# Patient Record
Sex: MALE | Race: ASIAN | NOT HISPANIC OR LATINO | URBAN - METROPOLITAN AREA
[De-identification: names, ages, dates, MRNs, and addresses within clinical notes are randomized per-mention and may not be internally consistent; named-entity substitution may affect disease eponyms.]

---

## 2017-10-23 RX ORDER — VALSARTAN 320 MG/1
320 TABLET ORAL DAILY
Qty: 30 TABLET | Refills: 0 | Status: SHIPPED | OUTPATIENT
Start: 2017-10-23 | End: 2018-10-18

## 2017-11-20 RX ORDER — VALSARTAN 320 MG/1
320 TABLET ORAL DAILY
Qty: 30 TABLET | Refills: 0 | OUTPATIENT
Start: 2017-11-20 | End: 2018-11-15

## 2019-03-22 RX ORDER — OXYCODONE HYDROCHLORIDE AND ACETAMINOPHEN 5; 325 MG/1; MG/1
1 TABLET ORAL EVERY 8 HOURS PRN
Qty: 30 TABLET | Refills: 0 | Status: SHIPPED | OUTPATIENT
Start: 2019-03-22 | End: 2019-03-27

## 2019-03-29 RX ORDER — VALSARTAN 320 MG/1
320 TABLET ORAL DAILY
Qty: 30 TABLET | Refills: 0 | OUTPATIENT
Start: 2019-03-29 | End: 2020-03-23

## 2019-04-11 RX ORDER — ROSUVASTATIN CALCIUM 10 MG/1
10 TABLET, COATED ORAL DAILY
Qty: 90 TABLET | Refills: 2 | Status: SHIPPED | OUTPATIENT
Start: 2019-04-11 | End: 2019-04-12 | Stop reason: ALTCHOICE

## 2019-04-12 DIAGNOSIS — R26.0 ATAXIC GAIT: ICD-10-CM

## 2019-04-12 DIAGNOSIS — R26.2 DIFFICULTY WALKING: ICD-10-CM

## 2019-04-12 DIAGNOSIS — Z86.73 HISTORY OF STROKE: Primary | ICD-10-CM

## 2019-04-12 DIAGNOSIS — R29.6 RECURRENT FALLS: ICD-10-CM

## 2019-04-12 RX ORDER — ATORVASTATIN CALCIUM 20 MG/1
20 TABLET, FILM COATED ORAL DAILY
Qty: 90 TABLET | Refills: 3 | Status: SHIPPED | OUTPATIENT
Start: 2019-04-12 | End: 2020-04-06

## 2019-05-16 ENCOUNTER — OUTPATIENT (OUTPATIENT)
Dept: OUTPATIENT SERVICES | Facility: HOSPITAL | Age: 84
LOS: 1 days | End: 2019-05-16
Payer: MEDICARE

## 2019-05-16 ENCOUNTER — APPOINTMENT (OUTPATIENT)
Dept: ULTRASOUND IMAGING | Facility: IMAGING CENTER | Age: 84
End: 2019-05-16
Payer: MEDICARE

## 2019-05-16 DIAGNOSIS — Z00.8 ENCOUNTER FOR OTHER GENERAL EXAMINATION: ICD-10-CM

## 2019-05-16 DIAGNOSIS — Z98.89 OTHER SPECIFIED POSTPROCEDURAL STATES: Chronic | ICD-10-CM

## 2019-05-16 DIAGNOSIS — Z95.5 PRESENCE OF CORONARY ANGIOPLASTY IMPLANT AND GRAFT: Chronic | ICD-10-CM

## 2019-05-16 PROCEDURE — 76700 US EXAM ABDOM COMPLETE: CPT | Mod: 26

## 2019-05-16 PROCEDURE — 76700 US EXAM ABDOM COMPLETE: CPT

## 2019-05-24 ENCOUNTER — INPATIENT (INPATIENT)
Facility: HOSPITAL | Age: 84
LOS: 4 days | Discharge: HOME HEALTH | End: 2019-05-29
Attending: INTERNAL MEDICINE | Admitting: INTERNAL MEDICINE
Payer: MEDICARE

## 2019-05-24 VITALS
TEMPERATURE: 98 F | SYSTOLIC BLOOD PRESSURE: 123 MMHG | OXYGEN SATURATION: 98 % | RESPIRATION RATE: 16 BRPM | DIASTOLIC BLOOD PRESSURE: 62 MMHG | HEART RATE: 84 BPM

## 2019-05-24 DIAGNOSIS — I21.9 ACUTE MYOCARDIAL INFARCTION, UNSPECIFIED: ICD-10-CM

## 2019-05-24 DIAGNOSIS — E11.9 TYPE 2 DIABETES MELLITUS WITHOUT COMPLICATIONS: ICD-10-CM

## 2019-05-24 DIAGNOSIS — E78.5 HYPERLIPIDEMIA, UNSPECIFIED: ICD-10-CM

## 2019-05-24 DIAGNOSIS — I10 ESSENTIAL (PRIMARY) HYPERTENSION: ICD-10-CM

## 2019-05-24 DIAGNOSIS — Z95.5 PRESENCE OF CORONARY ANGIOPLASTY IMPLANT AND GRAFT: Chronic | ICD-10-CM

## 2019-05-24 DIAGNOSIS — I63.9 CEREBRAL INFARCTION, UNSPECIFIED: ICD-10-CM

## 2019-05-24 DIAGNOSIS — Z98.89 OTHER SPECIFIED POSTPROCEDURAL STATES: Chronic | ICD-10-CM

## 2019-05-24 DIAGNOSIS — Z29.9 ENCOUNTER FOR PROPHYLACTIC MEASURES, UNSPECIFIED: ICD-10-CM

## 2019-05-24 DIAGNOSIS — G62.9 POLYNEUROPATHY, UNSPECIFIED: ICD-10-CM

## 2019-05-24 LAB
ALBUMIN SERPL ELPH-MCNC: 4.1 G/DL — SIGNIFICANT CHANGE UP (ref 3.3–5)
ALP SERPL-CCNC: 70 U/L — SIGNIFICANT CHANGE UP (ref 40–120)
ALT FLD-CCNC: 46 U/L — HIGH (ref 4–41)
ANION GAP SERPL CALC-SCNC: 14 MMO/L — SIGNIFICANT CHANGE UP (ref 7–14)
APTT BLD: 29.2 SEC — SIGNIFICANT CHANGE UP (ref 27.5–36.3)
AST SERPL-CCNC: 54 U/L — HIGH (ref 4–40)
BASE EXCESS BLDA CALC-SCNC: -4.6 MMOL/L — SIGNIFICANT CHANGE UP
BASE EXCESS BLDV CALC-SCNC: -3.8 MMOL/L — SIGNIFICANT CHANGE UP
BILIRUB SERPL-MCNC: 0.5 MG/DL — SIGNIFICANT CHANGE UP (ref 0.2–1.2)
BUN SERPL-MCNC: 32 MG/DL — HIGH (ref 7–23)
CA-I BLDA-SCNC: 1.04 MMOL/L — LOW (ref 1.15–1.29)
CALCIUM SERPL-MCNC: 9.2 MG/DL — SIGNIFICANT CHANGE UP (ref 8.4–10.5)
CHLORIDE SERPL-SCNC: 93 MMOL/L — LOW (ref 98–107)
CK MB BLD-MCNC: 2.9 — HIGH (ref 0–2.5)
CK MB BLD-MCNC: 4.44 NG/ML — SIGNIFICANT CHANGE UP (ref 1–6.6)
CK SERPL-CCNC: 155 U/L — SIGNIFICANT CHANGE UP (ref 30–200)
CO2 SERPL-SCNC: 20 MMOL/L — LOW (ref 22–31)
CREAT SERPL-MCNC: 1.82 MG/DL — HIGH (ref 0.5–1.3)
GLUCOSE BLDA-MCNC: 420 MG/DL — HIGH (ref 70–99)
GLUCOSE BLDC GLUCOMTR-MCNC: 286 MG/DL — HIGH (ref 70–99)
GLUCOSE BLDC GLUCOMTR-MCNC: 352 MG/DL — HIGH (ref 70–99)
GLUCOSE BLDC GLUCOMTR-MCNC: 381 MG/DL — HIGH (ref 70–99)
GLUCOSE BLDC GLUCOMTR-MCNC: 409 MG/DL — HIGH (ref 70–99)
GLUCOSE SERPL-MCNC: 478 MG/DL — CRITICAL HIGH (ref 70–99)
HCO3 BLDA-SCNC: 21 MMOL/L — LOW (ref 22–26)
HCO3 BLDV-SCNC: 20 MMOL/L — SIGNIFICANT CHANGE UP (ref 20–27)
HCT VFR BLD CALC: 35.2 % — LOW (ref 39–50)
HCT VFR BLDA CALC: 34.8 % — LOW (ref 39–51)
HGB BLD-MCNC: 11.6 G/DL — LOW (ref 13–17)
HGB BLDA-MCNC: 11.3 G/DL — LOW (ref 13–17)
INR BLD: 1.14 — SIGNIFICANT CHANGE UP (ref 0.88–1.17)
LACTATE BLDA-SCNC: 2.1 MMOL/L — HIGH (ref 0.5–2)
MCHC RBC-ENTMCNC: 29.5 PG — SIGNIFICANT CHANGE UP (ref 27–34)
MCHC RBC-ENTMCNC: 33 % — SIGNIFICANT CHANGE UP (ref 32–36)
MCV RBC AUTO: 89.6 FL — SIGNIFICANT CHANGE UP (ref 80–100)
NRBC # FLD: 0 K/UL — SIGNIFICANT CHANGE UP (ref 0–0)
NT-PROBNP SERPL-SCNC: 9001 PG/ML — SIGNIFICANT CHANGE UP
PCO2 BLDA: 40 MMHG — SIGNIFICANT CHANGE UP (ref 35–48)
PCO2 BLDV: 43 MMHG — SIGNIFICANT CHANGE UP (ref 41–51)
PH BLDA: 7.33 PH — LOW (ref 7.35–7.45)
PH BLDV: 7.32 PH — SIGNIFICANT CHANGE UP (ref 7.32–7.43)
PLATELET # BLD AUTO: 249 K/UL — SIGNIFICANT CHANGE UP (ref 150–400)
PMV BLD: 10.2 FL — SIGNIFICANT CHANGE UP (ref 7–13)
PO2 BLDA: 133 MMHG — HIGH (ref 83–108)
PO2 BLDV: 29 MMHG — LOW (ref 35–40)
POTASSIUM BLDA-SCNC: 5.8 MMOL/L — HIGH (ref 3.4–4.5)
POTASSIUM SERPL-MCNC: 6.5 MMOL/L — CRITICAL HIGH (ref 3.5–5.3)
POTASSIUM SERPL-SCNC: 6.5 MMOL/L — CRITICAL HIGH (ref 3.5–5.3)
PROT SERPL-MCNC: 7.9 G/DL — SIGNIFICANT CHANGE UP (ref 6–8.3)
PROTHROM AB SERPL-ACNC: 13.1 SEC — SIGNIFICANT CHANGE UP (ref 9.8–13.1)
RBC # BLD: 3.93 M/UL — LOW (ref 4.2–5.8)
RBC # FLD: 13.5 % — SIGNIFICANT CHANGE UP (ref 10.3–14.5)
SAO2 % BLDA: 98.3 % — SIGNIFICANT CHANGE UP (ref 95–99)
SAO2 % BLDV: 41 % — LOW (ref 60–85)
SODIUM BLDA-SCNC: 128 MMOL/L — LOW (ref 136–146)
SODIUM SERPL-SCNC: 127 MMOL/L — LOW (ref 135–145)
TROPONIN T, HIGH SENSITIVITY: 601 NG/L — CRITICAL HIGH (ref ?–14)
WBC # BLD: 10.37 K/UL — SIGNIFICANT CHANGE UP (ref 3.8–10.5)
WBC # FLD AUTO: 10.37 K/UL — SIGNIFICANT CHANGE UP (ref 3.8–10.5)

## 2019-05-24 PROCEDURE — 93459 L HRT ART/GRFT ANGIO: CPT | Mod: 26,59

## 2019-05-24 PROCEDURE — 93308 TTE F-UP OR LMTD: CPT | Mod: 26,GC

## 2019-05-24 PROCEDURE — 93321 DOPPLER ECHO F-UP/LMTD STD: CPT | Mod: 26

## 2019-05-24 PROCEDURE — 93325 DOPPLER ECHO COLOR FLOW MAPG: CPT | Mod: 26,GC

## 2019-05-24 PROCEDURE — 33967 INSERT I-AORT PERCUT DEVICE: CPT

## 2019-05-24 PROCEDURE — 92937 PRQ TRLUML REVSC CAB GRF 1: CPT | Mod: LC

## 2019-05-24 PROCEDURE — 71045 X-RAY EXAM CHEST 1 VIEW: CPT | Mod: 26,76

## 2019-05-24 RX ORDER — CARVEDILOL PHOSPHATE 80 MG/1
6.25 CAPSULE, EXTENDED RELEASE ORAL EVERY 12 HOURS
Refills: 0 | Status: DISCONTINUED | OUTPATIENT
Start: 2019-05-24 | End: 2019-05-29

## 2019-05-24 RX ORDER — DEXTROSE 50 % IN WATER 50 %
15 SYRINGE (ML) INTRAVENOUS ONCE
Refills: 0 | Status: DISCONTINUED | OUTPATIENT
Start: 2019-05-24 | End: 2019-05-25

## 2019-05-24 RX ORDER — BENZOCAINE AND MENTHOL 5; 1 G/100ML; G/100ML
1 LIQUID ORAL ONCE
Refills: 0 | Status: COMPLETED | OUTPATIENT
Start: 2019-05-24 | End: 2019-05-24

## 2019-05-24 RX ORDER — DEXTROSE 50 % IN WATER 50 %
25 SYRINGE (ML) INTRAVENOUS ONCE
Refills: 0 | Status: DISCONTINUED | OUTPATIENT
Start: 2019-05-24 | End: 2019-05-25

## 2019-05-24 RX ORDER — GLUCAGON INJECTION, SOLUTION 0.5 MG/.1ML
1 INJECTION, SOLUTION SUBCUTANEOUS ONCE
Refills: 0 | Status: DISCONTINUED | OUTPATIENT
Start: 2019-05-24 | End: 2019-05-29

## 2019-05-24 RX ORDER — ASPIRIN/CALCIUM CARB/MAGNESIUM 324 MG
324 TABLET ORAL ONCE
Refills: 0 | Status: COMPLETED | OUTPATIENT
Start: 2019-05-24 | End: 2019-05-24

## 2019-05-24 RX ORDER — ASPIRIN/CALCIUM CARB/MAGNESIUM 324 MG
81 TABLET ORAL DAILY
Refills: 0 | Status: DISCONTINUED | OUTPATIENT
Start: 2019-05-25 | End: 2019-05-29

## 2019-05-24 RX ORDER — INSULIN LISPRO 100/ML
VIAL (ML) SUBCUTANEOUS AT BEDTIME
Refills: 0 | Status: DISCONTINUED | OUTPATIENT
Start: 2019-05-24 | End: 2019-05-29

## 2019-05-24 RX ORDER — HEPARIN SODIUM 5000 [USP'U]/ML
1000 INJECTION INTRAVENOUS; SUBCUTANEOUS
Qty: 25000 | Refills: 0 | Status: DISCONTINUED | OUTPATIENT
Start: 2019-05-25 | End: 2019-05-26

## 2019-05-24 RX ORDER — HEPARIN SODIUM 5000 [USP'U]/ML
5000 INJECTION INTRAVENOUS; SUBCUTANEOUS ONCE
Refills: 0 | Status: COMPLETED | OUTPATIENT
Start: 2019-05-24 | End: 2019-05-24

## 2019-05-24 RX ORDER — CLOPIDOGREL BISULFATE 75 MG/1
600 TABLET, FILM COATED ORAL ONCE
Refills: 0 | Status: COMPLETED | OUTPATIENT
Start: 2019-05-24 | End: 2019-05-24

## 2019-05-24 RX ORDER — INSULIN LISPRO 100/ML
VIAL (ML) SUBCUTANEOUS
Refills: 0 | Status: DISCONTINUED | OUTPATIENT
Start: 2019-05-24 | End: 2019-05-29

## 2019-05-24 RX ORDER — HEPARIN SODIUM 5000 [USP'U]/ML
1000 INJECTION INTRAVENOUS; SUBCUTANEOUS
Qty: 25000 | Refills: 0 | Status: DISCONTINUED | OUTPATIENT
Start: 2019-05-24 | End: 2019-05-24

## 2019-05-24 RX ORDER — INSULIN GLARGINE 100 [IU]/ML
20 INJECTION, SOLUTION SUBCUTANEOUS AT BEDTIME
Refills: 0 | Status: DISCONTINUED | OUTPATIENT
Start: 2019-05-24 | End: 2019-05-29

## 2019-05-24 RX ORDER — DEXTROSE 50 % IN WATER 50 %
12.5 SYRINGE (ML) INTRAVENOUS ONCE
Refills: 0 | Status: DISCONTINUED | OUTPATIENT
Start: 2019-05-24 | End: 2019-05-25

## 2019-05-24 RX ORDER — CHLORHEXIDINE GLUCONATE 213 G/1000ML
1 SOLUTION TOPICAL
Refills: 0 | Status: DISCONTINUED | OUTPATIENT
Start: 2019-05-24 | End: 2019-05-29

## 2019-05-24 RX ORDER — ATORVASTATIN CALCIUM 80 MG/1
80 TABLET, FILM COATED ORAL AT BEDTIME
Refills: 0 | Status: DISCONTINUED | OUTPATIENT
Start: 2019-05-24 | End: 2019-05-29

## 2019-05-24 RX ORDER — CLOPIDOGREL BISULFATE 75 MG/1
75 TABLET, FILM COATED ORAL DAILY
Refills: 0 | Status: DISCONTINUED | OUTPATIENT
Start: 2019-05-25 | End: 2019-05-29

## 2019-05-24 RX ORDER — GABAPENTIN 400 MG/1
400 CAPSULE ORAL DAILY
Refills: 0 | Status: DISCONTINUED | OUTPATIENT
Start: 2019-05-24 | End: 2019-05-29

## 2019-05-24 RX ORDER — ASPIRIN/CALCIUM CARB/MAGNESIUM 324 MG
325 TABLET ORAL ONCE
Refills: 0 | Status: DISCONTINUED | OUTPATIENT
Start: 2019-05-24 | End: 2019-05-24

## 2019-05-24 RX ORDER — SODIUM CHLORIDE 9 MG/ML
1000 INJECTION, SOLUTION INTRAVENOUS
Refills: 0 | Status: DISCONTINUED | OUTPATIENT
Start: 2019-05-24 | End: 2019-05-25

## 2019-05-24 RX ADMIN — Medication 324 MILLIGRAM(S): at 13:00

## 2019-05-24 RX ADMIN — CARVEDILOL PHOSPHATE 6.25 MILLIGRAM(S): 80 CAPSULE, EXTENDED RELEASE ORAL at 21:51

## 2019-05-24 RX ADMIN — Medication 200 MILLIGRAM(S): at 23:26

## 2019-05-24 RX ADMIN — Medication 2: at 18:27

## 2019-05-24 RX ADMIN — ATORVASTATIN CALCIUM 80 MILLIGRAM(S): 80 TABLET, FILM COATED ORAL at 21:51

## 2019-05-24 RX ADMIN — INSULIN GLARGINE 20 UNIT(S): 100 INJECTION, SOLUTION SUBCUTANEOUS at 21:51

## 2019-05-24 RX ADMIN — CLOPIDOGREL BISULFATE 600 MILLIGRAM(S): 75 TABLET, FILM COATED ORAL at 13:05

## 2019-05-24 RX ADMIN — BENZOCAINE AND MENTHOL 1 LOZENGE: 5; 1 LIQUID ORAL at 20:30

## 2019-05-24 RX ADMIN — Medication 1: at 21:56

## 2019-05-24 RX ADMIN — HEPARIN SODIUM 5000 UNIT(S): 5000 INJECTION INTRAVENOUS; SUBCUTANEOUS at 13:05

## 2019-05-24 RX ADMIN — Medication 100 MILLIGRAM(S): at 21:51

## 2019-05-24 NOTE — H&P ADULT - NSHPSOCIALHISTORY_GEN_ALL_CORE
T: denies, lifelong  EtOH: denies, lifelong  Recreational drugs: denies  Herbal supplements: says he takes vitamins and supplements from [place] (vit c, b complex, etc)

## 2019-05-24 NOTE — CHART NOTE - NSCHARTNOTEFT_GEN_A_CORE
Left femoral sheath removed; 22 minutes of manual pressure applied; patient tolerated well; no hematoma noted; distal pulses 2+  no medications administered  clean dry dressing applied

## 2019-05-24 NOTE — ED PROVIDER NOTE - CLINICAL SUMMARY MEDICAL DECISION MAKING FREE TEXT BOX
84 y/o male with PMHx of CAD s/p stent, DM, HLD, HTN, and CVA presenting to the ED for syncope today. Abnormal EKG with concern for AMI. Cardiology consulted. Plavix and Heparin ordered.

## 2019-05-24 NOTE — H&P ADULT - HISTORY OF PRESENT ILLNESS
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 Pt reports he passed out yesterday and then again today after getting his ears cleaned out. Per son at bedside, patient never lost consciousness, but rather his "legs gave out beneath him and he crumbled." Pt admits to intermittent chest pressure (6/10, gradual onset, intermittent) and SOB, worsening over the past 3 days. Says he used to be very active and walk a lot, but recently he has only been able to walk around the house and becomes SOB after walking 2-3 blocks. Pt also admits to nausea with an episode of vomiting yesterday. Pt denies any headache, neck pain, back pain, fever, chills, or abd pain.     In the ED, afebrile, HR 80s, BP 120s/60, satting well on room air.  Elevated cardiac enzymes and abnormal EKG concerning for MI. Pt placed on Heparin and taken urgently to cath lab. LIMA to LAD was patent, but saphenous vein graft to OM1 found to have 90% stenosis, s/p 1 GEMMA.

## 2019-05-24 NOTE — ED PROVIDER NOTE - PROGRESS NOTE DETAILS
Dr. Morris: Cardiology was paged and sent EKG. Cardiology repaged for bradycardia, repeat ekg ordered. Will admit to CCU given pt remains symptomatic, concern for possible ACS pt given heparin bolus.

## 2019-05-24 NOTE — H&P ADULT - PROBLEM SELECTOR PLAN 1
Presented with progressive intermittent chest pressure and KING, found to have elevated trops and signs of inferior wall ischemia on EKG in ED, sent to cath lab. Found to have 90% stenosis of saphenous vein graft to OM1, s/p 1 stent. Pt returned to CCU with IABP and TVP.   - trend trops till peak  - c/w ASA 81  - c/w Plavix  - c/w statin  - c/w home antihypertensives  - trend FSs and adjust insulin as necessary  - diet and lifestyle modification education  - f/u echo  - wean IABP as tolerated  - monitor on tele, wean from TVP as tolerated   - monitor b/l groin for hematomas

## 2019-05-24 NOTE — ED ADULT NURSE NOTE - NSIMPLEMENTINTERV_GEN_ALL_ED
Implemented All Fall with Harm Risk Interventions:  [place] to call system. Call bell, personal items and telephone within reach. Instruct patient to call for assistance. Room bathroom lighting operational. Non-slip footwear when patient is off stretcher. Physically safe environment: no spills, clutter or unnecessary equipment. Stretcher in lowest position, wheels locked, appropriate side rails in place. Provide visual cue, wrist band, yellow gown, etc. Monitor gait and stability. Monitor for mental status changes and reorient to person, place, and time. Review medications for side effects contributing to fall risk. Reinforce activity limits and safety measures with patient and family. Provide visual clues: red socks.

## 2019-05-24 NOTE — H&P ADULT - NSICDXPASTMEDICALHX_GEN_ALL_CORE_FT
PAST MEDICAL HISTORY:  Coronary Artery Disease     Diabetes Mellitus Type II     Hyperlipemia     Hypertension     Myocardial infarction     Neuropathy     Stented Coronary Artery     Stroke mild left facial numbness   no other residuals verbalized

## 2019-05-24 NOTE — H&P ADULT - NSHPREVIEWOFSYSTEMS_GEN_ALL_CORE
REVIEW OF SYSTEMS (post-cath):    CONSTITUTIONAL: No weakness, fevers or chills  EYES/ENT: No visual changes;  No vertigo or throat pain   NECK: No pain or stiffness  RESPIRATORY: No wheezing, hemoptysis; No shortness of breath. Endorses cough, says this is residual from URI he's had for past week  CARDIOVASCULAR: No chest pain or palpitations  GASTROINTESTINAL: No abdominal or epigastric pain. No nausea, vomiting, or hematemesis; No diarrhea or constipation. No melena or hematochezia.  GENITOURINARY: No dysuria, frequency or hematuria  NEUROLOGICAL: No numbness or weakness  SKIN: No itching, rashes

## 2019-05-24 NOTE — ED ADULT NURSE NOTE - CHIEF COMPLAINT QUOTE
family states " he is passing out since yesterday  with nausea and elevated blood sugar ". h/o MI and had wax removed from ears to day. FS on the field 464 h/o Afib on ASA only left hand 20 by EMS

## 2019-05-24 NOTE — H&P ADULT - PROBLEM SELECTOR PLAN 2
Essential HTN  - c/w home metoprolol  - hold home losartan for now. trend sCr daily, monitor for signs of BERENICE prior to restarting ARB  - c/w home carvedilol  - monitor vitals

## 2019-05-24 NOTE — ED PROVIDER NOTE - PSH
H/O coronary angiogram    History of Cataract Extraction    Hx of CABG    S/P coronary artery stent placement  1/6/09

## 2019-05-24 NOTE — H&P ADULT - NSHPPHYSICALEXAM_GEN_ALL_CORE
PHYSICAL EXAM:  Vital Signs Last 24 Hrs  T(C): 36.3 (24 May 2019 16:18), Max: 36.7 (24 May 2019 12:20)  T(F): 97.3 (24 May 2019 16:18), Max: 98 (24 May 2019 12:20)  HR: 60 (24 May 2019 18:00) (60 - 87)  BP: 157/65 (24 May 2019 18:00) (123/62 - 157/65)  BP(mean): 89 (24 May 2019 18:00) (89 - 113)  RR: 14 (24 May 2019 18:00) (14 - 18)  SpO2: 100% (24 May 2019 18:00) (98% - 100%)  GENERAL: NAD. Appears comfortable  HEENT: Normocephalic, atraumatic. EOMI. PERRL. Normal oral mucosa.   RESP: Clear to auscultation bilaterally anteriorly and laterally. +cough  CVS: Regular rate and rhythm, no murmurs  ABD: Soft, nondistended, nontender  EXT: VELARDE x 4   SKIN: Warm, dry, no rashes

## 2019-05-24 NOTE — H&P ADULT - ASSESSMENT
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 Found to have elevated cardiac enzymes and EKG w/ ST elevations, urgently cathed. Found to have 90% stenosis of saphenous vein graft to OM1, s/p 1 stent.

## 2019-05-24 NOTE — H&P ADULT - PROBLEM SELECTOR PLAN 4
- on home Lantus 20   - hold home oral antiglycemic agents  - ISS  - adjust insulin as necessary  - trend FSs  - f/u HgbA1C

## 2019-05-24 NOTE — ED PROVIDER NOTE - OBJECTIVE STATEMENT
84 y/o male with PMHx of CAD s/p stent, DM, HLD, HTN, and CVA presenting to the ED for syncope today. Pt reports he passed out yesterday and then again today after getting his ears cleaned out. Pt admits to intermittent chest pressure and SOB over the past 3 days. Pt also admits to nausea with an episode of vomiting yesterday. Pt denies any headache, neck pain, back pain, fever, chills, or abd pain.

## 2019-05-24 NOTE — H&P ADULT - NSHPLABSRESULTS_GEN_ALL_CORE
11.6   10.37 )-----------( 249      ( 24 May 2019 12:59 )             35.2     05-24    127<L>  |  93<L>  |  32<H>  ----------------------------<  478<HH>  6.5<HH>   |  20<L>  |  1.82<H>    Ca    9.2      24 May 2019 12:55    TPro  7.9  /  Alb  4.1  /  TBili  0.5  /  DBili  x   /  AST  54<H>  /  ALT  46<H>  /  AlkPhos  70  05-24    PT/INR - ( 24 May 2019 12:55 )   PT: 13.1 SEC;   INR: 1.14          PTT - ( 24 May 2019 12:55 )  PTT:29.2 SEC      Cath Report:    PROCEDURE:  --  Intra-aortic balloon counterpulsation.  --  Temporary pacemaker placement.  --  Left heart catheterization.  --  Saphenous vein graft angiography.  --  LIMA graft angiography.  --  Intervention on SVG (proximal 1/3) from the aorta to OM1: balloon  angioplasty, stent.      VENTRICLES: No LV gram was performed; however, a recent (in cath lab)  echocardiogram demonstrated an EF of 40 %.  CORONARY VESSELS: The coronary circulation is right dominant.  LM:   --  LM: This vessel was not injected.  RCA:   --  RCA: This vessel was not injected.  GRAFTS:   --  Graft to the mid LAD: The graft was a LIMA. It was normal.  --  Graft to the 1st obtuse marginal: The graft was a saphenous vein graft  from the aorta. There was a discrete 90 % stenosis in the proximal third  of the graft, at the site of a prior stent, within the stented segment.  There was ROQUE grade 3 flow through the graft (brisk flow).  COMPLICATIONS: There were no complications.    SUMMARY:  1ST LESION INTERVENTIONS: A successful balloon angioplasty with stent was  performed on the 90 % lesion in the proximal third of the saphenous vein  graft from the aorta to the 1st obtuse marginal. Following intervention  there was a 1 % residual stenosis.  DIAGNOSTIC RECOMMENDATIONS: PCI of proximal SVG to OM for treatment of  unstable angina. Patient presented to cath lab in junctional rhythm (40's  bpm escape rhythm) and with low blood pressure (100's mmHg systolic). IABP  and temporary wire placed prior to PCI.  INTERVENTIONAL RECOMMENDATIONS: Add clopidogrel (Plavix), 75 mg, PO, daily.  Add aspirin, 325 mg, PO, daily. Patient management should include close  monitoring of BUN and creatinine. Medical management is recommended.  Continue IABP for next 24 hours. At the end of the procedure patient was  in sinus rhythm with rates in the 70-80's bpm. Plan to remove temporary  pacing wire if patient continues to maintain sinus rhythm.

## 2019-05-24 NOTE — ED PROVIDER NOTE - ATTENDING CONTRIBUTION TO CARE
Pt was seen and evaluated by me. Pt is a 86 y/o male with PMHx of CAD s/p stent, DM, HLD, HTN, and CVA presenting to the ED for syncope today. Pt reports he passed out yesterday and then again today after getting his ears cleaned out. Pt admits to intermittent chest pressure and SOB over the past 3 days. Pt also admits to nausea with an episode of vomiting yesterday. Pt denies any headache, neck pain, back pain, fever, chills, or abd pain. Lungs CTA b/l. RRR. Abd soft, non-tender. Concern for AMI with abnormal EKG.

## 2019-05-24 NOTE — ED ADULT TRIAGE NOTE - CHIEF COMPLAINT QUOTE
family states " he is passing out since yesterday  with nausea and elevated blood sugar ". h/o MI and had wax removed from ears to day. FS on the field 464 h/o Afib on ASA only left hand 20 by EMS family states " he is passing out since yesterday  with nausea and elevated blood sugar ". h/o MI and had wax removed from ears to day. FS on the field 464 h/o Afib on ASA only left hand 20 by EMS

## 2019-05-24 NOTE — ED PROVIDER NOTE - PMH
Coronary Artery Disease    Diabetes Mellitus Type II    Hyperlipemia    Hypertension    Myocardial infarction    Neuropathy    Stented Coronary Artery    Stroke  mild left facial numbness   no other residuals verbalized

## 2019-05-24 NOTE — H&P ADULT - NSICDXPASTSURGICALHX_GEN_ALL_CORE_FT
PAST SURGICAL HISTORY:  H/O coronary angiogram     History of Cataract Extraction     Hx of CABG     S/P coronary artery stent placement 1/6/09

## 2019-05-25 DIAGNOSIS — I21.3 ST ELEVATION (STEMI) MYOCARDIAL INFARCTION OF UNSPECIFIED SITE: ICD-10-CM

## 2019-05-25 LAB
ALBUMIN SERPL ELPH-MCNC: 3.3 G/DL — SIGNIFICANT CHANGE UP (ref 3.3–5)
ALP SERPL-CCNC: 62 U/L — SIGNIFICANT CHANGE UP (ref 40–120)
ALT FLD-CCNC: 35 U/L — SIGNIFICANT CHANGE UP (ref 4–41)
ANION GAP SERPL CALC-SCNC: 12 MMO/L — SIGNIFICANT CHANGE UP (ref 7–14)
APTT BLD: 73 SEC — HIGH (ref 27.5–36.3)
APTT BLD: 90.1 SEC — HIGH (ref 27.5–36.3)
AST SERPL-CCNC: 33 U/L — SIGNIFICANT CHANGE UP (ref 4–40)
BILIRUB SERPL-MCNC: 0.4 MG/DL — SIGNIFICANT CHANGE UP (ref 0.2–1.2)
BUN SERPL-MCNC: 38 MG/DL — HIGH (ref 7–23)
CALCIUM SERPL-MCNC: 8.6 MG/DL — SIGNIFICANT CHANGE UP (ref 8.4–10.5)
CHLORIDE SERPL-SCNC: 98 MMOL/L — SIGNIFICANT CHANGE UP (ref 98–107)
CHOLEST SERPL-MCNC: 89 MG/DL — LOW (ref 120–199)
CK MB BLD-MCNC: 2.7 — HIGH (ref 0–2.5)
CK MB BLD-MCNC: 2.9 — HIGH (ref 0–2.5)
CK MB BLD-MCNC: 4.96 NG/ML — SIGNIFICANT CHANGE UP (ref 1–6.6)
CK MB BLD-MCNC: 5.1 NG/ML — SIGNIFICANT CHANGE UP (ref 1–6.6)
CK SERPL-CCNC: 169 U/L — SIGNIFICANT CHANGE UP (ref 30–200)
CK SERPL-CCNC: 192 U/L — SIGNIFICANT CHANGE UP (ref 30–200)
CO2 SERPL-SCNC: 20 MMOL/L — LOW (ref 22–31)
CREAT SERPL-MCNC: 1.88 MG/DL — HIGH (ref 0.5–1.3)
GLUCOSE BLDC GLUCOMTR-MCNC: 108 MG/DL — HIGH (ref 70–99)
GLUCOSE BLDC GLUCOMTR-MCNC: 108 MG/DL — HIGH (ref 70–99)
GLUCOSE BLDC GLUCOMTR-MCNC: 189 MG/DL — HIGH (ref 70–99)
GLUCOSE BLDC GLUCOMTR-MCNC: 209 MG/DL — HIGH (ref 70–99)
GLUCOSE SERPL-MCNC: 183 MG/DL — HIGH (ref 70–99)
HBA1C BLD-MCNC: 9.4 % — HIGH (ref 4–5.6)
HCT VFR BLD CALC: 30.8 % — LOW (ref 39–50)
HDLC SERPL-MCNC: 32 MG/DL — LOW (ref 35–55)
HGB BLD-MCNC: 10.4 G/DL — LOW (ref 13–17)
LIPID PNL WITH DIRECT LDL SERPL: 52 MG/DL — SIGNIFICANT CHANGE UP
MAGNESIUM SERPL-MCNC: 2.2 MG/DL — SIGNIFICANT CHANGE UP (ref 1.6–2.6)
MCHC RBC-ENTMCNC: 29.7 PG — SIGNIFICANT CHANGE UP (ref 27–34)
MCHC RBC-ENTMCNC: 33.8 % — SIGNIFICANT CHANGE UP (ref 32–36)
MCV RBC AUTO: 88 FL — SIGNIFICANT CHANGE UP (ref 80–100)
NRBC # FLD: 0 K/UL — SIGNIFICANT CHANGE UP (ref 0–0)
PHOSPHATE SERPL-MCNC: 3.3 MG/DL — SIGNIFICANT CHANGE UP (ref 2.5–4.5)
PLATELET # BLD AUTO: 211 K/UL — SIGNIFICANT CHANGE UP (ref 150–400)
PMV BLD: 10.4 FL — SIGNIFICANT CHANGE UP (ref 7–13)
POTASSIUM SERPL-MCNC: 4.9 MMOL/L — SIGNIFICANT CHANGE UP (ref 3.5–5.3)
POTASSIUM SERPL-SCNC: 4.9 MMOL/L — SIGNIFICANT CHANGE UP (ref 3.5–5.3)
PROT SERPL-MCNC: 6.6 G/DL — SIGNIFICANT CHANGE UP (ref 6–8.3)
RBC # BLD: 3.5 M/UL — LOW (ref 4.2–5.8)
RBC # FLD: 13.7 % — SIGNIFICANT CHANGE UP (ref 10.3–14.5)
SODIUM SERPL-SCNC: 130 MMOL/L — LOW (ref 135–145)
T3 SERPL-MCNC: 56.2 NG/DL — LOW (ref 80–200)
T4 AB SER-ACNC: 4.63 UG/DL — LOW (ref 5.1–13)
TRIGL SERPL-MCNC: 49 MG/DL — SIGNIFICANT CHANGE UP (ref 10–149)
TROPONIN T, HIGH SENSITIVITY: 1002 NG/L — CRITICAL HIGH (ref ?–14)
TROPONIN T, HIGH SENSITIVITY: 987 NG/L — CRITICAL HIGH (ref ?–14)
TSH SERPL-MCNC: 1.1 UIU/ML — SIGNIFICANT CHANGE UP (ref 0.27–4.2)
WBC # BLD: 10.98 K/UL — HIGH (ref 3.8–10.5)
WBC # FLD AUTO: 10.98 K/UL — HIGH (ref 3.8–10.5)

## 2019-05-25 PROCEDURE — 93306 TTE W/DOPPLER COMPLETE: CPT | Mod: 26

## 2019-05-25 PROCEDURE — 71045 X-RAY EXAM CHEST 1 VIEW: CPT | Mod: 26

## 2019-05-25 PROCEDURE — 99233 SBSQ HOSP IP/OBS HIGH 50: CPT | Mod: GC

## 2019-05-25 RX ORDER — IPRATROPIUM/ALBUTEROL SULFATE 18-103MCG
3 AEROSOL WITH ADAPTER (GRAM) INHALATION ONCE
Refills: 0 | Status: COMPLETED | OUTPATIENT
Start: 2019-05-25 | End: 2019-05-25

## 2019-05-25 RX ORDER — FUROSEMIDE 40 MG
20 TABLET ORAL ONCE
Refills: 0 | Status: COMPLETED | OUTPATIENT
Start: 2019-05-25 | End: 2019-05-25

## 2019-05-25 RX ORDER — LORATADINE 10 MG/1
10 TABLET ORAL DAILY
Refills: 0 | Status: DISCONTINUED | OUTPATIENT
Start: 2019-05-25 | End: 2019-05-29

## 2019-05-25 RX ORDER — IPRATROPIUM/ALBUTEROL SULFATE 18-103MCG
3 AEROSOL WITH ADAPTER (GRAM) INHALATION EVERY 6 HOURS
Refills: 0 | Status: DISCONTINUED | OUTPATIENT
Start: 2019-05-25 | End: 2019-05-27

## 2019-05-25 RX ORDER — FLUTICASONE PROPIONATE 50 MCG
1 SPRAY, SUSPENSION NASAL
Refills: 0 | Status: DISCONTINUED | OUTPATIENT
Start: 2019-05-25 | End: 2019-05-29

## 2019-05-25 RX ORDER — CODEINE SULFATE 60 MG/1
15 TABLET ORAL EVERY 8 HOURS
Refills: 0 | Status: DISCONTINUED | OUTPATIENT
Start: 2019-05-25 | End: 2019-05-25

## 2019-05-25 RX ORDER — BENZOCAINE AND MENTHOL 5; 1 G/100ML; G/100ML
1 LIQUID ORAL
Refills: 0 | Status: DISCONTINUED | OUTPATIENT
Start: 2019-05-25 | End: 2019-05-29

## 2019-05-25 RX ORDER — FUROSEMIDE 40 MG
40 TABLET ORAL ONCE
Refills: 0 | Status: COMPLETED | OUTPATIENT
Start: 2019-05-25 | End: 2019-05-25

## 2019-05-25 RX ADMIN — Medication 81 MILLIGRAM(S): at 12:12

## 2019-05-25 RX ADMIN — CARVEDILOL PHOSPHATE 6.25 MILLIGRAM(S): 80 CAPSULE, EXTENDED RELEASE ORAL at 05:53

## 2019-05-25 RX ADMIN — Medication 100 MILLIGRAM(S): at 22:23

## 2019-05-25 RX ADMIN — Medication 3 MILLILITER(S): at 11:52

## 2019-05-25 RX ADMIN — Medication 200 MILLIGRAM(S): at 07:41

## 2019-05-25 RX ADMIN — HEPARIN SODIUM 10 UNIT(S)/HR: 5000 INJECTION INTRAVENOUS; SUBCUTANEOUS at 03:01

## 2019-05-25 RX ADMIN — Medication 40 MILLIGRAM(S): at 08:47

## 2019-05-25 RX ADMIN — ATORVASTATIN CALCIUM 80 MILLIGRAM(S): 80 TABLET, FILM COATED ORAL at 22:23

## 2019-05-25 RX ADMIN — HEPARIN SODIUM 10 UNIT(S)/HR: 5000 INJECTION INTRAVENOUS; SUBCUTANEOUS at 07:00

## 2019-05-25 RX ADMIN — Medication 1: at 08:08

## 2019-05-25 RX ADMIN — LORATADINE 10 MILLIGRAM(S): 10 TABLET ORAL at 00:46

## 2019-05-25 RX ADMIN — HEPARIN SODIUM 10 UNIT(S)/HR: 5000 INJECTION INTRAVENOUS; SUBCUTANEOUS at 22:27

## 2019-05-25 RX ADMIN — LORATADINE 10 MILLIGRAM(S): 10 TABLET ORAL at 12:12

## 2019-05-25 RX ADMIN — Medication 100 MILLIGRAM(S): at 08:47

## 2019-05-25 RX ADMIN — INSULIN GLARGINE 20 UNIT(S): 100 INJECTION, SOLUTION SUBCUTANEOUS at 22:25

## 2019-05-25 RX ADMIN — Medication 3 MILLILITER(S): at 00:43

## 2019-05-25 RX ADMIN — Medication 200 MILLIGRAM(S): at 22:23

## 2019-05-25 RX ADMIN — BENZOCAINE AND MENTHOL 1 LOZENGE: 5; 1 LIQUID ORAL at 22:23

## 2019-05-25 RX ADMIN — CARVEDILOL PHOSPHATE 6.25 MILLIGRAM(S): 80 CAPSULE, EXTENDED RELEASE ORAL at 18:07

## 2019-05-25 RX ADMIN — Medication 20 MILLIGRAM(S): at 07:56

## 2019-05-25 RX ADMIN — GABAPENTIN 400 MILLIGRAM(S): 400 CAPSULE ORAL at 12:12

## 2019-05-25 RX ADMIN — CLOPIDOGREL BISULFATE 75 MILLIGRAM(S): 75 TABLET, FILM COATED ORAL at 12:12

## 2019-05-25 RX ADMIN — Medication 2: at 12:13

## 2019-05-25 NOTE — PROGRESS NOTE ADULT - SUBJECTIVE AND OBJECTIVE BOX
Date of Admission:  5/24/19  24 hour events:  patient coughing all night  Vital Signs Last 24 Hrs  T(C): 36.4 (25 May 2019 08:00), Max: 36.9 (25 May 2019 00:00)  T(F): 97.6 (25 May 2019 08:00), Max: 98.4 (25 May 2019 00:00)  HR: 69 (25 May 2019 11:00) (60 - 87)  BP: 158/89 (25 May 2019 10:00) (123/62 - 158/89)  BP(mean): 106 (25 May 2019 10:00) (77 - 113)  RR: 15 (25 May 2019 11:00) (12 - 31)  SpO2: 98% (25 May 2019 11:00) (94% - 100%)  I&O's Summary    24 May 2019 07:01  -  25 May 2019 07:00  --------------------------------------------------------  IN: 40 mL / OUT: 400 mL / NET: -360 mL    25 May 2019 07:01  -  25 May 2019 12:05  --------------------------------------------------------  IN: 40 mL / OUT: 775 mL / NET: -735 mL      MEDICATIONS:  aspirin enteric coated 81 milliGRAM(s) Oral daily  carvedilol 6.25 milliGRAM(s) Oral every 12 hours  clopidogrel Tablet 75 milliGRAM(s) Oral daily  heparin  Infusion 1000 Unit(s)/Hr IV Continuous <Continuous>  ALBUTerol/ipratropium for Nebulization 3 milliLiter(s) Nebulizer every 6 hours PRN  benzonatate 100 milliGRAM(s) Oral every 8 hours PRN  guaiFENesin    Syrup 200 milliGRAM(s) Oral every 6 hours PRN  HYDROcodone/homatropine Syrup 5 milliLiter(s) Oral every 6 hours PRN  loratadine 10 milliGRAM(s) Oral daily  gabapentin 400 milliGRAM(s) Oral daily  atorvastatin 80 milliGRAM(s) Oral at bedtime  dextrose 40% Gel 15 Gram(s) Oral once PRN  dextrose 50% Injectable 12.5 Gram(s) IV Push once  dextrose 50% Injectable 25 Gram(s) IV Push once  dextrose 50% Injectable 25 Gram(s) IV Push once  glucagon  Injectable 1 milliGRAM(s) IntraMuscular once PRN  insulin glargine Injectable (LANTUS) 20 Unit(s) SubCutaneous at bedtime  insulin lispro (HumaLOG) corrective regimen sliding scale   SubCutaneous three times a day before meals  insulin lispro (HumaLOG) corrective regimen sliding scale   SubCutaneous at bedtime    benzocaine 15 mG/menthol 3.6 mG Lozenge 1 Lozenge Oral every 3 hours PRN  chlorhexidine 4% Liquid 1 Application(s) Topical <User Schedule>  dextrose 5%. 1000 milliLiter(s) IV Continuous <Continuous>    REVIEW OF SYSTEMS:    CONSTITUTIONAL: complains of weakness  EYES/ENT: No visual changes;  No vertigo or throat pain   NECK: No pain or stiffness  RESPIRATORY: actively coughing, sob with exertion  CARDIOVASCULAR: endorses chest pain to touch  GASTROINTESTINAL: No abdominal or epigastric pain. No nausea, vomiting, or hematemesis; No diarrhea or constipation. No melena or hematochezia.  GENITOURINARY: No dysuria, frequency or hematuria  NEUROLOGICAL: No numbness or weakness  SKIN: No itching, rashes    PHYSICAL EXAM:    GENERAL: NAD, well-developed  HEAD:  Atraumatic, Normocephalic  EYES: EOMI, PERRLA, conjunctiva and sclera clear  NECK: Supple, No JVD  CHEST/LUNG: crackles at the bases  HEART: balloon pump in place Regular rate and rhythm; S1, S2; No murmurs, rubs, or gallops  ABDOMEN: Soft, Nontender, Nondistended; Normoactive bowel sounds  EXTREMITIES:  2+ Peripheral Pulses, No clubbing, cyanosis, or edema  PSYCH: A&Ox3  NEUROLOGY: non-focal  SKIN: No rashes or lesions  LABS:	 	    CBC Full  -  ( 25 May 2019 06:15 )  WBC Count : 10.98 K/uL  Hemoglobin : 10.4 g/dL  Hematocrit : 30.8 %  Platelet Count - Automated : 211 K/uL  Mean Cell Volume : 88.0 fL  Mean Cell Hemoglobin : 29.7 pg  Mean Cell Hemoglobin Concentration : 33.8 %  Auto Neutrophil # : x  Auto Lymphocyte # : x  Auto Monocyte # : x  Auto Eosinophil # : x  Auto Basophil # : x  Auto Neutrophil % : x  Auto Lymphocyte % : x  Auto Monocyte % : x  Auto Eosinophil % : x  Auto Basophil % : x    05-25    130<L>  |  98  |  38<H>  ----------------------------<  183<H>  4.9   |  20<L>  |  1.88<H>  05-24    127<L>  |  93<L>  |  32<H>  ----------------------------<  478<HH>  6.5<HH>   |  20<L>  |  1.82<H>    Ca    8.6      25 May 2019 06:15  Ca    9.2      24 May 2019 12:55  Phos  3.3     05-25  Mg     2.2     05-25    TPro  6.6  /  Alb  3.3  /  TBili  0.4  /  DBili  x   /  AST  33  /  ALT  35  /  AlkPhos  62  05-25  TPro  7.9  /  Alb  4.1  /  TBili  0.5  /  DBili  x   /  AST  54<H>  /  ALT  46<H>  /  AlkPhos  70  05-24      proBNP: Serum Pro-Brain Natriuretic Peptide: 9001 pg/mL (05-24 @ 12:55)          TELEMETRY: 	    ECG:  	  RADIOLOGY:  ECHO:  OTHER: 	    PREVIOUS DIAGNOSTIC TESTING:    [ ] Echocardiogram:  [ ]  Catheterization:  [ ] Stress Test:

## 2019-05-25 NOTE — PROGRESS NOTE ADULT - PROBLEM SELECTOR PLAN 1
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 Pt returned to CCU with IABP and TVP.   - continue with DAPT; aspirin and plavix, and high intensity statin lipitor 80  -balloon pump in place, will continue 1:1 augmentation today, and d/c balloon tomorrow  -continue on heparin gtt while with the balloon, d/c heparin at midnight in preparation for balloon pump removal tomorrow.  -WILL REMOVE TVP today  -a total of lasix 60mg IVP given for SOB

## 2019-05-25 NOTE — PROGRESS NOTE ADULT - ATTENDING COMMENTS
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 He presented with acute STEMI. Found to have 90% stenosis of saphenous vein graft to OM1, and is now s/p 1 stent. LVEDP was 29 mm Hg. He returned to CCU with IABP and TVP. Given recent upper respiratory infection in members of the family, there is concern that his cough is due to viral URI. Standing regimen: EC aspirin 81 mg daily, carvedilol 6.25 mg every 12 hours, clopidogrel  (Plavix) 75 mg daily, heparin 1000 Units/Hr IV, albuterol/ipratropium for nebulization 3 mL Nebulizer every 6 hours as needed, benzonatate 100 mg every 8 hours as needed for cough, guaifenesin syrup 200 mg oral every 6 hours as needed for cough, hydrocodone/homatropine syrup 5 mL orally every 6 hours as needed for cough, loratadine 10 mg daily, gabapentin 400 mg daily, atorvastatin 80 mg daily at bedtime, insulin glargine Injectable (Lantus) 20 Units subcutaneously at bedtime, insulin lispro (Humalog) corrective regimen sliding scale three times a day before meals and at bedtime

## 2019-05-26 DIAGNOSIS — R05 COUGH: ICD-10-CM

## 2019-05-26 LAB
ALBUMIN SERPL ELPH-MCNC: 3.5 G/DL — SIGNIFICANT CHANGE UP (ref 3.3–5)
ALP SERPL-CCNC: 58 U/L — SIGNIFICANT CHANGE UP (ref 40–120)
ALT FLD-CCNC: 32 U/L — SIGNIFICANT CHANGE UP (ref 4–41)
ANION GAP SERPL CALC-SCNC: 12 MMO/L — SIGNIFICANT CHANGE UP (ref 7–14)
APTT BLD: 30.6 SEC — SIGNIFICANT CHANGE UP (ref 27.5–36.3)
AST SERPL-CCNC: 33 U/L — SIGNIFICANT CHANGE UP (ref 4–40)
B PERT DNA SPEC QL NAA+PROBE: NOT DETECTED — SIGNIFICANT CHANGE UP
BILIRUB SERPL-MCNC: 0.5 MG/DL — SIGNIFICANT CHANGE UP (ref 0.2–1.2)
BUN SERPL-MCNC: 40 MG/DL — HIGH (ref 7–23)
C PNEUM DNA SPEC QL NAA+PROBE: NOT DETECTED — SIGNIFICANT CHANGE UP
CALCIUM SERPL-MCNC: 9.1 MG/DL — SIGNIFICANT CHANGE UP (ref 8.4–10.5)
CHLORIDE SERPL-SCNC: 99 MMOL/L — SIGNIFICANT CHANGE UP (ref 98–107)
CK MB BLD-MCNC: 1.9 — SIGNIFICANT CHANGE UP (ref 0–2.5)
CK MB BLD-MCNC: 4.46 NG/ML — SIGNIFICANT CHANGE UP (ref 1–6.6)
CK SERPL-CCNC: 230 U/L — HIGH (ref 30–200)
CO2 SERPL-SCNC: 25 MMOL/L — SIGNIFICANT CHANGE UP (ref 22–31)
CREAT SERPL-MCNC: 1.84 MG/DL — HIGH (ref 0.5–1.3)
FLUAV H1 2009 PAND RNA SPEC QL NAA+PROBE: NOT DETECTED — SIGNIFICANT CHANGE UP
FLUAV H1 RNA SPEC QL NAA+PROBE: NOT DETECTED — SIGNIFICANT CHANGE UP
FLUAV H3 RNA SPEC QL NAA+PROBE: NOT DETECTED — SIGNIFICANT CHANGE UP
FLUAV SUBTYP SPEC NAA+PROBE: NOT DETECTED — SIGNIFICANT CHANGE UP
FLUBV RNA SPEC QL NAA+PROBE: NOT DETECTED — SIGNIFICANT CHANGE UP
GLUCOSE BLDC GLUCOMTR-MCNC: 108 MG/DL — HIGH (ref 70–99)
GLUCOSE BLDC GLUCOMTR-MCNC: 112 MG/DL — HIGH (ref 70–99)
GLUCOSE BLDC GLUCOMTR-MCNC: 130 MG/DL — HIGH (ref 70–99)
GLUCOSE BLDC GLUCOMTR-MCNC: 72 MG/DL — SIGNIFICANT CHANGE UP (ref 70–99)
GLUCOSE SERPL-MCNC: 78 MG/DL — SIGNIFICANT CHANGE UP (ref 70–99)
HADV DNA SPEC QL NAA+PROBE: NOT DETECTED — SIGNIFICANT CHANGE UP
HCOV PNL SPEC NAA+PROBE: SIGNIFICANT CHANGE UP
HCT VFR BLD CALC: 35.1 % — LOW (ref 39–50)
HGB BLD-MCNC: 11.7 G/DL — LOW (ref 13–17)
HMPV RNA SPEC QL NAA+PROBE: NOT DETECTED — SIGNIFICANT CHANGE UP
HPIV1 RNA SPEC QL NAA+PROBE: NOT DETECTED — SIGNIFICANT CHANGE UP
HPIV2 RNA SPEC QL NAA+PROBE: NOT DETECTED — SIGNIFICANT CHANGE UP
HPIV3 RNA SPEC QL NAA+PROBE: NOT DETECTED — SIGNIFICANT CHANGE UP
HPIV4 RNA SPEC QL NAA+PROBE: NOT DETECTED — SIGNIFICANT CHANGE UP
LACTATE SERPL-SCNC: 1.2 MMOL/L — SIGNIFICANT CHANGE UP (ref 0.5–2)
MAGNESIUM SERPL-MCNC: 2.2 MG/DL — SIGNIFICANT CHANGE UP (ref 1.6–2.6)
MCHC RBC-ENTMCNC: 29.8 PG — SIGNIFICANT CHANGE UP (ref 27–34)
MCHC RBC-ENTMCNC: 33.3 % — SIGNIFICANT CHANGE UP (ref 32–36)
MCV RBC AUTO: 89.3 FL — SIGNIFICANT CHANGE UP (ref 80–100)
NRBC # FLD: 0 K/UL — SIGNIFICANT CHANGE UP (ref 0–0)
PHOSPHATE SERPL-MCNC: 3.8 MG/DL — SIGNIFICANT CHANGE UP (ref 2.5–4.5)
PLATELET # BLD AUTO: 236 K/UL — SIGNIFICANT CHANGE UP (ref 150–400)
PMV BLD: 10.1 FL — SIGNIFICANT CHANGE UP (ref 7–13)
POTASSIUM SERPL-MCNC: 4.3 MMOL/L — SIGNIFICANT CHANGE UP (ref 3.5–5.3)
POTASSIUM SERPL-SCNC: 4.3 MMOL/L — SIGNIFICANT CHANGE UP (ref 3.5–5.3)
PROT SERPL-MCNC: 7.1 G/DL — SIGNIFICANT CHANGE UP (ref 6–8.3)
RBC # BLD: 3.93 M/UL — LOW (ref 4.2–5.8)
RBC # FLD: 13.6 % — SIGNIFICANT CHANGE UP (ref 10.3–14.5)
RSV RNA SPEC QL NAA+PROBE: NOT DETECTED — SIGNIFICANT CHANGE UP
RV+EV RNA SPEC QL NAA+PROBE: NOT DETECTED — SIGNIFICANT CHANGE UP
SODIUM SERPL-SCNC: 136 MMOL/L — SIGNIFICANT CHANGE UP (ref 135–145)
TROPONIN T, HIGH SENSITIVITY: 848 NG/L — CRITICAL HIGH (ref ?–14)
WBC # BLD: 13.25 K/UL — HIGH (ref 3.8–10.5)
WBC # FLD AUTO: 13.25 K/UL — HIGH (ref 3.8–10.5)

## 2019-05-26 PROCEDURE — 71045 X-RAY EXAM CHEST 1 VIEW: CPT | Mod: 26

## 2019-05-26 PROCEDURE — 99233 SBSQ HOSP IP/OBS HIGH 50: CPT | Mod: GC

## 2019-05-26 RX ORDER — LANOLIN ALCOHOL/MO/W.PET/CERES
3 CREAM (GRAM) TOPICAL AT BEDTIME
Refills: 0 | Status: DISCONTINUED | OUTPATIENT
Start: 2019-05-26 | End: 2019-05-29

## 2019-05-26 RX ORDER — HYDRALAZINE HCL 50 MG
25 TABLET ORAL EVERY 8 HOURS
Refills: 0 | Status: DISCONTINUED | OUTPATIENT
Start: 2019-05-26 | End: 2019-05-28

## 2019-05-26 RX ORDER — HEPARIN SODIUM 5000 [USP'U]/ML
5000 INJECTION INTRAVENOUS; SUBCUTANEOUS EVERY 12 HOURS
Refills: 0 | Status: DISCONTINUED | OUTPATIENT
Start: 2019-05-26 | End: 2019-05-29

## 2019-05-26 RX ORDER — SENNA PLUS 8.6 MG/1
2 TABLET ORAL AT BEDTIME
Refills: 0 | Status: DISCONTINUED | OUTPATIENT
Start: 2019-05-26 | End: 2019-05-29

## 2019-05-26 RX ORDER — DOCUSATE SODIUM 100 MG
100 CAPSULE ORAL THREE TIMES A DAY
Refills: 0 | Status: DISCONTINUED | OUTPATIENT
Start: 2019-05-26 | End: 2019-05-27

## 2019-05-26 RX ORDER — POLYETHYLENE GLYCOL 3350 17 G/17G
17 POWDER, FOR SOLUTION ORAL DAILY
Refills: 0 | Status: DISCONTINUED | OUTPATIENT
Start: 2019-05-26 | End: 2019-05-28

## 2019-05-26 RX ADMIN — Medication 1 SPRAY(S): at 18:26

## 2019-05-26 RX ADMIN — Medication 100 MILLIGRAM(S): at 06:37

## 2019-05-26 RX ADMIN — CHLORHEXIDINE GLUCONATE 1 APPLICATION(S): 213 SOLUTION TOPICAL at 05:59

## 2019-05-26 RX ADMIN — CARVEDILOL PHOSPHATE 6.25 MILLIGRAM(S): 80 CAPSULE, EXTENDED RELEASE ORAL at 18:25

## 2019-05-26 RX ADMIN — Medication 100 MILLIGRAM(S): at 14:57

## 2019-05-26 RX ADMIN — Medication 100 MILLIGRAM(S): at 21:34

## 2019-05-26 RX ADMIN — Medication 25 MILLIGRAM(S): at 14:56

## 2019-05-26 RX ADMIN — BENZOCAINE AND MENTHOL 1 LOZENGE: 5; 1 LIQUID ORAL at 12:52

## 2019-05-26 RX ADMIN — SENNA PLUS 2 TABLET(S): 8.6 TABLET ORAL at 21:34

## 2019-05-26 RX ADMIN — ATORVASTATIN CALCIUM 80 MILLIGRAM(S): 80 TABLET, FILM COATED ORAL at 21:34

## 2019-05-26 RX ADMIN — CARVEDILOL PHOSPHATE 6.25 MILLIGRAM(S): 80 CAPSULE, EXTENDED RELEASE ORAL at 05:58

## 2019-05-26 RX ADMIN — Medication 100 MILLIGRAM(S): at 05:58

## 2019-05-26 RX ADMIN — Medication 100 MILLIGRAM(S): at 14:56

## 2019-05-26 RX ADMIN — Medication 1 SPRAY(S): at 05:58

## 2019-05-26 RX ADMIN — Medication 200 MILLIGRAM(S): at 06:36

## 2019-05-26 RX ADMIN — LORATADINE 10 MILLIGRAM(S): 10 TABLET ORAL at 12:51

## 2019-05-26 RX ADMIN — Medication 25 MILLIGRAM(S): at 21:34

## 2019-05-26 RX ADMIN — Medication 81 MILLIGRAM(S): at 12:51

## 2019-05-26 RX ADMIN — CLOPIDOGREL BISULFATE 75 MILLIGRAM(S): 75 TABLET, FILM COATED ORAL at 12:51

## 2019-05-26 RX ADMIN — INSULIN GLARGINE 20 UNIT(S): 100 INJECTION, SOLUTION SUBCUTANEOUS at 21:33

## 2019-05-26 RX ADMIN — Medication 25 MILLIGRAM(S): at 06:37

## 2019-05-26 RX ADMIN — BENZOCAINE AND MENTHOL 1 LOZENGE: 5; 1 LIQUID ORAL at 06:31

## 2019-05-26 RX ADMIN — GABAPENTIN 400 MILLIGRAM(S): 400 CAPSULE ORAL at 12:51

## 2019-05-26 NOTE — PROGRESS NOTE ADULT - PROBLEM SELECTOR PLAN 7
Patient reports that he's "had this cough for a while". Family member reports "everyone in the family has had a cough". Pt reports that family member who is a physician prescribed him a z-pack last week. 2/2 URI vs post-nasal drip.  Symptomatic treatment.  - c/w tessalon perle  - c/w cepacol lozenge  - c/w flonase  - c/w robitussin  - c/w hycodan   - c/w duonebs as needed

## 2019-05-26 NOTE — PROGRESS NOTE ADULT - ATTENDING COMMENTS
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 He presented with acute STEMI. Found to have 90% stenosis of saphenous vein graft to OM1, and is now s/p 1 stent. LVEDP was 29 mm Hg. He returned to CCU with IABP and TVP. Given recent upper respiratory infection in members of the family, there is concern that his cough is due to viral URI. Standing regimen: EC aspirin 81 mg daily, carvedilol 6.25 mg every 12 hours, clopidogrel  (Plavix) 75 mg daily, heparin 1000 Units/Hr IV, albuterol/ipratropium for nebulization 3 mL Nebulizer every 6 hours as needed, benzonatate 100 mg every 8 hours as needed for cough, guaifenesin syrup 200 mg oral every 6 hours as needed for cough, hydrocodone/ homatropine syrup 5 mL orally every 6 hours as needed for cough, loratadine 10 mg daily, gabapentin 400 mg daily, atorvastatin 80 mg daily at bedtime, insulin glargine Injectable (Lantus) 20 Units subcutaneously at bedtime, insulin lispro (Humalog) corrective regimen sliding scale three times a day before meals and at bedtime

## 2019-05-26 NOTE — PROGRESS NOTE ADULT - PROBLEM SELECTOR PLAN 1
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 Pt returned to CCU with IABP and TVP.   - continue with DAPT; aspirin and plavix, and high intensity statin lipitor 80  - plan to d/c IABP today (heparin held at midnight in preparation for balloon pump removal)  - TVP d/c'd yesterday   - a total of lasix 60mg IVP given for SOB

## 2019-05-26 NOTE — PROGRESS NOTE ADULT - SUBJECTIVE AND OBJECTIVE BOX
PATIENT:  SHAIK CHARANJIT  [id number]    CHIEF COMPLAINT:  Patient is a 85y old  Male who presents with a chief complaint of chest pain and shortness of breath (25 May 2019 12:05)      INTERVAL HISTORYOVERNIGHT EVENTS:  Persistent coughing overnight. Received Lasix, put out -1L, felt a little better.      MEDICATIONS:  MEDICATIONS  (STANDING):  aspirin enteric coated 81 milliGRAM(s) Oral daily  atorvastatin 80 milliGRAM(s) Oral at bedtime  carvedilol 6.25 milliGRAM(s) Oral every 12 hours  chlorhexidine 4% Liquid 1 Application(s) Topical <User Schedule>  clopidogrel Tablet 75 milliGRAM(s) Oral daily  docusate sodium 100 milliGRAM(s) Oral three times a day  fluticasone propionate 50 MICROgram(s)/spray Nasal Spray 1 Spray(s) Both Nostrils two times a day  gabapentin 400 milliGRAM(s) Oral daily  hydrALAZINE 25 milliGRAM(s) Oral every 8 hours  insulin glargine Injectable (LANTUS) 20 Unit(s) SubCutaneous at bedtime  insulin lispro (HumaLOG) corrective regimen sliding scale   SubCutaneous three times a day before meals  insulin lispro (HumaLOG) corrective regimen sliding scale   SubCutaneous at bedtime  loratadine 10 milliGRAM(s) Oral daily  senna 2 Tablet(s) Oral at bedtime    MEDICATIONS  (PRN):  ALBUTerol/ipratropium for Nebulization 3 milliLiter(s) Nebulizer every 6 hours PRN Shortness of Breath and/or Wheezing  benzocaine 15 mG/menthol 3.6 mG Lozenge 1 Lozenge Oral every 3 hours PRN Sore Throat  benzonatate 100 milliGRAM(s) Oral every 8 hours PRN Cough  glucagon  Injectable 1 milliGRAM(s) IntraMuscular once PRN Glucose LESS THAN 70 milligrams/deciliter  guaiFENesin    Syrup 200 milliGRAM(s) Oral every 6 hours PRN Cough  HYDROcodone/homatropine Syrup 5 milliLiter(s) Oral every 6 hours PRN Cough  polyethylene glycol 3350 17 Gram(s) Oral daily PRN Constipation      ALLERGIES:  Allergies    carbamazepine (Other)  Cipro (Unknown)  fluoroquinolone antibiotics (Other)  Tegretol (Unknown)    Intolerances        OBJECTIVE:  ICU Vital Signs Last 24 Hrs  T(C): 36.6 (26 May 2019 04:00), Max: 37 (25 May 2019 19:59)  T(F): 97.8 (26 May 2019 04:00), Max: 98.6 (25 May 2019 19:59)  HR: 67 (26 May 2019 03:00) (67 - 83)  BP: 148/94 (26 May 2019 00:00) (129/84 - 158/89)  BP(mean): 108 (26 May 2019 00:00) (81 - 109)  ABP: --  ABP(mean): --  RR: 17 (26 May 2019 03:00) (15 - 31)  SpO2: 93% (26 May 2019 03:00) (93% - 100%)        CAPILLARY BLOOD GLUCOSE  POCT Blood Glucose.: 72 mg/dL (26 May 2019 07:50)  POCT Blood Glucose.: 108 mg/dL (25 May 2019 22:08)  POCT Blood Glucose.: 108 mg/dL (25 May 2019 16:40)  POCT Blood Glucose.: 209 mg/dL (25 May 2019 11:43)  POCT Blood Glucose.: 189 mg/dL (25 May 2019 08:00)      I&O's Summary    25 May 2019 07:01  -  26 May 2019 07:00  --------------------------------------------------------  IN: 600 mL / OUT: 3075 mL / NET: -2475 mL      Daily     Daily Weight in k.5 (26 May 2019 07:00)    PHYSICAL EXAMINATION:  GENERAL: NAD, well-developed  HEAD:  Atraumatic, Normocephalic  EYES: EOMI, PERRLA, conjunctiva and sclera clear  NECK: Supple, No JVD  CHEST/LUNG: crackles at the bases  HEART: balloon pump in place Regular rate and rhythm; S1, S2; No murmurs, rubs, or gallops  ABDOMEN: Soft, Nontender, Nondistended; Normoactive bowel sounds  EXTREMITIES:  2+ Peripheral Pulses, No clubbing, cyanosis, or edema  PSYCH: A&Ox3  NEUROLOGY: non-focal  SKIN: No rashes or lesions    LABS:  ABG - ( 24 May 2019 14:48 )  pH, Arterial: 7.33  pH, Blood: x     /  pCO2: 40    /  pO2: 133   / HCO3: 21    / Base Excess: -4.6  /  SaO2: 98.3                            11.7   13.25 )-----------( 236      ( 26 May 2019 05:19 )             35.1         136  |  99  |  40<H>  ----------------------------<  78  4.3   |  25  |  1.84<H>    Ca    9.1      26 May 2019 05:19  Phos  3.8       Mg     2.2         TPro  7.1  /  Alb  3.5  /  TBili  0.5  /  DBili  x   /  AST  33  /  ALT  32  /  AlkPhos  58      LIVER FUNCTIONS - ( 26 May 2019 05:19 )  Alb: 3.5 g/dL / Pro: 7.1 g/dL / ALK PHOS: 58 u/L / ALT: 32 u/L / AST: 33 u/L / GGT: x           PT/INR - ( 24 May 2019 12:55 )   PT: 13.1 SEC;   INR: 1.14          PTT - ( 26 May 2019 05:19 )  PTT:30.6 SEC  Creatine Kinase, Serum: 230 u/L ( @ 05:19)  CKMB: 4.46 ng/mL ( @ 05:19)  CKMB Relative Index: 1.9 ( @ 05:19)    CARDIAC MARKERS ( 26 May 2019 05:19 )  x     / x     / 230 u/L / 4.46 ng/mL / x      CARDIAC MARKERS ( 25 May 2019 06:15 )  x     / x     / 192 u/L / 5.10 ng/mL / x      CARDIAC MARKERS ( 24 May 2019 23:30 )  x     / x     / 169 u/L / 4.96 ng/mL / x      CARDIAC MARKERS ( 24 May 2019 12:55 )  x     / x     / 155 u/L / 4.44 ng/mL / x              TELEMETRY:     EKG:     IMAGING: PATIENT:  SHAIK CHARANJIT  [id number]    CHIEF COMPLAINT:  Patient is a 85y old  Male who presents with a chief complaint of chest pain and shortness of breath (25 May 2019 12:05)      INTERVAL HISTORYOVERNIGHT EVENTS:  Persistent coughing overnight. Received Lasix, put out -1L, felt a little better.    Patient's family members requesting Urology consult and pelvic ultrasound, as they report that patient had scrotal bleeding x weeks from what appeared to be an abrasion. Patient had stopped his ASA and Plavix 1 week prior to admission due to the scrotal bleeding. Patient had seen an outside urologist who ordered a pelvic ultrasound to r/o perineal abscess. Family requesting pelvic u/s.       MEDICATIONS:  MEDICATIONS  (STANDING):  aspirin enteric coated 81 milliGRAM(s) Oral daily  atorvastatin 80 milliGRAM(s) Oral at bedtime  carvedilol 6.25 milliGRAM(s) Oral every 12 hours  chlorhexidine 4% Liquid 1 Application(s) Topical <User Schedule>  clopidogrel Tablet 75 milliGRAM(s) Oral daily  docusate sodium 100 milliGRAM(s) Oral three times a day  fluticasone propionate 50 MICROgram(s)/spray Nasal Spray 1 Spray(s) Both Nostrils two times a day  gabapentin 400 milliGRAM(s) Oral daily  hydrALAZINE 25 milliGRAM(s) Oral every 8 hours  insulin glargine Injectable (LANTUS) 20 Unit(s) SubCutaneous at bedtime  insulin lispro (HumaLOG) corrective regimen sliding scale   SubCutaneous three times a day before meals  insulin lispro (HumaLOG) corrective regimen sliding scale   SubCutaneous at bedtime  loratadine 10 milliGRAM(s) Oral daily  senna 2 Tablet(s) Oral at bedtime    MEDICATIONS  (PRN):  ALBUTerol/ipratropium for Nebulization 3 milliLiter(s) Nebulizer every 6 hours PRN Shortness of Breath and/or Wheezing  benzocaine 15 mG/menthol 3.6 mG Lozenge 1 Lozenge Oral every 3 hours PRN Sore Throat  benzonatate 100 milliGRAM(s) Oral every 8 hours PRN Cough  glucagon  Injectable 1 milliGRAM(s) IntraMuscular once PRN Glucose LESS THAN 70 milligrams/deciliter  guaiFENesin    Syrup 200 milliGRAM(s) Oral every 6 hours PRN Cough  HYDROcodone/homatropine Syrup 5 milliLiter(s) Oral every 6 hours PRN Cough  polyethylene glycol 3350 17 Gram(s) Oral daily PRN Constipation      ALLERGIES:  Allergies    carbamazepine (Other)  Cipro (Unknown)  fluoroquinolone antibiotics (Other)  Tegretol (Unknown)    Intolerances        OBJECTIVE:  ICU Vital Signs Last 24 Hrs  T(C): 36.6 (26 May 2019 04:00), Max: 37 (25 May 2019 19:59)  T(F): 97.8 (26 May 2019 04:00), Max: 98.6 (25 May 2019 19:59)  HR: 67 (26 May 2019 03:00) (67 - 83)  BP: 148/94 (26 May 2019 00:00) (129/84 - 158/89)  BP(mean): 108 (26 May 2019 00:00) (81 - 109)  ABP: --  ABP(mean): --  RR: 17 (26 May 2019 03:00) (15 - 31)  SpO2: 93% (26 May 2019 03:00) (93% - 100%)        CAPILLARY BLOOD GLUCOSE  POCT Blood Glucose.: 72 mg/dL (26 May 2019 07:50)  POCT Blood Glucose.: 108 mg/dL (25 May 2019 22:08)  POCT Blood Glucose.: 108 mg/dL (25 May 2019 16:40)  POCT Blood Glucose.: 209 mg/dL (25 May 2019 11:43)  POCT Blood Glucose.: 189 mg/dL (25 May 2019 08:00)      I&O's Summary    25 May 2019 07:01  -  26 May 2019 07:00  --------------------------------------------------------  IN: 600 mL / OUT: 3075 mL / NET: -2475 mL      Daily     Daily Weight in k.5 (26 May 2019 07:00)    PHYSICAL EXAMINATION:  GENERAL: NAD, well-developed  HEAD:  Atraumatic, Normocephalic  EYES: EOMI, PERRLA, conjunctiva and sclera clear  NECK: Supple, No JVD  CHEST/LUNG: crackles at the bases  HEART: balloon pump in place Regular rate and rhythm; S1, S2; No murmurs, rubs, or gallops  ABDOMEN: Soft, Nontender, Nondistended; Normoactive bowel sounds  EXTREMITIES:  2+ Peripheral Pulses, No clubbing, cyanosis, or edema  PSYCH: A&Ox3  NEUROLOGY: non-focal  SKIN: No rashes or lesions. Patient and family refuse exam of scrotum    LABS:  ABG - ( 24 May 2019 14:48 )  pH, Arterial: 7.33  pH, Blood: x     /  pCO2: 40    /  pO2: 133   / HCO3: 21    / Base Excess: -4.6  /  SaO2: 98.3                            11.7   13.25 )-----------( 236      ( 26 May 2019 05:19 )             35.1         136  |  99  |  40<H>  ----------------------------<  78  4.3   |  25  |  1.84<H>    Ca    9.1      26 May 2019 05:19  Phos  3.8       Mg     2.2         TPro  7.1  /  Alb  3.5  /  TBili  0.5  /  DBili  x   /  AST  33  /  ALT  32  /  AlkPhos  58  26    LIVER FUNCTIONS - ( 26 May 2019 05:19 )  Alb: 3.5 g/dL / Pro: 7.1 g/dL / ALK PHOS: 58 u/L / ALT: 32 u/L / AST: 33 u/L / GGT: x           PT/INR - ( 24 May 2019 12:55 )   PT: 13.1 SEC;   INR: 1.14          PTT - ( 26 May 2019 05:19 )  PTT:30.6 SEC  Creatine Kinase, Serum: 230 u/L ( @ 05:19)  CKMB: 4.46 ng/mL ( @ 05:19)  CKMB Relative Index: 1.9 ( @ 05:19)    CARDIAC MARKERS ( 26 May 2019 05:19 )  x     / x     / 230 u/L / 4.46 ng/mL / x      CARDIAC MARKERS ( 25 May 2019 06:15 )  x     / x     / 192 u/L / 5.10 ng/mL / x      CARDIAC MARKERS ( 24 May 2019 23:30 )  x     / x     / 169 u/L / 4.96 ng/mL / x      CARDIAC MARKERS ( 24 May 2019 12:55 )  x     / x     / 155 u/L / 4.44 ng/mL / x              TELEMETRY: reviewed    EKG: reviewed    IMAGING:     EXAM:  XR CHEST PORTABLE ROUTINE 1V    PROCEDURE DATE:  May 26 2019   INTERPRETATION:  CLINICAL INDICATION: Balloon pump.  Frontal view of the chest is obtained.  Comparison is made with May 25, 2019.    IMPRESSION: The patient is status post sternotomy. The heart size appears   enlarged. There is an IABP within the proximal the descending thoracic   aorta as on the prior study.    The lungs are clear. There is no pneumothorax. PATIENT:  SHAIK CHARANJIT  [id number]    CHIEF COMPLAINT:  Patient is a 85y old  Male who presents with a chief complaint of chest pain and shortness of breath (25 May 2019 12:05)      INTERVAL HISTORYOVERNIGHT EVENTS:  Persistent coughing overnight. Received Lasix, put out -1L, felt a little better.    Patient's family members requesting Urology consult and pelvic ultrasound, as they report that patient had scrotal bleeding x weeks from what appeared to be an abrasion. Patient had stopped his ASA and Plavix 1 week prior to admission due to the scrotal bleeding. Patient had seen an outside urologist who ordered a pelvic ultrasound to r/o perineal abscess. Family requesting pelvic u/s.    Patient's PCP is a relative, who reports that his baseline Cr is around 1.7       MEDICATIONS:  MEDICATIONS  (STANDING):  aspirin enteric coated 81 milliGRAM(s) Oral daily  atorvastatin 80 milliGRAM(s) Oral at bedtime  carvedilol 6.25 milliGRAM(s) Oral every 12 hours  chlorhexidine 4% Liquid 1 Application(s) Topical <User Schedule>  clopidogrel Tablet 75 milliGRAM(s) Oral daily  docusate sodium 100 milliGRAM(s) Oral three times a day  fluticasone propionate 50 MICROgram(s)/spray Nasal Spray 1 Spray(s) Both Nostrils two times a day  gabapentin 400 milliGRAM(s) Oral daily  hydrALAZINE 25 milliGRAM(s) Oral every 8 hours  insulin glargine Injectable (LANTUS) 20 Unit(s) SubCutaneous at bedtime  insulin lispro (HumaLOG) corrective regimen sliding scale   SubCutaneous three times a day before meals  insulin lispro (HumaLOG) corrective regimen sliding scale   SubCutaneous at bedtime  loratadine 10 milliGRAM(s) Oral daily  senna 2 Tablet(s) Oral at bedtime    MEDICATIONS  (PRN):  ALBUTerol/ipratropium for Nebulization 3 milliLiter(s) Nebulizer every 6 hours PRN Shortness of Breath and/or Wheezing  benzocaine 15 mG/menthol 3.6 mG Lozenge 1 Lozenge Oral every 3 hours PRN Sore Throat  benzonatate 100 milliGRAM(s) Oral every 8 hours PRN Cough  glucagon  Injectable 1 milliGRAM(s) IntraMuscular once PRN Glucose LESS THAN 70 milligrams/deciliter  guaiFENesin    Syrup 200 milliGRAM(s) Oral every 6 hours PRN Cough  HYDROcodone/homatropine Syrup 5 milliLiter(s) Oral every 6 hours PRN Cough  polyethylene glycol 3350 17 Gram(s) Oral daily PRN Constipation      ALLERGIES:  Allergies    carbamazepine (Other)  Cipro (Unknown)  fluoroquinolone antibiotics (Other)  Tegretol (Unknown)    Intolerances        OBJECTIVE:  ICU Vital Signs Last 24 Hrs  T(C): 36.6 (26 May 2019 04:00), Max: 37 (25 May 2019 19:59)  T(F): 97.8 (26 May 2019 04:00), Max: 98.6 (25 May 2019 19:59)  HR: 67 (26 May 2019 03:00) (67 - 83)  BP: 148/94 (26 May 2019 00:00) (129/84 - 158/89)  BP(mean): 108 (26 May 2019 00:00) (81 - 109)  ABP: --  ABP(mean): --  RR: 17 (26 May 2019 03:00) (15 - 31)  SpO2: 93% (26 May 2019 03:00) (93% - 100%)        CAPILLARY BLOOD GLUCOSE  POCT Blood Glucose.: 72 mg/dL (26 May 2019 07:50)  POCT Blood Glucose.: 108 mg/dL (25 May 2019 22:08)  POCT Blood Glucose.: 108 mg/dL (25 May 2019 16:40)  POCT Blood Glucose.: 209 mg/dL (25 May 2019 11:43)  POCT Blood Glucose.: 189 mg/dL (25 May 2019 08:00)      I&O's Summary    25 May 2019 07:01  -  26 May 2019 07:00  --------------------------------------------------------  IN: 600 mL / OUT: 3075 mL / NET: -2475 mL      Daily     Daily Weight in k.5 (26 May 2019 07:00)    PHYSICAL EXAMINATION:  GENERAL: NAD, well-developed  HEAD:  Atraumatic, Normocephalic  EYES: EOMI, PERRLA, conjunctiva and sclera clear  NECK: Supple, No JVD  CHEST/LUNG: crackles at the bases  HEART: balloon pump in place Regular rate and rhythm; S1, S2; No murmurs, rubs, or gallops  ABDOMEN: Soft, Nontender, Nondistended; Normoactive bowel sounds  EXTREMITIES:  2+ Peripheral Pulses, No clubbing, cyanosis, or edema  PSYCH: A&Ox3  NEUROLOGY: non-focal  SKIN: No rashes or lesions. Patient and family refuse exam of scrotum    LABS:  ABG - ( 24 May 2019 14:48 )  pH, Arterial: 7.33  pH, Blood: x     /  pCO2: 40    /  pO2: 133   / HCO3: 21    / Base Excess: -4.6  /  SaO2: 98.3                            11.7   13.25 )-----------( 236      ( 26 May 2019 05:19 )             35.1         136  |  99  |  40<H>  ----------------------------<  78  4.3   |  25  |  1.84<H>    Ca    9.1      26 May 2019 05:19  Phos  3.8       Mg     2.2         TPro  7.1  /  Alb  3.5  /  TBili  0.5  /  DBili  x   /  AST  33  /  ALT  32  /  AlkPhos  58  26    LIVER FUNCTIONS - ( 26 May 2019 05:19 )  Alb: 3.5 g/dL / Pro: 7.1 g/dL / ALK PHOS: 58 u/L / ALT: 32 u/L / AST: 33 u/L / GGT: x           PT/INR - ( 24 May 2019 12:55 )   PT: 13.1 SEC;   INR: 1.14          PTT - ( 26 May 2019 05:19 )  PTT:30.6 SEC  Creatine Kinase, Serum: 230 u/L ( @ 05:19)  CKMB: 4.46 ng/mL ( @ 05:19)  CKMB Relative Index: 1.9 ( @ 05:19)    CARDIAC MARKERS ( 26 May 2019 05:19 )  x     / x     / 230 u/L / 4.46 ng/mL / x      CARDIAC MARKERS ( 25 May 2019 06:15 )  x     / x     / 192 u/L / 5.10 ng/mL / x      CARDIAC MARKERS ( 24 May 2019 23:30 )  x     / x     / 169 u/L / 4.96 ng/mL / x      CARDIAC MARKERS ( 24 May 2019 12:55 )  x     / x     / 155 u/L / 4.44 ng/mL / x              TELEMETRY: reviewed    EKG: reviewed    IMAGING:     EXAM:  XR CHEST PORTABLE ROUTINE 1V    PROCEDURE DATE:  May 26 2019   INTERPRETATION:  CLINICAL INDICATION: Balloon pump.  Frontal view of the chest is obtained.  Comparison is made with May 25, 2019.    IMPRESSION: The patient is status post sternotomy. The heart size appears   enlarged. There is an IABP within the proximal the descending thoracic   aorta as on the prior study.    The lungs are clear. There is no pneumothorax.

## 2019-05-26 NOTE — PROGRESS NOTE ADULT - PROBLEM SELECTOR PLAN 2
- continue with coreg for now and hold ACE/ARB, consider restarting tomorrow  - pt on lisinopril at home; consider switching to an ARB in setting of cough

## 2019-05-27 ENCOUNTER — TRANSCRIPTION ENCOUNTER (OUTPATIENT)
Age: 84
End: 2019-05-27

## 2019-05-27 LAB
ALBUMIN SERPL ELPH-MCNC: 3.5 G/DL — SIGNIFICANT CHANGE UP (ref 3.3–5)
ALP SERPL-CCNC: 62 U/L — SIGNIFICANT CHANGE UP (ref 40–120)
ALT FLD-CCNC: 26 U/L — SIGNIFICANT CHANGE UP (ref 4–41)
ANION GAP SERPL CALC-SCNC: 10 MMO/L — SIGNIFICANT CHANGE UP (ref 7–14)
AST SERPL-CCNC: 27 U/L — SIGNIFICANT CHANGE UP (ref 4–40)
BASOPHILS # BLD AUTO: 0.06 K/UL — SIGNIFICANT CHANGE UP (ref 0–0.2)
BASOPHILS NFR BLD AUTO: 0.5 % — SIGNIFICANT CHANGE UP (ref 0–2)
BILIRUB SERPL-MCNC: 0.4 MG/DL — SIGNIFICANT CHANGE UP (ref 0.2–1.2)
BUN SERPL-MCNC: 30 MG/DL — HIGH (ref 7–23)
CALCIUM SERPL-MCNC: 8.8 MG/DL — SIGNIFICANT CHANGE UP (ref 8.4–10.5)
CHLORIDE SERPL-SCNC: 98 MMOL/L — SIGNIFICANT CHANGE UP (ref 98–107)
CK MB BLD-MCNC: 3.68 NG/ML — SIGNIFICANT CHANGE UP (ref 1–6.6)
CK MB BLD-MCNC: SIGNIFICANT CHANGE UP (ref 0–2.5)
CK SERPL-CCNC: 133 U/L — SIGNIFICANT CHANGE UP (ref 30–200)
CO2 SERPL-SCNC: 24 MMOL/L — SIGNIFICANT CHANGE UP (ref 22–31)
CREAT SERPL-MCNC: 1.57 MG/DL — HIGH (ref 0.5–1.3)
EOSINOPHIL # BLD AUTO: 0.28 K/UL — SIGNIFICANT CHANGE UP (ref 0–0.5)
EOSINOPHIL NFR BLD AUTO: 2.6 % — SIGNIFICANT CHANGE UP (ref 0–6)
GLUCOSE BLDC GLUCOMTR-MCNC: 148 MG/DL — HIGH (ref 70–99)
GLUCOSE BLDC GLUCOMTR-MCNC: 215 MG/DL — HIGH (ref 70–99)
GLUCOSE BLDC GLUCOMTR-MCNC: 88 MG/DL — SIGNIFICANT CHANGE UP (ref 70–99)
GLUCOSE BLDC GLUCOMTR-MCNC: 93 MG/DL — SIGNIFICANT CHANGE UP (ref 70–99)
GLUCOSE SERPL-MCNC: 141 MG/DL — HIGH (ref 70–99)
HCT VFR BLD CALC: 37.7 % — LOW (ref 39–50)
HGB BLD-MCNC: 12.3 G/DL — LOW (ref 13–17)
IMM GRANULOCYTES NFR BLD AUTO: 1.2 % — SIGNIFICANT CHANGE UP (ref 0–1.5)
LYMPHOCYTES # BLD AUTO: 1.92 K/UL — SIGNIFICANT CHANGE UP (ref 1–3.3)
LYMPHOCYTES # BLD AUTO: 17.5 % — SIGNIFICANT CHANGE UP (ref 13–44)
MAGNESIUM SERPL-MCNC: 2.3 MG/DL — SIGNIFICANT CHANGE UP (ref 1.6–2.6)
MCHC RBC-ENTMCNC: 29.1 PG — SIGNIFICANT CHANGE UP (ref 27–34)
MCHC RBC-ENTMCNC: 32.6 % — SIGNIFICANT CHANGE UP (ref 32–36)
MCV RBC AUTO: 89.1 FL — SIGNIFICANT CHANGE UP (ref 80–100)
MONOCYTES # BLD AUTO: 0.97 K/UL — HIGH (ref 0–0.9)
MONOCYTES NFR BLD AUTO: 8.8 % — SIGNIFICANT CHANGE UP (ref 2–14)
NEUTROPHILS # BLD AUTO: 7.61 K/UL — HIGH (ref 1.8–7.4)
NEUTROPHILS NFR BLD AUTO: 69.4 % — SIGNIFICANT CHANGE UP (ref 43–77)
NRBC # FLD: 0 K/UL — SIGNIFICANT CHANGE UP (ref 0–0)
PHOSPHATE SERPL-MCNC: 3.7 MG/DL — SIGNIFICANT CHANGE UP (ref 2.5–4.5)
PLATELET # BLD AUTO: 248 K/UL — SIGNIFICANT CHANGE UP (ref 150–400)
PMV BLD: 10.2 FL — SIGNIFICANT CHANGE UP (ref 7–13)
POTASSIUM SERPL-MCNC: 4.3 MMOL/L — SIGNIFICANT CHANGE UP (ref 3.5–5.3)
POTASSIUM SERPL-SCNC: 4.3 MMOL/L — SIGNIFICANT CHANGE UP (ref 3.5–5.3)
PROT SERPL-MCNC: 7.3 G/DL — SIGNIFICANT CHANGE UP (ref 6–8.3)
RBC # BLD: 4.23 M/UL — SIGNIFICANT CHANGE UP (ref 4.2–5.8)
RBC # FLD: 13.7 % — SIGNIFICANT CHANGE UP (ref 10.3–14.5)
SODIUM SERPL-SCNC: 132 MMOL/L — LOW (ref 135–145)
WBC # BLD: 10.97 K/UL — HIGH (ref 3.8–10.5)
WBC # FLD AUTO: 10.97 K/UL — HIGH (ref 3.8–10.5)

## 2019-05-27 PROCEDURE — 93306 TTE W/DOPPLER COMPLETE: CPT | Mod: 26

## 2019-05-27 RX ORDER — DOCUSATE SODIUM 100 MG
100 CAPSULE ORAL
Refills: 0 | Status: DISCONTINUED | OUTPATIENT
Start: 2019-05-27 | End: 2019-05-29

## 2019-05-27 RX ORDER — LOSARTAN POTASSIUM 100 MG/1
25 TABLET, FILM COATED ORAL DAILY
Refills: 0 | Status: DISCONTINUED | OUTPATIENT
Start: 2019-05-27 | End: 2019-05-29

## 2019-05-27 RX ORDER — IPRATROPIUM/ALBUTEROL SULFATE 18-103MCG
3 AEROSOL WITH ADAPTER (GRAM) INHALATION EVERY 4 HOURS
Refills: 0 | Status: DISCONTINUED | OUTPATIENT
Start: 2019-05-27 | End: 2019-05-29

## 2019-05-27 RX ORDER — SODIUM CHLORIDE 9 MG/ML
3 INJECTION INTRAMUSCULAR; INTRAVENOUS; SUBCUTANEOUS
Refills: 0 | Status: DISCONTINUED | OUTPATIENT
Start: 2019-05-27 | End: 2019-05-29

## 2019-05-27 RX ORDER — PANTOPRAZOLE SODIUM 20 MG/1
40 TABLET, DELAYED RELEASE ORAL
Refills: 0 | Status: DISCONTINUED | OUTPATIENT
Start: 2019-05-27 | End: 2019-05-29

## 2019-05-27 RX ADMIN — Medication 1 SPRAY(S): at 18:19

## 2019-05-27 RX ADMIN — Medication 3 MILLILITER(S): at 13:09

## 2019-05-27 RX ADMIN — CHLORHEXIDINE GLUCONATE 1 APPLICATION(S): 213 SOLUTION TOPICAL at 12:02

## 2019-05-27 RX ADMIN — Medication 1 SPRAY(S): at 05:57

## 2019-05-27 RX ADMIN — INSULIN GLARGINE 20 UNIT(S): 100 INJECTION, SOLUTION SUBCUTANEOUS at 21:21

## 2019-05-27 RX ADMIN — SODIUM CHLORIDE 3 MILLILITER(S): 9 INJECTION INTRAMUSCULAR; INTRAVENOUS; SUBCUTANEOUS at 23:20

## 2019-05-27 RX ADMIN — Medication 3 MILLILITER(S): at 16:57

## 2019-05-27 RX ADMIN — Medication 3 MILLIGRAM(S): at 21:22

## 2019-05-27 RX ADMIN — Medication 3 MILLILITER(S): at 09:33

## 2019-05-27 RX ADMIN — HEPARIN SODIUM 5000 UNIT(S): 5000 INJECTION INTRAVENOUS; SUBCUTANEOUS at 05:57

## 2019-05-27 RX ADMIN — SENNA PLUS 2 TABLET(S): 8.6 TABLET ORAL at 21:21

## 2019-05-27 RX ADMIN — GABAPENTIN 400 MILLIGRAM(S): 400 CAPSULE ORAL at 12:02

## 2019-05-27 RX ADMIN — Medication 25 MILLIGRAM(S): at 05:57

## 2019-05-27 RX ADMIN — Medication 25 MILLIGRAM(S): at 15:04

## 2019-05-27 RX ADMIN — CARVEDILOL PHOSPHATE 6.25 MILLIGRAM(S): 80 CAPSULE, EXTENDED RELEASE ORAL at 18:18

## 2019-05-27 RX ADMIN — Medication 100 MILLIGRAM(S): at 18:18

## 2019-05-27 RX ADMIN — Medication 25 MILLIGRAM(S): at 21:22

## 2019-05-27 RX ADMIN — LOSARTAN POTASSIUM 25 MILLIGRAM(S): 100 TABLET, FILM COATED ORAL at 12:14

## 2019-05-27 RX ADMIN — POLYETHYLENE GLYCOL 3350 17 GRAM(S): 17 POWDER, FOR SOLUTION ORAL at 05:57

## 2019-05-27 RX ADMIN — ATORVASTATIN CALCIUM 80 MILLIGRAM(S): 80 TABLET, FILM COATED ORAL at 21:22

## 2019-05-27 RX ADMIN — CLOPIDOGREL BISULFATE 75 MILLIGRAM(S): 75 TABLET, FILM COATED ORAL at 12:02

## 2019-05-27 RX ADMIN — HEPARIN SODIUM 5000 UNIT(S): 5000 INJECTION INTRAVENOUS; SUBCUTANEOUS at 18:18

## 2019-05-27 RX ADMIN — Medication 81 MILLIGRAM(S): at 12:02

## 2019-05-27 RX ADMIN — PANTOPRAZOLE SODIUM 40 MILLIGRAM(S): 20 TABLET, DELAYED RELEASE ORAL at 12:02

## 2019-05-27 RX ADMIN — LORATADINE 10 MILLIGRAM(S): 10 TABLET ORAL at 12:10

## 2019-05-27 RX ADMIN — Medication 3 MILLILITER(S): at 21:59

## 2019-05-27 RX ADMIN — Medication 200 MILLIGRAM(S): at 21:21

## 2019-05-27 RX ADMIN — Medication 3 MILLILITER(S): at 04:37

## 2019-05-27 RX ADMIN — CARVEDILOL PHOSPHATE 6.25 MILLIGRAM(S): 80 CAPSULE, EXTENDED RELEASE ORAL at 05:57

## 2019-05-27 NOTE — PROGRESS NOTE ADULT - PROBLEM SELECTOR PLAN 1
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 Pt returned to CCU with IABP and TVP.   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 Pt returned to CCU with IABP and TVP.   - continue with DAPT; aspirin and plavix  - c/w ARB  - c/w coreg  - c/w high intensity statin lipitor 80  - repeat echo today, now that IABP is out

## 2019-05-27 NOTE — PROGRESS NOTE ADULT - NSHPATTENDINGPLANDISCUSS_GEN_ALL_CORE
Dr. Rangel Singh, Dr. Sai Becerril, Aracely Multani, NP and Patsy Morgan, NP
Dr. Wai Mccarthy, Dr. Nimco Manrique and Aracely Multani, NP
Dr. Aleida Tim, Dr. Nimco Manrique, Dr. Rangel Singh and Aracely Multani NP

## 2019-05-27 NOTE — DISCHARGE NOTE PROVIDER - PROVIDER TOKENS
PROVIDER:[TOKEN:[[id number]],FOLLOWUP:[1 week]] PROVIDER:[TOKEN:[[id number]],FOLLOWUP:[1 week]]

## 2019-05-27 NOTE — DISCHARGE NOTE PROVIDER - HOSPITAL COURSE
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 Pt reports he passed out yesterday and then again today after getting his ears cleaned out. Per son at bedside, patient never lost consciousness, but rather his "legs gave out beneath him and he crumbled." Pt admits to intermittent chest pressure (6/10, gradual onset, intermittent) and SOB, worsening over the past 3 days. Says he used to be very active and walk a lot, but recently he has only been able to walk around the house and becomes SOB after walking 2-3 blocks. Pt also admits to nausea with an episode of vomiting yesterday. Pt denies any headache, neck pain, back pain, fever, chills, or abd pain. Endorses dry cough that he has had for a week or so prior to presentation, says multiple other family members have had similar cough, and that family member who is a physician prescribed him antibiotics, a (?)z-pack, for it.         Patient reports that he stopped taking his Aspirin and Plavix 1 week ago due to scrotal bleeding from an abrasion. Patient had seen a urologist as an outpatient for this.        In the ED, afebrile, HR 80s, BP 120s/60, satting well on room air.            Hospital Course:    Elevated cardiac enzymes and abnormal EKG concerning for MI. Pt placed on Heparin and taken urgently to cath lab. LIMA to LAD was patent, but saphenous vein graft to OM1 found to have 90% stenosis, s/p 1 GEMMA. IABP placed prophylactically for hemodynamic support, and TVP placed due to junctional rhythm noted in cath lab.    Post-cath, patient's chest pain relieved. Echo performed post-cath limited due to IABP. TVP removed 5/25, without complication. IABP removed 5/26, without complication. Patient's slight MABLE resolved back to baseline sCr by 5/27, and patient placed back on home ARB on 5/27. Repeat echo performed 5/27, once IABP removed, for better visualization.     Symptomatic treatment provided for cough, which is likely 2/2 post-nasal drip.         Patient clinically improved and discharged in stable medical condition. 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 Pt reports he passed out yesterday and then again today after getting his ears cleaned out. Per son at bedside, patient never lost consciousness, but rather his "legs gave out beneath him and he crumbled." Pt admits to intermittent chest pressure (6/10, gradual onset, intermittent) and SOB, worsening over the past 3 days. Says he used to be very active and walk a lot, but recently he has only been able to walk around the house and becomes SOB after walking 2-3 blocks. Pt also admits to nausea with an episode of vomiting yesterday. Pt denies any headache, neck pain, back pain, fever, chills, or abd pain. Endorses dry cough that he has had for a week or so prior to presentation, says multiple other family members have had similar cough, and that family member who is a physician prescribed him antibiotics, a (?)z-pack, for it.         Patient reports that he stopped taking his Aspirin and Plavix 1 week ago due to scrotal bleeding from an abrasion. Patient had seen a urologist as an outpatient for this.        In the ED, afebrile, HR 80s, BP 120s/60, satting well on room air.            Hospital Course:    Elevated cardiac enzymes and abnormal EKG concerning for MI. Pt placed on Heparin and taken urgently to cath lab. LIMA to LAD was patent, but saphenous vein graft to OM1 found to have 90% stenosis, s/p 1 GEMMA. IABP placed prophylactically for hemodynamic support, and TVP placed due to junctional rhythm noted in cath lab.    Post-cath, patient's chest pain relieved. Echo performed post-cath limited due to IABP. TVP removed 5/25, without complication. IABP removed 5/26, without complication. Patient's slight MABLE resolved back to baseline sCr by 5/27, and patient placed back on home ARB on 5/27. Repeat echo performed 5/27, once IABP removed, for better visualization. Echo showing hypokinesis of inferolateral wall, mild diastolic dysfunction, EF 45%-50%. Medical therapy optimized.    Symptomatic treatment provided for cough, which is likely 2/2 post-nasal drip. Cough improving slowly with symptom-directed treatment, Chest PT, incentive spirometry, PT.         Patient clinically improved and discharged in stable medical condition.

## 2019-05-27 NOTE — DISCHARGE NOTE PROVIDER - NSDCFUADDAPPT_GEN_ALL_CORE_FT
Please follow up with your cardiologist, Dr. Corona, within 1 week. The [facility] CCU already contacted Dr. Corona to let him know about your hospitalization. Dr. Corona's office will be able to accommodate an appointment for you within the next 1 - 2 weeks.    If you need to contact the [facility] Dept. of Cardiology, at [address] [place] [address], Suite [facility], [place] [address], you can reach us at the following numbers:  Phone: [phone]   Fax: [phone]

## 2019-05-27 NOTE — PROGRESS NOTE ADULT - PROBLEM SELECTOR PLAN 2
- continue with coreg for now and hold ACE/ARB, consider restarting tomorrow  - pt on lisinopril at home; consider switching to an ARB in setting of cough - continue with coreg for now   - restart ARB (Cr at baseline)

## 2019-05-27 NOTE — PROGRESS NOTE ADULT - PROBLEM SELECTOR PLAN 8
Diet: DASH/carb consistent Diet: DASH/carb consistent   OOB chair  Ambulate  Chest PT  Incentive spirometry  PT

## 2019-05-27 NOTE — DISCHARGE NOTE PROVIDER - NSDCCPCAREPLAN_GEN_ALL_CORE_FT
PRINCIPAL DISCHARGE DIAGNOSIS  Diagnosis: Acute myocardial infarction, unspecified MI type, unspecified artery  Assessment and Plan of Treatment: You came to the hospital with chest pain and shortness of breath with exertion, after you stopped taking your aspirin and Plavix for a week due to scrotal bleeding. In the Emergency Room, you were found to be having a heart attack. One of your grafted vessels was blocked. You were taken urgently to the cardiac catheterization lab and a stent was placed to open up the blocked vessel. Your symptoms improved.   Please continue to take your medications as prescribed. Please follow a low sodium, low fat diet. Please follow up with your cardiologist within 1 week of discharge.  If you experience chest pain or pressure, palpitations, worsening shortness of breath or inability to lie flat without feeling short of breath, please contact your doctor immediately and seek immediate medical attention.   If you experience any bleeding that is not easily stopped while you are on blood thinners, please contact your doctor. PRINCIPAL DISCHARGE DIAGNOSIS  Diagnosis: Acute myocardial infarction, unspecified MI type, unspecified artery  Assessment and Plan of Treatment: You came to the hospital with chest pain and shortness of breath with exertion, after you stopped taking your aspirin and Plavix for a week due to scrotal bleeding. In the Emergency Room, you were found to be having a heart attack. One of your grafted vessels was blocked. You were taken urgently to the cardiac catheterization lab and a stent was placed to open up the blocked vessel. Your symptoms improved.   .  Please continue to take your medications as prescribed. Please follow a low sodium, low fat diet. Please follow up with your cardiologist, Dr. Corona, within 1 week of discharge. We already called Dr. Corona to let him know you were here in the CCU at [facility], and to request a follow up appointment for you. He will be able to accomodate an appointment for you within the next 1 - 2 weeks. Please call [phone] to confirm a follow up appointment with Dr. Corona.  .  If you experience chest pain or pressure, palpitations, worsening shortness of breath or inability to lie flat without feeling short of breath, please contact your doctor immediately and seek immediate medical attention.   .  If you experience any bleeding that is not easily stopped while you are on blood thinners, please contact your doctor.

## 2019-05-27 NOTE — PROGRESS NOTE ADULT - ATTENDING COMMENTS
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 He presented with acute STEMI. Found to have 90% stenosis of saphenous vein graft to OM1, and is now s/p 1 stent. LVEDP was 29 mm Hg. He returned to CCU with IABP and TVP. Given recent upper respiratory infection in members of the family, there is concern that his cough is due to viral URI. Standing regimen: EC aspirin 81 mg daily, carvedilol 6.25 mg every 12 hours, clopidogrel  (Plavix) 75 mg daily, heparin 1000 Units/Hr IV, albuterol/ipratropium for nebulization 3 mL Nebulizer every 6 hours as needed, benzonatate 100 mg every 8 hours as needed for cough, guaifenesin syrup 200 mg oral every 6 hours as needed for cough, hydrocodone/ homatropine syrup 5 mL orally every 6 hours as needed for cough, loratadine 10 mg daily, gabapentin 400 mg daily, atorvastatin 80 mg daily at bedtime, insulin glargine Injectable (Lantus) 20 Units subcutaneously at bedtime, insulin lispro (Humalog) corrective regimen sliding scale three times a day before meals and at bedtime

## 2019-05-27 NOTE — PROGRESS NOTE ADULT - SUBJECTIVE AND OBJECTIVE BOX
PATIENT:  SHAIK CHARANJIT  [id number]    CHIEF COMPLAINT:  Patient is a 85y old  Male who presents with a chief complaint of chest pain (26 May 2019 07:58)      INTERVAL HISTORYOVERNIGHT EVENTS:          MEDICATIONS:  MEDICATIONS  (STANDING):  ALBUTerol/ipratropium for Nebulization 3 milliLiter(s) Nebulizer every 4 hours  aspirin enteric coated 81 milliGRAM(s) Oral daily  atorvastatin 80 milliGRAM(s) Oral at bedtime  carvedilol 6.25 milliGRAM(s) Oral every 12 hours  chlorhexidine 4% Liquid 1 Application(s) Topical <User Schedule>  clopidogrel Tablet 75 milliGRAM(s) Oral daily  docusate sodium Liquid 100 milliGRAM(s) Oral two times a day  fluticasone propionate 50 MICROgram(s)/spray Nasal Spray 1 Spray(s) Both Nostrils two times a day  gabapentin 400 milliGRAM(s) Oral daily  heparin  Injectable 5000 Unit(s) SubCutaneous every 12 hours  hydrALAZINE 25 milliGRAM(s) Oral every 8 hours  insulin glargine Injectable (LANTUS) 20 Unit(s) SubCutaneous at bedtime  insulin lispro (HumaLOG) corrective regimen sliding scale   SubCutaneous three times a day before meals  insulin lispro (HumaLOG) corrective regimen sliding scale   SubCutaneous at bedtime  loratadine 10 milliGRAM(s) Oral daily  melatonin 3 milliGRAM(s) Oral at bedtime  pantoprazole    Tablet 40 milliGRAM(s) Oral before breakfast  senna 2 Tablet(s) Oral at bedtime  sodium chloride 3%  Inhalation 3 milliLiter(s) Inhalation two times a day    MEDICATIONS  (PRN):  benzocaine 15 mG/menthol 3.6 mG Lozenge 1 Lozenge Oral every 3 hours PRN Sore Throat  benzonatate 100 milliGRAM(s) Oral every 8 hours PRN Cough  glucagon  Injectable 1 milliGRAM(s) IntraMuscular once PRN Glucose LESS THAN 70 milligrams/deciliter  guaiFENesin    Syrup 200 milliGRAM(s) Oral every 6 hours PRN Cough  HYDROcodone/homatropine Syrup 5 milliLiter(s) Oral every 6 hours PRN Cough  polyethylene glycol 3350 17 Gram(s) Oral daily PRN Constipation      ALLERGIES:  Allergies    carbamazepine (Other)  Cipro (Unknown)  fluoroquinolone antibiotics (Other)  Tegretol (Unknown)    Intolerances        OBJECTIVE:  ICU Vital Signs Last 24 Hrs  T(C): 36.8 (27 May 2019 08:00), Max: 36.9 (26 May 2019 16:36)  T(F): 98.2 (27 May 2019 08:00), Max: 98.4 (26 May 2019 16:36)  HR: 76 (27 May 2019 09:00) (75 - 94)  BP: 116/52 (27 May 2019 09:00) (96/56 - 142/63)  BP(mean): 68 (27 May 2019 09:00) (64 - 84)  ABP: --  ABP(mean): --  RR: 18 (27 May 2019 09:00) (11 - 30)  SpO2: 97% (27 May 2019 09:00) (94% - 100%)      Adult Advanced Hemodynamics Last 24 Hrs  CVP(mm Hg): --  CVP(cm H2O): --  CO: --  CI: --  PA: --  PA(mean): --  PCWP: --  SVR: --  SVRI: --  PVR: --  PVRI: --  CAPILLARY BLOOD GLUCOSE      POCT Blood Glucose.: 88 mg/dL (27 May 2019 07:57)  POCT Blood Glucose.: 112 mg/dL (26 May 2019 21:24)  POCT Blood Glucose.: 108 mg/dL (26 May 2019 17:42)  POCT Blood Glucose.: 130 mg/dL (26 May 2019 11:43)    CAPILLARY BLOOD GLUCOSE      POCT Blood Glucose.: 88 mg/dL (27 May 2019 07:57)    I&O's Summary    26 May 2019 07:01  -  27 May 2019 07:00  --------------------------------------------------------  IN: 840 mL / OUT: 3185 mL / NET: -2345 mL      Daily     Daily Weight in k (27 May 2019 04:56)    PHYSICAL EXAMINATION:  General: WN/WD NAD  HEENT: PERRLA, EOMI, moist mucous membranes  Neurology: A&Ox3, nonfocal, VELADRE x 4  Respiratory: CTA B/L, normal respiratory effort, no wheezes, crackles, rales  CV: RRR, S1S2, no murmurs, rubs or gallops  Abdominal: Soft, NT, ND +BS, Last BM  Extremities: No edema, + peripheral pulses  Incisions:   Tubes:    LABS:                          12.3   10.97 )-----------( 248      ( 27 May 2019 04:25 )             37.7         132<L>  |  98  |  30<H>  ----------------------------<  141<H>  4.3   |  24  |  1.57<H>    Ca    8.8      27 May 2019 04:25  Phos  3.7       Mg     2.3         TPro  7.3  /  Alb  3.5  /  TBili  0.4  /  DBili  x   /  AST    /  ALT  26  /  AlkPhos  62      LIVER FUNCTIONS - ( 27 May 2019 04:25 )  Alb: 3.5 g/dL / Pro: 7.3 g/dL / ALK PHOS: 62 u/L / ALT: 26 u/L / AST: 27 u/L / GGT: x           PTT - ( 26 May 2019 05:19 )  PTT:30.6 SEC  Creatine Kinase, Serum: 133 u/L ( @ 04:25)  CKMB: 3.68 ng/mL ( @ 04:25)  CKMB Relative Index: Test not performed ( @ 04:25)    CARDIAC MARKERS ( 27 May 2019 04:25 )  x     / x     / 133 u/L / 3.68 ng/mL / x      CARDIAC MARKERS ( 26 May 2019 05:19 )  x     / x     / 230 u/L / 4.46 ng/mL / x              TELEMETRY:     EKG:     IMAGING: PATIENT:  SHAIK CHARANJIT  [id number]    CHIEF COMPLAINT:  Patient is a 85y old  Male who presents with a chief complaint of chest pain (26 May 2019 07:58)      INTERVAL HISTORYOVERNIGHT EVENTS:  Yesterday, IABP removed and TVP removed.  No acute events overnight.  This morning, patient feels well. Only complaint is persistent dry cough.        MEDICATIONS:  MEDICATIONS  (STANDING):  ALBUTerol/ipratropium for Nebulization 3 milliLiter(s) Nebulizer every 4 hours  aspirin enteric coated 81 milliGRAM(s) Oral daily  atorvastatin 80 milliGRAM(s) Oral at bedtime  carvedilol 6.25 milliGRAM(s) Oral every 12 hours  chlorhexidine 4% Liquid 1 Application(s) Topical <User Schedule>  clopidogrel Tablet 75 milliGRAM(s) Oral daily  docusate sodium Liquid 100 milliGRAM(s) Oral two times a day  fluticasone propionate 50 MICROgram(s)/spray Nasal Spray 1 Spray(s) Both Nostrils two times a day  gabapentin 400 milliGRAM(s) Oral daily  heparin  Injectable 5000 Unit(s) SubCutaneous every 12 hours  hydrALAZINE 25 milliGRAM(s) Oral every 8 hours  insulin glargine Injectable (LANTUS) 20 Unit(s) SubCutaneous at bedtime  insulin lispro (HumaLOG) corrective regimen sliding scale   SubCutaneous three times a day before meals  insulin lispro (HumaLOG) corrective regimen sliding scale   SubCutaneous at bedtime  loratadine 10 milliGRAM(s) Oral daily  melatonin 3 milliGRAM(s) Oral at bedtime  pantoprazole    Tablet 40 milliGRAM(s) Oral before breakfast  senna 2 Tablet(s) Oral at bedtime  sodium chloride 3%  Inhalation 3 milliLiter(s) Inhalation two times a day    MEDICATIONS  (PRN):  benzocaine 15 mG/menthol 3.6 mG Lozenge 1 Lozenge Oral every 3 hours PRN Sore Throat  benzonatate 100 milliGRAM(s) Oral every 8 hours PRN Cough  glucagon  Injectable 1 milliGRAM(s) IntraMuscular once PRN Glucose LESS THAN 70 milligrams/deciliter  guaiFENesin    Syrup 200 milliGRAM(s) Oral every 6 hours PRN Cough  HYDROcodone/homatropine Syrup 5 milliLiter(s) Oral every 6 hours PRN Cough  polyethylene glycol 3350 17 Gram(s) Oral daily PRN Constipation      ALLERGIES:  Allergies    carbamazepine (Other)  Cipro (Unknown)  fluoroquinolone antibiotics (Other)  Tegretol (Unknown)    Intolerances        OBJECTIVE:  ICU Vital Signs Last 24 Hrs  T(C): 36.8 (27 May 2019 08:00), Max: 36.9 (26 May 2019 16:36)  T(F): 98.2 (27 May 2019 08:00), Max: 98.4 (26 May 2019 16:36)  HR: 76 (27 May 2019 09:00) (75 - 94)  BP: 116/52 (27 May 2019 09:00) (96/56 - 142/63)  BP(mean): 68 (27 May 2019 09:00) (64 - 84)  ABP: --  ABP(mean): --  RR: 18 (27 May 2019 09:00) (11 - 30)  SpO2: 97% (27 May 2019 09:00) (94% - 100%)      CAPILLARY BLOOD GLUCOSE      POCT Blood Glucose.: 88 mg/dL (27 May 2019 07:57)  POCT Blood Glucose.: 112 mg/dL (26 May 2019 21:24)  POCT Blood Glucose.: 108 mg/dL (26 May 2019 17:42)  POCT Blood Glucose.: 130 mg/dL (26 May 2019 11:43)    CAPILLARY BLOOD GLUCOSE      POCT Blood Glucose.: 88 mg/dL (27 May 2019 07:57)    I&O's Summary    26 May 2019 07:01  -  27 May 2019 07:00  --------------------------------------------------------  IN: 840 mL / OUT: 3185 mL / NET: -2345 mL      Daily     Daily Weight in k (27 May 2019 04:56)    PHYSICAL EXAMINATION:  General: NAD. Lying in bed, smiling  HEENT: PERRLA, EOMI, normal oral mucosa  Neurology: A&Ox3, nonfocal, VELARDE x 4  Respiratory: CTA B/L, normal respiratory effort. no wheezes, crackles, rhonchi  CV: RRR, S1S2, no murmurs, rubs or gallops  Abdominal: Soft, NT, ND +BS  Extremities: No edema, + peripheral pulses      LABS:                          12.3   10.97 )-----------( 248      ( 27 May 2019 04:25 )             37.7         132<L>  |  98  |  30<H>  ----------------------------<  141<H>  4.3   |  24  |  1.57<H>    Ca    8.8      27 May 2019 04:25  Phos  3.7       Mg     2.3         TPro  7.3  /  Alb  3.5  /  TBili  0.4  /  DBili  x   /  AST  27  /  ALT  26  /  AlkPhos  62      LIVER FUNCTIONS - ( 27 May 2019 04:25 )  Alb: 3.5 g/dL / Pro: 7.3 g/dL / ALK PHOS: 62 u/L / ALT: 26 u/L / AST: 27 u/L / GGT: x           PTT - ( 26 May 2019 05:19 )  PTT:30.6 SEC  Creatine Kinase, Serum: 133 u/L ( @ 04:25)  CKMB: 3.68 ng/mL ( @ 04:25)  CKMB Relative Index: Test not performed ( @ 04:25)    CARDIAC MARKERS ( 27 May 2019 04:25 )  x     / x     / 133 u/L / 3.68 ng/mL / x      CARDIAC MARKERS ( 26 May 2019 05:19 )  x     / x     / 230 u/L / 4.46 ng/mL / x              TELEMETRY: reviewed    EKG: reviewed    IMAGING:    EXAM:  XR CHEST PORTABLE ROUTINE 1V        PROCEDURE DATE:  May 26 2019         INTERPRETATION:  CLINICAL INDICATION: Balloon pump.    Frontal view of the chest is obtained.    Comparison is made with May 25, 2019.    IMPRESSION: The patient is status post sternotomy. The heart size appears   enlarged. There is an IABP within the proximal the descending thoracic   aorta as on the prior study.    The lungs are clear. There is no pneumothorax.

## 2019-05-27 NOTE — DISCHARGE NOTE PROVIDER - CARE PROVIDER_API CALL
Zenia Rubin)  Cardiovascular Disease  [facility], [address] [place] [address] [id number] [place] [id number]  Phone: [phone]  Fax: [phone]  Follow Up Time: 1 week Jerry Corona)  Cardiology  [address], [place] [address]  Phone: [phone]  Fax: [phone]  Follow Up Time: 1 week Jerry Corona)  Cardiology  [address] [facility], [place] [address]  Phone: [phone]  Fax: [phone]  Follow Up Time: 1 week

## 2019-05-28 LAB
ANION GAP SERPL CALC-SCNC: 14 MMO/L — SIGNIFICANT CHANGE UP (ref 7–14)
BUN SERPL-MCNC: 34 MG/DL — HIGH (ref 7–23)
CALCIUM SERPL-MCNC: 9.1 MG/DL — SIGNIFICANT CHANGE UP (ref 8.4–10.5)
CHLORIDE SERPL-SCNC: 99 MMOL/L — SIGNIFICANT CHANGE UP (ref 98–107)
CO2 SERPL-SCNC: 22 MMOL/L — SIGNIFICANT CHANGE UP (ref 22–31)
CREAT SERPL-MCNC: 1.74 MG/DL — HIGH (ref 0.5–1.3)
GLUCOSE BLDC GLUCOMTR-MCNC: 102 MG/DL — HIGH (ref 70–99)
GLUCOSE BLDC GLUCOMTR-MCNC: 157 MG/DL — HIGH (ref 70–99)
GLUCOSE BLDC GLUCOMTR-MCNC: 191 MG/DL — HIGH (ref 70–99)
GLUCOSE BLDC GLUCOMTR-MCNC: 203 MG/DL — HIGH (ref 70–99)
GLUCOSE SERPL-MCNC: 117 MG/DL — HIGH (ref 70–99)
HCT VFR BLD CALC: 37 % — LOW (ref 39–50)
HGB BLD-MCNC: 12.2 G/DL — LOW (ref 13–17)
MAGNESIUM SERPL-MCNC: 2.2 MG/DL — SIGNIFICANT CHANGE UP (ref 1.6–2.6)
MCHC RBC-ENTMCNC: 29.6 PG — SIGNIFICANT CHANGE UP (ref 27–34)
MCHC RBC-ENTMCNC: 33 % — SIGNIFICANT CHANGE UP (ref 32–36)
MCV RBC AUTO: 89.8 FL — SIGNIFICANT CHANGE UP (ref 80–100)
NRBC # FLD: 0 K/UL — SIGNIFICANT CHANGE UP (ref 0–0)
PHOSPHATE SERPL-MCNC: 3.8 MG/DL — SIGNIFICANT CHANGE UP (ref 2.5–4.5)
PLATELET # BLD AUTO: 270 K/UL — SIGNIFICANT CHANGE UP (ref 150–400)
PMV BLD: 9.9 FL — SIGNIFICANT CHANGE UP (ref 7–13)
POTASSIUM SERPL-MCNC: 4.3 MMOL/L — SIGNIFICANT CHANGE UP (ref 3.5–5.3)
POTASSIUM SERPL-SCNC: 4.3 MMOL/L — SIGNIFICANT CHANGE UP (ref 3.5–5.3)
RBC # BLD: 4.12 M/UL — LOW (ref 4.2–5.8)
RBC # FLD: 13.8 % — SIGNIFICANT CHANGE UP (ref 10.3–14.5)
SODIUM SERPL-SCNC: 135 MMOL/L — SIGNIFICANT CHANGE UP (ref 135–145)
WBC # BLD: 10.4 K/UL — SIGNIFICANT CHANGE UP (ref 3.8–10.5)
WBC # FLD AUTO: 10.4 K/UL — SIGNIFICANT CHANGE UP (ref 3.8–10.5)

## 2019-05-28 PROCEDURE — 76857 US EXAM PELVIC LIMITED: CPT | Mod: 26

## 2019-05-28 PROCEDURE — 99233 SBSQ HOSP IP/OBS HIGH 50: CPT

## 2019-05-28 RX ORDER — FUROSEMIDE 40 MG
20 TABLET ORAL ONCE
Refills: 0 | Status: COMPLETED | OUTPATIENT
Start: 2019-05-28 | End: 2019-05-28

## 2019-05-28 RX ORDER — POLYETHYLENE GLYCOL 3350 17 G/17G
17 POWDER, FOR SOLUTION ORAL
Refills: 0 | Status: DISCONTINUED | OUTPATIENT
Start: 2019-05-28 | End: 2019-05-29

## 2019-05-28 RX ADMIN — Medication 1: at 12:01

## 2019-05-28 RX ADMIN — Medication 3 MILLILITER(S): at 12:23

## 2019-05-28 RX ADMIN — Medication 3 MILLIGRAM(S): at 21:44

## 2019-05-28 RX ADMIN — Medication 3 MILLILITER(S): at 00:38

## 2019-05-28 RX ADMIN — Medication 1 SPRAY(S): at 17:52

## 2019-05-28 RX ADMIN — GABAPENTIN 400 MILLIGRAM(S): 400 CAPSULE ORAL at 11:18

## 2019-05-28 RX ADMIN — SODIUM CHLORIDE 3 MILLILITER(S): 9 INJECTION INTRAMUSCULAR; INTRAVENOUS; SUBCUTANEOUS at 21:16

## 2019-05-28 RX ADMIN — ATORVASTATIN CALCIUM 80 MILLIGRAM(S): 80 TABLET, FILM COATED ORAL at 21:44

## 2019-05-28 RX ADMIN — HEPARIN SODIUM 5000 UNIT(S): 5000 INJECTION INTRAVENOUS; SUBCUTANEOUS at 17:51

## 2019-05-28 RX ADMIN — INSULIN GLARGINE 20 UNIT(S): 100 INJECTION, SOLUTION SUBCUTANEOUS at 21:44

## 2019-05-28 RX ADMIN — Medication 3 MILLILITER(S): at 08:01

## 2019-05-28 RX ADMIN — CLOPIDOGREL BISULFATE 75 MILLIGRAM(S): 75 TABLET, FILM COATED ORAL at 11:18

## 2019-05-28 RX ADMIN — CARVEDILOL PHOSPHATE 6.25 MILLIGRAM(S): 80 CAPSULE, EXTENDED RELEASE ORAL at 17:52

## 2019-05-28 RX ADMIN — Medication 20 MILLIGRAM(S): at 11:18

## 2019-05-28 RX ADMIN — Medication 3 MILLILITER(S): at 05:56

## 2019-05-28 RX ADMIN — POLYETHYLENE GLYCOL 3350 17 GRAM(S): 17 POWDER, FOR SOLUTION ORAL at 17:51

## 2019-05-28 RX ADMIN — CHLORHEXIDINE GLUCONATE 1 APPLICATION(S): 213 SOLUTION TOPICAL at 07:34

## 2019-05-28 RX ADMIN — Medication 1 SPRAY(S): at 06:09

## 2019-05-28 RX ADMIN — PANTOPRAZOLE SODIUM 40 MILLIGRAM(S): 20 TABLET, DELAYED RELEASE ORAL at 07:34

## 2019-05-28 RX ADMIN — Medication 81 MILLIGRAM(S): at 11:18

## 2019-05-28 RX ADMIN — SODIUM CHLORIDE 3 MILLILITER(S): 9 INJECTION INTRAMUSCULAR; INTRAVENOUS; SUBCUTANEOUS at 08:02

## 2019-05-28 RX ADMIN — Medication 3 MILLILITER(S): at 16:04

## 2019-05-28 RX ADMIN — Medication 100 MILLIGRAM(S): at 17:51

## 2019-05-28 RX ADMIN — SENNA PLUS 2 TABLET(S): 8.6 TABLET ORAL at 21:44

## 2019-05-28 RX ADMIN — Medication 2: at 16:46

## 2019-05-28 RX ADMIN — Medication 3 MILLILITER(S): at 21:11

## 2019-05-28 RX ADMIN — LORATADINE 10 MILLIGRAM(S): 10 TABLET ORAL at 11:18

## 2019-05-28 RX ADMIN — CARVEDILOL PHOSPHATE 6.25 MILLIGRAM(S): 80 CAPSULE, EXTENDED RELEASE ORAL at 06:08

## 2019-05-28 RX ADMIN — Medication 100 MILLIGRAM(S): at 06:07

## 2019-05-28 RX ADMIN — HEPARIN SODIUM 5000 UNIT(S): 5000 INJECTION INTRAVENOUS; SUBCUTANEOUS at 06:08

## 2019-05-28 RX ADMIN — LOSARTAN POTASSIUM 25 MILLIGRAM(S): 100 TABLET, FILM COATED ORAL at 06:08

## 2019-05-28 RX ADMIN — Medication 200 MILLIGRAM(S): at 06:08

## 2019-05-28 NOTE — PROGRESS NOTE ADULT - SUBJECTIVE AND OBJECTIVE BOX
PATIENT:  SHAIK CHARANJIT  [id number]    CHIEF COMPLAINT:  Patient is a 85y old  Male who presents with a chief complaint of chest pain (26 May 2019 07:58)      INTERVAL HISTORYOVERNIGHT EVENTS:  No acute events overnight. Patient says his cough is somewhat improved.   Denies chest pain, SOB, abd pain, n/v, bleeding.  Says he hasn't yet had a bowel movement.        MEDICATIONS:  MEDICATIONS  (STANDING):  ALBUTerol/ipratropium for Nebulization 3 milliLiter(s) Nebulizer every 4 hours  aspirin enteric coated 81 milliGRAM(s) Oral daily  atorvastatin 80 milliGRAM(s) Oral at bedtime  carvedilol 6.25 milliGRAM(s) Oral every 12 hours  chlorhexidine 4% Liquid 1 Application(s) Topical <User Schedule>  clopidogrel Tablet 75 milliGRAM(s) Oral daily  docusate sodium Liquid 100 milliGRAM(s) Oral two times a day  fluticasone propionate 50 MICROgram(s)/spray Nasal Spray 1 Spray(s) Both Nostrils two times a day  gabapentin 400 milliGRAM(s) Oral daily  heparin  Injectable 5000 Unit(s) SubCutaneous every 12 hours  insulin glargine Injectable (LANTUS) 20 Unit(s) SubCutaneous at bedtime  insulin lispro (HumaLOG) corrective regimen sliding scale   SubCutaneous three times a day before meals  insulin lispro (HumaLOG) corrective regimen sliding scale   SubCutaneous at bedtime  loratadine 10 milliGRAM(s) Oral daily  losartan 25 milliGRAM(s) Oral daily  melatonin 3 milliGRAM(s) Oral at bedtime  pantoprazole    Tablet 40 milliGRAM(s) Oral before breakfast  senna 2 Tablet(s) Oral at bedtime  sodium chloride 3%  Inhalation 3 milliLiter(s) Inhalation two times a day    MEDICATIONS  (PRN):  benzocaine 15 mG/menthol 3.6 mG Lozenge 1 Lozenge Oral every 3 hours PRN Sore Throat  benzonatate 100 milliGRAM(s) Oral every 8 hours PRN Cough  glucagon  Injectable 1 milliGRAM(s) IntraMuscular once PRN Glucose LESS THAN 70 milligrams/deciliter  guaiFENesin    Syrup 200 milliGRAM(s) Oral every 6 hours PRN Cough  HYDROcodone/homatropine Syrup 5 milliLiter(s) Oral every 6 hours PRN Cough  polyethylene glycol 3350 17 Gram(s) Oral daily PRN Constipation      ALLERGIES:  Allergies    carbamazepine (Other)  Cipro (Unknown)  fluoroquinolone antibiotics (Other)  Tegretol (Unknown)    Intolerances        OBJECTIVE:  ICU Vital Signs Last 24 Hrs  T(C): 36.6 (28 May 2019 04:00), Max: 37 (27 May 2019 12:00)  T(F): 97.9 (28 May 2019 04:00), Max: 98.6 (27 May 2019 12:00)  HR: 70 (28 May 2019 08:01) (66 - 98)  BP: 120/53 (28 May 2019 07:00) (84/60 - 127/59)  BP(mean): 71 (28 May 2019 07:00) (54 - 78)  ABP: --  ABP(mean): --  RR: 12 (28 May 2019 07:00) (11 - 25)  SpO2: 98% (28 May 2019 08:01) (93% - 100%)      Adult Advanced Hemodynamics Last 24 Hrs    CAPILLARY BLOOD GLUCOSE    POCT Blood Glucose.: 102 mg/dL (28 May 2019 07:24)  POCT Blood Glucose.: 215 mg/dL (27 May 2019 21:09)  POCT Blood Glucose.: 148 mg/dL (27 May 2019 16:42)  POCT Blood Glucose.: 93 mg/dL (27 May 2019 12:08)    CAPILLARY BLOOD GLUCOSE    POCT Blood Glucose.: 102 mg/dL (28 May 2019 07:24)    I&O's Summary    27 May 2019 07:01  -  28 May 2019 07:00  --------------------------------------------------------  IN: 840 mL / OUT: 1825 mL / NET: -985 mL      Daily     Daily     PHYSICAL EXAMINATION:  General: NAD. Lying in bed  HEENT: PERRLA, EOMI, normal oral mucosa  Neurology: A&Ox3, nonfocal, VELARDE x 4  Respiratory: Mild wheezes at lung bases bilaterally after coughing  CV: RRR, S1S2, no murmurs, rubs or gallops  Abdominal: Soft, NT, mildly distended, +BS  Extremities: No edema, + peripheral pulses    LABS:                          12.2   10.40 )-----------( 270      ( 28 May 2019 06:30 )             37.0     05-28    135  |  99  |  34<H>  ----------------------------<  117<H>  4.3   |  22  |  1.74<H>    Ca    9.1      28 May 2019 06:30  Phos  3.8     05-28  Mg     2.2     05-28    TPro  7.3  /  Alb  3.5  /  TBili  0.4  /  DBili  x   /  AST  27  /  ALT  26  /  AlkPhos  62  05-27    LIVER FUNCTIONS - ( 27 May 2019 04:25 )  Alb: 3.5 g/dL / Pro: 7.3 g/dL / ALK PHOS: 62 u/L / ALT: 26 u/L / AST: 27 u/L / GGT: x               CARDIAC MARKERS ( 27 May 2019 04:25 )  x     / x     / 133 u/L / 3.68 ng/mL / x              TELEMETRY: reviewed    EKG: reviewed    IMAGING:     PROCEDURE: Transthoracic echocardiogram with 2-D, M-Mode  and complete spectral and color flow Doppler.  INDICATION: Dyspnea, unspecified (R06.00)  ------------------------------------------------------------------------  DIMENSIONS:  Dimensions:     Normal Values:  LA:     4.2 cm    2.0 - 4.0 cm  Ao:     3.5 cm    2.0 - 3.8 cm  SEPTUM: 0.6 cm    0.6 - 1.2 cm  PWT:    1.0 cm    0.6 - 1.1 cm  LVIDd:  4.8 cm    3.0 - 5.6 cm  LVIDs:    ---     1.8 - 4.0 cm  Derived Variables:  LVMI: 68 g/m2  RWT: 0.41  Ejection Fraction (Visual Estimate): 45-50 %  ------------------------------------------------------------------------  OBSERVATIONS:  Mitral Valve: Mitral annular calcification and calcified  mitral leaflets with normal diastolic opening. Mild mitral  regurgitation.  Aortic Root: Normal aortic root.  Aortic Valve: Calcified trileaflet aortic valve with normal  opening.  Left Atrium: Mildly dilated left atrium.  LA volume index =  36 cc/m2.  Left Ventricle: Endocardium not well visualized;  hypokinesis of the inferolateral wall.   Normal left  ventricular internal dimensions and wall thicknesses. Mild  diastolic dysfunction (Stage I).  Right Heart: Normal right atrium. Normal right ventricular  size and function. Normal tricuspid valve. Normal pulmonic  valve.  Pericardium/PleuraNormal pericardium with no pericardial  effusion.  ------------------------------------------------------------------------  CONCLUSIONS:  1. Normal left ventricular internal dimensions and wall  thicknesses.  2. Endocardium not well visualized; hypokinesis of the  inferolateral wall.  3. Mild diastolic dysfunction (Stage I).

## 2019-05-28 NOTE — PHYSICAL THERAPY INITIAL EVALUATION ADULT - PERTINENT HX OF CURRENT PROBLEM, REHAB EVAL
admitted from home with syncope episode.  Pt. with past cardiac hx. of CABG X 4, s/p Stent placement, CVAn

## 2019-05-28 NOTE — PROGRESS NOTE ADULT - PROBLEM SELECTOR PLAN 1
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 Pt returned to CCU with IABP and TVP.   - continue with DAPT; aspirin and plavix  - c/w ARB  - c/w coreg  - c/w high intensity statin lipitor 80  - repeat echo w/ hypokinesis of inferolateral wall & EF 45-50%

## 2019-05-28 NOTE — PROGRESS NOTE ADULT - PROBLEM SELECTOR PLAN 7
Patient reports that he's "had this cough for a while". Family member reports "everyone in the family has had a cough". Pt reports that family member who is a physician prescribed him a abx last week.   CXR clear.  RVP negative.  No fever, no white count.   2/2 post-nasal drip.  Symptomatic treatment.  - c/w tessalon perle  - c/w cepacol lozenge  - c/w flonase  - c/w robitussin  - c/w hycodan   - c/w duonebs q4hr  - c/w hypertonic saline

## 2019-05-28 NOTE — PROGRESS NOTE ADULT - ATTENDING COMMENTS
Patient seen and examined  Agree with above assessment and plan  Cont current meds  Now OOB and encourage ambulation  DC planning

## 2019-05-29 ENCOUNTER — TRANSCRIPTION ENCOUNTER (OUTPATIENT)
Age: 84
End: 2019-05-29

## 2019-05-29 VITALS — OXYGEN SATURATION: 99 %

## 2019-05-29 LAB
ANION GAP SERPL CALC-SCNC: 11 MMO/L — SIGNIFICANT CHANGE UP (ref 7–14)
BUN SERPL-MCNC: 30 MG/DL — HIGH (ref 7–23)
CALCIUM SERPL-MCNC: 8.5 MG/DL — SIGNIFICANT CHANGE UP (ref 8.4–10.5)
CHLORIDE SERPL-SCNC: 101 MMOL/L — SIGNIFICANT CHANGE UP (ref 98–107)
CO2 SERPL-SCNC: 23 MMOL/L — SIGNIFICANT CHANGE UP (ref 22–31)
CREAT SERPL-MCNC: 1.52 MG/DL — HIGH (ref 0.5–1.3)
GLUCOSE BLDC GLUCOMTR-MCNC: 196 MG/DL — HIGH (ref 70–99)
GLUCOSE BLDC GLUCOMTR-MCNC: 216 MG/DL — HIGH (ref 70–99)
GLUCOSE SERPL-MCNC: 145 MG/DL — HIGH (ref 70–99)
MAGNESIUM SERPL-MCNC: 2.2 MG/DL — SIGNIFICANT CHANGE UP (ref 1.6–2.6)
PHOSPHATE SERPL-MCNC: 3.5 MG/DL — SIGNIFICANT CHANGE UP (ref 2.5–4.5)
POTASSIUM SERPL-MCNC: 4.3 MMOL/L — SIGNIFICANT CHANGE UP (ref 3.5–5.3)
POTASSIUM SERPL-SCNC: 4.3 MMOL/L — SIGNIFICANT CHANGE UP (ref 3.5–5.3)
SODIUM SERPL-SCNC: 135 MMOL/L — SIGNIFICANT CHANGE UP (ref 135–145)

## 2019-05-29 PROCEDURE — 99239 HOSP IP/OBS DSCHRG MGMT >30: CPT

## 2019-05-29 RX ORDER — ATORVASTATIN CALCIUM 80 MG/1
1 TABLET, FILM COATED ORAL
Qty: 30 | Refills: 0
Start: 2019-05-29 | End: 2019-06-27

## 2019-05-29 RX ORDER — CLOPIDOGREL BISULFATE 75 MG/1
1 TABLET, FILM COATED ORAL
Qty: 30 | Refills: 0
Start: 2019-05-29 | End: 2019-06-27

## 2019-05-29 RX ORDER — FUROSEMIDE 40 MG
1 TABLET ORAL
Qty: 30 | Refills: 0
Start: 2019-05-29 | End: 2019-06-27

## 2019-05-29 RX ORDER — CARVEDILOL PHOSPHATE 80 MG/1
1 CAPSULE, EXTENDED RELEASE ORAL
Qty: 60 | Refills: 0
Start: 2019-05-29 | End: 2019-06-27

## 2019-05-29 RX ORDER — LOSARTAN POTASSIUM 100 MG/1
1 TABLET, FILM COATED ORAL
Qty: 30 | Refills: 0
Start: 2019-05-29 | End: 2019-06-27

## 2019-05-29 RX ORDER — FUROSEMIDE 40 MG
20 TABLET ORAL DAILY
Refills: 0 | Status: DISCONTINUED | OUTPATIENT
Start: 2019-05-29 | End: 2019-05-29

## 2019-05-29 RX ADMIN — Medication 20 MILLIGRAM(S): at 11:28

## 2019-05-29 RX ADMIN — Medication 81 MILLIGRAM(S): at 11:28

## 2019-05-29 RX ADMIN — GABAPENTIN 400 MILLIGRAM(S): 400 CAPSULE ORAL at 11:28

## 2019-05-29 RX ADMIN — PANTOPRAZOLE SODIUM 40 MILLIGRAM(S): 20 TABLET, DELAYED RELEASE ORAL at 11:28

## 2019-05-29 RX ADMIN — Medication 1 SPRAY(S): at 05:50

## 2019-05-29 RX ADMIN — LORATADINE 10 MILLIGRAM(S): 10 TABLET ORAL at 11:28

## 2019-05-29 RX ADMIN — CLOPIDOGREL BISULFATE 75 MILLIGRAM(S): 75 TABLET, FILM COATED ORAL at 11:28

## 2019-05-29 RX ADMIN — Medication 3 MILLILITER(S): at 10:23

## 2019-05-29 RX ADMIN — Medication 1: at 08:04

## 2019-05-29 RX ADMIN — Medication 3 MILLILITER(S): at 13:01

## 2019-05-29 RX ADMIN — SODIUM CHLORIDE 3 MILLILITER(S): 9 INJECTION INTRAMUSCULAR; INTRAVENOUS; SUBCUTANEOUS at 10:24

## 2019-05-29 RX ADMIN — Medication 2: at 11:44

## 2019-05-29 RX ADMIN — CARVEDILOL PHOSPHATE 6.25 MILLIGRAM(S): 80 CAPSULE, EXTENDED RELEASE ORAL at 05:49

## 2019-05-29 RX ADMIN — Medication 100 MILLIGRAM(S): at 05:49

## 2019-05-29 RX ADMIN — POLYETHYLENE GLYCOL 3350 17 GRAM(S): 17 POWDER, FOR SOLUTION ORAL at 05:49

## 2019-05-29 RX ADMIN — LOSARTAN POTASSIUM 25 MILLIGRAM(S): 100 TABLET, FILM COATED ORAL at 05:49

## 2019-05-29 RX ADMIN — Medication 3 MILLILITER(S): at 05:23

## 2019-05-29 RX ADMIN — HEPARIN SODIUM 5000 UNIT(S): 5000 INJECTION INTRAVENOUS; SUBCUTANEOUS at 05:49

## 2019-05-29 NOTE — PROGRESS NOTE ADULT - PROBLEM SELECTOR PLAN 4
- on home Lantus 20   - hold home oral antiglycemic agents  - ISS  - adjust insulin as necessary  - trend FSs  -hgba1c elevated to 9.4, diabetic teaching and education needs to be reinforced

## 2019-05-29 NOTE — PROGRESS NOTE ADULT - ATTENDING COMMENTS
Patient seen and examined  Agree with above assessment and plan  Patient with CAD sp PCI  Continue present meds  DC planning

## 2019-05-29 NOTE — PROGRESS NOTE ADULT - ASSESSMENT
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 Found to have 90% stenosis of saphenous vein graft to OM1, s/p 1 stent, s/p IABP placement & TVP placement. Clinically improved.
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 Found to have 90% stenosis of saphenous vein graft to OM1, s/p 1 stent, s/p IABP placement & TVP placement. Clinically improved.
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 Found to have 90% stenosis of saphenous vein graft to OM1, s/p 1 stent, s/p IABP placement & TVP placement. Clinically improved.
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 Found to have 90% stenosis of saphenous vein graft to OM1, s/p 1 stent.
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 Found to have 90% stenosis of saphenous vein graft to OM1, s/p 1 stent, s/p IABP placement & TVP placement. Clinically improved.

## 2019-05-29 NOTE — DISCHARGE NOTE NURSING/CASE MANAGEMENT/SOCIAL WORK - NSDCFUADDAPPT_GEN_ALL_CORE_FT
Please follow up with your cardiologist, Dr. Corona, within 1 week. The [facility] CCU already contacted Dr. Corona to let him know about your hospitalization. Dr. Corona's office will be able to accommodate an appointment for you within the next 1 - 2 weeks.    If you need to contact the [facility] Dept. of Cardiology, at [address] [place] [address], Suite [facility], [place] [address], you can reach us at the following numbers:  Phone: [phone]   Fax: [phone]

## 2019-05-29 NOTE — PROGRESS NOTE ADULT - PROBLEM SELECTOR PLAN 5
Prior stroke in 1998, with residual left facial numbness  - Continue to monitor w/ neuro checks

## 2019-05-29 NOTE — PROGRESS NOTE ADULT - PROBLEM SELECTOR PROBLEM 1
STEMI (ST elevation myocardial infarction)

## 2019-05-29 NOTE — DISCHARGE NOTE NURSING/CASE MANAGEMENT/SOCIAL WORK - NSSCTYPOFSERV_GEN_ALL_CORE
Visiting Nurse and Physical therapist andevaluation for aide Visiting Nurse and Physical therapist and evaluation for aide

## 2019-05-29 NOTE — DISCHARGE NOTE NURSING/CASE MANAGEMENT/SOCIAL WORK - NSSCNAMETXT_GEN_ALL_CORE
[facility] Nurse to visit on the day following discharge. Other appropriate services to be arranged thereafter. Please contact the home care agency at the above phone number if you have not heard from them by approximately 12 noon on the day after your hospital discharge.

## 2019-05-29 NOTE — PROGRESS NOTE ADULT - PROBLEM SELECTOR PLAN 3
- c/w high dose statin  -lipid panel normal

## 2019-05-29 NOTE — DISCHARGE NOTE NURSING/CASE MANAGEMENT/SOCIAL WORK - NSDCPEPTSTRK_GEN_ALL_CORE
Stroke warning signs and symptoms/Need for follow up after discharge/Prescribed medications/Risk factors for stroke/Stroke education booklet/Signs and symptoms of stroke/Call 911 for stroke/Stroke support groups for patients, families, and friends

## 2019-05-29 NOTE — PROGRESS NOTE ADULT - SUBJECTIVE AND OBJECTIVE BOX
PATIENT:  SHAIK CHARANJIT  [id number]    CHIEF COMPLAINT:  Patient is a 85y old  Male who presents with a chief complaint of chest pain (26 May 2019 07:58)      INTERVAL HISTORYOVERNIGHT EVENTS:  No acute events overnight. No cp, no SOB.        MEDICATIONS:  MEDICATIONS  (STANDING):  ALBUTerol/ipratropium for Nebulization 3 milliLiter(s) Nebulizer every 4 hours  aspirin enteric coated 81 milliGRAM(s) Oral daily  atorvastatin 80 milliGRAM(s) Oral at bedtime  carvedilol 6.25 milliGRAM(s) Oral every 12 hours  chlorhexidine 4% Liquid 1 Application(s) Topical <User Schedule>  clopidogrel Tablet 75 milliGRAM(s) Oral daily  docusate sodium Liquid 100 milliGRAM(s) Oral two times a day  fluticasone propionate 50 MICROgram(s)/spray Nasal Spray 1 Spray(s) Both Nostrils two times a day  gabapentin 400 milliGRAM(s) Oral daily  heparin  Injectable 5000 Unit(s) SubCutaneous every 12 hours  insulin glargine Injectable (LANTUS) 20 Unit(s) SubCutaneous at bedtime  insulin lispro (HumaLOG) corrective regimen sliding scale   SubCutaneous three times a day before meals  insulin lispro (HumaLOG) corrective regimen sliding scale   SubCutaneous at bedtime  loratadine 10 milliGRAM(s) Oral daily  losartan 25 milliGRAM(s) Oral daily  melatonin 3 milliGRAM(s) Oral at bedtime  pantoprazole    Tablet 40 milliGRAM(s) Oral before breakfast  polyethylene glycol 3350 17 Gram(s) Oral two times a day  senna 2 Tablet(s) Oral at bedtime  sodium chloride 3%  Inhalation 3 milliLiter(s) Inhalation two times a day    MEDICATIONS  (PRN):  benzocaine 15 mG/menthol 3.6 mG Lozenge 1 Lozenge Oral every 3 hours PRN Sore Throat  benzonatate 100 milliGRAM(s) Oral every 8 hours PRN Cough  glucagon  Injectable 1 milliGRAM(s) IntraMuscular once PRN Glucose LESS THAN 70 milligrams/deciliter  guaiFENesin    Syrup 200 milliGRAM(s) Oral every 6 hours PRN Cough  HYDROcodone/homatropine Syrup 5 milliLiter(s) Oral every 6 hours PRN Cough      ALLERGIES:  Allergies    carbamazepine (Other)  Cipro (Unknown)  fluoroquinolone antibiotics (Other)  Tegretol (Unknown)    Intolerances        OBJECTIVE:  ICU Vital Signs Last 24 Hrs  T(C): 36.6 (29 May 2019 04:00), Max: 36.8 (28 May 2019 13:00)  T(F): 97.9 (29 May 2019 04:00), Max: 98.2 (28 May 2019 13:00)  HR: 75 (29 May 2019 06:00) (68 - 89)  BP: 116/50 (29 May 2019 06:00) (94/61 - 128/55)  BP(mean): 57 (29 May 2019 06:00) (53 - 88)  ABP: --  ABP(mean): --  RR: 15 (29 May 2019 06:00) (11 - 26)  SpO2: 97% (29 May 2019 06:00) (92% - 100%)      CAPILLARY BLOOD GLUCOSE    POCT Blood Glucose.: 191 mg/dL (28 May 2019 21:12)  POCT Blood Glucose.: 203 mg/dL (28 May 2019 16:41)  POCT Blood Glucose.: 157 mg/dL (28 May 2019 11:42)    CAPILLARY BLOOD GLUCOSE    POCT Blood Glucose.: 191 mg/dL (28 May 2019 21:12)    I&O's Summary    28 May 2019 07:01  -  29 May 2019 07:00  --------------------------------------------------------  IN: 0 mL / OUT: 2050 mL / NET: -2050 mL      Daily     Daily Weight in k (29 May 2019 06:00)    PHYSICAL EXAMINATION:  General: NAD. Lying in bed  HEENT: PERRLA, EOMI, normal oral mucosa  Neurology: A&Ox3, nonfocal, VELARDE x 4  Respiratory: Mild wheezes at lung bases bilaterally after coughing  CV: RRR, S1S2, no murmurs, rubs or gallops  Abdominal: Soft, NT, mildly distended, +BS  Extremities: No edema, + peripheral pulses    LABS:                          12.2   10.40 )-----------( 270      ( 28 May 2019 06:30 )             37.0     05-29    135  |  101  |  30<H>  ----------------------------<  145<H>  4.3   |  23  |  1.52<H>    Ca    8.5      29 May 2019 05:00  Phos  3.5       Mg     2.2               TELEMETRY: reviewed    EKG: reviewed PATIENT:  SHAIK CHARANJIT  [id number]    CHIEF COMPLAINT:  Patient is a 85y old  Male who presents with a chief complaint of chest pain (26 May 2019 07:58)      INTERVAL HISTORYOVERNIGHT EVENTS:  No acute events overnight. No cp, no SOB.  Had a BM yesterday, normal. Says he feels better.        MEDICATIONS:  MEDICATIONS  (STANDING):  ALBUTerol/ipratropium for Nebulization 3 milliLiter(s) Nebulizer every 4 hours  aspirin enteric coated 81 milliGRAM(s) Oral daily  atorvastatin 80 milliGRAM(s) Oral at bedtime  carvedilol 6.25 milliGRAM(s) Oral every 12 hours  chlorhexidine 4% Liquid 1 Application(s) Topical <User Schedule>  clopidogrel Tablet 75 milliGRAM(s) Oral daily  docusate sodium Liquid 100 milliGRAM(s) Oral two times a day  fluticasone propionate 50 MICROgram(s)/spray Nasal Spray 1 Spray(s) Both Nostrils two times a day  gabapentin 400 milliGRAM(s) Oral daily  heparin  Injectable 5000 Unit(s) SubCutaneous every 12 hours  insulin glargine Injectable (LANTUS) 20 Unit(s) SubCutaneous at bedtime  insulin lispro (HumaLOG) corrective regimen sliding scale   SubCutaneous three times a day before meals  insulin lispro (HumaLOG) corrective regimen sliding scale   SubCutaneous at bedtime  loratadine 10 milliGRAM(s) Oral daily  losartan 25 milliGRAM(s) Oral daily  melatonin 3 milliGRAM(s) Oral at bedtime  pantoprazole    Tablet 40 milliGRAM(s) Oral before breakfast  polyethylene glycol 3350 17 Gram(s) Oral two times a day  senna 2 Tablet(s) Oral at bedtime  sodium chloride 3%  Inhalation 3 milliLiter(s) Inhalation two times a day    MEDICATIONS  (PRN):  benzocaine 15 mG/menthol 3.6 mG Lozenge 1 Lozenge Oral every 3 hours PRN Sore Throat  benzonatate 100 milliGRAM(s) Oral every 8 hours PRN Cough  glucagon  Injectable 1 milliGRAM(s) IntraMuscular once PRN Glucose LESS THAN 70 milligrams/deciliter  guaiFENesin    Syrup 200 milliGRAM(s) Oral every 6 hours PRN Cough  HYDROcodone/homatropine Syrup 5 milliLiter(s) Oral every 6 hours PRN Cough      ALLERGIES:  Allergies    carbamazepine (Other)  Cipro (Unknown)  fluoroquinolone antibiotics (Other)  Tegretol (Unknown)    Intolerances        OBJECTIVE:  ICU Vital Signs Last 24 Hrs  T(C): 36.6 (29 May 2019 04:00), Max: 36.8 (28 May 2019 13:00)  T(F): 97.9 (29 May 2019 04:00), Max: 98.2 (28 May 2019 13:00)  HR: 75 (29 May 2019 06:00) (68 - 89)  BP: 116/50 (29 May 2019 06:00) (94/61 - 128/55)  BP(mean): 57 (29 May 2019 06:00) (53 - 88)  ABP: --  ABP(mean): --  RR: 15 (29 May 2019 06:00) (11 - 26)  SpO2: 97% (29 May 2019 06:00) (92% - 100%)      CAPILLARY BLOOD GLUCOSE    POCT Blood Glucose.: 191 mg/dL (28 May 2019 21:12)  POCT Blood Glucose.: 203 mg/dL (28 May 2019 16:41)  POCT Blood Glucose.: 157 mg/dL (28 May 2019 11:42)    CAPILLARY BLOOD GLUCOSE    POCT Blood Glucose.: 191 mg/dL (28 May 2019 21:12)    I&O's Summary    28 May 2019 07:01  -  29 May 2019 07:00  --------------------------------------------------------  IN: 0 mL / OUT: 2050 mL / NET: -2050 mL      Daily     Daily Weight in k (29 May 2019 06:00)    PHYSICAL EXAMINATION:  General: NAD. Lying in bed  HEENT: PERRLA, EOMI, normal oral mucosa  Neurology: A&Ox3, nonfocal, VELARDE x 4  Respiratory: Mild wheezes at lung bases bilaterally after coughing  CV: RRR, S1S2, no murmurs, rubs or gallops  Abdominal: Soft, NT, mildly distended, +BS  Extremities: No edema, + peripheral pulses    LABS:                          12.2   10.40 )-----------( 270      ( 28 May 2019 06:30 )             37.0     05-29    135  |  101  |  30<H>  ----------------------------<  145<H>  4.3   |  23  |  1.52<H>    Ca    8.5      29 May 2019 05:00  Phos  3.5       Mg     2.2               TELEMETRY: reviewed    EKG: reviewed    IMAGING:     EXAM:  US PELVIC LIMITED OR FOLLOW UP      PROCEDURE DATE:  May 28 2019     INTERPRETATION:  CLINICAL INFORMATION:   scrotal bleeding, r/o perineal   abscess     TECHNIQUE: US PELVIS LIMITED OR FOLLOW UP.    COMPARISON: None available.    FINDINGS:  Targeted ultrasound of the perineal region was performed for evaluation   of potential underlying abscess. No evidence of abscess is identified.    IMPRESSION:     No evidence of abscess is identifiable in the perineal region.

## 2019-05-29 NOTE — PROGRESS NOTE ADULT - PROBLEM SELECTOR PROBLEM 7
Cough
Prophylactic measure
Cough
impaired balance/Left LE/decreased strength/decreased ROM

## 2019-05-29 NOTE — DISCHARGE NOTE NURSING/CASE MANAGEMENT/SOCIAL WORK - NSDCDPATPORTLINK_GEN_ALL_CORE
You can access the [facility] Patient Portal, offered by [facility], by registering with the following website: [email or web address]

## 2019-05-29 NOTE — PROGRESS NOTE ADULT - PROBLEM SELECTOR PLAN 6
likely 2/2 diabetic neuropathy.  - C/w home gabapentin

## 2019-05-29 NOTE — PROGRESS NOTE ADULT - PROBLEM SELECTOR PLAN 8
Diet: DASH/carb consistent   OOB chair  Ambulate  Chest PT  Incentive spirometry  PT recommending rehab  aggressive bowel regimen of colace, senna, miralax, dulcolax

## 2019-06-01 ENCOUNTER — OUTPATIENT (OUTPATIENT)
Dept: OUTPATIENT SERVICES | Facility: HOSPITAL | Age: 84
LOS: 1 days | End: 2019-06-01
Payer: MEDICARE

## 2019-06-01 DIAGNOSIS — Z95.5 PRESENCE OF CORONARY ANGIOPLASTY IMPLANT AND GRAFT: Chronic | ICD-10-CM

## 2019-06-01 DIAGNOSIS — Z98.89 OTHER SPECIFIED POSTPROCEDURAL STATES: Chronic | ICD-10-CM

## 2019-06-12 DIAGNOSIS — Z71.89 OTHER SPECIFIED COUNSELING: ICD-10-CM

## 2019-06-17 NOTE — PROGRESS NOTE ADULT - PROBLEM SELECTOR PLAN 8
normal... Diet: DASH/carb consistent   OOB chair  Ambulate  Chest PT  Incentive spirometry  PT  aggressive bowel regimen of colace, senna, miralax, dulcolax

## 2019-07-06 ENCOUNTER — INPATIENT (INPATIENT)
Facility: HOSPITAL | Age: 84
LOS: 1 days | Discharge: HOME | End: 2019-07-08
Attending: INTERNAL MEDICINE | Admitting: INTERNAL MEDICINE
Payer: MEDICARE

## 2019-07-06 VITALS
HEART RATE: 90 BPM | OXYGEN SATURATION: 98 % | TEMPERATURE: 98 F | RESPIRATION RATE: 16 BRPM | DIASTOLIC BLOOD PRESSURE: 77 MMHG | SYSTOLIC BLOOD PRESSURE: 157 MMHG

## 2019-07-06 DIAGNOSIS — Z98.89 OTHER SPECIFIED POSTPROCEDURAL STATES: Chronic | ICD-10-CM

## 2019-07-06 DIAGNOSIS — Z95.5 PRESENCE OF CORONARY ANGIOPLASTY IMPLANT AND GRAFT: Chronic | ICD-10-CM

## 2019-07-06 LAB
ALBUMIN SERPL ELPH-MCNC: 3.9 G/DL — SIGNIFICANT CHANGE UP (ref 3.3–5)
ALP SERPL-CCNC: 56 U/L — SIGNIFICANT CHANGE UP (ref 40–120)
ALT FLD-CCNC: 13 U/L — SIGNIFICANT CHANGE UP (ref 4–41)
ANION GAP SERPL CALC-SCNC: 13 MMO/L — SIGNIFICANT CHANGE UP (ref 7–14)
APTT BLD: 31.2 SEC — SIGNIFICANT CHANGE UP (ref 27.5–36.3)
AST SERPL-CCNC: 17 U/L — SIGNIFICANT CHANGE UP (ref 4–40)
BASOPHILS # BLD AUTO: 0.02 K/UL — SIGNIFICANT CHANGE UP (ref 0–0.2)
BASOPHILS NFR BLD AUTO: 0.3 % — SIGNIFICANT CHANGE UP (ref 0–2)
BILIRUB SERPL-MCNC: 0.3 MG/DL — SIGNIFICANT CHANGE UP (ref 0.2–1.2)
BUN SERPL-MCNC: 30 MG/DL — HIGH (ref 7–23)
CALCIUM SERPL-MCNC: 9.2 MG/DL — SIGNIFICANT CHANGE UP (ref 8.4–10.5)
CHLORIDE SERPL-SCNC: 102 MMOL/L — SIGNIFICANT CHANGE UP (ref 98–107)
CO2 SERPL-SCNC: 23 MMOL/L — SIGNIFICANT CHANGE UP (ref 22–31)
CREAT SERPL-MCNC: 1.61 MG/DL — HIGH (ref 0.5–1.3)
EOSINOPHIL # BLD AUTO: 0.22 K/UL — SIGNIFICANT CHANGE UP (ref 0–0.5)
EOSINOPHIL NFR BLD AUTO: 3.6 % — SIGNIFICANT CHANGE UP (ref 0–6)
GLUCOSE SERPL-MCNC: 219 MG/DL — HIGH (ref 70–99)
HCT VFR BLD CALC: 36.7 % — LOW (ref 39–50)
HGB BLD-MCNC: 11.9 G/DL — LOW (ref 13–17)
IMM GRANULOCYTES NFR BLD AUTO: 0.5 % — SIGNIFICANT CHANGE UP (ref 0–1.5)
INR BLD: 1.09 — SIGNIFICANT CHANGE UP (ref 0.88–1.17)
LYMPHOCYTES # BLD AUTO: 1.58 K/UL — SIGNIFICANT CHANGE UP (ref 1–3.3)
LYMPHOCYTES # BLD AUTO: 26 % — SIGNIFICANT CHANGE UP (ref 13–44)
MCHC RBC-ENTMCNC: 29.5 PG — SIGNIFICANT CHANGE UP (ref 27–34)
MCHC RBC-ENTMCNC: 32.4 % — SIGNIFICANT CHANGE UP (ref 32–36)
MCV RBC AUTO: 91.1 FL — SIGNIFICANT CHANGE UP (ref 80–100)
MONOCYTES # BLD AUTO: 0.62 K/UL — SIGNIFICANT CHANGE UP (ref 0–0.9)
MONOCYTES NFR BLD AUTO: 10.2 % — SIGNIFICANT CHANGE UP (ref 2–14)
NEUTROPHILS # BLD AUTO: 3.6 K/UL — SIGNIFICANT CHANGE UP (ref 1.8–7.4)
NEUTROPHILS NFR BLD AUTO: 59.4 % — SIGNIFICANT CHANGE UP (ref 43–77)
NRBC # FLD: 0 K/UL — SIGNIFICANT CHANGE UP (ref 0–0)
NT-PROBNP SERPL-SCNC: 1506 PG/ML — SIGNIFICANT CHANGE UP
PLATELET # BLD AUTO: 217 K/UL — SIGNIFICANT CHANGE UP (ref 150–400)
PMV BLD: 9.9 FL — SIGNIFICANT CHANGE UP (ref 7–13)
POTASSIUM SERPL-MCNC: 4.7 MMOL/L — SIGNIFICANT CHANGE UP (ref 3.5–5.3)
POTASSIUM SERPL-SCNC: 4.7 MMOL/L — SIGNIFICANT CHANGE UP (ref 3.5–5.3)
PROT SERPL-MCNC: 7.3 G/DL — SIGNIFICANT CHANGE UP (ref 6–8.3)
PROTHROM AB SERPL-ACNC: 12.5 SEC — SIGNIFICANT CHANGE UP (ref 9.8–13.1)
RBC # BLD: 4.03 M/UL — LOW (ref 4.2–5.8)
RBC # FLD: 13.9 % — SIGNIFICANT CHANGE UP (ref 10.3–14.5)
SODIUM SERPL-SCNC: 138 MMOL/L — SIGNIFICANT CHANGE UP (ref 135–145)
TROPONIN T, HIGH SENSITIVITY: 29 NG/L — SIGNIFICANT CHANGE UP (ref ?–14)
WBC # BLD: 6.07 K/UL — SIGNIFICANT CHANGE UP (ref 3.8–10.5)
WBC # FLD AUTO: 6.07 K/UL — SIGNIFICANT CHANGE UP (ref 3.8–10.5)

## 2019-07-06 PROCEDURE — 70450 CT HEAD/BRAIN W/O DYE: CPT | Mod: 26

## 2019-07-06 PROCEDURE — 71045 X-RAY EXAM CHEST 1 VIEW: CPT | Mod: 26

## 2019-07-06 RX ORDER — SODIUM CHLORIDE 9 MG/ML
500 INJECTION INTRAMUSCULAR; INTRAVENOUS; SUBCUTANEOUS ONCE
Refills: 0 | Status: DISCONTINUED | OUTPATIENT
Start: 2019-07-06 | End: 2019-07-06

## 2019-07-06 NOTE — ED ADULT NURSE NOTE - OBJECTIVE STATEMENT
Patient called.  Patient states that she saw Dr. Stone in January and was prescribed cephalexin for leg sores.  Patient said that the sores went away but are now back and she is wondering if she can get a refill.  Please call patient back to advise.  She can be reached at [phone].  Patients pharmacy is [facility].   Pt received to room 26 hx CVA and recent stent placement this past May presenting with headache and chest discomfort. pt reports feeling "dizzy around 230pm and almost collapsing". Pt a&ox4 and ambulatory with assistance, skin intact, respirations even and unlabored, abd soft and non-distended, non-tender to palpation. Pt endorses numbness to left foot. pt has 1+ pitting edema to b/l lower extremities, as per pt is his baseline. pt denies fever, chills, and n/v. 20G placed in left arm, labs drawn and sent. pt placed on CM-NSR, ECG in progress, will continue to monitor.

## 2019-07-06 NOTE — ED ADULT NURSE NOTE - CHIEF COMPLAINT QUOTE
c/o feeling unbalanced and dizzy and difficulty taking breath since approx 1430 , denies CP/N/V/HA (feels posterior head heaviness), pt worried he is having stroke (20yrs ago)  PMH: DM2, stents, HTN, eval by Dr. HeathLyiysawvea-TWX-GP CODE STROKE

## 2019-07-06 NOTE — ED PROVIDER NOTE - CLINICAL SUMMARY MEDICAL DECISION MAKING FREE TEXT BOX
84 y/o M with PMHx of CAD s/p 4V CABG (1999 at OSH in [place], [place]) s/p 1 stent @ [facility] in 2009 s/p 2 stents May 2018, DM, HLD, HTN, and CVA (1998) who was recently admitted to CCU May 2019 had GEMMA x1 to saphenous vein graft to OM1 on DAPT presenting with exertional lightheadedness/unsteady gait and an episode of chest heaviness. Hypertensive otherwise stable vital signs, LE edema otherwise benign physical exam. Given exertional nature concern for cardiac etiology of symptoms, lower suspicion for acute neurological process however still considering given acute on chronic HA and DAPT. Will obtain CBC, CMP, troponin, CXR, EKG, CT head w/o contrast and reevaluate. 84 y/o M c complex PMHx p/with exertional lightheadedness/unsteady gait and an episode of chest heaviness. Physical exam generally unrevealing with no significant focal infidngs. Given exertional nature concern for cardiac etiology of symptoms, lower suspicion for acute neurological process however still considering given acute on chronic HA and DAPT. Will obtain CBC, CMP, troponin, CXR, EKG, CT head w/o contrast and reevaluate.

## 2019-07-06 NOTE — ED PROVIDER NOTE - PROGRESS NOTE DETAILS
[facility] EM/IM PGY2: Left message with patient's cardiologist Dr. Corona to present patient for admission to tele [facility] EM/IM PGY2: Left 2nd message with patient's cardiologist at ~0130 for admission. No callback as of now. Pt endorsed to tele doc of the day for admission.

## 2019-07-06 NOTE — ED PROVIDER NOTE - OBJECTIVE STATEMENT
86yo M with PMHx of CAD s/p 4V CABG (1999 at OSH in [place], [place]) s/p 1 stent @ [facility] in 2009 s/p 2 stents May 2018, DM, HLD, HTN, and CVA (1998) who was recently admitted to CCU May 2019 had GEMMA x1 to saphenous vein graft to OM1 on DAPT presenting with intermittent lightheadedness and feeling "off balance" since yesterday. Pt reports yesterday evening became lightheaded/off balance while hanging coats in his closet, which resolved after several minutes of rest, similar episode occurred today after walking up two steps to his house that also occurred with rest. A/w dyspnea during this sensation as well. He also reports acute on chronic waxing and waning posterior headache. Family reports pt also c/o chest heaviness 30 minutes prior to arrival associated with dyspnea and facial tingling that resolved on its own. Currently, pt feels asymptomatic laying in stretcher. He denies any vision changes, neck pain, palpitations, cough, abd pain, n/v/d, urinary sxs, joint pain, rash, numbness or weakness. 86 y/o M with PMHx of CAD s/p 4V CABG (1999 at OSH in [place], [place]) s/p 1 stent @ [facility] in 2009 s/p 2 stents May 2018, DM, HLD, HTN, and CVA (1998) who was recently admitted to CCU May 2019 had GEMMA x1 to saphenous vein graft to OM1 on DAPT presenting with intermittent lightheadedness and feeling "off balance" since yesterday. Pt reports yesterday evening became lightheaded/off balance while hanging coats in his closet, which resolved after several minutes of rest, similar episode occurred today after walking up two steps to his house that also occurred with rest. A/w dyspnea during this sensation as well. He also reports acute on chronic waxing and waning posterior headache. Family reports pt also c/o chest heaviness 30 minutes prior to arrival associated with dyspnea and facial tingling that resolved on its own. Currently, pt feels asymptomatic laying in stretcher. He denies any vision changes, neck pain, palpitations, cough, abd pain, n/v/d, urinary sxs, joint pain, rash, numbness or weakness. 86 y/o M with PMHx of CAD s/p 4V CABG (1999 at OSH in [place], [place]) s/p 1 stent @ [facility] in 2009 s/p 2 stents May 2018, DM, HLD, HTN, and CVA (1998) who was recently admitted to CCU May 2019 had GEMMA x1 to saphenous vein graft to OM1 on DAPT presenting with intermittent lightheadedness and feeling "off balance" since yesterday. Pt reports yesterday evening became lightheaded/off balance while hanging coats in his closet, which resolved after several minutes of rest, similar episode occurred today after walking up two steps to his house that also occurred with rest. A/w dyspnea during this sensation as well. He also reports acute on chronic waxing and waning posterior headache. Family reports pt also c/o chest heaviness 30 minutes prior to arrival associated with dyspnea and facial tingling that resolved on its own. Currently, pt feels asymptomatic laying in stretcher. He denies any vision changes, neck pain, palpitations, cough, abd pain, n/v/d, urinary sxs, joint pain, rash, numbness or weakness.  Well appearing.  No recent trauma.

## 2019-07-06 NOTE — ED PROVIDER NOTE - PHYSICAL EXAMINATION
PHYSICAL EXAM:  GENERAL: Sitting comfortable in bed, in no acute distress  HENMT: Atraumatic, moist mucous membranes, no oropharyngeal exudates or vesicles, uvula is midline  EYES: Clear bilaterally, PERRL, EOMs intact b/l  HEART: RRR, S1/S2, soft systolic murmur  RESPIRATORY:   ABDOMEN: +BS, soft, nontender, nondistended  EXTREMITIES: 3+ pedal edema b/l, 2+ pretibial edema, +2 radial pulses b/l  NEURO:  A&Ox4, CN II-XII intact, no focal motor deficits or sensory deficits   Heme/LYMPH: No ecchymosis or bruising, no anterior/posterior cervical or supraclavicular LAD  SKIN: Skin normal color for race, warm, dry and intact. No evidence of rash. PHYSICAL EXAM:  GENERAL: Sitting comfortable in bed, in no acute distress  HENMT: Atraumatic, moist mucous membranes, no oropharyngeal exudates or vesicles, uvula is midline  EYES: Clear bilaterally, PERRL, EOMs intact b/l  HEART: RRR, S1/S2, soft systolic murmur  RESPIRATORY: b/l basilar crackles, no wheezing noted  ABDOMEN: +BS, soft, nontender, nondistended  EXTREMITIES: 3+ pedal edema b/l, 2+ pretibial edema, +2 radial pulses b/l  NEURO:  A&Ox4, CN II-XII intact, no focal motor deficits or sensory deficits   Heme/LYMPH: No ecchymosis or bruising, no anterior/posterior cervical or supraclavicular LAD  SKIN: Skin normal color for race, warm, dry and intact. No evidence of rash.

## 2019-07-06 NOTE — ED PROVIDER NOTE - ATTENDING CONTRIBUTION TO CARE
Attending Attestation: Dr. Willis  I have personally performed a history and physical examination of the patient and discussed management with the resident as well as the patient.  I reviewed the resident's note and agree with the documented findings and plan of care.  I have authored and modified critical sections of the Provider Note, including but not limited to HPI, Physical Exam and MDM. 84 y/o M c complex PMHx p/with exertional lightheadedness/unsteady gait and an episode of chest heaviness. Physical exam generally unrevealing with no significant focal infidngs. Given exertional nature concern for cardiac etiology of symptoms, lower suspicion for acute neurological process however still considering given acute on chronic HA and DAPT. Will obtain CBC, CMP, troponin, CXR, EKG, CT head w/o contrast and reevaluate.

## 2019-07-06 NOTE — ED PROVIDER NOTE - NS ED ROS FT
Constitutional: (+) lightheaded, no fever and no chills  Eyes: no discharge, no irritation, no pain, no visual changes  ENMT: no ear pain or hearing loss, no dysphagia or throat pain  Neck: no pain, no stiffness, no swollen glands  CV: (+) chest pain, no palpitations, (+) edema  Resp: no cough, (+) shortness of breath  Abd: no abdominal pain, no nausea or vomiting, no diarrhea  : no dysuria, no hematuria  MSK: no back pain, no neck pain, no joint pain  Neuro: no LOC, (+) unsteady gait, (+) headache, no sensory deficits, no weakness  Skin: no rashes, no lacerations, no lesions

## 2019-07-06 NOTE — ED ADULT TRIAGE NOTE - CHIEF COMPLAINT QUOTE
c/o feeling unbalanced and dizzy and difficulty taking breath since approx 1430 , denies CP/N/V/HA (feels posterior head heaviness), pt worried he is having stroke (20yrs ago)  PMH: DM2, stents, HTN c/o feeling unbalanced and dizzy and difficulty taking breath since approx 1430 , denies CP/N/V/HA (feels posterior head heaviness), pt worried he is having stroke (20yrs ago)  PMH: DM2, stents, HTN, eval by Dr. HeathYbtnadgqus-YUP-KU CODE STROKE

## 2019-07-07 DIAGNOSIS — R42 DIZZINESS AND GIDDINESS: ICD-10-CM

## 2019-07-07 DIAGNOSIS — Z29.9 ENCOUNTER FOR PROPHYLACTIC MEASURES, UNSPECIFIED: ICD-10-CM

## 2019-07-07 DIAGNOSIS — E11.65 TYPE 2 DIABETES MELLITUS WITH HYPERGLYCEMIA: ICD-10-CM

## 2019-07-07 DIAGNOSIS — I10 ESSENTIAL (PRIMARY) HYPERTENSION: ICD-10-CM

## 2019-07-07 DIAGNOSIS — I63.9 CEREBRAL INFARCTION, UNSPECIFIED: ICD-10-CM

## 2019-07-07 DIAGNOSIS — E78.5 HYPERLIPIDEMIA, UNSPECIFIED: ICD-10-CM

## 2019-07-07 DIAGNOSIS — G62.9 POLYNEUROPATHY, UNSPECIFIED: ICD-10-CM

## 2019-07-07 DIAGNOSIS — I20.9 ANGINA PECTORIS, UNSPECIFIED: ICD-10-CM

## 2019-07-07 LAB
ANION GAP SERPL CALC-SCNC: 12 MMO/L — SIGNIFICANT CHANGE UP (ref 7–14)
APPEARANCE UR: CLEAR — SIGNIFICANT CHANGE UP
BILIRUB UR-MCNC: NEGATIVE — SIGNIFICANT CHANGE UP
BLOOD UR QL VISUAL: NEGATIVE — SIGNIFICANT CHANGE UP
BUN SERPL-MCNC: 22 MG/DL — SIGNIFICANT CHANGE UP (ref 7–23)
CALCIUM SERPL-MCNC: 9.4 MG/DL — SIGNIFICANT CHANGE UP (ref 8.4–10.5)
CHLORIDE SERPL-SCNC: 101 MMOL/L — SIGNIFICANT CHANGE UP (ref 98–107)
CO2 SERPL-SCNC: 26 MMOL/L — SIGNIFICANT CHANGE UP (ref 22–31)
COLOR SPEC: SIGNIFICANT CHANGE UP
CREAT SERPL-MCNC: 1.32 MG/DL — HIGH (ref 0.5–1.3)
GLUCOSE SERPL-MCNC: 243 MG/DL — HIGH (ref 70–99)
GLUCOSE UR-MCNC: 1000 — HIGH
HCT VFR BLD CALC: 40.2 % — SIGNIFICANT CHANGE UP (ref 39–50)
HGB BLD-MCNC: 13 G/DL — SIGNIFICANT CHANGE UP (ref 13–17)
KETONES UR-MCNC: NEGATIVE — SIGNIFICANT CHANGE UP
LEUKOCYTE ESTERASE UR-ACNC: NEGATIVE — SIGNIFICANT CHANGE UP
MCHC RBC-ENTMCNC: 29.6 PG — SIGNIFICANT CHANGE UP (ref 27–34)
MCHC RBC-ENTMCNC: 32.3 % — SIGNIFICANT CHANGE UP (ref 32–36)
MCV RBC AUTO: 91.6 FL — SIGNIFICANT CHANGE UP (ref 80–100)
NITRITE UR-MCNC: NEGATIVE — SIGNIFICANT CHANGE UP
NRBC # FLD: 0 K/UL — SIGNIFICANT CHANGE UP (ref 0–0)
PH UR: 7 — SIGNIFICANT CHANGE UP (ref 5–8)
PLATELET # BLD AUTO: 215 K/UL — SIGNIFICANT CHANGE UP (ref 150–400)
PMV BLD: 10.3 FL — SIGNIFICANT CHANGE UP (ref 7–13)
POTASSIUM SERPL-MCNC: 5 MMOL/L — SIGNIFICANT CHANGE UP (ref 3.5–5.3)
POTASSIUM SERPL-SCNC: 5 MMOL/L — SIGNIFICANT CHANGE UP (ref 3.5–5.3)
PROT UR-MCNC: NEGATIVE — SIGNIFICANT CHANGE UP
RBC # BLD: 4.39 M/UL — SIGNIFICANT CHANGE UP (ref 4.2–5.8)
RBC # FLD: 14 % — SIGNIFICANT CHANGE UP (ref 10.3–14.5)
SODIUM SERPL-SCNC: 139 MMOL/L — SIGNIFICANT CHANGE UP (ref 135–145)
SP GR SPEC: 1.01 — SIGNIFICANT CHANGE UP (ref 1–1.04)
TROPONIN T, HIGH SENSITIVITY: 28 NG/L — SIGNIFICANT CHANGE UP (ref ?–14)
UROBILINOGEN FLD QL: NORMAL — SIGNIFICANT CHANGE UP
WBC # BLD: 7.17 K/UL — SIGNIFICANT CHANGE UP (ref 3.8–10.5)
WBC # FLD AUTO: 7.17 K/UL — SIGNIFICANT CHANGE UP (ref 3.8–10.5)

## 2019-07-07 PROCEDURE — 70551 MRI BRAIN STEM W/O DYE: CPT | Mod: 26

## 2019-07-07 PROCEDURE — 70548 MR ANGIOGRAPHY NECK W/DYE: CPT | Mod: 26

## 2019-07-07 PROCEDURE — 70544 MR ANGIOGRAPHY HEAD W/O DYE: CPT | Mod: 26,59

## 2019-07-07 RX ORDER — INSULIN LISPRO 100/ML
VIAL (ML) SUBCUTANEOUS
Refills: 0 | Status: DISCONTINUED | OUTPATIENT
Start: 2019-07-07 | End: 2019-07-08

## 2019-07-07 RX ORDER — INSULIN LISPRO 100/ML
VIAL (ML) SUBCUTANEOUS AT BEDTIME
Refills: 0 | Status: DISCONTINUED | OUTPATIENT
Start: 2019-07-07 | End: 2019-07-08

## 2019-07-07 RX ORDER — LOSARTAN POTASSIUM 100 MG/1
25 TABLET, FILM COATED ORAL DAILY
Refills: 0 | Status: DISCONTINUED | OUTPATIENT
Start: 2019-07-07 | End: 2019-07-08

## 2019-07-07 RX ORDER — DEXTROSE 50 % IN WATER 50 %
25 SYRINGE (ML) INTRAVENOUS ONCE
Refills: 0 | Status: DISCONTINUED | OUTPATIENT
Start: 2019-07-07 | End: 2019-07-08

## 2019-07-07 RX ORDER — HEPARIN SODIUM 5000 [USP'U]/ML
5000 INJECTION INTRAVENOUS; SUBCUTANEOUS EVERY 8 HOURS
Refills: 0 | Status: DISCONTINUED | OUTPATIENT
Start: 2019-07-07 | End: 2019-07-08

## 2019-07-07 RX ORDER — INSULIN GLARGINE 100 [IU]/ML
20 INJECTION, SOLUTION SUBCUTANEOUS EVERY MORNING
Refills: 0 | Status: DISCONTINUED | OUTPATIENT
Start: 2019-07-07 | End: 2019-07-08

## 2019-07-07 RX ORDER — GABAPENTIN 400 MG/1
400 CAPSULE ORAL DAILY
Refills: 0 | Status: DISCONTINUED | OUTPATIENT
Start: 2019-07-07 | End: 2019-07-08

## 2019-07-07 RX ORDER — CLOPIDOGREL BISULFATE 75 MG/1
75 TABLET, FILM COATED ORAL DAILY
Refills: 0 | Status: DISCONTINUED | OUTPATIENT
Start: 2019-07-07 | End: 2019-07-08

## 2019-07-07 RX ORDER — SENNA PLUS 8.6 MG/1
2 TABLET ORAL AT BEDTIME
Refills: 0 | Status: DISCONTINUED | OUTPATIENT
Start: 2019-07-07 | End: 2019-07-08

## 2019-07-07 RX ORDER — LOSARTAN POTASSIUM 100 MG/1
25 TABLET, FILM COATED ORAL DAILY
Refills: 0 | Status: DISCONTINUED | OUTPATIENT
Start: 2019-07-07 | End: 2019-07-07

## 2019-07-07 RX ORDER — CARVEDILOL PHOSPHATE 80 MG/1
6.25 CAPSULE, EXTENDED RELEASE ORAL EVERY 12 HOURS
Refills: 0 | Status: DISCONTINUED | OUTPATIENT
Start: 2019-07-07 | End: 2019-07-08

## 2019-07-07 RX ORDER — PANTOPRAZOLE SODIUM 20 MG/1
40 TABLET, DELAYED RELEASE ORAL
Refills: 0 | Status: DISCONTINUED | OUTPATIENT
Start: 2019-07-07 | End: 2019-07-08

## 2019-07-07 RX ORDER — DEXTROSE 50 % IN WATER 50 %
15 SYRINGE (ML) INTRAVENOUS ONCE
Refills: 0 | Status: DISCONTINUED | OUTPATIENT
Start: 2019-07-07 | End: 2019-07-08

## 2019-07-07 RX ORDER — ASPIRIN/CALCIUM CARB/MAGNESIUM 324 MG
81 TABLET ORAL DAILY
Refills: 0 | Status: DISCONTINUED | OUTPATIENT
Start: 2019-07-07 | End: 2019-07-08

## 2019-07-07 RX ORDER — ATORVASTATIN CALCIUM 80 MG/1
80 TABLET, FILM COATED ORAL AT BEDTIME
Refills: 0 | Status: DISCONTINUED | OUTPATIENT
Start: 2019-07-07 | End: 2019-07-08

## 2019-07-07 RX ORDER — CARVEDILOL PHOSPHATE 80 MG/1
6.25 CAPSULE, EXTENDED RELEASE ORAL EVERY 12 HOURS
Refills: 0 | Status: DISCONTINUED | OUTPATIENT
Start: 2019-07-07 | End: 2019-07-07

## 2019-07-07 RX ORDER — DOCUSATE SODIUM 100 MG
100 CAPSULE ORAL
Refills: 0 | Status: DISCONTINUED | OUTPATIENT
Start: 2019-07-07 | End: 2019-07-08

## 2019-07-07 RX ORDER — GLUCAGON INJECTION, SOLUTION 0.5 MG/.1ML
1 INJECTION, SOLUTION SUBCUTANEOUS ONCE
Refills: 0 | Status: DISCONTINUED | OUTPATIENT
Start: 2019-07-07 | End: 2019-07-08

## 2019-07-07 RX ORDER — DEXTROSE 50 % IN WATER 50 %
12.5 SYRINGE (ML) INTRAVENOUS ONCE
Refills: 0 | Status: DISCONTINUED | OUTPATIENT
Start: 2019-07-07 | End: 2019-07-08

## 2019-07-07 RX ADMIN — HEPARIN SODIUM 5000 UNIT(S): 5000 INJECTION INTRAVENOUS; SUBCUTANEOUS at 21:24

## 2019-07-07 RX ADMIN — Medication 4: at 13:36

## 2019-07-07 RX ADMIN — ATORVASTATIN CALCIUM 80 MILLIGRAM(S): 80 TABLET, FILM COATED ORAL at 21:24

## 2019-07-07 RX ADMIN — LOSARTAN POTASSIUM 25 MILLIGRAM(S): 100 TABLET, FILM COATED ORAL at 07:58

## 2019-07-07 RX ADMIN — CLOPIDOGREL BISULFATE 75 MILLIGRAM(S): 75 TABLET, FILM COATED ORAL at 13:04

## 2019-07-07 RX ADMIN — Medication 81 MILLIGRAM(S): at 13:04

## 2019-07-07 RX ADMIN — SENNA PLUS 2 TABLET(S): 8.6 TABLET ORAL at 23:12

## 2019-07-07 RX ADMIN — INSULIN GLARGINE 20 UNIT(S): 100 INJECTION, SOLUTION SUBCUTANEOUS at 13:03

## 2019-07-07 RX ADMIN — LOSARTAN POTASSIUM 25 MILLIGRAM(S): 100 TABLET, FILM COATED ORAL at 13:04

## 2019-07-07 RX ADMIN — GABAPENTIN 400 MILLIGRAM(S): 400 CAPSULE ORAL at 13:05

## 2019-07-07 RX ADMIN — CARVEDILOL PHOSPHATE 6.25 MILLIGRAM(S): 80 CAPSULE, EXTENDED RELEASE ORAL at 07:58

## 2019-07-07 RX ADMIN — CARVEDILOL PHOSPHATE 6.25 MILLIGRAM(S): 80 CAPSULE, EXTENDED RELEASE ORAL at 17:12

## 2019-07-07 NOTE — H&P ADULT - NSHPLABSRESULTS_GEN_ALL_CORE
CT Head w/o con: neg  EKG: Sinus Rhythm w/ RBBB @ 80 bpm  Trops: 29-->28  proBNP: 1506  CXR prelim: neg

## 2019-07-07 NOTE — H&P ADULT - NEGATIVE CARDIOVASCULAR SYMPTOMS
no dyspnea on exertion/no orthopnea/no claudication/no peripheral edema/no palpitations/no paroxysmal nocturnal dyspnea

## 2019-07-07 NOTE — CONSULT NOTE ADULT - SUBJECTIVE AND OBJECTIVE BOX
HPI:  85M [facility] w/ PMH of 4V CABG s/p stent 5/2019 (on dual antiplatelet therapy), DM, HTN, HLD, CVA 1998 (w/ residual left sided sensory loss/weakness) presents w/ difficulty ambulating.     Patient states he was opening his door to his home yesterday at 2pm, when he suddenly loss balance, feeling the need to stabilize himself with the wall and later sit down. Symptoms described as a difficulty w/ balance, no room-spinning sensation or light-headedness. Symptoms progressively improved throughout the day, and were completely resolved by 6pm.  Denies any LOC, chest pain, palpitations, N/V, diaphoresis. Denies falling. Denies any difficulty with speech, difficulty swallowing, numbness/tingling/weakness.     Patient has history of CVA in 1998 w/ residual left sided sensorimotor deficits. No history of seizure or other neurological disease. Uses a cane to ambulate, however this loss of balance is different from his baseline. Patient on dual-antiplatelet therapy s/p cardiac stent placement in 5/2019. Denies any history of cardiac arrythmia. Not on anticoagulation.     A 10-system ROS was performed and is negative except for those items noted above and/or in the HPI.    PAST MEDICAL & SURGICAL HISTORY:  Stroke w/ left sensorimotor defecits   Myocardial infarction  Stented Coronary Artery: total 5 stents, last stent 5/2019  Diabetes Mellitus Type II  Coronary Artery Disease  Hypertension  Hyperlipemia  S/P coronary artery stent placement: 1/6/09    FAMILY HISTORY:  -None    SOCIAL HISTORY:   T/E/D: Never smoker, no drug use   Occupation:   Lives with:     MEDICATIONS (HOME):  Home Medications:  aspirin 81 mg oral delayed release tablet: 1 tab(s) orally once a day (07 Jul 2019 10:25)  Dexilant 60 mg oral delayed release capsule: 1 cap(s) orally once a day (07 Jul 2019 10:25)  gabapentin 400 mg oral tablet: 400 milligram(s) orally once a day (07 Jul 2019 10:25)  Lantus 100 units/mL subcutaneous solution: 20 unit(s) subcutaneous once a day in the morning (07 Jul 2019 10:25)  Tradjenta 5 mg oral tablet: 1 tab(s) orally once a day (07 Jul 2019 10:25)    MEDICATIONS  (STANDING):  aspirin enteric coated 81 milliGRAM(s) Oral daily  atorvastatin 80 milliGRAM(s) Oral at bedtime  carvedilol 6.25 milliGRAM(s) Oral every 12 hours  clopidogrel Tablet 75 milliGRAM(s) Oral daily  dextrose 50% Injectable 12.5 Gram(s) IV Push once  dextrose 50% Injectable 25 Gram(s) IV Push once  dextrose 50% Injectable 25 Gram(s) IV Push once  gabapentin 400 milliGRAM(s) Oral daily  heparin  Injectable 5000 Unit(s) SubCutaneous every 8 hours  insulin glargine Injectable (LANTUS) 20 Unit(s) SubCutaneous every morning  insulin lispro (HumaLOG) corrective regimen sliding scale   SubCutaneous three times a day before meals  insulin lispro (HumaLOG) corrective regimen sliding scale   SubCutaneous at bedtime  losartan 25 milliGRAM(s) Oral daily  pantoprazole    Tablet 40 milliGRAM(s) Oral before breakfast    MEDICATIONS  (PRN):  dextrose 40% Gel 15 Gram(s) Oral once PRN Blood Glucose LESS THAN 70 milliGRAM(s)/deciliter  glucagon  Injectable 1 milliGRAM(s) IntraMuscular once PRN Glucose LESS THAN 70 milligrams/deciliter    ALLERGIES/INTOLERANCES:  Allergies  carbamazepine (Other)  Cipro (Unknown)  fluoroquinolone antibiotics (Other)  Tegretol (Unknown)    Intolerances  VITALS & EXAMINATION:  Vital Signs Last 24 Hrs  T(C): 36.4 (07 Jul 2019 14:35), Max: 36.7 (06 Jul 2019 19:50)  T(F): 97.6 (07 Jul 2019 14:35), Max: 98 (06 Jul 2019 19:50)  HR: 87 (07 Jul 2019 14:35) (68 - 90)  BP: 117/62 (07 Jul 2019 14:35) (117/62 - 194/72)  BP(mean): --  RR: 15 (07 Jul 2019 14:35) (15 - 18)  SpO2: 100% (07 Jul 2019 14:35) (98% - 100%)    General:  Constitutional: Well appearing middle-eastern male in no acute distress   Respiratory: CTAB  Cardiovascular (>2): RRR no murmurs. Carotid pulsations symmetric, no bruits.     Neurological (>12):  MS: Alert to name, [facility], July 7th 2019. Seymour Vo. Follows simple commands - points to ceiling thumbs up.     Language: Speech is clear, fluent with good repetition & comprehension (able to name objects watch, pen, badge.     CNs: PERRLA (R = 3mm, L = 3mm). VFF. EOMI no nystagmus, no diplopia. V1-3 intact to LT/pinprick, well developed masseter muscles b/l. No facial asymmetry b/l, full eye closure strength b/l. Hearing grossly normal (rubbing fingers) b/l. Symmetric palate elevation in midline. Gag reflex deferred. Head turning & shoulder shrug intact b/l. Tongue midline, normal movements, no atrophy.    Motor: Normal muscle bulk & tone. No noticeable tremor or seizure. No pronator drift.  LUE: 4-  RUE: 5  LLE: 4-  RLE: 5     Sensation: Intact to PP bilaterally throughout     Cortical: Extinction on DSS (neglect): none    Reflexes:              Biceps(C5)       BR(C6)     Triceps(C7)               Patellar(L4)    Achilles(S1)    Plantar Resp  R	2	          2	             2		        2		    2		Down   L	2	          2	             2		        2		    2		Down     Coordination: intact rapid-alt movements. No dysmetria to FTN/HTS    Gait: Normal Romberg. No postural instability. Normal stance and tandem gait.     LABORATORY:  CBC                       13.0   7.17  )-----------( 215      ( 07 Jul 2019 10:39 )             40.2     Chem 07-07    139  |  101  |  22  ----------------------------<  243<H>  5.0   |  26  |  1.32<H>    Ca    9.4      07 Jul 2019 10:39    TPro  7.3  /  Alb  3.9  /  TBili  0.3  /  DBili  x   /  AST  17  /  ALT  13  /  AlkPhos  56  07-06    LFTs LIVER FUNCTIONS - ( 06 Jul 2019 20:34 )  Alb: 3.9 g/dL / Pro: 7.3 g/dL / ALK PHOS: 56 u/L / ALT: 13 u/L / AST: 17 u/L / GGT: x           Coagulopathy PT/INR - ( 06 Jul 2019 20:44 )   PT: 12.5 SEC;   INR: 1.09       PTT - ( 06 Jul 2019 20:44 )  PTT:31.2 SEC  LDL 52 (5/2019)   A1c 9.4 (5/2019)     Radiology (XR, CT, MR, U/S, TTE/RICHARD):  CTH negative for acute intracranial abnormality

## 2019-07-07 NOTE — CONSULT NOTE ADULT - ATTENDING COMMENTS
85-year-old right-handed [place] citizen gentleman first evaluated at [facility] on 7/8/19 with transient gait difficulty. He has a history of stroke in 1996 with residual mild left-sided sensory and motor deficits. He underwent coronary stenting in 5/19 and has been treated with aspirin and Plavix. On 7/6/19, he developed acute gait difficulty, possibly postural instability although it's uncertain whether there might have been leg weakness, side unknown. ROS otherwise negative. Exam. Alert and attentive; left side: deltoid 4/5; wrist extensors 5/5; iliopsoas initially 5/5 but then with give way was 4/5; anterior tibialis 5/5; gait not tested; remainder of neurologic exam was nonfocal. MRI brain (7/7/19) to my eye was unremarkable. MRA brain (7/7/19) to my eye showed multifocal intracranial stenoses: Severe Right cavernous ICA; right M1; right A1; left supraclinoid ICA; right vertebral artery-V4 segment; bilateral PCA. MRA neck (7/7/19) to my eye showed right ICA stenosis of approximately 50%. Impression. He has a history of stroke in 1996 with mild residual left-sided sensory-motor deficits. He underwent coronary stenting in 5/19 and has been on aspirin and Plavix. On 7/6/19, he developed acute gait difficulty, possibly imbalance, lasting several hours. His presentation is consistent with probable transient cerebral dysfunction, localization indeterminate, possibly involving cerebellum or cerebellar connections. 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 Suggest. Elective TTE can be done as inpatient or outpatient; check serum P2Y12; depending on P2Y12, either no change in Plavix, versus increasing Plavix dose versus changing to ticagrelor; atorvastatin 80 mg daily-dose can be titrated depending on LDL; patient and P2y12 can be followed as an outpatient with our stroke nurse practitioner; from neurovascular standpoint, cleared for discharge.

## 2019-07-07 NOTE — H&P ADULT - ASSESSMENT
86 y/o M with PMHx of CAD s/p 4V CABG, Cardiac Stents (last stent 5/2019,) DM, HLD, HTN, and CVA (w/ L paresthesia in 1998) p/w sudden lower extremity weakness and ataxia, as well as chest pain admitted to tele for Stroke and Angina Pectoris.

## 2019-07-07 NOTE — H&P ADULT - NSICDXPASTMEDICALHX_GEN_ALL_CORE_FT
PAST MEDICAL HISTORY:  Coronary Artery Disease     Diabetes Mellitus Type II     Hyperlipemia     Hypertension     Myocardial infarction     Neuropathy     Stented Coronary Artery total 5 stents, last stent 5/2019    Stroke mild left facial numbness   no other residuals verbalized

## 2019-07-07 NOTE — H&P ADULT - EJECTION FRACTION >40 YES
Telephone Encounter by Tina Ceja at 08/30/17 01:55 PM     Author:  Tina Ceja Service:  (none) Author Type:       Filed:  08/30/17 01:58 PM Encounter Date:  8/30/2017 Status:  Signed     :  Tina Ceja ()              ALMA QUINONES    Patient Age: 78 year old    ACCT STATUS:   MESSAGE:[KD1.1T]   Patients daughter Myrtle (on verbal) calling to speak with the nurse to clarify the amiodarone medication name. Myrtle states medicine bottle states amiodarone HCL but med list shows amiodarone. Please advise with Myrtle or patient.[KD1.1M]    Routed to [facility] nurse pool   Next and Last Visit with Provider and Department  Next visit with ANALISA SANCHEZ is on 09/25/2017 at  8:30 AM in CARDIOLOGY HLD  Next visit with CARDIOLOGY is on 09/25/2017 at  8:30 AM in CARDIOLOGY HLD  Last visit with ANALISA SANCHEZ was on 03/20/2017 at  3:30 PM in CARDIOLOGY HLD  Last visit with CARDIOLOGY was on 04/17/2017 at 12:30 PM in CARDIOLOGY SID     WEIGHT AND HEIGHT: As of 08/29/2017 weight is 175.8 lbs.(79.742 kg). Height is 5' 9\"(1.753 m).   BMI is 25.95 kg/(m^2) calculated from:     Height 5' 9\" (1.753 m) as of 8/29/17     Weight 175 lb 12.8 oz (79.742 kg) as of 8/29/17      Allergies      Allergen   Reactions   • Lisinopril  Cough   • Celebrex [Celecoxib]  Other - See Comments     Renal insufficiency    • Diovan [Valsartan]  Other - See Comments     Hyperkalemia    • Penicillins       RASH, DESQUAMATION    • Byetta [Exenatide]  Diarrhea     Current outpatient prescriptions       Medication  Sig Dispense Refill   • midodrine (PROAMATINE) 5 MG tablet Take 5 mg by mouth 3 (three) times daily.     • doxycycline (VIBRA-TABS) 100 MG tablet Take 100 mg by mouth. Take 1 tablet by mouth twice daily for 10 days     • Linagliptin (TRADJENTA) 5 MG TABS Take 5 mg by mouth daily.     • Probiotic Product (PROBIOTIC DAILY) CAPS Take  by mouth.     • FERROUS SULFATE 325 mg daily.     • atorvastatin (LIPITOR) 40 MG  tablet Take 1 Tab by mouth daily. 90 Tab 0   • amiodarone (PACERONE) 200 MG tablet Take 1 Tab by mouth daily. 90 Tab 1   • furosemide (LASIX) 20 MG tablet TAKE 1 TABLET BY MOUTH 2 TIMES PER WEEK ON MONDAYS AND FRIDAYS 45 Tab 1   • Levothyroxine Sodium 150 MCG CAPS capsule Take 1 Cap by mouth daily. --dose increase 90 Cap 3   • calcitRIOL (ROCALTROL) 0.25 MCG Cap TAKE 1 CAPSULE DAILY 90 Cap 1   • carbidopa-levodopa (SINEMET)  MG per tablet Take 1 Tab by mouth 3 (three) times daily.  0   • insulin glargine (LANTUS) 100 UNIT/ML injection 10 units BID     • venlafaxine (EFFEXOR XR) 150 MG 24 hr Cap Take 1 Cap by mouth 2 (two) times daily. with food in am     • Insulin Pen Needle (BD ULTRA-FINE PEN NEEDLES) 29G X 12.7MM MISC Use with Tresiba nightly. 30 Each 0   • Linagliptin (TRADJENTA) 5 MG TABS Take 5 mg by mouth daily. 90 Each 0   • pantoprazole (PROTONIX) 40 MG tablet Take 1 Tab by mouth daily. 90 Tab 3   • gabapentin (NEURONTIN) 600 MG tablet Take 2 Tabs by mouth 2 (two) times daily. 90 Tab 0   • aspirin 81 MG tablet Take 1 Tab by mouth daily. 30 Tab 12   • Topiramate (TOPAMAX) 25 MG tablet Take 1 Tab by mouth 2 (two) times daily. 60 Tab 0      PHARMACY to use:           Pharmacy preference(s) on file:    [facility] [address] [facility] [address] [facility] [address]    CALL BACK INFO:[KD1.1T] Ok to leave response (including medical information) with family member or on answering machine[KD1.1M]  ROUTING:[KD1.1T] Patient's physician/staff[KD1.1M]        PCP: Santiago Gillette MD         INS: Payor: MEDICARE - ASSIGNED / Plan: *No Plan* / Product Type: *No Product type* / Note: This is the primary coverage, but no account was found for this location or the patient's primary location.   ADDRESS:  [address] [place] [id number][KD1.1T]       Revision History        User Key Date/Time User Provider Type Action    > KD1.1 08/30/17 01:58  Tina Pena  Sign    M - Manual, T - Template             normal LV function/Ejection Fraction...

## 2019-07-07 NOTE — H&P ADULT - RS GEN PE MLT RESP DETAILS PC
breath sounds equal/good air movement/airway patent/no rales/no wheezes/respirations non-labored/clear to auscultation bilaterally/no chest wall tenderness/no rhonchi

## 2019-07-07 NOTE — ED ADULT NURSE REASSESSMENT NOTE - NS ED NURSE REASSESS COMMENT FT1
Resident aware of pt's blood pressure, pt asymptomatic, no new orders at this time, will continue to monitor.

## 2019-07-07 NOTE — H&P ADULT - PROBLEM SELECTOR PLAN 2
tele monitor   cardiac enzymes x 2: neg delta trop  Serial EKGs  DASH 1800 ADA diet  continue ASA, Plavix, Statin, Losartan, and Coreg

## 2019-07-07 NOTE — H&P ADULT - HISTORY OF PRESENT ILLNESS
86 y/o M with PMHx of CAD s/p 4V CABG, Cardiac Stents (last stent 5/2019,) DM, HLD, HTN, and CVA (w/ L paresthesia in 1998) p/w sudden lower extremity weakness and ataxia since yesterday. Pt last known normal yesterday before 2:30pm. At 2:30pm he got off from the car walked 1 flight of stairs and ring the doorbell. Upon walking in the house pt suddenly felt b/l leg weakness and poor balance. Pt's baseline (walks with cane.) He came to [facility] and developed substernal chest heaviness, 5/10, non-pleuritic, non-radiating, lasting for about 30 mins. Pt denies sob, palpitations, dizziness, lightheadedness, changes in vision, nausea, vomiting, abdominal pain, or headache.

## 2019-07-07 NOTE — ED ADULT NURSE REASSESSMENT NOTE - NS ED NURSE REASSESS COMMENT FT1
Report given to [facility] ANNY Aguilera, pt in [facility], being transported over.

## 2019-07-07 NOTE — CONSULT NOTE ADULT - ASSESSMENT
85M [facility] w/ PMH of 4V CABG s/p stent 5/2019 (on dual antiplatelet therapy), DM, HTN, HLD, CVA 1998 (w/ residual left sided sensory loss/weakness) presents w/ difficulty ambulating beginning yesterday 7/6/19 at 2pm, that later resolved at 6pm, back to patient's reported baseline. No other neurological complaints including changes in speech, vision, hearing, swallowing, new numbness/weakness, difficulty walking. Exam findings notable mild weakness 4- w/ LUE and LLE. CTH negative for any acute intracranial abnormality.     Impression: Given the patients extensive medical history (previous CVA and CABG) and vascular RF (DM, HTN, HLD), patient is at high risk for CVA. Patients symptoms have resolved, with no new neurological deficits on exam. Differential includes most likely TIA vs. acute CVA vs. cardiac etiology.     Plan:   -MRI Brain w/o contrast   -MRA Brain w/o contrast   -MRA Neck w/ contrast   -Cardiac telemetry   -TTE w/ bubble study   -A1c, LDL   -Continue dual anti-platelet therapy s/p cardiac stent    -Continue high dose statin   -PT/OT consult   -Dysphagia screen

## 2019-07-07 NOTE — H&P ADULT - ATTENDING COMMENTS
85 year old man with CAD s/p CABG and subsequent PCI (GEMMA to SVG-OM in 5/2019), HTN, HLD and IDDM presents with ataxia and concern for CVA    #Ataxia-  Concern for CVA.  No acute findings on CT scan.  Check MRI.  Continue DAPT with statin.  Neurology consult.     #CAD s/p CABG-  S/P GEMMA to SVG-OM in 5/2019 in the setting of STEMI.  Patient has ruled out for ACS.  Continue DAPT and statin.    #Compensated systolic CHF-  EF 45-50%.   Continue ARB with Coreg. 85 year old man with CAD s/p CABG and subsequent PCI (GEMMA to SVG-OM in 5/2019), HTN, HLD and IDDM presents with ataxia and concern for CVA    #Ataxia-  Concern for CVA.  No acute findings on CT scan.  Check MRI.  Continue DAPT with statin.  Neurology consult.     #CAD s/p CABG-  S/P GEMMA to SVG-OM in 5/2019 in the setting of STEMI.  Patient has ruled out for ACS and EKG is sinus with pre-existing RBBB.   Continue DAPT and statin.    #Compensated systolic CHF-  EF 45-50%.   Continue ARB with Coreg.

## 2019-07-07 NOTE — PATIENT PROFILE ADULT - NSPROHMCARDIOMGMTSTRAT_GEN_A_NUR
routine screening/weight management/diet modification/exercise/medical device/coping strategies/medication therapy

## 2019-07-07 NOTE — H&P ADULT - PROBLEM SELECTOR PLAN 1
tele monitor  house neuro consulted: pending recommendations  Neuro checks Q6h  PT  HgA1c  c/w ASA, Plavix and Statin

## 2019-07-08 ENCOUNTER — TRANSCRIPTION ENCOUNTER (OUTPATIENT)
Age: 84
End: 2019-07-08

## 2019-07-08 VITALS
OXYGEN SATURATION: 98 % | TEMPERATURE: 98 F | RESPIRATION RATE: 18 BRPM | SYSTOLIC BLOOD PRESSURE: 130 MMHG | HEART RATE: 77 BPM | DIASTOLIC BLOOD PRESSURE: 59 MMHG

## 2019-07-08 LAB
ANION GAP SERPL CALC-SCNC: 14 MMO/L — SIGNIFICANT CHANGE UP (ref 7–14)
BUN SERPL-MCNC: 25 MG/DL — HIGH (ref 7–23)
CALCIUM SERPL-MCNC: 9.2 MG/DL — SIGNIFICANT CHANGE UP (ref 8.4–10.5)
CHLORIDE SERPL-SCNC: 101 MMOL/L — SIGNIFICANT CHANGE UP (ref 98–107)
CO2 SERPL-SCNC: 22 MMOL/L — SIGNIFICANT CHANGE UP (ref 22–31)
CREAT SERPL-MCNC: 1.34 MG/DL — HIGH (ref 0.5–1.3)
GLUCOSE SERPL-MCNC: 78 MG/DL — SIGNIFICANT CHANGE UP (ref 70–99)
HCT VFR BLD CALC: 39.9 % — SIGNIFICANT CHANGE UP (ref 39–50)
HGB BLD-MCNC: 13 G/DL — SIGNIFICANT CHANGE UP (ref 13–17)
MCHC RBC-ENTMCNC: 29.6 PG — SIGNIFICANT CHANGE UP (ref 27–34)
MCHC RBC-ENTMCNC: 32.6 % — SIGNIFICANT CHANGE UP (ref 32–36)
MCV RBC AUTO: 90.9 FL — SIGNIFICANT CHANGE UP (ref 80–100)
NRBC # FLD: 0 K/UL — SIGNIFICANT CHANGE UP (ref 0–0)
PLATELET # BLD AUTO: 223 K/UL — SIGNIFICANT CHANGE UP (ref 150–400)
PMV BLD: 10.2 FL — SIGNIFICANT CHANGE UP (ref 7–13)
POTASSIUM SERPL-MCNC: 4.3 MMOL/L — SIGNIFICANT CHANGE UP (ref 3.5–5.3)
POTASSIUM SERPL-SCNC: 4.3 MMOL/L — SIGNIFICANT CHANGE UP (ref 3.5–5.3)
RBC # BLD: 4.39 M/UL — SIGNIFICANT CHANGE UP (ref 4.2–5.8)
RBC # FLD: 13.9 % — SIGNIFICANT CHANGE UP (ref 10.3–14.5)
SODIUM SERPL-SCNC: 137 MMOL/L — SIGNIFICANT CHANGE UP (ref 135–145)
WBC # BLD: 7.99 K/UL — SIGNIFICANT CHANGE UP (ref 3.8–10.5)
WBC # FLD AUTO: 7.99 K/UL — SIGNIFICANT CHANGE UP (ref 3.8–10.5)

## 2019-07-08 PROCEDURE — 99223 1ST HOSP IP/OBS HIGH 75: CPT

## 2019-07-08 RX ADMIN — Medication 81 MILLIGRAM(S): at 12:38

## 2019-07-08 RX ADMIN — INSULIN GLARGINE 20 UNIT(S): 100 INJECTION, SOLUTION SUBCUTANEOUS at 08:22

## 2019-07-08 RX ADMIN — CARVEDILOL PHOSPHATE 6.25 MILLIGRAM(S): 80 CAPSULE, EXTENDED RELEASE ORAL at 05:39

## 2019-07-08 RX ADMIN — Medication 1: at 12:38

## 2019-07-08 RX ADMIN — PANTOPRAZOLE SODIUM 40 MILLIGRAM(S): 20 TABLET, DELAYED RELEASE ORAL at 05:39

## 2019-07-08 RX ADMIN — CLOPIDOGREL BISULFATE 75 MILLIGRAM(S): 75 TABLET, FILM COATED ORAL at 12:38

## 2019-07-08 RX ADMIN — GABAPENTIN 400 MILLIGRAM(S): 400 CAPSULE ORAL at 12:38

## 2019-07-08 RX ADMIN — LOSARTAN POTASSIUM 25 MILLIGRAM(S): 100 TABLET, FILM COATED ORAL at 05:39

## 2019-07-08 NOTE — CHART NOTE - NSCHARTNOTEFT_GEN_A_CORE
Impression: Transient gait dysfunction w/ poor localization possibly 2/2 TIA (ABCD: 7) in the setting of diffuse intracranial atherosclerosis in patient w/ extensive vascular risk factors   Plan:   Imaging  [x]CTH: no acute infarct, hemorrhage, mass   [x]MRI brain w/o: no acute infarct   [x]MRA H/N: multifocal intracranial stenosis (b/l ICAs, M1s, right A1, right vertebral, left P1)   [x]tele   [x]TTE w/ bubble: ordered   Labs: []P2Y12 [9.4]A1c [52]LDL   Meds: [81]ASA [75]plavix [80]statin [x]DVT prophylaxis  Consults: []SS []PT/OT   Recommendations  -please check P2Y12 prior to discharge   -family concerned regarding rehab services, please follow up w/ PT/OT regarding final recommendations   -c/w dual antiplatelet therapy for both cardiac as well as stroke prevention  -c/w secondary stroke prevention w/ risk factor control   -TTE w/ bubble can be done outpatient   -Patient follow up w/ Eve Franklin, [address] [place], [place]  [phone] within 1 week upon discharge for long term management

## 2019-07-08 NOTE — DISCHARGE NOTE NURSING/CASE MANAGEMENT/SOCIAL WORK - NSDCPEPTSTRK_GEN_ALL_CORE
Stroke support groups for patients, families, and friends/Stroke warning signs and symptoms/Signs and symptoms of stroke/Prescribed medications/Risk factors for stroke/Stroke education booklet/Call 911 for stroke/Need for follow up after discharge

## 2019-07-08 NOTE — PHYSICAL THERAPY INITIAL EVALUATION ADULT - PERTINENT HX OF CURRENT PROBLEM, REHAB EVAL
Patient is an 85 year old male admitted to [facility] on 7/7 with sudden lower extremity weakness and. PMH of CAD s/p 4V CABG, Cardiac Stents (last stent 5/2019,) DM, HLD, HTN, and CVA (with Left paresthesia in 1998). Patient is an 85 year old male admitted to [facility] on 7/7 with sudden lower extremity weakness and. PMH of CAD s/p 4V CABG, Cardiac Stents (last stent 5/2019,) DM, HLD, HTN, and CVA (with Left paresthesia in 1998). CT Brain: chronic white matter microvascular ischemic changes, otherwise no acute findings.

## 2019-07-08 NOTE — DISCHARGE NOTE PROVIDER - CARE PROVIDER_API CALL
Dick Wade)  Internal Medicine  [address] [place] [address]  Phone: [phone]  Fax: [phone]  Follow Up Time: Jerry Corona)  Cardiology  [address] [facility], [place] [address]  Phone: [phone]  Fax: [phone]  Follow Up Time:

## 2019-07-08 NOTE — PROGRESS NOTE ADULT - SUBJECTIVE AND OBJECTIVE BOX
Cardiovascular Disease Progress Note    Overnight events: No acute events overnight. Mr. Foss denies chest pain or SOB.   Otherwise review of systems negative    Objective Findings:  T(C): 36.6 (07-08-19 @ 05:38), Max: 36.6 (07-08-19 @ 05:38)  HR: 81 (07-08-19 @ 05:38) (74 - 90)  BP: 129/61 (07-08-19 @ 05:38) (117/62 - 171/72)  RR: 17 (07-08-19 @ 05:38) (15 - 18)  SpO2: 97% (07-08-19 @ 05:38) (97% - 100%)  Wt(kg): --  Daily Height in cm: 172.72 (07 Jul 2019 10:37)    Daily       Physical Exam:  Gen: NAD  HEENT: EOMI  CV: RRR, normal S1 + S2, no m/r/g  Lungs: CTAB  Abd: soft, non-tender  Ext: No edema    Telemetry: Sinus    Laboratory Data:                        13.0   7.17  )-----------( 215      ( 07 Jul 2019 10:39 )             40.2     07-07    139  |  101  |  22  ----------------------------<  243<H>  5.0   |  26  |  1.32<H>    Ca    9.4      07 Jul 2019 10:39    TPro  7.3  /  Alb  3.9  /  TBili  0.3  /  DBili  x   /  AST  17  /  ALT  13  /  AlkPhos  56  07-06    PT/INR - ( 06 Jul 2019 20:44 )   PT: 12.5 SEC;   INR: 1.09          PTT - ( 06 Jul 2019 20:44 )  PTT:31.2 SEC          Inpatient Medications:  MEDICATIONS  (STANDING):  aspirin enteric coated 81 milliGRAM(s) Oral daily  atorvastatin 80 milliGRAM(s) Oral at bedtime  carvedilol 6.25 milliGRAM(s) Oral every 12 hours  clopidogrel Tablet 75 milliGRAM(s) Oral daily  dextrose 50% Injectable 12.5 Gram(s) IV Push once  dextrose 50% Injectable 25 Gram(s) IV Push once  dextrose 50% Injectable 25 Gram(s) IV Push once  docusate sodium 100 milliGRAM(s) Oral two times a day  gabapentin 400 milliGRAM(s) Oral daily  heparin  Injectable 5000 Unit(s) SubCutaneous every 8 hours  insulin glargine Injectable (LANTUS) 20 Unit(s) SubCutaneous every morning  insulin lispro (HumaLOG) corrective regimen sliding scale   SubCutaneous three times a day before meals  insulin lispro (HumaLOG) corrective regimen sliding scale   SubCutaneous at bedtime  losartan 25 milliGRAM(s) Oral daily  pantoprazole    Tablet 40 milliGRAM(s) Oral before breakfast  senna 2 Tablet(s) Oral at bedtime      Assessment: 85 year old man with CAD s/p CABG and subsequent PCI (GEMMA to SVG-OM in 5/2019), HTN, HLD and IDDM presents with ataxia and concern for CVA    Plan:    #Ataxia-  Concern for CVA vs. TIA.  No acute findings on CT scan.  Neurology consult appreciated- awaiting MRI / MRA results.  Continue DAPT with statin.    #CAD s/p CABG-  S/P GEMMA to SVG-OM in 5/2019 in the setting of STEMI.  No chest pain and EKG is sinus with pre-existing RBBB.   Continue DAPT and statin.    #Compensated systolic CHF-  EF 45-50%.   Continue ARB with Coreg.   PT evaluation.  Discharge planning pending MRI results.     Over 25 minutes spent on total encounter; more than 50% of the visit was spent counseling and/or coordinating care by the attending physician.      Dick Wade MD [facility]  Cardiovascular Disease  [phone] Cardiovascular Disease Progress Note    Overnight events: No acute events overnight. Mr. Foss denies chest pain or SOB.   Otherwise review of systems negative    Objective Findings:  T(C): 36.6 (07-08-19 @ 05:38), Max: 36.6 (07-08-19 @ 05:38)  HR: 81 (07-08-19 @ 05:38) (74 - 90)  BP: 129/61 (07-08-19 @ 05:38) (117/62 - 171/72)  RR: 17 (07-08-19 @ 05:38) (15 - 18)  SpO2: 97% (07-08-19 @ 05:38) (97% - 100%)  Wt(kg): --  Daily Height in cm: 172.72 (07 Jul 2019 10:37)    Daily       Physical Exam:  Gen: NAD  HEENT: EOMI  CV: RRR, normal S1 + S2, no m/r/g  Lungs: CTAB  Abd: soft, non-tender  Ext: No edema    Telemetry: Sinus    Laboratory Data:                        13.0   7.17  )-----------( 215      ( 07 Jul 2019 10:39 )             40.2     07-07    139  |  101  |  22  ----------------------------<  243<H>  5.0   |  26  |  1.32<H>    Ca    9.4      07 Jul 2019 10:39    TPro  7.3  /  Alb  3.9  /  TBili  0.3  /  DBili  x   /  AST  17  /  ALT  13  /  AlkPhos  56  07-06    PT/INR - ( 06 Jul 2019 20:44 )   PT: 12.5 SEC;   INR: 1.09          PTT - ( 06 Jul 2019 20:44 )  PTT:31.2 SEC          Inpatient Medications:  MEDICATIONS  (STANDING):  aspirin enteric coated 81 milliGRAM(s) Oral daily  atorvastatin 80 milliGRAM(s) Oral at bedtime  carvedilol 6.25 milliGRAM(s) Oral every 12 hours  clopidogrel Tablet 75 milliGRAM(s) Oral daily  dextrose 50% Injectable 12.5 Gram(s) IV Push once  dextrose 50% Injectable 25 Gram(s) IV Push once  dextrose 50% Injectable 25 Gram(s) IV Push once  docusate sodium 100 milliGRAM(s) Oral two times a day  gabapentin 400 milliGRAM(s) Oral daily  heparin  Injectable 5000 Unit(s) SubCutaneous every 8 hours  insulin glargine Injectable (LANTUS) 20 Unit(s) SubCutaneous every morning  insulin lispro (HumaLOG) corrective regimen sliding scale   SubCutaneous three times a day before meals  insulin lispro (HumaLOG) corrective regimen sliding scale   SubCutaneous at bedtime  losartan 25 milliGRAM(s) Oral daily  pantoprazole    Tablet 40 milliGRAM(s) Oral before breakfast  senna 2 Tablet(s) Oral at bedtime      Assessment: 85 year old man with CAD s/p CABG and subsequent PCI (GEMMA to SVG-OM in 5/2019), HTN, HLD and IDDM presents with ataxia and concern for CVA    Plan:    #Ataxia-  Concern for CVA vs. TIA.  No acute findings on CT scan.  Neurology consult appreciated- awaiting MRI / MRA results.  Continue DAPT with statin.    #CAD s/p CABG-  S/P GEMMA to SVG-OM in 5/2019 in the setting of STEMI.  No chest pain and EKG is sinus with pre-existing RBBB.   Continue DAPT and statin.    #Compensated systolic CHF-  EF 45-50%.   Continue ARB with Coreg.     PT evaluation.  Discharge planning pending MRI results.     Care discussed with patient and grandson at bedside.     Over 25 minutes spent on total encounter; more than 50% of the visit was spent counseling and/or coordinating care by the attending physician.      Dick Wade MD [facility]  Cardiovascular Disease  [phone]

## 2019-07-08 NOTE — PHYSICAL THERAPY INITIAL EVALUATION ADULT - ADDITIONAL COMMENTS
Patient reports he lives with his wife and children in a private house, 3 steps to enter. Patient reports he was previously independent in all ADLs and uses a rolling walker for outside ambulation.    Patient was left semi-supine in bed as found, all lines/tubes intact and call ngo within reach, ANNY mcdonough

## 2019-07-08 NOTE — DISCHARGE NOTE PROVIDER - HOSPITAL COURSE
84 y/o M with PMHx of CAD s/p 4V CABG, Cardiac Stents (last stent 5/2019,) DM, HLD, HTN, and CVA (w/ L paresthesia in 1998) p/w sudden lower extremity weakness and ataxia, as well as chest pain. Pt had CT Head done which was negative for acute hemorrhage or infarct. Awaiting MRI Brain/MRA Brain/Neck. Pt has ruled out for acute coronary syndrome. EKG is Sinus with pre-existing RBBB.  To continue DAPT and statin. Pt with compensated systolic CHF, Continue ARB and Coreg.                     . 84 y/o M with PMHx of CAD s/p 4V CABG, Cardiac Stents (last stent 5/2019,) DM, HLD, HTN, and CVA (w/ L paresthesia in 1998) p/w sudden lower extremity weakness and ataxia, as well as chest pain. Pt had CT Head done which was negative for acute hemorrhage or infarct. MRI Brain/MRA Brain/Neck done which was negative for infarct. Pt has ruled out for acute coronary syndrome. EKG is Sinus with pre-existing RBBB.  To continue DAPT and statin. Pt with compensated systolic CHF, Continue ARB and Coreg.             MRI Brain/MRA Brain/Neck: Unchanged volume loss and microvascular disease, there is no restricted     diffusion, abnormal enhancement, hemorrhage or midline shift.        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 No wall thickening causing fusiform narrowing of the right ICA     approximately 3 cm from its origin, prominent origin the lamina 4.6 mm to     2.3 mm for narrowing of 50%, extending 5 cm with resumption of caliber to     4 mm.        Tortuous extra cranial vessels likely reflecting hypertension without     hemodynamically significant origin stenosis. Patent vertebral arteries.        EKG: Sinus Rhythm w/ RBBB @ 80 bpm    Trops: 29-->28    ProBNP: 1506    CXR: negative        7/8/19 Pt is medically stable for discharge home today as discussed with Dr. Wade with outpatient follow-up. 84 y/o M with PMHx of CAD s/p 4V CABG, Cardiac Stents (last stent 5/2019,) DM, HLD, HTN, and CVA (w/ L paresthesia in 1998) p/w sudden lower extremity weakness and ataxia, as well as chest pain. Pt had CT Head done which was negative for acute hemorrhage or infarct. MRI Brain/MRA Brain/Neck done which was negative for infarct. Pt has ruled out for acute coronary syndrome. EKG is Sinus with pre-existing RBBB.  To continue DAPT and statin. Pt with compensated systolic CHF, Continue ARB and Coreg.             MRI Brain/MRA Brain/Neck: Unchanged volume loss and microvascular disease, there is no restricted     diffusion, abnormal enhancement, hemorrhage or midline shift.        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 No wall thickening causing fusiform narrowing of the right ICA     approximately 3 cm from its origin, prominent origin the lamina 4.6 mm to     2.3 mm for narrowing of 50%, extending 5 cm with resumption of caliber to     4 mm.        Tortuous extra cranial vessels likely reflecting hypertension without     hemodynamically significant origin stenosis. Patent vertebral arteries.        EKG: Sinus Rhythm w/ RBBB @ 80 bpm    Trops: 29-->28    ProBNP: 1506    CXR: negative        No evidence of acute CVA.         7/8/19 Pt is medically stable for discharge home today as discussed with Dr. Wade with outpatient follow-up with Dr. Jerry Corona.

## 2019-07-08 NOTE — PHYSICAL THERAPY INITIAL EVALUATION ADULT - PATIENT PROFILE REVIEW, REHAB EVAL
yes/PT orders received: ambulate with assistance. Consult with ANNY PEREZ, pt may participate in PT evaluation.

## 2019-07-08 NOTE — DISCHARGE NOTE PROVIDER - NSDCACTIVITY_GEN_ALL_CORE
Stairs allowed/Walking - Indoors allowed/No heavy lifting/straining/Walking - Outdoors allowed/Showering allowed

## 2019-07-08 NOTE — DISCHARGE NOTE PROVIDER - NSDCCPCAREPLAN_GEN_ALL_CORE_FT
PRINCIPAL DISCHARGE DIAGNOSIS  Diagnosis: Dizziness  Assessment and Plan of Treatment: You did not have a Stroke or a Heart Attack.   Continue current medications.   Please follow-up with Dr. Corona within 1 week.      SECONDARY DISCHARGE DIAGNOSES  Diagnosis: Type 2 diabetes mellitus with hyperglycemia, with long-term current use of insulin  Assessment and Plan of Treatment: Type 2 diabetes mellitus with hyperglycemia, with long-term current use of insulin.   - Continue Lantus.  - Monitor your fingersticks.    Diagnosis: Hypertension  Assessment and Plan of Treatment: Hypertension  - Continue Coreg, Losartan.   - Monitor your blood pressure.    Diagnosis: HLD (hyperlipidemia)  Assessment and Plan of Treatment: HLD (hyperlipidemia)  - Continue Lipitor.   - Monitor your lipid profile with your doctor.

## 2019-07-31 DIAGNOSIS — Z76.89 PERSONS ENCOUNTERING HEALTH SERVICES IN OTHER SPECIFIED CIRCUMSTANCES: ICD-10-CM

## 2019-10-01 PROCEDURE — G9005: CPT

## 2019-11-01 ENCOUNTER — OUTPATIENT (OUTPATIENT)
Dept: OUTPATIENT SERVICES | Facility: HOSPITAL | Age: 84
LOS: 1 days | End: 2019-11-01
Payer: MEDICARE

## 2019-11-01 DIAGNOSIS — Z95.5 PRESENCE OF CORONARY ANGIOPLASTY IMPLANT AND GRAFT: Chronic | ICD-10-CM

## 2019-11-01 DIAGNOSIS — Z98.89 OTHER SPECIFIED POSTPROCEDURAL STATES: Chronic | ICD-10-CM

## 2019-11-14 DIAGNOSIS — Z76.89 PERSONS ENCOUNTERING HEALTH SERVICES IN OTHER SPECIFIED CIRCUMSTANCES: ICD-10-CM

## 2019-11-14 DIAGNOSIS — Z71.89 OTHER SPECIFIED COUNSELING: ICD-10-CM

## 2020-02-01 PROCEDURE — G9005: CPT

## 2020-09-15 RX ORDER — CARVEDILOL 6.25 MG/1
6.25 TABLET ORAL 2 TIMES DAILY WITH MEALS
Qty: 180 TABLET | Refills: 1 | Status: SHIPPED | OUTPATIENT
Start: 2020-09-15

## 2020-09-15 RX ORDER — ASPIRIN 81 MG/1
81 TABLET ORAL DAILY
Qty: 90 TABLET | Refills: 1 | Status: SHIPPED | OUTPATIENT
Start: 2020-09-15

## 2020-09-15 RX ORDER — CLOPIDOGREL BISULFATE 75 MG/1
75 TABLET ORAL DAILY
Qty: 90 TABLET | Refills: 1 | Status: SHIPPED | OUTPATIENT
Start: 2020-09-15 | End: 2021-01-09 | Stop reason: SDUPTHER

## 2020-09-16 RX ORDER — AMMONIUM LACTATE 12 G/100G
CREAM TOPICAL PRN
Qty: 385 G | Refills: 0 | Status: SHIPPED | OUTPATIENT
Start: 2020-09-16 | End: 2020-12-15 | Stop reason: SDUPTHER

## 2020-09-18 ENCOUNTER — TELEPHONE (OUTPATIENT)
Dept: NEUROLOGY | Age: 85
End: 2020-09-18

## 2020-09-25 DIAGNOSIS — H90.3 BILATERAL SENSORINEURAL HEARING LOSS: ICD-10-CM

## 2020-09-25 DIAGNOSIS — H93.13 TINNITUS OF BOTH EARS: Primary | ICD-10-CM

## 2020-09-30 ENCOUNTER — OFFICE VISIT (OUTPATIENT)
Dept: AUDIOLOGY | Age: 85
End: 2020-09-30

## 2020-09-30 DIAGNOSIS — H90.3 BILATERAL SENSORINEURAL HEARING LOSS: Primary | ICD-10-CM

## 2020-09-30 PROCEDURE — 92557 COMPREHENSIVE HEARING TEST: CPT | Performed by: AUDIOLOGIST

## 2020-09-30 PROCEDURE — 92567 TYMPANOMETRY: CPT | Performed by: AUDIOLOGIST

## 2020-10-02 DIAGNOSIS — H90.3 BILATERAL SENSORINEURAL HEARING LOSS: Primary | ICD-10-CM

## 2020-10-02 RX ORDER — INSULIN GLARGINE 100 [IU]/ML
35 INJECTION, SOLUTION SUBCUTANEOUS 2 TIMES DAILY
Qty: 15 ML | Refills: 12 | Status: SHIPPED | OUTPATIENT
Start: 2020-10-02

## 2020-10-02 RX ORDER — KETOCONAZOLE 20 MG/G
CREAM TOPICAL DAILY
Qty: 60 G | Refills: 3 | Status: SHIPPED | OUTPATIENT
Start: 2020-10-02

## 2020-10-05 ENCOUNTER — TELEPHONE (OUTPATIENT)
Dept: AUDIOLOGY | Age: 85
End: 2020-10-05

## 2020-10-28 ENCOUNTER — IMMUNIZATION (OUTPATIENT)
Dept: FAMILY MEDICINE | Age: 85
End: 2020-10-28

## 2020-10-28 ENCOUNTER — OFFICE VISIT (OUTPATIENT)
Dept: AUDIOLOGY | Age: 85
End: 2020-10-28

## 2020-10-28 DIAGNOSIS — Z23 NEED FOR VACCINATION: Primary | ICD-10-CM

## 2020-10-28 DIAGNOSIS — Z46.1 ENCOUNTER FOR FITTING OR ADJUSTMENT OF HEARING AID: Primary | ICD-10-CM

## 2020-10-28 PROCEDURE — G0008 ADMIN INFLUENZA VIRUS VAC: HCPCS | Performed by: PSYCHIATRY & NEUROLOGY

## 2020-10-28 PROCEDURE — 92591 HEARING AID EXAM, BOTH EARS: CPT | Performed by: AUDIOLOGIST

## 2020-10-28 PROCEDURE — 90662 IIV NO PRSV INCREASED AG IM: CPT | Performed by: PSYCHIATRY & NEUROLOGY

## 2020-10-31 DIAGNOSIS — E08.42 DIABETIC POLYNEUROPATHY ASSOCIATED WITH DIABETES MELLITUS DUE TO UNDERLYING CONDITION (CMD): ICD-10-CM

## 2020-10-31 DIAGNOSIS — H93.13 TINNITUS OF BOTH EARS: ICD-10-CM

## 2020-10-31 DIAGNOSIS — R26.0 ATAXIC GAIT: Primary | ICD-10-CM

## 2020-10-31 DIAGNOSIS — R42 DIZZINESS: ICD-10-CM

## 2020-10-31 RX ORDER — GABAPENTIN 400 MG/1
400 CAPSULE ORAL 2 TIMES DAILY
Qty: 180 CAPSULE | Refills: 3 | Status: SHIPPED | OUTPATIENT
Start: 2020-10-31 | End: 2021-02-08 | Stop reason: SDUPTHER

## 2020-11-03 ENCOUNTER — OFFICE VISIT (OUTPATIENT)
Dept: REHABILITATION | Age: 85
End: 2020-11-03

## 2020-11-03 DIAGNOSIS — R26.0 ATAXIC GAIT: ICD-10-CM

## 2020-11-03 DIAGNOSIS — H93.13 TINNITUS OF BOTH EARS: ICD-10-CM

## 2020-11-03 DIAGNOSIS — R42 DIZZINESS: ICD-10-CM

## 2020-11-03 DIAGNOSIS — E08.42 DIABETIC POLYNEUROPATHY ASSOCIATED WITH DIABETES MELLITUS DUE TO UNDERLYING CONDITION (CMD): ICD-10-CM

## 2020-11-03 PROCEDURE — 97162 PT EVAL MOD COMPLEX 30 MIN: CPT | Performed by: PHYSICAL THERAPIST

## 2020-11-03 PROCEDURE — 97110 THERAPEUTIC EXERCISES: CPT | Performed by: PHYSICAL THERAPIST

## 2020-11-10 ENCOUNTER — OFFICE VISIT (OUTPATIENT)
Dept: REHABILITATION | Age: 85
End: 2020-11-10

## 2020-11-10 DIAGNOSIS — R42 DIZZINESS: ICD-10-CM

## 2020-11-10 DIAGNOSIS — R27.0 ATAXIA: ICD-10-CM

## 2020-11-10 DIAGNOSIS — E13.42 DIABETIC POLYNEUROPATHY ASSOCIATED WITH OTHER SPECIFIED DIABETES MELLITUS (CMD): ICD-10-CM

## 2020-11-10 PROBLEM — E11.42 DIABETIC POLYNEUROPATHY (CMD): Status: ACTIVE | Noted: 2020-11-10

## 2020-11-10 PROCEDURE — 97110 THERAPEUTIC EXERCISES: CPT | Performed by: PHYSICAL THERAPIST

## 2020-11-16 ENCOUNTER — OFFICE VISIT (OUTPATIENT)
Dept: PODIATRY | Age: 85
End: 2020-11-16

## 2020-11-16 ENCOUNTER — APPOINTMENT (OUTPATIENT)
Dept: PODIATRY | Age: 85
End: 2020-11-16

## 2020-11-16 ENCOUNTER — IMAGING SERVICES (OUTPATIENT)
Dept: GENERAL RADIOLOGY | Age: 85
End: 2020-11-16

## 2020-11-16 DIAGNOSIS — M20.5X2 HALLUX LIMITUS OF LEFT FOOT: ICD-10-CM

## 2020-11-16 DIAGNOSIS — M79.671 RIGHT FOOT PAIN: ICD-10-CM

## 2020-11-16 DIAGNOSIS — R09.89 DIMINISHED PULSES IN LOWER EXTREMITY: ICD-10-CM

## 2020-11-16 DIAGNOSIS — E11.51 TYPE 2 DIABETES MELLITUS WITH DIABETIC PERIPHERAL ANGIOPATHY WITHOUT GANGRENE, WITH LONG-TERM CURRENT USE OF INSULIN (CMD): ICD-10-CM

## 2020-11-16 DIAGNOSIS — Z79.4 TYPE 2 DIABETES MELLITUS WITH DIABETIC PERIPHERAL ANGIOPATHY WITHOUT GANGRENE, WITH LONG-TERM CURRENT USE OF INSULIN (CMD): ICD-10-CM

## 2020-11-16 DIAGNOSIS — M20.21 HALLUX RIGIDUS OF RIGHT FOOT: ICD-10-CM

## 2020-11-16 DIAGNOSIS — M20.41 HAMMER TOES OF BOTH FEET: ICD-10-CM

## 2020-11-16 DIAGNOSIS — M20.42 HAMMER TOES OF BOTH FEET: ICD-10-CM

## 2020-11-16 DIAGNOSIS — L03.031 PARONYCHIA OF TOE, RIGHT: Primary | ICD-10-CM

## 2020-11-16 PROCEDURE — 73630 X-RAY EXAM OF FOOT: CPT | Performed by: RADIOLOGY

## 2020-11-16 RX ORDER — RANOLAZINE 500 MG/1
500 TABLET, EXTENDED RELEASE ORAL 2 TIMES DAILY
COMMUNITY
End: 2020-12-15 | Stop reason: SDUPTHER

## 2020-11-17 ENCOUNTER — TELEPHONE (OUTPATIENT)
Dept: PODIATRY | Age: 85
End: 2020-11-17

## 2020-12-15 RX ORDER — RANOLAZINE 500 MG/1
500 TABLET, EXTENDED RELEASE ORAL 2 TIMES DAILY
Qty: 180 TABLET | Refills: 1 | Status: SHIPPED | OUTPATIENT
Start: 2020-12-15 | End: 2021-01-09 | Stop reason: SDUPTHER

## 2020-12-15 RX ORDER — AMMONIUM LACTATE 12 G/100G
CREAM TOPICAL PRN
Qty: 385 G | Refills: 0 | Status: SHIPPED | OUTPATIENT
Start: 2020-12-15

## 2020-12-21 DIAGNOSIS — E11.43 AUTONOMIC DYSFUNCTION WITH TYPE 2 DIABETES MELLITUS (CMD): Primary | ICD-10-CM

## 2020-12-22 ENCOUNTER — LAB SERVICES (OUTPATIENT)
Dept: LAB | Age: 85
End: 2020-12-22

## 2020-12-22 DIAGNOSIS — E11.43 AUTONOMIC DYSFUNCTION WITH TYPE 2 DIABETES MELLITUS (CMD): ICD-10-CM

## 2020-12-22 LAB
ALBUMIN SERPL-MCNC: 3.4 G/DL (ref 3.6–5.1)
ALBUMIN/GLOB SERPL: 1 {RATIO} (ref 1–2.4)
ALP SERPL-CCNC: 62 UNITS/L (ref 45–117)
ALT SERPL-CCNC: 22 UNITS/L
ANION GAP SERPL CALC-SCNC: 11 MMOL/L (ref 10–20)
AST SERPL-CCNC: 21 UNITS/L
BILIRUB SERPL-MCNC: 0.4 MG/DL (ref 0.2–1)
BUN SERPL-MCNC: 24 MG/DL (ref 6–20)
BUN/CREAT SERPL: 15 (ref 7–25)
CALCIUM SERPL-MCNC: 8.6 MG/DL (ref 8.4–10.2)
CHLORIDE SERPL-SCNC: 106 MMOL/L (ref 98–107)
CO2 SERPL-SCNC: 26 MMOL/L (ref 21–32)
CORTIS AM PEAK SERPL-MCNC: 17.5 MCG/DL (ref 5.2–22.5)
CREAT SERPL-MCNC: 1.65 MG/DL (ref 0.67–1.17)
DEPRECATED RDW RBC: 47.1 FL (ref 39–50)
ERYTHROCYTE [DISTWIDTH] IN BLOOD: 13.5 % (ref 11–15)
FASTING DURATION TIME PATIENT: 0 HOURS
GFR SERPLBLD BASED ON 1.73 SQ M-ARVRAT: 37 ML/MIN/1.73M2
GLOBULIN SER-MCNC: 3.3 G/DL (ref 2–4)
GLUCOSE SERPL-MCNC: 141 MG/DL (ref 65–99)
HBA1C MFR BLD: 10.2 % (ref 4.5–5.6)
HCT VFR BLD CALC: 37.2 % (ref 39–51)
HGB BLD-MCNC: 12 G/DL (ref 13–17)
MCH RBC QN AUTO: 30.4 PG (ref 26–34)
MCHC RBC AUTO-ENTMCNC: 32.3 G/DL (ref 32–36.5)
MCV RBC AUTO: 94.2 FL (ref 78–100)
NRBC BLD MANUAL-RTO: 0 /100 WBC
PLATELET # BLD AUTO: 178 K/MCL (ref 140–450)
POTASSIUM SERPL-SCNC: 4.8 MMOL/L (ref 3.4–5.1)
PROT SERPL-MCNC: 6.7 G/DL (ref 6.4–8.2)
RBC # BLD: 3.95 MIL/MCL (ref 4.5–5.9)
SODIUM SERPL-SCNC: 138 MMOL/L (ref 135–145)
WBC # BLD: 6 K/MCL (ref 4.2–11)

## 2020-12-22 PROCEDURE — 85027 COMPLETE CBC AUTOMATED: CPT | Performed by: CLINICAL MEDICAL LABORATORY

## 2020-12-22 PROCEDURE — 36415 COLL VENOUS BLD VENIPUNCTURE: CPT | Performed by: INTERNAL MEDICINE

## 2020-12-22 PROCEDURE — 83036 HEMOGLOBIN GLYCOSYLATED A1C: CPT | Performed by: CLINICAL MEDICAL LABORATORY

## 2020-12-22 PROCEDURE — 80053 COMPREHEN METABOLIC PANEL: CPT | Performed by: CLINICAL MEDICAL LABORATORY

## 2020-12-22 PROCEDURE — 82533 TOTAL CORTISOL: CPT | Performed by: CLINICAL MEDICAL LABORATORY

## 2020-12-23 ENCOUNTER — OFFICE VISIT (OUTPATIENT)
Dept: PODIATRY | Age: 85
End: 2020-12-23

## 2020-12-23 DIAGNOSIS — E11.621 DIABETIC ULCER OF TOE OF RIGHT FOOT ASSOCIATED WITH TYPE 2 DIABETES MELLITUS, LIMITED TO BREAKDOWN OF SKIN (CMD): ICD-10-CM

## 2020-12-23 DIAGNOSIS — L97.511 DIABETIC ULCER OF TOE OF RIGHT FOOT ASSOCIATED WITH TYPE 2 DIABETES MELLITUS, LIMITED TO BREAKDOWN OF SKIN (CMD): ICD-10-CM

## 2020-12-23 DIAGNOSIS — E11.42 TYPE 2 DIABETES MELLITUS WITH DIABETIC POLYNEUROPATHY, WITHOUT LONG-TERM CURRENT USE OF INSULIN (CMD): ICD-10-CM

## 2020-12-23 DIAGNOSIS — I73.9 PAD (PERIPHERAL ARTERY DISEASE) (CMD): Primary | ICD-10-CM

## 2020-12-23 PROCEDURE — 99213 OFFICE O/P EST LOW 20 MIN: CPT | Performed by: PODIATRIST

## 2020-12-24 ENCOUNTER — IMAGING SERVICES (OUTPATIENT)
Dept: ULTRASOUND IMAGING | Age: 85
End: 2020-12-24
Attending: PODIATRIST

## 2020-12-24 DIAGNOSIS — Z79.4 TYPE 2 DIABETES MELLITUS WITH DIABETIC PERIPHERAL ANGIOPATHY WITHOUT GANGRENE, WITH LONG-TERM CURRENT USE OF INSULIN (CMD): ICD-10-CM

## 2020-12-24 DIAGNOSIS — E11.51 TYPE 2 DIABETES MELLITUS WITH DIABETIC PERIPHERAL ANGIOPATHY WITHOUT GANGRENE, WITH LONG-TERM CURRENT USE OF INSULIN (CMD): ICD-10-CM

## 2020-12-24 DIAGNOSIS — R09.89 DIMINISHED PULSES IN LOWER EXTREMITY: ICD-10-CM

## 2020-12-24 PROCEDURE — 93925 LOWER EXTREMITY STUDY: CPT | Performed by: RADIOLOGY

## 2021-01-07 RX ORDER — ROSUVASTATIN CALCIUM 10 MG/1
TABLET, COATED ORAL
Qty: 270 TABLET | OUTPATIENT
Start: 2021-01-07

## 2021-01-08 ENCOUNTER — PREP FOR CASE (OUTPATIENT)
Dept: CARDIOLOGY | Age: 86
End: 2021-01-08

## 2021-01-08 ENCOUNTER — OFFICE VISIT (OUTPATIENT)
Dept: CARDIOLOGY | Age: 86
End: 2021-01-08

## 2021-01-08 VITALS
HEIGHT: 68 IN | WEIGHT: 176 LBS | DIASTOLIC BLOOD PRESSURE: 54 MMHG | HEART RATE: 74 BPM | SYSTOLIC BLOOD PRESSURE: 110 MMHG | BODY MASS INDEX: 26.67 KG/M2

## 2021-01-08 DIAGNOSIS — Z01.812 PRE-PROCEDURE LAB EXAM: Primary | ICD-10-CM

## 2021-01-08 DIAGNOSIS — I73.9 PAD (PERIPHERAL ARTERY DISEASE) (CMD): Primary | ICD-10-CM

## 2021-01-08 DIAGNOSIS — I73.9 PVD (PERIPHERAL VASCULAR DISEASE) (CMD): Primary | ICD-10-CM

## 2021-01-08 PROCEDURE — 99203 OFFICE O/P NEW LOW 30 MIN: CPT | Performed by: INTERNAL MEDICINE

## 2021-01-08 RX ORDER — TELMISARTAN 20 MG/1
20 TABLET ORAL DAILY
COMMUNITY
End: 2021-01-09 | Stop reason: SDUPTHER

## 2021-01-08 RX ORDER — ROSUVASTATIN CALCIUM 40 MG/1
40 TABLET, COATED ORAL DAILY
COMMUNITY
End: 2021-01-09 | Stop reason: SDUPTHER

## 2021-01-08 ASSESSMENT — ENCOUNTER SYMPTOMS
CHEST TIGHTNESS: 0
HEMATOLOGIC/LYMPHATIC NEGATIVE: 1
ENDOCRINE NEGATIVE: 1
DIZZINESS: 0
NEUROLOGICAL NEGATIVE: 1
UNEXPECTED WEIGHT CHANGE: 0
CONSTITUTIONAL NEGATIVE: 1
ACTIVITY CHANGE: 0
RESPIRATORY NEGATIVE: 1
LIGHT-HEADEDNESS: 0
BRUISES/BLEEDS EASILY: 0
FATIGUE: 0
SHORTNESS OF BREATH: 0

## 2021-01-08 ASSESSMENT — ACTIVITIES OF DAILY LIVING (ADL)
MOBILITY_ASSIST_DEVICES: CANE;FOUR WHEEL WALKER
ADL_SCORE: 12
RECENT_DECLINE_ADL: NO
NEEDS_ASSIST: NO
ADL_SHORT_OF_BREATH: NO
DESCRIBE HOW PAIN IMPACTS YOUR LIFE: MOBILITY;PERSONAL CARE/HOUSEHOLD CHORES;PHYSICAL ACTIVITY
HISTORY OF FALLING IN THE LAST YEAR (PRIOR TO ADMISSION): NO
CHRONIC_PAIN_PRESENT: YES, CHRONIC
SENSORY_SUPPORT_DEVICES: EYEGLASSES
ADL_BEFORE_ADMISSION: INDEPENDENT

## 2021-01-08 ASSESSMENT — COGNITIVE AND FUNCTIONAL STATUS - GENERAL
ARE YOU DEAF OR DO YOU HAVE SERIOUS DIFFICULTY  HEARING: NO
ARE YOU BLIND OR DO YOU HAVE SERIOUS DIFFICULTY SEEING, EVEN WHEN WEARING GLASSES: NO

## 2021-01-09 ENCOUNTER — LAB SERVICES (OUTPATIENT)
Dept: LAB | Age: 86
End: 2021-01-09

## 2021-01-09 DIAGNOSIS — Z01.812 PRE-PROCEDURE LAB EXAM: ICD-10-CM

## 2021-01-09 PROCEDURE — U0005 INFEC AGEN DETEC AMPLI PROBE: HCPCS | Performed by: CLINICAL MEDICAL LABORATORY

## 2021-01-09 PROCEDURE — U0003 INFECTIOUS AGENT DETECTION BY NUCLEIC ACID (DNA OR RNA); SEVERE ACUTE RESPIRATORY SYNDROME CORONAVIRUS 2 (SARS-COV-2) (CORONAVIRUS DISEASE [COVID-19]), AMPLIFIED PROBE TECHNIQUE, MAKING USE OF HIGH THROUGHPUT TECHNOLOGIES AS DESCRIBED BY CMS-2020-01-R: HCPCS | Performed by: CLINICAL MEDICAL LABORATORY

## 2021-01-09 RX ORDER — CLOPIDOGREL BISULFATE 75 MG/1
75 TABLET ORAL DAILY
Qty: 90 TABLET | Refills: 1 | Status: SHIPPED | OUTPATIENT
Start: 2021-01-09 | End: 2021-04-15 | Stop reason: SDUPTHER

## 2021-01-09 RX ORDER — ROSUVASTATIN CALCIUM 40 MG/1
40 TABLET, COATED ORAL DAILY
Qty: 90 TABLET | Refills: 1 | Status: SHIPPED | OUTPATIENT
Start: 2021-01-09 | End: 2021-06-25

## 2021-01-09 RX ORDER — RANOLAZINE 500 MG/1
500 TABLET, EXTENDED RELEASE ORAL 2 TIMES DAILY
Qty: 180 TABLET | Refills: 1 | Status: SHIPPED | OUTPATIENT
Start: 2021-01-09 | End: 2021-01-14 | Stop reason: SDUPTHER

## 2021-01-09 RX ORDER — TELMISARTAN 20 MG/1
20 TABLET ORAL DAILY
Qty: 90 TABLET | Refills: 1 | Status: SHIPPED | OUTPATIENT
Start: 2021-01-09 | End: 2021-06-25

## 2021-01-10 LAB
SARS-COV-2 RNA RESP QL NAA+PROBE: NOT DETECTED
SERVICE CMNT-IMP: NORMAL
SERVICE CMNT-IMP: NORMAL

## 2021-01-11 ENCOUNTER — HOSPITAL ENCOUNTER (OUTPATIENT)
Age: 86
Discharge: HOME | End: 2021-01-11
Attending: INTERNAL MEDICINE | Admitting: INTERNAL MEDICINE

## 2021-01-11 VITALS
DIASTOLIC BLOOD PRESSURE: 67 MMHG | HEIGHT: 68 IN | OXYGEN SATURATION: 98 % | WEIGHT: 171.52 LBS | SYSTOLIC BLOOD PRESSURE: 159 MMHG | BODY MASS INDEX: 25.99 KG/M2 | HEART RATE: 61 BPM | RESPIRATION RATE: 18 BRPM

## 2021-01-11 DIAGNOSIS — I73.9 PVD (PERIPHERAL VASCULAR DISEASE) (CMD): ICD-10-CM

## 2021-01-11 LAB
ACTIVATED CLOTTING TIME LOW RANGE - POINT OF CARE: >400 SEC
ACTIVATED CLOTTING TIME LOW RANGE - POINT OF CARE: >400 SEC
ATRIAL RATE (BPM): 64
CHOLEST SERPL-MCNC: 129 MG/DL
CHOLEST/HDLC SERPL: 2.9 {RATIO}
FASTING DURATION TIME PATIENT: ABNORMAL H
HDLC SERPL-MCNC: 45 MG/DL
LDLC SERPL CALC-MCNC: 50 MG/DL
NONHDLC SERPL-MCNC: 84 MG/DL
P AXIS (DEGREES): 36
PR-INTERVAL (MSEC): 240
QRS-INTERVAL (MSEC): 142
QT-INTERVAL (MSEC): 420
QTC: 433
R AXIS (DEGREES): 1
REPORT TEXT: NORMAL
T AXIS (DEGREES): 65
TRIGL SERPL-MCNC: 172 MG/DL
VENTRICULAR RATE EKG/MIN (BPM): 64

## 2021-01-11 PROCEDURE — 10002801 HB RX 250 W/O HCPCS: Performed by: INTERNAL MEDICINE

## 2021-01-11 PROCEDURE — 10002805 HB CONTRAST AGENT: Performed by: INTERNAL MEDICINE

## 2021-01-11 PROCEDURE — 99152 MOD SED SAME PHYS/QHP 5/>YRS: CPT | Performed by: INTERNAL MEDICINE

## 2021-01-11 PROCEDURE — C1894 INTRO/SHEATH, NON-LASER: HCPCS | Performed by: INTERNAL MEDICINE

## 2021-01-11 PROCEDURE — 10004351 HB COUNTER-CATH LAB CASE: Performed by: INTERNAL MEDICINE

## 2021-01-11 PROCEDURE — 10006023 HB SUPPLY 272: Performed by: INTERNAL MEDICINE

## 2021-01-11 PROCEDURE — 10004159 HB COUNTER-POST PROCEDURE-DISCHARGE: Performed by: INTERNAL MEDICINE

## 2021-01-11 PROCEDURE — 10002767 HB EXTENDED RECOVERY PER HOUR: Performed by: INTERNAL MEDICINE

## 2021-01-11 PROCEDURE — 10004402 HB ULTRASOUND S&I: Performed by: INTERNAL MEDICINE

## 2021-01-11 PROCEDURE — 85347 COAGULATION TIME ACTIVATED: CPT

## 2021-01-11 PROCEDURE — 36415 COLL VENOUS BLD VENIPUNCTURE: CPT | Performed by: INTERNAL MEDICINE

## 2021-01-11 PROCEDURE — 10004358 HB PERIPHERAL INTERVENTION LEVEL 1: Performed by: INTERNAL MEDICINE

## 2021-01-11 PROCEDURE — 10004160 HB COUNTER-PRE PROC ENCOUNTER: Performed by: INTERNAL MEDICINE

## 2021-01-11 PROCEDURE — 75710 ARTERY X-RAYS ARM/LEG: CPT | Performed by: INTERNAL MEDICINE

## 2021-01-11 PROCEDURE — 10006027 HB SUPPLY 278: Performed by: INTERNAL MEDICINE

## 2021-01-11 PROCEDURE — 10004651 HB RX, NO CHARGE ITEM: Performed by: NURSE PRACTITIONER

## 2021-01-11 PROCEDURE — 76937 US GUIDE VASCULAR ACCESS: CPT | Performed by: INTERNAL MEDICINE

## 2021-01-11 PROCEDURE — C1769 GUIDE WIRE: HCPCS | Performed by: INTERNAL MEDICINE

## 2021-01-11 PROCEDURE — 10002800 HB RX 250 W HCPCS: Performed by: INTERNAL MEDICINE

## 2021-01-11 PROCEDURE — 37228 REVASCULARIZATION ENDOVASC TIBIAL/PERONEAL UNIL W ANGIOPLASTY: CPT | Performed by: INTERNAL MEDICINE

## 2021-01-11 PROCEDURE — 80061 LIPID PANEL: CPT | Performed by: NURSE PRACTITIONER

## 2021-01-11 PROCEDURE — 93005 ELECTROCARDIOGRAM TRACING: CPT | Performed by: NURSE PRACTITIONER

## 2021-01-11 PROCEDURE — 93010 ELECTROCARDIOGRAM REPORT: CPT | Performed by: INTERNAL MEDICINE

## 2021-01-11 PROCEDURE — C1760 CLOSURE DEV, VASC: HCPCS | Performed by: INTERNAL MEDICINE

## 2021-01-11 PROCEDURE — 10004394 HB RADIOLOGY S&I LEVEL 3: Performed by: INTERNAL MEDICINE

## 2021-01-11 PROCEDURE — 10004185 HB COUNTER-VISIT  CENSUS: Performed by: INTERNAL MEDICINE

## 2021-01-11 PROCEDURE — C1725 CATH, TRANSLUMIN NON-LASER: HCPCS | Performed by: INTERNAL MEDICINE

## 2021-01-11 PROCEDURE — C1887 CATHETER, GUIDING: HCPCS | Performed by: INTERNAL MEDICINE

## 2021-01-11 PROCEDURE — 10002807 HB RX 258: Performed by: NURSE PRACTITIONER

## 2021-01-11 DEVICE — PERCLOSE PROGLIDE™ SUTURE-MEDIATED CLOSURE SYSTEM
Type: IMPLANTABLE DEVICE | Site: FEMORAL ARTERY | Status: FUNCTIONAL
Brand: PERCLOSE PROGLIDE™

## 2021-01-11 RX ORDER — 0.9 % SODIUM CHLORIDE 0.9 %
2 VIAL (ML) INJECTION EVERY 12 HOURS SCHEDULED
Status: DISCONTINUED | OUTPATIENT
Start: 2021-01-11 | End: 2021-01-11 | Stop reason: HOSPADM

## 2021-01-11 RX ORDER — ASPIRIN 81 MG/1
81 TABLET, CHEWABLE ORAL DAILY
Status: DISCONTINUED | OUTPATIENT
Start: 2021-01-12 | End: 2021-01-11 | Stop reason: HOSPADM

## 2021-01-11 RX ORDER — HYDRALAZINE HYDROCHLORIDE 20 MG/ML
10 INJECTION INTRAMUSCULAR; INTRAVENOUS EVERY 4 HOURS PRN
Status: DISCONTINUED | OUTPATIENT
Start: 2021-01-11 | End: 2021-01-11 | Stop reason: HOSPADM

## 2021-01-11 RX ORDER — HEPARIN SODIUM 1000 [USP'U]/ML
INJECTION, SOLUTION INTRAVENOUS; SUBCUTANEOUS PRN
Status: DISCONTINUED | OUTPATIENT
Start: 2021-01-11 | End: 2021-01-11 | Stop reason: HOSPADM

## 2021-01-11 RX ORDER — SODIUM CHLORIDE 9 MG/ML
INJECTION, SOLUTION INTRAVENOUS CONTINUOUS
Status: ACTIVE | OUTPATIENT
Start: 2021-01-11 | End: 2021-01-11

## 2021-01-11 RX ORDER — ASPIRIN 325 MG
325 TABLET ORAL ONCE
Status: COMPLETED | OUTPATIENT
Start: 2021-01-11 | End: 2021-01-11

## 2021-01-11 RX ORDER — CLONIDINE HYDROCHLORIDE 0.1 MG/1
0.1 TABLET ORAL EVERY 4 HOURS PRN
Status: DISCONTINUED | OUTPATIENT
Start: 2021-01-11 | End: 2021-01-11 | Stop reason: HOSPADM

## 2021-01-11 RX ORDER — HEPARIN SODIUM 200 [USP'U]/100ML
INJECTION, SOLUTION INTRAVENOUS PRN
Status: DISCONTINUED | OUTPATIENT
Start: 2021-01-11 | End: 2021-01-11 | Stop reason: HOSPADM

## 2021-01-11 RX ORDER — LIDOCAINE HYDROCHLORIDE AND EPINEPHRINE 10; 10 MG/ML; UG/ML
INJECTION, SOLUTION INFILTRATION; PERINEURAL PRN
Status: DISCONTINUED | OUTPATIENT
Start: 2021-01-11 | End: 2021-01-11 | Stop reason: HOSPADM

## 2021-01-11 RX ORDER — VERAPAMIL HYDROCHLORIDE 2.5 MG/ML
INJECTION, SOLUTION INTRAVENOUS PRN
Status: DISCONTINUED | OUTPATIENT
Start: 2021-01-11 | End: 2021-01-11 | Stop reason: HOSPADM

## 2021-01-11 RX ORDER — SODIUM CHLORIDE 9 MG/ML
INJECTION, SOLUTION INTRAVENOUS CONTINUOUS
Status: DISCONTINUED | OUTPATIENT
Start: 2021-01-11 | End: 2021-01-11 | Stop reason: HOSPADM

## 2021-01-11 RX ORDER — LIDOCAINE HYDROCHLORIDE 20 MG/ML
INJECTION, SOLUTION EPIDURAL; INFILTRATION; INTRACAUDAL; PERINEURAL PRN
Status: DISCONTINUED | OUTPATIENT
Start: 2021-01-11 | End: 2021-01-11 | Stop reason: HOSPADM

## 2021-01-11 RX ORDER — CLOPIDOGREL BISULFATE 75 MG/1
75 TABLET ORAL DAILY
Status: DISCONTINUED | OUTPATIENT
Start: 2021-01-12 | End: 2021-01-11 | Stop reason: HOSPADM

## 2021-01-11 RX ORDER — MIDAZOLAM HYDROCHLORIDE 1 MG/ML
INJECTION, SOLUTION INTRAMUSCULAR; INTRAVENOUS PRN
Status: DISCONTINUED | OUTPATIENT
Start: 2021-01-11 | End: 2021-01-11 | Stop reason: HOSPADM

## 2021-01-11 RX ADMIN — SODIUM CHLORIDE: 9 INJECTION, SOLUTION INTRAVENOUS at 15:53

## 2021-01-11 RX ADMIN — ASPIRIN 325 MG ORAL TABLET 325 MG: 325 PILL ORAL at 14:52

## 2021-01-11 ASSESSMENT — PAIN SCALES - GENERAL
PAINLEVEL_OUTOF10: 0

## 2021-01-11 ASSESSMENT — LIFESTYLE VARIABLES: SMOKING_HISTORY: YES

## 2021-01-11 ASSESSMENT — NEW YORK HEART ASSOCIATION (NYHA) CLASSIFICATION: NYHA FUNCTIONAL CLASS: NOT CLASS IV

## 2021-01-13 ENCOUNTER — LAB SERVICES (OUTPATIENT)
Dept: LAB | Age: 86
End: 2021-01-13

## 2021-01-13 DIAGNOSIS — I73.9 PVD (PERIPHERAL VASCULAR DISEASE) (CMD): ICD-10-CM

## 2021-01-13 LAB
ANION GAP SERPL CALC-SCNC: 11 MMOL/L (ref 10–20)
BUN SERPL-MCNC: 36 MG/DL (ref 6–20)
BUN/CREAT SERPL: 17 (ref 7–25)
CALCIUM SERPL-MCNC: 9.3 MG/DL (ref 8.4–10.2)
CHLORIDE SERPL-SCNC: 102 MMOL/L (ref 98–107)
CO2 SERPL-SCNC: 29 MMOL/L (ref 21–32)
CREAT SERPL-MCNC: 2.08 MG/DL (ref 0.67–1.17)
FASTING DURATION TIME PATIENT: ABNORMAL H
GFR SERPLBLD BASED ON 1.73 SQ M-ARVRAT: 28 ML/MIN/1.73M2
GLUCOSE SERPL-MCNC: 233 MG/DL (ref 65–99)
POTASSIUM SERPL-SCNC: 4.5 MMOL/L (ref 3.4–5.1)
SODIUM SERPL-SCNC: 137 MMOL/L (ref 135–145)

## 2021-01-13 PROCEDURE — 80048 BASIC METABOLIC PNL TOTAL CA: CPT | Performed by: CLINICAL MEDICAL LABORATORY

## 2021-01-13 PROCEDURE — 36415 COLL VENOUS BLD VENIPUNCTURE: CPT | Performed by: INTERNAL MEDICINE

## 2021-01-14 ENCOUNTER — TELEPHONE (OUTPATIENT)
Dept: CARDIOLOGY | Age: 86
End: 2021-01-14

## 2021-01-14 DIAGNOSIS — R79.89 BLOOD CREATININE INCREASED COMPARED WITH PRIOR MEASUREMENT: Primary | ICD-10-CM

## 2021-01-14 RX ORDER — RANOLAZINE 500 MG/1
500 TABLET, EXTENDED RELEASE ORAL 2 TIMES DAILY
Qty: 180 TABLET | Refills: 1 | Status: SHIPPED | OUTPATIENT
Start: 2021-01-14

## 2021-01-18 ENCOUNTER — LAB SERVICES (OUTPATIENT)
Dept: LAB | Age: 86
End: 2021-01-18

## 2021-01-18 DIAGNOSIS — R79.89 BLOOD CREATININE INCREASED COMPARED WITH PRIOR MEASUREMENT: ICD-10-CM

## 2021-01-18 PROCEDURE — 80048 BASIC METABOLIC PNL TOTAL CA: CPT | Performed by: CLINICAL MEDICAL LABORATORY

## 2021-01-18 PROCEDURE — 36415 COLL VENOUS BLD VENIPUNCTURE: CPT | Performed by: INTERNAL MEDICINE

## 2021-01-19 LAB
ANION GAP SERPL CALC-SCNC: 9 MMOL/L (ref 10–20)
BUN SERPL-MCNC: 30 MG/DL (ref 6–20)
BUN/CREAT SERPL: 17 (ref 7–25)
CALCIUM SERPL-MCNC: 9.3 MG/DL (ref 8.4–10.2)
CHLORIDE SERPL-SCNC: 100 MMOL/L (ref 98–107)
CO2 SERPL-SCNC: 31 MMOL/L (ref 21–32)
CREAT SERPL-MCNC: 1.78 MG/DL (ref 0.67–1.17)
FASTING DURATION TIME PATIENT: 0 HOURS
GFR SERPLBLD BASED ON 1.73 SQ M-ARVRAT: 34 ML/MIN/1.73M2
GLUCOSE SERPL-MCNC: 260 MG/DL (ref 65–99)
POTASSIUM SERPL-SCNC: 4.7 MMOL/L (ref 3.4–5.1)
SODIUM SERPL-SCNC: 135 MMOL/L (ref 135–145)

## 2021-01-20 ENCOUNTER — TELEPHONE (OUTPATIENT)
Dept: CARDIOLOGY | Age: 86
End: 2021-01-20

## 2021-01-21 NOTE — H&P ADULT - PROBLEM SELECTOR PROBLEM 3
DATE OF ADMISSION:  01/10/2021     DISCHARGE DIAGNOSES:    1.  Acute subdural hematoma secondary to ground-level fall, holohemispheric on   the left side.  2.  History of longstanding persistent atrial fibrillation.  3.  Not ____ status.  4.  Hyperactive delirium, multi-etiology.  5.  History of essential hypertension.  6.  Acute urinary retention.  7.  History of stroke.  8.  History of cancer, chronic lymphocytic leukemia, and prostate cancer.     OPERATIONS AND PROCEDURES PERFORMED ON THIS ADMISSION:  1.  01/11/2021, craniotomy with evacuation of subdural hematoma.  2.  01/20/2021, gastrostomy tube placement.     DISCHARGE MEDICATIONS:  Include amlodipine 10 mg per day; Lanoxin 125 mcg   Tuesdays, Thursdays, Saturdays, and Sundays; Lanoxin 250 mcg Monday,   Wednesday, and Friday; Colace 100 mg twice daily; lisinopril 5 mg per day;   metoprolol 100 mg twice daily.     The patient is receiving tube feedings with Impact Peptide 1.5 at 45 mL per   hour.     DISCHARGE SUMMARY:  The patient is a 89-year-old male with history of stroke.    He suffered a ground-level fall.  He has a history of atrial fibrillation and   had been maintained on Eliquis.  He did have coagulopathy.  He developed a   large left holohemispheric subdural hematoma.  The Eliquis was reversed at the   time of admission.  The patient was placed in ICU where there was neurologic   deterioration.  He subsequently was taken for emergency craniotomy on the   left.  He subsequently has improved, is easily arousable, follows commands,   but has incomprehensible speech.  The patient has some left-sided weakness.    The patient did have significant hypertension, which was treated.  His atrial   fibrillation was monitored and he received medication.  His Eliquis was not   felt to be indicated and was discontinued.  The patient did have a gastrostomy   tube.  Currently, his wounds are healing.  He has staples in the left eyebrow   and scalp, which can be  removed in 1 week.  The plan is for discharge from   the hospital to hospice in the home.  The patient does have a delirium from   which he is apparently improving.  Overall, long-term prognosis is poor.    Interested readers refer to the medical record for more detailed information   regarding hospital course.        ______________________________  MD MARK PICKETT/JONATAN/MANI    DD:  01/21/2021 12:03  DT:  01/21/2021 12:56    Job#:  [id number]   Hyperlipemia Car

## 2021-01-26 DIAGNOSIS — Z79.4 TYPE 2 DIABETES MELLITUS WITH DIABETIC NEPHROPATHY, WITH LONG-TERM CURRENT USE OF INSULIN (CMD): Primary | ICD-10-CM

## 2021-01-26 DIAGNOSIS — E11.21 TYPE 2 DIABETES MELLITUS WITH DIABETIC NEPHROPATHY, WITH LONG-TERM CURRENT USE OF INSULIN (CMD): Primary | ICD-10-CM

## 2021-02-08 ENCOUNTER — OFFICE VISIT (OUTPATIENT)
Dept: PODIATRY | Age: 86
End: 2021-02-08

## 2021-02-08 DIAGNOSIS — E11.51 TYPE 2 DIABETES MELLITUS WITH DIABETIC PERIPHERAL ANGIOPATHY WITHOUT GANGRENE, WITH LONG-TERM CURRENT USE OF INSULIN (CMD): ICD-10-CM

## 2021-02-08 DIAGNOSIS — Z79.4 TYPE 2 DIABETES MELLITUS WITH DIABETIC PERIPHERAL ANGIOPATHY WITHOUT GANGRENE, WITH LONG-TERM CURRENT USE OF INSULIN (CMD): ICD-10-CM

## 2021-02-08 DIAGNOSIS — B35.1 DERMATOPHYTOSIS OF NAIL: Primary | ICD-10-CM

## 2021-02-08 PROCEDURE — 11721 DEBRIDE NAIL 6 OR MORE: CPT | Performed by: PODIATRIST

## 2021-02-08 RX ORDER — GABAPENTIN 400 MG/1
400 CAPSULE ORAL 2 TIMES DAILY
Qty: 180 CAPSULE | Refills: 3 | Status: SHIPPED | OUTPATIENT
Start: 2021-02-08

## 2021-04-15 RX ORDER — CLOPIDOGREL BISULFATE 75 MG/1
75 TABLET ORAL DAILY
Qty: 90 TABLET | Refills: 1 | Status: SHIPPED | OUTPATIENT
Start: 2021-04-15

## 2021-04-22 ENCOUNTER — IMAGING SERVICES (OUTPATIENT)
Dept: GENERAL RADIOLOGY | Age: 86
End: 2021-04-22

## 2021-04-22 ENCOUNTER — APPOINTMENT (OUTPATIENT)
Dept: PODIATRY | Age: 86
End: 2021-04-22

## 2021-04-22 ENCOUNTER — LAB SERVICES (OUTPATIENT)
Dept: LAB | Age: 86
End: 2021-04-22

## 2021-04-22 DIAGNOSIS — M25.50 POLYARTHRALGIA: ICD-10-CM

## 2021-04-22 DIAGNOSIS — M79.642 PAIN OF LEFT HAND: Primary | ICD-10-CM

## 2021-04-22 DIAGNOSIS — N18.4 STAGE 4 CHRONIC KIDNEY DISEASE (CMD): ICD-10-CM

## 2021-04-22 DIAGNOSIS — M79.89 SWELLING OF LEFT HAND: ICD-10-CM

## 2021-04-22 DIAGNOSIS — M79.642 LEFT HAND PAIN: ICD-10-CM

## 2021-04-22 DIAGNOSIS — M79.642 PAIN OF LEFT HAND: ICD-10-CM

## 2021-04-22 PROCEDURE — 73130 X-RAY EXAM OF HAND: CPT | Performed by: RADIOLOGY

## 2021-04-22 PROCEDURE — 83735 ASSAY OF MAGNESIUM: CPT | Performed by: CLINICAL MEDICAL LABORATORY

## 2021-04-22 PROCEDURE — 82310 ASSAY OF CALCIUM: CPT | Performed by: CLINICAL MEDICAL LABORATORY

## 2021-04-22 PROCEDURE — 86200 CCP ANTIBODY: CPT | Performed by: CLINICAL MEDICAL LABORATORY

## 2021-04-22 PROCEDURE — 84550 ASSAY OF BLOOD/URIC ACID: CPT | Performed by: CLINICAL MEDICAL LABORATORY

## 2021-04-22 PROCEDURE — 86140 C-REACTIVE PROTEIN: CPT | Performed by: CLINICAL MEDICAL LABORATORY

## 2021-04-22 PROCEDURE — 83970 ASSAY OF PARATHORMONE: CPT | Performed by: CLINICAL MEDICAL LABORATORY

## 2021-04-22 PROCEDURE — 85652 RBC SED RATE AUTOMATED: CPT | Performed by: CLINICAL MEDICAL LABORATORY

## 2021-04-22 PROCEDURE — 82565 ASSAY OF CREATININE: CPT | Performed by: CLINICAL MEDICAL LABORATORY

## 2021-04-22 PROCEDURE — 86431 RHEUMATOID FACTOR QUANT: CPT | Performed by: CLINICAL MEDICAL LABORATORY

## 2021-04-22 PROCEDURE — 82306 VITAMIN D 25 HYDROXY: CPT | Performed by: CLINICAL MEDICAL LABORATORY

## 2021-04-22 PROCEDURE — 36415 COLL VENOUS BLD VENIPUNCTURE: CPT | Performed by: INTERNAL MEDICINE

## 2021-04-23 LAB
CALCIUM SERPL-MCNC: 8.9 MG/DL (ref 8.4–10.2)
CCP AB SER IA-ACNC: 6 UNITS
CREAT SERPL-MCNC: 2.19 MG/DL (ref 0.67–1.17)
CRP SERPL-MCNC: <0.3 MG/DL
ERYTHROCYTE [SEDIMENTATION RATE] IN BLOOD BY WESTERGREN METHOD: 10 MM/HR (ref 0–20)
GFR SERPLBLD BASED ON 1.73 SQ M-ARVRAT: 26 ML/MIN/1.73M2
MAGNESIUM SERPL-MCNC: 2.5 MG/DL (ref 1.7–2.4)
PTH-INTACT SERPL-MCNC: 167 PG/ML (ref 19–88)
RHEUMATOID FACT SER NEPH-ACNC: <10 UNITS/ML
URATE SERPL-MCNC: 5.8 MG/DL (ref 3.5–7.2)

## 2021-04-26 ENCOUNTER — OFFICE VISIT (OUTPATIENT)
Dept: PODIATRY | Age: 86
End: 2021-04-26

## 2021-04-26 DIAGNOSIS — Z79.4 TYPE 2 DIABETES MELLITUS WITH DIABETIC PERIPHERAL ANGIOPATHY WITHOUT GANGRENE, WITH LONG-TERM CURRENT USE OF INSULIN (CMD): ICD-10-CM

## 2021-04-26 DIAGNOSIS — B35.1 DERMATOPHYTOSIS OF NAIL: Primary | ICD-10-CM

## 2021-04-26 DIAGNOSIS — E11.51 TYPE 2 DIABETES MELLITUS WITH DIABETIC PERIPHERAL ANGIOPATHY WITHOUT GANGRENE, WITH LONG-TERM CURRENT USE OF INSULIN (CMD): ICD-10-CM

## 2021-04-26 PROCEDURE — 11721 DEBRIDE NAIL 6 OR MORE: CPT | Performed by: PODIATRIST

## 2021-04-27 ENCOUNTER — LAB SERVICES (OUTPATIENT)
Dept: LAB | Age: 86
End: 2021-04-27

## 2021-04-27 DIAGNOSIS — M79.89 SWELLING OF LEFT HAND: ICD-10-CM

## 2021-04-27 DIAGNOSIS — M79.642 PAIN OF LEFT HAND: ICD-10-CM

## 2021-04-27 LAB
APPEARANCE UR: CLEAR
BILIRUB UR QL STRIP: NEGATIVE
COLOR UR: YELLOW
GLUCOSE UR STRIP-MCNC: 150 MG/DL
HGB UR QL STRIP: NEGATIVE
KETONES UR STRIP-MCNC: NEGATIVE MG/DL
LEUKOCYTE ESTERASE UR QL STRIP: NEGATIVE
NITRITE UR QL STRIP: NEGATIVE
PH UR STRIP: 6 [PH] (ref 5–7)
PROT UR STRIP-MCNC: NEGATIVE MG/DL
SP GR UR STRIP: 1.01 (ref 1–1.03)
UROBILINOGEN UR STRIP-MCNC: 0.2 MG/DL

## 2021-04-27 PROCEDURE — 81003 URINALYSIS AUTO W/O SCOPE: CPT | Performed by: CLINICAL MEDICAL LABORATORY

## 2021-04-29 DIAGNOSIS — M25.50 POLYARTHRALGIA: ICD-10-CM

## 2021-04-29 DIAGNOSIS — N18.4 STAGE 4 CHRONIC KIDNEY DISEASE (CMD): Primary | ICD-10-CM

## 2021-04-29 LAB — 25(OH)D3+25(OH)D2 SERPL-MCNC: 23.9 NG/ML (ref 30–100)

## 2021-04-30 RX ORDER — PREDNISONE 5 MG/1
TABLET ORAL
Qty: 14 TABLET | Refills: 0 | Status: SHIPPED | OUTPATIENT
Start: 2021-04-30

## 2021-04-30 RX ORDER — COLCHICINE 0.6 MG/1
0.6 TABLET ORAL DAILY
Qty: 3 TABLET | Refills: 0 | Status: CANCELLED | OUTPATIENT
Start: 2021-04-30

## 2021-05-03 ENCOUNTER — TELEPHONE (OUTPATIENT)
Dept: RHEUMATOLOGY | Age: 86
End: 2021-05-03

## 2021-05-03 DIAGNOSIS — R79.89 LOW VITAMIN D LEVEL: ICD-10-CM

## 2021-05-03 DIAGNOSIS — N18.9 CHRONIC KIDNEY DISEASE, UNSPECIFIED CKD STAGE: Primary | ICD-10-CM

## 2021-05-07 ENCOUNTER — HOSPITAL ENCOUNTER (OUTPATIENT)
Dept: PAIN MANAGEMENT | Age: 86
Discharge: HOME | End: 2021-05-07
Attending: PAIN MEDICINE

## 2021-05-07 VITALS
DIASTOLIC BLOOD PRESSURE: 77 MMHG | RESPIRATION RATE: 16 BRPM | SYSTOLIC BLOOD PRESSURE: 193 MMHG | HEART RATE: 63 BPM | OXYGEN SATURATION: 98 %

## 2021-05-07 PROCEDURE — 10004187 HB COUNTER-VISIT CENSUS, OUTPATIENT: Performed by: PAIN MEDICINE

## 2021-05-07 PROCEDURE — 99212 OFFICE O/P EST SF 10 MIN: CPT | Performed by: PAIN MEDICINE

## 2021-05-07 RX ORDER — HYDROCODONE BITARTRATE AND ACETAMINOPHEN 5; 325 MG/1; MG/1
1 TABLET ORAL 4 TIMES DAILY PRN
Qty: 40 TABLET | Refills: 0 | Status: SHIPPED | OUTPATIENT
Start: 2021-05-07

## 2021-05-07 ASSESSMENT — ENCOUNTER SYMPTOMS
NEUROLOGICAL NEGATIVE: 1
FEVER: 0
RESPIRATORY NEGATIVE: 1
CHILLS: 0
EYES NEGATIVE: 1
ACTIVITY CHANGE: 1
BRUISES/BLEEDS EASILY: 1
COLOR CHANGE: 1
GASTROINTESTINAL NEGATIVE: 1
ENDOCRINE NEGATIVE: 1
FATIGUE: 1
PSYCHIATRIC NEGATIVE: 1

## 2021-06-25 RX ORDER — ROSUVASTATIN CALCIUM 40 MG/1
TABLET, COATED ORAL
Qty: 90 TABLET | Refills: 1 | Status: SHIPPED | OUTPATIENT
Start: 2021-06-25

## 2021-06-25 RX ORDER — TELMISARTAN 20 MG/1
TABLET ORAL
Qty: 90 TABLET | Refills: 1 | Status: SHIPPED | OUTPATIENT
Start: 2021-06-25

## 2022-02-26 ENCOUNTER — INPATIENT (INPATIENT)
Facility: HOSPITAL | Age: 87
LOS: 6 days | Discharge: NURSING, REHAB OR LONG-TERM CARE | End: 2022-03-05
Attending: STUDENT IN AN ORGANIZED HEALTH CARE EDUCATION/TRAINING PROGRAM | Admitting: STUDENT IN AN ORGANIZED HEALTH CARE EDUCATION/TRAINING PROGRAM
Payer: MEDICARE

## 2022-02-26 VITALS
DIASTOLIC BLOOD PRESSURE: 49 MMHG | OXYGEN SATURATION: 100 % | SYSTOLIC BLOOD PRESSURE: 109 MMHG | TEMPERATURE: 98 F | HEIGHT: 68 IN | HEART RATE: 76 BPM | RESPIRATION RATE: 16 BRPM

## 2022-02-26 DIAGNOSIS — R07.9 CHEST PAIN, UNSPECIFIED: ICD-10-CM

## 2022-02-26 DIAGNOSIS — Z95.5 PRESENCE OF CORONARY ANGIOPLASTY IMPLANT AND GRAFT: Chronic | ICD-10-CM

## 2022-02-26 DIAGNOSIS — Z98.89 OTHER SPECIFIED POSTPROCEDURAL STATES: Chronic | ICD-10-CM

## 2022-02-26 LAB
ALBUMIN SERPL ELPH-MCNC: 4.5 G/DL — SIGNIFICANT CHANGE UP (ref 3.3–5)
ALP SERPL-CCNC: 65 U/L — SIGNIFICANT CHANGE UP (ref 40–120)
ALT FLD-CCNC: 21 U/L — SIGNIFICANT CHANGE UP (ref 4–41)
ANION GAP SERPL CALC-SCNC: 10 MMOL/L — SIGNIFICANT CHANGE UP (ref 7–14)
APPEARANCE UR: CLEAR — SIGNIFICANT CHANGE UP
APTT BLD: 30.1 SEC — SIGNIFICANT CHANGE UP (ref 27–36.3)
AST SERPL-CCNC: 23 U/L — SIGNIFICANT CHANGE UP (ref 4–40)
BASOPHILS # BLD AUTO: 0.04 K/UL — SIGNIFICANT CHANGE UP (ref 0–0.2)
BASOPHILS NFR BLD AUTO: 0.5 % — SIGNIFICANT CHANGE UP (ref 0–2)
BILIRUB SERPL-MCNC: 0.4 MG/DL — SIGNIFICANT CHANGE UP (ref 0.2–1.2)
BILIRUB UR-MCNC: NEGATIVE — SIGNIFICANT CHANGE UP
BUN SERPL-MCNC: 38 MG/DL — HIGH (ref 7–23)
CALCIUM SERPL-MCNC: 9.3 MG/DL — SIGNIFICANT CHANGE UP (ref 8.4–10.5)
CHLORIDE SERPL-SCNC: 106 MMOL/L — SIGNIFICANT CHANGE UP (ref 98–107)
CO2 SERPL-SCNC: 23 MMOL/L — SIGNIFICANT CHANGE UP (ref 22–31)
COLOR SPEC: COLORLESS — SIGNIFICANT CHANGE UP
CREAT SERPL-MCNC: 2.07 MG/DL — HIGH (ref 0.5–1.3)
DIFF PNL FLD: ABNORMAL
EOSINOPHIL # BLD AUTO: 0.14 K/UL — SIGNIFICANT CHANGE UP (ref 0–0.5)
EOSINOPHIL NFR BLD AUTO: 1.9 % — SIGNIFICANT CHANGE UP (ref 0–6)
FLUAV AG NPH QL: SIGNIFICANT CHANGE UP
FLUBV AG NPH QL: SIGNIFICANT CHANGE UP
GLUCOSE BLDC GLUCOMTR-MCNC: 199 MG/DL — HIGH (ref 70–99)
GLUCOSE BLDC GLUCOMTR-MCNC: 74 MG/DL — SIGNIFICANT CHANGE UP (ref 70–99)
GLUCOSE SERPL-MCNC: 233 MG/DL — HIGH (ref 70–99)
GLUCOSE UR QL: ABNORMAL
HCT VFR BLD CALC: 38.3 % — LOW (ref 39–50)
HGB BLD-MCNC: 12.3 G/DL — LOW (ref 13–17)
IANC: 5.35 K/UL — SIGNIFICANT CHANGE UP (ref 1.5–8.5)
IMM GRANULOCYTES NFR BLD AUTO: 0.4 % — SIGNIFICANT CHANGE UP (ref 0–1.5)
INR BLD: 1.04 RATIO — SIGNIFICANT CHANGE UP (ref 0.88–1.16)
KETONES UR-MCNC: NEGATIVE — SIGNIFICANT CHANGE UP
LEUKOCYTE ESTERASE UR-ACNC: NEGATIVE — SIGNIFICANT CHANGE UP
LYMPHOCYTES # BLD AUTO: 1.39 K/UL — SIGNIFICANT CHANGE UP (ref 1–3.3)
LYMPHOCYTES # BLD AUTO: 18.5 % — SIGNIFICANT CHANGE UP (ref 13–44)
MCHC RBC-ENTMCNC: 29.4 PG — SIGNIFICANT CHANGE UP (ref 27–34)
MCHC RBC-ENTMCNC: 32.1 GM/DL — SIGNIFICANT CHANGE UP (ref 32–36)
MCV RBC AUTO: 91.6 FL — SIGNIFICANT CHANGE UP (ref 80–100)
MONOCYTES # BLD AUTO: 0.55 K/UL — SIGNIFICANT CHANGE UP (ref 0–0.9)
MONOCYTES NFR BLD AUTO: 7.3 % — SIGNIFICANT CHANGE UP (ref 2–14)
NEUTROPHILS # BLD AUTO: 5.35 K/UL — SIGNIFICANT CHANGE UP (ref 1.8–7.4)
NEUTROPHILS NFR BLD AUTO: 71.4 % — SIGNIFICANT CHANGE UP (ref 43–77)
NITRITE UR-MCNC: NEGATIVE — SIGNIFICANT CHANGE UP
NRBC # BLD: 0 /100 WBCS — SIGNIFICANT CHANGE UP
NRBC # FLD: 0 K/UL — SIGNIFICANT CHANGE UP
PH UR: 6 — SIGNIFICANT CHANGE UP (ref 5–8)
PLATELET # BLD AUTO: 188 K/UL — SIGNIFICANT CHANGE UP (ref 150–400)
POTASSIUM SERPL-MCNC: 5.9 MMOL/L — HIGH (ref 3.5–5.3)
POTASSIUM SERPL-SCNC: 5.9 MMOL/L — HIGH (ref 3.5–5.3)
PROT SERPL-MCNC: 7.8 G/DL — SIGNIFICANT CHANGE UP (ref 6–8.3)
PROT UR-MCNC: NEGATIVE — SIGNIFICANT CHANGE UP
PROTHROM AB SERPL-ACNC: 12.1 SEC — SIGNIFICANT CHANGE UP (ref 10.5–13.4)
RBC # BLD: 4.18 M/UL — LOW (ref 4.2–5.8)
RBC # FLD: 14.3 % — SIGNIFICANT CHANGE UP (ref 10.3–14.5)
RSV RNA NPH QL NAA+NON-PROBE: SIGNIFICANT CHANGE UP
SARS-COV-2 RNA SPEC QL NAA+PROBE: SIGNIFICANT CHANGE UP
SODIUM SERPL-SCNC: 139 MMOL/L — SIGNIFICANT CHANGE UP (ref 135–145)
SP GR SPEC: 1.01 — SIGNIFICANT CHANGE UP (ref 1–1.05)
TROPONIN T, HIGH SENSITIVITY RESULT: 143 NG/L — CRITICAL HIGH
UROBILINOGEN FLD QL: SIGNIFICANT CHANGE UP
WBC # BLD: 7.5 K/UL — SIGNIFICANT CHANGE UP (ref 3.8–10.5)
WBC # FLD AUTO: 7.5 K/UL — SIGNIFICANT CHANGE UP (ref 3.8–10.5)

## 2022-02-26 PROCEDURE — 99291 CRITICAL CARE FIRST HOUR: CPT | Mod: FT,25,GC

## 2022-02-26 PROCEDURE — 93010 ELECTROCARDIOGRAM REPORT: CPT

## 2022-02-26 PROCEDURE — 92941 PRQ TRLML REVSC TOT OCCL AMI: CPT | Mod: LC

## 2022-02-26 PROCEDURE — 93459 L HRT ART/GRFT ANGIO: CPT | Mod: 26,59

## 2022-02-26 PROCEDURE — 71045 X-RAY EXAM CHEST 1 VIEW: CPT | Mod: 26

## 2022-02-26 PROCEDURE — 92978 ENDOLUMINL IVUS OCT C 1ST: CPT | Mod: 26,LC

## 2022-02-26 RX ORDER — CALCIUM GLUCONATE 100 MG/ML
1 VIAL (ML) INTRAVENOUS ONCE
Refills: 0 | Status: COMPLETED | OUTPATIENT
Start: 2022-02-26 | End: 2022-02-26

## 2022-02-26 RX ORDER — ASPIRIN/CALCIUM CARB/MAGNESIUM 324 MG
324 TABLET ORAL ONCE
Refills: 0 | Status: COMPLETED | OUTPATIENT
Start: 2022-02-26 | End: 2022-02-26

## 2022-02-26 RX ORDER — DEXTROSE 50 % IN WATER 50 %
25 SYRINGE (ML) INTRAVENOUS ONCE
Refills: 0 | Status: COMPLETED | OUTPATIENT
Start: 2022-02-26 | End: 2022-02-26

## 2022-02-26 RX ORDER — TICAGRELOR 90 MG/1
180 TABLET ORAL ONCE
Refills: 0 | Status: COMPLETED | OUTPATIENT
Start: 2022-02-26 | End: 2022-02-26

## 2022-02-26 RX ORDER — IPRATROPIUM/ALBUTEROL SULFATE 18-103MCG
3 AEROSOL WITH ADAPTER (GRAM) INHALATION ONCE
Refills: 0 | Status: COMPLETED | OUTPATIENT
Start: 2022-02-26 | End: 2022-02-26

## 2022-02-26 RX ORDER — DEXTROSE 50 % IN WATER 50 %
12.5 SYRINGE (ML) INTRAVENOUS ONCE
Refills: 0 | Status: DISCONTINUED | OUTPATIENT
Start: 2022-02-26 | End: 2022-03-05

## 2022-02-26 RX ORDER — INSULIN LISPRO 100/ML
VIAL (ML) SUBCUTANEOUS
Refills: 0 | Status: DISCONTINUED | OUTPATIENT
Start: 2022-02-26 | End: 2022-03-05

## 2022-02-26 RX ORDER — CHLORHEXIDINE GLUCONATE 213 G/1000ML
1 SOLUTION TOPICAL
Refills: 0 | Status: DISCONTINUED | OUTPATIENT
Start: 2022-02-26 | End: 2022-03-03

## 2022-02-26 RX ORDER — SODIUM CHLORIDE 9 MG/ML
1000 INJECTION, SOLUTION INTRAVENOUS
Refills: 0 | Status: DISCONTINUED | OUTPATIENT
Start: 2022-02-26 | End: 2022-03-05

## 2022-02-26 RX ORDER — INSULIN HUMAN 100 [IU]/ML
10 INJECTION, SOLUTION SUBCUTANEOUS ONCE
Refills: 0 | Status: COMPLETED | OUTPATIENT
Start: 2022-02-26 | End: 2022-02-26

## 2022-02-26 RX ORDER — TICAGRELOR 90 MG/1
90 TABLET ORAL EVERY 12 HOURS
Refills: 0 | Status: DISCONTINUED | OUTPATIENT
Start: 2022-02-27 | End: 2022-03-05

## 2022-02-26 RX ORDER — GLUCAGON INJECTION, SOLUTION 0.5 MG/.1ML
1 INJECTION, SOLUTION SUBCUTANEOUS ONCE
Refills: 0 | Status: DISCONTINUED | OUTPATIENT
Start: 2022-02-26 | End: 2022-03-05

## 2022-02-26 RX ORDER — HEPARIN SODIUM 5000 [USP'U]/ML
4000 INJECTION INTRAVENOUS; SUBCUTANEOUS ONCE
Refills: 0 | Status: COMPLETED | OUTPATIENT
Start: 2022-02-26 | End: 2022-02-26

## 2022-02-26 RX ORDER — HYDRALAZINE HCL 50 MG
10 TABLET ORAL ONCE
Refills: 0 | Status: COMPLETED | OUTPATIENT
Start: 2022-02-26 | End: 2022-02-26

## 2022-02-26 RX ORDER — ASPIRIN/CALCIUM CARB/MAGNESIUM 324 MG
81 TABLET ORAL DAILY
Refills: 0 | Status: DISCONTINUED | OUTPATIENT
Start: 2022-02-27 | End: 2022-03-05

## 2022-02-26 RX ORDER — HEPARIN SODIUM 5000 [USP'U]/ML
INJECTION INTRAVENOUS; SUBCUTANEOUS
Qty: 25000 | Refills: 0 | Status: DISCONTINUED | OUTPATIENT
Start: 2022-02-26 | End: 2022-02-26

## 2022-02-26 RX ORDER — DEXTROSE 50 % IN WATER 50 %
25 SYRINGE (ML) INTRAVENOUS ONCE
Refills: 0 | Status: DISCONTINUED | OUTPATIENT
Start: 2022-02-26 | End: 2022-03-05

## 2022-02-26 RX ORDER — CARVEDILOL PHOSPHATE 80 MG/1
6.25 CAPSULE, EXTENDED RELEASE ORAL EVERY 12 HOURS
Refills: 0 | Status: DISCONTINUED | OUTPATIENT
Start: 2022-02-26 | End: 2022-03-05

## 2022-02-26 RX ORDER — HEPARIN SODIUM 5000 [USP'U]/ML
1400 INJECTION INTRAVENOUS; SUBCUTANEOUS
Qty: 25000 | Refills: 0 | Status: DISCONTINUED | OUTPATIENT
Start: 2022-02-26 | End: 2022-02-28

## 2022-02-26 RX ORDER — HEPARIN SODIUM 5000 [USP'U]/ML
4000 INJECTION INTRAVENOUS; SUBCUTANEOUS EVERY 6 HOURS
Refills: 0 | Status: DISCONTINUED | OUTPATIENT
Start: 2022-02-26 | End: 2022-02-26

## 2022-02-26 RX ORDER — INSULIN LISPRO 100/ML
VIAL (ML) SUBCUTANEOUS AT BEDTIME
Refills: 0 | Status: DISCONTINUED | OUTPATIENT
Start: 2022-02-26 | End: 2022-03-05

## 2022-02-26 RX ORDER — DEXTROSE 50 % IN WATER 50 %
15 SYRINGE (ML) INTRAVENOUS ONCE
Refills: 0 | Status: DISCONTINUED | OUTPATIENT
Start: 2022-02-26 | End: 2022-03-05

## 2022-02-26 RX ORDER — SODIUM ZIRCONIUM CYCLOSILICATE 10 G/10G
10 POWDER, FOR SUSPENSION ORAL ONCE
Refills: 0 | Status: COMPLETED | OUTPATIENT
Start: 2022-02-26 | End: 2022-02-26

## 2022-02-26 RX ADMIN — SODIUM ZIRCONIUM CYCLOSILICATE 10 GRAM(S): 10 POWDER, FOR SUSPENSION ORAL at 16:37

## 2022-02-26 RX ADMIN — INSULIN HUMAN 10 UNIT(S): 100 INJECTION, SOLUTION SUBCUTANEOUS at 16:36

## 2022-02-26 RX ADMIN — Medication 324 MILLIGRAM(S): at 14:39

## 2022-02-26 RX ADMIN — HEPARIN SODIUM 4000 UNIT(S): 5000 INJECTION INTRAVENOUS; SUBCUTANEOUS at 14:53

## 2022-02-26 RX ADMIN — Medication 25 MILLILITER(S): at 16:37

## 2022-02-26 RX ADMIN — CARVEDILOL PHOSPHATE 6.25 MILLIGRAM(S): 80 CAPSULE, EXTENDED RELEASE ORAL at 20:28

## 2022-02-26 RX ADMIN — HEPARIN SODIUM 950 UNIT(S)/HR: 5000 INJECTION INTRAVENOUS; SUBCUTANEOUS at 17:09

## 2022-02-26 RX ADMIN — HEPARIN SODIUM 12 UNIT(S)/HR: 5000 INJECTION INTRAVENOUS; SUBCUTANEOUS at 20:06

## 2022-02-26 RX ADMIN — Medication 3 MILLILITER(S): at 19:51

## 2022-02-26 RX ADMIN — TICAGRELOR 180 MILLIGRAM(S): 90 TABLET ORAL at 14:52

## 2022-02-26 RX ADMIN — Medication 100 GRAM(S): at 16:38

## 2022-02-26 NOTE — PATIENT PROFILE ADULT - FALL HARM RISK - HARM RISK INTERVENTIONS
Assistance with ambulation/Assistance OOB with selected safe patient handling equipment/Communicate Risk of Fall with Harm to all staff/Discuss with provider need for PT consult/Monitor gait and stability/Provide patient with walking aids - walker, cane, crutches/Reinforce activity limits and safety measures with patient and family/Sit up slowly, dangle for a short time, stand at bedside before walking/Tailored Fall Risk Interventions/Use of alarms - bed, chair and/or voice tab/Visual Cue: Yellow wristband and red socks/Bed in lowest position, wheels locked, appropriate side rails in place/Call bell, personal items and telephone in reach/Instruct patient to call for assistance before getting out of bed or chair/Non-slip footwear when patient is out of bed/[place] to call system/Physically safe environment - no spills, clutter or unnecessary equipment/Purposeful Proactive Rounding/Room/bathroom lighting operational, light cord in reach

## 2022-02-26 NOTE — ED PROVIDER NOTE - PROGRESS NOTE DETAILS
MD CHO:  I activated code stemi on receiving this pt to my area.  EKG demonstrates avr, iii mike with diffuse std in seven leads.  Spoke to Dr. Doron Olmstead after faxing ekg to [email or web address], approx 2:20pm.  He will send team to evaluate pt for cath. MD CHO:  Spoke with cards np, accept to ccu. MD CHO:  I activated code stemi on receiving this pt to my area.  EKG demonstrates avr, iii mike with diffuse std in seven leads.  Concern for left main vs triple vessel vs prox lad occlusion.  Spoke to Dr. Doron Olmstead after faxing ekg to [email or web address], approx 2:20pm.  He will send team to evaluate pt for cath. Saulo PGY3: 87yo male with significant cardiac disease history presenting with chest tightness starting today and generalized weakness/ fatigue over past 2 weeks.  ECG with AVR/ inferior elevations and depressions in multiple leads.  Patient with ongoing symptoms.  D/w cardiology and accepted to CCU with likely plan for cath.

## 2022-02-26 NOTE — ED PROVIDER NOTE - OBJECTIVE STATEMENT
87 y/o male h/o cabg stents dm htn hld p/w near syncopal episode today and cp.  Per son, he has been feeling generalized fatigue for past few weeks but this morning nearly collapsed when standing and developed chest pressure.  Called his cardiologist, Dr. Corona, who advised he go to ED for eval.  Pt continues to endorse chest pressure and generalized fatigue.  Was due for dental appt this morning and has not taken asa for few days.

## 2022-02-26 NOTE — PATIENT PROFILE ADULT - FUNCTIONAL ASSESSMENT - BASIC MOBILITY SECTION LABEL
DATE OF SERVICE:  02/02/2021     PREOPERATIVE DIAGNOSIS:  Gross hematuria.     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     PROCEDURE:  Cystoscopy and fulguration of bleeding vessels.     DESCRIPTION OF PROCEDURE:  The patient was brought to the operating room, was   given a general anesthetic, placed in lithotomy position, prepped and draped   in sterile fashion.  Continuous flow resectoscope was placed into the bladder.    He had a normal anterior bulbar urethra.  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  Bladder surveyed.  Trigone is   identified with both patent ureteral orifices.  In the midline of the bladder,   from just above the trigone, towards the mid posterior wall, he has multiple   areas of abnormal vasculature consistent with radiation changes.  Some of   these vessels are bleeding at a regular steady pace.  There is no arterial   bleeding.  There is no tissue that looks to be consistent with recurrent   carcinoma of the bladder.  Using a rollerball, the abnormal vessels are   coagulated.  The bladder is cycled by filling and emptying several times to   make sure there is no bleeding spots.  A survey of the bladder was done with   no areas of tumor or other bleeding areas.  Both orifices remain intact and   effluxing clear urine.  Satisfied all areas of potential bleeding are   coagulated, a 3-way catheter was placed.  He is sent to recovery in stable   condition.     FINAL DIAGNOSIS:  Status post cystoscopy and fulguration of bleeding vessels.        ______________________________  MD CARLA Pinzon/ELIZABETH/GUSTAVO    DD:  02/02/2021 14:35  DT:  02/02/2021 14:49    Job#:  [id number]   .

## 2022-02-26 NOTE — ED ADULT NURSE REASSESSMENT NOTE - NS ED NURSE REASSESS COMMENT FT1
Mobile Critical Care RN:. Pt awaiting CCU bed availability at this time.  Heparin gtt pending from pharmacy.  phone and order msg request x2.  VS as noted, all other medication administered.

## 2022-02-26 NOTE — H&P ADULT - NSHPPOADEEPVENOUSTHROMB_GEN_A_CORE
SUBJECTIVE  Patient ID: Jeremie Mera is a 48 year old male.    Chief Complaint   Patient presents with   • Back Pain     Pt c/o having low back pain.       HPI  Jeremie Mera is a 48 year old male with ongoing lower back pain. Pain 0/10. Pain is worsened with activity, walking, standing, and bending. States every morning upon getting out of bed feels generalized discomfort, and improves throughout it.  Admits stopped exercises and restarted by doing push ups in the morning. States after re-starting work outs felt strain on right side. Admits stopped exercising due to discomfort. Admits symptoms improved with rest. Denies any direct trauma, fall or MVA.       Patient's medications, allergies, past medical, surgical, social and family histories were reviewed and updated as appropriate.    Review of Systems   Constitutional: Negative for activity change, appetite change, chills, diaphoresis, fatigue, fever and unexpected weight change.   HENT: Negative for congestion, ear discharge, ear pain, postnasal drip, trouble swallowing and voice change.    Eyes: Negative for pain, discharge, redness, itching and visual disturbance.   Respiratory: Negative for cough and shortness of breath.    Cardiovascular: Negative for chest pain.   Gastrointestinal: Negative for abdominal distention, abdominal pain, constipation, diarrhea, nausea and vomiting.   Endocrine: Negative for cold intolerance, heat intolerance, polydipsia, polyphagia and polyuria.   Genitourinary: Negative for difficulty urinating, dysuria and flank pain.   Musculoskeletal: Positive for back pain (right lower back). Negative for arthralgias, gait problem, joint swelling, myalgias, neck pain and neck stiffness.   Skin: Negative for color change, pallor and rash.   Allergic/Immunologic: Negative for environmental allergies, food allergies and immunocompromised state.   Neurological: Negative for dizziness, numbness and headaches.   Hematological: Negative for  adenopathy. Does not bruise/bleed easily.   Psychiatric/Behavioral: Negative for agitation and confusion.       OBJECTIVE     Visit Vitals  /66 (BP Location: [facility] [place], Patient Position: Sitting, Cuff Size: Large Adult)   Pulse 80   Resp 16   Ht 6' 1\" (1.854 m)   Wt 117.5 kg (259 lb)   SpO2 97%   BMI 34.17 kg/m²       Physical Exam   Constitutional: He is oriented to person, place, and time. He appears well-developed and well-nourished.   HENT:   Head: Normocephalic and atraumatic.   Eyes: Pupils are equal, round, and reactive to light. EOM are normal.   Cardiovascular: Normal rate, regular rhythm, S1 normal, S2 normal, normal heart sounds, intact distal pulses and normal pulses.   Pulmonary/Chest: Effort normal and breath sounds normal.   Abdominal: Soft. Bowel sounds are normal.   Musculoskeletal: Normal range of motion. Tenderness: right lower pain.   Neurological: He is alert and oriented to person, place, and time.   Skin: Skin is warm and dry.   Nursing note and vitals reviewed.      ASSESSMENT   Problem List Items Addressed This Visit        Musculoskeletal    Acute right-sided low back pain without sciatica - Primary          PLAN  1. Acute Rt Sided LBP w/o Sciatica: Will defer Xray today, as pt declines any pain at this time. Will call clinic if any changes.   Stay active, discussed diet and exercise.   Gentle stretching with advancing to strengthening and aerobic conditioning as tolerated.  Ice pack therapy for up to 15 min/session up to 6-8 times / day.  Heat wrap therapy for up to 8 hours/day.  Acetaminophen for additional pain relief.  Patient understands and agrees with plan.    Schedule follow up: Return in about 1 month (around 6/10/2019) for F/up Lower back pain.      no

## 2022-02-26 NOTE — PATIENT PROFILE ADULT - FALL HARM RISK - FACTORS
Impaired vision/IV and/or equipment tethered to patient/Other medical problems/Post procedure/Syncope/Weakness

## 2022-02-26 NOTE — H&P ADULT - NSHPSOCIALHISTORY_GEN_ALL_CORE
denies smoking  and alcohol use history  retired  denies smoking  and alcohol use history  retired  lives with wife, adult son and daughter in law.  Daughter in law is his physician

## 2022-02-26 NOTE — H&P ADULT - HISTORY OF PRESENT ILLNESS
88 year-old male h/o CABG stents DM, HTN, HLD, CKD, PAD p/w near syncopal episode today and cp.  Per son, he has been feeling generalized fatigue for past few weeks but this morning nearly collapsed when standing and developed chest pressure.  Called his cardiologist, Dr. Corona, who advised he go to ED for eval.  Pt continues to report chest pressure and generalized fatigue.  Was due for dental appt this morning and has not taken asa for few days. 88 year-old male h/o CABG stents x 5, DM, HTN, HLD, CKD, PAD s/p stent to right leg p/w near syncopal episode today and cp.  Per son, he has been feeling generalized fatigue for past few weeks but this morning nearly collapsed when standing and developed chest pressure.  Called his cardiologist, Dr. Corona, who advised he go to ED for eval.  Pt continues to report chest discomfort and generalized fatigue.  Was due for dental appt this morning and has not taken asa for few days. In ED EKG showed  ROCIO in aVR ,STD in I, aVL,V2 to V6 with ist degree /LAFB/ RBBB. Received asa 325mg PO x1 , Brilinta 180mg PO x1 and heparin drip for ACS. Trop 143. Received insulin/dex 50% and mike gluconate IV and lokema  for K+ 5.9. He was taken to cath lab for ongoing chest discomfort. This is an 88 year-old man with past medical history of CAD s/p CABG (1999), PCI stents x 5 (2019), PVDV with stent place to Right Femoral Artery (2021), T2DM, HTN, HLD, CKD, presented with near syncope and chest pain.  Patient's son states he has been feeling generalized fatigue for past few weeks but this morning nearly collapsed when standing and developed chest pressure.  Called his cardiologist, Dr. Corona, who advised he go to ED for evaluation.  Pt continues to report chest discomfort and generalized fatigue.  Was due for dental appt this morning and has not taken asa for few days. 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 Received asa 325mg PO x1 , Brilinta 180mg PO x1 and heparin drip for ACS. Trop 143. Received insulin/dex 50% and mike gluconate IV and lokema  for K+ 5.9. He was taken to cath lab for ongoing chest discomfort.  Patient went urgently to cath lab for sten to SVG to OM1 with IABP placed. This is an 88 year-old man with past medical history of CAD s/p CABG (1999), PCI stents x 5 (2019), PVD with stent place to Right Posterior Tibial Artery? (2021), T2DM, HTN, HLD, CKD, presented with near syncope and chest pain.  Patient's son states he has been feeling generalized fatigue for past few weeks but this morning nearly collapsed when standing and developed chest pressure.  Called his cardiologist, Dr. Corona, who advised he go to ED for evaluation.  Pt continues to report chest discomfort and generalized fatigue.  Was due for dental appt this morning and has not taken asa for few days. 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 Received asa 325mg PO x1 , Brilinta 180mg PO x1 and heparin drip for ACS. Trop 143. Received insulin/dex 50% and mike gluconate IV and lokema  for K+ 5.9. He was taken to cath lab for ongoing chest discomfort.  Patient went urgently to cath lab for sten to SVG to OM1 with IABP placed.

## 2022-02-26 NOTE — ED ADULT NURSE NOTE - OBJECTIVE STATEMENT
89 y/o M with PMHx of CAD s/p 4V CABG, Cardiac Stents (last stent 5/2019,) DM, HLD, HTN, and CVA (w/ L paresthesia in 1998), pw weakness.  States he feels like passing out since almost 2 weeks.  Today while started to have chest tightness.  EKG demonstrates ROCIO in avr, iii with diffuse std in seven leads.  Pt with 2x USIV L arm. Mobile Critical Care RN:. 89 y/o M with PMHx of CAD s/p 4V CABG, Cardiac Stents (last stent 5/2019,) DM, HLD, HTN, and CVA (w/ L paresthesia in 1998), pw weakness.  States he feels like passing out since almost 2 weeks.  Today while started to have chest tightness.  EKG demonstrates ROCIO in avr, iii with diffuse std in seven leads.  Pt with 2x USIV L arm.

## 2022-02-26 NOTE — H&P ADULT - NSHPLABSRESULTS_GEN_ALL_CORE
LABS:                        12.3   7.50  )-----------( 188      ( 26 Feb 2022 14:40 )             38.3     02-26    139  |  106  |  38<H>  ----------------------------<  233<H>  5.9<H>   |  23  |  2.07<H>    Ca    9.3      26 Feb 2022 14:40    TPro  7.8  /  Alb  4.5  /  TBili  0.4  /  DBili  x   /  AST  23  /  ALT  21  /  AlkPhos  65  02-26    PT/INR - ( 26 Feb 2022 14:40 )   PT: 12.1 sec;   INR: 1.04 ratio         PTT - ( 26 Feb 2022 14:40 )  PTT:30.1 sec    ------------------

## 2022-02-26 NOTE — H&P ADULT - NSHPREVIEWOFSYSTEMS_GEN_ALL_CORE
REVIEW OF SYSTEMS: As indicated above; otherwise negative    CONSTITUTIONAL: No weakness, fevers or chills  EYES/ENT: No visual changes; no vertigo or throat pain   NECK: No pain or stiffness  RESPIRATORY: No cough, wheezing, hemoptysis; No shortness of breath  CARDIOVASCULAR: No chest pain or palpitations  GASTROINTESTINAL: No abdominal or epigastric pain. No nausea, vomiting, or hematemesis; no diarrhea or constipation; no melena or hematochezia.  GENITOURINARY: No dysuria, frequency or hematuria  NEUROLOGICAL: No numbness or weakness  SKIN: No itching, rashes REVIEW OF SYSTEMS: As indicated above; otherwise negative  CONSTITUTIONAL: + generalized weakness, no fevers or chills  EYES/ENT: No visual changes; no vertigo or throat pain   NECK: No pain or stiffness  RESPIRATORY: No cough, wheezing, hemoptysis; No shortness of breath  CARDIOVASCULAR: +  chest discomfort, no palpitations  GASTROINTESTINAL: No abdominal or epigastric pain. No nausea, vomiting, or hematemesis; no diarrhea or constipation; no melena or hematochezia.  GENITOURINARY: No dysuria, frequency or hematuria  NEUROLOGICAL: No numbness, + generalized  weakness,+ near syncope  SKIN: No itching, rashes

## 2022-02-26 NOTE — H&P ADULT - NSHPPHYSICALEXAM_GEN_ALL_CORE
PHYSICAL EXAM:  GENERAL: NAD, lying in bed comfortably  HEAD:  Atraumatic, Normocephalic  EYES: EOMI, PERRLA, conjunctiva and sclera clear  ENT: Moist mucous membranes  NECK: Supple, No JVD; no palpable pre-auricular, post-auricular, occipital, mandibular, submental, supra-clavicular, or infra-clavicular lymph nodes   CHEST/LUNG: Clear to auscultation bilaterally; No rales, rhonchi, wheezing, or rubs. Unlabored respirations  HEART: Regular rate and rhythm; No murmurs, rubs, or gallops  ABDOMEN: Bowel sounds present; Soft, Nontender, Nondistended. No hepatomegaly  EXTREMITIES:  2+ Peripheral Pulses, brisk capillary refill. No clubbing, cyanosis, or edema  NERVOUS SYSTEM:  Alert & Oriented X3, speech clear. No deficits   MSK: FROM all 4 extremities, full and equal strength  SKIN: No rashes or lesions PHYSICAL EXAM:  GENERAL: NAD, lying in bed comfortably  HEAD:  Atraumatic, Normocephalic  EYES: EOMI, PERRLA, conjunctiva and sclera clear  ENT: Moist mucous membranes  NECK: Supple, No JVD; no palpable pre-auricular, post-auricular, occipital, mandibular, submental, supra-clavicular, or infra-clavicular lymph nodes   CHEST/LUNG: b/l lungs crackles to  auscultation bilaterally, rhonchi, wheezing, or rubs. Unlabored respirations  HEART: Regular rate and rhythm; No murmurs, rubs, or gallops  ABDOMEN: Bowel sounds present; Soft, Nontender, Nondistended. No hepatomegaly  EXTREMITIES:  2+ Peripheral Pulses, brisk capillary refill. No clubbing, cyanosis, or edema  NERVOUS SYSTEM:  Alert & Oriented X3, speech clear. No deficits   MSK: FROM all 4 extremities, full and equal strength  SKIN: No rashes or lesions

## 2022-02-26 NOTE — ED PROVIDER NOTE - CARE PLAN
1 Principal Discharge DX:	Chest pain   Principal Discharge DX:	ACS (acute coronary syndrome)  Secondary Diagnosis:	Chest pain  Secondary Diagnosis:	Near syncope

## 2022-02-26 NOTE — ED ADULT NURSE REASSESSMENT NOTE - NS ED NURSE REASSESS COMMENT FT1
US guided PIV placement.  Indication - poor access.  Findings: compressible left AC vein.  Using direct US guidance, under clean conditions, a 18gcatheter introduced into vessel.  US performed after procedure, functional catheter in vein, no complications.  Dark blood noted from port.  flushed with no resistance.  patient tolerated procedure well.  Sterile dressing applied.  Impression:  successful US guided 18g PIV placement.

## 2022-02-26 NOTE — PATIENT PROFILE ADULT - VISION (WITH CORRECTIVE LENSES IF THE PATIENT USUALLY WEARS THEM):
glasses  at home/Partially impaired: cannot see medication labels or newsprint, but can see obstacles in path, and the surrounding layout; can count fingers at arm's length

## 2022-02-26 NOTE — ED PROVIDER NOTE - CLINICAL SUMMARY MEDICAL DECISION MAKING FREE TEXT BOX
87 y/o male h/o cabg stents with near syncope chest pressure this am.  EKG concerning for STEMI equivalent.  Activated code stemi.  Obtain cbc cmp trop ekg cxr flu with covid swab, pending cath eval.

## 2022-02-26 NOTE — H&P ADULT - ASSESSMENT
Patient is 88yMale with PMHx of *** who presents with ***.     NEURO:  #syncope   - Currently A&Ox3    CV:  #STEMI (+trops, +EKG changes) | NSTEMI (+trops) | unstable angina (-trops): Typical/atypical symptoms, loaded with 325 mg ASA, 180mg Ticagrelor/ 600mg Clopidogrel, sublingual nitroglycerin 0.4mg, heparin bolus (check monogram)  - Revascularization (within 48hrs of symptoms) with PCI  - JORDYN score: (if >140, revascularize early)  - Nitroglycerin gtt 5mcg/min UNLESS anterior MI for pain relief  - DAPT: ASA 81, Clopidogrel 75mg qD /Ticagrelor 90mg BID for 1 year  - Statin: atorvastatin 80mg qD  - Heparin gtt (for 48 hrs or until revascularization with PCI, check monogram)    s/p stent: stop heparin, nitro gtt  -c/w DAPT  - start B-blocker: metoprolol tartrate  - start ACE-I (wait until AM labs return)   - statin: atorvastatin 80mg qD  - check ECHO in 48hr if anterior wall MI    #CHF: EF __%, on JVD ***  -Bblocker: metoprolol succinate 25mg qD / carvedilol 3.125mg qD  -Diuretic: furosemide (lasix) 20-40-60-80 mg qD | torsemide | bumetanide (bumex) 1mg qD  -Trend I/Os and daily weights, and Cr    #Arrythmia:   - Monitor on tele    #AFib:   - CHADSVASC score:  - HAS-BLED score:  - a/c: DOAC (apixiban 5mg BID (Eliquis)| dabigatran 150mg BID (Pradexa)| Riveroxaban 20mg qD (Xeralto) > Coumadin  - If new: get echo (check for tachycardia, valvular AF, L atrial size which is large if chronic)  - Rate control: BB/Digoxin/Amio if low EF  - Monitor telemetry    #s/p Atrial fibrillation Ablation:  - CHADSVASC/HAS-BLED score:  - a/c: Coumadin, DOAC (eliquis/pradexa/xeralto) resume after 6hrs post procedure  - Bedrest x6hrs, resume head of bed @30 degrees afterwards  - 12-lead EKG   - Continuous telemetry monitoring for arrythmias  - TTE echo (to r/o tamponade and heart fx)  - Protonix 40mg qD (prophylactic prevent acidity b/c heat from ablation since L atrium is close to esophagusx 1mo)   - DASH diet: Soft  - Pro-BNP labs in AM (to determine underlying dilation of heart)    #s/p Atrial flutter Ablation:  - CHADSVASC/HAS-BLED score:  - a/c: Coumadin, DOAC (eliquis/pradexa/xeralto)  - Rate control: CCB or BB  - Continuous telemetry monitoring for arrythmias  - Bedrest x6hrs, resume head of bed @30 degrees afterwards  - 12-lead EKG now    #Hemodynamically stable/unstable:  - On pressors due to ____ shock (cardiogenic due to muscle or valve failure, obstructive due to peff, PE, PTX, hypovolemic, vasopegic)     PULM:  #COPD: *Home meds* on __L O2    RENAL:  #MABLE:   -UO:  -Moss:    GI:  #Transaminitis: Elevated LFTs  #Diet:  #Bowel regiment:    ENDO:  #DM2: HbA1c ***   - Insulin Sliding Scale    METABOLIC:  #Electrolyte abnormalities    HEMATOLOGIC:  #CBC results show  #Coag panel shows  #DVT prophylaxis with     ID:  #Pt without strong objective or clinical evidence of infection. Will observe off antibiotics    SKIN:  #Lines:  #Decubitus ulcers:       88 y male hx HTN, HLD, DMT2, stroke, MI, CAD s/p 5 stents, and CABG presenting w. near syncopal episode and acute onset chest.... EKG findings indicate. Admitted to CCU for further management.     NEURO:  #syncope   - Currently A&Ox3    CV:  #STEMI (+trops, +EKG changes) | NSTEMI (+trops) | unstable angina (-trops): Typical/atypical symptoms, loaded with 325 mg ASA, 180mg Ticagrelor/ 600mg Clopidogrel, sublingual nitroglycerin 0.4mg, heparin bolus (check monogram)  - Revascularization (within 48hrs of symptoms) with PCI  - JORDYN score: (if >140, revascularize early)  - Nitroglycerin gtt 5mcg/min UNLESS anterior MI for pain relief  - DAPT: ASA 81, Clopidogrel 75mg qD /Ticagrelor 90mg BID for 1 year  - Statin: atorvastatin 80mg QD  - Heparin gtt (for 48 hrs or until revascularization with PCI, check monogram)    Hx CAD s/p 5 stents w. last one placed in 5/2019  - c/w DAPT ASA and clopidogrel   - consider starting B-blocker given MI hx  - cont. ACE-I (wait until AM labs return)   - cont w. rosuvastatin 40mg QD  - repeat TTE as last RICHARD indicated EF 45-50% w. hypokinesis of inferolateral wall    PULM:  -No active issues    RENAL:  #MABLE:   -UO:  -Moss:    GI:  #Transaminitis: Elevated LFTs  #Diet:  #Bowel regiment:    ENDO:  #DM2: HbA1c ***   - Insulin Sliding Scale    METABOLIC:  #Electrolyte abnormalities    HEMATOLOGIC:  #CBC results show  #Coag panel shows  #DVT prophylaxis with     ID:  -No active issues    SKIN:  #Lines:     ************INCOMPLETE NOTE******************    88 y male hx HTN, HLD, DMT2, stroke, MI, CAD s/p 5 stents, and CABG presenting w. near syncopal episode and acute onset chest.... EKG findings indicate. Admitted to CCU for further management.     NEURO:  #syncope   - Currently A&Ox3    CV:  #STEMI (+trops, +EKG changes) | NSTEMI (+trops) | unstable angina (-trops): Typical/atypical symptoms, loaded with 325 mg ASA, 180mg Ticagrelor/ 600mg Clopidogrel, sublingual nitroglycerin 0.4mg, heparin bolus (check monogram)  - Revascularization (within 48hrs of symptoms) with PCI  - JORDYN score: (if >140, revascularize early)  - Nitroglycerin gtt 5mcg/min UNLESS anterior MI for pain relief  - DAPT: ASA 81, Clopidogrel 75mg qD /Ticagrelor 90mg BID for 1 year  - Statin: atorvastatin 80mg QD  - Heparin gtt (for 48 hrs or until revascularization with PCI, check monogram)    Hx CAD s/p 5 stents w. last one placed in 5/2019  - c/w DAPT ASA and clopidogrel   - consider starting B-blocker given MI hx  - cont. ACE-I (wait until AM labs return)   - cont w. rosuvastatin 40mg QD  - repeat TTE as last RICHARD indicated EF 45-50% w. hypokinesis of inferolateral wall    PULM:  -No active issues    RENAL:  #MABLE:   -UO:  -Moss:    GI:  #Transaminitis: Elevated LFTs  #Diet:  #Bowel regiment:    ENDO:  #DM2: HbA1c ***   - Insulin Sliding Scale    METABOLIC:  #Electrolyte abnormalities    HEMATOLOGIC:  #CBC results show  #Coag panel shows  #DVT prophylaxis with     ID:  -No active issues    SKIN:  #Lines:     ************INCOMPLETE NOTE******************    88 y male hx HTN, HLD, DMT2, CKD, stroke, MI, CAD s/p 5 stents, and CABG presenting w. near syncopal episode and acute onset chest.... EKG findings indicate. Admitted to CCU for further management.     NEURO:  #Near syncopal episode may have been vasovagal    - Currently A&Ox3    CV:  #STEMI (+trops, +EKG changes) | NSTEMI (+trops) | unstable angina (-trops): Typical/atypical symptoms, loaded with 325 mg ASA, 180mg Ticagrelor/ 600mg Clopidogrel, sublingual nitroglycerin 0.4mg, heparin bolus (check monogram)  - Revascularization (within 48hrs of symptoms) with PCI  - JORDYN score: (if >140, revascularize early)  - Nitroglycerin gtt 5mcg/min UNLESS anterior MI for pain relief  - DAPT: ASA 81, Clopidogrel 75mg qD /Ticagrelor 90mg BID for 1 year  - Statin: atorvastatin 80mg QD  - Heparin gtt (for 48 hrs or until revascularization with PCI, check monogram)    Hx CAD s/p 5 stents w. last stent placed in 5/2019  - c/w DAPT ASA and clopidogrel   - consider starting B-blocker given MI hx  - cont. ACE-I (wait until AM labs return)   - cont w. rosuvastatin 40mg QD  - repeat TTE as last RICHARD indicated EF 45-50% w. hypokinesis of inferolateral wall    PULM:  -No active issues    RENAL:  #MABLE:   -UO:  -Moss:    GI:  #Transaminitis: Elevated LFTs  #Diet:  #Bowel regiment:    ENDO:  #DM2: HbA1c ***   - Insulin Sliding Scale    METABOLIC:  #Electrolyte abnormalities    HEMATOLOGIC:  #CBC results show  #Coag panel shows  #DVT prophylaxis with     ID:  -No active issues    SKIN:  #Lines:     ************INCOMPLETE NOTE******************    88 y male hx HTN, HLD, DMT2, CKD, stroke, MI, CAD s/p 5 stents, and CABG presenting w. near syncopal episode and acute onset chest.... EKG findings indicate. Admitted to CCU for further management.     NEURO:  #Near syncopal episode may have been vasovagal    - Currently A&Ox3    CV:  #STEMI (+trops, +EKG changes) | NSTEMI (+trops) | unstable angina (-trops): Typical/atypical symptoms, loaded with 325 mg ASA, 180mg Ticagrelor/ 600mg Clopidogrel, sublingual nitroglycerin 0.4mg, heparin bolus (check monogram)  - Revascularization (within 48hrs of symptoms) with PCI  - JORDYN score: (if >140, revascularize early)  - Nitroglycerin gtt 5mcg/min UNLESS anterior MI for pain relief  - DAPT: ASA 81, Clopidogrel 75mg qD /Ticagrelor 90mg BID for 1 year  - Statin: atorvastatin 80mg QD  - Heparin gtt (for 48 hrs or until revascularization with PCI, check monogram)    #Hx CAD s/p 5 stents w. last stent placed in 5/2019  - c/w DAPT ASA and clopidogrel   - consider starting B-blocker given MI hx  - cont. ACE-I (wait until AM labs return)   - cont w. rosuvastatin 40mg QD  - repeat TTE as last RICHARD indicated EF 45-50% w. hypokinesis of inferolateral wall    #Hypertension      PULM:  -No active issues    RENAL:  #MABLE:   -UO:  -Moss:    GI:  #Diet: DASH/TLC w. consistent carbs     ENDO:  #DM2: HbA1c ***   - Insulin Sliding Scale    METABOLIC:  #Mild hyperkalemia  -Received 10units insulin w. glucose, 1gm Ca++ gluconate  -Monitor telemetry and for cardiac related     HEMATOLOGIC:  #CBC results show  #Coag panel shows  #DVT prophylaxis with     ID:  -No active issues    SKIN:  #Lines:     ************INCOMPLETE NOTE******************    88 y male hx HTN, HLD, DMT2, CKD, stroke, MI, CAD s/p 5 stents, and CABG presenting w. near syncopal episode and acute onset chest.... EKG findings indicate. Admitted to CCU for further management.     NEURO:  #Near syncopal episode may have been vasovagal    - Currently A&Ox3    CV:  #STEMI (+trops, +EKG changes) | NSTEMI (+trops) | unstable angina (-trops): Typical/atypical symptoms, loaded with 325 mg ASA, 180mg Ticagrelor/ 600mg Clopidogrel, sublingual nitroglycerin 0.4mg, heparin bolus (check monogram)  - Revascularization (within 48hrs of symptoms) with PCI  - JORDYN score: (if >140, revascularize early)  - Nitroglycerin gtt 5mcg/min UNLESS anterior MI for pain relief  - DAPT: ASA 81, Clopidogrel 75mg qD /Ticagrelor 90mg BID for 1 year  - Statin: atorvastatin 80mg QD  - Heparin gtt (for 48 hrs or until revascularization with PCI, check monogram)    #Hx CAD s/p 5 stents w. last stent placed in 5/2019  - c/w DAPT ASA and clopidogrel   - consider starting B-blocker given MI hx  - cont. ACE-I (wait until AM labs return)   - cont w. rosuvastatin 40mg QD  - repeat TTE as last RICHARD indicated EF 45-50% w. hypokinesis of inferolateral wall    #Hypertension      PULM:  -No active issues    RENAL:  #MABLE:   -UO:  -Moss:    GI:  #Diet: DASH/TLC w. consistent carbs     ENDO:  #DM2: HbA1c ***   - Insulin Sliding Scale    METABOLIC:  #Mild hyperkalemia  -Received 10units insulin w. glucose, 1gm Ca++ gluconate  -Monitor telemetry and for cardiac related     HEMATOLOGIC:  #CBC results show    #DVT prophylaxis not needed as patient on DAPT & heparin gtt    ID:  -No active issues    SKIN:  #Lines:    Disposition:  -pending    ************INCOMPLETE NOTE******************    88 y male hx HTN, HLD, DMT2, CKD, stroke, MI, CAD s/p 5 stents, and CABG presenting w. near syncopal episode and acute onset chest.... EKG findings indicate. Admitted to CCU for further management.     NEURO:  #Near syncopal episode may have been vasovagal    - Currently A&Ox3    CV:  #STEMI (+trops, +EKG changes) | Typical/atypical symptoms, loaded with 325 mg ASA, 180mg Ticagrelor/nitroglycerin 0.4mg, heparin bolus w. nomogram  - pending finalized [facility] report  - Cont w.  ASA 81, Clopidogrel 75mg qD  - Continue w. heparin gtt for 24-48hrs   - resume home dose of rosuvastatin 40mg QD  - Heparin gtt (for 48 hrs or until revascularization with PCI, check monogram)    #Hx CAD s/p 5 stents w. last stent placed in 5/2019  - c/w DAPT ASA and clopidogrel   - consider starting B-blocker given MI hx  - cont. ACE-I (wait until AM labs return)   - cont w. rosuvastatin 40mg QD  - repeat TTE as last RICHARD indicated EF 45-50% w. hypokinesis of inferolateral wall    PULM:  -No active issues    RENAL:  #CKD stage 3   -UO:  -Moss:    GI:  #Diet: DASH/TLC w. consistent carbs     ENDO:  #DM2: HbA1c ***   - Insulin Sliding Scale    METABOLIC:  #Mild hyperkalemia  -Received 10units insulin w. glucose, 1gm Ca++ gluconate  -Monitor telemetry and for cardiac related     HEMATOLOGIC:  #CBC results show    #DVT prophylaxis not needed as patient on DAPT & heparin gtt    ID:  -No active issues    SKIN:  #Lines:    Disposition:  -pending    ************INCOMPLETE NOTE******************    88 y male hx HTN, HLD, DMT2, CKD, stroke, MI, CAD s/p 5 stents, and CABG presenting w. near syncopal episode and acute onset chest.... EKG findings indicate. Admitted to CCU for further management.     NEURO:  #Near syncopal episode may have been vasovagal    - Currently A&Ox3  - Continue monitor  - Fall risk protocol     CV:  #STEMI (+trops, +EKG changes) | chest discomfort, loaded with 325 mg ASA, 180mg Ticagrelor/nitroglycerin 0.4mg, heparin bolus w. nomogram  - pending finalized [facility] report  - Cont w.  ASA 81, Clopidogrel 75mg qD  - Continue w. heparin gtt for 24-48hrs   - resume home dose of rosuvastatin 40mg QD  - Heparin gtt (for 48 hrs or until revascularization with PCI, check monogram)    #Hx CAD s/p 5 stents w. last stent placed in 5/2019  - c/w DAPT ASA and clopidogrel   - consider starting B-blocker given MI hx  - cont. ACE-I (wait until AM labs return)   - cont w. rosuvastatin 40mg QD  - repeat TTE as last RICHARD indicated EF 45-50% w. hypokinesis of inferolateral wall    PULM:  -No active issues    RENAL:  #CKD stage 3   -UO:  -Moss:    GI:  #Diet: DASH/TLC w. consistent carbs     ENDO:  #DM2  - Order HbA1c and lipid panel  - Placed on Insulin Sliding Scale    METABOLIC:  #Mild hyperkalemia  -Received 10units insulin w. glucose, 1gm Ca++ gluconate  -Monitor telemetry and for cardiac related     HEMATOLOGIC:  #mild normochromic normocytic anemia  - Monitor for now     #DVT prophylaxis not needed as patient on DAPT & heparin gtt    ID:  -No active issues    SKIN:  #Lines:     Disposition:  -pending clinical improvement   ************INCOMPLETE NOTE******************    88 y male hx HTN, HLD, DMT2, CKD, stroke, MI, PAD s/p right left stent CAD s/p 5 stents, and CABG presenting w. near syncopal episode and acute onset chest found to have NSTEMI . s/p LHC showed          . Admitted to CCU for further management.     NEURO:  #Near syncopal episode may have been vasovagal    - Currently A&Ox3  - Continue monitor  - Fall risk protocol     CV:  #NSTEMI (+trops, +EKG changes) | chest discomfort, loaded with 325 mg ASA, 180mg Ticagrelor/nitroglycerin 0.4mg, heparin bolus w. nomogram  - pending finalized [facility] report  - Cont w.  ASA 81, Clopidogrel 75mg qD  - Continue w. heparin gtt for 24-48hrs   - resume home dose of rosuvastatin 40mg QD  - Heparin gtt (for 48 hrs or until revascularization with PCI, check monogram)    #Hx CAD s/p 5 stents w. last stent placed in 5/2019  - c/w DAPT ASA and clopidogrel   - consider starting B-blocker given MI hx  - cont. ACE-I (wait until AM labs return)   - cont w. rosuvastatin 40mg QD  - repeat TTE as last RICHARD indicated EF 45-50% w. hypokinesis of inferolateral wall    PULM:  -No active issues    RENAL:  #CKD stage 3   -UO:  -    GI:  #Diet: DASH/TLC w. consistent carbs     ENDO:  #DM2  - Order HbA1c and lipid panel  - Placed on Insulin Sliding Scale    METABOLIC:  #Mild hyperkalemia  -Received 10units insulin w. glucose, 1gm Ca++ gluconate  -Monitor telemetry and for cardiac related     HEMATOLOGIC:  #mild normochromic normocytic anemia  - Monitor for now     #DVT prophylaxis not needed as patient on DAPT & heparin gtt    ID:  -No active issues    SKIN:  #Lines:     Disposition:  -pending clinical improvement   88M PMH CAD/MI (CABG + Stents X 5), PAD s/p Right LE Stent, HTN, HLD, DMT2, CKD, CVA (no residual), presented with near syncope and Chest pain, STEMI with ROCIO in III + AVR s/p urgent cath with GEMMA to SVG to OM1.  IABP placed for coronary artery perfusion.      NEURO:  #Near syncopal episode may have been vasovagal    - Currently A&Ox3  - Continue monitor  - Fall risk protocol     CV:  #STEMI (+trops, +EKG changes) | chest discomfort, loaded with 325 mg ASA, 180mg Ticagrelor/nitroglycerin 0.4mg, heparin bolus w. nomogram  - s/p LHC with GEMMA to SVG to OM2  - Cont w.  ASA 81, Ticagrelor 90mg BID  - IABP 1:1 ratio  - Continue heparin gtt full AC for IABP prophylaxis   - resume home dose of rosuvastatin 40mg QD  - Consider ACE-I and Beta Blocker when appropriate  - TTE to evaluate LV Function in am  - DASH/CC Diet    #Hx CAD s/p 5 stents w. last stent placed in 5/2019  - c/w DAPT ASA and clopidogrel   - consider starting B-blocker given MI hx  - cont. ACE-I (wait until AM labs return)   - cont w. rosuvastatin 40mg QD  - repeat TTE as last RICHARD indicated EF 45-50% w. hypokinesis of inferolateral wall    PULM:  -No active issues    RENAL:  #CKD stage 3   -Strict I+O  - Check Electrolytes and replenish to maintain K >4 and Mag >2  - Avoid nephrotoxic drugs    GI:  #Diet: DASH/TLC w. consistent carbs     ENDO:  #DM2  - Order HbA1c and lipid panel  - Placed on Insulin Sliding Scale    METABOLIC:  #Mild hyperkalemia  -Received 10units insulin w. glucose, 1gm Ca++ gluconate  -Monitor telemetry and for cardiac related     HEMATOLOGIC:  #mild normochromic normocytic anemia  - Monitor for now     #DVT prophylaxis not needed as patient on DAPT & heparin gtt    ID:  -No active issues    SKIN:  #Lines:     Disposition:  -pending clinical improvement

## 2022-02-26 NOTE — ED ADULT TRIAGE NOTE - CHIEF COMPLAINT QUOTE
states " I am having weakness and feel like passing out since almost 2 weeks and today while trying to go to the bathroom I started to have chest tightness and I passed out". CAD ,CABG, MI with stents, CVA. patient is hypotensive in triage.

## 2022-02-27 LAB
A1C WITH ESTIMATED AVERAGE GLUCOSE RESULT: 8.8 % — HIGH (ref 4–5.6)
ALBUMIN SERPL ELPH-MCNC: 3.9 G/DL — SIGNIFICANT CHANGE UP (ref 3.3–5)
ALP SERPL-CCNC: 53 U/L — SIGNIFICANT CHANGE UP (ref 40–120)
ALT FLD-CCNC: 18 U/L — SIGNIFICANT CHANGE UP (ref 4–41)
ANION GAP SERPL CALC-SCNC: 11 MMOL/L — SIGNIFICANT CHANGE UP (ref 7–14)
APTT BLD: 130.9 SEC — CRITICAL HIGH (ref 27–36.3)
APTT BLD: 82.5 SEC — HIGH (ref 27–36.3)
APTT BLD: >200 SEC — CRITICAL HIGH (ref 27–36.3)
AST SERPL-CCNC: 29 U/L — SIGNIFICANT CHANGE UP (ref 4–40)
BILIRUB SERPL-MCNC: 0.6 MG/DL — SIGNIFICANT CHANGE UP (ref 0.2–1.2)
BUN SERPL-MCNC: 44 MG/DL — HIGH (ref 7–23)
CALCIUM SERPL-MCNC: 9.3 MG/DL — SIGNIFICANT CHANGE UP (ref 8.4–10.5)
CHLORIDE SERPL-SCNC: 102 MMOL/L — SIGNIFICANT CHANGE UP (ref 98–107)
CHOLEST SERPL-MCNC: 113 MG/DL — SIGNIFICANT CHANGE UP
CK MB BLD-MCNC: 2.7 % — HIGH (ref 0–2.5)
CK MB BLD-MCNC: 3.4 % — HIGH (ref 0–2.5)
CK MB CFR SERPL CALC: 10.9 NG/ML — HIGH
CK MB CFR SERPL CALC: 7.4 NG/ML — HIGH
CK SERPL-CCNC: 279 U/L — HIGH (ref 30–200)
CK SERPL-CCNC: 317 U/L — HIGH (ref 30–200)
CO2 SERPL-SCNC: 22 MMOL/L — SIGNIFICANT CHANGE UP (ref 22–31)
CREAT SERPL-MCNC: 2.15 MG/DL — HIGH (ref 0.5–1.3)
ESTIMATED AVERAGE GLUCOSE: 206 — SIGNIFICANT CHANGE UP
GLUCOSE BLDC GLUCOMTR-MCNC: 127 MG/DL — HIGH (ref 70–99)
GLUCOSE BLDC GLUCOMTR-MCNC: 163 MG/DL — HIGH (ref 70–99)
GLUCOSE BLDC GLUCOMTR-MCNC: 191 MG/DL — HIGH (ref 70–99)
GLUCOSE BLDC GLUCOMTR-MCNC: 193 MG/DL — HIGH (ref 70–99)
GLUCOSE SERPL-MCNC: 214 MG/DL — HIGH (ref 70–99)
HCT VFR BLD CALC: 39.2 % — SIGNIFICANT CHANGE UP (ref 39–50)
HDLC SERPL-MCNC: 51 MG/DL — SIGNIFICANT CHANGE UP
HGB BLD-MCNC: 12.8 G/DL — LOW (ref 13–17)
LIPID PNL WITH DIRECT LDL SERPL: 53 MG/DL — SIGNIFICANT CHANGE UP
MAGNESIUM SERPL-MCNC: 2.4 MG/DL — SIGNIFICANT CHANGE UP (ref 1.6–2.6)
MCHC RBC-ENTMCNC: 29.8 PG — SIGNIFICANT CHANGE UP (ref 27–34)
MCHC RBC-ENTMCNC: 32.7 GM/DL — SIGNIFICANT CHANGE UP (ref 32–36)
MCV RBC AUTO: 91.4 FL — SIGNIFICANT CHANGE UP (ref 80–100)
NON HDL CHOLESTEROL: 62 MG/DL — SIGNIFICANT CHANGE UP
NRBC # BLD: 0 /100 WBCS — SIGNIFICANT CHANGE UP
NRBC # FLD: 0 K/UL — SIGNIFICANT CHANGE UP
PHOSPHATE SERPL-MCNC: 3.9 MG/DL — SIGNIFICANT CHANGE UP (ref 2.5–4.5)
PLATELET # BLD AUTO: 179 K/UL — SIGNIFICANT CHANGE UP (ref 150–400)
POTASSIUM SERPL-MCNC: 5.2 MMOL/L — SIGNIFICANT CHANGE UP (ref 3.5–5.3)
POTASSIUM SERPL-SCNC: 5.2 MMOL/L — SIGNIFICANT CHANGE UP (ref 3.5–5.3)
PROT SERPL-MCNC: 7.1 G/DL — SIGNIFICANT CHANGE UP (ref 6–8.3)
RBC # BLD: 4.29 M/UL — SIGNIFICANT CHANGE UP (ref 4.2–5.8)
RBC # FLD: 14.2 % — SIGNIFICANT CHANGE UP (ref 10.3–14.5)
SODIUM SERPL-SCNC: 135 MMOL/L — SIGNIFICANT CHANGE UP (ref 135–145)
T3 SERPL-MCNC: 66 NG/DL — LOW (ref 80–200)
T4 AB SER-ACNC: 5.41 UG/DL — SIGNIFICANT CHANGE UP (ref 5.1–13)
TRIGL SERPL-MCNC: 46 MG/DL — SIGNIFICANT CHANGE UP
TROPONIN T, HIGH SENSITIVITY RESULT: 444 NG/L — CRITICAL HIGH
TSH SERPL-MCNC: 1.71 UIU/ML — SIGNIFICANT CHANGE UP (ref 0.27–4.2)
WBC # BLD: 11.14 K/UL — HIGH (ref 3.8–10.5)
WBC # FLD AUTO: 11.14 K/UL — HIGH (ref 3.8–10.5)

## 2022-02-27 PROCEDURE — 99222 1ST HOSP IP/OBS MODERATE 55: CPT

## 2022-02-27 PROCEDURE — 93306 TTE W/DOPPLER COMPLETE: CPT | Mod: 26

## 2022-02-27 PROCEDURE — 71045 X-RAY EXAM CHEST 1 VIEW: CPT | Mod: 26

## 2022-02-27 RX ORDER — HYDRALAZINE HCL 50 MG
25 TABLET ORAL THREE TIMES A DAY
Refills: 0 | Status: DISCONTINUED | OUTPATIENT
Start: 2022-02-27 | End: 2022-03-01

## 2022-02-27 RX ORDER — PANTOPRAZOLE SODIUM 20 MG/1
40 TABLET, DELAYED RELEASE ORAL
Refills: 0 | Status: DISCONTINUED | OUTPATIENT
Start: 2022-02-27 | End: 2022-03-05

## 2022-02-27 RX ORDER — GABAPENTIN 400 MG/1
200 CAPSULE ORAL
Refills: 0 | Status: DISCONTINUED | OUTPATIENT
Start: 2022-02-27 | End: 2022-03-05

## 2022-02-27 RX ORDER — ISOSORBIDE DINITRATE 5 MG/1
10 TABLET ORAL THREE TIMES A DAY
Refills: 0 | Status: DISCONTINUED | OUTPATIENT
Start: 2022-02-27 | End: 2022-03-05

## 2022-02-27 RX ORDER — FUROSEMIDE 40 MG
20 TABLET ORAL DAILY
Refills: 0 | Status: DISCONTINUED | OUTPATIENT
Start: 2022-02-27 | End: 2022-03-01

## 2022-02-27 RX ORDER — HYDRALAZINE HCL 50 MG
10 TABLET ORAL THREE TIMES A DAY
Refills: 0 | Status: DISCONTINUED | OUTPATIENT
Start: 2022-02-27 | End: 2022-02-27

## 2022-02-27 RX ORDER — ACETAMINOPHEN 500 MG
650 TABLET ORAL ONCE
Refills: 0 | Status: COMPLETED | OUTPATIENT
Start: 2022-02-27 | End: 2022-02-27

## 2022-02-27 RX ORDER — ATORVASTATIN CALCIUM 80 MG/1
80 TABLET, FILM COATED ORAL AT BEDTIME
Refills: 0 | Status: DISCONTINUED | OUTPATIENT
Start: 2022-02-27 | End: 2022-03-05

## 2022-02-27 RX ADMIN — HEPARIN SODIUM 10 UNIT(S)/HR: 5000 INJECTION INTRAVENOUS; SUBCUTANEOUS at 04:08

## 2022-02-27 RX ADMIN — GABAPENTIN 200 MILLIGRAM(S): 400 CAPSULE ORAL at 17:38

## 2022-02-27 RX ADMIN — Medication 1: at 12:09

## 2022-02-27 RX ADMIN — CARVEDILOL PHOSPHATE 6.25 MILLIGRAM(S): 80 CAPSULE, EXTENDED RELEASE ORAL at 05:28

## 2022-02-27 RX ADMIN — PANTOPRAZOLE SODIUM 40 MILLIGRAM(S): 20 TABLET, DELAYED RELEASE ORAL at 05:28

## 2022-02-27 RX ADMIN — Medication 10 MILLIGRAM(S): at 14:48

## 2022-02-27 RX ADMIN — Medication 1: at 08:15

## 2022-02-27 RX ADMIN — HEPARIN SODIUM 8 UNIT(S)/HR: 5000 INJECTION INTRAVENOUS; SUBCUTANEOUS at 21:19

## 2022-02-27 RX ADMIN — Medication 10 MILLIGRAM(S): at 00:05

## 2022-02-27 RX ADMIN — Medication 20 MILLIGRAM(S): at 05:27

## 2022-02-27 RX ADMIN — ISOSORBIDE DINITRATE 10 MILLIGRAM(S): 5 TABLET ORAL at 11:04

## 2022-02-27 RX ADMIN — TICAGRELOR 90 MILLIGRAM(S): 90 TABLET ORAL at 17:32

## 2022-02-27 RX ADMIN — CARVEDILOL PHOSPHATE 6.25 MILLIGRAM(S): 80 CAPSULE, EXTENDED RELEASE ORAL at 17:31

## 2022-02-27 RX ADMIN — Medication 25 MILLIGRAM(S): at 21:22

## 2022-02-27 RX ADMIN — TICAGRELOR 90 MILLIGRAM(S): 90 TABLET ORAL at 05:28

## 2022-02-27 RX ADMIN — CHLORHEXIDINE GLUCONATE 1 APPLICATION(S): 213 SOLUTION TOPICAL at 08:16

## 2022-02-27 RX ADMIN — Medication 650 MILLIGRAM(S): at 06:30

## 2022-02-27 RX ADMIN — Medication 650 MILLIGRAM(S): at 15:20

## 2022-02-27 RX ADMIN — Medication 650 MILLIGRAM(S): at 17:02

## 2022-02-27 RX ADMIN — Medication 650 MILLIGRAM(S): at 05:28

## 2022-02-27 RX ADMIN — ISOSORBIDE DINITRATE 10 MILLIGRAM(S): 5 TABLET ORAL at 17:31

## 2022-02-27 RX ADMIN — GABAPENTIN 200 MILLIGRAM(S): 400 CAPSULE ORAL at 05:27

## 2022-02-27 RX ADMIN — Medication 81 MILLIGRAM(S): at 14:48

## 2022-02-27 RX ADMIN — ATORVASTATIN CALCIUM 80 MILLIGRAM(S): 80 TABLET, FILM COATED ORAL at 21:22

## 2022-02-27 NOTE — PROGRESS NOTE ADULT - SUBJECTIVE AND OBJECTIVE BOX
Subjective/Objective:   denies Chest discomfort and sob. Compliant  of left lateral below the knee to foot pain. b/l pedal pulses + with doppler. Left femoral intra aortic ballon pump intact, Aug Blood Pressure 130-140    Overnight event: no issues   Tele event:NSR    MEDICATIONS    aspirin enteric coated 81 milliGRAM(s) Oral daily  carvedilol 6.25 milliGRAM(s) Oral every 12 hours  furosemide    Tablet 20 milliGRAM(s) Oral daily  heparin  Infusion 1400 Unit(s)/Hr IV Continuous <Continuous>  ticagrelor 90 milliGRAM(s) Oral every 12 hours  gabapentin 200 milliGRAM(s) Oral two times a day  pantoprazole    Tablet 40 milliGRAM(s) Oral before breakfast  atorvastatin 80 milliGRAM(s) Oral at bedtime  dextrose 40% Gel 15 Gram(s) Oral once  dextrose 50% Injectable 25 Gram(s) IV Push once  dextrose 50% Injectable 12.5 Gram(s) IV Push once  dextrose 50% Injectable 25 Gram(s) IV Push once  glucagon  Injectable 1 milliGRAM(s) IntraMuscular once  insulin lispro (ADMELOG) corrective regimen sliding scale   SubCutaneous three times a day before meals  insulin lispro (ADMELOG) corrective regimen sliding scale   SubCutaneous at bedtime  chlorhexidine 4% Liquid 1 Application(s) Topical <User Schedule>  dextrose 5%. 1000 milliLiter(s) IV Continuous <Continuous>  dextrose 5%. 1000 milliLiter(s) IV Continuous <Continuous>          REVIEW OF SYSTEMS    General: no fatigue/malaise, weight loss/gain.  Skin: no rashes.  Ophthalmologic: no blurred vision, no loss of vision. 	  ENT: no sore throat, rhinorrhea, sinus congestion.  Cardiovascular:no chest pain ,no palpitation,no dizziness,no diaphoresis,no edema  Respiratory: no SOB, cough or wheeze.  Gastrointestinal:  no N/V/D, no melena/hematemesis/hematochezia.  Genitourinary: no dysuria/hesitancy or hematuria.  Musculoskeletal: no myalgias, left lateral side of knee down to feet pain   Neurological: no changes in vision or hearing, no lightheadedness/dizziness, no syncope/near syncope	  Psychiatric: no unusual stress/anxiety      	    ICU Vital Signs Last 24 Hrs  T(C): 36.6 (2022 08:00), Max: 36.7 (2022 06:00)  T(F): 97.9 (2022 08:00), Max: 98 (2022 06:00)  HR: 68 (2022 07:00) (62 - 153)  BP: 141/82 (2022 00:00) (109/49 - 184/99)  BP(mean): 101 (2022 00:00) (73 - 124)  RR: 10 (2022 07:) (10 - 21)  SpO2: 100% (:) (95% - 100%)      PHYSICAL EXAMINATION  Appearance: NAD, no distress  HEENT: Moist Mucous Membranes, Anicteric, PERRL, EOMI  Cardiovascular: Regular rate and rhythm, Normal S1 S2, No JVD, No murmurs  Respiratory: Lungs clear to auscultation. No rales, No rhonchi, No wheezing. No tenderness to palpation  Gastrointestinal:  Soft, Non-tender, + BS  Neurologic: Non-focal, A&Ox3  Skin: Warm and dry, No rashes, No ecchymosis, No cyanosis  Musculoskeletal: No clubbing, No cyanosis  Vascular: Peripheral pulses doppler 2+ bilaterally, left femoral intra aortic balloon pump site intact  Psychiatry: Mood & affect appropriate      	      I&O's Summary    2022 07:01  -  2022 07:00  --------------------------------------------------------  IN: 644 mL / OUT: 1525 mL / NET: -881 mL    	 	  LABORATORY VALUES	 	                          12.8   11.14 )-----------( 179      ( 2022 02:24 )             39.2           135  |  102  |  44<H>  ----------------------------<  214<H>  5.2   |  22  |  2.15<H>      139  |  106  |  38<H>  ----------------------------<  233<H>  5.9<H>   |  23  |  2.07<H>    Ca    9.3      2022 02:24  Ca    9.3      2022 14:40  Phos  3.9       Mg     2.40         TPro  7.1  /  Alb  3.9  /  TBili  0.6  /  DBili  x   /  AST  29  /  ALT  18  /  AlkPhos  53    TPro  7.8  /  Alb  4.5  /  TBili  0.4  /  DBili  x   /  AST  23  /  ALT  21  /  AlkPhos  65      LIVER FUNCTIONS - ( 2022 02:24 )  Alb: 3.9 g/dL / Pro: 7.1 g/dL / ALK PHOS: 53 U/L / ALT: 18 U/L / AST: 29 U/L / GGT: x           Activated Partial Thromboplastin Time: >200.0 sec ( @ 02:24)    CARDIAC MARKERS:  Creatine Kinase, Serum: 317 U/L ( @ 02:24)       @ 02:26  Cholesterol, Serum - 113  Direct LDL- --  HDL Cholesterol, Serum- 51  Triglycerides, Serum- 46    T4, Serum: 5.41 ug/dL ( @ 02:26)      Urinalysis Basic - ( 2022 21:27 )    Color: Colorless / Appearance: Clear / S.010 / pH: x  Gluc: x / Ketone: Negative  / Bili: Negative / Urobili: <2 mg/dL   Blood: x / Protein: Negative / Nitrite: Negative   Leuk Esterase: Negative / RBC: 16 /HPF / WBC 0 /HPF   Sq Epi: x / Non Sq Epi: 0 /HPF / Bacteria: Negative      CAPILLARY BLOOD GLUCOSE      POCT Blood Glucose.: 74 mg/dL (2022 20:24)        EKG:  Diagnostic testing:  cath:  < from: Cardiac Catheterization (22 @ 16:56) >  VENTRICLES: No left ventriculogram was performed. Bedside POCUS in cath lab suggest moderate LV systolic dysfunction (LVEF approximately 35%).  CORONARY VESSELS: The coronary circulation is right dominant.  LM:   --  LM: This vessel was not injected.  RCA:   --  RCA: This vessel was not injected.  GRAFTS:   --  Graft to the mid LAD: The graft was a LIMA. The graft was not injected.  --  Graft to the 1st obtuse marginal: The graft was a saphenous vein graft from the aorta. There was a discrete 70 % stenosis in the proximal third of the graft, at the site of a prior stent, within the stented segment.There was ROQUE grade 3 flow through the graft (brisk flow). There was adiscrete 95 % stenosis in the middle third of the graft. There was TIMIgrade 3 flow through the graft (brisk flow). This is a likely culprit forthe patient's clinical presentation.  SUMMARY:  1ST LESION INTERVENTIONS: A successful balloon angioplasty with stent andintravascular ultrasound was performed on the 95 % lesion in the middle third of the saphenous vein graft from the aorta to the 1st obtuse marginal. Following intervention there was a 1 % residual stenosis.  2ND LESION INTERVENTIONS: A balloon angioplasty with intravascular ultrasound was performed on the 70 % lesion in the proximal third of thesaphenous vein graft from the aorta to the 1st obtuse marginal. Following intervention there was a 20 % residual stenosis.  DIAGNOSTIC RECOMMENDATIONS: PCI of mid and proximal segments of SVG to OM1  for treatment of unstable angina/NSTEMI.  INTERVENTIONAL RECOMMENDATIONS: Continue aspirin, 81 mg, PO, daily. Addticagrelor 90 mg twice daily. Patient management should include aggressivemedical therapy and close monitoring of BUN and creatinine. Continue IABPfor 24-48 hours.    < end of copied text >  echo:< from: Transthoracic Echocardiogram (19 @ 09:04) >  Dimensions:     Normal Values:  LA:     4.2 cm    2.0 - 4.0 cm  Ao:     3.5 cm    2.0 - 3.8 cm  SEPTUM: 0.6 cm    0.6 - 1.2 cm  PWT:    1.0 cm    0.6 - 1.1 cm  LVIDd:  4.8 cm    3.0 - 5.6 cm  LVIDs:    ---     1.8 - 4.0 cm  Derived Variables:  LVMI: 68 g/m2  RWT: 0.41  Ejection Fraction (Visual Estimate): 45-50 %  ------------------------------------------------------------------------  OBSERVATIONS:  Mitral Valve: Mitral annular calcification and calcifiedmitral leaflets with normal diastolic opening. Mild mitral regurgitation.  Aortic Root: Normal aortic root.  Aortic Valve: Calcified trileaflet aortic valve with normalopening.  Left Atrium: Mildly dilated left atrium.  LA volume index =36 cc/m2.  Left Ventricle: Endocardium not well visualized;hypokinesis of the inferolateral wall.   Normal leftventricular internal dimensions and wall thicknesses. Mild diastolic dysfunction (Stage I).  Right Heart: Normal right atrium. Normal right ventricular size and function. Normal tricuspid valve. Normal pulmonic valve.  Pericardium/Pleura Normal pericardium with no pericardial effusion.    < end of copied text >        Assessment and Plan:  88M PMH CAD/MI (CABG + Stents X 5), PAD s/p Right LE Stent, HTN, HLD, DMT2, CKD, CVA (no residual), presented with near syncope and Chest pain, STEMI with ROCIO in III + AVR s/p urgent PCI and  GEMMA to 95%  mSVG to OM1 and POBA to pSVG-OM1.  IABP placed for coronary artery perfusion.          NEURO:  #Near syncopal episode may have been vasovagal    - Currently A&Ox3  - Continue monitor  - Fall risk protocol     CV:  #NSTEMI (+trops, +EKG changes) ,+ trop | chest discomfort  - loaded with 325 mg ASA, 180mg Ticagrelor/nitroglycerin 0.4mg, heparin bolus w. nomogram  - s/p LHC with GEMMA to mSVG to OM1 and POBA to pSVG-OM1  - Cont w.  ASA 81, Ticagrelor 90mg BID, lipitor 80mg ,coreg 6.25mg  - POCUS in cath showed EF 35%m, LVEDP 27,  - develop sob during procedure found to have crackles /wheezing  in cath lab, lasix 40mg IVP and solumedrol and Duoneb with good response  - IABP 1:1 ratio  - Continue heparin gtt full AC for IABP prophylaxis   - Hold  ACE for now  - TTE to evaluate LV Function today  - DASH/CC Diet  -trand CK/Trp    #Hx CAD s/p 5 stents w. last stent placed in 2019  - c/w DAPT ASA and Brilinta, coreg 6.25mg Lipitor  80mg -   - cont. ACE-I (wait until AM labs return)   - RICHARD  in 2019 indicated EF 45-50% w. hypokinesis of inferolateral wall  - Echo today    CHrEF   - POCUS in cath lab showed Ef 35%   -Crackles/wheezing during angioplasty s/p lasix 40mg IV / solumedrol   - c/w home dose  lasix 20mg PO daily  - echo today    PULM:  -No active issues    RENAL:  #CKD stage 3   - SCreat on admission 2, today 2.15  -Strict I+O- UO 1525cc/12 hrs, net neg 881cc  - Check Electrolytes and replenish to maintain K >4 and Mag >2  - Avoid nephrotoxic drugs    GI:  #Diet: DASH/TLC w. consistent  carbs     ENDO:  #DM2  -  HbA1c 8.8  -c/w  Insulin Sliding Scale    #HLD  Lipid panel WNL  c/w lipitor 80mg PO daily for ACS      HEMATOLOGIC:  #mild normochromic normocytic anemia  - Monitor for now     #DVT prophylaxis   not needed as patient on DAPT & heparin gtt    ID:  -No active issues    SKIN:  #Lines:   Left femoral IABP     Subjective/Objective:   denies Chest discomfort and sob. Compliant  of left lateral below the knee to foot pain. b/l pedal pulses + with doppler. Left femoral intra aortic ballon pump intact, Aug Blood Pressure 130-140    Overnight event: no issues   Tele event:NSR    MEDICATIONS    aspirin enteric coated 81 milliGRAM(s) Oral daily  carvedilol 6.25 milliGRAM(s) Oral every 12 hours  furosemide    Tablet 20 milliGRAM(s) Oral daily  heparin  Infusion 1400 Unit(s)/Hr IV Continuous <Continuous>  ticagrelor 90 milliGRAM(s) Oral every 12 hours  gabapentin 200 milliGRAM(s) Oral two times a day  pantoprazole    Tablet 40 milliGRAM(s) Oral before breakfast  atorvastatin 80 milliGRAM(s) Oral at bedtime  dextrose 40% Gel 15 Gram(s) Oral once  dextrose 50% Injectable 25 Gram(s) IV Push once  dextrose 50% Injectable 12.5 Gram(s) IV Push once  dextrose 50% Injectable 25 Gram(s) IV Push once  glucagon  Injectable 1 milliGRAM(s) IntraMuscular once  insulin lispro (ADMELOG) corrective regimen sliding scale   SubCutaneous three times a day before meals  insulin lispro (ADMELOG) corrective regimen sliding scale   SubCutaneous at bedtime  chlorhexidine 4% Liquid 1 Application(s) Topical <User Schedule>  dextrose 5%. 1000 milliLiter(s) IV Continuous <Continuous>  dextrose 5%. 1000 milliLiter(s) IV Continuous <Continuous>          REVIEW OF SYSTEMS    General: no fatigue/malaise, weight loss/gain.  Skin: no rashes.  Ophthalmologic: no blurred vision, no loss of vision. 	  ENT: no sore throat, rhinorrhea, sinus congestion.  Cardiovascular:no chest pain ,no palpitation,no dizziness,no diaphoresis,no edema  Respiratory: no SOB, cough or wheeze.  Gastrointestinal:  no N/V/D, no melena/hematemesis/hematochezia.  Genitourinary: no dysuria/hesitancy or hematuria.  Musculoskeletal: no myalgias, left lateral side of knee down to feet pain   Neurological: no changes in vision or hearing, no lightheadedness/dizziness, no syncope/near syncope	  Psychiatric: no unusual stress/anxiety      	    ICU Vital Signs Last 24 Hrs  T(C): 36.6 (2022 08:00), Max: 36.7 (2022 06:00)  T(F): 97.9 (2022 08:00), Max: 98 (2022 06:00)  HR: 68 (2022 07:00) (62 - 153)  BP: 141/82 (2022 00:00) (109/49 - 184/99)  BP(mean): 101 (2022 00:00) (73 - 124)  RR: 10 (2022 07:) (10 - 21)  SpO2: 100% (:) (95% - 100%)      PHYSICAL EXAMINATION  Appearance: NAD, no distress  HEENT: Moist Mucous Membranes, Anicteric, PERRL, EOMI  Cardiovascular: Regular rate and rhythm, Normal S1 S2, No JVD, No murmurs  Respiratory: Lungs clear to auscultation. No rales, No rhonchi, No wheezing. No tenderness to palpation  Gastrointestinal:  Soft, Non-tender, + BS  Neurologic: Non-focal, A&Ox3  Skin: Warm and dry, No rashes, No ecchymosis, No cyanosis  Musculoskeletal: No clubbing, No cyanosis  Vascular: Peripheral pulses doppler 2+ bilaterally, left femoral intra aortic balloon pump site intact  Psychiatry: Mood & affect appropriate      	      I&O's Summary    2022 07:01  -  2022 07:00  --------------------------------------------------------  IN: 644 mL / OUT: 1525 mL / NET: -881 mL    	 	  LABORATORY VALUES	 	                          12.8   11.14 )-----------( 179      ( 2022 02:24 )             39.2           135  |  102  |  44<H>  ----------------------------<  214<H>  5.2   |  22  |  2.15<H>      139  |  106  |  38<H>  ----------------------------<  233<H>  5.9<H>   |  23  |  2.07<H>    Ca    9.3      2022 02:24  Ca    9.3      2022 14:40  Phos  3.9       Mg     2.40         TPro  7.1  /  Alb  3.9  /  TBili  0.6  /  DBili  x   /  AST  29  /  ALT  18  /  AlkPhos  53    TPro  7.8  /  Alb  4.5  /  TBili  0.4  /  DBili  x   /  AST  23  /  ALT  21  /  AlkPhos  65      LIVER FUNCTIONS - ( 2022 02:24 )  Alb: 3.9 g/dL / Pro: 7.1 g/dL / ALK PHOS: 53 U/L / ALT: 18 U/L / AST: 29 U/L / GGT: x           Activated Partial Thromboplastin Time: >200.0 sec ( @ 02:24)    CARDIAC MARKERS:  Creatine Kinase, Serum: 317 U/L ( @ 02:24)       @ 02:26  Cholesterol, Serum - 113  Direct LDL- --  HDL Cholesterol, Serum- 51  Triglycerides, Serum- 46    T4, Serum: 5.41 ug/dL ( @ 02:26)      Urinalysis Basic - ( 2022 21:27 )    Color: Colorless / Appearance: Clear / S.010 / pH: x  Gluc: x / Ketone: Negative  / Bili: Negative / Urobili: <2 mg/dL   Blood: x / Protein: Negative / Nitrite: Negative   Leuk Esterase: Negative / RBC: 16 /HPF / WBC 0 /HPF   Sq Epi: x / Non Sq Epi: 0 /HPF / Bacteria: Negative      CAPILLARY BLOOD GLUCOSE      POCT Blood Glucose.: 74 mg/dL (2022 20:24)        EKG:  Diagnostic testing:  cath:  < from: Cardiac Catheterization (22 @ 16:56) >  VENTRICLES: No left ventriculogram was performed. Bedside POCUS in cath lab suggest moderate LV systolic dysfunction (LVEF approximately 35%).  CORONARY VESSELS: The coronary circulation is right dominant.  LM:   --  LM: This vessel was not injected.  RCA:   --  RCA: This vessel was not injected.  GRAFTS:   --  Graft to the mid LAD: The graft was a LIMA. The graft was not injected.  --  Graft to the 1st obtuse marginal: The graft was a saphenous vein graft from the aorta. There was a discrete 70 % stenosis in the proximal third of the graft, at the site of a prior stent, within the stented segment.There was ROQUE grade 3 flow through the graft (brisk flow). There was adiscrete 95 % stenosis in the middle third of the graft. There was TIMIgrade 3 flow through the graft (brisk flow). This is a likely culprit forthe patient's clinical presentation.  SUMMARY:  1ST LESION INTERVENTIONS: A successful balloon angioplasty with stent andintravascular ultrasound was performed on the 95 % lesion in the middle third of the saphenous vein graft from the aorta to the 1st obtuse marginal. Following intervention there was a 1 % residual stenosis.  2ND LESION INTERVENTIONS: A balloon angioplasty with intravascular ultrasound was performed on the 70 % lesion in the proximal third of thesaphenous vein graft from the aorta to the 1st obtuse marginal. Following intervention there was a 20 % residual stenosis.  DIAGNOSTIC RECOMMENDATIONS: PCI of mid and proximal segments of SVG to OM1  for treatment of unstable angina/NSTEMI.  INTERVENTIONAL RECOMMENDATIONS: Continue aspirin, 81 mg, PO, daily. Addticagrelor 90 mg twice daily. Patient management should include aggressivemedical therapy and close monitoring of BUN and creatinine. Continue IABPfor 24-48 hours.    < end of copied text >  echo:< from: Transthoracic Echocardiogram (19 @ 09:04) >  Dimensions:     Normal Values:  LA:     4.2 cm    2.0 - 4.0 cm  Ao:     3.5 cm    2.0 - 3.8 cm  SEPTUM: 0.6 cm    0.6 - 1.2 cm  PWT:    1.0 cm    0.6 - 1.1 cm  LVIDd:  4.8 cm    3.0 - 5.6 cm  LVIDs:    ---     1.8 - 4.0 cm  Derived Variables:  LVMI: 68 g/m2  RWT: 0.41  Ejection Fraction (Visual Estimate): 45-50 %  ------------------------------------------------------------------------  OBSERVATIONS:  Mitral Valve: Mitral annular calcification and calcifiedmitral leaflets with normal diastolic opening. Mild mitral regurgitation.  Aortic Root: Normal aortic root.  Aortic Valve: Calcified trileaflet aortic valve with normalopening.  Left Atrium: Mildly dilated left atrium.  LA volume index =36 cc/m2.  Left Ventricle: Endocardium not well visualized;hypokinesis of the inferolateral wall.   Normal leftventricular internal dimensions and wall thicknesses. Mild diastolic dysfunction (Stage I).  Right Heart: Normal right atrium. Normal right ventricular size and function. Normal tricuspid valve. Normal pulmonic valve.  Pericardium/Pleura Normal pericardium with no pericardial effusion.    < end of copied text >        Assessment and Plan:  88M PMH CAD/MI (CABG + Stents X 5), PAD s/p Right LE Stent, HTN, HLD, DMT2, CKD, CVA (no residual), presented with near syncope and Chest pain, STEMI with ROCIO in III + AVR s/p urgent PCI and  GEMMA to 95%  mSVG to OM1 and POBA to pSVG-OM1.  IABP placed for coronary artery perfusion.          NEURO:  #Near syncopal episode may have been vasovagal    - Currently A&Ox3  - Continue monitor  - Fall risk protocol     CV:  #NSTEMI (+trops, +EKG changes) ,+ trop | chest discomfort  - loaded with 325 mg ASA, 180mg Ticagrelor/nitroglycerin 0.4mg, heparin bolus w. nomogram  - s/p LHC with GEMMA to mSVG to OM1 and POBA to pSVG-OM1  - Cont w.  ASA 81, Ticagrelor 90mg BID, lipitor 80mg ,coreg 6.25mg  - POCUS in cath showed EF 35%m, LVEDP 27,  - develop sob during procedure found to have crackles /wheezing  in cath lab, lasix 40mg IVP and solumedrol and Duoneb with good response  - IABP 1:1 ratio, Aug Blood Pressure 1301-40  - Continue heparin gtt full AC for IABP prophylaxis - hold at 0500 am on   - Hold  ACE for now, start on hydralazine /isordil for afterload reduction  - TTE to evaluate LV Function today  - DASH/CC Diet  -trend CK/Trp    #Hx CAD s/p 5 stents w. last stent placed in 2019  - c/w DAPT ASA and Brilinta, coreg 6.25mg Lipitor  80mg -   - cont. ACE-I (wait until AM labs return)   - RICHARD  in  indicated EF 45-50% w. hypokinesis of inferolateral wall  - Echo today    diastolic CHF  - POCUS in cath lab showed EF 35%   -Crackles/wheezing during angioplasty s/p lasix 40mg IV / solumedrol   - c/w home dose  lasix 20mg PO daily  - echo today    PULM:  -No active issues    RENAL:  #CKD stage 3   - SCreat on admission 2, today 2.15  -Strict I+O- UO 1525cc/12 hrs, net neg 881cc  - Check Electrolytes and replenish to maintain K >4 and Mag >2  - Avoid nephrotoxic drugs    GI:  #Diet: DASH/TLC w. consistent  carbs     ENDO:  #DM2  -  HbA1c 8.8  -c/w  Insulin Sliding Scale    #HLD  Lipid panel WNL  c/w lipitor 80mg PO daily for ACS      HEMATOLOGIC:  #mild normochromic normocytic anemia  - Monitor for now     #DVT prophylaxis   not needed as patient on DAPT & heparin gtt    ID:  -No active issues    SKIN:  #Lines:   Left femoral IABP     Subjective/Objective:   denies Chest discomfort and sob. Compliant  of left lateral below the knee to foot pain. b/l pedal pulses + with doppler. Left femoral intra aortic ballon pump intact, Aug Blood Pressure 130-140    Overnight event: no issues   Tele event:NSR    MEDICATIONS    aspirin enteric coated 81 milliGRAM(s) Oral daily  carvedilol 6.25 milliGRAM(s) Oral every 12 hours  furosemide    Tablet 20 milliGRAM(s) Oral daily  heparin  Infusion 1400 Unit(s)/Hr IV Continuous <Continuous>  ticagrelor 90 milliGRAM(s) Oral every 12 hours  gabapentin 200 milliGRAM(s) Oral two times a day  pantoprazole    Tablet 40 milliGRAM(s) Oral before breakfast  atorvastatin 80 milliGRAM(s) Oral at bedtime  dextrose 40% Gel 15 Gram(s) Oral once  dextrose 50% Injectable 25 Gram(s) IV Push once  dextrose 50% Injectable 12.5 Gram(s) IV Push once  dextrose 50% Injectable 25 Gram(s) IV Push once  glucagon  Injectable 1 milliGRAM(s) IntraMuscular once  insulin lispro (ADMELOG) corrective regimen sliding scale   SubCutaneous three times a day before meals  insulin lispro (ADMELOG) corrective regimen sliding scale   SubCutaneous at bedtime  chlorhexidine 4% Liquid 1 Application(s) Topical <User Schedule>  dextrose 5%. 1000 milliLiter(s) IV Continuous <Continuous>  dextrose 5%. 1000 milliLiter(s) IV Continuous <Continuous>          REVIEW OF SYSTEMS    General: no fatigue/malaise, weight loss/gain.  Skin: no rashes.  Ophthalmologic: no blurred vision, no loss of vision. 	  ENT: no sore throat, rhinorrhea, sinus congestion.  Cardiovascular:no chest pain ,no palpitation,no dizziness,no diaphoresis,no edema  Respiratory: no SOB, cough or wheeze.  Gastrointestinal:  no N/V/D, no melena/hematemesis/hematochezia.  Genitourinary: no dysuria/hesitancy or hematuria.  Musculoskeletal: no myalgias, left lateral side of knee down to feet pain   Neurological: no changes in vision or hearing, no lightheadedness/dizziness, no syncope/near syncope	  Psychiatric: no unusual stress/anxiety      	    ICU Vital Signs Last 24 Hrs  T(C): 36.6 (2022 08:00), Max: 36.7 (2022 06:00)  T(F): 97.9 (2022 08:00), Max: 98 (2022 06:00)  HR: 68 (2022 07:00) (62 - 153)  BP: 141/82 (2022 00:00) (109/49 - 184/99)  BP(mean): 101 (2022 00:00) (73 - 124)  RR: 10 (2022 07:) (10 - 21)  SpO2: 100% (:) (95% - 100%)      PHYSICAL EXAMINATION  Appearance: NAD, no distress  HEENT: Moist Mucous Membranes, Anicteric, PERRL, EOMI  Cardiovascular: Regular rate and rhythm, Normal S1 S2, No JVD, No murmurs  Respiratory: Lungs clear to auscultation. No rales, No rhonchi, No wheezing.  Gastrointestinal:  Soft, Non-tender, + BS,  No tenderness to palpation  Neurologic: Non-focal, A&Ox3  Skin: Warm and dry, No rashes, No ecchymosis, No cyanosis  Musculoskeletal: No clubbing, No cyanosis  Vascular: Peripheral pulses doppler 2+ bilaterally, left femoral intra aortic balloon pump site intact  Psychiatry: Mood & affect appropriate      	      I&O's Summary    2022 07:01  -  2022 07:00  --------------------------------------------------------  IN: 644 mL / OUT: 1525 mL / NET: -881 mL    	 	  LABORATORY VALUES	 	                          12.8   11.14 )-----------( 179      ( 2022 02:24 )             39.2           135  |  102  |  44<H>  ----------------------------<  214<H>  5.2   |  22  |  2.15<H>      139  |  106  |  38<H>  ----------------------------<  233<H>  5.9<H>   |  23  |  2.07<H>    Ca    9.3      2022 02:24  Ca    9.3      2022 14:40  Phos  3.9       Mg     2.40         TPro  7.1  /  Alb  3.9  /  TBili  0.6  /  DBili  x   /  AST  29  /  ALT  18  /  AlkPhos  53    TPro  7.8  /  Alb  4.5  /  TBili  0.4  /  DBili  x   /  AST  23  /  ALT  21  /  AlkPhos  65      LIVER FUNCTIONS - ( 2022 02:24 )  Alb: 3.9 g/dL / Pro: 7.1 g/dL / ALK PHOS: 53 U/L / ALT: 18 U/L / AST: 29 U/L / GGT: x           Activated Partial Thromboplastin Time: >200.0 sec ( @ 02:24)    CARDIAC MARKERS:  Creatine Kinase, Serum: 317 U/L ( @ 02:24)       @ 02:26  Cholesterol, Serum - 113  Direct LDL- --  HDL Cholesterol, Serum- 51  Triglycerides, Serum- 46    T4, Serum: 5.41 ug/dL ( @ 02:26)      Urinalysis Basic - ( 2022 21:27 )    Color: Colorless / Appearance: Clear / S.010 / pH: x  Gluc: x / Ketone: Negative  / Bili: Negative / Urobili: <2 mg/dL   Blood: x / Protein: Negative / Nitrite: Negative   Leuk Esterase: Negative / RBC: 16 /HPF / WBC 0 /HPF   Sq Epi: x / Non Sq Epi: 0 /HPF / Bacteria: Negative      CAPILLARY BLOOD GLUCOSE      POCT Blood Glucose.: 74 mg/dL (2022 20:24)        EKG:  Diagnostic testing:  cath:  < from: Cardiac Catheterization (22 @ 16:56) >  VENTRICLES: No left ventriculogram was performed. Bedside POCUS in cath lab suggest moderate LV systolic dysfunction (LVEF approximately 35%).  CORONARY VESSELS: The coronary circulation is right dominant.  LM:   --  LM: This vessel was not injected.  RCA:   --  RCA: This vessel was not injected.  GRAFTS:   --  Graft to the mid LAD: The graft was a LIMA. The graft was not injected.  --  Graft to the 1st obtuse marginal: The graft was a saphenous vein graft from the aorta. There was a discrete 70 % stenosis in the proximal third of the graft, at the site of a prior stent, within the stented segment.There was ROQUE grade 3 flow through the graft (brisk flow). There was adiscrete 95 % stenosis in the middle third of the graft. There was TIMIgrade 3 flow through the graft (brisk flow). This is a likely culprit forthe patient's clinical presentation.  SUMMARY:  1ST LESION INTERVENTIONS: A successful balloon angioplasty with stent andintravascular ultrasound was performed on the 95 % lesion in the middle third of the saphenous vein graft from the aorta to the 1st obtuse marginal. Following intervention there was a 1 % residual stenosis.  2ND LESION INTERVENTIONS: A balloon angioplasty with intravascular ultrasound was performed on the 70 % lesion in the proximal third of thesaphenous vein graft from the aorta to the 1st obtuse marginal. Following intervention there was a 20 % residual stenosis.  DIAGNOSTIC RECOMMENDATIONS: PCI of mid and proximal segments of SVG to OM1  for treatment of unstable angina/NSTEMI.  INTERVENTIONAL RECOMMENDATIONS: Continue aspirin, 81 mg, PO, daily. Addticagrelor 90 mg twice daily. Patient management should include aggressivemedical therapy and close monitoring of BUN and creatinine. Continue IABPfor 24-48 hours.    < end of copied text >  echo:< from: Transthoracic Echocardiogram (19 @ 09:04) >  Dimensions:     Normal Values:  LA:     4.2 cm    2.0 - 4.0 cm  Ao:     3.5 cm    2.0 - 3.8 cm  SEPTUM: 0.6 cm    0.6 - 1.2 cm  PWT:    1.0 cm    0.6 - 1.1 cm  LVIDd:  4.8 cm    3.0 - 5.6 cm  LVIDs:    ---     1.8 - 4.0 cm  Derived Variables:  LVMI: 68 g/m2  RWT: 0.41  Ejection Fraction (Visual Estimate): 45-50 %  ------------------------------------------------------------------------  OBSERVATIONS:  Mitral Valve: Mitral annular calcification and calcifiedmitral leaflets with normal diastolic opening. Mild mitral regurgitation.  Aortic Root: Normal aortic root.  Aortic Valve: Calcified trileaflet aortic valve with normalopening.  Left Atrium: Mildly dilated left atrium.  LA volume index =36 cc/m2.  Left Ventricle: Endocardium not well visualized;hypokinesis of the inferolateral wall.   Normal leftventricular internal dimensions and wall thicknesses. Mild diastolic dysfunction (Stage I).  Right Heart: Normal right atrium. Normal right ventricular size and function. Normal tricuspid valve. Normal pulmonic valve.  Pericardium/Pleura Normal pericardium with no pericardial effusion.    < end of copied text >        Assessment and Plan:  88M PMH CAD/MI (CABG + Stents X 5), PAD s/p Right LE Stent, HTN, HLD, DMT2, CKD, CVA (no residual), presented with near syncope and Chest pain, STEMI with ROCIO in III + AVR s/p urgent PCI and  GEMMA to 95%  mSVG to OM1 and POBA to pSVG-OM1.  IABP placed for coronary artery perfusion.          NEURO:  #Near syncopal episode may have been vasovagal    - Currently A&Ox3  - Continue monitor  - Fall risk protocol     CV:  #NSTEMI (+trops, +EKG changes) ,+ trop | chest discomfort  - loaded with 325 mg ASA, 180mg Ticagrelor/nitroglycerin 0.4mg, heparin bolus w. nomogram  - s/p LHC with GEMMA to mSVG to OM1 and POBA to pSVG-OM1  - Cont w.  ASA 81, Ticagrelor 90mg BID, lipitor 80mg ,coreg 6.25mg  - POCUS in cath showed EF 35%m, LVEDP 27,  - develop sob during procedure found to have crackles /wheezing  in cath lab, lasix 40mg IVP and solumedrol and Duoneb with good response  - IABP 1:1 ratio, Aug Blood Pressure 1301-40  - Continue heparin gtt full AC for IABP prophylaxis - hold at 0500 am on   - Hold  ACE for now, start on hydralazine /isordil for afterload reduction  - TTE to evaluate LV Function today  - DASH/CC Diet  -trend CK/Trp    #Hx CAD s/p 5 stents w. last stent placed in 2019  - c/w DAPT ASA and Brilinta, coreg 6.25mg Lipitor  80mg -   - cont. ACE-I (wait until AM labs return)   - RICHARD  in  indicated EF 45-50% w. hypokinesis of inferolateral wall  - Echo today    diastolic CHF  - POCUS in cath lab showed EF 35%   -Crackles/wheezing during angioplasty s/p lasix 40mg IV / solumedrol   - c/w home dose  lasix 20mg PO daily  - echo today    PULM:  -No active issues    RENAL:  #CKD stage 3   - SCreat on admission 2, today 2.15  -Strict I+O- UO 1525cc/12 hrs, net neg 881cc  - Check Electrolytes and replenish to maintain K >4 and Mag >2  - Avoid nephrotoxic drugs    GI:  #Diet: DASH/TLC w. consistent  carbs     ENDO:  #DM2  -  HbA1c 8.8  -c/w  Insulin Sliding Scale    #HLD  Lipid panel WNL  c/w lipitor 80mg PO daily for ACS      HEMATOLOGIC:  #mild normochromic normocytic anemia  - Monitor for now     #DVT prophylaxis   not needed as patient on DAPT & heparin gtt    ID:  -No active issues    SKIN:  #Lines:   Left femoral IABP     Subjective/Objective:   denies Chest discomfort and sob. Compliant  of left lateral below the knee to foot pain. b/l pedal pulses + with doppler. Left femoral intra aortic ballon pump intact, Aug Blood Pressure 130-140    Overnight event: no issues   Tele event:NSR    MEDICATIONS    aspirin enteric coated 81 milliGRAM(s) Oral daily  carvedilol 6.25 milliGRAM(s) Oral every 12 hours  furosemide    Tablet 20 milliGRAM(s) Oral daily  heparin  Infusion 1400 Unit(s)/Hr IV Continuous <Continuous>  ticagrelor 90 milliGRAM(s) Oral every 12 hours  gabapentin 200 milliGRAM(s) Oral two times a day  pantoprazole    Tablet 40 milliGRAM(s) Oral before breakfast  atorvastatin 80 milliGRAM(s) Oral at bedtime  dextrose 40% Gel 15 Gram(s) Oral once  dextrose 50% Injectable 25 Gram(s) IV Push once  dextrose 50% Injectable 12.5 Gram(s) IV Push once  dextrose 50% Injectable 25 Gram(s) IV Push once  glucagon  Injectable 1 milliGRAM(s) IntraMuscular once  insulin lispro (ADMELOG) corrective regimen sliding scale   SubCutaneous three times a day before meals  insulin lispro (ADMELOG) corrective regimen sliding scale   SubCutaneous at bedtime  chlorhexidine 4% Liquid 1 Application(s) Topical <User Schedule>  dextrose 5%. 1000 milliLiter(s) IV Continuous <Continuous>  dextrose 5%. 1000 milliLiter(s) IV Continuous <Continuous>          REVIEW OF SYSTEMS    General: no fatigue/malaise, weight loss/gain.  Skin: no rashes.  Ophthalmologic: no blurred vision, no loss of vision. 	  ENT: no sore throat, rhinorrhea, sinus congestion.  Cardiovascular:no chest pain ,no palpitation,no dizziness,no diaphoresis,no edema  Respiratory: no SOB, cough or wheeze.  Gastrointestinal:  no N/V/D, no melena/hematemesis/hematochezia.  Genitourinary: no dysuria/hesitancy or hematuria.  Musculoskeletal: no myalgias, left lateral side of knee down to feet pain   Neurological: no changes in vision or hearing, no lightheadedness/dizziness, no syncope/near syncope	  Psychiatric: no unusual stress/anxiety      	    ICU Vital Signs Last 24 Hrs  T(C): 36.6 (2022 08:00), Max: 36.7 (2022 06:00)  T(F): 97.9 (2022 08:00), Max: 98 (2022 06:00)  HR: 68 (2022 07:00) (62 - 153)  BP: 141/82 (2022 00:00) (109/49 - 184/99)  BP(mean): 101 (2022 00:00) (73 - 124)  RR: 10 (2022 07:) (10 - 21)  SpO2: 100% (:) (95% - 100%)      PHYSICAL EXAMINATION  Appearance: NAD, no distress  HEENT: Moist Mucous Membranes, Anicteric, PERRL, EOMI  Cardiovascular: Regular rate and rhythm, Normal S1 S2, No JVD, No murmurs  Respiratory: Lungs clear to auscultation. No rales, No rhonchi, No wheezing.  Gastrointestinal:  Soft, Non-tender, + BS,  No tenderness to palpation  Neurologic: Non-focal, A&Ox3  Skin: Warm and dry, No rashes, No ecchymosis, No cyanosis  Musculoskeletal: No clubbing, No cyanosis  Vascular: Peripheral pulses doppler 2+ bilaterally, left femoral intra aortic balloon pump site intact  Psychiatry: Mood & affect appropriate      	      I&O's Summary    2022 07:01  -  2022 07:00  --------------------------------------------------------  IN: 644 mL / OUT: 1525 mL / NET: -881 mL    	 	  LABORATORY VALUES	 	                          12.8   11.14 )-----------( 179      ( 2022 02:24 )             39.2           135  |  102  |  44<H>  ----------------------------<  214<H>  5.2   |  22  |  2.15<H>      139  |  106  |  38<H>  ----------------------------<  233<H>  5.9<H>   |  23  |  2.07<H>    Ca    9.3      2022 02:24  Ca    9.3      2022 14:40  Phos  3.9       Mg     2.40         TPro  7.1  /  Alb  3.9  /  TBili  0.6  /  DBili  x   /  AST  29  /  ALT  18  /  AlkPhos  53    TPro  7.8  /  Alb  4.5  /  TBili  0.4  /  DBili  x   /  AST  23  /  ALT  21  /  AlkPhos  65      LIVER FUNCTIONS - ( 2022 02:24 )  Alb: 3.9 g/dL / Pro: 7.1 g/dL / ALK PHOS: 53 U/L / ALT: 18 U/L / AST: 29 U/L / GGT: x           Activated Partial Thromboplastin Time: >200.0 sec ( @ 02:24)    CARDIAC MARKERS:  Creatine Kinase, Serum: 317 U/L ( @ 02:24)       @ 02:26  Cholesterol, Serum - 113  Direct LDL- --  HDL Cholesterol, Serum- 51  Triglycerides, Serum- 46    T4, Serum: 5.41 ug/dL ( @ 02:26)      Urinalysis Basic - ( 2022 21:27 )    Color: Colorless / Appearance: Clear / S.010 / pH: x  Gluc: x / Ketone: Negative  / Bili: Negative / Urobili: <2 mg/dL   Blood: x / Protein: Negative / Nitrite: Negative   Leuk Esterase: Negative / RBC: 16 /HPF / WBC 0 /HPF   Sq Epi: x / Non Sq Epi: 0 /HPF / Bacteria: Negative      CAPILLARY BLOOD GLUCOSE      POCT Blood Glucose.: 74 mg/dL (2022 20:24)        EKG:  Diagnostic testing:  cath:  < from: Cardiac Catheterization (22 @ 16:56) >  VENTRICLES: No left ventriculogram was performed. Bedside POCUS in cath lab suggest moderate LV systolic dysfunction (LVEF approximately 35%).  CORONARY VESSELS: The coronary circulation is right dominant.  LM:   --  LM: This vessel was not injected.  RCA:   --  RCA: This vessel was not injected.  GRAFTS:   --  Graft to the mid LAD: The graft was a LIMA. The graft was not injected.  --  Graft to the 1st obtuse marginal: The graft was a saphenous vein graft from the aorta. There was a discrete 70 % stenosis in the proximal third of the graft, at the site of a prior stent, within the stented segment.There was ROQUE grade 3 flow through the graft (brisk flow). There was adiscrete 95 % stenosis in the middle third of the graft. There was TIMIgrade 3 flow through the graft (brisk flow). This is a likely culprit forthe patient's clinical presentation.  SUMMARY:  1ST LESION INTERVENTIONS: A successful balloon angioplasty with stent andintravascular ultrasound was performed on the 95 % lesion in the middle third of the saphenous vein graft from the aorta to the 1st obtuse marginal. Following intervention there was a 1 % residual stenosis.  2ND LESION INTERVENTIONS: A balloon angioplasty with intravascular ultrasound was performed on the 70 % lesion in the proximal third of thesaphenous vein graft from the aorta to the 1st obtuse marginal. Following intervention there was a 20 % residual stenosis.  DIAGNOSTIC RECOMMENDATIONS: PCI of mid and proximal segments of SVG to OM1  for treatment of unstable angina/NSTEMI.  INTERVENTIONAL RECOMMENDATIONS: Continue aspirin, 81 mg, PO, daily. Addticagrelor 90 mg twice daily. Patient management should include aggressivemedical therapy and close monitoring of BUN and creatinine. Continue IABPfor 24-48 hours.    < end of copied text >  echo:< from: Transthoracic Echocardiogram (19 @ 09:04) >  Dimensions:     Normal Values:  LA:     4.2 cm    2.0 - 4.0 cm  Ao:     3.5 cm    2.0 - 3.8 cm  SEPTUM: 0.6 cm    0.6 - 1.2 cm  PWT:    1.0 cm    0.6 - 1.1 cm  LVIDd:  4.8 cm    3.0 - 5.6 cm  LVIDs:    ---     1.8 - 4.0 cm  Derived Variables:  LVMI: 68 g/m2  RWT: 0.41  Ejection Fraction (Visual Estimate): 45-50 %  ------------------------------------------------------------------------  OBSERVATIONS:  Mitral Valve: Mitral annular calcification and calcifiedmitral leaflets with normal diastolic opening. Mild mitral regurgitation.  Aortic Root: Normal aortic root.  Aortic Valve: Calcified trileaflet aortic valve with normalopening.  Left Atrium: Mildly dilated left atrium.  LA volume index =36 cc/m2.  Left Ventricle: Endocardium not well visualized;hypokinesis of the inferolateral wall.   Normal leftventricular internal dimensions and wall thicknesses. Mild diastolic dysfunction (Stage I).  Right Heart: Normal right atrium. Normal right ventricular size and function. Normal tricuspid valve. Normal pulmonic valve.  Pericardium/Pleura Normal pericardium with no pericardial effusion.    < end of copied text >        Assessment and Plan:  88M PMH CAD/MI (CABG + Stents X 5), PAD s/p Right LE Stent, HTN, HLD, DMT2, CKD, CVA (no residual), presented with near syncope and Chest pain, STEMI with ROCIO in III + AVR s/p urgent PCI and  GEMMA to 95%  mSVG to OM1 and POBA to pSVG-OM1.  IABP placed for coronary artery perfusion.          NEURO:  #Near syncopal episode may have been vasovagal    - Currently A&Ox3  - Continue monitor  - Fall risk protocol     CV:  #NSTEMI (+trops, +EKG changes) ,+ trop | chest discomfort  - loaded with 325 mg ASA, 180mg Ticagrelor/nitroglycerin 0.4mg, heparin bolus w. nomogram  - s/p LHC with GEMMA to mSVG to OM1 and POBA to pSVG-OM1  - Cont w.  ASA 81, Ticagrelor 90mg BID, lipitor 80mg ,coreg 6.25mg  - POCUS in cath showed EF 35%m, LVEDP 27,  - develop sob during procedure found to have crackles /wheezing  in cath lab, lasix 40mg IVP and solumedrol and Duoneb with good response  - IABP 1:1 ratio, Aug Blood Pressure 130-140, off aug Blood Pressure 112/47  - Continue heparin gtt full AC for IABP prophylaxis - hold at 0500 am on   - Hold  ACE for now, start on hydralazine /isordil for afterload reduction  - TTE to evaluate LV Function today  - DASH/CC Diet  -trend CK/Trp    #Hx CAD s/p 5 stents w. last stent placed in 2019  - c/w DAPT ASA and Brilinta, coreg 6.25mg Lipitor  80mg -   - cont. ACE-I (wait until AM labs return)   - RICHARD  in 2019 indicated EF 45-50% w. hypokinesis of inferolateral wall  - Echo today    diastolic CHF  - POCUS in cath lab showed EF 35%   -Crackles/wheezing during angioplasty s/p lasix 40mg IV / solumedrol   - c/w home dose  lasix 20mg PO daily  - echo today    PULM:  -No active issues    RENAL:  #CKD    - SCreat on admission 2, today 2.15  -Strict I+O- UO 1525cc/12 hrs, net neg 881cc  - Check Electrolytes and replenish to maintain K >4 and Mag >2  - Avoid nephrotoxic drugs  -nephrology consult in am    GI:  #Diet: DASH/TLC w. consistent  carbs     ENDO:  #DM2  -  HbA1c 8.8  -c/w  Insulin Sliding Scale    #HLD  Lipid panel WNL  c/w lipitor 80mg PO daily for ACS      HEMATOLOGIC:  #mild normochromic normocytic anemia  - Monitor for now     #DVT prophylaxis   not needed as patient on DAPT & heparin gtt    ID:  -No active issues    SKIN:  #Lines:   Left femoral IABP

## 2022-02-27 NOTE — PROGRESS NOTE ADULT - ATTENDING COMMENTS
88 year old man with history of CAD sp CABG 20-25 years ago, multiple PCI thereafter most recently 2019 PCI SVG->OM; PPM, RLE PAD with stents in place. 2/26/22 had syncope and chest pain. 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 IABP placed.    Meds:  Heparin ggt  ASA 81 mg daily  Brilinta 90 mg BID  Coreg 6.25 mg BID  Atorvastatin 80 mg daily  Lasix 20 mg daily    #Neuro- No active issues  #CV- STEMI with PCI to mSVG->OM and POBA proxSVG->OM  IABP in place for 24-48 hours  Continue heparin gtt; can hold tomorrow morning  Continue DAPT with ASA/Brilinta/Statin  Continue Coreg  Start Isordil 10 mg TID and hydralaznie 10 mg TID for afterload reduction  ACE/ARB on hold due to elevated Cr  Check TTE  #Renal- Follow Cr; will call outpatient provider to see baseline Cr  Continue to monitor  #Endo- Ha1c 8.8 now on sliding scale  #DVT ppx- on heparin gtt

## 2022-02-28 DIAGNOSIS — N17.9 ACUTE KIDNEY FAILURE, UNSPECIFIED: ICD-10-CM

## 2022-02-28 LAB
ALBUMIN SERPL ELPH-MCNC: 3.4 G/DL — SIGNIFICANT CHANGE UP (ref 3.3–5)
ALP SERPL-CCNC: 42 U/L — SIGNIFICANT CHANGE UP (ref 40–120)
ALT FLD-CCNC: 12 U/L — SIGNIFICANT CHANGE UP (ref 4–41)
ANION GAP SERPL CALC-SCNC: 12 MMOL/L — SIGNIFICANT CHANGE UP (ref 7–14)
APPEARANCE UR: CLEAR — SIGNIFICANT CHANGE UP
APTT BLD: 74.4 SEC — HIGH (ref 27–36.3)
AST SERPL-CCNC: 24 U/L — SIGNIFICANT CHANGE UP (ref 4–40)
BILIRUB SERPL-MCNC: 0.4 MG/DL — SIGNIFICANT CHANGE UP (ref 0.2–1.2)
BILIRUB UR-MCNC: NEGATIVE — SIGNIFICANT CHANGE UP
BUN SERPL-MCNC: 48 MG/DL — HIGH (ref 7–23)
CALCIUM SERPL-MCNC: 8.4 MG/DL — SIGNIFICANT CHANGE UP (ref 8.4–10.5)
CHLORIDE SERPL-SCNC: 103 MMOL/L — SIGNIFICANT CHANGE UP (ref 98–107)
CK MB BLD-MCNC: 1.4 % — SIGNIFICANT CHANGE UP (ref 0–2.5)
CK MB BLD-MCNC: 1.7 % — SIGNIFICANT CHANGE UP (ref 0–2.5)
CK MB CFR SERPL CALC: 3.2 NG/ML — SIGNIFICANT CHANGE UP
CK MB CFR SERPL CALC: 5 NG/ML — SIGNIFICANT CHANGE UP
CK SERPL-CCNC: 230 U/L — HIGH (ref 30–200)
CK SERPL-CCNC: 300 U/L — HIGH (ref 30–200)
CO2 SERPL-SCNC: 22 MMOL/L — SIGNIFICANT CHANGE UP (ref 22–31)
COLOR SPEC: SIGNIFICANT CHANGE UP
CREAT ?TM UR-MCNC: 69 MG/DL — SIGNIFICANT CHANGE UP
CREAT SERPL-MCNC: 2.32 MG/DL — HIGH (ref 0.5–1.3)
DIFF PNL FLD: NEGATIVE — SIGNIFICANT CHANGE UP
GLUCOSE BLDC GLUCOMTR-MCNC: 115 MG/DL — HIGH (ref 70–99)
GLUCOSE BLDC GLUCOMTR-MCNC: 139 MG/DL — HIGH (ref 70–99)
GLUCOSE BLDC GLUCOMTR-MCNC: 195 MG/DL — HIGH (ref 70–99)
GLUCOSE BLDC GLUCOMTR-MCNC: 269 MG/DL — HIGH (ref 70–99)
GLUCOSE SERPL-MCNC: 151 MG/DL — HIGH (ref 70–99)
GLUCOSE UR QL: ABNORMAL
HCT VFR BLD CALC: 34.5 % — LOW (ref 39–50)
HGB BLD-MCNC: 11.7 G/DL — LOW (ref 13–17)
INR BLD: 1.11 RATIO — SIGNIFICANT CHANGE UP (ref 0.88–1.16)
KETONES UR-MCNC: NEGATIVE — SIGNIFICANT CHANGE UP
LEUKOCYTE ESTERASE UR-ACNC: NEGATIVE — SIGNIFICANT CHANGE UP
MAGNESIUM SERPL-MCNC: 2.1 MG/DL — SIGNIFICANT CHANGE UP (ref 1.6–2.6)
MCHC RBC-ENTMCNC: 30.7 PG — SIGNIFICANT CHANGE UP (ref 27–34)
MCHC RBC-ENTMCNC: 33.9 GM/DL — SIGNIFICANT CHANGE UP (ref 32–36)
MCV RBC AUTO: 90.6 FL — SIGNIFICANT CHANGE UP (ref 80–100)
NITRITE UR-MCNC: NEGATIVE — SIGNIFICANT CHANGE UP
NRBC # BLD: 0 /100 WBCS — SIGNIFICANT CHANGE UP
NRBC # FLD: 0 K/UL — SIGNIFICANT CHANGE UP
PH UR: 6 — SIGNIFICANT CHANGE UP (ref 5–8)
PHOSPHATE SERPL-MCNC: 4.1 MG/DL — SIGNIFICANT CHANGE UP (ref 2.5–4.5)
PLATELET # BLD AUTO: 145 K/UL — LOW (ref 150–400)
POTASSIUM SERPL-MCNC: 4.2 MMOL/L — SIGNIFICANT CHANGE UP (ref 3.5–5.3)
POTASSIUM SERPL-SCNC: 4.2 MMOL/L — SIGNIFICANT CHANGE UP (ref 3.5–5.3)
PROT SERPL-MCNC: 6 G/DL — SIGNIFICANT CHANGE UP (ref 6–8.3)
PROT UR-MCNC: ABNORMAL
PROTHROM AB SERPL-ACNC: 12.9 SEC — SIGNIFICANT CHANGE UP (ref 10.5–13.4)
RBC # BLD: 3.81 M/UL — LOW (ref 4.2–5.8)
RBC # FLD: 14.2 % — SIGNIFICANT CHANGE UP (ref 10.3–14.5)
SODIUM SERPL-SCNC: 137 MMOL/L — SIGNIFICANT CHANGE UP (ref 135–145)
SODIUM UR-SCNC: 52 MMOL/L — SIGNIFICANT CHANGE UP
SP GR SPEC: 1.02 — SIGNIFICANT CHANGE UP (ref 1–1.05)
TROPONIN T, HIGH SENSITIVITY RESULT: 312 NG/L — CRITICAL HIGH
TROPONIN T, HIGH SENSITIVITY RESULT: 325 NG/L — CRITICAL HIGH
UROBILINOGEN FLD QL: SIGNIFICANT CHANGE UP
UUN UR-MCNC: 687 MG/DL — SIGNIFICANT CHANGE UP
WBC # BLD: 9.92 K/UL — SIGNIFICANT CHANGE UP (ref 3.8–10.5)
WBC # FLD AUTO: 9.92 K/UL — SIGNIFICANT CHANGE UP (ref 3.8–10.5)

## 2022-02-28 PROCEDURE — 99233 SBSQ HOSP IP/OBS HIGH 50: CPT

## 2022-02-28 PROCEDURE — 71045 X-RAY EXAM CHEST 1 VIEW: CPT | Mod: 26

## 2022-02-28 PROCEDURE — 99223 1ST HOSP IP/OBS HIGH 75: CPT | Mod: GC

## 2022-02-28 RX ORDER — TRAMADOL HYDROCHLORIDE 50 MG/1
25 TABLET ORAL ONCE
Refills: 0 | Status: DISCONTINUED | OUTPATIENT
Start: 2022-02-28 | End: 2022-02-28

## 2022-02-28 RX ORDER — HEPARIN SODIUM 5000 [USP'U]/ML
5000 INJECTION INTRAVENOUS; SUBCUTANEOUS EVERY 8 HOURS
Refills: 0 | Status: DISCONTINUED | OUTPATIENT
Start: 2022-02-28 | End: 2022-03-05

## 2022-02-28 RX ADMIN — GABAPENTIN 200 MILLIGRAM(S): 400 CAPSULE ORAL at 05:33

## 2022-02-28 RX ADMIN — Medication 81 MILLIGRAM(S): at 11:52

## 2022-02-28 RX ADMIN — Medication 0: at 07:57

## 2022-02-28 RX ADMIN — Medication 0: at 17:20

## 2022-02-28 RX ADMIN — PANTOPRAZOLE SODIUM 40 MILLIGRAM(S): 20 TABLET, DELAYED RELEASE ORAL at 05:32

## 2022-02-28 RX ADMIN — CARVEDILOL PHOSPHATE 6.25 MILLIGRAM(S): 80 CAPSULE, EXTENDED RELEASE ORAL at 05:32

## 2022-02-28 RX ADMIN — CHLORHEXIDINE GLUCONATE 1 APPLICATION(S): 213 SOLUTION TOPICAL at 11:51

## 2022-02-28 RX ADMIN — GABAPENTIN 200 MILLIGRAM(S): 400 CAPSULE ORAL at 17:22

## 2022-02-28 RX ADMIN — ISOSORBIDE DINITRATE 10 MILLIGRAM(S): 5 TABLET ORAL at 17:23

## 2022-02-28 RX ADMIN — TRAMADOL HYDROCHLORIDE 25 MILLIGRAM(S): 50 TABLET ORAL at 20:08

## 2022-02-28 RX ADMIN — Medication 25 MILLIGRAM(S): at 05:32

## 2022-02-28 RX ADMIN — Medication 20 MILLIGRAM(S): at 05:32

## 2022-02-28 RX ADMIN — Medication 1: at 11:51

## 2022-02-28 RX ADMIN — ATORVASTATIN CALCIUM 80 MILLIGRAM(S): 80 TABLET, FILM COATED ORAL at 21:23

## 2022-02-28 RX ADMIN — Medication 25 MILLIGRAM(S): at 21:23

## 2022-02-28 RX ADMIN — Medication 1: at 21:24

## 2022-02-28 RX ADMIN — ISOSORBIDE DINITRATE 10 MILLIGRAM(S): 5 TABLET ORAL at 05:32

## 2022-02-28 RX ADMIN — ISOSORBIDE DINITRATE 10 MILLIGRAM(S): 5 TABLET ORAL at 11:50

## 2022-02-28 RX ADMIN — TICAGRELOR 90 MILLIGRAM(S): 90 TABLET ORAL at 17:22

## 2022-02-28 RX ADMIN — Medication 25 MILLIGRAM(S): at 11:51

## 2022-02-28 RX ADMIN — CARVEDILOL PHOSPHATE 6.25 MILLIGRAM(S): 80 CAPSULE, EXTENDED RELEASE ORAL at 17:22

## 2022-02-28 RX ADMIN — TICAGRELOR 90 MILLIGRAM(S): 90 TABLET ORAL at 05:32

## 2022-02-28 RX ADMIN — HEPARIN SODIUM 5000 UNIT(S): 5000 INJECTION INTRAVENOUS; SUBCUTANEOUS at 21:23

## 2022-02-28 RX ADMIN — TRAMADOL HYDROCHLORIDE 25 MILLIGRAM(S): 50 TABLET ORAL at 19:38

## 2022-02-28 NOTE — CONSULT NOTE ADULT - SUBJECTIVE AND OBJECTIVE BOX
[facility] DIVISION OF KIDNEY DISEASES AND HYPERTENSION -- [phone]  -- INITIAL CONSULT NOTE  --------------------------------------------------------------------------------  HPI:  Pt. is an 88 year-old man with past medical history of CAD s/p CABG (1999), PCI stents x 5 (2019), PVD with stent place to Right Posterior Tibial Artery? (2021), T2DM, HTN, HLD, CKD, presented with near syncope and chest pain.  Patient's son states he has been feeling generalized fatigue for past few weeks but this morning nearly collapsed when standing and developed chest pressure.  Called his cardiologist, Dr. Corona, who advised he go to ED for evaluation.  Pt continues to report chest discomfort and generalized fatigue. Was due for dental appt on day of admission and had not taken asa for few days. 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 Received asa 325mg PO x1 , Brilinta 180mg PO x1 and heparin drip for ACS. Trop 143. Received insulin/dex 50% and mike gluconate IV and lokema  for K+ 5.9. He was taken to cath lab for ongoing chest discomfort.  Patient went urgently to cath lab for sten to SVG to OM1 with IABP placed.    Nephrology consulted for MABLE. Per discussion with family and Pt. at bedside. Pt. had SCr of 1.7 in July 2021 which trended up to 2.0 in January of 2022. Pt. was admitted with Cr. of 2.0 which has trended up to 2.3 today. On day of admission Pt. received 35 cc of Vasopaque durinf stent placment and is maintained on IABP to be removed today. Pt. denies any changes in urinary or bowel habits. Pt. endorses remote (40+ year) history of renal stones and kidney issues then but none currently. Pt. does not follow with outpatient Nephrologist.          PAST HISTORY  --------------------------------------------------------------------------------  PAST MEDICAL & SURGICAL HISTORY:  Hyperlipemia    Hypertension    Coronary Artery Disease    Diabetes Mellitus Type II    Stented Coronary Artery  total 5 stents, last stent 5/2019    Neuropathy    Myocardial infarction    Stroke  mild left facial numbness   no other residuals verbalized    History of Cataract Extraction    Hx of CABG    H/O coronary angiogram    S/P coronary artery stent placement  1/6/09      FAMILY HISTORY:    PAST SOCIAL HISTORY:    ALLERGIES & MEDICATIONS  --------------------------------------------------------------------------------  Allergies    carbamazepine (Other)  Cipro (Unknown)  fluoroquinolone antibiotics (Other)  Tegretol (Unknown)    Intolerances      Standing Inpatient Medications  aspirin enteric coated 81 milliGRAM(s) Oral daily  atorvastatin 80 milliGRAM(s) Oral at bedtime  carvedilol 6.25 milliGRAM(s) Oral every 12 hours  chlorhexidine 4% Liquid 1 Application(s) Topical <User Schedule>  dextrose 40% Gel 15 Gram(s) Oral once  dextrose 5%. 1000 milliLiter(s) IV Continuous <Continuous>  dextrose 5%. 1000 milliLiter(s) IV Continuous <Continuous>  dextrose 50% Injectable 25 Gram(s) IV Push once  dextrose 50% Injectable 12.5 Gram(s) IV Push once  dextrose 50% Injectable 25 Gram(s) IV Push once  furosemide    Tablet 20 milliGRAM(s) Oral daily  gabapentin 200 milliGRAM(s) Oral two times a day  glucagon  Injectable 1 milliGRAM(s) IntraMuscular once  hydrALAZINE 25 milliGRAM(s) Oral three times a day  insulin lispro (ADMELOG) corrective regimen sliding scale   SubCutaneous three times a day before meals  insulin lispro (ADMELOG) corrective regimen sliding scale   SubCutaneous at bedtime  isosorbide   dinitrate Tablet (ISORDIL) 10 milliGRAM(s) Oral three times a day  pantoprazole    Tablet 40 milliGRAM(s) Oral before breakfast  ticagrelor 90 milliGRAM(s) Oral every 12 hours    PRN Inpatient Medications      REVIEW OF SYSTEMS  --------------------------------------------------------------------------------  Gen: No fevers/chills  Skin: No rashes  Head/Eyes/Ears: Normal hearing,   Respiratory: No dyspnea, cough  CV: No chest pain  GI: No abdominal pain, diarrhea  : No dysuria, hematuria  MSK: No  edema  Heme: No easy bruising or bleeding  Psych: No significant depression    All other systems were reviewed and are negative, except as noted.    VITALS/PHYSICAL EXAM  --------------------------------------------------------------------------------  T(C): 36.8 (02-28-22 @ 12:00), Max: 36.9 (02-28-22 @ 00:00)  HR: 76 (02-28-22 @ 13:00) (65 - 84)  BP: 167/48 (02-28-22 @ 05:00) (129/77 - 167/48)  RR: 21 (02-28-22 @ 13:00) (10 - 23)  SpO2: 94% (02-28-22 @ 13:00) (93% - 100%)  Wt(kg): --  Height (cm): 172.7 (02-26-22 @ 19:10)  Weight (kg): 77.2 (02-26-22 @ 19:10)  BMI (kg/m2): 25.9 (02-26-22 @ 19:10)  BSA (m2): 1.91 (02-26-22 @ 19:10)      02-27-22 @ 07:01 - 02-28-22 @ 07:00  --------------------------------------------------------  IN: 1404 mL / OUT: 1710 mL / NET: -306 mL    02-28-22 @ 07:01  -  02-28-22 @ 14:12  --------------------------------------------------------  IN: 0 mL / OUT: 1575 mL / NET: -1575 mL      Physical Exam:  	Gen: NAD  	HEENT: MMM  	Pulm: CTA B/L  	CV: S1S2  	Abd: Soft, +BS   	Ext: No LE edema B/L  	Neuro: Awake  	Skin: Warm and dry  	Vascular access: Peripheral    LABS/STUDIES  --------------------------------------------------------------------------------              11.7   9.92  >-----------<  145      [02-28-22 @ 00:26]              34.5     137  |  103  |  48  ----------------------------<  151      [02-28-22 @ 00:26]  4.2   |  22  |  2.32        Ca     8.4     [02-28-22 @ 00:26]      Mg     2.10     [02-28-22 @ 00:26]      Phos  4.1     [02-28-22 @ 00:26]    TPro  6.0  /  Alb  3.4  /  TBili  0.4  /  DBili  x   /  AST  24  /  ALT  12  /  AlkPhos  42  [02-28-22 @ 00:26]    PT/INR: PT 12.9 , INR 1.11       [02-28-22 @ 00:26]  PTT: 74.4       [02-28-22 @ 00:26]          [02-28-22 @ 00:26]    Creatinine Trend:  SCr 2.32 [02-28 @ 00:26]  SCr 2.15 [02-27 @ 02:24]  SCr 2.07 [02-26 @ 14:40]    Urinalysis - [02-26-22 @ 21:27]      Color Colorless / Appearance Clear / SG 1.010 / pH 6.0      Gluc 500 mg/dL / Ketone Negative  / Bili Negative / Urobili <2 mg/dL       Blood Moderate / Protein Negative / Leuk Est Negative / Nitrite Negative      RBC 16 / WBC 0 / Hyaline 1 / Gran  / Sq Epi  / Non Sq Epi 0 / Bacteria Negative      HbA1c 9.4      [05-24-19 @ 23:30]  TSH 1.71      [02-27-22 @ 02:26]  Lipid: chol 113, TG 46, HDL 51, LDL --      [02-27-22 @ 02:26]

## 2022-02-28 NOTE — CONSULT NOTE ADULT - ASSESSMENT
Pt. is an 88 year-old man with past medical history of CAD s/p CABG (1999), PCI stents x 5 (2019), PVD with stent place to Right Posterior Tibial Artery? (2021), T2DM, HTN, HLD, CKD, presented with near syncope and chest pain. Nephrology consulted for MABLE.

## 2022-02-28 NOTE — CONSULT NOTE ADULT - PROBLEM SELECTOR RECOMMENDATION 9
Pt with MABLE in the setting of NSTEMI now s/p stent placement and IABP. In January 2022 Pt. had SCr of 2.0 trending up from 1.7 in July 2021. Pt. admitted with Cr. of 2.0 now trending to 2.3 (2/28). UA on 26 showing moderate blood but bland otherwise, please repeat with urine electrolytes. Suspect that Scr. will plateau within the next 24 hours and then improve original insult of NSTEMI, contrast, and IABP was the cause of MABLE. Echo reviewed. 1.5L UOP in last 24 hours. No objections to volume optimization with PO Lasix. Monitor labs and urine output. Avoid NSAIDs, ACEI/ARBS, RCA and nephrotoxins. Dose medications as per eGFR.    If you have any questions, please feel free to contact me  Sai Bourgeois  Nephrology Fellow  [phone]; Prefer [employer] TEAMS  (After 5pm or on weekends please page the on-call fellow)

## 2022-02-28 NOTE — PROGRESS NOTE ADULT - ASSESSMENT
88M PMH CAD/MI (CABG + Stents X 5), PAD s/p Right LE Stent, HTN, HLD, DMT2, CKD, CVA (no residual), presented with near syncope and Chest pain, STEMI with ROCIO in III + AVR s/p urgent PCI and  GEMMA to 95%  mSVG to OM1 and POBA to pSVG-OM1.  IABP placed for coronary artery perfusion.        NEURO:  #Near syncopal episode may have been vasovagal    - Currently A&Ox3  - Continue monitor  - Fall risk protocol     CV:  #NSTEMI (+trops, +EKG changes) ,+ trop | chest discomfort  - loaded with 325 mg ASA, 180mg Ticagrelor/nitroglycerin 0.4mg, heparin bolus w. nomogram  - s/p LHC with GEMMA to mSVG to OM1 and POBA to pSVG-OM1  - Cont w.  ASA 81, Ticagrelor 90mg BID, lipitor 80mg ,coreg 6.25mg  - POCUS in cath showed EF 35%m, LVEDP 27,  - develop sob during procedure found to have crackles /wheezing  in cath lab, lasix 40mg IVP and solumedrol and Duoneb with good response  - IABP 1:1 ratio, Aug Blood Pressure 130-140, off aug Blood Pressure 112/47  - Continue heparin gtt full AC for IABP prophylaxis - hold at 0500 am on 2/28  - Hold  ACE for now, start on hydralazine /isordil for afterload reduction  - TTE to evaluate LV Function today  - DASH/CC Diet  -trend CK/Trp    #Hx CAD s/p 5 stents w. last stent placed in 5/2019  - c/w DAPT ASA and Brilinta, coreg 6.25mg Lipitor  80mg -   - cont. ACE-I (wait until AM labs return)   - RICHARD  in 2019 indicated EF 45-50% w. hypokinesis of inferolateral wall  - Echo today    diastolic CHF  - POCUS in cath lab showed EF 35%   -Crackles/wheezing during angioplasty s/p lasix 40mg IV / solumedrol   - c/w home dose  lasix 20mg PO daily  - echo today    PULM:  -No active issues    RENAL:  #CKD    - SCreat on admission 2, today 2.15  -Strict I+O- UO 1525cc/12 hrs, net neg 881cc  - Check Electrolytes and replenish to maintain K >4 and Mag >2  - Avoid nephrotoxic drugs  -nephrology consult in am    GI:  #Diet: DASH/TLC w. consistent  carbs     ENDO:  #DM2  -  HbA1c 8.8  -c/w  Insulin Sliding Scale    #HLD  Lipid panel WNL  c/w lipitor 80mg PO daily for ACS      HEMATOLOGIC:  #mild normochromic normocytic anemia  - Monitor for now     #DVT prophylaxis   not needed as patient on DAPT & heparin gtt    ID:  -No active issues    SKIN:  #Lines:   Left femoral IABP     88M PMH CAD/MI (CABG + Stents X 5), PAD s/p Right LE Stent, HTN, HLD, DMT2, CKD, CVA (no residual), presented with near syncope and Chest pain, STEMI with ROCIO in III + AVR s/p urgent PCI and  GEMMA to 95%  mSVG to OM1 and POBA to pSVG-OM1.  IABP placed for coronary artery perfusion.        NEURO:  #Near syncopal episode may have been vasovagal    - Currently A&Ox3  - Continue monitor  - Fall risk protocol     CV:  #NSTEMI (+trops, +EKG changes) ,+ trop | chest discomfort  - loaded with 325 mg ASA, 180mg Ticagrelor/nitroglycerin 0.4mg  - s/p LHC with GEMMA to mSVG to OM1 and POBA to pSVG-OM1  - Cont w.  ASA 81, Ticagrelor 90mg BID, lipitor 80mg ,coreg 6.25mg  - POCUS in cath showed EF 35%m, LVEDP 27,  - develop sob during procedure found to have crackles /wheezing  in cath lab, lasix 40mg IVP and solumedrol and Duoneb with good response  - heparin gtt d/c at 5 AM. plan to remove IABP today  - on hydralazine /isordil for afterload reduction  - DASH/CC Diet  - trend CK/Trp    #Hx CAD s/p 5 stents w. last stent placed in 5/2019  - c/w DAPT ASA and Brilinta, coreg 6.25mg Lipitor  80mg    - cont. ACE-I (wait until AM labs return)   - RICHARD  in 2019 indicated EF 45-50% w. hypokinesis of inferolateral wall  - Echo today    diastolic CHF  - POCUS in cath lab showed EF 35%   -Crackles/wheezing during angioplasty s/p lasix 40mg IV / solumedrol   - c/w home dose  lasix 20mg PO daily    PULM:  -No active issues    RENAL:  #MABLE superimposed on CKD    -possible BERENICE, s/p left heart cath on 2/26  - SCreat on admission 2, today 2.32  -Strict I+O- UO 1525cc/12 hrs, net neg 881cc  - Check Electrolytes and replenish to maintain K >4 and Mag >2  - Avoid nephrotoxic drugs  -nephrology consulted    GI:  #Diet: DASH/TLC w. consistent  carbs     ENDO:  #DM2  -  HbA1c 8.8  -c/w  Insulin Sliding Scale    #HLD  Lipid panel WNL  c/w lipitor 80mg PO daily for ACS      HEMATOLOGIC:  #mild normochromic normocytic anemia  - Monitor for now     #DVT prophylaxis  -heparin gtt discontinued. Can start heparin sub Q tongith after IABP removed    ID:  -No active issues    SKIN:  #Lines:   Left femoral IABP

## 2022-02-28 NOTE — CHART NOTE - NSCHARTNOTEFT_GEN_A_CORE
RFA IABP removed at bedside. Applied 30 minutes of manual pressure. Hemostasis achieved. Area is soft, no hematoma. Patient tolerated procedure well.     Adolfo Perales MD  Cardiology Fellow - PGY 4  Text or Call: [phone]  For all New Consults and Questions:  [email or web address]   Login: [employer] RFA IABP removed at bedside. Applied 30 minutes of manual pressure. Hemostasis achieved. Area is soft, no hematoma. Patient tolerated procedure well. Bedrest for 6hrs. Follow post-IABP removal protocol.     Adolfo Perales MD  Cardiology Fellow - PGY 4  Text or Call: [phone]  For all New Consults and Questions:  [email or web address]   Login: [employer] LFA IABP removed at bedside. Applied 30 minutes of manual pressure. Hemostasis achieved. Area is soft, no hematoma. Patient tolerated procedure well. Bedrest for 6hrs. Follow post-IABP removal protocol.     Adolfo Perales MD  Cardiology Fellow - PGY 4  Text or Call: [phone]  For all New Consults and Questions:  [email or web address]   Login: [email or web address]

## 2022-02-28 NOTE — PROGRESS NOTE ADULT - SUBJECTIVE AND OBJECTIVE BOX
Patient is a 88y old  Male who presents with a chief complaint of NSTEMI (2022 08:01)    HPI:  This is an 88 year-old man with past medical history of CAD s/p CABG (), PCI stents x 5 (), PVD with stent place to Right Posterior Tibial Artery? (), T2DM, HTN, HLD, CKD, presented with near syncope and chest pain.  Patient's son states he has been feeling generalized fatigue for past few weeks but this morning nearly collapsed when standing and developed chest pressure.  Called his cardiologist, Dr. Corona, who advised he go to ED for evaluation.  Pt continues to report chest discomfort and generalized fatigue.  Was due for dental appt this morning and has not taken asa for few days. 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 Received asa 325mg PO x1 , Brilinta 180mg PO x1 and heparin drip for ACS. Trop 143. Received insulin/dex 50% and mike gluconate IV and lokema  for K+ 5.9. He was taken to cath lab for ongoing chest discomfort.  Patient went urgently to cath lab for sten to SVG to OM1 with IABP placed. (2022 15:39)       INTERVAL HPI/OVERNIGHT EVENTS:   No overnight events   Afebrile, hemodynamically stable     Subjective:    ICU Vital Signs Last 24 Hrs  T(C): 36.8 (2022 05:00), Max: 36.9 (2022 12:00)  T(F): 98.2 (2022 05:00), Max: 98.5 (2022 00:00)  HR: 67 (2022 07:00) (53 - 84)  BP: 167/48 (2022 05:00) (129/77 - 167/48)  BP(mean): --  ABP: 88/25 (2022 12:00) (88/25 - 91/20)  ABP(mean): 61 (2022 12:00) (60 - 61)  RR: 16 (2022 07:00) (12 - 23)  SpO2: 98% (2022 07:00) (93% - 100%)    I&O's Summary    2022 07:01  -  2022 07:00  --------------------------------------------------------  IN: 1404 mL / OUT: 1710 mL / NET: -306 mL          Daily     Daily Weight in k.6 (2022 05:00)    Adult Advanced Hemodynamics Last 24 Hrs  CVP(mm Hg): --  CVP(cm H2O): --  CO: --  CI: --  PA: --  PA(mean): --  PCWP: --  SVR: --  SVRI: --  PVR: --  PVRI: --    EKG/Telemetry Events:    MEDICATIONS  (STANDING):  aspirin enteric coated 81 milliGRAM(s) Oral daily  atorvastatin 80 milliGRAM(s) Oral at bedtime  carvedilol 6.25 milliGRAM(s) Oral every 12 hours  chlorhexidine 4% Liquid 1 Application(s) Topical <User Schedule>  dextrose 40% Gel 15 Gram(s) Oral once  dextrose 5%. 1000 milliLiter(s) (50 mL/Hr) IV Continuous <Continuous>  dextrose 5%. 1000 milliLiter(s) (100 mL/Hr) IV Continuous <Continuous>  dextrose 50% Injectable 25 Gram(s) IV Push once  dextrose 50% Injectable 12.5 Gram(s) IV Push once  dextrose 50% Injectable 25 Gram(s) IV Push once  furosemide    Tablet 20 milliGRAM(s) Oral daily  gabapentin 200 milliGRAM(s) Oral two times a day  glucagon  Injectable 1 milliGRAM(s) IntraMuscular once  hydrALAZINE 25 milliGRAM(s) Oral three times a day  insulin lispro (ADMELOG) corrective regimen sliding scale   SubCutaneous three times a day before meals  insulin lispro (ADMELOG) corrective regimen sliding scale   SubCutaneous at bedtime  isosorbide   dinitrate Tablet (ISORDIL) 10 milliGRAM(s) Oral three times a day  pantoprazole    Tablet 40 milliGRAM(s) Oral before breakfast  ticagrelor 90 milliGRAM(s) Oral every 12 hours    MEDICATIONS  (PRN):      PHYSICAL EXAM:  GENERAL:   HEAD:  Atraumatic, Normocephalic  EYES: EOMI, PERRLA, conjunctiva and sclera clear  NECK: Supple, No JVD, Normal thyroid, no enlarged nodes  NERVOUS SYSTEM:  Alert & Awake.   CHEST/LUNG: B/L good air entry; No rales, rhonchi, or wheezing  HEART: S1S2 normal, no S3, Regular rate and rhythm; No murmurs  ABDOMEN: Soft, Nontender, Nondistended; Bowel sounds present  EXTREMITIES:  2+ Peripheral Pulses, No clubbing, cyanosis, or edema  LYMPH: No lymphadenopathy noted  SKIN: No rashes or lesions    LABS:                        11.7   9.92  )-----------( 145      ( 2022 00:26 )             34.5         137  |  103  |  48<H>  ----------------------------<  151<H>  4.2   |  22  |  2.32<H>    Ca    8.4      2022 00:26  Phos  4.1       Mg     2.10         TPro  6.0  /  Alb  3.4  /  TBili  0.4  /  DBili  x   /  AST  24  /  ALT  12  /  AlkPhos  42      LIVER FUNCTIONS - ( 2022 00:26 )  Alb: 3.4 g/dL / Pro: 6.0 g/dL / ALK PHOS: 42 U/L / ALT: 12 U/L / AST: 24 U/L / GGT: x           PT/INR - ( 2022 00:26 )   PT: 12.9 sec;   INR: 1.11 ratio         PTT - ( 2022 00:26 )  PTT:74.4 sec  CAPILLARY BLOOD GLUCOSE      POCT Blood Glucose.: 193 mg/dL (2022 21:17)  POCT Blood Glucose.: 127 mg/dL (2022 17:10)  POCT Blood Glucose.: 163 mg/dL (2022 11:43)  POCT Blood Glucose.: 191 mg/dL (2022 08:12)      CKMB Units: 5.0 ng/mL ( @ 00:26)  Creatine Kinase, Serum: 300 U/L ( @ 00:26)  Creatine Kinase, Serum: 279 U/L ( @ 10:45)  CKMB Units: 7.4 ng/mL ( @ 10:45)    CARDIAC MARKERS ( 2022 00:26 )  x     / x     / 300 U/L / x     / 5.0 ng/mL  CARDIAC MARKERS ( 2022 10:45 )  x     / x     / 279 U/L / x     / 7.4 ng/mL  CARDIAC MARKERS ( 2022 02:24 )  x     / x     / 317 U/L / x     / 10.9 ng/mL      Urinalysis Basic - ( 2022 21:27 )    Color: Colorless / Appearance: Clear / S.010 / pH: x  Gluc: x / Ketone: Negative  / Bili: Negative / Urobili: <2 mg/dL   Blood: x / Protein: Negative / Nitrite: Negative   Leuk Esterase: Negative / RBC: 16 /HPF / WBC 0 /HPF   Sq Epi: x / Non Sq Epi: 0 /HPF / Bacteria: Negative          RADIOLOGY & ADDITIONAL TESTS:  CXR:        Care Discussed with Consultants/Other Providers [ x] YES  [ ] NO                 Patient is a 88y old  Male who presents with a chief complaint of NSTEMI (2022 08:01)    HPI:  This is an 88 year-old man with past medical history of CAD s/p CABG (), PCI stents x 5 (), PVD with stent place to Right Posterior Tibial Artery? (), T2DM, HTN, HLD, CKD, presented with near syncope and chest pain.  Patient's son states he has been feeling generalized fatigue for past few weeks but this morning nearly collapsed when standing and developed chest pressure.  Called his cardiologist, Dr. Corona, who advised he go to ED for evaluation.  Pt continues to report chest discomfort and generalized fatigue.  Was due for dental appt this morning and has not taken asa for few days. 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 Received asa 325mg PO x1 , Brilinta 180mg PO x1 and heparin drip for ACS. Trop 143. Received insulin/dex 50% and mike gluconate IV and lokema  for K+ 5.9. He was taken to cath lab for ongoing chest discomfort.  Patient went urgently to cath lab for sten to SVG to OM1 with IABP placed. (2022 15:39)       INTERVAL HPI/OVERNIGHT EVENTS:   Overnight: Off heparin gtt since 5 am. Episode of delirium/confusion overnight. Son was called and was able to reorient patient.  Afebrile, hemodynamically stable     Subjective:    ICU Vital Signs Last 24 Hrs  T(C): 36.8 (2022 05:00), Max: 36.9 (2022 12:00)  T(F): 98.2 (2022 05:00), Max: 98.5 (2022 00:00)  HR: 67 (2022 07:00) (53 - 84)  BP: 167/48 (2022 05:00) (129/77 - 167/48)  BP(mean): --  ABP: 88/25 (2022 12:00) (88/25 - 91/20)  ABP(mean): 61 (2022 12:00) (60 - 61)  RR: 16 (2022 07:00) (12 - 23)  SpO2: 98% (2022 07:00) (93% - 100%)    I&O's Summary    2022 07:01  -  2022 07:00  --------------------------------------------------------  IN: 1404 mL / OUT: 1710 mL / NET: -306 mL          Daily     Daily Weight in k.6 (2022 05:00)    Adult Advanced Hemodynamics Last 24 Hrs  CVP(mm Hg): --  CVP(cm H2O): --  CO: --  CI: --  PA: --  PA(mean): --  PCWP: --  SVR: --  SVRI: --  PVR: --  PVRI: --    EKG/Telemetry Events:    MEDICATIONS  (STANDING):  aspirin enteric coated 81 milliGRAM(s) Oral daily  atorvastatin 80 milliGRAM(s) Oral at bedtime  carvedilol 6.25 milliGRAM(s) Oral every 12 hours  chlorhexidine 4% Liquid 1 Application(s) Topical <User Schedule>  dextrose 40% Gel 15 Gram(s) Oral once  dextrose 5%. 1000 milliLiter(s) (50 mL/Hr) IV Continuous <Continuous>  dextrose 5%. 1000 milliLiter(s) (100 mL/Hr) IV Continuous <Continuous>  dextrose 50% Injectable 25 Gram(s) IV Push once  dextrose 50% Injectable 12.5 Gram(s) IV Push once  dextrose 50% Injectable 25 Gram(s) IV Push once  furosemide    Tablet 20 milliGRAM(s) Oral daily  gabapentin 200 milliGRAM(s) Oral two times a day  glucagon  Injectable 1 milliGRAM(s) IntraMuscular once  hydrALAZINE 25 milliGRAM(s) Oral three times a day  insulin lispro (ADMELOG) corrective regimen sliding scale   SubCutaneous three times a day before meals  insulin lispro (ADMELOG) corrective regimen sliding scale   SubCutaneous at bedtime  isosorbide   dinitrate Tablet (ISORDIL) 10 milliGRAM(s) Oral three times a day  pantoprazole    Tablet 40 milliGRAM(s) Oral before breakfast  ticagrelor 90 milliGRAM(s) Oral every 12 hours    MEDICATIONS  (PRN):      PHYSICAL EXAM:  GENERAL:   HEAD:  Atraumatic, Normocephalic  EYES: EOMI, PERRLA, conjunctiva and sclera clear  NECK: Supple, No JVD, Normal thyroid, no enlarged nodes  NERVOUS SYSTEM:  Alert & Awake.   CHEST/LUNG: B/L good air entry; No rales, rhonchi, or wheezing  HEART: S1S2 normal, no S3, Regular rate and rhythm; No murmurs  ABDOMEN: Soft, Nontender, Nondistended; Bowel sounds present  EXTREMITIES:  2+ Peripheral Pulses, No clubbing, cyanosis, or edema  LYMPH: No lymphadenopathy noted  SKIN: No rashes or lesions    LABS:                        11.7   9.92  )-----------( 145      ( 2022 00:26 )             34.5         137  |  103  |  48<H>  ----------------------------<  151<H>  4.2   |  22  |  2.32<H>    Ca    8.4      2022 00:26  Phos  4.1       Mg     2.10         TPro  6.0  /  Alb  3.4  /  TBili  0.4  /  DBili  x   /  AST  24  /  ALT  12  /  AlkPhos  42      LIVER FUNCTIONS - ( 2022 00:26 )  Alb: 3.4 g/dL / Pro: 6.0 g/dL / ALK PHOS: 42 U/L / ALT: 12 U/L / AST: 24 U/L / GGT: x           PT/INR - ( 2022 00:26 )   PT: 12.9 sec;   INR: 1.11 ratio         PTT - ( 2022 00:26 )  PTT:74.4 sec  CAPILLARY BLOOD GLUCOSE      POCT Blood Glucose.: 193 mg/dL (2022 21:17)  POCT Blood Glucose.: 127 mg/dL (2022 17:10)  POCT Blood Glucose.: 163 mg/dL (2022 11:43)  POCT Blood Glucose.: 191 mg/dL (2022 08:12)      CKMB Units: 5.0 ng/mL ( @ 00:26)  Creatine Kinase, Serum: 300 U/L ( @ 00:26)  Creatine Kinase, Serum: 279 U/L ( @ 10:45)  CKMB Units: 7.4 ng/mL ( @ 10:45)    CARDIAC MARKERS ( 2022 00:26 )  x     / x     / 300 U/L / x     / 5.0 ng/mL  CARDIAC MARKERS ( 2022 10:45 )  x     / x     / 279 U/L / x     / 7.4 ng/mL  CARDIAC MARKERS ( 2022 02:24 )  x     / x     / 317 U/L / x     / 10.9 ng/mL      Urinalysis Basic - ( 2022 21:27 )    Color: Colorless / Appearance: Clear / S.010 / pH: x  Gluc: x / Ketone: Negative  / Bili: Negative / Urobili: <2 mg/dL   Blood: x / Protein: Negative / Nitrite: Negative   Leuk Esterase: Negative / RBC: 16 /HPF / WBC 0 /HPF   Sq Epi: x / Non Sq Epi: 0 /HPF / Bacteria: Negative          RADIOLOGY & ADDITIONAL TESTS:  CXR:        Care Discussed with Consultants/Other Providers [ x] YES  [ ] NO                 Patient is a 88y old  Male who presents with a chief complaint of NSTEMI (2022 08:01)    HPI:  This is an 88 year-old man with past medical history of CAD s/p CABG (), PCI stents x 5 (), PVD with stent place to Right Posterior Tibial Artery? (), T2DM, HTN, HLD, CKD, presented with near syncope and chest pain.  Patient's son states he has been feeling generalized fatigue for past few weeks but this morning nearly collapsed when standing and developed chest pressure.  Called his cardiologist, Dr. Corona, who advised he go to ED for evaluation.  Pt continues to report chest discomfort and generalized fatigue.  Was due for dental appt this morning and has not taken asa for few days. 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 Received asa 325mg PO x1 , Brilinta 180mg PO x1 and heparin drip for ACS. Trop 143. Received insulin/dex 50% and mike gluconate IV and lokema  for K+ 5.9. He was taken to cath lab for ongoing chest discomfort.  Patient went urgently to cath lab for sten to SVG to OM1 with IABP placed. (2022 15:39)       INTERVAL HPI/OVERNIGHT EVENTS:   Overnight: Off heparin gtt since 5 am. Episode of delirium/confusion overnight. Son was called and was able to reorient patient  Afebrile, hemodynamically stable     tele: sinus juli to 50s.     Subjective:  Pt complaining of generalized weakness and body aches. Denies chest pain, SOB, chills.    ICU Vital Signs Last 24 Hrs  T(C): 36.8 (2022 05:00), Max: 36.9 (2022 12:00)  T(F): 98.2 (2022 05:00), Max: 98.5 (2022 00:00)  HR: 67 (2022 07:00) (53 - 84)  BP: 167/48 (2022 05:00) (129/77 - 167/48)  BP(mean): --  ABP: 88/25 (2022 12:00) (88/25 - 91/20)  ABP(mean): 61 (2022 12:00) (60 - 61)  RR: 16 (2022 07:00) (12 - 23)  SpO2: 98% (2022 07:00) (93% - 100%)    I&O's Summary    2022 07:01  -  2022 07:00  --------------------------------------------------------  IN: 1404 mL / OUT: 1710 mL / NET: -306 mL      Daily     Daily Weight in k.6 (2022 05:00)    Adult Advanced Hemodynamics Last 24 Hrs  CVP(mm Hg): --  CVP(cm H2O): --  CO: --  CI: --  PA: --  PA(mean): --  PCWP: --  SVR: --  SVRI: --  PVR: --  PVRI: --    EKG/Telemetry Events:    MEDICATIONS  (STANDING):  aspirin enteric coated 81 milliGRAM(s) Oral daily  atorvastatin 80 milliGRAM(s) Oral at bedtime  carvedilol 6.25 milliGRAM(s) Oral every 12 hours  chlorhexidine 4% Liquid 1 Application(s) Topical <User Schedule>  dextrose 40% Gel 15 Gram(s) Oral once  dextrose 5%. 1000 milliLiter(s) (50 mL/Hr) IV Continuous <Continuous>  dextrose 5%. 1000 milliLiter(s) (100 mL/Hr) IV Continuous <Continuous>  dextrose 50% Injectable 25 Gram(s) IV Push once  dextrose 50% Injectable 12.5 Gram(s) IV Push once  dextrose 50% Injectable 25 Gram(s) IV Push once  furosemide    Tablet 20 milliGRAM(s) Oral daily  gabapentin 200 milliGRAM(s) Oral two times a day  glucagon  Injectable 1 milliGRAM(s) IntraMuscular once  hydrALAZINE 25 milliGRAM(s) Oral three times a day  insulin lispro (ADMELOG) corrective regimen sliding scale   SubCutaneous three times a day before meals  insulin lispro (ADMELOG) corrective regimen sliding scale   SubCutaneous at bedtime  isosorbide   dinitrate Tablet (ISORDIL) 10 milliGRAM(s) Oral three times a day  pantoprazole    Tablet 40 milliGRAM(s) Oral before breakfast  ticagrelor 90 milliGRAM(s) Oral every 12 hours    MEDICATIONS  (PRN):      PHYSICAL EXAM:  GENERAL: NAD  EYES: EOMI, PERRL  NECK: Supple, No JVD  NERVOUS SYSTEM:  Alert & Awake.   CHEST/LUNG: B/L good air entry; No rales, rhonchi, or wheezing, on room air  HEART: S1S2 normal, no S3, Regular rate and rhythm; No murmurs  ABDOMEN: Soft, Nontender, Nondistended; Bowel sounds present  EXTREMITIES: L femoral dressing with dried blood, LLE pulses 2+, No clubbing, cyanosis, or edema  LYMPH: No lymphadenopathy noted  SKIN: No rashes or lesions    LABS:                        11.7   9.92  )-----------( 145      ( 2022 00:26 )             34.5         137  |  103  |  48<H>  ----------------------------<  151<H>  4.2   |  22  |  2.32<H>    Ca    8.4      2022 00:26  Phos  4.1       Mg     2.10         TPro  6.0  /  Alb  3.4  /  TBili  0.4  /  DBili  x   /  AST  24  /  ALT  12  /  AlkPhos  42  28    LIVER FUNCTIONS - ( 2022 00:26 )  Alb: 3.4 g/dL / Pro: 6.0 g/dL / ALK PHOS: 42 U/L / ALT: 12 U/L / AST: 24 U/L / GGT: x           PT/INR - ( 2022 00:26 )   PT: 12.9 sec;   INR: 1.11 ratio         PTT - ( 2022 00:26 )  PTT:74.4 sec  CAPILLARY BLOOD GLUCOSE      POCT Blood Glucose.: 193 mg/dL (2022 21:17)  POCT Blood Glucose.: 127 mg/dL (2022 17:10)  POCT Blood Glucose.: 163 mg/dL (2022 11:43)  POCT Blood Glucose.: 191 mg/dL (2022 08:12)      CKMB Units: 5.0 ng/mL ( @ 00:26)  Creatine Kinase, Serum: 300 U/L ( @ 00:26)  Creatine Kinase, Serum: 279 U/L ( @ 10:45)  CKMB Units: 7.4 ng/mL ( @ 10:45)    CARDIAC MARKERS ( 2022 00:26 )  x     / x     / 300 U/L / x     / 5.0 ng/mL  CARDIAC MARKERS ( 2022 10:45 )  x     / x     / 279 U/L / x     / 7.4 ng/mL  CARDIAC MARKERS ( 2022 02:24 )  x     / x     / 317 U/L / x     / 10.9 ng/mL      Urinalysis Basic - ( 2022 21:27 )    Color: Colorless / Appearance: Clear / S.010 / pH: x  Gluc: x / Ketone: Negative  / Bili: Negative / Urobili: <2 mg/dL   Blood: x / Protein: Negative / Nitrite: Negative   Leuk Esterase: Negative / RBC: 16 /HPF / WBC 0 /HPF   Sq Epi: x / Non Sq Epi: 0 /HPF / Bacteria: Negative          RADIOLOGY & ADDITIONAL TESTS:    EXAM:  XR CHEST PORTABLE ROUTINE 1V                          PROCEDURE DATE:  2022          INTERPRETATION:  INDICATION: Chest pain.    COMPARISON: 22.    FINDINGS:  S/P sternotomy. IABP marker is 4.3cm below superior aortic knob.  Heart/Vascular: Heart is stable in appearance.  Pulmonary: Interval improvement in pulmonary vascular congestive changes.   No focal infiltrates. No pleural effusion or pneumothorax.  Bones: No acute bony finding.    Impression:  Interval improvement in pulmonary vascular congestive changes.    TTE:     PROCEDURE: Transthoracic echocardiogram with 2-D, M-Mode  and complete spectral and color flow Doppler.  Intravenous ultrasound enhancing agent was administered for  improved left ventricular endocardial border definition.  Following the intravenous injection of ultrasound enhancing  agent, harmonic imaging was performed.  INDICATION: Cardiac murmur, unspecified (R01.1)  ------------------------------------------------------------------------  OBSERVATIONS:  Mitral Valve: Mitral annular calcification, otherwise  normal mitral valve. Mild-moderate mitral regurgitation.  Aortic Root: Normal aortic root.  Aortic Valve: Aortic valve not well visualized; appears  calcified. Peak transaortic valve gradient equals 22 mm Hg,  mean transaortic valve gradient equals 11 mm Hg, consistent  with mild aortic stenosis.  Left Atrium: Normal left atrium.  Left Ventricle: Endocardial visualization enhanced with  intravenous injection of echo contrast (Definity). Mild  segmental left ventricular systolic dysfunction with  hypokinesis of the inferolateral wall. Visual estimate of  EFabout 45%. Normal left ventricular internaldimensions  and wall thicknesses. (DT:187 ms).  Right Heart: Normal right atrium. The right ventricle is  not well visualized. Normal tricuspid valve. Normal  pulmonic valve.  Pericardium/PleuraNormal pericardium with no pericardial  effusion.  ------------------------------------------------------------------------  CONCLUSIONS:  1. Mitral annular calcification, otherwise normal mitral  valve. Mild-moderate mitral regurgitation.  2. Aortic valve not well visualized; appears calcified.  Peak transaortic valve gradient equals 22 mm Hg, mean  transaortic valve gradient equals 11 mm Hg, consistent with  mild aortic stenosis.  3. Endocardial visualization enhanced with intravenous  injection of echo contrast (Definity). Mild segmental left  ventricular systolic dysfunction with hypokinesis of the  inferolateral wall. Visual estimate of EFabout 45%.  *** Compared with echocardiogram of 2019, no  significant changes noted.      Care Discussed with Consultants/Other Providers [ x] YES  [ ] NO

## 2022-02-28 NOTE — PROGRESS NOTE ADULT - ATTENDING COMMENTS
88 year old man with history of CAD sp CABG 20-25 years ago, multiple PCI thereafter most recently 2019 PCI SVG->OM; PPM, RLE PAD with stents in place. 2/26/22 had syncope and chest pain. 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 IABP placed.    TTE: 2/27/22  1. Mitral annular calcification, otherwise normal mitral  valve. Mild-moderate mitral regurgitation.  2. Aortic valve not well visualized; appears calcified.  Peak transaortic valve gradient equals 22 mm Hg, mean  transaortic valve gradient equals 11 mm Hg, consistent with  mild aortic stenosis.  3. Endocardial visualization enhanced with intravenous  injection of echo contrast (Definity). Mild segmental left  ventricular systolic dysfunction with hypokinesis of the  inferolateral wall. Visual estimate of EFabout 45%.  *** Compared with echocardiogram of 5/27/2019, no  significant changes noted.  ------------------------------------------------------------------------    Meds:  ASA 81 mg daily  Brilinta 90 mg BID  Coreg 6.25 mg BID  Hydralazine 25 mg TID  Isordil 10 mg TID  Atorvastatin 80 mg daily  Lasix 20 mg daily    #Neuro- No active issues  #CV- STEMI with PCI to mSVG->OM and POBA proxSVG->OM  IABP in place for 24-48 hours  Heparin on hold in anticipation of dc this afternoon  Continue DAPT with ASA/Brilinta/Statin  Continue Coreg  ACE/ARB on hold due to elevated Cr  #Renal- Follow Cr; will call outpatient provider to see baseline Cr  Continue to monitor  #Endo- Ha1c 8.8 now on sliding scale  #DVT ppx- will place on sq heparin once IABP pulled

## 2022-03-01 LAB
ALBUMIN SERPL ELPH-MCNC: 3.6 G/DL — SIGNIFICANT CHANGE UP (ref 3.3–5)
ALP SERPL-CCNC: 46 U/L — SIGNIFICANT CHANGE UP (ref 40–120)
ALT FLD-CCNC: 13 U/L — SIGNIFICANT CHANGE UP (ref 4–41)
ANION GAP SERPL CALC-SCNC: 13 MMOL/L — SIGNIFICANT CHANGE UP (ref 7–14)
AST SERPL-CCNC: 19 U/L — SIGNIFICANT CHANGE UP (ref 4–40)
BILIRUB SERPL-MCNC: 0.9 MG/DL — SIGNIFICANT CHANGE UP (ref 0.2–1.2)
BUN SERPL-MCNC: 39 MG/DL — HIGH (ref 7–23)
CALCIUM SERPL-MCNC: 8.7 MG/DL — SIGNIFICANT CHANGE UP (ref 8.4–10.5)
CHLORIDE SERPL-SCNC: 103 MMOL/L — SIGNIFICANT CHANGE UP (ref 98–107)
CK SERPL-CCNC: 171 U/L — SIGNIFICANT CHANGE UP (ref 30–200)
CO2 SERPL-SCNC: 21 MMOL/L — LOW (ref 22–31)
CREAT SERPL-MCNC: 1.97 MG/DL — HIGH (ref 0.5–1.3)
EGFR: 32 ML/MIN/1.73M2 — LOW
GLUCOSE BLDC GLUCOMTR-MCNC: 163 MG/DL — HIGH (ref 70–99)
GLUCOSE BLDC GLUCOMTR-MCNC: 174 MG/DL — HIGH (ref 70–99)
GLUCOSE BLDC GLUCOMTR-MCNC: 188 MG/DL — HIGH (ref 70–99)
GLUCOSE BLDC GLUCOMTR-MCNC: 213 MG/DL — HIGH (ref 70–99)
GLUCOSE BLDC GLUCOMTR-MCNC: 214 MG/DL — HIGH (ref 70–99)
GLUCOSE BLDC GLUCOMTR-MCNC: 285 MG/DL — HIGH (ref 70–99)
GLUCOSE SERPL-MCNC: 150 MG/DL — HIGH (ref 70–99)
HCT VFR BLD CALC: 37.6 % — LOW (ref 39–50)
HGB BLD-MCNC: 12.6 G/DL — LOW (ref 13–17)
MAGNESIUM SERPL-MCNC: 2.3 MG/DL — SIGNIFICANT CHANGE UP (ref 1.6–2.6)
MCHC RBC-ENTMCNC: 30.2 PG — SIGNIFICANT CHANGE UP (ref 27–34)
MCHC RBC-ENTMCNC: 33.5 GM/DL — SIGNIFICANT CHANGE UP (ref 32–36)
MCV RBC AUTO: 90.2 FL — SIGNIFICANT CHANGE UP (ref 80–100)
NRBC # BLD: 0 /100 WBCS — SIGNIFICANT CHANGE UP
NRBC # FLD: 0 K/UL — SIGNIFICANT CHANGE UP
PHOSPHATE SERPL-MCNC: 3.9 MG/DL — SIGNIFICANT CHANGE UP (ref 2.5–4.5)
PLATELET # BLD AUTO: 140 K/UL — LOW (ref 150–400)
POTASSIUM SERPL-MCNC: 4.1 MMOL/L — SIGNIFICANT CHANGE UP (ref 3.5–5.3)
POTASSIUM SERPL-SCNC: 4.1 MMOL/L — SIGNIFICANT CHANGE UP (ref 3.5–5.3)
PROT SERPL-MCNC: 6.4 G/DL — SIGNIFICANT CHANGE UP (ref 6–8.3)
RBC # BLD: 4.17 M/UL — LOW (ref 4.2–5.8)
RBC # FLD: 14.5 % — SIGNIFICANT CHANGE UP (ref 10.3–14.5)
SODIUM SERPL-SCNC: 137 MMOL/L — SIGNIFICANT CHANGE UP (ref 135–145)
WBC # BLD: 9.5 K/UL — SIGNIFICANT CHANGE UP (ref 3.8–10.5)
WBC # FLD AUTO: 9.5 K/UL — SIGNIFICANT CHANGE UP (ref 3.8–10.5)

## 2022-03-01 PROCEDURE — 99232 SBSQ HOSP IP/OBS MODERATE 35: CPT | Mod: GC

## 2022-03-01 PROCEDURE — 99233 SBSQ HOSP IP/OBS HIGH 50: CPT

## 2022-03-01 RX ORDER — SENNA PLUS 8.6 MG/1
2 TABLET ORAL AT BEDTIME
Refills: 0 | Status: DISCONTINUED | OUTPATIENT
Start: 2022-03-01 | End: 2022-03-05

## 2022-03-01 RX ORDER — INSULIN LISPRO 100/ML
3 VIAL (ML) SUBCUTANEOUS
Refills: 0 | Status: DISCONTINUED | OUTPATIENT
Start: 2022-03-01 | End: 2022-03-02

## 2022-03-01 RX ORDER — POLYETHYLENE GLYCOL 3350 17 G/17G
17 POWDER, FOR SOLUTION ORAL DAILY
Refills: 0 | Status: DISCONTINUED | OUTPATIENT
Start: 2022-03-01 | End: 2022-03-05

## 2022-03-01 RX ORDER — INSULIN GLARGINE 100 [IU]/ML
10 INJECTION, SOLUTION SUBCUTANEOUS AT BEDTIME
Refills: 0 | Status: DISCONTINUED | OUTPATIENT
Start: 2022-03-01 | End: 2022-03-02

## 2022-03-01 RX ORDER — TICAGRELOR 90 MG/1
1 TABLET ORAL
Qty: 60 | Refills: 0
Start: 2022-03-01 | End: 2022-03-30

## 2022-03-01 RX ORDER — HYDRALAZINE HCL 50 MG
10 TABLET ORAL THREE TIMES A DAY
Refills: 0 | Status: DISCONTINUED | OUTPATIENT
Start: 2022-03-01 | End: 2022-03-05

## 2022-03-01 RX ADMIN — Medication 10 MILLIGRAM(S): at 23:43

## 2022-03-01 RX ADMIN — HEPARIN SODIUM 5000 UNIT(S): 5000 INJECTION INTRAVENOUS; SUBCUTANEOUS at 06:13

## 2022-03-01 RX ADMIN — SENNA PLUS 2 TABLET(S): 8.6 TABLET ORAL at 23:43

## 2022-03-01 RX ADMIN — ATORVASTATIN CALCIUM 80 MILLIGRAM(S): 80 TABLET, FILM COATED ORAL at 23:40

## 2022-03-01 RX ADMIN — Medication 3: at 12:00

## 2022-03-01 RX ADMIN — GABAPENTIN 200 MILLIGRAM(S): 400 CAPSULE ORAL at 06:11

## 2022-03-01 RX ADMIN — HEPARIN SODIUM 5000 UNIT(S): 5000 INJECTION INTRAVENOUS; SUBCUTANEOUS at 13:48

## 2022-03-01 RX ADMIN — Medication 2: at 16:25

## 2022-03-01 RX ADMIN — CARVEDILOL PHOSPHATE 6.25 MILLIGRAM(S): 80 CAPSULE, EXTENDED RELEASE ORAL at 17:31

## 2022-03-01 RX ADMIN — POLYETHYLENE GLYCOL 3350 17 GRAM(S): 17 POWDER, FOR SOLUTION ORAL at 23:43

## 2022-03-01 RX ADMIN — Medication 81 MILLIGRAM(S): at 11:59

## 2022-03-01 RX ADMIN — TICAGRELOR 90 MILLIGRAM(S): 90 TABLET ORAL at 06:11

## 2022-03-01 RX ADMIN — Medication 3 UNIT(S): at 16:14

## 2022-03-01 RX ADMIN — Medication 20 MILLIGRAM(S): at 06:09

## 2022-03-01 RX ADMIN — PANTOPRAZOLE SODIUM 40 MILLIGRAM(S): 20 TABLET, DELAYED RELEASE ORAL at 06:09

## 2022-03-01 RX ADMIN — CARVEDILOL PHOSPHATE 6.25 MILLIGRAM(S): 80 CAPSULE, EXTENDED RELEASE ORAL at 06:11

## 2022-03-01 RX ADMIN — Medication 10 MILLIGRAM(S): at 15:47

## 2022-03-01 RX ADMIN — Medication 1: at 08:24

## 2022-03-01 RX ADMIN — ISOSORBIDE DINITRATE 10 MILLIGRAM(S): 5 TABLET ORAL at 15:47

## 2022-03-01 RX ADMIN — HEPARIN SODIUM 5000 UNIT(S): 5000 INJECTION INTRAVENOUS; SUBCUTANEOUS at 23:39

## 2022-03-01 RX ADMIN — ISOSORBIDE DINITRATE 10 MILLIGRAM(S): 5 TABLET ORAL at 06:09

## 2022-03-01 RX ADMIN — Medication 25 MILLIGRAM(S): at 06:09

## 2022-03-01 RX ADMIN — GABAPENTIN 200 MILLIGRAM(S): 400 CAPSULE ORAL at 17:32

## 2022-03-01 RX ADMIN — CHLORHEXIDINE GLUCONATE 1 APPLICATION(S): 213 SOLUTION TOPICAL at 12:34

## 2022-03-01 RX ADMIN — ISOSORBIDE DINITRATE 10 MILLIGRAM(S): 5 TABLET ORAL at 23:40

## 2022-03-01 RX ADMIN — TICAGRELOR 90 MILLIGRAM(S): 90 TABLET ORAL at 17:31

## 2022-03-01 NOTE — CHART NOTE - NSCHARTNOTEFT_GEN_A_CORE
CCU Transfer Note    Transfer from: CCU  Transfer to:  (  ) Medicine    ( x ) Telemetry    (  ) RCU    (  ) Palliative    (  ) Stroke Unit    (  ) _______________  Accepting physician:        CCU Course      MEDICATIONS:  STANDING MEDICATIONS  aspirin enteric coated 81 milliGRAM(s) Oral daily  atorvastatin 80 milliGRAM(s) Oral at bedtime  carvedilol 6.25 milliGRAM(s) Oral every 12 hours  chlorhexidine 4% Liquid 1 Application(s) Topical <User Schedule>  dextrose 40% Gel 15 Gram(s) Oral once  dextrose 5%. 1000 milliLiter(s) IV Continuous <Continuous>  dextrose 5%. 1000 milliLiter(s) IV Continuous <Continuous>  dextrose 50% Injectable 25 Gram(s) IV Push once  dextrose 50% Injectable 12.5 Gram(s) IV Push once  dextrose 50% Injectable 25 Gram(s) IV Push once  gabapentin 200 milliGRAM(s) Oral two times a day  glucagon  Injectable 1 milliGRAM(s) IntraMuscular once  heparin   Injectable 5000 Unit(s) SubCutaneous every 8 hours  hydrALAZINE 10 milliGRAM(s) Oral three times a day  insulin lispro (ADMELOG) corrective regimen sliding scale   SubCutaneous three times a day before meals  insulin lispro (ADMELOG) corrective regimen sliding scale   SubCutaneous at bedtime  isosorbide   dinitrate Tablet (ISORDIL) 10 milliGRAM(s) Oral three times a day  pantoprazole    Tablet 40 milliGRAM(s) Oral before breakfast  ticagrelor 90 milliGRAM(s) Oral every 12 hours    PRN MEDICATIONS      VITAL SIGNS: Last 24 Hours  T(C): 36.5 (01 Mar 2022 11:28), Max: 37.2 (01 Mar 2022 07:33)  T(F): 97.7 (01 Mar 2022 11:28), Max: 98.9 (01 Mar 2022 07:33)  HR: 84 (01 Mar 2022 13:00) (65 - 87)  BP: 97/56 (01 Mar 2022 13:00) (85/64 - 145/57)  BP(mean): 67 (01 Mar 2022 13:00) (62 - 88)  RR: 23 (01 Mar 2022 13:00) (8 - 23)  SpO2: 97% (01 Mar 2022 12:00) (92% - 100%)    LABS:                        12.6   9.50  )-----------( 140      ( 01 Mar 2022 07:59 )             37.6     03-    137  |  103  |  39<H>  ----------------------------<  150<H>  4.1   |  21<L>  |  1.97<H>    Ca    8.7      01 Mar 2022 07:59  Phos  3.9     03-  Mg     2.30     03-    TPro  6.4  /  Alb  3.6  /  TBili  0.9  /  DBili  x   /  AST  19  /  ALT  13  /  AlkPhos  46  03-    PT/INR - ( 2022 00:26 )   PT: 12.9 sec;   INR: 1.11 ratio         PTT - ( 2022 00:26 )  PTT:74.4 sec  Urinalysis Basic - ( 2022 18:18 )    Color: Light Yellow / Appearance: Clear / S.021 / pH: x  Gluc: x / Ketone: Negative  / Bili: Negative / Urobili: <2 mg/dL   Blood: x / Protein: Trace / Nitrite: Negative   Leuk Esterase: Negative / RBC: x / WBC x   Sq Epi: x / Non Sq Epi: x / Bacteria: x          CARDIAC MARKERS ( 01 Mar 2022 07:59 )  x     / x     / 171 U/L / x     / x      CARDIAC MARKERS ( 2022 20:14 )  x     / x     / 230 U/L / x     / 3.2 ng/mL  CARDIAC MARKERS ( 2022 00:26 )  x     / x     / 300 U/L / x     / 5.0 ng/mL      RADIOLOGY:    CCU COURSE:          ASSESSMENT & PLAN:         For Follow-Up: CCU Transfer Note    Transfer from: CCU  Transfer to:  (  ) Medicine    ( x ) Telemetry    (  ) RCU    (  ) Palliative    (  ) Stroke Unit    (  ) _______________  Accepting physician:    HPI:  This is an 88 year-old man with past medical history of CAD s/p CABG (), PCI stents x 5 (), PVD with stent place to Right Posterior Tibial Artery? (), T2DM, HTN, HLD, CKD, presented with near syncope and chest pain.  Patient's son states he has been feeling generalized fatigue for past few weeks but this morning nearly collapsed when standing and developed chest pressure.  Called his cardiologist, Dr. Corona, who advised he go to ED for evaluation.  Pt continues to report chest discomfort and generalized fatigue.  Was due for dental appt this morning and has not taken asa for few days. 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 Received asa 325mg PO x1 , Brilinta 180mg PO x1 and heparin drip for ACS. Trop 143. Received insulin/dex 50% and mike gluconate IV and lokema for K 5.9 (-->4.6). He was taken to cath lab for ongoing chest discomfort.  Patient went urgently to cath lab for sten to SVG to OM1 with IABP placed.    CCU Course  Patient admitted to CCU after LHC. LHC showed 70% stenosis of the proximal third of the saphenous vein graft from the aorta to the 1st obtuse marginal and 95% stenosis in the middle third of the saphenous vein graft from the aorta to the 1st obtuse marginal. Balloon angioplasty with stent was performed on the 95 % lesion in the middle third of the SVG and balloon angioplasty performed on the 70% lesion in thr proximal third of the SVG. Pt developed SOB during cath and was found to have crackles /wheezing and was given lasix 40mg IVP and solumedrol and Duoneb with resolution of symptoms. IABP was inserted in cath lab for further hemodynamic support in setting of unstable angina, LV dysfunction, and elevated LVEDP. Patient was started on DAPT (ASA and brilinta) and atorvastatin and home coreg and lasix were continued. Pt was started on hydralazine and isosorbide dinitrate for afterload reduction. IABP was removed on  without any complications. Echo  showed EF of 45% with mild segmental left ventricular systolic dysfunction with hypokinesis of the inferolateral wall. Nephrology consulted for MABLE superimposed on CKD. MABLE likely multifactorial, in setting of NSTEMI and BERENICE. Lasix held per nephrology recommendation given low BPs and appropriate urine output. Pt's chest pain has resolved and he is hemodynamically stable for transfer to floors.     MEDICATIONS:  STANDING MEDICATIONS  aspirin enteric coated 81 milliGRAM(s) Oral daily  atorvastatin 80 milliGRAM(s) Oral at bedtime  carvedilol 6.25 milliGRAM(s) Oral every 12 hours  chlorhexidine 4% Liquid 1 Application(s) Topical <User Schedule>  dextrose 40% Gel 15 Gram(s) Oral once  dextrose 5%. 1000 milliLiter(s) IV Continuous <Continuous>  dextrose 5%. 1000 milliLiter(s) IV Continuous <Continuous>  dextrose 50% Injectable 25 Gram(s) IV Push once  dextrose 50% Injectable 12.5 Gram(s) IV Push once  dextrose 50% Injectable 25 Gram(s) IV Push once  gabapentin 200 milliGRAM(s) Oral two times a day  glucagon  Injectable 1 milliGRAM(s) IntraMuscular once  heparin   Injectable 5000 Unit(s) SubCutaneous every 8 hours  hydrALAZINE 10 milliGRAM(s) Oral three times a day  insulin lispro (ADMELOG) corrective regimen sliding scale   SubCutaneous three times a day before meals  insulin lispro (ADMELOG) corrective regimen sliding scale   SubCutaneous at bedtime  isosorbide   dinitrate Tablet (ISORDIL) 10 milliGRAM(s) Oral three times a day  pantoprazole    Tablet 40 milliGRAM(s) Oral before breakfast  ticagrelor 90 milliGRAM(s) Oral every 12 hours    PRN MEDICATIONS      VITAL SIGNS: Last 24 Hours  T(C): 36.5 (01 Mar 2022 11:28), Max: 37.2 (01 Mar 2022 07:33)  T(F): 97.7 (01 Mar 2022 11:28), Max: 98.9 (01 Mar 2022 07:33)  HR: 84 (01 Mar 2022 13:00) (65 - 87)  BP: 97/56 (01 Mar 2022 13:00) (85/64 - 145/57)  BP(mean): 67 (01 Mar 2022 13:00) (62 - 88)  RR: 23 (01 Mar 2022 13:00) (8 - 23)  SpO2: 97% (01 Mar 2022 12:00) (92% - 100%)    LABS:                        12.6   9.50  )-----------( 140      ( 01 Mar 2022 07:59 )             37.6     03-01    137  |  103  |  39<H>  ----------------------------<  150<H>  4.1   |  21<L>  |  1.97<H>    Ca    8.7      01 Mar 2022 07:59  Phos  3.9     03-  Mg     2.30     03-01    TPro  6.4  /  Alb  3.6  /  TBili  0.9  /  DBili  x   /  AST  19  /  ALT  13  /  AlkPhos  46  03-01    PT/INR - ( 2022 00:26 )   PT: 12.9 sec;   INR: 1.11 ratio         PTT - ( 2022 00:26 )  PTT:74.4 sec  Urinalysis Basic - ( 2022 18:18 )    Color: Light Yellow / Appearance: Clear / S.021 / pH: x  Gluc: x / Ketone: Negative  / Bili: Negative / Urobili: <2 mg/dL   Blood: x / Protein: Trace / Nitrite: Negative   Leuk Esterase: Negative / RBC: x / WBC x   Sq Epi: x / Non Sq Epi: x / Bacteria: x          CARDIAC MARKERS ( 01 Mar 2022 07:59 )  x     / x     / 171 U/L / x     / x      CARDIAC MARKERS ( 2022 20:14 )  x     / x     / 230 U/L / x     / 3.2 ng/mL  CARDIAC MARKERS ( 2022 00:26 )  x     / x     / 300 U/L / x     / 5.0 ng/mL      Cath Lab Report -- Comprehensive Report  INDICATIONS: Initial NSTEMI. Positive Lily.    1ST LESION INTERVENTIONS: A successful balloon angioplasty with stent and  intravascular ultrasound was performed on the 95 % lesion in the middle  third of the saphenous vein graft from the aorta to the 1st obtuse  marginal. Following intervention there was a 1 % residual stenosis.  2ND LESION INTERVENTIONS: A balloon angioplasty with intravascular  ultrasound was performed on the 70 % lesion in the proximal third of the  saphenous vein graft from the aorta to the 1st obtuse marginal. Following  intervention there was a 20 % residual stenosis.  DIAGNOSTIC RECOMMENDATIONS: PCI of mid and proximal segments of SVG to OM1  for treatment of unstable angina/NSTEMI.  INTERVENTIONAL RECOMMENDATIONS: Continue aspirin, 81 mg, PO, daily. Add  ticagrelor 90 mg twice daily. Patient management should include aggressive  medical therapy and close monitoring of BUN and creatinine. Continue IABP  for 24-48 hours.  Prepared and signed by  Elpidio Olmstead M.D.  Signed 2022 19:12:18  HEMODYNAMIC TABLES  Pressures:  Baseline  Pressures:  - HR: 77  Pressures:  - Rhythm:  Pressures:  -- Aortic Pressure (S/D/M): 157/68/105  Pressures:  -- Left Ventricle (s/edp): 162/20/--  Pressures:  Intervention  Pressures:  - HR: 81  Pressures:  - Rhythm:  Pressures:  -- Aortic Pressure (S/D/M): 147/63/100  Outputs:  Baseline  Outputs:  -- CALCULATIONS[age]  Outputs:  -- CALCULATIONS: Body Surface Area: 1.92  Outputs:  -- CALCULATIONS: Height in cm: 172.00  Outputs:  -- CALCULATIONS: Sex: Male  Outputs:  -- CALCULATIONS: Weight in k.00  Outputs:  -- OUTPUTS: O2 consumption: 240.17  Outputs:  -- OUTPUTS: Vo2 Indexed: 125.00  Outputs:  Intervention  Outputs:  -- OUTPUTS: O2 consumption: 240.17  Outputs:  -- OUTPUTS: Vo2 Indexed: 125.00        ASSESSMENT & PLAN:     88M PMH CAD/MI (CABG + Stents X 5), PAD s/p Right LE Stent, HTN, HLD, DMT2, CKD, CVA (no residual), presented with near syncope and Chest pain, NSTEMI with STD in I, II, aVL,V2 to V6 s/p urgent PCI and  GEMMA to 95%  mSVG to OM1 and POBA to pSVG-OM1. Now s/p IABP removal on . Course c/b MABLE     NEURO:  #Near syncopal episode may have been vasovagal    - Currently A&Ox3  - Continue monitor  - Fall risk protocol     CV:  #NSTEMI (+trops, +EKG changes) ,+ trop | chest discomfort  - loaded with 325 mg ASA, 180mg Ticagrelor/nitroglycerin 0.4mg  - s/p LHC with GEMMA to mSVG to OM1 and POBA to pSVG-OM1  - Cont w.  ASA 81, Ticagrelor 90mg BID, lipitor 80mg ,coreg 6.25mg  - POCUS in cath showed EF 35%m, LVEDP 27,  - develop sob during procedure found to have crackles /wheezing  in cath lab, lasix 40mg IVP and solumedrol and Duoneb with good response  - s/p IABP   - isosorbide [id number] milliGRAM(s) Oral three times a day  - decrease hydralazine to 10 mg TID due to soft BPs   - DASH/CC Diet    #Hx CAD s/p 5 stents w. last stent placed in 2019  - c/w DAPT ASA and Brilinta, coreg 6.25mg Lipitor  80mg    - holding ACE-I due to MABLE  - RICHARD  in  indicated EF 45-50% w. hypokinesis of inferolateral wall  - Echo  indicated EF of 45% with Mild segmental left ventricular systolic dysfunction with hypokinesis of the inferolateral wall      Systolic CHF  - euvolemic on exam  - POCUS in cath lab showed EF 35%   - Crackles/wheezing during angioplasty s/p lasix 40mg IV / solumedrol   - hold lasix 20mg PO daily per nephro as BP on soft side and pt with 2.8L UOP in last 24 hours    PULM:  -No active issues    RENAL:  #MABLE superimposed on CKD  stage 3  - in setting of NSTEMI with contributing BERENICE, s/p left heart cath on   - SCr 1.97 today  - Cr 2.0 on admission, 1.7 in 2021  -SCreat on admission 2. SCr 2.0 in 2022  -Strict I+O- UOP 2.8 L in last 24 hours  - hold lasix 20mg PO daily per nephro as BP on soft side and pt with 2.8L UOP in last 24 hours  -Check Electrolytes and replenish to maintain K >4 and Mag >2  -Avoid nephrotoxic drugs  -nephrology consulted    GI:  #Diet: DASH/TLC w. consistent  carbs     ENDO:  #DM2  - on basaglar 35 units in AM, Jardiance 25 mg daily, and Tradjenta 5 mg daily at home  - HbA1c 8.8  - start lantus 10 units at bedtime and admelog 3 units before meals  - c/w low iss    #HLD  Lipid panel WNL  c/w lipitor 80mg PO daily for ACS      HEMATOLOGIC:  #mild normochromic normocytic anemia  - Hgb 12.6 today, 12.3 on admission  - monitor CBC    #DVT prophylaxis  -heparin 5000 units subQ q8h    ID:  -No active issues      Dispo: PT rec [facility]      For Follow-Up:  -f/u nephrology recommendations, monitor BUN and creatinine  -monitor BP. Can decrease Coreg if BP remains low  -resume lasix tomorrow if BP improved CCU Transfer Note    Transfer from: CCU  Transfer to:  (  ) Medicine    ( x ) Telemetry    (  ) RCU    (  ) Palliative    (  ) Stroke Unit    (  ) _______________  Accepting physician:    HPI:  This is an 88 year-old man with past medical history of CAD s/p CABG (), PCI stents x 5 (), PVD with stent place to Right Posterior Tibial Artery? (), T2DM, HTN, HLD, CKD, presented with near syncope and chest pain.  Patient's son states he has been feeling generalized fatigue for past few weeks but this morning nearly collapsed when standing and developed chest pressure.  Called his cardiologist, Dr. Corona, who advised he go to ED for evaluation.  Pt continues to report chest discomfort and generalized fatigue.  Was due for dental appt this morning and has not taken asa for few days. 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 Received asa 325mg PO x1 , Brilinta 180mg PO x1 and heparin drip for ACS. Trop 143. Received insulin/dex 50% and mike gluconate IV and lokema for K 5.9 (-->4.6). He was taken to cath lab for ongoing chest discomfort.  Patient went urgently to cath lab for sten to SVG to OM1 with IABP placed.    CCU Course  Patient admitted to CCU after LHC. LHC showed 70% stenosis of the proximal third of the saphenous vein graft from the aorta to the 1st obtuse marginal and 95% stenosis in the middle third of the saphenous vein graft from the aorta to the 1st obtuse marginal. Balloon angioplasty with stent was performed on the 95 % lesion in the middle third of the SVG and balloon angioplasty performed on the 70% lesion in thr proximal third of the SVG. Pt developed SOB during cath and was found to have crackles /wheezing and was given lasix 40mg IVP and solumedrol and Duoneb with resolution of symptoms. IABP was inserted in cath lab for further hemodynamic support in setting of unstable angina, LV dysfunction, and elevated LVEDP. Patient was started on DAPT (ASA and brilinta) and atorvastatin and home coreg and lasix were continued. Pt was started on hydralazine and isosorbide dinitrate for afterload reduction. IABP was removed on  without any complications. Echo  showed EF of 45% with mild segmental left ventricular systolic dysfunction with hypokinesis of the inferolateral wall. Nephrology consulted for MABLE superimposed on CKD. MABLE likely multifactorial, in setting of NSTEMI and BERENICE. Lasix held per nephrology recommendation given low BPs and appropriate urine output. Pt's chest pain has resolved and he is hemodynamically stable for transfer to floors.     MEDICATIONS:  STANDING MEDICATIONS  aspirin enteric coated 81 milliGRAM(s) Oral daily  atorvastatin 80 milliGRAM(s) Oral at bedtime  carvedilol 6.25 milliGRAM(s) Oral every 12 hours  chlorhexidine 4% Liquid 1 Application(s) Topical <User Schedule>  dextrose 40% Gel 15 Gram(s) Oral once  dextrose 5%. 1000 milliLiter(s) IV Continuous <Continuous>  dextrose 5%. 1000 milliLiter(s) IV Continuous <Continuous>  dextrose 50% Injectable 25 Gram(s) IV Push once  dextrose 50% Injectable 12.5 Gram(s) IV Push once  dextrose 50% Injectable 25 Gram(s) IV Push once  gabapentin 200 milliGRAM(s) Oral two times a day  glucagon  Injectable 1 milliGRAM(s) IntraMuscular once  heparin   Injectable 5000 Unit(s) SubCutaneous every 8 hours  hydrALAZINE 10 milliGRAM(s) Oral three times a day  insulin lispro (ADMELOG) corrective regimen sliding scale   SubCutaneous three times a day before meals  insulin lispro (ADMELOG) corrective regimen sliding scale   SubCutaneous at bedtime  isosorbide   dinitrate Tablet (ISORDIL) 10 milliGRAM(s) Oral three times a day  pantoprazole    Tablet 40 milliGRAM(s) Oral before breakfast  ticagrelor 90 milliGRAM(s) Oral every 12 hours    PRN MEDICATIONS      VITAL SIGNS: Last 24 Hours  T(C): 36.5 (01 Mar 2022 11:28), Max: 37.2 (01 Mar 2022 07:33)  T(F): 97.7 (01 Mar 2022 11:28), Max: 98.9 (01 Mar 2022 07:33)  HR: 84 (01 Mar 2022 13:00) (65 - 87)  BP: 97/56 (01 Mar 2022 13:00) (85/64 - 145/57)  BP(mean): 67 (01 Mar 2022 13:00) (62 - 88)  RR: 23 (01 Mar 2022 13:00) (8 - 23)  SpO2: 97% (01 Mar 2022 12:00) (92% - 100%)    LABS:                        12.6   9.50  )-----------( 140      ( 01 Mar 2022 07:59 )             37.6     03-01    137  |  103  |  39<H>  ----------------------------<  150<H>  4.1   |  21<L>  |  1.97<H>    Ca    8.7      01 Mar 2022 07:59  Phos  3.9     03-  Mg     2.30     03-01    TPro  6.4  /  Alb  3.6  /  TBili  0.9  /  DBili  x   /  AST  19  /  ALT  13  /  AlkPhos  46  03-01    PT/INR - ( 2022 00:26 )   PT: 12.9 sec;   INR: 1.11 ratio         PTT - ( 2022 00:26 )  PTT:74.4 sec  Urinalysis Basic - ( 2022 18:18 )    Color: Light Yellow / Appearance: Clear / S.021 / pH: x  Gluc: x / Ketone: Negative  / Bili: Negative / Urobili: <2 mg/dL   Blood: x / Protein: Trace / Nitrite: Negative   Leuk Esterase: Negative / RBC: x / WBC x   Sq Epi: x / Non Sq Epi: x / Bacteria: x          CARDIAC MARKERS ( 01 Mar 2022 07:59 )  x     / x     / 171 U/L / x     / x      CARDIAC MARKERS ( 2022 20:14 )  x     / x     / 230 U/L / x     / 3.2 ng/mL  CARDIAC MARKERS ( 2022 00:26 )  x     / x     / 300 U/L / x     / 5.0 ng/mL      Cath Lab Report -- Comprehensive Report  INDICATIONS: Initial NSTEMI. Positive Lily.    1ST LESION INTERVENTIONS: A successful balloon angioplasty with stent and  intravascular ultrasound was performed on the 95 % lesion in the middle  third of the saphenous vein graft from the aorta to the 1st obtuse  marginal. Following intervention there was a 1 % residual stenosis.  2ND LESION INTERVENTIONS: A balloon angioplasty with intravascular  ultrasound was performed on the 70 % lesion in the proximal third of the  saphenous vein graft from the aorta to the 1st obtuse marginal. Following  intervention there was a 20 % residual stenosis.  DIAGNOSTIC RECOMMENDATIONS: PCI of mid and proximal segments of SVG to OM1  for treatment of unstable angina/NSTEMI.  INTERVENTIONAL RECOMMENDATIONS: Continue aspirin, 81 mg, PO, daily. Add  ticagrelor 90 mg twice daily. Patient management should include aggressive  medical therapy and close monitoring of BUN and creatinine. Continue IABP  for 24-48 hours.  Prepared and signed by  Elpidio Olmstead M.D.  Signed 2022 19:12:18  HEMODYNAMIC TABLES  Pressures:  Baseline  Pressures:  - HR: 77  Pressures:  - Rhythm:  Pressures:  -- Aortic Pressure (S/D/M): 157/68/105  Pressures:  -- Left Ventricle (s/edp): 162/20/--  Pressures:  Intervention  Pressures:  - HR: 81  Pressures:  - Rhythm:  Pressures:  -- Aortic Pressure (S/D/M): 147/63/100  Outputs:  Baseline  Outputs:  -- CALCULATIONS[age]  Outputs:  -- CALCULATIONS: Body Surface Area: 1.92  Outputs:  -- CALCULATIONS: Height in cm: 172.00  Outputs:  -- CALCULATIONS: Sex: Male  Outputs:  -- CALCULATIONS: Weight in k.00  Outputs:  -- OUTPUTS: O2 consumption: 240.17  Outputs:  -- OUTPUTS: Vo2 Indexed: 125.00  Outputs:  Intervention  Outputs:  -- OUTPUTS: O2 consumption: 240.17  Outputs:  -- OUTPUTS: Vo2 Indexed: 125.00        ASSESSMENT & PLAN:     88M PMH CAD/MI (CABG + Stents X 5), PAD s/p Right LE Stent, HTN, HLD, DMT2, CKD, CVA (no residual), presented with near syncope and Chest pain, NSTEMI with STD in I, II, aVL,V2 to V6 s/p urgent PCI and  GEMMA to 95%  mSVG to OM1 and POBA to pSVG-OM1. Now s/p IABP removal on . Course c/b MABLE     NEURO:  #Near syncopal episode may have been vasovagal    - Currently A&Ox3  - Continue monitor  - Fall risk protocol     CV:  #NSTEMI (+trops, +EKG changes) ,+ trop | chest discomfort  - loaded with 325 mg ASA, 180mg Ticagrelor/nitroglycerin 0.4mg  - s/p LHC with GEMMA to mSVG to OM1 and POBA to pSVG-OM1  - Cont w.  ASA 81, Ticagrelor 90mg BID, lipitor 80mg ,coreg 6.25mg  - POCUS in cath showed EF 35%m, LVEDP 27,  - develop sob during procedure found to have crackles /wheezing  in cath lab, lasix 40mg IVP and solumedrol and Duoneb with good response  - s/p IABP   - isosorbide [id number] milliGRAM(s) Oral three times a day  - decrease hydralazine to 10 mg TID due to soft BPs   - DASH/CC Diet    #Hx CAD s/p 5 stents w. last stent placed in 2019  - c/w DAPT ASA and Brilinta, coreg 6.25mg Lipitor  80mg    - holding ACE-I due to MABLE  - RICHARD  in  indicated EF 45-50% w. hypokinesis of inferolateral wall  - Echo  indicated EF of 45% with Mild segmental left ventricular systolic dysfunction with hypokinesis of the inferolateral wall      Systolic CHF  - euvolemic on exam  - POCUS in cath lab showed EF 35%   - Crackles/wheezing during angioplasty s/p lasix 40mg IV / solumedrol   - hold lasix 20mg PO daily per nephro as BP on soft side and pt with 2.8L UOP in last 24 hours    PULM:  -No active issues    RENAL:  #MABLE superimposed on CKD  stage 3  - in setting of NSTEMI with contributing BERENICE, s/p left heart cath on   - SCr 1.97 today  - Cr 2.0 on admission, 1.7 in 2021  -SCreat on admission 2. SCr 2.0 in 2022  -Strict I+O- UOP 2.8 L in last 24 hours  - hold lasix 20mg PO daily per nephro as BP on soft side and pt with 2.8L UOP in last 24 hours  -Check Electrolytes and replenish to maintain K >4 and Mag >2  -Avoid nephrotoxic drugs  -nephrology consulted    GI:  #Diet: DASH/TLC w. consistent  carbs     ENDO:  #DM2  - on basaglar 35 units in AM, Jardiance 25 mg daily, and Tradjenta 5 mg daily at home  - HbA1c 8.8  - start lantus 10 units at bedtime and admelog 3 units before meals  - c/w low iss    #HLD  Lipid panel WNL  c/w lipitor 80mg PO daily for ACS      HEMATOLOGIC:  #mild normochromic normocytic anemia  - Hgb 12.6 today, 12.3 on admission  - monitor CBC    #DVT prophylaxis  -heparin 5000 units subQ q8h    ID:  -No active issues      Dispo: PT rec [facility]. Family may want to take patient home      For Follow-Up:  -f/u nephrology recommendations, monitor BUN and creatinine  -holding lasix. Resume lasix tomorrow if BP has improved  -f/u with  regarding R[facility]. Family may want to take patient home CCU Transfer Note    Transfer from: CCU  Transfer to:  (  ) Medicine    ( x ) Telemetry    (  ) RCU    (  ) Palliative    (  ) Stroke Unit    (  ) _______________  Accepting physician: Dr. Casiano    HPI:  This is an 88 year-old man with past medical history of CAD s/p CABG (), PCI stents x 5 (), PVD with stent place to Right Posterior Tibial Artery? (), T2DM, HTN, HLD, CKD, presented with near syncope and chest pain.  Patient's son states he has been feeling generalized fatigue for past few weeks but this morning nearly collapsed when standing and developed chest pressure.  Called his cardiologist, Dr. Corona, who advised he go to ED for evaluation.  Pt continues to report chest discomfort and generalized fatigue.  Was due for dental appt this morning and has not taken asa for few days. 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 Received asa 325mg PO x1 , Brilinta 180mg PO x1 and heparin drip for ACS. Trop 143. Received insulin/dex 50% and mike gluconate IV and lokema for K 5.9 (-->4.6). He was taken to cath lab for ongoing chest discomfort.  Patient went urgently to cath lab for sten to SVG to OM1 with IABP placed.    CCU Course  Patient admitted to CCU after LHC. LHC showed 70% stenosis of the proximal third of the saphenous vein graft from the aorta to the 1st obtuse marginal and 95% stenosis in the middle third of the saphenous vein graft from the aorta to the 1st obtuse marginal. Balloon angioplasty with stent was performed on the 95 % lesion in the middle third of the SVG and balloon angioplasty performed on the 70% lesion in thr proximal third of the SVG. Pt developed SOB during cath and was found to have crackles /wheezing and was given lasix 40mg IVP and solumedrol and Duoneb with resolution of symptoms. IABP was inserted in cath lab for further hemodynamic support in setting of unstable angina, LV dysfunction, and elevated LVEDP. Patient was started on DAPT (ASA and brilinta) and atorvastatin and home coreg and lasix were continued. Pt was started on hydralazine and isosorbide dinitrate for afterload reduction. IABP was removed on  without any complications. Echo  showed EF of 45% with mild segmental left ventricular systolic dysfunction with hypokinesis of the inferolateral wall. Nephrology consulted for MABLE superimposed on CKD. MABLE likely multifactorial, in setting of NSTEMI and BERENICE. Lasix held per nephrology recommendation given low BPs and appropriate urine output. Pt's chest pain has resolved and he is hemodynamically stable for transfer to floors.     MEDICATIONS:  STANDING MEDICATIONS  aspirin enteric coated 81 milliGRAM(s) Oral daily  atorvastatin 80 milliGRAM(s) Oral at bedtime  carvedilol 6.25 milliGRAM(s) Oral every 12 hours  chlorhexidine 4% Liquid 1 Application(s) Topical <User Schedule>  dextrose 40% Gel 15 Gram(s) Oral once  dextrose 5%. 1000 milliLiter(s) IV Continuous <Continuous>  dextrose 5%. 1000 milliLiter(s) IV Continuous <Continuous>  dextrose 50% Injectable 25 Gram(s) IV Push once  dextrose 50% Injectable 12.5 Gram(s) IV Push once  dextrose 50% Injectable 25 Gram(s) IV Push once  gabapentin 200 milliGRAM(s) Oral two times a day  glucagon  Injectable 1 milliGRAM(s) IntraMuscular once  heparin   Injectable 5000 Unit(s) SubCutaneous every 8 hours  hydrALAZINE 10 milliGRAM(s) Oral three times a day  insulin lispro (ADMELOG) corrective regimen sliding scale   SubCutaneous three times a day before meals  insulin lispro (ADMELOG) corrective regimen sliding scale   SubCutaneous at bedtime  isosorbide   dinitrate Tablet (ISORDIL) 10 milliGRAM(s) Oral three times a day  pantoprazole    Tablet 40 milliGRAM(s) Oral before breakfast  ticagrelor 90 milliGRAM(s) Oral every 12 hours    PRN MEDICATIONS      VITAL SIGNS: Last 24 Hours  T(C): 36.5 (01 Mar 2022 11:28), Max: 37.2 (01 Mar 2022 07:33)  T(F): 97.7 (01 Mar 2022 11:28), Max: 98.9 (01 Mar 2022 07:33)  HR: 84 (01 Mar 2022 13:00) (65 - 87)  BP: 97/56 (01 Mar 2022 13:00) (85/64 - 145/57)  BP(mean): 67 (01 Mar 2022 13:00) (62 - 88)  RR: 23 (01 Mar 2022 13:00) (8 - 23)  SpO2: 97% (01 Mar 2022 12:00) (92% - 100%)    LABS:                        12.6   9.50  )-----------( 140      ( 01 Mar 2022 07:59 )             37.6     03-01    137  |  103  |  39<H>  ----------------------------<  150<H>  4.1   |  21<L>  |  1.97<H>    Ca    8.7      01 Mar 2022 07:59  Phos  3.9     03-  Mg     2.30     03-01    TPro  6.4  /  Alb  3.6  /  TBili  0.9  /  DBili  x   /  AST  19  /  ALT  13  /  AlkPhos  46  03-01    PT/INR - ( 2022 00:26 )   PT: 12.9 sec;   INR: 1.11 ratio         PTT - ( 2022 00:26 )  PTT:74.4 sec  Urinalysis Basic - ( 2022 18:18 )    Color: Light Yellow / Appearance: Clear / S.021 / pH: x  Gluc: x / Ketone: Negative  / Bili: Negative / Urobili: <2 mg/dL   Blood: x / Protein: Trace / Nitrite: Negative   Leuk Esterase: Negative / RBC: x / WBC x   Sq Epi: x / Non Sq Epi: x / Bacteria: x          CARDIAC MARKERS ( 01 Mar 2022 07:59 )  x     / x     / 171 U/L / x     / x      CARDIAC MARKERS ( 2022 20:14 )  x     / x     / 230 U/L / x     / 3.2 ng/mL  CARDIAC MARKERS ( 2022 00:26 )  x     / x     / 300 U/L / x     / 5.0 ng/mL      Cath Lab Report -- Comprehensive Report  INDICATIONS: Initial NSTEMI. Positive Lily.    1ST LESION INTERVENTIONS: A successful balloon angioplasty with stent and  intravascular ultrasound was performed on the 95 % lesion in the middle  third of the saphenous vein graft from the aorta to the 1st obtuse  marginal. Following intervention there was a 1 % residual stenosis.  2ND LESION INTERVENTIONS: A balloon angioplasty with intravascular  ultrasound was performed on the 70 % lesion in the proximal third of the  saphenous vein graft from the aorta to the 1st obtuse marginal. Following  intervention there was a 20 % residual stenosis.  DIAGNOSTIC RECOMMENDATIONS: PCI of mid and proximal segments of SVG to OM1  for treatment of unstable angina/NSTEMI.  INTERVENTIONAL RECOMMENDATIONS: Continue aspirin, 81 mg, PO, daily. Add  ticagrelor 90 mg twice daily. Patient management should include aggressive  medical therapy and close monitoring of BUN and creatinine. Continue IABP  for 24-48 hours.  Prepared and signed by  Elpidio Olmstead M.D.  Signed 2022 19:12:18  HEMODYNAMIC TABLES  Pressures:  Baseline  Pressures:  - HR: 77  Pressures:  - Rhythm:  Pressures:  -- Aortic Pressure (S/D/M): 157/68/105  Pressures:  -- Left Ventricle (s/edp): 162/20/--  Pressures:  Intervention  Pressures:  - HR: 81  Pressures:  - Rhythm:  Pressures:  -- Aortic Pressure (S/D/M): 147/63/100  Outputs:  Baseline  Outputs:  -- CALCULATIONS[age]  Outputs:  -- CALCULATIONS: Body Surface Area: 1.92  Outputs:  -- CALCULATIONS: Height in cm: 172.00  Outputs:  -- CALCULATIONS: Sex: Male  Outputs:  -- CALCULATIONS: Weight in k.00  Outputs:  -- OUTPUTS: O2 consumption: 240.17  Outputs:  -- OUTPUTS: Vo2 Indexed: 125.00  Outputs:  Intervention  Outputs:  -- OUTPUTS: O2 consumption: 240.17  Outputs:  -- OUTPUTS: Vo2 Indexed: 125.00        ASSESSMENT & PLAN:     88M PMH CAD/MI (CABG + Stents X 5), PAD s/p Right LE Stent, HTN, HLD, DMT2, CKD, CVA (no residual), presented with near syncope and Chest pain, NSTEMI with STD in I, II, aVL,V2 to V6 s/p urgent PCI and  GEMMA to 95%  mSVG to OM1 and POBA to pSVG-OM1. Now s/p IABP removal on . Course c/b MABLE     NEURO:  #Near syncopal episode may have been vasovagal    - Currently A&Ox3  - Continue monitor  - Fall risk protocol     CV:  #NSTEMI (+trops, +EKG changes) ,+ trop | chest discomfort  - loaded with 325 mg ASA, 180mg Ticagrelor/nitroglycerin 0.4mg  - s/p LHC with GEMMA to mSVG to OM1 and POBA to pSVG-OM1  - Cont w.  ASA 81, Ticagrelor 90mg BID, lipitor 80mg ,coreg 6.25mg  - POCUS in cath showed EF 35%m, LVEDP 27,  - develop sob during procedure found to have crackles /wheezing  in cath lab, lasix 40mg IVP and solumedrol and Duoneb with good response  - s/p IABP   - isosorbide [id number] milliGRAM(s) Oral three times a day  - decrease hydralazine to 10 mg TID due to soft BPs   - DASH/CC Diet    #Hx CAD s/p 5 stents w. last stent placed in 2019  - c/w DAPT ASA and Brilinta, coreg 6.25mg Lipitor  80mg    - holding ACE-I due to MABLE  - RICHARD  in  indicated EF 45-50% w. hypokinesis of inferolateral wall  - Echo  indicated EF of 45% with Mild segmental left ventricular systolic dysfunction with hypokinesis of the inferolateral wall      Systolic CHF  - euvolemic on exam  - POCUS in cath lab showed EF 35%   - Crackles/wheezing during angioplasty s/p lasix 40mg IV / solumedrol   - hold lasix 20mg PO daily per nephro as BP on soft side and pt with 2.8L UOP in last 24 hours    PULM:  -No active issues    RENAL:  #MABLE superimposed on CKD  stage 3  - in setting of NSTEMI with contributing BERENICE, s/p left heart cath on   - SCr 1.97 today  - Cr 2.0 on admission, 1.7 in 2021  -SCreat on admission 2. SCr 2.0 in 2022  -Strict I+O- UOP 2.8 L in last 24 hours  - hold lasix 20mg PO daily per nephro as BP on soft side and pt with 2.8L UOP in last 24 hours  -Check Electrolytes and replenish to maintain K >4 and Mag >2  -Avoid nephrotoxic drugs  -nephrology consulted    GI:  #Diet: DASH/TLC w. consistent  carbs     ENDO:  #DM2  - on basaglar 35 units in AM, Jardiance 25 mg daily, and Tradjenta 5 mg daily at home  - HbA1c 8.8  - start lantus 10 units at bedtime and admelog 3 units before meals  - c/w low iss    #HLD  Lipid panel WNL  c/w lipitor 80mg PO daily for ACS      HEMATOLOGIC:  #mild normochromic normocytic anemia  - Hgb 12.6 today, 12.3 on admission  - monitor CBC    #DVT prophylaxis  -heparin 5000 units subQ q8h    ID:  -No active issues      Dispo: PT rec [facility]. Family may want to take patient home      For Follow-Up:  -f/u nephrology recommendations, monitor BUN and creatinine  -holding lasix. Resume lasix tomorrow if BP has improved  -f/u with  regarding [facility]. Family may want to take patient home

## 2022-03-01 NOTE — PROGRESS NOTE ADULT - ASSESSMENT
88M PMH CAD/MI (CABG + Stents X 5), PAD s/p Right LE Stent, HTN, HLD, DMT2, CKD, CVA (no residual), presented with near syncope and Chest pain, STEMI with ROCIO in III + AVR s/p urgent PCI and  GEMMA to 95%  mSVG to OM1 and POBA to pSVG-OM1.  s/p IABP removal        NEURO:  #Near syncopal episode may have been vasovagal    - Currently A&Ox3  - Continue monitor  - Fall risk protocol     CV:  #STEMI (+trops, +EKG changes) ,+ trop | chest discomfort  - loaded with 325 mg ASA, 180mg Ticagrelor/nitroglycerin 0.4mg  - s/p LHC with GEMMA to mSVG to OM1 and POBA to pSVG-OM1  - Cont w.  ASA 81, Ticagrelor 90mg BID, lipitor 80mg ,coreg 6.25mg  - POCUS in cath showed EF 35%m, LVEDP 27,  - develop sob during procedure found to have crackles /wheezing  in cath lab, lasix 40mg IVP and solumedrol and Duoneb with good response  - s/p IABP   - on hydralazine /isordil for afterload reduction  - DASH/CC Diet  - trend CK/Trp    #Hx CAD s/p 5 stents w. last stent placed in 5/2019  - c/w DAPT ASA and Brilinta, coreg 6.25mg Lipitor  80mg    - holding ACE-I due to MABLE  - RICHARD  in 2019 indicated EF 45-50% w. hypokinesis of inferolateral wall  - Echo 2/28 indicated EF of 45% with Mild segmental left ventricular systolic dysfunction with hypokinesis of the inferolateral wall      systolic CHF  - POCUS in cath lab showed EF 35%   -Crackles/wheezing during angioplasty s/p lasix 40mg IV / solumedrol   - c/w home dose  lasix 20mg PO daily    PULM:  -No active issues    RENAL:  #MABLE superimposed on CKD    -possible BERENICE, s/p left heart cath on 2/26  - SCreat on admission 2, today 2.32  -Strict I+O- UOP 2.8 L in last 24 hours  -Check Electrolytes and replenish to maintain K >4 and Mag >2  -Avoid nephrotoxic drugs  -nephrology consulted    GI:  #Diet: DASH/TLC w. consistent  carbs     ENDO:  #DM2  - HbA1c 8.8  - c/w  Insulin Sliding Scale    #HLD  Lipid panel WNL  c/w lipitor 80mg PO daily for ACS      HEMATOLOGIC:  #mild normochromic normocytic anemia  - Monitor for now     #DVT prophylaxis  -heparin 5000 units subQ q8h    ID:  -No active issues    SKIN:  #Lines:   Left femoral IABP     88M PMH CAD/MI (CABG + Stents X 5), PAD s/p Right LE Stent, HTN, HLD, DMT2, CKD, CVA (no residual), presented with near syncope and Chest pain, STEMI with ROCIO in III + AVR s/p urgent PCI and  GEMMA to 95%  mSVG to OM1 and POBA to pSVG-OM1.  s/p IABP removal        NEURO:  #Near syncopal episode may have been vasovagal    - Currently A&Ox3  - Continue monitor  - Fall risk protocol     CV:  #STEMI (+trops, +EKG changes) ,+ trop | chest discomfort  - loaded with 325 mg ASA, 180mg Ticagrelor/nitroglycerin 0.4mg  - s/p LHC with GEMMA to mSVG to OM1 and POBA to pSVG-OM1  - Cont w.  ASA 81, Ticagrelor 90mg BID, lipitor 80mg ,coreg 6.25mg  - POCUS in cath showed EF 35%m, LVEDP 27,  - develop sob during procedure found to have crackles /wheezing  in cath lab, lasix 40mg IVP and solumedrol and Duoneb with good response  - s/p IABP   - on hydralazine /isordil for afterload reduction  - DASH/CC Diet  - trend CK/Trp    #Hx CAD s/p 5 stents w. last stent placed in 5/2019  - c/w DAPT ASA and Brilinta, coreg 6.25mg Lipitor  80mg    - holding ACE-I due to MABLE  - RICHARD  in 2019 indicated EF 45-50% w. hypokinesis of inferolateral wall  - Echo 2/28 indicated EF of 45% with Mild segmental left ventricular systolic dysfunction with hypokinesis of the inferolateral wall      systolic CHF  - POCUS in cath lab showed EF 35%   -Crackles/wheezing during angioplasty s/p lasix 40mg IV / solumedrol   - c/w home dose  lasix 20mg PO daily    PULM:  -No active issues    RENAL:  #MABLE superimposed on CKD    -possible BERENICE, s/p left heart cath on 2/26  - SCreat on admission 2, today 2.32  -Strict I+O- UOP 2.8 L in last 24 hours  -Check Electrolytes and replenish to maintain K >4 and Mag >2  -Avoid nephrotoxic drugs  -nephrology consulted    GI:  #Diet: DASH/TLC w. consistent  carbs     ENDO:  #DM2  - HbA1c 8.8  - c/w  Insulin Sliding Scale    #HLD  Lipid panel WNL  c/w lipitor 80mg PO daily for ACS      HEMATOLOGIC:  #mild normochromic normocytic anemia  - stable  -Hgb    #DVT prophylaxis  -heparin 5000 units subQ q8h    ID:  -No active issues    SKIN:  #Lines:   Left femoral IABP    Dispo: PT rec [facility]   88M PMH CAD/MI (CABG + Stents X 5), PAD s/p Right LE Stent, HTN, HLD, DMT2, CKD, CVA (no residual), presented with near syncope and Chest pain, STEMI with ROCIO in III + AVR s/p urgent PCI and  GEMMA to 95%  mSVG to OM1 and POBA to pSVG-OM1.  s/p IABP removal on 2/28. Course c/b MABLE       NEURO:  #Near syncopal episode may have been vasovagal    - Currently A&Ox3  - Continue monitor  - Fall risk protocol     CV:  #STEMI (+trops, +EKG changes) ,+ trop | chest discomfort  - loaded with 325 mg ASA, 180mg Ticagrelor/nitroglycerin 0.4mg  - s/p LHC with GEMMA to mSVG to OM1 and POBA to pSVG-OM1  - Cont w.  ASA 81, Ticagrelor 90mg BID, lipitor 80mg ,coreg 6.25mg  - POCUS in cath showed EF 35%m, LVEDP 27,  - develop sob during procedure found to have crackles /wheezing  in cath lab, lasix 40mg IVP and solumedrol and Duoneb with good response  - s/p IABP   - on hydralazine /isordil for afterload reduction  - DASH/CC Diet  - trend CK/Trp    #Hx CAD s/p 5 stents w. last stent placed in 5/2019  - c/w DAPT ASA and Brilinta, coreg 6.25mg Lipitor  80mg    - holding ACE-I due to MABLE  - RICHARD  in 2019 indicated EF 45-50% w. hypokinesis of inferolateral wall  - Echo 2/28 indicated EF of 45% with Mild segmental left ventricular systolic dysfunction with hypokinesis of the inferolateral wall      systolic CHF  - POCUS in cath lab showed EF 35%   -Crackles/wheezing during angioplasty s/p lasix 40mg IV / solumedrol   - c/w home dose  lasix 20mg PO daily    PULM:  -No active issues    RENAL:  #MABLE superimposed on CKD    -possible BERENICE, s/p left heart cath on 2/26  - SCreat on admission 2, today 2.32  -Strict I+O- UOP 2.8 L in last 24 hours  -Check Electrolytes and replenish to maintain K >4 and Mag >2  -Avoid nephrotoxic drugs  -nephrology consulted    GI:  #Diet: DASH/TLC w. consistent  carbs     ENDO:  #DM2  - HbA1c 8.8  - c/w  Insulin Sliding Scale    #HLD  Lipid panel WNL  c/w lipitor 80mg PO daily for ACS      HEMATOLOGIC:  #mild normochromic normocytic anemia  - stable  -Hgb    #DVT prophylaxis  -heparin 5000 units subQ q8h    ID:  -No active issues    SKIN:  #Lines:   Left femoral IABP    Dispo: PT rec [facility]   88M PMH CAD/MI (CABG + Stents X 5), PAD s/p Right LE Stent, HTN, HLD, DMT2, CKD, CVA (no residual), presented with near syncope and Chest pain, NSTEMI with STD in I, aVL,V2 to V6 s/p urgent PCI and  GEMMA to 95%  mSVG to OM1 and POBA to pSVG-OM1. Now s/p IABP removal on 2/28. Course c/b MABLE       NEURO:  #Near syncopal episode may have been vasovagal    - Currently A&Ox3  - Continue monitor  - Fall risk protocol     CV:  #NSTEMI (+trops, +EKG changes) ,+ trop | chest discomfort  - loaded with 325 mg ASA, 180mg Ticagrelor/nitroglycerin 0.4mg  - s/p LHC with GEMMA to mSVG to OM1 and POBA to pSVG-OM1  - Cont w.  ASA 81, Ticagrelor 90mg BID, lipitor 80mg ,coreg 6.25mg  - POCUS in cath showed EF 35%m, LVEDP 27,  - develop sob during procedure found to have crackles /wheezing  in cath lab, lasix 40mg IVP and solumedrol and Duoneb with good response  - s/p IABP   - isosorbide [id number] milliGRAM(s) Oral three times a day  - decrease hydralazine to 10 mg TID due to borderline hypotension   - DASH/CC Diet    #Hx CAD s/p 5 stents w. last stent placed in 5/2019  - c/w DAPT ASA and Brilinta, coreg 6.25mg Lipitor  80mg    - holding ACE-I due to MABLE  - RICHARD  in 2019 indicated EF 45-50% w. hypokinesis of inferolateral wall  - Echo 2/28 indicated EF of 45% with Mild segmental left ventricular systolic dysfunction with hypokinesis of the inferolateral wall      systolic CHF  - euvolemic on exam  - POCUS in cath lab showed EF 35%   - Crackles/wheezing during angioplasty s/p lasix 40mg IV / solumedrol   - hold lasix 20mg PO daily per nephro as BP on soft side and pt with 2.8L UOP in last 24 hours    PULM:  -No active issues    RENAL:  #MABLE superimposed on CKD  stage 3  - in setting of NSTEMI with contributing BERENICE, s/p left heart cath on 2/26  - SCr 1.97 today  - Cr 2.0 on admission, 1.7 in July 2021  -SCreat on admission 2. SCr 2.0 in January 2022  -Strict I+O- UOP 2.8 L in last 24 hours  - hold lasix 20mg PO daily per nephro as BP on soft side and pt with 2.8L UOP in last 24 hours  -Check Electrolytes and replenish to maintain K >4 and Mag >2  -Avoid nephrotoxic drugs  -nephrology consulted    GI:  #Diet: DASH/TLC w. consistent  carbs     ENDO:  #DM2  - HbA1c 8.8  - c/w Insulin Sliding Scale  - consider starting basal/bolus regimen if blood glucose remains above goal (100-180) today    #HLD  Lipid panel WNL  c/w lipitor 80mg PO daily for ACS      HEMATOLOGIC:  #mild normochromic normocytic anemia  - stable  - Hgb 12.6 today, 12.3 on admission  - monitor CBC    #DVT prophylaxis  -heparin 5000 units subQ q8h    ID:  -No active issues    SKIN:  #Lines:   Left femoral IABP    Dispo: PT rec [facility]   88M PMH CAD/MI (CABG + Stents X 5), PAD s/p Right LE Stent, HTN, HLD, DMT2, CKD, CVA (no residual), presented with near syncope and Chest pain, NSTEMI with STD in I, aVL,V2 to V6 s/p urgent PCI and  GEMMA to 95%  mSVG to OM1 and POBA to pSVG-OM1. Now s/p IABP removal on 2/28. Course c/b MABLE       NEURO:  #Near syncopal episode may have been vasovagal    - Currently A&Ox3  - Continue monitor  - Fall risk protocol     CV:  #NSTEMI (+trops, +EKG changes) ,+ trop | chest discomfort  - loaded with 325 mg ASA, 180mg Ticagrelor/nitroglycerin 0.4mg  - s/p LHC with GEMMA to mSVG to OM1 and POBA to pSVG-OM1  - Cont w.  ASA 81, Ticagrelor 90mg BID, lipitor 80mg ,coreg 6.25mg  - POCUS in cath showed EF 35%m, LVEDP 27,  - develop sob during procedure found to have crackles /wheezing  in cath lab, lasix 40mg IVP and solumedrol and Duoneb with good response  - s/p IABP   - isosorbide [id number] milliGRAM(s) Oral three times a day  - decrease hydralazine to 10 mg TID due to soft BPs   - DASH/CC Diet    #Hx CAD s/p 5 stents w. last stent placed in 5/2019  - c/w DAPT ASA and Brilinta, coreg 6.25mg Lipitor  80mg    - holding ACE-I due to MABLE  - RICHARD  in 2019 indicated EF 45-50% w. hypokinesis of inferolateral wall  - Echo 2/28 indicated EF of 45% with Mild segmental left ventricular systolic dysfunction with hypokinesis of the inferolateral wall      systolic CHF  - euvolemic on exam  - POCUS in cath lab showed EF 35%   - Crackles/wheezing during angioplasty s/p lasix 40mg IV / solumedrol   - hold lasix 20mg PO daily per nephro as BP on soft side and pt with 2.8L UOP in last 24 hours    PULM:  -No active issues    RENAL:  #MABLE superimposed on CKD  stage 3  - in setting of NSTEMI with contributing BERENICE, s/p left heart cath on 2/26  - SCr 1.97 today  - Cr 2.0 on admission, 1.7 in July 2021  -SCreat on admission 2. SCr 2.0 in January 2022  -Strict I+O- UOP 2.8 L in last 24 hours  - hold lasix 20mg PO daily per nephro as BP on soft side and pt with 2.8L UOP in last 24 hours  -Check Electrolytes and replenish to maintain K >4 and Mag >2  -Avoid nephrotoxic drugs  -nephrology consulted    GI:  #Diet: DASH/TLC w. consistent  carbs     ENDO:  #DM2  - HbA1c 8.8  - c/w Insulin Sliding Scale  - consider starting basal/bolus regimen if blood glucose remains above goal (100-180) today    #HLD  Lipid panel WNL  c/w lipitor 80mg PO daily for ACS      HEMATOLOGIC:  #mild normochromic normocytic anemia  - stable  - Hgb 12.6 today, 12.3 on admission  - monitor CBC    #DVT prophylaxis  -heparin 5000 units subQ q8h    ID:  -No active issues    SKIN:  #Lines:   Left femoral IABP    Dispo: PT rec [facility]   88M PMH CAD/MI (CABG + Stents X 5), PAD s/p Right LE Stent, HTN, HLD, DMT2, CKD, CVA (no residual), presented with near syncope and Chest pain, NSTEMI with STD in I, aVL,V2 to V6 s/p urgent PCI and  GEMMA to 95%  mSVG to OM1 and POBA to pSVG-OM1. Now s/p IABP removal on 2/28. Course c/b MABLE       NEURO:  #Near syncopal episode may have been vasovagal    - Currently A&Ox3  - Continue monitor  - Fall risk protocol     CV:  #NSTEMI (+trops, +EKG changes) ,+ trop | chest discomfort  - loaded with 325 mg ASA, 180mg Ticagrelor/nitroglycerin 0.4mg  - s/p LHC with GEMMA to mSVG to OM1 and POBA to pSVG-OM1  - Cont w.  ASA 81, Ticagrelor 90mg BID, lipitor 80mg ,coreg 6.25mg  - POCUS in cath showed EF 35%m, LVEDP 27,  - develop sob during procedure found to have crackles /wheezing  in cath lab, lasix 40mg IVP and solumedrol and Duoneb with good response  - s/p IABP   - isosorbide [id number] milliGRAM(s) Oral three times a day  - decrease hydralazine to 10 mg TID due to soft BPs   - DASH/CC Diet    #Hx CAD s/p 5 stents w. last stent placed in 5/2019  - c/w DAPT ASA and Brilinta, coreg 6.25mg Lipitor  80mg    - holding ACE-I due to MABLE  - RICHARD  in 2019 indicated EF 45-50% w. hypokinesis of inferolateral wall  - Echo 2/28 indicated EF of 45% with Mild segmental left ventricular systolic dysfunction with hypokinesis of the inferolateral wall      systolic CHF  - euvolemic on exam  - POCUS in cath lab showed EF 35%   - Crackles/wheezing during angioplasty s/p lasix 40mg IV / solumedrol   - hold lasix 20mg PO daily per nephro as BP on soft side and pt with 2.8L UOP in last 24 hours    PULM:  -No active issues    RENAL:  #MABLE superimposed on CKD  stage 3  - in setting of NSTEMI with contributing BERENICE, s/p left heart cath on 2/26  - SCr 1.97 today  - Cr 2.0 on admission, 1.7 in July 2021  -SCreat on admission 2. SCr 2.0 in January 2022  -Strict I+O- UOP 2.8 L in last 24 hours  - hold lasix 20mg PO daily per nephro as BP on soft side and pt with 2.8L UOP in last 24 hours  -Check Electrolytes and replenish to maintain K >4 and Mag >2  -Avoid nephrotoxic drugs  -nephrology consulted    GI:  #Diet: DASH/TLC w. consistent  carbs     ENDO:  #DM2  - on basaglar 35 units in AM, Jardiance 25 mg daily, and Tradjenta 5 mg daily at home  - HbA1c 8.8  - start lantus 10 units at bedtime and admelog 3 units before meals  - c/w low iss    #HLD  Lipid panel WNL  c/w lipitor 80mg PO daily for ACS      HEMATOLOGIC:  #mild normochromic normocytic anemia  - Hgb 12.6 today, 12.3 on admission  - monitor CBC    #DVT prophylaxis  -heparin 5000 units subQ q8h    ID:  -No active issues    SKIN:  #Lines:   Left femoral IABP    Dispo: PT rec [facility]

## 2022-03-01 NOTE — CHART NOTE - NSCHARTNOTEFT_GEN_A_CORE
MAR Accept Note  Transfer to: Telemetry  Accepting Attending Physician: Dr. Casiano  Assigned Room: 4N 42    Patient seen and examined.   Labs and data reviewed.   No findings precluding transfer of service.     HPI/MICU COURSE:   Please refer to MICU transfer note for full details. Briefly, this is a This is an 88 year-old man with past medical history of CAD s/p CABG (1999), PCI stents x 5 (2019), PVD with stent place to Right Posterior Tibial Artery? (2021), T2DM, HTN, HLD, CKD, presented with near syncope and chest pain. In ED, EKG showed  ROCIO in aVR ,STD in I, aVL,V2 to V6 with 1st degree /LAFB/ RBBB. Received asa 325mg PO x1 , Brilinta 180mg PO x1 and heparin drip for ACS. Trop 143. Received insulin/dex 50% and mike gluconate IV and lokema for K 5.9 (-->4.6). He was taken to cath lab for ongoing chest discomfort.  Patient went urgently to cath lab for stent to SVG to OM1 with IABP placed.    Patient admitted to CCU after LHC. IABP was inserted in cath lab for further hemodynamic support in setting of unstable angina, LV dysfunction, and elevated LVEDP. Patient was started on DAPT (ASA and brilinta) and atorvastatin and home coreg and lasix were continued. Pt was started on hydralazine and isosorbide dinitrate for afterload reduction. IABP was removed on 2/28 without any complications. Echo 2/28 showed EF of 45% with mild segmental left ventricular systolic dysfunction with hypokinesis of the inferolateral wall. Nephrology consulted for MABLE superimposed on CKD. MABLE likely multifactorial, in setting of NSTEMI and BERENICE. Lasix held per nephrology recommendation given low BPs and appropriate urine output. Pt's chest pain has resolved and he is hemodynamically stable for transfer to floors.     FOR FOLLOW-UP:  [ ]f/u nephrology recommendations, monitor BUN and creatinine  [ ]Lasix currently being held in setting of soft BPs. Resume lasix tomorrow if BP has improved  [ ]f/u with  regarding RUDOLPH    Chary Webster MD  PGY-3, Internal Medicine  MAR Spectra: [phone]

## 2022-03-01 NOTE — PHYSICAL THERAPY INITIAL EVALUATION ADULT - PERTINENT HX OF CURRENT PROBLEM, REHAB EVAL
pt is an 88 male, PMH CAD/MI (CABG + Stents X 5), PAD s/p Right LE Stent, HTN, HLD, DMT2, CKD, CVA (no residual), presented with near syncope and Chest pain, STEMI with ROCIO in III + AVR s/p urgent cath with GEMMA to SVG to OM1.  IABP placed for coronary artery perfusion. 2/28 IABP removed

## 2022-03-01 NOTE — PHYSICAL THERAPY INITIAL EVALUATION ADULT - MANUAL MUSCLE TESTING RESULTS, REHAB EVAL
cardiac precautions, right UE: 3/5, left UE: 3-/5, right lower extremity: 3-/5, Left Lower Extremity: 3/5/grossly assessed due to

## 2022-03-01 NOTE — PROGRESS NOTE ADULT - ATTENDING COMMENTS
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 improved today   fluid management per cardiology   repeat UA  renal US when able

## 2022-03-01 NOTE — PROGRESS NOTE ADULT - SUBJECTIVE AND OBJECTIVE BOX
[facility] DIVISION OF KIDNEY DISEASES AND HYPERTENSION -- FOLLOW UP NOTE  --------------------------------------------------------------------------------  HPI:  Pt. is an 88 year-old man with past medical history of CAD s/p CABG (1999), PCI stents x 5 (2019), PVD with stent place to Right Posterior Tibial Artery? (2021), T2DM, HTN, HLD, CKD, presented with near syncope and chest pain.  Patient's son states he has been feeling generalized fatigue for past few weeks but this morning nearly collapsed when standing and developed chest pressure.  Called his cardiologist, Dr. Corona, who advised he go to ED for evaluation.  Pt continues to report chest discomfort and generalized fatigue. Was due for dental appt on day of admission and had not taken asa for few days. 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 Received asa 325mg PO x1 , Brilinta 180mg PO x1 and heparin drip for ACS. Trop 143. Received insulin/dex 50% and mike gluconate IV and lokema  for K+ 5.9. He was taken to cath lab for ongoing chest discomfort.  Patient went urgently to cath lab for sten to SVG to OM1 with IABP placed.    24 hour events/subjective:    Pt. had IABP removed overnight. Pt. denies any acute complaints. UOP is robust.     PAST HISTORY  --------------------------------------------------------------------------------  No significant changes to PMH, PSH, FHx, SHx, unless otherwise noted    ALLERGIES & MEDICATIONS  --------------------------------------------------------------------------------  Allergies    carbamazepine (Other)  Cipro (Unknown)  fluoroquinolone antibiotics (Other)  Tegretol (Unknown)    Intolerances      Standing Inpatient Medications  aspirin enteric coated 81 milliGRAM(s) Oral daily  atorvastatin 80 milliGRAM(s) Oral at bedtime  carvedilol 6.25 milliGRAM(s) Oral every 12 hours  chlorhexidine 4% Liquid 1 Application(s) Topical <User Schedule>  dextrose 40% Gel 15 Gram(s) Oral once  dextrose 5%. 1000 milliLiter(s) IV Continuous <Continuous>  dextrose 5%. 1000 milliLiter(s) IV Continuous <Continuous>  dextrose 50% Injectable 25 Gram(s) IV Push once  dextrose 50% Injectable 12.5 Gram(s) IV Push once  dextrose 50% Injectable 25 Gram(s) IV Push once  furosemide    Tablet 20 milliGRAM(s) Oral daily  gabapentin 200 milliGRAM(s) Oral two times a day  glucagon  Injectable 1 milliGRAM(s) IntraMuscular once  heparin   Injectable 5000 Unit(s) SubCutaneous every 8 hours  hydrALAZINE 25 milliGRAM(s) Oral three times a day  insulin lispro (ADMELOG) corrective regimen sliding scale   SubCutaneous three times a day before meals  insulin lispro (ADMELOG) corrective regimen sliding scale   SubCutaneous at bedtime  isosorbide   dinitrate Tablet (ISORDIL) 10 milliGRAM(s) Oral three times a day  pantoprazole    Tablet 40 milliGRAM(s) Oral before breakfast  ticagrelor 90 milliGRAM(s) Oral every 12 hours    PRN Inpatient Medications      REVIEW OF SYSTEMS  --------------------------------------------------------------------------------  Gen: No fevers/chills  Skin: No rashes  Head/Eyes/Ears: Normal hearing,   Respiratory: No dyspnea, cough  CV: No chest pain  GI: No abdominal pain, diarrhea  : No dysuria, hematuria  MSK: No  edema  Heme: No easy bruising or bleeding  Psych: No significant depression      All other systems were reviewed and are negative, except as noted.    VITALS/PHYSICAL EXAM  --------------------------------------------------------------------------------  T(C): 37.2 (03-01-22 @ 07:33), Max: 37.2 (03-01-22 @ 07:33)  HR: 87 (03-01-22 @ 07:00) (65 - 87)  BP: 104/54 (03-01-22 @ 07:00) (101/51 - 145/57)  RR: 14 (03-01-22 @ 07:00) (8 - 21)  SpO2: 98% (03-01-22 @ 07:00) (92% - 100%)  Wt(kg): --        02-28-22 @ 07:01  -  03-01-22 @ 07:00  --------------------------------------------------------  IN: 100 mL / OUT: 2875 mL / NET: -2775 mL        Physical Exam:  	Gen: NAD  	HEENT: MMM  	Pulm: CTA B/L  	CV: S1S2  	Abd: Soft, +BS   	Ext: No LE edema B/L  	Neuro: Awake  	Skin: Warm and dry  	Vascular access: Peripheral      LABS/STUDIES  --------------------------------------------------------------------------------              11.7   9.92  >-----------<  145      [02-28-22 @ 00:26]              34.5     137  |  103  |  48  ----------------------------<  151      [02-28-22 @ 00:26]  4.2   |  22  |  2.32        Ca     8.4     [02-28-22 @ 00:26]      Mg     2.10     [02-28-22 @ 00:26]      Phos  4.1     [02-28-22 @ 00:26]    TPro  6.0  /  Alb  3.4  /  TBili  0.4  /  DBili  x   /  AST  24  /  ALT  12  /  AlkPhos  42  [02-28-22 @ 00:26]    PT/INR: PT 12.9 , INR 1.11       [02-28-22 @ 00:26]  PTT: 74.4       [02-28-22 @ 00:26]          [02-28-22 @ 20:14]    Creatinine Trend:  SCr 2.32 [02-28 @ 00:26]  SCr 2.15 [02-27 @ 02:24]  SCr 2.07 [02-26 @ 14:40]    Urinalysis - [02-28-22 @ 18:18]      Color Light Yellow / Appearance Clear / SG 1.021 / pH 6.0      Gluc >= 1000 mg/dL / Ketone Negative  / Bili Negative / Urobili <2 mg/dL       Blood Negative / Protein Trace / Leuk Est Negative / Nitrite Negative      RBC  / WBC  / Hyaline  / Gran  / Sq Epi  / Non Sq Epi  / Bacteria     Urine Creatinine 69      [02-28-22 @ 18:18]  Urine Sodium 52      [02-28-22 @ 18:18]  Urine Urea Nitrogen 687.0      [02-28-22 @ 18:18]    HbA1c 9.4      [05-24-19 @ 23:30]  TSH 1.71      [02-27-22 @ 02:26]  Lipid: chol 113, TG 46, HDL 51, LDL --      [02-27-22 @ 02:26]

## 2022-03-01 NOTE — PHYSICAL THERAPY INITIAL EVALUATION ADULT - ADDITIONAL COMMENTS
Patient lives with his wife, daughter and grandchildren in a house with 3/4 steps to enter, +Bilateral wide railings. Prior to admission, patient ambulated with a rollator independently. Pt owns a rolling walker and a rollator.     Pt. left comfortable in bedside chair with all tubes/lines intact, call bell in reach and in NAD. BP: 99/52

## 2022-03-01 NOTE — PROGRESS NOTE ADULT - ATTENDING COMMENTS
88 year old man with history of CAD sp CABG 20-25 years ago, multiple PCI thereafter most recently 2019 PCI SVG->OM; PPM, RLE PAD with stents in place. 2/26/22 had syncope and chest pain. 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 IABP removed yesterday; no events    TTE: 2/27/22  1. Mitral annular calcification, otherwise normal mitral  valve. Mild-moderate mitral regurgitation.  2. Aortic valve not well visualized; appears calcified.  Peak transaortic valve gradient equals 22 mm Hg, mean  transaortic valve gradient equals 11 mm Hg, consistent with  mild aortic stenosis.  3. Endocardial visualization enhanced with intravenous  injection of echo contrast (Definity). Mild segmental left  ventricular systolic dysfunction with hypokinesis of the  inferolateral wall. Visual estimate of EFabout 45%.  *** Compared with echocardiogram of 5/27/2019, no  significant changes noted.  ------------------------------------------------------------------------    Meds:  ASA 81 mg daily  Brilinta 90 mg BID  Coreg 6.25 mg BID  Hydralazine 25 mg TID  Isordil 10 mg TID  Atorvastatin 80 mg daily  SQ heparin    #Neuro- No active issues  #CV- STEMI with PCI to mSVG->OM and POBA proxSVG->OM  IABP pulled yesterday  Heparin on hold in anticipation of dc this afternoon  Continue DAPT with ASA/Brilinta/Statin  Continue Coreg  On Hydral/Isordil--can decrease to 10 mg TID of hydralazine  #Renal- Follow Cr; hold lasix  Continue to monitor  #Endo- Ha1c 8.8 now on sliding scale  #DVT ppx- sq heparin

## 2022-03-01 NOTE — PHYSICAL THERAPY INITIAL EVALUATION ADULT - PATIENT PROFILE REVIEW, REHAB EVAL
PT orders received, chart reviewed. ACTIVITY: increase as tolerated RN, Troy, endorsed pt to PT initial evaluation. Pt willing and able to participate in therapy session./yes

## 2022-03-01 NOTE — PROGRESS NOTE ADULT - PROBLEM SELECTOR PLAN 1
Pt with MABLE in the setting of NSTEMI now s/p stent placement and IABP( now removed 2/28). In January 2022 Pt. had SCr of 2.0 trending up from 1.7 in July 2021. Pt. admitted with Cr. of 2.0 now trending to 2.3 (2/28). No labs yet this AM. UA on 26 showing moderate blood but bland otherwise, please repeat with urine electrolytes. Suspect that Scr. will plateau within the next 24 hours and then improve original insult of NSTEMI, contrast, and IABP was the cause of MABLE. Echo reviewed. 1.5L UOP in last 24 hours. Consider holding Lasic today as BP on soft side and Pt with 2.8L UOP in last 24 hours. Monitor labs and urine output. Avoid NSAIDs, ACEI/ARBS, RCA and nephrotoxins. Dose medications as per eGFR.    If you have any questions, please feel free to contact me  Sai Bourgeois  Nephrology Fellow  [phone]; Prefer [employer] TEAMS  (After 5pm or on weekends please page the on-call fellow) Pt with MABLE in the setting of NSTEMI now s/p stent placement and IABP( now removed 2/28). In January 2022 Pt. had SCr of 2.0 trending up from 1.7 in July 2021. Pt. admitted with Cr. of 2.0 now trending to 2.3 (2/28). Scr improved to 1.9 (3/1). UA on 26 showing moderate blood but bland otherwise, please repeat with urine electrolytes. Suspect that Scr. will plateau within the next 24 hours and then improve original insult of NSTEMI, contrast, and IABP was the cause of MABLE. Echo reviewed. 1.5L UOP in last 24 hours. Consider holding Lasix today as BP on soft side and Pt with 2.8L UOP in last 24 hours. Monitor labs and urine output. Avoid NSAIDs, ACEI/ARBS, RCA and nephrotoxins. Dose medications as per eGFR.    If you have any questions, please feel free to contact me  Sai Bourgeois  Nephrology Fellow  [phone]; Prefer [employer] TEAMS  (After 5pm or on weekends please page the on-call fellow)

## 2022-03-01 NOTE — PROGRESS NOTE ADULT - SUBJECTIVE AND OBJECTIVE BOX
Patient is a 88y old  Male who presents with a chief complaint of Angina pectoralis (2022 14:12)    HPI:  This is an 88 year-old man with past medical history of CAD s/p CABG (), PCI stents x 5 (), PVD with stent place to Right Posterior Tibial Artery? (), T2DM, HTN, HLD, CKD, presented with near syncope and chest pain.  Patient's son states he has been feeling generalized fatigue for past few weeks but this morning nearly collapsed when standing and developed chest pressure.  Called his cardiologist, Dr. Corona, who advised he go to ED for evaluation.  Pt continues to report chest discomfort and generalized fatigue.  Was due for dental appt this morning and has not taken asa for few days. 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 Received asa 325mg PO x1 , Brilinta 180mg PO x1 and heparin drip for ACS. Trop 143. Received insulin/dex 50% and mike gluconate IV and lokema  for K+ 5.9. He was taken to cath lab for ongoing chest discomfort.  Patient went urgently to cath lab for sten to SVG to OM1 with IABP placed. (2022 15:39)       INTERVAL HPI/OVERNIGHT EVENTS:   No overnight events   Afebrile, hemodynamically stable     Subjective:    ICU Vital Signs Last 24 Hrs  T(C): 37 (01 Mar 2022 04:00), Max: 37 (01 Mar 2022 04:00)  T(F): 98.6 (01 Mar 2022 04:00), Max: 98.6 (01 Mar 2022 04:00)  HR: 87 (01 Mar 2022 07:00) (65 - 87)  BP: 104/54 (01 Mar 2022 07:00) (101/51 - 145/57)  BP(mean): 69 (01 Mar 2022 07:00) (65 - 88)  ABP: --  ABP(mean): --  RR: 14 (01 Mar 2022 07:00) (8 - 21)  SpO2: 98% (01 Mar 2022 07:00) (92% - 100%)    I&O's Summary    2022 07:01  -  01 Mar 2022 07:00  --------------------------------------------------------  IN: 100 mL / OUT: 2875 mL / NET: -2775 mL          Daily     Daily Weight in k.4 (01 Mar 2022 05:00)    Adult Advanced Hemodynamics Last 24 Hrs  CVP(mm Hg): --  CVP(cm H2O): --  CO: --  CI: --  PA: --  PA(mean): --  PCWP: --  SVR: --  SVRI: --  PVR: --  PVRI: --    EKG/Telemetry Events:    MEDICATIONS  (STANDING):  aspirin enteric coated 81 milliGRAM(s) Oral daily  atorvastatin 80 milliGRAM(s) Oral at bedtime  carvedilol 6.25 milliGRAM(s) Oral every 12 hours  chlorhexidine 4% Liquid 1 Application(s) Topical <User Schedule>  dextrose 40% Gel 15 Gram(s) Oral once  dextrose 5%. 1000 milliLiter(s) (50 mL/Hr) IV Continuous <Continuous>  dextrose 5%. 1000 milliLiter(s) (100 mL/Hr) IV Continuous <Continuous>  dextrose 50% Injectable 25 Gram(s) IV Push once  dextrose 50% Injectable 12.5 Gram(s) IV Push once  dextrose 50% Injectable 25 Gram(s) IV Push once  furosemide    Tablet 20 milliGRAM(s) Oral daily  gabapentin 200 milliGRAM(s) Oral two times a day  glucagon  Injectable 1 milliGRAM(s) IntraMuscular once  heparin   Injectable 5000 Unit(s) SubCutaneous every 8 hours  hydrALAZINE 25 milliGRAM(s) Oral three times a day  insulin lispro (ADMELOG) corrective regimen sliding scale   SubCutaneous three times a day before meals  insulin lispro (ADMELOG) corrective regimen sliding scale   SubCutaneous at bedtime  isosorbide   dinitrate Tablet (ISORDIL) 10 milliGRAM(s) Oral three times a day  pantoprazole    Tablet 40 milliGRAM(s) Oral before breakfast  ticagrelor 90 milliGRAM(s) Oral every 12 hours    MEDICATIONS  (PRN):      PHYSICAL EXAM:  GENERAL:   HEAD:  Atraumatic, Normocephalic  EYES: EOMI, PERRLA, conjunctiva and sclera clear  NECK: Supple, No JVD, Normal thyroid, no enlarged nodes  NERVOUS SYSTEM:  Alert & Awake.   CHEST/LUNG: B/L good air entry; No rales, rhonchi, or wheezing  HEART: S1S2 normal, no S3, Regular rate and rhythm; No murmurs  ABDOMEN: Soft, Nontender, Nondistended; Bowel sounds present  EXTREMITIES:  2+ Peripheral Pulses, No clubbing, cyanosis, or edema  LYMPH: No lymphadenopathy noted  SKIN: No rashes or lesions    LABS:                        11.7   9.92  )-----------( 145      ( 2022 00:26 )             34.5         137  |  103  |  48<H>  ----------------------------<  151<H>  4.2   |  22  |  2.32<H>    Ca    8.4      2022 00:26  Phos  4.1       Mg     2.10         TPro  6.0  /  Alb  3.4  /  TBili  0.4  /  DBili  x   /  AST  24  /  ALT  12  /  AlkPhos  42      LIVER FUNCTIONS - ( 2022 00:26 )  Alb: 3.4 g/dL / Pro: 6.0 g/dL / ALK PHOS: 42 U/L / ALT: 12 U/L / AST: 24 U/L / GGT: x           PT/INR - ( 2022 00:26 )   PT: 12.9 sec;   INR: 1.11 ratio         PTT - ( 2022 00:26 )  PTT:74.4 sec  CAPILLARY BLOOD GLUCOSE      POCT Blood Glucose.: 269 mg/dL (2022 21:22)  POCT Blood Glucose.: 139 mg/dL (2022 16:58)  POCT Blood Glucose.: 195 mg/dL (2022 11:47)  POCT Blood Glucose.: 115 mg/dL (2022 07:53)      Creatine Kinase, Serum: 230 U/L ( @ 20:14)  CKMB Units: 3.2 ng/mL ( @ 20:14)    CARDIAC MARKERS ( 2022 20:14 )  x     / x     / 230 U/L / x     / 3.2 ng/mL  CARDIAC MARKERS ( 2022 00:26 )  x     / x     / 300 U/L / x     / 5.0 ng/mL  CARDIAC MARKERS ( 2022 10:45 )  x     / x     / 279 U/L / x     / 7.4 ng/mL      Urinalysis Basic - ( 2022 18:18 )    Color: Light Yellow / Appearance: Clear / S.021 / pH: x  Gluc: x / Ketone: Negative  / Bili: Negative / Urobili: <2 mg/dL   Blood: x / Protein: Trace / Nitrite: Negative   Leuk Esterase: Negative / RBC: x / WBC x   Sq Epi: x / Non Sq Epi: x / Bacteria: x          RADIOLOGY & ADDITIONAL TESTS:  CXR:        Care Discussed with Consultants/Other Providers [ x] YES  [ ] NO           Patient is a 88y old  Male who presents with a chief complaint of Angina pectoralis (2022 14:12)    HPI:  This is an 88 year-old man with past medical history of CAD s/p CABG (), PCI stents x 5 (), PVD with stent place to Right Posterior Tibial Artery? (), T2DM, HTN, HLD, CKD, presented with near syncope and chest pain.  Patient's son states he has been feeling generalized fatigue for past few weeks but this morning nearly collapsed when standing and developed chest pressure.  Called his cardiologist, Dr. Corona, who advised he go to ED for evaluation.  Pt continues to report chest discomfort and generalized fatigue.  Was due for dental appt this morning and has not taken asa for few days. 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 Received asa 325mg PO x1 , Brilinta 180mg PO x1 and heparin drip for ACS. Trop 143. Received insulin/dex 50% and mike gluconate IV and lokema  for K+ 5.9. He was taken to cath lab for ongoing chest discomfort.  Patient went urgently to cath lab for sten to SVG to OM1 with IABP placed. (2022 15:39)       INTERVAL HPI/OVERNIGHT EVENTS:   Overnight: Received tramadol x1 overnight for shoulder pain.     Tele: No events on tele.     Subjective:  Pt complaining of persistent generalized weakness, states he was feeling weak for several weeks prior to admission. Reports family brought him food (bread and soup) from home. Denies CP, SOB.     ICU Vital Signs Last 24 Hrs  T(C): 37 (01 Mar 2022 04:00), Max: 37 (01 Mar 2022 04:00)  T(F): 98.6 (01 Mar 2022 04:00), Max: 98.6 (01 Mar 2022 04:00)  HR: 87 (01 Mar 2022 07:00) (65 - 87)  BP: 104/54 (01 Mar 2022 07:00) (101/51 - 145/57)  BP(mean): 69 (01 Mar 2022 07:00) (65 - 88)  ABP: --  ABP(mean): --  RR: 14 (01 Mar 2022 07:00) (8 - 21)  SpO2: 98% (01 Mar 2022 07:00) (92% - 100%)    I&O's Summary    2022 07:01  -  01 Mar 2022 07:00  --------------------------------------------------------  IN: 100 mL / OUT: 2875 mL / NET: -2775 mL        Daily     Daily Weight in k.4 (01 Mar 2022 05:00).  Weight  74.6 kg  weight on admission: 77.2 kg    Adult Advanced Hemodynamics Last 24 Hrs  CVP(mm Hg): --  CVP(cm H2O): --  CO: --  CI: --  PA: --  PA(mean): --  PCWP: --  SVR: --  SVRI: --  PVR: --  PVRI: --    EKG/Telemetry Events:    MEDICATIONS  (STANDING):  aspirin enteric coated 81 milliGRAM(s) Oral daily  atorvastatin 80 milliGRAM(s) Oral at bedtime  carvedilol 6.25 milliGRAM(s) Oral every 12 hours  chlorhexidine 4% Liquid 1 Application(s) Topical <User Schedule>  dextrose 40% Gel 15 Gram(s) Oral once  dextrose 5%. 1000 milliLiter(s) (50 mL/Hr) IV Continuous <Continuous>  dextrose 5%. 1000 milliLiter(s) (100 mL/Hr) IV Continuous <Continuous>  dextrose 50% Injectable 25 Gram(s) IV Push once  dextrose 50% Injectable 12.5 Gram(s) IV Push once  dextrose 50% Injectable 25 Gram(s) IV Push once  furosemide    Tablet 20 milliGRAM(s) Oral daily  gabapentin 200 milliGRAM(s) Oral two times a day  glucagon  Injectable 1 milliGRAM(s) IntraMuscular once  heparin   Injectable 5000 Unit(s) SubCutaneous every 8 hours  hydrALAZINE 25 milliGRAM(s) Oral three times a day  insulin lispro (ADMELOG) corrective regimen sliding scale   SubCutaneous three times a day before meals  insulin lispro (ADMELOG) corrective regimen sliding scale   SubCutaneous at bedtime  isosorbide   dinitrate Tablet (ISORDIL) 10 milliGRAM(s) Oral three times a day  pantoprazole    Tablet 40 milliGRAM(s) Oral before breakfast  ticagrelor 90 milliGRAM(s) Oral every 12 hours    MEDICATIONS  (PRN):      PHYSICAL EXAM:  GENERAL: NAD, frail  HEAD:  atraumatic, Normocephalic  EYES: EOMI, PERRLA, conjunctiva and sclera clear  NECK: Supple, No JVD,   CHEST/LUNG: B/L good air entry; No rales, rhonchi, or wheezing  HEART: S1S2 normal, no S3, Regular rate and rhythm; No murmurs  ABDOMEN: Soft, Nontender, Nondistended; Bowel sounds present  EXTREMITIES:  2+ Peripheral Pulses, No clubbing, cyanosis, or edema  NERVOUS SYSTEM:  Alert & Awake.   SKIN: No rashes or lesions    LABS:                        11.7   9.92  )-----------( 145      ( 2022 00:26 )             34.5         137  |  103  |  48<H>  ----------------------------<  151<H>  4.2   |  22  |  2.32<H>    Ca    8.4      2022 00:26  Phos  4.1       Mg     2.10         TPro  6.0  /  Alb  3.4  /  TBili  0.4  /  DBili  x   /  AST  24  /  ALT  12  /  AlkPhos  42  28    LIVER FUNCTIONS - ( 2022 00:26 )  Alb: 3.4 g/dL / Pro: 6.0 g/dL / ALK PHOS: 42 U/L / ALT: 12 U/L / AST: 24 U/L / GGT: x           PT/INR - ( 2022 00:26 )   PT: 12.9 sec;   INR: 1.11 ratio         PTT - ( 2022 00:26 )  PTT:74.4 sec  CAPILLARY BLOOD GLUCOSE      POCT Blood Glucose.: 269 mg/dL (2022 21:22)  POCT Blood Glucose.: 139 mg/dL (2022 16:58)  POCT Blood Glucose.: 195 mg/dL (2022 11:47)  POCT Blood Glucose.: 115 mg/dL (2022 07:53)      Creatine Kinase, Serum: 230 U/L ( @ 20:14)  CKMB Units: 3.2 ng/mL ( @ 20:14)    CARDIAC MARKERS ( 2022 20:14 )  x     / x     / 230 U/L / x     / 3.2 ng/mL  CARDIAC MARKERS ( 2022 00:26 )  x     / x     / 300 U/L / x     / 5.0 ng/mL  CARDIAC MARKERS ( 2022 10:45 )  x     / x     / 279 U/L / x     / 7.4 ng/mL      Urinalysis Basic - ( 2022 18:18 )    Color: Light Yellow / Appearance: Clear / S.021 / pH: x  Gluc: x / Ketone: Negative  / Bili: Negative / Urobili: <2 mg/dL   Blood: x / Protein: Trace / Nitrite: Negative   Leuk Esterase: Negative / RBC: x / WBC x   Sq Epi: x / Non Sq Epi: x / Bacteria: x          RADIOLOGY & ADDITIONAL TESTS:  CXR:        Care Discussed with Consultants/Other Providers [ x] YES  [ ] NO           Patient is a 88y old  Male who presents with a chief complaint of Angina pectoralis (2022 14:12)    HPI:  This is an 88 year-old man with past medical history of CAD s/p CABG (), PCI stents x 5 (), PVD with stent place to Right Posterior Tibial Artery? (), T2DM, HTN, HLD, CKD, presented with near syncope and chest pain.  Patient's son states he has been feeling generalized fatigue for past few weeks but this morning nearly collapsed when standing and developed chest pressure.  Called his cardiologist, Dr. Corona, who advised he go to ED for evaluation.  Pt continues to report chest discomfort and generalized fatigue.  Was due for dental appt this morning and has not taken asa for few days. 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 Received asa 325mg PO x1 , Brilinta 180mg PO x1 and heparin drip for ACS. Trop 143. Received insulin/dex 50% and mike gluconate IV and lokema  for K+ 5.9. He was taken to cath lab for ongoing chest discomfort.  Patient went urgently to cath lab for sten to SVG to OM1 with IABP placed. (2022 15:39)       INTERVAL HPI/OVERNIGHT EVENTS:   Overnight: Received tramadol x1 overnight for shoulder pain.     Tele: No events on tele.     Subjective:  Pt complaining of persistent generalized weakness, states he was feeling weak for several weeks prior to admission. Reports family brought him food (bread and soup) from home. Denies CP, SOB.     ICU Vital Signs Last 24 Hrs  T(C): 37 (01 Mar 2022 04:00), Max: 37 (01 Mar 2022 04:00)  T(F): 98.6 (01 Mar 2022 04:00), Max: 98.6 (01 Mar 2022 04:00)  HR: 87 (01 Mar 2022 07:00) (65 - 87)  BP: 104/54 (01 Mar 2022 07:00) (101/51 - 145/57)  BP(mean): 69 (01 Mar 2022 07:00) (65 - 88)  ABP: --  ABP(mean): --  RR: 14 (01 Mar 2022 07:00) (8 - 21)  SpO2: 98% (01 Mar 2022 07:00) (92% - 100%)    I&O's Summary    2022 07:01  -  01 Mar 2022 07:00  --------------------------------------------------------  IN: 100 mL / OUT: 2875 mL / NET: -2775 mL        Daily     Daily Weight in k.4 (01 Mar 2022 05:00).  Weight  74.6 kg  weight on admission: 77.2 kg    Adult Advanced Hemodynamics Last 24 Hrs  CVP(mm Hg): --  CVP(cm H2O): --  CO: --  CI: --  PA: --  PA(mean): --  PCWP: --  SVR: --  SVRI: --  PVR: --  PVRI: --    EKG/Telemetry Events: No events on tele    MEDICATIONS  (STANDING):  aspirin enteric coated 81 milliGRAM(s) Oral daily  atorvastatin 80 milliGRAM(s) Oral at bedtime  carvedilol 6.25 milliGRAM(s) Oral every 12 hours  chlorhexidine 4% Liquid 1 Application(s) Topical <User Schedule>  dextrose 40% Gel 15 Gram(s) Oral once  dextrose 5%. 1000 milliLiter(s) (50 mL/Hr) IV Continuous <Continuous>  dextrose 5%. 1000 milliLiter(s) (100 mL/Hr) IV Continuous <Continuous>  dextrose 50% Injectable 25 Gram(s) IV Push once  dextrose 50% Injectable 12.5 Gram(s) IV Push once  dextrose 50% Injectable 25 Gram(s) IV Push once  furosemide    Tablet 20 milliGRAM(s) Oral daily  gabapentin 200 milliGRAM(s) Oral two times a day  glucagon  Injectable 1 milliGRAM(s) IntraMuscular once  heparin   Injectable 5000 Unit(s) SubCutaneous every 8 hours  hydrALAZINE 25 milliGRAM(s) Oral three times a day  insulin lispro (ADMELOG) corrective regimen sliding scale   SubCutaneous three times a day before meals  insulin lispro (ADMELOG) corrective regimen sliding scale   SubCutaneous at bedtime  isosorbide   dinitrate Tablet (ISORDIL) 10 milliGRAM(s) Oral three times a day  pantoprazole    Tablet 40 milliGRAM(s) Oral before breakfast  ticagrelor 90 milliGRAM(s) Oral every 12 hours    MEDICATIONS  (PRN):      PHYSICAL EXAM:  GENERAL: NAD, frail  HEAD:  atraumatic, Normocephalic  EYES: EOMI, PERRLA, conjunctiva and sclera clear  NECK: Supple, No JVD,   CHEST/LUNG: B/L good air entry; No rales, rhonchi, or wheezing  HEART: S1S2 normal, no S3, Regular rate and rhythm; No murmurs  ABDOMEN: Soft, Nontender, Nondistended; Bowel sounds present  EXTREMITIES:  2+ Peripheral Pulses, No clubbing, cyanosis, or edema  NERVOUS SYSTEM:  Alert & Awake.   SKIN: No rashes or lesions    LABS:                        11.7   9.92  )-----------( 145      ( 2022 00:26 )             34.5         137  |  103  |  48<H>  ----------------------------<  151<H>  4.2   |  22  |  2.32<H>    Ca    8.4      2022 00:26  Phos  4.1       Mg     2.10         TPro  6.0  /  Alb  3.4  /  TBili  0.4  /  DBili  x   /  AST  24  /  ALT  12  /  AlkPhos  42  28    LIVER FUNCTIONS - ( 2022 00:26 )  Alb: 3.4 g/dL / Pro: 6.0 g/dL / ALK PHOS: 42 U/L / ALT: 12 U/L / AST: 24 U/L / GGT: x           PT/INR - ( 2022 00:26 )   PT: 12.9 sec;   INR: 1.11 ratio         PTT - ( 2022 00:26 )  PTT:74.4 sec  CAPILLARY BLOOD GLUCOSE      POCT Blood Glucose.: 269 mg/dL (2022 21:22)  POCT Blood Glucose.: 139 mg/dL (2022 16:58)  POCT Blood Glucose.: 195 mg/dL (2022 11:47)  POCT Blood Glucose.: 115 mg/dL (2022 07:53)      Creatine Kinase, Serum: 230 U/L ( @ 20:14)  CKMB Units: 3.2 ng/mL ( @ 20:14)    CARDIAC MARKERS ( 2022 20:14 )  x     / x     / 230 U/L / x     / 3.2 ng/mL  CARDIAC MARKERS ( 2022 00:26 )  x     / x     / 300 U/L / x     / 5.0 ng/mL  CARDIAC MARKERS ( 2022 10:45 )  x     / x     / 279 U/L / x     / 7.4 ng/mL      Urinalysis Basic - ( 2022 18:18 )    Color: Light Yellow / Appearance: Clear / S.021 / pH: x  Gluc: x / Ketone: Negative  / Bili: Negative / Urobili: <2 mg/dL   Blood: x / Protein: Trace / Nitrite: Negative   Leuk Esterase: Negative / RBC: x / WBC x   Sq Epi: x / Non Sq Epi: x / Bacteria: x          RADIOLOGY & ADDITIONAL TESTS:  CXR:        Care Discussed with Consultants/Other Providers [ x] YES  [ ] NO           Patient is a 88y old  Male who presents with a chief complaint of Angina pectoralis (2022 14:12)    HPI:  This is an 88 year-old man with past medical history of CAD s/p CABG (), PCI stents x 5 (), PVD with stent place to Right Posterior Tibial Artery? (), T2DM, HTN, HLD, CKD, presented with near syncope and chest pain.  Patient's son states he has been feeling generalized fatigue for past few weeks but this morning nearly collapsed when standing and developed chest pressure.  Called his cardiologist, Dr. Corona, who advised he go to ED for evaluation.  Pt continues to report chest discomfort and generalized fatigue.  Was due for dental appt this morning and has not taken asa for few days. 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 Received asa 325mg PO x1 , Brilinta 180mg PO x1 and heparin drip for ACS. Trop 143. Received insulin/dex 50% and mike gluconate IV and lokema  for K+ 5.9. He was taken to cath lab for ongoing chest discomfort.  Patient went urgently to cath lab for sten to SVG to OM1 with IABP placed. (2022 15:39)       INTERVAL HPI/OVERNIGHT EVENTS:   Overnight: Received tramadol x1 overnight for shoulder pain.     Tele: No events on tele.     Subjective:  Pt complaining of persistent generalized weakness, states he was feeling weak for several weeks prior to admission. Reports family brought him food (bread and soup) from home. Denies CP, SOB.     ICU Vital Signs Last 24 Hrs  T(C): 37 (01 Mar 2022 04:00), Max: 37 (01 Mar 2022 04:00)  T(F): 98.6 (01 Mar 2022 04:00), Max: 98.6 (01 Mar 2022 04:00)  HR: 87 (01 Mar 2022 07:00) (65 - 87)  BP: 104/54 (01 Mar 2022 07:00) (101/51 - 145/57)  BP(mean): 69 (01 Mar 2022 07:00) (65 - 88)  ABP: --  ABP(mean): --  RR: 14 (01 Mar 2022 07:00) (8 - 21)  SpO2: 98% (01 Mar 2022 07:00) (92% - 100%)    I&O's Summary    2022 07:01  -  01 Mar 2022 07:00  --------------------------------------------------------  IN: 100 mL / OUT: 2875 mL / NET: -2775 mL        Daily     Daily Weight in k.4 (01 Mar 2022 05:00).  Weight  74.6 kg  weight on admission: 77.2 kg    Adult Advanced Hemodynamics Last 24 Hrs  CVP(mm Hg): --  CVP(cm H2O): --  CO: --  CI: --  PA: --  PA(mean): --  PCWP: --  SVR: --  SVRI: --  PVR: --  PVRI: --    EKG/Telemetry Events: No events on tele    MEDICATIONS  (STANDING):  aspirin enteric coated 81 milliGRAM(s) Oral daily  atorvastatin 80 milliGRAM(s) Oral at bedtime  carvedilol 6.25 milliGRAM(s) Oral every 12 hours  chlorhexidine 4% Liquid 1 Application(s) Topical <User Schedule>  dextrose 40% Gel 15 Gram(s) Oral once  dextrose 5%. 1000 milliLiter(s) (50 mL/Hr) IV Continuous <Continuous>  dextrose 5%. 1000 milliLiter(s) (100 mL/Hr) IV Continuous <Continuous>  dextrose 50% Injectable 25 Gram(s) IV Push once  dextrose 50% Injectable 12.5 Gram(s) IV Push once  dextrose 50% Injectable 25 Gram(s) IV Push once  furosemide    Tablet 20 milliGRAM(s) Oral daily  gabapentin 200 milliGRAM(s) Oral two times a day  glucagon  Injectable 1 milliGRAM(s) IntraMuscular once  heparin   Injectable 5000 Unit(s) SubCutaneous every 8 hours  hydrALAZINE 25 milliGRAM(s) Oral three times a day  insulin lispro (ADMELOG) corrective regimen sliding scale   SubCutaneous three times a day before meals  insulin lispro (ADMELOG) corrective regimen sliding scale   SubCutaneous at bedtime  isosorbide   dinitrate Tablet (ISORDIL) 10 milliGRAM(s) Oral three times a day  pantoprazole    Tablet 40 milliGRAM(s) Oral before breakfast  ticagrelor 90 milliGRAM(s) Oral every 12 hours    MEDICATIONS  (PRN):      PHYSICAL EXAM:  GENERAL: NAD, frail  HEAD:  atraumatic, Normocephalic  EYES: EOMI, PERRLA, conjunctiva and sclera clear  NECK: Supple, No JVD,   CHEST/LUNG: B/L good air entry; No rales, rhonchi, or wheezing  HEART: S1S2 normal, no S3, Regular rate and rhythm; No murmurs  ABDOMEN: Soft, Nontender, Nondistended; Bowel sounds present  EXTREMITIES:  2+ Peripheral Pulses, No clubbing, cyanosis, or edema  NERVOUS SYSTEM:  Alert & Awake.   SKIN: No rashes or lesions    LABS:                          12.6   9.50  )-----------( 140      ( 01 Mar 2022 07:59 )             37.6       03-  137  |  103  |  39<H>  ----------------------------<  150<H>  4.1   |  21<L>  |  1.97<H>    Ca    8.7      01 Mar 2022 07:59  Phos  3.9     03-  Mg     2.30     03-    TPro  6.4  /  Alb  3.6  /  TBili  0.9  /  DBili  x   /  AST  19  /  ALT  13  /  AlkPhos  46  03-01            PT/INR - ( 2022 00:26 )   PT: 12.9 sec;   INR: 1.11 ratio         PTT - ( 2022 00:26 )  PTT:74.4 sec                            11.7   9.92  )-----------( 145      ( 2022 00:26 )             34.5     02-    137  |  103  |  48<H>  ----------------------------<  151<H>  4.2   |  22  |  2.32<H>    Ca    8.4      2022 00:26  Phos  4.1     -28  Mg     2.10     -    TPro  6.0  /  Alb  3.4  /  TBili  0.4  /  DBili  x   /  AST  24  /  ALT  12  /  AlkPhos  42      LIVER FUNCTIONS - ( 2022 00:26 )  Alb: 3.4 g/dL / Pro: 6.0 g/dL / ALK PHOS: 42 U/L / ALT: 12 U/L / AST: 24 U/L / GGT: x           PT/INR - ( 2022 00:26 )   PT: 12.9 sec;   INR: 1.11 ratio         PTT - ( 2022 00:26 )  PTT:74.4 sec  CAPILLARY BLOOD GLUCOSE      POCT Blood Glucose.: 269 mg/dL (2022 21:22)  POCT Blood Glucose.: 139 mg/dL (2022 16:58)  POCT Blood Glucose.: 195 mg/dL (2022 11:47)  POCT Blood Glucose.: 115 mg/dL (2022 07:53)      Creatine Kinase, Serum: 230 U/L ( @ 20:14)  CKMB Units: 3.2 ng/mL ( @ 20:14)    CARDIAC MARKERS ( 2022 20:14 )  x     / x     / 230 U/L / x     / 3.2 ng/mL  CARDIAC MARKERS ( 2022 00:26 )  x     / x     / 300 U/L / x     / 5.0 ng/mL  CARDIAC MARKERS ( 2022 10:45 )  x     / x     / 279 U/L / x     / 7.4 ng/mL      Urinalysis Basic - ( 2022 18:18 )    Color: Light Yellow / Appearance: Clear / S.021 / pH: x  Gluc: x / Ketone: Negative  / Bili: Negative / Urobili: <2 mg/dL   Blood: x / Protein: Trace / Nitrite: Negative   Leuk Esterase: Negative / RBC: x / WBC x   Sq Epi: x / Non Sq Epi: x / Bacteria: x          RADIOLOGY & ADDITIONAL TESTS:  CXR:        Care Discussed with Consultants/Other Providers [ x] YES  [ ] NO           Patient is a 88y old  Male who presents with a chief complaint of Angina pectoralis (2022 14:12)    HPI:  This is an 88 year-old man with past medical history of CAD s/p CABG (), PCI stents x 5 (), PVD with stent place to Right Posterior Tibial Artery? (), T2DM, HTN, HLD, CKD, presented with near syncope and chest pain.  Patient's son states he has been feeling generalized fatigue for past few weeks but this morning nearly collapsed when standing and developed chest pressure.  Called his cardiologist, Dr. Corona, who advised he go to ED for evaluation.  Pt continues to report chest discomfort and generalized fatigue.  Was due for dental appt this morning and has not taken asa for few days. 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 Received asa 325mg PO x1 , Brilinta 180mg PO x1 and heparin drip for ACS. Trop 143. Received insulin/dex 50% and mike gluconate IV and lokema  for K+ 5.9. He was taken to cath lab for ongoing chest discomfort.  Patient went urgently to cath lab for sten to SVG to OM1 with IABP placed. (2022 15:39)       INTERVAL HPI/OVERNIGHT EVENTS:   Overnight: Received tramadol x1 overnight for shoulder pain.     Tele: No events on tele.     Subjective:  Pt complaining of persistent generalized weakness, states he was feeling weak for several weeks prior to admission. Reports family brought him food (bread and soup) from home. Denies CP, SOB.     ICU Vital Signs Last 24 Hrs  T(C): 37 (01 Mar 2022 04:00), Max: 37 (01 Mar 2022 04:00)  T(F): 98.6 (01 Mar 2022 04:00), Max: 98.6 (01 Mar 2022 04:00)  HR: 87 (01 Mar 2022 07:00) (65 - 87)  BP: 104/54 (01 Mar 2022 07:00) (101/51 - 145/57)  BP(mean): 69 (01 Mar 2022 07:00) (65 - 88)  ABP: --  ABP(mean): --  RR: 14 (01 Mar 2022 07:00) (8 - 21)  SpO2: 98% (01 Mar 2022 07:00) (92% - 100%)    I&O's Summary    2022 07:01  -  01 Mar 2022 07:00  --------------------------------------------------------  IN: 100 mL / OUT: 2875 mL / NET: -2775 mL        Daily     Daily Weight in k.4 (01 Mar 2022 05:00).  Weight  74.6 kg  weight on admission: 77.2 kg    Adult Advanced Hemodynamics Last 24 Hrs  CVP(mm Hg): --  CVP(cm H2O): --  CO: --  CI: --  PA: --  PA(mean): --  PCWP: --  SVR: --  SVRI: --  PVR: --  PVRI: --    EKG/Telemetry Events: No events on tele    MEDICATIONS  (STANDING):  aspirin enteric coated 81 milliGRAM(s) Oral daily  atorvastatin 80 milliGRAM(s) Oral at bedtime  carvedilol 6.25 milliGRAM(s) Oral every 12 hours  chlorhexidine 4% Liquid 1 Application(s) Topical <User Schedule>  dextrose 40% Gel 15 Gram(s) Oral once  dextrose 5%. 1000 milliLiter(s) (50 mL/Hr) IV Continuous <Continuous>  dextrose 5%. 1000 milliLiter(s) (100 mL/Hr) IV Continuous <Continuous>  dextrose 50% Injectable 25 Gram(s) IV Push once  dextrose 50% Injectable 12.5 Gram(s) IV Push once  dextrose 50% Injectable 25 Gram(s) IV Push once  furosemide    Tablet 20 milliGRAM(s) Oral daily  gabapentin 200 milliGRAM(s) Oral two times a day  glucagon  Injectable 1 milliGRAM(s) IntraMuscular once  heparin   Injectable 5000 Unit(s) SubCutaneous every 8 hours  hydrALAZINE 25 milliGRAM(s) Oral three times a day  insulin lispro (ADMELOG) corrective regimen sliding scale   SubCutaneous three times a day before meals  insulin lispro (ADMELOG) corrective regimen sliding scale   SubCutaneous at bedtime  isosorbide   dinitrate Tablet (ISORDIL) 10 milliGRAM(s) Oral three times a day  pantoprazole    Tablet 40 milliGRAM(s) Oral before breakfast  ticagrelor 90 milliGRAM(s) Oral every 12 hours    MEDICATIONS  (PRN):      PHYSICAL EXAM:  GENERAL: NAD, frail  HEAD:  atraumatic, Normocephalic  EYES: EOMI, PERRLA, conjunctiva and sclera clear  NECK: Supple, No JVD  CHEST/LUNG: B/L good air entry; No rales, rhonchi, or wheezing  HEART: S1S2 normal, no S3, Regular rate and rhythm; No murmurs  ABDOMEN: Soft, Nontender, Nondistended; Bowel sounds present  EXTREMITIES: L femoral access site without blood, no hematoma, LLE pulses 2+, No LE edema  NERVOUS SYSTEM:  Alert & Awake.   SKIN: No rashes or lesions    LABS:                          12.6   9.50  )-----------( 140      ( 01 Mar 2022 07:59 )             37.6       03-01  137  |  103  |  39<H>  ----------------------------<  150<H>  4.1   |  21<L>  |  1.97<H>    Ca    8.7      01 Mar 2022 07:59  Phos  3.9     03-01  Mg     2.30     03-    TPro  6.4  /  Alb  3.6  /  TBili  0.9  /  DBili  x   /  AST  19  /  ALT  13  /  AlkPhos  46  03-01            PT/INR - ( 2022 00:26 )   PT: 12.9 sec;   INR: 1.11 ratio         PTT - ( 2022 00:26 )  PTT:74.4 sec                            11.7   9.92  )-----------( 145      ( 2022 00:26 )             34.5     02-    137  |  103  |  48<H>  ----------------------------<  151<H>  4.2   |  22  |  2.32<H>    Ca    8.4      2022 00:26  Phos  4.1     -28  Mg     2.10     -    TPro  6.0  /  Alb  3.4  /  TBili  0.4  /  DBili  x   /  AST  24  /  ALT  12  /  AlkPhos  42      LIVER FUNCTIONS - ( 2022 00:26 )  Alb: 3.4 g/dL / Pro: 6.0 g/dL / ALK PHOS: 42 U/L / ALT: 12 U/L / AST: 24 U/L / GGT: x           PT/INR - ( 2022 00:26 )   PT: 12.9 sec;   INR: 1.11 ratio         PTT - ( 2022 00:26 )  PTT:74.4 sec  CAPILLARY BLOOD GLUCOSE      POCT Blood Glucose.: 269 mg/dL (2022 21:22)  POCT Blood Glucose.: 139 mg/dL (2022 16:58)  POCT Blood Glucose.: 195 mg/dL (2022 11:47)  POCT Blood Glucose.: 115 mg/dL (2022 07:53)      Creatine Kinase, Serum: 230 U/L ( @ 20:14)  CKMB Units: 3.2 ng/mL ( @ 20:14)    CARDIAC MARKERS ( 2022 20:14 )  x     / x     / 230 U/L / x     / 3.2 ng/mL  CARDIAC MARKERS ( 2022 00:26 )  x     / x     / 300 U/L / x     / 5.0 ng/mL  CARDIAC MARKERS ( 2022 10:45 )  x     / x     / 279 U/L / x     / 7.4 ng/mL      Urinalysis Basic - ( 2022 18:18 )    Color: Light Yellow / Appearance: Clear / S.021 / pH: x  Gluc: x / Ketone: Negative  / Bili: Negative / Urobili: <2 mg/dL   Blood: x / Protein: Trace / Nitrite: Negative   Leuk Esterase: Negative / RBC: x / WBC x   Sq Epi: x / Non Sq Epi: x / Bacteria: x          RADIOLOGY & ADDITIONAL TESTS:  CXR:        Care Discussed with Consultants/Other Providers [ x] YES  [ ] NO

## 2022-03-01 NOTE — PHYSICAL THERAPY INITIAL EVALUATION ADULT - DISCHARGE DISPOSITION, PT EVAL
Anticipated discharge to rehab facility to address current functional limitation to optimize safety to allow pt. to reach their optimal level of function./rehabilitation facility

## 2022-03-02 DIAGNOSIS — G93.40 ENCEPHALOPATHY, UNSPECIFIED: ICD-10-CM

## 2022-03-02 DIAGNOSIS — E11.9 TYPE 2 DIABETES MELLITUS WITHOUT COMPLICATIONS: ICD-10-CM

## 2022-03-02 DIAGNOSIS — I10 ESSENTIAL (PRIMARY) HYPERTENSION: ICD-10-CM

## 2022-03-02 DIAGNOSIS — I21.4 NON-ST ELEVATION (NSTEMI) MYOCARDIAL INFARCTION: ICD-10-CM

## 2022-03-02 DIAGNOSIS — N28.9 DISORDER OF KIDNEY AND URETER, UNSPECIFIED: ICD-10-CM

## 2022-03-02 DIAGNOSIS — E78.5 HYPERLIPIDEMIA, UNSPECIFIED: ICD-10-CM

## 2022-03-02 DIAGNOSIS — I50.21 ACUTE SYSTOLIC (CONGESTIVE) HEART FAILURE: ICD-10-CM

## 2022-03-02 LAB
ANION GAP SERPL CALC-SCNC: 15 MMOL/L — HIGH (ref 7–14)
APPEARANCE UR: CLEAR — SIGNIFICANT CHANGE UP
BILIRUB UR-MCNC: NEGATIVE — SIGNIFICANT CHANGE UP
BUN SERPL-MCNC: 46 MG/DL — HIGH (ref 7–23)
CALCIUM SERPL-MCNC: 8.3 MG/DL — LOW (ref 8.4–10.5)
CHLORIDE SERPL-SCNC: 102 MMOL/L — SIGNIFICANT CHANGE UP (ref 98–107)
CO2 SERPL-SCNC: 18 MMOL/L — LOW (ref 22–31)
COLOR SPEC: SIGNIFICANT CHANGE UP
CREAT SERPL-MCNC: 2.22 MG/DL — HIGH (ref 0.5–1.3)
DIFF PNL FLD: NEGATIVE — SIGNIFICANT CHANGE UP
EGFR: 28 ML/MIN/1.73M2 — LOW
GLUCOSE BLDC GLUCOMTR-MCNC: 449 MG/DL — HIGH (ref 70–99)
GLUCOSE SERPL-MCNC: 355 MG/DL — HIGH (ref 70–99)
GLUCOSE UR QL: ABNORMAL
HCT VFR BLD CALC: 33.6 % — LOW (ref 39–50)
HGB BLD-MCNC: 11.4 G/DL — LOW (ref 13–17)
KETONES UR-MCNC: NEGATIVE — SIGNIFICANT CHANGE UP
LEUKOCYTE ESTERASE UR-ACNC: NEGATIVE — SIGNIFICANT CHANGE UP
MAGNESIUM SERPL-MCNC: 2.1 MG/DL — SIGNIFICANT CHANGE UP (ref 1.6–2.6)
MCHC RBC-ENTMCNC: 30.7 PG — SIGNIFICANT CHANGE UP (ref 27–34)
MCHC RBC-ENTMCNC: 33.9 GM/DL — SIGNIFICANT CHANGE UP (ref 32–36)
MCV RBC AUTO: 90.6 FL — SIGNIFICANT CHANGE UP (ref 80–100)
NITRITE UR-MCNC: NEGATIVE — SIGNIFICANT CHANGE UP
NRBC # BLD: 0 /100 WBCS — SIGNIFICANT CHANGE UP
NRBC # FLD: 0 K/UL — SIGNIFICANT CHANGE UP
PH UR: 6 — SIGNIFICANT CHANGE UP (ref 5–8)
PHOSPHATE SERPL-MCNC: 3.7 MG/DL — SIGNIFICANT CHANGE UP (ref 2.5–4.5)
PLATELET # BLD AUTO: 133 K/UL — LOW (ref 150–400)
POTASSIUM SERPL-MCNC: 4.3 MMOL/L — SIGNIFICANT CHANGE UP (ref 3.5–5.3)
POTASSIUM SERPL-SCNC: 4.3 MMOL/L — SIGNIFICANT CHANGE UP (ref 3.5–5.3)
PROT UR-MCNC: NEGATIVE — SIGNIFICANT CHANGE UP
RBC # BLD: 3.71 M/UL — LOW (ref 4.2–5.8)
RBC # FLD: 14.6 % — HIGH (ref 10.3–14.5)
SODIUM SERPL-SCNC: 135 MMOL/L — SIGNIFICANT CHANGE UP (ref 135–145)
SP GR SPEC: 1.02 — SIGNIFICANT CHANGE UP (ref 1–1.05)
UROBILINOGEN FLD QL: SIGNIFICANT CHANGE UP
WBC # BLD: 8.71 K/UL — SIGNIFICANT CHANGE UP (ref 3.8–10.5)
WBC # FLD AUTO: 8.71 K/UL — SIGNIFICANT CHANGE UP (ref 3.8–10.5)

## 2022-03-02 PROCEDURE — 76770 US EXAM ABDO BACK WALL COMP: CPT | Mod: 26

## 2022-03-02 PROCEDURE — 99233 SBSQ HOSP IP/OBS HIGH 50: CPT

## 2022-03-02 PROCEDURE — 99223 1ST HOSP IP/OBS HIGH 75: CPT | Mod: GC

## 2022-03-02 PROCEDURE — 99232 SBSQ HOSP IP/OBS MODERATE 35: CPT

## 2022-03-02 PROCEDURE — 99232 SBSQ HOSP IP/OBS MODERATE 35: CPT | Mod: GC

## 2022-03-02 RX ORDER — INSULIN LISPRO 100/ML
4 VIAL (ML) SUBCUTANEOUS
Refills: 0 | Status: DISCONTINUED | OUTPATIENT
Start: 2022-03-02 | End: 2022-03-03

## 2022-03-02 RX ORDER — INSULIN GLARGINE 100 [IU]/ML
12 INJECTION, SOLUTION SUBCUTANEOUS AT BEDTIME
Refills: 0 | Status: DISCONTINUED | OUTPATIENT
Start: 2022-03-02 | End: 2022-03-03

## 2022-03-02 RX ORDER — FUROSEMIDE 40 MG
20 TABLET ORAL DAILY
Refills: 0 | Status: DISCONTINUED | OUTPATIENT
Start: 2022-03-02 | End: 2022-03-05

## 2022-03-02 RX ORDER — INSULIN LISPRO 100/ML
3 VIAL (ML) SUBCUTANEOUS ONCE
Refills: 0 | Status: COMPLETED | OUTPATIENT
Start: 2022-03-02 | End: 2022-03-02

## 2022-03-02 RX ORDER — ALPRAZOLAM 0.25 MG
0.25 TABLET ORAL ONCE
Refills: 0 | Status: DISCONTINUED | OUTPATIENT
Start: 2022-03-02 | End: 2022-03-02

## 2022-03-02 RX ORDER — LANOLIN ALCOHOL/MO/W.PET/CERES
3 CREAM (GRAM) TOPICAL AT BEDTIME
Refills: 0 | Status: DISCONTINUED | OUTPATIENT
Start: 2022-03-02 | End: 2022-03-05

## 2022-03-02 RX ADMIN — CARVEDILOL PHOSPHATE 6.25 MILLIGRAM(S): 80 CAPSULE, EXTENDED RELEASE ORAL at 17:26

## 2022-03-02 RX ADMIN — HEPARIN SODIUM 5000 UNIT(S): 5000 INJECTION INTRAVENOUS; SUBCUTANEOUS at 22:17

## 2022-03-02 RX ADMIN — HEPARIN SODIUM 5000 UNIT(S): 5000 INJECTION INTRAVENOUS; SUBCUTANEOUS at 13:23

## 2022-03-02 RX ADMIN — Medication 0: at 22:18

## 2022-03-02 RX ADMIN — Medication 3 MILLIGRAM(S): at 22:19

## 2022-03-02 RX ADMIN — HEPARIN SODIUM 5000 UNIT(S): 5000 INJECTION INTRAVENOUS; SUBCUTANEOUS at 05:53

## 2022-03-02 RX ADMIN — GABAPENTIN 200 MILLIGRAM(S): 400 CAPSULE ORAL at 05:52

## 2022-03-02 RX ADMIN — Medication 81 MILLIGRAM(S): at 12:16

## 2022-03-02 RX ADMIN — Medication 10 MILLIGRAM(S): at 05:52

## 2022-03-02 RX ADMIN — Medication 6: at 08:59

## 2022-03-02 RX ADMIN — GABAPENTIN 200 MILLIGRAM(S): 400 CAPSULE ORAL at 17:26

## 2022-03-02 RX ADMIN — TICAGRELOR 90 MILLIGRAM(S): 90 TABLET ORAL at 17:26

## 2022-03-02 RX ADMIN — Medication 10 MILLIGRAM(S): at 22:17

## 2022-03-02 RX ADMIN — SENNA PLUS 2 TABLET(S): 8.6 TABLET ORAL at 22:28

## 2022-03-02 RX ADMIN — ATORVASTATIN CALCIUM 80 MILLIGRAM(S): 80 TABLET, FILM COATED ORAL at 22:28

## 2022-03-02 RX ADMIN — ISOSORBIDE DINITRATE 10 MILLIGRAM(S): 5 TABLET ORAL at 22:19

## 2022-03-02 RX ADMIN — Medication 0.25 MILLIGRAM(S): at 12:16

## 2022-03-02 RX ADMIN — Medication 3: at 12:24

## 2022-03-02 RX ADMIN — Medication 10 MILLIGRAM(S): at 13:23

## 2022-03-02 RX ADMIN — INSULIN GLARGINE 10 UNIT(S): 100 INJECTION, SOLUTION SUBCUTANEOUS at 00:30

## 2022-03-02 RX ADMIN — Medication 3 UNIT(S): at 08:59

## 2022-03-02 RX ADMIN — PANTOPRAZOLE SODIUM 40 MILLIGRAM(S): 20 TABLET, DELAYED RELEASE ORAL at 07:23

## 2022-03-02 RX ADMIN — INSULIN GLARGINE 12 UNIT(S): 100 INJECTION, SOLUTION SUBCUTANEOUS at 22:28

## 2022-03-02 RX ADMIN — Medication 3 UNIT(S): at 10:25

## 2022-03-02 RX ADMIN — TICAGRELOR 90 MILLIGRAM(S): 90 TABLET ORAL at 05:51

## 2022-03-02 RX ADMIN — Medication 20 MILLIGRAM(S): at 13:22

## 2022-03-02 RX ADMIN — CARVEDILOL PHOSPHATE 6.25 MILLIGRAM(S): 80 CAPSULE, EXTENDED RELEASE ORAL at 05:52

## 2022-03-02 RX ADMIN — Medication 2: at 17:50

## 2022-03-02 RX ADMIN — ISOSORBIDE DINITRATE 10 MILLIGRAM(S): 5 TABLET ORAL at 05:53

## 2022-03-02 RX ADMIN — Medication 4 UNIT(S): at 12:25

## 2022-03-02 RX ADMIN — ISOSORBIDE DINITRATE 10 MILLIGRAM(S): 5 TABLET ORAL at 13:23

## 2022-03-02 RX ADMIN — Medication 4 UNIT(S): at 17:49

## 2022-03-02 RX ADMIN — POLYETHYLENE GLYCOL 3350 17 GRAM(S): 17 POWDER, FOR SOLUTION ORAL at 12:18

## 2022-03-02 NOTE — PROGRESS NOTE ADULT - ASSESSMENT
88M PMH CAD/MI (CABG + Stents X 5), PAD s/p Right LE Stent, HTN, HLD, DMT2, CKD, CVA (no residual), presented with near syncope and Chest pain, NSTEMI with STD in I, aVL,V2 to V6 s/p urgent PCI and  GEMMA to 95%  mSVG to OM1 and POBA to pSVG-OM1. Now s/p IABP removal on 2/28. Course c/b MABLE       NSTEMI   - (+trops, +EKG changes) ,+ trop | chest discomfort  - loaded with 325 mg ASA, 180mg Ticagrelor/nitroglycerin 0.4mg  - s/p LHC with GEMMA to mSVG to OM1 and POBA to pSVG-OM1  - Cont w.  ASA 81, Ticagrelor 90mg BID, lipitor 80mg ,coreg 6.25mg  - cath showed EF 35%m, LVEDP 27,  - develop sob during procedure found to have crackles /wheezing  in cath lab, lasix 40mg IVP and solumedrol and Duoneb with good response  - s/p IABP   - continue isordil 10mg TID and hydralazine 10 mg TID  - DASH/CC Diet    #Hx CAD s/p 5 stents w. last stent placed in 5/2019  - c/w DAPT ASA and Brilinta, coreg 6.25mg Lipitor  80mg    - holding ACE-I due to MABEL  - RICHARD  in 2019 indicated EF 45-50% w. hypokinesis of inferolateral wall  - Echo 2/28 indicated EF of 45% with Mild segmental left ventricular systolic dysfunction with hypokinesis of the inferolateral wall      systolic CHF  - euvolemic on exam  - POCUS in cath lab showed EF 35%   - Crackles/wheezing during angioplasty s/p lasix 40mg IV / solumedrol   - hold lasix 20mg PO daily per nephro as BP on soft side and pt with 2.8L UOP in last 24 hours    RENAL:  #MABLE superimposed on CKD  stage 3  - in setting of NSTEMI with contributing BERENICE, s/p left heart cath on 2/26  - SCr 2.2 today  - Cr 2.0 on admission, 1.7 in July 2021  -SCreat on admission 2. SCr 2.0 in January 2022  -Strict I+O- UOP 2.8 L in last 24 hours  - hold lasix 20mg PO daily per nephro as BP on soft side and pt with 2.8L UOP in last 24 hours  -Check Electrolytes and replenish to maintain K >4 and Mag >2  -Avoid nephrotoxic drugs  -nephrology consulted    Dispo:  - PT rehab

## 2022-03-02 NOTE — PROGRESS NOTE ADULT - ASSESSMENT
88M CAD/MI s/p CABG/stents, PAD s/p RLE stent, HTN, DM type 2 A1c 8.8, CKD stage 3, CVA - no residual symptoms, p/w NSTEMI s/p GEMMA w/ IABP in CCU c/b acute chronic systolic CHF, MABLE, hyperglycemia, acute encephalopathy after dose of xanax.

## 2022-03-02 NOTE — PROGRESS NOTE ADULT - SUBJECTIVE AND OBJECTIVE BOX
[facility] Division of Hospital Medicine  Yovani Garduno MD  Pager (M-F, 0F-5P): [phone]  Other Times:  [phone]    Patient is a 88y old  Male who presents with a chief complaint of Angina pectoralis (02 Mar 2022 08:51)    SUBJECTIVE / OVERNIGHT EVENTS:  Patient had extreme anxiety - given xanax but c/b acute encephalopathy.  No F/C, N/V, CP, SOB, Cough, lightheadedness, dizziness, abdominal pain, diarrhea, dysuria.    MEDICATIONS  (STANDING):  aspirin enteric coated 81 milliGRAM(s) Oral daily  atorvastatin 80 milliGRAM(s) Oral at bedtime  carvedilol 6.25 milliGRAM(s) Oral every 12 hours  chlorhexidine 4% Liquid 1 Application(s) Topical <User Schedule>  dextrose 40% Gel 15 Gram(s) Oral once  dextrose 5%. 1000 milliLiter(s) (50 mL/Hr) IV Continuous <Continuous>  dextrose 5%. 1000 milliLiter(s) (100 mL/Hr) IV Continuous <Continuous>  dextrose 50% Injectable 25 Gram(s) IV Push once  dextrose 50% Injectable 12.5 Gram(s) IV Push once  dextrose 50% Injectable 25 Gram(s) IV Push once  furosemide    Tablet 20 milliGRAM(s) Oral daily  gabapentin 200 milliGRAM(s) Oral two times a day  glucagon  Injectable 1 milliGRAM(s) IntraMuscular once  heparin   Injectable 5000 Unit(s) SubCutaneous every 8 hours  hydrALAZINE 10 milliGRAM(s) Oral three times a day  insulin glargine Injectable (LANTUS) 12 Unit(s) SubCutaneous at bedtime  insulin lispro (ADMELOG) corrective regimen sliding scale   SubCutaneous three times a day before meals  insulin lispro (ADMELOG) corrective regimen sliding scale   SubCutaneous at bedtime  insulin lispro Injectable (ADMELOG) 4 Unit(s) SubCutaneous three times a day before meals  isosorbide   dinitrate Tablet (ISORDIL) 10 milliGRAM(s) Oral three times a day  pantoprazole    Tablet 40 milliGRAM(s) Oral before breakfast  polyethylene glycol 3350 17 Gram(s) Oral daily  senna 2 Tablet(s) Oral at bedtime  ticagrelor 90 milliGRAM(s) Oral every 12 hours    MEDICATIONS  (PRN):      Vital Signs Last 24 Hrs  T(C): 36.7 (02 Mar 2022 12:38), Max: 36.7 (01 Mar 2022 16:00)  T(F): 98.1 (02 Mar 2022 12:38), Max: 98.1 (01 Mar 2022 16:00)  HR: 80 (02 Mar 2022 12:38) (70 - 91)  BP: 129/57 (02 Mar 2022 12:38) (102/55 - 136/66)  BP(mean): 71 (01 Mar 2022 17:00) (69 - 76)  RR: 16 (02 Mar 2022 12:38) (14 - 18)  SpO2: 99% (02 Mar 2022 12:38) (96% - 100%)  CAPILLARY BLOOD GLUCOSE      POCT Blood Glucose.: 297 mg/dL (02 Mar 2022 12:21)  POCT Blood Glucose.: 449 mg/dL (02 Mar 2022 08:42)  POCT Blood Glucose.: 188 mg/dL (01 Mar 2022 23:37)  POCT Blood Glucose.: 213 mg/dL (01 Mar 2022 21:24)  POCT Blood Glucose.: 163 mg/dL (01 Mar 2022 17:32)  POCT Blood Glucose.: 214 mg/dL (01 Mar 2022 16:11)    I&O's Summary    01 Mar 2022 07:01  -  02 Mar 2022 07:00  --------------------------------------------------------  IN: 500 mL / OUT: 1500 mL / NET: -1000 mL    02 Mar 2022 07:01  -  02 Mar 2022 14:56  --------------------------------------------------------  IN: 0 mL / OUT: 250 mL / NET: -250 mL        PHYSICAL EXAM:  CONSTITUTIONAL: NAD, overweight  EYES: PERRLA; conjunctiva and sclera clear  ENMT: Moist oral mucosa, no pharyngeal injection or exudates; normal dentition  NECK: Supple, no palpable masses; no thyromegaly  RESPIRATORY: Normal respiratory effort; lungs are clear to auscultation bilaterally  CARDIOVASCULAR: Regular rate and rhythm, normal S1 and S2, no murmur/rub/gallop; No lower extremity edema; Peripheral pulses are 2+ bilaterally  ABDOMEN: Nontender to palpation, normoactive bowel sounds, no rebound/guarding; No hepatosplenomegaly  MUSCULOSKELETAL:  Did not assess gait; no clubbing or cyanosis of digits; no joint swelling or tenderness to palpation  PSYCH: A+O to person, place, and time; affect appropriate  NEUROLOGY: CN 2-12 are intact and symmetric; no gross sensory deficits   SKIN: No rashes; no palpable lesions    LABS:                        11.4   8.71  )-----------( 133      ( 02 Mar 2022 06:57 )             33.6     03-02    135  |  102  |  46<H>  ----------------------------<  355<H>  4.3   |  18<L>  |  2.22<H>    Ca    8.3<L>      02 Mar 2022 06:57  Phos  3.7     03-02  Mg     2.10     03-02    TPro  6.4  /  Alb  3.6  /  TBili  0.9  /  DBili  x   /  AST  19  /  ALT  13  /  AlkPhos  46  03-01      CARDIAC MARKERS ( 01 Mar 2022 07:59 )  x     / x     / 171 U/L / x     / x      CARDIAC MARKERS ( 2022 20:14 )  x     / x     / 230 U/L / x     / 3.2 ng/mL      Urinalysis Basic - ( 02 Mar 2022 11:17 )    Color: Light Yellow / Appearance: Clear / S.021 / pH: x  Gluc: x / Ketone: Negative  / Bili: Negative / Urobili: <2 mg/dL   Blood: x / Protein: Negative / Nitrite: Negative   Leuk Esterase: Negative / RBC: x / WBC x   Sq Epi: x / Non Sq Epi: x / Bacteria: x        RADIOLOGY & ADDITIONAL TESTS:    Imaging Personally Reviewed:    Care Discussed with Consultants/Other Providers:

## 2022-03-02 NOTE — PROGRESS NOTE ADULT - SUBJECTIVE AND OBJECTIVE BOX
Phillip Flower NP  CCU Service  Cardiology   Spect: [id number] ([facility])     PATIENT: SHAIK CHARANJIT, MRN: [id number]    CHIEF COMPLAINT: Patient is a 88y old  Male who presents with a chief complaint of Angina pectoralis (02 Mar 2022 08:38)      INTERVAL HISTORY/OVERNIGHT EVENTS: No overnight events. Denies abdominal pain, chest pain or SOB, cough. Has been ambulating with assistance. Oriented to person, place, and time. Breathing comfortably on room air.    REVIEW OF SYSTEMS:    Constitutional:     [ ] negative [ ] fevers [ ] chills [ ] weight loss [ ] weight gain  HEENT:                  [ ] negative [ ] dry eyes [ ] eye irritation [ ] postnasal drip [ ] nasal congestion  CV:                         [ ] negative  [ ] chest pain [ ] orthopnea [ ] palpitations [ ] murmur  Resp:                     [ ] negative [ ] cough [ ] shortness of breath [ ] dyspnea [ ] wheezing [ ] sputum [ ] hemoptysis  GI:                          [ ] negative [ ] nausea [ ] vomiting [ ] diarrhea [ ] constipation [ ] abd pain [ ] dysphagia   :                        [ ] negative [ ] dysuria [ ] nocturia [ ] hematuria [ ] increased urinary frequency  Musculoskeletal: [ ] negative [ ] back pain [ ] myalgias [ ] arthralgias [ ] fracture  Skin:                       [ ] negative [ ] rash [ ] itch  Neurological:        [ ] negative [ ] headache [ ] dizziness [ ] syncope [ ] weakness [ ] numbness  Psychiatric:           [ ] negative [ ] anxiety [ ] depression  Endocrine:            [ ] negative [x ] diabetes [ ] thyroid problem  Heme/Lymph:      [ ] negative [ ] anemia [ ] bleeding problem  Allergic/Immune: [ ] negative [ ] itchy eyes [ ] nasal discharge [ ] hives [ ] angioedema    [x ] All other systems negative  [ ] Unable to assess ROS because ________.    MEDICATIONS:  MEDICATIONS  (STANDING):  aspirin enteric coated 81 milliGRAM(s) Oral daily  atorvastatin 80 milliGRAM(s) Oral at bedtime  carvedilol 6.25 milliGRAM(s) Oral every 12 hours  gabapentin 200 milliGRAM(s) Oral two times a day  glucagon  Injectable 1 milliGRAM(s) IntraMuscular once  heparin   Injectable 5000 Unit(s) SubCutaneous every 8 hours  hydrALAZINE 10 milliGRAM(s) Oral three times a day  insulin glargine Injectable (LANTUS) 10 Unit(s) SubCutaneous at bedtime  insulin lispro (ADMELOG) corrective regimen sliding scale   SubCutaneous three times a day before meals  insulin lispro (ADMELOG) corrective regimen sliding scale   SubCutaneous at bedtime  insulin lispro Injectable (ADMELOG) 3 Unit(s) SubCutaneous three times a day before meals  isosorbide   dinitrate Tablet (ISORDIL) 10 milliGRAM(s) Oral three times a day  pantoprazole    Tablet 40 milliGRAM(s) Oral before breakfast  polyethylene glycol 3350 17 Gram(s) Oral daily  senna 2 Tablet(s) Oral at bedtime  ticagrelor 90 milliGRAM(s) Oral every 12 hours    MEDICATIONS  (PRN):      ALLERGIES: Allergies    carbamazepine (Other)  Cipro (Unknown)  fluoroquinolone antibiotics (Other)  Tegretol (Unknown)    Intolerances        OBJECTIVE:  ICU Vital Signs Last 24 Hrs  T(C): 36.5 (02 Mar 2022 05:49), Max: 36.7 (01 Mar 2022 16:00)  T(F): 97.7 (02 Mar 2022 05:49), Max: 98.1 (01 Mar 2022 16:00)  HR: 88 (02 Mar 2022 05:49) (70 - 91)  BP: 120/53 (02 Mar 2022 05:49) (87/50 - 136/66)  BP(mean): 71 (01 Mar 2022 17:00) (62 - 76)  RR: 18 (01 Mar 2022 23:35) (10 - 23)  SpO2: 99% (01 Mar 2022 23:35) (96% - 100%)      CAPILLARY BLOOD GLUCOSE      POCT Blood Glucose.: 449 mg/dL (02 Mar 2022 08:42)  POCT Blood Glucose.: 188 mg/dL (01 Mar 2022 23:37)  POCT Blood Glucose.: 213 mg/dL (01 Mar 2022 21:24)  POCT Blood Glucose.: 163 mg/dL (01 Mar 2022 17:32)  POCT Blood Glucose.: 214 mg/dL (01 Mar 2022 16:11)  POCT Blood Glucose.: 285 mg/dL (01 Mar 2022 11:57)    CAPILLARY BLOOD GLUCOSE      POCT Blood Glucose.: 449 mg/dL (02 Mar 2022 08:42)    I&O's Summary    01 Mar 2022 07:01  -  02 Mar 2022 07:00  --------------------------------------------------------  IN: 200 mL / OUT: 1000 mL / NET: -800 mL      Daily     PHYSICAL EXAMINATION:  General: Comfortable, no acute distress, cooperative with exam.  HEENT: Moist mucous membranes.  Respiratory: Crackles right base only   CV: RRR, S1S2, no murmurs, rubs or gallops. No JVD. Distal pulses intact.  Abdominal: Soft, nontender, nondistended, no rebound or guarding, normal bowel sounds.  Neurology: AOx3, no focal neuro defects, VELARDE x 4.  Extremities: No pitting edema, warm.  Incisions:   Tubes:    LABS:                          11.4   8.71  )-----------( 133      ( 02 Mar 2022 06:57 )             33.6     03-02    135  |  102  |  46<H>  ----------------------------<  355<H>  4.3   |  18<L>  |  2.22<H>    Ca    8.3<L>      02 Mar 2022 06:57  Phos  3.7     03-02  Mg     2.10     03-02    TPro  6.4  /  Alb  3.6  /  TBili  0.9  /  DBili  x   /  AST  19  /  ALT  13  /  AlkPhos  46  03-01    LIVER FUNCTIONS - ( 01 Mar 2022 07:59 )  Alb: 3.6 g/dL / Pro: 6.4 g/dL / ALK PHOS: 46 U/L / ALT: 13 U/L / AST: 19 U/L / GGT: x               CARDIAC MARKERS:   Reviewed     Urinalysis Basic - ( 2022 18:18 )    Color: Light Yellow / Appearance: Clear / S.021 / pH: x  Gluc: x / Ketone: Negative  / Bili: Negative / Urobili: <2 mg/dL   Blood: x / Protein: Trace / Nitrite: Negative   Leuk Esterase: Negative / RBC: x / WBC x   Sq Epi: x / Non Sq Epi: x / Bacteria: x        TELEMETRY: NSR    EKG:     IMAGING:     TTE with Doppler (w/Cont) (22 @ 08:50)   CONCLUSIONS:  1. Mitral annular calcification, otherwise normal mitral  valve. Mild-moderate mitral regurgitation.  2. Aortic valve not well visualized; appears calcified.  Peak transaortic valve gradient equals 22 mm Hg, mean  transaortic valve gradient equals 11 mm Hg, consistent with  mild aortic stenosis.  3. Endocardial visualization enhanced with intravenous  injection of echo contrast (Definity). Mild segmental left  ventricular systolic dysfunction with hypokinesis of the  inferolateral wall. Visual estimate of EFabout 45%.  *** Compared with echocardiogram of 2019, no  significant changes noted.

## 2022-03-02 NOTE — PROGRESS NOTE ADULT - ATTENDING COMMENTS
patient with known CKD 1.7-2 stage 3-4  known nephrolithiasis history   MABLE/ ATN sec to hemodynamic insults and contrast   has been hypotensive lately   would stop lasix. decrease Coreg further   renal US when able patient with known CKD 1.7-2 stage 3-4  known nephrolithiasis history   MABLE/ ATN sec to hemodynamic insults and contrast   renal US when able

## 2022-03-02 NOTE — PROGRESS NOTE ADULT - PROBLEM SELECTOR PLAN 1
Pt with MABLE in the setting of NSTEMI now s/p stent placement and IABP( now removed 2/28). In January 2022 Pt. had SCr of 2.0 trending up from 1.7 in July 2021. Pt. admitted with Cr. of 2.0 now trending to 2.3 (2/28). Scr. 2.2 (3/2). UA on 26 showing moderate blood but bland otherwise, please repeat with urine electrolytes. Original insult of NSTEMI, contrast, and IABP was the cause of MABLE. Echo reviewed. 1.5L UOP in last 24 hours. Would resume Lasix 20mg PO. Encourage PO intake. Please obtain Renal US. Monitor labs and urine output. Avoid NSAIDs, ACEI/ARBS, RCA and nephrotoxins. Dose medications as per eGFR.    If you have any questions, please feel free to contact me  Sai Bourgeois  Nephrology Fellow  [phone]; Prefer [employer] TEAMS  (After 5pm or on weekends please page the on-call fellow)

## 2022-03-02 NOTE — CONSULT NOTE ADULT - ASSESSMENT
This is an 88 year-old man with past medical history of CAD s/p CABG (1999), PCI stents x 5 (2019), PVD with stent place to Right Posterior Tibial Artery? (2021), T2DM, HTN, HLD, CKD, presented with near syncope and chest pain, admitted for NSTEMI    Consulted for: Uncontrolled T2DM    #Uncontrolled T2DM, c/b CAD, PVD, CKD  A1c 8.8%. Goal < 8%  Inpatient FS goal 140-180  Home regimen:   Recs:   -Lantus _qHS  -Admelog _ qAC. HOLD if NPO  -low dose correctional scale qAC and qHS  -consistent carb diet  -FS qAC and qHS  For d/c:   -basal/bolus vs. basal + orals  -Can fu with     #HTN  BP Goal < 130/80  Continue management per primary team    #HLD  LDL goal < 70  Statin if no contraindications    Haley Guillen MD  Endocrine Fellow  Can be reached via teams. For follow up questions, discharge recommendations, or new consults, please call answering service at [phone] (weekdays); [phone] (nights/weekends) This is an 88 year-old man with past medical history of CAD s/p CABG (1999), PCI stents x 5 (2019), PVD with stent place to Right Posterior Tibial Artery? (2021), T2DM, HTN, HLD, CKD, presented with near syncope and chest pain, admitted for NSTEMI    Consulted for: Uncontrolled T2DM    #Uncontrolled T2DM, c/b CAD, PVD, CKD  A1c 8.8%. Goal < 8%  Inpatient FS goal 140-180  Home regimen: Basaglar 40 units every morning, Jardiance 25 mg daily, and Tradjenta 5 mg daily   Recs:   -Lantus 12 units qHS  -Admelog 4 units qAC. HOLD if NPO  -low dose correctional scale qAC and qHS  -consistent carb diet (patient is eating home food)  -Check C-peptide. Labs show mild ketosis (Bicarb 18, AG 17 when FS are elevated in the 300-400s, but not when FS are in the 100-200s range). If C-peptide is low, will need to stop Jardiance on d/c, as patient may be at risk fo ketoacidosis  -FS qAC and qHS  For d/c:   -Basal + orals. May need to stop Jardiance 25 mg on d/c if C-peptide is low given risk for ketoacidosis. In that case, can send with basal insulin + Tradjenta 5 mg daily  -Can fu with Dr. Tessa Ulloa for Endocrinology.     #HTN  BP Goal < 130/80  Continue management per primary team    #HLD  LDL goal < 70  Statin if no contraindications    Haley Guillen MD  Endocrine Fellow  Can be reached via teams. For follow up questions, discharge recommendations, or new consults, please call answering service at [phone] (weekdays); [phone] (nights/weekends) This is an 88 year-old man with past medical history of CAD s/p CABG (1999), PCI stents x 5 (2019), PVD with stent place to Right Posterior Tibial Artery? (2021), T2DM, HTN, HLD, CKD, presented with near syncope and chest pain, admitted for NSTEMI    Consulted for: Uncontrolled T2DM    #Uncontrolled T2DM, c/b CAD, PVD, CKD  A1c 8.8%. Goal < 8%  Inpatient FS goal 140-180  Home regimen: Basaglar 40 units every morning, Jardiance 25 mg daily, and Tradjenta 5 mg daily   Recs:   -Lantus 12 units qHS  -Admelog 4 units qAC. HOLD if NPO  -low dose correctional scale qAC and qHS  -consistent carb diet (patient is eating home food)  -Check C-peptide. Labs show mild ketosis (Bicarb 18, AG 17 when FS are elevated in the 300-400s, but not when FS are in the 100-200s range). If C-peptide is low, will need to stop Jardiance on d/c, as patient may be at risk fo ketoacidosis  -FS qAC and qHS  - family at bedside requesting nutrition consultation to help with meal planning at home       For d/c:   -Basal + orals. May need to stop Jardiance 25 mg on d/c if C-peptide is low given risk for ketoacidosis. In that case, can send with basal insulin + Tradjenta 5 mg daily  -Can fu with Dr. Tessa Ulloa for Endocrinology.     #HTN  BP Goal < 130/80  Continue management per primary team    #HLD  LDL goal < 70  Statin if no contraindications    Haley Guillen MD  Endocrine Fellow  Can be reached via teams. For follow up questions, discharge recommendations, or new consults, please call answering service at [phone] (weekdays); [phone] (nights/weekends)

## 2022-03-02 NOTE — PROGRESS NOTE ADULT - ATTENDING COMMENTS
88 year old man with history of CAD sp CABG 20-25 years ago, multiple PCI thereafter most recently 2019 PCI SVG->OM; PPM, RLE PAD with stents in place. 2/26/22 had syncope and chest pain. 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 IABP removed yesterday; no events    TTE: 2/27/22  1. Mitral annular calcification, otherwise normal mitral  valve. Mild-moderate mitral regurgitation.  2. Aortic valve not well visualized; appears calcified.  Peak transaortic valve gradient equals 22 mm Hg, mean  transaortic valve gradient equals 11 mm Hg, consistent with  mild aortic stenosis.  3. Endocardial visualization enhanced with intravenous  injection of echo contrast (Definity). Mild segmental left  ventricular systolic dysfunction with hypokinesis of the  inferolateral wall. Visual estimate of EFabout 45%.  *** Compared with echocardiogram of 5/27/2019, no  significant changes noted.  ------------------------------------------------------------------------    Meds:  ASA 81 mg daily  Brilinta 90 mg BID  Coreg 6.25 mg BID  Hydralazine 10 mg TID  Isordil 10 mg TID  Atorvastatin 80 mg daily  SQ heparin    #Neuro- No active issues  #CV- STEMI with PCI to mSVG->OM and POBA proxSVG->OM  IABP pulled yesterday  Heparin on hold in anticipation of dc this afternoon  Continue DAPT with ASA/Brilinta/Statin  Continue Coreg  On Hydral/Isordil  #Renal- Follow Cr; hold lasix  Continue to monitor  #Endo- Ha1c 8.8 now on LANTUS WITH SLIDING SCALE COVERAGE  #DVT ppx- sq heparin  #dc TO RE[facility]

## 2022-03-02 NOTE — CONSULT NOTE ADULT - SUBJECTIVE AND OBJECTIVE BOX
HPI:  This is an 88 year-old man with past medical history of CAD s/p CABG (1999), PCI stents x 5 (2019), PVD with stent place to Right Posterior Tibial Artery? (2021), T2DM, HTN, HLD, CKD, presented with near syncope and chest pain, admitted for NSTEMI    Consulted for: Uncontrolled T2DM    Patient has had T2DM for many years. Most recent A1c from this admission is 8.8%. DM managed by Prescribed Currently takes In the past, has tried Uses for glucose monitoring. FS at home  No hypoglycemic episodes. Diet is average. Ophtho f/u up to date, no retinopathy. No neuropathy. GFR is 28, has CKD. (+) MI with CABG, (+) PVD CVA    PAST MEDICAL & SURGICAL HISTORY:  Hyperlipemia    Hypertension    Coronary Artery Disease    Diabetes Mellitus Type II    Stented Coronary Artery  total 5 stents, last stent 5/2019    Neuropathy    Myocardial infarction    Stroke  mild left facial numbness   no other residuals verbalized    History of Cataract Extraction    Hx of CABG    H/O coronary angiogram    S/P coronary artery stent placement  1/6/09        FAMILY HISTORY:      Social History:    Home Medications:  ammonium lactate 12% topical cream: Apply topically to affected area 2 times a day (26 Feb 2022 16:05)  aspirin 81 mg oral delayed release tablet: 1 tab(s) orally once a day (26 Feb 2022 16:05)  Basaglar KwikPen 100 units/mL subcutaneous solution: 35 unit(s) subcutaneous once a day (at bedtime) (26 Feb 2022 16:05)  Dexilant 60 mg oral delayed release capsule: 1 cap(s) orally once a day (26 Feb 2022 16:05)  furosemide 20 mg oral tablet: 1 tab(s) orally once a day (26 Feb 2022 16:05)  gabapentin 400 mg oral tablet: 400 milligram(s) orally once a day (26 Feb 2022 16:05)  Jardiance 25 mg oral tablet: 1 tab(s) orally once a day (in the morning) (26 Feb 2022 16:05)  ketoconazole 2% topical cream: Apply topically to affected area once a day (26 Feb 2022 16:05)  Linzess 145 mcg oral capsule: 1 cap(s) orally once a day (26 Feb 2022 16:05)  Metamucil MultiHealth Fiber Sugar-free 3.4 g/5.4 g oral powder for reconstitution: 1 application orally once a day (26 Feb 2022 16:05)  Micardis 20 mg oral tablet: 1 tab(s) orally once a day (26 Feb 2022 16:05)  Ranexa 500 mg oral tablet, extended release: 1 tab(s) orally 2 times a day (26 Feb 2022 16:05)  rosuvastatin 40 mg oral tablet: 1 tab(s) orally once a day (26 Feb 2022 16:05)  Tradjenta 5 mg oral tablet: 1 tab(s) orally once a day (26 Feb 2022 16:05)  Vitamin D3 25 mcg (1000 intl units) oral capsule: 1 cap(s) orally once a day (26 Feb 2022 16:05)      MEDICATIONS  (STANDING):  aspirin enteric coated 81 milliGRAM(s) Oral daily  atorvastatin 80 milliGRAM(s) Oral at bedtime  carvedilol 6.25 milliGRAM(s) Oral every 12 hours  chlorhexidine 4% Liquid 1 Application(s) Topical <User Schedule>  dextrose 40% Gel 15 Gram(s) Oral once  dextrose 5%. 1000 milliLiter(s) (50 mL/Hr) IV Continuous <Continuous>  dextrose 5%. 1000 milliLiter(s) (100 mL/Hr) IV Continuous <Continuous>  dextrose 50% Injectable 25 Gram(s) IV Push once  dextrose 50% Injectable 12.5 Gram(s) IV Push once  dextrose 50% Injectable 25 Gram(s) IV Push once  furosemide    Tablet 20 milliGRAM(s) Oral daily  gabapentin 200 milliGRAM(s) Oral two times a day  glucagon  Injectable 1 milliGRAM(s) IntraMuscular once  heparin   Injectable 5000 Unit(s) SubCutaneous every 8 hours  hydrALAZINE 10 milliGRAM(s) Oral three times a day  insulin glargine Injectable (LANTUS) 12 Unit(s) SubCutaneous at bedtime  insulin lispro (ADMELOG) corrective regimen sliding scale   SubCutaneous three times a day before meals  insulin lispro (ADMELOG) corrective regimen sliding scale   SubCutaneous at bedtime  insulin lispro Injectable (ADMELOG) 4 Unit(s) SubCutaneous three times a day before meals  isosorbide   dinitrate Tablet (ISORDIL) 10 milliGRAM(s) Oral three times a day  pantoprazole    Tablet 40 milliGRAM(s) Oral before breakfast  polyethylene glycol 3350 17 Gram(s) Oral daily  senna 2 Tablet(s) Oral at bedtime  ticagrelor 90 milliGRAM(s) Oral every 12 hours    MEDICATIONS  (PRN):      Allergies    carbamazepine (Other)  Cipro (Unknown)  fluoroquinolone antibiotics (Other)  Tegretol (Unknown)    Intolerances      Review of Systems:  Constitutional: No fever  Eyes: No blurry vision  Neuro: No tremors  HEENT: No pain  Cardiovascular: No chest pain, palpitations  Respiratory: No SOB, no cough  GI: No nausea, vomiting, abdominal pain  : No dysuria  Skin: no rash  Psych: no depression  Endocrine: no polyuria, polydipsia  Hem/lymph: no swelling  Osteoporosis: no fractures    PHYSICAL EXAM:  VITALS: T(C): 36.7 (03-02-22 @ 12:38)  T(F): 98.1 (03-02-22 @ 12:38), Max: 98.1 (03-01-22 @ 16:00)  HR: 80 (03-02-22 @ 12:38) (79 - 91)  BP: 129/57 (03-02-22 @ 12:38) (102/55 - 136/66)  RR:  (14 - 18)  SpO2:  (96% - 100%)  Wt(kg): --  GENERAL: NAD  EYES: No proptosis, no lid lag, anicteric  THYROID: Normal size, no palpable nodules  RESPIRATORY: Clear to auscultation bilaterally  CARDIOVASCULAR: Regular rate and rhythm  GI: Soft, nontender, non distended  EXT: b/l feet without wounds; 2+ pulses  PSYCH: Alert and oriented x 3, reactive mood    POCT Blood Glucose.: 297 mg/dL (03-02-22 @ 12:21)  POCT Blood Glucose.: 449 mg/dL (03-02-22 @ 08:42)  POCT Blood Glucose.: 188 mg/dL (03-01-22 @ 23:37)  POCT Blood Glucose.: 213 mg/dL (03-01-22 @ 21:24)  POCT Blood Glucose.: 163 mg/dL (03-01-22 @ 17:32)  POCT Blood Glucose.: 214 mg/dL (03-01-22 @ 16:11)  POCT Blood Glucose.: 285 mg/dL (03-01-22 @ 11:57)  POCT Blood Glucose.: 174 mg/dL (03-01-22 @ 08:21)  POCT Blood Glucose.: 269 mg/dL (02-28-22 @ 21:22)  POCT Blood Glucose.: 139 mg/dL (02-28-22 @ 16:58)  POCT Blood Glucose.: 195 mg/dL (02-28-22 @ 11:47)  POCT Blood Glucose.: 115 mg/dL (02-28-22 @ 07:53)  POCT Blood Glucose.: 193 mg/dL (02-27-22 @ 21:17)  POCT Blood Glucose.: 127 mg/dL (02-27-22 @ 17:10)                            11.4   8.71  )-----------( 133      ( 02 Mar 2022 06:57 )             33.6       03-02    135  |  102  |  46<H>  ----------------------------<  355<H>  4.3   |  18<L>  |  2.22<H>    EGFR if : x   EGFR if non : x     Ca    8.3<L>      03-02  Mg     2.10     03-02  Phos  3.7     03-02    TPro  6.4  /  Alb  3.6  /  TBili  0.9  /  DBili  x   /  AST  19  /  ALT  13  /  AlkPhos  46  03-01      Thyroid Function Tests:  02-27 @ 02:26 TSH 1.71 FreeT4 -- T3 66 Anti TPO -- Anti Thyroglobulin Ab -- TSI --      A1C with Estimated Average Glucose Result: 8.8 % (02-27-22 @ 02:24)      02-27 Chol 113 Direct LDL -- LDL calculated 53 HDL 51 Trig 46    Radiology:                HPI:  This is an 88 year-old man with past medical history of CAD s/p CABG (1999), PCI stents x 5 (2019), PVD with stent place to Right Posterior Tibial Artery? (2021), T2DM, HTN, HLD, CKD, presented with near syncope and chest pain, admitted for NSTEMI    Consulted for: Uncontrolled T2DM    Patient has had T2DM for many years. Most recent A1c from this admission is 8.8%. DM managed by Dr. Tessa Ulloa. Prescribed Basaglar 40 units BID, Jardiance 25 mg daily, and Tradjenta 5 mg daily. However, patient noted lows with  Basaglar 40 twice a day, so he only takes it once int he AM. Uses glucometer for glucose monitoring. FS at home are 160-200s, rare hypoglycemia. Diet is high in carbs (rice/roti) Ophtho f/u up to date, has cataracts. No neuropathy. GFR is 28, has CKD. (+) MI with CABG, (+) PVD CVA    PAST MEDICAL & SURGICAL HISTORY:  Hyperlipemia    Hypertension    Coronary Artery Disease    Diabetes Mellitus Type II    Stented Coronary Artery  total 5 stents, last stent 5/2019    Neuropathy    Myocardial infarction    Stroke  mild left facial numbness   no other residuals verbalized    History of Cataract Extraction    Hx of CABG    H/O coronary angiogram    S/P coronary artery stent placement  1/6/09        FAMILY HISTORY: History of DM in parents      Social History: No tobacco use    Home Medications:  ammonium lactate 12% topical cream: Apply topically to affected area 2 times a day (26 Feb 2022 16:05)  aspirin 81 mg oral delayed release tablet: 1 tab(s) orally once a day (26 Feb 2022 16:05)  Basaglar KwikPen 100 units/mL subcutaneous solution: 35 unit(s) subcutaneous once a day (at bedtime) (26 Feb 2022 16:05)  Dexilant 60 mg oral delayed release capsule: 1 cap(s) orally once a day (26 Feb 2022 16:05)  furosemide 20 mg oral tablet: 1 tab(s) orally once a day (26 Feb 2022 16:05)  gabapentin 400 mg oral tablet: 400 milligram(s) orally once a day (26 Feb 2022 16:05)  Jardiance 25 mg oral tablet: 1 tab(s) orally once a day (in the morning) (26 Feb 2022 16:05)  ketoconazole 2% topical cream: Apply topically to affected area once a day (26 Feb 2022 16:05)  Linzess 145 mcg oral capsule: 1 cap(s) orally once a day (26 Feb 2022 16:05)  Metamucil MultiHealth Fiber Sugar-free 3.4 g/5.4 g oral powder for reconstitution: 1 application orally once a day (26 Feb 2022 16:05)  Micardis 20 mg oral tablet: 1 tab(s) orally once a day (26 Feb 2022 16:05)  Ranexa 500 mg oral tablet, extended release: 1 tab(s) orally 2 times a day (26 Feb 2022 16:05)  rosuvastatin 40 mg oral tablet: 1 tab(s) orally once a day (26 Feb 2022 16:05)  Tradjenta 5 mg oral tablet: 1 tab(s) orally once a day (26 Feb 2022 16:05)  Vitamin D3 25 mcg (1000 intl units) oral capsule: 1 cap(s) orally once a day (26 Feb 2022 16:05)      MEDICATIONS  (STANDING):  aspirin enteric coated 81 milliGRAM(s) Oral daily  atorvastatin 80 milliGRAM(s) Oral at bedtime  carvedilol 6.25 milliGRAM(s) Oral every 12 hours  chlorhexidine 4% Liquid 1 Application(s) Topical <User Schedule>  dextrose 40% Gel 15 Gram(s) Oral once  dextrose 5%. 1000 milliLiter(s) (50 mL/Hr) IV Continuous <Continuous>  dextrose 5%. 1000 milliLiter(s) (100 mL/Hr) IV Continuous <Continuous>  dextrose 50% Injectable 25 Gram(s) IV Push once  dextrose 50% Injectable 12.5 Gram(s) IV Push once  dextrose 50% Injectable 25 Gram(s) IV Push once  furosemide    Tablet 20 milliGRAM(s) Oral daily  gabapentin 200 milliGRAM(s) Oral two times a day  glucagon  Injectable 1 milliGRAM(s) IntraMuscular once  heparin   Injectable 5000 Unit(s) SubCutaneous every 8 hours  hydrALAZINE 10 milliGRAM(s) Oral three times a day  insulin glargine Injectable (LANTUS) 12 Unit(s) SubCutaneous at bedtime  insulin lispro (ADMELOG) corrective regimen sliding scale   SubCutaneous three times a day before meals  insulin lispro (ADMELOG) corrective regimen sliding scale   SubCutaneous at bedtime  insulin lispro Injectable (ADMELOG) 4 Unit(s) SubCutaneous three times a day before meals  isosorbide   dinitrate Tablet (ISORDIL) 10 milliGRAM(s) Oral three times a day  pantoprazole    Tablet 40 milliGRAM(s) Oral before breakfast  polyethylene glycol 3350 17 Gram(s) Oral daily  senna 2 Tablet(s) Oral at bedtime  ticagrelor 90 milliGRAM(s) Oral every 12 hours    MEDICATIONS  (PRN):      Allergies    carbamazepine (Other)  Cipro (Unknown)  fluoroquinolone antibiotics (Other)  Tegretol (Unknown)    Intolerances      Review of Systems:  Constitutional: No fever  Eyes: No blurry vision  Neuro: No tremors  HEENT: No pain  Cardiovascular: No chest pain, palpitations  Respiratory: No SOB, no cough  GI: No nausea, vomiting, abdominal pain  : No dysuria  Skin: no rash  Psych: no depression  Endocrine: no polyuria, polydipsia  Hem/lymph: no swelling  Osteoporosis: no fractures    PHYSICAL EXAM:  VITALS: T(C): 36.7 (03-02-22 @ 12:38)  T(F): 98.1 (03-02-22 @ 12:38), Max: 98.1 (03-01-22 @ 16:00)  HR: 80 (03-02-22 @ 12:38) (79 - 91)  BP: 129/57 (03-02-22 @ 12:38) (102/55 - 136/66)  RR:  (14 - 18)  SpO2:  (96% - 100%)  Wt(kg): --  GENERAL: NAD  EYES: No proptosis, no lid lag, anicteric  THYROID: Normal size, no palpable nodules  RESPIRATORY: Clear to auscultation bilaterally  CARDIOVASCULAR: Regular rate and rhythm  GI: Soft, nontender, non distended  EXT: b/l feet without wounds; 2+ pulses  PSYCH: Alert and oriented x 3, reactive mood    POCT Blood Glucose.: 297 mg/dL (03-02-22 @ 12:21)  POCT Blood Glucose.: 449 mg/dL (03-02-22 @ 08:42)  POCT Blood Glucose.: 188 mg/dL (03-01-22 @ 23:37)  POCT Blood Glucose.: 213 mg/dL (03-01-22 @ 21:24)  POCT Blood Glucose.: 163 mg/dL (03-01-22 @ 17:32)  POCT Blood Glucose.: 214 mg/dL (03-01-22 @ 16:11)  POCT Blood Glucose.: 285 mg/dL (03-01-22 @ 11:57)  POCT Blood Glucose.: 174 mg/dL (03-01-22 @ 08:21)  POCT Blood Glucose.: 269 mg/dL (02-28-22 @ 21:22)  POCT Blood Glucose.: 139 mg/dL (02-28-22 @ 16:58)  POCT Blood Glucose.: 195 mg/dL (02-28-22 @ 11:47)  POCT Blood Glucose.: 115 mg/dL (02-28-22 @ 07:53)  POCT Blood Glucose.: 193 mg/dL (02-27-22 @ 21:17)  POCT Blood Glucose.: 127 mg/dL (02-27-22 @ 17:10)                            11.4   8.71  )-----------( 133      ( 02 Mar 2022 06:57 )             33.6       03-02    135  |  102  |  46<H>  ----------------------------<  355<H>  4.3   |  18<L>  |  2.22<H>    EGFR if : x   EGFR if non : x     Ca    8.3<L>      03-02  Mg     2.10     03-02  Phos  3.7     03-02    TPro  6.4  /  Alb  3.6  /  TBili  0.9  /  DBili  x   /  AST  19  /  ALT  13  /  AlkPhos  46  03-01      Thyroid Function Tests:  02-27 @ 02:26 TSH 1.71 FreeT4 -- T3 66 Anti TPO -- Anti Thyroglobulin Ab -- TSI --      A1C with Estimated Average Glucose Result: 8.8 % (02-27-22 @ 02:24)      02-27 Chol 113 Direct LDL -- LDL calculated 53 HDL 51 Trig 46    Radiology:

## 2022-03-02 NOTE — PROGRESS NOTE ADULT - SUBJECTIVE AND OBJECTIVE BOX
[facility] DIVISION OF KIDNEY DISEASES AND HYPERTENSION -- FOLLOW UP NOTE  --------------------------------------------------------------------------------  HPI:  Pt. is an 88 year-old man with past medical history of CAD s/p CABG (1999), PCI stents x 5 (2019), PVD with stent place to Right Posterior Tibial Artery? (2021), T2DM, HTN, HLD, CKD, presented with near syncope and chest pain.  Patient's son states he has been feeling generalized fatigue for past few weeks but this morning nearly collapsed when standing and developed chest pressure.  Called his cardiologist, Dr. Corona, who advised he go to ED for evaluation.  Pt continues to report chest discomfort and generalized fatigue. Was due for dental appt on day of admission and had not taken asa for few days. 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 Received asa 325mg PO x1 , Brilinta 180mg PO x1 and heparin drip for ACS. Trop 143. Received insulin/dex 50% and mike gluconate IV and lokema  for K+ 5.9. He was taken to cath lab for ongoing chest discomfort.  Patient went urgently to cath lab for sten to SVG to OM1 with IABP placed. Removed 2/28.    24 hour events/subjective:    Pt. endorses fatigue. He is able to tolerates some PO liquids. No constipation. UOP has been adequate. BP better overnight. Denies any shortness of breath.     PAST HISTORY  --------------------------------------------------------------------------------  No significant changes to PMH, PSH, FHx, SHx, unless otherwise noted    ALLERGIES & MEDICATIONS  --------------------------------------------------------------------------------  Allergies    carbamazepine (Other)  Cipro (Unknown)  fluoroquinolone antibiotics (Other)  Tegretol (Unknown)    Intolerances      Standing Inpatient Medications  aspirin enteric coated 81 milliGRAM(s) Oral daily  atorvastatin 80 milliGRAM(s) Oral at bedtime  carvedilol 6.25 milliGRAM(s) Oral every 12 hours  chlorhexidine 4% Liquid 1 Application(s) Topical <User Schedule>  dextrose 40% Gel 15 Gram(s) Oral once  dextrose 5%. 1000 milliLiter(s) IV Continuous <Continuous>  dextrose 5%. 1000 milliLiter(s) IV Continuous <Continuous>  dextrose 50% Injectable 25 Gram(s) IV Push once  dextrose 50% Injectable 12.5 Gram(s) IV Push once  dextrose 50% Injectable 25 Gram(s) IV Push once  gabapentin 200 milliGRAM(s) Oral two times a day  glucagon  Injectable 1 milliGRAM(s) IntraMuscular once  heparin   Injectable 5000 Unit(s) SubCutaneous every 8 hours  hydrALAZINE 10 milliGRAM(s) Oral three times a day  insulin glargine Injectable (LANTUS) 10 Unit(s) SubCutaneous at bedtime  insulin lispro (ADMELOG) corrective regimen sliding scale   SubCutaneous three times a day before meals  insulin lispro (ADMELOG) corrective regimen sliding scale   SubCutaneous at bedtime  insulin lispro Injectable (ADMELOG) 3 Unit(s) SubCutaneous three times a day before meals  isosorbide   dinitrate Tablet (ISORDIL) 10 milliGRAM(s) Oral three times a day  pantoprazole    Tablet 40 milliGRAM(s) Oral before breakfast  polyethylene glycol 3350 17 Gram(s) Oral daily  senna 2 Tablet(s) Oral at bedtime  ticagrelor 90 milliGRAM(s) Oral every 12 hours    PRN Inpatient Medications      REVIEW OF SYSTEMS  --------------------------------------------------------------------------------  Gen: No fevers/chills, Fatigue+  Skin: No rashes  Head/Eyes/Ears: Normal hearing,   Respiratory: No dyspnea, cough  CV: No chest pain  GI: No abdominal pain, diarrhea  : No dysuria, hematuria  MSK: No  edema  Heme: No easy bruising or bleeding  Psych: No significant depression      All other systems were reviewed and are negative, except as noted.    VITALS/PHYSICAL EXAM  --------------------------------------------------------------------------------  T(C): 36.5 (03-02-22 @ 05:49), Max: 36.7 (03-01-22 @ 16:00)  HR: 88 (03-02-22 @ 05:49) (70 - 91)  BP: 120/53 (03-02-22 @ 05:49) (87/50 - 136/66)  RR: 18 (03-01-22 @ 23:35) (10 - 23)  SpO2: 99% (03-01-22 @ 23:35) (96% - 100%)  Wt(kg): --        03-01-22 @ 07:01  -  03-02-22 @ 07:00  --------------------------------------------------------  IN: 200 mL / OUT: 1000 mL / NET: -800 mL    Physical Exam:  	Gen: NAD  	HEENT: MMM  	Pulm: CTA B/L- anteriorly, lying flat on RA  	CV: S1S2  	Abd: Soft, +BS   	Ext: No LE edema B/L  	Neuro: Awake  	Skin: Warm and dry  	Vascular access: Peripheral    LABS/STUDIES  --------------------------------------------------------------------------------              11.4   8.71  >-----------<  133      [03-02-22 @ 06:57]              33.6     135  |  102  |  46  ----------------------------<  355      [03-02-22 @ 06:57]  4.3   |  18  |  2.22        Ca     8.3     [03-02-22 @ 06:57]      Mg     2.10     [03-02-22 @ 06:57]      Phos  3.7     [03-02-22 @ 06:57]    TPro  6.4  /  Alb  3.6  /  TBili  0.9  /  DBili  x   /  AST  19  /  ALT  13  /  AlkPhos  46  [03-01-22 @ 07:59]              [03-01-22 @ 07:59]    Creatinine Trend:  SCr 2.22 [03-02 @ 06:57]  SCr 1.97 [03-01 @ 07:59]  SCr 2.32 [02-28 @ 00:26]  SCr 2.15 [02-27 @ 02:24]  SCr 2.07 [02-26 @ 14:40]    Urinalysis - [02-28-22 @ 18:18]      Color Light Yellow / Appearance Clear / SG 1.021 / pH 6.0      Gluc >= 1000 mg/dL / Ketone Negative  / Bili Negative / Urobili <2 mg/dL       Blood Negative / Protein Trace / Leuk Est Negative / Nitrite Negative      RBC  / WBC  / Hyaline  / Gran  / Sq Epi  / Non Sq Epi  / Bacteria     Urine Creatinine 69      [02-28-22 @ 18:18]  Urine Sodium 52      [02-28-22 @ 18:18]  Urine Urea Nitrogen 687.0      [02-28-22 @ 18:18]    HbA1c 9.4      [05-24-19 @ 23:30]  TSH 1.71      [02-27-22 @ 02:26]  Lipid: chol 113, TG 46, HDL 51, LDL --      [02-27-22 @ 02:26]

## 2022-03-03 LAB
ANION GAP SERPL CALC-SCNC: 14 MMOL/L — SIGNIFICANT CHANGE UP (ref 7–14)
BUN SERPL-MCNC: 45 MG/DL — HIGH (ref 7–23)
C PEPTIDE SERPL-MCNC: 5.9 NG/ML — HIGH (ref 1.1–4.4)
CALCIUM SERPL-MCNC: 8.8 MG/DL — SIGNIFICANT CHANGE UP (ref 8.4–10.5)
CHLORIDE SERPL-SCNC: 104 MMOL/L — SIGNIFICANT CHANGE UP (ref 98–107)
CO2 SERPL-SCNC: 19 MMOL/L — LOW (ref 22–31)
CREAT SERPL-MCNC: 2.06 MG/DL — HIGH (ref 0.5–1.3)
EGFR: 30 ML/MIN/1.73M2 — LOW
GLUCOSE SERPL-MCNC: 293 MG/DL — HIGH (ref 70–99)
HCT VFR BLD CALC: 37.2 % — LOW (ref 39–50)
HGB BLD-MCNC: 12.2 G/DL — LOW (ref 13–17)
MAGNESIUM SERPL-MCNC: 2.3 MG/DL — SIGNIFICANT CHANGE UP (ref 1.6–2.6)
MCHC RBC-ENTMCNC: 29.5 PG — SIGNIFICANT CHANGE UP (ref 27–34)
MCHC RBC-ENTMCNC: 32.8 GM/DL — SIGNIFICANT CHANGE UP (ref 32–36)
MCV RBC AUTO: 89.9 FL — SIGNIFICANT CHANGE UP (ref 80–100)
NRBC # BLD: 0 /100 WBCS — SIGNIFICANT CHANGE UP
NRBC # FLD: 0 K/UL — SIGNIFICANT CHANGE UP
PHOSPHATE SERPL-MCNC: 3.8 MG/DL — SIGNIFICANT CHANGE UP (ref 2.5–4.5)
PLATELET # BLD AUTO: 159 K/UL — SIGNIFICANT CHANGE UP (ref 150–400)
POTASSIUM SERPL-MCNC: 4.7 MMOL/L — SIGNIFICANT CHANGE UP (ref 3.5–5.3)
POTASSIUM SERPL-SCNC: 4.7 MMOL/L — SIGNIFICANT CHANGE UP (ref 3.5–5.3)
RBC # BLD: 4.14 M/UL — LOW (ref 4.2–5.8)
RBC # FLD: 14.5 % — SIGNIFICANT CHANGE UP (ref 10.3–14.5)
SARS-COV-2 RNA SPEC QL NAA+PROBE: SIGNIFICANT CHANGE UP
SODIUM SERPL-SCNC: 137 MMOL/L — SIGNIFICANT CHANGE UP (ref 135–145)
WBC # BLD: 8.13 K/UL — SIGNIFICANT CHANGE UP (ref 3.8–10.5)
WBC # FLD AUTO: 8.13 K/UL — SIGNIFICANT CHANGE UP (ref 3.8–10.5)

## 2022-03-03 PROCEDURE — 99232 SBSQ HOSP IP/OBS MODERATE 35: CPT

## 2022-03-03 PROCEDURE — 99222 1ST HOSP IP/OBS MODERATE 55: CPT

## 2022-03-03 PROCEDURE — 99232 SBSQ HOSP IP/OBS MODERATE 35: CPT | Mod: GC

## 2022-03-03 PROCEDURE — 99233 SBSQ HOSP IP/OBS HIGH 50: CPT

## 2022-03-03 RX ORDER — INSULIN LISPRO 100/ML
6 VIAL (ML) SUBCUTANEOUS
Refills: 0 | Status: DISCONTINUED | OUTPATIENT
Start: 2022-03-03 | End: 2022-03-05

## 2022-03-03 RX ORDER — INSULIN GLARGINE 100 [IU]/ML
15 INJECTION, SOLUTION SUBCUTANEOUS AT BEDTIME
Refills: 0 | Status: DISCONTINUED | OUTPATIENT
Start: 2022-03-03 | End: 2022-03-04

## 2022-03-03 RX ADMIN — ATORVASTATIN CALCIUM 80 MILLIGRAM(S): 80 TABLET, FILM COATED ORAL at 21:42

## 2022-03-03 RX ADMIN — GABAPENTIN 200 MILLIGRAM(S): 400 CAPSULE ORAL at 06:04

## 2022-03-03 RX ADMIN — PANTOPRAZOLE SODIUM 40 MILLIGRAM(S): 20 TABLET, DELAYED RELEASE ORAL at 06:03

## 2022-03-03 RX ADMIN — CARVEDILOL PHOSPHATE 6.25 MILLIGRAM(S): 80 CAPSULE, EXTENDED RELEASE ORAL at 06:04

## 2022-03-03 RX ADMIN — TICAGRELOR 90 MILLIGRAM(S): 90 TABLET ORAL at 17:55

## 2022-03-03 RX ADMIN — ISOSORBIDE DINITRATE 10 MILLIGRAM(S): 5 TABLET ORAL at 21:41

## 2022-03-03 RX ADMIN — GABAPENTIN 200 MILLIGRAM(S): 400 CAPSULE ORAL at 17:55

## 2022-03-03 RX ADMIN — Medication 10 MILLIGRAM(S): at 14:42

## 2022-03-03 RX ADMIN — Medication 81 MILLIGRAM(S): at 12:38

## 2022-03-03 RX ADMIN — HEPARIN SODIUM 5000 UNIT(S): 5000 INJECTION INTRAVENOUS; SUBCUTANEOUS at 06:03

## 2022-03-03 RX ADMIN — Medication 1: at 21:51

## 2022-03-03 RX ADMIN — POLYETHYLENE GLYCOL 3350 17 GRAM(S): 17 POWDER, FOR SOLUTION ORAL at 12:38

## 2022-03-03 RX ADMIN — Medication 6 UNIT(S): at 17:56

## 2022-03-03 RX ADMIN — INSULIN GLARGINE 15 UNIT(S): 100 INJECTION, SOLUTION SUBCUTANEOUS at 21:51

## 2022-03-03 RX ADMIN — Medication 10 MILLIGRAM(S): at 06:04

## 2022-03-03 RX ADMIN — ISOSORBIDE DINITRATE 10 MILLIGRAM(S): 5 TABLET ORAL at 06:03

## 2022-03-03 RX ADMIN — Medication 3: at 13:15

## 2022-03-03 RX ADMIN — Medication 4 UNIT(S): at 13:15

## 2022-03-03 RX ADMIN — Medication 10 MILLIGRAM(S): at 21:42

## 2022-03-03 RX ADMIN — TICAGRELOR 90 MILLIGRAM(S): 90 TABLET ORAL at 06:03

## 2022-03-03 RX ADMIN — HEPARIN SODIUM 5000 UNIT(S): 5000 INJECTION INTRAVENOUS; SUBCUTANEOUS at 14:43

## 2022-03-03 RX ADMIN — Medication 4 UNIT(S): at 09:39

## 2022-03-03 RX ADMIN — SENNA PLUS 2 TABLET(S): 8.6 TABLET ORAL at 21:42

## 2022-03-03 RX ADMIN — Medication 3: at 17:57

## 2022-03-03 RX ADMIN — CARVEDILOL PHOSPHATE 6.25 MILLIGRAM(S): 80 CAPSULE, EXTENDED RELEASE ORAL at 17:55

## 2022-03-03 RX ADMIN — HEPARIN SODIUM 5000 UNIT(S): 5000 INJECTION INTRAVENOUS; SUBCUTANEOUS at 21:42

## 2022-03-03 RX ADMIN — Medication 3: at 09:40

## 2022-03-03 RX ADMIN — Medication 20 MILLIGRAM(S): at 14:42

## 2022-03-03 RX ADMIN — ISOSORBIDE DINITRATE 10 MILLIGRAM(S): 5 TABLET ORAL at 14:42

## 2022-03-03 NOTE — PROGRESS NOTE ADULT - SUBJECTIVE AND OBJECTIVE BOX
Chief Complaint/Follow-up on:   DM    Subjective:    POC blood glucose and insulin use reviewed.  pt with hyperglycemia   pt reports no n/v   eating food from home that family brings       MEDICATIONS  (STANDING):  aspirin enteric coated 81 milliGRAM(s) Oral daily  atorvastatin 80 milliGRAM(s) Oral at bedtime  carvedilol 6.25 milliGRAM(s) Oral every 12 hours  dextrose 40% Gel 15 Gram(s) Oral once  dextrose 5%. 1000 milliLiter(s) (50 mL/Hr) IV Continuous <Continuous>  dextrose 5%. 1000 milliLiter(s) (100 mL/Hr) IV Continuous <Continuous>  dextrose 50% Injectable 25 Gram(s) IV Push once  dextrose 50% Injectable 12.5 Gram(s) IV Push once  dextrose 50% Injectable 25 Gram(s) IV Push once  furosemide    Tablet 20 milliGRAM(s) Oral daily  gabapentin 200 milliGRAM(s) Oral two times a day  glucagon  Injectable 1 milliGRAM(s) IntraMuscular once  heparin   Injectable 5000 Unit(s) SubCutaneous every 8 hours  hydrALAZINE 10 milliGRAM(s) Oral three times a day  insulin glargine Injectable (LANTUS) 12 Unit(s) SubCutaneous at bedtime  insulin lispro (ADMELOG) corrective regimen sliding scale   SubCutaneous three times a day before meals  insulin lispro (ADMELOG) corrective regimen sliding scale   SubCutaneous at bedtime  insulin lispro Injectable (ADMELOG) 4 Unit(s) SubCutaneous three times a day before meals  isosorbide   dinitrate Tablet (ISORDIL) 10 milliGRAM(s) Oral three times a day  pantoprazole    Tablet 40 milliGRAM(s) Oral before breakfast  polyethylene glycol 3350 17 Gram(s) Oral daily  senna 2 Tablet(s) Oral at bedtime  ticagrelor 90 milliGRAM(s) Oral every 12 hours    MEDICATIONS  (PRN):  melatonin 3 milliGRAM(s) Oral at bedtime PRN Insomnia      PHYSICAL EXAM:  VITALS: T(C): 36.7 (03-03-22 @ 14:40)  T(F): 98 (03-03-22 @ 14:40), Max: 98.4 (03-02-22 @ 22:25)  HR: 70 (03-03-22 @ 14:40) (63 - 87)  BP: 122/62 (03-03-22 @ 14:40) (113/67 - 150/67)  RR:  (16 - 18)  SpO2:  (97% - 100%)  Wt(kg): --  GENERAL: NAD, well-groomed, well-developed  RESPIRATORY: Clear to auscultation bilaterally; No rales, rhonchi, wheezing, or rubs  CARDIOVASCULAR: Regular rate and rhythm; No murmurs; no peripheral edema  GI: Soft, nontender, non distended, normal bowel sounds    POCT Blood Glucose.: 297 mg/dL (03-03-22 @ 12:20)  POCT Blood Glucose.: 266 mg/dL (03-03-22 @ 08:57)  POCT Blood Glucose.: 198 mg/dL (03-02-22 @ 21:58)  POCT Blood Glucose.: 205 mg/dL (03-02-22 @ 17:44)  POCT Blood Glucose.: 297 mg/dL (03-02-22 @ 12:21)  POCT Blood Glucose.: 449 mg/dL (03-02-22 @ 08:42)  POCT Blood Glucose.: 188 mg/dL (03-01-22 @ 23:37)  POCT Blood Glucose.: 213 mg/dL (03-01-22 @ 21:24)  POCT Blood Glucose.: 163 mg/dL (03-01-22 @ 17:32)  POCT Blood Glucose.: 214 mg/dL (03-01-22 @ 16:11)  POCT Blood Glucose.: 285 mg/dL (03-01-22 @ 11:57)  POCT Blood Glucose.: 174 mg/dL (03-01-22 @ 08:21)  POCT Blood Glucose.: 269 mg/dL (02-28-22 @ 21:22)  POCT Blood Glucose.: 139 mg/dL (02-28-22 @ 16:58)    03-03    137  |  104  |  45<H>  ----------------------------<  293<H>  4.7   |  19<L>  |  2.06<H>    EGFR if : x   EGFR if non : x     Ca    8.8      03-03  Mg     2.30     03-03  Phos  3.8     03-03    TPro  6.4  /  Alb  3.6  /  TBili  0.9  /  DBili  x   /  AST  19  /  ALT  13  /  AlkPhos  46  03-01          Thyroid Function Tests:  02-27 @ 02:26 TSH 1.71 FreeT4 -- T3 66 Anti TPO -- Anti Thyroglobulin Ab -- TSI --

## 2022-03-03 NOTE — PROGRESS NOTE ADULT - PROBLEM SELECTOR PLAN 1
Pt with MABLE in the setting of NSTEMI now s/p stent placement and IABP( now removed 2/28). In January 2022 Pt. had SCr of 2.0 trending up from 1.7 in July 2021. Pt. admitted with Cr. of 2.0 now trending to 2.3 (2/28). Scr. 2.0 (3/3). UA on 26 showing moderate blood but bland otherwise, please repeat with urine electrolytes. Original insult of NSTEMI, contrast, and IABP was the cause of MABLE. Echo reviewed. 2L UOP in last 24 hours. c/w Lasix 20mg PO. Encourage PO intake. Renal US showing hyperechoic focus in L. Renal Upper Pole, recommend MRI as outpatient. Titrate antihypertensives to optimize hemodynamics. Monitor labs and urine output. Avoid NSAIDs, ACEI/ARBS, RCA and nephrotoxins. Dose medications as per eGFR.    If you have any questions, please feel free to contact me  Sai Bourgeois  Nephrology Fellow  [phone]; Prefer [employer] TEAMS  (After 5pm or on weekends please page the on-call fellow)

## 2022-03-03 NOTE — PROGRESS NOTE ADULT - SUBJECTIVE AND OBJECTIVE BOX
[facility] Division of Hospital Medicine  Yovani Garduno MD  Pager (M-F, 8A-5P): [phone]  Other Times:  [phone]    Patient is a 88y old  Male who presents with a chief complaint of Angina pectoralis (03 Mar 2022 10:00)    SUBJECTIVE / OVERNIGHT EVENTS:  Patient offers no new complaints. Mental status is improved. Wife and son by the bedside.  No F/C, N/V, CP, SOB, Cough, lightheadedness, dizziness, abdominal pain, diarrhea, dysuria.    MEDICATIONS  (STANDING):  aspirin enteric coated 81 milliGRAM(s) Oral daily  atorvastatin 80 milliGRAM(s) Oral at bedtime  carvedilol 6.25 milliGRAM(s) Oral every 12 hours  dextrose 40% Gel 15 Gram(s) Oral once  dextrose 5%. 1000 milliLiter(s) (50 mL/Hr) IV Continuous <Continuous>  dextrose 5%. 1000 milliLiter(s) (100 mL/Hr) IV Continuous <Continuous>  dextrose 50% Injectable 25 Gram(s) IV Push once  dextrose 50% Injectable 12.5 Gram(s) IV Push once  dextrose 50% Injectable 25 Gram(s) IV Push once  furosemide    Tablet 20 milliGRAM(s) Oral daily  gabapentin 200 milliGRAM(s) Oral two times a day  glucagon  Injectable 1 milliGRAM(s) IntraMuscular once  heparin   Injectable 5000 Unit(s) SubCutaneous every 8 hours  hydrALAZINE 10 milliGRAM(s) Oral three times a day  insulin glargine Injectable (LANTUS) 12 Unit(s) SubCutaneous at bedtime  insulin lispro (ADMELOG) corrective regimen sliding scale   SubCutaneous three times a day before meals  insulin lispro (ADMELOG) corrective regimen sliding scale   SubCutaneous at bedtime  insulin lispro Injectable (ADMELOG) 4 Unit(s) SubCutaneous three times a day before meals  isosorbide   dinitrate Tablet (ISORDIL) 10 milliGRAM(s) Oral three times a day  pantoprazole    Tablet 40 milliGRAM(s) Oral before breakfast  polyethylene glycol 3350 17 Gram(s) Oral daily  senna 2 Tablet(s) Oral at bedtime  ticagrelor 90 milliGRAM(s) Oral every 12 hours    MEDICATIONS  (PRN):  melatonin 3 milliGRAM(s) Oral at bedtime PRN Insomnia      Vital Signs Last 24 Hrs  T(C): 36.9 (03 Mar 2022 06:00), Max: 36.9 (02 Mar 2022 22:25)  T(F): 98.4 (03 Mar 2022 06:00), Max: 98.4 (02 Mar 2022 22:25)  HR: 82 (03 Mar 2022 06:00) (63 - 87)  BP: 150/67 (03 Mar 2022 06:00) (113/67 - 150/67)  BP(mean): --  RR: 18 (03 Mar 2022 06:00) (18 - 18)  SpO2: 97% (03 Mar 2022 06:00) (97% - 99%)  CAPILLARY BLOOD GLUCOSE      POCT Blood Glucose.: 297 mg/dL (03 Mar 2022 12:20)  POCT Blood Glucose.: 266 mg/dL (03 Mar 2022 08:57)  POCT Blood Glucose.: 198 mg/dL (02 Mar 2022 21:58)  POCT Blood Glucose.: 205 mg/dL (02 Mar 2022 17:44)    I&O's Summary    02 Mar 2022 07:01  -  03 Mar 2022 07:00  --------------------------------------------------------  IN: 300 mL / OUT: 2000 mL / NET: -1700 mL        PHYSICAL EXAM:  CONSTITUTIONAL: NAD, overweight  EYES: PERRLA; conjunctiva and sclera clear  ENMT: Moist oral mucosa, no pharyngeal injection or exudates; normal dentition  NECK: Supple, no palpable masses; no thyromegaly  RESPIRATORY: Normal respiratory effort; lungs are clear to auscultation bilaterally  CARDIOVASCULAR: Regular rate and rhythm, normal S1 and S2, no murmur/rub/gallop; No lower extremity edema; Peripheral pulses are 2+ bilaterally  ABDOMEN: Nontender to palpation, normoactive bowel sounds, no rebound/guarding; No hepatosplenomegaly  MUSCULOSKELETAL:  Did not assess gait; no clubbing or cyanosis of digits; no joint swelling or tenderness to palpation  PSYCH: A+O to person, place, and time; affect appropriate  NEUROLOGY: CN 2-12 are intact and symmetric; no gross sensory deficits   SKIN: No rashes; no palpable lesions  LABS:                        12.2   8.13  )-----------( 159      ( 03 Mar 2022 08:20 )             37.2     03-03    137  |  104  |  45<H>  ----------------------------<  293<H>  4.7   |  19<L>  |  2.06<H>    Ca    8.8      03 Mar 2022 08:20  Phos  3.8     -  Mg     2.30     -03            Urinalysis Basic - ( 02 Mar 2022 11:17 )    Color: Light Yellow / Appearance: Clear / S.021 / pH: x  Gluc: x / Ketone: Negative  / Bili: Negative / Urobili: <2 mg/dL   Blood: x / Protein: Negative / Nitrite: Negative   Leuk Esterase: Negative / RBC: x / WBC x   Sq Epi: x / Non Sq Epi: x / Bacteria: x        RADIOLOGY & ADDITIONAL TESTS:    Imaging Personally Reviewed:    Care Discussed with Consultants/Other Providers:

## 2022-03-03 NOTE — PROGRESS NOTE ADULT - ASSESSMENT
This is an 88 year-old man with past medical history of CAD s/p CABG (1999), PCI stents x 5 (2019), PVD with stent place to Right Posterior Tibial Artery? (2021), T2DM, HTN, HLD, CKD, presented with near syncope and chest pain, admitted for NSTEMI    Consulted for: Uncontrolled T2DM    #Uncontrolled T2DM, c/b CAD, PVD, CKD  A1c 8.8%. Goal < 8%  Inpatient FS goal 140-180  Home regimen: Basaglar 40 units every morning, Jardiance 25 mg daily, and Tradjenta 5 mg daily   Recs:   -FS qAC and qHS  -increase Lantus 15 units qHS  -increase Admelog 6 units qAC. HOLD if NPO  -low dose correctional scale qAC and qHS  -consistent carb diet (patient is eating home food)  - family at bedside requesting nutrition consultation to help with meal planning at home       For d/c:   -followup C-peptide. Labs showed mild ketosis (Bicarb 18, AG 17 when FS are elevated in the 300-400s, but not when FS are in the 100-200s range). If C-peptide is low, will need to stop Jardiance on d/c, as patient may be at risk fo ketoacidosis and will need basal bolus at dc   -vs Basal + orals. May need to stop Jardiance 25 mg on d/c if C-peptide is low given risk for ketoacidosis. In that case, can send with basal insulin + Tradjenta 5 mg daily  -Can fu with Dr. Tessa Ulloa for Endocrinology.     #HTN  BP Goal < 130/80  Continue management per primary team    #HLD  LDL goal < 70  on lipitor       Lucia Bass MD  Attending Physician   Department of Endocrinology, Diabetes and Metabolism     Pager  [phone] [please provide 10 digit call back number]  KATHIA 9-5  [email or web address]  Nights and weekends: [phone]  Please note that this patient may be followed by a different provider tomorrow.   If no answer or after hours, please contact [phone].  For final dc reccomendations, please call [phone] or page the endocrine fellow on call.

## 2022-03-03 NOTE — PROGRESS NOTE ADULT - PROBLEM SELECTOR PLAN 1
s/p GEMMA  Continue ASA, Brilinta, Lipitor  Appreciate PMR eval - awaiting OT eval - likely can be a candidate for AR otherwise RUDOLPH.

## 2022-03-03 NOTE — PROGRESS NOTE ADULT - NSPROGADDITIONALINFOA_GEN_ALL_CORE
Discussed with wife by the bedside of 3/3 for 20 minutes.  Answered all the questions.  Patient medically optimized for discharge.  Discharge planning 40 minutes - discussed with patient and consultants.
Discussed with wife by the bedside of 3/2 for 20 minutes.  Answered all the questions.

## 2022-03-03 NOTE — PROGRESS NOTE ADULT - SUBJECTIVE AND OBJECTIVE BOX
Subjective/Objective: Resting in bed, feels well, denies chest pain or SOB.    MEDICATIONS  (STANDING):  aspirin enteric coated 81 milliGRAM(s) Oral daily  atorvastatin 80 milliGRAM(s) Oral at bedtime  carvedilol 6.25 milliGRAM(s) Oral every 12 hours  dextrose 40% Gel 15 Gram(s) Oral once  dextrose 5%. 1000 milliLiter(s) (50 mL/Hr) IV Continuous <Continuous>  dextrose 5%. 1000 milliLiter(s) (100 mL/Hr) IV Continuous <Continuous>  dextrose 50% Injectable 25 Gram(s) IV Push once  dextrose 50% Injectable 12.5 Gram(s) IV Push once  dextrose 50% Injectable 25 Gram(s) IV Push once  furosemide    Tablet 20 milliGRAM(s) Oral daily  gabapentin 200 milliGRAM(s) Oral two times a day  glucagon  Injectable 1 milliGRAM(s) IntraMuscular once  heparin   Injectable 5000 Unit(s) SubCutaneous every 8 hours  hydrALAZINE 10 milliGRAM(s) Oral three times a day  insulin glargine Injectable (LANTUS) 12 Unit(s) SubCutaneous at bedtime  insulin lispro (ADMELOG) corrective regimen sliding scale   SubCutaneous three times a day before meals  insulin lispro (ADMELOG) corrective regimen sliding scale   SubCutaneous at bedtime  insulin lispro Injectable (ADMELOG) 4 Unit(s) SubCutaneous three times a day before meals  isosorbide   dinitrate Tablet (ISORDIL) 10 milliGRAM(s) Oral three times a day  pantoprazole    Tablet 40 milliGRAM(s) Oral before breakfast  polyethylene glycol 3350 17 Gram(s) Oral daily  senna 2 Tablet(s) Oral at bedtime  ticagrelor 90 milliGRAM(s) Oral every 12 hours    MEDICATIONS  (PRN):  melatonin 3 milliGRAM(s) Oral at bedtime PRN Insomnia          Vital Signs Last 24 Hrs  T(C): 36.9 (03 Mar 2022 06:00), Max: 36.9 (02 Mar 2022 22:25)  T(F): 98.4 (03 Mar 2022 06:00), Max: 98.4 (02 Mar 2022 22:25)  HR: 82 (03 Mar 2022 06:00) (63 - 87)  BP: 150/67 (03 Mar 2022 06:00) (113/67 - 150/67)  BP(mean): --  RR: 18 (03 Mar 2022 06:00) (16 - 18)  SpO2: 97% (03 Mar 2022 06:00) (97% - 99%)  I&O's Detail    02 Mar 2022 07:01  -  03 Mar 2022 07:00  --------------------------------------------------------  IN:    Oral Fluid: 300 mL  Total IN: 300 mL    OUT:    Voided (mL): 2000 mL  Total OUT: 2000 mL    Total NET: -1700 mL    PHYSICAL EXAM  GEN: NAD, skin W & D  RESP: CTA ant  CV: nl S1S2, no M/R/C  GI: soft, NT/ND, BS +  EXT: no C/C/E  NEURO: A & O X 3    EKG/ TELEM: NSR with RBBB and 1st degree AVHB at baseline    LABS:                          11.4   8.71  )-----------( 133      ( 02 Mar 2022 06:57 )             33.6       02 Mar 2022 06:57    135    |  102    |  46<H>  ----------------------------<  355<H>  4.3     |  18<L>  |  2.22<H>    Ca    8.3<L>      02 Mar 2022 06:57  Phos  3.7       02 Mar 2022 06:57  Mg     2.10      02 Mar 2022 06:57

## 2022-03-03 NOTE — PROGRESS NOTE ADULT - SUBJECTIVE AND OBJECTIVE BOX
[facility] DIVISION OF KIDNEY DISEASES AND HYPERTENSION -- FOLLOW UP NOTE  --------------------------------------------------------------------------------  HPI:  Pt. is an 88 year-old man with past medical history of CAD s/p CABG (1999), PCI stents x 5 (2019), PVD with stent place to Right Posterior Tibial Artery? (2021), T2DM, HTN, HLD, CKD, presented with near syncope and chest pain.  Patient's son states he has been feeling generalized fatigue for past few weeks but this morning nearly collapsed when standing and developed chest pressure.  Called his cardiologist, Dr. Corona, who advised he go to ED for evaluation.  Pt continues to report chest discomfort and generalized fatigue. Was due for dental appt on day of admission and had not taken asa for few days. 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 Received asa 325mg PO x1 , Brilinta 180mg PO x1 and heparin drip for ACS. Trop 143. Received insulin/dex 50% and mike gluconate IV and lokema  for K+ 5.9. He was taken to cath lab for ongoing chest discomfort.  Patient went urgently to cath lab for sten to SVG to OM1 with IABP placed. Removed 2/28. Nephrology consulted for MABLE. Per discussion with family and Pt. at bedside. Pt. had SCr of 1.7 in July 2021 which trended up to 2.0 in January of 2022. Pt. was admitted with Cr. of 2.0 which has trended up to 2.3 on day of consult.    24 hour events/subjective:    Pt. denies any acute complaints. Eating better today. Scr. improved to 2.0 around baseline.     PAST HISTORY  --------------------------------------------------------------------------------  No significant changes to PMH, PSH, FHx, SHx, unless otherwise noted    ALLERGIES & MEDICATIONS  --------------------------------------------------------------------------------  Allergies    carbamazepine (Other)  Cipro (Unknown)  fluoroquinolone antibiotics (Other)  Tegretol (Unknown)    Intolerances      Standing Inpatient Medications  aspirin enteric coated 81 milliGRAM(s) Oral daily  atorvastatin 80 milliGRAM(s) Oral at bedtime  carvedilol 6.25 milliGRAM(s) Oral every 12 hours  dextrose 40% Gel 15 Gram(s) Oral once  dextrose 5%. 1000 milliLiter(s) IV Continuous <Continuous>  dextrose 5%. 1000 milliLiter(s) IV Continuous <Continuous>  dextrose 50% Injectable 25 Gram(s) IV Push once  dextrose 50% Injectable 12.5 Gram(s) IV Push once  dextrose 50% Injectable 25 Gram(s) IV Push once  furosemide    Tablet 20 milliGRAM(s) Oral daily  gabapentin 200 milliGRAM(s) Oral two times a day  glucagon  Injectable 1 milliGRAM(s) IntraMuscular once  heparin   Injectable 5000 Unit(s) SubCutaneous every 8 hours  hydrALAZINE 10 milliGRAM(s) Oral three times a day  insulin glargine Injectable (LANTUS) 12 Unit(s) SubCutaneous at bedtime  insulin lispro (ADMELOG) corrective regimen sliding scale   SubCutaneous three times a day before meals  insulin lispro (ADMELOG) corrective regimen sliding scale   SubCutaneous at bedtime  insulin lispro Injectable (ADMELOG) 4 Unit(s) SubCutaneous three times a day before meals  isosorbide   dinitrate Tablet (ISORDIL) 10 milliGRAM(s) Oral three times a day  pantoprazole    Tablet 40 milliGRAM(s) Oral before breakfast  polyethylene glycol 3350 17 Gram(s) Oral daily  senna 2 Tablet(s) Oral at bedtime  ticagrelor 90 milliGRAM(s) Oral every 12 hours    PRN Inpatient Medications  melatonin 3 milliGRAM(s) Oral at bedtime PRN      REVIEW OF SYSTEMS  --------------------------------------------------------------------------------  Gen: No fevers/chills  Skin: No rashes  Head/Eyes/Ears: Normal hearing,   Respiratory: No dyspnea, cough  CV: No chest pain  GI: No abdominal pain, diarrhea  : No dysuria, hematuria  MSK: No  edema  Heme: No easy bruising or bleeding  Psych: No significant depression      All other systems were reviewed and are negative, except as noted.    VITALS/PHYSICAL EXAM  --------------------------------------------------------------------------------  T(C): 36.7 (03-03-22 @ 14:40), Max: 36.9 (03-02-22 @ 22:25)  HR: 70 (03-03-22 @ 14:40) (63 - 87)  BP: 122/62 (03-03-22 @ 14:40) (113/67 - 150/67)  RR: 16 (03-03-22 @ 14:40) (16 - 18)  SpO2: 100% (03-03-22 @ 14:40) (97% - 100%)  Wt(kg): --    Weight (kg): 77.4 (03-03-22 @ 04:00)      03-02-22 @ 07:01  -  03-03-22 @ 07:00  --------------------------------------------------------  IN: 300 mL / OUT: 2000 mL / NET: -1700 mL    Physical Exam:  	Gen: NAD  	HEENT: MMM  	Pulm: CTA B/L  	CV: S1S2  	Abd: Soft, +BS   	Ext: No LE edema B/L  	Neuro: Awake  	Skin: Warm and dry  	Vascular access: Peripheral    LABS/STUDIES  --------------------------------------------------------------------------------              12.2   8.13  >-----------<  159      [03-03-22 @ 08:20]              37.2     137  |  104  |  45  ----------------------------<  293      [03-03-22 @ 08:20]  4.7   |  19  |  2.06        Ca     8.8     [03-03-22 @ 08:20]      Mg     2.30     [03-03-22 @ 08:20]      Phos  3.8     [03-03-22 @ 08:20]    Creatinine Trend:  SCr 2.06 [03-03 @ 08:20]  SCr 2.22 [03-02 @ 06:57]  SCr 1.97 [03-01 @ 07:59]  SCr 2.32 [02-28 @ 00:26]  SCr 2.15 [02-27 @ 02:24]    Urinalysis - [03-02-22 @ 11:17]      Color Light Yellow / Appearance Clear / SG 1.021 / pH 6.0      Gluc >= 1000 mg/dL / Ketone Negative  / Bili Negative / Urobili <2 mg/dL       Blood Negative / Protein Negative / Leuk Est Negative / Nitrite Negative      RBC  / WBC  / Hyaline  / Gran  / Sq Epi  / Non Sq Epi  / Bacteria     Urine Creatinine 69      [02-28-22 @ 18:18]  Urine Sodium 52      [02-28-22 @ 18:18]  Urine Urea Nitrogen 687.0      [02-28-22 @ 18:18]    HbA1c 9.4      [05-24-19 @ 23:30]  TSH 1.71      [02-27-22 @ 02:26]  Lipid: chol 113, TG 46, HDL 51, LDL --      [02-27-22 @ 02:26]

## 2022-03-03 NOTE — PROGRESS NOTE ADULT - ATTENDING COMMENTS
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 This can be further evaluated with MRI.  please consult    otherwise we will sign off. reconsult if needed

## 2022-03-03 NOTE — PROGRESS NOTE ADULT - ATTENDING COMMENTS
88 year old man with history of CAD sp CABG 20-25 years ago, multiple PCI thereafter most recently 2019 PCI SVG->OM; PPM, RLE PAD with stents in place. 2/26/22 had syncope and chest pain. 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 IABP removed; no events    TTE: 2/27/22  1. Mitral annular calcification, otherwise normal mitral  valve. Mild-moderate mitral regurgitation.  2. Aortic valve not well visualized; appears calcified.  Peak transaortic valve gradient equals 22 mm Hg, mean  transaortic valve gradient equals 11 mm Hg, consistent with  mild aortic stenosis.  3. Endocardial visualization enhanced with intravenous  injection of echo contrast (Definity). Mild segmental left  ventricular systolic dysfunction with hypokinesis of the  inferolateral wall. Visual estimate of EF about 45%.  *** Compared with echocardiogram of 5/27/2019, no  significant changes noted.  ------------------------------------------------------------------------    Meds:  ASA 81 mg daily  Brilinta 90 mg BID  Coreg 6.25 mg BID  Hydralazine 10 mg TID  Isordil 10 mg TID  Atorvastatin 80 mg daily  Lasix 20 mg daily  SQ heparin    #Neuro- No active issues  #CV- STEMI with PCI to mSVG->OM and POBA proxSVG->OM  IABP pulled   Continue DAPT with ASA/Brilinta/Statin  Continue Coreg  On Hydral/Isordil  #Renal- Follow Cr; lasix 20 mg daily  Continue to monitor  #Endo- Ha1c 8.8 now on LANTUS WITH SLIDING SCALE COVERAGE  #DVT ppx- sq heparin  #dc TO REHAB

## 2022-03-03 NOTE — CONSULT NOTE ADULT - ATTENDING COMMENTS
please request OT evaluation  goal is for acute inpatient rehab, patient can tolerate 3 hours per day of therapy with medical supervision.  discussed with patient and family at bedside, who are agreeable to plan, patient would like to see how he feels after PT session today to confirm he is agreeable to acute rehab. will continue to monitor.
patient with known CKD 1.7-2 stage 3-4   known nephrolithiasis history   hematuria here  MABLE/ ATN sec to hemodynamic insults and contrast ... should plateau soon  fluid management per cardiology   repeat UA   renal US when able
Endocrine team consulted for diabetes. Patient is high risk with high level decision making   Agree with basal/bolus plan as outlined, adjust as plan above   d/w pt and family regarding lifestyle changes with carb controlled diet, monitoring FSBG, exercise

## 2022-03-03 NOTE — CONSULT NOTE ADULT - SUBJECTIVE AND OBJECTIVE BOX
HPI:  This is an 88 year-old man with past medical history of CAD s/p CABG (), PCI stents x 5 (), PVD with stent place to Right Posterior Tibial Artery? (), T2DM, HTN, HLD, CKD, presented with near syncope and chest pain.  Patient's son states he has been feeling generalized fatigue for past few weeks but this morning nearly collapsed when standing and developed chest pressure.  Called his cardiologist, Dr. Corona, who advised he go to ED for evaluation.  Pt continues to report chest discomfort and generalized fatigue.  Was due for dental appt this morning and has not taken asa for few days. 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 Received asa 325mg PO x1 , Brilinta 180mg PO x1 and heparin drip for ACS. Trop 143. Received insulin/dex 50% and mike gluconate IV and lokema  for K+ 5.9. He was taken to cath lab for ongoing chest discomfort.  Patient went urgently to cath lab for sten to SVG to OM1 with IABP placed. (2022 15:39)    Interval History:  Patient had IABP removed. s/p stent. Downgraded from CCU  Patient seen at bedside with son and daughter.  Per patient and son, for past few months, b/l hands have been having difficulty closing hands and worsening stiffness.    Imaging showed:  < from: TTE with Doppler (w/Cont) (22 @ 08:50) >  CONCLUSIONS:  1. Mitral annular calcification, otherwise normal mitral  valve. Mild-moderate mitral regurgitation.  2. Aortic valve not well visualized; appears calcified.  Peak transaortic valve gradient equals 22 mm Hg, mean  transaortic valve gradient equals 11 mm Hg, consistent with  mild aortic stenosis.  3. Endocardial visualization enhanced with intravenous  injection of echo contrast (Definity). Mild segmental left  ventricular systolic dysfunction with hypokinesis of the  inferolateral wall. Visual estimate of EFabout 45%.  *** Compared with echocardiogram of 2019, no  significant changes noted.    < end of copied text >      REVIEW OF SYSTEMS  +fatigue  +exertional dyspnea  +weakness  +numbness and tingling of b/l feet -- long standing  +difficulty closing hands  +b/l hand stiffness    VITALS  T(C): 36.9 (22 @ 06:00), Max: 36.9 (22 @ 22:25)  HR: 82 (22 @ 06:00) (63 - 87)  BP: 150/67 (22 @ 06:00) (113/67 - 150/67)  RR: 18 (22 @ 06:00) (16 - 18)  SpO2: 97% (22 @ 06:00) (97% - 99%)  Wt(kg): --    PAST MEDICAL & SURGICAL HISTORY  Hyperlipemia    Hypertension    Coronary Artery Disease    Diabetes Mellitus Type II    Stented Coronary Artery    Neuropathy    Myocardial infarction    Stroke    History of Cataract Extraction    Hx of CABG    H/O coronary angiogram    S/P coronary artery stent placement        SOCIAL HISTORY - as per documentation/history  Smoking - None  EtOH - None  Drugs - None    FUNCTIONAL HISTORY  Lives with son, son's wife, and grandchildren in a house, 3 ROCIO w/ b.l HR. 1st floor set up w/ bedroom and bathroom. Patient used Cane to ambulate, also has RW and Rollator. Family says someone is there to help . Cooked and was independent with ADLs.    CURRENT FUNCTIONAL STATUS 3/1/22  Bed Mobility: Supine to Sit:     · Level of [place]	maximum assist (25% patients effort)  · Physical Assist/Nonphysical Assist	1 person assist; verbal cues    Bed Mobility Analysis:     · Bed Mobility Limitations	decreased ability to use arms for pushing/pulling  · Impairments Contributing to Impaired Bed Mobility	decreased strength    Transfer: Bed to Chair:     Transfer Skill: Bed to Chair   · Level of [place]	minimum assist (75% patients effort)  · Physical Assist/Nonphysical Assist	verbal cues; 2 person assist  · Weight-Bearing Restrictions	full weight-bearing  · Assistive Device	rolling walker    Transfer: Sit to Stand:     · Level of [place]	moderate assist (50% patients effort)  · Physical Assist/Nonphysical Assist	1 person assist; verbal cues  · Weight-Bearing Restrictions	full weight-bearing  · Assistive Device	rolling walker    Transfer: Stand to Sit:     · Level of [place]	minimum assist (75% patients effort)  · Physical Assist/Nonphysical Assist	1 person assist; verbal cues  · Weight-Bearing Restrictions	full weight-bearing  · Assistive Device	rolling walker    Sit/Stand Transfer Safety Analysis:     · Impairments Contributing to Impaired Transfers	decreased strength    Gait Skills:     · Level of [place]	minimum assist (75% patients effort)  · Physical Assist/Nonphysical Assist	1 person assist; verbal cues  · Weight-Bearing Restrictions	full weight-bearing  · Assistive Device	rolling walker  · Gait Distance	bed to chair        FAMILY HISTORY       RECENT LABS/IMAGING  CBC Full  -  ( 03 Mar 2022 08:20 )  WBC Count : 8.13 K/uL  RBC Count : 4.14 M/uL  Hemoglobin : 12.2 g/dL  Hematocrit : 37.2 %  Platelet Count - Automated : 159 K/uL  Mean Cell Volume : 89.9 fL  Mean Cell Hemoglobin : 29.5 pg  Mean Cell Hemoglobin Concentration : 32.8 gm/dL  Auto Neutrophil # : x  Auto Lymphocyte # : x  Auto Monocyte # : x  Auto Eosinophil # : x  Auto Basophil # : x  Auto Neutrophil % : x  Auto Lymphocyte % : x  Auto Monocyte % : x  Auto Eosinophil % : x  Auto Basophil % : x    03-03    137  |  104  |  45<H>  ----------------------------<  293<H>  4.7   |  19<L>  |  2.06<H>    Ca    8.8      03 Mar 2022 08:20  Phos  3.8     03-03  Mg     2.30     03-03      Urinalysis Basic - ( 02 Mar 2022 11:17 )    Color: Light Yellow / Appearance: Clear / S.021 / pH: x  Gluc: x / Ketone: Negative  / Bili: Negative / Urobili: <2 mg/dL   Blood: x / Protein: Negative / Nitrite: Negative   Leuk Esterase: Negative / RBC: x / WBC x   Sq Epi: x / Non Sq Epi: x / Bacteria: x        ALLERGIES  carbamazepine (Other)  Cipro (Unknown)  fluoroquinolone antibiotics (Other)  Tegretol (Unknown)      MEDICATIONS   aspirin enteric coated 81 milliGRAM(s) Oral daily  atorvastatin 80 milliGRAM(s) Oral at bedtime  carvedilol 6.25 milliGRAM(s) Oral every 12 hours  dextrose 40% Gel 15 Gram(s) Oral once  dextrose 5%. 1000 milliLiter(s) IV Continuous <Continuous>  dextrose 5%. 1000 milliLiter(s) IV Continuous <Continuous>  dextrose 50% Injectable 25 Gram(s) IV Push once  dextrose 50% Injectable 12.5 Gram(s) IV Push once  dextrose 50% Injectable 25 Gram(s) IV Push once  furosemide    Tablet 20 milliGRAM(s) Oral daily  gabapentin 200 milliGRAM(s) Oral two times a day  glucagon  Injectable 1 milliGRAM(s) IntraMuscular once  heparin   Injectable 5000 Unit(s) SubCutaneous every 8 hours  hydrALAZINE 10 milliGRAM(s) Oral three times a day  insulin glargine Injectable (LANTUS) 12 Unit(s) SubCutaneous at bedtime  insulin lispro (ADMELOG) corrective regimen sliding scale   SubCutaneous three times a day before meals  insulin lispro (ADMELOG) corrective regimen sliding scale   SubCutaneous at bedtime  insulin lispro Injectable (ADMELOG) 4 Unit(s) SubCutaneous three times a day before meals  isosorbide   dinitrate Tablet (ISORDIL) 10 milliGRAM(s) Oral three times a day  melatonin 3 milliGRAM(s) Oral at bedtime PRN  pantoprazole    Tablet 40 milliGRAM(s) Oral before breakfast  polyethylene glycol 3350 17 Gram(s) Oral daily  senna 2 Tablet(s) Oral at bedtime  ticagrelor 90 milliGRAM(s) Oral every 12 hours    ----------------------------------------------------------------------------------------  PHYSICAL EXAM  Constitutional - NAD, Comfortable  HEENT - NCAT, EOMI  Neck - No tenderness  Chest - Breathing comfortably, No wheezing  Cardiovascular - S1S2   Abdomen - Soft   Extremities - No C/C/E, No calf tenderness; b/l hands w/ J[place] and [place] nodes at DIP and PIP. Painful DIP and PIP especially at middle finger when fingers are passively flexed.  Neurologic Exam -                    Cognitive - Awake, Alert, AAO to self, place, date, year, situation     Communication - Fluent, No dysarthria     Cranial Nerves - CN 2-12 intact     Motor -                     LEFT    UE - ShAB 4/5, EF 4/5, EE 5/5, WE 5/5,  unable to  due to stiff MCP, DIP and PIP                    RIGHT UE - ShAB 4/5, EF 4/5, EE 5/5, WE 5/5,  unable to  due to stiff MCP, DIP and PIP                    LEFT    LE - HF 4/5, KE 5/5, DF 5/5, PF 5/5                    RIGHT LE - HF 4/5, KE 5/5, DF 5/5, PF 5/5        Sensory - Intact to LT except for b/l feet     Reflexes - DTR Intact, No primitive reflexes     Coordination - FTN intact     Balance - impaired  Psychiatric - Mood stable, Affect WNL  ----------------------------------------------------------------------------------------    ASSESSMENT/PLAN  88y Male PMH CAD s/p CABG (), PCI stents x 5 (), PVD with stent place to Right Posterior Tibial Artery? (), T2DM, HTN, HLD, CKD, with functional deficits after STEMI s/p stent and IABP.  - cardiac and fall precautions  - continue bedside PT, recommend starting OT especially given functional deficits with hand  and ADLS  Pain - gabapentin for Diabetic neuropathy  DVT PPX - SCDs, HSQ  Rehab -    HPI:  This is an 88 year-old man with past medical history of CAD s/p CABG (), PCI stents x 5 (), PVD with stent place to Right Posterior Tibial Artery? (), T2DM, HTN, HLD, CKD, presented with near syncope and chest pain.  Patient's son states he has been feeling generalized fatigue for past few weeks but this morning nearly collapsed when standing and developed chest pressure.  Called his cardiologist, Dr. Corona, who advised he go to ED for evaluation.  Pt continues to report chest discomfort and generalized fatigue.  Was due for dental appt this morning and has not taken asa for few days. 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 Received asa 325mg PO x1 , Brilinta 180mg PO x1 and heparin drip for ACS. Trop 143. Received insulin/dex 50% and mike gluconate IV and lokema  for K+ 5.9. He was taken to cath lab for ongoing chest discomfort.  Patient went urgently to cath lab for sten to SVG to OM1 with IABP placed. (2022 15:39)    Interval History:  Patient had IABP removed. s/p stent. Downgraded from CCU  Patient seen at bedside with son and daughter.  Per patient and son, for past few months, b/l hands have been having difficulty closing hands and worsening stiffness.    Imaging showed:  < from: TTE with Doppler (w/Cont) (22 @ 08:50) >  CONCLUSIONS:  1. Mitral annular calcification, otherwise normal mitral  valve. Mild-moderate mitral regurgitation.  2. Aortic valve not well visualized; appears calcified.  Peak transaortic valve gradient equals 22 mm Hg, mean  transaortic valve gradient equals 11 mm Hg, consistent with  mild aortic stenosis.  3. Endocardial visualization enhanced with intravenous  injection of echo contrast (Definity). Mild segmental left  ventricular systolic dysfunction with hypokinesis of the  inferolateral wall. Visual estimate of EFabout 45%.  *** Compared with echocardiogram of 2019, no  significant changes noted.    < end of copied text >      REVIEW OF SYSTEMS  +fatigue  +exertional dyspnea  +weakness  +numbness and tingling of b/l feet -- long standing  +difficulty closing hands  +b/l hand stiffness    VITALS  T(C): 36.9 (22 @ 06:00), Max: 36.9 (22 @ 22:25)  HR: 82 (22 @ 06:00) (63 - 87)  BP: 150/67 (22 @ 06:00) (113/67 - 150/67)  RR: 18 (22 @ 06:00) (16 - 18)  SpO2: 97% (22 @ 06:00) (97% - 99%)  Wt(kg): --    PAST MEDICAL & SURGICAL HISTORY  Hyperlipemia    Hypertension    Coronary Artery Disease    Diabetes Mellitus Type II    Stented Coronary Artery    Neuropathy    Myocardial infarction    Stroke    History of Cataract Extraction    Hx of CABG    H/O coronary angiogram    S/P coronary artery stent placement        SOCIAL HISTORY - as per documentation/history  Smoking - None  EtOH - None  Drugs - None    FUNCTIONAL HISTORY  Lives with son, son's wife, and grandchildren in a house, 3 ROCIO w/ b.l HR. 1st floor set up w/ bedroom and bathroom. Patient used Cane to ambulate, also has RW and Rollator. Family says someone is there to help . Cooked and was independent with ADLs.    CURRENT FUNCTIONAL STATUS 3/1/22  Bed Mobility: Supine to Sit:     · Level of [place]	maximum assist (25% patients effort)  · Physical Assist/Nonphysical Assist	1 person assist; verbal cues    Bed Mobility Analysis:     · Bed Mobility Limitations	decreased ability to use arms for pushing/pulling  · Impairments Contributing to Impaired Bed Mobility	decreased strength    Transfer: Bed to Chair:     Transfer Skill: Bed to Chair   · Level of [place]	minimum assist (75% patients effort)  · Physical Assist/Nonphysical Assist	verbal cues; 2 person assist  · Weight-Bearing Restrictions	full weight-bearing  · Assistive Device	rolling walker    Transfer: Sit to Stand:     · Level of [place]	moderate assist (50% patients effort)  · Physical Assist/Nonphysical Assist	1 person assist; verbal cues  · Weight-Bearing Restrictions	full weight-bearing  · Assistive Device	rolling walker    Transfer: Stand to Sit:     · Level of [place]	minimum assist (75% patients effort)  · Physical Assist/Nonphysical Assist	1 person assist; verbal cues  · Weight-Bearing Restrictions	full weight-bearing  · Assistive Device	rolling walker    Sit/Stand Transfer Safety Analysis:     · Impairments Contributing to Impaired Transfers	decreased strength    Gait Skills:     · Level of [place]	minimum assist (75% patients effort)  · Physical Assist/Nonphysical Assist	1 person assist; verbal cues  · Weight-Bearing Restrictions	full weight-bearing  · Assistive Device	rolling walker  · Gait Distance	bed to chair        FAMILY HISTORY       RECENT LABS/IMAGING  CBC Full  -  ( 03 Mar 2022 08:20 )  WBC Count : 8.13 K/uL  RBC Count : 4.14 M/uL  Hemoglobin : 12.2 g/dL  Hematocrit : 37.2 %  Platelet Count - Automated : 159 K/uL  Mean Cell Volume : 89.9 fL  Mean Cell Hemoglobin : 29.5 pg  Mean Cell Hemoglobin Concentration : 32.8 gm/dL  Auto Neutrophil # : x  Auto Lymphocyte # : x  Auto Monocyte # : x  Auto Eosinophil # : x  Auto Basophil # : x  Auto Neutrophil % : x  Auto Lymphocyte % : x  Auto Monocyte % : x  Auto Eosinophil % : x  Auto Basophil % : x    03-03    137  |  104  |  45<H>  ----------------------------<  293<H>  4.7   |  19<L>  |  2.06<H>    Ca    8.8      03 Mar 2022 08:20  Phos  3.8     03-03  Mg     2.30     03-03      Urinalysis Basic - ( 02 Mar 2022 11:17 )    Color: Light Yellow / Appearance: Clear / S.021 / pH: x  Gluc: x / Ketone: Negative  / Bili: Negative / Urobili: <2 mg/dL   Blood: x / Protein: Negative / Nitrite: Negative   Leuk Esterase: Negative / RBC: x / WBC x   Sq Epi: x / Non Sq Epi: x / Bacteria: x        ALLERGIES  carbamazepine (Other)  Cipro (Unknown)  fluoroquinolone antibiotics (Other)  Tegretol (Unknown)      MEDICATIONS   aspirin enteric coated 81 milliGRAM(s) Oral daily  atorvastatin 80 milliGRAM(s) Oral at bedtime  carvedilol 6.25 milliGRAM(s) Oral every 12 hours  dextrose 40% Gel 15 Gram(s) Oral once  dextrose 5%. 1000 milliLiter(s) IV Continuous <Continuous>  dextrose 5%. 1000 milliLiter(s) IV Continuous <Continuous>  dextrose 50% Injectable 25 Gram(s) IV Push once  dextrose 50% Injectable 12.5 Gram(s) IV Push once  dextrose 50% Injectable 25 Gram(s) IV Push once  furosemide    Tablet 20 milliGRAM(s) Oral daily  gabapentin 200 milliGRAM(s) Oral two times a day  glucagon  Injectable 1 milliGRAM(s) IntraMuscular once  heparin   Injectable 5000 Unit(s) SubCutaneous every 8 hours  hydrALAZINE 10 milliGRAM(s) Oral three times a day  insulin glargine Injectable (LANTUS) 12 Unit(s) SubCutaneous at bedtime  insulin lispro (ADMELOG) corrective regimen sliding scale   SubCutaneous three times a day before meals  insulin lispro (ADMELOG) corrective regimen sliding scale   SubCutaneous at bedtime  insulin lispro Injectable (ADMELOG) 4 Unit(s) SubCutaneous three times a day before meals  isosorbide   dinitrate Tablet (ISORDIL) 10 milliGRAM(s) Oral three times a day  melatonin 3 milliGRAM(s) Oral at bedtime PRN  pantoprazole    Tablet 40 milliGRAM(s) Oral before breakfast  polyethylene glycol 3350 17 Gram(s) Oral daily  senna 2 Tablet(s) Oral at bedtime  ticagrelor 90 milliGRAM(s) Oral every 12 hours    ----------------------------------------------------------------------------------------  PHYSICAL EXAM  Constitutional - NAD, Comfortable  HEENT - NCAT, EOMI  Neck - No tenderness  Chest - Breathing comfortably, No wheezing  Cardiovascular - S1S2   Abdomen - Soft   Extremities - No C/C/E, No calf tenderness; b/l hands w/ [place] and [place] nodes at DIP and PIP. Painful DIP and PIP especially at middle finger when fingers are passively flexed.  Neurologic Exam -                    Cognitive - Awake, Alert, AAO to self, place, date, year, situation     Communication - Fluent, No dysarthria     Cranial Nerves - CN 2-12 intact     Motor -                     LEFT    UE - ShAB 4/5, EF 4/5, EE 5/5, WE 5/5,  unable to  due to stiff MCP, DIP and PIP                    RIGHT UE - ShAB 4/5, EF 4/5, EE 5/5, WE 5/5,  unable to  due to stiff MCP, DIP and PIP                    LEFT    LE - HF 4/5, KE 5/5, DF 5/5, PF 5/5                    RIGHT LE - HF 4/5, KE 5/5, DF 5/5, PF 5/5        Sensory - Intact to LT except for b/l feet     Reflexes - DTR Intact, No primitive reflexes     Coordination - FTN intact     Balance - impaired  Psychiatric - Mood stable, Affect WNL  ----------------------------------------------------------------------------------------    ASSESSMENT/PLAN  88y Male PMH CAD s/p CABG (), PCI stents x 5 (), PVD with stent place to Right Posterior Tibial Artery? (), T2DM, HTN, HLD, CKD, with functional deficits after STEMI s/p stent and IABP.  - cardiac and fall precautions  - continue bedside PT, recommend starting OT especially given functional deficits with hand  and ADLS  Pain - gabapentin for Diabetic neuropathy  DVT PPX - SCDs, HSQ  Rehab -    Recommend ACUTE inpatient rehabilitation for the functional deficits consisting of 3 hours of therapy/day & 24 hour RN/daily PMR physician for comorbid medical management. Will continue to follow for ongoing rehab needs and recommendations. Patient will be able to tolerate 3 hours a day. HPI:  This is an 88 year-old man with past medical history of CAD s/p CABG (), PCI stents x 5 (), PVD with stent place to Right Posterior Tibial Artery? (), T2DM, HTN, HLD, CKD, presented with near syncope and chest pain.  Patient's son states he has been feeling generalized fatigue for past few weeks but this morning nearly collapsed when standing and developed chest pressure.  Called his cardiologist, Dr. Corona, who advised he go to ED for evaluation.  Pt continues to report chest discomfort and generalized fatigue.  Was due for dental appt this morning and has not taken asa for few days. 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 Received asa 325mg PO x1 , Brilinta 180mg PO x1 and heparin drip for ACS. Trop 143. Received insulin/dex 50% and mike gluconate IV and lokema  for K+ 5.9. He was taken to cath lab for ongoing chest discomfort.  Patient went urgently to cath lab for sten to SVG to OM1 with IABP placed. (2022 15:39)    Interval History:  Patient had IABP removed. s/p stent. Downgraded from CCU  Patient seen at bedside with son and daughter.  Per patient and son, for past few months, b/l hands have been having difficulty closing hands and worsening stiffness.    Imaging showed:  < from: TTE with Doppler (w/Cont) (22 @ 08:50) >  CONCLUSIONS:  1. Mitral annular calcification, otherwise normal mitral  valve. Mild-moderate mitral regurgitation.  2. Aortic valve not well visualized; appears calcified.  Peak transaortic valve gradient equals 22 mm Hg, mean  transaortic valve gradient equals 11 mm Hg, consistent with  mild aortic stenosis.  3. Endocardial visualization enhanced with intravenous  injection of echo contrast (Definity). Mild segmental left  ventricular systolic dysfunction with hypokinesis of the  inferolateral wall. Visual estimate of EFabout 45%.  *** Compared with echocardiogram of 2019, no  significant changes noted.    < end of copied text >      REVIEW OF SYSTEMS  +fatigue  +exertional dyspnea  +weakness  +numbness and tingling of b/l feet -- long standing  +difficulty closing hands  +b/l hand stiffness    VITALS  T(C): 36.9 (22 @ 06:00), Max: 36.9 (22 @ 22:25)  HR: 82 (22 @ 06:00) (63 - 87)  BP: 150/67 (22 @ 06:00) (113/67 - 150/67)  RR: 18 (22 @ 06:00) (16 - 18)  SpO2: 97% (22 @ 06:00) (97% - 99%)  Wt(kg): --    PAST MEDICAL & SURGICAL HISTORY  Hyperlipemia    Hypertension    Coronary Artery Disease    Diabetes Mellitus Type II    Stented Coronary Artery    Neuropathy    Myocardial infarction    Stroke    History of Cataract Extraction    Hx of CABG    H/O coronary angiogram    S/P coronary artery stent placement        SOCIAL HISTORY - as per documentation/history  Smoking - None  EtOH - None  Drugs - None    FUNCTIONAL HISTORY  Lives with son, son's wife, and grandchildren in a house, 3 ROCIO w/ b.l HR. 1st floor set up w/ bedroom and bathroom. Patient used Cane to ambulate, also has RW and Rollator. Family says someone is there to help . Cooked and was independent with ADLs.    CURRENT FUNCTIONAL STATUS 3/1/22  Bed Mobility: Supine to Sit:     · Level of [place]	maximum assist (25% patients effort)  · Physical Assist/Nonphysical Assist	1 person assist; verbal cues    Bed Mobility Analysis:     · Bed Mobility Limitations	decreased ability to use arms for pushing/pulling  · Impairments Contributing to Impaired Bed Mobility	decreased strength    Transfer: Bed to Chair:     Transfer Skill: Bed to Chair   · Level of [place]	minimum assist (75% patients effort)  · Physical Assist/Nonphysical Assist	verbal cues; 2 person assist  · Weight-Bearing Restrictions	full weight-bearing  · Assistive Device	rolling walker    Transfer: Sit to Stand:     · Level of [place]	moderate assist (50% patients effort)  · Physical Assist/Nonphysical Assist	1 person assist; verbal cues  · Weight-Bearing Restrictions	full weight-bearing  · Assistive Device	rolling walker    Transfer: Stand to Sit:     · Level of [place]	minimum assist (75% patients effort)  · Physical Assist/Nonphysical Assist	1 person assist; verbal cues  · Weight-Bearing Restrictions	full weight-bearing  · Assistive Device	rolling walker    Sit/Stand Transfer Safety Analysis:     · Impairments Contributing to Impaired Transfers	decreased strength    Gait Skills:     · Level of [place]	minimum assist (75% patients effort)  · Physical Assist/Nonphysical Assist	1 person assist; verbal cues  · Weight-Bearing Restrictions	full weight-bearing  · Assistive Device	rolling walker  · Gait Distance	bed to chair       RECENT LABS/IMAGING  CBC Full  -  ( 03 Mar 2022 08:20 )  WBC Count : 8.13 K/uL  RBC Count : 4.14 M/uL  Hemoglobin : 12.2 g/dL  Hematocrit : 37.2 %  Platelet Count - Automated : 159 K/uL  Mean Cell Volume : 89.9 fL  Mean Cell Hemoglobin : 29.5 pg  Mean Cell Hemoglobin Concentration : 32.8 gm/dL  Auto Neutrophil # : x  Auto Lymphocyte # : x  Auto Monocyte # : x  Auto Eosinophil # : x  Auto Basophil # : x  Auto Neutrophil % : x  Auto Lymphocyte % : x  Auto Monocyte % : x  Auto Eosinophil % : x  Auto Basophil % : x    03-03    137  |  104  |  45<H>  ----------------------------<  293<H>  4.7   |  19<L>  |  2.06<H>    Ca    8.8      03 Mar 2022 08:20  Phos  3.8     -  Mg     2.30     -03      Urinalysis Basic - ( 02 Mar 2022 11:17 )    Color: Light Yellow / Appearance: Clear / S.021 / pH: x  Gluc: x / Ketone: Negative  / Bili: Negative / Urobili: <2 mg/dL   Blood: x / Protein: Negative / Nitrite: Negative   Leuk Esterase: Negative / RBC: x / WBC x   Sq Epi: x / Non Sq Epi: x / Bacteria: x        ALLERGIES  carbamazepine (Other)  Cipro (Unknown)  fluoroquinolone antibiotics (Other)  Tegretol (Unknown)      MEDICATIONS   aspirin enteric coated 81 milliGRAM(s) Oral daily  atorvastatin 80 milliGRAM(s) Oral at bedtime  carvedilol 6.25 milliGRAM(s) Oral every 12 hours  dextrose 40% Gel 15 Gram(s) Oral once  dextrose 5%. 1000 milliLiter(s) IV Continuous <Continuous>  dextrose 5%. 1000 milliLiter(s) IV Continuous <Continuous>  dextrose 50% Injectable 25 Gram(s) IV Push once  dextrose 50% Injectable 12.5 Gram(s) IV Push once  dextrose 50% Injectable 25 Gram(s) IV Push once  furosemide    Tablet 20 milliGRAM(s) Oral daily  gabapentin 200 milliGRAM(s) Oral two times a day  glucagon  Injectable 1 milliGRAM(s) IntraMuscular once  heparin   Injectable 5000 Unit(s) SubCutaneous every 8 hours  hydrALAZINE 10 milliGRAM(s) Oral three times a day  insulin glargine Injectable (LANTUS) 12 Unit(s) SubCutaneous at bedtime  insulin lispro (ADMELOG) corrective regimen sliding scale   SubCutaneous three times a day before meals  insulin lispro (ADMELOG) corrective regimen sliding scale   SubCutaneous at bedtime  insulin lispro Injectable (ADMELOG) 4 Unit(s) SubCutaneous three times a day before meals  isosorbide   dinitrate Tablet (ISORDIL) 10 milliGRAM(s) Oral three times a day  melatonin 3 milliGRAM(s) Oral at bedtime PRN  pantoprazole    Tablet 40 milliGRAM(s) Oral before breakfast  polyethylene glycol 3350 17 Gram(s) Oral daily  senna 2 Tablet(s) Oral at bedtime  ticagrelor 90 milliGRAM(s) Oral every 12 hours    ----------------------------------------------------------------------------------------  PHYSICAL EXAM  Constitutional - NAD, Comfortable  HEENT - NCAT, EOMI  Neck - No tenderness  Chest - Breathing comfortably, No wheezing  Cardiovascular - S1S2   Abdomen - Soft   Extremities - No C/C/E, No calf tenderness; b/l hands w/ Jazmín and [place] nodes at DIP and PIP. Painful DIP and PIP especially at middle finger when fingers are passively flexed.  Neurologic Exam -                    Cognitive - Awake, Alert, AAO to self, place, date, year, situation     Communication - Fluent, No dysarthria     Cranial Nerves - CN 2-12 intact     Motor -                     LEFT    UE - ShAB 4/5, EF 4/5, EE 5/5, WE 5/5,  unable to  due to stiff MCP, DIP and PIP                    RIGHT UE - ShAB 4/5, EF 4/5, EE 5/5, WE 5/5,  unable to  due to stiff MCP, DIP and PIP                    LEFT    LE - HF 4/5, KE 5/5, DF 5/5, PF 5/5                    RIGHT LE - HF 4/5, KE 5/5, DF 5/5, PF 5/5        Sensory - Intact to LT except for b/l feet     Reflexes - DTR Intact, No primitive reflexes     Coordination - FTN intact     Balance - impaired  Psychiatric - Mood stable, Affect WNL  ----------------------------------------------------------------------------------------    ASSESSMENT/PLAN  88y Male PMH CAD s/p CABG (), PCI stents x 5 (), PVD with stent place to Right Posterior Tibial Artery? (), T2DM, HTN, HLD, CKD, with functional deficits after STEMI s/p stent and IABP.  - cardiac and fall precautions  - continue bedside PT, recommend starting OT especially given functional deficits with hand  and ADLS  Pain - gabapentin for Diabetic neuropathy  DVT PPX - SCDs, HSQ  Rehab -    Recommend ACUTE inpatient rehabilitation for the functional deficits consisting of 3 hours of therapy/day & 24 hour RN/daily PMR physician for comorbid medical management. Will continue to follow for ongoing rehab needs and recommendations. Patient will be able to tolerate 3 hours a day.

## 2022-03-04 LAB
ANION GAP SERPL CALC-SCNC: 11 MMOL/L — SIGNIFICANT CHANGE UP (ref 7–14)
BUN SERPL-MCNC: 40 MG/DL — HIGH (ref 7–23)
CALCIUM SERPL-MCNC: 8.7 MG/DL — SIGNIFICANT CHANGE UP (ref 8.4–10.5)
CHLORIDE SERPL-SCNC: 105 MMOL/L — SIGNIFICANT CHANGE UP (ref 98–107)
CO2 SERPL-SCNC: 24 MMOL/L — SIGNIFICANT CHANGE UP (ref 22–31)
CREAT SERPL-MCNC: 1.76 MG/DL — HIGH (ref 0.5–1.3)
EGFR: 37 ML/MIN/1.73M2 — LOW
GLUCOSE BLDC GLUCOMTR-MCNC: 177 MG/DL — HIGH (ref 70–99)
GLUCOSE BLDC GLUCOMTR-MCNC: 236 MG/DL — HIGH (ref 70–99)
GLUCOSE BLDC GLUCOMTR-MCNC: 303 MG/DL — HIGH (ref 70–99)
GLUCOSE BLDC GLUCOMTR-MCNC: 313 MG/DL — HIGH (ref 70–99)
GLUCOSE SERPL-MCNC: 161 MG/DL — HIGH (ref 70–99)
HCT VFR BLD CALC: 39.4 % — SIGNIFICANT CHANGE UP (ref 39–50)
HGB BLD-MCNC: 12.9 G/DL — LOW (ref 13–17)
MAGNESIUM SERPL-MCNC: 2.3 MG/DL — SIGNIFICANT CHANGE UP (ref 1.6–2.6)
MCHC RBC-ENTMCNC: 30.1 PG — SIGNIFICANT CHANGE UP (ref 27–34)
MCHC RBC-ENTMCNC: 32.7 GM/DL — SIGNIFICANT CHANGE UP (ref 32–36)
MCV RBC AUTO: 92.1 FL — SIGNIFICANT CHANGE UP (ref 80–100)
NRBC # BLD: 0 /100 WBCS — SIGNIFICANT CHANGE UP
NRBC # FLD: 0 K/UL — SIGNIFICANT CHANGE UP
PHOSPHATE SERPL-MCNC: 4 MG/DL — SIGNIFICANT CHANGE UP (ref 2.5–4.5)
PLATELET # BLD AUTO: 169 K/UL — SIGNIFICANT CHANGE UP (ref 150–400)
POTASSIUM SERPL-MCNC: 4.4 MMOL/L — SIGNIFICANT CHANGE UP (ref 3.5–5.3)
POTASSIUM SERPL-SCNC: 4.4 MMOL/L — SIGNIFICANT CHANGE UP (ref 3.5–5.3)
RBC # BLD: 4.28 M/UL — SIGNIFICANT CHANGE UP (ref 4.2–5.8)
RBC # FLD: 14.5 % — SIGNIFICANT CHANGE UP (ref 10.3–14.5)
SODIUM SERPL-SCNC: 140 MMOL/L — SIGNIFICANT CHANGE UP (ref 135–145)
WBC # BLD: 8.66 K/UL — SIGNIFICANT CHANGE UP (ref 3.8–10.5)
WBC # FLD AUTO: 8.66 K/UL — SIGNIFICANT CHANGE UP (ref 3.8–10.5)

## 2022-03-04 PROCEDURE — 99232 SBSQ HOSP IP/OBS MODERATE 35: CPT

## 2022-03-04 RX ORDER — ACETAMINOPHEN 500 MG
650 TABLET ORAL EVERY 6 HOURS
Refills: 0 | Status: DISCONTINUED | OUTPATIENT
Start: 2022-03-04 | End: 2022-03-05

## 2022-03-04 RX ORDER — INSULIN GLARGINE 100 [IU]/ML
18 INJECTION, SOLUTION SUBCUTANEOUS AT BEDTIME
Refills: 0 | Status: DISCONTINUED | OUTPATIENT
Start: 2022-03-04 | End: 2022-03-05

## 2022-03-04 RX ADMIN — Medication 650 MILLIGRAM(S): at 22:32

## 2022-03-04 RX ADMIN — Medication 10 MILLIGRAM(S): at 22:35

## 2022-03-04 RX ADMIN — HEPARIN SODIUM 5000 UNIT(S): 5000 INJECTION INTRAVENOUS; SUBCUTANEOUS at 14:28

## 2022-03-04 RX ADMIN — Medication 10 MILLIGRAM(S): at 05:30

## 2022-03-04 RX ADMIN — ISOSORBIDE DINITRATE 10 MILLIGRAM(S): 5 TABLET ORAL at 05:30

## 2022-03-04 RX ADMIN — ATORVASTATIN CALCIUM 80 MILLIGRAM(S): 80 TABLET, FILM COATED ORAL at 22:33

## 2022-03-04 RX ADMIN — Medication 2: at 13:12

## 2022-03-04 RX ADMIN — CARVEDILOL PHOSPHATE 6.25 MILLIGRAM(S): 80 CAPSULE, EXTENDED RELEASE ORAL at 05:30

## 2022-03-04 RX ADMIN — Medication 5 MILLIGRAM(S): at 11:56

## 2022-03-04 RX ADMIN — Medication 6 UNIT(S): at 09:14

## 2022-03-04 RX ADMIN — Medication 81 MILLIGRAM(S): at 11:56

## 2022-03-04 RX ADMIN — TICAGRELOR 90 MILLIGRAM(S): 90 TABLET ORAL at 17:27

## 2022-03-04 RX ADMIN — HEPARIN SODIUM 5000 UNIT(S): 5000 INJECTION INTRAVENOUS; SUBCUTANEOUS at 22:47

## 2022-03-04 RX ADMIN — SENNA PLUS 2 TABLET(S): 8.6 TABLET ORAL at 22:33

## 2022-03-04 RX ADMIN — Medication 2: at 22:48

## 2022-03-04 RX ADMIN — Medication 1: at 18:29

## 2022-03-04 RX ADMIN — ISOSORBIDE DINITRATE 10 MILLIGRAM(S): 5 TABLET ORAL at 14:27

## 2022-03-04 RX ADMIN — CARVEDILOL PHOSPHATE 6.25 MILLIGRAM(S): 80 CAPSULE, EXTENDED RELEASE ORAL at 17:28

## 2022-03-04 RX ADMIN — TICAGRELOR 90 MILLIGRAM(S): 90 TABLET ORAL at 05:29

## 2022-03-04 RX ADMIN — HEPARIN SODIUM 5000 UNIT(S): 5000 INJECTION INTRAVENOUS; SUBCUTANEOUS at 05:30

## 2022-03-04 RX ADMIN — Medication 2: at 09:13

## 2022-03-04 RX ADMIN — INSULIN GLARGINE 18 UNIT(S): 100 INJECTION, SOLUTION SUBCUTANEOUS at 22:47

## 2022-03-04 RX ADMIN — ISOSORBIDE DINITRATE 10 MILLIGRAM(S): 5 TABLET ORAL at 22:36

## 2022-03-04 RX ADMIN — GABAPENTIN 200 MILLIGRAM(S): 400 CAPSULE ORAL at 05:29

## 2022-03-04 RX ADMIN — Medication 10 MILLIGRAM(S): at 14:28

## 2022-03-04 RX ADMIN — GABAPENTIN 200 MILLIGRAM(S): 400 CAPSULE ORAL at 17:27

## 2022-03-04 RX ADMIN — Medication 6 UNIT(S): at 17:45

## 2022-03-04 RX ADMIN — Medication 650 MILLIGRAM(S): at 23:32

## 2022-03-04 RX ADMIN — Medication 6 UNIT(S): at 13:12

## 2022-03-04 RX ADMIN — Medication 20 MILLIGRAM(S): at 05:30

## 2022-03-04 RX ADMIN — POLYETHYLENE GLYCOL 3350 17 GRAM(S): 17 POWDER, FOR SOLUTION ORAL at 14:27

## 2022-03-04 NOTE — PROGRESS NOTE ADULT - PROBLEM SELECTOR PROBLEM 1
Acute kidney injury superimposed on CKD
DM (diabetes mellitus), type 2
NSTEMI (non-ST elevation myocardial infarction)
Acute kidney injury superimposed on CKD
DM (diabetes mellitus), type 2
Acute kidney injury superimposed on CKD
NSTEMI (non-ST elevation myocardial infarction)
NSTEMI (non-ST elevation myocardial infarction)

## 2022-03-04 NOTE — PROGRESS NOTE ADULT - PROBLEM SELECTOR PLAN 4
Continue Coreg, isordil, hydralazine, lasix.
Continue Coreg, isordil, hydralazine, lasix.
Continue ASA, Brillenta, Coreg, isordil, hydralazine, lasix.

## 2022-03-04 NOTE — OCCUPATIONAL THERAPY INITIAL EVALUATION ADULT - DIAGNOSIS, OT EVAL
Mild pulmonary vascular congestive changes; Hypoechoic focus/possible lesion arising from upper pole of left kidney; Decreased Bilateral hand coordination; Decreased standing balance; Decreased functional mobility; Decreased ADL management

## 2022-03-04 NOTE — PROGRESS NOTE ADULT - PROBLEM SELECTOR PROBLEM 4
Acute systolic congestive heart failure

## 2022-03-04 NOTE — PROGRESS NOTE ADULT - PROBLEM SELECTOR PLAN 6
Now resolved, likely due to dose of xanax +/- delirium as patient did not sleep   Will avoid benzos.    # Constipation   - Cont senna, Miralax   - Ducolax x 1 today
Likely due to dose of xanax given to him for anxiety.  Will avoid benzos.  Monitor mental status.
Likely due to dose of xanax given to him for anxiety.  Will avoid benzos.  Monitor mental status.

## 2022-03-04 NOTE — PROGRESS NOTE ADULT - REASON FOR ADMISSION
Angina pectoralis
NSTEMI
Angina pectoralis

## 2022-03-04 NOTE — OCCUPATIONAL THERAPY INITIAL EVALUATION ADULT - MD ORDER
Occupational Therapy (OT) to evaluate and treat. Per ANNY Page, pt is okay to participate in OT evaluation and perform activity as tolerated.

## 2022-03-04 NOTE — PROGRESS NOTE ADULT - PROBLEM SELECTOR PROBLEM 2
Hypertension
Hypertension
DM (diabetes mellitus), type 2

## 2022-03-04 NOTE — PROGRESS NOTE ADULT - PROBLEM SELECTOR PLAN 3
Lesion seen on renal U/S, discussed with Urology- can follow up OP for MRI and w/ Urology   [ ] OP Urology follow up
Lesion seen on renal U/S.    Discussed with patient and family - will follow up outpatient for management.
Lesion seen on renal U/S.    Discussed with patient and family - will follow up outpatient for management.

## 2022-03-04 NOTE — PROGRESS NOTE ADULT - PROBLEM SELECTOR PLAN 5
Cr improving, continue to monitor   Avoid nephrotoxic agents.
Renal function likely at baseline.  Avoid nephrotoxic agents.  Monitor BMP.  Monitor while on diuretics.
Renal function likely at baseline.  Avoid nephrotoxic agents.  Monitor BMP.  Monitor while on diuretics.

## 2022-03-04 NOTE — PROGRESS NOTE ADULT - SUBJECTIVE AND OBJECTIVE BOX
Patient is a 88y old  Male who presents with a chief complaint of Angina pectoralis (04 Mar 2022 10:29)      HPI:  This is an 88 year-old man with past medical history of CAD s/p CABG (1999), PCI stents x 5 (2019), PVD with stent place to Right Posterior Tibial Artery? (2021), T2DM, HTN, HLD, CKD, presented with near syncope and chest pain.  Patient's son states he has been feeling generalized fatigue for past few weeks but this morning nearly collapsed when standing and developed chest pressure.  Called his cardiologist, Dr. Corona, who advised he go to ED for evaluation.  Pt continues to report chest discomfort and generalized fatigue.  Was due for dental appt this morning and has not taken asa for few days. 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 Received asa 325mg PO x1 , Brilinta 180mg PO x1 and heparin drip for ACS. Trop 143. Received insulin/dex 50% and mike gluconate IV and lokema  for K+ 5.9. He was taken to cath lab for ongoing chest discomfort.  Patient went urgently to cath lab for sten to SVG to OM1 with IABP placed. (26 Feb 2022 15:39)    plan for outpatient MRI for kidney lesion.     REVIEW OF SYSTEMS  Constitutional - No fever, No weight loss, No fatigue  HEENT - No eye pain, No visual disturbances, No difficulty hearing, No tinnitus, No vertigo, No neck pain  Respiratory - No cough, No wheezing, No shortness of breath  Cardiovascular - No chest pain, No palpitations  Gastrointestinal - No abdominal pain, No nausea, No vomiting, No diarrhea, + constipation  Genitourinary - No dysuria, No frequency, No hematuria, No incontinence  Neurological - No headaches, No memory loss, + loss of strength, No numbness, No tremors  Skin - No itching, No rashes, No lesions   Endocrine - No temperature intolerance  Musculoskeletal - No joint pain, No joint swelling, No muscle pain  Psychiatric - No depression, No anxiety    PAST MEDICAL & SURGICAL HISTORY  Hyperlipemia    Hypertension    Coronary Artery Disease    Diabetes Mellitus Type II    Stented Coronary Artery    Neuropathy    Myocardial infarction    Stroke    History of Cataract Extraction    Hx of CABG    H/O coronary angiogram    S/P coronary artery stent placement       CURRENT FUNCTIONAL STATUS  3/4 OT  Transfer: Sit to Stand:     · Level of [place]	minimum assist (75% patients effort)  · Physical Assist/Nonphysical Assist	set-up required; verbal cues; 1 person assist; nonverbal cues (demo/gestures)  · Weight-Bearing Restrictions	weight-bearing as tolerated  · Assistive Device	hand held assistance    Transfer: Stand to Sit:     · Level of [place]	minimum assist (75% patients effort)  · Physical Assist/Nonphysical Assist	set-up required; verbal cues; nonverbal cues (demo/gestures); 1 person assist  · Weight-Bearing Restrictions	weight-bearing as tolerated  · Assistive Device	hand held assistance    Sit/Stand Transfer Safety Analysis:     · Transfer Safety Concerns Noted	decreased weight-shifting ability  · Impairments Contributing to Impaired Transfers	decreased strength; decreased ROM; impaired balance    Balance Skills Assessment:     · Sitting Balance: Static	fair plus  · Sitting Balance: Dynamic	fair balance  · Standing Balance: Static	fair balance  · Standing Balance: Dynamic	fair minus    Sensory Examination:     Light Touch Sensation:   · Left UE	within normal limits  · Right UE	within normal limits  · Left LE	within normal limits  however, pt noted mild numbness/ tingling at times  · Right LE	within normal limits  however, pt noted mild numbness/ tingling at times      Visual Assessment:    Visual Assessment:    Visual Assessment:   · Visual Neglect	not observed    Fine Motor Coordination:     Fine Motor Coordination:   · Left Hand, Manipulation of Objects	mild impairment  · Right Hand, Manipulation of Objects	mild impairment    Upper Body Dressing Training:     · Level of [place]	contact guard  · Physical Assist/Nonphysical Assist	set-up required; verbal cues; 1 person assist    Lower Body Dressing Training:     · Level of [place]	moderate assist (50% patients effort)  · Physical Assist/Nonphysical Assist	set-up required; verbal cues; 1 person assist           RECENT LABS/IMAGING  CBC Full  -  ( 04 Mar 2022 08:17 )  WBC Count : 8.66 K/uL  RBC Count : 4.28 M/uL  Hemoglobin : 12.9 g/dL  Hematocrit : 39.4 %  Platelet Count - Automated : 169 K/uL  Mean Cell Volume : 92.1 fL  Mean Cell Hemoglobin : 30.1 pg  Mean Cell Hemoglobin Concentration : 32.7 gm/dL  Auto Neutrophil # : x  Auto Lymphocyte # : x  Auto Monocyte # : x  Auto Eosinophil # : x  Auto Basophil # : x  Auto Neutrophil % : x  Auto Lymphocyte % : x  Auto Monocyte % : x  Auto Eosinophil % : x  Auto Basophil % : x    03-04    140  |  105  |  40<H>  ----------------------------<  161<H>  4.4   |  24  |  1.76<H>    Ca    8.7      04 Mar 2022 08:17  Phos  4.0     03-04  Mg     2.30     03-04          VITALS  T(C): 36.2 (03-04-22 @ 12:28), Max: 36.8 (03-03-22 @ 17:46)  HR: 72 (03-04-22 @ 12:28) (62 - 83)  BP: 139/56 (03-04-22 @ 12:28) (103/54 - 153/63)  RR: 14 (03-04-22 @ 12:28) (14 - 18)  SpO2: 100% (03-04-22 @ 12:28) (96% - 100%)  Wt(kg): --    ALLERGIES  carbamazepine (Other)  Cipro (Unknown)  fluoroquinolone antibiotics (Other)  Tegretol (Unknown)      MEDICATIONS   aspirin enteric coated 81 milliGRAM(s) Oral daily  atorvastatin 80 milliGRAM(s) Oral at bedtime  bisacodyl 5 milliGRAM(s) Oral once  carvedilol 6.25 milliGRAM(s) Oral every 12 hours  dextrose 40% Gel 15 Gram(s) Oral once  dextrose 5%. 1000 milliLiter(s) IV Continuous <Continuous>  dextrose 5%. 1000 milliLiter(s) IV Continuous <Continuous>  dextrose 50% Injectable 25 Gram(s) IV Push once  dextrose 50% Injectable 12.5 Gram(s) IV Push once  dextrose 50% Injectable 25 Gram(s) IV Push once  furosemide    Tablet 20 milliGRAM(s) Oral daily  gabapentin 200 milliGRAM(s) Oral two times a day  glucagon  Injectable 1 milliGRAM(s) IntraMuscular once  heparin   Injectable 5000 Unit(s) SubCutaneous every 8 hours  hydrALAZINE 10 milliGRAM(s) Oral three times a day  insulin glargine Injectable (LANTUS) 18 Unit(s) SubCutaneous at bedtime  insulin lispro (ADMELOG) corrective regimen sliding scale   SubCutaneous three times a day before meals  insulin lispro (ADMELOG) corrective regimen sliding scale   SubCutaneous at bedtime  insulin lispro Injectable (ADMELOG) 6 Unit(s) SubCutaneous three times a day before meals  isosorbide   dinitrate Tablet (ISORDIL) 10 milliGRAM(s) Oral three times a day  melatonin 3 milliGRAM(s) Oral at bedtime PRN  pantoprazole    Tablet 40 milliGRAM(s) Oral before breakfast  polyethylene glycol 3350 17 Gram(s) Oral daily  senna 2 Tablet(s) Oral at bedtime  ticagrelor 90 milliGRAM(s) Oral every 12 hours      ----------------------------------------------------------------------------------------  PHYSICAL EXAM  Constitutional - NAD, Comfortable  HEENT - NCAT, EOMI  Neck - No tenderness  Chest - Breathing comfortably, No wheezing  Cardiovascular - S1S2   Abdomen - Soft   Extremities - No C/C/E, No calf tenderness; b/l hands w/ Jazmín and [place] nodes at DIP and PIP. Painful DIP and PIP especially at middle finger when fingers are passively flexed.  Neurologic Exam -                    Cognitive - Awake, Alert, AAO to self, place, date, year, situation     Communication - Fluent, No dysarthria     Cranial Nerves - CN 2-12 intact     Motor -                     LEFT    UE - ShAB 4/5, EF 4/5, EE 5/5, WE 5/5,  unable to  due to stiff MCP, DIP and PIP                    RIGHT UE - ShAB 4/5, EF 4/5, EE 5/5, WE 5/5,  unable to  due to stiff MCP, DIP and PIP                    LEFT    LE - HF 4/5, KE 5/5, DF 5/5, PF 5/5                    RIGHT LE - HF 4/5, KE 5/5, DF 5/5, PF 5/5        Sensory - Intact to LT except for b/l feet     Reflexes - DTR Intact, No primitive reflexes     Coordination - FTN intact     Balance - impaired  Psychiatric - Mood stable, Affect WNL  ----------------------------------------------------------------------------------------    ASSESSMENT/PLAN  88y Male PMH CAD s/p CABG (1999), PCI stents x 5 (2019), PVD with stent place to Right Posterior Tibial Artery? (2021), T2DM, HTN, HLD, CKD, with functional deficits after STEMI s/p stent and IABP.  - cardiac and fall precautions  - continue bedside PT  - OT pending  Pain - gabapentin for Diabetic neuropathy  DVT PPX - SCDs, HSQ  Rehab -    Recommend ACUTE inpatient rehabilitation for the functional deficits consisting of 3 hours of therapy/day & 24 hour RN/daily PMR physician for comorbid medical management. Will continue to follow for ongoing rehab needs and recommendations. Patient will be able to tolerate 3 hours a day.    will need outpatient MRI and urology follow up after rehab stay per chart

## 2022-03-04 NOTE — OCCUPATIONAL THERAPY INITIAL EVALUATION ADULT - PERTINENT HX OF CURRENT PROBLEM, REHAB EVAL
Pt is an 88 year old male with hx of CAD s/p CABG, PCI stents x 5, PVD with stent place to Right Posterior Tibial Artery? (2021), T2DM, HTN, HLD, & CKD, who presented to [facility] on 2/26/22 with near syncope and chest pain. Xray of Chest on 2/26/22 displayed mild pulmonary vascular congestive changes. US Kidney and Bladder on 3/2/22 displayed a hypoechoic focus/possible lesion arising from upper pole of left kidney.

## 2022-03-04 NOTE — PROGRESS NOTE ADULT - PROBLEM SELECTOR PLAN 1
STEMI s/p GEMMA to mSVG->OM and POBA proxSVG->OM  - Continue ASA, Brilinta, atorvastatin, Hydralazine, Isordil, Lasix   - Cardiology following   [ ] OP Cards evaluation   - PT/OT/PMR: Rehab, acute v subacute

## 2022-03-04 NOTE — OCCUPATIONAL THERAPY INITIAL EVALUATION ADULT - RANGE OF MOTION EXAMINATION, UPPER EXTREMITY
except Bilateral Shoulder Flexion Active ROM 0-90 degrees, Left Hand Flexion/Extension limited with noted arthritic changes to PIP/DIP joints, able to make 3/4 composite fist, Right Hand Flexion/Extension limited with noted arthritic changes to PIP/DIP joints, able to make 2/4 composite fist/bilateral UE Active ROM was WFL  (within functional limits)

## 2022-03-04 NOTE — PROGRESS NOTE ADULT - PROVIDER SPECIALTY LIST ADULT
CCU
CCU
Rehab Medicine
CCU
Cardiology
Hospitalist
Hospitalist
Nephrology
Endocrinology
Nephrology
Nephrology
Endocrinology
Hospitalist

## 2022-03-04 NOTE — OCCUPATIONAL THERAPY INITIAL EVALUATION ADULT - LUE MMT, REHAB EVAL
Shoulder Flexion Grossly 3/5, Elbow/Wrist Flexion/Extension Grossly 3+/5, Hand Flexion/Extension Grossly 3/5

## 2022-03-04 NOTE — PROGRESS NOTE ADULT - SUBJECTIVE AND OBJECTIVE BOX
FOLLOW UP:    SUBJECTIVE/OBSERVATIONS:  OVERNIGHT EVENTS:  TELE EVENTS:     Vital Signs Last 24 Hrs  T(C): 36.8 (04 Mar 2022 05:28), Max: 36.8 (03 Mar 2022 17:46)  T(F): 98.3 (04 Mar 2022 05:28), Max: 98.3 (03 Mar 2022 17:46)  HR: 62 (04 Mar 2022 05:28) (62 - 83)  BP: 153/63 (04 Mar 2022 05:28) (103/54 - 153/63)  BP(mean): --  RR: 16 (04 Mar 2022 05:28) (16 - 18)  SpO2: 100% (04 Mar 2022 05:28) (96% - 100%)  I&O's Summary    03 Mar 2022 07:01  -  04 Mar 2022 07:00  --------------------------------------------------------  IN: 900 mL / OUT: 600 mL / NET: 300 mL        MEDICATIONS:  aspirin enteric coated 81 milliGRAM(s) Oral daily  carvedilol 6.25 milliGRAM(s) Oral every 12 hours  furosemide    Tablet 20 milliGRAM(s) Oral daily  heparin   Injectable 5000 Unit(s) SubCutaneous every 8 hours  hydrALAZINE 10 milliGRAM(s) Oral three times a day  isosorbide   dinitrate Tablet (ISORDIL) 10 milliGRAM(s) Oral three times a day  ticagrelor 90 milliGRAM(s) Oral every 12 hours        gabapentin 200 milliGRAM(s) Oral two times a day  melatonin 3 milliGRAM(s) Oral at bedtime PRN    pantoprazole    Tablet 40 milliGRAM(s) Oral before breakfast  polyethylene glycol 3350 17 Gram(s) Oral daily  senna 2 Tablet(s) Oral at bedtime    atorvastatin 80 milliGRAM(s) Oral at bedtime  dextrose 40% Gel 15 Gram(s) Oral once  dextrose 50% Injectable 25 Gram(s) IV Push once  dextrose 50% Injectable 12.5 Gram(s) IV Push once  dextrose 50% Injectable 25 Gram(s) IV Push once  glucagon  Injectable 1 milliGRAM(s) IntraMuscular once  insulin glargine Injectable (LANTUS) 18 Unit(s) SubCutaneous at bedtime  insulin lispro (ADMELOG) corrective regimen sliding scale   SubCutaneous three times a day before meals  insulin lispro (ADMELOG) corrective regimen sliding scale   SubCutaneous at bedtime  insulin lispro Injectable (ADMELOG) 6 Unit(s) SubCutaneous three times a day before meals    dextrose 5%. 1000 milliLiter(s) IV Continuous <Continuous>  dextrose 5%. 1000 milliLiter(s) IV Continuous <Continuous>      REVIEW OF SYSTEMS:  Complete 10point ROS negative.    PHYSICAL EXAM:  General: NAD  Cardiovascular: Normal S1 S2, No JVD, No murmurs, No edema  Respiratory: Lungs clear to auscultation	  Gastrointestinal:  Soft, Non-tender, + BS	  Skin: warm and dry, No rashes, No ecchymoses, No cyanosis	  Extremities: Normal range of motion, No clubbing, cyanosis or edema  Vascular: Peripheral pulses palpable 2+ bilaterally    LABS:	 	    CBC Full  -  ( 04 Mar 2022 08:17 )  WBC Count : 8.66 K/uL  Hemoglobin : 12.9 g/dL  Hematocrit : 39.4 %  Platelet Count - Automated : 169 K/uL  Mean Cell Volume : 92.1 fL  Mean Cell Hemoglobin : 30.1 pg  Mean Cell Hemoglobin Concentration : 32.7 gm/dL  Auto Neutrophil # : x  Auto Lymphocyte # : x  Auto Monocyte # : x  Auto Eosinophil # : x  Auto Basophil # : x  Auto Neutrophil % : x  Auto Lymphocyte % : x  Auto Monocyte % : x  Auto Eosinophil % : x  Auto Basophil % : x    03-04    140  |  105  |  40<H>  ----------------------------<  161<H>  4.4   |  24  |  1.76<H>  03-03    137  |  104  |  45<H>  ----------------------------<  293<H>  4.7   |  19<L>  |  2.06<H>    Ca    8.7      04 Mar 2022 08:17  Ca    8.8      03 Mar 2022 08:20  Phos  4.0     03-04  Phos  3.8     03-03  Mg     2.30     03-04  Mg     2.30     03-03            	   FOLLOW UP:  NSTEMI  SUBJECTIVE/OBSERVATIONS:  Patient seen and examined. No complaints of chest pain.  OVERNIGHT EVENTS:  No events    TELE EVENTS:   Normal sinus  Vital Signs Last 24 Hrs  T(C): 36.8 (04 Mar 2022 05:28), Max: 36.8 (03 Mar 2022 17:46)  T(F): 98.3 (04 Mar 2022 05:28), Max: 98.3 (03 Mar 2022 17:46)  HR: 62 (04 Mar 2022 05:28) (62 - 83)  BP: 153/63 (04 Mar 2022 05:28) (103/54 - 153/63)  BP(mean): --  RR: 16 (04 Mar 2022 05:28) (16 - 18)  SpO2: 100% (04 Mar 2022 05:28) (96% - 100%)  I&O's Summary    03 Mar 2022 07:01  -  04 Mar 2022 07:00  --------------------------------------------------------  IN: 900 mL / OUT: 600 mL / NET: 300 mL        MEDICATIONS:  aspirin enteric coated 81 milliGRAM(s) Oral daily  carvedilol 6.25 milliGRAM(s) Oral every 12 hours  furosemide    Tablet 20 milliGRAM(s) Oral daily  heparin   Injectable 5000 Unit(s) SubCutaneous every 8 hours  hydrALAZINE 10 milliGRAM(s) Oral three times a day  isosorbide   dinitrate Tablet (ISORDIL) 10 milliGRAM(s) Oral three times a day  ticagrelor 90 milliGRAM(s) Oral every 12 hours        gabapentin 200 milliGRAM(s) Oral two times a day  melatonin 3 milliGRAM(s) Oral at bedtime PRN    pantoprazole    Tablet 40 milliGRAM(s) Oral before breakfast  polyethylene glycol 3350 17 Gram(s) Oral daily  senna 2 Tablet(s) Oral at bedtime    atorvastatin 80 milliGRAM(s) Oral at bedtime  dextrose 40% Gel 15 Gram(s) Oral once  dextrose 50% Injectable 25 Gram(s) IV Push once  dextrose 50% Injectable 12.5 Gram(s) IV Push once  dextrose 50% Injectable 25 Gram(s) IV Push once  glucagon  Injectable 1 milliGRAM(s) IntraMuscular once  insulin glargine Injectable (LANTUS) 18 Unit(s) SubCutaneous at bedtime  insulin lispro (ADMELOG) corrective regimen sliding scale   SubCutaneous three times a day before meals  insulin lispro (ADMELOG) corrective regimen sliding scale   SubCutaneous at bedtime  insulin lispro Injectable (ADMELOG) 6 Unit(s) SubCutaneous three times a day before meals    dextrose 5%. 1000 milliLiter(s) IV Continuous <Continuous>  dextrose 5%. 1000 milliLiter(s) IV Continuous <Continuous>      REVIEW OF SYSTEMS:  Complete 10point ROS negative.    PHYSICAL EXAM:  General: NAD  Cardiovascular: Normal S1 S2, No JVD, No murmurs, No edema  Respiratory: Lungs clear to auscultation	  Gastrointestinal:  Soft, Non-tender, + BS	  Skin: warm and dry, No rashes, No ecchymoses, No cyanosis	  Extremities: Normal range of motion, No clubbing, cyanosis or edema  Vascular: Peripheral pulses palpable 2+ bilaterally    LABS:	 	    CBC Full  -  ( 04 Mar 2022 08:17 )  WBC Count : 8.66 K/uL  Hemoglobin : 12.9 g/dL  Hematocrit : 39.4 %  Platelet Count - Automated : 169 K/uL  Mean Cell Volume : 92.1 fL  Mean Cell Hemoglobin : 30.1 pg  Mean Cell Hemoglobin Concentration : 32.7 gm/dL  Auto Neutrophil # : x  Auto Lymphocyte # : x  Auto Monocyte # : x  Auto Eosinophil # : x  Auto Basophil # : x  Auto Neutrophil % : x  Auto Lymphocyte % : x  Auto Monocyte % : x  Auto Eosinophil % : x  Auto Basophil % : x    03-04    140  |  105  |  40<H>  ----------------------------<  161<H>  4.4   |  24  |  1.76<H>  03-03    137  |  104  |  45<H>  ----------------------------<  293<H>  4.7   |  19<L>  |  2.06<H>    Ca    8.7      04 Mar 2022 08:17  Ca    8.8      03 Mar 2022 08:20  Phos  4.0     03-04  Phos  3.8     03-03  Mg     2.30     03-04  Mg     2.30     03-03

## 2022-03-04 NOTE — PROGRESS NOTE ADULT - SUBJECTIVE AND OBJECTIVE BOX
HPI:   This is an 88 year-old man with past medical history of CAD s/p CABG (1999), PCI stents x 5 (2019), PVD with stent place to Right Posterior Tibial Artery? (2021), T2DM, HTN, HLD, CKD, presented with near syncope and chest pain, admitted for NSTEMI  Consulted for: Uncontrolled T2DM    Interval history:  glucoses above goal  ordered for diet  c+peptide 5.9+    MEDICATIONS  (STANDING):  aspirin enteric coated 81 milliGRAM(s) Oral daily  atorvastatin 80 milliGRAM(s) Oral at bedtime  carvedilol 6.25 milliGRAM(s) Oral every 12 hours  dextrose 40% Gel 15 Gram(s) Oral once  dextrose 5%. 1000 milliLiter(s) (50 mL/Hr) IV Continuous <Continuous>  dextrose 5%. 1000 milliLiter(s) (100 mL/Hr) IV Continuous <Continuous>  dextrose 50% Injectable 25 Gram(s) IV Push once  dextrose 50% Injectable 12.5 Gram(s) IV Push once  dextrose 50% Injectable 25 Gram(s) IV Push once  furosemide    Tablet 20 milliGRAM(s) Oral daily  gabapentin 200 milliGRAM(s) Oral two times a day  glucagon  Injectable 1 milliGRAM(s) IntraMuscular once  heparin   Injectable 5000 Unit(s) SubCutaneous every 8 hours  hydrALAZINE 10 milliGRAM(s) Oral three times a day  insulin glargine Injectable (LANTUS) 15 Unit(s) SubCutaneous at bedtime  insulin lispro (ADMELOG) corrective regimen sliding scale   SubCutaneous three times a day before meals  insulin lispro (ADMELOG) corrective regimen sliding scale   SubCutaneous at bedtime  insulin lispro Injectable (ADMELOG) 6 Unit(s) SubCutaneous three times a day before meals  isosorbide   dinitrate Tablet (ISORDIL) 10 milliGRAM(s) Oral three times a day  pantoprazole    Tablet 40 milliGRAM(s) Oral before breakfast  polyethylene glycol 3350 17 Gram(s) Oral daily  senna 2 Tablet(s) Oral at bedtime  ticagrelor 90 milliGRAM(s) Oral every 12 hours    MEDICATIONS  (PRN):  melatonin 3 milliGRAM(s) Oral at bedtime PRN Insomnia      Allergies    carbamazepine (Other)  Cipro (Unknown)  fluoroquinolone antibiotics (Other)  Tegretol (Unknown)    Intolerances        Review of Systems:  Constitutional: No fever, good appetite/po intake  Eyes: No blurry vision, diplopia  Neuro: No tremors  HEENT: No pain  Cardiovascular: No chest pain, palpitations  Respiratory: No SOB, no cough  GI: No nausea, vomiting,   : No dysuria, hematuria  Skin: no rash  Psych: no depression  Endocrine: no polyuria, polydipsia  Hem/lymph: no swelling  Osteoporosis: no fractures    ALL OTHER SYSTEMS REVIEWED AND NEGATIVE    PHYSICAL EXAM:  VITALS: T(C): 36.8 (03-04-22 @ 05:28)  T(F): 98.3 (03-04-22 @ 05:28), Max: 98.3 (03-03-22 @ 17:46)  HR: 62 (03-04-22 @ 05:28) (62 - 83)  BP: 153/63 (03-04-22 @ 05:28) (103/54 - 153/63)  RR:  (16 - 18)  SpO2:  (96% - 100%)  Wt(kg): --  GENERAL: NAD, well-groomed, well-developed  EYES: No proptosis, no lid lag, anicteric  HEENT:  Atraumatic, normocephalic, moist mucous membranes  RESPIRATORY: nonlabored respirations, no wheezing  PSYCH: Alert and oriented x 3, normal affect, normal mood    POCT Blood Glucose.: 224 mg/dL (03-04-22 @ 09:04)  POCT Blood Glucose.: 270 mg/dL (03-03-22 @ 21:49)  POCT Blood Glucose.: 264 mg/dL (03-03-22 @ 17:50)  POCT Blood Glucose.: 297 mg/dL (03-03-22 @ 12:20)  POCT Blood Glucose.: 266 mg/dL (03-03-22 @ 08:57)  POCT Blood Glucose.: 198 mg/dL (03-02-22 @ 21:58)  POCT Blood Glucose.: 205 mg/dL (03-02-22 @ 17:44)  POCT Blood Glucose.: 297 mg/dL (03-02-22 @ 12:21)  POCT Blood Glucose.: 449 mg/dL (03-02-22 @ 08:42)  POCT Blood Glucose.: 188 mg/dL (03-01-22 @ 23:37)  POCT Blood Glucose.: 213 mg/dL (03-01-22 @ 21:24)  POCT Blood Glucose.: 163 mg/dL (03-01-22 @ 17:32)  POCT Blood Glucose.: 214 mg/dL (03-01-22 @ 16:11)  POCT Blood Glucose.: 285 mg/dL (03-01-22 @ 11:57)                            12.9   8.66  )-----------( 169      ( 04 Mar 2022 08:17 )             39.4       03-04    140  |  105  |  40<H>  ----------------------------<  161<H>  4.4   |  24  |  1.76<H>    EGFR if : x   EGFR if non : x     Ca    8.7      03-04  Mg     2.30     03-04  Phos  4.0     03-04        Thyroid Function Tests:  02-27 @ 02:26 TSH 1.71 FreeT4 -- T3 66 Anti TPO -- Anti Thyroglobulin Ab -- TSI --      02-27 Chol 113 Direct LDL -- LDL calculated 53 HDL 51 Trig 46    Radiology:            HPI:   This is an 88 year-old man with past medical history of CAD s/p CABG (1999), PCI stents x 5 (2019), PVD with stent place to Right Posterior Tibial Artery? (2021), T2DM, HTN, HLD, CKD, presented with near syncope and chest pain, admitted for NSTEMI  Consulted for: Uncontrolled T2DM    Interval history:  glucoses above goal  ordered for diet  c+peptide 5.9+  pt feels well today    MEDICATIONS  (STANDING):  aspirin enteric coated 81 milliGRAM(s) Oral daily  atorvastatin 80 milliGRAM(s) Oral at bedtime  carvedilol 6.25 milliGRAM(s) Oral every 12 hours  dextrose 40% Gel 15 Gram(s) Oral once  dextrose 5%. 1000 milliLiter(s) (50 mL/Hr) IV Continuous <Continuous>  dextrose 5%. 1000 milliLiter(s) (100 mL/Hr) IV Continuous <Continuous>  dextrose 50% Injectable 25 Gram(s) IV Push once  dextrose 50% Injectable 12.5 Gram(s) IV Push once  dextrose 50% Injectable 25 Gram(s) IV Push once  furosemide    Tablet 20 milliGRAM(s) Oral daily  gabapentin 200 milliGRAM(s) Oral two times a day  glucagon  Injectable 1 milliGRAM(s) IntraMuscular once  heparin   Injectable 5000 Unit(s) SubCutaneous every 8 hours  hydrALAZINE 10 milliGRAM(s) Oral three times a day  insulin glargine Injectable (LANTUS) 15 Unit(s) SubCutaneous at bedtime  insulin lispro (ADMELOG) corrective regimen sliding scale   SubCutaneous three times a day before meals  insulin lispro (ADMELOG) corrective regimen sliding scale   SubCutaneous at bedtime  insulin lispro Injectable (ADMELOG) 6 Unit(s) SubCutaneous three times a day before meals  isosorbide   dinitrate Tablet (ISORDIL) 10 milliGRAM(s) Oral three times a day  pantoprazole    Tablet 40 milliGRAM(s) Oral before breakfast  polyethylene glycol 3350 17 Gram(s) Oral daily  senna 2 Tablet(s) Oral at bedtime  ticagrelor 90 milliGRAM(s) Oral every 12 hours    MEDICATIONS  (PRN):  melatonin 3 milliGRAM(s) Oral at bedtime PRN Insomnia      Allergies    carbamazepine (Other)  Cipro (Unknown)  fluoroquinolone antibiotics (Other)  Tegretol (Unknown)    Intolerances        Review of Systems:  Constitutional: No fever, good appetite/po intake  Eyes: No blurry vision, diplopia  Neuro: No tremors  HEENT: No pain  Cardiovascular: No chest pain, palpitations  Respiratory: No SOB, no cough  GI: No nausea, vomiting,   : No dysuria, hematuria  Skin: no rash  Psych: no depression  Endocrine: no polyuria, polydipsia  Hem/lymph: no swelling  Osteoporosis: no fractures    ALL OTHER SYSTEMS REVIEWED AND NEGATIVE    PHYSICAL EXAM:  VITALS: T(C): 36.8 (03-04-22 @ 05:28)  T(F): 98.3 (03-04-22 @ 05:28), Max: 98.3 (03-03-22 @ 17:46)  HR: 62 (03-04-22 @ 05:28) (62 - 83)  BP: 153/63 (03-04-22 @ 05:28) (103/54 - 153/63)  RR:  (16 - 18)  SpO2:  (96% - 100%)  Wt(kg): --  GENERAL: NAD, well-groomed, well-developed  EYES: No proptosis, no lid lag, anicteric  HEENT:  Atraumatic, normocephalic, moist mucous membranes  RESPIRATORY: nonlabored respirations, no wheezing  PSYCH: Alert and oriented x 3, normal affect, normal mood    POCT Blood Glucose.: 224 mg/dL (03-04-22 @ 09:04)  POCT Blood Glucose.: 270 mg/dL (03-03-22 @ 21:49)  POCT Blood Glucose.: 264 mg/dL (03-03-22 @ 17:50)  POCT Blood Glucose.: 297 mg/dL (03-03-22 @ 12:20)  POCT Blood Glucose.: 266 mg/dL (03-03-22 @ 08:57)  POCT Blood Glucose.: 198 mg/dL (03-02-22 @ 21:58)  POCT Blood Glucose.: 205 mg/dL (03-02-22 @ 17:44)  POCT Blood Glucose.: 297 mg/dL (03-02-22 @ 12:21)  POCT Blood Glucose.: 449 mg/dL (03-02-22 @ 08:42)  POCT Blood Glucose.: 188 mg/dL (03-01-22 @ 23:37)  POCT Blood Glucose.: 213 mg/dL (03-01-22 @ 21:24)  POCT Blood Glucose.: 163 mg/dL (03-01-22 @ 17:32)  POCT Blood Glucose.: 214 mg/dL (03-01-22 @ 16:11)  POCT Blood Glucose.: 285 mg/dL (03-01-22 @ 11:57)                            12.9   8.66  )-----------( 169      ( 04 Mar 2022 08:17 )             39.4       03-04    140  |  105  |  40<H>  ----------------------------<  161<H>  4.4   |  24  |  1.76<H>    EGFR if : x   EGFR if non : x     Ca    8.7      03-04  Mg     2.30     03-04  Phos  4.0     03-04        Thyroid Function Tests:  02-27 @ 02:26 TSH 1.71 FreeT4 -- T3 66 Anti TPO -- Anti Thyroglobulin Ab -- TSI --      02-27 Chol 113 Direct LDL -- LDL calculated 53 HDL 51 Trig 46    Radiology:

## 2022-03-04 NOTE — PROGRESS NOTE ADULT - SUBJECTIVE AND OBJECTIVE BOX
Merlin Mathew, MD   Hospitalist  Pager #[phone]    PROGRESS NOTE:     Patient is a 88y old  Male who presents with a chief complaint of Angina pectoralis (04 Mar 2022 10:29)      SUBJECTIVE / OVERNIGHT EVENTS: No overnight events  Patient has no complaints, denies any CP, SOB. Has been eating and drinking well. Has not had a BM in a few days.   Spoke to son and daughter at bedside who feel patient is at MS baseline   ADDITIONAL REVIEW OF SYSTEMS:    MEDICATIONS  (STANDING):  aspirin enteric coated 81 milliGRAM(s) Oral daily  atorvastatin 80 milliGRAM(s) Oral at bedtime  bisacodyl 5 milliGRAM(s) Oral once  carvedilol 6.25 milliGRAM(s) Oral every 12 hours  dextrose 40% Gel 15 Gram(s) Oral once  dextrose 5%. 1000 milliLiter(s) (50 mL/Hr) IV Continuous <Continuous>  dextrose 5%. 1000 milliLiter(s) (100 mL/Hr) IV Continuous <Continuous>  dextrose 50% Injectable 25 Gram(s) IV Push once  dextrose 50% Injectable 12.5 Gram(s) IV Push once  dextrose 50% Injectable 25 Gram(s) IV Push once  furosemide    Tablet 20 milliGRAM(s) Oral daily  gabapentin 200 milliGRAM(s) Oral two times a day  glucagon  Injectable 1 milliGRAM(s) IntraMuscular once  heparin   Injectable 5000 Unit(s) SubCutaneous every 8 hours  hydrALAZINE 10 milliGRAM(s) Oral three times a day  insulin glargine Injectable (LANTUS) 18 Unit(s) SubCutaneous at bedtime  insulin lispro (ADMELOG) corrective regimen sliding scale   SubCutaneous three times a day before meals  insulin lispro (ADMELOG) corrective regimen sliding scale   SubCutaneous at bedtime  insulin lispro Injectable (ADMELOG) 6 Unit(s) SubCutaneous three times a day before meals  isosorbide   dinitrate Tablet (ISORDIL) 10 milliGRAM(s) Oral three times a day  pantoprazole    Tablet 40 milliGRAM(s) Oral before breakfast  polyethylene glycol 3350 17 Gram(s) Oral daily  senna 2 Tablet(s) Oral at bedtime  ticagrelor 90 milliGRAM(s) Oral every 12 hours    MEDICATIONS  (PRN):  melatonin 3 milliGRAM(s) Oral at bedtime PRN Insomnia      CAPILLARY BLOOD GLUCOSE      POCT Blood Glucose.: 236 mg/dL (04 Mar 2022 12:18)  POCT Blood Glucose.: 224 mg/dL (04 Mar 2022 09:04)  POCT Blood Glucose.: 270 mg/dL (03 Mar 2022 21:49)  POCT Blood Glucose.: 264 mg/dL (03 Mar 2022 17:50)    I&O's Summary    03 Mar 2022 07:01  -  04 Mar 2022 07:00  --------------------------------------------------------  IN: 900 mL / OUT: 600 mL / NET: 300 mL        PHYSICAL EXAM:  Vital Signs Last 24 Hrs  T(C): 36.2 (04 Mar 2022 12:28), Max: 36.8 (03 Mar 2022 17:46)  T(F): 97.2 (04 Mar 2022 12:28), Max: 98.3 (03 Mar 2022 17:46)  HR: 72 (04 Mar 2022 12:28) (62 - 83)  BP: 139/56 (04 Mar 2022 12:28) (103/54 - 153/63)  BP(mean): --  RR: 14 (04 Mar 2022 12:28) (14 - 18)  SpO2: 100% (04 Mar 2022 12:28) (96% - 100%)    CONSTITUTIONAL: NAD, well-developed male  RESPIRATORY: Normal respiratory effort; lungs are clear to auscultation bilaterally  CARDIOVASCULAR: Regular rate and rhythm, normal S1 and S2, no murmur/rub/gallop; No lower extremity edema; Peripheral pulses are 2+ bilaterally  ABDOMEN: Nontender to palpation, normoactive bowel sounds, no rebound/guarding; No hepatosplenomegaly  MUSCULOSKELETAL no clubbing or cyanosis of digits; no joint swelling or tenderness to palpation  PSYCH: A+O to person, place, and time; affect appropriate    LABS:                        12.9   8.66  )-----------( 169      ( 04 Mar 2022 08:17 )             39.4     03-04    140  |  105  |  40<H>  ----------------------------<  161<H>  4.4   |  24  |  1.76<H>    Ca    8.7      04 Mar 2022 08:17  Phos  4.0     03-04  Mg     2.30     03-04                  RADIOLOGY & ADDITIONAL TESTS:  Results Reviewed:   Imaging Personally Reviewed:  Electrocardiogram Personally Reviewed:    COORDINATION OF CARE:  Care Discussed with Consultants/Other Providers [Y/N]:  Prior or Outpatient Records Reviewed [Y/N]:

## 2022-03-04 NOTE — PROGRESS NOTE ADULT - ASSESSMENT
88M with CAD/MI (CABG and stents X 5), PAD with RLE stent, HTN, HLD, DM2, CKD, and CVA without residual presents with chest pain and near syncope -- + NSTEMI and now s/p POBA and GEMMA to 95% mSVG to OM1. Course c/b decompensated HF and IABP placed for mechanical support (removed 2/28). Now with stable MABLE on CKD.     #NSTEMI   - s/p POBA and GEMMA to mSVG to OM1, IABP for initial mechanical support  - continue DAPT, lipitor, coreg  - ECHO with mild segmental LV dysfunction, EF approx 45%  - no ACE/ARB given the CKD    # acute systolic CHF  - appears euvolemic on exam  - continue Lasix 20 mg po QD  - continue Isordil and hydralazine for afterload reduction  -BP elevated this  am prior to medications,overall BP has been in good range, would not uptitrate meds    Dispo:  - OOB as tolerate  - continue PT  - eventual rehab, repeat COVID neg     88M with CAD/MI (CABG and stents X 5), PAD with RLE stent, HTN, HLD, DM2, CKD, and CVA without residual presents with chest pain and near syncope -- + NSTEMI and now s/p POBA and GEMMA to 95% mSVG to OM1. Course c/b decompensated HF and IABP placed for mechanical support (removed 2/28). Now with stable MABLE on CKD.     #NSTEMI   - s/p POBA and GEMMA to mSVG to OM1, IABP for initial mechanical support  - continue DAPT, lipitor, coreg  - ECHO with mild segmental LV dysfunction, EF approx 45%  - no ACE/ARB given the CKD    # acute systolic CHF  - appears euvolemic on exam  - continue Lasix 20 mg po QD  - continue Isordil and hydralazine for afterload reduction  -BP elevated this  am prior to medications,overall BP has been in good range, would not uptitrate meds    Dispo:  - OOB as tolerate  - continue PT  - eventual rehab, repeat COVID neg  -will need to follow up with cardiologist within 2 weeks of discharge (Dr. Gege Wade)

## 2022-03-04 NOTE — OCCUPATIONAL THERAPY INITIAL EVALUATION ADULT - GENERAL OBSERVATIONS, REHAB EVAL
Pt. received out of bed, sitting in chair. No acute distress. Patient agreed to evaluation from Occupational Therapist. +Heplock, +Pulse ox to Right Finger, +Daughter bedside.

## 2022-03-04 NOTE — OCCUPATIONAL THERAPY INITIAL EVALUATION ADULT - BED MOBILITY/TRANSFERS, PREVIOUS LEVEL OF FUNCTION, OT EVAL
Pt reports he was using no assistive device for household functional mobility and a rollator for community functional mobility/independent

## 2022-03-04 NOTE — OCCUPATIONAL THERAPY INITIAL EVALUATION ADULT - LIVES WITH, PROFILE
Pt. reports he lives with his wife, son, daughter-in-law, and 5 grandchildren in a house with 4 steps to enter. Once inside, pt. reports bedroom and bathroom are located on the main level. Per pt., he has a shower stall in his bathroom with grab bar available.

## 2022-03-04 NOTE — CHART NOTE - NSCHARTNOTEFT_GEN_A_CORE
Spoke to urology. Reasonable to pursue outpatient MRI for further imaging of kidney lesion. No need for inpatient MRI, especially if patient has a bed available at [facility]. D/W Dr. De La O and patient as well as his wife who is present at bedside. In agreement with discharge to rehab tomorrow at 1pm and then to pursue outpatient urology follow up and imaging for further workup.

## 2022-03-04 NOTE — PROGRESS NOTE ADULT - ASSESSMENT
This is an 88 year-old man with past medical history of CAD s/p CABG (1999), PCI stents x 5 (2019), PVD with stent place to Right Posterior Tibial Artery? (2021), T2DM, HTN, HLD, CKD, presented with near syncope and chest pain, admitted for NSTEMI  Consulted for: Uncontrolled T2DM    #Uncontrolled T2DM, c/b CAD, PVD, CKD  A1c 8.8%. Goal < 8%  Inpatient FS goal 140-180  Home regimen: Basaglar 40 units every morning, Jardiance 25 mg daily, and Tradjenta 5 mg daily   Recs:   -FS qAC and qHS  -increase Lantus 15->18 units qHS (order changed)  -continue Admelog 6 units qAC. HOLD if NPO  -low dose correctional scale qAC and qHS  -consistent carb diet (patient is eating home food)  - family at bedside requesting nutrition consultation to help with meal planning at home       For d/c:   +cpeptide 5.9, egfr 37 today  plan to discharge on basaglar (dose tbd)+ jardiance 25 mg daily  tradjenta 5 mg daily. Monitor renal function as outpatient   Can fu with Dr. Tessa Ulloa for Endocrinology.     #HTN  BP Goal < 130/80  Continue management per primary team    #HLD  LDL goal < 70  on lipitor     Nina Lockhart MD  Attending Physician   Department of Endocrinology, Diabetes and Metabolism     If before 9AM or after 5PM, or on weekends/holidays, please call the Endocrine answering service for assistance ([phone]).  For nonurgent matters, please email [email or web address] for assistance.          This is an 88 year-old man with past medical history of CAD s/p CABG (1999), PCI stents x 5 (2019), PVD with stent place to Right Posterior Tibial Artery? (2021), T2DM, HTN, HLD, CKD, presented with near syncope and chest pain, admitted for NSTEMI  Consulted for: Uncontrolled T2DM    #Uncontrolled T2DM, c/b CAD, PVD, CKD  A1c 8.8%. Goal < 8%  Inpatient FS goal 140-180  Home regimen: Basaglar 40 units every morning, Jardiance 25 mg daily, and Tradjenta 5 mg daily   Recs:   -FS qAC and qHS  -increase Lantus 15->18 units qHS (order changed)  -continue Admelog 6 units qAC. HOLD if NPO  -low dose correctional scale qAC and qHS  -consistent carb diet (patient is eating home food)  - family at bedside requesting nutrition consultation to help with meal planning at home   - family requested RD consult      For d/c:   +cpeptide 5.9, egfr 37 today  plan to discharge on basaglar (dose tbd)+ jardiance 25 mg daily  tradjenta 5 mg daily. Monitor renal function as outpatient   Can fu with Dr. Tessa Ulloa for Endocrinology.     #HTN  BP Goal < 130/80  Continue management per primary team    #HLD  LDL goal < 70  on lipitor     paged team to discuss    Nina Lockhart MD  Attending Physician   Department of Endocrinology, Diabetes and Metabolism     If before 9AM or after 5PM, or on weekends/holidays, please call the Endocrine answering service for assistance ([phone]).  For nonurgent matters, please email [email or web address] for assistance.

## 2022-03-04 NOTE — PROGRESS NOTE ADULT - PROBLEM SELECTOR PLAN 2
A1C 8.8   Cont Lantus 18 units Qhs + Lispro 6 units w/ meals + CDI   [ ] D/C regimen: Lantus __, Jardiance 25mg QD, Tradjenta 5mg QD   - Endocrine following   [ ] OP Endo follow up w/ Dr. Tessa Ulloa
Continue lantus and admelog pre-meal - titrate to goal FS of 140-180.  A1c 8.8.  Endocrine consult for optimal DM management in a setting of NSTEMI.
Continue lantus and admelog pre-meal - titrate to goal FS of 140-180.  A1c 8.8.  Endocrine consult for optimal DM management in a setting of NSTEMI.

## 2022-03-05 ENCOUNTER — INPATIENT (INPATIENT)
Facility: HOSPITAL | Age: 87
LOS: 4 days | Discharge: HOME HEALTH | DRG: 949 | End: 2022-03-10
Attending: INTERNAL MEDICINE | Admitting: INTERNAL MEDICINE
Payer: MEDICARE

## 2022-03-05 ENCOUNTER — TRANSCRIPTION ENCOUNTER (OUTPATIENT)
Age: 87
End: 2022-03-05

## 2022-03-05 VITALS
DIASTOLIC BLOOD PRESSURE: 68 MMHG | HEIGHT: 68 IN | OXYGEN SATURATION: 99 % | TEMPERATURE: 98 F | HEART RATE: 82 BPM | SYSTOLIC BLOOD PRESSURE: 146 MMHG | RESPIRATION RATE: 16 BRPM | WEIGHT: 174.61 LBS

## 2022-03-05 VITALS
RESPIRATION RATE: 16 BRPM | TEMPERATURE: 99 F | DIASTOLIC BLOOD PRESSURE: 64 MMHG | SYSTOLIC BLOOD PRESSURE: 132 MMHG | HEART RATE: 82 BPM | OXYGEN SATURATION: 98 %

## 2022-03-05 DIAGNOSIS — I21.3 ST ELEVATION (STEMI) MYOCARDIAL INFARCTION OF UNSPECIFIED SITE: ICD-10-CM

## 2022-03-05 DIAGNOSIS — Z98.89 OTHER SPECIFIED POSTPROCEDURAL STATES: Chronic | ICD-10-CM

## 2022-03-05 DIAGNOSIS — Z95.5 PRESENCE OF CORONARY ANGIOPLASTY IMPLANT AND GRAFT: Chronic | ICD-10-CM

## 2022-03-05 LAB
ANION GAP SERPL CALC-SCNC: 12 MMOL/L — SIGNIFICANT CHANGE UP (ref 7–14)
BASOPHILS # BLD AUTO: 0.06 K/UL — SIGNIFICANT CHANGE UP (ref 0–0.2)
BASOPHILS NFR BLD AUTO: 0.8 % — SIGNIFICANT CHANGE UP (ref 0–2)
BUN SERPL-MCNC: 40 MG/DL — HIGH (ref 7–23)
CALCIUM SERPL-MCNC: 8.7 MG/DL — SIGNIFICANT CHANGE UP (ref 8.4–10.5)
CHLORIDE SERPL-SCNC: 102 MMOL/L — SIGNIFICANT CHANGE UP (ref 98–107)
CO2 SERPL-SCNC: 22 MMOL/L — SIGNIFICANT CHANGE UP (ref 22–31)
CREAT SERPL-MCNC: 1.66 MG/DL — HIGH (ref 0.5–1.3)
EGFR: 39 ML/MIN/1.73M2 — LOW
EOSINOPHIL # BLD AUTO: 0.35 K/UL — SIGNIFICANT CHANGE UP (ref 0–0.5)
EOSINOPHIL NFR BLD AUTO: 4.8 % — SIGNIFICANT CHANGE UP (ref 0–6)
GLUCOSE BLDC GLUCOMTR-MCNC: 205 MG/DL — HIGH (ref 70–99)
GLUCOSE BLDC GLUCOMTR-MCNC: 210 MG/DL — HIGH (ref 70–99)
GLUCOSE BLDC GLUCOMTR-MCNC: 233 MG/DL — HIGH (ref 70–99)
GLUCOSE BLDC GLUCOMTR-MCNC: 291 MG/DL — HIGH (ref 70–99)
GLUCOSE SERPL-MCNC: 147 MG/DL — HIGH (ref 70–99)
HCT VFR BLD CALC: 38.3 % — LOW (ref 39–50)
HGB BLD-MCNC: 12.8 G/DL — LOW (ref 13–17)
IANC: 4.07 K/UL — SIGNIFICANT CHANGE UP (ref 1.5–8.5)
IMM GRANULOCYTES NFR BLD AUTO: 1 % — SIGNIFICANT CHANGE UP (ref 0–1.5)
LYMPHOCYTES # BLD AUTO: 1.91 K/UL — SIGNIFICANT CHANGE UP (ref 1–3.3)
LYMPHOCYTES # BLD AUTO: 26 % — SIGNIFICANT CHANGE UP (ref 13–44)
MAGNESIUM SERPL-MCNC: 2.3 MG/DL — SIGNIFICANT CHANGE UP (ref 1.6–2.6)
MCHC RBC-ENTMCNC: 30.5 PG — SIGNIFICANT CHANGE UP (ref 27–34)
MCHC RBC-ENTMCNC: 33.4 GM/DL — SIGNIFICANT CHANGE UP (ref 32–36)
MCV RBC AUTO: 91.2 FL — SIGNIFICANT CHANGE UP (ref 80–100)
MONOCYTES # BLD AUTO: 0.9 K/UL — SIGNIFICANT CHANGE UP (ref 0–0.9)
MONOCYTES NFR BLD AUTO: 12.2 % — SIGNIFICANT CHANGE UP (ref 2–14)
NEUTROPHILS # BLD AUTO: 4.07 K/UL — SIGNIFICANT CHANGE UP (ref 1.8–7.4)
NEUTROPHILS NFR BLD AUTO: 55.2 % — SIGNIFICANT CHANGE UP (ref 43–77)
NRBC # BLD: 0 /100 WBCS — SIGNIFICANT CHANGE UP
NRBC # FLD: 0 K/UL — SIGNIFICANT CHANGE UP
PHOSPHATE SERPL-MCNC: 3.9 MG/DL — SIGNIFICANT CHANGE UP (ref 2.5–4.5)
PLATELET # BLD AUTO: 171 K/UL — SIGNIFICANT CHANGE UP (ref 150–400)
POTASSIUM SERPL-MCNC: 4.3 MMOL/L — SIGNIFICANT CHANGE UP (ref 3.5–5.3)
POTASSIUM SERPL-SCNC: 4.3 MMOL/L — SIGNIFICANT CHANGE UP (ref 3.5–5.3)
RBC # BLD: 4.2 M/UL — SIGNIFICANT CHANGE UP (ref 4.2–5.8)
RBC # FLD: 14.6 % — HIGH (ref 10.3–14.5)
SARS-COV-2 RNA SPEC QL NAA+PROBE: SIGNIFICANT CHANGE UP
SODIUM SERPL-SCNC: 136 MMOL/L — SIGNIFICANT CHANGE UP (ref 135–145)
WBC # BLD: 7.36 K/UL — SIGNIFICANT CHANGE UP (ref 3.8–10.5)
WBC # FLD AUTO: 7.36 K/UL — SIGNIFICANT CHANGE UP (ref 3.8–10.5)

## 2022-03-05 PROCEDURE — 99238 HOSP IP/OBS DSCHRG MGMT 30/<: CPT

## 2022-03-05 RX ORDER — POLYETHYLENE GLYCOL 3350 17 G/17G
17 POWDER, FOR SOLUTION ORAL
Qty: 0 | Refills: 0 | DISCHARGE
Start: 2022-03-05

## 2022-03-05 RX ORDER — RANOLAZINE 500 MG/1
1 TABLET, FILM COATED, EXTENDED RELEASE ORAL
Qty: 0 | Refills: 0 | DISCHARGE

## 2022-03-05 RX ORDER — HYDRALAZINE HCL 50 MG
10 TABLET ORAL THREE TIMES A DAY
Refills: 0 | Status: DISCONTINUED | OUTPATIENT
Start: 2022-03-05 | End: 2022-03-10

## 2022-03-05 RX ORDER — INSULIN GLARGINE 100 [IU]/ML
35 INJECTION, SOLUTION SUBCUTANEOUS
Qty: 0 | Refills: 0 | DISCHARGE

## 2022-03-05 RX ORDER — INSULIN LISPRO 100/ML
6 VIAL (ML) SUBCUTANEOUS
Refills: 0 | Status: DISCONTINUED | OUTPATIENT
Start: 2022-03-05 | End: 2022-03-10

## 2022-03-05 RX ORDER — GLUCAGON INJECTION, SOLUTION 0.5 MG/.1ML
1 INJECTION, SOLUTION SUBCUTANEOUS ONCE
Refills: 0 | Status: DISCONTINUED | OUTPATIENT
Start: 2022-03-05 | End: 2022-03-10

## 2022-03-05 RX ORDER — LANOLIN ALCOHOL/MO/W.PET/CERES
3 CREAM (GRAM) TOPICAL AT BEDTIME
Refills: 0 | Status: DISCONTINUED | OUTPATIENT
Start: 2022-03-05 | End: 2022-03-10

## 2022-03-05 RX ORDER — SENNA PLUS 8.6 MG/1
2 TABLET ORAL AT BEDTIME
Refills: 0 | Status: DISCONTINUED | OUTPATIENT
Start: 2022-03-05 | End: 2022-03-10

## 2022-03-05 RX ORDER — FUROSEMIDE 40 MG
20 TABLET ORAL DAILY
Refills: 0 | Status: DISCONTINUED | OUTPATIENT
Start: 2022-03-06 | End: 2022-03-10

## 2022-03-05 RX ORDER — ATORVASTATIN CALCIUM 80 MG/1
1 TABLET, FILM COATED ORAL
Qty: 0 | Refills: 0 | DISCHARGE
Start: 2022-03-05

## 2022-03-05 RX ORDER — GABAPENTIN 400 MG/1
200 CAPSULE ORAL
Refills: 0 | Status: DISCONTINUED | OUTPATIENT
Start: 2022-03-05 | End: 2022-03-10

## 2022-03-05 RX ORDER — CARVEDILOL PHOSPHATE 80 MG/1
6.25 CAPSULE, EXTENDED RELEASE ORAL EVERY 12 HOURS
Refills: 0 | Status: DISCONTINUED | OUTPATIENT
Start: 2022-03-05 | End: 2022-03-10

## 2022-03-05 RX ORDER — DEXTROSE 50 % IN WATER 50 %
15 SYRINGE (ML) INTRAVENOUS ONCE
Refills: 0 | Status: DISCONTINUED | OUTPATIENT
Start: 2022-03-05 | End: 2022-03-10

## 2022-03-05 RX ORDER — DEXTROSE 10 % IN WATER 10 %
250 INTRAVENOUS SOLUTION INTRAVENOUS ONCE
Refills: 0 | Status: DISCONTINUED | OUTPATIENT
Start: 2022-03-05 | End: 2022-03-10

## 2022-03-05 RX ORDER — SODIUM CHLORIDE 9 MG/ML
1000 INJECTION, SOLUTION INTRAVENOUS
Refills: 0 | Status: DISCONTINUED | OUTPATIENT
Start: 2022-03-05 | End: 2022-03-10

## 2022-03-05 RX ORDER — INSULIN LISPRO 100/ML
VIAL (ML) SUBCUTANEOUS AT BEDTIME
Refills: 0 | Status: DISCONTINUED | OUTPATIENT
Start: 2022-03-05 | End: 2022-03-10

## 2022-03-05 RX ORDER — ROSUVASTATIN CALCIUM 5 MG/1
1 TABLET ORAL
Qty: 0 | Refills: 0 | DISCHARGE

## 2022-03-05 RX ORDER — POLYETHYLENE GLYCOL 3350 17 G/17G
17 POWDER, FOR SOLUTION ORAL DAILY
Refills: 0 | Status: DISCONTINUED | OUTPATIENT
Start: 2022-03-05 | End: 2022-03-10

## 2022-03-05 RX ORDER — ISOSORBIDE DINITRATE 5 MG/1
1 TABLET ORAL
Qty: 0 | Refills: 0 | DISCHARGE
Start: 2022-03-05

## 2022-03-05 RX ORDER — ATORVASTATIN CALCIUM 80 MG/1
80 TABLET, FILM COATED ORAL AT BEDTIME
Refills: 0 | Status: DISCONTINUED | OUTPATIENT
Start: 2022-03-05 | End: 2022-03-10

## 2022-03-05 RX ORDER — DEXTROSE 10 % IN WATER 10 %
125 INTRAVENOUS SOLUTION INTRAVENOUS ONCE
Refills: 0 | Status: DISCONTINUED | OUTPATIENT
Start: 2022-03-05 | End: 2022-03-10

## 2022-03-05 RX ORDER — SOD,AMMONIUM,POTASSIUM LACTATE
1 CREAM (GRAM) TOPICAL
Qty: 0 | Refills: 0 | DISCHARGE

## 2022-03-05 RX ORDER — PANTOPRAZOLE SODIUM 20 MG/1
40 TABLET, DELAYED RELEASE ORAL
Refills: 0 | Status: DISCONTINUED | OUTPATIENT
Start: 2022-03-06 | End: 2022-03-10

## 2022-03-05 RX ORDER — INSULIN LISPRO 100/ML
VIAL (ML) SUBCUTANEOUS
Refills: 0 | Status: DISCONTINUED | OUTPATIENT
Start: 2022-03-05 | End: 2022-03-10

## 2022-03-05 RX ORDER — HYDRALAZINE HCL 50 MG
1 TABLET ORAL
Qty: 0 | Refills: 0 | DISCHARGE
Start: 2022-03-05

## 2022-03-05 RX ORDER — GABAPENTIN 400 MG/1
2 CAPSULE ORAL
Qty: 0 | Refills: 0 | DISCHARGE
Start: 2022-03-05

## 2022-03-05 RX ORDER — INSULIN GLARGINE 100 [IU]/ML
18 INJECTION, SOLUTION SUBCUTANEOUS AT BEDTIME
Refills: 0 | Status: DISCONTINUED | OUTPATIENT
Start: 2022-03-05 | End: 2022-03-06

## 2022-03-05 RX ORDER — PSYLLIUM SEED (WITH DEXTROSE)
1 POWDER (GRAM) ORAL
Qty: 0 | Refills: 0 | DISCHARGE

## 2022-03-05 RX ORDER — ASPIRIN/CALCIUM CARB/MAGNESIUM 324 MG
81 TABLET ORAL DAILY
Refills: 0 | Status: DISCONTINUED | OUTPATIENT
Start: 2022-03-05 | End: 2022-03-10

## 2022-03-05 RX ORDER — ACETAMINOPHEN 500 MG
650 TABLET ORAL EVERY 6 HOURS
Refills: 0 | Status: DISCONTINUED | OUTPATIENT
Start: 2022-03-05 | End: 2022-03-10

## 2022-03-05 RX ORDER — TELMISARTAN 20 MG/1
1 TABLET ORAL
Qty: 0 | Refills: 0 | DISCHARGE

## 2022-03-05 RX ORDER — HEPARIN SODIUM 5000 [USP'U]/ML
5000 INJECTION INTRAVENOUS; SUBCUTANEOUS EVERY 8 HOURS
Refills: 0 | Status: DISCONTINUED | OUTPATIENT
Start: 2022-03-05 | End: 2022-03-10

## 2022-03-05 RX ORDER — SENNA PLUS 8.6 MG/1
2 TABLET ORAL
Qty: 0 | Refills: 0 | DISCHARGE
Start: 2022-03-05

## 2022-03-05 RX ORDER — INFLUENZA VIRUS VACCINE 15; 15; 15; 15 UG/.5ML; UG/.5ML; UG/.5ML; UG/.5ML
0.7 SUSPENSION INTRAMUSCULAR ONCE
Refills: 0 | Status: DISCONTINUED | OUTPATIENT
Start: 2022-03-05 | End: 2022-03-10

## 2022-03-05 RX ORDER — ISOSORBIDE DINITRATE 5 MG/1
10 TABLET ORAL THREE TIMES A DAY
Refills: 0 | Status: DISCONTINUED | OUTPATIENT
Start: 2022-03-05 | End: 2022-03-10

## 2022-03-05 RX ORDER — KETOCONAZOLE 20 MG/G
1 AEROSOL, FOAM TOPICAL
Qty: 0 | Refills: 0 | DISCHARGE

## 2022-03-05 RX ORDER — TICAGRELOR 90 MG/1
90 TABLET ORAL EVERY 12 HOURS
Refills: 0 | Status: DISCONTINUED | OUTPATIENT
Start: 2022-03-05 | End: 2022-03-10

## 2022-03-05 RX ADMIN — Medication 81 MILLIGRAM(S): at 16:21

## 2022-03-05 RX ADMIN — CARVEDILOL PHOSPHATE 6.25 MILLIGRAM(S): 80 CAPSULE, EXTENDED RELEASE ORAL at 17:39

## 2022-03-05 RX ADMIN — POLYETHYLENE GLYCOL 3350 17 GRAM(S): 17 POWDER, FOR SOLUTION ORAL at 16:21

## 2022-03-05 RX ADMIN — GABAPENTIN 200 MILLIGRAM(S): 400 CAPSULE ORAL at 17:39

## 2022-03-05 RX ADMIN — Medication 20 MILLIGRAM(S): at 06:38

## 2022-03-05 RX ADMIN — Medication 2: at 09:03

## 2022-03-05 RX ADMIN — INSULIN GLARGINE 18 UNIT(S): 100 INJECTION, SOLUTION SUBCUTANEOUS at 21:58

## 2022-03-05 RX ADMIN — ISOSORBIDE DINITRATE 10 MILLIGRAM(S): 5 TABLET ORAL at 06:38

## 2022-03-05 RX ADMIN — Medication 10 MILLIGRAM(S): at 06:39

## 2022-03-05 RX ADMIN — PANTOPRAZOLE SODIUM 40 MILLIGRAM(S): 20 TABLET, DELAYED RELEASE ORAL at 06:39

## 2022-03-05 RX ADMIN — ISOSORBIDE DINITRATE 10 MILLIGRAM(S): 5 TABLET ORAL at 16:21

## 2022-03-05 RX ADMIN — Medication 6 UNIT(S): at 09:04

## 2022-03-05 RX ADMIN — ATORVASTATIN CALCIUM 80 MILLIGRAM(S): 80 TABLET, FILM COATED ORAL at 21:58

## 2022-03-05 RX ADMIN — Medication 10 MILLIGRAM(S): at 21:58

## 2022-03-05 RX ADMIN — Medication 6 UNIT(S): at 16:21

## 2022-03-05 RX ADMIN — CARVEDILOL PHOSPHATE 6.25 MILLIGRAM(S): 80 CAPSULE, EXTENDED RELEASE ORAL at 06:37

## 2022-03-05 RX ADMIN — SENNA PLUS 2 TABLET(S): 8.6 TABLET ORAL at 21:58

## 2022-03-05 RX ADMIN — TICAGRELOR 90 MILLIGRAM(S): 90 TABLET ORAL at 17:39

## 2022-03-05 RX ADMIN — TICAGRELOR 90 MILLIGRAM(S): 90 TABLET ORAL at 06:37

## 2022-03-05 RX ADMIN — Medication 6: at 16:21

## 2022-03-05 RX ADMIN — GABAPENTIN 200 MILLIGRAM(S): 400 CAPSULE ORAL at 06:37

## 2022-03-05 RX ADMIN — HEPARIN SODIUM 5000 UNIT(S): 5000 INJECTION INTRAVENOUS; SUBCUTANEOUS at 06:46

## 2022-03-05 RX ADMIN — Medication 3 MILLIGRAM(S): at 22:53

## 2022-03-05 RX ADMIN — HEPARIN SODIUM 5000 UNIT(S): 5000 INJECTION INTRAVENOUS; SUBCUTANEOUS at 21:58

## 2022-03-05 NOTE — H&P ADULT - NSHPREVIEWOFSYSTEMS_GEN_ALL_CORE
REVIEW OF SYSTEMS  Constitutional: No fever, No Chills, No fatigue  HEENT: No eye pain, No visual disturbances, No difficulty hearing, No Dysphagia   Pulm: No cough,  No shortness of breath  Cardio: No chest pain, No palpitations  GI:  No abdominal pain, No nausea, No vomiting  : No dysuria, No frequency, No hematuria  Neuro: No headaches, No memory loss, +loss of strength, +numbness feet, No tremors  Skin: No itching, No rashes, No lesions   Endo: No temperature intolerance  MSK: No joint pain, No joint swelling, No muscle pain, No Neck or back pain  Psych:  No depression, No anxiety

## 2022-03-05 NOTE — DISCHARGE NOTE PROVIDER - CARE PROVIDER_API CALL
FOLLOW UP WITH PCP, CARDIOLOGY, ENDO AND UROLOGY,   Phone: (   )    -  Fax: (   )    -  Follow Up Time:

## 2022-03-05 NOTE — DISCHARGE NOTE NURSING/CASE MANAGEMENT/SOCIAL WORK - NSDCPEFALRISK_GEN_ALL_CORE
For information on Fall & Injury Prevention, visit: [email or web address] OR  [email or web address] OR  https://www.cdc.gov/steadi/patient.html

## 2022-03-05 NOTE — PATIENT PROFILE ADULT - FALL HARM RISK - HARM RISK INTERVENTIONS

## 2022-03-05 NOTE — H&P ADULT - NSHPSOCIALHISTORY_GEN_ALL_CORE
Denies smoking, drinking EtOH, and recreation.    Functional Hx:  Lives with son, son's wife, and grandchildren in a house, 3 ROCIO w/ b.l HR. 1st floor set up w/ bedroom and bathroom. Patient used Cane to ambulate, also has RW and Rollator. Family says someone is there to help 24/7. Cooked and was independent with ADLs.    Current Functional Status 3/3/22    Sit-Stand Transfer Training  Sit-to-Stand Transfer Training Rehab Potential: good, to achieve stated therapy goals  Transfer Training Sit-to-Stand Transfer: minimum assist (75% patient effort);  verbal cues;  nonverbal cues (demo/gestures);  1 person assist;  full weight-bearing   rolling walker  Transfer Training Stand-to-Sit Transfer: minimum assist (75% patient effort);  verbal cues;  nonverbal cues (demo/gestures);  1 person assist;  full weight-bearing   rolling walker  Sit-to-Stand Transfer Training Transfer Safety Analysis: decreased balance;  impaired postural control;  decreased strength;  rolling walker    Gait Training  Gait Training Rehab Potential: good, to achieve stated therapy goals  Gait Training: contact guard;  verbal cues;  nonverbal cues (demo/gestures);  1 person assist;  full weight-bearing   rolling walker;  25 feet  Gait Analysis: 3-point gait   decreased step length;  decreased stride length;  decreased strength;  impaired postural control

## 2022-03-05 NOTE — H&P ADULT - ASSESSMENT
Assessment/Plan:  SHAIK DONOHUE is a 88y M with a PMH CAD s/p CABG (1999), PCI stents x 5 (2019), PVD with stent place to Right Posterior Tibial Artery? (2021), T2DM, HTN, HLD, CKD, presented to [facility] on 2/26/22, found to have STEMI, now s/p GEMMA on ASA81 and Brilinta. Course c/b poor glycemic control, encephalopathy, Acute CHF.   Patient now admitted for a multidisciplinary rehab program. 03-05-22 @ 14:08    -------------    Rehab Management/MEDICAL MANAGEMENT     #Cardiopulmonary rehab s/p STEMI  - Gait Instability, ADL impairments and Functional impairments: start Comprehensive Rehab Program of PT/OT  - 3 hours a day, 5 days a week  - P&O as needed     #STEMI s/p GEMMA  - s/p GEMMA to mSVG->OM and POBA proxSVG->OM  - Continue ASA, Brilinta, atorvastatin, Hydralazine, Isordil, Lasix    #DM2  - Lantus 18 qhs + lispro 6 unit premeals   - Endo rec for discharge when done w/ rehab:   - Recommend for discharge Basaglar 20 units SQ qHS, Jardiance 25 mg daily and Tradjenta 5 mg daily    #Sleep  - melatonin PRN     #Skin  - Skin on admission:  - Pressure injury/Skin: OOB to Chair, PT/OT     #Pain Mgmt   #DM Neuropathy  - Tylenol PRN  - gabapentin for DM neuropathy    #GI/Bowel Mgmt   - Continent c/w Senna, Miralax    #/Bladder Mgmt   -  PVR 8h,  CIC >400cc    #FEN   - Diet - Regular + Thins  [CCHO]    #Precautions / PROPHYLAXIS:   - Falls, Cardiac  - ortho: Weight bearing status: WBAT   - Lungs: Aspiration, Incentive Spirometer   - DVT: Brilinta, hep subq        MEDICAL PROGNOSIS: GOOD                                   REHAB POTENTIAL: GOOD  ESTIMATED DISPOSITION: HOME                             ELOS: 10-14 Days   EXPECTED THERAPY:     P.T. 2hr/day       O.T. 1hr/day      S.L.P. 0hr/day      EXP FREQUENCY: 5 days per 7 day period     PRESCREEN COMPARISON: I have reviewed the prescreen information and I have found no relevant changes between the preadmission screening and my post admission evaluation     RATIONALE FOR INPATIENT ADMISSION - Patient demonstrates the following: (check all that apply)  [X] Medically appropriate for rehabilitation admission  [X] Has attainable rehab goals with an appropriate initial discharge plan  [X] Has rehabilitation potential (expected to make a significant improvement within a reasonable period of time)   [X] Requires close medical managment by a rehab physician, rehab nursing care, Hospitalist and comprehensive interdisciplinary team (including PT, OT, & or SLP, Prosthetics and Orthotics)     Assessment/Plan:  SHAIK DONOHUE is a 88y M with a PMH CAD s/p CABG (1999), PCI stents x 5 (2019), PVD with stent place to Right Posterior Tibial Artery? (2021), T2DM, HTN, HLD, CKD, presented to [facility] on 2/26/22, found to have STEMI, now s/p GEMMA on ASA81 and Brilinta. Course c/b poor glycemic control, encephalopathy, Acute CHF.   Patient now admitted for a multidisciplinary rehab program. 03-05-22 @ 14:08    -------------    Rehab Management/MEDICAL MANAGEMENT     #Cardiopulmonary rehab s/p STEMI  - Gait Instability, ADL impairments and Functional impairments: start Comprehensive Rehab Program of PT/OT  - 3 hours a day, 5 days a week  - P&O as needed     #STEMI s/p GEMMA  - s/p GEMMA to mSVG->OM and POBA proxSVG->OM  - Continue ASA, Brilinta, atorvastatin, Hydralazine, Isordil, Lasix    #DM2  - Lantus 18 qhs + lispro 6 unit premeals   - Endo rec for discharge when done w/ rehab:   - Recommend for discharge Basaglar 20 units SQ qHS, Jardiance 25 mg daily and Tradjenta 5 mg daily    #Sleep  - melatonin PRN     #Skin  - Skin on admission:  - Pressure injury/Skin: OOB to Chair, PT/OT     #Pain Mgmt   #DM Neuropathy  - Tylenol PRN  - gabapentin for DM neuropathy    #GI/Bowel Mgmt   - Continent c/w Senna, Miralax    #/Bladder Mgmt   -  PVR 8h,  CIC >400cc    #FEN   - Diet - Regular + Thins  [CCHO, Kosher/Halal, Pescovegeterian]    #Precautions / PROPHYLAXIS:   - Falls, Cardiac  - ortho: Weight bearing status: WBAT   - Lungs: Aspiration, Incentive Spirometer   - DVT: Brilinta, hep subq        MEDICAL PROGNOSIS: GOOD                                   REHAB POTENTIAL: GOOD  ESTIMATED DISPOSITION: HOME                             ELOS: 10-14 Days   EXPECTED THERAPY:     P.T. 2hr/day       O.T. 1hr/day      S.L.P. 0hr/day      EXP FREQUENCY: 5 days per 7 day period     PRESCREEN COMPARISON: I have reviewed the prescreen information and I have found no relevant changes between the preadmission screening and my post admission evaluation     RATIONALE FOR INPATIENT ADMISSION - Patient demonstrates the following: (check all that apply)  [X] Medically appropriate for rehabilitation admission  [X] Has attainable rehab goals with an appropriate initial discharge plan  [X] Has rehabilitation potential (expected to make a significant improvement within a reasonable period of time)   [X] Requires close medical managment by a rehab physician, rehab nursing care, Hospitalist and comprehensive interdisciplinary team (including PT, OT, & or SLP, Prosthetics and Orthotics)

## 2022-03-05 NOTE — DISCHARGE NOTE NURSING/CASE MANAGEMENT/SOCIAL WORK - NSDCFUADDAPPT_GEN_ALL_CORE_FT
Follow up with endocrinology within 1-2 weeks of discharge. You may follow up with Dr. Tessa Ulloa or with Chava at [address] [facility] [place] [place] [address] - [address].  Phone: [phone].     Follow up with urology in 1-2 weeks. You may follow up at [facility] located at [address] [place] [address], [address], [place] [address]. Call [phone] for an appointment.     Follow up with cardiology within 1-2 weeks of discharge. If you are in need of a general cardiologist after discharge, you may contact the [facility] for an appointment at [phone].

## 2022-03-05 NOTE — H&P ADULT - NSHPPHYSICALEXAM_GEN_ALL_CORE
Vital Signs  T(C): 36.7 (03-05-22 @ 14:24), Max: 37.1 (03-05-22 @ 13:00)  HR: 82 (03-05-22 @ 14:24) (79 - 93)  BP: 146/68 (03-05-22 @ 14:24) (113/86 - 149/62)  RR: 16 (03-05-22 @ 14:24) (16 - 18)  SpO2: 99% (03-05-22 @ 14:24) (98% - 100%)    Gen - NAD, Comfortable  HEENT - NCAT, EOMI, dry tongue  Neck - Supple, No limited ROM  Pulm - CTAB  Cardiovascular - RRR, S1S2  Abdomen - Soft, NT/ND, +BS  Extremities - No C/C/E, No calf tenderness; b/l hands w/ Jazmín and [place] nodes at DIP and PIP. Painful DIP and PIP especially at middle finger when fingers are passively flexed.  Neurologic Exam -                    Cognitive - Awake, Alert, AAO to self, place, date, year, situation     Communication - Fluent, No dysarthria     Cranial Nerves - CN 2-12 intact     Motor -                     LEFT    UE - ShAB 4/5, EF 4/5, EE 5/5, WE 5/5,  unable to  due to stiff MCP, DIP and PIP                    RIGHT UE - ShAB 4/5, EF 4/5, EE 5/5, WE 5/5,  unable to  due to stiff MCP, DIP and PIP                    LEFT    LE - HF 4/5, KE 5/5, DF 5/5, PF 5/5                    RIGHT LE - HF 4/5, KE 5/5, DF 5/5, PF 5/5        Sensory - Intact to LT except for b/l feet     Reflexes - DTR Intact, No primitive reflexes     Coordination - FTN intact     Balance - impaired  Psychiatric - Mood stable, Affect WNL  Skin: no sacral decubital wounds

## 2022-03-05 NOTE — CHART NOTE - NSCHARTNOTEFT_GEN_A_CORE
This is an 88 year-old man with past medical history of CAD s/p CABG (1999), PCI stents x 5 (2019), PVD with stent place to Right Posterior Tibial Artery? (2021), T2DM, HTN, HLD, CKD, presented with near syncope and chest pain, admitted for NSTEMI  Consulted for: Uncontrolled T2DM    Contacted by primary team for discharge recommendations for rehab  Plan for basal/oral regimen. At home was on Basaglar 40 BID (was taking once a day recently), Jardiance 25 mg daily and Trajenta 5 mg daily  Inpatient, currently on Lantus 18 units SQ qHS and Admelog 6 units TID with low dose correctional scales  Noted patient serum glucose this morning 147 mg/dl, breakfast  mg/dl     Recommend for discharge Basaglar 20 units SQ qHS, Jardiance 25 mg daily and Tradjenta 5 mg daily  Patient will need further monitoring and titration outpatient at rehab/home. Should monitor FSBG, make dietary modifications and followup with endocrinologist Dr. Tessa Ulloa  Discussed DC recs with primary team ACP    CAPILLARY BLOOD GLUCOSE    POCT Blood Glucose.: 210 mg/dL (05 Mar 2022 08:35)  POCT Blood Glucose.: 313 mg/dL (04 Mar 2022 22:44)  POCT Blood Glucose.: 303 mg/dL (04 Mar 2022 21:36)  POCT Blood Glucose.: 177 mg/dL (04 Mar 2022 17:45)  POCT Blood Glucose.: 236 mg/dL (04 Mar 2022 12:18)      03-05    136  |  102  |  40<H>  ----------------------------<  147<H>  4.3   |  22  |  1.66<H>    Ca    8.7      05 Mar 2022 07:33  Phos  3.9     03-05  Mg     2.30     03-05      MEDICATIONS  (STANDING):  aspirin enteric coated 81 milliGRAM(s) Oral daily  atorvastatin 80 milliGRAM(s) Oral at bedtime  carvedilol 6.25 milliGRAM(s) Oral every 12 hours  dextrose 40% Gel 15 Gram(s) Oral once  dextrose 5%. 1000 milliLiter(s) (50 mL/Hr) IV Continuous <Continuous>  dextrose 5%. 1000 milliLiter(s) (100 mL/Hr) IV Continuous <Continuous>  dextrose 50% Injectable 25 Gram(s) IV Push once  dextrose 50% Injectable 12.5 Gram(s) IV Push once  dextrose 50% Injectable 25 Gram(s) IV Push once  furosemide    Tablet 20 milliGRAM(s) Oral daily  gabapentin 200 milliGRAM(s) Oral two times a day  glucagon  Injectable 1 milliGRAM(s) IntraMuscular once  heparin   Injectable 5000 Unit(s) SubCutaneous every 8 hours  hydrALAZINE 10 milliGRAM(s) Oral three times a day  insulin glargine Injectable (LANTUS) 18 Unit(s) SubCutaneous at bedtime  insulin lispro (ADMELOG) corrective regimen sliding scale   SubCutaneous three times a day before meals  insulin lispro (ADMELOG) corrective regimen sliding scale   SubCutaneous at bedtime  insulin lispro Injectable (ADMELOG) 6 Unit(s) SubCutaneous three times a day before meals  isosorbide   dinitrate Tablet (ISORDIL) 10 milliGRAM(s) Oral three times a day  pantoprazole    Tablet 40 milliGRAM(s) Oral before breakfast  polyethylene glycol 3350 17 Gram(s) Oral daily  senna 2 Tablet(s) Oral at bedtime  ticagrelor 90 milliGRAM(s) Oral every 12 hours    Diet, Consistent Carbohydrate w/Evening Snack:   [employer] Vegetarian (Accepts Fish) (02-26-22 @ 19:57)    A1C with Estimated Average Glucose Result: 8.8 % (02-27-22 @ 02:24)    Ce Narvaez  Nurse Practitioner  Division of Endocrinology & Diabetes  In house pager #[phone]/long range pager #[phone]    If before 9AM or after 6PM, or on weekends/holidays, please call endocrine answering service for assistance ([phone]).  For nonurgent matters email [email or web address] for assistance.

## 2022-03-05 NOTE — DISCHARGE NOTE PROVIDER - HOSPITAL COURSE
87 y/o male with PMHx of CAD/MI s/p CABG/stents, PAD s/p RLE stent, HTN, DM type 2 A1c 8.8, CKD stage 3, CVA - no residual symptoms who presented with NSTEMI with course c/b acute chronic systolic CHF, MABLE, hyperglycemia, acute encephalopathy after dose of xanax.     Problem/Plan - 1:  ·  Problem: NSTEMI (non-ST elevation myocardial infarction).   ·  Plan: STEMI s/p GEMMA to mSVG->OM and POBA proxSVG->OM  - Continue ASA, Brilinta, atorvastatin, Hydralazine, Isordil, Lasix   - Cardiology following   [ ] OP Cards evaluation   - PT/OT/PMR: Rehab, acute v subacute.     Problem/Plan - 2:  ·  Problem: DM (diabetes mellitus), type 2.   ·  Plan: A1C 8.8   Cont Lantus 18 units Qhs + Lispro 6 units w/ meals + CDI   [ ] D/C regimen: Lantus __, Jardiance 25mg QD, Tradjenta 5mg QD   - Endocrine following   [ ] OP Endo follow up w/ Dr. Tessa Ulloa.     Problem/Plan - 3:  ·  Problem: Renal lesion.   ·  Plan: Lesion seen on renal U/S, discussed with Urology- can follow up OP for MRI and w/ Urology   [ ] OP Urology follow up.     Problem/Plan - 4:  ·  Problem: Acute systolic congestive heart failure.   ·  Plan: Continue ASA, Brillenta, Coreg, isordil, hydralazine, lasix.     Problem/Plan - 5:  ·  Problem: Acute kidney injury superimposed on CKD.   ·  Plan: Cr improving, continue to monitor   Avoid nephrotoxic agents.     Problem/Plan - 6:  ·  Problem: Acute encephalopathy.   ·  Plan: Now resolved, likely due to dose of xanax +/- delirium as patient did not sleep   Will avoid benzos.    # Constipation   - Cont senna, Miralax   - Ducolax x 1 today.     87 y/o male with PMHx of CAD/MI s/p CABG/stents, PAD s/p RLE stent, HTN, DM type 2 A1c 8.8, CKD stage 3, CVA - no residual symptoms who presented with NSTEMI with course c/b acute chronic systolic CHF, MABLE, hyperglycemia, acute encephalopathy after dose of xanax.    1. NSTEMI: s/p GEMMA to mSVG->OM and POBA proxSVG->OM on 2/26. Received 1x Lasix IVP 40mg after procedure for crackles/wheezing - now resolved. To continue medical therapy with ASA, Brilinta, atorvastatin, Hydralazine, Isordil, Lasix.     2. DM2: A1C 8.8   Cont Lantus 18 units Qhs + Lispro 6 units w/ meals + CDI   [ ] D/C regimen: Lantus __, Jardiance 25mg QD, Tradjenta 5mg QD   - Endocrine following   [ ] OP Endo follow up w/ Dr. Tessa Ulloa.     Problem/Plan - 3:  ·  Problem: Renal lesion.   ·  Plan: Lesion seen on renal U/S, discussed with Urology- can follow up OP for MRI and w/ Urology   [ ] OP Urology follow up.     Problem/Plan - 4:  ·  Problem: Acute systolic congestive heart failure.   ·  Plan: Continue ASA, Brillenta, Coreg, isordil, hydralazine, lasix.     Problem/Plan - 5:  ·  Problem: Acute kidney injury superimposed on CKD.   ·  Plan: Cr improving, continue to monitor   Avoid nephrotoxic agents.     Problem/Plan - 6:  ·  Problem: Acute encephalopathy.   ·  Plan: Now resolved, likely due to dose of xanax +/- delirium as patient did not sleep   Will avoid benzos.    # Constipation   - Cont senna, Miralax   - Ducolax x 1 today.     87 y/o male with PMHx of CAD/MI s/p CABG/stents, PAD s/p RLE stent, HTN, DM type 2 A1c 8.8, CKD stage 3, CVA - no residual symptoms who presented with NSTEMI with course c/b acute chronic systolic CHF, MABLE, hyperglycemia, acute encephalopathy after dose of xanax.    1. NSTEMI: s/p GEMMA to mSVG->OM and POBA proxSVG->OM on 2/26. Received 1x Lasix IVP 40mg after procedure for crackles/wheezing - now resolved. To continue medical therapy with ASA, Brilinta, atorvastatin, Hydralazine, Isordil, Lasix.     2. DM2: A1C 8.8: Seen by endocrine - to discharge on Lantus 20U at bedtime, Jardiance 25mg QD, Tradjenta 5mg QD. Outpatient endo follow up w/ Dr. Tessa Ulloa. Patient will need uptitration at rehab vs outpatient.     3. Renal lesion. - Underwent Renal US for MABLE - incidentally found to have lesion on left upper pole of kidney - discussed with Urology- can follow up outpatient for MRI at [facility]. Cr improving.     4. Acute systolic congestive heart failure: Continue ASA, Brillenta, Coreg, isordil, hydralazine, lasix.    5.  Acute encephalopathy: Now resolved, likely due to dose of xanax +/- delirium as patient did not sleep     Medically cleared/stable for discharge as per Dr. De La O with close outpatient follow up within 1-2 weeks of discharge. Patient understands and agrees with plan of care. 87 y/o male with PMHx of CAD/MI s/p CABG/stents, PAD s/p RLE stent, HTN, DM type 2 A1c 8.8, CKD stage 3, CVA - no residual symptoms who presented with STEMI with course c/b acute chronic systolic CHF, MABLE, hyperglycemia, acute encephalopathy after dose of xanax.     Patient presented to the hospital with chest pain and was found to have elevated Troponins found to have ST elevations in multiple leads c/w STEMI. He underwent emergent LHC s/p GEMMA . Patient was short of breath during the procedure and was given Lasix with resolution of symptoms. IAMP was placed for HD support in setting of angina, LV dysfunction and elevated LVEDP. IABP was able to be removed on 2/28 without complication. Patient was started on goal directed medical therapy. Course was complicated by MABLE likely 2/2 BERENICE and STEMI. Patient also found to have uncontrolled DM2 and was started on Insulin by Endocrine. As part of MABLE workup, patient underwent a renal US which demonstrated a left upper pole renal lesion. He was recommended to undergo MRI kidney as an outpatient and follow up with Urology.     1. NSTEMI: s/p GEMMA to mSVG->OM and POBA proxSVG->OM on 2/26. Received 1x Lasix IVP 40mg after procedure for crackles/wheezing - now resolved. To continue medical therapy with ASA, Brilinta, atorvastatin, Hydralazine, Isordil, Lasix. Patient will need outpatient Cardiology follow up within 1-2 weeks.     2. DM2: A1C 8.8: Seen by endocrine - to discharge on Lantus 20U at bedtime, Jardiance 25mg QD, Tradjenta 5mg QD. Outpatient endo follow up w/ Dr. Tessa Ulloa. Patient will need uptitration at rehab vs outpatient.     3. Renal lesion. - Underwent Renal US for MABLE - incidentally found to have lesion on left upper pole of kidney - discussed with Urology- can follow up outpatient for MRI at [facility]. Cr improving.     4. Acute systolic congestive heart failure: Continue ASA, Brillenta, Coreg, isordil, hydralazine, lasix.    5.  Acute encephalopathy: Now resolved, likely due to dose of xanax +/- delirium as patient did not sleep     Patient seen and examined by me on 3/5 and medically stable for discharge. Updated children at bedside.

## 2022-03-05 NOTE — DISCHARGE NOTE PROVIDER - NSDCMRMEDTOKEN_GEN_ALL_CORE_FT
Chronic, stable, well-controlled, taking medication as directed.  Patient is requesting medication change as she is having a chronic nonproductive cough that she can explain is not related to URI otherwise.    No signs of angioedema (lip swelling, sensation of throat closure, airway compromise, wheezing, respiratory distress, hives, facial swelling).    ROS is NEGATIVE for dizziness, generalized weakness/fatigue, cold sweats,  vision/hearing changes, jaw pain/paresthesias, BUE pain/paresthesias/numbness/weakness, chest pain/pressure, palpitations, dyspnea, nausea, RUQ abdominal pain, oliguria/anuria, BLE edema.    ROS is NEGATIVE for fevers, chills, bilateral ear congestion/pain, sinus headaches, cough, tender cervical lymph nodes, dysphagia, tachypnea, dyspnea, chest congestion, wheezing/rhonchi/rales, nausea, emesis, loose stools/diarrhea, myalgias, rash.    ammonium lactate 12% topical cream: Apply topically to affected area 2 times a day  aspirin 81 mg oral delayed release tablet: 1 tab(s) orally once a day  atorvastatin 80 mg oral tablet: 1 tab(s) orally once a day (at bedtime)  Basaglar KwikPen 100 units/mL subcutaneous solution: 35 unit(s) subcutaneous once a day (at bedtime)  carvedilol 6.25 mg oral tablet: 1 tab(s) orally every 12 hours  Dexilant 60 mg oral delayed release capsule: 1 cap(s) orally once a day  furosemide 20 mg oral tablet: 1 tab(s) orally once a day  gabapentin 100 mg oral capsule: 2 cap(s) orally 2 times a day  gabapentin 400 mg oral tablet: 400 milligram(s) orally once a day  hydrALAZINE 10 mg oral tablet: 1 tab(s) orally 3 times a day  isosorbide dinitrate 10 mg oral tablet: 1 tab(s) orally 3 times a day  Jardiance 25 mg oral tablet: 1 tab(s) orally once a day (in the morning)  ketoconazole 2% topical cream: Apply topically to affected area once a day  Linzess 145 mcg oral capsule: 1 cap(s) orally once a day  Metamucil [employer] Fiber Sugar-free 3.4 g/5.4 g oral powder for reconstitution: 1 application orally once a day  polyethylene glycol 3350 oral powder for reconstitution: 17 gram(s) orally once a day  rosuvastatin 40 mg oral tablet: 1 tab(s) orally once a day  senna oral tablet: 2 tab(s) orally once a day (at bedtime)  ticagrelor 90 mg oral tablet: 1 tab(s) orally every 12 hours  Tradjenta 5 mg oral tablet: 1 tab(s) orally once a day  Vitamin D3 25 mcg (1000 intl units) oral capsule: 1 cap(s) orally once a day   aspirin 81 mg oral delayed release tablet: 1 tab(s) orally once a day  atorvastatin 80 mg oral tablet: 1 tab(s) orally once a day (at bedtime)  Basaglar KwikPen 100 units/mL subcutaneous solution: 20 unit(s) subcutaneous once a day (at bedtime)  carvedilol 6.25 mg oral tablet: 1 tab(s) orally every 12 hours  Dexilant 60 mg oral delayed release capsule: 1 cap(s) orally once a day  furosemide 20 mg oral tablet: 1 tab(s) orally once a day  gabapentin 100 mg oral capsule: 2 cap(s) orally 2 times a day  hydrALAZINE 10 mg oral tablet: 1 tab(s) orally 3 times a day  isosorbide dinitrate 10 mg oral tablet: 1 tab(s) orally 3 times a day  Jardiance 25 mg oral tablet: 1 tab(s) orally once a day (in the morning)  Linzess 145 mcg oral capsule: 1 cap(s) orally once a day  polyethylene glycol 3350 oral powder for reconstitution: 17 gram(s) orally once a day  senna oral tablet: 2 tab(s) orally once a day (at bedtime)  ticagrelor 90 mg oral tablet: 1 tab(s) orally every 12 hours  Tradjenta 5 mg oral tablet: 1 tab(s) orally once a day  Vitamin D3 25 mcg (1000 intl units) oral capsule: 1 cap(s) orally once a day

## 2022-03-05 NOTE — CHART NOTE - NSCHARTNOTESELECT_GEN_ALL_CORE
Endocrinology
MAR Accept Note/Transfer Note
CCU Transfer Note/Transfer Note
Event Note
IABP Removal/Event Note

## 2022-03-05 NOTE — H&P ADULT - ATTENDING COMMENTS
Seen and examined, findings as noted with additions below    88y M, Hx CAD s/p CABG (1999), PCI stents x 5 (2019), PVD with stent place to Right Posterior Tibial Artery? (2021), T2DM, HTN, HLD, CKD,   Acute care admission 2/26 after presenting with Chest pain, Dxed with STEMI, now s/p x 2 GEMMA Superficial Venous  and IABP. Treated for hypoglycemia and hyperkalemia preprocedure, progression of finger arthritis noted post procedure with difficulty opening his hands. Otherwise had an uneventful procedure and post op Acute rehab admit 3/5    Reports good family support, daughter in law is a physician  Has 3 stairs to bedroom, ambulating without aid at home and with cane outside home    Now requiring min assistance for transfers, ambulating onl 25ft with RW    Clinically stable  AAO X 3  MMT 4/5 globally    Lab Cr 1.7, stable Cr 12.7    Dx: Debility post STEMI S/P Cartiac stenting  Commence PT/OT--Addressing deficits especially hand arthritis, balance and gait    Continue DAPT, and cardiology f/u, confirm duration  DVT ppx with heparin  Pain mgt to continue  Bowel/bladder care     F/u with cardiology and PCP  Continue management of all comorbid conditions  Est dc as in care conference

## 2022-03-05 NOTE — H&P ADULT - HISTORY OF PRESENT ILLNESS
88 year-old man with past medical history of CAD s/p CABG (1999), PCI stents x 5 (2019), PVD with stent place to Right Posterior Tibial Artery? (2021), T2DM, HTN, HLD, CKD, presented with near syncope and chest pain.  Patient's son states he has been feeling generalized fatigue for past few weeks but this morning nearly collapsed when standing and developed chest pressure.  Called his cardiologist, Dr. Corona, who advised he go to ED for evaluation.  Pt continues to report chest discomfort and generalized fatigue.  Was due for dental appt this morning and has not taken asa for few days. In ED EKG showed ROCIO in aVR ,STD in I, aVL,V2 to V6 with 1st degree /LAFB/ RBBB. Received asa 325mg PO x1 , Brilinta 180mg PO x1 and heparin drip for ACS. Trop 143. Received insulin/dex 50% and mike gluconate IV and lokema  for K+ 5.9. He was taken to cath lab for ongoing chest discomfort.  Patient went urgently to cath lab for sten to SVG to OM1 with IABP placed. Patient had IABP removed and s/p Drug eluting stent, now on ASA81 and Brilinta. Downgraded from CCU. Patient also c/o difficulty closing bilateral hands 2/2 worsening arthritis. Seen by PM&R and deemed candidate for acute inpatient rehab. Pt admitted to [facility] for acute rehab on 3/5/22.

## 2022-03-05 NOTE — DISCHARGE NOTE PROVIDER - NSDCCPCAREPLAN_GEN_ALL_CORE_FT
PRINCIPAL DISCHARGE DIAGNOSIS  Diagnosis: ACS (acute coronary syndrome)  Assessment and Plan of Treatment: You underwent a left heart angiogram and received 1 stent. To continue medical therapy with ASA, Brilinta, atorvastatin, Hydralazine, Isordil, Lasix. Follow up with cardiology within 1-2 weeks of discharge. If you are in need of a general cardiologist after discharge, you may contact the [facility] for an appointment at [phone].      SECONDARY DISCHARGE DIAGNOSES  Diagnosis: Renal lesion  Assessment and Plan of Treatment: You underwent a kidney ultrasound and incidentally found to have a lesion on your left kidney. You will need an outpatient MRI and outpatient follow up with urology. Follow up with urology in 3-4 weeks. You may follow up at [facility] located at [address] [place] [address], [address] [facility], [place] [address]. Call [phone] for an appointment.       Diagnosis: DM (diabetes mellitus), type 2  Assessment and Plan of Treatment: You were seen by endocrine. For discharge, you will need to take Lantus 20Units  at bedtime, Jardiance 25mg daily and Tradjenta 5mg daiky. Your Lantus may need to be adjusted - outpatient endo follow up w/ Dr. Tessa Ulloa.

## 2022-03-05 NOTE — DISCHARGE NOTE NURSING/CASE MANAGEMENT/SOCIAL WORK - PATIENT PORTAL LINK FT
You can access the FollowMyHealth Patient Portal offered by [facility] by registering at the following website: [email or web address]. By joining [facility] FollowMyHealth portal, you will also be able to view your health information using other applications (apps) compatible with our system.

## 2022-03-05 NOTE — DISCHARGE NOTE PROVIDER - NSDCFUADDAPPT_GEN_ALL_CORE_FT
Follow up with endocrinology within 1-2 weeks of discharge. You may follow up with Dr. Tessa Ulloa or with Chava at [address] [facility] [place] [address] - [address].  Phone: [phone].     Follow up with urology in 1-2 weeks. You may follow up at [facility] located at [address] [place] [address], [address], [place] [address]. Call [phone] for an appointment.     Follow up with cardiology within 1-2 weeks of discharge. If you are in need of a general cardiologist after discharge, you may contact the [facility] for an appointment at [phone].

## 2022-03-06 LAB
ALBUMIN SERPL ELPH-MCNC: 3.3 G/DL — SIGNIFICANT CHANGE UP (ref 3.3–5)
ALP SERPL-CCNC: 56 U/L — SIGNIFICANT CHANGE UP (ref 40–120)
ALT FLD-CCNC: 59 U/L — HIGH (ref 10–45)
ANION GAP SERPL CALC-SCNC: 9 MMOL/L — SIGNIFICANT CHANGE UP (ref 5–17)
AST SERPL-CCNC: 36 U/L — SIGNIFICANT CHANGE UP (ref 10–40)
BASOPHILS # BLD AUTO: 0.06 K/UL — SIGNIFICANT CHANGE UP (ref 0–0.2)
BASOPHILS NFR BLD AUTO: 0.8 % — SIGNIFICANT CHANGE UP (ref 0–2)
BILIRUB SERPL-MCNC: 0.5 MG/DL — SIGNIFICANT CHANGE UP (ref 0.2–1.2)
BUN SERPL-MCNC: 37 MG/DL — HIGH (ref 7–23)
CALCIUM SERPL-MCNC: 8.6 MG/DL — SIGNIFICANT CHANGE UP (ref 8.4–10.5)
CHLORIDE SERPL-SCNC: 106 MMOL/L — SIGNIFICANT CHANGE UP (ref 96–108)
CO2 SERPL-SCNC: 24 MMOL/L — SIGNIFICANT CHANGE UP (ref 22–31)
CREAT SERPL-MCNC: 1.72 MG/DL — HIGH (ref 0.5–1.3)
EGFR: 38 ML/MIN/1.73M2 — LOW
EOSINOPHIL # BLD AUTO: 0.35 K/UL — SIGNIFICANT CHANGE UP (ref 0–0.5)
EOSINOPHIL NFR BLD AUTO: 4.4 % — SIGNIFICANT CHANGE UP (ref 0–6)
GLUCOSE BLDC GLUCOMTR-MCNC: 147 MG/DL — HIGH (ref 70–99)
GLUCOSE BLDC GLUCOMTR-MCNC: 179 MG/DL — HIGH (ref 70–99)
GLUCOSE BLDC GLUCOMTR-MCNC: 225 MG/DL — HIGH (ref 70–99)
GLUCOSE BLDC GLUCOMTR-MCNC: 237 MG/DL — HIGH (ref 70–99)
GLUCOSE SERPL-MCNC: 137 MG/DL — HIGH (ref 70–99)
HCT VFR BLD CALC: 38.4 % — LOW (ref 39–50)
HGB BLD-MCNC: 12.7 G/DL — LOW (ref 13–17)
IMM GRANULOCYTES NFR BLD AUTO: 1 % — SIGNIFICANT CHANGE UP (ref 0–1.5)
LYMPHOCYTES # BLD AUTO: 1.86 K/UL — SIGNIFICANT CHANGE UP (ref 1–3.3)
LYMPHOCYTES # BLD AUTO: 23.4 % — SIGNIFICANT CHANGE UP (ref 13–44)
MCHC RBC-ENTMCNC: 30.5 PG — SIGNIFICANT CHANGE UP (ref 27–34)
MCHC RBC-ENTMCNC: 33.1 GM/DL — SIGNIFICANT CHANGE UP (ref 32–36)
MCV RBC AUTO: 92.1 FL — SIGNIFICANT CHANGE UP (ref 80–100)
MONOCYTES # BLD AUTO: 0.88 K/UL — SIGNIFICANT CHANGE UP (ref 0–0.9)
MONOCYTES NFR BLD AUTO: 11.1 % — SIGNIFICANT CHANGE UP (ref 2–14)
NEUTROPHILS # BLD AUTO: 4.72 K/UL — SIGNIFICANT CHANGE UP (ref 1.8–7.4)
NEUTROPHILS NFR BLD AUTO: 59.3 % — SIGNIFICANT CHANGE UP (ref 43–77)
NRBC # BLD: 0 /100 WBCS — SIGNIFICANT CHANGE UP (ref 0–0)
PLATELET # BLD AUTO: 207 K/UL — SIGNIFICANT CHANGE UP (ref 150–400)
POTASSIUM SERPL-MCNC: 4.4 MMOL/L — SIGNIFICANT CHANGE UP (ref 3.5–5.3)
POTASSIUM SERPL-SCNC: 4.4 MMOL/L — SIGNIFICANT CHANGE UP (ref 3.5–5.3)
PROT SERPL-MCNC: 7.1 G/DL — SIGNIFICANT CHANGE UP (ref 6–8.3)
RBC # BLD: 4.17 M/UL — LOW (ref 4.2–5.8)
RBC # FLD: 14.1 % — SIGNIFICANT CHANGE UP (ref 10.3–14.5)
SODIUM SERPL-SCNC: 139 MMOL/L — SIGNIFICANT CHANGE UP (ref 135–145)
WBC # BLD: 7.95 K/UL — SIGNIFICANT CHANGE UP (ref 3.8–10.5)
WBC # FLD AUTO: 7.95 K/UL — SIGNIFICANT CHANGE UP (ref 3.8–10.5)

## 2022-03-06 PROCEDURE — 99223 1ST HOSP IP/OBS HIGH 75: CPT

## 2022-03-06 RX ORDER — INSULIN GLARGINE 100 [IU]/ML
22 INJECTION, SOLUTION SUBCUTANEOUS AT BEDTIME
Refills: 0 | Status: DISCONTINUED | OUTPATIENT
Start: 2022-03-06 | End: 2022-03-08

## 2022-03-06 RX ORDER — MULTIVIT-MIN/FERROUS GLUCONATE 9 MG/15 ML
1 LIQUID (ML) ORAL DAILY
Refills: 0 | Status: CANCELLED | OUTPATIENT
Start: 2022-03-06 | End: 2022-03-10

## 2022-03-06 RX ADMIN — Medication 10 MILLIGRAM(S): at 12:19

## 2022-03-06 RX ADMIN — HEPARIN SODIUM 5000 UNIT(S): 5000 INJECTION INTRAVENOUS; SUBCUTANEOUS at 21:23

## 2022-03-06 RX ADMIN — ISOSORBIDE DINITRATE 10 MILLIGRAM(S): 5 TABLET ORAL at 12:18

## 2022-03-06 RX ADMIN — INSULIN GLARGINE 22 UNIT(S): 100 INJECTION, SOLUTION SUBCUTANEOUS at 21:31

## 2022-03-06 RX ADMIN — Medication 4: at 08:04

## 2022-03-06 RX ADMIN — Medication 20 MILLIGRAM(S): at 05:39

## 2022-03-06 RX ADMIN — Medication 6 UNIT(S): at 12:18

## 2022-03-06 RX ADMIN — Medication 81 MILLIGRAM(S): at 12:17

## 2022-03-06 RX ADMIN — Medication 6 UNIT(S): at 08:04

## 2022-03-06 RX ADMIN — ISOSORBIDE DINITRATE 10 MILLIGRAM(S): 5 TABLET ORAL at 05:39

## 2022-03-06 RX ADMIN — ATORVASTATIN CALCIUM 80 MILLIGRAM(S): 80 TABLET, FILM COATED ORAL at 21:23

## 2022-03-06 RX ADMIN — HEPARIN SODIUM 5000 UNIT(S): 5000 INJECTION INTRAVENOUS; SUBCUTANEOUS at 05:39

## 2022-03-06 RX ADMIN — POLYETHYLENE GLYCOL 3350 17 GRAM(S): 17 POWDER, FOR SOLUTION ORAL at 12:18

## 2022-03-06 RX ADMIN — CARVEDILOL PHOSPHATE 6.25 MILLIGRAM(S): 80 CAPSULE, EXTENDED RELEASE ORAL at 17:49

## 2022-03-06 RX ADMIN — Medication 6 UNIT(S): at 17:49

## 2022-03-06 RX ADMIN — Medication 10 MILLIGRAM(S): at 21:24

## 2022-03-06 RX ADMIN — Medication 4: at 12:18

## 2022-03-06 RX ADMIN — GABAPENTIN 200 MILLIGRAM(S): 400 CAPSULE ORAL at 05:39

## 2022-03-06 RX ADMIN — TICAGRELOR 90 MILLIGRAM(S): 90 TABLET ORAL at 17:49

## 2022-03-06 RX ADMIN — PANTOPRAZOLE SODIUM 40 MILLIGRAM(S): 20 TABLET, DELAYED RELEASE ORAL at 05:39

## 2022-03-06 RX ADMIN — CARVEDILOL PHOSPHATE 6.25 MILLIGRAM(S): 80 CAPSULE, EXTENDED RELEASE ORAL at 05:39

## 2022-03-06 RX ADMIN — HEPARIN SODIUM 5000 UNIT(S): 5000 INJECTION INTRAVENOUS; SUBCUTANEOUS at 12:20

## 2022-03-06 RX ADMIN — TICAGRELOR 90 MILLIGRAM(S): 90 TABLET ORAL at 05:39

## 2022-03-06 RX ADMIN — GABAPENTIN 200 MILLIGRAM(S): 400 CAPSULE ORAL at 17:49

## 2022-03-06 RX ADMIN — SENNA PLUS 2 TABLET(S): 8.6 TABLET ORAL at 21:23

## 2022-03-06 RX ADMIN — ISOSORBIDE DINITRATE 10 MILLIGRAM(S): 5 TABLET ORAL at 17:49

## 2022-03-06 RX ADMIN — Medication 10 MILLIGRAM(S): at 05:39

## 2022-03-06 NOTE — DIETITIAN INITIAL EVALUATION ADULT. - PERTINENT LABORATORY DATA
3/6  Na-139  K-4.4  BUN-37H  Creat-1.72H  Gluc-137H   Hemoglobin A1c - 8.8H  POCT (over Last 3 Days) - Ranging from 177-313

## 2022-03-06 NOTE — DIETITIAN INITIAL EVALUATION ADULT. - ORAL INTAKE PTA/DIET HISTORY
Patient Does Follow Kosher Diet @Home, Consumes 3 Meals a Day, Family Usually Cooks For Patient & Does Take Vitamin/Supplements @Home (Multivitamin, Vitamin D, Vitamin E, Zinc)    Consistent Carbohydrate Diet w/ Thin Liquids (IDDSI Level 0)   Declines Nutrition Supplementation - Recommend Initiate Multivitamin Daily  Education Provided on Proper Nutrition

## 2022-03-06 NOTE — DIETITIAN INITIAL EVALUATION ADULT. - ADD RECOMMEND
1) Monitor Weights, Intake, Tolerance, Skin, POCT & Labwork   2) Multivitamin Daily 3) Education Provided on Proper Nutrition 4) Continue Nutrition Plan of Care

## 2022-03-06 NOTE — CONSULT NOTE ADULT - ASSESSMENT
88 y/p M with a PMH CAD s/p CABG (1999), PCI stents x 5 (2019), PVD with stent place to Right Posterior Tibial Artery? (2021), T2DM, HTN, HLD, CKD, presented to [facility] on 2/26/22, found to have STEMI, now s/p GEMMA on ASA81 and Brilinta. Course c/b poor glycemic control, encephalopathy, Acute CHF.   Patient now admitted for a multidisciplinary rehab program. 03-05-22 @ 14:08    Rehab: Functional and gait instability:  - C/w PT/OT per primary team     CVS: Cardiopulmonary rehab s/p STEMI, Hx Of PVD, Hx of CABG, #STEMI s/p GEMMA, HTN, HLD  - S/p  GEMMA to mSVG->OM and POBA proxSVG->OM  - Continue ASA 91 mg PO QD, Brilinta 90 mg PO BID, atorvastatin 80 mg PO QHS  - For BP, C/w Coreg 6.25 mg PO BID, hydralazine 10 mg PO TID, Isordil 10 mg PO TID  - C/w Lasix 20 mg PO QD for volume overload   - LVEF 45 % based on previous TTE although ut was limited     Endo: DM2, hyperglycemia, diabetic neuropathy  - Will increase Lantus 18 qhs to 22 U; c/w lispro 6 unit premeals   - Endo rec for discharge when done w/ rehab   - Recommend for discharge Basaglar 20 units SQ qHS, Jardiance 25 mg daily and Tradjenta 5 mg daily  -  C/w gabapentin 200 mg po BID    GI PPI: Protonic 40 mg PO   DVT PPx: Heparin 5000 SUBQ TID

## 2022-03-06 NOTE — DIETITIAN NUTRITION RISK NOTIFICATION - TREATMENT: THE FOLLOWING DIET HAS BEEN RECOMMENDED
Declines Nutrition Supplementation   Recommend Initiate Multivitamin   Education Provided on Proper Nutrition

## 2022-03-06 NOTE — DIETITIAN INITIAL EVALUATION ADULT. - OTHER INFO
Initial Nutrition Assessment   88yr Old Male   Dx: STEMI  Diet - Consistent Carbohydrate Diet w/ Thin Liquids (IDDSI Level 0)   Denies Food Allergy/Intolerance  Tolerates Diet Well - No Chewing/Swallowing Complications (Per Patient)  Consumed % of 1st Meal (as Per Documentation)  No Pressure Ulcers (as Per Nursing Flow Sheets)  No Edema Noted (as Per Nursing Flow Sheets)  No Recent Nausea/Vomiting/Diarrhea & Some Recent Constipation (as Per Patient)

## 2022-03-06 NOTE — DIETITIAN INITIAL EVALUATION ADULT. - PHYSCIAL ASSESSMENT
Temporalis, Orbital Fat Pads, Buccal Fat Pads, Clavicle, Tricep, Quadricep, Gastrocnemius(Calf) & Hand (Interosseous) Wasting Observed  (Per Nutrition Focused Physical Exam)

## 2022-03-06 NOTE — DIETITIAN INITIAL EVALUATION ADULT. - PERTINENT MEDS FT
ASA, Heparin, Lantus, Admelog, Lasix, Protonix, Lipitor, Coreg, Gabapentin, Hydralazine, Senna, Melatonin  Recommend Initiate Multivitamin Daily

## 2022-03-07 LAB
ALBUMIN SERPL ELPH-MCNC: 3.8 G/DL — SIGNIFICANT CHANGE UP (ref 3.3–5)
ALP SERPL-CCNC: 58 U/L — SIGNIFICANT CHANGE UP (ref 40–120)
ALT FLD-CCNC: 75 U/L — HIGH (ref 10–45)
ANION GAP SERPL CALC-SCNC: 12 MMOL/L — SIGNIFICANT CHANGE UP (ref 5–17)
AST SERPL-CCNC: 48 U/L — HIGH (ref 10–40)
BASOPHILS # BLD AUTO: 0.07 K/UL — SIGNIFICANT CHANGE UP (ref 0–0.2)
BASOPHILS NFR BLD AUTO: 0.8 % — SIGNIFICANT CHANGE UP (ref 0–2)
BILIRUB SERPL-MCNC: 0.6 MG/DL — SIGNIFICANT CHANGE UP (ref 0.2–1.2)
BUN SERPL-MCNC: 38 MG/DL — HIGH (ref 7–23)
CALCIUM SERPL-MCNC: 9.1 MG/DL — SIGNIFICANT CHANGE UP (ref 8.4–10.5)
CHLORIDE SERPL-SCNC: 103 MMOL/L — SIGNIFICANT CHANGE UP (ref 96–108)
CO2 SERPL-SCNC: 23 MMOL/L — SIGNIFICANT CHANGE UP (ref 22–31)
CREAT SERPL-MCNC: 1.78 MG/DL — HIGH (ref 0.5–1.3)
EGFR: 36 ML/MIN/1.73M2 — LOW
EOSINOPHIL # BLD AUTO: 0.41 K/UL — SIGNIFICANT CHANGE UP (ref 0–0.5)
EOSINOPHIL NFR BLD AUTO: 4.5 % — SIGNIFICANT CHANGE UP (ref 0–6)
GLUCOSE BLDC GLUCOMTR-MCNC: 193 MG/DL — HIGH (ref 70–99)
GLUCOSE BLDC GLUCOMTR-MCNC: 196 MG/DL — HIGH (ref 70–99)
GLUCOSE BLDC GLUCOMTR-MCNC: 200 MG/DL — HIGH (ref 70–99)
GLUCOSE BLDC GLUCOMTR-MCNC: 275 MG/DL — HIGH (ref 70–99)
GLUCOSE SERPL-MCNC: 123 MG/DL — HIGH (ref 70–99)
HCT VFR BLD CALC: 39.5 % — SIGNIFICANT CHANGE UP (ref 39–50)
HGB BLD-MCNC: 13 G/DL — SIGNIFICANT CHANGE UP (ref 13–17)
IMM GRANULOCYTES NFR BLD AUTO: 0.7 % — SIGNIFICANT CHANGE UP (ref 0–1.5)
LYMPHOCYTES # BLD AUTO: 1.94 K/UL — SIGNIFICANT CHANGE UP (ref 1–3.3)
LYMPHOCYTES # BLD AUTO: 21.2 % — SIGNIFICANT CHANGE UP (ref 13–44)
MCHC RBC-ENTMCNC: 30.4 PG — SIGNIFICANT CHANGE UP (ref 27–34)
MCHC RBC-ENTMCNC: 32.9 GM/DL — SIGNIFICANT CHANGE UP (ref 32–36)
MCV RBC AUTO: 92.3 FL — SIGNIFICANT CHANGE UP (ref 80–100)
MONOCYTES # BLD AUTO: 0.86 K/UL — SIGNIFICANT CHANGE UP (ref 0–0.9)
MONOCYTES NFR BLD AUTO: 9.4 % — SIGNIFICANT CHANGE UP (ref 2–14)
NEUTROPHILS # BLD AUTO: 5.79 K/UL — SIGNIFICANT CHANGE UP (ref 1.8–7.4)
NEUTROPHILS NFR BLD AUTO: 63.4 % — SIGNIFICANT CHANGE UP (ref 43–77)
NRBC # BLD: 0 /100 WBCS — SIGNIFICANT CHANGE UP (ref 0–0)
PLATELET # BLD AUTO: 238 K/UL — SIGNIFICANT CHANGE UP (ref 150–400)
POTASSIUM SERPL-MCNC: 4.5 MMOL/L — SIGNIFICANT CHANGE UP (ref 3.5–5.3)
POTASSIUM SERPL-SCNC: 4.5 MMOL/L — SIGNIFICANT CHANGE UP (ref 3.5–5.3)
PROT SERPL-MCNC: 7.7 G/DL — SIGNIFICANT CHANGE UP (ref 6–8.3)
RBC # BLD: 4.28 M/UL — SIGNIFICANT CHANGE UP (ref 4.2–5.8)
RBC # FLD: 14.5 % — SIGNIFICANT CHANGE UP (ref 10.3–14.5)
SODIUM SERPL-SCNC: 138 MMOL/L — SIGNIFICANT CHANGE UP (ref 135–145)
WBC # BLD: 9.13 K/UL — SIGNIFICANT CHANGE UP (ref 3.8–10.5)
WBC # FLD AUTO: 9.13 K/UL — SIGNIFICANT CHANGE UP (ref 3.8–10.5)

## 2022-03-07 PROCEDURE — 99233 SBSQ HOSP IP/OBS HIGH 50: CPT

## 2022-03-07 RX ADMIN — ISOSORBIDE DINITRATE 10 MILLIGRAM(S): 5 TABLET ORAL at 06:03

## 2022-03-07 RX ADMIN — Medication 81 MILLIGRAM(S): at 11:30

## 2022-03-07 RX ADMIN — HEPARIN SODIUM 5000 UNIT(S): 5000 INJECTION INTRAVENOUS; SUBCUTANEOUS at 21:30

## 2022-03-07 RX ADMIN — GABAPENTIN 200 MILLIGRAM(S): 400 CAPSULE ORAL at 06:02

## 2022-03-07 RX ADMIN — SENNA PLUS 2 TABLET(S): 8.6 TABLET ORAL at 21:31

## 2022-03-07 RX ADMIN — PANTOPRAZOLE SODIUM 40 MILLIGRAM(S): 20 TABLET, DELAYED RELEASE ORAL at 06:03

## 2022-03-07 RX ADMIN — Medication 6 UNIT(S): at 11:30

## 2022-03-07 RX ADMIN — ATORVASTATIN CALCIUM 80 MILLIGRAM(S): 80 TABLET, FILM COATED ORAL at 21:30

## 2022-03-07 RX ADMIN — Medication 3 MILLIGRAM(S): at 21:48

## 2022-03-07 RX ADMIN — GABAPENTIN 200 MILLIGRAM(S): 400 CAPSULE ORAL at 17:13

## 2022-03-07 RX ADMIN — Medication 20 MILLIGRAM(S): at 06:02

## 2022-03-07 RX ADMIN — CARVEDILOL PHOSPHATE 6.25 MILLIGRAM(S): 80 CAPSULE, EXTENDED RELEASE ORAL at 06:00

## 2022-03-07 RX ADMIN — Medication 10 MILLIGRAM(S): at 14:13

## 2022-03-07 RX ADMIN — HEPARIN SODIUM 5000 UNIT(S): 5000 INJECTION INTRAVENOUS; SUBCUTANEOUS at 14:13

## 2022-03-07 RX ADMIN — TICAGRELOR 90 MILLIGRAM(S): 90 TABLET ORAL at 17:13

## 2022-03-07 RX ADMIN — HEPARIN SODIUM 5000 UNIT(S): 5000 INJECTION INTRAVENOUS; SUBCUTANEOUS at 06:02

## 2022-03-07 RX ADMIN — INSULIN GLARGINE 22 UNIT(S): 100 INJECTION, SOLUTION SUBCUTANEOUS at 21:32

## 2022-03-07 RX ADMIN — ISOSORBIDE DINITRATE 10 MILLIGRAM(S): 5 TABLET ORAL at 17:13

## 2022-03-07 RX ADMIN — Medication 6 UNIT(S): at 07:23

## 2022-03-07 RX ADMIN — TICAGRELOR 90 MILLIGRAM(S): 90 TABLET ORAL at 06:03

## 2022-03-07 RX ADMIN — Medication 2: at 11:30

## 2022-03-07 RX ADMIN — Medication 6 UNIT(S): at 21:38

## 2022-03-07 RX ADMIN — Medication 1 TABLET(S): at 11:30

## 2022-03-07 RX ADMIN — Medication 10 MILLIGRAM(S): at 21:31

## 2022-03-07 RX ADMIN — Medication 10 MILLIGRAM(S): at 06:02

## 2022-03-07 RX ADMIN — CARVEDILOL PHOSPHATE 6.25 MILLIGRAM(S): 80 CAPSULE, EXTENDED RELEASE ORAL at 17:13

## 2022-03-07 RX ADMIN — ISOSORBIDE DINITRATE 10 MILLIGRAM(S): 5 TABLET ORAL at 11:29

## 2022-03-07 NOTE — PROGRESS NOTE ADULT - SUBJECTIVE AND OBJECTIVE BOX
88y old  Male who presents with a chief complaint of NSTEMI    seen at the bedside, no new complaints, no n/v, no sob, no chest pain,     Vital Signs Last 24 Hrs  T(C): 36.6 (07 Mar 2022 08:25), Max: 36.7 (06 Mar 2022 12:15)  T(F): 97.9 (07 Mar 2022 08:25), Max: 98.1 (06 Mar 2022 12:15)  HR: 85 (07 Mar 2022 08:25) (74 - 85)  BP: 98/60 (07 Mar 2022 08:25) (98/60 - 145/72)  BP(mean): --  RR: 15 (07 Mar 2022 08:25) (15 - 18)  SpO2: 85% (07 Mar 2022 08:25) (85% - 99%)    GENERAL- NAD  EAR/NOSE/MOUTH/THROAT - no pharyngeal exudates, no oral leisions,  MMM  EYES- CAYLA, conjunctiva and Sclera clear  NECK- supple  RESPIRATORY-  clear to auscultation bilaterally, non laboured breathing  CARDIOVASCULAR - SIS2, RRR  GI - soft NT BS present  EXTREMITIES- no pedal edema  NEUROLOGY- no gross focal deficits  PSYCHIATRY- AAO X 3                  13.0                 138  | 23   | 38           9.13  >-----------< 238     ------------------------< 123                   39.5                 4.5  | 103  | 1.78                                         Ca 9.1   Mg x     Ph x        CAPILLARY BLOOD GLUCOSE      POCT Blood Glucose.: 193 mg/dL (07 Mar 2022 11:29)  POCT Blood Glucose.: 196 mg/dL (07 Mar 2022 07:22)  POCT Blood Glucose.: 179 mg/dL (06 Mar 2022 21:21)  POCT Blood Glucose.: 147 mg/dL (06 Mar 2022 17:33)  POCT Blood Glucose.: 237 mg/dL (06 Mar 2022 12:02)      A1C with Estimated Average Glucose Result: 8.8: High Risk (prediabetic)    5.7 - 6.4 %  Diabetic, diagnostic           > 6.5 %  ADA diabetic treatment goal    < 7.0 %  HbA1C values may not accurately reflect mean blood glucose in patients  with Hb variants.  Suggest clinical correlation. % (02.27.22 @ 02:24)      < from: TTE with Doppler (w/Cont) (02.27.22 @ 08:50) >  Mild segmental left  ventricular systolic dysfunction with hypokinesis of the  inferolateral wall. Visual estimate of EF about 45%.    < end of copied text >    MEDICATIONS  (STANDING):  aspirin enteric coated 81 milliGRAM(s) Oral daily  atorvastatin 80 milliGRAM(s) Oral at bedtime  carvedilol 6.25 milliGRAM(s) Oral every 12 hours  furosemide    Tablet 20 milliGRAM(s) Oral daily  gabapentin 200 milliGRAM(s) Oral two times a day  glucagon  Injectable 1 milliGRAM(s) IntraMuscular once  heparin   Injectable 5000 Unit(s) SubCutaneous every 8 hours  hydrALAZINE 10 milliGRAM(s) Oral three times a day  influenza  Vaccine (HIGH DOSE) 0.7 milliLiter(s) IntraMuscular once  insulin glargine Injectable (LANTUS) 22 Unit(s) SubCutaneous at bedtime  insulin lispro (ADMELOG) corrective regimen sliding scale   SubCutaneous three times a day before meals  insulin lispro (ADMELOG) corrective regimen sliding scale   SubCutaneous at bedtime  insulin lispro Injectable (ADMELOG) 6 Unit(s) SubCutaneous three times a day before meals  isosorbide   dinitrate Tablet (ISORDIL) 10 milliGRAM(s) Oral three times a day  multivitamin 1 Tablet(s) Oral daily  pantoprazole    Tablet 40 milliGRAM(s) Oral before breakfast  polyethylene glycol 3350 17 Gram(s) Oral daily  senna 2 Tablet(s) Oral at bedtime  ticagrelor 90 milliGRAM(s) Oral every 12 hours    MEDICATIONS  (PRN):  acetaminophen     Tablet .. 650 milliGRAM(s) Oral every 6 hours PRN Temp greater or equal to 38C (100.4F), Mild Pain (1 - 3), Moderate Pain (4 - 6)  guaiFENesin Oral Liquid (Sugar-Free) 100 milliGRAM(s) Oral every 6 hours PRN Cough  melatonin 3 milliGRAM(s) Oral at bedtime PRN Insomnia

## 2022-03-07 NOTE — PROGRESS NOTE ADULT - ATTENDING COMMENTS
Seen with Dr GEORGE Chavez    87 y/o  on acute rehab after STEMI with stenting x 2 vessels  Hx of CAD S/P CABG    Reports initial insomnia, reports hx of chronic insomnia, reluctant to take PRN melatonin due to consern for addition  Reassures that this is not addictive    Has no chest pain  Tolerating oral diet    Engaged in therapy today, working on strengthening LE    Clinically stable  AAO X 3  MMT 4/5 globally    Lab unremarkable    Dx: Debility post STEMI S/P Cardiac stenting  Commence PT/OT  Take melatonin if needed  Avoid benzos (preceeded metabolic encephalopathy)  Continue DAPT, and cardiology f/u, confirm duration  DVT ppx with heparin  Pain mgt to continue  Bowel/bladder care  Est dc as in care conference . Seen with Dr GEORGE Chavez    89 y/o  on acute rehab after STEMI with stenting x 2 vessels  Hx of CAD S/P CABG    Reports initial insomnia, reports hx of chronic insomnia, reluctant to take PRN melatonin due to consern for addition  Reassures that this is not addictive    Has no chest pain  Tolerating oral diet    Engaged in therapy today, working on strengthening LE    Clinically stable, initial low bp readings, stabilized   AAO X 3  MMT 4/5 globally    Lab unremarkable    Dx: Debility post STEMI S/P Cardiac stenting  Commence PT/OT  Monitor BP   Take melatonin if needed  Avoid benzos (preceeded metabolic encephalopathy)  Continue DAPT, and cardiology f/u, confirm duration  DVT ppx with heparin  Pain mgt to continue  Bowel/bladder care  Est dc as in care conference . Seen with Dr GEORGE Chavez    89 y/o  on acute rehab after STEMI with stenting x 2 vessels  Hx of CAD S/P CABG    Reports initial insomnia, reports hx of chronic insomnia, reluctant to take PRN melatonin due to concern for addition  Reassures that this is not addictive    Has no chest pain  Tolerating oral diet    Engaged in therapy today, working on strengthening LE    Clinically stable, initial low bp readings, stabilized   AAO X 3  MMT 4/5 globally    Lab unremarkable    Dx: Debility post STEMI S/P Cardiac stenting  Commence PT/OT  Monitor BP   Take melatonin if needed  Avoid benzos (preceeded metabolic encephalopathy)  Continue DAPT, and cardiology f/u, confirm duration  DVT ppx with heparin  Pain mgt to continue  Bowel/bladder care  Est dc as in care conference .

## 2022-03-07 NOTE — PROGRESS NOTE ADULT - SUBJECTIVE AND OBJECTIVE BOX
Subjective  Seen and exmined  Reports insomnia, reluctant to take melatonin due to concern for addition  No chest pain    Tolerating diet, engaged in therapy    ROS-No chest pain cough, dysuria or dyspnea     Labs unremarkable    Vital Signs Last 24 Hrs  T(C): 36.6 (07 Mar 2022 08:25), Max: 36.6 (06 Mar 2022 21:11)  T(F): 97.9 (07 Mar 2022 08:25), Max: 97.9 (07 Mar 2022 08:25)  HR: 85 (07 Mar 2022 08:25) (74 - 85)  BP: 98/60 (07 Mar 2022 08:25) (98/60 - 145/72)  RR: 15 (07 Mar 2022 08:25) (15 - 18)  SpO2: 85% (07 Mar 2022 08:25) (85% - 98%)    Exam  Gen - NAD, Comfortable  HEENT - NCAT, EOMI, dry tongue  Neck - Supple, No limited ROM  Pulm - CTAB  Cardiovascular - RRR, S1S2  Abdomen - Soft, NT/ND, +BS  Extremities - No C/C/E, No calf tenderness; b/l hands w/ J[place] and [place] nodes at DIP and PIP. Painful DIP and PIP especially at middle finger when fingers are passively flexed.    Neurologic Exam -                   Cognitive - Awake, Alert, AAO to self, place, date, year, situation     Communication - Fluent, No dysarthria     Cranial Nerves - CN 2-12 intact     Motor -                    LEFT    UE - ShAB 4/5, EF 4/5, EE 5/5, WE 5/5,  unable to  due to stiff MCP, DIP and PIP                    RIGHT UE - ShAB 4/5, EF 4/5, EE 5/5, WE 5/5,  unable to  due to stiff MCP, DIP and PIP                    LEFT    LE - HF 4/5, KE 5/5, DF 5/5, PF 5/5                    RIGHT LE - HF 4/5, KE 5/5, DF 5/5, PF 5/5       Sensory - Intact to LT except for b/l feet     Reflexes - DTR Intact, No primitive reflexes     Coordination - FTN intact     Balance - impaired  Psychiatric - Mood stable, Affect WNL  Skin: no sacral decubital wounds  RECENT LABS/IMAGING                        13.0   9.13  )-----------( 238      ( 07 Mar 2022 06:35 )             39.5     03-07    138  |  103  |  38<H>  ----------------------------<  123<H>  4.5   |  23  |  1.78<H>    Ca    9.1      07 Mar 2022 06:35    TPro  7.7  /  Alb  3.8  /  TBili  0.6  /  DBili  x   /  AST  48<H>  /  ALT  75<H>  /  AlkPhos  58  03-07    MEDICATIONS  (STANDING):  aspirin enteric coated 81 milliGRAM(s) Oral daily  atorvastatin 80 milliGRAM(s) Oral at bedtime  carvedilol 6.25 milliGRAM(s) Oral every 12 hours  dextrose 10% Bolus 250 milliLiter(s) IV Bolus once  dextrose 10% Bolus 125 milliLiter(s) IV Bolus once  dextrose 10% Bolus 125 milliLiter(s) IV Bolus once  dextrose 40% Gel 15 Gram(s) Oral once  dextrose 5%. 1000 milliLiter(s) (50 mL/Hr) IV Continuous <Continuous>  dextrose 5%. 1000 milliLiter(s) (100 mL/Hr) IV Continuous <Continuous>  furosemide    Tablet 20 milliGRAM(s) Oral daily  gabapentin 200 milliGRAM(s) Oral two times a day  glucagon  Injectable 1 milliGRAM(s) IntraMuscular once  heparin   Injectable 5000 Unit(s) SubCutaneous every 8 hours  hydrALAZINE 10 milliGRAM(s) Oral three times a day  influenza  Vaccine (HIGH DOSE) 0.7 milliLiter(s) IntraMuscular once  insulin glargine Injectable (LANTUS) 22 Unit(s) SubCutaneous at bedtime  insulin lispro (ADMELOG) corrective regimen sliding scale   SubCutaneous three times a day before meals  insulin lispro (ADMELOG) corrective regimen sliding scale   SubCutaneous at bedtime  insulin lispro Injectable (ADMELOG) 6 Unit(s) SubCutaneous three times a day before meals  isosorbide   dinitrate Tablet (ISORDIL) 10 milliGRAM(s) Oral three times a day  multivitamin 1 Tablet(s) Oral daily  pantoprazole    Tablet 40 milliGRAM(s) Oral before breakfast  polyethylene glycol 3350 17 Gram(s) Oral daily  senna 2 Tablet(s) Oral at bedtime  ticagrelor 90 milliGRAM(s) Oral every 12 hours    MEDICATIONS  (PRN):  acetaminophen     Tablet .. 650 milliGRAM(s) Oral every 6 hours PRN Temp greater or equal to 38C (100.4F), Mild Pain (1 - 3), Moderate Pain (4 - 6)  guaiFENesin Oral Liquid (Sugar-Free) 100 milliGRAM(s) Oral every 6 hours PRN Cough  melatonin 3 milliGRAM(s) Oral at bedtime PRN Insomnia                                     Subjective  Seen and exmined  Reports insomnia, reluctant to take melatonin due to concern for addition  No chest pain    Tolerating diet, engaged in therapy    ROS-No chest pain cough, dysuria or dyspnea     Labs unremarkable    Vital Signs Last 24 Hrs  T(C): 36.6 (07 Mar 2022 08:25), Max: 36.6 (06 Mar 2022 21:11)  T(F): 97.9 (07 Mar 2022 08:25), Max: 97.9 (07 Mar 2022 08:25)  HR: 85 (07 Mar 2022 08:25) (74 - 85)  BP: 98/60 (07 Mar 2022 08:25) (98/60 - 145/72)  RR: 15 (07 Mar 2022 08:25) (15 - 18)  SpO2: 85% (07 Mar 2022 08:25) (85% - 98%)    Exam  Gen - Comfortable  HEENT -NAD, no asymmetry   Neck - Supple, No limited ROM  Pulm - Clear  Cardiovascular - RRR, S1S2  Abdomen - Soft, NT/ND, +BS  Extremities - No C/C/E, No calf tenderness;  b/l hands at interphalangeal joints.    Neurologic Exam -                Cognitive - Awake, Alert, AAO to self, place, date, year, situation  Communication - Fluent, No dysarthria  Cranial Nerves - CN 2-12 intact  Motor -4/5 globally, and 5/5 and ankle  Sensory - Intact to LT except for b/l feet  Reflexes - DTR Intact, No primitive reflexes  Coordination - FTN intact  Balance - impaired dynamic balance  Psychiatric - Mood stable, Affect WNL  Skin: no sacral decubital wounds  RECENT LABS/IMAGING                        13.0   9.13  )-----------( 238      ( 07 Mar 2022 06:35 )             39.5     03-07    138  |  103  |  38<H>  ----------------------------<  123<H>  4.5   |  23  |  1.78<H>    Ca    9.1      07 Mar 2022 06:35    TPro  7.7  /  Alb  3.8  /  TBili  0.6  /  DBili  x   /  AST  48<H>  /  ALT  75<H>  /  AlkPhos  58  03-07    MEDICATIONS  (STANDING):  aspirin enteric coated 81 milliGRAM(s) Oral daily  atorvastatin 80 milliGRAM(s) Oral at bedtime  carvedilol 6.25 milliGRAM(s) Oral every 12 hours  dextrose 10% Bolus 250 milliLiter(s) IV Bolus once  dextrose 10% Bolus 125 milliLiter(s) IV Bolus once  dextrose 10% Bolus 125 milliLiter(s) IV Bolus once  dextrose 40% Gel 15 Gram(s) Oral once  dextrose 5%. 1000 milliLiter(s) (50 mL/Hr) IV Continuous <Continuous>  dextrose 5%. 1000 milliLiter(s) (100 mL/Hr) IV Continuous <Continuous>  furosemide    Tablet 20 milliGRAM(s) Oral daily  gabapentin 200 milliGRAM(s) Oral two times a day  glucagon  Injectable 1 milliGRAM(s) IntraMuscular once  heparin   Injectable 5000 Unit(s) SubCutaneous every 8 hours  hydrALAZINE 10 milliGRAM(s) Oral three times a day  influenza  Vaccine (HIGH DOSE) 0.7 milliLiter(s) IntraMuscular once  insulin glargine Injectable (LANTUS) 22 Unit(s) SubCutaneous at bedtime  insulin lispro (ADMELOG) corrective regimen sliding scale   SubCutaneous three times a day before meals  insulin lispro (ADMELOG) corrective regimen sliding scale   SubCutaneous at bedtime  insulin lispro Injectable (ADMELOG) 6 Unit(s) SubCutaneous three times a day before meals  isosorbide   dinitrate Tablet (ISORDIL) 10 milliGRAM(s) Oral three times a day  multivitamin 1 Tablet(s) Oral daily  pantoprazole    Tablet 40 milliGRAM(s) Oral before breakfast  polyethylene glycol 3350 17 Gram(s) Oral daily  senna 2 Tablet(s) Oral at bedtime  ticagrelor 90 milliGRAM(s) Oral every 12 hours    MEDICATIONS  (PRN):  acetaminophen     Tablet .. 650 milliGRAM(s) Oral every 6 hours PRN Temp greater or equal to 38C (100.4F), Mild Pain (1 - 3), Moderate Pain (4 - 6)  guaiFENesin Oral Liquid (Sugar-Free) 100 milliGRAM(s) Oral every 6 hours PRN Cough  melatonin 3 milliGRAM(s) Oral at bedtime PRN Insomnia

## 2022-03-07 NOTE — CHART NOTE - NSCHARTNOTEFT_GEN_A_CORE
[facility] Plan of Care    REHABILITATION DIAGNOSIS:  ST elevation myocardial infarction (STEMI) s/p stenting    COMORBIDITIES/COMPLICATING CONDITIONS IMPACTING REHABILITATION:  HEALTH ISSUES - PROBLEM Dx:    PAST MEDICAL & SURGICAL HISTORY:  Hyperlipemia    Hypertension    Coronary Artery Disease    Diabetes Mellitus Type II    Stented Coronary Artery  total 5 stents, last stent 5/2019    Neuropathy    Myocardial infarction    Stroke  mild left facial numbness   no other residuals verbalized    History of Cataract Extraction    Hx of CABG    H/O coronary angiogram    S/P coronary artery stent placement  1/6/09      Based upon consideration of the patient's impairments, functional status, complicating conditions and any other contributing factors and after information garnered from the assessments of all therapy disciplines involved in treating the patient and other pertinent clinicians:    INTERDISCIPLINARY REHABILITATION INTERVENTIONS:    [ X  ] Transfer Training  [ X  ] Bed Mobility  [ X  ] Therapeutic Exercise  [ X ] Balance/Coordination Exercises  [ X ] Locomotion retraining  [ X  ] Stairs  [  X ] Functional Transfer Training  [   ] Bowel/Bladder program  [   ] Pain Management  [   ] Skin/Wound Care  [   ] Visual/Perceptual Training  [   ] Therapeutic Recreation Activities  [   ] Neuromuscular Re-education  [ X  ] Activities of Daily Living  [   ] Speech Exercise  [   ] Swallowing Exercises  [   ] Vital Stim  [   ] Dietary Supplements  [   ] Calorie Count  [   ] Cognitive Exercises  [   ] Congnitive/Linguistic Treatment  [   ] Behavior Program  [   ] Neuropsych Therapy  [ X  ] Patient/Family Counseling  [ X ] Family Training  [ X  ] Community Re-entry  [   ] Orthotic Evaluation  [   ] Prosthetic Eval/Training    MEDICAL PROGNOSIS:  Good    REHAB POTENTIAL:  Good   EXPECTED DAILY THERAPY:         PT: 1.5       OT: 1hr        ST: 30 min evaluation and probably d/c        P&O:    EXPECTED INTENSITY OF PROGRAM:  3 hrs / Day    EXPECTED FREQUENCY OF PROGRAM: 5 Days/ Week    ESTIMATED LOS:  [  ] 5-7 Days  [  ] 7-10 Days  [ x ] 10- 14 Days  [  ] 14- 18 Days  [  ] 18- 21 Days    ESTIMATED DISPOSITION:  [  ] Home   [  ] Home with Outpatient Therapies  [ x ] Home with Home Therapies  [  ] Assisted Living  [  ] Nursing Home  [  ] Long Term Acute Care    INTERDISCIPLINARY FUNCTIONAL OUTCOMES/GOALS:         Gait/Mobility: ambulating with gait assistance device, independent       Transfers: independent        ADLs: independent       Functional Transfers: independent        Medication Management: with supervision       Communication: maintain good communication        Cognitive: maintain control of higher executive fxn       Dysphagia: continue to tolerate oral diet       Bladder maintain normal bladder  function       Bowel: maintain normal bowel function     Functional Independent Measures: 7  7 = Independent  6 = Modified Independent  5 = Supervision  4 = Minimal Assist/ Contact Guard  3 = Moderate Assistance  2 = Maximum Assistance  1 = Total Assistance  0 = Unable to assess

## 2022-03-07 NOTE — PROGRESS NOTE ADULT - ASSESSMENT
SHAIK DONOHUE is a 88y M with a PMH CAD s/p CABG (1999), PCI stents x 5 (2019), PVD with stent place to Right Posterior Tibial Artery? (2021), T2DM, HTN, HLD, CKD, presented to [facility] on 2/26/22, found to have STEMI, now s/p GEMMA on ASA81 and Brilinta. Course c/b poor glycemic control, encephalopathy, Acute CHF.   Patient now admitted for a multidisciplinary rehab program. 03-05-22 @ 14:08    * Monitor BP, low normal values earlier today     Rehab Management/MEDICAL MANAGEMENT    #Cardiopulmonary rehab s/p STEMI  - Gait Instability, ADL impairments and Functional impairments: start Comprehensive Rehab Program of PT/OT  - 3 hours a day, 5 days a week  - P&O as needed    #STEMI s/p GEMMA  - s/p GEMMA to mSVG->OM and POBA proxSVG->OM  - Continue ASA, Brilinta, atorvastatin, Hydralazine, Isordil, Lasix    #DM2  - Lantus 18 qhs + lispro 6 unit premeals  - Endo rec for discharge when done w/ rehab:  - Recommend for discharge Basaglar 20 units SQ qHS, Jardiance 25 mg daily and Tradjenta 5 mg daily    #Sleep  - melatonin PRN    #Skin  - Skin on admission:  - Pressure injury/Skin: OOB to Chair, PT/OT    #Pain Mgmt  #DM Neuropathy  - Tylenol PRN  - gabapentin for DM neuropathy    #GI/Bowel Mgmt  - Continent c/w Senna, Miralax    #/Bladder Mgmt  -  PVR 8h,  CIC >400cc    #FEN  - Diet - Regular + Thins  [CCHO, Kosher/Halal, Pescovegeterian]    #Precautions / PROPHYLAXIS:  - Falls, Cardiac  - ortho: Weight bearing status: WBAT  - Lungs: Aspiration, Incentive Spirometer  - DVT: Brilinta, hep subq    F/U Cardiology, PCP   SHAIK DONOHUE is a 88y M with a PMH CAD s/p CABG (1999), PCI stents x 5 (2019), PVD with stent place to Right Posterior Tibial Artery? (2021), T2DM, HTN, HLD, CKD, presented to [facility] on 2/26/22, found to have STEMI, now s/p GEMMA on ASA81 and Brilinta. Course c/b poor glycemic control, encephalopathy, Acute CHF.   Patient now admitted for a multidisciplinary rehab program. 03-05-22 @ 14:08    * Monitor BP, low normal values earlier today  * arthritis of fingers    Rehab Management/MEDICAL MANAGEMENT    #Cardiopulmonary rehab s/p STEMI  - Gait Instability, ADL impairments and Functional impairments: start Comprehensive Rehab Program of PT/OT  - 3 hours a day, 5 days a week  - P&O as needed    #STEMI s/p GEMMA  - s/p GEMMA to mSVG->OM and POBA proxSVG->OM  - Continue ASA, Brilinta, atorvastatin, Hydralazine, Isordil, Lasix    #DM2  - Lantus 18 qhs + lispro 6 unit premeals  - Endo rec for discharge when done w/ rehab:  - Recommend for discharge Basaglar 20 units SQ qHS, Jardiance 25 mg daily and Tradjenta 5 mg daily    #Sleep  - melatonin PRN    #Skin  - Skin on admission:  - Pressure injury/Skin: OOB to Chair, PT/OT    #Pain Mgmt  #DM Neuropathy  - Tylenol PRN  - gabapentin for DM neuropathy    #GI/Bowel Mgmt  - Continent c/w Senna, Miralax    #/Bladder Mgmt  -  PVR 8h,  CIC >400cc    #FEN  - Diet - Regular + Thins  [CCHO, Kosher/Halal, Pescovegeterian]    #Precautions / PROPHYLAXIS:  - Falls, Cardiac  - ortho: Weight bearing status: WBAT  - Lungs: Aspiration, Incentive Spirometer  - DVT: Brilinta, hep subq    F/U Cardiology, PCP

## 2022-03-07 NOTE — PROGRESS NOTE ADULT - ASSESSMENT
88 M CAD/MI s/p CABG/stents, PAD s/p RLE stent, HTN, DM2, CKD stage 3, CVA - no residual symptoms, s/p NSTEMI s/p GEMMA,  c/b decompensated HF and IABP placed for mechanical support (removed 2/28), MABLE on CKD, hyperglycemia, acute encephalopathy after dose of xanax now admitted for rehab- pt/ot/dvt ppx

## 2022-03-08 LAB
GLUCOSE BLDC GLUCOMTR-MCNC: 147 MG/DL — HIGH (ref 70–99)
GLUCOSE BLDC GLUCOMTR-MCNC: 157 MG/DL — HIGH (ref 70–99)
GLUCOSE BLDC GLUCOMTR-MCNC: 236 MG/DL — HIGH (ref 70–99)
GLUCOSE BLDC GLUCOMTR-MCNC: 286 MG/DL — HIGH (ref 70–99)

## 2022-03-08 PROCEDURE — 99232 SBSQ HOSP IP/OBS MODERATE 35: CPT

## 2022-03-08 PROCEDURE — 99233 SBSQ HOSP IP/OBS HIGH 50: CPT

## 2022-03-08 RX ORDER — INSULIN GLARGINE 100 [IU]/ML
25 INJECTION, SOLUTION SUBCUTANEOUS AT BEDTIME
Refills: 0 | Status: DISCONTINUED | OUTPATIENT
Start: 2022-03-08 | End: 2022-03-10

## 2022-03-08 RX ADMIN — ISOSORBIDE DINITRATE 10 MILLIGRAM(S): 5 TABLET ORAL at 06:27

## 2022-03-08 RX ADMIN — Medication 10 MILLIGRAM(S): at 21:18

## 2022-03-08 RX ADMIN — Medication 2: at 12:16

## 2022-03-08 RX ADMIN — GABAPENTIN 200 MILLIGRAM(S): 400 CAPSULE ORAL at 17:18

## 2022-03-08 RX ADMIN — ISOSORBIDE DINITRATE 10 MILLIGRAM(S): 5 TABLET ORAL at 17:17

## 2022-03-08 RX ADMIN — TICAGRELOR 90 MILLIGRAM(S): 90 TABLET ORAL at 17:18

## 2022-03-08 RX ADMIN — Medication 6 UNIT(S): at 17:12

## 2022-03-08 RX ADMIN — INSULIN GLARGINE 25 UNIT(S): 100 INJECTION, SOLUTION SUBCUTANEOUS at 21:19

## 2022-03-08 RX ADMIN — HEPARIN SODIUM 5000 UNIT(S): 5000 INJECTION INTRAVENOUS; SUBCUTANEOUS at 21:18

## 2022-03-08 RX ADMIN — Medication 81 MILLIGRAM(S): at 12:16

## 2022-03-08 RX ADMIN — HEPARIN SODIUM 5000 UNIT(S): 5000 INJECTION INTRAVENOUS; SUBCUTANEOUS at 14:28

## 2022-03-08 RX ADMIN — HEPARIN SODIUM 5000 UNIT(S): 5000 INJECTION INTRAVENOUS; SUBCUTANEOUS at 06:26

## 2022-03-08 RX ADMIN — CARVEDILOL PHOSPHATE 6.25 MILLIGRAM(S): 80 CAPSULE, EXTENDED RELEASE ORAL at 06:26

## 2022-03-08 RX ADMIN — Medication 10 MILLIGRAM(S): at 14:28

## 2022-03-08 RX ADMIN — PANTOPRAZOLE SODIUM 40 MILLIGRAM(S): 20 TABLET, DELAYED RELEASE ORAL at 06:27

## 2022-03-08 RX ADMIN — Medication 6 UNIT(S): at 12:16

## 2022-03-08 RX ADMIN — Medication 20 MILLIGRAM(S): at 06:26

## 2022-03-08 RX ADMIN — ISOSORBIDE DINITRATE 10 MILLIGRAM(S): 5 TABLET ORAL at 12:16

## 2022-03-08 RX ADMIN — GABAPENTIN 200 MILLIGRAM(S): 400 CAPSULE ORAL at 06:26

## 2022-03-08 RX ADMIN — ATORVASTATIN CALCIUM 80 MILLIGRAM(S): 80 TABLET, FILM COATED ORAL at 21:18

## 2022-03-08 RX ADMIN — Medication 3 MILLIGRAM(S): at 21:23

## 2022-03-08 RX ADMIN — Medication 6 UNIT(S): at 07:57

## 2022-03-08 RX ADMIN — Medication 4: at 07:57

## 2022-03-08 RX ADMIN — Medication 10 MILLIGRAM(S): at 06:26

## 2022-03-08 RX ADMIN — Medication 1 TABLET(S): at 12:16

## 2022-03-08 RX ADMIN — TICAGRELOR 90 MILLIGRAM(S): 90 TABLET ORAL at 06:27

## 2022-03-08 RX ADMIN — CARVEDILOL PHOSPHATE 6.25 MILLIGRAM(S): 80 CAPSULE, EXTENDED RELEASE ORAL at 17:18

## 2022-03-08 NOTE — PROGRESS NOTE ADULT - SUBJECTIVE AND OBJECTIVE BOX
Subjective  Seen and examined at bedside  reports feeling depressed - offered psychology and psychiatry consult, but decline.  Discussed Recreation therapy, patient is open to try  Still with intermittent dry cough    Tolerating diet, engaged in therapy    ROS  +depressed  No chest pain, SOB, HA, nausea, abd pain, dysuria .  LBM yesterday 3/7    Vital Signs Last 24 Hrs  T(C): 36.2 (08 Mar 2022 07:49), Max: 36.4 (07 Mar 2022 21:00)  T(F): 97.2 (08 Mar 2022 07:49), Max: 97.6 (07 Mar 2022 21:00)  HR: 83 (08 Mar 2022 07:49) (70 - 83)  BP: 116/62 (08 Mar 2022 07:49) (114/83 - 122/70)  RR: 14 (08 Mar 2022 07:49) (14 - 18)  SpO2: 100% (08 Mar 2022 07:49) (98% - 100%)    Exam  Gen - Comfortable  HEENT -NAD, no asymmetry   Neck - Supple, No limited ROM  Pulm - Clear  Cardiovascular - RRR, S1S2  Abdomen - Soft, NT/ND, +BS  Extremities - No C/C/E, No calf tenderness;  b/l hands interphalangeal joints tenderness and stiffness - unable to fully close fist    Neurologic Exam -                Cognitive - Awake, Alert, AAO x3   Motor -4/5 globally, and 5/5 and ankle  Sensory - Intact to LT except for b/l feet  Coordination - FTN intact  Psychiatric - flat       RECENT LABS/IMAGING                        13.0   9.13  )-----------( 238      ( 07 Mar 2022 06:35 )             39.5     03-07    138  |  103  |  38<H>  ----------------------------<  123<H>  4.5   |  23  |  1.78<H>    Ca    9.1      07 Mar 2022 06:35    TPro  7.7  /  Alb  3.8  /  TBili  0.6  /  DBili  x   /  AST  48<H>  /  ALT  75<H>  /  AlkPhos  58  03-07    CAPILLARY BLOOD GLUCOSE  POCT Blood Glucose.: 157 mg/dL (08 Mar 2022 12:13)  POCT Blood Glucose.: 236 mg/dL (08 Mar 2022 07:57)  POCT Blood Glucose.: 275 mg/dL (07 Mar 2022 21:22)  POCT Blood Glucose.: 200 mg/dL (07 Mar 2022 19:11)    MEDICATIONS  (STANDING):  aspirin enteric coated 81 milliGRAM(s) Oral daily  atorvastatin 80 milliGRAM(s) Oral at bedtime  carvedilol 6.25 milliGRAM(s) Oral every 12 hours  dextrose 10% Bolus 250 milliLiter(s) IV Bolus once  dextrose 10% Bolus 125 milliLiter(s) IV Bolus once  dextrose 10% Bolus 125 milliLiter(s) IV Bolus once  dextrose 40% Gel 15 Gram(s) Oral once  dextrose 5%. 1000 milliLiter(s) (50 mL/Hr) IV Continuous <Continuous>  dextrose 5%. 1000 milliLiter(s) (100 mL/Hr) IV Continuous <Continuous>  furosemide    Tablet 20 milliGRAM(s) Oral daily  gabapentin 200 milliGRAM(s) Oral two times a day  glucagon  Injectable 1 milliGRAM(s) IntraMuscular once  heparin   Injectable 5000 Unit(s) SubCutaneous every 8 hours  hydrALAZINE 10 milliGRAM(s) Oral three times a day  influenza  Vaccine (HIGH DOSE) 0.7 milliLiter(s) IntraMuscular once  insulin glargine Injectable (LANTUS) 25 Unit(s) SubCutaneous at bedtime  insulin lispro (ADMELOG) corrective regimen sliding scale   SubCutaneous three times a day before meals  insulin lispro (ADMELOG) corrective regimen sliding scale   SubCutaneous at bedtime  insulin lispro Injectable (ADMELOG) 6 Unit(s) SubCutaneous three times a day before meals  isosorbide   dinitrate Tablet (ISORDIL) 10 milliGRAM(s) Oral three times a day  multivitamin 1 Tablet(s) Oral daily  pantoprazole    Tablet 40 milliGRAM(s) Oral before breakfast  polyethylene glycol 3350 17 Gram(s) Oral daily  senna 2 Tablet(s) Oral at bedtime  ticagrelor 90 milliGRAM(s) Oral every 12 hours    MEDICATIONS  (PRN):  acetaminophen     Tablet .. 650 milliGRAM(s) Oral every 6 hours PRN Temp greater or equal to 38C (100.4F), Mild Pain (1 - 3), Moderate Pain (4 - 6)  guaiFENesin Oral Liquid (Sugar-Free) 100 milliGRAM(s) Oral every 6 hours PRN Cough  melatonin 3 milliGRAM(s) Oral at bedtime PRN Insomnia

## 2022-03-08 NOTE — PROGRESS NOTE ADULT - ASSESSMENT
SHAIK DONOHUE is a 88y M with a PMH CAD s/p CABG (1999), PCI stents x 5 (2019), PVD with stent place to Right Posterior Tibial Artery? (2021), T2DM, HTN, HLD, CKD, presented to [facility] on 2/26/22, found to have STEMI, now s/p GEMMA on ASA81 and Brilinta. Course c/b poor glycemic control, encephalopathy, Acute CHF.   Patient now admitted for a multidisciplinary rehab program. 03-05-22 @ 14:08    Rehab Management/MEDICAL MANAGEMENT    #Cardiopulmonary rehab s/p STEMI  - Gait Instability, ADL impairments and Functional impairments: start Comprehensive Rehab Program of PT/OT  - 3 hours a day, 5 days a week  - P&O as needed    #STEMI s/p GEMMA  - s/p GEMMA to mSVG->OM and POBA proxSVG->OM  - Continue ASA, Brilinta, atorvastatin, Hydralazine, Isordil, Lasix    #DM2  - Lantus 18 qhs + lispro 6 unit premeals  - Endo rec for discharge when done w/ rehab:  - Recommend for discharge Basaglar 20 units SQ qHS, Jardiance 25 mg daily and Tradjenta 5 mg daily    #Depressed  - Declined Psychology/psychiatry evaluation  - Agreeable to recreation therapy    #Sleep  - melatonin PRN    #Skin  - Skin on admission:  - Pressure injury/Skin: OOB to Chair, PT/OT    #Pain Mgmt  #DM Neuropathy  - Tylenol PRN  - gabapentin for DM neuropathy    #GI/Bowel Mgmt  - Continent c/w Senna, Miralax    #/Bladder Mgmt  -  PVR 8h,  CIC >400cc - low and dc    #FEN  - Diet - Regular + Thins  [CCHO, Kosher/Halal, Pescovegeterian]    #Precautions / PROPHYLAXIS:  - Falls, Cardiac  - ortho: Weight bearing status: WBAT  - Lungs: Aspiration, Incentive Spirometer  - DVT: Brilinta, hep subq    F/U Cardiology, PCP

## 2022-03-08 NOTE — PROGRESS NOTE ADULT - SUBJECTIVE AND OBJECTIVE BOX
88y old  Male who presents with a chief complaint of NSTEMI    seen at the bedside, no new complaints, no n/v, no sob, no chest pain    Vital Signs Last 24 Hrs  T(C): 36.2 (08 Mar 2022 07:49), Max: 36.4 (07 Mar 2022 21:00)  T(F): 97.2 (08 Mar 2022 07:49), Max: 97.6 (07 Mar 2022 21:00)  HR: 83 (08 Mar 2022 07:49) (70 - 83)  BP: 116/62 (08 Mar 2022 07:49) (114/83 - 122/70)  BP(mean): --  RR: 14 (08 Mar 2022 07:49) (14 - 18)  SpO2: 100% (08 Mar 2022 07:49) (98% - 100%)    GENERAL- NAD  EAR/NOSE/MOUTH/THROAT - no pharyngeal exudates, no oral leisions,  MMM  EYES- CAYLA, conjunctiva and Sclera clear  NECK- supple  RESPIRATORY-  clear to auscultation bilaterally, non laboured breathing  CARDIOVASCULAR - SIS2, RRR  GI - soft NT BS present  EXTREMITIES- no pedal edema  NEUROLOGY- no gross focal deficits  PSYCHIATRY- AAO X 3                13.0                 138  | 23   | 38           9.13  >-----------< 238     ------------------------< 123                   39.5                 4.5  | 103  | 1.78                                         Ca 9.1   Mg x     Ph x        CAPILLARY BLOOD GLUCOSE      POCT Blood Glucose.: 157 mg/dL (08 Mar 2022 12:13)  POCT Blood Glucose.: 236 mg/dL (08 Mar 2022 07:57)  POCT Blood Glucose.: 275 mg/dL (07 Mar 2022 21:22)  POCT Blood Glucose.: 200 mg/dL (07 Mar 2022 19:11)      MEDICATIONS  (STANDING):  aspirin enteric coated 81 milliGRAM(s) Oral daily  atorvastatin 80 milliGRAM(s) Oral at bedtime  carvedilol 6.25 milliGRAM(s) Oral every 12 hours  furosemide    Tablet 20 milliGRAM(s) Oral daily  gabapentin 200 milliGRAM(s) Oral two times a day  glucagon  Injectable 1 milliGRAM(s) IntraMuscular once  heparin   Injectable 5000 Unit(s) SubCutaneous every 8 hours  hydrALAZINE 10 milliGRAM(s) Oral three times a day  influenza  Vaccine (HIGH DOSE) 0.7 milliLiter(s) IntraMuscular once  insulin glargine Injectable (LANTUS) 22 Unit(s) SubCutaneous at bedtime  insulin lispro (ADMELOG) corrective regimen sliding scale   SubCutaneous three times a day before meals  insulin lispro (ADMELOG) corrective regimen sliding scale   SubCutaneous at bedtime  insulin lispro Injectable (ADMELOG) 6 Unit(s) SubCutaneous three times a day before meals  isosorbide   dinitrate Tablet (ISORDIL) 10 milliGRAM(s) Oral three times a day  multivitamin 1 Tablet(s) Oral daily  pantoprazole    Tablet 40 milliGRAM(s) Oral before breakfast  polyethylene glycol 3350 17 Gram(s) Oral daily  senna 2 Tablet(s) Oral at bedtime  ticagrelor 90 milliGRAM(s) Oral every 12 hours    MEDICATIONS  (PRN):  acetaminophen     Tablet .. 650 milliGRAM(s) Oral every 6 hours PRN Temp greater or equal to 38C (100.4F), Mild Pain (1 - 3), Moderate Pain (4 - 6)  guaiFENesin Oral Liquid (Sugar-Free) 100 milliGRAM(s) Oral every 6 hours PRN Cough  melatonin 3 milliGRAM(s) Oral at bedtime PRN Insomnia

## 2022-03-08 NOTE — PROGRESS NOTE ADULT - ATTENDING COMMENTS
Neuropsychology input requested for developing mood disorder.  Spoke with son at bedside, update given

## 2022-03-08 NOTE — PROGRESS NOTE ADULT - PROBLEM SELECTOR PLAN 2
A1C 8.8   - increase Lantus to 25 hs, c/w pre meal, ISS, Accu-Chek A1C 8.8   - increase Lantus to 25 hs, c/w pre meal, ISS, Accu-Chek  home med- Tradjenta, basalgar 40 u

## 2022-03-09 ENCOUNTER — TRANSCRIPTION ENCOUNTER (OUTPATIENT)
Age: 87
End: 2022-03-09

## 2022-03-09 LAB
GLUCOSE BLDC GLUCOMTR-MCNC: 122 MG/DL — HIGH (ref 70–99)
GLUCOSE BLDC GLUCOMTR-MCNC: 194 MG/DL — HIGH (ref 70–99)
GLUCOSE BLDC GLUCOMTR-MCNC: 273 MG/DL — HIGH (ref 70–99)
GLUCOSE BLDC GLUCOMTR-MCNC: 89 MG/DL — SIGNIFICANT CHANGE UP (ref 70–99)

## 2022-03-09 PROCEDURE — 99233 SBSQ HOSP IP/OBS HIGH 50: CPT

## 2022-03-09 PROCEDURE — 99232 SBSQ HOSP IP/OBS MODERATE 35: CPT

## 2022-03-09 RX ORDER — ALPRAZOLAM 0.25 MG
0.25 TABLET ORAL AT BEDTIME
Refills: 0 | Status: DISCONTINUED | OUTPATIENT
Start: 2022-03-09 | End: 2022-03-09

## 2022-03-09 RX ADMIN — ISOSORBIDE DINITRATE 10 MILLIGRAM(S): 5 TABLET ORAL at 05:52

## 2022-03-09 RX ADMIN — Medication 10 MILLIGRAM(S): at 21:15

## 2022-03-09 RX ADMIN — Medication 10 MILLIGRAM(S): at 14:44

## 2022-03-09 RX ADMIN — Medication 0.25 MILLIGRAM(S): at 21:14

## 2022-03-09 RX ADMIN — Medication 2: at 21:14

## 2022-03-09 RX ADMIN — Medication 20 MILLIGRAM(S): at 05:51

## 2022-03-09 RX ADMIN — Medication 6 UNIT(S): at 11:58

## 2022-03-09 RX ADMIN — Medication 3 MILLIGRAM(S): at 21:15

## 2022-03-09 RX ADMIN — Medication 10 MILLIGRAM(S): at 05:52

## 2022-03-09 RX ADMIN — ISOSORBIDE DINITRATE 10 MILLIGRAM(S): 5 TABLET ORAL at 17:44

## 2022-03-09 RX ADMIN — Medication 81 MILLIGRAM(S): at 12:00

## 2022-03-09 RX ADMIN — CARVEDILOL PHOSPHATE 6.25 MILLIGRAM(S): 80 CAPSULE, EXTENDED RELEASE ORAL at 05:51

## 2022-03-09 RX ADMIN — HEPARIN SODIUM 5000 UNIT(S): 5000 INJECTION INTRAVENOUS; SUBCUTANEOUS at 05:52

## 2022-03-09 RX ADMIN — ISOSORBIDE DINITRATE 10 MILLIGRAM(S): 5 TABLET ORAL at 11:59

## 2022-03-09 RX ADMIN — CARVEDILOL PHOSPHATE 6.25 MILLIGRAM(S): 80 CAPSULE, EXTENDED RELEASE ORAL at 17:45

## 2022-03-09 RX ADMIN — Medication 2: at 07:51

## 2022-03-09 RX ADMIN — POLYETHYLENE GLYCOL 3350 17 GRAM(S): 17 POWDER, FOR SOLUTION ORAL at 11:59

## 2022-03-09 RX ADMIN — Medication 6 UNIT(S): at 07:51

## 2022-03-09 RX ADMIN — TICAGRELOR 90 MILLIGRAM(S): 90 TABLET ORAL at 17:45

## 2022-03-09 RX ADMIN — TICAGRELOR 90 MILLIGRAM(S): 90 TABLET ORAL at 05:52

## 2022-03-09 RX ADMIN — HEPARIN SODIUM 5000 UNIT(S): 5000 INJECTION INTRAVENOUS; SUBCUTANEOUS at 21:15

## 2022-03-09 RX ADMIN — GABAPENTIN 200 MILLIGRAM(S): 400 CAPSULE ORAL at 05:52

## 2022-03-09 RX ADMIN — SENNA PLUS 2 TABLET(S): 8.6 TABLET ORAL at 21:15

## 2022-03-09 RX ADMIN — PANTOPRAZOLE SODIUM 40 MILLIGRAM(S): 20 TABLET, DELAYED RELEASE ORAL at 05:53

## 2022-03-09 RX ADMIN — Medication 1 TABLET(S): at 12:00

## 2022-03-09 RX ADMIN — INSULIN GLARGINE 25 UNIT(S): 100 INJECTION, SOLUTION SUBCUTANEOUS at 21:14

## 2022-03-09 RX ADMIN — ATORVASTATIN CALCIUM 80 MILLIGRAM(S): 80 TABLET, FILM COATED ORAL at 21:15

## 2022-03-09 RX ADMIN — HEPARIN SODIUM 5000 UNIT(S): 5000 INJECTION INTRAVENOUS; SUBCUTANEOUS at 14:45

## 2022-03-09 RX ADMIN — GABAPENTIN 200 MILLIGRAM(S): 400 CAPSULE ORAL at 17:46

## 2022-03-09 NOTE — DISCHARGE NOTE PROVIDER - DETAILS OF MALNUTRITION DIAGNOSIS/DIAGNOSES
This patient has been assessed with a concern for Malnutrition and was treated during this hospitalization for the following Nutrition diagnosis/diagnoses:     -  03/06/2022: Moderate protein-calorie malnutrition

## 2022-03-09 NOTE — CHART NOTE - NSCHARTNOTEFT_GEN_A_CORE
Nutrition Follow Up Note  Hospital Course   (Per Electronic Medical Record)    Source:  Patient [X]  Medical Record [X]      Diet:   Regular Diet (IDDSI Level 7) w/ Thin Liquids (IDDSI Level 0)  Tolerates Diet Consistency Well  No Chewing/Swallowing Difficulties  No Recent Nausea, Vomiting, Diarrhea or Constipation (as Per Patient)   Consumes % of Meals (as Per Documentation) - States Good PO Intake/Appetite   Declines Nutrition Supplementation   Education Provided on Proper Nutrition     Enteral/Parenteral Nutrition: Not Applicable    Current Weight: 174.6lb on 3/5  Obtain New Weight  Obtain Weights Weekly     Pertinent Medications: MEDICATIONS  (STANDING):  aspirin enteric coated 81 milliGRAM(s) Oral daily  atorvastatin 80 milliGRAM(s) Oral at bedtime  carvedilol 6.25 milliGRAM(s) Oral every 12 hours  dextrose 10% Bolus 250 milliLiter(s) IV Bolus once  dextrose 10% Bolus 125 milliLiter(s) IV Bolus once  dextrose 10% Bolus 125 milliLiter(s) IV Bolus once  dextrose 40% Gel 15 Gram(s) Oral once  dextrose 5%. 1000 milliLiter(s) (50 mL/Hr) IV Continuous <Continuous>  dextrose 5%. 1000 milliLiter(s) (100 mL/Hr) IV Continuous <Continuous>  furosemide    Tablet 20 milliGRAM(s) Oral daily  gabapentin 200 milliGRAM(s) Oral two times a day  glucagon  Injectable 1 milliGRAM(s) IntraMuscular once  heparin   Injectable 5000 Unit(s) SubCutaneous every 8 hours  hydrALAZINE 10 milliGRAM(s) Oral three times a day  influenza  Vaccine (HIGH DOSE) 0.7 milliLiter(s) IntraMuscular once  insulin glargine Injectable (LANTUS) 25 Unit(s) SubCutaneous at bedtime  insulin lispro (ADMELOG) corrective regimen sliding scale   SubCutaneous three times a day before meals  insulin lispro (ADMELOG) corrective regimen sliding scale   SubCutaneous at bedtime  insulin lispro Injectable (ADMELOG) 6 Unit(s) SubCutaneous three times a day before meals  isosorbide   dinitrate Tablet (ISORDIL) 10 milliGRAM(s) Oral three times a day  multivitamin 1 Tablet(s) Oral daily  pantoprazole    Tablet 40 milliGRAM(s) Oral before breakfast  polyethylene glycol 3350 17 Gram(s) Oral daily  senna 2 Tablet(s) Oral at bedtime  ticagrelor 90 milliGRAM(s) Oral every 12 hours    MEDICATIONS  (PRN):  acetaminophen     Tablet .. 650 milliGRAM(s) Oral every 6 hours PRN Temp greater or equal to 38C (100.4F), Mild Pain (1 - 3), Moderate Pain (4 - 6)  guaiFENesin Oral Liquid (Sugar-Free) 100 milliGRAM(s) Oral every 6 hours PRN Cough  melatonin 3 milliGRAM(s) Oral at bedtime PRN Insomnia    Pertinent Labs:  03-07 Na138 mmol/L Glu 123 mg/dL<H> K+ 4.5 mmol/L Cr  1.78 mg/dL<H> BUN 38 mg/dL<H> 03-05 Phos 3.9 mg/dL 03-07 Alb 3.8 g/dL 02-27 Chol 113 mg/dL LDL --    HDL 51 mg/dL Trig 46 mg/dL    POCT (over Last 3 Days) - Ranging from 147-288    Skin: No Pressure Ulcers     Edema: None Noted (as Per Documentation)     Last Bowel Movement: on 3/8    Estimated Needs:   [X] No Change Since Previous Assessment    Previous Nutrition Diagnosis:   Moderate Malnutrition     Nutrition Diagnosis is [X] Ongoing - Declines Nutrition Supplementation     New Nutrition Diagnosis: [X] Not Applicable    Interventions:   1. Education Provided on Proper Nutrition   2. Recommend Continue Nutrition Plan of Care     Monitoring & Evaluation:   [X] Weights   [X] PO Intake   [X] Skin Integrity   [X] Follow Up (Per Protocol)  [X] Tolerance to Diet Prescription   [X] Other: Labs & POCT    Registered Dietitian/Nutritionist Remains Available.  Po Pearson RDN    Pager #[phone]  Phone# [phone]

## 2022-03-09 NOTE — PROGRESS NOTE ADULT - PROBLEM SELECTOR PLAN 4
now euvolemic, no fluid overload, asymptomatic, stable  Continue ASA, Brilinta, Coreg, isordil, hydralazine, Lasix.
now euvolemic, no fluid overload, asymptomatic, stable  Continue ASA, Brilinta, Coreg, isordil, hydralazine, Lasix.
Continue ASA, Brilinta, Coreg, isordil, hydralazine, Lasix.

## 2022-03-09 NOTE — PROGRESS NOTE ADULT - PROBLEM SELECTOR PLAN 6
Now resolved, likely due to dose of xanax +/- delirium as patient did not sleep   Will avoid benzos.      dvt ppx- heparin    will follow d/w dr. waggoner

## 2022-03-09 NOTE — DISCHARGE NOTE PROVIDER - NSDCHHCONTACT_GEN_ALL_CORE_FT
Continue Regimen: Epiduo forte QHS \\nAczone 7.5% QAM\\nMinocycline 100mg BID Otc Regimen: SPF 30 or above QAM \\nGentle unscented cleanser BID\\nGentle unscented moisturizer QHS Plan: Patient has severe cystic acne with scarring that is not responding as well as expected to topicals and oral antibiotics.  Discussed isotretinoin in detail with patient and mother.  Reviewed risks including tetratogenicity, hepatotoxicity, hyperlipidemia, xerosis, photosensitivity, depression, and IBD.  Pt and mother voiced understanding and consent forms obtained.  May continue current regimen until her next visit.  Plan labs at next visit.\\n\\nMother states that patient is dealing with elevated BP and is working with peds/cardiologist to control. As certified below, I, or a nurse practitioner or physician assistant working with me, had a face-to-face encounter that meets the physician face-to-face encounter requirements. Detail Level: Zone

## 2022-03-09 NOTE — PROGRESS NOTE ADULT - ASSESSMENT
SHAIK DONOHUE is a 88y M with a PMH CAD s/p CABG (1999), PCI stents x 5 (2019), PVD with stent place to Right Posterior Tibial Artery? (2021), T2DM, HTN, HLD, CKD, presented to [facility] on 2/26/22, found to have STEMI, now s/p GEMMA on ASA81 and Brilinta. Course c/b poor glycemic control, encephalopathy, Acute CHF.   Patient now admitted for a multidisciplinary rehab program. 03-05-22 @ 14:08    * Significant functional improvement * Previously reported low mood not apparent today    Rehab Management/MEDICAL MANAGEMENT    #Cardiopulmonary rehab s/p STEMI  - Gait Instability, ADL impairments and Functional impairments: start Comprehensive Rehab Program of PT/OT  - 3 hours a day, 5 days a week  - P&O as needed    #STEMI s/p GEMMA  - s/p GEMMA to mSVG->OM and POBA proxSVG->OM  - Continue ASA, Brilinta, atorvastatin, Hydralazine, Isordil, Lasix    #DM2  - Lantus 18 qhs + lispro 6 unit premeals  - Endo rec for discharge when done w/ rehab:  - Recommend for discharge Basaglar 20 units SQ qHS, Jardiance 25 mg daily and Tradjenta 5 mg daily    #Depressed  - Declined Psychology/psychiatry evaluation  - Agreeable to recreation therapy    #Sleep  - melatonin PRN    #Skin  - Skin on admission:  - Pressure injury/Skin: OOB to Chair, PT/OT    #Pain Mgmt  #DM Neuropathy  - Tylenol PRN  - gabapentin for DM neuropathy    #GI/Bowel Mgmt  - Continent c/w Senna, Miralax    #/Bladder Mgmt  -  PVR 8h,  CIC >400cc - low and dc    #FEN  - Diet - Regular + Thins  [CCHO, Kosher/Halal, Pescovegeterian]    #Precautions / PROPHYLAXIS:  - Falls, Cardiac  - ortho: Weight bearing status: WBAT  - Lungs: Aspiration, Incentive Spirometer  - DVT: Brilinta, hep subq    IDT   3/9/22  Ambulating >50FT with supervision  Tolerating oral diet  Upper body dressing with supervision, lower body dressing with min assistance  Est dc 2/12 or earlier    F/U Cardiology, PCP

## 2022-03-09 NOTE — PROGRESS NOTE ADULT - PROBLEM SELECTOR PLAN 3
Lesion seen on renal U/S,  follow up OP for MRI and w/ Urology

## 2022-03-09 NOTE — PROGRESS NOTE ADULT - SUBJECTIVE AND OBJECTIVE BOX
Subjective  Seen and examined at bedside, chart reviewed  Had normal bowel movement today, no chest pain or acute discomfort  Reports no depressive symptoms today. Noted to be making marked progress in therapy.  Earlier d/c anticipated by therapists. Ambulating with RW with supervision    ROS  No chest pain, SOB, HA, nausea, abd pain, dysuria .  LBM yesterday 3/7  IDT--Briefly discussed progress today, noted to be improving, meeting therapy goals and almost at baseline    Vital Signs Last 24 Hrs  Vital Signs Last 24 Hrs  T(C): 36.7 (09 Mar 2022 20:06), Max: 36.7 (09 Mar 2022 20:06)  T(F): 98.1 (09 Mar 2022 20:06), Max: 98.1 (09 Mar 2022 20:06)  HR: 87 (09 Mar 2022 20:06) (74 - 87)  BP: 122/50 (09 Mar 2022 20:06) (113/65 - 145/67)  RR: 17 (09 Mar 2022 20:06) (15 - 17)  SpO2: 98% (09 Mar 2022 20:06) (96% - 98%)    Exam  Gen - Comfortable  HEENT -NAD, no asymmetry   Neck - Supple, No limited ROM  Pulm - Clear  Cardiovascular - RRR, S1S2  Abdomen - Soft, NT/ND, +BS  Extremities - No C/C/E, No calf tenderness;  b/l hands interphalangeal joints tenderness and stiffness - unable to fully close fist    Neurologic Exam -                Cognitive - Awake, Alert, AAO x3   Motor -4/5 globally, and 5/5 and ankle  Sensory - Intact to LT except for b/l feet  Coordination - FTN intact  Psychiatric - flat       RECENT LABS/IMAGING                        13.0   9.13  )-----------( 238      ( 07 Mar 2022 06:35 )             39.5     03-07    138  |  103  |  38<H>  ----------------------------<  123<H>  4.5   |  23  |  1.78<H>    Ca    9.1      07 Mar 2022 06:35    TPro  7.7  /  Alb  3.8  /  TBili  0.6  /  DBili  x   /  AST  48<H>  /  ALT  75<H>  /  AlkPhos  58  03-07    CAPILLARY BLOOD GLUCOSE  POCT Blood Glucose.: 157 mg/dL (08 Mar 2022 12:13)  POCT Blood Glucose.: 236 mg/dL (08 Mar 2022 07:57)  POCT Blood Glucose.: 275 mg/dL (07 Mar 2022 21:22)  POCT Blood Glucose.: 200 mg/dL (07 Mar 2022 19:11)    MEDICATIONS  (STANDING):  aspirin enteric coated 81 milliGRAM(s) Oral daily  atorvastatin 80 milliGRAM(s) Oral at bedtime  carvedilol 6.25 milliGRAM(s) Oral every 12 hours  dextrose 10% Bolus 125 milliLiter(s) IV Bolus once  dextrose 10% Bolus 125 milliLiter(s) IV Bolus once  dextrose 10% Bolus 250 milliLiter(s) IV Bolus once  dextrose 40% Gel 15 Gram(s) Oral once  dextrose 5%. 1000 milliLiter(s) (50 mL/Hr) IV Continuous <Continuous>  dextrose 5%. 1000 milliLiter(s) (100 mL/Hr) IV Continuous <Continuous>  furosemide    Tablet 20 milliGRAM(s) Oral daily  gabapentin 200 milliGRAM(s) Oral two times a day  glucagon  Injectable 1 milliGRAM(s) IntraMuscular once  heparin   Injectable 5000 Unit(s) SubCutaneous every 8 hours  hydrALAZINE 10 milliGRAM(s) Oral three times a day  influenza  Vaccine (HIGH DOSE) 0.7 milliLiter(s) IntraMuscular once  insulin glargine Injectable (LANTUS) 25 Unit(s) SubCutaneous at bedtime  insulin lispro (ADMELOG) corrective regimen sliding scale   SubCutaneous three times a day before meals  insulin lispro (ADMELOG) corrective regimen sliding scale   SubCutaneous at bedtime  insulin lispro Injectable (ADMELOG) 6 Unit(s) SubCutaneous three times a day before meals  isosorbide   dinitrate Tablet (ISORDIL) 10 milliGRAM(s) Oral three times a day  multivitamin 1 Tablet(s) Oral daily  pantoprazole    Tablet 40 milliGRAM(s) Oral before breakfast  polyethylene glycol 3350 17 Gram(s) Oral daily  senna 2 Tablet(s) Oral at bedtime  ticagrelor 90 milliGRAM(s) Oral every 12 hours    MEDICATIONS  (PRN):  acetaminophen     Tablet .. 650 milliGRAM(s) Oral every 6 hours PRN Temp greater or equal to 38C (100.4F), Mild Pain (1 - 3), Moderate Pain (4 - 6)  guaiFENesin Oral Liquid (Sugar-Free) 100 milliGRAM(s) Oral every 6 hours PRN Cough  melatonin 3 milliGRAM(s) Oral at bedtime PRN Insomnia

## 2022-03-09 NOTE — PROGRESS NOTE ADULT - PROBLEM SELECTOR PLAN 5
stable,   continue to monitor   Avoid nephrotoxic agents.

## 2022-03-09 NOTE — PROGRESS NOTE ADULT - PROBLEM SELECTOR PLAN 1
NSTEMI s/p GEMMA   - Continue ASA, Brilinta, atorvastatin, Hydralazine, Isordil, Lasix    -OP Cards evaluation  - monitor BP, noted sbp 98 this am asymptomatic on 3/8/22, resolved today. no hypotension noted.
NSTEMI s/p GEMMA   - Continue ASA, Brilinta, atorvastatin, Hydralazine, Isordil, Lasix    -OP Cards evaluation  - TTE with Doppler (w/Cont) (02.27.22 @ 08:50)   Mild segmental left, ventricular systolic dysfunction with hypokinesis of the inferolateral wall. Visual estimate of EF about 45%.  - CKD 3 avoid ACEI/ARB
NSTEMI s/p GEMMA   - Continue ASA, Brilinta, atorvastatin, Hydralazine, Isordil, Lasix    -OP Cards evaluation  - monitor BP, noted sbp 98 this am asymptomatic, if bp remains low can consider decreasing lasix since patient not fluid overloaded and clinically stable

## 2022-03-09 NOTE — DISCHARGE NOTE PROVIDER - NSDCMRMEDTOKEN_GEN_ALL_CORE_FT
aspirin 81 mg oral delayed release tablet: 1 tab(s) orally once a day  atorvastatin 80 mg oral tablet: 1 tab(s) orally once a day (at bedtime)  Basaglar KwikPen 100 units/mL subcutaneous solution: 20 unit(s) subcutaneous once a day (at bedtime)  carvedilol 6.25 mg oral tablet: 1 tab(s) orally every 12 hours  Dexilant 60 mg oral delayed release capsule: 1 cap(s) orally once a day  furosemide 20 mg oral tablet: 1 tab(s) orally once a day  gabapentin 100 mg oral capsule: 2 cap(s) orally 2 times a day  hydrALAZINE 10 mg oral tablet: 1 tab(s) orally 3 times a day  isosorbide dinitrate 10 mg oral tablet: 1 tab(s) orally 3 times a day  Jardiance 25 mg oral tablet: 1 tab(s) orally once a day (in the morning)  Linzess 145 mcg oral capsule: 1 cap(s) orally once a day  polyethylene glycol 3350 oral powder for reconstitution: 17 gram(s) orally once a day  senna oral tablet: 2 tab(s) orally once a day (at bedtime)  ticagrelor 90 mg oral tablet: 1 tab(s) orally every 12 hours  Tradjenta 5 mg oral tablet: 1 tab(s) orally once a day  Vitamin D3 25 mcg (1000 intl units) oral capsule: 1 cap(s) orally once a day   acetaminophen 325 mg oral tablet: 2 tab(s) orally every 6 hours, As needed, Temp greater or equal to 38C (100.4F), Mild Pain (1 - 3), Moderate Pain (4 - 6)  aspirin 81 mg oral delayed release tablet: 1 tab(s) orally once a day  atorvastatin 80 mg oral tablet: 1 tab(s) orally once a day (at bedtime)  Basaglar KwikPen 100 units/mL subcutaneous solution: 25 unit(s) subcutaneous once a day (at bedtime)   carvedilol 6.25 mg oral tablet: 1 tab(s) orally every 12 hours  Commode : Apply topically to affected area once a day MDD:one  Dexilant 60 mg oral delayed release capsule: 1 cap(s) orally once a day  furosemide 20 mg oral tablet: 1 tab(s) orally once a day  gabapentin 100 mg oral capsule: 2 cap(s) orally 2 times a day  hydrALAZINE 10 mg oral tablet: 1 tab(s) orally 3 times a day  isosorbide dinitrate 10 mg oral tablet: 1 tab(s) orally 3 times a day  Jardiance 25 mg oral tablet: 1 tab(s) orally once a day (in the morning)  Multiple Vitamins with Minerals oral tablet: 1 tab(s) orally once a day  Out Patient Physical therapy referral: Apply topically to affected area once a day MDD:one  polyethylene glycol 3350 oral powder for reconstitution: 17 gram(s) orally once a day  senna oral tablet: 2 tab(s) orally once a day (at bedtime)  ticagrelor 90 mg oral tablet: 1 tab(s) orally every 12 hours  Tradjenta 5 mg oral tablet: 1 tab(s) orally once a day  Vitamin D3 25 mcg (1000 intl units) oral capsule: 1 cap(s) orally once a day   acetaminophen 325 mg oral tablet: 2 tab(s) orally every 6 hours, As needed, Temp greater or equal to 38C (100.4F), Mild Pain (1 - 3), Moderate Pain (4 - 6)  aspirin 81 mg oral delayed release tablet: 1 tab(s) orally once a day  atorvastatin 80 mg oral tablet: 1 tab(s) orally once a day (at bedtime)  Basaglar KwikPen 100 units/mL subcutaneous solution: 25 unit(s) subcutaneous once a day (at bedtime)   carvedilol 6.25 mg oral tablet: 1 tab(s) orally every 12 hours  Commode : Apply topically to affected area once a day MDD:one  Dexilant 60 mg oral delayed release capsule: 1 cap(s) orally once a day  furosemide 20 mg oral tablet: 1 tab(s) orally once a day  gabapentin 100 mg oral capsule: 2 cap(s) orally 2 times a day  hydrALAZINE 10 mg oral tablet: 1 tab(s) orally 3 times a day  isosorbide dinitrate 10 mg oral tablet: 1 tab(s) orally 3 times a day  Jardiance 25 mg oral tablet: 1 tab(s) orally once a day (in the morning)  Multiple Vitamins with Minerals oral tablet: 1 tab(s) orally once a day  Out Patient Physical therapy referral: Apply topically to affected area once a day MDD:one  polyethylene glycol 3350 oral powder for reconstitution: 17 gram(s) orally once a day  senna oral tablet: 2 tab(s) orally once a day (at bedtime)  Shower Chair: Dispense 1 shower chair     Diagnosis: STEMI, debility  ICD-10: I21.3  Length of need: 6 months  ticagrelor 90 mg oral tablet: 1 tab(s) orally every 12 hours  Tradjenta 5 mg oral tablet: 1 tab(s) orally once a day  Vitamin D3 25 mcg (1000 intl units) oral capsule: 1 cap(s) orally once a day

## 2022-03-09 NOTE — PROGRESS NOTE ADULT - SUBJECTIVE AND OBJECTIVE BOX
88y old  Male who presents with a chief complaint of NSTEMI    seen at the bedside, no new complaints, no n/v, no sob, no chest pain    Vital Signs Last 24 Hrs  T(C): 36.2 (09 Mar 2022 08:57), Max: 36.3 (08 Mar 2022 21:00)  T(F): 97.2 (09 Mar 2022 08:57), Max: 97.4 (08 Mar 2022 21:00)  HR: 79 (09 Mar 2022 08:57) (74 - 79)  BP: 113/65 (09 Mar 2022 08:57) (113/65 - 133/75)  BP(mean): --  RR: 15 (09 Mar 2022 08:57) (15 - 18)  SpO2: 96% (09 Mar 2022 08:57) (96% - 100%)    GENERAL- NAD  EAR/NOSE/MOUTH/THROAT - no pharyngeal exudates, no oral lesions  MMM  EYES- CAYLA, conjunctiva and Sclera clear  NECK- supple  RESPIRATORY-  clear to auscultation bilaterally, non laboured breathing  CARDIOVASCULAR - SIS2, RRR  GI - soft NT BS present  EXTREMITIES- no pedal edema  NEUROLOGY- no gross focal deficits  PSYCHIATRY- AAO X 3           CAPILLARY BLOOD GLUCOSE      POCT Blood Glucose.: 194 mg/dL (09 Mar 2022 07:31)  POCT Blood Glucose.: 286 mg/dL (08 Mar 2022 21:16)  POCT Blood Glucose.: 147 mg/dL (08 Mar 2022 17:11)  POCT Blood Glucose.: 157 mg/dL (08 Mar 2022 12:13)      MEDICATIONS  (STANDING):  aspirin enteric coated 81 milliGRAM(s) Oral daily  atorvastatin 80 milliGRAM(s) Oral at bedtime  carvedilol 6.25 milliGRAM(s) Oral every 12 hours  furosemide    Tablet 20 milliGRAM(s) Oral daily  gabapentin 200 milliGRAM(s) Oral two times a day  glucagon  Injectable 1 milliGRAM(s) IntraMuscular once  heparin   Injectable 5000 Unit(s) SubCutaneous every 8 hours  hydrALAZINE 10 milliGRAM(s) Oral three times a day  influenza  Vaccine (HIGH DOSE) 0.7 milliLiter(s) IntraMuscular once  insulin glargine Injectable (LANTUS) 25 Unit(s) SubCutaneous at bedtime  insulin lispro (ADMELOG) corrective regimen sliding scale   SubCutaneous three times a day before meals  insulin lispro (ADMELOG) corrective regimen sliding scale   SubCutaneous at bedtime  insulin lispro Injectable (ADMELOG) 6 Unit(s) SubCutaneous three times a day before meals  isosorbide   dinitrate Tablet (ISORDIL) 10 milliGRAM(s) Oral three times a day  multivitamin 1 Tablet(s) Oral daily  pantoprazole    Tablet 40 milliGRAM(s) Oral before breakfast  polyethylene glycol 3350 17 Gram(s) Oral daily  senna 2 Tablet(s) Oral at bedtime  ticagrelor 90 milliGRAM(s) Oral every 12 hours    MEDICATIONS  (PRN):  acetaminophen     Tablet .. 650 milliGRAM(s) Oral every 6 hours PRN Temp greater or equal to 38C (100.4F), Mild Pain (1 - 3), Moderate Pain (4 - 6)  guaiFENesin Oral Liquid (Sugar-Free) 100 milliGRAM(s) Oral every 6 hours PRN Cough  melatonin 3 milliGRAM(s) Oral at bedtime PRN Insomnia

## 2022-03-09 NOTE — DISCHARGE NOTE PROVIDER - HOSPITAL COURSE
HPI:  88 year-old man with past medical history of CAD s/p CABG (1999), PCI stents x 5 (2019), PVD with stent place to Right Posterior Tibial Artery? (2021), T2DM, HTN, HLD, CKD, presented with near syncope and chest pain.  Patient's son states he has been feeling generalized fatigue for past few weeks but this morning nearly collapsed when standing and developed chest pressure.  Called his cardiologist, Dr. Corona, who advised he go to ED for evaluation.  Pt continues to report chest discomfort and generalized fatigue.  Was due for dental appt this morning and has not taken asa for few days. In ED EKG showed ROCIO in aVR ,STD in I, aVL,V2 to V6 with 1st degree /LAFB/ RBBB. Received asa 325mg PO x1 , Brilinta 180mg PO x1 and heparin drip for ACS. Trop 143. Received insulin/dex 50% and mike gluconate IV and lokema  for K+ 5.9. He was taken to cath lab for ongoing chest discomfort.  Patient went urgently to cath lab for sten to SVG to OM1 with IABP placed. Patient had IABP removed and s/p Drug eluting stent, now on ASA81 and Brilinta. Downgraded from CCU. Patient also c/o difficulty closing bilateral hands 2/2 worsening arthritis. Seen by PM&R and deemed candidate for acute inpatient rehab. Pt admitted to [facility] for acute rehab on 3/5/22. (05 Mar 2022 13:45)      Rehab course significant for depression but declined neuropsychology and psychiatry evaluation but agreeable to recreational therapy.     Functional Status:      All other medical co-morbidities were stable. Patient tolerated course of inpatient PT/OT/SLP rehab with significant improvements and met rehab goals prior to discharge. Discharge instructions were discussed with patient and family. All questions answered and all concerns addressed. Patient was medically cleared for discharge to home. HPI:  88 year-old man with past medical history of CAD s/p CABG (1999), PCI stents x 5 (2019), PVD with stent place to Right Posterior Tibial Artery? (2021), T2DM, HTN, HLD, CKD, presented with near syncope and chest pain.  Patient's son states he has been feeling generalized fatigue for past few weeks but this morning nearly collapsed when standing and developed chest pressure.  Called his cardiologist, Dr. Corona, who advised he go to ED for evaluation.  Pt continues to report chest discomfort and generalized fatigue.  Was due for dental appt this morning and has not taken asa for few days. In ED EKG showed ROCIO in aVR ,STD in I, aVL,V2 to V6 with 1st degree /LAFB/ RBBB. Received asa 325mg PO x1 , Brilinta 180mg PO x1 and heparin drip for ACS. Trop 143. Received insulin/dex 50% and mike gluconate IV and lokema  for K+ 5.9. He was taken to cath lab for ongoing chest discomfort.  Patient went urgently to cath lab for sten to SVG to OM1 with IABP placed. Patient had IABP removed and s/p Drug eluting stent, now on ASA81 and Brilinta. Downgraded from CCU. Patient also c/o difficulty closing bilateral hands 2/2 worsening arthritis. Seen by PM&R and deemed candidate for acute inpatient rehab. Pt admitted to [facility] for acute rehab on 3/5/22. (05 Mar 2022 13:45)      Rehab course significant for depression but declined neuropsychology and psychiatry evaluation but agreeable to recreational therapy. He was anxious 3/9 at night when son was at bedside because he wanted to go home. Son and patient agreed to Xanax 0.25mg last night.     Functional Status:  Ambulating >50FT with supervision  Tolerating oral diet  Upper body dressing with supervision, lower body dressing with min assistance    All other medical co-morbidities were stable. Patient tolerated course of inpatient PT/OT/SLP rehab with significant improvements and met rehab goals prior to discharge. Discharge instructions were discussed with patient and family. All questions answered and all concerns addressed. Patient was medically cleared for discharge to home. HPI:  88 year-old man with past medical history of CAD s/p CABG (1999), PCI stents x 5 (2019), PVD with stent place to Right Posterior Tibial Artery? (2021), T2DM, HTN, HLD, CKD, presented with near syncope and chest pain.  Patient's son states he has been feeling generalized fatigue for past few weeks but this morning nearly collapsed when standing and developed chest pressure.  Called his cardiologist, Dr. Corona, who advised he go to ED for evaluation.  Pt continues to report chest discomfort and generalized fatigue.  Was due for dental appt this morning and has not taken asa for few days. In ED EKG showed ROCIO in aVR ,STD in I, aVL,V2 to V6 with 1st degree /LAFB/ RBBB. Received asa 325mg PO x1 , Brilinta 180mg PO x1 and heparin drip for ACS. Trop 143. Received insulin/dex 50% and mike gluconate IV and lokema  for K+ 5.9. He was taken to cath lab for ongoing chest discomfort.  Patient went urgently to cath lab for sten to SVG to OM1 with IABP placed. Patient had IABP removed and s/p Drug eluting stent, now on ASA81 and Brilinta. Downgraded from CCU. Patient also c/o difficulty closing bilateral hands 2/2 worsening arthritis. Seen by PM&R and deemed candidate for acute inpatient rehab. Pt admitted to [facility] for acute rehab on 3/5/22. (05 Mar 2022 13:45)      Rehab course significant for depression but declined neuropsychology and psychiatry evaluation but agreeable to recreational therapy.   He had one dose of xanax for episodic anxiety one night, but settled afterwards  He met therapy goals, ambulating with supervision, with RW for functional distance    Functional Status:  Ambulating >50FT with supervision  Tolerating oral diet  Upper body dressing with supervision, lower body dressing with min assistance    All other medical co-morbidities were stable. Patient tolerated course of inpatient PT/OT/SLP rehab with significant improvements and met rehab goals prior to discharge. Discharge instructions were discussed with patient and family. All questions answered and all concerns addressed. Patient was medically cleared for discharge to home.

## 2022-03-09 NOTE — DISCHARGE NOTE PROVIDER - NSDCCPCAREPLAN_GEN_ALL_CORE_FT
PRINCIPAL DISCHARGE DIAGNOSIS  Diagnosis: ST elevation MI (STEMI)  Assessment and Plan of Treatment: Continue your medications (Coreg, Hydralazine, Lasix, Aspirin, Brilinta, and Isordil) as prescribed. Follow up with your cardiologist upon discharge. Follow up with your physiatrist (rehab doctor), Dr. Levin, upon discharge.      SECONDARY DISCHARGE DIAGNOSES  Diagnosis: Diabetes  Assessment and Plan of Treatment: Continue taking Lantus injections and Jardiance and Tradjenta as prescribed. Follow up with your endocrinologist, Dr. Tessa Ulloa, upon discharge.     PRINCIPAL DISCHARGE DIAGNOSIS  Diagnosis: ST elevation MI (STEMI)  Assessment and Plan of Treatment: Continue your medications (Coreg, Hydralazine, Lasix, Aspirin, Brilinta, and Isordil) as prescribed. Follow up with cardiology within 1-2 weeks of discharge. If you are in need of a general cardiologist after discharge, you may contact the [facility] for an appointment at [phone]. Follow up with your physiatrist (rehab doctor), Dr. Levin, upon discharge.      SECONDARY DISCHARGE DIAGNOSES  Diagnosis: Diabetes  Assessment and Plan of Treatment: Continue taking Lantus injections and Jardiance and Tradjenta as prescribed. Follow up with endocrinology within 1-2 weeks of discharge. You may follow up with Dr. Tessa Ulloa or with Health system at [address] [place] [address]. [place] [address] - [address].  Phone: [phone]    Diagnosis: Renal lesion  Assessment and Plan of Treatment: You underwent a kidney ultrasound and incidentally found to have a lesion on your left kidney. You will need an outpatient MRI and outpatient follow up with urology. Follow up with urology in 3-4 weeks. You may follow up at [facility] located at [address], [address], [place] [address]. Call [phone] for an appointment.

## 2022-03-09 NOTE — PROGRESS NOTE ADULT - PROBLEM SELECTOR PLAN 2
A1C 8.8   - increase Lantus to 25 hs, c/w pre meal, ISS, Accu-Chek  home med- Tradjenta, basalgar 40 u Z Plasty Text: The lesion was extirpated to the level of the fat with a #15 scalpel blade.  Given the location of the defect, shape of the defect and the proximity to free margins a Z-plasty was deemed most appropriate for repair.  Using a sterile surgical marker, the appropriate transposition arms of the Z-plasty were drawn incorporating the defect and placing the expected incisions within the relaxed skin tension lines where possible.    The area thus outlined was incised deep to adipose tissue with a #15 scalpel blade.  The skin margins were undermined to an appropriate distance in all directions utilizing iris scissors.  The opposing transposition arms were then transposed into place in opposite direction and anchored with interrupted buried subcutaneous sutures.

## 2022-03-09 NOTE — DISCHARGE NOTE PROVIDER - CARE PROVIDER_API CALL
Zenia Rubin)  Cardiovascular Disease  [facility]-Dept of Cardiology, [address] [place] [address], [address]  [place] [address]  Phone: [phone]  Fax: [phone]  Follow Up Time:     Uriel Levin; MPH)  Surgery  Phys Medicine  [facility]  [address] [place] [address]  Phone: [phone]  Fax: [phone]  Follow Up Time:

## 2022-03-10 ENCOUNTER — TRANSCRIPTION ENCOUNTER (OUTPATIENT)
Age: 87
End: 2022-03-10

## 2022-03-10 VITALS
OXYGEN SATURATION: 98 % | DIASTOLIC BLOOD PRESSURE: 56 MMHG | TEMPERATURE: 98 F | HEART RATE: 75 BPM | SYSTOLIC BLOOD PRESSURE: 123 MMHG | RESPIRATION RATE: 15 BRPM

## 2022-03-10 LAB
ALBUMIN SERPL ELPH-MCNC: 3.6 G/DL — SIGNIFICANT CHANGE UP (ref 3.3–5)
ALP SERPL-CCNC: 60 U/L — SIGNIFICANT CHANGE UP (ref 40–120)
ALT FLD-CCNC: 97 U/L — HIGH (ref 10–45)
ANION GAP SERPL CALC-SCNC: 14 MMOL/L — SIGNIFICANT CHANGE UP (ref 5–17)
AST SERPL-CCNC: 56 U/L — HIGH (ref 10–40)
BASOPHILS # BLD AUTO: 0.04 K/UL — SIGNIFICANT CHANGE UP (ref 0–0.2)
BASOPHILS NFR BLD AUTO: 0.6 % — SIGNIFICANT CHANGE UP (ref 0–2)
BILIRUB SERPL-MCNC: 0.5 MG/DL — SIGNIFICANT CHANGE UP (ref 0.2–1.2)
BUN SERPL-MCNC: 41 MG/DL — HIGH (ref 7–23)
CALCIUM SERPL-MCNC: 8.8 MG/DL — SIGNIFICANT CHANGE UP (ref 8.4–10.5)
CHLORIDE SERPL-SCNC: 105 MMOL/L — SIGNIFICANT CHANGE UP (ref 96–108)
CO2 SERPL-SCNC: 23 MMOL/L — SIGNIFICANT CHANGE UP (ref 22–31)
CREAT SERPL-MCNC: 1.92 MG/DL — HIGH (ref 0.5–1.3)
EGFR: 33 ML/MIN/1.73M2 — LOW
EOSINOPHIL # BLD AUTO: 0.31 K/UL — SIGNIFICANT CHANGE UP (ref 0–0.5)
EOSINOPHIL NFR BLD AUTO: 4.5 % — SIGNIFICANT CHANGE UP (ref 0–6)
GLUCOSE BLDC GLUCOMTR-MCNC: 269 MG/DL — HIGH (ref 70–99)
GLUCOSE BLDC GLUCOMTR-MCNC: 85 MG/DL — SIGNIFICANT CHANGE UP (ref 70–99)
GLUCOSE SERPL-MCNC: 88 MG/DL — SIGNIFICANT CHANGE UP (ref 70–99)
HCT VFR BLD CALC: 34.6 % — LOW (ref 39–50)
HGB BLD-MCNC: 11.6 G/DL — LOW (ref 13–17)
IMM GRANULOCYTES NFR BLD AUTO: 0.7 % — SIGNIFICANT CHANGE UP (ref 0–1.5)
LYMPHOCYTES # BLD AUTO: 1.73 K/UL — SIGNIFICANT CHANGE UP (ref 1–3.3)
LYMPHOCYTES # BLD AUTO: 25 % — SIGNIFICANT CHANGE UP (ref 13–44)
MCHC RBC-ENTMCNC: 31 PG — SIGNIFICANT CHANGE UP (ref 27–34)
MCHC RBC-ENTMCNC: 33.5 GM/DL — SIGNIFICANT CHANGE UP (ref 32–36)
MCV RBC AUTO: 92.5 FL — SIGNIFICANT CHANGE UP (ref 80–100)
MONOCYTES # BLD AUTO: 0.83 K/UL — SIGNIFICANT CHANGE UP (ref 0–0.9)
MONOCYTES NFR BLD AUTO: 12 % — SIGNIFICANT CHANGE UP (ref 2–14)
NEUTROPHILS # BLD AUTO: 3.95 K/UL — SIGNIFICANT CHANGE UP (ref 1.8–7.4)
NEUTROPHILS NFR BLD AUTO: 57.2 % — SIGNIFICANT CHANGE UP (ref 43–77)
NRBC # BLD: 0 /100 WBCS — SIGNIFICANT CHANGE UP (ref 0–0)
PLATELET # BLD AUTO: 266 K/UL — SIGNIFICANT CHANGE UP (ref 150–400)
POTASSIUM SERPL-MCNC: 4.2 MMOL/L — SIGNIFICANT CHANGE UP (ref 3.5–5.3)
POTASSIUM SERPL-SCNC: 4.2 MMOL/L — SIGNIFICANT CHANGE UP (ref 3.5–5.3)
PROT SERPL-MCNC: 7.4 G/DL — SIGNIFICANT CHANGE UP (ref 6–8.3)
RBC # BLD: 3.74 M/UL — LOW (ref 4.2–5.8)
RBC # FLD: 14.6 % — HIGH (ref 10.3–14.5)
SODIUM SERPL-SCNC: 142 MMOL/L — SIGNIFICANT CHANGE UP (ref 135–145)
WBC # BLD: 6.91 K/UL — SIGNIFICANT CHANGE UP (ref 3.8–10.5)
WBC # FLD AUTO: 6.91 K/UL — SIGNIFICANT CHANGE UP (ref 3.8–10.5)

## 2022-03-10 PROCEDURE — 99239 HOSP IP/OBS DSCHRG MGMT >30: CPT

## 2022-03-10 RX ORDER — EMPAGLIFLOZIN 10 MG/1
1 TABLET, FILM COATED ORAL
Qty: 30 | Refills: 0
Start: 2022-03-10 | End: 2022-04-08

## 2022-03-10 RX ORDER — EMPAGLIFLOZIN 10 MG/1
1 TABLET, FILM COATED ORAL
Qty: 0 | Refills: 0 | DISCHARGE

## 2022-03-10 RX ORDER — FUROSEMIDE 40 MG
1 TABLET ORAL
Qty: 0 | Refills: 0 | DISCHARGE

## 2022-03-10 RX ORDER — INSULIN GLARGINE 100 [IU]/ML
23 INJECTION, SOLUTION SUBCUTANEOUS
Refills: 0 | DISCHARGE
Start: 2022-03-10 | End: 2022-04-08

## 2022-03-10 RX ORDER — CARVEDILOL PHOSPHATE 80 MG/1
1 CAPSULE, EXTENDED RELEASE ORAL
Qty: 60 | Refills: 0
Start: 2022-03-10 | End: 2022-04-08

## 2022-03-10 RX ORDER — HYDRALAZINE HCL 50 MG
1 TABLET ORAL
Qty: 90 | Refills: 0
Start: 2022-03-10 | End: 2022-04-08

## 2022-03-10 RX ORDER — GABAPENTIN 400 MG/1
4 CAPSULE ORAL
Qty: 0 | Refills: 0 | DISCHARGE
Start: 2022-03-10 | End: 2022-04-08

## 2022-03-10 RX ORDER — INSULIN GLARGINE 100 [IU]/ML
20 INJECTION, SOLUTION SUBCUTANEOUS
Qty: 0 | Refills: 0 | DISCHARGE

## 2022-03-10 RX ORDER — GABAPENTIN 400 MG/1
2 CAPSULE ORAL
Qty: 120 | Refills: 0
Start: 2022-03-10 | End: 2022-04-08

## 2022-03-10 RX ORDER — TICAGRELOR 90 MG/1
1 TABLET ORAL
Qty: 60 | Refills: 0
Start: 2022-03-10 | End: 2022-04-08

## 2022-03-10 RX ORDER — INSULIN GLARGINE 100 [IU]/ML
25 INJECTION, SOLUTION SUBCUTANEOUS
Qty: 7.5 | Refills: 0
Start: 2022-03-10 | End: 2022-04-08

## 2022-03-10 RX ORDER — ISOSORBIDE DINITRATE 5 MG/1
1 TABLET ORAL
Qty: 90 | Refills: 0
Start: 2022-03-10 | End: 2022-04-08

## 2022-03-10 RX ORDER — INSULIN GLARGINE 100 [IU]/ML
20 INJECTION, SOLUTION SUBCUTANEOUS
Qty: 0 | Refills: 0 | DISCHARGE
Start: 2022-03-10 | End: 2022-04-08

## 2022-03-10 RX ORDER — FUROSEMIDE 40 MG
1 TABLET ORAL
Qty: 30 | Refills: 0
Start: 2022-03-10 | End: 2022-04-08

## 2022-03-10 RX ORDER — LINAGLIPTIN 5 MG/1
1 TABLET, FILM COATED ORAL
Qty: 30 | Refills: 0 | DISCHARGE
Start: 2022-03-10 | End: 2022-04-08

## 2022-03-10 RX ORDER — LINAGLIPTIN 5 MG/1
1 TABLET, FILM COATED ORAL
Qty: 30 | Refills: 0
Start: 2022-03-10 | End: 2022-04-08

## 2022-03-10 RX ORDER — ACETAMINOPHEN 500 MG
2 TABLET ORAL
Qty: 0 | Refills: 0 | DISCHARGE
Start: 2022-03-10

## 2022-03-10 RX ORDER — POLYETHYLENE GLYCOL 3350 17 G/17G
17 POWDER, FOR SOLUTION ORAL
Qty: 0 | Refills: 0 | DISCHARGE
Start: 2022-03-10

## 2022-03-10 RX ORDER — SENNA PLUS 8.6 MG/1
2 TABLET ORAL
Qty: 0 | Refills: 0 | DISCHARGE
Start: 2022-03-10

## 2022-03-10 RX ORDER — ASPIRIN/CALCIUM CARB/MAGNESIUM 324 MG
1 TABLET ORAL
Qty: 30 | Refills: 0
Start: 2022-03-10 | End: 2022-04-08

## 2022-03-10 RX ORDER — LINACLOTIDE 145 UG/1
1 CAPSULE, GELATIN COATED ORAL
Qty: 0 | Refills: 0 | DISCHARGE

## 2022-03-10 RX ORDER — ATORVASTATIN CALCIUM 80 MG/1
1 TABLET, FILM COATED ORAL
Qty: 30 | Refills: 0
Start: 2022-03-10 | End: 2022-04-08

## 2022-03-10 RX ORDER — MULTIVIT-MIN/FERROUS GLUCONATE 9 MG/15 ML
1 LIQUID (ML) ORAL
Qty: 0 | Refills: 0 | DISCHARGE
Start: 2022-03-10

## 2022-03-10 RX ADMIN — CARVEDILOL PHOSPHATE 6.25 MILLIGRAM(S): 80 CAPSULE, EXTENDED RELEASE ORAL at 05:54

## 2022-03-10 RX ADMIN — TICAGRELOR 90 MILLIGRAM(S): 90 TABLET ORAL at 05:53

## 2022-03-10 RX ADMIN — Medication 1 TABLET(S): at 11:50

## 2022-03-10 RX ADMIN — Medication 6: at 11:49

## 2022-03-10 RX ADMIN — ISOSORBIDE DINITRATE 10 MILLIGRAM(S): 5 TABLET ORAL at 11:49

## 2022-03-10 RX ADMIN — POLYETHYLENE GLYCOL 3350 17 GRAM(S): 17 POWDER, FOR SOLUTION ORAL at 11:50

## 2022-03-10 RX ADMIN — PANTOPRAZOLE SODIUM 40 MILLIGRAM(S): 20 TABLET, DELAYED RELEASE ORAL at 05:53

## 2022-03-10 RX ADMIN — Medication 10 MILLIGRAM(S): at 05:54

## 2022-03-10 RX ADMIN — GABAPENTIN 200 MILLIGRAM(S): 400 CAPSULE ORAL at 05:54

## 2022-03-10 RX ADMIN — HEPARIN SODIUM 5000 UNIT(S): 5000 INJECTION INTRAVENOUS; SUBCUTANEOUS at 05:54

## 2022-03-10 RX ADMIN — ISOSORBIDE DINITRATE 10 MILLIGRAM(S): 5 TABLET ORAL at 05:53

## 2022-03-10 RX ADMIN — Medication 20 MILLIGRAM(S): at 05:53

## 2022-03-10 RX ADMIN — Medication 6 UNIT(S): at 11:49

## 2022-03-10 RX ADMIN — Medication 81 MILLIGRAM(S): at 11:50

## 2022-03-10 NOTE — PROGRESS NOTE ADULT - NUTRITIONAL ASSESSMENT
This patient has been assessed with a concern for Malnutrition and has been determined to have a diagnosis/diagnoses of Moderate protein-calorie malnutrition.    This patient is being managed with:   Diet Consistent Carbohydrate w/Evening Snack-  Kos[employer]  [employer] Vegetarian (Accepts Fish)  Entered: Mar  5 2022  2:53PM    

## 2022-03-10 NOTE — PROGRESS NOTE ADULT - ATTENDING COMMENTS
Seen with Dr GEORGE Chavez, resident and Ms Diaz, med student    87 y/o  on acute rehab after STEMI with stenting x 2 vessels  Hx of CAD S/P CABG    No acute complaint  Med functional gaits, back to baseline  Ambulating functional distance with supervision--150ft with RW  MMT 5/5     Lab unremarkable    Dx: Debility post STEMI S/P Cardiac stenting  PT/OT today and dc home afterwards  F/u with PCP and cardiology

## 2022-03-10 NOTE — PROGRESS NOTE ADULT - SUBJECTIVE AND OBJECTIVE BOX
SUBJECTIVE  Patient seen and assessed in  in therapy. No acute events overnight. Last night he was anxious and wanted to go home. Son was at bedside at the time. Patient and son agreed for him to take Xanax 0.25mg last night. Patient slept well. This AM, patient endorsed wanting to go home tonight.     ROS  No chest pain, SOB, HA, nausea, abd pain, dysuria.      Vital Signs Last 24 Hrs  T(C): 36.9 (10 Mar 2022 08:19), Max: 36.9 (10 Mar 2022 08:19)  T(F): 98.4 (10 Mar 2022 08:19), Max: 98.4 (10 Mar 2022 08:19)  HR: 75 (10 Mar 2022 08:19) (75 - 87)  BP: 123/56 (10 Mar 2022 08:19) (122/50 - 145/67)  BP(mean): --  RR: 15 (10 Mar 2022 08:19) (15 - 17)  SpO2: 98% (10 Mar 2022 08:19) (98% - 100%)    Exam  Gen - Comfortable  HEENT -NAD, no asymmetry   Neck - Supple, No limited ROM  Pulm - Clear  Cardiovascular - RRR, S1S2  Abdomen - Soft, NT/ND, +BS  Extremities - No C/C/E, No calf tenderness;  b/l hands interphalangeal joints tenderness and stiffness - unable to fully close fist    Neurologic Exam -                Cognitive - Awake, Alert, AAO x3   Motor -4/5 globally, and 5/5 and ankle  Sensory - Intact to LT except for b/l feet  Coordination - FTN intact  Psychiatric - flat       RECENT LABS               11.6   6.91  )-----------( 266      ( 10 Mar 2022 06:19 )             34.6     03-10    142  |  105  |  41<H>  ----------------------------<  88  4.2   |  23  |  1.92<H>    Ca    8.8      10 Mar 2022 06:19    TPro  7.4  /  Alb  3.6  /  TBili  0.5  /  DBili  x   /  AST  56<H>  /  ALT  97<H>  /  AlkPhos  60  03-10        CAPILLARY BLOOD GLUCOSE      POCT Blood Glucose.: 269 mg/dL (10 Mar 2022 11:48)  POCT Blood Glucose.: 85 mg/dL (10 Mar 2022 07:44)  POCT Blood Glucose.: 273 mg/dL (09 Mar 2022 21:12)  POCT Blood Glucose.: 89 mg/dL (09 Mar 2022 17:02)          MEDICATIONS  (STANDING):  aspirin enteric coated 81 milliGRAM(s) Oral daily  atorvastatin 80 milliGRAM(s) Oral at bedtime  carvedilol 6.25 milliGRAM(s) Oral every 12 hours  dextrose 10% Bolus 250 milliLiter(s) IV Bolus once  dextrose 10% Bolus 125 milliLiter(s) IV Bolus once  dextrose 10% Bolus 125 milliLiter(s) IV Bolus once  dextrose 40% Gel 15 Gram(s) Oral once  dextrose 5%. 1000 milliLiter(s) (50 mL/Hr) IV Continuous <Continuous>  dextrose 5%. 1000 milliLiter(s) (100 mL/Hr) IV Continuous <Continuous>  furosemide    Tablet 20 milliGRAM(s) Oral daily  gabapentin 200 milliGRAM(s) Oral two times a day  glucagon  Injectable 1 milliGRAM(s) IntraMuscular once  heparin   Injectable 5000 Unit(s) SubCutaneous every 8 hours  hydrALAZINE 10 milliGRAM(s) Oral three times a day  influenza  Vaccine (HIGH DOSE) 0.7 milliLiter(s) IntraMuscular once  insulin glargine Injectable (LANTUS) 25 Unit(s) SubCutaneous at bedtime  insulin lispro (ADMELOG) corrective regimen sliding scale   SubCutaneous three times a day before meals  insulin lispro (ADMELOG) corrective regimen sliding scale   SubCutaneous at bedtime  insulin lispro Injectable (ADMELOG) 6 Unit(s) SubCutaneous three times a day before meals  isosorbide   dinitrate Tablet (ISORDIL) 10 milliGRAM(s) Oral three times a day  multivitamin 1 Tablet(s) Oral daily  pantoprazole    Tablet 40 milliGRAM(s) Oral before breakfast  polyethylene glycol 3350 17 Gram(s) Oral daily  senna 2 Tablet(s) Oral at bedtime  ticagrelor 90 milliGRAM(s) Oral every 12 hours    MEDICATIONS  (PRN):  acetaminophen     Tablet .. 650 milliGRAM(s) Oral every 6 hours PRN Temp greater or equal to 38C (100.4F), Mild Pain (1 - 3), Moderate Pain (4 - 6)  guaiFENesin Oral Liquid (Sugar-Free) 100 milliGRAM(s) Oral every 6 hours PRN Cough  melatonin 3 milliGRAM(s) Oral at bedtime PRN Insomnia                         SUBJECTIVE  Patient seen and assessed in WC in therapy. No acute events overnight. Last night he was anxious and wanted to go home.   Son was at bedside at the time.   Patient and son agreed for him to take Xanax 0.25mg last night.   Patient slept well. This AM, patient endorsed wanting to go home tonight.     ROS  No chest pain, SOB, HA, nausea, abd pain, dysuria.    Lab results today--unremarkable    Engaged in therapy, IDT today--therapists noted that patient has made functional gains and back to his baseline function    Vital Signs Last 24 Hrs  T(C): 36.9 (10 Mar 2022 08:19), Max: 36.9 (10 Mar 2022 08:19)  T(F): 98.4 (10 Mar 2022 08:19), Max: 98.4 (10 Mar 2022 08:19)  HR: 75 (10 Mar 2022 08:19) (75 - 87)  BP: 123/56 (10 Mar 2022 08:19) (122/50 - 145/67)  RR: 15 (10 Mar 2022 08:19) (15 - 17)  SpO2: 98% (10 Mar 2022 08:19) (98% - 100%)    Exam  Gen - Comfortable  HEENT -NAD, no asymmetry   Neck - Supple, No limited ROM  Pulm - Clear  Cardiovascular - RRR, S1S2  Abdomen - Soft, NT/ND, +BS  Extremities - No C/C/E, No calf tenderness;  b/l hands interphalangeal joints tenderness and stiffness - unable to fully close fist    Neurologic Exam -                Cognitive - Awake, Alert, AAO x3   Motor -4/5 globally, and 5/5 and ankle  Sensory - Intact to LT except for b/l feet  Coordination - FTN intact  Psychiatric - flat       RECENT LABS               11.6   6.91  )-----------( 266      ( 10 Mar 2022 06:19 )             34.6     03-10    142  |  105  |  41<H>  ----------------------------<  88  4.2   |  23  |  1.92<H>    Ca    8.8      10 Mar 2022 06:19    TPro  7.4  /  Alb  3.6  /  TBili  0.5  /  DBili  x   /  AST  56<H>  /  ALT  97<H>  /  AlkPhos  60  03-10        CAPILLARY BLOOD GLUCOSE      POCT Blood Glucose.: 269 mg/dL (10 Mar 2022 11:48)  POCT Blood Glucose.: 85 mg/dL (10 Mar 2022 07:44)  POCT Blood Glucose.: 273 mg/dL (09 Mar 2022 21:12)  POCT Blood Glucose.: 89 mg/dL (09 Mar 2022 17:02)          MEDICATIONS  (STANDING):  aspirin enteric coated 81 milliGRAM(s) Oral daily  atorvastatin 80 milliGRAM(s) Oral at bedtime  carvedilol 6.25 milliGRAM(s) Oral every 12 hours  dextrose 10% Bolus 250 milliLiter(s) IV Bolus once  dextrose 10% Bolus 125 milliLiter(s) IV Bolus once  dextrose 10% Bolus 125 milliLiter(s) IV Bolus once  dextrose 40% Gel 15 Gram(s) Oral once  dextrose 5%. 1000 milliLiter(s) (50 mL/Hr) IV Continuous <Continuous>  dextrose 5%. 1000 milliLiter(s) (100 mL/Hr) IV Continuous <Continuous>  furosemide    Tablet 20 milliGRAM(s) Oral daily  gabapentin 200 milliGRAM(s) Oral two times a day  glucagon  Injectable 1 milliGRAM(s) IntraMuscular once  heparin   Injectable 5000 Unit(s) SubCutaneous every 8 hours  hydrALAZINE 10 milliGRAM(s) Oral three times a day  influenza  Vaccine (HIGH DOSE) 0.7 milliLiter(s) IntraMuscular once  insulin glargine Injectable (LANTUS) 25 Unit(s) SubCutaneous at bedtime  insulin lispro (ADMELOG) corrective regimen sliding scale   SubCutaneous three times a day before meals  insulin lispro (ADMELOG) corrective regimen sliding scale   SubCutaneous at bedtime  insulin lispro Injectable (ADMELOG) 6 Unit(s) SubCutaneous three times a day before meals  isosorbide   dinitrate Tablet (ISORDIL) 10 milliGRAM(s) Oral three times a day  multivitamin 1 Tablet(s) Oral daily  pantoprazole    Tablet 40 milliGRAM(s) Oral before breakfast  polyethylene glycol 3350 17 Gram(s) Oral daily  senna 2 Tablet(s) Oral at bedtime  ticagrelor 90 milliGRAM(s) Oral every 12 hours    MEDICATIONS  (PRN):  acetaminophen     Tablet .. 650 milliGRAM(s) Oral every 6 hours PRN Temp greater or equal to 38C (100.4F), Mild Pain (1 - 3), Moderate Pain (4 - 6)  guaiFENesin Oral Liquid (Sugar-Free) 100 milliGRAM(s) Oral every 6 hours PRN Cough  melatonin 3 milliGRAM(s) Oral at bedtime PRN Insomnia

## 2022-03-18 NOTE — ED ADULT TRIAGE NOTE - CCCP TRG CHIEF CMPLNT
No retinal holes or tears seen on exam. Recommended observation. We reviewed the signs and symptoms of retinal tear/retinal detachment and the importance of prompt evaluation should there be increasing floaters, new flashing lights, or decreasing peripheral vision in either eye at any time. Patient understands condition, prognosis and need for follow up care. syncope

## 2022-03-22 PROCEDURE — 97167 OT EVAL HIGH COMPLEX 60 MIN: CPT

## 2022-03-22 PROCEDURE — 97116 GAIT TRAINING THERAPY: CPT

## 2022-03-22 PROCEDURE — 97535 SELF CARE MNGMENT TRAINING: CPT

## 2022-03-22 PROCEDURE — 85025 COMPLETE CBC W/AUTO DIFF WBC: CPT

## 2022-03-22 PROCEDURE — 97110 THERAPEUTIC EXERCISES: CPT

## 2022-03-22 PROCEDURE — 97163 PT EVAL HIGH COMPLEX 45 MIN: CPT

## 2022-03-22 PROCEDURE — 97530 THERAPEUTIC ACTIVITIES: CPT

## 2022-03-22 PROCEDURE — 36415 COLL VENOUS BLD VENIPUNCTURE: CPT

## 2022-03-22 PROCEDURE — 82962 GLUCOSE BLOOD TEST: CPT

## 2022-03-22 PROCEDURE — 87635 SARS-COV-2 COVID-19 AMP PRB: CPT

## 2022-03-22 PROCEDURE — 80053 COMPREHEN METABOLIC PANEL: CPT

## 2022-05-21 ENCOUNTER — INPATIENT (INPATIENT)
Facility: HOSPITAL | Age: 87
LOS: 5 days | Discharge: HOME HEALTH | End: 2022-05-27
Attending: INTERNAL MEDICINE | Admitting: INTERNAL MEDICINE
Payer: MEDICARE

## 2022-05-21 VITALS
RESPIRATION RATE: 18 BRPM | DIASTOLIC BLOOD PRESSURE: 43 MMHG | TEMPERATURE: 99 F | OXYGEN SATURATION: 98 % | HEART RATE: 85 BPM | HEIGHT: 68 IN | SYSTOLIC BLOOD PRESSURE: 120 MMHG

## 2022-05-21 DIAGNOSIS — Z98.89 OTHER SPECIFIED POSTPROCEDURAL STATES: Chronic | ICD-10-CM

## 2022-05-21 DIAGNOSIS — Z95.5 PRESENCE OF CORONARY ANGIOPLASTY IMPLANT AND GRAFT: Chronic | ICD-10-CM

## 2022-05-21 PROCEDURE — 93010 ELECTROCARDIOGRAM REPORT: CPT

## 2022-05-21 PROCEDURE — 99285 EMERGENCY DEPT VISIT HI MDM: CPT | Mod: CS,25

## 2022-05-21 NOTE — ED ADULT TRIAGE NOTE - CHIEF COMPLAINT QUOTE
Pt c/o chest heaviness and SOB x 2 days. States symptoms have been constant, and had similar presentation when he had MI/stent placement in February. Denies nausea/vomiting. PMHx CVA, MI, CAD w/ stent, DM, HTN

## 2022-05-22 DIAGNOSIS — I50.23 ACUTE ON CHRONIC SYSTOLIC (CONGESTIVE) HEART FAILURE: ICD-10-CM

## 2022-05-22 DIAGNOSIS — N18.9 CHRONIC KIDNEY DISEASE, UNSPECIFIED: ICD-10-CM

## 2022-05-22 DIAGNOSIS — E11.9 TYPE 2 DIABETES MELLITUS WITHOUT COMPLICATIONS: ICD-10-CM

## 2022-05-22 DIAGNOSIS — E78.5 HYPERLIPIDEMIA, UNSPECIFIED: ICD-10-CM

## 2022-05-22 DIAGNOSIS — I25.10 ATHEROSCLEROTIC HEART DISEASE OF NATIVE CORONARY ARTERY WITHOUT ANGINA PECTORIS: ICD-10-CM

## 2022-05-22 DIAGNOSIS — I73.9 PERIPHERAL VASCULAR DISEASE, UNSPECIFIED: ICD-10-CM

## 2022-05-22 DIAGNOSIS — I10 ESSENTIAL (PRIMARY) HYPERTENSION: ICD-10-CM

## 2022-05-22 DIAGNOSIS — R06.02 SHORTNESS OF BREATH: ICD-10-CM

## 2022-05-22 LAB
ALBUMIN SERPL ELPH-MCNC: 3.9 G/DL — SIGNIFICANT CHANGE UP (ref 3.3–5)
ALP SERPL-CCNC: 66 U/L — SIGNIFICANT CHANGE UP (ref 40–120)
ALT FLD-CCNC: 16 U/L — SIGNIFICANT CHANGE UP (ref 4–41)
ANION GAP SERPL CALC-SCNC: 11 MMOL/L — SIGNIFICANT CHANGE UP (ref 7–14)
APTT BLD: 30.9 SEC — SIGNIFICANT CHANGE UP (ref 27–36.3)
AST SERPL-CCNC: 19 U/L — SIGNIFICANT CHANGE UP (ref 4–40)
BASOPHILS # BLD AUTO: 0.05 K/UL — SIGNIFICANT CHANGE UP (ref 0–0.2)
BASOPHILS NFR BLD AUTO: 0.7 % — SIGNIFICANT CHANGE UP (ref 0–2)
BILIRUB SERPL-MCNC: 0.3 MG/DL — SIGNIFICANT CHANGE UP (ref 0.2–1.2)
BLOOD GAS VENOUS COMPREHENSIVE RESULT: SIGNIFICANT CHANGE UP
BUN SERPL-MCNC: 38 MG/DL — HIGH (ref 7–23)
CALCIUM SERPL-MCNC: 9.2 MG/DL — SIGNIFICANT CHANGE UP (ref 8.4–10.5)
CHLORIDE SERPL-SCNC: 106 MMOL/L — SIGNIFICANT CHANGE UP (ref 98–107)
CK MB BLD-MCNC: 2.1 % — SIGNIFICANT CHANGE UP (ref 0–2.5)
CK MB CFR SERPL CALC: 3.2 NG/ML — SIGNIFICANT CHANGE UP
CK SERPL-CCNC: 152 U/L — SIGNIFICANT CHANGE UP (ref 30–200)
CO2 SERPL-SCNC: 22 MMOL/L — SIGNIFICANT CHANGE UP (ref 22–31)
CREAT SERPL-MCNC: 1.82 MG/DL — HIGH (ref 0.5–1.3)
EGFR: 35 ML/MIN/1.73M2 — LOW
EOSINOPHIL # BLD AUTO: 0.21 K/UL — SIGNIFICANT CHANGE UP (ref 0–0.5)
EOSINOPHIL NFR BLD AUTO: 2.7 % — SIGNIFICANT CHANGE UP (ref 0–6)
GLUCOSE BLDC GLUCOMTR-MCNC: 109 MG/DL — HIGH (ref 70–99)
GLUCOSE BLDC GLUCOMTR-MCNC: 130 MG/DL — HIGH (ref 70–99)
GLUCOSE BLDC GLUCOMTR-MCNC: 162 MG/DL — HIGH (ref 70–99)
GLUCOSE BLDC GLUCOMTR-MCNC: 238 MG/DL — HIGH (ref 70–99)
GLUCOSE SERPL-MCNC: 163 MG/DL — HIGH (ref 70–99)
HCT VFR BLD CALC: 35.8 % — LOW (ref 39–50)
HGB BLD-MCNC: 11.4 G/DL — LOW (ref 13–17)
IANC: 4.92 K/UL — SIGNIFICANT CHANGE UP (ref 1.8–7.4)
IMM GRANULOCYTES NFR BLD AUTO: 0.4 % — SIGNIFICANT CHANGE UP (ref 0–1.5)
INR BLD: 1.15 RATIO — SIGNIFICANT CHANGE UP (ref 0.88–1.16)
LYMPHOCYTES # BLD AUTO: 1.71 K/UL — SIGNIFICANT CHANGE UP (ref 1–3.3)
LYMPHOCYTES # BLD AUTO: 22.2 % — SIGNIFICANT CHANGE UP (ref 13–44)
MAGNESIUM SERPL-MCNC: 2.3 MG/DL — SIGNIFICANT CHANGE UP (ref 1.6–2.6)
MCHC RBC-ENTMCNC: 30 PG — SIGNIFICANT CHANGE UP (ref 27–34)
MCHC RBC-ENTMCNC: 31.8 GM/DL — LOW (ref 32–36)
MCV RBC AUTO: 94.2 FL — SIGNIFICANT CHANGE UP (ref 80–100)
MONOCYTES # BLD AUTO: 0.77 K/UL — SIGNIFICANT CHANGE UP (ref 0–0.9)
MONOCYTES NFR BLD AUTO: 10 % — SIGNIFICANT CHANGE UP (ref 2–14)
NEUTROPHILS # BLD AUTO: 4.92 K/UL — SIGNIFICANT CHANGE UP (ref 1.8–7.4)
NEUTROPHILS NFR BLD AUTO: 64 % — SIGNIFICANT CHANGE UP (ref 43–77)
NRBC # BLD: 0 /100 WBCS — SIGNIFICANT CHANGE UP
NRBC # FLD: 0 K/UL — SIGNIFICANT CHANGE UP
NT-PROBNP SERPL-SCNC: 698 PG/ML — HIGH
PLATELET # BLD AUTO: 222 K/UL — SIGNIFICANT CHANGE UP (ref 150–400)
POTASSIUM SERPL-MCNC: 4.8 MMOL/L — SIGNIFICANT CHANGE UP (ref 3.5–5.3)
POTASSIUM SERPL-SCNC: 4.8 MMOL/L — SIGNIFICANT CHANGE UP (ref 3.5–5.3)
PROT SERPL-MCNC: 6.7 G/DL — SIGNIFICANT CHANGE UP (ref 6–8.3)
PROTHROM AB SERPL-ACNC: 13.4 SEC — SIGNIFICANT CHANGE UP (ref 10.5–13.4)
RBC # BLD: 3.8 M/UL — LOW (ref 4.2–5.8)
RBC # FLD: 13.6 % — SIGNIFICANT CHANGE UP (ref 10.3–14.5)
SARS-COV-2 RNA SPEC QL NAA+PROBE: SIGNIFICANT CHANGE UP
SODIUM SERPL-SCNC: 139 MMOL/L — SIGNIFICANT CHANGE UP (ref 135–145)
TROPONIN T, HIGH SENSITIVITY RESULT: 41 NG/L — SIGNIFICANT CHANGE UP
TROPONIN T, HIGH SENSITIVITY RESULT: 41 NG/L — SIGNIFICANT CHANGE UP
TROPONIN T, HIGH SENSITIVITY RESULT: 42 NG/L — SIGNIFICANT CHANGE UP
WBC # BLD: 7.69 K/UL — SIGNIFICANT CHANGE UP (ref 3.8–10.5)
WBC # FLD AUTO: 7.69 K/UL — SIGNIFICANT CHANGE UP (ref 3.8–10.5)

## 2022-05-22 PROCEDURE — 99223 1ST HOSP IP/OBS HIGH 75: CPT

## 2022-05-22 PROCEDURE — 71045 X-RAY EXAM CHEST 1 VIEW: CPT | Mod: 26

## 2022-05-22 RX ORDER — ROSUVASTATIN CALCIUM 5 MG/1
40 TABLET ORAL AT BEDTIME
Refills: 0 | Status: DISCONTINUED | OUTPATIENT
Start: 2022-05-22 | End: 2022-05-27

## 2022-05-22 RX ORDER — DEXTROSE 50 % IN WATER 50 %
15 SYRINGE (ML) INTRAVENOUS ONCE
Refills: 0 | Status: DISCONTINUED | OUTPATIENT
Start: 2022-05-22 | End: 2022-05-27

## 2022-05-22 RX ORDER — FUROSEMIDE 40 MG
40 TABLET ORAL ONCE
Refills: 0 | Status: COMPLETED | OUTPATIENT
Start: 2022-05-22 | End: 2022-05-22

## 2022-05-22 RX ORDER — DEXTROSE 50 % IN WATER 50 %
12.5 SYRINGE (ML) INTRAVENOUS ONCE
Refills: 0 | Status: DISCONTINUED | OUTPATIENT
Start: 2022-05-22 | End: 2022-05-27

## 2022-05-22 RX ORDER — ASPIRIN/CALCIUM CARB/MAGNESIUM 324 MG
243 TABLET ORAL ONCE
Refills: 0 | Status: COMPLETED | OUTPATIENT
Start: 2022-05-22 | End: 2022-05-22

## 2022-05-22 RX ORDER — INSULIN LISPRO 100/ML
VIAL (ML) SUBCUTANEOUS
Refills: 0 | Status: DISCONTINUED | OUTPATIENT
Start: 2022-05-22 | End: 2022-05-27

## 2022-05-22 RX ORDER — DEXTROSE 50 % IN WATER 50 %
25 SYRINGE (ML) INTRAVENOUS ONCE
Refills: 0 | Status: DISCONTINUED | OUTPATIENT
Start: 2022-05-22 | End: 2022-05-27

## 2022-05-22 RX ORDER — INSULIN LISPRO 100/ML
VIAL (ML) SUBCUTANEOUS AT BEDTIME
Refills: 0 | Status: DISCONTINUED | OUTPATIENT
Start: 2022-05-22 | End: 2022-05-27

## 2022-05-22 RX ORDER — ESCITALOPRAM OXALATE 10 MG/1
10 TABLET, FILM COATED ORAL DAILY
Refills: 0 | Status: DISCONTINUED | OUTPATIENT
Start: 2022-05-22 | End: 2022-05-27

## 2022-05-22 RX ORDER — GLUCAGON INJECTION, SOLUTION 0.5 MG/.1ML
1 INJECTION, SOLUTION SUBCUTANEOUS ONCE
Refills: 0 | Status: DISCONTINUED | OUTPATIENT
Start: 2022-05-22 | End: 2022-05-27

## 2022-05-22 RX ORDER — CHOLECALCIFEROL (VITAMIN D3) 125 MCG
1000 CAPSULE ORAL DAILY
Refills: 0 | Status: DISCONTINUED | OUTPATIENT
Start: 2022-05-22 | End: 2022-05-27

## 2022-05-22 RX ORDER — HEPARIN SODIUM 5000 [USP'U]/ML
5000 INJECTION INTRAVENOUS; SUBCUTANEOUS EVERY 8 HOURS
Refills: 0 | Status: DISCONTINUED | OUTPATIENT
Start: 2022-05-22 | End: 2022-05-27

## 2022-05-22 RX ORDER — SODIUM CHLORIDE 9 MG/ML
1000 INJECTION, SOLUTION INTRAVENOUS
Refills: 0 | Status: DISCONTINUED | OUTPATIENT
Start: 2022-05-22 | End: 2022-05-27

## 2022-05-22 RX ORDER — CARVEDILOL PHOSPHATE 80 MG/1
6.25 CAPSULE, EXTENDED RELEASE ORAL EVERY 12 HOURS
Refills: 0 | Status: DISCONTINUED | OUTPATIENT
Start: 2022-05-22 | End: 2022-05-27

## 2022-05-22 RX ORDER — PANTOPRAZOLE SODIUM 20 MG/1
40 TABLET, DELAYED RELEASE ORAL
Refills: 0 | Status: DISCONTINUED | OUTPATIENT
Start: 2022-05-22 | End: 2022-05-27

## 2022-05-22 RX ORDER — FUROSEMIDE 40 MG
40 TABLET ORAL DAILY
Refills: 0 | Status: DISCONTINUED | OUTPATIENT
Start: 2022-05-23 | End: 2022-05-25

## 2022-05-22 RX ORDER — POLYETHYLENE GLYCOL 3350 17 G/17G
17 POWDER, FOR SOLUTION ORAL DAILY
Refills: 0 | Status: DISCONTINUED | OUTPATIENT
Start: 2022-05-22 | End: 2022-05-27

## 2022-05-22 RX ORDER — TICAGRELOR 90 MG/1
90 TABLET ORAL
Refills: 0 | Status: DISCONTINUED | OUTPATIENT
Start: 2022-05-22 | End: 2022-05-27

## 2022-05-22 RX ORDER — INSULIN GLARGINE 100 [IU]/ML
10 INJECTION, SOLUTION SUBCUTANEOUS AT BEDTIME
Refills: 0 | Status: DISCONTINUED | OUTPATIENT
Start: 2022-05-22 | End: 2022-05-27

## 2022-05-22 RX ORDER — ISOSORBIDE DINITRATE 5 MG/1
10 TABLET ORAL THREE TIMES A DAY
Refills: 0 | Status: DISCONTINUED | OUTPATIENT
Start: 2022-05-22 | End: 2022-05-27

## 2022-05-22 RX ORDER — ASPIRIN/CALCIUM CARB/MAGNESIUM 324 MG
81 TABLET ORAL DAILY
Refills: 0 | Status: DISCONTINUED | OUTPATIENT
Start: 2022-05-22 | End: 2022-05-27

## 2022-05-22 RX ORDER — HYDRALAZINE HCL 50 MG
10 TABLET ORAL THREE TIMES A DAY
Refills: 0 | Status: DISCONTINUED | OUTPATIENT
Start: 2022-05-22 | End: 2022-05-27

## 2022-05-22 RX ORDER — GABAPENTIN 400 MG/1
400 CAPSULE ORAL
Refills: 0 | Status: DISCONTINUED | OUTPATIENT
Start: 2022-05-22 | End: 2022-05-27

## 2022-05-22 RX ADMIN — Medication 10 MILLIGRAM(S): at 21:31

## 2022-05-22 RX ADMIN — INSULIN GLARGINE 10 UNIT(S): 100 INJECTION, SOLUTION SUBCUTANEOUS at 21:31

## 2022-05-22 RX ADMIN — TICAGRELOR 90 MILLIGRAM(S): 90 TABLET ORAL at 09:52

## 2022-05-22 RX ADMIN — ISOSORBIDE DINITRATE 10 MILLIGRAM(S): 5 TABLET ORAL at 12:54

## 2022-05-22 RX ADMIN — Medication 40 MILLIGRAM(S): at 03:00

## 2022-05-22 RX ADMIN — PANTOPRAZOLE SODIUM 40 MILLIGRAM(S): 20 TABLET, DELAYED RELEASE ORAL at 09:54

## 2022-05-22 RX ADMIN — ISOSORBIDE DINITRATE 10 MILLIGRAM(S): 5 TABLET ORAL at 21:31

## 2022-05-22 RX ADMIN — CARVEDILOL PHOSPHATE 6.25 MILLIGRAM(S): 80 CAPSULE, EXTENDED RELEASE ORAL at 17:34

## 2022-05-22 RX ADMIN — HEPARIN SODIUM 5000 UNIT(S): 5000 INJECTION INTRAVENOUS; SUBCUTANEOUS at 13:03

## 2022-05-22 RX ADMIN — GABAPENTIN 400 MILLIGRAM(S): 400 CAPSULE ORAL at 17:34

## 2022-05-22 RX ADMIN — Medication 10 MILLIGRAM(S): at 13:03

## 2022-05-22 RX ADMIN — Medication 81 MILLIGRAM(S): at 12:54

## 2022-05-22 RX ADMIN — ROSUVASTATIN CALCIUM 40 MILLIGRAM(S): 5 TABLET ORAL at 21:31

## 2022-05-22 RX ADMIN — Medication 2: at 17:33

## 2022-05-22 RX ADMIN — TICAGRELOR 90 MILLIGRAM(S): 90 TABLET ORAL at 17:34

## 2022-05-22 RX ADMIN — Medication 243 MILLIGRAM(S): at 01:12

## 2022-05-22 RX ADMIN — Medication 1000 UNIT(S): at 12:54

## 2022-05-22 RX ADMIN — POLYETHYLENE GLYCOL 3350 17 GRAM(S): 17 POWDER, FOR SOLUTION ORAL at 12:52

## 2022-05-22 RX ADMIN — HEPARIN SODIUM 5000 UNIT(S): 5000 INJECTION INTRAVENOUS; SUBCUTANEOUS at 21:31

## 2022-05-22 RX ADMIN — ESCITALOPRAM OXALATE 10 MILLIGRAM(S): 10 TABLET, FILM COATED ORAL at 12:53

## 2022-05-22 NOTE — H&P ADULT - PROBLEM SELECTOR PLAN 7
Has RLE stent. Continue statin.      DVT ppx- On HSQ.    Communication  -Spoke w/ son at bedside. Updated on POC.  -Message sent to Dr Cagle to further discuss.

## 2022-05-22 NOTE — H&P ADULT - PROBLEM SELECTOR PLAN 5
-NPO currently pending decision re: further diagnostics.  -FS q6h.  -Reassess basal insulin pending course. Hold home PO meds.  -ISS -Monitor FS.  -Check A1c.  -FS TIDACHS.  -Cath tomorrow, will be NPO.  -Start lantus 10 qHS. Can inc to therapeutic equivalent home dose once resuming PO tomorrow.  -Cont ISS

## 2022-05-22 NOTE — ED PROVIDER NOTE - PHYSICAL EXAMINATION
Gen: NAD, non-toxic appearing  Head: normal appearing  HEENT: normal conjunctiva, oral mucosa moist  Lung: no respiratory distress, speaking in full sentences, rhonchi b/l  CV: regular rate and rhythm, no murmurs, 1+ pitting edema  Abd: soft, non distended, non tender   MSK: no visible deformities  Neuro: No focal deficits, AAOx3  Skin: Warm  Psych: normal affect

## 2022-05-22 NOTE — H&P ADULT - NSHPPHYSICALEXAM_GEN_ALL_CORE
Vital Signs Last 24 Hrs  T(C): 35.6 (22 May 2022 08:58), Max: 37.2 (21 May 2022 23:59)  T(F): 96 (22 May 2022 08:58), Max: 99 (21 May 2022 23:59)  HR: 70 (22 May 2022 08:58) (64 - 88)  BP: 146/74 (22 May 2022 08:58) (120/43 - 146/74)  BP(mean): --  RR: 16 (22 May 2022 08:58) (16 - 18)  SpO2: 100% (22 May 2022 08:58) (98% - 100%)    Gen- In bed, comfortable, NAD  Eyes- EOMI, PERRLA, nonicteric.  EMNT- Fair dentition. Dry MM. No tonsilar exudates. No posterior pharynx erythema.  Neck- Supple. No masses. No tracheal deviation.  Resp- Some crackles at bases. Otherwise, clear, good effort. No accessory muscle use.  CVS- RRR, S1S2, no g/r/m. No LE edema.  GI- Soft abd, NT, ND, +BSx4. No HSM.  MSK- No C/C. ROM intact. No crepitus.  Neuro- CN II-XII intact. Speech fluent/face symmetric. Sensation intact.  Skin- No rashes/ulcers. Warm/moist.  Psych- AAOx3. Appropriate mood/affect.

## 2022-05-22 NOTE — H&P ADULT - HISTORY OF PRESENT ILLNESS
88M with PMH of CAD s/p CABG, s/p stents, recent STEMI (3/2022), HTN, DM2, CKD, PVD s/p RLE stent who presents to the hospital with SOB x4 days. States his SOB has gradually worsened. He has associated left-sided/substernal chest pressure w/o radiation. He does report noticing a "nerve pain" of his RUE, which is new. His baseline exercise tolerance is 100 yd and in recent days, he can barely ambulate 10 feet. He denies orthopnea, but does report PND. He states he has been adherent w/ all of his medications. He reports being adherent to an an appropriate heart diet. Since arriving and receiving lasix, he reports improvement of his symptoms. His SOB has resolved. However, he continues to notice a slight substernal pressure and right UE pain. No recent illnesses, no SC, no NV, abd pain, diarrhea, dysuria. He has voided a good amount since receiving IV lasix.    ED Course: 120/43, 85, 18, 99F, 98% RA. Received lasix, ASA

## 2022-05-22 NOTE — H&P ADULT - ASSESSMENT
88M with PMH of CAD s/p CABG, s/p stents, recent STEMI (3/2022), HTN, DM2, CKD, PVD s/p RLE stent who presents to the hospital with SOB x4 days. Now complaining of chest pressure. Hx and exam suggestive of possible CHF exacerbation. Chest is concerning in light of complicated cardiac hx/recent MI/and risk factors. Admit for further evaluation.

## 2022-05-22 NOTE — H&P ADULT - PROBLEM SELECTOR PLAN 1
Volume status appears to have improve. SOB improved.  -Continue IV lasix 40 daily.  -Continue carvedilol, isosorbide, hydralazine.  -Reassess ARB pending decision re: need to cath considering CKD hx.  -Strict IO, daily wts.   -Cardio for further recs.

## 2022-05-22 NOTE — H&P ADULT - PROBLEM SELECTOR PLAN 2
Continues to have substernal chest pressure. Trops flat. Elevation could be 2' advanced CKD. However, continues to have sx.  -Keep NPO for now. Await cardio recs re: need to pursue cath.  -Continue ASA, Brilinta, Crestor.  -Repeat EKG is progress to eval for dynamic changes.  -Monitor on telemetry.

## 2022-05-22 NOTE — H&P ADULT - NSHPLABSRESULTS_GEN_ALL_CORE
11.4   7.69  )-----------( 222      ( 22 May 2022 01:00 )             35.8     05-22    139  |  106  |  38<H>  ----------------------------<  163<H>  4.8   |  22  |  1.82<H>    Ca    9.2      22 May 2022 01:00  Mg     2.30     05-22    TPro  6.7  /  Alb  3.9  /  TBili  0.3  /  DBili  x   /  AST  19  /  ALT  16  /  AlkPhos  66  05-22    PT/INR - ( 22 May 2022 01:00 )   PT: 13.4 sec;   INR: 1.15 ratio         PTT - ( 22 May 2022 01:00 )  PTT:30.9 sec    CARDIAC MARKERS ( 22 May 2022 01:00 )  x     / x     / 152 U/L / x     / 3.2 ng/mL    Trop --> 42-->41    COVID-19 PCR: NotDetec (05 Mar 2022 14:24)  COVID-19 PCR: NotDetec (02 Mar 2022 23:18)    Serum Pro-Brain Natriuretic Peptide: 698 pg/mL (05-22 @ 01:00)      CXR - personally reviewed/interpreted. Grossly clear.    EKG  - personally reviewed/interpreted. Similar appearing STD as seen in March EKG.

## 2022-05-22 NOTE — ED PROVIDER NOTE - CLINICAL SUMMARY MEDICAL DECISION MAKING FREE TEXT BOX
87y/o M w/ h/o CAD s/p CABG (1999), PCI stents x 5 (2019), PVD with stent place to Right Posterior Tibial Artery? (2021), T2DM, HTN, HLD, CKD p/w 4d of KING, orthopnea. EKG subtle STD in lateral leads. Plan labs, cardiac enzymes, cxr, diuretics, asa and admission Nik att: 89 y/o M w/ h/o CAD s/p CABG (1999), PCI stents x 5 (2019), PVD with stent place to Right Posterior Tibial Artery? (2021), T2DM, HTN, HLD, CKD p/w 4d of KING, orthopnea. EKG subtle STD in lateral leads. Plan labs, cardiac enzymes, cxr, diuretics, asa and admission

## 2022-05-22 NOTE — ED PROVIDER NOTE - OBJECTIVE STATEMENT
87y/o M w/ h/o CAD s/p CABG (1999), PCI stents x 5 (2019), PVD with stent place to Right Posterior Tibial Artery? (2021), T2DM, HTN, HLD, CKD p/w 4d of KING, orthopnea. Has been having difficulty performing ADLs without SOB. Denies fevers, chills, nausea, vomiting, palpitations, cough, chest pain, abdominal pain, back pain, dysuria, numbness, tingling, recent surgeries, travel history, calf pain, FHx heart disease.

## 2022-05-22 NOTE — ED PROVIDER NOTE - EMPLOYMENT
Pt states that for over a week she has a small amount of blood on the toilet paper every time she wipes.  Pt states that she does not know if this is from her vagina or not.  Pt denies blood in her stool.  Order entered for a [facility].   N/A

## 2022-05-22 NOTE — ED ADULT NURSE NOTE - NSIMPLEMENTINTERV_GEN_ALL_ED
Implemented All Fall with Harm Risk Interventions:  [place] to call system. Call bell, personal items and telephone within reach. Instruct patient to call for assistance. Room bathroom lighting operational. Non-slip footwear when patient is off stretcher. Physically safe environment: no spills, clutter or unnecessary equipment. Stretcher in lowest position, wheels locked, appropriate side rails in place. Provide visual cue, wrist band, yellow gown, etc. Monitor gait and stability. Monitor for mental status changes and reorient to person, place, and time. Review medications for side effects contributing to fall risk. Reinforce activity limits and safety measures with patient and family. Provide visual clues: red socks.

## 2022-05-22 NOTE — ED ADULT NURSE NOTE - OBJECTIVE STATEMENT
Pt A&Ox4, ambulatory. PT in NAD, resp equal and unlabored. Pmhx: Cabgx4, stent 2019, DM, HTN, MI on ASA, stroke. Pt arrives x1 day worth of SOB upon exertion, chest heaviness and R arm pain. Pt appears comfortable denies SOB at this time, dizziness, syncope, diaphoresis, anxiety. 20G to L wrist. PT placed on cardiac monitor.

## 2022-05-23 LAB
A1C WITH ESTIMATED AVERAGE GLUCOSE RESULT: 7.5 % — HIGH (ref 4–5.6)
ANION GAP SERPL CALC-SCNC: 11 MMOL/L — SIGNIFICANT CHANGE UP (ref 7–14)
BUN SERPL-MCNC: 40 MG/DL — HIGH (ref 7–23)
CALCIUM SERPL-MCNC: 9.2 MG/DL — SIGNIFICANT CHANGE UP (ref 8.4–10.5)
CHLORIDE SERPL-SCNC: 99 MMOL/L — SIGNIFICANT CHANGE UP (ref 98–107)
CO2 SERPL-SCNC: 25 MMOL/L — SIGNIFICANT CHANGE UP (ref 22–31)
CREAT SERPL-MCNC: 1.84 MG/DL — HIGH (ref 0.5–1.3)
EGFR: 35 ML/MIN/1.73M2 — LOW
ESTIMATED AVERAGE GLUCOSE: 169 — SIGNIFICANT CHANGE UP
GLUCOSE BLDC GLUCOMTR-MCNC: 116 MG/DL — HIGH (ref 70–99)
GLUCOSE BLDC GLUCOMTR-MCNC: 139 MG/DL — HIGH (ref 70–99)
GLUCOSE BLDC GLUCOMTR-MCNC: 140 MG/DL — HIGH (ref 70–99)
GLUCOSE BLDC GLUCOMTR-MCNC: 150 MG/DL — HIGH (ref 70–99)
GLUCOSE BLDC GLUCOMTR-MCNC: 222 MG/DL — HIGH (ref 70–99)
GLUCOSE SERPL-MCNC: 151 MG/DL — HIGH (ref 70–99)
HCT VFR BLD CALC: 40.8 % — SIGNIFICANT CHANGE UP (ref 39–50)
HGB BLD-MCNC: 13.2 G/DL — SIGNIFICANT CHANGE UP (ref 13–17)
MCHC RBC-ENTMCNC: 29.9 PG — SIGNIFICANT CHANGE UP (ref 27–34)
MCHC RBC-ENTMCNC: 32.4 GM/DL — SIGNIFICANT CHANGE UP (ref 32–36)
MCV RBC AUTO: 92.5 FL — SIGNIFICANT CHANGE UP (ref 80–100)
NRBC # BLD: 0 /100 WBCS — SIGNIFICANT CHANGE UP
NRBC # FLD: 0 K/UL — SIGNIFICANT CHANGE UP
PLATELET # BLD AUTO: 245 K/UL — SIGNIFICANT CHANGE UP (ref 150–400)
POTASSIUM SERPL-MCNC: 4.1 MMOL/L — SIGNIFICANT CHANGE UP (ref 3.5–5.3)
POTASSIUM SERPL-SCNC: 4.1 MMOL/L — SIGNIFICANT CHANGE UP (ref 3.5–5.3)
RBC # BLD: 4.41 M/UL — SIGNIFICANT CHANGE UP (ref 4.2–5.8)
RBC # FLD: 13.4 % — SIGNIFICANT CHANGE UP (ref 10.3–14.5)
SODIUM SERPL-SCNC: 135 MMOL/L — SIGNIFICANT CHANGE UP (ref 135–145)
TSH SERPL-MCNC: 2.41 UIU/ML — SIGNIFICANT CHANGE UP (ref 0.27–4.2)
WBC # BLD: 9.29 K/UL — SIGNIFICANT CHANGE UP (ref 3.8–10.5)
WBC # FLD AUTO: 9.29 K/UL — SIGNIFICANT CHANGE UP (ref 3.8–10.5)

## 2022-05-23 PROCEDURE — 93306 TTE W/DOPPLER COMPLETE: CPT | Mod: 26

## 2022-05-23 RX ADMIN — Medication 1000 UNIT(S): at 13:33

## 2022-05-23 RX ADMIN — CARVEDILOL PHOSPHATE 6.25 MILLIGRAM(S): 80 CAPSULE, EXTENDED RELEASE ORAL at 17:28

## 2022-05-23 RX ADMIN — Medication 10 MILLIGRAM(S): at 13:33

## 2022-05-23 RX ADMIN — ISOSORBIDE DINITRATE 10 MILLIGRAM(S): 5 TABLET ORAL at 05:33

## 2022-05-23 RX ADMIN — POLYETHYLENE GLYCOL 3350 17 GRAM(S): 17 POWDER, FOR SOLUTION ORAL at 13:33

## 2022-05-23 RX ADMIN — Medication 4: at 18:14

## 2022-05-23 RX ADMIN — TICAGRELOR 90 MILLIGRAM(S): 90 TABLET ORAL at 17:29

## 2022-05-23 RX ADMIN — TICAGRELOR 90 MILLIGRAM(S): 90 TABLET ORAL at 05:33

## 2022-05-23 RX ADMIN — Medication 10 MILLIGRAM(S): at 05:32

## 2022-05-23 RX ADMIN — Medication 10 MILLIGRAM(S): at 22:05

## 2022-05-23 RX ADMIN — GABAPENTIN 400 MILLIGRAM(S): 400 CAPSULE ORAL at 05:33

## 2022-05-23 RX ADMIN — GABAPENTIN 400 MILLIGRAM(S): 400 CAPSULE ORAL at 17:29

## 2022-05-23 RX ADMIN — CARVEDILOL PHOSPHATE 6.25 MILLIGRAM(S): 80 CAPSULE, EXTENDED RELEASE ORAL at 05:32

## 2022-05-23 RX ADMIN — Medication 81 MILLIGRAM(S): at 13:33

## 2022-05-23 RX ADMIN — ESCITALOPRAM OXALATE 10 MILLIGRAM(S): 10 TABLET, FILM COATED ORAL at 13:33

## 2022-05-23 RX ADMIN — Medication 40 MILLIGRAM(S): at 05:33

## 2022-05-23 RX ADMIN — HEPARIN SODIUM 5000 UNIT(S): 5000 INJECTION INTRAVENOUS; SUBCUTANEOUS at 22:07

## 2022-05-23 RX ADMIN — INSULIN GLARGINE 10 UNIT(S): 100 INJECTION, SOLUTION SUBCUTANEOUS at 21:17

## 2022-05-23 RX ADMIN — ISOSORBIDE DINITRATE 10 MILLIGRAM(S): 5 TABLET ORAL at 13:33

## 2022-05-23 RX ADMIN — ROSUVASTATIN CALCIUM 40 MILLIGRAM(S): 5 TABLET ORAL at 22:04

## 2022-05-23 RX ADMIN — HEPARIN SODIUM 5000 UNIT(S): 5000 INJECTION INTRAVENOUS; SUBCUTANEOUS at 13:33

## 2022-05-23 RX ADMIN — ISOSORBIDE DINITRATE 10 MILLIGRAM(S): 5 TABLET ORAL at 22:04

## 2022-05-23 NOTE — PATIENT PROFILE ADULT - FALL HARM RISK - HARM RISK INTERVENTIONS

## 2022-05-23 NOTE — PHYSICAL THERAPY INITIAL EVALUATION ADULT - PERTINENT HX OF CURRENT PROBLEM, REHAB EVAL
Patient is 88 year old M with PMH of CAD s/p CABG, s/p stents, recent STEMI (3/2022), HTN, DM2, CKD, PVD s/p RLE stent who presents to the hospital with SOB x4 day

## 2022-05-23 NOTE — PATIENT PROFILE ADULT - FUNCTIONAL ASSESSMENT - DAILY ACTIVITY 3.
A mass was noted on your left kidney. The radiologist is recommended a outpatient MRI with contrast to further evaluate. This can be ordered by your primary care doctor.    Also, a cyst was noted on your pancrease. Current guidelines recommend CT or MRI/MRCP every 6 months for 1 year and then yearly for 2 years and then every 2 years if no change. This can be ordered by your primary care doctor    You should follow up with your urologist in 1-2 weeks.    
4 = No assist / stand by assistance

## 2022-05-23 NOTE — PROGRESS NOTE ADULT - SUBJECTIVE AND OBJECTIVE BOX
PATIENT SEEN AND EXAMINED ON :- 5/23/22  DATE OF SERVICE:  5/23/22           Interim events noted,Labs ,Radiological studies and Cardiology tests reviewed .     HOSPITAL COURSE: HPI:  88M with PMH of CAD s/p CABG, s/p stents, recent STEMI (3/2022), HTN, DM2, CKD, PVD s/p RLE stent who presents to the hospital with SOB x4 days. States his SOB has gradually worsened. He has associated left-sided/substernal chest pressure w/o radiation. He does report noticing a "nerve pain" of his RUE, which is new. His baseline exercise tolerance is 100 yd and in recent days, he can barely ambulate 10 feet. He denies orthopnea, but does report PND. He states he has been adherent w/ all of his medications. He reports being adherent to an an appropriate heart diet. Since arriving and receiving lasix, he reports improvement of his symptoms. His SOB has resolved. However, he continues to notice a slight substernal pressure and right UE pain. No recent illnesses, no SC, no NV, abd pain, diarrhea, dysuria. He has voided a good amount since receiving IV lasix.    ED Course: 120/43, 85, 18, 99F, 98% RA. Received lasix, ASA (22 May 2022 09:32)      INTERIM EVENTS:Patient seen at bedside ,interim events noted.      PMH -reviewed admission note, no change since admission  HEART FAILURE: Acute[ ]Chronic[ ] Systolic[ ] Diastolic[ ] Combined Systolic and Diastolic[ ]  CAD[ ] CABG[ ] PCI[ ]  DEVICES[ ] PPM[ ] ICD[ ] ILR[ ]  ATRIAL FIBRILLATION[ ] Paroxysmal[ ] Permanent[ ]  MABLE[ ] CKD1[ ] CKD2[ ] CKD3[ ] CKD4[ ] ESRD[ ]  COPD[ ] HTN[ ]   DM[ ] Type1[ ] Type 2[ ]   CVA[ ] Paresis[ ]    AMBULATION: Assisted[ ] Cane/walker[ ] Independent[ ]    MEDICATIONS  (STANDING):  aspirin enteric coated 81 milliGRAM(s) Oral daily  carvedilol 6.25 milliGRAM(s) Oral every 12 hours  cholecalciferol 1000 Unit(s) Oral daily  dextrose 5%. 1000 milliLiter(s) (50 mL/Hr) IV Continuous <Continuous>  dextrose 5%. 1000 milliLiter(s) (100 mL/Hr) IV Continuous <Continuous>  dextrose 50% Injectable 12.5 Gram(s) IV Push once  dextrose 50% Injectable 25 Gram(s) IV Push once  dextrose 50% Injectable 25 Gram(s) IV Push once  escitalopram 10 milliGRAM(s) Oral daily  furosemide   Injectable 40 milliGRAM(s) IV Push daily  gabapentin 400 milliGRAM(s) Oral two times a day  glucagon  Injectable 1 milliGRAM(s) IntraMuscular once  heparin   Injectable 5000 Unit(s) SubCutaneous every 8 hours  hydrALAZINE 10 milliGRAM(s) Oral three times a day  insulin glargine Injectable (LANTUS) 10 Unit(s) SubCutaneous at bedtime  insulin lispro (ADMELOG) corrective regimen sliding scale   SubCutaneous three times a day before meals  insulin lispro (ADMELOG) corrective regimen sliding scale   SubCutaneous at bedtime  isosorbide   dinitrate Tablet (ISORDIL) 10 milliGRAM(s) Oral three times a day  pantoprazole    Tablet 40 milliGRAM(s) Oral before breakfast  polyethylene glycol 3350 17 Gram(s) Oral daily  rosuvastatin 40 milliGRAM(s) Oral at bedtime  ticagrelor 90 milliGRAM(s) Oral two times a day    MEDICATIONS  (PRN):  dextrose Oral Gel 15 Gram(s) Oral once PRN Blood Glucose LESS THAN 70 milliGRAM(s)/deciliter            REVIEW OF SYSTEMS:  Constitutional: [ ] fever, [ ]weight loss,  [ ]fatigue  Eyes: [ ] visual changes  Respiratory: [ ]shortness of breath;  [ ] cough, [ ]wheezing, [ ]chills, [ ]hemoptysis  Cardiovascular: [ ] chest pain, [ ]palpitations, [ ]dizziness,  [ ]leg swelling[ ]orthopnea[ ]PND  Gastrointestinal: [ ] abdominal pain, [ ]nausea, [ ]vomiting,  [ ]diarrhea [ ]Constipation [ ]Melena  Genitourinary: [ ] dysuria, [ ] hematuria [ ][place]  Neurologic: [ ] headaches [ ] tremors[ ]weakness [ ]Paralysis Right[ ] Left[ ]  Skin: [ ] itching, [ ]burning, [ ] rashes  Endocrine: [ ] heat or cold intolerance  Musculoskeletal: [ ] joint pain or swelling; [ ] muscle, back, or extremity pain  Psychiatric: [ ] depression, [ ]anxiety, [ ]mood swings, or [ ]difficulty sleeping  Hematologic: [ ] easy bruising, [ ] bleeding gums    [ ] All remaining systems negative except as per above.   [ ]Unable to obtain.  [x] No change in ROS since admission      Vital Signs Last 24 Hrs  T(C): 36.6 (23 May 2022 17:00), Max: 36.7 (22 May 2022 21:15)  T(F): 97.8 (23 May 2022 17:00), Max: 98 (22 May 2022 21:15)  HR: 71 (23 May 2022 17:00) (70 - 99)  BP: 115/72 (23 May 2022 17:00) (115/72 - 160/67)  BP(mean): --  RR: 18 (23 May 2022 17:00) (18 - 18)  SpO2: 99% (23 May 2022 17:00) (98% - 100%)  I&O's Summary    23 May 2022 07:01  -  23 May 2022 20:01  --------------------------------------------------------  IN: 550 mL / OUT: 1150 mL / NET: -600 mL        PHYSICAL EXAM:  General: No acute distress BMI-  HEENT: EOMI, PERRL  Neck: Supple, [ ] JVD  Lungs: Equal air entry bilaterally; [ ] rales [ ] wheezing [ ] rhonchi  Heart: Regular rate and rhythm; [x ] murmur   2/6 [ x] systolic [ ] diastolic [ ] radiation[ ] rubs [ ]  gallops  Abdomen: Nontender, bowel sounds present  Extremities: No clubbing, cyanosis, [ ] edema [ ]Pulses  equal and intact  Nervous system:  Alert & Oriented X3, no focal deficits  Psychiatric: Normal affect  Skin: No rashes or lesions    LABS:  05-23    135  |  99  |  40<H>  ----------------------------<  151<H>  4.1   |  25  |  1.84<H>    Ca    9.2      23 May 2022 06:22  Mg     2.30     05-22    TPro  6.7  /  Alb  3.9  /  TBili  0.3  /  DBili  x   /  AST  19  /  ALT  16  /  AlkPhos  66  05-22    Creatinine Trend: 1.84<--, 1.82<--                        13.2   9.29  )-----------( 245      ( 23 May 2022 06:22 )             40.8     PT/INR - ( 22 May 2022 01:00 )   PT: 13.4 sec;   INR: 1.15 ratio         PTT - ( 22 May 2022 01:00 )  PTT:30.9 sec    Serum Pro-Brain Natriuretic Peptide: 698 pg/mL (05-22-22 @ 01:00)

## 2022-05-24 LAB
ANION GAP SERPL CALC-SCNC: 13 MMOL/L — SIGNIFICANT CHANGE UP (ref 7–14)
BUN SERPL-MCNC: 41 MG/DL — HIGH (ref 7–23)
CALCIUM SERPL-MCNC: 8.7 MG/DL — SIGNIFICANT CHANGE UP (ref 8.4–10.5)
CHLORIDE SERPL-SCNC: 101 MMOL/L — SIGNIFICANT CHANGE UP (ref 98–107)
CO2 SERPL-SCNC: 22 MMOL/L — SIGNIFICANT CHANGE UP (ref 22–31)
CREAT SERPL-MCNC: 1.92 MG/DL — HIGH (ref 0.5–1.3)
EGFR: 33 ML/MIN/1.73M2 — LOW
GLUCOSE BLDC GLUCOMTR-MCNC: 155 MG/DL — HIGH (ref 70–99)
GLUCOSE BLDC GLUCOMTR-MCNC: 267 MG/DL — HIGH (ref 70–99)
GLUCOSE BLDC GLUCOMTR-MCNC: 302 MG/DL — HIGH (ref 70–99)
GLUCOSE BLDC GLUCOMTR-MCNC: 76 MG/DL — SIGNIFICANT CHANGE UP (ref 70–99)
GLUCOSE SERPL-MCNC: 134 MG/DL — HIGH (ref 70–99)
HCT VFR BLD CALC: 39.8 % — SIGNIFICANT CHANGE UP (ref 39–50)
HGB BLD-MCNC: 13.1 G/DL — SIGNIFICANT CHANGE UP (ref 13–17)
MAGNESIUM SERPL-MCNC: 2.4 MG/DL — SIGNIFICANT CHANGE UP (ref 1.6–2.6)
MCHC RBC-ENTMCNC: 30 PG — SIGNIFICANT CHANGE UP (ref 27–34)
MCHC RBC-ENTMCNC: 32.9 GM/DL — SIGNIFICANT CHANGE UP (ref 32–36)
MCV RBC AUTO: 91.3 FL — SIGNIFICANT CHANGE UP (ref 80–100)
NRBC # BLD: 0 /100 WBCS — SIGNIFICANT CHANGE UP
NRBC # FLD: 0 K/UL — SIGNIFICANT CHANGE UP
NT-PROBNP SERPL-SCNC: 818 PG/ML — HIGH
PHOSPHATE SERPL-MCNC: 3.3 MG/DL — SIGNIFICANT CHANGE UP (ref 2.5–4.5)
PLATELET # BLD AUTO: 234 K/UL — SIGNIFICANT CHANGE UP (ref 150–400)
POTASSIUM SERPL-MCNC: 3.9 MMOL/L — SIGNIFICANT CHANGE UP (ref 3.5–5.3)
POTASSIUM SERPL-SCNC: 3.9 MMOL/L — SIGNIFICANT CHANGE UP (ref 3.5–5.3)
RBC # BLD: 4.36 M/UL — SIGNIFICANT CHANGE UP (ref 4.2–5.8)
RBC # FLD: 13.3 % — SIGNIFICANT CHANGE UP (ref 10.3–14.5)
SODIUM SERPL-SCNC: 136 MMOL/L — SIGNIFICANT CHANGE UP (ref 135–145)
WBC # BLD: 7.3 K/UL — SIGNIFICANT CHANGE UP (ref 3.8–10.5)
WBC # FLD AUTO: 7.3 K/UL — SIGNIFICANT CHANGE UP (ref 3.8–10.5)

## 2022-05-24 PROCEDURE — 93018 CV STRESS TEST I&R ONLY: CPT | Mod: GC

## 2022-05-24 PROCEDURE — 78452 HT MUSCLE IMAGE SPECT MULT: CPT | Mod: 26

## 2022-05-24 PROCEDURE — 93016 CV STRESS TEST SUPVJ ONLY: CPT | Mod: GC

## 2022-05-24 RX ADMIN — Medication 10 MILLIGRAM(S): at 05:29

## 2022-05-24 RX ADMIN — Medication 2: at 21:27

## 2022-05-24 RX ADMIN — ROSUVASTATIN CALCIUM 40 MILLIGRAM(S): 5 TABLET ORAL at 21:22

## 2022-05-24 RX ADMIN — HEPARIN SODIUM 5000 UNIT(S): 5000 INJECTION INTRAVENOUS; SUBCUTANEOUS at 05:30

## 2022-05-24 RX ADMIN — Medication 81 MILLIGRAM(S): at 12:53

## 2022-05-24 RX ADMIN — GABAPENTIN 400 MILLIGRAM(S): 400 CAPSULE ORAL at 05:27

## 2022-05-24 RX ADMIN — ISOSORBIDE DINITRATE 10 MILLIGRAM(S): 5 TABLET ORAL at 17:55

## 2022-05-24 RX ADMIN — ISOSORBIDE DINITRATE 10 MILLIGRAM(S): 5 TABLET ORAL at 05:31

## 2022-05-24 RX ADMIN — GABAPENTIN 400 MILLIGRAM(S): 400 CAPSULE ORAL at 17:56

## 2022-05-24 RX ADMIN — INSULIN GLARGINE 10 UNIT(S): 100 INJECTION, SOLUTION SUBCUTANEOUS at 21:23

## 2022-05-24 RX ADMIN — HEPARIN SODIUM 5000 UNIT(S): 5000 INJECTION INTRAVENOUS; SUBCUTANEOUS at 21:23

## 2022-05-24 RX ADMIN — CARVEDILOL PHOSPHATE 6.25 MILLIGRAM(S): 80 CAPSULE, EXTENDED RELEASE ORAL at 05:30

## 2022-05-24 RX ADMIN — TICAGRELOR 90 MILLIGRAM(S): 90 TABLET ORAL at 05:27

## 2022-05-24 RX ADMIN — Medication 40 MILLIGRAM(S): at 05:30

## 2022-05-24 RX ADMIN — Medication 1000 UNIT(S): at 12:53

## 2022-05-24 RX ADMIN — Medication 10 MILLIGRAM(S): at 21:22

## 2022-05-24 RX ADMIN — CARVEDILOL PHOSPHATE 6.25 MILLIGRAM(S): 80 CAPSULE, EXTENDED RELEASE ORAL at 17:55

## 2022-05-24 RX ADMIN — PANTOPRAZOLE SODIUM 40 MILLIGRAM(S): 20 TABLET, DELAYED RELEASE ORAL at 05:29

## 2022-05-24 RX ADMIN — ESCITALOPRAM OXALATE 10 MILLIGRAM(S): 10 TABLET, FILM COATED ORAL at 12:53

## 2022-05-24 RX ADMIN — Medication 8: at 08:51

## 2022-05-24 RX ADMIN — ISOSORBIDE DINITRATE 10 MILLIGRAM(S): 5 TABLET ORAL at 12:53

## 2022-05-24 RX ADMIN — TICAGRELOR 90 MILLIGRAM(S): 90 TABLET ORAL at 17:55

## 2022-05-24 NOTE — DIETITIAN INITIAL EVALUATION ADULT - PERSON TAUGHT/METHOD
verbal instruction/written material/teach back - (Patient repeats in own words)/patient instructed/son instructed

## 2022-05-24 NOTE — DIETITIAN INITIAL EVALUATION ADULT - ADD RECOMMEND
Discontinue Vegan per patient preference. Obtain daily weight and document PO intake to monitor trend.

## 2022-05-24 NOTE — DIETITIAN INITIAL EVALUATION ADULT - NS FNS DIET ORDER
Diet, DASH/TLC:   Sodium & Cholesterol Restricted  Consistent Carbohydrate {No Snacks} (CSTCHO)  Halal  Vegan {Accepts Vegetable Products Only}  No Caffeine  No Carbonated Beverages  No Chocolate (05-24-22 @ 09:50)

## 2022-05-24 NOTE — DIETITIAN INITIAL EVALUATION ADULT - OTHER INFO
88 year old male with a PMH of CAD, HTN, DM2, CKD, PVD presents with SOB x 4 days, plan for cath today per chart.    Patient reports good appetite in house. No GI distress or chewing/swallowing difficulties reported. Has no food allergies. Reports UBW is in the 170s PTA. Per previous RD note (3/6/22) noted with weight 79.4 kg. ABW is 176 lbs. (80 kg) (5/23 + 5/22) per chart indicating stable weight. No edema or pressure injuries noted per RN flow sheet. Noted with A1c 7.5% (5/23) per chart indicating good glycemic control.    Reviewed cardiac and diabetes nutrition therapy with patient and son. Provided heart healthy nutrition education handout.

## 2022-05-24 NOTE — DIETITIAN INITIAL EVALUATION ADULT - PERTINENT LABORATORY DATA
05-24    136  |  101  |  41<H>  ----------------------------<  134<H>  3.9   |  22  |  1.92<H>    Ca    8.7      24 May 2022 05:31  Phos  3.3     05-24  Mg     2.40     05-24    POCT Blood Glucose.: 76 mg/dL (05-24-22 @ 12:24)  A1C with Estimated Average Glucose Result: 7.5 % (05-23-22 @ 06:22)  A1C with Estimated Average Glucose Result: 8.8 % (02-27-22 @ 02:24)

## 2022-05-24 NOTE — PROGRESS NOTE ADULT - SUBJECTIVE AND OBJECTIVE BOX
PATIENT SEEN AND EXAMINED ON :- 5/24/22  DATE OF SERVICE:  5/24/22           Interim events noted,Labs ,Radiological studies and Cardiology tests reviewed .   HOSPITAL COURSE: HPI:  88M with PMH of CAD s/p CABG, s/p stents, recent STEMI (3/2022), HTN, DM2, CKD, PVD s/p RLE stent who presents to the hospital with SOB x4 days. States his SOB has gradually worsened. He has associated left-sided/substernal chest pressure w/o radiation. He does report noticing a "nerve pain" of his RUE, which is new. His baseline exercise tolerance is 100 yd and in recent days, he can barely ambulate 10 feet. He denies orthopnea, but does report PND. He states he has been adherent w/ all of his medications. He reports being adherent to an an appropriate heart diet. Since arriving and receiving lasix, he reports improvement of his symptoms. His SOB has resolved. However, he continues to notice a slight substernal pressure and right UE pain. No recent illnesses, no SC, no NV, abd pain, diarrhea, dysuria. He has voided a good amount since receiving IV lasix.    ED Course: 120/43, 85, 18, 99F, 98% RA. Received lasix, ASA (22 May 2022 09:32)      INTERIM EVENTS:Patient seen at bedside ,interim events noted.      PMH -reviewed admission note, no change since admission  HEART FAILURE: Acute[ ]Chronic[ ] Systolic[ ] Diastolic[ ] Combined Systolic and Diastolic[ ]  CAD[ ] CABG[ ] PCI[ ]  DEVICES[ ] PPM[ ] ICD[ ] ILR[ ]  ATRIAL FIBRILLATION[ ] Paroxysmal[ ] Permanent[ ]  MABLE[ ] CKD1[ ] CKD2[ ] CKD3[ ] CKD4[ ] ESRD[ ]  COPD[ ] HTN[ ]   DM[ ] Type1[ ] Type 2[ ]   CVA[ ] Paresis[ ]    AMBULATION: Assisted[ ] Cane/walker[ ] Independent[ ]    MEDICATIONS  (STANDING):  aspirin enteric coated 81 milliGRAM(s) Oral daily  carvedilol 6.25 milliGRAM(s) Oral every 12 hours  cholecalciferol 1000 Unit(s) Oral daily  dextrose 5%. 1000 milliLiter(s) (50 mL/Hr) IV Continuous <Continuous>  dextrose 5%. 1000 milliLiter(s) (100 mL/Hr) IV Continuous <Continuous>  dextrose 50% Injectable 25 Gram(s) IV Push once  dextrose 50% Injectable 12.5 Gram(s) IV Push once  dextrose 50% Injectable 25 Gram(s) IV Push once  escitalopram 10 milliGRAM(s) Oral daily  furosemide   Injectable 40 milliGRAM(s) IV Push daily  gabapentin 400 milliGRAM(s) Oral two times a day  glucagon  Injectable 1 milliGRAM(s) IntraMuscular once  heparin   Injectable 5000 Unit(s) SubCutaneous every 8 hours  hydrALAZINE 10 milliGRAM(s) Oral three times a day  insulin glargine Injectable (LANTUS) 10 Unit(s) SubCutaneous at bedtime  insulin lispro (ADMELOG) corrective regimen sliding scale   SubCutaneous three times a day before meals  insulin lispro (ADMELOG) corrective regimen sliding scale   SubCutaneous at bedtime  isosorbide   dinitrate Tablet (ISORDIL) 10 milliGRAM(s) Oral three times a day  pantoprazole    Tablet 40 milliGRAM(s) Oral before breakfast  polyethylene glycol 3350 17 Gram(s) Oral daily  rosuvastatin 40 milliGRAM(s) Oral at bedtime  ticagrelor 90 milliGRAM(s) Oral two times a day    MEDICATIONS  (PRN):  dextrose Oral Gel 15 Gram(s) Oral once PRN Blood Glucose LESS THAN 70 milliGRAM(s)/deciliter            REVIEW OF SYSTEMS:  Constitutional: [ ] fever, [ ]weight loss,  [ ]fatigue  Eyes: [ ] visual changes  Respiratory: [ ]shortness of breath;  [ ] cough, [ ]wheezing, [ ]chills, [ ]hemoptysis  Cardiovascular: [ ] chest pain, [ ]palpitations, [ ]dizziness,  [ ]leg swelling[ ]orthopnea[ ]PND  Gastrointestinal: [ ] abdominal pain, [ ]nausea, [ ]vomiting,  [ ]diarrhea [ ]Constipation [ ]Melena  Genitourinary: [ ] dysuria, [ ] hematuria [ ]Moss  Neurologic: [ ] headaches [ ] tremors[ ]weakness [ ]Paralysis Right[ ] Left[ ]  Skin: [ ] itching, [ ]burning, [ ] rashes  Endocrine: [ ] heat or cold intolerance  Musculoskeletal: [ ] joint pain or swelling; [ ] muscle, back, or extremity pain  Psychiatric: [ ] depression, [ ]anxiety, [ ]mood swings, or [ ]difficulty sleeping  Hematologic: [ ] easy bruising, [ ] bleeding gums    [ ] All remaining systems negative except as per above.   [ ]Unable to obtain.  [x] No change in ROS since admission      Vital Signs Last 24 Hrs  T(C): 36.6 (24 May 2022 12:37), Max: 36.6 (24 May 2022 12:37)  T(F): 97.9 (24 May 2022 12:37), Max: 97.9 (24 May 2022 12:37)  HR: 89 (24 May 2022 12:37) (69 - 89)  BP: 109/66 (24 May 2022 12:37) (109/66 - 130/69)  BP(mean): --  RR: 19 (24 May 2022 12:37) (18 - 19)  SpO2: 99% (24 May 2022 12:37) (96% - 100%)  I&O's Summary    23 May 2022 07:01  -  24 May 2022 07:00  --------------------------------------------------------  IN: 670 mL / OUT: 1600 mL / NET: -930 mL        PHYSICAL EXAM:  General: No acute distress BMI-  HEENT: EOMI, PERRL  Neck: Supple, [ ] JVD  Lungs: Equal air entry bilaterally; [ ] rales [ ] wheezing [ ] rhonchi  Heart: Regular rate and rhythm; [x ] murmur   2/6 [ x] systolic [ ] diastolic [ ] radiation[ ] rubs [ ]  gallops  Abdomen: Nontender, bowel sounds present  Extremities: No clubbing, cyanosis, [ ] edema [ ]Pulses  equal and intact  Nervous system:  Alert & Oriented X3, no focal deficits  Psychiatric: Normal affect  Skin: No rashes or lesions    LABS:  05-24    136  |  101  |  41<H>  ----------------------------<  134<H>  3.9   |  22  |  1.92<H>    Ca    8.7      24 May 2022 05:31  Phos  3.3     05-24  Mg     2.40     05-24      Creatinine Trend: 1.92<--, 1.84<--, 1.82<--                        13.1   7.30  )-----------( 234      ( 24 May 2022 05:31 )             39.8         Serum Pro-Brain Natriuretic Peptide: 818 pg/mL (05-24-22 @ 05:31)

## 2022-05-24 NOTE — DIETITIAN INITIAL EVALUATION ADULT - PERTINENT MEDS FT
MEDICATIONS  (STANDING):  aspirin enteric coated 81 milliGRAM(s) Oral daily  carvedilol 6.25 milliGRAM(s) Oral every 12 hours  cholecalciferol 1000 Unit(s) Oral daily  dextrose 5%. 1000 milliLiter(s) (50 mL/Hr) IV Continuous <Continuous>  dextrose 5%. 1000 milliLiter(s) (100 mL/Hr) IV Continuous <Continuous>  dextrose 50% Injectable 25 Gram(s) IV Push once  dextrose 50% Injectable 12.5 Gram(s) IV Push once  dextrose 50% Injectable 25 Gram(s) IV Push once  escitalopram 10 milliGRAM(s) Oral daily  furosemide   Injectable 40 milliGRAM(s) IV Push daily  gabapentin 400 milliGRAM(s) Oral two times a day  glucagon  Injectable 1 milliGRAM(s) IntraMuscular once  heparin   Injectable 5000 Unit(s) SubCutaneous every 8 hours  hydrALAZINE 10 milliGRAM(s) Oral three times a day  insulin glargine Injectable (LANTUS) 10 Unit(s) SubCutaneous at bedtime  insulin lispro (ADMELOG) corrective regimen sliding scale   SubCutaneous three times a day before meals  insulin lispro (ADMELOG) corrective regimen sliding scale   SubCutaneous at bedtime  isosorbide   dinitrate Tablet (ISORDIL) 10 milliGRAM(s) Oral three times a day  pantoprazole    Tablet 40 milliGRAM(s) Oral before breakfast  polyethylene glycol 3350 17 Gram(s) Oral daily  rosuvastatin 40 milliGRAM(s) Oral at bedtime  ticagrelor 90 milliGRAM(s) Oral two times a day    MEDICATIONS  (PRN):  dextrose Oral Gel 15 Gram(s) Oral once PRN Blood Glucose LESS THAN 70 milliGRAM(s)/deciliter

## 2022-05-24 NOTE — DIETITIAN INITIAL EVALUATION ADULT - ORAL INTAKE PTA/DIET HISTORY
Patient seen for assessment with son at bed side. Reports good appetite PTA. Per diet recall, patient with long gaps between meals and no snacks in between. Reports checking blood sugars regularly with readings between  mg/dL and monitors carbohydrate/sodium intake at home. States not being Vegan, but follows Halal diet at home.

## 2022-05-25 LAB
ANION GAP SERPL CALC-SCNC: 13 MMOL/L — SIGNIFICANT CHANGE UP (ref 7–14)
BUN SERPL-MCNC: 40 MG/DL — HIGH (ref 7–23)
CALCIUM SERPL-MCNC: 8.4 MG/DL — SIGNIFICANT CHANGE UP (ref 8.4–10.5)
CHLORIDE SERPL-SCNC: 101 MMOL/L — SIGNIFICANT CHANGE UP (ref 98–107)
CO2 SERPL-SCNC: 24 MMOL/L — SIGNIFICANT CHANGE UP (ref 22–31)
CREAT SERPL-MCNC: 2.15 MG/DL — HIGH (ref 0.5–1.3)
EGFR: 29 ML/MIN/1.73M2 — LOW
GLUCOSE BLDC GLUCOMTR-MCNC: 165 MG/DL — HIGH (ref 70–99)
GLUCOSE BLDC GLUCOMTR-MCNC: 211 MG/DL — HIGH (ref 70–99)
GLUCOSE BLDC GLUCOMTR-MCNC: 213 MG/DL — HIGH (ref 70–99)
GLUCOSE BLDC GLUCOMTR-MCNC: 248 MG/DL — HIGH (ref 70–99)
GLUCOSE SERPL-MCNC: 212 MG/DL — HIGH (ref 70–99)
HCT VFR BLD CALC: 40.4 % — SIGNIFICANT CHANGE UP (ref 39–50)
HGB BLD-MCNC: 13.2 G/DL — SIGNIFICANT CHANGE UP (ref 13–17)
MAGNESIUM SERPL-MCNC: 2.4 MG/DL — SIGNIFICANT CHANGE UP (ref 1.6–2.6)
MCHC RBC-ENTMCNC: 30 PG — SIGNIFICANT CHANGE UP (ref 27–34)
MCHC RBC-ENTMCNC: 32.7 GM/DL — SIGNIFICANT CHANGE UP (ref 32–36)
MCV RBC AUTO: 91.8 FL — SIGNIFICANT CHANGE UP (ref 80–100)
NRBC # BLD: 0 /100 WBCS — SIGNIFICANT CHANGE UP
NRBC # FLD: 0 K/UL — SIGNIFICANT CHANGE UP
PHOSPHATE SERPL-MCNC: 3.1 MG/DL — SIGNIFICANT CHANGE UP (ref 2.5–4.5)
PLATELET # BLD AUTO: 226 K/UL — SIGNIFICANT CHANGE UP (ref 150–400)
POTASSIUM SERPL-MCNC: 3.8 MMOL/L — SIGNIFICANT CHANGE UP (ref 3.5–5.3)
POTASSIUM SERPL-SCNC: 3.8 MMOL/L — SIGNIFICANT CHANGE UP (ref 3.5–5.3)
RBC # BLD: 4.4 M/UL — SIGNIFICANT CHANGE UP (ref 4.2–5.8)
RBC # FLD: 13.3 % — SIGNIFICANT CHANGE UP (ref 10.3–14.5)
SODIUM SERPL-SCNC: 138 MMOL/L — SIGNIFICANT CHANGE UP (ref 135–145)
WBC # BLD: 7.32 K/UL — SIGNIFICANT CHANGE UP (ref 3.8–10.5)
WBC # FLD AUTO: 7.32 K/UL — SIGNIFICANT CHANGE UP (ref 3.8–10.5)

## 2022-05-25 RX ADMIN — Medication 2: at 13:00

## 2022-05-25 RX ADMIN — GABAPENTIN 400 MILLIGRAM(S): 400 CAPSULE ORAL at 05:16

## 2022-05-25 RX ADMIN — ROSUVASTATIN CALCIUM 40 MILLIGRAM(S): 5 TABLET ORAL at 21:47

## 2022-05-25 RX ADMIN — Medication 10 MILLIGRAM(S): at 21:47

## 2022-05-25 RX ADMIN — HEPARIN SODIUM 5000 UNIT(S): 5000 INJECTION INTRAVENOUS; SUBCUTANEOUS at 05:17

## 2022-05-25 RX ADMIN — POLYETHYLENE GLYCOL 3350 17 GRAM(S): 17 POWDER, FOR SOLUTION ORAL at 13:02

## 2022-05-25 RX ADMIN — INSULIN GLARGINE 10 UNIT(S): 100 INJECTION, SOLUTION SUBCUTANEOUS at 22:48

## 2022-05-25 RX ADMIN — HEPARIN SODIUM 5000 UNIT(S): 5000 INJECTION INTRAVENOUS; SUBCUTANEOUS at 13:01

## 2022-05-25 RX ADMIN — ISOSORBIDE DINITRATE 10 MILLIGRAM(S): 5 TABLET ORAL at 05:16

## 2022-05-25 RX ADMIN — Medication 10 MILLIGRAM(S): at 05:16

## 2022-05-25 RX ADMIN — ISOSORBIDE DINITRATE 10 MILLIGRAM(S): 5 TABLET ORAL at 21:47

## 2022-05-25 RX ADMIN — Medication 10 MILLIGRAM(S): at 13:01

## 2022-05-25 RX ADMIN — Medication 81 MILLIGRAM(S): at 13:02

## 2022-05-25 RX ADMIN — ESCITALOPRAM OXALATE 10 MILLIGRAM(S): 10 TABLET, FILM COATED ORAL at 13:01

## 2022-05-25 RX ADMIN — PANTOPRAZOLE SODIUM 40 MILLIGRAM(S): 20 TABLET, DELAYED RELEASE ORAL at 05:16

## 2022-05-25 RX ADMIN — TICAGRELOR 90 MILLIGRAM(S): 90 TABLET ORAL at 05:15

## 2022-05-25 RX ADMIN — Medication 40 MILLIGRAM(S): at 05:17

## 2022-05-25 RX ADMIN — Medication 4: at 09:44

## 2022-05-25 RX ADMIN — TICAGRELOR 90 MILLIGRAM(S): 90 TABLET ORAL at 17:25

## 2022-05-25 RX ADMIN — CARVEDILOL PHOSPHATE 6.25 MILLIGRAM(S): 80 CAPSULE, EXTENDED RELEASE ORAL at 05:16

## 2022-05-25 RX ADMIN — CARVEDILOL PHOSPHATE 6.25 MILLIGRAM(S): 80 CAPSULE, EXTENDED RELEASE ORAL at 17:27

## 2022-05-25 RX ADMIN — Medication 4: at 18:06

## 2022-05-25 RX ADMIN — Medication 1000 UNIT(S): at 13:02

## 2022-05-25 RX ADMIN — GABAPENTIN 400 MILLIGRAM(S): 400 CAPSULE ORAL at 17:25

## 2022-05-25 RX ADMIN — ISOSORBIDE DINITRATE 10 MILLIGRAM(S): 5 TABLET ORAL at 13:01

## 2022-05-25 RX ADMIN — HEPARIN SODIUM 5000 UNIT(S): 5000 INJECTION INTRAVENOUS; SUBCUTANEOUS at 21:56

## 2022-05-25 NOTE — PROGRESS NOTE ADULT - SUBJECTIVE AND OBJECTIVE BOX
PATIENT SEEN AND EXAMINED ON :- 5/25/22  DATE OF SERVICE:  5/25/22        Interim events noted,Labs ,Radiological studies and Cardiology tests reviewed .   HOSPITAL COURSE: HPI:  88M with PMH of CAD s/p CABG, s/p stents, recent STEMI (3/2022), HTN, DM2, CKD, PVD s/p RLE stent who presents to the hospital with SOB x4 days. States his SOB has gradually worsened. He has associated left-sided/substernal chest pressure w/o radiation. He does report noticing a "nerve pain" of his RUE, which is new. His baseline exercise tolerance is 100 yd and in recent days, he can barely ambulate 10 feet. He denies orthopnea, but does report PND. He states he has been adherent w/ all of his medications. He reports being adherent to an an appropriate heart diet. Since arriving and receiving lasix, he reports improvement of his symptoms. His SOB has resolved. However, he continues to notice a slight substernal pressure and right UE pain. No recent illnesses, no SC, no NV, abd pain, diarrhea, dysuria. He has voided a good amount since receiving IV lasix.    ED Course: 120/43, 85, 18, 99F, 98% RA. Received lasix, ASA (22 May 2022 09:32)      INTERIM EVENTS:Patient seen at bedside ,interim events noted.      PMH -reviewed admission note, no change since admission  HEART FAILURE: Acute[ ]Chronic[ ] Systolic[ ] Diastolic[ ] Combined Systolic and Diastolic[ ]  CAD[ ] CABG[ ] PCI[ ]  DEVICES[ ] PPM[ ] ICD[ ] ILR[ ]  ATRIAL FIBRILLATION[ ] Paroxysmal[ ] Permanent[ ]  MABLE[ ] CKD1[ ] CKD2[ ] CKD3[ ] CKD4[ ] ESRD[ ]  COPD[ ] HTN[ ]   DM[ ] Type1[ ] Type 2[ ]   CVA[ ] Paresis[ ]    AMBULATION: Assisted[ ] Cane/walker[ ] Independent[ ]    MEDICATIONS  (STANDING):  aspirin enteric coated 81 milliGRAM(s) Oral daily  carvedilol 6.25 milliGRAM(s) Oral every 12 hours  cholecalciferol 1000 Unit(s) Oral daily  dextrose 5%. 1000 milliLiter(s) (50 mL/Hr) IV Continuous <Continuous>  dextrose 5%. 1000 milliLiter(s) (100 mL/Hr) IV Continuous <Continuous>  dextrose 50% Injectable 25 Gram(s) IV Push once  dextrose 50% Injectable 12.5 Gram(s) IV Push once  dextrose 50% Injectable 25 Gram(s) IV Push once  escitalopram 10 milliGRAM(s) Oral daily  gabapentin 400 milliGRAM(s) Oral two times a day  glucagon  Injectable 1 milliGRAM(s) IntraMuscular once  heparin   Injectable 5000 Unit(s) SubCutaneous every 8 hours  hydrALAZINE 10 milliGRAM(s) Oral three times a day  insulin glargine Injectable (LANTUS) 10 Unit(s) SubCutaneous at bedtime  insulin lispro (ADMELOG) corrective regimen sliding scale   SubCutaneous three times a day before meals  insulin lispro (ADMELOG) corrective regimen sliding scale   SubCutaneous at bedtime  isosorbide   dinitrate Tablet (ISORDIL) 10 milliGRAM(s) Oral three times a day  pantoprazole    Tablet 40 milliGRAM(s) Oral before breakfast  polyethylene glycol 3350 17 Gram(s) Oral daily  rosuvastatin 40 milliGRAM(s) Oral at bedtime  ticagrelor 90 milliGRAM(s) Oral two times a day    MEDICATIONS  (PRN):  dextrose Oral Gel 15 Gram(s) Oral once PRN Blood Glucose LESS THAN 70 milliGRAM(s)/deciliter            REVIEW OF SYSTEMS:  Constitutional: [ ] fever, [ ]weight loss,  [ ]fatigue  Eyes: [ ] visual changes  Respiratory: [ ]shortness of breath;  [ ] cough, [ ]wheezing, [ ]chills, [ ]hemoptysis  Cardiovascular: [ ] chest pain, [ ]palpitations, [ ]dizziness,  [ ]leg swelling[ ]orthopnea[ ]PND  Gastrointestinal: [ ] abdominal pain, [ ]nausea, [ ]vomiting,  [ ]diarrhea [ ]Constipation [ ]Melena  Genitourinary: [ ] dysuria, [ ] hematuria [ ]Moss  Neurologic: [ ] headaches [ ] tremors[ ]weakness [ ]Paralysis Right[ ] Left[ ]  Skin: [ ] itching, [ ]burning, [ ] rashes  Endocrine: [ ] heat or cold intolerance  Musculoskeletal: [ ] joint pain or swelling; [ ] muscle, back, or extremity pain  Psychiatric: [ ] depression, [ ]anxiety, [ ]mood swings, or [ ]difficulty sleeping  Hematologic: [ ] easy bruising, [ ] bleeding gums    [ ] All remaining systems negative except as per above.   [ ]Unable to obtain.  [x] No change in ROS since admission      Vital Signs Last 24 Hrs  T(C): 36.4 (25 May 2022 17:00), Max: 37.2 (25 May 2022 13:00)  T(F): 97.6 (25 May 2022 17:00), Max: 99 (25 May 2022 13:00)  HR: 85 (25 May 2022 17:00) (74 - 85)  BP: 134/62 (25 May 2022 17:00) (116/51 - 134/62)  BP(mean): --  RR: 18 (25 May 2022 17:00) (18 - 18)  SpO2: 100% (25 May 2022 17:00) (99% - 100%)  I&O's Summary    24 May 2022 07:01  -  25 May 2022 07:00  --------------------------------------------------------  IN: 120 mL / OUT: 600 mL / NET: -480 mL    25 May 2022 07:01  -  25 May 2022 20:00  --------------------------------------------------------  IN: 650 mL / OUT: 925 mL / NET: -275 mL        PHYSICAL EXAM:  General: No acute distress BMI-  HEENT: EOMI, PERRL  Neck: Supple, [ ] JVD  Lungs: Equal air entry bilaterally; [ ] rales [ ] wheezing [ ] rhonchi  Heart: Regular rate and rhythm; [x ] murmur   2/6 [ x] systolic [ ] diastolic [ ] radiation[ ] rubs [ ]  gallops  Abdomen: Nontender, bowel sounds present  Extremities: No clubbing, cyanosis, [ ] edema [ ]Pulses  equal and intact  Nervous system:  Alert & Oriented X3, no focal deficits  Psychiatric: Normal affect  Skin: No rashes or lesions    LABS:  05-25    138  |  101  |  40<H>  ----------------------------<  212<H>  3.8   |  24  |  2.15<H>    Ca    8.4      25 May 2022 05:53  Phos  3.1     05-25  Mg     2.40     05-25      Creatinine Trend: 2.15<--, 1.92<--, 1.84<--, 1.82<--                        13.2   7.32  )-----------( 226      ( 25 May 2022 05:53 )             40.4         Serum Pro-Brain Natriuretic Peptide: 818 pg/mL (05-24-22 @ 05:31)      < from: Transthoracic Echocardiogram (05.23.22 @ 08:21) >  CONCLUSIONS:  1. Mitral annular calcification, otherwise normal mitral  valve. Mild-moderate mitral regurgitation.  2. Aortic valve leaflet morphology not well visualized.  Peak transaortic valve gradient equals 18 mm Hg, mean  transaortic valve gradient equals 10 mm Hg, consistent with  mild aortic stenosis. Minimal aortic regurgitation.  3. Mildly dilated left atrium.  LA volume index = 41 cc/m2.  4. Normal left ventricular internal dimensions and wall  thicknesses.  5. Endocardium not well visualized; grossly normal left  ventricular systolic function.  6. Mild diastolic dysfunction (Stage I).  7. The right ventricle is not well visualized;grossly  normal right ventricular systolic function.  ------------------------------------------------------------------------  Confirmed on  5/23/2022 - 10:41:31 by Sai Blake M.D.,  [facility], [place]  ------------------------------------------------------------------------    < end of copied text >    < from: Nuclear Stress Test-Pharmacologic (Nuclear Stress Test-Pharmacologic .) (05.24.22 @ 14:44) >  STRESS TEST IMPRESSIONS:  Chest Pain: No chest pain with administration of  Regadenoson.  Symptom: Shortness of breath, Lightheaded .  HR Response: Appropriate.  BP Response: Appropriate.  Heart Rhythm: Normal Sinus Rhythm - 62 BPM - 1st Degree  Heart Block.  Conduction defects: LAFB.  ECG Abnormalities: ECG changes could not be interpreted  due to abnormal baseline ECG.  Arrhythmia: None.  ------------------------------------------------------------------------  PROCEDURE:  7.2 mCi of Tc 99m Tetrofosmin were injected during stress  protocol. Approximately 45 minutes later, tomographic  images were obtained in a 180 degree arc from right  anterior oblique to left anterior oblique with 64 stops.  At a separate time on 05/25/2022, 7.34 mCi of Tc 99m  Tetrofosmin were injected at rest. Approximately 1 day(s)  later, tomographic images were obtained in a 180 degree  arc from rightanterior oblique to left anterior oblique  with 64 stops. The tomographic slices were reconstructed  in 3 orthogonal planes (short axis, horizontal long axis  and vertical long axis).  Interpretation was performed  both by visual and quantitative analysis.  Rest and stress images were acquired using CZT-based  system with pinhole collimation (NCLC 530 c, [employer]), and reconstructed using MLEM algorithm.  Images were re-acquired with the patient in a prone  position.  ------------------------------------------------------------------------  NUCLEAR FINDINGS:  Review of raw data shows: The study is of good technical  quality.  There are large, severe defects in inferior and  inferolateral walls that is predominantly reversible,  consistent with infarct with significant mateo-infarct  ischemia.There is a medium sized, moderate defect in  anterior wall that is reversible, consistent with  ischemia.  ------------------------------------------------------------------------  GATED ANALYSIS:  Post-stress gated wall motion analysis was performed (LVEF  = 32 %;LVEDV = 90 ml.), revealing severe hypokinesis in  the inferior and inferolateral walls.  ------------------------------------------------------------------------  IMPRESSIONS:Abnormal Study  * Myocardial Perfusion SPECT results are abnormal.  * There are large, severe defects in inferior and  inferolateral walls that is predominantly reversible,  consistent with infarct with significant mateo-infarct  ischemia.There is a medium sized, moderate defect in  anterior wall that is reversible, consistent with  ischemia.  * Post-stress gated wall motion analysis was performed  (LVEF = 32 %;LVEDV = 90 ml.), revealing severe hypokinesis  in the inferior and inferolateral walls.  *** No previous Nuclear/Stress exam.  ------------------------------------------------------------------------  Confirmed on  5/25/2022 - 14:25:35 by Zenia Rubin MD  ------------------------------------------------------------------------    < end of copied text >

## 2022-05-25 NOTE — PHARMACOTHERAPY INTERVENTION NOTE - COMMENTS
Current medications reviewed with the patient. Medication schedule was discussed in detail including: medication name, indication, dose, administration times, treatment duration, side effects, drug interactions, and special instructions. Patient questions and concerns were answered and addressed. Patient demonstrated understanding. Informed patient that medication list may change prior to discharge.

## 2022-05-26 ENCOUNTER — TRANSCRIPTION ENCOUNTER (OUTPATIENT)
Age: 87
End: 2022-05-26

## 2022-05-26 LAB
ANION GAP SERPL CALC-SCNC: 14 MMOL/L — SIGNIFICANT CHANGE UP (ref 7–14)
BUN SERPL-MCNC: 38 MG/DL — HIGH (ref 7–23)
CALCIUM SERPL-MCNC: 8.6 MG/DL — SIGNIFICANT CHANGE UP (ref 8.4–10.5)
CHLORIDE SERPL-SCNC: 97 MMOL/L — LOW (ref 98–107)
CO2 SERPL-SCNC: 24 MMOL/L — SIGNIFICANT CHANGE UP (ref 22–31)
CREAT SERPL-MCNC: 2.01 MG/DL — HIGH (ref 0.5–1.3)
EGFR: 31 ML/MIN/1.73M2 — LOW
GLUCOSE BLDC GLUCOMTR-MCNC: 106 MG/DL — HIGH (ref 70–99)
GLUCOSE BLDC GLUCOMTR-MCNC: 115 MG/DL — HIGH (ref 70–99)
GLUCOSE BLDC GLUCOMTR-MCNC: 160 MG/DL — HIGH (ref 70–99)
GLUCOSE BLDC GLUCOMTR-MCNC: 196 MG/DL — HIGH (ref 70–99)
GLUCOSE SERPL-MCNC: 244 MG/DL — HIGH (ref 70–99)
HCT VFR BLD CALC: 40.5 % — SIGNIFICANT CHANGE UP (ref 39–50)
HGB BLD-MCNC: 13.5 G/DL — SIGNIFICANT CHANGE UP (ref 13–17)
MAGNESIUM SERPL-MCNC: 2.5 MG/DL — SIGNIFICANT CHANGE UP (ref 1.6–2.6)
MCHC RBC-ENTMCNC: 30.7 PG — SIGNIFICANT CHANGE UP (ref 27–34)
MCHC RBC-ENTMCNC: 33.3 GM/DL — SIGNIFICANT CHANGE UP (ref 32–36)
MCV RBC AUTO: 92 FL — SIGNIFICANT CHANGE UP (ref 80–100)
NRBC # BLD: 0 /100 WBCS — SIGNIFICANT CHANGE UP
NRBC # FLD: 0 K/UL — SIGNIFICANT CHANGE UP
PHOSPHATE SERPL-MCNC: 3.1 MG/DL — SIGNIFICANT CHANGE UP (ref 2.5–4.5)
PLATELET # BLD AUTO: 224 K/UL — SIGNIFICANT CHANGE UP (ref 150–400)
POTASSIUM SERPL-MCNC: 4.2 MMOL/L — SIGNIFICANT CHANGE UP (ref 3.5–5.3)
POTASSIUM SERPL-SCNC: 4.2 MMOL/L — SIGNIFICANT CHANGE UP (ref 3.5–5.3)
RBC # BLD: 4.4 M/UL — SIGNIFICANT CHANGE UP (ref 4.2–5.8)
RBC # FLD: 13.2 % — SIGNIFICANT CHANGE UP (ref 10.3–14.5)
SODIUM SERPL-SCNC: 135 MMOL/L — SIGNIFICANT CHANGE UP (ref 135–145)
WBC # BLD: 7.57 K/UL — SIGNIFICANT CHANGE UP (ref 3.8–10.5)
WBC # FLD AUTO: 7.57 K/UL — SIGNIFICANT CHANGE UP (ref 3.8–10.5)

## 2022-05-26 PROCEDURE — 93010 ELECTROCARDIOGRAM REPORT: CPT

## 2022-05-26 PROCEDURE — 92928 PRQ TCAT PLMT NTRAC ST 1 LES: CPT | Mod: LC

## 2022-05-26 PROCEDURE — 93459 L HRT ART/GRFT ANGIO: CPT | Mod: 26,59

## 2022-05-26 RX ORDER — ACETAMINOPHEN 500 MG
650 TABLET ORAL EVERY 6 HOURS
Refills: 0 | Status: DISCONTINUED | OUTPATIENT
Start: 2022-05-26 | End: 2022-05-27

## 2022-05-26 RX ORDER — SODIUM CHLORIDE 9 MG/ML
1000 INJECTION INTRAMUSCULAR; INTRAVENOUS; SUBCUTANEOUS
Refills: 0 | Status: DISCONTINUED | OUTPATIENT
Start: 2022-05-26 | End: 2022-05-27

## 2022-05-26 RX ORDER — SODIUM CHLORIDE 9 MG/ML
1000 INJECTION INTRAMUSCULAR; INTRAVENOUS; SUBCUTANEOUS
Refills: 0 | Status: DISCONTINUED | OUTPATIENT
Start: 2022-05-26 | End: 2022-05-26

## 2022-05-26 RX ADMIN — Medication 81 MILLIGRAM(S): at 13:45

## 2022-05-26 RX ADMIN — Medication 1000 UNIT(S): at 13:44

## 2022-05-26 RX ADMIN — SODIUM CHLORIDE 75 MILLILITER(S): 9 INJECTION INTRAMUSCULAR; INTRAVENOUS; SUBCUTANEOUS at 19:16

## 2022-05-26 RX ADMIN — ESCITALOPRAM OXALATE 10 MILLIGRAM(S): 10 TABLET, FILM COATED ORAL at 13:45

## 2022-05-26 RX ADMIN — Medication 2: at 09:23

## 2022-05-26 RX ADMIN — SODIUM CHLORIDE 70 MILLILITER(S): 9 INJECTION INTRAMUSCULAR; INTRAVENOUS; SUBCUTANEOUS at 13:41

## 2022-05-26 RX ADMIN — Medication 10 MILLIGRAM(S): at 05:02

## 2022-05-26 RX ADMIN — GABAPENTIN 400 MILLIGRAM(S): 400 CAPSULE ORAL at 05:02

## 2022-05-26 RX ADMIN — PANTOPRAZOLE SODIUM 40 MILLIGRAM(S): 20 TABLET, DELAYED RELEASE ORAL at 05:02

## 2022-05-26 RX ADMIN — CARVEDILOL PHOSPHATE 6.25 MILLIGRAM(S): 80 CAPSULE, EXTENDED RELEASE ORAL at 05:02

## 2022-05-26 RX ADMIN — ISOSORBIDE DINITRATE 10 MILLIGRAM(S): 5 TABLET ORAL at 13:45

## 2022-05-26 RX ADMIN — TICAGRELOR 90 MILLIGRAM(S): 90 TABLET ORAL at 05:09

## 2022-05-26 RX ADMIN — ISOSORBIDE DINITRATE 10 MILLIGRAM(S): 5 TABLET ORAL at 05:02

## 2022-05-26 RX ADMIN — INSULIN GLARGINE 10 UNIT(S): 100 INJECTION, SOLUTION SUBCUTANEOUS at 22:21

## 2022-05-26 RX ADMIN — Medication 2: at 13:24

## 2022-05-26 RX ADMIN — POLYETHYLENE GLYCOL 3350 17 GRAM(S): 17 POWDER, FOR SOLUTION ORAL at 13:43

## 2022-05-26 NOTE — DISCHARGE NOTE PROVIDER - NSDCMRMEDTOKEN_GEN_ALL_CORE_FT
aspirin 81 mg oral delayed release tablet: 1 tab(s) orally once a day  Basaglar KwikPen 100 units/mL subcutaneous solution: 20 unit(s) subcutaneous once a day (at bedtime)  carvedilol 6.25 mg oral tablet: 1 tab(s) orally every 12 hours  Crestor 40 mg oral tablet: 1 tab(s) orally once a day  Dexilant 60 mg oral delayed release capsule: 1 cap(s) orally once a day  furosemide 20 mg oral tablet: 1 tab(s) orally once a day  gabapentin 100 mg oral capsule: 4 cap(s) orally 2 times a day  hydrALAZINE 10 mg oral tablet: 1 tab(s) orally 3 times a day  isosorbide dinitrate 10 mg oral tablet: 1 tab(s) orally 3 times a day  Jardiance 25 mg oral tablet: 1 tab(s) orally once a day (in the morning)  Lexapro 10 mg oral tablet: 1 tab(s) orally once a day  Linzess 145 mcg oral capsule: 1 cap(s) orally once a day  Micardis 20 mg oral tablet: 1 tab(s) orally once a day  polyethylene glycol 3350 oral powder for reconstitution: 17 gram(s) orally once a day  ticagrelor 90 mg oral tablet: 1 tab(s) orally every 12 hours  Tradjenta 5 mg oral tablet: 1 tab(s) orally once a day  Vitamin D3 25 mcg (1000 intl units) oral capsule: 1 cap(s) orally once a day   acetaminophen 325 mg oral tablet: 2 tab(s) orally every 6 hours, As needed, Mild Pain (1 - 3), Moderate Pain (4 - 6)  aspirin 81 mg oral delayed release tablet: 1 tab(s) orally once a day  Basaglar KwikPen 100 units/mL subcutaneous solution: 20 unit(s) subcutaneous once a day (at bedtime)  carvedilol 6.25 mg oral tablet: 1 tab(s) orally every 12 hours  Crestor 40 mg oral tablet: 1 tab(s) orally once a day  Dexilant 60 mg oral delayed release capsule: 1 cap(s) orally once a day  furosemide 20 mg oral tablet: 1 tab(s) orally once a day  gabapentin 100 mg oral capsule: 4 cap(s) orally 2 times a day  hydrALAZINE 10 mg oral tablet: 1 tab(s) orally 3 times a day  isosorbide dinitrate 10 mg oral tablet: 1 tab(s) orally 3 times a day  Jardiance 25 mg oral tablet: 1 tab(s) orally once a day (in the morning)  Lexapro 10 mg oral tablet: 1 tab(s) orally once a day  Linzess 145 mcg oral capsule: 1 cap(s) orally once a day  Micardis 20 mg oral tablet: 1 tab(s) orally once a day  polyethylene glycol 3350 oral powder for reconstitution: 17 gram(s) orally once a day  ticagrelor 90 mg oral tablet: 1 tab(s) orally every 12 hours  Tradjenta 5 mg oral tablet: 1 tab(s) orally once a day  Vitamin D3 25 mcg (1000 intl units) oral capsule: 1 cap(s) orally once a day

## 2022-05-26 NOTE — CONSULT NOTE ADULT - SUBJECTIVE AND OBJECTIVE BOX
NEPHROLOGY - NSN    Patient seen and examined.    HPI:  88M with PMH of CAD s/p CABG, s/p stents, recent STEMI (3/2022), HTN, DM2, CKD, PVD s/p RLE stent who presents to the hospital with SOB x4 days. States his SOB has gradually worsened. He has associated left-sided/substernal chest pressure w/o radiation. He does report noticing a "nerve pain" of his RUE, which is new. His baseline exercise tolerance is 100 yd and in recent days, he can barely ambulate 10 feet. He denies orthopnea, but does report PND. He states he has been adherent w/ all of his medications. He reports being adherent to an an appropriate heart diet. Since arriving and receiving lasix, he reports improvement of his symptoms. His SOB has resolved. However, he continues to notice a slight substernal pressure and right UE pain. No recent illnesses, no SC, no NV, abd pain, diarrhea, dysuria. He has voided a good amount since receiving IV lasix.    ED Course: 120/43, 85, 18, 99F, 98% RA. Received lasix, ASA (22 May 2022 09:32)      PAST MEDICAL & SURGICAL HISTORY:  Hyperlipemia      Hypertension      Coronary Artery Disease      Diabetes Mellitus Type II      Stented Coronary Artery  total 5 stents, last stent 5/2019      Neuropathy      Myocardial infarction      Stroke  mild left facial numbness   no other residuals verbalized      History of Cataract Extraction      Hx of CABG      H/O coronary angiogram      S/P coronary artery stent placement  1/6/09          MEDICATIONS  (STANDING):  aspirin enteric coated 81 milliGRAM(s) Oral daily  carvedilol 6.25 milliGRAM(s) Oral every 12 hours  cholecalciferol 1000 Unit(s) Oral daily  dextrose 5%. 1000 milliLiter(s) (50 mL/Hr) IV Continuous <Continuous>  dextrose 5%. 1000 milliLiter(s) (100 mL/Hr) IV Continuous <Continuous>  dextrose 50% Injectable 25 Gram(s) IV Push once  dextrose 50% Injectable 12.5 Gram(s) IV Push once  dextrose 50% Injectable 25 Gram(s) IV Push once  escitalopram 10 milliGRAM(s) Oral daily  gabapentin 400 milliGRAM(s) Oral two times a day  glucagon  Injectable 1 milliGRAM(s) IntraMuscular once  heparin   Injectable 5000 Unit(s) SubCutaneous every 8 hours  hydrALAZINE 10 milliGRAM(s) Oral three times a day  insulin glargine Injectable (LANTUS) 10 Unit(s) SubCutaneous at bedtime  insulin lispro (ADMELOG) corrective regimen sliding scale   SubCutaneous three times a day before meals  insulin lispro (ADMELOG) corrective regimen sliding scale   SubCutaneous at bedtime  isosorbide   dinitrate Tablet (ISORDIL) 10 milliGRAM(s) Oral three times a day  pantoprazole    Tablet 40 milliGRAM(s) Oral before breakfast  polyethylene glycol 3350 17 Gram(s) Oral daily  rosuvastatin 40 milliGRAM(s) Oral at bedtime  ticagrelor 90 milliGRAM(s) Oral two times a day      Allergies    carbamazepine (Other)  Cipro (Unknown)  fluoroquinolone antibiotics (Other)  Tegretol (Unknown)    Intolerances        SOCIAL HISTORY:  Denies alcohol abuse, drug abuse or tobacco usage.     FAMILY HISTORY:      VITALS:  T(C): 36.6 (05-26-22 @ 04:48), Max: 37.2 (05-25-22 @ 13:00)  HR: 82 (05-26-22 @ 04:48) (72 - 85)  BP: 138/61 (05-26-22 @ 04:48) (116/68 - 144/57)  RR: 16 (05-26-22 @ 04:48) (16 - 18)  SpO2: 100% (05-26-22 @ 04:48) (100% - 100%)    REVIEW OF SYSTEMS:  Denies any nausea, vomiting, diarrhea, fever or chills. + fatigue or weakness. All other pertinent systems are reviewed and are negative.    PHYSICAL EXAM:  Constitutional: NAD  HEENT: EOMI  Neck:  No JVD, supple   Respiratory: CTA B/L  Cardiovascular: S1 and S2, RRR  Gastrointestinal: + BS, soft, NT, ND  Extremities: No peripheral edema, + peripheral pulses  Neurological: A/O x 3, CN2-12 intact  Psychiatric: Normal mood, normal affect  : No Moss  Skin: No rashes, C/D/I  Access: Not applicable    I and O's:    05-24 @ 07:01  -  05-25 @ 07:00  --------------------------------------------------------  IN: 120 mL / OUT: 600 mL / NET: -480 mL    05-25 @ 07:01  -  05-26 @ 07:00  --------------------------------------------------------  IN: 940 mL / OUT: 2050 mL / NET: -1110 mL          LABS:                        13.5   7.57  )-----------( 224      ( 26 May 2022 04:38 )             40.5     05-26    135  |  97<L>  |  38<H>  ----------------------------<  244<H>  4.2   |  24  |  2.01<H>    Ca    8.6      26 May 2022 04:38  Phos  3.1     05-26  Mg     2.50     05-26        URINE:      RADIOLOGY & ADDITIONAL STUDIES:    < from: Xray Chest 1 View- PORTABLE-Urgent (05.22.22 @ 01:56) >    ACC: [id number] EXAM:  XR CHEST PORTABLE URGENT 1V                          PROCEDURE DATE:  05/22/2022          INTERPRETATION:  CLINICAL INFORMATION: Chest pain    TECHNIQUE: Frontal radiograph of the chest    COMPARISON: Chest radiograph  2/28/2022    FINDINGS:    The lungs are free of focal consolidations. No pneumothorax. The heart   size not well evaluated on this projection. Status post coronary stent   and sternotomy. No acute osseous findings.    IMPRESSION: No acute pulmonary disease.      --- End of Report ---          ABRAHAM ALSTON MD; Resident Radiologist  This document has been electronically signed.  AMELIA ANGLIN MD; Attending Radiologist  This document has been electronically signed. May 22 2022  7:44AM    < end of copied text >   NEPHROLOGY - NSN    Patient seen and examined.    HPI:  88M with PMH of CAD s/p CABG, s/p stents, recent STEMI (3/2022), HTN, DM2, CKD, PVD s/p RLE stent who presents to the hospital with SOB x4 days. States his SOB has gradually worsened. He has associated left-sided/substernal chest pressure w/o radiation. He does report noticing a "nerve pain" of his RUE, which is new. His baseline exercise tolerance is 100 yd and in recent days, he can barely ambulate 10 feet. He denies orthopnea, but does report PND. He states he has been adherent w/ all of his medications. He reports being adherent to an an appropriate heart diet. Since arriving and receiving lasix, he reports improvement of his symptoms. His SOB has resolved. However, he continues to notice a slight substernal pressure and right UE pain. No recent illnesses, no SC, no NV, abd pain, diarrhea, dysuria. He has voided a good amount since receiving IV lasix.  He has baseline renal dysfunction and creatinine of 2.0 1/22 per his daughter in law who also happens to be his physician   There is no hematuria or bubbles in the urine.  No history of NSAIDS or nephrolithisis.  The patient urinates once or twice in the night and there is no incontinence.  No family hx or renal disease or back pain.    No recent abx use.  No alleviating or aggravating factors with respect to the kidneys.   DM since 1986 and bypass about 2000   PAST MEDICAL & SURGICAL HISTORY:  Hyperlipemia      Hypertension      Coronary Artery Disease      Diabetes Mellitus Type II      Stented Coronary Artery  total 5 stents, last stent 5/2019      Neuropathy      Myocardial infarction      Stroke  mild left facial numbness   no other residuals verbalized      History of Cataract Extraction      Hx of CABG      H/O coronary angiogram      S/P coronary artery stent placement  1/6/09          MEDICATIONS  (STANDING):  aspirin enteric coated 81 milliGRAM(s) Oral daily  carvedilol 6.25 milliGRAM(s) Oral every 12 hours  cholecalciferol 1000 Unit(s) Oral daily  dextrose 5%. 1000 milliLiter(s) (50 mL/Hr) IV Continuous <Continuous>  dextrose 5%. 1000 milliLiter(s) (100 mL/Hr) IV Continuous <Continuous>  dextrose 50% Injectable 25 Gram(s) IV Push once  dextrose 50% Injectable 12.5 Gram(s) IV Push once  dextrose 50% Injectable 25 Gram(s) IV Push once  escitalopram 10 milliGRAM(s) Oral daily  gabapentin 400 milliGRAM(s) Oral two times a day  glucagon  Injectable 1 milliGRAM(s) IntraMuscular once  heparin   Injectable 5000 Unit(s) SubCutaneous every 8 hours  hydrALAZINE 10 milliGRAM(s) Oral three times a day  insulin glargine Injectable (LANTUS) 10 Unit(s) SubCutaneous at bedtime  insulin lispro (ADMELOG) corrective regimen sliding scale   SubCutaneous three times a day before meals  insulin lispro (ADMELOG) corrective regimen sliding scale   SubCutaneous at bedtime  isosorbide   dinitrate Tablet (ISORDIL) 10 milliGRAM(s) Oral three times a day  pantoprazole    Tablet 40 milliGRAM(s) Oral before breakfast  polyethylene glycol 3350 17 Gram(s) Oral daily  rosuvastatin 40 milliGRAM(s) Oral at bedtime  ticagrelor 90 milliGRAM(s) Oral two times a day      Allergies    carbamazepine (Other)  Cipro (Unknown)  fluoroquinolone antibiotics (Other)  Tegretol (Unknown)    Intolerances        SOCIAL HISTORY:  Denies alcohol abuse, drug abuse or tobacco usage.     FAMILY HISTORY:      VITALS:  T(C): 36.6 (05-26-22 @ 04:48), Max: 37.2 (05-25-22 @ 13:00)  HR: 82 (05-26-22 @ 04:48) (72 - 85)  BP: 138/61 (05-26-22 @ 04:48) (116/68 - 144/57)  RR: 16 (05-26-22 @ 04:48) (16 - 18)  SpO2: 100% (05-26-22 @ 04:48) (100% - 100%)    REVIEW OF SYSTEMS:  Denies any nausea, vomiting, diarrhea, fever or chills. + fatigue or weakness. All other pertinent systems are reviewed and are negative.    PHYSICAL EXAM:  Constitutional: NAD  HEENT: EOMI  Neck:  No JVD, supple   Respiratory: CTA B/L  Cardiovascular: S1 and S2, RRR  Gastrointestinal: + BS, soft, NT, ND  Extremities: No peripheral edema, + peripheral pulses  Neurological: A/O x 3, CN2-12 intact  Psychiatric: Normal mood, normal affect  : No Moss  Skin: No rashes, C/D/I  Access: Not applicable    I and O's:    05-24 @ 07:01  -  05-25 @ 07:00  --------------------------------------------------------  IN: 120 mL / OUT: 600 mL / NET: -480 mL    05-25 @ 07:01  -  05-26 @ 07:00  --------------------------------------------------------  IN: 940 mL / OUT: 2050 mL / NET: -1110 mL          LABS:                        13.5   7.57  )-----------( 224      ( 26 May 2022 04:38 )             40.5     05-26    135  |  97<L>  |  38<H>  ----------------------------<  244<H>  4.2   |  24  |  2.01<H>    Ca    8.6      26 May 2022 04:38  Phos  3.1     05-26  Mg     2.50     05-26        URINE:      RADIOLOGY & ADDITIONAL STUDIES:    < from: Xray Chest 1 View- PORTABLE-Urgent (05.22.22 @ 01:56) >    ACC: [id number] EXAM:  XR CHEST PORTABLE URGENT 1V                          PROCEDURE DATE:  05/22/2022          INTERPRETATION:  CLINICAL INFORMATION: Chest pain    TECHNIQUE: Frontal radiograph of the chest    COMPARISON: Chest radiograph  2/28/2022    FINDINGS:    The lungs are free of focal consolidations. No pneumothorax. The heart   size not well evaluated on this projection. Status post coronary stent   and sternotomy. No acute osseous findings.    IMPRESSION: No acute pulmonary disease.      --- End of Report ---          ABRAHAM ALSTON MD; Resident Radiologist  This document has been electronically signed.  AMELIA ANGLIN MD; Attending Radiologist  This document has been electronically signed. May 22 2022  7:44AM    < end of copied text >

## 2022-05-26 NOTE — DISCHARGE NOTE NURSING/CASE MANAGEMENT/SOCIAL WORK - PATIENT PORTAL LINK FT
You can access the FollowMyHealth Patient Portal offered by [facility] by registering at the following website: [email or web address]. By joining [employer]’s FollowMyHealth portal, you will also be able to view your health information using other applications (apps) compatible with our system.

## 2022-05-26 NOTE — DISCHARGE NOTE PROVIDER - NSDCCPCAREPLAN_GEN_ALL_CORE_FT
PRINCIPAL DISCHARGE DIAGNOSIS  Diagnosis: Shortness of breath  Assessment and Plan of Treatment: - You came in with shortness of breath and were admitted for further evaulation adn workup   - We found you to have abnormal heart function after several imaging studies and lab tests, including an abnormal stress test, and proceeded with a catheterization of the heart  - You recieved stents for obstructed heart vessels during the catheterization  - Cardiology and nephrology evaluated you while in the hospital and have cleared you fro discharge and to restart your home medications  - Please follow up with your primary care provider in one week for continued monitoring and treatment   - The heart failure clinic was emailed, and will reach out to you regarding a follow up appointment      SECONDARY DISCHARGE DIAGNOSES  Diagnosis: Acute on chronic systolic congestive heart failure  Assessment and Plan of Treatment: - We evaluated your heart with an ultrasound, which found abnormal function of your heart. This was causing you to have an overabundance of fluid in your body, which was treated with medication and resolved  - Please continue to take your cardiac medications as directed  -  Please follow up with your primary care provider in one week for continued monitoring and treatment   - The heart failure clinic was emailed, and will reach out to you regarding a follow up appointment    Diagnosis: Chronic kidney disease, unspecified CKD stage  Assessment and Plan of Treatment: - Please follow up with your primary care provider in one week regarding your kidney function  - Nephrology evaulated you in the hospital and cleared you for cath and restarting your medications    Diagnosis: HTN (hypertension)  Assessment and Plan of Treatment: - Please continue to take your blood pressure medications as directed  - Please follow up with your primary care provider for continued monitoring and treatment    Diagnosis: HLD (hyperlipidemia)  Assessment and Plan of Treatment: -  Please continue to take your cholesterol medications as directed  - Please follow up with your primary care provider for continued monitoring and treatment    Diagnosis: CAD (coronary artery disease)  Assessment and Plan of Treatment: - Please continue to take your cardiac medications as directed  -  Please follow up with your primary care provider in one week for continued monitoring and treatment   - It is very important that you continue to take your aspirin and plavix so your stents do not close    Diagnosis: DM2 (diabetes mellitus, type 2)  Assessment and Plan of Treatment: - Please continue to take your diabetes medications as directed   - Please follow up with your primary care provider for continued monitoring and treatment

## 2022-05-26 NOTE — CONSULT NOTE ADULT - ASSESSMENT
88M with PMH of CAD s/p CABG, s/p stents, recent STEMI (3/2022), HTN, DM2, CKD, PVD s/p RLE stent who presents to the hospital with SOB x4 days      88M with PMH of CAD s/p CABG, s/p stents, recent STEMI (3/2022), HTN, DM2, CKD, PVD s/p RLE stent who presents to the hospital with SOB x4 days  CKD stage 3 b at baseline     1 CVS-Pt seen and examined by cards and needs a cath  No V gram please and will start IVF  2 Renal-High risk for BERENICE given his age and serum creatinine.  However he is at baseline renal function and can not be more optimized.  The outpt  PCP and family/patient are aware of the risk and wish to proceed  Check PTH as outpt   3 Endo-Maximize statin therapy;  Loose blood sugar control given age    Further work up pending above     Sayed Cleo   [phone]

## 2022-05-26 NOTE — DISCHARGE NOTE PROVIDER - HOSPITAL COURSE
88M with PMH of CAD s/p CABG, s/p stents, recent STEMI (3/2022), HTN, DM2, CKD, PVD s/p RLE stent who presents to the hospital with SOB x4 days. States his SOB has gradually worsened. He has associated left-sided/substernal chest pressure w/o radiation. He does report noticing a "nerve pain" of his RUE, which is new. His baseline exercise tolerance is 100 yd and in recent days, he can barely ambulate 10 feet. He denies orthopnea, but does report PND. He states he has been adherent w/ all of his medications. He reports being adherent to an an appropriate heart diet. Since arriving and receiving lasix, he reports improvement of his symptoms. His SOB has resolved. However, he continues to notice a slight substernal pressure and right UE pain. No recent illnesses, no SC, no NV, abd pain, diarrhea, dysuria. Over all doing good and going for cardiac cath.      Acute on chronic systolic congestive heart failure.   - S/P IV Lasix and now Euvolemic  -Continue IV lasix 40 daily. Discharge on his home dose of 20mg PO lasix  -Continue carvedilol, isosorbide, hydralazine.  -Per Dr. Storey (renal) ok to resume ARB on discharge   - Echo 5/23 - EF 61%, mild diastolic dysfunction, normal left ventricular structure and function, mild-mod mitral regurg, mild aortic stenosis  - HF clinic emailed     CAD (coronary artery disease).   - Substernal chest pressure, troponins flat,   - Abnormal Nuclear Stress test on 5/25  - ECHO  - Cardiac Cath 5/27 - received stents  -Continue ASA, Brilinta, Crestor  - Will follow with cardiology as an outpatient     CKD  - Cr remains stable, cleared for cardiac cath from renal perspective  - Will follow with PCP as outpatient for lab check     HTN (hypertension).   · BP meds with hold parameters.     HLD (hyperlipidemia).   ·  Statin.     DM2 (diabetes mellitus, type 2).   - Sugars in acceptable range.  - Restart home regiment on discharge    On 5/27, discussed with  , patient is medically cleared and optimized for discharge today. All medications were reviewed with attending, and sent to mutually agreed upon pharmacy.  Reviewed discharge medications with patient; All new medications requiring new prescription sent to pharmacy of patients choice. Reviewed need for prescription for previous home medications and new prescriptions sent if requested. Patient in agreement and understands.      88M with PMH of CAD s/p CABG, s/p stents, recent STEMI (3/2022), HTN, DM2, CKD, PVD s/p RLE stent who presents to the hospital with SOB x4 days. States his SOB has gradually worsened. He has associated left-sided/substernal chest pressure w/o radiation. He does report noticing a "nerve pain" of his RUE, which is new. His baseline exercise tolerance is 100 yd and in recent days, he can barely ambulate 10 feet. He denies orthopnea, but does report PND. He states he has been adherent w/ all of his medications. He reports being adherent to an an appropriate heart diet. Since arriving and receiving lasix, he reports improvement of his symptoms. His SOB has resolved. However, he continues to notice a slight substernal pressure and right UE pain. No recent illnesses, no SC, no NV, abd pain, diarrhea, dysuria. Over all doing good and going for cardiac cath.      Acute on chronic systolic congestive heart failure.   - S/P IV Lasix and now Euvolemic  -Continue IV lasix 40 daily. Discharge on his home dose of 20mg PO lasix  -Continue carvedilol, isosorbide, hydralazine.  -Per Dr. Storey (renal) ok to resume ARB on discharge , patient will follow up with primary care provider in one week  - Echo 5/23 - EF 61%, mild diastolic dysfunction, normal left ventricular structure and function, mild-mod mitral regurg, mild aortic stenosis  - HF clinic emailed     CAD (coronary artery disease).   - Substernal chest pressure, troponins flat,   - Abnormal Nuclear Stress test on 5/25  - ECHO  - Cardiac Cath 5/27 - received stents  -Continue ASA, Brilinta, Crestor  - Will follow with cardiology as an outpatient     CKD  - Cr remains stable, cleared for cardiac cath from renal perspective  - Will follow with PCP as outpatient for lab check     HTN (hypertension).   · BP meds with hold parameters.     HLD (hyperlipidemia).   ·  Statin.     DM2 (diabetes mellitus, type 2).   - Sugars in acceptable range.  - Restart home regiment on discharge    On 5/27, discussed with  , patient is medically cleared and optimized for discharge today. All medications were reviewed with attending, and sent to mutually agreed upon pharmacy.  Reviewed discharge medications with patient; All new medications requiring new prescription sent to pharmacy of patients choice. Reviewed need for prescription for previous home medications and new prescriptions sent if requested. Patient in agreement and understands. Discussed with Social Work and Case management. Patient to have home aid reinstated tomorrow.

## 2022-05-26 NOTE — CONSULT NOTE ADULT - ASSESSMENT
88M with PMH of CAD s/p CABG, s/p stents, recent STEMI (3/2022), HTN, DM2, CKD, PVD s/p RLE stent who presents to the hospital with SOB x4 days. States his SOB has gradually worsened. He has associated left-sided/substernal chest pressure w/o radiation. He does report noticing a "nerve pain" of his RUE, which is new. His baseline exercise tolerance is 100 yd and in recent days, he can barely ambulate 10 feet. He denies orthopnea, but does report PND. He states he has been adherent w/ all of his medications. He reports being adherent to an an appropriate heart diet. Since arriving and receiving lasix, he reports improvement of his symptoms. His SOB has resolved. However, he continues to notice a slight substernal pressure and right UE pain. No recent illnesses, no SC, no NV, abd pain, diarrhea, dysuria. Over all doing good and going for cardiac cath.

## 2022-05-26 NOTE — PROGRESS NOTE ADULT - SUBJECTIVE AND OBJECTIVE BOX
PATIENT SEEN AND EXAMINED ON :- 5/26/22  DATE OF SERVICE:5/26/22             Interim events noted,Labs ,Radiological studies and Cardiology tests reviewed .     HOSPITAL COURSE: HPI:  88M with PMH of CAD s/p CABG, s/p stents, recent STEMI (3/2022), HTN, DM2, CKD, PVD s/p RLE stent who presents to the hospital with SOB x4 days. States his SOB has gradually worsened. He has associated left-sided/substernal chest pressure w/o radiation. He does report noticing a "nerve pain" of his RUE, which is new. His baseline exercise tolerance is 100 yd and in recent days, he can barely ambulate 10 feet. He denies orthopnea, but does report PND. He states he has been adherent w/ all of his medications. He reports being adherent to an an appropriate heart diet. Since arriving and receiving lasix, he reports improvement of his symptoms. His SOB has resolved. However, he continues to notice a slight substernal pressure and right UE pain. No recent illnesses, no SC, no NV, abd pain, diarrhea, dysuria. He has voided a good amount since receiving IV lasix.    ED Course: 120/43, 85, 18, 99F, 98% RA. Received lasix, ASA (22 May 2022 09:32)      INTERIM EVENTS:Patient seen at bedside ,interim events noted.      PMH -reviewed admission note, no change since admission  HEART FAILURE: Acute[ ]Chronic[ ] Systolic[ ] Diastolic[ ] Combined Systolic and Diastolic[ ]  CAD[ ] CABG[ ] PCI[ ]  DEVICES[ ] PPM[ ] ICD[ ] ILR[ ]  ATRIAL FIBRILLATION[ ] Paroxysmal[ ] Permanent[ ]  MABLE[ ] CKD1[ ] CKD2[ ] CKD3[ ] CKD4[ ] ESRD[ ]  COPD[ ] HTN[ ]   DM[ ] Type1[ ] Type 2[ ]   CVA[ ] Paresis[ ]    AMBULATION: Assisted[ ] Cane/walker[ ] Independent[ ]    MEDICATIONS  (STANDING):  aspirin enteric coated 81 milliGRAM(s) Oral daily  carvedilol 6.25 milliGRAM(s) Oral every 12 hours  cholecalciferol 1000 Unit(s) Oral daily  dextrose 5%. 1000 milliLiter(s) (50 mL/Hr) IV Continuous <Continuous>  dextrose 5%. 1000 milliLiter(s) (100 mL/Hr) IV Continuous <Continuous>  dextrose 50% Injectable 25 Gram(s) IV Push once  dextrose 50% Injectable 12.5 Gram(s) IV Push once  dextrose 50% Injectable 25 Gram(s) IV Push once  escitalopram 10 milliGRAM(s) Oral daily  gabapentin 400 milliGRAM(s) Oral two times a day  glucagon  Injectable 1 milliGRAM(s) IntraMuscular once  heparin   Injectable 5000 Unit(s) SubCutaneous every 8 hours  hydrALAZINE 10 milliGRAM(s) Oral three times a day  insulin glargine Injectable (LANTUS) 10 Unit(s) SubCutaneous at bedtime  insulin lispro (ADMELOG) corrective regimen sliding scale   SubCutaneous three times a day before meals  insulin lispro (ADMELOG) corrective regimen sliding scale   SubCutaneous at bedtime  isosorbide   dinitrate Tablet (ISORDIL) 10 milliGRAM(s) Oral three times a day  pantoprazole    Tablet 40 milliGRAM(s) Oral before breakfast  polyethylene glycol 3350 17 Gram(s) Oral daily  rosuvastatin 40 milliGRAM(s) Oral at bedtime  sodium chloride 0.9%. 1000 milliLiter(s) (75 mL/Hr) IV Continuous <Continuous>  sodium chloride 0.9%. 1000 milliLiter(s) (70 mL/Hr) IV Continuous <Continuous>  ticagrelor 90 milliGRAM(s) Oral two times a day    MEDICATIONS  (PRN):  dextrose Oral Gel 15 Gram(s) Oral once PRN Blood Glucose LESS THAN 70 milliGRAM(s)/deciliter            REVIEW OF SYSTEMS:  Constitutional: [ ] fever, [ ]weight loss,  [ ]fatigue  Eyes: [ ] visual changes  Respiratory: [ ]shortness of breath;  [ ] cough, [ ]wheezing, [ ]chills, [ ]hemoptysis  Cardiovascular: [ ] chest pain, [ ]palpitations, [ ]dizziness,  [ ]leg swelling[ ]orthopnea[ ]PND  Gastrointestinal: [ ] abdominal pain, [ ]nausea, [ ]vomiting,  [ ]diarrhea [ ]Constipation [ ]Melena  Genitourinary: [ ] dysuria, [ ] hematuria [ ]Moss  Neurologic: [ ] headaches [ ] tremors[ ]weakness [ ]Paralysis Right[ ] Left[ ]  Skin: [ ] itching, [ ]burning, [ ] rashes  Endocrine: [ ] heat or cold intolerance  Musculoskeletal: [ ] joint pain or swelling; [ ] muscle, back, or extremity pain  Psychiatric: [ ] depression, [ ]anxiety, [ ]mood swings, or [ ]difficulty sleeping  Hematologic: [ ] easy bruising, [ ] bleeding gums    [ ] All remaining systems negative except as per above.   [ ]Unable to obtain.  [x] No change in ROS since admission      Vital Signs Last 24 Hrs  T(C): 36.9 (26 May 2022 13:40), Max: 36.9 (26 May 2022 13:40)  T(F): 98.5 (26 May 2022 13:40), Max: 98.5 (26 May 2022 13:40)  HR: 72 (26 May 2022 13:40) (72 - 82)  BP: 148/72 (26 May 2022 13:40) (138/61 - 148/72)  BP(mean): --  RR: 16 (26 May 2022 13:40) (16 - 18)  SpO2: 100% (26 May 2022 13:40) (100% - 100%)  I&O's Summary    25 May 2022 07:01  -  26 May 2022 07:00  --------------------------------------------------------  IN: 940 mL / OUT: 2050 mL / NET: -1110 mL    26 May 2022 07:01  -  26 May 2022 20:34  --------------------------------------------------------  IN: 0 mL / OUT: 250 mL / NET: -250 mL        PHYSICAL EXAM:  General: No acute distress BMI-  HEENT: EOMI, PERRL  Neck: Supple, [ ] JVD  Lungs: Equal air entry bilaterally; [ ] rales [ ] wheezing [ ] rhonchi  Heart: Regular rate and rhythm; [x ] murmur   2/6 [ x] systolic [ ] diastolic [ ] radiation[ ] rubs [ ]  gallops  Abdomen: Nontender, bowel sounds present  Extremities: No clubbing, cyanosis, [ ] edema [ ]Pulses  equal and intact  Nervous system:  Alert & Oriented X3, no focal deficits  Psychiatric: Normal affect  Skin: No rashes or lesions    LABS:  05-26    135  |  97<L>  |  38<H>  ----------------------------<  244<H>  4.2   |  24  |  2.01<H>    Ca    8.6      26 May 2022 04:38  Phos  3.1     05-26  Mg     2.50     05-26      Creatinine Trend: 2.01<--, 2.15<--, 1.92<--, 1.84<--, 1.82<--                        13.5   7.57  )-----------( 224      ( 26 May 2022 04:38 )             40.5

## 2022-05-26 NOTE — CONSULT NOTE ADULT - SUBJECTIVE AND OBJECTIVE BOX
Patient is a 88y old  Male who presents with a chief complaint of SOB/CP       HPI:  88M with PMH of CAD s/p CABG, s/p stents, recent STEMI (3/2022), HTN, DM2, CKD, PVD s/p RLE stent who presents to the hospital with SOB x4 days. States his SOB has gradually worsened. He has associated left-sided/substernal chest pressure w/o radiation. He does report noticing a "nerve pain" of his RUE, which is new. His baseline exercise tolerance is 100 yd and in recent days, he can barely ambulate 10 feet. He denies orthopnea, but does report PND. He states he has been adherent w/ all of his medications. He reports being adherent to an an appropriate heart diet. Since arriving and receiving lasix, he reports improvement of his symptoms. His SOB has resolved. However, he continues to notice a slight substernal pressure and right UE pain. No recent illnesses, no SC, no NV, abd pain, diarrhea, dysuria. Over all doing good and going for cardiac cath.     ED Course: 120/43, 85, 18, 99F, 98% RA. Received lasix, ASA (22 May 2022 09:32)      PAST MEDICAL & SURGICAL HISTORY:  Hyperlipemia  Hypertension  Coronary Artery Disease  Diabetes Mellitus Type II  Stented Coronary Artery  total 5 stents, last stent 5/2019  Neuropathy  Myocardial infarctio  Stroke  mild left facial numbness   no other residuals verbalized      History of Cataract Extraction  Hx of CAB    H/O coronary angiogram  S/P coronary artery stent placement  1/6/09      Social History: NO smoking etc.     FAMILY HISTORY:      Allergies    carbamazepine (Other)  Cipro (Unknown)  fluoroquinolone antibiotics (Other)  Tegretol (Unknown)    Intolerances        REVIEW OF SYSTEMS:    CONSTITUTIONAL: No fever, weight loss, or fatigue  EYES: No eye pain, visual disturbances, or discharge  RESPIRATORY: No cough, wheezing, chills or hemoptysis; No shortness of breath  CARDIOVASCULAR: No chest pain, palpitations, dizziness, or leg swelling  GASTROINTESTINAL: No abdominal or epigastric pain. No nausea, vomiting, or hematemesis; No diarrhea or constipation. No melena or hematochezia.  GENITOURINARY: No dysuria, frequency, hematuria, or incontinence  NEUROLOGICAL: No headaches, memory loss, loss of strength, numbness, or tremors  SKIN: No itching, burning, rashes, or lesions   ENDOCRINE: No heat or cold intolerance; No hair loss  MUSCULOSKELETAL: No joint pain or swelling; No muscle, back, or extremity pain  PSYCHIATRIC: No depression, anxiety, mood swings, or difficulty sleeping      MEDICATIONS  (STANDING):  aspirin enteric coated 81 milliGRAM(s) Oral daily  carvedilol 6.25 milliGRAM(s) Oral every 12 hours  cholecalciferol 1000 Unit(s) Oral daily  dextrose 5%. 1000 milliLiter(s) (50 mL/Hr) IV Continuous <Continuous>  dextrose 5%. 1000 milliLiter(s) (100 mL/Hr) IV Continuous <Continuous>  dextrose 50% Injectable 25 Gram(s) IV Push once  dextrose 50% Injectable 12.5 Gram(s) IV Push once  dextrose 50% Injectable 25 Gram(s) IV Push once  escitalopram 10 milliGRAM(s) Oral daily  gabapentin 400 milliGRAM(s) Oral two times a day  glucagon  Injectable 1 milliGRAM(s) IntraMuscular once  heparin   Injectable 5000 Unit(s) SubCutaneous every 8 hours  hydrALAZINE 10 milliGRAM(s) Oral three times a day  insulin glargine Injectable (LANTUS) 10 Unit(s) SubCutaneous at bedtime  insulin lispro (ADMELOG) corrective regimen sliding scale   SubCutaneous three times a day before meals  insulin lispro (ADMELOG) corrective regimen sliding scale   SubCutaneous at bedtime  isosorbide   dinitrate Tablet (ISORDIL) 10 milliGRAM(s) Oral three times a day  pantoprazole    Tablet 40 milliGRAM(s) Oral before breakfast  polyethylene glycol 3350 17 Gram(s) Oral daily  rosuvastatin 40 milliGRAM(s) Oral at bedtime  sodium chloride 0.9%. 1000 milliLiter(s) (70 mL/Hr) IV Continuous <Continuous>  ticagrelor 90 milliGRAM(s) Oral two times a day    MEDICATIONS  (PRN):  dextrose Oral Gel 15 Gram(s) Oral once PRN Blood Glucose LESS THAN 70 milliGRAM(s)/deciliter      Vital Signs Last 24 Hrs  T(C): 36.9 (26 May 2022 13:40), Max: 36.9 (26 May 2022 13:40)  T(F): 98.5 (26 May 2022 13:40), Max: 98.5 (26 May 2022 13:40)  HR: 72 (26 May 2022 13:40) (72 - 85)  BP: 148/72 (26 May 2022 13:40) (134/62 - 148/72)  BP(mean): --  RR: 16 (26 May 2022 13:40) (16 - 18)  SpO2: 100% (26 May 2022 13:40) (100% - 100%)    PHYSICAL EXAM:    GENERAL: NAD, well-groomed, well-developed  HEAD:  Atraumatic, Normocephalic  NECK: Supple, No JVD, Normal thyroid  NERVOUS SYSTEM:  Alert & Oriented X3, No new  focal sign .  CHEST/LUNG: Air entry good bilaterally; No rales, rhonchi, wheezing, or rubs  HEART: Regular rate and rhythm; No murmurs, rubs, or gallops  ABDOMEN: Soft, Nontender, Nondistended; Bowel sounds present  EXTREMITIES:  2+ Peripheral Pulses, No clubbing, cyanosis, or edema      LABS:                        13.5   7.57  )-----------( 224      ( 26 May 2022 04:38 )             40.5     05-26    135  |  97<L>  |  38<H>  ----------------------------<  244<H>  4.2   |  24  |  2.01<H>    Ca    8.6      26 May 2022 04:38  Phos  3.1     05-26  Mg     2.50     05-26              RADIOLOGY & ADDITIONAL STUDIES:

## 2022-05-26 NOTE — DISCHARGE NOTE PROVIDER - NSDCFUADDAPPT_GEN_ALL_CORE_FT
Please follow up with your primary care provider in one week for follow up for continued monitoring and treatment. Your Micardis was held during your hospitalization and can be resumed when you return home. Please ask your PCP to follow up your lab work to assess kidney function after resuming.     Heart failure clinic was emailed. They will follow up with you about scheduling an appointment with them for further monitoring and treatment.

## 2022-05-26 NOTE — DISCHARGE NOTE NURSING/CASE MANAGEMENT/SOCIAL WORK - NSDCPEFALRISK_GEN_ALL_CORE
For information on Fall & Injury Prevention, visit: [email or web address] OR  [email or web address] OR  https://www.cdc.gov/steadi/patient.html

## 2022-05-27 VITALS
OXYGEN SATURATION: 99 % | SYSTOLIC BLOOD PRESSURE: 139 MMHG | DIASTOLIC BLOOD PRESSURE: 68 MMHG | HEART RATE: 72 BPM | TEMPERATURE: 98 F | RESPIRATION RATE: 17 BRPM

## 2022-05-27 LAB
ANION GAP SERPL CALC-SCNC: 10 MMOL/L — SIGNIFICANT CHANGE UP (ref 7–14)
BUN SERPL-MCNC: 32 MG/DL — HIGH (ref 7–23)
CALCIUM SERPL-MCNC: 8.5 MG/DL — SIGNIFICANT CHANGE UP (ref 8.4–10.5)
CHLORIDE SERPL-SCNC: 102 MMOL/L — SIGNIFICANT CHANGE UP (ref 98–107)
CO2 SERPL-SCNC: 24 MMOL/L — SIGNIFICANT CHANGE UP (ref 22–31)
CREAT SERPL-MCNC: 1.74 MG/DL — HIGH (ref 0.5–1.3)
EGFR: 37 ML/MIN/1.73M2 — LOW
GLUCOSE BLDC GLUCOMTR-MCNC: 144 MG/DL — HIGH (ref 70–99)
GLUCOSE BLDC GLUCOMTR-MCNC: 220 MG/DL — HIGH (ref 70–99)
GLUCOSE SERPL-MCNC: 297 MG/DL — HIGH (ref 70–99)
HCT VFR BLD CALC: 37.5 % — LOW (ref 39–50)
HGB BLD-MCNC: 12.6 G/DL — LOW (ref 13–17)
MAGNESIUM SERPL-MCNC: 2.6 MG/DL — SIGNIFICANT CHANGE UP (ref 1.6–2.6)
MCHC RBC-ENTMCNC: 29.8 PG — SIGNIFICANT CHANGE UP (ref 27–34)
MCHC RBC-ENTMCNC: 33.6 GM/DL — SIGNIFICANT CHANGE UP (ref 32–36)
MCV RBC AUTO: 88.7 FL — SIGNIFICANT CHANGE UP (ref 80–100)
NRBC # BLD: 0 /100 WBCS — SIGNIFICANT CHANGE UP
NRBC # FLD: 0 K/UL — SIGNIFICANT CHANGE UP
PHOSPHATE SERPL-MCNC: 2.7 MG/DL — SIGNIFICANT CHANGE UP (ref 2.5–4.5)
PLATELET # BLD AUTO: 189 K/UL — SIGNIFICANT CHANGE UP (ref 150–400)
POTASSIUM SERPL-MCNC: 4.4 MMOL/L — SIGNIFICANT CHANGE UP (ref 3.5–5.3)
POTASSIUM SERPL-SCNC: 4.4 MMOL/L — SIGNIFICANT CHANGE UP (ref 3.5–5.3)
RBC # BLD: 4.23 M/UL — SIGNIFICANT CHANGE UP (ref 4.2–5.8)
RBC # FLD: 13.2 % — SIGNIFICANT CHANGE UP (ref 10.3–14.5)
SODIUM SERPL-SCNC: 136 MMOL/L — SIGNIFICANT CHANGE UP (ref 135–145)
WBC # BLD: 7.67 K/UL — SIGNIFICANT CHANGE UP (ref 3.8–10.5)
WBC # FLD AUTO: 7.67 K/UL — SIGNIFICANT CHANGE UP (ref 3.8–10.5)

## 2022-05-27 RX ORDER — ACETAMINOPHEN 500 MG
2 TABLET ORAL
Qty: 0 | Refills: 0 | DISCHARGE
Start: 2022-05-27

## 2022-05-27 RX ADMIN — Medication 650 MILLIGRAM(S): at 01:04

## 2022-05-27 RX ADMIN — Medication 1000 UNIT(S): at 12:42

## 2022-05-27 RX ADMIN — ISOSORBIDE DINITRATE 10 MILLIGRAM(S): 5 TABLET ORAL at 12:41

## 2022-05-27 RX ADMIN — Medication 10 MILLIGRAM(S): at 05:59

## 2022-05-27 RX ADMIN — Medication 81 MILLIGRAM(S): at 12:42

## 2022-05-27 RX ADMIN — ISOSORBIDE DINITRATE 10 MILLIGRAM(S): 5 TABLET ORAL at 00:05

## 2022-05-27 RX ADMIN — PANTOPRAZOLE SODIUM 40 MILLIGRAM(S): 20 TABLET, DELAYED RELEASE ORAL at 06:04

## 2022-05-27 RX ADMIN — ISOSORBIDE DINITRATE 10 MILLIGRAM(S): 5 TABLET ORAL at 05:59

## 2022-05-27 RX ADMIN — CARVEDILOL PHOSPHATE 6.25 MILLIGRAM(S): 80 CAPSULE, EXTENDED RELEASE ORAL at 00:06

## 2022-05-27 RX ADMIN — Medication 4: at 09:15

## 2022-05-27 RX ADMIN — HEPARIN SODIUM 5000 UNIT(S): 5000 INJECTION INTRAVENOUS; SUBCUTANEOUS at 05:58

## 2022-05-27 RX ADMIN — Medication 10 MILLIGRAM(S): at 12:43

## 2022-05-27 RX ADMIN — HEPARIN SODIUM 5000 UNIT(S): 5000 INJECTION INTRAVENOUS; SUBCUTANEOUS at 12:43

## 2022-05-27 RX ADMIN — ROSUVASTATIN CALCIUM 40 MILLIGRAM(S): 5 TABLET ORAL at 00:06

## 2022-05-27 RX ADMIN — TICAGRELOR 90 MILLIGRAM(S): 90 TABLET ORAL at 05:59

## 2022-05-27 RX ADMIN — HEPARIN SODIUM 5000 UNIT(S): 5000 INJECTION INTRAVENOUS; SUBCUTANEOUS at 00:06

## 2022-05-27 RX ADMIN — Medication 10 MILLIGRAM(S): at 00:05

## 2022-05-27 RX ADMIN — TICAGRELOR 90 MILLIGRAM(S): 90 TABLET ORAL at 00:06

## 2022-05-27 RX ADMIN — Medication 650 MILLIGRAM(S): at 00:04

## 2022-05-27 RX ADMIN — POLYETHYLENE GLYCOL 3350 17 GRAM(S): 17 POWDER, FOR SOLUTION ORAL at 12:42

## 2022-05-27 RX ADMIN — GABAPENTIN 400 MILLIGRAM(S): 400 CAPSULE ORAL at 05:58

## 2022-05-27 RX ADMIN — CARVEDILOL PHOSPHATE 6.25 MILLIGRAM(S): 80 CAPSULE, EXTENDED RELEASE ORAL at 06:00

## 2022-05-27 RX ADMIN — GABAPENTIN 400 MILLIGRAM(S): 400 CAPSULE ORAL at 00:07

## 2022-05-27 RX ADMIN — ESCITALOPRAM OXALATE 10 MILLIGRAM(S): 10 TABLET, FILM COATED ORAL at 12:43

## 2022-05-27 NOTE — PROGRESS NOTE ADULT - SUBJECTIVE AND OBJECTIVE BOX
NEPHROLOGY     Patient seen and examined.    MEDICATIONS  (STANDING):  aspirin enteric coated 81 milliGRAM(s) Oral daily  carvedilol 6.25 milliGRAM(s) Oral every 12 hours  cholecalciferol 1000 Unit(s) Oral daily  dextrose 5%. 1000 milliLiter(s) (100 mL/Hr) IV Continuous <Continuous>  dextrose 5%. 1000 milliLiter(s) (50 mL/Hr) IV Continuous <Continuous>  dextrose 50% Injectable 25 Gram(s) IV Push once  dextrose 50% Injectable 12.5 Gram(s) IV Push once  dextrose 50% Injectable 25 Gram(s) IV Push once  escitalopram 10 milliGRAM(s) Oral daily  gabapentin 400 milliGRAM(s) Oral two times a day  glucagon  Injectable 1 milliGRAM(s) IntraMuscular once  heparin   Injectable 5000 Unit(s) SubCutaneous every 8 hours  hydrALAZINE 10 milliGRAM(s) Oral three times a day  insulin glargine Injectable (LANTUS) 10 Unit(s) SubCutaneous at bedtime  insulin lispro (ADMELOG) corrective regimen sliding scale   SubCutaneous three times a day before meals  insulin lispro (ADMELOG) corrective regimen sliding scale   SubCutaneous at bedtime  isosorbide   dinitrate Tablet (ISORDIL) 10 milliGRAM(s) Oral three times a day  pantoprazole    Tablet 40 milliGRAM(s) Oral before breakfast  polyethylene glycol 3350 17 Gram(s) Oral daily  rosuvastatin 40 milliGRAM(s) Oral at bedtime  sodium chloride 0.9%. 1000 milliLiter(s) (75 mL/Hr) IV Continuous <Continuous>  sodium chloride 0.9%. 1000 milliLiter(s) (70 mL/Hr) IV Continuous <Continuous>  ticagrelor 90 milliGRAM(s) Oral two times a day    VITALS:  T(C): , Max: 36.9 (05-26-22 @ 13:40)  T(F): , Max: 98.5 (05-26-22 @ 13:40)  HR: 74 (05-27-22 @ 05:56)  BP: 138/60 (05-27-22 @ 05:56)  RR: 16 (05-27-22 @ 05:56)  SpO2: 99% (05-27-22 @ 05:56)    I and O's:    05-26 @ 07:01  -  05-27 @ 07:00  --------------------------------------------------------  IN: 0 mL / OUT: 250 mL / NET: -250 mL    PHYSICAL EXAM:  Constitutional: NAD  HEENT: EOMI  Neck:  No JVD, supple   Respiratory: CTA B/L  Cardiovascular: S1 and S2, RRR  Gastrointestinal: + BS, soft, NT, ND  Extremities: No peripheral edema, + peripheral pulses  Neurological: A/O x 3, CN2-12 intact  Psychiatric: Normal mood, normal affect  : No Moss  Skin: No rashes, C/D/I    LABS:                        12.6   7.67  )-----------( 189      ( 27 May 2022 05:54 )             37.5     05-27    136  |  102  |  32<H>  ----------------------------<  297<H>  4.4   |  24  |  1.74<H>    Ca    8.5      27 May 2022 05:54  Phos  2.7     05-27  Mg     2.60     05-27   NEPHROLOGY     Patient seen and examined sitting on bed with family at bedside. Pt reports feeling well, no complaints of pain, no sob, in no acute distress.     MEDICATIONS  (STANDING):  aspirin enteric coated 81 milliGRAM(s) Oral daily  carvedilol 6.25 milliGRAM(s) Oral every 12 hours  cholecalciferol 1000 Unit(s) Oral daily  dextrose 5%. 1000 milliLiter(s) (100 mL/Hr) IV Continuous <Continuous>  dextrose 5%. 1000 milliLiter(s) (50 mL/Hr) IV Continuous <Continuous>  dextrose 50% Injectable 25 Gram(s) IV Push once  dextrose 50% Injectable 12.5 Gram(s) IV Push once  dextrose 50% Injectable 25 Gram(s) IV Push once  escitalopram 10 milliGRAM(s) Oral daily  gabapentin 400 milliGRAM(s) Oral two times a day  glucagon  Injectable 1 milliGRAM(s) IntraMuscular once  heparin   Injectable 5000 Unit(s) SubCutaneous every 8 hours  hydrALAZINE 10 milliGRAM(s) Oral three times a day  insulin glargine Injectable (LANTUS) 10 Unit(s) SubCutaneous at bedtime  insulin lispro (ADMELOG) corrective regimen sliding scale   SubCutaneous three times a day before meals  insulin lispro (ADMELOG) corrective regimen sliding scale   SubCutaneous at bedtime  isosorbide   dinitrate Tablet (ISORDIL) 10 milliGRAM(s) Oral three times a day  pantoprazole    Tablet 40 milliGRAM(s) Oral before breakfast  polyethylene glycol 3350 17 Gram(s) Oral daily  rosuvastatin 40 milliGRAM(s) Oral at bedtime  sodium chloride 0.9%. 1000 milliLiter(s) (75 mL/Hr) IV Continuous <Continuous>  sodium chloride 0.9%. 1000 milliLiter(s) (70 mL/Hr) IV Continuous <Continuous>  ticagrelor 90 milliGRAM(s) Oral two times a day    VITALS:  T(C): , Max: 36.9 (05-26-22 @ 13:40)  T(F): , Max: 98.5 (05-26-22 @ 13:40)  HR: 74 (05-27-22 @ 05:56)  BP: 138/60 (05-27-22 @ 05:56)  RR: 16 (05-27-22 @ 05:56)  SpO2: 99% (05-27-22 @ 05:56)    I and O's:    05-26 @ 07:01  -  05-27 @ 07:00  --------------------------------------------------------  IN: 0 mL / OUT: 250 mL / NET: -250 mL    PHYSICAL EXAM:  Constitutional: NAD  HEENT: EOMI  Neck:  No JVD, supple   Respiratory: CTA B/L  Cardiovascular: S1 and S2, RRR  Gastrointestinal: + BS, soft, NT, ND  Extremities: No peripheral edema, + peripheral pulses  Neurological: A/O x 3, CN2-12 intact  Psychiatric: Normal mood, normal affect  : No Moss  Skin: No rashes, C/D/I    LABS:                        12.6   7.67  )-----------( 189      ( 27 May 2022 05:54 )             37.5     05-27    136  |  102  |  32<H>  ----------------------------<  297<H>  4.4   |  24  |  1.74<H>    Ca    8.5      27 May 2022 05:54  Phos  2.7     05-27  Mg     2.60     05-27   NEPHROLOGY     Patient seen and examined sitting on bed with family at bedside. Pt reports feeling well, no complaints of pain, no sob, in no acute distress.     MEDICATIONS  (STANDING):  aspirin enteric coated 81 milliGRAM(s) Oral daily  carvedilol 6.25 milliGRAM(s) Oral every 12 hours  cholecalciferol 1000 Unit(s) Oral daily  dextrose 5%. 1000 milliLiter(s) (100 mL/Hr) IV Continuous <Continuous>  dextrose 5%. 1000 milliLiter(s) (50 mL/Hr) IV Continuous <Continuous>  dextrose 50% Injectable 25 Gram(s) IV Push once  dextrose 50% Injectable 12.5 Gram(s) IV Push once  dextrose 50% Injectable 25 Gram(s) IV Push once  escitalopram 10 milliGRAM(s) Oral daily  gabapentin 400 milliGRAM(s) Oral two times a day  glucagon  Injectable 1 milliGRAM(s) IntraMuscular once  heparin   Injectable 5000 Unit(s) SubCutaneous every 8 hours  hydrALAZINE 10 milliGRAM(s) Oral three times a day  insulin glargine Injectable (LANTUS) 10 Unit(s) SubCutaneous at bedtime  insulin lispro (ADMELOG) corrective regimen sliding scale   SubCutaneous three times a day before meals  insulin lispro (ADMELOG) corrective regimen sliding scale   SubCutaneous at bedtime  isosorbide   dinitrate Tablet (ISORDIL) 10 milliGRAM(s) Oral three times a day  pantoprazole    Tablet 40 milliGRAM(s) Oral before breakfast  polyethylene glycol 3350 17 Gram(s) Oral daily  rosuvastatin 40 milliGRAM(s) Oral at bedtime  sodium chloride 0.9%. 1000 milliLiter(s) (75 mL/Hr) IV Continuous <Continuous>  sodium chloride 0.9%. 1000 milliLiter(s) (70 mL/Hr) IV Continuous <Continuous>  ticagrelor 90 milliGRAM(s) Oral two times a day    VITALS:  T(C): , Max: 36.9 (05-26-22 @ 13:40)  T(F): , Max: 98.5 (05-26-22 @ 13:40)  HR: 74 (05-27-22 @ 05:56)  BP: 138/60 (05-27-22 @ 05:56)  RR: 16 (05-27-22 @ 05:56)  SpO2: 99% (05-27-22 @ 05:56)    I and O's:    05-26 @ 07:01  -  05-27 @ 07:00  --------------------------------------------------------  IN: 0 mL / OUT: 250 mL / NET: -250 mL    PHYSICAL EXAM:  Constitutional: NAD  HEENT: EOMI  Neck:  No JVD, supple   Respiratory: CTA B/L  Cardiovascular: S1 and S2, RRR  Gastrointestinal: + BS, soft, NT, ND  Extremities: No peripheral edema, + peripheral pulses  Neurological: A/O x 3, CN2-12 intact  Psychiatric: Normal mood, normal affect  : No Moss  Skin: No rashes, C/D/I.    LABS:                        12.6   7.67  )-----------( 189      ( 27 May 2022 05:54 )             37.5     05-27    136  |  102  |  32<H>  ----------------------------<  297<H>  4.4   |  24  |  1.74<H>    Ca    8.5      27 May 2022 05:54  Phos  2.7     05-27  Mg     2.60     05-27

## 2022-05-27 NOTE — CHART NOTE - NSCHARTNOTEFT_GEN_A_CORE
Goals of care conversation:  Discussed with patient and patient's sons present at bedside. Reviewed discharge information and medication, along with follow up instructions. Patient has a healthcare proxy, which is one of his sons. Patient and family members have been having an ongoing goals of care conversation with their outpatient primary care provider. Family wishes to discuss amongst themselves with the patient further before making a decision regarding code status. Patient and family will follow up with primary care provider within 1-2 weeks and will continue conversation at that time.     Barrera Osorio PA-C  Department of Medicine  Pager #[phone]
Right Femoral Sheath removed at the bedside. Direct pressure held at the site until hemostasis achieved. Patient tolerated removal well. No hematoma or bleeding noted at this time. Site covered with gauze and tegaderm. Instructions given to patient to continue with bedrest. Discussed with PA and bedside RN to continue to monitor.
SHAIK DONOHUE   89yo M s/p cardiac cath via Rt femoral access.  Site is stable with no hematoma, active bleeding, or swelling. Dressing is clean, dry, & intact. DP pulse is palpable. Patient denies pain, numbness, tingling, CP, or SOB. VSS. Will continue to monitor.     T(C): 36.9 (05-26-22 @ 13:40), Max: 36.9 (05-26-22 @ 13:40)  HR: 72 (05-26-22 @ 13:40) (72 - 82)  BP: 148/72 (05-26-22 @ 13:40) (138/61 - 148/72)  RR: 16 (05-26-22 @ 13:40) (16 - 16)  SpO2: 100% (05-26-22 @ 13:40) (100% - 100%)    Cesar Briones PA-C  Department of Medicine - Night Coverage  [facility]  In House Pager [phone]

## 2022-05-27 NOTE — PROGRESS NOTE ADULT - PROBLEM SELECTOR PLAN 2
Continues to have substernal chest pressure. Trops flat. Elevation could be 2' advanced CKD. However, continues to have s  -Continue ASA, Brilinta, Crestor.  - NST abnormal  - Plan Cardiac Cath 5/26 AM   -cont telemetry.
Continues to have substernal chest pressure. Trops flat. Elevation could be 2' advanced CKD. However, continues to have s  -Continue ASA, Brilinta, Crestor.  - NST abnormal  - Plan Cardiac Cath 5/26
Continues to have substernal chest pressure. Trops flat. Elevation could be 2' advanced CKD. However, continues to have s  -Continue ASA, Brilinta, Crestor.  - NST abnormal  - Plan Cardiac Cath 5/26 AM   -cont telemetry.
Continues to have substernal chest pressure. Trops flat. Elevation could be 2' advanced CKD. However, continues to have sx.  -Keep NPO for now. Await cardio recs re: need to pursue cath.  -Continue ASA, Brilinta, Crestor.  -Repeat EKG is progress to eval for dynamic changes.  -Monitor on telemetry.
Continues to have substernal chest pressure. Trops flat. Elevation could be 2' advanced CKD. However, continues to have sx.  -Keep NPO for now. Await cardio recs re: need to pursue cath.  -Continue ASA, Brilinta, Crestor.  -Repeat EKG is progress to eval for dynamic changes.  -Monitor on telemetry.

## 2022-05-27 NOTE — PROGRESS NOTE ADULT - ASSESSMENT
ASSESSMENT/PLAN:  88M with PMH of CAD s/p CABG, s/p stents, recent STEMI (3/2022), HTN, DM2, CKD, PVD s/p RLE stent who presents to the hospital with SOB x4 days  CKD stage 3 b at baseline     1 CVS-s/p cardiac cath yesterday:   2 Renal- High risk for BERENICE given his age and serum creatinine; Renal fxn stable s/p IVF yesterday  Check PTH as outpt   3 Endo-Maximize statin therapy;  Loose blood sugar control given age    Faby Cummins NP-C  [facility]  [phone]  ASSESSMENT/PLAN:  88M with PMH of CAD s/p CABG, s/p stents, recent STEMI (3/2022), HTN, DM2, CKD, PVD s/p RLE stent who presents to the hospital with SOB x4 days  CKD stage 3 b at baseline     1 CVS-s/p cardiac cath yesterday:   2 Renal- High risk for BERENICE given his age and serum creatinine; Renal fxn stable s/p IVF yesterday.  Monitor renal function   Check PTH as outpt   3 Endo-Maximize statin therapy;  Loose blood sugar control given age    Faby Cummins NP-C  [facility]  [phone]  ASSESSMENT/PLAN:  88M with PMH of CAD s/p CABG, s/p stents, recent STEMI (3/2022), HTN, DM2, CKD, PVD s/p RLE stent who presents to the hospital with SOB x4 days  CKD stage 3 b at baseline     1 CVS-s/p cardiac cath yesterday:   2 Renal-  Renal fxn stable s/p IVF yesterday.  Monitor renal function in one week p discharge   Check PTH as outpt   3 Endo-Maximize statin therapy;  Loose blood sugar control given age    Faby Cummins NP-C  [facility]  [phone]

## 2022-05-27 NOTE — PROGRESS NOTE ADULT - PROBLEM SELECTOR PLAN 7
Has RLE stent. Continue statin.      DVT ppx- On HSQ.

## 2022-05-27 NOTE — PROGRESS NOTE ADULT - TIME-BASED BILLING (NON-CRITICAL CARE)
Time-based billing (NON-critical care)
Unknown if ever smoked

## 2022-05-27 NOTE — PROGRESS NOTE ADULT - TIME BILLING
- Review of records, telemetry, vital signs and daily labs.   - General and cardiovascular physical examination.  - Generation of cardiovascular treatment plan.  - Coordination of care.      Patient was seen and examined by me on 5/23/22,interim events noted,labs and radiology studies reviewed.  Mahesh Cagle MD,FACC.  [address] [place], [place] [id number].  [phone]
- Review of records, telemetry, vital signs and daily labs.   - General and cardiovascular physical examination.  - Generation of cardiovascular treatment plan.  - Coordination of care.      Patient was seen and examined by me on 5/27/22,interim events noted,labs and radiology studies reviewed.  Mahesh Cagle MD,FACC.  [address] [place], [place] [id number].  [phone]
- Review of records, telemetry, vital signs and daily labs.   - General and cardiovascular physical examination.  - Generation of cardiovascular treatment plan.  - Coordination of care.      Patient was seen and examined by me on 5/26/22,interim events noted,labs and radiology studies reviewed.  Mahesh Cagle MD,FACC.  [address] [place], [place] [id number].  [phone]
- Review of records, telemetry, vital signs and daily labs.   - General and cardiovascular physical examination.  - Generation of cardiovascular treatment plan.  - Coordination of care.      Patient was seen and examined by me on 5/25/22,interim events noted,labs and radiology studies reviewed.  Mahesh Cagle MD,FACC.  [address] [place], [place] [id number].  [phone]
- Review of records, telemetry, vital signs and daily labs.   - General and cardiovascular physical examination.  - Generation of cardiovascular treatment plan.  - Coordination of care.      Patient was seen and examined by me on 5/24/22,interim events noted,labs and radiology studies reviewed.  Mahesh Cagle MD,FACC.  [address] [place], [place] [phone].  [phone]

## 2022-05-27 NOTE — PROGRESS NOTE ADULT - SUBJECTIVE AND OBJECTIVE BOX
PATIENT SEEN AND EXAMINED ON :- 5/27/22  DATE OF SERVICE:  5/27/22           Interim events noted,Labs ,Radiological studies and Cardiology tests reviewed .       HOSPITAL COURSE: HPI:  88M with PMH of CAD s/p CABG, s/p stents, recent STEMI (3/2022), HTN, DM2, CKD, PVD s/p RLE stent who presents to the hospital with SOB x4 days. States his SOB has gradually worsened. He has associated left-sided/substernal chest pressure w/o radiation. He does report noticing a "nerve pain" of his RUE, which is new. His baseline exercise tolerance is 100 yd and in recent days, he can barely ambulate 10 feet. He denies orthopnea, but does report PND. He states he has been adherent w/ all of his medications. He reports being adherent to an an appropriate heart diet. Since arriving and receiving lasix, he reports improvement of his symptoms. His SOB has resolved. However, he continues to notice a slight substernal pressure and right UE pain. No recent illnesses, no SC, no NV, abd pain, diarrhea, dysuria. He has voided a good amount since receiving IV lasix.    ED Course: 120/43, 85, 18, 99F, 98% RA. Received lasix, ASA (22 May 2022 09:32)      INTERIM EVENTS:Patient seen at bedside ,interim events noted.      PMH -reviewed admission note, no change since admission  HEART FAILURE: Acute[ ]Chronic[ ] Systolic[ ] Diastolic[ ] Combined Systolic and Diastolic[ ]  CAD[ ] CABG[ ] PCI[ ]  DEVICES[ ] PPM[ ] ICD[ ] ILR[ ]  ATRIAL FIBRILLATION[ ] Paroxysmal[ ] Permanent[ ]  MABLE[ ] CKD1[ ] CKD2[ ] CKD3[ ] CKD4[ ] ESRD[ ]  COPD[ ] HTN[ ]   DM[ ] Type1[ ] Type 2[ ]   CVA[ ] Paresis[ ]    AMBULATION: Assisted[ ] Cane/walker[ ] Independent[ ]    MEDICATIONS  (STANDING):  aspirin enteric coated 81 milliGRAM(s) Oral daily  carvedilol 6.25 milliGRAM(s) Oral every 12 hours  cholecalciferol 1000 Unit(s) Oral daily  dextrose 5%. 1000 milliLiter(s) (50 mL/Hr) IV Continuous <Continuous>  dextrose 5%. 1000 milliLiter(s) (100 mL/Hr) IV Continuous <Continuous>  dextrose 50% Injectable 25 Gram(s) IV Push once  dextrose 50% Injectable 12.5 Gram(s) IV Push once  dextrose 50% Injectable 25 Gram(s) IV Push once  escitalopram 10 milliGRAM(s) Oral daily  gabapentin 400 milliGRAM(s) Oral two times a day  glucagon  Injectable 1 milliGRAM(s) IntraMuscular once  heparin   Injectable 5000 Unit(s) SubCutaneous every 8 hours  hydrALAZINE 10 milliGRAM(s) Oral three times a day  insulin glargine Injectable (LANTUS) 10 Unit(s) SubCutaneous at bedtime  insulin lispro (ADMELOG) corrective regimen sliding scale   SubCutaneous three times a day before meals  insulin lispro (ADMELOG) corrective regimen sliding scale   SubCutaneous at bedtime  isosorbide   dinitrate Tablet (ISORDIL) 10 milliGRAM(s) Oral three times a day  pantoprazole    Tablet 40 milliGRAM(s) Oral before breakfast  polyethylene glycol 3350 17 Gram(s) Oral daily  rosuvastatin 40 milliGRAM(s) Oral at bedtime  sodium chloride 0.9%. 1000 milliLiter(s) (75 mL/Hr) IV Continuous <Continuous>  sodium chloride 0.9%. 1000 milliLiter(s) (70 mL/Hr) IV Continuous <Continuous>  ticagrelor 90 milliGRAM(s) Oral two times a day    MEDICATIONS  (PRN):  acetaminophen     Tablet .. 650 milliGRAM(s) Oral every 6 hours PRN Mild Pain (1 - 3), Moderate Pain (4 - 6)  dextrose Oral Gel 15 Gram(s) Oral once PRN Blood Glucose LESS THAN 70 milliGRAM(s)/deciliter            REVIEW OF SYSTEMS:  Constitutional: [ ] fever, [ ]weight loss,  [ ]fatigue  Eyes: [ ] visual changes  Respiratory: [ ]shortness of breath;  [ ] cough, [ ]wheezing, [ ]chills, [ ]hemoptysis  Cardiovascular: [ ] chest pain, [ ]palpitations, [ ]dizziness,  [ ]leg swelling[ ]orthopnea[ ]PND  Gastrointestinal: [ ] abdominal pain, [ ]nausea, [ ]vomiting,  [ ]diarrhea [ ]Constipation [ ]Melena  Genitourinary: [ ] dysuria, [ ] hematuria [ ]Moss  Neurologic: [ ] headaches [ ] tremors[ ]weakness [ ]Paralysis Right[ ] Left[ ]  Skin: [ ] itching, [ ]burning, [ ] rashes  Endocrine: [ ] heat or cold intolerance  Musculoskeletal: [ ] joint pain or swelling; [ ] muscle, back, or extremity pain  Psychiatric: [ ] depression, [ ]anxiety, [ ]mood swings, or [ ]difficulty sleeping  Hematologic: [ ] easy bruising, [ ] bleeding gums    [ ] All remaining systems negative except as per above.   [ ]Unable to obtain.  [x] No change in ROS since admission      Vital Signs Last 24 Hrs  T(C): 36.7 (27 May 2022 12:38), Max: 36.7 (27 May 2022 12:38)  T(F): 98 (27 May 2022 12:38), Max: 98 (27 May 2022 12:38)  HR: 72 (27 May 2022 12:38) (72 - 74)  BP: 139/68 (27 May 2022 12:38) (138/60 - 153/74)  BP(mean): --  RR: 17 (27 May 2022 12:38) (16 - 17)  SpO2: 99% (27 May 2022 12:38) (99% - 100%)  I&O's Summary    26 May 2022 07:01  -  27 May 2022 07:00  --------------------------------------------------------  IN: 0 mL / OUT: 250 mL / NET: -250 mL        PHYSICAL EXAM:  General: No acute distress BMI-  HEENT: EOMI, PERRL  Neck: Supple, [ ] JVD  Lungs: Equal air entry bilaterally; [ ] rales [ ] wheezing [ ] rhonchi  Heart: Regular rate and rhythm; [x ] murmur   2/6 [ x] systolic [ ] diastolic [ ] radiation[ ] rubs [ ]  gallops  Abdomen: Nontender, bowel sounds present  Extremities: No clubbing, cyanosis, [ ] edema [ ]Pulses  equal and intact  Nervous system:  Alert & Oriented X3, no focal deficits  Psychiatric: Normal affect  Skin: No rashes or lesions    LABS:  05-27    136  |  102  |  32<H>  ----------------------------<  297<H>  4.4   |  24  |  1.74<H>    Ca    8.5      27 May 2022 05:54  Phos  2.7     05-27  Mg     2.60     05-27      Creatinine Trend: 1.74<--, 2.01<--, 2.15<--, 1.92<--, 1.84<--, 1.82<--                        12.6   7.67  )-----------( 189      ( 27 May 2022 05:54 )             37.5

## 2022-05-27 NOTE — PROGRESS NOTE ADULT - NS ATTEND AMEND GEN_ALL_CORE FT
Renal attd    -Pt to get labs with his PCP in one week(his daughter in law that is his PCP as well)  -No evidence of BERENICE at present     Sayed [facility]   [phone]

## 2022-05-27 NOTE — PROGRESS NOTE ADULT - PROBLEM SELECTOR PROBLEM 5
DM2 (diabetes mellitus, type 2)

## 2022-05-27 NOTE — PROGRESS NOTE ADULT - PROBLEM SELECTOR PROBLEM 7
PVD (peripheral vascular disease)

## 2022-05-27 NOTE — PROGRESS NOTE ADULT - PROBLEM SELECTOR PLAN 5
-Monitor FS.  -Check A1c.  -FS TIDACHS.  -Cath tomorrow, will be NPO.  -Start lantus 10 qHS. Can inc to therapeutic equivalent home dose once resuming PO tomorrow.  -Cont ISS

## 2022-05-27 NOTE — PROGRESS NOTE ADULT - PROBLEM SELECTOR PLAN 6
Stage 3b by eGFR. Cr is stable.  -Monitor labs. Avoid nephrotoxins.

## 2022-05-27 NOTE — PROGRESS NOTE ADULT - PROBLEM SELECTOR PLAN 3
Stable.  -Continue above cardiac regimen.

## 2022-05-27 NOTE — PROGRESS NOTE ADULT - PROBLEM SELECTOR PLAN 1
Volume status appears to have improve. SOB improved.  -Continue IV lasix 40 daily.  -Continue carvedilol, isosorbide, hydralazine.  -Reassess ARB pending decision re: need to cath considering CKD hx.  -Strict IO, daily wts.   - Echo noted
Volume status appears to have improve. SOB improved.  -Continue IV lasix 40 daily.  -Continue carvedilol, isosorbide, hydralazine.  -Reassess ARB pending decision re: need to cath considering CKD hx.  -Strict IO, daily wts.   -Echo
Volume status appears to have improve. SOB improved.  -Continue IV lasix 40 daily.  -Continue carvedilol, isosorbide, hydralazine.  -Reassess ARB pending decision re: need to cath considering CKD hx.  -Strict IO, daily wts.   -Echo
Volume status appears to have improve. SOB improved.  -Continue IV lasix 40 daily.  -Continue carvedilol, isosorbide, hydralazine.  -Reassess ARB pending decision re: need to cath considering CKD hx.  -Strict IO, daily wts.   - Echo noted
Volume status appears to have improve. SOB improved.  -Continue IV lasix 40 daily.  -Continue carvedilol, isosorbide, hydralazine.  -Reassess ARB pending decision re: need to cath considering CKD hx.  -Strict IO, daily wts.   - Echo noted

## 2022-05-31 ENCOUNTER — INPATIENT (INPATIENT)
Facility: HOSPITAL | Age: 87
LOS: 7 days | Discharge: HOME | End: 2022-06-08
Attending: INTERNAL MEDICINE | Admitting: INTERNAL MEDICINE
Payer: MEDICARE

## 2022-05-31 VITALS
OXYGEN SATURATION: 95 % | RESPIRATION RATE: 18 BRPM | HEIGHT: 68 IN | SYSTOLIC BLOOD PRESSURE: 108 MMHG | HEART RATE: 79 BPM | TEMPERATURE: 98 F | DIASTOLIC BLOOD PRESSURE: 43 MMHG

## 2022-05-31 DIAGNOSIS — R55 SYNCOPE AND COLLAPSE: ICD-10-CM

## 2022-05-31 DIAGNOSIS — Z95.5 PRESENCE OF CORONARY ANGIOPLASTY IMPLANT AND GRAFT: Chronic | ICD-10-CM

## 2022-05-31 DIAGNOSIS — Z98.89 OTHER SPECIFIED POSTPROCEDURAL STATES: Chronic | ICD-10-CM

## 2022-05-31 LAB
ALBUMIN SERPL ELPH-MCNC: 3.9 G/DL — SIGNIFICANT CHANGE UP (ref 3.3–5)
ALP SERPL-CCNC: 47 U/L — SIGNIFICANT CHANGE UP (ref 40–120)
ALT FLD-CCNC: 29 U/L — SIGNIFICANT CHANGE UP (ref 4–41)
ANION GAP SERPL CALC-SCNC: 11 MMOL/L — SIGNIFICANT CHANGE UP (ref 7–14)
APTT BLD: 25.8 SEC — LOW (ref 27–36.3)
AST SERPL-CCNC: 23 U/L — SIGNIFICANT CHANGE UP (ref 4–40)
BASOPHILS # BLD AUTO: 0.05 K/UL — SIGNIFICANT CHANGE UP (ref 0–0.2)
BASOPHILS NFR BLD AUTO: 0.7 % — SIGNIFICANT CHANGE UP (ref 0–2)
BILIRUB SERPL-MCNC: 0.5 MG/DL — SIGNIFICANT CHANGE UP (ref 0.2–1.2)
BUN SERPL-MCNC: 42 MG/DL — HIGH (ref 7–23)
CALCIUM SERPL-MCNC: 9.3 MG/DL — SIGNIFICANT CHANGE UP (ref 8.4–10.5)
CHLORIDE SERPL-SCNC: 103 MMOL/L — SIGNIFICANT CHANGE UP (ref 98–107)
CO2 SERPL-SCNC: 24 MMOL/L — SIGNIFICANT CHANGE UP (ref 22–31)
CREAT SERPL-MCNC: 2.11 MG/DL — HIGH (ref 0.5–1.3)
EGFR: 30 ML/MIN/1.73M2 — LOW
EOSINOPHIL # BLD AUTO: 0.32 K/UL — SIGNIFICANT CHANGE UP (ref 0–0.5)
EOSINOPHIL NFR BLD AUTO: 4.2 % — SIGNIFICANT CHANGE UP (ref 0–6)
GLUCOSE SERPL-MCNC: 220 MG/DL — HIGH (ref 70–99)
HCT VFR BLD CALC: 40.2 % — SIGNIFICANT CHANGE UP (ref 39–50)
HGB BLD-MCNC: 12.7 G/DL — LOW (ref 13–17)
IANC: 4.76 K/UL — SIGNIFICANT CHANGE UP (ref 1.8–7.4)
IMM GRANULOCYTES NFR BLD AUTO: 0.4 % — SIGNIFICANT CHANGE UP (ref 0–1.5)
INR BLD: 1.08 RATIO — SIGNIFICANT CHANGE UP (ref 0.88–1.16)
LYMPHOCYTES # BLD AUTO: 1.74 K/UL — SIGNIFICANT CHANGE UP (ref 1–3.3)
LYMPHOCYTES # BLD AUTO: 22.8 % — SIGNIFICANT CHANGE UP (ref 13–44)
MCHC RBC-ENTMCNC: 30 PG — SIGNIFICANT CHANGE UP (ref 27–34)
MCHC RBC-ENTMCNC: 31.6 GM/DL — LOW (ref 32–36)
MCV RBC AUTO: 94.8 FL — SIGNIFICANT CHANGE UP (ref 80–100)
MONOCYTES # BLD AUTO: 0.72 K/UL — SIGNIFICANT CHANGE UP (ref 0–0.9)
MONOCYTES NFR BLD AUTO: 9.4 % — SIGNIFICANT CHANGE UP (ref 2–14)
NEUTROPHILS # BLD AUTO: 4.76 K/UL — SIGNIFICANT CHANGE UP (ref 1.8–7.4)
NEUTROPHILS NFR BLD AUTO: 62.5 % — SIGNIFICANT CHANGE UP (ref 43–77)
NRBC # BLD: 0 /100 WBCS — SIGNIFICANT CHANGE UP
NRBC # FLD: 0 K/UL — SIGNIFICANT CHANGE UP
NT-PROBNP SERPL-SCNC: 858 PG/ML — HIGH
PA ADP PRP-ACNC: 5 PRU — LOW (ref 194–418)
PLATELET # BLD AUTO: 204 K/UL — SIGNIFICANT CHANGE UP (ref 150–400)
POTASSIUM SERPL-MCNC: 4.9 MMOL/L — SIGNIFICANT CHANGE UP (ref 3.5–5.3)
POTASSIUM SERPL-SCNC: 4.9 MMOL/L — SIGNIFICANT CHANGE UP (ref 3.5–5.3)
PROT SERPL-MCNC: 7.1 G/DL — SIGNIFICANT CHANGE UP (ref 6–8.3)
PROTHROM AB SERPL-ACNC: 12.5 SEC — SIGNIFICANT CHANGE UP (ref 10.5–13.4)
RBC # BLD: 4.24 M/UL — SIGNIFICANT CHANGE UP (ref 4.2–5.8)
RBC # FLD: 13.3 % — SIGNIFICANT CHANGE UP (ref 10.3–14.5)
SARS-COV-2 RNA SPEC QL NAA+PROBE: SIGNIFICANT CHANGE UP
SODIUM SERPL-SCNC: 138 MMOL/L — SIGNIFICANT CHANGE UP (ref 135–145)
TROPONIN T, HIGH SENSITIVITY RESULT: 47 NG/L — SIGNIFICANT CHANGE UP
WBC # BLD: 7.62 K/UL — SIGNIFICANT CHANGE UP (ref 3.8–10.5)
WBC # FLD AUTO: 7.62 K/UL — SIGNIFICANT CHANGE UP (ref 3.8–10.5)

## 2022-05-31 PROCEDURE — 99223 1ST HOSP IP/OBS HIGH 75: CPT

## 2022-05-31 PROCEDURE — 70450 CT HEAD/BRAIN W/O DYE: CPT | Mod: 26,MA

## 2022-05-31 PROCEDURE — G1004: CPT

## 2022-05-31 PROCEDURE — 72125 CT NECK SPINE W/O DYE: CPT | Mod: 26,MG

## 2022-05-31 PROCEDURE — 93010 ELECTROCARDIOGRAM REPORT: CPT

## 2022-05-31 PROCEDURE — 99285 EMERGENCY DEPT VISIT HI MDM: CPT | Mod: 25

## 2022-05-31 PROCEDURE — 71045 X-RAY EXAM CHEST 1 VIEW: CPT | Mod: 26

## 2022-05-31 RX ORDER — TETANUS TOXOID, REDUCED DIPHTHERIA TOXOID AND ACELLULAR PERTUSSIS VACCINE, ADSORBED 5; 2.5; 8; 8; 2.5 [IU]/.5ML; [IU]/.5ML; UG/.5ML; UG/.5ML; UG/.5ML
0.5 SUSPENSION INTRAMUSCULAR ONCE
Refills: 0 | Status: DISCONTINUED | OUTPATIENT
Start: 2022-05-31 | End: 2022-05-31

## 2022-05-31 NOTE — ED PROVIDER NOTE - CLINICAL SUMMARY MEDICAL DECISION MAKING FREE TEXT BOX
Pt with multiple cardiac rf and recent stent here with multiple syncopal episodes and scattered abrasions. Concerning for cardiac - ischemia/arrhythmia, electrolyte, possibly seizure, will need labs ct and admission.

## 2022-05-31 NOTE — ED PROVIDER NOTE - ATTENDING CONTRIBUTION TO CARE
Attending Statement: I have personally seen and examined this patient. I have fully participated in the care of this patient. I have reviewed all pertinent clinical information, including history physical exam, plan and the Resident's note and agree except as noted  88M with PMH of CAD s/p CABG, s/p stents, recent STEMI (3/2022), HTN, DM2, CKD, PVD s/p RLE stent from home w son at bedside co syncope over the last three days. Pt was recently dc home sp stent placement, son states he passed out several times at home witnessed by family. He has fallen, ?head trauma, LOC for 30 sec at a time. no cp/sob/palpitations. pt denies any complaints. Today again he "passed out" son drove him to ED. pt overall weak, no focal weakness/numbness/. no headache no neck pain no cp/sob no n/v/d no abdominal pain. +abrasion of feet, lower leg, tetanus ?utd. pt on plavix/aspirin  Vital signs noted no distress laying down, no facial or head trauma. EOMI. no facial asymmtery no midline tenderness of cervical/thoracic spine no chest wall tenderness. normal S1-S2 No resp distress. able to speak in full and clear sentences. no wheeze, rales or stridor.. soft nt abdomen slight ecchymosis of lower abdomen, stable pelvis, +guaze right groin, no swelling, nt examined w RN at bedside. superficial abrasion on the left elbow w nl ROM. nt to touch nl  bl hands. no focal deficits. superficial abrasion of the lower anterior right leg nl rom of bl hips/knees/ankle superficial abrasion of bl toes, no bleeding.   plan labs, ekg, ct head/ neck, cxr, tele monitor, tba

## 2022-05-31 NOTE — ED PROVIDER NOTE - OBJECTIVE STATEMENT
87 y/o M with a significant cardiac hx including CABG and multiple stents most recently placed earlier this week here with 3 days of recurrent syncopal episodes, worse when ambulating but also at rest. Unclear if head strike but does have neck pain. No cp prior to events, no tongue biting or incontinence. No fever cough, sob, abd pain, n/v/d. Refused to come all weekend was brought in by son today.

## 2022-05-31 NOTE — ED ADULT NURSE NOTE - NSFALLRSKHRMRISKTYPE_ED_ALL_ED
age(85 years old or older)/coagulation(Bleeding disorder R/T clinical cond/anti-coags)/surgery(72hr postop, recent leg amputee, sig. abd/thor surg)

## 2022-05-31 NOTE — ED ADULT TRIAGE NOTE - CHIEF COMPLAINT QUOTE
Pt recent cardiac stent placement c/o 7-8 syncopal episodes in the past 4 days and arrives with multiple abrasions and states episodes are preceded by confusion and dizziness. Pt denies hitting his head on ASA and Plavix.

## 2022-05-31 NOTE — ED PROVIDER NOTE - PHYSICAL EXAMINATION
Vitals: I have reviewed the patients vital signs  General: weak and chronically ill appearing  HEENT: no gross signs externally of trauma  Eyes: EOMI, tracking appropriately  Neck: no tracheal deviation, no JVD, able to range without difficulty  Chest/Lungs: no trauma, symmetric chest rise, speaking in complete sentences, no WOB, ctab  Heart: skin and extremities well perfused, regular rate and rhythm  Neuro: A+Ox3, ambulation deferred, CN grossly intact  MSK: globally weak  Skin: trace abrasions most notably anterior shin on R oozing no active bleeding.

## 2022-05-31 NOTE — ED PROVIDER NOTE - PROGRESS NOTE DETAILS
pt stable, pending ct and tele admit.   trop 47 last trop 41 pt denies cp or sob. CT WNL will admit to tele.

## 2022-05-31 NOTE — ED ADULT NURSE NOTE - OBJECTIVE STATEMENT
pt arrives via wheelchair from triage, accompanied by son for multipl syncope episodes since his d/c from [facility] Friday. pt recalls the events leading up to the episode. pt denies hitting head. per son, each episode lasted approx 30 seconds. pt aox4. linear skin tear to right forearm;  Multiple abrasions noted to left forearm; bilateral knees and toes on both feet. PT able to stand and transfer to stretcher with assistance. pt g+ generalized weakness. pt recently had cardiac cath last week, accessed right groin, incision healing well, dressing clean. no signs of infection. pt placed on telemetry, EKG completed. pending labs. right AC 18g inserted. will continue to monitor.

## 2022-05-31 NOTE — ED ADULT NURSE NOTE - NSIMPLEMENTINTERV_GEN_ALL_ED
Implemented All Fall with Harm Risk Interventions:  Ethel to call system. Call bell, personal items and telephone within reach. Instruct patient to call for assistance. Room bathroom lighting operational. Non-slip footwear when patient is off stretcher. Physically safe environment: no spills, clutter or unnecessary equipment. Stretcher in lowest position, wheels locked, appropriate side rails in place. Provide visual cue, wrist band, yellow gown, etc. Monitor gait and stability. Monitor for mental status changes and reorient to person, place, and time. Review medications for side effects contributing to fall risk. Reinforce activity limits and safety measures with patient and family. Provide visual clues: red socks.

## 2022-06-01 DIAGNOSIS — Z29.9 ENCOUNTER FOR PROPHYLACTIC MEASURES, UNSPECIFIED: ICD-10-CM

## 2022-06-01 DIAGNOSIS — R55 SYNCOPE AND COLLAPSE: ICD-10-CM

## 2022-06-01 DIAGNOSIS — N18.2 CHRONIC KIDNEY DISEASE, STAGE 2 (MILD): ICD-10-CM

## 2022-06-01 DIAGNOSIS — E11.9 TYPE 2 DIABETES MELLITUS WITHOUT COMPLICATIONS: ICD-10-CM

## 2022-06-01 DIAGNOSIS — I25.10 ATHEROSCLEROTIC HEART DISEASE OF NATIVE CORONARY ARTERY WITHOUT ANGINA PECTORIS: ICD-10-CM

## 2022-06-01 DIAGNOSIS — I10 ESSENTIAL (PRIMARY) HYPERTENSION: ICD-10-CM
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
## 2022-06-01 LAB
ALBUMIN SERPL ELPH-MCNC: 4.1 G/DL — SIGNIFICANT CHANGE UP (ref 3.3–5)
ALP SERPL-CCNC: 50 U/L — SIGNIFICANT CHANGE UP (ref 40–120)
ALT FLD-CCNC: 26 U/L — SIGNIFICANT CHANGE UP (ref 4–41)
ANION GAP SERPL CALC-SCNC: 12 MMOL/L — SIGNIFICANT CHANGE UP (ref 7–14)
AST SERPL-CCNC: 22 U/L — SIGNIFICANT CHANGE UP (ref 4–40)
BASOPHILS # BLD AUTO: 0.06 K/UL — SIGNIFICANT CHANGE UP (ref 0–0.2)
BASOPHILS NFR BLD AUTO: 0.8 % — SIGNIFICANT CHANGE UP (ref 0–2)
BILIRUB SERPL-MCNC: 0.5 MG/DL — SIGNIFICANT CHANGE UP (ref 0.2–1.2)
BUN SERPL-MCNC: 38 MG/DL — HIGH (ref 7–23)
CALCIUM SERPL-MCNC: 9.4 MG/DL — SIGNIFICANT CHANGE UP (ref 8.4–10.5)
CHLORIDE SERPL-SCNC: 103 MMOL/L — SIGNIFICANT CHANGE UP (ref 98–107)
CO2 SERPL-SCNC: 24 MMOL/L — SIGNIFICANT CHANGE UP (ref 22–31)
CREAT SERPL-MCNC: 1.82 MG/DL — HIGH (ref 0.5–1.3)
EGFR: 35 ML/MIN/1.73M2 — LOW
EOSINOPHIL # BLD AUTO: 0.32 K/UL — SIGNIFICANT CHANGE UP (ref 0–0.5)
EOSINOPHIL NFR BLD AUTO: 4.2 % — SIGNIFICANT CHANGE UP (ref 0–6)
GLUCOSE BLDC GLUCOMTR-MCNC: 111 MG/DL — HIGH (ref 70–99)
GLUCOSE BLDC GLUCOMTR-MCNC: 194 MG/DL — HIGH (ref 70–99)
GLUCOSE BLDC GLUCOMTR-MCNC: 220 MG/DL — HIGH (ref 70–99)
GLUCOSE BLDC GLUCOMTR-MCNC: 259 MG/DL — HIGH (ref 70–99)
GLUCOSE BLDC GLUCOMTR-MCNC: 267 MG/DL — HIGH (ref 70–99)
GLUCOSE BLDC GLUCOMTR-MCNC: 270 MG/DL — HIGH (ref 70–99)
GLUCOSE BLDC GLUCOMTR-MCNC: 346 MG/DL — HIGH (ref 70–99)
GLUCOSE BLDC GLUCOMTR-MCNC: 430 MG/DL — HIGH (ref 70–99)
GLUCOSE SERPL-MCNC: 196 MG/DL — HIGH (ref 70–99)
HCT VFR BLD CALC: 40.2 % — SIGNIFICANT CHANGE UP (ref 39–50)
HGB BLD-MCNC: 12.9 G/DL — LOW (ref 13–17)
IANC: 5.13 K/UL — SIGNIFICANT CHANGE UP (ref 1.8–7.4)
IMM GRANULOCYTES NFR BLD AUTO: 0.3 % — SIGNIFICANT CHANGE UP (ref 0–1.5)
LYMPHOCYTES # BLD AUTO: 1.58 K/UL — SIGNIFICANT CHANGE UP (ref 1–3.3)
LYMPHOCYTES # BLD AUTO: 20.5 % — SIGNIFICANT CHANGE UP (ref 13–44)
MAGNESIUM SERPL-MCNC: 2.4 MG/DL — SIGNIFICANT CHANGE UP (ref 1.6–2.6)
MCHC RBC-ENTMCNC: 29.7 PG — SIGNIFICANT CHANGE UP (ref 27–34)
MCHC RBC-ENTMCNC: 32.1 GM/DL — SIGNIFICANT CHANGE UP (ref 32–36)
MCV RBC AUTO: 92.6 FL — SIGNIFICANT CHANGE UP (ref 80–100)
MONOCYTES # BLD AUTO: 0.58 K/UL — SIGNIFICANT CHANGE UP (ref 0–0.9)
MONOCYTES NFR BLD AUTO: 7.5 % — SIGNIFICANT CHANGE UP (ref 2–14)
NEUTROPHILS # BLD AUTO: 5.13 K/UL — SIGNIFICANT CHANGE UP (ref 1.8–7.4)
NEUTROPHILS NFR BLD AUTO: 66.7 % — SIGNIFICANT CHANGE UP (ref 43–77)
NRBC # BLD: 0 /100 WBCS — SIGNIFICANT CHANGE UP
NRBC # FLD: 0 K/UL — SIGNIFICANT CHANGE UP
PHOSPHATE SERPL-MCNC: 4 MG/DL — SIGNIFICANT CHANGE UP (ref 2.5–4.5)
PLATELET # BLD AUTO: 198 K/UL — SIGNIFICANT CHANGE UP (ref 150–400)
POTASSIUM SERPL-MCNC: 4.2 MMOL/L — SIGNIFICANT CHANGE UP (ref 3.5–5.3)
POTASSIUM SERPL-SCNC: 4.2 MMOL/L — SIGNIFICANT CHANGE UP (ref 3.5–5.3)
PROT SERPL-MCNC: 7.2 G/DL — SIGNIFICANT CHANGE UP (ref 6–8.3)
RBC # BLD: 4.34 M/UL — SIGNIFICANT CHANGE UP (ref 4.2–5.8)
RBC # FLD: 13.2 % — SIGNIFICANT CHANGE UP (ref 10.3–14.5)
SODIUM SERPL-SCNC: 139 MMOL/L — SIGNIFICANT CHANGE UP (ref 135–145)
TROPONIN T, HIGH SENSITIVITY RESULT: 47 NG/L — SIGNIFICANT CHANGE UP
WBC # BLD: 7.69 K/UL — SIGNIFICANT CHANGE UP (ref 3.8–10.5)
WBC # FLD AUTO: 7.69 K/UL — SIGNIFICANT CHANGE UP (ref 3.8–10.5)

## 2022-06-01 RX ORDER — GLUCAGON INJECTION, SOLUTION 0.5 MG/.1ML
1 INJECTION, SOLUTION SUBCUTANEOUS ONCE
Refills: 0 | Status: DISCONTINUED | OUTPATIENT
Start: 2022-06-01 | End: 2022-06-08

## 2022-06-01 RX ORDER — DEXTROSE 50 % IN WATER 50 %
25 SYRINGE (ML) INTRAVENOUS ONCE
Refills: 0 | Status: DISCONTINUED | OUTPATIENT
Start: 2022-06-01 | End: 2022-06-08

## 2022-06-01 RX ORDER — INSULIN LISPRO 100/ML
VIAL (ML) SUBCUTANEOUS AT BEDTIME
Refills: 0 | Status: DISCONTINUED | OUTPATIENT
Start: 2022-06-01 | End: 2022-06-08

## 2022-06-01 RX ORDER — TICAGRELOR 90 MG/1
90 TABLET ORAL EVERY 12 HOURS
Refills: 0 | Status: DISCONTINUED | OUTPATIENT
Start: 2022-06-01 | End: 2022-06-08

## 2022-06-01 RX ORDER — ATORVASTATIN CALCIUM 80 MG/1
80 TABLET, FILM COATED ORAL AT BEDTIME
Refills: 0 | Status: DISCONTINUED | OUTPATIENT
Start: 2022-06-01 | End: 2022-06-08

## 2022-06-01 RX ORDER — AMLODIPINE BESYLATE 2.5 MG/1
2.5 TABLET ORAL ONCE
Refills: 0 | Status: COMPLETED | OUTPATIENT
Start: 2022-06-01 | End: 2022-06-01

## 2022-06-01 RX ORDER — FUROSEMIDE 40 MG
20 TABLET ORAL DAILY
Refills: 0 | Status: DISCONTINUED | OUTPATIENT
Start: 2022-06-01 | End: 2022-06-01

## 2022-06-01 RX ORDER — SODIUM CHLORIDE 9 MG/ML
1000 INJECTION INTRAMUSCULAR; INTRAVENOUS; SUBCUTANEOUS
Refills: 0 | Status: DISCONTINUED | OUTPATIENT
Start: 2022-06-01 | End: 2022-06-08

## 2022-06-01 RX ORDER — CHOLECALCIFEROL (VITAMIN D3) 125 MCG
1000 CAPSULE ORAL DAILY
Refills: 0 | Status: DISCONTINUED | OUTPATIENT
Start: 2022-06-01 | End: 2022-06-08

## 2022-06-01 RX ORDER — DEXTROSE 50 % IN WATER 50 %
15 SYRINGE (ML) INTRAVENOUS ONCE
Refills: 0 | Status: DISCONTINUED | OUTPATIENT
Start: 2022-06-01 | End: 2022-06-08

## 2022-06-01 RX ORDER — SODIUM CHLORIDE 9 MG/ML
500 INJECTION INTRAMUSCULAR; INTRAVENOUS; SUBCUTANEOUS ONCE
Refills: 0 | Status: COMPLETED | OUTPATIENT
Start: 2022-06-01 | End: 2022-06-01

## 2022-06-01 RX ORDER — ASPIRIN/CALCIUM CARB/MAGNESIUM 324 MG
81 TABLET ORAL DAILY
Refills: 0 | Status: DISCONTINUED | OUTPATIENT
Start: 2022-06-01 | End: 2022-06-08

## 2022-06-01 RX ORDER — ACETAMINOPHEN 500 MG
650 TABLET ORAL EVERY 6 HOURS
Refills: 0 | Status: DISCONTINUED | OUTPATIENT
Start: 2022-06-01 | End: 2022-06-08

## 2022-06-01 RX ORDER — INSULIN GLARGINE 100 [IU]/ML
10 INJECTION, SOLUTION SUBCUTANEOUS AT BEDTIME
Refills: 0 | Status: DISCONTINUED | OUTPATIENT
Start: 2022-06-01 | End: 2022-06-01

## 2022-06-01 RX ORDER — HEPARIN SODIUM 5000 [USP'U]/ML
5000 INJECTION INTRAVENOUS; SUBCUTANEOUS EVERY 12 HOURS
Refills: 0 | Status: DISCONTINUED | OUTPATIENT
Start: 2022-06-01 | End: 2022-06-08

## 2022-06-01 RX ORDER — SODIUM CHLORIDE 9 MG/ML
1000 INJECTION, SOLUTION INTRAVENOUS
Refills: 0 | Status: DISCONTINUED | OUTPATIENT
Start: 2022-06-01 | End: 2022-06-08

## 2022-06-01 RX ORDER — INSULIN LISPRO 100/ML
VIAL (ML) SUBCUTANEOUS
Refills: 0 | Status: DISCONTINUED | OUTPATIENT
Start: 2022-06-01 | End: 2022-06-08

## 2022-06-01 RX ORDER — GABAPENTIN 400 MG/1
400 CAPSULE ORAL
Refills: 0 | Status: DISCONTINUED | OUTPATIENT
Start: 2022-06-01 | End: 2022-06-01

## 2022-06-01 RX ORDER — GABAPENTIN 400 MG/1
300 CAPSULE ORAL
Refills: 0 | Status: DISCONTINUED | OUTPATIENT
Start: 2022-06-01 | End: 2022-06-08

## 2022-06-01 RX ORDER — PANTOPRAZOLE SODIUM 20 MG/1
40 TABLET, DELAYED RELEASE ORAL
Refills: 0 | Status: DISCONTINUED | OUTPATIENT
Start: 2022-06-01 | End: 2022-06-08

## 2022-06-01 RX ORDER — BACITRACIN ZINC 500 UNIT/G
1 OINTMENT IN PACKET (EA) TOPICAL
Refills: 0 | Status: DISCONTINUED | OUTPATIENT
Start: 2022-06-01 | End: 2022-06-08

## 2022-06-01 RX ORDER — INSULIN GLARGINE 100 [IU]/ML
20 INJECTION, SOLUTION SUBCUTANEOUS
Refills: 0 | Status: DISCONTINUED | OUTPATIENT
Start: 2022-06-02 | End: 2022-06-08

## 2022-06-01 RX ORDER — ESCITALOPRAM OXALATE 10 MG/1
10 TABLET, FILM COATED ORAL DAILY
Refills: 0 | Status: DISCONTINUED | OUTPATIENT
Start: 2022-06-01 | End: 2022-06-08

## 2022-06-01 RX ORDER — LANOLIN ALCOHOL/MO/W.PET/CERES
3 CREAM (GRAM) TOPICAL AT BEDTIME
Refills: 0 | Status: DISCONTINUED | OUTPATIENT
Start: 2022-06-01 | End: 2022-06-08

## 2022-06-01 RX ORDER — INSULIN GLARGINE 100 [IU]/ML
20 INJECTION, SOLUTION SUBCUTANEOUS ONCE
Refills: 0 | Status: COMPLETED | OUTPATIENT
Start: 2022-06-01 | End: 2022-06-01

## 2022-06-01 RX ORDER — DEXTROSE 50 % IN WATER 50 %
12.5 SYRINGE (ML) INTRAVENOUS ONCE
Refills: 0 | Status: DISCONTINUED | OUTPATIENT
Start: 2022-06-01 | End: 2022-06-08

## 2022-06-01 RX ADMIN — GABAPENTIN 300 MILLIGRAM(S): 400 CAPSULE ORAL at 17:52

## 2022-06-01 RX ADMIN — Medication 650 MILLIGRAM(S): at 14:30

## 2022-06-01 RX ADMIN — Medication 1: at 12:35

## 2022-06-01 RX ADMIN — HEPARIN SODIUM 5000 UNIT(S): 5000 INJECTION INTRAVENOUS; SUBCUTANEOUS at 07:51

## 2022-06-01 RX ADMIN — INSULIN GLARGINE 20 UNIT(S): 100 INJECTION, SOLUTION SUBCUTANEOUS at 11:04

## 2022-06-01 RX ADMIN — TICAGRELOR 90 MILLIGRAM(S): 90 TABLET ORAL at 07:51

## 2022-06-01 RX ADMIN — AMLODIPINE BESYLATE 2.5 MILLIGRAM(S): 2.5 TABLET ORAL at 03:35

## 2022-06-01 RX ADMIN — Medication 650 MILLIGRAM(S): at 21:33

## 2022-06-01 RX ADMIN — PANTOPRAZOLE SODIUM 40 MILLIGRAM(S): 20 TABLET, DELAYED RELEASE ORAL at 08:52

## 2022-06-01 RX ADMIN — GABAPENTIN 300 MILLIGRAM(S): 400 CAPSULE ORAL at 07:50

## 2022-06-01 RX ADMIN — Medication 650 MILLIGRAM(S): at 13:30

## 2022-06-01 RX ADMIN — ATORVASTATIN CALCIUM 80 MILLIGRAM(S): 80 TABLET, FILM COATED ORAL at 21:31

## 2022-06-01 RX ADMIN — Medication 20 MILLIGRAM(S): at 07:51

## 2022-06-01 RX ADMIN — HEPARIN SODIUM 5000 UNIT(S): 5000 INJECTION INTRAVENOUS; SUBCUTANEOUS at 17:52

## 2022-06-01 RX ADMIN — SODIUM CHLORIDE 250 MILLILITER(S): 9 INJECTION INTRAMUSCULAR; INTRAVENOUS; SUBCUTANEOUS at 16:22

## 2022-06-01 RX ADMIN — Medication 3: at 08:53

## 2022-06-01 RX ADMIN — ESCITALOPRAM OXALATE 10 MILLIGRAM(S): 10 TABLET, FILM COATED ORAL at 12:34

## 2022-06-01 RX ADMIN — Medication 1000 UNIT(S): at 12:35

## 2022-06-01 RX ADMIN — TICAGRELOR 90 MILLIGRAM(S): 90 TABLET ORAL at 17:52

## 2022-06-01 RX ADMIN — Medication 650 MILLIGRAM(S): at 22:33

## 2022-06-01 RX ADMIN — SODIUM CHLORIDE 75 MILLILITER(S): 9 INJECTION INTRAMUSCULAR; INTRAVENOUS; SUBCUTANEOUS at 18:35

## 2022-06-01 RX ADMIN — Medication 81 MILLIGRAM(S): at 12:34

## 2022-06-01 NOTE — CONSULT NOTE ADULT - SUBJECTIVE AND OBJECTIVE BOX
[facility]        Patient is a 88y old  Male who presents with a chief complaint of syncope (2022 01:29)    Excerpt from H&P,"     This is a 89 y/o M with pmhx of CAD s/p CABG, stent recently in 2022, HTN, DM2, CKD, PVD with stent presented to the ED for new onset syncopal episodes. The patient was recently diagnosed with CHF exacerbation on the previous admission and was given a stent because of an abnormal stress test. The patient since then had been discharged. 2 days ago, the patient had been having syncopal episodes that last for a few seconds. The patient would be sitting on the chair and would suddenly pass out for a few seconds. He does not remember what occurred before the event. This happened 3 times on . Yesterday the patient was sitting on the stairs outside. Then the son found him on the ground when he stepped away to get a pillow for the patient. No head trauma.    I spoke with the son and his wife is the PCP for the patient. Initially the wife thought it could be orthostatic hypotension but she said the patient denies lightheadedness before the event. She is concerned that due to the recent stent placement, was concerning for arrythmia. The patient denies chest pain or new onset shortness of breath. She also told me that the event usually occurs after he exerts himself and sits down. She also stopped his hydralazine yesterday because he was mildly hypotensive. They endorsed that they checked his BP at home and it was 92 systolic but when he was in the ED it was elevated. Denies hx of seizures. (2022 01:29)           *****PAST MEDICAL / Surgical  HISTORY:  PAST MEDICAL & SURGICAL HISTORY:  Hyperlipemia      Hypertension      Coronary Artery Disease      Diabetes Mellitus Type II      Stented Coronary Artery  total 5 stents, last stent 2019      Neuropathy      Myocardial infarction      Stroke  mild left facial numbness   no other residuals verbalized      History of Cataract Extraction      Hx of CABG      H/O coronary angiogram      S/P coronary artery stent placement  09               *****FAMILY HISTORY:  FAMILY HISTORY:  No pertinent family history in first degree relatives             *****SOCIAL HISTORY:  Alcohol: None  Smoking: None         *****ALLERGIES:   Allergies    carbamazepine (Other)  Cipro (Unknown)  fluoroquinolone antibiotics (Other)  Tegretol (Unknown)    Intolerances             *****MEDICATIONS: current medication reviewed and documented.   MEDICATIONS  (STANDING):  aspirin enteric coated 81 milliGRAM(s) Oral daily  atorvastatin 80 milliGRAM(s) Oral at bedtime  BACItracin   Ointment 1 Application(s) Topical two times a day  cholecalciferol 1000 Unit(s) Oral daily  dextrose 5%. 1000 milliLiter(s) (50 mL/Hr) IV Continuous <Continuous>  dextrose 5%. 1000 milliLiter(s) (100 mL/Hr) IV Continuous <Continuous>  dextrose 50% Injectable 25 Gram(s) IV Push once  dextrose 50% Injectable 12.5 Gram(s) IV Push once  dextrose 50% Injectable 25 Gram(s) IV Push once  escitalopram 10 milliGRAM(s) Oral daily  furosemide    Tablet 20 milliGRAM(s) Oral daily  gabapentin 300 milliGRAM(s) Oral two times a day  glucagon  Injectable 1 milliGRAM(s) IntraMuscular once  heparin   Injectable 5000 Unit(s) SubCutaneous every 12 hours  insulin lispro (ADMELOG) corrective regimen sliding scale   SubCutaneous three times a day before meals  insulin lispro (ADMELOG) corrective regimen sliding scale   SubCutaneous at bedtime  pantoprazole    Tablet 40 milliGRAM(s) Oral before breakfast  sodium chloride 0.9%. 1000 milliLiter(s) (75 mL/Hr) IV Continuous <Continuous>  ticagrelor 90 milliGRAM(s) Oral every 12 hours    MEDICATIONS  (PRN):  acetaminophen     Tablet .. 650 milliGRAM(s) Oral every 6 hours PRN Temp greater or equal to 38C (100.4F), Mild Pain (1 - 3)  dextrose Oral Gel 15 Gram(s) Oral once PRN Blood Glucose LESS THAN 70 milliGRAM(s)/deciliter  melatonin 3 milliGRAM(s) Oral at bedtime PRN Insomnia           *****REVIEW OF SYSTEM:  GEN: no fever, no chills, no pain  RESP: no SOB, no cough, no sputum  CVS: no chest pain, no palpitations, no edema  GI: no abdominal pain, no nausea, no vomiting, no constipation, no diarrhea  : no dysurea, no frequency, no hematurea  Neuro: no headache, no dizziness  PSYCH: no anxiety, no depression  Derm : no itching, no rash         *****VITAL SIGNS:  T(C): 36.6 (06-01-22 @ 11:23), Max: 36.7 (22 @ 22:21)  HR: 70 (22 @ 15:51) (66 - 73)  BP: 134/60 (22 @ 15:51) (122/65 - 174/62)  RR: 17 (22 @ 11:23) (15 - 17)  SpO2: 100% (22 @ 11:23) (98% - 100%)  Wt(kg): --           *****PHYSICAL EXAM:   Alert oriented x 3   Attention comprehension are fair. Able to name, repeat, read without any difficulty.   Able to follow 3 step commands.     EOMI fundi not visualized,  VFF to confrontration  No facial asymmetry   Tongue is midline   Palate elevates symmetrically   Moving all 4 ext symmetrically no pronator drift   Reflexes are symmetric throughout   sensation is grossly symmetric  Gait : not assessed.  B/L down going toes               *****LAB AND IMAGIN.9   7.69  )-----------( 198      ( 2022 06:12 )             40.2               06-    139  |  103  |  38<H>  ----------------------------<  196<H>  4.2   |  24  |  1.82<H>    Ca    9.4      2022 06:12  Phos  4.0     06-  Mg     2.40     06-    TPro  7.2  /  Alb  4.1  /  TBili  0.5  /  DBili  x   /  AST  22  /  ALT  26  /  AlkPhos  50  06-01    PT/INR - ( 31 May 2022 17:50 )   PT: 12.5 sec;   INR: 1.08 ratio         PTT - ( 31 May 2022 17:50 )  PTT:25.8 sec                            [All pertinent recent Imaging reports reviewed]         *****A S S E S S M E N T   A N D   P L A N :      Excerpt from H&P,"     This is a 89 y/o M with pmhx of CAD s/p CABG, stent recently in 2022, HTN, DM2, CKD, PVD with stent presented to the ED for new onset syncopal episodes. The patient was recently diagnosed with CHF exacerbation on the previous admission and was given a stent because of an abnormal stress test. The patient since then had been discharged. 2 days ago, the patient had been having syncopal episodes that last for a few seconds. The patient would be sitting on the chair and would suddenly pass out for a few seconds. He does not remember what occurred before the event. This happened 3 times on . Yesterday the patient was sitting on the stairs outside. Then the son found him on the ground when he stepped away to get a pillow for the patient. No head trauma.    I spoke with the son and his wife is the PCP for the patient. Initially the wife thought it could be orthostatic hypotension but she said the patient denies lightheadedness before the event. She is concerned that due to the recent stent placement, was concerning for arrythmia. The patient denies chest pain or new onset shortness of breath. She also told me that the event usually occurs after he exerts himself and sits down. She also stopped his hydralazine yesterday because he was mildly hypotensive. They endorsed that they checked his BP at home and it was 92 systolic but when he was in the ED it was elevated. Denies hx of seizures     Problem/Recommendations 1:syncope   without any prolonged post ictal period    hypotension vs arrythmia vs valvular pathology   check orthostatics   b12  hx of cervical spine stenosis related clauducation symptoms   cta vs. mra of post circulation r/o vertebobasilar insuff      Problem/Recommendations 2:dizziness unsteady gait   check b12          pt at risk for developing woresening delirium, therefore please institute the following preventative measures if possible          - initiating early mobilization          -minimizing "tethers" - IV, oxygen, catheters, etc          -avoiding   sedatives          -maintaining hydration/nutrition          -avoid anticholinergics - diphenhydramine, etc          -pain control          -sleep wake cycle regulation; avoid day time somnolence           -supportive environment    ___________________________  Will follow with you.  Thank you,  Wanda Moss MD  Diplomate of the American Board of Neurology and Psychiatry.  Diplomate of the American Board of Vascular Neurology.   [facility]   Ph: [phone]    Differential diagnosis and plan of care discussed with patient after the evaluation.   Advanced care planning options discussed.   Pain assessed and judicious use of narcotics when appropriate was discussed.  Importance of Fall prevention discussed.  Counseling on Smoking and Alcohol cessation was offered when appropriate.  Counseling on Diet, exercise, and medication compliance was done.   83 minutes spent on the total encounter;  more than 50 % of the visit was spent on counseling  and or coordinating care by the attending physician.    Thank you for allowing me to participate in the care of this humaira patient. Please do not hesitate to call me if you have any questions.     This and subsequent notes  will  inherently be subject to errors including those of syntax and substitutions which may escape proofreading. In such instances original meaning may be extrapolated by contextual derivation.

## 2022-06-01 NOTE — H&P ADULT - NSHPPHYSICALEXAM_GEN_ALL_CORE
PHYSICAL EXAM:  VITALS: Vital Signs Last 24 Hrs  T(C): 36.7 (31 May 2022 22:21), Max: 36.8 (31 May 2022 17:18)  T(F): 98 (31 May 2022 22:21), Max: 98.2 (31 May 2022 17:18)  HR: 69 (31 May 2022 22:21) (69 - 79)  BP: 174/62 (31 May 2022 22:21) (108/43 - 174/62)  BP(mean): --  RR: 15 (31 May 2022 22:21) (15 - 18)  SpO2: 98% (31 May 2022 22:21) (95% - 100%)  GENERAL: NAD, well-developed, well-nourished  HEAD:  Atraumatic, Normocephalic  EYES: EOMI, PERRL, conjunctiva and sclera clear  ENT: Moist Mucus Membranes present, no ulcers appreciated  NECK: Supple, No JVD  CHEST/LUNG: Clear to auscultation bilaterally; No wheezes, rales or rhonchi, no accessory muscle use  HEART: Regular rate and rhythm; + systolic murmur in mitral area, rubs, or gallops, (+)S1, S2  ABDOMEN: Soft, Nontender, Nondistended; Normal Bowel sounds   EXTREMITIES:  2+ Peripheral Pulses, No clubbing, cyanosis, or edema, skin scrapes on the R foot and R elbow  PSYCH: normal mood and affect  NEUROLOGY: AAOx3, non-focal  SKIN: No rashes or lesions

## 2022-06-01 NOTE — PHYSICAL THERAPY INITIAL EVALUATION ADULT - ADDITIONAL COMMENTS
Patient reports to live with his son who assists with ADLs. Patient reports to have occasionally used a cane and/or walker at baseline.

## 2022-06-01 NOTE — PROGRESS NOTE ADULT - ASSESSMENT
89 y/o M with pmhx of CAD s/p CABG, stent recently in 5/2022, HTN, DM2, CKD, PVD with stent presented to the ED for new onset syncopal episodes. Admit for syncope work up needing telemetry.     Problem/Plan - 1:  ·  Problem: Syncope.   ·  Plan: Assessment:  - patient has multiple episodes of syncope at home  - unclear etiology: no signs of lightheadedness before the episodes which could suspect orthostatic hypotension. However the patient is on multiple BP medications  - no hx of seizures  - no chest pain, Troponins neg x1, EKG shows RBBB and 1st degree AV block which is similar to previous EKG  - Echo from 5/2022 - normal EF, mild aortic stenosis  - orthostatic positive.   - hold BP meds  - does not need a repeat echo as it was done 2 weeks ago  - cardiology consulted,      Problem/Plan - 2:  ·  Problem: Fall.   ·  Plan: Assessment:  - patient fell on concrete while sitting on the stairs  - No head trauma, but has skin scrapes on foot and elbow    Plan:  - PT eval  - evaluate for etiology for fall as above.     Problem/Plan - 3:  ·  Problem: Benign essential HTN.   ·  Plan: - hold BP meds for now for syncope work up, will give one dose of norvasc for now for HTN.     Problem/Plan - 4:  ·  Problem: DM2 (diabetes mellitus, type 2).   ·  Plan: - 10U lantus QHs.     Problem/Plan - 5:  ·  Problem: CAD (coronary artery disease).   ·  Plan: - c/w DAPT.     Problem/Plan - 6:  ·  Problem: Stage 3 chronic kidney disease.   ·  Plan: - monitor for now.     Problem/Plan - 7:  ·  Problem: Prophylactic measure.   ·  Plan: - heparin subq for dvt ppx.    Dispo : DC planning pending above.

## 2022-06-01 NOTE — PATIENT PROFILE ADULT - FALL HARM RISK - HARM RISK INTERVENTIONS

## 2022-06-01 NOTE — PHYSICAL THERAPY INITIAL EVALUATION ADULT - ACTIVE RANGE OF MOTION EXAMINATION, REHAB EVAL
yovani. upper extremity Active ROM was WNL (within normal limits)/bilateral lower extremity Active ROM was WNL (within normal limits)

## 2022-06-01 NOTE — H&P ADULT - ASSESSMENT
87 y/o M with pmhx of CAD s/p CABG, stent recently in 5/2022, HTN, DM2, CKD, PVD with stent presented to the ED for new onset syncopal episodes. Admit for syncope work up needing telemetry.

## 2022-06-01 NOTE — H&P ADULT - PROBLEM SELECTOR PLAN 2
Assessment:  - patient fell on concrete while sitting on the stairs  - No head trauma, but has skin scrapes on foot and elbow    Plan:  - PT eval  - evaluate for etiology for fall as above

## 2022-06-01 NOTE — H&P ADULT - NSHPREVIEWOFSYSTEMS_GEN_ALL_CORE
REVIEW OF SYSTEMS:    CONSTITUTIONAL: No weakness, fevers or chills, + Syncope  EYES/ENT: No visual changes;  No dysphagia; No sore throat; No rhinorrhea; No sinus pain/pressure  NECK: No pain or stiffness  RESPIRATORY: No cough, wheezing, hemoptysis; No shortness of breath  CARDIOVASCULAR: No chest pain or palpitations; No lower extremity edema  GASTROINTESTINAL: No abdominal or epigastric pain. No nausea, vomiting, or hematemesis; No diarrhea or constipation. No melena or hematochezia.  GENITOURINARY: No dysuria, frequency or hematuria  NEUROLOGICAL: No numbness or weakness  MSK: ambulates with walker  SKIN: No itching, burning, rashes, or lesions   All other review of systems is negative unless indicated above.

## 2022-06-01 NOTE — H&P ADULT - HISTORY OF PRESENT ILLNESS
This is a 89 y/o M with pmhx of CAD s/p CABG, stent recently in 5/2022, HTN, DM2, CKD, PVD with stent presented to the ED for new onset syncopal episodes. The patient was recently diagnosed with CHF exacerbation on the previous admission and was given a stent because of an abnormal stress test. The patient since then had been discharged. 2 days ago, the patient had been having syncopal episodes that last for a few seconds. The patient would be sitting on the chair and would suddenly pass out for a few seconds. He does not remember what occurred before the event. This happened 3 times on sunday. Yesterday the patient was sitting on the stairs outside. Then the son found him on the ground when he stepped away to get a pillow for the patient. No head trauma.    I spoke with the son and his wife is the PCP for the patient. Initially the wife thought it could be orthostatic hypotension but she said the patient denies lightheadedness before the event. She is concerned that due to the recent stent placement, was concerning for arrythmia. The patient denies chest pain or new onset shortness of breath. She also told me that the event usually occurs after he exerts himself and sits down. She also stopped his hydralazine yesterday because he was mildly hypotensive. They endorsed that they checked his BP at home and it was 92 systolic but when he was in the ED it was elevated. Denies hx of seizures.

## 2022-06-01 NOTE — H&P ADULT - NSHPLABSRESULTS_GEN_ALL_CORE
12.7   7.62  )-----------( 204      ( 31 May 2022 17:50 )             40.2       05-31    138  |  103  |  42<H>  ----------------------------<  220<H>  4.9   |  24  |  2.11<H>    Ca    9.3      31 May 2022 17:50    TPro  7.1  /  Alb  3.9  /  TBili  0.5  /  DBili  x   /  AST  23  /  ALT  29  /  AlkPhos  47  05-31            PT/INR - ( 31 May 2022 17:50 )   PT: 12.5 sec;   INR: 1.08 ratio         PTT - ( 31 May 2022 17:50 )  PTT:25.8 sec

## 2022-06-01 NOTE — PROGRESS NOTE ADULT - SUBJECTIVE AND OBJECTIVE BOX
Date of Service  : 06-01-22     INTERVAL HPI/OVERNIGHT EVENTS: Seen and examined earlier today . Son in room.   Vital Signs Last 24 Hrs  T(C): 36.6 (01 Jun 2022 11:23), Max: 36.7 (31 May 2022 22:21)  T(F): 97.8 (01 Jun 2022 11:23), Max: 98 (31 May 2022 22:21)  HR: 70 (01 Jun 2022 15:51) (66 - 73)  BP: 134/60 (01 Jun 2022 15:51) (122/65 - 174/62)  BP(mean): --  RR: 17 (01 Jun 2022 11:23) (15 - 17)  SpO2: 100% (01 Jun 2022 11:23) (98% - 100%)  I&O's Summary    MEDICATIONS  (STANDING):  aspirin enteric coated 81 milliGRAM(s) Oral daily  atorvastatin 80 milliGRAM(s) Oral at bedtime  BACItracin   Ointment 1 Application(s) Topical two times a day  cholecalciferol 1000 Unit(s) Oral daily  dextrose 5%. 1000 milliLiter(s) (50 mL/Hr) IV Continuous <Continuous>  dextrose 5%. 1000 milliLiter(s) (100 mL/Hr) IV Continuous <Continuous>  dextrose 50% Injectable 25 Gram(s) IV Push once  dextrose 50% Injectable 12.5 Gram(s) IV Push once  dextrose 50% Injectable 25 Gram(s) IV Push once  escitalopram 10 milliGRAM(s) Oral daily  furosemide    Tablet 20 milliGRAM(s) Oral daily  gabapentin 300 milliGRAM(s) Oral two times a day  glucagon  Injectable 1 milliGRAM(s) IntraMuscular once  heparin   Injectable 5000 Unit(s) SubCutaneous every 12 hours  insulin lispro (ADMELOG) corrective regimen sliding scale   SubCutaneous three times a day before meals  insulin lispro (ADMELOG) corrective regimen sliding scale   SubCutaneous at bedtime  pantoprazole    Tablet 40 milliGRAM(s) Oral before breakfast  sodium chloride 0.9%. 1000 milliLiter(s) (75 mL/Hr) IV Continuous <Continuous>  ticagrelor 90 milliGRAM(s) Oral every 12 hours    MEDICATIONS  (PRN):  acetaminophen     Tablet .. 650 milliGRAM(s) Oral every 6 hours PRN Temp greater or equal to 38C (100.4F), Mild Pain (1 - 3)  dextrose Oral Gel 15 Gram(s) Oral once PRN Blood Glucose LESS THAN 70 milliGRAM(s)/deciliter  melatonin 3 milliGRAM(s) Oral at bedtime PRN Insomnia    LABS:                        12.9   7.69  )-----------( 198      ( 01 Jun 2022 06:12 )             40.2     06-01    139  |  103  |  38<H>  ----------------------------<  196<H>  4.2   |  24  |  1.82<H>    Ca    9.4      01 Jun 2022 06:12  Phos  4.0     06-01  Mg     2.40     06-01    TPro  7.2  /  Alb  4.1  /  TBili  0.5  /  DBili  x   /  AST  22  /  ALT  26  /  AlkPhos  50  06-01    PT/INR - ( 31 May 2022 17:50 )   PT: 12.5 sec;   INR: 1.08 ratio         PTT - ( 31 May 2022 17:50 )  PTT:25.8 sec    CAPILLARY BLOOD GLUCOSE      POCT Blood Glucose.: 111 mg/dL (01 Jun 2022 17:27)  POCT Blood Glucose.: 194 mg/dL (01 Jun 2022 12:16)  POCT Blood Glucose.: 259 mg/dL (01 Jun 2022 11:02)  POCT Blood Glucose.: 267 mg/dL (01 Jun 2022 08:50)  POCT Blood Glucose.: 270 mg/dL (01 Jun 2022 08:45)  POCT Blood Glucose.: 346 mg/dL (01 Jun 2022 08:42)  POCT Blood Glucose.: 430 mg/dL (01 Jun 2022 08:36)          REVIEW OF SYSTEMS:  CONSTITUTIONAL: No fever, weight loss, or fatigue  EYES: No eye pain, visual disturbances, or discharge  ENMT:  No difficulty hearing, tinnitus, vertigo; No sinus or throat pain  NECK: No pain or stiffness  RESPIRATORY: No cough, wheezing, chills or hemoptysis; No shortness of breath  CARDIOVASCULAR: No chest pain, palpitations, dizziness, or leg swelling  GASTROINTESTINAL: No abdominal or epigastric pain. No nausea, vomiting, or hematemesis; No diarrhea or constipation. No melena or hematochezia.  GENITOURINARY: No dysuria, frequency, hematuria, or incontinence  NEUROLOGICAL: No headaches, memory loss, loss of strength, numbness, or tremors      Consultant(s) Notes Reviewed:  [x ] YES  [ ] NO    PHYSICAL EXAM:  GENERAL: NAD, well-groomed, well-developed, not in any distress ,  HEAD:  Atraumatic, Normocephalic  NECK: Supple, No JVD, Normal thyroid  NERVOUS SYSTEM:  Alert & Oriented X3, No focal deficit   CHEST/LUNG: Good air entry bilateral with no  rales, rhonchi, wheezing, or rubs  HEART: Regular rate and rhythm; No murmurs, rubs, or gallops  ABDOMEN: Soft, Nontender, Nondistended; Bowel sounds present  EXTREMITIES:  2+ Peripheral Pulses, No clubbing, cyanosis, or edema    Care Discussed with Consultants/Other Providers [ x] YES  [ ] NO

## 2022-06-02 LAB
ANION GAP SERPL CALC-SCNC: 11 MMOL/L — SIGNIFICANT CHANGE UP (ref 7–14)
BUN SERPL-MCNC: 30 MG/DL — HIGH (ref 7–23)
CALCIUM SERPL-MCNC: 8.8 MG/DL — SIGNIFICANT CHANGE UP (ref 8.4–10.5)
CHLORIDE SERPL-SCNC: 104 MMOL/L — SIGNIFICANT CHANGE UP (ref 98–107)
CO2 SERPL-SCNC: 22 MMOL/L — SIGNIFICANT CHANGE UP (ref 22–31)
CREAT SERPL-MCNC: 1.62 MG/DL — HIGH (ref 0.5–1.3)
EGFR: 41 ML/MIN/1.73M2 — LOW
GLUCOSE BLDC GLUCOMTR-MCNC: 105 MG/DL — HIGH (ref 70–99)
GLUCOSE BLDC GLUCOMTR-MCNC: 115 MG/DL — HIGH (ref 70–99)
GLUCOSE BLDC GLUCOMTR-MCNC: 181 MG/DL — HIGH (ref 70–99)
GLUCOSE BLDC GLUCOMTR-MCNC: 200 MG/DL — HIGH (ref 70–99)
GLUCOSE SERPL-MCNC: 99 MG/DL — SIGNIFICANT CHANGE UP (ref 70–99)
HCT VFR BLD CALC: 39.2 % — SIGNIFICANT CHANGE UP (ref 39–50)
HGB BLD-MCNC: 12.8 G/DL — LOW (ref 13–17)
MAGNESIUM SERPL-MCNC: 2.3 MG/DL — SIGNIFICANT CHANGE UP (ref 1.6–2.6)
MCHC RBC-ENTMCNC: 29.7 PG — SIGNIFICANT CHANGE UP (ref 27–34)
MCHC RBC-ENTMCNC: 32.7 GM/DL — SIGNIFICANT CHANGE UP (ref 32–36)
MCV RBC AUTO: 91 FL — SIGNIFICANT CHANGE UP (ref 80–100)
NRBC # BLD: 0 /100 WBCS — SIGNIFICANT CHANGE UP
NRBC # FLD: 0 K/UL — SIGNIFICANT CHANGE UP
PHOSPHATE SERPL-MCNC: 3.2 MG/DL — SIGNIFICANT CHANGE UP (ref 2.5–4.5)
PLATELET # BLD AUTO: 190 K/UL — SIGNIFICANT CHANGE UP (ref 150–400)
POTASSIUM SERPL-MCNC: 4 MMOL/L — SIGNIFICANT CHANGE UP (ref 3.5–5.3)
POTASSIUM SERPL-SCNC: 4 MMOL/L — SIGNIFICANT CHANGE UP (ref 3.5–5.3)
RBC # BLD: 4.31 M/UL — SIGNIFICANT CHANGE UP (ref 4.2–5.8)
RBC # FLD: 13 % — SIGNIFICANT CHANGE UP (ref 10.3–14.5)
SODIUM SERPL-SCNC: 137 MMOL/L — SIGNIFICANT CHANGE UP (ref 135–145)
VIT B12 SERPL-MCNC: 1078 PG/ML — HIGH (ref 200–900)
WBC # BLD: 7.34 K/UL — SIGNIFICANT CHANGE UP (ref 3.8–10.5)
WBC # FLD AUTO: 7.34 K/UL — SIGNIFICANT CHANGE UP (ref 3.8–10.5)

## 2022-06-02 PROCEDURE — 95816 EEG AWAKE AND DROWSY: CPT | Mod: 26

## 2022-06-02 PROCEDURE — 99221 1ST HOSP IP/OBS SF/LOW 40: CPT | Mod: GC

## 2022-06-02 RX ORDER — TICAGRELOR 90 MG/1
1 TABLET ORAL
Qty: 60 | Refills: 0
Start: 2022-06-02 | End: 2022-07-01

## 2022-06-02 RX ADMIN — Medication 650 MILLIGRAM(S): at 19:37

## 2022-06-02 RX ADMIN — ATORVASTATIN CALCIUM 80 MILLIGRAM(S): 80 TABLET, FILM COATED ORAL at 21:05

## 2022-06-02 RX ADMIN — Medication 650 MILLIGRAM(S): at 06:28

## 2022-06-02 RX ADMIN — GABAPENTIN 300 MILLIGRAM(S): 400 CAPSULE ORAL at 17:21

## 2022-06-02 RX ADMIN — Medication 1 APPLICATION(S): at 17:21

## 2022-06-02 RX ADMIN — TICAGRELOR 90 MILLIGRAM(S): 90 TABLET ORAL at 05:24

## 2022-06-02 RX ADMIN — HEPARIN SODIUM 5000 UNIT(S): 5000 INJECTION INTRAVENOUS; SUBCUTANEOUS at 05:23

## 2022-06-02 RX ADMIN — Medication 1: at 12:38

## 2022-06-02 RX ADMIN — INSULIN GLARGINE 20 UNIT(S): 100 INJECTION, SOLUTION SUBCUTANEOUS at 12:32

## 2022-06-02 RX ADMIN — HEPARIN SODIUM 5000 UNIT(S): 5000 INJECTION INTRAVENOUS; SUBCUTANEOUS at 17:21

## 2022-06-02 RX ADMIN — TICAGRELOR 90 MILLIGRAM(S): 90 TABLET ORAL at 17:21

## 2022-06-02 RX ADMIN — Medication 650 MILLIGRAM(S): at 05:28

## 2022-06-02 RX ADMIN — Medication 1 APPLICATION(S): at 05:22

## 2022-06-02 RX ADMIN — PANTOPRAZOLE SODIUM 40 MILLIGRAM(S): 20 TABLET, DELAYED RELEASE ORAL at 05:23

## 2022-06-02 RX ADMIN — GABAPENTIN 300 MILLIGRAM(S): 400 CAPSULE ORAL at 05:23

## 2022-06-02 RX ADMIN — Medication 650 MILLIGRAM(S): at 18:37

## 2022-06-02 NOTE — CONSULT NOTE ADULT - SUBJECTIVE AND OBJECTIVE BOX
Patient seen and evaluated at bedside    Chief Complaint: Syncope    HPI:  This is a 87yo Male with PMHx of CAD s/p CABG and recent PCI with GEMMA of proximal SVG to OM1 in-stent restenosis (5/26/22), T2DM, HTN, CKD, PVD with stent, who presented to ED for syncope. Was recently admitted for CHF exacerbation. Had abnormal stress test with subsequent PCI. Was discharged 5/27/22. Presented to ED yesterday for new onset syncopal episodes. Was sitting in his chair and would pass out for a few seconds. Happened three times on Sunday. Happened again when he was sitting outside on the stairs. Son found him down. No known hx of seizures. Denies lightheadedness. SBPs 90s at home. His PCP (daughter in law) stopped his Hydralazine recently.    EKG       PMHx:   Hyperlipemia  Hypertension  Coronary Artery Disease  Diabetes Mellitus Type II  Stented Coronary Artery  Neuropathy  Myocardial infarction  Stroke      PSHx:   History of Cataract Extraction  Hx of CABG  H/O coronary angiogram  S/P coronary artery stent placement      Allergies:  carbamazepine (Other)  Cipro (Unknown)  fluoroquinolone antibiotics (Other)  Tegretol (Unknown)      Current Medications:   acetaminophen     Tablet .. 650 milliGRAM(s) Oral every 6 hours PRN  aspirin enteric coated 81 milliGRAM(s) Oral daily  atorvastatin 80 milliGRAM(s) Oral at bedtime  BACItracin   Ointment 1 Application(s) Topical two times a day  cholecalciferol 1000 Unit(s) Oral daily  dextrose 5%. 1000 milliLiter(s) IV Continuous <Continuous>  dextrose 5%. 1000 milliLiter(s) IV Continuous <Continuous>  dextrose 50% Injectable 25 Gram(s) IV Push once  dextrose 50% Injectable 12.5 Gram(s) IV Push once  dextrose 50% Injectable 25 Gram(s) IV Push once  dextrose Oral Gel 15 Gram(s) Oral once PRN  escitalopram 10 milliGRAM(s) Oral daily  gabapentin 300 milliGRAM(s) Oral two times a day  glucagon  Injectable 1 milliGRAM(s) IntraMuscular once  heparin   Injectable 5000 Unit(s) SubCutaneous every 12 hours  insulin glargine Injectable (LANTUS) 20 Unit(s) SubCutaneous <User Schedule>  insulin lispro (ADMELOG) corrective regimen sliding scale   SubCutaneous three times a day before meals  insulin lispro (ADMELOG) corrective regimen sliding scale   SubCutaneous at bedtime  melatonin 3 milliGRAM(s) Oral at bedtime PRN  pantoprazole    Tablet 40 milliGRAM(s) Oral before breakfast  sodium chloride 0.9%. 1000 milliLiter(s) IV Continuous <Continuous>  ticagrelor 90 milliGRAM(s) Oral every 12 hours      FAMILY HISTORY:  No pertinent family history in first degree relatives      Social History:  Smoking History: denies  Alcohol Use: denies  Drug Use: denies     REVIEW OF SYSTEMS:  Constitutional:     [x ] negative [ ] fevers [ ] chills [ ] weight loss [ ] weight gain  HEENT:                  [x ] negative [ ] dry eyes [ ] eye irritation [ ] postnasal drip [ ] nasal congestion  CV:                         [ x] negative  [ ] chest pain [ ] orthopnea [ ] palpitations [ ] murmur  Resp:                     [x ] negative [ ] cough [ ] shortness of breath [ ] dyspnea [ ] wheezing [ ] sputum [ ]hemoptysis  GI:                          [ x] negative [ ] nausea [ ] vomiting [ ] diarrhea [ ] constipation [ ] abd pain [ ] dysphagia   :                        [ x] negative [ ] dysuria [ ] nocturia [ ] hematuria [ ] increased urinary frequency  Musculoskeletal: [x ] negative [ ] back pain [ ] myalgias [ ] arthralgias [ ] fracture  Skin:                       [ x] negative [ ] rash [ ] itch  Neurological:        [ ] negative [ ] headache [ ] dizziness [x ] syncope [ x] weakness [ ] numbness  Psychiatric:           [ x] negative [ ] anxiety [ ] depression  Endocrine:            [ x] negative [ ] diabetes [ ] thyroid problem  Heme/Lymph:      [ x] negative [ ] anemia [ ] bleeding problem  Allergic/Immune: [ x] negative [ ] itchy eyes [ ] nasal discharge [ ] hives [ ] angioedema    [ x] All other systems negative  [ ] Unable to assess ROS due to      Physical Exam:  T(F): 97.6 (06-02), Max: 98 (06-02)  HR: 66 (06-02) (66 - 77)  BP: 163/57 (06-02) (133/88 - 163/57)  RR: 18 (06-02)  SpO2: 100% (06-02)  GENERAL: No acute distress, well-developed  HEAD:  Atraumatic, Normocephalic  ENT: EOMI, conjunctiva and sclera clear, Neck supple, No JVD, moist mucosa  CHEST/LUNG: Clear to auscultation bilaterally; No wheeze, equal breath sounds bilaterally   HEART: Regular rate and rhythm; No murmurs, rubs, or gallops  ABDOMEN: Soft, Nontender, Nondistended; Bowel sounds present  EXTREMITIES:  No edema  PSYCH: Nl behavior, nl affect  NEUROLOGY: AAOx3, non-focal, cranial nerves intact      Cardiovascular Diagnostic Testing:    ECG: Personally reviewed:      Echo: Personally reviewed:  TTE 5/23/22:  CONCLUSIONS:  1. Mitral annular calcification, otherwise normal mitral  valve. Mild-moderate mitral regurgitation.  2. Aortic valve leaflet morphology not well visualized.  Peak transaortic valve gradient equals 18 mm Hg, mean  transaortic valve gradient equals 10 mm Hg, consistent with  mild aortic stenosis. Minimal aortic regurgitation.  3. Mildly dilated left atrium.  LA volume index = 41 cc/m2.  4. Normal left ventricular internal dimensions and wall  thicknesses.  5. Endocardium not well visualized; grossly normal left  ventricular systolic function.  6. Mild diastolic dysfunction (Stage I).  7. The right ventricle is not well visualized;grossly  normal right ventricular systolic function.      Stress Testing:  Nuclear Stress 5/23/22:  IMPRESSIONS:Abnormal Study  * Myocardial Perfusion SPECT results are abnormal.  * There are large, severe defects in inferior and  inferolateral walls that is predominantly reversible,  consistent with infarct with significant mateo-infarct  ischemia.There is a medium sized, moderate defect in  anterior wall that is reversible, consistent with  ischemia.  * Post-stress gated wall motion analysis was performed  (LVEF = 32 %;LVEDV = 90 ml.), revealing severe hypokinesis  in the inferior and inferolateral walls.  *** No previous Nuclear/Stress exam.      Cath:  [facility] 5/26/22:  Conclusions:   Significant restenosis in proximal segment of SVG to OM1 (prior stent  and recent POBA for restenosis). Patent LIMA to mid  LAD. RCA  with collaterals from LAD septals.     Successful PCI with GEMMA of proximal SVG to OM1 lesion (in-stent  restenosis).    Recommendations:   Continue dual antiplatelet therapy for 6 months (aspirin and  ticagrelor).      Imaging:    CXR: Personally reviewed    Labs: Personally reviewed                        12.8   7.34  )-----------( 190      ( 02 Jun 2022 06:30 )             39.2     06-02    137  |  104  |  30<H>  ----------------------------<  99  4.0   |  22  |  1.62<H>    Ca    8.8      02 Jun 2022 06:30  Phos  3.2     06-02  Mg     2.30     06-02    TPro  7.2  /  Alb  4.1  /  TBili  0.5  /  DBili  x   /  AST  22  /  ALT  26  /  AlkPhos  50  06-01    PT/INR - ( 31 May 2022 17:50 )   PT: 12.5 sec;   INR: 1.08 ratio         PTT - ( 31 May 2022 17:50 )  PTT:25.8 sec  Serum Pro-Brain Natriuretic Peptide: 858 pg/mL (05-31 @ 17:50)         Patient seen and evaluated at bedside    Chief Complaint: Syncope    HPI:  This is a 89yo Male with PMHx of CAD s/p CABG and recent PCI with GEMMA of proximal SVG to OM1 in-stent restenosis (5/26/22), T2DM, HTN, CKD, PVD with stent, who presented to ED for syncope. Was recently admitted for CHF exacerbation. Had abnormal stress test with subsequent PCI. Was discharged 5/27/22. Presented to ED yesterday for new onset syncopal episodes. Was sitting in his chair and would pass out for a few seconds. Happened three times on Sunday. Happened again when he was sitting outside on the stairs. Son found him down. No known hx of seizures. Denies lightheadedness. SBPs 90s at home. His PCP (daughter in law) stopped his Hydralazine recently. Here found to be orthostatics positive, given IVF bolus. On Coreg at home, has been held here.     EKG 1st degree AV block, bifascicular block.    Telemetry with 1st degree AV block with HRs down to 40-50s at times.     Currently feels well. Denies chest pain, palpitations, shortness of breath, orthopnea, PND sxs, lower extremity edema. No episodes of syncope so far inpatient per patient.     Complaint with DAPT with no missed doses. hsTrop 47 -> 47.    Medicine team wanted Cardiology teaching service to follow.      PMHx:   Hyperlipemia  Hypertension  Coronary Artery Disease  Diabetes Mellitus Type II  Stented Coronary Artery  Neuropathy  Myocardial infarction  Stroke      PSHx:   History of Cataract Extraction  Hx of CABG  H/O coronary angiogram  S/P coronary artery stent placement      Allergies:  carbamazepine (Other)  Cipro (Unknown)  fluoroquinolone antibiotics (Other)  Tegretol (Unknown)      Current Medications:   acetaminophen     Tablet .. 650 milliGRAM(s) Oral every 6 hours PRN  aspirin enteric coated 81 milliGRAM(s) Oral daily  atorvastatin 80 milliGRAM(s) Oral at bedtime  BACItracin   Ointment 1 Application(s) Topical two times a day  cholecalciferol 1000 Unit(s) Oral daily  dextrose 5%. 1000 milliLiter(s) IV Continuous <Continuous>  dextrose 5%. 1000 milliLiter(s) IV Continuous <Continuous>  dextrose 50% Injectable 25 Gram(s) IV Push once  dextrose 50% Injectable 12.5 Gram(s) IV Push once  dextrose 50% Injectable 25 Gram(s) IV Push once  dextrose Oral Gel 15 Gram(s) Oral once PRN  escitalopram 10 milliGRAM(s) Oral daily  gabapentin 300 milliGRAM(s) Oral two times a day  glucagon  Injectable 1 milliGRAM(s) IntraMuscular once  heparin   Injectable 5000 Unit(s) SubCutaneous every 12 hours  insulin glargine Injectable (LANTUS) 20 Unit(s) SubCutaneous <User Schedule>  insulin lispro (ADMELOG) corrective regimen sliding scale   SubCutaneous three times a day before meals  insulin lispro (ADMELOG) corrective regimen sliding scale   SubCutaneous at bedtime  melatonin 3 milliGRAM(s) Oral at bedtime PRN  pantoprazole    Tablet 40 milliGRAM(s) Oral before breakfast  sodium chloride 0.9%. 1000 milliLiter(s) IV Continuous <Continuous>  ticagrelor 90 milliGRAM(s) Oral every 12 hours      FAMILY HISTORY:  No pertinent family history in first degree relatives      Social History:  Smoking History: denies  Alcohol Use: denies  Drug Use: denies     REVIEW OF SYSTEMS:  Constitutional:     [x ] negative [ ] fevers [ ] chills [ ] weight loss [ ] weight gain  HEENT:                  [x ] negative [ ] dry eyes [ ] eye irritation [ ] postnasal drip [ ] nasal congestion  CV:                         [ x] negative  [ ] chest pain [ ] orthopnea [ ] palpitations [ ] murmur  Resp:                     [x ] negative [ ] cough [ ] shortness of breath [ ] dyspnea [ ] wheezing [ ] sputum [ ]hemoptysis  GI:                          [ x] negative [ ] nausea [ ] vomiting [ ] diarrhea [ ] constipation [ ] abd pain [ ] dysphagia   :                        [ x] negative [ ] dysuria [ ] nocturia [ ] hematuria [ ] increased urinary frequency  Musculoskeletal: [x ] negative [ ] back pain [ ] myalgias [ ] arthralgias [ ] fracture  Skin:                       [ x] negative [ ] rash [ ] itch  Neurological:        [ ] negative [ ] headache [ ] dizziness [x ] syncope [ x] weakness [ ] numbness  Psychiatric:           [ x] negative [ ] anxiety [ ] depression  Endocrine:            [ x] negative [ ] diabetes [ ] thyroid problem  Heme/Lymph:      [ x] negative [ ] anemia [ ] bleeding problem  Allergic/Immune: [ x] negative [ ] itchy eyes [ ] nasal discharge [ ] hives [ ] angioedema    [ x] All other systems negative  [ ] Unable to assess ROS due to      Physical Exam:  T(F): 97.6 (06-02), Max: 98 (06-02)  HR: 66 (06-02) (66 - 77)  BP: 163/57 (06-02) (133/88 - 163/57)  RR: 18 (06-02)  SpO2: 100% (06-02)  GENERAL: No acute distress, well-developed  HEAD:  Atraumatic, Normocephalic  ENT: EOMI, conjunctiva and sclera clear, Neck supple, No JVD, moist mucosa  CHEST/LUNG: Clear to auscultation bilaterally; No wheeze, equal breath sounds bilaterally   HEART: Regular rate and rhythm; No murmurs, rubs, or gallops  ABDOMEN: Soft, Nontender, Nondistended; Bowel sounds present  EXTREMITIES:  No edema  PSYCH: Nl behavior, nl affect  NEUROLOGY: AAOx3, non-focal, cranial nerves intact      Cardiovascular Diagnostic Testing:    ECG: Personally reviewed:  EKG 1st degree AV block, bifascicular block.    Echo: Personally reviewed:  TTE 5/23/22:  CONCLUSIONS:  1. Mitral annular calcification, otherwise normal mitral  valve. Mild-moderate mitral regurgitation.  2. Aortic valve leaflet morphology not well visualized.  Peak transaortic valve gradient equals 18 mm Hg, mean  transaortic valve gradient equals 10 mm Hg, consistent with  mild aortic stenosis. Minimal aortic regurgitation.  3. Mildly dilated left atrium.  LA volume index = 41 cc/m2.  4. Normal left ventricular internal dimensions and wall  thicknesses.  5. Endocardium not well visualized; grossly normal left  ventricular systolic function.  6. Mild diastolic dysfunction (Stage I).  7. The right ventricle is not well visualized;grossly  normal right ventricular systolic function.      Stress Testing:  Nuclear Stress 5/23/22:  IMPRESSIONS:Abnormal Study  * Myocardial Perfusion SPECT results are abnormal.  * There are large, severe defects in inferior and  inferolateral walls that is predominantly reversible,  consistent with infarct with significant mateo-infarct  ischemia.There is a medium sized, moderate defect in  anterior wall that is reversible, consistent with  ischemia.  * Post-stress gated wall motion analysis was performed  (LVEF = 32 %;LVEDV = 90 ml.), revealing severe hypokinesis  in the inferior and inferolateral walls.  *** No previous Nuclear/Stress exam.      Cath:  [facility] 5/26/22:  Conclusions:   Significant restenosis in proximal segment of SVG to OM1 (prior stent  and recent POBA for restenosis). Patent LIMA to mid  LAD. RCA  with collaterals from LAD septals.     Successful PCI with GEMMA of proximal SVG to OM1 lesion (in-stent  restenosis).    Recommendations:   Continue dual antiplatelet therapy for 6 months (aspirin and  ticagrelor).      Imaging:    CXR: Personally reviewed    Labs: Personally reviewed                        12.8   7.34  )-----------( 190      ( 02 Jun 2022 06:30 )             39.2     06-02    137  |  104  |  30<H>  ----------------------------<  99  4.0   |  22  |  1.62<H>    Ca    8.8      02 Jun 2022 06:30  Phos  3.2     06-02  Mg     2.30     06-02    TPro  7.2  /  Alb  4.1  /  TBili  0.5  /  DBili  x   /  AST  22  /  ALT  26  /  AlkPhos  50  06-01    PT/INR - ( 31 May 2022 17:50 )   PT: 12.5 sec;   INR: 1.08 ratio         PTT - ( 31 May 2022 17:50 )  PTT:25.8 sec  Serum Pro-Brain Natriuretic Peptide: 858 pg/mL (05-31 @ 17:50)

## 2022-06-02 NOTE — ADVANCED PRACTICE NURSE CONSULT - REASON FOR CONSULT
Patient seen on skin care rounds after wound care referral received for assessment of skin impairment and recommendations of topical management. Chart reviewed: WBC 7.34, H/H 12.8/39.2, platelets 190, INR 1.08, Serum albumin 4.1, a1c 7.5%, BMI 23.6, Reinaldo 21, son interviewed at bedside (practicing neurologist) stating patient had 8 falls within the last 3 days. Last fall was from the porch which left him with multiple abrasions. Patient H/O of CAD s/p CABG, stent recently in 5/2022, HTN, DM2, CKD, PVD with stent presented to the ED for new onset syncopal episodes. The patient was recently diagnosed with CHF exacerbation on the previous admission and was given a stent because of an abnormal stress test. Patient seen by Neurology for syncopal episodes.

## 2022-06-02 NOTE — EEG REPORT - NS EEG TEXT BOX
[facility]   COMPREHENSIVE EPILEPSY CENTER   REPORT OF ROUTINE VIDEO EEG     [facility]: [address], [place] [address], Ph#: [phone]  [place]:  [address] [place] [address], Ph#: [phone]  H: [address] [place] [address], Ph#: [phone]    Patient Name: SHAIK CHARANJIT  Age and : 88y ([birth year])  MRN #: [id number]  Location: [address]  Referring Physician: Horacio Dinero    Study Date: 22    _____________________________________________________________  TECHNICAL INFORMATION    Placement and Labeling of Electrodes:  The EEG was performed utilizing 20 channels referential EEG connections (coronal over temporal over parasagittal montage) using all standard 10-20 electrode placements with EKG.  Recording was at a sampling rate of 256 samples per second per channel.  Time synchronized digital video recording was done simultaneously with EEG recording.  A low light infrared camera was used for low light recording.  Jignesh and seizure detection algorithms were utilized.    _____________________________________________________________  HISTORY    Patient is a 88y old  Male who presents with a chief complaint of syncope (2022 12:43)    PERTINENT MEDICATION:  gabapentin 300 milliGRAM(s) Oral two times a day    _____________________________________________________________  STUDY INTERPRETATION    Findings: The background was continuous, spontaneously variable and reactive. During wakefulness, the posterior dominant rhythm consisted of symmetric, well-modulated 8 Hz activity, with amplitude to 30 uV, that attenuated to eye opening.  Low amplitude frontal beta was noted in wakefulness.    Background Slowing:  No generalized background slowing was present.    Focal Slowing:   None were present.    Sleep Background:  Drowsiness was characterized by fragmentation, attenuation, and slowing of the background activity.    Stage II sleep transients were not recorded.    Other Non-Epileptiform Findings:  None were present.    Interictal Epileptiform Activity:   None were present.    Events:  Clinical events: None recorded.  Seizures: None recorded.    Activation Procedures:   Hyperventilation was not performed.    Photic stimulation was performed and did not elicit any abnormality.     Artifacts:  Intermittent myogenic and movement artifacts were noted.    ECG:  The heart rate on single channel ECG was limited by artifact  _____________________________________________________________  EEG SUMMARY/CLASSIFICATION    Normal EEG in the awake, drowsy states.  _____________________________________________________________  EEG IMPRESSION/CLINICAL CORRELATE    Normal EEG study.  No epileptiform pattern or seizure seen.    Yovani Wiley MD PGY-5  Epilepsy Fellow    This Preliminary report is based on fellow review. Final report pending attending review.    Reading Room: [phone]  On Call Service After Hours: [phone]   [facility]   COMPREHENSIVE EPILEPSY CENTER   REPORT OF ROUTINE VIDEO EEG     [facility]: [address], [place] [address], Ph#: [phone]  [place]:  [address] [place] [address], Ph#: [phone]  H: [address] [place] [address], Ph#: [phone]    Patient Name: SHAIK CHARANJIT  Age and : 88y ([birth year])  MRN #: [id number]  Location: [address]  Referring Physician: Horacio Dinero    Study Date: 22    _____________________________________________________________  TECHNICAL INFORMATION    Placement and Labeling of Electrodes:  The EEG was performed utilizing 20 channels referential EEG connections (coronal over temporal over parasagittal montage) using all standard 10-20 electrode placements with EKG.  Recording was at a sampling rate of 256 samples per second per channel.  Time synchronized digital video recording was done simultaneously with EEG recording.  A low light infrared camera was used for low light recording.  Jignesh and seizure detection algorithms were utilized.    _____________________________________________________________  HISTORY    Patient is a 88y old  Male who presents with a chief complaint of syncope (2022 12:43)    PERTINENT MEDICATION:  gabapentin 300 milliGRAM(s) Oral two times a day    _____________________________________________________________  STUDY INTERPRETATION    Findings: The background was continuous, spontaneously variable and reactive. During wakefulness, the posterior dominant rhythm consisted of symmetric, well-modulated 8 Hz activity, with amplitude to 30 uV, that attenuated to eye opening.  Low amplitude frontal beta was noted in wakefulness.    Background Slowing:  No generalized background slowing was present.    Focal Slowing:   None were present.    Sleep Background:  Drowsiness was characterized by fragmentation, attenuation, and slowing of the background activity.    Stage II sleep transients were not recorded.    Other Non-Epileptiform Findings:  None were present.    Interictal Epileptiform Activity:   None were present.    Events:  Clinical events: None recorded.  Seizures: None recorded.    Activation Procedures:   Hyperventilation was not performed.    Photic stimulation was performed and did not elicit any abnormality.     Artifacts:  Intermittent myogenic and movement artifacts were noted.    ECG:  The heart rate on single channel ECG was limited by artifact  _____________________________________________________________  EEG SUMMARY/CLASSIFICATION    Normal EEG in the awake, drowsy states.  _____________________________________________________________  EEG IMPRESSION/CLINICAL CORRELATE    Normal EEG study.  No epileptiform pattern or seizure seen.    Yovani Wiley MD PGY-5  Epilepsy Fellow    Reading Room: [phone]  On Call Service After Hours: [phone]    Julio Sorensen MD  Neurology Attending Physician

## 2022-06-02 NOTE — ADVANCED PRACTICE NURSE CONSULT - ASSESSMENT
General: A&Ox4, ambulates at baseline, continent of urine and stool. Skin warm, dry. Ecchymosis to left elbow.      Multiple abrasions presenting as scabs to Right wrist, left elbow, right shin and left foot.     Vascular: Bilateral lower extremities with scattered areas of hyper and hypopigmentation. Reports neuropathy to bilateral feet, (+) sensation. (+) DP/PT pulses. No edema, increased coolness from midfoot to toes.     Left foot abrasions to  2nd, 3rd, 4th and 5th toes and dorsal foot with circumferential erythema extending 0.5cm-1cm, no increased warmth, no induration. Goals of care: antibacterial protection as patient with hx of diabetes.

## 2022-06-02 NOTE — CONSULT NOTE ADULT - ASSESSMENT
This is a 89yo Male with PMHx of CAD s/p CABG and recent PCI with GEMMA of proximal SVG to OM1 in-stent restenosis (5/26/22), T2DM, HTN, CKD, PVD with stent, who presented to ED for syncope. Found to have positive orthostatics. EKG        This is a 87yo Male with PMHx of CAD s/p CABG and recent PCI with GEMMA of proximal SVG to OM1 in-stent restenosis (5/26/22), T2DM, HTN, CKD, PVD with stent, who presented to ED for syncope.     1. Syncope  - Positive orthostatics s/p IVF bolus  - Telemetry with 1st degree AV block with episodes of sinus bradycardia   - EKG 1st degree AV block, bifascicular block  - Continue to hold AV myron blocking meds (on Carvedilol 6.25mg BID outpatient)  - Please consult EP to evaluate for conduction disease leading to syncope  - Monitor on Telemetry     2. CAD s/p CABG and recent PCI with GEMMA of proximal SVG to OM1 in-stent restenosis (5/26/22)  - EKG without ischemic changes, hsTrop negative x2, currently asymptomatic. Low suspicion for ACS at this time  - TTE 5/23/22 with nl LV function, mild-mod MR, mild AS, stage I diastolic dysfunction. Do not need to repeat.   - Continue Aspirin 81mg, Brilinta 90mg BID, high intensity statin       Adolfo Perales MD  Cardiology Fellow - PGY 4  Text or Call: [phone]  For all New Consults and Questions:  [email or web address]   Login: [email or web address]

## 2022-06-02 NOTE — PROGRESS NOTE ADULT - SUBJECTIVE AND OBJECTIVE BOX
Date of Service  : 06-02-22     INTERVAL HPI/OVERNIGHT EVENTS: Seen and examined . I feel fine. Walked to bathroom okay .   Vital Signs Last 24 Hrs  T(C): 36.4 (02 Jun 2022 11:10), Max: 36.7 (02 Jun 2022 05:20)  T(F): 97.6 (02 Jun 2022 11:10), Max: 98 (02 Jun 2022 05:20)  HR: 66 (02 Jun 2022 11:10) (66 - 77)  BP: 163/57 (02 Jun 2022 11:10) (133/88 - 163/57)  BP(mean): --  RR: 18 (02 Jun 2022 11:10) (18 - 18)  SpO2: 100% (02 Jun 2022 11:10) (100% - 100%)  I&O's Summary    01 Jun 2022 07:01  -  02 Jun 2022 07:00  --------------------------------------------------------  IN: 320 mL / OUT: 500 mL / NET: -180 mL      MEDICATIONS  (STANDING):  aspirin enteric coated 81 milliGRAM(s) Oral daily  atorvastatin 80 milliGRAM(s) Oral at bedtime  BACItracin   Ointment 1 Application(s) Topical two times a day  cholecalciferol 1000 Unit(s) Oral daily  dextrose 5%. 1000 milliLiter(s) (50 mL/Hr) IV Continuous <Continuous>  dextrose 5%. 1000 milliLiter(s) (100 mL/Hr) IV Continuous <Continuous>  dextrose 50% Injectable 25 Gram(s) IV Push once  dextrose 50% Injectable 12.5 Gram(s) IV Push once  dextrose 50% Injectable 25 Gram(s) IV Push once  escitalopram 10 milliGRAM(s) Oral daily  gabapentin 300 milliGRAM(s) Oral two times a day  glucagon  Injectable 1 milliGRAM(s) IntraMuscular once  heparin   Injectable 5000 Unit(s) SubCutaneous every 12 hours  insulin glargine Injectable (LANTUS) 20 Unit(s) SubCutaneous <User Schedule>  insulin lispro (ADMELOG) corrective regimen sliding scale   SubCutaneous three times a day before meals  insulin lispro (ADMELOG) corrective regimen sliding scale   SubCutaneous at bedtime  pantoprazole    Tablet 40 milliGRAM(s) Oral before breakfast  sodium chloride 0.9%. 1000 milliLiter(s) (75 mL/Hr) IV Continuous <Continuous>  ticagrelor 90 milliGRAM(s) Oral every 12 hours    MEDICATIONS  (PRN):  acetaminophen     Tablet .. 650 milliGRAM(s) Oral every 6 hours PRN Temp greater or equal to 38C (100.4F), Mild Pain (1 - 3)  dextrose Oral Gel 15 Gram(s) Oral once PRN Blood Glucose LESS THAN 70 milliGRAM(s)/deciliter  melatonin 3 milliGRAM(s) Oral at bedtime PRN Insomnia    LABS:                        12.8   7.34  )-----------( 190      ( 02 Jun 2022 06:30 )             39.2     06-02    137  |  104  |  30<H>  ----------------------------<  99  4.0   |  22  |  1.62<H>    Ca    8.8      02 Jun 2022 06:30  Phos  3.2     06-02  Mg     2.30     06-02    TPro  7.2  /  Alb  4.1  /  TBili  0.5  /  DBili  x   /  AST  22  /  ALT  26  /  AlkPhos  50  06-01        CAPILLARY BLOOD GLUCOSE      POCT Blood Glucose.: 115 mg/dL (02 Jun 2022 17:15)  POCT Blood Glucose.: 200 mg/dL (02 Jun 2022 12:17)  POCT Blood Glucose.: 105 mg/dL (02 Jun 2022 08:22)  POCT Blood Glucose.: 220 mg/dL (01 Jun 2022 21:58)          REVIEW OF SYSTEMS:  CONSTITUTIONAL: No fever, weight loss, or fatigue  EYES: No eye pain, visual disturbances, or discharge  ENMT:  No difficulty hearing, tinnitus, vertigo; No sinus or throat pain  NECK: No pain or stiffness  RESPIRATORY: No cough, wheezing, chills or hemoptysis; No shortness of breath  CARDIOVASCULAR: No chest pain, palpitations, dizziness, or leg swelling  GASTROINTESTINAL: No abdominal or epigastric pain. No nausea, vomiting, or hematemesis; No diarrhea or constipation. No melena or hematochezia.  GENITOURINARY: No dysuria, frequency, hematuria, or incontinence  NEUROLOGICAL: No headaches, memory loss, loss of strength, numbness, or tremors    RADIOLOGY & ADDITIONAL TESTS:    Consultant(s) Notes Reviewed:  [x ] YES  [ ] NO    PHYSICAL EXAM:  GENERAL: NAD, well-groomed, well-developed,not in any distress ,  HEAD:  Atraumatic, Normocephalic  NECK: Supple, No JVD, Normal thyroid  NERVOUS SYSTEM:  Alert & Oriented X3, No focal deficit   CHEST/LUNG: Good air entry bilateral with no  rales, rhonchi, wheezing, or rubs  HEART: Regular rate and rhythm; No murmurs, rubs, or gallops  ABDOMEN: Soft, Nontender, Nondistended; Bowel sounds present  EXTREMITIES:  2+ Peripheral Pulses, No clubbing, cyanosis, or edema  SKIN: No rashes or lesions    Care Discussed with Consultants/Other Providers [ x] YES  [ ] NO

## 2022-06-02 NOTE — PROGRESS NOTE ADULT - ASSESSMENT
87 y/o M with pmhx of CAD s/p CABG, stent recently in 5/2022, HTN, DM2, CKD, PVD with stent presented to the ED for new onset syncopal episodes. Admit for syncope work up needing telemetry.     Problem/Plan - 1:  ·  Problem: Syncope.   ·  Plan: Assessment:  - patient has multiple episodes of syncope at home  - unclear etiology: no signs of lightheadedness before the episodes which could suspect orthostatic hypotension. However the patient is on multiple BP medications  - no hx of seizures  - no chest pain, Troponins neg x1, EKG shows RBBB and 1st degree AV block which is similar to previous EKG  - Echo from 5/2022 - normal EF, mild aortic stenosis  - orthostatic positive.   - hold BP meds  - does not need a repeat echo as it was done 2 weeks ago  - cardiology consulted,   - Neurology help appreciated. MRI pending.   - EEG normal .      Problem/Plan - 2:  ·  Problem: Fall.   ·  Plan: Assessment:  - patient fell on concrete while sitting on the stairs  - No head trauma, but has skin scrapes on foot and elbow    Plan:  - PT eval  - evaluate for etiology for fall as above.     Problem/Plan - 3:  ·  Problem: Benign essential HTN.   ·  Plan: - hold BP meds for now for syncope work up, will give one dose of norvasc for now for HTN.     Problem/Plan - 4:  ·  Problem: DM2 (diabetes mellitus, type 2).   ·  Plan: - 10U lantus QHs.     Problem/Plan - 5:  ·  Problem: CAD (coronary artery disease).   ·  Plan: - c/w DAPT.     Problem/Plan - 6:  ·  Problem: Stage 3 chronic kidney disease.   ·  Plan: - monitor for now.     Problem/Plan - 7:  ·  Problem: Prophylactic measure.   ·  Plan: - heparin subq for dvt ppx.    Dispo : DC planning pending above.

## 2022-06-02 NOTE — ADVANCED PRACTICE NURSE CONSULT - RECOMMEDATIONS
Topical recommendations:    Left foot, Right wrist, left elbow, right shin- apply Bacitracin twice a day as per MD order.     Plan discussed with patient and patients son. Patient educated on topical wound therapy  to optimize wound healing. Questions answered.     Please contact Wound/Ostomy Care Service Line if we can be of further assistance (ext [phone]).

## 2022-06-03 LAB
ANION GAP SERPL CALC-SCNC: 12 MMOL/L — SIGNIFICANT CHANGE UP (ref 7–14)
BASOPHILS # BLD AUTO: 0.05 K/UL — SIGNIFICANT CHANGE UP (ref 0–0.2)
BASOPHILS NFR BLD AUTO: 0.7 % — SIGNIFICANT CHANGE UP (ref 0–2)
BUN SERPL-MCNC: 26 MG/DL — HIGH (ref 7–23)
CALCIUM SERPL-MCNC: 9.1 MG/DL — SIGNIFICANT CHANGE UP (ref 8.4–10.5)
CHLORIDE SERPL-SCNC: 102 MMOL/L — SIGNIFICANT CHANGE UP (ref 98–107)
CO2 SERPL-SCNC: 21 MMOL/L — LOW (ref 22–31)
CREAT SERPL-MCNC: 1.55 MG/DL — HIGH (ref 0.5–1.3)
EGFR: 43 ML/MIN/1.73M2 — LOW
EOSINOPHIL # BLD AUTO: 0.33 K/UL — SIGNIFICANT CHANGE UP (ref 0–0.5)
EOSINOPHIL NFR BLD AUTO: 4.4 % — SIGNIFICANT CHANGE UP (ref 0–6)
GLUCOSE BLDC GLUCOMTR-MCNC: 133 MG/DL — HIGH (ref 70–99)
GLUCOSE BLDC GLUCOMTR-MCNC: 134 MG/DL — HIGH (ref 70–99)
GLUCOSE BLDC GLUCOMTR-MCNC: 141 MG/DL — HIGH (ref 70–99)
GLUCOSE BLDC GLUCOMTR-MCNC: 226 MG/DL — HIGH (ref 70–99)
GLUCOSE SERPL-MCNC: 91 MG/DL — SIGNIFICANT CHANGE UP (ref 70–99)
HCT VFR BLD CALC: 41.6 % — SIGNIFICANT CHANGE UP (ref 39–50)
HGB BLD-MCNC: 13.5 G/DL — SIGNIFICANT CHANGE UP (ref 13–17)
IANC: 4.56 K/UL — SIGNIFICANT CHANGE UP (ref 1.8–7.4)
IMM GRANULOCYTES NFR BLD AUTO: 0.4 % — SIGNIFICANT CHANGE UP (ref 0–1.5)
LYMPHOCYTES # BLD AUTO: 1.87 K/UL — SIGNIFICANT CHANGE UP (ref 1–3.3)
LYMPHOCYTES # BLD AUTO: 24.8 % — SIGNIFICANT CHANGE UP (ref 13–44)
MAGNESIUM SERPL-MCNC: 2.1 MG/DL — SIGNIFICANT CHANGE UP (ref 1.6–2.6)
MCHC RBC-ENTMCNC: 30 PG — SIGNIFICANT CHANGE UP (ref 27–34)
MCHC RBC-ENTMCNC: 32.5 GM/DL — SIGNIFICANT CHANGE UP (ref 32–36)
MCV RBC AUTO: 92.4 FL — SIGNIFICANT CHANGE UP (ref 80–100)
MONOCYTES # BLD AUTO: 0.71 K/UL — SIGNIFICANT CHANGE UP (ref 0–0.9)
MONOCYTES NFR BLD AUTO: 9.4 % — SIGNIFICANT CHANGE UP (ref 2–14)
NEUTROPHILS # BLD AUTO: 4.56 K/UL — SIGNIFICANT CHANGE UP (ref 1.8–7.4)
NEUTROPHILS NFR BLD AUTO: 60.3 % — SIGNIFICANT CHANGE UP (ref 43–77)
NRBC # BLD: 0 /100 WBCS — SIGNIFICANT CHANGE UP
NRBC # FLD: 0 K/UL — SIGNIFICANT CHANGE UP
PHOSPHATE SERPL-MCNC: 2.9 MG/DL — SIGNIFICANT CHANGE UP (ref 2.5–4.5)
PLATELET # BLD AUTO: 194 K/UL — SIGNIFICANT CHANGE UP (ref 150–400)
POTASSIUM SERPL-MCNC: 4.1 MMOL/L — SIGNIFICANT CHANGE UP (ref 3.5–5.3)
POTASSIUM SERPL-SCNC: 4.1 MMOL/L — SIGNIFICANT CHANGE UP (ref 3.5–5.3)
RBC # BLD: 4.5 M/UL — SIGNIFICANT CHANGE UP (ref 4.2–5.8)
RBC # FLD: 13 % — SIGNIFICANT CHANGE UP (ref 10.3–14.5)
SODIUM SERPL-SCNC: 135 MMOL/L — SIGNIFICANT CHANGE UP (ref 135–145)
WBC # BLD: 7.55 K/UL — SIGNIFICANT CHANGE UP (ref 3.8–10.5)
WBC # FLD AUTO: 7.55 K/UL — SIGNIFICANT CHANGE UP (ref 3.8–10.5)

## 2022-06-03 PROCEDURE — 70549 MR ANGIOGRAPH NECK W/O&W/DYE: CPT | Mod: 26

## 2022-06-03 PROCEDURE — 99233 SBSQ HOSP IP/OBS HIGH 50: CPT

## 2022-06-03 PROCEDURE — 72156 MRI NECK SPINE W/O & W/DYE: CPT | Mod: 26

## 2022-06-03 PROCEDURE — 99232 SBSQ HOSP IP/OBS MODERATE 35: CPT | Mod: GC

## 2022-06-03 PROCEDURE — 70551 MRI BRAIN STEM W/O DYE: CPT | Mod: 26,59

## 2022-06-03 PROCEDURE — 70546 MR ANGIOGRAPH HEAD W/O&W/DYE: CPT | Mod: 26

## 2022-06-03 RX ORDER — SODIUM CHLORIDE 9 MG/ML
1000 INJECTION INTRAMUSCULAR; INTRAVENOUS; SUBCUTANEOUS
Refills: 0 | Status: DISCONTINUED | OUTPATIENT
Start: 2022-06-03 | End: 2022-06-08

## 2022-06-03 RX ADMIN — Medication 650 MILLIGRAM(S): at 06:17

## 2022-06-03 RX ADMIN — PANTOPRAZOLE SODIUM 40 MILLIGRAM(S): 20 TABLET, DELAYED RELEASE ORAL at 05:18

## 2022-06-03 RX ADMIN — GABAPENTIN 300 MILLIGRAM(S): 400 CAPSULE ORAL at 05:18

## 2022-06-03 RX ADMIN — INSULIN GLARGINE 20 UNIT(S): 100 INJECTION, SOLUTION SUBCUTANEOUS at 13:10

## 2022-06-03 RX ADMIN — ESCITALOPRAM OXALATE 10 MILLIGRAM(S): 10 TABLET, FILM COATED ORAL at 11:24

## 2022-06-03 RX ADMIN — HEPARIN SODIUM 5000 UNIT(S): 5000 INJECTION INTRAVENOUS; SUBCUTANEOUS at 18:52

## 2022-06-03 RX ADMIN — Medication 81 MILLIGRAM(S): at 11:24

## 2022-06-03 RX ADMIN — Medication 1 APPLICATION(S): at 05:19

## 2022-06-03 RX ADMIN — SODIUM CHLORIDE 100 MILLILITER(S): 9 INJECTION INTRAMUSCULAR; INTRAVENOUS; SUBCUTANEOUS at 18:47

## 2022-06-03 RX ADMIN — TICAGRELOR 90 MILLIGRAM(S): 90 TABLET ORAL at 18:53

## 2022-06-03 RX ADMIN — Medication 2: at 13:14

## 2022-06-03 RX ADMIN — TICAGRELOR 90 MILLIGRAM(S): 90 TABLET ORAL at 05:18

## 2022-06-03 RX ADMIN — Medication 650 MILLIGRAM(S): at 11:18

## 2022-06-03 RX ADMIN — Medication 1 APPLICATION(S): at 14:00

## 2022-06-03 RX ADMIN — Medication 1000 UNIT(S): at 11:24

## 2022-06-03 RX ADMIN — Medication 650 MILLIGRAM(S): at 05:17

## 2022-06-03 RX ADMIN — GABAPENTIN 300 MILLIGRAM(S): 400 CAPSULE ORAL at 18:52

## 2022-06-03 RX ADMIN — Medication 650 MILLIGRAM(S): at 12:15

## 2022-06-03 RX ADMIN — ATORVASTATIN CALCIUM 80 MILLIGRAM(S): 80 TABLET, FILM COATED ORAL at 21:24

## 2022-06-03 RX ADMIN — HEPARIN SODIUM 5000 UNIT(S): 5000 INJECTION INTRAVENOUS; SUBCUTANEOUS at 05:18

## 2022-06-03 NOTE — CONSULT NOTE ADULT - SUBJECTIVE AND OBJECTIVE BOX
Patient seen and evaluated at bedside    HPI:  89 y/o M with pmhx of CAD s/p CABG, stent recently in 5/2022, HTN, DM2, CKD, PVD with stent presented to the ED for new onset syncopal episodes. The patient was recently diagnosed with CHF exacerbation on the previous admission and was given a stent because of an abnormal stress test. The patient since then had been discharged. 2 days ago, the patient had been having syncopal episodes that last for a few seconds. The patient would be sitting on the chair and would suddenly pass out for a few seconds. He does not remember what occurred before the event. This happened 3 times on sunday. Yesterday the patient was sitting on the stairs outside. Then the son found him on the ground when he stepped away to get a pillow for the patient. No head trauma.    I spoke with the son and his wife is the PCP for the patient. Initially the wife thought it could be orthostatic hypotension but she said the patient denies lightheadedness before the event. She is concerned that due to the recent stent placement, was concerning for arrythmia. The patient denies chest pain or new onset shortness of breath. She also told me that the event usually occurs after he exerts himself and sits down. She also stopped his hydralazine yesterday because he was mildly hypotensive. They endorsed that they checked his BP at home and it was 92 systolic but when he was in the ED it was elevated. Denies hx of seizures.     PMHx:   Hyperlipemia    Hypertension    Coronary Artery Disease    Diabetes Mellitus Type II    Stented Coronary Artery    Neuropathy    Myocardial infarction    Stroke      PSHx:   History of Cataract Extraction    Hx of CABG    H/O coronary angiogram    S/P coronary artery stent placement      FAMILY HISTORY:  No pertinent family history in first degree relatives      Allergies:  carbamazepine (Other)  Cipro (Unknown)  fluoroquinolone antibiotics (Other)  Tegretol (Unknown)    Home Medications:  acetaminophen 325 mg oral tablet: 2 tab(s) orally every 6 hours, As needed, Mild Pain (1 - 3), Moderate Pain (4 - 6) (27 May 2022 10:11)  Basaglar KwikPen 100 units/mL subcutaneous solution: 20 unit(s) subcutaneous once a day (at bedtime) (22 May 2022 09:25)  Crestor 40 mg oral tablet: 1 tab(s) orally once a day (22 May 2022 09:25)  Dexilant 60 mg oral delayed release capsule: 1 cap(s) orally once a day (22 May 2022 09:25)  gabapentin 100 mg oral capsule: 4 cap(s) orally 2 times a day (22 May 2022 09:25)  Lexapro 10 mg oral tablet: 1 tab(s) orally once a day (22 May 2022 09:25)  Linzess 145 mcg oral capsule: 1 cap(s) orally once a day (22 May 2022 09:25)  Micardis 20 mg oral tablet: 1 tab(s) orally once a day (22 May 2022 09:25)  polyethylene glycol 3350 oral powder for reconstitution: 17 gram(s) orally once a day (22 May 2022 09:25)  Vitamin D3 25 mcg (1000 intl units) oral capsule: 1 cap(s) orally once a day (22 May 2022 09:25)    Current Medications:   acetaminophen     Tablet .. 650 milliGRAM(s) Oral every 6 hours PRN  aspirin enteric coated 81 milliGRAM(s) Oral daily  atorvastatin 80 milliGRAM(s) Oral at bedtime  BACItracin   Ointment 1 Application(s) Topical two times a day  cholecalciferol 1000 Unit(s) Oral daily  dextrose 5%. 1000 milliLiter(s) IV Continuous <Continuous>  dextrose 5%. 1000 milliLiter(s) IV Continuous <Continuous>  dextrose 50% Injectable 25 Gram(s) IV Push once  dextrose 50% Injectable 12.5 Gram(s) IV Push once  dextrose 50% Injectable 25 Gram(s) IV Push once  dextrose Oral Gel 15 Gram(s) Oral once PRN  escitalopram 10 milliGRAM(s) Oral daily  gabapentin 300 milliGRAM(s) Oral two times a day  glucagon  Injectable 1 milliGRAM(s) IntraMuscular once  heparin   Injectable 5000 Unit(s) SubCutaneous every 12 hours  insulin glargine Injectable (LANTUS) 20 Unit(s) SubCutaneous <User Schedule>  insulin lispro (ADMELOG) corrective regimen sliding scale   SubCutaneous three times a day before meals  insulin lispro (ADMELOG) corrective regimen sliding scale   SubCutaneous at bedtime  melatonin 3 milliGRAM(s) Oral at bedtime PRN  pantoprazole    Tablet 40 milliGRAM(s) Oral before breakfast  sodium chloride 0.9%. 1000 milliLiter(s) IV Continuous <Continuous>  sodium chloride 0.9%. 1000 milliLiter(s) IV Continuous <Continuous>  ticagrelor 90 milliGRAM(s) Oral every 12 hours    Social History  Smoking History: denies  Alcohol Use: denies  Drug Use: denies    REVIEW OF SYSTEMS:  Constitutional:     [X] negative [ ] fevers [ ] chills [ ] weight loss [ ] weight gain  HEENT:                  [X] negative [ ] dry eyes [ ] eye irritation [ ] postnasal drip [ ] nasal congestion  CV:                         [X] negative  [ ] chest pain [ ] orthopnea [ ] palpitations [ ] murmur  Resp:                     [X] negative [ ] cough [ ] shortness of breath [ ] wheezing [ ] sputum [ ] hemoptysis  GI:                          [X] negative [ ] nausea [ ] vomiting [ ] diarrhea [ ] constipation [ ] abd pain [ ] dysphagia   :                        [X] negative [ ] dysuria [ ] nocturia [ ] hematuria [ ] increased urinary frequency  MSK:                      [X] negative [ ] back pain [ ] myalgias [ ] arthralgias [ ] fracture  Skin:                       [X] negative [ ] rash [ ] itch  Neuro:                   [ ] negative [ ] headache [ ] dizziness [X] syncope [ ] weakness [ ] numbness  Psych:                    [X] negative [ ] anxiety [ ] depression  Endo:                     [X] negative [ ] diabetes [ ] thyroid problem  Heme/Lymph:      [X] negative [ ] anemia [ ] bleeding problem  Allergic/Immune: [X] negative [ ] itchy eyes [ ] nasal discharge [ ] hives [ ] angioedema    [X] All other systems negative or otherwise described above.  [ ] Unable to assess ROS because ________.    ICU Vital Signs Last 24 Hrs  T(C): 36.8 (03 Jun 2022 05:15), Max: 36.8 (02 Jun 2022 21:05)  T(F): 98.3 (03 Jun 2022 05:15), Max: 98.3 (02 Jun 2022 21:05)  HR: 75 (03 Jun 2022 05:15) (75 - 78)  BP: 150/66 (03 Jun 2022 05:15) (148/69 - 150/66)  BP(mean): --  ABP: --  ABP(mean): --  RR: 18 (03 Jun 2022 11:25) (18 - 18)  SpO2: 100% (03 Jun 2022 11:25) (100% - 100%)    Orthostatic VS    06-03-22 @ 11:25  Lying BP: 155/68 HR: 70   Sitting BP: 115/54 HR: 73  Standing BP: 95/49 HR: 81  Site: --   Mode: --    Daily     Daily   I&O's Summary      Physical Exam:  GENERAL: No acute distress, well-developed  HEAD:  Atraumatic, Normocephalic  ENT: EOMI, conjunctiva and sclera clear, Neck supple, No JVD, moist mucosa  CHEST/LUNG: Clear to auscultation bilaterally; No wheeze, equal breath sounds bilaterally   BACK: No spinal tenderness  HEART: Regular rate and rhythm; No murmurs, rubs, or gallops, radial and DP 2+ b/l, euvolemic  ABDOMEN: Soft, Nontender, Nondistended  EXTREMITIES:  No clubbing, cyanosis, or edema  PSYCH: Nl behavior, nl affect  NEUROLOGY: AAOx3, non-focal  SKIN: Normal color, No rashes or lesions  LINES: no central lines present    LABS:                        13.5   7.55  )-----------( 194      ( 03 Jun 2022 03:16 )             41.6       06-03    135  |  102  |  26<H>  ----------------------------<  91  4.1   |  21<L>  |  1.55<H>    Ca    9.1      03 Jun 2022 03:16  Phos  2.9     06-03  Mg     2.10     06-03          BNP: Serum Pro-Brain Natriuretic Peptide (05.31.22 @ 17:50)    Serum Pro-Brain Natriuretic Peptide: 858 pg/mL     Lipid Profile: Cholesterol 113 mg/dL, Triglyceride 46 mg/dL, LDL 53 mg/dL, HDL 51 mg/dL, [02-27-22]      HgA1c: A1C with Estimated Average Glucose (05.23.22 @ 06:22)    A1C with Estimated Average Glucose Result: 7.5%    Estimated Average Glucose: 169      TSH: Thyroid Stimulating Hormone, Serum: 2.41 uIU/mL (05-23-22 @ 06:22)          RADIOLOGY & ADDITIONAL STUDIES:    Cardiovascular Diagnostic Testing    ECG: Personally reviewed  NSR, 1st degree AVB, RBBB, LAFB    Telemetry: reviewed; NSR    Echo: Personally reviewed  < from: Transthoracic Echocardiogram (05.23.22 @ 08:21) >  Ejection Fraction (Florence Rule): 61 %  ------------------------------------------------------------------------  OBSERVATIONS:  Mitral Valve: Mitral annular calcification, otherwise  normal mitral valve. Mild-moderate mitral regurgitation.  Aortic Root: Normal aortic root.  Aortic Valve: Aortic valve leaflet morphology not well  visualized. Peak transaortic valve gradient equals 18 mm  Hg, mean transaortic valve gradient equals 10 mm Hg,  consistent with mild aortic stenosis. Minimal aortic  regurgitation.  Left Atrium: Mildly dilated left atrium.  LA volume index =  41 cc/m2.  Left Ventricle: Endocardium not well visualized; grossly  normal left ventricular systolic function. Normal left  ventricular internal dimensions and wall thicknesses. Mild  diastolic dysfunction (Stage I).  Right Heart: Normal right atrium. The right ventricle is  not well visualized; grossly normal right ventricular  systolic function. Normal tricuspid valve.  Minimal  tricuspid regurgitation. Normal pulmonic valve. Minimal  pulmonic regurgitation.  Pericardium/PleuraNormal pericardium withno pericardial  effusion.  ------------------------------------------------------------------------  CONCLUSIONS:  1. Mitral annular calcification, otherwise normal mitral  valve. Mild-moderate mitral regurgitation.  2. Aortic valve leaflet morphology not well visualized.  Peak transaortic valve gradient equals 18 mm Hg, mean  transaortic valve gradient equals 10 mm Hg, consistent with  mild aortic stenosis. Minimal aortic regurgitation.  3. Mildly dilated left atrium.  LA volume index = 41 cc/m2.  4. Normal left ventricular internal dimensions and wall  thicknesses.  5. Endocardium not well visualized; grossly normal left  ventricular systolic function.  6. Mild diastolic dysfunction (Stage I).  7. The right ventricle is not well visualized;grossly  normal right ventricular systolic function.  ------------------------------------------------------------------------  Confirmed on  5/23/2022 - 10:41:31 by Sai Blake M.D.,  [facility]  ------------------------------------------------------------------------    < end of copied text >    Stress Testing:   < from: Nuclear Stress Test-Pharmacologic (Nuclear Stress Test-Pharmacologic .) (05.24.22 @ 14:44) >  IMPRESSIONS:Abnormal Study  * Myocardial Perfusion SPECT results are abnormal.  * There are large, severe defects in inferior and  inferolateral walls that is predominantly reversible,  consistent with infarct with significant mateo-infarct  ischemia.There is a medium sized, moderate defect in  anterior wall that is reversible, consistent with  ischemia.  * Post-stress gated wall motion analysis was performed  (LVEF = 32 %;LVEDV = 90 ml.), revealing severe hypokinesis  in the inferior and inferolateral walls.  *** No previous Nuclear/Stress exam.  ------------------------------------------------------------------------  Confirmed on  5/25/2022 - 14:25:35 by Zenia Rubin MD  ------------------------------------------------------------------------    < end of copied text >      Cath:   < from: Cardiac Catheterization (05.26.22 @ 17:08) >  Conclusions:   Significant restenosis in proximal segment of SVG to OM1 (prior stent  and recent POBA for restenosis). Patent LIMA to mid  LAD. RCA  with collaterals from LAD septals.     Successful PCI with GEMMA of proximal SVG to OM1 lesion (in-stent  restenosis).    Recommendations:   Continue dual antiplatelet therapy for 6 months (aspirin and  ticagrelor).    Acute complication:    No complications     < end of copied text >      CXR: Personally reviewed  < from: Xray Chest 1 View- PORTABLE-Urgent (05.22.22 @ 01:56) >  IMPRESSION: No acute pulmonary disease.    < end of copied text >    Other cardiac imaging: none    Other misc imaging:   < from: CT Cervical Spine No Cont (05.31.22 @ 20:43) >  IMPRESSION:  BRAIN:  No acute intracranial hemorrhage, mass effect, or depressed calvarial   fracture.    CERVICAL SPINE:  No acute vertebral fracture or traumatic malalignment.  Cervical spondylosis as described above.    --- End of Report ---    < end of copied text >

## 2022-06-03 NOTE — PROGRESS NOTE ADULT - ASSESSMENT
87 y/o M with pmhx of CAD s/p CABG, stent recently in 5/2022, HTN, DM2, CKD, PVD with stent presented to the ED for new onset syncopal episodes. Admit for syncope work up needing telemetry.     Problem/Plan - 1:  ·  Problem: Syncope.   ·  Plan: Assessment:  - patient has multiple episodes of syncope at home  - unclear etiology: no signs of lightheadedness before the episodes which could suspect orthostatic hypotension. However the patient is on multiple BP medications  - no hx of seizures  - no chest pain, Troponins neg x1, EKG shows RBBB and 1st degree AV block which is similar to previous EKG  - Echo from 5/2022 - normal EF, mild aortic stenosis  - orthostatic positive.   - hold BP meds  - does not need a repeat echo as it was done 2 weeks ago  - cardiology consulted and recommending EP consult  .  - Neurology help appreciated. MRI pending.   - EEG normal .      Problem/Plan - 2:  ·  Problem: Fall.   ·  Plan: Assessment:  - patient fell on concrete while sitting on the stairs  - No head trauma, but has skin scrapes on foot and elbow    Plan:  - PT eval  - evaluate for etiology for fall as above.     Problem/Plan - 3:  ·  Problem: Benign essential HTN.   ·  Plan: - hold BP meds for now for syncope work up, will give one dose of norvasc for now for HTN.     Problem/Plan - 4:  ·  Problem: DM2 (diabetes mellitus, type 2).   ·  Plan: - 10U lantus QHs.     Problem/Plan - 5:  ·  Problem: CAD (coronary artery disease).   ·  Plan: - c/w DAPT.     Problem/Plan - 6:  ·  Problem: Stage 3 chronic kidney disease.   ·  Plan: - monitor for now.     Problem/Plan - 7:  ·  Problem: Prophylactic measure.   ·  Plan: - heparin subq for dvt ppx.    Dispo : DC planning pending above. D/W Son in room.

## 2022-06-03 NOTE — PROGRESS NOTE ADULT - ASSESSMENT
This is a 87yo Male with PMHx of CAD s/p CABG and recent PCI with GEMMA of proximal SVG to OM1 in-stent restenosis (5/26/22), T2DM, HTN, CKD, PVD with stent, who presented to ED for syncope.     1. Syncope  - Positive orthostatics s/p IVF bolus  - Telemetry with 1st degree AV block with episodes of sinus bradycardia   - EKG 1st degree AV block, bifascicular block  - Continue to hold AV myron blocking meds (on Carvedilol 6.25mg BID outpatient)  - Please consult EP to evaluate for conduction disease leading to syncope  - Monitor on Telemetry     2. CAD s/p CABG and recent PCI with GEMMA of proximal SVG to OM1 in-stent restenosis (5/26/22)  - EKG without ischemic changes, hsTrop negative x2, currently asymptomatic. Low suspicion for ACS at this time  - TTE 5/23/22 with nl LV function, mild-mod MR, mild AS, stage I diastolic dysfunction. Do not need to repeat.   - Continue Aspirin 81mg, Brilinta 90mg BID, high intensity statin       Adolfo Perales MD  Cardiology Fellow - PGY 4  Text or Call: [phone]  For all New Consults and Questions:  [email or web address]   Login: [email or web address] This is a 89yo Male with PMHx of CAD s/p CABG and recent PCI with GEMMA of proximal SVG to OM1 in-stent restenosis (5/26/22), T2DM, HTN, CKD, PVD with stent, who presented to ED for syncope.     1. Syncope  - Positive orthostatics s/p IVF bolus  - Telemetry with 1st degree AV block with episodes of sinus bradycardia   - EKG 1st degree AV block, bifascicular block  - Continue to hold AV myron blocking meds (on Carvedilol 6.25mg BID outpatient)  - EP consulted to evaluate for conduction disease leading to syncope  - Monitor on Telemetry     2. CAD s/p CABG and recent PCI with GEMMA of proximal SVG to OM1 in-stent restenosis (5/26/22)  - EKG without ischemic changes, hsTrop negative x2, currently asymptomatic. Low suspicion for ACS at this time  - TTE 5/23/22 with nl LV function, mild-mod MR, mild AS, stage I diastolic dysfunction. Do not need to repeat.   - Continue Aspirin 81mg, Brilinta 90mg BID, high intensity statin       Adolfo Perales MD  Cardiology Fellow - PGY 4  Text or Call: [phone]  For all New Consults and Questions:  [email or web address]   Login: [email or web address]

## 2022-06-03 NOTE — PROGRESS NOTE ADULT - SUBJECTIVE AND OBJECTIVE BOX
Patient seen and examined at bedside.    Overnight Events: No acute events    Tele sinus 60s    Review Of Systems: No chest pain, shortness of breath, or palpitations            Current Meds:  acetaminophen     Tablet .. 650 milliGRAM(s) Oral every 6 hours PRN  aspirin enteric coated 81 milliGRAM(s) Oral daily  atorvastatin 80 milliGRAM(s) Oral at bedtime  BACItracin   Ointment 1 Application(s) Topical two times a day  cholecalciferol 1000 Unit(s) Oral daily  dextrose 5%. 1000 milliLiter(s) IV Continuous <Continuous>  dextrose 5%. 1000 milliLiter(s) IV Continuous <Continuous>  dextrose 50% Injectable 25 Gram(s) IV Push once  dextrose 50% Injectable 12.5 Gram(s) IV Push once  dextrose 50% Injectable 25 Gram(s) IV Push once  dextrose Oral Gel 15 Gram(s) Oral once PRN  escitalopram 10 milliGRAM(s) Oral daily  gabapentin 300 milliGRAM(s) Oral two times a day  glucagon  Injectable 1 milliGRAM(s) IntraMuscular once  heparin   Injectable 5000 Unit(s) SubCutaneous every 12 hours  insulin glargine Injectable (LANTUS) 20 Unit(s) SubCutaneous <User Schedule>  insulin lispro (ADMELOG) corrective regimen sliding scale   SubCutaneous three times a day before meals  insulin lispro (ADMELOG) corrective regimen sliding scale   SubCutaneous at bedtime  melatonin 3 milliGRAM(s) Oral at bedtime PRN  pantoprazole    Tablet 40 milliGRAM(s) Oral before breakfast  sodium chloride 0.9%. 1000 milliLiter(s) IV Continuous <Continuous>  ticagrelor 90 milliGRAM(s) Oral every 12 hours      Vitals:  T(F): 98.3 (06-03), Max: 98.3 (06-02)  HR: 75 (06-03) (66 - 78)  BP: 150/66 (06-03) (148/69 - 163/57)  RR: 18 (06-03)  SpO2: 100% (06-03)  I&O's Summary      Physical Exam:  GENERAL: No acute distress, well-developed  HEAD:  Atraumatic, Normocephalic  ENT: EOMI, conjunctiva and sclera clear, Neck supple, No JVD, moist mucosa  CHEST/LUNG: Clear to auscultation bilaterally; No wheeze, equal breath sounds bilaterally   HEART: Regular rate and rhythm; No murmurs, rubs, or gallops  ABDOMEN: Soft, Nontender, Nondistended; Bowel sounds present  EXTREMITIES:  No edema  PSYCH: Nl behavior, nl affect  NEUROLOGY: AAOx3, non-focal, cranial nerves intact                          13.5   7.55  )-----------( 194      ( 03 Jun 2022 03:16 )             41.6     06-03    135  |  102  |  26<H>  ----------------------------<  91  4.1   |  21<L>  |  1.55<H>    Ca    9.1      03 Jun 2022 03:16  Phos  2.9     06-03  Mg     2.10     06-03        CARDIAC MARKERS ( 01 Jun 2022 06:12 )  47 ng/L / x     / x     / x     / x     / x      CARDIAC MARKERS ( 31 May 2022 17:50 )  47 ng/L / x     / x     / x     / x     / x          Serum Pro-Brain Natriuretic Peptide: 858 pg/mL (05-31 @ 17:50)

## 2022-06-03 NOTE — PROGRESS NOTE ADULT - ATTENDING COMMENTS
The patient was seen and examined with the Cardiology Consultation Teaching Service.     No overnight events    No chest pain  No dyspnea, orthopnea or PND  No palpitations or dizziness     Comfortable-appearing man in no acute distress  Alert and oriented  Afebrile  Vital signs stable  JVP is not elevated  Clear lungs  Normal heart sounds  Extremities are warm and perfused  No peripheral edema     Normal blood counts  Stable GFR 43    hs-troponin normal  pro-    Telemetry without evidence of significant arrhythmia, no evidence of heart block    Echocardiography demonstrates grossly normal LV function, mild diastolic dysfunction, mildly dilated LA, mild-moderate MR, mild AS    Impression and Recommendations:   88-year-old man with coronary artery disease, CABG, and recent PCI to [place] [address] one week prior to admission who presents after recurrent episodes of syncope.     Patient was found to have orthostasis, but clinical description of syncope is concerning for possible arrhythmia, particularly given the patient's baseline conduction disease seen on ECG. Please monitor the patient on telemetry. Will discuss with EP the utility of EP study or long-term monitoring with ILR in the work-up of these episodes.    I agree that although the patient recently underwent PCI, there is no evidence of acute coronary syndrome or acute stent thrombosis as an etiology for this patient's presentation. Likewise, there is no evidence of decompensated heart failure on examination.    Continue aspirin, ticagrelor and atorvastatin for known coronary disease and recent PCI.   Continue to hold beta-blocker given concern for possible heart block or bradyarrhythmias.     Can re-check orthostatics, but would consider reinitiating hydralazine as the patient is significantly hypertensive.     Carlo Albarran MD  Cardiology  [phone]

## 2022-06-03 NOTE — PROGRESS NOTE ADULT - SUBJECTIVE AND OBJECTIVE BOX
Date of Service  : 06-03-22     INTERVAL HPI/OVERNIGHT EVENTS: Had dizzy episode.   Vital Signs Last 24 Hrs  T(C): 36.8 (03 Jun 2022 05:15), Max: 36.8 (02 Jun 2022 21:05)  T(F): 98.3 (03 Jun 2022 05:15), Max: 98.3 (02 Jun 2022 21:05)  HR: 75 (03 Jun 2022 05:15) (75 - 78)  BP: 150/66 (03 Jun 2022 05:15) (148/69 - 150/66)  BP(mean): --  RR: 18 (03 Jun 2022 11:25) (18 - 18)  SpO2: 100% (03 Jun 2022 11:25) (100% - 100%)  I&O's Summary    MEDICATIONS  (STANDING):  aspirin enteric coated 81 milliGRAM(s) Oral daily  atorvastatin 80 milliGRAM(s) Oral at bedtime  BACItracin   Ointment 1 Application(s) Topical two times a day  cholecalciferol 1000 Unit(s) Oral daily  dextrose 5%. 1000 milliLiter(s) (50 mL/Hr) IV Continuous <Continuous>  dextrose 5%. 1000 milliLiter(s) (100 mL/Hr) IV Continuous <Continuous>  dextrose 50% Injectable 25 Gram(s) IV Push once  dextrose 50% Injectable 12.5 Gram(s) IV Push once  dextrose 50% Injectable 25 Gram(s) IV Push once  escitalopram 10 milliGRAM(s) Oral daily  gabapentin 300 milliGRAM(s) Oral two times a day  glucagon  Injectable 1 milliGRAM(s) IntraMuscular once  heparin   Injectable 5000 Unit(s) SubCutaneous every 12 hours  insulin glargine Injectable (LANTUS) 20 Unit(s) SubCutaneous <User Schedule>  insulin lispro (ADMELOG) corrective regimen sliding scale   SubCutaneous three times a day before meals  insulin lispro (ADMELOG) corrective regimen sliding scale   SubCutaneous at bedtime  pantoprazole    Tablet 40 milliGRAM(s) Oral before breakfast  sodium chloride 0.9%. 1000 milliLiter(s) (75 mL/Hr) IV Continuous <Continuous>  ticagrelor 90 milliGRAM(s) Oral every 12 hours    MEDICATIONS  (PRN):  acetaminophen     Tablet .. 650 milliGRAM(s) Oral every 6 hours PRN Temp greater or equal to 38C (100.4F), Mild Pain (1 - 3)  dextrose Oral Gel 15 Gram(s) Oral once PRN Blood Glucose LESS THAN 70 milliGRAM(s)/deciliter  melatonin 3 milliGRAM(s) Oral at bedtime PRN Insomnia    LABS:                        13.5   7.55  )-----------( 194      ( 03 Jun 2022 03:16 )             41.6     06-03    135  |  102  |  26<H>  ----------------------------<  91  4.1   |  21<L>  |  1.55<H>    Ca    9.1      03 Jun 2022 03:16  Phos  2.9     06-03  Mg     2.10     06-03          CAPILLARY BLOOD GLUCOSE      POCT Blood Glucose.: 226 mg/dL (03 Jun 2022 12:35)  POCT Blood Glucose.: 141 mg/dL (03 Jun 2022 08:54)  POCT Blood Glucose.: 181 mg/dL (02 Jun 2022 22:24)  POCT Blood Glucose.: 115 mg/dL (02 Jun 2022 17:15)          REVIEW OF SYSTEMS:  CONSTITUTIONAL: No fever, weight loss, or fatigue  EYES: No eye pain, visual disturbances, or discharge  ENMT:  No difficulty hearing, tinnitus, vertigo; No sinus or throat pain  NECK: No pain or stiffness  RESPIRATORY: No cough, wheezing, chills or hemoptysis; No shortness of breath  CARDIOVASCULAR: No chest pain, palpitations, dizziness, or leg swelling  GASTROINTESTINAL: No abdominal or epigastric pain. No nausea, vomiting, or hematemesis; No diarrhea or constipation. No melena or hematochezia.  GENITOURINARY: No dysuria, frequency, hematuria, or incontinence  NEUROLOGICAL: No headaches, memory loss, loss of strength, numbness, or tremors      Consultant(s) Notes Reviewed:  [x ] YES  [ ] NO    PHYSICAL EXAM:  GENERAL: NAD, well-groomed, well-developed, not in any distress ,  HEAD:  Atraumatic, Normocephalic  NECK: Supple, No JVD, Normal thyroid  NERVOUS SYSTEM:  Alert & Oriented X3, No focal deficit   CHEST/LUNG: Good air entry bilateral with no  rales, rhonchi, wheezing, or rubs  HEART: Regular rate and rhythm; No murmurs, rubs, or gallops  ABDOMEN: Soft, Nontender, Nondistended; Bowel sounds present  EXTREMITIES:  2+ Peripheral Pulses, No clubbing, cyanosis, or edema    Care Discussed with Consultants/Other Providers [ x] YES  [ ] NO

## 2022-06-03 NOTE — CONSULT NOTE ADULT - ASSESSMENT
87 y/o M with pmhx of CAD s/p CABG, stent recently in 5/2022, HTN, DM2, CKD, PVD with stent presented to the ED for new onset syncopal episodes.    Plan  -orthostatics positive however overall presentation concerning for cardiac syncope in the setting of apparent conduction disease as evidenced by ECG  -c/w telemetry monitoring  -daily orthostatics  -hold AVN blockers  -c/w DAPT in the setting of recent PCI  -recommend PPM implantation; patient agreeable to this procedure (which could be performed Monday 6/6, however he desires to go home today and then return. From an electrophysiology perspective, we recommend he remain admitted due to his concerning presentation. Patient states that he will discuss with his son today and make a decision. Please notify EP if patient decides to remain hospitalized so that consent may be obtained.

## 2022-06-03 NOTE — CONSULT NOTE ADULT - SUBJECTIVE AND OBJECTIVE BOX
HPI: Mr. Foss is an 88 year-old man with history of multiple medical issues including hypertension, type 2 diabetes mellitus, coronary artery disease s/p CABG, and congestive heart failure with preserved EF. He is s/p recent admission for decompensated CHF. He presented 22 to the [facility] ER s/p syncopal episode x3. BP was checked at home prior to presentation; systolic BP was 92mmHg. He was found to be orthostatic in the ER; NS 500cc was given.    I see that he was on Micardis, Coreg, Isosorbide, Lasix, and Jardiance prior to admission. All of these meds are on hold at present.    PAST MEDICAL & SURGICAL HISTORY:  Hyperlipemia  Hypertension  DM2  Coronary Artery Disease - 5 stents/CABG  HFpEF  Neuropathy  CVA  PAD  Cataract Extraction    Allergies  carbamazepine (Other)  fluoroquinolone antibiotics (Other)    SOCIAL HISTORY:  Denies ETOh,Smoking,     FAMILY HISTORY:  No pertinent family history in first degree relatives    REVIEW OF SYSTEMS:  CONSTITUTIONAL: No weakness, fevers or chills  EYES/ENT: No visual changes;  No vertigo or throat pain   NECK: No pain or stiffness  RESPIRATORY: No cough, wheezing, hemoptysis; No shortness of breath  CARDIOVASCULAR: No chest pain or palpitations; (+)syncope  GASTROINTESTINAL: No abdominal or epigastric pain. No nausea, vomiting, or hematemesis; No diarrhea or constipation. No melena or hematochezia.  GENITOURINARY: No dysuria, frequency or hematuria  NEUROLOGICAL: No numbness or weakness  SKIN: No itching, burning, rashes, or lesions   All other review of systems is negative unless indicated above.    VITAL:  T(C): , Max: 36.8 (22 @ 21:05)  T(F): , Max: 98.3 (22 @ 21:05)  HR: 75 (22 @ 05:15)  BP: 150/66 (22 @ 05:15)  RR: 18 (22 @ 11:25)  SpO2: 100% (22 @ 11:25)    (6/3/22) - lyin/68, 70; standin/49, 81      PHYSICAL EXAM:  Constitutional: NAD, Alert  HEENT: NCAT, MMM  Neck: Supple, No JVD  Respiratory: CTA-b/l  Cardiovascular: RRR s1s2, no m/r/g  Gastrointestinal: BS+, soft, NT/ND  Extremities: No peripheral edema b/l  Neurological: no focal deficits; strength grossly intact  Back: no CVAT b/l  Skin: No rashes, no nevi    LABS:                        13.5   7.55  )-----------( 194      ( 2022 03:16 )             41.6     Na(135)/K(4.1)/Cl(102)/HCO3(21)/BUN(26)/Cr(1.55)Glu(91)/Ca(9.1)/Mg(2.10)/PO4(2.9)     @ 03:16  Na(137)/K(4.0)/Cl(104)/HCO3(22)/BUN(30)/Cr(1.62)Glu(99)/Ca(8.8)/Mg(2.30)/PO4(3.2)     @ 06:30  Na(139)/K(4.2)/Cl(103)/HCO3(24)/BUN(38)/Cr(1.82)Glu(196)/Ca(9.4)/Mg(2.40)/PO4(4.0)     @ 06:12  Na(138)/K(4.9)/Cl(103)/HCO3(24)/BUN(42)/Cr(2.11)Glu(220)/Ca(9.3)/Mg(--)/PO4(--)     @ 17:50    (22) - BUN/creatinine: 40/2.15  (3/6/22) - BUN/creatinine: 37/1.72  (3/2/22) - UA - no protein, no blood    IMAGING:  < from: CT Cervical Spine No Cont (22 @ 20:43) >  No acute intracranial hemorrhage, mass effect, or depressed calvarial fracture.  No acute vertebral fracture or traumatic malalignment.  Cervical spondylosis as described above.    < from: Xray Chest 1 View- PORTABLE-Urgent (Xray Chest 1 View- PORTABLE-Urgent .) (22 @ 18:59) >  Clear lungs.    < from: US Kidney and Bladder (22 @ 11:23) >  Right kidney: 9.6 cm. No renal mass, hydronephrosis or calculi.  Left kidney: 9.0 cm. No hydronephrosis or calculi. A 1.4 cm hypoechoic focus in the upper pole with no internal vascularity, new. This can be further evaluated with MRI.  Urinary bladder: Within normal limits.    < from: Transthoracic Echocardiogram (22 @ 08:21) >  1. Mitral annular calcification, otherwise normal mitral  valve. Mild-moderate mitral regurgitation.  2. Aortic valve leaflet morphology not well visualized.  Peak transaortic valve gradient equals 18 mm Hg, mean  transaortic valve gradient equals 10 mm Hg, consistent with  mild aortic stenosis. Minimal aortic regurgitation.  3. Mildly dilated left atrium.  LA volume index = 41 cc/m2.  4. Normal left ventricular internal dimensions and wall  thicknesses.  5. Endocardium not well visualized; grossly normal left  ventricular systolic function.  6. Mild diastolic dysfunction (Stage I).  7. The right ventricle is not well visualized;grossly  normal right ventricular systolic function.        ASSESSMENT:  (1)Renal - nonproteinuric CKD 3b; resolving mild prerenally-mediated MABLE  (2)CV - orthostatic hypotension/associated syncope on admission. He received 500cc on admission. Is he still dry? Surprising otherwise that he would still be orthostatic, s/p d/c of all of his antihypertensive meds.    RECOMMEND:  (1)NS 100cc/h x 10h  (2)Urine: lytes, creatinine  (3)Hold antihypertensives/diuretics/SGLT2 for now  (4)Orthostatics qshift  (5)Dose new meds for GFR 30-40ml/min    Thank you for involving [facility] in this patient's care.    With warm regards,    Davey Chacko MD   [facility]  Office: [phone]  Cell: [phone]               HPI: Mr. Foss is an 88 year-old man with history of multiple medical issues including hypertension, type 2 diabetes mellitus, coronary artery disease s/p CABG, and congestive heart failure with preserved EF. He is s/p recent admission for decompensated CHF. He presented 22 to the [facility] ER s/p fall x3. BP was checked at home prior to presentation; systolic BP was 92mmHg. He was found to be orthostatic in the ER; NS 500cc was given.    I see that he was on Micardis, Coreg, Isosorbide, Lasix, and Jardiance prior to admission. All of these meds are on hold at present.    Mr. Foss denies known history of syncope from prior to this week. He denies chest pain, palpitations, or shortness of breath. He denies dizziness and suggests that he did not "pass out", but rather that he simply fell. Denies history of renal impairment and denies urinary changes. Insists that he is ready to go home.    PAST MEDICAL & SURGICAL HISTORY:  Hyperlipemia  Hypertension  DM2  Coronary Artery Disease - 5 stents/CABG  HFpEF  Neuropathy  CVA  PAD  Cataract Extraction    Allergies  carbamazepine (Other)  fluoroquinolone antibiotics (Other)    SOCIAL HISTORY:  Denies ETOh,Smoking,     FAMILY HISTORY:  No pertinent family history in first degree relatives    REVIEW OF SYSTEMS:  CONSTITUTIONAL: No weakness, fevers or chills  EYES/ENT: No visual changes;  No vertigo or throat pain   NECK: No pain or stiffness  RESPIRATORY: No cough, wheezing, hemoptysis; No shortness of breath  CARDIOVASCULAR: No chest pain or palpitations; (+)syncope  GASTROINTESTINAL: No abdominal or epigastric pain. No nausea, vomiting, or hematemesis; No diarrhea or constipation. No melena or hematochezia.  GENITOURINARY: No dysuria, frequency or hematuria  NEUROLOGICAL: No numbness or weakness  SKIN: No itching, burning, rashes, or lesions   All other review of systems is negative unless indicated above.    VITAL:  T(C): , Max: 36.8 (22 @ 21:05)  T(F): , Max: 98.3 (22 @ 21:05)  HR: 75 (22 @ 05:15)  BP: 150/66 (22 @ 05:15)  RR: 18 (22 @ 11:25)  SpO2: 100% (22 @ 11:25)    (6/3/22) - lyin/68, 70; standin/49, 81      PHYSICAL EXAM:  Constitutional: Frail; thin to cachectic  HEENT: DMM  Neck: Supple, No JVD  Respiratory: CTA-b/l  Cardiovascular: distant s1s2, (+)2/6 TODD  Gastrointestinal: BS+, soft, NT/ND  Extremities: No peripheral edema b/l  Neurological: no focal deficits; strength grossly intact  Back: no CVAT b/l  Skin: No rashes, no nevi    LABS:                        13.5   7.55  )-----------( 194      ( 2022 03:16 )             41.6     Na(135)/K(4.1)/Cl(102)/HCO3(21)/BUN(26)/Cr(1.55)Glu(91)/Ca(9.1)/Mg(2.10)/PO4(2.9)     @ 03:16  Na(137)/K(4.0)/Cl(104)/HCO3(22)/BUN(30)/Cr(1.62)Glu(99)/Ca(8.8)/Mg(2.30)/PO4(3.2)     @ 06:30  Na(139)/K(4.2)/Cl(103)/HCO3(24)/BUN(38)/Cr(1.82)Glu(196)/Ca(9.4)/Mg(2.40)/PO4(4.0)     @ 06:12  Na(138)/K(4.9)/Cl(103)/HCO3(24)/BUN(42)/Cr(2.11)Glu(220)/Ca(9.3)/Mg(--)/PO4(--)     @ 17:50    (22) - BUN/creatinine: 40/2.15  (3/6/22) - BUN/creatinine: 37/1.72  (3/2/22) - UA - no protein, no blood    IMAGING:  < from: CT Cervical Spine No Cont (22 @ 20:43) >  No acute intracranial hemorrhage, mass effect, or depressed calvarial fracture.  No acute vertebral fracture or traumatic malalignment.  Cervical spondylosis as described above.    < from: Xray Chest 1 View- PORTABLE-Urgent (Xray Chest 1 View- PORTABLE-Urgent .) (22 @ 18:59) >  Clear lungs.    < from: US Kidney and Bladder (22 @ 11:23) >  Right kidney: 9.6 cm. No renal mass, hydronephrosis or calculi.  Left kidney: 9.0 cm. No hydronephrosis or calculi. A 1.4 cm hypoechoic focus in the upper pole with no internal vascularity, new. This can be further evaluated with MRI.  Urinary bladder: Within normal limits.    < from: Transthoracic Echocardiogram (22 @ 08:21) >  1. Mitral annular calcification, otherwise normal mitral  valve. Mild-moderate mitral regurgitation.  2. Aortic valve leaflet morphology not well visualized.  Peak transaortic valve gradient equals 18 mm Hg, mean  transaortic valve gradient equals 10 mm Hg, consistent with  mild aortic stenosis. Minimal aortic regurgitation.  3. Mildly dilated left atrium.  LA volume index = 41 cc/m2.  4. Normal left ventricular internal dimensions and wall  thicknesses.  5. Endocardium not well visualized; grossly normal left  ventricular systolic function.  6. Mild diastolic dysfunction (Stage I).  7. The right ventricle is not well visualized;grossly  normal right ventricular systolic function.        ASSESSMENT:  (1)Renal - nonproteinuric CKD 3b; resolving mild prerenally-mediated MABLE  (2)CV - orthostatic hypotension/associated syncope on admission. He received 500cc on admission. Is he still dry? Surprising otherwise that he would still be orthostatic, s/p d/c of all of his antihypertensive meds. Distant heart sounds and significant murmur on exam; has anything changed since ?    RECOMMEND:  (1)NS 100cc/h x 10h  (2)Urine: lytes, creatinine  (3)Hold antihypertensives/diuretics/SGLT2 for now  (4)Repeat TTE  (5)Orthostatics qshift  (6)Dose new meds for GFR 30-40ml/min    Thank you for involving [facility] in this patient's care.    With warm regards,    Davey Chacko MD   [facility]  Office: [phone]  Cell: [phone]

## 2022-06-03 NOTE — CONSULT NOTE ADULT - ATTENDING COMMENTS
89 y/o M with pmhx of CAD s/p CABG, stent recently in 5/2022, HTN, DM2, CKD, PVD with stent presented to the ED for after a syncopal episode. Given EKG findings, would benefit from PPM placement on Monday.
The patient was seen and examined with the Cardiology Consultation Teaching Service.     He is an 88-year-old man with coronary artery disease, CABG, and recent PCI to [place] [address] one week prior to admission who presents after recurrent episodes of syncope.     Multiple brief episodes of loss of consciousness are described while the patient was sitting, although he did not report these to us. He reported to us that he was walking outside, when he must have suddenly lost consciousness, and remembers his son helping him. No prodrome. Upon waking, the patient reportedly was not confused. He did not experience nausea, vomiting or diaphoresis.    No chest pain  No dyspnea, orthopnea or PND  No palpitations    Comfortable-appearing man in no acute distress  Alert and oriented  Afebrile  Vital signs stable  Found to have orthostasis  JVP is not elevated  Clear lungs  Normal heart sounds  Soft, non-tender, non-distended abdomen  Extremities are warm and perfused  No peripheral edema     Borderline normocytic anemia, Hb 12.8  Mild azotemia 30  GFR 41    hs-troponin normal  pro-    ECG demonstrates first degree AV delay, LAFB, RBBB  Telemetry with intermittent bradycardia, no definite evidence of high-degree AV block    Echocardiography demonstrates grossly normal LV function, mild diastolic dysfunction, mildly dilated LA, mild-moderate MR, mild AS    Impression and Recommendations:   88-year-old man with coronary artery disease, CABG, and recent PCI to [place] [address] one week prior to admission who presents after recurrent episodes of syncope.     Patient was found to have orthostasis, but clinical description of syncope is concerning for possible arrhythmia, particularly given the patient's baseline conduction disease seen on ECG. Please monitor the patient on telemetry. Will discuss with EP the utility of EP study or long-term monitoring with ILR in the work-up of these episodes.    I agree that although the patient recently underwent PCI, there is no evidence of acute coronary syndrome or acute stent thrombosis as an etiology for this patient's presentation. Likewise, there is no evidence of decompensated heart failure on examination.    Continue aspirin, ticagrelor and atorvastatin for known coronary disease and recent PCI.   Continue to hold antihypertensive agents given orthostasis.   Continue to hold beta-blocker given concern for possible heart block or bradyarrhythmias.     Carlo Albarran MD  Cardiology  [phone]

## 2022-06-04 LAB
ANION GAP SERPL CALC-SCNC: 10 MMOL/L — SIGNIFICANT CHANGE UP (ref 7–14)
BASOPHILS # BLD AUTO: 0.05 K/UL — SIGNIFICANT CHANGE UP (ref 0–0.2)
BASOPHILS NFR BLD AUTO: 0.7 % — SIGNIFICANT CHANGE UP (ref 0–2)
BUN SERPL-MCNC: 24 MG/DL — HIGH (ref 7–23)
CALCIUM SERPL-MCNC: 8.6 MG/DL — SIGNIFICANT CHANGE UP (ref 8.4–10.5)
CHLORIDE SERPL-SCNC: 107 MMOL/L — SIGNIFICANT CHANGE UP (ref 98–107)
CO2 SERPL-SCNC: 20 MMOL/L — LOW (ref 22–31)
CORTIS AM PEAK SERPL-MCNC: 8.2 UG/DL — SIGNIFICANT CHANGE UP (ref 6–18.4)
CREAT SERPL-MCNC: 1.54 MG/DL — HIGH (ref 0.5–1.3)
EGFR: 43 ML/MIN/1.73M2 — LOW
EOSINOPHIL # BLD AUTO: 0.29 K/UL — SIGNIFICANT CHANGE UP (ref 0–0.5)
EOSINOPHIL NFR BLD AUTO: 3.9 % — SIGNIFICANT CHANGE UP (ref 0–6)
GLUCOSE BLDC GLUCOMTR-MCNC: 131 MG/DL — HIGH (ref 70–99)
GLUCOSE BLDC GLUCOMTR-MCNC: 217 MG/DL — HIGH (ref 70–99)
GLUCOSE BLDC GLUCOMTR-MCNC: 241 MG/DL — HIGH (ref 70–99)
GLUCOSE BLDC GLUCOMTR-MCNC: 93 MG/DL — SIGNIFICANT CHANGE UP (ref 70–99)
GLUCOSE SERPL-MCNC: 145 MG/DL — HIGH (ref 70–99)
HCT VFR BLD CALC: 39.5 % — SIGNIFICANT CHANGE UP (ref 39–50)
HGB BLD-MCNC: 12.5 G/DL — LOW (ref 13–17)
IANC: 4.45 K/UL — SIGNIFICANT CHANGE UP (ref 1.8–7.4)
IMM GRANULOCYTES NFR BLD AUTO: 0.3 % — SIGNIFICANT CHANGE UP (ref 0–1.5)
LYMPHOCYTES # BLD AUTO: 1.96 K/UL — SIGNIFICANT CHANGE UP (ref 1–3.3)
LYMPHOCYTES # BLD AUTO: 26.1 % — SIGNIFICANT CHANGE UP (ref 13–44)
MAGNESIUM SERPL-MCNC: 2.1 MG/DL — SIGNIFICANT CHANGE UP (ref 1.6–2.6)
MCHC RBC-ENTMCNC: 29.3 PG — SIGNIFICANT CHANGE UP (ref 27–34)
MCHC RBC-ENTMCNC: 31.6 GM/DL — LOW (ref 32–36)
MCV RBC AUTO: 92.5 FL — SIGNIFICANT CHANGE UP (ref 80–100)
MONOCYTES # BLD AUTO: 0.74 K/UL — SIGNIFICANT CHANGE UP (ref 0–0.9)
MONOCYTES NFR BLD AUTO: 9.9 % — SIGNIFICANT CHANGE UP (ref 2–14)
NEUTROPHILS # BLD AUTO: 4.45 K/UL — SIGNIFICANT CHANGE UP (ref 1.8–7.4)
NEUTROPHILS NFR BLD AUTO: 59.1 % — SIGNIFICANT CHANGE UP (ref 43–77)
NRBC # BLD: 0 /100 WBCS — SIGNIFICANT CHANGE UP
NRBC # FLD: 0 K/UL — SIGNIFICANT CHANGE UP
PHOSPHATE SERPL-MCNC: 2.2 MG/DL — LOW (ref 2.5–4.5)
PLATELET # BLD AUTO: 205 K/UL — SIGNIFICANT CHANGE UP (ref 150–400)
POTASSIUM SERPL-MCNC: 4.2 MMOL/L — SIGNIFICANT CHANGE UP (ref 3.5–5.3)
POTASSIUM SERPL-SCNC: 4.2 MMOL/L — SIGNIFICANT CHANGE UP (ref 3.5–5.3)
RBC # BLD: 4.27 M/UL — SIGNIFICANT CHANGE UP (ref 4.2–5.8)
RBC # FLD: 13.1 % — SIGNIFICANT CHANGE UP (ref 10.3–14.5)
SARS-COV-2 RNA SPEC QL NAA+PROBE: SIGNIFICANT CHANGE UP
SODIUM SERPL-SCNC: 137 MMOL/L — SIGNIFICANT CHANGE UP (ref 135–145)
WBC # BLD: 7.51 K/UL — SIGNIFICANT CHANGE UP (ref 3.8–10.5)
WBC # FLD AUTO: 7.51 K/UL — SIGNIFICANT CHANGE UP (ref 3.8–10.5)

## 2022-06-04 RX ORDER — SODIUM,POTASSIUM PHOSPHATES 278-250MG
1 POWDER IN PACKET (EA) ORAL ONCE
Refills: 0 | Status: COMPLETED | OUTPATIENT
Start: 2022-06-04 | End: 2022-06-04

## 2022-06-04 RX ADMIN — Medication 650 MILLIGRAM(S): at 21:40

## 2022-06-04 RX ADMIN — Medication 650 MILLIGRAM(S): at 22:54

## 2022-06-04 RX ADMIN — Medication 1 APPLICATION(S): at 18:00

## 2022-06-04 RX ADMIN — INSULIN GLARGINE 20 UNIT(S): 100 INJECTION, SOLUTION SUBCUTANEOUS at 12:54

## 2022-06-04 RX ADMIN — Medication 2: at 17:57

## 2022-06-04 RX ADMIN — Medication 1000 UNIT(S): at 09:27

## 2022-06-04 RX ADMIN — TICAGRELOR 90 MILLIGRAM(S): 90 TABLET ORAL at 05:19

## 2022-06-04 RX ADMIN — HEPARIN SODIUM 5000 UNIT(S): 5000 INJECTION INTRAVENOUS; SUBCUTANEOUS at 05:18

## 2022-06-04 RX ADMIN — GABAPENTIN 300 MILLIGRAM(S): 400 CAPSULE ORAL at 05:18

## 2022-06-04 RX ADMIN — Medication 81 MILLIGRAM(S): at 09:27

## 2022-06-04 RX ADMIN — Medication 650 MILLIGRAM(S): at 05:30

## 2022-06-04 RX ADMIN — Medication 2: at 12:54

## 2022-06-04 RX ADMIN — HEPARIN SODIUM 5000 UNIT(S): 5000 INJECTION INTRAVENOUS; SUBCUTANEOUS at 18:01

## 2022-06-04 RX ADMIN — Medication 1 TABLET(S): at 09:27

## 2022-06-04 RX ADMIN — ESCITALOPRAM OXALATE 10 MILLIGRAM(S): 10 TABLET, FILM COATED ORAL at 09:27

## 2022-06-04 RX ADMIN — PANTOPRAZOLE SODIUM 40 MILLIGRAM(S): 20 TABLET, DELAYED RELEASE ORAL at 05:19

## 2022-06-04 RX ADMIN — ATORVASTATIN CALCIUM 80 MILLIGRAM(S): 80 TABLET, FILM COATED ORAL at 21:40

## 2022-06-04 RX ADMIN — Medication 1 APPLICATION(S): at 05:18

## 2022-06-04 RX ADMIN — GABAPENTIN 300 MILLIGRAM(S): 400 CAPSULE ORAL at 17:57

## 2022-06-04 RX ADMIN — TICAGRELOR 90 MILLIGRAM(S): 90 TABLET ORAL at 17:57

## 2022-06-04 RX ADMIN — Medication 650 MILLIGRAM(S): at 06:30

## 2022-06-04 NOTE — PROGRESS NOTE ADULT - SUBJECTIVE AND OBJECTIVE BOX
NEPHROLOGY     Patient seen and examined.    MEDICATIONS  (STANDING):  aspirin enteric coated 81 milliGRAM(s) Oral daily  atorvastatin 80 milliGRAM(s) Oral at bedtime  BACItracin   Ointment 1 Application(s) Topical two times a day  cholecalciferol 1000 Unit(s) Oral daily  dextrose 5%. 1000 milliLiter(s) (50 mL/Hr) IV Continuous <Continuous>  dextrose 5%. 1000 milliLiter(s) (100 mL/Hr) IV Continuous <Continuous>  dextrose 50% Injectable 25 Gram(s) IV Push once  dextrose 50% Injectable 12.5 Gram(s) IV Push once  dextrose 50% Injectable 25 Gram(s) IV Push once  escitalopram 10 milliGRAM(s) Oral daily  gabapentin 300 milliGRAM(s) Oral two times a day  glucagon  Injectable 1 milliGRAM(s) IntraMuscular once  heparin   Injectable 5000 Unit(s) SubCutaneous every 12 hours  insulin glargine Injectable (LANTUS) 20 Unit(s) SubCutaneous <User Schedule>  insulin lispro (ADMELOG) corrective regimen sliding scale   SubCutaneous three times a day before meals  insulin lispro (ADMELOG) corrective regimen sliding scale   SubCutaneous at bedtime  pantoprazole    Tablet 40 milliGRAM(s) Oral before breakfast  sodium chloride 0.9%. 1000 milliLiter(s) (75 mL/Hr) IV Continuous <Continuous>  sodium chloride 0.9%. 1000 milliLiter(s) (100 mL/Hr) IV Continuous <Continuous>  ticagrelor 90 milliGRAM(s) Oral every 12 hours    VITALS:  T(C): , Max: 36.8 (06-03-22 @ 21:20)  T(F): , Max: 98.3 (06-03-22 @ 21:20)  HR: 79 (06-04-22 @ 12:50)  BP: 147/64 (06-04-22 @ 12:50)  RR: 18 (06-04-22 @ 12:50)  SpO2: 100% (06-04-22 @ 12:50)    PHYSICAL EXAM:  Constitutional: Frail; thin to cachectic  HEENT: DMM  Neck: Supple, No JVD  Respiratory: CTA-b/l  Cardiovascular: distant s1s2, (+)2/6 TODD  Gastrointestinal: BS+, soft, NT/ND  Extremities: No peripheral edema b/l  Neurological: no focal deficits; strength grossly intact  Back: no CVAT b/l  Skin: No rashes, no nevi    LABS:                        12.5   7.51  )-----------( 205      ( 04 Jun 2022 03:30 )             39.5     06-04    137  |  107  |  24<H>  ----------------------------<  145<H>  4.2   |  20<L>  |  1.54<H>    Ca    8.6      04 Jun 2022 03:30  Phos  2.2     06-04  Mg     2.10     06-04        Urine Studies:        RADIOLOGY & ADDITIONAL STUDIES:      ASSESSMENT/RECOMMEND    Alyson Graham NP  [facility],   [phone] NEPHROLOGY     Patient seen and examined sitting in bed, reports feeling ok, denies pain, no sob, no lightheadedness, per family walked around earlier without complaints, currently in no acute distress.     MEDICATIONS  (STANDING):  aspirin enteric coated 81 milliGRAM(s) Oral daily  atorvastatin 80 milliGRAM(s) Oral at bedtime  BACItracin   Ointment 1 Application(s) Topical two times a day  cholecalciferol 1000 Unit(s) Oral daily  dextrose 5%. 1000 milliLiter(s) (50 mL/Hr) IV Continuous <Continuous>  dextrose 5%. 1000 milliLiter(s) (100 mL/Hr) IV Continuous <Continuous>  dextrose 50% Injectable 25 Gram(s) IV Push once  dextrose 50% Injectable 12.5 Gram(s) IV Push once  dextrose 50% Injectable 25 Gram(s) IV Push once  escitalopram 10 milliGRAM(s) Oral daily  gabapentin 300 milliGRAM(s) Oral two times a day  glucagon  Injectable 1 milliGRAM(s) IntraMuscular once  heparin   Injectable 5000 Unit(s) SubCutaneous every 12 hours  insulin glargine Injectable (LANTUS) 20 Unit(s) SubCutaneous <User Schedule>  insulin lispro (ADMELOG) corrective regimen sliding scale   SubCutaneous three times a day before meals  insulin lispro (ADMELOG) corrective regimen sliding scale   SubCutaneous at bedtime  pantoprazole    Tablet 40 milliGRAM(s) Oral before breakfast  sodium chloride 0.9%. 1000 milliLiter(s) (75 mL/Hr) IV Continuous <Continuous>  sodium chloride 0.9%. 1000 milliLiter(s) (100 mL/Hr) IV Continuous <Continuous>  ticagrelor 90 milliGRAM(s) Oral every 12 hours    VITALS:  T(C): , Max: 36.8 (06-03-22 @ 21:20)  T(F): , Max: 98.3 (06-03-22 @ 21:20)  HR: 79 (06-04-22 @ 12:50)  BP: 147/64 (06-04-22 @ 12:50)  RR: 18 (06-04-22 @ 12:50)  SpO2: 100% (06-04-22 @ 12:50)    PHYSICAL EXAM:  Constitutional: Frail; thin to cachectic  HEENT: DMM  Neck: Supple, No JVD  Respiratory: CTA-b/l  Cardiovascular: distant s1s2, (+)2/6 TODD  Gastrointestinal: BS+, soft, NT/ND  Extremities: No peripheral edema b/l  Neurological: no focal deficits; strength grossly intact  Back: no CVAT b/l  Skin: No rashes, no nevi    LABS:                        12.5   7.51  )-----------( 205      ( 04 Jun 2022 03:30 )             39.5     06-04    137  |  107  |  24<H>  ----------------------------<  145<H>  4.2   |  20<L>  |  1.54<H>    Ca    8.6      04 Jun 2022 03:30  Phos  2.2     06-04  Mg     2.10     06-04    RADIOLOGY & ADDITIONAL STUDIES:  ACC: [id number] EXAM:  MR ANGIO BRAIN WAW IC                        ACC: [id number] EXAM:  MR ANGIO NECK WAW IC                        ACC: [id number] EXAM:  MR BRAIN                        ACC: [id number] EXAM:  MR SPINE CERVICAL WAW IC      PROCEDURE DATE:  06/03/2022          INTERPRETATION:  INDICATIONS:  Syncope    BRAIN MRI:    TECHNIQUE:  Multiplanar imaging was performed using T1 weighted, T2   weighted and FLAIR sequences.  Diffusion weighted and SWI images were   obtained.Following intravenous contrast administration, T1-weighted   images were performed. 7.5 cc Gadavist were administered. 0 cc were   discarded.    COMPARISON EXAMINATION:  Brain CT 5/31/2022 and MR 7/7/2019    FINDINGS:  VENTRICLES AND SULCI:  Prominent in size compatible with age-appropriate   volume loss  INTRA-AXIAL:  Small patchy areas of white matter T2 hyperintensity are   seen compatible with mild microvascular-type changes, stable. No mass   effect or abnormal enhancement. No diffusion restriction to suggest acute   ischemia.  EXTRA-AXIAL:  No mass or collection.  VISUALIZED SINUSES:  Mild mucosal thickening  VISUALIZED MASTOIDS:  Normal.  CALVARIUM:  Normal.  CAROTID FLOW VOIDS:  Present.  MISCELLANEOUS:  Intraocular lens implants    BRAIN MRA:    TECHNIQUE:  MR angiography of the brain was performed using three   dimensional time-of-flight (3D-TOF) technique.  Source images and   multiple MIP images were evaluated.    COMPARISON EXAMINATION:  7/7/2019    FINDINGS:  INTERNAL CAROTID ARTERIES:  Again noted is segmental absent flow   enhancement within the right cavernous carotid artery likely representing   severe stenosis. There is narrowing of the right supraclinoid carotid   artery.  Mild narrowing of the left cavernous and supraclinoid segments is again   seen.  BASILAR ARTERY AND DISTAL VERTEBRAL ARTERIES:  Normal.  [place]:  The posterior communicating arteries are not well   seen. 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  CEREBRAL ARTERIES:  Mild narrowing of the right M1 segment is unchanged.  Marked narrowing of the left posterior cerebral artery is again seen.  MISCELLANEOUS:  None.      NECK MRA :    TECHNIQUE: MR angiography of the neck was performed using two-dimensional   and three-dimensional time of flight technique as well as   contrast-enhanced technique.    COMPARISON: None.    FINDINGS:  VISUALIZED AORTA AND GREAT VESSELS:  Unremarkable.  COMMON CAROTID ARTERIES:  Normal.  RIGHT INTERNAL AND EXTERNAL CAROTID ARTERIES:  Again noted is mild to   moderate narrowing of the right internal carotid artery approximately 3   cm above the bifurcation. There is moderate to marked narrowing of the   horizontal petrous segment of the internal carotid artery on the right,   stable. The right external carotid is patent  LEFT INTERNAL AND EXTERNAL CAROTID ARTERIES:  No significant stenosis  VERTEBRAL ARTERIES:  Normal.    All measurements were performed using standard NASCET criteria.    CERVICAL SPINE:    TECHNIQUE:  Sagittal T1 weighted, T2 weighted and STIR images of the   cervical spine as well as axial T2 weighted images were obtained.  Post   contrast sagittal and axial T1-weighted images were performed.    COMPARISON EXAMINATION: 5/31/2022    FINDINGS:  VERTEBRAL BODIES ANDDISCS: Vertebral bodies are normal in height and   signal intensity. Multilevel marginal osteophyte formation is seen.  ALIGNMENT:  No subluxations.  C2-C3 LEVEL:  Normal.  C3-C4 LEVEL:  Mild disc bulge/ridge  C4-C5 LEVEL:  Diffuse disc bulge/ridge with facet arthrosis more   prominent on the right and right greater than left foraminal stenosis  C5-C6 LEVEL:  Diffuse disc bulge/ridge with right greater than left facet   arthrosis and foraminal stenosis  C6-C7 LEVEL:  Normal.  C7-T1 LEVEL:   Normal.  SPINAL CORD: Normal.  SPINAL CANAL:  No other intradural or extradural defects are seen. No   abnormal enhancement.  MISCELLANEOUS:  None.    IMPRESSION:  BRAIN MRI:  No mass effect, acute cerebral ischemia or other evidence of acute   intracranial pathology.    BRAIN MRA:  Multivessel stenosis as described above without significant change from   the prior.    NECK MRA:  Mild to moderate narrowing of the right internal carotid artery without   significant change.    CERVICAL SPINE:  Multilevelcervical spondylosis with associated foraminal stenosis.    --- End of Report ---            MIGUELITO WAYNE MD; Attending Radiologist  This document has been electronically signed. Herb  3 2022  8:19PM

## 2022-06-04 NOTE — PROGRESS NOTE ADULT - ASSESSMENT
ASSESSMENT:  (1)Renal - nonproteinuric CKD 3b; resolving mild prerenally-mediated MABLE  (2)CV - orthostatic hypotension/associated syncope on admission. He received 500cc on admission. Is he still dry? Surprising otherwise that he would still be orthostatic, s/p d/c of all of his antihypertensive meds. Distant heart sounds and significant murmur on exam; has anything changed since 5/23?     ASSESSMENT:  (1)Renal - nonproteinuric CKD 3b; resolving mild prerenally-mediated MABLE  (2)CV - orthostatic hypotension/associated syncope on admission. He received 500cc on admission. Is he still dry? Surprising otherwise that he would still be orthostatic, s/p d/c of all of his antihypertensive meds. Distant heart sounds and significant murmur on exam; has anything changed since 5/23?    RECOMMEND:         ASSESSMENT:  (1)Renal - nonproteinuric CKD 3b; resolving mild prerenally-mediated MABLE  (2)CV - orthostatic hypotension/associated syncope on admission. He received 500cc on admission. Is he still dry? Surprising otherwise that he would still be orthostatic, s/p d/c of all of his antihypertensive meds. Distant heart sounds and significant murmur on exam; has anything changed since 5/23? Orthostatic negative today     RECOMMEND:  (1)Urine: lytes, creatinine  (2)Hold antihypertensives/diuretics/SGLT2 for now  (3)ECHO pending   (4)Orthostatics q shift  (5)Dose new meds for GFR 30-40ml/min    CHRISSY HolcombC  [facility]

## 2022-06-04 NOTE — PROGRESS NOTE ADULT - ASSESSMENT
89 y/o M with pmhx of CAD s/p CABG, stent recently in 5/2022, HTN, DM2, CKD, PVD with stent presented to the ED for new onset syncopal episodes. Admit for syncope work up needing telemetry.     Problem/Plan - 1:  ·  Problem: Syncope.   ·  Plan: Assessment:  - patient has multiple episodes of syncope at home  - unclear etiology: no signs of lightheadedness before the episodes which could suspect orthostatic hypotension. However the patient is on multiple BP medications  - no hx of seizures  - no chest pain, Troponins neg x1, EKG shows RBBB and 1st degree AV block which is similar to previous EKG  - Echo from 5/2022 - normal EF, mild aortic stenosis  - orthostatic positive. Resolved.  - hold BP meds  - does not need a repeat echo as it was done 2 weeks ago  - cardiology consulted and recommending EP consult  .  - Neurology help appreciated. MRI noted.   - EEG normal .    -EP consult noted and planning PPM.      Problem/Plan - 2:  ·  Problem: Fall.   ·  Plan: Assessment:  - patient fell on concrete while sitting on the stairs  - No head trauma, but has skin scrapes on foot and elbow    Plan:  - PT eval  - evaluate for etiology for fall as above.     Problem/Plan - 3:  ·  Problem: Benign essential HTN.   ·  Plan: - hold BP meds for now for syncope work up, will give one dose of norvasc for now for HTN.     Problem/Plan - 4:  ·  Problem: DM2 (diabetes mellitus, type 2).   ·  Plan: - 10U lantus QHs.     Problem/Plan - 5:  ·  Problem: CAD (coronary artery disease).   ·  Plan: - c/w DAPT.     Problem/Plan - 6:  ·  Problem: Stage 3 chronic kidney disease.   ·  Plan: - monitor for now.     Problem/Plan - 7:  ·  Problem: Prophylactic measure.   ·  Plan: - heparin subq for dvt ppx.    Dispo : DC planning pending above. D/W Son in room and Neurologist son over the phone. .

## 2022-06-04 NOTE — PROGRESS NOTE ADULT - SUBJECTIVE AND OBJECTIVE BOX
Date of Service  : 06-04-22     INTERVAL HPI/OVERNIGHT EVENTS: I feel better .   Vital Signs Last 24 Hrs  T(C): 36.7 (04 Jun 2022 12:50), Max: 36.8 (04 Jun 2022 05:20)  T(F): 98.1 (04 Jun 2022 12:50), Max: 98.3 (04 Jun 2022 05:20)  HR: 79 (04 Jun 2022 12:50) (79 - 84)  BP: 121/45 (04 Jun 2022 14:07) (118/77 - 147/64)  BP(mean): --  RR: 18 (04 Jun 2022 12:50) (18 - 18)  SpO2: 100% (04 Jun 2022 12:50) (100% - 100%)  I&O's Summary    MEDICATIONS  (STANDING):  aspirin enteric coated 81 milliGRAM(s) Oral daily  atorvastatin 80 milliGRAM(s) Oral at bedtime  BACItracin   Ointment 1 Application(s) Topical two times a day  cholecalciferol 1000 Unit(s) Oral daily  dextrose 5%. 1000 milliLiter(s) (100 mL/Hr) IV Continuous <Continuous>  dextrose 5%. 1000 milliLiter(s) (50 mL/Hr) IV Continuous <Continuous>  dextrose 50% Injectable 25 Gram(s) IV Push once  dextrose 50% Injectable 12.5 Gram(s) IV Push once  dextrose 50% Injectable 25 Gram(s) IV Push once  escitalopram 10 milliGRAM(s) Oral daily  gabapentin 300 milliGRAM(s) Oral two times a day  glucagon  Injectable 1 milliGRAM(s) IntraMuscular once  heparin   Injectable 5000 Unit(s) SubCutaneous every 12 hours  insulin glargine Injectable (LANTUS) 20 Unit(s) SubCutaneous <User Schedule>  insulin lispro (ADMELOG) corrective regimen sliding scale   SubCutaneous three times a day before meals  insulin lispro (ADMELOG) corrective regimen sliding scale   SubCutaneous at bedtime  pantoprazole    Tablet 40 milliGRAM(s) Oral before breakfast  sodium chloride 0.9%. 1000 milliLiter(s) (75 mL/Hr) IV Continuous <Continuous>  sodium chloride 0.9%. 1000 milliLiter(s) (100 mL/Hr) IV Continuous <Continuous>  ticagrelor 90 milliGRAM(s) Oral every 12 hours    MEDICATIONS  (PRN):  acetaminophen     Tablet .. 650 milliGRAM(s) Oral every 6 hours PRN Temp greater or equal to 38C (100.4F), Mild Pain (1 - 3)  dextrose Oral Gel 15 Gram(s) Oral once PRN Blood Glucose LESS THAN 70 milliGRAM(s)/deciliter  melatonin 3 milliGRAM(s) Oral at bedtime PRN Insomnia    LABS:                        12.5   7.51  )-----------( 205      ( 04 Jun 2022 03:30 )             39.5     06-04    137  |  107  |  24<H>  ----------------------------<  145<H>  4.2   |  20<L>  |  1.54<H>    Ca    8.6      04 Jun 2022 03:30  Phos  2.2     06-04  Mg     2.10     06-04          CAPILLARY BLOOD GLUCOSE      POCT Blood Glucose.: 131 mg/dL (04 Jun 2022 22:22)  POCT Blood Glucose.: 241 mg/dL (04 Jun 2022 17:20)  POCT Blood Glucose.: 217 mg/dL (04 Jun 2022 12:32)  POCT Blood Glucose.: 93 mg/dL (04 Jun 2022 09:12)          REVIEW OF SYSTEMS:  CONSTITUTIONAL: No fever, weight loss, or fatigue  EYES: No eye pain, visual disturbances, or discharge  ENMT:  No difficulty hearing, tinnitus, vertigo; No sinus or throat pain  NECK: No pain or stiffness  RESPIRATORY: No cough, wheezing, chills or hemoptysis; No shortness of breath  CARDIOVASCULAR: No chest pain, palpitations, dizziness, or leg swelling  GASTROINTESTINAL: No abdominal or epigastric pain. No nausea, vomiting, or hematemesis; No diarrhea or constipation. No melena or hematochezia.  GENITOURINARY: No dysuria, frequency, hematuria, or incontinence  NEUROLOGICAL: No headaches, memory loss, loss of strength, numbness, or tremors    Consultant(s) Notes Reviewed:  [x ] YES  [ ] NO    PHYSICAL EXAM:  GENERAL: NAD, well-groomed, well-developed, not in any distress ,  HEAD:  Atraumatic, Normocephalic  NECK: Supple, No JVD, Normal thyroid  NERVOUS SYSTEM:  Alert & Oriented X3, No focal deficit   CHEST/LUNG: Good air entry bilateral with no  rales, rhonchi, wheezing, or rubs  HEART: Regular rate and rhythm; No murmurs, rubs, or gallops  ABDOMEN: Soft, Nontender, Nondistended; Bowel sounds present  EXTREMITIES:  2+ Peripheral Pulses, No clubbing, cyanosis, or edema    Care Discussed with Consultants/Other Providers [ x] YES  [ ] NO

## 2022-06-05 LAB
ANION GAP SERPL CALC-SCNC: 12 MMOL/L — SIGNIFICANT CHANGE UP (ref 7–14)
BASOPHILS # BLD AUTO: 0.04 K/UL — SIGNIFICANT CHANGE UP (ref 0–0.2)
BASOPHILS NFR BLD AUTO: 0.5 % — SIGNIFICANT CHANGE UP (ref 0–2)
BUN SERPL-MCNC: 22 MG/DL — SIGNIFICANT CHANGE UP (ref 7–23)
CALCIUM SERPL-MCNC: 8.6 MG/DL — SIGNIFICANT CHANGE UP (ref 8.4–10.5)
CHLORIDE SERPL-SCNC: 106 MMOL/L — SIGNIFICANT CHANGE UP (ref 98–107)
CO2 SERPL-SCNC: 18 MMOL/L — LOW (ref 22–31)
CREAT SERPL-MCNC: 1.53 MG/DL — HIGH (ref 0.5–1.3)
EGFR: 43 ML/MIN/1.73M2 — LOW
EOSINOPHIL # BLD AUTO: 0.33 K/UL — SIGNIFICANT CHANGE UP (ref 0–0.5)
EOSINOPHIL NFR BLD AUTO: 4.2 % — SIGNIFICANT CHANGE UP (ref 0–6)
GLUCOSE SERPL-MCNC: 116 MG/DL — HIGH (ref 70–99)
HCT VFR BLD CALC: 38.2 % — LOW (ref 39–50)
HGB BLD-MCNC: 12.4 G/DL — LOW (ref 13–17)
IANC: 4.52 K/UL — SIGNIFICANT CHANGE UP (ref 1.8–7.4)
IMM GRANULOCYTES NFR BLD AUTO: 0.4 % — SIGNIFICANT CHANGE UP (ref 0–1.5)
LYMPHOCYTES # BLD AUTO: 2.11 K/UL — SIGNIFICANT CHANGE UP (ref 1–3.3)
LYMPHOCYTES # BLD AUTO: 27.1 % — SIGNIFICANT CHANGE UP (ref 13–44)
MAGNESIUM SERPL-MCNC: 2.1 MG/DL — SIGNIFICANT CHANGE UP (ref 1.6–2.6)
MCHC RBC-ENTMCNC: 30 PG — SIGNIFICANT CHANGE UP (ref 27–34)
MCHC RBC-ENTMCNC: 32.5 GM/DL — SIGNIFICANT CHANGE UP (ref 32–36)
MCV RBC AUTO: 92.5 FL — SIGNIFICANT CHANGE UP (ref 80–100)
MONOCYTES # BLD AUTO: 0.75 K/UL — SIGNIFICANT CHANGE UP (ref 0–0.9)
MONOCYTES NFR BLD AUTO: 9.6 % — SIGNIFICANT CHANGE UP (ref 2–14)
NEUTROPHILS # BLD AUTO: 4.52 K/UL — SIGNIFICANT CHANGE UP (ref 1.8–7.4)
NEUTROPHILS NFR BLD AUTO: 58.2 % — SIGNIFICANT CHANGE UP (ref 43–77)
NRBC # BLD: 0 /100 WBCS — SIGNIFICANT CHANGE UP
NRBC # FLD: 0 K/UL — SIGNIFICANT CHANGE UP
PHOSPHATE SERPL-MCNC: 2.7 MG/DL — SIGNIFICANT CHANGE UP (ref 2.5–4.5)
PLATELET # BLD AUTO: 200 K/UL — SIGNIFICANT CHANGE UP (ref 150–400)
POTASSIUM SERPL-MCNC: 4.7 MMOL/L — SIGNIFICANT CHANGE UP (ref 3.5–5.3)
POTASSIUM SERPL-SCNC: 4.7 MMOL/L — SIGNIFICANT CHANGE UP (ref 3.5–5.3)
RBC # BLD: 4.13 M/UL — LOW (ref 4.2–5.8)
RBC # FLD: 13.2 % — SIGNIFICANT CHANGE UP (ref 10.3–14.5)
SODIUM SERPL-SCNC: 136 MMOL/L — SIGNIFICANT CHANGE UP (ref 135–145)
WBC # BLD: 7.78 K/UL — SIGNIFICANT CHANGE UP (ref 3.8–10.5)
WBC # FLD AUTO: 7.78 K/UL — SIGNIFICANT CHANGE UP (ref 3.8–10.5)

## 2022-06-05 RX ADMIN — HEPARIN SODIUM 5000 UNIT(S): 5000 INJECTION INTRAVENOUS; SUBCUTANEOUS at 17:19

## 2022-06-05 RX ADMIN — Medication 1 APPLICATION(S): at 17:16

## 2022-06-05 RX ADMIN — GABAPENTIN 300 MILLIGRAM(S): 400 CAPSULE ORAL at 17:15

## 2022-06-05 RX ADMIN — Medication 81 MILLIGRAM(S): at 17:16

## 2022-06-05 RX ADMIN — Medication 1000 UNIT(S): at 17:15

## 2022-06-05 RX ADMIN — TICAGRELOR 90 MILLIGRAM(S): 90 TABLET ORAL at 06:26

## 2022-06-05 RX ADMIN — Medication 650 MILLIGRAM(S): at 06:30

## 2022-06-05 RX ADMIN — GABAPENTIN 300 MILLIGRAM(S): 400 CAPSULE ORAL at 06:26

## 2022-06-05 RX ADMIN — ESCITALOPRAM OXALATE 10 MILLIGRAM(S): 10 TABLET, FILM COATED ORAL at 17:15

## 2022-06-05 RX ADMIN — HEPARIN SODIUM 5000 UNIT(S): 5000 INJECTION INTRAVENOUS; SUBCUTANEOUS at 06:26

## 2022-06-05 RX ADMIN — PANTOPRAZOLE SODIUM 40 MILLIGRAM(S): 20 TABLET, DELAYED RELEASE ORAL at 08:12

## 2022-06-05 RX ADMIN — TICAGRELOR 90 MILLIGRAM(S): 90 TABLET ORAL at 17:15

## 2022-06-05 RX ADMIN — Medication 650 MILLIGRAM(S): at 18:10

## 2022-06-05 RX ADMIN — Medication 1 APPLICATION(S): at 06:28

## 2022-06-05 RX ADMIN — ATORVASTATIN CALCIUM 80 MILLIGRAM(S): 80 TABLET, FILM COATED ORAL at 21:48

## 2022-06-05 RX ADMIN — Medication 650 MILLIGRAM(S): at 17:18

## 2022-06-05 RX ADMIN — Medication 650 MILLIGRAM(S): at 07:30

## 2022-06-05 NOTE — PROGRESS NOTE ADULT - ASSESSMENT
ASSESSMENT:  (1)Renal - nonproteinuric CKD 3b; resolving mild prerenally-mediated MABLE  (2)CV - orthostatic hypotension/associated syncope on admission. He received 500cc on admission. Is he still dry? Surprising otherwise that he would still be orthostatic, s/p d/c of all of his antihypertensive meds. Distant heart sounds and significant murmur on exam; has anything changed since 5/23? Orthostatic negative today      ASSESSMENT:  (1)Renal - nonproteinuric CKD 3b; resolving mild prerenally-mediated MABLE  (2)CV - orthostatic hypotension/associated syncope on admission. He received 500cc on admission. Is he still dry? Surprising otherwise that he would still be orthostatic, s/p d/c of all of his antihypertensive meds. Distant heart sounds and significant murmur on exam; has anything changed since 5/23? Orthostatic negative today   (3)EP - planned for PPM tomorrow    RECOMMEND:  (1)Urine: lytes, creatinine  (2)Hold antihypertensives/diuretics/SGLT2 for now  (3)ECHO pending   (4)Orthostatics q shift  (5)Dose new meds for GFR 30-40ml/min    CHRISSY HolcombC  [facility]   [phone]

## 2022-06-05 NOTE — CONSULT NOTE ADULT - SUBJECTIVE AND OBJECTIVE BOX
HPI:  Patient is a 88y Male with PMH significant for CAD w/ CABG, HTN, Dm2, CKD, PVD admitted for syncope workup, ENT consulted for L ear pain x 3 days. Patient endorses dull L sided ear pain that is constant, starts anterior to the ear and radiates to the back. Not worse with chewing No fevers, no drainage, no hearing changes, no tinnitus, no vertigo, no history of recurrent ear infections or ear surgery. He has DM2 with stable glucose this admission. WBC not elevated.    CTH from a few days ago without mastoid or ME effusion.       Physical Exam  T(C): 36.7 (06-05-22 @ 13:20), Max: 36.7 (06-04-22 @ 21:40)  HR: 80 (06-05-22 @ 13:20) (77 - 80)  BP: 150/60 (06-05-22 @ 13:20) (150/60 - 154/60)  RR: 18 (06-05-22 @ 13:20) (17 - 18)  SpO2: 100% (06-05-22 @ 13:20) (100% - 100%)  General: NAD, A+Ox3  Resp: No respiratory distress, stridor, or stertor  Voice quality: normal  Face:  Symmetric without masses or lesions  AD: Pinna wnl, EAC clear, TM intact, no effusion  AS: Pinna wnl, EAC clear, TM intact, no effusion  Nose: nasal cavity clear bilaterally, inferior turbinates normal, mucosa normal without crusting or bleeding  OC/OP: tongue normal, floor of mouth wnl, no masses or lesions, OP clear  Neck: soft/flat, no LAD    A/P: 88y Male with L sided ear pain x 3 days - dull and constant, starts from the front and radiates to the back. Exam wnl, no signs of infection on exam or on recent imaging. 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     - conservative management of TMJ - warm compresses, rest, tylenol as needed. If no improvement attempt soft/non-chew diet  - no need for abx or ear drops   - if no improvement consider outpatient follow up with OMFS   - d/w attending, page with questions

## 2022-06-05 NOTE — PROGRESS NOTE ADULT - SUBJECTIVE AND OBJECTIVE BOX
Date of Service  : 06-05-22     INTERVAL HPI/OVERNIGHT EVENTS: I feel fine . Not sure about PPM tomorrow so wants EP to discuss with his Neurologist son.   Vital Signs Last 24 Hrs  T(C): 36.7 (05 Jun 2022 13:20), Max: 36.7 (04 Jun 2022 21:40)  T(F): 98 (05 Jun 2022 13:20), Max: 98.1 (04 Jun 2022 21:40)  HR: 80 (05 Jun 2022 13:20) (77 - 80)  BP: 150/60 (05 Jun 2022 13:20) (150/60 - 154/60)  BP(mean): --  RR: 18 (05 Jun 2022 13:20) (17 - 18)  SpO2: 100% (05 Jun 2022 13:20) (100% - 100%)  I&O's Summary    05 Jun 2022 07:01  -  05 Jun 2022 20:28  --------------------------------------------------------  IN: 240 mL / OUT: 900 mL / NET: -660 mL      MEDICATIONS  (STANDING):  aspirin enteric coated 81 milliGRAM(s) Oral daily  atorvastatin 80 milliGRAM(s) Oral at bedtime  BACItracin   Ointment 1 Application(s) Topical two times a day  cholecalciferol 1000 Unit(s) Oral daily  dextrose 5%. 1000 milliLiter(s) (100 mL/Hr) IV Continuous <Continuous>  dextrose 5%. 1000 milliLiter(s) (50 mL/Hr) IV Continuous <Continuous>  dextrose 50% Injectable 25 Gram(s) IV Push once  dextrose 50% Injectable 12.5 Gram(s) IV Push once  dextrose 50% Injectable 25 Gram(s) IV Push once  escitalopram 10 milliGRAM(s) Oral daily  gabapentin 300 milliGRAM(s) Oral two times a day  glucagon  Injectable 1 milliGRAM(s) IntraMuscular once  heparin   Injectable 5000 Unit(s) SubCutaneous every 12 hours  insulin glargine Injectable (LANTUS) 20 Unit(s) SubCutaneous <User Schedule>  insulin lispro (ADMELOG) corrective regimen sliding scale   SubCutaneous at bedtime  insulin lispro (ADMELOG) corrective regimen sliding scale   SubCutaneous three times a day before meals  pantoprazole    Tablet 40 milliGRAM(s) Oral before breakfast  sodium chloride 0.9%. 1000 milliLiter(s) (75 mL/Hr) IV Continuous <Continuous>  sodium chloride 0.9%. 1000 milliLiter(s) (100 mL/Hr) IV Continuous <Continuous>  ticagrelor 90 milliGRAM(s) Oral every 12 hours    MEDICATIONS  (PRN):  acetaminophen     Tablet .. 650 milliGRAM(s) Oral every 6 hours PRN Temp greater or equal to 38C (100.4F), Mild Pain (1 - 3)  dextrose Oral Gel 15 Gram(s) Oral once PRN Blood Glucose LESS THAN 70 milliGRAM(s)/deciliter  melatonin 3 milliGRAM(s) Oral at bedtime PRN Insomnia    LABS:                        12.4   7.78  )-----------( 200      ( 05 Jun 2022 03:15 )             38.2     06-05    136  |  106  |  22  ----------------------------<  116<H>  4.7   |  18<L>  |  1.53<H>    Ca    8.6      05 Jun 2022 03:15  Phos  2.7     06-05  Mg     2.10     06-05          CAPILLARY BLOOD GLUCOSE      POCT Blood Glucose.: 131 mg/dL (04 Jun 2022 22:22)          REVIEW OF SYSTEMS:  CONSTITUTIONAL: No fever, weight loss, or fatigue  EYES: No eye pain, visual disturbances, or discharge  ENMT:  No difficulty hearing, tinnitus, vertigo; No sinus or throat pain  NECK: No pain or stiffness  RESPIRATORY: No cough, wheezing, chills or hemoptysis; No shortness of breath  CARDIOVASCULAR: No chest pain, palpitations, dizziness, or leg swelling  GASTROINTESTINAL: No abdominal or epigastric pain. No nausea, vomiting, or hematemesis; No diarrhea or constipation. No melena or hematochezia.  GENITOURINARY: No dysuria, frequency, hematuria, or incontinence  NEUROLOGICAL: No headaches, memory loss, loss of strength, numbness, or tremors      Consultant(s) Notes Reviewed:  [x ] YES  [ ] NO    PHYSICAL EXAM:  GENERAL: NAD, well-groomed, well-developed,not in any distress ,  HEAD:  Atraumatic, Normocephalic  NECK: Supple, No JVD, Normal thyroid  NERVOUS SYSTEM:  Alert & Oriented X3, No focal deficit   CHEST/LUNG: Good air entry bilateral with no  rales, rhonchi, wheezing, or rubs  HEART: Regular rate and rhythm; No murmurs, rubs, or gallops  ABDOMEN: Soft, Nontender, Nondistended; Bowel sounds present  EXTREMITIES:  2+ Peripheral Pulses, No clubbing, cyanosis, or edema    Care Discussed with Consultants/Other Providers [ x] YES  [ ] NO

## 2022-06-05 NOTE — PROGRESS NOTE ADULT - ASSESSMENT
89 y/o M with pmhx of CAD s/p CABG, stent recently in 5/2022, HTN, DM2, CKD, PVD with stent presented to the ED for new onset syncopal episodes. Admit for syncope work up needing telemetry.     Problem/Plan - 1:  ·  Problem: Syncope.   ·  Plan: Assessment:  - patient has multiple episodes of syncope at home  - unclear etiology: no signs of lightheadedness before the episodes which could suspect orthostatic hypotension. However the patient is on multiple BP medications  - no hx of seizures  - no chest pain, Troponins neg x1, EKG shows RBBB and 1st degree AV block which is similar to previous EKG  - Echo from 5/2022 - normal EF, mild aortic stenosis  - orthostatic positive. Resolved.  - hold BP meds  - does not need a repeat echo as it was done 2 weeks ago  - cardiology consulted and recommending EP consult  .  - Neurology help appreciated. MRI noted.   - EEG normal .    -EP consult noted and planning PPM. So NPO from midnight . Neurologist Son wants to talk to EP before procedure tomorrow.      Problem/Plan - 2:  ·  Problem: Fall.   ·  Plan: Assessment:  - patient fell on concrete while sitting on the stairs  - No head trauma, but has skin scrapes on foot and elbow    Plan:  - PT eval  - evaluate for etiology for fall as above.     Problem/Plan - 3:  ·  Problem: Benign essential HTN.   ·  Plan: - hold BP meds for now for syncope work up, will give one dose of norvasc for now for HTN.     Problem/Plan - 4:  ·  Problem: DM2 (diabetes mellitus, type 2).   ·  Plan: - 10U lantus QHs.     Problem/Plan - 5:  ·  Problem: CAD (coronary artery disease).   ·  Plan: - c/w DAPT.     Problem/Plan - 6:  ·  Problem: Stage 3 chronic kidney disease.   ·  Plan: - monitor for now.     Problem/Plan - 7:  ·  Problem: Prophylactic measure.   ·  Plan: - heparin subq for dvt ppx.    Dispo : DC planning pending above.

## 2022-06-06 LAB
ANION GAP SERPL CALC-SCNC: 12 MMOL/L — SIGNIFICANT CHANGE UP (ref 7–14)
BASOPHILS # BLD AUTO: 0.05 K/UL — SIGNIFICANT CHANGE UP (ref 0–0.2)
BASOPHILS NFR BLD AUTO: 0.8 % — SIGNIFICANT CHANGE UP (ref 0–2)
BUN SERPL-MCNC: 20 MG/DL — SIGNIFICANT CHANGE UP (ref 7–23)
CALCIUM SERPL-MCNC: 8.6 MG/DL — SIGNIFICANT CHANGE UP (ref 8.4–10.5)
CHLORIDE SERPL-SCNC: 103 MMOL/L — SIGNIFICANT CHANGE UP (ref 98–107)
CO2 SERPL-SCNC: 21 MMOL/L — LOW (ref 22–31)
CREAT SERPL-MCNC: 1.53 MG/DL — HIGH (ref 0.5–1.3)
EGFR: 43 ML/MIN/1.73M2 — LOW
EOSINOPHIL # BLD AUTO: 0.31 K/UL — SIGNIFICANT CHANGE UP (ref 0–0.5)
EOSINOPHIL NFR BLD AUTO: 4.7 % — SIGNIFICANT CHANGE UP (ref 0–6)
GLUCOSE BLDC GLUCOMTR-MCNC: 117 MG/DL — HIGH (ref 70–99)
GLUCOSE BLDC GLUCOMTR-MCNC: 161 MG/DL — HIGH (ref 70–99)
GLUCOSE SERPL-MCNC: 181 MG/DL — HIGH (ref 70–99)
HCT VFR BLD CALC: 35.3 % — LOW (ref 39–50)
HGB BLD-MCNC: 11.6 G/DL — LOW (ref 13–17)
IANC: 3.69 K/UL — SIGNIFICANT CHANGE UP (ref 1.8–7.4)
IMM GRANULOCYTES NFR BLD AUTO: 0.3 % — SIGNIFICANT CHANGE UP (ref 0–1.5)
LYMPHOCYTES # BLD AUTO: 1.81 K/UL — SIGNIFICANT CHANGE UP (ref 1–3.3)
LYMPHOCYTES # BLD AUTO: 27.7 % — SIGNIFICANT CHANGE UP (ref 13–44)
MAGNESIUM SERPL-MCNC: 1.9 MG/DL — SIGNIFICANT CHANGE UP (ref 1.6–2.6)
MCHC RBC-ENTMCNC: 29.7 PG — SIGNIFICANT CHANGE UP (ref 27–34)
MCHC RBC-ENTMCNC: 32.9 GM/DL — SIGNIFICANT CHANGE UP (ref 32–36)
MCV RBC AUTO: 90.3 FL — SIGNIFICANT CHANGE UP (ref 80–100)
MONOCYTES # BLD AUTO: 0.65 K/UL — SIGNIFICANT CHANGE UP (ref 0–0.9)
MONOCYTES NFR BLD AUTO: 10 % — SIGNIFICANT CHANGE UP (ref 2–14)
NEUTROPHILS # BLD AUTO: 3.69 K/UL — SIGNIFICANT CHANGE UP (ref 1.8–7.4)
NEUTROPHILS NFR BLD AUTO: 56.5 % — SIGNIFICANT CHANGE UP (ref 43–77)
NRBC # BLD: 0 /100 WBCS — SIGNIFICANT CHANGE UP
NRBC # FLD: 0 K/UL — SIGNIFICANT CHANGE UP
PHOSPHATE SERPL-MCNC: 2.6 MG/DL — SIGNIFICANT CHANGE UP (ref 2.5–4.5)
PLATELET # BLD AUTO: 194 K/UL — SIGNIFICANT CHANGE UP (ref 150–400)
POTASSIUM SERPL-MCNC: 4.4 MMOL/L — SIGNIFICANT CHANGE UP (ref 3.5–5.3)
POTASSIUM SERPL-SCNC: 4.4 MMOL/L — SIGNIFICANT CHANGE UP (ref 3.5–5.3)
RBC # BLD: 3.91 M/UL — LOW (ref 4.2–5.8)
RBC # FLD: 13.2 % — SIGNIFICANT CHANGE UP (ref 10.3–14.5)
SODIUM SERPL-SCNC: 136 MMOL/L — SIGNIFICANT CHANGE UP (ref 135–145)
WBC # BLD: 6.53 K/UL — SIGNIFICANT CHANGE UP (ref 3.8–10.5)
WBC # FLD AUTO: 6.53 K/UL — SIGNIFICANT CHANGE UP (ref 3.8–10.5)

## 2022-06-06 PROCEDURE — 93010 ELECTROCARDIOGRAM REPORT: CPT

## 2022-06-06 PROCEDURE — 99233 SBSQ HOSP IP/OBS HIGH 50: CPT

## 2022-06-06 PROCEDURE — 99232 SBSQ HOSP IP/OBS MODERATE 35: CPT

## 2022-06-06 PROCEDURE — 33208 INSRT HEART PM ATRIAL & VENT: CPT | Mod: KX

## 2022-06-06 PROCEDURE — 71045 X-RAY EXAM CHEST 1 VIEW: CPT | Mod: 26

## 2022-06-06 RX ORDER — LOSARTAN POTASSIUM 100 MG/1
50 TABLET, FILM COATED ORAL DAILY
Refills: 0 | Status: DISCONTINUED | OUTPATIENT
Start: 2022-06-06 | End: 2022-06-07

## 2022-06-06 RX ORDER — CEFAZOLIN SODIUM 1 G
1000 VIAL (EA) INJECTION EVERY 8 HOURS
Refills: 0 | Status: COMPLETED | OUTPATIENT
Start: 2022-06-06 | End: 2022-06-07

## 2022-06-06 RX ADMIN — TICAGRELOR 90 MILLIGRAM(S): 90 TABLET ORAL at 19:09

## 2022-06-06 RX ADMIN — GABAPENTIN 300 MILLIGRAM(S): 400 CAPSULE ORAL at 22:19

## 2022-06-06 RX ADMIN — Medication 1 APPLICATION(S): at 06:37

## 2022-06-06 RX ADMIN — HEPARIN SODIUM 5000 UNIT(S): 5000 INJECTION INTRAVENOUS; SUBCUTANEOUS at 06:38

## 2022-06-06 RX ADMIN — LOSARTAN POTASSIUM 50 MILLIGRAM(S): 100 TABLET, FILM COATED ORAL at 09:53

## 2022-06-06 RX ADMIN — Medication 100 MILLIGRAM(S): at 22:24

## 2022-06-06 RX ADMIN — ATORVASTATIN CALCIUM 80 MILLIGRAM(S): 80 TABLET, FILM COATED ORAL at 22:19

## 2022-06-06 NOTE — PROGRESS NOTE ADULT - ASSESSMENT
syncope, while orthostatic pt otherwise has indication for PPM as per EP  cont to hold bblocker until PPM in place  consider hydralazine for additional afterload reduction.    house cardiology will sign off. please call back with questions or concerns.

## 2022-06-06 NOTE — CHART NOTE - NSCHARTNOTEFT_GEN_A_CORE
Pt is s/p pacemaker. Pt has no acute complaints and resting comfortably in bed. Denies pain, dizziness, numbness, SOB, cough, CP.     T(C): 36.2 (06-06-22 @ 21:42), Max: 36.8 (06-06-22 @ 01:42)  HR: 85 (06-06-22 @ 21:42) (74 - 87)  BP: 159/63 (06-06-22 @ 21:42) (124/60 - 171/59)  RR: 18 (06-06-22 @ 21:42) (17 - 18)  SpO2: 99% (06-06-22 @ 21:42) (97% - 100%)    Left anterior wall is stable.   dressing is intact and dry.   No swelling or hematoma   f/u CXR    Will continue to monitor.

## 2022-06-06 NOTE — PROGRESS NOTE ADULT - SUBJECTIVE AND OBJECTIVE BOX
Cardiology Progress Note    Interval Events:  No significant events      MEDICATIONS:  aspirin enteric coated 81 milliGRAM(s) Oral daily  heparin   Injectable 5000 Unit(s) SubCutaneous every 12 hours  losartan 50 milliGRAM(s) Oral daily  ticagrelor 90 milliGRAM(s) Oral every 12 hours  acetaminophen     Tablet .. 650 milliGRAM(s) Oral every 6 hours PRN  escitalopram 10 milliGRAM(s) Oral daily  gabapentin 300 milliGRAM(s) Oral two times a day  melatonin 3 milliGRAM(s) Oral at bedtime PRN  pantoprazole    Tablet 40 milliGRAM(s) Oral before breakfast  atorvastatin 80 milliGRAM(s) Oral at bedtime  glucagon  Injectable 1 milliGRAM(s) IntraMuscular once  insulin glargine Injectable (LANTUS) 20 Unit(s) SubCutaneous <User Schedule>  insulin lispro (ADMELOG) corrective regimen sliding scale   SubCutaneous three times a day before meals  insulin lispro (ADMELOG) corrective regimen sliding scale   SubCutaneous at bedtime    PHYSICAL EXAM:  T(C): 36.7 (06-06-22 @ 10:50), Max: 36.9 (06-05-22 @ 21:33)  HR: 76 (06-06-22 @ 10:50) (74 - 82)  BP: 171/59 (06-06-22 @ 10:50) (136/60 - 171/59)  RR: 18 (06-06-22 @ 10:50) (17 - 18)  SpO2: 100% (06-06-22 @ 10:50) (98% - 100%)  Wt(kg): --  I&O's Summary    05 Jun 2022 07:01  -  06 Jun 2022 07:00  --------------------------------------------------------  IN: 240 mL / OUT: 900 mL / NET: -660 mL    Appearance: No acute distress  HEENT:   mmm  Cardiovascular: Normal S1 S2, no elevated JVP, no edema  Respiratory: Lungs clear to auscultation, good air movement  Psychiatry: Mood & affect appropriate  Gastrointestinal:  soft nt   Skin: no cyanosis	  Neurologic: grossly non-focal    LABS:	 	  CBC Full  -  ( 06 Jun 2022 05:21 )  WBC Count : 6.53 K/uL  Hemoglobin : 11.6 g/dL  Hematocrit : 35.3 %  Platelet Count - Automated : 194 K/uL    06-06  136  |  103  |  20  ----------------------------<  181<H>  4.4   |  21<L>  |  1.53<H>    06-05  136  |  106  |  22  ----------------------------<  116<H>  4.7   |  18<L>  |  1.53<H>    Ca    8.6      06 Jun 2022 05:21  Ca    8.6      05 Jun 2022 03:15  Phos  2.6     06-06  Phos  2.7     06-05  Mg     1.90     06-06  Mg     2.10     06-05    TELEMETRY: 	 SR   ECG:  	  RADIOLOGY:  OTHER:

## 2022-06-06 NOTE — PROGRESS NOTE ADULT - SUBJECTIVE AND OBJECTIVE BOX
Overnight events noted      VITAL:  T(C): , Max: 36.9 (06-05-22 @ 21:33)  T(F): , Max: 98.4 (06-05-22 @ 21:33)  HR: 76 (06-06-22 @ 10:50)  BP: 171/59 (06-06-22 @ 10:50)  BP(mean): --  RR: 18 (06-06-22 @ 10:50)  SpO2: 100% (06-06-22 @ 10:50)    (6/6/22, 1a, lying)  - 135/60, 78  (6/6/22, 1a, standing) - 120/55, 104        PHYSICAL EXAM:  Constitutional: Frail; thin to cachectic  HEENT: DMM  Neck: Supple, No JVD  Respiratory: CTA-b/l  Cardiovascular: distant s1s2, (+)2/6 TODD  Gastrointestinal: BS+, soft, NT/ND  Extremities: No peripheral edema b/l  Neurological: no focal deficits; strength grossly intact  Back: no CVAT b/l  Skin: No rashes, no nevi      LABS:                        11.6   6.53  )-----------( 194      ( 06 Jun 2022 05:21 )             35.3     Na(136)/K(4.4)/Cl(103)/HCO3(21)/BUN(20)/Cr(1.53)Glu(181)/Ca(8.6)/Mg(1.90)/PO4(2.6)    06-06 @ 05:21  Na(136)/K(4.7)/Cl(106)/HCO3(18)/BUN(22)/Cr(1.53)Glu(116)/Ca(8.6)/Mg(2.10)/PO4(2.7)    06-05 @ 03:15  Na(137)/K(4.2)/Cl(107)/HCO3(20)/BUN(24)/Cr(1.54)Glu(145)/Ca(8.6)/Mg(2.10)/PO4(2.2)    06-04 @ 03:30      IMPRESSION: 88M w/ HTN, DM2, CAD-CABG, and HFpEF, 5/31/22 p/w falls/orthostatic hypotension    (1)Renal - nonproteinuric CKD 3b; resolving mild prerenally-mediated MABLE  (2)CV - orthostatic hypotension/associated syncope on admission. Now improved  (3)EP - for PPM      RECOMMEND:  (1)No objection to Losartan as ordered  (2)Dose new meds for GFR 30-40ml/min   (3)PPM per EP            Davey Chacko MD  [facility]  Office: [phone]  Cell: [phone]       No pain, no sob      VITAL:  T(C): , Max: 36.9 (06-05-22 @ 21:33)  T(F): , Max: 98.4 (06-05-22 @ 21:33)  HR: 76 (06-06-22 @ 10:50)  BP: 171/59 (06-06-22 @ 10:50)  RR: 18 (06-06-22 @ 10:50)  SpO2: 100% (06-06-22 @ 10:50)    (6/6/22, 1a, lying)  - 135/60, 78  (6/6/22, 1a, standing) - 120/55, 104        PHYSICAL EXAM:  Constitutional: Frail; thin to cachectic  HEENT: DMM  Neck: Supple, No JVD  Respiratory: CTA-b/l  Cardiovascular: distant s1s2, (+)2/6 TODD  Gastrointestinal: BS+, soft, NT/ND  Extremities: No peripheral edema b/l  Neurological: no focal deficits; strength grossly intact  Back: no CVAT b/l  Skin: No rashes, no nevi      LABS:                        11.6   6.53  )-----------( 194      ( 06 Jun 2022 05:21 )             35.3     Na(136)/K(4.4)/Cl(103)/HCO3(21)/BUN(20)/Cr(1.53)Glu(181)/Ca(8.6)/Mg(1.90)/PO4(2.6)    06-06 @ 05:21  Na(136)/K(4.7)/Cl(106)/HCO3(18)/BUN(22)/Cr(1.53)Glu(116)/Ca(8.6)/Mg(2.10)/PO4(2.7)    06-05 @ 03:15  Na(137)/K(4.2)/Cl(107)/HCO3(20)/BUN(24)/Cr(1.54)Glu(145)/Ca(8.6)/Mg(2.10)/PO4(2.2)    06-04 @ 03:30      IMPRESSION: 88M w/ HTN, DM2, CAD-CABG, and HFpEF, 5/31/22 p/w falls/orthostatic hypotension    (1)Renal - nonproteinuric CKD 3b; resolving mild prerenally-mediated MABLE  (2)CV - orthostatic hypotension/associated syncope on admission. Now improved  (3)EP - potentially for PPM      RECOMMEND:  (1)No objection to Losartan as ordered  (2)Dose new meds for GFR 30-40ml/min   (3)PPM per EP            Davey Chacko MD  [facility]  Office: [phone]  Cell: [phone]

## 2022-06-06 NOTE — PROGRESS NOTE ADULT - SUBJECTIVE AND OBJECTIVE BOX
[facility]rological Care [facility]      Seen earlier today, and examined.  - Today, patient is without complaints.           *****MEDICATIONS: Current medication reviewed and documented.    MEDICATIONS  (STANDING):  aspirin enteric coated 81 milliGRAM(s) Oral daily  atorvastatin 80 milliGRAM(s) Oral at bedtime  BACItracin   Ointment 1 Application(s) Topical two times a day  cholecalciferol 1000 Unit(s) Oral daily  dextrose 5%. 1000 milliLiter(s) (50 mL/Hr) IV Continuous <Continuous>  dextrose 5%. 1000 milliLiter(s) (100 mL/Hr) IV Continuous <Continuous>  dextrose 50% Injectable 25 Gram(s) IV Push once  dextrose 50% Injectable 12.5 Gram(s) IV Push once  dextrose 50% Injectable 25 Gram(s) IV Push once  escitalopram 10 milliGRAM(s) Oral daily  gabapentin 300 milliGRAM(s) Oral two times a day  glucagon  Injectable 1 milliGRAM(s) IntraMuscular once  heparin   Injectable 5000 Unit(s) SubCutaneous every 12 hours  insulin glargine Injectable (LANTUS) 20 Unit(s) SubCutaneous <User Schedule>  insulin lispro (ADMELOG) corrective regimen sliding scale   SubCutaneous three times a day before meals  insulin lispro (ADMELOG) corrective regimen sliding scale   SubCutaneous at bedtime  losartan 50 milliGRAM(s) Oral daily  pantoprazole    Tablet 40 milliGRAM(s) Oral before breakfast  sodium chloride 0.9%. 1000 milliLiter(s) (75 mL/Hr) IV Continuous <Continuous>  sodium chloride 0.9%. 1000 milliLiter(s) (100 mL/Hr) IV Continuous <Continuous>  ticagrelor 90 milliGRAM(s) Oral every 12 hours    MEDICATIONS  (PRN):  acetaminophen     Tablet .. 650 milliGRAM(s) Oral every 6 hours PRN Temp greater or equal to 38C (100.4F), Mild Pain (1 - 3)  dextrose Oral Gel 15 Gram(s) Oral once PRN Blood Glucose LESS THAN 70 milliGRAM(s)/deciliter  melatonin 3 milliGRAM(s) Oral at bedtime PRN Insomnia          ***** VITAL SIGNS:  T(F): 98 (22 @ 10:50), Max: 98.4 (22 @ 21:33)  HR: 87 (22 @ 13:53) (74 - 87)  BP: 124/60 (22 @ 13:53) (124/60 - 171/59)  RR: 18 (22 @ 13:53) (17 - 18)  SpO2: 99% (22 @ 13:53) (97% - 100%)  Wt(kg): --  ,   I&O's Summary    2022 07:01  -  2022 07:00  --------------------------------------------------------  IN: 240 mL / OUT: 900 mL / NET: -660 mL             *****PHYSICAL EXAM:   alert oriented x 3 attention comprehension are fair.  Able to name, repeat.   EOmi fundi not visualized   no nystagmus VFF to confrontation  Tongue is midline     Moving all 4 ext spontaneously no drift appreciated    Gait not assessed.            *****LAB AND IMAGIN.6   6.53  )-----------( 194      ( 2022 05:21 )             35.3               06-06    136  |  103  |  20  ----------------------------<  181<H>  4.4   |  21<L>  |  1.53<H>    Ca    8.6      2022 05:21  Phos  2.6     06-06  Mg     1.90     06-06                           [All pertinent recent Imaging/Reports reviewed]           *****A S S E S S M E N T   A N D   P L A N :       Excerpt from H&P,"     This is a 89 y/o M with pmhx of CAD s/p CABG, stent recently in 2022, HTN, DM2, CKD, PVD with stent presented to the ED for new onset syncopal episodes. The patient was recently diagnosed with CHF exacerbation on the previous admission and was given a stent because of an abnormal stress test. The patient since then had been discharged. 2 days ago, the patient had been having syncopal episodes that last for a few seconds. The patient would be sitting on the chair and would suddenly pass out for a few seconds. He does not remember what occurred before the event. This happened 3 times on . Yesterday the patient was sitting on the stairs outside. Then the son found him on the ground when he stepped away to get a pillow for the patient. No head trauma.    I spoke with the son and his wife is the PCP for the patient. Initially the wife thought it could be orthostatic hypotension but she said the patient denies lightheadedness before the event. She is concerned that due to the recent stent placement, was concerning for arrythmia. The patient denies chest pain or new onset shortness of breath. She also told me that the event usually occurs after he exerts himself and sits down. She also stopped his hydralazine yesterday because he was mildly hypotensive. They endorsed that they checked his BP at home and it was 92 systolic but when he was in the ED it was elevated. Denies hx of seizures     Problem/Recommendations 1:syncope   without any prolonged post ictal period    hypotension vs arrythmia vs valvular pathology   check orthostatics   b12  hx of cervical spine stenosis related clauducation symptoms   cta vs. mra of post circulation r/o vertebobasilar insuff      Problem/Recommendations 2:dizziness unsteady gait   check b12        Thank you for allowing me to participate in the care of this patient. Will continue to follow patient periodically. Please do not hesitate to call me if you have any  questions or if there has been a change in patients neurological status     ________________  Wanda Moss MD  [facility]rological [facility] ([facility]  [phone]      33 minutes spent on total encounter; more than 50 % of the visit was  spent counseling about plan of care, compliance to diet/exercise and medication regimen and or  coordinating care by the attending physician.      It is advised that stroke patients follow up with DENISE Franklin @ [phone] in 1- 2 weeks.   Others please follow up with Dr. Michael Nissenbaum [phone]

## 2022-06-06 NOTE — PROGRESS NOTE ADULT - SUBJECTIVE AND OBJECTIVE BOX
Cordelia Khalil MD  Cardiology Fellow  [phone]  All Cardiology service information can be found 24/7 on [email or web address], password: [email or web address]    Patient seen and examined at bedside.    Overnight Events:   No events overnight, had prolonged discussion with patient, sons, and daughter in law in regards to procedure.     Review Of Systems: No chest pain, shortness of breath, or palpitations            Current Meds:  acetaminophen     Tablet .. 650 milliGRAM(s) Oral every 6 hours PRN  aspirin enteric coated 81 milliGRAM(s) Oral daily  atorvastatin 80 milliGRAM(s) Oral at bedtime  BACItracin   Ointment 1 Application(s) Topical two times a day  cholecalciferol 1000 Unit(s) Oral daily  dextrose 5%. 1000 milliLiter(s) IV Continuous <Continuous>  dextrose 5%. 1000 milliLiter(s) IV Continuous <Continuous>  dextrose 50% Injectable 25 Gram(s) IV Push once  dextrose 50% Injectable 12.5 Gram(s) IV Push once  dextrose 50% Injectable 25 Gram(s) IV Push once  dextrose Oral Gel 15 Gram(s) Oral once PRN  escitalopram 10 milliGRAM(s) Oral daily  gabapentin 300 milliGRAM(s) Oral two times a day  glucagon  Injectable 1 milliGRAM(s) IntraMuscular once  heparin   Injectable 5000 Unit(s) SubCutaneous every 12 hours  insulin glargine Injectable (LANTUS) 20 Unit(s) SubCutaneous <User Schedule>  insulin lispro (ADMELOG) corrective regimen sliding scale   SubCutaneous three times a day before meals  insulin lispro (ADMELOG) corrective regimen sliding scale   SubCutaneous at bedtime  losartan 50 milliGRAM(s) Oral daily  melatonin 3 milliGRAM(s) Oral at bedtime PRN  pantoprazole    Tablet 40 milliGRAM(s) Oral before breakfast  sodium chloride 0.9%. 1000 milliLiter(s) IV Continuous <Continuous>  sodium chloride 0.9%. 1000 milliLiter(s) IV Continuous <Continuous>  ticagrelor 90 milliGRAM(s) Oral every 12 hours      Vitals:  T(F): 98 (06-06), Max: 98.4 (06-05)  HR: 76 (06-06) (74 - 82)  BP: 171/59 (06-06) (136/60 - 171/59)  RR: 18 (06-06)  SpO2: 100% (06-06)  I&O's Summary    05 Jun 2022 07:01  -  06 Jun 2022 07:00  --------------------------------------------------------  IN: 240 mL / OUT: 900 mL / NET: -660 mL        Physical Exam:  Appearance: No acute distress; well appearing  Eyes: PERRL, EOMI, pink conjunctiva  HEENT: Normal oral mucosa  Cardiovascular: RRR, murmur+ no gallops; no edema; no JVD  Respiratory: Clear to auscultation bilaterally  Gastrointestinal: soft, non-tender, non-distended with normal bowel sounds  Musculoskeletal: No clubbing; no joint deformity   Neurologic: Non-focal  Lymphatic: No lymphadenopathy  Psychiatry: AAOx3, mood & affect appropriate  Skin: No rashes, ecchymoses, or cyanosis                          11.6   6.53  )-----------( 194      ( 06 Jun 2022 05:21 )             35.3     06-06    136  |  103  |  20  ----------------------------<  181<H>  4.4   |  21<L>  |  1.53<H>    Ca    8.6      06 Jun 2022 05:21  Phos  2.6     06-06  Mg     1.90     06-06        CARDIAC MARKERS ( 01 Jun 2022 06:12 )  47 ng/L / x     / x     / x     / x     / x      CARDIAC MARKERS ( 31 May 2022 17:50 )  47 ng/L / x     / x     / x     / x     / x          Serum Pro-Brain Natriuretic Peptide: 858 pg/mL (05-31 @ 17:50)          New ECG(s): Personally reviewed    Echo: Personally reviewed  < from: Transthoracic Echocardiogram (05.23.22 @ 08:21) >  Ejection Fraction (Florence Rule): 61 %  ------------------------------------------------------------------------  OBSERVATIONS:  Mitral Valve: Mitral annular calcification, otherwise  normal mitral valve. Mild-moderate mitral regurgitation.  Aortic Root: Normal aortic root.  Aortic Valve: Aortic valve leaflet morphology not well  visualized. Peak transaortic valve gradient equals 18 mm  Hg, mean transaortic valve gradient equals 10 mm Hg,  consistent with mild aortic stenosis. Minimal aortic  regurgitation.  Left Atrium: Mildly dilated left atrium.  LA volume index =  41 cc/m2.  Left Ventricle: Endocardium not well visualized; grossly  normal left ventricular systolic function. Normal left  ventricular internal dimensions and wall thicknesses. Mild  diastolic dysfunction (Stage I).  Right Heart: Normal right atrium. The right ventricle is  not well visualized; grossly normal right ventricular  systolic function. Normal tricuspid valve.  Minimal  tricuspid regurgitation. Normal pulmonic valve. Minimal  pulmonic regurgitation.  Pericardium/PleuraNormal pericardium withno pericardial  effusion.  ------------------------------------------------------------------------  CONCLUSIONS:  1. Mitral annular calcification, otherwise normal mitral  valve. Mild-moderate mitral regurgitation.  2. Aortic valve leaflet morphology not well visualized.  Peak transaortic valve gradient equals 18 mm Hg, mean  transaortic valve gradient equals 10 mm Hg, consistent with  mild aortic stenosis. Minimal aortic regurgitation.  3. Mildly dilated left atrium.  LA volume index = 41 cc/m2.  4. Normal left ventricular internal dimensions and wall  thicknesses.  5. Endocardium not well visualized; grossly normal left  ventricular systolic function.  6. Mild diastolic dysfunction (Stage I).  7. The right ventricle is not well visualized;grossly  normal right ventricular systolic function.  ------------------------------------------------------------------------  Confirmed on  5/23/2022 - 10:41:31 by Sai Blake M.D.,  [facility], [place]  ------------------------------------------------------------------------    < end of copied text >    Stress Testing:   < from: Nuclear Stress Test-Pharmacologic (Nuclear Stress Test-Pharmacologic .) (05.24.22 @ 14:44) >  IMPRESSIONS:Abnormal Study  * Myocardial Perfusion SPECT results are abnormal.  * There are large, severe defects in inferior and  inferolateral walls that is predominantly reversible,  consistent with infarct with significant mateo-infarct  ischemia.There is a medium sized, moderate defect in  anterior wall that is reversible, consistent with  ischemia.  * Post-stress gated wall motion analysis was performed  (LVEF = 32 %;LVEDV = 90 ml.), revealing severe hypokinesis  in the inferior and inferolateral walls.  *** No previous Nuclear/Stress exam.  ------------------------------------------------------------------------  Confirmed on  5/25/2022 - 14:25:35 by Zenai Rubin MD  ------------------------------------------------------------------------    < end of copied text >      Cath:   < from: Cardiac Catheterization (05.26.22 @ 17:08) >  Conclusions:   Significant restenosis in proximal segment of SVG to OM1 (prior stent  and recent POBA for restenosis). Patent LIMA to mid  LAD. RCA  with collaterals from LAD septals.     Successful PCI with GEMMA of proximal SVG to OM1 lesion (in-stent  restenosis).    Recommendations:   Continue dual antiplatelet therapy for 6 months (aspirin and  ticagrelor).    Acute complication:    No complications     < end of copied text >      Interpretation of Telemetry:  NSR

## 2022-06-06 NOTE — PROGRESS NOTE ADULT - ASSESSMENT
89 y/o M with pmhx of CAD s/p CABG, stent recently in 5/2022, HTN, DM2, CKD, PVD with stent presented to the ED for new onset syncopal episodes. Admit for syncope work up needing telemetry.     Problem/Plan - 1:  ·  Problem: Syncope.   ·  Plan: Assessment:  - patient has multiple episodes of syncope at home  - unclear etiology: no signs of lightheadedness before the episodes which could suspect orthostatic hypotension. However the patient is on multiple BP medications  - no hx of seizures  - no chest pain, Troponins neg x1, EKG shows RBBB and 1st degree AV block which is similar to previous EKG  - Echo from 5/2022 - normal EF, mild aortic stenosis  - orthostatic positive. Resolved.  - hold BP meds  - does not need a repeat echo as it was done 2 weeks ago  - cardiology consulted and recommending EP consult  .  - Neurology help appreciated. MRI noted.   - EEG normal .    -EP consult noted and planning PPM. So NPO from midnight . Neurologist Son wants to talk to EP before procedure today .  D/W EP attending and will D/W Son .      Problem/Plan - 2:  ·  Problem: Fall.   ·  Plan: Assessment:  - patient fell on concrete while sitting on the stairs  - No head trauma, but has skin scrapes on foot and elbow    Plan:  - PT eval  - evaluate for etiology for fall as above.     Problem/Plan - 3:  ·  Problem: Benign essential HTN.   ·  Plan: - BP high so started AB,      Problem/Plan - 4:  ·  Problem: DM2 (diabetes mellitus, type 2).   ·  Plan: - 10U lantus QHs.     Problem/Plan - 5:  ·  Problem: CAD (coronary artery disease).   ·  Plan: - c/w DAPT.     Problem/Plan - 6:  ·  Problem: Stage 3 chronic kidney disease.   ·  Plan: - monitor for now.     Problem/Plan - 7:  ·  Problem: Prophylactic measure.   ·  Plan: - heparin subq for dvt ppx.    Dispo : DC planning pending above.     D/W Son in room.

## 2022-06-06 NOTE — PROGRESS NOTE ADULT - ASSESSMENT
87 y/o M with pmhx of CAD s/p CABG, stent recently in 5/2022, HTN, DM2, CKD, PVD with stent presented to the ED for new onset syncopal episodes.    Plan  -orthostatics positive however overall presentation concerning for cardiac syncope in the setting of apparent conduction disease as evidenced by ECG  -c/w telemetry monitoring  -daily orthostatics  -hold AVN blockers  -c/w DAPT in the setting of recent PCI  -recommend PPM implantation; how prolonged discussion with multiple family members this AM however did receive heparin at 6:38 a.m. will d/w EP interventionalist in regards to timing of procedure.   -Keep K>4, Mg >2. Please give 1 mg Magnesium IV

## 2022-06-06 NOTE — PROGRESS NOTE ADULT - SUBJECTIVE AND OBJECTIVE BOX
Date of Service  : 06-06-22 @ 10:26    INTERVAL HPI/OVERNIGHT EVENTS: No new concerns.   Vital Signs Last 24 Hrs  T(C): 36.7 (06 Jun 2022 06:00), Max: 36.9 (05 Jun 2022 21:33)  T(F): 98.1 (06 Jun 2022 06:00), Max: 98.4 (05 Jun 2022 21:33)  HR: 82 (06 Jun 2022 09:58) (74 - 82)  BP: 160/85 (06 Jun 2022 09:58) (136/60 - 170/62)  BP(mean): --  RR: 18 (06 Jun 2022 09:58) (17 - 18)  SpO2: 100% (06 Jun 2022 09:58) (98% - 100%)  I&O's Summary    05 Jun 2022 07:01  -  06 Jun 2022 07:00  --------------------------------------------------------  IN: 240 mL / OUT: 900 mL / NET: -660 mL      MEDICATIONS  (STANDING):  aspirin enteric coated 81 milliGRAM(s) Oral daily  atorvastatin 80 milliGRAM(s) Oral at bedtime  BACItracin   Ointment 1 Application(s) Topical two times a day  cholecalciferol 1000 Unit(s) Oral daily  dextrose 5%. 1000 milliLiter(s) (50 mL/Hr) IV Continuous <Continuous>  dextrose 5%. 1000 milliLiter(s) (100 mL/Hr) IV Continuous <Continuous>  dextrose 50% Injectable 25 Gram(s) IV Push once  dextrose 50% Injectable 12.5 Gram(s) IV Push once  dextrose 50% Injectable 25 Gram(s) IV Push once  escitalopram 10 milliGRAM(s) Oral daily  gabapentin 300 milliGRAM(s) Oral two times a day  glucagon  Injectable 1 milliGRAM(s) IntraMuscular once  heparin   Injectable 5000 Unit(s) SubCutaneous every 12 hours  insulin glargine Injectable (LANTUS) 20 Unit(s) SubCutaneous <User Schedule>  insulin lispro (ADMELOG) corrective regimen sliding scale   SubCutaneous three times a day before meals  insulin lispro (ADMELOG) corrective regimen sliding scale   SubCutaneous at bedtime  losartan 50 milliGRAM(s) Oral daily  pantoprazole    Tablet 40 milliGRAM(s) Oral before breakfast  sodium chloride 0.9%. 1000 milliLiter(s) (75 mL/Hr) IV Continuous <Continuous>  sodium chloride 0.9%. 1000 milliLiter(s) (100 mL/Hr) IV Continuous <Continuous>  ticagrelor 90 milliGRAM(s) Oral every 12 hours    MEDICATIONS  (PRN):  acetaminophen     Tablet .. 650 milliGRAM(s) Oral every 6 hours PRN Temp greater or equal to 38C (100.4F), Mild Pain (1 - 3)  dextrose Oral Gel 15 Gram(s) Oral once PRN Blood Glucose LESS THAN 70 milliGRAM(s)/deciliter  melatonin 3 milliGRAM(s) Oral at bedtime PRN Insomnia    LABS:                        11.6   6.53  )-----------( 194      ( 06 Jun 2022 05:21 )             35.3     06-06    136  |  103  |  20  ----------------------------<  181<H>  4.4   |  21<L>  |  1.53<H>    Ca    8.6      06 Jun 2022 05:21  Phos  2.6     06-06  Mg     1.90     06-06          CAPILLARY BLOOD GLUCOSE      POCT Blood Glucose.: 161 mg/dL (06 Jun 2022 06:13)          REVIEW OF SYSTEMS:  CONSTITUTIONAL: No fever, weight loss, or fatigue  EYES: No eye pain, visual disturbances, or discharge  ENMT:  No difficulty hearing, tinnitus, vertigo; No sinus or throat pain  NECK: No pain or stiffness  RESPIRATORY: No cough, wheezing, chills or hemoptysis; No shortness of breath  CARDIOVASCULAR: No chest pain, palpitations, dizziness, or leg swelling  GASTROINTESTINAL: No abdominal or epigastric pain. No nausea, vomiting, or hematemesis; No diarrhea or constipation. No melena or hematochezia.  GENITOURINARY: No dysuria, frequency, hematuria, or incontinence  NEUROLOGICAL: No headaches, memory loss, loss of strength, numbness, or tremors      Consultant(s) Notes Reviewed:  [x ] YES  [ ] NO    PHYSICAL EXAM:  GENERAL: NAD, well-groomed, well-developed,not in any distress ,  HEAD:  Atraumatic, Normocephalic  EYES: EOMI, PERRLA, conjunctiva and sclera clear  ENMT: No tonsillar erythema, exudates, or enlargement; Moist mucous membranes, Good dentition, No lesions  NECK: Supple, No JVD, Normal thyroid  NERVOUS SYSTEM:  Alert & Oriented X3, No focal deficit   CHEST/LUNG: Good air entry bilateral with no  rales, rhonchi, wheezing, or rubs  HEART: Regular rate and rhythm; No murmurs, rubs, or gallops  ABDOMEN: Soft, Nontender, Nondistended; Bowel sounds present  EXTREMITIES:  2+ Peripheral Pulses, No clubbing, cyanosis, or edema    Care Discussed with Consultants/Other Providers [ x] YES  [ ] NO

## 2022-06-07 ENCOUNTER — TRANSCRIPTION ENCOUNTER (OUTPATIENT)
Age: 87
End: 2022-06-07

## 2022-06-07 LAB
ANION GAP SERPL CALC-SCNC: 10 MMOL/L — SIGNIFICANT CHANGE UP (ref 7–14)
BUN SERPL-MCNC: 23 MG/DL — SIGNIFICANT CHANGE UP (ref 7–23)
CALCIUM SERPL-MCNC: 8.8 MG/DL — SIGNIFICANT CHANGE UP (ref 8.4–10.5)
CHLORIDE SERPL-SCNC: 103 MMOL/L — SIGNIFICANT CHANGE UP (ref 98–107)
CO2 SERPL-SCNC: 20 MMOL/L — LOW (ref 22–31)
CREAT SERPL-MCNC: 1.57 MG/DL — HIGH (ref 0.5–1.3)
EGFR: 42 ML/MIN/1.73M2 — LOW
GLUCOSE BLDC GLUCOMTR-MCNC: 213 MG/DL — HIGH (ref 70–99)
GLUCOSE BLDC GLUCOMTR-MCNC: 278 MG/DL — HIGH (ref 70–99)
GLUCOSE SERPL-MCNC: 175 MG/DL — HIGH (ref 70–99)
HCT VFR BLD CALC: 39.4 % — SIGNIFICANT CHANGE UP (ref 39–50)
HGB BLD-MCNC: 12.6 G/DL — LOW (ref 13–17)
MAGNESIUM SERPL-MCNC: 1.9 MG/DL — SIGNIFICANT CHANGE UP (ref 1.6–2.6)
MCHC RBC-ENTMCNC: 29.6 PG — SIGNIFICANT CHANGE UP (ref 27–34)
MCHC RBC-ENTMCNC: 32 GM/DL — SIGNIFICANT CHANGE UP (ref 32–36)
MCV RBC AUTO: 92.5 FL — SIGNIFICANT CHANGE UP (ref 80–100)
NRBC # BLD: 0 /100 WBCS — SIGNIFICANT CHANGE UP
NRBC # FLD: 0 K/UL — SIGNIFICANT CHANGE UP
PHOSPHATE SERPL-MCNC: 2.1 MG/DL — LOW (ref 2.5–4.5)
PLATELET # BLD AUTO: 210 K/UL — SIGNIFICANT CHANGE UP (ref 150–400)
POTASSIUM SERPL-MCNC: 4.6 MMOL/L — SIGNIFICANT CHANGE UP (ref 3.5–5.3)
POTASSIUM SERPL-SCNC: 4.6 MMOL/L — SIGNIFICANT CHANGE UP (ref 3.5–5.3)
RBC # BLD: 4.26 M/UL — SIGNIFICANT CHANGE UP (ref 4.2–5.8)
RBC # FLD: 13.3 % — SIGNIFICANT CHANGE UP (ref 10.3–14.5)
SODIUM SERPL-SCNC: 133 MMOL/L — LOW (ref 135–145)
WBC # BLD: 13.24 K/UL — HIGH (ref 3.8–10.5)
WBC # FLD AUTO: 13.24 K/UL — HIGH (ref 3.8–10.5)

## 2022-06-07 PROCEDURE — 99232 SBSQ HOSP IP/OBS MODERATE 35: CPT

## 2022-06-07 RX ORDER — ACETAMINOPHEN 500 MG
325 TABLET ORAL ONCE
Refills: 0 | Status: COMPLETED | OUTPATIENT
Start: 2022-06-07 | End: 2022-06-07

## 2022-06-07 RX ORDER — LOSARTAN POTASSIUM 100 MG/1
25 TABLET, FILM COATED ORAL DAILY
Refills: 0 | Status: DISCONTINUED | OUTPATIENT
Start: 2022-06-07 | End: 2022-06-08

## 2022-06-07 RX ADMIN — Medication 100 MILLIGRAM(S): at 05:26

## 2022-06-07 RX ADMIN — GABAPENTIN 300 MILLIGRAM(S): 400 CAPSULE ORAL at 05:19

## 2022-06-07 RX ADMIN — Medication 650 MILLIGRAM(S): at 18:31

## 2022-06-07 RX ADMIN — ESCITALOPRAM OXALATE 10 MILLIGRAM(S): 10 TABLET, FILM COATED ORAL at 11:54

## 2022-06-07 RX ADMIN — Medication 650 MILLIGRAM(S): at 09:13

## 2022-06-07 RX ADMIN — Medication 650 MILLIGRAM(S): at 10:15

## 2022-06-07 RX ADMIN — Medication 3: at 18:21

## 2022-06-07 RX ADMIN — Medication 325 MILLIGRAM(S): at 10:15

## 2022-06-07 RX ADMIN — Medication 650 MILLIGRAM(S): at 19:00

## 2022-06-07 RX ADMIN — HEPARIN SODIUM 5000 UNIT(S): 5000 INJECTION INTRAVENOUS; SUBCUTANEOUS at 18:17

## 2022-06-07 RX ADMIN — HEPARIN SODIUM 5000 UNIT(S): 5000 INJECTION INTRAVENOUS; SUBCUTANEOUS at 05:19

## 2022-06-07 RX ADMIN — TICAGRELOR 90 MILLIGRAM(S): 90 TABLET ORAL at 05:20

## 2022-06-07 RX ADMIN — LOSARTAN POTASSIUM 50 MILLIGRAM(S): 100 TABLET, FILM COATED ORAL at 05:20

## 2022-06-07 RX ADMIN — Medication 81 MILLIGRAM(S): at 11:54

## 2022-06-07 RX ADMIN — PANTOPRAZOLE SODIUM 40 MILLIGRAM(S): 20 TABLET, DELAYED RELEASE ORAL at 05:20

## 2022-06-07 RX ADMIN — LOSARTAN POTASSIUM 25 MILLIGRAM(S): 100 TABLET, FILM COATED ORAL at 21:48

## 2022-06-07 RX ADMIN — GABAPENTIN 300 MILLIGRAM(S): 400 CAPSULE ORAL at 18:17

## 2022-06-07 RX ADMIN — ATORVASTATIN CALCIUM 80 MILLIGRAM(S): 80 TABLET, FILM COATED ORAL at 21:45

## 2022-06-07 RX ADMIN — TICAGRELOR 90 MILLIGRAM(S): 90 TABLET ORAL at 18:16

## 2022-06-07 RX ADMIN — Medication 1000 UNIT(S): at 11:54

## 2022-06-07 RX ADMIN — Medication 325 MILLIGRAM(S): at 09:10

## 2022-06-07 NOTE — PROGRESS NOTE ADULT - SUBJECTIVE AND OBJECTIVE BOX
Patient is seen and examined. He denies nay chest pain, SOB, palpitations or dizziness. S/P PPM placement yesterday. He has c/o soreness @ the PPM site    PAST MEDICAL & SURGICAL HISTORY:  Hyperlipemia    Hypertension    Coronary Artery Disease    Diabetes Mellitus Type II    Stented Coronary Artery  total 5 stents, last stent 5/2019    Neuropathy    Myocardial infarction    Stroke  mild left facial numbness   no other residuals verbalized    History of Cataract Extraction    Hx of CABG    H/O coronary angiogram    S/P coronary artery stent placement  1/6/09        MEDICATIONS  (STANDING):  aspirin enteric coated 81 milliGRAM(s) Oral daily  atorvastatin 80 milliGRAM(s) Oral at bedtime  BACItracin   Ointment 1 Application(s) Topical two times a day  cholecalciferol 1000 Unit(s) Oral daily  dextrose 5%. 1000 milliLiter(s) (50 mL/Hr) IV Continuous <Continuous>  dextrose 5%. 1000 milliLiter(s) (100 mL/Hr) IV Continuous <Continuous>  dextrose 50% Injectable 25 Gram(s) IV Push once  dextrose 50% Injectable 12.5 Gram(s) IV Push once  dextrose 50% Injectable 25 Gram(s) IV Push once  escitalopram 10 milliGRAM(s) Oral daily  gabapentin 300 milliGRAM(s) Oral two times a day  glucagon  Injectable 1 milliGRAM(s) IntraMuscular once  heparin   Injectable 5000 Unit(s) SubCutaneous every 12 hours  insulin glargine Injectable (LANTUS) 20 Unit(s) SubCutaneous <User Schedule>  insulin lispro (ADMELOG) corrective regimen sliding scale   SubCutaneous three times a day before meals  insulin lispro (ADMELOG) corrective regimen sliding scale   SubCutaneous at bedtime  losartan 50 milliGRAM(s) Oral daily  pantoprazole    Tablet 40 milliGRAM(s) Oral before breakfast  sodium chloride 0.9%. 1000 milliLiter(s) (75 mL/Hr) IV Continuous <Continuous>  sodium chloride 0.9%. 1000 milliLiter(s) (100 mL/Hr) IV Continuous <Continuous>  ticagrelor 90 milliGRAM(s) Oral every 12 hours    MEDICATIONS  (PRN):  acetaminophen     Tablet .. 650 milliGRAM(s) Oral every 6 hours PRN Temp greater or equal to 38C (100.4F), Mild Pain (1 - 3)  dextrose Oral Gel 15 Gram(s) Oral once PRN Blood Glucose LESS THAN 70 milliGRAM(s)/deciliter  melatonin 3 milliGRAM(s) Oral at bedtime PRN Insomnia      Vital Signs Last 24 Hrs  T(C): 36.7 (07 Jun 2022 05:22), Max: 36.7 (06 Jun 2022 10:50)  T(F): 98 (07 Jun 2022 05:22), Max: 98 (06 Jun 2022 10:50)  HR: 85 (07 Jun 2022 05:22) (76 - 87)  BP: 136/60 (07 Jun 2022 05:22) (124/60 - 171/59)  BP(mean): --  RR: 17 (07 Jun 2022 05:22) (17 - 18)  SpO2: 100% (07 Jun 2022 05:22) (97% - 100%)    INTERPRETATION OF TELEMETRY: Sinus rhythm with HR 60s-90s; Occ. PVCs and occ. V pacing    LABS:                        12.6   13.24 )-----------( 210      ( 07 Jun 2022 03:10 )             39.4     06-07    133<L>  |  103  |  23  ----------------------------<  175<H>  4.6   |  20<L>  |  1.57<H>    Ca    8.8      07 Jun 2022 03:10  Phos  2.1     06-07  Mg     1.90     06-07              I&O's Summary    BNP  RADIOLOGY & ADDITIONAL STUDIES:      PHYSICAL EXAM:    GENERAL: In no apparent distress, well nourished, and hydrated.  HEAD:  Atraumatic, Normocephalic  EYES: EOMI, PERRLA, conjunctiva and sclera clear  ENMT: No tonsillar erythema, exudates, or enlargement; Moist mucous membranes, Good dentition, No lesions  NECK: Supple and normal thyroid.  No JVD or carotid bruit.  Carotid pulse is 2+ bilaterally.  HEART: Regular rate and rhythm; No murmurs, rubs, or gallops.  PULMONARY: Clear to auscultation and percussion.  No rales, wheezing, or rhonchi bilaterally.  ABDOMEN: Soft, Nontender, Nondistended; Bowel sounds present  EXTREMITIES:  2+ Peripheral Pulses, No clubbing, cyanosis, or edema  LYMPH: No lymphadenopathy noted  NEUROLOGICAL: Grossly nonfocal

## 2022-06-07 NOTE — DISCHARGE NOTE PROVIDER - NSDCCPCAREPLAN_GEN_ALL_CORE_FT
PRINCIPAL DISCHARGE DIAGNOSIS  Diagnosis: Syncope  Assessment and Plan of Treatment: You underwent a work up for syncope  You were evaluated by the cardiology team  You underwent placement of a pacemaker during your stay  You are scheduled for an appointment on 6/22/22 @ 11:40am  No scrubbing the incision site  No lotion, ointment, powder or direct sunlight to the incision site for 2 weeks   No lifting more than 5lb or strenuous activity such as golfing, tennis or swimming for 6-8 weeks  No swimming pool, Jacuzzi for 6-8 weeks.   You should carry your ID card for metal detectors   Remove bandage after 24 hours  Patient can shower 24 hours after procedure. Pat the area dry  Keep Cellular phone 6 inches away  from the device  Patient was instructed to call [phone] if the following occurs:      - fever with temperature > 101F      - swelling, drainage or bleeding at the site incision        SECONDARY DISCHARGE DIAGNOSES  Diagnosis: DM2 (diabetes mellitus, type 2)  Assessment and Plan of Treatment: Continue consistent carbohydrate diet.  Monitor blood glucose levels throughout the day before meals and at bedtime.  Record blood sugars and bring to outpatient providers appointment in order to be reviewed by your doctor for management modifications.  Be aware of diabetes management symptoms including feeling cool and clammy may be related to low glucose levels.  Feeling hot and dry may indicate high glucose levels.  If  you feel these symptoms, check your blood sugar.  Make regular podiatry appointments in order to have feet checked for wounds and toe nails cut by a doctor to prevent infections.      Diagnosis: Benign essential HTN  Assessment and Plan of Treatment: Continue blood pressure medication regimen as directed. Monitor for any visual changes, headaches or dizziness.  Monitor blood pressure regularly.  Follow up with your PCP for further management for high blood pressure.

## 2022-06-07 NOTE — PROGRESS NOTE ADULT - ASSESSMENT
87 y/o M with pmhx of CAD s/p CABG, stent recently in 5/2022, HTN, DM2, CKD, PVD with stent presented to the ED for new onset syncopal episodes. Admit for syncope work up needing telemetry.     Problem/Plan - 1:  ·  Problem: Syncope.   ·  Plan: Assessment:  - patient has multiple episodes of syncope at home  - unclear etiology: no signs of lightheadedness before the episodes which could suspect orthostatic hypotension. However the patient is on multiple BP medications  - no hx of seizures  - no chest pain, Troponins neg x1, EKG shows RBBB and 1st degree AV block which is similar to previous EKG  - Echo from 5/2022 - normal EF, mild aortic stenosis  - orthostatic positive. Resolved.  - hold BP meds  - does not need a repeat echo as it was done 2 weeks ago  - cardiology consulted and recommending EP consult  .  - Neurology help appreciated. MRI noted.   - EEG normal .    -EP help appreciated . S/P PPM.      Problem/Plan - 2:  ·  Problem: Fall.   ·  Plan: Assessment:  - patient fell on concrete while sitting on the stairs  - No head trauma, but has skin scrapes on foot and elbow    Plan:  - PT eval  - evaluate for etiology for fall as above.     Problem/Plan - 3:  ·  Problem: Benign essential HTN.   ·  Plan: - BP low so ARB with hold parameters.       Problem/Plan - 4:  ·  Problem: DM2 (diabetes mellitus, type 2).   ·  Plan: - 10U lantus QHs.     Problem/Plan - 5:  ·  Problem: CAD (coronary artery disease).   ·  Plan: - c/w DAPT.     Problem/Plan - 6:  ·  Problem: Stage 3 chronic kidney disease.   ·  Plan: - monitor for now.     Problem/Plan - 7:  ·  Problem: Prophylactic measure.   ·  Plan: - heparin subq for dvt ppx.    Dispo : DC planning home tomorrow as refusing [facility] .      D/W Son in room.

## 2022-06-07 NOTE — PROGRESS NOTE ADULT - ASSESSMENT
87 y/o M with pmhx of CAD s/p CABG, stent recently in 5/2022, HTN, DM2, CKD, PVD with stent presented to the ED for new onset syncopal episodes. Sheldon dto have bifascicular block o EKG. He is s/p PPM placement yesterday for cardiac syncope in the setting of apparent conduction disease as evidenced by ECG. Awaiting device interrogation and CXR report  PPM site with dressing. It was clean, dry, and intact. No bleeding, drainage hematoma, or ecchymosis appreciated.  Post-op PPM instructions have been verbally explained and given to the patient.  Patient expressed understanding and all questions were answered. A copy of the instruction is located in the patients chart   Patient is schedule for an appointment on 6/22/22 @ 11:40am  No scrubbing the incision site  No lotion, ointment, powder or direct sunlight to the incision site for 2 weeks   No lifting more than 5lb or strenuous activity such as golfing, tennis or swimming for 6-8 weeks  No swimming pool, [place] for 6-8 weeks.   You should carry your ID card for metal detectors   Remove bandage after 24 hours  Patient can shower 24 hours after procedure. Pat the area dry  Keep Cellular phone 6 inches away  from the device  Patient was instructed to call [phone] if the following occurs:      - fever with temperature > 101F      - swelling, drainage or bleeding at the site incision  - c/w telemetry monitoring while in patient  - daily orthostatics  - may resume AVN blockers as needed  -c/w DAPT in the setting of recent PCI  -Keep K>4, Mg >2.   - May d/c home from EP standpoint if device interrogation and CXR normal

## 2022-06-07 NOTE — PROGRESS NOTE ADULT - SUBJECTIVE AND OBJECTIVE BOX
Overnight events noted      VITAL:  T(C): , Max: 36.7 (06-07-22 @ 05:22)  T(F): , Max: 98.1 (06-07-22 @ 10:47)  HR: 82 (06-07-22 @ 10:47)  BP: 108/46 (06-07-22 @ 10:47)  BP(mean): --  RR: 18 (06-07-22 @ 10:47)  SpO2: 96% (06-07-22 @ 10:47)      PHYSICAL EXAM:  Constitutional: Frail; thin to cachectic  HEENT: DMM  Neck: Supple, No JVD  Respiratory: CTA-b/l  Cardiovascular: distant s1s2, (+)2/6 TODD  Gastrointestinal: BS+, soft, NT/ND  Extremities: No peripheral edema b/l  Neurological: no focal deficits; strength grossly intact  Back: no CVAT b/l  Skin: No rashes, no nevi    LABS:                        12.6   13.24 )-----------( 210      ( 07 Jun 2022 03:10 )             39.4     Na(133)/K(4.6)/Cl(103)/HCO3(20)/BUN(23)/Cr(1.57)Glu(175)/Ca(8.8)/Mg(1.90)/PO4(2.1)    06-07 @ 03:10  Na(136)/K(4.4)/Cl(103)/HCO3(21)/BUN(20)/Cr(1.53)Glu(181)/Ca(8.6)/Mg(1.90)/PO4(2.6)    06-06 @ 05:21  Na(136)/K(4.7)/Cl(106)/HCO3(18)/BUN(22)/Cr(1.53)Glu(116)/Ca(8.6)/Mg(2.10)/PO4(2.7)    06-05 @ 03:15      IMPRESSION: 88M w/ HTN, DM2, CAD-CABG, and HFpEF, 5/31/22 p/w falls/orthostatic hypotension    (1)Renal - nonproteinuric CKD 3b; resolved mild prerenally-mediated MABLE  (2)CV - orthostatic hypotension/associated syncope on admission. Now improved  (3)EP - s/p PPM placement 6/6/22      RECOMMEND:  (1)Meds as ordered  (2)D/C planning per primary team; can f/u at our office on a prn basis        Davey Chacko MD  [facility]  Office: [phone]  Cell: [phone]       Son at bedside; shares that BP running soft today (110/30 this a.m.)      VITAL:  T(C): , Max: 36.7 (06-07-22 @ 05:22)  T(F): , Max: 98.1 (06-07-22 @ 10:47)  HR: 82 (06-07-22 @ 10:47)  BP: 108/46 (06-07-22 @ 10:47)  BP(mean): --  RR: 18 (06-07-22 @ 10:47)  SpO2: 96% (06-07-22 @ 10:47)      PHYSICAL EXAM:  Constitutional: Frail; thin to cachectic  HEENT: DMM  Neck: Supple, No JVD  Respiratory: CTA-b/l  Cardiovascular: distant s1s2, (+)2/6 TODD  Gastrointestinal: BS+, soft, NT/ND  Extremities: No peripheral edema b/l  Neurological: no focal deficits; strength grossly intact  Back: no CVAT b/l  Skin: No rashes, no nevi    LABS:                        12.6   13.24 )-----------( 210      ( 07 Jun 2022 03:10 )             39.4     Na(133)/K(4.6)/Cl(103)/HCO3(20)/BUN(23)/Cr(1.57)Glu(175)/Ca(8.8)/Mg(1.90)/PO4(2.1)    06-07 @ 03:10  Na(136)/K(4.4)/Cl(103)/HCO3(21)/BUN(20)/Cr(1.53)Glu(181)/Ca(8.6)/Mg(1.90)/PO4(2.6)    06-06 @ 05:21  Na(136)/K(4.7)/Cl(106)/HCO3(18)/BUN(22)/Cr(1.53)Glu(116)/Ca(8.6)/Mg(2.10)/PO4(2.7)    06-05 @ 03:15      IMPRESSION: 88M w/ HTN, DM2, CAD-CABG, and HFpEF, 5/31/22 p/w falls/orthostatic hypotension    (1)Renal - nonproteinuric CKD 3b; resolved mild prerenally-mediated MABLE  (2)CV - orthostatic hypotension/associated syncope on admission. Now improved. That being said BP is a bit low. We can cut down on the Losartan dose.  (3)EP - s/p PPM placement 6/6/22      RECOMMEND:  (1)Reduce Losartan from 50qd to 25qd  (2)D/C planning per primary team; can f/u at our office on a prn basis        Davey Chacko MD  [facility]  Office: [phone]  Cell: [phone]

## 2022-06-07 NOTE — DISCHARGE NOTE PROVIDER - NSDCFUSCHEDAPPT_GEN_ALL_CORE_FT
[facility] [phone]  Scheduled Appointment: 06/22/2022

## 2022-06-07 NOTE — DISCHARGE NOTE PROVIDER - NSDCMRMEDTOKEN_GEN_ALL_CORE_FT
acetaminophen 325 mg oral tablet: 2 tab(s) orally every 6 hours, As needed, Mild Pain (1 - 3), Moderate Pain (4 - 6)  aspirin 81 mg oral delayed release tablet: 1 tab(s) orally once a day  Basaglar KwikPen 100 units/mL subcutaneous solution: 20 unit(s) subcutaneous once a day (at bedtime)  carvedilol 6.25 mg oral tablet: 1 tab(s) orally every 12 hours  Crestor 40 mg oral tablet: 1 tab(s) orally once a day  Dexilant 60 mg oral delayed release capsule: 1 cap(s) orally once a day  furosemide 20 mg oral tablet: 1 tab(s) orally once a day  gabapentin 100 mg oral capsule: 4 cap(s) orally 2 times a day  isosorbide dinitrate 10 mg oral tablet: 1 tab(s) orally 3 times a day  Jardiance 25 mg oral tablet: 1 tab(s) orally once a day (in the morning)  Lexapro 10 mg oral tablet: 1 tab(s) orally once a day  Linzess 145 mcg oral capsule: 1 cap(s) orally once a day  Micardis 20 mg oral tablet: 1 tab(s) orally once a day  polyethylene glycol 3350 oral powder for reconstitution: 17 gram(s) orally once a day  ticagrelor 90 mg oral tablet: 1 tab(s) orally every 12 hours  ticagrelor 90 mg oral tablet: 1 tab(s) orally every 12 hours;    price check; [id number]  Tradjenta 5 mg oral tablet: 1 tab(s) orally once a day  Vitamin D3 25 mcg (1000 intl units) oral capsule: 1 cap(s) orally once a day   acetaminophen 325 mg oral tablet: 2 tab(s) orally every 6 hours, As needed, Mild Pain (1 - 3), Moderate Pain (4 - 6)  aspirin 81 mg oral delayed release tablet: 1 tab(s) orally once a day  Basaglar KwikPen 100 units/mL subcutaneous solution: 20 unit(s) subcutaneous once a day    carvedilol 3.125 mg oral tablet: 1 tab(s) orally 2 times a day   Crestor 40 mg oral tablet: 1 tab(s) orally once a day  Dexilant 60 mg oral delayed release capsule: 1 cap(s) orally once a day  gabapentin 100 mg oral capsule: 1 cap(s) orally 2 times a day   Jardiance 25 mg oral tablet: 1 tab(s) orally once a day (in the morning)  Lexapro 10 mg oral tablet: 1 tab(s) orally once a day  Linzess 145 mcg oral capsule: 1 cap(s) orally once a day  losartan 25 mg oral tablet: 1 tab(s) orally once a day  polyethylene glycol 3350 oral powder for reconstitution: 17 gram(s) orally once a day  ticagrelor 90 mg oral tablet: 1 tab(s) orally every 12 hours  Tradjenta 5 mg oral tablet: 1 tab(s) orally once a day  Vitamin D3 25 mcg (1000 intl units) oral capsule: 1 cap(s) orally once a day

## 2022-06-07 NOTE — DISCHARGE NOTE PROVIDER - HOSPITAL COURSE
89 y/o M with pmhx of CAD s/p CABG, stent recently in 5/2022, HTN, DM2, CKD, PVD with stent presented to the ED for new onset syncopal episodes. Admit for syncope work up needing telemetry.     Problem/Plan - 1:  ·  Problem: Syncope.   ·  Plan: Assessment:  - patient has multiple episodes of syncope at home  - unclear etiology: no signs of lightheadedness before the episodes which could suspect orthostatic hypotension. However the patient is on multiple BP medications  - no hx of seizures  - no chest pain, Troponins neg x1, EKG shows RBBB and 1st degree AV block which is similar to previous EKG  - Echo from 5/2022 - normal EF, mild aortic stenosis  - orthostatic positive. Resolved.  - hold BP meds  - does not need a repeat echo as it was done 2 weeks ago  - cardiology consulted and recommending EP consult  .  - Neurology help appreciated. MRI noted.   - EEG normal .    -EP help appreciated . S/P PPM.      Problem/Plan - 2:  ·  Problem: Fall.   ·  Plan: Assessment:  - patient fell on concrete while sitting on the stairs  - No head trauma, but has skin scrapes on foot and elbow    Plan:  - PT eval - recommending earnest - refusing - home  - evaluate for etiology for fall as above.     Problem/Plan - 3:  ·  Problem: Benign essential HTN.   ·  Plan: - BP low so ARB with hold parameters.       Problem/Plan - 4:  ·  Problem: DM2 (diabetes mellitus, type 2).   ·  Plan: - 10U lantus QHs.     Problem/Plan - 5:  ·  Problem: CAD (coronary artery disease).   ·  Plan: - c/w DAPT.     Problem/Plan - 6:  ·  Problem: Stage 3 chronic kidney disease.   ·  Plan: - monitor for now.   87 y/o M with pmhx of CAD s/p CABG, stent recently in 5/2022, HTN, DM2, CKD, PVD with stent presented to the ED for new onset syncopal episodes. Admit for syncope work up needing telemetry.     Problem/Plan - 1:  ·  Problem: Syncope.   ·  Plan: Assessment:  - patient has multiple episodes of syncope at home  - unclear etiology: no signs of lightheadedness before the episodes which could suspect orthostatic hypotension. However the patient is on multiple BP medications  - no hx of seizures  - no chest pain, Troponins neg x1, EKG shows RBBB and 1st degree AV block which is similar to previous EKG  - Echo from 5/2022 - normal EF, mild aortic stenosis  - orthostatic positive. Resolved.  - hold BP meds  - does not need a repeat echo as it was done 2 weeks ago  - cardiology consulted and recommending EP consult  .  - Neurology help appreciated. MRI noted.   - EEG normal .    -EP help appreciated . S/P PPM.      Problem/Plan - 2:  ·  Problem: Fall.   ·  Plan: Assessment:  - patient fell on concrete while sitting on the stairs  - No head trauma, but has skin scrapes on foot and elbow    Plan:  - PT eval - recommending earnest - refusing - home  - evaluate for etiology for fall as above.     Problem/Plan - 3:  ·  Problem: Benign essential HTN.   ·  Plan: - BP low so ARB with hold parameters.       Problem/Plan - 4:  ·  Problem: DM2 (diabetes mellitus, type 2).   ·  Plan: - 10U lantus QHs.     Problem/Plan - 5:  ·  Problem: CAD (coronary artery disease).   ·  Plan: - c/w DAPT.     Problem/Plan - 6:  ·  Problem: Stage 3 chronic kidney disease.   ·  Plan: - monitor for now.    Patient cleared for discharge home as per Dr. Dinero .Medication reconciliation reviewed with attending   Prescriptions sent to pharmacy of choice  Patient for discharge home with home care

## 2022-06-07 NOTE — PROGRESS NOTE ADULT - SUBJECTIVE AND OBJECTIVE BOX
Date of Service  : 06-07-22   INTERVAL HPI/OVERNIGHT EVENTS: Seen and examined earlier with son in room . Eating fine. BOP was low.   Vital Signs Last 24 Hrs  T(C): 36.7 (07 Jun 2022 10:47), Max: 36.7 (07 Jun 2022 05:22)  T(F): 98.1 (07 Jun 2022 10:47), Max: 98.1 (07 Jun 2022 10:47)  HR: 88 (07 Jun 2022 14:45) (82 - 88)  BP: 100/48 (07 Jun 2022 14:45) (100/48 - 159/63)  BP(mean): --  RR: 18 (07 Jun 2022 14:45) (17 - 18)  SpO2: 99% (07 Jun 2022 14:45) (95% - 100%)  I&O's Summary    MEDICATIONS  (STANDING):  aspirin enteric coated 81 milliGRAM(s) Oral daily  atorvastatin 80 milliGRAM(s) Oral at bedtime  BACItracin   Ointment 1 Application(s) Topical two times a day  cholecalciferol 1000 Unit(s) Oral daily  dextrose 5%. 1000 milliLiter(s) (100 mL/Hr) IV Continuous <Continuous>  dextrose 5%. 1000 milliLiter(s) (50 mL/Hr) IV Continuous <Continuous>  dextrose 50% Injectable 25 Gram(s) IV Push once  dextrose 50% Injectable 12.5 Gram(s) IV Push once  dextrose 50% Injectable 25 Gram(s) IV Push once  escitalopram 10 milliGRAM(s) Oral daily  gabapentin 300 milliGRAM(s) Oral two times a day  glucagon  Injectable 1 milliGRAM(s) IntraMuscular once  heparin   Injectable 5000 Unit(s) SubCutaneous every 12 hours  insulin glargine Injectable (LANTUS) 20 Unit(s) SubCutaneous <User Schedule>  insulin lispro (ADMELOG) corrective regimen sliding scale   SubCutaneous three times a day before meals  insulin lispro (ADMELOG) corrective regimen sliding scale   SubCutaneous at bedtime  losartan 25 milliGRAM(s) Oral daily  pantoprazole    Tablet 40 milliGRAM(s) Oral before breakfast  sodium chloride 0.9%. 1000 milliLiter(s) (75 mL/Hr) IV Continuous <Continuous>  sodium chloride 0.9%. 1000 milliLiter(s) (100 mL/Hr) IV Continuous <Continuous>  ticagrelor 90 milliGRAM(s) Oral every 12 hours    MEDICATIONS  (PRN):  acetaminophen     Tablet .. 650 milliGRAM(s) Oral every 6 hours PRN Temp greater or equal to 38C (100.4F), Mild Pain (1 - 3)  dextrose Oral Gel 15 Gram(s) Oral once PRN Blood Glucose LESS THAN 70 milliGRAM(s)/deciliter  melatonin 3 milliGRAM(s) Oral at bedtime PRN Insomnia    LABS:                        12.6   13.24 )-----------( 210      ( 07 Jun 2022 03:10 )             39.4     06-07    133<L>  |  103  |  23  ----------------------------<  175<H>  4.6   |  20<L>  |  1.57<H>    Ca    8.8      07 Jun 2022 03:10  Phos  2.1     06-07  Mg     1.90     06-07          CAPILLARY BLOOD GLUCOSE      POCT Blood Glucose.: 117 mg/dL (06 Jun 2022 19:01)          REVIEW OF SYSTEMS:  CONSTITUTIONAL: No fever, weight loss, or fatigue  EYES: No eye pain, visual disturbances, or discharge  ENMT:  No difficulty hearing, tinnitus, vertigo; No sinus or throat pain  NECK: No pain or stiffness  RESPIRATORY: No cough, wheezing, chills or hemoptysis; No shortness of breath  CARDIOVASCULAR: No chest pain, palpitations, dizziness, or leg swelling  GASTROINTESTINAL: No abdominal or epigastric pain. No nausea, vomiting, or hematemesis; No diarrhea or constipation. No melena or hematochezia.  GENITOURINARY: No dysuria, frequency, hematuria, or incontinence  NEUROLOGICAL: No headaches, memory loss, loss of strength, numbness, or tremors    Consultant(s) Notes Reviewed:  [x ] YES  [ ] NO    PHYSICAL EXAM:  GENERAL: NAD, well-groomed, well-developed,not in any distress ,  HEAD:  Atraumatic, Normocephalic  NECK: Supple, No JVD, Normal thyroid  NERVOUS SYSTEM:  Alert & Oriented X3, No focal deficit   CHEST/LUNG: Good air entry bilateral with no  rales, rhonchi, wheezing, or rubs  HEART: Regular rate and rhythm; No murmurs, rubs, or gallops  ABDOMEN: Soft, Nontender, Nondistended; Bowel sounds present  EXTREMITIES:  2+ Peripheral Pulses, No clubbing, cyanosis, or edema  PPM site Okay   Care Discussed with Consultants/Other Providers [ x] YES  [ ] NO

## 2022-06-08 ENCOUNTER — TRANSCRIPTION ENCOUNTER (OUTPATIENT)
Age: 87
End: 2022-06-08

## 2022-06-08 VITALS — WEIGHT: 155.21 LBS

## 2022-06-08 LAB
ANION GAP SERPL CALC-SCNC: 10 MMOL/L — SIGNIFICANT CHANGE UP (ref 7–14)
BUN SERPL-MCNC: 25 MG/DL — HIGH (ref 7–23)
CALCIUM SERPL-MCNC: 8.9 MG/DL — SIGNIFICANT CHANGE UP (ref 8.4–10.5)
CHLORIDE SERPL-SCNC: 104 MMOL/L — SIGNIFICANT CHANGE UP (ref 98–107)
CO2 SERPL-SCNC: 20 MMOL/L — LOW (ref 22–31)
CREAT SERPL-MCNC: 1.66 MG/DL — HIGH (ref 0.5–1.3)
EGFR: 39 ML/MIN/1.73M2 — LOW
GLUCOSE BLDC GLUCOMTR-MCNC: 176 MG/DL — HIGH (ref 70–99)
GLUCOSE BLDC GLUCOMTR-MCNC: 273 MG/DL — HIGH (ref 70–99)
GLUCOSE SERPL-MCNC: 224 MG/DL — HIGH (ref 70–99)
HCT VFR BLD CALC: 35.6 % — LOW (ref 39–50)
HGB BLD-MCNC: 11.6 G/DL — LOW (ref 13–17)
MAGNESIUM SERPL-MCNC: 2 MG/DL — SIGNIFICANT CHANGE UP (ref 1.6–2.6)
MCHC RBC-ENTMCNC: 29.6 PG — SIGNIFICANT CHANGE UP (ref 27–34)
MCHC RBC-ENTMCNC: 32.6 GM/DL — SIGNIFICANT CHANGE UP (ref 32–36)
MCV RBC AUTO: 90.8 FL — SIGNIFICANT CHANGE UP (ref 80–100)
NRBC # BLD: 0 /100 WBCS — SIGNIFICANT CHANGE UP
NRBC # FLD: 0 K/UL — SIGNIFICANT CHANGE UP
PHOSPHATE SERPL-MCNC: 2.3 MG/DL — LOW (ref 2.5–4.5)
PLATELET # BLD AUTO: 192 K/UL — SIGNIFICANT CHANGE UP (ref 150–400)
POTASSIUM SERPL-MCNC: 4.3 MMOL/L — SIGNIFICANT CHANGE UP (ref 3.5–5.3)
POTASSIUM SERPL-SCNC: 4.3 MMOL/L — SIGNIFICANT CHANGE UP (ref 3.5–5.3)
RBC # BLD: 3.92 M/UL — LOW (ref 4.2–5.8)
RBC # FLD: 13.5 % — SIGNIFICANT CHANGE UP (ref 10.3–14.5)
SODIUM SERPL-SCNC: 134 MMOL/L — LOW (ref 135–145)
WBC # BLD: 9.37 K/UL — SIGNIFICANT CHANGE UP (ref 3.8–10.5)
WBC # FLD AUTO: 9.37 K/UL — SIGNIFICANT CHANGE UP (ref 3.8–10.5)

## 2022-06-08 PROCEDURE — 99232 SBSQ HOSP IP/OBS MODERATE 35: CPT

## 2022-06-08 RX ORDER — CARVEDILOL PHOSPHATE 80 MG/1
1 CAPSULE, EXTENDED RELEASE ORAL
Qty: 60 | Refills: 0
Start: 2022-06-08 | End: 2022-07-07

## 2022-06-08 RX ORDER — GABAPENTIN 400 MG/1
1 CAPSULE ORAL
Qty: 60 | Refills: 0
Start: 2022-06-08 | End: 2022-07-07

## 2022-06-08 RX ORDER — TELMISARTAN 20 MG/1
1 TABLET ORAL
Qty: 0 | Refills: 0 | DISCHARGE

## 2022-06-08 RX ORDER — LOSARTAN POTASSIUM 100 MG/1
1 TABLET, FILM COATED ORAL
Qty: 30 | Refills: 0
Start: 2022-06-08 | End: 2022-07-07

## 2022-06-08 RX ADMIN — LOSARTAN POTASSIUM 25 MILLIGRAM(S): 100 TABLET, FILM COATED ORAL at 06:10

## 2022-06-08 RX ADMIN — Medication 650 MILLIGRAM(S): at 07:15

## 2022-06-08 RX ADMIN — PANTOPRAZOLE SODIUM 40 MILLIGRAM(S): 20 TABLET, DELAYED RELEASE ORAL at 06:11

## 2022-06-08 RX ADMIN — GABAPENTIN 300 MILLIGRAM(S): 400 CAPSULE ORAL at 06:11

## 2022-06-08 RX ADMIN — Medication 1000 UNIT(S): at 12:15

## 2022-06-08 RX ADMIN — INSULIN GLARGINE 20 UNIT(S): 100 INJECTION, SOLUTION SUBCUTANEOUS at 12:15

## 2022-06-08 RX ADMIN — Medication 3: at 12:15

## 2022-06-08 RX ADMIN — Medication 81 MILLIGRAM(S): at 12:15

## 2022-06-08 RX ADMIN — Medication 650 MILLIGRAM(S): at 06:12

## 2022-06-08 RX ADMIN — TICAGRELOR 90 MILLIGRAM(S): 90 TABLET ORAL at 06:11

## 2022-06-08 RX ADMIN — Medication 650 MILLIGRAM(S): at 12:20

## 2022-06-08 RX ADMIN — Medication 1: at 08:25

## 2022-06-08 RX ADMIN — Medication 650 MILLIGRAM(S): at 14:10

## 2022-06-08 RX ADMIN — ESCITALOPRAM OXALATE 10 MILLIGRAM(S): 10 TABLET, FILM COATED ORAL at 12:15

## 2022-06-08 NOTE — DIETITIAN INITIAL EVALUATION ADULT - NS FNS DIET ORDER
Diet, Regular:   Consistent Carbohydrate {No Snacks} (CSTCHO)  DASH/TLC {Sodium & Cholesterol Restricted} (DASH)  Angelic (06-01-22 @ 11:20) [Active]

## 2022-06-08 NOTE — DIETITIAN INITIAL EVALUATION ADULT - PERTINENT MEDS FT
MEDICATIONS  (STANDING):  aspirin enteric coated 81 milliGRAM(s) Oral daily  atorvastatin 80 milliGRAM(s) Oral at bedtime  BACItracin   Ointment 1 Application(s) Topical two times a day  cholecalciferol 1000 Unit(s) Oral daily  dextrose 5%. 1000 milliLiter(s) (50 mL/Hr) IV Continuous <Continuous>  dextrose 5%. 1000 milliLiter(s) (100 mL/Hr) IV Continuous <Continuous>  dextrose 50% Injectable 25 Gram(s) IV Push once  dextrose 50% Injectable 12.5 Gram(s) IV Push once  dextrose 50% Injectable 25 Gram(s) IV Push once  escitalopram 10 milliGRAM(s) Oral daily  gabapentin 300 milliGRAM(s) Oral two times a day  glucagon  Injectable 1 milliGRAM(s) IntraMuscular once  heparin   Injectable 5000 Unit(s) SubCutaneous every 12 hours  insulin glargine Injectable (LANTUS) 20 Unit(s) SubCutaneous <User Schedule>  insulin lispro (ADMELOG) corrective regimen sliding scale   SubCutaneous three times a day before meals  insulin lispro (ADMELOG) corrective regimen sliding scale   SubCutaneous at bedtime  losartan 25 milliGRAM(s) Oral daily  pantoprazole    Tablet 40 milliGRAM(s) Oral before breakfast  sodium chloride 0.9%. 1000 milliLiter(s) (75 mL/Hr) IV Continuous <Continuous>  sodium chloride 0.9%. 1000 milliLiter(s) (100 mL/Hr) IV Continuous <Continuous>  ticagrelor 90 milliGRAM(s) Oral every 12 hours    MEDICATIONS  (PRN):  acetaminophen     Tablet .. 650 milliGRAM(s) Oral every 6 hours PRN Temp greater or equal to 38C (100.4F), Mild Pain (1 - 3)  dextrose Oral Gel 15 Gram(s) Oral once PRN Blood Glucose LESS THAN 70 milliGRAM(s)/deciliter  melatonin 3 milliGRAM(s) Oral at bedtime PRN Insomnia

## 2022-06-08 NOTE — PROVIDER CONTACT NOTE (OTHER) - SITUATION
Patient /45 Post ambulation
Dizziness on exertion
Patient refusing finger stick for blood glucose check.

## 2022-06-08 NOTE — PROGRESS NOTE ADULT - ASSESSMENT
This is a 88 year old man with a pmhx of CAD s/p CABG, stent recently in 5/2022, HTN, T2DM, CKD, PVD with stent presented to the ED for new onset syncopal episode and was found to have bifascicular block on EKG.EP recommended PPM for cardiac syncope in the setting of apparent conduction disease as evidenced by ECG s/p Medtronic PPM on 6/6/2022 with 2 dose of Ancef for ppm pxx. CXR on 6/6/2022 showed no effusions or pneumothorax. PPM had some ecchymosis at incision site but no hematoma or drainage.       Plan:  syncope with  bifascicular block on EKG s/p PPM  - Continuous telemetric monitoring  - Monitor electrolytes and replete K to 4 and Mg to 2  - DENISE Gee gave patient post-PPM instruction.   - Patient is schedule for an appointment on 6/22/22 at 11:40am.   - Continue care per primary team    Mateo Smith PA-C  Patient to be staff with attending. Please awaiting attending addendum

## 2022-06-08 NOTE — DISCHARGE NOTE NURSING/CASE MANAGEMENT/SOCIAL WORK - PATIENT PORTAL LINK FT
You can access the FollowMyHealth Patient Portal offered by [facility] by registering at the following website: [email or web address]. By joining [facility]’s FollowMyHealth portal, you will also be able to view your health information using other applications (apps) compatible with our system.

## 2022-06-08 NOTE — DIETITIAN INITIAL EVALUATION ADULT - ORAL INTAKE PTA/DIET HISTORY
Patient reports fair-good appetite and PO intake PTA, requires Halal diet, dislikes chicken -- honored. NKFA per patient.

## 2022-06-08 NOTE — PROGRESS NOTE ADULT - ASSESSMENT
IMPRESSION: 88M w/ HTN, DM2, CAD-CABG, and HFpEF, 5/31/22 p/w falls/orthostatic hypotension    (1)Renal - nonproteinuric CKD 3b; resolved mild prerenally-mediated MABLE  (2)CV - orthostatic hypotension/associated syncope on admission. Now improved. That being said BP was a bit low yesterday and losartan was reduced from 50qd to 25qd   (3)EP - s/p PPM placement 6/6/22    RECOMMEND:  (1)Continue Losartan 25qd  (2)D/C planning per primary team; can f/u at our office on a prn basis    Faby Cummins NP-C  [facility]  [phone]

## 2022-06-08 NOTE — PROGRESS NOTE ADULT - SUBJECTIVE AND OBJECTIVE BOX
NEPHROLOGY     Patient seen and examined sitting on bed with son at bedside. Pt reports feeling ok, though states some lightheadedness after walking earlier, but now is feeling better, no sob, comfortable on room air, currently in no acute distress.     MEDICATIONS  (STANDING):  aspirin enteric coated 81 milliGRAM(s) Oral daily  atorvastatin 80 milliGRAM(s) Oral at bedtime  BACItracin   Ointment 1 Application(s) Topical two times a day  cholecalciferol 1000 Unit(s) Oral daily  dextrose 5%. 1000 milliLiter(s) (50 mL/Hr) IV Continuous <Continuous>  dextrose 5%. 1000 milliLiter(s) (100 mL/Hr) IV Continuous <Continuous>  dextrose 50% Injectable 25 Gram(s) IV Push once  dextrose 50% Injectable 12.5 Gram(s) IV Push once  dextrose 50% Injectable 25 Gram(s) IV Push once  escitalopram 10 milliGRAM(s) Oral daily  gabapentin 300 milliGRAM(s) Oral two times a day  glucagon  Injectable 1 milliGRAM(s) IntraMuscular once  heparin   Injectable 5000 Unit(s) SubCutaneous every 12 hours  insulin glargine Injectable (LANTUS) 20 Unit(s) SubCutaneous <User Schedule>  insulin lispro (ADMELOG) corrective regimen sliding scale   SubCutaneous three times a day before meals  insulin lispro (ADMELOG) corrective regimen sliding scale   SubCutaneous at bedtime  losartan 25 milliGRAM(s) Oral daily  pantoprazole    Tablet 40 milliGRAM(s) Oral before breakfast  sodium chloride 0.9%. 1000 milliLiter(s) (75 mL/Hr) IV Continuous <Continuous>  sodium chloride 0.9%. 1000 milliLiter(s) (100 mL/Hr) IV Continuous <Continuous>  ticagrelor 90 milliGRAM(s) Oral every 12 hours    VITALS:  T(C): , Max: 36.9 (06-08-22 @ 10:57)  T(F): , Max: 98.4 (06-08-22 @ 10:57)  HR: 91 (06-08-22 @ 11:05)  BP: 119/59 (06-08-22 @ 11:05)  RR: 18 (06-08-22 @ 10:57)  SpO2: 97% (06-08-22 @ 10:57)    PHYSICAL EXAM:  Constitutional: Frail; thin to cachectic  HEENT: DMM  Neck: Supple, No JVD  Respiratory: CTA-b/l  Cardiovascular: distant s1s2, (+)2/6 TODD  Gastrointestinal: BS+, soft, NT/ND  Extremities: No peripheral edema b/l  Neurological: no focal deficits; strength grossly intact  Back: no CVAT b/l  Skin: No rashes, no nevi    LABS:                        11.6   9.37  )-----------( 192      ( 08 Jun 2022 04:45 )             35.6     06-08    134<L>  |  104  |  25<H>  ----------------------------<  224<H>  4.3   |  20<L>  |  1.66<H>    Ca    8.9      08 Jun 2022 04:45  Phos  2.3     06-08  Mg     2.00     06-08

## 2022-06-08 NOTE — DIETITIAN INITIAL EVALUATION ADULT - OTHER INFO
Per chart, 88 year old man with a pmhx of CAD s/p CABG, stent recently in 5/2022, HTN, T2DM, CKD, PVD with stent presented to the ED for new onset syncopal episode and was found to have bifascicular block on EKG.EP recommended PPM for cardiac syncope in the setting of apparent conduction disease as evidenced by ECG s/p Medtronic PPM on 6/6/2022 with 2 dose of Ancef for ppm pxx. CXR on 6/6/2022 showed no effusions or pneumothorax. PPM had some ecchymosis at incision site but no hematoma or drainage.     RDN met with patient and son at bedside today. Patient reports his current appetite remains about the same but he tries to eat as much as he can, PO intake ~75% at meals reported, denies chewing/swallowing difficulties at meals, denies N/V/D but reports mild constipation -- last BM yesterday, encouraged adequate fluid and fiber intake to promote BMs. RDN reinforced Consistent carbohydrate and Heart Healthy diet with patient and son today and handouts were given. Patient states he tries to cut back on his carbohydrate intake at home. Patient and son with no questions about diet at this time.

## 2022-06-08 NOTE — DISCHARGE NOTE NURSING/CASE MANAGEMENT/SOCIAL WORK - NSDCPEFALRISK_GEN_ALL_CORE
For information on Fall & Injury Prevention, visit: [email or web address] OR  [email or web address] OR  https://www.cdc.gov/steadi/patient.html

## 2022-06-08 NOTE — PROGRESS NOTE ADULT - NS ATTEND AMEND GEN_ALL_CORE FT
87 y/o M with pmhx of CAD s/p CABG, stent recently in 5/2022, HTN, DM2, CKD, PVD with stent presented to the ED for new onset syncopal episodes. Sheldon dto have bifascicular block on EKG. He is s/p PPM placement yesterday for cardiac syncope in the setting of apparent conduction disease as evidenced by ECG.   PPM site with dressing. It was clean, dry, and intact. No bleeding, drainage hematoma, or ecchymosis appreciated.  Post-op PPM instructions have been verbally explained and given to the patient.  Patient expressed understanding and all questions were answered. A copy of the instruction is located in the patients chart. FU as outpt.
88 year old man with a pmhx of CAD s/p CABG, stent recently in 5/2022, HTN, T2DM, CKD, PVD with stent presented to the ED for new onset syncopal episode and was found to have bifascicular block on EKG.EP recommended PPM for cardiac syncope in the setting of apparent conduction disease as evidenced by ECG s/p Medtronic PPM on 6/6/2022 with 2 dose of Ancef for ppm pxx. CXR on 6/6/2022 showed no effusions or pneumothorax. PPM had some ecchymosis at incision site but no hematoma or drainage. Will follow as outpt.
87 y/o M with pmhx of CAD s/p CABG, stent recently in 5/2022, bifascicular block,  HTN, DM2, CKD, PVD with stent presented to the ED for new onset syncopal episodes. Plan for PPM placement. Will follow.

## 2022-06-08 NOTE — PROVIDER CONTACT NOTE (OTHER) - ASSESSMENT
Patient A&Ox4, VS as documented, NSR on tele. Patient denies chest pain and SOB. Patient denies dizziness and lightheadedness. Patient stated his fingers need a break from being stuck. Education provided about importance of blood glucose checks.
Patient A&Ox4, VS as documented, NSR on tele. Patient denies chest pain and SOB. Patient denies dizziness and lightheadedness. Patient stated his fingers need a break from being stuck. Education provided about importance of blood glucose checks. Patient eating breakfast at this time.
Patient A&Ox4, VS as documented, NSR on tele. Patient denies chest pain and SOB. Patient asymptomatic. Patient denies dizziness and lightheadedness. Post ambulation from bathroom to hallway chair. Patient in sitting position when obtained. BP post orthostatics, see prior BP documentation for orthostatics.
Patient A&Ox4, VS as documented, NSR on tele. Patient denies chest pain and SOB. Patient denies dizziness and lightheadedness. Patient stated his fingers need a break from being stuck. Education provided about importance of blood glucose checks.
Ambulated with patient in hallway with walker, after a few feet patients states his "head feeling unbalanced"

## 2022-06-08 NOTE — PROVIDER CONTACT NOTE (OTHER) - REASON
/45 Post ambulation
Patient refusing finger stick for blood glucose check
Dizziness on exertion
Patient refusing finger stick for blood glucose check
Patient refusing finger stick for blood glucose check

## 2022-06-08 NOTE — DIETITIAN INITIAL EVALUATION ADULT - ADD RECOMMEND
1. Monitor weights, labs, BM's, skin integrity, PO intake/tolerance. 2. Reinforce diet education with pt PRN. 3. Encourage po intake as tolerated and honor food preferences PRN.

## 2022-06-08 NOTE — PROGRESS NOTE ADULT - SUBJECTIVE AND OBJECTIVE BOX
Subjective: C/o some pain at PPM site last night. Denies HA, lightheadedness, dizziness, CP, palpitation, SOB, abdominal pain, and N/V.      Interval events:  - Presented after an unwitnessed syncopal event. Of note, patient was recently diagnosed with CHF exacerbation on the previous admission and was given a stent.   - Cardiology was c/s and patient was found to be orthostatic positive s/p IVF bolus and held home coreg. TTE 5/23/22 with nl LV function, mild-mod MR, mild AS, stage I diastolic dysfunction. Do not need to repeat.   - Neurology was consulted for syncope and patient's EEG was negative for epileptiform pattern or seizure seen.  - Nephrology was consulted for FLY which they deemed was pre-renal and recommended NS 100cc/h x 10 hours. Fly resolving   - EP was consulted for syncope. EP recommended PPM given that this overall presentation concerning for cardiac syncope in the setting of apparent conduction disease as evidenced by ECG s/p PPM on 6/6/2022 with 2 dose of Ancef for ppm pxx. CXR on 6/6/2022 showed no effusions or pneumothorax. DENISE Gee gave patient post-PPM instruction. Patient is schedule for an appointment on 6/22/22 at 11:40am. PPM had some ecchymosis at incision site but no hematoma or drainage.       MEDICATIONS  (STANDING):  aspirin enteric coated 81 milliGRAM(s) Oral daily  atorvastatin 80 milliGRAM(s) Oral at bedtime  BACItracin   Ointment 1 Application(s) Topical two times a day  cholecalciferol 1000 Unit(s) Oral daily  dextrose 5%. 1000 milliLiter(s) (50 mL/Hr) IV Continuous <Continuous>  dextrose 5%. 1000 milliLiter(s) (100 mL/Hr) IV Continuous <Continuous>  dextrose 50% Injectable 25 Gram(s) IV Push once  dextrose 50% Injectable 12.5 Gram(s) IV Push once  dextrose 50% Injectable 25 Gram(s) IV Push once  escitalopram 10 milliGRAM(s) Oral daily  gabapentin 300 milliGRAM(s) Oral two times a day  glucagon  Injectable 1 milliGRAM(s) IntraMuscular once  heparin   Injectable 5000 Unit(s) SubCutaneous every 12 hours  insulin glargine Injectable (LANTUS) 20 Unit(s) SubCutaneous <User Schedule>  insulin lispro (ADMELOG) corrective regimen sliding scale   SubCutaneous three times a day before meals  insulin lispro (ADMELOG) corrective regimen sliding scale   SubCutaneous at bedtime  losartan 25 milliGRAM(s) Oral daily  pantoprazole    Tablet 40 milliGRAM(s) Oral before breakfast  sodium chloride 0.9%. 1000 milliLiter(s) (75 mL/Hr) IV Continuous <Continuous>  sodium chloride 0.9%. 1000 milliLiter(s) (100 mL/Hr) IV Continuous <Continuous>  ticagrelor 90 milliGRAM(s) Oral every 12 hours    MEDICATIONS  (PRN):  acetaminophen     Tablet .. 650 milliGRAM(s) Oral every 6 hours PRN Temp greater or equal to 38C (100.4F), Mild Pain (1 - 3)  dextrose Oral Gel 15 Gram(s) Oral once PRN Blood Glucose LESS THAN 70 milliGRAM(s)/deciliter  melatonin 3 milliGRAM(s) Oral at bedtime PRN Insomnia      Vital Signs Last 24 Hrs  T(C): 36.7 (08 Jun 2022 06:00), Max: 36.8 (07 Jun 2022 21:23)  T(F): 98 (08 Jun 2022 06:00), Max: 98.2 (07 Jun 2022 21:23)  HR: 86 (08 Jun 2022 06:00) (80 - 88)  BP: 161/65 (08 Jun 2022 06:00) (100/48 - 161/65)  BP(mean): --  RR: 17 (08 Jun 2022 06:00) (17 - 18)  SpO2: 98% (08 Jun 2022 06:00) (95% - 100%)  I&O's Detail      Physical Exam:  GENERAL: Lying in bed,  in NAD  HEART: S1S2 RRR with a 2/6 systolic murmur   PULMONARY: CTABL anteriorly, normal respiratory effort.    PPM site was clean, dry, and intact. No bleeding, drainage hematoma, or appreciated.  Some ecchymosis at incision site.  ABDOMEN: + Bowel sounds present, soft  EXTREMITIES:  Warm, well -perfused, no pedal edema, distal pulses present  NEUROLOGICAL:AOx3    TELEMETERIC: 1 st degree Av block with few PVC, V paced on demand                         11.6   9.37  )-----------( 192      ( 08 Jun 2022 04:45 )             35.6       06-08    134<L>  |  104  |  25<H>  ----------------------------<  224<H>  4.3   |  20<L>  |  1.66<H>    Ca    8.9      08 Jun 2022 04:45  Phos  2.3     06-08  Mg     2.00     06-08

## 2022-06-08 NOTE — PROGRESS NOTE ADULT - PROVIDER SPECIALTY LIST ADULT
Nephrology
Nephrology
Electrophysiology
Internal Medicine
Nephrology
Nephrology
Neurology
Cardiology
Cardiology
Electrophysiology
Internal Medicine
Nephrology
Electrophysiology
Internal Medicine
Internal Medicine

## 2022-06-08 NOTE — PROVIDER CONTACT NOTE (OTHER) - ACTION/TREATMENT ORDERED:
ACP aware, BP in flowsheet, will continue to monitor
ACP made aware. No further orders at this time.

## 2022-06-08 NOTE — DISCHARGE NOTE NURSING/CASE MANAGEMENT/SOCIAL WORK - NSSCTYPOFSERV_GEN_ALL_CORE
Visiting Nurse to call and schedule visit time for the day after discharge. Visiting Nurse to arrange home physical therapy.

## 2022-06-08 NOTE — DIETITIAN INITIAL EVALUATION ADULT - PERTINENT LABORATORY DATA
06-08    134<L>  |  104  |  25<H>  ----------------------------<  224<H>  4.3   |  20<L>  |  1.66<H>    Ca    8.9      08 Jun 2022 04:45  Phos  2.3     06-08  Mg     2.00     06-08    POCT Blood Glucose.: 273 mg/dL (06-08-22 @ 12:03)  A1C with Estimated Average Glucose Result: 7.5 % (05-23-22 @ 06:22)  A1C with Estimated Average Glucose Result: 8.8 % (02-27-22 @ 02:24)    CAPILLARY BLOOD GLUCOSE  POCT Blood Glucose.: 273 mg/dL (08 Jun 2022 12:03)  POCT Blood Glucose.: 176 mg/dL (08 Jun 2022 08:22)  POCT Blood Glucose.: 213 mg/dL (07 Jun 2022 22:24)  POCT Blood Glucose.: 278 mg/dL (07 Jun 2022 17:37)

## 2022-06-23 ENCOUNTER — APPOINTMENT (OUTPATIENT)
Dept: ELECTROPHYSIOLOGY | Facility: CLINIC | Age: 87
End: 2022-06-23
Payer: MEDICARE

## 2022-06-23 DIAGNOSIS — I45.2 BIFASCICULAR BLOCK: ICD-10-CM

## 2022-06-23 DIAGNOSIS — I10 ESSENTIAL (PRIMARY) HYPERTENSION: ICD-10-CM

## 2022-06-23 DIAGNOSIS — E11.9 TYPE 2 DIABETES MELLITUS W/OUT COMPLICATIONS: ICD-10-CM

## 2022-06-23 DIAGNOSIS — Z78.9 OTHER SPECIFIED HEALTH STATUS: ICD-10-CM

## 2022-06-23 DIAGNOSIS — N18.9 CHRONIC KIDNEY DISEASE, UNSPECIFIED: ICD-10-CM

## 2022-06-23 DIAGNOSIS — I25.10 ATHEROSCLEROTIC HEART DISEASE OF NATIVE CORONARY ARTERY W/OUT ANGINA PECTORIS: ICD-10-CM

## 2022-06-23 DIAGNOSIS — I25.2 OLD MYOCARDIAL INFARCTION: ICD-10-CM

## 2022-06-23 PROCEDURE — 99024 POSTOP FOLLOW-UP VISIT: CPT

## 2022-06-23 RX ORDER — DEXLANSOPRAZOLE 60 MG/1
60 CAPSULE, DELAYED RELEASE ORAL
Refills: 0 | Status: ACTIVE | COMMUNITY

## 2022-06-23 RX ORDER — LINACLOTIDE 145 UG/1
145 CAPSULE, GELATIN COATED ORAL
Refills: 0 | Status: ACTIVE | COMMUNITY

## 2022-06-23 RX ORDER — LINAGLIPTIN 5 MG/1
5 TABLET, FILM COATED ORAL DAILY
Refills: 0 | Status: ACTIVE | COMMUNITY

## 2022-06-23 RX ORDER — CARVEDILOL 3.12 MG/1
3.12 TABLET, FILM COATED ORAL
Refills: 0 | Status: ACTIVE | COMMUNITY

## 2022-06-23 RX ORDER — ESCITALOPRAM OXALATE 10 MG/1
10 TABLET, FILM COATED ORAL DAILY
Refills: 0 | Status: ACTIVE | COMMUNITY

## 2022-06-23 RX ORDER — EMPAGLIFLOZIN 25 MG/1
25 TABLET, FILM COATED ORAL DAILY
Refills: 0 | Status: ACTIVE | COMMUNITY

## 2022-06-23 RX ORDER — LOSARTAN POTASSIUM 25 MG/1
25 TABLET, FILM COATED ORAL
Refills: 0 | Status: ACTIVE | COMMUNITY

## 2022-06-23 RX ORDER — GABAPENTIN 100 MG
100 TABLET ORAL
Refills: 0 | Status: ACTIVE | COMMUNITY

## 2022-06-23 RX ORDER — INSULIN GLARGINE 100 [IU]/ML
100 INJECTION, SOLUTION SUBCUTANEOUS
Refills: 0 | Status: ACTIVE | COMMUNITY

## 2022-06-23 RX ORDER — ASPIRIN 81 MG/1
81 TABLET ORAL
Refills: 0 | Status: ACTIVE | COMMUNITY

## 2022-06-23 RX ORDER — ROSUVASTATIN CALCIUM 40 MG/1
40 TABLET, FILM COATED ORAL DAILY
Refills: 0 | Status: ACTIVE | COMMUNITY

## 2022-06-23 RX ORDER — LORATADINE 10 MG
17 TABLET,DISINTEGRATING ORAL
Refills: 0 | Status: ACTIVE | COMMUNITY

## 2022-06-23 RX ORDER — TICAGRELOR 90 MG/1
90 TABLET ORAL TWICE DAILY
Refills: 0 | Status: ACTIVE | COMMUNITY

## 2022-07-11 NOTE — PROGRESS NOTE ADULT - PROBLEM SELECTOR PROBLEM 6
Chronic kidney disease, unspecified CKD stage
Dapsone Counseling: I discussed with the patient the risks of dapsone including but not limited to hemolytic anemia, agranulocytosis, rashes, methemoglobinemia, kidney failure, peripheral neuropathy, headaches, GI upset, and liver toxicity.  Patients who start dapsone require monitoring including baseline LFTs and weekly CBCs for the first month, then every month thereafter.  The patient verbalized understanding of the proper use and possible adverse effects of dapsone.  All of the patient's questions and concerns were addressed.

## 2022-08-04 ENCOUNTER — APPOINTMENT (OUTPATIENT)
Dept: ELECTROPHYSIOLOGY | Facility: CLINIC | Age: 87
End: 2022-08-04

## 2022-08-19 ENCOUNTER — APPOINTMENT (OUTPATIENT)
Dept: ELECTROPHYSIOLOGY | Facility: CLINIC | Age: 87
End: 2022-08-19

## 2022-08-19 VITALS — SYSTOLIC BLOOD PRESSURE: 94 MMHG | DIASTOLIC BLOOD PRESSURE: 55 MMHG

## 2022-08-19 VITALS — SYSTOLIC BLOOD PRESSURE: 94 MMHG | DIASTOLIC BLOOD PRESSURE: 59 MMHG

## 2022-08-19 DIAGNOSIS — I45.5 OTHER SPECIFIED HEART BLOCK: ICD-10-CM

## 2022-08-19 DIAGNOSIS — R55 SYNCOPE AND COLLAPSE: ICD-10-CM

## 2022-08-19 PROCEDURE — 93280 PM DEVICE PROGR EVAL DUAL: CPT

## 2022-08-19 RX ORDER — ROSUVASTATIN CALCIUM 40 MG/1
40 TABLET, FILM COATED ORAL
Refills: 0 | Status: ACTIVE | COMMUNITY

## 2022-08-22 NOTE — PROGRESS NOTE ADULT - PROBLEM SELECTOR PLAN 7
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 BMBx completed 8/20, final path pending. Pre-phase steroids initiated x8/21.     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 While at OSH, she had CT PE protocol as well as CT A/P. These identified no PE, no acute or localized intraabdominal inflammatory process, mild splenomegaly, and few inconspicous low-density structures in the liver. She was transferred [facility] for further workup and management. Labs upon presentation showed WBC 109K (ANC 15.3, 85% other cells), Hgb 13.2, Plt 73.  A uric acid level of 5.9, alkaline phosphatase of 131, AST 73, INR 1.19. Elevated uric acid may be secondary to tumor lysis, while elevated liver enzymes may be secondary to leukemic involvement.    - Hydrea initiated x8/20, continue 1g ITD - WBC trending down slowly (100K ?85K) as of 8/22.   - EKG shows nl sinus rhythm, QTc 440. ECHO with EF  65-70%.   - PICC line placed, plan to remove at discharge  - CT CAP completed at OSH. CT neck 8/22 to completed staging - pending  - MR brain (8/21) - no acute IC pathology, no e/o intracranial CNS involvement.  - BMBx completed 8/20 - pending  - Pt consented to HMTB; BMBx at dx was dry tap d/t WBC burden. Will send peripheral blood samples on 8/22.   - CBC/TLS/DIC labs three times daily  - Initiated pre-phase steroids with Prednisone 60 mg x8/21; increased to 60 mg/m2 x8/22 (140 mg)  - Will send message to BMT intake team once final path is resulted   - Plan to arrange for Clonoseq ID testing to help estimate MRD in future assessments     # Risk of TLS/DIC  Uric acid was mildly elevated to 5.9 and LDH 1,943 on admission. Otherwise TLS labs wnl including Cr ~0.6-0.7. INR mildly elevated to 1.2, Fibrinogen 400-500.   - Started Allopurinol 300 mg daily x8/19  - IVF at 100 ml/h x8/21, continue for now  - Monitor TLS/DIC labs q8h    # Pancytopenia  2/2 to underlying disease  - Transfuse to maintain Hgb >7, Plt >10K     ID  # PPx  - Viral serologies sent - HepB/C, HIV, CMV, HSV2 negative. HSV1+, EBV IgG+ (649).   - ACV 400mg BID  - Started Levaquin 250 mg daily x8/21  - Started Maude 50 mg daily x8/21 in anticipation of upcoming chemo; will need to look into coverage for Posa/Vori in coming days.      # H/o COVID19   Not vaccinated (due to fear of needles per her report). Pt reports testing postiive for COVID approx 1 month ago via home test. She never had it confirmed.   - COVID testing on admission was negative.     CV  # Hypertension  Fairly persistent elevated BP since admission to 150-160/80-90s. She was not on anti-hypertensive medication PTA.   - Start Amlodipine 5 mg daily x8/21; consider increasing in coming days  - prn Hydralazine 10-20 mg available q6h for SBP>160     NEURO  # Headaches  Pt endorses intermittent HA since admission. Denies vision changes or other neurological sx. HTN as above.   - MRI brain  completed 8/21 was negative for IC pathology or tumor involvement.   - Tylenol prn   - Consider LP/ppx IT chemo end-of week as WBC trends down      MISC  # Hypophosphatemia  - Replete prn standing protocol     FEN  - IVFs with NS @ 100 ml/hr  - lyte repletion per protocol, monitor closely given risk for TLS  - regular diet as tolerated     Lines/Drains: PICC placed, remove on discharge  DVT ppx: defer d/t TCP  GI ppx: PPI  Consults: PT  CODE: FULL  DISPO: Anticipate prolonged 4-6 week hospitalization for management of new leukemia.   Follow-up/Referrals: TBD. Dr. Hull was on service when pt was admitted.   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 Hull.    Sri Poole PA-C  Hematology/Oncology  Pager: [phone]    >75 minutes spent on the date of the encounter. Over 50% of time was spent counseling the patient and/or coordinating care.     Interval History   Ongoing HTN overnight, otherwise VSS. Pt had BMx1. MRI brain was completed. TLS/DIC labs ok. WBC trending down slowly.     This morning pt reports feeling ok. States she had a mild HA last night which resolved with Tylenol/sleeping. She has felt overwhelmed the past couple of days with the new diagnosis and information, though is beginning to process things. /children have been supportive though anxious as well. She denies new complaints today. Endorses very mild generalized back discomfort which she attributes to the hospital bed. Also with ongoing mild RUQ abdominal discomfort which she states comes and goes. Awaiting final BMBx results prior to initiation of chemotherapy. Encouraged PO intake and activity OOB. All questions answered.     A comprehensive review of systems was obtained and is negative other than noted  here or in the HPI.     Physical Exam   Temp: (!) 96.2  F (35.7  C) Temp src: Oral BP: (!) 156/87 Pulse: 84   Resp: 22 SpO2: 95 % O2 Device: None (Room air)    Vitals:    08/19/22 1633 08/20/22 0802 08/21/22 0905   Weight: 113.5 kg (250 lb 4.8 oz) 113.2 kg (249 lb 8 oz) 114.1 kg (251 lb 9.6 oz)     Vital Signs with Ranges  Temp:  [96.2  F (35.7  C)-96.7  F (35.9  C)] 96.2  F (35.7  C)  Pulse:  [73-85] 84  Resp:  [18-22] 22  BP: (156-168)/(82-97) 156/87  SpO2:  [93 %-97 %] 95 %  I/O last 3 completed shifts:  In: 2914 [P.O.:1480; I.V.:1434]  Out: 2800 [Urine:2800]    General: Awake and interactive. No acute distress. Pleasant female seen sitting upright comfortably in bed. Tearful at times.   Skin: Warm, dry, and intact without rashes or lesions.   Head: Normocephalic and atraumatic.   HEENT: PERRLA. Sclera non-icteric. EOM intact. Oral mucosa is pink and moist with no lesions, thrush, or exudates.   Lymph: Neck supple.   Cardiac: Regular rate and rhythm. Normal S1 and S2. No murmurs, rubs, or gallops.   Respiratory: No signs of respiratory distress or accessory muscle use. Lungs CTAB. No wheezes or crackles.   Abd: Soft, symmetric, and non-tender without distension. BS present.   Extremities: Trace b/l LE edema.   Neuro: A&Ox3 with normal speech. Memory and thought process preserved. Motor function normal with 5/5 muscle strength bilaterally to UE and LE. Sensation intact bilaterally. No focal deficits.  Psych: Appropriate mood and affect.     Medications     - MEDICATION INSTRUCTIONS -       sodium chloride 100 mL/hr at 08/22/22 0301       acyclovir  400 mg Oral BID     allopurinol  300 mg Oral Daily     amLODIPine  5 mg Oral Daily     hydroxyurea  1,000 mg Oral TID     levofloxacin  250 mg Oral Q24H     micafungin  50 mg Intravenous Q24H     pantoprazole  40 mg Oral QAM AC     [Held by provider] predniSONE  60 mg Oral Daily     Data   Results for orders placed or performed during the hospital encounter of 08/19/22  not applicable (Male) (from the past 24 hour(s))   CBC with platelets differential    Narrative    The following orders were created for panel order CBC with platelets differential.  Procedure                               Abnormality         Status                     ---------                               -----------         ------                     CBC with platelets and d...[[id number]]  Abnormal            Preliminary result           Please view results for these tests on the individual orders.   Fibrinogen activity   Result Value Ref Range    Fibrinogen Activity 475 170 - 490 mg/dL   INR   Result Value Ref Range    INR 1.35 (H) 0.85 - 1.15   Phosphorus   Result Value Ref Range    Phosphorus 3.3 2.5 - 4.5 mg/dL   Uric acid   Result Value Ref Range    Uric Acid 3.1 2.4 - 5.7 mg/dL   Basic metabolic panel   Result Value Ref Range    Creatinine 0.65 0.51 - 0.95 mg/dL    Sodium 138 136 - 145 mmol/L    Potassium 3.7 3.4 - 5.3 mmol/L    Urea Nitrogen 9.2 6.0 - 20.0 mg/dL    Chloride 105 98 - 107 mmol/L    Carbon Dioxide (CO2) 22 22 - 29 mmol/L    Anion Gap 11 7 - 15 mmol/L    Glucose 85 70 - 99 mg/dL    GFR Estimate >90 >60 mL/min/1.73m2    Calcium 8.8 8.6 - 10.0 mg/dL   CBC with platelets and differential   Result Value Ref Range    WBC Count 109.4 (HH) 4.0 - 11.0 10e3/uL    RBC Count 4.41 3.80 - 5.20 10e6/uL    Hemoglobin 12.7 11.7 - 15.7 g/dL    Hematocrit 38.9 35.0 - 47.0 %    MCV 88 78 - 100 fL    MCH 28.8 26.5 - 33.0 pg    MCHC 32.6 31.5 - 36.5 g/dL    RDW 14.6 10.0 - 15.0 %    Platelet Count 54 (L) 150 - 450 10e3/uL    NRBCs per 100 WBC 0 <1 /100    Absolute NRBCs 0.1 10e3/uL   MR Brain w/o & w Contrast    Narrative     MR BRAIN W/O & W CONTRAST 8/21/2022 3:02 PM    Provided History: New acute leukemia with head aches r/o intracranial  hemorrhage or CNS involvement.    Comparison: None.    Technique: Multiplanar T1-weighted, axial FLAIR, and susceptibility  images were obtained without intravenous contrast.  Following  intravenous gadolinium-based contrast administration, axial  T2-weighted, diffusion, and T1-weighted images (in multiple planes)  were obtained.    Contrast: Gadavist 11.4 mL    Findings:  There is no mass effect, midline shift, or evidence of intracranial  hemorrhage. The ventricles are proportionate to the cerebral sulci.  Normal major vascular intracranial flow-voids.    Postcontrast images demonstrate no abnormal intracranial enhancement.    No abnormality of the skull marrow signal. Mucosal thickening in the  right maxillary sinus. Trace fluid in the right mastoid air cells. The  orbits are grossly unremarkable.    Partially visualized prominent suboccipital, right level 5 and  bilateral upper cervical chain lymph nodes.      Impression    Impression:   1. No acute intracranial pathology. No evidence for intracranial tumor  or hemorrhage.  2. Partially visualized prominent suboccipital, right level 5 and  upper cervical chain lymph nodes.    I have personally reviewed the examination and initial interpretation  and I agree with the findings.    MACO GARZON MD         SYSTEM ID:  [id number]   CBC with platelets differential    Narrative    The following orders were created for panel order CBC with platelets differential.  Procedure                               Abnormality         Status                     ---------                               -----------         ------                     CBC with platelets and d...[[id number]]  Abnormal            Final result               Manual Differential[[id number]]          Abnormal            Preliminary result           Please view results for these tests on the individual orders.   Fibrinogen activity   Result Value Ref Range    Fibrinogen Activity 502 (H) 170 - 490 mg/dL   INR   Result Value Ref Range    INR 1.27 (H) 0.85 - 1.15   Phosphorus   Result Value Ref Range    Phosphorus 2.7 2.5 - 4.5 mg/dL   Uric acid   Result Value Ref Range    Uric Acid 2.8  "2.4 - 5.7 mg/dL   Basic metabolic panel   Result Value Ref Range    Creatinine 0.59 0.51 - 0.95 mg/dL    Sodium 137 136 - 145 mmol/L    Potassium 3.9 3.4 - 5.3 mmol/L    Urea Nitrogen 10.4 6.0 - 20.0 mg/dL    Chloride 105 98 - 107 mmol/L    Carbon Dioxide (CO2) 21 (L) 22 - 29 mmol/L    Anion Gap 11 7 - 15 mmol/L    Glucose 123 (H) 70 - 99 mg/dL    GFR Estimate >90 >60 mL/min/1.73m2    Calcium 9.2 8.6 - 10.0 mg/dL   CBC with platelets and differential   Result Value Ref Range    WBC Count 100.4 (HH) 4.0 - 11.0 10e3/uL    RBC Count 4.47 3.80 - 5.20 10e6/uL    Hemoglobin 12.9 11.7 - 15.7 g/dL    Hematocrit 39.2 35.0 - 47.0 %    MCV 88 78 - 100 fL    MCH 28.9 26.5 - 33.0 pg    MCHC 32.9 31.5 - 36.5 g/dL    RDW 14.6 10.0 - 15.0 %    Platelet Count 57 (L) 150 - 450 10e3/uL    Narrative    Previously reported component [ NRBCs ] is no longer reported.\"  Previously reported component [ NRBCs Absolute ] is no longer reported.\"   Manual Differential   Result Value Ref Range    % Neutrophils 17 %    % Lymphocytes 5 %    % Monocytes 2 %    % Eosinophils 0 %    % Basophils 0 %    % Other Cells 76 %    Absolute Neutrophils 17.1 (H) 1.6 - 8.3 10e3/uL    Absolute Lymphocytes 5.0 0.8 - 5.3 10e3/uL    Absolute Monocytes 2.0 (H) 0.0 - 1.3 10e3/uL    Absolute Eosinophils 0.0 0.0 - 0.7 10e3/uL    Absolute Basophils 0.0 0.0 - 0.2 10e3/uL    Absolute Other Cells 76.3 (H) <=0.0 10e3/uL    RBC Morphology Confirmed RBC Indices     Platelet Assessment  Automated Count Confirmed. Platelet morphology is normal.     Automated Count Confirmed. Platelet morphology is normal.    Polychromasia Slight (A) None Seen    Pathologist Review Comments     Lactic Acid STAT   Result Value Ref Range    Lactic Acid 0.9 0.7 - 2.0 mmol/L   ABO/Rh type and screen    Narrative    The following orders were created for panel order ABO/Rh type and screen.  Procedure                               Abnormality         Status                     ---------                 "               -----------         ------                     Adult Type and Screen[[id number]]                            Final result                 Please view results for these tests on the individual orders.   CBC with platelets differential    Narrative    The following orders were created for panel order CBC with platelets differential.  Procedure                               Abnormality         Status                     ---------                               -----------         ------                     CBC with platelets and d...[[id number]]  Abnormal            Preliminary result           Please view results for these tests on the individual orders.   Lactate Dehydrogenase   Result Value Ref Range    Lactate Dehydrogenase 2,458 (H) 0 - 250 U/L   Fibrinogen activity   Result Value Ref Range    Fibrinogen Activity 465 170 - 490 mg/dL   INR   Result Value Ref Range    INR 1.20 (H) 0.85 - 1.15   Phosphorus   Result Value Ref Range    Phosphorus 3.2 2.5 - 4.5 mg/dL   Uric acid   Result Value Ref Range    Uric Acid 3.5 2.4 - 5.7 mg/dL   Basic metabolic panel   Result Value Ref Range    Creatinine 0.62 0.51 - 0.95 mg/dL    Sodium 141 136 - 145 mmol/L    Potassium 3.4 3.4 - 5.3 mmol/L    Urea Nitrogen 9.7 6.0 - 20.0 mg/dL    Chloride 106 98 - 107 mmol/L    Carbon Dioxide (CO2) 25 22 - 29 mmol/L    Anion Gap 10 7 - 15 mmol/L    Glucose 88 70 - 99 mg/dL    GFR Estimate >90 >60 mL/min/1.73m2    Calcium 9.0 8.6 - 10.0 mg/dL   Hepatic panel   Result Value Ref Range    Protein Total 7.2 6.4 - 8.3 g/dL    Albumin 3.9 3.5 - 5.2 g/dL    Bilirubin Total 0.5 <=1.2 mg/dL    Alkaline Phosphatase 110 (H) 35 - 104 U/L    AST 78 (H) 10 - 35 U/L    ALT 25 10 - 35 U/L    Bilirubin Direct <0.20 0.00 - 0.30 mg/dL   Magnesium   Result Value Ref Range    Magnesium 1.9 1.7 - 2.3 mg/dL   Other Laboratory; TTL; Please draw 5mL in each of 3 green sodium heparin tubes for Hematologic Malignancy [facility]. (Laboratory Miscellaneous  Order)   Result Value Ref Range    See Scanned Result See Scanned Result     Performing Laboratory TTL     Test Name       Please draw 5mL in each of 3 green sodium heparin tubes for Hematologic Malignancy [facility].    Test Code TTL    Adult Type and Screen   Result Value Ref Range    ABO/RH(D) O POS     Antibody Screen Negative Negative    SPECIMEN EXPIRATION DATE [id number]    CBC with platelets and differential   Result Value Ref Range    WBC Count 85.3 (HH) 4.0 - 11.0 10e3/uL    RBC Count 4.33 3.80 - 5.20 10e6/uL    Hemoglobin 12.4 11.7 - 15.7 g/dL    Hematocrit 37.8 35.0 - 47.0 %    MCV 87 78 - 100 fL    MCH 28.6 26.5 - 33.0 pg    MCHC 32.8 31.5 - 36.5 g/dL    RDW 14.7 10.0 - 15.0 %    Platelet Count 47 (LL) 150 - 450 10e3/uL    NRBCs per 100 WBC 0 <1 /100    Absolute NRBCs 0.1 10e3/uL      Patient reports that he's "had this cough for a while". Family member reports "everyone in the family has had a cough". Pt reports that family member who is a physician prescribed him a z-pack last week. 2/2 URI vs post-nasal drip.  Symptomatic treatment.  - c/w tessalon perle  - c/w cepacol lozenge  - c/w flonase  - c/w robitussin  - c/w hycodan   - c/w duonebs as needed Patient reports that he's "had this cough for a while". Family member reports "everyone in the family has had a cough". Pt reports that family member who is a physician prescribed him a z-pack last week.   CXR clear.  RVP negative.  No fever, no white count.   2/2 post-nasal drip.  Symptomatic treatment.  - c/w tessalon perle  - c/w cepacol lozenge  - c/w flonase  - c/w robitussin  - c/w hycodan   - c/w duonebs q4hr  - c/w hypertonic saline

## 2022-08-22 NOTE — H&P ADULT - NSCORESITESY/N_GEN_A_CORE_RD
Patient presents for a screening Mantoux Tuberculin Skin Test for employment.  Does not have a history of BCG Vaccine.  PPD 5TU 0.1ml placed on Right forearm, 8/22/2022 at 2:36 PM.  Patient given instructions to return in 48 to 72 hours to have Skin Test read.    Documented by: Gela Bishop PA-C     Yes

## 2022-10-25 ENCOUNTER — FORM ENCOUNTER (OUTPATIENT)
Age: 87
End: 2022-10-25

## 2022-11-22 ENCOUNTER — APPOINTMENT (OUTPATIENT)
Dept: ELECTROPHYSIOLOGY | Facility: CLINIC | Age: 87
End: 2022-11-22

## 2022-11-22 ENCOUNTER — NON-APPOINTMENT (OUTPATIENT)
Age: 87
End: 2022-11-22

## 2022-11-22 PROCEDURE — 93294 REM INTERROG EVL PM/LDLS PM: CPT

## 2022-11-22 PROCEDURE — 93296 REM INTERROG EVL PM/IDS: CPT

## 2023-01-09 NOTE — ED PROVIDER NOTE - IV ALTEPLASE ADMIN OUTSIDE HIDDEN
"[facility]    Medicine Progress Note - Hospitalist Service    Date of Admission:  1/9/2023    Assessment & Plan   L knee OA s/p L TKA with Dr. Johnson. POD#0.   -pain control, wound care, PT/OT, diet per surgery    Hx of afib and lupus anticoag, on chronic anticoag with warfarin. On hold for last week prior to surgery.   -per discussion with PCP, plan is for 120mg bid of lovenox to bridge to therapeutic INR on coumadin    HTN. BP stable.   -ok to resume norvasc and hydrochlorothiazide (12.5mg daily)       Diet: Advance Diet as Tolerated: Regular Diet Adult  Discharge Instruction - Regular Diet Adult    DVT Prophylaxis: lovenox and coumadin  Ramirez Catheter: Not present  Lines: None     Cardiac Monitoring: None  Code Status: Full Code      Clinically Significant Risk Factors Present on Admission               # Drug Induced Coagulation Defect: home medication list includes an anticoagulant medication         # Severe Obesity: Estimated body mass index is 42.88 kg/m  as calculated from the following:    Height as of this encounter: 1.854 m (6' 1\").    Weight as of this encounter: 147.4 kg (325 lb).           Disposition Plan      Expected Discharge Date: 01/10/2023      Destination: home with family            Raina DíazDO  Hospitalist Service  [facility]  Securely message with [employer] (more info)  Text page via [employer] Paging/Directory   ______________________________________________________________________    Interval History   Patient seen postoperatively. Doing well.     Physical Exam   Vital Signs: Temp: 97.6  F (36.4  C) Temp src: Tympanic BP: (!) 149/76 Pulse: 68   Resp: 14 SpO2: 95 % O2 Device: None (Room air) Oxygen Delivery: 2 LPM  Weight: 325 lbs 0 oz    General Appearance: AAO, morbidly obese. NAD  Respiratory: CTA B/L   Cardiovascular: IRR  Skin: warm, dry.   Other: Bandage of L knee with some bruising. No drainage     Medical Decision Making   30 MINUTES " SPENT BY ME on the date of service doing chart review, history, exam, documentation & further activities per the note.  MANAGEMENT DISCUSSED with the following over the past 24 hours: Dr. Johnson   NOTE(S)/MEDICAL RECORDS REVIEWED over the past 24 hours: H andP  Tests REVIEWED in the past 24 hours:      Data     I have personally reviewed the following data over the past 24 hrs:    INR:  0.97 PTT:  N/A   D-dimer:  N/A Fibrinogen:  N/A       Imaging results reviewed over the past 24 hrs:   Recent Results (from the past 24 hour(s))   XR Knee Port Left 1/2 Views    Narrative    PROCEDURE: XR KNEE PORT LEFT 1/2 VIEWS 1/9/2023 1:33 PM    HISTORY: Post-Op Total Knee    COMPARISONS: 11/2/2022.    TECHNIQUE: 2 views.    FINDINGS: There is a left knee arthroplasty without acute  complication. Skin staples are seen anteriorly and there is soft  tissue gas consistent with postoperative state.         Impression    IMPRESSION: Interval left knee arthroplasty.    ISREAL REYNOSO MD         SYSTEM ID:  [id number]      show

## 2023-02-22 ENCOUNTER — FORM ENCOUNTER (OUTPATIENT)
Age: 88
End: 2023-02-22

## 2023-02-23 ENCOUNTER — APPOINTMENT (OUTPATIENT)
Dept: ELECTROPHYSIOLOGY | Facility: CLINIC | Age: 88
End: 2023-02-23

## 2023-04-13 ENCOUNTER — APPOINTMENT (OUTPATIENT)
Dept: ELECTROPHYSIOLOGY | Facility: CLINIC | Age: 88
End: 2023-04-13

## 2023-04-13 ENCOUNTER — FORM ENCOUNTER (OUTPATIENT)
Age: 88
End: 2023-04-13

## 2023-05-25 ENCOUNTER — APPOINTMENT (OUTPATIENT)
Dept: ELECTROPHYSIOLOGY | Facility: CLINIC | Age: 88
End: 2023-05-25

## 2023-06-21 ENCOUNTER — FORM ENCOUNTER (OUTPATIENT)
Age: 88
End: 2023-06-21

## 2023-06-21 ENCOUNTER — APPOINTMENT (OUTPATIENT)
Dept: ELECTROPHYSIOLOGY | Facility: CLINIC | Age: 88
End: 2023-06-21

## 2023-06-22 ENCOUNTER — FORM ENCOUNTER (OUTPATIENT)
Age: 88
End: 2023-06-22

## 2023-07-27 ENCOUNTER — APPOINTMENT (OUTPATIENT)
Dept: ELECTROPHYSIOLOGY | Facility: CLINIC | Age: 88
End: 2023-07-27

## 2023-08-15 NOTE — CONSULT NOTE ADULT - SUBJECTIVE AND OBJECTIVE BOX
Stable, continue current medication management    Patient is a 88y old  Male who presents with a chief complaint of Cadiopulmonary rehab s/p STEMI (05 Mar 2022 13:45)    HPI:  89 y/o man with PMHx of CAD s/p CABG (1999), PCI stents x 5 (2019), PVD with stent place to Right Posterior Tibial Artery? (2021), T2DM, HTN, HLD, CKD, presented with near syncope and chest pain.  Patient's son states he has been feeling generalized fatigue for past few weeks but this morning nearly collapsed when standing and developed chest pressure.  Called his cardiologist, Dr. Corona, who advised he go to ED for evaluation.  Pt continues to report chest discomfort and generalized fatigue.  Was due for dental appt this morning and has not taken asa for few days. In ED EKG showed ROCIO in aVR ,STD in I, aVL,V2 to V6 with 1st degree /LAFB/ RBBB. Received asa 325mg PO x1 , Brilinta 180mg PO x1 and heparin drip for ACS. Trop 143. Received insulin/dex 50% and mike gluconate IV and lokema  for K+ 5.9. He was taken to cath lab for ongoing chest discomfort.  Patient went urgently to cath lab for sten to SVG to OM1 with IABP placed. Patient had IABP removed and s/p Drug eluting stent, now on ASA81 and Brilinta. Downgraded from CCU. Patient also c/o difficulty closing bilateral hands 2/2 worsening arthritis. Seen by PM&R and deemed candidate for acute inpatient rehab. Pt admitted to [facility] for acute rehab on 3/5/22. (05 Mar 2022 13:45)      PAST MEDICAL & SURGICAL HISTORY:  Hyperlipemia    Hypertension    Coronary Artery Disease    Diabetes Mellitus Type II    Stented Coronary Artery  total 5 stents, last stent 5/2019    Neuropathy    Myocardial infarction    Stroke  mild left facial numbness   no other residuals verbalized    History of Cataract Extraction    Hx of CABG    H/O coronary angiogram    S/P coronary artery stent placement  1/6/09        Father: - at age - with history of   Mother: - at age - with history of           Substance Use (street drugs): (  ) never used  (  ) other:  Tobacco Usage:  (   ) never smoked   (   ) former smoker   (   ) current smoker  (     ) pack year  Alcohol Usage:  Sexual History:   Recent Travel:      Allergies    carbamazepine (Other)  Cipro (Unknown)  fluoroquinolone antibiotics (Other)  Tegretol (Unknown)    Intolerances        influenza  Vaccine (HIGH DOSE) 0.7 milliLiter(s) IntraMuscular once      REVIEW OF SYSTEMS:  CONSTITUTIONAL: No fever, weight loss, or fatigue  EYES: No eye pain, visual disturbances, or discharge  ENMT:  No difficulty hearing, tinnitus, vertigo; No sinus or throat pain  NECK: No pain or stiffness  BREASTS: No pain, masses, or nipple discharge  RESPIRATORY: No cough, wheezing, chills or hemoptysis; No shortness of breath  CARDIOVASCULAR: No chest pain, palpitations, dizziness, or leg swelling  GASTROINTESTINAL: No abdominal or epigastric pain. No nausea, vomiting, or hematemesis; No diarrhea or constipation. No melena or hematochezia.  GENITOURINARY: No dysuria, frequency, hematuria, or incontinence  NEUROLOGICAL: No headaches, memory loss, loss of strength, numbness, or tremors  SKIN: No itching, burning, rashes, or lesions   LYMPH NODES: No enlarged glands  ENDOCRINE: No heat or cold intolerance; No hair loss  MUSCULOSKELETAL: No joint pain or swelling; No muscle, back, or extremity pain  PSYCHIATRIC: No depression, anxiety, mood swings, or difficulty sleeping  HEME/LYMPH: No easy bruising, or bleeding gums  ALLERY AND IMMUNOLOGIC: No hives or eczema    ALL ROS REVIEWED AND NORMAL EXCEPT AS STATED ABOVE    T(C): 36.8 (03-05-22 @ 19:58), Max: 37.1 (03-05-22 @ 13:00)  HR: 75 (03-06-22 @ 05:30) (75 - 82)  BP: 131/70 (03-06-22 @ 05:30) (127/69 - 146/68)  RR: 16 (03-05-22 @ 19:58) (16 - 16)  SpO2: 96% (03-05-22 @ 19:58) (96% - 99%)  Wt(kg): --Vital Signs Last 24 Hrs  T(C): 36.8 (05 Mar 2022 19:58), Max: 37.1 (05 Mar 2022 13:00)  T(F): 98.2 (05 Mar 2022 19:58), Max: 98.7 (05 Mar 2022 13:00)  HR: 75 (06 Mar 2022 05:30) (75 - 82)  BP: 131/70 (06 Mar 2022 05:30) (127/69 - 146/68)  BP(mean): --  RR: 16 (05 Mar 2022 19:58) (16 - 16)  SpO2: 96% (05 Mar 2022 19:58) (96% - 99%)    PHYSICAL EXAM:  Gen - NAD, Comfortable  HEENT - NCAT, EOMI, dry tongue  Neck - Supple, No limited ROM  Pulm - CTAB  Cardiovascular - RRR, S1S2  Abdomen - Soft, NT/ND, +BS    LABS:                        12.7   7.95  )-----------( 207      ( 06 Mar 2022 05:40 )             38.4     03-06    139  |  106  |  37<H>  ----------------------------<  137<H>  4.4   |  24  |  1.72<H>    Ca    8.6      06 Mar 2022 05:40  Phos  3.9     03-05  Mg     2.30     03-05    TPro  7.1  /  Alb  3.3  /  TBili  0.5  /  DBili  x   /  AST  36  /  ALT  59<H>  /  AlkPhos  56  03-06         CAPILLARY BLOOD GLUCOSE  POCT Blood Glucose.: 225 mg/dL (06 Mar 2022 07:51)  POCT Blood Glucose.: 233 mg/dL (05 Mar 2022 21:56)  POCT Blood Glucose.: 291 mg/dL (05 Mar 2022 16:20)  POCT Blood Glucose.: 205 mg/dL (05 Mar 2022 12:51)      RADIOLOGY & ADDITIONAL TESTS:    Consultant(s) Notes Reviewed:  [x ] YES  [ ] NO  Care Discussed with Consultants/Other Providers [ x] YES  [ ] NO  Imaging Personally Reviewed:  [ ] YES  [ ] NO

## 2023-08-21 ENCOUNTER — APPOINTMENT (OUTPATIENT)
Dept: ELECTROPHYSIOLOGY | Facility: CLINIC | Age: 88
End: 2023-08-21

## 2023-08-23 NOTE — PROGRESS NOTE ADULT - PROBLEM SELECTOR PLAN 7
Patient reports that he's "had this cough for a while". Family member reports "everyone in the family has had a cough". Pt reports that family member who is a physician prescribed him a abx last week.   CXR clear.  RVP negative.  No fever, no white count.   2/2 post-nasal drip.  Symptomatic treatment.  - c/w tessalon perle  - c/w cepacol lozenge  - c/w flonase  - c/w robitussin  - c/w hycodan   - c/w duonebs q4hr  - c/w hypertonic saline Closure 2 Information: This tab is for additional flaps and grafts, including complex repair and grafts and complex repair and flaps. You can also specify a different location for the additional defect, if the location is the same you do not need to select a new one. We will insert the automated text for the repair you select below just as we do for solitary flaps and grafts. Please note that at this time if you select a location with a different insurance zone you will need to override the ICD10 and CPT if appropriate.

## 2023-08-25 ENCOUNTER — APPOINTMENT (OUTPATIENT)
Dept: ELECTROPHYSIOLOGY | Facility: CLINIC | Age: 88
End: 2023-08-25

## 2023-09-23 NOTE — H&P ADULT - PROBLEM SELECTOR PROBLEM 5
R shoulder no deformity, pain w abduction and flexion/extension 90 degrees, normal distal pulses and sensation.
Stroke

## 2023-12-23 ENCOUNTER — INPATIENT (INPATIENT)
Facility: HOSPITAL | Age: 88
LOS: 94 days | Discharge: NURSING, REHAB OR LONG-TERM CARE | End: 2024-03-27
Attending: INTERNAL MEDICINE | Admitting: INTERNAL MEDICINE
Payer: MEDICARE

## 2023-12-23 ENCOUNTER — TRANSCRIPTION ENCOUNTER (OUTPATIENT)
Age: 88
End: 2023-12-23

## 2023-12-23 VITALS
SYSTOLIC BLOOD PRESSURE: 171 MMHG | OXYGEN SATURATION: 100 % | TEMPERATURE: 98 F | HEART RATE: 80 BPM | DIASTOLIC BLOOD PRESSURE: 72 MMHG | RESPIRATION RATE: 18 BRPM

## 2023-12-23 DIAGNOSIS — U07.1 COVID-19: ICD-10-CM

## 2023-12-23 DIAGNOSIS — Z95.5 PRESENCE OF CORONARY ANGIOPLASTY IMPLANT AND GRAFT: Chronic | ICD-10-CM

## 2023-12-23 DIAGNOSIS — Z98.89 OTHER SPECIFIED POSTPROCEDURAL STATES: Chronic | ICD-10-CM

## 2023-12-23 LAB
ALBUMIN SERPL ELPH-MCNC: 3.8 G/DL — SIGNIFICANT CHANGE UP (ref 3.3–5)
ALBUMIN SERPL ELPH-MCNC: 3.8 G/DL — SIGNIFICANT CHANGE UP (ref 3.3–5)
ALP SERPL-CCNC: 51 U/L — SIGNIFICANT CHANGE UP (ref 40–120)
ALP SERPL-CCNC: 51 U/L — SIGNIFICANT CHANGE UP (ref 40–120)
ALT FLD-CCNC: 31 U/L — SIGNIFICANT CHANGE UP (ref 4–41)
ALT FLD-CCNC: 31 U/L — SIGNIFICANT CHANGE UP (ref 4–41)
ANION GAP SERPL CALC-SCNC: 15 MMOL/L — HIGH (ref 7–14)
ANION GAP SERPL CALC-SCNC: 15 MMOL/L — HIGH (ref 7–14)
APTT BLD: 31.5 SEC — SIGNIFICANT CHANGE UP (ref 24.5–35.6)
APTT BLD: 31.5 SEC — SIGNIFICANT CHANGE UP (ref 24.5–35.6)
AST SERPL-CCNC: 43 U/L — HIGH (ref 4–40)
AST SERPL-CCNC: 43 U/L — HIGH (ref 4–40)
BASE EXCESS BLDV CALC-SCNC: -2.2 MMOL/L — LOW (ref -2–3)
BASE EXCESS BLDV CALC-SCNC: -2.2 MMOL/L — LOW (ref -2–3)
BASOPHILS # BLD AUTO: 0 K/UL — SIGNIFICANT CHANGE UP (ref 0–0.2)
BASOPHILS # BLD AUTO: 0 K/UL — SIGNIFICANT CHANGE UP (ref 0–0.2)
BASOPHILS NFR BLD AUTO: 0 % — SIGNIFICANT CHANGE UP (ref 0–2)
BASOPHILS NFR BLD AUTO: 0 % — SIGNIFICANT CHANGE UP (ref 0–2)
BILIRUB SERPL-MCNC: 1.5 MG/DL — HIGH (ref 0.2–1.2)
BILIRUB SERPL-MCNC: 1.5 MG/DL — HIGH (ref 0.2–1.2)
BLD GP AB SCN SERPL QL: NEGATIVE — SIGNIFICANT CHANGE UP
BLD GP AB SCN SERPL QL: NEGATIVE — SIGNIFICANT CHANGE UP
BLOOD GAS VENOUS COMPREHENSIVE RESULT: SIGNIFICANT CHANGE UP
BLOOD GAS VENOUS COMPREHENSIVE RESULT: SIGNIFICANT CHANGE UP
BUN SERPL-MCNC: 35 MG/DL — HIGH (ref 7–23)
BUN SERPL-MCNC: 35 MG/DL — HIGH (ref 7–23)
BURR CELLS BLD QL SMEAR: PRESENT — SIGNIFICANT CHANGE UP
BURR CELLS BLD QL SMEAR: PRESENT — SIGNIFICANT CHANGE UP
CALCIUM SERPL-MCNC: 9 MG/DL — SIGNIFICANT CHANGE UP (ref 8.4–10.5)
CALCIUM SERPL-MCNC: 9 MG/DL — SIGNIFICANT CHANGE UP (ref 8.4–10.5)
CHLORIDE BLDV-SCNC: 98 MMOL/L — SIGNIFICANT CHANGE UP (ref 96–108)
CHLORIDE BLDV-SCNC: 98 MMOL/L — SIGNIFICANT CHANGE UP (ref 96–108)
CHLORIDE SERPL-SCNC: 96 MMOL/L — LOW (ref 98–107)
CHLORIDE SERPL-SCNC: 96 MMOL/L — LOW (ref 98–107)
CO2 BLDV-SCNC: 25.1 MMOL/L — SIGNIFICANT CHANGE UP (ref 22–26)
CO2 BLDV-SCNC: 25.1 MMOL/L — SIGNIFICANT CHANGE UP (ref 22–26)
CO2 SERPL-SCNC: 21 MMOL/L — LOW (ref 22–31)
CO2 SERPL-SCNC: 21 MMOL/L — LOW (ref 22–31)
CREAT SERPL-MCNC: 1.99 MG/DL — HIGH (ref 0.5–1.3)
CREAT SERPL-MCNC: 1.99 MG/DL — HIGH (ref 0.5–1.3)
DACRYOCYTES BLD QL SMEAR: SLIGHT — SIGNIFICANT CHANGE UP
DACRYOCYTES BLD QL SMEAR: SLIGHT — SIGNIFICANT CHANGE UP
EGFR: 31 ML/MIN/1.73M2 — LOW
EGFR: 31 ML/MIN/1.73M2 — LOW
ELLIPTOCYTES BLD QL SMEAR: SLIGHT — SIGNIFICANT CHANGE UP
ELLIPTOCYTES BLD QL SMEAR: SLIGHT — SIGNIFICANT CHANGE UP
EOSINOPHIL # BLD AUTO: 0 K/UL — SIGNIFICANT CHANGE UP (ref 0–0.5)
EOSINOPHIL # BLD AUTO: 0 K/UL — SIGNIFICANT CHANGE UP (ref 0–0.5)
EOSINOPHIL NFR BLD AUTO: 0 % — SIGNIFICANT CHANGE UP (ref 0–6)
EOSINOPHIL NFR BLD AUTO: 0 % — SIGNIFICANT CHANGE UP (ref 0–6)
FLUAV AG NPH QL: SIGNIFICANT CHANGE UP
FLUAV AG NPH QL: SIGNIFICANT CHANGE UP
FLUBV AG NPH QL: SIGNIFICANT CHANGE UP
FLUBV AG NPH QL: SIGNIFICANT CHANGE UP
GAS PNL BLDV: 128 MMOL/L — LOW (ref 136–145)
GAS PNL BLDV: 128 MMOL/L — LOW (ref 136–145)
GLUCOSE BLDC GLUCOMTR-MCNC: 221 MG/DL — HIGH (ref 70–99)
GLUCOSE BLDC GLUCOMTR-MCNC: 221 MG/DL — HIGH (ref 70–99)
GLUCOSE BLDC GLUCOMTR-MCNC: 227 MG/DL — HIGH (ref 70–99)
GLUCOSE BLDC GLUCOMTR-MCNC: 227 MG/DL — HIGH (ref 70–99)
GLUCOSE BLDV-MCNC: 285 MG/DL — HIGH (ref 70–99)
GLUCOSE BLDV-MCNC: 285 MG/DL — HIGH (ref 70–99)
GLUCOSE SERPL-MCNC: 280 MG/DL — HIGH (ref 70–99)
GLUCOSE SERPL-MCNC: 280 MG/DL — HIGH (ref 70–99)
HCO3 BLDV-SCNC: 24 MMOL/L — SIGNIFICANT CHANGE UP (ref 22–29)
HCO3 BLDV-SCNC: 24 MMOL/L — SIGNIFICANT CHANGE UP (ref 22–29)
HCT VFR BLD CALC: 38.2 % — LOW (ref 39–50)
HCT VFR BLD CALC: 38.2 % — LOW (ref 39–50)
HCT VFR BLDA CALC: 40 % — SIGNIFICANT CHANGE UP (ref 39–51)
HCT VFR BLDA CALC: 40 % — SIGNIFICANT CHANGE UP (ref 39–51)
HGB BLD CALC-MCNC: 13.3 G/DL — SIGNIFICANT CHANGE UP (ref 12.6–17.4)
HGB BLD CALC-MCNC: 13.3 G/DL — SIGNIFICANT CHANGE UP (ref 12.6–17.4)
HGB BLD-MCNC: 12.8 G/DL — LOW (ref 13–17)
HGB BLD-MCNC: 12.8 G/DL — LOW (ref 13–17)
HYPOCHROMIA BLD QL: SLIGHT — SIGNIFICANT CHANGE UP
HYPOCHROMIA BLD QL: SLIGHT — SIGNIFICANT CHANGE UP
IANC: 14.71 K/UL — HIGH (ref 1.8–7.4)
IANC: 14.71 K/UL — HIGH (ref 1.8–7.4)
INR BLD: 1.31 RATIO — HIGH (ref 0.85–1.18)
INR BLD: 1.31 RATIO — HIGH (ref 0.85–1.18)
LACTATE BLDV-MCNC: 2.3 MMOL/L — HIGH (ref 0.5–2)
LACTATE BLDV-MCNC: 2.3 MMOL/L — HIGH (ref 0.5–2)
LYMPHOCYTES # BLD AUTO: 0.47 K/UL — LOW (ref 1–3.3)
LYMPHOCYTES # BLD AUTO: 0.47 K/UL — LOW (ref 1–3.3)
LYMPHOCYTES # BLD AUTO: 2.6 % — LOW (ref 13–44)
LYMPHOCYTES # BLD AUTO: 2.6 % — LOW (ref 13–44)
MAGNESIUM SERPL-MCNC: 2 MG/DL — SIGNIFICANT CHANGE UP (ref 1.6–2.6)
MAGNESIUM SERPL-MCNC: 2 MG/DL — SIGNIFICANT CHANGE UP (ref 1.6–2.6)
MCHC RBC-ENTMCNC: 30.7 PG — SIGNIFICANT CHANGE UP (ref 27–34)
MCHC RBC-ENTMCNC: 30.7 PG — SIGNIFICANT CHANGE UP (ref 27–34)
MCHC RBC-ENTMCNC: 33.5 GM/DL — SIGNIFICANT CHANGE UP (ref 32–36)
MCHC RBC-ENTMCNC: 33.5 GM/DL — SIGNIFICANT CHANGE UP (ref 32–36)
MCV RBC AUTO: 91.6 FL — SIGNIFICANT CHANGE UP (ref 80–100)
MCV RBC AUTO: 91.6 FL — SIGNIFICANT CHANGE UP (ref 80–100)
MONOCYTES # BLD AUTO: 1.26 K/UL — HIGH (ref 0–0.9)
MONOCYTES # BLD AUTO: 1.26 K/UL — HIGH (ref 0–0.9)
MONOCYTES NFR BLD AUTO: 7 % — SIGNIFICANT CHANGE UP (ref 2–14)
MONOCYTES NFR BLD AUTO: 7 % — SIGNIFICANT CHANGE UP (ref 2–14)
MYELOCYTES NFR BLD: 0.9 % — HIGH (ref 0–0)
MYELOCYTES NFR BLD: 0.9 % — HIGH (ref 0–0)
NEUTROPHILS # BLD AUTO: 15.49 K/UL — HIGH (ref 1.8–7.4)
NEUTROPHILS # BLD AUTO: 15.49 K/UL — HIGH (ref 1.8–7.4)
NEUTROPHILS NFR BLD AUTO: 79.8 % — HIGH (ref 43–77)
NEUTROPHILS NFR BLD AUTO: 79.8 % — HIGH (ref 43–77)
NEUTS BAND # BLD: 6.2 % — HIGH (ref 0–6)
NEUTS BAND # BLD: 6.2 % — HIGH (ref 0–6)
OVALOCYTES BLD QL SMEAR: SLIGHT — SIGNIFICANT CHANGE UP
OVALOCYTES BLD QL SMEAR: SLIGHT — SIGNIFICANT CHANGE UP
PCO2 BLDV: 44 MMHG — SIGNIFICANT CHANGE UP (ref 42–55)
PCO2 BLDV: 44 MMHG — SIGNIFICANT CHANGE UP (ref 42–55)
PH BLDV: 7.34 — SIGNIFICANT CHANGE UP (ref 7.32–7.43)
PH BLDV: 7.34 — SIGNIFICANT CHANGE UP (ref 7.32–7.43)
PLAT MORPH BLD: NORMAL — SIGNIFICANT CHANGE UP
PLAT MORPH BLD: NORMAL — SIGNIFICANT CHANGE UP
PLATELET # BLD AUTO: 196 K/UL — SIGNIFICANT CHANGE UP (ref 150–400)
PLATELET # BLD AUTO: 196 K/UL — SIGNIFICANT CHANGE UP (ref 150–400)
PLATELET COUNT - ESTIMATE: NORMAL — SIGNIFICANT CHANGE UP
PLATELET COUNT - ESTIMATE: NORMAL — SIGNIFICANT CHANGE UP
PO2 BLDV: 28 MMHG — SIGNIFICANT CHANGE UP (ref 25–45)
PO2 BLDV: 28 MMHG — SIGNIFICANT CHANGE UP (ref 25–45)
POIKILOCYTOSIS BLD QL AUTO: SLIGHT — SIGNIFICANT CHANGE UP
POIKILOCYTOSIS BLD QL AUTO: SLIGHT — SIGNIFICANT CHANGE UP
POLYCHROMASIA BLD QL SMEAR: SLIGHT — SIGNIFICANT CHANGE UP
POLYCHROMASIA BLD QL SMEAR: SLIGHT — SIGNIFICANT CHANGE UP
POTASSIUM BLDV-SCNC: 5.2 MMOL/L — HIGH (ref 3.5–5.1)
POTASSIUM BLDV-SCNC: 5.2 MMOL/L — HIGH (ref 3.5–5.1)
POTASSIUM SERPL-MCNC: 4.8 MMOL/L — SIGNIFICANT CHANGE UP (ref 3.5–5.3)
POTASSIUM SERPL-MCNC: 4.8 MMOL/L — SIGNIFICANT CHANGE UP (ref 3.5–5.3)
POTASSIUM SERPL-SCNC: 4.8 MMOL/L — SIGNIFICANT CHANGE UP (ref 3.5–5.3)
POTASSIUM SERPL-SCNC: 4.8 MMOL/L — SIGNIFICANT CHANGE UP (ref 3.5–5.3)
PROT SERPL-MCNC: 7.8 G/DL — SIGNIFICANT CHANGE UP (ref 6–8.3)
PROT SERPL-MCNC: 7.8 G/DL — SIGNIFICANT CHANGE UP (ref 6–8.3)
PROTHROM AB SERPL-ACNC: 14.7 SEC — HIGH (ref 9.5–13)
PROTHROM AB SERPL-ACNC: 14.7 SEC — HIGH (ref 9.5–13)
RBC # BLD: 4.17 M/UL — LOW (ref 4.2–5.8)
RBC # BLD: 4.17 M/UL — LOW (ref 4.2–5.8)
RBC # FLD: 13.9 % — SIGNIFICANT CHANGE UP (ref 10.3–14.5)
RBC # FLD: 13.9 % — SIGNIFICANT CHANGE UP (ref 10.3–14.5)
RBC BLD AUTO: ABNORMAL
RBC BLD AUTO: ABNORMAL
RH IG SCN BLD-IMP: POSITIVE — SIGNIFICANT CHANGE UP
RH IG SCN BLD-IMP: POSITIVE — SIGNIFICANT CHANGE UP
RSV RNA NPH QL NAA+NON-PROBE: SIGNIFICANT CHANGE UP
RSV RNA NPH QL NAA+NON-PROBE: SIGNIFICANT CHANGE UP
SAO2 % BLDV: 20.5 % — LOW (ref 67–88)
SAO2 % BLDV: 20.5 % — LOW (ref 67–88)
SARS-COV-2 RNA SPEC QL NAA+PROBE: DETECTED
SARS-COV-2 RNA SPEC QL NAA+PROBE: DETECTED
SCHISTOCYTES BLD QL AUTO: SLIGHT — SIGNIFICANT CHANGE UP
SCHISTOCYTES BLD QL AUTO: SLIGHT — SIGNIFICANT CHANGE UP
SODIUM SERPL-SCNC: 132 MMOL/L — LOW (ref 135–145)
SODIUM SERPL-SCNC: 132 MMOL/L — LOW (ref 135–145)
TARGETS BLD QL SMEAR: SLIGHT — SIGNIFICANT CHANGE UP
TARGETS BLD QL SMEAR: SLIGHT — SIGNIFICANT CHANGE UP
TROPONIN T, HIGH SENSITIVITY RESULT: 45 NG/L — SIGNIFICANT CHANGE UP
TROPONIN T, HIGH SENSITIVITY RESULT: 45 NG/L — SIGNIFICANT CHANGE UP
VARIANT LYMPHS # BLD: 3.5 % — SIGNIFICANT CHANGE UP (ref 0–6)
VARIANT LYMPHS # BLD: 3.5 % — SIGNIFICANT CHANGE UP (ref 0–6)
WBC # BLD: 18.01 K/UL — HIGH (ref 3.8–10.5)
WBC # BLD: 18.01 K/UL — HIGH (ref 3.8–10.5)
WBC # FLD AUTO: 18.01 K/UL — HIGH (ref 3.8–10.5)
WBC # FLD AUTO: 18.01 K/UL — HIGH (ref 3.8–10.5)

## 2023-12-23 PROCEDURE — 99223 1ST HOSP IP/OBS HIGH 75: CPT

## 2023-12-23 PROCEDURE — G0316 PROLONG INPT EVAL ADD15 M: CPT

## 2023-12-23 PROCEDURE — 71045 X-RAY EXAM CHEST 1 VIEW: CPT | Mod: 26

## 2023-12-23 PROCEDURE — 99285 EMERGENCY DEPT VISIT HI MDM: CPT

## 2023-12-23 RX ORDER — IPRATROPIUM/ALBUTEROL SULFATE 18-103MCG
3 AEROSOL WITH ADAPTER (GRAM) INHALATION ONCE
Refills: 0 | Status: COMPLETED | OUTPATIENT
Start: 2023-12-23 | End: 2023-12-23

## 2023-12-23 RX ORDER — SODIUM CHLORIDE 9 MG/ML
1000 INJECTION INTRAMUSCULAR; INTRAVENOUS; SUBCUTANEOUS ONCE
Refills: 0 | Status: COMPLETED | OUTPATIENT
Start: 2023-12-23 | End: 2023-12-23

## 2023-12-23 RX ORDER — ACETAMINOPHEN 500 MG
1000 TABLET ORAL ONCE
Refills: 0 | Status: COMPLETED | OUTPATIENT
Start: 2023-12-23 | End: 2023-12-23

## 2023-12-23 RX ADMIN — Medication 200 MILLIGRAM(S): at 20:49

## 2023-12-23 RX ADMIN — Medication 3 MILLILITER(S): at 15:53

## 2023-12-23 RX ADMIN — SODIUM CHLORIDE 1000 MILLILITER(S): 9 INJECTION INTRAMUSCULAR; INTRAVENOUS; SUBCUTANEOUS at 15:52

## 2023-12-23 RX ADMIN — Medication 1000 MILLIGRAM(S): at 16:23

## 2023-12-23 RX ADMIN — Medication 400 MILLIGRAM(S): at 15:53

## 2023-12-23 NOTE — H&P ADULT - NSHPLABSRESULTS_GEN_ALL_CORE
LABS and ADDITIONAL STUDIES:                        12.8   18.01 )-----------( 196      ( 23 Dec 2023 15:49 )             38.2   Complete Blood Count + Automated Diff (12.23.23 @ 15:49)   Auto Neutrophil #: 15.49 K/uL  Auto Neutrophil %: 79.8 %  Manual Differential (12.23.23 @ 15:49)   Band Neutrophils %: 6.2 %    134<L>  |  100  |  36<H>  ----------------------------<  222<H>     12-24  4.2   |  21<L>  |  1.77<H>    Ca    8.5      24 Dec 2023 00:35  Phos  2.5     12-24  Mg     2.00     12-24    TPro  7.8  /  Alb  3.8  /  TBili  1.5<H>  /  DBili  x   /  AST  43<H>  /  ALT  31  /  AlkPhos  51  12-23    PT/INR: 14.7/1.31 (12-23-23 @ 15:49)  PTT: 31.5 (12-23-23 @ 15:49)    15:49 - VBG - pH: 7.34  | pCO2: 44    | pO2: 28    | Lactate: 2.3      hs Troponin, T - 45 ng/L (12-24-23 @ 00:35)  hs Troponin, T - 45 ng/L (12-23-23 @ 15:49)    < from: Xray Chest 1 View- PORTABLE-Urgent (12.23.23 @ 16:46) >  ******PRELIMINARY REPORT******   INTERPRETATION:  no focal consolidations  bibasilar atelectasis  < end of copied text >    EKG - NSR with PACs and 1 PVC, 1st degree AV block (present on prior), rate 84, , ,  QTc 496, RBBB (present on prior EKG), no significant ST-T wave changes; repeat EKG with similar findings

## 2023-12-23 NOTE — ED ADULT TRIAGE NOTE - LOCATION:
Subjective if complaint is follow-up for Saeed on blood pressure  Taiwo Parisi is a 34 y.o. male.     History of Present Illness   Taiwo is here today for follow-up.  He is on atorvastatin 20 mg daily for his cholesterol.  He is not having any myalgias with this.  He also has been started on lisinopril 10 mg daily.  His blood pressure today seems to be a little bit better.  He had a episode 2 days ago where he had a headache and some dizziness.  It was very stressful day at work.  He was almost involved in several motor vehicle accidents.  The dizziness lasted for approximately 10 minutes.  The headache lasted for approximately one day.  The headache has resolved.  He had no other neurologic symptoms in terms of focal weakness or numbness.  He did not have any speech disturbance.  The following portions of the patient's history were reviewed and updated as appropriate: allergies, current medications, past medical history and problem list.    Review of Systems   Respiratory: Negative for cough, chest tightness and shortness of breath.    Cardiovascular: Negative for chest pain.   Musculoskeletal: Negative for myalgias.   Neurological: Positive for dizziness and headache.       Objective   Physical Exam   Constitutional: He is oriented to person, place, and time. He appears well-developed and well-nourished.   HENT:   Head: Normocephalic and atraumatic.   Neck: Normal range of motion. Neck supple. No thyromegaly present.   Cardiovascular: Normal rate, regular rhythm and normal heart sounds.    Blood pressure is 124/86 to my exam   Pulmonary/Chest: Effort normal and breath sounds normal.   Neurological: He is alert and oriented to person, place, and time. No cranial nerve deficit. He exhibits normal muscle tone. Coordination normal.   Nursing note and vitals reviewed.        Assessment/Plan   Taiwo was seen today for hyperlipidemia.    Diagnoses and all orders for this visit:    Essential hypertension    Mixed  hyperlipidemia  -     Lipid Panel  -     Hepatic Function Panel  -     CK    Acute nonintractable headache, unspecified headache type    Immunity status testing  -     Hepatitis B Surf Antibody Quant    Other orders  -     lisinopril (PRINIVIL,ZESTRIL) 20 MG tablet; Take 1 tablet by mouth Daily.     Taiwo is here today for follow-up.  We are going to check on status of his cholesterol today.  For his blood pressure I am going to go up to 20 mg of lisinopril.  He is going to contact me if he develops a cough.  We'll also contact the office if he has any further headache.  I suspect his headache may have been from some elevated blood pressure.  It seems to resolve with no neurologic sequela.  However if this occurs again we may need to scan his head.  We are going to check on status of his immunity to hepatitis B.            Left arm;

## 2023-12-23 NOTE — H&P ADULT - PROBLEM SELECTOR PROBLEM 4
Type 2 diabetes mellitus with hyperglycemia Acute renal failure superimposed on chronic kidney disease

## 2023-12-23 NOTE — H&P ADULT - HISTORY OF PRESENT ILLNESS
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 Patient states that he has been having a dry cough for the past week, worsened over the past few days. Denies SOB with the cough, denies hemoptysis. Reports fevers at home to 101.5 with associated diaphoresis. Said that he has significant pinprick like b/l chest pain with his cough but denies any chest pain when not coughing or with ambulation. Also reports rhinorrhea and sore throat. Reports generalized weakness and poor appetite. States his daughter was visiting him over the week end last week and she was positive for COVID19. Patient tested positive for COVID19 2 days prior and was started on paxlovid as outpatient. Of note, family states that the patient has had worsening confusion at home over the past 6 months (becoming disoriented to time) but recently has been more confused, talking about seeing people that are not present.     On arrival to the ED his vitals were T 98 -> 99.2, P 80, /72, RR 18, ) sat 100% RA. His lab work showed leukocytosis with neutrophilia and bandemia, MABLE, mild hyponatremia, indeterminant but stable troponins, and elevated lactate to 2.3. His COVID19 swab was positive. His CXR showed bibasilar crackles.

## 2023-12-23 NOTE — ED PROVIDER NOTE - OBJECTIVE STATEMENT
89y/o M with pmhx of CAD s/p CABG, stent recently in 5/2022, HTN, DM2, CKD, PVD with stent Presenting to the ED for increased weakness, cough, congestion.  Found to have COVID 3 days ago and started on Paxlovid by his PMD.  Patient complaining of right rib pain after coughing and has been having trouble walking due to   Generalized weakness but no focal lower extremity weakness.  No chest pain but having shortness of breath from coughing

## 2023-12-23 NOTE — H&P ADULT - ASSESSMENT
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

## 2023-12-23 NOTE — ED PROVIDER NOTE - CLINICAL SUMMARY MEDICAL DECISION MAKING FREE TEXT BOX
89y/o M with pmhx of CAD s/p CABG, stent recently in 5/2022, HTN, DM2, CKD, PVD with stent Presenting to the ED for increased weakness, cough, congestion.  Found to have COVID 3 days ago and started on Paxlovid by his PMD.  Patient complaining of right rib pain after coughing and has been having trouble walking due to   Generalized weakness but no focal lower extremity weakness.  No chest pain but having shortness of breath from coughing. 89y/o M with pmhx of CAD s/p CABG, stent recently in 5/2022, HTN, DM2, CKD, PVD with stent Presenting to the ED for increased weakness, cough, congestion.  Found to have COVID 3 days ago and started on Paxlovid by his PMD.  Patient complaining of right rib pain after coughing and has been having trouble walking due to   Generalized weakness but no focal lower extremity weakness.  No chest pain but having shortness of breath from coughing.

## 2023-12-23 NOTE — H&P ADULT - PROBLEM SELECTOR PLAN 1
- Patient COVID19 positive with complaints of dry cough, URI symptoms, fevers to 101.5 at home, generalized malaise with poor oral intake, and diarrhea  - Here COVID19 positive, CXR with bibasilar atelectasis but no consolidation, saturating well on RA  - Given MABLE will hold off on remdesivir for now, no current indication for dexamethasone as patient saturating well on RA  - Monitor COVID19 inflammatory labs  - Monitor respiratory status, monitor fever curve  - Has bibasilar crackles on

## 2023-12-23 NOTE — ED PROVIDER NOTE - PHYSICAL EXAMINATION
Const: Well-nourished, Well-developed, appearing stated age.  Eyes: no conjunctival injection, and symmetrical lids.  HEENT: Head NCAT, no lesions. Atraumatic external nose and ears. Moist MM.  Neck: Symmetric, trachea midline.   CVS: +S1/S2   RESP: Unlabored respiratory effort   GI: Nontender/Nondistended   MSK: Normocephalic/Atraumatic, Lower Extremities w/o calf tenderness or edema b/l.   Skin: Warm, dry and intact.   Neuro: CNs II-XII grossly intact. Motor & Sensation grossly intact.  Psych: Awake, Alert, & Oriented (AAO) x3. Appropriate mood and affect.

## 2023-12-23 NOTE — H&P ADULT - NSHPPHYSICALEXAM_GEN_ALL_CORE
Vital Signs Last 24 Hrs  T(C): 37.3 (23 Dec 2023 22:17), Max: 37.3 (23 Dec 2023 20:31)  T(F): 99.2 (23 Dec 2023 22:17), Max: 99.2 (23 Dec 2023 20:31)  HR: 84 (23 Dec 2023 22:17) (80 - 87)  BP: 155/61 (23 Dec 2023 22:17) (149/58 - 171/72)  BP(mean): --  RR: 22 (23 Dec 2023 22:17) (18 - 22)  SpO2: 99% (23 Dec 2023 22:17) (99% - 100%)    Parameters below as of 23 Dec 2023 22:17  Patient On (Oxygen Delivery Method): room air    GENERAL: No acute distress, well-developed  EYES: EOMI, PERRL, conjunctiva and sclera clear  ENT: Neck supple, No JVD, moist mucosa  CHEST/LUNG: Clear to auscultation bilaterally; No wheeze, equal breath sounds bilaterally   BACK: No spinal tenderness  HEART: Regular rate and rhythm; No murmurs, rubs, or gallops  ABDOMEN: Soft, +tenderness to palpation to epigastrium and R flank, Nondistended; Bowel sounds present  EXTREMITIES: +DP/PT/Radial pulses, No clubbing, cyanosis, or edema  PSYCH: Nl behavior, nl affect  NEUROLOGY: AAOx2 (to self and place, not to time), non-focal  SKIN: Normal color, No rashes or lesions Vital Signs Last 24 Hrs  T(C): 37.3 (23 Dec 2023 22:17), Max: 37.3 (23 Dec 2023 20:31)  T(F): 99.2 (23 Dec 2023 22:17), Max: 99.2 (23 Dec 2023 20:31)  HR: 84 (23 Dec 2023 22:17) (80 - 87)  BP: 155/61 (23 Dec 2023 22:17) (149/58 - 171/72)  BP(mean): --  RR: 22 (23 Dec 2023 22:17) (18 - 22)  SpO2: 99% (23 Dec 2023 22:17) (99% - 100%)    Parameters below as of 23 Dec 2023 22:17  Patient On (Oxygen Delivery Method): room air    GENERAL: No acute distress, well-developed  EYES: EOMI, PERRL, conjunctiva and sclera clear  ENT: Neck supple, No JVD, moist mucosa  CHEST/LUNG: +Bibasilar crackles, no wheezing, good air entry b/l  BACK: No spinal tenderness  HEART: Regular rate and rhythm; No murmurs, rubs, or gallops  ABDOMEN: Soft, +tenderness to palpation to epigastrium and R flank, Nondistended; Bowel sounds present  EXTREMITIES: +DP/PT/Radial pulses, No clubbing, cyanosis, or edema  PSYCH: Nl behavior, nl affect  NEUROLOGY: AAOx2 (to self and place, not to time), non-focal  SKIN: Normal color, No rashes or lesions

## 2023-12-23 NOTE — H&P ADULT - PROBLEM SELECTOR PLAN 6
- Restart home insulin therapy but at decreased dosing -> lantus 18U qHS and admelog 2U qAC  - Monitor FS, low dose ISS qAC, CC diet

## 2023-12-23 NOTE — H&P ADULT - NSICDXPASTSURGICALHX_GEN_ALL_CORE_FT
PAST SURGICAL HISTORY:  H/O coronary angiogram     History of Cataract Extraction     Hx of CABG     S/P coronary artery stent placement 1/6/09    S/P placement of cardiac pacemaker

## 2023-12-23 NOTE — ED ADULT NURSE REASSESSMENT NOTE - NS ED NURSE REASSESS COMMENT FT1
pt endorses relief to respiratory symptoms  and chest pain after nebulizer treatment and medication. Breathing even, unlabored. Safety maintained.

## 2023-12-23 NOTE — H&P ADULT - PROBLEM SELECTOR PLAN 3
- New onset chest pain, bilateral, pinprick sensation, only with coughing no chest pain when not coughing, no chest pain with exertion  - Patient denies dizziness, palpitations, syncope  - Troponin here indeterminant but stable compared to prior levels  - EKG non-ischemic  - Given history of CAD with recent stent in May 2022 will monitor on telemetry for now, repeat troponin in AM, check TTE, check TSH, lipid profile, A1C in AM  - Cards eval in AM (as per primary team)  - Of note, patient's family state that patient had a history of a rib fracture a few years ago and were worried his chest pain could be 2/2 rib fracture, currently no TTP on palpation of the chest wall but has TTP of the R flank and epigastrium -> would check a CT C/A/P for further eval, non-com given his MABLE on AKD

## 2023-12-23 NOTE — ED ADULT NURSE NOTE - NSFALLRISKINTERV_ED_ALL_ED
Assistance with ambulation/Communicate fall risk and risk factors to all staff, patient, and family/Monitor gait and stability/Provide patient with walking aids/Provide visual cue: yellow wristband, yellow gown, etc/Reinforce activity limits and safety measures with patient and family/Call bell, personal items and telephone in reach/Instruct patient to call for assistance before getting out of bed/chair/stretcher/Non-slip footwear applied when patient is off stretcher/[place] to call system/Physically safe environment - no spills, clutter or unnecessary equipment/Purposeful Proactive Rounding/Room/bathroom lighting operational, light cord in reach Assistance with ambulation/Communicate fall risk and risk factors to all staff, patient, and family/Monitor gait and stability/Provide patient with walking aids/Provide visual cue: yellow wristband, yellow gown, etc/Reinforce activity limits and safety measures with patient and family/Call bell, personal items and telephone in reach/Instruct patient to call for assistance before getting out of bed/chair/stretcher/Non-slip footwear applied when patient is off stretcher/[place] to call system/Physically safe environment - no spills, clutter or unnecessary equipment/Purposeful Proactive Rounding/Room/bathroom lighting operational, light cord in reach

## 2023-12-23 NOTE — ED ADULT NURSE NOTE - OBJECTIVE STATEMENT
Patient received rm 18, a&ox3, accompanied by son. c/o chest pain and pain to Right sided rib cage x1 day. COVID+x3 days. Hx DM, stents, stroke, MI. Pt denies sob, N+V, headache, dizziness, fever, chills. Breathing even, unlabored; wheezing audible. 20g IV placed to tight ac, labs collected and sent. Pt medicated as per MAR.

## 2023-12-23 NOTE — H&P ADULT - NSICDXPASTMEDICALHX_GEN_ALL_CORE_FT
PAST MEDICAL HISTORY:  Coronary Artery Disease     Diabetes Mellitus Type II     Hyperlipemia     Hypertension     Myocardial infarction     Myoclonic jerking     Neuropathy     Stage 3 chronic kidney disease     Stented Coronary Artery total 5 stents, last stent 5/2019    Stroke mild left facial numbness   no other residuals verbalized

## 2023-12-23 NOTE — H&P ADULT - PROBLEM SELECTOR PLAN 4
- Baseline creatinine 1.2 - 1.3 as per family  - Currently has Cr 1.99 -> 1.77  - Likely 2/2 sepsis 2/2 COVID 19 with increased insensible losses and poor oral intake  - Will place on additional LR at 100 cc/hr fr 10 hrs  - Check UA, check urine sodium/creatinine ratio  - Trend BUN/Cr  - Condom catheter to allow for I/O monitoring  - Consider nephrology eval if his MABLE is not improving

## 2023-12-23 NOTE — H&P ADULT - PROBLEM SELECTOR PLAN 5
- Family reports patient with worsening memory over the past ~6 months, now worsened acutely over the past few days  - Here on examination is AAO x2 (to self and place, not to time) which seems to be his new baseline over the past 6  months  - Likely has worsening confusion 2/2 age and sepsis with COVID19  - Likely outpatient follow up with his PCP for evaluation of his worsening memory issues

## 2023-12-23 NOTE — ED PROVIDER NOTE - NS_EDPROVIDERDISPOUSERTYPE_ED_A_ED
Spine appears normal, range of motion is not limited, no muscle or joint tenderness
Attending Attestation (For Attendings USE Only)...

## 2023-12-23 NOTE — H&P ADULT - NSHPREVIEWOFSYSTEMS_GEN_ALL_CORE
REVIEW OF SYSTEMS:    CONSTITUTIONAL: +Generalized weakness, +Poor appetite, +fevers/chills  EYES: No visual changes or eye discharge  ENT: +rhinorrhea/sore throat  NECK: No pain or stiffness  RESPIRATORY: +cough, No wheezing, No hemoptysis; No shortness of breath  CARDIOVASCULAR: +Bilateral pleuritic chest pain, No palpitations; No lower extremity edema  GASTROINTESTINAL: No abdominal or epigastric pain. No nausea, vomiting, or hematemesis; +diarrhea, No constipation. No melena or hematochezia.  BACK: No back pain  GENITOURINARY: No dysuria, frequency or hematuria  NEUROLOGICAL: No numbness or weakness  SKIN: No itching, burning, rashes, or lesions

## 2023-12-23 NOTE — H&P ADULT - PROBLEM SELECTOR PLAN 12
DVT ppx - Lovenox (currently dose reduced in setting of renal function)  Diet - DASH/CC diet  Activity - OOB with assistance, increase as tolerated, PT eval    Fall and aspiration precautions

## 2023-12-23 NOTE — ED ADULT NURSE REASSESSMENT NOTE - NS ED NURSE REASSESS COMMENT FT1
Report given to ANNY Leger. Pt a&ox3, denying chest pain, sob, n+v, headache, dizziness, cough at this time. Breathing even, unlabored. Pending transport.

## 2023-12-23 NOTE — H&P ADULT - NSHPLANGTRANSLATORFT_GEN_A_CORE
Provider speaks and understands Kiara, Son Prateek Estrella also at bedside to help with translation

## 2023-12-23 NOTE — H&P ADULT - PROBLEM SELECTOR PLAN 2
- Meets sepsis criteria with leukocytosis, elevated respiratory rate, COVID19+ swap  - Lactate mildly elevated, s/p 1L in ED -> repeat lactate in AM  - No complaints of  issues -> will hold off on UCx for now  - Given bandemia would check BCx x2  - Diarrhea also likely 2/2 COVID19, monitor for additional episodes  - Trend leukocytosis/bandemia, monitor fever curve, monitor respiratory rate

## 2023-12-24 ENCOUNTER — TRANSCRIPTION ENCOUNTER (OUTPATIENT)
Age: 88
End: 2023-12-24

## 2023-12-24 DIAGNOSIS — I25.10 ATHEROSCLEROTIC HEART DISEASE OF NATIVE CORONARY ARTERY WITHOUT ANGINA PECTORIS: ICD-10-CM

## 2023-12-24 DIAGNOSIS — Z95.0 PRESENCE OF CARDIAC PACEMAKER: Chronic | ICD-10-CM

## 2023-12-24 DIAGNOSIS — A41.9 SEPSIS, UNSPECIFIED ORGANISM: ICD-10-CM

## 2023-12-24 DIAGNOSIS — I10 ESSENTIAL (PRIMARY) HYPERTENSION: ICD-10-CM

## 2023-12-24 DIAGNOSIS — Z29.9 ENCOUNTER FOR PROPHYLACTIC MEASURES, UNSPECIFIED: ICD-10-CM

## 2023-12-24 DIAGNOSIS — E78.5 HYPERLIPIDEMIA, UNSPECIFIED: ICD-10-CM

## 2023-12-24 DIAGNOSIS — R07.9 CHEST PAIN, UNSPECIFIED: ICD-10-CM

## 2023-12-24 DIAGNOSIS — G92.8 OTHER TOXIC ENCEPHALOPATHY: ICD-10-CM

## 2023-12-24 DIAGNOSIS — E11.65 TYPE 2 DIABETES MELLITUS WITH HYPERGLYCEMIA: ICD-10-CM

## 2023-12-24 DIAGNOSIS — N17.9 ACUTE KIDNEY FAILURE, UNSPECIFIED: ICD-10-CM

## 2023-12-24 DIAGNOSIS — G25.3 MYOCLONUS: ICD-10-CM

## 2023-12-24 DIAGNOSIS — Z86.73 PERSONAL HISTORY OF TRANSIENT ISCHEMIC ATTACK (TIA), AND CEREBRAL INFARCTION WITHOUT RESIDUAL DEFICITS: ICD-10-CM

## 2023-12-24 DIAGNOSIS — U07.1 COVID-19: ICD-10-CM

## 2023-12-24 LAB
A1C WITH ESTIMATED AVERAGE GLUCOSE RESULT: 9.3 % — HIGH (ref 4–5.6)
A1C WITH ESTIMATED AVERAGE GLUCOSE RESULT: 9.3 % — HIGH (ref 4–5.6)
ALBUMIN SERPL ELPH-MCNC: 3 G/DL — LOW (ref 3.3–5)
ALBUMIN SERPL ELPH-MCNC: 3 G/DL — LOW (ref 3.3–5)
ALBUMIN SERPL ELPH-MCNC: 3.3 G/DL — SIGNIFICANT CHANGE UP (ref 3.3–5)
ALBUMIN SERPL ELPH-MCNC: 3.3 G/DL — SIGNIFICANT CHANGE UP (ref 3.3–5)
ALBUMIN SERPL ELPH-MCNC: 3.4 G/DL — SIGNIFICANT CHANGE UP (ref 3.3–5)
ALBUMIN SERPL ELPH-MCNC: 3.4 G/DL — SIGNIFICANT CHANGE UP (ref 3.3–5)
ALP SERPL-CCNC: 46 U/L — SIGNIFICANT CHANGE UP (ref 40–120)
ALP SERPL-CCNC: 46 U/L — SIGNIFICANT CHANGE UP (ref 40–120)
ALP SERPL-CCNC: 57 U/L — SIGNIFICANT CHANGE UP (ref 40–120)
ALP SERPL-CCNC: 57 U/L — SIGNIFICANT CHANGE UP (ref 40–120)
ALP SERPL-CCNC: 58 U/L — SIGNIFICANT CHANGE UP (ref 40–120)
ALP SERPL-CCNC: 58 U/L — SIGNIFICANT CHANGE UP (ref 40–120)
ALT FLD-CCNC: 25 U/L — SIGNIFICANT CHANGE UP (ref 4–41)
ALT FLD-CCNC: 25 U/L — SIGNIFICANT CHANGE UP (ref 4–41)
ALT FLD-CCNC: 32 U/L — SIGNIFICANT CHANGE UP (ref 4–41)
ALT FLD-CCNC: 32 U/L — SIGNIFICANT CHANGE UP (ref 4–41)
ALT FLD-CCNC: 33 U/L — SIGNIFICANT CHANGE UP (ref 4–41)
ALT FLD-CCNC: 33 U/L — SIGNIFICANT CHANGE UP (ref 4–41)
ANION GAP SERPL CALC-SCNC: 13 MMOL/L — SIGNIFICANT CHANGE UP (ref 7–14)
ANION GAP SERPL CALC-SCNC: 14 MMOL/L — SIGNIFICANT CHANGE UP (ref 7–14)
APPEARANCE UR: ABNORMAL
APPEARANCE UR: ABNORMAL
AST SERPL-CCNC: 36 U/L — SIGNIFICANT CHANGE UP (ref 4–40)
AST SERPL-CCNC: 36 U/L — SIGNIFICANT CHANGE UP (ref 4–40)
AST SERPL-CCNC: 39 U/L — SIGNIFICANT CHANGE UP (ref 4–40)
AST SERPL-CCNC: 39 U/L — SIGNIFICANT CHANGE UP (ref 4–40)
AST SERPL-CCNC: 45 U/L — HIGH (ref 4–40)
AST SERPL-CCNC: 45 U/L — HIGH (ref 4–40)
BASE EXCESS BLDV CALC-SCNC: -2.9 MMOL/L — LOW (ref -2–3)
BASE EXCESS BLDV CALC-SCNC: -2.9 MMOL/L — LOW (ref -2–3)
BASOPHILS # BLD AUTO: 0.03 K/UL — SIGNIFICANT CHANGE UP (ref 0–0.2)
BASOPHILS # BLD AUTO: 0.03 K/UL — SIGNIFICANT CHANGE UP (ref 0–0.2)
BASOPHILS NFR BLD AUTO: 0.2 % — SIGNIFICANT CHANGE UP (ref 0–2)
BASOPHILS NFR BLD AUTO: 0.2 % — SIGNIFICANT CHANGE UP (ref 0–2)
BILIRUB SERPL-MCNC: 1 MG/DL — SIGNIFICANT CHANGE UP (ref 0.2–1.2)
BILIRUB SERPL-MCNC: 1.1 MG/DL — SIGNIFICANT CHANGE UP (ref 0.2–1.2)
BILIRUB SERPL-MCNC: 1.1 MG/DL — SIGNIFICANT CHANGE UP (ref 0.2–1.2)
BILIRUB UR-MCNC: NEGATIVE — SIGNIFICANT CHANGE UP
BILIRUB UR-MCNC: NEGATIVE — SIGNIFICANT CHANGE UP
BLD GP AB SCN SERPL QL: NEGATIVE — SIGNIFICANT CHANGE UP
BLD GP AB SCN SERPL QL: NEGATIVE — SIGNIFICANT CHANGE UP
BLOOD GAS ARTERIAL - LYTES,HGB,ICA,LACT RESULT: SIGNIFICANT CHANGE UP
BLOOD GAS ARTERIAL - LYTES,HGB,ICA,LACT RESULT: SIGNIFICANT CHANGE UP
BUN SERPL-MCNC: 34 MG/DL — HIGH (ref 7–23)
BUN SERPL-MCNC: 34 MG/DL — HIGH (ref 7–23)
BUN SERPL-MCNC: 36 MG/DL — HIGH (ref 7–23)
BUN SERPL-MCNC: 36 MG/DL — HIGH (ref 7–23)
BUN SERPL-MCNC: 39 MG/DL — HIGH (ref 7–23)
BUN SERPL-MCNC: 39 MG/DL — HIGH (ref 7–23)
BUN SERPL-MCNC: 40 MG/DL — HIGH (ref 7–23)
BUN SERPL-MCNC: 40 MG/DL — HIGH (ref 7–23)
CA-I SERPL-SCNC: 1.13 MMOL/L — LOW (ref 1.15–1.33)
CA-I SERPL-SCNC: 1.13 MMOL/L — LOW (ref 1.15–1.33)
CALCIUM SERPL-MCNC: 8.1 MG/DL — LOW (ref 8.4–10.5)
CALCIUM SERPL-MCNC: 8.1 MG/DL — LOW (ref 8.4–10.5)
CALCIUM SERPL-MCNC: 8.4 MG/DL — SIGNIFICANT CHANGE UP (ref 8.4–10.5)
CALCIUM SERPL-MCNC: 8.4 MG/DL — SIGNIFICANT CHANGE UP (ref 8.4–10.5)
CALCIUM SERPL-MCNC: 8.5 MG/DL — SIGNIFICANT CHANGE UP (ref 8.4–10.5)
CHLORIDE BLDV-SCNC: 101 MMOL/L — SIGNIFICANT CHANGE UP (ref 96–108)
CHLORIDE BLDV-SCNC: 101 MMOL/L — SIGNIFICANT CHANGE UP (ref 96–108)
CHLORIDE SERPL-SCNC: 100 MMOL/L — SIGNIFICANT CHANGE UP (ref 98–107)
CHLORIDE SERPL-SCNC: 102 MMOL/L — SIGNIFICANT CHANGE UP (ref 98–107)
CHLORIDE SERPL-SCNC: 102 MMOL/L — SIGNIFICANT CHANGE UP (ref 98–107)
CHOLEST SERPL-MCNC: 66 MG/DL — SIGNIFICANT CHANGE UP
CHOLEST SERPL-MCNC: 66 MG/DL — SIGNIFICANT CHANGE UP
CK MB BLD-MCNC: 1 % — SIGNIFICANT CHANGE UP (ref 0–2.5)
CK MB BLD-MCNC: 1 % — SIGNIFICANT CHANGE UP (ref 0–2.5)
CK MB CFR SERPL CALC: 3.6 NG/ML — SIGNIFICANT CHANGE UP
CK MB CFR SERPL CALC: 3.6 NG/ML — SIGNIFICANT CHANGE UP
CK SERPL-CCNC: 348 U/L — HIGH (ref 30–200)
CK SERPL-CCNC: 348 U/L — HIGH (ref 30–200)
CO2 BLDV-SCNC: 23.2 MMOL/L — SIGNIFICANT CHANGE UP (ref 22–26)
CO2 BLDV-SCNC: 23.2 MMOL/L — SIGNIFICANT CHANGE UP (ref 22–26)
CO2 SERPL-SCNC: 20 MMOL/L — LOW (ref 22–31)
CO2 SERPL-SCNC: 21 MMOL/L — LOW (ref 22–31)
COLOR SPEC: SIGNIFICANT CHANGE UP
COLOR SPEC: SIGNIFICANT CHANGE UP
CREAT ?TM UR-MCNC: 145 MG/DL — SIGNIFICANT CHANGE UP
CREAT ?TM UR-MCNC: 145 MG/DL — SIGNIFICANT CHANGE UP
CREAT SERPL-MCNC: 1.72 MG/DL — HIGH (ref 0.5–1.3)
CREAT SERPL-MCNC: 1.72 MG/DL — HIGH (ref 0.5–1.3)
CREAT SERPL-MCNC: 1.77 MG/DL — HIGH (ref 0.5–1.3)
CREAT SERPL-MCNC: 1.77 MG/DL — HIGH (ref 0.5–1.3)
CREAT SERPL-MCNC: 1.83 MG/DL — HIGH (ref 0.5–1.3)
CREAT SERPL-MCNC: 1.83 MG/DL — HIGH (ref 0.5–1.3)
CREAT SERPL-MCNC: 1.89 MG/DL — HIGH (ref 0.5–1.3)
CREAT SERPL-MCNC: 1.89 MG/DL — HIGH (ref 0.5–1.3)
D DIMER BLD IA.RAPID-MCNC: 1976 NG/ML DDU — HIGH
D DIMER BLD IA.RAPID-MCNC: 1976 NG/ML DDU — HIGH
DIFF PNL FLD: ABNORMAL
DIFF PNL FLD: ABNORMAL
EGFR: 33 ML/MIN/1.73M2 — LOW
EGFR: 33 ML/MIN/1.73M2 — LOW
EGFR: 35 ML/MIN/1.73M2 — LOW
EGFR: 35 ML/MIN/1.73M2 — LOW
EGFR: 36 ML/MIN/1.73M2 — LOW
EGFR: 36 ML/MIN/1.73M2 — LOW
EGFR: 37 ML/MIN/1.73M2 — LOW
EGFR: 37 ML/MIN/1.73M2 — LOW
EOSINOPHIL # BLD AUTO: 0 K/UL — SIGNIFICANT CHANGE UP (ref 0–0.5)
EOSINOPHIL # BLD AUTO: 0 K/UL — SIGNIFICANT CHANGE UP (ref 0–0.5)
EOSINOPHIL NFR BLD AUTO: 0 % — SIGNIFICANT CHANGE UP (ref 0–6)
EOSINOPHIL NFR BLD AUTO: 0 % — SIGNIFICANT CHANGE UP (ref 0–6)
ESTIMATED AVERAGE GLUCOSE: 220 — SIGNIFICANT CHANGE UP
ESTIMATED AVERAGE GLUCOSE: 220 — SIGNIFICANT CHANGE UP
GAS PNL BLDV: 131 MMOL/L — LOW (ref 136–145)
GAS PNL BLDV: 131 MMOL/L — LOW (ref 136–145)
GAS PNL BLDV: SIGNIFICANT CHANGE UP
GLUCOSE BLDC GLUCOMTR-MCNC: 163 MG/DL — HIGH (ref 70–99)
GLUCOSE BLDC GLUCOMTR-MCNC: 163 MG/DL — HIGH (ref 70–99)
GLUCOSE BLDC GLUCOMTR-MCNC: 190 MG/DL — HIGH (ref 70–99)
GLUCOSE BLDC GLUCOMTR-MCNC: 190 MG/DL — HIGH (ref 70–99)
GLUCOSE BLDV-MCNC: 270 MG/DL — HIGH (ref 70–99)
GLUCOSE BLDV-MCNC: 270 MG/DL — HIGH (ref 70–99)
GLUCOSE SERPL-MCNC: 188 MG/DL — HIGH (ref 70–99)
GLUCOSE SERPL-MCNC: 188 MG/DL — HIGH (ref 70–99)
GLUCOSE SERPL-MCNC: 216 MG/DL — HIGH (ref 70–99)
GLUCOSE SERPL-MCNC: 216 MG/DL — HIGH (ref 70–99)
GLUCOSE SERPL-MCNC: 222 MG/DL — HIGH (ref 70–99)
GLUCOSE SERPL-MCNC: 222 MG/DL — HIGH (ref 70–99)
GLUCOSE SERPL-MCNC: 248 MG/DL — HIGH (ref 70–99)
GLUCOSE SERPL-MCNC: 248 MG/DL — HIGH (ref 70–99)
GLUCOSE UR QL: 500 MG/DL
GLUCOSE UR QL: 500 MG/DL
HCO3 BLDV-SCNC: 22 MMOL/L — SIGNIFICANT CHANGE UP (ref 22–29)
HCO3 BLDV-SCNC: 22 MMOL/L — SIGNIFICANT CHANGE UP (ref 22–29)
HCT VFR BLD CALC: 32.1 % — LOW (ref 39–50)
HCT VFR BLD CALC: 32.1 % — LOW (ref 39–50)
HCT VFR BLD CALC: 32.9 % — LOW (ref 39–50)
HCT VFR BLD CALC: 32.9 % — LOW (ref 39–50)
HCT VFR BLD CALC: 33.9 % — LOW (ref 39–50)
HCT VFR BLD CALC: 33.9 % — LOW (ref 39–50)
HCT VFR BLDA CALC: 38 % — LOW (ref 39–51)
HCT VFR BLDA CALC: 38 % — LOW (ref 39–51)
HDLC SERPL-MCNC: 26 MG/DL — LOW
HDLC SERPL-MCNC: 26 MG/DL — LOW
HGB BLD CALC-MCNC: 12.7 G/DL — SIGNIFICANT CHANGE UP (ref 12.6–17.4)
HGB BLD CALC-MCNC: 12.7 G/DL — SIGNIFICANT CHANGE UP (ref 12.6–17.4)
HGB BLD-MCNC: 11.1 G/DL — LOW (ref 13–17)
HGB BLD-MCNC: 11.1 G/DL — LOW (ref 13–17)
HGB BLD-MCNC: 11.4 G/DL — LOW (ref 13–17)
HGB BLD-MCNC: 11.4 G/DL — LOW (ref 13–17)
HGB BLD-MCNC: 11.8 G/DL — LOW (ref 13–17)
HGB BLD-MCNC: 11.8 G/DL — LOW (ref 13–17)
IANC: 13.87 K/UL — HIGH (ref 1.8–7.4)
IANC: 13.87 K/UL — HIGH (ref 1.8–7.4)
IMM GRANULOCYTES NFR BLD AUTO: 0.8 % — SIGNIFICANT CHANGE UP (ref 0–0.9)
IMM GRANULOCYTES NFR BLD AUTO: 0.8 % — SIGNIFICANT CHANGE UP (ref 0–0.9)
KETONES UR-MCNC: ABNORMAL MG/DL
KETONES UR-MCNC: ABNORMAL MG/DL
LACTATE BLDV-MCNC: 1.7 MMOL/L — SIGNIFICANT CHANGE UP (ref 0.5–2)
LACTATE BLDV-MCNC: 1.7 MMOL/L — SIGNIFICANT CHANGE UP (ref 0.5–2)
LACTATE BLDV-MCNC: 1.8 MMOL/L — SIGNIFICANT CHANGE UP (ref 0.5–2)
LACTATE BLDV-MCNC: 1.8 MMOL/L — SIGNIFICANT CHANGE UP (ref 0.5–2)
LACTATE SERPL-SCNC: 1.6 MMOL/L — SIGNIFICANT CHANGE UP (ref 0.5–2)
LACTATE SERPL-SCNC: 1.6 MMOL/L — SIGNIFICANT CHANGE UP (ref 0.5–2)
LEUKOCYTE ESTERASE UR-ACNC: ABNORMAL
LEUKOCYTE ESTERASE UR-ACNC: ABNORMAL
LIPID PNL WITH DIRECT LDL SERPL: 20 MG/DL — SIGNIFICANT CHANGE UP
LIPID PNL WITH DIRECT LDL SERPL: 20 MG/DL — SIGNIFICANT CHANGE UP
LYMPHOCYTES # BLD AUTO: 1.32 K/UL — SIGNIFICANT CHANGE UP (ref 1–3.3)
LYMPHOCYTES # BLD AUTO: 1.32 K/UL — SIGNIFICANT CHANGE UP (ref 1–3.3)
LYMPHOCYTES # BLD AUTO: 7.9 % — LOW (ref 13–44)
LYMPHOCYTES # BLD AUTO: 7.9 % — LOW (ref 13–44)
MAGNESIUM SERPL-MCNC: 1.9 MG/DL — SIGNIFICANT CHANGE UP (ref 1.6–2.6)
MAGNESIUM SERPL-MCNC: 1.9 MG/DL — SIGNIFICANT CHANGE UP (ref 1.6–2.6)
MAGNESIUM SERPL-MCNC: 2 MG/DL — SIGNIFICANT CHANGE UP (ref 1.6–2.6)
MAGNESIUM SERPL-MCNC: 2.1 MG/DL — SIGNIFICANT CHANGE UP (ref 1.6–2.6)
MAGNESIUM SERPL-MCNC: 2.1 MG/DL — SIGNIFICANT CHANGE UP (ref 1.6–2.6)
MCHC RBC-ENTMCNC: 29.9 PG — SIGNIFICANT CHANGE UP (ref 27–34)
MCHC RBC-ENTMCNC: 29.9 PG — SIGNIFICANT CHANGE UP (ref 27–34)
MCHC RBC-ENTMCNC: 30.2 PG — SIGNIFICANT CHANGE UP (ref 27–34)
MCHC RBC-ENTMCNC: 30.2 PG — SIGNIFICANT CHANGE UP (ref 27–34)
MCHC RBC-ENTMCNC: 31.1 PG — SIGNIFICANT CHANGE UP (ref 27–34)
MCHC RBC-ENTMCNC: 31.1 PG — SIGNIFICANT CHANGE UP (ref 27–34)
MCHC RBC-ENTMCNC: 34.6 GM/DL — SIGNIFICANT CHANGE UP (ref 32–36)
MCHC RBC-ENTMCNC: 34.6 GM/DL — SIGNIFICANT CHANGE UP (ref 32–36)
MCHC RBC-ENTMCNC: 34.7 GM/DL — SIGNIFICANT CHANGE UP (ref 32–36)
MCHC RBC-ENTMCNC: 34.7 GM/DL — SIGNIFICANT CHANGE UP (ref 32–36)
MCHC RBC-ENTMCNC: 34.8 GM/DL — SIGNIFICANT CHANGE UP (ref 32–36)
MCHC RBC-ENTMCNC: 34.8 GM/DL — SIGNIFICANT CHANGE UP (ref 32–36)
MCV RBC AUTO: 86.5 FL — SIGNIFICANT CHANGE UP (ref 80–100)
MCV RBC AUTO: 86.5 FL — SIGNIFICANT CHANGE UP (ref 80–100)
MCV RBC AUTO: 87.3 FL — SIGNIFICANT CHANGE UP (ref 80–100)
MCV RBC AUTO: 87.3 FL — SIGNIFICANT CHANGE UP (ref 80–100)
MCV RBC AUTO: 89.4 FL — SIGNIFICANT CHANGE UP (ref 80–100)
MCV RBC AUTO: 89.4 FL — SIGNIFICANT CHANGE UP (ref 80–100)
MONOCYTES # BLD AUTO: 1.26 K/UL — HIGH (ref 0–0.9)
MONOCYTES # BLD AUTO: 1.26 K/UL — HIGH (ref 0–0.9)
MONOCYTES NFR BLD AUTO: 7.6 % — SIGNIFICANT CHANGE UP (ref 2–14)
MONOCYTES NFR BLD AUTO: 7.6 % — SIGNIFICANT CHANGE UP (ref 2–14)
NEUTROPHILS # BLD AUTO: 13.87 K/UL — HIGH (ref 1.8–7.4)
NEUTROPHILS # BLD AUTO: 13.87 K/UL — HIGH (ref 1.8–7.4)
NEUTROPHILS NFR BLD AUTO: 83.5 % — HIGH (ref 43–77)
NEUTROPHILS NFR BLD AUTO: 83.5 % — HIGH (ref 43–77)
NITRITE UR-MCNC: NEGATIVE — SIGNIFICANT CHANGE UP
NITRITE UR-MCNC: NEGATIVE — SIGNIFICANT CHANGE UP
NON HDL CHOLESTEROL: 40 MG/DL — SIGNIFICANT CHANGE UP
NON HDL CHOLESTEROL: 40 MG/DL — SIGNIFICANT CHANGE UP
NRBC # BLD: 0 /100 WBCS — SIGNIFICANT CHANGE UP (ref 0–0)
NRBC # FLD: 0 K/UL — SIGNIFICANT CHANGE UP (ref 0–0)
PCO2 BLDV: 38 MMHG — LOW (ref 42–55)
PCO2 BLDV: 38 MMHG — LOW (ref 42–55)
PH BLDV: 7.37 — SIGNIFICANT CHANGE UP (ref 7.32–7.43)
PH BLDV: 7.37 — SIGNIFICANT CHANGE UP (ref 7.32–7.43)
PH UR: 5.5 — SIGNIFICANT CHANGE UP (ref 5–8)
PH UR: 5.5 — SIGNIFICANT CHANGE UP (ref 5–8)
PHOSPHATE SERPL-MCNC: 2.5 MG/DL — SIGNIFICANT CHANGE UP (ref 2.5–4.5)
PHOSPHATE SERPL-MCNC: 2.6 MG/DL — SIGNIFICANT CHANGE UP (ref 2.5–4.5)
PHOSPHATE SERPL-MCNC: 2.6 MG/DL — SIGNIFICANT CHANGE UP (ref 2.5–4.5)
PHOSPHATE SERPL-MCNC: 2.7 MG/DL — SIGNIFICANT CHANGE UP (ref 2.5–4.5)
PHOSPHATE SERPL-MCNC: 2.7 MG/DL — SIGNIFICANT CHANGE UP (ref 2.5–4.5)
PLATELET # BLD AUTO: 174 K/UL — SIGNIFICANT CHANGE UP (ref 150–400)
PLATELET # BLD AUTO: 174 K/UL — SIGNIFICANT CHANGE UP (ref 150–400)
PLATELET # BLD AUTO: 189 K/UL — SIGNIFICANT CHANGE UP (ref 150–400)
PLATELET # BLD AUTO: 189 K/UL — SIGNIFICANT CHANGE UP (ref 150–400)
PLATELET # BLD AUTO: 198 K/UL — SIGNIFICANT CHANGE UP (ref 150–400)
PLATELET # BLD AUTO: 198 K/UL — SIGNIFICANT CHANGE UP (ref 150–400)
PO2 BLDV: 45 MMHG — SIGNIFICANT CHANGE UP (ref 25–45)
PO2 BLDV: 45 MMHG — SIGNIFICANT CHANGE UP (ref 25–45)
POTASSIUM BLDV-SCNC: 3.8 MMOL/L — SIGNIFICANT CHANGE UP (ref 3.5–5.1)
POTASSIUM BLDV-SCNC: 3.8 MMOL/L — SIGNIFICANT CHANGE UP (ref 3.5–5.1)
POTASSIUM SERPL-MCNC: 3.8 MMOL/L — SIGNIFICANT CHANGE UP (ref 3.5–5.3)
POTASSIUM SERPL-MCNC: 3.8 MMOL/L — SIGNIFICANT CHANGE UP (ref 3.5–5.3)
POTASSIUM SERPL-MCNC: 3.9 MMOL/L — SIGNIFICANT CHANGE UP (ref 3.5–5.3)
POTASSIUM SERPL-MCNC: 3.9 MMOL/L — SIGNIFICANT CHANGE UP (ref 3.5–5.3)
POTASSIUM SERPL-MCNC: 4 MMOL/L — SIGNIFICANT CHANGE UP (ref 3.5–5.3)
POTASSIUM SERPL-MCNC: 4 MMOL/L — SIGNIFICANT CHANGE UP (ref 3.5–5.3)
POTASSIUM SERPL-MCNC: 4.2 MMOL/L — SIGNIFICANT CHANGE UP (ref 3.5–5.3)
POTASSIUM SERPL-MCNC: 4.2 MMOL/L — SIGNIFICANT CHANGE UP (ref 3.5–5.3)
POTASSIUM SERPL-SCNC: 3.8 MMOL/L — SIGNIFICANT CHANGE UP (ref 3.5–5.3)
POTASSIUM SERPL-SCNC: 3.8 MMOL/L — SIGNIFICANT CHANGE UP (ref 3.5–5.3)
POTASSIUM SERPL-SCNC: 3.9 MMOL/L — SIGNIFICANT CHANGE UP (ref 3.5–5.3)
POTASSIUM SERPL-SCNC: 3.9 MMOL/L — SIGNIFICANT CHANGE UP (ref 3.5–5.3)
POTASSIUM SERPL-SCNC: 4 MMOL/L — SIGNIFICANT CHANGE UP (ref 3.5–5.3)
POTASSIUM SERPL-SCNC: 4 MMOL/L — SIGNIFICANT CHANGE UP (ref 3.5–5.3)
POTASSIUM SERPL-SCNC: 4.2 MMOL/L — SIGNIFICANT CHANGE UP (ref 3.5–5.3)
POTASSIUM SERPL-SCNC: 4.2 MMOL/L — SIGNIFICANT CHANGE UP (ref 3.5–5.3)
PROCALCITONIN SERPL-MCNC: 2.7 NG/ML — HIGH (ref 0.02–0.1)
PROCALCITONIN SERPL-MCNC: 2.7 NG/ML — HIGH (ref 0.02–0.1)
PROT SERPL-MCNC: 6.6 G/DL — SIGNIFICANT CHANGE UP (ref 6–8.3)
PROT SERPL-MCNC: 6.6 G/DL — SIGNIFICANT CHANGE UP (ref 6–8.3)
PROT SERPL-MCNC: 7.1 G/DL — SIGNIFICANT CHANGE UP (ref 6–8.3)
PROT UR-MCNC: 300 MG/DL
PROT UR-MCNC: 300 MG/DL
RBC # BLD: 3.71 M/UL — LOW (ref 4.2–5.8)
RBC # BLD: 3.71 M/UL — LOW (ref 4.2–5.8)
RBC # BLD: 3.77 M/UL — LOW (ref 4.2–5.8)
RBC # BLD: 3.77 M/UL — LOW (ref 4.2–5.8)
RBC # BLD: 3.79 M/UL — LOW (ref 4.2–5.8)
RBC # BLD: 3.79 M/UL — LOW (ref 4.2–5.8)
RBC # FLD: 14 % — SIGNIFICANT CHANGE UP (ref 10.3–14.5)
RBC # FLD: 14 % — SIGNIFICANT CHANGE UP (ref 10.3–14.5)
RBC # FLD: 14.2 % — SIGNIFICANT CHANGE UP (ref 10.3–14.5)
RBC # FLD: 14.2 % — SIGNIFICANT CHANGE UP (ref 10.3–14.5)
RBC # FLD: 14.3 % — SIGNIFICANT CHANGE UP (ref 10.3–14.5)
RBC # FLD: 14.3 % — SIGNIFICANT CHANGE UP (ref 10.3–14.5)
RH IG SCN BLD-IMP: POSITIVE — SIGNIFICANT CHANGE UP
RH IG SCN BLD-IMP: POSITIVE — SIGNIFICANT CHANGE UP
SAO2 % BLDV: 67.7 % — SIGNIFICANT CHANGE UP (ref 67–88)
SAO2 % BLDV: 67.7 % — SIGNIFICANT CHANGE UP (ref 67–88)
SODIUM SERPL-SCNC: 134 MMOL/L — LOW (ref 135–145)
SODIUM SERPL-SCNC: 135 MMOL/L — SIGNIFICANT CHANGE UP (ref 135–145)
SODIUM UR-SCNC: 20 MMOL/L — SIGNIFICANT CHANGE UP
SODIUM UR-SCNC: 20 MMOL/L — SIGNIFICANT CHANGE UP
SP GR SPEC: 1.03 — SIGNIFICANT CHANGE UP (ref 1–1.03)
SP GR SPEC: 1.03 — SIGNIFICANT CHANGE UP (ref 1–1.03)
TRIGL SERPL-MCNC: 102 MG/DL — SIGNIFICANT CHANGE UP
TRIGL SERPL-MCNC: 102 MG/DL — SIGNIFICANT CHANGE UP
TROPONIN T, HIGH SENSITIVITY RESULT: 45 NG/L — SIGNIFICANT CHANGE UP
TROPONIN T, HIGH SENSITIVITY RESULT: 45 NG/L — SIGNIFICANT CHANGE UP
TROPONIN T, HIGH SENSITIVITY RESULT: 49 NG/L — SIGNIFICANT CHANGE UP
TROPONIN T, HIGH SENSITIVITY RESULT: 49 NG/L — SIGNIFICANT CHANGE UP
TSH SERPL-MCNC: 1.27 UIU/ML — SIGNIFICANT CHANGE UP (ref 0.27–4.2)
TSH SERPL-MCNC: 1.27 UIU/ML — SIGNIFICANT CHANGE UP (ref 0.27–4.2)
UROBILINOGEN FLD QL: 1 MG/DL — SIGNIFICANT CHANGE UP (ref 0.2–1)
UROBILINOGEN FLD QL: 1 MG/DL — SIGNIFICANT CHANGE UP (ref 0.2–1)
WBC # BLD: 15.35 K/UL — HIGH (ref 3.8–10.5)
WBC # BLD: 15.35 K/UL — HIGH (ref 3.8–10.5)
WBC # BLD: 16.61 K/UL — HIGH (ref 3.8–10.5)
WBC # BLD: 16.61 K/UL — HIGH (ref 3.8–10.5)
WBC # BLD: 17.36 K/UL — HIGH (ref 3.8–10.5)
WBC # BLD: 17.36 K/UL — HIGH (ref 3.8–10.5)
WBC # FLD AUTO: 15.35 K/UL — HIGH (ref 3.8–10.5)
WBC # FLD AUTO: 15.35 K/UL — HIGH (ref 3.8–10.5)
WBC # FLD AUTO: 16.61 K/UL — HIGH (ref 3.8–10.5)
WBC # FLD AUTO: 16.61 K/UL — HIGH (ref 3.8–10.5)
WBC # FLD AUTO: 17.36 K/UL — HIGH (ref 3.8–10.5)
WBC # FLD AUTO: 17.36 K/UL — HIGH (ref 3.8–10.5)

## 2023-12-24 PROCEDURE — 99222 1ST HOSP IP/OBS MODERATE 55: CPT | Mod: 25,GC

## 2023-12-24 PROCEDURE — 99223 1ST HOSP IP/OBS HIGH 75: CPT | Mod: 57

## 2023-12-24 PROCEDURE — 49320 DIAG LAPARO SEPARATE PROC: CPT | Mod: GC

## 2023-12-24 PROCEDURE — 71260 CT THORAX DX C+: CPT | Mod: 26

## 2023-12-24 PROCEDURE — 93010 ELECTROCARDIOGRAM REPORT: CPT

## 2023-12-24 PROCEDURE — 74018 RADEX ABDOMEN 1 VIEW: CPT | Mod: 26

## 2023-12-24 PROCEDURE — 74174 CTA ABD&PLVS W/CONTRAST: CPT | Mod: 26

## 2023-12-24 DEVICE — CATH ANGIO GLIDECATH ANGLE 5FR X 100CM: Type: IMPLANTABLE DEVICE | Status: FUNCTIONAL

## 2023-12-24 DEVICE — GUIDEWIRE AMPLATZ SUPER-STIFF SHORT TAPER .035" X 260CM: Type: IMPLANTABLE DEVICE | Status: FUNCTIONAL

## 2023-12-24 DEVICE — SHEATH INTRODUCER TERUMO PINNACLE PERIPHERAL 5FR X 10CM X 0.035" MINI WIRE: Type: IMPLANTABLE DEVICE | Status: FUNCTIONAL

## 2023-12-24 DEVICE — MICRO-INTRO 4F 0.018X40CM NITINOL TUNG TIP 7CM ECHOGENIC: Type: IMPLANTABLE DEVICE | Status: FUNCTIONAL

## 2023-12-24 DEVICE — GUIDEWIRE RADIFOCUS GLIDEWIRE STANDARD ANGLED TIP 0.035" X 260CM: Type: IMPLANTABLE DEVICE | Status: FUNCTIONAL

## 2023-12-24 DEVICE — SURGIFLO HEMOSTATIC MATRIX KIT: Type: IMPLANTABLE DEVICE | Status: FUNCTIONAL

## 2023-12-24 DEVICE — SURGIFOAM PAD 8CM X 12.5CM X 2MM (100C): Type: IMPLANTABLE DEVICE | Status: FUNCTIONAL

## 2023-12-24 DEVICE — CATH ANGIO GLIDECATH MP 5FR X 100CM: Type: IMPLANTABLE DEVICE | Status: FUNCTIONAL

## 2023-12-24 DEVICE — CATH ANGIO GLIDECATH COBRA2 C2 5FR X 65CM: Type: IMPLANTABLE DEVICE | Status: FUNCTIONAL

## 2023-12-24 DEVICE — CATH SIZING AUROUS PIGTAIL 5FR X 0.035": Type: IMPLANTABLE DEVICE | Status: FUNCTIONAL

## 2023-12-24 DEVICE — IMPLANTABLE DEVICE: Type: IMPLANTABLE DEVICE | Status: FUNCTIONAL

## 2023-12-24 DEVICE — SHEATH GUIDING CRV 7FRX55CM: Type: IMPLANTABLE DEVICE | Status: FUNCTIONAL

## 2023-12-24 DEVICE — CATH QUICK CROSS .035X135CM: Type: IMPLANTABLE DEVICE | Status: FUNCTIONAL

## 2023-12-24 DEVICE — GWIRE ROSEN 0.035INX260CM: Type: IMPLANTABLE DEVICE | Status: FUNCTIONAL

## 2023-12-24 DEVICE — INTRO SHEATH ANSEL 7F .035X55CM: Type: IMPLANTABLE DEVICE | Status: FUNCTIONAL

## 2023-12-24 RX ORDER — METOPROLOL TARTRATE 50 MG
25 TABLET ORAL EVERY 12 HOURS
Refills: 0 | Status: DISCONTINUED | OUTPATIENT
Start: 2023-12-24 | End: 2023-12-25

## 2023-12-24 RX ORDER — SODIUM CHLORIDE 9 MG/ML
1000 INJECTION, SOLUTION INTRAVENOUS
Refills: 0 | Status: DISCONTINUED | OUTPATIENT
Start: 2023-12-24 | End: 2023-12-25

## 2023-12-24 RX ORDER — DEXTROSE 50 % IN WATER 50 %
15 SYRINGE (ML) INTRAVENOUS ONCE
Refills: 0 | Status: DISCONTINUED | OUTPATIENT
Start: 2023-12-24 | End: 2023-12-25

## 2023-12-24 RX ORDER — ESCITALOPRAM OXALATE 10 MG/1
10 TABLET, FILM COATED ORAL DAILY
Refills: 0 | Status: DISCONTINUED | OUTPATIENT
Start: 2023-12-24 | End: 2023-12-25

## 2023-12-24 RX ORDER — HEPARIN SODIUM 5000 [USP'U]/ML
6500 INJECTION INTRAVENOUS; SUBCUTANEOUS EVERY 6 HOURS
Refills: 0 | Status: DISCONTINUED | OUTPATIENT
Start: 2023-12-24 | End: 2023-12-25

## 2023-12-24 RX ORDER — ACETAMINOPHEN 500 MG
650 TABLET ORAL EVERY 6 HOURS
Refills: 0 | Status: DISCONTINUED | OUTPATIENT
Start: 2023-12-24 | End: 2023-12-25

## 2023-12-24 RX ORDER — DEXTROSE 50 % IN WATER 50 %
12.5 SYRINGE (ML) INTRAVENOUS ONCE
Refills: 0 | Status: DISCONTINUED | OUTPATIENT
Start: 2023-12-24 | End: 2023-12-25

## 2023-12-24 RX ORDER — TICAGRELOR 90 MG/1
60 TABLET ORAL EVERY 12 HOURS
Refills: 0 | Status: DISCONTINUED | OUTPATIENT
Start: 2023-12-24 | End: 2023-12-25

## 2023-12-24 RX ORDER — HEPARIN SODIUM 5000 [USP'U]/ML
6500 INJECTION INTRAVENOUS; SUBCUTANEOUS ONCE
Refills: 0 | Status: DISCONTINUED | OUTPATIENT
Start: 2023-12-24 | End: 2023-12-25

## 2023-12-24 RX ORDER — ENOXAPARIN SODIUM 100 MG/ML
30 INJECTION SUBCUTANEOUS EVERY 24 HOURS
Refills: 0 | Status: DISCONTINUED | OUTPATIENT
Start: 2023-12-24 | End: 2023-12-24

## 2023-12-24 RX ORDER — INSULIN LISPRO 100/ML
2 VIAL (ML) SUBCUTANEOUS
Refills: 0 | Status: DISCONTINUED | OUTPATIENT
Start: 2023-12-24 | End: 2023-12-25

## 2023-12-24 RX ORDER — PIPERACILLIN AND TAZOBACTAM 4; .5 G/20ML; G/20ML
3.38 INJECTION, POWDER, LYOPHILIZED, FOR SOLUTION INTRAVENOUS ONCE
Refills: 0 | Status: COMPLETED | OUTPATIENT
Start: 2023-12-24 | End: 2023-12-24

## 2023-12-24 RX ORDER — HEPARIN SODIUM 5000 [USP'U]/ML
3000 INJECTION INTRAVENOUS; SUBCUTANEOUS EVERY 6 HOURS
Refills: 0 | Status: DISCONTINUED | OUTPATIENT
Start: 2023-12-24 | End: 2023-12-25

## 2023-12-24 RX ORDER — ATORVASTATIN CALCIUM 80 MG/1
80 TABLET, FILM COATED ORAL AT BEDTIME
Refills: 0 | Status: DISCONTINUED | OUTPATIENT
Start: 2023-12-24 | End: 2023-12-25

## 2023-12-24 RX ORDER — PIPERACILLIN AND TAZOBACTAM 4; .5 G/20ML; G/20ML
3.38 INJECTION, POWDER, LYOPHILIZED, FOR SOLUTION INTRAVENOUS EVERY 12 HOURS
Refills: 0 | Status: COMPLETED | OUTPATIENT
Start: 2023-12-25 | End: 2023-12-31

## 2023-12-24 RX ORDER — DEXTROSE 50 % IN WATER 50 %
25 SYRINGE (ML) INTRAVENOUS ONCE
Refills: 0 | Status: DISCONTINUED | OUTPATIENT
Start: 2023-12-24 | End: 2023-12-25

## 2023-12-24 RX ORDER — MEMANTINE HYDROCHLORIDE 10 MG/1
5 TABLET ORAL
Refills: 0 | Status: DISCONTINUED | OUTPATIENT
Start: 2023-12-24 | End: 2023-12-25

## 2023-12-24 RX ORDER — GLUCAGON INJECTION, SOLUTION 0.5 MG/.1ML
1 INJECTION, SOLUTION SUBCUTANEOUS ONCE
Refills: 0 | Status: DISCONTINUED | OUTPATIENT
Start: 2023-12-24 | End: 2023-12-25

## 2023-12-24 RX ORDER — REMDESIVIR 5 MG/ML
INJECTION INTRAVENOUS
Refills: 0 | Status: DISCONTINUED | OUTPATIENT
Start: 2023-12-24 | End: 2023-12-26

## 2023-12-24 RX ORDER — ONDANSETRON 8 MG/1
4 TABLET, FILM COATED ORAL EVERY 8 HOURS
Refills: 0 | Status: DISCONTINUED | OUTPATIENT
Start: 2023-12-24 | End: 2023-12-25

## 2023-12-24 RX ORDER — REMDESIVIR 5 MG/ML
200 INJECTION INTRAVENOUS EVERY 24 HOURS
Refills: 0 | Status: COMPLETED | OUTPATIENT
Start: 2023-12-24 | End: 2023-12-25

## 2023-12-24 RX ORDER — ASPIRIN/CALCIUM CARB/MAGNESIUM 324 MG
81 TABLET ORAL DAILY
Refills: 0 | Status: DISCONTINUED | OUTPATIENT
Start: 2023-12-24 | End: 2023-12-25

## 2023-12-24 RX ORDER — CHOLECALCIFEROL (VITAMIN D3) 125 MCG
1 CAPSULE ORAL
Qty: 0 | Refills: 0 | DISCHARGE

## 2023-12-24 RX ORDER — LINACLOTIDE 145 UG/1
1 CAPSULE, GELATIN COATED ORAL
Qty: 0 | Refills: 0 | DISCHARGE

## 2023-12-24 RX ORDER — LEVETIRACETAM 250 MG/1
250 TABLET, FILM COATED ORAL
Refills: 0 | Status: DISCONTINUED | OUTPATIENT
Start: 2023-12-24 | End: 2023-12-25

## 2023-12-24 RX ORDER — GABAPENTIN 400 MG/1
100 CAPSULE ORAL
Refills: 0 | Status: DISCONTINUED | OUTPATIENT
Start: 2023-12-24 | End: 2023-12-25

## 2023-12-24 RX ORDER — HEPARIN SODIUM 5000 [USP'U]/ML
INJECTION INTRAVENOUS; SUBCUTANEOUS
Qty: 25000 | Refills: 0 | Status: DISCONTINUED | OUTPATIENT
Start: 2023-12-24 | End: 2023-12-25

## 2023-12-24 RX ORDER — INSULIN LISPRO 100/ML
VIAL (ML) SUBCUTANEOUS
Refills: 0 | Status: DISCONTINUED | OUTPATIENT
Start: 2023-12-24 | End: 2023-12-25

## 2023-12-24 RX ORDER — INSULIN LISPRO 100/ML
VIAL (ML) SUBCUTANEOUS AT BEDTIME
Refills: 0 | Status: DISCONTINUED | OUTPATIENT
Start: 2023-12-24 | End: 2023-12-25

## 2023-12-24 RX ORDER — INSULIN GLARGINE 100 [IU]/ML
18 INJECTION, SOLUTION SUBCUTANEOUS AT BEDTIME
Refills: 0 | Status: DISCONTINUED | OUTPATIENT
Start: 2023-12-24 | End: 2023-12-25

## 2023-12-24 RX ORDER — ENOXAPARIN SODIUM 100 MG/ML
40 INJECTION SUBCUTANEOUS EVERY 24 HOURS
Refills: 0 | Status: DISCONTINUED | OUTPATIENT
Start: 2023-12-24 | End: 2023-12-24

## 2023-12-24 RX ORDER — LANOLIN ALCOHOL/MO/W.PET/CERES
3 CREAM (GRAM) TOPICAL AT BEDTIME
Refills: 0 | Status: DISCONTINUED | OUTPATIENT
Start: 2023-12-24 | End: 2023-12-25

## 2023-12-24 RX ORDER — RANOLAZINE 500 MG/1
500 TABLET, FILM COATED, EXTENDED RELEASE ORAL
Refills: 0 | Status: DISCONTINUED | OUTPATIENT
Start: 2023-12-24 | End: 2023-12-25

## 2023-12-24 RX ADMIN — LEVETIRACETAM 250 MILLIGRAM(S): 250 TABLET, FILM COATED ORAL at 06:45

## 2023-12-24 RX ADMIN — Medication 25 MILLIGRAM(S): at 06:46

## 2023-12-24 RX ADMIN — MEMANTINE HYDROCHLORIDE 5 MILLIGRAM(S): 10 TABLET ORAL at 17:07

## 2023-12-24 RX ADMIN — SODIUM CHLORIDE 100 MILLILITER(S): 9 INJECTION, SOLUTION INTRAVENOUS at 02:02

## 2023-12-24 RX ADMIN — GABAPENTIN 100 MILLIGRAM(S): 400 CAPSULE ORAL at 06:45

## 2023-12-24 RX ADMIN — Medication 3 MILLIGRAM(S): at 02:07

## 2023-12-24 RX ADMIN — GABAPENTIN 100 MILLIGRAM(S): 400 CAPSULE ORAL at 17:06

## 2023-12-24 RX ADMIN — TICAGRELOR 60 MILLIGRAM(S): 90 TABLET ORAL at 17:07

## 2023-12-24 RX ADMIN — Medication 25 MILLIGRAM(S): at 17:08

## 2023-12-24 RX ADMIN — Medication 100 MILLIGRAM(S): at 08:40

## 2023-12-24 RX ADMIN — Medication 1: at 08:30

## 2023-12-24 RX ADMIN — PIPERACILLIN AND TAZOBACTAM 200 GRAM(S): 4; .5 INJECTION, POWDER, LYOPHILIZED, FOR SOLUTION INTRAVENOUS at 17:01

## 2023-12-24 RX ADMIN — MEMANTINE HYDROCHLORIDE 5 MILLIGRAM(S): 10 TABLET ORAL at 06:45

## 2023-12-24 RX ADMIN — ESCITALOPRAM OXALATE 10 MILLIGRAM(S): 10 TABLET, FILM COATED ORAL at 12:58

## 2023-12-24 RX ADMIN — Medication 650 MILLIGRAM(S): at 02:37

## 2023-12-24 RX ADMIN — RANOLAZINE 500 MILLIGRAM(S): 500 TABLET, FILM COATED, EXTENDED RELEASE ORAL at 17:07

## 2023-12-24 RX ADMIN — Medication 1: at 12:57

## 2023-12-24 RX ADMIN — Medication 81 MILLIGRAM(S): at 12:58

## 2023-12-24 RX ADMIN — ENOXAPARIN SODIUM 30 MILLIGRAM(S): 100 INJECTION SUBCUTANEOUS at 06:44

## 2023-12-24 RX ADMIN — TICAGRELOR 60 MILLIGRAM(S): 90 TABLET ORAL at 06:45

## 2023-12-24 RX ADMIN — RANOLAZINE 500 MILLIGRAM(S): 500 TABLET, FILM COATED, EXTENDED RELEASE ORAL at 06:45

## 2023-12-24 RX ADMIN — LEVETIRACETAM 250 MILLIGRAM(S): 250 TABLET, FILM COATED ORAL at 17:07

## 2023-12-24 RX ADMIN — Medication 650 MILLIGRAM(S): at 02:07

## 2023-12-24 NOTE — CONSULT NOTE ADULT - SUBJECTIVE AND OBJECTIVE BOX
VASCULAR SURGERY CONSULT NOTE  --------------------------------------------------------------------------------------------    Patient is a 89y old  Male who presents with a chief complaint of COVID19, Chest pain (24 Dec 2023 16:41)      HPI: HPI:    Mr. 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 Per medicine H&P "he has been having a dry cough for the past week, worsened over the past few days. Denies SOB with the cough, denies hemoptysis. Reports fevers at home to 101.5 with associated diaphoresis. Said that he has significant pinprick like b/l chest pain with his cough but denies any chest pain when not coughing or with ambulation. Also reports rhinorrhea and sore throat. Reports generalized weakness and poor appetite. States his daughter was visiting him over the week end last week and she was positive for COVID19. Patient tested positive for COVID19 2 days prior and was started on paxlovid as outpatient. Of note, family states that the patient has had worsening confusion at home over the past 6 months (becoming disoriented to time) but recently has been more confused, talking about seeing people that are not present. ".     Vascular surgery was consulted for mesenteric ischemia. Labs demonstrate no acute pathology besides known MABLE, and lactate was 1.6. CT scan pending.       ROS: 10-system review is otherwise negative except HPI above.      PAST MEDICAL & SURGICAL HISTORY:  Hyperlipemia      Hypertension      Coronary Artery Disease      Diabetes Mellitus Type II      Stented Coronary Artery  total 5 stents, last stent 5/2019      Neuropathy      Myocardial infarction      Stroke  mild left facial numbness   no other residuals verbalized      Myoclonic jerking      Stage 3 chronic kidney disease      History of Cataract Extraction      Hx of CABG      H/O coronary angiogram      S/P coronary artery stent placement  1/6/09      S/P placement of cardiac pacemaker        FAMILY HISTORY:  No pertinent family history in first degree relatives        SOCIAL HISTORY:      ALLERGIES: fluoroquinolone antibiotics (Other)  Cipro (Unknown)  Tegretol (Unknown)  carbamazepine (Other)      HOME MEDICATIONS:     CURRENT MEDICATIONS  MEDICATIONS (STANDING): aspirin enteric coated 81 milliGRAM(s) Oral daily  atorvastatin 80 milliGRAM(s) Oral at bedtime  dextrose 5%. 1000 milliLiter(s) IV Continuous <Continuous>  dextrose 5%. 1000 milliLiter(s) IV Continuous <Continuous>  dextrose 50% Injectable 25 Gram(s) IV Push once  dextrose 50% Injectable 25 Gram(s) IV Push once  dextrose 50% Injectable 12.5 Gram(s) IV Push once  escitalopram 10 milliGRAM(s) Oral daily  gabapentin 100 milliGRAM(s) Oral two times a day  glucagon  Injectable 1 milliGRAM(s) IntraMuscular once  heparin   Injectable 6500 Unit(s) IV Push once  heparin  Infusion.  Unit(s)/Hr IV Continuous <Continuous>  insulin glargine Injectable (LANTUS) 18 Unit(s) SubCutaneous at bedtime  insulin lispro (ADMELOG) corrective regimen sliding scale   SubCutaneous three times a day before meals  insulin lispro (ADMELOG) corrective regimen sliding scale   SubCutaneous at bedtime  insulin lispro Injectable (ADMELOG) 2 Unit(s) SubCutaneous before dinner  lactated ringers. 1000 milliLiter(s) IV Continuous <Continuous>  lactated ringers. 1000 milliLiter(s) IV Continuous <Continuous>  levETIRAcetam 250 milliGRAM(s) Oral two times a day  memantine 5 milliGRAM(s) Oral two times a day  metoprolol succinate ER 25 milliGRAM(s) Oral every 12 hours  piperacillin/tazobactam IVPB.- 3.375 Gram(s) IV Intermittent once  ranolazine 500 milliGRAM(s) Oral two times a day  remdesivir  IVPB   IV Intermittent   remdesivir  IVPB 200 milliGRAM(s) IV Intermittent every 24 hours  ticagrelor 60 milliGRAM(s) Oral every 12 hours    MEDICATIONS (PRN):acetaminophen     Tablet .. 650 milliGRAM(s) Oral every 6 hours PRN Temp greater or equal to 38C (100.4F), Mild Pain (1 - 3)  aluminum hydroxide/magnesium hydroxide/simethicone Suspension 30 milliLiter(s) Oral every 4 hours PRN Dyspepsia  dextrose Oral Gel 15 Gram(s) Oral once PRN Blood Glucose LESS THAN 70 milliGRAM(s)/deciliter  guaiFENesin Oral Liquid (Sugar-Free) 100 milliGRAM(s) Oral every 6 hours PRN Cough  heparin   Injectable 6500 Unit(s) IV Push every 6 hours PRN For aPTT less than 40  heparin   Injectable 3000 Unit(s) IV Push every 6 hours PRN For aPTT between 40 - 57  melatonin 3 milliGRAM(s) Oral at bedtime PRN Insomnia  ondansetron Injectable 4 milliGRAM(s) IV Push every 8 hours PRN Nausea and/or Vomiting    --------------------------------------------------------------------------------------------    Vitals:   T(C): 36.8 (12-24-23 @ 18:30), Max: 36.8 (12-24-23 @ 14:30)  HR: 86 (12-24-23 @ 18:30) (86 - 91)  BP: 128/67 (12-24-23 @ 18:30) (115/60 - 154/72)  RR: 16 (12-24-23 @ 18:30) (16 - 18)  SpO2: 98% (12-24-23 @ 18:30) (97% - 100%)  CAPILLARY BLOOD GLUCOSE      POCT Blood Glucose.: 163 mg/dL (24 Dec 2023 12:11)  POCT Blood Glucose.: 190 mg/dL (24 Dec 2023 09:08)    CAPILLARY BLOOD GLUCOSE      POCT Blood Glucose.: 163 mg/dL (24 Dec 2023 12:11)  POCT Blood Glucose.: 190 mg/dL (24 Dec 2023 09:08)      12-24 @ 07:01  -  12-24 @ 23:03  --------------------------------------------------------  IN:    Oral Fluid: 330 mL  Total IN: 330 mL    OUT:    Voided (mL): 350 mL  Total OUT: 350 mL    Total NET: -20 mL      Physical Examination:  GEN: uncomfortable, in some mild distress  NEURO: AAOx2, CN II-XII grossly intact, no focal deficits  PULM: symmetric chest rise bilaterally, no increased WOB  ABD: Voluntary gaurding, distended,  rebound tenderness in 4 quadrants-worse in RUQ  EXTR: no lower extremity edema, moving all extremities    Height (cm): 175.3 (12-24 @ 01:02)  Weight (kg): 79.379 (12-24 @ 01:02)  BMI (kg/m2): 25.8 (12-24 @ 01:02)  BSA (m2): 1.95 (12-24 @ 01:02)      --------------------------------------------------------------------------------------------    LABS  CBC (12-24 @ 21:38)                              11.1<L>                         15.35<H>  )----------------(  198        --    % Neutrophils, --    % Lymphocytes, ANC: --                                  32.1<L>  CBC (12-24 @ 14:15)                              11.8<L>                         17.36<H>  )----------------(  189        --    % Neutrophils, --    % Lymphocytes, ANC: --                                  33.9<L>    BMP (12-24 @ 14:15)             135     |  100     |  39<H> 		Ca++ --      Ca 8.4                ---------------------------------( 216<H>		Mg 2.00               3.9     |  21<L>   |  1.83<H>			Ph 2.6     BMP (12-24 @ 06:53)             135     |  102     |  34<H> 		Ca++ --      Ca 8.1<L>             ---------------------------------( 188<H>		Mg 1.90               4.0     |  20<L>   |  1.72<H>			Ph 2.7       LFTs (12-24 @ 14:15)      TPro 7.1 / Alb 3.4 / TBili 1.1 / DBili -- / AST 39 / ALT 32 / AlkPhos 57  LFTs (12-24 @ 06:53)      TPro 6.6 / Alb 3.0<L> / TBili 1.0 / DBili -- / AST 36 / ALT 25 / AlkPhos 46    Coags (12-23 @ 15:49)  aPTT 31.5 / INR 1.31<H> / PT 14.7<H>    Cardiac Markers (12-24 @ 00:35)     HSTrop: -- / CKMB: -- / CK: 348    ABG (12-24 @ 14:15)      /  /  /  /  / %     Lactate:  1.6    VBG (12-24 @ 21:38)     7.37 / 38<L> / 45 / 22 / -2.9<L> / 67.7%     Lactate: 1.7  VBG (12-23 @ 15:49)     7.34 / 44 / 28 / 24 / -2.2<L> / 20.5<L>%     Lactate: 2.3<H>    --------------------------------------------------------------------------------------------    MICROBIOLOGY  Urinalysis (12-24 @ 14:15):     Color:  / Appearance:  / SG:  / pH:  / Gluc: 216<H> / Ketones:  / Bili:  / Urobili:  / Protein : / Nitrites:  / Leuk.Est:  / RBC:  / WBC:  / Sq Epi:  / Non Sq Epi:  / Bacteria          --------------------------------------------------------------------------------------------    IMAGING

## 2023-12-24 NOTE — PROGRESS NOTE ADULT - ASSESSMENT
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       Problem/Plan - 1:  ·  Problem: 2019 novel coronavirus disease (COVID-19).   ·  Plan: - Patient COVID19 positive with complaints of dry cough, URI symptoms, fevers to 101.5 at home, generalized malaise with poor oral intake, and diarrhea  - Here COVID19 positive, CXR with bibasilar atelectasis but no consolidation, saturating well on RA  - Given MABLE will hold off on remdesivir for now, no current indication for dexamethasone as patient saturating well on RA  - Monitor COVID19 inflammatory labs  - Monitor respiratory status, monitor fever curve  - Has bibasilar crackles on.     Problem/Plan - 2:  ·  Problem: Sepsis, unspecified organism.  ·  Plan: - Meets sepsis criteria with leukocytosis, elevated respiratory rate, COVID19+ swap  - Lactate mildly elevated, s/p 1L in ED -> repeat lactate in AM  - No complaints of  issues -> will hold off on UCx for now  - Given bandemia would check BCx x2  - Diarrhea also likely 2/2 COVID19, monitor for additional episodes  - Trend leukocytosis/bandemia, monitor fever curve, monitor respiratory rate.     Problem/Plan - 3:  ·  Problem: Acute chest pain.  ·  Plan: - New onset chest pain, bilateral, pinprick sensation, only with coughing no chest pain when not coughing, no chest pain with exertion  - Patient denies dizziness, palpitations, syncope  - Troponin here indeterminant but stable compared to prior levels  - EKG non-ischemic  - Given history of CAD with recent stent in May 2022 will monitor on telemetry for now, repeat troponin in AM, check TTE, check TSH, lipid profile, A1C in AM  - Cards help appreciated.   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  -CTA pending asd dimer also high.      Problem/Plan - 4:  ·  Problem: Acute renal failure superimposed on chronic kidney disease.  ·  Plan: - Baseline creatinine 1.2 - 1.3 as per family  - Currently has Cr 1.99 -> 1.77  - Likely 2/2 sepsis 2/2 COVID 19 with increased insensible losses and poor oral intake  - Will place on additional LR at 100 cc/hr fr 10 hrs  - Check UA, check urine sodium/creatinine ratio  - Trend BUN/Cr  - Condom catheter to allow for I/O monitoring  - Renal help appreciated.      Problem/Plan - 5:  ·  Problem: Toxic metabolic encephalopathy.  ·  Plan: - Family reports patient with worsening memory over the past ~6 months, now worsened acutely over the past few days  - Here on examination is AAO x2 (to self and place, not to time) which seems to be his new baseline over the past 6  months  - Likely has worsening confusion 2/2 age and sepsis with COVID19  - Likely outpatient follow up with his PCP for evaluation of his worsening memory issues.     Problem/Plan - 6:  ·  Problem: Type 2 diabetes mellitus with hyperglycemia.  ·  Plan: - Restart home insulin therapy but at decreased dosing -> lantus 18U qHS and admelog 2U qAC  - Monitor FS, low dose ISS qAC, CC diet.     Problem/Plan - 7:  ·  Problem: CAD (coronary artery disease).  ·  Plan: - c/w aspirin and brilinta  - Chest pain work up as above.     Problem/Plan - 8:  ·  Problem: Essential hypertension.  ·  Plan: - c/w metoprolol.     Problem/Plan - 9:  ·  Problem: Hyperlipidemia.  ·  Plan: - c/w statin therapy with therapeutic interchange.     Problem/Plan - 10:  ·  Problem: Myoclonic jerking.  ·  Plan; - c/w keppra  - Fall precautions.     Problem/Plan - 11:  ·  Problem: History of CVA (cerebrovascular accident).   ·  Plan: - c/w aspirin, statin therapy.     Problem/Plan - 12:  ·  Problem: Abdominal Distension; .   ·  Plan: CT abdomen pending.   Surgery following   Fall and aspiration precautions.    D/W patient's grand son and son in detail his critical condition and treatment plan.

## 2023-12-24 NOTE — CONSULT NOTE ADULT - SUBJECTIVE AND OBJECTIVE BOX
Aashish Boyer MD  Interventional Cardiology / Endovascular Specialist  [place] Office : [address] [place] [address] [place] [address]  Tel:   [place] Office : [address] [facility] [place] 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 Patient states that he has been having a dry cough for the past week, worsened over the past few days. Denies SOB with the cough, denies hemoptysis. Reports fevers at home to 101.5 with associated diaphoresis. Said that he has significant pinprick like b/l chest pain with his cough but denies any chest pain when not coughing or with ambulation. Also reports rhinorrhea and sore throat. Reports generalized weakness and poor appetite. States his daughter was visiting him over the week end last week and she was positive for COVID19. Patient tested positive for COVID19 2 days prior and was started on paxlovid as outpatient. Of note, family states that the patient has had worsening confusion at home over the past 6 months (becoming disoriented to time) but recently has been more confused, talking about seeing people that are not present.     On arrival to the ED his vitals were T 98 -> 99.2, P 80, /72, RR 18, ) sat 100% RA. His lab work showed leukocytosis with neutrophilia and bandemia, MABLE, mild hyponatremia, indeterminant but stable troponins, and elevated lactate to 2.3. His COVID19 swab was positive. His CXR showed bibasilar crackles.     Today pt complaining of Abd distension and pain     PAST MEDICAL & SURGICAL HISTORY:  Hyperlipemia      Hypertension      Coronary Artery Disease      Diabetes Mellitus Type II      Stented Coronary Artery  total 5 stents, last stent 5/2019      Neuropathy      Myocardial infarction      Stroke  mild left facial numbness   no other residuals verbalized      Myoclonic jerking      Stage 3 chronic kidney disease      History of Cataract Extraction      Hx of CABG      H/O coronary angiogram      S/P coronary artery stent placement  1/6/09      S/P placement of cardiac pacemaker          SOCIAL HISTORY: Substance Use (street drugs): ( x ) never used  (  ) other:    FAMILY HISTORY:  No pertinent family history in first degree relatives      MEDICATIONS:  aspirin enteric coated 81 milliGRAM(s) Oral daily  enoxaparin Injectable 30 milliGRAM(s) SubCutaneous every 24 hours  metoprolol succinate ER 25 milliGRAM(s) Oral every 12 hours  ranolazine 500 milliGRAM(s) Oral two times a day  ticagrelor 60 milliGRAM(s) Oral every 12 hours      guaiFENesin Oral Liquid (Sugar-Free) 100 milliGRAM(s) Oral every 6 hours PRN    acetaminophen     Tablet .. 650 milliGRAM(s) Oral every 6 hours PRN  escitalopram 10 milliGRAM(s) Oral daily  gabapentin 100 milliGRAM(s) Oral two times a day  levETIRAcetam 250 milliGRAM(s) Oral two times a day  melatonin 3 milliGRAM(s) Oral at bedtime PRN  memantine 5 milliGRAM(s) Oral two times a day  ondansetron Injectable 4 milliGRAM(s) IV Push every 8 hours PRN    aluminum hydroxide/magnesium hydroxide/simethicone Suspension 30 milliLiter(s) Oral every 4 hours PRN    atorvastatin 80 milliGRAM(s) Oral at bedtime  dextrose 50% Injectable 25 Gram(s) IV Push once  dextrose 50% Injectable 25 Gram(s) IV Push once  dextrose 50% Injectable 12.5 Gram(s) IV Push once  dextrose Oral Gel 15 Gram(s) Oral once PRN  glucagon  Injectable 1 milliGRAM(s) IntraMuscular once  insulin glargine Injectable (LANTUS) 18 Unit(s) SubCutaneous at bedtime  insulin lispro (ADMELOG) corrective regimen sliding scale   SubCutaneous three times a day before meals  insulin lispro (ADMELOG) corrective regimen sliding scale   SubCutaneous at bedtime  insulin lispro Injectable (ADMELOG) 2 Unit(s) SubCutaneous before dinner    dextrose 5%. 1000 milliLiter(s) IV Continuous <Continuous>  dextrose 5%. 1000 milliLiter(s) IV Continuous <Continuous>  lactated ringers. 1000 milliLiter(s) IV Continuous <Continuous>  lactated ringers. 1000 milliLiter(s) IV Continuous <Continuous>      FAMILY HISTORY:  No pertinent family history in first degree relatives          Allergies    fluoroquinolone antibiotics (Other)  Cipro (Unknown)  Tegretol (Unknown)  carbamazepine (Other)    Intolerances    	      PHYSICAL EXAM:  T(C): 36.7 (12-24-23 @ 10:30), Max: 37.3 (12-23-23 @ 20:31)  HR: 89 (12-24-23 @ 10:30) (84 - 89)  BP: 121/62 (12-24-23 @ 10:30) (121/62 - 158/52)  RR: 18 (12-24-23 @ 10:30) (16 - 22)  SpO2: 98% (12-24-23 @ 10:30) (97% - 100%)  Wt(kg): --  I&O's Summary      GENERAL: in pain   EYES: conjunctiva and sclera clear  ENMT: No tonsillar erythema, exudates, or enlargement  Cardiovascular: Normal S1 S2, No JVD, 2/6 systolic murmur   Respiratory: basal creps + 	  Gastrointestinal:  Distended , tender in all 4 quadrants 	  Extremities: No edema        LABS:	 	    CARDIAC MARKERS:                                  11.8   17.36 )-----------( 189      ( 24 Dec 2023 14:15 )             33.9     12-24    135  |  100  |  39<H>  ----------------------------<  216<H>  3.9   |  21<L>  |  1.83<H>    Ca    8.4      24 Dec 2023 14:15  Phos  2.6     12-24  Mg     2.00     12-24    TPro  7.1  /  Alb  3.4  /  TBili  1.1  /  DBili  x   /  AST  39  /  ALT  32  /  AlkPhos  57  12-24    proBNP:   Lipid Profile:   HgA1c:   TSH: Thyroid Stimulating Hormone, Serum: 1.27 uIU/mL (12-24 @ 06:53)      Consultant(s) Notes Reviewed:  [x ] YES  [ ] NO    Care Discussed with Consultants/Other Providers [ x] YES  [ ] NO    Imaging Personally Reviewed independently:  [x] YES  [ ] NO    All labs, radiologic studies, vitals, orders and medications list reviewed. Patient is seen and examined at bedside. Case discussed with medical team.    ASSESSMENT/PLAN: 	   Aashish Boyer MD  Interventional Cardiology / Endovascular Specialist  [place] Office : [address] [place] [address] [place] [address]  Tel:   [place] Office : [address] [facility] [place] [place] 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 Patient states that he has been having a dry cough for the past week, worsened over the past few days. Denies SOB with the cough, denies hemoptysis. Reports fevers at home to 101.5 with associated diaphoresis. Said that he has significant pinprick like b/l chest pain with his cough but denies any chest pain when not coughing or with ambulation. Also reports rhinorrhea and sore throat. Reports generalized weakness and poor appetite. States his daughter was visiting him over the week end last week and she was positive for COVID19. Patient tested positive for COVID19 2 days prior and was started on paxlovid as outpatient. Of note, family states that the patient has had worsening confusion at home over the past 6 months (becoming disoriented to time) but recently has been more confused, talking about seeing people that are not present.     On arrival to the ED his vitals were T 98 -> 99.2, P 80, /72, RR 18, ) sat 100% RA. His lab work showed leukocytosis with neutrophilia and bandemia, MABLE, mild hyponatremia, indeterminant but stable troponins, and elevated lactate to 2.3. His COVID19 swab was positive. His CXR showed bibasilar crackles.     Today pt complaining of Abd distension and pain     PAST MEDICAL & SURGICAL HISTORY:  Hyperlipemia      Hypertension      Coronary Artery Disease      Diabetes Mellitus Type II      Stented Coronary Artery  total 5 stents, last stent 5/2019      Neuropathy      Myocardial infarction      Stroke  mild left facial numbness   no other residuals verbalized      Myoclonic jerking      Stage 3 chronic kidney disease      History of Cataract Extraction      Hx of CABG      H/O coronary angiogram      S/P coronary artery stent placement  1/6/09      S/P placement of cardiac pacemaker          SOCIAL HISTORY: Substance Use (street drugs): ( x ) never used  (  ) other:    FAMILY HISTORY:  No pertinent family history in first degree relatives      MEDICATIONS:  aspirin enteric coated 81 milliGRAM(s) Oral daily  enoxaparin Injectable 30 milliGRAM(s) SubCutaneous every 24 hours  metoprolol succinate ER 25 milliGRAM(s) Oral every 12 hours  ranolazine 500 milliGRAM(s) Oral two times a day  ticagrelor 60 milliGRAM(s) Oral every 12 hours      guaiFENesin Oral Liquid (Sugar-Free) 100 milliGRAM(s) Oral every 6 hours PRN    acetaminophen     Tablet .. 650 milliGRAM(s) Oral every 6 hours PRN  escitalopram 10 milliGRAM(s) Oral daily  gabapentin 100 milliGRAM(s) Oral two times a day  levETIRAcetam 250 milliGRAM(s) Oral two times a day  melatonin 3 milliGRAM(s) Oral at bedtime PRN  memantine 5 milliGRAM(s) Oral two times a day  ondansetron Injectable 4 milliGRAM(s) IV Push every 8 hours PRN    aluminum hydroxide/magnesium hydroxide/simethicone Suspension 30 milliLiter(s) Oral every 4 hours PRN    atorvastatin 80 milliGRAM(s) Oral at bedtime  dextrose 50% Injectable 25 Gram(s) IV Push once  dextrose 50% Injectable 25 Gram(s) IV Push once  dextrose 50% Injectable 12.5 Gram(s) IV Push once  dextrose Oral Gel 15 Gram(s) Oral once PRN  glucagon  Injectable 1 milliGRAM(s) IntraMuscular once  insulin glargine Injectable (LANTUS) 18 Unit(s) SubCutaneous at bedtime  insulin lispro (ADMELOG) corrective regimen sliding scale   SubCutaneous three times a day before meals  insulin lispro (ADMELOG) corrective regimen sliding scale   SubCutaneous at bedtime  insulin lispro Injectable (ADMELOG) 2 Unit(s) SubCutaneous before dinner    dextrose 5%. 1000 milliLiter(s) IV Continuous <Continuous>  dextrose 5%. 1000 milliLiter(s) IV Continuous <Continuous>  lactated ringers. 1000 milliLiter(s) IV Continuous <Continuous>  lactated ringers. 1000 milliLiter(s) IV Continuous <Continuous>      FAMILY HISTORY:  No pertinent family history in first degree relatives          Allergies    fluoroquinolone antibiotics (Other)  Cipro (Unknown)  Tegretol (Unknown)  carbamazepine (Other)    Intolerances    	      PHYSICAL EXAM:  T(C): 36.7 (12-24-23 @ 10:30), Max: 37.3 (12-23-23 @ 20:31)  HR: 89 (12-24-23 @ 10:30) (84 - 89)  BP: 121/62 (12-24-23 @ 10:30) (121/62 - 158/52)  RR: 18 (12-24-23 @ 10:30) (16 - 22)  SpO2: 98% (12-24-23 @ 10:30) (97% - 100%)  Wt(kg): --  I&O's Summary      GENERAL: in pain   EYES: conjunctiva and sclera clear  ENMT: No tonsillar erythema, exudates, or enlargement  Cardiovascular: Normal S1 S2, No JVD, 2/6 systolic murmur   Respiratory: basal creps + 	  Gastrointestinal:  Distended , tender in all 4 quadrants 	  Extremities: No edema        LABS:	 	    CARDIAC MARKERS:                                  11.8   17.36 )-----------( 189      ( 24 Dec 2023 14:15 )             33.9     12-24    135  |  100  |  39<H>  ----------------------------<  216<H>  3.9   |  21<L>  |  1.83<H>    Ca    8.4      24 Dec 2023 14:15  Phos  2.6     12-24  Mg     2.00     12-24    TPro  7.1  /  Alb  3.4  /  TBili  1.1  /  DBili  x   /  AST  39  /  ALT  32  /  AlkPhos  57  12-24    proBNP:   Lipid Profile:   HgA1c:   TSH: Thyroid Stimulating Hormone, Serum: 1.27 uIU/mL (12-24 @ 06:53)      Consultant(s) Notes Reviewed:  [x ] YES  [ ] NO    Care Discussed with Consultants/Other Providers [ x] YES  [ ] NO    Imaging Personally Reviewed independently:  [x] YES  [ ] NO    All labs, radiologic studies, vitals, orders and medications list reviewed. Patient is seen and examined at bedside. Case discussed with medical team.    ASSESSMENT/PLAN:

## 2023-12-24 NOTE — CONSULT NOTE ADULT - ASSESSMENT
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) who presented to the hospital for COVID19 infection    Plan:  - labs demonstrate stable WBC, stable H&H, normal lactate 1.6  - pending CT angio abd/pelvis - nephrology ok with IV contrast  - attempted NGT unsuccessfully- will retry pending results of CT scan  - keep NPO  - will follow up results    B Team Surgery   [id number]   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) who presented to the hospital for COVID19 infection    Plan:  - labs demonstrate stable WBC, stable H&H, normal lactate 1.6  - pending CT angio abd/pelvis - nephrology ok with IV contrast  - attempted NGT unsuccessfully- will retry pending results of CT scan  - keep NPO  - will follow up results    B Team Surgery   [id number]   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 CTA demonstrating mesenteric fat stranding associated with ascending/transverse colon. Given exam and imaging findings, plan to take patient to OR with vascular surgery for diagnostic laparoscopy.     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 CTA demonstrating mesenteric fat stranding associated with ascending/transverse colon. Given exam and imaging findings, plan to take patient to OR with vascular surgery for diagnostic laparoscopy.     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 CTA demonstrating mesenteric fat stranding associated with ascending/transverse colon.     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 CTA demonstrating mesenteric fat stranding associated with ascending/transverse colon.     B Team Surgery   [id number]

## 2023-12-24 NOTE — PHYSICAL THERAPY INITIAL EVALUATION ADULT - PERTINENT HX OF CURRENT PROBLEM, REHAB EVAL
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

## 2023-12-24 NOTE — PATIENT PROFILE ADULT - FUNCTIONAL SCREEN CURRENT LEVEL: COMMUNICATION, MLM
pt has been having hallucinations recently, confused/0 = understands/communicates without difficulty

## 2023-12-24 NOTE — CHART NOTE - NSCHARTNOTEFT_GEN_A_CORE
Patient evaluated at bedside, grandson present. Patient is A&Ox2 (person, place) with c/o abdominal pain and distension. Does not recall last bowel movement. Denies passing flatus. Ate breakfast this morning. Denies chest pain, shortness of breath, vision changes, nausea, vomiting, diarrhea, numbness, or tingling.     ICU Vital Signs Last 24 Hrs  T(C): 36.6 (24 Dec 2023 06:40), Max: 37.3 (23 Dec 2023 20:31)  T(F): 97.8 (24 Dec 2023 06:40), Max: 99.2 (23 Dec 2023 20:31)  HR: 88 (24 Dec 2023 06:40) (80 - 88)  BP: 137/55 (24 Dec 2023 06:40) (137/55 - 171/72)  BP(mean): --  ABP: --  ABP(mean): --  RR: 16 (24 Dec 2023 06:40) (16 - 22)  SpO2: 97% (24 Dec 2023 06:40) (97% - 100%)    O2 Parameters below as of 24 Dec 2023 06:40  Patient On (Oxygen Delivery Method): room air     CONSTITUTIONAL: laying in bed, uncomfortable  EYES: PERRLA and symmetric   RESP: b/l lower lobe crackles. No respiratory distress, no use of accessory muscles;   CV: RRR, +S1S2, no MRG   GI: abdomen distended, tender. BS x4 quadrants hypophonic.      NEURO: sensation intact in upper and lower extremities b/l to light touch   PSYCH: A+O x 2 to person and place (not time)     Awaiting nephrology reccs regarding CT contrast given MABLE. Will order STAT AXR at this time and make patient NPO. Discussed with Dr. Dinero. Patient evaluated at bedside, grandson present. Patient is A&Ox2 (person, place) with c/o abdominal pain and distension. Does not recall last bowel movement. Denies passing flatus. Ate breakfast this morning. Denies chest pain, shortness of breath, vision changes, nausea, vomiting, diarrhea, numbness, or tingling.     ICU Vital Signs Last 24 Hrs  T(C): 36.6 (24 Dec 2023 06:40), Max: 37.3 (23 Dec 2023 20:31)  T(F): 97.8 (24 Dec 2023 06:40), Max: 99.2 (23 Dec 2023 20:31)  HR: 88 (24 Dec 2023 06:40) (80 - 88)  BP: 137/55 (24 Dec 2023 06:40) (137/55 - 171/72)  BP(mean): --  ABP: --  ABP(mean): --  RR: 16 (24 Dec 2023 06:40) (16 - 22)  SpO2: 97% (24 Dec 2023 06:40) (97% - 100%)    O2 Parameters below as of 24 Dec 2023 06:40  Patient On (Oxygen Delivery Method): room air     CONSTITUTIONAL: laying in bed, uncomfortable  EYES: PERRLA and symmetric   RESP: b/l lower lobe crackles. No respiratory distress, no use of accessory muscles;   CV: RRR, +S1S2, no MRG   GI: abdomen distended, tender. BS x4 quadrants hypophonic.      NEURO: sensation intact in upper and lower extremities b/l to light touch   PSYCH: A+O x 2 to person and place (not time)     Awaiting nephrology reccs regarding CT contrast given MABLE. Will order STAT AXR at this time and make patient NPO. Discussed with Dr. Dinero.     Addendum:   Abdominal XR imaging and case discussed with general surgery, will follow up reccs. CT A/P ordered w/ PO + IV contrast. Cleared by renal. Patient evaluated at bedside, grandson present. Patient is A&Ox2 (person, place) with c/o abdominal pain and distension. Does not recall last bowel movement. Denies passing flatus. Ate breakfast this morning. Denies chest pain, shortness of breath, vision changes, nausea, vomiting, diarrhea, numbness, or tingling.     ICU Vital Signs Last 24 Hrs  T(C): 36.6 (24 Dec 2023 06:40), Max: 37.3 (23 Dec 2023 20:31)  T(F): 97.8 (24 Dec 2023 06:40), Max: 99.2 (23 Dec 2023 20:31)  HR: 88 (24 Dec 2023 06:40) (80 - 88)  BP: 137/55 (24 Dec 2023 06:40) (137/55 - 171/72)  BP(mean): --  ABP: --  ABP(mean): --  RR: 16 (24 Dec 2023 06:40) (16 - 22)  SpO2: 97% (24 Dec 2023 06:40) (97% - 100%)    O2 Parameters below as of 24 Dec 2023 06:40  Patient On (Oxygen Delivery Method): room air     CONSTITUTIONAL: laying in bed, uncomfortable  EYES: PERRLA and symmetric   RESP: b/l lower lobe crackles. No respiratory distress, no use of accessory muscles;   CV: RRR, +S1S2, no MRG   GI: abdomen distended, tender. BS x4 quadrants hypophonic.      NEURO: sensation intact in upper and lower extremities b/l to light touch   PSYCH: A+O x 2 to person and place (not time)     Awaiting nephrology reccs regarding CT contrast given MABLE. Will order STAT AXR at this time and make patient NPO. Discussed with Dr. Dinero.     Addendum:   Abdominal XR imaging and case discussed with general surgery, will follow up reccs. CT A/P ordered w/ PO + IV contrast. Cleared by renal. Will continue with IVF at current rate. Patient evaluated at bedside, grandson present. Patient is A&Ox2 (person, place) with c/o abdominal pain and distension. Does not recall last bowel movement. Denies passing flatus. Ate breakfast this morning. Denies chest pain, shortness of breath, vision changes, nausea, vomiting, diarrhea, numbness, or tingling.     ICU Vital Signs Last 24 Hrs  T(C): 36.6 (24 Dec 2023 06:40), Max: 37.3 (23 Dec 2023 20:31)  T(F): 97.8 (24 Dec 2023 06:40), Max: 99.2 (23 Dec 2023 20:31)  HR: 88 (24 Dec 2023 06:40) (80 - 88)  BP: 137/55 (24 Dec 2023 06:40) (137/55 - 171/72)  BP(mean): --  ABP: --  ABP(mean): --  RR: 16 (24 Dec 2023 06:40) (16 - 22)  SpO2: 97% (24 Dec 2023 06:40) (97% - 100%)    O2 Parameters below as of 24 Dec 2023 06:40  Patient On (Oxygen Delivery Method): room air     CONSTITUTIONAL: laying in bed, uncomfortable  EYES: PERRLA and symmetric   RESP: b/l lower lobe crackles. No respiratory distress, no use of accessory muscles;   CV: RRR, +S1S2, no MRG   GI: abdomen distended, tender. BS x4 quadrants hypophonic.      NEURO: sensation intact in upper and lower extremities b/l to light touch   PSYCH: A+O x 2 to person and place (not time)     Awaiting nephrology reccs regarding CT contrast given MABLE. Will order STAT AXR at this time and make patient NPO. Discussed with Dr. Dinero.     Addendum:   Abdominal XR imaging and case discussed with general surgery, will follow up reccs. CT A/P ordered w/ PO + IV contrast. Cleared by renal. Will continue with IVF at current rate. CT expedited. Patient evaluated at bedside, grandson present. Patient is A&Ox2 (person, place) with c/o abdominal pain and distension. Does not recall last bowel movement. Denies passing flatus. Ate breakfast this morning. Denies chest pain, shortness of breath, vision changes, nausea, vomiting, diarrhea, numbness, or tingling.     ICU Vital Signs Last 24 Hrs  T(C): 36.6 (24 Dec 2023 06:40), Max: 37.3 (23 Dec 2023 20:31)  T(F): 97.8 (24 Dec 2023 06:40), Max: 99.2 (23 Dec 2023 20:31)  HR: 88 (24 Dec 2023 06:40) (80 - 88)  BP: 137/55 (24 Dec 2023 06:40) (137/55 - 171/72)  BP(mean): --  ABP: --  ABP(mean): --  RR: 16 (24 Dec 2023 06:40) (16 - 22)  SpO2: 97% (24 Dec 2023 06:40) (97% - 100%)    O2 Parameters below as of 24 Dec 2023 06:40  Patient On (Oxygen Delivery Method): room air     CONSTITUTIONAL: laying in bed, uncomfortable  EYES: PERRLA and symmetric   RESP: b/l lower lobe crackles. No respiratory distress, no use of accessory muscles;   CV: RRR, +S1S2, no MRG   GI: abdomen distended, tender. BS x4 quadrants hypophonic.      NEURO: sensation intact in upper and lower extremities b/l to light touch   PSYCH: A+O x 2 to person and place (not time)     Awaiting nephrology reccs regarding CT contrast given MABLE. Will order STAT AXR at this time and make patient NPO. Discussed with Dr. Dinero.     Addendum:   Abdominal XR imaging and case discussed with general surgery, will follow up reccs. CT A/P ordered w/ PO + IV contrast. Cleared by renal. Will continue with IVF at current rate. CT expedited. ID also consulted. Patient evaluated at bedside, grandson present. Patient is A&Ox2 (person, place) with c/o abdominal pain and distension. Does not recall last bowel movement. Denies passing flatus. Ate breakfast this morning. Denies chest pain, shortness of breath, vision changes, nausea, vomiting, diarrhea, numbness, or tingling.     ICU Vital Signs Last 24 Hrs  T(C): 36.6 (24 Dec 2023 06:40), Max: 37.3 (23 Dec 2023 20:31)  T(F): 97.8 (24 Dec 2023 06:40), Max: 99.2 (23 Dec 2023 20:31)  HR: 88 (24 Dec 2023 06:40) (80 - 88)  BP: 137/55 (24 Dec 2023 06:40) (137/55 - 171/72)  BP(mean): --  ABP: --  ABP(mean): --  RR: 16 (24 Dec 2023 06:40) (16 - 22)  SpO2: 97% (24 Dec 2023 06:40) (97% - 100%)    O2 Parameters below as of 24 Dec 2023 06:40  Patient On (Oxygen Delivery Method): room air     CONSTITUTIONAL: laying in bed, uncomfortable  EYES: PERRLA and symmetric   RESP: b/l lower lobe crackles. No respiratory distress, no use of accessory muscles;   CV: RRR, +S1S2, no MRG   GI: abdomen distended, tender. BS x4 quadrants hypophonic.      NEURO: sensation intact in upper and lower extremities b/l to light touch   PSYCH: A+O x 2 to person and place (not time)     Awaiting nephrology reccs regarding CT contrast given MABLE. Will order STAT AXR at this time and make patient NPO. Discussed with Dr. Dinero.     Addendum:   Abdominal XR imaging and case discussed with general surgery, will follow up reccs. CT A/P ordered w/ PO + IV contrast. Cleared by renal. Will continue with IVF at current rate. CT expedited. ID also consulted, will f/u reccs.

## 2023-12-24 NOTE — CHART NOTE - NSCHARTNOTEFT_GEN_A_CORE
CT expedited. CT expedited.   Per discussion with radiology, CTA Chest and CTA A/P cannot be performed at the same time. At this time will keep CTA A/P and will order CT Chest w/ IV Contrast. Discussed with Dr. Dinero.

## 2023-12-24 NOTE — CONSULT NOTE ADULT - ASSESSMENT
EKG SR RBBB (old per family     Echo < from: Transthoracic Echocardiogram (05.23.22 @ 08:21) >  1. Mitral annular calcification, otherwise normal mitral  valve. Mild-moderate mitral regurgitation.  2. Aortic valve leaflet morphology not well visualized.  Peak transaortic valve gradient equals 18 mm Hg, mean  transaortic valve gradient equals 10 mm Hg, consistent with  mild aortic stenosis. Minimal aortic regurgitation.  3. Mildly dilated left atrium.  LA volume index = 41 cc/m2.  4. Normal left ventricular internal dimensions and wall  thicknesses.  5. Endocardium not well visualized; grossly normal left  ventricular systolic function.  6. Mild diastolic dysfunction (Stage I).  7. The right ventricle is not well visualized;grossly  normal right ventricular systolic function.    < end of copied text >    Assessment and Plan     1) CAD s/p CABG : EKG non ischemic , trop -sera   - echo pending , c.w asa can hold brilinta till CT abd done   - c/w metoprolol      2) Covid +  - will get echo   - c/w supportive management   - t/t per primary team     3) Abd distension   - Surgery on board   - CT abd pending     DVT PPX lovenox

## 2023-12-24 NOTE — CONSULT NOTE ADULT - ASSESSMENT
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  I suspect need CT with oral contrast(but have no issues with IV contrast if required)  Will get renal anatomy with the CT scan but obstruction unlikely with delong   Trend serum creatinine   2 Abd- NPO at present and urgent CT today   3 -Delong to gravity     DW ACP     Sayed 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  I suspect need CT with oral contrast(but have no issues with IV contrast if required)  Will get renal anatomy with the CT scan but obstruction unlikely with delong   Trend serum creatinine   2 Abd- NPO at present and urgent CT today   3 -Delong to gravity     DW ACP     Sayed [facility]   [place] [facility]   [phone]

## 2023-12-24 NOTE — PROGRESS NOTE ADULT - SUBJECTIVE AND OBJECTIVE BOX
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 Alternatives are medical management with heparin  pt and family understand and agree to proceed with angio and possible stent  all the questions were answered

## 2023-12-24 NOTE — BRIEF OPERATIVE NOTE - OPERATION/FINDINGS
diagnostic laparoscopy, small bowel run distal to proximal and bowel healthy and viable, ascending and transvse colon appeared healthy, inflammation of omentum in RUQ  See vascular op note for their portion of case diagnostic laparoscopy, small bowel run distal to proximal and bowel healthy and viable, ascending and transverse colon appeared healthy, inflammation of omentum in RUQ  See vascular op note for their portion of case

## 2023-12-24 NOTE — PHYSICAL THERAPY INITIAL EVALUATION ADULT - NSPTDISCHREC_GEN_A_CORE
Home with no skilled PT needs. Pt to remain on PT program at [facility]. Home with no skilled PT needs. Pt to remain on PT program at [facility].

## 2023-12-24 NOTE — CONSULT NOTE ADULT - ASSESSMENT
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 Appears to be acute on chronic disease with plaque rupture. Rebound tenderness on exam with gaurding. CT with ischemic changes to ascending colon.     Plan to move forward with exploratory laparoscopy, possible exlap, mesenteric stent.     High risks reviewed with family who are in agreement with moving forward with surgery.     D/W Dr. Florentino

## 2023-12-24 NOTE — PATIENT PROFILE ADULT - NSPROEXTENSIONSOFSELF_GEN_A_NUR
pt has been using wheelchair last 3 weeks, pt able to stand and pivot with assistance/walker/wheelchair

## 2023-12-24 NOTE — CONSULT NOTE ADULT - ATTENDING COMMENTS
reason for consult:  abd pain    The active issues are:  1. COVID related hypercoagulable state/dehydration induced colitis    Suggest gentle hydration, therapeutic anti-coagulation, empiric antibiotics to cover enteric organisms and exploration of goals of care with family.  The patient does not have any clear or urgent indications for surgery but it would be good to address this up front given advanced age and frailty.  Also, he demonstrates an enlarged stomach bubble that increases his aspiration risk, especially since he did not tolerate heroic attempts to insert NGT for decompression, consider reglan or other promotility agent.    The Acute Care Surgery (B Team) Practice:    urgent issues - spectra [id number]  nonurgent issues - [phone]  patient appointments or afterhours - [phone] reason for consult:  abd pain    The active issues are:  1. COVID related hypercoagulable state/dehydration induced colitis    Suggest gentle hydration, therapeutic anti-coagulation, empiric antibiotics to cover enteric organisms and exploration of goals of care with family.  The patient does not have any clear or urgent indications for surgery but it would be good to address this up front given advanced age and frailty.  Also, he demonstrates an enlarged stomach bubble that increases his aspiration risk, especially since he did not tolerate heroic attempts to insert NGT for decompression, consider reglan or other promotility agent.    The Acute Care Surgery (B Team) Practice:    urgent issues - spectra [phone]  nonurgent issues - [phone]  patient appointments or afterhours - [phone] reason for consult:  abd pain    additionally, CT scan reviewed    The active issues are:  1. COVID related hypercoagulable state/dehydration induced colitis    Suggest gentle hydration, therapeutic anti-coagulation, empiric antibiotics to cover enteric organisms and exploration of goals of care with family.  The patient does not have any clear or urgent indications for surgery but it would be good to address this up front given advanced age and frailty.  Also, he demonstrates an enlarged stomach bubble that increases his aspiration risk, especially since he did not tolerate heroic attempts to insert NGT for decompression, consider reglan or other promotility agent.    The Acute Care Surgery (B Team) Practice:    urgent issues - spectra [phone]  nonurgent issues - [phone]  patient appointments or afterhours - [phone] reason for consult:  abd pain    additionally, CT scan reviewed    The active issues are:  1. COVID related hypercoagulable state/dehydration induced colitis    Suggest gentle hydration, therapeutic anti-coagulation, empiric antibiotics to cover enteric organisms and exploration of goals of care with family.  The patient does not have any clear or urgent indications for surgery but it would be good to address this up front given advanced age and frailty.  Also, he demonstrates an enlarged stomach bubble that increases his aspiration risk, especially since he did not tolerate heroic attempts to insert NGT for decompression, consider reglan or other promotility agent.    The Acute Care Surgery (B Team) Practice:    urgent issues - spectra [phone]  nonurgent issues - [phone]  patient appointments or afterhours - [phone]

## 2023-12-24 NOTE — PHYSICAL THERAPY INITIAL EVALUATION ADULT - LEVEL OF INDEPENDENCE, REHAB EVAL
Deferred further mobility secondary to continuous bowel movements./maximum assist (25% patients effort)

## 2023-12-24 NOTE — PATIENT PROFILE ADULT - FALL HARM RISK - HARM RISK INTERVENTIONS
Assistance with ambulation/Assistance OOB with selected safe patient handling equipment/Communicate Risk of Fall with Harm to all staff/Discuss with provider need for PT consult/Monitor gait and stability/Provide patient with walking aids - walker, cane, crutches/Reinforce activity limits and safety measures with patient and family/Tailored Fall Risk Interventions/Visual Cue: Yellow wristband and red socks/Bed in lowest position, wheels locked, appropriate side rails in place/Call bell, personal items and telephone in reach/Instruct patient to call for assistance before getting out of bed or chair/Non-slip footwear when patient is out of bed/[place] to call system/Physically safe environment - no spills, clutter or unnecessary equipment/Purposeful Proactive Rounding/Room/bathroom lighting operational, light cord in reach Assistance with ambulation/Assistance OOB with selected safe patient handling equipment/Communicate Risk of Fall with Harm to all staff/Discuss with provider need for PT consult/Monitor gait and stability/Provide patient with walking aids - walker, cane, crutches/Reinforce activity limits and safety measures with patient and family/Tailored Fall Risk Interventions/Visual Cue: Yellow wristband and red socks/Bed in lowest position, wheels locked, appropriate side rails in place/Call bell, personal items and telephone in reach/Instruct patient to call for assistance before getting out of bed or chair/Non-slip footwear when patient is out of bed/[place] to call system/Physically safe environment - no spills, clutter or unnecessary equipment/Purposeful Proactive Rounding/Room/bathroom lighting operational, light cord in reach

## 2023-12-24 NOTE — PROGRESS NOTE ADULT - SUBJECTIVE AND OBJECTIVE BOX
Date of Service  : 12-24-23 @ 16:41    INTERVAL HPI/OVERNIGHT EVENTS:  Vital Signs Last 24 Hrs  T(C): 36.7 (24 Dec 2023 10:30), Max: 37.3 (23 Dec 2023 20:31)  T(F): 98 (24 Dec 2023 10:30), Max: 99.2 (23 Dec 2023 20:31)  HR: 89 (24 Dec 2023 10:30) (84 - 89)  BP: 121/62 (24 Dec 2023 10:30) (121/62 - 158/52)  BP(mean): --  RR: 18 (24 Dec 2023 10:30) (16 - 22)  SpO2: 98% (24 Dec 2023 10:30) (97% - 100%)    Parameters below as of 24 Dec 2023 10:30  Patient On (Oxygen Delivery Method): room air      I&O's Summary    MEDICATIONS  (STANDING):  aspirin enteric coated 81 milliGRAM(s) Oral daily  atorvastatin 80 milliGRAM(s) Oral at bedtime  dextrose 5%. 1000 milliLiter(s) (100 mL/Hr) IV Continuous <Continuous>  dextrose 5%. 1000 milliLiter(s) (50 mL/Hr) IV Continuous <Continuous>  dextrose 50% Injectable 25 Gram(s) IV Push once  dextrose 50% Injectable 25 Gram(s) IV Push once  dextrose 50% Injectable 12.5 Gram(s) IV Push once  enoxaparin Injectable 30 milliGRAM(s) SubCutaneous every 24 hours  escitalopram 10 milliGRAM(s) Oral daily  gabapentin 100 milliGRAM(s) Oral two times a day  glucagon  Injectable 1 milliGRAM(s) IntraMuscular once  insulin glargine Injectable (LANTUS) 18 Unit(s) SubCutaneous at bedtime  insulin lispro (ADMELOG) corrective regimen sliding scale   SubCutaneous three times a day before meals  insulin lispro (ADMELOG) corrective regimen sliding scale   SubCutaneous at bedtime  insulin lispro Injectable (ADMELOG) 2 Unit(s) SubCutaneous before dinner  lactated ringers. 1000 milliLiter(s) (100 mL/Hr) IV Continuous <Continuous>  lactated ringers. 1000 milliLiter(s) (100 mL/Hr) IV Continuous <Continuous>  levETIRAcetam 250 milliGRAM(s) Oral two times a day  memantine 5 milliGRAM(s) Oral two times a day  metoprolol succinate ER 25 milliGRAM(s) Oral every 12 hours  piperacillin/tazobactam IVPB. 3.375 Gram(s) IV Intermittent once  piperacillin/tazobactam IVPB.- 3.375 Gram(s) IV Intermittent once  ranolazine 500 milliGRAM(s) Oral two times a day  remdesivir  IVPB   IV Intermittent   remdesivir  IVPB 200 milliGRAM(s) IV Intermittent every 24 hours  ticagrelor 60 milliGRAM(s) Oral every 12 hours    MEDICATIONS  (PRN):  acetaminophen     Tablet .. 650 milliGRAM(s) Oral every 6 hours PRN Temp greater or equal to 38C (100.4F), Mild Pain (1 - 3)  aluminum hydroxide/magnesium hydroxide/simethicone Suspension 30 milliLiter(s) Oral every 4 hours PRN Dyspepsia  dextrose Oral Gel 15 Gram(s) Oral once PRN Blood Glucose LESS THAN 70 milliGRAM(s)/deciliter  guaiFENesin Oral Liquid (Sugar-Free) 100 milliGRAM(s) Oral every 6 hours PRN Cough  melatonin 3 milliGRAM(s) Oral at bedtime PRN Insomnia  ondansetron Injectable 4 milliGRAM(s) IV Push every 8 hours PRN Nausea and/or Vomiting    LABS:                        11.8   17.36 )-----------( 189      ( 24 Dec 2023 14:15 )             33.9     12-24    135  |  100  |  39<H>  ----------------------------<  216<H>  3.9   |  21<L>  |  1.83<H>    Ca    8.4      24 Dec 2023 14:15  Phos  2.6     12-24  Mg     2.00     12-24    TPro  7.1  /  Alb  3.4  /  TBili  1.1  /  DBili  x   /  AST  39  /  ALT  32  /  AlkPhos  57  12-24    PT/INR - ( 23 Dec 2023 15:49 )   PT: 14.7 sec;   INR: 1.31 ratio         PTT - ( 23 Dec 2023 15:49 )  PTT:31.5 sec  Urinalysis Basic - ( 24 Dec 2023 14:15 )    Color: x / Appearance: x / SG: x / pH: x  Gluc: 216 mg/dL / Ketone: x  / Bili: x / Urobili: x   Blood: x / Protein: x / Nitrite: x   Leuk Esterase: x / RBC: x / WBC x   Sq Epi: x / Non Sq Epi: x / Bacteria: x      CAPILLARY BLOOD GLUCOSE      POCT Blood Glucose.: 163 mg/dL (24 Dec 2023 12:11)  POCT Blood Glucose.: 190 mg/dL (24 Dec 2023 09:08)  POCT Blood Glucose.: 227 mg/dL (23 Dec 2023 22:47)  POCT Blood Glucose.: 221 mg/dL (23 Dec 2023 20:36)        Urinalysis Basic - ( 24 Dec 2023 14:15 )    Color: x / Appearance: x / SG: x / pH: x  Gluc: 216 mg/dL / Ketone: x  / Bili: x / Urobili: x   Blood: x / Protein: x / Nitrite: x   Leuk Esterase: x / RBC: x / WBC x   Sq Epi: x / Non Sq Epi: x / Bacteria: x      REVIEW OF SYSTEMS:      Consultant(s) Notes Reviewed:  [x ] YES  [ ] NO    PHYSICAL EXAM:  GENERAL: NAD, well-groomed, well-developed,not in any distress ,  HEAD:  Atraumatic, Normocephalic  EYES: EOMI, PERRLA, conjunctiva and sclera clear  ENMT: No tonsillar erythema, exudates, or enlargement; Moist mucous membranes, Good dentition, No lesions  NECK: Supple, No JVD, Normal thyroid  NERVOUS SYSTEM:  Alert & Oriented X3, No focal deficit   CHEST/LUNG: Good air entry bilateral with no  rales, rhonchi, wheezing, or rubs  HEART: Regular rate and rhythm; No murmurs, rubs, or gallops  ABDOMEN: Soft, Nontender, +distended; Bowel sounds present  EXTREMITIES:  2+ Peripheral Pulses, No clubbing, cyanosis, or edema      Care Discussed with Consultants/Other Providers [ x] YES  [ ] NO

## 2023-12-24 NOTE — CONSULT NOTE ADULT - SUBJECTIVE AND OBJECTIVE BOX
GENERAL SURGERY CONSULT NOTE  --------------------------------------------------------------------------------------------    HPI:   Mr. 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 Per medicine H&P "he has been having a dry cough for the past week, worsened over the past few days. Denies SOB with the cough, denies hemoptysis. Reports fevers at home to 101.5 with associated diaphoresis. Said that he has significant pinprick like b/l chest pain with his cough but denies any chest pain when not coughing or with ambulation. Also reports rhinorrhea and sore throat. Reports generalized weakness and poor appetite. States his daughter was visiting him over the week end last week and she was positive for COVID19. Patient tested positive for COVID19 2 days prior and was started on paxlovid as outpatient. Of note, family states that the patient has had worsening confusion at home over the past 6 months (becoming disoriented to time) but recently has been more confused, talking about seeing people that are not present. ". Surgery was consulted today for increasing abdominal distention with obvious discomfort and some generalized abdominal pain. No nausea or vomiting, patient continues to pass gas and has had multiple bowel movements today. xray demonstrated dilated stomach and small bowel, NGT was attempted but patient adamently refused and pulled at the tube. Labs demonstrate no acute pathology besides known MABLE, and lactate was 1.6. CT scan pending.       ROS: 10-system review is otherwise negative except HPI above.      PAST MEDICAL & SURGICAL HISTORY:  Hyperlipemia  Hypertension  Coronary Artery Disease  Diabetes Mellitus Type II  tented Coronary Artery  total 5 stents, last stent 5/2019  Neuropathy  Myocardial infarction  Stroke  mild left facial numbness   no other residuals verbalized  Myoclonic jerking  Stage 3 chronic kidney disease  History of Cataract Extraction  Hx of CABG  H/O coronary angiogram  S/P coronary artery stent placement  1/6/09  S/P placement of cardiac pacemaker    FAMILY HISTORY:  No pertinent family history in first degree relatives    [] Family history not pertinent as reviewed with the patient and family      ALLERGIES: fluoroquinolone antibiotics (Other)  Cipro (Unknown)  Tegretol (Unknown)  carbamazepine (Other)      SOCIAL HISTORY:  lives with family, nonambulatory at baseline    --------------------------------------------------------------------------------------------    Vitals:   T(C): 36.7 (12-24-23 @ 10:30), Max: 37.3 (12-23-23 @ 20:31)  HR: 89 (12-24-23 @ 10:30) (84 - 89)  BP: 121/62 (12-24-23 @ 10:30) (121/62 - 158/52)  RR: 18 (12-24-23 @ 10:30) (16 - 22)  SpO2: 98% (12-24-23 @ 10:30) (97% - 100%)  CAPILLARY BLOOD GLUCOSE      POCT Blood Glucose.: 163 mg/dL (24 Dec 2023 12:11)  POCT Blood Glucose.: 190 mg/dL (24 Dec 2023 09:08)  POCT Blood Glucose.: 227 mg/dL (23 Dec 2023 22:47)  POCT Blood Glucose.: 221 mg/dL (23 Dec 2023 20:36)      Height (cm): 175.3 (12-24 @ 01:02)  Weight (kg): 79.379 (12-24 @ 01:02)  BMI (kg/m2): 25.8 (12-24 @ 01:02)  BSA (m2): 1.95 (12-24 @ 01:02)    Physical Examination:  GEN: uncomfortable, in some mild distress  NEURO: AAOx2, CN II-XII grossly intact, no focal deficits  PULM: symmetric chest rise bilaterally, no increased WOB  ABD: soft, distended, mild to moderate tenderness to palpation, rebound tenderness in 4 quadrants-worse in RUQ  EXTR: no lower extremity edema, moving all extremities  --------------------------------------------------------------------------------------------    LABS  CBC (12-24 @ 14:15)                              11.8<L>                         17.36<H>  )----------------(  189        --    % Neutrophils, --    % Lymphocytes, ANC: --                                  33.9<L>  CBC (12-24 @ 06:53)                              11.4<L>                         16.61<H>  )----------------(  174        83.5<H>% Neutrophils, 7.9<L>% Lymphocytes, ANC: 13.87<H>                              32.9<L>    BMP (12-24 @ 14:15)             135     |  100     |  39<H> 		Ca++ --      Ca 8.4                ---------------------------------( 216<H>		Mg 2.00               3.9     |  21<L>   |  1.83<H>			Ph 2.6     BMP (12-24 @ 06:53)             135     |  102     |  34<H> 		Ca++ --      Ca 8.1<L>             ---------------------------------( 188<H>		Mg 1.90               4.0     |  20<L>   |  1.72<H>			Ph 2.7       LFTs (12-24 @ 14:15)      TPro 7.1 / Alb 3.4 / TBili 1.1 / DBili -- / AST 39 / ALT 32 / AlkPhos 57  LFTs (12-24 @ 06:53)      TPro 6.6 / Alb 3.0<L> / TBili 1.0 / DBili -- / AST 36 / ALT 25 / AlkPhos 46    Coags (12-23 @ 15:49)  aPTT 31.5 / INR 1.31<H> / PT 14.7<H>    Cardiac Markers (12-24 @ 00:35)     HSTrop: -- / CKMB: -- / CK: 348    ABG (12-24 @ 14:15)      /  /  /  /  / %     Lactate:  1.6    VBG (12-23 @ 15:49)     7.34 / 44 / 28 / 24 / -2.2<L> / 20.5<L>%     Lactate: 2.3<H>    --------------------------------------------------------------------------------------------    MICROBIOLOGY  Urinalysis (12-24 @ 14:15):     Color:  / Appearance:  / SG:  / pH:  / Gluc: 216<H> / Ketones:  / Bili:  / Urobili:  / Protein : / Nitrites:  / Leuk.Est:  / RBC:  / WBC:  / Sq Epi:  / Non Sq Epi:  / Bacteria          --------------------------------------------------------------------------------------------    IMAGING  Pending    --------------------------------------------------------------------------------------------    ASSESSMENT: Patient is a 89y old m with ***    PLAN:  ***  -   -   -   -   - Patient seen/examined with attending.  - Plan to be discussed with Attending,   GENERAL SURGERY CONSULT NOTE  --------------------------------------------------------------------------------------------    HPI:   Mr. 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 Per medicine H&P "he has been having a dry cough for the past week, worsened over the past few days. Denies SOB with the cough, denies hemoptysis. Reports fevers at home to 101.5 with associated diaphoresis. Said that he has significant pinprick like b/l chest pain with his cough but denies any chest pain when not coughing or with ambulation. Also reports rhinorrhea and sore throat. Reports generalized weakness and poor appetite. States his daughter was visiting him over the week end last week and she was positive for COVID19. Patient tested positive for COVID19 2 days prior and was started on paxlovid as outpatient. Of note, family states that the patient has had worsening confusion at home over the past 6 months (becoming disoriented to time) but recently has been more confused, talking about seeing people that are not present. ". Surgery was consulted today for increasing abdominal distention with obvious discomfort and some generalized abdominal pain. No nausea or vomiting, patient continues to pass gas and has had multiple bowel movements today. xray demonstrated dilated stomach and small bowel, NGT was attempted but patient adamently refused and pulled at the tube. Labs demonstrate no acute pathology besides known MABLE, and lactate was 1.6. CT scan pending.       ROS: 10-system review is otherwise negative except HPI above.      PAST MEDICAL & SURGICAL HISTORY:  Hyperlipemia  Hypertension  Coronary Artery Disease  Diabetes Mellitus Type II  tented Coronary Artery  total 5 stents, last stent 5/2019  Neuropathy  Myocardial infarction  Stroke  mild left facial numbness   no other residuals verbalized  Myoclonic jerking  Stage 3 chronic kidney disease  History of Cataract Extraction  Hx of CABG  H/O coronary angiogram  S/P coronary artery stent placement  1/6/09  S/P placement of cardiac pacemaker    FAMILY HISTORY:  No pertinent family history in first degree relatives    [] Family history not pertinent as reviewed with the patient and family      ALLERGIES: fluoroquinolone antibiotics (Other)  Cipro (Unknown)  Tegretol (Unknown)  carbamazepine (Other)      SOCIAL HISTORY:  lives with family, nonambulatory at baseline    --------------------------------------------------------------------------------------------    Vitals:   T(C): 36.7 (12-24-23 @ 10:30), Max: 37.3 (12-23-23 @ 20:31)  HR: 89 (12-24-23 @ 10:30) (84 - 89)  BP: 121/62 (12-24-23 @ 10:30) (121/62 - 158/52)  RR: 18 (12-24-23 @ 10:30) (16 - 22)  SpO2: 98% (12-24-23 @ 10:30) (97% - 100%)  CAPILLARY BLOOD GLUCOSE      POCT Blood Glucose.: 163 mg/dL (24 Dec 2023 12:11)  POCT Blood Glucose.: 190 mg/dL (24 Dec 2023 09:08)  POCT Blood Glucose.: 227 mg/dL (23 Dec 2023 22:47)  POCT Blood Glucose.: 221 mg/dL (23 Dec 2023 20:36)      Height (cm): 175.3 (12-24 @ 01:02)  Weight (kg): 79.379 (12-24 @ 01:02)  BMI (kg/m2): 25.8 (12-24 @ 01:02)  BSA (m2): 1.95 (12-24 @ 01:02)    Physical Examination:  GEN: uncomfortable, in some mild distress  NEURO: AAOx2, CN II-XII grossly intact, no focal deficits  PULM: symmetric chest rise bilaterally, no increased WOB  ABD: soft, distended, mild to moderate tenderness to palpation, rebound tenderness in 4 quadrants-worse in RUQ  EXTR: no lower extremity edema, moving all extremities  --------------------------------------------------------------------------------------------    LABS  CBC (12-24 @ 14:15)                              11.8<L>                         17.36<H>  )----------------(  189        --    % Neutrophils, --    % Lymphocytes, ANC: --                                  33.9<L>  CBC (12-24 @ 06:53)                              11.4<L>                         16.61<H>  )----------------(  174        83.5<H>% Neutrophils, 7.9<L>% Lymphocytes, ANC: 13.87<H>                              32.9<L>    BMP (12-24 @ 14:15)             135     |  100     |  39<H> 		Ca++ --      Ca 8.4                ---------------------------------( 216<H>		Mg 2.00               3.9     |  21<L>   |  1.83<H>			Ph 2.6     BMP (12-24 @ 06:53)             135     |  102     |  34<H> 		Ca++ --      Ca 8.1<L>             ---------------------------------( 188<H>		Mg 1.90               4.0     |  20<L>   |  1.72<H>			Ph 2.7       LFTs (12-24 @ 14:15)      TPro 7.1 / Alb 3.4 / TBili 1.1 / DBili -- / AST 39 / ALT 32 / AlkPhos 57  LFTs (12-24 @ 06:53)      TPro 6.6 / Alb 3.0<L> / TBili 1.0 / DBili -- / AST 36 / ALT 25 / AlkPhos 46    Coags (12-23 @ 15:49)  aPTT 31.5 / INR 1.31<H> / PT 14.7<H>    Cardiac Markers (12-24 @ 00:35)     HSTrop: -- / CKMB: -- / CK: 348    ABG (12-24 @ 14:15)      /  /  /  /  / %     Lactate:  1.6    VBG (12-23 @ 15:49)     7.34 / 44 / 28 / 24 / -2.2<L> / 20.5<L>%     Lactate: 2.3<H>    --------------------------------------------------------------------------------------------    MICROBIOLOGY  Urinalysis (12-24 @ 14:15):     Color:  / Appearance:  / SG:  / pH:  / Gluc: 216<H> / Ketones:  / Bili:  / Urobili:  / Protein : / Nitrites:  / Leuk.Est:  / RBC:  / WBC:  / Sq Epi:  / Non Sq Epi:  / Bacteria          --------------------------------------------------------------------------------------------    IMAGING  Pending    --------------------------------------------------------------------------------------------    ASSESSMENT: Patient is a 89y old m with ***    PLAN:  ***  -   -   -   -   - Patient seen/examined with attending.  - Plan to be discussed with Attending,   GENERAL SURGERY CONSULT NOTE  --------------------------------------------------------------------------------------------    HPI:   Mr. 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 Per medicine H&P "he has been having a dry cough for the past week, worsened over the past few days. Denies SOB with the cough, denies hemoptysis. Reports fevers at home to 101.5 with associated diaphoresis. Said that he has significant pinprick like b/l chest pain with his cough but denies any chest pain when not coughing or with ambulation. Also reports rhinorrhea and sore throat. Reports generalized weakness and poor appetite. States his daughter was visiting him over the week end last week and she was positive for COVID19. Patient tested positive for COVID19 2 days prior and was started on paxlovid as outpatient. Of note, family states that the patient has had worsening confusion at home over the past 6 months (becoming disoriented to time) but recently has been more confused, talking about seeing people that are not present. ". Surgery was consulted today for increasing abdominal distention with obvious discomfort and some generalized abdominal pain. No nausea or vomiting, patient continues to pass gas and has had multiple bowel movements today. xray demonstrated dilated stomach and small bowel, NGT was attempted but patient adamently refused and pulled at the tube. Labs demonstrate no acute pathology besides known MABLE, and lactate was 1.6. CT scan pending.       ROS: 10-system review is otherwise negative except HPI above.      PAST MEDICAL & SURGICAL HISTORY:  Hyperlipemia  Hypertension  Coronary Artery Disease  Diabetes Mellitus Type II  tented Coronary Artery  total 5 stents, last stent 5/2019  Neuropathy  Myocardial infarction  Stroke  mild left facial numbness   no other residuals verbalized  Myoclonic jerking  Stage 3 chronic kidney disease  History of Cataract Extraction  Hx of CABG  H/O coronary angiogram  S/P coronary artery stent placement  1/6/09  S/P placement of cardiac pacemaker    FAMILY HISTORY:  No pertinent family history in first degree relatives    [] Family history not pertinent as reviewed with the patient and family      ALLERGIES: fluoroquinolone antibiotics (Other)  Cipro (Unknown)  Tegretol (Unknown)  carbamazepine (Other)      SOCIAL HISTORY:  lives with family, nonambulatory at baseline    --------------------------------------------------------------------------------------------    Vitals:   T(C): 36.7 (12-24-23 @ 10:30), Max: 37.3 (12-23-23 @ 20:31)  HR: 89 (12-24-23 @ 10:30) (84 - 89)  BP: 121/62 (12-24-23 @ 10:30) (121/62 - 158/52)  RR: 18 (12-24-23 @ 10:30) (16 - 22)  SpO2: 98% (12-24-23 @ 10:30) (97% - 100%)  CAPILLARY BLOOD GLUCOSE      POCT Blood Glucose.: 163 mg/dL (24 Dec 2023 12:11)  POCT Blood Glucose.: 190 mg/dL (24 Dec 2023 09:08)  POCT Blood Glucose.: 227 mg/dL (23 Dec 2023 22:47)  POCT Blood Glucose.: 221 mg/dL (23 Dec 2023 20:36)      Height (cm): 175.3 (12-24 @ 01:02)  Weight (kg): 79.379 (12-24 @ 01:02)  BMI (kg/m2): 25.8 (12-24 @ 01:02)  BSA (m2): 1.95 (12-24 @ 01:02)    Physical Examination:  GEN: uncomfortable, in some mild distress  NEURO: AAOx2, CN II-XII grossly intact, no focal deficits  PULM: symmetric chest rise bilaterally, no increased WOB  ABD: soft, distended, mild to moderate tenderness to palpation, rebound tenderness in 4 quadrants-worse in RUQ  EXTR: no lower extremity edema, moving all extremities  --------------------------------------------------------------------------------------------    LABS  CBC (12-24 @ 14:15)                              11.8<L>                         17.36<H>  )----------------(  189        --    % Neutrophils, --    % Lymphocytes, ANC: --                                  33.9<L>  CBC (12-24 @ 06:53)                              11.4<L>                         16.61<H>  )----------------(  174        83.5<H>% Neutrophils, 7.9<L>% Lymphocytes, ANC: 13.87<H>                              32.9<L>    BMP (12-24 @ 14:15)             135     |  100     |  39<H> 		Ca++ --      Ca 8.4                ---------------------------------( 216<H>		Mg 2.00               3.9     |  21<L>   |  1.83<H>			Ph 2.6     BMP (12-24 @ 06:53)             135     |  102     |  34<H> 		Ca++ --      Ca 8.1<L>             ---------------------------------( 188<H>		Mg 1.90               4.0     |  20<L>   |  1.72<H>			Ph 2.7       LFTs (12-24 @ 14:15)      TPro 7.1 / Alb 3.4 / TBili 1.1 / DBili -- / AST 39 / ALT 32 / AlkPhos 57  LFTs (12-24 @ 06:53)      TPro 6.6 / Alb 3.0<L> / TBili 1.0 / DBili -- / AST 36 / ALT 25 / AlkPhos 46    Coags (12-23 @ 15:49)  aPTT 31.5 / INR 1.31<H> / PT 14.7<H>    Cardiac Markers (12-24 @ 00:35)     HSTrop: -- / CKMB: -- / CK: 348    ABG (12-24 @ 14:15)      /  /  /  /  / %     Lactate:  1.6    VBG (12-23 @ 15:49)     7.34 / 44 / 28 / 24 / -2.2<L> / 20.5<L>%     Lactate: 2.3<H>    --------------------------------------------------------------------------------------------    MICROBIOLOGY  Urinalysis (12-24 @ 14:15):     Color:  / Appearance:  / SG:  / pH:  / Gluc: 216<H> / Ketones:  / Bili:  / Urobili:  / Protein : / Nitrites:  / Leuk.Est:  / RBC:  / WBC:  / Sq Epi:  / Non Sq Epi:  / Bacteria          --------------------------------------------------------------------------------------------    IMAGING  CT scan performed

## 2023-12-24 NOTE — CONSULT NOTE ADULT - SUBJECTIVE AND OBJECTIVE BOX
NEPHROLOGY - NSN    Patient seen and examined.    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 Patient states that he has been having a dry cough for the past week, worsened over the past few days. Denies SOB with the cough, denies hemoptysis. Reports fevers at home to 101.5 with associated diaphoresis. Said that he has significant pinprick like b/l chest pain with his cough but denies any chest pain when not coughing or with ambulation. Also reports rhinorrhea and sore throat. Reports generalized weakness and poor appetite. States his daughter was visiting him over the week end last week and she was positive for COVID19. Patient tested positive for COVID19 2 days prior and was started on paxlovid as outpatient. Of note, family states that the patient has had worsening confusion at home over the past 6 months (becoming disoriented to time) but recently has been more confused, talking about seeing people that are not present.     On arrival to the ED his vitals were T 98 -> 99.2, P 80, /72, RR 18, ) sat 100% RA. His lab work showed leukocytosis with neutrophilia and bandemia, MABLE, mild hyponatremia, indeterminant but stable troponins, and elevated lactate to 2.3. His COVID19 swab was positive. His CXR showed bibasilar crackles.  (23 Dec 2023 22:51)      PAST MEDICAL & SURGICAL HISTORY:  Hyperlipemia      Hypertension      Coronary Artery Disease      Diabetes Mellitus Type II      Stented Coronary Artery  total 5 stents, last stent 5/2019      Neuropathy      Myocardial infarction      Stroke  mild left facial numbness   no other residuals verbalized      Myoclonic jerking      Stage 3 chronic kidney disease      History of Cataract Extraction      Hx of CABG      H/O coronary angiogram      S/P coronary artery stent placement  1/6/09      S/P placement of cardiac pacemaker          MEDICATIONS  (STANDING):  aspirin enteric coated 81 milliGRAM(s) Oral daily  atorvastatin 80 milliGRAM(s) Oral at bedtime  dextrose 5%. 1000 milliLiter(s) (100 mL/Hr) IV Continuous <Continuous>  dextrose 5%. 1000 milliLiter(s) (50 mL/Hr) IV Continuous <Continuous>  dextrose 50% Injectable 25 Gram(s) IV Push once  dextrose 50% Injectable 25 Gram(s) IV Push once  dextrose 50% Injectable 12.5 Gram(s) IV Push once  enoxaparin Injectable 30 milliGRAM(s) SubCutaneous every 24 hours  escitalopram 10 milliGRAM(s) Oral daily  gabapentin 100 milliGRAM(s) Oral two times a day  glucagon  Injectable 1 milliGRAM(s) IntraMuscular once  insulin glargine Injectable (LANTUS) 18 Unit(s) SubCutaneous at bedtime  insulin lispro (ADMELOG) corrective regimen sliding scale   SubCutaneous three times a day before meals  insulin lispro (ADMELOG) corrective regimen sliding scale   SubCutaneous at bedtime  insulin lispro Injectable (ADMELOG) 2 Unit(s) SubCutaneous before dinner  lactated ringers. 1000 milliLiter(s) (100 mL/Hr) IV Continuous <Continuous>  levETIRAcetam 250 milliGRAM(s) Oral two times a day  memantine 5 milliGRAM(s) Oral two times a day  metoprolol succinate ER 25 milliGRAM(s) Oral every 12 hours  ranolazine 500 milliGRAM(s) Oral two times a day  ticagrelor 60 milliGRAM(s) Oral every 12 hours      Allergies    fluoroquinolone antibiotics (Other)  Cipro (Unknown)  Tegretol (Unknown)  carbamazepine (Other)    Intolerances        SOCIAL HISTORY:  Denies alcohol abuse, drug abuse or tobacco usage.     FAMILY HISTORY:  No pertinent family history in first degree relatives        VITALS:  T(C): 36.6 (12-24-23 @ 06:40), Max: 37.3 (12-23-23 @ 20:31)  HR: 88 (12-24-23 @ 06:40) (80 - 88)  BP: 137/55 (12-24-23 @ 06:40) (137/55 - 171/72)  RR: 16 (12-24-23 @ 06:40) (16 - 22)  SpO2: 97% (12-24-23 @ 06:40) (97% - 100%)    REVIEW OF SYSTEMS:  Denies any nausea, vomiting, diarrhea, fever or chills. Denies chest pain, SOB, focal weakness, hematuria or dysuria. Good oral intake and denies fatigue or weakness. All other pertinent systems are reviewed and are negative.    PHYSICAL EXAM:  Constitutional: NAD  HEENT: EOMI  Neck:  No JVD, supple   Respiratory: CTA B/L  Cardiovascular: S1 and S2, RRR  Gastrointestinal: + BS, soft, NT, ND  Extremities: No peripheral edema, + peripheral pulses  Neurological: A/O x 3, CN2-12 intact  Psychiatric: Normal mood, normal affect  : No Moss  Skin: No rashes, C/D/I  Access: Not applicable    I and O's:    Height (cm): 175.3 (12-24 @ 01:02)  Weight (kg): 79.379 (12-24 @ 01:02)  BMI (kg/m2): 25.8 (12-24 @ 01:02)  BSA (m2): 1.95 (12-24 @ 01:02)    LABS:                        11.4   16.61 )-----------( 174      ( 24 Dec 2023 06:53 )             32.9     12-24    135  |  102  |  34<H>  ----------------------------<  188<H>  4.0   |  20<L>  |  1.72<H>    Ca    8.1<L>      24 Dec 2023 06:53  Phos  2.7     12-24  Mg     1.90     12-24    TPro  6.6  /  Alb  3.0<L>  /  TBili  1.0  /  DBili  x   /  AST  36  /  ALT  25  /  AlkPhos  46  12-24      URINE:  Urinalysis Basic - ( 24 Dec 2023 06:53 )    Color: x / Appearance: x / SG: x / pH: x  Gluc: 188 mg/dL / Ketone: x  / Bili: x / Urobili: x   Blood: x / Protein: x / Nitrite: x   Leuk Esterase: x / RBC: x / WBC x   Sq Epi: x / Non Sq Epi: x / Bacteria: x      Creatinine, Random Urine: 145 mg/dL (12-24 @ 07:18)  Sodium, Random Urine: 20 mmol/L (12-24 @ 07:18)    RADIOLOGY & ADDITIONAL STUDIES:     NEPHROLOGY - NSN    Patient seen and examined.    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 Patient states that he has been having a dry cough for the past week, worsened over the past few days. Denies SOB with the cough, denies hemoptysis. Reports fevers at home to 101.5 with associated diaphoresis. Said that he has significant pinprick like b/l chest pain with his cough but denies any chest pain when not coughing or with ambulation. Also reports rhinorrhea and sore throat. Reports generalized weakness and poor appetite. States his daughter was visiting him over the week end last week and she was positive for COVID19. Patient tested positive for COVID19 2 days prior and was started on paxlovid as outpatient. Of note, family states that the patient has had worsening confusion at home over the past 6 months (becoming disoriented to time) but recently has been more confused, talking about seeing people that are not present.     On arrival to the ED his vitals were T 98 -> 99.2, P 80, /72, RR 18, ) sat 100% RA. His lab work showed leukocytosis with neutrophilia and bandemia, MABLE, mild hyponatremia, indeterminant but stable troponins, and elevated lactate to 2.3. His COVID19 swab was positive. His CXR showed bibasilar crackles.    He has a distended abd at present and abd xray also showed a degree of distention  ROS was unreliable   Hx from the son        PAST MEDICAL & SURGICAL HISTORY:  Hyperlipemia      Hypertension      Coronary Artery Disease      Diabetes Mellitus Type II      Stented Coronary Artery  total 5 stents, last stent 5/2019      Neuropathy      Myocardial infarction      Stroke  mild left facial numbness   no other residuals verbalized      Myoclonic jerking      Stage 3 chronic kidney disease      History of Cataract Extraction      Hx of CABG      H/O coronary angiogram      S/P coronary artery stent placement  1/6/09      S/P placement of cardiac pacemaker          MEDICATIONS  (STANDING):  aspirin enteric coated 81 milliGRAM(s) Oral daily  atorvastatin 80 milliGRAM(s) Oral at bedtime  dextrose 5%. 1000 milliLiter(s) (100 mL/Hr) IV Continuous <Continuous>  dextrose 5%. 1000 milliLiter(s) (50 mL/Hr) IV Continuous <Continuous>  dextrose 50% Injectable 25 Gram(s) IV Push once  dextrose 50% Injectable 25 Gram(s) IV Push once  dextrose 50% Injectable 12.5 Gram(s) IV Push once  enoxaparin Injectable 30 milliGRAM(s) SubCutaneous every 24 hours  escitalopram 10 milliGRAM(s) Oral daily  gabapentin 100 milliGRAM(s) Oral two times a day  glucagon  Injectable 1 milliGRAM(s) IntraMuscular once  insulin glargine Injectable (LANTUS) 18 Unit(s) SubCutaneous at bedtime  insulin lispro (ADMELOG) corrective regimen sliding scale   SubCutaneous three times a day before meals  insulin lispro (ADMELOG) corrective regimen sliding scale   SubCutaneous at bedtime  insulin lispro Injectable (ADMELOG) 2 Unit(s) SubCutaneous before dinner  lactated ringers. 1000 milliLiter(s) (100 mL/Hr) IV Continuous <Continuous>  levETIRAcetam 250 milliGRAM(s) Oral two times a day  memantine 5 milliGRAM(s) Oral two times a day  metoprolol succinate ER 25 milliGRAM(s) Oral every 12 hours  ranolazine 500 milliGRAM(s) Oral two times a day  ticagrelor 60 milliGRAM(s) Oral every 12 hours      Allergies    fluoroquinolone antibiotics (Other)  Cipro (Unknown)  Tegretol (Unknown)  carbamazepine (Other)    Intolerances        SOCIAL HISTORY:  Denies alcohol abuse, drug abuse or tobacco usage.     FAMILY HISTORY:  No pertinent family history in first degree relatives        VITALS:  T(C): 36.6 (12-24-23 @ 06:40), Max: 37.3 (12-23-23 @ 20:31)  HR: 88 (12-24-23 @ 06:40) (80 - 88)  BP: 137/55 (12-24-23 @ 06:40) (137/55 - 171/72)  RR: 16 (12-24-23 @ 06:40) (16 - 22)  SpO2: 97% (12-24-23 @ 06:40) (97% - 100%)    REVIEW OF SYSTEMS:unreliable     PHYSICAL EXAM:  Constitutional: NAD  HEENT: EOMI  Neck:  No JVD, supple   Respiratory: CTA B/L  Cardiovascular: S1 and S2, RRR  Gastrointestinal: + BS, distended and tympanic   Extremities: No peripheral edema, + peripheral pulses  Neurological:   CN2-12 intact  Psychiatric: Normal mood, normal affect  : + Moss  Skin: No rashes, C/D/I  Access: Not applicable    I and O's:    Height (cm): 175.3 (12-24 @ 01:02)  Weight (kg): 79.379 (12-24 @ 01:02)  BMI (kg/m2): 25.8 (12-24 @ 01:02)  BSA (m2): 1.95 (12-24 @ 01:02)    LABS:                        11.4   16.61 )-----------( 174      ( 24 Dec 2023 06:53 )             32.9     12-24    135  |  102  |  34<H>  ----------------------------<  188<H>  4.0   |  20<L>  |  1.72<H>    Ca    8.1<L>      24 Dec 2023 06:53  Phos  2.7     12-24  Mg     1.90     12-24    TPro  6.6  /  Alb  3.0<L>  /  TBili  1.0  /  DBili  x   /  AST  36  /  ALT  25  /  AlkPhos  46  12-24      URINE:  Urinalysis Basic - ( 24 Dec 2023 06:53 )    Color: x / Appearance: x / SG: x / pH: x  Gluc: 188 mg/dL / Ketone: x  / Bili: x / Urobili: x   Blood: x / Protein: x / Nitrite: x   Leuk Esterase: x / RBC: x / WBC x   Sq Epi: x / Non Sq Epi: x / Bacteria: x      Creatinine, Random Urine: 145 mg/dL (12-24 @ 07:18)  Sodium, Random Urine: 20 mmol/L (12-24 @ 07:18)    RADIOLOGY & ADDITIONAL STUDIES:    < from: Xray Chest 1 View- PORTABLE-Urgent (12.23.23 @ 16:46) >    ACC: [id number] EXAM:  XR CHEST PORTABLE URGENT 1V   ORDERED BY: GEORGE JOHNSON     PROCEDURE DATE:  12/23/2023          INTERPRETATION:  EXAMINATION: XR CHEST URGENT    CLINICAL INDICATION: sob    TECHNIQUE: Single frontal, portable view of the chest was obtained.    COMPARISON: Chest x-ray 6/6/2022.    FINDINGS:  Left chest wall cardiac device leads terminating in the right atrium and   right ventricle, unchanged. Sternotomy. Thoracic aortic calcifications.   Bibasilar atelectasis.  The heart is normal in size. Normal pulmonary vasculature  There is no pneumothorax or pleural effusion.  No acute bony abnormality.    IMPRESSION:  Bibasilar atelectasis..    --- End of Report ---          ALEJANDRA GARCIA DO; Resident Radiologist  This document has been electronically signed.  SOLO HERNANDEZ DO; Attending Radiologist  This document has b    < end of copied text >   NEPHROLOGY - NSN    Patient seen and examined.    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 Patient states that he has been having a dry cough for the past week, worsened over the past few days. Denies SOB with the cough, denies hemoptysis. Reports fevers at home to 101.5 with associated diaphoresis. Said that he has significant pinprick like b/l chest pain with his cough but denies any chest pain when not coughing or with ambulation. Also reports rhinorrhea and sore throat. Reports generalized weakness and poor appetite. States his daughter was visiting him over the week end last week and she was positive for COVID19. Patient tested positive for COVID19 2 days prior and was started on paxlovid as outpatient. Of note, family states that the patient has had worsening confusion at home over the past 6 months (becoming disoriented to time) but recently has been more confused, talking about seeing people that are not present.     On arrival to the ED his vitals were T 98 -> 99.2, P 80, /72, RR 18, ) sat 100% RA. His lab work showed leukocytosis with neutrophilia and bandemia, MABLE, mild hyponatremia, indeterminant but stable troponins, and elevated lactate to 2.3. His COVID19 swab was positive. His CXR showed bibasilar crackles.    He has a distended abd at present and abd xray also showed a degree of distention  ROS was unreliable   Hx from the son        PAST MEDICAL & SURGICAL HISTORY:  Hyperlipemia      Hypertension      Coronary Artery Disease      Diabetes Mellitus Type II      Stented Coronary Artery  total 5 stents, last stent 5/2019      Neuropathy      Myocardial infarction      Stroke  mild left facial numbness   no other residuals verbalized      Myoclonic jerking      Stage 3 chronic kidney disease      History of Cataract Extraction      Hx of CABG      H/O coronary angiogram      S/P coronary artery stent placement  1/6/09      S/P placement of cardiac pacemaker          MEDICATIONS  (STANDING):  aspirin enteric coated 81 milliGRAM(s) Oral daily  atorvastatin 80 milliGRAM(s) Oral at bedtime  dextrose 5%. 1000 milliLiter(s) (100 mL/Hr) IV Continuous <Continuous>  dextrose 5%. 1000 milliLiter(s) (50 mL/Hr) IV Continuous <Continuous>  dextrose 50% Injectable 25 Gram(s) IV Push once  dextrose 50% Injectable 25 Gram(s) IV Push once  dextrose 50% Injectable 12.5 Gram(s) IV Push once  enoxaparin Injectable 30 milliGRAM(s) SubCutaneous every 24 hours  escitalopram 10 milliGRAM(s) Oral daily  gabapentin 100 milliGRAM(s) Oral two times a day  glucagon  Injectable 1 milliGRAM(s) IntraMuscular once  insulin glargine Injectable (LANTUS) 18 Unit(s) SubCutaneous at bedtime  insulin lispro (ADMELOG) corrective regimen sliding scale   SubCutaneous three times a day before meals  insulin lispro (ADMELOG) corrective regimen sliding scale   SubCutaneous at bedtime  insulin lispro Injectable (ADMELOG) 2 Unit(s) SubCutaneous before dinner  lactated ringers. 1000 milliLiter(s) (100 mL/Hr) IV Continuous <Continuous>  levETIRAcetam 250 milliGRAM(s) Oral two times a day  memantine 5 milliGRAM(s) Oral two times a day  metoprolol succinate ER 25 milliGRAM(s) Oral every 12 hours  ranolazine 500 milliGRAM(s) Oral two times a day  ticagrelor 60 milliGRAM(s) Oral every 12 hours      Allergies    fluoroquinolone antibiotics (Other)  Cipro (Unknown)  Tegretol (Unknown)  carbamazepine (Other)    Intolerances        SOCIAL HISTORY:  Denies alcohol abuse, drug abuse or tobacco usage.     FAMILY HISTORY:  No pertinent family history in first degree relatives        VITALS:  T(C): 36.6 (12-24-23 @ 06:40), Max: 37.3 (12-23-23 @ 20:31)  HR: 88 (12-24-23 @ 06:40) (80 - 88)  BP: 137/55 (12-24-23 @ 06:40) (137/55 - 171/72)  RR: 16 (12-24-23 @ 06:40) (16 - 22)  SpO2: 97% (12-24-23 @ 06:40) (97% - 100%)    REVIEW OF SYSTEMS:unreliable     PHYSICAL EXAM:  Constitutional: NAD  HEENT: EOMI  Neck:  No JVD, supple   Respiratory: CTA B/L  Cardiovascular: S1 and S2, RRR  Gastrointestinal: + BS, distended and tympanic   Extremities: No peripheral edema, + peripheral pulses  Neurological:   CN2-12 intact  Psychiatric: Normal mood, normal affect  : + Moss  Skin: No rashes, C/D/I  Access: Not applicable    I and O's:    Height (cm): 175.3 (12-24 @ 01:02)  Weight (kg): 79.379 (12-24 @ 01:02)  BMI (kg/m2): 25.8 (12-24 @ 01:02)  BSA (m2): 1.95 (12-24 @ 01:02)    LABS:                        11.4   16.61 )-----------( 174      ( 24 Dec 2023 06:53 )             32.9     12-24    135  |  102  |  34<H>  ----------------------------<  188<H>  4.0   |  20<L>  |  1.72<H>    Ca    8.1<L>      24 Dec 2023 06:53  Phos  2.7     12-24  Mg     1.90     12-24    TPro  6.6  /  Alb  3.0<L>  /  TBili  1.0  /  DBili  x   /  AST  36  /  ALT  25  /  AlkPhos  46  12-24      URINE:  Urinalysis Basic - ( 24 Dec 2023 06:53 )    Color: x / Appearance: x / SG: x / pH: x  Gluc: 188 mg/dL / Ketone: x  / Bili: x / Urobili: x   Blood: x / Protein: x / Nitrite: x   Leuk Esterase: x / RBC: x / WBC x   Sq Epi: x / Non Sq Epi: x / Bacteria: x      Creatinine, Random Urine: 145 mg/dL (12-24 @ 07:18)  Sodium, Random Urine: 20 mmol/L (12-24 @ 07:18)    RADIOLOGY & ADDITIONAL STUDIES:    < from: Xray Chest 1 View- PORTABLE-Urgent (12.23.23 @ 16:46) >    ACC: [id number] EXAM:  XR CHEST PORTABLE URGENT 1V   ORDERED BY: GEORGE JOHNSON     PROCEDURE DATE:  12/23/2023          INTERPRETATION:  EXAMINATION: XR CHEST URGENT    CLINICAL INDICATION: sob    TECHNIQUE: Single frontal, portable view of the chest was obtained.    COMPARISON: Chest x-ray 6/6/2022.    FINDINGS:  Left chest wall cardiac device leads terminating in the right atrium and   right ventricle, unchanged. Sternotomy. Thoracic aortic calcifications.   Bibasilar atelectasis.  The heart is normal in size. Normal pulmonary vasculature  There is no pneumothorax or pleural effusion.  No acute bony abnormality.    IMPRESSION:  Bibasilar atelectasis..    --- End of Report ---          ALEJANDRA GARCIA DO; Resident Radiologist  This document has been electronically signed.  SOLO HERNANDEZ DO; Attending Radiologist  This document has b    < end of copied text >

## 2023-12-25 LAB
ANION GAP SERPL CALC-SCNC: 13 MMOL/L — SIGNIFICANT CHANGE UP (ref 7–14)
ANION GAP SERPL CALC-SCNC: 13 MMOL/L — SIGNIFICANT CHANGE UP (ref 7–14)
APTT BLD: 32.3 SEC — SIGNIFICANT CHANGE UP (ref 24.5–35.6)
APTT BLD: 32.3 SEC — SIGNIFICANT CHANGE UP (ref 24.5–35.6)
BLOOD GAS ARTERIAL - LYTES,HGB,ICA,LACT RESULT: SIGNIFICANT CHANGE UP
BLOOD GAS ARTERIAL - LYTES,HGB,ICA,LACT RESULT: SIGNIFICANT CHANGE UP
BUN SERPL-MCNC: 43 MG/DL — HIGH (ref 7–23)
BUN SERPL-MCNC: 43 MG/DL — HIGH (ref 7–23)
CALCIUM SERPL-MCNC: 8.1 MG/DL — LOW (ref 8.4–10.5)
CALCIUM SERPL-MCNC: 8.1 MG/DL — LOW (ref 8.4–10.5)
CHLORIDE SERPL-SCNC: 101 MMOL/L — SIGNIFICANT CHANGE UP (ref 98–107)
CHLORIDE SERPL-SCNC: 101 MMOL/L — SIGNIFICANT CHANGE UP (ref 98–107)
CO2 SERPL-SCNC: 19 MMOL/L — LOW (ref 22–31)
CO2 SERPL-SCNC: 19 MMOL/L — LOW (ref 22–31)
CREAT SERPL-MCNC: 1.96 MG/DL — HIGH (ref 0.5–1.3)
CREAT SERPL-MCNC: 1.96 MG/DL — HIGH (ref 0.5–1.3)
EGFR: 32 ML/MIN/1.73M2 — LOW
EGFR: 32 ML/MIN/1.73M2 — LOW
GLUCOSE BLDC GLUCOMTR-MCNC: 181 MG/DL — HIGH (ref 70–99)
GLUCOSE BLDC GLUCOMTR-MCNC: 181 MG/DL — HIGH (ref 70–99)
GLUCOSE SERPL-MCNC: 190 MG/DL — HIGH (ref 70–99)
GLUCOSE SERPL-MCNC: 190 MG/DL — HIGH (ref 70–99)
HCT VFR BLD CALC: 25.1 % — LOW (ref 39–50)
HCT VFR BLD CALC: 25.1 % — LOW (ref 39–50)
HCT VFR BLD CALC: 26.2 % — LOW (ref 39–50)
HCT VFR BLD CALC: 26.2 % — LOW (ref 39–50)
HCT VFR BLD CALC: 29.7 % — LOW (ref 39–50)
HCT VFR BLD CALC: 29.7 % — LOW (ref 39–50)
HGB BLD-MCNC: 8.8 G/DL — LOW (ref 13–17)
HGB BLD-MCNC: 8.8 G/DL — LOW (ref 13–17)
HGB BLD-MCNC: 8.9 G/DL — LOW (ref 13–17)
HGB BLD-MCNC: 8.9 G/DL — LOW (ref 13–17)
HGB BLD-MCNC: 9.9 G/DL — LOW (ref 13–17)
HGB BLD-MCNC: 9.9 G/DL — LOW (ref 13–17)
INR BLD: 1.15 RATIO — SIGNIFICANT CHANGE UP (ref 0.85–1.18)
INR BLD: 1.15 RATIO — SIGNIFICANT CHANGE UP (ref 0.85–1.18)
MAGNESIUM SERPL-MCNC: 2.1 MG/DL — SIGNIFICANT CHANGE UP (ref 1.6–2.6)
MAGNESIUM SERPL-MCNC: 2.1 MG/DL — SIGNIFICANT CHANGE UP (ref 1.6–2.6)
MCHC RBC-ENTMCNC: 30.1 PG — SIGNIFICANT CHANGE UP (ref 27–34)
MCHC RBC-ENTMCNC: 30.1 PG — SIGNIFICANT CHANGE UP (ref 27–34)
MCHC RBC-ENTMCNC: 30.2 PG — SIGNIFICANT CHANGE UP (ref 27–34)
MCHC RBC-ENTMCNC: 30.2 PG — SIGNIFICANT CHANGE UP (ref 27–34)
MCHC RBC-ENTMCNC: 30.8 PG — SIGNIFICANT CHANGE UP (ref 27–34)
MCHC RBC-ENTMCNC: 30.8 PG — SIGNIFICANT CHANGE UP (ref 27–34)
MCHC RBC-ENTMCNC: 33.3 GM/DL — SIGNIFICANT CHANGE UP (ref 32–36)
MCHC RBC-ENTMCNC: 33.3 GM/DL — SIGNIFICANT CHANGE UP (ref 32–36)
MCHC RBC-ENTMCNC: 34 GM/DL — SIGNIFICANT CHANGE UP (ref 32–36)
MCHC RBC-ENTMCNC: 34 GM/DL — SIGNIFICANT CHANGE UP (ref 32–36)
MCHC RBC-ENTMCNC: 35.1 GM/DL — SIGNIFICANT CHANGE UP (ref 32–36)
MCHC RBC-ENTMCNC: 35.1 GM/DL — SIGNIFICANT CHANGE UP (ref 32–36)
MCV RBC AUTO: 86 FL — SIGNIFICANT CHANGE UP (ref 80–100)
MCV RBC AUTO: 86 FL — SIGNIFICANT CHANGE UP (ref 80–100)
MCV RBC AUTO: 90.5 FL — SIGNIFICANT CHANGE UP (ref 80–100)
MCV RBC AUTO: 90.5 FL — SIGNIFICANT CHANGE UP (ref 80–100)
MCV RBC AUTO: 90.7 FL — SIGNIFICANT CHANGE UP (ref 80–100)
MCV RBC AUTO: 90.7 FL — SIGNIFICANT CHANGE UP (ref 80–100)
NRBC # BLD: 0 /100 WBCS — SIGNIFICANT CHANGE UP (ref 0–0)
NRBC # FLD: 0 K/UL — SIGNIFICANT CHANGE UP (ref 0–0)
PHOSPHATE SERPL-MCNC: 3.4 MG/DL — SIGNIFICANT CHANGE UP (ref 2.5–4.5)
PHOSPHATE SERPL-MCNC: 3.4 MG/DL — SIGNIFICANT CHANGE UP (ref 2.5–4.5)
PLATELET # BLD AUTO: 183 K/UL — SIGNIFICANT CHANGE UP (ref 150–400)
PLATELET # BLD AUTO: 183 K/UL — SIGNIFICANT CHANGE UP (ref 150–400)
PLATELET # BLD AUTO: 193 K/UL — SIGNIFICANT CHANGE UP (ref 150–400)
PLATELET # BLD AUTO: 193 K/UL — SIGNIFICANT CHANGE UP (ref 150–400)
PLATELET # BLD AUTO: 197 K/UL — SIGNIFICANT CHANGE UP (ref 150–400)
PLATELET # BLD AUTO: 197 K/UL — SIGNIFICANT CHANGE UP (ref 150–400)
POTASSIUM SERPL-MCNC: 3.8 MMOL/L — SIGNIFICANT CHANGE UP (ref 3.5–5.3)
POTASSIUM SERPL-MCNC: 3.8 MMOL/L — SIGNIFICANT CHANGE UP (ref 3.5–5.3)
POTASSIUM SERPL-SCNC: 3.8 MMOL/L — SIGNIFICANT CHANGE UP (ref 3.5–5.3)
POTASSIUM SERPL-SCNC: 3.8 MMOL/L — SIGNIFICANT CHANGE UP (ref 3.5–5.3)
PROTHROM AB SERPL-ACNC: 12.9 SEC — SIGNIFICANT CHANGE UP (ref 9.5–13)
PROTHROM AB SERPL-ACNC: 12.9 SEC — SIGNIFICANT CHANGE UP (ref 9.5–13)
RBC # BLD: 2.89 M/UL — LOW (ref 4.2–5.8)
RBC # BLD: 2.89 M/UL — LOW (ref 4.2–5.8)
RBC # BLD: 2.92 M/UL — LOW (ref 4.2–5.8)
RBC # BLD: 2.92 M/UL — LOW (ref 4.2–5.8)
RBC # BLD: 3.28 M/UL — LOW (ref 4.2–5.8)
RBC # BLD: 3.28 M/UL — LOW (ref 4.2–5.8)
RBC # FLD: 14.3 % — SIGNIFICANT CHANGE UP (ref 10.3–14.5)
RBC # FLD: 14.3 % — SIGNIFICANT CHANGE UP (ref 10.3–14.5)
RBC # FLD: 14.4 % — SIGNIFICANT CHANGE UP (ref 10.3–14.5)
RBC # FLD: 14.4 % — SIGNIFICANT CHANGE UP (ref 10.3–14.5)
RBC # FLD: 14.6 % — HIGH (ref 10.3–14.5)
RBC # FLD: 14.6 % — HIGH (ref 10.3–14.5)
SODIUM SERPL-SCNC: 133 MMOL/L — LOW (ref 135–145)
SODIUM SERPL-SCNC: 133 MMOL/L — LOW (ref 135–145)
WBC # BLD: 10.81 K/UL — HIGH (ref 3.8–10.5)
WBC # BLD: 10.81 K/UL — HIGH (ref 3.8–10.5)
WBC # BLD: 12.26 K/UL — HIGH (ref 3.8–10.5)
WBC # BLD: 12.26 K/UL — HIGH (ref 3.8–10.5)
WBC # BLD: 14.58 K/UL — HIGH (ref 3.8–10.5)
WBC # BLD: 14.58 K/UL — HIGH (ref 3.8–10.5)
WBC # FLD AUTO: 10.81 K/UL — HIGH (ref 3.8–10.5)
WBC # FLD AUTO: 10.81 K/UL — HIGH (ref 3.8–10.5)
WBC # FLD AUTO: 12.26 K/UL — HIGH (ref 3.8–10.5)
WBC # FLD AUTO: 12.26 K/UL — HIGH (ref 3.8–10.5)
WBC # FLD AUTO: 14.58 K/UL — HIGH (ref 3.8–10.5)
WBC # FLD AUTO: 14.58 K/UL — HIGH (ref 3.8–10.5)

## 2023-12-25 PROCEDURE — 37236 OPEN/PERQ PLACE STENT 1ST: CPT

## 2023-12-25 PROCEDURE — 36245 INS CATH ABD/L-EXT ART 1ST: CPT

## 2023-12-25 PROCEDURE — 99232 SBSQ HOSP IP/OBS MODERATE 35: CPT | Mod: 24,GC

## 2023-12-25 PROCEDURE — 99291 CRITICAL CARE FIRST HOUR: CPT | Mod: 25

## 2023-12-25 PROCEDURE — 75726 ARTERY X-RAYS ABDOMEN: CPT | Mod: 26,59

## 2023-12-25 PROCEDURE — 71045 X-RAY EXAM CHEST 1 VIEW: CPT | Mod: 26

## 2023-12-25 PROCEDURE — 93306 TTE W/DOPPLER COMPLETE: CPT | Mod: 26

## 2023-12-25 RX ORDER — HEPARIN SODIUM 5000 [USP'U]/ML
5000 INJECTION INTRAVENOUS; SUBCUTANEOUS EVERY 8 HOURS
Refills: 0 | Status: DISCONTINUED | OUTPATIENT
Start: 2023-12-25 | End: 2024-01-01

## 2023-12-25 RX ORDER — DEXTROSE 50 % IN WATER 50 %
15 SYRINGE (ML) INTRAVENOUS ONCE
Refills: 0 | Status: DISCONTINUED | OUTPATIENT
Start: 2023-12-25 | End: 2023-12-25

## 2023-12-25 RX ORDER — INSULIN GLARGINE 100 [IU]/ML
20 INJECTION, SOLUTION SUBCUTANEOUS AT BEDTIME
Refills: 0 | Status: DISCONTINUED | OUTPATIENT
Start: 2023-12-25 | End: 2023-12-30

## 2023-12-25 RX ORDER — SODIUM CHLORIDE 9 MG/ML
1000 INJECTION, SOLUTION INTRAVENOUS
Refills: 0 | Status: DISCONTINUED | OUTPATIENT
Start: 2023-12-25 | End: 2023-12-25

## 2023-12-25 RX ORDER — DEXTROSE 50 % IN WATER 50 %
25 SYRINGE (ML) INTRAVENOUS ONCE
Refills: 0 | Status: DISCONTINUED | OUTPATIENT
Start: 2023-12-25 | End: 2023-12-25

## 2023-12-25 RX ORDER — CHLORHEXIDINE GLUCONATE 213 G/1000ML
1 SOLUTION TOPICAL DAILY
Refills: 0 | Status: DISCONTINUED | OUTPATIENT
Start: 2023-12-25 | End: 2024-01-01

## 2023-12-25 RX ORDER — SODIUM CHLORIDE 9 MG/ML
1000 INJECTION, SOLUTION INTRAVENOUS
Refills: 0 | Status: DISCONTINUED | OUTPATIENT
Start: 2023-12-25 | End: 2023-12-27

## 2023-12-25 RX ORDER — REMDESIVIR 5 MG/ML
100 INJECTION INTRAVENOUS EVERY 24 HOURS
Refills: 0 | Status: DISCONTINUED | OUTPATIENT
Start: 2023-12-26 | End: 2023-12-26

## 2023-12-25 RX ORDER — LABETALOL HCL 100 MG
10 TABLET ORAL ONCE
Refills: 0 | Status: COMPLETED | OUTPATIENT
Start: 2023-12-25 | End: 2023-12-25

## 2023-12-25 RX ORDER — ACETAMINOPHEN 500 MG
1000 TABLET ORAL EVERY 6 HOURS
Refills: 0 | Status: COMPLETED | OUTPATIENT
Start: 2023-12-25 | End: 2023-12-25

## 2023-12-25 RX ORDER — POTASSIUM CHLORIDE 20 MEQ
10 PACKET (EA) ORAL
Refills: 0 | Status: COMPLETED | OUTPATIENT
Start: 2023-12-25 | End: 2023-12-25

## 2023-12-25 RX ORDER — INSULIN GLARGINE 100 [IU]/ML
18 INJECTION, SOLUTION SUBCUTANEOUS AT BEDTIME
Refills: 0 | Status: DISCONTINUED | OUTPATIENT
Start: 2023-12-25 | End: 2023-12-25

## 2023-12-25 RX ORDER — GLUCAGON INJECTION, SOLUTION 0.5 MG/.1ML
1 INJECTION, SOLUTION SUBCUTANEOUS ONCE
Refills: 0 | Status: DISCONTINUED | OUTPATIENT
Start: 2023-12-25 | End: 2023-12-27

## 2023-12-25 RX ORDER — HYDROMORPHONE HYDROCHLORIDE 2 MG/ML
0.5 INJECTION INTRAMUSCULAR; INTRAVENOUS; SUBCUTANEOUS ONCE
Refills: 0 | Status: DISCONTINUED | OUTPATIENT
Start: 2023-12-25 | End: 2023-12-25

## 2023-12-25 RX ORDER — DEXTROSE 50 % IN WATER 50 %
25 SYRINGE (ML) INTRAVENOUS ONCE
Refills: 0 | Status: DISCONTINUED | OUTPATIENT
Start: 2023-12-25 | End: 2024-01-05

## 2023-12-25 RX ORDER — HYDROMORPHONE HYDROCHLORIDE 2 MG/ML
0.2 INJECTION INTRAMUSCULAR; INTRAVENOUS; SUBCUTANEOUS ONCE
Refills: 0 | Status: DISCONTINUED | OUTPATIENT
Start: 2023-12-25 | End: 2023-12-25

## 2023-12-25 RX ORDER — GLUCAGON INJECTION, SOLUTION 0.5 MG/.1ML
1 INJECTION, SOLUTION SUBCUTANEOUS ONCE
Refills: 0 | Status: DISCONTINUED | OUTPATIENT
Start: 2023-12-25 | End: 2023-12-25

## 2023-12-25 RX ORDER — SODIUM CHLORIDE 9 MG/ML
1000 INJECTION, SOLUTION INTRAVENOUS
Refills: 0 | Status: DISCONTINUED | OUTPATIENT
Start: 2023-12-25 | End: 2023-12-26

## 2023-12-25 RX ORDER — DEXTROSE 50 % IN WATER 50 %
25 SYRINGE (ML) INTRAVENOUS ONCE
Refills: 0 | Status: DISCONTINUED | OUTPATIENT
Start: 2023-12-25 | End: 2023-12-27

## 2023-12-25 RX ORDER — DEXTROSE 50 % IN WATER 50 %
12.5 SYRINGE (ML) INTRAVENOUS ONCE
Refills: 0 | Status: DISCONTINUED | OUTPATIENT
Start: 2023-12-25 | End: 2023-12-25

## 2023-12-25 RX ORDER — INSULIN LISPRO 100/ML
VIAL (ML) SUBCUTANEOUS EVERY 6 HOURS
Refills: 0 | Status: DISCONTINUED | OUTPATIENT
Start: 2023-12-25 | End: 2023-12-25

## 2023-12-25 RX ORDER — LEVETIRACETAM 250 MG/1
250 TABLET, FILM COATED ORAL EVERY 12 HOURS
Refills: 0 | Status: DISCONTINUED | OUTPATIENT
Start: 2023-12-25 | End: 2023-12-27

## 2023-12-25 RX ORDER — ALBUMIN HUMAN 25 %
250 VIAL (ML) INTRAVENOUS ONCE
Refills: 0 | Status: COMPLETED | OUTPATIENT
Start: 2023-12-25 | End: 2023-12-25

## 2023-12-25 RX ORDER — ASPIRIN/CALCIUM CARB/MAGNESIUM 324 MG
300 TABLET ORAL DAILY
Refills: 0 | Status: DISCONTINUED | OUTPATIENT
Start: 2023-12-25 | End: 2023-12-27

## 2023-12-25 RX ORDER — DEXTROSE 50 % IN WATER 50 %
15 SYRINGE (ML) INTRAVENOUS ONCE
Refills: 0 | Status: DISCONTINUED | OUTPATIENT
Start: 2023-12-25 | End: 2023-12-27

## 2023-12-25 RX ORDER — DEXTROSE 50 % IN WATER 50 %
12.5 SYRINGE (ML) INTRAVENOUS ONCE
Refills: 0 | Status: DISCONTINUED | OUTPATIENT
Start: 2023-12-25 | End: 2024-01-05

## 2023-12-25 RX ORDER — INSULIN LISPRO 100/ML
VIAL (ML) SUBCUTANEOUS EVERY 6 HOURS
Refills: 0 | Status: DISCONTINUED | OUTPATIENT
Start: 2023-12-25 | End: 2023-12-27

## 2023-12-25 RX ORDER — INSULIN GLARGINE 100 [IU]/ML
18 INJECTION, SOLUTION SUBCUTANEOUS ONCE
Refills: 0 | Status: COMPLETED | OUTPATIENT
Start: 2023-12-25 | End: 2023-12-25

## 2023-12-25 RX ADMIN — Medication 300 MILLIGRAM(S): at 12:03

## 2023-12-25 RX ADMIN — PIPERACILLIN AND TAZOBACTAM 25 GRAM(S): 4; .5 INJECTION, POWDER, LYOPHILIZED, FOR SOLUTION INTRAVENOUS at 06:59

## 2023-12-25 RX ADMIN — Medication 125 MILLILITER(S): at 06:04

## 2023-12-25 RX ADMIN — Medication 1000 MILLIGRAM(S): at 17:58

## 2023-12-25 RX ADMIN — INSULIN GLARGINE 18 UNIT(S): 100 INJECTION, SOLUTION SUBCUTANEOUS at 03:51

## 2023-12-25 RX ADMIN — Medication 100 MILLIEQUIVALENT(S): at 05:04

## 2023-12-25 RX ADMIN — HYDROMORPHONE HYDROCHLORIDE 0.5 MILLIGRAM(S): 2 INJECTION INTRAMUSCULAR; INTRAVENOUS; SUBCUTANEOUS at 05:29

## 2023-12-25 RX ADMIN — Medication 100 MILLIEQUIVALENT(S): at 09:14

## 2023-12-25 RX ADMIN — Medication 1000 MILLIGRAM(S): at 15:18

## 2023-12-25 RX ADMIN — Medication 2: at 12:16

## 2023-12-25 RX ADMIN — Medication 400 MILLIGRAM(S): at 12:03

## 2023-12-25 RX ADMIN — HEPARIN SODIUM 5000 UNIT(S): 5000 INJECTION INTRAVENOUS; SUBCUTANEOUS at 05:04

## 2023-12-25 RX ADMIN — PIPERACILLIN AND TAZOBACTAM 25 GRAM(S): 4; .5 INJECTION, POWDER, LYOPHILIZED, FOR SOLUTION INTRAVENOUS at 17:22

## 2023-12-25 RX ADMIN — SODIUM CHLORIDE 100 MILLILITER(S): 9 INJECTION, SOLUTION INTRAVENOUS at 20:08

## 2023-12-25 RX ADMIN — Medication 400 MILLIGRAM(S): at 05:03

## 2023-12-25 RX ADMIN — HEPARIN SODIUM 5000 UNIT(S): 5000 INJECTION INTRAVENOUS; SUBCUTANEOUS at 12:04

## 2023-12-25 RX ADMIN — HYDROMORPHONE HYDROCHLORIDE 0.5 MILLIGRAM(S): 2 INJECTION INTRAMUSCULAR; INTRAVENOUS; SUBCUTANEOUS at 06:00

## 2023-12-25 RX ADMIN — Medication 1000 MILLIGRAM(S): at 06:00

## 2023-12-25 RX ADMIN — Medication 2: at 05:29

## 2023-12-25 RX ADMIN — Medication 100 MILLIEQUIVALENT(S): at 05:35

## 2023-12-25 RX ADMIN — HYDROMORPHONE HYDROCHLORIDE 0.2 MILLIGRAM(S): 2 INJECTION INTRAMUSCULAR; INTRAVENOUS; SUBCUTANEOUS at 03:43

## 2023-12-25 RX ADMIN — HEPARIN SODIUM 5000 UNIT(S): 5000 INJECTION INTRAVENOUS; SUBCUTANEOUS at 21:16

## 2023-12-25 RX ADMIN — Medication 10 MILLIGRAM(S): at 22:50

## 2023-12-25 RX ADMIN — REMDESIVIR 200 MILLIGRAM(S): 5 INJECTION INTRAVENOUS at 03:43

## 2023-12-25 RX ADMIN — HYDROMORPHONE HYDROCHLORIDE 0.2 MILLIGRAM(S): 2 INJECTION INTRAMUSCULAR; INTRAVENOUS; SUBCUTANEOUS at 04:00

## 2023-12-25 RX ADMIN — LEVETIRACETAM 250 MILLIGRAM(S): 250 TABLET, FILM COATED ORAL at 05:04

## 2023-12-25 RX ADMIN — Medication 400 MILLIGRAM(S): at 17:23

## 2023-12-25 RX ADMIN — INSULIN GLARGINE 20 UNIT(S): 100 INJECTION, SOLUTION SUBCUTANEOUS at 23:06

## 2023-12-25 RX ADMIN — LEVETIRACETAM 250 MILLIGRAM(S): 250 TABLET, FILM COATED ORAL at 17:23

## 2023-12-25 RX ADMIN — Medication 400 MILLIGRAM(S): at 23:06

## 2023-12-25 RX ADMIN — CHLORHEXIDINE GLUCONATE 1 APPLICATION(S): 213 SOLUTION TOPICAL at 12:07

## 2023-12-25 NOTE — PROGRESS NOTE ADULT - SUBJECTIVE AND OBJECTIVE BOX
Date of Service  : 12-25-23   INTERVAL HPI/OVERNIGHT EVENTS: Seen and examined earlier. no new concerns per staff.   Vital Signs Last 24 Hrs  T(C): 37.5 (25 Dec 2023 12:00), Max: 37.5 (25 Dec 2023 12:00)  T(F): 99.5 (25 Dec 2023 12:00), Max: 99.5 (25 Dec 2023 12:00)  HR: 84 (25 Dec 2023 12:00) (79 - 91)  BP: 128/67 (24 Dec 2023 18:30) (115/60 - 128/67)  BP(mean): --  RR: 18 (25 Dec 2023 12:00) (12 - 25)  SpO2: 99% (25 Dec 2023 12:00) (94% - 99%)    Parameters below as of 25 Dec 2023 12:00  Patient On (Oxygen Delivery Method): room air      I&O's Summary    24 Dec 2023 07:01  -  25 Dec 2023 07:00  --------------------------------------------------------  IN: 1580 mL / OUT: 760 mL / NET: 820 mL    25 Dec 2023 07:01  -  25 Dec 2023 13:16  --------------------------------------------------------  IN: 600 mL / OUT: 250 mL / NET: 350 mL      MEDICATIONS  (STANDING):  acetaminophen   IVPB .. 1000 milliGRAM(s) IV Intermittent every 6 hours  aspirin Suppository 300 milliGRAM(s) Rectal daily  chlorhexidine 2% Cloths 1 Application(s) Topical daily  dextrose 5%. 1000 milliLiter(s) (100 mL/Hr) IV Continuous <Continuous>  dextrose 5%. 1000 milliLiter(s) (50 mL/Hr) IV Continuous <Continuous>  dextrose 50% Injectable 25 Gram(s) IV Push once  dextrose 50% Injectable 25 Gram(s) IV Push once  dextrose 50% Injectable 12.5 Gram(s) IV Push once  glucagon  Injectable 1 milliGRAM(s) IntraMuscular once  heparin   Injectable 5000 Unit(s) SubCutaneous every 8 hours  insulin glargine Injectable (LANTUS) 20 Unit(s) SubCutaneous at bedtime  insulin lispro (ADMELOG) corrective regimen sliding scale   SubCutaneous every 6 hours  lactated ringers. 1000 milliLiter(s) (100 mL/Hr) IV Continuous <Continuous>  levETIRAcetam   Injectable 250 milliGRAM(s) IV Push every 12 hours  piperacillin/tazobactam IVPB.. 3.375 Gram(s) IV Intermittent every 12 hours  remdesivir  IVPB   IV Intermittent     MEDICATIONS  (PRN):  dextrose Oral Gel 15 Gram(s) Oral once PRN Blood Glucose LESS THAN 70 milliGRAM(s)/deciliter    LABS:                        8.8    12.26 )-----------( 193      ( 25 Dec 2023 12:15 )             25.1     12-25    133<L>  |  101  |  43<H>  ----------------------------<  190<H>  3.8   |  19<L>  |  1.96<H>    Ca    8.1<L>      25 Dec 2023 02:29  Phos  3.4     12-25  Mg     2.10     12-25    TPro  7.1  /  Alb  3.3  /  TBili  1.0  /  DBili  x   /  AST  45<H>  /  ALT  33  /  AlkPhos  58  12-24    PT/INR - ( 25 Dec 2023 12:15 )   PT: 12.9 sec;   INR: 1.15 ratio         PTT - ( 25 Dec 2023 12:15 )  PTT:32.3 sec  Urinalysis Basic - ( 25 Dec 2023 02:29 )    Color: x / Appearance: x / SG: x / pH: x  Gluc: 190 mg/dL / Ketone: x  / Bili: x / Urobili: x   Blood: x / Protein: x / Nitrite: x   Leuk Esterase: x / RBC: x / WBC x   Sq Epi: x / Non Sq Epi: x / Bacteria: x      CAPILLARY BLOOD GLUCOSE      POCT Blood Glucose.: 181 mg/dL (25 Dec 2023 12:05)  POCT Blood Glucose.: 187 mg/dL (25 Dec 2023 05:23)  POCT Blood Glucose.: 181 mg/dL (25 Dec 2023 03:41)      ABG - ( 25 Dec 2023 02:29 )  pH, Arterial: 7.35  pH, Blood: x     /  pCO2: 36    /  pO2: 76    / HCO3: 20    / Base Excess: -5.2  /  SaO2: 95.0              Urinalysis Basic - ( 25 Dec 2023 02:29 )    Color: x / Appearance: x / SG: x / pH: x  Gluc: 190 mg/dL / Ketone: x  / Bili: x / Urobili: x   Blood: x / Protein: x / Nitrite: x   Leuk Esterase: x / RBC: x / WBC x   Sq Epi: x / Non Sq Epi: x / Bacteria: x          Consultant(s) Notes Reviewed:  [x ] YES  [ ] NO    PHYSICAL EXAM:  GENERAL: NAD, well-groomed, well-developed,not in any distress ,NGT +  HEAD:  Atraumatic, Normocephalic  NECK: Supple, No JVD, Normal thyroid  NERVOUS SYSTEM:  Alert & Oriented X3, No focal deficit   CHEST/LUNG: Good air entry bilateral with no  rales, rhonchi, wheezing, or rubs  HEART: Regular rate and rhythm; No murmurs, rubs, or gallops  ABDOMEN: Soft, Nontender, Nondistended; Bowel sounds present, drain+  EXTREMITIES:  2+ Peripheral Pulses, No clubbing, cyanosis, or edema    Care Discussed with Consultants/Other Providers [ x] YES  [ ] NO

## 2023-12-25 NOTE — PROGRESS NOTE ADULT - SUBJECTIVE AND OBJECTIVE BOX
SUBJECTIVE: patient seen and examined. Patient is recovering well. Endorsing mild infraumbilical pain. Denies pain in arm. + BM.    Vital Signs Last 24 Hrs  T(C): 37.2 (25 Dec 2023 04:00), Max: 37.2 (25 Dec 2023 02:15)  T(F): 98.9 (25 Dec 2023 04:00), Max: 99 (25 Dec 2023 02:15)  HR: 82 (25 Dec 2023 07:00) (80 - 91)  BP: 128/67 (24 Dec 2023 18:30) (115/60 - 128/67)  BP(mean): --  RR: 13 (25 Dec 2023 07:00) (12 - 25)  SpO2: 98% (25 Dec 2023 07:00) (94% - 99%)    Parameters below as of 25 Dec 2023 04:00  Patient On (Oxygen Delivery Method): room air      I&O's Detail    24 Dec 2023 07:01  -  25 Dec 2023 07:00  --------------------------------------------------------  IN:    IV PiggyBack: 650 mL    Lactated Ringers: 600 mL    Oral Fluid: 330 mL  Total IN: 1580 mL    OUT:    Voided (mL): 760 mL  Total OUT: 760 mL    Total NET: 820 mL      Physical Exam:  General: nad  Abdomen: ngt, soft, minimally tender at infraumbilical site, lap incisions with strikethrough  Ext: left arm brachial site c/d/i    LABS:                        8.9    10.81 )-----------( 183      ( 25 Dec 2023 06:10 )             26.2     12-25    133<L>  |  101  |  43<H>  ----------------------------<  190<H>  3.8   |  19<L>  |  1.96<H>    Ca    8.1<L>      25 Dec 2023 02:29  Phos  3.4     12-25  Mg     2.10     12-25    TPro  7.1  /  Alb  3.3  /  TBili  1.0  /  DBili  x   /  AST  45<H>  /  ALT  33  /  AlkPhos  58  12-24    PT/INR - ( 23 Dec 2023 15:49 )   PT: 14.7 sec;   INR: 1.31 ratio         PTT - ( 23 Dec 2023 15:49 )  PTT:31.5 sec  Urinalysis Basic - ( 25 Dec 2023 02:29 )    Color: x / Appearance: x / SG: x / pH: x  Gluc: 190 mg/dL / Ketone: x  / Bili: x / Urobili: x   Blood: x / Protein: x / Nitrite: x   Leuk Esterase: x / RBC: x / WBC x   Sq Epi: x / Non Sq Epi: x / Bacteria: x        RADIOLOGY & ADDITIONAL STUDIES:

## 2023-12-25 NOTE — PROGRESS NOTE ADULT - ASSESSMENT
EKG SR RBBB (old per family     Echo < from: Transthoracic Echocardiogram (05.23.22 @ 08:21) >  1. Mitral annular calcification, otherwise normal mitral  valve. Mild-moderate mitral regurgitation.  2. Aortic valve leaflet morphology not well visualized.  Peak transaortic valve gradient equals 18 mm Hg, mean  transaortic valve gradient equals 10 mm Hg, consistent with  mild aortic stenosis. Minimal aortic regurgitation.  3. Mildly dilated left atrium.  LA volume index = 41 cc/m2.  4. Normal left ventricular internal dimensions and wall  thicknesses.  5. Endocardium not well visualized; grossly normal left  ventricular systolic function.  6. Mild diastolic dysfunction (Stage I).  7. The right ventricle is not well visualized;grossly  normal right ventricular systolic function.    < end of copied text >    Assessment and Plan     1) CAD s/p CABG : EKG non ischemic , trop -sera   - echo with normal lv and moderate AS   - c/w metoprolol      2) Covid +  - on redesivir   - c/w supportive management   - t/t per primary team     3) Abd distension   - Surgery/vascular on board , s/p laprotomy and SMA stenting    f/u recs      DVT PPX heparin

## 2023-12-25 NOTE — CONSULT NOTE ADULT - ATTENDING COMMENTS
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

## 2023-12-25 NOTE — PROGRESS NOTE ADULT - ASSESSMENT
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 Patient recovering well.    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 Patient recovering well.    Plan  - gen surg to replace lap incision dressings  - NPO  - LR @ 100  - will place on home ASA and Brilinta when NGT out  - care per SICU    C Team  [id number]

## 2023-12-25 NOTE — CHART NOTE - NSCHARTNOTEFT_GEN_A_CORE
Post Operative Check    Patient is post op from a SMV stent placement. Patient is recovering well.     Vitals    T(C): 37.2 (12-25-23 @ 04:00), Max: 37.2 (12-25-23 @ 02:15)  HR: 82 (12-25-23 @ 07:00) (80 - 91)  BP: 128/67 (12-24-23 @ 18:30) (115/60 - 128/67)  RR: 13 (12-25-23 @ 07:00) (12 - 25)  SpO2: 98% (12-25-23 @ 07:00) (94% - 99%)      12-24 @ 07:01  -  12-25 @ 07:00  --------------------------------------------------------  IN:    IV PiggyBack: 650 mL    Lactated Ringers: 600 mL    Oral Fluid: 330 mL  Total IN: 1580 mL    OUT:    Voided (mL): 760 mL  Total OUT: 760 mL    Total NET: 820 mL          Labs                        8.9    10.81 )-----------( 183      ( 25 Dec 2023 06:10 )             26.2       CBC Full  -  ( 25 Dec 2023 06:10 )  WBC Count : 10.81 K/uL  Hemoglobin : 8.9 g/dL  Hematocrit : 26.2 %  Platelet Count - Automated : 183 K/uL  Mean Cell Volume : 90.7 fL  Mean Cell Hemoglobin : 30.8 pg  Mean Cell Hemoglobin Concentration : 34.0 gm/dL  Auto Neutrophil # : x  Auto Lymphocyte # : x  Auto Monocyte # : x  Auto Eosinophil # : x  Auto Basophil # : x  Auto Neutrophil % : x  Auto Lymphocyte % : x  Auto Monocyte % : x  Auto Eosinophil % : x  Auto Basophil % : x      Physical Exam  General:   Abdomen:      Patient is a 89y old Male s/p Post Operative Check    Patient is post op from a SMA stent placement. Patient is recovering well. Endorsing mild infraumbilical pain. Denies pain in arm. + BM.    Vitals    T(C): 37.2 (12-25-23 @ 04:00), Max: 37.2 (12-25-23 @ 02:15)  HR: 82 (12-25-23 @ 07:00) (80 - 91)  BP: 128/67 (12-24-23 @ 18:30) (115/60 - 128/67)  RR: 13 (12-25-23 @ 07:00) (12 - 25)  SpO2: 98% (12-25-23 @ 07:00) (94% - 99%)      12-24 @ 07:01  -  12-25 @ 07:00  --------------------------------------------------------  IN:    IV PiggyBack: 650 mL    Lactated Ringers: 600 mL    Oral Fluid: 330 mL  Total IN: 1580 mL    OUT:    Voided (mL): 760 mL  Total OUT: 760 mL    Total NET: 820 mL          Labs                        8.9    10.81 )-----------( 183      ( 25 Dec 2023 06:10 )             26.2       CBC Full  -  ( 25 Dec 2023 06:10 )  WBC Count : 10.81 K/uL  Hemoglobin : 8.9 g/dL  Hematocrit : 26.2 %  Platelet Count - Automated : 183 K/uL  Mean Cell Volume : 90.7 fL  Mean Cell Hemoglobin : 30.8 pg  Mean Cell Hemoglobin Concentration : 34.0 gm/dL  Auto Neutrophil # : x  Auto Lymphocyte # : x  Auto Monocyte # : x  Auto Eosinophil # : x  Auto Basophil # : x  Auto Neutrophil % : x  Auto Lymphocyte % : x  Auto Monocyte % : x  Auto Eosinophil % : x  Auto Basophil % : x      Physical Exam  General: nad  Abdomen: ngt, soft, minimally tender at infraumbilical site, lap incisions with strikethrough  Ext: left arm brachial site c/d/i      Patient is a 89y old Male CAD s/p CABG s/p stents (last stent May 2022) on ASA/brilinta, s/p PPM, DM2, CKD (baseline Cr 1.2-1.3 as per family), PVD, HTN, HLD, CVA x3 (without residual deficits), and 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 Patient recovering well.    Plan  - gen surg to replace lap incision dressings  - NPO  - LR @ 100  - will place on home ASA and Brilinta when NGT out  - care per SICU    C Team  [id number] Post Operative Check    Patient is post op from a SMA stent placement. Patient is recovering well. Endorsing mild infraumbilical pain. Denies pain in arm. + BM.    Vitals    T(C): 37.2 (12-25-23 @ 04:00), Max: 37.2 (12-25-23 @ 02:15)  HR: 82 (12-25-23 @ 07:00) (80 - 91)  BP: 128/67 (12-24-23 @ 18:30) (115/60 - 128/67)  RR: 13 (12-25-23 @ 07:00) (12 - 25)  SpO2: 98% (12-25-23 @ 07:00) (94% - 99%)      12-24 @ 07:01  -  12-25 @ 07:00  --------------------------------------------------------  IN:    IV PiggyBack: 650 mL    Lactated Ringers: 600 mL    Oral Fluid: 330 mL  Total IN: 1580 mL    OUT:    Voided (mL): 760 mL  Total OUT: 760 mL    Total NET: 820 mL          Labs                        8.9    10.81 )-----------( 183      ( 25 Dec 2023 06:10 )             26.2       CBC Full  -  ( 25 Dec 2023 06:10 )  WBC Count : 10.81 K/uL  Hemoglobin : 8.9 g/dL  Hematocrit : 26.2 %  Platelet Count - Automated : 183 K/uL  Mean Cell Volume : 90.7 fL  Mean Cell Hemoglobin : 30.8 pg  Mean Cell Hemoglobin Concentration : 34.0 gm/dL  Auto Neutrophil # : x  Auto Lymphocyte # : x  Auto Monocyte # : x  Auto Eosinophil # : x  Auto Basophil # : x  Auto Neutrophil % : x  Auto Lymphocyte % : x  Auto Monocyte % : x  Auto Eosinophil % : x  Auto Basophil % : x      Physical Exam  General: nad  Abdomen: ngt, soft, minimally tender at infraumbilical site, lap incisions with strikethrough  Ext: left arm brachial site c/d/i      Patient is a 89y old Male CAD s/p CABG s/p stents (last stent May 2022) on ASA/brilinta, s/p PPM, DM2, CKD (baseline Cr 1.2-1.3 as per family), PVD, HTN, HLD, CVA x3 (without residual deficits), and 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 Patient recovering well.    Plan  - gen surg to replace lap incision dressings  - NPO  - LR @ 100  - will place on home ASA and Brilinta when NGT out  - care per SICU    C Team  [id number]

## 2023-12-25 NOTE — PROGRESS NOTE ADULT - ASSESSMENT
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       Problem/Plan - 1:  ·  Problem: 2019 novel coronavirus disease (COVID-19).   ·  Plan: - Patient COVID19 positive with complaints of dry cough, URI symptoms, fevers to 101.5 at home, generalized malaise with poor oral intake, and diarrhea  - Here COVID19 positive, CXR with bibasilar atelectasis but no consolidation, saturating well on RA  - Given MBALE will hold off on remdesivir for now, no current indication for dexamethasone as patient saturating well on RA  - Monitor COVID19 inflammatory labs  - Monitor respiratory status, monitor fever curve  - Has bibasilar crackles on.     Problem/Plan - 2:  ·  Problem: Sepsis, unspecified organism.  ·  Plan: - Meets sepsis criteria with leukocytosis, elevated respiratory rate, COVID19+ swap  - Lactate mildly elevated, s/p 1L in ED -> repeat lactate in AM  - No complaints of  issues -> will hold off on UCx for now  - Given bandemia would check BCx x2  - Diarrhea also likely 2/2 COVID19, monitor for additional episodes  - Trend leukocytosis/bandemia, monitor fever curve, monitor respiratory rate.     Problem/Plan - 3:  ·  Problem: Acute chest pain.  ·  Plan: - New onset chest pain, bilateral, pinprick sensation, only with coughing no chest pain when not coughing, no chest pain with exertion  - Patient denies dizziness, palpitations, syncope  - Troponin here indeterminant but stable compared to prior levels  - EKG non-ischemic  - Given history of CAD with recent stent in May 2022 will monitor on telemetry for now, repeat troponin in AM, check TTE, check TSH, lipid profile, A1C in AM  - Cards help appreciated.   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  -CTA pending asd dimer also high.      Problem/Plan - 4:  ·  Problem: Acute renal failure superimposed on chronic kidney disease.  ·  Plan: - Baseline creatinine 1.2 - 1.3 as per family  - Currently has Cr 1.99 -> 1.77  - Likely 2/2 sepsis 2/2 COVID 19 with increased insensible losses and poor oral intake  - Will place on additional LR at 100 cc/hr fr 10 hrs  - Check UA, check urine sodium/creatinine ratio  - Trend BUN/Cr  - Condom catheter to allow for I/O monitoring  - Renal help appreciated.      Problem/Plan - 5:  ·  Problem: Toxic metabolic encephalopathy.  ·  Plan: - Family reports patient with worsening memory over the past ~6 months, now worsened acutely over the past few days  - Here on examination is AAO x2 (to self and place, not to time) which seems to be his new baseline over the past 6  months  - Likely has worsening confusion 2/2 age and sepsis with COVID19  - Likely outpatient follow up with his PCP for evaluation of his worsening memory issues.     Problem/Plan - 6:  ·  Problem: Type 2 diabetes mellitus with hyperglycemia.  ·  Plan: - Restart home insulin therapy but at decreased dosing -> lantus 18U qHS and admelog 2U qAC  - Monitor FS, low dose ISS qAC, CC diet.     Problem/Plan - 7:  ·  Problem: CAD (coronary artery disease).  ·  Plan: - c/w aspirin and brilinta  - Chest pain work up as above.     Problem/Plan - 8:  ·  Problem: Essential hypertension.  ·  Plan: - c/w metoprolol.     Problem/Plan - 9:  ·  Problem: Hyperlipidemia.  ·  Plan: - c/w statin therapy with therapeutic interchange.     Problem/Plan - 10:  ·  Problem: Myoclonic jerking.  ·  Plan; - c/w keppra  - Fall precautions.     Problem/Plan - 11:  ·  Problem: History of CVA (cerebrovascular accident).   ·  Plan: - c/w aspirin, statin therapy.     Problem/Plan - 12:  ·  Problem: SMA Thrombosis with  Ischemia ; .   ·  Plan: S/P  S/p SMA angiography with stent placement with diagnostic laparoscopy 12/24,   Surgery following    Over all management per [facility] and D/W team.        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       Problem/Plan - 1:  ·  Problem: 2019 novel coronavirus disease (COVID-19).   ·  Plan: - Patient COVID19 positive with complaints of dry cough, URI symptoms, fevers to 101.5 at home, generalized malaise with poor oral intake, and diarrhea  - Here COVID19 positive, CXR with bibasilar atelectasis but no consolidation, saturating well on RA  - Given MABLE will hold off on remdesivir for now, no current indication for dexamethasone as patient saturating well on RA  - Monitor COVID19 inflammatory labs  - Monitor respiratory status, monitor fever curve  - Has bibasilar crackles on.     Problem/Plan - 2:  ·  Problem: Sepsis, unspecified organism.  ·  Plan: - Meets sepsis criteria with leukocytosis, elevated respiratory rate, COVID19+ swap  - Lactate mildly elevated, s/p 1L in ED -> repeat lactate in AM  - No complaints of  issues -> will hold off on UCx for now  - Given bandemia would check BCx x2  - Diarrhea also likely 2/2 COVID19, monitor for additional episodes  - Trend leukocytosis/bandemia, monitor fever curve, monitor respiratory rate.     Problem/Plan - 3:  ·  Problem: Acute chest pain.  ·  Plan: - New onset chest pain, bilateral, pinprick sensation, only with coughing no chest pain when not coughing, no chest pain with exertion  - Patient denies dizziness, palpitations, syncope  - Troponin here indeterminant but stable compared to prior levels  - EKG non-ischemic  - Given history of CAD with recent stent in May 2022 will monitor on telemetry for now, repeat troponin in AM, check TTE, check TSH, lipid profile, A1C in AM  - Cards help appreciated.   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  -CTA pending asd dimer also high.      Problem/Plan - 4:  ·  Problem: Acute renal failure superimposed on chronic kidney disease.  ·  Plan: - Baseline creatinine 1.2 - 1.3 as per family  - Currently has Cr 1.99 -> 1.77  - Likely 2/2 sepsis 2/2 COVID 19 with increased insensible losses and poor oral intake  - Will place on additional LR at 100 cc/hr fr 10 hrs  - Check UA, check urine sodium/creatinine ratio  - Trend BUN/Cr  - Condom catheter to allow for I/O monitoring  - Renal help appreciated.      Problem/Plan - 5:  ·  Problem: Toxic metabolic encephalopathy.  ·  Plan: - Family reports patient with worsening memory over the past ~6 months, now worsened acutely over the past few days  - Here on examination is AAO x2 (to self and place, not to time) which seems to be his new baseline over the past 6  months  - Likely has worsening confusion 2/2 age and sepsis with COVID19  - Likely outpatient follow up with his PCP for evaluation of his worsening memory issues.     Problem/Plan - 6:  ·  Problem: Type 2 diabetes mellitus with hyperglycemia.  ·  Plan: - Restart home insulin therapy but at decreased dosing -> lantus 18U qHS and admelog 2U qAC  - Monitor FS, low dose ISS qAC, CC diet.     Problem/Plan - 7:  ·  Problem: CAD (coronary artery disease).  ·  Plan: - c/w aspirin and brilinta  - Chest pain work up as above.     Problem/Plan - 8:  ·  Problem: Essential hypertension.  ·  Plan: - c/w metoprolol.     Problem/Plan - 9:  ·  Problem: Hyperlipidemia.  ·  Plan: - c/w statin therapy with therapeutic interchange.     Problem/Plan - 10:  ·  Problem: Myoclonic jerking.  ·  Plan; - c/w keppra  - Fall precautions.     Problem/Plan - 11:  ·  Problem: History of CVA (cerebrovascular accident).   ·  Plan: - c/w aspirin, statin therapy.     Problem/Plan - 12:  ·  Problem: SMA Thrombosis with  Ischemia ; .   ·  Plan: S/P  S/p SMA angiography with stent placement with diagnostic laparoscopy 12/24,   Surgery following    Over all management per [facility] and D/W team.

## 2023-12-25 NOTE — CONSULT NOTE ADULT - ASSESSMENT
ASSESSMENT: Surgery consulted on 12/24 for abdominal pain and distension. CTAP obtained which showed  partially occlusive calcified and non-calcified plaque just distal to take-off of the SMA, with estimated at least 75% luminal narrowing. Limited evaluation of its distal branches. Patient taken emergently to OR on 12/24 with general surgery and vascular surgery. Patient s/p diagnostic laparoscopy and SMA stent placement with brachial cutdown. SICU consulted postoperatively for HD monitoring.     PLAN:   Neurologic:  -A&Ox2 (baseline per family)   -Continue Keppra- history of myoclonic jerks   -Pain: Tylenol  -Exam before pain medications    Respiratory:  -Maintain O2 saturation >92%  -COVID +, on Remdesivir     Cardiovascular:  -MAP goal >65  -History of HTN- holding home Metoprolol and Ranolazine     Gastrointestinal/Nutrition:  -Diet: NPO  -NGT in place, monitor output     Genitourinary/Renal:  -Moss   -MABLE on CKD (Baseline Cr 1.2-1.3)   -IVF: LR @ 100   -Monitor strict I/Os     Hematologic:  -DVT ppx: SQH   -Continue ASA per rectum   -Resume Brilinta when able to tolerate PO     Infectious Disease:  -Abx: Zosyn/ Remdesivir   -Monitor WBC   -Monitor fever curve     Endocrine:  -T2DM   -ISS, Lantus 18U   -Monitor blood glucose     Disposition: SICU  ASSESSMENT: Surgery consulted on 12/24 for abdominal pain and distension. CTAP obtained which showed  partially occlusive calcified and non-calcified plaque just distal to take-off of the SMA, with estimated at least 75% luminal narrowing. Limited evaluation of its distal branches. Patient taken emergently to OR on 12/24 with general surgery and vascular surgery. Patient s/p diagnostic laparoscopy and SMA stent placement with brachial cutdown. SICU consulted postoperatively for HD monitoring.     PLAN:   Neurologic:  -A&Ox2 (baseline per family)   -Continue Keppra- history of myoclonic jerks   -Pain: Tylenol  -Exam before pain medications    Respiratory:  -Maintain O2 saturation >92%  -COVID + on remdesivir    Cardiovascular:  -MAP goal >65  -History of HTN- holding home Metoprolol and Ranolazine     Gastrointestinal/Nutrition:  -Diet: NPO  -NGT in place, monitor output     Genitourinary/Renal:  -Moss   -MABLE on CKD (Baseline Cr 1.2-1.3)   -IVF: LR @ 100   -Monitor strict I/Os     Hematologic:  -DVT ppx: SQH   -Continue ASA per rectum   -Resume Brilinta when able to tolerate PO     Infectious Disease:  -Abx: Zosyn/Remdesivir   -Monitor WBC   -Monitor fever curve     Endocrine:  -T2DM   -ISS, Lantus 20U   -Monitor blood glucose     Disposition: SICU

## 2023-12-25 NOTE — PROGRESS NOTE ADULT - ASSESSMENT
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 CTA demonstrating mesenteric fat stranding associated with ascending/transverse colon. S/p SMA angiography with stent placement with diagnostic laparoscopy 12/24, small bowel and visible colon viable, some inflammation of omentum in RUQ.     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 CTA demonstrating mesenteric fat stranding associated with ascending/transverse colon. S/p SMA angiography with stent placement with diagnostic laparoscopy 12/24, small bowel and visible colon viable, some inflammation of omentum in RUQ.     - No further general surgery intervention planned  - Pain control   - Care per SICU and primary     B Team Surgery   [id number]

## 2023-12-25 NOTE — CONSULT NOTE ADULT - CRITICAL CARE ATTENDING COMMENT
Patient overnight, lethargic, bleeding around port site.  N mentating, pain controlled, slightly lethargic  resp on room air  cv hemodynamically stable  gi npo, ivf, ngt, keep ngt in place, poor mental status  gu/renal adequate uop  heme vte ppx, on asa  id zosyn empiric  endo no changes    The patient is a critical care patient with life threatening hemodynamic and metabolic instability in SICU.  I have personally interviewed when possible and examined the patient, reviewed data and laboratory tests/x-rays and all pertinent electronic images.  I was physically present for the key portions of the evaluation and management (E/M) service provided.   The SICU team has a constant risk benefit analyzes discussion with the primary team, all consultants, House Staff and PA's on all decisions.  These diagnoses are unrelated to the surgical procedure noted above.  I meet with family if needed to get further history, discuss the case and make care decisions for this patient who might not be able to participate.  Time involved in performance of separately billable procedures was not counted toward my critical care time. There is no overlap.  I spent 55-75 minutes ( 0800Hrs-0915Hrs in AM/ 1600Hrs-1715Hrs in PM, or other time indicated) of critical care time for the diagnoses, assessment, plan and interventions.  This time excludes time spent on separate procedures and teaching.

## 2023-12-25 NOTE — PROGRESS NOTE ADULT - SUBJECTIVE AND OBJECTIVE BOX
Aashish Boyer MD  Interventional Cardiology / Endovascular Specialist  [place] Office : [address] [place] [address] [place] [address]  Tel:   [place] Office : [address] [facility] [place] [address]  Tel: [phone]    Pt lying in bed in NAD   	  MEDICATIONS:  heparin   Injectable 5000 Unit(s) SubCutaneous every 8 hours  piperacillin/tazobactam IVPB.. 3.375 Gram(s) IV Intermittent every 12 hours  remdesivir  IVPB   IV Intermittent   acetaminophen   IVPB .. 1000 milliGRAM(s) IV Intermittent every 6 hours  aspirin Suppository 300 milliGRAM(s) Rectal daily  levETIRAcetam   Injectable 250 milliGRAM(s) IV Push every 12 hours  dextrose 50% Injectable 25 Gram(s) IV Push once  dextrose 50% Injectable 12.5 Gram(s) IV Push once  dextrose 50% Injectable 25 Gram(s) IV Push once  dextrose Oral Gel 15 Gram(s) Oral once PRN  glucagon  Injectable 1 milliGRAM(s) IntraMuscular once  insulin glargine Injectable (LANTUS) 20 Unit(s) SubCutaneous at bedtime  insulin lispro (ADMELOG) corrective regimen sliding scale   SubCutaneous every 6 hours  chlorhexidine 2% Cloths 1 Application(s) Topical daily  dextrose 5%. 1000 milliLiter(s) IV Continuous <Continuous>  dextrose 5%. 1000 milliLiter(s) IV Continuous <Continuous>  lactated ringers. 1000 milliLiter(s) IV Continuous <Continuous>      PAST MEDICAL/SURGICAL HISTORY  PAST MEDICAL & SURGICAL HISTORY:  Hyperlipemia      Hypertension      Coronary Artery Disease      Diabetes Mellitus Type II      Stented Coronary Artery  total 5 stents, last stent 5/2019      Neuropathy      Myocardial infarction      Stroke  mild left facial numbness   no other residuals verbalized      Myoclonic jerking      Stage 3 chronic kidney disease      History of Cataract Extraction      Hx of CABG      H/O coronary angiogram      S/P coronary artery stent placement  1/6/09      S/P placement of cardiac pacemaker          SOCIAL HISTORY: Substance Use (street drugs): ( x ) never used  (  ) other:    FAMILY HISTORY:  No pertinent family history in first degree relatives      PHYSICAL EXAM:  T(C): 37.5 (12-25-23 @ 12:00), Max: 37.5 (12-25-23 @ 12:00)  HR: 84 (12-25-23 @ 15:00) (79 - 87)  BP: 128/67 (12-24-23 @ 18:30) (128/67 - 128/67)  RR: 17 (12-25-23 @ 15:00) (12 - 25)  SpO2: 98% (12-25-23 @ 15:00) (94% - 99%)  Wt(kg): --  I&O's Summary    24 Dec 2023 07:01  -  25 Dec 2023 07:00  --------------------------------------------------------  IN: 1580 mL / OUT: 760 mL / NET: 820 mL    25 Dec 2023 07:01  -  25 Dec 2023 17:39  --------------------------------------------------------  IN: 900 mL / OUT: 350 mL / NET: 550 mL    GENERAL: confused   EYES: conjunctiva and sclera clear  ENMT: No tonsillar erythema, exudates, or enlargement  Cardiovascular: Normal S1 S2, No JVD, 2/6 systolic murmur   Respiratory: basal creps + 	  Gastrointestinal:  s/p laprotomy and SMA stenting  	  Extremities: No edema                            8.8    12.26 )-----------( 193      ( 25 Dec 2023 12:15 )             25.1     12-25    133<L>  |  101  |  43<H>  ----------------------------<  190<H>  3.8   |  19<L>  |  1.96<H>    Ca    8.1<L>      25 Dec 2023 02:29  Phos  3.4     12-25  Mg     2.10     12-25    TPro  7.1  /  Alb  3.3  /  TBili  1.0  /  DBili  x   /  AST  45<H>  /  ALT  33  /  AlkPhos  58  12-24    proBNP:   Lipid Profile:   HgA1c:   TSH:     Consultant(s) Notes Reviewed:  [x ] YES  [ ] NO    Care Discussed with Consultants/Other Providers [ x] YES  [ ] NO    Imaging Personally Reviewed independently:  [x] YES  [ ] NO    All labs, radiologic studies, vitals, orders and medications list reviewed. Patient is seen and examined at bedside. Case discussed with medical team.                 Aashish Boyer MD  Interventional Cardiology / Endovascular Specialist  [place] Office : [phone] [place] [address] [place] [address]  Tel:   [place] Office : [address] [facility] [place] [address]  Tel: [phone]    Pt lying in bed in NAD   	  MEDICATIONS:  heparin   Injectable 5000 Unit(s) SubCutaneous every 8 hours  piperacillin/tazobactam IVPB.. 3.375 Gram(s) IV Intermittent every 12 hours  remdesivir  IVPB   IV Intermittent   acetaminophen   IVPB .. 1000 milliGRAM(s) IV Intermittent every 6 hours  aspirin Suppository 300 milliGRAM(s) Rectal daily  levETIRAcetam   Injectable 250 milliGRAM(s) IV Push every 12 hours  dextrose 50% Injectable 25 Gram(s) IV Push once  dextrose 50% Injectable 12.5 Gram(s) IV Push once  dextrose 50% Injectable 25 Gram(s) IV Push once  dextrose Oral Gel 15 Gram(s) Oral once PRN  glucagon  Injectable 1 milliGRAM(s) IntraMuscular once  insulin glargine Injectable (LANTUS) 20 Unit(s) SubCutaneous at bedtime  insulin lispro (ADMELOG) corrective regimen sliding scale   SubCutaneous every 6 hours  chlorhexidine 2% Cloths 1 Application(s) Topical daily  dextrose 5%. 1000 milliLiter(s) IV Continuous <Continuous>  dextrose 5%. 1000 milliLiter(s) IV Continuous <Continuous>  lactated ringers. 1000 milliLiter(s) IV Continuous <Continuous>      PAST MEDICAL/SURGICAL HISTORY  PAST MEDICAL & SURGICAL HISTORY:  Hyperlipemia      Hypertension      Coronary Artery Disease      Diabetes Mellitus Type II      Stented Coronary Artery  total 5 stents, last stent 5/2019      Neuropathy      Myocardial infarction      Stroke  mild left facial numbness   no other residuals verbalized      Myoclonic jerking      Stage 3 chronic kidney disease      History of Cataract Extraction      Hx of CABG      H/O coronary angiogram      S/P coronary artery stent placement  1/6/09      S/P placement of cardiac pacemaker          SOCIAL HISTORY: Substance Use (street drugs): ( x ) never used  (  ) other:    FAMILY HISTORY:  No pertinent family history in first degree relatives      PHYSICAL EXAM:  T(C): 37.5 (12-25-23 @ 12:00), Max: 37.5 (12-25-23 @ 12:00)  HR: 84 (12-25-23 @ 15:00) (79 - 87)  BP: 128/67 (12-24-23 @ 18:30) (128/67 - 128/67)  RR: 17 (12-25-23 @ 15:00) (12 - 25)  SpO2: 98% (12-25-23 @ 15:00) (94% - 99%)  Wt(kg): --  I&O's Summary    24 Dec 2023 07:01  -  25 Dec 2023 07:00  --------------------------------------------------------  IN: 1580 mL / OUT: 760 mL / NET: 820 mL    25 Dec 2023 07:01  -  25 Dec 2023 17:39  --------------------------------------------------------  IN: 900 mL / OUT: 350 mL / NET: 550 mL    GENERAL: confused   EYES: conjunctiva and sclera clear  ENMT: No tonsillar erythema, exudates, or enlargement  Cardiovascular: Normal S1 S2, No JVD, 2/6 systolic murmur   Respiratory: basal creps + 	  Gastrointestinal:  s/p laprotomy and SMA stenting  	  Extremities: No edema                            8.8    12.26 )-----------( 193      ( 25 Dec 2023 12:15 )             25.1     12-25    133<L>  |  101  |  43<H>  ----------------------------<  190<H>  3.8   |  19<L>  |  1.96<H>    Ca    8.1<L>      25 Dec 2023 02:29  Phos  3.4     12-25  Mg     2.10     12-25    TPro  7.1  /  Alb  3.3  /  TBili  1.0  /  DBili  x   /  AST  45<H>  /  ALT  33  /  AlkPhos  58  12-24    proBNP:   Lipid Profile:   HgA1c:   TSH:     Consultant(s) Notes Reviewed:  [x ] YES  [ ] NO    Care Discussed with Consultants/Other Providers [ x] YES  [ ] NO    Imaging Personally Reviewed independently:  [x] YES  [ ] NO    All labs, radiologic studies, vitals, orders and medications list reviewed. Patient is seen and examined at bedside. Case discussed with medical team.

## 2023-12-25 NOTE — CHART NOTE - NSCHARTNOTEFT_GEN_A_CORE
Post Operative Note  Patient: SHAIK DONOHUE 89y (05-Oct-1933) Male   MRN: [id number]  Location: [place] [address]  Visit: 12-23-23 Inpatient  Date: 12-25-23 @ 04:23    Procedure: S/P diagnostic laparoscopy (gen surg) and SMA stent placement with brachial cutdown (with vascular surg) on 12/25.    Subjective: Patient seen and examined post operatively. Resting in bed, NGT in place.       Objective:  Vitals: T(F): 99 (12-25-23 @ 02:15), Max: 99 (12-25-23 @ 02:15)  HR: 84 (12-25-23 @ 04:00)  BP: 128/67 (12-24-23 @ 18:30) (115/60 - 137/55)  RR: 19 (12-25-23 @ 04:00)  SpO2: 99% (12-25-23 @ 04:00)  Vent Settings:     In:   12-24-23 @ 07:01  -  12-25-23 @ 04:23  --------------------------------------------------------  IN: 730 mL      IV Fluids: dextrose 5%. 1000 milliLiter(s) (50 mL/Hr) IV Continuous <Continuous>  dextrose 5%. 1000 milliLiter(s) (100 mL/Hr) IV Continuous <Continuous>  lactated ringers. 1000 milliLiter(s) (100 mL/Hr) IV Continuous <Continuous>  potassium chloride  10 mEq/100 mL IVPB 10 milliEquivalent(s) IV Intermittent every 1 hour      Out:   12-24-23 @ 07:01  -  12-25-23 @ 04:23  --------------------------------------------------------  OUT: 540 mL      EBL:     Voided Urine:   12-24-23 @ 07:01  -  12-25-23 @ 04:23  --------------------------------------------------------  OUT: 540 mL      Moss Catheter: yes      NG Tube: yes        Physical Examination:  Gen: NAD  Resp: no increased WOB  Cardiac: appears well perfused, extremities warm  Abd: soft, distended, incision sites with serosanguinous strikethrough   Extremities: warm, well perfused, L upper arm incision c/d/i, palpable pulses in UEs        Imaging:  No post-op imaging studies    Assessment:  89yMale patient S/P diagnostic laparoscopy (gen surg) and SMA stent placement with brachial cutdown (with vascular surg) on 12/25.    Plan:  - IV Abx: Zosyn/ Remdesivir   - exam before meds,  Pain control   - Diet: npo, nbg  - IVF  -monitor u/o, monitor ngt o/p  - Moss   - cont keppra  - f/u labs  - DVT ppx:  sqh  - appreciate care per SICU    Surgery team B  [id number]    Date/Time: 12-25-23 @ 04:23 Post Operative Note  Patient: SHAIK DONOHUE 89y (05-Oct-1933) Male   MRN: [id number]  Location: [place] [address]  Visit: 12-23-23 Inpatient  Date: 12-25-23 @ 04:23    Procedure: S/P diagnostic laparoscopy (gen surg) and SMA stent placement with brachial cutdown (with vascular surg) on 12/25.    Subjective: Patient seen and examined post operatively. Resting in bed, NGT in place.       Objective:  Vitals: T(F): 99 (12-25-23 @ 02:15), Max: 99 (12-25-23 @ 02:15)  HR: 84 (12-25-23 @ 04:00)  BP: 128/67 (12-24-23 @ 18:30) (115/60 - 137/55)  RR: 19 (12-25-23 @ 04:00)  SpO2: 99% (12-25-23 @ 04:00)  Vent Settings:     In:   12-24-23 @ 07:01  -  12-25-23 @ 04:23  --------------------------------------------------------  IN: 730 mL      IV Fluids: dextrose 5%. 1000 milliLiter(s) (50 mL/Hr) IV Continuous <Continuous>  dextrose 5%. 1000 milliLiter(s) (100 mL/Hr) IV Continuous <Continuous>  lactated ringers. 1000 milliLiter(s) (100 mL/Hr) IV Continuous <Continuous>  potassium chloride  10 mEq/100 mL IVPB 10 milliEquivalent(s) IV Intermittent every 1 hour      Out:   12-24-23 @ 07:01  -  12-25-23 @ 04:23  --------------------------------------------------------  OUT: 540 mL      EBL:     Voided Urine:   12-24-23 @ 07:01  -  12-25-23 @ 04:23  --------------------------------------------------------  OUT: 540 mL      Moss Catheter: yes      NG Tube: yes        Physical Examination:  Gen: NAD  Resp: no increased WOB  Cardiac: appears well perfused, extremities warm  Abd: soft, distended, incision sites with serosanguinous strikethrough   Extremities: warm, well perfused, L upper arm incision c/d/i, palpable pulses in UEs        Imaging:  No post-op imaging studies    Assessment:  89yMale patient S/P diagnostic laparoscopy (gen surg) and SMA stent placement with brachial cutdown (with vascular surg) on 12/25.    Plan:  - IV Abx: Zosyn/ Remdesivir   - exam before meds,  Pain control   - Diet: npo, nbg  - IVF  -monitor u/o, monitor ngt o/p  - Moss   - cont keppra  - f/u labs  - DVT ppx:  sqh  - appreciate care per SICU    Surgery team B  [id number]    Date/Time: 12-25-23 @ 04:23 Post Operative Note  Patient: SHAIK DONOHUE 89y (05-Oct-1933) Male   MRN: [id number]  Location: [place] [address]  Visit: 12-23-23 Inpatient  Date: 12-25-23 @ 04:23    Procedure: S/P diagnostic laparoscopy (gen surg) and SMA stent placement with left brachial cutdown (with vascular surg) on 12/25.    Subjective: Patient seen and examined post operatively. Resting in bed, very sleepy, just received .5 dilaudid.  NGT in place.       Objective:  Vitals: T(F): 99 (12-25-23 @ 02:15), Max: 99 (12-25-23 @ 02:15)  HR: 84 (12-25-23 @ 04:00)  BP: 128/67 (12-24-23 @ 18:30) (115/60 - 137/55)  RR: 19 (12-25-23 @ 04:00)  SpO2: 99% (12-25-23 @ 04:00)  Vent Settings:     In:   12-24-23 @ 07:01  -  12-25-23 @ 04:23  --------------------------------------------------------  IN: 730 mL      IV Fluids: dextrose 5%. 1000 milliLiter(s) (50 mL/Hr) IV Continuous <Continuous>  dextrose 5%. 1000 milliLiter(s) (100 mL/Hr) IV Continuous <Continuous>  lactated ringers. 1000 milliLiter(s) (100 mL/Hr) IV Continuous <Continuous>  potassium chloride  10 mEq/100 mL IVPB 10 milliEquivalent(s) IV Intermittent every 1 hour      Out:   12-24-23 @ 07:01  -  12-25-23 @ 04:23  --------------------------------------------------------  OUT: 540 mL      EBL:     Voided Urine:   12-24-23 @ 07:01  -  12-25-23 @ 04:23  --------------------------------------------------------  OUT: 540 mL      Moss Catheter: yes      NG Tube: yes        Physical Examination:  Gen: NAD, NGT in place  Resp: no increased WOB  Cardiac: regular rate, appears well perfused, extremities warm  Abd: soft, distended, incision sites with pink SS soaked gauze    Extremities: warm, well perfused, L upper arm incision c/d/i, palpable L radial pulse        Imaging:  No post-op imaging studies    Assessment:  89yMale patient S/P diagnostic laparoscopy (gen surg) and SMA stent placement with brachial cutdown (with vascular surg) on 12/25.    Plan:  - IV Abx: Zosyn/ Remdesivir   - exam before meds,  Pain control   - Diet: npo  - NGT  - IVF  -monitor u/o, monitor ngt o/p  - Moss   - cont keppra  - f/u labs  - DVT ppx:  [facility]  - appreciate care per SICU    Surgery team B  [id number]    Date/Time: 12-25-23 @ 04:23 Post Operative Note  Patient: SHAIK DONOHUE 89y (05-Oct-1933) Male   MRN: [id number]  Location: [place] [address]  Visit: 12-23-23 Inpatient  Date: 12-25-23 @ 04:23    Procedure: S/P diagnostic laparoscopy (gen surg) and SMA stent placement with left brachial cutdown (with vascular surg) on 12/25.    Subjective: Patient seen and examined post operatively. Resting in bed, very sleepy, just received .5 dilaudid.  NGT in place.       Objective:  Vitals: T(F): 99 (12-25-23 @ 02:15), Max: 99 (12-25-23 @ 02:15)  HR: 84 (12-25-23 @ 04:00)  BP: 128/67 (12-24-23 @ 18:30) (115/60 - 137/55)  RR: 19 (12-25-23 @ 04:00)  SpO2: 99% (12-25-23 @ 04:00)  Vent Settings:     In:   12-24-23 @ 07:01  -  12-25-23 @ 04:23  --------------------------------------------------------  IN: 730 mL      IV Fluids: dextrose 5%. 1000 milliLiter(s) (50 mL/Hr) IV Continuous <Continuous>  dextrose 5%. 1000 milliLiter(s) (100 mL/Hr) IV Continuous <Continuous>  lactated ringers. 1000 milliLiter(s) (100 mL/Hr) IV Continuous <Continuous>  potassium chloride  10 mEq/100 mL IVPB 10 milliEquivalent(s) IV Intermittent every 1 hour      Out:   12-24-23 @ 07:01  -  12-25-23 @ 04:23  --------------------------------------------------------  OUT: 540 mL      EBL:     Voided Urine:   12-24-23 @ 07:01  -  12-25-23 @ 04:23  --------------------------------------------------------  OUT: 540 mL      Moss Catheter: yes      NG Tube: yes        Physical Examination:  Gen: NAD, NGT in place  Resp: no increased WOB  Cardiac: regular rate, appears well perfused, extremities warm  Abd: soft, distended, incision sites with pink SS soaked gauze    Extremities: warm, well perfused, L upper arm incision c/d/i, palpable L radial pulse        Imaging:  No post-op imaging studies    Assessment:  89yMale patient S/P diagnostic laparoscopy (gen surg) and SMA stent placement with brachial cutdown (with vascular surg) on 12/25.    Plan:  - IV Abx: Zosyn/ Remdesivir   - exam before meds,  Pain control   - Diet: npo  - NGT  - IVF  -monitor u/o, monitor ngt o/p  - Moss   - cont keppra  - f/u labs  - DVT ppx:  [facility]  - appreciate care per SICU    Surgery team B  [id number]    Date/Time: 12-25-23 @ 04:23

## 2023-12-25 NOTE — PROGRESS NOTE ADULT - SUBJECTIVE AND OBJECTIVE BOX
SURGERY PROGRESS NOTE    SUBJECTIVE / 24H EVENTS:  Patient seen and examined on morning rounds. S/p OR overnight, now in SICU       OBJECTIVE:  VITAL SIGNS:  T(C): 37.2 (23 @ 04:00), Max: 37.2 (23 @ 02:15)  HR: 82 (23 @ 07:00) (80 - 91)  BP: 128/67 (23 @ 18:30) (115/60 - 128/67)  RR: 13 (23 @ 07:00) ( - )  SpO2: 98% (23 @ 07:00) (94% - 99%)  Daily     Daily Weight in k.9 (25 Dec 2023 06:00)  POCT Blood Glucose.: 187 mg/dL (23 @ 05:23)  POCT Blood Glucose.: 181 mg/dL (23 @ 03:41)  POCT Blood Glucose.: 163 mg/dL (23 @ 12:11)      PHYSICAL EXAM:  Gen: NAD  Resp: unlabored on RA  Cardiac: RRR  Abd: soft, distended, incision sites with serosanguinous strikethrough   Extremities: warm, well perfused, L upper arm incision c/d/i, palpable pulses in UEs      23 @ 07:01  -  23 @ 07:00  --------------------------------------------------------  IN:    IV PiggyBack: 650 mL    Lactated Ringers: 600 mL    Oral Fluid: 330 mL  Total IN: 1580 mL    OUT:    Voided (mL): 760 mL  Total OUT: 760 mL    Total NET: 820 mL          LAB VALUES:      133<L>  |  101  |  43<H>  ----------------------------<  190<H>  3.8   |  19<L>  |  1.96<H>    Ca    8.1<L>      25 Dec 2023 02:29  Phos  3.4       Mg     2.10         TPro  7.1  /  Alb  3.3  /  TBili  1.0  /  DBili  x   /  AST  45<H>  /  ALT  33  /  AlkPhos  58                                 8.9    10.81 )-----------( 183      ( 25 Dec 2023 06:10 )             26.2     LIVER FUNCTIONS - ( 24 Dec 2023 21:38 )  Alb: 3.3 g/dL / Pro: 7.1 g/dL / ALK PHOS: 58 U/L / ALT: 33 U/L / AST: 45 U/L / GGT: x           PT/INR - ( 23 Dec 2023 15:49 )   PT: 14.7 sec;   INR: 1.31 ratio         PTT - ( 23 Dec 2023 15:49 )  PTT:31.5 sec  ABG - ( 25 Dec 2023 02:29 )  pH, Arterial: 7.35  pH, Blood: x     /  pCO2: 36    /  pO2: 76    / HCO3: 20    / Base Excess: -5.2  /  SaO2: 95.0              CARDIAC MARKERS ( 24 Dec 2023 00:35 )  x     / x     / 348 U/L / x     / 3.6 ng/mL      Urinalysis Basic - ( 25 Dec 2023 02:29 )    Color: x / Appearance: x / SG: x / pH: x  Gluc: 190 mg/dL / Ketone: x  / Bili: x / Urobili: x   Blood: x / Protein: x / Nitrite: x   Leuk Esterase: x / RBC: x / WBC x   Sq Epi: x / Non Sq Epi: x / Bacteria: x        MICROBIOLOGY:      RADIOLOGY:  PACS Image: Image(s) Available (23 @ 18:38)  PACS Image: Image(s) Available (23 @ 18:38)  PACS Image: Image(s) Available (23 @ 12:03)        MEDICATIONS  (STANDING):  acetaminophen   IVPB .. 1000 milliGRAM(s) IV Intermittent every 6 hours  aspirin Suppository 300 milliGRAM(s) Rectal daily  chlorhexidine 2% Cloths 1 Application(s) Topical daily  dextrose 5%. 1000 milliLiter(s) (100 mL/Hr) IV Continuous <Continuous>  dextrose 5%. 1000 milliLiter(s) (50 mL/Hr) IV Continuous <Continuous>  dextrose 50% Injectable 25 Gram(s) IV Push once  dextrose 50% Injectable 25 Gram(s) IV Push once  dextrose 50% Injectable 12.5 Gram(s) IV Push once  glucagon  Injectable 1 milliGRAM(s) IntraMuscular once  heparin   Injectable 5000 Unit(s) SubCutaneous every 8 hours  insulin glargine Injectable (LANTUS) 18 Unit(s) SubCutaneous at bedtime  insulin lispro (ADMELOG) corrective regimen sliding scale   SubCutaneous every 6 hours  lactated ringers. 1000 milliLiter(s) (100 mL/Hr) IV Continuous <Continuous>  levETIRAcetam   Injectable 250 milliGRAM(s) IV Push every 12 hours  piperacillin/tazobactam IVPB.. 3.375 Gram(s) IV Intermittent every 12 hours  remdesivir  IVPB   IV Intermittent     MEDICATIONS  (PRN):  dextrose Oral Gel 15 Gram(s) Oral once PRN Blood Glucose LESS THAN 70 milliGRAM(s)/deciliter        Detail Level: Zone Note Text (......Xxx Chief Complaint.): This diagnosis correlates with the Render Risk Assessment In Note?: no Other (Free Text): Refer to [facility] for further evaluation. 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 Patient agrees to f/u with [facility] and will consider phototherapy treatment.

## 2023-12-25 NOTE — CONSULT NOTE ADULT - SUBJECTIVE AND OBJECTIVE BOX
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 Patient states that he has been having a dry cough for the past week, worsened over the past few days. Denies SOB with the cough, denies hemoptysis. Reports fevers at home to 101.5 with associated diaphoresis. Said that he has significant pinprick like b/l chest pain with his cough but denies any chest pain when not coughing or with ambulation. Also reports rhinorrhea and sore throat. Reports generalized weakness and poor appetite. States his daughter was visiting him over the week end last week and she was positive for COVID19. Patient tested positive for COVID19 2 days prior and was started on paxlovid as outpatient. Of note, family states that the patient has had worsening confusion at home over the past 6 months (becoming disoriented to time) but recently has been more confused, talking about seeing people that are not present.     Surgery consulted on 12/24 for abdominal pain and distension. CTAP obtained which showed  partially occlusive calcified and non-calcified plaque   just distal to take-off of the SMA, with estimated at least 75% luminal narrowing. Limited evaluation of its distal branches. Patient taken emergently to OR on 12/24 with general surgery and vascular surgery. Patient s/p diagnostic laparoscopy and SMA stent placement with brachial cutdown. SICU consulted postoperatively for HD monitoring.       PAST MEDICAL HISTORY: Hyperlipemia    Hypertension    Coronary Artery Disease    Diabetes Mellitus Type II    Stented Coronary Artery    Neuropathy    Myocardial infarction    Stroke    Myoclonic jerking    Stage 3 chronic kidney disease        PAST SURGICAL HISTORY: History of Cataract Extraction    Hx of CABG    H/O coronary angiogram    S/P coronary artery stent placement    S/P placement of cardiac pacemaker      FAMILY HISTORY: No pertinent family history in first degree relatives    ALLERGIES: fluoroquinolone antibiotics (Other)  Cipro (Unknown)  Tegretol (Unknown)  carbamazepine (Other)      VITAL SIGNS:  ICU Vital Signs Last 24 Hrs  T(C): 36.8 (24 Dec 2023 18:30), Max: 36.8 (24 Dec 2023 14:30)  T(F): 98.3 (24 Dec 2023 18:30), Max: 98.3 (24 Dec 2023 18:30)  HR: 86 (24 Dec 2023 18:30) (86 - 91)  BP: 128/67 (24 Dec 2023 18:30) (115/60 - 137/55)  BP(mean): --  ABP: --  ABP(mean): --  RR: 16 (24 Dec 2023 18:30) (16 - 18)  SpO2: 98% (24 Dec 2023 18:30) (97% - 98%)    O2 Parameters below as of 24 Dec 2023 18:30  Patient On (Oxygen Delivery Method): room air      PHYSICAL EXAM:  Gen: NAD  Resp: no increased WOB, satting well on RA  Cardiac: appears well perfused, extremities warm  Abd: soft, distended, incision sites with serosanguinous strikethrough   Extremities: warm, well perfused, L upper arm incision c/d/i, palpable pulses in UEs    LABS:                         11.1   15.35 )-----------( 198      ( 24 Dec 2023 21:38 )             32.1     12-24    134<L>  |  100  |  40<H>  ----------------------------<  248<H>  3.8   |  20<L>  |  1.89<H>    Ca    8.5      24 Dec 2023 21:38  Phos  2.5     12-24  Mg     2.10     12-24    TPro  7.1  /  Alb  3.3  /  TBili  1.0  /  DBili  x   /  AST  45<H>  /  ALT  33  /  AlkPhos  58  12-24    PT/INR - ( 23 Dec 2023 15:49 )   PT: 14.7 sec;   INR: 1.31 ratio         PTT - ( 23 Dec 2023 15:49 )  PTT:31.5 sec  CAPILLARY BLOOD GLUCOSE      POCT Blood Glucose.: 163 mg/dL (24 Dec 2023 12:11)  POCT Blood Glucose.: 190 mg/dL (24 Dec 2023 09:08)        IMAGING STUDIES:  < from: CT Angio Abdomen and Pelvis w/ IV Cont (12.24.23 @ 18:38) >  Partially occlusive calcified and non-calcified plaque   just distal to take-off of the SMA, with estimated at least 75% luminal   narrowing. Limited evaluation of its distal branches. There is mesenteric   fatty stranding seen in region of hepatic flexure. Mild thickening of the   ascending colon. Diffuse athersclerotic disease. Findings may suggest   possible watershed territorial ischemia secondary to distal stenosis   ofthe SMA. Correlation with physical exam, andlab values advised. F/U   official read in AM.     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 Patient states that he has been having a dry cough for the past week, worsened over the past few days. Denies SOB with the cough, denies hemoptysis. Reports fevers at home to 101.5 with associated diaphoresis. Said that he has significant pinprick like b/l chest pain with his cough but denies any chest pain when not coughing or with ambulation. Also reports rhinorrhea and sore throat. Reports generalized weakness and poor appetite. States his daughter was visiting him over the week end last week and she was positive for COVID19. Patient tested positive for COVID19 2 days prior and was started on paxlovid as outpatient. Of note, family states that the patient has had worsening confusion at home over the past 6 months (becoming disoriented to time) but recently has been more confused, talking about seeing people that are not present.     Surgery consulted on 12/24 for abdominal pain and distension. CTAP obtained which showed  partially occlusive calcified and non-calcified plaque   just distal to take-off of the SMA, with estimated at least 75% luminal narrowing. Limited evaluation of its distal branches. Patient taken emergently to OR on 12/24 with general surgery and vascular surgery. Patient s/p diagnostic laparoscopy and SMA stent placement with brachial cutdown. SICU consulted postoperatively for HD monitoring.     Interval events:  -oozing from port sites, applied surgicel and abdominal binder    PAST MEDICAL HISTORY: Hyperlipemia    Hypertension    Coronary Artery Disease    Diabetes Mellitus Type II    Stented Coronary Artery    Neuropathy    Myocardial infarction    Stroke    Myoclonic jerking    Stage 3 chronic kidney disease        PAST SURGICAL HISTORY: History of Cataract Extraction    Hx of CABG    H/O coronary angiogram    S/P coronary artery stent placement    S/P placement of cardiac pacemaker      FAMILY HISTORY: No pertinent family history in first degree relatives    ALLERGIES: fluoroquinolone antibiotics (Other)  Cipro (Unknown)  Tegretol (Unknown)  carbamazepine (Other)      VITAL SIGNS:  ICU Vital Signs Last 24 Hrs  T(C): 36.8 (24 Dec 2023 18:30), Max: 36.8 (24 Dec 2023 14:30)  T(F): 98.3 (24 Dec 2023 18:30), Max: 98.3 (24 Dec 2023 18:30)  HR: 86 (24 Dec 2023 18:30) (86 - 91)  BP: 128/67 (24 Dec 2023 18:30) (115/60 - 137/55)  BP(mean): --  ABP: --  ABP(mean): --  RR: 16 (24 Dec 2023 18:30) (16 - 18)  SpO2: 98% (24 Dec 2023 18:30) (97% - 98%)    O2 Parameters below as of 24 Dec 2023 18:30  Patient On (Oxygen Delivery Method): room air      PHYSICAL EXAM:  Gen: NAD  Resp: no increased WOB, satting well on RA  Cardiac: appears well perfused, extremities warm  Abd: soft, distended, incision sites with serosanguinous strikethrough   Extremities: warm, well perfused, L upper arm incision c/d/i, palpable pulses in UEs    LABS:                         11.1   15.35 )-----------( 198      ( 24 Dec 2023 21:38 )             32.1     12-24    134<L>  |  100  |  40<H>  ----------------------------<  248<H>  3.8   |  20<L>  |  1.89<H>    Ca    8.5      24 Dec 2023 21:38  Phos  2.5     12-24  Mg     2.10     12-24    TPro  7.1  /  Alb  3.3  /  TBili  1.0  /  DBili  x   /  AST  45<H>  /  ALT  33  /  AlkPhos  58  12-24    PT/INR - ( 23 Dec 2023 15:49 )   PT: 14.7 sec;   INR: 1.31 ratio         PTT - ( 23 Dec 2023 15:49 )  PTT:31.5 sec  CAPILLARY BLOOD GLUCOSE      POCT Blood Glucose.: 163 mg/dL (24 Dec 2023 12:11)  POCT Blood Glucose.: 190 mg/dL (24 Dec 2023 09:08)        IMAGING STUDIES:  < from: CT Angio Abdomen and Pelvis w/ IV Cont (12.24.23 @ 18:38) >  Partially occlusive calcified and non-calcified plaque   just distal to take-off of the SMA, with estimated at least 75% luminal   narrowing. Limited evaluation of its distal branches. There is mesenteric   fatty stranding seen in region of hepatic flexure. Mild thickening of the   ascending colon. Diffuse athersclerotic disease. Findings may suggest   possible watershed territorial ischemia secondary to distal stenosis   ofthe SMA. Correlation with physical exam, andlab values advised. F/U   official read in AM.     < end of copied text >

## 2023-12-26 LAB
ANION GAP SERPL CALC-SCNC: 14 MMOL/L — SIGNIFICANT CHANGE UP (ref 7–14)
ANION GAP SERPL CALC-SCNC: 14 MMOL/L — SIGNIFICANT CHANGE UP (ref 7–14)
BUN SERPL-MCNC: 47 MG/DL — HIGH (ref 7–23)
BUN SERPL-MCNC: 47 MG/DL — HIGH (ref 7–23)
CALCIUM SERPL-MCNC: 7.6 MG/DL — LOW (ref 8.4–10.5)
CALCIUM SERPL-MCNC: 7.6 MG/DL — LOW (ref 8.4–10.5)
CHLORIDE SERPL-SCNC: 105 MMOL/L — SIGNIFICANT CHANGE UP (ref 98–107)
CHLORIDE SERPL-SCNC: 105 MMOL/L — SIGNIFICANT CHANGE UP (ref 98–107)
CK MB BLD-MCNC: 2.1 % — SIGNIFICANT CHANGE UP (ref 0–2.5)
CK MB BLD-MCNC: 2.1 % — SIGNIFICANT CHANGE UP (ref 0–2.5)
CK MB CFR SERPL CALC: 2.4 NG/ML — SIGNIFICANT CHANGE UP
CK MB CFR SERPL CALC: 2.4 NG/ML — SIGNIFICANT CHANGE UP
CK SERPL-CCNC: 116 U/L — SIGNIFICANT CHANGE UP (ref 30–200)
CK SERPL-CCNC: 116 U/L — SIGNIFICANT CHANGE UP (ref 30–200)
CO2 SERPL-SCNC: 18 MMOL/L — LOW (ref 22–31)
CO2 SERPL-SCNC: 18 MMOL/L — LOW (ref 22–31)
CREAT SERPL-MCNC: 2.25 MG/DL — HIGH (ref 0.5–1.3)
CREAT SERPL-MCNC: 2.25 MG/DL — HIGH (ref 0.5–1.3)
EGFR: 27 ML/MIN/1.73M2 — LOW
EGFR: 27 ML/MIN/1.73M2 — LOW
GLUCOSE SERPL-MCNC: 120 MG/DL — HIGH (ref 70–99)
GLUCOSE SERPL-MCNC: 120 MG/DL — HIGH (ref 70–99)
HCT VFR BLD CALC: 22.3 % — LOW (ref 39–50)
HCT VFR BLD CALC: 22.3 % — LOW (ref 39–50)
HGB BLD-MCNC: 7.7 G/DL — LOW (ref 13–17)
HGB BLD-MCNC: 7.7 G/DL — LOW (ref 13–17)
MAGNESIUM SERPL-MCNC: 1.9 MG/DL — SIGNIFICANT CHANGE UP (ref 1.6–2.6)
MAGNESIUM SERPL-MCNC: 1.9 MG/DL — SIGNIFICANT CHANGE UP (ref 1.6–2.6)
MCHC RBC-ENTMCNC: 30.1 PG — SIGNIFICANT CHANGE UP (ref 27–34)
MCHC RBC-ENTMCNC: 30.1 PG — SIGNIFICANT CHANGE UP (ref 27–34)
MCHC RBC-ENTMCNC: 34.5 GM/DL — SIGNIFICANT CHANGE UP (ref 32–36)
MCHC RBC-ENTMCNC: 34.5 GM/DL — SIGNIFICANT CHANGE UP (ref 32–36)
MCV RBC AUTO: 87.1 FL — SIGNIFICANT CHANGE UP (ref 80–100)
MCV RBC AUTO: 87.1 FL — SIGNIFICANT CHANGE UP (ref 80–100)
NRBC # BLD: 0 /100 WBCS — SIGNIFICANT CHANGE UP (ref 0–0)
NRBC # BLD: 0 /100 WBCS — SIGNIFICANT CHANGE UP (ref 0–0)
NRBC # FLD: 0 K/UL — SIGNIFICANT CHANGE UP (ref 0–0)
NRBC # FLD: 0 K/UL — SIGNIFICANT CHANGE UP (ref 0–0)
PHOSPHATE SERPL-MCNC: 2.6 MG/DL — SIGNIFICANT CHANGE UP (ref 2.5–4.5)
PHOSPHATE SERPL-MCNC: 2.6 MG/DL — SIGNIFICANT CHANGE UP (ref 2.5–4.5)
PLATELET # BLD AUTO: 184 K/UL — SIGNIFICANT CHANGE UP (ref 150–400)
PLATELET # BLD AUTO: 184 K/UL — SIGNIFICANT CHANGE UP (ref 150–400)
POTASSIUM SERPL-MCNC: 3.3 MMOL/L — LOW (ref 3.5–5.3)
POTASSIUM SERPL-MCNC: 3.3 MMOL/L — LOW (ref 3.5–5.3)
POTASSIUM SERPL-SCNC: 3.3 MMOL/L — LOW (ref 3.5–5.3)
POTASSIUM SERPL-SCNC: 3.3 MMOL/L — LOW (ref 3.5–5.3)
RBC # BLD: 2.56 M/UL — LOW (ref 4.2–5.8)
RBC # BLD: 2.56 M/UL — LOW (ref 4.2–5.8)
RBC # FLD: 14.8 % — HIGH (ref 10.3–14.5)
RBC # FLD: 14.8 % — HIGH (ref 10.3–14.5)
SODIUM SERPL-SCNC: 137 MMOL/L — SIGNIFICANT CHANGE UP (ref 135–145)
SODIUM SERPL-SCNC: 137 MMOL/L — SIGNIFICANT CHANGE UP (ref 135–145)
TROPONIN T, HIGH SENSITIVITY RESULT: 56 NG/L — CRITICAL HIGH
TROPONIN T, HIGH SENSITIVITY RESULT: 56 NG/L — CRITICAL HIGH
WBC # BLD: 11.88 K/UL — HIGH (ref 3.8–10.5)
WBC # BLD: 11.88 K/UL — HIGH (ref 3.8–10.5)
WBC # FLD AUTO: 11.88 K/UL — HIGH (ref 3.8–10.5)
WBC # FLD AUTO: 11.88 K/UL — HIGH (ref 3.8–10.5)

## 2023-12-26 PROCEDURE — 76705 ECHO EXAM OF ABDOMEN: CPT | Mod: 26

## 2023-12-26 PROCEDURE — 99233 SBSQ HOSP IP/OBS HIGH 50: CPT

## 2023-12-26 PROCEDURE — 99232 SBSQ HOSP IP/OBS MODERATE 35: CPT | Mod: 24

## 2023-12-26 PROCEDURE — 93010 ELECTROCARDIOGRAM REPORT: CPT

## 2023-12-26 RX ORDER — TICAGRELOR 90 MG/1
60 TABLET ORAL EVERY 12 HOURS
Refills: 0 | Status: DISCONTINUED | OUTPATIENT
Start: 2023-12-26 | End: 2023-12-26

## 2023-12-26 RX ORDER — POTASSIUM PHOSPHATE, MONOBASIC POTASSIUM PHOSPHATE, DIBASIC 236; 224 MG/ML; MG/ML
30 INJECTION, SOLUTION INTRAVENOUS ONCE
Refills: 0 | Status: COMPLETED | OUTPATIENT
Start: 2023-12-26 | End: 2023-12-26

## 2023-12-26 RX ORDER — TICAGRELOR 90 MG/1
60 TABLET ORAL
Refills: 0 | Status: DISCONTINUED | OUTPATIENT
Start: 2023-12-26 | End: 2023-12-26

## 2023-12-26 RX ORDER — ACETAMINOPHEN 500 MG
1000 TABLET ORAL EVERY 6 HOURS
Refills: 0 | Status: COMPLETED | OUTPATIENT
Start: 2023-12-26 | End: 2023-12-27

## 2023-12-26 RX ORDER — ACETAMINOPHEN 500 MG
1000 TABLET ORAL EVERY 6 HOURS
Refills: 0 | Status: DISCONTINUED | OUTPATIENT
Start: 2023-12-26 | End: 2023-12-26

## 2023-12-26 RX ORDER — SODIUM CHLORIDE 9 MG/ML
1000 INJECTION, SOLUTION INTRAVENOUS
Refills: 0 | Status: DISCONTINUED | OUTPATIENT
Start: 2023-12-26 | End: 2023-12-27

## 2023-12-26 RX ORDER — TICAGRELOR 90 MG/1
60 TABLET ORAL EVERY 12 HOURS
Refills: 0 | Status: DISCONTINUED | OUTPATIENT
Start: 2023-12-26 | End: 2024-01-02

## 2023-12-26 RX ORDER — METOPROLOL TARTRATE 50 MG
2.5 TABLET ORAL EVERY 6 HOURS
Refills: 0 | Status: DISCONTINUED | OUTPATIENT
Start: 2023-12-26 | End: 2023-12-27

## 2023-12-26 RX ORDER — POTASSIUM CHLORIDE 20 MEQ
10 PACKET (EA) ORAL
Refills: 0 | Status: COMPLETED | OUTPATIENT
Start: 2023-12-26 | End: 2023-12-26

## 2023-12-26 RX ADMIN — SODIUM CHLORIDE 100 MILLILITER(S): 9 INJECTION, SOLUTION INTRAVENOUS at 19:43

## 2023-12-26 RX ADMIN — Medication 100 MILLIEQUIVALENT(S): at 06:01

## 2023-12-26 RX ADMIN — HEPARIN SODIUM 5000 UNIT(S): 5000 INJECTION INTRAVENOUS; SUBCUTANEOUS at 06:09

## 2023-12-26 RX ADMIN — Medication 2.5 MILLIGRAM(S): at 23:23

## 2023-12-26 RX ADMIN — Medication 400 MILLIGRAM(S): at 12:20

## 2023-12-26 RX ADMIN — Medication 400 MILLIGRAM(S): at 17:40

## 2023-12-26 RX ADMIN — INSULIN GLARGINE 20 UNIT(S): 100 INJECTION, SOLUTION SUBCUTANEOUS at 22:14

## 2023-12-26 RX ADMIN — PIPERACILLIN AND TAZOBACTAM 25 GRAM(S): 4; .5 INJECTION, POWDER, LYOPHILIZED, FOR SOLUTION INTRAVENOUS at 17:43

## 2023-12-26 RX ADMIN — LEVETIRACETAM 250 MILLIGRAM(S): 250 TABLET, FILM COATED ORAL at 06:09

## 2023-12-26 RX ADMIN — PIPERACILLIN AND TAZOBACTAM 25 GRAM(S): 4; .5 INJECTION, POWDER, LYOPHILIZED, FOR SOLUTION INTRAVENOUS at 06:33

## 2023-12-26 RX ADMIN — HEPARIN SODIUM 5000 UNIT(S): 5000 INJECTION INTRAVENOUS; SUBCUTANEOUS at 15:32

## 2023-12-26 RX ADMIN — Medication 100 MILLIEQUIVALENT(S): at 03:41

## 2023-12-26 RX ADMIN — CHLORHEXIDINE GLUCONATE 1 APPLICATION(S): 213 SOLUTION TOPICAL at 12:20

## 2023-12-26 RX ADMIN — SODIUM CHLORIDE 100 MILLILITER(S): 9 INJECTION, SOLUTION INTRAVENOUS at 22:12

## 2023-12-26 RX ADMIN — TICAGRELOR 60 MILLIGRAM(S): 90 TABLET ORAL at 17:44

## 2023-12-26 RX ADMIN — Medication 2.5 MILLIGRAM(S): at 17:44

## 2023-12-26 RX ADMIN — Medication 300 MILLIGRAM(S): at 12:20

## 2023-12-26 RX ADMIN — LEVETIRACETAM 250 MILLIGRAM(S): 250 TABLET, FILM COATED ORAL at 17:42

## 2023-12-26 RX ADMIN — Medication 400 MILLIGRAM(S): at 06:41

## 2023-12-26 RX ADMIN — POTASSIUM PHOSPHATE, MONOBASIC POTASSIUM PHOSPHATE, DIBASIC 83.33 MILLIMOLE(S): 236; 224 INJECTION, SOLUTION INTRAVENOUS at 06:03

## 2023-12-26 RX ADMIN — HEPARIN SODIUM 5000 UNIT(S): 5000 INJECTION INTRAVENOUS; SUBCUTANEOUS at 22:14

## 2023-12-26 RX ADMIN — Medication 100 MILLIEQUIVALENT(S): at 03:29

## 2023-12-26 RX ADMIN — Medication 400 MILLIGRAM(S): at 23:20

## 2023-12-26 RX ADMIN — Medication 1000 MILLIGRAM(S): at 23:50

## 2023-12-26 RX ADMIN — Medication 1000 MILLIGRAM(S): at 12:42

## 2023-12-26 RX ADMIN — REMDESIVIR 200 MILLIGRAM(S): 5 INJECTION INTRAVENOUS at 03:31

## 2023-12-26 RX ADMIN — Medication 1000 MILLIGRAM(S): at 18:42

## 2023-12-26 NOTE — DIETITIAN INITIAL EVALUATION ADULT - PERTINENT LABORATORY DATA
12-26    137  |  105  |  47<H>  ----------------------------<  120<H>  3.3<L>   |  18<L>  |  2.25<H>    Ca    7.6<L>      26 Dec 2023 01:05  Phos  2.6     12-26  Mg     1.90     12-26    TPro  7.1  /  Alb  3.3  /  TBili  1.0  /  DBili  x   /  AST  45<H>  /  ALT  33  /  AlkPhos  58  12-24  POCT Blood Glucose.: 106 mg/dL (12-26-23 @ 06:00)  A1C with Estimated Average Glucose Result: 9.3 % (12-24-23 @ 06:53)

## 2023-12-26 NOTE — DIETITIAN INITIAL EVALUATION ADULT - NSFNSGIIOFT_GEN_A_CORE
12-25-23 @ 07:01  -  12-26-23 @ 07:00  --------------------------------------------------------  OUT:    Nasogastric/Oral tube (mL): 600 mL  Total OUT: 600 mL    Total NET: -600 mL

## 2023-12-26 NOTE — PROGRESS NOTE ADULT - ASSESSMENT
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 Patient recovering well with bowel function today    Plan  - NPO with NGT, as per general surgery clamp NGT  - LR @ 100  - Lantus and ISS for diabetes  - Continue keppra for history of myoclonic jerks  - Continue home metoprolol (currently not ordered)  - Currently on rectal ASA, holding home brilinta while NGT is placed  - care per SICU    C Team  [id number]

## 2023-12-26 NOTE — PROGRESS NOTE ADULT - ASSESSMENT
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     EKG SR RBBB (old per family     1) CAD s/p CABG  -p/w chest pain. EKG non ischemic , trop -sera   - echo with normal lv and moderate AS   - c/w metoprolol      2) Covid +  - on remdesivir   - c/w supportive management   - t/t per primary team     3) Abd distension   -CTA AP obtained which showed  partially occlusive calcified and non-calcified plaque just distal to take-off of the SMA, with estimated at least 75% luminal narrowing. Limited evaluation of its distal branches. Patient taken emergently to OR on 12/24 s/p diagnostic laparoscopy and SMA stent placement with brachial cutdown  -Surgery/vascular on board , f/u recs

## 2023-12-26 NOTE — PROGRESS NOTE ADULT - ASSESSMENT
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 Surgery consulted on 12/24 for abdominal pain and distension. CTA AP obtained which showed  partially occlusive calcified and non-calcified plaque just distal to take-off of the SMA, with estimated at least 75% luminal narrowing. Limited evaluation of its distal branches. Patient taken emergently to OR on 12/24 with general surgery and vascular surgery. Patient s/p diagnostic laparoscopy and SMA stent placement with brachial cutdown. SICU consulted postoperatively for HD monitoring.     PLAN:   Neurologic:  -A&Ox2 (baseline per family)   -Continue Keppra- history of myoclonic jerks   -Pain: Tylenol  -Exam before pain medications    Respiratory:  -Maintain O2 saturation >92%  -COVID + on remdesivir    Cardiovascular:  -MAP goal >65  -History of HTN- holding home Metoprolol and Ranolazine     Gastrointestinal/Nutrition:  -Diet: NPO  -NGT in place, monitor output     Genitourinary/Renal:  -Moss   -MABLE on CKD (Baseline Cr 1.2-1.3)   -IVF: LR @ 100   -Monitor strict I/Os     Hematologic:  -DVT ppx: SQH   -Continue ASA per rectum   -Resume Brilinta when able to tolerate PO     Infectious Disease:  -Abx: Zosyn/Remdesivir   -Monitor WBC   -Monitor fever curve     Endocrine:  -T2DM   -ISS, Lantus 20U   -Monitor blood glucose     Disposition: SICU

## 2023-12-26 NOTE — PROGRESS NOTE ADULT - SUBJECTIVE AND OBJECTIVE BOX
Morning Surgical Progress Note  Patient is a 89y old  Male who presents with a chief complaint of COVID19, Chest pain (26 Dec 2023 00:07)    SUBJECTIVE: Patient seen and examined at bedside with surgical team, patient without complaints. Had 3 bms overnight    Vital Signs Last 24 Hrs  T(C): 36.2 (26 Dec 2023 04:00), Max: 37.5 (25 Dec 2023 12:00)  T(F): 97.2 (26 Dec 2023 04:00), Max: 99.5 (25 Dec 2023 12:00)  HR: 92 (26 Dec 2023 06:00) (79 - 92)  BP: --  BP(mean): --  RR: 26 (26 Dec 2023 06:00) (12 - 29)  SpO2: 99% (26 Dec 2023 06:00) (95% - 99%)    Parameters below as of 26 Dec 2023 04:00  Patient On (Oxygen Delivery Method): room air    I&O's Detail    25 Dec 2023 07:01  -  26 Dec 2023 07:00  --------------------------------------------------------  IN:    IV PiggyBack: 900 mL    Lactated Ringers: 2400 mL  Total IN: 3300 mL    OUT:    Indwelling Catheter - Urethral (mL): 1070 mL    Nasogastric/Oral tube (mL): 600 mL  Total OUT: 1670 mL    Total NET: 1630 mL        Medications  MEDICATIONS  (STANDING):  acetaminophen   IVPB .. 1000 milliGRAM(s) IV Intermittent every 6 hours  aspirin Suppository 300 milliGRAM(s) Rectal daily  chlorhexidine 2% Cloths 1 Application(s) Topical daily  dextrose 5%. 1000 milliLiter(s) (50 mL/Hr) IV Continuous <Continuous>  dextrose 5%. 1000 milliLiter(s) (100 mL/Hr) IV Continuous <Continuous>  dextrose 50% Injectable 25 Gram(s) IV Push once  dextrose 50% Injectable 25 Gram(s) IV Push once  dextrose 50% Injectable 12.5 Gram(s) IV Push once  glucagon  Injectable 1 milliGRAM(s) IntraMuscular once  heparin   Injectable 5000 Unit(s) SubCutaneous every 8 hours  insulin glargine Injectable (LANTUS) 20 Unit(s) SubCutaneous at bedtime  insulin lispro (ADMELOG) corrective regimen sliding scale   SubCutaneous every 6 hours  lactated ringers. 1000 milliLiter(s) (100 mL/Hr) IV Continuous <Continuous>  levETIRAcetam   Injectable 250 milliGRAM(s) IV Push every 12 hours  piperacillin/tazobactam IVPB.. 3.375 Gram(s) IV Intermittent every 12 hours  remdesivir  IVPB   IV Intermittent   remdesivir  IVPB 100 milliGRAM(s) IV Intermittent every 24 hours    MEDICATIONS  (PRN):  dextrose Oral Gel 15 Gram(s) Oral once PRN Blood Glucose LESS THAN 70 milliGRAM(s)/deciliter    Physical Exam  Constitutional: A&O, NAD  Gastrointestinal: Soft nontender, mildly distended, port sites c/d/i  Extremities: palpable radial pulse left  Skin: No Rashes, Hematoma, Ecchymosis  LABS:                        7.7    11.88 )-----------( 184      ( 26 Dec 2023 01:05 )             22.3     12-26    137  |  105  |  47<H>  ----------------------------<  120<H>  3.3<L>   |  18<L>  |  2.25<H>    Ca    7.6<L>      26 Dec 2023 01:05  Phos  2.6     12-26  Mg     1.90     12-26    TPro  7.1  /  Alb  3.3  /  TBili  1.0  /  DBili  x   /  AST  45<H>  /  ALT  33  /  AlkPhos  58  12-24    PT/INR - ( 25 Dec 2023 12:15 )   PT: 12.9 sec;   INR: 1.15 ratio         PTT - ( 25 Dec 2023 12:15 )  PTT:32.3 sec  LIVER FUNCTIONS - ( 24 Dec 2023 21:38 )  Alb: 3.3 g/dL / Pro: 7.1 g/dL / ALK PHOS: 58 U/L / ALT: 33 U/L / AST: 45 U/L / GGT: x           Urinalysis Basic - ( 26 Dec 2023 01:05 )    Color: x / Appearance: x / SG: x / pH: x  Gluc: 120 mg/dL / Ketone: x  / Bili: x / Urobili: x   Blood: x / Protein: x / Nitrite: x   Leuk Esterase: x / RBC: x / WBC x   Sq Epi: x / Non Sq Epi: x / Bacteria: x

## 2023-12-26 NOTE — PROGRESS NOTE ADULT - SUBJECTIVE AND OBJECTIVE BOX
B TEAM Surgery Progress Note  Patient is a 89y old  Male who presents with a chief complaint of COVID19, Chest pain (26 Dec 2023 15:16)    SUBJECTIVE: POD2 dx lap and SMA stent. Patient seen and examined at bedside with surgical team, patient without complaints.     OBJECTIVE:    Vital Signs Last 24 Hrs  T(C): 36.2 (26 Dec 2023 12:00), Max: 37.2 (25 Dec 2023 16:00)  T(F): 97.1 (26 Dec 2023 12:00), Max: 99 (25 Dec 2023 16:00)  HR: 87 (26 Dec 2023 13:00) (82 - 92)  BP: --  BP(mean): --  RR: 21 (26 Dec 2023 13:00) (17 - 29)  SpO2: 98% (26 Dec 2023 13:00) (95% - 99%)    Parameters below as of 26 Dec 2023 04:00  Patient On (Oxygen Delivery Method): room air    I&O's Detail    25 Dec 2023 07:01  -  26 Dec 2023 07:00  --------------------------------------------------------  IN:    IV PiggyBack: 900 mL    Lactated Ringers: 2400 mL  Total IN: 3300 mL    OUT:    Indwelling Catheter - Urethral (mL): 1070 mL    Nasogastric/Oral tube (mL): 600 mL  Total OUT: 1670 mL    Total NET: 1630 mL      26 Dec 2023 07:01  -  26 Dec 2023 15:41  --------------------------------------------------------  IN:    Lactated Ringers: 700 mL  Total IN: 700 mL    OUT:    Indwelling Catheter - Urethral (mL): 325 mL  Total OUT: 325 mL    Total NET: 375 mL      MEDICATIONS  (STANDING):  acetaminophen   IVPB .. 1000 milliGRAM(s) IV Intermittent every 6 hours  aspirin Suppository 300 milliGRAM(s) Rectal daily  chlorhexidine 2% Cloths 1 Application(s) Topical daily  dextrose 5%. 1000 milliLiter(s) (50 mL/Hr) IV Continuous <Continuous>  dextrose 5%. 1000 milliLiter(s) (100 mL/Hr) IV Continuous <Continuous>  dextrose 50% Injectable 25 Gram(s) IV Push once  dextrose 50% Injectable 12.5 Gram(s) IV Push once  dextrose 50% Injectable 25 Gram(s) IV Push once  glucagon  Injectable 1 milliGRAM(s) IntraMuscular once  heparin   Injectable 5000 Unit(s) SubCutaneous every 8 hours  insulin glargine Injectable (LANTUS) 20 Unit(s) SubCutaneous at bedtime  insulin lispro (ADMELOG) corrective regimen sliding scale   SubCutaneous every 6 hours  lactated ringers. 1000 milliLiter(s) (100 mL/Hr) IV Continuous <Continuous>  levETIRAcetam   Injectable 250 milliGRAM(s) IV Push every 12 hours  metoprolol tartrate Injectable 2.5 milliGRAM(s) IV Push every 6 hours  piperacillin/tazobactam IVPB.. 3.375 Gram(s) IV Intermittent every 12 hours  ticagrelor 60 milliGRAM(s) Oral every 12 hours    MEDICATIONS  (PRN):  dextrose Oral Gel 15 Gram(s) Oral once PRN Blood Glucose LESS THAN 70 milliGRAM(s)/deciliter      PHYSICAL EXAM:  Constitutional: A&O, NAD  Respiratory: Unlabored breathing  Abdomen: Soft nontender, mildly distended, port sites c/d/i    LABS:                        7.7    11.88 )-----------( 184      ( 26 Dec 2023 01:05 )             22.3     12-26    137  |  105  |  47<H>  ----------------------------<  120<H>  3.3<L>   |  18<L>  |  2.25<H>    Ca    7.6<L>      26 Dec 2023 01:05  Phos  2.6     12-26  Mg     1.90     12-26    TPro  5.6<L>  /  Alb  2.6<L>  /  TBili  0.4  /  DBili  0.2  /  AST  30  /  ALT  16  /  AlkPhos  50  12-26    PT/INR - ( 25 Dec 2023 12:15 )   PT: 12.9 sec;   INR: 1.15 ratio         PTT - ( 25 Dec 2023 12:15 )  PTT:32.3 sec  LIVER FUNCTIONS - ( 26 Dec 2023 04:05 )  Alb: 2.6 g/dL / Pro: 5.6 g/dL / ALK PHOS: 50 U/L / ALT: 16 U/L / AST: 30 U/L / GGT: x           Urinalysis Basic - ( 26 Dec 2023 01:05 )    Color: x / Appearance: x / SG: x / pH: x  Gluc: 120 mg/dL / Ketone: x  / Bili: x / Urobili: x   Blood: x / Protein: x / Nitrite: x   Leuk Esterase: x / RBC: x / WBC x   Sq Epi: x / Non Sq Epi: x / Bacteria: x

## 2023-12-26 NOTE — PROGRESS NOTE ADULT - SUBJECTIVE AND OBJECTIVE BOX
NEPHROLOGY     Patient seen and examined.    MEDICATIONS  (STANDING):  acetaminophen   IVPB .. 1000 milliGRAM(s) IV Intermittent every 6 hours  aspirin Suppository 300 milliGRAM(s) Rectal daily  chlorhexidine 2% Cloths 1 Application(s) Topical daily  dextrose 5%. 1000 milliLiter(s) (100 mL/Hr) IV Continuous <Continuous>  dextrose 5%. 1000 milliLiter(s) (50 mL/Hr) IV Continuous <Continuous>  dextrose 50% Injectable 25 Gram(s) IV Push once  dextrose 50% Injectable 25 Gram(s) IV Push once  dextrose 50% Injectable 12.5 Gram(s) IV Push once  glucagon  Injectable 1 milliGRAM(s) IntraMuscular once  heparin   Injectable 5000 Unit(s) SubCutaneous every 8 hours  insulin glargine Injectable (LANTUS) 20 Unit(s) SubCutaneous at bedtime  insulin lispro (ADMELOG) corrective regimen sliding scale   SubCutaneous every 6 hours  lactated ringers. 1000 milliLiter(s) (100 mL/Hr) IV Continuous <Continuous>  levETIRAcetam   Injectable 250 milliGRAM(s) IV Push every 12 hours  piperacillin/tazobactam IVPB.. 3.375 Gram(s) IV Intermittent every 12 hours  remdesivir  IVPB 100 milliGRAM(s) IV Intermittent every 24 hours  remdesivir  IVPB   IV Intermittent     VITALS:  T(C): , Max: 37.5 (12-25-23 @ 12:00)  T(F): , Max: 99.5 (12-25-23 @ 12:00)  HR: 92 (12-26-23 @ 06:00)  BP: --  RR: 26 (12-26-23 @ 06:00)  SpO2: 99% (12-26-23 @ 06:00)    I and O's:    12-25 @ 07:01  -  12-26 @ 07:00  --------------------------------------------------------  IN: 3300 mL / OUT: 1670 mL / NET: 1630 mL    PHYSICAL EXAM:    LABS:                        7.7    11.88 )-----------( 184      ( 26 Dec 2023 01:05 )             22.3     12-26    137  |  105  |  47<H>  ----------------------------<  120<H>  3.3<L>   |  18<L>  |  2.25<H>    Ca    7.6<L>      26 Dec 2023 01:05  Phos  2.6     12-26  Mg     1.90     12-26    TPro  7.1  /  Alb  3.3  /  TBili  1.0  /  DBili  x   /  AST  45<H>  /  ALT  33  /  AlkPhos  58  12-24    RADIOLOGY & ADDITIONAL STUDIES:    ra< from: Xray Chest 1 View- PORTABLE-Urgent (Xray Chest 1 View- PORTABLE-Urgent .) (12.25.23 @ 02:46) >  IMPRESSION:  NG tube with tip in stomach.    Mild pulmonary venous congestion/perihilar interstitial edema, new    --- End of Report ---    ALEJANDRA GARCIA DO; Resident Radiologist  This document has been electronically signed.  SOLO HERNANDEZ DO; Attending Radiologist  This document has been electronically signed. 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     MEDICATIONS  (STANDING):  acetaminophen   IVPB .. 1000 milliGRAM(s) IV Intermittent every 6 hours  aspirin Suppository 300 milliGRAM(s) Rectal daily  chlorhexidine 2% Cloths 1 Application(s) Topical daily  dextrose 5%. 1000 milliLiter(s) (100 mL/Hr) IV Continuous <Continuous>  dextrose 5%. 1000 milliLiter(s) (50 mL/Hr) IV Continuous <Continuous>  dextrose 50% Injectable 25 Gram(s) IV Push once  dextrose 50% Injectable 25 Gram(s) IV Push once  dextrose 50% Injectable 12.5 Gram(s) IV Push once  glucagon  Injectable 1 milliGRAM(s) IntraMuscular once  heparin   Injectable 5000 Unit(s) SubCutaneous every 8 hours  insulin glargine Injectable (LANTUS) 20 Unit(s) SubCutaneous at bedtime  insulin lispro (ADMELOG) corrective regimen sliding scale   SubCutaneous every 6 hours  lactated ringers. 1000 milliLiter(s) (100 mL/Hr) IV Continuous <Continuous>  levETIRAcetam   Injectable 250 milliGRAM(s) IV Push every 12 hours  piperacillin/tazobactam IVPB.. 3.375 Gram(s) IV Intermittent every 12 hours  remdesivir  IVPB 100 milliGRAM(s) IV Intermittent every 24 hours  remdesivir  IVPB   IV Intermittent     VITALS:  T(C): , Max: 37.5 (12-25-23 @ 12:00)  T(F): , Max: 99.5 (12-25-23 @ 12:00)  HR: 92 (12-26-23 @ 06:00)  BP: --  RR: 26 (12-26-23 @ 06:00)  SpO2: 99% (12-26-23 @ 06:00)    I and O's:    12-25 @ 07:01  -  12-26 @ 07:00  --------------------------------------------------------  IN: 3300 mL / OUT: 1670 mL / NET: 1630 mL    PHYSICAL EXAM:  Constitutional: NAD  HEENT: EOMI  Neck:  No JVD, supple   Respiratory: CTA B/L  Cardiovascular: S1 and S2, RRR  Gastrointestinal: + BS   Extremities: No peripheral edema, + peripheral pulses  Neurological: limited   Psychiatric: Normal mood, normal affect  : + Moss  Skin: No rashes, C/D/I    LABS:                        7.7    11.88 )-----------( 184      ( 26 Dec 2023 01:05 )             22.3     12-26    137  |  105  |  47<H>  ----------------------------<  120<H>  3.3<L>   |  18<L>  |  2.25<H>    Ca    7.6<L>      26 Dec 2023 01:05  Phos  2.6     12-26  Mg     1.90     12-26    TPro  7.1  /  Alb  3.3  /  TBili  1.0  /  DBili  x   /  AST  45<H>  /  ALT  33  /  AlkPhos  58  12-24    RADIOLOGY & ADDITIONAL STUDIES:    ra< from: Xray Chest 1 View- PORTABLE-Urgent (Xray Chest 1 View- PORTABLE-Urgent .) (12.25.23 @ 02:46) >  IMPRESSION:  NG tube with tip in stomach.    Mild pulmonary venous congestion/perihilar interstitial edema, new    --- End of Report ---    ALEJANDRA GARCIA DO; Resident Radiologist  This document has been electronically signed.  SOLO HERNANDEZ DO; Attending Radiologist  This document has been electronically signed. 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     MEDICATIONS  (STANDING):  acetaminophen   IVPB .. 1000 milliGRAM(s) IV Intermittent every 6 hours  aspirin Suppository 300 milliGRAM(s) Rectal daily  chlorhexidine 2% Cloths 1 Application(s) Topical daily  dextrose 5%. 1000 milliLiter(s) (100 mL/Hr) IV Continuous <Continuous>  dextrose 5%. 1000 milliLiter(s) (50 mL/Hr) IV Continuous <Continuous>  dextrose 50% Injectable 25 Gram(s) IV Push once  dextrose 50% Injectable 25 Gram(s) IV Push once  dextrose 50% Injectable 12.5 Gram(s) IV Push once  glucagon  Injectable 1 milliGRAM(s) IntraMuscular once  heparin   Injectable 5000 Unit(s) SubCutaneous every 8 hours  insulin glargine Injectable (LANTUS) 20 Unit(s) SubCutaneous at bedtime  insulin lispro (ADMELOG) corrective regimen sliding scale   SubCutaneous every 6 hours  lactated ringers. 1000 milliLiter(s) (100 mL/Hr) IV Continuous <Continuous>  levETIRAcetam   Injectable 250 milliGRAM(s) IV Push every 12 hours  piperacillin/tazobactam IVPB.. 3.375 Gram(s) IV Intermittent every 12 hours  remdesivir  IVPB 100 milliGRAM(s) IV Intermittent every 24 hours  remdesivir  IVPB   IV Intermittent     VITALS:  T(C): , Max: 37.5 (12-25-23 @ 12:00)  T(F): , Max: 99.5 (12-25-23 @ 12:00)  HR: 92 (12-26-23 @ 06:00)  BP: --  RR: 26 (12-26-23 @ 06:00)  SpO2: 99% (12-26-23 @ 06:00)    I and O's:    12-25 @ 07:01  -  12-26 @ 07:00  --------------------------------------------------------  IN: 3300 mL / OUT: 1670 mL / NET: 1630 mL    PHYSICAL EXAM:  Constitutional: NAD  HEENT: EOMI  Neck:  No JVD, supple   Respiratory: CTA B/L  Cardiovascular: S1 and S2, RRR  Gastrointestinal: + BS   Extremities: No peripheral edema, + peripheral pulses  Neurological: limited   Psychiatric: Normal mood, normal affect  : + Moss  Skin: No rashes, C/D/I    LABS:                        7.7    11.88 )-----------( 184      ( 26 Dec 2023 01:05 )             22.3     12-26    137  |  105  |  47<H>  ----------------------------<  120<H>  3.3<L>   |  18<L>  |  2.25<H>    Ca    7.6<L>      26 Dec 2023 01:05  Phos  2.6     12-26  Mg     1.90     12-26    TPro  7.1  /  Alb  3.3  /  TBili  1.0  /  DBili  x   /  AST  45<H>  /  ALT  33  /  AlkPhos  58  12-24    RADIOLOGY & ADDITIONAL STUDIES:    ra< from: Xray Chest 1 View- PORTABLE-Urgent (Xray Chest 1 View- PORTABLE-Urgent .) (12.25.23 @ 02:46) >  IMPRESSION:  NG tube with tip in stomach.    Mild pulmonary venous congestion/perihilar interstitial edema, new    --- End of Report ---    ALEJANDRA GARCIA DO; Resident Radiologist  This document has been electronically signed.  SOLO HERNANDEZ DO; Attending Radiologist  This document has been electronically signed. 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     MEDICATIONS  (STANDING):  acetaminophen   IVPB .. 1000 milliGRAM(s) IV Intermittent every 6 hours  aspirin Suppository 300 milliGRAM(s) Rectal daily  chlorhexidine 2% Cloths 1 Application(s) Topical daily  dextrose 5%. 1000 milliLiter(s) (100 mL/Hr) IV Continuous <Continuous>  dextrose 5%. 1000 milliLiter(s) (50 mL/Hr) IV Continuous <Continuous>  dextrose 50% Injectable 25 Gram(s) IV Push once  dextrose 50% Injectable 25 Gram(s) IV Push once  dextrose 50% Injectable 12.5 Gram(s) IV Push once  glucagon  Injectable 1 milliGRAM(s) IntraMuscular once  heparin   Injectable 5000 Unit(s) SubCutaneous every 8 hours  insulin glargine Injectable (LANTUS) 20 Unit(s) SubCutaneous at bedtime  insulin lispro (ADMELOG) corrective regimen sliding scale   SubCutaneous every 6 hours  lactated ringers. 1000 milliLiter(s) (100 mL/Hr) IV Continuous <Continuous>  levETIRAcetam   Injectable 250 milliGRAM(s) IV Push every 12 hours  piperacillin/tazobactam IVPB.. 3.375 Gram(s) IV Intermittent every 12 hours  remdesivir  IVPB 100 milliGRAM(s) IV Intermittent every 24 hours  remdesivir  IVPB   IV Intermittent     VITALS:  T(C): , Max: 37.5 (12-25-23 @ 12:00)  T(F): , Max: 99.5 (12-25-23 @ 12:00)  HR: 92 (12-26-23 @ 06:00)  BP: --  RR: 26 (12-26-23 @ 06:00)  SpO2: 99% (12-26-23 @ 06:00)    I and O's:    12-25 @ 07:01  -  12-26 @ 07:00  --------------------------------------------------------  IN: 3300 mL / OUT: 1670 mL / NET: 1630 mL    PHYSICAL EXAM:  Constitutional: NAD  HEENT: EOMI  Neck:  No JVD, supple   Respiratory: CTA B/L  Cardiovascular: S1 and S2, RRR  Gastrointestinal: + BS   Extremities: No peripheral edema, + peripheral pulses  Neurological: limited   Psychiatric: Normal mood, normal affect  : + Moss  Skin: No rashes, C/D/I    LABS:                        7.7    11.88 )-----------( 184      ( 26 Dec 2023 01:05 )             22.3     12-26    137  |  105  |  47<H>  ----------------------------<  120<H>  3.3<L>   |  18<L>  |  2.25<H>    Ca    7.6<L>      26 Dec 2023 01:05  Phos  2.6     12-26  Mg     1.90     12-26    TPro  7.1  /  Alb  3.3  /  TBili  1.0  /  DBili  x   /  AST  45<H>  /  ALT  33  /  AlkPhos  58  12-24    RADIOLOGY & ADDITIONAL STUDIES:    ra< from: Xray Chest 1 View- PORTABLE-Urgent (Xray Chest 1 View- PORTABLE-Urgent .) (12.25.23 @ 02:46) >  IMPRESSION:  NG tube with tip in stomach.    Mild pulmonary venous congestion/perihilar interstitial edema, new    --- End of Report ---    ALEJANDRA GARCIA DO; Resident Radiologist  This document has been electronically signed.  SOLO HERNANDEZ DO; Attending Radiologist  This document has been electronically signed. 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 Cordelia Cummins, [facility]  [phone]    NEPHROLOGY     Patient seen and examined with son at bedside, pt resting comfortably on room air, in no acute distress.     MEDICATIONS  (STANDING):  acetaminophen   IVPB .. 1000 milliGRAM(s) IV Intermittent every 6 hours  aspirin Suppository 300 milliGRAM(s) Rectal daily  chlorhexidine 2% Cloths 1 Application(s) Topical daily  dextrose 5%. 1000 milliLiter(s) (100 mL/Hr) IV Continuous <Continuous>  dextrose 5%. 1000 milliLiter(s) (50 mL/Hr) IV Continuous <Continuous>  dextrose 50% Injectable 25 Gram(s) IV Push once  dextrose 50% Injectable 25 Gram(s) IV Push once  dextrose 50% Injectable 12.5 Gram(s) IV Push once  glucagon  Injectable 1 milliGRAM(s) IntraMuscular once  heparin   Injectable 5000 Unit(s) SubCutaneous every 8 hours  insulin glargine Injectable (LANTUS) 20 Unit(s) SubCutaneous at bedtime  insulin lispro (ADMELOG) corrective regimen sliding scale   SubCutaneous every 6 hours  lactated ringers. 1000 milliLiter(s) (100 mL/Hr) IV Continuous <Continuous>  levETIRAcetam   Injectable 250 milliGRAM(s) IV Push every 12 hours  piperacillin/tazobactam IVPB.. 3.375 Gram(s) IV Intermittent every 12 hours  remdesivir  IVPB 100 milliGRAM(s) IV Intermittent every 24 hours  remdesivir  IVPB   IV Intermittent     VITALS:  T(C): , Max: 37.5 (12-25-23 @ 12:00)  T(F): , Max: 99.5 (12-25-23 @ 12:00)  HR: 92 (12-26-23 @ 06:00)  BP: --  RR: 26 (12-26-23 @ 06:00)  SpO2: 99% (12-26-23 @ 06:00)    I and O's:    12-25 @ 07:01  -  12-26 @ 07:00  --------------------------------------------------------  IN: 3300 mL / OUT: 1670 mL / NET: 1630 mL    PHYSICAL EXAM:  Constitutional: NAD  HEENT: EOMI  Neck:  No JVD, supple   Respiratory: CTA B/L  Cardiovascular: S1 and S2, RRR  Gastrointestinal: + BS   Extremities: No peripheral edema, + peripheral pulses  Neurological: limited   Psychiatric: Normal mood, normal affect  : + Moss  Skin: No rashes, C/D/I    LABS:                        7.7    11.88 )-----------( 184      ( 26 Dec 2023 01:05 )             22.3     12-26    137  |  105  |  47<H>  ----------------------------<  120<H>  3.3<L>   |  18<L>  |  2.25<H>    Ca    7.6<L>      26 Dec 2023 01:05  Phos  2.6     12-26  Mg     1.90     12-26    TPro  7.1  /  Alb  3.3  /  TBili  1.0  /  DBili  x   /  AST  45<H>  /  ALT  33  /  AlkPhos  58  12-24    RADIOLOGY & ADDITIONAL STUDIES:    ra< from: Xray Chest 1 View- PORTABLE-Urgent (Xray Chest 1 View- PORTABLE-Urgent .) (12.25.23 @ 02:46) >  IMPRESSION:  NG tube with tip in stomach.    Mild pulmonary venous congestion/perihilar interstitial edema, new    --- End of Report ---    ALEJANDRA GARCIA DO; Resident Radiologist  This document has been electronically signed.  SOLO HERNANDEZ DO; Attending Radiologist  This document has been electronically signed. 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 Cordelia Cummins, [facility]  [phone]

## 2023-12-26 NOTE — DIETITIAN INITIAL EVALUATION ADULT - PERTINENT MEDS FT
MEDICATIONS  (STANDING):  acetaminophen   IVPB .. 1000 milliGRAM(s) IV Intermittent every 6 hours  aspirin Suppository 300 milliGRAM(s) Rectal daily  chlorhexidine 2% Cloths 1 Application(s) Topical daily  dextrose 5%. 1000 milliLiter(s) (100 mL/Hr) IV Continuous <Continuous>  dextrose 5%. 1000 milliLiter(s) (50 mL/Hr) IV Continuous <Continuous>  dextrose 50% Injectable 25 Gram(s) IV Push once  dextrose 50% Injectable 25 Gram(s) IV Push once  dextrose 50% Injectable 12.5 Gram(s) IV Push once  glucagon  Injectable 1 milliGRAM(s) IntraMuscular once  heparin   Injectable 5000 Unit(s) SubCutaneous every 8 hours  insulin glargine Injectable (LANTUS) 20 Unit(s) SubCutaneous at bedtime  insulin lispro (ADMELOG) corrective regimen sliding scale   SubCutaneous every 6 hours  lactated ringers. 1000 milliLiter(s) (100 mL/Hr) IV Continuous <Continuous>  levETIRAcetam   Injectable 250 milliGRAM(s) IV Push every 12 hours  piperacillin/tazobactam IVPB.. 3.375 Gram(s) IV Intermittent every 12 hours  remdesivir  IVPB   IV Intermittent   remdesivir  IVPB 100 milliGRAM(s) IV Intermittent every 24 hours    MEDICATIONS  (PRN):  dextrose Oral Gel 15 Gram(s) Oral once PRN Blood Glucose LESS THAN 70 milliGRAM(s)/deciliter

## 2023-12-26 NOTE — DIETITIAN INITIAL EVALUATION ADULT - NAME AND PHONE
Amisha Driscoll, MS, RDN, CDN, CNSC on [facility] TEAMS or Pager #[phone]  Amisha Driscoll, MS, RDN, CDN, CNSC on [employer] TEAMS or Pager #[phone]

## 2023-12-26 NOTE — PROGRESS NOTE ADULT - ATTENDING COMMENTS
I have personally interviewed and examined this patient, reviewed pertinent labs and imaging, and discussed the case with colleagues, residents, and physician assistants on B Team rounds.  See adjacent note by PA/resident.    Chief complaint:  COVID +, abd pain    POD#2 s/p diagnostic laparoscopy and SMA stent    ICU Vital Signs Last 24 Hrs  T(C): 36.2 (26 Dec 2023 04:00), Max: 37.5 (25 Dec 2023 12:00)  T(F): 97.2 (26 Dec 2023 04:00), Max: 99.5 (25 Dec 2023 12:00)  HR: 92 (26 Dec 2023 06:00) (79 - 92)  BP: --  BP(mean): --  ABP: 159/53 (26 Dec 2023 06:00) (120/40 - 170/57)  ABP(mean): 94 (26 Dec 2023 06:00) (68 - 101)  RR: 26 (26 Dec 2023 06:00) (12 - 29)  SpO2: 99% (26 Dec 2023 06:00) (95% - 99%)    O2 Parameters below as of 26 Dec 2023 04:00  Patient On (Oxygen Delivery Method): room air                          7.7    11.88 )-----------( 184      ( 26 Dec 2023 01:05 )             22.3   12-26    137  |  105  |  47<H>  ----------------------------<  120<H>  3.3<L>   |  18<L>  |  2.25<H>    Ca    7.6<L>      26 Dec 2023 01:05  Phos  2.6     12-26  Mg     1.90     12-26    TPro  7.1  /  Alb  3.3  /  TBili  1.0  /  DBili  x   /  AST  45<H>  /  ALT  33  /  AlkPhos  58  12-24  MEDICATIONS  (STANDING):  acetaminophen   IVPB .. 1000 milliGRAM(s) IV Intermittent every 6 hours  aspirin Suppository 300 milliGRAM(s) Rectal daily  chlorhexidine 2% Cloths 1 Application(s) Topical daily  dextrose 5%. 1000 milliLiter(s) (100 mL/Hr) IV Continuous <Continuous>  dextrose 5%. 1000 milliLiter(s) (50 mL/Hr) IV Continuous <Continuous>  dextrose 50% Injectable 25 Gram(s) IV Push once  dextrose 50% Injectable 25 Gram(s) IV Push once  dextrose 50% Injectable 12.5 Gram(s) IV Push once  glucagon  Injectable 1 milliGRAM(s) IntraMuscular once  heparin   Injectable 5000 Unit(s) SubCutaneous every 8 hours  insulin glargine Injectable (LANTUS) 20 Unit(s) SubCutaneous at bedtime  insulin lispro (ADMELOG) corrective regimen sliding scale   SubCutaneous every 6 hours  lactated ringers. 1000 milliLiter(s) (100 mL/Hr) IV Continuous <Continuous>  levETIRAcetam   Injectable 250 milliGRAM(s) IV Push every 12 hours  piperacillin/tazobactam IVPB.. 3.375 Gram(s) IV Intermittent every 12 hours  remdesivir  IVPB   IV Intermittent   remdesivir  IVPB 100 milliGRAM(s) IV Intermittent every 24 hours      The active care issues are:  1. mild colitis  2. possibly cholecystitis    Consider RUQ US if ongoing concerns for cholecytitis despite iv antibiotics.  Favor perc asa tube decompression if needed.  Overall, however, aside from MABLE, he appears to be recovering.    The Acute Care Surgery (B Team) Practice:    urgent issues - spectra [phone]  nonurgent issues - [phone]  patient appointments or afterhours - [phone] I have personally interviewed and examined this patient, reviewed pertinent labs and imaging, and discussed the case with colleagues, residents, and physician assistants on B Team rounds.  See adjacent note by PA/resident.    Chief complaint:  COVID +, abd pain    POD#2 s/p diagnostic laparoscopy and SMA stent    ICU Vital Signs Last 24 Hrs  T(C): 36.2 (26 Dec 2023 04:00), Max: 37.5 (25 Dec 2023 12:00)  T(F): 97.2 (26 Dec 2023 04:00), Max: 99.5 (25 Dec 2023 12:00)  HR: 92 (26 Dec 2023 06:00) (79 - 92)  BP: --  BP(mean): --  ABP: 159/53 (26 Dec 2023 06:00) (120/40 - 170/57)  ABP(mean): 94 (26 Dec 2023 06:00) (68 - 101)  RR: 26 (26 Dec 2023 06:00) (12 - 29)  SpO2: 99% (26 Dec 2023 06:00) (95% - 99%)    O2 Parameters below as of 26 Dec 2023 04:00  Patient On (Oxygen Delivery Method): room air                          7.7    11.88 )-----------( 184      ( 26 Dec 2023 01:05 )             22.3   12-26    137  |  105  |  47<H>  ----------------------------<  120<H>  3.3<L>   |  18<L>  |  2.25<H>    Ca    7.6<L>      26 Dec 2023 01:05  Phos  2.6     12-26  Mg     1.90     12-26    TPro  7.1  /  Alb  3.3  /  TBili  1.0  /  DBili  x   /  AST  45<H>  /  ALT  33  /  AlkPhos  58  12-24  MEDICATIONS  (STANDING):  acetaminophen   IVPB .. 1000 milliGRAM(s) IV Intermittent every 6 hours  aspirin Suppository 300 milliGRAM(s) Rectal daily  chlorhexidine 2% Cloths 1 Application(s) Topical daily  dextrose 5%. 1000 milliLiter(s) (100 mL/Hr) IV Continuous <Continuous>  dextrose 5%. 1000 milliLiter(s) (50 mL/Hr) IV Continuous <Continuous>  dextrose 50% Injectable 25 Gram(s) IV Push once  dextrose 50% Injectable 25 Gram(s) IV Push once  dextrose 50% Injectable 12.5 Gram(s) IV Push once  glucagon  Injectable 1 milliGRAM(s) IntraMuscular once  heparin   Injectable 5000 Unit(s) SubCutaneous every 8 hours  insulin glargine Injectable (LANTUS) 20 Unit(s) SubCutaneous at bedtime  insulin lispro (ADMELOG) corrective regimen sliding scale   SubCutaneous every 6 hours  lactated ringers. 1000 milliLiter(s) (100 mL/Hr) IV Continuous <Continuous>  levETIRAcetam   Injectable 250 milliGRAM(s) IV Push every 12 hours  piperacillin/tazobactam IVPB.. 3.375 Gram(s) IV Intermittent every 12 hours  remdesivir  IVPB   IV Intermittent   remdesivir  IVPB 100 milliGRAM(s) IV Intermittent every 24 hours      The active care issues are:  1. mild colitis  2. possibly cholecystitis    Consider RUQ US if ongoing concerns for cholecytitis despite iv antibiotics.  Favor perc asa tube decompression if needed.  Overall, however, aside from MABLE, he appears to be recovering.    The Acute Care Surgery (B Team) Practice:    urgent issues - spectra [phone]  nonurgent issues - [phone]  patient appointments or afterhours - [phone] I have personally interviewed and examined this patient, reviewed pertinent labs and imaging, and discussed the case with colleagues, residents, and physician assistants on B Team rounds.  See adjacent note by PA/resident.    Chief complaint:  COVID +, abd pain    POD#2 s/p diagnostic laparoscopy and SMA stent    ICU Vital Signs Last 24 Hrs  T(C): 36.2 (26 Dec 2023 04:00), Max: 37.5 (25 Dec 2023 12:00)  T(F): 97.2 (26 Dec 2023 04:00), Max: 99.5 (25 Dec 2023 12:00)  HR: 92 (26 Dec 2023 06:00) (79 - 92)  BP: --  BP(mean): --  ABP: 159/53 (26 Dec 2023 06:00) (120/40 - 170/57)  ABP(mean): 94 (26 Dec 2023 06:00) (68 - 101)  RR: 26 (26 Dec 2023 06:00) (12 - 29)  SpO2: 99% (26 Dec 2023 06:00) (95% - 99%)    O2 Parameters below as of 26 Dec 2023 04:00  Patient On (Oxygen Delivery Method): room air    RUQ mildly tender to palpation  incisions intact                        7.7    11.88 )-----------( 184      ( 26 Dec 2023 01:05 )             22.3   12-26    137  |  105  |  47<H>  ----------------------------<  120<H>  3.3<L>   |  18<L>  |  2.25<H>    Ca    7.6<L>      26 Dec 2023 01:05  Phos  2.6     12-26  Mg     1.90     12-26    TPro  7.1  /  Alb  3.3  /  TBili  1.0  /  DBili  x   /  AST  45<H>  /  ALT  33  /  AlkPhos  58  12-24  MEDICATIONS  (STANDING):  acetaminophen   IVPB .. 1000 milliGRAM(s) IV Intermittent every 6 hours  aspirin Suppository 300 milliGRAM(s) Rectal daily  chlorhexidine 2% Cloths 1 Application(s) Topical daily  dextrose 5%. 1000 milliLiter(s) (100 mL/Hr) IV Continuous <Continuous>  dextrose 5%. 1000 milliLiter(s) (50 mL/Hr) IV Continuous <Continuous>  dextrose 50% Injectable 25 Gram(s) IV Push once  dextrose 50% Injectable 25 Gram(s) IV Push once  dextrose 50% Injectable 12.5 Gram(s) IV Push once  glucagon  Injectable 1 milliGRAM(s) IntraMuscular once  heparin   Injectable 5000 Unit(s) SubCutaneous every 8 hours  insulin glargine Injectable (LANTUS) 20 Unit(s) SubCutaneous at bedtime  insulin lispro (ADMELOG) corrective regimen sliding scale   SubCutaneous every 6 hours  lactated ringers. 1000 milliLiter(s) (100 mL/Hr) IV Continuous <Continuous>  levETIRAcetam   Injectable 250 milliGRAM(s) IV Push every 12 hours  piperacillin/tazobactam IVPB.. 3.375 Gram(s) IV Intermittent every 12 hours  remdesivir  IVPB   IV Intermittent   remdesivir  IVPB 100 milliGRAM(s) IV Intermittent every 24 hours      The active care issues are:  1. mild colitis  2. possibly cholecystitis    Consider RUQ US if ongoing concerns for cholecytitis despite iv antibiotics.  Favor perc asa tube decompression if needed.  Overall, however, aside from MABLE, he appears to be recovering.    The Acute Care Surgery (B Team) Practice:    urgent issues - spectra [phone]  nonurgent issues - [phone]  patient appointments or afterhours - [phone] I have personally interviewed and examined this patient, reviewed pertinent labs and imaging, and discussed the case with colleagues, residents, and physician assistants on B Team rounds.  See adjacent note by PA/resident.    Chief complaint:  COVID +, abd pain    POD#2 s/p diagnostic laparoscopy and SMA stent    ICU Vital Signs Last 24 Hrs  T(C): 36.2 (26 Dec 2023 04:00), Max: 37.5 (25 Dec 2023 12:00)  T(F): 97.2 (26 Dec 2023 04:00), Max: 99.5 (25 Dec 2023 12:00)  HR: 92 (26 Dec 2023 06:00) (79 - 92)  BP: --  BP(mean): --  ABP: 159/53 (26 Dec 2023 06:00) (120/40 - 170/57)  ABP(mean): 94 (26 Dec 2023 06:00) (68 - 101)  RR: 26 (26 Dec 2023 06:00) (12 - 29)  SpO2: 99% (26 Dec 2023 06:00) (95% - 99%)    O2 Parameters below as of 26 Dec 2023 04:00  Patient On (Oxygen Delivery Method): room air    RUQ mildly tender to palpation  incisions intact                        7.7    11.88 )-----------( 184      ( 26 Dec 2023 01:05 )             22.3   12-26    137  |  105  |  47<H>  ----------------------------<  120<H>  3.3<L>   |  18<L>  |  2.25<H>    Ca    7.6<L>      26 Dec 2023 01:05  Phos  2.6     12-26  Mg     1.90     12-26    TPro  7.1  /  Alb  3.3  /  TBili  1.0  /  DBili  x   /  AST  45<H>  /  ALT  33  /  AlkPhos  58  12-24  MEDICATIONS  (STANDING):  acetaminophen   IVPB .. 1000 milliGRAM(s) IV Intermittent every 6 hours  aspirin Suppository 300 milliGRAM(s) Rectal daily  chlorhexidine 2% Cloths 1 Application(s) Topical daily  dextrose 5%. 1000 milliLiter(s) (100 mL/Hr) IV Continuous <Continuous>  dextrose 5%. 1000 milliLiter(s) (50 mL/Hr) IV Continuous <Continuous>  dextrose 50% Injectable 25 Gram(s) IV Push once  dextrose 50% Injectable 25 Gram(s) IV Push once  dextrose 50% Injectable 12.5 Gram(s) IV Push once  glucagon  Injectable 1 milliGRAM(s) IntraMuscular once  heparin   Injectable 5000 Unit(s) SubCutaneous every 8 hours  insulin glargine Injectable (LANTUS) 20 Unit(s) SubCutaneous at bedtime  insulin lispro (ADMELOG) corrective regimen sliding scale   SubCutaneous every 6 hours  lactated ringers. 1000 milliLiter(s) (100 mL/Hr) IV Continuous <Continuous>  levETIRAcetam   Injectable 250 milliGRAM(s) IV Push every 12 hours  piperacillin/tazobactam IVPB.. 3.375 Gram(s) IV Intermittent every 12 hours  remdesivir  IVPB   IV Intermittent   remdesivir  IVPB 100 milliGRAM(s) IV Intermittent every 24 hours      The active care issues are:  1. mild colitis  2. possibly cholecystitis    Consider RUQ US if ongoing concerns for cholecytitis despite iv antibiotics.  Favor perc asa tube decompression if needed.  Overall, however, aside from MABLE, he appears to be recovering.    The Acute Care Surgery (B Team) Practice:    urgent issues - spectra [phone]  nonurgent issues - [phone]  patient appointments or afterhours - [phone]

## 2023-12-26 NOTE — PROGRESS NOTE ADULT - SUBJECTIVE AND OBJECTIVE BOX
Date of Service  : 12-26-23     INTERVAL HPI/OVERNIGHT EVENTS: I feel better.   Vital Signs Last 24 Hrs  T(C): 36.2 (26 Dec 2023 12:00), Max: 37.2 (25 Dec 2023 16:00)  T(F): 97.1 (26 Dec 2023 12:00), Max: 99 (25 Dec 2023 16:00)  HR: 87 (26 Dec 2023 13:00) (82 - 92)  BP: --  BP(mean): --  RR: 21 (26 Dec 2023 13:00) (17 - 29)  SpO2: 98% (26 Dec 2023 13:00) (95% - 99%)    Parameters below as of 26 Dec 2023 04:00  Patient On (Oxygen Delivery Method): room air      I&O's Summary    25 Dec 2023 07:01  -  26 Dec 2023 07:00  --------------------------------------------------------  IN: 3300 mL / OUT: 1670 mL / NET: 1630 mL    26 Dec 2023 07:01  -  26 Dec 2023 15:17  --------------------------------------------------------  IN: 700 mL / OUT: 325 mL / NET: 375 mL      MEDICATIONS  (STANDING):  acetaminophen   IVPB .. 1000 milliGRAM(s) IV Intermittent every 6 hours  aspirin Suppository 300 milliGRAM(s) Rectal daily  chlorhexidine 2% Cloths 1 Application(s) Topical daily  dextrose 5%. 1000 milliLiter(s) (100 mL/Hr) IV Continuous <Continuous>  dextrose 5%. 1000 milliLiter(s) (50 mL/Hr) IV Continuous <Continuous>  dextrose 50% Injectable 25 Gram(s) IV Push once  dextrose 50% Injectable 25 Gram(s) IV Push once  dextrose 50% Injectable 12.5 Gram(s) IV Push once  glucagon  Injectable 1 milliGRAM(s) IntraMuscular once  heparin   Injectable 5000 Unit(s) SubCutaneous every 8 hours  insulin glargine Injectable (LANTUS) 20 Unit(s) SubCutaneous at bedtime  insulin lispro (ADMELOG) corrective regimen sliding scale   SubCutaneous every 6 hours  lactated ringers. 1000 milliLiter(s) (100 mL/Hr) IV Continuous <Continuous>  levETIRAcetam   Injectable 250 milliGRAM(s) IV Push every 12 hours  metoprolol tartrate Injectable 2.5 milliGRAM(s) IV Push every 6 hours  piperacillin/tazobactam IVPB.. 3.375 Gram(s) IV Intermittent every 12 hours  ticagrelor 60 milliGRAM(s) Oral every 12 hours    MEDICATIONS  (PRN):  dextrose Oral Gel 15 Gram(s) Oral once PRN Blood Glucose LESS THAN 70 milliGRAM(s)/deciliter    LABS:                        7.7    11.88 )-----------( 184      ( 26 Dec 2023 01:05 )             22.3     12-26    137  |  105  |  47<H>  ----------------------------<  120<H>  3.3<L>   |  18<L>  |  2.25<H>    Ca    7.6<L>      26 Dec 2023 01:05  Phos  2.6     12-26  Mg     1.90     12-26    TPro  5.6<L>  /  Alb  2.6<L>  /  TBili  0.4  /  DBili  0.2  /  AST  30  /  ALT  16  /  AlkPhos  50  12-26    PT/INR - ( 25 Dec 2023 12:15 )   PT: 12.9 sec;   INR: 1.15 ratio         PTT - ( 25 Dec 2023 12:15 )  PTT:32.3 sec  Urinalysis Basic - ( 26 Dec 2023 01:05 )    Color: x / Appearance: x / SG: x / pH: x  Gluc: 120 mg/dL / Ketone: x  / Bili: x / Urobili: x   Blood: x / Protein: x / Nitrite: x   Leuk Esterase: x / RBC: x / WBC x   Sq Epi: x / Non Sq Epi: x / Bacteria: x      CAPILLARY BLOOD GLUCOSE      POCT Blood Glucose.: 103 mg/dL (26 Dec 2023 12:24)  POCT Blood Glucose.: 106 mg/dL (26 Dec 2023 06:00)  POCT Blood Glucose.: 113 mg/dL (25 Dec 2023 23:01)  POCT Blood Glucose.: 124 mg/dL (25 Dec 2023 17:20)      ABG - ( 25 Dec 2023 02:29 )  pH, Arterial: 7.35  pH, Blood: x     /  pCO2: 36    /  pO2: 76    / HCO3: 20    / Base Excess: -5.2  /  SaO2: 95.0              Urinalysis Basic - ( 26 Dec 2023 01:05 )    Color: x / Appearance: x / SG: x / pH: x  Gluc: 120 mg/dL / Ketone: x  / Bili: x / Urobili: x   Blood: x / Protein: x / Nitrite: x   Leuk Esterase: x / RBC: x / WBC x   Sq Epi: x / Non Sq Epi: x / Bacteria: x          RADIOLOGY & ADDITIONAL TESTS:    Consultant(s) Notes Reviewed:  [x ] YES  [ ] NO    PHYSICAL EXAM:  GENERAL: Not in any distress ,  HEAD:  Atraumatic, Normocephalic  NECK: Supple, No JVD, Normal thyroid  NERVOUS SYSTEM:  Alert & Oriented X3, No focal deficit   CHEST/LUNG: Good air entry bilateral with no  rales, rhonchi, wheezing, or rubs  HEART: Regular rate and rhythm; No murmurs, rubs, or gallops  ABDOMEN: Soft, Nontender, Nondistended; Bowel sounds present  EXTREMITIES:  2+ Peripheral Pulses, No clubbing, cyanosis, or edema    Care Discussed with Consultants/Other Providers [ x] YES  [ ] NO

## 2023-12-26 NOTE — PROGRESS NOTE ADULT - ASSESSMENT
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       Problem/Plan - 1:  ·  Problem: 2019 novel coronavirus disease (COVID-19).   ·  Plan: - Patient COVID19 positive with complaints of dry cough, URI symptoms, fevers to 101.5 at home, generalized malaise with poor oral intake, and diarrhea  - Here COVID19 positive, CXR with bibasilar atelectasis but no consolidation, saturating well on RA  - Given MABLE will hold off on remdesivir for now, no current indication for dexamethasone as patient saturating well on RA  - Monitor COVID19 inflammatory labs  - Monitor respiratory status, monitor fever curve  - Has bibasilar crackles on.     Problem/Plan - 2:  ·  Problem: Sepsis, unspecified organism.  ·  Plan: - Meets sepsis criteria with leukocytosis, elevated respiratory rate, COVID19+ swap  - Lactate mildly elevated, s/p 1L in ED -> repeat lactate in AM  - No complaints of  issues -> will hold off on UCx for now  - Given bandemia would check BCx x2  - Diarrhea also likely 2/2 COVID19, monitor for additional episodes  - Trend leukocytosis/bandemia, monitor fever curve, monitor respiratory rate.     Problem/Plan - 3:  ·  Problem: Acute chest pain.  ·  Plan: - New onset chest pain, bilateral, pinprick sensation, only with coughing no chest pain when not coughing, no chest pain with exertion  - Patient denies dizziness, palpitations, syncope  - Troponin here indeterminant but stable compared to prior levels  - EKG non-ischemic  - Given history of CAD with recent stent in May 2022 will monitor on telemetry for now,   - Cards help appreciated.   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     Problem/Plan - 4:  ·  Problem: Acute renal failure superimposed on chronic kidney disease.  ·  Plan: - Baseline creatinine 1.2 - 1.3 as per family  - Currently has Cr 1.99 -> 1.77  - Likely 2/2 sepsis 2/2 COVID 19 with increased insensible losses and poor oral intake  - Will place on additional LR at 100 cc/hr fr 10 hrs  - Check UA, check urine sodium/creatinine ratio  - Trend BUN/Cr  - Condom catheter to allow for I/O monitoring  - Renal help appreciated.      Problem/Plan - 5:  ·  Problem: Toxic metabolic encephalopathy.  ·  Plan: - Family reports patient with worsening memory over the past ~6 months, now worsened acutely over the past few days  - Here on examination is AAO x2 (to self and place, not to time) which seems to be his new baseline over the past 6  months  - Likely has worsening confusion 2/2 age and sepsis with COVID19  - Likely outpatient follow up with his PCP for evaluation of his worsening memory issues.     Problem/Plan - 6:  ·  Problem: Type 2 diabetes mellitus with hyperglycemia.  ·  Plan: - Restart home insulin therapy but at decreased dosing -> lantus 18U qHS and admelog 2U qAC  - Monitor FS, low dose ISS qAC, CC diet.     Problem/Plan - 7:  ·  Problem: CAD (coronary artery disease).  ·  Plan: - c/w aspirin and brilinta  - Chest pain work up as above.     Problem/Plan - 8:  ·  Problem: Essential hypertension.  ·  Plan: - c/w metoprolol.     Problem/Plan - 9:  ·  Problem: Hyperlipidemia.  ·  Plan: - c/w statin therapy with therapeutic interchange.     Problem/Plan - 10:  ·  Problem: Myoclonic jerking.  ·  Plan; - c/w keppra  - Fall precautions.     Problem/Plan - 11:  ·  Problem: History of CVA (cerebrovascular accident).   ·  Plan: - c/w aspirin, statin therapy.     Problem/Plan - 12:  ·  Problem: SMA Thrombosis with  Ischemia ; .   ·  Plan: S/P  S/p SMA angiography with stent placement with diagnostic laparoscopy 12/24,   Surgery following  R/O Acute cholecystitis.   Over all management per [facility] team.     D/W grand son in room.

## 2023-12-26 NOTE — PROGRESS NOTE ADULT - SUBJECTIVE AND OBJECTIVE BOX
SICU Daily Progress Note  =====================================================  Interval events:  -oozing from port sites, applied surgicel and abdominal binder  - labetalol 10 x1 for HTN    ALLERGIES:  fluoroquinolone antibiotics (Other)  Cipro (Unknown)  Tegretol (Unknown)  carbamazepine (Other)      --------------------------------------------------------------------------------------    MEDICATIONS:    Neurologic Medications  aspirin Suppository 300 milliGRAM(s) Rectal daily  levETIRAcetam   Injectable 250 milliGRAM(s) IV Push every 12 hours    Respiratory Medications    Cardiovascular Medications    Gastrointestinal Medications  dextrose 5%. 1000 milliLiter(s) IV Continuous <Continuous>  dextrose 5%. 1000 milliLiter(s) IV Continuous <Continuous>  lactated ringers. 1000 milliLiter(s) IV Continuous <Continuous>    Genitourinary Medications    Hematologic/Oncologic Medications  heparin   Injectable 5000 Unit(s) SubCutaneous every 8 hours    Antimicrobial/Immunologic Medications  piperacillin/tazobactam IVPB.. 3.375 Gram(s) IV Intermittent every 12 hours  remdesivir  IVPB 100 milliGRAM(s) IV Intermittent every 24 hours  remdesivir  IVPB   IV Intermittent     Endocrine/Metabolic Medications  dextrose 50% Injectable 25 Gram(s) IV Push once  dextrose 50% Injectable 12.5 Gram(s) IV Push once  dextrose 50% Injectable 25 Gram(s) IV Push once  dextrose Oral Gel 15 Gram(s) Oral once PRN Blood Glucose LESS THAN 70 milliGRAM(s)/deciliter  glucagon  Injectable 1 milliGRAM(s) IntraMuscular once  insulin glargine Injectable (LANTUS) 20 Unit(s) SubCutaneous at bedtime  insulin lispro (ADMELOG) corrective regimen sliding scale   SubCutaneous every 6 hours    Topical/Other Medications  chlorhexidine 2% Cloths 1 Application(s) Topical daily    --------------------------------------------------------------------------------------    VITAL SIGNS:  ICU Vital Signs Last 24 Hrs  T(C): 36.8 (26 Dec 2023 00:00), Max: 37.5 (25 Dec 2023 12:00)  T(F): 98.2 (26 Dec 2023 00:00), Max: 99.5 (25 Dec 2023 12:00)  HR: 90 (26 Dec 2023 00:00) (79 - 90)  BP: --  BP(mean): --  ABP: 120/40 (26 Dec 2023 00:00) (92/36 - 170/58)  ABP(mean): 68 (26 Dec 2023 00:00) (20 - 99)  RR: 27 (26 Dec 2023 00:00) (12 - 29)  SpO2: 95% (26 Dec 2023 00:00) (94% - 99%)    O2 Parameters below as of 26 Dec 2023 00:00  Patient On (Oxygen Delivery Method): room air    --------------------------------------------------------------------------------------    INS AND OUTS:  I&O's Detail    24 Dec 2023 07:01  -  25 Dec 2023 07:00  --------------------------------------------------------  IN:    IV PiggyBack: 650 mL    Lactated Ringers: 600 mL    Oral Fluid: 330 mL  Total IN: 1580 mL    OUT:    Voided (mL): 760 mL  Total OUT: 760 mL    Total NET: 820 mL      25 Dec 2023 07:01  -  26 Dec 2023 00:09  --------------------------------------------------------  IN:    Lactated Ringers: 1700 mL  Total IN: 1700 mL    OUT:    Indwelling Catheter - Urethral (mL): 740 mL    Nasogastric/Oral tube (mL): 500 mL  Total OUT: 1240 mL    Total NET: 460 mL    --------------------------------------------------------------------------------------    PHYSICAL EXAM:  Gen: NAD  Resp: no increased WOB, satting well on RA  Cardiac: appears well perfused, extremities warm  Abd: soft, distended, incision sites with serosanguinous strikethrough   Extremities: warm, well perfused, L upper arm incision c/d/i, palpable pulses in UEs    METABOLIC / FLUIDS / ELECTROLYTES  dextrose 5%. 1000 milliLiter(s) IV Continuous <Continuous>  dextrose 5%. 1000 milliLiter(s) IV Continuous <Continuous>  lactated ringers. 1000 milliLiter(s) IV Continuous <Continuous>      HEMATOLOGIC  [x] VTE Prophylaxis: heparin   Injectable 5000 Unit(s) SubCutaneous every 8 hours    Transfusions:	[] PRBC	[] Platelets		[] FFP	[] Cryoprecipitate    INFECTIOUS DISEASE  Antimicrobials/Immunologic Medications:  piperacillin/tazobactam IVPB.. 3.375 Gram(s) IV Intermittent every 12 hours  remdesivir  IVPB   IV Intermittent   remdesivir  IVPB 100 milliGRAM(s) IV Intermittent every 24 hours    Day #      of     ***    TUBES / LINES / DRAINS  ***  [x] Peripheral IV  [] Central Venous Line     	[] R	[] L	[] IJ	[] Fem	[] SC	Date Placed:   [] Arterial Line		[] R	[] L	[] Fem	[] Rad	[] Ax	Date Placed:   [] PICC		[] Midline		[] Mediport  [] Urinary Catheter		Date Placed:   [x] Necessity of urinary, arterial, and venous catheters discussed    --------------------------------------------------------------------------------------    LABS                          8.8    12.26 )-----------( 193      ( 25 Dec 2023 12:15 )             25.1   12-25    133<L>  |  101  |  43<H>  ----------------------------<  190<H>  3.8   |  19<L>  |  1.96<H>    Ca    8.1<L>      25 Dec 2023 02:29  Phos  3.4     12-25  Mg     2.10     12-25    TPro  7.1  /  Alb  3.3  /  TBili  1.0  /  DBili  x   /  AST  45<H>  /  ALT  33  /  AlkPhos  58  12-24    --------------------------------------------------------------------------------------    OTHER LABORATORY:     IMAGING STUDIES:   CXR:

## 2023-12-26 NOTE — PROGRESS NOTE ADULT - ATTENDING COMMENTS
I agree with the detailed interval history, physical, and plan, which I have reviewed and edited where appropriate'; also agree with notes/assessment with my team on service.  I have personally examined the patient.  I was physically present for the key portions of the evaluation and management (E/M) service provided.  I reviewed all the pertinent data.  The patient is a critical care patient with life threatening hemodynamic and metabolic instability in SICU.  The SICU team has a constant risk benefit analyzes discussion and coordinating care with the primary team and all consultants.   The patient is in SICU with the chief complaint and diagnosis mentioned in the note.   The plan will be specified in the note.  89M s/p diagnostic laparoscopy and SMA stent placement with brachial cutdown in SICU for HD monitoring.   PHYSICAL EXAM:  Gen: NAD  Resp: clear  Cardiac: RR  Abd: soft, distended  Extremities: warm  PLAN:   Neurologic:  -A&Ox2  - Keppra  - Tylenol  Respiratory:  -Maintain O2 saturation >92%  -dc remdesivir  Cardiovascular:  -MAP goal >65  Gastrointestinal/Nutrition:  -Diet: NPO  -NGT  Genitourinary/Renal:  -Moss   -IVF: LR @ 100   Hematologic:  - SQH   - ASA   Infectious Disease:  -Zosyn   Endocrine:  -T2DM   -ISS, Lantus 20U   -Monitor glucose     Disposition: dc floor

## 2023-12-26 NOTE — DIETITIAN INITIAL EVALUATION ADULT - ADD RECOMMEND
1. Suggest advancing diet when medically feasible and appropriate. 2. Monitor weights, labs, BM's, skin integrity and edema. 3. Follow pt as per protocol.

## 2023-12-26 NOTE — PROGRESS NOTE ADULT - ASSESSMENT
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 CTA demonstrating mesenteric fat stranding associated with ascending/transverse colon. S/p SMA angiography with stent placement with diagnostic laparoscopy 12/24, small bowel and visible colon viable, some inflammation of omentum in RUQ.     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 CTA demonstrating mesenteric fat stranding associated with ascending/transverse colon. S/p SMA angiography with stent placement with diagnostic laparoscopy 12/24, small bowel and visible colon viable, some inflammation of omentum in RUQ.     - rec'd NGT clamp trial  - rec'd RUQ US - f/u results - possible need for perc asa   - Pain control   - Appreciate excellent care per SICU and primary     B Team Surgery   [id number]

## 2023-12-26 NOTE — PROGRESS NOTE ADULT - SUBJECTIVE AND OBJECTIVE BOX
Aashish Boyer MD  Interventional Cardiology / Advance Heart Failure and Cardiac Transplant Specialist  [place] Office : [address] [place] [address]  Tel:   [place] Office : [address] [facility] [address] [address]  Tel: [phone]      Subjective/Overnight events: Pt is lying in bed  not in distress  	  MEDICATIONS:  heparin   Injectable 5000 Unit(s) SubCutaneous every 8 hours  metoprolol tartrate Injectable 2.5 milliGRAM(s) IV Push every 6 hours  ticagrelor 60 milliGRAM(s) Oral every 12 hours    piperacillin/tazobactam IVPB.. 3.375 Gram(s) IV Intermittent every 12 hours      acetaminophen   IVPB .. 1000 milliGRAM(s) IV Intermittent every 6 hours  aspirin Suppository 300 milliGRAM(s) Rectal daily  levETIRAcetam   Injectable 250 milliGRAM(s) IV Push every 12 hours      dextrose 50% Injectable 25 Gram(s) IV Push once  dextrose 50% Injectable 25 Gram(s) IV Push once  dextrose 50% Injectable 12.5 Gram(s) IV Push once  dextrose Oral Gel 15 Gram(s) Oral once PRN  glucagon  Injectable 1 milliGRAM(s) IntraMuscular once  insulin glargine Injectable (LANTUS) 20 Unit(s) SubCutaneous at bedtime  insulin lispro (ADMELOG) corrective regimen sliding scale   SubCutaneous every 6 hours    chlorhexidine 2% Cloths 1 Application(s) Topical daily  dextrose 5%. 1000 milliLiter(s) IV Continuous <Continuous>  dextrose 5%. 1000 milliLiter(s) IV Continuous <Continuous>  lactated ringers. 1000 milliLiter(s) IV Continuous <Continuous>      PAST MEDICAL/SURGICAL HISTORY  PAST MEDICAL & SURGICAL HISTORY:  Hyperlipemia      Hypertension      Coronary Artery Disease      Diabetes Mellitus Type II      Stented Coronary Artery  total 5 stents, last stent 5/2019      Neuropathy      Myocardial infarction      Stroke  mild left facial numbness   no other residuals verbalized      Myoclonic jerking      Stage 3 chronic kidney disease      History of Cataract Extraction      Hx of CABG      H/O coronary angiogram      S/P coronary artery stent placement  1/6/09      S/P placement of cardiac pacemaker          SOCIAL HISTORY: Substance Use (street drugs): ( x ) never used  (  ) other:    FAMILY HISTORY:  No pertinent family history in first degree relatives        REVIEW OF SYSTEMS:  CONSTITUTIONAL: No fever, weight loss, or fatigue  EYES: No eye pain, visual disturbances, or discharge  ENMT:  No difficulty hearing, tinnitus, vertigo; No sinus or throat pain  BREASTS: No pain, masses, or nipple discharge  GASTROINTESTINAL: No abdominal or epigastric pain. No nausea, vomiting, or hematemesis; No diarrhea or constipation. No melena or hematochezia.  GENITOURINARY: No dysuria, frequency, hematuria, or incontinence  NEUROLOGICAL: No headaches, memory loss, loss of strength, numbness, or tremors  ENDOCRINE: No heat or cold intolerance; No hair loss  MUSCULOSKELETAL: No joint pain or swelling; No muscle, back, or extremity pain  PSYCHIATRIC: No depression, anxiety, mood swings, or difficulty sleeping  HEME/LYMPH: No easy bruising, or bleeding gums  All others negative    PHYSICAL EXAM:  T(C): 36.2 (12-26-23 @ 12:00), Max: 37.2 (12-25-23 @ 16:00)  HR: 87 (12-26-23 @ 13:00) (82 - 92)  BP: --  RR: 21 (12-26-23 @ 13:00) (17 - 29)  SpO2: 98% (12-26-23 @ 13:00) (95% - 99%)  Wt(kg): --  I&O's Summary    25 Dec 2023 07:01  -  26 Dec 2023 07:00  --------------------------------------------------------  IN: 3300 mL / OUT: 1670 mL / NET: 1630 mL    26 Dec 2023 07:01  -  26 Dec 2023 15:04  --------------------------------------------------------  IN: 700 mL / OUT: 325 mL / NET: 375 mL          GENERAL: confused   EYES: conjunctiva and sclera clear  ENMT: No tonsillar erythema, exudates, or enlargement  Cardiovascular: Normal S1 S2, No JVD, 2/6 systolic murmur   Respiratory: basal creps + 	  Gastrointestinal:  s/p laprotomy and SMA stenting  	  Extremities: No edema                                        7.7    11.88 )-----------( 184      ( 26 Dec 2023 01:05 )             22.3     12-26    137  |  105  |  47<H>  ----------------------------<  120<H>  3.3<L>   |  18<L>  |  2.25<H>    Ca    7.6<L>      26 Dec 2023 01:05  Phos  2.6     12-26  Mg     1.90     12-26    TPro  5.6<L>  /  Alb  2.6<L>  /  TBili  0.4  /  DBili  0.2  /  AST  30  /  ALT  16  /  AlkPhos  50  12-26    proBNP:   Lipid Profile:   HgA1c:   TSH:     Consultant(s) Notes Reviewed:  [x ] YES  [ ] NO    Care Discussed with Consultants/Other Providers [ x] YES  [ ] NO    Imaging Personally Reviewed independently:  [x] YES  [ ] NO    All labs, radiologic studies, vitals, orders and medications list reviewed. Patient is seen and examined at bedside. Case discussed with medical team.         Aashish Boyer MD  Interventional Cardiology / Advance Heart Failure and Cardiac Transplant Specialist  [place] Office : [address] [place] [address]  Tel:   [place] Office : [address] [facility] [address] [address]  Tel: [phone]      Subjective/Overnight events: Pt is lying in bed  not in distress  	  MEDICATIONS:  heparin   Injectable 5000 Unit(s) SubCutaneous every 8 hours  metoprolol tartrate Injectable 2.5 milliGRAM(s) IV Push every 6 hours  ticagrelor 60 milliGRAM(s) Oral every 12 hours    piperacillin/tazobactam IVPB.. 3.375 Gram(s) IV Intermittent every 12 hours      acetaminophen   IVPB .. 1000 milliGRAM(s) IV Intermittent every 6 hours  aspirin Suppository 300 milliGRAM(s) Rectal daily  levETIRAcetam   Injectable 250 milliGRAM(s) IV Push every 12 hours      dextrose 50% Injectable 25 Gram(s) IV Push once  dextrose 50% Injectable 25 Gram(s) IV Push once  dextrose 50% Injectable 12.5 Gram(s) IV Push once  dextrose Oral Gel 15 Gram(s) Oral once PRN  glucagon  Injectable 1 milliGRAM(s) IntraMuscular once  insulin glargine Injectable (LANTUS) 20 Unit(s) SubCutaneous at bedtime  insulin lispro (ADMELOG) corrective regimen sliding scale   SubCutaneous every 6 hours    chlorhexidine 2% Cloths 1 Application(s) Topical daily  dextrose 5%. 1000 milliLiter(s) IV Continuous <Continuous>  dextrose 5%. 1000 milliLiter(s) IV Continuous <Continuous>  lactated ringers. 1000 milliLiter(s) IV Continuous <Continuous>      PAST MEDICAL/SURGICAL HISTORY  PAST MEDICAL & SURGICAL HISTORY:  Hyperlipemia      Hypertension      Coronary Artery Disease      Diabetes Mellitus Type II      Stented Coronary Artery  total 5 stents, last stent 5/2019      Neuropathy      Myocardial infarction      Stroke  mild left facial numbness   no other residuals verbalized      Myoclonic jerking      Stage 3 chronic kidney disease      History of Cataract Extraction      Hx of CABG      H/O coronary angiogram      S/P coronary artery stent placement  1/6/09      S/P placement of cardiac pacemaker          SOCIAL HISTORY: Substance Use (street drugs): ( x ) never used  (  ) other:    FAMILY HISTORY:  No pertinent family history in first degree relatives        REVIEW OF SYSTEMS:  CONSTITUTIONAL: No fever, weight loss, or fatigue  EYES: No eye pain, visual disturbances, or discharge  ENMT:  No difficulty hearing, tinnitus, vertigo; No sinus or throat pain  BREASTS: No pain, masses, or nipple discharge  GASTROINTESTINAL: No abdominal or epigastric pain. No nausea, vomiting, or hematemesis; No diarrhea or constipation. No melena or hematochezia.  GENITOURINARY: No dysuria, frequency, hematuria, or incontinence  NEUROLOGICAL: No headaches, memory loss, loss of strength, numbness, or tremors  ENDOCRINE: No heat or cold intolerance; No hair loss  MUSCULOSKELETAL: No joint pain or swelling; No muscle, back, or extremity pain  PSYCHIATRIC: No depression, anxiety, mood swings, or difficulty sleeping  HEME/LYMPH: No easy bruising, or bleeding gums  All others negative    PHYSICAL EXAM:  T(C): 36.2 (12-26-23 @ 12:00), Max: 37.2 (12-25-23 @ 16:00)  HR: 87 (12-26-23 @ 13:00) (82 - 92)  BP: --  RR: 21 (12-26-23 @ 13:00) (17 - 29)  SpO2: 98% (12-26-23 @ 13:00) (95% - 99%)  Wt(kg): --  I&O's Summary    25 Dec 2023 07:01  -  26 Dec 2023 07:00  --------------------------------------------------------  IN: 3300 mL / OUT: 1670 mL / NET: 1630 mL    26 Dec 2023 07:01  -  26 Dec 2023 15:04  --------------------------------------------------------  IN: 700 mL / OUT: 325 mL / NET: 375 mL          GENERAL: confused   EYES: conjunctiva and sclera clear  ENMT: No tonsillar erythema, exudates, or enlargement  Cardiovascular: Normal S1 S2, No JVD, 2/6 systolic murmur   Respiratory: basal creps + 	  Gastrointestinal:  s/p laprotomy and SMA stenting  	  Extremities: No edema                                        7.7    11.88 )-----------( 184      ( 26 Dec 2023 01:05 )             22.3     12-26    137  |  105  |  47<H>  ----------------------------<  120<H>  3.3<L>   |  18<L>  |  2.25<H>    Ca    7.6<L>      26 Dec 2023 01:05  Phos  2.6     12-26  Mg     1.90     12-26    TPro  5.6<L>  /  Alb  2.6<L>  /  TBili  0.4  /  DBili  0.2  /  AST  30  /  ALT  16  /  AlkPhos  50  12-26    proBNP:   Lipid Profile:   HgA1c:   TSH:     Consultant(s) Notes Reviewed:  [x ] YES  [ ] NO    Care Discussed with Consultants/Other Providers [ x] YES  [ ] NO    Imaging Personally Reviewed independently:  [x] YES  [ ] NO    All labs, radiologic studies, vitals, orders and medications list reviewed. Patient is seen and examined at bedside. Case discussed with medical team.

## 2023-12-26 NOTE — DIETITIAN INITIAL EVALUATION ADULT - OTHER INFO
89 y/o male with hx CKD Stage III, Stroke, MI, HLD, HTN, CAD, DM admitted with PNA 2* Covid-19 virus. Pt being maintained NPO at this with IVF. NGT to LCWS with 600 mL output over the past 24 hrs. Pt an unreliable historian an not able to provide nutrition hx. No GI distress at present; pt with fecal incontinence and last BM 12/24. NKFA. FS over the past 24 hrs 106 - 187 mg/dl with Lantus and Admelog insulins ordered for coverage. Suggest advancing diet when medically feasible and appropriate. Please consult this service once nutrition plan of care has been established so that appropriate nutrition intervention can be provided in a timely manner. RDN services to remain available as needed.  89 y/o male with hx CKD Stage III, Stroke, MI, HLD, HTN, CAD, DM admitted with PNA 2* Covid-19 virus. Pt being maintained NPO at this with IVF. NGT to LCWS with 600 mL output over the past 24 hrs. Pt an unreliable historian an not able to provide nutrition hx. No GI distress at present; pt with fecal incontinence and last BM 12/24. NKFA. FS over the past 24 hrs 106 - 187 mg/dl with Lantus and Admelog insulins ordered for coverage. Suggest advancing diet when medically feasible and appropriate. Please consult this service once nutrition plan of care has been established so that appropriate nutrition intervention can be provided in a timely manner. RDN services to remain available as needed.

## 2023-12-27 LAB
ALBUMIN SERPL ELPH-MCNC: 2.9 G/DL — LOW (ref 3.3–5)
ALBUMIN SERPL ELPH-MCNC: 2.9 G/DL — LOW (ref 3.3–5)
ALP SERPL-CCNC: 65 U/L — SIGNIFICANT CHANGE UP (ref 40–120)
ALP SERPL-CCNC: 65 U/L — SIGNIFICANT CHANGE UP (ref 40–120)
ALT FLD-CCNC: 13 U/L — SIGNIFICANT CHANGE UP (ref 4–41)
ALT FLD-CCNC: 13 U/L — SIGNIFICANT CHANGE UP (ref 4–41)
ANION GAP SERPL CALC-SCNC: 14 MMOL/L — SIGNIFICANT CHANGE UP (ref 7–14)
ANION GAP SERPL CALC-SCNC: 14 MMOL/L — SIGNIFICANT CHANGE UP (ref 7–14)
ANION GAP SERPL CALC-SCNC: 15 MMOL/L — HIGH (ref 7–14)
ANION GAP SERPL CALC-SCNC: 15 MMOL/L — HIGH (ref 7–14)
AST SERPL-CCNC: 24 U/L — SIGNIFICANT CHANGE UP (ref 4–40)
AST SERPL-CCNC: 24 U/L — SIGNIFICANT CHANGE UP (ref 4–40)
BILIRUB SERPL-MCNC: 0.8 MG/DL — SIGNIFICANT CHANGE UP (ref 0.2–1.2)
BILIRUB SERPL-MCNC: 0.8 MG/DL — SIGNIFICANT CHANGE UP (ref 0.2–1.2)
BLD GP AB SCN SERPL QL: NEGATIVE — SIGNIFICANT CHANGE UP
BLD GP AB SCN SERPL QL: NEGATIVE — SIGNIFICANT CHANGE UP
BUN SERPL-MCNC: 40 MG/DL — HIGH (ref 7–23)
BUN SERPL-MCNC: 40 MG/DL — HIGH (ref 7–23)
BUN SERPL-MCNC: 42 MG/DL — HIGH (ref 7–23)
BUN SERPL-MCNC: 42 MG/DL — HIGH (ref 7–23)
CALCIUM SERPL-MCNC: 7.6 MG/DL — LOW (ref 8.4–10.5)
CALCIUM SERPL-MCNC: 7.6 MG/DL — LOW (ref 8.4–10.5)
CALCIUM SERPL-MCNC: 8.1 MG/DL — LOW (ref 8.4–10.5)
CALCIUM SERPL-MCNC: 8.1 MG/DL — LOW (ref 8.4–10.5)
CHLORIDE SERPL-SCNC: 104 MMOL/L — SIGNIFICANT CHANGE UP (ref 98–107)
CHLORIDE SERPL-SCNC: 104 MMOL/L — SIGNIFICANT CHANGE UP (ref 98–107)
CHLORIDE SERPL-SCNC: 105 MMOL/L — SIGNIFICANT CHANGE UP (ref 98–107)
CHLORIDE SERPL-SCNC: 105 MMOL/L — SIGNIFICANT CHANGE UP (ref 98–107)
CO2 SERPL-SCNC: 19 MMOL/L — LOW (ref 22–31)
CREAT SERPL-MCNC: 1.89 MG/DL — HIGH (ref 0.5–1.3)
CREAT SERPL-MCNC: 1.89 MG/DL — HIGH (ref 0.5–1.3)
CREAT SERPL-MCNC: 2 MG/DL — HIGH (ref 0.5–1.3)
CREAT SERPL-MCNC: 2 MG/DL — HIGH (ref 0.5–1.3)
EGFR: 31 ML/MIN/1.73M2 — LOW
EGFR: 31 ML/MIN/1.73M2 — LOW
EGFR: 33 ML/MIN/1.73M2 — LOW
EGFR: 33 ML/MIN/1.73M2 — LOW
GLUCOSE SERPL-MCNC: 237 MG/DL — HIGH (ref 70–99)
GLUCOSE SERPL-MCNC: 237 MG/DL — HIGH (ref 70–99)
GLUCOSE SERPL-MCNC: 89 MG/DL — SIGNIFICANT CHANGE UP (ref 70–99)
GLUCOSE SERPL-MCNC: 89 MG/DL — SIGNIFICANT CHANGE UP (ref 70–99)
HCT VFR BLD CALC: 20.7 % — CRITICAL LOW (ref 39–50)
HCT VFR BLD CALC: 20.7 % — CRITICAL LOW (ref 39–50)
HCT VFR BLD CALC: 22.4 % — LOW (ref 39–50)
HCT VFR BLD CALC: 22.4 % — LOW (ref 39–50)
HGB BLD-MCNC: 7.6 G/DL — LOW (ref 13–17)
HGB BLD-MCNC: 7.6 G/DL — LOW (ref 13–17)
HGB BLD-MCNC: 7.8 G/DL — LOW (ref 13–17)
HGB BLD-MCNC: 7.8 G/DL — LOW (ref 13–17)
MAGNESIUM SERPL-MCNC: 1.9 MG/DL — SIGNIFICANT CHANGE UP (ref 1.6–2.6)
MAGNESIUM SERPL-MCNC: 1.9 MG/DL — SIGNIFICANT CHANGE UP (ref 1.6–2.6)
MAGNESIUM SERPL-MCNC: 2.1 MG/DL — SIGNIFICANT CHANGE UP (ref 1.6–2.6)
MAGNESIUM SERPL-MCNC: 2.1 MG/DL — SIGNIFICANT CHANGE UP (ref 1.6–2.6)
MCHC RBC-ENTMCNC: 30.2 PG — SIGNIFICANT CHANGE UP (ref 27–34)
MCHC RBC-ENTMCNC: 30.2 PG — SIGNIFICANT CHANGE UP (ref 27–34)
MCHC RBC-ENTMCNC: 30.8 PG — SIGNIFICANT CHANGE UP (ref 27–34)
MCHC RBC-ENTMCNC: 30.8 PG — SIGNIFICANT CHANGE UP (ref 27–34)
MCHC RBC-ENTMCNC: 34.8 GM/DL — SIGNIFICANT CHANGE UP (ref 32–36)
MCHC RBC-ENTMCNC: 34.8 GM/DL — SIGNIFICANT CHANGE UP (ref 32–36)
MCHC RBC-ENTMCNC: 36.7 GM/DL — HIGH (ref 32–36)
MCHC RBC-ENTMCNC: 36.7 GM/DL — HIGH (ref 32–36)
MCV RBC AUTO: 83.8 FL — SIGNIFICANT CHANGE UP (ref 80–100)
MCV RBC AUTO: 83.8 FL — SIGNIFICANT CHANGE UP (ref 80–100)
MCV RBC AUTO: 86.8 FL — SIGNIFICANT CHANGE UP (ref 80–100)
MCV RBC AUTO: 86.8 FL — SIGNIFICANT CHANGE UP (ref 80–100)
NRBC # BLD: 0 /100 WBCS — SIGNIFICANT CHANGE UP (ref 0–0)
NRBC # FLD: 0 K/UL — SIGNIFICANT CHANGE UP (ref 0–0)
PHOSPHATE SERPL-MCNC: 3.2 MG/DL — SIGNIFICANT CHANGE UP (ref 2.5–4.5)
PHOSPHATE SERPL-MCNC: 3.2 MG/DL — SIGNIFICANT CHANGE UP (ref 2.5–4.5)
PHOSPHATE SERPL-MCNC: 3.3 MG/DL — SIGNIFICANT CHANGE UP (ref 2.5–4.5)
PHOSPHATE SERPL-MCNC: 3.3 MG/DL — SIGNIFICANT CHANGE UP (ref 2.5–4.5)
PLATELET # BLD AUTO: 233 K/UL — SIGNIFICANT CHANGE UP (ref 150–400)
PLATELET # BLD AUTO: 233 K/UL — SIGNIFICANT CHANGE UP (ref 150–400)
PLATELET # BLD AUTO: 254 K/UL — SIGNIFICANT CHANGE UP (ref 150–400)
PLATELET # BLD AUTO: 254 K/UL — SIGNIFICANT CHANGE UP (ref 150–400)
POTASSIUM SERPL-MCNC: 3.5 MMOL/L — SIGNIFICANT CHANGE UP (ref 3.5–5.3)
POTASSIUM SERPL-MCNC: 3.5 MMOL/L — SIGNIFICANT CHANGE UP (ref 3.5–5.3)
POTASSIUM SERPL-MCNC: 4.1 MMOL/L — SIGNIFICANT CHANGE UP (ref 3.5–5.3)
POTASSIUM SERPL-MCNC: 4.1 MMOL/L — SIGNIFICANT CHANGE UP (ref 3.5–5.3)
POTASSIUM SERPL-SCNC: 3.5 MMOL/L — SIGNIFICANT CHANGE UP (ref 3.5–5.3)
POTASSIUM SERPL-SCNC: 3.5 MMOL/L — SIGNIFICANT CHANGE UP (ref 3.5–5.3)
POTASSIUM SERPL-SCNC: 4.1 MMOL/L — SIGNIFICANT CHANGE UP (ref 3.5–5.3)
POTASSIUM SERPL-SCNC: 4.1 MMOL/L — SIGNIFICANT CHANGE UP (ref 3.5–5.3)
PROT SERPL-MCNC: 6.3 G/DL — SIGNIFICANT CHANGE UP (ref 6–8.3)
PROT SERPL-MCNC: 6.3 G/DL — SIGNIFICANT CHANGE UP (ref 6–8.3)
RBC # BLD: 2.47 M/UL — LOW (ref 4.2–5.8)
RBC # BLD: 2.47 M/UL — LOW (ref 4.2–5.8)
RBC # BLD: 2.58 M/UL — LOW (ref 4.2–5.8)
RBC # BLD: 2.58 M/UL — LOW (ref 4.2–5.8)
RBC # FLD: 14.6 % — HIGH (ref 10.3–14.5)
RBC # FLD: 14.6 % — HIGH (ref 10.3–14.5)
RBC # FLD: 14.8 % — HIGH (ref 10.3–14.5)
RBC # FLD: 14.8 % — HIGH (ref 10.3–14.5)
RH IG SCN BLD-IMP: POSITIVE — SIGNIFICANT CHANGE UP
RH IG SCN BLD-IMP: POSITIVE — SIGNIFICANT CHANGE UP
SODIUM SERPL-SCNC: 137 MMOL/L — SIGNIFICANT CHANGE UP (ref 135–145)
SODIUM SERPL-SCNC: 137 MMOL/L — SIGNIFICANT CHANGE UP (ref 135–145)
SODIUM SERPL-SCNC: 139 MMOL/L — SIGNIFICANT CHANGE UP (ref 135–145)
SODIUM SERPL-SCNC: 139 MMOL/L — SIGNIFICANT CHANGE UP (ref 135–145)
WBC # BLD: 14.97 K/UL — HIGH (ref 3.8–10.5)
WBC # BLD: 14.97 K/UL — HIGH (ref 3.8–10.5)
WBC # BLD: 18.15 K/UL — HIGH (ref 3.8–10.5)
WBC # BLD: 18.15 K/UL — HIGH (ref 3.8–10.5)
WBC # FLD AUTO: 14.97 K/UL — HIGH (ref 3.8–10.5)
WBC # FLD AUTO: 14.97 K/UL — HIGH (ref 3.8–10.5)
WBC # FLD AUTO: 18.15 K/UL — HIGH (ref 3.8–10.5)
WBC # FLD AUTO: 18.15 K/UL — HIGH (ref 3.8–10.5)

## 2023-12-27 PROCEDURE — 99233 SBSQ HOSP IP/OBS HIGH 50: CPT

## 2023-12-27 PROCEDURE — 99232 SBSQ HOSP IP/OBS MODERATE 35: CPT

## 2023-12-27 RX ORDER — METOPROLOL TARTRATE 50 MG
25 TABLET ORAL
Refills: 0 | Status: DISCONTINUED | OUTPATIENT
Start: 2023-12-27 | End: 2024-01-01

## 2023-12-27 RX ORDER — MEMANTINE HYDROCHLORIDE 10 MG/1
5 TABLET ORAL
Refills: 0 | Status: DISCONTINUED | OUTPATIENT
Start: 2023-12-27 | End: 2024-01-01

## 2023-12-27 RX ORDER — LABETALOL HCL 100 MG
10 TABLET ORAL ONCE
Refills: 0 | Status: COMPLETED | OUTPATIENT
Start: 2023-12-27 | End: 2023-12-27

## 2023-12-27 RX ORDER — POTASSIUM CHLORIDE 20 MEQ
10 PACKET (EA) ORAL
Refills: 0 | Status: COMPLETED | OUTPATIENT
Start: 2023-12-27 | End: 2023-12-27

## 2023-12-27 RX ORDER — GABAPENTIN 400 MG/1
100 CAPSULE ORAL
Refills: 0 | Status: DISCONTINUED | OUTPATIENT
Start: 2023-12-27 | End: 2023-12-27

## 2023-12-27 RX ORDER — MAGNESIUM SULFATE 500 MG/ML
1 VIAL (ML) INJECTION ONCE
Refills: 0 | Status: COMPLETED | OUTPATIENT
Start: 2023-12-27 | End: 2023-12-27

## 2023-12-27 RX ORDER — ESCITALOPRAM OXALATE 10 MG/1
10 TABLET, FILM COATED ORAL DAILY
Refills: 0 | Status: DISCONTINUED | OUTPATIENT
Start: 2023-12-27 | End: 2024-01-01

## 2023-12-27 RX ORDER — INSULIN LISPRO 100/ML
VIAL (ML) SUBCUTANEOUS EVERY 6 HOURS
Refills: 0 | Status: DISCONTINUED | OUTPATIENT
Start: 2023-12-27 | End: 2023-12-30

## 2023-12-27 RX ORDER — METOPROLOL TARTRATE 50 MG
5 TABLET ORAL EVERY 6 HOURS
Refills: 0 | Status: DISCONTINUED | OUTPATIENT
Start: 2023-12-27 | End: 2023-12-27

## 2023-12-27 RX ORDER — ACETAMINOPHEN 500 MG
650 TABLET ORAL ONCE
Refills: 0 | Status: COMPLETED | OUTPATIENT
Start: 2023-12-27 | End: 2023-12-27

## 2023-12-27 RX ORDER — HYDROMORPHONE HYDROCHLORIDE 2 MG/ML
0.5 INJECTION INTRAMUSCULAR; INTRAVENOUS; SUBCUTANEOUS ONCE
Refills: 0 | Status: DISCONTINUED | OUTPATIENT
Start: 2023-12-27 | End: 2023-12-27

## 2023-12-27 RX ORDER — ASPIRIN/CALCIUM CARB/MAGNESIUM 324 MG
81 TABLET ORAL DAILY
Refills: 0 | Status: DISCONTINUED | OUTPATIENT
Start: 2023-12-27 | End: 2024-01-01

## 2023-12-27 RX ORDER — LEVETIRACETAM 250 MG/1
250 TABLET, FILM COATED ORAL
Refills: 0 | Status: DISCONTINUED | OUTPATIENT
Start: 2023-12-27 | End: 2024-01-01

## 2023-12-27 RX ORDER — ACETAMINOPHEN 500 MG
975 TABLET ORAL EVERY 6 HOURS
Refills: 0 | Status: COMPLETED | OUTPATIENT
Start: 2023-12-27 | End: 2023-12-30

## 2023-12-27 RX ORDER — RANOLAZINE 500 MG/1
500 TABLET, FILM COATED, EXTENDED RELEASE ORAL
Refills: 0 | Status: DISCONTINUED | OUTPATIENT
Start: 2023-12-27 | End: 2024-01-01

## 2023-12-27 RX ORDER — CALCIUM GLUCONATE 100 MG/ML
2 VIAL (ML) INTRAVENOUS ONCE
Refills: 0 | Status: COMPLETED | OUTPATIENT
Start: 2023-12-27 | End: 2023-12-27

## 2023-12-27 RX ORDER — INSULIN LISPRO 100/ML
VIAL (ML) SUBCUTANEOUS
Refills: 0 | Status: DISCONTINUED | OUTPATIENT
Start: 2023-12-27 | End: 2023-12-27

## 2023-12-27 RX ORDER — ATORVASTATIN CALCIUM 80 MG/1
80 TABLET, FILM COATED ORAL AT BEDTIME
Refills: 0 | Status: DISCONTINUED | OUTPATIENT
Start: 2023-12-27 | End: 2024-01-01

## 2023-12-27 RX ORDER — METOPROLOL TARTRATE 50 MG
5 TABLET ORAL ONCE
Refills: 0 | Status: COMPLETED | OUTPATIENT
Start: 2023-12-27 | End: 2023-12-27

## 2023-12-27 RX ADMIN — Medication 10 MILLIGRAM(S): at 03:45

## 2023-12-27 RX ADMIN — INSULIN GLARGINE 20 UNIT(S): 100 INJECTION, SOLUTION SUBCUTANEOUS at 23:06

## 2023-12-27 RX ADMIN — Medication 81 MILLIGRAM(S): at 11:27

## 2023-12-27 RX ADMIN — TICAGRELOR 60 MILLIGRAM(S): 90 TABLET ORAL at 05:48

## 2023-12-27 RX ADMIN — Medication 650 MILLIGRAM(S): at 20:17

## 2023-12-27 RX ADMIN — LEVETIRACETAM 250 MILLIGRAM(S): 250 TABLET, FILM COATED ORAL at 17:03

## 2023-12-27 RX ADMIN — SODIUM CHLORIDE 50 MILLILITER(S): 9 INJECTION, SOLUTION INTRAVENOUS at 19:50

## 2023-12-27 RX ADMIN — HEPARIN SODIUM 5000 UNIT(S): 5000 INJECTION INTRAVENOUS; SUBCUTANEOUS at 05:48

## 2023-12-27 RX ADMIN — Medication 25 MILLIGRAM(S): at 22:56

## 2023-12-27 RX ADMIN — PIPERACILLIN AND TAZOBACTAM 25 GRAM(S): 4; .5 INJECTION, POWDER, LYOPHILIZED, FOR SOLUTION INTRAVENOUS at 17:02

## 2023-12-27 RX ADMIN — Medication 100 GRAM(S): at 03:11

## 2023-12-27 RX ADMIN — Medication 5 MILLIGRAM(S): at 07:03

## 2023-12-27 RX ADMIN — PIPERACILLIN AND TAZOBACTAM 25 GRAM(S): 4; .5 INJECTION, POWDER, LYOPHILIZED, FOR SOLUTION INTRAVENOUS at 05:46

## 2023-12-27 RX ADMIN — SODIUM CHLORIDE 100 MILLILITER(S): 9 INJECTION, SOLUTION INTRAVENOUS at 09:51

## 2023-12-27 RX ADMIN — RANOLAZINE 500 MILLIGRAM(S): 500 TABLET, FILM COATED, EXTENDED RELEASE ORAL at 17:34

## 2023-12-27 RX ADMIN — Medication 2.5 MILLIGRAM(S): at 05:48

## 2023-12-27 RX ADMIN — Medication 100 MILLIEQUIVALENT(S): at 04:32

## 2023-12-27 RX ADMIN — MEMANTINE HYDROCHLORIDE 5 MILLIGRAM(S): 10 TABLET ORAL at 17:03

## 2023-12-27 RX ADMIN — Medication 100 MILLIEQUIVALENT(S): at 05:47

## 2023-12-27 RX ADMIN — HEPARIN SODIUM 5000 UNIT(S): 5000 INJECTION INTRAVENOUS; SUBCUTANEOUS at 22:56

## 2023-12-27 RX ADMIN — SODIUM CHLORIDE 100 MILLILITER(S): 9 INJECTION, SOLUTION INTRAVENOUS at 11:27

## 2023-12-27 RX ADMIN — Medication 25 MILLIGRAM(S): at 09:48

## 2023-12-27 RX ADMIN — SODIUM CHLORIDE 50 MILLILITER(S): 9 INJECTION, SOLUTION INTRAVENOUS at 16:05

## 2023-12-27 RX ADMIN — Medication 1000 MILLIGRAM(S): at 06:16

## 2023-12-27 RX ADMIN — Medication 100 MILLIEQUIVALENT(S): at 03:10

## 2023-12-27 RX ADMIN — ESCITALOPRAM OXALATE 10 MILLIGRAM(S): 10 TABLET, FILM COATED ORAL at 12:43

## 2023-12-27 RX ADMIN — HEPARIN SODIUM 5000 UNIT(S): 5000 INJECTION INTRAVENOUS; SUBCUTANEOUS at 14:00

## 2023-12-27 RX ADMIN — Medication 650 MILLIGRAM(S): at 19:47

## 2023-12-27 RX ADMIN — LEVETIRACETAM 250 MILLIGRAM(S): 250 TABLET, FILM COATED ORAL at 05:49

## 2023-12-27 RX ADMIN — CHLORHEXIDINE GLUCONATE 1 APPLICATION(S): 213 SOLUTION TOPICAL at 13:26

## 2023-12-27 RX ADMIN — Medication 4: at 11:34

## 2023-12-27 RX ADMIN — TICAGRELOR 60 MILLIGRAM(S): 90 TABLET ORAL at 17:02

## 2023-12-27 RX ADMIN — Medication 200 GRAM(S): at 03:08

## 2023-12-27 RX ADMIN — Medication 975 MILLIGRAM(S): at 23:06

## 2023-12-27 RX ADMIN — Medication 400 MILLIGRAM(S): at 05:46

## 2023-12-27 RX ADMIN — Medication 6: at 23:04

## 2023-12-27 RX ADMIN — ATORVASTATIN CALCIUM 80 MILLIGRAM(S): 80 TABLET, FILM COATED ORAL at 22:55

## 2023-12-27 RX ADMIN — Medication 975 MILLIGRAM(S): at 23:36

## 2023-12-27 RX ADMIN — Medication 6: at 16:05

## 2023-12-27 NOTE — PROGRESS NOTE ADULT - ASSESSMENT
PLAN:   Neurologic:  -A&Ox2 (baseline per family)   -Continue Keppra- hx of myoclonic jerks   -Pain: Tylenol    Respiratory:  -Maintain O2 saturation >92%  -COVID +  -Dc remdesivir 12/26 iso worsening CKD    Cardiovascular:  -MAP goal >65  -History of HTN- holding home Ranolazine   -Resume home metoprolol when tolerarting PO     Gastrointestinal/Nutrition:  -Diet: NPO  -F/u RUQ US 12/26-> GB distended with cholelithiasis, equivocal     Genitourinary/Renal:  -Moss   -MABLE on CKD (Baseline Cr 1.2-1.3)   -IVF: D5 LR @ 100   -Monitor strict I/Os     Hematologic:  -DVT ppx: SQH   -ASA per rectum and Brilinta PO for hx stents, CVA x3  -Apply surgicel prn and abdominal binder for oozing from port sites    Infectious Disease:  -Abx: Zosyn, s/p remdesivir dced on 12/26 for worsening MABLE  -Monitor WBC   -Monitor fever curve     Endocrine:  -T2DM   -ISS, Lantus 20U   -Monitor blood glucose     Disposition: SICU  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 Surgery consulted on 12/24 for abdominal pain and distension. CTA AP obtained which showed  partially occlusive calcified and non-calcified plaque just distal to take-off of the SMA, with estimated at least 75% luminal narrowing. Limited evaluation of its distal branches. Patient taken emergently to OR on 12/24 with general surgery and vascular surgery. Patient s/p diagnostic laparoscopy and SMA stent placement with brachial cutdown. SICU consulted postoperatively for HD monitoring.     PLAN:   Neurologic:  -A&Ox2 (baseline per family)   -Continue Keppra- hx of myoclonic jerks   -Pain: Tylenol  -ASA 81 for hx CVA, stents    Respiratory:  -Maintain O2 saturation >92%  -COVID +  -Dc remdesivir 12/26 iso worsening CKD    Cardiovascular:  -MAP goal >65  -History of HTN- holding home Ranolazine   -Metoprolol 25 bid  -ASA 81 qd      Gastrointestinal/Nutrition:  -Diet: CLD  -F/u RUQ US 12/26-> GB distended with cholelithiasis, equivocal   -Trial abx for cholecystitis, no plan for percutaneous cholecystostomy at this time    Genitourinary/Renal:  -Moss   -MABLE on CKD (Baseline Cr 1.2-1.3)   -IVF: D5 LR @ 100   -Monitor strict I/Os     Hematologic:  -DVT ppx: SQH   -ASA 81 PO for hx stents, CVA x3  -Brilinta qd  -Apply surgicel prn and abdominal binder for oozing from port sites    Infectious Disease:  -Abx: Zosyn through 12/28, s/p remdesivir dced on 12/26 for worsening MABLE  -Monitor WBC   -Monitor fever curve     Endocrine:  -T2DM   -ISS, Lantus 20U   -Monitor blood glucose     Disposition: Listed

## 2023-12-27 NOTE — PROGRESS NOTE ADULT - SUBJECTIVE AND OBJECTIVE BOX
SICU Daily Progress Note  =====================================================  Interval events:  -NGT self DCed  -RUQ US ordered to r/o cholecystitis-> GB distended with cholelithiasis, equivocal  -Continue NPO today, plan to start CLD tomorrow  -Resume home Brilinta  -Zosyn through and including 12/28  -Resume home metoprolol  -Dc Remdesivir  - IVF to d5 LR      Allergies: fluoroquinolone antibiotics (Other)  Cipro (Unknown)  Tegretol (Unknown)  carbamazepine (Other)      MEDICATIONS:   --------------------------------------------------------------------------------------  Neurologic Medications  acetaminophen   IVPB .. 1000 milliGRAM(s) IV Intermittent every 6 hours  aspirin Suppository 300 milliGRAM(s) Rectal daily  levETIRAcetam   Injectable 250 milliGRAM(s) IV Push every 12 hours    Respiratory Medications    Cardiovascular Medications  metoprolol tartrate Injectable 2.5 milliGRAM(s) IV Push every 6 hours    Gastrointestinal Medications  dextrose 5% + lactated ringers. 1000 milliLiter(s) IV Continuous <Continuous>  dextrose 5%. 1000 milliLiter(s) IV Continuous <Continuous>  dextrose 5%. 1000 milliLiter(s) IV Continuous <Continuous>    Genitourinary Medications    Hematologic/Oncologic Medications  heparin   Injectable 5000 Unit(s) SubCutaneous every 8 hours  ticagrelor 60 milliGRAM(s) Oral every 12 hours    Antimicrobial/Immunologic Medications  piperacillin/tazobactam IVPB.. 3.375 Gram(s) IV Intermittent every 12 hours    Endocrine/Metabolic Medications  dextrose 50% Injectable 25 Gram(s) IV Push once  dextrose 50% Injectable 25 Gram(s) IV Push once  dextrose 50% Injectable 12.5 Gram(s) IV Push once  dextrose Oral Gel 15 Gram(s) Oral once PRN Blood Glucose LESS THAN 70 milliGRAM(s)/deciliter  glucagon  Injectable 1 milliGRAM(s) IntraMuscular once  insulin glargine Injectable (LANTUS) 20 Unit(s) SubCutaneous at bedtime  insulin lispro (ADMELOG) corrective regimen sliding scale   SubCutaneous every 6 hours    Topical/Other Medications  chlorhexidine 2% Cloths 1 Application(s) Topical daily    --------------------------------------------------------------------------------------    VITAL SIGNS, INS/OUTS (last 24 hours):  --------------------------------------------------------------------------------------  ICU Vital Signs Last 24 Hrs  T(C): 36 (27 Dec 2023 00:00), Max: 37.1 (26 Dec 2023 16:00)  T(F): 96.8 (27 Dec 2023 00:00), Max: 98.7 (26 Dec 2023 16:00)  HR: 86 (27 Dec 2023 00:00) (82 - 92)  BP: --  BP(mean): --  ABP: 150/49 (27 Dec 2023 00:00) (133/44 - 172/65)  ABP(mean): 88 (27 Dec 2023 00:00) (77 - 107)  RR: 21 (27 Dec 2023 00:00) (16 - 29)  SpO2: 96% (27 Dec 2023 00:00) (95% - 99%)    O2 Parameters below as of 27 Dec 2023 00:00  Patient On (Oxygen Delivery Method): room air        I&O's Summary    25 Dec 2023 07:01  -  26 Dec 2023 07:00  --------------------------------------------------------  IN: 3300 mL / OUT: 1670 mL / NET: 1630 mL    26 Dec 2023 07:01  -  27 Dec 2023 00:33  --------------------------------------------------------  IN: 1700 mL / OUT: 660 mL / NET: 1040 mL      --------------------------------------------------------------------------------------    EXAM    Gen: NAD  Resp: no increased WOB, satting well on RA  Cardiac: appears well perfused, extremities warm  Abd: soft, distended, incision sites with serosanguinous strikethrough   Extremities: warm, well perfused, L upper arm incision c/d/i, palpable pulses in UEs    Tubes/Lines/Drains   [x] Peripheral IV  [] Central Venous Line     	[] R	[] L	[] IJ	[] Fem	[] SC	  [] Arterial Line		[] R	[] L	[] Fem	[] Rad	[] Ax	  [] PICC		[] Midline		[] Mediport  [] Urinary Catheter		  [x] Necessity of urinary, arterial, and venous catheters discussed    LABS  --------------------------------------------------------------------------------------  12-26    137  |  105  |  47<H>  ----------------------------<  120<H>  3.3<L>   |  18<L>  |  2.25<H>    Ca    7.6<L>      26 Dec 2023 01:05  Phos  2.6     12-26  Mg     1.90     12-26    TPro  5.6<L>  /  Alb  2.6<L>  /  TBili  0.4  /  DBili  0.2  /  AST  30  /  ALT  16  /  AlkPhos  50  12-26    CBC Full  -  ( 26 Dec 2023 01:05 )  WBC Count : 11.88 K/uL  RBC Count : 2.56 M/uL  Hemoglobin : 7.7 g/dL  Hematocrit : 22.3 %  Platelet Count - Automated : 184 K/uL  Mean Cell Volume : 87.1 fL  Mean Cell Hemoglobin : 30.1 pg  Mean Cell Hemoglobin Concentration : 34.5 gm/dL  Auto Neutrophil # : x  Auto Lymphocyte # : x  Auto Monocyte # : x  Auto Eosinophil # : x  Auto Basophil # : x  Auto Neutrophil % : x  Auto Lymphocyte % : x  Auto Monocyte % : x  Auto Eosinophil % : x  Auto Basophil % : x    --------------------------------------------------------------------------------------    IMAGING STUDIES:        SICU Daily Progress Note  =====================================================  Interval events:  -IR for cholecystitis-> trial abx   -CLD  -PO meds  -F/u LFTs  -Moss out, a-line out  -Serial abdominal exams      Allergies: fluoroquinolone antibiotics (Other)  Cipro (Unknown)  Tegretol (Unknown)  carbamazepine (Other)      MEDICATIONS:   --------------------------------------------------------------------------------------  Neurologic Medications  acetaminophen   IVPB .. 1000 milliGRAM(s) IV Intermittent every 6 hours  aspirin Suppository 300 milliGRAM(s) Rectal daily  levETIRAcetam   Injectable 250 milliGRAM(s) IV Push every 12 hours    Respiratory Medications    Cardiovascular Medications  metoprolol tartrate Injectable 2.5 milliGRAM(s) IV Push every 6 hours    Gastrointestinal Medications  dextrose 5% + lactated ringers. 1000 milliLiter(s) IV Continuous <Continuous>  dextrose 5%. 1000 milliLiter(s) IV Continuous <Continuous>  dextrose 5%. 1000 milliLiter(s) IV Continuous <Continuous>    Genitourinary Medications    Hematologic/Oncologic Medications  heparin   Injectable 5000 Unit(s) SubCutaneous every 8 hours  ticagrelor 60 milliGRAM(s) Oral every 12 hours    Antimicrobial/Immunologic Medications  piperacillin/tazobactam IVPB.. 3.375 Gram(s) IV Intermittent every 12 hours    Endocrine/Metabolic Medications  dextrose 50% Injectable 25 Gram(s) IV Push once  dextrose 50% Injectable 25 Gram(s) IV Push once  dextrose 50% Injectable 12.5 Gram(s) IV Push once  dextrose Oral Gel 15 Gram(s) Oral once PRN Blood Glucose LESS THAN 70 milliGRAM(s)/deciliter  glucagon  Injectable 1 milliGRAM(s) IntraMuscular once  insulin glargine Injectable (LANTUS) 20 Unit(s) SubCutaneous at bedtime  insulin lispro (ADMELOG) corrective regimen sliding scale   SubCutaneous every 6 hours    Topical/Other Medications  chlorhexidine 2% Cloths 1 Application(s) Topical daily    --------------------------------------------------------------------------------------    VITAL SIGNS, INS/OUTS (last 24 hours):  --------------------------------------------------------------------------------------  ICU Vital Signs Last 24 Hrs  T(C): 36 (27 Dec 2023 00:00), Max: 37.1 (26 Dec 2023 16:00)  T(F): 96.8 (27 Dec 2023 00:00), Max: 98.7 (26 Dec 2023 16:00)  HR: 86 (27 Dec 2023 00:00) (82 - 92)  BP: --  BP(mean): --  ABP: 150/49 (27 Dec 2023 00:00) (133/44 - 172/65)  ABP(mean): 88 (27 Dec 2023 00:00) (77 - 107)  RR: 21 (27 Dec 2023 00:00) (16 - 29)  SpO2: 96% (27 Dec 2023 00:00) (95% - 99%)    O2 Parameters below as of 27 Dec 2023 00:00  Patient On (Oxygen Delivery Method): room air        I&O's Summary    25 Dec 2023 07:01  -  26 Dec 2023 07:00  --------------------------------------------------------  IN: 3300 mL / OUT: 1670 mL / NET: 1630 mL    26 Dec 2023 07:01  -  27 Dec 2023 00:33  --------------------------------------------------------  IN: 1700 mL / OUT: 660 mL / NET: 1040 mL      --------------------------------------------------------------------------------------    EXAM    Gen: NAD  Resp: no increased WOB, satting well on RA  Cardiac: appears well perfused, extremities warm  Abd: soft, distended, incision sites with serosanguinous strikethrough   Extremities: warm, well perfused, L upper arm incision c/d/i, palpable pulses in UEs    Tubes/Lines/Drains   [x] Peripheral IV  [] Central Venous Line     	[] R	[] L	[] IJ	[] Fem	[] SC	  [] Arterial Line		[] R	[] L	[] Fem	[] Rad	[] Ax	  [] PICC		[] Midline		[] Mediport  [] Urinary Catheter		  [x] Necessity of urinary, arterial, and venous catheters discussed    LABS  --------------------------------------------------------------------------------------  12-26    137  |  105  |  47<H>  ----------------------------<  120<H>  3.3<L>   |  18<L>  |  2.25<H>    Ca    7.6<L>      26 Dec 2023 01:05  Phos  2.6     12-26  Mg     1.90     12-26    TPro  5.6<L>  /  Alb  2.6<L>  /  TBili  0.4  /  DBili  0.2  /  AST  30  /  ALT  16  /  AlkPhos  50  12-26    CBC Full  -  ( 26 Dec 2023 01:05 )  WBC Count : 11.88 K/uL  RBC Count : 2.56 M/uL  Hemoglobin : 7.7 g/dL  Hematocrit : 22.3 %  Platelet Count - Automated : 184 K/uL  Mean Cell Volume : 87.1 fL  Mean Cell Hemoglobin : 30.1 pg  Mean Cell Hemoglobin Concentration : 34.5 gm/dL  Auto Neutrophil # : x  Auto Lymphocyte # : x  Auto Monocyte # : x  Auto Eosinophil # : x  Auto Basophil # : x  Auto Neutrophil % : x  Auto Lymphocyte % : x  Auto Monocyte % : x  Auto Eosinophil % : x  Auto Basophil % : x    --------------------------------------------------------------------------------------    IMAGING STUDIES:

## 2023-12-27 NOTE — PROGRESS NOTE ADULT - ASSESSMENT
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 CTA demonstrating mesenteric fat stranding associated with ascending/transverse colon. S/p SMA angiography with stent placement with diagnostic laparoscopy 12/24, small bowel and visible colon viable, some inflammation of omentum in RUQ. 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 CTA demonstrating mesenteric fat stranding associated with ascending/transverse colon. S/p SMA angiography with stent placement with diagnostic laparoscopy 12/24, small bowel and visible colon viable, some inflammation of omentum in RUQ. NGT out, now with return of bowl function     - Advance diet as tolerated  - Pain control   - Appreciate excellent care per SICU and primary   - Please re-page as needed    B Team Surgery   [id number]

## 2023-12-27 NOTE — PROGRESS NOTE ADULT - SUBJECTIVE AND OBJECTIVE BOX
SURGERY PROGRESS NOTE    SUBJECTIVE / 24H EVENTS:  Patient seen and examined on morning rounds. NGT self dc'ed overnight, had 2 BM      OBJECTIVE:  VITAL SIGNS:  T(C): 36.7 (23 @ 04:00), Max: 37.1 (23 @ 16:00)  HR: 89 (23 @ 09:45) (83 - 91)  BP: 158/63 (23 @ 08:00) (154/57 - 181/64)  RR: 21 (23 @ 09:45) (16 - 29)  SpO2: 100% (23 @ 09:45) (95% - 100%)  Daily     Daily Weight in k.7 (27 Dec 2023 03:00)  POCT Blood Glucose.: 135 mg/dL (23 @ 05:43)  POCT Blood Glucose.: 98 mg/dL (23 @ 23:18)  POCT Blood Glucose.: 78 mg/dL (23 @ 22:04)      PHYSICAL EXAM:  Gen: NAD  LS: Respirations unlabored on RA  GI: Softly distended, nontender on exam  Ext: Warm, well perfused      23 @ 07:01  -  23 @ 07:00  --------------------------------------------------------  IN:    dextrose 5% + lactated ringers: 800 mL    IV PiggyBack: 1000 mL    Lactated Ringers: 1400 mL  Total IN: 3200 mL    OUT:    Indwelling Catheter - Urethral (mL): 1190 mL  Total OUT: 1190 mL    Total NET: 23 @ 07:01  -  23 @ 11:00  --------------------------------------------------------  IN:  Total IN: 0 mL    OUT:    Indwelling Catheter - Urethral (mL): 60 mL  Total OUT: 60 mL    Total NET: -60 mL          LAB VALUES:      139  |  105  |  42<H>  ----------------------------<  89  3.5   |  19<L>  |  2.00<H>    Ca    7.6<L>      27 Dec 2023 00:36  Phos  3.3       Mg     1.90         TPro  5.6<L>  /  Alb  2.6<L>  /  TBili  0.4  /  DBili  0.2  /  AST  30  /  ALT  16  /  AlkPhos  50                                 7.6    14.97 )-----------( 233      ( 27 Dec 2023 00:36 )             20.7     LIVER FUNCTIONS - ( 26 Dec 2023 04:05 )  Alb: 2.6 g/dL / Pro: 5.6 g/dL / ALK PHOS: 50 U/L / ALT: 16 U/L / AST: 30 U/L / GGT: x           PT/INR - ( 25 Dec 2023 12:15 )   PT: 12.9 sec;   INR: 1.15 ratio         PTT - ( 25 Dec 2023 12:15 )  PTT:32.3 sec    CARDIAC MARKERS ( 26 Dec 2023 04:05 )  x     / x     / 116 U/L / x     / 2.4 ng/mL      Urinalysis Basic - ( 27 Dec 2023 00:36 )    Color: x / Appearance: x / SG: x / pH: x  Gluc: 89 mg/dL / Ketone: x  / Bili: x / Urobili: x   Blood: x / Protein: x / Nitrite: x   Leuk Esterase: x / RBC: x / WBC x   Sq Epi: x / Non Sq Epi: x / Bacteria: x        RADIOLOGY:  < from: US Abdomen Upper Quadrant Right (12.26.23 @ 14:21) >  IMPRESSION:  Limited exam.    Distended gallbladder with cholelithiasis and sludge, and top normal wall   thickness. No focal pain was elicited over the gallbladder during the   exam. If there remains concern for acute cholecystitis, consider further   evaluation with HIDA scan.    < end of copied text >        MEDICATIONS  (STANDING):  aspirin  chewable 81 milliGRAM(s) Oral daily  atorvastatin 80 milliGRAM(s) Oral at bedtime  chlorhexidine 2% Cloths 1 Application(s) Topical daily  dextrose 5% + lactated ringers. 1000 milliLiter(s) (100 mL/Hr) IV Continuous <Continuous>  dextrose 5%. 1000 milliLiter(s) (100 mL/Hr) IV Continuous <Continuous>  dextrose 5%. 1000 milliLiter(s) (50 mL/Hr) IV Continuous <Continuous>  dextrose 50% Injectable 25 Gram(s) IV Push once  dextrose 50% Injectable 25 Gram(s) IV Push once  dextrose 50% Injectable 12.5 Gram(s) IV Push once  escitalopram 10 milliGRAM(s) Oral daily  glucagon  Injectable 1 milliGRAM(s) IntraMuscular once  heparin   Injectable 5000 Unit(s) SubCutaneous every 8 hours  insulin glargine Injectable (LANTUS) 20 Unit(s) SubCutaneous at bedtime  insulin lispro (ADMELOG) corrective regimen sliding scale   SubCutaneous Before meals and at bedtime  levETIRAcetam 250 milliGRAM(s) Oral two times a day  memantine 5 milliGRAM(s) Oral two times a day  metoprolol tartrate 25 milliGRAM(s) Oral two times a day  piperacillin/tazobactam IVPB.. 3.375 Gram(s) IV Intermittent every 12 hours  ranolazine 500 milliGRAM(s) Oral two times a day  ticagrelor 60 milliGRAM(s) Oral every 12 hours    MEDICATIONS  (PRN):  dextrose Oral Gel 15 Gram(s) Oral once PRN Blood Glucose LESS THAN 70 milliGRAM(s)/deciliter

## 2023-12-27 NOTE — PROGRESS NOTE ADULT - SUBJECTIVE AND OBJECTIVE BOX
Aashish Boyer MD  Interventional Cardiology / Advance Heart Failure and Cardiac Transplant Specialist  [place] Office : [address] [place] [id number]  Tel:   [place] Office : [address] [facility] [address] [address]  Tel: [phone]      Subjective/Overnight events: Pt is lying in bed  not in distress  	  MEDICATIONS:  aspirin  chewable 81 milliGRAM(s) Oral daily  heparin   Injectable 5000 Unit(s) SubCutaneous every 8 hours  metoprolol tartrate 25 milliGRAM(s) Oral two times a day  ranolazine 500 milliGRAM(s) Oral two times a day  ticagrelor 60 milliGRAM(s) Oral every 12 hours    piperacillin/tazobactam IVPB.. 3.375 Gram(s) IV Intermittent every 12 hours      escitalopram 10 milliGRAM(s) Oral daily  levETIRAcetam 250 milliGRAM(s) Oral two times a day  memantine 5 milliGRAM(s) Oral two times a day      atorvastatin 80 milliGRAM(s) Oral at bedtime  dextrose 50% Injectable 25 Gram(s) IV Push once  dextrose 50% Injectable 12.5 Gram(s) IV Push once  dextrose 50% Injectable 25 Gram(s) IV Push once  dextrose Oral Gel 15 Gram(s) Oral once PRN  glucagon  Injectable 1 milliGRAM(s) IntraMuscular once  insulin glargine Injectable (LANTUS) 20 Unit(s) SubCutaneous at bedtime  insulin lispro (ADMELOG) corrective regimen sliding scale   SubCutaneous Before meals and at bedtime    chlorhexidine 2% Cloths 1 Application(s) Topical daily  dextrose 5% + lactated ringers. 1000 milliLiter(s) IV Continuous <Continuous>  dextrose 5%. 1000 milliLiter(s) IV Continuous <Continuous>  dextrose 5%. 1000 milliLiter(s) IV Continuous <Continuous>      PAST MEDICAL/SURGICAL HISTORY  PAST MEDICAL & SURGICAL HISTORY:  Hyperlipemia      Hypertension      Coronary Artery Disease      Diabetes Mellitus Type II      Stented Coronary Artery  total 5 stents, last stent 5/2019      Neuropathy      Myocardial infarction      Stroke  mild left facial numbness   no other residuals verbalized      Myoclonic jerking      Stage 3 chronic kidney disease      History of Cataract Extraction      Hx of CABG      H/O coronary angiogram      S/P coronary artery stent placement  1/6/09      S/P placement of cardiac pacemaker          SOCIAL HISTORY: Substance Use (street drugs): ( x ) never used  (  ) other:    FAMILY HISTORY:  No pertinent family history in first degree relatives          PHYSICAL EXAM:  T(C): 37.7 (12-27-23 @ 12:00), Max: 37.7 (12-27-23 @ 12:00)  HR: 87 (12-27-23 @ 13:00) (83 - 91)  BP: 166/88 (12-27-23 @ 12:00) (154/57 - 181/64)  RR: 27 (12-27-23 @ 13:00) (16 - 29)  SpO2: 98% (12-27-23 @ 13:00) (95% - 100%)  Wt(kg): --  I&O's Summary    26 Dec 2023 07:01  -  27 Dec 2023 07:00  --------------------------------------------------------  IN: 3200 mL / OUT: 1190 mL / NET: 2010 mL    27 Dec 2023 07:01  -  27 Dec 2023 14:09  --------------------------------------------------------  IN: 0 mL / OUT: 190 mL / NET: -190 mL        GENERAL: confused   EYES: conjunctiva and sclera clear  ENMT: No tonsillar erythema, exudates, or enlargement  Cardiovascular: Normal S1 S2, No JVD, 2/6 systolic murmur   Respiratory: basal creps + 	  Gastrointestinal:  s/p laprotomy and SMA stenting  	  Extremities: No edema                                        7.8    18.15 )-----------( 254      ( 27 Dec 2023 12:43 )             22.4     12-27    137  |  104  |  40<H>  ----------------------------<  237<H>  4.1   |  19<L>  |  1.89<H>    Ca    8.1<L>      27 Dec 2023 12:43  Phos  3.2     12-27  Mg     2.10     12-27    TPro  6.3  /  Alb  2.9<L>  /  TBili  0.8  /  DBili  x   /  AST  24  /  ALT  13  /  AlkPhos  65  12-27    proBNP:   Lipid Profile:   HgA1c:   TSH:     Consultant(s) Notes Reviewed:  [x ] YES  [ ] NO    Care Discussed with Consultants/Other Providers [ x] YES  [ ] NO    Imaging Personally Reviewed independently:  [x] YES  [ ] NO    All labs, radiologic studies, vitals, orders and medications list reviewed. Patient is seen and examined at bedside. Case discussed with medical team.         Aashish Boyer MD  Interventional Cardiology / Advance Heart Failure and Cardiac Transplant Specialist  [place] Office : [address] [place] [address]  Tel:   [place] Office : [address] [facility] [address] [address]  Tel: [phone]      Subjective/Overnight events: Pt is lying in bed  not in distress  	  MEDICATIONS:  aspirin  chewable 81 milliGRAM(s) Oral daily  heparin   Injectable 5000 Unit(s) SubCutaneous every 8 hours  metoprolol tartrate 25 milliGRAM(s) Oral two times a day  ranolazine 500 milliGRAM(s) Oral two times a day  ticagrelor 60 milliGRAM(s) Oral every 12 hours    piperacillin/tazobactam IVPB.. 3.375 Gram(s) IV Intermittent every 12 hours      escitalopram 10 milliGRAM(s) Oral daily  levETIRAcetam 250 milliGRAM(s) Oral two times a day  memantine 5 milliGRAM(s) Oral two times a day      atorvastatin 80 milliGRAM(s) Oral at bedtime  dextrose 50% Injectable 25 Gram(s) IV Push once  dextrose 50% Injectable 12.5 Gram(s) IV Push once  dextrose 50% Injectable 25 Gram(s) IV Push once  dextrose Oral Gel 15 Gram(s) Oral once PRN  glucagon  Injectable 1 milliGRAM(s) IntraMuscular once  insulin glargine Injectable (LANTUS) 20 Unit(s) SubCutaneous at bedtime  insulin lispro (ADMELOG) corrective regimen sliding scale   SubCutaneous Before meals and at bedtime    chlorhexidine 2% Cloths 1 Application(s) Topical daily  dextrose 5% + lactated ringers. 1000 milliLiter(s) IV Continuous <Continuous>  dextrose 5%. 1000 milliLiter(s) IV Continuous <Continuous>  dextrose 5%. 1000 milliLiter(s) IV Continuous <Continuous>      PAST MEDICAL/SURGICAL HISTORY  PAST MEDICAL & SURGICAL HISTORY:  Hyperlipemia      Hypertension      Coronary Artery Disease      Diabetes Mellitus Type II      Stented Coronary Artery  total 5 stents, last stent 5/2019      Neuropathy      Myocardial infarction      Stroke  mild left facial numbness   no other residuals verbalized      Myoclonic jerking      Stage 3 chronic kidney disease      History of Cataract Extraction      Hx of CABG      H/O coronary angiogram      S/P coronary artery stent placement  1/6/09      S/P placement of cardiac pacemaker          SOCIAL HISTORY: Substance Use (street drugs): ( x ) never used  (  ) other:    FAMILY HISTORY:  No pertinent family history in first degree relatives          PHYSICAL EXAM:  T(C): 37.7 (12-27-23 @ 12:00), Max: 37.7 (12-27-23 @ 12:00)  HR: 87 (12-27-23 @ 13:00) (83 - 91)  BP: 166/88 (12-27-23 @ 12:00) (154/57 - 181/64)  RR: 27 (12-27-23 @ 13:00) (16 - 29)  SpO2: 98% (12-27-23 @ 13:00) (95% - 100%)  Wt(kg): --  I&O's Summary    26 Dec 2023 07:01  -  27 Dec 2023 07:00  --------------------------------------------------------  IN: 3200 mL / OUT: 1190 mL / NET: 2010 mL    27 Dec 2023 07:01  -  27 Dec 2023 14:09  --------------------------------------------------------  IN: 0 mL / OUT: 190 mL / NET: -190 mL        GENERAL: confused   EYES: conjunctiva and sclera clear  ENMT: No tonsillar erythema, exudates, or enlargement  Cardiovascular: Normal S1 S2, No JVD, 2/6 systolic murmur   Respiratory: basal creps + 	  Gastrointestinal:  s/p laprotomy and SMA stenting  	  Extremities: No edema                                        7.8    18.15 )-----------( 254      ( 27 Dec 2023 12:43 )             22.4     12-27    137  |  104  |  40<H>  ----------------------------<  237<H>  4.1   |  19<L>  |  1.89<H>    Ca    8.1<L>      27 Dec 2023 12:43  Phos  3.2     12-27  Mg     2.10     12-27    TPro  6.3  /  Alb  2.9<L>  /  TBili  0.8  /  DBili  x   /  AST  24  /  ALT  13  /  AlkPhos  65  12-27    proBNP:   Lipid Profile:   HgA1c:   TSH:     Consultant(s) Notes Reviewed:  [x ] YES  [ ] NO    Care Discussed with Consultants/Other Providers [ x] YES  [ ] NO    Imaging Personally Reviewed independently:  [x] YES  [ ] NO    All labs, radiologic studies, vitals, orders and medications list reviewed. Patient is seen and examined at bedside. Case discussed with medical team.

## 2023-12-27 NOTE — PROGRESS NOTE ADULT - SUBJECTIVE AND OBJECTIVE BOX
Subjective and Objective:     Feels better this morning. No numbness of left arm. Denies weakness.       HOME MEDICATIONS:     CURRENT MEDICATIONS  MEDICATIONS (STANDING): aspirin Suppository 300 milliGRAM(s) Rectal daily  dextrose 5% + lactated ringers. 1000 milliLiter(s) IV Continuous <Continuous>  dextrose 5%. 1000 milliLiter(s) IV Continuous <Continuous>  dextrose 5%. 1000 milliLiter(s) IV Continuous <Continuous>  dextrose 50% Injectable 25 Gram(s) IV Push once  dextrose 50% Injectable 12.5 Gram(s) IV Push once  dextrose 50% Injectable 25 Gram(s) IV Push once  glucagon  Injectable 1 milliGRAM(s) IntraMuscular once  heparin   Injectable 5000 Unit(s) SubCutaneous every 8 hours  insulin glargine Injectable (LANTUS) 20 Unit(s) SubCutaneous at bedtime  insulin lispro (ADMELOG) corrective regimen sliding scale   SubCutaneous Before meals and at bedtime  levETIRAcetam   Injectable 250 milliGRAM(s) IV Push every 12 hours  metoprolol tartrate Injectable 5 milliGRAM(s) IV Push every 6 hours  piperacillin/tazobactam IVPB.. 3.375 Gram(s) IV Intermittent every 12 hours  ticagrelor 60 milliGRAM(s) Oral every 12 hours    MEDICATIONS (PRN):dextrose Oral Gel 15 Gram(s) Oral once PRN Blood Glucose LESS THAN 70 milliGRAM(s)/deciliter    --------------------------------------------------------------------------------------------    Vitals:   T(C): 36.7 (12-27-23 @ 04:00), Max: 37.1 (12-26-23 @ 16:00)  HR: 87 (12-27-23 @ 08:00) (83 - 91)  BP: 158/63 (12-27-23 @ 08:00) (154/57 - 181/64)  RR: 24 (12-27-23 @ 08:00) (16 - 29)  SpO2: 100% (12-27-23 @ 08:00) (95% - 100%)  CAPILLARY BLOOD GLUCOSE      POCT Blood Glucose.: 135 mg/dL (27 Dec 2023 05:43)  POCT Blood Glucose.: 98 mg/dL (26 Dec 2023 23:18)  POCT Blood Glucose.: 78 mg/dL (26 Dec 2023 22:04)  POCT Blood Glucose.: 90 mg/dL (26 Dec 2023 17:46)  POCT Blood Glucose.: 103 mg/dL (26 Dec 2023 12:24)    CAPILLARY BLOOD GLUCOSE      POCT Blood Glucose.: 135 mg/dL (27 Dec 2023 05:43)  POCT Blood Glucose.: 98 mg/dL (26 Dec 2023 23:18)  POCT Blood Glucose.: 78 mg/dL (26 Dec 2023 22:04)  POCT Blood Glucose.: 90 mg/dL (26 Dec 2023 17:46)  POCT Blood Glucose.: 103 mg/dL (26 Dec 2023 12:24)      12-26 @ 07:01  -  12-27 @ 07:00  --------------------------------------------------------  IN:    dextrose 5% + lactated ringers: 800 mL    IV PiggyBack: 1000 mL    Lactated Ringers: 1400 mL  Total IN: 3200 mL    OUT:    Indwelling Catheter - Urethral (mL): 1190 mL  Total OUT: 1190 mL    Total NET: 2010 mL        Height (cm): 175.3 (12-24 @ 01:02)  Weight (kg): 79.379 (12-24 @ 01:02)  BMI (kg/m2): 25.8 (12-24 @ 01:02)  BSA (m2): 1.95 (12-24 @ 01:02)    Physical Exam  Constitutional: A&O, NAD  Gastrointestinal: Soft nontender, mildly distended, port sites c/d/i  Extremities: palpable radial pulse left  Skin: No Rashes, No Hematoma, Ecchymosis in upper arm  LABS:  --------------------------------------------------------------------------------------------    LABS  CBC (12-27 @ 00:36)                              7.6<L>                         14.97<H>  )----------------(  233        --    % Neutrophils, --    % Lymphocytes, ANC: --                                  20.7<LL>  CBC (12-26 @ 01:05)                              7.7<L>                         11.88<H>  )----------------(  184        --    % Neutrophils, --    % Lymphocytes, ANC: --                                  22.3<L>    BMP (12-27 @ 00:36)             139     |  105     |  42<H> 		Ca++ --      Ca 7.6<L>             ---------------------------------( 89    		Mg 1.90               3.5     |  19<L>   |  2.00<H>			Ph 3.3     BMP (12-26 @ 01:05)             137     |  105     |  47<H> 		Ca++ --      Ca 7.6<L>             ---------------------------------( 120<H>		Mg 1.90               3.3<L>  |  18<L>   |  2.25<H>			Ph 2.6       LFTs (12-26 @ 04:05)      TPro 5.6<L> / Alb 2.6<L> / TBili 0.4 / DBili 0.2 / AST 30 / ALT 16 / AlkPhos 50      Cardiac Markers (12-26 @ 04:05)     HSTrop: -- / CKMB: -- / CK: 116        --------------------------------------------------------------------------------------------    MICROBIOLOGY  Urinalysis (12-27 @ 00:36):     Color:  / Appearance:  / SG:  / pH:  / Gluc: 89 / Ketones:  / Bili:  / Urobili:  / Protein : / Nitrites:  / Leuk.Est:  / RBC:  / WBC:  / Sq Epi:  / Non Sq Epi:  / Bacteria        -> .Blood Blood-Peripheral Culture (12-24 @ 06:45)     NG    NG    No growth at 48 Hours    -> .Blood Blood-Peripheral Culture (12-24 @ 06:37)     NG    NG    No growth at 48 Hours      --------------------------------------------------------------------------------------------    IMAGING

## 2023-12-27 NOTE — CHART NOTE - NSCHARTNOTEFT_GEN_A_CORE
IR initially consulted for percutaneous cholecystostomy for questionable acute cholecystitis. CT and US are equivocal but not-diagnostic. Discussed with SICU team today - SICU currently not requesting a perc. asa and will cancel consult. IR can be re-consulted for further needs.    Aashish Fung MD PGY-3  Interventional Radiology  IR Pager: [phone]  Available on Teams IR initially consulted for percutaneous cholecystostomy for questionable acute cholecystitis. CT and US are equivocal but not-diagnostic. Discussed with SICU team today - SICU currently not requesting a perc. asa and will cancel consult. IR can be re-consulted for further needs.    Aashish Fung MD PGY-3  Interventional Radiology  IR Pager: [phone]  Available on Teams

## 2023-12-27 NOTE — PROGRESS NOTE ADULT - ATTENDING COMMENTS
89 year old man s/p SMA stent for ischemic colitis  no abdominal or left upper extremity symptoms  benign exam  trend labs  advance diet

## 2023-12-27 NOTE — PROGRESS NOTE ADULT - ATTENDING COMMENTS
I agree with the detailed interval history, physical, and plan, which I have reviewed and edited where appropriate'; also agree with notes/assessment with my team on service.  I have personally examined the patient.  I was physically present for the key portions of the evaluation and management (E/M) service provided.  I reviewed all the pertinent data.  The patient is a critical care patient with life threatening hemodynamic and metabolic instability in SICU.  The SICU team has a constant risk benefit analyzes discussion and coordinating care with the primary team and all consultants.   The patient is in SICU with the chief complaint and diagnosis mentioned in the note.   The plan will be specified in the note.  89M s/p diagnostic laparoscopy and SMA stent placement with brachial cutdown in SICU for HD monitoring.   PHYSICAL EXAM:  Gen: NAD  Resp: clear  Cardiac: RR  Abd: soft, distended  Extremities: warm  PLAN:   Neurologic:  - Keppra  - Tylenol  Respiratory:  -Maintain O2 saturation >92%  -RA  Cardiovascular:  -MAP goal >65  Gastrointestinal/Nutrition:  -Advance Diet   Genitourinary/Renal:  -dc Moss   Hematologic:  - SQH   - ASA   Infectious Disease:  -Zosyn   Endocrine:  -ISS  -Lantus  -Monitor glucose   Disposition: dc floor I agree with the detailed interval history, physical, and plan, which I have reviewed and edited where appropriate'; also agree with notes/assessment with my team on service.  I have personally examined the patient.  I was physically present for the key portions of the evaluation and management (E/M) service provided.  I reviewed all the pertinent data.  The patient is a critical care patient with life threatening hemodynamic and metabolic instability in SICU.  The SICU team has a constant risk benefit analyzes discussion and coordinating care with the primary team and all consultants.   The patient is in SICU with the chief complaint and diagnosis mentioned in the note.   The plan will be specified in the note.  89M s/p diagnostic laparoscopy and SMA stent placement with brachial cutdown in SICU for HD monitoring.   PHYSICAL EXAM:  Gen: NAD  Resp: clear  Cardiac: RR  Abd: soft, distended  Extremities: warm  PLAN:   Neurologic:  - Keppra  - Tylenol  Respiratory:  -Maintain O2 saturation >92%  -RA  Cardiovascular:  -MAP goal >65  Gastrointestinal/Nutrition:  -Advance Diet   Genitourinary/Renal:  -dc Moss   Hematologic:  - SQH   - ASA   Infectious Disease:  -Zosyn   Endocrine:  -ISS  -Lantus  -Monitor glucose   Disposition: dc floor.

## 2023-12-27 NOTE — PROGRESS NOTE ADULT - ASSESSMENT
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) who presents to the hospital for COVID19 infection and chest pain  Abd distention   MABLE        1 Renal - Creatinine slightly lower, on IVF  S/p CT with contrast   A/w KCL 10 mEq IV x 3   2 CV - BP elevated, s/p labetalol 10 IV and lopressor 5mg IV overnight, now on lopressor 25 mg po bid  If possible start Norvasc   2 Vasc - s/p SMA stent placement; now on clear liquid diet   3 -Moss to gravity       Sayed [facility]   [phone]    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

## 2023-12-27 NOTE — PROGRESS NOTE ADULT - SUBJECTIVE AND OBJECTIVE BOX
NEPHROLOGY    Patient seen and examined with family at bedside, pt working with PT, no sob, comfortable on 2L NC, in no acute distress.     MEDICATIONS  (STANDING):  aspirin  chewable 81 milliGRAM(s) Oral daily  atorvastatin 80 milliGRAM(s) Oral at bedtime  chlorhexidine 2% Cloths 1 Application(s) Topical daily  dextrose 5% + lactated ringers. 1000 milliLiter(s) (100 mL/Hr) IV Continuous <Continuous>  dextrose 5%. 1000 milliLiter(s) (100 mL/Hr) IV Continuous <Continuous>  dextrose 5%. 1000 milliLiter(s) (50 mL/Hr) IV Continuous <Continuous>  dextrose 50% Injectable 25 Gram(s) IV Push once  dextrose 50% Injectable 25 Gram(s) IV Push once  dextrose 50% Injectable 12.5 Gram(s) IV Push once  escitalopram 10 milliGRAM(s) Oral daily  glucagon  Injectable 1 milliGRAM(s) IntraMuscular once  heparin   Injectable 5000 Unit(s) SubCutaneous every 8 hours  insulin glargine Injectable (LANTUS) 20 Unit(s) SubCutaneous at bedtime  insulin lispro (ADMELOG) corrective regimen sliding scale   SubCutaneous Before meals and at bedtime  levETIRAcetam 250 milliGRAM(s) Oral two times a day  memantine 5 milliGRAM(s) Oral two times a day  metoprolol tartrate 25 milliGRAM(s) Oral two times a day  piperacillin/tazobactam IVPB.. 3.375 Gram(s) IV Intermittent every 12 hours  ranolazine 500 milliGRAM(s) Oral two times a day  ticagrelor 60 milliGRAM(s) Oral every 12 hours    VITALS:  T(C): , Max: 37.1 (12-26-23 @ 16:00)  T(F): , Max: 98.7 (12-26-23 @ 16:00)  HR: 89 (12-27-23 @ 09:45)  BP: 158/63 (12-27-23 @ 08:00)  BP(mean): 92 (12-27-23 @ 08:00)  RR: 21 (12-27-23 @ 09:45)  SpO2: 100% (12-27-23 @ 09:45)    I and O's:    12-26 @ 07:01  -  12-27 @ 07:00  --------------------------------------------------------  IN: 3200 mL / OUT: 1190 mL / NET: 2010 mL    12-27 @ 07:01  -  12-27 @ 11:32  --------------------------------------------------------  IN: 0 mL / OUT: 60 mL / NET: -60 mL    PHYSICAL EXAM:  Constitutional: NAD  HEENT: EOMI  Neck:  No JVD, supple   Respiratory: CTA B/L  Cardiovascular: S1 and S2, RRR  Gastrointestinal: + BS   Extremities: No peripheral edema, + peripheral pulses  Neurological: limited   Psychiatric: Normal mood, normal affect  : + Moss  Skin: No rashes, C/D/I    LABS:                        7.6    14.97 )-----------( 233      ( 27 Dec 2023 00:36 )             20.7     12-27    139  |  105  |  42<H>  ----------------------------<  89  3.5   |  19<L>  |  2.00<H>    Ca    7.6<L>      27 Dec 2023 00:36  Phos  3.3     12-27  Mg     1.90     12-27    TPro  5.6<L>  /  Alb  2.6<L>  /  TBili  0.4  /  DBili  0.2  /  AST  30  /  ALT  16  /  AlkPhos  50  12-26    RADIOLOGY & ADDITIONAL STUDIES:  < from: US Abdomen Upper Quadrant Right (12.26.23 @ 14:21) >    IMPRESSION:  Limited exam.    Distended gallbladder with cholelithiasis and sludge, and top normal wall   thickness. No focal pain was elicited over the gallbladder during the   exam. If there remains concern for acute cholecystitis, consider further   evaluation with HIDA scan.    --- End of Report ---        THAO HURST MD; Attending Radiologist  This document has been electronically signed. 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     MEDICATIONS  (STANDING):  aspirin  chewable 81 milliGRAM(s) Oral daily  atorvastatin 80 milliGRAM(s) Oral at bedtime  chlorhexidine 2% Cloths 1 Application(s) Topical daily  dextrose 5% + lactated ringers. 1000 milliLiter(s) (100 mL/Hr) IV Continuous <Continuous>  dextrose 5%. 1000 milliLiter(s) (100 mL/Hr) IV Continuous <Continuous>  dextrose 5%. 1000 milliLiter(s) (50 mL/Hr) IV Continuous <Continuous>  dextrose 50% Injectable 25 Gram(s) IV Push once  dextrose 50% Injectable 25 Gram(s) IV Push once  dextrose 50% Injectable 12.5 Gram(s) IV Push once  escitalopram 10 milliGRAM(s) Oral daily  glucagon  Injectable 1 milliGRAM(s) IntraMuscular once.  heparin   Injectable 5000 Unit(s) SubCutaneous every 8 hours  insulin glargine Injectable (LANTUS) 20 Unit(s) SubCutaneous at bedtime  insulin lispro (ADMELOG) corrective regimen sliding scale   SubCutaneous Before meals and at bedtime  levETIRAcetam 250 milliGRAM(s) Oral two times a day  memantine 5 milliGRAM(s) Oral two times a day  metoprolol tartrate 25 milliGRAM(s) Oral two times a day  piperacillin/tazobactam IVPB.. 3.375 Gram(s) IV Intermittent every 12 hours  ranolazine 500 milliGRAM(s) Oral two times a day  ticagrelor 60 milliGRAM(s) Oral every 12 hours    VITALS:  T(C): , Max: 37.1 (12-26-23 @ 16:00)  T(F): , Max: 98.7 (12-26-23 @ 16:00).  HR: 89 (12-27-23 @ 09:45)  BP: 158/63 (12-27-23 @ 08:00)  BP(mean): 92 (12-27-23 @ 08:00)  RR: 21 (12-27-23 @ 09:45)  SpO2: 100% (12-27-23 @ 09:45)    I and O's:    12-26 @ 07:01  -  12-27 @ 07:00  --------------------------------------------------------  IN: 3200 mL / OUT: 1190 mL / NET: 2010 mL    12-27 @ 07:01  - 12-27 @ 11:32  --------------------------------------------------------  IN: 0 mL / OUT: 60 mL / NET: -60 mL    PHYSICAL EXAM:  Constitutional: NAD.  HEENT: EOMI  Neck:  No JVD, supple   Respiratory: CTA B/L  Cardiovascular: S1 and S2, RRR  Gastrointestinal: + BS   Extremities: No peripheral edema, + peripheral pulses  Neurological: limited   Psychiatric: Normal mood, normal affect  : + Moss  Skin: No rashes, C/D/I    LABS:                        7.6    14.97 )-----------( 233      ( 27 Dec 2023 00:36 )             20.7     12-27    139  |  105  |  42<H>  ----------------------------<  89  3.5   |  19<L>  |  2.00<H>    Ca    7.6<L>      27 Dec 2023 00:36  Phos  3.3     12-27  Mg     1.90     12-27    TPro  5.6<L>  /  Alb  2.6<L>  /  TBili  0.4  /  DBili  0.2  /  AST  30  /  ALT  16  /  AlkPhos  50  12-26    RADIOLOGY & ADDITIONAL STUDIES:  < from: US Abdomen Upper Quadrant Right (12.26.23 @ 14:21) >    IMPRESSION:  Limited exam.    Distended gallbladder with cholelithiasis and sludge, and top normal wall   thickness. No focal pain was elicited over the gallbladder during the   exam. If there remains concern for acute cholecystitis, consider further   evaluation with HIDA scan.    --- End of Report ---        THAO HURST MD; Attending Radiologist  This document has been electronically signed. Dec 26 2023  3:21PM    < end of copied text >

## 2023-12-27 NOTE — PROGRESS NOTE ADULT - ASSESSMENT
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 Patient recovering well     Plan  - ADAT  - Lantus and ISS for diabetes  - Continue keppra for history of myoclonic jerks  - Continue home metoprolol (currently not ordered)  - Restart ASA and Brillinta  -Abx   - care per SICU        C Team  [id number]

## 2023-12-28 LAB
ALBUMIN SERPL ELPH-MCNC: 2.8 G/DL — LOW (ref 3.3–5)
ALBUMIN SERPL ELPH-MCNC: 2.8 G/DL — LOW (ref 3.3–5)
ALP SERPL-CCNC: 58 U/L — SIGNIFICANT CHANGE UP (ref 40–120)
ALP SERPL-CCNC: 58 U/L — SIGNIFICANT CHANGE UP (ref 40–120)
ALT FLD-CCNC: 10 U/L — SIGNIFICANT CHANGE UP (ref 4–41)
ALT FLD-CCNC: 10 U/L — SIGNIFICANT CHANGE UP (ref 4–41)
ANION GAP SERPL CALC-SCNC: 13 MMOL/L — SIGNIFICANT CHANGE UP (ref 7–14)
ANION GAP SERPL CALC-SCNC: 13 MMOL/L — SIGNIFICANT CHANGE UP (ref 7–14)
AST SERPL-CCNC: 23 U/L — SIGNIFICANT CHANGE UP (ref 4–40)
AST SERPL-CCNC: 23 U/L — SIGNIFICANT CHANGE UP (ref 4–40)
BILIRUB SERPL-MCNC: 0.9 MG/DL — SIGNIFICANT CHANGE UP (ref 0.2–1.2)
BILIRUB SERPL-MCNC: 0.9 MG/DL — SIGNIFICANT CHANGE UP (ref 0.2–1.2)
BUN SERPL-MCNC: 35 MG/DL — HIGH (ref 7–23)
BUN SERPL-MCNC: 35 MG/DL — HIGH (ref 7–23)
CALCIUM SERPL-MCNC: 8 MG/DL — LOW (ref 8.4–10.5)
CALCIUM SERPL-MCNC: 8 MG/DL — LOW (ref 8.4–10.5)
CHLORIDE SERPL-SCNC: 105 MMOL/L — SIGNIFICANT CHANGE UP (ref 98–107)
CHLORIDE SERPL-SCNC: 105 MMOL/L — SIGNIFICANT CHANGE UP (ref 98–107)
CO2 SERPL-SCNC: 19 MMOL/L — LOW (ref 22–31)
CO2 SERPL-SCNC: 19 MMOL/L — LOW (ref 22–31)
CREAT SERPL-MCNC: 1.78 MG/DL — HIGH (ref 0.5–1.3)
CREAT SERPL-MCNC: 1.78 MG/DL — HIGH (ref 0.5–1.3)
EGFR: 36 ML/MIN/1.73M2 — LOW
EGFR: 36 ML/MIN/1.73M2 — LOW
GLUCOSE SERPL-MCNC: 212 MG/DL — HIGH (ref 70–99)
GLUCOSE SERPL-MCNC: 212 MG/DL — HIGH (ref 70–99)
HCT VFR BLD CALC: 20.7 % — CRITICAL LOW (ref 39–50)
HCT VFR BLD CALC: 20.7 % — CRITICAL LOW (ref 39–50)
HGB BLD-MCNC: 7.6 G/DL — LOW (ref 13–17)
HGB BLD-MCNC: 7.6 G/DL — LOW (ref 13–17)
MAGNESIUM SERPL-MCNC: 1.9 MG/DL — SIGNIFICANT CHANGE UP (ref 1.6–2.6)
MAGNESIUM SERPL-MCNC: 1.9 MG/DL — SIGNIFICANT CHANGE UP (ref 1.6–2.6)
MCHC RBC-ENTMCNC: 30.9 PG — SIGNIFICANT CHANGE UP (ref 27–34)
MCHC RBC-ENTMCNC: 30.9 PG — SIGNIFICANT CHANGE UP (ref 27–34)
MCHC RBC-ENTMCNC: 36.7 GM/DL — HIGH (ref 32–36)
MCHC RBC-ENTMCNC: 36.7 GM/DL — HIGH (ref 32–36)
MCV RBC AUTO: 84.1 FL — SIGNIFICANT CHANGE UP (ref 80–100)
MCV RBC AUTO: 84.1 FL — SIGNIFICANT CHANGE UP (ref 80–100)
NRBC # BLD: 0 /100 WBCS — SIGNIFICANT CHANGE UP (ref 0–0)
NRBC # BLD: 0 /100 WBCS — SIGNIFICANT CHANGE UP (ref 0–0)
NRBC # FLD: 0 K/UL — SIGNIFICANT CHANGE UP (ref 0–0)
NRBC # FLD: 0 K/UL — SIGNIFICANT CHANGE UP (ref 0–0)
PHOSPHATE SERPL-MCNC: 2.2 MG/DL — LOW (ref 2.5–4.5)
PHOSPHATE SERPL-MCNC: 2.2 MG/DL — LOW (ref 2.5–4.5)
PLATELET # BLD AUTO: 252 K/UL — SIGNIFICANT CHANGE UP (ref 150–400)
PLATELET # BLD AUTO: 252 K/UL — SIGNIFICANT CHANGE UP (ref 150–400)
POTASSIUM SERPL-MCNC: 3.6 MMOL/L — SIGNIFICANT CHANGE UP (ref 3.5–5.3)
POTASSIUM SERPL-MCNC: 3.6 MMOL/L — SIGNIFICANT CHANGE UP (ref 3.5–5.3)
POTASSIUM SERPL-SCNC: 3.6 MMOL/L — SIGNIFICANT CHANGE UP (ref 3.5–5.3)
POTASSIUM SERPL-SCNC: 3.6 MMOL/L — SIGNIFICANT CHANGE UP (ref 3.5–5.3)
PROT SERPL-MCNC: 5.9 G/DL — LOW (ref 6–8.3)
PROT SERPL-MCNC: 5.9 G/DL — LOW (ref 6–8.3)
RBC # BLD: 2.46 M/UL — LOW (ref 4.2–5.8)
RBC # BLD: 2.46 M/UL — LOW (ref 4.2–5.8)
RBC # FLD: 14.6 % — HIGH (ref 10.3–14.5)
RBC # FLD: 14.6 % — HIGH (ref 10.3–14.5)
SODIUM SERPL-SCNC: 137 MMOL/L — SIGNIFICANT CHANGE UP (ref 135–145)
SODIUM SERPL-SCNC: 137 MMOL/L — SIGNIFICANT CHANGE UP (ref 135–145)
WBC # BLD: 16.07 K/UL — HIGH (ref 3.8–10.5)
WBC # BLD: 16.07 K/UL — HIGH (ref 3.8–10.5)
WBC # FLD AUTO: 16.07 K/UL — HIGH (ref 3.8–10.5)
WBC # FLD AUTO: 16.07 K/UL — HIGH (ref 3.8–10.5)

## 2023-12-28 PROCEDURE — 99232 SBSQ HOSP IP/OBS MODERATE 35: CPT

## 2023-12-28 PROCEDURE — 99233 SBSQ HOSP IP/OBS HIGH 50: CPT

## 2023-12-28 RX ORDER — POTASSIUM CHLORIDE 20 MEQ
20 PACKET (EA) ORAL ONCE
Refills: 0 | Status: COMPLETED | OUTPATIENT
Start: 2023-12-28 | End: 2023-12-28

## 2023-12-28 RX ORDER — SODIUM,POTASSIUM PHOSPHATES 278-250MG
1 POWDER IN PACKET (EA) ORAL ONCE
Refills: 0 | Status: COMPLETED | OUTPATIENT
Start: 2023-12-28 | End: 2023-12-28

## 2023-12-28 RX ADMIN — TICAGRELOR 60 MILLIGRAM(S): 90 TABLET ORAL at 21:46

## 2023-12-28 RX ADMIN — Medication 1 PACKET(S): at 06:53

## 2023-12-28 RX ADMIN — PIPERACILLIN AND TAZOBACTAM 25 GRAM(S): 4; .5 INJECTION, POWDER, LYOPHILIZED, FOR SOLUTION INTRAVENOUS at 21:48

## 2023-12-28 RX ADMIN — Medication 975 MILLIGRAM(S): at 21:45

## 2023-12-28 RX ADMIN — MEMANTINE HYDROCHLORIDE 5 MILLIGRAM(S): 10 TABLET ORAL at 21:45

## 2023-12-28 RX ADMIN — HEPARIN SODIUM 5000 UNIT(S): 5000 INJECTION INTRAVENOUS; SUBCUTANEOUS at 11:15

## 2023-12-28 RX ADMIN — RANOLAZINE 500 MILLIGRAM(S): 500 TABLET, FILM COATED, EXTENDED RELEASE ORAL at 21:46

## 2023-12-28 RX ADMIN — LEVETIRACETAM 250 MILLIGRAM(S): 250 TABLET, FILM COATED ORAL at 22:22

## 2023-12-28 RX ADMIN — TICAGRELOR 60 MILLIGRAM(S): 90 TABLET ORAL at 05:36

## 2023-12-28 RX ADMIN — HEPARIN SODIUM 5000 UNIT(S): 5000 INJECTION INTRAVENOUS; SUBCUTANEOUS at 05:35

## 2023-12-28 RX ADMIN — Medication 25 MILLIGRAM(S): at 21:57

## 2023-12-28 RX ADMIN — Medication 81 MILLIGRAM(S): at 11:15

## 2023-12-28 RX ADMIN — HEPARIN SODIUM 5000 UNIT(S): 5000 INJECTION INTRAVENOUS; SUBCUTANEOUS at 21:48

## 2023-12-28 RX ADMIN — ESCITALOPRAM OXALATE 10 MILLIGRAM(S): 10 TABLET, FILM COATED ORAL at 11:15

## 2023-12-28 RX ADMIN — Medication 25 MILLIGRAM(S): at 11:15

## 2023-12-28 RX ADMIN — Medication 975 MILLIGRAM(S): at 22:15

## 2023-12-28 RX ADMIN — INSULIN GLARGINE 20 UNIT(S): 100 INJECTION, SOLUTION SUBCUTANEOUS at 21:47

## 2023-12-28 RX ADMIN — Medication 975 MILLIGRAM(S): at 11:15

## 2023-12-28 RX ADMIN — PIPERACILLIN AND TAZOBACTAM 25 GRAM(S): 4; .5 INJECTION, POWDER, LYOPHILIZED, FOR SOLUTION INTRAVENOUS at 05:37

## 2023-12-28 RX ADMIN — Medication 975 MILLIGRAM(S): at 05:35

## 2023-12-28 RX ADMIN — Medication 20 MILLIEQUIVALENT(S): at 06:53

## 2023-12-28 RX ADMIN — LEVETIRACETAM 250 MILLIGRAM(S): 250 TABLET, FILM COATED ORAL at 05:35

## 2023-12-28 RX ADMIN — ATORVASTATIN CALCIUM 80 MILLIGRAM(S): 80 TABLET, FILM COATED ORAL at 21:57

## 2023-12-28 RX ADMIN — MEMANTINE HYDROCHLORIDE 5 MILLIGRAM(S): 10 TABLET ORAL at 05:36

## 2023-12-28 RX ADMIN — Medication 975 MILLIGRAM(S): at 05:00

## 2023-12-28 RX ADMIN — RANOLAZINE 500 MILLIGRAM(S): 500 TABLET, FILM COATED, EXTENDED RELEASE ORAL at 05:36

## 2023-12-28 NOTE — PROGRESS NOTE ADULT - ATTENDING COMMENTS
I agree with the detailed interval history, physical, and plan, which I have reviewed and edited where appropriate'; also agree with notes/assessment with my team on service.  I have personally examined the patient.  I was physically present for the key portions of the evaluation and management (E/M) service provided.  I reviewed all the pertinent data.  The patient is a critical care patient with life threatening hemodynamic and metabolic instability in SICU.  The SICU team has a constant risk benefit analyzes discussion and coordinating care with the primary team and all consultants.   The patient is in SICU with the chief complaint and diagnosis mentioned in the note.   The plan will be specified in the note.  89M s/p diagnostic laparoscopy and SMA stent placement with brachial cutdown in SICU for HD monitoring.   PHYSICAL EXAM:  Gen: NAD  Resp: clear  Cardiac: RR  Abd: soft  Extremities: warm  PLAN:   Neurologic:  - Keppra  - Tylenol  Respiratory:  -RA  Cardiovascular:  -MAP goal >65  Gastrointestinal/Nutrition:  -Advance Diet   Genitourinary/Renal:  -dc Moss   Hematologic:  - SQH   - ASA   Infectious Disease:  -Zosyn   Endocrine:  -ISS  -Lantus  -Monitor glucose   Disposition: dc floor I agree with the detailed interval history, physical, and plan, which I have reviewed and edited where appropriate'; also agree with notes/assessment with my team on service.  I have personally examined the patient.  I was physically present for the key portions of the evaluation and management (E/M) service provided.  I reviewed all the pertinent data.  The patient is a critical care patient with life threatening hemodynamic and metabolic instability in SICU.  The SICU team has a constant risk benefit analyzes discussion and coordinating care with the primary team and all consultants.   The patient is in SICU with the chief complaint and diagnosis mentioned in the note.   The plan will be specified in the note.  89M s/p diagnostic laparoscopy and SMA stent placement with brachial cutdown in SICU for HD monitoring.   PHYSICAL EXAM:  Gen: NAD  Resp: clear  Cardiac: RR  Abd: soft  Extremities: warm  PLAN:   Neurologic:  - Keppra  - Tylenol  Respiratory:  -RA  Cardiovascular:  -MAP goal >65  Gastrointestinal/Nutrition:  -Advance Diet   Genitourinary/Renal:  -dc Moss   Hematologic:  - SQH   - ASA   Infectious Disease:  -Zosyn   Endocrine:  -ISS  -Lantus  -Monitor glucose   Disposition: dc floor.

## 2023-12-28 NOTE — PROGRESS NOTE ADULT - ATTENDING COMMENTS
89 year old man s/p SMA stent for ischemic colitis  no abdominal or left upper extremity symptoms  benign exam  trend labs  advance diet  will follow up HIDA results

## 2023-12-28 NOTE — PROGRESS NOTE ADULT - SUBJECTIVE AND OBJECTIVE BOX
NEPHROLOGY    Patient seen and examined.    MEDICATIONS  (STANDING):  acetaminophen     Tablet .. 975 milliGRAM(s) Oral every 6 hours  aspirin  chewable 81 milliGRAM(s) Oral daily  atorvastatin 80 milliGRAM(s) Oral at bedtime  chlorhexidine 2% Cloths 1 Application(s) Topical daily  dextrose 50% Injectable 25 Gram(s) IV Push once  dextrose 50% Injectable 12.5 Gram(s) IV Push once  escitalopram 10 milliGRAM(s) Oral daily  heparin   Injectable 5000 Unit(s) SubCutaneous every 8 hours  insulin glargine Injectable (LANTUS) 20 Unit(s) SubCutaneous at bedtime  insulin lispro (ADMELOG) corrective regimen sliding scale   SubCutaneous every 6 hours  levETIRAcetam 250 milliGRAM(s) Oral two times a day  memantine 5 milliGRAM(s) Oral two times a day  metoprolol tartrate 25 milliGRAM(s) Oral two times a day  piperacillin/tazobactam IVPB.. 3.375 Gram(s) IV Intermittent every 12 hours  ranolazine 500 milliGRAM(s) Oral two times a day  ticagrelor 60 milliGRAM(s) Oral every 12 hours    VITALS:  T(C): , Max: 37.7 (12-27-23 @ 12:00)  T(F): , Max: 99.9 (12-27-23 @ 12:00)  HR: 80 (12-28-23 @ 08:00)  BP: 151/66 (12-28-23 @ 08:00)  BP(mean): 93 (12-28-23 @ 08:00)  RR: 26 (12-28-23 @ 08:00)  SpO2: 100% (12-28-23 @ 08:00)    I and O's:    12-27 @ 07:01  -  12-28 @ 07:00  --------------------------------------------------------  IN: 600 mL / OUT: 640 mL / NET: -40 mL    12-28 @ 07:01  -  12-28 @ 10:20  --------------------------------------------------------  IN: 0 mL / OUT: 300 mL / NET: -300 mL    PHYSICAL EXAM:  Constitutional: NAD.  HEENT: EOMI  Neck:  No JVD, supple   Respiratory: CTA B/L  Cardiovascular: S1 and S2, RRR  Gastrointestinal: + BS   Extremities: No peripheral edema, + peripheral pulses  Neurological: limited   Psychiatric: Normal mood, normal affect  : no Moss  Skin: No rashes, C/D/I    LABS:                        7.6    16.07 )-----------( 252      ( 28 Dec 2023 00:48 )             20.7     12-28    137  |  105  |  35<H>  ----------------------------<  212<H>  3.6   |  19<L>  |  1.78<H>    Ca    8.0<L>      28 Dec 2023 00:48  Phos  2.2     12-28  Mg     1.90     12-28    TPro  5.9<L>  /  Alb  2.8<L>  /  TBili  0.9  /  DBili  x   /  AST  23  /  ALT  10  /  AlkPhos  58  12-28    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     MEDICATIONS  (STANDING):  acetaminophen     Tablet .. 975 milliGRAM(s) Oral every 6 hours  aspirin  chewable 81 milliGRAM(s) Oral daily  atorvastatin 80 milliGRAM(s) Oral at bedtime  chlorhexidine 2% Cloths 1 Application(s) Topical daily  dextrose 50% Injectable 25 Gram(s) IV Push once  dextrose 50% Injectable 12.5 Gram(s) IV Push once  escitalopram 10 milliGRAM(s) Oral daily  heparin   Injectable 5000 Unit(s) SubCutaneous every 8 hours  insulin glargine Injectable (LANTUS) 20 Unit(s) SubCutaneous at bedtime  insulin lispro (ADMELOG) corrective regimen sliding scale   SubCutaneous every 6 hours  levETIRAcetam 250 milliGRAM(s) Oral two times a day  memantine 5 milliGRAM(s) Oral two times a day  metoprolol tartrate 25 milliGRAM(s) Oral two times a day  piperacillin/tazobactam IVPB.. 3.375 Gram(s) IV Intermittent every 12 hours  ranolazine 500 milliGRAM(s) Oral two times a day  ticagrelor 60 milliGRAM(s) Oral every 12 hours    VITALS:  T(C): , Max: 37.7 (12-27-23 @ 12:00)  T(F): , Max: 99.9 (12-27-23 @ 12:00)  HR: 80 (12-28-23 @ 08:00)  BP: 151/66 (12-28-23 @ 08:00)  BP(mean): 93 (12-28-23 @ 08:00)  RR: 26 (12-28-23 @ 08:00)  SpO2: 100% (12-28-23 @ 08:00)    I and O's:    12-27 @ 07:01  -  12-28 @ 07:00  --------------------------------------------------------  IN: 600 mL / OUT: 640 mL / NET: -40 mL    12-28 @ 07:01  -  12-28 @ 10:20  --------------------------------------------------------  IN: 0 mL / OUT: 300 mL / NET: -300 mL    PHYSICAL EXAM:  Constitutional: NAD.  HEENT: EOMI  Neck:  No JVD, supple   Respiratory: CTA B/L  Cardiovascular: S1 and S2, RRR  Gastrointestinal: + BS   Extremities: No peripheral edema, + peripheral pulses  Neurological: limited   Psychiatric: Normal mood, normal affect  : no Delong  Skin: No rashes, C/D/I    LABS:                        7.6    16.07 )-----------( 252      ( 28 Dec 2023 00:48 )             20.7     12-28    137  |  105  |  35<H>  ----------------------------<  212<H>  3.6   |  19<L>  |  1.78<H>    Ca    8.0<L>      28 Dec 2023 00:48  Phos  2.2     12-28  Mg     1.90     12-28    TPro  5.9<L>  /  Alb  2.8<L>  /  TBili  0.9  /  DBili  x   /  AST  23  /  ALT  10  /  AlkPhos  58  12-28    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     MEDICATIONS  (STANDING):  acetaminophen     Tablet .. 975 milliGRAM(s) Oral every 6 hours  aspirin  chewable 81 milliGRAM(s) Oral daily  atorvastatin 80 milliGRAM(s) Oral at bedtime  chlorhexidine 2% Cloths 1 Application(s) Topical daily  dextrose 50% Injectable 25 Gram(s) IV Push once  dextrose 50% Injectable 12.5 Gram(s) IV Push once  escitalopram 10 milliGRAM(s) Oral daily  heparin   Injectable 5000 Unit(s) SubCutaneous every 8 hours  insulin glargine Injectable (LANTUS) 20 Unit(s) SubCutaneous at bedtime  insulin lispro (ADMELOG) corrective regimen sliding scale   SubCutaneous every 6 hours  levETIRAcetam 250 milliGRAM(s) Oral two times a day  memantine 5 milliGRAM(s) Oral two times a day  metoprolol tartrate 25 milliGRAM(s) Oral two times a day  piperacillin/tazobactam IVPB.. 3.375 Gram(s) IV Intermittent every 12 hours  ranolazine 500 milliGRAM(s) Oral two times a day  ticagrelor 60 milliGRAM(s) Oral every 12 hours    VITALS:  T(C): , Max: 37.7 (12-27-23 @ 12:00)  T(F): , Max: 99.9 (12-27-23 @ 12:00)  HR: 80 (12-28-23 @ 08:00)  BP: 151/66 (12-28-23 @ 08:00)  BP(mean): 93 (12-28-23 @ 08:00)  RR: 26 (12-28-23 @ 08:00)  SpO2: 100% (12-28-23 @ 08:00)    I and O's:    12-27 @ 07:01  -  12-28 @ 07:00  --------------------------------------------------------  IN: 600 mL / OUT: 640 mL / NET: -40 mL    12-28 @ 07:01  -  12-28 @ 10:20  --------------------------------------------------------  IN: 0 mL / OUT: 300 mL / NET: -300 mL    PHYSICAL EXAM:  Constitutional: NAD.  HEENT: EOMI  Neck:  No JVD, supple   Respiratory: CTA B/L  Cardiovascular: S1 and S2, RRR  Gastrointestinal: + BS   Extremities: No peripheral edema, + peripheral pulses  Neurological: limited   Psychiatric: Normal mood, normal affect  : no Delong  Skin: No rashes, C/D/I    LABS:                        7.6    16.07 )-----------( 252      ( 28 Dec 2023 00:48 )             20.7     12-28    137  |  105  |  35<H>  ----------------------------<  212<H>  3.6   |  19<L>  |  1.78<H>    Ca    8.0<L>      28 Dec 2023 00:48  Phos  2.2     12-28  Mg     1.90     12-28    TPro  5.9<L>  /  Alb  2.8<L>  /  TBili  0.9  /  DBili  x   /  AST  23  /  ALT  10  /  AlkPhos  58  12-28    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     MEDICATIONS  (STANDING):  acetaminophen     Tablet .. 975 milliGRAM(s) Oral every 6 hours.  aspirin  chewable 81 milliGRAM(s) Oral daily  atorvastatin 80 milliGRAM(s) Oral at bedtime  chlorhexidine 2% Cloths 1 Application(s) Topical daily  dextrose 50% Injectable 25 Gram(s) IV Push once  dextrose 50% Injectable 12.5 Gram(s) IV Push once  escitalopram 10 milliGRAM(s) Oral daily  heparin   Injectable 5000 Unit(s) SubCutaneous every 8 hours  insulin glargine Injectable (LANTUS) 20 Unit(s) SubCutaneous at bedtime  insulin lispro (ADMELOG) corrective regimen sliding scale   SubCutaneous every 6 hours  levETIRAcetam 250 milliGRAM(s) Oral two times a day  memantine 5 milliGRAM(s) Oral two times a day  metoprolol tartrate 25 milliGRAM(s) Oral two times a day  piperacillin/tazobactam IVPB.. 3.375 Gram(s) IV Intermittent every 12 hours  ranolazine 500 milliGRAM(s) Oral two times a day  ticagrelor 60 milliGRAM(s) Oral every 12 hours    VITALS:  T(C): , Max: 37.7 (12-27-23 @ 12:00)  T(F): , Max: 99.9 (12-27-23 @ 12:00)  HR: 80 (12-28-23 @ 08:00)  BP: 151/66 (12-28-23 @ 08:00)  BP(mean): 93 (12-28-23 @ 08:00)  RR: 26 (12-28-23 @ 08:00)  SpO2: 100% (12-28-23 @ 08:00)    I and O's:    12-27 @ 07:01  -  12-28 @ 07:00  --------------------------------------------------------  IN: 600 mL / OUT: 640 mL / NET: -40 mL    12-28 @ 07:01  -  12-28 @ 10:20  --------------------------------------------------------  IN: 0 mL / OUT: 300 mL / NET: -300 mL    PHYSICAL EXAM:  Constitutional: NAD.  HEENT: EOMI  Neck:  No JVD, supple   Respiratory: CTA B/L  Cardiovascular: S1 and S2, RRR  Gastrointestinal: + BS   Extremities: No peripheral edema, + peripheral pulses  Neurological:   Psychiatric: Normal mood, normal affect  : no Moss  Skin: No rashes, C/D/I    LABS:                        7.6    16.07 )-----------( 252      ( 28 Dec 2023 00:48 )             20.7     12-28    137  |  105  |  35<H>  ----------------------------<  212<H>  3.6   |  19<L>  |  1.78<H>    Ca    8.0<L>      28 Dec 2023 00:48  Phos  2.2     12-28  Mg     1.90     12-28    TPro  5.9<L>  /  Alb  2.8<L>  /  TBili  0.9  /  DBili  x   /  AST  23  /  ALT  10  /  AlkPhos  58  12-28

## 2023-12-28 NOTE — PROGRESS NOTE ADULT - ASSESSMENT
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     EKG SR RBBB (old per family     1) CAD s/p CABG  -p/w chest pain. EKG non ischemic , trop -sera   - echo with normal lv and moderate AS   - c/w metoprolol      2) Covid +  - on remdesivir   - c/w supportive management   - t/t per primary team     3) Abd distension   -CTA AP obtained which showed  partially occlusive calcified and non-calcified plaque just distal to take-off of the SMA, with estimated at least 75% luminal narrowing. Limited evaluation of its distal branches. Patient taken emergently to OR on 12/24 s/p diagnostic laparoscopy and SMA stent placement with brachial cutdown  -Surgery/vascular on board , f/u recs  -pending HIDA scan

## 2023-12-28 NOTE — PROGRESS NOTE ADULT - ASSESSMENT
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 CTA demonstrating mesenteric fat stranding associated with ascending/transverse colon. S/p SMA angiography with stent placement with diagnostic laparoscopy 12/24, small bowel and visible colon viable, some inflammation of omentum in RUQ. NGT out, now with return of bowl function.       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 CTA demonstrating mesenteric fat stranding associated with ascending/transverse colon. S/p SMA angiography with stent placement with diagnostic laparoscopy 12/24, small bowel and visible colon viable, some inflammation of omentum in RUQ. NGT out, now with return of bowl function.       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 CTA demonstrating mesenteric fat stranding associated with ascending/transverse colon. S/p SMA angiography with stent placement with diagnostic laparoscopy 12/24, small bowel and visible colon viable, some inflammation of omentum in RUQ. NGT out, now with return of bowl function.     Arm is soft. No neuro symptoms. Ecchymosis to upper arm. Abdominal exam NT. WBC slightly downtrending, but still elevated from post op. Can obtain HIDA. If WBC continues to rise may need repeat imaging.     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 CTA demonstrating mesenteric fat stranding associated with ascending/transverse colon. S/p SMA angiography with stent placement with diagnostic laparoscopy 12/24, small bowel and visible colon viable, some inflammation of omentum in RUQ. NGT out, now with return of bowl function.     Arm is soft. No neuro symptoms. Ecchymosis to upper arm. Abdominal exam NT. WBC slightly downtrending, but still elevated from post op. Can obtain HIDA. If WBC continues to rise may need repeat imaging.     Plan:   - Pain control   - Awaiting HIDA as per gen surgery today  - Appreciate excellent care per SICU         C Team Surgery   [id number]

## 2023-12-28 NOTE — PROGRESS NOTE ADULT - SUBJECTIVE AND OBJECTIVE BOX
SICU Daily Progress Note  =====================================================  Interval/Overnight Events:      - CLD. NPO @ midnight for MENDEZ tomorrow   - passed TOV    PAST MEDICAL & SURGICAL HISTORY:  Hyperlipemia  Hypertension  Coronary Artery Disease  Diabetes Mellitus Type II  Stented Coronary Artery  total 5 stents, last stent 5/2019  Neuropathy  Myocardial infarction  Stroke  mild left facial numbness   no other residuals verbalized  Myoclonic jerking  Stage 3 chronic kidney disease  History of Cataract Extraction  Hx of CABG  H/O coronary angiogram  S/P coronary artery stent placement  1/6/09  S/P placement of cardiac pacemaker      ALLERGIES:  fluoroquinolone antibiotics (Other)  Cipro (Unknown)  Tegretol (Unknown)  carbamazepine (Other)      --------------------------------------------------------------------------------------    MEDICATIONS:    Neurologic Medications  acetaminophen     Tablet .. 975 milliGRAM(s) Oral every 6 hours  escitalopram 10 milliGRAM(s) Oral daily  levETIRAcetam 250 milliGRAM(s) Oral two times a day  memantine 5 milliGRAM(s) Oral two times a day    Respiratory Medications    Cardiovascular Medications  metoprolol tartrate 25 milliGRAM(s) Oral two times a day  ranolazine 500 milliGRAM(s) Oral two times a day    Gastrointestinal Medications    Genitourinary Medications    Hematologic/Oncologic Medications  aspirin  chewable 81 milliGRAM(s) Oral daily  heparin   Injectable 5000 Unit(s) SubCutaneous every 8 hours  ticagrelor 60 milliGRAM(s) Oral every 12 hours    Antimicrobial/Immunologic Medications  piperacillin/tazobactam IVPB.. 3.375 Gram(s) IV Intermittent every 12 hours    Endocrine/Metabolic Medications  atorvastatin 80 milliGRAM(s) Oral at bedtime  dextrose 50% Injectable 25 Gram(s) IV Push once  dextrose 50% Injectable 12.5 Gram(s) IV Push once  insulin glargine Injectable (LANTUS) 20 Unit(s) SubCutaneous at bedtime  insulin lispro (ADMELOG) corrective regimen sliding scale   SubCutaneous every 6 hours    Topical/Other Medications  chlorhexidine 2% Cloths 1 Application(s) Topical daily    --------------------------------------------------------------------------------------    VITAL SIGNS:  ICU Vital Signs Last 24 Hrs  T(C): 37.5 (28 Dec 2023 00:00), Max: 37.7 (27 Dec 2023 12:00)  T(F): 99.5 (28 Dec 2023 00:00), Max: 99.9 (27 Dec 2023 12:00)  HR: 87 (28 Dec 2023 00:00) (83 - 91)  BP: 168/74 (28 Dec 2023 00:00) (154/57 - 181/64)  BP(mean): 101 (28 Dec 2023 00:00) (84 - 110)  ABP: 171/68 (27 Dec 2023 09:45) (155/47 - 200/74)  ABP(mean): 111 (27 Dec 2023 09:45) (87 - 141)  RR: 26 (28 Dec 2023 00:00) (20 - 29)  SpO2: 96% (28 Dec 2023 00:00) (96% - 100%)    O2 Parameters below as of 28 Dec 2023 00:00  Patient On (Oxygen Delivery Method): room air      --------------------------------------------------------------------------------------    INS AND OUTS:  I&O's Detail    26 Dec 2023 07:01  -  27 Dec 2023 07:00  --------------------------------------------------------  IN:    dextrose 5% + lactated ringers: 800 mL    IV PiggyBack: 1000 mL    Lactated Ringers: 1400 mL  Total IN: 3200 mL    OUT:    Indwelling Catheter - Urethral (mL): 1190 mL  Total OUT: 1190 mL    Total NET: 2010 mL      27 Dec 2023 07:01  -  28 Dec 2023 00:07  --------------------------------------------------------  IN:    dextrose 5% + lactated ringers: 350 mL    Oral Fluid: 180 mL  Total IN: 530 mL    OUT:    Indwelling Catheter - Urethral (mL): 190 mL    Voided (mL): 450 mL  Total OUT: 640 mL    Total NET: -110 mL      --------------------------------------------------------------------------------------    EXAM  NEUROLOGY  Exam: Normal, in no acute distress.      RESPIRATORY  Exam: Normal expansion/effort.     CARDIOVASCULAR  Exam: S1, S2.  Regular rate and rhythm.       GI/NUTRITION  Exam: Abdomen soft, softly distended, incision sites with intact with SS strikethrough   Current Diet: CLD    VASCULAR  Exam: well perfused, L upper arm incision c/d/i, palpable pulses in UEs      HEMATOLOGIC  [x] VTE Prophylaxis: aspirin  chewable 81 milliGRAM(s) Oral daily  heparin   Injectable 5000 Unit(s) SubCutaneous every 8 hours  ticagrelor 60 milliGRAM(s) Oral every 12 hours      INFECTIOUS DISEASE  Antimicrobials/Immunologic Medications:  piperacillin/tazobactam IVPB.. 3.375 Gram(s) IV Intermittent every 12 hours      --------------------------------------------------------------------------------------    LABS                          7.8    18.15 )-----------( 254      ( 27 Dec 2023 12:43 )             22.4     12-27    137  |  104  |  40<H>  ----------------------------<  237<H>  4.1   |  19<L>  |  1.89<H>    Ca    8.1<L>      27 Dec 2023 12:43  Phos  3.2     12-27  Mg     2.10     12-27    TPro  6.3  /  Alb  2.9<L>  /  TBili  0.8  /  DBili  x   /  AST  24  /  ALT  13  /  AlkPhos  65  12-27    --------------------------------------------------------------------------------------   SICU Daily Progress Note  =====================================================  Interval/Overnight Events:       -No GI intervention  -CLD  -Listed      PAST MEDICAL & SURGICAL HISTORY:  Hyperlipemia  Hypertension  Coronary Artery Disease  Diabetes Mellitus Type II  Stented Coronary Artery  total 5 stents, last stent 5/2019  Neuropathy  Myocardial infarction  Stroke  mild left facial numbness   no other residuals verbalized  Myoclonic jerking  Stage 3 chronic kidney disease  History of Cataract Extraction  Hx of CABG  H/O coronary angiogram  S/P coronary artery stent placement  1/6/09  S/P placement of cardiac pacemaker      ALLERGIES:  fluoroquinolone antibiotics (Other)  Cipro (Unknown)  Tegretol (Unknown)  carbamazepine (Other)      --------------------------------------------------------------------------------------    MEDICATIONS:    Neurologic Medications  acetaminophen     Tablet .. 975 milliGRAM(s) Oral every 6 hours  escitalopram 10 milliGRAM(s) Oral daily  levETIRAcetam 250 milliGRAM(s) Oral two times a day  memantine 5 milliGRAM(s) Oral two times a day    Respiratory Medications    Cardiovascular Medications  metoprolol tartrate 25 milliGRAM(s) Oral two times a day  ranolazine 500 milliGRAM(s) Oral two times a day    Gastrointestinal Medications    Genitourinary Medications    Hematologic/Oncologic Medications  aspirin  chewable 81 milliGRAM(s) Oral daily  heparin   Injectable 5000 Unit(s) SubCutaneous every 8 hours  ticagrelor 60 milliGRAM(s) Oral every 12 hours    Antimicrobial/Immunologic Medications  piperacillin/tazobactam IVPB.. 3.375 Gram(s) IV Intermittent every 12 hours    Endocrine/Metabolic Medications  atorvastatin 80 milliGRAM(s) Oral at bedtime  dextrose 50% Injectable 25 Gram(s) IV Push once  dextrose 50% Injectable 12.5 Gram(s) IV Push once  insulin glargine Injectable (LANTUS) 20 Unit(s) SubCutaneous at bedtime  insulin lispro (ADMELOG) corrective regimen sliding scale   SubCutaneous every 6 hours    Topical/Other Medications  chlorhexidine 2% Cloths 1 Application(s) Topical daily    --------------------------------------------------------------------------------------    VITAL SIGNS:  ICU Vital Signs Last 24 Hrs  T(C): 37.5 (28 Dec 2023 00:00), Max: 37.7 (27 Dec 2023 12:00)  T(F): 99.5 (28 Dec 2023 00:00), Max: 99.9 (27 Dec 2023 12:00)  HR: 87 (28 Dec 2023 00:00) (83 - 91)  BP: 168/74 (28 Dec 2023 00:00) (154/57 - 181/64)  BP(mean): 101 (28 Dec 2023 00:00) (84 - 110)  ABP: 171/68 (27 Dec 2023 09:45) (155/47 - 200/74)  ABP(mean): 111 (27 Dec 2023 09:45) (87 - 141)  RR: 26 (28 Dec 2023 00:00) (20 - 29)  SpO2: 96% (28 Dec 2023 00:00) (96% - 100%)    O2 Parameters below as of 28 Dec 2023 00:00  Patient On (Oxygen Delivery Method): room air      --------------------------------------------------------------------------------------    INS AND OUTS:  I&O's Detail    26 Dec 2023 07:01  -  27 Dec 2023 07:00  --------------------------------------------------------  IN:    dextrose 5% + lactated ringers: 800 mL    IV PiggyBack: 1000 mL    Lactated Ringers: 1400 mL  Total IN: 3200 mL    OUT:    Indwelling Catheter - Urethral (mL): 1190 mL  Total OUT: 1190 mL    Total NET: 2010 mL      27 Dec 2023 07:01  -  28 Dec 2023 00:07  --------------------------------------------------------  IN:    dextrose 5% + lactated ringers: 350 mL    Oral Fluid: 180 mL  Total IN: 530 mL    OUT:    Indwelling Catheter - Urethral (mL): 190 mL    Voided (mL): 450 mL  Total OUT: 640 mL    Total NET: -110 mL      --------------------------------------------------------------------------------------    EXAM  NEUROLOGY  Exam: Normal, in no acute distress.      RESPIRATORY  Exam: Normal expansion/effort.     CARDIOVASCULAR  Exam: S1, S2.  Regular rate and rhythm.       GI/NUTRITION  Exam: Abdomen soft, softly distended, incision sites with intact with SS strikethrough   Current Diet: CLD    VASCULAR  Exam: well perfused, L upper arm incision c/d/i, palpable pulses in UEs      HEMATOLOGIC  [x] VTE Prophylaxis: aspirin  chewable 81 milliGRAM(s) Oral daily  heparin   Injectable 5000 Unit(s) SubCutaneous every 8 hours  ticagrelor 60 milliGRAM(s) Oral every 12 hours      INFECTIOUS DISEASE  Antimicrobials/Immunologic Medications:  piperacillin/tazobactam IVPB.. 3.375 Gram(s) IV Intermittent every 12 hours      --------------------------------------------------------------------------------------    LABS                          7.8    18.15 )-----------( 254      ( 27 Dec 2023 12:43 )             22.4     12-27    137  |  104  |  40<H>  ----------------------------<  237<H>  4.1   |  19<L>  |  1.89<H>    Ca    8.1<L>      27 Dec 2023 12:43  Phos  3.2     12-27  Mg     2.10     12-27    TPro  6.3  /  Alb  2.9<L>  /  TBili  0.8  /  DBili  x   /  AST  24  /  ALT  13  /  AlkPhos  65  12-27    --------------------------------------------------------------------------------------   SICU Daily Progress Note  =====================================================  Interval events:  -HIDA today, if negative then d/c antibiotics       PAST MEDICAL & SURGICAL HISTORY:  Hyperlipemia  Hypertension  Coronary Artery Disease  Diabetes Mellitus Type II  Stented Coronary Artery  total 5 stents, last stent 5/2019  Neuropathy  Myocardial infarction  Stroke  mild left facial numbness   no other residuals verbalized  Myoclonic jerking  Stage 3 chronic kidney disease  History of Cataract Extraction  Hx of CABG  H/O coronary angiogram  S/P coronary artery stent placement  1/6/09  S/P placement of cardiac pacemaker      ALLERGIES:  fluoroquinolone antibiotics (Other)  Cipro (Unknown)  Tegretol (Unknown)  carbamazepine (Other)      --------------------------------------------------------------------------------------    MEDICATIONS:    Neurologic Medications  acetaminophen     Tablet .. 975 milliGRAM(s) Oral every 6 hours  escitalopram 10 milliGRAM(s) Oral daily  levETIRAcetam 250 milliGRAM(s) Oral two times a day  memantine 5 milliGRAM(s) Oral two times a day    Respiratory Medications    Cardiovascular Medications  metoprolol tartrate 25 milliGRAM(s) Oral two times a day  ranolazine 500 milliGRAM(s) Oral two times a day    Gastrointestinal Medications    Genitourinary Medications    Hematologic/Oncologic Medications  aspirin  chewable 81 milliGRAM(s) Oral daily  heparin   Injectable 5000 Unit(s) SubCutaneous every 8 hours  ticagrelor 60 milliGRAM(s) Oral every 12 hours    Antimicrobial/Immunologic Medications  piperacillin/tazobactam IVPB.. 3.375 Gram(s) IV Intermittent every 12 hours    Endocrine/Metabolic Medications  atorvastatin 80 milliGRAM(s) Oral at bedtime  dextrose 50% Injectable 25 Gram(s) IV Push once  dextrose 50% Injectable 12.5 Gram(s) IV Push once  insulin glargine Injectable (LANTUS) 20 Unit(s) SubCutaneous at bedtime  insulin lispro (ADMELOG) corrective regimen sliding scale   SubCutaneous every 6 hours    Topical/Other Medications  chlorhexidine 2% Cloths 1 Application(s) Topical daily    --------------------------------------------------------------------------------------    VITAL SIGNS:  ICU Vital Signs Last 24 Hrs  T(C): 37.5 (28 Dec 2023 00:00), Max: 37.7 (27 Dec 2023 12:00)  T(F): 99.5 (28 Dec 2023 00:00), Max: 99.9 (27 Dec 2023 12:00)  HR: 87 (28 Dec 2023 00:00) (83 - 91)  BP: 168/74 (28 Dec 2023 00:00) (154/57 - 181/64)  BP(mean): 101 (28 Dec 2023 00:00) (84 - 110)  ABP: 171/68 (27 Dec 2023 09:45) (155/47 - 200/74)  ABP(mean): 111 (27 Dec 2023 09:45) (87 - 141)  RR: 26 (28 Dec 2023 00:00) (20 - 29)  SpO2: 96% (28 Dec 2023 00:00) (96% - 100%)    O2 Parameters below as of 28 Dec 2023 00:00  Patient On (Oxygen Delivery Method): room air      --------------------------------------------------------------------------------------    INS AND OUTS:  I&O's Detail    26 Dec 2023 07:01  -  27 Dec 2023 07:00  --------------------------------------------------------  IN:    dextrose 5% + lactated ringers: 800 mL    IV PiggyBack: 1000 mL    Lactated Ringers: 1400 mL  Total IN: 3200 mL    OUT:    Indwelling Catheter - Urethral (mL): 1190 mL  Total OUT: 1190 mL    Total NET: 2010 mL      27 Dec 2023 07:01  -  28 Dec 2023 00:07  --------------------------------------------------------  IN:    dextrose 5% + lactated ringers: 350 mL    Oral Fluid: 180 mL  Total IN: 530 mL    OUT:    Indwelling Catheter - Urethral (mL): 190 mL    Voided (mL): 450 mL  Total OUT: 640 mL    Total NET: -110 mL      --------------------------------------------------------------------------------------    EXAM  NEUROLOGY  Exam: Normal, in no acute distress.      RESPIRATORY  Exam: Normal expansion/effort.     CARDIOVASCULAR  Exam: S1, S2.  Regular rate and rhythm.       GI/NUTRITION  Exam: Abdomen soft, softly distended, incision sites with intact with SS strikethrough   Current Diet: CLD    VASCULAR  Exam: well perfused, L upper arm incision c/d/i, palpable pulses in UEs      HEMATOLOGIC  [x] VTE Prophylaxis: aspirin  chewable 81 milliGRAM(s) Oral daily  heparin   Injectable 5000 Unit(s) SubCutaneous every 8 hours  ticagrelor 60 milliGRAM(s) Oral every 12 hours      INFECTIOUS DISEASE  Antimicrobials/Immunologic Medications:  piperacillin/tazobactam IVPB.. 3.375 Gram(s) IV Intermittent every 12 hours      --------------------------------------------------------------------------------------    LABS                          7.8    18.15 )-----------( 254      ( 27 Dec 2023 12:43 )             22.4     12-27    137  |  104  |  40<H>  ----------------------------<  237<H>  4.1   |  19<L>  |  1.89<H>    Ca    8.1<L>      27 Dec 2023 12:43  Phos  3.2     12-27  Mg     2.10     12-27    TPro  6.3  /  Alb  2.9<L>  /  TBili  0.8  /  DBili  x   /  AST  24  /  ALT  13  /  AlkPhos  65  12-27    --------------------------------------------------------------------------------------

## 2023-12-28 NOTE — PROGRESS NOTE ADULT - ASSESSMENT
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 Surgery consulted on 12/24 for abdominal pain and distension. CTA AP obtained which showed  partially occlusive calcified and non-calcified plaque just distal to take-off of the SMA, with estimated at least 75% luminal narrowing. Limited evaluation of its distal branches. Patient taken emergently to OR on 12/24 with general surgery and vascular surgery. Patient s/p diagnostic laparoscopy and SMA stent placement with brachial cutdown. SICU consulted postoperatively for HD monitoring, now listed for floor.      PLAN:   Neurologic:  -A&Ox2 (baseline per family)   -Continue Keppra- hx of myoclonic jerks   -Pain: Tylenol  -ASA 81 for hx CVA, stents    Respiratory:  -Maintain O2 saturation >92%  -COVID +    Cardiovascular:  -MAP goal >65  -Metoprolol 25 bid and home ranolazine   -ASA 81 qd      Gastrointestinal/Nutrition:  -Diet: CLD  -F/u RUQ US 12/26-> GB distended with cholelithiasis, equivocal   -Trial abx for cholecystitis, no plan for percutaneous cholecystostomy at this time  - pending HIDA    Genitourinary/Renal:  - passed TOV  - hx of CKD (Baseline Cr 1.2-1.3)   - Monitor strict I/Os     Hematologic:  -DVT ppx: SQH   -ASA 81 PO for hx stents, CVA x3  -Brilinta qd    Infectious Disease:  -Abx: Zosyn through 12/28, s/p remdesivir dced on 12/26 for worsening MABLE  -Monitor WBC   -Monitor fever curve     Endocrine:  -T2DM   -ISS, Lantus 20U   -Monitor blood glucose     Disposition: Listed 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 Surgery consulted on 12/24 for abdominal pain and distension. CTA AP obtained which showed  partially occlusive calcified and non-calcified plaque just distal to take-off of the SMA, with estimated at least 75% luminal narrowing. Limited evaluation of its distal branches. Patient taken emergently to OR on 12/24 with general surgery and vascular surgery. Patient s/p diagnostic laparoscopy and SMA stent placement with brachial cutdown. SICU consulted postoperatively for HD monitoring, now listed for floor.    PLAN:   Neurologic:  - Pain control with Tylenol  - Melatonin 6qhs    Respiratory:  -Maintain O2 saturation >92%  -Duonebs and mucomyst q6    Cardiovascular:  -MAP goal >65  -Home amlodipine 10qd  -Home losartan 50qd    Gastrointestinal/Nutrition:  -Diet: CLD  -Protonix 40 QD   -Monitor IR drain output  -UGI on 12/24 negative for UGI leak  -Gastric lavage on 12/25 negative for UGIB    Genitourinary/Renal:  - delong  -Monitor strict I/Os     Hematologic:  -DVT ppx: SQH  -Holding aspirin  -Monitor H/H     Infectious Disease:  -12/18 Abdominal culture growing E faecium VRE  -Abx: Linezolid (12/21-28) per ID  -Nystatin for pannus rash  -Monitor WBC     Endocrine:  -Monitor blood glucose   -Lantus 12qhs  -Mod ISS    Disposition: Listed 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 Surgery consulted on 12/24 for abdominal pain and distension. CTA AP obtained which showed  partially occlusive calcified and non-calcified plaque just distal to take-off of the SMA, with estimated at least 75% luminal narrowing. Limited evaluation of its distal branches. Patient taken emergently to OR on 12/24 with general surgery and vascular surgery. Patient s/p diagnostic laparoscopy and SMA stent placement with brachial cutdown. SICU consulted postoperatively for HD monitoring, now listed for floor.    PLAN:   Neurologic:  -A&Ox2 (baseline per family)   -Continue Keppra- hx of myoclonic jerks   -Pain: Tylenol  -ASA 81 for hx CVA, stents    Respiratory:  -Maintain O2 saturation >92%  -COVID +    Cardiovascular:  -MAP goal >65  -Metoprolol 25 bid and home ranolazine   -ASA 81 qd      Gastrointestinal/Nutrition:  -Diet: CLD  -F/u RUQ US 12/26-> GB distended with cholelithiasis, equivocal   -Trial abx for cholecystitis, no plan for percutaneous cholecystostomy at this time  -Pending HIDA    Genitourinary/Renal:  - Hx of CKD (Baseline Cr 1.2-1.3)   - Monitor strict I/Os     Hematologic:  -DVT ppx: SQH   -ASA 81 PO for hx stents, CVA x3  -Brilinta qd    Infectious Disease:  -Abx: Zosyn through 12/28, s/p remdesivir   -Monitor WBC   -Monitor fever curve     Endocrine:  -T2DM   -ISS, Lantus 20U   -Monitor blood glucose     Disposition: Listed

## 2023-12-28 NOTE — PROGRESS NOTE ADULT - SUBJECTIVE AND OBJECTIVE BOX
Aashish Boyer MD  Interventional Cardiology / Advance Heart Failure and Cardiac Transplant Specialist  [place] Office : [address] [place] [address]  Tel:   [place] Office : [address] [facility] [address]  Tel: [phone]      Subjective/Overnight events: Pt is lying in bed comfortable not in distress  	  MEDICATIONS:  aspirin  chewable 81 milliGRAM(s) Oral daily  heparin   Injectable 5000 Unit(s) SubCutaneous every 8 hours  metoprolol tartrate 25 milliGRAM(s) Oral two times a day  ranolazine 500 milliGRAM(s) Oral two times a day  ticagrelor 60 milliGRAM(s) Oral every 12 hours    piperacillin/tazobactam IVPB.. 3.375 Gram(s) IV Intermittent every 12 hours      acetaminophen     Tablet .. 975 milliGRAM(s) Oral every 6 hours  escitalopram 10 milliGRAM(s) Oral daily  levETIRAcetam 250 milliGRAM(s) Oral two times a day  memantine 5 milliGRAM(s) Oral two times a day      atorvastatin 80 milliGRAM(s) Oral at bedtime  dextrose 50% Injectable 25 Gram(s) IV Push once  dextrose 50% Injectable 12.5 Gram(s) IV Push once  insulin glargine Injectable (LANTUS) 20 Unit(s) SubCutaneous at bedtime  insulin lispro (ADMELOG) corrective regimen sliding scale   SubCutaneous every 6 hours    chlorhexidine 2% Cloths 1 Application(s) Topical daily      PAST MEDICAL/SURGICAL HISTORY  PAST MEDICAL & SURGICAL HISTORY:  Hyperlipemia      Hypertension      Coronary Artery Disease      Diabetes Mellitus Type II      Stented Coronary Artery  total 5 stents, last stent 5/2019      Neuropathy      Myocardial infarction      Stroke  mild left facial numbness   no other residuals verbalized      Myoclonic jerking      Stage 3 chronic kidney disease      History of Cataract Extraction      Hx of CABG      H/O coronary angiogram      S/P coronary artery stent placement  1/6/09      S/P placement of cardiac pacemaker          SOCIAL HISTORY: Substance Use (street drugs): ( x ) never used  (  ) other:    FAMILY HISTORY:  No pertinent family history in first degree relatives            PHYSICAL EXAM:  T(C): 36 (12-28-23 @ 04:00), Max: 37.5 (12-28-23 @ 00:00)  HR: 80 (12-28-23 @ 08:00) (78 - 91)  BP: 151/66 (12-28-23 @ 08:00) (140/60 - 169/76)  RR: 26 (12-28-23 @ 08:00) (20 - 28)  SpO2: 100% (12-28-23 @ 08:00) (96% - 100%)  Wt(kg): --  I&O's Summary    27 Dec 2023 07:01  -  28 Dec 2023 07:00  --------------------------------------------------------  IN: 600 mL / OUT: 640 mL / NET: -40 mL    28 Dec 2023 07:01  -  28 Dec 2023 16:19  --------------------------------------------------------  IN: 0 mL / OUT: 300 mL / NET: -300 mL            GENERAL: confused   EYES: conjunctiva and sclera clear  ENMT: No tonsillar erythema, exudates, or enlargement  Cardiovascular: Normal S1 S2, No JVD, 2/6 systolic murmur   Respiratory: basal creps + 	  Gastrointestinal:  s/p laprotomy and SMA stenting  	  Extremities: No edema                                  7.6    16.07 )-----------( 252      ( 28 Dec 2023 00:48 )             20.7     12-28    137  |  105  |  35<H>  ----------------------------<  212<H>  3.6   |  19<L>  |  1.78<H>    Ca    8.0<L>      28 Dec 2023 00:48  Phos  2.2     12-28  Mg     1.90     12-28    TPro  5.9<L>  /  Alb  2.8<L>  /  TBili  0.9  /  DBili  x   /  AST  23  /  ALT  10  /  AlkPhos  58  12-28    proBNP:   Lipid Profile:   HgA1c:   TSH:     Consultant(s) Notes Reviewed:  [x ] YES  [ ] NO    Care Discussed with Consultants/Other Providers [ x] YES  [ ] NO    Imaging Personally Reviewed independently:  [x] YES  [ ] NO    All labs, radiologic studies, vitals, orders and medications list reviewed. Patient is seen and examined at bedside. Case discussed with medical team.         Aashish Boyer MD  Interventional Cardiology / Advance Heart Failure and Cardiac Transplant Specialist  [place] Office : [address] [place] [address]  Tel:   [place] Office : [address] [facility] [address]  Tel: [phone]      Subjective/Overnight events: Pt is lying in bed comfortable not in distress  	  MEDICATIONS:  aspirin  chewable 81 milliGRAM(s) Oral daily  heparin   Injectable 5000 Unit(s) SubCutaneous every 8 hours  metoprolol tartrate 25 milliGRAM(s) Oral two times a day  ranolazine 500 milliGRAM(s) Oral two times a day  ticagrelor 60 milliGRAM(s) Oral every 12 hours    piperacillin/tazobactam IVPB.. 3.375 Gram(s) IV Intermittent every 12 hours      acetaminophen     Tablet .. 975 milliGRAM(s) Oral every 6 hours  escitalopram 10 milliGRAM(s) Oral daily  levETIRAcetam 250 milliGRAM(s) Oral two times a day  memantine 5 milliGRAM(s) Oral two times a day      atorvastatin 80 milliGRAM(s) Oral at bedtime  dextrose 50% Injectable 25 Gram(s) IV Push once  dextrose 50% Injectable 12.5 Gram(s) IV Push once  insulin glargine Injectable (LANTUS) 20 Unit(s) SubCutaneous at bedtime  insulin lispro (ADMELOG) corrective regimen sliding scale   SubCutaneous every 6 hours    chlorhexidine 2% Cloths 1 Application(s) Topical daily      PAST MEDICAL/SURGICAL HISTORY  PAST MEDICAL & SURGICAL HISTORY:  Hyperlipemia      Hypertension      Coronary Artery Disease      Diabetes Mellitus Type II      Stented Coronary Artery  total 5 stents, last stent 5/2019      Neuropathy      Myocardial infarction      Stroke  mild left facial numbness   no other residuals verbalized      Myoclonic jerking      Stage 3 chronic kidney disease      History of Cataract Extraction      Hx of CABG      H/O coronary angiogram      S/P coronary artery stent placement  1/6/09      S/P placement of cardiac pacemaker          SOCIAL HISTORY: Substance Use (street drugs): ( x ) never used  (  ) other:    FAMILY HISTORY:  No pertinent family history in first degree relatives            PHYSICAL EXAM:  T(C): 36 (12-28-23 @ 04:00), Max: 37.5 (12-28-23 @ 00:00)  HR: 80 (12-28-23 @ 08:00) (78 - 91)  BP: 151/66 (12-28-23 @ 08:00) (140/60 - 169/76)  RR: 26 (12-28-23 @ 08:00) (20 - 28)  SpO2: 100% (12-28-23 @ 08:00) (96% - 100%)  Wt(kg): --  I&O's Summary    27 Dec 2023 07:01  -  28 Dec 2023 07:00  --------------------------------------------------------  IN: 600 mL / OUT: 640 mL / NET: -40 mL    28 Dec 2023 07:01  -  28 Dec 2023 16:19  --------------------------------------------------------  IN: 0 mL / OUT: 300 mL / NET: -300 mL            GENERAL: confused   EYES: conjunctiva and sclera clear  ENMT: No tonsillar erythema, exudates, or enlargement  Cardiovascular: Normal S1 S2, No JVD, 2/6 systolic murmur   Respiratory: basal creps + 	  Gastrointestinal:  s/p laprotomy and SMA stenting  	  Extremities: No edema                                  7.6    16.07 )-----------( 252      ( 28 Dec 2023 00:48 )             20.7     12-28    137  |  105  |  35<H>  ----------------------------<  212<H>  3.6   |  19<L>  |  1.78<H>    Ca    8.0<L>      28 Dec 2023 00:48  Phos  2.2     12-28  Mg     1.90     12-28    TPro  5.9<L>  /  Alb  2.8<L>  /  TBili  0.9  /  DBili  x   /  AST  23  /  ALT  10  /  AlkPhos  58  12-28    proBNP:   Lipid Profile:   HgA1c:   TSH:     Consultant(s) Notes Reviewed:  [x ] YES  [ ] NO    Care Discussed with Consultants/Other Providers [ x] YES  [ ] NO    Imaging Personally Reviewed independently:  [x] YES  [ ] NO    All labs, radiologic studies, vitals, orders and medications list reviewed. Patient is seen and examined at bedside. Case discussed with medical team.

## 2023-12-28 NOTE — PROGRESS NOTE ADULT - SUBJECTIVE AND OBJECTIVE BOX
SURGERY PROGRESS NOTE    SUBJECTIVE / 24H EVENTS:  Patient seen and examined on morning rounds. C/O minimal abdominal pain.      MEDICATIONS  (STANDING):  acetaminophen     Tablet .. 975 milliGRAM(s) Oral every 6 hours  aspirin  chewable 81 milliGRAM(s) Oral daily  atorvastatin 80 milliGRAM(s) Oral at bedtime  chlorhexidine 2% Cloths 1 Application(s) Topical daily  dextrose 50% Injectable 25 Gram(s) IV Push once  dextrose 50% Injectable 12.5 Gram(s) IV Push once  escitalopram 10 milliGRAM(s) Oral daily  heparin   Injectable 5000 Unit(s) SubCutaneous every 8 hours  insulin glargine Injectable (LANTUS) 20 Unit(s) SubCutaneous at bedtime  insulin lispro (ADMELOG) corrective regimen sliding scale   SubCutaneous every 6 hours  levETIRAcetam 250 milliGRAM(s) Oral two times a day  memantine 5 milliGRAM(s) Oral two times a day  metoprolol tartrate 25 milliGRAM(s) Oral two times a day  piperacillin/tazobactam IVPB.. 3.375 Gram(s) IV Intermittent every 12 hours  ranolazine 500 milliGRAM(s) Oral two times a day  ticagrelor 60 milliGRAM(s) Oral every 12 hours    MEDICATIONS  (PRN):      OBJECTIVE:  ICU Vital Signs Last 24 Hrs  T(C): 36 (28 Dec 2023 04:00), Max: 37.7 (27 Dec 2023 12:00)  T(F): 96.8 (28 Dec 2023 04:00), Max: 99.9 (27 Dec 2023 12:00)  HR: 78 (28 Dec 2023 04:00) (78 - 91)  BP: 140/60 (28 Dec 2023 04:00) (140/60 - 175/77)  BP(mean): 83 (28 Dec 2023 04:00) (83 - 110)  ABP: 171/68 (27 Dec 2023 09:45) (171/68 - 175/67)  ABP(mean): 111 (27 Dec 2023 09:45) (111 - 138)  RR: 20 (28 Dec 2023 04:00) (20 - 28)  SpO2: 99% (28 Dec 2023 04:00) (96% - 100%)    O2 Parameters below as of 28 Dec 2023 04:00  Patient On (Oxygen Delivery Method): room air      I&O's Summary    27 Dec 2023 07:01  -  28 Dec 2023 07:00  --------------------------------------------------------  IN: 600 mL / OUT: 640 mL / NET: -40 mL      PHYSICAL EXAM:  Gen: NAD  Resp: Non-labored breathing on RA. No wheezes / rhonchi  Cardiac: S1S2, reg rate  GI: Softly distended, nontender on exam  Ext: Warm, well perfused      LABS:                        7.6    16.07 )-----------( 252      ( 28 Dec 2023 00:48 )             20.7     12-28    137  |  105  |  35<H>  ----------------------------<  212<H>  3.6   |  19<L>  |  1.78<H>    Ca    8.0<L>      28 Dec 2023 00:48  Phos  2.2     12-28  Mg     1.90     12-28    TPro  5.9<L>  /  Alb  2.8<L>  /  TBili  0.9  /  DBili  x   /  AST  23  /  ALT  10  /  AlkPhos  58  12-28        RADIOLOGY:  < from: US Abdomen Upper Quadrant Right (12.26.23 @ 14:21) >  IMPRESSION:  Limited exam.    Distended gallbladder with cholelithiasis and sludge, and top normal wall   thickness. No focal pain was elicited over the gallbladder during the   exam. If there remains concern for acute cholecystitis, consider further   evaluation with HIDA scan.

## 2023-12-28 NOTE — PROGRESS NOTE ADULT - ASSESSMENT
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

## 2023-12-29 LAB
ALBUMIN SERPL ELPH-MCNC: 2.6 G/DL — LOW (ref 3.3–5)
ALBUMIN SERPL ELPH-MCNC: 2.6 G/DL — LOW (ref 3.3–5)
ALP SERPL-CCNC: 58 U/L — SIGNIFICANT CHANGE UP (ref 40–120)
ALP SERPL-CCNC: 58 U/L — SIGNIFICANT CHANGE UP (ref 40–120)
ALT FLD-CCNC: 13 U/L — SIGNIFICANT CHANGE UP (ref 4–41)
ALT FLD-CCNC: 13 U/L — SIGNIFICANT CHANGE UP (ref 4–41)
ANION GAP SERPL CALC-SCNC: 10 MMOL/L — SIGNIFICANT CHANGE UP (ref 7–14)
ANION GAP SERPL CALC-SCNC: 10 MMOL/L — SIGNIFICANT CHANGE UP (ref 7–14)
APTT BLD: 34.1 SEC — SIGNIFICANT CHANGE UP (ref 24.5–35.6)
APTT BLD: 34.1 SEC — SIGNIFICANT CHANGE UP (ref 24.5–35.6)
AST SERPL-CCNC: 27 U/L — SIGNIFICANT CHANGE UP (ref 4–40)
AST SERPL-CCNC: 27 U/L — SIGNIFICANT CHANGE UP (ref 4–40)
BILIRUB SERPL-MCNC: 0.8 MG/DL — SIGNIFICANT CHANGE UP (ref 0.2–1.2)
BILIRUB SERPL-MCNC: 0.8 MG/DL — SIGNIFICANT CHANGE UP (ref 0.2–1.2)
BUN SERPL-MCNC: 32 MG/DL — HIGH (ref 7–23)
BUN SERPL-MCNC: 32 MG/DL — HIGH (ref 7–23)
CALCIUM SERPL-MCNC: 7.6 MG/DL — LOW (ref 8.4–10.5)
CALCIUM SERPL-MCNC: 7.6 MG/DL — LOW (ref 8.4–10.5)
CHLORIDE SERPL-SCNC: 106 MMOL/L — SIGNIFICANT CHANGE UP (ref 98–107)
CHLORIDE SERPL-SCNC: 106 MMOL/L — SIGNIFICANT CHANGE UP (ref 98–107)
CO2 SERPL-SCNC: 22 MMOL/L — SIGNIFICANT CHANGE UP (ref 22–31)
CO2 SERPL-SCNC: 22 MMOL/L — SIGNIFICANT CHANGE UP (ref 22–31)
CREAT SERPL-MCNC: 1.71 MG/DL — HIGH (ref 0.5–1.3)
CREAT SERPL-MCNC: 1.71 MG/DL — HIGH (ref 0.5–1.3)
CULTURE RESULTS: SIGNIFICANT CHANGE UP
EGFR: 38 ML/MIN/1.73M2 — LOW
EGFR: 38 ML/MIN/1.73M2 — LOW
GLUCOSE SERPL-MCNC: 95 MG/DL — SIGNIFICANT CHANGE UP (ref 70–99)
GLUCOSE SERPL-MCNC: 95 MG/DL — SIGNIFICANT CHANGE UP (ref 70–99)
HCT VFR BLD CALC: 23.9 % — LOW (ref 39–50)
HCT VFR BLD CALC: 23.9 % — LOW (ref 39–50)
HGB BLD-MCNC: 8.2 G/DL — LOW (ref 13–17)
HGB BLD-MCNC: 8.2 G/DL — LOW (ref 13–17)
INR BLD: 1.23 RATIO — HIGH (ref 0.85–1.18)
INR BLD: 1.23 RATIO — HIGH (ref 0.85–1.18)
MAGNESIUM SERPL-MCNC: 2.1 MG/DL — SIGNIFICANT CHANGE UP (ref 1.6–2.6)
MAGNESIUM SERPL-MCNC: 2.1 MG/DL — SIGNIFICANT CHANGE UP (ref 1.6–2.6)
MCHC RBC-ENTMCNC: 30.1 PG — SIGNIFICANT CHANGE UP (ref 27–34)
MCHC RBC-ENTMCNC: 30.1 PG — SIGNIFICANT CHANGE UP (ref 27–34)
MCHC RBC-ENTMCNC: 34.3 GM/DL — SIGNIFICANT CHANGE UP (ref 32–36)
MCHC RBC-ENTMCNC: 34.3 GM/DL — SIGNIFICANT CHANGE UP (ref 32–36)
MCV RBC AUTO: 87.9 FL — SIGNIFICANT CHANGE UP (ref 80–100)
MCV RBC AUTO: 87.9 FL — SIGNIFICANT CHANGE UP (ref 80–100)
NRBC # BLD: 0 /100 WBCS — SIGNIFICANT CHANGE UP (ref 0–0)
NRBC # BLD: 0 /100 WBCS — SIGNIFICANT CHANGE UP (ref 0–0)
NRBC # FLD: 0.02 K/UL — HIGH (ref 0–0)
NRBC # FLD: 0.02 K/UL — HIGH (ref 0–0)
PHOSPHATE SERPL-MCNC: 2.4 MG/DL — LOW (ref 2.5–4.5)
PHOSPHATE SERPL-MCNC: 2.4 MG/DL — LOW (ref 2.5–4.5)
PLATELET # BLD AUTO: 328 K/UL — SIGNIFICANT CHANGE UP (ref 150–400)
PLATELET # BLD AUTO: 328 K/UL — SIGNIFICANT CHANGE UP (ref 150–400)
POTASSIUM SERPL-MCNC: 3.8 MMOL/L — SIGNIFICANT CHANGE UP (ref 3.5–5.3)
POTASSIUM SERPL-MCNC: 3.8 MMOL/L — SIGNIFICANT CHANGE UP (ref 3.5–5.3)
POTASSIUM SERPL-SCNC: 3.8 MMOL/L — SIGNIFICANT CHANGE UP (ref 3.5–5.3)
POTASSIUM SERPL-SCNC: 3.8 MMOL/L — SIGNIFICANT CHANGE UP (ref 3.5–5.3)
PROT SERPL-MCNC: 5.9 G/DL — LOW (ref 6–8.3)
PROT SERPL-MCNC: 5.9 G/DL — LOW (ref 6–8.3)
PROTHROM AB SERPL-ACNC: 13.8 SEC — HIGH (ref 9.5–13)
PROTHROM AB SERPL-ACNC: 13.8 SEC — HIGH (ref 9.5–13)
RBC # BLD: 2.72 M/UL — LOW (ref 4.2–5.8)
RBC # BLD: 2.72 M/UL — LOW (ref 4.2–5.8)
RBC # FLD: 15.2 % — HIGH (ref 10.3–14.5)
RBC # FLD: 15.2 % — HIGH (ref 10.3–14.5)
SODIUM SERPL-SCNC: 138 MMOL/L — SIGNIFICANT CHANGE UP (ref 135–145)
SODIUM SERPL-SCNC: 138 MMOL/L — SIGNIFICANT CHANGE UP (ref 135–145)
SPECIMEN SOURCE: SIGNIFICANT CHANGE UP
WBC # BLD: 14.07 K/UL — HIGH (ref 3.8–10.5)
WBC # BLD: 14.07 K/UL — HIGH (ref 3.8–10.5)
WBC # FLD AUTO: 14.07 K/UL — HIGH (ref 3.8–10.5)
WBC # FLD AUTO: 14.07 K/UL — HIGH (ref 3.8–10.5)

## 2023-12-29 PROCEDURE — 99233 SBSQ HOSP IP/OBS HIGH 50: CPT

## 2023-12-29 PROCEDURE — 99232 SBSQ HOSP IP/OBS MODERATE 35: CPT

## 2023-12-29 PROCEDURE — 78227 HEPATOBIL SYST IMAGE W/DRUG: CPT | Mod: 26

## 2023-12-29 RX ORDER — LANOLIN ALCOHOL/MO/W.PET/CERES
6 CREAM (GRAM) TOPICAL AT BEDTIME
Refills: 0 | Status: DISCONTINUED | OUTPATIENT
Start: 2023-12-29 | End: 2024-01-01

## 2023-12-29 RX ADMIN — TICAGRELOR 60 MILLIGRAM(S): 90 TABLET ORAL at 17:51

## 2023-12-29 RX ADMIN — RANOLAZINE 500 MILLIGRAM(S): 500 TABLET, FILM COATED, EXTENDED RELEASE ORAL at 17:51

## 2023-12-29 RX ADMIN — CHLORHEXIDINE GLUCONATE 1 APPLICATION(S): 213 SOLUTION TOPICAL at 13:59

## 2023-12-29 RX ADMIN — MEMANTINE HYDROCHLORIDE 5 MILLIGRAM(S): 10 TABLET ORAL at 06:12

## 2023-12-29 RX ADMIN — Medication 975 MILLIGRAM(S): at 06:15

## 2023-12-29 RX ADMIN — INSULIN GLARGINE 20 UNIT(S): 100 INJECTION, SOLUTION SUBCUTANEOUS at 23:05

## 2023-12-29 RX ADMIN — LEVETIRACETAM 250 MILLIGRAM(S): 250 TABLET, FILM COATED ORAL at 06:11

## 2023-12-29 RX ADMIN — ESCITALOPRAM OXALATE 10 MILLIGRAM(S): 10 TABLET, FILM COATED ORAL at 13:57

## 2023-12-29 RX ADMIN — RANOLAZINE 500 MILLIGRAM(S): 500 TABLET, FILM COATED, EXTENDED RELEASE ORAL at 06:12

## 2023-12-29 RX ADMIN — Medication 6 MILLIGRAM(S): at 23:13

## 2023-12-29 RX ADMIN — LEVETIRACETAM 250 MILLIGRAM(S): 250 TABLET, FILM COATED ORAL at 17:50

## 2023-12-29 RX ADMIN — Medication 25 MILLIGRAM(S): at 23:13

## 2023-12-29 RX ADMIN — Medication 975 MILLIGRAM(S): at 06:45

## 2023-12-29 RX ADMIN — HEPARIN SODIUM 5000 UNIT(S): 5000 INJECTION INTRAVENOUS; SUBCUTANEOUS at 23:05

## 2023-12-29 RX ADMIN — Medication 25 MILLIGRAM(S): at 08:36

## 2023-12-29 RX ADMIN — Medication 975 MILLIGRAM(S): at 13:56

## 2023-12-29 RX ADMIN — PIPERACILLIN AND TAZOBACTAM 25 GRAM(S): 4; .5 INJECTION, POWDER, LYOPHILIZED, FOR SOLUTION INTRAVENOUS at 06:08

## 2023-12-29 RX ADMIN — PIPERACILLIN AND TAZOBACTAM 25 GRAM(S): 4; .5 INJECTION, POWDER, LYOPHILIZED, FOR SOLUTION INTRAVENOUS at 17:51

## 2023-12-29 RX ADMIN — Medication 975 MILLIGRAM(S): at 14:30

## 2023-12-29 RX ADMIN — MEMANTINE HYDROCHLORIDE 5 MILLIGRAM(S): 10 TABLET ORAL at 17:51

## 2023-12-29 RX ADMIN — HEPARIN SODIUM 5000 UNIT(S): 5000 INJECTION INTRAVENOUS; SUBCUTANEOUS at 06:10

## 2023-12-29 RX ADMIN — TICAGRELOR 60 MILLIGRAM(S): 90 TABLET ORAL at 06:12

## 2023-12-29 RX ADMIN — HEPARIN SODIUM 5000 UNIT(S): 5000 INJECTION INTRAVENOUS; SUBCUTANEOUS at 13:59

## 2023-12-29 RX ADMIN — ATORVASTATIN CALCIUM 80 MILLIGRAM(S): 80 TABLET, FILM COATED ORAL at 23:13

## 2023-12-29 RX ADMIN — Medication 81 MILLIGRAM(S): at 13:58

## 2023-12-29 RX ADMIN — Medication 975 MILLIGRAM(S): at 20:26

## 2023-12-29 NOTE — PROGRESS NOTE ADULT - SUBJECTIVE AND OBJECTIVE BOX
VASCULAR SURGERY PROGRESS NOTE    SUBJECTIVE / 24H EVENTS:  Patient seen and examined on morning rounds. C/O minimal abdominal pain - improving.   No overnight events.       MEDICATIONS  (STANDING):  acetaminophen     Tablet .. 975 milliGRAM(s) Oral every 6 hours  aspirin  chewable 81 milliGRAM(s) Oral daily  atorvastatin 80 milliGRAM(s) Oral at bedtime  chlorhexidine 2% Cloths 1 Application(s) Topical daily  dextrose 50% Injectable 25 Gram(s) IV Push once  dextrose 50% Injectable 12.5 Gram(s) IV Push once  escitalopram 10 milliGRAM(s) Oral daily  heparin   Injectable 5000 Unit(s) SubCutaneous every 8 hours  insulin glargine Injectable (LANTUS) 20 Unit(s) SubCutaneous at bedtime  insulin lispro (ADMELOG) corrective regimen sliding scale   SubCutaneous every 6 hours  levETIRAcetam 250 milliGRAM(s) Oral two times a day  melatonin 6 milliGRAM(s) Oral at bedtime  memantine 5 milliGRAM(s) Oral two times a day  metoprolol tartrate 25 milliGRAM(s) Oral two times a day  piperacillin/tazobactam IVPB.. 3.375 Gram(s) IV Intermittent every 12 hours  ranolazine 500 milliGRAM(s) Oral two times a day  ticagrelor 60 milliGRAM(s) Oral every 12 hours    MEDICATIONS  (PRN):      OBJECTIVE:  ICU Vital Signs Last 24 Hrs  T(C): 37.2 (29 Dec 2023 04:00), Max: 37.2 (29 Dec 2023 04:00)  T(F): 98.9 (29 Dec 2023 04:00), Max: 98.9 (29 Dec 2023 04:00)  HR: 77 (29 Dec 2023 04:00) (72 - 82)  BP: 141/61 (29 Dec 2023 04:00) (122/52 - 175/59)  BP(mean): 83 (29 Dec 2023 04:00) (73 - 101)  ABP: --  ABP(mean): --  RR: 16 (29 Dec 2023 04:00) (14 - 21)  SpO2: 100% (29 Dec 2023 04:00) (96% - 100%)    O2 Parameters below as of 29 Dec 2023 04:00  Patient On (Oxygen Delivery Method): nasal cannula  O2 Flow (L/min): 2    I&O's Summary    28 Dec 2023 07:01  -  29 Dec 2023 07:00  --------------------------------------------------------  IN: 150 mL / OUT: 1000 mL / NET: -850 mL      PHYSICAL EXAM:  Gen: NAD  Resp: Non-labored breathing on RA. No wheezes / rhonchi  Cardiac: S1S2, reg rate  GI: Softly distended, nontender on exam  Ext: Warm, well perfused      LABS:                        8.2    14.07 )-----------( 328      ( 29 Dec 2023 06:28 )             23.9     12-29    138  |  106  |  32<H>  ----------------------------<  95  3.8   |  22  |  1.71<H>    Ca    7.6<L>      29 Dec 2023 06:28  Phos  2.4     12-29  Mg     2.10     12-29    TPro  5.9<L>  /  Alb  2.6<L>  /  TBili  0.8  /  DBili  x   /  AST  27  /  ALT  13  /  AlkPhos  58  12-29                 RADIOLOGY:    < from: US Abdomen Upper Quadrant Right (12.26.23 @ 14:21) >  IMPRESSION:  Limited exam.    Distended gallbladder with cholelithiasis and sludge, and top normal wall   thickness. No focal pain was elicited over the gallbladder during the   exam. If there remains concern for acute cholecystitis, consider further   evaluation with HIDA scan.

## 2023-12-29 NOTE — PROGRESS NOTE ADULT - ASSESSMENT
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) who presents to the hospital for COVID19 infection and chest pain  Abd distention   MABLE   Hypophosphatemia - improving      1 Renal - Creatinine trending down, now off IVF   S/p CT with contrast   S/p KCL 20 mEq po x 1 and phosnak 1 packet po x 1   2 CV - BP elevated, on lopressor 25 mg po bid, If possible start Norvasc if needed   3 Vasc - s/p SMA stent placement;   4 Sx - now on clear liquid diet; s/p HIDA scan 2nd part this morning         Sayed [facility]   [employer] [facility]   [phone]    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

## 2023-12-29 NOTE — PROGRESS NOTE ADULT - ASSESSMENT
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 Surgery consulted on 12/24 for abdominal pain and distension. CTA AP obtained which showed  partially occlusive calcified and non-calcified plaque just distal to take-off of the SMA, with estimated at least 75% luminal narrowing. Limited evaluation of its distal branches. Patient taken emergently to OR on 12/24 with general surgery and vascular surgery. Patient s/p diagnostic laparoscopy and SMA stent placement with brachial cutdown. SICU consulted postoperatively for HD monitoring, now listed for floor.    PLAN:   Neurologic:  -A&Ox2 (baseline per family)   -Continue Keppra- hx of myoclonic jerks   -Pain: Tylenol  -ASA 81 for hx CVA, stents    Respiratory:  -Maintain O2 saturation >92%  -COVID +    Cardiovascular:  -MAP goal >65  -Metoprolol 25 bid and home ranolazine   -ASA 81 qd      Gastrointestinal/Nutrition:  -Diet: NPO  -F/u RUQ US 12/26-> GB distended with cholelithiasis, equivocal   -Trial abx for cholecystitis, no plan for percutaneous cholecystostomy at this time  - HIDA part 1 inconclusive, pending part 2 - if HIDA negative can d/c abx    Genitourinary/Renal:  - Hx of CKD (Baseline Cr 1.2-1.3)   - Monitor strict I/Os     Hematologic:  -DVT ppx: SQH   -ASA 81 PO for hx stents, CVA x3  -Brilinta qd    Infectious Disease:  -Abx: Zosyn through 12/28, s/p remdesivir   -Monitor WBC   -Monitor fever curve     Endocrine:  -T2DM   -ISS, Lantus 20U   -Monitor blood glucose     Disposition: Listed   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 Surgery consulted on 12/24 for abdominal pain and distension. CTA AP obtained which showed  partially occlusive calcified and non-calcified plaque just distal to take-off of the SMA, with estimated at least 75% luminal narrowing. Limited evaluation of its distal branches. Patient taken emergently to OR on 12/24 with general surgery and vascular surgery. Patient s/p diagnostic laparoscopy and SMA stent placement with brachial cutdown. SICU consulted postoperatively for HD monitoring, now listed for floor.    PLAN:   Neurologic:  -A&Ox2 (baseline per family)   -Continue Keppra- hx of myoclonic jerks   -Pain: Tylenol  -ASA 81 for hx CVA, stents  -melatonin 6 qhs  -lexapro 10  -memantine 5 bid    Respiratory:  -Maintain O2 saturation >92%  -COVID +    Cardiovascular:  -MAP goal >65  -Metoprolol 25 bid and home ranolazine   -ASA 81 qd  -Lipitor 80    Gastrointestinal/Nutrition:  -Diet: NPO  -F/u RUQ US 12/26-> GB distended with cholelithiasis, equivocal   -Trial abx for cholecystitis, no plan for percutaneous cholecystostomy at this time  - HIDA part 1 completed, pending part 2 - if HIDA negative can d/c abx    Genitourinary/Renal:  - Hx of CKD (Baseline Cr 1.2-1.3)   - Monitor strict I/Os     Hematologic:  -DVT ppx: SQH   -ASA 81 PO for hx stents, CVA x3  -Brilinta qd    Infectious Disease:  -Abx: Zosyn through 12/28, s/p remdesivir   -Monitor WBC   -Monitor fever curve     Endocrine:  -T2DM   -ISS, Lantus 20U   -Monitor blood glucose     Disposition: Listed

## 2023-12-29 NOTE — PROGRESS NOTE ADULT - SUBJECTIVE AND OBJECTIVE BOX
Aashish Boyer MD  Interventional Cardiology / Advance Heart Failure and Cardiac Transplant Specialist  [place] Office : [address] [place] [address] [place]. [address]  Tel:   [place] Office : [address] [facility] [place] [address]  Tel: [phone]       Pt is lying in bed comfortable not in distress, no chest pains no SOB no palpitations  	  MEDICATIONS:  aspirin  chewable 81 milliGRAM(s) Oral daily  heparin   Injectable 5000 Unit(s) SubCutaneous every 8 hours  metoprolol tartrate 25 milliGRAM(s) Oral two times a day  ranolazine 500 milliGRAM(s) Oral two times a day  ticagrelor 60 milliGRAM(s) Oral every 12 hours    piperacillin/tazobactam IVPB.. 3.375 Gram(s) IV Intermittent every 12 hours      acetaminophen     Tablet .. 975 milliGRAM(s) Oral every 6 hours  escitalopram 10 milliGRAM(s) Oral daily  levETIRAcetam 250 milliGRAM(s) Oral two times a day  melatonin 6 milliGRAM(s) Oral at bedtime  memantine 5 milliGRAM(s) Oral two times a day      atorvastatin 80 milliGRAM(s) Oral at bedtime  dextrose 50% Injectable 25 Gram(s) IV Push once  dextrose 50% Injectable 12.5 Gram(s) IV Push once  insulin glargine Injectable (LANTUS) 20 Unit(s) SubCutaneous at bedtime  insulin lispro (ADMELOG) corrective regimen sliding scale   SubCutaneous every 6 hours    chlorhexidine 2% Cloths 1 Application(s) Topical daily      PAST MEDICAL/SURGICAL HISTORY  PAST MEDICAL & SURGICAL HISTORY:  Hyperlipemia      Hypertension      Coronary Artery Disease      Diabetes Mellitus Type II      Stented Coronary Artery  total 5 stents, last stent 5/2019      Neuropathy      Myocardial infarction      Stroke  mild left facial numbness   no other residuals verbalized      Myoclonic jerking      Stage 3 chronic kidney disease      History of Cataract Extraction      Hx of CABG      H/O coronary angiogram      S/P coronary artery stent placement  1/6/09      S/P placement of cardiac pacemaker          SOCIAL HISTORY: Substance Use (street drugs): ( x ) never used  (  ) other:    FAMILY HISTORY:  No pertinent family history in first degree relatives              PHYSICAL EXAM:  T(C): 36.6 (12-29-23 @ 13:44), Max: 37.2 (12-29-23 @ 04:00)  HR: 71 (12-29-23 @ 16:00) (71 - 82)  BP: 121/52 (12-29-23 @ 16:00) (121/52 - 176/72)  RR: 12 (12-29-23 @ 16:00) (12 - 24)  SpO2: 100% (12-29-23 @ 16:00) (96% - 100%)  Wt(kg): --  I&O's Summary    28 Dec 2023 07:01  -  29 Dec 2023 07:00  --------------------------------------------------------  IN: 150 mL / OUT: 1000 mL / NET: -850 mL    29 Dec 2023 07:01  -  29 Dec 2023 17:50  --------------------------------------------------------  IN: 310 mL / OUT: 250 mL / NET: 60 mL             EYES:   PERRLA   ENMT:   Moist mucous membranes, Good dentition, No lesions  Cardiovascular: Normal S1 S2, No JVD, No murmurs, No edema  Respiratory: Lungs clear to auscultation	  Gastrointestinal:  s/p surgery   Extremities: no edema                                    8.2    14.07 )-----------( 328      ( 29 Dec 2023 06:28 )             23.9     12-29    138  |  106  |  32<H>  ----------------------------<  95  3.8   |  22  |  1.71<H>    Ca    7.6<L>      29 Dec 2023 06:28  Phos  2.4     12-29  Mg     2.10     12-29    TPro  5.9<L>  /  Alb  2.6<L>  /  TBili  0.8  /  DBili  x   /  AST  27  /  ALT  13  /  AlkPhos  58  12-29    proBNP:   Lipid Profile:   HgA1c:   TSH:     Consultant(s) Notes Reviewed:  [x ] YES  [ ] NO    Care Discussed with Consultants/Other Providers [ x] YES  [ ] NO    Imaging Personally Reviewed independently:  [x] YES  [ ] NO    All labs, radiologic studies, vitals, orders and medications list reviewed. Patient is seen and examined at bedside. Case discussed with medical team.         Aashish Boyer MD  Interventional Cardiology / Advance Heart Failure and Cardiac Transplant Specialist  [place] Office : [address] [place] [address] [place]. [address]  Tel:   [place] Office : [address] [facility] [place] [address]  Tel: [phone]       Pt is lying in bed comfortable not in distress, no chest pains no SOB no palpitations  	  MEDICATIONS:  aspirin  chewable 81 milliGRAM(s) Oral daily  heparin   Injectable 5000 Unit(s) SubCutaneous every 8 hours  metoprolol tartrate 25 milliGRAM(s) Oral two times a day  ranolazine 500 milliGRAM(s) Oral two times a day  ticagrelor 60 milliGRAM(s) Oral every 12 hours    piperacillin/tazobactam IVPB.. 3.375 Gram(s) IV Intermittent every 12 hours      acetaminophen     Tablet .. 975 milliGRAM(s) Oral every 6 hours  escitalopram 10 milliGRAM(s) Oral daily  levETIRAcetam 250 milliGRAM(s) Oral two times a day  melatonin 6 milliGRAM(s) Oral at bedtime  memantine 5 milliGRAM(s) Oral two times a day      atorvastatin 80 milliGRAM(s) Oral at bedtime  dextrose 50% Injectable 25 Gram(s) IV Push once  dextrose 50% Injectable 12.5 Gram(s) IV Push once  insulin glargine Injectable (LANTUS) 20 Unit(s) SubCutaneous at bedtime  insulin lispro (ADMELOG) corrective regimen sliding scale   SubCutaneous every 6 hours    chlorhexidine 2% Cloths 1 Application(s) Topical daily      PAST MEDICAL/SURGICAL HISTORY  PAST MEDICAL & SURGICAL HISTORY:  Hyperlipemia      Hypertension      Coronary Artery Disease      Diabetes Mellitus Type II      Stented Coronary Artery  total 5 stents, last stent 5/2019      Neuropathy      Myocardial infarction      Stroke  mild left facial numbness   no other residuals verbalized      Myoclonic jerking      Stage 3 chronic kidney disease      History of Cataract Extraction      Hx of CABG      H/O coronary angiogram      S/P coronary artery stent placement  1/6/09      S/P placement of cardiac pacemaker          SOCIAL HISTORY: Substance Use (street drugs): ( x ) never used  (  ) other:    FAMILY HISTORY:  No pertinent family history in first degree relatives              PHYSICAL EXAM:  T(C): 36.6 (12-29-23 @ 13:44), Max: 37.2 (12-29-23 @ 04:00)  HR: 71 (12-29-23 @ 16:00) (71 - 82)  BP: 121/52 (12-29-23 @ 16:00) (121/52 - 176/72)  RR: 12 (12-29-23 @ 16:00) (12 - 24)  SpO2: 100% (12-29-23 @ 16:00) (96% - 100%)  Wt(kg): --  I&O's Summary    28 Dec 2023 07:01  -  29 Dec 2023 07:00  --------------------------------------------------------  IN: 150 mL / OUT: 1000 mL / NET: -850 mL    29 Dec 2023 07:01  -  29 Dec 2023 17:50  --------------------------------------------------------  IN: 310 mL / OUT: 250 mL / NET: 60 mL             EYES:   PERRLA   ENMT:   Moist mucous membranes, Good dentition, No lesions  Cardiovascular: Normal S1 S2, No JVD, No murmurs, No edema  Respiratory: Lungs clear to auscultation	  Gastrointestinal:  s/p surgery   Extremities: no edema                                    8.2    14.07 )-----------( 328      ( 29 Dec 2023 06:28 )             23.9     12-29    138  |  106  |  32<H>  ----------------------------<  95  3.8   |  22  |  1.71<H>    Ca    7.6<L>      29 Dec 2023 06:28  Phos  2.4     12-29  Mg     2.10     12-29    TPro  5.9<L>  /  Alb  2.6<L>  /  TBili  0.8  /  DBili  x   /  AST  27  /  ALT  13  /  AlkPhos  58  12-29    proBNP:   Lipid Profile:   HgA1c:   TSH:     Consultant(s) Notes Reviewed:  [x ] YES  [ ] NO    Care Discussed with Consultants/Other Providers [ x] YES  [ ] NO    Imaging Personally Reviewed independently:  [x] YES  [ ] NO    All labs, radiologic studies, vitals, orders and medications list reviewed. Patient is seen and examined at bedside. Case discussed with medical team.

## 2023-12-29 NOTE — PROGRESS NOTE ADULT - SUBJECTIVE AND OBJECTIVE BOX
NEPHROLOGY    Patient seen and examined resting comfortably, no sob, in no acute distress.     MEDICATIONS  (STANDING):  acetaminophen     Tablet .. 975 milliGRAM(s) Oral every 6 hours  aspirin  chewable 81 milliGRAM(s) Oral daily  atorvastatin 80 milliGRAM(s) Oral at bedtime  chlorhexidine 2% Cloths 1 Application(s) Topical daily  dextrose 50% Injectable 25 Gram(s) IV Push once  dextrose 50% Injectable 12.5 Gram(s) IV Push once  escitalopram 10 milliGRAM(s) Oral daily  heparin   Injectable 5000 Unit(s) SubCutaneous every 8 hours  insulin glargine Injectable (LANTUS) 20 Unit(s) SubCutaneous at bedtime  insulin lispro (ADMELOG) corrective regimen sliding scale   SubCutaneous every 6 hours  levETIRAcetam 250 milliGRAM(s) Oral two times a day  melatonin 6 milliGRAM(s) Oral at bedtime  memantine 5 milliGRAM(s) Oral two times a day  metoprolol tartrate 25 milliGRAM(s) Oral two times a day  piperacillin/tazobactam IVPB.. 3.375 Gram(s) IV Intermittent every 12 hours  ranolazine 500 milliGRAM(s) Oral two times a day  ticagrelor 60 milliGRAM(s) Oral every 12 hours    VITALS:  T(C): , Max: 37.2 (12-29-23 @ 04:00)  T(F): , Max: 98.9 (12-29-23 @ 04:00)  HR: 76 (12-29-23 @ 08:10)  BP: 155/61 (12-29-23 @ 08:10)  BP(mean): 88 (12-29-23 @ 08:10)  RR: 14 (12-29-23 @ 08:10)  SpO2: 99% (12-29-23 @ 08:10)    I and O's:    12-28 @ 07:01  -  12-29 @ 07:00  --------------------------------------------------------  IN: 150 mL / OUT: 1000 mL / NET: -850 mL    12-29 @ 07:01  -  12-29 @ 11:41  --------------------------------------------------------  IN: 30 mL / OUT: 100 mL / NET: -70 mL    PHYSICAL EXAM:  Constitutional: NAD.  HEENT: EOMI  Neck:  No JVD, supple   Respiratory: CTA B/L  Cardiovascular: S1 and S2, RRR  Gastrointestinal: + BS   Extremities: No peripheral edema, + peripheral pulses  Neurological:   Psychiatric: Normal mood, normal affect  : no Moss  Skin: No rashes, C/D/I    LABS:                        8.2    14.07 )-----------( 328      ( 29 Dec 2023 06:28 )             23.9     12-29    138  |  106  |  32<H>  ----------------------------<  95  3.8   |  22  |  1.71<H>    Ca    7.6<L>      29 Dec 2023 06:28  Phos  2.4     12-29  Mg     2.10     12-29    TPro  5.9<L>  /  Alb  2.6<L>  /  TBili  0.8  /  DBili  x   /  AST  27  /  ALT  13  /  AlkPhos  58  12-29    RADIOLOGY & ADDITIONAL STUDIES:  < from: [facility] Hepatobiliary Imaging (12.29.23 @ 09:22) >  IMPRESSION: Abnormal hepatobiliary scan.    Nonvisualization of the gallbladder. In the proper clinical setting,   findings are compatible with acute cholecystitis. To increase the   specificity of this finding, the study may be repeated with Morphine, if   necessary.    Preliminary findings were discussed with Dr. Ann  by Dr. Shay on   12/28/2023 9:40 PM with read back confirmation. Final report was   discussed with DADA Munoz at 9:30 am on 12/29/2023.    --- End of Report ---  STEFFANY SHAY MD; Resident Radiologist  This document has been electronically signed.  NOLAN REDMOND MD; Attending Nuclear Medicine  This document has been electronically signed. 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     MEDICATIONS  (STANDING):  acetaminophen     Tablet .. 975 milliGRAM(s) Oral every 6 hours.  aspirin  chewable 81 milliGRAM(s) Oral daily  atorvastatin 80 milliGRAM(s) Oral at bedtime  chlorhexidine 2% Cloths 1 Application(s) Topical daily  dextrose 50% Injectable 25 Gram(s) IV Push once  dextrose 50% Injectable 12.5 Gram(s) IV Push once  escitalopram 10 milliGRAM(s) Oral daily  heparin   Injectable 5000 Unit(s) SubCutaneous every 8 hours  insulin glargine Injectable (LANTUS) 20 Unit(s) SubCutaneous at bedtime  insulin lispro (ADMELOG) corrective regimen sliding scale   SubCutaneous every 6 hours  levETIRAcetam 250 milliGRAM(s) Oral two times a day  melatonin 6 milliGRAM(s) Oral at bedtime  memantine 5 milliGRAM(s) Oral two times a day.  metoprolol tartrate 25 milliGRAM(s) Oral two times a day  piperacillin/tazobactam IVPB.. 3.375 Gram(s) IV Intermittent every 12 hours  ranolazine 500 milliGRAM(s) Oral two times a day  ticagrelor 60 milliGRAM(s) Oral every 12 hours    VITALS:  T(C): , Max: 37.2 (12-29-23 @ 04:00)  T(F): , Max: 98.9 (12-29-23 @ 04:00)  HR: 76 (12-29-23 @ 08:10)  BP: 155/61 (12-29-23 @ 08:10)  BP(mean): 88 (12-29-23 @ 08:10)  RR: 14 (12-29-23 @ 08:10)  SpO2: 99% (12-29-23 @ 08:10)    I and O's:    12-28 @ 07:01  -  12-29 @ 07:00  --------------------------------------------------------  IN: 150 mL / OUT: 1000 mL / NET: -850 mL    12-29 @ 07:01 - 12-29 @ 11:41  --------------------------------------------------------  IN: 30 mL / OUT: 100 mL / NET: -70 mL    PHYSICAL EXAM:  Constitutional: NAD.  HEENT: EOMI  Neck:  No JVD, supple   Respiratory: CTA B/L  Cardiovascular: S1 and S2, RRR  Gastrointestinal: + BS   Extremities: No peripheral edema, + peripheral pulses  Neurological:   Psychiatric: Normal mood, normal affect  : no Moss  Skin: No rashes, C/D/I    LABS:                        8.2    14.07 )-----------( 328      ( 29 Dec 2023 06:28 )             23.9     12-29    138  |  106  |  32<H>  ----------------------------<  95  3.8   |  22  |  1.71<H>    Ca    7.6<L>      29 Dec 2023 06:28  Phos  2.4     12-29  Mg     2.10     12-29    TPro  5.9<L>  /  Alb  2.6<L>  /  TBili  0.8  /  DBili  x   /  AST  27  /  ALT  13  /  AlkPhos  58  12-29    RADIOLOGY & ADDITIONAL STUDIES:  < from: [facility] Hepatobiliary Imaging (12.29.23 @ 09:22) >  IMPRESSION: Abnormal hepatobiliary scan.    Nonvisualization of the gallbladder. In the proper clinical setting,   findings are compatible with acute cholecystitis. To increase the   specificity of this finding, the study may be repeated with Morphine, if   necessary.    Preliminary findings were discussed with Dr. Ann  by Dr. Shay on   12/28/2023 9:40 PM with read back confirmation. Final report was   discussed with DADA Munoz at 9:30 am on 12/29/2023.    --- End of Report ---  STEFFANY SHAY MD; Resident Radiologist  This document has been electronically signed.  NOLAN REDMOND MD; Attending Nuclear Medicine  This document has been electronically signed. Dec 29 2023  9:54AM    < end of copied text >

## 2023-12-29 NOTE — PROGRESS NOTE ADULT - ASSESSMENT
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 CTA demonstrating mesenteric fat stranding associated with ascending/transverse colon. S/p SMA angiography with stent placement with diagnostic laparoscopy 12/24, small bowel and visible colon viable, some inflammation of omentum in RUQ.     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 CTA demonstrating mesenteric fat stranding associated with ascending/transverse colon. S/p SMA angiography with stent placement with diagnostic laparoscopy 12/24, small bowel and visible colon viable, some inflammation of omentum in RUQ.     Plan:   - Pain control PRN  - Awaiting second scan of HIDA as per gen surgery today  - Advance diet as tolerated, pending studies today  - Appreciate excellent care per SICU           C Team Surgery   [id number]

## 2023-12-29 NOTE — PROGRESS NOTE ADULT - ATTENDING COMMENTS
advance diet as tolerated  clinically stable  follow up results of staged HIDA scan today  dispo to floor

## 2023-12-29 NOTE — PROGRESS NOTE ADULT - ATTENDING COMMENTS
I agree with the detailed interval history, physical, and plan, which I have reviewed and edited where appropriate'; also agree with notes/assessment with my team on service.  I have personally examined the patient.  I was physically present for the key portions of the evaluation and management (E/M) service provided.  I reviewed all the pertinent data.  The patient is a critical care patient with life threatening hemodynamic and metabolic instability in SICU.  The SICU team has a constant risk benefit analyzes discussion and coordinating care with the primary team and all consultants.   The patient is in SICU with the chief complaint and diagnosis mentioned in the note.   The plan will be specified in the note.  89M s/p diagnostic laparoscopy and SMA stent placement with brachial cutdown in SICU for HD monitoring.   PHYSICAL EXAM:  Gen: NAD  Resp: clear  Cardiac: RR  Abd: soft  Extremities: warm  PLAN:   Neurologic:  - Keppra  - Tylenol  Respiratory:  -RA  Cardiovascular:  -MAP goal >65  Gastrointestinal/Nutrition:  -Advance Diet   Genitourinary/Renal:  -dc Moss   Hematologic:  - SQH   - ASA   Infectious Disease:  -Zosyn   Endocrine:  -ISS  -Lantus  -Monitor glucose   Disposition: dc floor

## 2023-12-29 NOTE — PROGRESS NOTE ADULT - SUBJECTIVE AND OBJECTIVE BOX
SICU Daily Progress Note  =====================================================  Interval/Overnight Events:       - part 1 HIDA inconclusive- kept NPO for HIDA part 2 today   - melatonin started at night    PAST MEDICAL & SURGICAL HISTORY:  Hyperlipemia  Hypertension  Coronary Artery Disease  Diabetes Mellitus Type II  Stented Coronary Artery  total 5 stents, last stent 5/2019  Neuropathy  Myocardial infarction  Stroke  mild left facial numbness   no other residuals verbalized  Myoclonic jerking  Stage 3 chronic kidney disease  History of Cataract Extraction  Hx of CABG  H/O coronary angiogram  S/P coronary artery stent placement  1/6/09  S/P placement of cardiac pacemaker      ALLERGIES:  fluoroquinolone antibiotics (Other)  Cipro (Unknown)  Tegretol (Unknown)  carbamazepine (Other)      --------------------------------------------------------------------------------------    MEDICATIONS:    Neurologic Medications  acetaminophen     Tablet .. 975 milliGRAM(s) Oral every 6 hours  escitalopram 10 milliGRAM(s) Oral daily  levETIRAcetam 250 milliGRAM(s) Oral two times a day  memantine 5 milliGRAM(s) Oral two times a day    Respiratory Medications    Cardiovascular Medications  metoprolol tartrate 25 milliGRAM(s) Oral two times a day  ranolazine 500 milliGRAM(s) Oral two times a day    Gastrointestinal Medications    Genitourinary Medications    Hematologic/Oncologic Medications  aspirin  chewable 81 milliGRAM(s) Oral daily  heparin   Injectable 5000 Unit(s) SubCutaneous every 8 hours  ticagrelor 60 milliGRAM(s) Oral every 12 hours    Antimicrobial/Immunologic Medications  piperacillin/tazobactam IVPB.. 3.375 Gram(s) IV Intermittent every 12 hours    Endocrine/Metabolic Medications  atorvastatin 80 milliGRAM(s) Oral at bedtime  dextrose 50% Injectable 25 Gram(s) IV Push once  dextrose 50% Injectable 12.5 Gram(s) IV Push once  insulin glargine Injectable (LANTUS) 20 Unit(s) SubCutaneous at bedtime  insulin lispro (ADMELOG) corrective regimen sliding scale   SubCutaneous every 6 hours    Topical/Other Medications  chlorhexidine 2% Cloths 1 Application(s) Topical daily    --------------------------------------------------------------------------------------    VITAL SIGNS:  ICU Vital Signs Last 24 Hrs  T(C): 36 (29 Dec 2023 00:00), Max: 37.1 (28 Dec 2023 21:30)  T(F): 96.8 (29 Dec 2023 00:00), Max: 98.7 (28 Dec 2023 21:30)  HR: 76 (29 Dec 2023 00:00) (72 - 82)  BP: 142/59 (29 Dec 2023 00:00) (122/52 - 175/59)  BP(mean): 82 (29 Dec 2023 00:00) (73 - 101)  RR: 14 (29 Dec 2023 00:00) (14 - 26)  SpO2: 100% (29 Dec 2023 00:00) (96% - 100%)    O2 Parameters below as of 29 Dec 2023 00:00  Patient On (Oxygen Delivery Method): nasal cannula  O2 Flow (L/min): 4      --------------------------------------------------------------------------------------    INS AND OUTS:  I&O's Detail    27 Dec 2023 07:01  -  28 Dec 2023 07:00  --------------------------------------------------------  IN:    dextrose 5% + lactated ringers: 400 mL    Oral Fluid: 200 mL  Total IN: 600 mL    OUT:    Indwelling Catheter - Urethral (mL): 190 mL    Voided (mL): 450 mL  Total OUT: 640 mL    Total NET: -40 mL      28 Dec 2023 07:01  -  29 Dec 2023 00:09  --------------------------------------------------------  IN:  Total IN: 0 mL    OUT:    Voided (mL): 700 mL  Total OUT: 700 mL    Total NET: -700 mL    --------------------------------------------------------------------------------------    EXAM  NEUROLOGY  Exam: Normal, in no acute distress.  Alert and oriented x2.     RESPIRATORY  Exam: Normal expansion/effort.     CARDIOVASCULAR  Exam: S1, S2.  Regular rate and rhythm.       GI/NUTRITION  Exam: Abdomen soft, Non-tender, softly distended. incision sites with intact   Current Diet: NPO    MUSCULOSKELETAL  Exam: All extremities moving spontaneously        HEMATOLOGIC  [x] VTE Prophylaxis: aspirin  chewable 81 milliGRAM(s) Oral daily  heparin   Injectable 5000 Unit(s) SubCutaneous every 8 hours  ticagrelor 60 milliGRAM(s) Oral every 12 hours    INFECTIOUS DISEASE  Antimicrobials/Immunologic Medications:  piperacillin/tazobactam IVPB.. 3.375 Gram(s) IV Intermittent every 12 hours      --------------------------------------------------------------------------------------    LABS                          7.6    16.07 )-----------( 252      ( 28 Dec 2023 00:48 )             20.7     12-28    137  |  105  |  35<H>  ----------------------------<  212<H>  3.6   |  19<L>  |  1.78<H>    Ca    8.0<L>      28 Dec 2023 00:48  Phos  2.2     12-28  Mg     1.90     12-28    TPro  5.9<L>  /  Alb  2.8<L>  /  TBili  0.9  /  DBili  x   /  AST  23  /  ALT  10  /  AlkPhos  58  12-28    --------------------------------------------------------------------------------------     SICU Daily Progress Note  =====================================================  Interval events:  - kept NPO for HIDA part 2 tomorrow.   - melatonin started     PAST MEDICAL & SURGICAL HISTORY:  Hyperlipemia  Hypertension  Coronary Artery Disease  Diabetes Mellitus Type II  Stented Coronary Artery  total 5 stents, last stent 5/2019  Neuropathy  Myocardial infarction  Stroke  mild left facial numbness   no other residuals verbalized  Myoclonic jerking  Stage 3 chronic kidney disease  History of Cataract Extraction  Hx of CABG  H/O coronary angiogram  S/P coronary artery stent placement  1/6/09  S/P placement of cardiac pacemaker      ALLERGIES:  fluoroquinolone antibiotics (Other)  Cipro (Unknown)  Tegretol (Unknown)  carbamazepine (Other)      --------------------------------------------------------------------------------------    MEDICATIONS:    Neurologic Medications  acetaminophen     Tablet .. 975 milliGRAM(s) Oral every 6 hours  escitalopram 10 milliGRAM(s) Oral daily  levETIRAcetam 250 milliGRAM(s) Oral two times a day  memantine 5 milliGRAM(s) Oral two times a day    Respiratory Medications    Cardiovascular Medications  metoprolol tartrate 25 milliGRAM(s) Oral two times a day  ranolazine 500 milliGRAM(s) Oral two times a day    Gastrointestinal Medications    Genitourinary Medications    Hematologic/Oncologic Medications  aspirin  chewable 81 milliGRAM(s) Oral daily  heparin   Injectable 5000 Unit(s) SubCutaneous every 8 hours  ticagrelor 60 milliGRAM(s) Oral every 12 hours    Antimicrobial/Immunologic Medications  piperacillin/tazobactam IVPB.. 3.375 Gram(s) IV Intermittent every 12 hours    Endocrine/Metabolic Medications  atorvastatin 80 milliGRAM(s) Oral at bedtime  dextrose 50% Injectable 25 Gram(s) IV Push once  dextrose 50% Injectable 12.5 Gram(s) IV Push once  insulin glargine Injectable (LANTUS) 20 Unit(s) SubCutaneous at bedtime  insulin lispro (ADMELOG) corrective regimen sliding scale   SubCutaneous every 6 hours    Topical/Other Medications  chlorhexidine 2% Cloths 1 Application(s) Topical daily    --------------------------------------------------------------------------------------    VITAL SIGNS:  ICU Vital Signs Last 24 Hrs  T(C): 36 (29 Dec 2023 00:00), Max: 37.1 (28 Dec 2023 21:30)  T(F): 96.8 (29 Dec 2023 00:00), Max: 98.7 (28 Dec 2023 21:30)  HR: 76 (29 Dec 2023 00:00) (72 - 82)  BP: 142/59 (29 Dec 2023 00:00) (122/52 - 175/59)  BP(mean): 82 (29 Dec 2023 00:00) (73 - 101)  RR: 14 (29 Dec 2023 00:00) (14 - 26)  SpO2: 100% (29 Dec 2023 00:00) (96% - 100%)    O2 Parameters below as of 29 Dec 2023 00:00  Patient On (Oxygen Delivery Method): nasal cannula  O2 Flow (L/min): 4      --------------------------------------------------------------------------------------    INS AND OUTS:  I&O's Detail    27 Dec 2023 07:01  -  28 Dec 2023 07:00  --------------------------------------------------------  IN:    dextrose 5% + lactated ringers: 400 mL    Oral Fluid: 200 mL  Total IN: 600 mL    OUT:    Indwelling Catheter - Urethral (mL): 190 mL    Voided (mL): 450 mL  Total OUT: 640 mL    Total NET: -40 mL      28 Dec 2023 07:01  -  29 Dec 2023 00:09  --------------------------------------------------------  IN:  Total IN: 0 mL    OUT:    Voided (mL): 700 mL  Total OUT: 700 mL    Total NET: -700 mL    --------------------------------------------------------------------------------------    EXAM  NEUROLOGY  Exam: Normal, in no acute distress.  Alert and oriented x2.     RESPIRATORY  Exam: Normal expansion/effort.     CARDIOVASCULAR  Exam: Regular rate and rhythm.       GI/NUTRITION  Exam: Abdomen soft, Non-tender, softly distended. incision sites with intact   Current Diet: NPO    MUSCULOSKELETAL  Exam: All extremities moving spontaneously        HEMATOLOGIC  [x] VTE Prophylaxis: aspirin  chewable 81 milliGRAM(s) Oral daily  heparin   Injectable 5000 Unit(s) SubCutaneous every 8 hours  ticagrelor 60 milliGRAM(s) Oral every 12 hours    INFECTIOUS DISEASE  Antimicrobials/Immunologic Medications:  piperacillin/tazobactam IVPB.. 3.375 Gram(s) IV Intermittent every 12 hours      --------------------------------------------------------------------------------------    LABS                          7.6    16.07 )-----------( 252      ( 28 Dec 2023 00:48 )             20.7     12-28    137  |  105  |  35<H>  ----------------------------<  212<H>  3.6   |  19<L>  |  1.78<H>    Ca    8.0<L>      28 Dec 2023 00:48  Phos  2.2     12-28  Mg     1.90     12-28    TPro  5.9<L>  /  Alb  2.8<L>  /  TBili  0.9  /  DBili  x   /  AST  23  /  ALT  10  /  AlkPhos  58  12-28    --------------------------------------------------------------------------------------

## 2023-12-30 LAB
ALBUMIN SERPL ELPH-MCNC: 2.3 G/DL — LOW (ref 3.3–5)
ALBUMIN SERPL ELPH-MCNC: 2.3 G/DL — LOW (ref 3.3–5)
ALP SERPL-CCNC: 64 U/L — SIGNIFICANT CHANGE UP (ref 40–120)
ALP SERPL-CCNC: 64 U/L — SIGNIFICANT CHANGE UP (ref 40–120)
ALT FLD-CCNC: 13 U/L — SIGNIFICANT CHANGE UP (ref 4–41)
ALT FLD-CCNC: 13 U/L — SIGNIFICANT CHANGE UP (ref 4–41)
ANION GAP SERPL CALC-SCNC: 13 MMOL/L — SIGNIFICANT CHANGE UP (ref 7–14)
ANION GAP SERPL CALC-SCNC: 13 MMOL/L — SIGNIFICANT CHANGE UP (ref 7–14)
AST SERPL-CCNC: 29 U/L — SIGNIFICANT CHANGE UP (ref 4–40)
AST SERPL-CCNC: 29 U/L — SIGNIFICANT CHANGE UP (ref 4–40)
BILIRUB SERPL-MCNC: 0.8 MG/DL — SIGNIFICANT CHANGE UP (ref 0.2–1.2)
BILIRUB SERPL-MCNC: 0.8 MG/DL — SIGNIFICANT CHANGE UP (ref 0.2–1.2)
BUN SERPL-MCNC: 30 MG/DL — HIGH (ref 7–23)
BUN SERPL-MCNC: 30 MG/DL — HIGH (ref 7–23)
CALCIUM SERPL-MCNC: 7.6 MG/DL — LOW (ref 8.4–10.5)
CALCIUM SERPL-MCNC: 7.6 MG/DL — LOW (ref 8.4–10.5)
CHLORIDE SERPL-SCNC: 106 MMOL/L — SIGNIFICANT CHANGE UP (ref 98–107)
CHLORIDE SERPL-SCNC: 106 MMOL/L — SIGNIFICANT CHANGE UP (ref 98–107)
CO2 SERPL-SCNC: 17 MMOL/L — LOW (ref 22–31)
CO2 SERPL-SCNC: 17 MMOL/L — LOW (ref 22–31)
CREAT SERPL-MCNC: 1.78 MG/DL — HIGH (ref 0.5–1.3)
CREAT SERPL-MCNC: 1.78 MG/DL — HIGH (ref 0.5–1.3)
EGFR: 36 ML/MIN/1.73M2 — LOW
EGFR: 36 ML/MIN/1.73M2 — LOW
GLUCOSE SERPL-MCNC: 65 MG/DL — LOW (ref 70–99)
GLUCOSE SERPL-MCNC: 65 MG/DL — LOW (ref 70–99)
HCT VFR BLD CALC: 22.8 % — LOW (ref 39–50)
HCT VFR BLD CALC: 22.8 % — LOW (ref 39–50)
HGB BLD-MCNC: 8 G/DL — LOW (ref 13–17)
HGB BLD-MCNC: 8 G/DL — LOW (ref 13–17)
MAGNESIUM SERPL-MCNC: 2 MG/DL — SIGNIFICANT CHANGE UP (ref 1.6–2.6)
MAGNESIUM SERPL-MCNC: 2 MG/DL — SIGNIFICANT CHANGE UP (ref 1.6–2.6)
MCHC RBC-ENTMCNC: 30.4 PG — SIGNIFICANT CHANGE UP (ref 27–34)
MCHC RBC-ENTMCNC: 30.4 PG — SIGNIFICANT CHANGE UP (ref 27–34)
MCHC RBC-ENTMCNC: 35.1 GM/DL — SIGNIFICANT CHANGE UP (ref 32–36)
MCHC RBC-ENTMCNC: 35.1 GM/DL — SIGNIFICANT CHANGE UP (ref 32–36)
MCV RBC AUTO: 86.7 FL — SIGNIFICANT CHANGE UP (ref 80–100)
MCV RBC AUTO: 86.7 FL — SIGNIFICANT CHANGE UP (ref 80–100)
NRBC # BLD: 0 /100 WBCS — SIGNIFICANT CHANGE UP (ref 0–0)
NRBC # BLD: 0 /100 WBCS — SIGNIFICANT CHANGE UP (ref 0–0)
NRBC # FLD: 0 K/UL — SIGNIFICANT CHANGE UP (ref 0–0)
NRBC # FLD: 0 K/UL — SIGNIFICANT CHANGE UP (ref 0–0)
PHOSPHATE SERPL-MCNC: 2.4 MG/DL — LOW (ref 2.5–4.5)
PHOSPHATE SERPL-MCNC: 2.4 MG/DL — LOW (ref 2.5–4.5)
PLATELET # BLD AUTO: 326 K/UL — SIGNIFICANT CHANGE UP (ref 150–400)
PLATELET # BLD AUTO: 326 K/UL — SIGNIFICANT CHANGE UP (ref 150–400)
POTASSIUM SERPL-MCNC: 4.1 MMOL/L — SIGNIFICANT CHANGE UP (ref 3.5–5.3)
POTASSIUM SERPL-MCNC: 4.1 MMOL/L — SIGNIFICANT CHANGE UP (ref 3.5–5.3)
POTASSIUM SERPL-SCNC: 4.1 MMOL/L — SIGNIFICANT CHANGE UP (ref 3.5–5.3)
POTASSIUM SERPL-SCNC: 4.1 MMOL/L — SIGNIFICANT CHANGE UP (ref 3.5–5.3)
PROT SERPL-MCNC: 6.1 G/DL — SIGNIFICANT CHANGE UP (ref 6–8.3)
PROT SERPL-MCNC: 6.1 G/DL — SIGNIFICANT CHANGE UP (ref 6–8.3)
RBC # BLD: 2.63 M/UL — LOW (ref 4.2–5.8)
RBC # BLD: 2.63 M/UL — LOW (ref 4.2–5.8)
RBC # FLD: 15.5 % — HIGH (ref 10.3–14.5)
RBC # FLD: 15.5 % — HIGH (ref 10.3–14.5)
SODIUM SERPL-SCNC: 136 MMOL/L — SIGNIFICANT CHANGE UP (ref 135–145)
SODIUM SERPL-SCNC: 136 MMOL/L — SIGNIFICANT CHANGE UP (ref 135–145)
WBC # BLD: 16.31 K/UL — HIGH (ref 3.8–10.5)
WBC # BLD: 16.31 K/UL — HIGH (ref 3.8–10.5)
WBC # FLD AUTO: 16.31 K/UL — HIGH (ref 3.8–10.5)
WBC # FLD AUTO: 16.31 K/UL — HIGH (ref 3.8–10.5)

## 2023-12-30 PROCEDURE — 99232 SBSQ HOSP IP/OBS MODERATE 35: CPT | Mod: GC

## 2023-12-30 RX ORDER — INSULIN GLARGINE 100 [IU]/ML
12 INJECTION, SOLUTION SUBCUTANEOUS AT BEDTIME
Refills: 0 | Status: DISCONTINUED | OUTPATIENT
Start: 2023-12-30 | End: 2023-12-31

## 2023-12-30 RX ORDER — INSULIN LISPRO 100/ML
VIAL (ML) SUBCUTANEOUS AT BEDTIME
Refills: 0 | Status: DISCONTINUED | OUTPATIENT
Start: 2023-12-30 | End: 2023-12-31

## 2023-12-30 RX ORDER — PANTOPRAZOLE SODIUM 20 MG/1
40 TABLET, DELAYED RELEASE ORAL DAILY
Refills: 0 | Status: DISCONTINUED | OUTPATIENT
Start: 2023-12-30 | End: 2024-01-01

## 2023-12-30 RX ORDER — INSULIN LISPRO 100/ML
VIAL (ML) SUBCUTANEOUS
Refills: 0 | Status: DISCONTINUED | OUTPATIENT
Start: 2023-12-30 | End: 2023-12-31

## 2023-12-30 RX ADMIN — Medication 25 MILLIGRAM(S): at 22:23

## 2023-12-30 RX ADMIN — PIPERACILLIN AND TAZOBACTAM 25 GRAM(S): 4; .5 INJECTION, POWDER, LYOPHILIZED, FOR SOLUTION INTRAVENOUS at 05:47

## 2023-12-30 RX ADMIN — ESCITALOPRAM OXALATE 10 MILLIGRAM(S): 10 TABLET, FILM COATED ORAL at 11:57

## 2023-12-30 RX ADMIN — TICAGRELOR 60 MILLIGRAM(S): 90 TABLET ORAL at 18:05

## 2023-12-30 RX ADMIN — Medication 63.75 MILLIMOLE(S): at 10:03

## 2023-12-30 RX ADMIN — HEPARIN SODIUM 5000 UNIT(S): 5000 INJECTION INTRAVENOUS; SUBCUTANEOUS at 14:54

## 2023-12-30 RX ADMIN — Medication 81 MILLIGRAM(S): at 11:57

## 2023-12-30 RX ADMIN — MEMANTINE HYDROCHLORIDE 5 MILLIGRAM(S): 10 TABLET ORAL at 05:46

## 2023-12-30 RX ADMIN — Medication 975 MILLIGRAM(S): at 05:44

## 2023-12-30 RX ADMIN — HEPARIN SODIUM 5000 UNIT(S): 5000 INJECTION INTRAVENOUS; SUBCUTANEOUS at 05:47

## 2023-12-30 RX ADMIN — MEMANTINE HYDROCHLORIDE 5 MILLIGRAM(S): 10 TABLET ORAL at 18:05

## 2023-12-30 RX ADMIN — Medication 975 MILLIGRAM(S): at 18:05

## 2023-12-30 RX ADMIN — HEPARIN SODIUM 5000 UNIT(S): 5000 INJECTION INTRAVENOUS; SUBCUTANEOUS at 22:23

## 2023-12-30 RX ADMIN — PIPERACILLIN AND TAZOBACTAM 25 GRAM(S): 4; .5 INJECTION, POWDER, LYOPHILIZED, FOR SOLUTION INTRAVENOUS at 18:05

## 2023-12-30 RX ADMIN — CHLORHEXIDINE GLUCONATE 1 APPLICATION(S): 213 SOLUTION TOPICAL at 12:01

## 2023-12-30 RX ADMIN — Medication 975 MILLIGRAM(S): at 11:56

## 2023-12-30 RX ADMIN — ATORVASTATIN CALCIUM 80 MILLIGRAM(S): 80 TABLET, FILM COATED ORAL at 22:23

## 2023-12-30 RX ADMIN — Medication 25 MILLIGRAM(S): at 10:03

## 2023-12-30 RX ADMIN — LEVETIRACETAM 250 MILLIGRAM(S): 250 TABLET, FILM COATED ORAL at 05:47

## 2023-12-30 RX ADMIN — Medication 6 MILLIGRAM(S): at 22:23

## 2023-12-30 RX ADMIN — PANTOPRAZOLE SODIUM 40 MILLIGRAM(S): 20 TABLET, DELAYED RELEASE ORAL at 20:27

## 2023-12-30 RX ADMIN — RANOLAZINE 500 MILLIGRAM(S): 500 TABLET, FILM COATED, EXTENDED RELEASE ORAL at 05:47

## 2023-12-30 RX ADMIN — INSULIN GLARGINE 12 UNIT(S): 100 INJECTION, SOLUTION SUBCUTANEOUS at 22:23

## 2023-12-30 RX ADMIN — LEVETIRACETAM 250 MILLIGRAM(S): 250 TABLET, FILM COATED ORAL at 18:05

## 2023-12-30 RX ADMIN — RANOLAZINE 500 MILLIGRAM(S): 500 TABLET, FILM COATED, EXTENDED RELEASE ORAL at 18:05

## 2023-12-30 RX ADMIN — Medication 1: at 16:53

## 2023-12-30 RX ADMIN — TICAGRELOR 60 MILLIGRAM(S): 90 TABLET ORAL at 05:46

## 2023-12-30 NOTE — PROGRESS NOTE ADULT - SUBJECTIVE AND OBJECTIVE BOX
NEPHROLOGY     Patient seen and examined with family at bedside, resting comfortably on room air, in no acute distress.     MEDICATIONS  (STANDING):  acetaminophen     Tablet .. 975 milliGRAM(s) Oral every 6 hours  aspirin  chewable 81 milliGRAM(s) Oral daily  atorvastatin 80 milliGRAM(s) Oral at bedtime  chlorhexidine 2% Cloths 1 Application(s) Topical daily  dextrose 50% Injectable 25 Gram(s) IV Push once  dextrose 50% Injectable 12.5 Gram(s) IV Push once  escitalopram 10 milliGRAM(s) Oral daily  heparin   Injectable 5000 Unit(s) SubCutaneous every 8 hours  insulin glargine Injectable (LANTUS) 20 Unit(s) SubCutaneous at bedtime  insulin lispro (ADMELOG) corrective regimen sliding scale   SubCutaneous every 6 hours  levETIRAcetam 250 milliGRAM(s) Oral two times a day  melatonin 6 milliGRAM(s) Oral at bedtime  memantine 5 milliGRAM(s) Oral two times a day  metoprolol tartrate 25 milliGRAM(s) Oral two times a day  piperacillin/tazobactam IVPB.. 3.375 Gram(s) IV Intermittent every 12 hours  ranolazine 500 milliGRAM(s) Oral two times a day  ticagrelor 60 milliGRAM(s) Oral every 12 hours    VITALS:  T(C): , Max: 36.6 (12-29-23 @ 13:44)  T(F): , Max: 97.9 (12-29-23 @ 13:44)  HR: 71 (12-30-23 @ 10:04)  BP: 118/47 (12-30-23 @ 10:04)  BP(mean): 69 (12-30-23 @ 10:04)  RR: 15 (12-30-23 @ 10:04)  SpO2: 97% (12-30-23 @ 10:04)    I and O's:    12-29 @ 07:01  -  12-30 @ 07:00  --------------------------------------------------------  IN: 430 mL / OUT: 750 mL / NET: -320 mL    PHYSICAL EXAM:  Constitutional: NAD.  HEENT: EOMI  Neck:  No JVD, supple   Respiratory: CTA B/L  Cardiovascular: S1 and S2, RRR  Gastrointestinal: + BS   Extremities: No peripheral edema, + peripheral pulses  Neurological:   Psychiatric: Normal mood, normal affect  : no Moss  Skin: No rashes, C/D/I    LABS:                        8.0    16.31 )-----------( 326      ( 30 Dec 2023 06:20 )             22.8     12-30    136  |  106  |  30<H>  ----------------------------<  65<L>  4.1   |  17<L>  |  1.78<H>    Ca    7.6<L>      30 Dec 2023 06:20  Phos  2.4     12-30  Mg     2.00     12-30    TPro  6.1  /  Alb  2.3<L>  /  TBili  0.8  /  DBili  x   /  AST  29  /  ALT  13  /  AlkPhos  64  12-30    RADIOLOGY & ADDITIONAL STUDIES:  rad< from: NM Hepatobiliary Imaging w/ RX (12.29.23 @ 12:56) >  IMPRESSION: Abnormal morphine-augmented hepatobiliary scan.    Findings are compatible with acute cholecystitis.    No significant change as compared to previous study performed on   12/28/2023.    Findings were discussed with DADA Munoz at 1 pm on 12/29/2023.    --- End of Report ---    NOLAN REDMOND MD; Attending Nuclear Medicine  This document has been electronically signed. Dec 29 2023  1:59PM    < end of copied text >    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) who presents to the hospital for COVID19 infection and chest pain  Abd distention   MABLE   Hypophosphatemia      1 Renal - Creatinine stable, now off IVF   S/p CT with contrast   S/p Naphos 15 mmol IV x 1 this morning   2 CV - BP improved/controlled, on lopressor 25 mg po bid, If possible start Norvasc if needed   3 Vasc - s/p SMA stent placement;   4 Sx - now on regular diet, s/p HIDA + for acute asa, medical management     Faby Cummins DNP  [facility]  [phone]    NEPHROLOGY     Patient seen and examined with family at bedside, resting comfortably on room air, in no acute distress.     MEDICATIONS  (STANDING):  acetaminophen     Tablet .. 975 milliGRAM(s) Oral every 6 hours  aspirin  chewable 81 milliGRAM(s) Oral daily  atorvastatin 80 milliGRAM(s) Oral at bedtime  chlorhexidine 2% Cloths 1 Application(s) Topical daily  dextrose 50% Injectable 25 Gram(s) IV Push once  dextrose 50% Injectable 12.5 Gram(s) IV Push once  escitalopram 10 milliGRAM(s) Oral daily  heparin   Injectable 5000 Unit(s) SubCutaneous every 8 hours  insulin glargine Injectable (LANTUS) 20 Unit(s) SubCutaneous at bedtime  insulin lispro (ADMELOG) corrective regimen sliding scale   SubCutaneous every 6 hours  levETIRAcetam 250 milliGRAM(s) Oral two times a day  melatonin 6 milliGRAM(s) Oral at bedtime  memantine 5 milliGRAM(s) Oral two times a day  metoprolol tartrate 25 milliGRAM(s) Oral two times a day  piperacillin/tazobactam IVPB.. 3.375 Gram(s) IV Intermittent every 12 hours  ranolazine 500 milliGRAM(s) Oral two times a day  ticagrelor 60 milliGRAM(s) Oral every 12 hours    VITALS:  T(C): , Max: 36.6 (12-29-23 @ 13:44)  T(F): , Max: 97.9 (12-29-23 @ 13:44)  HR: 71 (12-30-23 @ 10:04)  BP: 118/47 (12-30-23 @ 10:04)  BP(mean): 69 (12-30-23 @ 10:04)  RR: 15 (12-30-23 @ 10:04)  SpO2: 97% (12-30-23 @ 10:04)    I and O's:    12-29 @ 07:01  -  12-30 @ 07:00  --------------------------------------------------------  IN: 430 mL / OUT: 750 mL / NET: -320 mL    PHYSICAL EXAM:  Constitutional: NAD.  HEENT: EOMI  Neck:  No JVD, supple   Respiratory: CTA B/L  Cardiovascular: S1 and S2, RRR  Gastrointestinal: + BS   Extremities: No peripheral edema, + peripheral pulses  Neurological:   Psychiatric: Normal mood, normal affect  : no Moss  Skin: No rashes, C/D/I    LABS:                        8.0    16.31 )-----------( 326      ( 30 Dec 2023 06:20 )             22.8     12-30    136  |  106  |  30<H>  ----------------------------<  65<L>  4.1   |  17<L>  |  1.78<H>    Ca    7.6<L>      30 Dec 2023 06:20  Phos  2.4     12-30  Mg     2.00     12-30    TPro  6.1  /  Alb  2.3<L>  /  TBili  0.8  /  DBili  x   /  AST  29  /  ALT  13  /  AlkPhos  64  12-30    RADIOLOGY & ADDITIONAL STUDIES:  rad< from: NM Hepatobiliary Imaging w/ RX (12.29.23 @ 12:56) >  IMPRESSION: Abnormal morphine-augmented hepatobiliary scan.    Findings are compatible with acute cholecystitis.    No significant change as compared to previous study performed on   12/28/2023.    Findings were discussed with DADA Munoz at 1 pm on 12/29/2023.    --- End of Report ---    NOLAN REDMOND MD; Attending Nuclear Medicine  This document has been electronically signed. Dec 29 2023  1:59PM    < end of copied text >    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) who presents to the hospital for COVID19 infection and chest pain  Abd distention   MABLE   Hypophosphatemia      1 Renal - Creatinine stable, now off IVF   S/p CT with contrast   S/p Naphos 15 mmol IV x 1 this morning   2 CV - BP improved/controlled, on lopressor 25 mg po bid, If possible start Norvasc if needed   3 Vasc - s/p SMA stent placement;   4 Sx - now on regular diet, s/p HIDA + for acute asa, medical management     Faby Cummins DNP  [facility]  [phone]

## 2023-12-30 NOTE — PROGRESS NOTE ADULT - ASSESSMENT
PLAN:     NEUROLOGY:  - A&O x0 at baseline  - home dose phenobarb  - pain control tylenol     RESPIRATORY:  - Maintain O2 saturation >92%  - Extubated on 12/28  - nebulizer q6h    CARDIOVASCULAR:  - MAP >65    GI / NUTRITION: hx PEG  - Diet: NPO  - Protonix ppx   - TF through PEG tube when bowel function resumes  - MELANI on 12/30 if no bowel function    RENAL / GENITOURINARY:  - LR @100  - DC Moss, f/u TOV  - Monitor electrolytes and replete PRN  - Continue to monitor strict ins and outs q1 hour    HEMATOLOGIC:  - monitor H/H  - DVT ppx: Lovenox     INFECTIOUS DISEASE: E.coli bacteremia on admission  - CTX for Ecoli bacteremia and UTI  - Urine culture- E.Coli   - repeat blood cultures ordered for AM  - flu +, continue tamiflu     ENDOCRINOLOGY:  - monitor glucose on BMP    Disposition: SICU     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 Surgery consulted on 12/24 for abdominal pain and distension. CTA AP obtained which showed  partially occlusive calcified and non-calcified plaque just distal to take-off of the SMA, with estimated at least 75% luminal narrowing. Limited evaluation of its distal branches. Patient taken emergently to OR on 12/24 with general surgery and vascular surgery. Patient s/p diagnostic laparoscopy and SMA stent placement with brachial cutdown. SICU consulted postoperatively for HD monitoring, now listed for floor.    PLAN:     NEUROLOGY:  - A&O x0 at baseline  - home dose phenobarb  - pain control tylenol     RESPIRATORY:  - Maintain O2 saturation >92%  - Extubated on 12/28  - nebulizer q6h    CARDIOVASCULAR:  - MAP >65    GI / NUTRITION: hx PEG  - Diet: NPO  - Protonix ppx   - TF through PEG tube when bowel function resumes  - MELANI on 12/30 if no bowel function    RENAL / GENITOURINARY:  - LR @100  - DC Moss, f/u TOV  - Monitor electrolytes and replete PRN  - Continue to monitor strict ins and outs q1 hour    HEMATOLOGIC:  - monitor H/H  - DVT ppx: Lovenox     INFECTIOUS DISEASE:   - Zosyn    ENDOCRINOLOGY:  - monitor glucose on BMP    Disposition: SICU     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 Surgery consulted on 12/24 for abdominal pain and distension. CTA AP obtained which showed  partially occlusive calcified and non-calcified plaque just distal to take-off of the SMA, with estimated at least 75% luminal narrowing. Limited evaluation of its distal branches. Patient taken emergently to OR on 12/24 with general surgery and vascular surgery. Patient s/p diagnostic laparoscopy and SMA stent placement with brachial cutdown. SICU consulted postoperatively for HD monitoring, now listed for floor.      PLAN:   Neurologic:  -A&Ox2 (baseline per family)   -Continue Keppra- hx of myoclonic jerks   -Pain: Tylenol  -ASA 81 for hx CVA, stents  -melatonin 6 qhs  -lexapro 10  -memantine 5 bid    Respiratory:  -Maintain O2 saturation >92%  -COVID + (12/20)    Cardiovascular:  -MAP goal >65  -Metoprolol 25 bid and home ranolazine   -ASA 81 qd  -Lipitor 80    Gastrointestinal/Nutrition:  -Diet: CLD  -F/u RUQ US 12/26-> GB distended with cholelithiasis, equivocal   -Trial abx for cholecystitis, no plan for percutaneous cholecystostomy at this time  -HIDA technically positive, however patient has been NPO for several days and is non-tender      Genitourinary/Renal:  - Hx of CKD (Baseline Cr 1.2-1.3)   - Monitor strict I/Os     Hematologic:  -DVT ppx: SQH   -ASA 81 PO for hx stents, CVA x3  -Brilinta qd    Infectious Disease:  -Abx: Zosyn through 1/1, s/p remdesivir   -Monitor WBC   -Monitor fever curve     Endocrine:  -T2DM   -ISS, Lantus 20U   -Monitor blood glucose     Disposition: Listed   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 Surgery consulted on 12/24 for abdominal pain and distension. CTA AP obtained which showed  partially occlusive calcified and non-calcified plaque just distal to take-off of the SMA, with estimated at least 75% luminal narrowing. Limited evaluation of its distal branches. Patient taken emergently to OR on 12/24 with general surgery and vascular surgery. Patient s/p diagnostic laparoscopy and SMA stent placement with brachial cutdown. SICU consulted postoperatively for HD monitoring, now listed for floor.      PLAN:   Neurologic:  -A&Ox2 (baseline per family)   -Continue Keppra- hx of myoclonic jerks   -Pain: Tylenol  -ASA 81 for hx CVA, stents  -melatonin 6 qhs  -lexapro 10  -memantine 5 bid    Respiratory:  -Maintain O2 saturation >92%  -COVID + (12/20)    Cardiovascular:  -MAP goal >65  -Metoprolol 25 bid and home ranolazine   -ASA 81 qd  -Lipitor 80    Gastrointestinal/Nutrition:  -Diet: CLD  -F/u RUQ US 12/26-> GB distended with cholelithiasis, equivocal   -Trial abx for cholecystitis, no plan for percutaneous cholecystostomy at this time  -HIDA technically positive, however patient has been NPO for several days and is non-tender      Genitourinary/Renal:  - Hx of CKD (Baseline Cr 1.2-1.3)   - Monitor strict I/Os     Hematologic:  -DVT ppx: SQH   -ASA 81 PO for hx stents, CVA x3  -Brilinta qd    Infectious Disease:  -Abx: Zosyn through 1/1, s/p remdesivir   -Monitor WBC   -Monitor fever curve     Endocrine:  -T2DM   -ISS, Lantus 12U   -Monitor blood glucose     Disposition: Listed

## 2023-12-30 NOTE — PROGRESS NOTE ADULT - ATTENDING COMMENTS
I agree with the history, physical, and plan, which I have reviewed and edited where appropriate.  I agree with notes/assessment of health care providers on my service.  I have personally examined the patient.  I was physically present for the key portions of the evaluation and management (E/M) service provided.  I reviewed data and laboratory tests/x-rays and all pertinent electronic images.  The patient is a critical care patient with life threatening hemodynamic and metabolic instability in SICU.  Risk benefit analyses discussed.    The patient is in SICU with diagnosis mentioned in the note.    The plan is specified below.    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 CTA AP demonstrated SMA stenosis. Limited evaluation of its distal branches. Patient taken emergently to OR on 12/24 with general surgery and vascular surgery. Patient s/p diagnostic laparoscopy and SMA stent placement with brachial cutdown. SICU consulted postoperatively for HD monitoring, now listed for floor. Acute cholecystitis being treated with antibiotics.     PLAN:     NEUROLOGY: post-operative pain   - pain control tylenol   - escitalopram  - Keppra   - memantidine     RESPIRATORY:  - nebulizer q6h    CARDIOVASCULAR:  - MAP >65  - Atorvastatin   - metoprolol 25 bid     GI / NUTRITION:  acute cholecystitis   - Diet: Regular diet     RENAL / GENITOURINARY:  - IV lock   - voiding    HEMATOLOGIC: SMA stent   - monitor H/H  - DVT ppx: heparin sq  - ticagrelor and asa    INFECTIOUS DISEASE: Acute cholecystitis  - Zosyn x 7 d     ENDOCRINOLOGY: DM, hypoglycemia   - decrease lantus from 20 to 12

## 2023-12-30 NOTE — PROGRESS NOTE ADULT - SUBJECTIVE AND OBJECTIVE BOX
Aashish Boyer MD  Interventional Cardiology / Advance Heart Failure and Cardiac Transplant Specialist  [place] Office : [address] [place] [address] [place]. [address]  Tel:   [place] Office : [address] [facility] [place] [address]  Tel: [phone]       Pt is lying in bed comfortable not in distress   	  MEDICATIONS:  aspirin  chewable 81 milliGRAM(s) Oral daily  heparin   Injectable 5000 Unit(s) SubCutaneous every 8 hours  metoprolol tartrate 25 milliGRAM(s) Oral two times a day  ranolazine 500 milliGRAM(s) Oral two times a day  ticagrelor 60 milliGRAM(s) Oral every 12 hours    piperacillin/tazobactam IVPB.. 3.375 Gram(s) IV Intermittent every 12 hours      acetaminophen     Tablet .. 975 milliGRAM(s) Oral every 6 hours  escitalopram 10 milliGRAM(s) Oral daily  levETIRAcetam 250 milliGRAM(s) Oral two times a day  melatonin 6 milliGRAM(s) Oral at bedtime  memantine 5 milliGRAM(s) Oral two times a day      atorvastatin 80 milliGRAM(s) Oral at bedtime  dextrose 50% Injectable 25 Gram(s) IV Push once  dextrose 50% Injectable 12.5 Gram(s) IV Push once  insulin glargine Injectable (LANTUS) 20 Unit(s) SubCutaneous at bedtime  insulin lispro (ADMELOG) corrective regimen sliding scale   SubCutaneous every 6 hours    chlorhexidine 2% Cloths 1 Application(s) Topical daily      PAST MEDICAL/SURGICAL HISTORY  PAST MEDICAL & SURGICAL HISTORY:  Hyperlipemia      Hypertension      Coronary Artery Disease      Diabetes Mellitus Type II      Stented Coronary Artery  total 5 stents, last stent 5/2019      Neuropathy      Myocardial infarction      Stroke  mild left facial numbness   no other residuals verbalized      Myoclonic jerking      Stage 3 chronic kidney disease      History of Cataract Extraction      Hx of CABG      H/O coronary angiogram      S/P coronary artery stent placement  1/6/09      S/P placement of cardiac pacemaker          SOCIAL HISTORY: Substance Use (street drugs): ( x ) never used  (  ) other:    FAMILY HISTORY:  No pertinent family history in first degree relatives         PHYSICAL EXAM:  T(C): 36.3 (12-30-23 @ 08:00), Max: 36.6 (12-29-23 @ 13:44)  HR: 74 (12-30-23 @ 08:00) (71 - 79)  BP: 113/50 (12-30-23 @ 08:00) (110/50 - 176/72)  RR: 22 (12-30-23 @ 08:00) (12 - 24)  SpO2: 98% (12-30-23 @ 08:00) (98% - 100%)  Wt(kg): --  I&O's Summary    29 Dec 2023 07:01  -  30 Dec 2023 07:00  --------------------------------------------------------  IN: 430 mL / OUT: 750 mL / NET: -320 mL    30 Dec 2023 07:01  -  30 Dec 2023 10:10  --------------------------------------------------------  IN: 150 mL / OUT: 0 mL / NET: 150 mL             EYES:   PERRLA   ENMT:   Moist mucous membranes, Good dentition, No lesions  Cardiovascular: Normal S1 S2, No JVD, No murmurs, No edema  Respiratory: Lungs clear to auscultation	  Gastrointestinal:  s/p surgery   Extremities: no edema                                    8.0    16.31 )-----------( 326      ( 30 Dec 2023 06:20 )             22.8     12-30    136  |  106  |  30<H>  ----------------------------<  65<L>  4.1   |  17<L>  |  1.78<H>    Ca    7.6<L>      30 Dec 2023 06:20  Phos  2.4     12-30  Mg     2.00     12-30    TPro  6.1  /  Alb  2.3<L>  /  TBili  0.8  /  DBili  x   /  AST  29  /  ALT  13  /  AlkPhos  64  12-30    proBNP:   Lipid Profile:   HgA1c:   TSH:     Consultant(s) Notes Reviewed:  [x ] YES  [ ] NO    Care Discussed with Consultants/Other Providers [ x] YES  [ ] NO    Imaging Personally Reviewed independently:  [x] YES  [ ] NO    All labs, radiologic studies, vitals, orders and medications list reviewed. Patient is seen and examined at bedside. Case discussed with medical team.         Aashish Boyer MD  Interventional Cardiology / Advance Heart Failure and Cardiac Transplant Specialist  [place] Office : [address] [place] [address] [place]. [address]  Tel:   [place] Office : [address] [facility] [place] [address]  Tel: [phone]       Pt is lying in bed comfortable not in distress   	  MEDICATIONS:  aspirin  chewable 81 milliGRAM(s) Oral daily  heparin   Injectable 5000 Unit(s) SubCutaneous every 8 hours  metoprolol tartrate 25 milliGRAM(s) Oral two times a day  ranolazine 500 milliGRAM(s) Oral two times a day  ticagrelor 60 milliGRAM(s) Oral every 12 hours    piperacillin/tazobactam IVPB.. 3.375 Gram(s) IV Intermittent every 12 hours      acetaminophen     Tablet .. 975 milliGRAM(s) Oral every 6 hours  escitalopram 10 milliGRAM(s) Oral daily  levETIRAcetam 250 milliGRAM(s) Oral two times a day  melatonin 6 milliGRAM(s) Oral at bedtime  memantine 5 milliGRAM(s) Oral two times a day      atorvastatin 80 milliGRAM(s) Oral at bedtime  dextrose 50% Injectable 25 Gram(s) IV Push once  dextrose 50% Injectable 12.5 Gram(s) IV Push once  insulin glargine Injectable (LANTUS) 20 Unit(s) SubCutaneous at bedtime  insulin lispro (ADMELOG) corrective regimen sliding scale   SubCutaneous every 6 hours    chlorhexidine 2% Cloths 1 Application(s) Topical daily      PAST MEDICAL/SURGICAL HISTORY  PAST MEDICAL & SURGICAL HISTORY:  Hyperlipemia      Hypertension      Coronary Artery Disease      Diabetes Mellitus Type II      Stented Coronary Artery  total 5 stents, last stent 5/2019      Neuropathy      Myocardial infarction      Stroke  mild left facial numbness   no other residuals verbalized      Myoclonic jerking      Stage 3 chronic kidney disease      History of Cataract Extraction      Hx of CABG      H/O coronary angiogram      S/P coronary artery stent placement  1/6/09      S/P placement of cardiac pacemaker          SOCIAL HISTORY: Substance Use (street drugs): ( x ) never used  (  ) other:    FAMILY HISTORY:  No pertinent family history in first degree relatives         PHYSICAL EXAM:  T(C): 36.3 (12-30-23 @ 08:00), Max: 36.6 (12-29-23 @ 13:44)  HR: 74 (12-30-23 @ 08:00) (71 - 79)  BP: 113/50 (12-30-23 @ 08:00) (110/50 - 176/72)  RR: 22 (12-30-23 @ 08:00) (12 - 24)  SpO2: 98% (12-30-23 @ 08:00) (98% - 100%)  Wt(kg): --  I&O's Summary    29 Dec 2023 07:01  -  30 Dec 2023 07:00  --------------------------------------------------------  IN: 430 mL / OUT: 750 mL / NET: -320 mL    30 Dec 2023 07:01  -  30 Dec 2023 10:10  --------------------------------------------------------  IN: 150 mL / OUT: 0 mL / NET: 150 mL             EYES:   PERRLA   ENMT:   Moist mucous membranes, Good dentition, No lesions  Cardiovascular: Normal S1 S2, No JVD, No murmurs, No edema  Respiratory: Lungs clear to auscultation	  Gastrointestinal:  s/p surgery   Extremities: no edema                                    8.0    16.31 )-----------( 326      ( 30 Dec 2023 06:20 )             22.8     12-30    136  |  106  |  30<H>  ----------------------------<  65<L>  4.1   |  17<L>  |  1.78<H>    Ca    7.6<L>      30 Dec 2023 06:20  Phos  2.4     12-30  Mg     2.00     12-30    TPro  6.1  /  Alb  2.3<L>  /  TBili  0.8  /  DBili  x   /  AST  29  /  ALT  13  /  AlkPhos  64  12-30    proBNP:   Lipid Profile:   HgA1c:   TSH:     Consultant(s) Notes Reviewed:  [x ] YES  [ ] NO    Care Discussed with Consultants/Other Providers [ x] YES  [ ] NO    Imaging Personally Reviewed independently:  [x] YES  [ ] NO    All labs, radiologic studies, vitals, orders and medications list reviewed. Patient is seen and examined at bedside. Case discussed with medical team.

## 2023-12-30 NOTE — PROGRESS NOTE ADULT - SUBJECTIVE AND OBJECTIVE BOX
SUBJECTIVE: Patient seen and examined. Denies any abdominal pain. States he is tired.     Vital Signs Last 24 Hrs  T(C): 36.3 (30 Dec 2023 08:00), Max: 36.6 (29 Dec 2023 13:44)  T(F): 97.3 (30 Dec 2023 08:00), Max: 97.9 (29 Dec 2023 13:44)  HR: 74 (30 Dec 2023 08:00) (71 - 79)  BP: 113/50 (30 Dec 2023 08:00) (110/50 - 176/72)  BP(mean): 69 (30 Dec 2023 08:00) (69 - 101)  RR: 22 (30 Dec 2023 08:00) (12 - 24)  SpO2: 98% (30 Dec 2023 08:00) (98% - 100%)    Parameters below as of 30 Dec 2023 08:00  Patient On (Oxygen Delivery Method): nasal cannula  O2 Flow (L/min): 2      I&O's Detail    29 Dec 2023 07:01  -  30 Dec 2023 07:00  --------------------------------------------------------  IN:    IV PiggyBack: 100 mL    Oral Fluid: 330 mL  Total IN: 430 mL    OUT:    Stool (mL): 0 mL    Voided (mL): 750 mL  Total OUT: 750 mL    Total NET: -320 mL      30 Dec 2023 07:01  -  30 Dec 2023 09:22  --------------------------------------------------------  IN:    Oral Fluid: 150 mL  Total IN: 150 mL    OUT:  Total OUT: 0 mL    Total NET: 150 mL          Physical Exam:  Gen: NAD  Resp: Non-labored breathing on RA. No wheezes / rhonchi  Cardiac: S1S2, reg rate  GI: Softly distended, nontender on exam  Ext: Warm, well perfused    LABS:                        8.0    16.31 )-----------( 326      ( 30 Dec 2023 06:20 )             22.8     12-30    136  |  106  |  30<H>  ----------------------------<  65<L>  4.1   |  17<L>  |  1.78<H>    Ca    7.6<L>      30 Dec 2023 06:20  Phos  2.4     12-30  Mg     2.00     12-30    TPro  6.1  /  Alb  2.3<L>  /  TBili  0.8  /  DBili  x   /  AST  29  /  ALT  13  /  AlkPhos  64  12-30    PT/INR - ( 29 Dec 2023 06:28 )   PT: 13.8 sec;   INR: 1.23 ratio         PTT - ( 29 Dec 2023 06:28 )  PTT:34.1 sec  Urinalysis Basic - ( 30 Dec 2023 06:20 )    Color: x / Appearance: x / SG: x / pH: x  Gluc: 65 mg/dL / Ketone: x  / Bili: x / Urobili: x   Blood: x / Protein: x / Nitrite: x   Leuk Esterase: x / RBC: x / WBC x   Sq Epi: x / Non Sq Epi: x / Bacteria: x        RADIOLOGY & ADDITIONAL STUDIES:

## 2023-12-30 NOTE — PROGRESS NOTE ADULT - ASSESSMENT
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 CTA demonstrating mesenteric fat stranding associated with ascending/transverse colon. S/p SMA angiography with stent placement with diagnostic laparoscopy 12/24, small bowel and visible colon viable, some inflammation of omentum in RUQ.     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 CTA demonstrating mesenteric fat stranding associated with ascending/transverse colon. S/p SMA angiography with stent placement with diagnostic laparoscopy 12/24, small bowel and visible colon viable, some inflammation of omentum in RUQ.     Plan:   - Pain control PRN  - HIDA +, treating cholecystitis with medical management due to poor surgical candidate for lap asa   - reg diet  - awaiting transfer to floor  - Appreciate excellent care per SICU     C Team Surgery   [id number]

## 2023-12-30 NOTE — PROGRESS NOTE ADULT - ASSESSMENT
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     EKG SR RBBB (old per family     1) CAD s/p CABG  -p/w chest pain. EKG non ischemic , trop -sera   - echo with normal lv and moderate AS   - c/w metoprolol      2) Covid +  - s/p remdesivir   - c/w supportive management   - t/t per primary team     3) Abd distension   -CTA AP obtained which showed  partially occlusive calcified and non-calcified plaque just distal to take-off of the SMA, with estimated at least 75% luminal narrowing. Limited evaluation of its distal branches. Patient taken emergently to OR on 12/24 s/p diagnostic laparoscopy and SMA stent placement with brachial cutdown  -Surgery/vascular on board , f/u recs  - HIDA scan + for acute asa, medical management as poor surgical candidate cont zosyn

## 2023-12-30 NOTE — PROGRESS NOTE ADULT - SUBJECTIVE AND OBJECTIVE BOX
SICU Daily Progress Note  =====================================================  Interval/Overnight Events:         - HIDA technically positive, however patient has been NPO for several days and is non-tender. Will continue abx.  - melatonin started   - Off Covid restriction (12/30)    Allergies: fluoroquinolone antibiotics (Other)  Cipro (Unknown)  Tegretol (Unknown)  carbamazepine (Other)      MEDICATIONS:   --------------------------------------------------------------------------------------  Neurologic Medications  acetaminophen     Tablet .. 975 milliGRAM(s) Oral every 6 hours  escitalopram 10 milliGRAM(s) Oral daily  levETIRAcetam 250 milliGRAM(s) Oral two times a day  melatonin 6 milliGRAM(s) Oral at bedtime  memantine 5 milliGRAM(s) Oral two times a day    Respiratory Medications    Cardiovascular Medications  metoprolol tartrate 25 milliGRAM(s) Oral two times a day  ranolazine 500 milliGRAM(s) Oral two times a day    Gastrointestinal Medications    Genitourinary Medications    Hematologic/Oncologic Medications  aspirin  chewable 81 milliGRAM(s) Oral daily  heparin   Injectable 5000 Unit(s) SubCutaneous every 8 hours  ticagrelor 60 milliGRAM(s) Oral every 12 hours    Antimicrobial/Immunologic Medications  piperacillin/tazobactam IVPB.. 3.375 Gram(s) IV Intermittent every 12 hours    Endocrine/Metabolic Medications  atorvastatin 80 milliGRAM(s) Oral at bedtime  dextrose 50% Injectable 25 Gram(s) IV Push once  dextrose 50% Injectable 12.5 Gram(s) IV Push once  insulin glargine Injectable (LANTUS) 20 Unit(s) SubCutaneous at bedtime  insulin lispro (ADMELOG) corrective regimen sliding scale   SubCutaneous every 6 hours    Topical/Other Medications  chlorhexidine 2% Cloths 1 Application(s) Topical daily    --------------------------------------------------------------------------------------    VITAL SIGNS, INS/OUTS (last 24 hours):  --------------------------------------------------------------------------------------  ICU Vital Signs Last 24 Hrs  T(C): 36.2 (29 Dec 2023 20:00), Max: 37.2 (29 Dec 2023 04:00)  T(F): 97.2 (29 Dec 2023 20:00), Max: 98.9 (29 Dec 2023 04:00)  HR: 71 (29 Dec 2023 20:00) (71 - 80)  BP: 110/50 (29 Dec 2023 20:00) (110/50 - 176/72)  BP(mean): 69 (29 Dec 2023 20:00) (69 - 101)  ABP: --  ABP(mean): --  RR: 13 (29 Dec 2023 20:00) (12 - 24)  SpO2: 99% (29 Dec 2023 20:00) (98% - 100%)    O2 Parameters below as of 29 Dec 2023 20:00  Patient On (Oxygen Delivery Method): nasal cannula  O2 Flow (L/min): 2    I&O's Summary    28 Dec 2023 07:01  -  29 Dec 2023 07:00  --------------------------------------------------------  IN: 150 mL / OUT: 1000 mL / NET: -850 mL    29 Dec 2023 07:01  -  30 Dec 2023 00:04  --------------------------------------------------------  IN: 430 mL / OUT: 350 mL / NET: 80 mL      --------------------------------------------------------------------------------------    EXAM    NEUROLOGY  Exam: Normal, in no acute distress.  Alert and oriented x2.     RESPIRATORY  Exam: Normal expansion/effort.     CARDIOVASCULAR  Exam: Regular rate and rhythm.         LABS  --------------------------------------------------------------------------------------  12-29    138  |  106  |  32<H>  ----------------------------<  95  3.8   |  22  |  1.71<H>    Ca    7.6<L>      29 Dec 2023 06:28  Phos  2.4     12-29  Mg     2.10     12-29    TPro  5.9<L>  /  Alb  2.6<L>  /  TBili  0.8  /  DBili  x   /  AST  27  /  ALT  13  /  AlkPhos  58  12-29    CBC Full  -  ( 29 Dec 2023 06:28 )  WBC Count : 14.07 K/uL  RBC Count : 2.72 M/uL  Hemoglobin : 8.2 g/dL  Hematocrit : 23.9 %  Platelet Count - Automated : 328 K/uL  Mean Cell Volume : 87.9 fL  Mean Cell Hemoglobin : 30.1 pg  Mean Cell Hemoglobin Concentration : 34.3 gm/dL  Auto Neutrophil # : x  Auto Lymphocyte # : x  Auto Monocyte # : x  Auto Eosinophil # : x  Auto Basophil # : x  Auto Neutrophil % : x  Auto Lymphocyte % : x  Auto Monocyte % : x  Auto Eosinophil % : x  Auto Basophil % : x    --------------------------------------------------------------------------------------    IMAGING STUDIES:

## 2023-12-31 LAB
ANION GAP SERPL CALC-SCNC: 10 MMOL/L — SIGNIFICANT CHANGE UP (ref 7–14)
ANION GAP SERPL CALC-SCNC: 10 MMOL/L — SIGNIFICANT CHANGE UP (ref 7–14)
ANION GAP SERPL CALC-SCNC: 12 MMOL/L — SIGNIFICANT CHANGE UP (ref 7–14)
ANION GAP SERPL CALC-SCNC: 12 MMOL/L — SIGNIFICANT CHANGE UP (ref 7–14)
BUN SERPL-MCNC: 25 MG/DL — HIGH (ref 7–23)
BUN SERPL-MCNC: 25 MG/DL — HIGH (ref 7–23)
BUN SERPL-MCNC: 27 MG/DL — HIGH (ref 7–23)
BUN SERPL-MCNC: 27 MG/DL — HIGH (ref 7–23)
CALCIUM SERPL-MCNC: 7.5 MG/DL — LOW (ref 8.4–10.5)
CHLORIDE SERPL-SCNC: 101 MMOL/L — SIGNIFICANT CHANGE UP (ref 98–107)
CHLORIDE SERPL-SCNC: 101 MMOL/L — SIGNIFICANT CHANGE UP (ref 98–107)
CHLORIDE SERPL-SCNC: 104 MMOL/L — SIGNIFICANT CHANGE UP (ref 98–107)
CHLORIDE SERPL-SCNC: 104 MMOL/L — SIGNIFICANT CHANGE UP (ref 98–107)
CK MB BLD-MCNC: 3.5 % — HIGH (ref 0–2.5)
CK MB BLD-MCNC: 3.5 % — HIGH (ref 0–2.5)
CK MB BLD-MCNC: 3.6 % — HIGH (ref 0–2.5)
CK MB BLD-MCNC: 3.6 % — HIGH (ref 0–2.5)
CK MB CFR SERPL CALC: 2.8 NG/ML — SIGNIFICANT CHANGE UP
CK MB CFR SERPL CALC: 2.8 NG/ML — SIGNIFICANT CHANGE UP
CK MB CFR SERPL CALC: 3.2 NG/ML — SIGNIFICANT CHANGE UP
CK MB CFR SERPL CALC: 3.2 NG/ML — SIGNIFICANT CHANGE UP
CK SERPL-CCNC: 81 U/L — SIGNIFICANT CHANGE UP (ref 30–200)
CK SERPL-CCNC: 81 U/L — SIGNIFICANT CHANGE UP (ref 30–200)
CK SERPL-CCNC: 90 U/L — SIGNIFICANT CHANGE UP (ref 30–200)
CK SERPL-CCNC: 90 U/L — SIGNIFICANT CHANGE UP (ref 30–200)
CO2 SERPL-SCNC: 21 MMOL/L — LOW (ref 22–31)
CO2 SERPL-SCNC: 21 MMOL/L — LOW (ref 22–31)
CO2 SERPL-SCNC: 22 MMOL/L — SIGNIFICANT CHANGE UP (ref 22–31)
CO2 SERPL-SCNC: 22 MMOL/L — SIGNIFICANT CHANGE UP (ref 22–31)
CREAT SERPL-MCNC: 1.74 MG/DL — HIGH (ref 0.5–1.3)
CREAT SERPL-MCNC: 1.74 MG/DL — HIGH (ref 0.5–1.3)
CREAT SERPL-MCNC: 1.79 MG/DL — HIGH (ref 0.5–1.3)
CREAT SERPL-MCNC: 1.79 MG/DL — HIGH (ref 0.5–1.3)
EGFR: 36 ML/MIN/1.73M2 — LOW
EGFR: 36 ML/MIN/1.73M2 — LOW
EGFR: 37 ML/MIN/1.73M2 — LOW
EGFR: 37 ML/MIN/1.73M2 — LOW
GLUCOSE SERPL-MCNC: 203 MG/DL — HIGH (ref 70–99)
GLUCOSE SERPL-MCNC: 203 MG/DL — HIGH (ref 70–99)
GLUCOSE SERPL-MCNC: 204 MG/DL — HIGH (ref 70–99)
GLUCOSE SERPL-MCNC: 204 MG/DL — HIGH (ref 70–99)
HCT VFR BLD CALC: 22.7 % — LOW (ref 39–50)
HCT VFR BLD CALC: 22.7 % — LOW (ref 39–50)
HCT VFR BLD CALC: 23 % — LOW (ref 39–50)
HCT VFR BLD CALC: 23 % — LOW (ref 39–50)
HGB BLD-MCNC: 7.6 G/DL — LOW (ref 13–17)
HGB BLD-MCNC: 7.6 G/DL — LOW (ref 13–17)
HGB BLD-MCNC: 7.8 G/DL — LOW (ref 13–17)
HGB BLD-MCNC: 7.8 G/DL — LOW (ref 13–17)
MAGNESIUM SERPL-MCNC: 1.9 MG/DL — SIGNIFICANT CHANGE UP (ref 1.6–2.6)
MAGNESIUM SERPL-MCNC: 1.9 MG/DL — SIGNIFICANT CHANGE UP (ref 1.6–2.6)
MAGNESIUM SERPL-MCNC: 2 MG/DL — SIGNIFICANT CHANGE UP (ref 1.6–2.6)
MAGNESIUM SERPL-MCNC: 2 MG/DL — SIGNIFICANT CHANGE UP (ref 1.6–2.6)
MCHC RBC-ENTMCNC: 30 PG — SIGNIFICANT CHANGE UP (ref 27–34)
MCHC RBC-ENTMCNC: 30 PG — SIGNIFICANT CHANGE UP (ref 27–34)
MCHC RBC-ENTMCNC: 30.4 PG — SIGNIFICANT CHANGE UP (ref 27–34)
MCHC RBC-ENTMCNC: 30.4 PG — SIGNIFICANT CHANGE UP (ref 27–34)
MCHC RBC-ENTMCNC: 33.5 GM/DL — SIGNIFICANT CHANGE UP (ref 32–36)
MCHC RBC-ENTMCNC: 33.5 GM/DL — SIGNIFICANT CHANGE UP (ref 32–36)
MCHC RBC-ENTMCNC: 33.9 GM/DL — SIGNIFICANT CHANGE UP (ref 32–36)
MCHC RBC-ENTMCNC: 33.9 GM/DL — SIGNIFICANT CHANGE UP (ref 32–36)
MCV RBC AUTO: 89.5 FL — SIGNIFICANT CHANGE UP (ref 80–100)
MCV RBC AUTO: 89.5 FL — SIGNIFICANT CHANGE UP (ref 80–100)
MCV RBC AUTO: 89.7 FL — SIGNIFICANT CHANGE UP (ref 80–100)
MCV RBC AUTO: 89.7 FL — SIGNIFICANT CHANGE UP (ref 80–100)
NRBC # BLD: 0 /100 WBCS — SIGNIFICANT CHANGE UP (ref 0–0)
NRBC # FLD: 0 K/UL — SIGNIFICANT CHANGE UP (ref 0–0)
PHOSPHATE SERPL-MCNC: 2.2 MG/DL — LOW (ref 2.5–4.5)
PHOSPHATE SERPL-MCNC: 2.2 MG/DL — LOW (ref 2.5–4.5)
PHOSPHATE SERPL-MCNC: 2.7 MG/DL — SIGNIFICANT CHANGE UP (ref 2.5–4.5)
PHOSPHATE SERPL-MCNC: 2.7 MG/DL — SIGNIFICANT CHANGE UP (ref 2.5–4.5)
PLATELET # BLD AUTO: 345 K/UL — SIGNIFICANT CHANGE UP (ref 150–400)
PLATELET # BLD AUTO: 345 K/UL — SIGNIFICANT CHANGE UP (ref 150–400)
PLATELET # BLD AUTO: 359 K/UL — SIGNIFICANT CHANGE UP (ref 150–400)
PLATELET # BLD AUTO: 359 K/UL — SIGNIFICANT CHANGE UP (ref 150–400)
POTASSIUM SERPL-MCNC: 3.6 MMOL/L — SIGNIFICANT CHANGE UP (ref 3.5–5.3)
POTASSIUM SERPL-MCNC: 3.6 MMOL/L — SIGNIFICANT CHANGE UP (ref 3.5–5.3)
POTASSIUM SERPL-MCNC: 3.9 MMOL/L — SIGNIFICANT CHANGE UP (ref 3.5–5.3)
POTASSIUM SERPL-MCNC: 3.9 MMOL/L — SIGNIFICANT CHANGE UP (ref 3.5–5.3)
POTASSIUM SERPL-SCNC: 3.6 MMOL/L — SIGNIFICANT CHANGE UP (ref 3.5–5.3)
POTASSIUM SERPL-SCNC: 3.6 MMOL/L — SIGNIFICANT CHANGE UP (ref 3.5–5.3)
POTASSIUM SERPL-SCNC: 3.9 MMOL/L — SIGNIFICANT CHANGE UP (ref 3.5–5.3)
POTASSIUM SERPL-SCNC: 3.9 MMOL/L — SIGNIFICANT CHANGE UP (ref 3.5–5.3)
RBC # BLD: 2.53 M/UL — LOW (ref 4.2–5.8)
RBC # BLD: 2.53 M/UL — LOW (ref 4.2–5.8)
RBC # BLD: 2.57 M/UL — LOW (ref 4.2–5.8)
RBC # BLD: 2.57 M/UL — LOW (ref 4.2–5.8)
RBC # FLD: 15.9 % — HIGH (ref 10.3–14.5)
RBC # FLD: 15.9 % — HIGH (ref 10.3–14.5)
RBC # FLD: 16 % — HIGH (ref 10.3–14.5)
RBC # FLD: 16 % — HIGH (ref 10.3–14.5)
SODIUM SERPL-SCNC: 134 MMOL/L — LOW (ref 135–145)
SODIUM SERPL-SCNC: 134 MMOL/L — LOW (ref 135–145)
SODIUM SERPL-SCNC: 136 MMOL/L — SIGNIFICANT CHANGE UP (ref 135–145)
SODIUM SERPL-SCNC: 136 MMOL/L — SIGNIFICANT CHANGE UP (ref 135–145)
TROPONIN T, HIGH SENSITIVITY RESULT: 164 NG/L — CRITICAL HIGH
TROPONIN T, HIGH SENSITIVITY RESULT: 164 NG/L — CRITICAL HIGH
TROPONIN T, HIGH SENSITIVITY RESULT: 169 NG/L — CRITICAL HIGH
TROPONIN T, HIGH SENSITIVITY RESULT: 169 NG/L — CRITICAL HIGH
TROPONIN T, HIGH SENSITIVITY RESULT: 180 NG/L — CRITICAL HIGH
TROPONIN T, HIGH SENSITIVITY RESULT: 180 NG/L — CRITICAL HIGH
WBC # BLD: 13.31 K/UL — HIGH (ref 3.8–10.5)
WBC # BLD: 13.31 K/UL — HIGH (ref 3.8–10.5)
WBC # BLD: 13.65 K/UL — HIGH (ref 3.8–10.5)
WBC # BLD: 13.65 K/UL — HIGH (ref 3.8–10.5)
WBC # FLD AUTO: 13.31 K/UL — HIGH (ref 3.8–10.5)
WBC # FLD AUTO: 13.31 K/UL — HIGH (ref 3.8–10.5)
WBC # FLD AUTO: 13.65 K/UL — HIGH (ref 3.8–10.5)
WBC # FLD AUTO: 13.65 K/UL — HIGH (ref 3.8–10.5)

## 2023-12-31 PROCEDURE — 93010 ELECTROCARDIOGRAM REPORT: CPT

## 2023-12-31 PROCEDURE — 74018 RADEX ABDOMEN 1 VIEW: CPT | Mod: 26

## 2023-12-31 PROCEDURE — 71045 X-RAY EXAM CHEST 1 VIEW: CPT | Mod: 26

## 2023-12-31 PROCEDURE — 99233 SBSQ HOSP IP/OBS HIGH 50: CPT | Mod: GC

## 2023-12-31 RX ORDER — SIMETHICONE 80 MG/1
80 TABLET, CHEWABLE ORAL THREE TIMES A DAY
Refills: 0 | Status: DISCONTINUED | OUTPATIENT
Start: 2023-12-31 | End: 2024-01-01

## 2023-12-31 RX ORDER — MAGNESIUM SULFATE 500 MG/ML
2 VIAL (ML) INJECTION ONCE
Refills: 0 | Status: COMPLETED | OUTPATIENT
Start: 2023-12-31 | End: 2023-12-31

## 2023-12-31 RX ORDER — INSULIN LISPRO 100/ML
5 VIAL (ML) SUBCUTANEOUS
Refills: 0 | Status: DISCONTINUED | OUTPATIENT
Start: 2023-12-31 | End: 2024-01-01

## 2023-12-31 RX ORDER — INSULIN LISPRO 100/ML
VIAL (ML) SUBCUTANEOUS AT BEDTIME
Refills: 0 | Status: DISCONTINUED | OUTPATIENT
Start: 2024-01-01 | End: 2024-01-01

## 2023-12-31 RX ORDER — INSULIN LISPRO 100/ML
3 VIAL (ML) SUBCUTANEOUS
Refills: 0 | Status: DISCONTINUED | OUTPATIENT
Start: 2023-12-31 | End: 2023-12-31

## 2023-12-31 RX ORDER — POTASSIUM CHLORIDE 20 MEQ
10 PACKET (EA) ORAL
Refills: 0 | Status: COMPLETED | OUTPATIENT
Start: 2023-12-31 | End: 2023-12-31

## 2023-12-31 RX ORDER — INSULIN LISPRO 100/ML
VIAL (ML) SUBCUTANEOUS
Refills: 0 | Status: DISCONTINUED | OUTPATIENT
Start: 2023-12-31 | End: 2024-01-01

## 2023-12-31 RX ORDER — ACETAMINOPHEN 500 MG
650 TABLET ORAL EVERY 6 HOURS
Refills: 0 | Status: DISCONTINUED | OUTPATIENT
Start: 2023-12-31 | End: 2024-01-01

## 2023-12-31 RX ORDER — SUCRALFATE 1 G
1 TABLET ORAL
Refills: 0 | Status: DISCONTINUED | OUTPATIENT
Start: 2023-12-31 | End: 2024-01-01

## 2023-12-31 RX ORDER — INSULIN GLARGINE 100 [IU]/ML
14 INJECTION, SOLUTION SUBCUTANEOUS AT BEDTIME
Refills: 0 | Status: DISCONTINUED | OUTPATIENT
Start: 2023-12-31 | End: 2024-01-01

## 2023-12-31 RX ADMIN — PANTOPRAZOLE SODIUM 40 MILLIGRAM(S): 20 TABLET, DELAYED RELEASE ORAL at 11:40

## 2023-12-31 RX ADMIN — INSULIN GLARGINE 14 UNIT(S): 100 INJECTION, SOLUTION SUBCUTANEOUS at 23:00

## 2023-12-31 RX ADMIN — Medication 25 GRAM(S): at 14:53

## 2023-12-31 RX ADMIN — Medication 3 UNIT(S): at 11:39

## 2023-12-31 RX ADMIN — Medication 650 MILLIGRAM(S): at 20:31

## 2023-12-31 RX ADMIN — TICAGRELOR 60 MILLIGRAM(S): 90 TABLET ORAL at 18:21

## 2023-12-31 RX ADMIN — Medication 2: at 11:37

## 2023-12-31 RX ADMIN — TICAGRELOR 60 MILLIGRAM(S): 90 TABLET ORAL at 05:56

## 2023-12-31 RX ADMIN — Medication 100 MILLIEQUIVALENT(S): at 16:00

## 2023-12-31 RX ADMIN — PIPERACILLIN AND TAZOBACTAM 25 GRAM(S): 4; .5 INJECTION, POWDER, LYOPHILIZED, FOR SOLUTION INTRAVENOUS at 06:16

## 2023-12-31 RX ADMIN — Medication 1 GRAM(S): at 18:21

## 2023-12-31 RX ADMIN — CHLORHEXIDINE GLUCONATE 1 APPLICATION(S): 213 SOLUTION TOPICAL at 11:40

## 2023-12-31 RX ADMIN — Medication 3 UNIT(S): at 16:44

## 2023-12-31 RX ADMIN — Medication 650 MILLIGRAM(S): at 21:01

## 2023-12-31 RX ADMIN — LEVETIRACETAM 250 MILLIGRAM(S): 250 TABLET, FILM COATED ORAL at 05:56

## 2023-12-31 RX ADMIN — Medication 1 GRAM(S): at 23:01

## 2023-12-31 RX ADMIN — Medication 63.75 MILLIMOLE(S): at 18:14

## 2023-12-31 RX ADMIN — LEVETIRACETAM 250 MILLIGRAM(S): 250 TABLET, FILM COATED ORAL at 18:21

## 2023-12-31 RX ADMIN — Medication 25 MILLIGRAM(S): at 23:00

## 2023-12-31 RX ADMIN — MEMANTINE HYDROCHLORIDE 5 MILLIGRAM(S): 10 TABLET ORAL at 05:56

## 2023-12-31 RX ADMIN — Medication 25 MILLIGRAM(S): at 11:40

## 2023-12-31 RX ADMIN — PIPERACILLIN AND TAZOBACTAM 25 GRAM(S): 4; .5 INJECTION, POWDER, LYOPHILIZED, FOR SOLUTION INTRAVENOUS at 18:21

## 2023-12-31 RX ADMIN — Medication 100 MILLIEQUIVALENT(S): at 14:53

## 2023-12-31 RX ADMIN — RANOLAZINE 500 MILLIGRAM(S): 500 TABLET, FILM COATED, EXTENDED RELEASE ORAL at 18:21

## 2023-12-31 RX ADMIN — Medication 2: at 08:00

## 2023-12-31 RX ADMIN — RANOLAZINE 500 MILLIGRAM(S): 500 TABLET, FILM COATED, EXTENDED RELEASE ORAL at 05:56

## 2023-12-31 RX ADMIN — HEPARIN SODIUM 5000 UNIT(S): 5000 INJECTION INTRAVENOUS; SUBCUTANEOUS at 23:01

## 2023-12-31 RX ADMIN — HEPARIN SODIUM 5000 UNIT(S): 5000 INJECTION INTRAVENOUS; SUBCUTANEOUS at 13:13

## 2023-12-31 RX ADMIN — Medication 6 MILLIGRAM(S): at 23:00

## 2023-12-31 RX ADMIN — Medication 1: at 16:44

## 2023-12-31 RX ADMIN — ESCITALOPRAM OXALATE 10 MILLIGRAM(S): 10 TABLET, FILM COATED ORAL at 11:40

## 2023-12-31 RX ADMIN — Medication 100 MILLIEQUIVALENT(S): at 16:45

## 2023-12-31 RX ADMIN — ATORVASTATIN CALCIUM 80 MILLIGRAM(S): 80 TABLET, FILM COATED ORAL at 23:00

## 2023-12-31 RX ADMIN — HEPARIN SODIUM 5000 UNIT(S): 5000 INJECTION INTRAVENOUS; SUBCUTANEOUS at 05:57

## 2023-12-31 RX ADMIN — Medication 81 MILLIGRAM(S): at 11:40

## 2023-12-31 RX ADMIN — MEMANTINE HYDROCHLORIDE 5 MILLIGRAM(S): 10 TABLET ORAL at 18:22

## 2023-12-31 NOTE — PROGRESS NOTE ADULT - SUBJECTIVE AND OBJECTIVE BOX
NEPHROLOGY     Patient seen and examined with family at bedside, resting comfortably on room air, per family had mild pain/discomfort when swallowing last night, now on puree diet, currently in no acute distress.     MEDICATIONS  (STANDING):  aspirin  chewable 81 milliGRAM(s) Oral daily  atorvastatin 80 milliGRAM(s) Oral at bedtime  chlorhexidine 2% Cloths 1 Application(s) Topical daily  dextrose 50% Injectable 25 Gram(s) IV Push once  dextrose 50% Injectable 12.5 Gram(s) IV Push once  escitalopram 10 milliGRAM(s) Oral daily  heparin   Injectable 5000 Unit(s) SubCutaneous every 8 hours  insulin glargine Injectable (LANTUS) 12 Unit(s) SubCutaneous at bedtime  insulin lispro (ADMELOG) corrective regimen sliding scale   SubCutaneous three times a day before meals  insulin lispro (ADMELOG) corrective regimen sliding scale   SubCutaneous at bedtime  levETIRAcetam 250 milliGRAM(s) Oral two times a day  melatonin 6 milliGRAM(s) Oral at bedtime  memantine 5 milliGRAM(s) Oral two times a day  metoprolol tartrate 25 milliGRAM(s) Oral two times a day  pantoprazole    Tablet 40 milliGRAM(s) Oral daily  piperacillin/tazobactam IVPB.. 3.375 Gram(s) IV Intermittent every 12 hours  ranolazine 500 milliGRAM(s) Oral two times a day  ticagrelor 60 milliGRAM(s) Oral every 12 hours    VITALS:  T(C): , Max: 36.7 (12-31-23 @ 04:00)  T(F): , Max: 98 (12-31-23 @ 04:00)  HR: 81 (12-31-23 @ 04:00)  BP: 138/59 (12-31-23 @ 04:00)  BP(mean): 81 (12-31-23 @ 04:00)  RR: 21 (12-31-23 @ 04:00)  SpO2: 99% (12-31-23 @ 04:00)    I and O's:    12-30 @ 07:01  -  12-31 @ 07:00  --------------------------------------------------------  IN: 900 mL / OUT: 1401 mL / NET: -501 mL    PHYSICAL EXAM:  Constitutional: NAD.  HEENT: EOMI  Neck:  No JVD, supple   Respiratory: CTA B/L  Cardiovascular: S1 and S2, RRR  Gastrointestinal: + BS   Extremities: No peripheral edema, + peripheral pulses  Neurological:   Psychiatric: Normal mood, normal affect  : no Moss  Skin: No rashes, C/D/I    LABS:                        7.6    13.65 )-----------( 345      ( 31 Dec 2023 04:35 )             22.7     12-31    136  |  104  |  27<H>  ----------------------------<  204<H>  3.9   |  22  |  1.79<H>    Ca    7.5<L>      31 Dec 2023 04:35  Phos  2.7     12-31  Mg     2.00     12-31    TPro  6.1  /  Alb  2.3<L>  /  TBili  0.8  /  DBili  x   /  AST  29  /  ALT  13  /  AlkPhos  64  12-30    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) who presents to the hospital for COVID19 infection and chest pain  Abd distention   MABLE   Hypophosphatemia - improving s/p repletion      1 Renal - Creatinine stable, now off IVF   S/p CT with contrast   2 CV - BP improved/controlled, on lopressor 25 mg po bid, If possible start Norvasc if needed   3 Vasc - s/p SMA stent placement;   4 Sx - now on puree diet, s/p HIDA + for acute asa, medical management     Faby Cummins, MILIND  [facility]  [phone]    NEPHROLOGY     Patient seen and examined with family at bedside, resting comfortably on room air, per family had mild pain/discomfort when swallowing last night, now on puree diet, currently in no acute distress.     MEDICATIONS  (STANDING):  aspirin  chewable 81 milliGRAM(s) Oral daily  atorvastatin 80 milliGRAM(s) Oral at bedtime  chlorhexidine 2% Cloths 1 Application(s) Topical daily  dextrose 50% Injectable 25 Gram(s) IV Push once  dextrose 50% Injectable 12.5 Gram(s) IV Push once  escitalopram 10 milliGRAM(s) Oral daily  heparin   Injectable 5000 Unit(s) SubCutaneous every 8 hours  insulin glargine Injectable (LANTUS) 12 Unit(s) SubCutaneous at bedtime  insulin lispro (ADMELOG) corrective regimen sliding scale   SubCutaneous three times a day before meals  insulin lispro (ADMELOG) corrective regimen sliding scale   SubCutaneous at bedtime  levETIRAcetam 250 milliGRAM(s) Oral two times a day  melatonin 6 milliGRAM(s) Oral at bedtime  memantine 5 milliGRAM(s) Oral two times a day  metoprolol tartrate 25 milliGRAM(s) Oral two times a day  pantoprazole    Tablet 40 milliGRAM(s) Oral daily  piperacillin/tazobactam IVPB.. 3.375 Gram(s) IV Intermittent every 12 hours  ranolazine 500 milliGRAM(s) Oral two times a day  ticagrelor 60 milliGRAM(s) Oral every 12 hours    VITALS:  T(C): , Max: 36.7 (12-31-23 @ 04:00)  T(F): , Max: 98 (12-31-23 @ 04:00)  HR: 81 (12-31-23 @ 04:00)  BP: 138/59 (12-31-23 @ 04:00)  BP(mean): 81 (12-31-23 @ 04:00)  RR: 21 (12-31-23 @ 04:00)  SpO2: 99% (12-31-23 @ 04:00)    I and O's:    12-30 @ 07:01  -  12-31 @ 07:00  --------------------------------------------------------  IN: 900 mL / OUT: 1401 mL / NET: -501 mL    PHYSICAL EXAM:  Constitutional: NAD.  HEENT: EOMI  Neck:  No JVD, supple   Respiratory: CTA B/L  Cardiovascular: S1 and S2, RRR  Gastrointestinal: + BS   Extremities: No peripheral edema, + peripheral pulses  Neurological:   Psychiatric: Normal mood, normal affect  : no Moss  Skin: No rashes, C/D/I    LABS:                        7.6    13.65 )-----------( 345      ( 31 Dec 2023 04:35 )             22.7     12-31    136  |  104  |  27<H>  ----------------------------<  204<H>  3.9   |  22  |  1.79<H>    Ca    7.5<L>      31 Dec 2023 04:35  Phos  2.7     12-31  Mg     2.00     12-31    TPro  6.1  /  Alb  2.3<L>  /  TBili  0.8  /  DBili  x   /  AST  29  /  ALT  13  /  AlkPhos  64  12-30    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) who presents to the hospital for COVID19 infection and chest pain  Abd distention   MABLE   Hypophosphatemia - improving s/p repletion      1 Renal - Creatinine stable, now off IVF   S/p CT with contrast   2 CV - BP improved/controlled, on lopressor 25 mg po bid, If possible start Norvasc if needed   3 Vasc - s/p SMA stent placement;   4 Sx - now on puree diet, s/p HIDA + for acute asa, medical management     Faby Cummins, MILIND  [facility]  [phone]    NEPHROLOGY     Patient seen and examined with family at bedside, resting comfortably on room air, per family had mild pain/discomfort when swallowing last night, now on puree diet, currently in no acute distress.     MEDICATIONS  (STANDING):  aspirin  chewable 81 milliGRAM(s) Oral daily  atorvastatin 80 milliGRAM(s) Oral at bedtime  chlorhexidine 2% Cloths 1 Application(s) Topical daily  dextrose 50% Injectable 25 Gram(s) IV Push once  dextrose 50% Injectable 12.5 Gram(s) IV Push once  escitalopram 10 milliGRAM(s) Oral daily  heparin   Injectable 5000 Unit(s) SubCutaneous every 8 hours  insulin glargine Injectable (LANTUS) 12 Unit(s) SubCutaneous at bedtime  insulin lispro (ADMELOG) corrective regimen sliding scale   SubCutaneous three times a day before meals  insulin lispro (ADMELOG) corrective regimen sliding scale   SubCutaneous at bedtime  levETIRAcetam 250 milliGRAM(s) Oral two times a day  melatonin 6 milliGRAM(s) Oral at bedtime  memantine 5 milliGRAM(s) Oral two times a day  metoprolol tartrate 25 milliGRAM(s) Oral two times a day  pantoprazole    Tablet 40 milliGRAM(s) Oral daily  piperacillin/tazobactam IVPB.. 3.375 Gram(s) IV Intermittent every 12 hours  ranolazine 500 milliGRAM(s) Oral two times a day  ticagrelor 60 milliGRAM(s) Oral every 12 hours    VITALS:  T(C): , Max: 36.7 (12-31-23 @ 04:00)  T(F): , Max: 98 (12-31-23 @ 04:00)  HR: 81 (12-31-23 @ 04:00)  BP: 138/59 (12-31-23 @ 04:00)  BP(mean): 81 (12-31-23 @ 04:00)  RR: 21 (12-31-23 @ 04:00)  SpO2: 99% (12-31-23 @ 04:00)    I and O's:    12-30 @ 07:01  -  12-31 @ 07:00  --------------------------------------------------------  IN: 900 mL / OUT: 1401 mL / NET: -501 mL    PHYSICAL EXAM:  Constitutional: NAD.  HEENT: EOMI  Neck:  No JVD, supple   Respiratory: CTA B/L  Cardiovascular: S1 and S2, RRR  Gastrointestinal: + BS   Extremities: No peripheral edema, + peripheral pulses  Neurological:   Psychiatric: Normal mood, normal affect  : no Moss  Skin: No rashes, C/D/I    LABS:                        7.6    13.65 )-----------( 345      ( 31 Dec 2023 04:35 )             22.7     12-31    136  |  104  |  27<H>  ----------------------------<  204<H>  3.9   |  22  |  1.79<H>    Ca    7.5<L>      31 Dec 2023 04:35  Phos  2.7     12-31  Mg     2.00     12-31    TPro  6.1  /  Alb  2.3<L>  /  TBili  0.8  /  DBili  x   /  AST  29  /  ALT  13  /  AlkPhos  64  12-30    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) who presents to the hospital for COVID19 infection and chest pain  Abd distention   MABLE   Hypophosphatemia - improving s/p repletion      1 Renal - Creatinine stable, now off IVF   S/p CT with contrast   2 CV - BP improved/controlled, on lopressor 25 mg po bid, If possible start Norvasc if needed   3 Vasc - s/p SMA stent placement;   4 Sx - now on puree diet, s/p HIDA + for acute asa, medical management   follow up CXR     Faby Cummins, [facility]  [phone]    NEPHROLOGY     Patient seen and examined with family at bedside, resting comfortably on room air, per family had mild pain/discomfort when swallowing last night, now on puree diet, currently in no acute distress.     MEDICATIONS  (STANDING):  aspirin  chewable 81 milliGRAM(s) Oral daily  atorvastatin 80 milliGRAM(s) Oral at bedtime  chlorhexidine 2% Cloths 1 Application(s) Topical daily  dextrose 50% Injectable 25 Gram(s) IV Push once  dextrose 50% Injectable 12.5 Gram(s) IV Push once  escitalopram 10 milliGRAM(s) Oral daily  heparin   Injectable 5000 Unit(s) SubCutaneous every 8 hours  insulin glargine Injectable (LANTUS) 12 Unit(s) SubCutaneous at bedtime  insulin lispro (ADMELOG) corrective regimen sliding scale   SubCutaneous three times a day before meals  insulin lispro (ADMELOG) corrective regimen sliding scale   SubCutaneous at bedtime  levETIRAcetam 250 milliGRAM(s) Oral two times a day  melatonin 6 milliGRAM(s) Oral at bedtime  memantine 5 milliGRAM(s) Oral two times a day  metoprolol tartrate 25 milliGRAM(s) Oral two times a day  pantoprazole    Tablet 40 milliGRAM(s) Oral daily  piperacillin/tazobactam IVPB.. 3.375 Gram(s) IV Intermittent every 12 hours  ranolazine 500 milliGRAM(s) Oral two times a day  ticagrelor 60 milliGRAM(s) Oral every 12 hours    VITALS:  T(C): , Max: 36.7 (12-31-23 @ 04:00)  T(F): , Max: 98 (12-31-23 @ 04:00)  HR: 81 (12-31-23 @ 04:00)  BP: 138/59 (12-31-23 @ 04:00)  BP(mean): 81 (12-31-23 @ 04:00)  RR: 21 (12-31-23 @ 04:00)  SpO2: 99% (12-31-23 @ 04:00)    I and O's:    12-30 @ 07:01  -  12-31 @ 07:00  --------------------------------------------------------  IN: 900 mL / OUT: 1401 mL / NET: -501 mL    PHYSICAL EXAM:  Constitutional: NAD.  HEENT: EOMI  Neck:  No JVD, supple   Respiratory: CTA B/L  Cardiovascular: S1 and S2, RRR  Gastrointestinal: + BS   Extremities: No peripheral edema, + peripheral pulses  Neurological:   Psychiatric: Normal mood, normal affect  : no Moss  Skin: No rashes, C/D/I    LABS:                        7.6    13.65 )-----------( 345      ( 31 Dec 2023 04:35 )             22.7     12-31    136  |  104  |  27<H>  ----------------------------<  204<H>  3.9   |  22  |  1.79<H>    Ca    7.5<L>      31 Dec 2023 04:35  Phos  2.7     12-31  Mg     2.00     12-31    TPro  6.1  /  Alb  2.3<L>  /  TBili  0.8  /  DBili  x   /  AST  29  /  ALT  13  /  AlkPhos  64  12-30    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

## 2023-12-31 NOTE — PROGRESS NOTE ADULT - SUBJECTIVE AND OBJECTIVE BOX
Aashish Boyer MD  Interventional Cardiology / Advance Heart Failure and Cardiac Transplant Specialist  [place] Office : [address] [place] [address] [place] [address]  Tel:   [place] Office : [address] [facility] [place] [address]  Tel: [phone]       Pt is lying in bed comfortable not in distress, no chest pains no SOB no palpitations  	  MEDICATIONS:  aspirin  chewable 81 milliGRAM(s) Oral daily  heparin   Injectable 5000 Unit(s) SubCutaneous every 8 hours  metoprolol tartrate 25 milliGRAM(s) Oral two times a day  ranolazine 500 milliGRAM(s) Oral two times a day  ticagrelor 60 milliGRAM(s) Oral every 12 hours  piperacillin/tazobactam IVPB.. 3.375 Gram(s) IV Intermittent every 12 hours  escitalopram 10 milliGRAM(s) Oral daily  levETIRAcetam 250 milliGRAM(s) Oral two times a day  melatonin 6 milliGRAM(s) Oral at bedtime  memantine 5 milliGRAM(s) Oral two times a day  pantoprazole    Tablet 40 milliGRAM(s) Oral daily  atorvastatin 80 milliGRAM(s) Oral at bedtime  dextrose 50% Injectable 25 Gram(s) IV Push once  dextrose 50% Injectable 12.5 Gram(s) IV Push once  insulin glargine Injectable (LANTUS) 14 Unit(s) SubCutaneous at bedtime  insulin lispro (ADMELOG) corrective regimen sliding scale   SubCutaneous three times a day before meals  insulin lispro (ADMELOG) corrective regimen sliding scale   SubCutaneous at bedtime  insulin lispro Injectable (ADMELOG) 3 Unit(s) SubCutaneous three times a day before meals  chlorhexidine 2% Cloths 1 Application(s) Topical daily  potassium chloride  10 mEq/100 mL IVPB 10 milliEquivalent(s) IV Intermittent every 1 hour  sodium phosphate 15 milliMole(s)/250 mL IVPB 15 milliMole(s) IV Intermittent once      PAST MEDICAL/SURGICAL HISTORY  PAST MEDICAL & SURGICAL HISTORY:  Hyperlipemia      Hypertension      Coronary Artery Disease      Diabetes Mellitus Type II      Stented Coronary Artery  total 5 stents, last stent 5/2019      Neuropathy      Myocardial infarction      Stroke  mild left facial numbness   no other residuals verbalized      Myoclonic jerking      Stage 3 chronic kidney disease      History of Cataract Extraction      Hx of CABG      H/O coronary angiogram      S/P coronary artery stent placement  1/6/09      S/P placement of cardiac pacemaker          SOCIAL HISTORY: Substance Use (street drugs): ( x ) never used  (  ) other:    FAMILY HISTORY:  No pertinent family history in first degree relatives            PHYSICAL EXAM:  T(C): 37.5 (12-31-23 @ 12:00), Max: 37.5 (12-31-23 @ 12:00)  HR: 84 (12-31-23 @ 15:00) (75 - 85)  BP: 161/80 (12-31-23 @ 15:00) (138/59 - 161/80)  RR: 29 (12-31-23 @ 15:00) (17 - 29)  SpO2: 98% (12-31-23 @ 15:00) (94% - 99%)  Wt(kg): --  I&O's Summary    30 Dec 2023 07:01  -  31 Dec 2023 07:00  --------------------------------------------------------  IN: 900 mL / OUT: 1401 mL / NET: -501 mL    31 Dec 2023 07:01  -  31 Dec 2023 16:43  --------------------------------------------------------  IN: 630 mL / OUT: 600 mL / NET: 30 mL           EYES:   PERRLA   ENMT:   Moist mucous membranes, Good dentition, No lesions  Cardiovascular: Normal S1 S2, No JVD, No murmurs, No edema  Respiratory: Lungs clear to auscultation	  Gastrointestinal:  s/p surgery   Extremities: no edema                                    7.8    13.31 )-----------( 359      ( 31 Dec 2023 11:23 )             23.0     12-31    134<L>  |  101  |  25<H>  ----------------------------<  203<H>  3.6   |  21<L>  |  1.74<H>    Ca    7.5<L>      31 Dec 2023 11:23  Phos  2.2     12-31  Mg     1.90     12-31    TPro  6.1  /  Alb  2.3<L>  /  TBili  0.8  /  DBili  x   /  AST  29  /  ALT  13  /  AlkPhos  64  12-30    proBNP:   Lipid Profile:   HgA1c:   TSH:     Consultant(s) Notes Reviewed:  [x ] YES  [ ] NO    Care Discussed with Consultants/Other Providers [ x] YES  [ ] NO    Imaging Personally Reviewed independently:  [x] YES  [ ] NO    All labs, radiologic studies, vitals, orders and medications list reviewed. Patient is seen and examined at bedside. Case discussed with medical team.         Aashish Boyer MD  Interventional Cardiology / Advance Heart Failure and Cardiac Transplant Specialist  [place] Office : [address] [place] [address] [place] [address]  Tel:   [place] Office : [address] [facility] [place] [address]  Tel: [phone]       Pt is lying in bed comfortable not in distress, no chest pains no SOB no palpitations  	  MEDICATIONS:  aspirin  chewable 81 milliGRAM(s) Oral daily  heparin   Injectable 5000 Unit(s) SubCutaneous every 8 hours  metoprolol tartrate 25 milliGRAM(s) Oral two times a day  ranolazine 500 milliGRAM(s) Oral two times a day  ticagrelor 60 milliGRAM(s) Oral every 12 hours  piperacillin/tazobactam IVPB.. 3.375 Gram(s) IV Intermittent every 12 hours  escitalopram 10 milliGRAM(s) Oral daily  levETIRAcetam 250 milliGRAM(s) Oral two times a day  melatonin 6 milliGRAM(s) Oral at bedtime  memantine 5 milliGRAM(s) Oral two times a day  pantoprazole    Tablet 40 milliGRAM(s) Oral daily  atorvastatin 80 milliGRAM(s) Oral at bedtime  dextrose 50% Injectable 25 Gram(s) IV Push once  dextrose 50% Injectable 12.5 Gram(s) IV Push once  insulin glargine Injectable (LANTUS) 14 Unit(s) SubCutaneous at bedtime  insulin lispro (ADMELOG) corrective regimen sliding scale   SubCutaneous three times a day before meals  insulin lispro (ADMELOG) corrective regimen sliding scale   SubCutaneous at bedtime  insulin lispro Injectable (ADMELOG) 3 Unit(s) SubCutaneous three times a day before meals  chlorhexidine 2% Cloths 1 Application(s) Topical daily  potassium chloride  10 mEq/100 mL IVPB 10 milliEquivalent(s) IV Intermittent every 1 hour  sodium phosphate 15 milliMole(s)/250 mL IVPB 15 milliMole(s) IV Intermittent once      PAST MEDICAL/SURGICAL HISTORY  PAST MEDICAL & SURGICAL HISTORY:  Hyperlipemia      Hypertension      Coronary Artery Disease      Diabetes Mellitus Type II      Stented Coronary Artery  total 5 stents, last stent 5/2019      Neuropathy      Myocardial infarction      Stroke  mild left facial numbness   no other residuals verbalized      Myoclonic jerking      Stage 3 chronic kidney disease      History of Cataract Extraction      Hx of CABG      H/O coronary angiogram      S/P coronary artery stent placement  1/6/09      S/P placement of cardiac pacemaker          SOCIAL HISTORY: Substance Use (street drugs): ( x ) never used  (  ) other:    FAMILY HISTORY:  No pertinent family history in first degree relatives            PHYSICAL EXAM:  T(C): 37.5 (12-31-23 @ 12:00), Max: 37.5 (12-31-23 @ 12:00)  HR: 84 (12-31-23 @ 15:00) (75 - 85)  BP: 161/80 (12-31-23 @ 15:00) (138/59 - 161/80)  RR: 29 (12-31-23 @ 15:00) (17 - 29)  SpO2: 98% (12-31-23 @ 15:00) (94% - 99%)  Wt(kg): --  I&O's Summary    30 Dec 2023 07:01  -  31 Dec 2023 07:00  --------------------------------------------------------  IN: 900 mL / OUT: 1401 mL / NET: -501 mL    31 Dec 2023 07:01  -  31 Dec 2023 16:43  --------------------------------------------------------  IN: 630 mL / OUT: 600 mL / NET: 30 mL           EYES:   PERRLA   ENMT:   Moist mucous membranes, Good dentition, No lesions  Cardiovascular: Normal S1 S2, No JVD, No murmurs, No edema  Respiratory: Lungs clear to auscultation	  Gastrointestinal:  s/p surgery   Extremities: no edema                                    7.8    13.31 )-----------( 359      ( 31 Dec 2023 11:23 )             23.0     12-31    134<L>  |  101  |  25<H>  ----------------------------<  203<H>  3.6   |  21<L>  |  1.74<H>    Ca    7.5<L>      31 Dec 2023 11:23  Phos  2.2     12-31  Mg     1.90     12-31    TPro  6.1  /  Alb  2.3<L>  /  TBili  0.8  /  DBili  x   /  AST  29  /  ALT  13  /  AlkPhos  64  12-30    proBNP:   Lipid Profile:   HgA1c:   TSH:     Consultant(s) Notes Reviewed:  [x ] YES  [ ] NO    Care Discussed with Consultants/Other Providers [ x] YES  [ ] NO    Imaging Personally Reviewed independently:  [x] YES  [ ] NO    All labs, radiologic studies, vitals, orders and medications list reviewed. Patient is seen and examined at bedside. Case discussed with medical team.

## 2023-12-31 NOTE — PROGRESS NOTE ADULT - ATTENDING COMMENTS
I agree with the history, physical, and plan, which I have reviewed and edited where appropriate.  I agree with notes/assessment of health care providers on my service.  I have personally examined the patient.  I was physically present for the key portions of the evaluation and management (E/M) service provided.  I reviewed data and laboratory tests/x-rays and all pertinent electronic images.  The patient is a critical care patient with life threatening hemodynamic and metabolic instability in SICU.  Risk benefit analyses discussed.    The patient is in SICU with diagnosis mentioned in the note.    The plan is specified below.    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 CTA AP demonstrated SMA stenosis. Limited evaluation of its distal branches. Patient taken emergently to OR on 12/24 with general surgery and vascular surgery. Patient s/p diagnostic laparoscopy and SMA stent placement with brachial cutdown. SICU consulted postoperatively for HD monitoring, now listed for floor. Acute cholecystitis being treated with antibiotics.     PLAN:     NEUROLOGY: post-operative pain   - pain control tylenol   - escitalopram  - Keppra   - memantidine     RESPIRATORY:  - nebulizer q6h    CARDIOVASCULAR: HTN, chest pain   - Atorvastatin   -increase metoprolol 50 bid   - troponins, cardiology consult     GI / NUTRITION:  acute cholecystitis   - Diet: pureed diet     RENAL / GENITOURINARY:  - IV lock   - voiding    HEMATOLOGIC: SMA stent   - monitor H/H  - DVT ppx: heparin sq  - ticagrelor and asa    INFECTIOUS DISEASE: Acute cholecystitis  - Zosyn x 7 d     ENDOCRINOLOGY: DM, hypoglycemia   - increase lantus 12 to 14   - add pre-meal 3U lispro  - low dose insulin sliding scale

## 2023-12-31 NOTE — PROGRESS NOTE ADULT - ASSESSMENT
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 Surgery consulted on 12/24 for abdominal pain and distension. CTA AP obtained which showed  partially occlusive calcified and non-calcified plaque just distal to take-off of the SMA, with estimated at least 75% luminal narrowing. Limited evaluation of its distal branches. Patient taken emergently to OR on 12/24 with general surgery and vascular surgery. Patient s/p diagnostic laparoscopy and SMA stent placement with brachial cutdown. SICU consulted postoperatively for HD monitoring, now listed for floor.      PLAN:   Neurologic:  -A&Ox2 (baseline per family)   -Continue Keppra- hx of myoclonic jerks   -Pain: Tylenol  -ASA 81 for hx CVA, stents  -Melatonin 6 qhs  -Lexapro 10  -Memantine 5 BID    Respiratory:  -Maintain O2 saturation >92%  -COVID + (12/20)    Cardiovascular:  -MAP goal >65  -Metoprolol 25 bid and home ranolazine   -ASA 81 qd  -Lipitor 80    Gastrointestinal/Nutrition:  -Diet: Regular   -F/u RUQ US 12/26-> GB distended with cholelithiasis, equivocal   -Trial abx for cholecystitis, no plan for percutaneous cholecystostomy at this time  -HIDA technically positive, however patient has been NPO for several days and is non-tender      Genitourinary/Renal:  - Hx of CKD (Baseline Cr 1.2-1.3)   - Monitor strict I/Os     Hematologic:  -DVT ppx: SQH   -ASA 81 PO for hx stents, CVA x3  -Brilinta qd    Infectious Disease:  -Abx: Zosyn through 1/1, s/p remdesivir   -Monitor WBC   -Monitor fever curve     Endocrine:  -T2DM   -ISS, Lantus 12U   -Monitor blood glucose     Disposition: Listed

## 2023-12-31 NOTE — PROGRESS NOTE ADULT - SUBJECTIVE AND OBJECTIVE BOX
GENERAL SURGERY PROGRESS NOTE    Patient: SHAIK DONOHUE , 89y ([birth year])Male   MRN: [id number]  Location: [facility] 03  Visit: 12-23-23 Inpatient  Date: 12-31-23 @ 13:29    Subjective: No acute events overnight. Patient resting comfortably in bed, no complaints. Passing gas, having bowel function. No N/V.     PAST MEDICAL & SURGICAL HISTORY:  Hyperlipemia      Hypertension      Coronary Artery Disease      Diabetes Mellitus Type II      Stented Coronary Artery  total 5 stents, last stent 5/2019      Neuropathy      Myocardial infarction      Stroke  mild left facial numbness   no other residuals verbalized      Myoclonic jerking      Stage 3 chronic kidney disease      History of Cataract Extraction      Hx of CABG      H/O coronary angiogram      S/P coronary artery stent placement  1/6/09      S/P placement of cardiac pacemaker          Vitals: T(F): 99.5 (12-31-23 @ 12:00), Max: 99.5 (12-31-23 @ 12:00)  HR: 82 (12-31-23 @ 12:00)  BP: 148/51 (12-31-23 @ 12:00)  RR: 25 (12-31-23 @ 12:00)  SpO2: 95% (12-31-23 @ 12:00)      Diet, Pureed      12-30-23 @ 07:01  -  12-31-23 @ 07:00  --------------------------------------------------------  IN:    IV PiggyBack: 100 mL    Oral Fluid: 800 mL  Total IN: 900 mL    OUT:    Stool (mL): 1 mL    Voided (mL): 1400 mL  Total OUT: 1401 mL    Total NET: -501 mL          PHYSICAL EXAM  GEN: No acute distress   CV: RRR, no JVD  LUNGS: Respirations unlabored, no use of accessory muscles  ABD: Abdomen soft, NT, ND  EXT: No peripheral edema. Regular range of motion.     MEDICATIONS  (STANDING):  aspirin  chewable 81 milliGRAM(s) Oral daily  atorvastatin 80 milliGRAM(s) Oral at bedtime  chlorhexidine 2% Cloths 1 Application(s) Topical daily  dextrose 50% Injectable 25 Gram(s) IV Push once  dextrose 50% Injectable 12.5 Gram(s) IV Push once  escitalopram 10 milliGRAM(s) Oral daily  heparin   Injectable 5000 Unit(s) SubCutaneous every 8 hours  insulin glargine Injectable (LANTUS) 14 Unit(s) SubCutaneous at bedtime  insulin lispro (ADMELOG) corrective regimen sliding scale   SubCutaneous three times a day before meals  insulin lispro (ADMELOG) corrective regimen sliding scale   SubCutaneous at bedtime  insulin lispro Injectable (ADMELOG) 3 Unit(s) SubCutaneous three times a day before meals  levETIRAcetam 250 milliGRAM(s) Oral two times a day  melatonin 6 milliGRAM(s) Oral at bedtime  memantine 5 milliGRAM(s) Oral two times a day  metoprolol tartrate 25 milliGRAM(s) Oral two times a day  pantoprazole    Tablet 40 milliGRAM(s) Oral daily  piperacillin/tazobactam IVPB.. 3.375 Gram(s) IV Intermittent every 12 hours  ranolazine 500 milliGRAM(s) Oral two times a day  ticagrelor 60 milliGRAM(s) Oral every 12 hours    MEDICATIONS  (PRN):      DVT PROPHYLAXIS: SCDs, heparin   Injectable 5000 Unit(s) SubCutaneous every 8 hours    GI PROPHYLAXIS: pantoprazole    Tablet 40 milliGRAM(s) Oral daily    ANTICOAGULATION:   ANTIBIOTICS: piperacillin/tazobactam IVPB.. 3.375 Gram(s)        LAB/STUDIES:  CAPILLARY BLOOD GLUCOSE      POCT Blood Glucose.: 217 mg/dL (31 Dec 2023 11:24)  POCT Blood Glucose.: 208 mg/dL (31 Dec 2023 08:04)  POCT Blood Glucose.: 175 mg/dL (30 Dec 2023 22:20)  POCT Blood Glucose.: 153 mg/dL (30 Dec 2023 16:51)                          7.8    13.31 )-----------( 359      ( 31 Dec 2023 11:23 )             23.0     12-31    134<L>  |  101  |  25<H>  ----------------------------<  203<H>  3.6   |  21<L>  |  1.74<H>    Ca    7.5<L>      31 Dec 2023 11:23  Phos  2.2     12-31  Mg     1.90     12-31    TPro  6.1  /  Alb  2.3<L>  /  TBili  0.8  /  DBili  x   /  AST  29  /  ALT  13  /  AlkPhos  64  12-30               6.1  | 0.8  | 29       ------------------[64      ( 30 Dec 2023 06:20 )  2.3  | x    | 13          Lipase:x      Amylase:x          Urinalysis Basic - ( 31 Dec 2023 11:23 )    Color: x / Appearance: x / SG: x / pH: x  Gluc: 203 mg/dL / Ketone: x  / Bili: x / Urobili: x   Blood: x / Protein: x / Nitrite: x   Leuk Esterase: x / RBC: x / WBC x   Sq Epi: x / Non Sq Epi: x / Bacteria: x      CARDIAC MARKERS ( 31 Dec 2023 11:23 )  x     / x     / 81 U/L / x     / 2.8 ng/mL      Assessment:   89M w/ PMHx CAD (s/p CABG, s/p stents on ASA/brillinta), s/p PPM, DM2, CKD (baseline Cr 1.2-1.3 as per family), PVD, HTN, HLD, CVA x3 (without residual deficits), and 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 CTA demonstrating mesenteric fat stranding associated with ascending/transverse colon. S/p SMA angiography with stent placement with diagnostic laparoscopy 12/24, small bowel and visible colon viable, some inflammation of omentum in RUQ.     Plan:   - Pain control PRN  - HIDA +, treating cholecystitis with medical management due to poor surgical candidate for lap asa   - reg diet  - awaiting transfer to floor  - Appreciate excellent care per SICU     C Team Surgery   [id number]   GENERAL SURGERY PROGRESS NOTE    Patient: SHAIK DONOHUE , 89y ([birth year])Male   MRN: [id number]  Location: [facility] 03  Visit: 12-23-23 Inpatient  Date: 12-31-23 @ 13:29    Subjective: No acute events overnight. Patient resting comfortably in bed, no complaints. Passing gas, having bowel function. No N/V.     PAST MEDICAL & SURGICAL HISTORY:  Hyperlipemia      Hypertension      Coronary Artery Disease      Diabetes Mellitus Type II      Stented Coronary Artery  total 5 stents, last stent 5/2019      Neuropathy      Myocardial infarction      Stroke  mild left facial numbness   no other residuals verbalized      Myoclonic jerking      Stage 3 chronic kidney disease      History of Cataract Extraction      Hx of CABG      H/O coronary angiogram      S/P coronary artery stent placement  1/6/09      S/P placement of cardiac pacemaker          Vitals: T(F): 99.5 (12-31-23 @ 12:00), Max: 99.5 (12-31-23 @ 12:00)  HR: 82 (12-31-23 @ 12:00)  BP: 148/51 (12-31-23 @ 12:00)  RR: 25 (12-31-23 @ 12:00)  SpO2: 95% (12-31-23 @ 12:00)      Diet, Pureed      12-30-23 @ 07:01  -  12-31-23 @ 07:00  --------------------------------------------------------  IN:    IV PiggyBack: 100 mL    Oral Fluid: 800 mL  Total IN: 900 mL    OUT:    Stool (mL): 1 mL    Voided (mL): 1400 mL  Total OUT: 1401 mL    Total NET: -501 mL          PHYSICAL EXAM  GEN: No acute distress   CV: RRR, no JVD  LUNGS: Respirations unlabored, no use of accessory muscles  ABD: Abdomen soft, NT, ND  EXT: No peripheral edema. Regular range of motion.     MEDICATIONS  (STANDING):  aspirin  chewable 81 milliGRAM(s) Oral daily  atorvastatin 80 milliGRAM(s) Oral at bedtime  chlorhexidine 2% Cloths 1 Application(s) Topical daily  dextrose 50% Injectable 25 Gram(s) IV Push once  dextrose 50% Injectable 12.5 Gram(s) IV Push once  escitalopram 10 milliGRAM(s) Oral daily  heparin   Injectable 5000 Unit(s) SubCutaneous every 8 hours  insulin glargine Injectable (LANTUS) 14 Unit(s) SubCutaneous at bedtime  insulin lispro (ADMELOG) corrective regimen sliding scale   SubCutaneous three times a day before meals  insulin lispro (ADMELOG) corrective regimen sliding scale   SubCutaneous at bedtime  insulin lispro Injectable (ADMELOG) 3 Unit(s) SubCutaneous three times a day before meals  levETIRAcetam 250 milliGRAM(s) Oral two times a day  melatonin 6 milliGRAM(s) Oral at bedtime  memantine 5 milliGRAM(s) Oral two times a day  metoprolol tartrate 25 milliGRAM(s) Oral two times a day  pantoprazole    Tablet 40 milliGRAM(s) Oral daily  piperacillin/tazobactam IVPB.. 3.375 Gram(s) IV Intermittent every 12 hours  ranolazine 500 milliGRAM(s) Oral two times a day  ticagrelor 60 milliGRAM(s) Oral every 12 hours    MEDICATIONS  (PRN):      DVT PROPHYLAXIS: SCDs, heparin   Injectable 5000 Unit(s) SubCutaneous every 8 hours    GI PROPHYLAXIS: pantoprazole    Tablet 40 milliGRAM(s) Oral daily    ANTICOAGULATION:   ANTIBIOTICS: piperacillin/tazobactam IVPB.. 3.375 Gram(s)        LAB/STUDIES:  CAPILLARY BLOOD GLUCOSE      POCT Blood Glucose.: 217 mg/dL (31 Dec 2023 11:24)  POCT Blood Glucose.: 208 mg/dL (31 Dec 2023 08:04)  POCT Blood Glucose.: 175 mg/dL (30 Dec 2023 22:20)  POCT Blood Glucose.: 153 mg/dL (30 Dec 2023 16:51)                          7.8    13.31 )-----------( 359      ( 31 Dec 2023 11:23 )             23.0     12-31    134<L>  |  101  |  25<H>  ----------------------------<  203<H>  3.6   |  21<L>  |  1.74<H>    Ca    7.5<L>      31 Dec 2023 11:23  Phos  2.2     12-31  Mg     1.90     12-31    TPro  6.1  /  Alb  2.3<L>  /  TBili  0.8  /  DBili  x   /  AST  29  /  ALT  13  /  AlkPhos  64  12-30               6.1  | 0.8  | 29       ------------------[64      ( 30 Dec 2023 06:20 )  2.3  | x    | 13          Lipase:x      Amylase:x          Urinalysis Basic - ( 31 Dec 2023 11:23 )    Color: x / Appearance: x / SG: x / pH: x  Gluc: 203 mg/dL / Ketone: x  / Bili: x / Urobili: x   Blood: x / Protein: x / Nitrite: x   Leuk Esterase: x / RBC: x / WBC x   Sq Epi: x / Non Sq Epi: x / Bacteria: x      CARDIAC MARKERS ( 31 Dec 2023 11:23 )  x     / x     / 81 U/L / x     / 2.8 ng/mL      Assessment:   89M w/ PMHx CAD (s/p CABG, s/p stents on ASA/brillinta), s/p PPM, DM2, CKD (baseline Cr 1.2-1.3 as per family), PVD, HTN, HLD, CVA x3 (without residual deficits), and 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 CTA demonstrating mesenteric fat stranding associated with ascending/transverse colon. S/p SMA angiography with stent placement with diagnostic laparoscopy 12/24, small bowel and visible colon viable, some inflammation of omentum in RUQ.     Plan:   - Pain control PRN  - HIDA +, treating cholecystitis with medical management due to poor surgical candidate for lap asa   - reg diet  - awaiting transfer to floor  - Appreciate excellent care per SICU     C Team Surgery   [id number]

## 2023-12-31 NOTE — PROGRESS NOTE ADULT - SUBJECTIVE AND OBJECTIVE BOX
SICU Progress Note    Overnight events: None     SUBJECTIVE: Pt seen and examined at bedside. Patient comfortable and in no-apparent distress. No nausea, vomiting. Pain is controlled. +Gas/+BM. Tolerating diet.    Vital Signs Last 24 Hrs  T(C): 35.9 (30 Dec 2023 20:00), Max: 36.3 (30 Dec 2023 08:00)  T(F): 96.7 (30 Dec 2023 20:00), Max: 97.3 (30 Dec 2023 08:00)  HR: 81 (30 Dec 2023 20:00) (71 - 81)  BP: 139/78 (30 Dec 2023 20:00) (103/43 - 139/78)  BP(mean): 84 (30 Dec 2023 20:00) (62 - 103)  RR: 18 (30 Dec 2023 20:00) (15 - 22)  SpO2: 96% (30 Dec 2023 20:00) (96% - 100%)    Parameters below as of 30 Dec 2023 20:00  Patient On (Oxygen Delivery Method): room air      Physical Exam:  General Appearance: Appears well, NAD  Respiratory: No labored breathing  CV: Pulse regularly present  Abdomen: Soft, nontender, incision c/d/i    LABS:                        8.0    16.31 )-----------( 326      ( 30 Dec 2023 06:20 )             22.8     12-30    136  |  106  |  30<H>  ----------------------------<  65<L>  4.1   |  17<L>  |  1.78<H>    Ca    7.6<L>      30 Dec 2023 06:20  Phos  2.4     12-30  Mg     2.00     12-30    TPro  6.1  /  Alb  2.3<L>  /  TBili  0.8  /  DBili  x   /  AST  29  /  ALT  13  /  AlkPhos  64  12-30    PT/INR - ( 29 Dec 2023 06:28 )   PT: 13.8 sec;   INR: 1.23 ratio         PTT - ( 29 Dec 2023 06:28 )  PTT:34.1 sec  Urinalysis Basic - ( 30 Dec 2023 06:20 )    Color: x / Appearance: x / SG: x / pH: x  Gluc: 65 mg/dL / Ketone: x  / Bili: x / Urobili: x   Blood: x / Protein: x / Nitrite: x   Leuk Esterase: x / RBC: x / WBC x   Sq Epi: x / Non Sq Epi: x / Bacteria: x        INs and OUTs:    12-29-23 @ 07:01  -  12-30-23 @ 07:00  --------------------------------------------------------  IN: 430 mL / OUT: 750 mL / NET: -320 mL    12-30-23 @ 07:01  -  12-31-23 @ 00:15  --------------------------------------------------------  IN: 900 mL / OUT: 500 mL / NET: 400 mL

## 2024-01-01 ENCOUNTER — INPATIENT (INPATIENT)
Facility: HOSPITAL | Age: 89
LOS: 7 days | End: 2024-08-26
Attending: STUDENT IN AN ORGANIZED HEALTH CARE EDUCATION/TRAINING PROGRAM | Admitting: STUDENT IN AN ORGANIZED HEALTH CARE EDUCATION/TRAINING PROGRAM
Payer: MEDICARE

## 2024-01-01 ENCOUNTER — INPATIENT (INPATIENT)
Facility: HOSPITAL | Age: 89
LOS: 10 days | Discharge: ANOTHER HOSPITAL | End: 2024-08-18
Attending: STUDENT IN AN ORGANIZED HEALTH CARE EDUCATION/TRAINING PROGRAM | Admitting: HOSPITALIST
Payer: MEDICARE

## 2024-01-01 VITALS
HEIGHT: 69 IN | TEMPERATURE: 99 F | RESPIRATION RATE: 18 BRPM | HEART RATE: 90 BPM | WEIGHT: 175.71 LBS | SYSTOLIC BLOOD PRESSURE: 121 MMHG | DIASTOLIC BLOOD PRESSURE: 49 MMHG | OXYGEN SATURATION: 100 %

## 2024-01-01 VITALS
WEIGHT: 315 LBS | HEIGHT: 69 IN | DIASTOLIC BLOOD PRESSURE: 56 MMHG | OXYGEN SATURATION: 97 % | TEMPERATURE: 101 F | SYSTOLIC BLOOD PRESSURE: 96 MMHG | RESPIRATION RATE: 19 BRPM | HEART RATE: 87 BPM

## 2024-01-01 VITALS
RESPIRATION RATE: 25 BRPM | DIASTOLIC BLOOD PRESSURE: 55 MMHG | TEMPERATURE: 99 F | HEART RATE: 122 BPM | OXYGEN SATURATION: 90 % | SYSTOLIC BLOOD PRESSURE: 104 MMHG

## 2024-01-01 VITALS
TEMPERATURE: 100 F | RESPIRATION RATE: 17 BRPM | HEART RATE: 98 BPM | SYSTOLIC BLOOD PRESSURE: 131 MMHG | OXYGEN SATURATION: 96 % | DIASTOLIC BLOOD PRESSURE: 61 MMHG

## 2024-01-01 DIAGNOSIS — G92.8 OTHER TOXIC ENCEPHALOPATHY: ICD-10-CM

## 2024-01-01 DIAGNOSIS — Z95.0 PRESENCE OF CARDIAC PACEMAKER: Chronic | ICD-10-CM

## 2024-01-01 DIAGNOSIS — N17.9 ACUTE KIDNEY FAILURE, UNSPECIFIED: ICD-10-CM

## 2024-01-01 DIAGNOSIS — L89.90 PRESSURE ULCER OF UNSPECIFIED SITE, UNSPECIFIED STAGE: ICD-10-CM

## 2024-01-01 DIAGNOSIS — G93.1 ANOXIC BRAIN DAMAGE, NOT ELSEWHERE CLASSIFIED: ICD-10-CM

## 2024-01-01 DIAGNOSIS — J96.90 RESPIRATORY FAILURE, UNSPECIFIED, UNSPECIFIED WHETHER WITH HYPOXIA OR HYPERCAPNIA: ICD-10-CM

## 2024-01-01 DIAGNOSIS — I10 ESSENTIAL (PRIMARY) HYPERTENSION: ICD-10-CM

## 2024-01-01 DIAGNOSIS — R41.82 ALTERED MENTAL STATUS, UNSPECIFIED: ICD-10-CM

## 2024-01-01 DIAGNOSIS — E11.65 TYPE 2 DIABETES MELLITUS WITH HYPERGLYCEMIA: ICD-10-CM

## 2024-01-01 DIAGNOSIS — G93.49 OTHER ENCEPHALOPATHY: ICD-10-CM

## 2024-01-01 DIAGNOSIS — I25.10 ATHEROSCLEROTIC HEART DISEASE OF NATIVE CORONARY ARTERY WITHOUT ANGINA PECTORIS: ICD-10-CM

## 2024-01-01 DIAGNOSIS — Z98.89 OTHER SPECIFIED POSTPROCEDURAL STATES: Chronic | ICD-10-CM

## 2024-01-01 DIAGNOSIS — R53.2 FUNCTIONAL QUADRIPLEGIA: ICD-10-CM

## 2024-01-01 DIAGNOSIS — R06.00 DYSPNEA, UNSPECIFIED: ICD-10-CM

## 2024-01-01 DIAGNOSIS — Z51.5 ENCOUNTER FOR PALLIATIVE CARE: ICD-10-CM

## 2024-01-01 DIAGNOSIS — Z95.5 PRESENCE OF CORONARY ANGIOPLASTY IMPLANT AND GRAFT: Chronic | ICD-10-CM

## 2024-01-01 DIAGNOSIS — I46.9 CARDIAC ARREST, CAUSE UNSPECIFIED: ICD-10-CM

## 2024-01-01 DIAGNOSIS — R79.89 OTHER SPECIFIED ABNORMAL FINDINGS OF BLOOD CHEMISTRY: ICD-10-CM

## 2024-01-01 DIAGNOSIS — E43 UNSPECIFIED SEVERE PROTEIN-CALORIE MALNUTRITION: ICD-10-CM

## 2024-01-01 DIAGNOSIS — N30.00 ACUTE CYSTITIS WITHOUT HEMATURIA: ICD-10-CM

## 2024-01-01 DIAGNOSIS — R53.81 OTHER MALAISE: ICD-10-CM

## 2024-01-01 DIAGNOSIS — Z71.89 OTHER SPECIFIED COUNSELING: ICD-10-CM

## 2024-01-01 DIAGNOSIS — R68.89 OTHER GENERAL SYMPTOMS AND SIGNS: ICD-10-CM

## 2024-01-01 LAB
-  AZTREONAM: SIGNIFICANT CHANGE UP
-  CEFEPIME: SIGNIFICANT CHANGE UP
-  CEFTAZIDIME: SIGNIFICANT CHANGE UP
-  CIPROFLOXACIN: SIGNIFICANT CHANGE UP
-  IMIPENEM: SIGNIFICANT CHANGE UP
-  LEVOFLOXACIN: SIGNIFICANT CHANGE UP
-  MEROPENEM: SIGNIFICANT CHANGE UP
-  MINOCYCLINE: SIGNIFICANT CHANGE UP
-  PIPERACILLIN/TAZOBACTAM: SIGNIFICANT CHANGE UP
-  TRIMETHOPRIM/SULFAMETHOXAZOLE: SIGNIFICANT CHANGE UP
A1C WITH ESTIMATED AVERAGE GLUCOSE RESULT: 6.4 % — HIGH (ref 4–5.6)
ABO RH CONFIRMATION: SIGNIFICANT CHANGE UP
ALBUMIN SERPL ELPH-MCNC: 1.2 G/DL — LOW (ref 3.3–5)
ALBUMIN SERPL ELPH-MCNC: 1.2 G/DL — LOW (ref 3.3–5)
ALBUMIN SERPL ELPH-MCNC: 1.3 G/DL — LOW (ref 3.3–5)
ALBUMIN SERPL ELPH-MCNC: 1.4 G/DL — LOW (ref 3.3–5)
ALBUMIN SERPL ELPH-MCNC: 1.6 G/DL — LOW (ref 3.3–5)
ALBUMIN SERPL ELPH-MCNC: 1.8 G/DL — LOW (ref 3.3–5)
ALBUMIN SERPL ELPH-MCNC: 1.9 G/DL — LOW (ref 3.3–5)
ALBUMIN SERPL ELPH-MCNC: 2 G/DL — LOW (ref 3.3–5)
ALBUMIN SERPL ELPH-MCNC: 2.1 G/DL — LOW (ref 3.3–5)
ALBUMIN SERPL ELPH-MCNC: 2.1 G/DL — LOW (ref 3.3–5)
ALBUMIN SERPL ELPH-MCNC: 2.9 G/DL — LOW (ref 3.3–5)
ALBUMIN SERPL ELPH-MCNC: 2.9 G/DL — LOW (ref 3.3–5)
ALP SERPL-CCNC: 104 U/L — SIGNIFICANT CHANGE UP (ref 40–120)
ALP SERPL-CCNC: 130 U/L — HIGH (ref 40–120)
ALP SERPL-CCNC: 153 U/L — HIGH (ref 40–120)
ALP SERPL-CCNC: 49 U/L — SIGNIFICANT CHANGE UP (ref 40–120)
ALP SERPL-CCNC: 50 U/L — SIGNIFICANT CHANGE UP (ref 40–120)
ALP SERPL-CCNC: 54 U/L — SIGNIFICANT CHANGE UP (ref 40–120)
ALP SERPL-CCNC: 57 U/L — SIGNIFICANT CHANGE UP (ref 40–120)
ALP SERPL-CCNC: 62 U/L — SIGNIFICANT CHANGE UP (ref 40–120)
ALP SERPL-CCNC: 63 U/L — SIGNIFICANT CHANGE UP (ref 40–120)
ALP SERPL-CCNC: 64 U/L — SIGNIFICANT CHANGE UP (ref 40–120)
ALP SERPL-CCNC: 68 U/L — SIGNIFICANT CHANGE UP (ref 40–120)
ALP SERPL-CCNC: 69 U/L — SIGNIFICANT CHANGE UP (ref 40–120)
ALP SERPL-CCNC: 73 U/L — SIGNIFICANT CHANGE UP (ref 40–120)
ALP SERPL-CCNC: 73 U/L — SIGNIFICANT CHANGE UP (ref 40–120)
ALP SERPL-CCNC: 75 U/L — SIGNIFICANT CHANGE UP (ref 40–120)
ALP SERPL-CCNC: 79 U/L — SIGNIFICANT CHANGE UP (ref 40–120)
ALP SERPL-CCNC: 79 U/L — SIGNIFICANT CHANGE UP (ref 40–120)
ALP SERPL-CCNC: 81 U/L — SIGNIFICANT CHANGE UP (ref 40–120)
ALP SERPL-CCNC: 81 U/L — SIGNIFICANT CHANGE UP (ref 40–120)
ALP SERPL-CCNC: 84 U/L — SIGNIFICANT CHANGE UP (ref 40–120)
ALT FLD-CCNC: 10 U/L — SIGNIFICANT CHANGE UP (ref 4–41)
ALT FLD-CCNC: 13 U/L — SIGNIFICANT CHANGE UP (ref 12–78)
ALT FLD-CCNC: 15 U/L — SIGNIFICANT CHANGE UP (ref 12–78)
ALT FLD-CCNC: 15 U/L — SIGNIFICANT CHANGE UP (ref 4–41)
ALT FLD-CCNC: 15 U/L — SIGNIFICANT CHANGE UP (ref 4–41)
ALT FLD-CCNC: 16 U/L — SIGNIFICANT CHANGE UP (ref 12–78)
ALT FLD-CCNC: 16 U/L — SIGNIFICANT CHANGE UP (ref 4–41)
ALT FLD-CCNC: 16 U/L — SIGNIFICANT CHANGE UP (ref 4–41)
ALT FLD-CCNC: 17 U/L — SIGNIFICANT CHANGE UP (ref 12–78)
ALT FLD-CCNC: 19 U/L — SIGNIFICANT CHANGE UP (ref 12–78)
ALT FLD-CCNC: 29 U/L — SIGNIFICANT CHANGE UP (ref 12–78)
ALT FLD-CCNC: 48 U/L — SIGNIFICANT CHANGE UP (ref 12–78)
ALT FLD-CCNC: 7 U/L — SIGNIFICANT CHANGE UP (ref 4–41)
ALT FLD-CCNC: 9 U/L — SIGNIFICANT CHANGE UP (ref 4–41)
ALT FLD-CCNC: 9 U/L — SIGNIFICANT CHANGE UP (ref 4–41)
AMMONIA BLD-MCNC: 43 UMOL/L — HIGH (ref 11–32)
AMORPH CRY # UR COMP ASSIST: PRESENT
ANION GAP SERPL CALC-SCNC: 10 MMOL/L — SIGNIFICANT CHANGE UP (ref 5–17)
ANION GAP SERPL CALC-SCNC: 10 MMOL/L — SIGNIFICANT CHANGE UP (ref 7–14)
ANION GAP SERPL CALC-SCNC: 11 MMOL/L — SIGNIFICANT CHANGE UP (ref 5–17)
ANION GAP SERPL CALC-SCNC: 11 MMOL/L — SIGNIFICANT CHANGE UP (ref 5–17)
ANION GAP SERPL CALC-SCNC: 11 MMOL/L — SIGNIFICANT CHANGE UP (ref 7–14)
ANION GAP SERPL CALC-SCNC: 14 MMOL/L — SIGNIFICANT CHANGE UP (ref 5–17)
ANION GAP SERPL CALC-SCNC: 14 MMOL/L — SIGNIFICANT CHANGE UP (ref 7–14)
ANION GAP SERPL CALC-SCNC: 14 MMOL/L — SIGNIFICANT CHANGE UP (ref 7–14)
ANION GAP SERPL CALC-SCNC: 15 MMOL/L — HIGH (ref 7–14)
ANION GAP SERPL CALC-SCNC: 15 MMOL/L — HIGH (ref 7–14)
ANION GAP SERPL CALC-SCNC: 16 MMOL/L — HIGH (ref 7–14)
ANION GAP SERPL CALC-SCNC: 16 MMOL/L — HIGH (ref 7–14)
ANION GAP SERPL CALC-SCNC: 17 MMOL/L — HIGH (ref 7–14)
ANION GAP SERPL CALC-SCNC: 20 MMOL/L — HIGH (ref 7–14)
ANION GAP SERPL CALC-SCNC: 20 MMOL/L — HIGH (ref 7–14)
ANION GAP SERPL CALC-SCNC: 4 MMOL/L — LOW (ref 5–17)
ANION GAP SERPL CALC-SCNC: 6 MMOL/L — SIGNIFICANT CHANGE UP (ref 5–17)
ANION GAP SERPL CALC-SCNC: 6 MMOL/L — SIGNIFICANT CHANGE UP (ref 5–17)
ANION GAP SERPL CALC-SCNC: 7 MMOL/L — SIGNIFICANT CHANGE UP (ref 5–17)
ANION GAP SERPL CALC-SCNC: 7 MMOL/L — SIGNIFICANT CHANGE UP (ref 5–17)
ANION GAP SERPL CALC-SCNC: 9 MMOL/L — SIGNIFICANT CHANGE UP (ref 5–17)
ANION GAP SERPL CALC-SCNC: 9 MMOL/L — SIGNIFICANT CHANGE UP (ref 7–14)
ANISOCYTOSIS BLD QL: SLIGHT — SIGNIFICANT CHANGE UP
APPEARANCE UR: ABNORMAL
APPEARANCE UR: ABNORMAL
APTT BLD: 24 SEC — LOW (ref 24.5–35.6)
APTT BLD: 28.8 SEC — SIGNIFICANT CHANGE UP (ref 24.5–35.6)
APTT BLD: 29.5 SEC — SIGNIFICANT CHANGE UP (ref 24.5–35.6)
APTT BLD: 31.6 SEC — SIGNIFICANT CHANGE UP (ref 24.5–35.6)
APTT BLD: 32.1 SEC — SIGNIFICANT CHANGE UP (ref 24.5–35.6)
AST SERPL-CCNC: 22 U/L — SIGNIFICANT CHANGE UP (ref 4–40)
AST SERPL-CCNC: 23 U/L — SIGNIFICANT CHANGE UP (ref 15–37)
AST SERPL-CCNC: 23 U/L — SIGNIFICANT CHANGE UP (ref 4–40)
AST SERPL-CCNC: 23 U/L — SIGNIFICANT CHANGE UP (ref 4–40)
AST SERPL-CCNC: 24 U/L — SIGNIFICANT CHANGE UP (ref 15–37)
AST SERPL-CCNC: 24 U/L — SIGNIFICANT CHANGE UP (ref 4–40)
AST SERPL-CCNC: 26 U/L — SIGNIFICANT CHANGE UP (ref 15–37)
AST SERPL-CCNC: 27 U/L — SIGNIFICANT CHANGE UP (ref 4–40)
AST SERPL-CCNC: 28 U/L — SIGNIFICANT CHANGE UP (ref 15–37)
AST SERPL-CCNC: 30 U/L — SIGNIFICANT CHANGE UP (ref 15–37)
AST SERPL-CCNC: 32 U/L — SIGNIFICANT CHANGE UP (ref 4–40)
AST SERPL-CCNC: 32 U/L — SIGNIFICANT CHANGE UP (ref 4–40)
AST SERPL-CCNC: 33 U/L — SIGNIFICANT CHANGE UP (ref 15–37)
AST SERPL-CCNC: 33 U/L — SIGNIFICANT CHANGE UP (ref 15–37)
AST SERPL-CCNC: 36 U/L — SIGNIFICANT CHANGE UP (ref 4–40)
AST SERPL-CCNC: 38 U/L — HIGH (ref 15–37)
AST SERPL-CCNC: 39 U/L — HIGH (ref 15–37)
AST SERPL-CCNC: 41 U/L — HIGH (ref 15–37)
AST SERPL-CCNC: 46 U/L — HIGH (ref 15–37)
AST SERPL-CCNC: 77 U/L — HIGH (ref 15–37)
BACTERIA # UR AUTO: ABNORMAL /HPF
BACTERIA # UR AUTO: ABNORMAL /HPF
BASE EXCESS BLDA CALC-SCNC: -2.3 MMOL/L — LOW (ref -2–3)
BASE EXCESS BLDA CALC-SCNC: 1.3 MMOL/L — SIGNIFICANT CHANGE UP (ref -2–3)
BASOPHILS # BLD AUTO: 0 K/UL — SIGNIFICANT CHANGE UP (ref 0–0.2)
BASOPHILS # BLD AUTO: 0 K/UL — SIGNIFICANT CHANGE UP (ref 0–0.2)
BASOPHILS # BLD AUTO: 0.02 K/UL — SIGNIFICANT CHANGE UP (ref 0–0.2)
BASOPHILS # BLD AUTO: 0.03 K/UL — SIGNIFICANT CHANGE UP (ref 0–0.2)
BASOPHILS # BLD AUTO: 0.04 K/UL — SIGNIFICANT CHANGE UP (ref 0–0.2)
BASOPHILS # BLD AUTO: 0.07 K/UL — SIGNIFICANT CHANGE UP (ref 0–0.2)
BASOPHILS # BLD AUTO: 0.07 K/UL — SIGNIFICANT CHANGE UP (ref 0–0.2)
BASOPHILS # BLD AUTO: 0.08 K/UL — SIGNIFICANT CHANGE UP (ref 0–0.2)
BASOPHILS # BLD AUTO: 0.11 K/UL — SIGNIFICANT CHANGE UP (ref 0–0.2)
BASOPHILS NFR BLD AUTO: 0 % — SIGNIFICANT CHANGE UP (ref 0–2)
BASOPHILS NFR BLD AUTO: 0 % — SIGNIFICANT CHANGE UP (ref 0–2)
BASOPHILS NFR BLD AUTO: 0.2 % — SIGNIFICANT CHANGE UP (ref 0–2)
BASOPHILS NFR BLD AUTO: 0.3 % — SIGNIFICANT CHANGE UP (ref 0–2)
BASOPHILS NFR BLD AUTO: 0.4 % — SIGNIFICANT CHANGE UP (ref 0–2)
BASOPHILS NFR BLD AUTO: 0.5 % — SIGNIFICANT CHANGE UP (ref 0–2)
BASOPHILS NFR BLD AUTO: 0.6 % — SIGNIFICANT CHANGE UP (ref 0–2)
BASOPHILS NFR BLD AUTO: 0.7 % — SIGNIFICANT CHANGE UP (ref 0–2)
BASOPHILS NFR BLD AUTO: 0.7 % — SIGNIFICANT CHANGE UP (ref 0–2)
BASOPHILS NFR BLD AUTO: 0.8 % — SIGNIFICANT CHANGE UP (ref 0–2)
BILIRUB DIRECT SERPL-MCNC: 0.3 MG/DL — SIGNIFICANT CHANGE UP (ref 0–0.3)
BILIRUB DIRECT SERPL-MCNC: 0.3 MG/DL — SIGNIFICANT CHANGE UP (ref 0–0.3)
BILIRUB DIRECT SERPL-MCNC: <0.2 MG/DL — SIGNIFICANT CHANGE UP (ref 0–0.3)
BILIRUB DIRECT SERPL-MCNC: <0.2 MG/DL — SIGNIFICANT CHANGE UP (ref 0–0.3)
BILIRUB INDIRECT FLD-MCNC: 0.4 MG/DL — SIGNIFICANT CHANGE UP (ref 0–1)
BILIRUB INDIRECT FLD-MCNC: 0.4 MG/DL — SIGNIFICANT CHANGE UP (ref 0–1)
BILIRUB INDIRECT FLD-MCNC: SIGNIFICANT CHANGE UP MG/DL (ref 0–1)
BILIRUB INDIRECT FLD-MCNC: SIGNIFICANT CHANGE UP MG/DL (ref 0–1)
BILIRUB SERPL-MCNC: 0.2 MG/DL — SIGNIFICANT CHANGE UP (ref 0.2–1.2)
BILIRUB SERPL-MCNC: 0.3 MG/DL — SIGNIFICANT CHANGE UP (ref 0.2–1.2)
BILIRUB SERPL-MCNC: 0.4 MG/DL — SIGNIFICANT CHANGE UP (ref 0.2–1.2)
BILIRUB SERPL-MCNC: 0.6 MG/DL — SIGNIFICANT CHANGE UP (ref 0.2–1.2)
BILIRUB SERPL-MCNC: 0.7 MG/DL — SIGNIFICANT CHANGE UP (ref 0.2–1.2)
BILIRUB SERPL-MCNC: 0.7 MG/DL — SIGNIFICANT CHANGE UP (ref 0.2–1.2)
BILIRUB SERPL-MCNC: <0.2 MG/DL — SIGNIFICANT CHANGE UP (ref 0.2–1.2)
BILIRUB UR-MCNC: NEGATIVE — SIGNIFICANT CHANGE UP
BILIRUB UR-MCNC: NEGATIVE — SIGNIFICANT CHANGE UP
BLD GP AB SCN SERPL QL: NEGATIVE — SIGNIFICANT CHANGE UP
BLD GP AB SCN SERPL QL: SIGNIFICANT CHANGE UP
BLOOD GAS ARTERIAL - LYTES,HGB,ICA,LACT RESULT: SIGNIFICANT CHANGE UP
BLOOD GAS ARTERIAL COMPREHENSIVE RESULT: SIGNIFICANT CHANGE UP
BLOOD GAS COMMENTS ARTERIAL: SIGNIFICANT CHANGE UP
BLOOD GAS COMMENTS ARTERIAL: SIGNIFICANT CHANGE UP
BLOOD GAS VENOUS COMPREHENSIVE RESULT: SIGNIFICANT CHANGE UP
BUN SERPL-MCNC: 104 MG/DL — HIGH (ref 7–23)
BUN SERPL-MCNC: 106 MG/DL — HIGH (ref 7–23)
BUN SERPL-MCNC: 25 MG/DL — HIGH (ref 7–23)
BUN SERPL-MCNC: 32 MG/DL — HIGH (ref 7–23)
BUN SERPL-MCNC: 33 MG/DL — HIGH (ref 7–23)
BUN SERPL-MCNC: 33 MG/DL — HIGH (ref 7–23)
BUN SERPL-MCNC: 34 MG/DL — HIGH (ref 7–23)
BUN SERPL-MCNC: 34 MG/DL — HIGH (ref 7–23)
BUN SERPL-MCNC: 39 MG/DL — HIGH (ref 7–23)
BUN SERPL-MCNC: 43 MG/DL — HIGH (ref 7–23)
BUN SERPL-MCNC: 44 MG/DL — HIGH (ref 7–23)
BUN SERPL-MCNC: 47 MG/DL — HIGH (ref 7–23)
BUN SERPL-MCNC: 50 MG/DL — HIGH (ref 7–23)
BUN SERPL-MCNC: 50 MG/DL — HIGH (ref 7–23)
BUN SERPL-MCNC: 53 MG/DL — HIGH (ref 7–23)
BUN SERPL-MCNC: 61 MG/DL — HIGH (ref 7–23)
BUN SERPL-MCNC: 65 MG/DL — HIGH (ref 7–23)
BUN SERPL-MCNC: 65 MG/DL — HIGH (ref 7–23)
BUN SERPL-MCNC: 67 MG/DL — HIGH (ref 7–23)
BUN SERPL-MCNC: 68 MG/DL — HIGH (ref 7–23)
BUN SERPL-MCNC: 74 MG/DL — HIGH (ref 7–23)
BUN SERPL-MCNC: 85 MG/DL — HIGH (ref 7–23)
BUN SERPL-MCNC: 86 MG/DL — HIGH (ref 7–23)
BUN SERPL-MCNC: 92 MG/DL — HIGH (ref 7–23)
CA-I BLD-SCNC: 1.13 MMOL/L — LOW (ref 1.15–1.29)
CA-I BLD-SCNC: 1.15 MMOL/L — SIGNIFICANT CHANGE UP (ref 1.15–1.29)
CALCIUM SERPL-MCNC: 7.3 MG/DL — LOW (ref 8.4–10.5)
CALCIUM SERPL-MCNC: 7.3 MG/DL — LOW (ref 8.4–10.5)
CALCIUM SERPL-MCNC: 7.6 MG/DL — LOW (ref 8.4–10.5)
CALCIUM SERPL-MCNC: 7.6 MG/DL — LOW (ref 8.4–10.5)
CALCIUM SERPL-MCNC: 7.8 MG/DL — LOW (ref 8.5–10.1)
CALCIUM SERPL-MCNC: 7.9 MG/DL — LOW (ref 8.5–10.1)
CALCIUM SERPL-MCNC: 8 MG/DL — LOW (ref 8.4–10.5)
CALCIUM SERPL-MCNC: 8 MG/DL — LOW (ref 8.4–10.5)
CALCIUM SERPL-MCNC: 8 MG/DL — LOW (ref 8.5–10.1)
CALCIUM SERPL-MCNC: 8 MG/DL — LOW (ref 8.5–10.1)
CALCIUM SERPL-MCNC: 8.1 MG/DL — LOW (ref 8.5–10.1)
CALCIUM SERPL-MCNC: 8.2 MG/DL — LOW (ref 8.4–10.5)
CALCIUM SERPL-MCNC: 8.2 MG/DL — LOW (ref 8.4–10.5)
CALCIUM SERPL-MCNC: 8.2 MG/DL — LOW (ref 8.5–10.1)
CALCIUM SERPL-MCNC: 8.2 MG/DL — LOW (ref 8.5–10.1)
CALCIUM SERPL-MCNC: 8.3 MG/DL — LOW (ref 8.4–10.5)
CALCIUM SERPL-MCNC: 8.3 MG/DL — LOW (ref 8.5–10.1)
CALCIUM SERPL-MCNC: 8.4 MG/DL — LOW (ref 8.5–10.1)
CALCIUM SERPL-MCNC: 8.4 MG/DL — SIGNIFICANT CHANGE UP (ref 8.4–10.5)
CALCIUM SERPL-MCNC: 8.4 MG/DL — SIGNIFICANT CHANGE UP (ref 8.4–10.5)
CALCIUM SERPL-MCNC: 8.7 MG/DL — SIGNIFICANT CHANGE UP (ref 8.5–10.1)
CALCIUM SERPL-MCNC: 8.8 MG/DL — SIGNIFICANT CHANGE UP (ref 8.4–10.5)
CALCIUM SERPL-MCNC: 8.9 MG/DL — SIGNIFICANT CHANGE UP (ref 8.5–10.1)
CALCIUM SERPL-MCNC: 9.1 MG/DL — SIGNIFICANT CHANGE UP (ref 8.5–10.1)
CAST: 4 /LPF — SIGNIFICANT CHANGE UP (ref 0–4)
CHLORIDE SERPL-SCNC: 100 MMOL/L — SIGNIFICANT CHANGE UP (ref 98–107)
CHLORIDE SERPL-SCNC: 103 MMOL/L — SIGNIFICANT CHANGE UP (ref 96–108)
CHLORIDE SERPL-SCNC: 103 MMOL/L — SIGNIFICANT CHANGE UP (ref 98–107)
CHLORIDE SERPL-SCNC: 104 MMOL/L — SIGNIFICANT CHANGE UP (ref 96–108)
CHLORIDE SERPL-SCNC: 105 MMOL/L — SIGNIFICANT CHANGE UP (ref 96–108)
CHLORIDE SERPL-SCNC: 105 MMOL/L — SIGNIFICANT CHANGE UP (ref 98–107)
CHLORIDE SERPL-SCNC: 106 MMOL/L — SIGNIFICANT CHANGE UP (ref 96–108)
CHLORIDE SERPL-SCNC: 107 MMOL/L — SIGNIFICANT CHANGE UP (ref 96–108)
CHLORIDE SERPL-SCNC: 107 MMOL/L — SIGNIFICANT CHANGE UP (ref 98–107)
CHLORIDE SERPL-SCNC: 107 MMOL/L — SIGNIFICANT CHANGE UP (ref 98–107)
CHLORIDE SERPL-SCNC: 108 MMOL/L — HIGH (ref 98–107)
CHLORIDE SERPL-SCNC: 108 MMOL/L — SIGNIFICANT CHANGE UP (ref 96–108)
CHLORIDE SERPL-SCNC: 108 MMOL/L — SIGNIFICANT CHANGE UP (ref 96–108)
CHLORIDE SERPL-SCNC: 109 MMOL/L — HIGH (ref 96–108)
CHLORIDE SERPL-SCNC: 111 MMOL/L — HIGH (ref 96–108)
CHLORIDE SERPL-SCNC: 112 MMOL/L — HIGH (ref 96–108)
CHLORIDE SERPL-SCNC: 113 MMOL/L — HIGH (ref 96–108)
CHLORIDE SERPL-SCNC: 116 MMOL/L — HIGH (ref 96–108)
CHLORIDE SERPL-SCNC: 99 MMOL/L — SIGNIFICANT CHANGE UP (ref 98–107)
CHLORIDE SERPL-SCNC: 99 MMOL/L — SIGNIFICANT CHANGE UP (ref 98–107)
CK MB BLD-MCNC: 0.7 % — SIGNIFICANT CHANGE UP (ref 0–3.5)
CK MB BLD-MCNC: 1 % — SIGNIFICANT CHANGE UP (ref 0–3.5)
CK MB CFR SERPL CALC: 1.3 NG/ML — SIGNIFICANT CHANGE UP (ref 0.5–3.6)
CK MB CFR SERPL CALC: 1.7 NG/ML — SIGNIFICANT CHANGE UP (ref 0.5–3.6)
CK SERPL-CCNC: 170 U/L — SIGNIFICANT CHANGE UP (ref 26–308)
CK SERPL-CCNC: 184 U/L — SIGNIFICANT CHANGE UP (ref 26–308)
CO2 BLDA-SCNC: 27 MMOL/L — HIGH (ref 19–24)
CO2 BLDA-SCNC: 28 MMOL/L — HIGH (ref 19–24)
CO2 SERPL-SCNC: 17 MMOL/L — LOW (ref 22–31)
CO2 SERPL-SCNC: 20 MMOL/L — LOW (ref 22–31)
CO2 SERPL-SCNC: 21 MMOL/L — LOW (ref 22–31)
CO2 SERPL-SCNC: 22 MMOL/L — SIGNIFICANT CHANGE UP (ref 22–31)
CO2 SERPL-SCNC: 24 MMOL/L — SIGNIFICANT CHANGE UP (ref 22–31)
CO2 SERPL-SCNC: 25 MMOL/L — SIGNIFICANT CHANGE UP (ref 22–31)
CO2 SERPL-SCNC: 26 MMOL/L — SIGNIFICANT CHANGE UP (ref 22–31)
CO2 SERPL-SCNC: 27 MMOL/L — SIGNIFICANT CHANGE UP (ref 22–31)
CO2 SERPL-SCNC: 28 MMOL/L — SIGNIFICANT CHANGE UP (ref 22–31)
CO2 SERPL-SCNC: 31 MMOL/L — SIGNIFICANT CHANGE UP (ref 22–31)
CO2 SERPL-SCNC: 32 MMOL/L — HIGH (ref 22–31)
CO2 SERPL-SCNC: 9 MMOL/L — CRITICAL LOW (ref 22–31)
CO2 SERPL-SCNC: 9 MMOL/L — CRITICAL LOW (ref 22–31)
COLOR SPEC: YELLOW — SIGNIFICANT CHANGE UP
COLOR SPEC: YELLOW — SIGNIFICANT CHANGE UP
CREAT SERPL-MCNC: 1.27 MG/DL — SIGNIFICANT CHANGE UP (ref 0.5–1.3)
CREAT SERPL-MCNC: 1.45 MG/DL — HIGH (ref 0.5–1.3)
CREAT SERPL-MCNC: 1.45 MG/DL — HIGH (ref 0.5–1.3)
CREAT SERPL-MCNC: 1.46 MG/DL — HIGH (ref 0.5–1.3)
CREAT SERPL-MCNC: 1.47 MG/DL — HIGH (ref 0.5–1.3)
CREAT SERPL-MCNC: 1.54 MG/DL — HIGH (ref 0.5–1.3)
CREAT SERPL-MCNC: 1.65 MG/DL — HIGH (ref 0.5–1.3)
CREAT SERPL-MCNC: 1.68 MG/DL — HIGH (ref 0.5–1.3)
CREAT SERPL-MCNC: 1.71 MG/DL — HIGH (ref 0.5–1.3)
CREAT SERPL-MCNC: 1.73 MG/DL — HIGH (ref 0.5–1.3)
CREAT SERPL-MCNC: 1.75 MG/DL — HIGH (ref 0.5–1.3)
CREAT SERPL-MCNC: 1.77 MG/DL — HIGH (ref 0.5–1.3)
CREAT SERPL-MCNC: 1.77 MG/DL — HIGH (ref 0.5–1.3)
CREAT SERPL-MCNC: 1.82 MG/DL — HIGH (ref 0.5–1.3)
CREAT SERPL-MCNC: 1.83 MG/DL — HIGH (ref 0.5–1.3)
CREAT SERPL-MCNC: 1.83 MG/DL — HIGH (ref 0.5–1.3)
CREAT SERPL-MCNC: 1.85 MG/DL — HIGH (ref 0.5–1.3)
CREAT SERPL-MCNC: 1.92 MG/DL — HIGH (ref 0.5–1.3)
CREAT SERPL-MCNC: 1.96 MG/DL — HIGH (ref 0.5–1.3)
CREAT SERPL-MCNC: 1.97 MG/DL — HIGH (ref 0.5–1.3)
CREAT SERPL-MCNC: 1.99 MG/DL — HIGH (ref 0.5–1.3)
CREAT SERPL-MCNC: 2.14 MG/DL — HIGH (ref 0.5–1.3)
CREAT SERPL-MCNC: 2.18 MG/DL — HIGH (ref 0.5–1.3)
CREAT SERPL-MCNC: 2.22 MG/DL — HIGH (ref 0.5–1.3)
CREAT SERPL-MCNC: 2.41 MG/DL — HIGH (ref 0.5–1.3)
CREAT SERPL-MCNC: 2.65 MG/DL — HIGH (ref 0.5–1.3)
CULTURE RESULTS: ABNORMAL
CULTURE RESULTS: SIGNIFICANT CHANGE UP
DIFF PNL FLD: ABNORMAL
DIFF PNL FLD: ABNORMAL
EGFR: 22 ML/MIN/1.73M2 — LOW
EGFR: 25 ML/MIN/1.73M2 — LOW
EGFR: 27 ML/MIN/1.73M2 — LOW
EGFR: 28 ML/MIN/1.73M2 — LOW
EGFR: 29 ML/MIN/1.73M2 — LOW
EGFR: 31 ML/MIN/1.73M2 — LOW
EGFR: 32 ML/MIN/1.73M2 — LOW
EGFR: 32 ML/MIN/1.73M2 — LOW
EGFR: 33 ML/MIN/1.73M2 — LOW
EGFR: 34 ML/MIN/1.73M2 — LOW
EGFR: 35 ML/MIN/1.73M2 — LOW
EGFR: 36 ML/MIN/1.73M2 — LOW
EGFR: 36 ML/MIN/1.73M2 — LOW
EGFR: 37 ML/MIN/1.73M2 — LOW
EGFR: 37 ML/MIN/1.73M2 — LOW
EGFR: 38 ML/MIN/1.73M2 — LOW
EGFR: 38 ML/MIN/1.73M2 — LOW
EGFR: 39 ML/MIN/1.73M2 — LOW
EGFR: 43 ML/MIN/1.73M2 — LOW
EGFR: 45 ML/MIN/1.73M2 — LOW
EGFR: 45 ML/MIN/1.73M2 — LOW
EGFR: 46 ML/MIN/1.73M2 — LOW
EGFR: 46 ML/MIN/1.73M2 — LOW
EGFR: 54 ML/MIN/1.73M2 — LOW
EOSINOPHIL # BLD AUTO: 0.14 K/UL — SIGNIFICANT CHANGE UP (ref 0–0.5)
EOSINOPHIL # BLD AUTO: 0.23 K/UL — SIGNIFICANT CHANGE UP (ref 0–0.5)
EOSINOPHIL # BLD AUTO: 0.24 K/UL — SIGNIFICANT CHANGE UP (ref 0–0.5)
EOSINOPHIL # BLD AUTO: 0.32 K/UL — SIGNIFICANT CHANGE UP (ref 0–0.5)
EOSINOPHIL # BLD AUTO: 0.34 K/UL — SIGNIFICANT CHANGE UP (ref 0–0.5)
EOSINOPHIL # BLD AUTO: 0.35 K/UL — SIGNIFICANT CHANGE UP (ref 0–0.5)
EOSINOPHIL # BLD AUTO: 0.35 K/UL — SIGNIFICANT CHANGE UP (ref 0–0.5)
EOSINOPHIL # BLD AUTO: 0.39 K/UL — SIGNIFICANT CHANGE UP (ref 0–0.5)
EOSINOPHIL # BLD AUTO: 0.42 K/UL — SIGNIFICANT CHANGE UP (ref 0–0.5)
EOSINOPHIL # BLD AUTO: 0.43 K/UL — SIGNIFICANT CHANGE UP (ref 0–0.5)
EOSINOPHIL # BLD AUTO: 0.46 K/UL — SIGNIFICANT CHANGE UP (ref 0–0.5)
EOSINOPHIL # BLD AUTO: 0.49 K/UL — SIGNIFICANT CHANGE UP (ref 0–0.5)
EOSINOPHIL # BLD AUTO: 0.51 K/UL — HIGH (ref 0–0.5)
EOSINOPHIL # BLD AUTO: 0.54 K/UL — HIGH (ref 0–0.5)
EOSINOPHIL NFR BLD AUTO: 1.9 % — SIGNIFICANT CHANGE UP (ref 0–6)
EOSINOPHIL NFR BLD AUTO: 2.1 % — SIGNIFICANT CHANGE UP (ref 0–6)
EOSINOPHIL NFR BLD AUTO: 2.3 % — SIGNIFICANT CHANGE UP (ref 0–6)
EOSINOPHIL NFR BLD AUTO: 2.6 % — SIGNIFICANT CHANGE UP (ref 0–6)
EOSINOPHIL NFR BLD AUTO: 2.7 % — SIGNIFICANT CHANGE UP (ref 0–6)
EOSINOPHIL NFR BLD AUTO: 3 % — SIGNIFICANT CHANGE UP (ref 0–6)
EOSINOPHIL NFR BLD AUTO: 3.1 % — SIGNIFICANT CHANGE UP (ref 0–6)
EOSINOPHIL NFR BLD AUTO: 3.6 % — SIGNIFICANT CHANGE UP (ref 0–6)
EOSINOPHIL NFR BLD AUTO: 3.7 % — SIGNIFICANT CHANGE UP (ref 0–6)
EOSINOPHIL NFR BLD AUTO: 5 % — SIGNIFICANT CHANGE UP (ref 0–6)
EOSINOPHIL NFR BLD AUTO: 5.3 % — SIGNIFICANT CHANGE UP (ref 0–6)
EOSINOPHIL NFR BLD AUTO: 5.6 % — SIGNIFICANT CHANGE UP (ref 0–6)
EOSINOPHIL NFR BLD AUTO: 5.9 % — SIGNIFICANT CHANGE UP (ref 0–6)
EOSINOPHIL NFR BLD AUTO: 6.1 % — HIGH (ref 0–6)
EPI CELLS # UR: SIGNIFICANT CHANGE UP
ESTIMATED AVERAGE GLUCOSE: 137 — SIGNIFICANT CHANGE UP
FERRITIN SERPL-MCNC: 609 NG/ML — HIGH (ref 30–400)
FINE GRAN CASTS #/AREA URNS AUTO: PRESENT
FLUAV AG NPH QL: SIGNIFICANT CHANGE UP
FLUBV AG NPH QL: SIGNIFICANT CHANGE UP
FOLATE SERPL-MCNC: 13.6 NG/ML — SIGNIFICANT CHANGE UP
GAS PNL BLDA: SIGNIFICANT CHANGE UP
GAS PNL BLDA: SIGNIFICANT CHANGE UP
GIANT PLATELETS BLD QL SMEAR: PRESENT — SIGNIFICANT CHANGE UP
GLUCOSE BLDC GLUCOMTR-MCNC: 108 MG/DL — HIGH (ref 70–99)
GLUCOSE BLDC GLUCOMTR-MCNC: 112 MG/DL — HIGH (ref 70–99)
GLUCOSE BLDC GLUCOMTR-MCNC: 112 MG/DL — HIGH (ref 70–99)
GLUCOSE BLDC GLUCOMTR-MCNC: 114 MG/DL — HIGH (ref 70–99)
GLUCOSE BLDC GLUCOMTR-MCNC: 120 MG/DL — HIGH (ref 70–99)
GLUCOSE BLDC GLUCOMTR-MCNC: 124 MG/DL — HIGH (ref 70–99)
GLUCOSE BLDC GLUCOMTR-MCNC: 124 MG/DL — HIGH (ref 70–99)
GLUCOSE BLDC GLUCOMTR-MCNC: 127 MG/DL — HIGH (ref 70–99)
GLUCOSE BLDC GLUCOMTR-MCNC: 128 MG/DL — HIGH (ref 70–99)
GLUCOSE BLDC GLUCOMTR-MCNC: 128 MG/DL — HIGH (ref 70–99)
GLUCOSE BLDC GLUCOMTR-MCNC: 130 MG/DL — HIGH (ref 70–99)
GLUCOSE BLDC GLUCOMTR-MCNC: 131 MG/DL — HIGH (ref 70–99)
GLUCOSE BLDC GLUCOMTR-MCNC: 135 MG/DL — HIGH (ref 70–99)
GLUCOSE BLDC GLUCOMTR-MCNC: 137 MG/DL — HIGH (ref 70–99)
GLUCOSE BLDC GLUCOMTR-MCNC: 144 MG/DL — HIGH (ref 70–99)
GLUCOSE BLDC GLUCOMTR-MCNC: 145 MG/DL — HIGH (ref 70–99)
GLUCOSE BLDC GLUCOMTR-MCNC: 146 MG/DL — HIGH (ref 70–99)
GLUCOSE BLDC GLUCOMTR-MCNC: 147 MG/DL — HIGH (ref 70–99)
GLUCOSE BLDC GLUCOMTR-MCNC: 149 MG/DL — HIGH (ref 70–99)
GLUCOSE BLDC GLUCOMTR-MCNC: 149 MG/DL — HIGH (ref 70–99)
GLUCOSE BLDC GLUCOMTR-MCNC: 153 MG/DL — HIGH (ref 70–99)
GLUCOSE BLDC GLUCOMTR-MCNC: 155 MG/DL — HIGH (ref 70–99)
GLUCOSE BLDC GLUCOMTR-MCNC: 157 MG/DL — HIGH (ref 70–99)
GLUCOSE BLDC GLUCOMTR-MCNC: 158 MG/DL — HIGH (ref 70–99)
GLUCOSE BLDC GLUCOMTR-MCNC: 159 MG/DL — HIGH (ref 70–99)
GLUCOSE BLDC GLUCOMTR-MCNC: 161 MG/DL — HIGH (ref 70–99)
GLUCOSE BLDC GLUCOMTR-MCNC: 165 MG/DL — HIGH (ref 70–99)
GLUCOSE BLDC GLUCOMTR-MCNC: 165 MG/DL — HIGH (ref 70–99)
GLUCOSE BLDC GLUCOMTR-MCNC: 169 MG/DL — HIGH (ref 70–99)
GLUCOSE BLDC GLUCOMTR-MCNC: 172 MG/DL — HIGH (ref 70–99)
GLUCOSE BLDC GLUCOMTR-MCNC: 173 MG/DL — HIGH (ref 70–99)
GLUCOSE BLDC GLUCOMTR-MCNC: 173 MG/DL — HIGH (ref 70–99)
GLUCOSE BLDC GLUCOMTR-MCNC: 174 MG/DL — HIGH (ref 70–99)
GLUCOSE BLDC GLUCOMTR-MCNC: 174 MG/DL — HIGH (ref 70–99)
GLUCOSE BLDC GLUCOMTR-MCNC: 176 MG/DL — HIGH (ref 70–99)
GLUCOSE BLDC GLUCOMTR-MCNC: 179 MG/DL — HIGH (ref 70–99)
GLUCOSE BLDC GLUCOMTR-MCNC: 180 MG/DL — HIGH (ref 70–99)
GLUCOSE BLDC GLUCOMTR-MCNC: 180 MG/DL — HIGH (ref 70–99)
GLUCOSE BLDC GLUCOMTR-MCNC: 184 MG/DL — HIGH (ref 70–99)
GLUCOSE BLDC GLUCOMTR-MCNC: 187 MG/DL — HIGH (ref 70–99)
GLUCOSE BLDC GLUCOMTR-MCNC: 199 MG/DL — HIGH (ref 70–99)
GLUCOSE BLDC GLUCOMTR-MCNC: 200 MG/DL — HIGH (ref 70–99)
GLUCOSE BLDC GLUCOMTR-MCNC: 201 MG/DL — HIGH (ref 70–99)
GLUCOSE BLDC GLUCOMTR-MCNC: 204 MG/DL — HIGH (ref 70–99)
GLUCOSE BLDC GLUCOMTR-MCNC: 204 MG/DL — HIGH (ref 70–99)
GLUCOSE BLDC GLUCOMTR-MCNC: 208 MG/DL — HIGH (ref 70–99)
GLUCOSE BLDC GLUCOMTR-MCNC: 208 MG/DL — HIGH (ref 70–99)
GLUCOSE BLDC GLUCOMTR-MCNC: 211 MG/DL — HIGH (ref 70–99)
GLUCOSE BLDC GLUCOMTR-MCNC: 211 MG/DL — HIGH (ref 70–99)
GLUCOSE BLDC GLUCOMTR-MCNC: 215 MG/DL — HIGH (ref 70–99)
GLUCOSE BLDC GLUCOMTR-MCNC: 216 MG/DL — HIGH (ref 70–99)
GLUCOSE BLDC GLUCOMTR-MCNC: 217 MG/DL — HIGH (ref 70–99)
GLUCOSE BLDC GLUCOMTR-MCNC: 220 MG/DL — HIGH (ref 70–99)
GLUCOSE BLDC GLUCOMTR-MCNC: 225 MG/DL — HIGH (ref 70–99)
GLUCOSE BLDC GLUCOMTR-MCNC: 228 MG/DL — HIGH (ref 70–99)
GLUCOSE BLDC GLUCOMTR-MCNC: 230 MG/DL — HIGH (ref 70–99)
GLUCOSE BLDC GLUCOMTR-MCNC: 230 MG/DL — HIGH (ref 70–99)
GLUCOSE BLDC GLUCOMTR-MCNC: 235 MG/DL — HIGH (ref 70–99)
GLUCOSE BLDC GLUCOMTR-MCNC: 236 MG/DL — HIGH (ref 70–99)
GLUCOSE BLDC GLUCOMTR-MCNC: 236 MG/DL — HIGH (ref 70–99)
GLUCOSE BLDC GLUCOMTR-MCNC: 241 MG/DL — HIGH (ref 70–99)
GLUCOSE BLDC GLUCOMTR-MCNC: 242 MG/DL — HIGH (ref 70–99)
GLUCOSE BLDC GLUCOMTR-MCNC: 248 MG/DL — HIGH (ref 70–99)
GLUCOSE BLDC GLUCOMTR-MCNC: 248 MG/DL — HIGH (ref 70–99)
GLUCOSE BLDC GLUCOMTR-MCNC: 250 MG/DL — HIGH (ref 70–99)
GLUCOSE BLDC GLUCOMTR-MCNC: 259 MG/DL — HIGH (ref 70–99)
GLUCOSE BLDC GLUCOMTR-MCNC: 264 MG/DL — HIGH (ref 70–99)
GLUCOSE BLDC GLUCOMTR-MCNC: 264 MG/DL — HIGH (ref 70–99)
GLUCOSE BLDC GLUCOMTR-MCNC: 265 MG/DL — HIGH (ref 70–99)
GLUCOSE BLDC GLUCOMTR-MCNC: 266 MG/DL — HIGH (ref 70–99)
GLUCOSE BLDC GLUCOMTR-MCNC: 274 MG/DL — HIGH (ref 70–99)
GLUCOSE BLDC GLUCOMTR-MCNC: 288 MG/DL — HIGH (ref 70–99)
GLUCOSE BLDC GLUCOMTR-MCNC: 301 MG/DL — HIGH (ref 70–99)
GLUCOSE BLDC GLUCOMTR-MCNC: 341 MG/DL — HIGH (ref 70–99)
GLUCOSE BLDC GLUCOMTR-MCNC: 353 MG/DL — HIGH (ref 70–99)
GLUCOSE BLDC GLUCOMTR-MCNC: 419 MG/DL — HIGH (ref 70–99)
GLUCOSE BLDC GLUCOMTR-MCNC: 437 MG/DL — HIGH (ref 70–99)
GLUCOSE BLDC GLUCOMTR-MCNC: 65 MG/DL — LOW (ref 70–99)
GLUCOSE BLDC GLUCOMTR-MCNC: 66 MG/DL — LOW (ref 70–99)
GLUCOSE BLDC GLUCOMTR-MCNC: 66 MG/DL — LOW (ref 70–99)
GLUCOSE BLDC GLUCOMTR-MCNC: 69 MG/DL — LOW (ref 70–99)
GLUCOSE BLDC GLUCOMTR-MCNC: 77 MG/DL — SIGNIFICANT CHANGE UP (ref 70–99)
GLUCOSE BLDC GLUCOMTR-MCNC: 92 MG/DL — SIGNIFICANT CHANGE UP (ref 70–99)
GLUCOSE BLDC GLUCOMTR-MCNC: 98 MG/DL — SIGNIFICANT CHANGE UP (ref 70–99)
GLUCOSE BLDC GLUCOMTR-MCNC: 99 MG/DL — SIGNIFICANT CHANGE UP (ref 70–99)
GLUCOSE SERPL-MCNC: 108 MG/DL — HIGH (ref 70–99)
GLUCOSE SERPL-MCNC: 110 MG/DL — HIGH (ref 70–99)
GLUCOSE SERPL-MCNC: 122 MG/DL — HIGH (ref 70–99)
GLUCOSE SERPL-MCNC: 127 MG/DL — HIGH (ref 70–99)
GLUCOSE SERPL-MCNC: 135 MG/DL — HIGH (ref 70–99)
GLUCOSE SERPL-MCNC: 137 MG/DL — HIGH (ref 70–99)
GLUCOSE SERPL-MCNC: 141 MG/DL — HIGH (ref 70–99)
GLUCOSE SERPL-MCNC: 157 MG/DL — HIGH (ref 70–99)
GLUCOSE SERPL-MCNC: 180 MG/DL — HIGH (ref 70–99)
GLUCOSE SERPL-MCNC: 181 MG/DL — HIGH (ref 70–99)
GLUCOSE SERPL-MCNC: 186 MG/DL — HIGH (ref 70–99)
GLUCOSE SERPL-MCNC: 209 MG/DL — HIGH (ref 70–99)
GLUCOSE SERPL-MCNC: 212 MG/DL — HIGH (ref 70–99)
GLUCOSE SERPL-MCNC: 219 MG/DL — HIGH (ref 70–99)
GLUCOSE SERPL-MCNC: 224 MG/DL — HIGH (ref 70–99)
GLUCOSE SERPL-MCNC: 228 MG/DL — HIGH (ref 70–99)
GLUCOSE SERPL-MCNC: 246 MG/DL — HIGH (ref 70–99)
GLUCOSE SERPL-MCNC: 246 MG/DL — HIGH (ref 70–99)
GLUCOSE SERPL-MCNC: 251 MG/DL — HIGH (ref 70–99)
GLUCOSE SERPL-MCNC: 251 MG/DL — HIGH (ref 70–99)
GLUCOSE SERPL-MCNC: 261 MG/DL — HIGH (ref 70–99)
GLUCOSE SERPL-MCNC: 297 MG/DL — HIGH (ref 70–99)
GLUCOSE SERPL-MCNC: 317 MG/DL — HIGH (ref 70–99)
GLUCOSE SERPL-MCNC: 418 MG/DL — HIGH (ref 70–99)
GLUCOSE UR QL: NEGATIVE MG/DL — SIGNIFICANT CHANGE UP
GLUCOSE UR QL: NEGATIVE MG/DL — SIGNIFICANT CHANGE UP
GRAM STN FLD: ABNORMAL
HCO3 BLDA-SCNC: 25 MMOL/L — SIGNIFICANT CHANGE UP (ref 21–28)
HCO3 BLDA-SCNC: 27 MMOL/L — SIGNIFICANT CHANGE UP (ref 21–28)
HCT VFR BLD CALC: 15.6 % — CRITICAL LOW (ref 39–50)
HCT VFR BLD CALC: 20.4 % — CRITICAL LOW (ref 39–50)
HCT VFR BLD CALC: 20.9 % — CRITICAL LOW (ref 39–50)
HCT VFR BLD CALC: 20.9 % — CRITICAL LOW (ref 39–50)
HCT VFR BLD CALC: 21.1 % — LOW (ref 39–50)
HCT VFR BLD CALC: 21.3 % — LOW (ref 39–50)
HCT VFR BLD CALC: 21.8 % — LOW (ref 39–50)
HCT VFR BLD CALC: 21.8 % — LOW (ref 39–50)
HCT VFR BLD CALC: 22 % — LOW (ref 39–50)
HCT VFR BLD CALC: 22 % — LOW (ref 39–50)
HCT VFR BLD CALC: 22.1 % — LOW (ref 39–50)
HCT VFR BLD CALC: 22.5 % — LOW (ref 39–50)
HCT VFR BLD CALC: 22.5 % — LOW (ref 39–50)
HCT VFR BLD CALC: 22.7 % — LOW (ref 39–50)
HCT VFR BLD CALC: 22.9 % — LOW (ref 39–50)
HCT VFR BLD CALC: 23.3 % — LOW (ref 39–50)
HCT VFR BLD CALC: 23.3 % — LOW (ref 39–50)
HCT VFR BLD CALC: 23.5 % — LOW (ref 39–50)
HCT VFR BLD CALC: 23.8 % — LOW (ref 39–50)
HCT VFR BLD CALC: 24.5 % — LOW (ref 39–50)
HCT VFR BLD CALC: 24.8 % — LOW (ref 39–50)
HCT VFR BLD CALC: 24.9 % — LOW (ref 39–50)
HCT VFR BLD CALC: 25 % — LOW (ref 39–50)
HCT VFR BLD CALC: 25.3 % — LOW (ref 39–50)
HCT VFR BLD CALC: 25.7 % — LOW (ref 39–50)
HCT VFR BLD CALC: 27.7 % — LOW (ref 39–50)
HCT VFR BLD CALC: 27.9 % — LOW (ref 39–50)
HCT VFR BLD CALC: 30 % — LOW (ref 39–50)
HCT VFR BLD CALC: 41.5 % — SIGNIFICANT CHANGE UP (ref 39–50)
HGB BLD-MCNC: 13.7 G/DL — SIGNIFICANT CHANGE UP (ref 13–17)
HGB BLD-MCNC: 5.2 G/DL — CRITICAL LOW (ref 13–17)
HGB BLD-MCNC: 5.2 G/DL — CRITICAL LOW (ref 13–17)
HGB BLD-MCNC: 5.5 G/DL — CRITICAL LOW (ref 13–17)
HGB BLD-MCNC: 5.5 G/DL — CRITICAL LOW (ref 13–17)
HGB BLD-MCNC: 6.3 G/DL — CRITICAL LOW (ref 13–17)
HGB BLD-MCNC: 6.5 G/DL — CRITICAL LOW (ref 13–17)
HGB BLD-MCNC: 6.5 G/DL — CRITICAL LOW (ref 13–17)
HGB BLD-MCNC: 6.7 G/DL — CRITICAL LOW (ref 13–17)
HGB BLD-MCNC: 6.8 G/DL — CRITICAL LOW (ref 13–17)
HGB BLD-MCNC: 7 G/DL — CRITICAL LOW (ref 13–17)
HGB BLD-MCNC: 7 G/DL — CRITICAL LOW (ref 13–17)
HGB BLD-MCNC: 7.1 G/DL — LOW (ref 13–17)
HGB BLD-MCNC: 7.3 G/DL — LOW (ref 13–17)
HGB BLD-MCNC: 7.4 G/DL — LOW (ref 13–17)
HGB BLD-MCNC: 7.5 G/DL — LOW (ref 13–17)
HGB BLD-MCNC: 7.7 G/DL — LOW (ref 13–17)
HGB BLD-MCNC: 7.7 G/DL — LOW (ref 13–17)
HGB BLD-MCNC: 7.8 G/DL — LOW (ref 13–17)
HGB BLD-MCNC: 7.9 G/DL — LOW (ref 13–17)
HGB BLD-MCNC: 7.9 G/DL — LOW (ref 13–17)
HGB BLD-MCNC: 8.1 G/DL — LOW (ref 13–17)
HGB BLD-MCNC: 8.6 G/DL — LOW (ref 13–17)
HGB BLD-MCNC: 8.9 G/DL — LOW (ref 13–17)
HGB BLD-MCNC: 9.6 G/DL — LOW (ref 13–17)
HOROWITZ INDEX BLDA+IHG-RTO: 100 — SIGNIFICANT CHANGE UP
HOROWITZ INDEX BLDA+IHG-RTO: 100 — SIGNIFICANT CHANGE UP
HYPOCHROMIA BLD QL: SIGNIFICANT CHANGE UP
IANC: 10.18 K/UL — HIGH (ref 1.8–7.4)
IANC: 4.96 K/UL — SIGNIFICANT CHANGE UP (ref 1.8–7.4)
IANC: 5.11 K/UL — SIGNIFICANT CHANGE UP (ref 1.8–7.4)
IANC: 5.22 K/UL — SIGNIFICANT CHANGE UP (ref 1.8–7.4)
IANC: 5.43 K/UL — SIGNIFICANT CHANGE UP (ref 1.8–7.4)
IANC: 5.8 K/UL — SIGNIFICANT CHANGE UP (ref 1.8–7.4)
IANC: 6.06 K/UL — SIGNIFICANT CHANGE UP (ref 1.8–7.4)
IANC: 8.05 K/UL — HIGH (ref 1.8–7.4)
IANC: 8.72 K/UL — HIGH (ref 1.8–7.4)
IMM GRANULOCYTES NFR BLD AUTO: 0.6 % — SIGNIFICANT CHANGE UP (ref 0–0.9)
IMM GRANULOCYTES NFR BLD AUTO: 0.7 % — SIGNIFICANT CHANGE UP (ref 0–0.9)
IMM GRANULOCYTES NFR BLD AUTO: 0.8 % — SIGNIFICANT CHANGE UP (ref 0–0.9)
IMM GRANULOCYTES NFR BLD AUTO: 0.9 % — SIGNIFICANT CHANGE UP (ref 0–0.9)
IMM GRANULOCYTES NFR BLD AUTO: 1.2 % — HIGH (ref 0–0.9)
IMM GRANULOCYTES NFR BLD AUTO: 1.2 % — HIGH (ref 0–0.9)
IMM GRANULOCYTES NFR BLD AUTO: 1.3 % — HIGH (ref 0–0.9)
IMM GRANULOCYTES NFR BLD AUTO: 1.4 % — HIGH (ref 0–0.9)
IMM GRANULOCYTES NFR BLD AUTO: 1.5 % — HIGH (ref 0–0.9)
IMM GRANULOCYTES NFR BLD AUTO: 1.8 % — HIGH (ref 0–0.9)
IMM GRANULOCYTES NFR BLD AUTO: 1.9 % — HIGH (ref 0–0.9)
IMM GRANULOCYTES NFR BLD AUTO: 2.1 % — HIGH (ref 0–0.9)
INR BLD: 1.01 RATIO — SIGNIFICANT CHANGE UP (ref 0.85–1.18)
INR BLD: 1.1 RATIO — SIGNIFICANT CHANGE UP (ref 0.85–1.18)
INR BLD: 1.16 RATIO — SIGNIFICANT CHANGE UP (ref 0.85–1.18)
INR BLD: 1.16 RATIO — SIGNIFICANT CHANGE UP (ref 0.85–1.18)
INR BLD: 1.17 RATIO — SIGNIFICANT CHANGE UP (ref 0.85–1.18)
IRON SATN MFR SERPL: 31 % — SIGNIFICANT CHANGE UP (ref 16–55)
IRON SATN MFR SERPL: 61 UG/DL — SIGNIFICANT CHANGE UP (ref 45–165)
KETONES UR-MCNC: NEGATIVE MG/DL — SIGNIFICANT CHANGE UP
KETONES UR-MCNC: NEGATIVE MG/DL — SIGNIFICANT CHANGE UP
LACTATE BLDV-MCNC: 2.6 MMOL/L — HIGH (ref 0.5–2)
LACTATE BLDV-MCNC: 2.6 MMOL/L — HIGH (ref 0.5–2)
LACTATE SERPL-SCNC: 2.8 MMOL/L — HIGH (ref 0.7–2)
LACTATE SERPL-SCNC: 2.9 MMOL/L — HIGH (ref 0.7–2)
LACTATE SERPL-SCNC: 3.6 MMOL/L — HIGH (ref 0.7–2)
LACTATE SERPL-SCNC: 3.7 MMOL/L — HIGH (ref 0.7–2)
LACTATE SERPL-SCNC: 6.6 MMOL/L — CRITICAL HIGH (ref 0.7–2)
LACTATE SERPL-SCNC: 9 MMOL/L — CRITICAL HIGH (ref 0.5–2)
LACTATE SERPL-SCNC: 9 MMOL/L — CRITICAL HIGH (ref 0.5–2)
LACTATE SERPL-SCNC: 9.1 MMOL/L — CRITICAL HIGH (ref 0.7–2)
LEUKOCYTE ESTERASE UR-ACNC: ABNORMAL
LEUKOCYTE ESTERASE UR-ACNC: ABNORMAL
LYMPHOCYTES # BLD AUTO: 0.66 K/UL — LOW (ref 1–3.3)
LYMPHOCYTES # BLD AUTO: 1.09 K/UL — SIGNIFICANT CHANGE UP (ref 1–3.3)
LYMPHOCYTES # BLD AUTO: 1.2 K/UL — SIGNIFICANT CHANGE UP (ref 1–3.3)
LYMPHOCYTES # BLD AUTO: 1.32 K/UL — SIGNIFICANT CHANGE UP (ref 1–3.3)
LYMPHOCYTES # BLD AUTO: 1.32 K/UL — SIGNIFICANT CHANGE UP (ref 1–3.3)
LYMPHOCYTES # BLD AUTO: 1.39 K/UL — SIGNIFICANT CHANGE UP (ref 1–3.3)
LYMPHOCYTES # BLD AUTO: 1.45 K/UL — SIGNIFICANT CHANGE UP (ref 1–3.3)
LYMPHOCYTES # BLD AUTO: 1.49 K/UL — SIGNIFICANT CHANGE UP (ref 1–3.3)
LYMPHOCYTES # BLD AUTO: 1.52 K/UL — SIGNIFICANT CHANGE UP (ref 1–3.3)
LYMPHOCYTES # BLD AUTO: 1.56 K/UL — SIGNIFICANT CHANGE UP (ref 1–3.3)
LYMPHOCYTES # BLD AUTO: 1.68 K/UL — SIGNIFICANT CHANGE UP (ref 1–3.3)
LYMPHOCYTES # BLD AUTO: 1.83 K/UL — SIGNIFICANT CHANGE UP (ref 1–3.3)
LYMPHOCYTES # BLD AUTO: 10 % — LOW (ref 13–44)
LYMPHOCYTES # BLD AUTO: 10.1 % — LOW (ref 13–44)
LYMPHOCYTES # BLD AUTO: 12.9 % — LOW (ref 13–44)
LYMPHOCYTES # BLD AUTO: 14.9 % — SIGNIFICANT CHANGE UP (ref 13–44)
LYMPHOCYTES # BLD AUTO: 15.3 % — SIGNIFICANT CHANGE UP (ref 13–44)
LYMPHOCYTES # BLD AUTO: 15.5 % — SIGNIFICANT CHANGE UP (ref 13–44)
LYMPHOCYTES # BLD AUTO: 18.7 % — SIGNIFICANT CHANGE UP (ref 13–44)
LYMPHOCYTES # BLD AUTO: 19.7 % — SIGNIFICANT CHANGE UP (ref 13–44)
LYMPHOCYTES # BLD AUTO: 19.9 % — SIGNIFICANT CHANGE UP (ref 13–44)
LYMPHOCYTES # BLD AUTO: 2.06 K/UL — SIGNIFICANT CHANGE UP (ref 1–3.3)
LYMPHOCYTES # BLD AUTO: 2.16 K/UL — SIGNIFICANT CHANGE UP (ref 1–3.3)
LYMPHOCYTES # BLD AUTO: 22.5 % — SIGNIFICANT CHANGE UP (ref 13–44)
LYMPHOCYTES # BLD AUTO: 22.5 % — SIGNIFICANT CHANGE UP (ref 13–44)
LYMPHOCYTES # BLD AUTO: 8.6 % — LOW (ref 13–44)
LYMPHOCYTES # BLD AUTO: 9.5 % — LOW (ref 13–44)
LYMPHOCYTES # BLD AUTO: 9.7 % — LOW (ref 13–44)
MACROCYTES BLD QL: SIGNIFICANT CHANGE UP
MACROCYTES BLD QL: SLIGHT — SIGNIFICANT CHANGE UP
MAGNESIUM SERPL-MCNC: 1.9 MG/DL — SIGNIFICANT CHANGE UP (ref 1.6–2.6)
MAGNESIUM SERPL-MCNC: 1.9 MG/DL — SIGNIFICANT CHANGE UP (ref 1.6–2.6)
MAGNESIUM SERPL-MCNC: 2.1 MG/DL — SIGNIFICANT CHANGE UP (ref 1.6–2.6)
MAGNESIUM SERPL-MCNC: 2.1 MG/DL — SIGNIFICANT CHANGE UP (ref 1.6–2.6)
MAGNESIUM SERPL-MCNC: 2.2 MG/DL — SIGNIFICANT CHANGE UP (ref 1.6–2.6)
MAGNESIUM SERPL-MCNC: 2.2 MG/DL — SIGNIFICANT CHANGE UP (ref 1.6–2.6)
MAGNESIUM SERPL-MCNC: 2.4 MG/DL — SIGNIFICANT CHANGE UP (ref 1.6–2.6)
MAGNESIUM SERPL-MCNC: 2.5 MG/DL — SIGNIFICANT CHANGE UP (ref 1.6–2.6)
MAGNESIUM SERPL-MCNC: 2.6 MG/DL — SIGNIFICANT CHANGE UP (ref 1.6–2.6)
MAGNESIUM SERPL-MCNC: 2.7 MG/DL — HIGH (ref 1.6–2.6)
MAGNESIUM SERPL-MCNC: 2.8 MG/DL — HIGH (ref 1.6–2.6)
MAGNESIUM SERPL-MCNC: 2.8 MG/DL — HIGH (ref 1.6–2.6)
MAGNESIUM SERPL-MCNC: 2.9 MG/DL — HIGH (ref 1.6–2.6)
MANUAL SMEAR VERIFICATION: SIGNIFICANT CHANGE UP
MANUAL SMEAR VERIFICATION: SIGNIFICANT CHANGE UP
MCHC RBC-ENTMCNC: 29.5 PG — SIGNIFICANT CHANGE UP (ref 27–34)
MCHC RBC-ENTMCNC: 29.5 PG — SIGNIFICANT CHANGE UP (ref 27–34)
MCHC RBC-ENTMCNC: 29.8 G/DL — LOW (ref 32–36)
MCHC RBC-ENTMCNC: 29.9 G/DL — LOW (ref 32–36)
MCHC RBC-ENTMCNC: 30.2 PG — SIGNIFICANT CHANGE UP (ref 27–34)
MCHC RBC-ENTMCNC: 30.2 PG — SIGNIFICANT CHANGE UP (ref 27–34)
MCHC RBC-ENTMCNC: 30.3 GM/DL — LOW (ref 32–36)
MCHC RBC-ENTMCNC: 30.4 PG — SIGNIFICANT CHANGE UP (ref 27–34)
MCHC RBC-ENTMCNC: 30.4 PG — SIGNIFICANT CHANGE UP (ref 27–34)
MCHC RBC-ENTMCNC: 30.5 G/DL — LOW (ref 32–36)
MCHC RBC-ENTMCNC: 30.7 PG — SIGNIFICANT CHANGE UP (ref 27–34)
MCHC RBC-ENTMCNC: 30.7 PG — SIGNIFICANT CHANGE UP (ref 27–34)
MCHC RBC-ENTMCNC: 30.8 GM/DL — LOW (ref 32–36)
MCHC RBC-ENTMCNC: 30.9 PG — SIGNIFICANT CHANGE UP (ref 27–34)
MCHC RBC-ENTMCNC: 31 G/DL — LOW (ref 32–36)
MCHC RBC-ENTMCNC: 31 PG — SIGNIFICANT CHANGE UP (ref 27–34)
MCHC RBC-ENTMCNC: 31.1 PG — SIGNIFICANT CHANGE UP (ref 27–34)
MCHC RBC-ENTMCNC: 31.2 G/DL — LOW (ref 32–36)
MCHC RBC-ENTMCNC: 31.2 G/DL — LOW (ref 32–36)
MCHC RBC-ENTMCNC: 31.2 PG — SIGNIFICANT CHANGE UP (ref 27–34)
MCHC RBC-ENTMCNC: 31.3 GM/DL — LOW (ref 32–36)
MCHC RBC-ENTMCNC: 31.3 PG — SIGNIFICANT CHANGE UP (ref 27–34)
MCHC RBC-ENTMCNC: 31.4 G/DL — LOW (ref 32–36)
MCHC RBC-ENTMCNC: 31.5 G/DL — LOW (ref 32–36)
MCHC RBC-ENTMCNC: 31.5 G/DL — LOW (ref 32–36)
MCHC RBC-ENTMCNC: 31.5 PG — SIGNIFICANT CHANGE UP (ref 27–34)
MCHC RBC-ENTMCNC: 31.7 G/DL — LOW (ref 32–36)
MCHC RBC-ENTMCNC: 31.7 PG — SIGNIFICANT CHANGE UP (ref 27–34)
MCHC RBC-ENTMCNC: 31.7 PG — SIGNIFICANT CHANGE UP (ref 27–34)
MCHC RBC-ENTMCNC: 31.8 G/DL — LOW (ref 32–36)
MCHC RBC-ENTMCNC: 31.8 PG — SIGNIFICANT CHANGE UP (ref 27–34)
MCHC RBC-ENTMCNC: 31.8 PG — SIGNIFICANT CHANGE UP (ref 27–34)
MCHC RBC-ENTMCNC: 31.9 G/DL — LOW (ref 32–36)
MCHC RBC-ENTMCNC: 31.9 GM/DL — LOW (ref 32–36)
MCHC RBC-ENTMCNC: 31.9 GM/DL — LOW (ref 32–36)
MCHC RBC-ENTMCNC: 31.9 PG — SIGNIFICANT CHANGE UP (ref 27–34)
MCHC RBC-ENTMCNC: 32 G/DL — SIGNIFICANT CHANGE UP (ref 32–36)
MCHC RBC-ENTMCNC: 32 PG — SIGNIFICANT CHANGE UP (ref 27–34)
MCHC RBC-ENTMCNC: 32.1 G/DL — SIGNIFICANT CHANGE UP (ref 32–36)
MCHC RBC-ENTMCNC: 32.1 GM/DL — SIGNIFICANT CHANGE UP (ref 32–36)
MCHC RBC-ENTMCNC: 32.1 PG — SIGNIFICANT CHANGE UP (ref 27–34)
MCHC RBC-ENTMCNC: 32.2 PG — SIGNIFICANT CHANGE UP (ref 27–34)
MCHC RBC-ENTMCNC: 32.2 PG — SIGNIFICANT CHANGE UP (ref 27–34)
MCHC RBC-ENTMCNC: 32.4 G/DL — SIGNIFICANT CHANGE UP (ref 32–36)
MCHC RBC-ENTMCNC: 32.4 G/DL — SIGNIFICANT CHANGE UP (ref 32–36)
MCHC RBC-ENTMCNC: 32.4 GM/DL — SIGNIFICANT CHANGE UP (ref 32–36)
MCHC RBC-ENTMCNC: 32.5 PG — SIGNIFICANT CHANGE UP (ref 27–34)
MCHC RBC-ENTMCNC: 32.6 G/DL — SIGNIFICANT CHANGE UP (ref 32–36)
MCHC RBC-ENTMCNC: 33 GM/DL — SIGNIFICANT CHANGE UP (ref 32–36)
MCHC RBC-ENTMCNC: 33.2 GM/DL — SIGNIFICANT CHANGE UP (ref 32–36)
MCHC RBC-ENTMCNC: 33.3 GM/DL — SIGNIFICANT CHANGE UP (ref 32–36)
MCHC RBC-ENTMCNC: 33.3 GM/DL — SIGNIFICANT CHANGE UP (ref 32–36)
MCHC RBC-ENTMCNC: 34 GM/DL — SIGNIFICANT CHANGE UP (ref 32–36)
MCHC RBC-ENTMCNC: 34 GM/DL — SIGNIFICANT CHANGE UP (ref 32–36)
MCHC RBC-ENTMCNC: 34.4 GM/DL — SIGNIFICANT CHANGE UP (ref 32–36)
MCHC RBC-ENTMCNC: 34.4 GM/DL — SIGNIFICANT CHANGE UP (ref 32–36)
MCHC RBC-ENTMCNC: 35.3 GM/DL — SIGNIFICANT CHANGE UP (ref 32–36)
MCHC RBC-ENTMCNC: 35.3 GM/DL — SIGNIFICANT CHANGE UP (ref 32–36)
MCV RBC AUTO: 100 FL — SIGNIFICANT CHANGE UP (ref 80–100)
MCV RBC AUTO: 100.4 FL — HIGH (ref 80–100)
MCV RBC AUTO: 100.5 FL — HIGH (ref 80–100)
MCV RBC AUTO: 100.9 FL — HIGH (ref 80–100)
MCV RBC AUTO: 101.6 FL — HIGH (ref 80–100)
MCV RBC AUTO: 102.2 FL — HIGH (ref 80–100)
MCV RBC AUTO: 103.3 FL — HIGH (ref 80–100)
MCV RBC AUTO: 104.7 FL — HIGH (ref 80–100)
MCV RBC AUTO: 104.8 FL — HIGH (ref 80–100)
MCV RBC AUTO: 105 FL — HIGH (ref 80–100)
MCV RBC AUTO: 107.7 FL — HIGH (ref 80–100)
MCV RBC AUTO: 86 FL — SIGNIFICANT CHANGE UP (ref 80–100)
MCV RBC AUTO: 86 FL — SIGNIFICANT CHANGE UP (ref 80–100)
MCV RBC AUTO: 86.2 FL — SIGNIFICANT CHANGE UP (ref 80–100)
MCV RBC AUTO: 86.2 FL — SIGNIFICANT CHANGE UP (ref 80–100)
MCV RBC AUTO: 90.5 FL — SIGNIFICANT CHANGE UP (ref 80–100)
MCV RBC AUTO: 90.5 FL — SIGNIFICANT CHANGE UP (ref 80–100)
MCV RBC AUTO: 90.7 FL — SIGNIFICANT CHANGE UP (ref 80–100)
MCV RBC AUTO: 90.7 FL — SIGNIFICANT CHANGE UP (ref 80–100)
MCV RBC AUTO: 95.3 FL — SIGNIFICANT CHANGE UP (ref 80–100)
MCV RBC AUTO: 96.4 FL — SIGNIFICANT CHANGE UP (ref 80–100)
MCV RBC AUTO: 96.5 FL — SIGNIFICANT CHANGE UP (ref 80–100)
MCV RBC AUTO: 96.9 FL — SIGNIFICANT CHANGE UP (ref 80–100)
MCV RBC AUTO: 96.9 FL — SIGNIFICANT CHANGE UP (ref 80–100)
MCV RBC AUTO: 97 FL — SIGNIFICANT CHANGE UP (ref 80–100)
MCV RBC AUTO: 97.3 FL — SIGNIFICANT CHANGE UP (ref 80–100)
MCV RBC AUTO: 97.4 FL — SIGNIFICANT CHANGE UP (ref 80–100)
MCV RBC AUTO: 97.7 FL — SIGNIFICANT CHANGE UP (ref 80–100)
MCV RBC AUTO: 98.3 FL — SIGNIFICANT CHANGE UP (ref 80–100)
MCV RBC AUTO: 98.6 FL — SIGNIFICANT CHANGE UP (ref 80–100)
MCV RBC AUTO: 98.8 FL — SIGNIFICANT CHANGE UP (ref 80–100)
MCV RBC AUTO: 99.2 FL — SIGNIFICANT CHANGE UP (ref 80–100)
MCV RBC AUTO: 99.2 FL — SIGNIFICANT CHANGE UP (ref 80–100)
MCV RBC AUTO: 99.6 FL — SIGNIFICANT CHANGE UP (ref 80–100)
METHOD TYPE: SIGNIFICANT CHANGE UP
MICROCYTES BLD QL: SLIGHT — SIGNIFICANT CHANGE UP
MICROCYTES BLD QL: SLIGHT — SIGNIFICANT CHANGE UP
MONOCYTES # BLD AUTO: 0.25 K/UL — SIGNIFICANT CHANGE UP (ref 0–0.9)
MONOCYTES # BLD AUTO: 0.54 K/UL — SIGNIFICANT CHANGE UP (ref 0–0.9)
MONOCYTES # BLD AUTO: 0.56 K/UL — SIGNIFICANT CHANGE UP (ref 0–0.9)
MONOCYTES # BLD AUTO: 0.6 K/UL — SIGNIFICANT CHANGE UP (ref 0–0.9)
MONOCYTES # BLD AUTO: 0.62 K/UL — SIGNIFICANT CHANGE UP (ref 0–0.9)
MONOCYTES # BLD AUTO: 0.65 K/UL — SIGNIFICANT CHANGE UP (ref 0–0.9)
MONOCYTES # BLD AUTO: 0.66 K/UL — SIGNIFICANT CHANGE UP (ref 0–0.9)
MONOCYTES # BLD AUTO: 0.7 K/UL — SIGNIFICANT CHANGE UP (ref 0–0.9)
MONOCYTES # BLD AUTO: 0.7 K/UL — SIGNIFICANT CHANGE UP (ref 0–0.9)
MONOCYTES # BLD AUTO: 0.89 K/UL — SIGNIFICANT CHANGE UP (ref 0–0.9)
MONOCYTES # BLD AUTO: 0.9 K/UL — SIGNIFICANT CHANGE UP (ref 0–0.9)
MONOCYTES # BLD AUTO: 1.05 K/UL — HIGH (ref 0–0.9)
MONOCYTES # BLD AUTO: 1.24 K/UL — HIGH (ref 0–0.9)
MONOCYTES # BLD AUTO: 1.31 K/UL — HIGH (ref 0–0.9)
MONOCYTES NFR BLD AUTO: 10.5 % — SIGNIFICANT CHANGE UP (ref 2–14)
MONOCYTES NFR BLD AUTO: 11.5 % — SIGNIFICANT CHANGE UP (ref 2–14)
MONOCYTES NFR BLD AUTO: 3.8 % — SIGNIFICANT CHANGE UP (ref 2–14)
MONOCYTES NFR BLD AUTO: 4.5 % — SIGNIFICANT CHANGE UP (ref 2–14)
MONOCYTES NFR BLD AUTO: 5 % — SIGNIFICANT CHANGE UP (ref 2–14)
MONOCYTES NFR BLD AUTO: 5.6 % — SIGNIFICANT CHANGE UP (ref 2–14)
MONOCYTES NFR BLD AUTO: 6 % — SIGNIFICANT CHANGE UP (ref 2–14)
MONOCYTES NFR BLD AUTO: 6 % — SIGNIFICANT CHANGE UP (ref 2–14)
MONOCYTES NFR BLD AUTO: 6.9 % — SIGNIFICANT CHANGE UP (ref 2–14)
MONOCYTES NFR BLD AUTO: 7.8 % — SIGNIFICANT CHANGE UP (ref 2–14)
MONOCYTES NFR BLD AUTO: 7.8 % — SIGNIFICANT CHANGE UP (ref 2–14)
MONOCYTES NFR BLD AUTO: 9.3 % — SIGNIFICANT CHANGE UP (ref 2–14)
MONOCYTES NFR BLD AUTO: 9.4 % — SIGNIFICANT CHANGE UP (ref 2–14)
MONOCYTES NFR BLD AUTO: 9.6 % — SIGNIFICANT CHANGE UP (ref 2–14)
MRSA PCR RESULT.: SIGNIFICANT CHANGE UP
MRSA PCR RESULT.: SIGNIFICANT CHANGE UP
NEUTROPHILS # BLD AUTO: 10.18 K/UL — HIGH (ref 1.8–7.4)
NEUTROPHILS # BLD AUTO: 10.62 K/UL — HIGH (ref 1.8–7.4)
NEUTROPHILS # BLD AUTO: 12.84 K/UL — HIGH (ref 1.8–7.4)
NEUTROPHILS # BLD AUTO: 4.96 K/UL — SIGNIFICANT CHANGE UP (ref 1.8–7.4)
NEUTROPHILS # BLD AUTO: 5.11 K/UL — SIGNIFICANT CHANGE UP (ref 1.8–7.4)
NEUTROPHILS # BLD AUTO: 5.22 K/UL — SIGNIFICANT CHANGE UP (ref 1.8–7.4)
NEUTROPHILS # BLD AUTO: 5.43 K/UL — SIGNIFICANT CHANGE UP (ref 1.8–7.4)
NEUTROPHILS # BLD AUTO: 5.55 K/UL — SIGNIFICANT CHANGE UP (ref 1.8–7.4)
NEUTROPHILS # BLD AUTO: 5.8 K/UL — SIGNIFICANT CHANGE UP (ref 1.8–7.4)
NEUTROPHILS # BLD AUTO: 6.73 K/UL — SIGNIFICANT CHANGE UP (ref 1.8–7.4)
NEUTROPHILS # BLD AUTO: 8.05 K/UL — HIGH (ref 1.8–7.4)
NEUTROPHILS # BLD AUTO: 8.21 K/UL — HIGH (ref 1.8–7.4)
NEUTROPHILS # BLD AUTO: 8.6 K/UL — HIGH (ref 1.8–7.4)
NEUTROPHILS # BLD AUTO: 8.72 K/UL — HIGH (ref 1.8–7.4)
NEUTROPHILS NFR BLD AUTO: 60.3 % — SIGNIFICANT CHANGE UP (ref 43–77)
NEUTROPHILS NFR BLD AUTO: 60.7 % — SIGNIFICANT CHANGE UP (ref 43–77)
NEUTROPHILS NFR BLD AUTO: 64.3 % — SIGNIFICANT CHANGE UP (ref 43–77)
NEUTROPHILS NFR BLD AUTO: 65.3 % — SIGNIFICANT CHANGE UP (ref 43–77)
NEUTROPHILS NFR BLD AUTO: 65.3 % — SIGNIFICANT CHANGE UP (ref 43–77)
NEUTROPHILS NFR BLD AUTO: 66.7 % — SIGNIFICANT CHANGE UP (ref 43–77)
NEUTROPHILS NFR BLD AUTO: 69.2 % — SIGNIFICANT CHANGE UP (ref 43–77)
NEUTROPHILS NFR BLD AUTO: 73.5 % — SIGNIFICANT CHANGE UP (ref 43–77)
NEUTROPHILS NFR BLD AUTO: 76 % — SIGNIFICANT CHANGE UP (ref 43–77)
NEUTROPHILS NFR BLD AUTO: 76.4 % — SIGNIFICANT CHANGE UP (ref 43–77)
NEUTROPHILS NFR BLD AUTO: 77.5 % — HIGH (ref 43–77)
NEUTROPHILS NFR BLD AUTO: 81.9 % — HIGH (ref 43–77)
NEUTROPHILS NFR BLD AUTO: 82.8 % — HIGH (ref 43–77)
NEUTROPHILS NFR BLD AUTO: 83.3 % — HIGH (ref 43–77)
NEUTS BAND # BLD: 3 % — SIGNIFICANT CHANGE UP (ref 0–8)
NEUTS BAND # BLD: 5.3 % — SIGNIFICANT CHANGE UP (ref 0–6)
NITRITE UR-MCNC: NEGATIVE — SIGNIFICANT CHANGE UP
NITRITE UR-MCNC: NEGATIVE — SIGNIFICANT CHANGE UP
NRBC # BLD: 0 /100 WBCS — SIGNIFICANT CHANGE UP (ref 0–0)
NRBC # BLD: 2 /100 WBCS — HIGH (ref 0–0)
NRBC # BLD: 3 /100 WBCS — HIGH (ref 0–0)
NRBC # BLD: 5 /100 WBCS — HIGH (ref 0–0)
NRBC # BLD: SIGNIFICANT CHANGE UP /100 WBCS (ref 0–0)
NRBC # FLD: 0 K/UL — SIGNIFICANT CHANGE UP (ref 0–0)
NRBC # FLD: 0.02 K/UL — HIGH (ref 0–0)
NRBC # FLD: 0.03 K/UL — HIGH (ref 0–0)
NRBC # FLD: 0.03 K/UL — HIGH (ref 0–0)
NRBC # FLD: 0.04 K/UL — HIGH (ref 0–0)
NRBC # FLD: 0.04 K/UL — HIGH (ref 0–0)
NRBC # FLD: 0.06 K/UL — HIGH (ref 0–0)
NSE FLD-MCNC: 46.2 NG/ML — HIGH (ref 0–17.6)
NT-PROBNP SERPL-SCNC: HIGH PG/ML (ref 0–450)
OB PNL STL: POSITIVE
ORGANISM # SPEC MICROSCOPIC CNT: ABNORMAL
ORGANISM # SPEC MICROSCOPIC CNT: SIGNIFICANT CHANGE UP
ORGANISM # SPEC MICROSCOPIC CNT: SIGNIFICANT CHANGE UP
PCO2 BLDA: 45 MMHG — SIGNIFICANT CHANGE UP (ref 32–46)
PCO2 BLDA: 62 MMHG — HIGH (ref 32–46)
PH BLDA: 7.22 — LOW (ref 7.35–7.45)
PH BLDA: 7.38 — SIGNIFICANT CHANGE UP (ref 7.35–7.45)
PH UR: 6.5 — SIGNIFICANT CHANGE UP (ref 5–8)
PH UR: 7.5 — SIGNIFICANT CHANGE UP (ref 5–8)
PHOSPHATE SERPL-MCNC: 2.2 MG/DL — LOW (ref 2.5–4.5)
PHOSPHATE SERPL-MCNC: 2.2 MG/DL — LOW (ref 2.5–4.5)
PHOSPHATE SERPL-MCNC: 2.4 MG/DL — LOW (ref 2.5–4.5)
PHOSPHATE SERPL-MCNC: 2.5 MG/DL — SIGNIFICANT CHANGE UP (ref 2.5–4.5)
PHOSPHATE SERPL-MCNC: 2.9 MG/DL — SIGNIFICANT CHANGE UP (ref 2.5–4.5)
PHOSPHATE SERPL-MCNC: 2.9 MG/DL — SIGNIFICANT CHANGE UP (ref 2.5–4.5)
PHOSPHATE SERPL-MCNC: 3 MG/DL — SIGNIFICANT CHANGE UP (ref 2.5–4.5)
PHOSPHATE SERPL-MCNC: 3 MG/DL — SIGNIFICANT CHANGE UP (ref 2.5–4.5)
PHOSPHATE SERPL-MCNC: 3.1 MG/DL — SIGNIFICANT CHANGE UP (ref 2.5–4.5)
PHOSPHATE SERPL-MCNC: 3.3 MG/DL — SIGNIFICANT CHANGE UP (ref 2.5–4.5)
PHOSPHATE SERPL-MCNC: 3.7 MG/DL — SIGNIFICANT CHANGE UP (ref 2.5–4.5)
PHOSPHATE SERPL-MCNC: 4 MG/DL — SIGNIFICANT CHANGE UP (ref 2.5–4.5)
PHOSPHATE SERPL-MCNC: 4.1 MG/DL — SIGNIFICANT CHANGE UP (ref 2.5–4.5)
PHOSPHATE SERPL-MCNC: 4.6 MG/DL — HIGH (ref 2.5–4.5)
PHOSPHATE SERPL-MCNC: 4.6 MG/DL — HIGH (ref 2.5–4.5)
PHOSPHATE SERPL-MCNC: 4.8 MG/DL — HIGH (ref 2.5–4.5)
PHOSPHATE SERPL-MCNC: 5.5 MG/DL — HIGH (ref 2.5–4.5)
PHOSPHATE SERPL-MCNC: 5.6 MG/DL — HIGH (ref 2.5–4.5)
PHOSPHATE SERPL-MCNC: 5.8 MG/DL — HIGH (ref 2.5–4.5)
PLAT MORPH BLD: NORMAL — SIGNIFICANT CHANGE UP
PLATELET # BLD AUTO: 163 K/UL — SIGNIFICANT CHANGE UP (ref 150–400)
PLATELET # BLD AUTO: 170 K/UL — SIGNIFICANT CHANGE UP (ref 150–400)
PLATELET # BLD AUTO: 170 K/UL — SIGNIFICANT CHANGE UP (ref 150–400)
PLATELET # BLD AUTO: 175 K/UL — SIGNIFICANT CHANGE UP (ref 150–400)
PLATELET # BLD AUTO: 182 K/UL — SIGNIFICANT CHANGE UP (ref 150–400)
PLATELET # BLD AUTO: 195 K/UL — SIGNIFICANT CHANGE UP (ref 150–400)
PLATELET # BLD AUTO: 218 K/UL — SIGNIFICANT CHANGE UP (ref 150–400)
PLATELET # BLD AUTO: 226 K/UL — SIGNIFICANT CHANGE UP (ref 150–400)
PLATELET # BLD AUTO: 237 K/UL — SIGNIFICANT CHANGE UP (ref 150–400)
PLATELET # BLD AUTO: 237 K/UL — SIGNIFICANT CHANGE UP (ref 150–400)
PLATELET # BLD AUTO: 243 K/UL — SIGNIFICANT CHANGE UP (ref 150–400)
PLATELET # BLD AUTO: 268 K/UL — SIGNIFICANT CHANGE UP (ref 150–400)
PLATELET # BLD AUTO: 271 K/UL — SIGNIFICANT CHANGE UP (ref 150–400)
PLATELET # BLD AUTO: 273 K/UL — SIGNIFICANT CHANGE UP (ref 150–400)
PLATELET # BLD AUTO: 275 K/UL — SIGNIFICANT CHANGE UP (ref 150–400)
PLATELET # BLD AUTO: 283 K/UL — SIGNIFICANT CHANGE UP (ref 150–400)
PLATELET # BLD AUTO: 287 K/UL — SIGNIFICANT CHANGE UP (ref 150–400)
PLATELET # BLD AUTO: 299 K/UL — SIGNIFICANT CHANGE UP (ref 150–400)
PLATELET # BLD AUTO: 300 K/UL — SIGNIFICANT CHANGE UP (ref 150–400)
PLATELET # BLD AUTO: 300 K/UL — SIGNIFICANT CHANGE UP (ref 150–400)
PLATELET # BLD AUTO: 301 K/UL — SIGNIFICANT CHANGE UP (ref 150–400)
PLATELET # BLD AUTO: 301 K/UL — SIGNIFICANT CHANGE UP (ref 150–400)
PLATELET # BLD AUTO: 306 K/UL — SIGNIFICANT CHANGE UP (ref 150–400)
PLATELET # BLD AUTO: 307 K/UL — SIGNIFICANT CHANGE UP (ref 150–400)
PLATELET # BLD AUTO: 312 K/UL — SIGNIFICANT CHANGE UP (ref 150–400)
PLATELET # BLD AUTO: 318 K/UL — SIGNIFICANT CHANGE UP (ref 150–400)
PLATELET # BLD AUTO: 318 K/UL — SIGNIFICANT CHANGE UP (ref 150–400)
PLATELET # BLD AUTO: 329 K/UL — SIGNIFICANT CHANGE UP (ref 150–400)
PLATELET # BLD AUTO: 336 K/UL — SIGNIFICANT CHANGE UP (ref 150–400)
PLATELET # BLD AUTO: 336 K/UL — SIGNIFICANT CHANGE UP (ref 150–400)
PLATELET # BLD AUTO: 352 K/UL — SIGNIFICANT CHANGE UP (ref 150–400)
PLATELET # BLD AUTO: 377 K/UL — SIGNIFICANT CHANGE UP (ref 150–400)
PLATELET # BLD AUTO: 392 K/UL — SIGNIFICANT CHANGE UP (ref 150–400)
PLATELET # BLD AUTO: 392 K/UL — SIGNIFICANT CHANGE UP (ref 150–400)
PLATELET COUNT - ESTIMATE: NORMAL — SIGNIFICANT CHANGE UP
PO2 BLDA: 41 MMHG — LOW (ref 83–108)
PO2 BLDA: 427 MMHG — HIGH (ref 83–108)
POIKILOCYTOSIS BLD QL AUTO: SLIGHT — SIGNIFICANT CHANGE UP
POLYCHROMASIA BLD QL SMEAR: SLIGHT — SIGNIFICANT CHANGE UP
POLYCHROMASIA BLD QL SMEAR: SLIGHT — SIGNIFICANT CHANGE UP
POTASSIUM SERPL-MCNC: 3.1 MMOL/L — LOW (ref 3.5–5.3)
POTASSIUM SERPL-MCNC: 3.1 MMOL/L — LOW (ref 3.5–5.3)
POTASSIUM SERPL-MCNC: 3.2 MMOL/L — LOW (ref 3.5–5.3)
POTASSIUM SERPL-MCNC: 3.3 MMOL/L — LOW (ref 3.5–5.3)
POTASSIUM SERPL-MCNC: 3.4 MMOL/L — LOW (ref 3.5–5.3)
POTASSIUM SERPL-MCNC: 3.5 MMOL/L — SIGNIFICANT CHANGE UP (ref 3.5–5.3)
POTASSIUM SERPL-MCNC: 3.7 MMOL/L — SIGNIFICANT CHANGE UP (ref 3.5–5.3)
POTASSIUM SERPL-MCNC: 3.7 MMOL/L — SIGNIFICANT CHANGE UP (ref 3.5–5.3)
POTASSIUM SERPL-MCNC: 3.8 MMOL/L — SIGNIFICANT CHANGE UP (ref 3.5–5.3)
POTASSIUM SERPL-MCNC: 4.1 MMOL/L — SIGNIFICANT CHANGE UP (ref 3.5–5.3)
POTASSIUM SERPL-MCNC: 4.2 MMOL/L — SIGNIFICANT CHANGE UP (ref 3.5–5.3)
POTASSIUM SERPL-MCNC: 4.3 MMOL/L — SIGNIFICANT CHANGE UP (ref 3.5–5.3)
POTASSIUM SERPL-MCNC: 4.4 MMOL/L — SIGNIFICANT CHANGE UP (ref 3.5–5.3)
POTASSIUM SERPL-MCNC: 4.4 MMOL/L — SIGNIFICANT CHANGE UP (ref 3.5–5.3)
POTASSIUM SERPL-MCNC: 4.5 MMOL/L — SIGNIFICANT CHANGE UP (ref 3.5–5.3)
POTASSIUM SERPL-MCNC: 4.5 MMOL/L — SIGNIFICANT CHANGE UP (ref 3.5–5.3)
POTASSIUM SERPL-MCNC: 4.6 MMOL/L — SIGNIFICANT CHANGE UP (ref 3.5–5.3)
POTASSIUM SERPL-MCNC: 4.6 MMOL/L — SIGNIFICANT CHANGE UP (ref 3.5–5.3)
POTASSIUM SERPL-MCNC: 4.7 MMOL/L — SIGNIFICANT CHANGE UP (ref 3.5–5.3)
POTASSIUM SERPL-MCNC: 4.8 MMOL/L — SIGNIFICANT CHANGE UP (ref 3.5–5.3)
POTASSIUM SERPL-MCNC: 4.9 MMOL/L — SIGNIFICANT CHANGE UP (ref 3.5–5.3)
POTASSIUM SERPL-SCNC: 3.1 MMOL/L — LOW (ref 3.5–5.3)
POTASSIUM SERPL-SCNC: 3.1 MMOL/L — LOW (ref 3.5–5.3)
POTASSIUM SERPL-SCNC: 3.2 MMOL/L — LOW (ref 3.5–5.3)
POTASSIUM SERPL-SCNC: 3.3 MMOL/L — LOW (ref 3.5–5.3)
POTASSIUM SERPL-SCNC: 3.4 MMOL/L — LOW (ref 3.5–5.3)
POTASSIUM SERPL-SCNC: 3.5 MMOL/L — SIGNIFICANT CHANGE UP (ref 3.5–5.3)
POTASSIUM SERPL-SCNC: 3.7 MMOL/L — SIGNIFICANT CHANGE UP (ref 3.5–5.3)
POTASSIUM SERPL-SCNC: 3.7 MMOL/L — SIGNIFICANT CHANGE UP (ref 3.5–5.3)
POTASSIUM SERPL-SCNC: 3.8 MMOL/L — SIGNIFICANT CHANGE UP (ref 3.5–5.3)
POTASSIUM SERPL-SCNC: 4.1 MMOL/L — SIGNIFICANT CHANGE UP (ref 3.5–5.3)
POTASSIUM SERPL-SCNC: 4.2 MMOL/L — SIGNIFICANT CHANGE UP (ref 3.5–5.3)
POTASSIUM SERPL-SCNC: 4.3 MMOL/L — SIGNIFICANT CHANGE UP (ref 3.5–5.3)
POTASSIUM SERPL-SCNC: 4.4 MMOL/L — SIGNIFICANT CHANGE UP (ref 3.5–5.3)
POTASSIUM SERPL-SCNC: 4.4 MMOL/L — SIGNIFICANT CHANGE UP (ref 3.5–5.3)
POTASSIUM SERPL-SCNC: 4.5 MMOL/L — SIGNIFICANT CHANGE UP (ref 3.5–5.3)
POTASSIUM SERPL-SCNC: 4.5 MMOL/L — SIGNIFICANT CHANGE UP (ref 3.5–5.3)
POTASSIUM SERPL-SCNC: 4.6 MMOL/L — SIGNIFICANT CHANGE UP (ref 3.5–5.3)
POTASSIUM SERPL-SCNC: 4.6 MMOL/L — SIGNIFICANT CHANGE UP (ref 3.5–5.3)
POTASSIUM SERPL-SCNC: 4.7 MMOL/L — SIGNIFICANT CHANGE UP (ref 3.5–5.3)
POTASSIUM SERPL-SCNC: 4.8 MMOL/L — SIGNIFICANT CHANGE UP (ref 3.5–5.3)
POTASSIUM SERPL-SCNC: 4.9 MMOL/L — SIGNIFICANT CHANGE UP (ref 3.5–5.3)
PROCALCITONIN SERPL-MCNC: 1.6 NG/ML — HIGH (ref 0.02–0.1)
PROT SERPL-MCNC: 4.2 G/DL — LOW (ref 6–8.3)
PROT SERPL-MCNC: 4.2 G/DL — LOW (ref 6–8.3)
PROT SERPL-MCNC: 6.2 GM/DL — SIGNIFICANT CHANGE UP (ref 6–8.3)
PROT SERPL-MCNC: 6.2 GM/DL — SIGNIFICANT CHANGE UP (ref 6–8.3)
PROT SERPL-MCNC: 6.3 G/DL — SIGNIFICANT CHANGE UP (ref 6–8.3)
PROT SERPL-MCNC: 6.3 G/DL — SIGNIFICANT CHANGE UP (ref 6–8.3)
PROT SERPL-MCNC: 6.3 GM/DL — SIGNIFICANT CHANGE UP (ref 6–8.3)
PROT SERPL-MCNC: 6.4 GM/DL — SIGNIFICANT CHANGE UP (ref 6–8.3)
PROT SERPL-MCNC: 6.6 GM/DL — SIGNIFICANT CHANGE UP (ref 6–8.3)
PROT SERPL-MCNC: 6.8 G/DL — SIGNIFICANT CHANGE UP (ref 6–8.3)
PROT SERPL-MCNC: 6.8 GM/DL — SIGNIFICANT CHANGE UP (ref 6–8.3)
PROT SERPL-MCNC: 6.8 GM/DL — SIGNIFICANT CHANGE UP (ref 6–8.3)
PROT SERPL-MCNC: 6.9 G/DL — SIGNIFICANT CHANGE UP (ref 6–8.3)
PROT SERPL-MCNC: 6.9 GM/DL — SIGNIFICANT CHANGE UP (ref 6–8.3)
PROT SERPL-MCNC: 7 GM/DL — SIGNIFICANT CHANGE UP (ref 6–8.3)
PROT SERPL-MCNC: 7 GM/DL — SIGNIFICANT CHANGE UP (ref 6–8.3)
PROT SERPL-MCNC: 7.3 G/DL — SIGNIFICANT CHANGE UP (ref 6–8.3)
PROT SERPL-MCNC: 7.3 GM/DL — SIGNIFICANT CHANGE UP (ref 6–8.3)
PROT SERPL-MCNC: 7.5 G/DL — SIGNIFICANT CHANGE UP (ref 6–8.3)
PROT SERPL-MCNC: 8.2 GM/DL — SIGNIFICANT CHANGE UP (ref 6–8.3)
PROT UR-MCNC: 30 MG/DL
PROT UR-MCNC: SIGNIFICANT CHANGE UP MG/DL
PROTHROM AB SERPL-ACNC: 12 SEC — SIGNIFICANT CHANGE UP (ref 9.5–13)
PROTHROM AB SERPL-ACNC: 12.4 SEC — SIGNIFICANT CHANGE UP (ref 9.5–13)
PROTHROM AB SERPL-ACNC: 13 SEC — SIGNIFICANT CHANGE UP (ref 9.5–13)
RBC # BLD: 1.72 M/UL — LOW (ref 4.2–5.8)
RBC # BLD: 1.72 M/UL — LOW (ref 4.2–5.8)
RBC # BLD: 1.81 M/UL — LOW (ref 4.2–5.8)
RBC # BLD: 1.81 M/UL — LOW (ref 4.2–5.8)
RBC # BLD: 1.96 M/UL — LOW (ref 4.2–5.8)
RBC # BLD: 2.03 M/UL — LOW (ref 4.2–5.8)
RBC # BLD: 2.08 M/UL — LOW (ref 4.2–5.8)
RBC # BLD: 2.11 M/UL — LOW (ref 4.2–5.8)
RBC # BLD: 2.18 M/UL — LOW (ref 4.2–5.8)
RBC # BLD: 2.21 M/UL — LOW (ref 4.2–5.8)
RBC # BLD: 2.22 M/UL — LOW (ref 4.2–5.8)
RBC # BLD: 2.26 M/UL — LOW (ref 4.2–5.8)
RBC # BLD: 2.27 M/UL — LOW (ref 4.2–5.8)
RBC # BLD: 2.29 M/UL — LOW (ref 4.2–5.8)
RBC # BLD: 2.3 M/UL — LOW (ref 4.2–5.8)
RBC # BLD: 2.31 M/UL — LOW (ref 4.2–5.8)
RBC # BLD: 2.31 M/UL — LOW (ref 4.2–5.8)
RBC # BLD: 2.33 M/UL — LOW (ref 4.2–5.8)
RBC # BLD: 2.36 M/UL — LOW (ref 4.2–5.8)
RBC # BLD: 2.4 M/UL — LOW (ref 4.2–5.8)
RBC # BLD: 2.47 M/UL — LOW (ref 4.2–5.8)
RBC # BLD: 2.47 M/UL — LOW (ref 4.2–5.8)
RBC # BLD: 2.49 M/UL — LOW (ref 4.2–5.8)
RBC # BLD: 2.49 M/UL — LOW (ref 4.2–5.8)
RBC # BLD: 2.52 M/UL — LOW (ref 4.2–5.8)
RBC # BLD: 2.59 M/UL — LOW (ref 4.2–5.8)
RBC # BLD: 2.64 M/UL — LOW (ref 4.2–5.8)
RBC # BLD: 2.64 M/UL — LOW (ref 4.2–5.8)
RBC # BLD: 2.73 M/UL — LOW (ref 4.2–5.8)
RBC # BLD: 2.86 M/UL — LOW (ref 4.2–5.8)
RBC # BLD: 3.11 M/UL — LOW (ref 4.2–5.8)
RBC # BLD: 4.28 M/UL — SIGNIFICANT CHANGE UP (ref 4.2–5.8)
RBC # FLD: 15.5 % — HIGH (ref 10.3–14.5)
RBC # FLD: 15.5 % — HIGH (ref 10.3–14.5)
RBC # FLD: 15.8 % — HIGH (ref 10.3–14.5)
RBC # FLD: 15.8 % — HIGH (ref 10.3–14.5)
RBC # FLD: 15.9 % — HIGH (ref 10.3–14.5)
RBC # FLD: 16.1 % — HIGH (ref 10.3–14.5)
RBC # FLD: 16.2 % — HIGH (ref 10.3–14.5)
RBC # FLD: 16.3 % — HIGH (ref 10.3–14.5)
RBC # FLD: 16.3 % — HIGH (ref 10.3–14.5)
RBC # FLD: 16.4 % — HIGH (ref 10.3–14.5)
RBC # FLD: 16.8 % — HIGH (ref 10.3–14.5)
RBC # FLD: 16.9 % — HIGH (ref 10.3–14.5)
RBC # FLD: 17 % — HIGH (ref 10.3–14.5)
RBC # FLD: 17.2 % — HIGH (ref 10.3–14.5)
RBC # FLD: 17.3 % — HIGH (ref 10.3–14.5)
RBC # FLD: 17.3 % — HIGH (ref 10.3–14.5)
RBC # FLD: 17.5 % — HIGH (ref 10.3–14.5)
RBC # FLD: 17.6 % — HIGH (ref 10.3–14.5)
RBC # FLD: 17.7 % — HIGH (ref 10.3–14.5)
RBC # FLD: 17.8 % — HIGH (ref 10.3–14.5)
RBC # FLD: 17.8 % — HIGH (ref 10.3–14.5)
RBC # FLD: 17.9 % — HIGH (ref 10.3–14.5)
RBC # FLD: 18 % — HIGH (ref 10.3–14.5)
RBC # FLD: 18 % — HIGH (ref 10.3–14.5)
RBC # FLD: 18.3 % — HIGH (ref 10.3–14.5)
RBC # FLD: 18.4 % — HIGH (ref 10.3–14.5)
RBC # FLD: 18.6 % — HIGH (ref 10.3–14.5)
RBC # FLD: 19.3 % — HIGH (ref 10.3–14.5)
RBC BLD AUTO: ABNORMAL
RBC CASTS # UR COMP ASSIST: 0 /HPF — SIGNIFICANT CHANGE UP (ref 0–4)
RBC CASTS # UR COMP ASSIST: 140 /HPF — HIGH (ref 0–4)
REVIEW: SIGNIFICANT CHANGE UP
RH IG SCN BLD-IMP: POSITIVE — SIGNIFICANT CHANGE UP
S AUREUS DNA NOSE QL NAA+PROBE: SIGNIFICANT CHANGE UP
S AUREUS DNA NOSE QL NAA+PROBE: SIGNIFICANT CHANGE UP
SAO2 % BLDA: 51.6 % — LOW (ref 94–98)
SAO2 % BLDA: 99 % — HIGH (ref 94–98)
SARS-COV-2 RNA SPEC QL NAA+PROBE: SIGNIFICANT CHANGE UP
SODIUM SERPL-SCNC: 132 MMOL/L — LOW (ref 135–145)
SODIUM SERPL-SCNC: 135 MMOL/L — SIGNIFICANT CHANGE UP (ref 135–145)
SODIUM SERPL-SCNC: 135 MMOL/L — SIGNIFICANT CHANGE UP (ref 135–145)
SODIUM SERPL-SCNC: 137 MMOL/L — SIGNIFICANT CHANGE UP (ref 135–145)
SODIUM SERPL-SCNC: 138 MMOL/L — SIGNIFICANT CHANGE UP (ref 135–145)
SODIUM SERPL-SCNC: 139 MMOL/L — SIGNIFICANT CHANGE UP (ref 135–145)
SODIUM SERPL-SCNC: 142 MMOL/L — SIGNIFICANT CHANGE UP (ref 135–145)
SODIUM SERPL-SCNC: 143 MMOL/L — SIGNIFICANT CHANGE UP (ref 135–145)
SODIUM SERPL-SCNC: 144 MMOL/L — SIGNIFICANT CHANGE UP (ref 135–145)
SODIUM SERPL-SCNC: 145 MMOL/L — SIGNIFICANT CHANGE UP (ref 135–145)
SODIUM SERPL-SCNC: 145 MMOL/L — SIGNIFICANT CHANGE UP (ref 135–145)
SODIUM SERPL-SCNC: 146 MMOL/L — HIGH (ref 135–145)
SODIUM SERPL-SCNC: 147 MMOL/L — HIGH (ref 135–145)
SP GR SPEC: 1.01 — SIGNIFICANT CHANGE UP (ref 1–1.03)
SP GR SPEC: 1.01 — SIGNIFICANT CHANGE UP (ref 1–1.03)
SPECIMEN SOURCE: SIGNIFICANT CHANGE UP
SQUAMOUS # UR AUTO: 18 /HPF — HIGH (ref 0–5)
STOMATOCYTES BLD QL SMEAR: SLIGHT — SIGNIFICANT CHANGE UP
TIBC SERPL-MCNC: 199 UG/DL — LOW (ref 220–430)
TROPONIN I, HIGH SENSITIVITY RESULT: 136.7 NG/L — HIGH
TROPONIN I, HIGH SENSITIVITY RESULT: 139.6 NG/L — HIGH
TROPONIN I, HIGH SENSITIVITY RESULT: 145.5 NG/L — HIGH
TROPONIN I, HIGH SENSITIVITY RESULT: 525.8 NG/L — HIGH
TROPONIN I, HIGH SENSITIVITY RESULT: 926.3 NG/L — HIGH
TROPONIN T, HIGH SENSITIVITY RESULT: 148 NG/L — CRITICAL HIGH
TROPONIN T, HIGH SENSITIVITY RESULT: 148 NG/L — CRITICAL HIGH
UIBC SERPL-MCNC: 138 UG/DL — SIGNIFICANT CHANGE UP (ref 110–370)
UROBILINOGEN FLD QL: 0.2 MG/DL — SIGNIFICANT CHANGE UP (ref 0.2–1)
UROBILINOGEN FLD QL: 1 MG/DL — SIGNIFICANT CHANGE UP (ref 0.2–1)
VALPROATE FREE SERPL-MCNC: 28.9 UG/ML — HIGH (ref 6–22)
VALPROATE SERPL-MCNC: 36.4 UG/ML — LOW (ref 50–100)
VALPROATE SERPL-MCNC: 60 UG/ML — SIGNIFICANT CHANGE UP (ref 50–100)
VALPROATE SERPL-MCNC: 62 UG/ML — SIGNIFICANT CHANGE UP (ref 50–100)
VANCOMYCIN FLD-MCNC: 11.9 UG/ML — SIGNIFICANT CHANGE UP
VARIANT LYMPHS # BLD: 0.9 % — SIGNIFICANT CHANGE UP (ref 0–6)
VIT B12 SERPL-MCNC: 1989 PG/ML — HIGH (ref 232–1245)
WBC # BLD: 10.09 K/UL — SIGNIFICANT CHANGE UP (ref 3.8–10.5)
WBC # BLD: 10.65 K/UL — HIGH (ref 3.8–10.5)
WBC # BLD: 10.89 K/UL — HIGH (ref 3.8–10.5)
WBC # BLD: 10.95 K/UL — HIGH (ref 3.8–10.5)
WBC # BLD: 11.19 K/UL — HIGH (ref 3.8–10.5)
WBC # BLD: 11.24 K/UL — HIGH (ref 3.8–10.5)
WBC # BLD: 11.84 K/UL — HIGH (ref 3.8–10.5)
WBC # BLD: 12.43 K/UL — HIGH (ref 3.8–10.5)
WBC # BLD: 12.79 K/UL — HIGH (ref 3.8–10.5)
WBC # BLD: 12.79 K/UL — HIGH (ref 3.8–10.5)
WBC # BLD: 12.98 K/UL — HIGH (ref 3.8–10.5)
WBC # BLD: 13.09 K/UL — HIGH (ref 3.8–10.5)
WBC # BLD: 13.36 K/UL — HIGH (ref 3.8–10.5)
WBC # BLD: 13.36 K/UL — HIGH (ref 3.8–10.5)
WBC # BLD: 13.91 K/UL — HIGH (ref 3.8–10.5)
WBC # BLD: 13.99 K/UL — HIGH (ref 3.8–10.5)
WBC # BLD: 13.99 K/UL — HIGH (ref 3.8–10.5)
WBC # BLD: 14.12 K/UL — HIGH (ref 3.8–10.5)
WBC # BLD: 14.63 K/UL — HIGH (ref 3.8–10.5)
WBC # BLD: 15.41 K/UL — HIGH (ref 3.8–10.5)
WBC # BLD: 15.57 K/UL — HIGH (ref 3.8–10.5)
WBC # BLD: 15.57 K/UL — HIGH (ref 3.8–10.5)
WBC # BLD: 17.17 K/UL — HIGH (ref 3.8–10.5)
WBC # BLD: 18.97 K/UL — HIGH (ref 3.8–10.5)
WBC # BLD: 19.67 K/UL — HIGH (ref 3.8–10.5)
WBC # BLD: 6.56 K/UL — SIGNIFICANT CHANGE UP (ref 3.8–10.5)
WBC # BLD: 7.02 K/UL — SIGNIFICANT CHANGE UP (ref 3.8–10.5)
WBC # BLD: 7.71 K/UL — SIGNIFICANT CHANGE UP (ref 3.8–10.5)
WBC # BLD: 7.73 K/UL — SIGNIFICANT CHANGE UP (ref 3.8–10.5)
WBC # BLD: 7.83 K/UL — SIGNIFICANT CHANGE UP (ref 3.8–10.5)
WBC # BLD: 7.98 K/UL — SIGNIFICANT CHANGE UP (ref 3.8–10.5)
WBC # BLD: 9.15 K/UL — SIGNIFICANT CHANGE UP (ref 3.8–10.5)
WBC # BLD: 9.35 K/UL — SIGNIFICANT CHANGE UP (ref 3.8–10.5)
WBC # BLD: 9.61 K/UL — SIGNIFICANT CHANGE UP (ref 3.8–10.5)
WBC # FLD AUTO: 10.09 K/UL — SIGNIFICANT CHANGE UP (ref 3.8–10.5)
WBC # FLD AUTO: 10.65 K/UL — HIGH (ref 3.8–10.5)
WBC # FLD AUTO: 10.89 K/UL — HIGH (ref 3.8–10.5)
WBC # FLD AUTO: 10.95 K/UL — HIGH (ref 3.8–10.5)
WBC # FLD AUTO: 11.19 K/UL — HIGH (ref 3.8–10.5)
WBC # FLD AUTO: 11.24 K/UL — HIGH (ref 3.8–10.5)
WBC # FLD AUTO: 11.84 K/UL — HIGH (ref 3.8–10.5)
WBC # FLD AUTO: 12.43 K/UL — HIGH (ref 3.8–10.5)
WBC # FLD AUTO: 12.79 K/UL — HIGH (ref 3.8–10.5)
WBC # FLD AUTO: 12.79 K/UL — HIGH (ref 3.8–10.5)
WBC # FLD AUTO: 12.98 K/UL — HIGH (ref 3.8–10.5)
WBC # FLD AUTO: 13.09 K/UL — HIGH (ref 3.8–10.5)
WBC # FLD AUTO: 13.36 K/UL — HIGH (ref 3.8–10.5)
WBC # FLD AUTO: 13.36 K/UL — HIGH (ref 3.8–10.5)
WBC # FLD AUTO: 13.91 K/UL — HIGH (ref 3.8–10.5)
WBC # FLD AUTO: 13.99 K/UL — HIGH (ref 3.8–10.5)
WBC # FLD AUTO: 13.99 K/UL — HIGH (ref 3.8–10.5)
WBC # FLD AUTO: 14.12 K/UL — HIGH (ref 3.8–10.5)
WBC # FLD AUTO: 14.63 K/UL — HIGH (ref 3.8–10.5)
WBC # FLD AUTO: 15.41 K/UL — HIGH (ref 3.8–10.5)
WBC # FLD AUTO: 15.57 K/UL — HIGH (ref 3.8–10.5)
WBC # FLD AUTO: 15.57 K/UL — HIGH (ref 3.8–10.5)
WBC # FLD AUTO: 17.17 K/UL — HIGH (ref 3.8–10.5)
WBC # FLD AUTO: 18.97 K/UL — HIGH (ref 3.8–10.5)
WBC # FLD AUTO: 19.67 K/UL — HIGH (ref 3.8–10.5)
WBC # FLD AUTO: 6.56 K/UL — SIGNIFICANT CHANGE UP (ref 3.8–10.5)
WBC # FLD AUTO: 7.02 K/UL — SIGNIFICANT CHANGE UP (ref 3.8–10.5)
WBC # FLD AUTO: 7.71 K/UL — SIGNIFICANT CHANGE UP (ref 3.8–10.5)
WBC # FLD AUTO: 7.73 K/UL — SIGNIFICANT CHANGE UP (ref 3.8–10.5)
WBC # FLD AUTO: 7.83 K/UL — SIGNIFICANT CHANGE UP (ref 3.8–10.5)
WBC # FLD AUTO: 7.98 K/UL — SIGNIFICANT CHANGE UP (ref 3.8–10.5)
WBC # FLD AUTO: 9.15 K/UL — SIGNIFICANT CHANGE UP (ref 3.8–10.5)
WBC # FLD AUTO: 9.35 K/UL — SIGNIFICANT CHANGE UP (ref 3.8–10.5)
WBC # FLD AUTO: 9.61 K/UL — SIGNIFICANT CHANGE UP (ref 3.8–10.5)
WBC CLUMPS # UR AUTO: PRESENT
WBC UR QL: 622 /HPF — HIGH (ref 0–5)
WBC UR QL: >50 /HPF — HIGH (ref 0–5)
YEAST-LIKE CELLS: PRESENT

## 2024-01-01 PROCEDURE — 71045 X-RAY EXAM CHEST 1 VIEW: CPT | Mod: 26

## 2024-01-01 PROCEDURE — 99285 EMERGENCY DEPT VISIT HI MDM: CPT

## 2024-01-01 PROCEDURE — 93971 EXTREMITY STUDY: CPT | Mod: 26

## 2024-01-01 PROCEDURE — 99223 1ST HOSP IP/OBS HIGH 75: CPT | Mod: GC

## 2024-01-01 PROCEDURE — 71045 X-RAY EXAM CHEST 1 VIEW: CPT | Mod: 26,77

## 2024-01-01 PROCEDURE — 74176 CT ABD & PELVIS W/O CONTRAST: CPT | Mod: 26,MC

## 2024-01-01 PROCEDURE — 95720 EEG PHY/QHP EA INCR W/VEEG: CPT

## 2024-01-01 PROCEDURE — 99291 CRITICAL CARE FIRST HOUR: CPT

## 2024-01-01 PROCEDURE — 74018 RADEX ABDOMEN 1 VIEW: CPT | Mod: 26

## 2024-01-01 PROCEDURE — 99223 1ST HOSP IP/OBS HIGH 75: CPT

## 2024-01-01 PROCEDURE — 99498 ADVNCD CARE PLAN ADDL 30 MIN: CPT | Mod: 25

## 2024-01-01 PROCEDURE — 99232 SBSQ HOSP IP/OBS MODERATE 35: CPT

## 2024-01-01 PROCEDURE — 74176 CT ABD & PELVIS W/O CONTRAST: CPT | Mod: 26

## 2024-01-01 PROCEDURE — 71250 CT THORAX DX C-: CPT | Mod: 26

## 2024-01-01 PROCEDURE — 99232 SBSQ HOSP IP/OBS MODERATE 35: CPT | Mod: GC

## 2024-01-01 PROCEDURE — 70450 CT HEAD/BRAIN W/O DYE: CPT | Mod: 26

## 2024-01-01 PROCEDURE — 70553 MRI BRAIN STEM W/O & W/DYE: CPT | Mod: 26

## 2024-01-01 PROCEDURE — 73620 X-RAY EXAM OF FOOT: CPT | Mod: 26,LT

## 2024-01-01 PROCEDURE — 99497 ADVNCD CARE PLAN 30 MIN: CPT | Mod: GC,25

## 2024-01-01 PROCEDURE — 71045 X-RAY EXAM CHEST 1 VIEW: CPT | Mod: 26,76

## 2024-01-01 PROCEDURE — 99233 SBSQ HOSP IP/OBS HIGH 50: CPT

## 2024-01-01 PROCEDURE — 99222 1ST HOSP IP/OBS MODERATE 55: CPT

## 2024-01-01 PROCEDURE — 95816 EEG AWAKE AND DROWSY: CPT | Mod: 26

## 2024-01-01 PROCEDURE — 70450 CT HEAD/BRAIN W/O DYE: CPT | Mod: 26,MC

## 2024-01-01 PROCEDURE — 93306 TTE W/DOPPLER COMPLETE: CPT | Mod: 26

## 2024-01-01 PROCEDURE — 93010 ELECTROCARDIOGRAM REPORT: CPT

## 2024-01-01 PROCEDURE — 95718 EEG PHYS/QHP 2-12 HR W/VEEG: CPT

## 2024-01-01 PROCEDURE — 99233 SBSQ HOSP IP/OBS HIGH 50: CPT | Mod: GC

## 2024-01-01 PROCEDURE — 31645 BRNCHSC W/THER ASPIR 1ST: CPT | Mod: GC

## 2024-01-01 PROCEDURE — 93280 PM DEVICE PROGR EVAL DUAL: CPT | Mod: 26

## 2024-01-01 PROCEDURE — 99291 CRITICAL CARE FIRST HOUR: CPT | Mod: 25

## 2024-01-01 PROCEDURE — 36569 INSJ PICC 5 YR+ W/O IMAGING: CPT

## 2024-01-01 PROCEDURE — 99497 ADVNCD CARE PLAN 30 MIN: CPT | Mod: 25

## 2024-01-01 PROCEDURE — 76937 US GUIDE VASCULAR ACCESS: CPT | Mod: 26,59

## 2024-01-01 PROCEDURE — 71250 CT THORAX DX C-: CPT | Mod: 26,MC

## 2024-01-01 RX ORDER — VALPROIC ACID 250 MG
500 CAPSULE ORAL EVERY 12 HOURS
Refills: 0 | Status: DISCONTINUED | OUTPATIENT
Start: 2024-01-01 | End: 2024-01-01

## 2024-01-01 RX ORDER — PANTOPRAZOLE SODIUM 20 MG/1
80 TABLET, DELAYED RELEASE ORAL ONCE
Refills: 0 | Status: COMPLETED | OUTPATIENT
Start: 2024-01-01 | End: 2024-01-01

## 2024-01-01 RX ORDER — VALPROIC ACID 250 MG
250 CAPSULE ORAL ONCE
Refills: 0 | Status: COMPLETED | OUTPATIENT
Start: 2024-01-01 | End: 2024-01-01

## 2024-01-01 RX ORDER — FENTANYL CITRATE 50 UG/ML
50 INJECTION INTRAMUSCULAR; INTRAVENOUS
Refills: 0 | Status: DISCONTINUED | OUTPATIENT
Start: 2024-01-01 | End: 2024-01-01

## 2024-01-01 RX ORDER — POTASSIUM CHLORIDE 10 MEQ
10 TABLET, EXT RELEASE, PARTICLES/CRYSTALS ORAL
Refills: 0 | Status: COMPLETED | OUTPATIENT
Start: 2024-01-01 | End: 2024-01-01

## 2024-01-01 RX ORDER — VALPROIC ACID 250 MG
500 CAPSULE ORAL EVERY 8 HOURS
Refills: 0 | Status: DISCONTINUED | OUTPATIENT
Start: 2024-01-01 | End: 2024-01-01

## 2024-01-01 RX ORDER — DEXTROSE 15 G/33 G
25 GEL IN PACKET (GRAM) ORAL ONCE
Refills: 0 | Status: COMPLETED | OUTPATIENT
Start: 2024-01-01 | End: 2024-01-01

## 2024-01-01 RX ORDER — QUETIAPINE FUMARATE 200 MG/1
25 TABLET, FILM COATED ORAL AT BEDTIME
Refills: 0 | Status: DISCONTINUED | OUTPATIENT
Start: 2024-01-01 | End: 2024-01-01

## 2024-01-01 RX ORDER — PIPERACILLIN SODIUM AND TAZOBACTAM SODIUM 3; .375 G/15ML; G/15ML
3.38 INJECTION, POWDER, FOR SOLUTION INTRAVENOUS ONCE
Refills: 0 | Status: COMPLETED | OUTPATIENT
Start: 2024-01-01 | End: 2024-01-01

## 2024-01-01 RX ORDER — FENTANYL CITRATE 50 UG/ML
50 INJECTION INTRAMUSCULAR; INTRAVENOUS ONCE
Refills: 0 | Status: DISCONTINUED | OUTPATIENT
Start: 2024-01-01 | End: 2024-01-01

## 2024-01-01 RX ORDER — POTASSIUM CHLORIDE 10 MEQ
40 TABLET, EXT RELEASE, PARTICLES/CRYSTALS ORAL ONCE
Refills: 0 | Status: COMPLETED | OUTPATIENT
Start: 2024-01-01 | End: 2024-01-01

## 2024-01-01 RX ORDER — INSULIN GLARGINE 100 [IU]/ML
8 INJECTION, SOLUTION SUBCUTANEOUS AT BEDTIME
Refills: 0 | Status: DISCONTINUED | OUTPATIENT
Start: 2024-01-01 | End: 2024-01-01

## 2024-01-01 RX ORDER — SENNA 187 MG
10 TABLET ORAL AT BEDTIME
Refills: 0 | Status: DISCONTINUED | OUTPATIENT
Start: 2024-01-01 | End: 2024-01-01

## 2024-01-01 RX ORDER — FUROSEMIDE 40 MG
40 TABLET ORAL ONCE
Refills: 0 | Status: COMPLETED | OUTPATIENT
Start: 2024-01-01 | End: 2024-01-01

## 2024-01-01 RX ORDER — DEXTROSE 15 G/33 G
25 GEL IN PACKET (GRAM) ORAL ONCE
Refills: 0 | Status: DISCONTINUED | OUTPATIENT
Start: 2024-01-01 | End: 2024-01-01

## 2024-01-01 RX ORDER — SENNA 187 MG
2 TABLET ORAL AT BEDTIME
Refills: 0 | Status: DISCONTINUED | OUTPATIENT
Start: 2024-01-01 | End: 2024-01-01

## 2024-01-01 RX ORDER — ACETAMINOPHEN 325 MG/1
650 TABLET ORAL EVERY 6 HOURS
Refills: 0 | Status: DISCONTINUED | OUTPATIENT
Start: 2024-01-01 | End: 2024-01-01

## 2024-01-01 RX ORDER — HYDROMORPHONE HYDROCHLORIDE 2 MG/ML
0.2 INJECTION INTRAMUSCULAR; INTRAVENOUS; SUBCUTANEOUS ONCE
Refills: 0 | Status: DISCONTINUED | OUTPATIENT
Start: 2024-01-01 | End: 2024-01-01

## 2024-01-01 RX ORDER — VALPROIC ACID 250 MG
250 CAPSULE ORAL EVERY 8 HOURS
Refills: 0 | Status: DISCONTINUED | OUTPATIENT
Start: 2024-01-01 | End: 2024-01-01

## 2024-01-01 RX ORDER — POVIDONE, PROPYLENE GLYCOL 6.8; 3 MG/ML; MG/ML
1 LIQUID OPHTHALMIC
Refills: 0 | Status: DISCONTINUED | OUTPATIENT
Start: 2024-01-01 | End: 2024-01-01

## 2024-01-01 RX ORDER — HYDROMORPHONE HYDROCHLORIDE 2 MG/1
0.5 TABLET ORAL ONCE
Refills: 0 | Status: DISCONTINUED | OUTPATIENT
Start: 2024-01-01 | End: 2024-01-01

## 2024-01-01 RX ORDER — VALPROIC ACID 250 MG
500 CAPSULE ORAL
Refills: 0 | Status: DISCONTINUED | OUTPATIENT
Start: 2024-01-01 | End: 2024-01-01

## 2024-01-01 RX ORDER — ENOXAPARIN SODIUM 100 MG/ML
30 INJECTION SUBCUTANEOUS EVERY 24 HOURS
Refills: 0 | Status: DISCONTINUED | OUTPATIENT
Start: 2024-01-01 | End: 2024-01-01

## 2024-01-01 RX ORDER — DEXTROSE 15 G/33 G
15 GEL IN PACKET (GRAM) ORAL ONCE
Refills: 0 | Status: DISCONTINUED | OUTPATIENT
Start: 2024-01-01 | End: 2024-01-01

## 2024-01-01 RX ORDER — ACETAMINOPHEN 325 MG/1
650 TABLET ORAL ONCE
Refills: 0 | Status: COMPLETED | OUTPATIENT
Start: 2024-01-01 | End: 2024-01-01

## 2024-01-01 RX ORDER — HYDROMORPHONE HYDROCHLORIDE 2 MG/1
0.2 TABLET ORAL
Refills: 0 | Status: DISCONTINUED | OUTPATIENT
Start: 2024-01-01 | End: 2024-01-01

## 2024-01-01 RX ORDER — HUMAN INSULIN 100 [IU]/ML
7 INJECTION, SUSPENSION SUBCUTANEOUS EVERY 6 HOURS
Refills: 0 | Status: DISCONTINUED | OUTPATIENT
Start: 2024-01-01 | End: 2024-01-01

## 2024-01-01 RX ORDER — POLYETHYLENE GLYCOL 3350 17 G/17G
17 POWDER, FOR SOLUTION ORAL DAILY
Refills: 0 | Status: DISCONTINUED | OUTPATIENT
Start: 2024-01-01 | End: 2024-01-01

## 2024-01-01 RX ORDER — ASPIRIN 81 MG
81 TABLET, DELAYED RELEASE (ENTERIC COATED) ORAL DAILY
Refills: 0 | Status: DISCONTINUED | OUTPATIENT
Start: 2024-01-01 | End: 2024-01-01

## 2024-01-01 RX ORDER — IPRATROPIUM BROMIDE AND ALBUTEROL SULFATE .5; 3 MG/3ML; MG/3ML
3 SOLUTION RESPIRATORY (INHALATION) EVERY 6 HOURS
Refills: 0 | Status: DISCONTINUED | OUTPATIENT
Start: 2024-01-01 | End: 2024-01-01

## 2024-01-01 RX ORDER — GLUCAGON INJECTION, SOLUTION 1 MG/.2ML
1 INJECTION, SOLUTION SUBCUTANEOUS ONCE
Refills: 0 | Status: DISCONTINUED | OUTPATIENT
Start: 2024-01-01 | End: 2024-01-01

## 2024-01-01 RX ORDER — ESCITALOPRAM OXALATE 10 MG/1
10 TABLET ORAL DAILY
Refills: 0 | Status: DISCONTINUED | OUTPATIENT
Start: 2024-01-01 | End: 2024-01-01

## 2024-01-01 RX ORDER — ACETAMINOPHEN 325 MG/1
1000 TABLET ORAL ONCE
Refills: 0 | Status: COMPLETED | OUTPATIENT
Start: 2024-01-01 | End: 2024-01-01

## 2024-01-01 RX ORDER — CHLORHEXIDINE GLUCONATE 40 MG/ML
15 SOLUTION TOPICAL EVERY 12 HOURS
Refills: 0 | Status: DISCONTINUED | OUTPATIENT
Start: 2024-01-01 | End: 2024-01-01

## 2024-01-01 RX ORDER — SODIUM CHLORIDE 9 MG/ML
10 INJECTION INTRAMUSCULAR; INTRAVENOUS; SUBCUTANEOUS
Refills: 0 | Status: DISCONTINUED | OUTPATIENT
Start: 2024-01-01 | End: 2024-01-01

## 2024-01-01 RX ORDER — TICAGRELOR 90 MG/1
60 TABLET ORAL EVERY 12 HOURS
Refills: 0 | Status: DISCONTINUED | OUTPATIENT
Start: 2024-01-01 | End: 2024-01-01

## 2024-01-01 RX ORDER — FENTANYL CITRATE 50 UG/ML
100 INJECTION INTRAMUSCULAR; INTRAVENOUS ONCE
Refills: 0 | Status: DISCONTINUED | OUTPATIENT
Start: 2024-01-01 | End: 2024-01-01

## 2024-01-01 RX ORDER — PROPOFOL 10 MG/ML
20 INJECTION, EMULSION INTRAVENOUS
Qty: 1000 | Refills: 0 | Status: DISCONTINUED | OUTPATIENT
Start: 2024-01-01 | End: 2024-01-01

## 2024-01-01 RX ORDER — CARVEDILOL 6.25 MG/1
6.25 TABLET ORAL EVERY 12 HOURS
Refills: 0 | Status: DISCONTINUED | OUTPATIENT
Start: 2024-01-01 | End: 2024-01-01

## 2024-01-01 RX ORDER — PIPERACILLIN SODIUM AND TAZOBACTAM SODIUM 3; .375 G/15ML; G/15ML
4.5 INJECTION, POWDER, FOR SOLUTION INTRAVENOUS EVERY 8 HOURS
Refills: 0 | Status: DISCONTINUED | OUTPATIENT
Start: 2024-01-01 | End: 2024-01-01

## 2024-01-01 RX ORDER — ZINC OXIDE 100 MG/G
1 OINTMENT TOPICAL
Refills: 0 | Status: DISCONTINUED | OUTPATIENT
Start: 2024-01-01 | End: 2024-01-01

## 2024-01-01 RX ORDER — INSULIN LISPRO 100/ML
VIAL (ML) SUBCUTANEOUS EVERY 6 HOURS
Refills: 0 | Status: DISCONTINUED | OUTPATIENT
Start: 2024-01-01 | End: 2024-01-03

## 2024-01-01 RX ORDER — DOXAZOSIN MESYLATE 1 MG
2 TABLET ORAL AT BEDTIME
Refills: 0 | Status: DISCONTINUED | OUTPATIENT
Start: 2024-01-01 | End: 2024-01-01

## 2024-01-01 RX ORDER — MIDODRINE HYDROCHLORIDE 5 MG/1
10 TABLET ORAL EVERY 8 HOURS
Refills: 0 | Status: DISCONTINUED | OUTPATIENT
Start: 2024-01-01 | End: 2024-01-01

## 2024-01-01 RX ORDER — CHLORHEXIDINE GLUCONATE 40 MG/ML
15 SOLUTION TOPICAL
Refills: 0 | Status: DISCONTINUED | OUTPATIENT
Start: 2024-01-01 | End: 2024-01-01

## 2024-01-01 RX ORDER — CHLORHEXIDINE GLUCONATE 40 MG/ML
1 SOLUTION TOPICAL DAILY
Refills: 0 | Status: DISCONTINUED | OUTPATIENT
Start: 2024-01-01 | End: 2024-01-01

## 2024-01-01 RX ORDER — PANTOPRAZOLE SODIUM 20 MG/1
40 TABLET, DELAYED RELEASE ORAL DAILY
Refills: 0 | Status: DISCONTINUED | OUTPATIENT
Start: 2024-01-01 | End: 2024-01-01

## 2024-01-01 RX ORDER — MODAFINIL 200 MG/1
100 TABLET ORAL
Refills: 0 | Status: DISCONTINUED | OUTPATIENT
Start: 2024-01-01 | End: 2024-01-01

## 2024-01-01 RX ORDER — POTASSIUM CHLORIDE 10 MEQ
10 TABLET, EXT RELEASE, PARTICLES/CRYSTALS ORAL
Refills: 0 | Status: DISCONTINUED | OUTPATIENT
Start: 2024-01-01 | End: 2024-01-01

## 2024-01-01 RX ORDER — HUMAN INSULIN 100 [IU]/ML
4 INJECTION, SUSPENSION SUBCUTANEOUS EVERY 6 HOURS
Refills: 0 | Status: DISCONTINUED | OUTPATIENT
Start: 2024-01-01 | End: 2024-01-01

## 2024-01-01 RX ORDER — HUMAN INSULIN 100 [IU]/ML
6 INJECTION, SUSPENSION SUBCUTANEOUS EVERY 6 HOURS
Refills: 0 | Status: DISCONTINUED | OUTPATIENT
Start: 2024-01-01 | End: 2024-01-01

## 2024-01-01 RX ORDER — SODIUM CHLORIDE 9 MG/ML
1000 INJECTION, SOLUTION INTRAVENOUS
Refills: 0 | Status: DISCONTINUED | OUTPATIENT
Start: 2024-01-01 | End: 2024-01-03

## 2024-01-01 RX ORDER — VALPROIC ACID 250 MG
125 CAPSULE ORAL THREE TIMES A DAY
Refills: 0 | Status: DISCONTINUED | OUTPATIENT
Start: 2024-01-01 | End: 2024-01-01

## 2024-01-01 RX ORDER — HEPARIN SODIUM 5000 [USP'U]/ML
5000 INJECTION INTRAVENOUS; SUBCUTANEOUS EVERY 8 HOURS
Refills: 0 | Status: DISCONTINUED | OUTPATIENT
Start: 2024-01-01 | End: 2024-01-02

## 2024-01-01 RX ORDER — INSULIN GLARGINE 100 [IU]/ML
10 INJECTION, SOLUTION SUBCUTANEOUS AT BEDTIME
Refills: 0 | Status: DISCONTINUED | OUTPATIENT
Start: 2024-01-01 | End: 2024-01-02

## 2024-01-01 RX ORDER — SODIUM CHLORIDE 9 MG/ML
4 INJECTION INTRAMUSCULAR; INTRAVENOUS; SUBCUTANEOUS EVERY 6 HOURS
Refills: 0 | Status: DISCONTINUED | OUTPATIENT
Start: 2024-01-01 | End: 2024-01-01

## 2024-01-01 RX ORDER — LORAZEPAM 4 MG/ML
0.5 INJECTION INTRAMUSCULAR; INTRAVENOUS
Refills: 0 | Status: DISCONTINUED | OUTPATIENT
Start: 2024-01-01 | End: 2024-01-01

## 2024-01-01 RX ORDER — PIPERACILLIN SODIUM AND TAZOBACTAM SODIUM 3; .375 G/15ML; G/15ML
3.38 INJECTION, POWDER, FOR SOLUTION INTRAVENOUS EVERY 8 HOURS
Refills: 0 | Status: DISCONTINUED | OUTPATIENT
Start: 2024-01-01 | End: 2024-01-01

## 2024-01-01 RX ORDER — VANCOMYCIN/0.9 % SOD CHLORIDE 1.75G/25
1250 PLASTIC BAG, INJECTION (ML) INTRAVENOUS ONCE
Refills: 0 | Status: COMPLETED | OUTPATIENT
Start: 2024-01-01 | End: 2024-01-01

## 2024-01-01 RX ORDER — DEXTROSE 15 G/33 G
12.5 GEL IN PACKET (GRAM) ORAL ONCE
Refills: 0 | Status: COMPLETED | OUTPATIENT
Start: 2024-01-01 | End: 2024-01-01

## 2024-01-01 RX ORDER — METOPROLOL TARTRATE 50 MG
5 TABLET ORAL EVERY 6 HOURS
Refills: 0 | Status: DISCONTINUED | OUTPATIENT
Start: 2024-01-01 | End: 2024-01-03

## 2024-01-01 RX ORDER — METOCLOPRAMIDE HCL 10 MG
5 TABLET ORAL EVERY 6 HOURS
Refills: 0 | Status: DISCONTINUED | OUTPATIENT
Start: 2024-01-01 | End: 2024-01-03

## 2024-01-01 RX ORDER — VALPROIC ACID 250 MG
250 CAPSULE ORAL THREE TIMES A DAY
Refills: 0 | Status: DISCONTINUED | OUTPATIENT
Start: 2024-01-01 | End: 2024-01-01

## 2024-01-01 RX ORDER — INSULIN GLARGINE 100 [IU]/ML
10 INJECTION, SOLUTION SUBCUTANEOUS AT BEDTIME
Refills: 0 | Status: DISCONTINUED | OUTPATIENT
Start: 2024-01-01 | End: 2024-01-01

## 2024-01-01 RX ORDER — LORAZEPAM 4 MG/ML
2 INJECTION INTRAMUSCULAR; INTRAVENOUS ONCE
Refills: 0 | Status: DISCONTINUED | OUTPATIENT
Start: 2024-01-01 | End: 2024-01-01

## 2024-01-01 RX ORDER — SODIUM CHLORIDE 9 MG/ML
2200 INJECTION INTRAMUSCULAR; INTRAVENOUS; SUBCUTANEOUS ONCE
Refills: 0 | Status: COMPLETED | OUTPATIENT
Start: 2024-01-01 | End: 2024-01-01

## 2024-01-01 RX ORDER — GLYCOPYRROLATE 0.2 MG/ML
0.4 INJECTION INTRAMUSCULAR; INTRAVENOUS
Refills: 0 | Status: DISCONTINUED | OUTPATIENT
Start: 2024-01-01 | End: 2024-01-01

## 2024-01-01 RX ORDER — ACETAMINOPHEN 500 MG
1000 TABLET ORAL EVERY 6 HOURS
Refills: 0 | Status: COMPLETED | OUTPATIENT
Start: 2024-01-01 | End: 2024-01-02

## 2024-01-01 RX ORDER — POTASSIUM CHLORIDE 10 MEQ
40 TABLET, EXT RELEASE, PARTICLES/CRYSTALS ORAL EVERY 4 HOURS
Refills: 0 | Status: DISCONTINUED | OUTPATIENT
Start: 2024-01-01 | End: 2024-01-01

## 2024-01-01 RX ORDER — TOBRAMYCIN 1200 MG/30ML
300 INJECTION, POWDER, FOR SOLUTION INTRAVENOUS EVERY 12 HOURS
Refills: 0 | Status: DISCONTINUED | OUTPATIENT
Start: 2024-01-01 | End: 2024-01-01

## 2024-01-01 RX ORDER — HEPARIN SODIUM,BOVINE 1000/ML
5000 VIAL (ML) INJECTION EVERY 8 HOURS
Refills: 0 | Status: DISCONTINUED | OUTPATIENT
Start: 2024-01-01 | End: 2024-01-01

## 2024-01-01 RX ORDER — CHLORHEXIDINE GLUCONATE 40 MG/ML
1 SOLUTION TOPICAL
Refills: 0 | Status: DISCONTINUED | OUTPATIENT
Start: 2024-01-01 | End: 2024-01-01

## 2024-01-01 RX ORDER — PANTOPRAZOLE SODIUM 40 MG
40 TABLET, DELAYED RELEASE (ENTERIC COATED) ORAL DAILY
Refills: 0 | Status: DISCONTINUED | OUTPATIENT
Start: 2024-01-01 | End: 2024-01-01

## 2024-01-01 RX ORDER — DEXTROSE 15 G/33 G
12.5 GEL IN PACKET (GRAM) ORAL ONCE
Refills: 0 | Status: DISCONTINUED | OUTPATIENT
Start: 2024-01-01 | End: 2024-01-01

## 2024-01-01 RX ORDER — LORAZEPAM 4 MG/ML
0.5 INJECTION INTRAMUSCULAR; INTRAVENOUS EVERY 4 HOURS
Refills: 0 | Status: DISCONTINUED | OUTPATIENT
Start: 2024-01-01 | End: 2024-01-01

## 2024-01-01 RX ORDER — PANTOPRAZOLE SODIUM 20 MG/1
8 TABLET, DELAYED RELEASE ORAL
Qty: 80 | Refills: 0 | Status: DISCONTINUED | OUTPATIENT
Start: 2024-01-01 | End: 2024-01-05

## 2024-01-01 RX ORDER — ASPIRIN/CALCIUM CARB/MAGNESIUM 324 MG
300 TABLET ORAL DAILY
Refills: 0 | Status: DISCONTINUED | OUTPATIENT
Start: 2024-01-01 | End: 2024-01-02

## 2024-01-01 RX ORDER — ENOXAPARIN SODIUM 100 MG/ML
40 INJECTION SUBCUTANEOUS EVERY 24 HOURS
Refills: 0 | Status: DISCONTINUED | OUTPATIENT
Start: 2024-01-01 | End: 2024-01-01

## 2024-01-01 RX ORDER — LEVETIRACETAM 1000 MG/1
500 TABLET ORAL ONCE
Refills: 0 | Status: COMPLETED | OUTPATIENT
Start: 2024-01-01 | End: 2024-01-01

## 2024-01-01 RX ORDER — HUMAN INSULIN 100 [IU]/ML
8 INJECTION, SUSPENSION SUBCUTANEOUS EVERY 6 HOURS
Refills: 0 | Status: DISCONTINUED | OUTPATIENT
Start: 2024-01-01 | End: 2024-01-01

## 2024-01-01 RX ORDER — HYDRALAZINE HCL 50 MG
10 TABLET ORAL ONCE
Refills: 0 | Status: COMPLETED | OUTPATIENT
Start: 2024-01-01 | End: 2024-01-01

## 2024-01-01 RX ORDER — DEXTROSE 15 G/33 G
50 GEL IN PACKET (GRAM) ORAL ONCE
Refills: 0 | Status: COMPLETED | OUTPATIENT
Start: 2024-01-01 | End: 2024-01-01

## 2024-01-01 RX ORDER — GLYCOPYRROLATE 0.2 MG/ML
0.4 INJECTION INTRAMUSCULAR; INTRAVENOUS EVERY 6 HOURS
Refills: 0 | Status: DISCONTINUED | OUTPATIENT
Start: 2024-01-01 | End: 2024-01-01

## 2024-01-01 RX ORDER — ROPIVACAINE IN 0.9% SOD CHL/PF 0.1 %
0.05 PLASTIC BAG, INJECTION (ML) EPIDURAL
Qty: 8 | Refills: 0 | Status: DISCONTINUED | OUTPATIENT
Start: 2024-01-01 | End: 2024-01-01

## 2024-01-01 RX ORDER — LEVETIRACETAM 250 MG/1
250 TABLET, FILM COATED ORAL EVERY 12 HOURS
Refills: 0 | Status: DISCONTINUED | OUTPATIENT
Start: 2024-01-01 | End: 2024-01-03

## 2024-01-01 RX ORDER — LEVETIRACETAM 1000 MG/1
500 TABLET ORAL
Refills: 0 | Status: DISCONTINUED | OUTPATIENT
Start: 2024-01-01 | End: 2024-01-01

## 2024-01-01 RX ORDER — LEVETIRACETAM 1000 MG/1
500 TABLET ORAL EVERY 12 HOURS
Refills: 0 | Status: DISCONTINUED | OUTPATIENT
Start: 2024-01-01 | End: 2024-01-01

## 2024-01-01 RX ORDER — TICAGRELOR 90 MG/1
60 TABLET ORAL
Refills: 0 | Status: DISCONTINUED | OUTPATIENT
Start: 2024-01-01 | End: 2024-01-01

## 2024-01-01 RX ORDER — FENTANYL CITRATE 50 UG/ML
50 INJECTION INTRAMUSCULAR; INTRAVENOUS ONCE
Refills: 0 | Status: ACTIVE | OUTPATIENT
Start: 2024-01-01 | End: 2024-01-01

## 2024-01-01 RX ORDER — ONDANSETRON 2 MG/ML
4 INJECTION, SOLUTION INTRAMUSCULAR; INTRAVENOUS EVERY 8 HOURS
Refills: 0 | Status: DISCONTINUED | OUTPATIENT
Start: 2024-01-01 | End: 2024-01-01

## 2024-01-01 RX ORDER — MIDAZOLAM HYDROCHLORIDE 5 MG/ML
4 INJECTION, SOLUTION INTRAMUSCULAR; INTRAVENOUS ONCE
Refills: 0 | Status: DISCONTINUED | OUTPATIENT
Start: 2024-01-01 | End: 2024-01-01

## 2024-01-01 RX ORDER — METOPROLOL TARTRATE 50 MG
50 TABLET ORAL
Refills: 0 | Status: DISCONTINUED | OUTPATIENT
Start: 2024-01-01 | End: 2024-01-01

## 2024-01-01 RX ORDER — MIDAZOLAM HYDROCHLORIDE 5 MG/ML
2 INJECTION, SOLUTION INTRAMUSCULAR; INTRAVENOUS ONCE
Refills: 0 | Status: DISCONTINUED | OUTPATIENT
Start: 2024-01-01 | End: 2024-01-01

## 2024-01-01 RX ORDER — DIATRIZOATE MEGLUMINE, SODIUM 66 %-10 %
30 VIAL (ML) INJECTION ONCE
Refills: 0 | Status: COMPLETED | OUTPATIENT
Start: 2024-01-01 | End: 2024-01-01

## 2024-01-01 RX ADMIN — POVIDONE, PROPYLENE GLYCOL 1 DROP(S): 6.8; 3 LIQUID OPHTHALMIC at 05:49

## 2024-01-01 RX ADMIN — ENOXAPARIN SODIUM 30 MILLIGRAM(S): 100 INJECTION SUBCUTANEOUS at 12:35

## 2024-01-01 RX ADMIN — POVIDONE, PROPYLENE GLYCOL 1 DROP(S): 6.8; 3 LIQUID OPHTHALMIC at 17:37

## 2024-01-01 RX ADMIN — RANOLAZINE 500 MILLIGRAM(S): 500 TABLET, FILM COATED, EXTENDED RELEASE ORAL at 06:37

## 2024-01-01 RX ADMIN — IPRATROPIUM BROMIDE AND ALBUTEROL SULFATE 3 MILLILITER(S): .5; 3 SOLUTION RESPIRATORY (INHALATION) at 05:36

## 2024-01-01 RX ADMIN — ACETAMINOPHEN 650 MILLIGRAM(S): 325 TABLET ORAL at 10:22

## 2024-01-01 RX ADMIN — CHLORHEXIDINE GLUCONATE 1 APPLICATION(S): 40 SOLUTION TOPICAL at 05:57

## 2024-01-01 RX ADMIN — INSULIN GLARGINE 10 UNIT(S): 100 INJECTION, SOLUTION SUBCUTANEOUS at 22:26

## 2024-01-01 RX ADMIN — PIPERACILLIN SODIUM AND TAZOBACTAM SODIUM 25 GRAM(S): 3; .375 INJECTION, POWDER, FOR SOLUTION INTRAVENOUS at 05:36

## 2024-01-01 RX ADMIN — Medication 81 MILLIGRAM(S): at 11:57

## 2024-01-01 RX ADMIN — MIDODRINE HYDROCHLORIDE 10 MILLIGRAM(S): 5 TABLET ORAL at 13:58

## 2024-01-01 RX ADMIN — Medication 40 MILLIEQUIVALENT(S): at 06:55

## 2024-01-01 RX ADMIN — SODIUM CHLORIDE 4 MILLILITER(S): 9 INJECTION INTRAMUSCULAR; INTRAVENOUS; SUBCUTANEOUS at 15:08

## 2024-01-01 RX ADMIN — IPRATROPIUM BROMIDE AND ALBUTEROL SULFATE 3 MILLILITER(S): .5; 3 SOLUTION RESPIRATORY (INHALATION) at 12:11

## 2024-01-01 RX ADMIN — LEVETIRACETAM 500 MILLIGRAM(S): 1000 TABLET ORAL at 05:14

## 2024-01-01 RX ADMIN — PIPERACILLIN SODIUM AND TAZOBACTAM SODIUM 25 GRAM(S): 3; .375 INJECTION, POWDER, FOR SOLUTION INTRAVENOUS at 22:26

## 2024-01-01 RX ADMIN — IPRATROPIUM BROMIDE AND ALBUTEROL SULFATE 3 MILLILITER(S): .5; 3 SOLUTION RESPIRATORY (INHALATION) at 00:28

## 2024-01-01 RX ADMIN — IPRATROPIUM BROMIDE AND ALBUTEROL SULFATE 3 MILLILITER(S): .5; 3 SOLUTION RESPIRATORY (INHALATION) at 06:02

## 2024-01-01 RX ADMIN — LEVETIRACETAM 500 MILLIGRAM(S): 1000 TABLET ORAL at 17:31

## 2024-01-01 RX ADMIN — PIPERACILLIN SODIUM AND TAZOBACTAM SODIUM 25 GRAM(S): 3; .375 INJECTION, POWDER, FOR SOLUTION INTRAVENOUS at 05:25

## 2024-01-01 RX ADMIN — POVIDONE, PROPYLENE GLYCOL 1 DROP(S): 6.8; 3 LIQUID OPHTHALMIC at 17:41

## 2024-01-01 RX ADMIN — CHLORHEXIDINE GLUCONATE 15 MILLILITER(S): 40 SOLUTION TOPICAL at 17:25

## 2024-01-01 RX ADMIN — CHLORHEXIDINE GLUCONATE 15 MILLILITER(S): 40 SOLUTION TOPICAL at 17:10

## 2024-01-01 RX ADMIN — HUMAN INSULIN 4 UNIT(S): 100 INJECTION, SUSPENSION SUBCUTANEOUS at 05:12

## 2024-01-01 RX ADMIN — ACETAMINOPHEN 650 MILLIGRAM(S): 325 TABLET ORAL at 00:00

## 2024-01-01 RX ADMIN — POVIDONE, PROPYLENE GLYCOL 1 DROP(S): 6.8; 3 LIQUID OPHTHALMIC at 23:23

## 2024-01-01 RX ADMIN — MIDODRINE HYDROCHLORIDE 10 MILLIGRAM(S): 5 TABLET ORAL at 05:11

## 2024-01-01 RX ADMIN — TOBRAMYCIN 300 MILLIGRAM(S): 1200 INJECTION, POWDER, FOR SOLUTION INTRAVENOUS at 08:10

## 2024-01-01 RX ADMIN — PIPERACILLIN SODIUM AND TAZOBACTAM SODIUM 25 GRAM(S): 3; .375 INJECTION, POWDER, FOR SOLUTION INTRAVENOUS at 21:03

## 2024-01-01 RX ADMIN — IPRATROPIUM BROMIDE AND ALBUTEROL SULFATE 3 MILLILITER(S): .5; 3 SOLUTION RESPIRATORY (INHALATION) at 11:12

## 2024-01-01 RX ADMIN — Medication 5000 UNIT(S): at 05:15

## 2024-01-01 RX ADMIN — Medication 100 MILLIGRAM(S): at 22:17

## 2024-01-01 RX ADMIN — PIPERACILLIN SODIUM AND TAZOBACTAM SODIUM 25 GRAM(S): 3; .375 INJECTION, POWDER, FOR SOLUTION INTRAVENOUS at 21:20

## 2024-01-01 RX ADMIN — POVIDONE, PROPYLENE GLYCOL 1 DROP(S): 6.8; 3 LIQUID OPHTHALMIC at 17:15

## 2024-01-01 RX ADMIN — IPRATROPIUM BROMIDE AND ALBUTEROL SULFATE 3 MILLILITER(S): .5; 3 SOLUTION RESPIRATORY (INHALATION) at 03:11

## 2024-01-01 RX ADMIN — ZINC OXIDE 1 APPLICATION(S): 100 OINTMENT TOPICAL at 17:21

## 2024-01-01 RX ADMIN — CHLORHEXIDINE GLUCONATE 15 MILLILITER(S): 40 SOLUTION TOPICAL at 05:15

## 2024-01-01 RX ADMIN — HYDROMORPHONE HYDROCHLORIDE 0.5 MILLIGRAM(S): 2 TABLET ORAL at 22:30

## 2024-01-01 RX ADMIN — IPRATROPIUM BROMIDE AND ALBUTEROL SULFATE 3 MILLILITER(S): .5; 3 SOLUTION RESPIRATORY (INHALATION) at 08:46

## 2024-01-01 RX ADMIN — CHLORHEXIDINE GLUCONATE 1 APPLICATION(S): 40 SOLUTION TOPICAL at 05:19

## 2024-01-01 RX ADMIN — PIPERACILLIN SODIUM AND TAZOBACTAM SODIUM 25 GRAM(S): 3; .375 INJECTION, POWDER, FOR SOLUTION INTRAVENOUS at 05:43

## 2024-01-01 RX ADMIN — IPRATROPIUM BROMIDE AND ALBUTEROL SULFATE 3 MILLILITER(S): .5; 3 SOLUTION RESPIRATORY (INHALATION) at 23:02

## 2024-01-01 RX ADMIN — Medication 2 MILLIGRAM(S): at 21:26

## 2024-01-01 RX ADMIN — SODIUM CHLORIDE 4 MILLILITER(S): 9 INJECTION INTRAMUSCULAR; INTRAVENOUS; SUBCUTANEOUS at 10:01

## 2024-01-01 RX ADMIN — IPRATROPIUM BROMIDE AND ALBUTEROL SULFATE 3 MILLILITER(S): .5; 3 SOLUTION RESPIRATORY (INHALATION) at 03:40

## 2024-01-01 RX ADMIN — ACETAMINOPHEN 650 MILLIGRAM(S): 325 TABLET ORAL at 22:20

## 2024-01-01 RX ADMIN — MIDODRINE HYDROCHLORIDE 10 MILLIGRAM(S): 5 TABLET ORAL at 05:42

## 2024-01-01 RX ADMIN — Medication 52.5 MILLIGRAM(S): at 05:59

## 2024-01-01 RX ADMIN — Medication 2: at 11:23

## 2024-01-01 RX ADMIN — ACETAMINOPHEN 650 MILLIGRAM(S): 325 TABLET ORAL at 17:30

## 2024-01-01 RX ADMIN — Medication 20 MILLIGRAM(S): at 21:57

## 2024-01-01 RX ADMIN — ZINC OXIDE 1 APPLICATION(S): 100 OINTMENT TOPICAL at 05:34

## 2024-01-01 RX ADMIN — ACETAMINOPHEN 650 MILLIGRAM(S): 325 TABLET ORAL at 23:02

## 2024-01-01 RX ADMIN — IPRATROPIUM BROMIDE AND ALBUTEROL SULFATE 3 MILLILITER(S): .5; 3 SOLUTION RESPIRATORY (INHALATION) at 15:30

## 2024-01-01 RX ADMIN — PIPERACILLIN SODIUM AND TAZOBACTAM SODIUM 25 GRAM(S): 3; .375 INJECTION, POWDER, FOR SOLUTION INTRAVENOUS at 14:05

## 2024-01-01 RX ADMIN — LEVETIRACETAM 500 MILLIGRAM(S): 1000 TABLET ORAL at 17:24

## 2024-01-01 RX ADMIN — IPRATROPIUM BROMIDE AND ALBUTEROL SULFATE 3 MILLILITER(S): .5; 3 SOLUTION RESPIRATORY (INHALATION) at 00:40

## 2024-01-01 RX ADMIN — Medication 40 MILLIGRAM(S): at 11:22

## 2024-01-01 RX ADMIN — Medication 500 MILLIGRAM(S): at 05:41

## 2024-01-01 RX ADMIN — POVIDONE, PROPYLENE GLYCOL 1 DROP(S): 6.8; 3 LIQUID OPHTHALMIC at 12:31

## 2024-01-01 RX ADMIN — Medication 1000 MILLILITER(S): at 01:17

## 2024-01-01 RX ADMIN — ESCITALOPRAM OXALATE 10 MILLIGRAM(S): 10 TABLET ORAL at 11:46

## 2024-01-01 RX ADMIN — ACETAMINOPHEN 650 MILLIGRAM(S): 325 TABLET ORAL at 10:29

## 2024-01-01 RX ADMIN — FENTANYL CITRATE 50 MICROGRAM(S): 50 INJECTION INTRAMUSCULAR; INTRAVENOUS at 08:22

## 2024-01-01 RX ADMIN — LEVETIRACETAM 500 MILLIGRAM(S): 1000 TABLET ORAL at 05:04

## 2024-01-01 RX ADMIN — SODIUM CHLORIDE 4 MILLILITER(S): 9 INJECTION INTRAMUSCULAR; INTRAVENOUS; SUBCUTANEOUS at 16:28

## 2024-01-01 RX ADMIN — Medication 4: at 23:59

## 2024-01-01 RX ADMIN — IPRATROPIUM BROMIDE AND ALBUTEROL SULFATE 3 MILLILITER(S): .5; 3 SOLUTION RESPIRATORY (INHALATION) at 11:34

## 2024-01-01 RX ADMIN — Medication 5 MILLIGRAM(S): at 23:59

## 2024-01-01 RX ADMIN — POVIDONE, PROPYLENE GLYCOL 1 APPLICATION(S): 6.8; 3 LIQUID OPHTHALMIC at 05:11

## 2024-01-01 RX ADMIN — Medication 125 MILLIGRAM(S): at 21:57

## 2024-01-01 RX ADMIN — Medication 10 MILLILITER(S): at 22:33

## 2024-01-01 RX ADMIN — SODIUM CHLORIDE 4 MILLILITER(S): 9 INJECTION INTRAMUSCULAR; INTRAVENOUS; SUBCUTANEOUS at 17:01

## 2024-01-01 RX ADMIN — SODIUM CHLORIDE 4 MILLILITER(S): 9 INJECTION INTRAMUSCULAR; INTRAVENOUS; SUBCUTANEOUS at 19:17

## 2024-01-01 RX ADMIN — PANTOPRAZOLE SODIUM 40 MILLIGRAM(S): 20 TABLET, DELAYED RELEASE ORAL at 12:01

## 2024-01-01 RX ADMIN — LEVETIRACETAM 500 MILLIGRAM(S): 1000 TABLET ORAL at 17:54

## 2024-01-01 RX ADMIN — POVIDONE, PROPYLENE GLYCOL 1 APPLICATION(S): 6.8; 3 LIQUID OPHTHALMIC at 06:10

## 2024-01-01 RX ADMIN — TICAGRELOR 60 MILLIGRAM(S): 90 TABLET ORAL at 18:06

## 2024-01-01 RX ADMIN — PANTOPRAZOLE SODIUM 80 MILLIGRAM(S): 20 TABLET, DELAYED RELEASE ORAL at 20:20

## 2024-01-01 RX ADMIN — Medication 2 MILLIGRAM(S): at 21:57

## 2024-01-01 RX ADMIN — Medication 5000 UNIT(S): at 13:37

## 2024-01-01 RX ADMIN — Medication 10 MILLILITER(S): at 21:26

## 2024-01-01 RX ADMIN — HUMAN INSULIN 8 UNIT(S): 100 INJECTION, SUSPENSION SUBCUTANEOUS at 00:19

## 2024-01-01 RX ADMIN — Medication 500 MILLIGRAM(S): at 17:14

## 2024-01-01 RX ADMIN — Medication 81 MILLIGRAM(S): at 11:22

## 2024-01-01 RX ADMIN — LEVETIRACETAM 500 MILLIGRAM(S): 1000 TABLET ORAL at 05:10

## 2024-01-01 RX ADMIN — Medication 5000 UNIT(S): at 06:06

## 2024-01-01 RX ADMIN — SODIUM CHLORIDE 4 MILLILITER(S): 9 INJECTION INTRAMUSCULAR; INTRAVENOUS; SUBCUTANEOUS at 05:16

## 2024-01-01 RX ADMIN — PIPERACILLIN SODIUM AND TAZOBACTAM SODIUM 25 GRAM(S): 3; .375 INJECTION, POWDER, FOR SOLUTION INTRAVENOUS at 05:52

## 2024-01-01 RX ADMIN — LEVETIRACETAM 420 MILLIGRAM(S): 1000 TABLET ORAL at 16:11

## 2024-01-01 RX ADMIN — IPRATROPIUM BROMIDE AND ALBUTEROL SULFATE 3 MILLILITER(S): .5; 3 SOLUTION RESPIRATORY (INHALATION) at 23:25

## 2024-01-01 RX ADMIN — Medication 4: at 17:38

## 2024-01-01 RX ADMIN — ZINC OXIDE 1 APPLICATION(S): 100 OINTMENT TOPICAL at 05:21

## 2024-01-01 RX ADMIN — ESCITALOPRAM OXALATE 10 MILLIGRAM(S): 10 TABLET ORAL at 11:10

## 2024-01-01 RX ADMIN — IPRATROPIUM BROMIDE AND ALBUTEROL SULFATE 3 MILLILITER(S): .5; 3 SOLUTION RESPIRATORY (INHALATION) at 15:00

## 2024-01-01 RX ADMIN — Medication 4: at 11:56

## 2024-01-01 RX ADMIN — MIDODRINE HYDROCHLORIDE 10 MILLIGRAM(S): 5 TABLET ORAL at 05:14

## 2024-01-01 RX ADMIN — Medication 10 MILLILITER(S): at 21:45

## 2024-01-01 RX ADMIN — CHLORHEXIDINE GLUCONATE 15 MILLILITER(S): 40 SOLUTION TOPICAL at 06:18

## 2024-01-01 RX ADMIN — HEPARIN SODIUM 5000 UNIT(S): 5000 INJECTION INTRAVENOUS; SUBCUTANEOUS at 06:37

## 2024-01-01 RX ADMIN — POLYETHYLENE GLYCOL 3350 17 GRAM(S): 17 POWDER, FOR SOLUTION ORAL at 11:15

## 2024-01-01 RX ADMIN — TICAGRELOR 60 MILLIGRAM(S): 90 TABLET ORAL at 17:37

## 2024-01-01 RX ADMIN — MODAFINIL 100 MILLIGRAM(S): 200 TABLET ORAL at 17:37

## 2024-01-01 RX ADMIN — FENTANYL CITRATE 50 MICROGRAM(S): 50 INJECTION INTRAMUSCULAR; INTRAVENOUS at 05:26

## 2024-01-01 RX ADMIN — TICAGRELOR 60 MILLIGRAM(S): 90 TABLET ORAL at 17:59

## 2024-01-01 RX ADMIN — Medication 4: at 05:59

## 2024-01-01 RX ADMIN — TICAGRELOR 60 MILLIGRAM(S): 90 TABLET ORAL at 05:33

## 2024-01-01 RX ADMIN — ZINC OXIDE 1 APPLICATION(S): 100 OINTMENT TOPICAL at 05:42

## 2024-01-01 RX ADMIN — MODAFINIL 100 MILLIGRAM(S): 200 TABLET ORAL at 06:18

## 2024-01-01 RX ADMIN — Medication 40 MILLIGRAM(S): at 11:47

## 2024-01-01 RX ADMIN — Medication 300 MILLIGRAM(S): at 12:02

## 2024-01-01 RX ADMIN — ACETAMINOPHEN 650 MILLIGRAM(S): 325 TABLET ORAL at 00:10

## 2024-01-01 RX ADMIN — Medication 40 MILLIGRAM(S): at 12:35

## 2024-01-01 RX ADMIN — Medication 2: at 05:51

## 2024-01-01 RX ADMIN — Medication 4: at 17:23

## 2024-01-01 RX ADMIN — POVIDONE, PROPYLENE GLYCOL 1 DROP(S): 6.8; 3 LIQUID OPHTHALMIC at 05:14

## 2024-01-01 RX ADMIN — HUMAN INSULIN 4 UNIT(S): 100 INJECTION, SUSPENSION SUBCUTANEOUS at 12:09

## 2024-01-01 RX ADMIN — Medication 5 MILLIGRAM(S): at 17:56

## 2024-01-01 RX ADMIN — Medication 2 MILLIGRAM(S): at 22:17

## 2024-01-01 RX ADMIN — TICAGRELOR 60 MILLIGRAM(S): 90 TABLET ORAL at 17:20

## 2024-01-01 RX ADMIN — HUMAN INSULIN 7 UNIT(S): 100 INJECTION, SUSPENSION SUBCUTANEOUS at 05:31

## 2024-01-01 RX ADMIN — Medication 5000 UNIT(S): at 05:21

## 2024-01-01 RX ADMIN — LEVETIRACETAM 500 MILLIGRAM(S): 1000 TABLET ORAL at 05:58

## 2024-01-01 RX ADMIN — POVIDONE, PROPYLENE GLYCOL 1 DROP(S): 6.8; 3 LIQUID OPHTHALMIC at 23:32

## 2024-01-01 RX ADMIN — HUMAN INSULIN 8 UNIT(S): 100 INJECTION, SUSPENSION SUBCUTANEOUS at 23:21

## 2024-01-01 RX ADMIN — ACETAMINOPHEN 650 MILLIGRAM(S): 325 TABLET ORAL at 10:01

## 2024-01-01 RX ADMIN — CHLORHEXIDINE GLUCONATE 15 MILLILITER(S): 40 SOLUTION TOPICAL at 17:15

## 2024-01-01 RX ADMIN — Medication 40 MILLIGRAM(S): at 23:18

## 2024-01-01 RX ADMIN — Medication 40 MILLIGRAM(S): at 11:51

## 2024-01-01 RX ADMIN — POVIDONE, PROPYLENE GLYCOL 1 DROP(S): 6.8; 3 LIQUID OPHTHALMIC at 17:45

## 2024-01-01 RX ADMIN — Medication 81 MILLIGRAM(S): at 11:34

## 2024-01-01 RX ADMIN — PIPERACILLIN SODIUM AND TAZOBACTAM SODIUM 25 GRAM(S): 3; .375 INJECTION, POWDER, FOR SOLUTION INTRAVENOUS at 05:46

## 2024-01-01 RX ADMIN — POVIDONE, PROPYLENE GLYCOL 1 DROP(S): 6.8; 3 LIQUID OPHTHALMIC at 23:18

## 2024-01-01 RX ADMIN — MIDODRINE HYDROCHLORIDE 10 MILLIGRAM(S): 5 TABLET ORAL at 13:34

## 2024-01-01 RX ADMIN — IPRATROPIUM BROMIDE AND ALBUTEROL SULFATE 3 MILLILITER(S): .5; 3 SOLUTION RESPIRATORY (INHALATION) at 15:20

## 2024-01-01 RX ADMIN — Medication 6: at 17:55

## 2024-01-01 RX ADMIN — Medication 2 MILLIGRAM(S): at 22:43

## 2024-01-01 RX ADMIN — SODIUM CHLORIDE 4 MILLILITER(S): 9 INJECTION INTRAMUSCULAR; INTRAVENOUS; SUBCUTANEOUS at 18:29

## 2024-01-01 RX ADMIN — Medication 50 MILLILITER(S): at 23:15

## 2024-01-01 RX ADMIN — LEVETIRACETAM 250 MILLIGRAM(S): 250 TABLET, FILM COATED ORAL at 17:56

## 2024-01-01 RX ADMIN — ZINC OXIDE 1 APPLICATION(S): 100 OINTMENT TOPICAL at 05:48

## 2024-01-01 RX ADMIN — LEVETIRACETAM 500 MILLIGRAM(S): 1000 TABLET ORAL at 17:10

## 2024-01-01 RX ADMIN — Medication 4: at 17:41

## 2024-01-01 RX ADMIN — Medication 2 MILLIGRAM(S): at 22:25

## 2024-01-01 RX ADMIN — Medication 400 MILLIGRAM(S): at 12:02

## 2024-01-01 RX ADMIN — HUMAN INSULIN 7 UNIT(S): 100 INJECTION, SUSPENSION SUBCUTANEOUS at 23:36

## 2024-01-01 RX ADMIN — SODIUM CHLORIDE 4 MILLILITER(S): 9 INJECTION INTRAMUSCULAR; INTRAVENOUS; SUBCUTANEOUS at 05:53

## 2024-01-01 RX ADMIN — Medication 40 MILLIGRAM(S): at 11:21

## 2024-01-01 RX ADMIN — TOBRAMYCIN 300 MILLIGRAM(S): 1200 INJECTION, POWDER, FOR SOLUTION INTRAVENOUS at 08:46

## 2024-01-01 RX ADMIN — IPRATROPIUM BROMIDE AND ALBUTEROL SULFATE 3 MILLILITER(S): .5; 3 SOLUTION RESPIRATORY (INHALATION) at 05:52

## 2024-01-01 RX ADMIN — MIDODRINE HYDROCHLORIDE 10 MILLIGRAM(S): 5 TABLET ORAL at 21:27

## 2024-01-01 RX ADMIN — CHLORHEXIDINE GLUCONATE 15 MILLILITER(S): 40 SOLUTION TOPICAL at 05:40

## 2024-01-01 RX ADMIN — MIDODRINE HYDROCHLORIDE 10 MILLIGRAM(S): 5 TABLET ORAL at 11:34

## 2024-01-01 RX ADMIN — MODAFINIL 100 MILLIGRAM(S): 200 TABLET ORAL at 18:48

## 2024-01-01 RX ADMIN — IPRATROPIUM BROMIDE AND ALBUTEROL SULFATE 3 MILLILITER(S): .5; 3 SOLUTION RESPIRATORY (INHALATION) at 17:02

## 2024-01-01 RX ADMIN — Medication 2 TABLET(S): at 21:27

## 2024-01-01 RX ADMIN — CHLORHEXIDINE GLUCONATE 1 APPLICATION(S): 40 SOLUTION TOPICAL at 14:04

## 2024-01-01 RX ADMIN — MODAFINIL 100 MILLIGRAM(S): 200 TABLET ORAL at 05:15

## 2024-01-01 RX ADMIN — POVIDONE, PROPYLENE GLYCOL 1 DROP(S): 6.8; 3 LIQUID OPHTHALMIC at 17:13

## 2024-01-01 RX ADMIN — FENTANYL CITRATE 50 MICROGRAM(S): 50 INJECTION INTRAMUSCULAR; INTRAVENOUS at 01:03

## 2024-01-01 RX ADMIN — PROPOFOL 8.86 MICROGRAM(S)/KG/MIN: 10 INJECTION, EMULSION INTRAVENOUS at 01:15

## 2024-01-01 RX ADMIN — LEVETIRACETAM 500 MILLIGRAM(S): 1000 TABLET ORAL at 05:43

## 2024-01-01 RX ADMIN — PIPERACILLIN SODIUM AND TAZOBACTAM SODIUM 25 GRAM(S): 3; .375 INJECTION, POWDER, FOR SOLUTION INTRAVENOUS at 21:42

## 2024-01-01 RX ADMIN — Medication 5000 UNIT(S): at 21:45

## 2024-01-01 RX ADMIN — ZINC OXIDE 1 APPLICATION(S): 100 OINTMENT TOPICAL at 05:22

## 2024-01-01 RX ADMIN — MIDAZOLAM HYDROCHLORIDE 4 MILLIGRAM(S): 5 INJECTION, SOLUTION INTRAMUSCULAR; INTRAVENOUS at 02:27

## 2024-01-01 RX ADMIN — SODIUM CHLORIDE 4 MILLILITER(S): 9 INJECTION INTRAMUSCULAR; INTRAVENOUS; SUBCUTANEOUS at 03:45

## 2024-01-01 RX ADMIN — Medication 100 MILLIGRAM(S): at 03:06

## 2024-01-01 RX ADMIN — Medication 100 MILLIGRAM(S): at 21:57

## 2024-01-01 RX ADMIN — Medication 4: at 00:19

## 2024-01-01 RX ADMIN — ACETAMINOPHEN 650 MILLIGRAM(S): 325 TABLET ORAL at 18:05

## 2024-01-01 RX ADMIN — Medication 500 MILLIGRAM(S): at 05:05

## 2024-01-01 RX ADMIN — Medication 2: at 11:32

## 2024-01-01 RX ADMIN — CHLORHEXIDINE GLUCONATE 15 MILLILITER(S): 40 SOLUTION TOPICAL at 06:05

## 2024-01-01 RX ADMIN — Medication 2: at 23:32

## 2024-01-01 RX ADMIN — LEVETIRACETAM 500 MILLIGRAM(S): 1000 TABLET ORAL at 18:06

## 2024-01-01 RX ADMIN — HEPARIN SODIUM 5000 UNIT(S): 5000 INJECTION INTRAVENOUS; SUBCUTANEOUS at 23:59

## 2024-01-01 RX ADMIN — SODIUM CHLORIDE 4 MILLILITER(S): 9 INJECTION INTRAMUSCULAR; INTRAVENOUS; SUBCUTANEOUS at 15:30

## 2024-01-01 RX ADMIN — MIDODRINE HYDROCHLORIDE 10 MILLIGRAM(S): 5 TABLET ORAL at 17:14

## 2024-01-01 RX ADMIN — FENTANYL CITRATE 50 MICROGRAM(S): 50 INJECTION INTRAMUSCULAR; INTRAVENOUS at 08:16

## 2024-01-01 RX ADMIN — IPRATROPIUM BROMIDE AND ALBUTEROL SULFATE 3 MILLILITER(S): .5; 3 SOLUTION RESPIRATORY (INHALATION) at 03:45

## 2024-01-01 RX ADMIN — CHLORHEXIDINE GLUCONATE 1 APPLICATION(S): 40 SOLUTION TOPICAL at 05:54

## 2024-01-01 RX ADMIN — MIDODRINE HYDROCHLORIDE 10 MILLIGRAM(S): 5 TABLET ORAL at 14:04

## 2024-01-01 RX ADMIN — FENTANYL CITRATE 50 MICROGRAM(S): 50 INJECTION INTRAMUSCULAR; INTRAVENOUS at 23:20

## 2024-01-01 RX ADMIN — Medication 500 MILLIGRAM(S): at 17:10

## 2024-01-01 RX ADMIN — Medication 52.5 MILLIGRAM(S): at 03:21

## 2024-01-01 RX ADMIN — Medication 2: at 12:02

## 2024-01-01 RX ADMIN — PIPERACILLIN SODIUM AND TAZOBACTAM SODIUM 25 GRAM(S): 3; .375 INJECTION, POWDER, FOR SOLUTION INTRAVENOUS at 05:14

## 2024-01-01 RX ADMIN — MIDODRINE HYDROCHLORIDE 10 MILLIGRAM(S): 5 TABLET ORAL at 05:13

## 2024-01-01 RX ADMIN — Medication 125 MILLIGRAM(S): at 17:32

## 2024-01-01 RX ADMIN — Medication 5000 UNIT(S): at 13:35

## 2024-01-01 RX ADMIN — Medication 4: at 17:49

## 2024-01-01 RX ADMIN — Medication 500 MILLIGRAM(S): at 17:37

## 2024-01-01 RX ADMIN — POLYETHYLENE GLYCOL 3350 17 GRAM(S): 17 POWDER, FOR SOLUTION ORAL at 11:48

## 2024-01-01 RX ADMIN — Medication 40 MILLIEQUIVALENT(S): at 10:46

## 2024-01-01 RX ADMIN — Medication 2 MILLIGRAM(S): at 03:40

## 2024-01-01 RX ADMIN — Medication 500 MILLIGRAM(S): at 05:04

## 2024-01-01 RX ADMIN — Medication 4: at 17:22

## 2024-01-01 RX ADMIN — Medication 125 MILLILITER(S): at 01:15

## 2024-01-01 RX ADMIN — ESCITALOPRAM OXALATE 10 MILLIGRAM(S): 10 TABLET ORAL at 11:20

## 2024-01-01 RX ADMIN — ACETAMINOPHEN 650 MILLIGRAM(S): 325 TABLET ORAL at 08:22

## 2024-01-01 RX ADMIN — Medication 40 MILLIGRAM(S): at 11:26

## 2024-01-01 RX ADMIN — ESCITALOPRAM OXALATE 10 MILLIGRAM(S): 10 TABLET ORAL at 13:43

## 2024-01-01 RX ADMIN — ACETAMINOPHEN 650 MILLIGRAM(S): 325 TABLET ORAL at 12:31

## 2024-01-01 RX ADMIN — MODAFINIL 100 MILLIGRAM(S): 200 TABLET ORAL at 05:51

## 2024-01-01 RX ADMIN — IPRATROPIUM BROMIDE AND ALBUTEROL SULFATE 3 MILLILITER(S): .5; 3 SOLUTION RESPIRATORY (INHALATION) at 03:10

## 2024-01-01 RX ADMIN — Medication 2: at 18:11

## 2024-01-01 RX ADMIN — IPRATROPIUM BROMIDE AND ALBUTEROL SULFATE 3 MILLILITER(S): .5; 3 SOLUTION RESPIRATORY (INHALATION) at 17:03

## 2024-01-01 RX ADMIN — Medication 4: at 05:12

## 2024-01-01 RX ADMIN — PIPERACILLIN SODIUM AND TAZOBACTAM SODIUM 25 GRAM(S): 3; .375 INJECTION, POWDER, FOR SOLUTION INTRAVENOUS at 21:26

## 2024-01-01 RX ADMIN — LEVETIRACETAM 500 MILLIGRAM(S): 1000 TABLET ORAL at 05:07

## 2024-01-01 RX ADMIN — ESCITALOPRAM OXALATE 10 MILLIGRAM(S): 10 TABLET ORAL at 11:09

## 2024-01-01 RX ADMIN — Medication 4: at 23:58

## 2024-01-01 RX ADMIN — TICAGRELOR 60 MILLIGRAM(S): 90 TABLET ORAL at 17:01

## 2024-01-01 RX ADMIN — Medication 2: at 05:52

## 2024-01-01 RX ADMIN — POVIDONE, PROPYLENE GLYCOL 1 DROP(S): 6.8; 3 LIQUID OPHTHALMIC at 00:19

## 2024-01-01 RX ADMIN — CHLORHEXIDINE GLUCONATE 15 MILLILITER(S): 40 SOLUTION TOPICAL at 17:54

## 2024-01-01 RX ADMIN — MIDODRINE HYDROCHLORIDE 10 MILLIGRAM(S): 5 TABLET ORAL at 22:10

## 2024-01-01 RX ADMIN — SODIUM CHLORIDE 4 MILLILITER(S): 9 INJECTION INTRAMUSCULAR; INTRAVENOUS; SUBCUTANEOUS at 08:47

## 2024-01-01 RX ADMIN — LEVETIRACETAM 500 MILLIGRAM(S): 1000 TABLET ORAL at 17:14

## 2024-01-01 RX ADMIN — Medication 6.78 MICROGRAM(S)/KG/MIN: at 10:15

## 2024-01-01 RX ADMIN — Medication 10 MILLILITER(S): at 21:46

## 2024-01-01 RX ADMIN — MIDODRINE HYDROCHLORIDE 10 MILLIGRAM(S): 5 TABLET ORAL at 14:11

## 2024-01-01 RX ADMIN — Medication 4: at 12:08

## 2024-01-01 RX ADMIN — CHLORHEXIDINE GLUCONATE 15 MILLILITER(S): 40 SOLUTION TOPICAL at 17:11

## 2024-01-01 RX ADMIN — POVIDONE, PROPYLENE GLYCOL 1 DROP(S): 6.8; 3 LIQUID OPHTHALMIC at 17:23

## 2024-01-01 RX ADMIN — TICAGRELOR 60 MILLIGRAM(S): 90 TABLET ORAL at 17:54

## 2024-01-01 RX ADMIN — Medication 2 MILLIGRAM(S): at 21:27

## 2024-01-01 RX ADMIN — IPRATROPIUM BROMIDE AND ALBUTEROL SULFATE 3 MILLILITER(S): .5; 3 SOLUTION RESPIRATORY (INHALATION) at 12:01

## 2024-01-01 RX ADMIN — Medication 6.78 MICROGRAM(S)/KG/MIN: at 19:02

## 2024-01-01 RX ADMIN — Medication 5000 UNIT(S): at 21:27

## 2024-01-01 RX ADMIN — POVIDONE, PROPYLENE GLYCOL 1 DROP(S): 6.8; 3 LIQUID OPHTHALMIC at 17:54

## 2024-01-01 RX ADMIN — ESCITALOPRAM OXALATE 10 MILLIGRAM(S): 10 TABLET ORAL at 11:21

## 2024-01-01 RX ADMIN — MIDODRINE HYDROCHLORIDE 10 MILLIGRAM(S): 5 TABLET ORAL at 05:05

## 2024-01-01 RX ADMIN — IPRATROPIUM BROMIDE AND ALBUTEROL SULFATE 3 MILLILITER(S): .5; 3 SOLUTION RESPIRATORY (INHALATION) at 11:20

## 2024-01-01 RX ADMIN — Medication 100 MILLIEQUIVALENT(S): at 07:05

## 2024-01-01 RX ADMIN — Medication 2: at 05:49

## 2024-01-01 RX ADMIN — CHLORHEXIDINE GLUCONATE 15 MILLILITER(S): 40 SOLUTION TOPICAL at 05:48

## 2024-01-01 RX ADMIN — Medication 6.78 MICROGRAM(S)/KG/MIN: at 01:23

## 2024-01-01 RX ADMIN — ACETAMINOPHEN 650 MILLIGRAM(S): 325 TABLET ORAL at 12:20

## 2024-01-01 RX ADMIN — Medication 2 MILLIGRAM(S): at 22:11

## 2024-01-01 RX ADMIN — IPRATROPIUM BROMIDE AND ALBUTEROL SULFATE 3 MILLILITER(S): .5; 3 SOLUTION RESPIRATORY (INHALATION) at 21:01

## 2024-01-01 RX ADMIN — CHLORHEXIDINE GLUCONATE 15 MILLILITER(S): 40 SOLUTION TOPICAL at 05:02

## 2024-01-01 RX ADMIN — HUMAN INSULIN 7 UNIT(S): 100 INJECTION, SUSPENSION SUBCUTANEOUS at 12:31

## 2024-01-01 RX ADMIN — Medication 81 MILLIGRAM(S): at 11:09

## 2024-01-01 RX ADMIN — FENTANYL CITRATE 50 MICROGRAM(S): 50 INJECTION INTRAMUSCULAR; INTRAVENOUS at 22:00

## 2024-01-01 RX ADMIN — Medication 6.78 MICROGRAM(S)/KG/MIN: at 19:33

## 2024-01-01 RX ADMIN — Medication 2: at 17:38

## 2024-01-01 RX ADMIN — IPRATROPIUM BROMIDE AND ALBUTEROL SULFATE 3 MILLILITER(S): .5; 3 SOLUTION RESPIRATORY (INHALATION) at 04:02

## 2024-01-01 RX ADMIN — POVIDONE, PROPYLENE GLYCOL 1 DROP(S): 6.8; 3 LIQUID OPHTHALMIC at 17:32

## 2024-01-01 RX ADMIN — ENOXAPARIN SODIUM 30 MILLIGRAM(S): 100 INJECTION SUBCUTANEOUS at 11:18

## 2024-01-01 RX ADMIN — ZINC OXIDE 1 APPLICATION(S): 100 OINTMENT TOPICAL at 05:14

## 2024-01-01 RX ADMIN — Medication 2 TABLET(S): at 21:42

## 2024-01-01 RX ADMIN — Medication 40 MILLIEQUIVALENT(S): at 06:18

## 2024-01-01 RX ADMIN — SODIUM CHLORIDE 4 MILLILITER(S): 9 INJECTION INTRAMUSCULAR; INTRAVENOUS; SUBCUTANEOUS at 18:33

## 2024-01-01 RX ADMIN — SODIUM CHLORIDE 2200 MILLILITER(S): 9 INJECTION INTRAMUSCULAR; INTRAVENOUS; SUBCUTANEOUS at 03:06

## 2024-01-01 RX ADMIN — LEVETIRACETAM 500 MILLIGRAM(S): 1000 TABLET ORAL at 05:21

## 2024-01-01 RX ADMIN — MIDODRINE HYDROCHLORIDE 10 MILLIGRAM(S): 5 TABLET ORAL at 22:43

## 2024-01-01 RX ADMIN — POVIDONE, PROPYLENE GLYCOL 1 APPLICATION(S): 6.8; 3 LIQUID OPHTHALMIC at 17:51

## 2024-01-01 RX ADMIN — LEVETIRACETAM 500 MILLIGRAM(S): 1000 TABLET ORAL at 05:31

## 2024-01-01 RX ADMIN — IPRATROPIUM BROMIDE AND ALBUTEROL SULFATE 3 MILLILITER(S): .5; 3 SOLUTION RESPIRATORY (INHALATION) at 15:15

## 2024-01-01 RX ADMIN — CHLORHEXIDINE GLUCONATE 15 MILLILITER(S): 40 SOLUTION TOPICAL at 17:33

## 2024-01-01 RX ADMIN — ENOXAPARIN SODIUM 30 MILLIGRAM(S): 100 INJECTION SUBCUTANEOUS at 11:22

## 2024-01-01 RX ADMIN — INSULIN GLARGINE 8 UNIT(S): 100 INJECTION, SOLUTION SUBCUTANEOUS at 23:25

## 2024-01-01 RX ADMIN — Medication 500 MILLIGRAM(S): at 05:42

## 2024-01-01 RX ADMIN — Medication 2 MILLIGRAM(S): at 21:45

## 2024-01-01 RX ADMIN — CHLORHEXIDINE GLUCONATE 15 MILLILITER(S): 40 SOLUTION TOPICAL at 06:02

## 2024-01-01 RX ADMIN — Medication 81 MILLIGRAM(S): at 12:30

## 2024-01-01 RX ADMIN — Medication 500 MILLIGRAM(S): at 17:54

## 2024-01-01 RX ADMIN — Medication 2 MILLIGRAM(S): at 16:50

## 2024-01-01 RX ADMIN — Medication 5000 UNIT(S): at 14:56

## 2024-01-01 RX ADMIN — MIDODRINE HYDROCHLORIDE 10 MILLIGRAM(S): 5 TABLET ORAL at 06:06

## 2024-01-01 RX ADMIN — LEVETIRACETAM 500 MILLIGRAM(S): 1000 TABLET ORAL at 05:23

## 2024-01-01 RX ADMIN — LEVETIRACETAM 500 MILLIGRAM(S): 1000 TABLET ORAL at 17:12

## 2024-01-01 RX ADMIN — Medication 2: at 23:23

## 2024-01-01 RX ADMIN — Medication 5000 UNIT(S): at 05:40

## 2024-01-01 RX ADMIN — ACETAMINOPHEN 400 MILLIGRAM(S): 325 TABLET ORAL at 13:32

## 2024-01-01 RX ADMIN — Medication 2 TABLET(S): at 21:30

## 2024-01-01 RX ADMIN — Medication 2: at 11:53

## 2024-01-01 RX ADMIN — POVIDONE, PROPYLENE GLYCOL 1 APPLICATION(S): 6.8; 3 LIQUID OPHTHALMIC at 17:55

## 2024-01-01 RX ADMIN — Medication 1 PATCH: at 15:36

## 2024-01-01 RX ADMIN — TICAGRELOR 60 MILLIGRAM(S): 90 TABLET ORAL at 05:50

## 2024-01-01 RX ADMIN — IPRATROPIUM BROMIDE AND ALBUTEROL SULFATE 3 MILLILITER(S): .5; 3 SOLUTION RESPIRATORY (INHALATION) at 17:06

## 2024-01-01 RX ADMIN — Medication 125 MILLIGRAM(S): at 06:06

## 2024-01-01 RX ADMIN — Medication 5000 UNIT(S): at 22:10

## 2024-01-01 RX ADMIN — GLYCOPYRROLATE 0.4 MILLIGRAM(S): 0.2 INJECTION INTRAMUSCULAR; INTRAVENOUS at 00:01

## 2024-01-01 RX ADMIN — PIPERACILLIN SODIUM AND TAZOBACTAM SODIUM 25 GRAM(S): 3; .375 INJECTION, POWDER, FOR SOLUTION INTRAVENOUS at 10:49

## 2024-01-01 RX ADMIN — ACETAMINOPHEN 650 MILLIGRAM(S): 325 TABLET ORAL at 00:02

## 2024-01-01 RX ADMIN — POVIDONE, PROPYLENE GLYCOL 1 DROP(S): 6.8; 3 LIQUID OPHTHALMIC at 05:47

## 2024-01-01 RX ADMIN — CHLORHEXIDINE GLUCONATE 15 MILLILITER(S): 40 SOLUTION TOPICAL at 05:21

## 2024-01-01 RX ADMIN — ENOXAPARIN SODIUM 30 MILLIGRAM(S): 100 INJECTION SUBCUTANEOUS at 11:20

## 2024-01-01 RX ADMIN — TICAGRELOR 60 MILLIGRAM(S): 90 TABLET ORAL at 06:03

## 2024-01-01 RX ADMIN — POLYETHYLENE GLYCOL 3350 17 GRAM(S): 17 POWDER, FOR SOLUTION ORAL at 12:30

## 2024-01-01 RX ADMIN — Medication 40 MILLIGRAM(S): at 12:23

## 2024-01-01 RX ADMIN — ESCITALOPRAM OXALATE 10 MILLIGRAM(S): 10 TABLET ORAL at 11:16

## 2024-01-01 RX ADMIN — HUMAN INSULIN 4 UNIT(S): 100 INJECTION, SUSPENSION SUBCUTANEOUS at 11:47

## 2024-01-01 RX ADMIN — Medication 500 MILLIGRAM(S): at 17:02

## 2024-01-01 RX ADMIN — MIDODRINE HYDROCHLORIDE 10 MILLIGRAM(S): 5 TABLET ORAL at 21:20

## 2024-01-01 RX ADMIN — TICAGRELOR 60 MILLIGRAM(S): 90 TABLET ORAL at 18:12

## 2024-01-01 RX ADMIN — PIPERACILLIN SODIUM AND TAZOBACTAM SODIUM 3.38 GRAM(S): 3; .375 INJECTION, POWDER, FOR SOLUTION INTRAVENOUS at 14:09

## 2024-01-01 RX ADMIN — CHLORHEXIDINE GLUCONATE 1 APPLICATION(S): 40 SOLUTION TOPICAL at 18:28

## 2024-01-01 RX ADMIN — Medication 100 MILLIEQUIVALENT(S): at 02:44

## 2024-01-01 RX ADMIN — Medication 6.78 MICROGRAM(S)/KG/MIN: at 11:58

## 2024-01-01 RX ADMIN — Medication 400 MILLIGRAM(S): at 17:56

## 2024-01-01 RX ADMIN — Medication 2 MILLIGRAM(S): at 05:00

## 2024-01-01 RX ADMIN — Medication 25 MILLILITER(S): at 18:00

## 2024-01-01 RX ADMIN — Medication 4: at 11:36

## 2024-01-01 RX ADMIN — HUMAN INSULIN 7 UNIT(S): 100 INJECTION, SUSPENSION SUBCUTANEOUS at 11:19

## 2024-01-01 RX ADMIN — IPRATROPIUM BROMIDE AND ALBUTEROL SULFATE 3 MILLILITER(S): .5; 3 SOLUTION RESPIRATORY (INHALATION) at 17:10

## 2024-01-01 RX ADMIN — HUMAN INSULIN 7 UNIT(S): 100 INJECTION, SUSPENSION SUBCUTANEOUS at 17:37

## 2024-01-01 RX ADMIN — MIDODRINE HYDROCHLORIDE 10 MILLIGRAM(S): 5 TABLET ORAL at 05:32

## 2024-01-01 RX ADMIN — CHLORHEXIDINE GLUCONATE 1 APPLICATION(S): 40 SOLUTION TOPICAL at 05:47

## 2024-01-01 RX ADMIN — SODIUM CHLORIDE 4 MILLILITER(S): 9 INJECTION INTRAMUSCULAR; INTRAVENOUS; SUBCUTANEOUS at 11:12

## 2024-01-01 RX ADMIN — SODIUM CHLORIDE 4 MILLILITER(S): 9 INJECTION INTRAMUSCULAR; INTRAVENOUS; SUBCUTANEOUS at 11:21

## 2024-01-01 RX ADMIN — ACETAMINOPHEN 650 MILLIGRAM(S): 325 TABLET ORAL at 22:43

## 2024-01-01 RX ADMIN — CHLORHEXIDINE GLUCONATE 15 MILLILITER(S): 40 SOLUTION TOPICAL at 05:10

## 2024-01-01 RX ADMIN — Medication 125 MILLIGRAM(S): at 05:40

## 2024-01-01 RX ADMIN — POVIDONE, PROPYLENE GLYCOL 1 DROP(S): 6.8; 3 LIQUID OPHTHALMIC at 23:03

## 2024-01-01 RX ADMIN — TICAGRELOR 60 MILLIGRAM(S): 90 TABLET ORAL at 17:40

## 2024-01-01 RX ADMIN — Medication 500 MILLIGRAM(S): at 05:36

## 2024-01-01 RX ADMIN — TICAGRELOR 60 MILLIGRAM(S): 90 TABLET ORAL at 05:42

## 2024-01-01 RX ADMIN — Medication 81 MILLIGRAM(S): at 11:37

## 2024-01-01 RX ADMIN — MIDAZOLAM HYDROCHLORIDE 2 MILLIGRAM(S): 5 INJECTION, SOLUTION INTRAMUSCULAR; INTRAVENOUS at 23:34

## 2024-01-01 RX ADMIN — MIDODRINE HYDROCHLORIDE 10 MILLIGRAM(S): 5 TABLET ORAL at 05:23

## 2024-01-01 RX ADMIN — Medication 6.78 MICROGRAM(S)/KG/MIN: at 19:12

## 2024-01-01 RX ADMIN — ZINC OXIDE 1 APPLICATION(S): 100 OINTMENT TOPICAL at 17:11

## 2024-01-01 RX ADMIN — SODIUM CHLORIDE 4 MILLILITER(S): 9 INJECTION INTRAMUSCULAR; INTRAVENOUS; SUBCUTANEOUS at 15:00

## 2024-01-01 RX ADMIN — MIDODRINE HYDROCHLORIDE 10 MILLIGRAM(S): 5 TABLET ORAL at 14:10

## 2024-01-01 RX ADMIN — TICAGRELOR 60 MILLIGRAM(S): 90 TABLET ORAL at 05:59

## 2024-01-01 RX ADMIN — POVIDONE, PROPYLENE GLYCOL 1 DROP(S): 6.8; 3 LIQUID OPHTHALMIC at 12:17

## 2024-01-01 RX ADMIN — ACETAMINOPHEN 650 MILLIGRAM(S): 325 TABLET ORAL at 05:45

## 2024-01-01 RX ADMIN — CHLORHEXIDINE GLUCONATE 15 MILLILITER(S): 40 SOLUTION TOPICAL at 05:14

## 2024-01-01 RX ADMIN — TICAGRELOR 60 MILLIGRAM(S): 90 TABLET ORAL at 05:10

## 2024-01-01 RX ADMIN — LEVETIRACETAM 500 MILLIGRAM(S): 1000 TABLET ORAL at 17:20

## 2024-01-01 RX ADMIN — Medication 2 TABLET(S): at 22:25

## 2024-01-01 RX ADMIN — Medication 5000 UNIT(S): at 21:05

## 2024-01-01 RX ADMIN — Medication 2 MILLIGRAM(S): at 21:20

## 2024-01-01 RX ADMIN — Medication 500 MILLIGRAM(S): at 05:14

## 2024-01-01 RX ADMIN — MIDODRINE HYDROCHLORIDE 10 MILLIGRAM(S): 5 TABLET ORAL at 14:56

## 2024-01-01 RX ADMIN — IPRATROPIUM BROMIDE AND ALBUTEROL SULFATE 3 MILLILITER(S): .5; 3 SOLUTION RESPIRATORY (INHALATION) at 06:39

## 2024-01-01 RX ADMIN — SODIUM CHLORIDE 4 MILLILITER(S): 9 INJECTION INTRAMUSCULAR; INTRAVENOUS; SUBCUTANEOUS at 00:09

## 2024-01-01 RX ADMIN — LEVETIRACETAM 250 MILLIGRAM(S): 250 TABLET, FILM COATED ORAL at 06:38

## 2024-01-01 RX ADMIN — POVIDONE, PROPYLENE GLYCOL 1 DROP(S): 6.8; 3 LIQUID OPHTHALMIC at 05:44

## 2024-01-01 RX ADMIN — Medication 5000 UNIT(S): at 22:43

## 2024-01-01 RX ADMIN — FENTANYL CITRATE 50 MICROGRAM(S): 50 INJECTION INTRAMUSCULAR; INTRAVENOUS at 06:04

## 2024-01-01 RX ADMIN — IPRATROPIUM BROMIDE AND ALBUTEROL SULFATE 3 MILLILITER(S): .5; 3 SOLUTION RESPIRATORY (INHALATION) at 23:45

## 2024-01-01 RX ADMIN — LEVETIRACETAM 500 MILLIGRAM(S): 1000 TABLET ORAL at 05:37

## 2024-01-01 RX ADMIN — MIDODRINE HYDROCHLORIDE 10 MILLIGRAM(S): 5 TABLET ORAL at 21:05

## 2024-01-01 RX ADMIN — IPRATROPIUM BROMIDE AND ALBUTEROL SULFATE 3 MILLILITER(S): .5; 3 SOLUTION RESPIRATORY (INHALATION) at 00:17

## 2024-01-01 RX ADMIN — ENOXAPARIN SODIUM 30 MILLIGRAM(S): 100 INJECTION SUBCUTANEOUS at 11:37

## 2024-01-01 RX ADMIN — ACETAMINOPHEN 650 MILLIGRAM(S): 325 TABLET ORAL at 15:41

## 2024-01-01 RX ADMIN — IPRATROPIUM BROMIDE AND ALBUTEROL SULFATE 3 MILLILITER(S): .5; 3 SOLUTION RESPIRATORY (INHALATION) at 12:28

## 2024-01-01 RX ADMIN — Medication 81 MILLIGRAM(S): at 11:56

## 2024-01-01 RX ADMIN — Medication 81 MILLIGRAM(S): at 11:15

## 2024-01-01 RX ADMIN — TICAGRELOR 60 MILLIGRAM(S): 90 TABLET ORAL at 17:14

## 2024-01-01 RX ADMIN — Medication 2 MILLIGRAM(S): at 21:42

## 2024-01-01 RX ADMIN — SODIUM CHLORIDE 4 MILLILITER(S): 9 INJECTION INTRAMUSCULAR; INTRAVENOUS; SUBCUTANEOUS at 11:10

## 2024-01-01 RX ADMIN — IPRATROPIUM BROMIDE AND ALBUTEROL SULFATE 3 MILLILITER(S): .5; 3 SOLUTION RESPIRATORY (INHALATION) at 22:59

## 2024-01-01 RX ADMIN — ESCITALOPRAM OXALATE 10 MILLIGRAM(S): 10 TABLET ORAL at 12:36

## 2024-01-01 RX ADMIN — Medication 100 MILLILITER(S): at 22:17

## 2024-01-01 RX ADMIN — IPRATROPIUM BROMIDE AND ALBUTEROL SULFATE 3 MILLILITER(S): .5; 3 SOLUTION RESPIRATORY (INHALATION) at 16:28

## 2024-01-01 RX ADMIN — Medication 2: at 05:13

## 2024-01-01 RX ADMIN — POVIDONE, PROPYLENE GLYCOL 1 DROP(S): 6.8; 3 LIQUID OPHTHALMIC at 11:22

## 2024-01-01 RX ADMIN — POVIDONE, PROPYLENE GLYCOL 1 APPLICATION(S): 6.8; 3 LIQUID OPHTHALMIC at 05:42

## 2024-01-01 RX ADMIN — PIPERACILLIN SODIUM AND TAZOBACTAM SODIUM 25 GRAM(S): 3; .375 INJECTION, POWDER, FOR SOLUTION INTRAVENOUS at 13:43

## 2024-01-01 RX ADMIN — ZINC OXIDE 1 APPLICATION(S): 100 OINTMENT TOPICAL at 17:46

## 2024-01-01 RX ADMIN — PIPERACILLIN SODIUM AND TAZOBACTAM SODIUM 25 GRAM(S): 3; .375 INJECTION, POWDER, FOR SOLUTION INTRAVENOUS at 06:02

## 2024-01-01 RX ADMIN — Medication 81 MILLIGRAM(S): at 11:47

## 2024-01-01 RX ADMIN — CARVEDILOL 6.25 MILLIGRAM(S): 6.25 TABLET ORAL at 05:53

## 2024-01-01 RX ADMIN — POVIDONE, PROPYLENE GLYCOL 1 DROP(S): 6.8; 3 LIQUID OPHTHALMIC at 00:32

## 2024-01-01 RX ADMIN — Medication 3: at 19:04

## 2024-01-01 RX ADMIN — Medication 6.78 MICROGRAM(S)/KG/MIN: at 07:22

## 2024-01-01 RX ADMIN — Medication 2: at 05:33

## 2024-01-01 RX ADMIN — ZINC OXIDE 1 APPLICATION(S): 100 OINTMENT TOPICAL at 05:11

## 2024-01-01 RX ADMIN — POVIDONE, PROPYLENE GLYCOL 1 DROP(S): 6.8; 3 LIQUID OPHTHALMIC at 06:03

## 2024-01-01 RX ADMIN — CHLORHEXIDINE GLUCONATE 1 APPLICATION(S): 40 SOLUTION TOPICAL at 11:27

## 2024-01-01 RX ADMIN — ACETAMINOPHEN 650 MILLIGRAM(S): 325 TABLET ORAL at 21:11

## 2024-01-01 RX ADMIN — Medication 100 MILLIEQUIVALENT(S): at 07:23

## 2024-01-01 RX ADMIN — LEVETIRACETAM 500 MILLIGRAM(S): 1000 TABLET ORAL at 17:21

## 2024-01-01 RX ADMIN — ACETAMINOPHEN 650 MILLIGRAM(S): 325 TABLET ORAL at 14:04

## 2024-01-01 RX ADMIN — Medication 100 MILLIEQUIVALENT(S): at 01:31

## 2024-01-01 RX ADMIN — Medication 5000 UNIT(S): at 05:32

## 2024-01-01 RX ADMIN — CHLORHEXIDINE GLUCONATE 1 APPLICATION(S): 40 SOLUTION TOPICAL at 11:21

## 2024-01-01 RX ADMIN — ESCITALOPRAM OXALATE 10 MILLIGRAM(S): 10 TABLET ORAL at 11:26

## 2024-01-01 RX ADMIN — PIPERACILLIN SODIUM AND TAZOBACTAM SODIUM 25 GRAM(S): 3; .375 INJECTION, POWDER, FOR SOLUTION INTRAVENOUS at 03:57

## 2024-01-01 RX ADMIN — IPRATROPIUM BROMIDE AND ALBUTEROL SULFATE 3 MILLILITER(S): .5; 3 SOLUTION RESPIRATORY (INHALATION) at 03:03

## 2024-01-01 RX ADMIN — Medication 5000 UNIT(S): at 22:11

## 2024-01-01 RX ADMIN — Medication 50 MILLILITER(S): at 09:33

## 2024-01-01 RX ADMIN — PANTOPRAZOLE SODIUM 10 MG/HR: 20 TABLET, DELAYED RELEASE ORAL at 20:23

## 2024-01-01 RX ADMIN — ENOXAPARIN SODIUM 30 MILLIGRAM(S): 100 INJECTION SUBCUTANEOUS at 12:30

## 2024-01-01 RX ADMIN — Medication 2 MILLIGRAM(S): at 22:34

## 2024-01-01 RX ADMIN — ACETAMINOPHEN 650 MILLIGRAM(S): 325 TABLET ORAL at 23:43

## 2024-01-01 RX ADMIN — CHLORHEXIDINE GLUCONATE 15 MILLILITER(S): 40 SOLUTION TOPICAL at 17:40

## 2024-01-01 RX ADMIN — TICAGRELOR 60 MILLIGRAM(S): 90 TABLET ORAL at 06:48

## 2024-01-01 RX ADMIN — CHLORHEXIDINE GLUCONATE 15 MILLILITER(S): 40 SOLUTION TOPICAL at 17:24

## 2024-01-01 RX ADMIN — HUMAN INSULIN 7 UNIT(S): 100 INJECTION, SUSPENSION SUBCUTANEOUS at 17:54

## 2024-01-01 RX ADMIN — SODIUM CHLORIDE 4 MILLILITER(S): 9 INJECTION INTRAMUSCULAR; INTRAVENOUS; SUBCUTANEOUS at 21:01

## 2024-01-01 RX ADMIN — Medication 500 MILLIGRAM(S): at 05:21

## 2024-01-01 RX ADMIN — Medication 2 MILLIGRAM(S): at 06:26

## 2024-01-01 RX ADMIN — Medication 52.5 MILLIGRAM(S): at 13:44

## 2024-01-01 RX ADMIN — Medication 5: at 18:13

## 2024-01-01 RX ADMIN — POVIDONE, PROPYLENE GLYCOL 1 DROP(S): 6.8; 3 LIQUID OPHTHALMIC at 23:47

## 2024-01-01 RX ADMIN — ACETAMINOPHEN 650 MILLIGRAM(S): 325 TABLET ORAL at 20:11

## 2024-01-01 RX ADMIN — HUMAN INSULIN 4 UNIT(S): 100 INJECTION, SUSPENSION SUBCUTANEOUS at 23:57

## 2024-01-01 RX ADMIN — Medication 6.78 MICROGRAM(S)/KG/MIN: at 03:06

## 2024-01-01 RX ADMIN — HUMAN INSULIN 4 UNIT(S): 100 INJECTION, SUSPENSION SUBCUTANEOUS at 17:13

## 2024-01-01 RX ADMIN — PIPERACILLIN SODIUM AND TAZOBACTAM SODIUM 25 GRAM(S): 3; .375 INJECTION, POWDER, FOR SOLUTION INTRAVENOUS at 05:23

## 2024-01-01 RX ADMIN — MIDODRINE HYDROCHLORIDE 10 MILLIGRAM(S): 5 TABLET ORAL at 13:35

## 2024-01-01 RX ADMIN — POVIDONE, PROPYLENE GLYCOL 1 DROP(S): 6.8; 3 LIQUID OPHTHALMIC at 18:14

## 2024-01-01 RX ADMIN — CHLORHEXIDINE GLUCONATE 15 MILLILITER(S): 40 SOLUTION TOPICAL at 05:59

## 2024-01-01 RX ADMIN — HUMAN INSULIN 8 UNIT(S): 100 INJECTION, SUSPENSION SUBCUTANEOUS at 11:57

## 2024-01-01 RX ADMIN — LORAZEPAM 2 MILLIGRAM(S): 4 INJECTION INTRAMUSCULAR; INTRAVENOUS at 00:51

## 2024-01-01 RX ADMIN — MIDODRINE HYDROCHLORIDE 10 MILLIGRAM(S): 5 TABLET ORAL at 05:46

## 2024-01-01 RX ADMIN — Medication 55 MILLIGRAM(S): at 05:31

## 2024-01-01 RX ADMIN — Medication 1 GRAM(S): at 06:37

## 2024-01-01 RX ADMIN — MIDODRINE HYDROCHLORIDE 10 MILLIGRAM(S): 5 TABLET ORAL at 21:30

## 2024-01-01 RX ADMIN — Medication 4: at 17:13

## 2024-01-01 RX ADMIN — IPRATROPIUM BROMIDE AND ALBUTEROL SULFATE 3 MILLILITER(S): .5; 3 SOLUTION RESPIRATORY (INHALATION) at 11:10

## 2024-01-01 RX ADMIN — PIPERACILLIN SODIUM AND TAZOBACTAM SODIUM 25 GRAM(S): 3; .375 INJECTION, POWDER, FOR SOLUTION INTRAVENOUS at 22:01

## 2024-01-01 RX ADMIN — HUMAN INSULIN 8 UNIT(S): 100 INJECTION, SUSPENSION SUBCUTANEOUS at 11:31

## 2024-01-01 RX ADMIN — CHLORHEXIDINE GLUCONATE 1 APPLICATION(S): 40 SOLUTION TOPICAL at 11:49

## 2024-01-01 RX ADMIN — SODIUM CHLORIDE 4 MILLILITER(S): 9 INJECTION INTRAMUSCULAR; INTRAVENOUS; SUBCUTANEOUS at 03:36

## 2024-01-01 RX ADMIN — Medication 2: at 00:34

## 2024-01-01 RX ADMIN — Medication 4: at 23:24

## 2024-01-01 RX ADMIN — Medication 5000 UNIT(S): at 13:58

## 2024-01-01 RX ADMIN — HUMAN INSULIN 7 UNIT(S): 100 INJECTION, SUSPENSION SUBCUTANEOUS at 17:10

## 2024-01-01 RX ADMIN — POVIDONE, PROPYLENE GLYCOL 1 DROP(S): 6.8; 3 LIQUID OPHTHALMIC at 12:23

## 2024-01-01 RX ADMIN — IPRATROPIUM BROMIDE AND ALBUTEROL SULFATE 3 MILLILITER(S): .5; 3 SOLUTION RESPIRATORY (INHALATION) at 05:16

## 2024-01-01 RX ADMIN — Medication 500 MILLIGRAM(S): at 05:46

## 2024-01-01 RX ADMIN — Medication 4: at 05:44

## 2024-01-01 RX ADMIN — MODAFINIL 100 MILLIGRAM(S): 200 TABLET ORAL at 18:06

## 2024-01-01 RX ADMIN — TICAGRELOR 60 MILLIGRAM(S): 90 TABLET ORAL at 05:15

## 2024-01-01 RX ADMIN — Medication 2 MILLIGRAM(S): at 08:37

## 2024-01-01 RX ADMIN — POVIDONE, PROPYLENE GLYCOL 1 DROP(S): 6.8; 3 LIQUID OPHTHALMIC at 11:36

## 2024-01-01 RX ADMIN — PIPERACILLIN SODIUM AND TAZOBACTAM SODIUM 25 GRAM(S): 3; .375 INJECTION, POWDER, FOR SOLUTION INTRAVENOUS at 05:19

## 2024-01-01 RX ADMIN — Medication 125 MILLIGRAM(S): at 13:37

## 2024-01-01 RX ADMIN — ENOXAPARIN SODIUM 30 MILLIGRAM(S): 100 INJECTION SUBCUTANEOUS at 11:47

## 2024-01-01 RX ADMIN — SODIUM CHLORIDE 4 MILLILITER(S): 9 INJECTION INTRAMUSCULAR; INTRAVENOUS; SUBCUTANEOUS at 06:40

## 2024-01-01 RX ADMIN — ACETAMINOPHEN 650 MILLIGRAM(S): 325 TABLET ORAL at 11:01

## 2024-01-01 RX ADMIN — Medication 125 MILLIGRAM(S): at 13:51

## 2024-01-01 RX ADMIN — MIDODRINE HYDROCHLORIDE 10 MILLIGRAM(S): 5 TABLET ORAL at 05:41

## 2024-01-01 RX ADMIN — SODIUM CHLORIDE 4 MILLILITER(S): 9 INJECTION INTRAMUSCULAR; INTRAVENOUS; SUBCUTANEOUS at 11:00

## 2024-01-01 RX ADMIN — POLYETHYLENE GLYCOL 3350 17 GRAM(S): 17 POWDER, FOR SOLUTION ORAL at 11:09

## 2024-01-01 RX ADMIN — SODIUM CHLORIDE 4 MILLILITER(S): 9 INJECTION INTRAMUSCULAR; INTRAVENOUS; SUBCUTANEOUS at 11:14

## 2024-01-01 RX ADMIN — Medication 125 MILLIGRAM(S): at 22:43

## 2024-01-01 RX ADMIN — LEVETIRACETAM 500 MILLIGRAM(S): 1000 TABLET ORAL at 06:06

## 2024-01-01 RX ADMIN — Medication 10 MILLILITER(S): at 22:11

## 2024-01-01 RX ADMIN — SODIUM CHLORIDE 4 MILLILITER(S): 9 INJECTION INTRAMUSCULAR; INTRAVENOUS; SUBCUTANEOUS at 06:03

## 2024-01-01 RX ADMIN — ZINC OXIDE 1 APPLICATION(S): 100 OINTMENT TOPICAL at 17:55

## 2024-01-01 RX ADMIN — Medication 5 MILLIGRAM(S): at 12:02

## 2024-01-01 RX ADMIN — Medication 500 MILLIGRAM(S): at 05:58

## 2024-01-01 RX ADMIN — ACETAMINOPHEN 650 MILLIGRAM(S): 325 TABLET ORAL at 18:52

## 2024-01-01 RX ADMIN — Medication 50 MILLILITER(S): at 06:15

## 2024-01-01 RX ADMIN — Medication 500 MILLIGRAM(S): at 18:06

## 2024-01-01 RX ADMIN — PIPERACILLIN SODIUM AND TAZOBACTAM SODIUM 25 GRAM(S): 3; .375 INJECTION, POWDER, FOR SOLUTION INTRAVENOUS at 13:10

## 2024-01-01 RX ADMIN — CHLORHEXIDINE GLUCONATE 15 MILLILITER(S): 40 SOLUTION TOPICAL at 17:43

## 2024-01-01 RX ADMIN — Medication 10 MILLILITER(S): at 22:44

## 2024-01-01 RX ADMIN — TICAGRELOR 60 MILLIGRAM(S): 90 TABLET ORAL at 05:52

## 2024-01-01 RX ADMIN — ACETAMINOPHEN 650 MILLIGRAM(S): 325 TABLET ORAL at 06:17

## 2024-01-01 RX ADMIN — Medication 250 MILLIGRAM(S): at 22:17

## 2024-01-01 RX ADMIN — POVIDONE, PROPYLENE GLYCOL 1 APPLICATION(S): 6.8; 3 LIQUID OPHTHALMIC at 17:19

## 2024-01-01 RX ADMIN — Medication 6.78 MICROGRAM(S)/KG/MIN: at 07:27

## 2024-01-01 RX ADMIN — HUMAN INSULIN 7 UNIT(S): 100 INJECTION, SUSPENSION SUBCUTANEOUS at 11:49

## 2024-01-01 RX ADMIN — IPRATROPIUM BROMIDE AND ALBUTEROL SULFATE 3 MILLILITER(S): .5; 3 SOLUTION RESPIRATORY (INHALATION) at 18:29

## 2024-01-01 RX ADMIN — PIPERACILLIN SODIUM AND TAZOBACTAM SODIUM 25 GRAM(S): 3; .375 INJECTION, POWDER, FOR SOLUTION INTRAVENOUS at 14:11

## 2024-01-01 RX ADMIN — MIDODRINE HYDROCHLORIDE 10 MILLIGRAM(S): 5 TABLET ORAL at 14:36

## 2024-01-01 RX ADMIN — POVIDONE, PROPYLENE GLYCOL 1 APPLICATION(S): 6.8; 3 LIQUID OPHTHALMIC at 18:06

## 2024-01-01 RX ADMIN — Medication 40 MILLIGRAM(S): at 11:14

## 2024-01-01 RX ADMIN — MIDODRINE HYDROCHLORIDE 10 MILLIGRAM(S): 5 TABLET ORAL at 13:10

## 2024-01-01 RX ADMIN — HUMAN INSULIN 8 UNIT(S): 100 INJECTION, SUSPENSION SUBCUTANEOUS at 17:24

## 2024-01-01 RX ADMIN — SODIUM CHLORIDE 4 MILLILITER(S): 9 INJECTION INTRAMUSCULAR; INTRAVENOUS; SUBCUTANEOUS at 03:11

## 2024-01-01 RX ADMIN — Medication 166.67 MILLIGRAM(S): at 12:52

## 2024-01-01 RX ADMIN — MODAFINIL 100 MILLIGRAM(S): 200 TABLET ORAL at 17:10

## 2024-01-01 RX ADMIN — POVIDONE, PROPYLENE GLYCOL 1 APPLICATION(S): 6.8; 3 LIQUID OPHTHALMIC at 17:33

## 2024-01-01 RX ADMIN — IPRATROPIUM BROMIDE AND ALBUTEROL SULFATE 3 MILLILITER(S): .5; 3 SOLUTION RESPIRATORY (INHALATION) at 11:00

## 2024-01-01 RX ADMIN — SODIUM CHLORIDE 4 MILLILITER(S): 9 INJECTION INTRAMUSCULAR; INTRAVENOUS; SUBCUTANEOUS at 05:36

## 2024-01-01 RX ADMIN — Medication 10 MILLIGRAM(S): at 00:45

## 2024-01-01 RX ADMIN — Medication 2 MILLIGRAM(S): at 06:04

## 2024-01-01 RX ADMIN — Medication 52.5 MILLIGRAM(S): at 00:01

## 2024-01-01 RX ADMIN — SODIUM CHLORIDE 4 MILLILITER(S): 9 INJECTION INTRAMUSCULAR; INTRAVENOUS; SUBCUTANEOUS at 11:13

## 2024-01-01 RX ADMIN — POLYETHYLENE GLYCOL 3350 17 GRAM(S): 17 POWDER, FOR SOLUTION ORAL at 11:20

## 2024-01-01 RX ADMIN — TICAGRELOR 60 MILLIGRAM(S): 90 TABLET ORAL at 05:40

## 2024-01-01 RX ADMIN — SODIUM CHLORIDE 4 MILLILITER(S): 9 INJECTION INTRAMUSCULAR; INTRAVENOUS; SUBCUTANEOUS at 15:20

## 2024-01-01 RX ADMIN — Medication 2: at 05:20

## 2024-01-01 RX ADMIN — Medication 2: at 05:32

## 2024-01-01 RX ADMIN — Medication 81 MILLIGRAM(S): at 11:21

## 2024-01-01 RX ADMIN — MIDODRINE HYDROCHLORIDE 10 MILLIGRAM(S): 5 TABLET ORAL at 21:26

## 2024-01-01 RX ADMIN — POVIDONE, PROPYLENE GLYCOL 1 APPLICATION(S): 6.8; 3 LIQUID OPHTHALMIC at 06:16

## 2024-01-01 RX ADMIN — CHLORHEXIDINE GLUCONATE 15 MILLILITER(S): 40 SOLUTION TOPICAL at 17:36

## 2024-01-01 RX ADMIN — SODIUM CHLORIDE 4 MILLILITER(S): 9 INJECTION INTRAMUSCULAR; INTRAVENOUS; SUBCUTANEOUS at 03:40

## 2024-01-01 RX ADMIN — SODIUM CHLORIDE 4 MILLILITER(S): 9 INJECTION INTRAMUSCULAR; INTRAVENOUS; SUBCUTANEOUS at 00:17

## 2024-01-01 RX ADMIN — ENOXAPARIN SODIUM 30 MILLIGRAM(S): 100 INJECTION SUBCUTANEOUS at 10:29

## 2024-01-01 RX ADMIN — ZINC OXIDE 1 APPLICATION(S): 100 OINTMENT TOPICAL at 18:07

## 2024-01-01 RX ADMIN — Medication 500 MILLIGRAM(S): at 05:32

## 2024-01-01 RX ADMIN — FENTANYL CITRATE 50 MICROGRAM(S): 50 INJECTION INTRAMUSCULAR; INTRAVENOUS at 00:33

## 2024-01-01 RX ADMIN — POLYETHYLENE GLYCOL 3350 17 GRAM(S): 17 POWDER, FOR SOLUTION ORAL at 12:52

## 2024-01-01 RX ADMIN — IPRATROPIUM BROMIDE AND ALBUTEROL SULFATE 3 MILLILITER(S): .5; 3 SOLUTION RESPIRATORY (INHALATION) at 17:00

## 2024-01-01 RX ADMIN — Medication 500 MILLIGRAM(S): at 17:12

## 2024-01-01 RX ADMIN — CHLORHEXIDINE GLUCONATE 15 MILLILITER(S): 40 SOLUTION TOPICAL at 17:14

## 2024-01-01 RX ADMIN — CHLORHEXIDINE GLUCONATE 1 APPLICATION(S): 40 SOLUTION TOPICAL at 05:25

## 2024-01-01 RX ADMIN — TICAGRELOR 60 MILLIGRAM(S): 90 TABLET ORAL at 05:14

## 2024-01-01 RX ADMIN — IPRATROPIUM BROMIDE AND ALBUTEROL SULFATE 3 MILLILITER(S): .5; 3 SOLUTION RESPIRATORY (INHALATION) at 18:25

## 2024-01-01 RX ADMIN — Medication 2 MILLIGRAM(S): at 09:02

## 2024-01-01 RX ADMIN — SODIUM CHLORIDE 4 MILLILITER(S): 9 INJECTION INTRAMUSCULAR; INTRAVENOUS; SUBCUTANEOUS at 03:03

## 2024-01-01 RX ADMIN — MIDODRINE HYDROCHLORIDE 10 MILLIGRAM(S): 5 TABLET ORAL at 22:34

## 2024-01-01 RX ADMIN — MIDODRINE HYDROCHLORIDE 10 MILLIGRAM(S): 5 TABLET ORAL at 14:14

## 2024-01-01 RX ADMIN — Medication 4: at 12:17

## 2024-01-01 RX ADMIN — SODIUM CHLORIDE 4 MILLILITER(S): 9 INJECTION INTRAMUSCULAR; INTRAVENOUS; SUBCUTANEOUS at 15:15

## 2024-01-01 RX ADMIN — ZINC OXIDE 1 APPLICATION(S): 100 OINTMENT TOPICAL at 17:45

## 2024-01-01 RX ADMIN — HUMAN INSULIN 4 UNIT(S): 100 INJECTION, SUSPENSION SUBCUTANEOUS at 23:44

## 2024-01-01 RX ADMIN — PIPERACILLIN SODIUM AND TAZOBACTAM SODIUM 25 GRAM(S): 3; .375 INJECTION, POWDER, FOR SOLUTION INTRAVENOUS at 13:28

## 2024-01-01 RX ADMIN — Medication 166.67 MILLIGRAM(S): at 14:04

## 2024-01-01 RX ADMIN — TICAGRELOR 60 MILLIGRAM(S): 90 TABLET ORAL at 17:33

## 2024-01-01 RX ADMIN — Medication 40 MILLIGRAM(S): at 11:57

## 2024-01-01 RX ADMIN — ZINC OXIDE 1 APPLICATION(S): 100 OINTMENT TOPICAL at 06:07

## 2024-01-01 RX ADMIN — POVIDONE, PROPYLENE GLYCOL 1 DROP(S): 6.8; 3 LIQUID OPHTHALMIC at 11:48

## 2024-01-01 RX ADMIN — TICAGRELOR 60 MILLIGRAM(S): 90 TABLET ORAL at 17:02

## 2024-01-01 RX ADMIN — PIPERACILLIN SODIUM AND TAZOBACTAM SODIUM 25 GRAM(S): 3; .375 INJECTION, POWDER, FOR SOLUTION INTRAVENOUS at 17:39

## 2024-01-01 RX ADMIN — SODIUM CHLORIDE 4 MILLILITER(S): 9 INJECTION INTRAMUSCULAR; INTRAVENOUS; SUBCUTANEOUS at 18:30

## 2024-01-01 RX ADMIN — TICAGRELOR 60 MILLIGRAM(S): 90 TABLET ORAL at 05:45

## 2024-01-01 RX ADMIN — HUMAN INSULIN 6 UNIT(S): 100 INJECTION, SUSPENSION SUBCUTANEOUS at 05:51

## 2024-01-01 RX ADMIN — Medication 6.78 MICROGRAM(S)/KG/MIN: at 08:33

## 2024-01-01 RX ADMIN — POVIDONE, PROPYLENE GLYCOL 1 DROP(S): 6.8; 3 LIQUID OPHTHALMIC at 05:26

## 2024-01-01 RX ADMIN — TICAGRELOR 60 MILLIGRAM(S): 90 TABLET ORAL at 06:38

## 2024-01-01 RX ADMIN — IPRATROPIUM BROMIDE AND ALBUTEROL SULFATE 3 MILLILITER(S): .5; 3 SOLUTION RESPIRATORY (INHALATION) at 11:21

## 2024-01-01 RX ADMIN — Medication 1 MG/HR: at 09:54

## 2024-01-01 RX ADMIN — SODIUM CHLORIDE 4 MILLILITER(S): 9 INJECTION INTRAMUSCULAR; INTRAVENOUS; SUBCUTANEOUS at 08:08

## 2024-01-01 RX ADMIN — TICAGRELOR 60 MILLIGRAM(S): 90 TABLET ORAL at 05:22

## 2024-01-01 RX ADMIN — Medication 55 MILLIGRAM(S): at 05:52

## 2024-01-01 RX ADMIN — MIDODRINE HYDROCHLORIDE 10 MILLIGRAM(S): 5 TABLET ORAL at 05:54

## 2024-01-01 RX ADMIN — IPRATROPIUM BROMIDE AND ALBUTEROL SULFATE 3 MILLILITER(S): .5; 3 SOLUTION RESPIRATORY (INHALATION) at 08:08

## 2024-01-01 RX ADMIN — POVIDONE, PROPYLENE GLYCOL 1 APPLICATION(S): 6.8; 3 LIQUID OPHTHALMIC at 05:14

## 2024-01-01 RX ADMIN — ESCITALOPRAM OXALATE 10 MILLIGRAM(S): 10 TABLET ORAL at 14:05

## 2024-01-01 RX ADMIN — MIDODRINE HYDROCHLORIDE 10 MILLIGRAM(S): 5 TABLET ORAL at 21:10

## 2024-01-01 RX ADMIN — Medication 50 MILLIGRAM(S): at 06:38

## 2024-01-01 RX ADMIN — Medication 40 MILLIGRAM(S): at 11:35

## 2024-01-01 RX ADMIN — TICAGRELOR 60 MILLIGRAM(S): 90 TABLET ORAL at 17:12

## 2024-01-01 RX ADMIN — HUMAN INSULIN 4 UNIT(S): 100 INJECTION, SUSPENSION SUBCUTANEOUS at 11:57

## 2024-01-01 RX ADMIN — Medication 2 MILLIGRAM(S): at 22:10

## 2024-01-01 RX ADMIN — LEVETIRACETAM 500 MILLIGRAM(S): 1000 TABLET ORAL at 17:37

## 2024-01-01 RX ADMIN — POVIDONE, PROPYLENE GLYCOL 1 DROP(S): 6.8; 3 LIQUID OPHTHALMIC at 11:21

## 2024-01-01 RX ADMIN — SODIUM CHLORIDE 4 MILLILITER(S): 9 INJECTION INTRAMUSCULAR; INTRAVENOUS; SUBCUTANEOUS at 00:35

## 2024-01-01 RX ADMIN — Medication 500 MILLIGRAM(S): at 17:24

## 2024-01-01 RX ADMIN — HYDROMORPHONE HYDROCHLORIDE 0.5 MILLIGRAM(S): 2 TABLET ORAL at 02:28

## 2024-01-01 RX ADMIN — PIPERACILLIN SODIUM AND TAZOBACTAM SODIUM 25 GRAM(S): 3; .375 INJECTION, POWDER, FOR SOLUTION INTRAVENOUS at 22:27

## 2024-01-01 RX ADMIN — Medication 6: at 23:46

## 2024-01-01 RX ADMIN — Medication 40 MILLIGRAM(S): at 11:37

## 2024-01-01 RX ADMIN — POLYETHYLENE GLYCOL 3350 17 GRAM(S): 17 POWDER, FOR SOLUTION ORAL at 11:35

## 2024-01-01 RX ADMIN — ACETAMINOPHEN 650 MILLIGRAM(S): 325 TABLET ORAL at 13:12

## 2024-01-01 RX ADMIN — SODIUM CHLORIDE 4 MILLILITER(S): 9 INJECTION INTRAMUSCULAR; INTRAVENOUS; SUBCUTANEOUS at 22:59

## 2024-01-01 RX ADMIN — FENTANYL CITRATE 100 MICROGRAM(S): 50 INJECTION INTRAMUSCULAR; INTRAVENOUS at 16:12

## 2024-01-01 RX ADMIN — LEVETIRACETAM 420 MILLIGRAM(S): 1000 TABLET ORAL at 03:22

## 2024-01-01 RX ADMIN — MIDODRINE HYDROCHLORIDE 10 MILLIGRAM(S): 5 TABLET ORAL at 21:45

## 2024-01-01 RX ADMIN — Medication 125 MILLIGRAM(S): at 14:04

## 2024-01-01 RX ADMIN — ACETAMINOPHEN 650 MILLIGRAM(S): 325 TABLET ORAL at 21:30

## 2024-01-01 RX ADMIN — CHLORHEXIDINE GLUCONATE 1 APPLICATION(S): 40 SOLUTION TOPICAL at 12:00

## 2024-01-01 RX ADMIN — PIPERACILLIN SODIUM AND TAZOBACTAM SODIUM 25 GRAM(S): 3; .375 INJECTION, POWDER, FOR SOLUTION INTRAVENOUS at 14:13

## 2024-01-01 RX ADMIN — TICAGRELOR 60 MILLIGRAM(S): 90 TABLET ORAL at 06:06

## 2024-01-01 RX ADMIN — Medication 250 MILLIGRAM(S): at 14:45

## 2024-01-01 RX ADMIN — IPRATROPIUM BROMIDE AND ALBUTEROL SULFATE 3 MILLILITER(S): .5; 3 SOLUTION RESPIRATORY (INHALATION) at 06:04

## 2024-01-01 RX ADMIN — ACETAMINOPHEN 650 MILLIGRAM(S): 325 TABLET ORAL at 11:29

## 2024-01-01 RX ADMIN — LEVETIRACETAM 420 MILLIGRAM(S): 1000 TABLET ORAL at 02:05

## 2024-01-01 RX ADMIN — Medication 2 MILLIGRAM(S): at 16:04

## 2024-01-01 RX ADMIN — TICAGRELOR 60 MILLIGRAM(S): 90 TABLET ORAL at 05:25

## 2024-01-01 RX ADMIN — ZINC OXIDE 1 APPLICATION(S): 100 OINTMENT TOPICAL at 05:16

## 2024-01-01 RX ADMIN — PIPERACILLIN SODIUM AND TAZOBACTAM SODIUM 25 GRAM(S): 3; .375 INJECTION, POWDER, FOR SOLUTION INTRAVENOUS at 13:34

## 2024-01-01 RX ADMIN — HUMAN INSULIN 6 UNIT(S): 100 INJECTION, SUSPENSION SUBCUTANEOUS at 18:06

## 2024-01-01 RX ADMIN — IPRATROPIUM BROMIDE AND ALBUTEROL SULFATE 3 MILLILITER(S): .5; 3 SOLUTION RESPIRATORY (INHALATION) at 11:14

## 2024-01-01 RX ADMIN — Medication 2: at 23:58

## 2024-01-01 RX ADMIN — SODIUM CHLORIDE 100 MILLILITER(S): 9 INJECTION, SOLUTION INTRAVENOUS at 20:17

## 2024-01-01 RX ADMIN — TICAGRELOR 60 MILLIGRAM(S): 90 TABLET ORAL at 17:44

## 2024-01-01 RX ADMIN — CHLORHEXIDINE GLUCONATE 15 MILLILITER(S): 40 SOLUTION TOPICAL at 05:25

## 2024-01-01 RX ADMIN — ACETAMINOPHEN 650 MILLIGRAM(S): 325 TABLET ORAL at 14:30

## 2024-01-01 RX ADMIN — CHLORHEXIDINE GLUCONATE 1 APPLICATION(S): 40 SOLUTION TOPICAL at 11:09

## 2024-01-01 RX ADMIN — Medication 40 MILLIGRAM(S): at 12:50

## 2024-01-01 RX ADMIN — POVIDONE, PROPYLENE GLYCOL 1 APPLICATION(S): 6.8; 3 LIQUID OPHTHALMIC at 05:22

## 2024-01-01 RX ADMIN — MODAFINIL 100 MILLIGRAM(S): 200 TABLET ORAL at 17:12

## 2024-01-01 RX ADMIN — Medication 2 MILLIGRAM(S): at 21:03

## 2024-01-01 RX ADMIN — HUMAN INSULIN 4 UNIT(S): 100 INJECTION, SUSPENSION SUBCUTANEOUS at 17:23

## 2024-01-01 RX ADMIN — Medication 81 MILLIGRAM(S): at 11:51

## 2024-01-01 RX ADMIN — CHLORHEXIDINE GLUCONATE 15 MILLILITER(S): 40 SOLUTION TOPICAL at 05:44

## 2024-01-01 RX ADMIN — MIDODRINE HYDROCHLORIDE 10 MILLIGRAM(S): 5 TABLET ORAL at 05:03

## 2024-01-01 RX ADMIN — LORAZEPAM 0.5 MILLIGRAM(S): 4 INJECTION INTRAMUSCULAR; INTRAVENOUS at 16:05

## 2024-01-01 RX ADMIN — IPRATROPIUM BROMIDE AND ALBUTEROL SULFATE 3 MILLILITER(S): .5; 3 SOLUTION RESPIRATORY (INHALATION) at 17:39

## 2024-01-01 RX ADMIN — POVIDONE, PROPYLENE GLYCOL 1 DROP(S): 6.8; 3 LIQUID OPHTHALMIC at 05:59

## 2024-01-01 RX ADMIN — IPRATROPIUM BROMIDE AND ALBUTEROL SULFATE 3 MILLILITER(S): .5; 3 SOLUTION RESPIRATORY (INHALATION) at 00:03

## 2024-01-01 RX ADMIN — MIDODRINE HYDROCHLORIDE 10 MILLIGRAM(S): 5 TABLET ORAL at 21:03

## 2024-01-01 RX ADMIN — CHLORHEXIDINE GLUCONATE 15 MILLILITER(S): 40 SOLUTION TOPICAL at 17:20

## 2024-01-01 RX ADMIN — IPRATROPIUM BROMIDE AND ALBUTEROL SULFATE 3 MILLILITER(S): .5; 3 SOLUTION RESPIRATORY (INHALATION) at 18:28

## 2024-01-01 RX ADMIN — Medication 125 MILLIGRAM(S): at 05:52

## 2024-01-01 RX ADMIN — SODIUM CHLORIDE 4 MILLILITER(S): 9 INJECTION INTRAMUSCULAR; INTRAVENOUS; SUBCUTANEOUS at 04:02

## 2024-01-01 RX ADMIN — Medication 2: at 05:26

## 2024-01-01 RX ADMIN — TICAGRELOR 60 MILLIGRAM(S): 90 TABLET ORAL at 05:36

## 2024-01-01 RX ADMIN — Medication 500 MILLIGRAM(S): at 17:19

## 2024-01-01 RX ADMIN — CHLORHEXIDINE GLUCONATE 15 MILLILITER(S): 40 SOLUTION TOPICAL at 05:05

## 2024-01-01 RX ADMIN — ACETAMINOPHEN 650 MILLIGRAM(S): 325 TABLET ORAL at 07:10

## 2024-01-01 RX ADMIN — HUMAN INSULIN 7 UNIT(S): 100 INJECTION, SUSPENSION SUBCUTANEOUS at 00:25

## 2024-01-01 RX ADMIN — Medication 500 MILLIGRAM(S): at 05:22

## 2024-01-01 RX ADMIN — SODIUM CHLORIDE 4 MILLILITER(S): 9 INJECTION INTRAMUSCULAR; INTRAVENOUS; SUBCUTANEOUS at 17:10

## 2024-01-01 RX ADMIN — CHLORHEXIDINE GLUCONATE 15 MILLILITER(S): 40 SOLUTION TOPICAL at 05:41

## 2024-01-01 RX ADMIN — ACETAMINOPHEN 1000 MILLIGRAM(S): 325 TABLET ORAL at 14:06

## 2024-01-01 RX ADMIN — CHLORHEXIDINE GLUCONATE 1 APPLICATION(S): 40 SOLUTION TOPICAL at 14:00

## 2024-01-01 RX ADMIN — POVIDONE, PROPYLENE GLYCOL 1 DROP(S): 6.8; 3 LIQUID OPHTHALMIC at 05:02

## 2024-01-01 RX ADMIN — IPRATROPIUM BROMIDE AND ALBUTEROL SULFATE 3 MILLILITER(S): .5; 3 SOLUTION RESPIRATORY (INHALATION) at 06:18

## 2024-01-01 RX ADMIN — MODAFINIL 100 MILLIGRAM(S): 200 TABLET ORAL at 05:23

## 2024-01-01 RX ADMIN — SODIUM CHLORIDE 4 MILLILITER(S): 9 INJECTION INTRAMUSCULAR; INTRAVENOUS; SUBCUTANEOUS at 04:50

## 2024-01-01 RX ADMIN — INSULIN GLARGINE 10 UNIT(S): 100 INJECTION, SOLUTION SUBCUTANEOUS at 21:58

## 2024-01-01 RX ADMIN — ESCITALOPRAM OXALATE 10 MILLIGRAM(S): 10 TABLET ORAL at 14:04

## 2024-01-01 RX ADMIN — Medication 40 MILLIGRAM(S): at 11:09

## 2024-01-01 RX ADMIN — LEVETIRACETAM 420 MILLIGRAM(S): 1000 TABLET ORAL at 14:37

## 2024-01-01 RX ADMIN — Medication 40 MILLIGRAM(S): at 12:30

## 2024-01-01 RX ADMIN — SODIUM CHLORIDE 4 MILLILITER(S): 9 INJECTION INTRAMUSCULAR; INTRAVENOUS; SUBCUTANEOUS at 11:29

## 2024-01-01 RX ADMIN — INSULIN GLARGINE 10 UNIT(S): 100 INJECTION, SOLUTION SUBCUTANEOUS at 22:17

## 2024-01-01 RX ADMIN — CHLORHEXIDINE GLUCONATE 1 APPLICATION(S): 40 SOLUTION TOPICAL at 05:44

## 2024-01-01 RX ADMIN — TOBRAMYCIN 300 MILLIGRAM(S): 1200 INJECTION, POWDER, FOR SOLUTION INTRAVENOUS at 06:40

## 2024-01-01 RX ADMIN — ENOXAPARIN SODIUM 30 MILLIGRAM(S): 100 INJECTION SUBCUTANEOUS at 11:56

## 2024-01-01 RX ADMIN — CHLORHEXIDINE GLUCONATE 15 MILLILITER(S): 40 SOLUTION TOPICAL at 17:38

## 2024-01-01 RX ADMIN — POVIDONE, PROPYLENE GLYCOL 1 DROP(S): 6.8; 3 LIQUID OPHTHALMIC at 23:43

## 2024-01-01 RX ADMIN — IPRATROPIUM BROMIDE AND ALBUTEROL SULFATE 3 MILLILITER(S): .5; 3 SOLUTION RESPIRATORY (INHALATION) at 05:24

## 2024-01-01 RX ADMIN — Medication 500 MILLIGRAM(S): at 05:10

## 2024-01-01 RX ADMIN — POVIDONE, PROPYLENE GLYCOL 1 APPLICATION(S): 6.8; 3 LIQUID OPHTHALMIC at 05:21

## 2024-01-01 RX ADMIN — Medication 6: at 11:17

## 2024-01-01 RX ADMIN — ESCITALOPRAM OXALATE 10 MILLIGRAM(S): 10 TABLET ORAL at 11:56

## 2024-01-01 RX ADMIN — MEMANTINE HYDROCHLORIDE 5 MILLIGRAM(S): 10 TABLET ORAL at 06:38

## 2024-01-01 RX ADMIN — Medication 6.78 MICROGRAM(S)/KG/MIN: at 13:28

## 2024-01-01 RX ADMIN — Medication 5000 UNIT(S): at 14:02

## 2024-01-01 RX ADMIN — Medication 6.78 MICROGRAM(S)/KG/MIN: at 17:44

## 2024-01-01 RX ADMIN — ACETAMINOPHEN 650 MILLIGRAM(S): 325 TABLET ORAL at 00:22

## 2024-01-01 RX ADMIN — Medication 100 MILLILITER(S): at 11:18

## 2024-01-01 RX ADMIN — MIDODRINE HYDROCHLORIDE 10 MILLIGRAM(S): 5 TABLET ORAL at 13:37

## 2024-01-01 RX ADMIN — Medication 2 MILLIGRAM(S): at 21:05

## 2024-01-01 RX ADMIN — CHLORHEXIDINE GLUCONATE 1 APPLICATION(S): 40 SOLUTION TOPICAL at 05:15

## 2024-01-01 RX ADMIN — CHLORHEXIDINE GLUCONATE 1 APPLICATION(S): 40 SOLUTION TOPICAL at 11:51

## 2024-01-01 RX ADMIN — Medication 52.5 MILLIGRAM(S): at 22:26

## 2024-01-01 RX ADMIN — CHLORHEXIDINE GLUCONATE 1 APPLICATION(S): 40 SOLUTION TOPICAL at 05:06

## 2024-01-01 RX ADMIN — HUMAN INSULIN 7 UNIT(S): 100 INJECTION, SUSPENSION SUBCUTANEOUS at 17:49

## 2024-01-01 RX ADMIN — Medication 125 MILLIGRAM(S): at 22:11

## 2024-01-01 RX ADMIN — POVIDONE, PROPYLENE GLYCOL 1 DROP(S): 6.8; 3 LIQUID OPHTHALMIC at 17:01

## 2024-01-01 RX ADMIN — IPRATROPIUM BROMIDE AND ALBUTEROL SULFATE 3 MILLILITER(S): .5; 3 SOLUTION RESPIRATORY (INHALATION) at 18:30

## 2024-01-01 RX ADMIN — MODAFINIL 100 MILLIGRAM(S): 200 TABLET ORAL at 05:26

## 2024-01-01 RX ADMIN — TICAGRELOR 60 MILLIGRAM(S): 90 TABLET ORAL at 05:04

## 2024-01-01 RX ADMIN — ZINC OXIDE 1 APPLICATION(S): 100 OINTMENT TOPICAL at 17:23

## 2024-01-01 RX ADMIN — SODIUM CHLORIDE 4 MILLILITER(S): 9 INJECTION INTRAMUSCULAR; INTRAVENOUS; SUBCUTANEOUS at 17:03

## 2024-01-01 RX ADMIN — HUMAN INSULIN 8 UNIT(S): 100 INJECTION, SUSPENSION SUBCUTANEOUS at 17:17

## 2024-01-01 RX ADMIN — ZINC OXIDE 1 APPLICATION(S): 100 OINTMENT TOPICAL at 17:36

## 2024-01-01 RX ADMIN — LEVETIRACETAM 500 MILLIGRAM(S): 1000 TABLET ORAL at 05:40

## 2024-01-01 RX ADMIN — Medication 5 UNIT(S): at 07:50

## 2024-01-01 RX ADMIN — Medication 81 MILLIGRAM(S): at 12:23

## 2024-01-01 RX ADMIN — MODAFINIL 100 MILLIGRAM(S): 200 TABLET ORAL at 05:57

## 2024-01-01 RX ADMIN — IPRATROPIUM BROMIDE AND ALBUTEROL SULFATE 3 MILLILITER(S): .5; 3 SOLUTION RESPIRATORY (INHALATION) at 14:57

## 2024-01-01 RX ADMIN — ACETAMINOPHEN 650 MILLIGRAM(S): 325 TABLET ORAL at 11:20

## 2024-01-01 RX ADMIN — POVIDONE, PROPYLENE GLYCOL 1 APPLICATION(S): 6.8; 3 LIQUID OPHTHALMIC at 17:47

## 2024-01-01 RX ADMIN — HUMAN INSULIN 7 UNIT(S): 100 INJECTION, SUSPENSION SUBCUTANEOUS at 11:26

## 2024-01-01 RX ADMIN — ESCITALOPRAM OXALATE 10 MILLIGRAM(S): 10 TABLET ORAL at 11:34

## 2024-01-01 RX ADMIN — ESCITALOPRAM OXALATE 10 MILLIGRAM(S): 10 TABLET ORAL at 11:57

## 2024-01-01 RX ADMIN — IPRATROPIUM BROMIDE AND ALBUTEROL SULFATE 3 MILLILITER(S): .5; 3 SOLUTION RESPIRATORY (INHALATION) at 04:55

## 2024-01-01 RX ADMIN — Medication 3: at 23:44

## 2024-01-01 RX ADMIN — TICAGRELOR 60 MILLIGRAM(S): 90 TABLET ORAL at 17:15

## 2024-01-01 RX ADMIN — Medication 2 MILLIGRAM(S): at 21:31

## 2024-01-01 RX ADMIN — ACETAMINOPHEN 650 MILLIGRAM(S): 325 TABLET ORAL at 22:00

## 2024-01-01 RX ADMIN — LEVETIRACETAM 500 MILLIGRAM(S): 1000 TABLET ORAL at 17:02

## 2024-01-01 RX ADMIN — ESCITALOPRAM OXALATE 10 MILLIGRAM(S): 10 TABLET ORAL at 11:51

## 2024-01-01 RX ADMIN — Medication 2 MILLIGRAM(S): at 21:10

## 2024-01-01 RX ADMIN — CHLORHEXIDINE GLUCONATE 15 MILLILITER(S): 40 SOLUTION TOPICAL at 05:31

## 2024-01-01 RX ADMIN — POVIDONE, PROPYLENE GLYCOL 1 DROP(S): 6.8; 3 LIQUID OPHTHALMIC at 23:30

## 2024-01-01 RX ADMIN — ESCITALOPRAM OXALATE 10 MILLIGRAM(S): 10 TABLET ORAL at 11:42

## 2024-01-01 RX ADMIN — Medication 81 MILLIGRAM(S): at 12:35

## 2024-01-01 RX ADMIN — Medication 6.78 MICROGRAM(S)/KG/MIN: at 13:40

## 2024-01-01 RX ADMIN — CHLORHEXIDINE GLUCONATE 15 MILLILITER(S): 40 SOLUTION TOPICAL at 18:06

## 2024-01-01 RX ADMIN — Medication 30 MILLILITER(S): at 11:24

## 2024-01-01 RX ADMIN — Medication 100 MILLIEQUIVALENT(S): at 03:41

## 2024-01-01 RX ADMIN — SODIUM CHLORIDE 4 MILLILITER(S): 9 INJECTION INTRAMUSCULAR; INTRAVENOUS; SUBCUTANEOUS at 23:45

## 2024-01-01 RX ADMIN — HUMAN INSULIN 8 UNIT(S): 100 INJECTION, SUSPENSION SUBCUTANEOUS at 11:23

## 2024-01-01 RX ADMIN — Medication 81 MILLIGRAM(S): at 11:26

## 2024-01-01 RX ADMIN — IPRATROPIUM BROMIDE AND ALBUTEROL SULFATE 3 MILLILITER(S): .5; 3 SOLUTION RESPIRATORY (INHALATION) at 15:08

## 2024-01-01 RX ADMIN — HYDROMORPHONE HYDROCHLORIDE 0.5 MILLIGRAM(S): 2 TABLET ORAL at 02:30

## 2024-01-01 RX ADMIN — HUMAN INSULIN 4 UNIT(S): 100 INJECTION, SUSPENSION SUBCUTANEOUS at 12:02

## 2024-01-01 RX ADMIN — ACETAMINOPHEN 650 MILLIGRAM(S): 325 TABLET ORAL at 08:44

## 2024-01-01 RX ADMIN — PIPERACILLIN SODIUM AND TAZOBACTAM SODIUM 200 GRAM(S): 3; .375 INJECTION, POWDER, FOR SOLUTION INTRAVENOUS at 00:16

## 2024-01-01 RX ADMIN — Medication 40 MILLIEQUIVALENT(S): at 05:10

## 2024-01-01 RX ADMIN — FENTANYL CITRATE 50 MICROGRAM(S): 50 INJECTION INTRAMUSCULAR; INTRAVENOUS at 20:58

## 2024-01-01 RX ADMIN — SODIUM CHLORIDE 4 MILLILITER(S): 9 INJECTION INTRAMUSCULAR; INTRAVENOUS; SUBCUTANEOUS at 17:42

## 2024-01-01 RX ADMIN — POVIDONE, PROPYLENE GLYCOL 1 DROP(S): 6.8; 3 LIQUID OPHTHALMIC at 11:57

## 2024-01-01 RX ADMIN — POVIDONE, PROPYLENE GLYCOL 1 APPLICATION(S): 6.8; 3 LIQUID OPHTHALMIC at 06:07

## 2024-01-01 RX ADMIN — CHLORHEXIDINE GLUCONATE 15 MILLILITER(S): 40 SOLUTION TOPICAL at 05:52

## 2024-01-01 RX ADMIN — Medication 20 MILLIGRAM(S): at 22:17

## 2024-01-01 RX ADMIN — Medication 50 MILLILITER(S): at 19:33

## 2024-01-01 RX ADMIN — FENTANYL CITRATE 50 MICROGRAM(S): 50 INJECTION INTRAMUSCULAR; INTRAVENOUS at 03:10

## 2024-01-01 RX ADMIN — HUMAN INSULIN 6 UNIT(S): 100 INJECTION, SUSPENSION SUBCUTANEOUS at 23:03

## 2024-01-01 RX ADMIN — ACETAMINOPHEN 650 MILLIGRAM(S): 325 TABLET ORAL at 10:00

## 2024-01-01 RX ADMIN — Medication 81 MILLIGRAM(S): at 12:08

## 2024-01-01 RX ADMIN — Medication 6: at 12:03

## 2024-01-01 RX ADMIN — Medication 100 MILLIEQUIVALENT(S): at 05:46

## 2024-01-01 RX ADMIN — HUMAN INSULIN 7 UNIT(S): 100 INJECTION, SUSPENSION SUBCUTANEOUS at 17:34

## 2024-01-01 RX ADMIN — Medication 4: at 23:03

## 2024-01-01 RX ADMIN — POVIDONE, PROPYLENE GLYCOL 1 DROP(S): 6.8; 3 LIQUID OPHTHALMIC at 06:06

## 2024-01-01 RX ADMIN — ENOXAPARIN SODIUM 30 MILLIGRAM(S): 100 INJECTION SUBCUTANEOUS at 21:46

## 2024-01-01 RX ADMIN — ACETAMINOPHEN 650 MILLIGRAM(S): 325 TABLET ORAL at 01:10

## 2024-01-01 RX ADMIN — SODIUM CHLORIDE 4 MILLILITER(S): 9 INJECTION INTRAMUSCULAR; INTRAVENOUS; SUBCUTANEOUS at 08:45

## 2024-01-01 RX ADMIN — GLYCOPYRROLATE 0.4 MILLIGRAM(S): 0.2 INJECTION INTRAMUSCULAR; INTRAVENOUS at 16:05

## 2024-01-01 RX ADMIN — IPRATROPIUM BROMIDE AND ALBUTEROL SULFATE 3 MILLILITER(S): .5; 3 SOLUTION RESPIRATORY (INHALATION) at 10:02

## 2024-01-01 RX ADMIN — Medication 5000 UNIT(S): at 22:33

## 2024-01-01 RX ADMIN — ACETAMINOPHEN 650 MILLIGRAM(S): 325 TABLET ORAL at 21:20

## 2024-01-01 RX ADMIN — Medication 2: at 11:46

## 2024-01-01 RX ADMIN — Medication 2: at 17:11

## 2024-01-01 RX ADMIN — PIPERACILLIN SODIUM AND TAZOBACTAM SODIUM 25 GRAM(S): 3; .375 INJECTION, POWDER, FOR SOLUTION INTRAVENOUS at 21:47

## 2024-01-01 RX ADMIN — IPRATROPIUM BROMIDE AND ALBUTEROL SULFATE 3 MILLILITER(S): .5; 3 SOLUTION RESPIRATORY (INHALATION) at 00:23

## 2024-01-01 RX ADMIN — Medication 8: at 12:36

## 2024-01-01 RX ADMIN — LEVETIRACETAM 420 MILLIGRAM(S): 1000 TABLET ORAL at 02:56

## 2024-01-01 RX ADMIN — SODIUM CHLORIDE 4 MILLILITER(S): 9 INJECTION INTRAMUSCULAR; INTRAVENOUS; SUBCUTANEOUS at 05:17

## 2024-01-01 RX ADMIN — Medication 2: at 12:31

## 2024-01-01 RX ADMIN — IPRATROPIUM BROMIDE AND ALBUTEROL SULFATE 3 MILLILITER(S): .5; 3 SOLUTION RESPIRATORY (INHALATION) at 03:36

## 2024-01-01 RX ADMIN — FENTANYL CITRATE 50 MICROGRAM(S): 50 INJECTION INTRAMUSCULAR; INTRAVENOUS at 21:10

## 2024-01-01 RX ADMIN — Medication 6.78 MICROGRAM(S)/KG/MIN: at 19:17

## 2024-01-01 RX ADMIN — ESCITALOPRAM OXALATE 10 MILLIGRAM(S): 10 TABLET ORAL at 11:22

## 2024-01-01 RX ADMIN — SODIUM CHLORIDE 4 MILLILITER(S): 9 INJECTION INTRAMUSCULAR; INTRAVENOUS; SUBCUTANEOUS at 23:49

## 2024-01-01 RX ADMIN — POVIDONE, PROPYLENE GLYCOL 1 DROP(S): 6.8; 3 LIQUID OPHTHALMIC at 17:02

## 2024-01-01 RX ADMIN — Medication 500 MILLIGRAM(S): at 17:20

## 2024-01-01 RX ADMIN — HEPARIN SODIUM 5000 UNIT(S): 5000 INJECTION INTRAVENOUS; SUBCUTANEOUS at 14:16

## 2024-01-01 RX ADMIN — Medication 1000 MILLIGRAM(S): at 03:55

## 2024-01-01 RX ADMIN — LEVETIRACETAM 500 MILLIGRAM(S): 1000 TABLET ORAL at 05:46

## 2024-01-01 RX ADMIN — Medication 40 MILLIGRAM(S): at 10:45

## 2024-01-01 RX ADMIN — IPRATROPIUM BROMIDE AND ALBUTEROL SULFATE 3 MILLILITER(S): .5; 3 SOLUTION RESPIRATORY (INHALATION) at 11:29

## 2024-01-01 RX ADMIN — POVIDONE, PROPYLENE GLYCOL 1 APPLICATION(S): 6.8; 3 LIQUID OPHTHALMIC at 18:00

## 2024-01-01 RX ADMIN — HUMAN INSULIN 7 UNIT(S): 100 INJECTION, SUSPENSION SUBCUTANEOUS at 18:11

## 2024-01-01 RX ADMIN — HUMAN INSULIN 8 UNIT(S): 100 INJECTION, SUSPENSION SUBCUTANEOUS at 05:58

## 2024-01-01 RX ADMIN — TOBRAMYCIN 300 MILLIGRAM(S): 1200 INJECTION, POWDER, FOR SOLUTION INTRAVENOUS at 12:24

## 2024-01-01 RX ADMIN — MODAFINIL 100 MILLIGRAM(S): 200 TABLET ORAL at 05:32

## 2024-01-01 RX ADMIN — PIPERACILLIN SODIUM AND TAZOBACTAM SODIUM 25 GRAM(S): 3; .375 INJECTION, POWDER, FOR SOLUTION INTRAVENOUS at 05:07

## 2024-01-01 RX ADMIN — Medication 6: at 17:01

## 2024-01-01 RX ADMIN — ESCITALOPRAM OXALATE 10 MILLIGRAM(S): 10 TABLET ORAL at 12:23

## 2024-01-01 RX ADMIN — MIDODRINE HYDROCHLORIDE 10 MILLIGRAM(S): 5 TABLET ORAL at 06:02

## 2024-01-01 RX ADMIN — CHLORHEXIDINE GLUCONATE 1 APPLICATION(S): 40 SOLUTION TOPICAL at 05:53

## 2024-01-01 RX ADMIN — POVIDONE, PROPYLENE GLYCOL 1 DROP(S): 6.8; 3 LIQUID OPHTHALMIC at 12:52

## 2024-01-01 RX ADMIN — IPRATROPIUM BROMIDE AND ALBUTEROL SULFATE 3 MILLILITER(S): .5; 3 SOLUTION RESPIRATORY (INHALATION) at 00:10

## 2024-01-01 RX ADMIN — ZINC OXIDE 1 APPLICATION(S): 100 OINTMENT TOPICAL at 17:33

## 2024-01-01 RX ADMIN — PIPERACILLIN SODIUM AND TAZOBACTAM SODIUM 25 GRAM(S): 3; .375 INJECTION, POWDER, FOR SOLUTION INTRAVENOUS at 21:10

## 2024-01-01 RX ADMIN — Medication 6.78 MICROGRAM(S)/KG/MIN: at 19:24

## 2024-01-01 RX ADMIN — FENTANYL CITRATE 50 MICROGRAM(S): 50 INJECTION INTRAMUSCULAR; INTRAVENOUS at 20:40

## 2024-01-01 RX ADMIN — Medication 40 MILLIGRAM(S): at 11:11

## 2024-01-01 RX ADMIN — Medication 6: at 12:01

## 2024-01-01 RX ADMIN — POVIDONE, PROPYLENE GLYCOL 1 DROP(S): 6.8; 3 LIQUID OPHTHALMIC at 05:25

## 2024-01-01 RX ADMIN — SODIUM CHLORIDE 100 MILLILITER(S): 9 INJECTION, SOLUTION INTRAVENOUS at 12:47

## 2024-01-01 RX ADMIN — POVIDONE, PROPYLENE GLYCOL 1 DROP(S): 6.8; 3 LIQUID OPHTHALMIC at 11:58

## 2024-01-01 RX ADMIN — TICAGRELOR 60 MILLIGRAM(S): 90 TABLET ORAL at 17:24

## 2024-01-01 RX ADMIN — TICAGRELOR 60 MILLIGRAM(S): 90 TABLET ORAL at 06:21

## 2024-01-01 RX ADMIN — MODAFINIL 100 MILLIGRAM(S): 200 TABLET ORAL at 17:30

## 2024-01-01 RX ADMIN — ZINC OXIDE 1 APPLICATION(S): 100 OINTMENT TOPICAL at 05:15

## 2024-01-01 RX ADMIN — HUMAN INSULIN 7 UNIT(S): 100 INJECTION, SUSPENSION SUBCUTANEOUS at 23:25

## 2024-01-01 RX ADMIN — POLYETHYLENE GLYCOL 3350 17 GRAM(S): 17 POWDER, FOR SOLUTION ORAL at 18:05

## 2024-01-01 RX ADMIN — HYDROMORPHONE HYDROCHLORIDE 0.5 MILLIGRAM(S): 2 TABLET ORAL at 22:15

## 2024-01-01 RX ADMIN — Medication 2 MILLIGRAM(S): at 21:46

## 2024-01-01 RX ADMIN — IPRATROPIUM BROMIDE AND ALBUTEROL SULFATE 3 MILLILITER(S): .5; 3 SOLUTION RESPIRATORY (INHALATION) at 05:17

## 2024-01-01 RX ADMIN — MIDODRINE HYDROCHLORIDE 10 MILLIGRAM(S): 5 TABLET ORAL at 13:44

## 2024-01-01 RX ADMIN — ENOXAPARIN SODIUM 30 MILLIGRAM(S): 100 INJECTION SUBCUTANEOUS at 11:35

## 2024-01-01 RX ADMIN — POVIDONE, PROPYLENE GLYCOL 1 DROP(S): 6.8; 3 LIQUID OPHTHALMIC at 05:52

## 2024-01-01 RX ADMIN — Medication 6: at 07:50

## 2024-01-01 RX ADMIN — CHLORHEXIDINE GLUCONATE 15 MILLILITER(S): 40 SOLUTION TOPICAL at 17:02

## 2024-01-01 RX ADMIN — Medication 40 MILLIEQUIVALENT(S): at 05:28

## 2024-01-01 RX ADMIN — GLYCOPYRROLATE 0.4 MILLIGRAM(S): 0.2 INJECTION INTRAMUSCULAR; INTRAVENOUS at 08:37

## 2024-01-01 RX ADMIN — Medication 55 MILLIGRAM(S): at 18:02

## 2024-01-01 RX ADMIN — TICAGRELOR 60 MILLIGRAM(S): 90 TABLET ORAL at 17:10

## 2024-01-01 RX ADMIN — Medication 5000 UNIT(S): at 05:14

## 2024-01-01 RX ADMIN — Medication 12.5 GRAM(S): at 11:26

## 2024-01-01 NOTE — CONSULT NOTE ADULT - SUBJECTIVE AND OBJECTIVE BOX
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 Hospitalization with CTA demonstrating mesenteric fat stranding associated with ascending/transverse colon. S/p SMA angiography with stent placement with diagnostic laparoscopy , small bowel and visible colon viable, some inflammation of omentum in RUQ. Patient pending transfer to the floor but with emesis of food this AM and repeat CBC in PM with Hgb 5.5 from 7. No melena (dark brown stool yesterday).     Patient is unable to make decisions for himself, AOx0. Majority of history obtained by discussion with provider and family members at bedside.       Allergies:  fluoroquinolone antibiotics (Other)  Cipro (Unknown)  Tegretol (Unknown)  carbamazepine (Other)      Home Medications:    Hospital Medications:  acetaminophen   IVPB .. 1000 milliGRAM(s) IV Intermittent every 6 hours  aspirin Suppository 300 milliGRAM(s) Rectal daily  dextrose 50% Injectable 25 Gram(s) IV Push once  dextrose 50% Injectable 12.5 Gram(s) IV Push once  heparin   Injectable 5000 Unit(s) SubCutaneous every 8 hours  insulin glargine Injectable (LANTUS) 8 Unit(s) SubCutaneous at bedtime  insulin lispro (ADMELOG) corrective regimen sliding scale   SubCutaneous every 6 hours  lactated ringers. 1000 milliLiter(s) IV Continuous <Continuous>  levETIRAcetam   Injectable 250 milliGRAM(s) IV Push every 12 hours  metoclopramide Injectable 5 milliGRAM(s) IV Push every 6 hours  metoprolol tartrate Injectable 5 milliGRAM(s) IV Push every 6 hours  pantoprazole  Injectable 40 milliGRAM(s) IV Push daily  ticagrelor 60 milliGRAM(s) Oral every 12 hours      PMHX/PSHX:  Hyperlipemia    Hypertension    Coronary Artery Disease    Diabetes Mellitus Type II    Stented Coronary Artery    Neuropathy    Myocardial infarction    Stroke    Myoclonic jerking    Stage 3 chronic kidney disease    History of Cataract Extraction    Hx of CABG    H/O coronary angiogram    S/P coronary artery stent placement    S/P placement of cardiac pacemaker        Family history:  No pertinent family history in first degree relatives        Denies family history of colon cancer/polyps, stomach cancer/polyps, pancreatic cancer/masses, liver cancer/disease, ovarian cancer and endometrial cancer.    Social History:   Tob: Denies  EtOH: Denies  Illicit Drugs: Denies    ROS:   Not able to obtain     PHYSICAL EXAM:     GENERAL: Chronically ill   HEENT:  NCAT, no scleral icterus, NGT with blood tinged food   CHEST:  no respiratory distress  HEART:  Regular rate and rhythm  ABDOMEN:  Soft, non-tender, non-distended, no masses  EXTREMITIES: No edema  SKIN:  No rash/erythema/ecchymoses/petechiae/wounds/abscess/warm/dry  NEURO:  Alert and oriented x 0    Vital Signs:  Vital Signs Last 24 Hrs  T(C): 36.7 (2024 16:00), Max: 37.1 (2024 12:00)  T(F): 98 (2024 16:00), Max: 98.8 (2024 12:00)  HR: 73 (2024 16:00) (73 - 86)  BP: 109/44 (2024 16:00) (109/44 - 187/80)  BP(mean): 64 (2024 16:00) (64 - 111)  RR: 17 (2024 16:00) (17 - 24)  SpO2: 100% (2024 16:00) (97% - 100%)    Parameters below as of 2024 16:00  Patient On (Oxygen Delivery Method): room air      Daily     Daily Weight in k.5 (2024 04:00)    LABS:                        5.5    15.57 )-----------( 336      ( 2024 15:30 )             15.6     Mean Cell Volume: 86.2 fL (24 @ 15:30)        135  |  103  |  43<H>  ----------------------------<  246<H>  4.6   |  17<L>  |  1.83<H>    Ca    7.6<L>      2024 15:30  Phos  2.5       Mg     2.50         TPro  4.2<L>  /  Alb  1.8<L>  /  TBili  0.4  /  DBili  <0.2  /  AST  32  /  ALT  16  /  AlkPhos  57      LIVER FUNCTIONS - ( 2024 14:00 )  Alb: 1.8 g/dL / Pro: 4.2 g/dL / ALK PHOS: 57 U/L / ALT: 16 U/L / AST: 32 U/L / GGT: x             Urinalysis Basic - ( 2024 15:30 )    Color: x / Appearance: x / SG: x / pH: x  Gluc: 246 mg/dL / Ketone: x  / Bili: x / Urobili: x   Blood: x / Protein: x / Nitrite: x   Leuk Esterase: x / RBC: x / WBC x   Sq Epi: x / Non Sq Epi: x / Bacteria: x                              5.5    15.57 )-----------( 336      ( 2024 15:30 )             15.6                         5.2    12.79 )-----------( 318      ( 2024 14:00 )             15.6                         7.1    13.99 )-----------( 392      ( 2024 02:24 )             20.9                         7.8    13.31 )-----------( 359      ( 31 Dec 2023 11:23 )             23.0                         7.6    13.65 )-----------( 345      ( 31 Dec 2023 04:35 )             22.7       Imaging:

## 2024-01-01 NOTE — PROGRESS NOTE ADULT - SUBJECTIVE AND OBJECTIVE BOX
Interval events:  - Troponins elevated  - AXR with large gastric bubble, possibly gastroparesis 2/2 DM  - Premeal insulin increased to 5u, ISS to MISS  - Metoprolol increased to 50 bid for hypertension    NEURO  RASS (if intubated): 		CAM ICU (if concern for delirium):  Exam: AO X3  Meds: acetaminophen     Tablet .. 650 milliGRAM(s) Oral every 6 hours PRN Mild Pain (1 - 3)  escitalopram 10 milliGRAM(s) Oral daily  levETIRAcetam 250 milliGRAM(s) Oral two times a day  melatonin 6 milliGRAM(s) Oral at bedtime  memantine 5 milliGRAM(s) Oral two times a day      RESPIRATORY  RR: 22 (01-01-24 @ 00:00) (20 - 29)  SpO2: 98% (01-01-24 @ 00:00) (94% - 100%)  Wt(kg): --  Exam: bilateral, equal chest rise    Meds:     CARDIOVASCULAR  HR: 80 (01-01-24 @ 00:00) (78 - 87)  BP: 187/80 (01-01-24 @ 00:00) (138/59 - 187/80)  BP(mean): 111 (01-01-24 @ 00:00) (80 - 111)  ABP: --  ABP(mean): --  Wt(kg): --  CVP(cm H2O): --      Exam: pulse present    Meds: metoprolol tartrate 50 milliGRAM(s) Oral two times a day  ranolazine 500 milliGRAM(s) Oral two times a day      GI/NUTRITION  Exam: soft, non-tender, mildly distended  Diet: pureed  Meds: pantoprazole    Tablet 40 milliGRAM(s) Oral daily  simethicone 80 milliGRAM(s) Chew three times a day PRN Indigestion  sucralfate 1 Gram(s) Oral four times a day      GENITOURINARY  I&O's Detail    12-30 @ 07:01  -  12-31 @ 07:00  --------------------------------------------------------  IN:    IV PiggyBack: 100 mL    Oral Fluid: 800 mL  Total IN: 900 mL    OUT:    Stool (mL): 1 mL    Voided (mL): 1400 mL  Total OUT: 1401 mL    Total NET: -501 mL      12-31 @ 07:01  -  01-01 @ 01:18  --------------------------------------------------------  IN:    IV PiggyBack: 525 mL    Oral Fluid: 980 mL  Total IN: 1505 mL    OUT:    Voided (mL): 800 mL  Total OUT: 800 mL    Total NET: 705 mL          12-31    134<L>  |  101  |  25<H>  ----------------------------<  203<H>  3.6   |  21<L>  |  1.74<H>    Ca    7.5<L>      31 Dec 2023 11:23  Phos  2.2     12-31  Mg     1.90     12-31    TPro  6.1  /  Alb  2.3<L>  /  TBili  0.8  /  DBili  x   /  AST  29  /  ALT  13  /  AlkPhos  64  12-30    Meds:     HEMATOLOGIC  Meds: aspirin  chewable 81 milliGRAM(s) Oral daily  heparin   Injectable 5000 Unit(s) SubCutaneous every 8 hours  ticagrelor 60 milliGRAM(s) Oral every 12 hours                          7.8    13.31 )-----------( 359      ( 31 Dec 2023 11:23 )             23.0         INFECTIOUS DISEASES  T(C): 36 (01-01-24 @ 00:00), Max: 37.7 (12-31-23 @ 16:00)  Wt(kg): --  WBC Count: 13.31 K/uL (12-31 @ 11:23)  WBC Count: 13.65 K/uL (12-31 @ 04:35)    Recent Cultures:    Meds:     ENDOCRINE  Capillary Blood Glucose    Meds: atorvastatin 80 milliGRAM(s) Oral at bedtime  dextrose 50% Injectable 25 Gram(s) IV Push once  dextrose 50% Injectable 12.5 Gram(s) IV Push once  insulin glargine Injectable (LANTUS) 14 Unit(s) SubCutaneous at bedtime  insulin lispro (ADMELOG) corrective regimen sliding scale   SubCutaneous at bedtime  insulin lispro (ADMELOG) corrective regimen sliding scale   SubCutaneous three times a day before meals  insulin lispro Injectable (ADMELOG) 5 Unit(s) SubCutaneous three times a day before meals      ACCESS DEVICES:  [ ] Peripheral IV  [ ] Central Venous Line		[ ] R	[ ] L	[ ] IJ	[ ] Fem	[ ] SC	Placed:   [ ] Arterial Line			[ ] R	[ ] L	[ ] Fem	[ ] Rad	[ ] Ax	Placed:   [ ] PICC:					[ ] Mediport  [ ] Urinary Catheter, Date Placed:   [ ] Necessity of urinary, arterial, and venous catheters discussed    OTHER MEDICATIONS:  chlorhexidine 2% Cloths 1 Application(s) Topical daily      IMAGING:

## 2024-01-01 NOTE — CONSULT NOTE ADULT - ASSESSMENT
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 Hospitalization with CTA demonstrating mesenteric fat stranding associated with ascending/transverse colon. S/p SMA angiography with stent placement with diagnostic laparoscopy 12/24, small bowel and visible colon viable, some inflammation of omentum in RUQ. Patient pending transfer to the floor but with emesis of food and repeat CBC in PM with Hgb 5.5 from 7.     #hematemesis  #UGIB - likely upper GI bleed, ddx includes PUD, esophagitis/gastritis/duodenitis; less likely is masses or dieulafoy lesion. No evidence of portal HTN to suggest varcies/portal HTN gastropathy as etiology.   - NGT with mostly food particles/ensure but blood tinged   - Hgb 5.7 from 7.1   - NPO at 10am 1/1     Recommendations:   - keep NPO   - IV PPI 80mg x1, followed by PPI gtt   - transfuse for Hgb > 8, post transfusion CBC   - discussed with brother and sister-in-law (not HCP) about plan; family believes that if there is a GIB that can be reversible, would like to pursue endoscopy with intubation, but will defer final decision to HCP, agreeable to supportive care for now    - if patient HD unstable with recurrent hematemesis, would recommend   - GOC with HCP today   - plan for endoscopy tomorrow AM if within GOC, or more emergent if necessary but patient will need to be intubated prior to scope. Per family at bedside, patient has been more lethargic and confused. Discussed with patient that devon given mental status, cannot guarantee that patient will be extubated immediately after procedure. Family will discuss and defer to HCP for final decision.   - rest of care per primary team     All recommendations are tentative until note is attested by attending.     Juany Victoria, PGY-5  Gastroenterology/Hepatology Fellow  Available on Microsoft Teams  [phone] ([employer] Short Range Pager)  [phone] ([facility] Long Range Pager)    After 5pm, please contact the on-call GI fellow. 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 Hospitalization with CTA demonstrating mesenteric fat stranding associated with ascending/transverse colon. S/p SMA angiography with stent placement with diagnostic laparoscopy 12/24, small bowel and visible colon viable, some inflammation of omentum in RUQ. Patient pending transfer to the floor but with emesis of food and repeat CBC in PM with Hgb 5.5 from 7.     #hematemesis  #UGIB - likely upper GI bleed, ddx includes PUD, esophagitis/gastritis/duodenitis; less likely is masses or dieulafoy lesion. No evidence of portal HTN to suggest varcies/portal HTN gastropathy as etiology.   - NGT with mostly food particles/ensure but blood tinged   - Hgb 5.7 from 7.1   - NPO at 10am 1/1     Recommendations:   - keep NPO   - IV PPI 80mg x1, followed by PPI gtt   - transfuse for Hgb > 8, post transfusion CBC   - discussed with brother and sister-in-law (not HCP) about plan; family believes that if there is a GIB that can be reversible, would like to pursue endoscopy with intubation, but will defer final decision to HCP, agreeable to supportive care for now    - if patient HD unstable with recurrent hematemesis, would recommend   - GOC with HCP today   - plan for endoscopy tomorrow AM if within GOC, or more emergent if necessary but patient will need to be intubated prior to scope. Per family at bedside, patient has been more lethargic and confused. Discussed with patient that devon given mental status, cannot guarantee that patient will be extubated immediately after procedure. Family will discuss and defer to HCP for final decision.   - rest of care per primary team     All recommendations are tentative until note is attested by attending.     Juany Victoria, PGY-5  Gastroenterology/Hepatology Fellow  Available on Microsoft Teams  [phone] ([employer] Short Range Pager)  [phone] ([facility] Long Range Pager)    After 5pm, please contact the on-call GI fellow. 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 Hospitalization with CTA demonstrating mesenteric fat stranding associated with ascending/transverse colon. S/p SMA angiography with stent placement with diagnostic laparoscopy 12/24, small bowel and visible colon viable, some inflammation of omentum in RUQ. Patient pending transfer to the floor but with emesis of food and repeat CBC in PM with Hgb 5.5 from 7.     #hematemesis  #UGIB - likely upper GI bleed, ddx includes PUD, esophagitis/gastritis/duodenitis; less likely is masses or dieulafoy lesion. No evidence of portal HTN to suggest varcies/portal HTN gastropathy as etiology.   - NGT with mostly food particles/ensure but blood tinged   - Hgb 5.7 from 7.1   - NPO at 10am 1/1     Recommendations:   - keep NPO   - IV PPI 80mg x1, followed by PPI gtt   - transfuse for Hgb > 8, post transfusion CBC   - discussed with brother and sister-in-law (not HCP) about plan; family believes that if there is a GIB that can be reversible, would like to pursue endoscopy with intubation, but will defer final decision to HCP, agreeable to supportive care for now    - if patient HD unstable with recurrent hematemesis, would recommend   - GOC with HCP today   - plan for endoscopy tomorrow AM if within GOC, or more emergent if necessary but patient will need to be intubated prior to scope. Per family at bedside, patient has been more lethargic and confused. Discussed with patient that devon given mental status, cannot guarantee that patient will be extubated immediately after procedure. Family will discuss and defer to HCP for final decision.   - rest of care per primary team     All recommendations are tentative until note is attested by attending.     Juany Victoria, PGY-5  Gastroenterology/Hepatology Fellow  Available on Microsoft Teams  [phone] ([employer] Short Range Pager)  [phone] ([facility] Long Range Pager)    After 5pm, please contact the on-call GI fellow. 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 Hospitalization with CTA demonstrating mesenteric fat stranding associated with ascending/transverse colon. S/p SMA angiography with stent placement with diagnostic laparoscopy 12/24, small bowel and visible colon viable, some inflammation of omentum in RUQ. Patient pending transfer to the floor but with emesis of food and repeat CBC in PM with Hgb 5.5 from 7.     #hematemesis  #UGIB - likely upper GI bleed, ddx includes PUD, esophagitis/gastritis/duodenitis; less likely is masses or dieulafoy lesion. No evidence of portal HTN to suggest varcies/portal HTN gastropathy as etiology.   - NGT with mostly food particles/ensure but blood tinged   - Hgb 5.7 from 7.1   - NPO at 10am 1/1     Recommendations:   - keep NPO   - IV PPI 80mg x1, followed by PPI gtt   - transfuse for Hgb > 8, post transfusion CBC   - discussed with brother and sister-in-law (not HCP) about plan; family believes that if there is a GIB that can be reversible, would like to pursue endoscopy with intubation, but will defer final decision to HCP, agreeable to supportive care for now    - if patient HD unstable with recurrent hematemesis, would recommend   - GOC with HCP today   - plan for endoscopy tomorrow AM if within GOC, or more emergent if necessary but patient will need to be intubated prior to scope. Per family at bedside, patient has been more lethargic and confused. Discussed with patient that devon given mental status, cannot guarantee that patient will be extubated immediately after procedure. Family will discuss and defer to HCP for final decision.   - rest of care per primary team     All recommendations are tentative until note is attested by attending.     Juany Victoria, PGY-5  Gastroenterology/Hepatology Fellow  Available on Microsoft Teams  [phone] ([email or web address] Short Range Pager)  [phone] ([facility] Long Range Pager)    After 5pm, please contact the on-call GI fellow. 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 Hospitalization with CTA demonstrating mesenteric fat stranding associated with ascending/transverse colon. S/p SMA angiography with stent placement with diagnostic laparoscopy 12/24, small bowel and visible colon viable, some inflammation of omentum in RUQ. Patient pending transfer to the floor but with emesis of food and repeat CBC in PM with Hgb 5.5 from 7.     #hematemesis  #UGIB - likely upper GI bleed, ddx includes PUD, esophagitis/gastritis/duodenitis; less likely is masses or dieulafoy lesion. No evidence of portal HTN to suggest varcies/portal HTN gastropathy as etiology.   - NGT with mostly food particles/ensure but blood tinged   - Hgb 5.7 from 7.1   - NPO at 10am 1/1     Recommendations:   - keep NPO   - IV PPI 80mg x1, followed by PPI gtt   - transfuse for Hgb > 8, post transfusion CBC   - discussed with brother and sister-in-law (not HCP) about plan; family believes that if there is a GIB that can be reversible, would like to pursue endoscopy with intubation, but will defer final decision to HCP, agreeable to supportive care for now    - if patient HD unstable with recurrent hematemesis, would recommend intubation if within GOC   - would defer AC to primary team given that patient likely needs to continue with DAPT for recent stent   - GOC with HCP today   - plan for endoscopy tomorrow AM if within GOC, or more emergent if necessary but patient will need to be intubated prior to scope. Per family at bedside, patient has been more lethargic and confused. Discussed with patient that devon given mental status, cannot guarantee that patient will be extubated immediately after procedure. Family will discuss and defer to HCP for final decision.   - rest of care per primary team     All recommendations are tentative until note is attested by attending.     Juany Victoria, PGY-5  Gastroenterology/Hepatology Fellow  Available on Microsoft Teams  [phone] ([employer] Short Range Pager)  [phone] ([facility] Long Range Pager)    After 5pm, please contact the on-call GI fellow. 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 Hospitalization with CTA demonstrating mesenteric fat stranding associated with ascending/transverse colon. S/p SMA angiography with stent placement with diagnostic laparoscopy 12/24, small bowel and visible colon viable, some inflammation of omentum in RUQ. Patient pending transfer to the floor but with emesis of food and repeat CBC in PM with Hgb 5.5 from 7.     #hematemesis  #UGIB - likely upper GI bleed, ddx includes PUD, esophagitis/gastritis/duodenitis; less likely is masses or dieulafoy lesion. No evidence of portal HTN to suggest varcies/portal HTN gastropathy as etiology.   - NGT with mostly food particles/ensure but blood tinged   - Hgb 5.7 from 7.1   - NPO at 10am 1/1     Recommendations:   - keep NPO   - IV PPI 80mg x1, followed by PPI gtt   - transfuse for Hgb > 8, post transfusion CBC   - discussed with brother and sister-in-law (not HCP) about plan; family believes that if there is a GIB that can be reversible, would like to pursue endoscopy with intubation, but will defer final decision to HCP, agreeable to supportive care for now    - if patient HD unstable with recurrent hematemesis, would recommend intubation if within GOC   - would defer AC to primary team given that patient likely needs to continue with DAPT for recent stent   - GOC with HCP today   - plan for endoscopy tomorrow AM if within GOC, or more emergent if necessary but patient will need to be intubated prior to scope. Per family at bedside, patient has been more lethargic and confused. Discussed with patient that devon given mental status, cannot guarantee that patient will be extubated immediately after procedure. Family will discuss and defer to HCP for final decision.   - rest of care per primary team     All recommendations are tentative until note is attested by attending.     Juany Victoria, PGY-5  Gastroenterology/Hepatology Fellow  Available on [employer] Teams  [phone] ([employer] Short Range Pager)  [phone] ([facility] Long Range Pager)    After 5pm, please contact the on-call GI fellow. [phone]

## 2024-01-01 NOTE — PROGRESS NOTE ADULT - ASSESSMENT
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 Surgery consulted on 12/24 for abdominal pain and distension. CTA AP obtained which showed  partially occlusive calcified and non-calcified plaque just distal to take-off of the SMA, with estimated at least 75% luminal narrowing. Limited evaluation of its distal branches. Patient taken emergently to OR on 12/24 with general surgery and vascular surgery. Patient s/p diagnostic laparoscopy and SMA stent placement with brachial cutdown. SICU consulted postoperatively for HD monitoring.     PLAN:   Neurologic:  -A&Ox2 (baseline per family)   -Continue Keppra- hx of myoclonic jerks   -Pain: Tylenol  -ASA 81 for hx CVA, stents  -melatonin 6 qhs  -lexapro 10  -memantine 5 bid    Respiratory:  -Maintain O2 saturation >92%  -COVID + (12/20)    Cardiovascular: htn  -MAP goal >65  -Metoprolol 50 bid and home ranolazine   -ASA 81 qd  -Lipitor 80    Gastrointestinal/Nutrition:  -Diet: Regular   -F/u RUQ US 12/26-> GB distended with cholelithiasis, equivocal   -Trial abx for cholecystitis, no plan for percutaneous cholecystostomy at this time  -HIDA technically positive, however patient had been NPO for several days and is non-tender      Genitourinary/Renal:  - Hx of CKD (Baseline Cr 1.2-1.3)   - Monitor strict I/Os     Hematologic:  -DVT ppx: SQH   -ASA 81 PO for hx stents, CVA x3  -Brilinta qd    Infectious Disease:  -Abx: Zosyn through 1/1, s/p remdesivir   -Monitor WBC   -Monitor fever curve     Endocrine:  -T2DM   -MISS, Lantus 14U, Lispro 5 premeal  -Monitor blood glucose     Disposition: Listed 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 Surgery consulted on 12/24 for abdominal pain and distension. CTA AP obtained which showed  partially occlusive calcified and non-calcified plaque just distal to take-off of the SMA, with estimated at least 75% luminal narrowing. Limited evaluation of its distal branches. Patient taken emergently to OR on 12/24 with general surgery and vascular surgery. Patient s/p diagnostic laparoscopy and SMA stent placement with brachial cutdown. SICU consulted postoperatively for HD monitoring.     PLAN:   Neurologic:  -A&Ox2 (baseline per family)   -Continue Keppra- hx of myoclonic jerks   -Pain: Tylenol  -ASA 81 for hx CVA, stents  -Melatonin 6 qhs  -Lexapro 10  -Memantine 5 BID  -Add Seroquel 25 mg qhs     Respiratory:  -Maintain O2 saturation >92%  -COVID + (12/20)    Cardiovascular: htn  -MAP goal >65  -Metoprolol 50 bid and home ranolazine   -ASA 81 qd  -Lipitor 80    Gastrointestinal/Nutrition:  -Diet: Regular   -F/u RUQ US 12/26-> GB distended with cholelithiasis, equivocal   -Trial abx for cholecystitis, no plan for percutaneous cholecystostomy at this time    Genitourinary/Renal:  - Hx of CKD (Baseline Cr 1.2-1.3)   - Monitor strict I/Os     Hematologic:  -DVT ppx: SQH   -ASA 81 PO for hx stents, CVA x3  -Brilinta qd    Infectious Disease:  -S/P Zosyn (through 1/1)   -Monitor WBC   -Monitor fever curve     Endocrine:  -T2DM   -MISS, Lantus 14U, Lispro 5 premeal  -Monitor blood glucose     Disposition: Listed

## 2024-01-01 NOTE — PROGRESS NOTE ADULT - SUBJECTIVE AND OBJECTIVE BOX
Aashish Boyer MD  Interventional Cardiology / Advance Heart Failure and Cardiac Transplant Specialist  [place] Office : [address] [place] [address] [place] [address]  Tel: [phone]  [place] Office : [address] [facility] [place] [address]  Tel: [phone]       Pt is lying in bed comfortable vomited today s/p NG tube   	  MEDICATIONS:  heparin   Injectable 5000 Unit(s) SubCutaneous every 8 hours  metoprolol tartrate Injectable 5 milliGRAM(s) IV Push every 6 hours  ticagrelor 60 milliGRAM(s) Oral every 12 hours  acetaminophen   IVPB .. 1000 milliGRAM(s) IV Intermittent every 6 hours  aspirin Suppository 300 milliGRAM(s) Rectal daily  levETIRAcetam   Injectable 250 milliGRAM(s) IV Push every 12 hours  metoclopramide Injectable 5 milliGRAM(s) IV Push every 6 hours  pantoprazole Infusion 8 mG/Hr IV Continuous <Continuous>  dextrose 50% Injectable 25 Gram(s) IV Push once  dextrose 50% Injectable 12.5 Gram(s) IV Push once  insulin glargine Injectable (LANTUS) 8 Unit(s) SubCutaneous at bedtime  insulin lispro (ADMELOG) corrective regimen sliding scale   SubCutaneous every 6 hours  lactated ringers. 1000 milliLiter(s) IV Continuous <Continuous>      PAST MEDICAL/SURGICAL HISTORY  PAST MEDICAL & SURGICAL HISTORY:  Hyperlipemia      Hypertension      Coronary Artery Disease      Diabetes Mellitus Type II      Stented Coronary Artery  total 5 stents, last stent 5/2019      Neuropathy      Myocardial infarction      Stroke  mild left facial numbness   no other residuals verbalized      Myoclonic jerking      Stage 3 chronic kidney disease      History of Cataract Extraction      Hx of CABG      H/O coronary angiogram      S/P coronary artery stent placement  1/6/09      S/P placement of cardiac pacemaker          SOCIAL HISTORY: Substance Use (street drugs): ( x ) never used  (  ) other:    FAMILY HISTORY:  No pertinent family history in first degree relatives      PHYSICAL EXAM:  T(C): 35.4 (01-01-24 @ 20:00), Max: 37.1 (01-01-24 @ 12:00)  HR: 73 (01-01-24 @ 16:00) (73 - 83)  BP: 109/44 (01-01-24 @ 16:00) (109/44 - 187/80)  RR: 17 (01-01-24 @ 16:00) (17 - 24)  SpO2: 100% (01-01-24 @ 16:00) (98% - 100%)  Wt(kg): --  I&O's Summary    31 Dec 2023 07:01  -  01 Jan 2024 07:00  --------------------------------------------------------  IN: 1505 mL / OUT: 800 mL / NET: 705 mL    01 Jan 2024 07:01  -  01 Jan 2024 21:02  --------------------------------------------------------  IN: 1270 mL / OUT: 700 mL / NET: 570 mL          GENERAL: s/p NG tube   EYES:   PERRLA   ENMT:   Moist mucous membranes, Good dentition, No lesions  Cardiovascular: Normal S1 S2, No JVD, No murmurs, No edema  Respiratory: Lungs clear to auscultation	  Gastrointestinal: s/p surgery   Extremities: no edema                                    5.5    15.57 )-----------( 336      ( 01 Jan 2024 15:30 )             15.6     01-01    135  |  103  |  43<H>  ----------------------------<  246<H>  4.6   |  17<L>  |  1.83<H>    Ca    7.6<L>      01 Jan 2024 15:30  Phos  2.5     01-01  Mg     2.50     01-01    TPro  4.2<L>  /  Alb  1.8<L>  /  TBili  0.4  /  DBili  <0.2  /  AST  32  /  ALT  16  /  AlkPhos  57  01-01    proBNP:   Lipid Profile:   HgA1c:   TSH:     Consultant(s) Notes Reviewed:  [x ] YES  [ ] NO    Care Discussed with Consultants/Other Providers [ x] YES  [ ] NO    Imaging Personally Reviewed independently:  [x] YES  [ ] NO    All labs, radiologic studies, vitals, orders and medications list reviewed. Patient is seen and examined at bedside. Case discussed with medical team.         Aashish Boyer MD  Interventional Cardiology / Advance Heart Failure and Cardiac Transplant Specialist  [place] Office : [phone] [place] [address] [place] [address]  Tel: [phone]  [place] Office : [address] [facility] [place] [address]  Tel: [phone]       Pt is lying in bed comfortable vomited today s/p NG tube   	  MEDICATIONS:  heparin   Injectable 5000 Unit(s) SubCutaneous every 8 hours  metoprolol tartrate Injectable 5 milliGRAM(s) IV Push every 6 hours  ticagrelor 60 milliGRAM(s) Oral every 12 hours  acetaminophen   IVPB .. 1000 milliGRAM(s) IV Intermittent every 6 hours  aspirin Suppository 300 milliGRAM(s) Rectal daily  levETIRAcetam   Injectable 250 milliGRAM(s) IV Push every 12 hours  metoclopramide Injectable 5 milliGRAM(s) IV Push every 6 hours  pantoprazole Infusion 8 mG/Hr IV Continuous <Continuous>  dextrose 50% Injectable 25 Gram(s) IV Push once  dextrose 50% Injectable 12.5 Gram(s) IV Push once  insulin glargine Injectable (LANTUS) 8 Unit(s) SubCutaneous at bedtime  insulin lispro (ADMELOG) corrective regimen sliding scale   SubCutaneous every 6 hours  lactated ringers. 1000 milliLiter(s) IV Continuous <Continuous>      PAST MEDICAL/SURGICAL HISTORY  PAST MEDICAL & SURGICAL HISTORY:  Hyperlipemia      Hypertension      Coronary Artery Disease      Diabetes Mellitus Type II      Stented Coronary Artery  total 5 stents, last stent 5/2019      Neuropathy      Myocardial infarction      Stroke  mild left facial numbness   no other residuals verbalized      Myoclonic jerking      Stage 3 chronic kidney disease      History of Cataract Extraction      Hx of CABG      H/O coronary angiogram      S/P coronary artery stent placement  1/6/09      S/P placement of cardiac pacemaker          SOCIAL HISTORY: Substance Use (street drugs): ( x ) never used  (  ) other:    FAMILY HISTORY:  No pertinent family history in first degree relatives      PHYSICAL EXAM:  T(C): 35.4 (01-01-24 @ 20:00), Max: 37.1 (01-01-24 @ 12:00)  HR: 73 (01-01-24 @ 16:00) (73 - 83)  BP: 109/44 (01-01-24 @ 16:00) (109/44 - 187/80)  RR: 17 (01-01-24 @ 16:00) (17 - 24)  SpO2: 100% (01-01-24 @ 16:00) (98% - 100%)  Wt(kg): --  I&O's Summary    31 Dec 2023 07:01  -  01 Jan 2024 07:00  --------------------------------------------------------  IN: 1505 mL / OUT: 800 mL / NET: 705 mL    01 Jan 2024 07:01  -  01 Jan 2024 21:02  --------------------------------------------------------  IN: 1270 mL / OUT: 700 mL / NET: 570 mL          GENERAL: s/p NG tube   EYES:   PERRLA   ENMT:   Moist mucous membranes, Good dentition, No lesions  Cardiovascular: Normal S1 S2, No JVD, No murmurs, No edema  Respiratory: Lungs clear to auscultation	  Gastrointestinal: s/p surgery   Extremities: no edema                                    5.5    15.57 )-----------( 336      ( 01 Jan 2024 15:30 )             15.6     01-01    135  |  103  |  43<H>  ----------------------------<  246<H>  4.6   |  17<L>  |  1.83<H>    Ca    7.6<L>      01 Jan 2024 15:30  Phos  2.5     01-01  Mg     2.50     01-01    TPro  4.2<L>  /  Alb  1.8<L>  /  TBili  0.4  /  DBili  <0.2  /  AST  32  /  ALT  16  /  AlkPhos  57  01-01    proBNP:   Lipid Profile:   HgA1c:   TSH:     Consultant(s) Notes Reviewed:  [x ] YES  [ ] NO    Care Discussed with Consultants/Other Providers [ x] YES  [ ] NO    Imaging Personally Reviewed independently:  [x] YES  [ ] NO    All labs, radiologic studies, vitals, orders and medications list reviewed. Patient is seen and examined at bedside. Case discussed with medical team.

## 2024-01-01 NOTE — CHART NOTE - NSCHARTNOTEFT_GEN_A_CORE
Seen and assessed with Dr. Rogers.   Pt was doing well over the weekend. Finding of acute cholecystitis being managed non-operatively by general surgery given his age.   Started on zosyn. No Per asa tube per IR for now. Pt did well and initially tolerated sips of CLD, CLD, and was advanced to pureed diet after  pt was evaluated by speech therapy. Tolerated advancement without abdominal pain. Remained HD normal at this time. LUE brachial cutdown site with bruising but no hematoma and   intact motor/sensory exam. Tolerated asa/brilinta.     However at 9-10 am this morning pt had nausea and vomiting. This was a mixed of food but also blood tinged.  Also had question of dark stool. Abd was more distended.   Remained HD normal. On assessment, abd softly distended but not firm with no TTP. NGT placed by SICU team evacuating 500 cc of food material. Also was blood tinged.   Repeat labs obtained showing H/H of 5/15 from 7.4/21 this AM.  Lactate 9. Again, HD normal. Discussed with Dr. Rogers and Dr. Haney. Given 2 units of blood. Will obtain GI consult for  potential EGD, Will repeat labs after transfusion including lactate. ?OF obtaining CTA abd pelvis to determine if sma stent placed remains patent given uptrending lactate  However, this at current point would probably best be explained by acute blood loss from UGBI. Will plan to repeat labs and lcatate and if the pt does not respond to resucitation obtain CTA at that time. Brilinta held as well.  Son was contacted and updated persoanlly. General surgery/SICU team also called the family to update

## 2024-01-01 NOTE — PROGRESS NOTE ADULT - SUBJECTIVE AND OBJECTIVE BOX
GENERAL SURGERY PROGRESS NOTE    Patient: SHAIK DONOHUE , 89y ([birth year])Male   MRN: [id number]  Location: [facility] 03  Visit: 12-23-23 Inpatient  Date: 01-01-24 @ 11:16    Subjective: Hypertensive overnight, got hydralazine and increased lopressor. Patient had emesis in AM, concern for aspiration, SICU placed NGT. Patient now resting comfortably in bed, no complaints.    PAST MEDICAL & SURGICAL HISTORY:  Hyperlipemia      Hypertension      Coronary Artery Disease      Diabetes Mellitus Type II      Stented Coronary Artery  total 5 stents, last stent 5/2019      Neuropathy      Myocardial infarction      Stroke  mild left facial numbness   no other residuals verbalized      Myoclonic jerking      Stage 3 chronic kidney disease      History of Cataract Extraction      Hx of CABG      H/O coronary angiogram      S/P coronary artery stent placement  1/6/09      S/P placement of cardiac pacemaker          Vitals: T(F): 97.4 (01-01-24 @ 08:00), Max: 99.8 (12-31-23 @ 16:00)  HR: 80 (01-01-24 @ 08:00)  BP: 148/63 (01-01-24 @ 08:00)  RR: 21 (01-01-24 @ 08:00)  SpO2: 100% (01-01-24 @ 08:00)      Diet, NPO      12-31-23 @ 07:01  -  01-01-24 @ 07:00  --------------------------------------------------------  IN:    IV PiggyBack: 525 mL    Oral Fluid: 980 mL  Total IN: 1505 mL    OUT:    Voided (mL): 800 mL  Total OUT: 800 mL    Total NET: 705 mL          PHYSICAL EXAM  GEN: No acute distress   CV: RRR, no JVD  LUNGS: Respirations unlabored, no use of accessory muscles. Cough.   ABD: Abdomen soft, NT, mildly distended   EXT: No peripheral edema. Regular range of motion.     MEDICATIONS  (STANDING):  acetaminophen   IVPB .. 1000 milliGRAM(s) IV Intermittent every 6 hours  aspirin Suppository 300 milliGRAM(s) Rectal daily  dextrose 50% Injectable 25 Gram(s) IV Push once  dextrose 50% Injectable 12.5 Gram(s) IV Push once  insulin glargine Injectable (LANTUS) 8 Unit(s) SubCutaneous at bedtime  insulin lispro (ADMELOG) corrective regimen sliding scale   SubCutaneous every 6 hours  levETIRAcetam   Injectable 250 milliGRAM(s) IV Push every 12 hours  metoclopramide Injectable 5 milliGRAM(s) IV Push every 6 hours  metoprolol tartrate Injectable 5 milliGRAM(s) IV Push every 6 hours  pantoprazole  Injectable 40 milliGRAM(s) IV Push daily  ticagrelor 60 milliGRAM(s) Oral every 12 hours    MEDICATIONS  (PRN):      DVT PROPHYLAXIS: SCDs,   GI PROPHYLAXIS: pantoprazole  Injectable 40 milliGRAM(s) IV Push daily    ANTICOAGULATION:   ANTIBIOTICS:       LAB/STUDIES:  CAPILLARY BLOOD GLUCOSE      POCT Blood Glucose.: 261 mg/dL (01 Jan 2024 07:49)  POCT Blood Glucose.: 241 mg/dL (31 Dec 2023 22:58)  POCT Blood Glucose.: 169 mg/dL (31 Dec 2023 16:25)  POCT Blood Glucose.: 217 mg/dL (31 Dec 2023 11:24)                          7.1    13.99 )-----------( 392      ( 01 Jan 2024 02:24 )             20.9     01-01    132<L>  |  99  |  33<H>  ----------------------------<  251<H>  4.4   |  17<L>  |  1.77<H>    Ca    8.0<L>      01 Jan 2024 02:24  Phos  2.9     01-01  Mg     2.40     01-01    TPro  6.3  /  Alb  2.9<L>  /  TBili  0.7  /  DBili  0.3  /  AST  27  /  ALT  15  /  AlkPhos  73  01-01               6.3  | 0.7  | 27       ------------------[73      ( 01 Jan 2024 02:24 )  2.9  | 0.3  | 15          Lipase:x      Amylase:x          Urinalysis Basic - ( 01 Jan 2024 02:24 )    Color: x / Appearance: x / SG: x / pH: x  Gluc: 251 mg/dL / Ketone: x  / Bili: x / Urobili: x   Blood: x / Protein: x / Nitrite: x   Leuk Esterase: x / RBC: x / WBC x   Sq Epi: x / Non Sq Epi: x / Bacteria: x      CARDIAC MARKERS ( 31 Dec 2023 14:46 )  x     / x     / 90 U/L / x     / 3.2 ng/mL  CARDIAC MARKERS ( 31 Dec 2023 11:23 )  x     / x     / 81 U/L / x     / 2.8 ng/mL     Assessment:   89M w/ PMHx CAD (s/p CABG, s/p stents on ASA/brillinta), s/p PPM, DM2, CKD (baseline Cr 1.2-1.3 as per family), PVD, HTN, HLD, CVA x3 (without residual deficits), and 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 CTA demonstrating mesenteric fat stranding associated with ascending/transverse colon. S/p SMA angiography with stent placement with diagnostic laparoscopy 12/24, small bowel and visible colon viable, some inflammation of omentum in RUQ.     Plan:   - Pain control PRN  - HIDA +, treating cholecystitis with medical management due to poor surgical candidate for lap asa   - Continue asa + brilinta   - NPO due to N/V   - awaiting transfer to floor  - Appreciate excellent care per SICU     C Team Surgery   [id number]   GENERAL SURGERY PROGRESS NOTE    Patient: SHAIK DONOHUE , 89y ([birth year])Male   MRN: [id number]  Location: [facility] 03  Visit: 12-23-23 Inpatient  Date: 01-01-24 @ 11:16    Subjective: Hypertensive overnight, got hydralazine and increased lopressor. Patient had emesis in AM, concern for aspiration, SICU placed NGT. Patient now resting comfortably in bed, no complaints.    PAST MEDICAL & SURGICAL HISTORY:  Hyperlipemia      Hypertension      Coronary Artery Disease      Diabetes Mellitus Type II      Stented Coronary Artery  total 5 stents, last stent 5/2019      Neuropathy      Myocardial infarction      Stroke  mild left facial numbness   no other residuals verbalized      Myoclonic jerking      Stage 3 chronic kidney disease      History of Cataract Extraction      Hx of CABG      H/O coronary angiogram      S/P coronary artery stent placement  1/6/09      S/P placement of cardiac pacemaker          Vitals: T(F): 97.4 (01-01-24 @ 08:00), Max: 99.8 (12-31-23 @ 16:00)  HR: 80 (01-01-24 @ 08:00)  BP: 148/63 (01-01-24 @ 08:00)  RR: 21 (01-01-24 @ 08:00)  SpO2: 100% (01-01-24 @ 08:00)      Diet, NPO      12-31-23 @ 07:01  -  01-01-24 @ 07:00  --------------------------------------------------------  IN:    IV PiggyBack: 525 mL    Oral Fluid: 980 mL  Total IN: 1505 mL    OUT:    Voided (mL): 800 mL  Total OUT: 800 mL    Total NET: 705 mL          PHYSICAL EXAM  GEN: No acute distress   CV: RRR, no JVD  LUNGS: Respirations unlabored, no use of accessory muscles. Cough.   ABD: Abdomen soft, NT, mildly distended   EXT: No peripheral edema. Regular range of motion.     MEDICATIONS  (STANDING):  acetaminophen   IVPB .. 1000 milliGRAM(s) IV Intermittent every 6 hours  aspirin Suppository 300 milliGRAM(s) Rectal daily  dextrose 50% Injectable 25 Gram(s) IV Push once  dextrose 50% Injectable 12.5 Gram(s) IV Push once  insulin glargine Injectable (LANTUS) 8 Unit(s) SubCutaneous at bedtime  insulin lispro (ADMELOG) corrective regimen sliding scale   SubCutaneous every 6 hours  levETIRAcetam   Injectable 250 milliGRAM(s) IV Push every 12 hours  metoclopramide Injectable 5 milliGRAM(s) IV Push every 6 hours  metoprolol tartrate Injectable 5 milliGRAM(s) IV Push every 6 hours  pantoprazole  Injectable 40 milliGRAM(s) IV Push daily  ticagrelor 60 milliGRAM(s) Oral every 12 hours    MEDICATIONS  (PRN):      DVT PROPHYLAXIS: SCDs,   GI PROPHYLAXIS: pantoprazole  Injectable 40 milliGRAM(s) IV Push daily    ANTICOAGULATION:   ANTIBIOTICS:       LAB/STUDIES:  CAPILLARY BLOOD GLUCOSE      POCT Blood Glucose.: 261 mg/dL (01 Jan 2024 07:49)  POCT Blood Glucose.: 241 mg/dL (31 Dec 2023 22:58)  POCT Blood Glucose.: 169 mg/dL (31 Dec 2023 16:25)  POCT Blood Glucose.: 217 mg/dL (31 Dec 2023 11:24)                          7.1    13.99 )-----------( 392      ( 01 Jan 2024 02:24 )             20.9     01-01    132<L>  |  99  |  33<H>  ----------------------------<  251<H>  4.4   |  17<L>  |  1.77<H>    Ca    8.0<L>      01 Jan 2024 02:24  Phos  2.9     01-01  Mg     2.40     01-01    TPro  6.3  /  Alb  2.9<L>  /  TBili  0.7  /  DBili  0.3  /  AST  27  /  ALT  15  /  AlkPhos  73  01-01               6.3  | 0.7  | 27       ------------------[73      ( 01 Jan 2024 02:24 )  2.9  | 0.3  | 15          Lipase:x      Amylase:x          Urinalysis Basic - ( 01 Jan 2024 02:24 )    Color: x / Appearance: x / SG: x / pH: x  Gluc: 251 mg/dL / Ketone: x  / Bili: x / Urobili: x   Blood: x / Protein: x / Nitrite: x   Leuk Esterase: x / RBC: x / WBC x   Sq Epi: x / Non Sq Epi: x / Bacteria: x      CARDIAC MARKERS ( 31 Dec 2023 14:46 )  x     / x     / 90 U/L / x     / 3.2 ng/mL  CARDIAC MARKERS ( 31 Dec 2023 11:23 )  x     / x     / 81 U/L / x     / 2.8 ng/mL     Assessment:   89M w/ PMHx CAD (s/p CABG, s/p stents on ASA/brillinta), s/p PPM, DM2, CKD (baseline Cr 1.2-1.3 as per family), PVD, HTN, HLD, CVA x3 (without residual deficits), and 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 CTA demonstrating mesenteric fat stranding associated with ascending/transverse colon. S/p SMA angiography with stent placement with diagnostic laparoscopy 12/24, small bowel and visible colon viable, some inflammation of omentum in RUQ.     Plan:   - Pain control PRN  - HIDA +, treating cholecystitis with medical management due to poor surgical candidate for lap asa   - Continue asa + brilinta   - NPO due to N/V   - awaiting transfer to floor  - Appreciate excellent care per SICU     C Team Surgery   [id number]

## 2024-01-01 NOTE — PROGRESS NOTE ADULT - ASSESSMENT
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     EKG SR RBBB (old per family     1) CAD s/p CABG  -p/w chest pain. EKG non ischemic , trop -sera   - echo with normal lv and moderate AS   - c/w metoprolol   - chest pains yesterday Trop mildly elevated and trending down likley sec to kidney disease,         2) Covid +  - s/p remdesivir   - c/w supportive management   - t/t per primary team     3) Abd distension   -CTA AP obtained which showed  partially occlusive calcified and non-calcified plaque just distal to take-off of the SMA, with estimated at least 75% luminal narrowing. Limited evaluation of its distal branches. Patient taken emergently to OR on 12/24 s/p diagnostic laparoscopy and SMA stent placement with brachial cutdown  -Surgery/vascular on board , f/u recs  - HIDA scan + for acute asa, medical management as poor surgical candidate cont zosyn

## 2024-01-02 LAB
ALBUMIN SERPL ELPH-MCNC: 2.6 G/DL — LOW (ref 3.3–5)
ALBUMIN SERPL ELPH-MCNC: 2.6 G/DL — LOW (ref 3.3–5)
ALP SERPL-CCNC: 60 U/L — SIGNIFICANT CHANGE UP (ref 40–120)
ALP SERPL-CCNC: 60 U/L — SIGNIFICANT CHANGE UP (ref 40–120)
ALT FLD-CCNC: 23 U/L — SIGNIFICANT CHANGE UP (ref 4–41)
ALT FLD-CCNC: 23 U/L — SIGNIFICANT CHANGE UP (ref 4–41)
ANION GAP SERPL CALC-SCNC: 11 MMOL/L — SIGNIFICANT CHANGE UP (ref 7–14)
ANION GAP SERPL CALC-SCNC: 11 MMOL/L — SIGNIFICANT CHANGE UP (ref 7–14)
AST SERPL-CCNC: 36 U/L — SIGNIFICANT CHANGE UP (ref 4–40)
AST SERPL-CCNC: 36 U/L — SIGNIFICANT CHANGE UP (ref 4–40)
BILIRUB DIRECT SERPL-MCNC: 0.4 MG/DL — HIGH (ref 0–0.3)
BILIRUB DIRECT SERPL-MCNC: 0.4 MG/DL — HIGH (ref 0–0.3)
BILIRUB INDIRECT FLD-MCNC: 0.5 MG/DL — SIGNIFICANT CHANGE UP (ref 0–1)
BILIRUB INDIRECT FLD-MCNC: 0.5 MG/DL — SIGNIFICANT CHANGE UP (ref 0–1)
BILIRUB SERPL-MCNC: 0.9 MG/DL — SIGNIFICANT CHANGE UP (ref 0.2–1.2)
BILIRUB SERPL-MCNC: 0.9 MG/DL — SIGNIFICANT CHANGE UP (ref 0.2–1.2)
BUN SERPL-MCNC: 46 MG/DL — HIGH (ref 7–23)
BUN SERPL-MCNC: 46 MG/DL — HIGH (ref 7–23)
CALCIUM SERPL-MCNC: 7.6 MG/DL — LOW (ref 8.4–10.5)
CALCIUM SERPL-MCNC: 7.6 MG/DL — LOW (ref 8.4–10.5)
CHLORIDE SERPL-SCNC: 103 MMOL/L — SIGNIFICANT CHANGE UP (ref 98–107)
CHLORIDE SERPL-SCNC: 103 MMOL/L — SIGNIFICANT CHANGE UP (ref 98–107)
CO2 SERPL-SCNC: 20 MMOL/L — LOW (ref 22–31)
CO2 SERPL-SCNC: 20 MMOL/L — LOW (ref 22–31)
CREAT SERPL-MCNC: 1.77 MG/DL — HIGH (ref 0.5–1.3)
CREAT SERPL-MCNC: 1.77 MG/DL — HIGH (ref 0.5–1.3)
EGFR: 36 ML/MIN/1.73M2 — LOW
EGFR: 36 ML/MIN/1.73M2 — LOW
GLUCOSE SERPL-MCNC: 204 MG/DL — HIGH (ref 70–99)
GLUCOSE SERPL-MCNC: 204 MG/DL — HIGH (ref 70–99)
HCT VFR BLD CALC: 19.5 % — CRITICAL LOW (ref 39–50)
HCT VFR BLD CALC: 19.5 % — CRITICAL LOW (ref 39–50)
HCT VFR BLD CALC: 19.8 % — CRITICAL LOW (ref 39–50)
HCT VFR BLD CALC: 19.8 % — CRITICAL LOW (ref 39–50)
HCT VFR BLD CALC: 26.3 % — LOW (ref 39–50)
HCT VFR BLD CALC: 26.3 % — LOW (ref 39–50)
HGB BLD-MCNC: 6.8 G/DL — CRITICAL LOW (ref 13–17)
HGB BLD-MCNC: 6.8 G/DL — CRITICAL LOW (ref 13–17)
HGB BLD-MCNC: 7 G/DL — CRITICAL LOW (ref 13–17)
HGB BLD-MCNC: 7 G/DL — CRITICAL LOW (ref 13–17)
HGB BLD-MCNC: 9 G/DL — LOW (ref 13–17)
HGB BLD-MCNC: 9 G/DL — LOW (ref 13–17)
MAGNESIUM SERPL-MCNC: 2.2 MG/DL — SIGNIFICANT CHANGE UP (ref 1.6–2.6)
MAGNESIUM SERPL-MCNC: 2.2 MG/DL — SIGNIFICANT CHANGE UP (ref 1.6–2.6)
MCHC RBC-ENTMCNC: 29.7 PG — SIGNIFICANT CHANGE UP (ref 27–34)
MCHC RBC-ENTMCNC: 29.7 PG — SIGNIFICANT CHANGE UP (ref 27–34)
MCHC RBC-ENTMCNC: 30 PG — SIGNIFICANT CHANGE UP (ref 27–34)
MCHC RBC-ENTMCNC: 30 PG — SIGNIFICANT CHANGE UP (ref 27–34)
MCHC RBC-ENTMCNC: 30.2 PG — SIGNIFICANT CHANGE UP (ref 27–34)
MCHC RBC-ENTMCNC: 30.2 PG — SIGNIFICANT CHANGE UP (ref 27–34)
MCHC RBC-ENTMCNC: 34.2 GM/DL — SIGNIFICANT CHANGE UP (ref 32–36)
MCHC RBC-ENTMCNC: 34.2 GM/DL — SIGNIFICANT CHANGE UP (ref 32–36)
MCHC RBC-ENTMCNC: 34.9 GM/DL — SIGNIFICANT CHANGE UP (ref 32–36)
MCHC RBC-ENTMCNC: 34.9 GM/DL — SIGNIFICANT CHANGE UP (ref 32–36)
MCHC RBC-ENTMCNC: 35.4 GM/DL — SIGNIFICANT CHANGE UP (ref 32–36)
MCHC RBC-ENTMCNC: 35.4 GM/DL — SIGNIFICANT CHANGE UP (ref 32–36)
MCV RBC AUTO: 85 FL — SIGNIFICANT CHANGE UP (ref 80–100)
MCV RBC AUTO: 85 FL — SIGNIFICANT CHANGE UP (ref 80–100)
MCV RBC AUTO: 86.7 FL — SIGNIFICANT CHANGE UP (ref 80–100)
MCV RBC AUTO: 86.7 FL — SIGNIFICANT CHANGE UP (ref 80–100)
MCV RBC AUTO: 86.8 FL — SIGNIFICANT CHANGE UP (ref 80–100)
MCV RBC AUTO: 86.8 FL — SIGNIFICANT CHANGE UP (ref 80–100)
NRBC # BLD: 0 /100 WBCS — SIGNIFICANT CHANGE UP (ref 0–0)
NRBC # BLD: 0 /100 WBCS — SIGNIFICANT CHANGE UP (ref 0–0)
NRBC # BLD: 2 /100 WBCS — HIGH (ref 0–0)
NRBC # FLD: 0.06 K/UL — HIGH (ref 0–0)
NRBC # FLD: 0.06 K/UL — HIGH (ref 0–0)
NRBC # FLD: 0.32 K/UL — HIGH (ref 0–0)
PHOSPHATE SERPL-MCNC: 2.2 MG/DL — LOW (ref 2.5–4.5)
PHOSPHATE SERPL-MCNC: 2.2 MG/DL — LOW (ref 2.5–4.5)
PLATELET # BLD AUTO: 275 K/UL — SIGNIFICANT CHANGE UP (ref 150–400)
PLATELET # BLD AUTO: 275 K/UL — SIGNIFICANT CHANGE UP (ref 150–400)
PLATELET # BLD AUTO: 288 K/UL — SIGNIFICANT CHANGE UP (ref 150–400)
PLATELET # BLD AUTO: 288 K/UL — SIGNIFICANT CHANGE UP (ref 150–400)
PLATELET # BLD AUTO: 298 K/UL — SIGNIFICANT CHANGE UP (ref 150–400)
PLATELET # BLD AUTO: 298 K/UL — SIGNIFICANT CHANGE UP (ref 150–400)
POTASSIUM SERPL-MCNC: 4 MMOL/L — SIGNIFICANT CHANGE UP (ref 3.5–5.3)
POTASSIUM SERPL-MCNC: 4 MMOL/L — SIGNIFICANT CHANGE UP (ref 3.5–5.3)
POTASSIUM SERPL-SCNC: 4 MMOL/L — SIGNIFICANT CHANGE UP (ref 3.5–5.3)
POTASSIUM SERPL-SCNC: 4 MMOL/L — SIGNIFICANT CHANGE UP (ref 3.5–5.3)
PROT SERPL-MCNC: 5.5 G/DL — LOW (ref 6–8.3)
PROT SERPL-MCNC: 5.5 G/DL — LOW (ref 6–8.3)
RBC # BLD: 2.25 M/UL — LOW (ref 4.2–5.8)
RBC # BLD: 2.25 M/UL — LOW (ref 4.2–5.8)
RBC # BLD: 2.33 M/UL — LOW (ref 4.2–5.8)
RBC # BLD: 2.33 M/UL — LOW (ref 4.2–5.8)
RBC # BLD: 3.03 M/UL — LOW (ref 4.2–5.8)
RBC # BLD: 3.03 M/UL — LOW (ref 4.2–5.8)
RBC # FLD: 14.6 % — HIGH (ref 10.3–14.5)
RBC # FLD: 14.6 % — HIGH (ref 10.3–14.5)
RBC # FLD: 15.9 % — HIGH (ref 10.3–14.5)
RH IG SCN BLD-IMP: POSITIVE — SIGNIFICANT CHANGE UP
RH IG SCN BLD-IMP: POSITIVE — SIGNIFICANT CHANGE UP
SODIUM SERPL-SCNC: 134 MMOL/L — LOW (ref 135–145)
SODIUM SERPL-SCNC: 134 MMOL/L — LOW (ref 135–145)
WBC # BLD: 13.91 K/UL — HIGH (ref 3.8–10.5)
WBC # BLD: 13.91 K/UL — HIGH (ref 3.8–10.5)
WBC # BLD: 13.93 K/UL — HIGH (ref 3.8–10.5)
WBC # BLD: 13.93 K/UL — HIGH (ref 3.8–10.5)
WBC # BLD: 16.29 K/UL — HIGH (ref 3.8–10.5)
WBC # BLD: 16.29 K/UL — HIGH (ref 3.8–10.5)
WBC # FLD AUTO: 13.91 K/UL — HIGH (ref 3.8–10.5)
WBC # FLD AUTO: 13.91 K/UL — HIGH (ref 3.8–10.5)
WBC # FLD AUTO: 13.93 K/UL — HIGH (ref 3.8–10.5)
WBC # FLD AUTO: 13.93 K/UL — HIGH (ref 3.8–10.5)
WBC # FLD AUTO: 16.29 K/UL — HIGH (ref 3.8–10.5)
WBC # FLD AUTO: 16.29 K/UL — HIGH (ref 3.8–10.5)

## 2024-01-02 PROCEDURE — 99231 SBSQ HOSP IP/OBS SF/LOW 25: CPT

## 2024-01-02 PROCEDURE — 43255 EGD CONTROL BLEEDING ANY: CPT | Mod: GC

## 2024-01-02 PROCEDURE — 99291 CRITICAL CARE FIRST HOUR: CPT

## 2024-01-02 PROCEDURE — 71045 X-RAY EXAM CHEST 1 VIEW: CPT | Mod: 26

## 2024-01-02 DEVICE — CLIP HEMO INSTINCT PLUS ENDOSCOPIC: Type: IMPLANTABLE DEVICE | Status: FUNCTIONAL

## 2024-01-02 RX ORDER — INSULIN GLARGINE 100 [IU]/ML
14 INJECTION, SOLUTION SUBCUTANEOUS AT BEDTIME
Refills: 0 | Status: DISCONTINUED | OUTPATIENT
Start: 2024-01-02 | End: 2024-01-20

## 2024-01-02 RX ORDER — HYDROMORPHONE HYDROCHLORIDE 2 MG/ML
0.5 INJECTION INTRAMUSCULAR; INTRAVENOUS; SUBCUTANEOUS ONCE
Refills: 0 | Status: DISCONTINUED | OUTPATIENT
Start: 2024-01-02 | End: 2024-01-02

## 2024-01-02 RX ORDER — VASOPRESSIN 20 [USP'U]/ML
0.03 INJECTION INTRAVENOUS
Qty: 40 | Refills: 0 | Status: DISCONTINUED | OUTPATIENT
Start: 2024-01-02 | End: 2024-01-02

## 2024-01-02 RX ORDER — ACETAMINOPHEN 500 MG
1000 TABLET ORAL EVERY 6 HOURS
Refills: 0 | Status: COMPLETED | OUTPATIENT
Start: 2024-01-02 | End: 2024-01-03

## 2024-01-02 RX ADMIN — Medication 5 MILLIGRAM(S): at 12:34

## 2024-01-02 RX ADMIN — Medication 5 MILLIGRAM(S): at 12:35

## 2024-01-02 RX ADMIN — INSULIN GLARGINE 14 UNIT(S): 100 INJECTION, SOLUTION SUBCUTANEOUS at 23:45

## 2024-01-02 RX ADMIN — HEPARIN SODIUM 5000 UNIT(S): 5000 INJECTION INTRAVENOUS; SUBCUTANEOUS at 05:18

## 2024-01-02 RX ADMIN — LEVETIRACETAM 250 MILLIGRAM(S): 250 TABLET, FILM COATED ORAL at 17:34

## 2024-01-02 RX ADMIN — PANTOPRAZOLE SODIUM 10 MG/HR: 20 TABLET, DELAYED RELEASE ORAL at 21:11

## 2024-01-02 RX ADMIN — Medication 5 MILLIGRAM(S): at 17:33

## 2024-01-02 RX ADMIN — HYDROMORPHONE HYDROCHLORIDE 0.5 MILLIGRAM(S): 2 INJECTION INTRAMUSCULAR; INTRAVENOUS; SUBCUTANEOUS at 09:00

## 2024-01-02 RX ADMIN — PANTOPRAZOLE SODIUM 10 MG/HR: 20 TABLET, DELAYED RELEASE ORAL at 08:59

## 2024-01-02 RX ADMIN — Medication 400 MILLIGRAM(S): at 05:20

## 2024-01-02 RX ADMIN — Medication 2: at 05:22

## 2024-01-02 RX ADMIN — SODIUM CHLORIDE 100 MILLILITER(S): 9 INJECTION, SOLUTION INTRAVENOUS at 21:12

## 2024-01-02 RX ADMIN — Medication 400 MILLIGRAM(S): at 18:02

## 2024-01-02 RX ADMIN — Medication 6: at 12:35

## 2024-01-02 RX ADMIN — Medication 4: at 17:39

## 2024-01-02 RX ADMIN — Medication 85 MILLIMOLE(S): at 07:44

## 2024-01-02 RX ADMIN — HYDROMORPHONE HYDROCHLORIDE 0.2 MILLIGRAM(S): 2 INJECTION INTRAMUSCULAR; INTRAVENOUS; SUBCUTANEOUS at 00:40

## 2024-01-02 RX ADMIN — Medication 1000 MILLIGRAM(S): at 05:50

## 2024-01-02 RX ADMIN — SODIUM CHLORIDE 100 MILLILITER(S): 9 INJECTION, SOLUTION INTRAVENOUS at 08:20

## 2024-01-02 RX ADMIN — Medication 5 MILLIGRAM(S): at 05:20

## 2024-01-02 RX ADMIN — Medication 1000 MILLIGRAM(S): at 00:04

## 2024-01-02 RX ADMIN — HYDROMORPHONE HYDROCHLORIDE 0.2 MILLIGRAM(S): 2 INJECTION INTRAMUSCULAR; INTRAVENOUS; SUBCUTANEOUS at 00:10

## 2024-01-02 RX ADMIN — Medication 400 MILLIGRAM(S): at 00:04

## 2024-01-02 RX ADMIN — Medication 5 MILLIGRAM(S): at 05:22

## 2024-01-02 RX ADMIN — LEVETIRACETAM 250 MILLIGRAM(S): 250 TABLET, FILM COATED ORAL at 05:19

## 2024-01-02 NOTE — PROGRESS NOTE ADULT - ASSESSMENT
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) who presents to the hospital for COVID19 infection and chest pain  MABLE   Hypophosphatemia   Hypertensive urgency      1 Renal - Creatinine stable, on LR   S/p CT with contrast   a/w Naphos 30 mmol IV x 1 this morning   2 CV - BP elevated, on Lopressor 5 mg IV q6h, consider clonidine patch 0.1 (he is NPO at present)  3 Vasc - s/p SMA stent placement;   4 Sx - s/p HIDA + for acute asa, medical management, NGT reinserted   5 GI - planning for EGD today;  Protonic gtt at present     Sayed [facility]   [phone]      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

## 2024-01-02 NOTE — PROGRESS NOTE ADULT - ASSESSMENT
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 Surgery consulted on 12/24 for abdominal pain and distension. CTA AP obtained which showed  partially occlusive calcified and non-calcified plaque just distal to take-off of the SMA, with estimated at least 75% luminal narrowing. Limited evaluation of its distal branches. Patient taken emergently to OR on 12/24 with general surgery and vascular surgery. Patient s/p diagnostic laparoscopy and SMA stent placement with brachial cutdown. SICU consulted postoperatively for HD monitoring. Course complicated by UGIB requiring 3u pRBCs on 1/1/24. Plan for EGD with GI on 1/2/24.    PLAN:   Neurologic:  -A&Ox2 (baseline per family)   -Continue Keppra- hx of myoclonic jerks   -Pain: Tylenol  -ASA 81 for hx CVA, stents  -Holding PO Lexapro/ Memantine    Respiratory:  -Maintain O2 saturation >92%  -COVID + (12/20)    Cardiovascular: htn  -MAP goal >65  -Metoprolol 5q6  -Rectal ASA    Gastrointestinal/Nutrition:  -Diet: NPO, NGT   -F/u RUQ US 12/26-> GB distended with cholelithiasis, equivocal   -Trial abx for cholecystitis, no plan for percutaneous cholecystostomy at this time    Genitourinary/Renal:  - Hx of CKD (Baseline Cr 1.2-1.3)   - Monitor strict I/Os     Hematologic:  -DVT ppx: SQH   -Rectal ASA for hx stents, CVA x3  -Brilinta qd- Holding since NPO     Infectious Disease:  -S/P Zosyn (through 1/1)   -Monitor WBC   -Monitor fever curve     Endocrine:  -T2DM   -Jeremiah HAYS 14U  -Monitor blood glucose     Disposition: Listed

## 2024-01-02 NOTE — PROGRESS NOTE ADULT - ASSESSMENT
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 CTA demonstrating mesenteric fat stranding associated with ascending/transverse colon. S/p SMA angiography with stent placement with diagnostic laparoscopy 12/24, small bowel and visible colon viable, some inflammation of omentum in RUQ. Patient now with GI bleed, pending scope with GI.     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 CTA demonstrating mesenteric fat stranding associated with ascending/transverse colon. S/p SMA angiography with stent placement with diagnostic laparoscopy 12/24, small bowel and visible colon viable, some inflammation of omentum in RUQ. Patient now with GI bleed, pending scope with GI.     Plan:   - Diet: NPO/NGT/IVF  - Pain control PRN  - protonix infusion   - f/u GI for possible scope today   - continue keppra  - HIDA +, treating cholecystitis with conservative management due to poor surgical candidate for lap asa   - holding ASA/Brillinta for GI bleed/possible scope with GI today  - Appreciate excellent care per SICU     C Team Surgery   [id number]

## 2024-01-02 NOTE — PROGRESS NOTE ADULT - SUBJECTIVE AND OBJECTIVE BOX
Interval Events:  -Midline placed  -PPI gtt, 80x1  -Post tx CBC stable at 7.8 from 5.5, 3rd unit blood ordered for tx goal >8 per GI  -Consented for blood transfusion  -Lantus increased from 8 to 10 qhs for elevated sugars  -EGD in AM with GI for UGIB  -CTA if lactate worsens  -F/u mesenteric duplex    NEURO  RASS (if intubated): 		[facility] ICU (if concern for delirium):  Exam: AOX2  Meds: acetaminophen   IVPB .. 1000 milliGRAM(s) IV Intermittent every 6 hours  aspirin Suppository 300 milliGRAM(s) Rectal daily  levETIRAcetam   Injectable 250 milliGRAM(s) IV Push every 12 hours  metoclopramide Injectable 5 milliGRAM(s) IV Push every 6 hours      RESPIRATORY  RR: 16 (01-01-24 @ 22:00) (15 - 24)  SpO2: 100% (01-01-24 @ 22:00) (100% - 100%)  Wt(kg): --  Exam: nonlabored respirations  Mechanical Ventilation:   ABG - ( 01 Jan 2024 14:00 )  pH: x     /  pCO2: x     /  pO2: x     / HCO3: x     / Base Excess: x     /  SaO2: x       Lactate: 9.0              Meds:     CARDIOVASCULAR  HR: 76 (01-01-24 @ 22:00) (73 - 83)  BP: 153/59 (01-01-24 @ 22:00) (109/44 - 154/75)  BP(mean): 87 (01-01-24 @ 22:00) (64 - 98)  ABP: --  ABP(mean): --  Wt(kg): --  CVP(cm H2O): --  VBG - ( 01 Jan 2024 20:47 )  pH: x     /  pCO2: x     /  pO2: x     / HCO3: x     / Base Excess: x     /  SaO2: x      Lactate: 2.6              Lactate, Blood: 9.0 mmol/L (01-01 @ 14:00)    Exam: regular rate  Meds: metoprolol tartrate Injectable 5 milliGRAM(s) IV Push every 6 hours      GI/NUTRITION  Exam: soft, mild tenderness, mild distention  Diet: NPO  Meds: pantoprazole Infusion 8 mG/Hr IV Continuous <Continuous>      GENITOURINARY  I&O's Detail    12-31 @ 07:01 - 01-01 @ 07:00  --------------------------------------------------------  IN:    IV PiggyBack: 525 mL    Oral Fluid: 980 mL  Total IN: 1505 mL    OUT:    Voided (mL): 800 mL  Total OUT: 800 mL    Total NET: 705 mL      01-01 @ 07:01 - 01-02 @ 00:23  --------------------------------------------------------  IN:    IV PiggyBack: 100 mL    Lactated Ringers: 900 mL    Oral Fluid: 300 mL    Pantoprazole: 30 mL    PRBCs (Packed Red Blood Cells): 270 mL  Total IN: 1600 mL    OUT:    Nasogastric/Oral tube (mL): 700 mL  Total OUT: 700 mL    Total NET: 900 mL          01-01    135  |  103  |  43<H>  ----------------------------<  246<H>  4.6   |  17<L>  |  1.83<H>    Ca    7.6<L>      01 Jan 2024 15:30  Phos  2.5     01-01  Mg     2.50     01-01    TPro  4.2<L>  /  Alb  1.8<L>  /  TBili  0.4  /  DBili  <0.2  /  AST  32  /  ALT  16  /  AlkPhos  57  01-01    Meds: lactated ringers. 1000 milliLiter(s) IV Continuous <Continuous>      HEMATOLOGIC  Meds: heparin   Injectable 5000 Unit(s) SubCutaneous every 8 hours  ticagrelor 60 milliGRAM(s) Oral every 12 hours                          7.8    13.36 )-----------( 301      ( 01 Jan 2024 20:47 )             22.7         INFECTIOUS DISEASES  T(C): 35.4 (01-01-24 @ 20:00), Max: 37.1 (01-01-24 @ 12:00)  Wt(kg): --  WBC Count: 13.36 K/uL (01-01 @ 20:47)  WBC Count: 15.57 K/uL (01-01 @ 15:30)  WBC Count: 12.79 K/uL (01-01 @ 14:00)  WBC Count: 13.99 K/uL (01-01 @ 02:24)    Recent Cultures:    Meds:     ENDOCRINE  Capillary Blood Glucose    Meds: dextrose 50% Injectable 25 Gram(s) IV Push once  dextrose 50% Injectable 12.5 Gram(s) IV Push once  insulin glargine Injectable (LANTUS) 10 Unit(s) SubCutaneous at bedtime  insulin lispro (ADMELOG) corrective regimen sliding scale   SubCutaneous every 6 hours    OTHER MEDICATIONS:      IMAGING:

## 2024-01-02 NOTE — PROGRESS NOTE ADULT - SUBJECTIVE AND OBJECTIVE BOX
NEPHROLOGY     Patient seen and examined with family at bedside, NGT reinserted yesterday, no sob, appears in no acute distress.     MEDICATIONS  (STANDING):  dextrose 50% Injectable 25 Gram(s) IV Push once  dextrose 50% Injectable 12.5 Gram(s) IV Push once  insulin glargine Injectable (LANTUS) 10 Unit(s) SubCutaneous at bedtime  insulin lispro (ADMELOG) corrective regimen sliding scale   SubCutaneous every 6 hours  lactated ringers. 1000 milliLiter(s) (100 mL/Hr) IV Continuous <Continuous>  levETIRAcetam   Injectable 250 milliGRAM(s) IV Push every 12 hours  metoclopramide Injectable 5 milliGRAM(s) IV Push every 6 hours  metoprolol tartrate Injectable 5 milliGRAM(s) IV Push every 6 hours  pantoprazole Infusion 8 mG/Hr (10 mL/Hr) IV Continuous <Continuous>    VITALS:  T(C): , Max: 37.1 (01-01-24 @ 12:00)  T(F): , Max: 98.8 (01-01-24 @ 12:00)  HR: 83 (01-02-24 @ 09:00)  BP: 175/74 (01-02-24 @ 09:00)  BP(mean): 100 (01-02-24 @ 09:00)  RR: 18 (01-02-24 @ 09:00)  SpO2: 100% (01-02-24 @ 09:00)    I and O's:    01-01 @ 07:01 - 01-02 @ 07:00  --------------------------------------------------------  IN: 2580 mL / OUT: 1501 mL / NET: 1079 mL    01-02 @ 07:01  -  01-02 @ 09:14  --------------------------------------------------------  IN: 110 mL / OUT: 0 mL / NET: 110 mL    PHYSICAL EXAM:  Constitutional: NAD.  HEENT: EOMI  Neck:  No JVD, supple   Respiratory: CTA B/L  Cardiovascular: S1 and S2, RRR  Gastrointestinal: soft,  distended   Extremities: No peripheral edema, + peripheral pulses  Neurological:   Psychiatric: Normal mood, normal affect  : no Moss  Skin: No rashes, C/D/I    LABS:                        9.0    13.93 )-----------( 298      ( 02 Jan 2024 01:33 )             26.3     01-02    134<L>  |  103  |  46<H>  ----------------------------<  204<H>  4.0   |  20<L>  |  1.77<H>    Ca    7.6<L>      02 Jan 2024 01:33  Phos  2.2     01-02  Mg     2.20     01-02    TPro  5.5<L>  /  Alb  2.6<L>  /  TBili  0.9  /  DBili  0.4<H>  /  AST  36  /  ALT  23  /  AlkPhos  60  01-02    RADIOLOGY & ADDITIONAL STUDIES:  rD< from: Xray Chest 1 View- PORTABLE-Urgent (Xray Chest 1 View- PORTABLE-Urgent .) (01.01.24 @ 10:19) >  IMPRESSION:  Enteric tube with tip overlying the distal stomach/proximal duodenum.    --- End of Report --      DEV LOOMIS MD; Resident Radiologist  This document has been electronically signed.  SOLO HERNANDEZ DO; Attending Radiologist  This document has been electronically signed. 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     MEDICATIONS  (STANDING):  dextrose 50% Injectable 25 Gram(s) IV Push once  dextrose 50% Injectable 12.5 Gram(s) IV Push once  insulin glargine Injectable (LANTUS) 10 Unit(s) SubCutaneous at bedtime  insulin lispro (ADMELOG) corrective regimen sliding scale   SubCutaneous every 6 hours  lactated ringers. 1000 milliLiter(s) (100 mL/Hr) IV Continuous <Continuous>  levETIRAcetam   Injectable 250 milliGRAM(s) IV Push every 12 hours  metoclopramide Injectable 5 milliGRAM(s) IV Push every 6 hours  metoprolol tartrate Injectable 5 milliGRAM(s) IV Push every 6 hours  pantoprazole Infusion 8 mG/Hr (10 mL/Hr) IV Continuous <Continuous>    VITALS:  T(C): , Max: 37.1 (01-01-24 @ 12:00)  T(F): , Max: 98.8 (01-01-24 @ 12:00)  HR: 83 (01-02-24 @ 09:00)  BP: 175/74 (01-02-24 @ 09:00)  BP(mean): 100 (01-02-24 @ 09:00)  RR: 18 (01-02-24 @ 09:00)  SpO2: 100% (01-02-24 @ 09:00)    I and O's:    01-01 @ 07:01 - 01-02 @ 07:00  --------------------------------------------------------  IN: 2580 mL / OUT: 1501 mL / NET: 1079 mL    01-02 @ 07:01  -  01-02 @ 09:14  --------------------------------------------------------  IN: 110 mL / OUT: 0 mL / NET: 110 mL    PHYSICAL EXAM:  Constitutional: NAD.  HEENT: +NGT   Neck:  No JVD, supple   Respiratory: CTA B/L  Cardiovascular: S1 and S2, RRR  Gastrointestinal: soft,  distended   Extremities: No peripheral edema, + peripheral pulses  Neurological:   Psychiatric: Normal mood, normal affect  : no Moss  Skin: No rashes, C/D/I    LABS:                        9.0    13.93 )-----------( 298      ( 02 Jan 2024 01:33 )             26.3     01-02    134<L>  |  103  |  46<H>  ----------------------------<  204<H>  4.0   |  20<L>  |  1.77<H>    Ca    7.6<L>      02 Jan 2024 01:33  Phos  2.2     01-02  Mg     2.20     01-02    TPro  5.5<L>  /  Alb  2.6<L>  /  TBili  0.9  /  DBili  0.4<H>  /  AST  36  /  ALT  23  /  AlkPhos  60  01-02    RADIOLOGY & ADDITIONAL STUDIES:  rD< from: Xray Chest 1 View- PORTABLE-Urgent (Xray Chest 1 View- PORTABLE-Urgent .) (01.01.24 @ 10:19) >  IMPRESSION:  Enteric tube with tip overlying the distal stomach/proximal duodenum.    --- End of Report --      DEV LOOMIS MD; Resident Radiologist  This document has been electronically signed.  SOLO HERNANDEZ DO; Attending Radiologist  This document has been electronically signed. 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     MEDICATIONS  (STANDING):  dextrose 50% Injectable 25 Gram(s) IV Push once  dextrose 50% Injectable 12.5 Gram(s) IV Push once  insulin glargine Injectable (LANTUS) 10 Unit(s) SubCutaneous at bedtime  insulin lispro (ADMELOG) corrective regimen sliding scale   SubCutaneous every 6 hours  lactated ringers. 1000 milliLiter(s) (100 mL/Hr) IV Continuous <Continuous>  levETIRAcetam   Injectable 250 milliGRAM(s) IV Push every 12 hours  metoclopramide Injectable 5 milliGRAM(s) IV Push every 6 hours  metoprolol tartrate Injectable 5 milliGRAM(s) IV Push every 6 hours  pantoprazole Infusion 8 mG/Hr (10 mL/Hr) IV Continuous <Continuous>    VITALS:  T(C): , Max: 37.1 (01-01-24 @ 12:00)  T(F): , Max: 98.8 (01-01-24 @ 12:00)  HR: 83 (01-02-24 @ 09:00)  BP: 175/74 (01-02-24 @ 09:00)  BP(mean): 100 (01-02-24 @ 09:00)  RR: 18 (01-02-24 @ 09:00)  SpO2: 100% (01-02-24 @ 09:00)    I and O's:    01-01 @ 07:01 - 01-02 @ 07:00  --------------------------------------------------------  IN: 2580 mL / OUT: 1501 mL / NET: 1079 mL    01-02 @ 07:01  -  01-02 @ 09:14  --------------------------------------------------------  IN: 110 mL / OUT: 0 mL / NET: 110 mL    PHYSICAL EXAM:  Constitutional: NAD.  HEENT: +NGT   Neck:  No JVD, supple   Respiratory: CTA B/L  Cardiovascular: S1 and S2, RRR  Gastrointestinal: soft,  distended   Extremities: No peripheral edema, + peripheral pulses  Neurological:   Psychiatric: Normal mood, normal affect  : no Moss  Skin: No rashes, C/D/I    LABS:                        9.0    13.93 )-----------( 298      ( 02 Jan 2024 01:33 )             26.3     01-02    134<L>  |  103  |  46<H>  ----------------------------<  204<H>  4.0   |  20<L>  |  1.77<H>    Ca    7.6<L>      02 Jan 2024 01:33  Phos  2.2     01-02  Mg     2.20     01-02    TPro  5.5<L>  /  Alb  2.6<L>  /  TBili  0.9  /  DBili  0.4<H>  /  AST  36  /  ALT  23  /  AlkPhos  60  01-02    RADIOLOGY & ADDITIONAL STUDIES:  rD< from: Xray Chest 1 View- PORTABLE-Urgent (Xray Chest 1 View- PORTABLE-Urgent .) (01.01.24 @ 10:19) >  IMPRESSION:  Enteric tube with tip overlying the distal stomach/proximal duodenum.    --- End of Report --      DEV LOOMIS MD; Resident Radiologist  This document has been electronically signed.  SOLO HERNANDEZ DO; Attending Radiologist  This document has been electronically signed. Jan 1 2024 10:24AM    < end of copied text >

## 2024-01-02 NOTE — SWALLOW BEDSIDE ASSESSMENT ADULT - COMMENTS
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 CTA demonstrating mesenteric fat stranding associated with ascending/transverse colon. S/p SMA angiography with stent placement with diagnostic laparoscopy 12/24, small bowel and visible colon viable, some inflammation of omentum in RUQ. Patient now with GI bleed, pending scope with GI.   Plan:   - Diet: NPO/NGT/IVF  - Pain control PRN  - protonix infusion   - f/u GI for possible scope today..." (see full note for details)     As discussed with Vascular and SICU Team, swallow evaluation deferred at this time given patient is NPO pending EGD today. This department to follow up tomorrow, 1/3/24 as schedule permits.

## 2024-01-02 NOTE — PROGRESS NOTE ADULT - ATTENDING COMMENTS
Pt is hemodynamically stable   responded to transfusion   lactate is down   will follow up GI re endoscopy

## 2024-01-02 NOTE — PROGRESS NOTE ADULT - SUBJECTIVE AND OBJECTIVE BOX
Vascular Surgery Daily Progress Note  =====================================================    SUBJECTIVE: Patient seen and examined at bedside on AM rounds. See SICU note for interval/overnight events.    PAST MEDICAL & SURGICAL HISTORY:  Hyperlipemia  Hypertension  Coronary Artery Diseas  Diabetes Mellitus Type II  Stented Coronary Artery  total 5 stents, last stent 5/2019  Neuropathy  Myocardial infarction  Stroke  mild left facial numbness   no other residuals verbalized  Myoclonic jerking  Stage 3 chronic kidney disease  History of Cataract Extraction  Hx of CABG  H/O coronary angiogram  S/P coronary artery stent placement  1/6/09  S/P placement of cardiac pacemaker        ALLERGIES:  fluoroquinolone antibiotics (Other)  Cipro (Unknown)  Tegretol (Unknown)  carbamazepine (Other)    --------------------------------------------------------------------------------------    MEDICATIONS:    Neurologic Medications  levETIRAcetam   Injectable 250 milliGRAM(s) IV Push every 12 hours  metoclopramide Injectable 5 milliGRAM(s) IV Push every 6 hours    Respiratory Medications    Cardiovascular Medications  metoprolol tartrate Injectable 5 milliGRAM(s) IV Push every 6 hours    Gastrointestinal Medications  lactated ringers. 1000 milliLiter(s) IV Continuous <Continuous>  pantoprazole Infusion 8 mG/Hr IV Continuous <Continuous>    Genitourinary Medications    Hematologic/Oncologic Medications    Antimicrobial/Immunologic Medications    Endocrine/Metabolic Medications  dextrose 50% Injectable 25 Gram(s) IV Push once  dextrose 50% Injectable 12.5 Gram(s) IV Push once  insulin glargine Injectable (LANTUS) 10 Unit(s) SubCutaneous at bedtime  insulin lispro (ADMELOG) corrective regimen sliding scale   SubCutaneous every 6 hours    Topical/Other Medications    --------------------------------------------------------------------------------------    VITAL SIGNS:  T(C): 36 (01-02-24 @ 04:00), Max: 37.1 (01-01-24 @ 12:00)  HR: 80 (01-02-24 @ 05:00) (72 - 81)  BP: 194/73 (01-02-24 @ 05:00) (109/44 - 194/73)  RR: 20 (01-02-24 @ 05:00) (13 - 24)  SpO2: 100% (01-02-24 @ 05:00) (100% - 100%)  --------------------------------------------------------------------------------------    EXAM    General: NAD, resting in bed comfortably.  Cardiac: regular rate  Respiratory: Nonlabored respirations, normal cw expansion.  Abdomen: soft, softly distended, mildly tender NGT in place with blood-tinged output.   Extremities: moving extremities    --------------------------------------------------------------------------------------    LABS                          9.0    13.93 )-----------( 298      ( 02 Jan 2024 01:33 )             26.3     01-02    134<L>  |  103  |  46<H>  ----------------------------<  204<H>  4.0   |  20<L>  |  1.77<H>    Ca    7.6<L>      02 Jan 2024 01:33  Phos  2.2     01-02  Mg     2.20     01-02    TPro  5.5<L>  /  Alb  2.6<L>  /  TBili  0.9  /  DBili  0.4<H>  /  AST  36  /  ALT  23  /  AlkPhos  60  01-02    --------------------------------------------------------------------------------------      I&Os:    01-01-24 @ 07:01  -  01-02-24 @ 07:00  --------------------------------------------------------  IN: 2580 mL / OUT: 1501 mL / NET: 1079 mL        --------------------------------------------------------------------------------------

## 2024-01-02 NOTE — PROGRESS NOTE ADULT - ATTENDING COMMENTS
I agree with the detailed interval history, physical, and plan, which I have reviewed and edited where appropriate'; also agree with notes/assessment with my team on service.  I have personally examined the patient.  I was physically present for the key portions of the evaluation and management (E/M) service provided.  I reviewed all the pertinent data.  The patient is a critical care patient with life threatening hemodynamic and metabolic instability in SICU.  The SICU team has a constant risk benefit analyzes discussion and coordinating care with the primary team and all consultants.   The patient is in SICU with the chief complaint and diagnosis mentioned in the note.   The plan will be specified in the note.  89M with history of CAD s/p CABG s/p stents; s/p diagnostic laparoscopy and SMA stent placement with brachial cutdown in SICU for HD monitoring  complicated by UGIB; scheduled for EGD.  Awake  Lung clear  Heart   PLAN:   Neurologic:  - Keppra  -Tylenol  -ASA  Respiratory:  -Maintain O2 saturation >92%  Cardiovascular:   -MAP goal >65  -Metoprolol  Gastrointestinal/Nutrition:  -Diet: NPO, NGT   Genitourinary/Renal:  - Monitor strict I/Os-Uo  Hematologic:  -SQH   Infectious Disease:  -Monitor fever curve   Endocrine:  -T2DM   -Jeremiah HAYS 14U  -Monitor glucose     Disposition: SICU-GI

## 2024-01-03 LAB
ALBUMIN SERPL ELPH-MCNC: 2.7 G/DL — LOW (ref 3.3–5)
ALBUMIN SERPL ELPH-MCNC: 2.7 G/DL — LOW (ref 3.3–5)
ALP SERPL-CCNC: 56 U/L — SIGNIFICANT CHANGE UP (ref 40–120)
ALP SERPL-CCNC: 56 U/L — SIGNIFICANT CHANGE UP (ref 40–120)
ALT FLD-CCNC: 27 U/L — SIGNIFICANT CHANGE UP (ref 4–41)
ALT FLD-CCNC: 27 U/L — SIGNIFICANT CHANGE UP (ref 4–41)
ANION GAP SERPL CALC-SCNC: 13 MMOL/L — SIGNIFICANT CHANGE UP (ref 7–14)
ANION GAP SERPL CALC-SCNC: 13 MMOL/L — SIGNIFICANT CHANGE UP (ref 7–14)
APTT BLD: 33.1 SEC — SIGNIFICANT CHANGE UP (ref 24.5–35.6)
APTT BLD: 33.1 SEC — SIGNIFICANT CHANGE UP (ref 24.5–35.6)
AST SERPL-CCNC: 39 U/L — SIGNIFICANT CHANGE UP (ref 4–40)
AST SERPL-CCNC: 39 U/L — SIGNIFICANT CHANGE UP (ref 4–40)
BILIRUB SERPL-MCNC: 0.9 MG/DL — SIGNIFICANT CHANGE UP (ref 0.2–1.2)
BILIRUB SERPL-MCNC: 0.9 MG/DL — SIGNIFICANT CHANGE UP (ref 0.2–1.2)
BUN SERPL-MCNC: 39 MG/DL — HIGH (ref 7–23)
BUN SERPL-MCNC: 39 MG/DL — HIGH (ref 7–23)
CALCIUM SERPL-MCNC: 7.8 MG/DL — LOW (ref 8.4–10.5)
CALCIUM SERPL-MCNC: 7.8 MG/DL — LOW (ref 8.4–10.5)
CHLORIDE SERPL-SCNC: 107 MMOL/L — SIGNIFICANT CHANGE UP (ref 98–107)
CHLORIDE SERPL-SCNC: 107 MMOL/L — SIGNIFICANT CHANGE UP (ref 98–107)
CO2 SERPL-SCNC: 21 MMOL/L — LOW (ref 22–31)
CO2 SERPL-SCNC: 21 MMOL/L — LOW (ref 22–31)
CREAT SERPL-MCNC: 1.56 MG/DL — HIGH (ref 0.5–1.3)
CREAT SERPL-MCNC: 1.56 MG/DL — HIGH (ref 0.5–1.3)
EGFR: 42 ML/MIN/1.73M2 — LOW
EGFR: 42 ML/MIN/1.73M2 — LOW
GLUCOSE SERPL-MCNC: 191 MG/DL — HIGH (ref 70–99)
GLUCOSE SERPL-MCNC: 191 MG/DL — HIGH (ref 70–99)
HCT VFR BLD CALC: 24.8 % — LOW (ref 39–50)
HCT VFR BLD CALC: 24.8 % — LOW (ref 39–50)
HCT VFR BLD CALC: 27.7 % — LOW (ref 39–50)
HCT VFR BLD CALC: 27.7 % — LOW (ref 39–50)
HGB BLD-MCNC: 9 G/DL — LOW (ref 13–17)
HGB BLD-MCNC: 9 G/DL — LOW (ref 13–17)
HGB BLD-MCNC: 9.5 G/DL — LOW (ref 13–17)
HGB BLD-MCNC: 9.5 G/DL — LOW (ref 13–17)
INR BLD: 1.16 RATIO — SIGNIFICANT CHANGE UP (ref 0.85–1.18)
INR BLD: 1.16 RATIO — SIGNIFICANT CHANGE UP (ref 0.85–1.18)
MAGNESIUM SERPL-MCNC: 2 MG/DL — SIGNIFICANT CHANGE UP (ref 1.6–2.6)
MAGNESIUM SERPL-MCNC: 2 MG/DL — SIGNIFICANT CHANGE UP (ref 1.6–2.6)
MCHC RBC-ENTMCNC: 29.6 PG — SIGNIFICANT CHANGE UP (ref 27–34)
MCHC RBC-ENTMCNC: 29.6 PG — SIGNIFICANT CHANGE UP (ref 27–34)
MCHC RBC-ENTMCNC: 30.3 PG — SIGNIFICANT CHANGE UP (ref 27–34)
MCHC RBC-ENTMCNC: 30.3 PG — SIGNIFICANT CHANGE UP (ref 27–34)
MCHC RBC-ENTMCNC: 34.3 GM/DL — SIGNIFICANT CHANGE UP (ref 32–36)
MCHC RBC-ENTMCNC: 34.3 GM/DL — SIGNIFICANT CHANGE UP (ref 32–36)
MCHC RBC-ENTMCNC: 36.3 GM/DL — HIGH (ref 32–36)
MCHC RBC-ENTMCNC: 36.3 GM/DL — HIGH (ref 32–36)
MCV RBC AUTO: 83.5 FL — SIGNIFICANT CHANGE UP (ref 80–100)
MCV RBC AUTO: 83.5 FL — SIGNIFICANT CHANGE UP (ref 80–100)
MCV RBC AUTO: 86.3 FL — SIGNIFICANT CHANGE UP (ref 80–100)
MCV RBC AUTO: 86.3 FL — SIGNIFICANT CHANGE UP (ref 80–100)
NRBC # BLD: 2 /100 WBCS — HIGH (ref 0–0)
NRBC # FLD: 0.31 K/UL — HIGH (ref 0–0)
NRBC # FLD: 0.31 K/UL — HIGH (ref 0–0)
NRBC # FLD: 0.33 K/UL — HIGH (ref 0–0)
NRBC # FLD: 0.33 K/UL — HIGH (ref 0–0)
PHOSPHATE SERPL-MCNC: 2.8 MG/DL — SIGNIFICANT CHANGE UP (ref 2.5–4.5)
PHOSPHATE SERPL-MCNC: 2.8 MG/DL — SIGNIFICANT CHANGE UP (ref 2.5–4.5)
PLATELET # BLD AUTO: 254 K/UL — SIGNIFICANT CHANGE UP (ref 150–400)
PLATELET # BLD AUTO: 254 K/UL — SIGNIFICANT CHANGE UP (ref 150–400)
PLATELET # BLD AUTO: 276 K/UL — SIGNIFICANT CHANGE UP (ref 150–400)
PLATELET # BLD AUTO: 276 K/UL — SIGNIFICANT CHANGE UP (ref 150–400)
POTASSIUM SERPL-MCNC: 4 MMOL/L — SIGNIFICANT CHANGE UP (ref 3.5–5.3)
POTASSIUM SERPL-MCNC: 4 MMOL/L — SIGNIFICANT CHANGE UP (ref 3.5–5.3)
POTASSIUM SERPL-SCNC: 4 MMOL/L — SIGNIFICANT CHANGE UP (ref 3.5–5.3)
POTASSIUM SERPL-SCNC: 4 MMOL/L — SIGNIFICANT CHANGE UP (ref 3.5–5.3)
PROT SERPL-MCNC: 5.5 G/DL — LOW (ref 6–8.3)
PROT SERPL-MCNC: 5.5 G/DL — LOW (ref 6–8.3)
PROTHROM AB SERPL-ACNC: 12.9 SEC — SIGNIFICANT CHANGE UP (ref 9.5–13)
PROTHROM AB SERPL-ACNC: 12.9 SEC — SIGNIFICANT CHANGE UP (ref 9.5–13)
RBC # BLD: 2.97 M/UL — LOW (ref 4.2–5.8)
RBC # BLD: 2.97 M/UL — LOW (ref 4.2–5.8)
RBC # BLD: 3.21 M/UL — LOW (ref 4.2–5.8)
RBC # BLD: 3.21 M/UL — LOW (ref 4.2–5.8)
RBC # FLD: 15.8 % — HIGH (ref 10.3–14.5)
RBC # FLD: 15.8 % — HIGH (ref 10.3–14.5)
RBC # FLD: 17.8 % — HIGH (ref 10.3–14.5)
RBC # FLD: 17.8 % — HIGH (ref 10.3–14.5)
SODIUM SERPL-SCNC: 141 MMOL/L — SIGNIFICANT CHANGE UP (ref 135–145)
SODIUM SERPL-SCNC: 141 MMOL/L — SIGNIFICANT CHANGE UP (ref 135–145)
WBC # BLD: 14.38 K/UL — HIGH (ref 3.8–10.5)
WBC # BLD: 14.38 K/UL — HIGH (ref 3.8–10.5)
WBC # BLD: 14.81 K/UL — HIGH (ref 3.8–10.5)
WBC # BLD: 14.81 K/UL — HIGH (ref 3.8–10.5)
WBC # FLD AUTO: 14.38 K/UL — HIGH (ref 3.8–10.5)
WBC # FLD AUTO: 14.38 K/UL — HIGH (ref 3.8–10.5)
WBC # FLD AUTO: 14.81 K/UL — HIGH (ref 3.8–10.5)
WBC # FLD AUTO: 14.81 K/UL — HIGH (ref 3.8–10.5)

## 2024-01-03 PROCEDURE — 99231 SBSQ HOSP IP/OBS SF/LOW 25: CPT

## 2024-01-03 PROCEDURE — 93978 VASCULAR STUDY: CPT | Mod: 26

## 2024-01-03 PROCEDURE — 99232 SBSQ HOSP IP/OBS MODERATE 35: CPT

## 2024-01-03 PROCEDURE — 99291 CRITICAL CARE FIRST HOUR: CPT

## 2024-01-03 PROCEDURE — 93010 ELECTROCARDIOGRAM REPORT: CPT

## 2024-01-03 RX ORDER — RANOLAZINE 500 MG/1
500 TABLET, FILM COATED, EXTENDED RELEASE ORAL
Refills: 0 | Status: DISCONTINUED | OUTPATIENT
Start: 2024-01-03 | End: 2024-01-24

## 2024-01-03 RX ORDER — HEPARIN SODIUM 5000 [USP'U]/ML
5000 INJECTION INTRAVENOUS; SUBCUTANEOUS EVERY 8 HOURS
Refills: 0 | Status: DISCONTINUED | OUTPATIENT
Start: 2024-01-03 | End: 2024-01-31

## 2024-01-03 RX ORDER — METOPROLOL TARTRATE 50 MG
25 TABLET ORAL
Refills: 0 | Status: DISCONTINUED | OUTPATIENT
Start: 2024-01-03 | End: 2024-01-04

## 2024-01-03 RX ORDER — LEVETIRACETAM 250 MG/1
250 TABLET, FILM COATED ORAL
Refills: 0 | Status: DISCONTINUED | OUTPATIENT
Start: 2024-01-03 | End: 2024-01-04

## 2024-01-03 RX ORDER — SUCRALFATE 1 G
1 TABLET ORAL EVERY 12 HOURS
Refills: 0 | Status: DISCONTINUED | OUTPATIENT
Start: 2024-01-03 | End: 2024-01-23

## 2024-01-03 RX ORDER — DORNASE ALFA 1 MG/ML
2.5 SOLUTION RESPIRATORY (INHALATION) EVERY 12 HOURS
Refills: 0 | Status: DISCONTINUED | OUTPATIENT
Start: 2024-01-03 | End: 2024-01-11

## 2024-01-03 RX ORDER — HYDROMORPHONE HYDROCHLORIDE 2 MG/ML
0.2 INJECTION INTRAMUSCULAR; INTRAVENOUS; SUBCUTANEOUS ONCE
Refills: 0 | Status: DISCONTINUED | OUTPATIENT
Start: 2024-01-03 | End: 2024-01-03

## 2024-01-03 RX ORDER — MOMETASONE FUROATE 220 UG/1
2 INHALANT RESPIRATORY (INHALATION) EVERY 12 HOURS
Refills: 0 | Status: DISCONTINUED | OUTPATIENT
Start: 2024-01-03 | End: 2024-01-20

## 2024-01-03 RX ORDER — INSULIN LISPRO 100/ML
VIAL (ML) SUBCUTANEOUS
Refills: 0 | Status: DISCONTINUED | OUTPATIENT
Start: 2024-01-03 | End: 2024-01-04

## 2024-01-03 RX ORDER — MOMETASONE FUROATE 220 UG/1
1 INHALANT RESPIRATORY (INHALATION) DAILY
Refills: 0 | Status: DISCONTINUED | OUTPATIENT
Start: 2024-01-03 | End: 2024-01-03

## 2024-01-03 RX ORDER — GABAPENTIN 400 MG/1
100 CAPSULE ORAL
Refills: 0 | Status: DISCONTINUED | OUTPATIENT
Start: 2024-01-03 | End: 2024-01-06

## 2024-01-03 RX ORDER — MEMANTINE HYDROCHLORIDE 10 MG/1
5 TABLET ORAL
Refills: 0 | Status: DISCONTINUED | OUTPATIENT
Start: 2024-01-03 | End: 2024-01-19

## 2024-01-03 RX ORDER — HYDRALAZINE HCL 50 MG
10 TABLET ORAL ONCE
Refills: 0 | Status: COMPLETED | OUTPATIENT
Start: 2024-01-03 | End: 2024-01-03

## 2024-01-03 RX ORDER — MOMETASONE FUROATE 220 UG/1
2 INHALANT RESPIRATORY (INHALATION) DAILY
Refills: 0 | Status: DISCONTINUED | OUTPATIENT
Start: 2024-01-03 | End: 2024-01-03

## 2024-01-03 RX ORDER — ESCITALOPRAM OXALATE 10 MG/1
10 TABLET, FILM COATED ORAL DAILY
Refills: 0 | Status: DISCONTINUED | OUTPATIENT
Start: 2024-01-03 | End: 2024-01-24

## 2024-01-03 RX ORDER — IPRATROPIUM/ALBUTEROL SULFATE 18-103MCG
3 AEROSOL WITH ADAPTER (GRAM) INHALATION ONCE
Refills: 0 | Status: COMPLETED | OUTPATIENT
Start: 2024-01-03 | End: 2024-01-03

## 2024-01-03 RX ORDER — IPRATROPIUM/ALBUTEROL SULFATE 18-103MCG
3 AEROSOL WITH ADAPTER (GRAM) INHALATION EVERY 12 HOURS
Refills: 0 | Status: DISCONTINUED | OUTPATIENT
Start: 2024-01-03 | End: 2024-01-17

## 2024-01-03 RX ORDER — SODIUM CHLORIDE 9 MG/ML
1000 INJECTION INTRAMUSCULAR; INTRAVENOUS; SUBCUTANEOUS
Refills: 0 | Status: DISCONTINUED | OUTPATIENT
Start: 2024-01-03 | End: 2024-01-04

## 2024-01-03 RX ADMIN — Medication 2: at 13:01

## 2024-01-03 RX ADMIN — Medication 5 MILLIGRAM(S): at 13:01

## 2024-01-03 RX ADMIN — HYDROMORPHONE HYDROCHLORIDE 0.2 MILLIGRAM(S): 2 INJECTION INTRAMUSCULAR; INTRAVENOUS; SUBCUTANEOUS at 00:55

## 2024-01-03 RX ADMIN — PANTOPRAZOLE SODIUM 10 MG/HR: 20 TABLET, DELAYED RELEASE ORAL at 09:43

## 2024-01-03 RX ADMIN — Medication 1000 MILLIGRAM(S): at 19:03

## 2024-01-03 RX ADMIN — Medication 5 MILLIGRAM(S): at 00:33

## 2024-01-03 RX ADMIN — Medication 3 MILLILITER(S): at 09:34

## 2024-01-03 RX ADMIN — HYDROMORPHONE HYDROCHLORIDE 0.2 MILLIGRAM(S): 2 INJECTION INTRAMUSCULAR; INTRAVENOUS; SUBCUTANEOUS at 01:25

## 2024-01-03 RX ADMIN — Medication 3 MILLILITER(S): at 23:00

## 2024-01-03 RX ADMIN — Medication 400 MILLIGRAM(S): at 00:34

## 2024-01-03 RX ADMIN — Medication 1000 MILLIGRAM(S): at 14:00

## 2024-01-03 RX ADMIN — PANTOPRAZOLE SODIUM 10 MG/HR: 20 TABLET, DELAYED RELEASE ORAL at 13:05

## 2024-01-03 RX ADMIN — DORNASE ALFA 2.5 MILLIGRAM(S): 1 SOLUTION RESPIRATORY (INHALATION) at 09:34

## 2024-01-03 RX ADMIN — Medication 400 MILLIGRAM(S): at 13:01

## 2024-01-03 RX ADMIN — Medication 2: at 06:34

## 2024-01-03 RX ADMIN — Medication 5 MILLIGRAM(S): at 06:32

## 2024-01-03 RX ADMIN — INSULIN GLARGINE 14 UNIT(S): 100 INJECTION, SOLUTION SUBCUTANEOUS at 23:25

## 2024-01-03 RX ADMIN — Medication 3 MILLILITER(S): at 04:37

## 2024-01-03 RX ADMIN — MOMETASONE FUROATE 2 PUFF(S): 220 INHALANT RESPIRATORY (INHALATION) at 22:59

## 2024-01-03 RX ADMIN — Medication 4: at 00:34

## 2024-01-03 RX ADMIN — LEVETIRACETAM 250 MILLIGRAM(S): 250 TABLET, FILM COATED ORAL at 06:32

## 2024-01-03 RX ADMIN — Medication 5 MILLIGRAM(S): at 00:34

## 2024-01-03 RX ADMIN — SODIUM CHLORIDE 50 MILLILITER(S): 9 INJECTION INTRAMUSCULAR; INTRAVENOUS; SUBCUTANEOUS at 13:04

## 2024-01-03 RX ADMIN — Medication 5 MILLIGRAM(S): at 17:14

## 2024-01-03 RX ADMIN — DORNASE ALFA 2.5 MILLIGRAM(S): 1 SOLUTION RESPIRATORY (INHALATION) at 22:59

## 2024-01-03 RX ADMIN — HEPARIN SODIUM 5000 UNIT(S): 5000 INJECTION INTRAVENOUS; SUBCUTANEOUS at 23:27

## 2024-01-03 RX ADMIN — SODIUM CHLORIDE 100 MILLILITER(S): 9 INJECTION, SOLUTION INTRAVENOUS at 07:43

## 2024-01-03 RX ADMIN — Medication 2: at 17:13

## 2024-01-03 RX ADMIN — LEVETIRACETAM 250 MILLIGRAM(S): 250 TABLET, FILM COATED ORAL at 17:14

## 2024-01-03 RX ADMIN — Medication 400 MILLIGRAM(S): at 06:33

## 2024-01-03 RX ADMIN — Medication 1000 MILLIGRAM(S): at 01:04

## 2024-01-03 RX ADMIN — Medication 10 MILLIGRAM(S): at 09:44

## 2024-01-03 RX ADMIN — HEPARIN SODIUM 5000 UNIT(S): 5000 INJECTION INTRAVENOUS; SUBCUTANEOUS at 13:02

## 2024-01-03 NOTE — PROGRESS NOTE ADULT - SUBJECTIVE AND OBJECTIVE BOX
Interval Events:   Drop from hgb 9 --> 7 yesterday between 1 am and 9 pm likely reflective of blood loss during the morning and during endoscopy, received 2u pRBC and responded appropriately to 9 as of this morning  Denies abd pain  Per RN ~300-600 cc of dark tarry stool overnight, none this am  Continues on panto gtt    Allergies:  fluoroquinolone antibiotics (Other)  Cipro (Unknown)  Tegretol (Unknown)  carbamazepine (Other)        Hospital Medications:  acetaminophen   IVPB .. 1000 milliGRAM(s) IV Intermittent every 6 hours  albuterol/ipratropium for Nebulization 3 milliLiter(s) Nebulizer every 12 hours  dextrose 50% Injectable 25 Gram(s) IV Push once  dextrose 50% Injectable 12.5 Gram(s) IV Push once  dornase cierra Solution 2.5 milliGRAM(s) Inhalation every 12 hours  hydrALAZINE Injectable 10 milliGRAM(s) IV Push once  insulin glargine Injectable (LANTUS) 14 Unit(s) SubCutaneous at bedtime  insulin lispro (ADMELOG) corrective regimen sliding scale   SubCutaneous every 6 hours  lactated ringers. 1000 milliLiter(s) IV Continuous <Continuous>  levETIRAcetam   Injectable 250 milliGRAM(s) IV Push every 12 hours  metoclopramide Injectable 5 milliGRAM(s) IV Push every 6 hours  metoprolol tartrate Injectable 5 milliGRAM(s) IV Push every 6 hours  pantoprazole Infusion 8 mG/Hr IV Continuous <Continuous>      PMHX/PSHX:  Hyperlipemia    Hypertension    Coronary Artery Disease    Diabetes Mellitus Type II    Stented Coronary Artery    Neuropathy    Myocardial infarction    Stroke    Myoclonic jerking    Stage 3 chronic kidney disease    History of Cataract Extraction    Hx of CABG    H/O coronary angiogram    S/P coronary artery stent placement    S/P placement of cardiac pacemaker        Family history:  No pertinent family history in first degree relatives        ROS: As per HPI, otherwise 14-point ROS reviewed and negative.      PHYSICAL EXAM:   Vital Signs:  Vital Signs Last 24 Hrs  T(C): 36.7 (03 Jan 2024 04:00), Max: 37.1 (02 Jan 2024 16:07)  T(F): 98.1 (03 Jan 2024 04:00), Max: 98.8 (02 Jan 2024 16:07)  HR: 74 (03 Jan 2024 07:00) (69 - 93)  BP: 169/61 (03 Jan 2024 07:00) (124/98 - 173/69)  BP(mean): 94 (03 Jan 2024 07:00) (80 - 115)  RR: 31 (03 Jan 2024 07:00) (15 - 31)  SpO2: 98% (03 Jan 2024 07:00) (96% - 100%)    Parameters below as of 03 Jan 2024 07:00  Patient On (Oxygen Delivery Method): room air      Daily Height in cm: 175.3 (02 Jan 2024 14:28)    Daily       01-02-24 @ 07:01  -  01-03-24 @ 07:00  --------------------------------------------------------  IN: 2622 mL / OUT: 1350 mL / NET: 1272 mL        GENERAL:  No acute distress  HEENT:  Normocephalic/atraumtic,  no scleral icterus  CHEST:  No accessory muscle use  HEART:  Regular rate and rhythm  ABDOMEN:  Mildly distended but soft, nontender  EXTREMITIES:  No cyanosis, clubbing, or edema  SKIN:  No rash  NEURO:  Confused    LABS:                        9.0    14.38 )-----------( 254      ( 03 Jan 2024 03:47 )             24.8     Mean Cell Volume: 83.5 fL (01-03-24 @ 03:47)    01-03    141  |  107  |  39<H>  ----------------------------<  191<H>  4.0   |  21<L>  |  1.56<H>    Ca    7.8<L>      03 Jan 2024 03:47  Phos  2.8     01-03  Mg     2.00     01-03    TPro  5.5<L>  /  Alb  2.7<L>  /  TBili  0.9  /  DBili  x   /  AST  39  /  ALT  27  /  AlkPhos  56  01-03    LIVER FUNCTIONS - ( 03 Jan 2024 03:47 )  Alb: 2.7 g/dL / Pro: 5.5 g/dL / ALK PHOS: 56 U/L / ALT: 27 U/L / AST: 39 U/L / GGT: x           PT/INR - ( 03 Jan 2024 03:47 )   PT: 12.9 sec;   INR: 1.16 ratio         PTT - ( 03 Jan 2024 03:47 )  PTT:33.1 sec  Urinalysis Basic - ( 03 Jan 2024 03:47 )    Color: x / Appearance: x / SG: x / pH: x  Gluc: 191 mg/dL / Ketone: x  / Bili: x / Urobili: x   Blood: x / Protein: x / Nitrite: x   Leuk Esterase: x / RBC: x / WBC x   Sq Epi: x / Non Sq Epi: x / Bacteria: x                              9.0    14.38 )-----------( 254      ( 03 Jan 2024 03:47 )             24.8                         6.8    13.91 )-----------( 275      ( 02 Jan 2024 22:13 )             19.5                         7.0    16.29 )-----------( 288      ( 02 Jan 2024 21:12 )             19.8                         9.0    13.93 )-----------( 298      ( 02 Jan 2024 01:33 )             26.3                         7.8    13.36 )-----------( 301      ( 01 Jan 2024 20:47 )             22.7       Imaging: none relevant

## 2024-01-03 NOTE — PROGRESS NOTE ADULT - ASSESSMENT
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     EKG SR RBBB (old per family     1) CAD s/p CABG  -p/w chest pain. EKG non ischemic , trop -sera   - echo with normal lv and moderate AS   - c/w metoprolol   - chest pains  Trop mildly elevated and trending down likley sec to kidney disease,         2) Covid +  - s/p remdesivir   - c/w supportive management   - t/t per primary team     3) Abd distension   -CTA AP obtained which showed  partially occlusive calcified and non-calcified plaque just distal to take-off of the SMA, with estimated at least 75% luminal narrowing. Limited evaluation of its distal branches. Patient taken emergently to OR on 12/24 s/p diagnostic laparoscopy and SMA stent placement with brachial cutdown  -Surgery/vascular on board , f/u recs  - HIDA scan + for acute asa, medical management as poor surgical candidate cont zosyn    4) UGIB  -GI on board s/p  EGD 1/2/24 which revealed bleeding vessel (likely Dieulafoy) s/p clipping and NGT trauma s/p clipping, fundus not fully visualized 2/2 clot, minute Tushar III lesion in duodenum.   -on PPI gtt

## 2024-01-03 NOTE — PROGRESS NOTE ADULT - SUBJECTIVE AND OBJECTIVE BOX
Vascular Surgery Daily Progress Note  =====================================================    SUBJECTIVE: Patient seen and examined at bedside on AM rounds. See SICU note for interval events.    Patient endorses no new complaints on rounds.     PAST MEDICAL & SURGICAL HISTORY:  Hyperlipemia  Hypertension  Coronary Artery Disease  Diabetes Mellitus Type II  Stented Coronary Artery  total 5 stents, last stent 5/2019  Neuropathy  Myocardial infarction  Stroke  mild left facial numbness   no other residuals verbalized  Myoclonic jerking  Stage 3 chronic kidney disease  History of Cataract Extraction  Hx of CABG  H/O coronary angiogram  S/P coronary artery stent placement  1/6/09  S/P placement of cardiac pacemaker      ALLERGIES:  fluoroquinolone antibiotics (Other)  Cipro (Unknown)  Tegretol (Unknown)  carbamazepine (Other)    --------------------------------------------------------------------------------------    MEDICATIONS:    Neurologic Medications  acetaminophen   IVPB .. 1000 milliGRAM(s) IV Intermittent every 6 hours  levETIRAcetam   Injectable 250 milliGRAM(s) IV Push every 12 hours  metoclopramide Injectable 5 milliGRAM(s) IV Push every 6 hours    Respiratory Medications  albuterol/ipratropium for Nebulization 3 milliLiter(s) Nebulizer every 12 hours  dornase cierra Solution 2.5 milliGRAM(s) Inhalation every 12 hours    Cardiovascular Medications  metoprolol tartrate Injectable 5 milliGRAM(s) IV Push every 6 hours    Gastrointestinal Medications  lactated ringers. 1000 milliLiter(s) IV Continuous <Continuous>  pantoprazole Infusion 8 mG/Hr IV Continuous <Continuous>    Genitourinary Medications    Hematologic/Oncologic Medications    Antimicrobial/Immunologic Medications    Endocrine/Metabolic Medications  dextrose 50% Injectable 25 Gram(s) IV Push once  dextrose 50% Injectable 12.5 Gram(s) IV Push once  insulin glargine Injectable (LANTUS) 14 Unit(s) SubCutaneous at bedtime  insulin lispro (ADMELOG) corrective regimen sliding scale   SubCutaneous every 6 hours    Topical/Other Medications    --------------------------------------------------------------------------------------    VITAL SIGNS:  T(C): 36.7 (01-03-24 @ 04:00), Max: 37.1 (01-02-24 @ 08:00)  HR: 74 (01-03-24 @ 07:00) (69 - 93)  BP: 169/61 (01-03-24 @ 07:00) (124/98 - 185/75)  RR: 31 (01-03-24 @ 07:00) (15 - 31)  SpO2: 98% (01-03-24 @ 07:00) (96% - 100%)  --------------------------------------------------------------------------------------    EXAM    General: NAD, resting in bed comfortably.  Cardiac: regular rate  Respiratory: Nonlabored respirations, normal cw expansion.  Abdomen: soft, softly distended, nontender.   Extremities: moving extremities  --------------------------------------------------------------------------------------    LABS                          9.0    14.38 )-----------( 254      ( 03 Jan 2024 03:47 )             24.8     01-03    141  |  107  |  39<H>  ----------------------------<  191<H>  4.0   |  21<L>  |  1.56<H>    Ca    7.8<L>      03 Jan 2024 03:47  Phos  2.8     01-03  Mg     2.00     01-03    TPro  5.5<L>  /  Alb  2.7<L>  /  TBili  0.9  /  DBili  x   /  AST  39  /  ALT  27  /  AlkPhos  56  01-03    --------------------------------------------------------------------------------------      I&Os:    01-02-24 @ 07:01  -  01-03-24 @ 07:00  --------------------------------------------------------  IN: 2622 mL / OUT: 1350 mL / NET: 1272 mL        --------------------------------------------------------------------------------------

## 2024-01-03 NOTE — PROGRESS NOTE ADULT - SUBJECTIVE AND OBJECTIVE BOX
Interval Events:  -S/p EGD with clipping of bleeding duodenal ulcers  - 2u pRBCs overnight fo Hb 6.8    NEURO  RASS (if intubated): 		CAM ICU (if concern for delirium):  Exam: somnolent  Meds: acetaminophen   IVPB .. 1000 milliGRAM(s) IV Intermittent every 6 hours  HYDROmorphone  Injectable 0.2 milliGRAM(s) IV Push once  levETIRAcetam   Injectable 250 milliGRAM(s) IV Push every 12 hours  metoclopramide Injectable 5 milliGRAM(s) IV Push every 6 hours      RESPIRATORY  RR: 20 (01-03-24 @ 00:00) (13 - 24)  SpO2: 100% (01-03-24 @ 00:00) (98% - 100%)  Wt(kg): --  Exam: nonlabored respirations  Mechanical Ventilation:   ABG - ( 01 Jan 2024 14:00 )  pH: x     /  pCO2: x     /  pO2: x     / HCO3: x     / Base Excess: x     /  SaO2: x       Lactate: 9.0              Meds:     CARDIOVASCULAR  HR: 72 (01-03-24 @ 00:00) (69 - 91)  BP: 173/69 (01-03-24 @ 00:00) (124/98 - 194/73)  BP(mean): 99 (01-03-24 @ 00:00) (80 - 115)  ABP: --  ABP(mean): --  Wt(kg): --  CVP(cm H2O): --  VBG - ( 01 Jan 2024 20:47 )  pH: x     /  pCO2: x     /  pO2: x     / HCO3: x     / Base Excess: x     /  SaO2: x      Lactate: 2.6        Meds: metoprolol tartrate Injectable 5 milliGRAM(s) IV Push every 6 hours      GI/NUTRITION  Exam: soft, mild tenderness, mild distention  Diet: CLD  Meds: pantoprazole Infusion 8 mG/Hr IV Continuous <Continuous>      GENITOURINARY  I&O's Detail    01-01 @ 07:01  -  01-02 @ 07:00  --------------------------------------------------------  IN:    IV PiggyBack: 200 mL    Lactated Ringers: 1700 mL    Oral Fluid: 300 mL    Pantoprazole: 110 mL    PRBCs (Packed Red Blood Cells): 270 mL  Total IN: 2580 mL    OUT:    Nasogastric/Oral tube (mL): 800 mL    Stool (mL): 1 mL    Voided (mL): 700 mL  Total OUT: 1501 mL    Total NET: 1079 mL      01-02 @ 07:01  -  01-03 @ 00:47  --------------------------------------------------------  IN:    IV PiggyBack: 100 mL    Lactated Ringers: 1500 mL    Pantoprazole: 130 mL  Total IN: 1730 mL    OUT:    Voided (mL): 550 mL  Total OUT: 550 mL    Total NET: 1180 mL        Weight (kg): 79.3 (01-02 @ 14:28)  01-02    134<L>  |  103  |  46<H>  ----------------------------<  204<H>  4.0   |  20<L>  |  1.77<H>    Ca    7.6<L>      02 Jan 2024 01:33  Phos  2.2     01-02  Mg     2.20     01-02    TPro  5.5<L>  /  Alb  2.6<L>  /  TBili  0.9  /  DBili  0.4<H>  /  AST  36  /  ALT  23  /  AlkPhos  60  01-02    Meds: lactated ringers. 1000 milliLiter(s) IV Continuous <Continuous>      HEMATOLOGIC  Meds:                         6.8    13.91 )-----------( 275      ( 02 Jan 2024 22:13 )             19.5         INFECTIOUS DISEASES  T(C): 36.5 (01-03-24 @ 00:00), Max: 37.1 (01-02-24 @ 08:00)  Wt(kg): --  WBC Count: 13.91 K/uL (01-02 @ 22:13)  WBC Count: 16.29 K/uL (01-02 @ 21:12)  WBC Count: 13.93 K/uL (01-02 @ 01:33)    Recent Cultures:    Meds:     ENDOCRINE  Capillary Blood Glucose    Meds: dextrose 50% Injectable 25 Gram(s) IV Push once  dextrose 50% Injectable 12.5 Gram(s) IV Push once  insulin glargine Injectable (LANTUS) 14 Unit(s) SubCutaneous at bedtime  insulin lispro (ADMELOG) corrective regimen sliding scale   SubCutaneous every 6 hours    OTHER MEDICATIONS:      IMAGING: Interval Events:  -S/p EGD with clipping of bleeding duodenal ulcers  - 2u pRBCs overnight fo Hb 6.8--> 9  - added pulmozyne and duonebs for cough      NEURO  RASS (if intubated): 		CAM ICU (if concern for delirium):  Exam: somnolent  Meds: acetaminophen   IVPB .. 1000 milliGRAM(s) IV Intermittent every 6 hours  HYDROmorphone  Injectable 0.2 milliGRAM(s) IV Push once  levETIRAcetam   Injectable 250 milliGRAM(s) IV Push every 12 hours  metoclopramide Injectable 5 milliGRAM(s) IV Push every 6 hours      RESPIRATORY  RR: 20 (01-03-24 @ 00:00) (13 - 24)  SpO2: 100% (01-03-24 @ 00:00) (98% - 100%)  Wt(kg): --  Exam: nonlabored respirations  Mechanical Ventilation:   ABG - ( 01 Jan 2024 14:00 )  pH: x     /  pCO2: x     /  pO2: x     / HCO3: x     / Base Excess: x     /  SaO2: x       Lactate: 9.0              Meds:     CARDIOVASCULAR  HR: 72 (01-03-24 @ 00:00) (69 - 91)  BP: 173/69 (01-03-24 @ 00:00) (124/98 - 194/73)  BP(mean): 99 (01-03-24 @ 00:00) (80 - 115)  ABP: --  ABP(mean): --  Wt(kg): --  CVP(cm H2O): --  VBG - ( 01 Jan 2024 20:47 )  pH: x     /  pCO2: x     /  pO2: x     / HCO3: x     / Base Excess: x     /  SaO2: x      Lactate: 2.6        Meds: metoprolol tartrate Injectable 5 milliGRAM(s) IV Push every 6 hours      GI/NUTRITION  Exam: soft, mild tenderness, mild distention  Diet: CLD  Meds: pantoprazole Infusion 8 mG/Hr IV Continuous <Continuous>      GENITOURINARY  I&O's Detail    01-01 @ 07:01  -  01-02 @ 07:00  --------------------------------------------------------  IN:    IV PiggyBack: 200 mL    Lactated Ringers: 1700 mL    Oral Fluid: 300 mL    Pantoprazole: 110 mL    PRBCs (Packed Red Blood Cells): 270 mL  Total IN: 2580 mL    OUT:    Nasogastric/Oral tube (mL): 800 mL    Stool (mL): 1 mL    Voided (mL): 700 mL  Total OUT: 1501 mL    Total NET: 1079 mL      01-02 @ 07:01  -  01-03 @ 00:47  --------------------------------------------------------  IN:    IV PiggyBack: 100 mL    Lactated Ringers: 1500 mL    Pantoprazole: 130 mL  Total IN: 1730 mL    OUT:    Voided (mL): 550 mL  Total OUT: 550 mL    Total NET: 1180 mL        Weight (kg): 79.3 (01-02 @ 14:28)  01-02    134<L>  |  103  |  46<H>  ----------------------------<  204<H>  4.0   |  20<L>  |  1.77<H>    Ca    7.6<L>      02 Jan 2024 01:33  Phos  2.2     01-02  Mg     2.20     01-02    TPro  5.5<L>  /  Alb  2.6<L>  /  TBili  0.9  /  DBili  0.4<H>  /  AST  36  /  ALT  23  /  AlkPhos  60  01-02    Meds: lactated ringers. 1000 milliLiter(s) IV Continuous <Continuous>      HEMATOLOGIC  Meds:                         6.8    13.91 )-----------( 275      ( 02 Jan 2024 22:13 )             19.5         INFECTIOUS DISEASES  T(C): 36.5 (01-03-24 @ 00:00), Max: 37.1 (01-02-24 @ 08:00)  Wt(kg): --  WBC Count: 13.91 K/uL (01-02 @ 22:13)  WBC Count: 16.29 K/uL (01-02 @ 21:12)  WBC Count: 13.93 K/uL (01-02 @ 01:33)    Recent Cultures:    Meds:     ENDOCRINE  Capillary Blood Glucose    Meds: dextrose 50% Injectable 25 Gram(s) IV Push once  dextrose 50% Injectable 12.5 Gram(s) IV Push once  insulin glargine Injectable (LANTUS) 14 Unit(s) SubCutaneous at bedtime  insulin lispro (ADMELOG) corrective regimen sliding scale   SubCutaneous every 6 hours    OTHER MEDICATIONS:      IMAGING:

## 2024-01-03 NOTE — PROGRESS NOTE ADULT - ATTENDING COMMENTS
s/p EGD doing better  Hg responded to transfusion    will need to restart ASA once Hg is stable to prevent SMA stent thrombosis

## 2024-01-03 NOTE — PROGRESS NOTE ADULT - ASSESSMENT
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 CTA demonstrating mesenteric fat stranding associated with ascending/transverse colon. S/p SMA angiography with stent placement with diagnostic laparoscopy 12/24, small bowel and visible colon viable, some inflammation of omentum in RUQ. Course c/b GI bleed, s/p scope on 1/2 with GI with clips placed.     Plan:   - Diet: NPO, ADAT per GI  - Pain control PRN  - protonix infusion x72 hrs post scope, then protonix bid   - continue keppra  - HIDA +, treating cholecystitis with conservative management due to poor surgical candidate for lap asa   - please resume ASA. 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 CTA demonstrating mesenteric fat stranding associated with ascending/transverse colon. S/p SMA angiography with stent placement with diagnostic laparoscopy 12/24, small bowel and visible colon viable, some inflammation of omentum in RUQ. Course c/b GI bleed, s/p scope on 1/2 with GI with clips placed.     Plan:   - Diet: NPO, ADAT per GI  - Pain control PRN  - protonix infusion x72 hrs post scope, then protonix bid   - continue keppra  - HIDA +, treating cholecystitis with conservative management due to poor surgical candidate for lap asa   - please resume ASA. can hold Brillinta until normal BM   - Appreciate excellent care per SICU     C Team Surgery   [id number]

## 2024-01-03 NOTE — PROGRESS NOTE ADULT - ATTENDING COMMENTS
I agree with the detailed interval history, physical, and plan, which I have reviewed and edited where appropriate'; also agree with notes/assessment with my team on service.  I have personally examined the patient.  I was physically present for the key portions of the evaluation and management (E/M) service provided.  I reviewed all the pertinent data.  The patient is a critical care patient with life threatening hemodynamic and metabolic instability in SICU.  The SICU team has a constant risk benefit analyzes discussion and coordinating care with the primary team and all consultants.   The patient is in SICU with the chief complaint and diagnosis mentioned in the note.   The plan will be specified in the note.  89M with history of CAD s/p CABG s/p stents; s/p diagnostic laparoscopy and SMA stent placement with brachial cutdown in SICU for HD monitoring  complicated by UGIB with melena.  Awake  Lung clear  Heart   PLAN:   Neurologic:  - Keppra  -Tylenol  Respiratory:  -Maintain O2 saturation >92%  Cardiovascular:   -Metoprolol  Gastrointestinal/Nutrition:  -Diet: clears  Genitourinary/Renal:  - Monitor-Uo  Hematologic:  -FU CBC  -SQH   Infectious Disease:  -Monitor fever curve   Endocrine:  -T2DM   -Jeremiah HAYS 14U  -Monitor glucose     Disposition: SICU

## 2024-01-03 NOTE — PROGRESS NOTE ADULT - SUBJECTIVE AND OBJECTIVE BOX
Aashish Boyer MD  Interventional Cardiology / Advance Heart Failure and Cardiac Transplant Specialist  [place] Office : [address] [place] [address]  Tel:   [place] Office : [address] [facility] [address]  Tel: [phone]      Subjective/Overnight events: Pt is lying in bed not in distress  	  MEDICATIONS:  heparin   Injectable 5000 Unit(s) SubCutaneous every 8 hours  metoprolol tartrate Injectable 5 milliGRAM(s) IV Push every 6 hours      albuterol/ipratropium for Nebulization 3 milliLiter(s) Nebulizer every 12 hours  dornase cierra Solution 2.5 milliGRAM(s) Inhalation every 12 hours  mometasone 220 MICROgram(s) Inhaler 2 Puff(s) Inhalation daily    levETIRAcetam   Injectable 250 milliGRAM(s) IV Push every 12 hours    pantoprazole Infusion 8 mG/Hr IV Continuous <Continuous>    dextrose 50% Injectable 25 Gram(s) IV Push once  dextrose 50% Injectable 12.5 Gram(s) IV Push once  insulin glargine Injectable (LANTUS) 14 Unit(s) SubCutaneous at bedtime  insulin lispro (ADMELOG) corrective regimen sliding scale   SubCutaneous three times a day before meals    sodium chloride 0.9%. 1000 milliLiter(s) IV Continuous <Continuous>      PAST MEDICAL/SURGICAL HISTORY  PAST MEDICAL & SURGICAL HISTORY:  Hyperlipemia      Hypertension      Coronary Artery Disease      Diabetes Mellitus Type II      Stented Coronary Artery  total 5 stents, last stent 5/2019      Neuropathy      Myocardial infarction      Stroke  mild left facial numbness   no other residuals verbalized      Myoclonic jerking      Stage 3 chronic kidney disease      History of Cataract Extraction      Hx of CABG      H/O coronary angiogram      S/P coronary artery stent placement  1/6/09      S/P placement of cardiac pacemaker          SOCIAL HISTORY: Substance Use (street drugs): ( x ) never used  (  ) other:    FAMILY HISTORY:  No pertinent family history in first degree relatives            PHYSICAL EXAM:  T(C): 36.7 (01-03-24 @ 12:00), Max: 37.1 (01-02-24 @ 16:07)  HR: 83 (01-03-24 @ 14:00) (70 - 93)  BP: 138/54 (01-03-24 @ 14:00) (124/98 - 195/76)  RR: 17 (01-03-24 @ 14:00) (15 - 31)  SpO2: 98% (01-03-24 @ 14:00) (96% - 100%)  Wt(kg): --  I&O's Summary    02 Jan 2024 07:01  -  03 Jan 2024 07:00  --------------------------------------------------------  IN: 2622 mL / OUT: 1350 mL / NET: 1272 mL    03 Jan 2024 07:01  -  03 Jan 2024 15:15  --------------------------------------------------------  IN: 680 mL / OUT: 150 mL / NET: 530 mL          GENERAL: NAD  EYES:  conjunctiva and sclera clear  ENMT: No tonsillar erythema, exudates, or enlargement  Cardiovascular: Normal S1 S2, No JVD, No murmurs, No edema  Respiratory: Lungs clear to auscultation	  Gastrointestinal:  Soft  Extremities: No edema                                     9.0    14.38 )-----------( 254      ( 03 Jan 2024 03:47 )             24.8     01-03    141  |  107  |  39<H>  ----------------------------<  191<H>  4.0   |  21<L>  |  1.56<H>    Ca    7.8<L>      03 Jan 2024 03:47  Phos  2.8     01-03  Mg     2.00     01-03    TPro  5.5<L>  /  Alb  2.7<L>  /  TBili  0.9  /  DBili  x   /  AST  39  /  ALT  27  /  AlkPhos  56  01-03    proBNP:   Lipid Profile:   HgA1c:   TSH:     Consultant(s) Notes Reviewed:  [x ] YES  [ ] NO    Care Discussed with Consultants/Other Providers [ x] YES  [ ] NO    Imaging Personally Reviewed independently:  [x] YES  [ ] NO    All labs, radiologic studies, vitals, orders and medications list reviewed. Patient is seen and examined at bedside. Case discussed with medical team.         Aashish Boyer MD  Interventional Cardiology / Advance Heart Failure and Cardiac Transplant Specialist  [place] Office : [address] [place] [address]  Tel:   [place] Office : [address] [facility] [address]  Tel: [phone]      Subjective/Overnight events: Pt is lying in bed not in distress  	  MEDICATIONS:  heparin   Injectable 5000 Unit(s) SubCutaneous every 8 hours  metoprolol tartrate Injectable 5 milliGRAM(s) IV Push every 6 hours      albuterol/ipratropium for Nebulization 3 milliLiter(s) Nebulizer every 12 hours  dornase cierra Solution 2.5 milliGRAM(s) Inhalation every 12 hours  mometasone 220 MICROgram(s) Inhaler 2 Puff(s) Inhalation daily    levETIRAcetam   Injectable 250 milliGRAM(s) IV Push every 12 hours    pantoprazole Infusion 8 mG/Hr IV Continuous <Continuous>    dextrose 50% Injectable 25 Gram(s) IV Push once  dextrose 50% Injectable 12.5 Gram(s) IV Push once  insulin glargine Injectable (LANTUS) 14 Unit(s) SubCutaneous at bedtime  insulin lispro (ADMELOG) corrective regimen sliding scale   SubCutaneous three times a day before meals    sodium chloride 0.9%. 1000 milliLiter(s) IV Continuous <Continuous>      PAST MEDICAL/SURGICAL HISTORY  PAST MEDICAL & SURGICAL HISTORY:  Hyperlipemia      Hypertension      Coronary Artery Disease      Diabetes Mellitus Type II      Stented Coronary Artery  total 5 stents, last stent 5/2019      Neuropathy      Myocardial infarction      Stroke  mild left facial numbness   no other residuals verbalized      Myoclonic jerking      Stage 3 chronic kidney disease      History of Cataract Extraction      Hx of CABG      H/O coronary angiogram      S/P coronary artery stent placement  1/6/09      S/P placement of cardiac pacemaker          SOCIAL HISTORY: Substance Use (street drugs): ( x ) never used  (  ) other:    FAMILY HISTORY:  No pertinent family history in first degree relatives            PHYSICAL EXAM:  T(C): 36.7 (01-03-24 @ 12:00), Max: 37.1 (01-02-24 @ 16:07)  HR: 83 (01-03-24 @ 14:00) (70 - 93)  BP: 138/54 (01-03-24 @ 14:00) (124/98 - 195/76)  RR: 17 (01-03-24 @ 14:00) (15 - 31)  SpO2: 98% (01-03-24 @ 14:00) (96% - 100%)  Wt(kg): --  I&O's Summary    02 Jan 2024 07:01  -  03 Jan 2024 07:00  --------------------------------------------------------  IN: 2622 mL / OUT: 1350 mL / NET: 1272 mL    03 Jan 2024 07:01  -  03 Jan 2024 15:15  --------------------------------------------------------  IN: 680 mL / OUT: 150 mL / NET: 530 mL          GENERAL: NAD  EYES:  conjunctiva and sclera clear  ENMT: No tonsillar erythema, exudates, or enlargement  Cardiovascular: Normal S1 S2, No JVD, No murmurs, No edema  Respiratory: Lungs clear to auscultation	  Gastrointestinal:  Soft  Extremities: No edema                                     9.0    14.38 )-----------( 254      ( 03 Jan 2024 03:47 )             24.8     01-03    141  |  107  |  39<H>  ----------------------------<  191<H>  4.0   |  21<L>  |  1.56<H>    Ca    7.8<L>      03 Jan 2024 03:47  Phos  2.8     01-03  Mg     2.00     01-03    TPro  5.5<L>  /  Alb  2.7<L>  /  TBili  0.9  /  DBili  x   /  AST  39  /  ALT  27  /  AlkPhos  56  01-03    proBNP:   Lipid Profile:   HgA1c:   TSH:     Consultant(s) Notes Reviewed:  [x ] YES  [ ] NO    Care Discussed with Consultants/Other Providers [ x] YES  [ ] NO    Imaging Personally Reviewed independently:  [x] YES  [ ] NO    All labs, radiologic studies, vitals, orders and medications list reviewed. Patient is seen and examined at bedside. Case discussed with medical team.

## 2024-01-03 NOTE — PROGRESS NOTE ADULT - SUBJECTIVE AND OBJECTIVE BOX
Noah 5 [id number]   Research Visit 3    Is there a New Consent Version Available? - No    Discontinuation Criteria:  Does the patient meet any of the following discontinuation criteria? - No    If yes, Which Criteria?   1. Inclusion in the study in violation of the inclusion and/or exclusion criteria (evaluated at the  time of screening).  2. Pregnancy in female participants.  3. Intention of becoming pregnant for female participants.  4. Simultaneous use of an approved or non-approved investigational medicinal product in another interventional clinical study.  5. Acute, systemic hypersensitivity reaction to the trial product requiring systemic treatment.  6. Clinically relevant thromboembolic event which requires treatment with anticoagulants or  antiplatelet therapy.  7. Any planned minor or major surgery.  8. Incapacity or unwillingness to follow the study procedures.  9. Any medical condition that might jeopardise the participant’s safety.    Central Labs:  All protocol required labs were drawn at 0926 and taken to [facility] for processing and shipping.    Review Diary Data:  All diary data is up-to-date.    Technical Complaints:   No complaints reported.     Upcoming Surgeries:   No planned surgeries reported.    Adverse Events:  No AEs occurred since last visit.    Bleeding Episodes:  No bleeding episodes since last visit.    Hemostatic Medications:  Hemlibra 1.5mg/kg weekly, 10/xx/2022 - 12/11/2023  Dosed 11/27/2023, 12/3/2023, & 12/11/2023  Eloctate 3500IU PRN for breakthrough bleeds (active)    Concomitant Medications:  Miquel report he has not taken any medications since last visit.    Medical History:  MH #1: Intracranial hemorrhage - 2009 - 12/xx/2009  MH #2: L knee hemarthrosis - xx/xx/2011 - xx/xx/2012  MH #3: Acute R subdural hematoma (spontaneous) - 5/17/2022 - 8/15/2022  MH #4: Extracranial hemorrhage (traumatic) - 10/1/2022 - 10/11/2022  MH #5: Broviac placement - 6/xx/2009  MH #6: Left  Overnight events:   - EGD w/ bleeding ulcer and clot, ulcer treated    SUBJECTIVE:  Patient was seen and examined on AM rounds. Unable to complete subjective exam due to patient mental status.    OBJECTIVE:  Vital Signs Last 24 Hrs  T(C): 36.7 (03 Jan 2024 12:00), Max: 37.1 (02 Jan 2024 16:07)  T(F): 98 (03 Jan 2024 12:00), Max: 98.8 (02 Jan 2024 16:07)  HR: 76 (03 Jan 2024 15:00) (70 - 93)  BP: 146/52 (03 Jan 2024 15:00) (124/98 - 195/76)  BP(mean): 81 (03 Jan 2024 15:00) (70 - 107)  RR: 19 (03 Jan 2024 15:00) (15 - 31)  SpO2: 98% (03 Jan 2024 15:00) (96% - 100%)    Parameters below as of 03 Jan 2024 16:00  Patient On (Oxygen Delivery Method): room air      01-02-24 @ 07:01 - 01-03-24 @ 07:00  --------------------------------------------------------  IN: 2622 mL / OUT: 1350 mL / NET: 1272 mL    01-03-24 @ 07:01  -  01-03-24 @ 16:02  --------------------------------------------------------  IN: 680 mL / OUT: 150 mL / NET: 530 mL      Physical Examination:  GEN: NAD, resting quietly  PULM: symmetric chest rise bilaterally, no increased WOB, intermittently coughing (dry cough)  ABD: soft, nontender, nondistended, no rebound or guarding  EXTR: no LE erythema, moving all extremities      LABS:                        9.0    14.38 )-----------( 254      ( 03 Jan 2024 03:47 )             24.8       01-03    141  |  107  |  39<H>  ----------------------------<  191<H>  4.0   |  21<L>  |  1.56<H>    Ca    7.8<L>      03 Jan 2024 03:47  Phos  2.8     01-03  Mg     2.00     01-03    TPro  5.5<L>  /  Alb  2.7<L>  /  TBili  0.9  /  DBili  x   /  AST  39  /  ALT  27  /  AlkPhos  56  01-03       subclavian mediport replacement - 3/21/2014  MH #7: Left subclavian mediport removed - 5/16/2014   MH #8: Left subclavian mediport placed - 5/23/2014   MH #9: Evacuation of right frontoparietal scalp hematoma - 10/11/2022   MH #10: Left subclavian mediport removed - 10/11/2022   MH #11: L knee hemarthrosis - xx/xx/2020 - xx/xx/2021    Velos:  Done    Parking Pass:  Provided mom with parking pass #[phone].    Reimbursement:   N/A    Stipend:  Annmarie - Miquel given garcia voucher.                    Overnight events:   - EGD w/ bleeding mucosa, bleeding and nonbleeding ulcers, gastritis, esophagitis; ttreated bleeding w/ endoscopic clips    SUBJECTIVE:  Patient was seen and examined on AM rounds. Unable to complete subjective exam due to patient mental status.    OBJECTIVE:  Vital Signs Last 24 Hrs  T(C): 36.7 (03 Jan 2024 12:00), Max: 37.1 (02 Jan 2024 16:07)  T(F): 98 (03 Jan 2024 12:00), Max: 98.8 (02 Jan 2024 16:07)  HR: 76 (03 Jan 2024 15:00) (70 - 93)  BP: 146/52 (03 Jan 2024 15:00) (124/98 - 195/76)  BP(mean): 81 (03 Jan 2024 15:00) (70 - 107)  RR: 19 (03 Jan 2024 15:00) (15 - 31)  SpO2: 98% (03 Jan 2024 15:00) (96% - 100%)    Parameters below as of 03 Jan 2024 16:00  Patient On (Oxygen Delivery Method): room air      01-02-24 @ 07:01  -  01-03-24 @ 07:00  --------------------------------------------------------  IN: 2622 mL / OUT: 1350 mL / NET: 1272 mL    01-03-24 @ 07:01  -  01-03-24 @ 16:02  --------------------------------------------------------  IN: 680 mL / OUT: 150 mL / NET: 530 mL      Physical Examination:  GEN: NAD, resting quietly  PULM: symmetric chest rise bilaterally, no increased WOB, intermittently coughing (dry cough)  ABD: soft, nontender, nondistended, no rebound or guarding  EXTR: no LE erythema, moving all extremities      LABS:                        9.0    14.38 )-----------( 254      ( 03 Jan 2024 03:47 )             24.8       01-03    141  |  107  |  39<H>  ----------------------------<  191<H>  4.0   |  21<L>  |  1.56<H>    Ca    7.8<L>      03 Jan 2024 03:47  Phos  2.8     01-03  Mg     2.00     01-03    TPro  5.5<L>  /  Alb  2.7<L>  /  TBili  0.9  /  DBili  x   /  AST  39  /  ALT  27  /  AlkPhos  56  01-03

## 2024-01-03 NOTE — PROGRESS NOTE ADULT - ASSESSMENT
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 CTA demonstrating mesenteric fat stranding associated with ascending/transverse colon. S/p SMA angiography with stent placement with diagnostic laparoscopy 12/24, small bowel and visible colon viable, some inflammation of omentum in RUQ. NGT out, now with return of bowl function. patient w/ bleeding gastric ulcer is treated by GI via EGD.    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 CTA demonstrating mesenteric fat stranding associated with ascending/transverse colon. S/p SMA angiography with stent placement with diagnostic laparoscopy 12/24, small bowel and visible colon viable, some inflammation of omentum in RUQ. NGT out, now with return of bowl function. patient w/ bleeding gastric ulcer is treated by GI via EGD.    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 CTA demonstrating mesenteric fat stranding associated with ascending/transverse colon. S/p SMA angiography with stent placement with diagnostic laparoscopy 12/24, small bowel and visible colon viable, some inflammation of omentum in RUQ. NGT out, now with return of bowl function. patient w/ bleeding gastric ulcer is treated by GI via EGD.    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 CTA demonstrating mesenteric fat stranding associated with ascending/transverse colon. S/p SMA angiography with stent placement with diagnostic laparoscopy 12/24, small bowel and visible colon viable, some inflammation of omentum in RUQ. NGT out, now with return of bowl function. patient w/ bleeding gastric ulcer is treated by GI via EGD.    PLAN:  - Diet per GI  - appreciate GI recs (hold NG tube, ppi drip)  - Appreciate excellent care per SICU and primary  - general surgery to sign off, please re-page as needed    B Team Surgery   [id number]

## 2024-01-03 NOTE — CHART NOTE - NSCHARTNOTEFT_GEN_A_CORE
Patient being seen for critical care nutrition follow up. Spoke with pt, RN and obtained subjective information from extensive chart review.     Current Diet : Diet, Clear Liquid:   Consistent Carbohydrate {Evening Snack} (CSTCHOSN)  Angelic (01-03-24 @ 13:46)    Current Weight Trend: 85.7 kg (1/2), 78.4 kg (12/30), 74.7 kg (12/27)  Height (cm): 175.3   Admit Weight (kg): 79.3   BMI (kg/m2): 25.8   IBW (kg): 70.7    Nutrition Interval Events: Pt found to have UGIB on 1/1/24. S/p EGD with GI on 1/2/24 with clipping of bleeding gastric ulcer. SLP evaluated pt earlier today with clearance for thin liquids only. Diet advanced to Clear Liquid. Pt consuming only very small amounts. He has had inadequate nutrition intake > 7 days this admission and was captured with wt below admission wt on 12/27 with wt loss of 1.2% He now has 1+ generalized and 2+ L/R edema of his hands which is likely masking a lower true wt. Pt presents at moderate risk for malnutrition based on prolonged inadequate nutrition intake this admission with now fluid accumulation as noted. Recommend adding LPS protein modular (15 gms protein, 100 kcals) x 3/day with Clear Liquid diet and advancing diet as medically feasible when appropriate. FS over the past 24 hrs 172 - 269 mg/dl with Lantus and Admelog insulins ordered for coverage. RDN services to remain available as needed.     __________________ Pertinent Medications__________________   MEDICATIONS  (STANDING):  albuterol/ipratropium for Nebulization 3 milliLiter(s) Nebulizer every 12 hours  dextrose 50% Injectable 25 Gram(s) IV Push once  dextrose 50% Injectable 12.5 Gram(s) IV Push once  dornase cierra Solution 2.5 milliGRAM(s) Inhalation every 12 hours  heparin   Injectable 5000 Unit(s) SubCutaneous every 8 hours  insulin glargine Injectable (LANTUS) 14 Unit(s) SubCutaneous at bedtime  insulin lispro (ADMELOG) corrective regimen sliding scale   SubCutaneous three times a day before meals  levETIRAcetam   Injectable 250 milliGRAM(s) IV Push every 12 hours  metoprolol tartrate Injectable 5 milliGRAM(s) IV Push every 6 hours  mometasone 220 MICROgram(s) Inhaler 2 Puff(s) Inhalation daily  pantoprazole Infusion 8 mG/Hr (10 mL/Hr) IV Continuous <Continuous>  sodium chloride 0.9%. 1000 milliLiter(s) (50 mL/Hr) IV Continuous <Continuous>    __________________ Pertinent Labs__________________   01-03 Na141 mmol/L Glu 191 mg/dL<H> K+ 4.0 mmol/L Cr  1.56 mg/dL<H> BUN 39 mg/dL<H> 01-03 Phos 2.8 mg/dL 01-03 Alb 2.7 g/dL<L> 12-24 Chol 66 mg/dL LDL --    HDL 26 mg/dL<L> Trig 102 mg/dL        Skin: Intact    Estimated Needs (based on IBW - 70.7 kg):     1767 - 17137 kcals/day (25-30 kcal/kg)  71 - 85 gms protein/day (1.0-1.2 gms protein/kg)      Previous Nutrition Diagnoses:     Inadequate Protein-Energy Intake   Increased Nutrient Needs     Nutrition Diagnoses are: [x] ongoing        New Nutrition Diagnosis:    Moderate Malnutrition         Related to: acute illness  As evidenced by: <75% of estimated nutrition needs met > 7 days with 1+ generalized fluid accumulation      Goal(s):  1. Patient to meet > 75% estimated energy needs    Recommendations:   1. Suggest advancing diet as medically appropriate and able.   2. Recommend LPS protein modular (15 gms protein, 100 kcals) x 3/day with Clear Liquid diet.    Monitoring and Evaluation:   1. Monitor weights, labs, BMs, skin integrity and edema.  2. RD services to remain available.     Education:    [x] Not warranted at present    Amisha Driscoll, MS, RDN, CDN, CNSC on [place] TEAMS or Pager #[phone] Patient being seen for critical care nutrition follow up. Spoke with pt, RN and obtained subjective information from extensive chart review.     Current Diet : Diet, Clear Liquid:   Consistent Carbohydrate {Evening Snack} (CSTCHOSN)  Angelic (01-03-24 @ 13:46)    Current Weight Trend: 85.7 kg (1/2), 78.4 kg (12/30), 74.7 kg (12/27)  Height (cm): 175.3   Admit Weight (kg): 79.3   BMI (kg/m2): 25.8   IBW (kg): 70.7    Nutrition Interval Events: Pt found to have UGIB on 1/1/24. S/p EGD with GI on 1/2/24 with clipping of bleeding gastric ulcer. SLP evaluated pt earlier today with clearance for thin liquids only. Diet advanced to Clear Liquid. Pt consuming only very small amounts. He has had inadequate nutrition intake > 7 days this admission and was captured with wt below admission wt on 12/27 with wt loss of 1.2% He now has 1+ generalized and 2+ L/R edema of his hands which is likely masking a lower true wt. Pt presents at moderate risk for malnutrition based on prolonged inadequate nutrition intake this admission with now fluid accumulation as noted. Recommend adding LPS protein modular (15 gms protein, 100 kcals) x 3/day with Clear Liquid diet and advancing diet as medically feasible when appropriate. FS over the past 24 hrs 172 - 269 mg/dl with Lantus and Admelog insulins ordered for coverage. RDN services to remain available as needed.     __________________ Pertinent Medications__________________   MEDICATIONS  (STANDING):  albuterol/ipratropium for Nebulization 3 milliLiter(s) Nebulizer every 12 hours  dextrose 50% Injectable 25 Gram(s) IV Push once  dextrose 50% Injectable 12.5 Gram(s) IV Push once  dornase cierra Solution 2.5 milliGRAM(s) Inhalation every 12 hours  heparin   Injectable 5000 Unit(s) SubCutaneous every 8 hours  insulin glargine Injectable (LANTUS) 14 Unit(s) SubCutaneous at bedtime  insulin lispro (ADMELOG) corrective regimen sliding scale   SubCutaneous three times a day before meals  levETIRAcetam   Injectable 250 milliGRAM(s) IV Push every 12 hours  metoprolol tartrate Injectable 5 milliGRAM(s) IV Push every 6 hours  mometasone 220 MICROgram(s) Inhaler 2 Puff(s) Inhalation daily  pantoprazole Infusion 8 mG/Hr (10 mL/Hr) IV Continuous <Continuous>  sodium chloride 0.9%. 1000 milliLiter(s) (50 mL/Hr) IV Continuous <Continuous>    __________________ Pertinent Labs__________________   01-03 Na141 mmol/L Glu 191 mg/dL<H> K+ 4.0 mmol/L Cr  1.56 mg/dL<H> BUN 39 mg/dL<H> 01-03 Phos 2.8 mg/dL 01-03 Alb 2.7 g/dL<L> 12-24 Chol 66 mg/dL LDL --    HDL 26 mg/dL<L> Trig 102 mg/dL        Skin: Intact    Estimated Needs (based on IBW - 70.7 kg):     1767 - 25395 kcals/day (25-30 kcal/kg)  71 - 85 gms protein/day (1.0-1.2 gms protein/kg)      Previous Nutrition Diagnoses:     Inadequate Protein-Energy Intake   Increased Nutrient Needs     Nutrition Diagnoses are: [x] ongoing        New Nutrition Diagnosis:    Moderate Malnutrition         Related to: acute illness  As evidenced by: <75% of estimated nutrition needs met > 7 days with 1+ generalized fluid accumulation      Goal(s):  1. Patient to meet > 75% estimated energy needs    Recommendations:   1. Suggest advancing diet as medically appropriate and able.   2. Recommend LPS protein modular (15 gms protein, 100 kcals) x 3/day with Clear Liquid diet.    Monitoring and Evaluation:   1. Monitor weights, labs, BMs, skin integrity and edema.  2. RD services to remain available.     Education:    [x] Not warranted at present    Amisha Driscoll, MS, RDN, CDN, CNSC on [place] TEAMS or Pager #[phone]

## 2024-01-03 NOTE — PROGRESS NOTE ADULT - ASSESSMENT
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 Hospitalization with CTA demonstrating mesenteric fat stranding associated with ascending/transverse colon. S/p SMA angiography with stent placement with diagnostic laparoscopy 12/24, small bowel and visible colon viable, some inflammation of omentum in RUQ. 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     #Hematemesis  #UGIB  #Dieulafoy lesion s/p clipping  #NGT trauma s/p clipping  - given stable VS and decreasing melena, with appropriate response to 2u pRBC, can advance diet to CLD  - continue PPI gtt   - transfuse for Hgb > 8, post transfusion CBC   - if patient HD unstable or with recurrent hematemesis, would recommend intubation if within GOC and make NPO for possible repeat EGD  - if patient HDUS or with briskly dropping hgb w/o overt bleeding would consider CTA  - defer AC to primary team/Vascular surgery given that patient likely needs to continue with DAPT for recent stent   - rest of care per primary team

## 2024-01-03 NOTE — PROGRESS NOTE ADULT - SUBJECTIVE AND OBJECTIVE BOX
NEPHROLOGY     Patient seen and examined.    MEDICATIONS  (STANDING):  acetaminophen   IVPB .. 1000 milliGRAM(s) IV Intermittent every 6 hours  albuterol/ipratropium for Nebulization 3 milliLiter(s) Nebulizer every 12 hours  dextrose 50% Injectable 25 Gram(s) IV Push once  dextrose 50% Injectable 12.5 Gram(s) IV Push once  dornase cierra Solution 2.5 milliGRAM(s) Inhalation every 12 hours  insulin glargine Injectable (LANTUS) 14 Unit(s) SubCutaneous at bedtime  insulin lispro (ADMELOG) corrective regimen sliding scale   SubCutaneous every 6 hours  lactated ringers. 1000 milliLiter(s) (100 mL/Hr) IV Continuous <Continuous>  levETIRAcetam   Injectable 250 milliGRAM(s) IV Push every 12 hours  metoclopramide Injectable 5 milliGRAM(s) IV Push every 6 hours  metoprolol tartrate Injectable 5 milliGRAM(s) IV Push every 6 hours  pantoprazole Infusion 8 mG/Hr (10 mL/Hr) IV Continuous <Continuous>    VITALS:  T(C): , Max: 37.1 (01-02-24 @ 16:07)  T(F): , Max: 98.8 (01-02-24 @ 16:07)  HR: 70 (01-03-24 @ 08:00)  BP: 169/61 (01-03-24 @ 07:00)  BP(mean): 94 (01-03-24 @ 07:00)  RR: 16 (01-03-24 @ 08:00)  SpO2: 99% (01-03-24 @ 08:00)    I and O's:    01-02 @ 07:01  -  01-03 @ 07:00  --------------------------------------------------------  IN: 2622 mL / OUT: 1350 mL / NET: 1272 mL    01-03 @ 07:01  -  01-03 @ 09:53  --------------------------------------------------------  IN: 330 mL / OUT: 150 mL / NET: 180 mL    Height (cm): 175.3 (01-02 @ 14:28)  Weight (kg): 79.3 (01-02 @ 14:28)  BMI (kg/m2): 25.8 (01-02 @ 14:28)  BSA (m2): 1.95 (01-02 @ 14:28)    PHYSICAL EXAM:  Constitutional: NAD.  Neck:  No JVD, supple   Respiratory: CTA B/L  Cardiovascular: S1 and S2, RRR  Gastrointestinal: soft,  distended   Extremities: No peripheral edema, + peripheral pulses  Neurological:   Psychiatric: Normal mood, normal affect  : no Moss  Skin: No rashes, C/D/I      LABS:                        9.0    14.38 )-----------( 254      ( 03 Jan 2024 03:47 )             24.8     01-03    141  |  107  |  39<H>  ----------------------------<  191<H>  4.0   |  21<L>  |  1.56<H>    Ca    7.8<L>      03 Jan 2024 03:47  Phos  2.8     01-03  Mg     2.00     01-03    TPro  5.5<L>  /  Alb  2.7<L>  /  TBili  0.9  /  DBili  x   /  AST  39  /  ALT  27  /  AlkPhos  56  01-03    RADIOLOGY & ADDITIONAL STUDIES:    r< from: Xray Chest 1 View- PORTABLE-Urgent (Xray Chest 1 View- PORTABLE-Urgent .) (01.02.24 @ 12:36) >      IMPRESSION: Small right effusion.    --- End of Report ---            AMELIA ANGLIN MD; Attending Radiologist  This document has been electronically signed. Jan 2 2024  1:33PM    < end of copied text >   NEPHROLOGY     Patient seen and examined with family at bedside, on protonix gtt, no sob, in no acute distress.     MEDICATIONS  (STANDING):  acetaminophen   IVPB .. 1000 milliGRAM(s) IV Intermittent every 6 hours  albuterol/ipratropium for Nebulization 3 milliLiter(s) Nebulizer every 12 hours  dextrose 50% Injectable 25 Gram(s) IV Push once  dextrose 50% Injectable 12.5 Gram(s) IV Push once  dornase cierra Solution 2.5 milliGRAM(s) Inhalation every 12 hours  insulin glargine Injectable (LANTUS) 14 Unit(s) SubCutaneous at bedtime  insulin lispro (ADMELOG) corrective regimen sliding scale   SubCutaneous every 6 hours  lactated ringers. 1000 milliLiter(s) (100 mL/Hr) IV Continuous <Continuous>  levETIRAcetam   Injectable 250 milliGRAM(s) IV Push every 12 hours  metoclopramide Injectable 5 milliGRAM(s) IV Push every 6 hours  metoprolol tartrate Injectable 5 milliGRAM(s) IV Push every 6 hours  pantoprazole Infusion 8 mG/Hr (10 mL/Hr) IV Continuous <Continuous>    VITALS:  T(C): , Max: 37.1 (01-02-24 @ 16:07)  T(F): , Max: 98.8 (01-02-24 @ 16:07)  HR: 70 (01-03-24 @ 08:00)  BP: 169/61 (01-03-24 @ 07:00)  BP(mean): 94 (01-03-24 @ 07:00)  RR: 16 (01-03-24 @ 08:00)  SpO2: 99% (01-03-24 @ 08:00)    I and O's:    01-02 @ 07:01  -  01-03 @ 07:00  --------------------------------------------------------  IN: 2622 mL / OUT: 1350 mL / NET: 1272 mL    01-03 @ 07:01  -  01-03 @ 09:53  --------------------------------------------------------  IN: 330 mL / OUT: 150 mL / NET: 180 mL    Height (cm): 175.3 (01-02 @ 14:28)  Weight (kg): 79.3 (01-02 @ 14:28)  BMI (kg/m2): 25.8 (01-02 @ 14:28)  BSA (m2): 1.95 (01-02 @ 14:28)    PHYSICAL EXAM:  Constitutional: NAD.  Neck:  No JVD, supple   Respiratory: CTA B/L  Cardiovascular: S1 and S2, RRR  Gastrointestinal: soft,  distended   Extremities: tr peripheral edema, + peripheral pulses  Neurological: aox2  Psychiatric: Normal mood, normal affect  : no Moss  Skin: No rashes, C/D/I      LABS:                        9.0    14.38 )-----------( 254      ( 03 Jan 2024 03:47 )             24.8     01-03    141  |  107  |  39<H>  ----------------------------<  191<H>  4.0   |  21<L>  |  1.56<H>    Ca    7.8<L>      03 Jan 2024 03:47  Phos  2.8     01-03  Mg     2.00     01-03    TPro  5.5<L>  /  Alb  2.7<L>  /  TBili  0.9  /  DBili  x   /  AST  39  /  ALT  27  /  AlkPhos  56  01-03    RADIOLOGY & ADDITIONAL STUDIES:    r< from: Xray Chest 1 View- PORTABLE-Urgent (Xray Chest 1 View- PORTABLE-Urgent .) (01.02.24 @ 12:36) >      IMPRESSION: Small right effusion.    --- End of Report ---    AMELIA ANGLIN MD; Attending Radiologist  This document has been electronically signed. 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) who presents to the hospital for COVID19 infection and chest pain  MABLE   Hypophosphatemia - improving sp repletion   Hypertensive urgency      1 Renal - Creatinine stable, on LR   S/p CT with contrast   2 CV - BP elevated, on Lopressor 5 mg IV q6h, s/p hydralazine 10 mg IV x 1 this morning   3 Vasc - s/p SMA stent placement;   4 Sx - s/p HIDA + for acute asa, medical management, NGT removed, now on CLD   5 GI - s/p EGD yesterday clipping of bleeding duodenal ulcers - on protonix gtt     NEPHROLOGY     Patient seen and examined with family at bedside, on protonix gtt, no sob, in no acute distress.     MEDICATIONS  (STANDING):  acetaminophen   IVPB .. 1000 milliGRAM(s) IV Intermittent every 6 hours.  albuterol/ipratropium for Nebulization 3 milliLiter(s) Nebulizer every 12 hours  dextrose 50% Injectable 25 Gram(s) IV Push once  dextrose 50% Injectable 12.5 Gram(s) IV Push once  dornase cierra Solution 2.5 milliGRAM(s) Inhalation every 12 hours  insulin glargine Injectable (LANTUS) 14 Unit(s) SubCutaneous at bedtime  insulin lispro (ADMELOG) corrective regimen sliding scale   SubCutaneous every 6 hours  lactated ringers. 1000 milliLiter(s) (100 mL/Hr) IV Continuous <Continuous>  levETIRAcetam   Injectable 250 milliGRAM(s) IV Push every 12 hours  metoclopramide Injectable 5 milliGRAM(s) IV Push every 6 hours  metoprolol tartrate Injectable 5 milliGRAM(s) IV Push every 6 hours  pantoprazole Infusion 8 mG/Hr (10 mL/Hr) IV Continuous <Continuous>    VITALS:  T(C): , Max: 37.1 (01-02-24 @ 16:07)  T(F): , Max: 98.8 (01-02-24 @ 16:07)  HR: 70 (01-03-24 @ 08:00)  BP: 169/61 (01-03-24 @ 07:00)  BP(mean): 94 (01-03-24 @ 07:00)  RR: 16 (01-03-24 @ 08:00)  SpO2: 99% (01-03-24 @ 08:00)    I and O's:    01-02 @ 07:01  -  01-03 @ 07:00  --------------------------------------------------------  IN: 2622 mL / OUT: 1350 mL / NET: 1272 mL    01-03 @ 07:01 - 01-03 @ 09:53  --------------------------------------------------------  IN: 330 mL / OUT: 150 mL / NET: 180 mL    Height (cm): 175.3 (01-02 @ 14:28)  Weight (kg): 79.3 (01-02 @ 14:28)  BMI (kg/m2): 25.8 (01-02 @ 14:28)  BSA (m2): 1.95 (01-02 @ 14:28)  .  PHYSICAL EXAM:  Constitutional: NAD.  Neck:  No JVD, supple   Respiratory: CTA B/L  Cardiovascular: S1 and S2, RRR  Gastrointestinal: soft,  distended   Extremities: tr peripheral edema, + peripheral pulses  Neurological: aox2  Psychiatric: Normal mood, normal affect  : no Moss  Skin: No rashes, C/D/I      LABS:                        9.0    14.38 )-----------( 254      ( 03 Jan 2024 03:47 )             24.8     01-03    141  |  107  |  39<H>  ----------------------------<  191<H>  4.0   |  21<L>  |  1.56<H>    Ca    7.8<L>      03 Jan 2024 03:47.  Phos  2.8     01-03  Mg     2.00     01-03    TPro  5.5<L>  /  Alb  2.7<L>  /  TBili  0.9  /  DBili  x   /  AST  39  /  ALT  27  /  AlkPhos  56  01-03    RADIOLOGY & ADDITIONAL STUDIES:    r< from: Xray Chest 1 View- PORTABLE-Urgent (Xray Chest 1 View- PORTABLE-Urgent .) (01.02.24 @ 12:36) >      IMPRESSION: Small right effusion.    --- End of Report ---    AMELIA ANGLIN MD; Attending Radiologist  This document has been electronically signed. Jan 2 2024  1:33PM    < end of copied text >

## 2024-01-03 NOTE — SWALLOW BEDSIDE ASSESSMENT ADULT - COMMENTS
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 Surgery consulted on 12/24 for abdominal pain and distension. CTA AP obtained which showed  partially occlusive calcified and non-calcified plaque just distal to take-off of the SMA, with estimated at least 75% luminal narrowing. Limited evaluation of its distal branches. Patient taken emergently to OR on 12/24 with general surgery and vascular surgery. Patient s/p diagnostic laparoscopy and SMA stent placement with brachial cutdown. SICU consulted postoperatively for HD monitoring. Course complicated by UGIB requiring 3u pRBCs on 1/1/24. S/p EGD with GI on 1/2/24 with clipping of bleeding gastric ulcer."    Patient seen at bedside this AM in SICU for an initial assessment of the swallow function. SICU team outside of room. Received clearance for clear liquids only per team. RN reporting patient has a general cough, not specific to eating/drinking given patient has been NPO (patient placed on clear liquids today). Patient's son (who is a Neurologist) and grandson present during assessment. Patient's son with consistent report as RN that patient has a general cough. Patient is able to follow simple directives and verbalizes want/needs. Patient did NOT exhibit any baseline coughing prior to initiation of PO trials.

## 2024-01-03 NOTE — DIETITIAN NUTRITION RISK NOTIFICATION - TREATMENT: THE FOLLOWING DIET HAS BEEN RECOMMENDED
Diet, Clear Liquid:   Consistent Carbohydrate {Evening Snack} (CSTCHOSN)  Angelic (01-03-24 @ 13:46) [Active]

## 2024-01-03 NOTE — PROGRESS NOTE ADULT - ASSESSMENT
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

## 2024-01-03 NOTE — PROGRESS NOTE ADULT - ASSESSMENT
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 Surgery consulted on 12/24 for abdominal pain and distension. CTA AP obtained which showed  partially occlusive calcified and non-calcified plaque just distal to take-off of the SMA, with estimated at least 75% luminal narrowing. Limited evaluation of its distal branches. Patient taken emergently to OR on 12/24 with general surgery and vascular surgery. Patient s/p diagnostic laparoscopy and SMA stent placement with brachial cutdown. SICU consulted postoperatively for HD monitoring. Course complicated by UGIB requiring 3u pRBCs on 1/1/24. S/p EGD with GI on 1/2/24 with clipping of bleeding gastric ulcer.    PLAN:   Neurologic:  -A&Ox2 (baseline per family)   -Continue Keppra- hx of myoclonic jerks   -Pain: Tylenol  - per rectum for hx CVA, stents, hold i/s/o bleed   -Holding PO Lexapro/ Memantine    Respiratory:  -Maintain O2 saturation >92%  -COVID + (12/20)    Cardiovascular: htn  -MAP goal >65  -Metoprolol 5q6  -Rectal ASA, hold i/s/o bleed     Gastrointestinal/Nutrition:  -Diet: CLD   -RUQ US 12/26-> GB distended with cholelithiasis, equivocal   -Trial abx for cholecystitis, no plan for percutaneous cholecystostomy at this time  -PPI gtt @10 for UGIB  -S/p EGD with clipping of bleeding gastric ulcer on 1/2/24  -CTA AP if acutely worsens  -Mesenteric duplex pending    Genitourinary/Renal:  - Hx of CKD (Baseline Cr 1.2-1.3)   - Monitor strict I/Os   - LR@100    Hematologic:  -DVT ppx: hold i/s/o bleed   -Rectal ASA for hx stents, CVA x3, hold i/so bleed   -Brilinta qd- Holding since NPO, UGIB  -S/p 3u pRBCs on 1/1 for UGIB    Infectious Disease:  -S/P Zosyn (through 1/1)   -Monitor WBC   -Monitor fever curve     Endocrine:  -T2DM   -MISS, Lantus 10 qhs while NPO   -Monitor blood glucose     Disposition: SICU  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 Surgery consulted on 12/24 for abdominal pain and distension. CTA AP obtained which showed  partially occlusive calcified and non-calcified plaque just distal to take-off of the SMA, with estimated at least 75% luminal narrowing. Limited evaluation of its distal branches. Patient taken emergently to OR on 12/24 with general surgery and vascular surgery. Patient s/p diagnostic laparoscopy and SMA stent placement with brachial cutdown. SICU consulted postoperatively for HD monitoring. Course complicated by UGIB requiring 3u pRBCs on 1/1/24. S/p EGD with GI on 1/2/24 with clipping of bleeding gastric ulcer.    PLAN:   Neurologic:  -A&Ox2 (baseline per family)   -Continue Keppra- hx of myoclonic jerks   -Pain: Tylenol  - per rectum for hx CVA, stents, hold i/s/o bleed   -Holding PO Lexapro/ Memantine    Respiratory:  -Maintain O2 saturation >92%  -COVID + (12/20)    Cardiovascular: htn  -MAP goal >65  -Metoprolol 5q6  -Rectal ASA, hold i/s/o bleed     Gastrointestinal/Nutrition:  -Diet: CLD   -RUQ US 12/26-> GB distended with cholelithiasis, equivocal   -Trial abx for cholecystitis, no plan for percutaneous cholecystostomy at this time  -PPI gtt for 72 hours   -S/p EGD with clipping of bleeding gastric ulcer on 1/2/24  -Mesenteric duplex pending    Genitourinary/Renal:  - Hx of CKD (Baseline Cr 1.2-1.3)   - Monitor strict I/Os   - NS @ 50     Hematologic:  -DVT ppx: Holding   -Rectal ASA- Holding   -Brilinta qd- Holding since NPO, UGIB    Infectious Disease:  -Monitor WBC   -Monitor fever curve     Endocrine:  -T2DM   -MISS, Lantus 10 qhs while NPO   -Monitor blood glucose     Disposition: SICU  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 Surgery consulted on 12/24 for abdominal pain and distension. CTA AP obtained which showed  partially occlusive calcified and non-calcified plaque just distal to take-off of the SMA, with estimated at least 75% luminal narrowing. Limited evaluation of its distal branches. Patient taken emergently to OR on 12/24 with general surgery and vascular surgery. Patient s/p diagnostic laparoscopy and SMA stent placement with brachial cutdown. SICU consulted postoperatively for HD monitoring. Course complicated by UGIB requiring 3u pRBCs on 1/1/24. S/p EGD with GI on 1/2/24 with clipping of bleeding gastric ulcer.    PLAN:   Neurologic:  -A&Ox2 (baseline per family)   -Continue Keppra- hx of myoclonic jerks   -Pain: Tylenol  - per rectum for hx CVA, stents, hold i/s/o bleed   -Holding PO Lexapro/ Memantine    Respiratory:  -Maintain O2 saturation >92%  -COVID + (12/20)    Cardiovascular: htn  -MAP goal >65  -Metoprolol 5q6  -Rectal ASA, hold i/s/o bleed     Gastrointestinal/Nutrition:  -Diet: CLD   -RUQ US 12/26-> GB distended with cholelithiasis, equivocal   -Trial abx for cholecystitis, no plan for percutaneous cholecystostomy at this time  -PPI gtt for 72 hours   -S/p EGD with clipping of bleeding gastric ulcer on 1/2/24  -Mesenteric duplex pending    Genitourinary/Renal:  - Hx of CKD (Baseline Cr 1.2-1.3)   - Monitor strict I/Os   - NS @ 50     Hematologic:  -DVT ppx: SQH   -Rectal ASA- Holding   -Brilinta qd- Holding since NPO, UGIB    Infectious Disease:  -Monitor WBC   -Monitor fever curve     Endocrine:  -T2DM   -MISS, Lantus 10 qhs while NPO   -Monitor blood glucose     Disposition: SICU

## 2024-01-04 LAB
ALBUMIN SERPL ELPH-MCNC: 2.7 G/DL — LOW (ref 3.3–5)
ALBUMIN SERPL ELPH-MCNC: 2.7 G/DL — LOW (ref 3.3–5)
ALP SERPL-CCNC: 57 U/L — SIGNIFICANT CHANGE UP (ref 40–120)
ALP SERPL-CCNC: 57 U/L — SIGNIFICANT CHANGE UP (ref 40–120)
ALT FLD-CCNC: 25 U/L — SIGNIFICANT CHANGE UP (ref 4–41)
ALT FLD-CCNC: 25 U/L — SIGNIFICANT CHANGE UP (ref 4–41)
ANION GAP SERPL CALC-SCNC: 14 MMOL/L — SIGNIFICANT CHANGE UP (ref 7–14)
ANION GAP SERPL CALC-SCNC: 14 MMOL/L — SIGNIFICANT CHANGE UP (ref 7–14)
AST SERPL-CCNC: 36 U/L — SIGNIFICANT CHANGE UP (ref 4–40)
AST SERPL-CCNC: 36 U/L — SIGNIFICANT CHANGE UP (ref 4–40)
BILIRUB SERPL-MCNC: 0.7 MG/DL — SIGNIFICANT CHANGE UP (ref 0.2–1.2)
BILIRUB SERPL-MCNC: 0.7 MG/DL — SIGNIFICANT CHANGE UP (ref 0.2–1.2)
BLD GP AB SCN SERPL QL: NEGATIVE — SIGNIFICANT CHANGE UP
BLD GP AB SCN SERPL QL: NEGATIVE — SIGNIFICANT CHANGE UP
BUN SERPL-MCNC: 30 MG/DL — HIGH (ref 7–23)
BUN SERPL-MCNC: 30 MG/DL — HIGH (ref 7–23)
CALCIUM SERPL-MCNC: 7.8 MG/DL — LOW (ref 8.4–10.5)
CALCIUM SERPL-MCNC: 7.8 MG/DL — LOW (ref 8.4–10.5)
CHLORIDE SERPL-SCNC: 109 MMOL/L — HIGH (ref 98–107)
CHLORIDE SERPL-SCNC: 109 MMOL/L — HIGH (ref 98–107)
CO2 SERPL-SCNC: 19 MMOL/L — LOW (ref 22–31)
CO2 SERPL-SCNC: 19 MMOL/L — LOW (ref 22–31)
CREAT SERPL-MCNC: 1.51 MG/DL — HIGH (ref 0.5–1.3)
CREAT SERPL-MCNC: 1.51 MG/DL — HIGH (ref 0.5–1.3)
EGFR: 44 ML/MIN/1.73M2 — LOW
EGFR: 44 ML/MIN/1.73M2 — LOW
GLUCOSE SERPL-MCNC: 161 MG/DL — HIGH (ref 70–99)
GLUCOSE SERPL-MCNC: 161 MG/DL — HIGH (ref 70–99)
HCT VFR BLD CALC: 24.5 % — LOW (ref 39–50)
HCT VFR BLD CALC: 24.5 % — LOW (ref 39–50)
HCT VFR BLD CALC: 24.6 % — LOW (ref 39–50)
HCT VFR BLD CALC: 24.6 % — LOW (ref 39–50)
HGB BLD-MCNC: 8.3 G/DL — LOW (ref 13–17)
MAGNESIUM SERPL-MCNC: 2 MG/DL — SIGNIFICANT CHANGE UP (ref 1.6–2.6)
MAGNESIUM SERPL-MCNC: 2 MG/DL — SIGNIFICANT CHANGE UP (ref 1.6–2.6)
MCHC RBC-ENTMCNC: 29.3 PG — SIGNIFICANT CHANGE UP (ref 27–34)
MCHC RBC-ENTMCNC: 29.3 PG — SIGNIFICANT CHANGE UP (ref 27–34)
MCHC RBC-ENTMCNC: 29.6 PG — SIGNIFICANT CHANGE UP (ref 27–34)
MCHC RBC-ENTMCNC: 29.6 PG — SIGNIFICANT CHANGE UP (ref 27–34)
MCHC RBC-ENTMCNC: 33.7 GM/DL — SIGNIFICANT CHANGE UP (ref 32–36)
MCHC RBC-ENTMCNC: 33.7 GM/DL — SIGNIFICANT CHANGE UP (ref 32–36)
MCHC RBC-ENTMCNC: 33.9 GM/DL — SIGNIFICANT CHANGE UP (ref 32–36)
MCHC RBC-ENTMCNC: 33.9 GM/DL — SIGNIFICANT CHANGE UP (ref 32–36)
MCV RBC AUTO: 86.6 FL — SIGNIFICANT CHANGE UP (ref 80–100)
MCV RBC AUTO: 86.6 FL — SIGNIFICANT CHANGE UP (ref 80–100)
MCV RBC AUTO: 87.9 FL — SIGNIFICANT CHANGE UP (ref 80–100)
MCV RBC AUTO: 87.9 FL — SIGNIFICANT CHANGE UP (ref 80–100)
NRBC # BLD: 0 /100 WBCS — SIGNIFICANT CHANGE UP (ref 0–0)
NRBC # BLD: 0 /100 WBCS — SIGNIFICANT CHANGE UP (ref 0–0)
NRBC # BLD: 1 /100 WBCS — HIGH (ref 0–0)
NRBC # BLD: 1 /100 WBCS — HIGH (ref 0–0)
NRBC # FLD: 0.1 K/UL — HIGH (ref 0–0)
NRBC # FLD: 0.1 K/UL — HIGH (ref 0–0)
NRBC # FLD: 0.18 K/UL — HIGH (ref 0–0)
NRBC # FLD: 0.18 K/UL — HIGH (ref 0–0)
PHOSPHATE SERPL-MCNC: 2.6 MG/DL — SIGNIFICANT CHANGE UP (ref 2.5–4.5)
PHOSPHATE SERPL-MCNC: 2.6 MG/DL — SIGNIFICANT CHANGE UP (ref 2.5–4.5)
PLATELET # BLD AUTO: 261 K/UL — SIGNIFICANT CHANGE UP (ref 150–400)
PLATELET # BLD AUTO: 261 K/UL — SIGNIFICANT CHANGE UP (ref 150–400)
PLATELET # BLD AUTO: 267 K/UL — SIGNIFICANT CHANGE UP (ref 150–400)
PLATELET # BLD AUTO: 267 K/UL — SIGNIFICANT CHANGE UP (ref 150–400)
POTASSIUM SERPL-MCNC: 3.6 MMOL/L — SIGNIFICANT CHANGE UP (ref 3.5–5.3)
POTASSIUM SERPL-MCNC: 3.6 MMOL/L — SIGNIFICANT CHANGE UP (ref 3.5–5.3)
POTASSIUM SERPL-SCNC: 3.6 MMOL/L — SIGNIFICANT CHANGE UP (ref 3.5–5.3)
POTASSIUM SERPL-SCNC: 3.6 MMOL/L — SIGNIFICANT CHANGE UP (ref 3.5–5.3)
PROT SERPL-MCNC: 5.9 G/DL — LOW (ref 6–8.3)
PROT SERPL-MCNC: 5.9 G/DL — LOW (ref 6–8.3)
RBC # BLD: 2.8 M/UL — LOW (ref 4.2–5.8)
RBC # BLD: 2.8 M/UL — LOW (ref 4.2–5.8)
RBC # BLD: 2.83 M/UL — LOW (ref 4.2–5.8)
RBC # BLD: 2.83 M/UL — LOW (ref 4.2–5.8)
RBC # FLD: 18.6 % — HIGH (ref 10.3–14.5)
RBC # FLD: 18.6 % — HIGH (ref 10.3–14.5)
RBC # FLD: 18.8 % — HIGH (ref 10.3–14.5)
RBC # FLD: 18.8 % — HIGH (ref 10.3–14.5)
RH IG SCN BLD-IMP: POSITIVE — SIGNIFICANT CHANGE UP
RH IG SCN BLD-IMP: POSITIVE — SIGNIFICANT CHANGE UP
SODIUM SERPL-SCNC: 142 MMOL/L — SIGNIFICANT CHANGE UP (ref 135–145)
SODIUM SERPL-SCNC: 142 MMOL/L — SIGNIFICANT CHANGE UP (ref 135–145)
WBC # BLD: 11.83 K/UL — HIGH (ref 3.8–10.5)
WBC # BLD: 11.83 K/UL — HIGH (ref 3.8–10.5)
WBC # BLD: 13.07 K/UL — HIGH (ref 3.8–10.5)
WBC # BLD: 13.07 K/UL — HIGH (ref 3.8–10.5)
WBC # FLD AUTO: 11.83 K/UL — HIGH (ref 3.8–10.5)
WBC # FLD AUTO: 11.83 K/UL — HIGH (ref 3.8–10.5)
WBC # FLD AUTO: 13.07 K/UL — HIGH (ref 3.8–10.5)
WBC # FLD AUTO: 13.07 K/UL — HIGH (ref 3.8–10.5)

## 2024-01-04 PROCEDURE — 99231 SBSQ HOSP IP/OBS SF/LOW 25: CPT

## 2024-01-04 PROCEDURE — 99232 SBSQ HOSP IP/OBS MODERATE 35: CPT

## 2024-01-04 PROCEDURE — 99233 SBSQ HOSP IP/OBS HIGH 50: CPT

## 2024-01-04 RX ORDER — INSULIN LISPRO 100/ML
VIAL (ML) SUBCUTANEOUS EVERY 6 HOURS
Refills: 0 | Status: DISCONTINUED | OUTPATIENT
Start: 2024-01-04 | End: 2024-01-09

## 2024-01-04 RX ORDER — POTASSIUM CHLORIDE 20 MEQ
10 PACKET (EA) ORAL
Refills: 0 | Status: COMPLETED | OUTPATIENT
Start: 2024-01-04 | End: 2024-01-04

## 2024-01-04 RX ORDER — LEVETIRACETAM 250 MG/1
250 TABLET, FILM COATED ORAL EVERY 12 HOURS
Refills: 0 | Status: DISCONTINUED | OUTPATIENT
Start: 2024-01-04 | End: 2024-01-19

## 2024-01-04 RX ORDER — POTASSIUM PHOSPHATE, MONOBASIC POTASSIUM PHOSPHATE, DIBASIC 236; 224 MG/ML; MG/ML
30 INJECTION, SOLUTION INTRAVENOUS ONCE
Refills: 0 | Status: COMPLETED | OUTPATIENT
Start: 2024-01-04 | End: 2024-01-04

## 2024-01-04 RX ORDER — DEXTROSE MONOHYDRATE, SODIUM CHLORIDE, AND POTASSIUM CHLORIDE 50; .745; 4.5 G/1000ML; G/1000ML; G/1000ML
1000 INJECTION, SOLUTION INTRAVENOUS
Refills: 0 | Status: DISCONTINUED | OUTPATIENT
Start: 2024-01-04 | End: 2024-01-06

## 2024-01-04 RX ORDER — METOPROLOL TARTRATE 50 MG
5 TABLET ORAL EVERY 6 HOURS
Refills: 0 | Status: DISCONTINUED | OUTPATIENT
Start: 2024-01-04 | End: 2024-01-13

## 2024-01-04 RX ORDER — ACETAMINOPHEN 500 MG
1000 TABLET ORAL ONCE
Refills: 0 | Status: COMPLETED | OUTPATIENT
Start: 2024-01-04 | End: 2024-01-04

## 2024-01-04 RX ORDER — ASPIRIN/CALCIUM CARB/MAGNESIUM 324 MG
300 TABLET ORAL DAILY
Refills: 0 | Status: DISCONTINUED | OUTPATIENT
Start: 2024-01-04 | End: 2024-01-05

## 2024-01-04 RX ORDER — ACETAMINOPHEN 500 MG
1000 TABLET ORAL EVERY 6 HOURS
Refills: 0 | Status: DISCONTINUED | OUTPATIENT
Start: 2024-01-04 | End: 2024-01-13

## 2024-01-04 RX ORDER — CALCIUM GLUCONATE 100 MG/ML
1 VIAL (ML) INTRAVENOUS ONCE
Refills: 0 | Status: COMPLETED | OUTPATIENT
Start: 2024-01-04 | End: 2024-01-04

## 2024-01-04 RX ADMIN — Medication 5 MILLIGRAM(S): at 13:31

## 2024-01-04 RX ADMIN — PANTOPRAZOLE SODIUM 10 MG/HR: 20 TABLET, DELAYED RELEASE ORAL at 22:19

## 2024-01-04 RX ADMIN — Medication 100 GRAM(S): at 04:36

## 2024-01-04 RX ADMIN — Medication 3 MILLILITER(S): at 08:53

## 2024-01-04 RX ADMIN — SODIUM CHLORIDE 50 MILLILITER(S): 9 INJECTION INTRAMUSCULAR; INTRAVENOUS; SUBCUTANEOUS at 08:34

## 2024-01-04 RX ADMIN — POTASSIUM PHOSPHATE, MONOBASIC POTASSIUM PHOSPHATE, DIBASIC 83.33 MILLIMOLE(S): 236; 224 INJECTION, SOLUTION INTRAVENOUS at 08:34

## 2024-01-04 RX ADMIN — HEPARIN SODIUM 5000 UNIT(S): 5000 INJECTION INTRAVENOUS; SUBCUTANEOUS at 22:17

## 2024-01-04 RX ADMIN — Medication 100 MILLIEQUIVALENT(S): at 07:00

## 2024-01-04 RX ADMIN — INSULIN GLARGINE 14 UNIT(S): 100 INJECTION, SOLUTION SUBCUTANEOUS at 22:23

## 2024-01-04 RX ADMIN — Medication 100 MILLIEQUIVALENT(S): at 04:36

## 2024-01-04 RX ADMIN — Medication 5 MILLIGRAM(S): at 19:20

## 2024-01-04 RX ADMIN — Medication 300 MILLIGRAM(S): at 11:36

## 2024-01-04 RX ADMIN — LEVETIRACETAM 250 MILLIGRAM(S): 250 TABLET, FILM COATED ORAL at 17:54

## 2024-01-04 RX ADMIN — HEPARIN SODIUM 5000 UNIT(S): 5000 INJECTION INTRAVENOUS; SUBCUTANEOUS at 06:04

## 2024-01-04 RX ADMIN — Medication 3 MILLILITER(S): at 21:10

## 2024-01-04 RX ADMIN — Medication 400 MILLIGRAM(S): at 11:37

## 2024-01-04 RX ADMIN — DORNASE ALFA 2.5 MILLIGRAM(S): 1 SOLUTION RESPIRATORY (INHALATION) at 21:18

## 2024-01-04 RX ADMIN — Medication 100 MILLIEQUIVALENT(S): at 05:51

## 2024-01-04 RX ADMIN — MOMETASONE FUROATE 2 PUFF(S): 220 INHALANT RESPIRATORY (INHALATION) at 21:10

## 2024-01-04 RX ADMIN — Medication 400 MILLIGRAM(S): at 04:24

## 2024-01-04 RX ADMIN — Medication 5 MILLIGRAM(S): at 08:34

## 2024-01-04 RX ADMIN — HEPARIN SODIUM 5000 UNIT(S): 5000 INJECTION INTRAVENOUS; SUBCUTANEOUS at 13:53

## 2024-01-04 RX ADMIN — DEXTROSE MONOHYDRATE, SODIUM CHLORIDE, AND POTASSIUM CHLORIDE 75 MILLILITER(S): 50; .745; 4.5 INJECTION, SOLUTION INTRAVENOUS at 11:00

## 2024-01-04 RX ADMIN — DORNASE ALFA 2.5 MILLIGRAM(S): 1 SOLUTION RESPIRATORY (INHALATION) at 08:53

## 2024-01-04 RX ADMIN — Medication 2: at 17:54

## 2024-01-04 RX ADMIN — MOMETASONE FUROATE 2 PUFF(S): 220 INHALANT RESPIRATORY (INHALATION) at 08:57

## 2024-01-04 RX ADMIN — Medication 1000 MILLIGRAM(S): at 18:30

## 2024-01-04 RX ADMIN — Medication 1000 MILLIGRAM(S): at 04:54

## 2024-01-04 RX ADMIN — Medication 400 MILLIGRAM(S): at 17:54

## 2024-01-04 RX ADMIN — DEXTROSE MONOHYDRATE, SODIUM CHLORIDE, AND POTASSIUM CHLORIDE 75 MILLILITER(S): 50; .745; 4.5 INJECTION, SOLUTION INTRAVENOUS at 22:23

## 2024-01-04 RX ADMIN — PANTOPRAZOLE SODIUM 10 MG/HR: 20 TABLET, DELAYED RELEASE ORAL at 08:34

## 2024-01-04 RX ADMIN — Medication 1000 MILLIGRAM(S): at 12:00

## 2024-01-04 NOTE — PROGRESS NOTE ADULT - ATTENDING COMMENTS
Hemodynamically stable  Hg 8.3 from 9.5  consider restarting Asa 81 to prevent SMA stent thrombosis  GI reeval

## 2024-01-04 NOTE — PROGRESS NOTE ADULT - ASSESSMENT
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 CTA demonstrating mesenteric fat stranding associated with ascending/transverse colon. S/p SMA angiography with stent placement with diagnostic laparoscopy 12/24, small bowel and visible colon viable, some inflammation of omentum in RUQ. Course c/b GI bleed, s/p scope on 1/2 with GI with clips placed.     Plan:   - Diet: CLD, ADAT per GI  - Pain control PRN  - protonix infusion x72 hrs post scope, then protonix bid   - continue keppra  - acute cholecystitis s/p IV abx, tolerating abx.  - please resume ASA. 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 CTA demonstrating mesenteric fat stranding associated with ascending/transverse colon. S/p SMA angiography with stent placement with diagnostic laparoscopy 12/24, small bowel and visible colon viable, some inflammation of omentum in RUQ. Course c/b GI bleed, s/p scope on 1/2 with GI with clips placed.     Plan:   - Diet: CLD, ADAT per GI  - Pain control PRN  - protonix infusion x72 hrs post scope, then protonix bid   - continue keppra  - acute cholecystitis s/p IV abx, tolerating abx.  - please resume ASA. can hold Brillinta until normal BM   - Appreciate excellent care per SICU     C Team Surgery   [phone]

## 2024-01-04 NOTE — PROGRESS NOTE ADULT - ATTENDING COMMENTS
I agree with the detailed interval history, physical, and plan, which I have reviewed and edited where appropriate'; also agree with notes/assessment with my team on service.  I have personally examined the patient.  I was physically present for the key portions of the evaluation and management (E/M) service provided.  I reviewed all the pertinent data.  The patient is a critical care patient with life threatening hemodynamic and metabolic instability in SICU.  The SICU team has a constant risk benefit analyzes discussion and coordinating care with the primary team and all consultants.   The patient is in SICU with the chief complaint and diagnosis mentioned in the note.   The plan will be specified in the note.  89M with history of CAD s/p CABG s/p stents; s/p diagnostic laparoscopy and SMA stent placement with brachial cutdown in SICU for HD monitoring  complicated by UGIB with melena.  Awake  Lung clear  Heart   PLAN:   Neurologic:  - Keppra  -Tylenol  Respiratory:  -Maintain O2 saturation >92%  Cardiovascular:   -Metoprolol  -POCUS  Gastrointestinal/Nutrition:  -Diet: NPO  Genitourinary/Renal:  - Monitor-Uo  Hematologic:  -FU CBC  -SQH   Infectious Disease:  -Monitor fever curve   Endocrine:  -T2DM   -Jeremiah HAYS 14U  -Monitor glucose     Disposition: SICU

## 2024-01-04 NOTE — PROGRESS NOTE ADULT - NS ATTEND AMEND GEN_ALL_CORE FT
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    Hgb stable over 48h, VSS, abd soft and nontender, one smear of dark stools in 24h per nursing.   Currently no s/s of ongoing or recurrent GIB. On day 2 of PPI gtt following EGD, will need to continue through tomorrow afternoon and then can be converted to BID dosing (PO or IV) for 2 weeks. Please alert our service if hgb dropping >1.5g in 24h or recurrent melena. Restart Brilinta per Vasc Surg.

## 2024-01-04 NOTE — PROGRESS NOTE ADULT - SUBJECTIVE AND OBJECTIVE BOX
Interval Events: pt seen and examined. family at bedside. pt resting in bed, denies abd pain. per family pt w/ increased cough and difficulty swallowing. per rn has fecal pouch in place- had smear of dark stool overnight, otherwsie no cge/hematemesis, no overt melena. hgb trend noted, bun downtrending, remains hds      Allergies:  fluoroquinolone antibiotics (Other)  Cipro (Unknown)  Tegretol (Unknown)  carbamazepine (Other)      Hospital Medications:  albuterol/ipratropium for Nebulization 3 milliLiter(s) Nebulizer every 12 hours  dextrose 50% Injectable 25 Gram(s) IV Push once  dextrose 50% Injectable 12.5 Gram(s) IV Push once  dornase cierra Solution 2.5 milliGRAM(s) Inhalation every 12 hours  escitalopram 10 milliGRAM(s) Oral daily  gabapentin 100 milliGRAM(s) Oral two times a day  heparin   Injectable 5000 Unit(s) SubCutaneous every 8 hours  insulin glargine Injectable (LANTUS) 14 Unit(s) SubCutaneous at bedtime  insulin lispro (ADMELOG) corrective regimen sliding scale   SubCutaneous three times a day before meals  levETIRAcetam   Injectable 250 milliGRAM(s) IV Push every 12 hours  memantine 5 milliGRAM(s) Oral two times a day  metoprolol tartrate Injectable 5 milliGRAM(s) IV Push every 6 hours  mometasone 110 MICROgram(s) Inhaler 2 Puff(s) Inhalation every 12 hours  pantoprazole Infusion 8 mG/Hr IV Continuous <Continuous>  ranolazine 500 milliGRAM(s) Oral two times a day  sodium chloride 0.9%. 1000 milliLiter(s) IV Continuous <Continuous>  sucralfate 1 Gram(s) Oral every 12 hours      ROS: As per HPI, otherwise 10-point ROS reviewed and negative.    Vital Signs:  Vital Signs Last 24 Hrs  T(C): 36.5 (2024 08:00), Max: 36.7 (2024 12:00)  T(F): 97.7 (2024 08:00), Max: 98 (2024 12:00)  HR: 83 (2024 08:30) (74 - 94)  BP: 163/63 (2024 08:30) (127/47 - 195/76)  BP(mean): 92 (2024 08:30) (70 - 106)  RR: 16 (2024 08:30) (15 - 24)  SpO2: 100% (2024 08:30) (97% - 100%)    Parameters below as of 2024 08:30  Patient On (Oxygen Delivery Method): room air      Daily     Daily Weight in k.5 (2024 04:00)      24 @ 07:01  -  24 @ 07:00  --------------------------------------------------------  IN: 1690 mL / OUT: 775 mL / NET: 915 mL        LABS:                        8.3    13.07 )-----------( 261      ( 2024 00:40 )             24.5     Mean Cell Volume: 86.6 fL (24 @ 00:40)        142  |  109<H>  |  30<H>  ----------------------------<  161<H>  3.6   |  19<L>  |  1.51<H>    Ca    7.8<L>      2024 00:40  Phos  2.6     -04  Mg     2.00     -04    TPro  5.9<L>  /  Alb  2.7<L>  /  TBili  0.7  /  DBili  x   /  AST  36  /  ALT  25  /  AlkPhos  57  01-04    LIVER FUNCTIONS - ( 2024 00:40 )  Alb: 2.7 g/dL / Pro: 5.9 g/dL / ALK PHOS: 57 U/L / ALT: 25 U/L / AST: 36 U/L / GGT: x           PT/INR - ( 2024 03:47 )   PT: 12.9 sec;   INR: 1.16 ratio         PTT - ( 2024 03:47 )  PTT:33.1 sec  Urinalysis Basic - ( 2024 00:40 )    Color: x / Appearance: x / SG: x / pH: x  Gluc: 161 mg/dL / Ketone: x  / Bili: x / Urobili: x   Blood: x / Protein: x / Nitrite: x   Leuk Esterase: x / RBC: x / WBC x   Sq Epi: x / Non Sq Epi: x / Bacteria: x                              8.3    13.07 )-----------( 261      ( 2024 00:40 )             24.5                         9.5    14.81 )-----------( 276      ( 2024 17:36 )             27.7                         9.0    14.38 )-----------( 254      ( 2024 03:47 )             24.8                         6.8    13.91 )-----------( 275      ( 2024 22:13 )             19.5                         7.0    16.29 )-----------( 288      ( 2024 21:12 )             19.8       PHYSICAL EXAM  GENERAL: lying in bed, in no apparent distress  HEENT: NCAT  NECK: trachea midline  CHEST:  respirations even, non labored, increased cough/upper airway congestion  ABDOMEN:  soft, distended, non-tender, scattered areas of ecchymosis  EXTREMITIES: WWP  SKIN:  warm/dry, no visible rash appreciated  PSYCH: awake responsive  NEURO: no tremor noted

## 2024-01-04 NOTE — PROGRESS NOTE ADULT - ASSESSMENT
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 Surgery consulted on 12/24 for abdominal pain and distension. CTA AP obtained which showed  partially occlusive calcified and non-calcified plaque just distal to take-off of the SMA, with estimated at least 75% luminal narrowing. Limited evaluation of its distal branches. Patient taken emergently to OR on 12/24 with general surgery and vascular surgery. Patient s/p diagnostic laparoscopy and SMA stent placement with brachial cutdown. SICU consulted postoperatively for HD monitoring. Course complicated by UGIB requiring 3u pRBCs on 1/1/24. Plan for EGD with GI on 1/2/24    PLAN:   Neurologic:  -A&Ox2 (baseline per family)   -Continue Keppra- hx of myoclonic jerks   -Pain: Tylenol  -PO Lexapro/ Memantine    Respiratory:  -Maintain O2 saturation >92%  -COVID + (12/20)  - duonebs,pulmozyme, flovent    Cardiovascular: htn  -MAP goal >65  -Metoprolol 5q6  -Rectal ASA, hold i/s/o bleed     Gastrointestinal/Nutrition:  -Diet: CLD   -RUQ US 12/26-> GB distended with cholelithiasis, equivocal   -Trial abx for cholecystitis, no plan for percutaneous cholecystostomy at this time  -PPI gtt for 72 hours   -S/p EGD with clipping of bleeding gastric ulcer on 1/2/24  -Mesenteric duplex 1/3 with no acute findings  -Sucralfate for GI ppx  -Monitor rectal pouch output    Genitourinary/Renal:  - Hx of CKD (Baseline Cr 1.2-1.3)   - Monitor strict I/Os   - NS @ 50     Hematologic:  -DVT ppx: SQH   -Rectal ASA- Holding   -Brilinta qd- Holding since NPO, UGIB    Infectious Disease:  -Monitor WBC   -Monitor fever curve     Endocrine:  -T2DM   -MISS, Lantus 14 qhs while NPO   -Monitor blood glucose     Disposition: SICU 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 Surgery consulted on 12/24 for abdominal pain and distension. CTA AP obtained which showed  partially occlusive calcified and non-calcified plaque just distal to take-off of the SMA, with estimated at least 75% luminal narrowing. Limited evaluation of its distal branches. Patient taken emergently to OR on 12/24 with general surgery and vascular surgery. Patient s/p diagnostic laparoscopy and SMA stent placement with brachial cutdown. SICU consulted postoperatively for HD monitoring. Course complicated by UGIB requiring 3u pRBCs on 1/1/24. Plan for EGD with GI on 1/2/24    PLAN:   Neurologic:  -A&Ox2 (baseline per family)   -Continue Keppra- hx of myoclonic jerks   -Pain: Tylenol  -PO Lexapro/ Memantine    Respiratory:  -Maintain O2 saturation >92%  -COVID + (12/20)  -Duonebs, Pulmozyme, Flovent     Cardiovascular: htn  -MAP goal >65  -Metoprolol 5q6  -Rectal ASA    Gastrointestinal/Nutrition:  -Diet: NPO 2/2 dysphagia   -RUQ US 12/26-> GB distended with cholelithiasis, equivocal   -Trial abx for cholecystitis, no plan for percutaneous cholecystostomy at this time  -PPI gtt for 72 hours   -S/p EGD with clipping of bleeding gastric ulcer on 1/2/24  -Mesenteric duplex 1/3 with no acute findings  -Sucralfate for GI ppx  -Monitor rectal pouch output    Genitourinary/Renal:  - Hx of CKD (Baseline Cr 1.2-1.3)   - Monitor strict I/Os   - mIVF      Hematologic:  -DVT ppx: SQH   -Rectal ASA    Infectious Disease:  -Monitor WBC   -Monitor fever curve     Endocrine:  -T2DM   -MISS, Lantus 14 qhs while NPO   -Monitor blood glucose     Disposition: SICU

## 2024-01-04 NOTE — PROGRESS NOTE ADULT - ASSESSMENT
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 Hospitalization with CTA demonstrating mesenteric fat stranding associated with ascending/transverse colon. S/p SMA angiography with stent placement with diagnostic laparoscopy 12/24, small bowel and visible colon viable, some inflammation of omentum in RUQ. 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     #Hematemesis/UGIB - clinically resolving, sp EGD as above- 2/2 Dieulafoy lesion s/p clipping and NGT trauma s/p clipping  1/4- per nursing no overt melena overnight, had smear of dark stool, otherwise no cge/hematemesis, remains HDS, hgb flux: 9-- 9.5-- 8.3, though BUN continues to downtrend  #Normocytic anemia  #S/p SMA angiography with stent placement with diagnostic laparoscopy 12/24, prior on asa/brillinta  #Acute cholecystitis sp abx    Recommendations-     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 Hospitalization with CTA demonstrating mesenteric fat stranding associated with ascending/transverse colon. S/p SMA angiography with stent placement with diagnostic laparoscopy 12/24, small bowel and visible colon viable, some inflammation of omentum in RUQ. 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     #Hematemesis/UGIB - clinically resolving, sp EGD as above- 2/2 Dieulafoy lesion s/p clipping and NGT trauma s/p clipping  1/4- per nursing no overt melena overnight, had smear of dark stool, otherwise no cge/hematemesis, remains HDS, hgb flux: 9-- 9.5-- 8.3-- 8.3, BUN continues to downtrend  #Normocytic anemia  #S/p SMA angiography with stent placement with diagnostic laparoscopy 12/24, prior on asa/brillinta  #Acute cholecystitis sp abx    Recommendations-  - maintain active t&s and adequate IV access  - trend cbc, transfuse for Hgb > 8  - PPI gtt x 72 hrs post procedure then switch to BID dosing  - no present plans for repeat EGD- HDS w/ stable HH x ~12 hrs, no further hematemesis or overt melena and w/ downtrending bun- thus lower suspicion for active gib  - if patient becomes HD unstable or with recurrent hematemesis, would recommend intubation if within GOC and make NPO for potential repeat EGD; if patient HD unstable/with briskly dropping hgb w/o overt bleeding would consider CTA AP  - defer AP to primary team/vascular surgery given that patient likely needs to continue with DAPT for recent stent   - no GI objection to liquid diet as tolerated, though would consider SLP input  - pls notify GI team of any acute clinical status changes  - rest of care per primary team     [facility]    HUBER Whitley PA-C, RD, CDN  available on TEAMS for questions  after 5pm/weekends, please contact the on-call GI fellow at [phone]    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 Hospitalization with CTA demonstrating mesenteric fat stranding associated with ascending/transverse colon. S/p SMA angiography with stent placement with diagnostic laparoscopy 12/24, small bowel and visible colon viable, some inflammation of omentum in RUQ. 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     #Hematemesis/UGIB - clinically resolving, sp EGD as above- 2/2 Dieulafoy lesion s/p clipping and NGT trauma s/p clipping  1/4- per nursing no overt melena overnight, had smear of dark stool, otherwise no cge/hematemesis, remains HDS, hgb flux: 9-- 9.5-- 8.3-- 8.3, BUN continues to downtrend  #Normocytic anemia  #S/p SMA angiography with stent placement with diagnostic laparoscopy 12/24, prior on asa/brillinta  #Acute cholecystitis sp abx    Recommendations-  - maintain active t&s and adequate IV access  - trend cbc, transfuse for Hgb > 8  - PPI gtt x 72 hrs post procedure then switch to BID dosing  - no present plans for repeat EGD- HDS w/ stable HH x ~12 hrs, no further hematemesis or overt melena and w/ downtrending bun- thus lower suspicion for active gib  - if patient becomes HD unstable or with recurrent hematemesis, would recommend intubation if within GOC and make NPO for potential repeat EGD; if patient HD unstable/with briskly dropping hgb w/o overt bleeding would consider CTA AP  - defer AP to primary team/vascular surgery given that patient likely needs to continue with DAPT for recent stent   - no GI objection to liquid diet as tolerated, though would consider SLP input  - pls notify GI team of any acute clinical status changes  - rest of care per primary team     [facility]    HUBER Whitley PA-C, RD, CDN  available on TEAMS for questions  after 5pm/weekends, please contact the on-call GI fellow at [phone]

## 2024-01-04 NOTE — PROGRESS NOTE ADULT - SUBJECTIVE AND OBJECTIVE BOX
Interval Events:  - No further episodes of melena overnight  - Start on CLD   - Resume ASA, Brilinta on 1/4  - Added sucralfate    NEURO  RASS (if intubated): 		[facility] ICU (if concern for delirium):  Exam: somnolent  Meds: escitalopram 10 milliGRAM(s) Oral daily  gabapentin 100 milliGRAM(s) Oral two times a day  levETIRAcetam 250 milliGRAM(s) Oral two times a day  memantine 5 milliGRAM(s) Oral two times a day      RESPIRATORY  RR: 21 (01-03-24 @ 23:00) (15 - 31)  SpO2: 100% (01-03-24 @ 23:00) (96% - 100%)  Wt(kg): --  Exam: nonlabored respirations with congestion  Mechanical Ventilation:     Meds: albuterol/ipratropium for Nebulization 3 milliLiter(s) Nebulizer every 12 hours  dornase cierra Solution 2.5 milliGRAM(s) Inhalation every 12 hours  mometasone 110 MICROgram(s) Inhaler 2 Puff(s) Inhalation every 12 hours      CARDIOVASCULAR  HR: 88 (01-03-24 @ 23:00) (70 - 93)  BP: 156/70 (01-03-24 @ 23:00) (127/47 - 195/76)  BP(mean): 86 (01-03-24 @ 23:00) (70 - 106)  ABP: --  ABP(mean): --  Wt(kg): --  CVP(cm H2O): --      Exam: pulse present   Meds: metoprolol tartrate 25 milliGRAM(s) Oral two times a day  ranolazine 500 milliGRAM(s) Oral two times a day      GI/NUTRITION  Exam: soft, mild tenderness, mild distention   Diet: CLD  Meds: pantoprazole Infusion 8 mG/Hr IV Continuous <Continuous>  sucralfate 1 Gram(s) Oral every 12 hours      GENITOURINARY  I&O's Detail    01-02 @ 07:01  -  01-03 @ 07:00  --------------------------------------------------------  IN:    IV PiggyBack: 100 mL    Lactated Ringers: 2300 mL    Pantoprazole: 220 mL    PRBCs (Packed Red Blood Cells): 2 mL  Total IN: 2622 mL    OUT:    Stool (mL): 200 mL    Voided (mL): 1150 mL  Total OUT: 1350 mL    Total NET: 1272 mL      01-03 @ 07:01  -  01-04 @ 00:34  --------------------------------------------------------  IN:    IV PiggyBack: 100 mL    Lactated Ringers: 300 mL    Pantoprazole: 160 mL    sodium chloride 0.9%: 650 mL  Total IN: 1210 mL    OUT:    Stool (mL): 0 mL    Voided (mL): 450 mL  Total OUT: 450 mL    Total NET: 760 mL          01-03    141  |  107  |  39<H>  ----------------------------<  191<H>  4.0   |  21<L>  |  1.56<H>    Ca    7.8<L>      03 Jan 2024 03:47  Phos  2.8     01-03  Mg     2.00     01-03    TPro  5.5<L>  /  Alb  2.7<L>  /  TBili  0.9  /  DBili  x   /  AST  39  /  ALT  27  /  AlkPhos  56  01-03    Meds: sodium chloride 0.9%. 1000 milliLiter(s) IV Continuous <Continuous>      HEMATOLOGIC  Meds: heparin   Injectable 5000 Unit(s) SubCutaneous every 8 hours                          9.5    14.81 )-----------( 276      ( 03 Jan 2024 17:36 )             27.7     PT/INR - ( 03 Jan 2024 03:47 )   PT: 12.9 sec;   INR: 1.16 ratio         PTT - ( 03 Jan 2024 03:47 )  PTT:33.1 sec    INFECTIOUS DISEASES  T(C): 36.6 (01-03-24 @ 20:00), Max: 36.7 (01-03-24 @ 04:00)  Wt(kg): --  WBC Count: 14.81 K/uL (01-03 @ 17:36)  WBC Count: 14.38 K/uL (01-03 @ 03:47)    Recent Cultures:    Meds:     ENDOCRINE  Capillary Blood Glucose    Meds: dextrose 50% Injectable 25 Gram(s) IV Push once  dextrose 50% Injectable 12.5 Gram(s) IV Push once  insulin glargine Injectable (LANTUS) 14 Unit(s) SubCutaneous at bedtime  insulin lispro (ADMELOG) corrective regimen sliding scale   SubCutaneous three times a day before meals    OTHER MEDICATIONS:      IMAGING:

## 2024-01-04 NOTE — PROGRESS NOTE ADULT - SUBJECTIVE AND OBJECTIVE BOX
Aashish Boyer MD  Interventional Cardiology / Endovascular Specialist  [place] Office : [address] [place] [address] Tel:   [place] Office : [address] [facility] [address] [address]  Tel: [phone]      Subjective/Overnight events: Patient lying in bed in SICU. No acute distress.   	  MEDICATIONS:  heparin   Injectable 5000 Unit(s) SubCutaneous every 8 hours  metoprolol tartrate Injectable 5 milliGRAM(s) IV Push every 6 hours  ranolazine 500 milliGRAM(s) Oral two times a day      albuterol/ipratropium for Nebulization 3 milliLiter(s) Nebulizer every 12 hours  dornase cierra Solution 2.5 milliGRAM(s) Inhalation every 12 hours  mometasone 110 MICROgram(s) Inhaler 2 Puff(s) Inhalation every 12 hours    acetaminophen   IVPB .. 1000 milliGRAM(s) IV Intermittent every 6 hours PRN  aspirin Suppository 300 milliGRAM(s) Rectal daily  escitalopram 10 milliGRAM(s) Oral daily  gabapentin 100 milliGRAM(s) Oral two times a day  levETIRAcetam   Injectable 250 milliGRAM(s) IV Push every 12 hours  memantine 5 milliGRAM(s) Oral two times a day    pantoprazole Infusion 8 mG/Hr IV Continuous <Continuous>  sucralfate 1 Gram(s) Oral every 12 hours    dextrose 50% Injectable 25 Gram(s) IV Push once  dextrose 50% Injectable 12.5 Gram(s) IV Push once  insulin glargine Injectable (LANTUS) 14 Unit(s) SubCutaneous at bedtime  insulin lispro (ADMELOG) corrective regimen sliding scale   SubCutaneous every 6 hours    dextrose 5% + sodium chloride 0.45% with potassium chloride 20 mEq/L 1000 milliLiter(s) IV Continuous <Continuous>      PAST MEDICAL/SURGICAL HISTORY  PAST MEDICAL & SURGICAL HISTORY:  Hyperlipemia      Hypertension      Coronary Artery Disease      Diabetes Mellitus Type II      Stented Coronary Artery  total 5 stents, last stent 5/2019      Neuropathy      Myocardial infarction      Stroke  mild left facial numbness   no other residuals verbalized      Myoclonic jerking      Stage 3 chronic kidney disease      History of Cataract Extraction      Hx of CABG      H/O coronary angiogram      S/P coronary artery stent placement  1/6/09      S/P placement of cardiac pacemaker          SOCIAL HISTORY: Substance Use (street drugs): ( x ) never used  (  ) other:    FAMILY HISTORY:  No pertinent family history in first degree relatives            PHYSICAL EXAM:  T(C): 37.2 (01-04-24 @ 14:00), Max: 37.2 (01-04-24 @ 12:00)  HR: 92 (01-04-24 @ 14:00) (77 - 94)  BP: 138/59 (01-04-24 @ 14:00) (128/100 - 170/71)  RR: 16 (01-04-24 @ 14:00) (14 - 24)  SpO2: 99% (01-04-24 @ 14:00) (98% - 100%)  Wt(kg): --  I&O's Summary    03 Jan 2024 07:01  -  04 Jan 2024 07:00  --------------------------------------------------------  IN: 1690 mL / OUT: 775 mL / NET: 915 mL    04 Jan 2024 07:01  -  04 Jan 2024 15:21  --------------------------------------------------------  IN: 1110 mL / OUT: 400 mL / NET: 710 mL          GENERAL: NAD  EYES:  conjunctiva and sclera clear  ENMT: No tonsillar erythema, exudates, or enlargement  Cardiovascular: Normal S1 S2, No JVD, No murmurs, No edema  Respiratory: Lungs clear to auscultation	  Gastrointestinal:  Soft,   Extremities: No edema                                     8.3    11.83 )-----------( 267      ( 04 Jan 2024 11:34 )             24.6     01-04    142  |  109<H>  |  30<H>  ----------------------------<  161<H>  3.6   |  19<L>  |  1.51<H>    Ca    7.8<L>      04 Jan 2024 00:40  Phos  2.6     01-04  Mg     2.00     01-04    TPro  5.9<L>  /  Alb  2.7<L>  /  TBili  0.7  /  DBili  x   /  AST  36  /  ALT  25  /  AlkPhos  57  01-04    proBNP:   Lipid Profile:   HgA1c:   TSH:     Consultant(s) Notes Reviewed:  [x ] YES  [ ] NO    Care Discussed with Consultants/Other Providers [ x] YES  [ ] NO    Imaging Personally Reviewed independently:  [x] YES  [ ] NO    All labs, radiologic studies, vitals, orders and medications list reviewed. Patient is seen and examined at bedside. Case discussed with medical team.                 Aashish Boyer MD  Interventional Cardiology / Endovascular Specialist  [place] Office : [address] [place] [address] Tel:   [place] Office : [address] [facility] [address] [address]  Tel: [phone]      Subjective/Overnight events: Patient lying in bed in SICU. No acute distress.   	  MEDICATIONS:  heparin   Injectable 5000 Unit(s) SubCutaneous every 8 hours  metoprolol tartrate Injectable 5 milliGRAM(s) IV Push every 6 hours  ranolazine 500 milliGRAM(s) Oral two times a day      albuterol/ipratropium for Nebulization 3 milliLiter(s) Nebulizer every 12 hours  dornase cierra Solution 2.5 milliGRAM(s) Inhalation every 12 hours  mometasone 110 MICROgram(s) Inhaler 2 Puff(s) Inhalation every 12 hours    acetaminophen   IVPB .. 1000 milliGRAM(s) IV Intermittent every 6 hours PRN  aspirin Suppository 300 milliGRAM(s) Rectal daily  escitalopram 10 milliGRAM(s) Oral daily  gabapentin 100 milliGRAM(s) Oral two times a day  levETIRAcetam   Injectable 250 milliGRAM(s) IV Push every 12 hours  memantine 5 milliGRAM(s) Oral two times a day    pantoprazole Infusion 8 mG/Hr IV Continuous <Continuous>  sucralfate 1 Gram(s) Oral every 12 hours    dextrose 50% Injectable 25 Gram(s) IV Push once  dextrose 50% Injectable 12.5 Gram(s) IV Push once  insulin glargine Injectable (LANTUS) 14 Unit(s) SubCutaneous at bedtime  insulin lispro (ADMELOG) corrective regimen sliding scale   SubCutaneous every 6 hours    dextrose 5% + sodium chloride 0.45% with potassium chloride 20 mEq/L 1000 milliLiter(s) IV Continuous <Continuous>      PAST MEDICAL/SURGICAL HISTORY  PAST MEDICAL & SURGICAL HISTORY:  Hyperlipemia      Hypertension      Coronary Artery Disease      Diabetes Mellitus Type II      Stented Coronary Artery  total 5 stents, last stent 5/2019      Neuropathy      Myocardial infarction      Stroke  mild left facial numbness   no other residuals verbalized      Myoclonic jerking      Stage 3 chronic kidney disease      History of Cataract Extraction      Hx of CABG      H/O coronary angiogram      S/P coronary artery stent placement  1/6/09      S/P placement of cardiac pacemaker          SOCIAL HISTORY: Substance Use (street drugs): ( x ) never used  (  ) other:    FAMILY HISTORY:  No pertinent family history in first degree relatives            PHYSICAL EXAM:  T(C): 37.2 (01-04-24 @ 14:00), Max: 37.2 (01-04-24 @ 12:00)  HR: 92 (01-04-24 @ 14:00) (77 - 94)  BP: 138/59 (01-04-24 @ 14:00) (128/100 - 170/71)  RR: 16 (01-04-24 @ 14:00) (14 - 24)  SpO2: 99% (01-04-24 @ 14:00) (98% - 100%)  Wt(kg): --  I&O's Summary    03 Jan 2024 07:01  -  04 Jan 2024 07:00  --------------------------------------------------------  IN: 1690 mL / OUT: 775 mL / NET: 915 mL    04 Jan 2024 07:01  -  04 Jan 2024 15:21  --------------------------------------------------------  IN: 1110 mL / OUT: 400 mL / NET: 710 mL          GENERAL: NAD  EYES:  conjunctiva and sclera clear  ENMT: No tonsillar erythema, exudates, or enlargement  Cardiovascular: Normal S1 S2, No JVD, No murmurs, No edema  Respiratory: Lungs clear to auscultation	  Gastrointestinal:  Soft,   Extremities: No edema                                     8.3    11.83 )-----------( 267      ( 04 Jan 2024 11:34 )             24.6     01-04    142  |  109<H>  |  30<H>  ----------------------------<  161<H>  3.6   |  19<L>  |  1.51<H>    Ca    7.8<L>      04 Jan 2024 00:40  Phos  2.6     01-04  Mg     2.00     01-04    TPro  5.9<L>  /  Alb  2.7<L>  /  TBili  0.7  /  DBili  x   /  AST  36  /  ALT  25  /  AlkPhos  57  01-04    proBNP:   Lipid Profile:   HgA1c:   TSH:     Consultant(s) Notes Reviewed:  [x ] YES  [ ] NO    Care Discussed with Consultants/Other Providers [ x] YES  [ ] NO    Imaging Personally Reviewed independently:  [x] YES  [ ] NO    All labs, radiologic studies, vitals, orders and medications list reviewed. Patient is seen and examined at bedside. Case discussed with medical team.

## 2024-01-04 NOTE — PROGRESS NOTE ADULT - SUBJECTIVE AND OBJECTIVE BOX
Vascular Surgery Daily Progress Note  =====================================================    SUBJECTIVE: Patient seen and examined at bedside on AM rounds. Patient reports that they're feeling well. No melena overnight.    --------------------------------------------------------------------------------------    MEDICATIONS:    Neurologic Medications  escitalopram 10 milliGRAM(s) Oral daily  gabapentin 100 milliGRAM(s) Oral two times a day  levETIRAcetam   Injectable 250 milliGRAM(s) IV Push every 12 hours  memantine 5 milliGRAM(s) Oral two times a day    Respiratory Medications  albuterol/ipratropium for Nebulization 3 milliLiter(s) Nebulizer every 12 hours  dornase cierra Solution 2.5 milliGRAM(s) Inhalation every 12 hours  mometasone 110 MICROgram(s) Inhaler 2 Puff(s) Inhalation every 12 hours    Cardiovascular Medications  metoprolol tartrate Injectable 5 milliGRAM(s) IV Push every 6 hours  ranolazine 500 milliGRAM(s) Oral two times a day    Gastrointestinal Medications  pantoprazole Infusion 8 mG/Hr IV Continuous <Continuous>  potassium phosphate IVPB 30 milliMole(s) IV Intermittent once  sodium chloride 0.9%. 1000 milliLiter(s) IV Continuous <Continuous>  sucralfate 1 Gram(s) Oral every 12 hours    Genitourinary Medications    Hematologic/Oncologic Medications  heparin   Injectable 5000 Unit(s) SubCutaneous every 8 hours    Antimicrobial/Immunologic Medications    Endocrine/Metabolic Medications  dextrose 50% Injectable 25 Gram(s) IV Push once  dextrose 50% Injectable 12.5 Gram(s) IV Push once  insulin glargine Injectable (LANTUS) 14 Unit(s) SubCutaneous at bedtime  insulin lispro (ADMELOG) corrective regimen sliding scale   SubCutaneous three times a day before meals    Topical/Other Medications    --------------------------------------------------------------------------------------    VITAL SIGNS:  T(C): 36.4 (01-04-24 @ 04:00), Max: 36.7 (01-03-24 @ 12:00)  HR: 87 (01-04-24 @ 07:00) (70 - 94)  BP: 150/59 (01-04-24 @ 07:00) (127/47 - 195/76)  RR: 17 (01-04-24 @ 07:00) (15 - 24)  SpO2: 100% (01-04-24 @ 07:00) (97% - 100%)  --------------------------------------------------------------------------------------    EXAM    General: NAD, resting in bed comfortably.  Cardiac: regular rate, warm and well perfused  Respiratory: Nonlabored respirations, normal cw expansion.  Abdomen: soft, nontender, nondistended.   Extremities: normal strength, no deformities    --------------------------------------------------------------------------------------

## 2024-01-05 LAB
ALBUMIN SERPL ELPH-MCNC: 2.9 G/DL — LOW (ref 3.3–5)
ALBUMIN SERPL ELPH-MCNC: 2.9 G/DL — LOW (ref 3.3–5)
ALP SERPL-CCNC: 63 U/L — SIGNIFICANT CHANGE UP (ref 40–120)
ALP SERPL-CCNC: 63 U/L — SIGNIFICANT CHANGE UP (ref 40–120)
ALT FLD-CCNC: 25 U/L — SIGNIFICANT CHANGE UP (ref 4–41)
ALT FLD-CCNC: 25 U/L — SIGNIFICANT CHANGE UP (ref 4–41)
ANION GAP SERPL CALC-SCNC: 12 MMOL/L — SIGNIFICANT CHANGE UP (ref 7–14)
ANION GAP SERPL CALC-SCNC: 12 MMOL/L — SIGNIFICANT CHANGE UP (ref 7–14)
ANION GAP SERPL CALC-SCNC: 13 MMOL/L — SIGNIFICANT CHANGE UP (ref 7–14)
ANION GAP SERPL CALC-SCNC: 13 MMOL/L — SIGNIFICANT CHANGE UP (ref 7–14)
AST SERPL-CCNC: 44 U/L — HIGH (ref 4–40)
AST SERPL-CCNC: 44 U/L — HIGH (ref 4–40)
BILIRUB SERPL-MCNC: 0.7 MG/DL — SIGNIFICANT CHANGE UP (ref 0.2–1.2)
BILIRUB SERPL-MCNC: 0.7 MG/DL — SIGNIFICANT CHANGE UP (ref 0.2–1.2)
BUN SERPL-MCNC: 22 MG/DL — SIGNIFICANT CHANGE UP (ref 7–23)
BUN SERPL-MCNC: 22 MG/DL — SIGNIFICANT CHANGE UP (ref 7–23)
BUN SERPL-MCNC: 24 MG/DL — HIGH (ref 7–23)
BUN SERPL-MCNC: 24 MG/DL — HIGH (ref 7–23)
CALCIUM SERPL-MCNC: 8 MG/DL — LOW (ref 8.4–10.5)
CALCIUM SERPL-MCNC: 8 MG/DL — LOW (ref 8.4–10.5)
CALCIUM SERPL-MCNC: 8.1 MG/DL — LOW (ref 8.4–10.5)
CALCIUM SERPL-MCNC: 8.1 MG/DL — LOW (ref 8.4–10.5)
CHLORIDE SERPL-SCNC: 111 MMOL/L — HIGH (ref 98–107)
CO2 SERPL-SCNC: 16 MMOL/L — LOW (ref 22–31)
CO2 SERPL-SCNC: 16 MMOL/L — LOW (ref 22–31)
CO2 SERPL-SCNC: 19 MMOL/L — LOW (ref 22–31)
CO2 SERPL-SCNC: 19 MMOL/L — LOW (ref 22–31)
CREAT SERPL-MCNC: 1.41 MG/DL — HIGH (ref 0.5–1.3)
CREAT SERPL-MCNC: 1.41 MG/DL — HIGH (ref 0.5–1.3)
CREAT SERPL-MCNC: 1.45 MG/DL — HIGH (ref 0.5–1.3)
CREAT SERPL-MCNC: 1.45 MG/DL — HIGH (ref 0.5–1.3)
EGFR: 46 ML/MIN/1.73M2 — LOW
EGFR: 46 ML/MIN/1.73M2 — LOW
EGFR: 47 ML/MIN/1.73M2 — LOW
EGFR: 47 ML/MIN/1.73M2 — LOW
GLUCOSE SERPL-MCNC: 199 MG/DL — HIGH (ref 70–99)
GLUCOSE SERPL-MCNC: 199 MG/DL — HIGH (ref 70–99)
GLUCOSE SERPL-MCNC: 219 MG/DL — HIGH (ref 70–99)
GLUCOSE SERPL-MCNC: 219 MG/DL — HIGH (ref 70–99)
HCT VFR BLD CALC: 27.5 % — LOW (ref 39–50)
HCT VFR BLD CALC: 27.5 % — LOW (ref 39–50)
HCT VFR BLD CALC: 28.8 % — LOW (ref 39–50)
HCT VFR BLD CALC: 28.8 % — LOW (ref 39–50)
HGB BLD-MCNC: 9 G/DL — LOW (ref 13–17)
HGB BLD-MCNC: 9 G/DL — LOW (ref 13–17)
HGB BLD-MCNC: 9.2 G/DL — LOW (ref 13–17)
HGB BLD-MCNC: 9.2 G/DL — LOW (ref 13–17)
MAGNESIUM SERPL-MCNC: 1.9 MG/DL — SIGNIFICANT CHANGE UP (ref 1.6–2.6)
MCHC RBC-ENTMCNC: 29.4 PG — SIGNIFICANT CHANGE UP (ref 27–34)
MCHC RBC-ENTMCNC: 29.4 PG — SIGNIFICANT CHANGE UP (ref 27–34)
MCHC RBC-ENTMCNC: 29.6 PG — SIGNIFICANT CHANGE UP (ref 27–34)
MCHC RBC-ENTMCNC: 29.6 PG — SIGNIFICANT CHANGE UP (ref 27–34)
MCHC RBC-ENTMCNC: 31.9 GM/DL — LOW (ref 32–36)
MCHC RBC-ENTMCNC: 31.9 GM/DL — LOW (ref 32–36)
MCHC RBC-ENTMCNC: 32.7 GM/DL — SIGNIFICANT CHANGE UP (ref 32–36)
MCHC RBC-ENTMCNC: 32.7 GM/DL — SIGNIFICANT CHANGE UP (ref 32–36)
MCV RBC AUTO: 90.5 FL — SIGNIFICANT CHANGE UP (ref 80–100)
MCV RBC AUTO: 90.5 FL — SIGNIFICANT CHANGE UP (ref 80–100)
MCV RBC AUTO: 92 FL — SIGNIFICANT CHANGE UP (ref 80–100)
MCV RBC AUTO: 92 FL — SIGNIFICANT CHANGE UP (ref 80–100)
NRBC # BLD: 0 /100 WBCS — SIGNIFICANT CHANGE UP (ref 0–0)
NRBC # FLD: 0.04 K/UL — HIGH (ref 0–0)
NRBC # FLD: 0.04 K/UL — HIGH (ref 0–0)
NRBC # FLD: 0.08 K/UL — HIGH (ref 0–0)
NRBC # FLD: 0.08 K/UL — HIGH (ref 0–0)
PHOSPHATE SERPL-MCNC: 3.2 MG/DL — SIGNIFICANT CHANGE UP (ref 2.5–4.5)
PHOSPHATE SERPL-MCNC: 3.2 MG/DL — SIGNIFICANT CHANGE UP (ref 2.5–4.5)
PHOSPHATE SERPL-MCNC: 3.3 MG/DL — SIGNIFICANT CHANGE UP (ref 2.5–4.5)
PHOSPHATE SERPL-MCNC: 3.3 MG/DL — SIGNIFICANT CHANGE UP (ref 2.5–4.5)
PLATELET # BLD AUTO: 313 K/UL — SIGNIFICANT CHANGE UP (ref 150–400)
PLATELET # BLD AUTO: 313 K/UL — SIGNIFICANT CHANGE UP (ref 150–400)
PLATELET # BLD AUTO: 314 K/UL — SIGNIFICANT CHANGE UP (ref 150–400)
PLATELET # BLD AUTO: 314 K/UL — SIGNIFICANT CHANGE UP (ref 150–400)
POTASSIUM SERPL-MCNC: 5 MMOL/L — SIGNIFICANT CHANGE UP (ref 3.5–5.3)
POTASSIUM SERPL-MCNC: 5 MMOL/L — SIGNIFICANT CHANGE UP (ref 3.5–5.3)
POTASSIUM SERPL-MCNC: 5.1 MMOL/L — SIGNIFICANT CHANGE UP (ref 3.5–5.3)
POTASSIUM SERPL-MCNC: 5.1 MMOL/L — SIGNIFICANT CHANGE UP (ref 3.5–5.3)
POTASSIUM SERPL-SCNC: 5 MMOL/L — SIGNIFICANT CHANGE UP (ref 3.5–5.3)
POTASSIUM SERPL-SCNC: 5 MMOL/L — SIGNIFICANT CHANGE UP (ref 3.5–5.3)
POTASSIUM SERPL-SCNC: 5.1 MMOL/L — SIGNIFICANT CHANGE UP (ref 3.5–5.3)
POTASSIUM SERPL-SCNC: 5.1 MMOL/L — SIGNIFICANT CHANGE UP (ref 3.5–5.3)
PROT SERPL-MCNC: 6.4 G/DL — SIGNIFICANT CHANGE UP (ref 6–8.3)
PROT SERPL-MCNC: 6.4 G/DL — SIGNIFICANT CHANGE UP (ref 6–8.3)
RBC # BLD: 3.04 M/UL — LOW (ref 4.2–5.8)
RBC # BLD: 3.04 M/UL — LOW (ref 4.2–5.8)
RBC # BLD: 3.13 M/UL — LOW (ref 4.2–5.8)
RBC # BLD: 3.13 M/UL — LOW (ref 4.2–5.8)
RBC # FLD: 20.3 % — HIGH (ref 10.3–14.5)
RBC # FLD: 20.3 % — HIGH (ref 10.3–14.5)
RBC # FLD: 21 % — HIGH (ref 10.3–14.5)
RBC # FLD: 21 % — HIGH (ref 10.3–14.5)
SODIUM SERPL-SCNC: 140 MMOL/L — SIGNIFICANT CHANGE UP (ref 135–145)
SODIUM SERPL-SCNC: 140 MMOL/L — SIGNIFICANT CHANGE UP (ref 135–145)
SODIUM SERPL-SCNC: 142 MMOL/L — SIGNIFICANT CHANGE UP (ref 135–145)
SODIUM SERPL-SCNC: 142 MMOL/L — SIGNIFICANT CHANGE UP (ref 135–145)
WBC # BLD: 12.65 K/UL — HIGH (ref 3.8–10.5)
WBC # BLD: 12.65 K/UL — HIGH (ref 3.8–10.5)
WBC # BLD: 13.43 K/UL — HIGH (ref 3.8–10.5)
WBC # BLD: 13.43 K/UL — HIGH (ref 3.8–10.5)
WBC # FLD AUTO: 12.65 K/UL — HIGH (ref 3.8–10.5)
WBC # FLD AUTO: 12.65 K/UL — HIGH (ref 3.8–10.5)
WBC # FLD AUTO: 13.43 K/UL — HIGH (ref 3.8–10.5)
WBC # FLD AUTO: 13.43 K/UL — HIGH (ref 3.8–10.5)

## 2024-01-05 PROCEDURE — 71045 X-RAY EXAM CHEST 1 VIEW: CPT | Mod: 26

## 2024-01-05 PROCEDURE — 93010 ELECTROCARDIOGRAM REPORT: CPT

## 2024-01-05 PROCEDURE — 99233 SBSQ HOSP IP/OBS HIGH 50: CPT

## 2024-01-05 PROCEDURE — 99231 SBSQ HOSP IP/OBS SF/LOW 25: CPT

## 2024-01-05 RX ORDER — ASPIRIN/CALCIUM CARB/MAGNESIUM 324 MG
81 TABLET ORAL DAILY
Refills: 0 | Status: DISCONTINUED | OUTPATIENT
Start: 2024-01-05 | End: 2024-01-05

## 2024-01-05 RX ORDER — HYDROMORPHONE HYDROCHLORIDE 2 MG/ML
0.2 INJECTION INTRAMUSCULAR; INTRAVENOUS; SUBCUTANEOUS ONCE
Refills: 0 | Status: DISCONTINUED | OUTPATIENT
Start: 2024-01-05 | End: 2024-01-05

## 2024-01-05 RX ORDER — PANTOPRAZOLE SODIUM 20 MG/1
40 TABLET, DELAYED RELEASE ORAL EVERY 12 HOURS
Refills: 0 | Status: DISCONTINUED | OUTPATIENT
Start: 2024-01-05 | End: 2024-02-22

## 2024-01-05 RX ORDER — CALCIUM GLUCONATE 100 MG/ML
1 VIAL (ML) INTRAVENOUS ONCE
Refills: 0 | Status: COMPLETED | OUTPATIENT
Start: 2024-01-05 | End: 2024-01-05

## 2024-01-05 RX ORDER — TICAGRELOR 90 MG/1
60 TABLET ORAL EVERY 12 HOURS
Refills: 0 | Status: DISCONTINUED | OUTPATIENT
Start: 2024-01-05 | End: 2024-01-13

## 2024-01-05 RX ORDER — ASPIRIN/CALCIUM CARB/MAGNESIUM 324 MG
81 TABLET ORAL DAILY
Refills: 0 | Status: DISCONTINUED | OUTPATIENT
Start: 2024-01-05 | End: 2024-01-13

## 2024-01-05 RX ADMIN — PANTOPRAZOLE SODIUM 40 MILLIGRAM(S): 20 TABLET, DELAYED RELEASE ORAL at 19:03

## 2024-01-05 RX ADMIN — MOMETASONE FUROATE 2 PUFF(S): 220 INHALANT RESPIRATORY (INHALATION) at 21:55

## 2024-01-05 RX ADMIN — HYDROMORPHONE HYDROCHLORIDE 0.2 MILLIGRAM(S): 2 INJECTION INTRAMUSCULAR; INTRAVENOUS; SUBCUTANEOUS at 01:58

## 2024-01-05 RX ADMIN — Medication 4: at 01:08

## 2024-01-05 RX ADMIN — Medication 3 MILLILITER(S): at 20:53

## 2024-01-05 RX ADMIN — HEPARIN SODIUM 5000 UNIT(S): 5000 INJECTION INTRAVENOUS; SUBCUTANEOUS at 05:37

## 2024-01-05 RX ADMIN — HEPARIN SODIUM 5000 UNIT(S): 5000 INJECTION INTRAVENOUS; SUBCUTANEOUS at 14:40

## 2024-01-05 RX ADMIN — Medication 100 GRAM(S): at 05:30

## 2024-01-05 RX ADMIN — Medication 5 MILLIGRAM(S): at 05:30

## 2024-01-05 RX ADMIN — LEVETIRACETAM 250 MILLIGRAM(S): 250 TABLET, FILM COATED ORAL at 19:05

## 2024-01-05 RX ADMIN — Medication 300 MILLIGRAM(S): at 12:13

## 2024-01-05 RX ADMIN — DEXTROSE MONOHYDRATE, SODIUM CHLORIDE, AND POTASSIUM CHLORIDE 75 MILLILITER(S): 50; .745; 4.5 INJECTION, SOLUTION INTRAVENOUS at 19:18

## 2024-01-05 RX ADMIN — DEXTROSE MONOHYDRATE, SODIUM CHLORIDE, AND POTASSIUM CHLORIDE 75 MILLILITER(S): 50; .745; 4.5 INJECTION, SOLUTION INTRAVENOUS at 12:13

## 2024-01-05 RX ADMIN — Medication 5 MILLIGRAM(S): at 23:57

## 2024-01-05 RX ADMIN — LEVETIRACETAM 250 MILLIGRAM(S): 250 TABLET, FILM COATED ORAL at 05:29

## 2024-01-05 RX ADMIN — DORNASE ALFA 2.5 MILLIGRAM(S): 1 SOLUTION RESPIRATORY (INHALATION) at 09:41

## 2024-01-05 RX ADMIN — HEPARIN SODIUM 5000 UNIT(S): 5000 INJECTION INTRAVENOUS; SUBCUTANEOUS at 21:54

## 2024-01-05 RX ADMIN — Medication 2: at 05:37

## 2024-01-05 RX ADMIN — HYDROMORPHONE HYDROCHLORIDE 0.2 MILLIGRAM(S): 2 INJECTION INTRAMUSCULAR; INTRAVENOUS; SUBCUTANEOUS at 01:43

## 2024-01-05 RX ADMIN — Medication 2: at 12:14

## 2024-01-05 RX ADMIN — INSULIN GLARGINE 14 UNIT(S): 100 INJECTION, SOLUTION SUBCUTANEOUS at 21:49

## 2024-01-05 RX ADMIN — Medication 5 MILLIGRAM(S): at 00:39

## 2024-01-05 RX ADMIN — Medication 5 MILLIGRAM(S): at 12:13

## 2024-01-05 RX ADMIN — Medication 3 MILLILITER(S): at 09:41

## 2024-01-05 RX ADMIN — Medication 5 MILLIGRAM(S): at 19:07

## 2024-01-05 NOTE — PROGRESS NOTE ADULT - ASSESSMENT
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     EKG SR RBBB (old per family     1) CAD s/p CABG  -p/w chest pain. EKG non ischemic , trop -sera   - echo with normal lv and moderate AS   - c/w metoprolol   - chest pains  Trop mildly elevated and trending down likley sec to kidney disease,         2) Covid +  - s/p remdesivir   - c/w supportive management   - t/t per primary team     3) Abd distension   -CTA AP obtained which showed  partially occlusive calcified and non-calcified plaque just distal to take-off of the SMA, with estimated at least 75% luminal narrowing. Limited evaluation of its distal branches. Patient taken emergently to OR on 12/24 s/p diagnostic laparoscopy and SMA stent placement with brachial cutdown  -Surgery/vascular on board , f/u recs  - HIDA scan + for acute asa, medical management as poor surgical candidate cont zosyn  - asa and Brilliant restarted      4) UGIB  -GI on board s/p  EGD 1/2/24 which revealed bleeding vessel (likely Dieulafoy) s/p clipping and NGT trauma s/p clipping, fundus not fully visualized 2/2 clot, minute Tushar III lesion in duodenum.   -on Protonix IV q 12

## 2024-01-05 NOTE — PROGRESS NOTE ADULT - SUBJECTIVE AND OBJECTIVE BOX
Interval Events:  - Vest PT for secretions    NEURO  RASS (if intubated): 		CAM ICU (if concern for delirium):  Exam: AOX3  Meds: acetaminophen   IVPB .. 1000 milliGRAM(s) IV Intermittent every 6 hours PRN Mild Pain (1 - 3), Moderate Pain (4 - 6)  aspirin Suppository 300 milliGRAM(s) Rectal daily  escitalopram 10 milliGRAM(s) Oral daily  gabapentin 100 milliGRAM(s) Oral two times a day  HYDROmorphone  Injectable 0.2 milliGRAM(s) IV Push once  levETIRAcetam   Injectable 250 milliGRAM(s) IV Push every 12 hours  memantine 5 milliGRAM(s) Oral two times a day      RESPIRATORY  RR: 18 (01-04-24 @ 22:00) (14 - 26)  SpO2: 94% (01-04-24 @ 22:00) (93% - 100%)  Wt(kg): --  Exam: nonlabored respirations  Mechanical Ventilation:     Meds: albuterol/ipratropium for Nebulization 3 milliLiter(s) Nebulizer every 12 hours  dornase cierra Solution 2.5 milliGRAM(s) Inhalation every 12 hours  mometasone 110 MICROgram(s) Inhaler 2 Puff(s) Inhalation every 12 hours      CARDIOVASCULAR  HR: 90 (01-04-24 @ 22:00) (80 - 94)  BP: 166/77 (01-04-24 @ 22:00) (128/100 - 173/75)  BP(mean): 103 (01-04-24 @ 22:00) (80 - 108)  ABP: --  ABP(mean): --  Wt(kg): --  CVP(cm H2O): --      Exam: pulse present  Meds: metoprolol tartrate Injectable 5 milliGRAM(s) IV Push every 6 hours  ranolazine 500 milliGRAM(s) Oral two times a day      GI/NUTRITION  Exam: soft, mild tenderness, mild distention  Diet: NPO  Meds: pantoprazole Infusion 8 mG/Hr IV Continuous <Continuous>  sucralfate 1 Gram(s) Oral every 12 hours      GENITOURINARY  I&O's Detail    01-03 @ 07:01  -  01-04 @ 07:00  --------------------------------------------------------  IN:    IV PiggyBack: 100 mL    Lactated Ringers: 300 mL    Pantoprazole: 240 mL    sodium chloride 0.9%: 1050 mL  Total IN: 1690 mL    OUT:    Stool (mL): 0 mL    Voided (mL): 775 mL  Total OUT: 775 mL    Total NET: 915 mL      01-04 @ 07:01  -  01-05 @ 01:02  --------------------------------------------------------  IN:    dextrose 5% + sodium chloride 0.45% w/ Additives: 900 mL    IV PiggyBack: 500 mL    Oral Fluid: 5 mL    Pantoprazole: 150 mL    sodium chloride 0.9%: 150 mL  Total IN: 1705 mL    OUT:    Stool (mL): 0 mL    Voided (mL): 700 mL  Total OUT: 700 mL    Total NET: 1005 mL          01-04    142  |  109<H>  |  30<H>  ----------------------------<  161<H>  3.6   |  19<L>  |  1.51<H>    Ca    7.8<L>      04 Jan 2024 00:40  Phos  2.6     01-04  Mg     2.00     01-04    TPro  5.9<L>  /  Alb  2.7<L>  /  TBili  0.7  /  DBili  x   /  AST  36  /  ALT  25  /  AlkPhos  57  01-04    Meds: dextrose 5% + sodium chloride 0.45% with potassium chloride 20 mEq/L 1000 milliLiter(s) IV Continuous <Continuous>      HEMATOLOGIC  Meds: heparin   Injectable 5000 Unit(s) SubCutaneous every 8 hours                          8.3    11.83 )-----------( 267      ( 04 Jan 2024 11:34 )             24.6     PT/INR - ( 03 Jan 2024 03:47 )   PT: 12.9 sec;   INR: 1.16 ratio         PTT - ( 03 Jan 2024 03:47 )  PTT:33.1 sec    INFECTIOUS DISEASES  T(C): 37.2 (01-04-24 @ 18:00), Max: 37.2 (01-04-24 @ 12:00)  Wt(kg): --  WBC Count: 11.83 K/uL (01-04 @ 11:34)    Recent Cultures:    Meds:     ENDOCRINE  Capillary Blood Glucose    Meds: dextrose 50% Injectable 25 Gram(s) IV Push once  dextrose 50% Injectable 12.5 Gram(s) IV Push once  insulin glargine Injectable (LANTUS) 14 Unit(s) SubCutaneous at bedtime  insulin lispro (ADMELOG) corrective regimen sliding scale   SubCutaneous every 6 hours      OTHER MEDICATIONS:      IMAGING: Interval Events:  - Vest PT for secretions  - difficulties swallowing, f/u w/ speech and swallow    NEURO  RASS (if intubated): 		CAM ICU (if concern for delirium):  Exam: AOX3  Meds: acetaminophen   IVPB .. 1000 milliGRAM(s) IV Intermittent every 6 hours PRN Mild Pain (1 - 3), Moderate Pain (4 - 6)  aspirin Suppository 300 milliGRAM(s) Rectal daily  escitalopram 10 milliGRAM(s) Oral daily  gabapentin 100 milliGRAM(s) Oral two times a day  HYDROmorphone  Injectable 0.2 milliGRAM(s) IV Push once  levETIRAcetam   Injectable 250 milliGRAM(s) IV Push every 12 hours  memantine 5 milliGRAM(s) Oral two times a day      RESPIRATORY  RR: 18 (01-04-24 @ 22:00) (14 - 26)  SpO2: 94% (01-04-24 @ 22:00) (93% - 100%)  Wt(kg): --  Exam: nonlabored respirations  Mechanical Ventilation:     Meds: albuterol/ipratropium for Nebulization 3 milliLiter(s) Nebulizer every 12 hours  dornase cierra Solution 2.5 milliGRAM(s) Inhalation every 12 hours  mometasone 110 MICROgram(s) Inhaler 2 Puff(s) Inhalation every 12 hours      CARDIOVASCULAR  HR: 90 (01-04-24 @ 22:00) (80 - 94)  BP: 166/77 (01-04-24 @ 22:00) (128/100 - 173/75)  BP(mean): 103 (01-04-24 @ 22:00) (80 - 108)  ABP: --  ABP(mean): --  Wt(kg): --  CVP(cm H2O): --      Exam: pulse present  Meds: metoprolol tartrate Injectable 5 milliGRAM(s) IV Push every 6 hours  ranolazine 500 milliGRAM(s) Oral two times a day      GI/NUTRITION  Exam: soft, mild tenderness, mild distention  Diet: NPO  Meds: pantoprazole Infusion 8 mG/Hr IV Continuous <Continuous>  sucralfate 1 Gram(s) Oral every 12 hours      GENITOURINARY  I&O's Detail    01-03 @ 07:01  -  01-04 @ 07:00  --------------------------------------------------------  IN:    IV PiggyBack: 100 mL    Lactated Ringers: 300 mL    Pantoprazole: 240 mL    sodium chloride 0.9%: 1050 mL  Total IN: 1690 mL    OUT:    Stool (mL): 0 mL    Voided (mL): 775 mL  Total OUT: 775 mL    Total NET: 915 mL      01-04 @ 07:01  -  01-05 @ 01:02  --------------------------------------------------------  IN:    dextrose 5% + sodium chloride 0.45% w/ Additives: 900 mL    IV PiggyBack: 500 mL    Oral Fluid: 5 mL    Pantoprazole: 150 mL    sodium chloride 0.9%: 150 mL  Total IN: 1705 mL    OUT:    Stool (mL): 0 mL    Voided (mL): 700 mL  Total OUT: 700 mL    Total NET: 1005 mL          01-04    142  |  109<H>  |  30<H>  ----------------------------<  161<H>  3.6   |  19<L>  |  1.51<H>    Ca    7.8<L>      04 Jan 2024 00:40  Phos  2.6     01-04  Mg     2.00     01-04    TPro  5.9<L>  /  Alb  2.7<L>  /  TBili  0.7  /  DBili  x   /  AST  36  /  ALT  25  /  AlkPhos  57  01-04    Meds: dextrose 5% + sodium chloride 0.45% with potassium chloride 20 mEq/L 1000 milliLiter(s) IV Continuous <Continuous>      HEMATOLOGIC  Meds: heparin   Injectable 5000 Unit(s) SubCutaneous every 8 hours                          8.3    11.83 )-----------( 267      ( 04 Jan 2024 11:34 )             24.6     PT/INR - ( 03 Jan 2024 03:47 )   PT: 12.9 sec;   INR: 1.16 ratio         PTT - ( 03 Jan 2024 03:47 )  PTT:33.1 sec    INFECTIOUS DISEASES  T(C): 37.2 (01-04-24 @ 18:00), Max: 37.2 (01-04-24 @ 12:00)  Wt(kg): --  WBC Count: 11.83 K/uL (01-04 @ 11:34)    Recent Cultures:    Meds:     ENDOCRINE  Capillary Blood Glucose    Meds: dextrose 50% Injectable 25 Gram(s) IV Push once  dextrose 50% Injectable 12.5 Gram(s) IV Push once  insulin glargine Injectable (LANTUS) 14 Unit(s) SubCutaneous at bedtime  insulin lispro (ADMELOG) corrective regimen sliding scale   SubCutaneous every 6 hours      OTHER MEDICATIONS:      IMAGING:

## 2024-01-05 NOTE — PROGRESS NOTE ADULT - SUBJECTIVE AND OBJECTIVE BOX
Aashish Boyer MD  Interventional Cardiology / Endovascular Specialist  [place] Office : [address] [place] [address] [place] [address]  Tel:   [place] Office : [address] [facility] [place] [address]  Tel: [phone]    Subjective/Overnight events: Patient lying in bed in SICU. No acute distress.   	  MEDICATIONS:  aspirin enteric coated 81 milliGRAM(s) Oral daily  heparin   Injectable 5000 Unit(s) SubCutaneous every 8 hours  metoprolol tartrate Injectable 5 milliGRAM(s) IV Push every 6 hours  ranolazine 500 milliGRAM(s) Oral two times a day  ticagrelor 60 milliGRAM(s) Enteral Tube every 12 hours      albuterol/ipratropium for Nebulization 3 milliLiter(s) Nebulizer every 12 hours  dornase cierra Solution 2.5 milliGRAM(s) Inhalation every 12 hours  mometasone 110 MICROgram(s) Inhaler 2 Puff(s) Inhalation every 12 hours    acetaminophen   IVPB .. 1000 milliGRAM(s) IV Intermittent every 6 hours PRN  escitalopram 10 milliGRAM(s) Oral daily  gabapentin 100 milliGRAM(s) Oral two times a day  levETIRAcetam   Injectable 250 milliGRAM(s) IV Push every 12 hours  memantine 5 milliGRAM(s) Oral two times a day    pantoprazole  Injectable 40 milliGRAM(s) IV Push every 12 hours  sucralfate 1 Gram(s) Oral every 12 hours    insulin glargine Injectable (LANTUS) 14 Unit(s) SubCutaneous at bedtime  insulin lispro (ADMELOG) corrective regimen sliding scale   SubCutaneous every 6 hours    dextrose 5% + sodium chloride 0.45% with potassium chloride 20 mEq/L 1000 milliLiter(s) IV Continuous <Continuous>      PAST MEDICAL/SURGICAL HISTORY  PAST MEDICAL & SURGICAL HISTORY:  Hyperlipemia      Hypertension      Coronary Artery Disease      Diabetes Mellitus Type II      Stented Coronary Artery  total 5 stents, last stent 5/2019      Neuropathy      Myocardial infarction      Stroke  mild left facial numbness   no other residuals verbalized      Myoclonic jerking      Stage 3 chronic kidney disease      History of Cataract Extraction      Hx of CABG      H/O coronary angiogram      S/P coronary artery stent placement  1/6/09      S/P placement of cardiac pacemaker          SOCIAL HISTORY: Substance Use (street drugs): ( x ) never used  (  ) other:    FAMILY HISTORY:  No pertinent family history in first degree relatives        REVIEW OF SYSTEMS:  CONSTITUTIONAL: No fever, weight loss, or fatigue  EYES: No eye pain, visual disturbances, or discharge  ENMT:  No difficulty hearing, tinnitus, vertigo; No sinus or throat pain  BREASTS: No pain, masses, or nipple discharge  GASTROINTESTINAL: No abdominal or epigastric pain. No nausea, vomiting, or hematemesis; No diarrhea or constipation. No melena or hematochezia.  GENITOURINARY: No dysuria, frequency, hematuria, or incontinence  NEUROLOGICAL: No headaches, memory loss, loss of strength, numbness, or tremors  ENDOCRINE: No heat or cold intolerance; No hair loss  MUSCULOSKELETAL: No joint pain or swelling; No muscle, back, or extremity pain  PSYCHIATRIC: No depression, anxiety, mood swings, or difficulty sleeping  HEME/LYMPH: No easy bruising, or bleeding gums  All others negative    PHYSICAL EXAM:  T(C): 37.4 (01-05-24 @ 12:00), Max: 37.4 (01-05-24 @ 12:00)  HR: 81 (01-05-24 @ 12:13) (80 - 92)  BP: 160/63 (01-05-24 @ 12:13) (148/68 - 183/79)  RR: 15 (01-05-24 @ 12:13) (15 - 26)  SpO2: 100% (01-05-24 @ 12:13) (92% - 100%)  Wt(kg): --  I&O's Summary    04 Jan 2024 07:01  -  05 Jan 2024 07:00  --------------------------------------------------------  IN: 2375 mL / OUT: 925 mL / NET: 1450 mL    05 Jan 2024 07:01  -  05 Jan 2024 15:32  --------------------------------------------------------  IN: 375 mL / OUT: 300 mL / NET: 75 mL      GENERAL: NAD  EYES:  conjunctiva and sclera clear  ENMT: No tonsillar erythema, exudates, or enlargement  Cardiovascular: Normal S1 S2, No JVD, No murmurs, No edema  Respiratory: Lungs clear to auscultation	  Gastrointestinal:  Soft,   Extremities: No edema                               9.0    13.43 )-----------( 313      ( 05 Jan 2024 01:00 )             27.5     01-05    140  |  111<H>  |  24<H>  ----------------------------<  199<H>  5.0   |  16<L>  |  1.41<H>    Ca    8.1<L>      05 Jan 2024 01:00  Phos  3.3     01-05  Mg     1.90     01-05    TPro  6.4  /  Alb  2.9<L>  /  TBili  0.7  /  DBili  x   /  AST  44<H>  /  ALT  25  /  AlkPhos  63  01-05    proBNP:   Lipid Profile:   HgA1c:   TSH:     Consultant(s) Notes Reviewed:  [x ] YES  [ ] NO    Care Discussed with Consultants/Other Providers [ x] YES  [ ] NO    Imaging Personally Reviewed independently:  [x] YES  [ ] NO    All labs, radiologic studies, vitals, orders and medications list reviewed. Patient is seen and examined at bedside. Case discussed with medical team.                 Aashish Boyer MD  Interventional Cardiology / Endovascular Specialist  [place] Office : [address] [place] [address] [place] [address]  Tel:   [place] Office : [address] [facility] [place] [address]  Tel: [phone]    Subjective/Overnight events: Patient lying in bed in SICU. No acute distress.   	  MEDICATIONS:  aspirin enteric coated 81 milliGRAM(s) Oral daily  heparin   Injectable 5000 Unit(s) SubCutaneous every 8 hours  metoprolol tartrate Injectable 5 milliGRAM(s) IV Push every 6 hours  ranolazine 500 milliGRAM(s) Oral two times a day  ticagrelor 60 milliGRAM(s) Enteral Tube every 12 hours      albuterol/ipratropium for Nebulization 3 milliLiter(s) Nebulizer every 12 hours  dornase cierra Solution 2.5 milliGRAM(s) Inhalation every 12 hours  mometasone 110 MICROgram(s) Inhaler 2 Puff(s) Inhalation every 12 hours    acetaminophen   IVPB .. 1000 milliGRAM(s) IV Intermittent every 6 hours PRN  escitalopram 10 milliGRAM(s) Oral daily  gabapentin 100 milliGRAM(s) Oral two times a day  levETIRAcetam   Injectable 250 milliGRAM(s) IV Push every 12 hours  memantine 5 milliGRAM(s) Oral two times a day    pantoprazole  Injectable 40 milliGRAM(s) IV Push every 12 hours  sucralfate 1 Gram(s) Oral every 12 hours    insulin glargine Injectable (LANTUS) 14 Unit(s) SubCutaneous at bedtime  insulin lispro (ADMELOG) corrective regimen sliding scale   SubCutaneous every 6 hours    dextrose 5% + sodium chloride 0.45% with potassium chloride 20 mEq/L 1000 milliLiter(s) IV Continuous <Continuous>      PAST MEDICAL/SURGICAL HISTORY  PAST MEDICAL & SURGICAL HISTORY:  Hyperlipemia      Hypertension      Coronary Artery Disease      Diabetes Mellitus Type II      Stented Coronary Artery  total 5 stents, last stent 5/2019      Neuropathy      Myocardial infarction      Stroke  mild left facial numbness   no other residuals verbalized      Myoclonic jerking      Stage 3 chronic kidney disease      History of Cataract Extraction      Hx of CABG      H/O coronary angiogram      S/P coronary artery stent placement  1/6/09      S/P placement of cardiac pacemaker          SOCIAL HISTORY: Substance Use (street drugs): ( x ) never used  (  ) other:    FAMILY HISTORY:  No pertinent family history in first degree relatives        REVIEW OF SYSTEMS:  CONSTITUTIONAL: No fever, weight loss, or fatigue  EYES: No eye pain, visual disturbances, or discharge  ENMT:  No difficulty hearing, tinnitus, vertigo; No sinus or throat pain  BREASTS: No pain, masses, or nipple discharge  GASTROINTESTINAL: No abdominal or epigastric pain. No nausea, vomiting, or hematemesis; No diarrhea or constipation. No melena or hematochezia.  GENITOURINARY: No dysuria, frequency, hematuria, or incontinence  NEUROLOGICAL: No headaches, memory loss, loss of strength, numbness, or tremors  ENDOCRINE: No heat or cold intolerance; No hair loss  MUSCULOSKELETAL: No joint pain or swelling; No muscle, back, or extremity pain  PSYCHIATRIC: No depression, anxiety, mood swings, or difficulty sleeping  HEME/LYMPH: No easy bruising, or bleeding gums  All others negative    PHYSICAL EXAM:  T(C): 37.4 (01-05-24 @ 12:00), Max: 37.4 (01-05-24 @ 12:00)  HR: 81 (01-05-24 @ 12:13) (80 - 92)  BP: 160/63 (01-05-24 @ 12:13) (148/68 - 183/79)  RR: 15 (01-05-24 @ 12:13) (15 - 26)  SpO2: 100% (01-05-24 @ 12:13) (92% - 100%)  Wt(kg): --  I&O's Summary    04 Jan 2024 07:01  -  05 Jan 2024 07:00  --------------------------------------------------------  IN: 2375 mL / OUT: 925 mL / NET: 1450 mL    05 Jan 2024 07:01  -  05 Jan 2024 15:32  --------------------------------------------------------  IN: 375 mL / OUT: 300 mL / NET: 75 mL      GENERAL: NAD  EYES:  conjunctiva and sclera clear  ENMT: No tonsillar erythema, exudates, or enlargement  Cardiovascular: Normal S1 S2, No JVD, No murmurs, No edema  Respiratory: Lungs clear to auscultation	  Gastrointestinal:  Soft,   Extremities: No edema                               9.0    13.43 )-----------( 313      ( 05 Jan 2024 01:00 )             27.5     01-05    140  |  111<H>  |  24<H>  ----------------------------<  199<H>  5.0   |  16<L>  |  1.41<H>    Ca    8.1<L>      05 Jan 2024 01:00  Phos  3.3     01-05  Mg     1.90     01-05    TPro  6.4  /  Alb  2.9<L>  /  TBili  0.7  /  DBili  x   /  AST  44<H>  /  ALT  25  /  AlkPhos  63  01-05    proBNP:   Lipid Profile:   HgA1c:   TSH:     Consultant(s) Notes Reviewed:  [x ] YES  [ ] NO    Care Discussed with Consultants/Other Providers [ x] YES  [ ] NO    Imaging Personally Reviewed independently:  [x] YES  [ ] NO    All labs, radiologic studies, vitals, orders and medications list reviewed. Patient is seen and examined at bedside. Case discussed with medical team.

## 2024-01-05 NOTE — PROGRESS NOTE ADULT - SUBJECTIVE AND OBJECTIVE BOX
NEPHROLOGY     Patient seen and examined with son at bedside, pt resting comfortably on 2L NC, in no acute distress.     MEDICATIONS  (STANDING):  albuterol/ipratropium for Nebulization 3 milliLiter(s) Nebulizer every 12 hours  aspirin Suppository 300 milliGRAM(s) Rectal daily  dextrose 5% + sodium chloride 0.45% with potassium chloride 20 mEq/L 1000 milliLiter(s) (75 mL/Hr) IV Continuous <Continuous>  dornase cierra Solution 2.5 milliGRAM(s) Inhalation every 12 hours  escitalopram 10 milliGRAM(s) Oral daily  gabapentin 100 milliGRAM(s) Oral two times a day  heparin   Injectable 5000 Unit(s) SubCutaneous every 8 hours  insulin glargine Injectable (LANTUS) 14 Unit(s) SubCutaneous at bedtime  insulin lispro (ADMELOG) corrective regimen sliding scale   SubCutaneous every 6 hours  levETIRAcetam   Injectable 250 milliGRAM(s) IV Push every 12 hours  memantine 5 milliGRAM(s) Oral two times a day  metoprolol tartrate Injectable 5 milliGRAM(s) IV Push every 6 hours  mometasone 110 MICROgram(s) Inhaler 2 Puff(s) Inhalation every 12 hours  pantoprazole  Injectable 40 milliGRAM(s) IV Push every 12 hours  ranolazine 500 milliGRAM(s) Oral two times a day  sucralfate 1 Gram(s) Oral every 12 hours    VITALS:  T(C): , Max: 37.2 (01-04-24 @ 12:00)  T(F): , Max: 99 (01-04-24 @ 12:00)  HR: 86 (01-05-24 @ 07:00)  BP: 175/81 (01-05-24 @ 07:00)  BP(mean): 107 (01-05-24 @ 07:00)  RR: 20 (01-05-24 @ 07:00)  SpO2: 98% (01-05-24 @ 07:00)    I and O's:    01-04 @ 07:01  -  01-05 @ 07:00  --------------------------------------------------------  IN: 2375 mL / OUT: 925 mL / NET: 1450 mL    PHYSICAL EXAM:  Constitutional: NAD.  Neck:  No JVD, supple   Respiratory: CTA B/L  Cardiovascular: S1 and S2, RRR  Gastrointestinal: soft,  distended   Extremities: tr peripheral edema, + peripheral pulses  Neurological: aox2  Psychiatric: Normal mood, normal affect  : no Moss  Skin: No rashes, C/D/I      LABS:                        9.0    13.43 )-----------( 313      ( 05 Jan 2024 01:00 )             27.5     01-05    140  |  111<H>  |  24<H>  ----------------------------<  199<H>  5.0   |  16<L>  |  1.41<H>    Ca    8.1<L>      05 Jan 2024 01:00  Phos  3.3     01-05  Mg     1.90     01-05    TPro  6.4  /  Alb  2.9<L>  /  TBili  0.7  /  DBili  x   /  AST  44<H>  /  ALT  25  /  AlkPhos  63  01-05    ASSESSMENT/PLAN:  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) who presents to the hospital for COVID19 infection and chest pain  MABLE   Hypophosphatemia - improving   Hypertensive urgency      1 Renal - Creatinine improving/stable, on D5-0.45NS + KCl 20mEq IVF  S/p CT with contrast   S/p calcium gluconate 1g iv x 1   2 CV - BP elevated, on Lopressor 5 mg IV q6h  3 Vasc - s/p SMA stent placement;   4 Sx - s/p HIDA + for acute asa, medical management, remains NPO   5 GI - s/p EGD clipping of bleeding duodenal ulcers - on protonix gtt       NEPHROLOGY     Patient seen and examined with son at bedside, pt resting comfortably on 2L NC, in no acute distress.     MEDICATIONS  (STANDING):  albuterol/ipratropium for Nebulization 3 milliLiter(s) Nebulizer every 12 hours  aspirin Suppository 300 milliGRAM(s) Rectal daily  dextrose 5% + sodium chloride 0.45% with potassium chloride 20 mEq/L 1000 milliLiter(s) (75 mL/Hr) IV Continuous <Continuous>  dornase cierra Solution 2.5 milliGRAM(s) Inhalation every 12 hours  escitalopram 10 milliGRAM(s) Oral daily  gabapentin 100 milliGRAM(s) Oral two times a day  heparin   Injectable 5000 Unit(s) SubCutaneous every 8 hours  insulin glargine Injectable (LANTUS) 14 Unit(s) SubCutaneous at bedtime  insulin lispro (ADMELOG) corrective regimen sliding scale   SubCutaneous every 6 hours  levETIRAcetam   Injectable 250 milliGRAM(s) IV Push every 12 hours  memantine 5 milliGRAM(s) Oral two times a day  metoprolol tartrate Injectable 5 milliGRAM(s) IV Push every 6 hours  mometasone 110 MICROgram(s) Inhaler 2 Puff(s) Inhalation every 12 hours  pantoprazole  Injectable 40 milliGRAM(s) IV Push every 12 hours  ranolazine 500 milliGRAM(s) Oral two times a day  sucralfate 1 Gram(s) Oral every 12 hours    VITALS:  T(C): , Max: 37.2 (01-04-24 @ 12:00)  T(F): , Max: 99 (01-04-24 @ 12:00)  HR: 86 (01-05-24 @ 07:00)  BP: 175/81 (01-05-24 @ 07:00)  BP(mean): 107 (01-05-24 @ 07:00)  RR: 20 (01-05-24 @ 07:00)  SpO2: 98% (01-05-24 @ 07:00)    I and O's:    01-04 @ 07:01  -  01-05 @ 07:00  --------------------------------------------------------  IN: 2375 mL / OUT: 925 mL / NET: 1450 mL    PHYSICAL EXAM:  Constitutional: NAD.  Neck:  No JVD, supple   Respiratory: CTA B/L  Cardiovascular: S1 and S2, RRR  Gastrointestinal: soft,  distended   Extremities: tr peripheral edema, + peripheral pulses  Neurological: aox2  Psychiatric: Normal mood, normal affect  : no Moss  Skin: No rashes, C/D/I      LABS:                        9.0    13.43 )-----------( 313      ( 05 Jan 2024 01:00 )             27.5     01-05    140  |  111<H>  |  24<H>  ----------------------------<  199<H>  5.0   |  16<L>  |  1.41<H>    Ca    8.1<L>      05 Jan 2024 01:00  Phos  3.3     01-05  Mg     1.90     01-05    TPro  6.4  /  Alb  2.9<L>  /  TBili  0.7  /  DBili  x   /  AST  44<H>  /  ALT  25  /  AlkPhos  63  01-05    ASSESSMENT/PLAN:  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 Mayra Cummins, [facility]  [phone]        NEPHROLOGY     Patient seen and examined with son at bedside, pt resting comfortably on 2L NC, in no acute distress.     MEDICATIONS  (STANDING):  albuterol/ipratropium for Nebulization 3 milliLiter(s) Nebulizer every 12 hours  aspirin Suppository 300 milliGRAM(s) Rectal daily  dextrose 5% + sodium chloride 0.45% with potassium chloride 20 mEq/L 1000 milliLiter(s) (75 mL/Hr) IV Continuous <Continuous>  dornase cierra Solution 2.5 milliGRAM(s) Inhalation every 12 hours  escitalopram 10 milliGRAM(s) Oral daily  gabapentin 100 milliGRAM(s) Oral two times a day  heparin   Injectable 5000 Unit(s) SubCutaneous every 8 hours  insulin glargine Injectable (LANTUS) 14 Unit(s) SubCutaneous at bedtime  insulin lispro (ADMELOG) corrective regimen sliding scale   SubCutaneous every 6 hours  levETIRAcetam   Injectable 250 milliGRAM(s) IV Push every 12 hours  memantine 5 milliGRAM(s) Oral two times a day  metoprolol tartrate Injectable 5 milliGRAM(s) IV Push every 6 hours  mometasone 110 MICROgram(s) Inhaler 2 Puff(s) Inhalation every 12 hours  pantoprazole  Injectable 40 milliGRAM(s) IV Push every 12 hours  ranolazine 500 milliGRAM(s) Oral two times a day  sucralfate 1 Gram(s) Oral every 12 hours    VITALS:  T(C): , Max: 37.2 (01-04-24 @ 12:00)  T(F): , Max: 99 (01-04-24 @ 12:00)  HR: 86 (01-05-24 @ 07:00)  BP: 175/81 (01-05-24 @ 07:00)  BP(mean): 107 (01-05-24 @ 07:00)  RR: 20 (01-05-24 @ 07:00)  SpO2: 98% (01-05-24 @ 07:00)    I and O's:    01-04 @ 07:01  -  01-05 @ 07:00  --------------------------------------------------------  IN: 2375 mL / OUT: 925 mL / NET: 1450 mL    PHYSICAL EXAM:  Constitutional: NAD.  Neck:  No JVD, supple   Respiratory: CTA B/L  Cardiovascular: S1 and S2, RRR  Gastrointestinal: soft,  distended   Extremities: tr peripheral edema, + peripheral pulses  Neurological: aox2  Psychiatric: Normal mood, normal affect  : no Moss  Skin: No rashes, C/D/I      LABS:                        9.0    13.43 )-----------( 313      ( 05 Jan 2024 01:00 )             27.5     01-05    140  |  111<H>  |  24<H>  ----------------------------<  199<H>  5.0   |  16<L>  |  1.41<H>    Ca    8.1<L>      05 Jan 2024 01:00  Phos  3.3     01-05  Mg     1.90     01-05    TPro  6.4  /  Alb  2.9<L>  /  TBili  0.7  /  DBili  x   /  AST  44<H>  /  ALT  25  /  AlkPhos  63  01-05    ASSESSMENT/PLAN:  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 Mayra Cummins, [facility]  [phone]        NEPHROLOGY     Patient seen and examined with son at bedside, pt resting comfortably on 2L NC, in no acute distress.     MEDICATIONS  (STANDING):  albuterol/ipratropium for Nebulization 3 milliLiter(s) Nebulizer every 12 hours  aspirin Suppository 300 milliGRAM(s) Rectal daily  dextrose 5% + sodium chloride 0.45% with potassium chloride 20 mEq/L 1000 milliLiter(s) (75 mL/Hr) IV Continuous <Continuous>  dornase cierra Solution 2.5 milliGRAM(s) Inhalation every 12 hours  escitalopram 10 milliGRAM(s) Oral daily  gabapentin 100 milliGRAM(s) Oral two times a day  heparin   Injectable 5000 Unit(s) SubCutaneous every 8 hours  insulin glargine Injectable (LANTUS) 14 Unit(s) SubCutaneous at bedtime  insulin lispro (ADMELOG) corrective regimen sliding scale   SubCutaneous every 6 hours  levETIRAcetam   Injectable 250 milliGRAM(s) IV Push every 12 hours  memantine 5 milliGRAM(s) Oral two times a day  metoprolol tartrate Injectable 5 milliGRAM(s) IV Push every 6 hours  mometasone 110 MICROgram(s) Inhaler 2 Puff(s) Inhalation every 12 hours  pantoprazole  Injectable 40 milliGRAM(s) IV Push every 12 hours  ranolazine 500 milliGRAM(s) Oral two times a day  sucralfate 1 Gram(s) Oral every 12 hours    VITALS:  T(C): , Max: 37.2 (01-04-24 @ 12:00)  T(F): , Max: 99 (01-04-24 @ 12:00)  HR: 86 (01-05-24 @ 07:00)  BP: 175/81 (01-05-24 @ 07:00)  BP(mean): 107 (01-05-24 @ 07:00)  RR: 20 (01-05-24 @ 07:00)  SpO2: 98% (01-05-24 @ 07:00)    I and O's:    01-04 @ 07:01  -  01-05 @ 07:00  --------------------------------------------------------  IN: 2375 mL / OUT: 925 mL / NET: 1450 mL    PHYSICAL EXAM:  Constitutional: NAD.  Neck:  No JVD, supple   Respiratory: CTA B/L  Cardiovascular: S1 and S2, RRR  Gastrointestinal: soft,  distended   Extremities: tr peripheral edema, + peripheral pulses  Neurological: aox2  Psychiatric: Normal mood, normal affect  : no Moss  Skin: No rashes, C/D/I      LABS:                        9.0    13.43 )-----------( 313      ( 05 Jan 2024 01:00 )             27.5     01-05    140  |  111<H>  |  24<H>  ----------------------------<  199<H>  5.0   |  16<L>  |  1.41<H>    Ca    8.1<L>      05 Jan 2024 01:00  Phos  3.3     01-05  Mg     1.90     01-05    TPro  6.4  /  Alb  2.9<L>  /  TBili  0.7  /  DBili  x   /  AST  44<H>  /  ALT  25  /  AlkPhos  63  01-05    ASSESSMENT/PLAN:  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 Mayra Cummins, [facility]  [phone]        NEPHROLOGY     Patient seen and examined with son at bedside, pt resting comfortably on 2L NC, in no acute distress.     MEDICATIONS  (STANDING):  albuterol/ipratropium for Nebulization 3 milliLiter(s) Nebulizer every 12 hours  aspirin Suppository 300 milliGRAM(s) Rectal daily  dextrose 5% + sodium chloride 0.45% with potassium chloride 20 mEq/L 1000 milliLiter(s) (75 mL/Hr) IV Continuous <Continuous>  dornase cierra Solution 2.5 milliGRAM(s) Inhalation every 12 hours  escitalopram 10 milliGRAM(s) Oral daily  gabapentin 100 milliGRAM(s) Oral two times a day  heparin   Injectable 5000 Unit(s) SubCutaneous every 8 hours  insulin glargine Injectable (LANTUS) 14 Unit(s) SubCutaneous at bedtime  insulin lispro (ADMELOG) corrective regimen sliding scale   SubCutaneous every 6 hours  levETIRAcetam   Injectable 250 milliGRAM(s) IV Push every 12 hours  memantine 5 milliGRAM(s) Oral two times a day  metoprolol tartrate Injectable 5 milliGRAM(s) IV Push every 6 hours  mometasone 110 MICROgram(s) Inhaler 2 Puff(s) Inhalation every 12 hours  pantoprazole  Injectable 40 milliGRAM(s) IV Push every 12 hours  ranolazine 500 milliGRAM(s) Oral two times a day  sucralfate 1 Gram(s) Oral every 12 hours    VITALS:  T(C): , Max: 37.2 (01-04-24 @ 12:00)  T(F): , Max: 99 (01-04-24 @ 12:00)  HR: 86 (01-05-24 @ 07:00)  BP: 175/81 (01-05-24 @ 07:00)  BP(mean): 107 (01-05-24 @ 07:00)  RR: 20 (01-05-24 @ 07:00)  SpO2: 98% (01-05-24 @ 07:00)    I and O's:    01-04 @ 07:01  -  01-05 @ 07:00  --------------------------------------------------------  IN: 2375 mL / OUT: 925 mL / NET: 1450 mL    PHYSICAL EXAM:  Constitutional: NAD.  Neck:  No JVD, supple   Respiratory: CTA B/L  Cardiovascular: S1 and S2, RRR  Gastrointestinal: soft,  distended   Extremities: tr peripheral edema, + peripheral pulses  Neurological: aox2  Psychiatric: Normal mood, normal affect  : no Moss  Skin: No rashes, C/D/I      LABS:                        9.0    13.43 )-----------( 313      ( 05 Jan 2024 01:00 )             27.5     01-05    140  |  111<H>  |  24<H>  ----------------------------<  199<H>  5.0   |  16<L>  |  1.41<H>    Ca    8.1<L>      05 Jan 2024 01:00  Phos  3.3     01-05  Mg     1.90     01-05    TPro  6.4  /  Alb  2.9<L>  /  TBili  0.7  /  DBili  x   /  AST  44<H>  /  ALT  25  /  AlkPhos  63  01-05    ASSESSMENT/PLAN:  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 Mayra Cummins, [facility]  [phone]

## 2024-01-05 NOTE — PROGRESS NOTE ADULT - ASSESSMENT
Renal Attending   Physical Exam:  Lungs: diminished at bases  Heart: Normal S1,S2  Abdomen: soft ,non tender  Extremities: no edema  Patient seen and examined .chart and NP note reviewed .I agreed with plan ans assessment and plan of care as written.      hold IVF one not NPO.    Elsamayela Nunez  [facility]  Office: [phone]     Renal Attending   Physical Exam:  Lungs: diminished at bases  Heart: Normal S1,S2  Abdomen: soft ,non tender  Extremities: no edema  Patient seen and examined .chart and NP note reviewed .I agreed with plan ans assessment and plan of care as written.      hold IVF one not NPO.    Elsamayela Nunez  [facility]  Office: [phone]

## 2024-01-05 NOTE — PROGRESS NOTE ADULT - ATTENDING COMMENTS
I agree with the detailed interval history, physical, and plan, which I have reviewed and edited where appropriate'; also agree with notes/assessment with my team on service.  I have personally examined the patient.  I was physically present for the key portions of the evaluation and management (E/M) service provided.  I reviewed all the pertinent data.  The patient is a critical care patient with life threatening hemodynamic and metabolic instability in SICU.  The SICU team has a constant risk benefit analyzes discussion and coordinating care with the primary team and all consultants.   The patient is in SICU with the chief complaint and diagnosis mentioned in the note.   The plan will be specified in the note.  89M with history of CAD s/p CABG s/p stents; s/p diagnostic laparoscopy and SMA stent placement with brachial cutdown in SICU for HD monitoring  complicated by UGIB with melena.  Awake  Lung clear  Heart   PLAN:   Neurologic:  - Keppra  -Tylenol  Respiratory:  -Maintain O2 saturation >92%  Cardiovascular:   -Metoprolol  -POCUS  Gastrointestinal/Nutrition:  -Diet: NPO  Genitourinary/Renal:  - Monitor-Uo  Hematologic:  -FU CBC  -SQH   Infectious Disease:  -Monitor fever curve   Endocrine:  -Jeremiah HAYS 14U  -Monitor glucose     Disposition: dc floor

## 2024-01-05 NOTE — CHART NOTE - NSCHARTNOTEFT_GEN_A_CORE
Patient is medically stable and no longer requires critical care needs per SICU team and SICU attending. Patient going to 322A on 3N. Patient signed out to vascular team Resident. All questions answered.     SICU [id number] Patient is medically stable and no longer requires critical care needs per SICU team and SICU attending. Patient going to 322A on 3N. Patient signed out to vascular team Resident. All questions answered.     SICU [id number]

## 2024-01-05 NOTE — PROGRESS NOTE ADULT - ASSESSMENT
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 CTA demonstrating mesenteric fat stranding associated with ascending/transverse colon. S/p SMA angiography with stent placement with diagnostic laparoscopy 12/24, small bowel and visible colon viable, some inflammation of omentum in RUQ. Course c/b GI bleed, s/p scope on 1/2 with GI with clips placed.     Plan:   - Diet: NPo 2/2 dysphagia  - consider repeat SLP vs cinesphogram  - Pain control PRN  - protonix infusion x72 hrs post scope, then protonix bid   - continue keppra  - acute cholecystitis s/p IV abx, tolerating abx.  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 CTA demonstrating mesenteric fat stranding associated with ascending/transverse colon. S/p SMA angiography with stent placement with diagnostic laparoscopy 12/24, small bowel and visible colon viable, some inflammation of omentum in RUQ. Course c/b GI bleed, s/p scope on 1/2 with GI with clips placed.     Plan:   - Diet: NPo 2/2 dysphagia  - consider repeat SLP vs cinesphogram  - Pain control PRN  - protonix infusion x72 hrs post scope, then protonix bid   - continue keppra  - acute cholecystitis s/p IV abx, tolerating abx.  - ASA resumed  - please resume Brillinta  - Appreciate excellent care per SICU   - tx to medicine when out of sicu    C Team Surgery   [id number]

## 2024-01-05 NOTE — PROGRESS NOTE ADULT - SUBJECTIVE AND OBJECTIVE BOX
Vascular Surgery Daily Progress Note  =====================================================    SUBJECTIVE: Patient seen and examined at bedside on AM rounds.    --------------------------------------------------------------------------------------    MEDICATIONS:    Neurologic Medications  acetaminophen   IVPB .. 1000 milliGRAM(s) IV Intermittent every 6 hours PRN Mild Pain (1 - 3), Moderate Pain (4 - 6)  aspirin Suppository 300 milliGRAM(s) Rectal daily  escitalopram 10 milliGRAM(s) Oral daily  gabapentin 100 milliGRAM(s) Oral two times a day  levETIRAcetam   Injectable 250 milliGRAM(s) IV Push every 12 hours  memantine 5 milliGRAM(s) Oral two times a day    Respiratory Medications  albuterol/ipratropium for Nebulization 3 milliLiter(s) Nebulizer every 12 hours  dornase cierra Solution 2.5 milliGRAM(s) Inhalation every 12 hours  mometasone 110 MICROgram(s) Inhaler 2 Puff(s) Inhalation every 12 hours    Cardiovascular Medications  metoprolol tartrate Injectable 5 milliGRAM(s) IV Push every 6 hours  ranolazine 500 milliGRAM(s) Oral two times a day    Gastrointestinal Medications  dextrose 5% + sodium chloride 0.45% with potassium chloride 20 mEq/L 1000 milliLiter(s) IV Continuous <Continuous>  pantoprazole  Injectable 40 milliGRAM(s) IV Push every 12 hours  sucralfate 1 Gram(s) Oral every 12 hours    Genitourinary Medications    Hematologic/Oncologic Medications  heparin   Injectable 5000 Unit(s) SubCutaneous every 8 hours    Antimicrobial/Immunologic Medications    Endocrine/Metabolic Medications  insulin glargine Injectable (LANTUS) 14 Unit(s) SubCutaneous at bedtime  insulin lispro (ADMELOG) corrective regimen sliding scale   SubCutaneous every 6 hours    Topical/Other Medications    --------------------------------------------------------------------------------------    VITAL SIGNS:  T(C): 36.4 (01-05-24 @ 04:00), Max: 37.2 (01-04-24 @ 12:00)  HR: 86 (01-05-24 @ 07:00) (80 - 94)  BP: 175/81 (01-05-24 @ 07:00) (128/100 - 175/81)  RR: 20 (01-05-24 @ 07:00) (14 - 26)  SpO2: 98% (01-05-24 @ 07:00) (92% - 100%)  --------------------------------------------------------------------------------------    EXAM    General: NAD, resting in bed comfortably.  Cardiac: regular rate, warm and well perfused  Respiratory: Nonlabored respirations, normal cw expansion. secretions  Abdomen: soft, nontender, nondistended.   Extremities: normal strength, no swelling    --------------------------------------------------------------------------------------

## 2024-01-05 NOTE — PROGRESS NOTE ADULT - ASSESSMENT
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 Surgery consulted on 12/24 for abdominal pain and distension. CTA AP obtained which showed  partially occlusive calcified and non-calcified plaque just distal to take-off of the SMA, with estimated at least 75% luminal narrowing. Limited evaluation of its distal branches. Patient taken emergently to OR on 12/24 with general surgery and vascular surgery. Patient s/p diagnostic laparoscopy and SMA stent placement with brachial cutdown. SICU consulted postoperatively for HD monitoring. Course complicated by UGIB requiring 3u pRBCs on 1/1/24. Plan for EGD with GI on 1/2/24.    PLAN:   Neurologic:  -A&Ox2 (baseline per family)   -Continue Keppra- hx of myoclonic jerks   -Pain: Tylenol  -holding Lexapro/ Memantine    Respiratory:  -Maintain O2 saturation >92%  -Duonebs, Pulmozyme, Flovent     Cardiovascular: htn  -MAP goal >65  -Metoprolol 5q6  -Rectal ASA    Gastrointestinal/Nutrition:  -Diet: NPO 2/2 dysphagia   -RUQ US 12/26-> GB distended with cholelithiasis, equivocal   -Trial abx for cholecystitis, no plan for percutaneous cholecystostomy at this time  -PPI gtt for 72 hours   -S/p EGD with clipping of bleeding gastric ulcer on 1/2/24  -Mesenteric duplex 1/3 with no acute findings  -Sucralfate for GI ppx  -Monitor rectal pouch output    Genitourinary/Renal:  - Hx of CKD (Baseline Cr 1.2-1.3)   - Monitor strict I/Os   - D5 1/2NS +20K @75    Hematologic:  -DVT ppx: SQH   -Rectal ASA    Infectious Disease:  -Monitor WBC   -Monitor fever curve     Endocrine:  -T2DM   -MISS, Lantus 14 qhs while NPO   -Monitor blood glucose     Disposition: SICU 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 Surgery consulted on 12/24 for abdominal pain and distension. CTA AP obtained which showed  partially occlusive calcified and non-calcified plaque just distal to take-off of the SMA, with estimated at least 75% luminal narrowing. Limited evaluation of its distal branches. Patient taken emergently to OR on 12/24 with general surgery and vascular surgery. Patient s/p diagnostic laparoscopy and SMA stent placement with brachial cutdown. SICU consulted postoperatively for HD monitoring. Course complicated by UGIB requiring 3u pRBCs on 1/1/24. Plan for EGD with GI on 1/2/24.    PLAN:   Neurologic:  -A&Ox2 (baseline per family)   -Continue Keppra- hx of myoclonic jerks   -Pain: Tylenol  -holding Lexapro/ Memantine/ Gabapentin    Respiratory:  -Maintain O2 saturation >92%  -Duonebs, Pulmozyme, Flovent     Cardiovascular: htn  -MAP goal >65  -Metoprolol 5q6  -Rectal ASA    Gastrointestinal/Nutrition:  -Diet: NPO 2/2 dysphagia   -RUQ US 12/26-> GB distended with cholelithiasis, equivocal   -Trial abx for cholecystitis, no plan for percutaneous cholecystostomy at this time  -Had PPI gtt for 72 hours after procedure, now on protonix BID   -S/p EGD with clipping of bleeding gastric ulcer on 1/2/24  -Mesenteric duplex 1/3 with no acute findings  -Sucralfate for GI ppx  -Monitor rectal pouch output    Genitourinary/Renal:  - Hx of CKD (Baseline Cr 1.2-1.3)   - Monitor strict I/Os   - D5 1/2NS +20K @75    Hematologic:  -DVT ppx: SQH   -Rectal ASA    Infectious Disease:  -Monitor WBC   -Monitor fever curve     Endocrine:  -T2DM   -MISS, Lantus 14 qhs while NPO   -Monitor blood glucose     Disposition: Listed

## 2024-01-06 DIAGNOSIS — K92.2 GASTROINTESTINAL HEMORRHAGE, UNSPECIFIED: ICD-10-CM

## 2024-01-06 DIAGNOSIS — R14.0 ABDOMINAL DISTENSION (GASEOUS): ICD-10-CM

## 2024-01-06 DIAGNOSIS — R10.9 UNSPECIFIED ABDOMINAL PAIN: ICD-10-CM

## 2024-01-06 DIAGNOSIS — D62 ACUTE POSTHEMORRHAGIC ANEMIA: ICD-10-CM

## 2024-01-06 DIAGNOSIS — R13.10 DYSPHAGIA, UNSPECIFIED: ICD-10-CM

## 2024-01-06 LAB
ALBUMIN SERPL ELPH-MCNC: 2.8 G/DL — LOW (ref 3.3–5)
ALBUMIN SERPL ELPH-MCNC: 2.8 G/DL — LOW (ref 3.3–5)
ALP SERPL-CCNC: 62 U/L — SIGNIFICANT CHANGE UP (ref 40–120)
ALP SERPL-CCNC: 62 U/L — SIGNIFICANT CHANGE UP (ref 40–120)
ALT FLD-CCNC: 19 U/L — SIGNIFICANT CHANGE UP (ref 4–41)
ALT FLD-CCNC: 19 U/L — SIGNIFICANT CHANGE UP (ref 4–41)
ANION GAP SERPL CALC-SCNC: 12 MMOL/L — SIGNIFICANT CHANGE UP (ref 7–14)
ANION GAP SERPL CALC-SCNC: 12 MMOL/L — SIGNIFICANT CHANGE UP (ref 7–14)
AST SERPL-CCNC: 33 U/L — SIGNIFICANT CHANGE UP (ref 4–40)
AST SERPL-CCNC: 33 U/L — SIGNIFICANT CHANGE UP (ref 4–40)
BILIRUB SERPL-MCNC: 0.6 MG/DL — SIGNIFICANT CHANGE UP (ref 0.2–1.2)
BILIRUB SERPL-MCNC: 0.6 MG/DL — SIGNIFICANT CHANGE UP (ref 0.2–1.2)
BUN SERPL-MCNC: 20 MG/DL — SIGNIFICANT CHANGE UP (ref 7–23)
BUN SERPL-MCNC: 20 MG/DL — SIGNIFICANT CHANGE UP (ref 7–23)
CALCIUM SERPL-MCNC: 8.2 MG/DL — LOW (ref 8.4–10.5)
CALCIUM SERPL-MCNC: 8.2 MG/DL — LOW (ref 8.4–10.5)
CHLORIDE SERPL-SCNC: 111 MMOL/L — HIGH (ref 98–107)
CHLORIDE SERPL-SCNC: 111 MMOL/L — HIGH (ref 98–107)
CO2 SERPL-SCNC: 20 MMOL/L — LOW (ref 22–31)
CO2 SERPL-SCNC: 20 MMOL/L — LOW (ref 22–31)
CREAT SERPL-MCNC: 1.49 MG/DL — HIGH (ref 0.5–1.3)
CREAT SERPL-MCNC: 1.49 MG/DL — HIGH (ref 0.5–1.3)
EGFR: 44 ML/MIN/1.73M2 — LOW
EGFR: 44 ML/MIN/1.73M2 — LOW
GLUCOSE SERPL-MCNC: 195 MG/DL — HIGH (ref 70–99)
GLUCOSE SERPL-MCNC: 195 MG/DL — HIGH (ref 70–99)
HCT VFR BLD CALC: 27.7 % — LOW (ref 39–50)
HCT VFR BLD CALC: 27.7 % — LOW (ref 39–50)
HGB BLD-MCNC: 9 G/DL — LOW (ref 13–17)
HGB BLD-MCNC: 9 G/DL — LOW (ref 13–17)
MAGNESIUM SERPL-MCNC: 1.9 MG/DL — SIGNIFICANT CHANGE UP (ref 1.6–2.6)
MAGNESIUM SERPL-MCNC: 1.9 MG/DL — SIGNIFICANT CHANGE UP (ref 1.6–2.6)
MCHC RBC-ENTMCNC: 30.2 PG — SIGNIFICANT CHANGE UP (ref 27–34)
MCHC RBC-ENTMCNC: 30.2 PG — SIGNIFICANT CHANGE UP (ref 27–34)
MCHC RBC-ENTMCNC: 32.5 GM/DL — SIGNIFICANT CHANGE UP (ref 32–36)
MCHC RBC-ENTMCNC: 32.5 GM/DL — SIGNIFICANT CHANGE UP (ref 32–36)
MCV RBC AUTO: 93 FL — SIGNIFICANT CHANGE UP (ref 80–100)
MCV RBC AUTO: 93 FL — SIGNIFICANT CHANGE UP (ref 80–100)
NRBC # BLD: 0 /100 WBCS — SIGNIFICANT CHANGE UP (ref 0–0)
NRBC # BLD: 0 /100 WBCS — SIGNIFICANT CHANGE UP (ref 0–0)
NRBC # FLD: 0.03 K/UL — HIGH (ref 0–0)
NRBC # FLD: 0.03 K/UL — HIGH (ref 0–0)
PHOSPHATE SERPL-MCNC: 2.7 MG/DL — SIGNIFICANT CHANGE UP (ref 2.5–4.5)
PHOSPHATE SERPL-MCNC: 2.7 MG/DL — SIGNIFICANT CHANGE UP (ref 2.5–4.5)
PLATELET # BLD AUTO: 301 K/UL — SIGNIFICANT CHANGE UP (ref 150–400)
PLATELET # BLD AUTO: 301 K/UL — SIGNIFICANT CHANGE UP (ref 150–400)
POTASSIUM SERPL-MCNC: 5 MMOL/L — SIGNIFICANT CHANGE UP (ref 3.5–5.3)
POTASSIUM SERPL-MCNC: 5 MMOL/L — SIGNIFICANT CHANGE UP (ref 3.5–5.3)
POTASSIUM SERPL-SCNC: 5 MMOL/L — SIGNIFICANT CHANGE UP (ref 3.5–5.3)
POTASSIUM SERPL-SCNC: 5 MMOL/L — SIGNIFICANT CHANGE UP (ref 3.5–5.3)
PROT SERPL-MCNC: 6.2 G/DL — SIGNIFICANT CHANGE UP (ref 6–8.3)
PROT SERPL-MCNC: 6.2 G/DL — SIGNIFICANT CHANGE UP (ref 6–8.3)
RBC # BLD: 2.98 M/UL — LOW (ref 4.2–5.8)
RBC # BLD: 2.98 M/UL — LOW (ref 4.2–5.8)
RBC # FLD: 20.8 % — HIGH (ref 10.3–14.5)
RBC # FLD: 20.8 % — HIGH (ref 10.3–14.5)
SODIUM SERPL-SCNC: 143 MMOL/L — SIGNIFICANT CHANGE UP (ref 135–145)
SODIUM SERPL-SCNC: 143 MMOL/L — SIGNIFICANT CHANGE UP (ref 135–145)
WBC # BLD: 10.27 K/UL — SIGNIFICANT CHANGE UP (ref 3.8–10.5)
WBC # BLD: 10.27 K/UL — SIGNIFICANT CHANGE UP (ref 3.8–10.5)
WBC # FLD AUTO: 10.27 K/UL — SIGNIFICANT CHANGE UP (ref 3.8–10.5)
WBC # FLD AUTO: 10.27 K/UL — SIGNIFICANT CHANGE UP (ref 3.8–10.5)

## 2024-01-06 PROCEDURE — 71045 X-RAY EXAM CHEST 1 VIEW: CPT | Mod: 26

## 2024-01-06 RX ORDER — GABAPENTIN 400 MG/1
100 CAPSULE ORAL
Refills: 0 | Status: DISCONTINUED | OUTPATIENT
Start: 2024-01-06 | End: 2024-01-19

## 2024-01-06 RX ORDER — AMLODIPINE BESYLATE 2.5 MG/1
5 TABLET ORAL DAILY
Refills: 0 | Status: DISCONTINUED | OUTPATIENT
Start: 2024-01-06 | End: 2024-01-06

## 2024-01-06 RX ORDER — AMLODIPINE BESYLATE 2.5 MG/1
5 TABLET ORAL DAILY
Refills: 0 | Status: DISCONTINUED | OUTPATIENT
Start: 2024-01-06 | End: 2024-01-24

## 2024-01-06 RX ORDER — MAGNESIUM SULFATE 500 MG/ML
2 VIAL (ML) INJECTION ONCE
Refills: 0 | Status: COMPLETED | OUTPATIENT
Start: 2024-01-06 | End: 2024-01-06

## 2024-01-06 RX ORDER — DEXTROSE MONOHYDRATE, SODIUM CHLORIDE, AND POTASSIUM CHLORIDE 50; .745; 4.5 G/1000ML; G/1000ML; G/1000ML
1000 INJECTION, SOLUTION INTRAVENOUS
Refills: 0 | Status: DISCONTINUED | OUTPATIENT
Start: 2024-01-06 | End: 2024-01-07

## 2024-01-06 RX ORDER — SODIUM,POTASSIUM PHOSPHATES 278-250MG
1 POWDER IN PACKET (EA) ORAL
Refills: 0 | Status: COMPLETED | OUTPATIENT
Start: 2024-01-06 | End: 2024-01-07

## 2024-01-06 RX ADMIN — Medication 2: at 06:12

## 2024-01-06 RX ADMIN — Medication 4: at 18:07

## 2024-01-06 RX ADMIN — MOMETASONE FUROATE 2 PUFF(S): 220 INHALANT RESPIRATORY (INHALATION) at 11:17

## 2024-01-06 RX ADMIN — Medication 81 MILLIGRAM(S): at 15:48

## 2024-01-06 RX ADMIN — GABAPENTIN 100 MILLIGRAM(S): 400 CAPSULE ORAL at 15:46

## 2024-01-06 RX ADMIN — RANOLAZINE 500 MILLIGRAM(S): 500 TABLET, FILM COATED, EXTENDED RELEASE ORAL at 23:03

## 2024-01-06 RX ADMIN — DEXTROSE MONOHYDRATE, SODIUM CHLORIDE, AND POTASSIUM CHLORIDE 75 MILLILITER(S): 50; .745; 4.5 INJECTION, SOLUTION INTRAVENOUS at 23:03

## 2024-01-06 RX ADMIN — PANTOPRAZOLE SODIUM 40 MILLIGRAM(S): 20 TABLET, DELAYED RELEASE ORAL at 05:22

## 2024-01-06 RX ADMIN — DEXTROSE MONOHYDRATE, SODIUM CHLORIDE, AND POTASSIUM CHLORIDE 75 MILLILITER(S): 50; .745; 4.5 INJECTION, SOLUTION INTRAVENOUS at 01:00

## 2024-01-06 RX ADMIN — Medication 4: at 01:00

## 2024-01-06 RX ADMIN — Medication 1 GRAM(S): at 18:42

## 2024-01-06 RX ADMIN — Medication 4: at 12:48

## 2024-01-06 RX ADMIN — Medication 5 MILLIGRAM(S): at 13:38

## 2024-01-06 RX ADMIN — RANOLAZINE 500 MILLIGRAM(S): 500 TABLET, FILM COATED, EXTENDED RELEASE ORAL at 15:45

## 2024-01-06 RX ADMIN — MOMETASONE FUROATE 2 PUFF(S): 220 INHALANT RESPIRATORY (INHALATION) at 23:04

## 2024-01-06 RX ADMIN — Medication 5 MILLIGRAM(S): at 18:58

## 2024-01-06 RX ADMIN — HEPARIN SODIUM 5000 UNIT(S): 5000 INJECTION INTRAVENOUS; SUBCUTANEOUS at 23:03

## 2024-01-06 RX ADMIN — PANTOPRAZOLE SODIUM 40 MILLIGRAM(S): 20 TABLET, DELAYED RELEASE ORAL at 19:04

## 2024-01-06 RX ADMIN — MEMANTINE HYDROCHLORIDE 5 MILLIGRAM(S): 10 TABLET ORAL at 23:03

## 2024-01-06 RX ADMIN — HEPARIN SODIUM 5000 UNIT(S): 5000 INJECTION INTRAVENOUS; SUBCUTANEOUS at 05:42

## 2024-01-06 RX ADMIN — MEMANTINE HYDROCHLORIDE 5 MILLIGRAM(S): 10 TABLET ORAL at 15:47

## 2024-01-06 RX ADMIN — Medication 5 MILLIGRAM(S): at 05:23

## 2024-01-06 RX ADMIN — Medication 25 GRAM(S): at 10:04

## 2024-01-06 RX ADMIN — Medication 3 MILLILITER(S): at 20:16

## 2024-01-06 RX ADMIN — ESCITALOPRAM OXALATE 10 MILLIGRAM(S): 10 TABLET, FILM COATED ORAL at 15:48

## 2024-01-06 RX ADMIN — Medication 1 PACKET(S): at 18:51

## 2024-01-06 RX ADMIN — LEVETIRACETAM 250 MILLIGRAM(S): 250 TABLET, FILM COATED ORAL at 19:02

## 2024-01-06 RX ADMIN — LEVETIRACETAM 250 MILLIGRAM(S): 250 TABLET, FILM COATED ORAL at 05:23

## 2024-01-06 RX ADMIN — AMLODIPINE BESYLATE 5 MILLIGRAM(S): 2.5 TABLET ORAL at 19:22

## 2024-01-06 RX ADMIN — DORNASE ALFA 2.5 MILLIGRAM(S): 1 SOLUTION RESPIRATORY (INHALATION) at 06:45

## 2024-01-06 RX ADMIN — HEPARIN SODIUM 5000 UNIT(S): 5000 INJECTION INTRAVENOUS; SUBCUTANEOUS at 15:58

## 2024-01-06 RX ADMIN — Medication 3 MILLILITER(S): at 06:40

## 2024-01-06 RX ADMIN — DEXTROSE MONOHYDRATE, SODIUM CHLORIDE, AND POTASSIUM CHLORIDE 75 MILLILITER(S): 50; .745; 4.5 INJECTION, SOLUTION INTRAVENOUS at 09:01

## 2024-01-06 RX ADMIN — TICAGRELOR 60 MILLIGRAM(S): 90 TABLET ORAL at 18:41

## 2024-01-06 NOTE — PROGRESS NOTE ADULT - SUBJECTIVE AND OBJECTIVE BOX
Aashish Boyer MD  Interventional Cardiology / Endovascular Specialist  [place] Office : [address] [place] [address] Tel:   [place] Office : [address] [facility] [address]  Tel: [phone]      Subjective/Overnight events: Patient lying in bed c/o of SOB  	  MEDICATIONS:  aspirin  chewable 81 milliGRAM(s) Oral daily  heparin   Injectable 5000 Unit(s) SubCutaneous every 8 hours  metoprolol tartrate Injectable 5 milliGRAM(s) IV Push every 6 hours  ranolazine 500 milliGRAM(s) Oral two times a day  ticagrelor 60 milliGRAM(s) Enteral Tube every 12 hours      albuterol/ipratropium for Nebulization 3 milliLiter(s) Nebulizer every 12 hours  dornase cierra Solution 2.5 milliGRAM(s) Inhalation every 12 hours  mometasone 110 MICROgram(s) Inhaler 2 Puff(s) Inhalation every 12 hours    acetaminophen   IVPB .. 1000 milliGRAM(s) IV Intermittent every 6 hours PRN  escitalopram 10 milliGRAM(s) Oral daily  gabapentin 100 milliGRAM(s) Oral two times a day  levETIRAcetam   Injectable 250 milliGRAM(s) IV Push every 12 hours  memantine 5 milliGRAM(s) Oral two times a day    pantoprazole  Injectable 40 milliGRAM(s) IV Push every 12 hours  sucralfate 1 Gram(s) Oral every 12 hours    insulin glargine Injectable (LANTUS) 14 Unit(s) SubCutaneous at bedtime  insulin lispro (ADMELOG) corrective regimen sliding scale   SubCutaneous every 6 hours    dextrose 5% + sodium chloride 0.45% with potassium chloride 20 mEq/L 1000 milliLiter(s) IV Continuous <Continuous>  potassium phosphate / sodium phosphate Powder (PHOS-NaK) 1 Packet(s) Oral two times a day      PAST MEDICAL/SURGICAL HISTORY  PAST MEDICAL & SURGICAL HISTORY:  Hyperlipemia      Hypertension      Coronary Artery Disease      Diabetes Mellitus Type II      Stented Coronary Artery  total 5 stents, last stent 5/2019      Neuropathy      Myocardial infarction      Stroke  mild left facial numbness   no other residuals verbalized      Myoclonic jerking      Stage 3 chronic kidney disease      History of Cataract Extraction      Hx of CABG      H/O coronary angiogram      S/P coronary artery stent placement  1/6/09      S/P placement of cardiac pacemaker          SOCIAL HISTORY: Substance Use (street drugs): ( x ) never used  (  ) other:    FAMILY HISTORY:  No pertinent family history in first degree relatives            PHYSICAL EXAM:  T(C): 36.5 (01-06-24 @ 10:22), Max: 36.8 (01-06-24 @ 02:00)  HR: 87 (01-06-24 @ 10:22) (80 - 98)  BP: 149/64 (01-06-24 @ 10:22) (149/64 - 199/98)  RR: 18 (01-06-24 @ 10:22) (17 - 22)  SpO2: 100% (01-06-24 @ 10:22) (94% - 100%)  Wt(kg): --  I&O's Summary    05 Jan 2024 07:01  -  06 Jan 2024 07:00  --------------------------------------------------------  IN: 375 mL / OUT: 550 mL / NET: -175 mL          GENERAL: NAD  EYES:  conjunctiva and sclera clear  ENMT: No tonsillar erythema, exudates, or enlargement  Cardiovascular: Normal S1 S2, No JVD, No murmurs, No edema  Respiratory: Lungs clear to auscultation	  Gastrointestinal:  Soft,   Extremities: No edema                                     9.0    10.27 )-----------( 301      ( 06 Jan 2024 07:35 )             27.7     01-06    143  |  111<H>  |  20  ----------------------------<  195<H>  5.0   |  20<L>  |  1.49<H>    Ca    8.2<L>      06 Jan 2024 07:35  Phos  2.7     01-06  Mg     1.90     01-06    TPro  6.2  /  Alb  2.8<L>  /  TBili  0.6  /  DBili  x   /  AST  33  /  ALT  19  /  AlkPhos  62  01-06    proBNP:   Lipid Profile:   HgA1c:   TSH:     Consultant(s) Notes Reviewed:  [x ] YES  [ ] NO    Care Discussed with Consultants/Other Providers [ x] YES  [ ] NO    Imaging Personally Reviewed independently:  [x] YES  [ ] NO    All labs, radiologic studies, vitals, orders and medications list reviewed. Patient is seen and examined at bedside. Case discussed with medical team.                 Aashish Boyer MD  Interventional Cardiology / Endovascular Specialist  [place] Office : [address] [place] [address] Tel:   [place] Office : [address] [facility] [address]  Tel: [phone]      Subjective/Overnight events: Patient lying in bed c/o of SOB  	  MEDICATIONS:  aspirin  chewable 81 milliGRAM(s) Oral daily  heparin   Injectable 5000 Unit(s) SubCutaneous every 8 hours  metoprolol tartrate Injectable 5 milliGRAM(s) IV Push every 6 hours  ranolazine 500 milliGRAM(s) Oral two times a day  ticagrelor 60 milliGRAM(s) Enteral Tube every 12 hours      albuterol/ipratropium for Nebulization 3 milliLiter(s) Nebulizer every 12 hours  dornase cierra Solution 2.5 milliGRAM(s) Inhalation every 12 hours  mometasone 110 MICROgram(s) Inhaler 2 Puff(s) Inhalation every 12 hours    acetaminophen   IVPB .. 1000 milliGRAM(s) IV Intermittent every 6 hours PRN  escitalopram 10 milliGRAM(s) Oral daily  gabapentin 100 milliGRAM(s) Oral two times a day  levETIRAcetam   Injectable 250 milliGRAM(s) IV Push every 12 hours  memantine 5 milliGRAM(s) Oral two times a day    pantoprazole  Injectable 40 milliGRAM(s) IV Push every 12 hours  sucralfate 1 Gram(s) Oral every 12 hours    insulin glargine Injectable (LANTUS) 14 Unit(s) SubCutaneous at bedtime  insulin lispro (ADMELOG) corrective regimen sliding scale   SubCutaneous every 6 hours    dextrose 5% + sodium chloride 0.45% with potassium chloride 20 mEq/L 1000 milliLiter(s) IV Continuous <Continuous>  potassium phosphate / sodium phosphate Powder (PHOS-NaK) 1 Packet(s) Oral two times a day      PAST MEDICAL/SURGICAL HISTORY  PAST MEDICAL & SURGICAL HISTORY:  Hyperlipemia      Hypertension      Coronary Artery Disease      Diabetes Mellitus Type II      Stented Coronary Artery  total 5 stents, last stent 5/2019      Neuropathy      Myocardial infarction      Stroke  mild left facial numbness   no other residuals verbalized      Myoclonic jerking      Stage 3 chronic kidney disease      History of Cataract Extraction      Hx of CABG      H/O coronary angiogram      S/P coronary artery stent placement  1/6/09      S/P placement of cardiac pacemaker          SOCIAL HISTORY: Substance Use (street drugs): ( x ) never used  (  ) other:    FAMILY HISTORY:  No pertinent family history in first degree relatives            PHYSICAL EXAM:  T(C): 36.5 (01-06-24 @ 10:22), Max: 36.8 (01-06-24 @ 02:00)  HR: 87 (01-06-24 @ 10:22) (80 - 98)  BP: 149/64 (01-06-24 @ 10:22) (149/64 - 199/98)  RR: 18 (01-06-24 @ 10:22) (17 - 22)  SpO2: 100% (01-06-24 @ 10:22) (94% - 100%)  Wt(kg): --  I&O's Summary    05 Jan 2024 07:01  -  06 Jan 2024 07:00  --------------------------------------------------------  IN: 375 mL / OUT: 550 mL / NET: -175 mL          GENERAL: NAD  EYES:  conjunctiva and sclera clear  ENMT: No tonsillar erythema, exudates, or enlargement  Cardiovascular: Normal S1 S2, No JVD, No murmurs, No edema  Respiratory: Lungs clear to auscultation	  Gastrointestinal:  Soft,   Extremities: No edema                                     9.0    10.27 )-----------( 301      ( 06 Jan 2024 07:35 )             27.7     01-06    143  |  111<H>  |  20  ----------------------------<  195<H>  5.0   |  20<L>  |  1.49<H>    Ca    8.2<L>      06 Jan 2024 07:35  Phos  2.7     01-06  Mg     1.90     01-06    TPro  6.2  /  Alb  2.8<L>  /  TBili  0.6  /  DBili  x   /  AST  33  /  ALT  19  /  AlkPhos  62  01-06    proBNP:   Lipid Profile:   HgA1c:   TSH:     Consultant(s) Notes Reviewed:  [x ] YES  [ ] NO    Care Discussed with Consultants/Other Providers [ x] YES  [ ] NO    Imaging Personally Reviewed independently:  [x] YES  [ ] NO    All labs, radiologic studies, vitals, orders and medications list reviewed. Patient is seen and examined at bedside. Case discussed with medical team.

## 2024-01-06 NOTE — CONSULT NOTE ADULT - ASSESSMENT
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 CTA demonstrating mesenteric fat stranding associated with ascending/transverse colon. S/p SMA angiography with stent placement with diagnostic laparoscopy 12/24, small bowel and visible colon viable, some inflammation of omentum in RUQ. Course c/b GI bleed, s/p scope on 1/2 with GI with clips placed. Patient transferred overnight from SICU to the floor in clinically stable condition.

## 2024-01-06 NOTE — PROGRESS NOTE ADULT - ASSESSMENT
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     EKG SR RBBB (old per family     1) CAD s/p CABG  -p/w chest pain. EKG non ischemic , trop -sera   - echo with normal lv and moderate AS   - c/w metoprolol   - chest pains  Trop mildly elevated and trending down likley sec to kidney disease,    -1/6 c/o of SOB obtain CXR ?aspiration rec pulm c/s     2) Covid +  - s/p remdesivir   - c/w supportive management   - t/t per primary team     3) Abd distension   -CTA AP obtained which showed  partially occlusive calcified and non-calcified plaque just distal to take-off of the SMA, with estimated at least 75% luminal narrowing. Limited evaluation of its distal branches. Patient taken emergently to OR on 12/24 s/p diagnostic laparoscopy and SMA stent placement with brachial cutdown  -Surgery/vascular on board , f/u recs  - HIDA scan + for acute asa, medical management as poor surgical candidate cont zosyn  - asa and Brilliant restarted      4) UGIB  -GI on board s/p  EGD 1/2/24 which revealed bleeding vessel (likely Dieulafoy) s/p clipping and NGT trauma s/p clipping, fundus not fully visualized 2/2 clot, minute Tushar III lesion in duodenum.   -on Protonix IV q 12

## 2024-01-06 NOTE — CONSULT NOTE ADULT - SUBJECTIVE AND OBJECTIVE BOX
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 CTA demonstrating mesenteric fat stranding associated with ascending/transverse colon. S/p SMA angiography with stent placement with diagnostic laparoscopy 12/24, small bowel and visible colon viable, some inflammation of omentum in RUQ. Course c/b GI bleed, s/p scope on 1/2 with GI with clips placed. Patient transferred overnight from SICU to the floor in clinically stable condition.      PAST MEDICAL & SURGICAL HISTORY:  Hyperlipemia      Hypertension      Coronary Artery Disease      Diabetes Mellitus Type II      Stented Coronary Artery  total 5 stents, last stent 5/2019      Neuropathy      Myocardial infarction      Stroke  mild left facial numbness   no other residuals verbalized      Myoclonic jerking      Stage 3 chronic kidney disease      History of Cataract Extraction      Hx of CABG      H/O coronary angiogram      S/P coronary artery stent placement  1/6/09      S/P placement of cardiac pacemaker          Social History: No smoking     FAMILY HISTORY:  No pertinent family history in first degree relatives        Allergies    fluoroquinolone antibiotics (Other)  Cipro (Unknown)  Tegretol (Unknown)  carbamazepine (Other)    Intolerances            MEDICATIONS  (STANDING):  albuterol/ipratropium for Nebulization 3 milliLiter(s) Nebulizer every 12 hours  amLODIPine   Tablet 5 milliGRAM(s) Oral daily  aspirin  chewable 81 milliGRAM(s) Oral daily  dextrose 5% + sodium chloride 0.45% with potassium chloride 20 mEq/L 1000 milliLiter(s) (75 mL/Hr) IV Continuous <Continuous>  dornase cierra Solution 2.5 milliGRAM(s) Inhalation every 12 hours  escitalopram 10 milliGRAM(s) Oral daily  gabapentin 100 milliGRAM(s) Oral two times a day  heparin   Injectable 5000 Unit(s) SubCutaneous every 8 hours  insulin glargine Injectable (LANTUS) 14 Unit(s) SubCutaneous at bedtime  insulin lispro (ADMELOG) corrective regimen sliding scale   SubCutaneous every 6 hours  levETIRAcetam   Injectable 250 milliGRAM(s) IV Push every 12 hours  memantine 5 milliGRAM(s) Oral two times a day  metoprolol tartrate Injectable 5 milliGRAM(s) IV Push every 6 hours  mometasone 110 MICROgram(s) Inhaler 2 Puff(s) Inhalation every 12 hours  pantoprazole  Injectable 40 milliGRAM(s) IV Push every 12 hours  potassium phosphate / sodium phosphate Powder (PHOS-NaK) 1 Packet(s) Oral two times a day  ranolazine 500 milliGRAM(s) Oral two times a day  sucralfate 1 Gram(s) Oral every 12 hours  ticagrelor 60 milliGRAM(s) Enteral Tube every 12 hours    MEDICATIONS  (PRN):  acetaminophen   IVPB .. 1000 milliGRAM(s) IV Intermittent every 6 hours PRN Mild Pain (1 - 3), Moderate Pain (4 - 6)      Vital Signs Last 24 Hrs  T(C): 36.5 (06 Jan 2024 17:24), Max: 36.8 (06 Jan 2024 02:00)  T(F): 97.7 (06 Jan 2024 17:24), Max: 98.3 (06 Jan 2024 02:00)  HR: 83 (06 Jan 2024 17:24) (80 - 95)  BP: 166/60 (06 Jan 2024 17:24) (145/82 - 199/98)  BP(mean): --  RR: 19 (06 Jan 2024 17:24) (18 - 22)  SpO2: 99% (06 Jan 2024 17:24) (97% - 100%)    Parameters below as of 06 Jan 2024 17:24  Patient On (Oxygen Delivery Method): room air        PHYSICAL EXAM:    GENERAL: NAD, well-groomed, well-developed  HEAD:  Atraumatic, Normocephalic  EYES: EOMI, PERRLA, conjunctiva and sclera clear  ENMT:  NG Tube   NECK: Supple, No JVD, Normal thyroid  NERVOUS SYSTEM:  Alert & Oriented X 2 to 3, No new  focal sign .  CHEST/LUNG: Air entry good bilaterally; No rales, rhonchi, wheezing, or rubs  HEART: Regular rate and rhythm; No murmurs, rubs, or gallops  ABDOMEN: Soft, Nontender, Nondistended; Bowel sounds present, Incision wounds +   EXTREMITIES:  2+ Peripheral Pulses, No clubbing, cyanosis, or edema      LABS:                        9.0    10.27 )-----------( 301      ( 06 Jan 2024 07:35 )             27.7     01-06    143  |  111<H>  |  20  ----------------------------<  195<H>  5.0   |  20<L>  |  1.49<H>    Ca    8.2<L>      06 Jan 2024 07:35  Phos  2.7     01-06  Mg     1.90     01-06    TPro  6.2  /  Alb  2.8<L>  /  TBili  0.6  /  DBili  x   /  AST  33  /  ALT  19  /  AlkPhos  62  01-06      Urinalysis Basic - ( 06 Jan 2024 07:35 )    Color: x / Appearance: x / SG: x / pH: x  Gluc: 195 mg/dL / Ketone: x  / Bili: x / Urobili: x   Blood: x / Protein: x / Nitrite: x   Leuk Esterase: x / RBC: x / WBC x   Sq Epi: x / Non Sq Epi: x / Bacteria: x          RADIOLOGY & ADDITIONAL STUDIES:

## 2024-01-06 NOTE — PROGRESS NOTE ADULT - SUBJECTIVE AND OBJECTIVE BOX
GENERAL SURGERY PROGRESS NOTE    Patient: SHAIK DONOHUE , 89y ([birth year])Male   MRN: [id number]  Location: [facility] [address]  Visit: 12-23-23 Inpatient  Date: 01-06-24 @ 09:31    Subjective: Patient pulled out KO feed tube overnight, night team tried to replace tube unsuccessfully. Patient also had 2 tarry stools overnight. Patient resting comfortably in bed, no complaints. Passing gas, having bowel function. No N/V.     PAST MEDICAL & SURGICAL HISTORY:  Hyperlipemia      Hypertension      Coronary Artery Disease      Diabetes Mellitus Type II      Stented Coronary Artery  total 5 stents, last stent 5/2019      Neuropathy      Myocardial infarction      Stroke  mild left facial numbness   no other residuals verbalized      Myoclonic jerking      Stage 3 chronic kidney disease      History of Cataract Extraction      Hx of CABG      H/O coronary angiogram      S/P coronary artery stent placement  1/6/09      S/P placement of cardiac pacemaker          Vitals: T(F): 97.9 (01-06-24 @ 05:00), Max: 99.4 (01-05-24 @ 12:00)  HR: 80 (01-06-24 @ 06:45)  BP: 153/65 (01-06-24 @ 05:00)  RR: 19 (01-06-24 @ 05:00)  SpO2: 100% (01-06-24 @ 06:45)      Diet, NPO with Tube Feed:   Tube Feeding Modality: Nasogastric  Glucerna 1.5 Prince (GLUCERNA1.5RTH)  Total Volume for 24 Hours (mL): 1200  Continuous  Starting Tube Feed Rate mL per Hour: 25  Increase Tube Feed Rate by (mL): 10     Every 6 hours  Until Goal Tube Feed Rate (mL per Hour): 50  Tube Feed Duration (in Hours): 24  Tube Feed Start Time: 16:14      01-05-24 @ 07:01  -  01-06-24 @ 07:00  --------------------------------------------------------  IN:    dextrose 5% + sodium chloride 0.45% w/ Additives: 375 mL  Total IN: 375 mL    OUT:    Voided (mL): 550 mL  Total OUT: 550 mL    Total NET: -175 mL    PHYSICAL EXAM  GEN: No acute distress   CV: RRR, no JVD  LUNGS: Respirations unlabored, lots of secretions with mild wheezing.   ABD: Abdomen soft, NT, ND  EXT: No peripheral edema. Regular range of motion.     MEDICATIONS  (STANDING):  albuterol/ipratropium for Nebulization 3 milliLiter(s) Nebulizer every 12 hours  aspirin  chewable 81 milliGRAM(s) Oral daily  dextrose 5% + sodium chloride 0.45% with potassium chloride 20 mEq/L 1000 milliLiter(s) (75 mL/Hr) IV Continuous <Continuous>  dornase cierra Solution 2.5 milliGRAM(s) Inhalation every 12 hours  escitalopram 10 milliGRAM(s) Oral daily  gabapentin 100 milliGRAM(s) Oral two times a day  heparin   Injectable 5000 Unit(s) SubCutaneous every 8 hours  insulin glargine Injectable (LANTUS) 14 Unit(s) SubCutaneous at bedtime  insulin lispro (ADMELOG) corrective regimen sliding scale   SubCutaneous every 6 hours  levETIRAcetam   Injectable 250 milliGRAM(s) IV Push every 12 hours  memantine 5 milliGRAM(s) Oral two times a day  metoprolol tartrate Injectable 5 milliGRAM(s) IV Push every 6 hours  mometasone 110 MICROgram(s) Inhaler 2 Puff(s) Inhalation every 12 hours  pantoprazole  Injectable 40 milliGRAM(s) IV Push every 12 hours  ranolazine 500 milliGRAM(s) Oral two times a day  sucralfate 1 Gram(s) Oral every 12 hours  ticagrelor 60 milliGRAM(s) Enteral Tube every 12 hours    MEDICATIONS  (PRN):  acetaminophen   IVPB .. 1000 milliGRAM(s) IV Intermittent every 6 hours PRN Mild Pain (1 - 3), Moderate Pain (4 - 6)      DVT PROPHYLAXIS: SCDs, heparin   Injectable 5000 Unit(s) SubCutaneous every 8 hours    GI PROPHYLAXIS: pantoprazole  Injectable 40 milliGRAM(s) IV Push every 12 hours    ANTICOAGULATION:   ANTIBIOTICS:       LAB/STUDIES:  CAPILLARY BLOOD GLUCOSE      POCT Blood Glucose.: 198 mg/dL (06 Jan 2024 05:38)  POCT Blood Glucose.: 202 mg/dL (06 Jan 2024 00:28)  POCT Blood Glucose.: 199 mg/dL (05 Jan 2024 21:14)  POCT Blood Glucose.: 191 mg/dL (05 Jan 2024 17:57)  POCT Blood Glucose.: 175 mg/dL (05 Jan 2024 12:12)                          9.0    10.27 )-----------( 301      ( 06 Jan 2024 07:35 )             27.7     01-06    143  |  111<H>  |  20  ----------------------------<  195<H>  5.0   |  20<L>  |  1.49<H>    Ca    8.2<L>      06 Jan 2024 07:35  Phos  2.7     01-06  Mg     1.90     01-06    TPro  6.2  /  Alb  2.8<L>  /  TBili  0.6  /  DBili  x   /  AST  33  /  ALT  19  /  AlkPhos  62  01-06               6.2  | 0.6  | 33       ------------------[62      ( 06 Jan 2024 07:35 )  2.8  | x    | 19          Lipase:x      Amylase:x          Urinalysis Basic - ( 06 Jan 2024 07:35 )    Color: x / Appearance: x / SG: x / pH: x  Gluc: 195 mg/dL / Ketone: x  / Bili: x / Urobili: x   Blood: x / Protein: x / Nitrite: x   Leuk Esterase: x / RBC: x / WBC x   Sq Epi: x / Non Sq Epi: x / Bacteria: x    Assessment:   89M w/ PMHx CAD (s/p CABG, s/p stents on ASA/brillinta), s/p PPM, DM2, CKD (baseline Cr 1.2-1.3 as per family), PVD, HTN, HLD, CVA x3 (without residual deficits), and 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 CTA demonstrating mesenteric fat stranding associated with ascending/transverse colon. S/p SMA angiography with stent placement with diagnostic laparoscopy 12/24, small bowel and visible colon viable, some inflammation of omentum in RUQ. Course c/b GI bleed, s/p scope on 1/2 with GI with clips placed.     Plan:   - Diet: NPO 2/2 dysphagia, Will try to replace KO feed today   - consider repeat SLP vs cinesphogram  - Pain control PRN  - protonix infusion x72 hrs post scope, then protonix bid  - continue keppra  - acute cholecystitis s/p IV abx, tolerating abx.  - ASA, Brillinta, HSQ   - Medicine following    C Team Surgery   [id number]     GENERAL SURGERY PROGRESS NOTE    Patient: SHAIK DONOHUE , 89y ([birth year])Male   MRN: [id number]  Location: [facility] [address]  Visit: 12-23-23 Inpatient  Date: 01-06-24 @ 09:31    Subjective: Patient pulled out KO feed tube overnight, night team tried to replace tube unsuccessfully. Patient also had 2 tarry stools overnight. Patient resting comfortably in bed, no complaints. Passing gas, having bowel function. No N/V.     PAST MEDICAL & SURGICAL HISTORY:  Hyperlipemia      Hypertension      Coronary Artery Disease      Diabetes Mellitus Type II      Stented Coronary Artery  total 5 stents, last stent 5/2019      Neuropathy      Myocardial infarction      Stroke  mild left facial numbness   no other residuals verbalized      Myoclonic jerking      Stage 3 chronic kidney disease      History of Cataract Extraction      Hx of CABG      H/O coronary angiogram      S/P coronary artery stent placement  1/6/09      S/P placement of cardiac pacemaker          Vitals: T(F): 97.9 (01-06-24 @ 05:00), Max: 99.4 (01-05-24 @ 12:00)  HR: 80 (01-06-24 @ 06:45)  BP: 153/65 (01-06-24 @ 05:00)  RR: 19 (01-06-24 @ 05:00)  SpO2: 100% (01-06-24 @ 06:45)      Diet, NPO with Tube Feed:   Tube Feeding Modality: Nasogastric  Glucerna 1.5 Prince (GLUCERNA1.5RTH)  Total Volume for 24 Hours (mL): 1200  Continuous  Starting Tube Feed Rate mL per Hour: 25  Increase Tube Feed Rate by (mL): 10     Every 6 hours  Until Goal Tube Feed Rate (mL per Hour): 50  Tube Feed Duration (in Hours): 24  Tube Feed Start Time: 16:14      01-05-24 @ 07:01  -  01-06-24 @ 07:00  --------------------------------------------------------  IN:    dextrose 5% + sodium chloride 0.45% w/ Additives: 375 mL  Total IN: 375 mL    OUT:    Voided (mL): 550 mL  Total OUT: 550 mL    Total NET: -175 mL    PHYSICAL EXAM  GEN: No acute distress   CV: RRR, no JVD  LUNGS: Respirations unlabored, lots of secretions with mild wheezing.   ABD: Abdomen soft, NT, ND  EXT: No peripheral edema. Regular range of motion.     MEDICATIONS  (STANDING):  albuterol/ipratropium for Nebulization 3 milliLiter(s) Nebulizer every 12 hours  aspirin  chewable 81 milliGRAM(s) Oral daily  dextrose 5% + sodium chloride 0.45% with potassium chloride 20 mEq/L 1000 milliLiter(s) (75 mL/Hr) IV Continuous <Continuous>  dornase cierra Solution 2.5 milliGRAM(s) Inhalation every 12 hours  escitalopram 10 milliGRAM(s) Oral daily  gabapentin 100 milliGRAM(s) Oral two times a day  heparin   Injectable 5000 Unit(s) SubCutaneous every 8 hours  insulin glargine Injectable (LANTUS) 14 Unit(s) SubCutaneous at bedtime  insulin lispro (ADMELOG) corrective regimen sliding scale   SubCutaneous every 6 hours  levETIRAcetam   Injectable 250 milliGRAM(s) IV Push every 12 hours  memantine 5 milliGRAM(s) Oral two times a day  metoprolol tartrate Injectable 5 milliGRAM(s) IV Push every 6 hours  mometasone 110 MICROgram(s) Inhaler 2 Puff(s) Inhalation every 12 hours  pantoprazole  Injectable 40 milliGRAM(s) IV Push every 12 hours  ranolazine 500 milliGRAM(s) Oral two times a day  sucralfate 1 Gram(s) Oral every 12 hours  ticagrelor 60 milliGRAM(s) Enteral Tube every 12 hours    MEDICATIONS  (PRN):  acetaminophen   IVPB .. 1000 milliGRAM(s) IV Intermittent every 6 hours PRN Mild Pain (1 - 3), Moderate Pain (4 - 6)      DVT PROPHYLAXIS: SCDs, heparin   Injectable 5000 Unit(s) SubCutaneous every 8 hours    GI PROPHYLAXIS: pantoprazole  Injectable 40 milliGRAM(s) IV Push every 12 hours    ANTICOAGULATION:   ANTIBIOTICS:       LAB/STUDIES:  CAPILLARY BLOOD GLUCOSE      POCT Blood Glucose.: 198 mg/dL (06 Jan 2024 05:38)  POCT Blood Glucose.: 202 mg/dL (06 Jan 2024 00:28)  POCT Blood Glucose.: 199 mg/dL (05 Jan 2024 21:14)  POCT Blood Glucose.: 191 mg/dL (05 Jan 2024 17:57)  POCT Blood Glucose.: 175 mg/dL (05 Jan 2024 12:12)                          9.0    10.27 )-----------( 301      ( 06 Jan 2024 07:35 )             27.7     01-06    143  |  111<H>  |  20  ----------------------------<  195<H>  5.0   |  20<L>  |  1.49<H>    Ca    8.2<L>      06 Jan 2024 07:35  Phos  2.7     01-06  Mg     1.90     01-06    TPro  6.2  /  Alb  2.8<L>  /  TBili  0.6  /  DBili  x   /  AST  33  /  ALT  19  /  AlkPhos  62  01-06               6.2  | 0.6  | 33       ------------------[62      ( 06 Jan 2024 07:35 )  2.8  | x    | 19          Lipase:x      Amylase:x          Urinalysis Basic - ( 06 Jan 2024 07:35 )    Color: x / Appearance: x / SG: x / pH: x  Gluc: 195 mg/dL / Ketone: x  / Bili: x / Urobili: x   Blood: x / Protein: x / Nitrite: x   Leuk Esterase: x / RBC: x / WBC x   Sq Epi: x / Non Sq Epi: x / Bacteria: x    Assessment:   89M w/ PMHx CAD (s/p CABG, s/p stents on ASA/brillinta), s/p PPM, DM2, CKD (baseline Cr 1.2-1.3 as per family), PVD, HTN, HLD, CVA x3 (without residual deficits), and 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 CTA demonstrating mesenteric fat stranding associated with ascending/transverse colon. S/p SMA angiography with stent placement with diagnostic laparoscopy 12/24, small bowel and visible colon viable, some inflammation of omentum in RUQ. Course c/b GI bleed, s/p scope on 1/2 with GI with clips placed.     Plan:   - Diet: NPO 2/2 dysphagia, Will try to replace KO feed today   - consider repeat SLP vs cinesphogram  - Pain control PRN  - protonix infusion x72 hrs post scope, then protonix bid  - continue keppra  - acute cholecystitis s/p IV abx, tolerating abx.  - ASA, Brillinta, HSQ   - Medicine following    C Team Surgery   [id number]     GENERAL SURGERY PROGRESS NOTE    Patient: SHAIK DONOHUE , 89y ([birth year])Male   MRN: [id number]  Location: [facility] [address]  Visit: 12-23-23 Inpatient  Date: 01-06-24 @ 09:31    Subjective: Patient pulled out KO feed tube overnight, night team tried to replace tube unsuccessfully. Patient also had 2 tarry stools overnight. Patient resting comfortably in bed, no complaints. Passing gas, having bowel function. No N/V.     PAST MEDICAL & SURGICAL HISTORY:  Hyperlipemia      Hypertension      Coronary Artery Disease      Diabetes Mellitus Type II      Stented Coronary Artery  total 5 stents, last stent 5/2019      Neuropathy      Myocardial infarction      Stroke  mild left facial numbness   no other residuals verbalized      Myoclonic jerking      Stage 3 chronic kidney disease      History of Cataract Extraction      Hx of CABG      H/O coronary angiogram      S/P coronary artery stent placement  1/6/09      S/P placement of cardiac pacemaker          Vitals: T(F): 97.9 (01-06-24 @ 05:00), Max: 99.4 (01-05-24 @ 12:00)  HR: 80 (01-06-24 @ 06:45)  BP: 153/65 (01-06-24 @ 05:00)  RR: 19 (01-06-24 @ 05:00)  SpO2: 100% (01-06-24 @ 06:45)      Diet, NPO with Tube Feed:   Tube Feeding Modality: Nasogastric  Glucerna 1.5 Prince (GLUCERNA1.5RTH)  Total Volume for 24 Hours (mL): 1200  Continuous  Starting Tube Feed Rate mL per Hour: 25  Increase Tube Feed Rate by (mL): 10     Every 6 hours  Until Goal Tube Feed Rate (mL per Hour): 50  Tube Feed Duration (in Hours): 24  Tube Feed Start Time: 16:14      01-05-24 @ 07:01  -  01-06-24 @ 07:00  --------------------------------------------------------  IN:    dextrose 5% + sodium chloride 0.45% w/ Additives: 375 mL  Total IN: 375 mL    OUT:    Voided (mL): 550 mL  Total OUT: 550 mL    Total NET: -175 mL    PHYSICAL EXAM  GEN: No acute distress   CV: RRR, no JVD  LUNGS: Respirations unlabored, lots of secretions with mild wheezing.   ABD: Abdomen soft, NT, ND  EXT: No peripheral edema. Regular range of motion.     MEDICATIONS  (STANDING):  albuterol/ipratropium for Nebulization 3 milliLiter(s) Nebulizer every 12 hours  aspirin  chewable 81 milliGRAM(s) Oral daily  dextrose 5% + sodium chloride 0.45% with potassium chloride 20 mEq/L 1000 milliLiter(s) (75 mL/Hr) IV Continuous <Continuous>  dornase cierra Solution 2.5 milliGRAM(s) Inhalation every 12 hours  escitalopram 10 milliGRAM(s) Oral daily  gabapentin 100 milliGRAM(s) Oral two times a day  heparin   Injectable 5000 Unit(s) SubCutaneous every 8 hours  insulin glargine Injectable (LANTUS) 14 Unit(s) SubCutaneous at bedtime  insulin lispro (ADMELOG) corrective regimen sliding scale   SubCutaneous every 6 hours  levETIRAcetam   Injectable 250 milliGRAM(s) IV Push every 12 hours  memantine 5 milliGRAM(s) Oral two times a day  metoprolol tartrate Injectable 5 milliGRAM(s) IV Push every 6 hours  mometasone 110 MICROgram(s) Inhaler 2 Puff(s) Inhalation every 12 hours  pantoprazole  Injectable 40 milliGRAM(s) IV Push every 12 hours  ranolazine 500 milliGRAM(s) Oral two times a day  sucralfate 1 Gram(s) Oral every 12 hours  ticagrelor 60 milliGRAM(s) Enteral Tube every 12 hours    MEDICATIONS  (PRN):  acetaminophen   IVPB .. 1000 milliGRAM(s) IV Intermittent every 6 hours PRN Mild Pain (1 - 3), Moderate Pain (4 - 6)      DVT PROPHYLAXIS: SCDs, heparin   Injectable 5000 Unit(s) SubCutaneous every 8 hours    GI PROPHYLAXIS: pantoprazole  Injectable 40 milliGRAM(s) IV Push every 12 hours    ANTICOAGULATION:   ANTIBIOTICS:       LAB/STUDIES:  CAPILLARY BLOOD GLUCOSE      POCT Blood Glucose.: 198 mg/dL (06 Jan 2024 05:38)  POCT Blood Glucose.: 202 mg/dL (06 Jan 2024 00:28)  POCT Blood Glucose.: 199 mg/dL (05 Jan 2024 21:14)  POCT Blood Glucose.: 191 mg/dL (05 Jan 2024 17:57)  POCT Blood Glucose.: 175 mg/dL (05 Jan 2024 12:12)                          9.0    10.27 )-----------( 301      ( 06 Jan 2024 07:35 )             27.7     01-06    143  |  111<H>  |  20  ----------------------------<  195<H>  5.0   |  20<L>  |  1.49<H>    Ca    8.2<L>      06 Jan 2024 07:35  Phos  2.7     01-06  Mg     1.90     01-06    TPro  6.2  /  Alb  2.8<L>  /  TBili  0.6  /  DBili  x   /  AST  33  /  ALT  19  /  AlkPhos  62  01-06               6.2  | 0.6  | 33       ------------------[62      ( 06 Jan 2024 07:35 )  2.8  | x    | 19          Lipase:x      Amylase:x          Urinalysis Basic - ( 06 Jan 2024 07:35 )    Color: x / Appearance: x / SG: x / pH: x  Gluc: 195 mg/dL / Ketone: x  / Bili: x / Urobili: x   Blood: x / Protein: x / Nitrite: x   Leuk Esterase: x / RBC: x / WBC x   Sq Epi: x / Non Sq Epi: x / Bacteria: x    Assessment:   89M w/ PMHx CAD (s/p CABG, s/p stents on ASA/brillinta), s/p PPM, DM2, CKD (baseline Cr 1.2-1.3 as per family), PVD, HTN, HLD, CVA x3 (without residual deficits), and 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 CTA demonstrating mesenteric fat stranding associated with ascending/transverse colon. S/p SMA angiography with stent placement with diagnostic laparoscopy 12/24, small bowel and visible colon viable, some inflammation of omentum in RUQ. Course c/b GI bleed, s/p scope on 1/2 with GI with clips placed.     Plan:   - Diet: NPO 2/2 dysphagia, Will try to replace KO feed today   - consider repeat SLP vs cinesphogram  - Pain control PRN  - protonix infusion x72 hrs post scope, then protonix bid  - continue keppra  - acute cholecystitis s/p IV abx, tolerating abx.  - ASA, Brillinta, HSQ   - Added norvasc 5 for HTN in addition to metoprolol   - Medicine following    C Team Surgery   [id number]     GENERAL SURGERY PROGRESS NOTE    Patient: SHAIK DONOHUE , 89y ([birth year])Male   MRN: [id number]  Location: [facility] [address]  Visit: 12-23-23 Inpatient  Date: 01-06-24 @ 09:31    Subjective: Patient pulled out KO feed tube overnight, night team tried to replace tube unsuccessfully. Patient also had 2 tarry stools overnight. Patient resting comfortably in bed, no complaints. Passing gas, having bowel function. No N/V.     PAST MEDICAL & SURGICAL HISTORY:  Hyperlipemia      Hypertension      Coronary Artery Disease      Diabetes Mellitus Type II      Stented Coronary Artery  total 5 stents, last stent 5/2019      Neuropathy      Myocardial infarction      Stroke  mild left facial numbness   no other residuals verbalized      Myoclonic jerking      Stage 3 chronic kidney disease      History of Cataract Extraction      Hx of CABG      H/O coronary angiogram      S/P coronary artery stent placement  1/6/09      S/P placement of cardiac pacemaker          Vitals: T(F): 97.9 (01-06-24 @ 05:00), Max: 99.4 (01-05-24 @ 12:00)  HR: 80 (01-06-24 @ 06:45)  BP: 153/65 (01-06-24 @ 05:00)  RR: 19 (01-06-24 @ 05:00)  SpO2: 100% (01-06-24 @ 06:45)      Diet, NPO with Tube Feed:   Tube Feeding Modality: Nasogastric  Glucerna 1.5 Prince (GLUCERNA1.5RTH)  Total Volume for 24 Hours (mL): 1200  Continuous  Starting Tube Feed Rate mL per Hour: 25  Increase Tube Feed Rate by (mL): 10     Every 6 hours  Until Goal Tube Feed Rate (mL per Hour): 50  Tube Feed Duration (in Hours): 24  Tube Feed Start Time: 16:14      01-05-24 @ 07:01  -  01-06-24 @ 07:00  --------------------------------------------------------  IN:    dextrose 5% + sodium chloride 0.45% w/ Additives: 375 mL  Total IN: 375 mL    OUT:    Voided (mL): 550 mL  Total OUT: 550 mL    Total NET: -175 mL    PHYSICAL EXAM  GEN: No acute distress   CV: RRR, no JVD  LUNGS: Respirations unlabored, lots of secretions with mild wheezing.   ABD: Abdomen soft, NT, ND  EXT: No peripheral edema. Regular range of motion.     MEDICATIONS  (STANDING):  albuterol/ipratropium for Nebulization 3 milliLiter(s) Nebulizer every 12 hours  aspirin  chewable 81 milliGRAM(s) Oral daily  dextrose 5% + sodium chloride 0.45% with potassium chloride 20 mEq/L 1000 milliLiter(s) (75 mL/Hr) IV Continuous <Continuous>  dornase cierra Solution 2.5 milliGRAM(s) Inhalation every 12 hours  escitalopram 10 milliGRAM(s) Oral daily  gabapentin 100 milliGRAM(s) Oral two times a day  heparin   Injectable 5000 Unit(s) SubCutaneous every 8 hours  insulin glargine Injectable (LANTUS) 14 Unit(s) SubCutaneous at bedtime  insulin lispro (ADMELOG) corrective regimen sliding scale   SubCutaneous every 6 hours  levETIRAcetam   Injectable 250 milliGRAM(s) IV Push every 12 hours  memantine 5 milliGRAM(s) Oral two times a day  metoprolol tartrate Injectable 5 milliGRAM(s) IV Push every 6 hours  mometasone 110 MICROgram(s) Inhaler 2 Puff(s) Inhalation every 12 hours  pantoprazole  Injectable 40 milliGRAM(s) IV Push every 12 hours  ranolazine 500 milliGRAM(s) Oral two times a day  sucralfate 1 Gram(s) Oral every 12 hours  ticagrelor 60 milliGRAM(s) Enteral Tube every 12 hours    MEDICATIONS  (PRN):  acetaminophen   IVPB .. 1000 milliGRAM(s) IV Intermittent every 6 hours PRN Mild Pain (1 - 3), Moderate Pain (4 - 6)      DVT PROPHYLAXIS: SCDs, heparin   Injectable 5000 Unit(s) SubCutaneous every 8 hours    GI PROPHYLAXIS: pantoprazole  Injectable 40 milliGRAM(s) IV Push every 12 hours    ANTICOAGULATION:   ANTIBIOTICS:       LAB/STUDIES:  CAPILLARY BLOOD GLUCOSE      POCT Blood Glucose.: 198 mg/dL (06 Jan 2024 05:38)  POCT Blood Glucose.: 202 mg/dL (06 Jan 2024 00:28)  POCT Blood Glucose.: 199 mg/dL (05 Jan 2024 21:14)  POCT Blood Glucose.: 191 mg/dL (05 Jan 2024 17:57)  POCT Blood Glucose.: 175 mg/dL (05 Jan 2024 12:12)                          9.0    10.27 )-----------( 301      ( 06 Jan 2024 07:35 )             27.7     01-06    143  |  111<H>  |  20  ----------------------------<  195<H>  5.0   |  20<L>  |  1.49<H>    Ca    8.2<L>      06 Jan 2024 07:35  Phos  2.7     01-06  Mg     1.90     01-06    TPro  6.2  /  Alb  2.8<L>  /  TBili  0.6  /  DBili  x   /  AST  33  /  ALT  19  /  AlkPhos  62  01-06               6.2  | 0.6  | 33       ------------------[62      ( 06 Jan 2024 07:35 )  2.8  | x    | 19          Lipase:x      Amylase:x          Urinalysis Basic - ( 06 Jan 2024 07:35 )    Color: x / Appearance: x / SG: x / pH: x  Gluc: 195 mg/dL / Ketone: x  / Bili: x / Urobili: x   Blood: x / Protein: x / Nitrite: x   Leuk Esterase: x / RBC: x / WBC x   Sq Epi: x / Non Sq Epi: x / Bacteria: x    Assessment:   89M w/ PMHx CAD (s/p CABG, s/p stents on ASA/brillinta), s/p PPM, DM2, CKD (baseline Cr 1.2-1.3 as per family), PVD, HTN, HLD, CVA x3 (without residual deficits), and 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 CTA demonstrating mesenteric fat stranding associated with ascending/transverse colon. S/p SMA angiography with stent placement with diagnostic laparoscopy 12/24, small bowel and visible colon viable, some inflammation of omentum in RUQ. Course c/b GI bleed, s/p scope on 1/2 with GI with clips placed.     Plan:   - Diet: NPO 2/2 dysphagia, Will try to replace KO feed today   - consider repeat SLP vs cinesphogram  - Pain control PRN  - protonix infusion x72 hrs post scope, then protonix bid  - continue keppra  - acute cholecystitis s/p IV abx, tolerating abx.  - ASA, Brillinta, HSQ   - Added norvasc 5 for HTN in addition to metoprolol   - Medicine following    C Team Surgery   [id number]

## 2024-01-06 NOTE — CHART NOTE - NSCHARTNOTEFT_GEN_A_CORE
SICU Transfer Note    Transfer from: SICU  Transfer to: 3N  Accepting physician: Dr. Florentino    SICU COURSE: Patient is s/p SMA angiography with stent placement with diagnostic laparoscopy 12/24, small bowel and visible colon viable, some inflammation of omentum in RUQ. Course c/b GI bleed, s/p scope on 1/2 with GI with clips placed. Originally in the SICU for hemodynamic monitoring, now being transferred to the floor in clinically stable condition.    SUBJECTIVE:     ASSESSMENT:    PLAN:     Vital Signs Last 24 Hrs  T(C): 36.5 (05 Jan 2024 21:17), Max: 37.4 (05 Jan 2024 12:00)  T(F): 97.7 (05 Jan 2024 21:17), Max: 99.4 (05 Jan 2024 12:00)  HR: 95 (05 Jan 2024 21:17) (80 - 98)  BP: 199/98 (05 Jan 2024 21:17) (148/68 - 199/98)  BP(mean): 118 (05 Jan 2024 16:00) (88 - 118)  RR: 20 (05 Jan 2024 21:17) (15 - 22)  SpO2: 98% (05 Jan 2024 21:17) (94% - 100%)    Parameters below as of 05 Jan 2024 21:17  Patient On (Oxygen Delivery Method): nasal cannula      I&O's Summary    04 Jan 2024 07:01  -  05 Jan 2024 07:00  --------------------------------------------------------  IN: 2375 mL / OUT: 925 mL / NET: 1450 mL    05 Jan 2024 07:01  -  06 Jan 2024 01:19  --------------------------------------------------------  IN: 375 mL / OUT: 550 mL / NET: -175 mL          MEDICATIONS  (STANDING):  albuterol/ipratropium for Nebulization 3 milliLiter(s) Nebulizer every 12 hours  aspirin  chewable 81 milliGRAM(s) Oral daily  dextrose 5% + sodium chloride 0.45% with potassium chloride 20 mEq/L 1000 milliLiter(s) (75 mL/Hr) IV Continuous <Continuous>  dornase cierra Solution 2.5 milliGRAM(s) Inhalation every 12 hours  escitalopram 10 milliGRAM(s) Oral daily  gabapentin 100 milliGRAM(s) Oral two times a day  heparin   Injectable 5000 Unit(s) SubCutaneous every 8 hours  insulin glargine Injectable (LANTUS) 14 Unit(s) SubCutaneous at bedtime  insulin lispro (ADMELOG) corrective regimen sliding scale   SubCutaneous every 6 hours  levETIRAcetam   Injectable 250 milliGRAM(s) IV Push every 12 hours  memantine 5 milliGRAM(s) Oral two times a day  metoprolol tartrate Injectable 5 milliGRAM(s) IV Push every 6 hours  mometasone 110 MICROgram(s) Inhaler 2 Puff(s) Inhalation every 12 hours  pantoprazole  Injectable 40 milliGRAM(s) IV Push every 12 hours  ranolazine 500 milliGRAM(s) Oral two times a day  sucralfate 1 Gram(s) Oral every 12 hours  ticagrelor 60 milliGRAM(s) Enteral Tube every 12 hours    MEDICATIONS  (PRN):  acetaminophen   IVPB .. 1000 milliGRAM(s) IV Intermittent every 6 hours PRN Mild Pain (1 - 3), Moderate Pain (4 - 6)        LABS                                            9.2                   Neurophils% (auto):   x      (01-05 @ 22:19):    12.65)-----------(314          Lymphocytes% (auto):  x                                             28.8                   Eosinphils% (auto):   x        Manual%: Neutrophils x    ; Lymphocytes x    ; Eosinophils x    ; Bands%: x    ; Blasts x                                    142    |  111    |  22                  Calcium: 8.0   / iCa: x      (01-05 @ 22:19)    ----------------------------<  219       Magnesium: 1.90                             5.1     |  19     |  1.45             Phosphorous: 3.2 SICU Transfer Note    Transfer from: SICU  Transfer to: 3N  Accepting physician: Dr. Florentino    SICU COURSE: Patient is s/p SMA angiography with stent placement with diagnostic laparoscopy 12/24, small bowel and visible colon viable, some inflammation of omentum in RUQ. Course c/b GI bleed, s/p scope on 1/2 with GI with clips placed. Originally in the SICU for hemodynamic monitoring, now being transferred to the floor in clinically stable condition.    SUBJECTIVE: On the floor patient was tachycardic to 179/71, with 1 episode of dark melena, and self dc'd the KO feeding tube.     ASSESSMENT:    PLAN:     Vital Signs Last 24 Hrs  T(C): 36.5 (05 Jan 2024 21:17), Max: 37.4 (05 Jan 2024 12:00)  T(F): 97.7 (05 Jan 2024 21:17), Max: 99.4 (05 Jan 2024 12:00)  HR: 95 (05 Jan 2024 21:17) (80 - 98)  BP: 199/98 (05 Jan 2024 21:17) (148/68 - 199/98)  BP(mean): 118 (05 Jan 2024 16:00) (88 - 118)  RR: 20 (05 Jan 2024 21:17) (15 - 22)  SpO2: 98% (05 Jan 2024 21:17) (94% - 100%)    Parameters below as of 05 Jan 2024 21:17  Patient On (Oxygen Delivery Method): nasal cannula      I&O's Summary    04 Jan 2024 07:01  -  05 Jan 2024 07:00  --------------------------------------------------------  IN: 2375 mL / OUT: 925 mL / NET: 1450 mL    05 Jan 2024 07:01  -  06 Jan 2024 01:19  --------------------------------------------------------  IN: 375 mL / OUT: 550 mL / NET: -175 mL          MEDICATIONS  (STANDING):  albuterol/ipratropium for Nebulization 3 milliLiter(s) Nebulizer every 12 hours  aspirin  chewable 81 milliGRAM(s) Oral daily  dextrose 5% + sodium chloride 0.45% with potassium chloride 20 mEq/L 1000 milliLiter(s) (75 mL/Hr) IV Continuous <Continuous>  dornase cierra Solution 2.5 milliGRAM(s) Inhalation every 12 hours  escitalopram 10 milliGRAM(s) Oral daily  gabapentin 100 milliGRAM(s) Oral two times a day  heparin   Injectable 5000 Unit(s) SubCutaneous every 8 hours  insulin glargine Injectable (LANTUS) 14 Unit(s) SubCutaneous at bedtime  insulin lispro (ADMELOG) corrective regimen sliding scale   SubCutaneous every 6 hours  levETIRAcetam   Injectable 250 milliGRAM(s) IV Push every 12 hours  memantine 5 milliGRAM(s) Oral two times a day  metoprolol tartrate Injectable 5 milliGRAM(s) IV Push every 6 hours  mometasone 110 MICROgram(s) Inhaler 2 Puff(s) Inhalation every 12 hours  pantoprazole  Injectable 40 milliGRAM(s) IV Push every 12 hours  ranolazine 500 milliGRAM(s) Oral two times a day  sucralfate 1 Gram(s) Oral every 12 hours  ticagrelor 60 milliGRAM(s) Enteral Tube every 12 hours    MEDICATIONS  (PRN):  acetaminophen   IVPB .. 1000 milliGRAM(s) IV Intermittent every 6 hours PRN Mild Pain (1 - 3), Moderate Pain (4 - 6)        LABS                                            9.2                   Neurophils% (auto):   x      (01-05 @ 22:19):    12.65)-----------(314          Lymphocytes% (auto):  x                                             28.8                   Eosinphils% (auto):   x        Manual%: Neutrophils x    ; Lymphocytes x    ; Eosinophils x    ; Bands%: x    ; Blasts x                                    142    |  111    |  22                  Calcium: 8.0   / iCa: x      (01-05 @ 22:19)    ----------------------------<  219       Magnesium: 1.90                             5.1     |  19     |  1.45             Phosphorous: 3.2 SICU Transfer Note    Transfer from: SICU  Transfer to: 3N  Accepting physician: Dr. Florentino    SICU COURSE: Patient is s/p SMA angiography with stent placement with diagnostic laparoscopy 12/24, small bowel and visible colon viable, some inflammation of omentum in RUQ. Course c/b GI bleed, s/p scope on 1/2 with GI with clips placed. Originally in the SICU for hemodynamic monitoring, now being transferred to the floor in clinically stable condition.    SUBJECTIVE: On the floor patient was tachycardic to 179/71, with 1 episode of dark melena, and self dc'd the KO feeding tube. Wrist restraints were ordered, and KO tube was attempted to be replaced. The patient did not tolerate the KO tube insertion and subsequently began to gag/cough, lips turning cyanotic, despite confirmation the tube was in the esophagus, visible in the back of the throat. Discussed with senior on call, decision was made to forgo placement of the feeding tube tonight due to risk of aspiration. Family at bedside.    PHYSICAL EXAM:  General: NAD, resting in bed comfortably.  Cardiac: regular rate, warm and well perfused  Respiratory: Nonlabored respirations, normal cw expansion. Secretions, 2L NC in place.  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 CTA demonstrating mesenteric fat stranding associated with ascending/transverse colon. S/p SMA angiography with stent placement with diagnostic laparoscopy 12/24, small bowel and visible colon viable, some inflammation of omentum in RUQ. Course c/b GI bleed, s/p scope on 1/2 with GI with clips placed. Patient transferred overnight from SICU to the floor in clinically stable condition.    PLAN:   - Repeat CBC for dark melena -> Hb stable (9.0->9.2)  - Diet: NPO 2/2 dysphagia  - consider repeat SLP vs cinesphogram  - Pain control PRN  - continue keppra  - acute cholecystitis s/p IV abx, tolerating abx.  - ASA and Brillinta resumed    C Team Surgery   [id number]      Vital Signs Last 24 Hrs  T(C): 36.5 (05 Jan 2024 21:17), Max: 37.4 (05 Jan 2024 12:00)  T(F): 97.7 (05 Jan 2024 21:17), Max: 99.4 (05 Jan 2024 12:00)  HR: 95 (05 Jan 2024 21:17) (80 - 98)  BP: 199/98 (05 Jan 2024 21:17) (148/68 - 199/98)  BP(mean): 118 (05 Jan 2024 16:00) (88 - 118)  RR: 20 (05 Jan 2024 21:17) (15 - 22)  SpO2: 98% (05 Jan 2024 21:17) (94% - 100%)    Parameters below as of 05 Jan 2024 21:17  Patient On (Oxygen Delivery Method): nasal cannula      I&O's Summary    04 Jan 2024 07:01  -  05 Jan 2024 07:00  --------------------------------------------------------  IN: 2375 mL / OUT: 925 mL / NET: 1450 mL    05 Jan 2024 07:01  -  06 Jan 2024 01:19  --------------------------------------------------------  IN: 375 mL / OUT: 550 mL / NET: -175 mL      MEDICATIONS  (STANDING):  albuterol/ipratropium for Nebulization 3 milliLiter(s) Nebulizer every 12 hours  aspirin  chewable 81 milliGRAM(s) Oral daily  dextrose 5% + sodium chloride 0.45% with potassium chloride 20 mEq/L 1000 milliLiter(s) (75 mL/Hr) IV Continuous <Continuous>  dornase cierra Solution 2.5 milliGRAM(s) Inhalation every 12 hours  escitalopram 10 milliGRAM(s) Oral daily  gabapentin 100 milliGRAM(s) Oral two times a day  heparin   Injectable 5000 Unit(s) SubCutaneous every 8 hours  insulin glargine Injectable (LANTUS) 14 Unit(s) SubCutaneous at bedtime  insulin lispro (ADMELOG) corrective regimen sliding scale   SubCutaneous every 6 hours  levETIRAcetam   Injectable 250 milliGRAM(s) IV Push every 12 hours  memantine 5 milliGRAM(s) Oral two times a day  metoprolol tartrate Injectable 5 milliGRAM(s) IV Push every 6 hours  mometasone 110 MICROgram(s) Inhaler 2 Puff(s) Inhalation every 12 hours  pantoprazole  Injectable 40 milliGRAM(s) IV Push every 12 hours  ranolazine 500 milliGRAM(s) Oral two times a day  sucralfate 1 Gram(s) Oral every 12 hours  ticagrelor 60 milliGRAM(s) Enteral Tube every 12 hours    MEDICATIONS  (PRN):  acetaminophen   IVPB .. 1000 milliGRAM(s) IV Intermittent every 6 hours PRN Mild Pain (1 - 3), Moderate Pain (4 - 6)        LABS                                            9.2                   Neurophils% (auto):   x      (01-05 @ 22:19):    12.65)-----------(314          Lymphocytes% (auto):  x                                             28.8                   Eosinphils% (auto):   x        Manual%: Neutrophils x    ; Lymphocytes x    ; Eosinophils x    ; Bands%: x    ; Blasts x                                    142    |  111    |  22                  Calcium: 8.0   / iCa: x      (01-05 @ 22:19)    ----------------------------<  219       Magnesium: 1.90                             5.1     |  19     |  1.45             Phosphorous: 3.2 SICU Transfer Note    Transfer from: SICU  Transfer to: 3N  Accepting physician: Dr. Florentino    SICU COURSE: Patient is s/p SMA angiography with stent placement with diagnostic laparoscopy 12/24, small bowel and visible colon viable, some inflammation of omentum in RUQ. Course c/b GI bleed, s/p scope on 1/2 with GI with clips placed. Originally in the SICU for hemodynamic monitoring, now being transferred to the floor in clinically stable condition.    SUBJECTIVE: On the floor patient was tachycardic to 179/71, with 1 episode of dark melena, and self dc'd the KO feeding tube. Wrist restraints were ordered, and KO tube was attempted to be replaced. The patient did not tolerate the KO tube insertion and subsequently began to gag/cough, lips turning cyanotic, despite confirmation the tube was in the esophagus, visible in the back of the throat. Discussed with senior on call, decision was made to forgo placement of the feeding tube tonight due to risk of aspiration. Family at bedside.    PHYSICAL EXAM:  General: NAD, resting in bed comfortably.  Cardiac: regular rate, warm and well perfused  Respiratory: Nonlabored respirations, normal cw expansion. Secretions, 2L NC in place.  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 CTA demonstrating mesenteric fat stranding associated with ascending/transverse colon. S/p SMA angiography with stent placement with diagnostic laparoscopy 12/24, small bowel and visible colon viable, some inflammation of omentum in RUQ. Course c/b GI bleed, s/p scope on 1/2 with GI with clips placed. Patient transferred overnight from SICU to the floor in clinically stable condition.    PLAN:   - Repeat CBC for dark melena -> Hb stable (9.0->9.2)  - Diet: NPO 2/2 dysphagia  - consider repeat SLP vs cinesphogram  - Pain control PRN  - continue keppra  - acute cholecystitis s/p IV abx, tolerating abx.  - ASA and Brillinta resumed    C Team Surgery   [id number]      Vital Signs Last 24 Hrs  T(C): 36.5 (05 Jan 2024 21:17), Max: 37.4 (05 Jan 2024 12:00)  T(F): 97.7 (05 Jan 2024 21:17), Max: 99.4 (05 Jan 2024 12:00)  HR: 95 (05 Jan 2024 21:17) (80 - 98)  BP: 199/98 (05 Jan 2024 21:17) (148/68 - 199/98)  BP(mean): 118 (05 Jan 2024 16:00) (88 - 118)  RR: 20 (05 Jan 2024 21:17) (15 - 22)  SpO2: 98% (05 Jan 2024 21:17) (94% - 100%)    Parameters below as of 05 Jan 2024 21:17  Patient On (Oxygen Delivery Method): nasal cannula      I&O's Summary    04 Jan 2024 07:01  -  05 Jan 2024 07:00  --------------------------------------------------------  IN: 2375 mL / OUT: 925 mL / NET: 1450 mL    05 Jan 2024 07:01  -  06 Jan 2024 01:19  --------------------------------------------------------  IN: 375 mL / OUT: 550 mL / NET: -175 mL      MEDICATIONS  (STANDING):  albuterol/ipratropium for Nebulization 3 milliLiter(s) Nebulizer every 12 hours  aspirin  chewable 81 milliGRAM(s) Oral daily  dextrose 5% + sodium chloride 0.45% with potassium chloride 20 mEq/L 1000 milliLiter(s) (75 mL/Hr) IV Continuous <Continuous>  dornase cierra Solution 2.5 milliGRAM(s) Inhalation every 12 hours  escitalopram 10 milliGRAM(s) Oral daily  gabapentin 100 milliGRAM(s) Oral two times a day  heparin   Injectable 5000 Unit(s) SubCutaneous every 8 hours  insulin glargine Injectable (LANTUS) 14 Unit(s) SubCutaneous at bedtime  insulin lispro (ADMELOG) corrective regimen sliding scale   SubCutaneous every 6 hours  levETIRAcetam   Injectable 250 milliGRAM(s) IV Push every 12 hours  memantine 5 milliGRAM(s) Oral two times a day  metoprolol tartrate Injectable 5 milliGRAM(s) IV Push every 6 hours  mometasone 110 MICROgram(s) Inhaler 2 Puff(s) Inhalation every 12 hours  pantoprazole  Injectable 40 milliGRAM(s) IV Push every 12 hours  ranolazine 500 milliGRAM(s) Oral two times a day  sucralfate 1 Gram(s) Oral every 12 hours  ticagrelor 60 milliGRAM(s) Enteral Tube every 12 hours    MEDICATIONS  (PRN):  acetaminophen   IVPB .. 1000 milliGRAM(s) IV Intermittent every 6 hours PRN Mild Pain (1 - 3), Moderate Pain (4 - 6)        LABS                                            9.2                   Neurophils% (auto):   x      (01-05 @ 22:19):    12.65)-----------(314          Lymphocytes% (auto):  x                                             28.8                   Eosinphils% (auto):   x        Manual%: Neutrophils x    ; Lymphocytes x    ; Eosinophils x    ; Bands%: x    ; Blasts x                                    142    |  111    |  22                  Calcium: 8.0   / iCa: x      (01-05 @ 22:19)    ----------------------------<  219       Magnesium: 1.90                             5.1     |  19     |  1.45             Phosphorous: 3.2 SICU Transfer Note    Transfer from: SICU  Transfer to: 3N  Accepting physician: Dr. Florentino    SICU COURSE: Patient is s/p SMA angiography with stent placement with diagnostic laparoscopy 12/24, small bowel and visible colon viable, some inflammation of omentum in RUQ. Course c/b GI bleed, s/p scope on 1/2 with GI with clips placed. Originally in the SICU for hemodynamic monitoring, now being transferred to the floor in clinically stable condition.    SUBJECTIVE: On the floor patient was hypertension to 179/71, with 1 episode of dark melena, and self dc'd the KO feeding tube. Wrist restraints were ordered, and KO tube was attempted to be replaced. The patient did not tolerate the KO tube insertion and subsequently began to gag/cough, lips turning cyanotic, despite confirmation the tube was in the esophagus, visible in the back of the throat. Discussed with senior on call, decision was made to forgo placement of the feeding tube tonight due to risk of aspiration. Family at bedside.    PHYSICAL EXAM:  General: NAD, resting in bed comfortably.  Cardiac: regular rate, warm and well perfused  Respiratory: Nonlabored respirations, normal cw expansion. Secretions, 2L NC in place.  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 CTA demonstrating mesenteric fat stranding associated with ascending/transverse colon. S/p SMA angiography with stent placement with diagnostic laparoscopy 12/24, small bowel and visible colon viable, some inflammation of omentum in RUQ. Course c/b GI bleed, s/p scope on 1/2 with GI with clips placed. Patient transferred overnight from SICU to the floor in clinically stable condition.    PLAN:   - Repeat CBC for dark melena -> Hb stable (9.0->9.2)  - Diet: NPO 2/2 dysphagia  - consider repeat SLP vs cinesphogram  - Pain control PRN  - continue keppra  - acute cholecystitis s/p IV abx, tolerating abx.  - ASA and Brillinta resumed    C Team Surgery   [id number]      Vital Signs Last 24 Hrs  T(C): 36.5 (05 Jan 2024 21:17), Max: 37.4 (05 Jan 2024 12:00)  T(F): 97.7 (05 Jan 2024 21:17), Max: 99.4 (05 Jan 2024 12:00)  HR: 95 (05 Jan 2024 21:17) (80 - 98)  BP: 199/98 (05 Jan 2024 21:17) (148/68 - 199/98)  BP(mean): 118 (05 Jan 2024 16:00) (88 - 118)  RR: 20 (05 Jan 2024 21:17) (15 - 22)  SpO2: 98% (05 Jan 2024 21:17) (94% - 100%)    Parameters below as of 05 Jan 2024 21:17  Patient On (Oxygen Delivery Method): nasal cannula      I&O's Summary    04 Jan 2024 07:01  -  05 Jan 2024 07:00  --------------------------------------------------------  IN: 2375 mL / OUT: 925 mL / NET: 1450 mL    05 Jan 2024 07:01  -  06 Jan 2024 01:19  --------------------------------------------------------  IN: 375 mL / OUT: 550 mL / NET: -175 mL      MEDICATIONS  (STANDING):  albuterol/ipratropium for Nebulization 3 milliLiter(s) Nebulizer every 12 hours  aspirin  chewable 81 milliGRAM(s) Oral daily  dextrose 5% + sodium chloride 0.45% with potassium chloride 20 mEq/L 1000 milliLiter(s) (75 mL/Hr) IV Continuous <Continuous>  dornase cierra Solution 2.5 milliGRAM(s) Inhalation every 12 hours  escitalopram 10 milliGRAM(s) Oral daily  gabapentin 100 milliGRAM(s) Oral two times a day  heparin   Injectable 5000 Unit(s) SubCutaneous every 8 hours  insulin glargine Injectable (LANTUS) 14 Unit(s) SubCutaneous at bedtime  insulin lispro (ADMELOG) corrective regimen sliding scale   SubCutaneous every 6 hours  levETIRAcetam   Injectable 250 milliGRAM(s) IV Push every 12 hours  memantine 5 milliGRAM(s) Oral two times a day  metoprolol tartrate Injectable 5 milliGRAM(s) IV Push every 6 hours  mometasone 110 MICROgram(s) Inhaler 2 Puff(s) Inhalation every 12 hours  pantoprazole  Injectable 40 milliGRAM(s) IV Push every 12 hours  ranolazine 500 milliGRAM(s) Oral two times a day  sucralfate 1 Gram(s) Oral every 12 hours  ticagrelor 60 milliGRAM(s) Enteral Tube every 12 hours    MEDICATIONS  (PRN):  acetaminophen   IVPB .. 1000 milliGRAM(s) IV Intermittent every 6 hours PRN Mild Pain (1 - 3), Moderate Pain (4 - 6)        LABS                                            9.2                   Neurophils% (auto):   x      (01-05 @ 22:19):    12.65)-----------(314          Lymphocytes% (auto):  x                                             28.8                   Eosinphils% (auto):   x        Manual%: Neutrophils x    ; Lymphocytes x    ; Eosinophils x    ; Bands%: x    ; Blasts x                                    142    |  111    |  22                  Calcium: 8.0   / iCa: x      (01-05 @ 22:19)    ----------------------------<  219       Magnesium: 1.90                             5.1     |  19     |  1.45             Phosphorous: 3.2 SICU Transfer Note    Transfer from: SICU  Transfer to: 3N  Accepting physician: Dr. Florentino    SICU COURSE: Patient is s/p SMA angiography with stent placement with diagnostic laparoscopy 12/24, small bowel and visible colon viable, some inflammation of omentum in RUQ. Course c/b GI bleed, s/p scope on 1/2 with GI with clips placed. Originally in the SICU for hemodynamic monitoring, now being transferred to the floor in clinically stable condition.    SUBJECTIVE: On the floor patient was hypertension to 179/71, with 1 episode of dark melena, and self dc'd the KO feeding tube. Wrist restraints were ordered, and KO tube was attempted to be replaced. The patient did not tolerate the KO tube insertion and subsequently began to gag/cough, lips turning cyanotic, despite confirmation the tube was in the esophagus, visible in the back of the throat. Discussed with senior on call, decision was made to forgo placement of the feeding tube tonight due to risk of aspiration. Family at bedside.    PHYSICAL EXAM:  General: NAD, resting in bed comfortably.  Cardiac: regular rate, warm and well perfused  Respiratory: Nonlabored respirations, normal cw expansion. Secretions, 2L NC in place.  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 CTA demonstrating mesenteric fat stranding associated with ascending/transverse colon. S/p SMA angiography with stent placement with diagnostic laparoscopy 12/24, small bowel and visible colon viable, some inflammation of omentum in RUQ. Course c/b GI bleed, s/p scope on 1/2 with GI with clips placed. Patient transferred overnight from SICU to the floor in clinically stable condition.    PLAN:   - Repeat CBC for dark melena -> Hb stable (9.0->9.2)  - Diet: NPO 2/2 dysphagia  - consider repeat SLP vs cinesphogram  - Pain control PRN  - continue keppra  - acute cholecystitis s/p IV abx, tolerating abx.  - ASA and Brillinta resumed    C Team Surgery   [id number]      Vital Signs Last 24 Hrs  T(C): 36.5 (05 Jan 2024 21:17), Max: 37.4 (05 Jan 2024 12:00)  T(F): 97.7 (05 Jan 2024 21:17), Max: 99.4 (05 Jan 2024 12:00)  HR: 95 (05 Jan 2024 21:17) (80 - 98)  BP: 199/98 (05 Jan 2024 21:17) (148/68 - 199/98)  BP(mean): 118 (05 Jan 2024 16:00) (88 - 118)  RR: 20 (05 Jan 2024 21:17) (15 - 22)  SpO2: 98% (05 Jan 2024 21:17) (94% - 100%)    Parameters below as of 05 Jan 2024 21:17  Patient On (Oxygen Delivery Method): nasal cannula      I&O's Summary    04 Jan 2024 07:01  -  05 Jan 2024 07:00  --------------------------------------------------------  IN: 2375 mL / OUT: 925 mL / NET: 1450 mL    05 Jan 2024 07:01  -  06 Jan 2024 01:19  --------------------------------------------------------  IN: 375 mL / OUT: 550 mL / NET: -175 mL      MEDICATIONS  (STANDING):  albuterol/ipratropium for Nebulization 3 milliLiter(s) Nebulizer every 12 hours  aspirin  chewable 81 milliGRAM(s) Oral daily  dextrose 5% + sodium chloride 0.45% with potassium chloride 20 mEq/L 1000 milliLiter(s) (75 mL/Hr) IV Continuous <Continuous>  dornase cierra Solution 2.5 milliGRAM(s) Inhalation every 12 hours  escitalopram 10 milliGRAM(s) Oral daily  gabapentin 100 milliGRAM(s) Oral two times a day  heparin   Injectable 5000 Unit(s) SubCutaneous every 8 hours  insulin glargine Injectable (LANTUS) 14 Unit(s) SubCutaneous at bedtime  insulin lispro (ADMELOG) corrective regimen sliding scale   SubCutaneous every 6 hours  levETIRAcetam   Injectable 250 milliGRAM(s) IV Push every 12 hours  memantine 5 milliGRAM(s) Oral two times a day  metoprolol tartrate Injectable 5 milliGRAM(s) IV Push every 6 hours  mometasone 110 MICROgram(s) Inhaler 2 Puff(s) Inhalation every 12 hours  pantoprazole  Injectable 40 milliGRAM(s) IV Push every 12 hours  ranolazine 500 milliGRAM(s) Oral two times a day  sucralfate 1 Gram(s) Oral every 12 hours  ticagrelor 60 milliGRAM(s) Enteral Tube every 12 hours    MEDICATIONS  (PRN):  acetaminophen   IVPB .. 1000 milliGRAM(s) IV Intermittent every 6 hours PRN Mild Pain (1 - 3), Moderate Pain (4 - 6)        LABS                                            9.2                   Neurophils% (auto):   x      (01-05 @ 22:19):    12.65)-----------(314          Lymphocytes% (auto):  x                                             28.8                   Eosinphils% (auto):   x        Manual%: Neutrophils x    ; Lymphocytes x    ; Eosinophils x    ; Bands%: x    ; Blasts x                                    142    |  111    |  22                  Calcium: 8.0   / iCa: x      (01-05 @ 22:19)    ----------------------------<  219       Magnesium: 1.90                             5.1     |  19     |  1.45             Phosphorous: 3.2 SICU Transfer Note    Transfer from: SICU  Transfer to: 3N  Accepting physician: Dr. Florentino    SICU COURSE: Patient is s/p SMA angiography with stent placement with diagnostic laparoscopy 12/24, small bowel and visible colon viable, some inflammation of omentum in RUQ. Course c/b GI bleed, s/p scope on 1/2 with GI with clips placed. Originally in the SICU for hemodynamic monitoring, now being transferred to the floor in clinically stable condition.    SUBJECTIVE: On the floor patient was hypertensive to 179/71, with 1 episode of dark melena, and self dc'd the KO feeding tube. Wrist restraints were ordered, and KO tube was attempted to be replaced. The patient did not tolerate the KO tube insertion and subsequently began to gag/cough, lips turning cyanotic, despite confirmation the tube was in the esophagus, visible in the back of the throat. Discussed with senior on call, decision was made to forgo placement of the feeding tube tonight due to risk of aspiration. Family at bedside.    PHYSICAL EXAM:  General: NAD, resting in bed comfortably.  Cardiac: regular rate, warm and well perfused  Respiratory: Nonlabored respirations, normal cw expansion. Secretions, 2L NC in place.  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 CTA demonstrating mesenteric fat stranding associated with ascending/transverse colon. S/p SMA angiography with stent placement with diagnostic laparoscopy 12/24, small bowel and visible colon viable, some inflammation of omentum in RUQ. Course c/b GI bleed, s/p scope on 1/2 with GI with clips placed. Patient transferred overnight from SICU to the floor in clinically stable condition.    PLAN:   - Repeat CBC for dark melena -> Hb stable (9.0->9.2)  - Diet: NPO 2/2 dysphagia  - consider repeat SLP vs cinesphogram  - Pain control PRN  - continue keppra  - acute cholecystitis s/p IV abx, tolerating abx.  - ASA and Brillinta resumed    C Team Surgery   [id number]      Vital Signs Last 24 Hrs  T(C): 36.5 (05 Jan 2024 21:17), Max: 37.4 (05 Jan 2024 12:00)  T(F): 97.7 (05 Jan 2024 21:17), Max: 99.4 (05 Jan 2024 12:00)  HR: 95 (05 Jan 2024 21:17) (80 - 98)  BP: 199/98 (05 Jan 2024 21:17) (148/68 - 199/98)  BP(mean): 118 (05 Jan 2024 16:00) (88 - 118)  RR: 20 (05 Jan 2024 21:17) (15 - 22)  SpO2: 98% (05 Jan 2024 21:17) (94% - 100%)    Parameters below as of 05 Jan 2024 21:17  Patient On (Oxygen Delivery Method): nasal cannula      I&O's Summary    04 Jan 2024 07:01  -  05 Jan 2024 07:00  --------------------------------------------------------  IN: 2375 mL / OUT: 925 mL / NET: 1450 mL    05 Jan 2024 07:01  -  06 Jan 2024 01:19  --------------------------------------------------------  IN: 375 mL / OUT: 550 mL / NET: -175 mL      MEDICATIONS  (STANDING):  albuterol/ipratropium for Nebulization 3 milliLiter(s) Nebulizer every 12 hours  aspirin  chewable 81 milliGRAM(s) Oral daily  dextrose 5% + sodium chloride 0.45% with potassium chloride 20 mEq/L 1000 milliLiter(s) (75 mL/Hr) IV Continuous <Continuous>  dornase cierra Solution 2.5 milliGRAM(s) Inhalation every 12 hours  escitalopram 10 milliGRAM(s) Oral daily  gabapentin 100 milliGRAM(s) Oral two times a day  heparin   Injectable 5000 Unit(s) SubCutaneous every 8 hours  insulin glargine Injectable (LANTUS) 14 Unit(s) SubCutaneous at bedtime  insulin lispro (ADMELOG) corrective regimen sliding scale   SubCutaneous every 6 hours  levETIRAcetam   Injectable 250 milliGRAM(s) IV Push every 12 hours  memantine 5 milliGRAM(s) Oral two times a day  metoprolol tartrate Injectable 5 milliGRAM(s) IV Push every 6 hours  mometasone 110 MICROgram(s) Inhaler 2 Puff(s) Inhalation every 12 hours  pantoprazole  Injectable 40 milliGRAM(s) IV Push every 12 hours  ranolazine 500 milliGRAM(s) Oral two times a day  sucralfate 1 Gram(s) Oral every 12 hours  ticagrelor 60 milliGRAM(s) Enteral Tube every 12 hours    MEDICATIONS  (PRN):  acetaminophen   IVPB .. 1000 milliGRAM(s) IV Intermittent every 6 hours PRN Mild Pain (1 - 3), Moderate Pain (4 - 6)        LABS                                            9.2                   Neurophils% (auto):   x      (01-05 @ 22:19):    12.65)-----------(314          Lymphocytes% (auto):  x                                             28.8                   Eosinphils% (auto):   x        Manual%: Neutrophils x    ; Lymphocytes x    ; Eosinophils x    ; Bands%: x    ; Blasts x                                    142    |  111    |  22                  Calcium: 8.0   / iCa: x      (01-05 @ 22:19)    ----------------------------<  219       Magnesium: 1.90                             5.1     |  19     |  1.45             Phosphorous: 3.2 SICU Transfer Note    Transfer from: SICU  Transfer to: 3N  Accepting physician: Dr. Florentino    SICU COURSE: Patient is s/p SMA angiography with stent placement with diagnostic laparoscopy 12/24, small bowel and visible colon viable, some inflammation of omentum in RUQ. Course c/b GI bleed, s/p scope on 1/2 with GI with clips placed. Originally in the SICU for hemodynamic monitoring, now being transferred to the floor in clinically stable condition.    SUBJECTIVE: On the floor patient was hypertensive to 179/71, with 1 episode of dark melena, and self dc'd the KO feeding tube. Wrist restraints were ordered, and KO tube was attempted to be replaced. The patient did not tolerate the KO tube insertion and subsequently began to gag/cough, lips turning cyanotic, despite confirmation the tube was in the esophagus, visible in the back of the throat. Discussed with senior on call, decision was made to forgo placement of the feeding tube tonight due to risk of aspiration. Family at bedside.    PHYSICAL EXAM:  General: NAD, resting in bed comfortably.  Cardiac: regular rate, warm and well perfused  Respiratory: Nonlabored respirations, normal cw expansion. Secretions, 2L NC in place.  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 CTA demonstrating mesenteric fat stranding associated with ascending/transverse colon. S/p SMA angiography with stent placement with diagnostic laparoscopy 12/24, small bowel and visible colon viable, some inflammation of omentum in RUQ. Course c/b GI bleed, s/p scope on 1/2 with GI with clips placed. Patient transferred overnight from SICU to the floor in clinically stable condition.    PLAN:   - Repeat CBC for dark melena -> Hb stable (9.0->9.2)  - Diet: NPO 2/2 dysphagia  - consider repeat SLP vs cinesphogram  - Pain control PRN  - continue keppra  - acute cholecystitis s/p IV abx, tolerating abx.  - ASA and Brillinta resumed    C Team Surgery   [id number]      Vital Signs Last 24 Hrs  T(C): 36.5 (05 Jan 2024 21:17), Max: 37.4 (05 Jan 2024 12:00)  T(F): 97.7 (05 Jan 2024 21:17), Max: 99.4 (05 Jan 2024 12:00)  HR: 95 (05 Jan 2024 21:17) (80 - 98)  BP: 199/98 (05 Jan 2024 21:17) (148/68 - 199/98)  BP(mean): 118 (05 Jan 2024 16:00) (88 - 118)  RR: 20 (05 Jan 2024 21:17) (15 - 22)  SpO2: 98% (05 Jan 2024 21:17) (94% - 100%)    Parameters below as of 05 Jan 2024 21:17  Patient On (Oxygen Delivery Method): nasal cannula      I&O's Summary    04 Jan 2024 07:01  -  05 Jan 2024 07:00  --------------------------------------------------------  IN: 2375 mL / OUT: 925 mL / NET: 1450 mL    05 Jan 2024 07:01  -  06 Jan 2024 01:19  --------------------------------------------------------  IN: 375 mL / OUT: 550 mL / NET: -175 mL      MEDICATIONS  (STANDING):  albuterol/ipratropium for Nebulization 3 milliLiter(s) Nebulizer every 12 hours  aspirin  chewable 81 milliGRAM(s) Oral daily  dextrose 5% + sodium chloride 0.45% with potassium chloride 20 mEq/L 1000 milliLiter(s) (75 mL/Hr) IV Continuous <Continuous>  dornase cierra Solution 2.5 milliGRAM(s) Inhalation every 12 hours  escitalopram 10 milliGRAM(s) Oral daily  gabapentin 100 milliGRAM(s) Oral two times a day  heparin   Injectable 5000 Unit(s) SubCutaneous every 8 hours  insulin glargine Injectable (LANTUS) 14 Unit(s) SubCutaneous at bedtime  insulin lispro (ADMELOG) corrective regimen sliding scale   SubCutaneous every 6 hours  levETIRAcetam   Injectable 250 milliGRAM(s) IV Push every 12 hours  memantine 5 milliGRAM(s) Oral two times a day  metoprolol tartrate Injectable 5 milliGRAM(s) IV Push every 6 hours  mometasone 110 MICROgram(s) Inhaler 2 Puff(s) Inhalation every 12 hours  pantoprazole  Injectable 40 milliGRAM(s) IV Push every 12 hours  ranolazine 500 milliGRAM(s) Oral two times a day  sucralfate 1 Gram(s) Oral every 12 hours  ticagrelor 60 milliGRAM(s) Enteral Tube every 12 hours    MEDICATIONS  (PRN):  acetaminophen   IVPB .. 1000 milliGRAM(s) IV Intermittent every 6 hours PRN Mild Pain (1 - 3), Moderate Pain (4 - 6)        LABS                                            9.2                   Neurophils% (auto):   x      (01-05 @ 22:19):    12.65)-----------(314          Lymphocytes% (auto):  x                                             28.8                   Eosinphils% (auto):   x        Manual%: Neutrophils x    ; Lymphocytes x    ; Eosinophils x    ; Bands%: x    ; Blasts x                                    142    |  111    |  22                  Calcium: 8.0   / iCa: x      (01-05 @ 22:19)    ----------------------------<  219       Magnesium: 1.90                             5.1     |  19     |  1.45             Phosphorous: 3.2

## 2024-01-06 NOTE — CONSULT NOTE ADULT - PROBLEM SELECTOR RECOMMENDATION 5
On PPI.  S/P EGD.   Impression:          - NG tube removed before endoscopy                       - LA Grade B esophagitis.                       - Bleeding Dieulafoy's lesion of the posterior wall of                        the gastric body. This is likely the source of                        clinically significant bleeding. Hemostasis achieved                        using 2 MR conditional hemoclips.                       - Bleeding edematous mucosa and nonbleeding clean based                        gastric ulcers in the posterior wall of the gastric                        body. These are likely due to NG tube irritation.                        Hemostasis achieved using 1 MR conditional hemoclip.                       - Non-bleeding small clean based duodenal ulcer                       - No specimens collected.    < end of copied text >

## 2024-01-07 LAB
ANION GAP SERPL CALC-SCNC: 9 MMOL/L — SIGNIFICANT CHANGE UP (ref 7–14)
ANION GAP SERPL CALC-SCNC: 9 MMOL/L — SIGNIFICANT CHANGE UP (ref 7–14)
BUN SERPL-MCNC: 21 MG/DL — SIGNIFICANT CHANGE UP (ref 7–23)
BUN SERPL-MCNC: 21 MG/DL — SIGNIFICANT CHANGE UP (ref 7–23)
CALCIUM SERPL-MCNC: 7.8 MG/DL — LOW (ref 8.4–10.5)
CALCIUM SERPL-MCNC: 7.8 MG/DL — LOW (ref 8.4–10.5)
CHLORIDE SERPL-SCNC: 109 MMOL/L — HIGH (ref 98–107)
CHLORIDE SERPL-SCNC: 109 MMOL/L — HIGH (ref 98–107)
CO2 SERPL-SCNC: 19 MMOL/L — LOW (ref 22–31)
CO2 SERPL-SCNC: 19 MMOL/L — LOW (ref 22–31)
CREAT SERPL-MCNC: 1.35 MG/DL — HIGH (ref 0.5–1.3)
CREAT SERPL-MCNC: 1.35 MG/DL — HIGH (ref 0.5–1.3)
EGFR: 50 ML/MIN/1.73M2 — LOW
EGFR: 50 ML/MIN/1.73M2 — LOW
GLUCOSE SERPL-MCNC: 301 MG/DL — HIGH (ref 70–99)
GLUCOSE SERPL-MCNC: 301 MG/DL — HIGH (ref 70–99)
HCT VFR BLD CALC: 28.6 % — LOW (ref 39–50)
HCT VFR BLD CALC: 28.6 % — LOW (ref 39–50)
HGB BLD-MCNC: 9.2 G/DL — LOW (ref 13–17)
HGB BLD-MCNC: 9.2 G/DL — LOW (ref 13–17)
MAGNESIUM SERPL-MCNC: 2.2 MG/DL — SIGNIFICANT CHANGE UP (ref 1.6–2.6)
MAGNESIUM SERPL-MCNC: 2.2 MG/DL — SIGNIFICANT CHANGE UP (ref 1.6–2.6)
MCHC RBC-ENTMCNC: 30.9 PG — SIGNIFICANT CHANGE UP (ref 27–34)
MCHC RBC-ENTMCNC: 30.9 PG — SIGNIFICANT CHANGE UP (ref 27–34)
MCHC RBC-ENTMCNC: 32.2 GM/DL — SIGNIFICANT CHANGE UP (ref 32–36)
MCHC RBC-ENTMCNC: 32.2 GM/DL — SIGNIFICANT CHANGE UP (ref 32–36)
MCV RBC AUTO: 96 FL — SIGNIFICANT CHANGE UP (ref 80–100)
MCV RBC AUTO: 96 FL — SIGNIFICANT CHANGE UP (ref 80–100)
NRBC # BLD: 0 /100 WBCS — SIGNIFICANT CHANGE UP (ref 0–0)
NRBC # BLD: 0 /100 WBCS — SIGNIFICANT CHANGE UP (ref 0–0)
NRBC # FLD: 0 K/UL — SIGNIFICANT CHANGE UP (ref 0–0)
NRBC # FLD: 0 K/UL — SIGNIFICANT CHANGE UP (ref 0–0)
PHOSPHATE SERPL-MCNC: 2.2 MG/DL — LOW (ref 2.5–4.5)
PHOSPHATE SERPL-MCNC: 2.2 MG/DL — LOW (ref 2.5–4.5)
PLATELET # BLD AUTO: 312 K/UL — SIGNIFICANT CHANGE UP (ref 150–400)
PLATELET # BLD AUTO: 312 K/UL — SIGNIFICANT CHANGE UP (ref 150–400)
POTASSIUM SERPL-MCNC: 5.2 MMOL/L — SIGNIFICANT CHANGE UP (ref 3.5–5.3)
POTASSIUM SERPL-MCNC: 5.2 MMOL/L — SIGNIFICANT CHANGE UP (ref 3.5–5.3)
POTASSIUM SERPL-SCNC: 5.2 MMOL/L — SIGNIFICANT CHANGE UP (ref 3.5–5.3)
POTASSIUM SERPL-SCNC: 5.2 MMOL/L — SIGNIFICANT CHANGE UP (ref 3.5–5.3)
RBC # BLD: 2.98 M/UL — LOW (ref 4.2–5.8)
RBC # BLD: 2.98 M/UL — LOW (ref 4.2–5.8)
RBC # FLD: 21.2 % — HIGH (ref 10.3–14.5)
RBC # FLD: 21.2 % — HIGH (ref 10.3–14.5)
SODIUM SERPL-SCNC: 137 MMOL/L — SIGNIFICANT CHANGE UP (ref 135–145)
SODIUM SERPL-SCNC: 137 MMOL/L — SIGNIFICANT CHANGE UP (ref 135–145)
WBC # BLD: 10 K/UL — SIGNIFICANT CHANGE UP (ref 3.8–10.5)
WBC # BLD: 10 K/UL — SIGNIFICANT CHANGE UP (ref 3.8–10.5)
WBC # FLD AUTO: 10 K/UL — SIGNIFICANT CHANGE UP (ref 3.8–10.5)
WBC # FLD AUTO: 10 K/UL — SIGNIFICANT CHANGE UP (ref 3.8–10.5)

## 2024-01-07 RX ORDER — LANOLIN ALCOHOL/MO/W.PET/CERES
3 CREAM (GRAM) TOPICAL AT BEDTIME
Refills: 0 | Status: DISCONTINUED | OUTPATIENT
Start: 2024-01-07 | End: 2024-01-19

## 2024-01-07 RX ORDER — LANOLIN ALCOHOL/MO/W.PET/CERES
1 CREAM (GRAM) TOPICAL AT BEDTIME
Refills: 0 | Status: DISCONTINUED | OUTPATIENT
Start: 2024-01-07 | End: 2024-01-07

## 2024-01-07 RX ORDER — DEXTROSE MONOHYDRATE, SODIUM CHLORIDE, AND POTASSIUM CHLORIDE 50; .745; 4.5 G/1000ML; G/1000ML; G/1000ML
1000 INJECTION, SOLUTION INTRAVENOUS
Refills: 0 | Status: DISCONTINUED | OUTPATIENT
Start: 2024-01-07 | End: 2024-01-08

## 2024-01-07 RX ADMIN — RANOLAZINE 500 MILLIGRAM(S): 500 TABLET, FILM COATED, EXTENDED RELEASE ORAL at 18:48

## 2024-01-07 RX ADMIN — TICAGRELOR 60 MILLIGRAM(S): 90 TABLET ORAL at 18:48

## 2024-01-07 RX ADMIN — Medication 5 MILLIGRAM(S): at 01:39

## 2024-01-07 RX ADMIN — Medication 81 MILLIGRAM(S): at 11:33

## 2024-01-07 RX ADMIN — Medication 3 MILLIGRAM(S): at 22:50

## 2024-01-07 RX ADMIN — Medication 3 MILLILITER(S): at 21:35

## 2024-01-07 RX ADMIN — MOMETASONE FUROATE 2 PUFF(S): 220 INHALANT RESPIRATORY (INHALATION) at 11:30

## 2024-01-07 RX ADMIN — Medication 5 MILLIGRAM(S): at 18:47

## 2024-01-07 RX ADMIN — Medication 400 MILLIGRAM(S): at 15:34

## 2024-01-07 RX ADMIN — Medication 3 MILLIGRAM(S): at 03:13

## 2024-01-07 RX ADMIN — DEXTROSE MONOHYDRATE, SODIUM CHLORIDE, AND POTASSIUM CHLORIDE 75 MILLILITER(S): 50; .745; 4.5 INJECTION, SOLUTION INTRAVENOUS at 08:08

## 2024-01-07 RX ADMIN — MEMANTINE HYDROCHLORIDE 5 MILLIGRAM(S): 10 TABLET ORAL at 18:48

## 2024-01-07 RX ADMIN — ESCITALOPRAM OXALATE 10 MILLIGRAM(S): 10 TABLET, FILM COATED ORAL at 11:33

## 2024-01-07 RX ADMIN — HEPARIN SODIUM 5000 UNIT(S): 5000 INJECTION INTRAVENOUS; SUBCUTANEOUS at 13:03

## 2024-01-07 RX ADMIN — HEPARIN SODIUM 5000 UNIT(S): 5000 INJECTION INTRAVENOUS; SUBCUTANEOUS at 22:50

## 2024-01-07 RX ADMIN — GABAPENTIN 100 MILLIGRAM(S): 400 CAPSULE ORAL at 05:55

## 2024-01-07 RX ADMIN — LEVETIRACETAM 250 MILLIGRAM(S): 250 TABLET, FILM COATED ORAL at 05:52

## 2024-01-07 RX ADMIN — Medication 1000 MILLIGRAM(S): at 16:34

## 2024-01-07 RX ADMIN — Medication 85 MILLIMOLE(S): at 11:29

## 2024-01-07 RX ADMIN — PANTOPRAZOLE SODIUM 40 MILLIGRAM(S): 20 TABLET, DELAYED RELEASE ORAL at 05:51

## 2024-01-07 RX ADMIN — Medication 1 PACKET(S): at 05:54

## 2024-01-07 RX ADMIN — HEPARIN SODIUM 5000 UNIT(S): 5000 INJECTION INTRAVENOUS; SUBCUTANEOUS at 05:52

## 2024-01-07 RX ADMIN — Medication 6: at 18:47

## 2024-01-07 RX ADMIN — Medication 6: at 06:11

## 2024-01-07 RX ADMIN — TICAGRELOR 60 MILLIGRAM(S): 90 TABLET ORAL at 05:54

## 2024-01-07 RX ADMIN — GABAPENTIN 100 MILLIGRAM(S): 400 CAPSULE ORAL at 18:46

## 2024-01-07 RX ADMIN — AMLODIPINE BESYLATE 5 MILLIGRAM(S): 2.5 TABLET ORAL at 05:59

## 2024-01-07 RX ADMIN — INSULIN GLARGINE 14 UNIT(S): 100 INJECTION, SOLUTION SUBCUTANEOUS at 01:39

## 2024-01-07 RX ADMIN — Medication 6: at 01:42

## 2024-01-07 RX ADMIN — Medication 6: at 13:02

## 2024-01-07 RX ADMIN — Medication 1 GRAM(S): at 05:51

## 2024-01-07 RX ADMIN — Medication 5 MILLIGRAM(S): at 05:59

## 2024-01-07 RX ADMIN — RANOLAZINE 500 MILLIGRAM(S): 500 TABLET, FILM COATED, EXTENDED RELEASE ORAL at 05:59

## 2024-01-07 RX ADMIN — MEMANTINE HYDROCHLORIDE 5 MILLIGRAM(S): 10 TABLET ORAL at 05:54

## 2024-01-07 RX ADMIN — Medication 1 GRAM(S): at 18:49

## 2024-01-07 RX ADMIN — DORNASE ALFA 2.5 MILLIGRAM(S): 1 SOLUTION RESPIRATORY (INHALATION) at 21:35

## 2024-01-07 RX ADMIN — PANTOPRAZOLE SODIUM 40 MILLIGRAM(S): 20 TABLET, DELAYED RELEASE ORAL at 18:47

## 2024-01-07 RX ADMIN — Medication 5 MILLIGRAM(S): at 11:32

## 2024-01-07 RX ADMIN — Medication 3 MILLILITER(S): at 10:45

## 2024-01-07 RX ADMIN — LEVETIRACETAM 250 MILLIGRAM(S): 250 TABLET, FILM COATED ORAL at 18:52

## 2024-01-07 RX ADMIN — INSULIN GLARGINE 14 UNIT(S): 100 INJECTION, SOLUTION SUBCUTANEOUS at 22:50

## 2024-01-07 NOTE — PROGRESS NOTE ADULT - ASSESSMENT
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 CTA demonstrating mesenteric fat stranding associated with ascending/transverse colon. S/p SMA angiography with stent placement with diagnostic laparoscopy 12/24, small bowel and visible colon viable, some inflammation of omentum in RUQ. Course c/b GI bleed, s/p scope on 1/2 with GI with clips placed.     Plan:   - Diet: NPO 2/2 dysphagia, with KO feeding tube supplementation  - consider repeat SLP vs cinesphogram  - Pain control PRN  - protonix bid  - continue keppra  - acute cholecystitis s/p IV abx, tolerating abx.  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 CTA demonstrating mesenteric fat stranding associated with ascending/transverse colon. S/p SMA angiography with stent placement with diagnostic laparoscopy 12/24, small bowel and visible colon viable, some inflammation of omentum in RUQ. Course c/b GI bleed, s/p scope on 1/2 with GI with clips placed.     Plan:   - Diet: NPO 2/2 dysphagia, with KO feeding tube supplementation  - consider repeat SLP vs cinesphogram  - Pain control PRN  - protonix bid  - continue keppra  - acute cholecystitis s/p IV abx, tolerating abx.  - ASA, Brillinta, SQH  - norvasc, metoprolol   - Medicine following  - chest PT  - OOB   - monitor urine output    C Team Surgery   [id number]

## 2024-01-07 NOTE — PROGRESS NOTE ADULT - SUBJECTIVE AND OBJECTIVE BOX
Date of Service  : 01-07-24     INTERVAL HPI/OVERNIGHT EVENTS: I feel okay > grand kids in room   Vital Signs Last 24 Hrs  T(C): 36.9 (07 Jan 2024 11:30), Max: 37 (06 Jan 2024 21:06)  T(F): 98.5 (07 Jan 2024 11:30), Max: 98.6 (06 Jan 2024 21:06)  HR: 81 (07 Jan 2024 11:30) (81 - 90)  BP: 150/72 (07 Jan 2024 11:30) (137/61 - 180/82)  BP(mean): --  RR: 19 (07 Jan 2024 11:30) (19 - 19)  SpO2: 96% (07 Jan 2024 11:30) (95% - 100%)    Parameters below as of 07 Jan 2024 11:30  Patient On (Oxygen Delivery Method): nasal cannula      I&O's Summary    06 Jan 2024 07:01  -  07 Jan 2024 07:00  --------------------------------------------------------  IN: 185 mL / OUT: 0 mL / NET: 185 mL      MEDICATIONS  (STANDING):  albuterol/ipratropium for Nebulization 3 milliLiter(s) Nebulizer every 12 hours  amLODIPine   Tablet 5 milliGRAM(s) Oral daily  aspirin  chewable 81 milliGRAM(s) Oral daily  dornase cierra Solution 2.5 milliGRAM(s) Inhalation every 12 hours  escitalopram 10 milliGRAM(s) Oral daily  gabapentin Solution 100 milliGRAM(s) Oral two times a day  heparin   Injectable 5000 Unit(s) SubCutaneous every 8 hours  insulin glargine Injectable (LANTUS) 14 Unit(s) SubCutaneous at bedtime  insulin lispro (ADMELOG) corrective regimen sliding scale   SubCutaneous every 6 hours  levETIRAcetam   Injectable 250 milliGRAM(s) IV Push every 12 hours  melatonin 3 milliGRAM(s) Oral at bedtime  memantine 5 milliGRAM(s) Oral two times a day  metoprolol tartrate Injectable 5 milliGRAM(s) IV Push every 6 hours  mometasone 110 MICROgram(s) Inhaler 2 Puff(s) Inhalation every 12 hours  pantoprazole  Injectable 40 milliGRAM(s) IV Push every 12 hours  ranolazine 500 milliGRAM(s) Oral two times a day  sodium chloride 0.45% with potassium chloride 20 mEq/L 1000 milliLiter(s) (75 mL/Hr) IV Continuous <Continuous>  sucralfate 1 Gram(s) Oral every 12 hours  ticagrelor 60 milliGRAM(s) Enteral Tube every 12 hours    MEDICATIONS  (PRN):  acetaminophen   IVPB .. 1000 milliGRAM(s) IV Intermittent every 6 hours PRN Mild Pain (1 - 3), Moderate Pain (4 - 6)    LABS:                        9.2    10.00 )-----------( 312      ( 07 Jan 2024 06:00 )             28.6     01-07    137  |  109<H>  |  21  ----------------------------<  301<H>  5.2   |  19<L>  |  1.35<H>    Ca    7.8<L>      07 Jan 2024 06:00  Phos  2.2     01-07  Mg     2.20     01-07    TPro  6.2  /  Alb  2.8<L>  /  TBili  0.6  /  DBili  x   /  AST  33  /  ALT  19  /  AlkPhos  62  01-06      Urinalysis Basic - ( 07 Jan 2024 06:00 )    Color: x / Appearance: x / SG: x / pH: x  Gluc: 301 mg/dL / Ketone: x  / Bili: x / Urobili: x   Blood: x / Protein: x / Nitrite: x   Leuk Esterase: x / RBC: x / WBC x   Sq Epi: x / Non Sq Epi: x / Bacteria: x      CAPILLARY BLOOD GLUCOSE      POCT Blood Glucose.: 258 mg/dL (07 Jan 2024 12:29)  POCT Blood Glucose.: 299 mg/dL (07 Jan 2024 05:59)  POCT Blood Glucose.: 258 mg/dL (07 Jan 2024 01:26)  POCT Blood Glucose.: 219 mg/dL (06 Jan 2024 18:02)        Urinalysis Basic - ( 07 Jan 2024 06:00 )    Color: x / Appearance: x / SG: x / pH: x  Gluc: 301 mg/dL / Ketone: x  / Bili: x / Urobili: x   Blood: x / Protein: x / Nitrite: x   Leuk Esterase: x / RBC: x / WBC x   Sq Epi: x / Non Sq Epi: x / Bacteria: x          Consultant(s) Notes Reviewed:  [x ] YES  [ ] NO    PHYSICAL EXAM:  GENERAL: NAD, ,not in any distress ,NGT +  HEAD:  Atraumatic, Normocephalic  NECK: Supple, No JVD, Normal thyroid  NERVOUS SYSTEM:  Alert & Oriented X3, No focal deficit   CHEST/LUNG: Good air entry bilateral with no  rales, rhonchi, wheezing, or rubs  HEART: Regular rate and rhythm; No murmurs, rubs, or gallops  ABDOMEN: Soft, Nontender, Nondistended; Bowel sounds present  EXTREMITIES:  2+ Peripheral Pulses, No clubbing, cyanosis, or edema    Care Discussed with Consultants/Other Providers [ x] YES  [ ] NO

## 2024-01-07 NOTE — PROGRESS NOTE ADULT - SUBJECTIVE AND OBJECTIVE BOX
SUBJECTIVE: Patient seen and examined. Son is bedside. States that patient is restless and hasn't slept much. Patient remains with cough and congestion. Has not been ambulating. Patient's son requested bladder scan which was 0.    Vital Signs Last 24 Hrs  T(C): 36.6 (07 Jan 2024 05:45), Max: 37 (06 Jan 2024 21:06)  T(F): 97.8 (07 Jan 2024 05:45), Max: 98.6 (06 Jan 2024 21:06)  HR: 86 (07 Jan 2024 05:45) (83 - 90)  BP: 180/82 (07 Jan 2024 05:45) (137/61 - 180/82)  BP(mean): --  RR: 19 (07 Jan 2024 05:45) (18 - 19)  SpO2: 95% (07 Jan 2024 05:45) (95% - 100%)    Parameters below as of 07 Jan 2024 05:45  Patient On (Oxygen Delivery Method): nasal cannula  O2 Flow (L/min): 3      I&O's Detail    06 Jan 2024 07:01  -  07 Jan 2024 07:00  --------------------------------------------------------  IN:    Glucerna 1.5: 185 mL  Total IN: 185 mL    OUT:  Total OUT: 0 mL    Total NET: 185 mL        PHYSICAL EXAM  GEN: No acute distress   CV: RRR, no JVD  LUNGS: NC, lots of secretions with mild wheezing.   ABD: Abdomen soft, NT, ND  EXT: No peripheral edema. Regular range of motion. Palpable DP on b/l LE.    LABS:                        9.0    10.27 )-----------( 301      ( 06 Jan 2024 07:35 )             27.7     01-06    143  |  111<H>  |  20  ----------------------------<  195<H>  5.0   |  20<L>  |  1.49<H>    Ca    8.2<L>      06 Jan 2024 07:35  Phos  2.7     01-06  Mg     1.90     01-06    TPro  6.2  /  Alb  2.8<L>  /  TBili  0.6  /  DBili  x   /  AST  33  /  ALT  19  /  AlkPhos  62  01-06      Urinalysis Basic - ( 06 Jan 2024 07:35 )    Color: x / Appearance: x / SG: x / pH: x  Gluc: 195 mg/dL / Ketone: x  / Bili: x / Urobili: x   Blood: x / Protein: x / Nitrite: x   Leuk Esterase: x / RBC: x / WBC x   Sq Epi: x / Non Sq Epi: x / Bacteria: x        RADIOLOGY & ADDITIONAL STUDIES:

## 2024-01-07 NOTE — PROGRESS NOTE ADULT - ASSESSMENT
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 CTA demonstrating mesenteric fat stranding associated with ascending/transverse colon. S/p SMA angiography with stent placement with diagnostic laparoscopy 12/24, small bowel and visible colon viable, some inflammation of omentum in RUQ. Course c/b GI bleed, s/p scope on 1/2 with GI with clips placed. Patient transferred overnight from SICU to the floor in clinically stable condition.       Problem/Recommendation - 1:  ·  Problem: Type 2 diabetes mellitus with hyperglycemia.   ·  Recommendation: On NGT  and D5 .  Sugars high so  DC D5 IV   Continue Lantus and SSI.     Problem/Recommendation - 2:  ·  Problem: Acute renal failure superimposed on chronic kidney disease.   ·  Recommendation: Creatinine improving      Problem/Recommendation - 3:  ·  Problem: CAD (coronary artery disease).   ·  Recommendation: S/P CABG and Stents. On DAPT and BB.  cardiology help appreciated.     Problem/Recommendation - 4:  ·  Problem: Essential hypertension.   ·  Recommendation: BP meds with hold parameters.     Problem/Recommendation - 5:  ·  Problem: GI bleed.   ·  Recommendation: On PPI.  S/P EGD.   Impression:          - NG tube removed before endoscopy                       - LA Grade B esophagitis.                       - Bleeding Dieulafoy's lesion of the posterior wall of                        the gastric body. This is likely the source of                        clinically significant bleeding. Hemostasis achieved                        using 2 MR conditional hemoclips.                       - Bleeding edematous mucosa and nonbleeding clean based                        gastric ulcers in the posterior wall of the gastric                        body. These are likely due to NG tube irritation.                        Hemostasis achieved using 1 MR conditional hemoclip.                       - Non-bleeding small clean based duodenal ulcer                       - No specimens collected.    < end of copied text >.     Problem/Recommendation - 6:  ·  Problem: Myoclonic jerking.   ·  Recommendation: On keppra.     Problem/Recommendation - 7:  ·  Problem: Dysphagia.   ·  Recommendation: Failed Speech swallow so  awaiting  cineesophagogram   On NGT Feeding.     Problem/Recommendation - 8:  ·  Problem: Abdominal distension.   ·  Recommendation: S/P  SMA angiography with stent placement with diagnostic laparoscopy 12/24,  Vascular following.     Problem/Recommendation - 9:  ·  Problem: Anemia due to acute blood loss.   ·  Recommendation: S/P PRBC.   HH stable now.     Problem/Recommendation - 10:  ·  Problem: Toxic metabolic encephalopathy.   ·  Recommendation: Mental status waxes and wanes .     Problem/Recommendation - 11:  ·  Problem: 2019 novel coronavirus disease (COVID-19).   ·  Recommendation: S/P Renmdisvir.

## 2024-01-07 NOTE — PROGRESS NOTE ADULT - ASSESSMENT
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     EKG SR RBBB (old per family     1) CAD s/p CABG  -p/w chest pain. EKG non ischemic , trop -sera   - echo with normal lv and moderate AS   - c/w metoprolol   - chest pains  Trop mildly elevated and trending down likley sec to kidney disease,    -1/6 c/o of SOB  ?aspiration NGT in place pending CINE. CXR Moderate bilateral pleural effusions consider pulm c/s     2) Covid +  - s/p remdesivir   - c/w supportive management   - t/t per primary team     3) Abd distension   -CTA AP obtained which showed  partially occlusive calcified and non-calcified plaque just distal to take-off of the SMA, with estimated at least 75% luminal narrowing. Limited evaluation of its distal branches. Patient taken emergently to OR on 12/24 s/p diagnostic laparoscopy and SMA stent placement with brachial cutdown  -Surgery/vascular on board , f/u recs  - HIDA scan + for acute asa, medical management as poor surgical candidate cont zosyn  - asa and Brilliant restarted      4) UGIB  -GI on board s/p  EGD 1/2/24 which revealed bleeding vessel (likely Dieulafoy) s/p clipping and NGT trauma s/p clipping, fundus not fully visualized 2/2 clot, minute Tushar III lesion in duodenum.   -on Protonix IV q 12

## 2024-01-07 NOTE — PROGRESS NOTE ADULT - SUBJECTIVE AND OBJECTIVE BOX
Aashish Boyer MD  Interventional Cardiology / Endovascular Specialist  [place] Office : [address] [place] [address] Tel:   [place] Office : [address] [facility] [address]  Tel: [phone]      Subjective/Overnight events: Patient lying in bed, No acute distress  	  MEDICATIONS:  amLODIPine   Tablet 5 milliGRAM(s) Oral daily  aspirin  chewable 81 milliGRAM(s) Oral daily  heparin   Injectable 5000 Unit(s) SubCutaneous every 8 hours  metoprolol tartrate Injectable 5 milliGRAM(s) IV Push every 6 hours  ranolazine 500 milliGRAM(s) Oral two times a day  ticagrelor 60 milliGRAM(s) Enteral Tube every 12 hours      albuterol/ipratropium for Nebulization 3 milliLiter(s) Nebulizer every 12 hours  dornase cierra Solution 2.5 milliGRAM(s) Inhalation every 12 hours  mometasone 110 MICROgram(s) Inhaler 2 Puff(s) Inhalation every 12 hours    acetaminophen   IVPB .. 1000 milliGRAM(s) IV Intermittent every 6 hours PRN  escitalopram 10 milliGRAM(s) Oral daily  gabapentin Solution 100 milliGRAM(s) Oral two times a day  levETIRAcetam   Injectable 250 milliGRAM(s) IV Push every 12 hours  melatonin 3 milliGRAM(s) Oral at bedtime  memantine 5 milliGRAM(s) Oral two times a day    pantoprazole  Injectable 40 milliGRAM(s) IV Push every 12 hours  sucralfate 1 Gram(s) Oral every 12 hours    insulin glargine Injectable (LANTUS) 14 Unit(s) SubCutaneous at bedtime  insulin lispro (ADMELOG) corrective regimen sliding scale   SubCutaneous every 6 hours    sodium chloride 0.45% with potassium chloride 20 mEq/L 1000 milliLiter(s) IV Continuous <Continuous>      PAST MEDICAL/SURGICAL HISTORY  PAST MEDICAL & SURGICAL HISTORY:  Hyperlipemia      Hypertension      Coronary Artery Disease      Diabetes Mellitus Type II      Stented Coronary Artery  total 5 stents, last stent 5/2019      Neuropathy      Myocardial infarction      Stroke  mild left facial numbness   no other residuals verbalized      Myoclonic jerking      Stage 3 chronic kidney disease      History of Cataract Extraction      Hx of CABG      H/O coronary angiogram      S/P coronary artery stent placement  1/6/09      S/P placement of cardiac pacemaker          SOCIAL HISTORY: Substance Use (street drugs): ( x ) never used  (  ) other:    FAMILY HISTORY:  No pertinent family history in first degree relatives          PHYSICAL EXAM:  T(C): 36.9 (01-07-24 @ 11:30), Max: 37 (01-06-24 @ 21:06)  HR: 81 (01-07-24 @ 11:30) (81 - 90)  BP: 150/72 (01-07-24 @ 11:30) (137/61 - 180/82)  RR: 19 (01-07-24 @ 11:30) (19 - 19)  SpO2: 96% (01-07-24 @ 11:30) (95% - 100%)  Wt(kg): --  I&O's Summary    06 Jan 2024 07:01  -  07 Jan 2024 07:00  --------------------------------------------------------  IN: 185 mL / OUT: 0 mL / NET: 185 mL          GENERAL: NAD  EYES:  conjunctiva and sclera clear  ENMT: No tonsillar erythema, exudates, or enlargement  Cardiovascular: Normal S1 S2, No JVD, No murmurs, No edema  Respiratory: Lungs clear to auscultation	  Gastrointestinal:  Soft,   Extremities: No edema                                     9.2    10.00 )-----------( 312      ( 07 Jan 2024 06:00 )             28.6     01-07    137  |  109<H>  |  21  ----------------------------<  301<H>  5.2   |  19<L>  |  1.35<H>    Ca    7.8<L>      07 Jan 2024 06:00  Phos  2.2     01-07  Mg     2.20     01-07    TPro  6.2  /  Alb  2.8<L>  /  TBili  0.6  /  DBili  x   /  AST  33  /  ALT  19  /  AlkPhos  62  01-06    proBNP:   Lipid Profile:   HgA1c:   TSH:     Consultant(s) Notes Reviewed:  [x ] YES  [ ] NO    Care Discussed with Consultants/Other Providers [ x] YES  [ ] NO    Imaging Personally Reviewed independently:  [x] YES  [ ] NO    All labs, radiologic studies, vitals, orders and medications list reviewed. Patient is seen and examined at bedside. Case discussed with medical team.                 Aashish Boyer MD  Interventional Cardiology / Endovascular Specialist  [place] Office : [address] [place] [address] Tel:   [place] Office : [address] [facility] [address]  Tel: [phone]      Subjective/Overnight events: Patient lying in bed, No acute distress  	  MEDICATIONS:  amLODIPine   Tablet 5 milliGRAM(s) Oral daily  aspirin  chewable 81 milliGRAM(s) Oral daily  heparin   Injectable 5000 Unit(s) SubCutaneous every 8 hours  metoprolol tartrate Injectable 5 milliGRAM(s) IV Push every 6 hours  ranolazine 500 milliGRAM(s) Oral two times a day  ticagrelor 60 milliGRAM(s) Enteral Tube every 12 hours      albuterol/ipratropium for Nebulization 3 milliLiter(s) Nebulizer every 12 hours  dornase cierra Solution 2.5 milliGRAM(s) Inhalation every 12 hours  mometasone 110 MICROgram(s) Inhaler 2 Puff(s) Inhalation every 12 hours    acetaminophen   IVPB .. 1000 milliGRAM(s) IV Intermittent every 6 hours PRN  escitalopram 10 milliGRAM(s) Oral daily  gabapentin Solution 100 milliGRAM(s) Oral two times a day  levETIRAcetam   Injectable 250 milliGRAM(s) IV Push every 12 hours  melatonin 3 milliGRAM(s) Oral at bedtime  memantine 5 milliGRAM(s) Oral two times a day    pantoprazole  Injectable 40 milliGRAM(s) IV Push every 12 hours  sucralfate 1 Gram(s) Oral every 12 hours    insulin glargine Injectable (LANTUS) 14 Unit(s) SubCutaneous at bedtime  insulin lispro (ADMELOG) corrective regimen sliding scale   SubCutaneous every 6 hours    sodium chloride 0.45% with potassium chloride 20 mEq/L 1000 milliLiter(s) IV Continuous <Continuous>      PAST MEDICAL/SURGICAL HISTORY  PAST MEDICAL & SURGICAL HISTORY:  Hyperlipemia      Hypertension      Coronary Artery Disease      Diabetes Mellitus Type II      Stented Coronary Artery  total 5 stents, last stent 5/2019      Neuropathy      Myocardial infarction      Stroke  mild left facial numbness   no other residuals verbalized      Myoclonic jerking      Stage 3 chronic kidney disease      History of Cataract Extraction      Hx of CABG      H/O coronary angiogram      S/P coronary artery stent placement  1/6/09      S/P placement of cardiac pacemaker          SOCIAL HISTORY: Substance Use (street drugs): ( x ) never used  (  ) other:    FAMILY HISTORY:  No pertinent family history in first degree relatives          PHYSICAL EXAM:  T(C): 36.9 (01-07-24 @ 11:30), Max: 37 (01-06-24 @ 21:06)  HR: 81 (01-07-24 @ 11:30) (81 - 90)  BP: 150/72 (01-07-24 @ 11:30) (137/61 - 180/82)  RR: 19 (01-07-24 @ 11:30) (19 - 19)  SpO2: 96% (01-07-24 @ 11:30) (95% - 100%)  Wt(kg): --  I&O's Summary    06 Jan 2024 07:01  -  07 Jan 2024 07:00  --------------------------------------------------------  IN: 185 mL / OUT: 0 mL / NET: 185 mL          GENERAL: NAD  EYES:  conjunctiva and sclera clear  ENMT: No tonsillar erythema, exudates, or enlargement  Cardiovascular: Normal S1 S2, No JVD, No murmurs, No edema  Respiratory: Lungs clear to auscultation	  Gastrointestinal:  Soft,   Extremities: No edema                                     9.2    10.00 )-----------( 312      ( 07 Jan 2024 06:00 )             28.6     01-07    137  |  109<H>  |  21  ----------------------------<  301<H>  5.2   |  19<L>  |  1.35<H>    Ca    7.8<L>      07 Jan 2024 06:00  Phos  2.2     01-07  Mg     2.20     01-07    TPro  6.2  /  Alb  2.8<L>  /  TBili  0.6  /  DBili  x   /  AST  33  /  ALT  19  /  AlkPhos  62  01-06    proBNP:   Lipid Profile:   HgA1c:   TSH:     Consultant(s) Notes Reviewed:  [x ] YES  [ ] NO    Care Discussed with Consultants/Other Providers [ x] YES  [ ] NO    Imaging Personally Reviewed independently:  [x] YES  [ ] NO    All labs, radiologic studies, vitals, orders and medications list reviewed. Patient is seen and examined at bedside. Case discussed with medical team.

## 2024-01-07 NOTE — SWALLOW BEDSIDE ASSESSMENT ADULT - COMMENTS
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 CTA demonstrating mesenteric fat stranding associated with ascending/transverse colon. S/p SMA angiography with stent placement with diagnostic laparoscopy 12/24, small bowel and visible colon viable, some inflammation of omentum in RUQ. Course c/b GI bleed, s/p scope on 1/2 with GI with clips placed."     CXR 1/6: "IMPRESSION: Moderate bilateral pleural effusions. Status post NG tube placement."    Of note, patient seen for initial swallow assessment on 1/3 with recommendations to continue Clear Liquids. (see report for details)     Patient seen awake/alert during clinical swallow evaluation this AM with patient's son present at bedside. Patient's son reports observed difficulty swallowing since admission and states the patient eats "soft" solids at home such as "cut up meat" and bread softened with soup. Patient is noted with congested/wet quality breathing sounds. Patient given cue to cough prior to administration of PO trials however unable to clear phlegm. Patient with nasal cannula. Of note, patient declined/refused additional PO trials and reports "stuck" sensation post oral intake of trials.

## 2024-01-07 NOTE — PROGRESS NOTE ADULT - ASSESSMENT
still with cough  bladder scan was zero  NAD.     acetaminophen   IVPB .. 1000 milliGRAM(s) IV Intermittent every 6 hours PRN  albuterol/ipratropium for Nebulization 3 milliLiter(s) Nebulizer every 12 hours  amLODIPine   Tablet 5 milliGRAM(s) Oral daily  aspirin  chewable 81 milliGRAM(s) Oral daily  dornase cierra Solution 2.5 milliGRAM(s) Inhalation every 12 hours  escitalopram 10 milliGRAM(s) Oral daily  gabapentin Solution 100 milliGRAM(s) Oral two times a day  heparin   Injectable 5000 Unit(s) SubCutaneous every 8 hours  insulin glargine Injectable (LANTUS) 14 Unit(s) SubCutaneous at bedtime  insulin lispro (ADMELOG) corrective regimen sliding scale   SubCutaneous every 6 hours  levETIRAcetam   Injectable 250 milliGRAM(s) IV Push every 12 hours  melatonin 3 milliGRAM(s) Oral at bedtime  memantine 5 milliGRAM(s) Oral two times a day  metoprolol tartrate Injectable 5 milliGRAM(s) IV Push every 6 hours  mometasone 110 MICROgram(s) Inhaler 2 Puff(s) Inhalation every 12 hours  pantoprazole  Injectable 40 milliGRAM(s) IV Push every 12 hours  ranolazine 500 milliGRAM(s) Oral two times a day  sodium chloride 0.45% with potassium chloride 20 mEq/L 1000 milliLiter(s) IV Continuous <Continuous>  sodium phosphate 30 milliMole(s)/500 mL IVPB 30 milliMole(s) IV Intermittent once  sucralfate 1 Gram(s) Oral every 12 hours  ticagrelor 60 milliGRAM(s) Enteral Tube every 12 hours      VITAL:  T(C): , Max: 37 (01-06-24 @ 21:06)  T(F): , Max: 98.6 (01-06-24 @ 21:06)  HR: 86 (01-07-24 @ 05:45)  BP: 180/82 (01-07-24 @ 05:45)  BP(mean): --  RR: 19 (01-07-24 @ 05:45)  SpO2: 95% (01-07-24 @ 05:45)  Wt(kg): --    01-06-24 @ 07:01  -  01-07-24 @ 07:00  --------------------------------------------------------  IN: 185 mL / OUT: 0 mL / NET: 185 mL        PHYSICAL EXAM:  General: NAD; Alert  HEENT:  NCAT; PERRLA, +NGT  Neck: No JVD; supple  Respiratory:   Cardiac: RRR s1s2  Gastrointestinal: BS+, soft, NT/ND  Urologic: No delong  Extremities: No peripheral edema  Access:     LABS:                          9.2    10.00 )-----------( 312      ( 07 Jan 2024 06:00 )             28.6     Na(137)/K(5.2)/Cl(109)/HCO3(19)/BUN(21)/Cr(1.35)Glu(301)/Ca(7.8)/Mg(2.20)/PO4(2.2)    01-07 @ 06:00  Na(143)/K(5.0)/Cl(111)/HCO3(20)/BUN(20)/Cr(1.49)Glu(195)/Ca(8.2)/Mg(1.90)/PO4(2.7)    01-06 @ 07:35  Na(142)/K(5.1)/Cl(111)/HCO3(19)/BUN(22)/Cr(1.45)Glu(219)/Ca(8.0)/Mg(1.90)/PO4(3.2)    01-05 @ 22:19  Na(140)/K(5.0)/Cl(111)/HCO3(16)/BUN(24)/Cr(1.41)Glu(199)/Ca(8.1)/Mg(1.90)/PO4(3.3)    01-05 @ 01:00    Urinalysis Basic - ( 07 Jan 2024 06:00 )    Color: x / Appearance: x / SG: x / pH: x  Gluc: 301 mg/dL / Ketone: x  / Bili: x / Urobili: x   Blood: x / Protein: x / Nitrite: x   Leuk Esterase: x / RBC: x / WBC x   Sq Epi: x / Non Sq Epi: x / Bacteria: x            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) who presents to the hospital for COVID19 infection and chest pain  MABLE   Hypophosphatemia - supplemented with Kphos 30milimoles IV x 1 dose  Hypertensive urgency - BP meds as oredered     1 Renal - renal fxn improving  S/p CT with contrast   2 Lytes- grossly controlled  3 Hypocalcemia S/p calcium gluconate 1g iv x 1 (1/5/24) corrected ~8.8mg/dl  4 CV - BP elevated, on Lopressor 5 mg IV q6h, pending repeat BP; consider clonidine patch 0.1   5 Vasc - s/p SMA stent placement;   6 Sx - s/p HIDA + for acute asa, medical management, remains NPO   7 GI - s/p EGD clipping of bleeding duodenal ulcers - on protonix gtt  8 Anemia- hgb improving         Larry Cohen, NP-BC  [employer]  [phone]   still with cough  bladder scan was zero  NAD.     acetaminophen   IVPB .. 1000 milliGRAM(s) IV Intermittent every 6 hours PRN  albuterol/ipratropium for Nebulization 3 milliLiter(s) Nebulizer every 12 hours  amLODIPine   Tablet 5 milliGRAM(s) Oral daily  aspirin  chewable 81 milliGRAM(s) Oral daily  dornase cierra Solution 2.5 milliGRAM(s) Inhalation every 12 hours  escitalopram 10 milliGRAM(s) Oral daily  gabapentin Solution 100 milliGRAM(s) Oral two times a day  heparin   Injectable 5000 Unit(s) SubCutaneous every 8 hours  insulin glargine Injectable (LANTUS) 14 Unit(s) SubCutaneous at bedtime  insulin lispro (ADMELOG) corrective regimen sliding scale   SubCutaneous every 6 hours  levETIRAcetam   Injectable 250 milliGRAM(s) IV Push every 12 hours  melatonin 3 milliGRAM(s) Oral at bedtime  memantine 5 milliGRAM(s) Oral two times a day  metoprolol tartrate Injectable 5 milliGRAM(s) IV Push every 6 hours  mometasone 110 MICROgram(s) Inhaler 2 Puff(s) Inhalation every 12 hours  pantoprazole  Injectable 40 milliGRAM(s) IV Push every 12 hours  ranolazine 500 milliGRAM(s) Oral two times a day  sodium chloride 0.45% with potassium chloride 20 mEq/L 1000 milliLiter(s) IV Continuous <Continuous>  sodium phosphate 30 milliMole(s)/500 mL IVPB 30 milliMole(s) IV Intermittent once  sucralfate 1 Gram(s) Oral every 12 hours  ticagrelor 60 milliGRAM(s) Enteral Tube every 12 hours      VITAL:  T(C): , Max: 37 (01-06-24 @ 21:06)  T(F): , Max: 98.6 (01-06-24 @ 21:06)  HR: 86 (01-07-24 @ 05:45)  BP: 180/82 (01-07-24 @ 05:45)  BP(mean): --  RR: 19 (01-07-24 @ 05:45)  SpO2: 95% (01-07-24 @ 05:45)  Wt(kg): --    01-06-24 @ 07:01  -  01-07-24 @ 07:00  --------------------------------------------------------  IN: 185 mL / OUT: 0 mL / NET: 185 mL        PHYSICAL EXAM:  General: NAD; Alert  HEENT:  NCAT; PERRLA, +NGT  Neck: No JVD; supple  Respiratory:   Cardiac: RRR s1s2  Gastrointestinal: BS+, soft, NT/ND  Urologic: No delong  Extremities: No peripheral edema  Access:     LABS:                          9.2    10.00 )-----------( 312      ( 07 Jan 2024 06:00 )             28.6     Na(137)/K(5.2)/Cl(109)/HCO3(19)/BUN(21)/Cr(1.35)Glu(301)/Ca(7.8)/Mg(2.20)/PO4(2.2)    01-07 @ 06:00  Na(143)/K(5.0)/Cl(111)/HCO3(20)/BUN(20)/Cr(1.49)Glu(195)/Ca(8.2)/Mg(1.90)/PO4(2.7)    01-06 @ 07:35  Na(142)/K(5.1)/Cl(111)/HCO3(19)/BUN(22)/Cr(1.45)Glu(219)/Ca(8.0)/Mg(1.90)/PO4(3.2)    01-05 @ 22:19  Na(140)/K(5.0)/Cl(111)/HCO3(16)/BUN(24)/Cr(1.41)Glu(199)/Ca(8.1)/Mg(1.90)/PO4(3.3)    01-05 @ 01:00    Urinalysis Basic - ( 07 Jan 2024 06:00 )    Color: x / Appearance: x / SG: x / pH: x  Gluc: 301 mg/dL / Ketone: x  / Bili: x / Urobili: x   Blood: x / Protein: x / Nitrite: x   Leuk Esterase: x / RBC: x / WBC x   Sq Epi: x / Non Sq Epi: x / Bacteria: x            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) who presents to the hospital for COVID19 infection and chest pain  MABLE   Hypophosphatemia - supplemented with Kphos 30milimoles IV x 1 dose  Hypertensive urgency - BP meds as oredered     1 Renal - renal fxn improving  S/p CT with contrast   2 Lytes- grossly controlled  3 Hypocalcemia S/p calcium gluconate 1g iv x 1 (1/5/24) corrected ~8.8mg/dl  4 CV - BP elevated, on Lopressor 5 mg IV q6h, pending repeat BP; consider clonidine patch 0.1   5 Vasc - s/p SMA stent placement;   6 Sx - s/p HIDA + for acute asa, medical management, remains NPO   7 GI - s/p EGD clipping of bleeding duodenal ulcers - on protonix gtt  8 Anemia- hgb improving         Larry Cohen, NP-BC  [employer] [facility]  [phone]

## 2024-01-08 ENCOUNTER — TRANSCRIPTION ENCOUNTER (OUTPATIENT)
Age: 89
End: 2024-01-08

## 2024-01-08 LAB
GLUCOSE BLDC GLUCOMTR-MCNC: 188 MG/DL — HIGH (ref 70–99)
GLUCOSE BLDC GLUCOMTR-MCNC: 188 MG/DL — HIGH (ref 70–99)
GLUCOSE BLDC GLUCOMTR-MCNC: 199 MG/DL — HIGH (ref 70–99)
GLUCOSE BLDC GLUCOMTR-MCNC: 199 MG/DL — HIGH (ref 70–99)
GLUCOSE BLDC GLUCOMTR-MCNC: 273 MG/DL — HIGH (ref 70–99)
GLUCOSE BLDC GLUCOMTR-MCNC: 273 MG/DL — HIGH (ref 70–99)

## 2024-01-08 PROCEDURE — 99232 SBSQ HOSP IP/OBS MODERATE 35: CPT

## 2024-01-08 PROCEDURE — 74230 X-RAY XM SWLNG FUNCJ C+: CPT | Mod: 26

## 2024-01-08 PROCEDURE — 93970 EXTREMITY STUDY: CPT | Mod: 26

## 2024-01-08 RX ORDER — METOPROLOL TARTRATE 50 MG
2.5 TABLET ORAL ONCE
Refills: 0 | Status: COMPLETED | OUTPATIENT
Start: 2024-01-08 | End: 2024-01-08

## 2024-01-08 RX ADMIN — TICAGRELOR 60 MILLIGRAM(S): 90 TABLET ORAL at 07:44

## 2024-01-08 RX ADMIN — AMLODIPINE BESYLATE 5 MILLIGRAM(S): 2.5 TABLET ORAL at 07:44

## 2024-01-08 RX ADMIN — MEMANTINE HYDROCHLORIDE 5 MILLIGRAM(S): 10 TABLET ORAL at 18:26

## 2024-01-08 RX ADMIN — Medication 6: at 12:48

## 2024-01-08 RX ADMIN — Medication 5 MILLIGRAM(S): at 08:01

## 2024-01-08 RX ADMIN — PANTOPRAZOLE SODIUM 40 MILLIGRAM(S): 20 TABLET, DELAYED RELEASE ORAL at 11:59

## 2024-01-08 RX ADMIN — GABAPENTIN 100 MILLIGRAM(S): 400 CAPSULE ORAL at 07:45

## 2024-01-08 RX ADMIN — HEPARIN SODIUM 5000 UNIT(S): 5000 INJECTION INTRAVENOUS; SUBCUTANEOUS at 22:43

## 2024-01-08 RX ADMIN — Medication 5 MILLIGRAM(S): at 01:28

## 2024-01-08 RX ADMIN — ESCITALOPRAM OXALATE 10 MILLIGRAM(S): 10 TABLET, FILM COATED ORAL at 14:45

## 2024-01-08 RX ADMIN — Medication 5 MILLIGRAM(S): at 14:43

## 2024-01-08 RX ADMIN — GABAPENTIN 100 MILLIGRAM(S): 400 CAPSULE ORAL at 18:30

## 2024-01-08 RX ADMIN — Medication 1 GRAM(S): at 07:45

## 2024-01-08 RX ADMIN — RANOLAZINE 500 MILLIGRAM(S): 500 TABLET, FILM COATED, EXTENDED RELEASE ORAL at 18:29

## 2024-01-08 RX ADMIN — DEXTROSE MONOHYDRATE, SODIUM CHLORIDE, AND POTASSIUM CHLORIDE 75 MILLILITER(S): 50; .745; 4.5 INJECTION, SOLUTION INTRAVENOUS at 07:43

## 2024-01-08 RX ADMIN — MEMANTINE HYDROCHLORIDE 5 MILLIGRAM(S): 10 TABLET ORAL at 07:44

## 2024-01-08 RX ADMIN — Medication 2: at 18:15

## 2024-01-08 RX ADMIN — HEPARIN SODIUM 5000 UNIT(S): 5000 INJECTION INTRAVENOUS; SUBCUTANEOUS at 07:55

## 2024-01-08 RX ADMIN — Medication 81 MILLIGRAM(S): at 14:45

## 2024-01-08 RX ADMIN — MOMETASONE FUROATE 2 PUFF(S): 220 INHALANT RESPIRATORY (INHALATION) at 14:50

## 2024-01-08 RX ADMIN — LEVETIRACETAM 250 MILLIGRAM(S): 250 TABLET, FILM COATED ORAL at 07:56

## 2024-01-08 RX ADMIN — Medication 2.5 MILLIGRAM(S): at 17:32

## 2024-01-08 RX ADMIN — LEVETIRACETAM 250 MILLIGRAM(S): 250 TABLET, FILM COATED ORAL at 18:31

## 2024-01-08 RX ADMIN — HEPARIN SODIUM 5000 UNIT(S): 5000 INJECTION INTRAVENOUS; SUBCUTANEOUS at 17:34

## 2024-01-08 RX ADMIN — Medication 4: at 01:26

## 2024-01-08 NOTE — PROGRESS NOTE ADULT - ASSESSMENT
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     EKG SR RBBB (old per family     1) CAD s/p CABG  -p/w chest pain. EKG non ischemic , trop -sera   - echo with normal lv and moderate AS   - c/w metoprolol   - chest pains  Trop mildly elevated and trending down likley sec to kidney disease,    -1/6 c/o of SOB  ?aspiration NGT in place. CXR Moderate bilateral pleural effusions consider pulm c/s also with anasarca consider IV lasix 40mg daily     2) Covid +  - s/p remdesivir   - c/w supportive management   - t/t per primary team     3) Abd distension   -CTA AP obtained which showed  partially occlusive calcified and non-calcified plaque just distal to take-off of the SMA, with estimated at least 75% luminal narrowing. Limited evaluation of its distal branches. Patient taken emergently to OR on 12/24 s/p diagnostic laparoscopy and SMA stent placement with brachial cutdown  -Surgery/vascular on board , f/u recs  - HIDA scan + for acute asa, medical management as poor surgical candidate cont zosyn  - asa and Brilliant restarted      4) UGIB  -GI on board s/p  EGD 1/2/24 which revealed bleeding vessel (likely Dieulafoy) s/p clipping and NGT trauma s/p clipping, fundus not fully visualized 2/2 clot, minute Tushar III lesion in duodenum.   -on Protonix IV q 12

## 2024-01-08 NOTE — DISCHARGE NOTE PROVIDER - PROVIDER TOKENS
PROVIDER:[TOKEN:[[id number]],FOLLOWUP:[2 weeks]] PROVIDER:[TOKEN:[[id number]],FOLLOWUP:[2 weeks]] PROVIDER:[TOKEN:[[id number]],FOLLOWUP:[2 weeks]],PROVIDER:[TOKEN:[[id number]],FOLLOWUP:[1 week]],PROVIDER:[TOKEN:[[id number]]] PROVIDER:[TOKEN:[[id number]],FOLLOWUP:[2 weeks]],PROVIDER:[TOKEN:[[id number]],FOLLOWUP:[1 week]],PROVIDER:[TOKEN:[[id number]]] PROVIDER:[TOKEN:[[id number]],FOLLOWUP:[2 weeks]],PROVIDER:[TOKEN:[[id number]],FOLLOWUP:[1 week]],PROVIDER:[TOKEN:[[id number]]],FREE:[LAST:[[facility] Inteventional Radiology at [facility]],PHONE:[phone]],FAX:[(   )    -],ADDRESS:[[address] [place] [address]  [address] Interventional Radiology [address]]] PROVIDER:[TOKEN:[[id number]],FOLLOWUP:[2 weeks]],PROVIDER:[TOKEN:[[id number]],FOLLOWUP:[1 week]],PROVIDER:[TOKEN:[[id number]]],FREE:[LAST:[[facility] Inteventional Radiology at [facility]],PHONE:[phone]],FAX:[(   )    -],ADDRESS:[[address] [place] [address]  [address] Interventional Radiology [address]]] PROVIDER:[TOKEN:[[id number]],FOLLOWUP:[2 weeks]],PROVIDER:[TOKEN:[[id number]],FOLLOWUP:[1 week]],PROVIDER:[TOKEN:[[id number]]],FREE:[LAST:[[facility] Inteventional Radiology at [facility]],PHONE:[phone]],FAX:[(   )    -],ADDRESS:[[address] [place] [address]  [address] Interventional Radiology [address]]],PROVIDER:[TOKEN:[[id number]],FOLLOWUP:[1 month]] PROVIDER:[TOKEN:[[id number]],FOLLOWUP:[2 weeks]],PROVIDER:[TOKEN:[[id number]],FOLLOWUP:[1 week]],PROVIDER:[TOKEN:[[id number]]],FREE:[LAST:[[facility] Inteventional Radiology at [facility]],PHONE:[phone]],FAX:[(   )    -],ADDRESS:[[address] [place] [address]  [address]nd [address] Interventional Radiology [address]]],PROVIDER:[TOKEN:[[id number]],FOLLOWUP:[1 month]]

## 2024-01-08 NOTE — PROGRESS NOTE ADULT - SUBJECTIVE AND OBJECTIVE BOX
Date of Service  : 01-08-24     INTERVAL HPI/OVERNIGHT EVENTS: Going for cineesophagogram . C/O Pain legs    Vital Signs Last 24 Hrs  T(C): 36.8 (08 Jan 2024 07:40), Max: 36.9 (07 Jan 2024 11:30)  T(F): 98.3 (08 Jan 2024 07:40), Max: 98.5 (07 Jan 2024 11:30)  HR: 87 (08 Jan 2024 07:40) (79 - 87)  BP: 145/60 (08 Jan 2024 07:40) (137/63 - 150/72)  BP(mean): --  RR: 18 (08 Jan 2024 07:40) (18 - 19)  SpO2: 97% (08 Jan 2024 07:40) (96% - 100%)    Parameters below as of 08 Jan 2024 07:40  Patient On (Oxygen Delivery Method): nasal cannula  O2 Flow (L/min): 4    I&O's Summary    MEDICATIONS  (STANDING):  albuterol/ipratropium for Nebulization 3 milliLiter(s) Nebulizer every 12 hours  amLODIPine   Tablet 5 milliGRAM(s) Oral daily  aspirin  chewable 81 milliGRAM(s) Oral daily  dornase cierra Solution 2.5 milliGRAM(s) Inhalation every 12 hours  escitalopram 10 milliGRAM(s) Oral daily  gabapentin Solution 100 milliGRAM(s) Oral two times a day  heparin   Injectable 5000 Unit(s) SubCutaneous every 8 hours  insulin glargine Injectable (LANTUS) 14 Unit(s) SubCutaneous at bedtime  insulin lispro (ADMELOG) corrective regimen sliding scale   SubCutaneous every 6 hours  levETIRAcetam   Injectable 250 milliGRAM(s) IV Push every 12 hours  melatonin 3 milliGRAM(s) Oral at bedtime  memantine 5 milliGRAM(s) Oral two times a day  metoprolol tartrate Injectable 5 milliGRAM(s) IV Push every 6 hours  mometasone 110 MICROgram(s) Inhaler 2 Puff(s) Inhalation every 12 hours  pantoprazole  Injectable 40 milliGRAM(s) IV Push every 12 hours  ranolazine 500 milliGRAM(s) Oral two times a day  sodium chloride 0.45% with potassium chloride 20 mEq/L 1000 milliLiter(s) (75 mL/Hr) IV Continuous <Continuous>  sucralfate 1 Gram(s) Oral every 12 hours  ticagrelor 60 milliGRAM(s) Enteral Tube every 12 hours    MEDICATIONS  (PRN):  acetaminophen   IVPB .. 1000 milliGRAM(s) IV Intermittent every 6 hours PRN Mild Pain (1 - 3), Moderate Pain (4 - 6)    LABS:                        9.2    10.00 )-----------( 312      ( 07 Jan 2024 06:00 )             28.6     01-07    137  |  109<H>  |  21  ----------------------------<  301<H>  5.2   |  19<L>  |  1.35<H>    Ca    7.8<L>      07 Jan 2024 06:00  Phos  2.2     01-07  Mg     2.20     01-07        Urinalysis Basic - ( 07 Jan 2024 06:00 )    Color: x / Appearance: x / SG: x / pH: x  Gluc: 301 mg/dL / Ketone: x  / Bili: x / Urobili: x   Blood: x / Protein: x / Nitrite: x   Leuk Esterase: x / RBC: x / WBC x   Sq Epi: x / Non Sq Epi: x / Bacteria: x      CAPILLARY BLOOD GLUCOSE      POCT Blood Glucose.: 212 mg/dL (08 Jan 2024 01:01)  POCT Blood Glucose.: 249 mg/dL (07 Jan 2024 22:48)  POCT Blood Glucose.: 258 mg/dL (07 Jan 2024 18:17)  POCT Blood Glucose.: 258 mg/dL (07 Jan 2024 12:29)        Urinalysis Basic - ( 07 Jan 2024 06:00 )    Color: x / Appearance: x / SG: x / pH: x  Gluc: 301 mg/dL / Ketone: x  / Bili: x / Urobili: x   Blood: x / Protein: x / Nitrite: x   Leuk Esterase: x / RBC: x / WBC x   Sq Epi: x / Non Sq Epi: x / Bacteria: x      REVIEW OF SYSTEMS:  CONSTITUTIONAL: No fever, weight loss, or fatigue  EYES: No eye pain, visual disturbances, or discharge  ENMT:  No difficulty hearing, tinnitus, vertigo; No sinus or throat pain  NECK: No pain or stiffness  RESPIRATORY: No cough, wheezing, chills or hemoptysis; No shortness of breath  CARDIOVASCULAR: No chest pain, palpitations, dizziness,   GASTROINTESTINAL: No abdominal or epigastric pain. No nausea, vomiting, or hematemesis; No diarrhea or constipation. No melena or hematochezia.  GENITOURINARY: No dysuria, frequency, hematuria, or incontinence  NEUROLOGICAL: No headaches, memory loss, loss of strength, numbness, or tremors  SKIN: No itching, burning, rashes, or lesions   ENDOCRINE: No heat or cold intolerance; No hair loss      Consultant(s) Notes Reviewed:  [x ] YES  [ ] NO    PHYSICAL EXAM:  GENERAL: Not in any distress , NGT +  HEAD:  Atraumatic, Normocephalic  NECK: Supple, No JVD, Normal thyroid  NERVOUS SYSTEM:  Alert & Oriented X2 to 3, No focal deficit   CHEST/LUNG: Good air entry bilateral with no  rales, rhonchi, wheezing, or rubs  HEART: Regular rate and rhythm; No murmurs, rubs, or gallops  ABDOMEN: Soft, Nontender, Nondistended; Bowel sounds present  EXTREMITIES:  2+  edema    Care Discussed with Consultants/Other Providers [ x] YES  [ ] NO

## 2024-01-08 NOTE — SWALLOW VFSS/MBS ASSESSMENT ADULT - PHARYNGEAL PHASE COMMENTS
adequate initiation of the pharyngeal swallow, adequate laryngeal elevation, reduced tongue base retraction, reduced pharyngeal constriction delayed initiation of the pharyngeal swallow, reduced laryngeal elevation, reduced tongue base retraction, reduced pharyngeal constriction adequate initiation of the pharyngeal swallow, adequate laryngeal elevation, reduced tongue base retraction and reduced pharyngeal constriction

## 2024-01-08 NOTE — DISCHARGE NOTE PROVIDER - NSDCCPCAREPLAN_GEN_ALL_CORE_FT
PRINCIPAL DISCHARGE DIAGNOSIS  Diagnosis: Abdominal pain  Assessment and Plan of Treatment: S/p SMA angiography with stent placement with diagnostic laparoscopy 12/24      SECONDARY DISCHARGE DIAGNOSES  Diagnosis: COVID-19  Assessment and Plan of Treatment: s/p remdesivir.    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     PRINCIPAL DISCHARGE DIAGNOSIS  Diagnosis: Acute hypoxic respiratory failure  Assessment and Plan of Treatment: You were seen in the hospital with COVID complicated by sepsis second to acute asa and developed worsening respiratory failure second to pulmonary edema requiring intubation. You now have a tracheostomy and are breathing via a ventilator. Your completed antibiotics and COVID regimen. Please continue on respiratory care regimen as per facility protocol and follow up as outpatient with your lung doctor and PCP.      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 A CTA was performed showing mesenteric fat stranding associated with ascending/transverse colon. You had Diagnostic laparoscopy with small bowel and visible colon viable, some inflammation of omentum in RUQ. You then had SMA Angiogram and stent placed. Continue on ASA and Brilinta. Follow up as outpatient with PCP and Vascular surgery.    Diagnosis: GI bleed  Assessment and Plan of Treatment: You had an EGD with esophagitis and bleeding due to Dieulafoy's lesion s/p clips. Continue on PPI and carafate. Follow up outpatient with Gastroenterology.    Diagnosis: Vascular dementia  Assessment and Plan of Treatment: You were started on Valproic acid syrup for agitation/ delirium likely secondary to underlying vascular dementia. Please continue medication as instructed. You will need to have your VPA level monitored as outpatient and adjusted appropiately for low albumin by your PCP/ Neurologist. You will need your platelets, LFTs, albumin, ammonia, and VPA level monitored at rehab and adjusted accordingly.    Diagnosis: Aortic stenosis  Assessment and Plan of Treatment: Continue medication regimen to achieve strict blood pressure control and follow up with your PCP and heart doctor.    Diagnosis: Encounter for PEG (percutaneous endoscopic gastrostomy)  Assessment and Plan of Treatment: You now have a PEG aka feeding tube for medications and nutrition. Please continue to use for food and medications. Follow up as outpatient with Gastroenterology.    Diagnosis: Acute cholecystitis  Assessment and Plan of Treatment: You had a CT (12/26) with distended GB with cholelithiasis and sludge. Your HIDA was POSITIVE for acute cholecystitis. Your condition was discussed with IR at length and you now have a PERC Asa drain which must be flushed with 5 cc of Normal Saline daily. You completed antibiotic course of Zosyn. The PERC ASA tube is to stay in until your are a surgical candidate for cholecystectomy to prevent recurrence.        PRINCIPAL DISCHARGE DIAGNOSIS  Diagnosis: Acute hypoxic respiratory failure  Assessment and Plan of Treatment: You were seen in the hospital with COVID complicated by sepsis second to acute asa and developed worsening respiratory failure second to pulmonary edema requiring intubation. You now have a tracheostomy and are breathing via a ventilator. Your completed antibiotics and COVID regimen. Please continue on respiratory care regimen as per facility protocol and follow up as outpatient with your lung doctor and PCP. Patient tolerating some breathing trials during daytime hours (settings 16/5/30%).   Volume Control settings: RR 16, Vt 400ml, PEEP 5, FiO2 30%      SECONDARY DISCHARGE DIAGNOSES  Diagnosis: GI bleed  Assessment and Plan of Treatment: You had an EGD with esophagitis and bleeding due to Dieulafoy's lesion s/p clips. Continue on PPI and carafate. Follow up outpatient with Gastroenterology.    Diagnosis: Vascular dementia  Assessment and Plan of Treatment: You were started on Valproic acid syrup for agitation/ delirium likely secondary to underlying vascular dementia. Please continue medication as instructed. You will need to have your VPA level monitored as outpatient and adjusted appropiately for low albumin by your PCP/ Neurologist. You will need your platelets, LFTs, albumin, ammonia, and VPA level monitored at rehab and adjusted accordingly. CHeck EKGs routinely to ensure no QTC prolongation.    Diagnosis: Aortic stenosis  Assessment and Plan of Treatment: Continue medication regimen to achieve strict blood pressure control and follow up with your PCP and heart doctor.    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 A CTA was performed showing mesenteric fat stranding associated with ascending/transverse colon. You had Diagnostic laparoscopy with small bowel and visible colon viable, some inflammation of omentum in RUQ. You then had SMA Angiogram and stent placed. Continue on ASA and Brilinta. Follow up as outpatient with PCP and Vascular surgery.    Diagnosis: Encounter for PEG (percutaneous endoscopic gastrostomy)  Assessment and Plan of Treatment: You now have a PEG aka feeding tube for medications and nutrition. Please continue to use for food and medications. Follow up as outpatient with Gastroenterology.    Diagnosis: Acute cholecystitis  Assessment and Plan of Treatment: You had a CT (12/26) with distended GB with cholelithiasis and sludge. Your HIDA was POSITIVE for acute cholecystitis. Your condition was discussed with IR at length and you now have a PERC Asa drain which must be flushed with 5 cc of Normal Saline daily. You completed antibiotic course of Zosyn. The PERC ASA tube is to stay in until your are a surgical candidate for cholecystectomy to prevent recurrence. The tube was placed on 1/26/24 and should be exchanged every 3 months by Interventional Radiology.    Diagnosis: Chronic HFrEF (heart failure with reduced ejection fraction)  Assessment and Plan of Treatment: You were treated for heart failure with diuresis. Continue Coreg, Furosemide, ASA. Hydralazine was stopped given low diastolic BPs.

## 2024-01-08 NOTE — DISCHARGE NOTE PROVIDER - HOSPITAL COURSE
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 Hospitalization with CTA demonstrating mesenteric fat stranding associated with ascending/transverse colon. Patient went to the OR and is S/p SMA angiography with stent placement with diagnostic laparoscopy 12/24, small bowel and visible colon viable, some inflammation of omentum in RUQ. Patient went to SICU post op for monitoring, patient was treated for covid with remdesivir. 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   He was transferred to the floor on 1/5  During admission patient seen by medicine who recommended b/l LE duplex, and cardiology for chest pain on admission. Patients EKG non ischemic , trop -sera  - echo with normal lv and moderate AS. Patient was seen by nephrology for MABLE.  He was seen by PT who recommended  Patient had a speech and swallow on 1/8 ??? 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 Course ultimately complicated by progressive WOB and O2 demand and patient intubated and transferred to MICU (1/22). Course further complicated by acute cholecystitis and s/p Perc Lynsey with IR on (1/26), HFrEF 38, pulmonary edema, superimposed PNA, failed extubation failed and prolonged vent time and s/p trach (2/13). Patient transferred to RCU (2/16)     While in RCU, s/p PEG (2/20) and tube feeds continued with tolerance. Course complicated by poor SBT trials. CXR with pulmonary edema and BL pleural effusions. Diuresis continued and course complicated by fever second to ESBL klebsiella PNA vs trachitis. Ertapenem completed and diuresis adjusted with improvement in SBT trial tolerance. Course further complicated by agitation however given prolonged QTC agents limited and ativan PO PRN doses continued with improvement.    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 Course ultimately complicated by progressive WOB and O2 demand and patient intubated and transferred to MICU (1/22). Course further complicated by acute cholecystitis and s/p Perc Lynsey with IR on (1/26), HFrEF 38, pulmonary edema, superimposed PNA, failed extubation failed and prolonged vent time and s/p trach (2/13). Patient transferred to RCU (2/16) and s/p PEG (2/20). Course complicated by volume overload and diuresis and GDMT continued and HFrEF 45 with improvement. Course further complicated by hospital delirium / exacerbation of underlying vascular dementia for which psych was consulted, recommends continuing on valproic syrup 250 mg bid and continuing to taper keppra until off as outpatient. 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 Course ultimately complicated by progressive WOB and O2 demand and patient intubated and transferred to MICU (1/22). Course further complicated by acute cholecystitis and s/p Perc Lynsey with IR on (1/26), HFrEF 38, pulmonary edema, superimposed PNA, failed extubation failed and prolonged vent time and s/p trach (2/13). Patient transferred to RCU (2/16) and s/p PEG (2/20). Course complicated by volume overload and diuresis and GDMT continued and HFrEF 45 with improvement. Course further complicated by hospital delirium / exacerbation of underlying vascular dementia for which psych was consulted, recommends continuing on valproic syrup 250 mg bid and continuing to taper keppra until off as outpatient. 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 Course ultimately complicated by progressive WOB and O2 demand and patient intubated and transferred to MICU (1/22). Course further complicated by acute cholecystitis and s/p Perc Lynsey with IR on (1/26), HFrEF 38, pulmonary edema, superimposed PNA, failed extubation and prolonged vent time and s/p trach (2/13). Patient transferred to RCU (2/16) and s/p PEG (2/20). Course complicated by volume overload and diuresis and GDMT continued and HFrEF 45 with improvement. Course further complicated by hospital delirium / exacerbation of underlying vascular dementia for which psych was consulted, recommends continuing on valproic syrup 125 mg TID and continuing to taper keppra until off as outpatient. Bedtime Ativan added to regimen. Patient's behavior improved. Tolerated some CPAP while admitted. Now stable for DC.    On 3/27/24, case d/w Dr. Lisker. Patient stable for discharge. 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 Course ultimately complicated by progressive WOB and O2 demand and patient intubated and transferred to MICU (1/22). Course further complicated by acute cholecystitis and s/p Perc Lynsey with IR on (1/26), HFrEF 38, pulmonary edema, superimposed PNA, failed extubation and prolonged vent time and s/p trach (2/13). Patient transferred to RCU (2/16) and s/p PEG (2/20). Course complicated by volume overload and diuresis and GDMT continued and HFrEF 45 with improvement. Course further complicated by hospital delirium / exacerbation of underlying vascular dementia for which psych was consulted, recommends continuing on valproic syrup 125 mg TID and continuing to taper keppra until off as outpatient. Bedtime Ativan added to regimen. Patient's behavior improved. Tolerated some CPAP while admitted. Now stable for DC.    On 3/27/24, case d/w Dr. Lisker. Patient stable for discharge.

## 2024-01-08 NOTE — DISCHARGE NOTE PROVIDER - NSDCMRMEDTOKEN_GEN_ALL_CORE_FT
aspirin 81 mg oral delayed release tablet: 1 tab(s) orally once a day  Basaglar KwikPen 100 units/mL subcutaneous solution: 23 unit(s) subcutaneous once a day 23-26 units in AM  Crestor 40 mg oral tablet: 1 tab(s) orally once a day  gabapentin 100 mg oral capsule: 1 cap(s) orally 2 times a day   Keppra 250 mg oral tablet: 1 tab(s) orally 2 times a day  Lexapro 10 mg oral tablet: 1 tab(s) orally once a day  memantine 5 mg oral tablet: 1 tab(s) orally 2 times a day  metoprolol succinate 25 mg oral tablet, extended release: 1 tab(s) orally 2 times a day Hold for SBP &lt; 100  NovoLOG FlexTouch 100 units/mL subcutaneous solution: 3 unit(s) subcutaneous 3 times a day for FS &gt; 160 with meals  ranolazine 500 mg oral granule, extended release: 500 milligram(s) orally 2 times a day  ticagrelor 60 mg oral tablet: 1 tab(s) orally 2 times a day  Tradjenta 5 mg oral tablet: 1 tab(s) orally once a day (at bedtime)   acetaminophen 160 mg/5 mL oral suspension: 20.31 milliliter(s) by gastrostomy tube every 6 hours as needed for Temp greater or equal to 38C (100.4F), Mild Pain (1 - 3), Moderate Pain (4 - 6)  aspirin 81 mg oral tablet, chewable: 1 tab(s) by gastrostomy tube once a day  carvedilol 6.25 mg oral tablet: 1 tab(s) by gastrostomy tube every 12 hours  doxazosin 2 mg oral tablet: 1 tab(s) by gastrostomy tube once a day (at bedtime)  escitalopram 5 mg/5 mL oral solution: 10 milliliter(s) by gastrostomy tube once a day  furosemide 20 mg oral tablet: 1 tab(s) by gastrostomy tube once a day  insulin glargine 100 units/mL subcutaneous solution: 14 unit(s) subcutaneous once a day  insulin lispro 100 units/mL injectable solution: 1 unit(s) injectable once a day 2 Unit(s) if Glucose 151 - 200  4 Unit(s) if Glucose 201 - 250  6 Unit(s) if Glucose 251 - 300  8 Unit(s) if Glucose 301 - 350  10 Unit(s) if Glucose 351 - 400  12 Unit(s) if Glucose Greater Than 400  Subcutaneous Every 6 hours  ipratropium-albuterol 0.5 mg-2.5 mg/3 mL inhalation solution: 3 milliliter(s) inhaled every 12 hours  levETIRAcetam 100 mg/mL oral solution: 1.25 milliliter(s) by gastrostomy tube 2 times a day  LORazepam 0.5 mg oral tablet: 1 tab(s) by gastrostomy tube once a day (at bedtime)  melatonin 3 mg oral tablet: 2 tab(s) by gastrostomy tube once a day (at bedtime)  ocular lubricant ophthalmic ointment: 1 application to each affected eye once a day (at bedtime)  ocular lubricant ophthalmic solution: 1 drop(s) to each affected eye 4 times a day  pantoprazole 40 mg oral granule, delayed release: 40 milligram(s) by gastrostomy tube 2 times a day  polyethylene glycol 3350 oral powder for reconstitution: 17 gram(s) by gastrostomy tube once a day  QUEtiapine 25 mg oral tablet: 3 tab(s) orally once a day (at bedtime)  QUEtiapine 50 mg oral tablet: 1 tab(s) orally once a day 1 tab daily at 8AM  senna (sennosides) 8.8 mg/5 mL oral syrup: 10 milliliter(s) by gastrostomy tube once a day (at bedtime)  sucralfate 1 g/10 mL oral suspension: 10 milliliter(s) by gastrostomy tube 2 times a day  ticagrelor 60 mg oral tablet: 1 tab(s) by gastrostomy tube 2 times a day  valproic acid 250 mg/5 mL oral liquid: 2.5 milliliter(s) by gastrostomy tube every 8 hours

## 2024-01-08 NOTE — DISCHARGE NOTE PROVIDER - CARE PROVIDER_API CALL
Ana Florentino  Vascular Surgery  [address] [facility], [address]  [place] [address]  Phone: [phone]  Fax: [phone]  Follow Up Time: 2 weeks   Ana Florentino  Vascular Surgery  [address] [facility], [address]  [place] [address]  Phone: [phone]  Fax: [phone]  Follow Up Time: 2 weeks   Ana Florentino  Vascular Surgery  [address] [facility], [address]  [place] [address]  Phone: [phone]  Fax: [phone]  Follow Up Time: 2 weeks    Hawk Lemon  Internal Medicine  [address] [place] [address]  Phone: [phone]  Fax: [phone]  Follow Up Time: 1 week    Aashish Boyer  Interventional Cardiology  [address] [place] [address]  Phone: [phone]  Fax: [phone]  Follow Up Time:    Ana Florentino  Vascular Surgery  [address] [facility], [address]  [place] [address]  Phone: [phone]  Fax: [phone]  Follow Up Time: 2 weeks    Hawk Lemon  Internal Medicine  [address] [place] [address]  Phone: [phone]  Fax: [phone]  Follow Up Time: 1 week    Aashish Boyer  Interventional Cardiology  [address] [place] [address]  Phone: [phone]  Fax: [phone]  Follow Up Time:    Ana Florentino  Vascular Surgery  [address] [facility], [address]  [place] [address]  Phone: [phone]  Fax: [phone]  Follow Up Time: 2 weeks    Hawk Lemon  Internal Medicine  [address] [place] [address]  Phone: [phone]  Fax: [phone]  Follow Up Time: 1 week    Aashish Boyer  Interventional Cardiology  [address] [place] [address]  Phone: [phone]  Fax: [phone]  Follow Up Time:     [facility] Inteventional Radiology at [facility],   [address]  [facility] [address]  2nd Floor Interventional Radiology Suite  Phone: [phone]  Fax: (   )    -  Follow Up Time:    Ana Florentino  Vascular Surgery  [address] [facility], [address]  [place] [address]  Phone: [phone]  Fax: [phone]  Follow Up Time: 2 weeks    Hawk Lemon  Internal Medicine  [address] [place] [address]  Phone: [phone]  Fax: [phone]  Follow Up Time: 1 week    Aashish Boyer  Interventional Cardiology  [address] [place] [address]  Phone: [phone]  Fax: [phone]  Follow Up Time:     [facility] Inteventional Radiology at [facility],   [address]  [facility] [address]  2nd Floor Interventional Radiology Suite  Phone: [phone]  Fax: (   )    -  Follow Up Time:    Ana Florentino  Vascular Surgery  [address] [facility], [address]  [place] [address]  Phone: [phone]  Fax: [phone]  Follow Up Time: 2 weeks    Hawk Lemon  Internal Medicine  [address] [place] [address]  Phone: [phone]  Fax: [phone]  Follow Up Time: 1 week    Aashish Boyer  Interventional Cardiology  [address] [place] [address]  Phone: [phone]  Fax: [phone]  Follow Up Time:     [facility] Inteventional Radiology at [facility],   [address] [place] [address]  2nd Floor Interventional Radiology Suite  Phone: [phone]  Fax: (   )    -  Follow Up Time:     Cory Delgado Flood  Surgery  [address] [place] [address]  Phone: [phone]  Fax: [phone]  Follow Up Time: 1 month   Ana Florentino  Vascular Surgery  [address] [facility], [address]  [place] [address]  Phone: [phone]  Fax: [phone]  Follow Up Time: 2 weeks    Hawk Lemon  Internal Medicine  [address] [place] [address]  Phone: [phone]  Fax: [phone]  Follow Up Time: 1 week    Aashish Boyer  Interventional Cardiology  [address] [place] [address]  Phone: [phone]  Fax: [phone]  Follow Up Time:     [facility] Inteventional Radiology at [facility],   [address] [place] [address]  2nd Floor Interventional Radiology Suite  Phone: [phone]  Fax: (   )    -  Follow Up Time:     Cory Delgado Flood  Surgery  [address] [place] [address]  Phone: [phone]  Fax: [phone]  Follow Up Time: 1 month

## 2024-01-08 NOTE — PROGRESS NOTE ADULT - SUBJECTIVE AND OBJECTIVE BOX
C TEAM / VASCULAR SURGERY PROGRESS NOTE     SUBJECTIVE: Patient seen and examined. Son is bedside. States that patient is feeling better but concerned about his difficulty swallowing. Patient remains with cough and congestion. Has not been ambulating. PT consulted.       MEDICATIONS  (STANDING):  albuterol/ipratropium for Nebulization 3 milliLiter(s) Nebulizer every 12 hours  amLODIPine   Tablet 5 milliGRAM(s) Oral daily  aspirin  chewable 81 milliGRAM(s) Oral daily  dornase cierra Solution 2.5 milliGRAM(s) Inhalation every 12 hours  escitalopram 10 milliGRAM(s) Oral daily  gabapentin Solution 100 milliGRAM(s) Oral two times a day  heparin   Injectable 5000 Unit(s) SubCutaneous every 8 hours  insulin glargine Injectable (LANTUS) 14 Unit(s) SubCutaneous at bedtime  insulin lispro (ADMELOG) corrective regimen sliding scale   SubCutaneous every 6 hours  levETIRAcetam   Injectable 250 milliGRAM(s) IV Push every 12 hours  melatonin 3 milliGRAM(s) Oral at bedtime  memantine 5 milliGRAM(s) Oral two times a day  metoprolol tartrate Injectable 5 milliGRAM(s) IV Push every 6 hours  mometasone 110 MICROgram(s) Inhaler 2 Puff(s) Inhalation every 12 hours  pantoprazole  Injectable 40 milliGRAM(s) IV Push every 12 hours  ranolazine 500 milliGRAM(s) Oral two times a day  sodium chloride 0.45% with potassium chloride 20 mEq/L 1000 milliLiter(s) (75 mL/Hr) IV Continuous <Continuous>  sucralfate 1 Gram(s) Oral every 12 hours  ticagrelor 60 milliGRAM(s) Enteral Tube every 12 hours    MEDICATIONS  (PRN):  acetaminophen   IVPB .. 1000 milliGRAM(s) IV Intermittent every 6 hours PRN Mild Pain (1 - 3), Moderate Pain (4 - 6)      Vital Signs Last 24 Hrs  T(C): 36.4 (08 Jan 2024 01:25), Max: 36.9 (07 Jan 2024 11:30)  T(F): 97.5 (08 Jan 2024 01:25), Max: 98.5 (07 Jan 2024 11:30)  HR: 79 (08 Jan 2024 01:25) (79 - 85)  BP: 137/63 (08 Jan 2024 01:25) (137/63 - 150/72)  BP(mean): --  RR: 18 (08 Jan 2024 01:25) (18 - 19)  SpO2: 100% (08 Jan 2024 01:25) (96% - 100%)    Parameters below as of 08 Jan 2024 01:25  Patient On (Oxygen Delivery Method): nasal cannula  O2 Flow (L/min): 4    I&O's Summary      PHYSICAL EXAM  GEN: No acute distress   LUNGS: NC, lots of secretions with mild wheezing.   CV: S1S2, reg rate, no JVD  ABD: Abdomen soft, NT, ND  EXT: No peripheral edema. Regular range of motion. Palpable DP on b/l LE.      LABS:                                   9.2    10.00 )-----------( 312      ( 07 Jan 2024 06:00 )             28.6     01-07    137  |  109<H>  |  21  ----------------------------<  301<H>  5.2   |  19<L>  |  1.35<H>    Ca    7.8<L>      07 Jan 2024 06:00  Phos  2.2     01-07  Mg     2.20     01-07          RADIOLOGY & ADDITIONAL STUDIES:

## 2024-01-08 NOTE — PROGRESS NOTE ADULT - ASSESSMENT
still with cough  NAD.     acetaminophen   IVPB .. 1000 milliGRAM(s) IV Intermittent every 6 hours PRN  albuterol/ipratropium for Nebulization 3 milliLiter(s) Nebulizer every 12 hours  amLODIPine   Tablet 5 milliGRAM(s) Oral daily  aspirin  chewable 81 milliGRAM(s) Oral daily  dornase cierra Solution 2.5 milliGRAM(s) Inhalation every 12 hours  escitalopram 10 milliGRAM(s) Oral daily  gabapentin Solution 100 milliGRAM(s) Oral two times a day  heparin   Injectable 5000 Unit(s) SubCutaneous every 8 hours  insulin glargine Injectable (LANTUS) 14 Unit(s) SubCutaneous at bedtime  insulin lispro (ADMELOG) corrective regimen sliding scale   SubCutaneous every 6 hours  levETIRAcetam   Injectable 250 milliGRAM(s) IV Push every 12 hours  melatonin 3 milliGRAM(s) Oral at bedtime  memantine 5 milliGRAM(s) Oral two times a day  metoprolol tartrate Injectable 5 milliGRAM(s) IV Push every 6 hours  mometasone 110 MICROgram(s) Inhaler 2 Puff(s) Inhalation every 12 hours  pantoprazole  Injectable 40 milliGRAM(s) IV Push every 12 hours  ranolazine 500 milliGRAM(s) Oral two times a day  sodium chloride 0.45% with potassium chloride 20 mEq/L 1000 milliLiter(s) IV Continuous <Continuous>  sodium phosphate 30 milliMole(s)/500 mL IVPB 30 milliMole(s) IV Intermittent once  sucralfate 1 Gram(s) Oral every 12 hours  ticagrelor 60 milliGRAM(s) Enteral Tube every 12 hours      VITAL:  T(C): , Max: 37 (01-06-24 @ 21:06)  T(F): , Max: 98.6 (01-06-24 @ 21:06)  HR: 86 (01-07-24 @ 05:45)  BP: 180/82 (01-07-24 @ 05:45)  BP(mean): --  RR: 19 (01-07-24 @ 05:45)  SpO2: 95% (01-07-24 @ 05:45)  Wt(kg): --    01-06-24 @ 07:01  -  01-07-24 @ 07:00  --------------------------------------------------------  IN: 185 mL / OUT: 0 mL / NET: 185 mL        PHYSICAL EXAM:  General: NAD; Alert  HEENT:  NCAT; PERRLA, +NGT  Neck: No JVD; supple  Respiratory:   Cardiac: RRR s1s2  Gastrointestinal: BS+, soft, NT/ND  Urologic: No delong  Extremities: No peripheral edema  Access:     LABS:                          9.2    10.00 )-----------( 312      ( 07 Jan 2024 06:00 )             28.6     Na(137)/K(5.2)/Cl(109)/HCO3(19)/BUN(21)/Cr(1.35)Glu(301)/Ca(7.8)/Mg(2.20)/PO4(2.2)    01-07 @ 06:00  Na(143)/K(5.0)/Cl(111)/HCO3(20)/BUN(20)/Cr(1.49)Glu(195)/Ca(8.2)/Mg(1.90)/PO4(2.7)    01-06 @ 07:35  Na(142)/K(5.1)/Cl(111)/HCO3(19)/BUN(22)/Cr(1.45)Glu(219)/Ca(8.0)/Mg(1.90)/PO4(3.2)    01-05 @ 22:19  Na(140)/K(5.0)/Cl(111)/HCO3(16)/BUN(24)/Cr(1.41)Glu(199)/Ca(8.1)/Mg(1.90)/PO4(3.3)    01-05 @ 01:00    Urinalysis Basic - ( 07 Jan 2024 06:00 )    Color: x / Appearance: x / SG: x / pH: x  Gluc: 301 mg/dL / Ketone: x  / Bili: x / Urobili: x   Blood: x / Protein: x / Nitrite: x   Leuk Esterase: x / RBC: x / WBC x   Sq Epi: x / Non Sq Epi: x / Bacteria: x            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 no labs for today   S/p CT with contrast   2 Lytes- hypo phos yesterday --increase the PHOS contents in tube feedings if possible   3 Hypocalcemia S/p calcium gluconate 1g iv x 1 (1/5/24) corrected ~8.8mg/dl  4 CV - BP elevated, on Lopressor 5 mg IV q6h, and amlodipine 5 mg daily ; consider clonidine patch 0.1   5 Vasc - s/p SMA stent placement;   6 Sx - s/p HIDA + for acute asa, medical management, remains on tube feeding.  7 GI - s/p EGD clipping of bleeding duodenal ulcers - on protonix gtt  8 Anemia- hgb improving stable           [facility]  Office: [phone]     still with cough  NAD.     acetaminophen   IVPB .. 1000 milliGRAM(s) IV Intermittent every 6 hours PRN  albuterol/ipratropium for Nebulization 3 milliLiter(s) Nebulizer every 12 hours  amLODIPine   Tablet 5 milliGRAM(s) Oral daily  aspirin  chewable 81 milliGRAM(s) Oral daily  dornase cierra Solution 2.5 milliGRAM(s) Inhalation every 12 hours  escitalopram 10 milliGRAM(s) Oral daily  gabapentin Solution 100 milliGRAM(s) Oral two times a day  heparin   Injectable 5000 Unit(s) SubCutaneous every 8 hours  insulin glargine Injectable (LANTUS) 14 Unit(s) SubCutaneous at bedtime  insulin lispro (ADMELOG) corrective regimen sliding scale   SubCutaneous every 6 hours  levETIRAcetam   Injectable 250 milliGRAM(s) IV Push every 12 hours  melatonin 3 milliGRAM(s) Oral at bedtime  memantine 5 milliGRAM(s) Oral two times a day  metoprolol tartrate Injectable 5 milliGRAM(s) IV Push every 6 hours  mometasone 110 MICROgram(s) Inhaler 2 Puff(s) Inhalation every 12 hours  pantoprazole  Injectable 40 milliGRAM(s) IV Push every 12 hours  ranolazine 500 milliGRAM(s) Oral two times a day  sodium chloride 0.45% with potassium chloride 20 mEq/L 1000 milliLiter(s) IV Continuous <Continuous>  sodium phosphate 30 milliMole(s)/500 mL IVPB 30 milliMole(s) IV Intermittent once  sucralfate 1 Gram(s) Oral every 12 hours  ticagrelor 60 milliGRAM(s) Enteral Tube every 12 hours      VITAL:  T(C): , Max: 37 (01-06-24 @ 21:06)  T(F): , Max: 98.6 (01-06-24 @ 21:06)  HR: 86 (01-07-24 @ 05:45)  BP: 180/82 (01-07-24 @ 05:45)  BP(mean): --  RR: 19 (01-07-24 @ 05:45)  SpO2: 95% (01-07-24 @ 05:45)  Wt(kg): --    01-06-24 @ 07:01  -  01-07-24 @ 07:00  --------------------------------------------------------  IN: 185 mL / OUT: 0 mL / NET: 185 mL        PHYSICAL EXAM:  General: NAD; Alert  HEENT:  NCAT; PERRLA, +NGT  Neck: No JVD; supple  Respiratory:   Cardiac: RRR s1s2  Gastrointestinal: BS+, soft, NT/ND  Urologic: No delong  Extremities: No peripheral edema  Access:     LABS:                          9.2    10.00 )-----------( 312      ( 07 Jan 2024 06:00 )             28.6     Na(137)/K(5.2)/Cl(109)/HCO3(19)/BUN(21)/Cr(1.35)Glu(301)/Ca(7.8)/Mg(2.20)/PO4(2.2)    01-07 @ 06:00  Na(143)/K(5.0)/Cl(111)/HCO3(20)/BUN(20)/Cr(1.49)Glu(195)/Ca(8.2)/Mg(1.90)/PO4(2.7)    01-06 @ 07:35  Na(142)/K(5.1)/Cl(111)/HCO3(19)/BUN(22)/Cr(1.45)Glu(219)/Ca(8.0)/Mg(1.90)/PO4(3.2)    01-05 @ 22:19  Na(140)/K(5.0)/Cl(111)/HCO3(16)/BUN(24)/Cr(1.41)Glu(199)/Ca(8.1)/Mg(1.90)/PO4(3.3)    01-05 @ 01:00    Urinalysis Basic - ( 07 Jan 2024 06:00 )    Color: x / Appearance: x / SG: x / pH: x  Gluc: 301 mg/dL / Ketone: x  / Bili: x / Urobili: x   Blood: x / Protein: x / Nitrite: x   Leuk Esterase: x / RBC: x / WBC x   Sq Epi: x / Non Sq Epi: x / Bacteria: x            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 no labs for today   S/p CT with contrast   2 Lytes- hypo phos yesterday --increase the PHOS contents in tube feedings if possible   3 Hypocalcemia S/p calcium gluconate 1g iv x 1 (1/5/24) corrected ~8.8mg/dl  4 CV - BP elevated, on Lopressor 5 mg IV q6h, and amlodipine 5 mg daily ; consider clonidine patch 0.1   5 Vasc - s/p SMA stent placement;   6 Sx - s/p HIDA + for acute asa, medical management, remains on tube feeding.  7 GI - s/p EGD clipping of bleeding duodenal ulcers - on protonix gtt  8 Anemia- hgb improving stable           [facility]  Office: [phone]     still with cough  NAD.     acetaminophen   IVPB .. 1000 milliGRAM(s) IV Intermittent every 6 hours PRN  albuterol/ipratropium for Nebulization 3 milliLiter(s) Nebulizer every 12 hours  amLODIPine   Tablet 5 milliGRAM(s) Oral daily  aspirin  chewable 81 milliGRAM(s) Oral daily  dornase cierra Solution 2.5 milliGRAM(s) Inhalation every 12 hours  escitalopram 10 milliGRAM(s) Oral daily  gabapentin Solution 100 milliGRAM(s) Oral two times a day  heparin   Injectable 5000 Unit(s) SubCutaneous every 8 hours  insulin glargine Injectable (LANTUS) 14 Unit(s) SubCutaneous at bedtime  insulin lispro (ADMELOG) corrective regimen sliding scale   SubCutaneous every 6 hours  levETIRAcetam   Injectable 250 milliGRAM(s) IV Push every 12 hours  melatonin 3 milliGRAM(s) Oral at bedtime  memantine 5 milliGRAM(s) Oral two times a day  metoprolol tartrate Injectable 5 milliGRAM(s) IV Push every 6 hours  mometasone 110 MICROgram(s) Inhaler 2 Puff(s) Inhalation every 12 hours  pantoprazole  Injectable 40 milliGRAM(s) IV Push every 12 hours  ranolazine 500 milliGRAM(s) Oral two times a day  sodium chloride 0.45% with potassium chloride 20 mEq/L 1000 milliLiter(s) IV Continuous <Continuous>  sodium phosphate 30 milliMole(s)/500 mL IVPB 30 milliMole(s) IV Intermittent once  sucralfate 1 Gram(s) Oral every 12 hours  ticagrelor 60 milliGRAM(s) Enteral Tube every 12 hours      VITAL:  T(C): , Max: 37 (01-06-24 @ 21:06)  T(F): , Max: 98.6 (01-06-24 @ 21:06)  HR: 86 (01-07-24 @ 05:45)  BP: 180/82 (01-07-24 @ 05:45)  BP(mean): --  RR: 19 (01-07-24 @ 05:45)  SpO2: 95% (01-07-24 @ 05:45)  Wt(kg): --    01-06-24 @ 07:01  -  01-07-24 @ 07:00  --------------------------------------------------------  IN: 185 mL / OUT: 0 mL / NET: 185 mL        PHYSICAL EXAM:  General: NAD; Alert  HEENT:  NCAT; PERRLA, +NGT  Neck: No JVD; supple  Respiratory:   Cardiac: RRR s1s2  Gastrointestinal: BS+, soft, NT/ND  Urologic: No delong  Extremities: No peripheral edema  Access:     LABS:                          9.2    10.00 )-----------( 312      ( 07 Jan 2024 06:00 )             28.6     Na(137)/K(5.2)/Cl(109)/HCO3(19)/BUN(21)/Cr(1.35)Glu(301)/Ca(7.8)/Mg(2.20)/PO4(2.2)    01-07 @ 06:00  Na(143)/K(5.0)/Cl(111)/HCO3(20)/BUN(20)/Cr(1.49)Glu(195)/Ca(8.2)/Mg(1.90)/PO4(2.7)    01-06 @ 07:35  Na(142)/K(5.1)/Cl(111)/HCO3(19)/BUN(22)/Cr(1.45)Glu(219)/Ca(8.0)/Mg(1.90)/PO4(3.2)    01-05 @ 22:19  Na(140)/K(5.0)/Cl(111)/HCO3(16)/BUN(24)/Cr(1.41)Glu(199)/Ca(8.1)/Mg(1.90)/PO4(3.3)    01-05 @ 01:00    Urinalysis Basic - ( 07 Jan 2024 06:00 )    Color: x / Appearance: x / SG: x / pH: x  Gluc: 301 mg/dL / Ketone: x  / Bili: x / Urobili: x   Blood: x / Protein: x / Nitrite: x   Leuk Esterase: x / RBC: x / WBC x   Sq Epi: x / Non Sq Epi: x / Bacteria: x            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 no labs for today   S/p CT with contrast   2 Lytes- hypo phos yesterday --give  15 mmole IVPB phos   3 Hypocalcemia S/p calcium gluconate 1g iv x 1 (1/5/24) corrected ~8.8mg/dl  4 CV - BP elevated, on Lopressor 5 mg IV q6h, and amlodipine 5 mg daily ; consider clonidine patch 0.1   5 Vasc - s/p SMA stent placement;   6 Sx - s/p HIDA + for acute asa, medical management, remains on tube feeding.  7 GI - s/p EGD clipping of bleeding duodenal ulcers   8 Anemia- hgb improving stable         d-w team  Elsa Nunez  [facility]  Office: [phone]     still with cough  NAD.     acetaminophen   IVPB .. 1000 milliGRAM(s) IV Intermittent every 6 hours PRN  albuterol/ipratropium for Nebulization 3 milliLiter(s) Nebulizer every 12 hours  amLODIPine   Tablet 5 milliGRAM(s) Oral daily  aspirin  chewable 81 milliGRAM(s) Oral daily  dornase cierra Solution 2.5 milliGRAM(s) Inhalation every 12 hours  escitalopram 10 milliGRAM(s) Oral daily  gabapentin Solution 100 milliGRAM(s) Oral two times a day  heparin   Injectable 5000 Unit(s) SubCutaneous every 8 hours  insulin glargine Injectable (LANTUS) 14 Unit(s) SubCutaneous at bedtime  insulin lispro (ADMELOG) corrective regimen sliding scale   SubCutaneous every 6 hours  levETIRAcetam   Injectable 250 milliGRAM(s) IV Push every 12 hours  melatonin 3 milliGRAM(s) Oral at bedtime  memantine 5 milliGRAM(s) Oral two times a day  metoprolol tartrate Injectable 5 milliGRAM(s) IV Push every 6 hours  mometasone 110 MICROgram(s) Inhaler 2 Puff(s) Inhalation every 12 hours  pantoprazole  Injectable 40 milliGRAM(s) IV Push every 12 hours  ranolazine 500 milliGRAM(s) Oral two times a day  sodium chloride 0.45% with potassium chloride 20 mEq/L 1000 milliLiter(s) IV Continuous <Continuous>  sodium phosphate 30 milliMole(s)/500 mL IVPB 30 milliMole(s) IV Intermittent once  sucralfate 1 Gram(s) Oral every 12 hours  ticagrelor 60 milliGRAM(s) Enteral Tube every 12 hours      VITAL:  T(C): , Max: 37 (01-06-24 @ 21:06)  T(F): , Max: 98.6 (01-06-24 @ 21:06)  HR: 86 (01-07-24 @ 05:45)  BP: 180/82 (01-07-24 @ 05:45)  BP(mean): --  RR: 19 (01-07-24 @ 05:45)  SpO2: 95% (01-07-24 @ 05:45)  Wt(kg): --    01-06-24 @ 07:01  -  01-07-24 @ 07:00  --------------------------------------------------------  IN: 185 mL / OUT: 0 mL / NET: 185 mL        PHYSICAL EXAM:  General: NAD; Alert  HEENT:  NCAT; PERRLA, +NGT  Neck: No JVD; supple  Respiratory:   Cardiac: RRR s1s2  Gastrointestinal: BS+, soft, NT/ND  Urologic: No delong  Extremities: No peripheral edema  Access:     LABS:                          9.2    10.00 )-----------( 312      ( 07 Jan 2024 06:00 )             28.6     Na(137)/K(5.2)/Cl(109)/HCO3(19)/BUN(21)/Cr(1.35)Glu(301)/Ca(7.8)/Mg(2.20)/PO4(2.2)    01-07 @ 06:00  Na(143)/K(5.0)/Cl(111)/HCO3(20)/BUN(20)/Cr(1.49)Glu(195)/Ca(8.2)/Mg(1.90)/PO4(2.7)    01-06 @ 07:35  Na(142)/K(5.1)/Cl(111)/HCO3(19)/BUN(22)/Cr(1.45)Glu(219)/Ca(8.0)/Mg(1.90)/PO4(3.2)    01-05 @ 22:19  Na(140)/K(5.0)/Cl(111)/HCO3(16)/BUN(24)/Cr(1.41)Glu(199)/Ca(8.1)/Mg(1.90)/PO4(3.3)    01-05 @ 01:00    Urinalysis Basic - ( 07 Jan 2024 06:00 )    Color: x / Appearance: x / SG: x / pH: x  Gluc: 301 mg/dL / Ketone: x  / Bili: x / Urobili: x   Blood: x / Protein: x / Nitrite: x   Leuk Esterase: x / RBC: x / WBC x   Sq Epi: x / Non Sq Epi: x / Bacteria: x            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 no labs for today   S/p CT with contrast   2 Lytes- hypo phos yesterday --give  15 mmole IVPB phos   3 Hypocalcemia S/p calcium gluconate 1g iv x 1 (1/5/24) corrected ~8.8mg/dl  4 CV - BP elevated, on Lopressor 5 mg IV q6h, and amlodipine 5 mg daily ; consider clonidine patch 0.1   5 Vasc - s/p SMA stent placement;   6 Sx - s/p HIDA + for acute asa, medical management, remains on tube feeding.  7 GI - s/p EGD clipping of bleeding duodenal ulcers   8 Anemia- hgb improving stable         d-w team  Elsa Nunez  [facility]  Office: [phone]

## 2024-01-08 NOTE — SWALLOW VFSS/MBS ASSESSMENT ADULT - ADDITIONAL RECOMMENDATIONS
This service to follow for diet tolerance/advancement/swallow therapy. Medical Team advised to Reconsult if there is a change in medical status. Patient benefit swallow therapy pending discharge plans (e.g. Rehab Center vs Home Care vs OutPatient at [facility] [phone]) This service to follow for diet tolerance/advancement/swallow therapy. Medical Team advised to Reconsult if there is a change in medical status. Patient benefit swallow therapy pending discharge plans (e.g. Rehab Center vs Home Care vs OutPatient at [facility] [phone])

## 2024-01-08 NOTE — PROGRESS NOTE ADULT - ASSESSMENT
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 CTA demonstrating mesenteric fat stranding associated with ascending/transverse colon. S/p SMA angiography with stent placement with diagnostic laparoscopy 12/24, small bowel and visible colon viable, some inflammation of omentum in RUQ. Course c/b GI bleed, s/p scope on 1/2 with GI with clips placed.     Plan:   - Pain control PRN  - continue keppra  - chest PT / OOB to chair  - norvasc, metoprolol   - cinesphogram today   - Diet: NPO 2/2 dysphagia, with KO feeding tube supplementation  - acute cholecystitis s/p IV abx, tolerating abx.  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 CTA demonstrating mesenteric fat stranding associated with ascending/transverse colon. S/p SMA angiography with stent placement with diagnostic laparoscopy 12/24, small bowel and visible colon viable, some inflammation of omentum in RUQ. Course c/b GI bleed, s/p scope on 1/2 with GI with clips placed.     Plan:   - Pain control PRN  - continue keppra  - chest PT / OOB to chair  - norvasc, metoprolol   - cinesphogram today   - Diet: NPO 2/2 dysphagia, with KO feeding tube supplementation  - acute cholecystitis s/p IV abx, tolerating abx.  - protonix bid  - monitor urine output  - ASA, Brillinta, SQH  - Medicine following, will transfer to medical service today        C Team Surgery   [id number]

## 2024-01-08 NOTE — SWALLOW VFSS/MBS ASSESSMENT ADULT - ROSENBEK'S PENETRATION ASPIRATION SCALE
(3) contrast remains above the vocal cords, visible residue remains (penetration) (7) contrast passes glottis, visible subglottic residue remains despite patient’s response (aspiration) (1) no aspiration, contrast does not enter airway

## 2024-01-08 NOTE — CHART NOTE - NSCHARTNOTEFT_GEN_A_CORE
Sign-Out Note     Patient transferred to Dr. Dinero's service earlier today. SIgn-out given to [facility] covering, Betty Candelaria, [id number].     Patient had Duplex B/L LE revealing no evidence of DVTs, as well as cinesophogram which revealed aspiration of thin liquids. Diet was changed to recommendations of the speech pathologist - Pureed diet with moderately thickened liquids. Feeding/Swallowing Guidelines: Upright position, Puree and Moderately thick liquids via Teaspoon, two swallow per bolus; alternate puree and moderately thick liquids.     Patient stable from vascular standpoint. WIll follow as consultant.       XIN Samaniego Team Surgery   [id number] Sign-Out Note     Patient transferred to Dr. Dinero's service earlier today. SIgn-out given to [facility] covering, Betty Candelaria, [id number].     Patient had Duplex B/L LE revealing no evidence of DVTs, as well as cinesophogram which revealed aspiration of thin liquids. Diet was changed to recommendations of the speech pathologist - Pureed diet with moderately thickened liquids. Feeding/Swallowing Guidelines: Upright position, Puree and Moderately thick liquids via Teaspoon, two swallow per bolus; alternate puree and moderately thick liquids.     Patient stable from vascular standpoint. WIll follow as consultant.       XIN Samaniego Team Surgery   [id number]

## 2024-01-08 NOTE — PROGRESS NOTE ADULT - ASSESSMENT
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 CTA demonstrating mesenteric fat stranding associated with ascending/transverse colon. S/p SMA angiography with stent placement with diagnostic laparoscopy 12/24, small bowel and visible colon viable, some inflammation of omentum in RUQ. Course c/b GI bleed, s/p scope on 1/2 with GI with clips placed. Patient transferred overnight from SICU to the floor in clinically stable condition.       Problem/Recommendation - 1:  ·  Problem: Type 2 diabetes mellitus with hyperglycemia.   ·  Recommendation: On NGT  and D5 .  Sugars high so  DC D5 IV   Continue Lantus and SSI.     Problem/Recommendation - 2:  ·  Problem: Acute renal failure superimposed on chronic kidney disease.   ·  Recommendation: Creatinine improving      Problem/Recommendation - 3:  ·  Problem: CAD (coronary artery disease).   ·  Recommendation: S/P CABG and Stents. On DAPT and BB.  cardiology help appreciated.     Problem/Recommendation - 4:  ·  Problem: Essential hypertension.   ·  Recommendation: BP meds with hold parameters.     Problem/Recommendation - 5:  ·  Problem: GI bleed.   ·  Recommendation: On PPI.  S/P EGD.   Impression:          - NG tube removed before endoscopy                       - LA Grade B esophagitis.                       - Bleeding Dieulafoy's lesion of the posterior wall of                        the gastric body. This is likely the source of                        clinically significant bleeding. Hemostasis achieved                        using 2 MR conditional hemoclips.                       - Bleeding edematous mucosa and nonbleeding clean based                        gastric ulcers in the posterior wall of the gastric                        body. These are likely due to NG tube irritation.                        Hemostasis achieved using 1 MR conditional hemoclip.                       - Non-bleeding small clean based duodenal ulcer                       - No specimens collected.    < end of copied text >.     Problem/Recommendation - 6:  ·  Problem: Myoclonic jerking.   ·  Recommendation: On keppra.     Problem/Recommendation - 7:  ·  Problem: Dysphagia.   ·  Recommendation: Failed Speech swallow so  awaiting  cineesophagogram results.   On NGT Feeding.     Problem/Recommendation - 8:  ·  Problem: Abdominal distension.   ·  Recommendation: S/P  SMA angiography with stent placement with diagnostic laparoscopy 12/24,  Vascular following.     Problem/Recommendation - 9:  ·  Problem: Anemia due to acute blood loss.   ·  Recommendation: S/P PRBC.   HH stable now.     Problem/Recommendation - 10:  ·  Problem: Toxic metabolic encephalopathy.   ·  Recommendation: Mental status waxes and wanes .     Problem/Recommendation - 11:  ·  Problem: 2019 novel coronavirus disease (COVID-19).   ·  Recommendation: S/P Renmdisvir.     Problem/Recommendation - 12:  ·  Problem: Leg pain .   ·  Recommendation: Please check Doppler to R/O DVT.  D/W Vascular team .       D/W patient son and grand son.

## 2024-01-08 NOTE — DISCHARGE NOTE PROVIDER - DETAILS OF MALNUTRITION DIAGNOSIS/DIAGNOSES
This patient has been assessed with a concern for Malnutrition and was treated during this hospitalization for the following Nutrition diagnosis/diagnoses:     -  01/03/2024: Moderate protein-calorie malnutrition

## 2024-01-08 NOTE — PROGRESS NOTE ADULT - SUBJECTIVE AND OBJECTIVE BOX
Aashish Boyer MD  Interventional Cardiology / Advance Heart Failure and Cardiac Transplant Specialist  [place] Office : [address] [place] [address]  Tel:   [place] Office : [address] [facility] [address]  Tel: [phone]      Subjective/Overnight events: Pt is lying in bed no chest pains   	  MEDICATIONS:  amLODIPine   Tablet 5 milliGRAM(s) Oral daily  aspirin  chewable 81 milliGRAM(s) Oral daily  heparin   Injectable 5000 Unit(s) SubCutaneous every 8 hours  metoprolol tartrate Injectable 5 milliGRAM(s) IV Push every 6 hours  ranolazine 500 milliGRAM(s) Oral two times a day  ticagrelor 60 milliGRAM(s) Enteral Tube every 12 hours      albuterol/ipratropium for Nebulization 3 milliLiter(s) Nebulizer every 12 hours  dornase cierra Solution 2.5 milliGRAM(s) Inhalation every 12 hours  mometasone 110 MICROgram(s) Inhaler 2 Puff(s) Inhalation every 12 hours    acetaminophen   IVPB .. 1000 milliGRAM(s) IV Intermittent every 6 hours PRN  escitalopram 10 milliGRAM(s) Oral daily  gabapentin Solution 100 milliGRAM(s) Oral two times a day  levETIRAcetam   Injectable 250 milliGRAM(s) IV Push every 12 hours  melatonin 3 milliGRAM(s) Oral at bedtime  memantine 5 milliGRAM(s) Oral two times a day    pantoprazole  Injectable 40 milliGRAM(s) IV Push every 12 hours  sucralfate 1 Gram(s) Oral every 12 hours    insulin glargine Injectable (LANTUS) 14 Unit(s) SubCutaneous at bedtime  insulin lispro (ADMELOG) corrective regimen sliding scale   SubCutaneous every 6 hours        PAST MEDICAL/SURGICAL HISTORY  PAST MEDICAL & SURGICAL HISTORY:  Hyperlipemia      Hypertension      Coronary Artery Disease      Diabetes Mellitus Type II      Stented Coronary Artery  total 5 stents, last stent 5/2019      Neuropathy      Myocardial infarction      Stroke  mild left facial numbness   no other residuals verbalized      Myoclonic jerking      Stage 3 chronic kidney disease      History of Cataract Extraction      Hx of CABG      H/O coronary angiogram      S/P coronary artery stent placement  1/6/09      S/P placement of cardiac pacemaker          SOCIAL HISTORY: Substance Use (street drugs): ( x ) never used  (  ) other:    FAMILY HISTORY:  No pertinent family history in first degree relatives            PHYSICAL EXAM:  T(C): 36.4 (01-08-24 @ 14:34), Max: 36.9 (01-07-24 @ 18:34)  HR: 88 (01-08-24 @ 14:34) (79 - 88)  BP: 151/68 (01-08-24 @ 14:34) (137/63 - 151/68)  RR: 18 (01-08-24 @ 14:34) (18 - 19)  SpO2: 96% (01-08-24 @ 14:34) (96% - 100%)  Wt(kg): --  I&O's Summary        GENERAL: NAD  EYES:  conjunctiva and sclera clear  ENMT: No tonsillar erythema, exudates, or enlargement  Cardiovascular: Normal S1 S2, No JVD, No murmurs, No edema  Respiratory: Lungs clear to auscultation	  Gastrointestinal:  Soft  Extremities: No edema                                     9.2    10.00 )-----------( 312      ( 07 Jan 2024 06:00 )             28.6     01-07    137  |  109<H>  |  21  ----------------------------<  301<H>  5.2   |  19<L>  |  1.35<H>    Ca    7.8<L>      07 Jan 2024 06:00  Phos  2.2     01-07  Mg     2.20     01-07      proBNP:   Lipid Profile:   HgA1c:   TSH:     Consultant(s) Notes Reviewed:  [x ] YES  [ ] NO    Care Discussed with Consultants/Other Providers [ x] YES  [ ] NO    Imaging Personally Reviewed independently:  [x] YES  [ ] NO    All labs, radiologic studies, vitals, orders and medications list reviewed. Patient is seen and examined at bedside. Case discussed with medical team.         Aashish Boyer MD  Interventional Cardiology / Advance Heart Failure and Cardiac Transplant Specialist  [place] Office : [address] [place] [address]  Tel:   [place] Office : [address] [facility] [address]  Tel: [phone]      Subjective/Overnight events: Pt is lying in bed no chest pains   	  MEDICATIONS:  amLODIPine   Tablet 5 milliGRAM(s) Oral daily  aspirin  chewable 81 milliGRAM(s) Oral daily  heparin   Injectable 5000 Unit(s) SubCutaneous every 8 hours  metoprolol tartrate Injectable 5 milliGRAM(s) IV Push every 6 hours  ranolazine 500 milliGRAM(s) Oral two times a day  ticagrelor 60 milliGRAM(s) Enteral Tube every 12 hours      albuterol/ipratropium for Nebulization 3 milliLiter(s) Nebulizer every 12 hours  dornase cierra Solution 2.5 milliGRAM(s) Inhalation every 12 hours  mometasone 110 MICROgram(s) Inhaler 2 Puff(s) Inhalation every 12 hours    acetaminophen   IVPB .. 1000 milliGRAM(s) IV Intermittent every 6 hours PRN  escitalopram 10 milliGRAM(s) Oral daily  gabapentin Solution 100 milliGRAM(s) Oral two times a day  levETIRAcetam   Injectable 250 milliGRAM(s) IV Push every 12 hours  melatonin 3 milliGRAM(s) Oral at bedtime  memantine 5 milliGRAM(s) Oral two times a day    pantoprazole  Injectable 40 milliGRAM(s) IV Push every 12 hours  sucralfate 1 Gram(s) Oral every 12 hours    insulin glargine Injectable (LANTUS) 14 Unit(s) SubCutaneous at bedtime  insulin lispro (ADMELOG) corrective regimen sliding scale   SubCutaneous every 6 hours        PAST MEDICAL/SURGICAL HISTORY  PAST MEDICAL & SURGICAL HISTORY:  Hyperlipemia      Hypertension      Coronary Artery Disease      Diabetes Mellitus Type II      Stented Coronary Artery  total 5 stents, last stent 5/2019      Neuropathy      Myocardial infarction      Stroke  mild left facial numbness   no other residuals verbalized      Myoclonic jerking      Stage 3 chronic kidney disease      History of Cataract Extraction      Hx of CABG      H/O coronary angiogram      S/P coronary artery stent placement  1/6/09      S/P placement of cardiac pacemaker          SOCIAL HISTORY: Substance Use (street drugs): ( x ) never used  (  ) other:    FAMILY HISTORY:  No pertinent family history in first degree relatives            PHYSICAL EXAM:  T(C): 36.4 (01-08-24 @ 14:34), Max: 36.9 (01-07-24 @ 18:34)  HR: 88 (01-08-24 @ 14:34) (79 - 88)  BP: 151/68 (01-08-24 @ 14:34) (137/63 - 151/68)  RR: 18 (01-08-24 @ 14:34) (18 - 19)  SpO2: 96% (01-08-24 @ 14:34) (96% - 100%)  Wt(kg): --  I&O's Summary        GENERAL: NAD  EYES:  conjunctiva and sclera clear  ENMT: No tonsillar erythema, exudates, or enlargement  Cardiovascular: Normal S1 S2, No JVD, No murmurs, No edema  Respiratory: Lungs clear to auscultation	  Gastrointestinal:  Soft  Extremities: No edema                                     9.2    10.00 )-----------( 312      ( 07 Jan 2024 06:00 )             28.6     01-07    137  |  109<H>  |  21  ----------------------------<  301<H>  5.2   |  19<L>  |  1.35<H>    Ca    7.8<L>      07 Jan 2024 06:00  Phos  2.2     01-07  Mg     2.20     01-07      proBNP:   Lipid Profile:   HgA1c:   TSH:     Consultant(s) Notes Reviewed:  [x ] YES  [ ] NO    Care Discussed with Consultants/Other Providers [ x] YES  [ ] NO    Imaging Personally Reviewed independently:  [x] YES  [ ] NO    All labs, radiologic studies, vitals, orders and medications list reviewed. Patient is seen and examined at bedside. Case discussed with medical team.

## 2024-01-08 NOTE — DISCHARGE NOTE PROVIDER - CARE PROVIDERS DIRECT ADDRESSES
,[email or web address] ,[email or web address] ,[email or web address] ,[email or web address] ,[email or web address],DirectAddress_Unknown ,[email or web address],[email or web address],DirectAddress_Unknown,[email or web address]

## 2024-01-08 NOTE — DISCHARGE NOTE PROVIDER - INSTRUCTIONS
glucerna 1.5 mike, total vol 24 hours 1200 mL, continous tube feed rate 50 mL/ HR   banatrol TF Qty per day 2  glucerna 1.5 mike, total vol 24 hours 1200 mL, continous tube feed rate 50 mL/ HR

## 2024-01-08 NOTE — DISCHARGE NOTE PROVIDER - NPI NUMBER (FOR SYSADMIN USE ONLY) :
[[id number]] [[id number]] [[id number]],[[id number]],[[id number]] [[id number]],[[id number]],[[id number]] [[id number]],[[id number]],[[id number]],[UNKNOWN] [[id number]],[[id number]],[[id number]],[UNKNOWN] [[id number]],[[id number]],[[id number]],[UNKNOWN],[[id number]] [[id number]],[[id number]],[[id number]],[UNKNOWN],[[id number]]

## 2024-01-08 NOTE — DISCHARGE NOTE PROVIDER - NSDCFUADDINST_GEN_ALL_CORE_FT
Topical Wound Recommendations  - Tracheostomy: Cleanse around trach site with NS. Pat dry. Apply Silicone foam dressing without border beneath trach collar, cut mid dressing to "Y" shape. Change foam dressing every shift and prn. Change trach ties every 3 days.   - Sacrum to BL buttocks: Cleanse with skin cleanser, pat dry. Apply TRIAD paste twice a day and PRN with incontinent episodes. With episodes of incontinence only remove soiled layer of Triad, then reinforce with thin layer.   - L 5th lateral metatarsal: Cleanse with NS, pat dry. Paint with betadine, allow to dry. Cover with 4x4 gauze. Re-apply daily and PRN if soiled.

## 2024-01-08 NOTE — DISCHARGE NOTE PROVIDER - NSFOLLOWUPCLINICS_GEN_ALL_ED_FT
[facility]  Gastroenterology  [address] [place] [address] [address]  [place] [address]  Phone: [phone]  Fax:      [facility]  Gastroenterology  [address] [place] [address] [address]  [place] [address]  Phone: [phone]  Fax:

## 2024-01-08 NOTE — SWALLOW VFSS/MBS ASSESSMENT ADULT - COMMENTS
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 CTA demonstrating mesenteric fat stranding associated with ascending/transverse colon. S/p SMA angiography with stent placement with diagnostic laparoscopy 12/24, small bowel and visible colon viable, some inflammation of omentum in RUQ. Course c/b GI bleed, s/p scope on 1/2 with GI with clips placed. Patient transferred overnight from SICU to the floor in clinically stable condition.    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 CTA demonstrating mesenteric fat stranding associated with ascending/transverse colon. S/p SMA angiography with stent placement with diagnostic laparoscopy 12/24, small bowel and visible colon viable, some inflammation of omentum in RUQ. Course c/b GI bleed, s/p scope on 1/2 with GI with clips placed.     Of Note: Patient was seen for a Clinical Swallow Eval 1/7/2024 (See Consult).    Patient arrived to Radiology for Cinesophagram. 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 CTA demonstrating mesenteric fat stranding associated with ascending/transverse colon. S/p SMA angiography with stent placement with diagnostic laparoscopy 12/24, small bowel and visible colon viable, some inflammation of omentum in RUQ. Course c/b GI bleed, s/p scope on 1/2 with GI with clips placed. Patient transferred overnight from SICU to the floor in clinically stable condition.    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 CTA demonstrating mesenteric fat stranding associated with ascending/transverse colon. S/p SMA angiography with stent placement with diagnostic laparoscopy 12/24, small bowel and visible colon viable, some inflammation of omentum in RUQ. Course c/b GI bleed, s/p scope on 1/2 with GI with clips placed.     Of Note: Patient was seen for a Clinical Swallow Eval 1/7/2024 (See Consult).    Patient arrived to Radiology for Cinesophagram. Patient was accompanied by his son and grandson. Patient speaks English and Icelandic. Patient with NGTube in place. Patient transferred to a specialized seating unit with lateral view projection.     Language Solutions Line for Icelandic ID# [id number] ([facility]) during the Cinesophagram. Pre and Post swallow study. Patient's son provided translation per patient's preference. 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 CTA demonstrating mesenteric fat stranding associated with ascending/transverse colon. S/p SMA angiography with stent placement with diagnostic laparoscopy 12/24, small bowel and visible colon viable, some inflammation of omentum in RUQ. Course c/b GI bleed, s/p scope on 1/2 with GI with clips placed. Patient transferred overnight from SICU to the floor in clinically stable condition.    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 CTA demonstrating mesenteric fat stranding associated with ascending/transverse colon. S/p SMA angiography with stent placement with diagnostic laparoscopy 12/24, small bowel and visible colon viable, some inflammation of omentum in RUQ. Course c/b GI bleed, s/p scope on 1/2 with GI with clips placed.     Of Note: Patient was seen for a Clinical Swallow Eval 1/7/2024 (See Consult).    Patient arrived to Radiology for Cinesophagram. Patient was accompanied by his son and grandson. Patient speaks English and Kazakh. Patient with NGTube in place. Patient transferred to a specialized seating unit with lateral view projection.     Language Solutions Line for Kazakh ID# [id number] ([facility]) during the Cinesophagram. Pre and Post swallow study. Patient's son provided translation per patient's preference. 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 CTA demonstrating mesenteric fat stranding associated with ascending/transverse colon. S/p SMA angiography with stent placement with diagnostic laparoscopy 12/24, small bowel and visible colon viable, some inflammation of omentum in RUQ. Course c/b GI bleed, s/p scope on 1/2 with GI with clips placed. Patient transferred overnight from SICU to the floor in clinically stable condition.    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 CTA demonstrating mesenteric fat stranding associated with ascending/transverse colon. S/p SMA angiography with stent placement with diagnostic laparoscopy 12/24, small bowel and visible colon viable, some inflammation of omentum in RUQ. Course c/b GI bleed, s/p scope on 1/2 with GI with clips placed.     Of Note: Patient was seen for a Clinical Swallow Eval 1/7/2024 (See Consult).    Patient arrived to Radiology for Cinesophagram. Patient was accompanied by his son and grandson. Patient speaks English and Sami. Patient with NGTube in place. Patient transferred to a specialized seating unit with lateral view projection.     Language Solutions Line for Sami ID# [id number] ([facility]) during the Cinesophagram. Pre and Post swallow study. Patient's son provided translation per patient's preference. Of Note: Patient reluctant and required encouragement for PO Trials. 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 CTA demonstrating mesenteric fat stranding associated with ascending/transverse colon. S/p SMA angiography with stent placement with diagnostic laparoscopy 12/24, small bowel and visible colon viable, some inflammation of omentum in RUQ. Course c/b GI bleed, s/p scope on 1/2 with GI with clips placed. Patient transferred overnight from SICU to the floor in clinically stable condition.    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 CTA demonstrating mesenteric fat stranding associated with ascending/transverse colon. S/p SMA angiography with stent placement with diagnostic laparoscopy 12/24, small bowel and visible colon viable, some inflammation of omentum in RUQ. Course c/b GI bleed, s/p scope on 1/2 with GI with clips placed.     Of Note: Patient was seen for a Clinical Swallow Eval 1/7/2024 (See Consult).    Patient arrived to Radiology for Cinesophagram. Patient was accompanied by his son and grandson. Patient speaks English and Hebrew. Patient with NGTube in place. Patient transferred to a specialized seating unit with lateral view projection.     Language Solutions Line for Hebrew ID# [id number] ([facility]) during the Cinesophagram. Pre and Post swallow study. Patient's son provided translation per patient's preference. Of Note: Patient reluctant and required encouragement for PO Trials.

## 2024-01-09 LAB
ALBUMIN SERPL ELPH-MCNC: 2.8 G/DL — LOW (ref 3.3–5)
ALBUMIN SERPL ELPH-MCNC: 2.8 G/DL — LOW (ref 3.3–5)
ALP SERPL-CCNC: 78 U/L — SIGNIFICANT CHANGE UP (ref 40–120)
ALP SERPL-CCNC: 78 U/L — SIGNIFICANT CHANGE UP (ref 40–120)
ALT FLD-CCNC: 18 U/L — SIGNIFICANT CHANGE UP (ref 4–41)
ALT FLD-CCNC: 18 U/L — SIGNIFICANT CHANGE UP (ref 4–41)
ANION GAP SERPL CALC-SCNC: 7 MMOL/L — SIGNIFICANT CHANGE UP (ref 7–14)
ANION GAP SERPL CALC-SCNC: 7 MMOL/L — SIGNIFICANT CHANGE UP (ref 7–14)
AST SERPL-CCNC: 16 U/L — SIGNIFICANT CHANGE UP (ref 4–40)
AST SERPL-CCNC: 16 U/L — SIGNIFICANT CHANGE UP (ref 4–40)
BILIRUB SERPL-MCNC: 0.4 MG/DL — SIGNIFICANT CHANGE UP (ref 0.2–1.2)
BILIRUB SERPL-MCNC: 0.4 MG/DL — SIGNIFICANT CHANGE UP (ref 0.2–1.2)
BLD GP AB SCN SERPL QL: NEGATIVE — SIGNIFICANT CHANGE UP
BLD GP AB SCN SERPL QL: NEGATIVE — SIGNIFICANT CHANGE UP
BUN SERPL-MCNC: 21 MG/DL — SIGNIFICANT CHANGE UP (ref 7–23)
BUN SERPL-MCNC: 21 MG/DL — SIGNIFICANT CHANGE UP (ref 7–23)
CALCIUM SERPL-MCNC: 7.9 MG/DL — LOW (ref 8.4–10.5)
CALCIUM SERPL-MCNC: 7.9 MG/DL — LOW (ref 8.4–10.5)
CHLORIDE SERPL-SCNC: 108 MMOL/L — HIGH (ref 98–107)
CHLORIDE SERPL-SCNC: 108 MMOL/L — HIGH (ref 98–107)
CO2 SERPL-SCNC: 25 MMOL/L — SIGNIFICANT CHANGE UP (ref 22–31)
CO2 SERPL-SCNC: 25 MMOL/L — SIGNIFICANT CHANGE UP (ref 22–31)
CREAT SERPL-MCNC: 1.33 MG/DL — HIGH (ref 0.5–1.3)
CREAT SERPL-MCNC: 1.33 MG/DL — HIGH (ref 0.5–1.3)
EGFR: 51 ML/MIN/1.73M2 — LOW
EGFR: 51 ML/MIN/1.73M2 — LOW
GLUCOSE BLDC GLUCOMTR-MCNC: 121 MG/DL — HIGH (ref 70–99)
GLUCOSE BLDC GLUCOMTR-MCNC: 121 MG/DL — HIGH (ref 70–99)
GLUCOSE BLDC GLUCOMTR-MCNC: 126 MG/DL — HIGH (ref 70–99)
GLUCOSE BLDC GLUCOMTR-MCNC: 126 MG/DL — HIGH (ref 70–99)
GLUCOSE BLDC GLUCOMTR-MCNC: 130 MG/DL — HIGH (ref 70–99)
GLUCOSE BLDC GLUCOMTR-MCNC: 130 MG/DL — HIGH (ref 70–99)
GLUCOSE BLDC GLUCOMTR-MCNC: 145 MG/DL — HIGH (ref 70–99)
GLUCOSE BLDC GLUCOMTR-MCNC: 145 MG/DL — HIGH (ref 70–99)
GLUCOSE BLDC GLUCOMTR-MCNC: 194 MG/DL — HIGH (ref 70–99)
GLUCOSE BLDC GLUCOMTR-MCNC: 194 MG/DL — HIGH (ref 70–99)
GLUCOSE BLDC GLUCOMTR-MCNC: 219 MG/DL — HIGH (ref 70–99)
GLUCOSE BLDC GLUCOMTR-MCNC: 219 MG/DL — HIGH (ref 70–99)
GLUCOSE SERPL-MCNC: 146 MG/DL — HIGH (ref 70–99)
GLUCOSE SERPL-MCNC: 146 MG/DL — HIGH (ref 70–99)
HCT VFR BLD CALC: 25.7 % — LOW (ref 39–50)
HCT VFR BLD CALC: 25.7 % — LOW (ref 39–50)
HCT VFR BLD CALC: 29.5 % — LOW (ref 39–50)
HCT VFR BLD CALC: 29.5 % — LOW (ref 39–50)
HGB BLD-MCNC: 8 G/DL — LOW (ref 13–17)
HGB BLD-MCNC: 8 G/DL — LOW (ref 13–17)
HGB BLD-MCNC: 9.1 G/DL — LOW (ref 13–17)
HGB BLD-MCNC: 9.1 G/DL — LOW (ref 13–17)
MAGNESIUM SERPL-MCNC: 2 MG/DL — SIGNIFICANT CHANGE UP (ref 1.6–2.6)
MAGNESIUM SERPL-MCNC: 2 MG/DL — SIGNIFICANT CHANGE UP (ref 1.6–2.6)
MCHC RBC-ENTMCNC: 29.6 PG — SIGNIFICANT CHANGE UP (ref 27–34)
MCHC RBC-ENTMCNC: 29.6 PG — SIGNIFICANT CHANGE UP (ref 27–34)
MCHC RBC-ENTMCNC: 30.5 PG — SIGNIFICANT CHANGE UP (ref 27–34)
MCHC RBC-ENTMCNC: 30.5 PG — SIGNIFICANT CHANGE UP (ref 27–34)
MCHC RBC-ENTMCNC: 30.8 GM/DL — LOW (ref 32–36)
MCHC RBC-ENTMCNC: 30.8 GM/DL — LOW (ref 32–36)
MCHC RBC-ENTMCNC: 31.1 GM/DL — LOW (ref 32–36)
MCHC RBC-ENTMCNC: 31.1 GM/DL — LOW (ref 32–36)
MCV RBC AUTO: 96.1 FL — SIGNIFICANT CHANGE UP (ref 80–100)
MCV RBC AUTO: 96.1 FL — SIGNIFICANT CHANGE UP (ref 80–100)
MCV RBC AUTO: 98.1 FL — SIGNIFICANT CHANGE UP (ref 80–100)
MCV RBC AUTO: 98.1 FL — SIGNIFICANT CHANGE UP (ref 80–100)
NRBC # BLD: 0 /100 WBCS — SIGNIFICANT CHANGE UP (ref 0–0)
NRBC # FLD: 0 K/UL — SIGNIFICANT CHANGE UP (ref 0–0)
PHOSPHATE SERPL-MCNC: 2.8 MG/DL — SIGNIFICANT CHANGE UP (ref 2.5–4.5)
PHOSPHATE SERPL-MCNC: 2.8 MG/DL — SIGNIFICANT CHANGE UP (ref 2.5–4.5)
PLATELET # BLD AUTO: 284 K/UL — SIGNIFICANT CHANGE UP (ref 150–400)
PLATELET # BLD AUTO: 284 K/UL — SIGNIFICANT CHANGE UP (ref 150–400)
PLATELET # BLD AUTO: 326 K/UL — SIGNIFICANT CHANGE UP (ref 150–400)
PLATELET # BLD AUTO: 326 K/UL — SIGNIFICANT CHANGE UP (ref 150–400)
POTASSIUM SERPL-MCNC: 5.3 MMOL/L — SIGNIFICANT CHANGE UP (ref 3.5–5.3)
POTASSIUM SERPL-MCNC: 5.3 MMOL/L — SIGNIFICANT CHANGE UP (ref 3.5–5.3)
POTASSIUM SERPL-SCNC: 5.3 MMOL/L — SIGNIFICANT CHANGE UP (ref 3.5–5.3)
POTASSIUM SERPL-SCNC: 5.3 MMOL/L — SIGNIFICANT CHANGE UP (ref 3.5–5.3)
PROT SERPL-MCNC: 6 G/DL — SIGNIFICANT CHANGE UP (ref 6–8.3)
PROT SERPL-MCNC: 6 G/DL — SIGNIFICANT CHANGE UP (ref 6–8.3)
RBC # BLD: 2.62 M/UL — LOW (ref 4.2–5.8)
RBC # BLD: 2.62 M/UL — LOW (ref 4.2–5.8)
RBC # BLD: 3.07 M/UL — LOW (ref 4.2–5.8)
RBC # BLD: 3.07 M/UL — LOW (ref 4.2–5.8)
RBC # FLD: 20.5 % — HIGH (ref 10.3–14.5)
RBC # FLD: 20.5 % — HIGH (ref 10.3–14.5)
RBC # FLD: 21.1 % — HIGH (ref 10.3–14.5)
RBC # FLD: 21.1 % — HIGH (ref 10.3–14.5)
RH IG SCN BLD-IMP: POSITIVE — SIGNIFICANT CHANGE UP
RH IG SCN BLD-IMP: POSITIVE — SIGNIFICANT CHANGE UP
SODIUM SERPL-SCNC: 140 MMOL/L — SIGNIFICANT CHANGE UP (ref 135–145)
SODIUM SERPL-SCNC: 140 MMOL/L — SIGNIFICANT CHANGE UP (ref 135–145)
WBC # BLD: 6.48 K/UL — SIGNIFICANT CHANGE UP (ref 3.8–10.5)
WBC # BLD: 6.48 K/UL — SIGNIFICANT CHANGE UP (ref 3.8–10.5)
WBC # BLD: 7.64 K/UL — SIGNIFICANT CHANGE UP (ref 3.8–10.5)
WBC # BLD: 7.64 K/UL — SIGNIFICANT CHANGE UP (ref 3.8–10.5)
WBC # FLD AUTO: 6.48 K/UL — SIGNIFICANT CHANGE UP (ref 3.8–10.5)
WBC # FLD AUTO: 6.48 K/UL — SIGNIFICANT CHANGE UP (ref 3.8–10.5)
WBC # FLD AUTO: 7.64 K/UL — SIGNIFICANT CHANGE UP (ref 3.8–10.5)
WBC # FLD AUTO: 7.64 K/UL — SIGNIFICANT CHANGE UP (ref 3.8–10.5)

## 2024-01-09 PROCEDURE — 71045 X-RAY EXAM CHEST 1 VIEW: CPT | Mod: 26

## 2024-01-09 RX ORDER — IPRATROPIUM/ALBUTEROL SULFATE 18-103MCG
3 AEROSOL WITH ADAPTER (GRAM) INHALATION EVERY 6 HOURS
Refills: 0 | Status: DISCONTINUED | OUTPATIENT
Start: 2024-01-09 | End: 2024-01-17

## 2024-01-09 RX ORDER — ACETYLCYSTEINE 200 MG/ML
4 VIAL (ML) MISCELLANEOUS EVERY 6 HOURS
Refills: 0 | Status: DISCONTINUED | OUTPATIENT
Start: 2024-01-09 | End: 2024-01-15

## 2024-01-09 RX ORDER — INSULIN LISPRO 100/ML
VIAL (ML) SUBCUTANEOUS
Refills: 0 | Status: DISCONTINUED | OUTPATIENT
Start: 2024-01-09 | End: 2024-01-19

## 2024-01-09 RX ORDER — INSULIN LISPRO 100/ML
VIAL (ML) SUBCUTANEOUS AT BEDTIME
Refills: 0 | Status: DISCONTINUED | OUTPATIENT
Start: 2024-01-09 | End: 2024-01-19

## 2024-01-09 RX ORDER — FUROSEMIDE 40 MG
40 TABLET ORAL ONCE
Refills: 0 | Status: COMPLETED | OUTPATIENT
Start: 2024-01-09 | End: 2024-01-09

## 2024-01-09 RX ORDER — FUROSEMIDE 40 MG
40 TABLET ORAL DAILY
Refills: 0 | Status: COMPLETED | OUTPATIENT
Start: 2024-01-10 | End: 2024-01-12

## 2024-01-09 RX ADMIN — INSULIN GLARGINE 14 UNIT(S): 100 INJECTION, SOLUTION SUBCUTANEOUS at 00:43

## 2024-01-09 RX ADMIN — Medication 1 GRAM(S): at 18:49

## 2024-01-09 RX ADMIN — AMLODIPINE BESYLATE 5 MILLIGRAM(S): 2.5 TABLET ORAL at 06:09

## 2024-01-09 RX ADMIN — INSULIN GLARGINE 14 UNIT(S): 100 INJECTION, SOLUTION SUBCUTANEOUS at 23:24

## 2024-01-09 RX ADMIN — Medication 1 GRAM(S): at 06:11

## 2024-01-09 RX ADMIN — MOMETASONE FUROATE 2 PUFF(S): 220 INHALANT RESPIRATORY (INHALATION) at 23:38

## 2024-01-09 RX ADMIN — Medication 81 MILLIGRAM(S): at 13:43

## 2024-01-09 RX ADMIN — HEPARIN SODIUM 5000 UNIT(S): 5000 INJECTION INTRAVENOUS; SUBCUTANEOUS at 06:09

## 2024-01-09 RX ADMIN — LEVETIRACETAM 250 MILLIGRAM(S): 250 TABLET, FILM COATED ORAL at 18:48

## 2024-01-09 RX ADMIN — LEVETIRACETAM 250 MILLIGRAM(S): 250 TABLET, FILM COATED ORAL at 06:09

## 2024-01-09 RX ADMIN — RANOLAZINE 500 MILLIGRAM(S): 500 TABLET, FILM COATED, EXTENDED RELEASE ORAL at 06:10

## 2024-01-09 RX ADMIN — Medication 4 MILLILITER(S): at 19:53

## 2024-01-09 RX ADMIN — Medication 5 MILLIGRAM(S): at 13:44

## 2024-01-09 RX ADMIN — Medication 3 MILLILITER(S): at 08:48

## 2024-01-09 RX ADMIN — MOMETASONE FUROATE 2 PUFF(S): 220 INHALANT RESPIRATORY (INHALATION) at 13:42

## 2024-01-09 RX ADMIN — TICAGRELOR 60 MILLIGRAM(S): 90 TABLET ORAL at 18:49

## 2024-01-09 RX ADMIN — Medication 5 MILLIGRAM(S): at 18:49

## 2024-01-09 RX ADMIN — Medication 40 MILLIGRAM(S): at 13:36

## 2024-01-09 RX ADMIN — DORNASE ALFA 2.5 MILLIGRAM(S): 1 SOLUTION RESPIRATORY (INHALATION) at 19:51

## 2024-01-09 RX ADMIN — Medication 5 MILLIGRAM(S): at 06:10

## 2024-01-09 RX ADMIN — GABAPENTIN 100 MILLIGRAM(S): 400 CAPSULE ORAL at 18:50

## 2024-01-09 RX ADMIN — GABAPENTIN 100 MILLIGRAM(S): 400 CAPSULE ORAL at 06:09

## 2024-01-09 RX ADMIN — PANTOPRAZOLE SODIUM 40 MILLIGRAM(S): 20 TABLET, DELAYED RELEASE ORAL at 06:10

## 2024-01-09 RX ADMIN — Medication 3 MILLILITER(S): at 19:50

## 2024-01-09 RX ADMIN — RANOLAZINE 500 MILLIGRAM(S): 500 TABLET, FILM COATED, EXTENDED RELEASE ORAL at 18:50

## 2024-01-09 RX ADMIN — MEMANTINE HYDROCHLORIDE 5 MILLIGRAM(S): 10 TABLET ORAL at 18:49

## 2024-01-09 RX ADMIN — DORNASE ALFA 2.5 MILLIGRAM(S): 1 SOLUTION RESPIRATORY (INHALATION) at 08:48

## 2024-01-09 RX ADMIN — Medication 1200 MILLIGRAM(S): at 18:50

## 2024-01-09 RX ADMIN — ESCITALOPRAM OXALATE 10 MILLIGRAM(S): 10 TABLET, FILM COATED ORAL at 13:45

## 2024-01-09 RX ADMIN — HEPARIN SODIUM 5000 UNIT(S): 5000 INJECTION INTRAVENOUS; SUBCUTANEOUS at 13:54

## 2024-01-09 RX ADMIN — HEPARIN SODIUM 5000 UNIT(S): 5000 INJECTION INTRAVENOUS; SUBCUTANEOUS at 23:26

## 2024-01-09 RX ADMIN — PANTOPRAZOLE SODIUM 40 MILLIGRAM(S): 20 TABLET, DELAYED RELEASE ORAL at 18:49

## 2024-01-09 RX ADMIN — MEMANTINE HYDROCHLORIDE 5 MILLIGRAM(S): 10 TABLET ORAL at 06:10

## 2024-01-09 NOTE — PROGRESS NOTE ADULT - SUBJECTIVE AND OBJECTIVE BOX
Date of Service  : 01-09-24     INTERVAL HPI/OVERNIGHT EVENTS: Coughing up lot of mucus   Vital Signs Last 24 Hrs  T(C): 36.3 (09 Jan 2024 09:58), Max: 36.9 (09 Jan 2024 06:00)  T(F): 97.4 (09 Jan 2024 09:58), Max: 98.5 (09 Jan 2024 06:00)  HR: 84 (09 Jan 2024 09:58) (78 - 88)  BP: 134/66 (09 Jan 2024 09:58) (120/55 - 167/69)  BP(mean): --  RR: 18 (09 Jan 2024 09:58) (17 - 18)  SpO2: 99% (09 Jan 2024 09:58) (94% - 100%)    Parameters below as of 09 Jan 2024 09:58  Patient On (Oxygen Delivery Method): nasal cannula      I&O's Summary    08 Jan 2024 07:01  -  09 Jan 2024 07:00  --------------------------------------------------------  IN: 400 mL / OUT: 400 mL / NET: 0 mL      MEDICATIONS  (STANDING):  albuterol/ipratropium for Nebulization 3 milliLiter(s) Nebulizer every 12 hours  amLODIPine   Tablet 5 milliGRAM(s) Oral daily  aspirin  chewable 81 milliGRAM(s) Oral daily  dornase cierra Solution 2.5 milliGRAM(s) Inhalation every 12 hours  escitalopram 10 milliGRAM(s) Oral daily  gabapentin Solution 100 milliGRAM(s) Oral two times a day  heparin   Injectable 5000 Unit(s) SubCutaneous every 8 hours  insulin glargine Injectable (LANTUS) 14 Unit(s) SubCutaneous at bedtime  insulin lispro (ADMELOG) corrective regimen sliding scale   SubCutaneous every 6 hours  levETIRAcetam   Injectable 250 milliGRAM(s) IV Push every 12 hours  melatonin 3 milliGRAM(s) Oral at bedtime  memantine 5 milliGRAM(s) Oral two times a day  metoprolol tartrate Injectable 5 milliGRAM(s) IV Push every 6 hours  mometasone 110 MICROgram(s) Inhaler 2 Puff(s) Inhalation every 12 hours  pantoprazole  Injectable 40 milliGRAM(s) IV Push every 12 hours  ranolazine 500 milliGRAM(s) Oral two times a day  sucralfate 1 Gram(s) Oral every 12 hours  ticagrelor 60 milliGRAM(s) Enteral Tube every 12 hours    MEDICATIONS  (PRN):  acetaminophen   IVPB .. 1000 milliGRAM(s) IV Intermittent every 6 hours PRN Mild Pain (1 - 3), Moderate Pain (4 - 6)    LABS:                        8.0    6.48  )-----------( 284      ( 09 Jan 2024 08:45 )             25.7     01-09    140  |  108<H>  |  21  ----------------------------<  146<H>  5.3   |  25  |  1.33<H>    Ca    7.9<L>      09 Jan 2024 08:45  Phos  2.8     01-09  Mg     2.00     01-09    TPro  6.0  /  Alb  2.8<L>  /  TBili  0.4  /  DBili  x   /  AST  16  /  ALT  18  /  AlkPhos  78  01-09      Urinalysis Basic - ( 09 Jan 2024 08:45 )    Color: x / Appearance: x / SG: x / pH: x  Gluc: 146 mg/dL / Ketone: x  / Bili: x / Urobili: x   Blood: x / Protein: x / Nitrite: x   Leuk Esterase: x / RBC: x / WBC x   Sq Epi: x / Non Sq Epi: x / Bacteria: x      CAPILLARY BLOOD GLUCOSE      POCT Blood Glucose.: 126 mg/dL (09 Jan 2024 08:53)  POCT Blood Glucose.: 145 mg/dL (09 Jan 2024 00:41)  POCT Blood Glucose.: 188 mg/dL (08 Jan 2024 21:13)  POCT Blood Glucose.: 199 mg/dL (08 Jan 2024 18:04)  POCT Blood Glucose.: 273 mg/dL (08 Jan 2024 12:43)        Urinalysis Basic - ( 09 Jan 2024 08:45 )    Color: x / Appearance: x / SG: x / pH: x  Gluc: 146 mg/dL / Ketone: x  / Bili: x / Urobili: x   Blood: x / Protein: x / Nitrite: x   Leuk Esterase: x / RBC: x / WBC x   Sq Epi: x / Non Sq Epi: x / Bacteria: x      REVIEW OF SYSTEMS:  CONSTITUTIONAL: No fever, weight loss, or fatigue  EYES: No eye pain, visual disturbances, or discharge  ENMT:  No difficulty hearing, tinnitus, vertigo; No sinus or throat pain  NECK: No pain or stiffness  RESPIRATORY: No  wheezing, chills or hemoptysis; No shortness of breath  CARDIOVASCULAR: No chest pain, palpitations, dizziness, or leg swelling  GASTROINTESTINAL: No abdominal or epigastric pain. No nausea, vomiting, or hematemesis; No diarrhea or constipation. No melena or hematochezia.  GENITOURINARY: No dysuria, frequency, hematuria, or incontinence      RADIOLOGY & ADDITIONAL TESTS:    Consultant(s) Notes Reviewed:  [x ] YES  [ ] NO    PHYSICAL EXAM:  GENERAL: Not in any distress , NGT +  HEAD:  Atraumatic, Normocephalic  EYES: EOMI, PERRLA, conjunctiva and sclera clear  ENMT: No tonsillar erythema, exudates, or enlargement; Moist mucous membranes, Good dentition, No lesions  NECK: Supple, No JVD, Normal thyroid  NERVOUS SYSTEM:  Alert & Oriented X3, No focal deficit   CHEST/LUNG: Good air entry bilateral with no  rales, rhonchi, wheezing, or rubs  HEART: Regular rate and rhythm; No murmurs, rubs, or gallops  ABDOMEN: Soft, Nontender, Nondistended; Bowel sounds present  EXTREMITIES:   No clubbing, cyanosis, or edema      Care Discussed with Consultants/Other Providers [ x] YES  [ ] NO

## 2024-01-09 NOTE — PROGRESS NOTE ADULT - SUBJECTIVE AND OBJECTIVE BOX
Aashish Boyer MD  Interventional Cardiology / Advance Heart Failure and Cardiac Transplant Specialist  [place] Office : [address] [place] [address]  Tel:   [place] Office : [address] [facility] [address]  Tel: [phone]      Subjective/Overnight events: Pt is lying in bed not in distress,  	  MEDICATIONS:  amLODIPine   Tablet 5 milliGRAM(s) Oral daily  aspirin  chewable 81 milliGRAM(s) Oral daily  heparin   Injectable 5000 Unit(s) SubCutaneous every 8 hours  metoprolol tartrate Injectable 5 milliGRAM(s) IV Push every 6 hours  ranolazine 500 milliGRAM(s) Oral two times a day  ticagrelor 60 milliGRAM(s) Enteral Tube every 12 hours      albuterol/ipratropium for Nebulization 3 milliLiter(s) Nebulizer every 12 hours  dornase cierra Solution 2.5 milliGRAM(s) Inhalation every 12 hours  mometasone 110 MICROgram(s) Inhaler 2 Puff(s) Inhalation every 12 hours    acetaminophen   IVPB .. 1000 milliGRAM(s) IV Intermittent every 6 hours PRN  escitalopram 10 milliGRAM(s) Oral daily  gabapentin Solution 100 milliGRAM(s) Oral two times a day  levETIRAcetam   Injectable 250 milliGRAM(s) IV Push every 12 hours  melatonin 3 milliGRAM(s) Oral at bedtime  memantine 5 milliGRAM(s) Oral two times a day    pantoprazole  Injectable 40 milliGRAM(s) IV Push every 12 hours  sucralfate 1 Gram(s) Oral every 12 hours    insulin glargine Injectable (LANTUS) 14 Unit(s) SubCutaneous at bedtime  insulin lispro (ADMELOG) corrective regimen sliding scale   SubCutaneous every 6 hours        PAST MEDICAL/SURGICAL HISTORY  PAST MEDICAL & SURGICAL HISTORY:  Hyperlipemia      Hypertension      Coronary Artery Disease      Diabetes Mellitus Type II      Stented Coronary Artery  total 5 stents, last stent 5/2019      Neuropathy      Myocardial infarction      Stroke  mild left facial numbness   no other residuals verbalized      Myoclonic jerking      Stage 3 chronic kidney disease      History of Cataract Extraction      Hx of CABG      H/O coronary angiogram      S/P coronary artery stent placement  1/6/09      S/P placement of cardiac pacemaker          SOCIAL HISTORY: Substance Use (street drugs): ( x ) never used  (  ) other:    FAMILY HISTORY:  No pertinent family history in first degree relatives          PHYSICAL EXAM:  T(C): 36.3 (01-09-24 @ 09:58), Max: 36.9 (01-09-24 @ 06:00)  HR: 84 (01-09-24 @ 09:58) (78 - 88)  BP: 134/66 (01-09-24 @ 09:58) (120/55 - 167/69)  RR: 18 (01-09-24 @ 09:58) (17 - 18)  SpO2: 99% (01-09-24 @ 09:58) (94% - 100%)  Wt(kg): --  I&O's Summary    08 Jan 2024 07:01  -  09 Jan 2024 07:00  --------------------------------------------------------  IN: 400 mL / OUT: 400 mL / NET: 0 mL          GENERAL: NAD  EYES:  conjunctiva and sclera clear  ENMT: No tonsillar erythema, exudates, or enlargement  Cardiovascular: Normal S1 S2, No JVD, No murmurs, No edema  Respiratory: Lungs rhonchi to auscultation	  Gastrointestinal:  Soft  Extremities: anasarca                                    8.0    6.48  )-----------( 284      ( 09 Jan 2024 08:45 )             25.7     01-09    140  |  108<H>  |  21  ----------------------------<  146<H>  5.3   |  25  |  1.33<H>    Ca    7.9<L>      09 Jan 2024 08:45  Phos  2.8     01-09  Mg     2.00     01-09    TPro  6.0  /  Alb  2.8<L>  /  TBili  0.4  /  DBili  x   /  AST  16  /  ALT  18  /  AlkPhos  78  01-09    proBNP:   Lipid Profile:   HgA1c:   TSH:     Consultant(s) Notes Reviewed:  [x ] YES  [ ] NO    Care Discussed with Consultants/Other Providers [ x] YES  [ ] NO    Imaging Personally Reviewed independently:  [x] YES  [ ] NO    All labs, radiologic studies, vitals, orders and medications list reviewed. Patient is seen and examined at bedside. Case discussed with medical team.         Aashish Boyer MD  Interventional Cardiology / Advance Heart Failure and Cardiac Transplant Specialist  [place] Office : [address] [place] [address]  Tel:   [place] Office : [address] [facility] [address]  Tel: [phone]      Subjective/Overnight events: Pt is lying in bed not in distress,  	  MEDICATIONS:  amLODIPine   Tablet 5 milliGRAM(s) Oral daily  aspirin  chewable 81 milliGRAM(s) Oral daily  heparin   Injectable 5000 Unit(s) SubCutaneous every 8 hours  metoprolol tartrate Injectable 5 milliGRAM(s) IV Push every 6 hours  ranolazine 500 milliGRAM(s) Oral two times a day  ticagrelor 60 milliGRAM(s) Enteral Tube every 12 hours      albuterol/ipratropium for Nebulization 3 milliLiter(s) Nebulizer every 12 hours  dornase cierra Solution 2.5 milliGRAM(s) Inhalation every 12 hours  mometasone 110 MICROgram(s) Inhaler 2 Puff(s) Inhalation every 12 hours    acetaminophen   IVPB .. 1000 milliGRAM(s) IV Intermittent every 6 hours PRN  escitalopram 10 milliGRAM(s) Oral daily  gabapentin Solution 100 milliGRAM(s) Oral two times a day  levETIRAcetam   Injectable 250 milliGRAM(s) IV Push every 12 hours  melatonin 3 milliGRAM(s) Oral at bedtime  memantine 5 milliGRAM(s) Oral two times a day    pantoprazole  Injectable 40 milliGRAM(s) IV Push every 12 hours  sucralfate 1 Gram(s) Oral every 12 hours    insulin glargine Injectable (LANTUS) 14 Unit(s) SubCutaneous at bedtime  insulin lispro (ADMELOG) corrective regimen sliding scale   SubCutaneous every 6 hours        PAST MEDICAL/SURGICAL HISTORY  PAST MEDICAL & SURGICAL HISTORY:  Hyperlipemia      Hypertension      Coronary Artery Disease      Diabetes Mellitus Type II      Stented Coronary Artery  total 5 stents, last stent 5/2019      Neuropathy      Myocardial infarction      Stroke  mild left facial numbness   no other residuals verbalized      Myoclonic jerking      Stage 3 chronic kidney disease      History of Cataract Extraction      Hx of CABG      H/O coronary angiogram      S/P coronary artery stent placement  1/6/09      S/P placement of cardiac pacemaker          SOCIAL HISTORY: Substance Use (street drugs): ( x ) never used  (  ) other:    FAMILY HISTORY:  No pertinent family history in first degree relatives          PHYSICAL EXAM:  T(C): 36.3 (01-09-24 @ 09:58), Max: 36.9 (01-09-24 @ 06:00)  HR: 84 (01-09-24 @ 09:58) (78 - 88)  BP: 134/66 (01-09-24 @ 09:58) (120/55 - 167/69)  RR: 18 (01-09-24 @ 09:58) (17 - 18)  SpO2: 99% (01-09-24 @ 09:58) (94% - 100%)  Wt(kg): --  I&O's Summary    08 Jan 2024 07:01  -  09 Jan 2024 07:00  --------------------------------------------------------  IN: 400 mL / OUT: 400 mL / NET: 0 mL          GENERAL: NAD  EYES:  conjunctiva and sclera clear  ENMT: No tonsillar erythema, exudates, or enlargement  Cardiovascular: Normal S1 S2, No JVD, No murmurs, No edema  Respiratory: Lungs rhonchi to auscultation	  Gastrointestinal:  Soft  Extremities: anasarca                                    8.0    6.48  )-----------( 284      ( 09 Jan 2024 08:45 )             25.7     01-09    140  |  108<H>  |  21  ----------------------------<  146<H>  5.3   |  25  |  1.33<H>    Ca    7.9<L>      09 Jan 2024 08:45  Phos  2.8     01-09  Mg     2.00     01-09    TPro  6.0  /  Alb  2.8<L>  /  TBili  0.4  /  DBili  x   /  AST  16  /  ALT  18  /  AlkPhos  78  01-09    proBNP:   Lipid Profile:   HgA1c:   TSH:     Consultant(s) Notes Reviewed:  [x ] YES  [ ] NO    Care Discussed with Consultants/Other Providers [ x] YES  [ ] NO    Imaging Personally Reviewed independently:  [x] YES  [ ] NO    All labs, radiologic studies, vitals, orders and medications list reviewed. Patient is seen and examined at bedside. Case discussed with medical team.

## 2024-01-09 NOTE — PROGRESS NOTE ADULT - ASSESSMENT
still with cough  NAD.     acetaminophen   IVPB .. 1000 milliGRAM(s) IV Intermittent every 6 hours PRN  albuterol/ipratropium for Nebulization 3 milliLiter(s) Nebulizer every 12 hours  amLODIPine   Tablet 5 milliGRAM(s) Oral daily  aspirin  chewable 81 milliGRAM(s) Oral daily  dornase cierra Solution 2.5 milliGRAM(s) Inhalation every 12 hours  escitalopram 10 milliGRAM(s) Oral daily  gabapentin Solution 100 milliGRAM(s) Oral two times a day  heparin   Injectable 5000 Unit(s) SubCutaneous every 8 hours  insulin glargine Injectable (LANTUS) 14 Unit(s) SubCutaneous at bedtime  insulin lispro (ADMELOG) corrective regimen sliding scale   SubCutaneous every 6 hours  levETIRAcetam   Injectable 250 milliGRAM(s) IV Push every 12 hours  melatonin 3 milliGRAM(s) Oral at bedtime  memantine 5 milliGRAM(s) Oral two times a day  metoprolol tartrate Injectable 5 milliGRAM(s) IV Push every 6 hours  mometasone 110 MICROgram(s) Inhaler 2 Puff(s) Inhalation every 12 hours  pantoprazole  Injectable 40 milliGRAM(s) IV Push every 12 hours  ranolazine 500 milliGRAM(s) Oral two times a day  sodium chloride 0.45% with potassium chloride 20 mEq/L 1000 milliLiter(s) IV Continuous <Continuous>  sodium phosphate 30 milliMole(s)/500 mL IVPB 30 milliMole(s) IV Intermittent once  sucralfate 1 Gram(s) Oral every 12 hours  ticagrelor 60 milliGRAM(s) Enteral Tube every 12 hours      VITAL:  T(C): , Max: 37 (01-06-24 @ 21:06)  T(F): , Max: 98.6 (01-06-24 @ 21:06)  HR: 86 (01-07-24 @ 05:45)  BP: 180/82 (01-07-24 @ 05:45)  BP(mean): --  RR: 19 (01-07-24 @ 05:45)  SpO2: 95% (01-07-24 @ 05:45)  Wt(kg): --    01-06-24 @ 07:01  -  01-07-24 @ 07:00  --------------------------------------------------------  IN: 185 mL / OUT: 0 mL / NET: 185 mL        PHYSICAL EXAM:  General: NAD; Alert  HEENT:  NCAT; PERRLA, +NGT  Neck: No JVD; supple  Respiratory:   Cardiac: RRR s1s2  Gastrointestinal: BS+, soft, NT/ND  Urologic: No delong  Extremities: No peripheral edema  Access:     LABS:                          9.2    10.00 )-----------( 312      ( 07 Jan 2024 06:00 )             28.6     Na(137)/K(5.2)/Cl(109)/HCO3(19)/BUN(21)/Cr(1.35)Glu(301)/Ca(7.8)/Mg(2.20)/PO4(2.2)    01-07 @ 06:00  Na(143)/K(5.0)/Cl(111)/HCO3(20)/BUN(20)/Cr(1.49)Glu(195)/Ca(8.2)/Mg(1.90)/PO4(2.7)    01-06 @ 07:35  Na(142)/K(5.1)/Cl(111)/HCO3(19)/BUN(22)/Cr(1.45)Glu(219)/Ca(8.0)/Mg(1.90)/PO4(3.2)    01-05 @ 22:19  Na(140)/K(5.0)/Cl(111)/HCO3(16)/BUN(24)/Cr(1.41)Glu(199)/Ca(8.1)/Mg(1.90)/PO4(3.3)    01-05 @ 01:00    Urinalysis Basic - ( 07 Jan 2024 06:00 )    Color: x / Appearance: x / SG: x / pH: x  Gluc: 301 mg/dL / Ketone: x  / Bili: x / Urobili: x   Blood: x / Protein: x / Nitrite: x   Leuk Esterase: x / RBC: x / WBC x   Sq Epi: x / Non Sq Epi: x / Bacteria: x            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 no labs for today   S/p CT with contrast   2 Lytes- controlled .phos replated yesterday   3 Hypocalcemia corrected ~8.8mg/dl acceptable   4 CV - BP elevated, on Lopressor 5 mg IV q6h, and amlodipine 5 mg daily ; consider clonidine patch 0.1 if remained >140 mmhg systolic   5 Vasc - s/p SMA stent placement;   6 Sx - s/p HIDA + for acute asa, medical management, remains on tube feeding.  7 GI - s/p EGD clipping of bleeding duodenal ulcers   8 Anemia- hgb improving stable           [facility]  Office: [phone]     still with cough  NAD.     acetaminophen   IVPB .. 1000 milliGRAM(s) IV Intermittent every 6 hours PRN  albuterol/ipratropium for Nebulization 3 milliLiter(s) Nebulizer every 12 hours  amLODIPine   Tablet 5 milliGRAM(s) Oral daily  aspirin  chewable 81 milliGRAM(s) Oral daily  dornase cierra Solution 2.5 milliGRAM(s) Inhalation every 12 hours  escitalopram 10 milliGRAM(s) Oral daily  gabapentin Solution 100 milliGRAM(s) Oral two times a day  heparin   Injectable 5000 Unit(s) SubCutaneous every 8 hours  insulin glargine Injectable (LANTUS) 14 Unit(s) SubCutaneous at bedtime  insulin lispro (ADMELOG) corrective regimen sliding scale   SubCutaneous every 6 hours  levETIRAcetam   Injectable 250 milliGRAM(s) IV Push every 12 hours  melatonin 3 milliGRAM(s) Oral at bedtime  memantine 5 milliGRAM(s) Oral two times a day  metoprolol tartrate Injectable 5 milliGRAM(s) IV Push every 6 hours  mometasone 110 MICROgram(s) Inhaler 2 Puff(s) Inhalation every 12 hours  pantoprazole  Injectable 40 milliGRAM(s) IV Push every 12 hours  ranolazine 500 milliGRAM(s) Oral two times a day  sodium chloride 0.45% with potassium chloride 20 mEq/L 1000 milliLiter(s) IV Continuous <Continuous>  sodium phosphate 30 milliMole(s)/500 mL IVPB 30 milliMole(s) IV Intermittent once  sucralfate 1 Gram(s) Oral every 12 hours  ticagrelor 60 milliGRAM(s) Enteral Tube every 12 hours      VITAL:  T(C): , Max: 37 (01-06-24 @ 21:06)  T(F): , Max: 98.6 (01-06-24 @ 21:06)  HR: 86 (01-07-24 @ 05:45)  BP: 180/82 (01-07-24 @ 05:45)  BP(mean): --  RR: 19 (01-07-24 @ 05:45)  SpO2: 95% (01-07-24 @ 05:45)  Wt(kg): --    01-06-24 @ 07:01  -  01-07-24 @ 07:00  --------------------------------------------------------  IN: 185 mL / OUT: 0 mL / NET: 185 mL        PHYSICAL EXAM:  General: NAD; Alert  HEENT:  NCAT; PERRLA, +NGT  Neck: No JVD; supple  Respiratory:   Cardiac: RRR s1s2  Gastrointestinal: BS+, soft, NT/ND  Urologic: No delong  Extremities: No peripheral edema  Access:     LABS:                          9.2    10.00 )-----------( 312      ( 07 Jan 2024 06:00 )             28.6     Na(137)/K(5.2)/Cl(109)/HCO3(19)/BUN(21)/Cr(1.35)Glu(301)/Ca(7.8)/Mg(2.20)/PO4(2.2)    01-07 @ 06:00  Na(143)/K(5.0)/Cl(111)/HCO3(20)/BUN(20)/Cr(1.49)Glu(195)/Ca(8.2)/Mg(1.90)/PO4(2.7)    01-06 @ 07:35  Na(142)/K(5.1)/Cl(111)/HCO3(19)/BUN(22)/Cr(1.45)Glu(219)/Ca(8.0)/Mg(1.90)/PO4(3.2)    01-05 @ 22:19  Na(140)/K(5.0)/Cl(111)/HCO3(16)/BUN(24)/Cr(1.41)Glu(199)/Ca(8.1)/Mg(1.90)/PO4(3.3)    01-05 @ 01:00    Urinalysis Basic - ( 07 Jan 2024 06:00 )    Color: x / Appearance: x / SG: x / pH: x  Gluc: 301 mg/dL / Ketone: x  / Bili: x / Urobili: x   Blood: x / Protein: x / Nitrite: x   Leuk Esterase: x / RBC: x / WBC x   Sq Epi: x / Non Sq Epi: x / Bacteria: x            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 no labs for today   S/p CT with contrast   2 Lytes- controlled .phos replated yesterday   3 Hypocalcemia corrected ~8.8mg/dl acceptable   4 CV - BP elevated, on Lopressor 5 mg IV q6h, and amlodipine 5 mg daily ; consider clonidine patch 0.1 if remained >140 mmhg systolic   5 Vasc - s/p SMA stent placement;   6 Sx - s/p HIDA + for acute asa, medical management, remains on tube feeding.  7 GI - s/p EGD clipping of bleeding duodenal ulcers   8 Anemia- hgb improving stable           [facility]  Office: [phone]     on distress   on puree  diet     acetaminophen   IVPB .. 1000 milliGRAM(s) IV Intermittent every 6 hours PRN  albuterol/ipratropium for Nebulization 3 milliLiter(s) Nebulizer every 12 hours  amLODIPine   Tablet 5 milliGRAM(s) Oral daily  aspirin  chewable 81 milliGRAM(s) Oral daily  dornase cierra Solution 2.5 milliGRAM(s) Inhalation every 12 hours  escitalopram 10 milliGRAM(s) Oral daily  gabapentin Solution 100 milliGRAM(s) Oral two times a day  heparin   Injectable 5000 Unit(s) SubCutaneous every 8 hours  insulin glargine Injectable (LANTUS) 14 Unit(s) SubCutaneous at bedtime  insulin lispro (ADMELOG) corrective regimen sliding scale   SubCutaneous every 6 hours  levETIRAcetam   Injectable 250 milliGRAM(s) IV Push every 12 hours  melatonin 3 milliGRAM(s) Oral at bedtime  memantine 5 milliGRAM(s) Oral two times a day  metoprolol tartrate Injectable 5 milliGRAM(s) IV Push every 6 hours  mometasone 110 MICROgram(s) Inhaler 2 Puff(s) Inhalation every 12 hours  pantoprazole  Injectable 40 milliGRAM(s) IV Push every 12 hours  ranolazine 500 milliGRAM(s) Oral two times a day  sodium chloride 0.45% with potassium chloride 20 mEq/L 1000 milliLiter(s) IV Continuous <Continuous>  sodium phosphate 30 milliMole(s)/500 mL IVPB 30 milliMole(s) IV Intermittent once  sucralfate 1 Gram(s) Oral every 12 hours  ticagrelor 60 milliGRAM(s) Enteral Tube every 12 hours      VITAL:  T(C): , Max: 37 (01-06-24 @ 21:06)  T(F): , Max: 98.6 (01-06-24 @ 21:06)  HR: 86 (01-07-24 @ 05:45)  BP: 180/82 (01-07-24 @ 05:45)  BP(mean): --  RR: 19 (01-07-24 @ 05:45)  SpO2: 95% (01-07-24 @ 05:45)  Wt(kg): --    01-06-24 @ 07:01  -  01-07-24 @ 07:00  --------------------------------------------------------  IN: 185 mL / OUT: 0 mL / NET: 185 mL        PHYSICAL EXAM:  General: NAD; Alert  HEENT:  NCAT; PERRLA, +NGT  Neck: No JVD; supple  Respiratory: b/l scattered rales   Cardiac: RRR s1s2  Gastrointestinal: BS+, soft, NT/ND  Urologic: No delong  Extremities: No peripheral edema  Access:     LABS:                          9.2    10.00 )-----------( 312      ( 07 Jan 2024 06:00 )             28.6     Na(137)/K(5.2)/Cl(109)/HCO3(19)/BUN(21)/Cr(1.35)Glu(301)/Ca(7.8)/Mg(2.20)/PO4(2.2)    01-07 @ 06:00  Na(143)/K(5.0)/Cl(111)/HCO3(20)/BUN(20)/Cr(1.49)Glu(195)/Ca(8.2)/Mg(1.90)/PO4(2.7)    01-06 @ 07:35  Na(142)/K(5.1)/Cl(111)/HCO3(19)/BUN(22)/Cr(1.45)Glu(219)/Ca(8.0)/Mg(1.90)/PO4(3.2)    01-05 @ 22:19  Na(140)/K(5.0)/Cl(111)/HCO3(16)/BUN(24)/Cr(1.41)Glu(199)/Ca(8.1)/Mg(1.90)/PO4(3.3)    01-05 @ 01:00    Urinalysis Basic - ( 07 Jan 2024 06:00 )    Color: x / Appearance: x / SG: x / pH: x  Gluc: 301 mg/dL / Ketone: x  / Bili: x / Urobili: x   Blood: x / Protein: x / Nitrite: x   Leuk Esterase: x / RBC: x / WBC x   Sq Epi: x / Non Sq Epi: x / Bacteria: x            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 no labs for today   S/p CT with contrast   2 Lytes- controlled .phos replated yesterday   3 Hypocalcemia corrected ~8.8mg/dl acceptable   4 CV - BP elevated, on Lopressor 5 mg IV q6h, and amlodipine 5 mg daily ; consider clonidine patch 0.1 if remained >140 mmhg systolic   5 Vasc - s/p SMA stent placement;   6 Sx - s/p HIDA + for acute asa, medical management, remains on tube feeding. puree diet   7 GI - s/p EGD clipping of bleeding duodenal ulcers   8 Anemia- hgb improving stable           [facility]  Office: [phone]     on distress   on puree  diet     acetaminophen   IVPB .. 1000 milliGRAM(s) IV Intermittent every 6 hours PRN  albuterol/ipratropium for Nebulization 3 milliLiter(s) Nebulizer every 12 hours  amLODIPine   Tablet 5 milliGRAM(s) Oral daily  aspirin  chewable 81 milliGRAM(s) Oral daily  dornase cierra Solution 2.5 milliGRAM(s) Inhalation every 12 hours  escitalopram 10 milliGRAM(s) Oral daily  gabapentin Solution 100 milliGRAM(s) Oral two times a day  heparin   Injectable 5000 Unit(s) SubCutaneous every 8 hours  insulin glargine Injectable (LANTUS) 14 Unit(s) SubCutaneous at bedtime  insulin lispro (ADMELOG) corrective regimen sliding scale   SubCutaneous every 6 hours  levETIRAcetam   Injectable 250 milliGRAM(s) IV Push every 12 hours  melatonin 3 milliGRAM(s) Oral at bedtime  memantine 5 milliGRAM(s) Oral two times a day  metoprolol tartrate Injectable 5 milliGRAM(s) IV Push every 6 hours  mometasone 110 MICROgram(s) Inhaler 2 Puff(s) Inhalation every 12 hours  pantoprazole  Injectable 40 milliGRAM(s) IV Push every 12 hours  ranolazine 500 milliGRAM(s) Oral two times a day  sodium chloride 0.45% with potassium chloride 20 mEq/L 1000 milliLiter(s) IV Continuous <Continuous>  sodium phosphate 30 milliMole(s)/500 mL IVPB 30 milliMole(s) IV Intermittent once  sucralfate 1 Gram(s) Oral every 12 hours  ticagrelor 60 milliGRAM(s) Enteral Tube every 12 hours      VITAL:  T(C): , Max: 37 (01-06-24 @ 21:06)  T(F): , Max: 98.6 (01-06-24 @ 21:06)  HR: 86 (01-07-24 @ 05:45)  BP: 180/82 (01-07-24 @ 05:45)  BP(mean): --  RR: 19 (01-07-24 @ 05:45)  SpO2: 95% (01-07-24 @ 05:45)  Wt(kg): --    01-06-24 @ 07:01  -  01-07-24 @ 07:00  --------------------------------------------------------  IN: 185 mL / OUT: 0 mL / NET: 185 mL        PHYSICAL EXAM:  General: NAD; Alert  HEENT:  NCAT; PERRLA, +NGT  Neck: No JVD; supple  Respiratory: b/l scattered rales   Cardiac: RRR s1s2  Gastrointestinal: BS+, soft, NT/ND  Urologic: No delong  Extremities: No peripheral edema  Access:     LABS:                          9.2    10.00 )-----------( 312      ( 07 Jan 2024 06:00 )             28.6     Na(137)/K(5.2)/Cl(109)/HCO3(19)/BUN(21)/Cr(1.35)Glu(301)/Ca(7.8)/Mg(2.20)/PO4(2.2)    01-07 @ 06:00  Na(143)/K(5.0)/Cl(111)/HCO3(20)/BUN(20)/Cr(1.49)Glu(195)/Ca(8.2)/Mg(1.90)/PO4(2.7)    01-06 @ 07:35  Na(142)/K(5.1)/Cl(111)/HCO3(19)/BUN(22)/Cr(1.45)Glu(219)/Ca(8.0)/Mg(1.90)/PO4(3.2)    01-05 @ 22:19  Na(140)/K(5.0)/Cl(111)/HCO3(16)/BUN(24)/Cr(1.41)Glu(199)/Ca(8.1)/Mg(1.90)/PO4(3.3)    01-05 @ 01:00    Urinalysis Basic - ( 07 Jan 2024 06:00 )    Color: x / Appearance: x / SG: x / pH: x  Gluc: 301 mg/dL / Ketone: x  / Bili: x / Urobili: x   Blood: x / Protein: x / Nitrite: x   Leuk Esterase: x / RBC: x / WBC x   Sq Epi: x / Non Sq Epi: x / Bacteria: x            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 no labs for today   S/p CT with contrast   2 Lytes- controlled .phos replated yesterday   3 Hypocalcemia corrected ~8.8mg/dl acceptable   4 CV - BP elevated, on Lopressor 5 mg IV q6h, and amlodipine 5 mg daily ; consider clonidine patch 0.1 if remained >140 mmhg systolic   5 Vasc - s/p SMA stent placement;   6 Sx - s/p HIDA + for acute asa, medical management, remains on tube feeding. puree diet   7 GI - s/p EGD clipping of bleeding duodenal ulcers   8 Anemia- hgb improving stable           [facility]  Office: [phone]

## 2024-01-09 NOTE — PROGRESS NOTE ADULT - SUBJECTIVE AND OBJECTIVE BOX
Surgery Daily Progress Note  =====================================================  SUBJECTIVE: A 89y Male Patient seen and examined at bedside on AM rounds. No acute vascular events overnight.    ALLERGIES:  fluoroquinolone antibiotics (Other)  Cipro (Unknown)  Tegretol (Unknown)  carbamazepine (Other)      --------------------------------------------------------------------------------------    MEDICATIONS:    Neurologic Medications  acetaminophen   IVPB .. 1000 milliGRAM(s) IV Intermittent every 6 hours PRN Mild Pain (1 - 3), Moderate Pain (4 - 6)  escitalopram 10 milliGRAM(s) Oral daily  gabapentin Solution 100 milliGRAM(s) Oral two times a day  levETIRAcetam   Injectable 250 milliGRAM(s) IV Push every 12 hours  melatonin 3 milliGRAM(s) Oral at bedtime  memantine 5 milliGRAM(s) Oral two times a day    Respiratory Medications  albuterol/ipratropium for Nebulization 3 milliLiter(s) Nebulizer every 12 hours  dornase cierra Solution 2.5 milliGRAM(s) Inhalation every 12 hours  mometasone 110 MICROgram(s) Inhaler 2 Puff(s) Inhalation every 12 hours    Cardiovascular Medications  amLODIPine   Tablet 5 milliGRAM(s) Oral daily  metoprolol tartrate Injectable 5 milliGRAM(s) IV Push every 6 hours  ranolazine 500 milliGRAM(s) Oral two times a day    Gastrointestinal Medications  pantoprazole  Injectable 40 milliGRAM(s) IV Push every 12 hours  sucralfate 1 Gram(s) Oral every 12 hours    Genitourinary Medications    Hematologic/Oncologic Medications  aspirin  chewable 81 milliGRAM(s) Oral daily  heparin   Injectable 5000 Unit(s) SubCutaneous every 8 hours  ticagrelor 60 milliGRAM(s) Enteral Tube every 12 hours    Antimicrobial/Immunologic Medications    Endocrine/Metabolic Medications  insulin glargine Injectable (LANTUS) 14 Unit(s) SubCutaneous at bedtime  insulin lispro (ADMELOG) corrective regimen sliding scale   SubCutaneous every 6 hours    Topical/Other Medications    --------------------------------------------------------------------------------------    VITAL SIGNS:  fluoroquinolone antibiotics (Other)  Cipro (Unknown)  Tegretol (Unknown)  carbamazepine (Other)      T(C): 36.3 (01-09-24 @ 09:58), Max: 36.9 (01-09-24 @ 06:00)  HR: 84 (01-09-24 @ 09:58) (78 - 88)  BP: 134/66 (01-09-24 @ 09:58) (120/55 - 167/69)  RR: 18 (01-09-24 @ 09:58) (17 - 18)  SpO2: 99% (01-09-24 @ 09:58) (94% - 100%)  --------------------------------------------------------------------------------------    EXAM    GEN: No acute distress   LUNGS: NC, lots of secretions with mild wheezing.   EXT: No peripheral edema. Regular range of motion. Palpable DP on b/l LE.    --------------------------------------------------------------------------------------    I&Os  T(C): 36.3 (01-09-24 @ 09:58), Max: 36.9 (01-09-24 @ 06:00)  HR: 84 (01-09-24 @ 09:58) (78 - 88)  BP: 134/66 (01-09-24 @ 09:58) (120/55 - 167/69)  RR: 18 (01-09-24 @ 09:58) (17 - 18)  SpO2: 99% (01-09-24 @ 09:58) (94% - 100%)  --------------------------------------------------------------------------------------    LABS                          8.0    6.48  )-----------( 284      ( 09 Jan 2024 08:45 )             25.7     01-09    140  |  108<H>  |  21  ----------------------------<  146<H>  5.3   |  25  |  1.33<H>    Ca    7.9<L>      09 Jan 2024 08:45  Phos  2.8     01-09  Mg     2.00     01-09    TPro  6.0  /  Alb  2.8<L>  /  TBili  0.4  /  DBili  x   /  AST  16  /  ALT  18  /  AlkPhos  78  01-09      Urinalysis Basic - ( 09 Jan 2024 08:45 )    Color: x / Appearance: x / SG: x / pH: x  Gluc: 146 mg/dL / Ketone: x  / Bili: x / Urobili: x   Blood: x / Protein: x / Nitrite: x   Leuk Esterase: x / RBC: x / WBC x   Sq Epi: x / Non Sq Epi: x / Bacteria: x        01-08-24 @ 07:01  -  01-09-24 @ 07:00  --------------------------------------------------------  IN: 400 mL / OUT: 400 mL / NET: 0 mL      acetaminophen   IVPB .. 1000 milliGRAM(s) IV Intermittent every 6 hours PRN  albuterol/ipratropium for Nebulization 3 milliLiter(s) Nebulizer every 12 hours  amLODIPine   Tablet 5 milliGRAM(s) Oral daily  aspirin  chewable 81 milliGRAM(s) Oral daily  dornase cierra Solution 2.5 milliGRAM(s) Inhalation every 12 hours  escitalopram 10 milliGRAM(s) Oral daily  gabapentin Solution 100 milliGRAM(s) Oral two times a day  heparin   Injectable 5000 Unit(s) SubCutaneous every 8 hours  insulin glargine Injectable (LANTUS) 14 Unit(s) SubCutaneous at bedtime  insulin lispro (ADMELOG) corrective regimen sliding scale   SubCutaneous every 6 hours  levETIRAcetam   Injectable 250 milliGRAM(s) IV Push every 12 hours  melatonin 3 milliGRAM(s) Oral at bedtime  memantine 5 milliGRAM(s) Oral two times a day  metoprolol tartrate Injectable 5 milliGRAM(s) IV Push every 6 hours  mometasone 110 MICROgram(s) Inhaler 2 Puff(s) Inhalation every 12 hours  pantoprazole  Injectable 40 milliGRAM(s) IV Push every 12 hours  ranolazine 500 milliGRAM(s) Oral two times a day  sucralfate 1 Gram(s) Oral every 12 hours  ticagrelor 60 milliGRAM(s) Enteral Tube every 12 hours      RADIOLOGY, EKG & ADDITIONAL TESTS: Reviewed.

## 2024-01-09 NOTE — PROGRESS NOTE ADULT - ASSESSMENT
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 CTA demonstrating mesenteric fat stranding associated with ascending/transverse colon. S/p SMA angiography with stent placement with diagnostic laparoscopy 12/24, small bowel and visible colon viable, some inflammation of omentum in RUQ. Course c/b GI bleed, s/p scope on 1/2 with GI with clips placed.     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 CTA demonstrating mesenteric fat stranding associated with ascending/transverse colon. S/p SMA angiography with stent placement with diagnostic laparoscopy 12/24, small bowel and visible colon viable, some inflammation of omentum in RUQ. Course c/b GI bleed, s/p scope on 1/2 with GI with clips placed.     Plan:   - Pain control PRN  - ASA, Brillinta, SQH  - Rest of care per medical team    C Team Surgery   [id number]

## 2024-01-09 NOTE — PROGRESS NOTE ADULT - ASSESSMENT
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 CTA demonstrating mesenteric fat stranding associated with ascending/transverse colon. S/p SMA angiography with stent placement with diagnostic laparoscopy 12/24, small bowel and visible colon viable, some inflammation of omentum in RUQ. Course c/b GI bleed, s/p scope on 1/2 with GI with clips placed. Patient transferred overnight from SICU to the floor in clinically stable condition.       Problem/Recommendation - 1:  ·  Problem: Type 2 diabetes mellitus with hyperglycemia.   ·  Recommendation: sugars better now.   Continue Lantus and SSI.     Problem/Recommendation - 2:  ·  Problem: Acute renal failure superimposed on chronic kidney disease.   ·  Recommendation: Creatinine improving      Problem/Recommendation - 3:  ·  Problem: CAD (coronary artery disease).   ·  Recommendation: S/P CABG and Stents. On DAPT and BB.  cardiology help appreciated.     Problem/Recommendation - 4:  ·  Problem: Essential hypertension.   ·  Recommendation: BP meds with hold parameters.     Problem/Recommendation - 5:  ·  Problem: GI bleed.   ·  Recommendation: On PPI.  S/P EGD.   Impression:          - NG tube removed before endoscopy                       - LA Grade B esophagitis.                       - Bleeding Dieulafoy's lesion of the posterior wall of                        the gastric body. This is likely the source of                        clinically significant bleeding. Hemostasis achieved                        using 2 MR conditional hemoclips.                       - Bleeding edematous mucosa and nonbleeding clean based                        gastric ulcers in the posterior wall of the gastric                        body. These are likely due to NG tube irritation.                        Hemostasis achieved using 1 MR conditional hemoclip.                       - Non-bleeding small clean based duodenal ulcer                       - No specimens collected.    < end of copied text >.     Problem/Recommendation - 6:  ·  Problem: Myoclonic jerking.   ·  Recommendation: On keppra.     Problem/Recommendation - 7:  ·  Problem: Dysphagia.   ·  Recommendation:  S/P  cineesophagogram r.  Puree diet       Problem/Recommendation - 8:  ·  Problem: Abdominal distension.   ·  Recommendation: S/P  SMA angiography with stent placement with diagnostic laparoscopy 12/24,  Vascular following.     Problem/Recommendation - 9:  ·  Problem: Anemia due to acute blood loss.   ·  Recommendation: S/P PRBC.   Will repeat hgb as low.      Problem/Recommendation - 10:  ·  Problem: Toxic metabolic encephalopathy.   ·  Recommendation: Mental status waxes and wanes .     Problem/Recommendation - 11:  ·  Problem: 2019 novel coronavirus disease (COVID-19).   ·  Recommendation: S/P Renmdisvir.     Problem/Recommendation - 12:  ·  Problem: Leg pain .   ·  Recommendation:  Doppler negative for  DVT.  On Gabapentin      D/W patient son and grand son. D/W ACP .

## 2024-01-09 NOTE — CONSULT NOTE ADULT - ASSESSMENT
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 Patient states that he has been having a dry cough for the past week, worsened over the past few days. Denies SOB with the cough, denies hemoptysis. Reports fevers at home to 101.5 with associated diaphoresis. Said that he has significant pinprick like b/l chest pain with his cough but denies any chest pain when not coughing or with ambulation. Also reports rhinorrhea and sore throat. Reports generalized weakness and poor appetite. States his daughter was visiting him over the week end last week and she was positive for COVID19. Patient tested positive for COVID19 2 days prior and was started on paxlovid as outpatient. Of note, family states that the patient has had worsening confusion at home over the past 6 months (becoming disoriented to time) but recently has been more confused, talking about seeing people that are not present.   On arrival to the ED his vitals were T 98 -> 99.2, P 80, /72, RR 18, ) sat 100% RA. His lab work showed leukocytosis with neutrophilia and bandemia, MABLE, mild hyponatremia, indeterminant but stable troponins, and elevated lactate to 2.3. His COVID19 swab was positive. His CXR showed bibasilar crackles.  (23 Dec 2023 22:51):  pt has been here for past two weeks and now pulm called fro excessive secretions   he is able to answer simple questions:  grand sons at bedside:     Covid / Resp failure/on 2 L of oxygen  :  CADS/P CABG  DM  CKD  HTN  CVA X 3    Covid / Resp failure/on 2 L of oxygen:  -he was treated for covid before:   -he has excessive secretions  -keep o2 sao2 above 90%  -add BD and mucomyst: ;  -add mucinex:   CADS/P CABG  -cont current medications   DM  monitor and control   CKD  -cont current meds   HTN   -controlled  CVA X 3  -doing ok :

## 2024-01-10 ENCOUNTER — TRANSCRIPTION ENCOUNTER (OUTPATIENT)
Age: 89
End: 2024-01-10

## 2024-01-10 LAB
ALBUMIN SERPL ELPH-MCNC: 2.8 G/DL — LOW (ref 3.3–5)
ALBUMIN SERPL ELPH-MCNC: 2.8 G/DL — LOW (ref 3.3–5)
ALP SERPL-CCNC: 73 U/L — SIGNIFICANT CHANGE UP (ref 40–120)
ALP SERPL-CCNC: 73 U/L — SIGNIFICANT CHANGE UP (ref 40–120)
ALT FLD-CCNC: 16 U/L — SIGNIFICANT CHANGE UP (ref 4–41)
ALT FLD-CCNC: 16 U/L — SIGNIFICANT CHANGE UP (ref 4–41)
ANION GAP SERPL CALC-SCNC: 11 MMOL/L — SIGNIFICANT CHANGE UP (ref 7–14)
ANION GAP SERPL CALC-SCNC: 11 MMOL/L — SIGNIFICANT CHANGE UP (ref 7–14)
AST SERPL-CCNC: 20 U/L — SIGNIFICANT CHANGE UP (ref 4–40)
AST SERPL-CCNC: 20 U/L — SIGNIFICANT CHANGE UP (ref 4–40)
BILIRUB SERPL-MCNC: 0.5 MG/DL — SIGNIFICANT CHANGE UP (ref 0.2–1.2)
BILIRUB SERPL-MCNC: 0.5 MG/DL — SIGNIFICANT CHANGE UP (ref 0.2–1.2)
BUN SERPL-MCNC: 20 MG/DL — SIGNIFICANT CHANGE UP (ref 7–23)
BUN SERPL-MCNC: 20 MG/DL — SIGNIFICANT CHANGE UP (ref 7–23)
CALCIUM SERPL-MCNC: 8.4 MG/DL — SIGNIFICANT CHANGE UP (ref 8.4–10.5)
CALCIUM SERPL-MCNC: 8.4 MG/DL — SIGNIFICANT CHANGE UP (ref 8.4–10.5)
CHLORIDE SERPL-SCNC: 105 MMOL/L — SIGNIFICANT CHANGE UP (ref 98–107)
CHLORIDE SERPL-SCNC: 105 MMOL/L — SIGNIFICANT CHANGE UP (ref 98–107)
CO2 SERPL-SCNC: 26 MMOL/L — SIGNIFICANT CHANGE UP (ref 22–31)
CO2 SERPL-SCNC: 26 MMOL/L — SIGNIFICANT CHANGE UP (ref 22–31)
CREAT SERPL-MCNC: 1.39 MG/DL — HIGH (ref 0.5–1.3)
CREAT SERPL-MCNC: 1.39 MG/DL — HIGH (ref 0.5–1.3)
EGFR: 48 ML/MIN/1.73M2 — LOW
EGFR: 48 ML/MIN/1.73M2 — LOW
GLUCOSE BLDC GLUCOMTR-MCNC: 112 MG/DL — HIGH (ref 70–99)
GLUCOSE BLDC GLUCOMTR-MCNC: 112 MG/DL — HIGH (ref 70–99)
GLUCOSE BLDC GLUCOMTR-MCNC: 125 MG/DL — HIGH (ref 70–99)
GLUCOSE BLDC GLUCOMTR-MCNC: 125 MG/DL — HIGH (ref 70–99)
GLUCOSE BLDC GLUCOMTR-MCNC: 236 MG/DL — HIGH (ref 70–99)
GLUCOSE BLDC GLUCOMTR-MCNC: 236 MG/DL — HIGH (ref 70–99)
GLUCOSE BLDC GLUCOMTR-MCNC: 86 MG/DL — SIGNIFICANT CHANGE UP (ref 70–99)
GLUCOSE BLDC GLUCOMTR-MCNC: 86 MG/DL — SIGNIFICANT CHANGE UP (ref 70–99)
GLUCOSE SERPL-MCNC: 144 MG/DL — HIGH (ref 70–99)
GLUCOSE SERPL-MCNC: 144 MG/DL — HIGH (ref 70–99)
HCT VFR BLD CALC: 27.4 % — LOW (ref 39–50)
HCT VFR BLD CALC: 27.4 % — LOW (ref 39–50)
HGB BLD-MCNC: 8.7 G/DL — LOW (ref 13–17)
HGB BLD-MCNC: 8.7 G/DL — LOW (ref 13–17)
MAGNESIUM SERPL-MCNC: 1.9 MG/DL — SIGNIFICANT CHANGE UP (ref 1.6–2.6)
MAGNESIUM SERPL-MCNC: 1.9 MG/DL — SIGNIFICANT CHANGE UP (ref 1.6–2.6)
MCHC RBC-ENTMCNC: 30.6 PG — SIGNIFICANT CHANGE UP (ref 27–34)
MCHC RBC-ENTMCNC: 30.6 PG — SIGNIFICANT CHANGE UP (ref 27–34)
MCHC RBC-ENTMCNC: 31.8 GM/DL — LOW (ref 32–36)
MCHC RBC-ENTMCNC: 31.8 GM/DL — LOW (ref 32–36)
MCV RBC AUTO: 96.5 FL — SIGNIFICANT CHANGE UP (ref 80–100)
MCV RBC AUTO: 96.5 FL — SIGNIFICANT CHANGE UP (ref 80–100)
NRBC # BLD: 0 /100 WBCS — SIGNIFICANT CHANGE UP (ref 0–0)
NRBC # BLD: 0 /100 WBCS — SIGNIFICANT CHANGE UP (ref 0–0)
NRBC # FLD: 0 K/UL — SIGNIFICANT CHANGE UP (ref 0–0)
NRBC # FLD: 0 K/UL — SIGNIFICANT CHANGE UP (ref 0–0)
PHOSPHATE SERPL-MCNC: 2.7 MG/DL — SIGNIFICANT CHANGE UP (ref 2.5–4.5)
PHOSPHATE SERPL-MCNC: 2.7 MG/DL — SIGNIFICANT CHANGE UP (ref 2.5–4.5)
PLATELET # BLD AUTO: 274 K/UL — SIGNIFICANT CHANGE UP (ref 150–400)
PLATELET # BLD AUTO: 274 K/UL — SIGNIFICANT CHANGE UP (ref 150–400)
POTASSIUM SERPL-MCNC: 4.5 MMOL/L — SIGNIFICANT CHANGE UP (ref 3.5–5.3)
POTASSIUM SERPL-MCNC: 4.5 MMOL/L — SIGNIFICANT CHANGE UP (ref 3.5–5.3)
POTASSIUM SERPL-SCNC: 4.5 MMOL/L — SIGNIFICANT CHANGE UP (ref 3.5–5.3)
POTASSIUM SERPL-SCNC: 4.5 MMOL/L — SIGNIFICANT CHANGE UP (ref 3.5–5.3)
PROT SERPL-MCNC: 6.7 G/DL — SIGNIFICANT CHANGE UP (ref 6–8.3)
PROT SERPL-MCNC: 6.7 G/DL — SIGNIFICANT CHANGE UP (ref 6–8.3)
RBC # BLD: 2.84 M/UL — LOW (ref 4.2–5.8)
RBC # BLD: 2.84 M/UL — LOW (ref 4.2–5.8)
RBC # FLD: 20.2 % — HIGH (ref 10.3–14.5)
RBC # FLD: 20.2 % — HIGH (ref 10.3–14.5)
SODIUM SERPL-SCNC: 142 MMOL/L — SIGNIFICANT CHANGE UP (ref 135–145)
SODIUM SERPL-SCNC: 142 MMOL/L — SIGNIFICANT CHANGE UP (ref 135–145)
WBC # BLD: 7.26 K/UL — SIGNIFICANT CHANGE UP (ref 3.8–10.5)
WBC # BLD: 7.26 K/UL — SIGNIFICANT CHANGE UP (ref 3.8–10.5)
WBC # FLD AUTO: 7.26 K/UL — SIGNIFICANT CHANGE UP (ref 3.8–10.5)
WBC # FLD AUTO: 7.26 K/UL — SIGNIFICANT CHANGE UP (ref 3.8–10.5)

## 2024-01-10 PROCEDURE — 93971 EXTREMITY STUDY: CPT | Mod: 26,LT

## 2024-01-10 RX ADMIN — Medication 5 MILLIGRAM(S): at 05:56

## 2024-01-10 RX ADMIN — AMLODIPINE BESYLATE 5 MILLIGRAM(S): 2.5 TABLET ORAL at 05:55

## 2024-01-10 RX ADMIN — Medication 4 MILLILITER(S): at 22:19

## 2024-01-10 RX ADMIN — INSULIN GLARGINE 14 UNIT(S): 100 INJECTION, SOLUTION SUBCUTANEOUS at 22:19

## 2024-01-10 RX ADMIN — LEVETIRACETAM 250 MILLIGRAM(S): 250 TABLET, FILM COATED ORAL at 06:03

## 2024-01-10 RX ADMIN — Medication 81 MILLIGRAM(S): at 12:43

## 2024-01-10 RX ADMIN — Medication 40 MILLIGRAM(S): at 05:58

## 2024-01-10 RX ADMIN — Medication 3 MILLILITER(S): at 15:23

## 2024-01-10 RX ADMIN — Medication 5 MILLIGRAM(S): at 18:40

## 2024-01-10 RX ADMIN — RANOLAZINE 500 MILLIGRAM(S): 500 TABLET, FILM COATED, EXTENDED RELEASE ORAL at 18:44

## 2024-01-10 RX ADMIN — LEVETIRACETAM 250 MILLIGRAM(S): 250 TABLET, FILM COATED ORAL at 18:41

## 2024-01-10 RX ADMIN — ESCITALOPRAM OXALATE 10 MILLIGRAM(S): 10 TABLET, FILM COATED ORAL at 12:43

## 2024-01-10 RX ADMIN — Medication 5 MILLIGRAM(S): at 01:02

## 2024-01-10 RX ADMIN — HEPARIN SODIUM 5000 UNIT(S): 5000 INJECTION INTRAVENOUS; SUBCUTANEOUS at 05:56

## 2024-01-10 RX ADMIN — DORNASE ALFA 2.5 MILLIGRAM(S): 1 SOLUTION RESPIRATORY (INHALATION) at 22:21

## 2024-01-10 RX ADMIN — MOMETASONE FUROATE 2 PUFF(S): 220 INHALANT RESPIRATORY (INHALATION) at 12:41

## 2024-01-10 RX ADMIN — MEMANTINE HYDROCHLORIDE 5 MILLIGRAM(S): 10 TABLET ORAL at 18:44

## 2024-01-10 RX ADMIN — PANTOPRAZOLE SODIUM 40 MILLIGRAM(S): 20 TABLET, DELAYED RELEASE ORAL at 18:41

## 2024-01-10 RX ADMIN — RANOLAZINE 500 MILLIGRAM(S): 500 TABLET, FILM COATED, EXTENDED RELEASE ORAL at 05:56

## 2024-01-10 RX ADMIN — GABAPENTIN 100 MILLIGRAM(S): 400 CAPSULE ORAL at 05:58

## 2024-01-10 RX ADMIN — Medication 4 MILLILITER(S): at 09:31

## 2024-01-10 RX ADMIN — Medication 3 MILLILITER(S): at 00:10

## 2024-01-10 RX ADMIN — PANTOPRAZOLE SODIUM 40 MILLIGRAM(S): 20 TABLET, DELAYED RELEASE ORAL at 05:55

## 2024-01-10 RX ADMIN — Medication 1 GRAM(S): at 18:41

## 2024-01-10 RX ADMIN — Medication 100 MILLIGRAM(S): at 22:54

## 2024-01-10 RX ADMIN — TICAGRELOR 60 MILLIGRAM(S): 90 TABLET ORAL at 22:56

## 2024-01-10 RX ADMIN — HEPARIN SODIUM 5000 UNIT(S): 5000 INJECTION INTRAVENOUS; SUBCUTANEOUS at 16:04

## 2024-01-10 RX ADMIN — Medication 3 MILLIGRAM(S): at 22:55

## 2024-01-10 RX ADMIN — TICAGRELOR 60 MILLIGRAM(S): 90 TABLET ORAL at 05:55

## 2024-01-10 RX ADMIN — Medication 1200 MILLIGRAM(S): at 05:56

## 2024-01-10 RX ADMIN — Medication 5 MILLIGRAM(S): at 12:43

## 2024-01-10 RX ADMIN — Medication 4 MILLILITER(S): at 15:27

## 2024-01-10 RX ADMIN — Medication 3 MILLILITER(S): at 22:20

## 2024-01-10 RX ADMIN — Medication 4 MILLILITER(S): at 00:10

## 2024-01-10 RX ADMIN — Medication 1 GRAM(S): at 05:56

## 2024-01-10 RX ADMIN — Medication 1200 MILLIGRAM(S): at 18:43

## 2024-01-10 RX ADMIN — Medication 4 MILLILITER(S): at 03:07

## 2024-01-10 RX ADMIN — GABAPENTIN 100 MILLIGRAM(S): 400 CAPSULE ORAL at 18:41

## 2024-01-10 RX ADMIN — Medication 3 MILLILITER(S): at 09:30

## 2024-01-10 RX ADMIN — MEMANTINE HYDROCHLORIDE 5 MILLIGRAM(S): 10 TABLET ORAL at 05:55

## 2024-01-10 RX ADMIN — HEPARIN SODIUM 5000 UNIT(S): 5000 INJECTION INTRAVENOUS; SUBCUTANEOUS at 22:55

## 2024-01-10 NOTE — CHART NOTE - NSCHARTNOTEFT_GEN_A_CORE
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 CTA demonstrating mesenteric fat stranding associated with ascending/transverse colon. S/p SMA angiography with stent placement with diagnostic laparoscopy 12/24, small bowel and visible colon viable, some inflammation of omentum in RUQ. Course c/b GI bleed, s/p scope on 1/2 with GI with clips placed. Patient transferred  from SICU to the floor in clinically stable condition.      Patient noted to require feeding assistance.  No GI distress reported i.e. nausea, vomiting, diarrhea. POCT glucose noted with improvement. Patient s/p cineesophagogram.      Source: [ ] Patient [ ] Family [ ] RN [ X ] Chart      Diet, Consistent Carbohydrate/No Snacks:   Pureed (PUREED)  Moderately Thick Liquids (MODTHICKLIQS) (01-08-24 @ 15:37)    GI: Fecal incontinence. Last BM noted on [ 1/8 ]    PO intake:  [ ] Poor < 50%  [ X ] Fair 50-75% [ ] Good  % [ ] Inconsistent PO intake    Enteral /Parenteral Nutrition:       [ X ] n/a    Inpatient Weights:  1/4 - 81.5kg      1/3 - 78.9kg      1/2 - 85.7kg      12/30 - 78.4kg      12/29 - 76.6kg      12/27 - 74.7kg      12/25 - 75.9kg    Edema: 2+ edema  noted to arms and legs per RN flowsheets     Pressure Injuries: No pressure ulcers/DTI noted in flowsheets.     _______________ Pertinent Medications_______________  MEDICATIONS  (STANDING):  acetylcysteine 20%  Inhalation 4 milliLiter(s) Inhalation every 6 hours  albuterol/ipratropium for Nebulization 3 milliLiter(s) Nebulizer every 12 hours  amLODIPine   Tablet 5 milliGRAM(s) Oral daily  aspirin  chewable 81 milliGRAM(s) Oral daily  dornase cierra Solution 2.5 milliGRAM(s) Inhalation every 12 hours  escitalopram 10 milliGRAM(s) Oral daily  furosemide   Injectable 40 milliGRAM(s) IV Push daily  gabapentin Solution 100 milliGRAM(s) Oral two times a day  guaiFENesin ER 1200 milliGRAM(s) Oral every 12 hours  heparin   Injectable 5000 Unit(s) SubCutaneous every 8 hours  insulin glargine Injectable (LANTUS) 14 Unit(s) SubCutaneous at bedtime  insulin lispro (ADMELOG) corrective regimen sliding scale   SubCutaneous at bedtime  insulin lispro (ADMELOG) corrective regimen sliding scale   SubCutaneous three times a day before meals  levETIRAcetam   Injectable 250 milliGRAM(s) IV Push every 12 hours  melatonin 3 milliGRAM(s) Oral at bedtime  memantine 5 milliGRAM(s) Oral two times a day  metoprolol tartrate Injectable 5 milliGRAM(s) IV Push every 6 hours  mometasone 110 MICROgram(s) Inhaler 2 Puff(s) Inhalation every 12 hours  pantoprazole  Injectable 40 milliGRAM(s) IV Push every 12 hours  ranolazine 500 milliGRAM(s) Oral two times a day  sucralfate 1 Gram(s) Oral every 12 hours  ticagrelor 60 milliGRAM(s) Enteral Tube every 12 hours    MEDICATIONS  (PRN):  acetaminophen   IVPB .. 1000 milliGRAM(s) IV Intermittent every 6 hours PRN Mild Pain (1 - 3), Moderate Pain (4 - 6)  albuterol/ipratropium for Nebulization 3 milliLiter(s) Nebulizer every 6 hours PRN Shortness of Breath and/or Wheezing      __________________ Pertinent Labs__________________   01-10 Na142 mmol/L Glu 144 mg/dL<H> K+ 4.5 mmol/L Cr  1.39 mg/dL<H> BUN 20 mg/dL 01-10 Phos 2.7 mg/dL 01-10 Alb 2.8 g/dL<L> 12-24 Chol 66 mg/dL LDL --    HDL 26 mg/dL<L> Trig 102 mg/dL    POCT Blood Glucose.: 219 mg/dL (01-09-24 @ 23:23)  POCT Blood Glucose.: 194 mg/dL (01-09-24 @ 21:12)  POCT Blood Glucose.: 121 mg/dL (01-09-24 @ 18:02)  POCT Blood Glucose.: 130 mg/dL (01-09-24 @ 12:32)  POCT Blood Glucose.: 126 mg/dL (01-09-24 @ 08:53)  POCT Blood Glucose.: 145 mg/dL (01-09-24 @ 00:41)  POCT Blood Glucose.: 188 mg/dL (01-08-24 @ 21:13)  POCT Blood Glucose.: 199 mg/dL (01-08-24 @ 18:04)  POCT Blood Glucose.: 273 mg/dL (01-08-24 @ 12:43)  POCT Blood Glucose.: 212 mg/dL (01-08-24 @ 01:01)  POCT Blood Glucose.: 249 mg/dL (01-07-24 @ 22:48)  POCT Blood Glucose.: 258 mg/dL (01-07-24 @ 18:17)  POCT Blood Glucose.: 258 mg/dL (01-07-24 @ 12:29)      Estimated Needs:   [X] no change since previous assessment  [ ] recalculated:     Previous Nutrition Diagnosis: Moderate Malnutrition    Nutrition Diagnosis is [ X ] ongoing  [ ] resolved [ ] not applicable     Monitoring and Evaluation:     [ x ] Tolerance to diet prescription / adequacy of meal intake  [ x ] Weight trends   [ x ] Pertinent labs    Recommendations:  1) Diet / Supplement Changes: Continue diet as ordered / tolerated; offer magic cup (no sugar added) and Mightyshakes (Moderately Thickened Reduced Sugar Fortified Shake, provides 200kcal, 7gm protein / 4 FL OZ) 1x day to optimize nutrition.   2) Obtain and record current weights to best monitor for acute changes in nutritional status.  3) Please consistently document % meal intake in nursing flowsheets.    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 CTA demonstrating mesenteric fat stranding associated with ascending/transverse colon. S/p SMA angiography with stent placement with diagnostic laparoscopy 12/24, small bowel and visible colon viable, some inflammation of omentum in RUQ. Course c/b GI bleed, s/p scope on 1/2 with GI with clips placed. Patient transferred  from SICU to the floor in clinically stable condition.      Patient noted to require feeding assistance.  No GI distress reported i.e. nausea, vomiting, diarrhea. POCT glucose noted with improvement. Patient s/p cineesophagogram.      Source: [ ] Patient [ ] Family [ ] RN [ X ] Chart      Diet, Consistent Carbohydrate/No Snacks:   Pureed (PUREED)  Moderately Thick Liquids (MODTHICKLIQS) (01-08-24 @ 15:37)    GI: Fecal incontinence. Last BM noted on [ 1/8 ]    PO intake:  [ ] Poor < 50%  [ X ] Fair 50-75% [ ] Good  % [ ] Inconsistent PO intake    Enteral /Parenteral Nutrition:       [ X ] n/a    Inpatient Weights:  1/4 - 81.5kg      1/3 - 78.9kg      1/2 - 85.7kg      12/30 - 78.4kg      12/29 - 76.6kg      12/27 - 74.7kg      12/25 - 75.9kg    Edema: 2+ edema  noted to arms and legs per RN flowsheets     Pressure Injuries: No pressure ulcers/DTI noted in flowsheets.     _______________ Pertinent Medications_______________  MEDICATIONS  (STANDING):  acetylcysteine 20%  Inhalation 4 milliLiter(s) Inhalation every 6 hours  albuterol/ipratropium for Nebulization 3 milliLiter(s) Nebulizer every 12 hours  amLODIPine   Tablet 5 milliGRAM(s) Oral daily  aspirin  chewable 81 milliGRAM(s) Oral daily  dornase cierra Solution 2.5 milliGRAM(s) Inhalation every 12 hours  escitalopram 10 milliGRAM(s) Oral daily  furosemide   Injectable 40 milliGRAM(s) IV Push daily  gabapentin Solution 100 milliGRAM(s) Oral two times a day  guaiFENesin ER 1200 milliGRAM(s) Oral every 12 hours  heparin   Injectable 5000 Unit(s) SubCutaneous every 8 hours  insulin glargine Injectable (LANTUS) 14 Unit(s) SubCutaneous at bedtime  insulin lispro (ADMELOG) corrective regimen sliding scale   SubCutaneous at bedtime  insulin lispro (ADMELOG) corrective regimen sliding scale   SubCutaneous three times a day before meals  levETIRAcetam   Injectable 250 milliGRAM(s) IV Push every 12 hours  melatonin 3 milliGRAM(s) Oral at bedtime  memantine 5 milliGRAM(s) Oral two times a day  metoprolol tartrate Injectable 5 milliGRAM(s) IV Push every 6 hours  mometasone 110 MICROgram(s) Inhaler 2 Puff(s) Inhalation every 12 hours  pantoprazole  Injectable 40 milliGRAM(s) IV Push every 12 hours  ranolazine 500 milliGRAM(s) Oral two times a day  sucralfate 1 Gram(s) Oral every 12 hours  ticagrelor 60 milliGRAM(s) Enteral Tube every 12 hours    MEDICATIONS  (PRN):  acetaminophen   IVPB .. 1000 milliGRAM(s) IV Intermittent every 6 hours PRN Mild Pain (1 - 3), Moderate Pain (4 - 6)  albuterol/ipratropium for Nebulization 3 milliLiter(s) Nebulizer every 6 hours PRN Shortness of Breath and/or Wheezing      __________________ Pertinent Labs__________________   01-10 Na142 mmol/L Glu 144 mg/dL<H> K+ 4.5 mmol/L Cr  1.39 mg/dL<H> BUN 20 mg/dL 01-10 Phos 2.7 mg/dL 01-10 Alb 2.8 g/dL<L> 12-24 Chol 66 mg/dL LDL --    HDL 26 mg/dL<L> Trig 102 mg/dL    POCT Blood Glucose.: 219 mg/dL (01-09-24 @ 23:23)  POCT Blood Glucose.: 194 mg/dL (01-09-24 @ 21:12)  POCT Blood Glucose.: 121 mg/dL (01-09-24 @ 18:02)  POCT Blood Glucose.: 130 mg/dL (01-09-24 @ 12:32)  POCT Blood Glucose.: 126 mg/dL (01-09-24 @ 08:53)  POCT Blood Glucose.: 145 mg/dL (01-09-24 @ 00:41)  POCT Blood Glucose.: 188 mg/dL (01-08-24 @ 21:13)  POCT Blood Glucose.: 199 mg/dL (01-08-24 @ 18:04)  POCT Blood Glucose.: 273 mg/dL (01-08-24 @ 12:43)  POCT Blood Glucose.: 212 mg/dL (01-08-24 @ 01:01)  POCT Blood Glucose.: 249 mg/dL (01-07-24 @ 22:48)  POCT Blood Glucose.: 258 mg/dL (01-07-24 @ 18:17)  POCT Blood Glucose.: 258 mg/dL (01-07-24 @ 12:29)      Estimated Needs:   [X] no change since previous assessment  [ ] recalculated:     Previous Nutrition Diagnosis: Moderate Malnutrition    Nutrition Diagnosis is [ X ] ongoing  [ ] resolved [ ] not applicable     Monitoring and Evaluation:     [ x ] Tolerance to diet prescription / adequacy of meal intake  [ x ] Weight trends   [ x ] Pertinent labs    Recommendations:  1) Diet / Supplement Changes: Continue diet as ordered / tolerated; offer magic cup (no sugar added) and Mightyshakes (Moderately Thickened Reduced Sugar Fortified Shake, provides 200kcal, 7gm protein / 4 FL OZ) 1x day to optimize nutrition.   2) Obtain and record current weights to best monitor for acute changes in nutritional status.  3) Please consistently document % meal intake in nursing flowsheets.    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 CTA demonstrating mesenteric fat stranding associated with ascending/transverse colon. S/p SMA angiography with stent placement with diagnostic laparoscopy 12/24, small bowel and visible colon viable, some inflammation of omentum in RUQ. Course c/b GI bleed, s/p scope on 1/2 with GI with clips placed. Patient transferred  from SICU to the floor in clinically stable condition.    Patient noted to require feeding assistance.  No GI distress reported i.e. nausea, vomiting, diarrhea. POCT glucose noted with improvement. Patient s/p cineesophagogram 1/8/24 - recommended pureed diet with moderately thickened liquids .  Patient unable to participate in RD visit at this time.     Source: [ ] Patient [ ] Family [ ] RN [ X ] Chart      Diet, Consistent Carbohydrate/No Snacks:   Pureed (PUREED)  Moderately Thick Liquids (MODTHICKLIQS) (01-08-24 @ 15:37)    GI: Fecal incontinence. Last BM noted on [ 1/8 ]    PO intake:  [ ] Poor < 50%  [ X ] Fair 50-75% [ ] Good  % [ ] Inconsistent PO intake    Enteral /Parenteral Nutrition:       [ X ] n/a    Inpatient Weights:  1/4 - 81.5kg      1/3 - 78.9kg      1/2 - 85.7kg      12/30 - 78.4kg      12/29 - 76.6kg      12/27 - 74.7kg      12/25 - 75.9kg    Edema: 2+ edema  noted to arms and legs per RN flowsheets     Pressure Injuries: No pressure ulcers/DTI noted in flowsheets.     _______________ Pertinent Medications_______________  MEDICATIONS  (STANDING):  acetylcysteine 20%  Inhalation 4 milliLiter(s) Inhalation every 6 hours  albuterol/ipratropium for Nebulization 3 milliLiter(s) Nebulizer every 12 hours  amLODIPine   Tablet 5 milliGRAM(s) Oral daily  aspirin  chewable 81 milliGRAM(s) Oral daily  dornase cierra Solution 2.5 milliGRAM(s) Inhalation every 12 hours  escitalopram 10 milliGRAM(s) Oral daily  furosemide   Injectable 40 milliGRAM(s) IV Push daily  gabapentin Solution 100 milliGRAM(s) Oral two times a day  guaiFENesin ER 1200 milliGRAM(s) Oral every 12 hours  heparin   Injectable 5000 Unit(s) SubCutaneous every 8 hours  insulin glargine Injectable (LANTUS) 14 Unit(s) SubCutaneous at bedtime  insulin lispro (ADMELOG) corrective regimen sliding scale   SubCutaneous at bedtime  insulin lispro (ADMELOG) corrective regimen sliding scale   SubCutaneous three times a day before meals  levETIRAcetam   Injectable 250 milliGRAM(s) IV Push every 12 hours  melatonin 3 milliGRAM(s) Oral at bedtime  memantine 5 milliGRAM(s) Oral two times a day  metoprolol tartrate Injectable 5 milliGRAM(s) IV Push every 6 hours  mometasone 110 MICROgram(s) Inhaler 2 Puff(s) Inhalation every 12 hours  pantoprazole  Injectable 40 milliGRAM(s) IV Push every 12 hours  ranolazine 500 milliGRAM(s) Oral two times a day  sucralfate 1 Gram(s) Oral every 12 hours  ticagrelor 60 milliGRAM(s) Enteral Tube every 12 hours    MEDICATIONS  (PRN):  acetaminophen   IVPB .. 1000 milliGRAM(s) IV Intermittent every 6 hours PRN Mild Pain (1 - 3), Moderate Pain (4 - 6)  albuterol/ipratropium for Nebulization 3 milliLiter(s) Nebulizer every 6 hours PRN Shortness of Breath and/or Wheezing      __________________ Pertinent Labs__________________   01-10 Na142 mmol/L Glu 144 mg/dL<H> K+ 4.5 mmol/L Cr  1.39 mg/dL<H> BUN 20 mg/dL 01-10 Phos 2.7 mg/dL 01-10 Alb 2.8 g/dL<L> 12-24 Chol 66 mg/dL LDL --    HDL 26 mg/dL<L> Trig 102 mg/dL    POCT Blood Glucose.: 219 mg/dL (01-09-24 @ 23:23)  POCT Blood Glucose.: 194 mg/dL (01-09-24 @ 21:12)  POCT Blood Glucose.: 121 mg/dL (01-09-24 @ 18:02)  POCT Blood Glucose.: 130 mg/dL (01-09-24 @ 12:32)  POCT Blood Glucose.: 126 mg/dL (01-09-24 @ 08:53)  POCT Blood Glucose.: 145 mg/dL (01-09-24 @ 00:41)  POCT Blood Glucose.: 188 mg/dL (01-08-24 @ 21:13)  POCT Blood Glucose.: 199 mg/dL (01-08-24 @ 18:04)  POCT Blood Glucose.: 273 mg/dL (01-08-24 @ 12:43)  POCT Blood Glucose.: 212 mg/dL (01-08-24 @ 01:01)  POCT Blood Glucose.: 249 mg/dL (01-07-24 @ 22:48)  POCT Blood Glucose.: 258 mg/dL (01-07-24 @ 18:17)  POCT Blood Glucose.: 258 mg/dL (01-07-24 @ 12:29)    Estimated Needs:   [X] no change since previous assessment  [ ] recalculated:     Previous Nutrition Diagnosis: Moderate Malnutrition    Nutrition Diagnosis is [ X ] ongoing  [ ] resolved [ ] not applicable     Monitoring and Evaluation:     [ x ] Tolerance to diet prescription / adequacy of meal intake  [ x ] Weight trends   [ x ] Pertinent labs    Recommendations:  1) Diet / Supplement Changes: Continue diet as ordered / tolerated; offer magic cup (no sugar added) and Mightyshakes (Moderately Thickened Reduced Sugar Fortified Shake, provides 200kcal, 7gm protein / 4 FL OZ) 1x day to optimize nutrition.   2) Obtain and record current weights to best monitor for acute changes in nutritional status.  3) Please consistently document % meal intake in nursing flowsheets.    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 CTA demonstrating mesenteric fat stranding associated with ascending/transverse colon. S/p SMA angiography with stent placement with diagnostic laparoscopy 12/24, small bowel and visible colon viable, some inflammation of omentum in RUQ. Course c/b GI bleed, s/p scope on 1/2 with GI with clips placed. Patient transferred  from SICU to the floor in clinically stable condition.    Patient noted to require feeding assistance.  No GI distress reported i.e. nausea, vomiting, diarrhea. POCT glucose noted with improvement. Patient s/p cineesophagogram 1/8/24 - recommended pureed diet with moderately thickened liquids .  Patient unable to participate in RD visit at this time.     Source: [ ] Patient [ ] Family [ ] RN [ X ] Chart      Diet, Consistent Carbohydrate/No Snacks:   Pureed (PUREED)  Moderately Thick Liquids (MODTHICKLIQS) (01-08-24 @ 15:37)    GI: Fecal incontinence. Last BM noted on [ 1/8 ]    PO intake:  [ ] Poor < 50%  [ X ] Fair 50-75% [ ] Good  % [ ] Inconsistent PO intake    Enteral /Parenteral Nutrition:       [ X ] n/a    Inpatient Weights:  1/4 - 81.5kg      1/3 - 78.9kg      1/2 - 85.7kg      12/30 - 78.4kg      12/29 - 76.6kg      12/27 - 74.7kg      12/25 - 75.9kg    Edema: 2+ edema  noted to arms and legs per RN flowsheets     Pressure Injuries: No pressure ulcers/DTI noted in flowsheets.     _______________ Pertinent Medications_______________  MEDICATIONS  (STANDING):  acetylcysteine 20%  Inhalation 4 milliLiter(s) Inhalation every 6 hours  albuterol/ipratropium for Nebulization 3 milliLiter(s) Nebulizer every 12 hours  amLODIPine   Tablet 5 milliGRAM(s) Oral daily  aspirin  chewable 81 milliGRAM(s) Oral daily  dornase cierra Solution 2.5 milliGRAM(s) Inhalation every 12 hours  escitalopram 10 milliGRAM(s) Oral daily  furosemide   Injectable 40 milliGRAM(s) IV Push daily  gabapentin Solution 100 milliGRAM(s) Oral two times a day  guaiFENesin ER 1200 milliGRAM(s) Oral every 12 hours  heparin   Injectable 5000 Unit(s) SubCutaneous every 8 hours  insulin glargine Injectable (LANTUS) 14 Unit(s) SubCutaneous at bedtime  insulin lispro (ADMELOG) corrective regimen sliding scale   SubCutaneous at bedtime  insulin lispro (ADMELOG) corrective regimen sliding scale   SubCutaneous three times a day before meals  levETIRAcetam   Injectable 250 milliGRAM(s) IV Push every 12 hours  melatonin 3 milliGRAM(s) Oral at bedtime  memantine 5 milliGRAM(s) Oral two times a day  metoprolol tartrate Injectable 5 milliGRAM(s) IV Push every 6 hours  mometasone 110 MICROgram(s) Inhaler 2 Puff(s) Inhalation every 12 hours  pantoprazole  Injectable 40 milliGRAM(s) IV Push every 12 hours  ranolazine 500 milliGRAM(s) Oral two times a day  sucralfate 1 Gram(s) Oral every 12 hours  ticagrelor 60 milliGRAM(s) Enteral Tube every 12 hours    MEDICATIONS  (PRN):  acetaminophen   IVPB .. 1000 milliGRAM(s) IV Intermittent every 6 hours PRN Mild Pain (1 - 3), Moderate Pain (4 - 6)  albuterol/ipratropium for Nebulization 3 milliLiter(s) Nebulizer every 6 hours PRN Shortness of Breath and/or Wheezing      __________________ Pertinent Labs__________________   01-10 Na142 mmol/L Glu 144 mg/dL<H> K+ 4.5 mmol/L Cr  1.39 mg/dL<H> BUN 20 mg/dL 01-10 Phos 2.7 mg/dL 01-10 Alb 2.8 g/dL<L> 12-24 Chol 66 mg/dL LDL --    HDL 26 mg/dL<L> Trig 102 mg/dL    POCT Blood Glucose.: 219 mg/dL (01-09-24 @ 23:23)  POCT Blood Glucose.: 194 mg/dL (01-09-24 @ 21:12)  POCT Blood Glucose.: 121 mg/dL (01-09-24 @ 18:02)  POCT Blood Glucose.: 130 mg/dL (01-09-24 @ 12:32)  POCT Blood Glucose.: 126 mg/dL (01-09-24 @ 08:53)  POCT Blood Glucose.: 145 mg/dL (01-09-24 @ 00:41)  POCT Blood Glucose.: 188 mg/dL (01-08-24 @ 21:13)  POCT Blood Glucose.: 199 mg/dL (01-08-24 @ 18:04)  POCT Blood Glucose.: 273 mg/dL (01-08-24 @ 12:43)  POCT Blood Glucose.: 212 mg/dL (01-08-24 @ 01:01)  POCT Blood Glucose.: 249 mg/dL (01-07-24 @ 22:48)  POCT Blood Glucose.: 258 mg/dL (01-07-24 @ 18:17)  POCT Blood Glucose.: 258 mg/dL (01-07-24 @ 12:29)    Estimated Needs:   [X] no change since previous assessment  [ ] recalculated:     Previous Nutrition Diagnosis: Moderate Malnutrition    Nutrition Diagnosis is [ X ] ongoing  [ ] resolved [ ] not applicable     Monitoring and Evaluation:     [ x ] Tolerance to diet prescription / adequacy of meal intake  [ x ] Weight trends   [ x ] Pertinent labs    Recommendations:  1) Diet / Supplement Changes: Continue diet as ordered / tolerated; offer magic cup (no sugar added) and Mightyshakes (Moderately Thickened Reduced Sugar Fortified Shake, provides 200kcal, 7gm protein / 4 FL OZ) 1x day to optimize nutrition.   2) Obtain and record current weights to best monitor for acute changes in nutritional status.  3) Please consistently document % meal intake in nursing flowsheets.    Nina Parker, MS, RDN, CDN  Pager [phone]  Also available on MS Teams

## 2024-01-10 NOTE — PROGRESS NOTE ADULT - ASSESSMENT
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 CTA demonstrating mesenteric fat stranding associated with ascending/transverse colon. S/p SMA angiography with stent placement with diagnostic laparoscopy 12/24, small bowel and visible colon viable, some inflammation of omentum in RUQ. Course c/b GI bleed, s/p scope on 1/2 with GI with clips placed.     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 CTA demonstrating mesenteric fat stranding associated with ascending/transverse colon. S/p SMA angiography with stent placement with diagnostic laparoscopy 12/24, small bowel and visible colon viable, some inflammation of omentum in RUQ. Course c/b GI bleed, s/p scope on 1/2 with GI with clips placed.     Plan:   - Pain control PRN  - ASA, Brillinta, SQH  - Rest of care per medical team    C Team Surgery   [id number]

## 2024-01-10 NOTE — PROGRESS NOTE ADULT - ASSESSMENT
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     EKG SR RBBB (old per family     1) CAD s/p CABG  -p/w chest pain. EKG non ischemic , trop -sera   - echo with normal lv and moderate AS   - c/w metoprolol   - chest pains  Trop mildly elevated and trending down likley sec to kidney disease,    -1/6 c/o of SOB  ?aspiration NGT in place. CXR Moderate bilateral pleural effusions f/u pulm recs also with anasarca started on IV lasix 40mg daily     2) Covid +  - s/p remdesivir   - c/w supportive management   - t/t per primary team     3) Abd distension   -CTA AP obtained which showed  partially occlusive calcified and non-calcified plaque just distal to take-off of the SMA, with estimated at least 75% luminal narrowing. Limited evaluation of its distal branches. Patient taken emergently to OR on 12/24 s/p diagnostic laparoscopy and SMA stent placement with brachial cutdown  -Surgery/vascular on board , f/u recs  - HIDA scan + for acute asa, medical management as poor surgical candidate cont zosyn  - asa and Brilliant restarted      4) UGIB  -GI on board s/p  EGD 1/2/24 which revealed bleeding vessel (likely Dieulafoy) s/p clipping and NGT trauma s/p clipping, fundus not fully visualized 2/2 clot, minute Tushar III lesion in duodenum.   -on Protonix IV q 12

## 2024-01-10 NOTE — PROGRESS NOTE ADULT - ASSESSMENT
on distress   tube feeding with puree  diet     acetaminophen   IVPB .. 1000 milliGRAM(s) IV Intermittent every 6 hours PRN  albuterol/ipratropium for Nebulization 3 milliLiter(s) Nebulizer every 12 hours  amLODIPine   Tablet 5 milliGRAM(s) Oral daily  aspirin  chewable 81 milliGRAM(s) Oral daily  dornase cierra Solution 2.5 milliGRAM(s) Inhalation every 12 hours  escitalopram 10 milliGRAM(s) Oral daily  gabapentin Solution 100 milliGRAM(s) Oral two times a day  heparin   Injectable 5000 Unit(s) SubCutaneous every 8 hours  insulin glargine Injectable (LANTUS) 14 Unit(s) SubCutaneous at bedtime  insulin lispro (ADMELOG) corrective regimen sliding scale   SubCutaneous every 6 hours  levETIRAcetam   Injectable 250 milliGRAM(s) IV Push every 12 hours  melatonin 3 milliGRAM(s) Oral at bedtime  memantine 5 milliGRAM(s) Oral two times a day  metoprolol tartrate Injectable 5 milliGRAM(s) IV Push every 6 hours  mometasone 110 MICROgram(s) Inhaler 2 Puff(s) Inhalation every 12 hours  pantoprazole  Injectable 40 milliGRAM(s) IV Push every 12 hours  ranolazine 500 milliGRAM(s) Oral two times a day  sodium chloride 0.45% with potassium chloride 20 mEq/L 1000 milliLiter(s) IV Continuous <Continuous>  sodium phosphate 30 milliMole(s)/500 mL IVPB 30 milliMole(s) IV Intermittent once  sucralfate 1 Gram(s) Oral every 12 hours  ticagrelor 60 milliGRAM(s) Enteral Tube every 12 hours      VITAL:  T(C): , Max: 37 (01-06-24 @ 21:06)  T(F): , Max: 98.6 (01-06-24 @ 21:06)  HR: 86 (01-07-24 @ 05:45)  BP: 180/82 (01-07-24 @ 05:45)  BP(mean): --  RR: 19 (01-07-24 @ 05:45)  SpO2: 95% (01-07-24 @ 05:45)  Wt(kg): --    01-06-24 @ 07:01  -  01-07-24 @ 07:00  --------------------------------------------------------  IN: 185 mL / OUT: 0 mL / NET: 185 mL        PHYSICAL EXAM:  General: NAD; Alert  HEENT:  NCAT; PERRLA, +NGT  Neck: No JVD; supple  Respiratory: b/l scattered rales   Cardiac: RRR s1s2  Gastrointestinal: BS+, soft, NT/ND  Urologic: No delong  Extremities: No peripheral edema  Access:     LABS:                          9.2    10.00 )-----------( 312      ( 07 Jan 2024 06:00 )             28.6     Na(137)/K(5.2)/Cl(109)/HCO3(19)/BUN(21)/Cr(1.35)Glu(301)/Ca(7.8)/Mg(2.20)/PO4(2.2)    01-07 @ 06:00  Na(143)/K(5.0)/Cl(111)/HCO3(20)/BUN(20)/Cr(1.49)Glu(195)/Ca(8.2)/Mg(1.90)/PO4(2.7)    01-06 @ 07:35  Na(142)/K(5.1)/Cl(111)/HCO3(19)/BUN(22)/Cr(1.45)Glu(219)/Ca(8.0)/Mg(1.90)/PO4(3.2)    01-05 @ 22:19  Na(140)/K(5.0)/Cl(111)/HCO3(16)/BUN(24)/Cr(1.41)Glu(199)/Ca(8.1)/Mg(1.90)/PO4(3.3)    01-05 @ 01:00    Urinalysis Basic - ( 07 Jan 2024 06:00 )    Color: x / Appearance: x / SG: x / pH: x  Gluc: 301 mg/dL / Ketone: x  / Bili: x / Urobili: x   Blood: x / Protein: x / Nitrite: x   Leuk Esterase: x / RBC: x / WBC x   Sq Epi: x / Non Sq Epi: x / Bacteria: x            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 no labs for today   S/p CT with contrast   2 Lytes- controlled .phos replated yesterday   3 Hypocalcemia corrected ~8.8mg/dl acceptable   4 CV - BP elevated, on Lopressor 5 mg IV q6h, and amlodipine 5 mg daily ; consider clonidine patch 0.1 if remained >140 mmhg systolic   5 Vasc - s/p SMA stent placement;   6 Sx - s/p HIDA + for acute asa, medical management, remains on tube feeding , add FREE water 100 cc q4h to tube feed   7 GI - s/p EGD clipping of bleeding duodenal ulcers   8 Anemia- hgb stable           [facility]  Office: [phone]     on distress   tube feeding with puree  diet     acetaminophen   IVPB .. 1000 milliGRAM(s) IV Intermittent every 6 hours PRN  albuterol/ipratropium for Nebulization 3 milliLiter(s) Nebulizer every 12 hours  amLODIPine   Tablet 5 milliGRAM(s) Oral daily  aspirin  chewable 81 milliGRAM(s) Oral daily  dornase cierra Solution 2.5 milliGRAM(s) Inhalation every 12 hours  escitalopram 10 milliGRAM(s) Oral daily  gabapentin Solution 100 milliGRAM(s) Oral two times a day  heparin   Injectable 5000 Unit(s) SubCutaneous every 8 hours  insulin glargine Injectable (LANTUS) 14 Unit(s) SubCutaneous at bedtime  insulin lispro (ADMELOG) corrective regimen sliding scale   SubCutaneous every 6 hours  levETIRAcetam   Injectable 250 milliGRAM(s) IV Push every 12 hours  melatonin 3 milliGRAM(s) Oral at bedtime  memantine 5 milliGRAM(s) Oral two times a day  metoprolol tartrate Injectable 5 milliGRAM(s) IV Push every 6 hours  mometasone 110 MICROgram(s) Inhaler 2 Puff(s) Inhalation every 12 hours  pantoprazole  Injectable 40 milliGRAM(s) IV Push every 12 hours  ranolazine 500 milliGRAM(s) Oral two times a day  sodium chloride 0.45% with potassium chloride 20 mEq/L 1000 milliLiter(s) IV Continuous <Continuous>  sodium phosphate 30 milliMole(s)/500 mL IVPB 30 milliMole(s) IV Intermittent once  sucralfate 1 Gram(s) Oral every 12 hours  ticagrelor 60 milliGRAM(s) Enteral Tube every 12 hours      VITAL:  T(C): , Max: 37 (01-06-24 @ 21:06)  T(F): , Max: 98.6 (01-06-24 @ 21:06)  HR: 86 (01-07-24 @ 05:45)  BP: 180/82 (01-07-24 @ 05:45)  BP(mean): --  RR: 19 (01-07-24 @ 05:45)  SpO2: 95% (01-07-24 @ 05:45)  Wt(kg): --    01-06-24 @ 07:01  -  01-07-24 @ 07:00  --------------------------------------------------------  IN: 185 mL / OUT: 0 mL / NET: 185 mL        PHYSICAL EXAM:  General: NAD; Alert  HEENT:  NCAT; PERRLA, +NGT  Neck: No JVD; supple  Respiratory: b/l scattered rales   Cardiac: RRR s1s2  Gastrointestinal: BS+, soft, NT/ND  Urologic: No delong  Extremities: No peripheral edema  Access:     LABS:                          9.2    10.00 )-----------( 312      ( 07 Jan 2024 06:00 )             28.6     Na(137)/K(5.2)/Cl(109)/HCO3(19)/BUN(21)/Cr(1.35)Glu(301)/Ca(7.8)/Mg(2.20)/PO4(2.2)    01-07 @ 06:00  Na(143)/K(5.0)/Cl(111)/HCO3(20)/BUN(20)/Cr(1.49)Glu(195)/Ca(8.2)/Mg(1.90)/PO4(2.7)    01-06 @ 07:35  Na(142)/K(5.1)/Cl(111)/HCO3(19)/BUN(22)/Cr(1.45)Glu(219)/Ca(8.0)/Mg(1.90)/PO4(3.2)    01-05 @ 22:19  Na(140)/K(5.0)/Cl(111)/HCO3(16)/BUN(24)/Cr(1.41)Glu(199)/Ca(8.1)/Mg(1.90)/PO4(3.3)    01-05 @ 01:00    Urinalysis Basic - ( 07 Jan 2024 06:00 )    Color: x / Appearance: x / SG: x / pH: x  Gluc: 301 mg/dL / Ketone: x  / Bili: x / Urobili: x   Blood: x / Protein: x / Nitrite: x   Leuk Esterase: x / RBC: x / WBC x   Sq Epi: x / Non Sq Epi: x / Bacteria: x            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 no labs for today   S/p CT with contrast   2 Lytes- controlled .phos replated yesterday   3 Hypocalcemia corrected ~8.8mg/dl acceptable   4 CV - BP elevated, on Lopressor 5 mg IV q6h, and amlodipine 5 mg daily ; consider clonidine patch 0.1 if remained >140 mmhg systolic   5 Vasc - s/p SMA stent placement;   6 Sx - s/p HIDA + for acute asa, medical management, remains on tube feeding , add FREE water 100 cc q4h to tube feed   7 GI - s/p EGD clipping of bleeding duodenal ulcers   8 Anemia- hgb stable           [facility]  Office: [phone]     on distress   on  puree  diet   family at bedside  acetaminophen   IVPB .. 1000 milliGRAM(s) IV Intermittent every 6 hours PRN  albuterol/ipratropium for Nebulization 3 milliLiter(s) Nebulizer every 12 hours  amLODIPine   Tablet 5 milliGRAM(s) Oral daily  aspirin  chewable 81 milliGRAM(s) Oral daily  dornase cierra Solution 2.5 milliGRAM(s) Inhalation every 12 hours  escitalopram 10 milliGRAM(s) Oral daily  gabapentin Solution 100 milliGRAM(s) Oral two times a day  heparin   Injectable 5000 Unit(s) SubCutaneous every 8 hours  insulin glargine Injectable (LANTUS) 14 Unit(s) SubCutaneous at bedtime  insulin lispro (ADMELOG) corrective regimen sliding scale   SubCutaneous every 6 hours  levETIRAcetam   Injectable 250 milliGRAM(s) IV Push every 12 hours  melatonin 3 milliGRAM(s) Oral at bedtime  memantine 5 milliGRAM(s) Oral two times a day  metoprolol tartrate Injectable 5 milliGRAM(s) IV Push every 6 hours  mometasone 110 MICROgram(s) Inhaler 2 Puff(s) Inhalation every 12 hours  pantoprazole  Injectable 40 milliGRAM(s) IV Push every 12 hours  ranolazine 500 milliGRAM(s) Oral two times a day  sodium chloride 0.45% with potassium chloride 20 mEq/L 1000 milliLiter(s) IV Continuous <Continuous>  sodium phosphate 30 milliMole(s)/500 mL IVPB 30 milliMole(s) IV Intermittent once  sucralfate 1 Gram(s) Oral every 12 hours  ticagrelor 60 milliGRAM(s) Enteral Tube every 12 hours      VITAL:  T(C): , Max: 37 (01-06-24 @ 21:06)  T(F): , Max: 98.6 (01-06-24 @ 21:06)  HR: 86 (01-07-24 @ 05:45)  BP: 180/82 (01-07-24 @ 05:45)  BP(mean): --  RR: 19 (01-07-24 @ 05:45)  SpO2: 95% (01-07-24 @ 05:45)  Wt(kg): --    01-06-24 @ 07:01  -  01-07-24 @ 07:00  --------------------------------------------------------  IN: 185 mL / OUT: 0 mL / NET: 185 mL        PHYSICAL EXAM:  General: NAD; Alert  HEENT:  NCAT; PERRLA, +NGT  Neck: No JVD; supple  Respiratory: b/l scattered rales   Cardiac: RRR s1s2  Gastrointestinal: BS+, soft, NT/ND  Urologic: No delong  Extremities: No peripheral edema  Access:     LABS:                          9.2    10.00 )-----------( 312      ( 07 Jan 2024 06:00 )             28.6     Na(137)/K(5.2)/Cl(109)/HCO3(19)/BUN(21)/Cr(1.35)Glu(301)/Ca(7.8)/Mg(2.20)/PO4(2.2)    01-07 @ 06:00  Na(143)/K(5.0)/Cl(111)/HCO3(20)/BUN(20)/Cr(1.49)Glu(195)/Ca(8.2)/Mg(1.90)/PO4(2.7)    01-06 @ 07:35  Na(142)/K(5.1)/Cl(111)/HCO3(19)/BUN(22)/Cr(1.45)Glu(219)/Ca(8.0)/Mg(1.90)/PO4(3.2)    01-05 @ 22:19  Na(140)/K(5.0)/Cl(111)/HCO3(16)/BUN(24)/Cr(1.41)Glu(199)/Ca(8.1)/Mg(1.90)/PO4(3.3)    01-05 @ 01:00    Urinalysis Basic - ( 07 Jan 2024 06:00 )    Color: x / Appearance: x / SG: x / pH: x  Gluc: 301 mg/dL / Ketone: x  / Bili: x / Urobili: x   Blood: x / Protein: x / Nitrite: x   Leuk Esterase: x / RBC: x / WBC x   Sq Epi: x / Non Sq Epi: x / Bacteria: x            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) who presents to the hospital for COVID19 infection and chest pain  MABLE ----improving   Hypophosphatemia - resolved   Hypertensive urgency -resolved --- BP meds as ordered     1 Renal - renal fxn stable , started on lasix  2 Lytes- controlled   3 Hypocalcemia -resolved   4 CV - BP elevated, on Lopressor 5 mg IV q6h, and amlodipine 5 mg daily  5 Vasc - s/p SMA stent placement;   6 Sx - s/p HIDA + for acute asa, medical management, on puree diet , encourage free water in take too (if allowed  with aspiration precautions)   7 GI - s/p EGD clipping of bleeding duodenal ulcers   8 Anemia- hgb stable           [facility]  Office: [phone]     on distress   on  puree  diet   family at bedside  acetaminophen   IVPB .. 1000 milliGRAM(s) IV Intermittent every 6 hours PRN  albuterol/ipratropium for Nebulization 3 milliLiter(s) Nebulizer every 12 hours  amLODIPine   Tablet 5 milliGRAM(s) Oral daily  aspirin  chewable 81 milliGRAM(s) Oral daily  dornase cierra Solution 2.5 milliGRAM(s) Inhalation every 12 hours  escitalopram 10 milliGRAM(s) Oral daily  gabapentin Solution 100 milliGRAM(s) Oral two times a day  heparin   Injectable 5000 Unit(s) SubCutaneous every 8 hours  insulin glargine Injectable (LANTUS) 14 Unit(s) SubCutaneous at bedtime  insulin lispro (ADMELOG) corrective regimen sliding scale   SubCutaneous every 6 hours  levETIRAcetam   Injectable 250 milliGRAM(s) IV Push every 12 hours  melatonin 3 milliGRAM(s) Oral at bedtime  memantine 5 milliGRAM(s) Oral two times a day  metoprolol tartrate Injectable 5 milliGRAM(s) IV Push every 6 hours  mometasone 110 MICROgram(s) Inhaler 2 Puff(s) Inhalation every 12 hours  pantoprazole  Injectable 40 milliGRAM(s) IV Push every 12 hours  ranolazine 500 milliGRAM(s) Oral two times a day  sodium chloride 0.45% with potassium chloride 20 mEq/L 1000 milliLiter(s) IV Continuous <Continuous>  sodium phosphate 30 milliMole(s)/500 mL IVPB 30 milliMole(s) IV Intermittent once  sucralfate 1 Gram(s) Oral every 12 hours  ticagrelor 60 milliGRAM(s) Enteral Tube every 12 hours      VITAL:  T(C): , Max: 37 (01-06-24 @ 21:06)  T(F): , Max: 98.6 (01-06-24 @ 21:06)  HR: 86 (01-07-24 @ 05:45)  BP: 180/82 (01-07-24 @ 05:45)  BP(mean): --  RR: 19 (01-07-24 @ 05:45)  SpO2: 95% (01-07-24 @ 05:45)  Wt(kg): --    01-06-24 @ 07:01  -  01-07-24 @ 07:00  --------------------------------------------------------  IN: 185 mL / OUT: 0 mL / NET: 185 mL        PHYSICAL EXAM:  General: NAD; Alert  HEENT:  NCAT; PERRLA, +NGT  Neck: No JVD; supple  Respiratory: b/l scattered rales   Cardiac: RRR s1s2  Gastrointestinal: BS+, soft, NT/ND  Urologic: No delong  Extremities: No peripheral edema  Access:     LABS:                          9.2    10.00 )-----------( 312      ( 07 Jan 2024 06:00 )             28.6     Na(137)/K(5.2)/Cl(109)/HCO3(19)/BUN(21)/Cr(1.35)Glu(301)/Ca(7.8)/Mg(2.20)/PO4(2.2)    01-07 @ 06:00  Na(143)/K(5.0)/Cl(111)/HCO3(20)/BUN(20)/Cr(1.49)Glu(195)/Ca(8.2)/Mg(1.90)/PO4(2.7)    01-06 @ 07:35  Na(142)/K(5.1)/Cl(111)/HCO3(19)/BUN(22)/Cr(1.45)Glu(219)/Ca(8.0)/Mg(1.90)/PO4(3.2)    01-05 @ 22:19  Na(140)/K(5.0)/Cl(111)/HCO3(16)/BUN(24)/Cr(1.41)Glu(199)/Ca(8.1)/Mg(1.90)/PO4(3.3)    01-05 @ 01:00    Urinalysis Basic - ( 07 Jan 2024 06:00 )    Color: x / Appearance: x / SG: x / pH: x  Gluc: 301 mg/dL / Ketone: x  / Bili: x / Urobili: x   Blood: x / Protein: x / Nitrite: x   Leuk Esterase: x / RBC: x / WBC x   Sq Epi: x / Non Sq Epi: x / Bacteria: x            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) who presents to the hospital for COVID19 infection and chest pain  MABLE ----improving   Hypophosphatemia - resolved   Hypertensive urgency -resolved --- BP meds as ordered     1 Renal - renal fxn stable , started on lasix  2 Lytes- controlled   3 Hypocalcemia -resolved   4 CV - BP elevated, on Lopressor 5 mg IV q6h, and amlodipine 5 mg daily  5 Vasc - s/p SMA stent placement;   6 Sx - s/p HIDA + for acute asa, medical management, on puree diet , encourage free water in take too (if allowed  with aspiration precautions)   7 GI - s/p EGD clipping of bleeding duodenal ulcers   8 Anemia- hgb stable           [facility]  Office: [phone]

## 2024-01-10 NOTE — PROGRESS NOTE ADULT - SUBJECTIVE AND OBJECTIVE BOX
Date of Service: 01-10-24 @ 13:20    Patient is a 89y old  Male who presents with a chief complaint of COVID19, Chest pain (10 Dmitri 2024 13:07)      Any change in ROS: seems to be doing better  less gurgling sound:       MEDICATIONS  (STANDING):  acetylcysteine 20%  Inhalation 4 milliLiter(s) Inhalation every 6 hours  albuterol/ipratropium for Nebulization 3 milliLiter(s) Nebulizer every 12 hours  amLODIPine   Tablet 5 milliGRAM(s) Oral daily  aspirin  chewable 81 milliGRAM(s) Oral daily  dornase cierra Solution 2.5 milliGRAM(s) Inhalation every 12 hours  escitalopram 10 milliGRAM(s) Oral daily  furosemide   Injectable 40 milliGRAM(s) IV Push daily  gabapentin Solution 100 milliGRAM(s) Oral two times a day  guaiFENesin ER 1200 milliGRAM(s) Oral every 12 hours  heparin   Injectable 5000 Unit(s) SubCutaneous every 8 hours  insulin glargine Injectable (LANTUS) 14 Unit(s) SubCutaneous at bedtime  insulin lispro (ADMELOG) corrective regimen sliding scale   SubCutaneous at bedtime  insulin lispro (ADMELOG) corrective regimen sliding scale   SubCutaneous three times a day before meals  levETIRAcetam   Injectable 250 milliGRAM(s) IV Push every 12 hours  melatonin 3 milliGRAM(s) Oral at bedtime  memantine 5 milliGRAM(s) Oral two times a day  metoprolol tartrate Injectable 5 milliGRAM(s) IV Push every 6 hours  mometasone 110 MICROgram(s) Inhaler 2 Puff(s) Inhalation every 12 hours  pantoprazole  Injectable 40 milliGRAM(s) IV Push every 12 hours  ranolazine 500 milliGRAM(s) Oral two times a day  sucralfate 1 Gram(s) Oral every 12 hours  ticagrelor 60 milliGRAM(s) Enteral Tube every 12 hours    MEDICATIONS  (PRN):  acetaminophen   IVPB .. 1000 milliGRAM(s) IV Intermittent every 6 hours PRN Mild Pain (1 - 3), Moderate Pain (4 - 6)  albuterol/ipratropium for Nebulization 3 milliLiter(s) Nebulizer every 6 hours PRN Shortness of Breath and/or Wheezing    Vital Signs Last 24 Hrs  T(C): 36.2 (10 Dmitri 2024 12:22), Max: 36.8 (09 Jan 2024 14:07)  T(F): 97.2 (10 Dmitri 2024 12:22), Max: 98.3 (09 Jan 2024 14:07)  HR: 86 (10 Dmitri 2024 12:22) (80 - 90)  BP: 123/57 (10 Dmitri 2024 12:22) (123/57 - 143/63)  BP(mean): --  RR: 18 (10 Dmitri 2024 12:22) (17 - 19)  SpO2: 98% (10 Dmitri 2024 12:22) (96% - 100%)    Parameters below as of 10 Dmitri 2024 12:22  Patient On (Oxygen Delivery Method): nasal cannula        I&O's Summary    09 Jan 2024 07:01  -  10 Dmitri 2024 07:00  --------------------------------------------------------  IN: 0 mL / OUT: 0 mL / NET: 0 mL          Physical Exam:   GENERAL: NAD, well-groomed, well-developed  HEENT: JAVAD/   Atraumatic, Normocephalic  ENMT: No tonsillar erythema, exudates, or enlargement; Moist mucous membranes, Good dentition, No lesions  NECK: Supple, No JVD, Normal thyroid  CHEST/LUNG: Clear to auscultaion- no wheezing  CVS: Regular rate and rhythm; No murmurs, rubs, or gallops  GI: : Soft, Nontender, Nondistended; Bowel sounds present  NERVOUS SYSTEM:  Alert & awake   EXTREMITIES: - edema  LYMPH: No lymphadenopathy noted  SKIN: No rashes or lesions  ENDOCRINOLOGY: No Thyromegaly  PSYCH: calm     Labs:                              8.7    7.26  )-----------( 274      ( 10 Dmitri 2024 06:40 )             27.4                         9.1    7.64  )-----------( 326      ( 09 Jan 2024 16:39 )             29.5                         8.0    6.48  )-----------( 284      ( 09 Jan 2024 08:45 )             25.7                         9.2    10.00 )-----------( 312      ( 07 Jan 2024 06:00 )             28.6     01-10    142  |  105  |  20  ----------------------------<  144<H>  4.5   |  26  |  1.39<H>  01-09    140  |  108<H>  |  21  ----------------------------<  146<H>  5.3   |  25  |  1.33<H>  01-07    137  |  109<H>  |  21  ----------------------------<  301<H>  5.2   |  19<L>  |  1.35<H>    Ca    8.4      10 Dmitri 2024 06:40  Ca    7.9<L>      09 Jan 2024 08:45  Phos  2.7     01-10  Phos  2.8     01-09  Mg     1.90     01-10  Mg     2.00     01-09    TPro  6.7  /  Alb  2.8<L>  /  TBili  0.5  /  DBili  x   /  AST  20  /  ALT  16  /  AlkPhos  73  01-10  TPro  6.0  /  Alb  2.8<L>  /  TBili  0.4  /  DBili  x   /  AST  16  /  ALT  18  /  AlkPhos  78  01-09    CAPILLARY BLOOD GLUCOSE      POCT Blood Glucose.: 86 mg/dL (10 Dmitri 2024 12:51)  POCT Blood Glucose.: 125 mg/dL (10 Dmitri 2024 09:38)  POCT Blood Glucose.: 219 mg/dL (09 Jan 2024 23:23)  POCT Blood Glucose.: 194 mg/dL (09 Jan 2024 21:12)  POCT Blood Glucose.: 121 mg/dL (09 Jan 2024 18:02)      LIVER FUNCTIONS - ( 10 Dmitri 2024 06:40 )  Alb: 2.8 g/dL / Pro: 6.7 g/dL / ALK PHOS: 73 U/L / ALT: 16 U/L / AST: 20 U/L / GGT: x             Urinalysis Basic - ( 10 Dmitri 2024 06:40 )    Color: x / Appearance: x / SG: x / pH: x  Gluc: 144 mg/dL / Ketone: x  / Bili: x / Urobili: x   Blood: x / Protein: x / Nitrite: x   Leuk Esterase: x / RBC: x / WBC x   Sq Epi: x / Non Sq Epi: x / Bacteria: x      rad< from: Xray Chest 1 View- PORTABLE-Urgent (Xray Chest 1 View- PORTABLE-Urgent .) (01.09.24 @ 14:53) >    ACC: [id number] EXAM:  XR CHEST PORTABLE URGENT 1V   ORDERED BY: EDWARD MARINO     PROCEDURE DATE:  01/09/2024          INTERPRETATION:  CLINICAL INDICATION: Abnormal Chest Sounds    TECHNIQUE: Single frontal, portable view of the chest was obtained.    COMPARISON: Chest x-ray 1/6/2024.    FINDINGS:  Left chest wall pacemaker. Cardiac stent. Enteric tube with tip in the   distal stomach/proximal duodenum. Sternotomy wires.  The heart size is normal.  Slight increase in size of moderate bilateral pleural effusions.  No pneumothorax.    IMPRESSION:  Slight increase in size of moderate bilateral pleural effusions.    --- End of Report ---          FABRICE DUMONT MD; Resident Radiologist  This document has been electronically signed.  AMELIA ANGLIN MD; Attending Radiologist  This document has been electronically signed. Jan 9 2024  3:18PM    < end of copied text >  < from: US Duplex Venous Lower Ext Complete, Bilateral (01.08.24 @ 13:36) >  COMPARISON: None available.    TECHNIQUE: Duplex sonography of the BILATERAL LOWER extremity veins with   color and spectral Doppler, with and without compression.    FINDINGS:    RIGHT:  Normal compressibility of the RIGHT common femoral, femoral and popliteal   veins.  Doppler examination shows normal spontaneous and phasic flow.  No RIGHT calf vein thrombosis is detected.    LEFT:  Normal compressibility of the LEFT common femoral, femoral and popliteal   veins.  Doppler examination shows normal spontaneous and phasic flow.  No LEFT calf vein thrombosis is detected.    IMPRESSION:  No evidence of deep venous thrombosis in either lower extremity.            --- End of Report ---            TRACY WEISS MD; Attending Radiologist  This document has been electronically signed. Jan 8 2024  1:38PM    < end of copied text >        RECENT CULTURES:        RESPIRATORY CULTURES:          Studies  Chest X-RAY  CT SCAN Chest   Venous Dopplers: LE:   CT Abdomen  Others               Date of Service: 01-10-24 @ 13:20    Patient is a 89y old  Male who presents with a chief complaint of COVID19, Chest pain (10 Dmitri 2024 13:07)      Any change in ROS: seems to be doing better  less gurgling sound:       MEDICATIONS  (STANDING):  acetylcysteine 20%  Inhalation 4 milliLiter(s) Inhalation every 6 hours  albuterol/ipratropium for Nebulization 3 milliLiter(s) Nebulizer every 12 hours  amLODIPine   Tablet 5 milliGRAM(s) Oral daily  aspirin  chewable 81 milliGRAM(s) Oral daily  dornase cirera Solution 2.5 milliGRAM(s) Inhalation every 12 hours  escitalopram 10 milliGRAM(s) Oral daily  furosemide   Injectable 40 milliGRAM(s) IV Push daily  gabapentin Solution 100 milliGRAM(s) Oral two times a day  guaiFENesin ER 1200 milliGRAM(s) Oral every 12 hours  heparin   Injectable 5000 Unit(s) SubCutaneous every 8 hours  insulin glargine Injectable (LANTUS) 14 Unit(s) SubCutaneous at bedtime  insulin lispro (ADMELOG) corrective regimen sliding scale   SubCutaneous at bedtime  insulin lispro (ADMELOG) corrective regimen sliding scale   SubCutaneous three times a day before meals  levETIRAcetam   Injectable 250 milliGRAM(s) IV Push every 12 hours  melatonin 3 milliGRAM(s) Oral at bedtime  memantine 5 milliGRAM(s) Oral two times a day  metoprolol tartrate Injectable 5 milliGRAM(s) IV Push every 6 hours  mometasone 110 MICROgram(s) Inhaler 2 Puff(s) Inhalation every 12 hours  pantoprazole  Injectable 40 milliGRAM(s) IV Push every 12 hours  ranolazine 500 milliGRAM(s) Oral two times a day  sucralfate 1 Gram(s) Oral every 12 hours  ticagrelor 60 milliGRAM(s) Enteral Tube every 12 hours    MEDICATIONS  (PRN):  acetaminophen   IVPB .. 1000 milliGRAM(s) IV Intermittent every 6 hours PRN Mild Pain (1 - 3), Moderate Pain (4 - 6)  albuterol/ipratropium for Nebulization 3 milliLiter(s) Nebulizer every 6 hours PRN Shortness of Breath and/or Wheezing    Vital Signs Last 24 Hrs  T(C): 36.2 (10 Dmitri 2024 12:22), Max: 36.8 (09 Jan 2024 14:07)  T(F): 97.2 (10 Dmitri 2024 12:22), Max: 98.3 (09 Jan 2024 14:07)  HR: 86 (10 Dmitri 2024 12:22) (80 - 90)  BP: 123/57 (10 Dmitri 2024 12:22) (123/57 - 143/63)  BP(mean): --  RR: 18 (10 Dmitri 2024 12:22) (17 - 19)  SpO2: 98% (10 Dmitri 2024 12:22) (96% - 100%)    Parameters below as of 10 Dmitri 2024 12:22  Patient On (Oxygen Delivery Method): nasal cannula        I&O's Summary    09 Jan 2024 07:01  -  10 Dmitri 2024 07:00  --------------------------------------------------------  IN: 0 mL / OUT: 0 mL / NET: 0 mL          Physical Exam:   GENERAL: NAD, well-groomed, well-developed  HEENT: JAVAD/   Atraumatic, Normocephalic  ENMT: No tonsillar erythema, exudates, or enlargement; Moist mucous membranes, Good dentition, No lesions  NECK: Supple, No JVD, Normal thyroid  CHEST/LUNG: Clear to auscultaion- no wheezing  CVS: Regular rate and rhythm; No murmurs, rubs, or gallops  GI: : Soft, Nontender, Nondistended; Bowel sounds present  NERVOUS SYSTEM:  Alert & awake   EXTREMITIES: - edema  LYMPH: No lymphadenopathy noted  SKIN: No rashes or lesions  ENDOCRINOLOGY: No Thyromegaly  PSYCH: calm     Labs:                              8.7    7.26  )-----------( 274      ( 10 Dmitri 2024 06:40 )             27.4                         9.1    7.64  )-----------( 326      ( 09 Jan 2024 16:39 )             29.5                         8.0    6.48  )-----------( 284      ( 09 Jan 2024 08:45 )             25.7                         9.2    10.00 )-----------( 312      ( 07 Jan 2024 06:00 )             28.6     01-10    142  |  105  |  20  ----------------------------<  144<H>  4.5   |  26  |  1.39<H>  01-09    140  |  108<H>  |  21  ----------------------------<  146<H>  5.3   |  25  |  1.33<H>  01-07    137  |  109<H>  |  21  ----------------------------<  301<H>  5.2   |  19<L>  |  1.35<H>    Ca    8.4      10 Dmitri 2024 06:40  Ca    7.9<L>      09 Jan 2024 08:45  Phos  2.7     01-10  Phos  2.8     01-09  Mg     1.90     01-10  Mg     2.00     01-09    TPro  6.7  /  Alb  2.8<L>  /  TBili  0.5  /  DBili  x   /  AST  20  /  ALT  16  /  AlkPhos  73  01-10  TPro  6.0  /  Alb  2.8<L>  /  TBili  0.4  /  DBili  x   /  AST  16  /  ALT  18  /  AlkPhos  78  01-09    CAPILLARY BLOOD GLUCOSE      POCT Blood Glucose.: 86 mg/dL (10 Dmitri 2024 12:51)  POCT Blood Glucose.: 125 mg/dL (10 Dmitri 2024 09:38)  POCT Blood Glucose.: 219 mg/dL (09 Jan 2024 23:23)  POCT Blood Glucose.: 194 mg/dL (09 Jan 2024 21:12)  POCT Blood Glucose.: 121 mg/dL (09 Jan 2024 18:02)      LIVER FUNCTIONS - ( 10 Dmitri 2024 06:40 )  Alb: 2.8 g/dL / Pro: 6.7 g/dL / ALK PHOS: 73 U/L / ALT: 16 U/L / AST: 20 U/L / GGT: x             Urinalysis Basic - ( 10 Dmitri 2024 06:40 )    Color: x / Appearance: x / SG: x / pH: x  Gluc: 144 mg/dL / Ketone: x  / Bili: x / Urobili: x   Blood: x / Protein: x / Nitrite: x   Leuk Esterase: x / RBC: x / WBC x   Sq Epi: x / Non Sq Epi: x / Bacteria: x      rad< from: Xray Chest 1 View- PORTABLE-Urgent (Xray Chest 1 View- PORTABLE-Urgent .) (01.09.24 @ 14:53) >    ACC: [id number] EXAM:  XR CHEST PORTABLE URGENT 1V   ORDERED BY: EDWARD MARINO     PROCEDURE DATE:  01/09/2024          INTERPRETATION:  CLINICAL INDICATION: Abnormal Chest Sounds    TECHNIQUE: Single frontal, portable view of the chest was obtained.    COMPARISON: Chest x-ray 1/6/2024.    FINDINGS:  Left chest wall pacemaker. Cardiac stent. Enteric tube with tip in the   distal stomach/proximal duodenum. Sternotomy wires.  The heart size is normal.  Slight increase in size of moderate bilateral pleural effusions.  No pneumothorax.    IMPRESSION:  Slight increase in size of moderate bilateral pleural effusions.    --- End of Report ---          FABRICE DUMONT MD; Resident Radiologist  This document has been electronically signed.  AMELIA ANGLIN MD; Attending Radiologist  This document has been electronically signed. Jan 9 2024  3:18PM    < end of copied text >  < from: US Duplex Venous Lower Ext Complete, Bilateral (01.08.24 @ 13:36) >  COMPARISON: None available.    TECHNIQUE: Duplex sonography of the BILATERAL LOWER extremity veins with   color and spectral Doppler, with and without compression.    FINDINGS:    RIGHT:  Normal compressibility of the RIGHT common femoral, femoral and popliteal   veins.  Doppler examination shows normal spontaneous and phasic flow.  No RIGHT calf vein thrombosis is detected.    LEFT:  Normal compressibility of the LEFT common femoral, femoral and popliteal   veins.  Doppler examination shows normal spontaneous and phasic flow.  No LEFT calf vein thrombosis is detected.    IMPRESSION:  No evidence of deep venous thrombosis in either lower extremity.            --- End of Report ---            TRACY WEISS MD; Attending Radiologist  This document has been electronically signed. Jan 8 2024  1:38PM    < end of copied text >        RECENT CULTURES:        RESPIRATORY CULTURES:          Studies  Chest X-RAY  CT SCAN Chest   Venous Dopplers: LE:   CT Abdomen  Others

## 2024-01-10 NOTE — PROGRESS NOTE ADULT - SUBJECTIVE AND OBJECTIVE BOX
Called to evaluate patient with difficulty swallowing secretions. Patient admit with Covid. Has swallow eval and was started on puree with thickened fluids. Denies any SOB, hoarseness. No other ENT related symptoms or complaints.     Mouth:   No Erythema, No exudates, no evidence of pooling secretions    Scope exam:  No Base of tongue edema,   + mucosal injection   Epiglottis sharp  Vocal cords mobile and patent.  No evidence of pooling secretions in hypopharynx.

## 2024-01-10 NOTE — PROGRESS NOTE ADULT - SUBJECTIVE AND OBJECTIVE BOX
SUBJECTIVE: No acute vascular events overnight.    Vital Signs Last 24 Hrs  T(C): 36.6 (09 Jan 2024 20:52), Max: 36.8 (09 Jan 2024 14:07)  T(F): 97.8 (09 Jan 2024 20:52), Max: 98.3 (09 Jan 2024 14:07)  HR: 80 (10 Dmitri 2024 01:00) (80 - 90)  BP: 136/59 (10 Dmitri 2024 01:00) (128/60 - 143/63)  BP(mean): --  RR: 18 (09 Jan 2024 20:52) (18 - 19)  SpO2: 100% (10 Dmitri 2024 00:15) (98% - 100%)    Parameters below as of 10 Dmitri 2024 04:22  Patient On (Oxygen Delivery Method): nasal cannula        I&O's Detail    09 Jan 2024 07:01  -  10 Dmitri 2024 07:00  --------------------------------------------------------  IN:  Total IN: 0 mL    OUT:    Glucerna 1.5: 0 mL    IV PiggyBack: 0 mL    Oral Fluid: 0 mL  Total OUT: 0 mL    Total NET: 0 mL          Physical Exam:  General Appearance: nad  Respiratory: NC, lots of secretions with mild wheezing.  Abd: soft, nontender  Extremities: No peripheral edema. Regular range of motion. Palpable DP on b/l LE.    LABS:                        9.1    7.64  )-----------( 326      ( 09 Jan 2024 16:39 )             29.5     01-09    140  |  108<H>  |  21  ----------------------------<  146<H>  5.3   |  25  |  1.33<H>    Ca    7.9<L>      09 Jan 2024 08:45  Phos  2.8     01-09  Mg     2.00     01-09    TPro  6.0  /  Alb  2.8<L>  /  TBili  0.4  /  DBili  x   /  AST  16  /  ALT  18  /  AlkPhos  78  01-09      Urinalysis Basic - ( 09 Jan 2024 08:45 )    Color: x / Appearance: x / SG: x / pH: x  Gluc: 146 mg/dL / Ketone: x  / Bili: x / Urobili: x   Blood: x / Protein: x / Nitrite: x   Leuk Esterase: x / RBC: x / WBC x   Sq Epi: x / Non Sq Epi: x / Bacteria: x        RADIOLOGY & ADDITIONAL STUDIES:

## 2024-01-10 NOTE — CHART NOTE - NSCHARTNOTEFT_GEN_A_CORE
[facility] venous duplex results reviewed as follows:     "1. No evidence of deep vein thrombosis in the left upper extremity.   2. Superficial vein thrombosis is visualized in the left cephalic vein at the antecubital fossa.   3. Complex, avascular fluid collection is noted at the upper arm measuring ~ 5.3 x 2.7 x 3.7 cm. Differential includes hematoma."    Discussed findings with vascular sx with recommendations as follows: Warm compresses and elevation. Patient already on antiplatelet therapy. No indication to excise vein unless puss draining from vein.    Ca Foy PA-C  Department of Medicine   In-house pager #[phone] [facility] venous duplex results reviewed as follows:     "1. No evidence of deep vein thrombosis in the left upper extremity.   2. Superficial vein thrombosis is visualized in the left cephalic vein at the antecubital fossa.   3. Complex, avascular fluid collection is noted at the upper arm measuring ~ 5.3 x 2.7 x 3.7 cm. Differential includes hematoma."    Discussed findings with vascular sx with recommendations as follows: Warm compresses and elevation. Patient already on antiplatelet therapy. No indication to excise vein unless puss draining from vein.    Ca Foy PA-C  Department of Medicine   In-house pager #[phone]

## 2024-01-10 NOTE — PROGRESS NOTE ADULT - ASSESSMENT
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 Patient states that he has been having a dry cough for the past week, worsened over the past few days. Denies SOB with the cough, denies hemoptysis. Reports fevers at home to 101.5 with associated diaphoresis. Said that he has significant pinprick like b/l chest pain with his cough but denies any chest pain when not coughing or with ambulation. Also reports rhinorrhea and sore throat. Reports generalized weakness and poor appetite. States his daughter was visiting him over the week end last week and she was positive for COVID19. Patient tested positive for COVID19 2 days prior and was started on paxlovid as outpatient. Of note, family states that the patient has had worsening confusion at home over the past 6 months (becoming disoriented to time) but recently has been more confused, talking about seeing people that are not present.   On arrival to the ED his vitals were T 98 -> 99.2, P 80, /72, RR 18, ) sat 100% RA. His lab work showed leukocytosis with neutrophilia and bandemia, MABLE, mild hyponatremia, indeterminant but stable troponins, and elevated lactate to 2.3. His COVID19 swab was positive. His CXR showed bibasilar crackles.  (23 Dec 2023 22:51):  pt has been here for past two weeks and now pulm called fro excessive secretions   he is able to answer simple questions:  grand sons at bedside:     Covid / Resp failure/on 2 L of oxygen  :  CADS/P CABG  DM  CKD  HTN  CVA X 3    Covid / Resp failure/on 2 L of oxygen:  -he was treated for covid before:   -he has excessive secretions  -keep o2 sao2 above 90%  -add BD and mucomyst: ;  -add mucinex:   1/10: he seems to be doing  ok: cont mucomyst and bd   CADS/P CABG  -cont current medications   DM  monitor and control   CKD  -cont current meds   HTN   -controlled  CVA X 3  -doing ok :     dw family

## 2024-01-10 NOTE — PROGRESS NOTE ADULT - ASSESSMENT
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 CTA demonstrating mesenteric fat stranding associated with ascending/transverse colon. S/p SMA angiography with stent placement with diagnostic laparoscopy 12/24, small bowel and visible colon viable, some inflammation of omentum in RUQ. Course c/b GI bleed, s/p scope on 1/2 with GI with clips placed. Patient transferred overnight from SICU to the floor in clinically stable condition.       Problem/Recommendation - 1:  ·  Problem: Type 2 diabetes mellitus with hyperglycemia.   ·  Recommendation: sugars better now.   Continue Lantus and SSI.     Problem/Recommendation - 2:  ·  Problem: Acute renal failure superimposed on chronic kidney disease.   ·  Recommendation: Creatinine improving      Problem/Recommendation - 3:  ·  Problem: CAD (coronary artery disease).   ·  Recommendation: S/P CABG and Stents. On DAPT and BB.  cardiology help appreciated.     Problem/Recommendation - 4:  ·  Problem: Essential hypertension.   ·  Recommendation: BP meds with hold parameters.     Problem/Recommendation - 5:  ·  Problem: GI bleed.   ·  Recommendation: On PPI.  S/P EGD.   Impression:          - NG tube removed before endoscopy                       - LA Grade B esophagitis.                       - Bleeding Dieulafoy's lesion of the posterior wall of                        the gastric body. This is likely the source of                        clinically significant bleeding. Hemostasis achieved                        using 2 MR conditional hemoclips.                       - Bleeding edematous mucosa and nonbleeding clean based                        gastric ulcers in the posterior wall of the gastric                        body. These are likely due to NG tube irritation.                        Hemostasis achieved using 1 MR conditional hemoclip.                       - Non-bleeding small clean based duodenal ulcer                       - No specimens collected.    < end of copied text >.     Problem/Recommendation - 6:  ·  Problem: Myoclonic jerking.   ·  Recommendation: On keppra.     Problem/Recommendation - 7:  ·  Problem: Dysphagia.   ·  Recommendation:  S/P  cineesophagogram r.  Puree diet       Problem/Recommendation - 8:  ·  Problem: Abdominal distension.   ·  Recommendation: S/P  SMA angiography with stent placement with diagnostic laparoscopy 12/24,  Vascular following.     Problem/Recommendation - 9:  ·  Problem: Anemia due to acute blood loss.   ·  Recommendation: S/P PRBC.   Will repeat hgb as low.      Problem/Recommendation - 10:  ·  Problem: Toxic metabolic encephalopathy.   ·  Recommendation: Mental status waxes and wanes .     Problem/Recommendation - 11:  ·  Problem: 2019 novel coronavirus disease (COVID-19).   ·  Recommendation: S/P Renmdisvir.     Problem/Recommendation - 12:  ·  Problem: Leg pain .   ·  Recommendation:  Doppler negative for  DVT.  On Gabapentin      D/W patient son in detail . D/W ACP .  Dispo : DC planning.

## 2024-01-10 NOTE — PROGRESS NOTE ADULT - SUBJECTIVE AND OBJECTIVE BOX
Aashish Boyer MD  Interventional Cardiology / Advance Heart Failure and Cardiac Transplant Specialist  [place] Office : [address] [place] [address]  Tel:   [place] Office : [address] [facility] [address]  Tel: [phone]      Subjective/Overnight events: Pt is lying in bed not in distress  	  MEDICATIONS:  amLODIPine   Tablet 5 milliGRAM(s) Oral daily  aspirin  chewable 81 milliGRAM(s) Oral daily  furosemide   Injectable 40 milliGRAM(s) IV Push daily  heparin   Injectable 5000 Unit(s) SubCutaneous every 8 hours  metoprolol tartrate Injectable 5 milliGRAM(s) IV Push every 6 hours  ranolazine 500 milliGRAM(s) Oral two times a day  ticagrelor 60 milliGRAM(s) Enteral Tube every 12 hours      acetylcysteine 20%  Inhalation 4 milliLiter(s) Inhalation every 6 hours  albuterol/ipratropium for Nebulization 3 milliLiter(s) Nebulizer every 12 hours  albuterol/ipratropium for Nebulization 3 milliLiter(s) Nebulizer every 6 hours PRN  dornase cierra Solution 2.5 milliGRAM(s) Inhalation every 12 hours  guaiFENesin ER 1200 milliGRAM(s) Oral every 12 hours  mometasone 110 MICROgram(s) Inhaler 2 Puff(s) Inhalation every 12 hours    acetaminophen   IVPB .. 1000 milliGRAM(s) IV Intermittent every 6 hours PRN  escitalopram 10 milliGRAM(s) Oral daily  gabapentin Solution 100 milliGRAM(s) Oral two times a day  levETIRAcetam   Injectable 250 milliGRAM(s) IV Push every 12 hours  melatonin 3 milliGRAM(s) Oral at bedtime  memantine 5 milliGRAM(s) Oral two times a day    pantoprazole  Injectable 40 milliGRAM(s) IV Push every 12 hours  sucralfate 1 Gram(s) Oral every 12 hours    insulin glargine Injectable (LANTUS) 14 Unit(s) SubCutaneous at bedtime  insulin lispro (ADMELOG) corrective regimen sliding scale   SubCutaneous at bedtime  insulin lispro (ADMELOG) corrective regimen sliding scale   SubCutaneous three times a day before meals        PAST MEDICAL/SURGICAL HISTORY  PAST MEDICAL & SURGICAL HISTORY:  Hyperlipemia      Hypertension      Coronary Artery Disease      Diabetes Mellitus Type II      Stented Coronary Artery  total 5 stents, last stent 5/2019      Neuropathy      Myocardial infarction      Stroke  mild left facial numbness   no other residuals verbalized      Myoclonic jerking      Stage 3 chronic kidney disease      History of Cataract Extraction      Hx of CABG      H/O coronary angiogram      S/P coronary artery stent placement  1/6/09      S/P placement of cardiac pacemaker          SOCIAL HISTORY: Substance Use (street drugs): ( x ) never used  (  ) other:    FAMILY HISTORY:  No pertinent family history in first degree relatives          PHYSICAL EXAM:  T(C): 36.2 (01-10-24 @ 12:22), Max: 36.8 (01-10-24 @ 05:53)  HR: 86 (01-10-24 @ 12:22) (80 - 90)  BP: 123/57 (01-10-24 @ 12:22) (123/57 - 143/63)  RR: 18 (01-10-24 @ 12:22) (17 - 19)  SpO2: 98% (01-10-24 @ 12:22) (96% - 100%)  Wt(kg): --  I&O's Summary    09 Jan 2024 07:01  -  10 Dmitri 2024 07:00  --------------------------------------------------------  IN: 0 mL / OUT: 0 mL / NET: 0 mL        GENERAL: NAD  EYES:  conjunctiva and sclera clear  ENMT: No tonsillar erythema, exudates, or enlargement  Cardiovascular: Normal S1 S2, No JVD, No murmurs, No edema  Respiratory: Lungs rhonchi to auscultation	  Gastrointestinal:  Soft  Extremities: anasarca                                    8.7    7.26  )-----------( 274      ( 10 Dmitri 2024 06:40 )             27.4     01-10    142  |  105  |  20  ----------------------------<  144<H>  4.5   |  26  |  1.39<H>    Ca    8.4      10 Dmitri 2024 06:40  Phos  2.7     01-10  Mg     1.90     01-10    TPro  6.7  /  Alb  2.8<L>  /  TBili  0.5  /  DBili  x   /  AST  20  /  ALT  16  /  AlkPhos  73  01-10    proBNP:   Lipid Profile:   HgA1c:   TSH:     Consultant(s) Notes Reviewed:  [x ] YES  [ ] NO    Care Discussed with Consultants/Other Providers [ x] YES  [ ] NO    Imaging Personally Reviewed independently:  [x] YES  [ ] NO    All labs, radiologic studies, vitals, orders and medications list reviewed. Patient is seen and examined at bedside. Case discussed with medical team.         Aashish Boyer MD  Interventional Cardiology / Advance Heart Failure and Cardiac Transplant Specialist  [place] Office : [address] [place] [address]  Tel:   [place] Office : [address] [facility] [address]  Tel: [phone]      Subjective/Overnight events: Pt is lying in bed not in distress  	  MEDICATIONS:  amLODIPine   Tablet 5 milliGRAM(s) Oral daily  aspirin  chewable 81 milliGRAM(s) Oral daily  furosemide   Injectable 40 milliGRAM(s) IV Push daily  heparin   Injectable 5000 Unit(s) SubCutaneous every 8 hours  metoprolol tartrate Injectable 5 milliGRAM(s) IV Push every 6 hours  ranolazine 500 milliGRAM(s) Oral two times a day  ticagrelor 60 milliGRAM(s) Enteral Tube every 12 hours      acetylcysteine 20%  Inhalation 4 milliLiter(s) Inhalation every 6 hours  albuterol/ipratropium for Nebulization 3 milliLiter(s) Nebulizer every 12 hours  albuterol/ipratropium for Nebulization 3 milliLiter(s) Nebulizer every 6 hours PRN  dornase cierra Solution 2.5 milliGRAM(s) Inhalation every 12 hours  guaiFENesin ER 1200 milliGRAM(s) Oral every 12 hours  mometasone 110 MICROgram(s) Inhaler 2 Puff(s) Inhalation every 12 hours    acetaminophen   IVPB .. 1000 milliGRAM(s) IV Intermittent every 6 hours PRN  escitalopram 10 milliGRAM(s) Oral daily  gabapentin Solution 100 milliGRAM(s) Oral two times a day  levETIRAcetam   Injectable 250 milliGRAM(s) IV Push every 12 hours  melatonin 3 milliGRAM(s) Oral at bedtime  memantine 5 milliGRAM(s) Oral two times a day    pantoprazole  Injectable 40 milliGRAM(s) IV Push every 12 hours  sucralfate 1 Gram(s) Oral every 12 hours    insulin glargine Injectable (LANTUS) 14 Unit(s) SubCutaneous at bedtime  insulin lispro (ADMELOG) corrective regimen sliding scale   SubCutaneous at bedtime  insulin lispro (ADMELOG) corrective regimen sliding scale   SubCutaneous three times a day before meals        PAST MEDICAL/SURGICAL HISTORY  PAST MEDICAL & SURGICAL HISTORY:  Hyperlipemia      Hypertension      Coronary Artery Disease      Diabetes Mellitus Type II      Stented Coronary Artery  total 5 stents, last stent 5/2019      Neuropathy      Myocardial infarction      Stroke  mild left facial numbness   no other residuals verbalized      Myoclonic jerking      Stage 3 chronic kidney disease      History of Cataract Extraction      Hx of CABG      H/O coronary angiogram      S/P coronary artery stent placement  1/6/09      S/P placement of cardiac pacemaker          SOCIAL HISTORY: Substance Use (street drugs): ( x ) never used  (  ) other:    FAMILY HISTORY:  No pertinent family history in first degree relatives          PHYSICAL EXAM:  T(C): 36.2 (01-10-24 @ 12:22), Max: 36.8 (01-10-24 @ 05:53)  HR: 86 (01-10-24 @ 12:22) (80 - 90)  BP: 123/57 (01-10-24 @ 12:22) (123/57 - 143/63)  RR: 18 (01-10-24 @ 12:22) (17 - 19)  SpO2: 98% (01-10-24 @ 12:22) (96% - 100%)  Wt(kg): --  I&O's Summary    09 Jan 2024 07:01  -  10 Dmitri 2024 07:00  --------------------------------------------------------  IN: 0 mL / OUT: 0 mL / NET: 0 mL        GENERAL: NAD  EYES:  conjunctiva and sclera clear  ENMT: No tonsillar erythema, exudates, or enlargement  Cardiovascular: Normal S1 S2, No JVD, No murmurs, No edema  Respiratory: Lungs rhonchi to auscultation	  Gastrointestinal:  Soft  Extremities: anasarca                                    8.7    7.26  )-----------( 274      ( 10 Dmitri 2024 06:40 )             27.4     01-10    142  |  105  |  20  ----------------------------<  144<H>  4.5   |  26  |  1.39<H>    Ca    8.4      10 Dmitri 2024 06:40  Phos  2.7     01-10  Mg     1.90     01-10    TPro  6.7  /  Alb  2.8<L>  /  TBili  0.5  /  DBili  x   /  AST  20  /  ALT  16  /  AlkPhos  73  01-10    proBNP:   Lipid Profile:   HgA1c:   TSH:     Consultant(s) Notes Reviewed:  [x ] YES  [ ] NO    Care Discussed with Consultants/Other Providers [ x] YES  [ ] NO    Imaging Personally Reviewed independently:  [x] YES  [ ] NO    All labs, radiologic studies, vitals, orders and medications list reviewed. Patient is seen and examined at bedside. Case discussed with medical team.

## 2024-01-10 NOTE — PROGRESS NOTE ADULT - SUBJECTIVE AND OBJECTIVE BOX
Date of Service  : 01-10-24     INTERVAL HPI/OVERNIGHT EVENTS: I feel better.   Vital Signs Last 24 Hrs  T(C): 36.8 (10 Dmitri 2024 05:53), Max: 36.8 (09 Jan 2024 14:07)  T(F): 98.2 (10 Dmitri 2024 05:53), Max: 98.3 (09 Jan 2024 14:07)  HR: 86 (10 Dmitri 2024 05:53) (80 - 90)  BP: 136/72 (10 Dmitri 2024 05:53) (128/60 - 143/63)  BP(mean): --  RR: 17 (10 Dmitri 2024 05:53) (17 - 19)  SpO2: 98% (10 Dmitri 2024 09:30) (96% - 100%)    Parameters below as of 10 Dmitri 2024 05:53  Patient On (Oxygen Delivery Method): nasal cannula  O2 Flow (L/min): 3    I&O's Summary    09 Jan 2024 07:01  -  10 Dmitri 2024 07:00  --------------------------------------------------------  IN: 0 mL / OUT: 0 mL / NET: 0 mL      MEDICATIONS  (STANDING):  acetylcysteine 20%  Inhalation 4 milliLiter(s) Inhalation every 6 hours  albuterol/ipratropium for Nebulization 3 milliLiter(s) Nebulizer every 12 hours  amLODIPine   Tablet 5 milliGRAM(s) Oral daily  aspirin  chewable 81 milliGRAM(s) Oral daily  dornase cierra Solution 2.5 milliGRAM(s) Inhalation every 12 hours  escitalopram 10 milliGRAM(s) Oral daily  furosemide   Injectable 40 milliGRAM(s) IV Push daily  gabapentin Solution 100 milliGRAM(s) Oral two times a day  guaiFENesin ER 1200 milliGRAM(s) Oral every 12 hours  heparin   Injectable 5000 Unit(s) SubCutaneous every 8 hours  insulin glargine Injectable (LANTUS) 14 Unit(s) SubCutaneous at bedtime  insulin lispro (ADMELOG) corrective regimen sliding scale   SubCutaneous at bedtime  insulin lispro (ADMELOG) corrective regimen sliding scale   SubCutaneous three times a day before meals  levETIRAcetam   Injectable 250 milliGRAM(s) IV Push every 12 hours  melatonin 3 milliGRAM(s) Oral at bedtime  memantine 5 milliGRAM(s) Oral two times a day  metoprolol tartrate Injectable 5 milliGRAM(s) IV Push every 6 hours  mometasone 110 MICROgram(s) Inhaler 2 Puff(s) Inhalation every 12 hours  pantoprazole  Injectable 40 milliGRAM(s) IV Push every 12 hours  ranolazine 500 milliGRAM(s) Oral two times a day  sucralfate 1 Gram(s) Oral every 12 hours  ticagrelor 60 milliGRAM(s) Enteral Tube every 12 hours    MEDICATIONS  (PRN):  acetaminophen   IVPB .. 1000 milliGRAM(s) IV Intermittent every 6 hours PRN Mild Pain (1 - 3), Moderate Pain (4 - 6)  albuterol/ipratropium for Nebulization 3 milliLiter(s) Nebulizer every 6 hours PRN Shortness of Breath and/or Wheezing    LABS:                        8.7    7.26  )-----------( 274      ( 10 Dmitri 2024 06:40 )             27.4     01-10    142  |  105  |  20  ----------------------------<  144<H>  4.5   |  26  |  1.39<H>    Ca    8.4      10 Dmitri 2024 06:40  Phos  2.7     01-10  Mg     1.90     01-10    TPro  6.7  /  Alb  2.8<L>  /  TBili  0.5  /  DBili  x   /  AST  20  /  ALT  16  /  AlkPhos  73  01-10      Urinalysis Basic - ( 10 Dmitri 2024 06:40 )    Color: x / Appearance: x / SG: x / pH: x  Gluc: 144 mg/dL / Ketone: x  / Bili: x / Urobili: x   Blood: x / Protein: x / Nitrite: x   Leuk Esterase: x / RBC: x / WBC x   Sq Epi: x / Non Sq Epi: x / Bacteria: x      CAPILLARY BLOOD GLUCOSE      POCT Blood Glucose.: 125 mg/dL (10 Dmitri 2024 09:38)  POCT Blood Glucose.: 219 mg/dL (09 Jan 2024 23:23)  POCT Blood Glucose.: 194 mg/dL (09 Jan 2024 21:12)  POCT Blood Glucose.: 121 mg/dL (09 Jan 2024 18:02)  POCT Blood Glucose.: 130 mg/dL (09 Jan 2024 12:32)        Urinalysis Basic - ( 10 Dmitri 2024 06:40 )    Color: x / Appearance: x / SG: x / pH: x  Gluc: 144 mg/dL / Ketone: x  / Bili: x / Urobili: x   Blood: x / Protein: x / Nitrite: x   Leuk Esterase: x / RBC: x / WBC x   Sq Epi: x / Non Sq Epi: x / Bacteria: x          Consultant(s) Notes Reviewed:  [x ] YES  [ ] NO    PHYSICAL EXAM:  GENERAL: Not in any distress ,  HEAD:  Atraumatic, Normocephalic  NECK: Supple, No JVD, Normal thyroid  NERVOUS SYSTEM:  Alert & Oriented X3, No focal deficit   CHEST/LUNG: Good air entry bilateral with no  rales, rhonchi, wheezing, or rubs  HEART: Regular rate and rhythm; No murmurs, rubs, or gallops  ABDOMEN: Soft, Nontender, Nondistended; Bowel sounds present  EXTREMITIES:   + edema    Care Discussed with Consultants/Other Providers [ x] YES  [ ] NO

## 2024-01-10 NOTE — PROGRESS NOTE ADULT - PROBLEM SELECTOR PLAN 1
1. ENT exam WNL, consider GI consult if patients symptoms do not improve  2. Follow SLP recs for diet.  3. No ENT intervention at this time.

## 2024-01-11 LAB
ALBUMIN SERPL ELPH-MCNC: 2.6 G/DL — LOW (ref 3.3–5)
ALBUMIN SERPL ELPH-MCNC: 2.6 G/DL — LOW (ref 3.3–5)
ALP SERPL-CCNC: 64 U/L — SIGNIFICANT CHANGE UP (ref 40–120)
ALP SERPL-CCNC: 64 U/L — SIGNIFICANT CHANGE UP (ref 40–120)
ALT FLD-CCNC: 16 U/L — SIGNIFICANT CHANGE UP (ref 4–41)
ALT FLD-CCNC: 16 U/L — SIGNIFICANT CHANGE UP (ref 4–41)
ANION GAP SERPL CALC-SCNC: 12 MMOL/L — SIGNIFICANT CHANGE UP (ref 7–14)
ANION GAP SERPL CALC-SCNC: 12 MMOL/L — SIGNIFICANT CHANGE UP (ref 7–14)
AST SERPL-CCNC: 18 U/L — SIGNIFICANT CHANGE UP (ref 4–40)
AST SERPL-CCNC: 18 U/L — SIGNIFICANT CHANGE UP (ref 4–40)
BILIRUB SERPL-MCNC: 0.6 MG/DL — SIGNIFICANT CHANGE UP (ref 0.2–1.2)
BILIRUB SERPL-MCNC: 0.6 MG/DL — SIGNIFICANT CHANGE UP (ref 0.2–1.2)
BUN SERPL-MCNC: 20 MG/DL — SIGNIFICANT CHANGE UP (ref 7–23)
BUN SERPL-MCNC: 20 MG/DL — SIGNIFICANT CHANGE UP (ref 7–23)
CALCIUM SERPL-MCNC: 8.2 MG/DL — LOW (ref 8.4–10.5)
CALCIUM SERPL-MCNC: 8.2 MG/DL — LOW (ref 8.4–10.5)
CHLORIDE SERPL-SCNC: 102 MMOL/L — SIGNIFICANT CHANGE UP (ref 98–107)
CHLORIDE SERPL-SCNC: 102 MMOL/L — SIGNIFICANT CHANGE UP (ref 98–107)
CO2 SERPL-SCNC: 26 MMOL/L — SIGNIFICANT CHANGE UP (ref 22–31)
CO2 SERPL-SCNC: 26 MMOL/L — SIGNIFICANT CHANGE UP (ref 22–31)
CREAT SERPL-MCNC: 1.47 MG/DL — HIGH (ref 0.5–1.3)
CREAT SERPL-MCNC: 1.47 MG/DL — HIGH (ref 0.5–1.3)
EGFR: 45 ML/MIN/1.73M2 — LOW
EGFR: 45 ML/MIN/1.73M2 — LOW
GLUCOSE BLDC GLUCOMTR-MCNC: 132 MG/DL — HIGH (ref 70–99)
GLUCOSE BLDC GLUCOMTR-MCNC: 132 MG/DL — HIGH (ref 70–99)
GLUCOSE BLDC GLUCOMTR-MCNC: 179 MG/DL — HIGH (ref 70–99)
GLUCOSE BLDC GLUCOMTR-MCNC: 179 MG/DL — HIGH (ref 70–99)
GLUCOSE BLDC GLUCOMTR-MCNC: 201 MG/DL — HIGH (ref 70–99)
GLUCOSE BLDC GLUCOMTR-MCNC: 201 MG/DL — HIGH (ref 70–99)
GLUCOSE BLDC GLUCOMTR-MCNC: 299 MG/DL — HIGH (ref 70–99)
GLUCOSE BLDC GLUCOMTR-MCNC: 299 MG/DL — HIGH (ref 70–99)
GLUCOSE SERPL-MCNC: 197 MG/DL — HIGH (ref 70–99)
GLUCOSE SERPL-MCNC: 197 MG/DL — HIGH (ref 70–99)
HCT VFR BLD CALC: 28.6 % — LOW (ref 39–50)
HCT VFR BLD CALC: 28.6 % — LOW (ref 39–50)
HGB BLD-MCNC: 9.3 G/DL — LOW (ref 13–17)
HGB BLD-MCNC: 9.3 G/DL — LOW (ref 13–17)
MAGNESIUM SERPL-MCNC: 1.7 MG/DL — SIGNIFICANT CHANGE UP (ref 1.6–2.6)
MAGNESIUM SERPL-MCNC: 1.7 MG/DL — SIGNIFICANT CHANGE UP (ref 1.6–2.6)
MCHC RBC-ENTMCNC: 30.8 PG — SIGNIFICANT CHANGE UP (ref 27–34)
MCHC RBC-ENTMCNC: 30.8 PG — SIGNIFICANT CHANGE UP (ref 27–34)
MCHC RBC-ENTMCNC: 32.5 GM/DL — SIGNIFICANT CHANGE UP (ref 32–36)
MCHC RBC-ENTMCNC: 32.5 GM/DL — SIGNIFICANT CHANGE UP (ref 32–36)
MCV RBC AUTO: 94.7 FL — SIGNIFICANT CHANGE UP (ref 80–100)
MCV RBC AUTO: 94.7 FL — SIGNIFICANT CHANGE UP (ref 80–100)
NRBC # BLD: 0 /100 WBCS — SIGNIFICANT CHANGE UP (ref 0–0)
NRBC # BLD: 0 /100 WBCS — SIGNIFICANT CHANGE UP (ref 0–0)
NRBC # FLD: 0 K/UL — SIGNIFICANT CHANGE UP (ref 0–0)
NRBC # FLD: 0 K/UL — SIGNIFICANT CHANGE UP (ref 0–0)
PHOSPHATE SERPL-MCNC: 2.7 MG/DL — SIGNIFICANT CHANGE UP (ref 2.5–4.5)
PHOSPHATE SERPL-MCNC: 2.7 MG/DL — SIGNIFICANT CHANGE UP (ref 2.5–4.5)
PLATELET # BLD AUTO: 299 K/UL — SIGNIFICANT CHANGE UP (ref 150–400)
PLATELET # BLD AUTO: 299 K/UL — SIGNIFICANT CHANGE UP (ref 150–400)
POTASSIUM SERPL-MCNC: 4.3 MMOL/L — SIGNIFICANT CHANGE UP (ref 3.5–5.3)
POTASSIUM SERPL-MCNC: 4.3 MMOL/L — SIGNIFICANT CHANGE UP (ref 3.5–5.3)
POTASSIUM SERPL-SCNC: 4.3 MMOL/L — SIGNIFICANT CHANGE UP (ref 3.5–5.3)
POTASSIUM SERPL-SCNC: 4.3 MMOL/L — SIGNIFICANT CHANGE UP (ref 3.5–5.3)
PROT SERPL-MCNC: 6.2 G/DL — SIGNIFICANT CHANGE UP (ref 6–8.3)
PROT SERPL-MCNC: 6.2 G/DL — SIGNIFICANT CHANGE UP (ref 6–8.3)
RBC # BLD: 3.02 M/UL — LOW (ref 4.2–5.8)
RBC # BLD: 3.02 M/UL — LOW (ref 4.2–5.8)
RBC # FLD: 19.6 % — HIGH (ref 10.3–14.5)
RBC # FLD: 19.6 % — HIGH (ref 10.3–14.5)
SODIUM SERPL-SCNC: 140 MMOL/L — SIGNIFICANT CHANGE UP (ref 135–145)
SODIUM SERPL-SCNC: 140 MMOL/L — SIGNIFICANT CHANGE UP (ref 135–145)
WBC # BLD: 7.51 K/UL — SIGNIFICANT CHANGE UP (ref 3.8–10.5)
WBC # BLD: 7.51 K/UL — SIGNIFICANT CHANGE UP (ref 3.8–10.5)
WBC # FLD AUTO: 7.51 K/UL — SIGNIFICANT CHANGE UP (ref 3.8–10.5)
WBC # FLD AUTO: 7.51 K/UL — SIGNIFICANT CHANGE UP (ref 3.8–10.5)

## 2024-01-11 RX ORDER — LANOLIN ALCOHOL/MO/W.PET/CERES
3 CREAM (GRAM) TOPICAL ONCE
Refills: 0 | Status: COMPLETED | OUTPATIENT
Start: 2024-01-11 | End: 2024-01-11

## 2024-01-11 RX ADMIN — Medication 5 MILLIGRAM(S): at 00:59

## 2024-01-11 RX ADMIN — Medication 1 GRAM(S): at 06:28

## 2024-01-11 RX ADMIN — Medication 100 MILLIGRAM(S): at 12:41

## 2024-01-11 RX ADMIN — Medication 100 MILLIGRAM(S): at 13:01

## 2024-01-11 RX ADMIN — Medication 81 MILLIGRAM(S): at 12:43

## 2024-01-11 RX ADMIN — TICAGRELOR 60 MILLIGRAM(S): 90 TABLET ORAL at 22:06

## 2024-01-11 RX ADMIN — Medication 1 GRAM(S): at 18:49

## 2024-01-11 RX ADMIN — Medication 3 MILLIGRAM(S): at 02:59

## 2024-01-11 RX ADMIN — HEPARIN SODIUM 5000 UNIT(S): 5000 INJECTION INTRAVENOUS; SUBCUTANEOUS at 06:30

## 2024-01-11 RX ADMIN — DORNASE ALFA 2.5 MILLIGRAM(S): 1 SOLUTION RESPIRATORY (INHALATION) at 08:48

## 2024-01-11 RX ADMIN — HEPARIN SODIUM 5000 UNIT(S): 5000 INJECTION INTRAVENOUS; SUBCUTANEOUS at 16:25

## 2024-01-11 RX ADMIN — Medication 100 MILLIGRAM(S): at 18:41

## 2024-01-11 RX ADMIN — MOMETASONE FUROATE 2 PUFF(S): 220 INHALANT RESPIRATORY (INHALATION) at 09:43

## 2024-01-11 RX ADMIN — Medication 4 MILLILITER(S): at 04:36

## 2024-01-11 RX ADMIN — MEMANTINE HYDROCHLORIDE 5 MILLIGRAM(S): 10 TABLET ORAL at 06:28

## 2024-01-11 RX ADMIN — Medication 3 MILLILITER(S): at 08:47

## 2024-01-11 RX ADMIN — Medication 5 MILLIGRAM(S): at 06:26

## 2024-01-11 RX ADMIN — GABAPENTIN 100 MILLIGRAM(S): 400 CAPSULE ORAL at 06:33

## 2024-01-11 RX ADMIN — PANTOPRAZOLE SODIUM 40 MILLIGRAM(S): 20 TABLET, DELAYED RELEASE ORAL at 06:33

## 2024-01-11 RX ADMIN — MOMETASONE FUROATE 2 PUFF(S): 220 INHALANT RESPIRATORY (INHALATION) at 22:45

## 2024-01-11 RX ADMIN — TICAGRELOR 60 MILLIGRAM(S): 90 TABLET ORAL at 12:43

## 2024-01-11 RX ADMIN — Medication 5 MILLIGRAM(S): at 18:42

## 2024-01-11 RX ADMIN — Medication 3 MILLILITER(S): at 15:25

## 2024-01-11 RX ADMIN — PANTOPRAZOLE SODIUM 40 MILLIGRAM(S): 20 TABLET, DELAYED RELEASE ORAL at 18:41

## 2024-01-11 RX ADMIN — Medication 1200 MILLIGRAM(S): at 04:09

## 2024-01-11 RX ADMIN — Medication 5 MILLIGRAM(S): at 12:44

## 2024-01-11 RX ADMIN — Medication 40 MILLIGRAM(S): at 06:29

## 2024-01-11 RX ADMIN — HEPARIN SODIUM 5000 UNIT(S): 5000 INJECTION INTRAVENOUS; SUBCUTANEOUS at 22:06

## 2024-01-11 RX ADMIN — Medication 3 MILLILITER(S): at 19:56

## 2024-01-11 RX ADMIN — MEMANTINE HYDROCHLORIDE 5 MILLIGRAM(S): 10 TABLET ORAL at 18:42

## 2024-01-11 RX ADMIN — AMLODIPINE BESYLATE 5 MILLIGRAM(S): 2.5 TABLET ORAL at 06:29

## 2024-01-11 RX ADMIN — RANOLAZINE 500 MILLIGRAM(S): 500 TABLET, FILM COATED, EXTENDED RELEASE ORAL at 18:43

## 2024-01-11 RX ADMIN — Medication 4 MILLILITER(S): at 08:48

## 2024-01-11 RX ADMIN — Medication 4 MILLILITER(S): at 15:25

## 2024-01-11 RX ADMIN — Medication 6: at 09:41

## 2024-01-11 RX ADMIN — Medication 4 MILLILITER(S): at 19:57

## 2024-01-11 RX ADMIN — ESCITALOPRAM OXALATE 10 MILLIGRAM(S): 10 TABLET, FILM COATED ORAL at 12:44

## 2024-01-11 RX ADMIN — MOMETASONE FUROATE 2 PUFF(S): 220 INHALANT RESPIRATORY (INHALATION) at 01:01

## 2024-01-11 RX ADMIN — LEVETIRACETAM 250 MILLIGRAM(S): 250 TABLET, FILM COATED ORAL at 06:24

## 2024-01-11 RX ADMIN — Medication 100 MILLIGRAM(S): at 22:09

## 2024-01-11 RX ADMIN — INSULIN GLARGINE 14 UNIT(S): 100 INJECTION, SOLUTION SUBCUTANEOUS at 22:06

## 2024-01-11 RX ADMIN — Medication 4: at 12:44

## 2024-01-11 RX ADMIN — Medication 3 MILLIGRAM(S): at 22:06

## 2024-01-11 RX ADMIN — RANOLAZINE 500 MILLIGRAM(S): 500 TABLET, FILM COATED, EXTENDED RELEASE ORAL at 06:29

## 2024-01-11 RX ADMIN — LEVETIRACETAM 250 MILLIGRAM(S): 250 TABLET, FILM COATED ORAL at 18:44

## 2024-01-11 RX ADMIN — GABAPENTIN 100 MILLIGRAM(S): 400 CAPSULE ORAL at 18:42

## 2024-01-11 NOTE — PROGRESS NOTE ADULT - ASSESSMENT
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 CTA demonstrating mesenteric fat stranding associated with ascending/transverse colon. S/p SMA angiography with stent placement with diagnostic laparoscopy 12/24, small bowel and visible colon viable, some inflammation of omentum in RUQ. Course c/b GI bleed, s/p scope on 1/2 with GI with clips placed. Patient transferred overnight from SICU to the floor in clinically stable condition.       Problem/Recommendation - 1:  ·  Problem: Type 2 diabetes mellitus with hyperglycemia.   ·  Recommendation: sugars better now.   Continue Lantus and SSI.     Problem/Recommendation - 2:  ·  Problem: Acute renal failure superimposed on chronic kidney disease.   ·  Recommendation: Creatinine improving      Problem/Recommendation - 3:  ·  Problem: CAD (coronary artery disease).   ·  Recommendation: S/P CABG and Stents. On DAPT and BB.  cardiology help appreciated.     Problem/Recommendation - 4:  ·  Problem: Essential hypertension.   ·  Recommendation: BP meds with hold parameters.     Problem/Recommendation - 5:  ·  Problem: GI bleed.   ·  Recommendation: On PPI.  S/P EGD.   Impression:          - NG tube removed before endoscopy                       - LA Grade B esophagitis.                       - Bleeding Dieulafoy's lesion of the posterior wall of                        the gastric body. This is likely the source of                        clinically significant bleeding. Hemostasis achieved                        using 2 MR conditional hemoclips.                       - Bleeding edematous mucosa and nonbleeding clean based                        gastric ulcers in the posterior wall of the gastric                        body. These are likely due to NG tube irritation.                        Hemostasis achieved using 1 MR conditional hemoclip.                       - Non-bleeding small clean based duodenal ulcer                       - No specimens collected.    < end of copied text >.     Problem/Recommendation - 6:  ·  Problem: Myoclonic jerking.   ·  Recommendation: On keppra.     Problem/Recommendation - 7:  ·  Problem: Dysphagia.   ·  Recommendation:  S/P  cineesophagogram r.  Puree diet     Problem/Recommendation - 8:  ·  Problem: Abdominal distension.   ·  Recommendation: S/P  SMA angiography with stent placement with diagnostic laparoscopy 12/24,  Vascular following.     Problem/Recommendation - 9:  ·  Problem: Anemia due to acute blood loss.   ·  Recommendation: S/P PRBC.   HH stable.       Problem/Recommendation - 10:  ·  Problem: Toxic metabolic encephalopathy.   ·  Recommendation: Mental status waxes and wanes .     Problem/Recommendation - 11:  ·  Problem: 2019 novel coronavirus disease (COVID-19).   ·  Recommendation: S/P Renmdisvir.     Problem/Recommendation - 12:  ·  Problem: Leg pain  with edema    ·  Recommendation:  Diuretics,  Doppler negative for  DVT.  On Gabapentin     Problem/Recommendation - 13:  ·  Problem: Persistent Cough.   ·  Recommendation: ? Aspiration.   Cough suppressants .  Pulmonary helping         D/W patient son in detail . D/W ACP .  Dispo : DC planning to [facility].      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 CTA demonstrating mesenteric fat stranding associated with ascending/transverse colon. S/p SMA angiography with stent placement with diagnostic laparoscopy 12/24, small bowel and visible colon viable, some inflammation of omentum in RUQ. Course c/b GI bleed, s/p scope on 1/2 with GI with clips placed. Patient transferred overnight from SICU to the floor in clinically stable condition.       Problem/Recommendation - 1:  ·  Problem: Type 2 diabetes mellitus with hyperglycemia.   ·  Recommendation: sugars better now.   Continue Lantus and SSI.     Problem/Recommendation - 2:  ·  Problem: Acute renal failure superimposed on chronic kidney disease.   ·  Recommendation: Creatinine improving      Problem/Recommendation - 3:  ·  Problem: CAD (coronary artery disease).   ·  Recommendation: S/P CABG and Stents. On DAPT and BB.  cardiology help appreciated.     Problem/Recommendation - 4:  ·  Problem: Essential hypertension.   ·  Recommendation: BP meds with hold parameters.     Problem/Recommendation - 5:  ·  Problem: GI bleed.   ·  Recommendation: On PPI.  S/P EGD.   Impression:          - NG tube removed before endoscopy                       - LA Grade B esophagitis.                       - Bleeding Dieulafoy's lesion of the posterior wall of                        the gastric body. This is likely the source of                        clinically significant bleeding. Hemostasis achieved                        using 2 MR conditional hemoclips.                       - Bleeding edematous mucosa and nonbleeding clean based                        gastric ulcers in the posterior wall of the gastric                        body. These are likely due to NG tube irritation.                        Hemostasis achieved using 1 MR conditional hemoclip.                       - Non-bleeding small clean based duodenal ulcer                       - No specimens collected.    < end of copied text >.     Problem/Recommendation - 6:  ·  Problem: Myoclonic jerking.   ·  Recommendation: On keppra.     Problem/Recommendation - 7:  ·  Problem: Dysphagia.   ·  Recommendation:  S/P  cineesophagogram r.  Puree diet     Problem/Recommendation - 8:  ·  Problem: Abdominal distension.   ·  Recommendation: S/P  SMA angiography with stent placement with diagnostic laparoscopy 12/24,  Vascular following.     Problem/Recommendation - 9:  ·  Problem: Anemia due to acute blood loss.   ·  Recommendation: S/P PRBC.   HH stable.       Problem/Recommendation - 10:  ·  Problem: Toxic metabolic encephalopathy.   ·  Recommendation: Mental status waxes and wanes .     Problem/Recommendation - 11:  ·  Problem: 2019 novel coronavirus disease (COVID-19).   ·  Recommendation: S/P Renmdisvir.     Problem/Recommendation - 12:  ·  Problem: Leg pain  with edema    ·  Recommendation:  Diuretics,  Doppler negative for  DVT.  On Gabapentin     Problem/Recommendation - 13:  ·  Problem: Persistent Cough.   ·  Recommendation: ? Aspiration.   Cough suppressants .  Pulmonary helping         D/W patient son in detail . D/W ACP .  Dispo : DC planning to [facility].

## 2024-01-11 NOTE — PROGRESS NOTE ADULT - ASSESSMENT
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 CTA demonstrating mesenteric fat stranding associated with ascending/transverse colon. S/p SMA angiography with stent placement with diagnostic laparoscopy 12/24, small bowel and visible colon viable, some inflammation of omentum in RUQ. Course c/b GI bleed, s/p scope on 1/2 with GI with clips placed.     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 CTA demonstrating mesenteric fat stranding associated with ascending/transverse colon. S/p SMA angiography with stent placement with diagnostic laparoscopy 12/24, small bowel and visible colon viable, some inflammation of omentum in RUQ. Course c/b GI bleed, s/p scope on 1/2 with GI with clips placed.     Plan:   - Continue warm compressions  - Pain control PRN  - ASA, Brillinta, SQH  - Rest of care per medical team    C Team Surgery   [id number]

## 2024-01-11 NOTE — PROGRESS NOTE ADULT - SUBJECTIVE AND OBJECTIVE BOX
Aashish Boyer MD  Interventional Cardiology / Advance Heart Failure and Cardiac Transplant Specialist  [place] Office : [address] [place] [address] [place] [address]  Tel:   [place] Office : [address] [facility] [place] [address]  Tel: [phone]       Pt is lying in bed comfortable not in distress, no chest pains no SOB no palpitations  	  MEDICATIONS:  amLODIPine   Tablet 5 milliGRAM(s) Oral daily  aspirin  chewable 81 milliGRAM(s) Oral daily  furosemide   Injectable 40 milliGRAM(s) IV Push daily  heparin   Injectable 5000 Unit(s) SubCutaneous every 8 hours  metoprolol tartrate Injectable 5 milliGRAM(s) IV Push every 6 hours  ranolazine 500 milliGRAM(s) Oral two times a day  ticagrelor 60 milliGRAM(s) Enteral Tube every 12 hours      acetylcysteine 20%  Inhalation 4 milliLiter(s) Inhalation every 6 hours  albuterol/ipratropium for Nebulization 3 milliLiter(s) Nebulizer every 6 hours PRN  albuterol/ipratropium for Nebulization 3 milliLiter(s) Nebulizer every 12 hours  benzonatate 100 milliGRAM(s) Oral every 8 hours PRN  benzonatate 100 milliGRAM(s) Oral every 8 hours  guaiFENesin ER 1200 milliGRAM(s) Oral every 12 hours  guaiFENesin Oral Liquid (Sugar-Free) 100 milliGRAM(s) Oral every 6 hours PRN  guaiFENesin Oral Liquid (Sugar-Free) 200 milliGRAM(s) Oral every 6 hours  mometasone 110 MICROgram(s) Inhaler 2 Puff(s) Inhalation every 12 hours    acetaminophen   IVPB .. 1000 milliGRAM(s) IV Intermittent every 6 hours PRN  escitalopram 10 milliGRAM(s) Oral daily  gabapentin Solution 100 milliGRAM(s) Oral two times a day  levETIRAcetam   Injectable 250 milliGRAM(s) IV Push every 12 hours  melatonin 3 milliGRAM(s) Oral at bedtime  memantine 5 milliGRAM(s) Oral two times a day    pantoprazole  Injectable 40 milliGRAM(s) IV Push every 12 hours  sucralfate 1 Gram(s) Oral every 12 hours    insulin glargine Injectable (LANTUS) 14 Unit(s) SubCutaneous at bedtime  insulin lispro (ADMELOG) corrective regimen sliding scale   SubCutaneous at bedtime  insulin lispro (ADMELOG) corrective regimen sliding scale   SubCutaneous three times a day before meals        PAST MEDICAL/SURGICAL HISTORY  PAST MEDICAL & SURGICAL HISTORY:  Hyperlipemia      Hypertension      Coronary Artery Disease      Diabetes Mellitus Type II      Stented Coronary Artery  total 5 stents, last stent 5/2019      Neuropathy      Myocardial infarction      Stroke  mild left facial numbness   no other residuals verbalized      Myoclonic jerking      Stage 3 chronic kidney disease      History of Cataract Extraction      Hx of CABG      H/O coronary angiogram      S/P coronary artery stent placement  1/6/09      S/P placement of cardiac pacemaker          SOCIAL HISTORY: Substance Use (street drugs): ( x ) never used  (  ) other:    FAMILY HISTORY:  No pertinent family history in first degree relatives         PHYSICAL EXAM:  T(C): 36.4 (01-11-24 @ 17:58), Max: 36.9 (01-10-24 @ 21:35)  HR: 86 (01-11-24 @ 17:58) (77 - 86)  BP: 143/55 (01-11-24 @ 17:58) (114/47 - 145/54)  RR: 16 (01-11-24 @ 17:58) (16 - 18)  SpO2: 97% (01-11-24 @ 17:58) (95% - 99%)  Wt(kg): --  I&O's Summary    11 Jan 2024 07:01  -  11 Jan 2024 20:42  --------------------------------------------------------  IN: 0 mL / OUT: 1000 mL / NET: -1000 mL        EYES:   PERRLA   ENMT:   Moist mucous membranes, Good dentition, No lesions  Cardiovascular: Normal S1 S2, No JVD, No murmurs, No edema  Respiratory: b/l rhonchi   Gastrointestinal:  Soft, Non-tender, + BS	  Extremities: 2+ edema, left arm hematoma                                    9.3    7.51  )-----------( 299      ( 11 Jan 2024 06:10 )             28.6     01-11    140  |  102  |  20  ----------------------------<  197<H>  4.3   |  26  |  1.47<H>    Ca    8.2<L>      11 Jan 2024 06:10  Phos  2.7     01-11  Mg     1.70     01-11    TPro  6.2  /  Alb  2.6<L>  /  TBili  0.6  /  DBili  x   /  AST  18  /  ALT  16  /  AlkPhos  64  01-11    proBNP:   Lipid Profile:   HgA1c:   TSH:     Consultant(s) Notes Reviewed:  [x ] YES  [ ] NO    Care Discussed with Consultants/Other Providers [ x] YES  [ ] NO    Imaging Personally Reviewed independently:  [x] YES  [ ] NO    All labs, radiologic studies, vitals, orders and medications list reviewed. Patient is seen and examined at bedside. Case discussed with medical team.         Aashish Boyer MD  Interventional Cardiology / Advance Heart Failure and Cardiac Transplant Specialist  [place] Office : [address] [place] [address] [place] [address]  Tel:   [place] Office : [address] [facility] [place] [address]  Tel: [phone]       Pt is lying in bed comfortable not in distress, no chest pains no SOB no palpitations  	  MEDICATIONS:  amLODIPine   Tablet 5 milliGRAM(s) Oral daily  aspirin  chewable 81 milliGRAM(s) Oral daily  furosemide   Injectable 40 milliGRAM(s) IV Push daily  heparin   Injectable 5000 Unit(s) SubCutaneous every 8 hours  metoprolol tartrate Injectable 5 milliGRAM(s) IV Push every 6 hours  ranolazine 500 milliGRAM(s) Oral two times a day  ticagrelor 60 milliGRAM(s) Enteral Tube every 12 hours      acetylcysteine 20%  Inhalation 4 milliLiter(s) Inhalation every 6 hours  albuterol/ipratropium for Nebulization 3 milliLiter(s) Nebulizer every 6 hours PRN  albuterol/ipratropium for Nebulization 3 milliLiter(s) Nebulizer every 12 hours  benzonatate 100 milliGRAM(s) Oral every 8 hours PRN  benzonatate 100 milliGRAM(s) Oral every 8 hours  guaiFENesin ER 1200 milliGRAM(s) Oral every 12 hours  guaiFENesin Oral Liquid (Sugar-Free) 100 milliGRAM(s) Oral every 6 hours PRN  guaiFENesin Oral Liquid (Sugar-Free) 200 milliGRAM(s) Oral every 6 hours  mometasone 110 MICROgram(s) Inhaler 2 Puff(s) Inhalation every 12 hours    acetaminophen   IVPB .. 1000 milliGRAM(s) IV Intermittent every 6 hours PRN  escitalopram 10 milliGRAM(s) Oral daily  gabapentin Solution 100 milliGRAM(s) Oral two times a day  levETIRAcetam   Injectable 250 milliGRAM(s) IV Push every 12 hours  melatonin 3 milliGRAM(s) Oral at bedtime  memantine 5 milliGRAM(s) Oral two times a day    pantoprazole  Injectable 40 milliGRAM(s) IV Push every 12 hours  sucralfate 1 Gram(s) Oral every 12 hours    insulin glargine Injectable (LANTUS) 14 Unit(s) SubCutaneous at bedtime  insulin lispro (ADMELOG) corrective regimen sliding scale   SubCutaneous at bedtime  insulin lispro (ADMELOG) corrective regimen sliding scale   SubCutaneous three times a day before meals        PAST MEDICAL/SURGICAL HISTORY  PAST MEDICAL & SURGICAL HISTORY:  Hyperlipemia      Hypertension      Coronary Artery Disease      Diabetes Mellitus Type II      Stented Coronary Artery  total 5 stents, last stent 5/2019      Neuropathy      Myocardial infarction      Stroke  mild left facial numbness   no other residuals verbalized      Myoclonic jerking      Stage 3 chronic kidney disease      History of Cataract Extraction      Hx of CABG      H/O coronary angiogram      S/P coronary artery stent placement  1/6/09      S/P placement of cardiac pacemaker          SOCIAL HISTORY: Substance Use (street drugs): ( x ) never used  (  ) other:    FAMILY HISTORY:  No pertinent family history in first degree relatives         PHYSICAL EXAM:  T(C): 36.4 (01-11-24 @ 17:58), Max: 36.9 (01-10-24 @ 21:35)  HR: 86 (01-11-24 @ 17:58) (77 - 86)  BP: 143/55 (01-11-24 @ 17:58) (114/47 - 145/54)  RR: 16 (01-11-24 @ 17:58) (16 - 18)  SpO2: 97% (01-11-24 @ 17:58) (95% - 99%)  Wt(kg): --  I&O's Summary    11 Jan 2024 07:01  -  11 Jan 2024 20:42  --------------------------------------------------------  IN: 0 mL / OUT: 1000 mL / NET: -1000 mL        EYES:   PERRLA   ENMT:   Moist mucous membranes, Good dentition, No lesions  Cardiovascular: Normal S1 S2, No JVD, No murmurs, No edema  Respiratory: b/l rhonchi   Gastrointestinal:  Soft, Non-tender, + BS	  Extremities: 2+ edema, left arm hematoma                                    9.3    7.51  )-----------( 299      ( 11 Jan 2024 06:10 )             28.6     01-11    140  |  102  |  20  ----------------------------<  197<H>  4.3   |  26  |  1.47<H>    Ca    8.2<L>      11 Jan 2024 06:10  Phos  2.7     01-11  Mg     1.70     01-11    TPro  6.2  /  Alb  2.6<L>  /  TBili  0.6  /  DBili  x   /  AST  18  /  ALT  16  /  AlkPhos  64  01-11    proBNP:   Lipid Profile:   HgA1c:   TSH:     Consultant(s) Notes Reviewed:  [x ] YES  [ ] NO    Care Discussed with Consultants/Other Providers [ x] YES  [ ] NO    Imaging Personally Reviewed independently:  [x] YES  [ ] NO    All labs, radiologic studies, vitals, orders and medications list reviewed. Patient is seen and examined at bedside. Case discussed with medical team.

## 2024-01-11 NOTE — PROGRESS NOTE ADULT - SUBJECTIVE AND OBJECTIVE BOX
Surgery Daily Progress Note  =====================================================  SUBJECTIVE: A 89y Male Patient seen and examined at bedside on AM rounds. Patients denies pain, nausea, vomiting. He complains of continuos cough.    ALLERGIES:  fluoroquinolone antibiotics (Other)  Cipro (Unknown)  Tegretol (Unknown)  carbamazepine (Other)      --------------------------------------------------------------------------------------    MEDICATIONS:    Neurologic Medications  acetaminophen   IVPB .. 1000 milliGRAM(s) IV Intermittent every 6 hours PRN Mild Pain (1 - 3), Moderate Pain (4 - 6)  escitalopram 10 milliGRAM(s) Oral daily  gabapentin Solution 100 milliGRAM(s) Oral two times a day  levETIRAcetam   Injectable 250 milliGRAM(s) IV Push every 12 hours  melatonin 3 milliGRAM(s) Oral at bedtime  memantine 5 milliGRAM(s) Oral two times a day    Respiratory Medications  acetylcysteine 20%  Inhalation 4 milliLiter(s) Inhalation every 6 hours  albuterol/ipratropium for Nebulization 3 milliLiter(s) Nebulizer every 12 hours  albuterol/ipratropium for Nebulization 3 milliLiter(s) Nebulizer every 6 hours PRN Shortness of Breath and/or Wheezing  dornase cierra Solution 2.5 milliGRAM(s) Inhalation every 12 hours  guaiFENesin ER 1200 milliGRAM(s) Oral every 12 hours  mometasone 110 MICROgram(s) Inhaler 2 Puff(s) Inhalation every 12 hours    Cardiovascular Medications  amLODIPine   Tablet 5 milliGRAM(s) Oral daily  furosemide   Injectable 40 milliGRAM(s) IV Push daily  metoprolol tartrate Injectable 5 milliGRAM(s) IV Push every 6 hours  ranolazine 500 milliGRAM(s) Oral two times a day    Gastrointestinal Medications  pantoprazole  Injectable 40 milliGRAM(s) IV Push every 12 hours  sucralfate 1 Gram(s) Oral every 12 hours    Genitourinary Medications    Hematologic/Oncologic Medications  aspirin  chewable 81 milliGRAM(s) Oral daily  heparin   Injectable 5000 Unit(s) SubCutaneous every 8 hours  ticagrelor 60 milliGRAM(s) Enteral Tube every 12 hours    Antimicrobial/Immunologic Medications    Endocrine/Metabolic Medications  insulin glargine Injectable (LANTUS) 14 Unit(s) SubCutaneous at bedtime  insulin lispro (ADMELOG) corrective regimen sliding scale   SubCutaneous at bedtime  insulin lispro (ADMELOG) corrective regimen sliding scale   SubCutaneous three times a day before meals    Topical/Other Medications    --------------------------------------------------------------------------------------    VITAL SIGNS:  fluoroquinolone antibiotics (Other)  Cipro (Unknown)  Tegretol (Unknown)  carbamazepine (Other)      T(C): 36.2 (01-11-24 @ 02:29), Max: 36.9 (01-10-24 @ 21:35)  HR: 77 (01-11-24 @ 02:29) (77 - 86)  BP: 122/62 (01-11-24 @ 03:07) (114/47 - 150/52)  RR: 17 (01-11-24 @ 02:29) (17 - 19)  SpO2: 98% (01-11-24 @ 02:29) (97% - 99%)  --------------------------------------------------------------------------------------    Physical Exam:  General Appearance: nad  Respiratory: NC, lots of secretions with mild wheezing.  Abd: soft, nontender  Extremities: No peripheral edema. Regular range of motion. Palpable DP on b/l LE.    --------------------------------------------------------------------------------------    I&Os  T(C): 36.2 (01-11-24 @ 02:29), Max: 36.9 (01-10-24 @ 21:35)  HR: 77 (01-11-24 @ 02:29) (77 - 86)  BP: 122/62 (01-11-24 @ 03:07) (114/47 - 150/52)  RR: 17 (01-11-24 @ 02:29) (17 - 19)  SpO2: 98% (01-11-24 @ 02:29) (97% - 99%)  --------------------------------------------------------------------------------------    LABS                          9.3    7.51  )-----------( 299      ( 11 Jan 2024 06:10 )             28.6     01-11    140  |  102  |  20  ----------------------------<  197<H>  4.3   |  26  |  1.47<H>    Ca    8.2<L>      11 Jan 2024 06:10  Phos  2.7     01-11  Mg     1.70     01-11    TPro  6.2  /  Alb  2.6<L>  /  TBili  0.6  /  DBili  x   /  AST  18  /  ALT  16  /  AlkPhos  64  01-11      Urinalysis Basic - ( 11 Jan 2024 06:10 )    Color: x / Appearance: x / SG: x / pH: x  Gluc: 197 mg/dL / Ketone: x  / Bili: x / Urobili: x   Blood: x / Protein: x / Nitrite: x   Leuk Esterase: x / RBC: x / WBC x   Sq Epi: x / Non Sq Epi: x / Bacteria: x        acetaminophen   IVPB .. 1000 milliGRAM(s) IV Intermittent every 6 hours PRN  acetylcysteine 20%  Inhalation 4 milliLiter(s) Inhalation every 6 hours  albuterol/ipratropium for Nebulization 3 milliLiter(s) Nebulizer every 6 hours PRN  albuterol/ipratropium for Nebulization 3 milliLiter(s) Nebulizer every 12 hours  amLODIPine   Tablet 5 milliGRAM(s) Oral daily  aspirin  chewable 81 milliGRAM(s) Oral daily  dornase cierra Solution 2.5 milliGRAM(s) Inhalation every 12 hours  escitalopram 10 milliGRAM(s) Oral daily  furosemide   Injectable 40 milliGRAM(s) IV Push daily  gabapentin Solution 100 milliGRAM(s) Oral two times a day  guaiFENesin ER 1200 milliGRAM(s) Oral every 12 hours  heparin   Injectable 5000 Unit(s) SubCutaneous every 8 hours  insulin glargine Injectable (LANTUS) 14 Unit(s) SubCutaneous at bedtime  insulin lispro (ADMELOG) corrective regimen sliding scale   SubCutaneous at bedtime  insulin lispro (ADMELOG) corrective regimen sliding scale   SubCutaneous three times a day before meals  levETIRAcetam   Injectable 250 milliGRAM(s) IV Push every 12 hours  melatonin 3 milliGRAM(s) Oral at bedtime  memantine 5 milliGRAM(s) Oral two times a day  metoprolol tartrate Injectable 5 milliGRAM(s) IV Push every 6 hours  mometasone 110 MICROgram(s) Inhaler 2 Puff(s) Inhalation every 12 hours  pantoprazole  Injectable 40 milliGRAM(s) IV Push every 12 hours  ranolazine 500 milliGRAM(s) Oral two times a day  sucralfate 1 Gram(s) Oral every 12 hours  ticagrelor 60 milliGRAM(s) Enteral Tube every 12 hours      RADIOLOGY, EKG & ADDITIONAL TESTS: Reviewed.

## 2024-01-11 NOTE — PROGRESS NOTE ADULT - ASSESSMENT
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 Patient states that he has been having a dry cough for the past week, worsened over the past few days. Denies SOB with the cough, denies hemoptysis. Reports fevers at home to 101.5 with associated diaphoresis. Said that he has significant pinprick like b/l chest pain with his cough but denies any chest pain when not coughing or with ambulation. Also reports rhinorrhea and sore throat. Reports generalized weakness and poor appetite. States his daughter was visiting him over the week end last week and she was positive for COVID19. Patient tested positive for COVID19 2 days prior and was started on paxlovid as outpatient. Of note, family states that the patient has had worsening confusion at home over the past 6 months (becoming disoriented to time) but recently has been more confused, talking about seeing people that are not present.   On arrival to the ED his vitals were T 98 -> 99.2, P 80, /72, RR 18, ) sat 100% RA. His lab work showed leukocytosis with neutrophilia and bandemia, MABLE, mild hyponatremia, indeterminant but stable troponins, and elevated lactate to 2.3. His COVID19 swab was positive. His CXR showed bibasilar crackles.  (23 Dec 2023 22:51):  pt has been here for past two weeks and now pulm called fro excessive secretions   he is able to answer simple questions:  grand sons at bedside:     Covid / Resp failure/on 2 L of oxygen  :  CADS/P CABG  DM  CKD  HTN  CVA X 3    Covid / Resp failure/on 2 L of oxygen:  -he was treated for covid before:   -he has excessive secretions  -keep o2 sao2 above 90%  -add BD and mucomyst: ;  -add mucinex:   1/10: he seems to be doing  ok: cont mucomyst and bd   1/11 ?: add benzonatate and Robitussin prm : dc sornase  CADS/P CABG  -cont current medications   DM  monitor and control   CKD  -cont current meds   HTN   -controlled  CVA X 3  -doing ok :     dw family

## 2024-01-11 NOTE — CHART NOTE - NSCHARTNOTEFT_GEN_A_CORE
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 CTA demonstrating mesenteric fat stranding associated with ascending/transverse colon. S/p SMA angiography with stent placement with diagnostic laparoscopy 12/24, small bowel and visible colon viable, some inflammation of omentum in RUQ. Course c/b GI bleed, s/p scope on 1/2 with GI with clips placed.     Transferred to medicine 1/8 for management of medical problems including respiratory status. Patient no longer warranting vascular surgery care.     Please continue ASA and Brilinta, as well as SQH for dvt ppx. NTD for SVT - warm compress and elevation.   Vascular surgery to sign off. Please re-consult as needed or for worsening abdominal pain.

## 2024-01-11 NOTE — PROGRESS NOTE ADULT - SUBJECTIVE AND OBJECTIVE BOX
Date of Service  : 01-11-24     INTERVAL HPI/OVERNIGHT EVENTS: Seen and examined this  AM with son in room. Said he has been coughing all night.   Vital Signs Last 24 Hrs  T(C): 36.3 (11 Jan 2024 12:40), Max: 36.9 (10 Dmitri 2024 21:35)  T(F): 97.4 (11 Jan 2024 12:40), Max: 98.5 (10 Dmitri 2024 21:35)  HR: 81 (11 Jan 2024 12:40) (77 - 85)  BP: 134/61 (11 Jan 2024 12:40) (114/47 - 150/52)  BP(mean): --  RR: 17 (11 Jan 2024 12:40) (17 - 19)  SpO2: 95% (11 Jan 2024 12:40) (95% - 99%)    Parameters below as of 11 Jan 2024 12:40  Patient On (Oxygen Delivery Method): nasal cannula  O2 Flow (L/min): 3    I&O's Summary    11 Jan 2024 07:01  -  11 Jan 2024 15:03  --------------------------------------------------------  IN: 0 mL / OUT: 1000 mL / NET: -1000 mL      MEDICATIONS  (STANDING):  acetylcysteine 20%  Inhalation 4 milliLiter(s) Inhalation every 6 hours  albuterol/ipratropium for Nebulization 3 milliLiter(s) Nebulizer every 12 hours  amLODIPine   Tablet 5 milliGRAM(s) Oral daily  aspirin  chewable 81 milliGRAM(s) Oral daily  benzonatate 100 milliGRAM(s) Oral every 8 hours  escitalopram 10 milliGRAM(s) Oral daily  furosemide   Injectable 40 milliGRAM(s) IV Push daily  gabapentin Solution 100 milliGRAM(s) Oral two times a day  guaiFENesin ER 1200 milliGRAM(s) Oral every 12 hours  guaiFENesin Oral Liquid (Sugar-Free) 200 milliGRAM(s) Oral every 6 hours  heparin   Injectable 5000 Unit(s) SubCutaneous every 8 hours  insulin glargine Injectable (LANTUS) 14 Unit(s) SubCutaneous at bedtime  insulin lispro (ADMELOG) corrective regimen sliding scale   SubCutaneous at bedtime  insulin lispro (ADMELOG) corrective regimen sliding scale   SubCutaneous three times a day before meals  levETIRAcetam   Injectable 250 milliGRAM(s) IV Push every 12 hours  melatonin 3 milliGRAM(s) Oral at bedtime  memantine 5 milliGRAM(s) Oral two times a day  metoprolol tartrate Injectable 5 milliGRAM(s) IV Push every 6 hours  mometasone 110 MICROgram(s) Inhaler 2 Puff(s) Inhalation every 12 hours  pantoprazole  Injectable 40 milliGRAM(s) IV Push every 12 hours  ranolazine 500 milliGRAM(s) Oral two times a day  sucralfate 1 Gram(s) Oral every 12 hours  ticagrelor 60 milliGRAM(s) Enteral Tube every 12 hours    MEDICATIONS  (PRN):  acetaminophen   IVPB .. 1000 milliGRAM(s) IV Intermittent every 6 hours PRN Mild Pain (1 - 3), Moderate Pain (4 - 6)  albuterol/ipratropium for Nebulization 3 milliLiter(s) Nebulizer every 6 hours PRN Shortness of Breath and/or Wheezing  benzonatate 100 milliGRAM(s) Oral every 8 hours PRN Cough  guaiFENesin Oral Liquid (Sugar-Free) 100 milliGRAM(s) Oral every 6 hours PRN Cough    LABS:                        9.3    7.51  )-----------( 299      ( 11 Jan 2024 06:10 )             28.6     01-11    140  |  102  |  20  ----------------------------<  197<H>  4.3   |  26  |  1.47<H>    Ca    8.2<L>      11 Jan 2024 06:10  Phos  2.7     01-11  Mg     1.70     01-11    TPro  6.2  /  Alb  2.6<L>  /  TBili  0.6  /  DBili  x   /  AST  18  /  ALT  16  /  AlkPhos  64  01-11      Urinalysis Basic - ( 11 Jan 2024 06:10 )    Color: x / Appearance: x / SG: x / pH: x  Gluc: 197 mg/dL / Ketone: x  / Bili: x / Urobili: x   Blood: x / Protein: x / Nitrite: x   Leuk Esterase: x / RBC: x / WBC x   Sq Epi: x / Non Sq Epi: x / Bacteria: x      CAPILLARY BLOOD GLUCOSE      POCT Blood Glucose.: 201 mg/dL (11 Jan 2024 12:38)  POCT Blood Glucose.: 299 mg/dL (11 Jan 2024 09:02)  POCT Blood Glucose.: 236 mg/dL (10 Dmitri 2024 21:21)  POCT Blood Glucose.: 112 mg/dL (10 Dmitri 2024 17:56)        Urinalysis Basic - ( 11 Jan 2024 06:10 )    Color: x / Appearance: x / SG: x / pH: x  Gluc: 197 mg/dL / Ketone: x  / Bili: x / Urobili: x   Blood: x / Protein: x / Nitrite: x   Leuk Esterase: x / RBC: x / WBC x   Sq Epi: x / Non Sq Epi: x / Bacteria: x            Consultant(s) Notes Reviewed:  [x ] YES  [ ] NO    PHYSICAL EXAM:  GENERAL: Not in any distress ,NC Oxygen   HEAD:  Atraumatic, Normocephalic  NECK: Supple, No JVD, Normal thyroid  NERVOUS SYSTEM:  Alert & Oriented X3, No focal deficit   CHEST/LUNG: Good air entry bilateral with no  rales, rhonchi, wheezing, or rubs  HEART: Regular rate and rhythm; No murmurs, rubs, or gallops  ABDOMEN: Soft, Nontender, Nondistended; Bowel sounds present  EXTREMITIES:  2+ edema      Care Discussed with Consultants/Other Providers [ x] YES  [ ] NO

## 2024-01-11 NOTE — PROGRESS NOTE ADULT - SUBJECTIVE AND OBJECTIVE BOX
Date of Service: 01-11-24 @ 12:06    Patient is a 89y old  Male who presents with a chief complaint of COVID19, Chest pain (11 Jan 2024 07:27)      Any change in ROS:  he is doing reasonable: has cough     MEDICATIONS  (STANDING):  acetylcysteine 20%  Inhalation 4 milliLiter(s) Inhalation every 6 hours  albuterol/ipratropium for Nebulization 3 milliLiter(s) Nebulizer every 12 hours  amLODIPine   Tablet 5 milliGRAM(s) Oral daily  aspirin  chewable 81 milliGRAM(s) Oral daily  dornase cierra Solution 2.5 milliGRAM(s) Inhalation every 12 hours  escitalopram 10 milliGRAM(s) Oral daily  furosemide   Injectable 40 milliGRAM(s) IV Push daily  gabapentin Solution 100 milliGRAM(s) Oral two times a day  guaiFENesin ER 1200 milliGRAM(s) Oral every 12 hours  heparin   Injectable 5000 Unit(s) SubCutaneous every 8 hours  insulin glargine Injectable (LANTUS) 14 Unit(s) SubCutaneous at bedtime  insulin lispro (ADMELOG) corrective regimen sliding scale   SubCutaneous at bedtime  insulin lispro (ADMELOG) corrective regimen sliding scale   SubCutaneous three times a day before meals  levETIRAcetam   Injectable 250 milliGRAM(s) IV Push every 12 hours  melatonin 3 milliGRAM(s) Oral at bedtime  memantine 5 milliGRAM(s) Oral two times a day  metoprolol tartrate Injectable 5 milliGRAM(s) IV Push every 6 hours  mometasone 110 MICROgram(s) Inhaler 2 Puff(s) Inhalation every 12 hours  pantoprazole  Injectable 40 milliGRAM(s) IV Push every 12 hours  ranolazine 500 milliGRAM(s) Oral two times a day  sucralfate 1 Gram(s) Oral every 12 hours  ticagrelor 60 milliGRAM(s) Enteral Tube every 12 hours    MEDICATIONS  (PRN):  acetaminophen   IVPB .. 1000 milliGRAM(s) IV Intermittent every 6 hours PRN Mild Pain (1 - 3), Moderate Pain (4 - 6)  albuterol/ipratropium for Nebulization 3 milliLiter(s) Nebulizer every 6 hours PRN Shortness of Breath and/or Wheezing  benzonatate 100 milliGRAM(s) Oral every 8 hours PRN Cough  guaiFENesin Oral Liquid (Sugar-Free) 100 milliGRAM(s) Oral every 6 hours PRN Cough    Vital Signs Last 24 Hrs  T(C): 36.3 (11 Jan 2024 06:27), Max: 36.9 (10 Dmitri 2024 21:35)  T(F): 97.4 (11 Jan 2024 06:27), Max: 98.5 (10 Dmitri 2024 21:35)  HR: 85 (11 Jan 2024 08:48) (77 - 86)  BP: 145/54 (11 Jan 2024 06:27) (114/47 - 150/52)  BP(mean): --  RR: 18 (11 Jan 2024 06:27) (17 - 19)  SpO2: 99% (11 Jan 2024 06:27) (97% - 99%)    Parameters below as of 11 Jan 2024 10:00  Patient On (Oxygen Delivery Method): nasal cannula        I&O's Summary        Physical Exam:   GENERAL: NAD, well-groomed, well-developed  HEENT: JAVAD/   Atraumatic, Normocephalic  ENMT: No tonsillar erythema, exudates, or enlargement; Moist mucous membranes, Good dentition, No lesions  NECK: Supple, No JVD, Normal thyroid  CHEST/LUNG: Clear to auscultaion-no wheezing  CVS: Regular rate and rhythm; No murmurs, rubs, or gallops  GI: : Soft, Nontender, Nondistended; Bowel sounds present  NERVOUS SYSTEM:  Alert & awake  EXTREMITIES: - edema  LYMPH: No lymphadenopathy noted  SKIN: No rashes or lesions  ENDOCRINOLOGY: No Thyromegaly  PSYCH: Appropriate    Labs:                              9.3    7.51  )-----------( 299      ( 11 Jan 2024 06:10 )             28.6                         8.7    7.26  )-----------( 274      ( 10 Dmitri 2024 06:40 )             27.4                         9.1    7.64  )-----------( 326      ( 09 Jan 2024 16:39 )             29.5                         8.0    6.48  )-----------( 284      ( 09 Jan 2024 08:45 )             25.7     01-11    140  |  102  |  20  ----------------------------<  197<H>  4.3   |  26  |  1.47<H>  01-10    142  |  105  |  20  ----------------------------<  144<H>  4.5   |  26  |  1.39<H>  01-09    140  |  108<H>  |  21  ----------------------------<  146<H>  5.3   |  25  |  1.33<H>    Ca    8.2<L>      11 Jan 2024 06:10  Ca    8.4      10 Dmitri 2024 06:40  Phos  2.7     01-11  Phos  2.7     01-10  Mg     1.70     01-11  Mg     1.90     01-10    TPro  6.2  /  Alb  2.6<L>  /  TBili  0.6  /  DBili  x   /  AST  18  /  ALT  16  /  AlkPhos  64  01-11  TPro  6.7  /  Alb  2.8<L>  /  TBili  0.5  /  DBili  x   /  AST  20  /  ALT  16  /  AlkPhos  73  01-10  TPro  6.0  /  Alb  2.8<L>  /  TBili  0.4  /  DBili  x   /  AST  16  /  ALT  18  /  AlkPhos  78  01-09    CAPILLARY BLOOD GLUCOSE      POCT Blood Glucose.: 299 mg/dL (11 Jan 2024 09:02)  POCT Blood Glucose.: 236 mg/dL (10 Dmitri 2024 21:21)  POCT Blood Glucose.: 112 mg/dL (10 Dmitri 2024 17:56)  POCT Blood Glucose.: 86 mg/dL (10 Dmitri 2024 12:51)      LIVER FUNCTIONS - ( 11 Jan 2024 06:10 )  Alb: 2.6 g/dL / Pro: 6.2 g/dL / ALK PHOS: 64 U/L / ALT: 16 U/L / AST: 18 U/L / GGT: x             Urinalysis Basic - ( 11 Jan 2024 06:10 )    Color: x / Appearance: x / SG: x / pH: x  Gluc: 197 mg/dL / Ketone: x  / Bili: x / Urobili: x   Blood: x / Protein: x / Nitrite: x   Leuk Esterase: x / RBC: x / WBC x   Sq Epi: x / Non Sq Epi: x / Bacteria: x      rad< from: Xray Chest 1 View- PORTABLE-Urgent (Xray Chest 1 View- PORTABLE-Urgent .) (01.09.24 @ 14:53) >    ACC: [id number] EXAM:  XR CHEST PORTABLE URGENT 1V   ORDERED BY: EDWARD MARINO     PROCEDURE DATE:  01/09/2024          INTERPRETATION:  CLINICAL INDICATION: Abnormal Chest Sounds    TECHNIQUE: Single frontal, portable view of the chest was obtained.    COMPARISON: Chest x-ray 1/6/2024.    FINDINGS:  Left chest wall pacemaker. Cardiac stent. Enteric tube with tip in the   distal stomach/proximal duodenum. Sternotomy wires.  The heart size is normal.  Slight increase in size of moderate bilateral pleural effusions.  No pneumothorax.    IMPRESSION:  Slight increase in size of moderate bilateral pleural effusions.    --- End of Report ---          FABRICE DUMONT MD; Resident Radiologist  This document has been electronically signed.  AMELIA ANGLIN MD; Attending Radiologist  This document has been electronically signed. Jan 9 2024  3:18PM    < end of copied text >  < from: US Duplex Venous Lower Ext Complete, Bilateral (01.08.24 @ 13:36) >  TECHNIQUE: Duplex sonography of the BILATERAL LOWER extremity veins with   color and spectral Doppler, with and without compression.    FINDINGS:    RIGHT:  Normal compressibility of the RIGHT common femoral, femoral and popliteal   veins.  Doppler examination shows normal spontaneous and phasic flow.  No RIGHT calf vein thrombosis is detected.    LEFT:  Normal compressibility of the LEFT common femoral, femoral and popliteal   veins.  Doppler examination shows normal spontaneous and phasic flow.  No LEFT calf vein thrombosis is detected.    IMPRESSION:  No evidence of deep venous thrombosis in either lower extremity.            --- End of Report ---            TRACY WEISS MD; Attending Radiologist  This document has been electronically signed. Jan 8 2024  1:38PM    < end of copied text >  < from: CT Chest w/ IV Cont (12.24.23 @ 18:38) >  Patent SMV, portal vein and hepatic veins. Large calcific plaques at the   origin of the renalarteries, both patent, renal veins patent.  RETROPERITONEUM/LYMPH NODES: No lymphadenopathy.  ABDOMINAL WALL: Bilateral fat-containing inguinal hernia small amount of   fluid in the right inguinal canal.  BONES: Remote consolidated rib fractures of the lower right posterior   ribs. Sternotomy. Mild shoulder joint degeneration bilaterally   syndesmophytes in the thoracic spine and bridging osteophytes in the   cervical spine. Minimal discogenic degenerative changes.    IMPRESSION:  Distended gallbladder with stones and gallbladder wall thickening as well   as mural edema concerning for acute cholecystitis. The surrounding   inflammatory changes extend to the hepatic flexure of the colon. Further   evaluation with right upper quadrant ultrasound is recommended to assess   for acute cholecystitis.    Mild wall thickening of the ascending colon with surrounding inflammatory   changes as above but with preserved vascularity of the colon.   Inflammation is favored to be secondary to the adjacent inflamed   gallbladder versus mild colitis. No colonic pneumatosis to suggest   manifest ischemia.    Moderate atherosclerotic changes of the abdominal aorta, calcific and   soft plaques at the origin of the patent SMA with focal stenosis but no   occlusion.    Mildly distended small bowel loops with air-fluid levels but no     < end of copied text >        RECENT CULTURES:        RESPIRATORY CULTURES:          Studies  Chest X-RAY  CT SCAN Chest   Venous Dopplers: LE:   CT Abdomen  Others               Date of Service: 01-11-24 @ 12:06    Patient is a 89y old  Male who presents with a chief complaint of COVID19, Chest pain (11 Jan 2024 07:27)      Any change in ROS:  he is doing reasonable: has cough     MEDICATIONS  (STANDING):  acetylcysteine 20%  Inhalation 4 milliLiter(s) Inhalation every 6 hours  albuterol/ipratropium for Nebulization 3 milliLiter(s) Nebulizer every 12 hours  amLODIPine   Tablet 5 milliGRAM(s) Oral daily  aspirin  chewable 81 milliGRAM(s) Oral daily  dornase cierra Solution 2.5 milliGRAM(s) Inhalation every 12 hours  escitalopram 10 milliGRAM(s) Oral daily  furosemide   Injectable 40 milliGRAM(s) IV Push daily  gabapentin Solution 100 milliGRAM(s) Oral two times a day  guaiFENesin ER 1200 milliGRAM(s) Oral every 12 hours  heparin   Injectable 5000 Unit(s) SubCutaneous every 8 hours  insulin glargine Injectable (LANTUS) 14 Unit(s) SubCutaneous at bedtime  insulin lispro (ADMELOG) corrective regimen sliding scale   SubCutaneous at bedtime  insulin lispro (ADMELOG) corrective regimen sliding scale   SubCutaneous three times a day before meals  levETIRAcetam   Injectable 250 milliGRAM(s) IV Push every 12 hours  melatonin 3 milliGRAM(s) Oral at bedtime  memantine 5 milliGRAM(s) Oral two times a day  metoprolol tartrate Injectable 5 milliGRAM(s) IV Push every 6 hours  mometasone 110 MICROgram(s) Inhaler 2 Puff(s) Inhalation every 12 hours  pantoprazole  Injectable 40 milliGRAM(s) IV Push every 12 hours  ranolazine 500 milliGRAM(s) Oral two times a day  sucralfate 1 Gram(s) Oral every 12 hours  ticagrelor 60 milliGRAM(s) Enteral Tube every 12 hours    MEDICATIONS  (PRN):  acetaminophen   IVPB .. 1000 milliGRAM(s) IV Intermittent every 6 hours PRN Mild Pain (1 - 3), Moderate Pain (4 - 6)  albuterol/ipratropium for Nebulization 3 milliLiter(s) Nebulizer every 6 hours PRN Shortness of Breath and/or Wheezing  benzonatate 100 milliGRAM(s) Oral every 8 hours PRN Cough  guaiFENesin Oral Liquid (Sugar-Free) 100 milliGRAM(s) Oral every 6 hours PRN Cough    Vital Signs Last 24 Hrs  T(C): 36.3 (11 Jan 2024 06:27), Max: 36.9 (10 Dmitri 2024 21:35)  T(F): 97.4 (11 Jan 2024 06:27), Max: 98.5 (10 Dmitri 2024 21:35)  HR: 85 (11 Jan 2024 08:48) (77 - 86)  BP: 145/54 (11 Jan 2024 06:27) (114/47 - 150/52)  BP(mean): --  RR: 18 (11 Jan 2024 06:27) (17 - 19)  SpO2: 99% (11 Jan 2024 06:27) (97% - 99%)    Parameters below as of 11 Jan 2024 10:00  Patient On (Oxygen Delivery Method): nasal cannula        I&O's Summary        Physical Exam:   GENERAL: NAD, well-groomed, well-developed  HEENT: JAVAD/   Atraumatic, Normocephalic  ENMT: No tonsillar erythema, exudates, or enlargement; Moist mucous membranes, Good dentition, No lesions  NECK: Supple, No JVD, Normal thyroid  CHEST/LUNG: Clear to auscultaion-no wheezing  CVS: Regular rate and rhythm; No murmurs, rubs, or gallops  GI: : Soft, Nontender, Nondistended; Bowel sounds present  NERVOUS SYSTEM:  Alert & awake  EXTREMITIES: - edema  LYMPH: No lymphadenopathy noted  SKIN: No rashes or lesions  ENDOCRINOLOGY: No Thyromegaly  PSYCH: Appropriate    Labs:                              9.3    7.51  )-----------( 299      ( 11 Jan 2024 06:10 )             28.6                         8.7    7.26  )-----------( 274      ( 10 Dmitir 2024 06:40 )             27.4                         9.1    7.64  )-----------( 326      ( 09 Jan 2024 16:39 )             29.5                         8.0    6.48  )-----------( 284      ( 09 Jan 2024 08:45 )             25.7     01-11    140  |  102  |  20  ----------------------------<  197<H>  4.3   |  26  |  1.47<H>  01-10    142  |  105  |  20  ----------------------------<  144<H>  4.5   |  26  |  1.39<H>  01-09    140  |  108<H>  |  21  ----------------------------<  146<H>  5.3   |  25  |  1.33<H>    Ca    8.2<L>      11 Jan 2024 06:10  Ca    8.4      10 Dmitri 2024 06:40  Phos  2.7     01-11  Phos  2.7     01-10  Mg     1.70     01-11  Mg     1.90     01-10    TPro  6.2  /  Alb  2.6<L>  /  TBili  0.6  /  DBili  x   /  AST  18  /  ALT  16  /  AlkPhos  64  01-11  TPro  6.7  /  Alb  2.8<L>  /  TBili  0.5  /  DBili  x   /  AST  20  /  ALT  16  /  AlkPhos  73  01-10  TPro  6.0  /  Alb  2.8<L>  /  TBili  0.4  /  DBili  x   /  AST  16  /  ALT  18  /  AlkPhos  78  01-09    CAPILLARY BLOOD GLUCOSE      POCT Blood Glucose.: 299 mg/dL (11 Jan 2024 09:02)  POCT Blood Glucose.: 236 mg/dL (10 Dmitri 2024 21:21)  POCT Blood Glucose.: 112 mg/dL (10 Dmitri 2024 17:56)  POCT Blood Glucose.: 86 mg/dL (10 Dmitri 2024 12:51)      LIVER FUNCTIONS - ( 11 Jan 2024 06:10 )  Alb: 2.6 g/dL / Pro: 6.2 g/dL / ALK PHOS: 64 U/L / ALT: 16 U/L / AST: 18 U/L / GGT: x             Urinalysis Basic - ( 11 Jan 2024 06:10 )    Color: x / Appearance: x / SG: x / pH: x  Gluc: 197 mg/dL / Ketone: x  / Bili: x / Urobili: x   Blood: x / Protein: x / Nitrite: x   Leuk Esterase: x / RBC: x / WBC x   Sq Epi: x / Non Sq Epi: x / Bacteria: x      rad< from: Xray Chest 1 View- PORTABLE-Urgent (Xray Chest 1 View- PORTABLE-Urgent .) (01.09.24 @ 14:53) >    ACC: [id number] EXAM:  XR CHEST PORTABLE URGENT 1V   ORDERED BY: EDWARD MARINO     PROCEDURE DATE:  01/09/2024          INTERPRETATION:  CLINICAL INDICATION: Abnormal Chest Sounds    TECHNIQUE: Single frontal, portable view of the chest was obtained.    COMPARISON: Chest x-ray 1/6/2024.    FINDINGS:  Left chest wall pacemaker. Cardiac stent. Enteric tube with tip in the   distal stomach/proximal duodenum. Sternotomy wires.  The heart size is normal.  Slight increase in size of moderate bilateral pleural effusions.  No pneumothorax.    IMPRESSION:  Slight increase in size of moderate bilateral pleural effusions.    --- End of Report ---          FABRICE DUMONT MD; Resident Radiologist  This document has been electronically signed.  AMELIA ANGLIN MD; Attending Radiologist  This document has been electronically signed. Jan 9 2024  3:18PM    < end of copied text >  < from: US Duplex Venous Lower Ext Complete, Bilateral (01.08.24 @ 13:36) >  TECHNIQUE: Duplex sonography of the BILATERAL LOWER extremity veins with   color and spectral Doppler, with and without compression.    FINDINGS:    RIGHT:  Normal compressibility of the RIGHT common femoral, femoral and popliteal   veins.  Doppler examination shows normal spontaneous and phasic flow.  No RIGHT calf vein thrombosis is detected.    LEFT:  Normal compressibility of the LEFT common femoral, femoral and popliteal   veins.  Doppler examination shows normal spontaneous and phasic flow.  No LEFT calf vein thrombosis is detected.    IMPRESSION:  No evidence of deep venous thrombosis in either lower extremity.            --- End of Report ---            TRACY WEISS MD; Attending Radiologist  This document has been electronically signed. Jan 8 2024  1:38PM    < end of copied text >  < from: CT Chest w/ IV Cont (12.24.23 @ 18:38) >  Patent SMV, portal vein and hepatic veins. Large calcific plaques at the   origin of the renalarteries, both patent, renal veins patent.  RETROPERITONEUM/LYMPH NODES: No lymphadenopathy.  ABDOMINAL WALL: Bilateral fat-containing inguinal hernia small amount of   fluid in the right inguinal canal.  BONES: Remote consolidated rib fractures of the lower right posterior   ribs. Sternotomy. Mild shoulder joint degeneration bilaterally   syndesmophytes in the thoracic spine and bridging osteophytes in the   cervical spine. Minimal discogenic degenerative changes.    IMPRESSION:  Distended gallbladder with stones and gallbladder wall thickening as well   as mural edema concerning for acute cholecystitis. The surrounding   inflammatory changes extend to the hepatic flexure of the colon. Further   evaluation with right upper quadrant ultrasound is recommended to assess   for acute cholecystitis.    Mild wall thickening of the ascending colon with surrounding inflammatory   changes as above but with preserved vascularity of the colon.   Inflammation is favored to be secondary to the adjacent inflamed   gallbladder versus mild colitis. No colonic pneumatosis to suggest   manifest ischemia.    Moderate atherosclerotic changes of the abdominal aorta, calcific and   soft plaques at the origin of the patent SMA with focal stenosis but no   occlusion.    Mildly distended small bowel loops with air-fluid levels but no     < end of copied text >        RECENT CULTURES:        RESPIRATORY CULTURES:          Studies  Chest X-RAY  CT SCAN Chest   Venous Dopplers: LE:   CT Abdomen  Others

## 2024-01-12 LAB
ANION GAP SERPL CALC-SCNC: 10 MMOL/L — SIGNIFICANT CHANGE UP (ref 7–14)
ANION GAP SERPL CALC-SCNC: 10 MMOL/L — SIGNIFICANT CHANGE UP (ref 7–14)
BUN SERPL-MCNC: 17 MG/DL — SIGNIFICANT CHANGE UP (ref 7–23)
BUN SERPL-MCNC: 17 MG/DL — SIGNIFICANT CHANGE UP (ref 7–23)
CALCIUM SERPL-MCNC: 8.4 MG/DL — SIGNIFICANT CHANGE UP (ref 8.4–10.5)
CALCIUM SERPL-MCNC: 8.4 MG/DL — SIGNIFICANT CHANGE UP (ref 8.4–10.5)
CHLORIDE SERPL-SCNC: 101 MMOL/L — SIGNIFICANT CHANGE UP (ref 98–107)
CHLORIDE SERPL-SCNC: 101 MMOL/L — SIGNIFICANT CHANGE UP (ref 98–107)
CO2 SERPL-SCNC: 30 MMOL/L — SIGNIFICANT CHANGE UP (ref 22–31)
CO2 SERPL-SCNC: 30 MMOL/L — SIGNIFICANT CHANGE UP (ref 22–31)
CREAT SERPL-MCNC: 1.48 MG/DL — HIGH (ref 0.5–1.3)
CREAT SERPL-MCNC: 1.48 MG/DL — HIGH (ref 0.5–1.3)
EGFR: 45 ML/MIN/1.73M2 — LOW
EGFR: 45 ML/MIN/1.73M2 — LOW
GLUCOSE BLDC GLUCOMTR-MCNC: 120 MG/DL — HIGH (ref 70–99)
GLUCOSE BLDC GLUCOMTR-MCNC: 120 MG/DL — HIGH (ref 70–99)
GLUCOSE BLDC GLUCOMTR-MCNC: 131 MG/DL — HIGH (ref 70–99)
GLUCOSE BLDC GLUCOMTR-MCNC: 131 MG/DL — HIGH (ref 70–99)
GLUCOSE BLDC GLUCOMTR-MCNC: 135 MG/DL — HIGH (ref 70–99)
GLUCOSE BLDC GLUCOMTR-MCNC: 135 MG/DL — HIGH (ref 70–99)
GLUCOSE BLDC GLUCOMTR-MCNC: 87 MG/DL — SIGNIFICANT CHANGE UP (ref 70–99)
GLUCOSE BLDC GLUCOMTR-MCNC: 87 MG/DL — SIGNIFICANT CHANGE UP (ref 70–99)
GLUCOSE SERPL-MCNC: 104 MG/DL — HIGH (ref 70–99)
GLUCOSE SERPL-MCNC: 104 MG/DL — HIGH (ref 70–99)
HCT VFR BLD CALC: 28.5 % — LOW (ref 39–50)
HCT VFR BLD CALC: 28.5 % — LOW (ref 39–50)
HGB BLD-MCNC: 9 G/DL — LOW (ref 13–17)
HGB BLD-MCNC: 9 G/DL — LOW (ref 13–17)
MAGNESIUM SERPL-MCNC: 1.6 MG/DL — SIGNIFICANT CHANGE UP (ref 1.6–2.6)
MAGNESIUM SERPL-MCNC: 1.6 MG/DL — SIGNIFICANT CHANGE UP (ref 1.6–2.6)
MCHC RBC-ENTMCNC: 30.3 PG — SIGNIFICANT CHANGE UP (ref 27–34)
MCHC RBC-ENTMCNC: 30.3 PG — SIGNIFICANT CHANGE UP (ref 27–34)
MCHC RBC-ENTMCNC: 31.6 GM/DL — LOW (ref 32–36)
MCHC RBC-ENTMCNC: 31.6 GM/DL — LOW (ref 32–36)
MCV RBC AUTO: 96 FL — SIGNIFICANT CHANGE UP (ref 80–100)
MCV RBC AUTO: 96 FL — SIGNIFICANT CHANGE UP (ref 80–100)
NRBC # BLD: 0 /100 WBCS — SIGNIFICANT CHANGE UP (ref 0–0)
NRBC # BLD: 0 /100 WBCS — SIGNIFICANT CHANGE UP (ref 0–0)
NRBC # FLD: 0 K/UL — SIGNIFICANT CHANGE UP (ref 0–0)
NRBC # FLD: 0 K/UL — SIGNIFICANT CHANGE UP (ref 0–0)
PHOSPHATE SERPL-MCNC: 2.8 MG/DL — SIGNIFICANT CHANGE UP (ref 2.5–4.5)
PHOSPHATE SERPL-MCNC: 2.8 MG/DL — SIGNIFICANT CHANGE UP (ref 2.5–4.5)
PLATELET # BLD AUTO: 312 K/UL — SIGNIFICANT CHANGE UP (ref 150–400)
PLATELET # BLD AUTO: 312 K/UL — SIGNIFICANT CHANGE UP (ref 150–400)
POTASSIUM SERPL-MCNC: 3.7 MMOL/L — SIGNIFICANT CHANGE UP (ref 3.5–5.3)
POTASSIUM SERPL-MCNC: 3.7 MMOL/L — SIGNIFICANT CHANGE UP (ref 3.5–5.3)
POTASSIUM SERPL-SCNC: 3.7 MMOL/L — SIGNIFICANT CHANGE UP (ref 3.5–5.3)
POTASSIUM SERPL-SCNC: 3.7 MMOL/L — SIGNIFICANT CHANGE UP (ref 3.5–5.3)
RBC # BLD: 2.97 M/UL — LOW (ref 4.2–5.8)
RBC # BLD: 2.97 M/UL — LOW (ref 4.2–5.8)
RBC # FLD: 19.2 % — HIGH (ref 10.3–14.5)
RBC # FLD: 19.2 % — HIGH (ref 10.3–14.5)
SODIUM SERPL-SCNC: 141 MMOL/L — SIGNIFICANT CHANGE UP (ref 135–145)
SODIUM SERPL-SCNC: 141 MMOL/L — SIGNIFICANT CHANGE UP (ref 135–145)
WBC # BLD: 7.12 K/UL — SIGNIFICANT CHANGE UP (ref 3.8–10.5)
WBC # BLD: 7.12 K/UL — SIGNIFICANT CHANGE UP (ref 3.8–10.5)
WBC # FLD AUTO: 7.12 K/UL — SIGNIFICANT CHANGE UP (ref 3.8–10.5)
WBC # FLD AUTO: 7.12 K/UL — SIGNIFICANT CHANGE UP (ref 3.8–10.5)

## 2024-01-12 RX ADMIN — Medication 3 MILLIGRAM(S): at 22:36

## 2024-01-12 RX ADMIN — GABAPENTIN 100 MILLIGRAM(S): 400 CAPSULE ORAL at 17:20

## 2024-01-12 RX ADMIN — Medication 5 MILLIGRAM(S): at 17:23

## 2024-01-12 RX ADMIN — Medication 5 MILLIGRAM(S): at 06:50

## 2024-01-12 RX ADMIN — TICAGRELOR 60 MILLIGRAM(S): 90 TABLET ORAL at 10:11

## 2024-01-12 RX ADMIN — GABAPENTIN 100 MILLIGRAM(S): 400 CAPSULE ORAL at 06:53

## 2024-01-12 RX ADMIN — Medication 1 GRAM(S): at 06:57

## 2024-01-12 RX ADMIN — MEMANTINE HYDROCHLORIDE 5 MILLIGRAM(S): 10 TABLET ORAL at 06:51

## 2024-01-12 RX ADMIN — MOMETASONE FUROATE 2 PUFF(S): 220 INHALANT RESPIRATORY (INHALATION) at 22:34

## 2024-01-12 RX ADMIN — Medication 100 MILLIGRAM(S): at 00:30

## 2024-01-12 RX ADMIN — AMLODIPINE BESYLATE 5 MILLIGRAM(S): 2.5 TABLET ORAL at 06:53

## 2024-01-12 RX ADMIN — MOMETASONE FUROATE 2 PUFF(S): 220 INHALANT RESPIRATORY (INHALATION) at 10:12

## 2024-01-12 RX ADMIN — PANTOPRAZOLE SODIUM 40 MILLIGRAM(S): 20 TABLET, DELAYED RELEASE ORAL at 17:20

## 2024-01-12 RX ADMIN — LEVETIRACETAM 250 MILLIGRAM(S): 250 TABLET, FILM COATED ORAL at 17:20

## 2024-01-12 RX ADMIN — Medication 4 MILLILITER(S): at 03:50

## 2024-01-12 RX ADMIN — Medication 1 GRAM(S): at 17:20

## 2024-01-12 RX ADMIN — Medication 3 MILLILITER(S): at 22:30

## 2024-01-12 RX ADMIN — PANTOPRAZOLE SODIUM 40 MILLIGRAM(S): 20 TABLET, DELAYED RELEASE ORAL at 06:57

## 2024-01-12 RX ADMIN — MEMANTINE HYDROCHLORIDE 5 MILLIGRAM(S): 10 TABLET ORAL at 22:35

## 2024-01-12 RX ADMIN — ESCITALOPRAM OXALATE 10 MILLIGRAM(S): 10 TABLET, FILM COATED ORAL at 12:55

## 2024-01-12 RX ADMIN — TICAGRELOR 60 MILLIGRAM(S): 90 TABLET ORAL at 22:35

## 2024-01-12 RX ADMIN — INSULIN GLARGINE 14 UNIT(S): 100 INJECTION, SOLUTION SUBCUTANEOUS at 22:32

## 2024-01-12 RX ADMIN — LEVETIRACETAM 250 MILLIGRAM(S): 250 TABLET, FILM COATED ORAL at 06:51

## 2024-01-12 RX ADMIN — Medication 3 MILLILITER(S): at 22:29

## 2024-01-12 RX ADMIN — HEPARIN SODIUM 5000 UNIT(S): 5000 INJECTION INTRAVENOUS; SUBCUTANEOUS at 06:50

## 2024-01-12 RX ADMIN — Medication 4 MILLILITER(S): at 15:48

## 2024-01-12 RX ADMIN — Medication 5 MILLIGRAM(S): at 00:31

## 2024-01-12 RX ADMIN — Medication 40 MILLIGRAM(S): at 06:49

## 2024-01-12 RX ADMIN — Medication 3 MILLILITER(S): at 15:48

## 2024-01-12 RX ADMIN — RANOLAZINE 500 MILLIGRAM(S): 500 TABLET, FILM COATED, EXTENDED RELEASE ORAL at 17:20

## 2024-01-12 RX ADMIN — Medication 4 MILLILITER(S): at 22:30

## 2024-01-12 RX ADMIN — Medication 100 MILLIGRAM(S): at 06:48

## 2024-01-12 RX ADMIN — Medication 3 MILLILITER(S): at 08:51

## 2024-01-12 RX ADMIN — Medication 81 MILLIGRAM(S): at 12:55

## 2024-01-12 RX ADMIN — Medication 4 MILLILITER(S): at 08:58

## 2024-01-12 RX ADMIN — RANOLAZINE 500 MILLIGRAM(S): 500 TABLET, FILM COATED, EXTENDED RELEASE ORAL at 06:52

## 2024-01-12 RX ADMIN — HEPARIN SODIUM 5000 UNIT(S): 5000 INJECTION INTRAVENOUS; SUBCUTANEOUS at 22:36

## 2024-01-12 RX ADMIN — HEPARIN SODIUM 5000 UNIT(S): 5000 INJECTION INTRAVENOUS; SUBCUTANEOUS at 13:52

## 2024-01-12 NOTE — PROGRESS NOTE ADULT - ASSESSMENT
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 CTA demonstrating mesenteric fat stranding associated with ascending/transverse colon. S/p SMA angiography with stent placement with diagnostic laparoscopy 12/24, small bowel and visible colon viable, some inflammation of omentum in RUQ. Course c/b GI bleed, s/p scope on 1/2 with GI with clips placed. Patient transferred overnight from SICU to the floor in clinically stable condition.       Problem/Recommendation - 1:  ·  Problem: Type 2 diabetes mellitus with hyperglycemia.   ·  Recommendation: sugars better now.   Continue Lantus and SSI.     Problem/Recommendation - 2:  ·  Problem: Acute renal failure superimposed on chronic kidney disease.   ·  Recommendation: Creatinine improving      Problem/Recommendation - 3:  ·  Problem: CAD (coronary artery disease).   ·  Recommendation: S/P CABG and Stents. On DAPT and BB.  cardiology help appreciated.     Problem/Recommendation - 4:  ·  Problem: Essential hypertension.   ·  Recommendation: BP meds with hold parameters.     Problem/Recommendation - 5:  ·  Problem: GI bleed.   ·  Recommendation: On PPI.  S/P EGD.   Impression:          - NG tube removed before endoscopy                       - LA Grade B esophagitis.                       - Bleeding Dieulafoy's lesion of the posterior wall of                        the gastric body. This is likely the source of                        clinically significant bleeding. Hemostasis achieved                        using 2 MR conditional hemoclips.                       - Bleeding edematous mucosa and nonbleeding clean based                        gastric ulcers in the posterior wall of the gastric                        body. These are likely due to NG tube irritation.                        Hemostasis achieved using 1 MR conditional hemoclip.                       - Non-bleeding small clean based duodenal ulcer                       - No specimens collected.    < end of copied text >.     Problem/Recommendation - 6:  ·  Problem: Myoclonic jerking.   ·  Recommendation: On keppra.     Problem/Recommendation - 7:  ·  Problem: Dysphagia.   ·  Recommendation:  S/P  cineesophagogram r.  Puree diet     Problem/Recommendation - 8:  ·  Problem: Abdominal distension.   ·  Recommendation: S/P  SMA angiography with stent placement with diagnostic laparoscopy 12/24,  Vascular following.     Problem/Recommendation - 9:  ·  Problem: Anemia due to acute blood loss.   ·  Recommendation: S/P PRBC.   HH stable.       Problem/Recommendation - 10:  ·  Problem: Toxic metabolic encephalopathy.   ·  Recommendation: Mental status waxes and wanes .     Problem/Recommendation - 11:  ·  Problem: 2019 novel coronavirus disease (COVID-19).   ·  Recommendation: S/P Renmdisvir.     Problem/Recommendation - 12:  ·  Problem: Leg pain  with edema    ·  Recommendation:  Diuretics,  Doppler negative for  DVT.  On Gabapentin     Problem/Recommendation - 13:  ·  Problem: Persistent Cough.   ·  Recommendation: ? Aspiration.   Cough suppressants .  Pulmonary helping       D/W patient grand son in detail . D/W ACP .  Dispo : DC planning to [facility].            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 CTA demonstrating mesenteric fat stranding associated with ascending/transverse colon. S/p SMA angiography with stent placement with diagnostic laparoscopy 12/24, small bowel and visible colon viable, some inflammation of omentum in RUQ. Course c/b GI bleed, s/p scope on 1/2 with GI with clips placed. Patient transferred overnight from SICU to the floor in clinically stable condition.       Problem/Recommendation - 1:  ·  Problem: Type 2 diabetes mellitus with hyperglycemia.   ·  Recommendation: sugars better now.   Continue Lantus and SSI.     Problem/Recommendation - 2:  ·  Problem: Acute renal failure superimposed on chronic kidney disease.   ·  Recommendation: Creatinine improving      Problem/Recommendation - 3:  ·  Problem: CAD (coronary artery disease).   ·  Recommendation: S/P CABG and Stents. On DAPT and BB.  cardiology help appreciated.     Problem/Recommendation - 4:  ·  Problem: Essential hypertension.   ·  Recommendation: BP meds with hold parameters.     Problem/Recommendation - 5:  ·  Problem: GI bleed.   ·  Recommendation: On PPI.  S/P EGD.   Impression:          - NG tube removed before endoscopy                       - LA Grade B esophagitis.                       - Bleeding Dieulafoy's lesion of the posterior wall of                        the gastric body. This is likely the source of                        clinically significant bleeding. Hemostasis achieved                        using 2 MR conditional hemoclips.                       - Bleeding edematous mucosa and nonbleeding clean based                        gastric ulcers in the posterior wall of the gastric                        body. These are likely due to NG tube irritation.                        Hemostasis achieved using 1 MR conditional hemoclip.                       - Non-bleeding small clean based duodenal ulcer                       - No specimens collected.    < end of copied text >.     Problem/Recommendation - 6:  ·  Problem: Myoclonic jerking.   ·  Recommendation: On keppra.     Problem/Recommendation - 7:  ·  Problem: Dysphagia.   ·  Recommendation:  S/P  cineesophagogram r.  Puree diet     Problem/Recommendation - 8:  ·  Problem: Abdominal distension.   ·  Recommendation: S/P  SMA angiography with stent placement with diagnostic laparoscopy 12/24,  Vascular following.     Problem/Recommendation - 9:  ·  Problem: Anemia due to acute blood loss.   ·  Recommendation: S/P PRBC.   HH stable.       Problem/Recommendation - 10:  ·  Problem: Toxic metabolic encephalopathy.   ·  Recommendation: Mental status waxes and wanes .     Problem/Recommendation - 11:  ·  Problem: 2019 novel coronavirus disease (COVID-19).   ·  Recommendation: S/P Renmdisvir.     Problem/Recommendation - 12:  ·  Problem: Leg pain  with edema    ·  Recommendation:  Diuretics,  Doppler negative for  DVT.  On Gabapentin     Problem/Recommendation - 13:  ·  Problem: Persistent Cough.   ·  Recommendation: ? Aspiration.   Cough suppressants .  Pulmonary helping       D/W patient grand son in detail . D/W ACP .  Dispo : DC planning to [facility].

## 2024-01-12 NOTE — PROGRESS NOTE ADULT - ASSESSMENT
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     EKG SR RBBB (old per family     1) CAD s/p CABG  -p/w chest pain. EKG non ischemic , trop -sera   - echo with normal lv and moderate AS   - c/w metoprolol   - chest pains  Trop mildly elevated and trending down likley sec to kidney disease,    -1/6 c/o of SOB  ?aspiration NGT in place. CXR Moderate bilateral pleural effusions f/u pulm recs also with anasarca s/p  IV lasix 40mg daily doing well can switch to 40mg PO daily      2) Covid +  - s/p remdesivir   - c/w supportive management   - t/t per primary team     3) Abd distension   -CTA AP obtained which showed  partially occlusive calcified and non-calcified plaque just distal to take-off of the SMA, with estimated at least 75% luminal narrowing. Limited evaluation of its distal branches. Patient taken emergently to OR on 12/24 s/p diagnostic laparoscopy and SMA stent placement with brachial cutdown  -Surgery/vascular on board , f/u recs  - HIDA scan + for acute asa, medical management as poor surgical candidate cont zosyn  - asa and Brilliant restarted      4) UGIB  -GI on board s/p  EGD 1/2/24 which revealed bleeding vessel (likely Dieulafoy) s/p clipping and NGT trauma s/p clipping, fundus not fully visualized 2/2 clot, minute Tushar III lesion in duodenum.   -on Protonix IV q 12

## 2024-01-12 NOTE — PROGRESS NOTE ADULT - SUBJECTIVE AND OBJECTIVE BOX
Date of Service  : 01-12-24     INTERVAL HPI/OVERNIGHT EVENTS: More sleepy today as did not sleep last night.   Vital Signs Last 24 Hrs  T(C): 36.4 (12 Jan 2024 17:19), Max: 37.2 (12 Jan 2024 10:14)  T(F): 97.5 (12 Jan 2024 17:19), Max: 98.9 (12 Jan 2024 10:14)  HR: 84 (12 Jan 2024 17:19) (76 - 86)  BP: 131/60 (12 Jan 2024 17:19) (115/46 - 139/56)  BP(mean): --  RR: 18 (12 Jan 2024 17:19) (17 - 18)  SpO2: 98% (12 Jan 2024 17:19) (98% - 100%)    Parameters below as of 12 Jan 2024 17:19  Patient On (Oxygen Delivery Method): nasal cannula  O2 Flow (L/min): 2    I&O's Summary    11 Jan 2024 07:01  -  12 Jan 2024 07:00  --------------------------------------------------------  IN: 0 mL / OUT: 1600 mL / NET: -1600 mL    12 Jan 2024 07:01  -  12 Jan 2024 18:46  --------------------------------------------------------  IN: 650 mL / OUT: 600 mL / NET: 50 mL      MEDICATIONS  (STANDING):  acetylcysteine 20%  Inhalation 4 milliLiter(s) Inhalation every 6 hours  albuterol/ipratropium for Nebulization 3 milliLiter(s) Nebulizer every 12 hours  amLODIPine   Tablet 5 milliGRAM(s) Oral daily  aspirin  chewable 81 milliGRAM(s) Oral daily  benzonatate 100 milliGRAM(s) Oral every 8 hours  escitalopram 10 milliGRAM(s) Oral daily  gabapentin Solution 100 milliGRAM(s) Oral two times a day  guaiFENesin ER 1200 milliGRAM(s) Oral every 12 hours  guaiFENesin Oral Liquid (Sugar-Free) 200 milliGRAM(s) Oral every 6 hours  heparin   Injectable 5000 Unit(s) SubCutaneous every 8 hours  insulin glargine Injectable (LANTUS) 14 Unit(s) SubCutaneous at bedtime  insulin lispro (ADMELOG) corrective regimen sliding scale   SubCutaneous at bedtime  insulin lispro (ADMELOG) corrective regimen sliding scale   SubCutaneous three times a day before meals  levETIRAcetam   Injectable 250 milliGRAM(s) IV Push every 12 hours  melatonin 3 milliGRAM(s) Oral at bedtime  memantine 5 milliGRAM(s) Oral two times a day  metoprolol tartrate Injectable 5 milliGRAM(s) IV Push every 6 hours  mometasone 110 MICROgram(s) Inhaler 2 Puff(s) Inhalation every 12 hours  pantoprazole  Injectable 40 milliGRAM(s) IV Push every 12 hours  ranolazine 500 milliGRAM(s) Oral two times a day  sucralfate 1 Gram(s) Oral every 12 hours  ticagrelor 60 milliGRAM(s) Enteral Tube every 12 hours    MEDICATIONS  (PRN):  acetaminophen   IVPB .. 1000 milliGRAM(s) IV Intermittent every 6 hours PRN Mild Pain (1 - 3), Moderate Pain (4 - 6)  albuterol/ipratropium for Nebulization 3 milliLiter(s) Nebulizer every 6 hours PRN Shortness of Breath and/or Wheezing  benzonatate 100 milliGRAM(s) Oral every 8 hours PRN Cough  guaiFENesin Oral Liquid (Sugar-Free) 100 milliGRAM(s) Oral every 6 hours PRN Cough    LABS:                        9.0    7.12  )-----------( 312      ( 12 Jan 2024 07:12 )             28.5     01-12    141  |  101  |  17  ----------------------------<  104<H>  3.7   |  30  |  1.48<H>    Ca    8.4      12 Jan 2024 07:12  Phos  2.8     01-12  Mg     1.60     01-12    TPro  6.2  /  Alb  2.6<L>  /  TBili  0.6  /  DBili  x   /  AST  18  /  ALT  16  /  AlkPhos  64  01-11      Urinalysis Basic - ( 12 Jan 2024 07:12 )    Color: x / Appearance: x / SG: x / pH: x  Gluc: 104 mg/dL / Ketone: x  / Bili: x / Urobili: x   Blood: x / Protein: x / Nitrite: x   Leuk Esterase: x / RBC: x / WBC x   Sq Epi: x / Non Sq Epi: x / Bacteria: x      CAPILLARY BLOOD GLUCOSE      POCT Blood Glucose.: 135 mg/dL (12 Jan 2024 17:50)  POCT Blood Glucose.: 87 mg/dL (12 Jan 2024 12:52)  POCT Blood Glucose.: 120 mg/dL (12 Jan 2024 08:48)  POCT Blood Glucose.: 179 mg/dL (11 Jan 2024 21:17)        Urinalysis Basic - ( 12 Jan 2024 07:12 )    Color: x / Appearance: x / SG: x / pH: x  Gluc: 104 mg/dL / Ketone: x  / Bili: x / Urobili: x   Blood: x / Protein: x / Nitrite: x   Leuk Esterase: x / RBC: x / WBC x   Sq Epi: x / Non Sq Epi: x / Bacteria: x          Consultant(s) Notes Reviewed:  [x ] YES  [ ] NO    PHYSICAL EXAM:  GENERAL: NAD, ,not in any distress ,  HEAD:  Atraumatic, Normocephalic  NECK: Supple, No JVD, Normal thyroid  NERVOUS SYSTEM:  Alert & No focal deficit   CHEST/LUNG: Good air entry bilateral with no  rales, rhonchi, wheezing, or rubs  HEART: Regular rate and rhythm; No murmurs, rubs, or gallops  ABDOMEN: Soft, Nontender, Nondistended; Bowel sounds present  EXTREMITIES:  2+ Peripheral Pulses, No clubbing, cyanosis, or edema      Care Discussed with Consultants/Other Providers [ x] YES  [ ] NO

## 2024-01-12 NOTE — PROGRESS NOTE ADULT - SUBJECTIVE AND OBJECTIVE BOX
Date of Service: 01-12-24 @ 13:58    Patient is a 89y old  Male who presents with a chief complaint of COVID19, Chest pain (12 Jan 2024 11:37)      Any change in ROS: seems to be doing  ok ; has some gurgling sound in his throat    MEDICATIONS  (STANDING):  acetylcysteine 20%  Inhalation 4 milliLiter(s) Inhalation every 6 hours  albuterol/ipratropium for Nebulization 3 milliLiter(s) Nebulizer every 12 hours  amLODIPine   Tablet 5 milliGRAM(s) Oral daily  aspirin  chewable 81 milliGRAM(s) Oral daily  benzonatate 100 milliGRAM(s) Oral every 8 hours  escitalopram 10 milliGRAM(s) Oral daily  gabapentin Solution 100 milliGRAM(s) Oral two times a day  guaiFENesin ER 1200 milliGRAM(s) Oral every 12 hours  guaiFENesin Oral Liquid (Sugar-Free) 200 milliGRAM(s) Oral every 6 hours  heparin   Injectable 5000 Unit(s) SubCutaneous every 8 hours  insulin glargine Injectable (LANTUS) 14 Unit(s) SubCutaneous at bedtime  insulin lispro (ADMELOG) corrective regimen sliding scale   SubCutaneous at bedtime  insulin lispro (ADMELOG) corrective regimen sliding scale   SubCutaneous three times a day before meals  levETIRAcetam   Injectable 250 milliGRAM(s) IV Push every 12 hours  melatonin 3 milliGRAM(s) Oral at bedtime  memantine 5 milliGRAM(s) Oral two times a day  metoprolol tartrate Injectable 5 milliGRAM(s) IV Push every 6 hours  mometasone 110 MICROgram(s) Inhaler 2 Puff(s) Inhalation every 12 hours  pantoprazole  Injectable 40 milliGRAM(s) IV Push every 12 hours  ranolazine 500 milliGRAM(s) Oral two times a day  sucralfate 1 Gram(s) Oral every 12 hours  ticagrelor 60 milliGRAM(s) Enteral Tube every 12 hours    MEDICATIONS  (PRN):  acetaminophen   IVPB .. 1000 milliGRAM(s) IV Intermittent every 6 hours PRN Mild Pain (1 - 3), Moderate Pain (4 - 6)  albuterol/ipratropium for Nebulization 3 milliLiter(s) Nebulizer every 6 hours PRN Shortness of Breath and/or Wheezing  benzonatate 100 milliGRAM(s) Oral every 8 hours PRN Cough  guaiFENesin Oral Liquid (Sugar-Free) 100 milliGRAM(s) Oral every 6 hours PRN Cough    Vital Signs Last 24 Hrs  T(C): 36.9 (12 Jan 2024 13:46), Max: 37.2 (12 Jan 2024 10:14)  T(F): 98.4 (12 Jan 2024 13:46), Max: 98.9 (12 Jan 2024 10:14)  HR: 77 (12 Jan 2024 13:46) (76 - 86)  BP: 124/49 (12 Jan 2024 13:46) (115/46 - 143/55)  BP(mean): --  RR: 18 (12 Jan 2024 13:46) (16 - 18)  SpO2: 98% (12 Jan 2024 13:46) (97% - 100%)    Parameters below as of 12 Jan 2024 13:46  Patient On (Oxygen Delivery Method): nasal cannula  O2 Flow (L/min): 2      I&O's Summary    11 Jan 2024 07:01  -  12 Jan 2024 07:00  --------------------------------------------------------  IN: 0 mL / OUT: 1600 mL / NET: -1600 mL    12 Jan 2024 07:01  -  12 Jan 2024 13:58  --------------------------------------------------------  IN: 450 mL / OUT: 400 mL / NET: 50 mL          Physical Exam:   GENERAL: NAD, well-groomed, well-developed  HEENT: JAVAD/   Atraumatic, Normocephalic  ENMT: No tonsillar erythema, exudates, or enlargement; Moist mucous membranes, Good dentition, No lesions  NECK: Supple, No JVD, Normal thyroid  CHEST/LUNG: Clear to auscultaion-  CVS: Regular rate and rhythm; No murmurs, rubs, or gallops  GI: : Soft, Nontender, Nondistended; Bowel sounds present  NERVOUS SYSTEM:  Alert & awake and responsive   EXTREMITIES:  2+ Peripheral Pulses, No clubbing, cyanosis, or edema  LYMPH: No lymphadenopathy noted  SKIN: No rashes or lesions  ENDOCRINOLOGY: No Thyromegaly  PSYCH: calm     Labs:                              9.0    7.12  )-----------( 312      ( 12 Jan 2024 07:12 )             28.5                         9.3    7.51  )-----------( 299      ( 11 Jan 2024 06:10 )             28.6                         8.7    7.26  )-----------( 274      ( 10 Dmitri 2024 06:40 )             27.4                         9.1    7.64  )-----------( 326      ( 09 Jan 2024 16:39 )             29.5                         8.0    6.48  )-----------( 284      ( 09 Jan 2024 08:45 )             25.7     01-12    141  |  101  |  17  ----------------------------<  104<H>  3.7   |  30  |  1.48<H>  01-11    140  |  102  |  20  ----------------------------<  197<H>  4.3   |  26  |  1.47<H>  01-10    142  |  105  |  20  ----------------------------<  144<H>  4.5   |  26  |  1.39<H>  01-09    140  |  108<H>  |  21  ----------------------------<  146<H>  5.3   |  25  |  1.33<H>    Ca    8.4      12 Jan 2024 07:12  Ca    8.2<L>      11 Jan 2024 06:10  Phos  2.8     01-12  Phos  2.7     01-11  Mg     1.60     01-12  Mg     1.70     01-11    TPro  6.2  /  Alb  2.6<L>  /  TBili  0.6  /  DBili  x   /  AST  18  /  ALT  16  /  AlkPhos  64  01-11  TPro  6.7  /  Alb  2.8<L>  /  TBili  0.5  /  DBili  x   /  AST  20  /  ALT  16  /  AlkPhos  73  01-10  TPro  6.0  /  Alb  2.8<L>  /  TBili  0.4  /  DBili  x   /  AST  16  /  ALT  18  /  AlkPhos  78  01-09    CAPILLARY BLOOD GLUCOSE      POCT Blood Glucose.: 87 mg/dL (12 Jan 2024 12:52)  POCT Blood Glucose.: 120 mg/dL (12 Jan 2024 08:48)  POCT Blood Glucose.: 179 mg/dL (11 Jan 2024 21:17)  POCT Blood Glucose.: 132 mg/dL (11 Jan 2024 17:54)      LIVER FUNCTIONS - ( 11 Jan 2024 06:10 )  Alb: 2.6 g/dL / Pro: 6.2 g/dL / ALK PHOS: 64 U/L / ALT: 16 U/L / AST: 18 U/L / GGT: x             Urinalysis Basic - ( 12 Jan 2024 07:12 )    Color: x / Appearance: x / SG: x / pH: x  Gluc: 104 mg/dL / Ketone: x  / Bili: x / Urobili: x   Blood: x / Protein: x / Nitrite: x   Leuk Esterase: x / RBC: x / WBC x   Sq Epi: x / Non Sq Epi: x / Bacteria: x    < from: Xray Chest 1 View- PORTABLE-Urgent (Xray Chest 1 View- PORTABLE-Urgent .) (01.09.24 @ 14:53) >    TECHNIQUE: Single frontal, portable view of the chest was obtained.    COMPARISON: Chest x-ray 1/6/2024.    FINDINGS:  Left chest wall pacemaker. Cardiac stent. Enteric tube with tip in the   distal stomach/proximal duodenum. Sternotomy wires.  The heart size is normal.  Slight increase in size of moderate bilateral pleural effusions.  No pneumothorax.    IMPRESSION:  Slight increase in size of moderate bilateral pleural effusions.    --- End of Report ---          FABRICE DUMONT MD; Resident Radiologist  This document has been electronically signed.  AMELIA ANGLIN MD; Attending Radiologist  This document has been electronically signed. Jan 9 2024  3:18PM    < end of copied text >          RECENT CULTURES:        RESPIRATORY CULTURES:          Studies  Chest X-RAY  CT SCAN Chest   Venous Dopplers: LE:   CT Abdomen  Others

## 2024-01-12 NOTE — PROGRESS NOTE ADULT - SUBJECTIVE AND OBJECTIVE BOX
Aashish Boyer MD  Interventional Cardiology / Advance Heart Failure and Cardiac Transplant Specialist  [place] Office : [address] [place] [address] [place] [address]  Tel:   [place] Office : [address] [facility] [place] [address]  Tel: [phone]       Pt is lying in bed comfortable not in distress s/p NG tube   	  MEDICATIONS:  amLODIPine   Tablet 5 milliGRAM(s) Oral daily  aspirin  chewable 81 milliGRAM(s) Oral daily  heparin   Injectable 5000 Unit(s) SubCutaneous every 8 hours  metoprolol tartrate Injectable 5 milliGRAM(s) IV Push every 6 hours  ranolazine 500 milliGRAM(s) Oral two times a day  ticagrelor 60 milliGRAM(s) Enteral Tube every 12 hours  acetylcysteine 20%  Inhalation 4 milliLiter(s) Inhalation every 6 hours  albuterol/ipratropium for Nebulization 3 milliLiter(s) Nebulizer every 12 hours  albuterol/ipratropium for Nebulization 3 milliLiter(s) Nebulizer every 6 hours PRN  benzonatate 100 milliGRAM(s) Oral every 8 hours PRN  benzonatate 100 milliGRAM(s) Oral every 8 hours  guaiFENesin ER 1200 milliGRAM(s) Oral every 12 hours  guaiFENesin Oral Liquid (Sugar-Free) 100 milliGRAM(s) Oral every 6 hours PRN  guaiFENesin Oral Liquid (Sugar-Free) 200 milliGRAM(s) Oral every 6 hours  mometasone 110 MICROgram(s) Inhaler 2 Puff(s) Inhalation every 12 hours  acetaminophen   IVPB .. 1000 milliGRAM(s) IV Intermittent every 6 hours PRN  escitalopram 10 milliGRAM(s) Oral daily  gabapentin Solution 100 milliGRAM(s) Oral two times a day  levETIRAcetam   Injectable 250 milliGRAM(s) IV Push every 12 hours  melatonin 3 milliGRAM(s) Oral at bedtime  memantine 5 milliGRAM(s) Oral two times a day  pantoprazole  Injectable 40 milliGRAM(s) IV Push every 12 hours  sucralfate 1 Gram(s) Oral every 12 hours  insulin glargine Injectable (LANTUS) 14 Unit(s) SubCutaneous at bedtime  insulin lispro (ADMELOG) corrective regimen sliding scale   SubCutaneous at bedtime  insulin lispro (ADMELOG) corrective regimen sliding scale   SubCutaneous three times a day before meals        PAST MEDICAL/SURGICAL HISTORY  PAST MEDICAL & SURGICAL HISTORY:  Hyperlipemia      Hypertension      Coronary Artery Disease      Diabetes Mellitus Type II      Stented Coronary Artery  total 5 stents, last stent 5/2019      Neuropathy      Myocardial infarction      Stroke  mild left facial numbness   no other residuals verbalized      Myoclonic jerking      Stage 3 chronic kidney disease      History of Cataract Extraction      Hx of CABG      H/O coronary angiogram      S/P coronary artery stent placement  1/6/09      S/P placement of cardiac pacemaker          SOCIAL HISTORY: Substance Use (street drugs): ( x ) never used  (  ) other:    FAMILY HISTORY:  No pertinent family history in first degree relatives         PHYSICAL EXAM:  T(C): 37.2 (01-12-24 @ 10:14), Max: 37.2 (01-12-24 @ 10:14)  HR: 76 (01-12-24 @ 10:14) (76 - 86)  BP: 115/46 (01-12-24 @ 10:14) (115/46 - 143/55)  RR: 18 (01-12-24 @ 10:14) (16 - 18)  SpO2: 99% (01-12-24 @ 10:14) (95% - 100%)  Wt(kg): --  I&O's Summary    11 Jan 2024 07:01  -  12 Jan 2024 07:00  --------------------------------------------------------  IN: 0 mL / OUT: 1600 mL / NET: -1600 mL    12 Jan 2024 07:01  -  12 Jan 2024 12:37  --------------------------------------------------------  IN: 200 mL / OUT: 400 mL / NET: -200 mL          GENERAL: NG tube   ENMT:   Moist mucous membranes, Good dentition, No lesions  Cardiovascular: Normal S1 S2, No JVD, No murmurs, No edema  Respiratory: b/l rhonchi   Gastrointestinal:  Soft, Non-tender, + BS	  Extremities: no edema                                    9.0    7.12  )-----------( 312      ( 12 Jan 2024 07:12 )             28.5     01-12    141  |  101  |  17  ----------------------------<  104<H>  3.7   |  30  |  1.48<H>    Ca    8.4      12 Jan 2024 07:12  Phos  2.8     01-12  Mg     1.60     01-12    TPro  6.2  /  Alb  2.6<L>  /  TBili  0.6  /  DBili  x   /  AST  18  /  ALT  16  /  AlkPhos  64  01-11    proBNP:   Lipid Profile:   HgA1c:   TSH:     Consultant(s) Notes Reviewed:  [x ] YES  [ ] NO    Care Discussed with Consultants/Other Providers [ x] YES  [ ] NO    Imaging Personally Reviewed independently:  [x] YES  [ ] NO    All labs, radiologic studies, vitals, orders and medications list reviewed. Patient is seen and examined at bedside. Case discussed with medical team.         Aashish Boyer MD  Interventional Cardiology / Advance Heart Failure and Cardiac Transplant Specialist  [place] Office : [address] [place] [address] [place] [address]  Tel:   [place] Office : [address] [facility] [place] [address]  Tel: [phone]       Pt is lying in bed comfortable not in distress s/p NG tube   	  MEDICATIONS:  amLODIPine   Tablet 5 milliGRAM(s) Oral daily  aspirin  chewable 81 milliGRAM(s) Oral daily  heparin   Injectable 5000 Unit(s) SubCutaneous every 8 hours  metoprolol tartrate Injectable 5 milliGRAM(s) IV Push every 6 hours  ranolazine 500 milliGRAM(s) Oral two times a day  ticagrelor 60 milliGRAM(s) Enteral Tube every 12 hours  acetylcysteine 20%  Inhalation 4 milliLiter(s) Inhalation every 6 hours  albuterol/ipratropium for Nebulization 3 milliLiter(s) Nebulizer every 12 hours  albuterol/ipratropium for Nebulization 3 milliLiter(s) Nebulizer every 6 hours PRN  benzonatate 100 milliGRAM(s) Oral every 8 hours PRN  benzonatate 100 milliGRAM(s) Oral every 8 hours  guaiFENesin ER 1200 milliGRAM(s) Oral every 12 hours  guaiFENesin Oral Liquid (Sugar-Free) 100 milliGRAM(s) Oral every 6 hours PRN  guaiFENesin Oral Liquid (Sugar-Free) 200 milliGRAM(s) Oral every 6 hours  mometasone 110 MICROgram(s) Inhaler 2 Puff(s) Inhalation every 12 hours  acetaminophen   IVPB .. 1000 milliGRAM(s) IV Intermittent every 6 hours PRN  escitalopram 10 milliGRAM(s) Oral daily  gabapentin Solution 100 milliGRAM(s) Oral two times a day  levETIRAcetam   Injectable 250 milliGRAM(s) IV Push every 12 hours  melatonin 3 milliGRAM(s) Oral at bedtime  memantine 5 milliGRAM(s) Oral two times a day  pantoprazole  Injectable 40 milliGRAM(s) IV Push every 12 hours  sucralfate 1 Gram(s) Oral every 12 hours  insulin glargine Injectable (LANTUS) 14 Unit(s) SubCutaneous at bedtime  insulin lispro (ADMELOG) corrective regimen sliding scale   SubCutaneous at bedtime  insulin lispro (ADMELOG) corrective regimen sliding scale   SubCutaneous three times a day before meals        PAST MEDICAL/SURGICAL HISTORY  PAST MEDICAL & SURGICAL HISTORY:  Hyperlipemia      Hypertension      Coronary Artery Disease      Diabetes Mellitus Type II      Stented Coronary Artery  total 5 stents, last stent 5/2019      Neuropathy      Myocardial infarction      Stroke  mild left facial numbness   no other residuals verbalized      Myoclonic jerking      Stage 3 chronic kidney disease      History of Cataract Extraction      Hx of CABG      H/O coronary angiogram      S/P coronary artery stent placement  1/6/09      S/P placement of cardiac pacemaker          SOCIAL HISTORY: Substance Use (street drugs): ( x ) never used  (  ) other:    FAMILY HISTORY:  No pertinent family history in first degree relatives         PHYSICAL EXAM:  T(C): 37.2 (01-12-24 @ 10:14), Max: 37.2 (01-12-24 @ 10:14)  HR: 76 (01-12-24 @ 10:14) (76 - 86)  BP: 115/46 (01-12-24 @ 10:14) (115/46 - 143/55)  RR: 18 (01-12-24 @ 10:14) (16 - 18)  SpO2: 99% (01-12-24 @ 10:14) (95% - 100%)  Wt(kg): --  I&O's Summary    11 Jan 2024 07:01  -  12 Jan 2024 07:00  --------------------------------------------------------  IN: 0 mL / OUT: 1600 mL / NET: -1600 mL    12 Jan 2024 07:01  -  12 Jan 2024 12:37  --------------------------------------------------------  IN: 200 mL / OUT: 400 mL / NET: -200 mL          GENERAL: NG tube   ENMT:   Moist mucous membranes, Good dentition, No lesions  Cardiovascular: Normal S1 S2, No JVD, No murmurs, No edema  Respiratory: b/l rhonchi   Gastrointestinal:  Soft, Non-tender, + BS	  Extremities: no edema                                    9.0    7.12  )-----------( 312      ( 12 Jan 2024 07:12 )             28.5     01-12    141  |  101  |  17  ----------------------------<  104<H>  3.7   |  30  |  1.48<H>    Ca    8.4      12 Jan 2024 07:12  Phos  2.8     01-12  Mg     1.60     01-12    TPro  6.2  /  Alb  2.6<L>  /  TBili  0.6  /  DBili  x   /  AST  18  /  ALT  16  /  AlkPhos  64  01-11    proBNP:   Lipid Profile:   HgA1c:   TSH:     Consultant(s) Notes Reviewed:  [x ] YES  [ ] NO    Care Discussed with Consultants/Other Providers [ x] YES  [ ] NO    Imaging Personally Reviewed independently:  [x] YES  [ ] NO    All labs, radiologic studies, vitals, orders and medications list reviewed. Patient is seen and examined at bedside. Case discussed with medical team.

## 2024-01-12 NOTE — PROGRESS NOTE ADULT - ASSESSMENT
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 Patient states that he has been having a dry cough for the past week, worsened over the past few days. Denies SOB with the cough, denies hemoptysis. Reports fevers at home to 101.5 with associated diaphoresis. Said that he has significant pinprick like b/l chest pain with his cough but denies any chest pain when not coughing or with ambulation. Also reports rhinorrhea and sore throat. Reports generalized weakness and poor appetite. States his daughter was visiting him over the week end last week and she was positive for COVID19. Patient tested positive for COVID19 2 days prior and was started on paxlovid as outpatient. Of note, family states that the patient has had worsening confusion at home over the past 6 months (becoming disoriented to time) but recently has been more confused, talking about seeing people that are not present.   On arrival to the ED his vitals were T 98 -> 99.2, P 80, /72, RR 18, ) sat 100% RA. His lab work showed leukocytosis with neutrophilia and bandemia, MABLE, mild hyponatremia, indeterminant but stable troponins, and elevated lactate to 2.3. His COVID19 swab was positive. His CXR showed bibasilar crackles.  (23 Dec 2023 22:51):  pt has been here for past two weeks and now pulm called fro excessive secretions   he is able to answer simple questions:  grand sons at bedside:     Covid / Resp failure/on 2 L of oxygen  :  CADS/P CABG  DM  CKD  HTN  CVA X 3    Covid / Resp failure/on 2 L of oxygen:  -he was treated for covid before:   -he has excessive secretions  -keep o2 sao2 above 90%  -add BD and mucomyst: ;  -add mucinex:   1/10: he seems to be doing  ok: cont mucomyst and bd   1/11 ?: add benzonatate and Robitussin prm : dc dornase  1/12: seems OK: no sob:  no cough : no phlegm : has gurgling sound in throat:  looks comfortable  CADS/P CABG  -cont current medications   DM  monitor and control   CKD  -cont current meds   HTN   -controlled  CVA X 3  -doing ok :     dw family  and team

## 2024-01-13 LAB
ANION GAP SERPL CALC-SCNC: 9 MMOL/L — SIGNIFICANT CHANGE UP (ref 7–14)
ANION GAP SERPL CALC-SCNC: 9 MMOL/L — SIGNIFICANT CHANGE UP (ref 7–14)
BUN SERPL-MCNC: 16 MG/DL — SIGNIFICANT CHANGE UP (ref 7–23)
BUN SERPL-MCNC: 16 MG/DL — SIGNIFICANT CHANGE UP (ref 7–23)
CALCIUM SERPL-MCNC: 8.4 MG/DL — SIGNIFICANT CHANGE UP (ref 8.4–10.5)
CALCIUM SERPL-MCNC: 8.4 MG/DL — SIGNIFICANT CHANGE UP (ref 8.4–10.5)
CHLORIDE SERPL-SCNC: 99 MMOL/L — SIGNIFICANT CHANGE UP (ref 98–107)
CHLORIDE SERPL-SCNC: 99 MMOL/L — SIGNIFICANT CHANGE UP (ref 98–107)
CO2 SERPL-SCNC: 30 MMOL/L — SIGNIFICANT CHANGE UP (ref 22–31)
CO2 SERPL-SCNC: 30 MMOL/L — SIGNIFICANT CHANGE UP (ref 22–31)
CREAT SERPL-MCNC: 1.58 MG/DL — HIGH (ref 0.5–1.3)
CREAT SERPL-MCNC: 1.58 MG/DL — HIGH (ref 0.5–1.3)
EGFR: 41 ML/MIN/1.73M2 — LOW
EGFR: 41 ML/MIN/1.73M2 — LOW
GLUCOSE BLDC GLUCOMTR-MCNC: 127 MG/DL — HIGH (ref 70–99)
GLUCOSE BLDC GLUCOMTR-MCNC: 127 MG/DL — HIGH (ref 70–99)
GLUCOSE BLDC GLUCOMTR-MCNC: 135 MG/DL — HIGH (ref 70–99)
GLUCOSE BLDC GLUCOMTR-MCNC: 135 MG/DL — HIGH (ref 70–99)
GLUCOSE BLDC GLUCOMTR-MCNC: 161 MG/DL — HIGH (ref 70–99)
GLUCOSE BLDC GLUCOMTR-MCNC: 161 MG/DL — HIGH (ref 70–99)
GLUCOSE BLDC GLUCOMTR-MCNC: 184 MG/DL — HIGH (ref 70–99)
GLUCOSE BLDC GLUCOMTR-MCNC: 184 MG/DL — HIGH (ref 70–99)
GLUCOSE SERPL-MCNC: 103 MG/DL — HIGH (ref 70–99)
GLUCOSE SERPL-MCNC: 103 MG/DL — HIGH (ref 70–99)
MAGNESIUM SERPL-MCNC: 1.6 MG/DL — SIGNIFICANT CHANGE UP (ref 1.6–2.6)
MAGNESIUM SERPL-MCNC: 1.6 MG/DL — SIGNIFICANT CHANGE UP (ref 1.6–2.6)
PHOSPHATE SERPL-MCNC: 2.6 MG/DL — SIGNIFICANT CHANGE UP (ref 2.5–4.5)
PHOSPHATE SERPL-MCNC: 2.6 MG/DL — SIGNIFICANT CHANGE UP (ref 2.5–4.5)
POTASSIUM SERPL-MCNC: 3.5 MMOL/L — SIGNIFICANT CHANGE UP (ref 3.5–5.3)
POTASSIUM SERPL-MCNC: 3.5 MMOL/L — SIGNIFICANT CHANGE UP (ref 3.5–5.3)
POTASSIUM SERPL-SCNC: 3.5 MMOL/L — SIGNIFICANT CHANGE UP (ref 3.5–5.3)
POTASSIUM SERPL-SCNC: 3.5 MMOL/L — SIGNIFICANT CHANGE UP (ref 3.5–5.3)
SODIUM SERPL-SCNC: 138 MMOL/L — SIGNIFICANT CHANGE UP (ref 135–145)
SODIUM SERPL-SCNC: 138 MMOL/L — SIGNIFICANT CHANGE UP (ref 135–145)

## 2024-01-13 RX ORDER — ACETAMINOPHEN 500 MG
650 TABLET ORAL EVERY 6 HOURS
Refills: 0 | Status: DISCONTINUED | OUTPATIENT
Start: 2024-01-13 | End: 2024-01-24

## 2024-01-13 RX ORDER — METOPROLOL TARTRATE 50 MG
25 TABLET ORAL
Refills: 0 | Status: DISCONTINUED | OUTPATIENT
Start: 2024-01-13 | End: 2024-01-23

## 2024-01-13 RX ORDER — ASPIRIN/CALCIUM CARB/MAGNESIUM 324 MG
81 TABLET ORAL DAILY
Refills: 0 | Status: DISCONTINUED | OUTPATIENT
Start: 2024-01-13 | End: 2024-01-24

## 2024-01-13 RX ORDER — TICAGRELOR 90 MG/1
60 TABLET ORAL EVERY 12 HOURS
Refills: 0 | Status: DISCONTINUED | OUTPATIENT
Start: 2024-01-13 | End: 2024-01-24

## 2024-01-13 RX ADMIN — Medication 81 MILLIGRAM(S): at 18:05

## 2024-01-13 RX ADMIN — RANOLAZINE 500 MILLIGRAM(S): 500 TABLET, FILM COATED, EXTENDED RELEASE ORAL at 06:35

## 2024-01-13 RX ADMIN — PANTOPRAZOLE SODIUM 40 MILLIGRAM(S): 20 TABLET, DELAYED RELEASE ORAL at 06:35

## 2024-01-13 RX ADMIN — Medication 3 MILLILITER(S): at 09:11

## 2024-01-13 RX ADMIN — HEPARIN SODIUM 5000 UNIT(S): 5000 INJECTION INTRAVENOUS; SUBCUTANEOUS at 13:19

## 2024-01-13 RX ADMIN — AMLODIPINE BESYLATE 5 MILLIGRAM(S): 2.5 TABLET ORAL at 06:38

## 2024-01-13 RX ADMIN — GABAPENTIN 100 MILLIGRAM(S): 400 CAPSULE ORAL at 06:36

## 2024-01-13 RX ADMIN — TICAGRELOR 60 MILLIGRAM(S): 90 TABLET ORAL at 18:06

## 2024-01-13 RX ADMIN — LEVETIRACETAM 250 MILLIGRAM(S): 250 TABLET, FILM COATED ORAL at 18:11

## 2024-01-13 RX ADMIN — TICAGRELOR 60 MILLIGRAM(S): 90 TABLET ORAL at 06:52

## 2024-01-13 RX ADMIN — Medication 1 GRAM(S): at 18:04

## 2024-01-13 RX ADMIN — Medication 25 MILLIGRAM(S): at 18:02

## 2024-01-13 RX ADMIN — RANOLAZINE 500 MILLIGRAM(S): 500 TABLET, FILM COATED, EXTENDED RELEASE ORAL at 18:05

## 2024-01-13 RX ADMIN — Medication 3 MILLILITER(S): at 21:39

## 2024-01-13 RX ADMIN — GABAPENTIN 100 MILLIGRAM(S): 400 CAPSULE ORAL at 18:02

## 2024-01-13 RX ADMIN — Medication 4 MILLILITER(S): at 09:11

## 2024-01-13 RX ADMIN — HEPARIN SODIUM 5000 UNIT(S): 5000 INJECTION INTRAVENOUS; SUBCUTANEOUS at 06:37

## 2024-01-13 RX ADMIN — Medication 1 GRAM(S): at 06:35

## 2024-01-13 RX ADMIN — Medication 650 MILLIGRAM(S): at 19:30

## 2024-01-13 RX ADMIN — MEMANTINE HYDROCHLORIDE 5 MILLIGRAM(S): 10 TABLET ORAL at 06:37

## 2024-01-13 RX ADMIN — Medication 4 MILLILITER(S): at 21:42

## 2024-01-13 RX ADMIN — Medication 25 MILLIGRAM(S): at 06:35

## 2024-01-13 RX ADMIN — MOMETASONE FUROATE 2 PUFF(S): 220 INHALANT RESPIRATORY (INHALATION) at 13:17

## 2024-01-13 RX ADMIN — LEVETIRACETAM 250 MILLIGRAM(S): 250 TABLET, FILM COATED ORAL at 06:52

## 2024-01-13 RX ADMIN — MOMETASONE FUROATE 2 PUFF(S): 220 INHALANT RESPIRATORY (INHALATION) at 22:24

## 2024-01-13 RX ADMIN — INSULIN GLARGINE 14 UNIT(S): 100 INJECTION, SOLUTION SUBCUTANEOUS at 21:45

## 2024-01-13 RX ADMIN — ESCITALOPRAM OXALATE 10 MILLIGRAM(S): 10 TABLET, FILM COATED ORAL at 18:04

## 2024-01-13 RX ADMIN — Medication 4 MILLILITER(S): at 04:06

## 2024-01-13 RX ADMIN — Medication 3 MILLIGRAM(S): at 21:46

## 2024-01-13 RX ADMIN — HEPARIN SODIUM 5000 UNIT(S): 5000 INJECTION INTRAVENOUS; SUBCUTANEOUS at 21:46

## 2024-01-13 RX ADMIN — Medication 100 MILLIGRAM(S): at 06:36

## 2024-01-13 RX ADMIN — PANTOPRAZOLE SODIUM 40 MILLIGRAM(S): 20 TABLET, DELAYED RELEASE ORAL at 18:03

## 2024-01-13 RX ADMIN — MEMANTINE HYDROCHLORIDE 5 MILLIGRAM(S): 10 TABLET ORAL at 18:04

## 2024-01-13 RX ADMIN — Medication 2: at 18:03

## 2024-01-13 NOTE — PROGRESS NOTE ADULT - SUBJECTIVE AND OBJECTIVE BOX
Aashish Boyer MD  Interventional Cardiology / Advance Heart Failure and Cardiac Transplant Specialist  [place] Office : [address] [place] [address] [place] [address]  Tel:   [place] Office : [address] [facility] [place] [address]  Tel: [phone]       Pt is lying in bed comfortable not in distress, no chest pains no SOB no palpitations  	  MEDICATIONS:  amLODIPine   Tablet 5 milliGRAM(s) Oral daily  aspirin  chewable 81 milliGRAM(s) Oral daily  heparin   Injectable 5000 Unit(s) SubCutaneous every 8 hours  metoprolol tartrate 25 milliGRAM(s) Oral two times a day  ranolazine 500 milliGRAM(s) Oral two times a day  ticagrelor 60 milliGRAM(s) Oral every 12 hours  acetylcysteine 20%  Inhalation 4 milliLiter(s) Inhalation every 6 hours  albuterol/ipratropium for Nebulization 3 milliLiter(s) Nebulizer every 12 hours  albuterol/ipratropium for Nebulization 3 milliLiter(s) Nebulizer every 6 hours PRN  benzonatate 100 milliGRAM(s) Oral every 8 hours  benzonatate 100 milliGRAM(s) Oral every 8 hours PRN  guaiFENesin ER 1200 milliGRAM(s) Oral every 12 hours  guaiFENesin Oral Liquid (Sugar-Free) 100 milliGRAM(s) Oral every 6 hours PRN  guaiFENesin Oral Liquid (Sugar-Free) 200 milliGRAM(s) Oral every 6 hours  mometasone 110 MICROgram(s) Inhaler 2 Puff(s) Inhalation every 12 hours    acetaminophen     Tablet .. 650 milliGRAM(s) Oral every 6 hours PRN  escitalopram 10 milliGRAM(s) Oral daily  gabapentin Solution 100 milliGRAM(s) Oral two times a day  levETIRAcetam   Injectable 250 milliGRAM(s) IV Push every 12 hours  melatonin 3 milliGRAM(s) Oral at bedtime  memantine 5 milliGRAM(s) Oral two times a day    pantoprazole  Injectable 40 milliGRAM(s) IV Push every 12 hours  sucralfate 1 Gram(s) Oral every 12 hours    insulin glargine Injectable (LANTUS) 14 Unit(s) SubCutaneous at bedtime  insulin lispro (ADMELOG) corrective regimen sliding scale   SubCutaneous at bedtime  insulin lispro (ADMELOG) corrective regimen sliding scale   SubCutaneous three times a day before meals        PAST MEDICAL/SURGICAL HISTORY  PAST MEDICAL & SURGICAL HISTORY:  Hyperlipemia      Hypertension      Coronary Artery Disease      Diabetes Mellitus Type II      Stented Coronary Artery  total 5 stents, last stent 5/2019      Neuropathy      Myocardial infarction      Stroke  mild left facial numbness   no other residuals verbalized      Myoclonic jerking      Stage 3 chronic kidney disease      History of Cataract Extraction      Hx of CABG      H/O coronary angiogram      S/P coronary artery stent placement  1/6/09      S/P placement of cardiac pacemaker          SOCIAL HISTORY: Substance Use (street drugs): ( x ) never used  (  ) other:    FAMILY HISTORY:  No pertinent family history in first degree relatives         PHYSICAL EXAM:  T(C): 36.8 (01-13-24 @ 11:29), Max: 36.9 (01-12-24 @ 21:29)  HR: 80 (01-13-24 @ 11:29) (79 - 85)  BP: 100/58 (01-13-24 @ 11:29) (100/58 - 131/60)  RR: 17 (01-13-24 @ 11:29) (16 - 18)  SpO2: 98% (01-13-24 @ 11:29) (95% - 98%)  Wt(kg): --  I&O's Summary    12 Jan 2024 07:01  -  13 Jan 2024 07:00  --------------------------------------------------------  IN: 650 mL / OUT: 700 mL / NET: -50 mL             EYES:   PERRLA   ENMT:   Moist mucous membranes, Good dentition, No lesions  Cardiovascular: Normal S1 S2, No JVD, No murmurs, No edema  Respiratory: b/l rhonchi   Gastrointestinal:  Soft, Non-tender, + BS	  Extremities: no edema                                    9.0    7.12  )-----------( 312      ( 12 Jan 2024 07:12 )             28.5     01-13    138  |  99  |  16  ----------------------------<  103<H>  3.5   |  30  |  1.58<H>    Ca    8.4      13 Jan 2024 06:20  Phos  2.6     01-13  Mg     1.60     01-13      proBNP:   Lipid Profile:   HgA1c:   TSH:     Consultant(s) Notes Reviewed:  [x ] YES  [ ] NO    Care Discussed with Consultants/Other Providers [ x] YES  [ ] NO    Imaging Personally Reviewed independently:  [x] YES  [ ] NO    All labs, radiologic studies, vitals, orders and medications list reviewed. Patient is seen and examined at bedside. Case discussed with medical team.         Aashish Boyer MD  Interventional Cardiology / Advance Heart Failure and Cardiac Transplant Specialist  [place] Office : [address] [place] [address] [place] [address]  Tel:   [place] Office : [address] [facility] [place] [address]  Tel: [phone]       Pt is lying in bed comfortable not in distress, no chest pains no SOB no palpitations  	  MEDICATIONS:  amLODIPine   Tablet 5 milliGRAM(s) Oral daily  aspirin  chewable 81 milliGRAM(s) Oral daily  heparin   Injectable 5000 Unit(s) SubCutaneous every 8 hours  metoprolol tartrate 25 milliGRAM(s) Oral two times a day  ranolazine 500 milliGRAM(s) Oral two times a day  ticagrelor 60 milliGRAM(s) Oral every 12 hours  acetylcysteine 20%  Inhalation 4 milliLiter(s) Inhalation every 6 hours  albuterol/ipratropium for Nebulization 3 milliLiter(s) Nebulizer every 12 hours  albuterol/ipratropium for Nebulization 3 milliLiter(s) Nebulizer every 6 hours PRN  benzonatate 100 milliGRAM(s) Oral every 8 hours  benzonatate 100 milliGRAM(s) Oral every 8 hours PRN  guaiFENesin ER 1200 milliGRAM(s) Oral every 12 hours  guaiFENesin Oral Liquid (Sugar-Free) 100 milliGRAM(s) Oral every 6 hours PRN  guaiFENesin Oral Liquid (Sugar-Free) 200 milliGRAM(s) Oral every 6 hours  mometasone 110 MICROgram(s) Inhaler 2 Puff(s) Inhalation every 12 hours    acetaminophen     Tablet .. 650 milliGRAM(s) Oral every 6 hours PRN  escitalopram 10 milliGRAM(s) Oral daily  gabapentin Solution 100 milliGRAM(s) Oral two times a day  levETIRAcetam   Injectable 250 milliGRAM(s) IV Push every 12 hours  melatonin 3 milliGRAM(s) Oral at bedtime  memantine 5 milliGRAM(s) Oral two times a day    pantoprazole  Injectable 40 milliGRAM(s) IV Push every 12 hours  sucralfate 1 Gram(s) Oral every 12 hours    insulin glargine Injectable (LANTUS) 14 Unit(s) SubCutaneous at bedtime  insulin lispro (ADMELOG) corrective regimen sliding scale   SubCutaneous at bedtime  insulin lispro (ADMELOG) corrective regimen sliding scale   SubCutaneous three times a day before meals        PAST MEDICAL/SURGICAL HISTORY  PAST MEDICAL & SURGICAL HISTORY:  Hyperlipemia      Hypertension      Coronary Artery Disease      Diabetes Mellitus Type II      Stented Coronary Artery  total 5 stents, last stent 5/2019      Neuropathy      Myocardial infarction      Stroke  mild left facial numbness   no other residuals verbalized      Myoclonic jerking      Stage 3 chronic kidney disease      History of Cataract Extraction      Hx of CABG      H/O coronary angiogram      S/P coronary artery stent placement  1/6/09      S/P placement of cardiac pacemaker          SOCIAL HISTORY: Substance Use (street drugs): ( x ) never used  (  ) other:    FAMILY HISTORY:  No pertinent family history in first degree relatives         PHYSICAL EXAM:  T(C): 36.8 (01-13-24 @ 11:29), Max: 36.9 (01-12-24 @ 21:29)  HR: 80 (01-13-24 @ 11:29) (79 - 85)  BP: 100/58 (01-13-24 @ 11:29) (100/58 - 131/60)  RR: 17 (01-13-24 @ 11:29) (16 - 18)  SpO2: 98% (01-13-24 @ 11:29) (95% - 98%)  Wt(kg): --  I&O's Summary    12 Jan 2024 07:01  -  13 Jan 2024 07:00  --------------------------------------------------------  IN: 650 mL / OUT: 700 mL / NET: -50 mL             EYES:   PERRLA   ENMT:   Moist mucous membranes, Good dentition, No lesions  Cardiovascular: Normal S1 S2, No JVD, No murmurs, No edema  Respiratory: b/l rhonchi   Gastrointestinal:  Soft, Non-tender, + BS	  Extremities: no edema                                    9.0    7.12  )-----------( 312      ( 12 Jan 2024 07:12 )             28.5     01-13    138  |  99  |  16  ----------------------------<  103<H>  3.5   |  30  |  1.58<H>    Ca    8.4      13 Jan 2024 06:20  Phos  2.6     01-13  Mg     1.60     01-13      proBNP:   Lipid Profile:   HgA1c:   TSH:     Consultant(s) Notes Reviewed:  [x ] YES  [ ] NO    Care Discussed with Consultants/Other Providers [ x] YES  [ ] NO    Imaging Personally Reviewed independently:  [x] YES  [ ] NO    All labs, radiologic studies, vitals, orders and medications list reviewed. Patient is seen and examined at bedside. Case discussed with medical team.

## 2024-01-13 NOTE — PROGRESS NOTE ADULT - SUBJECTIVE AND OBJECTIVE BOX
Date of Service  : 01-13-24     INTERVAL HPI/OVERNIGHT EVENTS: More awake and communicative now.   Vital Signs Last 24 Hrs  T(C): 36.6 (13 Jan 2024 18:03), Max: 36.9 (12 Jan 2024 21:29)  T(F): 97.8 (13 Jan 2024 18:03), Max: 98.5 (12 Jan 2024 21:29)  HR: 83 (13 Jan 2024 18:03) (79 - 85)  BP: 139/55 (13 Jan 2024 18:03) (100/58 - 139/55)  BP(mean): --  RR: 16 (13 Jan 2024 18:03) (16 - 18)  SpO2: 98% (13 Jan 2024 18:03) (95% - 98%)    Parameters below as of 13 Jan 2024 18:03  Patient On (Oxygen Delivery Method): nasal cannula      I&O's Summary    12 Jan 2024 07:01  -  13 Jan 2024 07:00  --------------------------------------------------------  IN: 650 mL / OUT: 700 mL / NET: -50 mL      MEDICATIONS  (STANDING):  acetylcysteine 20%  Inhalation 4 milliLiter(s) Inhalation every 6 hours  albuterol/ipratropium for Nebulization 3 milliLiter(s) Nebulizer every 12 hours  amLODIPine   Tablet 5 milliGRAM(s) Oral daily  aspirin  chewable 81 milliGRAM(s) Oral daily  benzonatate 100 milliGRAM(s) Oral every 8 hours  escitalopram 10 milliGRAM(s) Oral daily  gabapentin Solution 100 milliGRAM(s) Oral two times a day  guaiFENesin ER 1200 milliGRAM(s) Oral every 12 hours  guaiFENesin Oral Liquid (Sugar-Free) 200 milliGRAM(s) Oral every 6 hours  heparin   Injectable 5000 Unit(s) SubCutaneous every 8 hours  insulin glargine Injectable (LANTUS) 14 Unit(s) SubCutaneous at bedtime  insulin lispro (ADMELOG) corrective regimen sliding scale   SubCutaneous at bedtime  insulin lispro (ADMELOG) corrective regimen sliding scale   SubCutaneous three times a day before meals  levETIRAcetam   Injectable 250 milliGRAM(s) IV Push every 12 hours  melatonin 3 milliGRAM(s) Oral at bedtime  memantine 5 milliGRAM(s) Oral two times a day  metoprolol tartrate 25 milliGRAM(s) Oral two times a day  mometasone 110 MICROgram(s) Inhaler 2 Puff(s) Inhalation every 12 hours  pantoprazole  Injectable 40 milliGRAM(s) IV Push every 12 hours  ranolazine 500 milliGRAM(s) Oral two times a day  sucralfate 1 Gram(s) Oral every 12 hours  ticagrelor 60 milliGRAM(s) Oral every 12 hours    MEDICATIONS  (PRN):  acetaminophen     Tablet .. 650 milliGRAM(s) Oral every 6 hours PRN Temp greater or equal to 38C (100.4F), Mild Pain (1 - 3), Moderate Pain (4 - 6)  albuterol/ipratropium for Nebulization 3 milliLiter(s) Nebulizer every 6 hours PRN Shortness of Breath and/or Wheezing  benzonatate 100 milliGRAM(s) Oral every 8 hours PRN Cough  guaiFENesin Oral Liquid (Sugar-Free) 100 milliGRAM(s) Oral every 6 hours PRN Cough    LABS:                        9.0    7.12  )-----------( 312      ( 12 Jan 2024 07:12 )             28.5     01-13    138  |  99  |  16  ----------------------------<  103<H>  3.5   |  30  |  1.58<H>    Ca    8.4      13 Jan 2024 06:20  Phos  2.6     01-13  Mg     1.60     01-13        Urinalysis Basic - ( 13 Jan 2024 06:20 )    Color: x / Appearance: x / SG: x / pH: x  Gluc: 103 mg/dL / Ketone: x  / Bili: x / Urobili: x   Blood: x / Protein: x / Nitrite: x   Leuk Esterase: x / RBC: x / WBC x   Sq Epi: x / Non Sq Epi: x / Bacteria: x      CAPILLARY BLOOD GLUCOSE      POCT Blood Glucose.: 161 mg/dL (13 Jan 2024 17:38)  POCT Blood Glucose.: 135 mg/dL (13 Jan 2024 12:38)  POCT Blood Glucose.: 127 mg/dL (13 Jan 2024 09:17)  POCT Blood Glucose.: 131 mg/dL (12 Jan 2024 21:36)        Urinalysis Basic - ( 13 Jan 2024 06:20 )    Color: x / Appearance: x / SG: x / pH: x  Gluc: 103 mg/dL / Ketone: x  / Bili: x / Urobili: x   Blood: x / Protein: x / Nitrite: x   Leuk Esterase: x / RBC: x / WBC x   Sq Epi: x / Non Sq Epi: x / Bacteria: x      REVIEW OF SYSTEMS:  CONSTITUTIONAL: No fever, weight loss, or fatigue  EYES: No eye pain, visual disturbances, or discharge  ENMT:  No difficulty hearing, tinnitus, vertigo; No sinus or throat pain  NECK: No pain or stiffness  RESPIRATORY: No cough, wheezing, chills or hemoptysis; No shortness of breath  CARDIOVASCULAR: No chest pain, palpitations, dizziness, or leg swelling  GASTROINTESTINAL: No abdominal or epigastric pain. No nausea, vomiting, or hematemesis; No diarrhea or constipation. No melena or hematochezia.  GENITOURINARY: No dysuria, frequency, hematuria, or incontinence      RADIOLOGY & ADDITIONAL TESTS:    Consultant(s) Notes Reviewed:  [x ] YES  [ ] NO    PHYSICAL EXAM:  GENERAL: Nnot in any distress ,  HEAD:  Atraumatic, Normocephalic  NECK: Supple, No JVD, Normal thyroid  NERVOUS SYSTEM:  Alert & Oriented X3, No focal deficit   CHEST/LUNG: Good air entry bilateral with no  rales, rhonchi, wheezing, or rubs  HEART: Regular rate and rhythm; No murmurs, rubs, or gallops  ABDOMEN: Soft, Nontender, Nondistended; Bowel sounds present  EXTREMITIES:  2+ Peripheral Pulses, No clubbing, cyanosis, or edema    Care Discussed with Consultants/Other Providers [ x] YES  [ ] NO

## 2024-01-13 NOTE — PROGRESS NOTE ADULT - ASSESSMENT
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 CTA demonstrating mesenteric fat stranding associated with ascending/transverse colon. S/p SMA angiography with stent placement with diagnostic laparoscopy 12/24, small bowel and visible colon viable, some inflammation of omentum in RUQ. Course c/b GI bleed, s/p scope on 1/2 with GI with clips placed. Patient transferred overnight from SICU to the floor in clinically stable condition.       Problem/Recommendation - 1:  ·  Problem: Type 2 diabetes mellitus with hyperglycemia.   ·  Recommendation: sugars better now.   Continue Lantus and SSI.     Problem/Recommendation - 2:  ·  Problem: Acute renal failure superimposed on chronic kidney disease.   ·  Recommendation: Creatinine stable.      Problem/Recommendation - 3:  ·  Problem: CAD (coronary artery disease).   ·  Recommendation: S/P CABG and Stents. On DAPT and BB.  cardiology help appreciated.     Problem/Recommendation - 4:  ·  Problem: Essential hypertension.   ·  Recommendation: BP meds with hold parameters.     Problem/Recommendation - 5:  ·  Problem: GI bleed.   ·  Recommendation: On PPI.  S/P EGD.   Impression:          - NG tube removed before endoscopy                       - LA Grade B esophagitis.                       - Bleeding Dieulafoy's lesion of the posterior wall of                        the gastric body. This is likely the source of                        clinically significant bleeding. Hemostasis achieved                        using 2 MR conditional hemoclips.                       - Bleeding edematous mucosa and nonbleeding clean based                        gastric ulcers in the posterior wall of the gastric                        body. These are likely due to NG tube irritation.                        Hemostasis achieved using 1 MR conditional hemoclip.                       - Non-bleeding small clean based duodenal ulcer                       - No specimens collected.    < end of copied text >.     Problem/Recommendation - 6:  ·  Problem: Myoclonic jerking.   ·  Recommendation: On keppra.     Problem/Recommendation - 7:  ·  Problem: Dysphagia.   ·  Recommendation:  S/P  cineesophagogram r.  Puree diet     Problem/Recommendation - 8:  ·  Problem: Abdominal distension.   ·  Recommendation: S/P  SMA angiography with stent placement with diagnostic laparoscopy 12/24,  Vascular following.     Problem/Recommendation - 9:  ·  Problem: Anemia due to acute blood loss.   ·  Recommendation: S/P PRBC.   HH stable.       Problem/Recommendation - 10:  ·  Problem: Toxic metabolic encephalopathy.   ·  Recommendation: Mental status waxes and wanes .     Problem/Recommendation - 11:  ·  Problem: 2019 novel coronavirus disease (COVID-19).   ·  Recommendation: S/P Renmdisvir.     Problem/Recommendation - 12:  ·  Problem: Leg pain  with edema    ·  Recommendation:  Diuretics,  Doppler negative for  DVT.  On Gabapentin     Problem/Recommendation - 13:  ·  Problem: Persistent Cough.   ·  Recommendation: Improving.   Cough suppressants .  Pulmonary helping       D/W patient Son and grand son in detail . D/W ACP .  Dispo : DC planning to [facility].                  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 CTA demonstrating mesenteric fat stranding associated with ascending/transverse colon. S/p SMA angiography with stent placement with diagnostic laparoscopy 12/24, small bowel and visible colon viable, some inflammation of omentum in RUQ. Course c/b GI bleed, s/p scope on 1/2 with GI with clips placed. Patient transferred overnight from SICU to the floor in clinically stable condition.       Problem/Recommendation - 1:  ·  Problem: Type 2 diabetes mellitus with hyperglycemia.   ·  Recommendation: sugars better now.   Continue Lantus and SSI.     Problem/Recommendation - 2:  ·  Problem: Acute renal failure superimposed on chronic kidney disease.   ·  Recommendation: Creatinine stable.      Problem/Recommendation - 3:  ·  Problem: CAD (coronary artery disease).   ·  Recommendation: S/P CABG and Stents. On DAPT and BB.  cardiology help appreciated.     Problem/Recommendation - 4:  ·  Problem: Essential hypertension.   ·  Recommendation: BP meds with hold parameters.     Problem/Recommendation - 5:  ·  Problem: GI bleed.   ·  Recommendation: On PPI.  S/P EGD.   Impression:          - NG tube removed before endoscopy                       - LA Grade B esophagitis.                       - Bleeding Dieulafoy's lesion of the posterior wall of                        the gastric body. This is likely the source of                        clinically significant bleeding. Hemostasis achieved                        using 2 MR conditional hemoclips.                       - Bleeding edematous mucosa and nonbleeding clean based                        gastric ulcers in the posterior wall of the gastric                        body. These are likely due to NG tube irritation.                        Hemostasis achieved using 1 MR conditional hemoclip.                       - Non-bleeding small clean based duodenal ulcer                       - No specimens collected.    < end of copied text >.     Problem/Recommendation - 6:  ·  Problem: Myoclonic jerking.   ·  Recommendation: On keppra.     Problem/Recommendation - 7:  ·  Problem: Dysphagia.   ·  Recommendation:  S/P  cineesophagogram r.  Puree diet     Problem/Recommendation - 8:  ·  Problem: Abdominal distension.   ·  Recommendation: S/P  SMA angiography with stent placement with diagnostic laparoscopy 12/24,  Vascular following.     Problem/Recommendation - 9:  ·  Problem: Anemia due to acute blood loss.   ·  Recommendation: S/P PRBC.   HH stable.       Problem/Recommendation - 10:  ·  Problem: Toxic metabolic encephalopathy.   ·  Recommendation: Mental status waxes and wanes .     Problem/Recommendation - 11:  ·  Problem: 2019 novel coronavirus disease (COVID-19).   ·  Recommendation: S/P Renmdisvir.     Problem/Recommendation - 12:  ·  Problem: Leg pain  with edema    ·  Recommendation:  Diuretics,  Doppler negative for  DVT.  On Gabapentin     Problem/Recommendation - 13:  ·  Problem: Persistent Cough.   ·  Recommendation: Improving.   Cough suppressants .  Pulmonary helping       D/W patient Son and grand son in detail . D/W ACP .  Dispo : DC planning to [facility].

## 2024-01-14 LAB
BASE EXCESS BLDV CALC-SCNC: 11.9 MMOL/L — HIGH (ref -2–3)
BASE EXCESS BLDV CALC-SCNC: 11.9 MMOL/L — HIGH (ref -2–3)
CA-I SERPL-SCNC: 1.17 MMOL/L — SIGNIFICANT CHANGE UP (ref 1.15–1.33)
CA-I SERPL-SCNC: 1.17 MMOL/L — SIGNIFICANT CHANGE UP (ref 1.15–1.33)
CHLORIDE BLDV-SCNC: 101 MMOL/L — SIGNIFICANT CHANGE UP (ref 96–108)
CHLORIDE BLDV-SCNC: 101 MMOL/L — SIGNIFICANT CHANGE UP (ref 96–108)
CO2 BLDV-SCNC: 38.7 MMOL/L — HIGH (ref 22–26)
CO2 BLDV-SCNC: 38.7 MMOL/L — HIGH (ref 22–26)
GAS PNL BLDV: 136 MMOL/L — SIGNIFICANT CHANGE UP (ref 136–145)
GAS PNL BLDV: 136 MMOL/L — SIGNIFICANT CHANGE UP (ref 136–145)
GAS PNL BLDV: SIGNIFICANT CHANGE UP
GAS PNL BLDV: SIGNIFICANT CHANGE UP
GLUCOSE BLDC GLUCOMTR-MCNC: 142 MG/DL — HIGH (ref 70–99)
GLUCOSE BLDC GLUCOMTR-MCNC: 142 MG/DL — HIGH (ref 70–99)
GLUCOSE BLDC GLUCOMTR-MCNC: 150 MG/DL — HIGH (ref 70–99)
GLUCOSE BLDC GLUCOMTR-MCNC: 150 MG/DL — HIGH (ref 70–99)
GLUCOSE BLDC GLUCOMTR-MCNC: 152 MG/DL — HIGH (ref 70–99)
GLUCOSE BLDC GLUCOMTR-MCNC: 152 MG/DL — HIGH (ref 70–99)
GLUCOSE BLDC GLUCOMTR-MCNC: 188 MG/DL — HIGH (ref 70–99)
GLUCOSE BLDC GLUCOMTR-MCNC: 188 MG/DL — HIGH (ref 70–99)
GLUCOSE BLDC GLUCOMTR-MCNC: 228 MG/DL — HIGH (ref 70–99)
GLUCOSE BLDC GLUCOMTR-MCNC: 228 MG/DL — HIGH (ref 70–99)
GLUCOSE BLDV-MCNC: 162 MG/DL — HIGH (ref 70–99)
GLUCOSE BLDV-MCNC: 162 MG/DL — HIGH (ref 70–99)
HCO3 BLDV-SCNC: 37 MMOL/L — HIGH (ref 22–29)
HCO3 BLDV-SCNC: 37 MMOL/L — HIGH (ref 22–29)
HCT VFR BLDA CALC: 30 % — LOW (ref 39–51)
HCT VFR BLDA CALC: 30 % — LOW (ref 39–51)
HGB BLD CALC-MCNC: 9.9 G/DL — LOW (ref 12.6–17.4)
HGB BLD CALC-MCNC: 9.9 G/DL — LOW (ref 12.6–17.4)
LACTATE BLDV-MCNC: 1.5 MMOL/L — SIGNIFICANT CHANGE UP (ref 0.5–2)
LACTATE BLDV-MCNC: 1.5 MMOL/L — SIGNIFICANT CHANGE UP (ref 0.5–2)
PCO2 BLDV: 51 MMHG — SIGNIFICANT CHANGE UP (ref 42–55)
PCO2 BLDV: 51 MMHG — SIGNIFICANT CHANGE UP (ref 42–55)
PH BLDV: 7.47 — HIGH (ref 7.32–7.43)
PH BLDV: 7.47 — HIGH (ref 7.32–7.43)
PO2 BLDV: 35 MMHG — SIGNIFICANT CHANGE UP (ref 25–45)
PO2 BLDV: 35 MMHG — SIGNIFICANT CHANGE UP (ref 25–45)
POTASSIUM BLDV-SCNC: 3.7 MMOL/L — SIGNIFICANT CHANGE UP (ref 3.5–5.1)
POTASSIUM BLDV-SCNC: 3.7 MMOL/L — SIGNIFICANT CHANGE UP (ref 3.5–5.1)
SAO2 % BLDV: 49 % — LOW (ref 67–88)
SAO2 % BLDV: 49 % — LOW (ref 67–88)

## 2024-01-14 RX ADMIN — Medication 100 MILLIGRAM(S): at 00:41

## 2024-01-14 RX ADMIN — Medication 3 MILLIGRAM(S): at 22:16

## 2024-01-14 RX ADMIN — MOMETASONE FUROATE 2 PUFF(S): 220 INHALANT RESPIRATORY (INHALATION) at 22:15

## 2024-01-14 RX ADMIN — PANTOPRAZOLE SODIUM 40 MILLIGRAM(S): 20 TABLET, DELAYED RELEASE ORAL at 17:55

## 2024-01-14 RX ADMIN — MEMANTINE HYDROCHLORIDE 5 MILLIGRAM(S): 10 TABLET ORAL at 17:59

## 2024-01-14 RX ADMIN — TICAGRELOR 60 MILLIGRAM(S): 90 TABLET ORAL at 05:33

## 2024-01-14 RX ADMIN — HEPARIN SODIUM 5000 UNIT(S): 5000 INJECTION INTRAVENOUS; SUBCUTANEOUS at 22:15

## 2024-01-14 RX ADMIN — Medication 3 MILLILITER(S): at 21:31

## 2024-01-14 RX ADMIN — TICAGRELOR 60 MILLIGRAM(S): 90 TABLET ORAL at 17:54

## 2024-01-14 RX ADMIN — HEPARIN SODIUM 5000 UNIT(S): 5000 INJECTION INTRAVENOUS; SUBCUTANEOUS at 12:58

## 2024-01-14 RX ADMIN — Medication 4: at 17:57

## 2024-01-14 RX ADMIN — Medication 25 MILLIGRAM(S): at 05:13

## 2024-01-14 RX ADMIN — GABAPENTIN 100 MILLIGRAM(S): 400 CAPSULE ORAL at 05:14

## 2024-01-14 RX ADMIN — Medication 4 MILLILITER(S): at 03:58

## 2024-01-14 RX ADMIN — GABAPENTIN 100 MILLIGRAM(S): 400 CAPSULE ORAL at 17:58

## 2024-01-14 RX ADMIN — Medication 81 MILLIGRAM(S): at 18:00

## 2024-01-14 RX ADMIN — PANTOPRAZOLE SODIUM 40 MILLIGRAM(S): 20 TABLET, DELAYED RELEASE ORAL at 05:12

## 2024-01-14 RX ADMIN — ESCITALOPRAM OXALATE 10 MILLIGRAM(S): 10 TABLET, FILM COATED ORAL at 17:59

## 2024-01-14 RX ADMIN — LEVETIRACETAM 250 MILLIGRAM(S): 250 TABLET, FILM COATED ORAL at 05:15

## 2024-01-14 RX ADMIN — MOMETASONE FUROATE 2 PUFF(S): 220 INHALANT RESPIRATORY (INHALATION) at 12:56

## 2024-01-14 RX ADMIN — Medication 25 MILLIGRAM(S): at 18:00

## 2024-01-14 RX ADMIN — Medication 1 GRAM(S): at 05:13

## 2024-01-14 RX ADMIN — INSULIN GLARGINE 14 UNIT(S): 100 INJECTION, SOLUTION SUBCUTANEOUS at 22:14

## 2024-01-14 RX ADMIN — RANOLAZINE 500 MILLIGRAM(S): 500 TABLET, FILM COATED, EXTENDED RELEASE ORAL at 17:58

## 2024-01-14 RX ADMIN — AMLODIPINE BESYLATE 5 MILLIGRAM(S): 2.5 TABLET ORAL at 05:14

## 2024-01-14 RX ADMIN — Medication 2: at 12:57

## 2024-01-14 RX ADMIN — MEMANTINE HYDROCHLORIDE 5 MILLIGRAM(S): 10 TABLET ORAL at 05:13

## 2024-01-14 RX ADMIN — Medication 1 GRAM(S): at 17:55

## 2024-01-14 RX ADMIN — Medication 4 MILLILITER(S): at 11:37

## 2024-01-14 RX ADMIN — LEVETIRACETAM 250 MILLIGRAM(S): 250 TABLET, FILM COATED ORAL at 17:53

## 2024-01-14 RX ADMIN — RANOLAZINE 500 MILLIGRAM(S): 500 TABLET, FILM COATED, EXTENDED RELEASE ORAL at 05:13

## 2024-01-14 RX ADMIN — HEPARIN SODIUM 5000 UNIT(S): 5000 INJECTION INTRAVENOUS; SUBCUTANEOUS at 05:13

## 2024-01-14 RX ADMIN — Medication 3 MILLILITER(S): at 10:22

## 2024-01-14 NOTE — PROGRESS NOTE ADULT - ASSESSMENT
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 Patient states that he has been having a dry cough for the past week, worsened over the past few days. Denies SOB with the cough, denies hemoptysis. Reports fevers at home to 101.5 with associated diaphoresis. Said that he has significant pinprick like b/l chest pain with his cough but denies any chest pain when not coughing or with ambulation. Also reports rhinorrhea and sore throat. Reports generalized weakness and poor appetite. States his daughter was visiting him over the week end last week and she was positive for COVID19. Patient tested positive for COVID19 2 days prior and was started on paxlovid as outpatient. Of note, family states that the patient has had worsening confusion at home over the past 6 months (becoming disoriented to time) but recently has been more confused, talking about seeing people that are not present.   On arrival to the ED his vitals were T 98 -> 99.2, P 80, /72, RR 18, ) sat 100% RA. His lab work showed leukocytosis with neutrophilia and bandemia, MABLE, mild hyponatremia, indeterminant but stable troponins, and elevated lactate to 2.3. His COVID19 swab was positive. His CXR showed bibasilar crackles.  (23 Dec 2023 22:51):  pt has been here for past two weeks and now pulm called fro excessive secretions   he is able to answer simple questions:  grand sons at bedside:     Covid / Resp failure/on 2 L of oxygen  :  CADS/P CABG  DM  CKD  HTN  CVA X 3    Covid / Resp failure/on 2 L of oxygen:  -he was treated for covid before:   -he has excessive secretions  -keep o2 sao2 above 90%  -add BD and mucomyst: ;  -add mucinex:   1/10: he seems to be doing  ok: cont mucomyst and bd   1/11 ?: add benzonatate and Robitussin prm : dc dornase  1/12: seems OK: no sob:  no cough : no phlegm : has gurgling sound in throat:  looks comfortable  1/14: he is on room air:  with good sao2:  finished covid rx:  cont current rx:  high risk for aspiration as he has gurgling secretions in throat   CADS/P CABG  -cont current medications   DM  monitor and control   CKD  -cont current meds   HTN   -controlled  CVA X 3  -doing ok :     dw family  and team  GI Bleed:   -secondary to Dieulafoy's lesion:  defer to primary team :    dvt prophylaxis     paged acp

## 2024-01-14 NOTE — PROGRESS NOTE ADULT - ASSESSMENT
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     EKG SR RBBB (old per family     1) CAD s/p CABG  -p/w chest pain. EKG non ischemic , trop -sera   - echo with normal lv and moderate AS   - c/w metoprolol   - chest pains  Trop mildly elevated and trending down likley sec to kidney disease,    -1/6 c/o of SOB  ?aspiration NGT in place. CXR Moderate bilateral pleural effusions f/u pulm recs also with anasarca s/p  IV lasix     2) Covid +  - s/p remdesivir   - c/w supportive management   - t/t per primary team     3) Abd distension   -CTA AP obtained which showed  partially occlusive calcified and non-calcified plaque just distal to take-off of the SMA, with estimated at least 75% luminal narrowing. Limited evaluation of its distal branches. Patient taken emergently to OR on 12/24 s/p diagnostic laparoscopy and SMA stent placement with brachial cutdown  -Surgery/vascular on board , f/u recs  - HIDA scan + for acute asa, medical management as poor surgical candidate cont zosyn  - asa and Brilliant restarted      4) UGIB  -GI on board s/p  EGD 1/2/24 which revealed bleeding vessel (likely Dieulafoy) s/p clipping and NGT trauma s/p clipping, fundus not fully visualized 2/2 clot, minute Tushar III lesion in duodenum.   -on Protonix IV q 12

## 2024-01-14 NOTE — PROGRESS NOTE ADULT - SUBJECTIVE AND OBJECTIVE BOX
Date of Service: 01-14-24 @ 10:54    Patient is a 89y old  Male who presents with a chief complaint of COVID19, Chest pain (13 Jan 2024 11:05)      Any change in ROS:  doing  ok : still has gurgling sound in his throat:  grand sone at bedside:     MEDICATIONS  (STANDING):  acetylcysteine 20%  Inhalation 4 milliLiter(s) Inhalation every 6 hours  albuterol/ipratropium for Nebulization 3 milliLiter(s) Nebulizer every 12 hours  amLODIPine   Tablet 5 milliGRAM(s) Oral daily  aspirin  chewable 81 milliGRAM(s) Oral daily  benzonatate 100 milliGRAM(s) Oral every 8 hours  escitalopram 10 milliGRAM(s) Oral daily  gabapentin Solution 100 milliGRAM(s) Oral two times a day  guaiFENesin ER 1200 milliGRAM(s) Oral every 12 hours  guaiFENesin Oral Liquid (Sugar-Free) 200 milliGRAM(s) Oral every 6 hours  heparin   Injectable 5000 Unit(s) SubCutaneous every 8 hours  insulin glargine Injectable (LANTUS) 14 Unit(s) SubCutaneous at bedtime  insulin lispro (ADMELOG) corrective regimen sliding scale   SubCutaneous at bedtime  insulin lispro (ADMELOG) corrective regimen sliding scale   SubCutaneous three times a day before meals  levETIRAcetam   Injectable 250 milliGRAM(s) IV Push every 12 hours  melatonin 3 milliGRAM(s) Oral at bedtime  memantine 5 milliGRAM(s) Oral two times a day  metoprolol tartrate 25 milliGRAM(s) Oral two times a day  mometasone 110 MICROgram(s) Inhaler 2 Puff(s) Inhalation every 12 hours  pantoprazole  Injectable 40 milliGRAM(s) IV Push every 12 hours  ranolazine 500 milliGRAM(s) Oral two times a day  sucralfate 1 Gram(s) Oral every 12 hours  ticagrelor 60 milliGRAM(s) Oral every 12 hours    MEDICATIONS  (PRN):  acetaminophen     Tablet .. 650 milliGRAM(s) Oral every 6 hours PRN Temp greater or equal to 38C (100.4F), Mild Pain (1 - 3), Moderate Pain (4 - 6)  albuterol/ipratropium for Nebulization 3 milliLiter(s) Nebulizer every 6 hours PRN Shortness of Breath and/or Wheezing  benzonatate 100 milliGRAM(s) Oral every 8 hours PRN Cough  guaiFENesin Oral Liquid (Sugar-Free) 100 milliGRAM(s) Oral every 6 hours PRN Cough    Vital Signs Last 24 Hrs  T(C): 36.4 (14 Jan 2024 10:26), Max: 37.1 (13 Jan 2024 20:57)  T(F): 97.5 (14 Jan 2024 10:26), Max: 98.7 (13 Jan 2024 20:57)  HR: 80 (14 Jan 2024 10:26) (74 - 83)  BP: 125/75 (14 Jan 2024 10:26) (100/58 - 139/55)  BP(mean): --  RR: 18 (14 Jan 2024 10:26) (16 - 18)  SpO2: 98% (14 Jan 2024 10:26) (95% - 98%)    Parameters below as of 14 Jan 2024 10:26  Patient On (Oxygen Delivery Method): room air        I&O's Summary        Physical Exam:   GENERAL: NAD, well-groomed, well-developed  HEENT: JAVAD/   Atraumatic, Normocephalic  ENMT: No tonsillar erythema, exudates, or enlargement; Moist mucous membranes, Good dentition, No lesions  NECK: Supple, No JVD, Normal thyroid  CHEST/LUNG: Clear to auscultaion- no wheezing  CVS: Regular rate and rhythm; No murmurs, rubs, or gallops  GI: : Soft, Nontender, Nondistended; Bowel sounds present  NERVOUS SYSTEM:  Alert & Oriented X3  EXTREMITIES:  - edema  LYMPH: No lymphadenopathy noted  SKIN: No rashes or lesions  ENDOCRINOLOGY: No Thyromegaly  PSYCHcalm     Labs:                              9.0    7.12  )-----------( 312      ( 12 Jan 2024 07:12 )             28.5                         9.3    7.51  )-----------( 299      ( 11 Jan 2024 06:10 )             28.6     01-13    138  |  99  |  16  ----------------------------<  103<H>  3.5   |  30  |  1.58<H>  01-12    141  |  101  |  17  ----------------------------<  104<H>  3.7   |  30  |  1.48<H>  01-11    140  |  102  |  20  ----------------------------<  197<H>  4.3   |  26  |  1.47<H>    Ca    8.4      13 Jan 2024 06:20  Phos  2.6     01-13  Mg     1.60     01-13    TPro  6.2  /  Alb  2.6<L>  /  TBili  0.6  /  DBili  x   /  AST  18  /  ALT  16  /  AlkPhos  64  01-11    CAPILLARY BLOOD GLUCOSE      POCT Blood Glucose.: 142 mg/dL (14 Jan 2024 09:12)  POCT Blood Glucose.: 184 mg/dL (13 Jan 2024 21:18)  POCT Blood Glucose.: 161 mg/dL (13 Jan 2024 17:38)  POCT Blood Glucose.: 135 mg/dL (13 Jan 2024 12:38)          Urinalysis Basic - ( 13 Jan 2024 06:20 )    Color: x / Appearance: x / SG: x / pH: x  Gluc: 103 mg/dL / Ketone: x  / Bili: x / Urobili: x   Blood: x / Protein: x / Nitrite: x   Leuk Esterase: x / RBC: x / WBC x   Sq Epi: x / Non Sq Epi: x / Bacteria: x      rad< from: Xray Chest 1 View- PORTABLE-Urgent (Xray Chest 1 View- PORTABLE-Urgent .) (01.09.24 @ 14:53) >    ACC: [id number] EXAM:  XR CHEST PORTABLE URGENT 1V   ORDERED BY: EDWARD MARINO     PROCEDURE DATE:  01/09/2024          INTERPRETATION:  CLINICAL INDICATION: Abnormal Chest Sounds    TECHNIQUE: Single frontal, portable view of the chest was obtained.    COMPARISON: Chest x-ray 1/6/2024.    FINDINGS:  Left chest wall pacemaker. Cardiac stent. Enteric tube with tip in the   distal stomach/proximal duodenum. Sternotomy wires.  The heart size is normal.  Slight increase in size of moderate bilateral pleural effusions.  No pneumothorax.    IMPRESSION:  Slight increase in size of moderate bilateral pleural effusions.    --- End of Report ---          FABRICE DUMONT MD; Resident Radiologist  This document has been electronically signed.  AMELIA ANGLIN MD; Attending Radiologist  This document has been electronically signed. Jan 9 2024  3:18PM    < end of copied text >        RECENT CULTURES:    < from: TTE W or WO Ultrasound Enhancing Agent (12.25.23 @ 11:09) >                      enhancing agent.  Study Information:  Image quality for this study is technically difficult.    _______________________________________________________________________________________     CONCLUSIONS:      1. Technically difficult image quality.   2. Left ventricular cavity is normal. Left ventricular wallthickness is normal. Left ventricular systolic function is normal with an ejection fraction of 62 % by Florence's method of disks. There are no regional wall motion abnormalities seen.   3. Normal right ventricular cavity size and probably normal systolic function.   4. Structurally normal mitral valve with normal leaflet excursion. There is calcification of the mitral valve annulus. There is mild mitral regurgitation.   5. Aortic valve leaflet morphology not well visualized. with reduced systolicexcursion. There is calcification of the aortic valve leaflets. The peak transaortic velocity is 2.47 m/s, peak transaortic gradient is 24.4 mmHg and mean transaortic gradient is 14.0 mmHg with an LVOT/aortic valve VTI ratio of 0.20. Probable mild tomoderate aortic stenosis. There is mild aortic regurgitation.   6. No prior echocardiogram is available for comparison.    _____________________________________________________________________    < end of copied text >      RESPIRATORY CULTURES:          Studies  Chest X-RAY  CT SCAN Chest   Venous Dopplers: LE:   CT Abdomen  Others               Date of Service: 01-14-24 @ 10:54    Patient is a 89y old  Male who presents with a chief complaint of COVID19, Chest pain (13 Jan 2024 11:05)      Any change in ROS:  doing  ok : still has gurgling sound in his throat:  grand sone at bedside:     MEDICATIONS  (STANDING):  acetylcysteine 20%  Inhalation 4 milliLiter(s) Inhalation every 6 hours  albuterol/ipratropium for Nebulization 3 milliLiter(s) Nebulizer every 12 hours  amLODIPine   Tablet 5 milliGRAM(s) Oral daily  aspirin  chewable 81 milliGRAM(s) Oral daily  benzonatate 100 milliGRAM(s) Oral every 8 hours  escitalopram 10 milliGRAM(s) Oral daily  gabapentin Solution 100 milliGRAM(s) Oral two times a day  guaiFENesin ER 1200 milliGRAM(s) Oral every 12 hours  guaiFENesin Oral Liquid (Sugar-Free) 200 milliGRAM(s) Oral every 6 hours  heparin   Injectable 5000 Unit(s) SubCutaneous every 8 hours  insulin glargine Injectable (LANTUS) 14 Unit(s) SubCutaneous at bedtime  insulin lispro (ADMELOG) corrective regimen sliding scale   SubCutaneous at bedtime  insulin lispro (ADMELOG) corrective regimen sliding scale   SubCutaneous three times a day before meals  levETIRAcetam   Injectable 250 milliGRAM(s) IV Push every 12 hours  melatonin 3 milliGRAM(s) Oral at bedtime  memantine 5 milliGRAM(s) Oral two times a day  metoprolol tartrate 25 milliGRAM(s) Oral two times a day  mometasone 110 MICROgram(s) Inhaler 2 Puff(s) Inhalation every 12 hours  pantoprazole  Injectable 40 milliGRAM(s) IV Push every 12 hours  ranolazine 500 milliGRAM(s) Oral two times a day  sucralfate 1 Gram(s) Oral every 12 hours  ticagrelor 60 milliGRAM(s) Oral every 12 hours    MEDICATIONS  (PRN):  acetaminophen     Tablet .. 650 milliGRAM(s) Oral every 6 hours PRN Temp greater or equal to 38C (100.4F), Mild Pain (1 - 3), Moderate Pain (4 - 6)  albuterol/ipratropium for Nebulization 3 milliLiter(s) Nebulizer every 6 hours PRN Shortness of Breath and/or Wheezing  benzonatate 100 milliGRAM(s) Oral every 8 hours PRN Cough  guaiFENesin Oral Liquid (Sugar-Free) 100 milliGRAM(s) Oral every 6 hours PRN Cough    Vital Signs Last 24 Hrs  T(C): 36.4 (14 Jan 2024 10:26), Max: 37.1 (13 Jan 2024 20:57)  T(F): 97.5 (14 Jan 2024 10:26), Max: 98.7 (13 Jan 2024 20:57)  HR: 80 (14 Jan 2024 10:26) (74 - 83)  BP: 125/75 (14 Jan 2024 10:26) (100/58 - 139/55)  BP(mean): --  RR: 18 (14 Jan 2024 10:26) (16 - 18)  SpO2: 98% (14 Jan 2024 10:26) (95% - 98%)    Parameters below as of 14 Jan 2024 10:26  Patient On (Oxygen Delivery Method): room air        I&O's Summary        Physical Exam:   GENERAL: NAD, well-groomed, well-developed  HEENT: JAVAD/   Atraumatic, Normocephalic  ENMT: No tonsillar erythema, exudates, or enlargement; Moist mucous membranes, Good dentition, No lesions  NECK: Supple, No JVD, Normal thyroid  CHEST/LUNG: Clear to auscultaion- no wheezing  CVS: Regular rate and rhythm; No murmurs, rubs, or gallops  GI: : Soft, Nontender, Nondistended; Bowel sounds present  NERVOUS SYSTEM:  Alert & Oriented X3  EXTREMITIES:  - edema  LYMPH: No lymphadenopathy noted  SKIN: No rashes or lesions  ENDOCRINOLOGY: No Thyromegaly  PSYCHcalm     Labs:                              9.0    7.12  )-----------( 312      ( 12 Jan 2024 07:12 )             28.5                         9.3    7.51  )-----------( 299      ( 11 Jan 2024 06:10 )             28.6     01-13    138  |  99  |  16  ----------------------------<  103<H>  3.5   |  30  |  1.58<H>  01-12    141  |  101  |  17  ----------------------------<  104<H>  3.7   |  30  |  1.48<H>  01-11    140  |  102  |  20  ----------------------------<  197<H>  4.3   |  26  |  1.47<H>    Ca    8.4      13 Jan 2024 06:20  Phos  2.6     01-13  Mg     1.60     01-13    TPro  6.2  /  Alb  2.6<L>  /  TBili  0.6  /  DBili  x   /  AST  18  /  ALT  16  /  AlkPhos  64  01-11    CAPILLARY BLOOD GLUCOSE      POCT Blood Glucose.: 142 mg/dL (14 Jan 2024 09:12)  POCT Blood Glucose.: 184 mg/dL (13 Jan 2024 21:18)  POCT Blood Glucose.: 161 mg/dL (13 Jan 2024 17:38)  POCT Blood Glucose.: 135 mg/dL (13 Jan 2024 12:38)          Urinalysis Basic - ( 13 Jan 2024 06:20 )    Color: x / Appearance: x / SG: x / pH: x  Gluc: 103 mg/dL / Ketone: x  / Bili: x / Urobili: x   Blood: x / Protein: x / Nitrite: x   Leuk Esterase: x / RBC: x / WBC x   Sq Epi: x / Non Sq Epi: x / Bacteria: x      rad< from: Xray Chest 1 View- PORTABLE-Urgent (Xray Chest 1 View- PORTABLE-Urgent .) (01.09.24 @ 14:53) >    ACC: [id number] EXAM:  XR CHEST PORTABLE URGENT 1V   ORDERED BY: EDWARD MARINO     PROCEDURE DATE:  01/09/2024          INTERPRETATION:  CLINICAL INDICATION: Abnormal Chest Sounds    TECHNIQUE: Single frontal, portable view of the chest was obtained.    COMPARISON: Chest x-ray 1/6/2024.    FINDINGS:  Left chest wall pacemaker. Cardiac stent. Enteric tube with tip in the   distal stomach/proximal duodenum. Sternotomy wires.  The heart size is normal.  Slight increase in size of moderate bilateral pleural effusions.  No pneumothorax.    IMPRESSION:  Slight increase in size of moderate bilateral pleural effusions.    --- End of Report ---          FABRICE DUMONT MD; Resident Radiologist  This document has been electronically signed.  AMELIA ANGLIN MD; Attending Radiologist  This document has been electronically signed. Jan 9 2024  3:18PM    < end of copied text >        RECENT CULTURES:    < from: TTE W or WO Ultrasound Enhancing Agent (12.25.23 @ 11:09) >                      enhancing agent.  Study Information:  Image quality for this study is technically difficult.    _______________________________________________________________________________________     CONCLUSIONS:      1. Technically difficult image quality.   2. Left ventricular cavity is normal. Left ventricular wallthickness is normal. Left ventricular systolic function is normal with an ejection fraction of 62 % by Florence's method of disks. There are no regional wall motion abnormalities seen.   3. Normal right ventricular cavity size and probably normal systolic function.   4. Structurally normal mitral valve with normal leaflet excursion. There is calcification of the mitral valve annulus. There is mild mitral regurgitation.   5. Aortic valve leaflet morphology not well visualized. with reduced systolicexcursion. There is calcification of the aortic valve leaflets. The peak transaortic velocity is 2.47 m/s, peak transaortic gradient is 24.4 mmHg and mean transaortic gradient is 14.0 mmHg with an LVOT/aortic valve VTI ratio of 0.20. Probable mild tomoderate aortic stenosis. There is mild aortic regurgitation.   6. No prior echocardiogram is available for comparison.    _____________________________________________________________________    < end of copied text >      RESPIRATORY CULTURES:          Studies  Chest X-RAY  CT SCAN Chest   Venous Dopplers: LE:   CT Abdomen  Others

## 2024-01-14 NOTE — PROGRESS NOTE ADULT - SUBJECTIVE AND OBJECTIVE BOX
Aashish Boyer MD  Interventional Cardiology / Advance Heart Failure and Cardiac Transplant Specialist  [place] Office : [address] [place] [address] [place] [address]  Tel:   [place] Office : [address] [facility] [place] [address]  Tel: [phone]       Pt is lying in bed comfortable not in distress   	  MEDICATIONS:  amLODIPine   Tablet 5 milliGRAM(s) Oral daily  aspirin  chewable 81 milliGRAM(s) Oral daily  heparin   Injectable 5000 Unit(s) SubCutaneous every 8 hours  metoprolol tartrate 25 milliGRAM(s) Oral two times a day  ranolazine 500 milliGRAM(s) Oral two times a day  ticagrelor 60 milliGRAM(s) Oral every 12 hours    acetylcysteine 20%  Inhalation 4 milliLiter(s) Inhalation every 6 hours  albuterol/ipratropium for Nebulization 3 milliLiter(s) Nebulizer every 12 hours  albuterol/ipratropium for Nebulization 3 milliLiter(s) Nebulizer every 6 hours PRN  benzonatate 100 milliGRAM(s) Oral every 8 hours  guaiFENesin ER 1200 milliGRAM(s) Oral every 12 hours  guaiFENesin Oral Liquid (Sugar-Free) 100 milliGRAM(s) Oral every 6 hours PRN  guaiFENesin Oral Liquid (Sugar-Free) 200 milliGRAM(s) Oral every 6 hours  mometasone 110 MICROgram(s) Inhaler 2 Puff(s) Inhalation every 12 hours    acetaminophen     Tablet .. 650 milliGRAM(s) Oral every 6 hours PRN  escitalopram 10 milliGRAM(s) Oral daily  gabapentin Solution 100 milliGRAM(s) Oral two times a day  levETIRAcetam   Injectable 250 milliGRAM(s) IV Push every 12 hours  melatonin 3 milliGRAM(s) Oral at bedtime  memantine 5 milliGRAM(s) Oral two times a day    pantoprazole  Injectable 40 milliGRAM(s) IV Push every 12 hours  sucralfate 1 Gram(s) Oral every 12 hours    insulin glargine Injectable (LANTUS) 14 Unit(s) SubCutaneous at bedtime  insulin lispro (ADMELOG) corrective regimen sliding scale   SubCutaneous at bedtime  insulin lispro (ADMELOG) corrective regimen sliding scale   SubCutaneous three times a day before meals        PAST MEDICAL/SURGICAL HISTORY  PAST MEDICAL & SURGICAL HISTORY:  Hyperlipemia      Hypertension      Coronary Artery Disease      Diabetes Mellitus Type II      Stented Coronary Artery  total 5 stents, last stent 5/2019      Neuropathy      Myocardial infarction      Stroke  mild left facial numbness   no other residuals verbalized      Myoclonic jerking      Stage 3 chronic kidney disease      History of Cataract Extraction      Hx of CABG      H/O coronary angiogram      S/P coronary artery stent placement  1/6/09      S/P placement of cardiac pacemaker          SOCIAL HISTORY: Substance Use (street drugs): ( x ) never used  (  ) other:    FAMILY HISTORY:  No pertinent family history in first degree relatives         PHYSICAL EXAM:  T(C): 36.4 (01-14-24 @ 10:26), Max: 37.1 (01-13-24 @ 20:57)  HR: 80 (01-14-24 @ 10:26) (74 - 83)  BP: 125/75 (01-14-24 @ 10:26) (125/75 - 139/55)  RR: 18 (01-14-24 @ 10:26) (16 - 18)  SpO2: 98% (01-14-24 @ 10:26) (95% - 98%)  Wt(kg): --  I&O's Summary           EYES:   PERRLA   ENMT:   Moist mucous membranes, Good dentition, No lesions  Cardiovascular: Normal S1 S2, No JVD, No murmurs, No edema  Respiratory: b/l rhonchi   Gastrointestinal:  Soft, Non-tender, + BS	  Extremities: no edema                01-13    138  |  99  |  16  ----------------------------<  103<H>  3.5   |  30  |  1.58<H>    Ca    8.4      13 Jan 2024 06:20  Phos  2.6     01-13  Mg     1.60     01-13      proBNP:   Lipid Profile:   HgA1c:   TSH:     Consultant(s) Notes Reviewed:  [x ] YES  [ ] NO    Care Discussed with Consultants/Other Providers [ x] YES  [ ] NO    Imaging Personally Reviewed independently:  [x] YES  [ ] NO    All labs, radiologic studies, vitals, orders and medications list reviewed. Patient is seen and examined at bedside. Case discussed with medical team.         Aashish Boyer MD  Interventional Cardiology / Advance Heart Failure and Cardiac Transplant Specialist  [place] Office : [address] [place] [address] [place] [address]  Tel:   [place] Office : [address] [facility] [place] [address]  Tel: [phone]       Pt is lying in bed comfortable not in distress   	  MEDICATIONS:  amLODIPine   Tablet 5 milliGRAM(s) Oral daily  aspirin  chewable 81 milliGRAM(s) Oral daily  heparin   Injectable 5000 Unit(s) SubCutaneous every 8 hours  metoprolol tartrate 25 milliGRAM(s) Oral two times a day  ranolazine 500 milliGRAM(s) Oral two times a day  ticagrelor 60 milliGRAM(s) Oral every 12 hours    acetylcysteine 20%  Inhalation 4 milliLiter(s) Inhalation every 6 hours  albuterol/ipratropium for Nebulization 3 milliLiter(s) Nebulizer every 12 hours  albuterol/ipratropium for Nebulization 3 milliLiter(s) Nebulizer every 6 hours PRN  benzonatate 100 milliGRAM(s) Oral every 8 hours  guaiFENesin ER 1200 milliGRAM(s) Oral every 12 hours  guaiFENesin Oral Liquid (Sugar-Free) 100 milliGRAM(s) Oral every 6 hours PRN  guaiFENesin Oral Liquid (Sugar-Free) 200 milliGRAM(s) Oral every 6 hours  mometasone 110 MICROgram(s) Inhaler 2 Puff(s) Inhalation every 12 hours    acetaminophen     Tablet .. 650 milliGRAM(s) Oral every 6 hours PRN  escitalopram 10 milliGRAM(s) Oral daily  gabapentin Solution 100 milliGRAM(s) Oral two times a day  levETIRAcetam   Injectable 250 milliGRAM(s) IV Push every 12 hours  melatonin 3 milliGRAM(s) Oral at bedtime  memantine 5 milliGRAM(s) Oral two times a day    pantoprazole  Injectable 40 milliGRAM(s) IV Push every 12 hours  sucralfate 1 Gram(s) Oral every 12 hours    insulin glargine Injectable (LANTUS) 14 Unit(s) SubCutaneous at bedtime  insulin lispro (ADMELOG) corrective regimen sliding scale   SubCutaneous at bedtime  insulin lispro (ADMELOG) corrective regimen sliding scale   SubCutaneous three times a day before meals        PAST MEDICAL/SURGICAL HISTORY  PAST MEDICAL & SURGICAL HISTORY:  Hyperlipemia      Hypertension      Coronary Artery Disease      Diabetes Mellitus Type II      Stented Coronary Artery  total 5 stents, last stent 5/2019      Neuropathy      Myocardial infarction      Stroke  mild left facial numbness   no other residuals verbalized      Myoclonic jerking      Stage 3 chronic kidney disease      History of Cataract Extraction      Hx of CABG      H/O coronary angiogram      S/P coronary artery stent placement  1/6/09      S/P placement of cardiac pacemaker          SOCIAL HISTORY: Substance Use (street drugs): ( x ) never used  (  ) other:    FAMILY HISTORY:  No pertinent family history in first degree relatives         PHYSICAL EXAM:  T(C): 36.4 (01-14-24 @ 10:26), Max: 37.1 (01-13-24 @ 20:57)  HR: 80 (01-14-24 @ 10:26) (74 - 83)  BP: 125/75 (01-14-24 @ 10:26) (125/75 - 139/55)  RR: 18 (01-14-24 @ 10:26) (16 - 18)  SpO2: 98% (01-14-24 @ 10:26) (95% - 98%)  Wt(kg): --  I&O's Summary           EYES:   PERRLA   ENMT:   Moist mucous membranes, Good dentition, No lesions  Cardiovascular: Normal S1 S2, No JVD, No murmurs, No edema  Respiratory: b/l rhonchi   Gastrointestinal:  Soft, Non-tender, + BS	  Extremities: no edema                01-13    138  |  99  |  16  ----------------------------<  103<H>  3.5   |  30  |  1.58<H>    Ca    8.4      13 Jan 2024 06:20  Phos  2.6     01-13  Mg     1.60     01-13      proBNP:   Lipid Profile:   HgA1c:   TSH:     Consultant(s) Notes Reviewed:  [x ] YES  [ ] NO    Care Discussed with Consultants/Other Providers [ x] YES  [ ] NO    Imaging Personally Reviewed independently:  [x] YES  [ ] NO    All labs, radiologic studies, vitals, orders and medications list reviewed. Patient is seen and examined at bedside. Case discussed with medical team.

## 2024-01-14 NOTE — PROGRESS NOTE ADULT - SUBJECTIVE AND OBJECTIVE BOX
Date of Service  : 01-14-24     INTERVAL HPI/OVERNIGHT EVENTS: I feel fine.   Vital Signs Last 24 Hrs  T(C): 36.4 (14 Jan 2024 10:26), Max: 37.1 (13 Jan 2024 20:57)  T(F): 97.5 (14 Jan 2024 10:26), Max: 98.7 (13 Jan 2024 20:57)  HR: 80 (14 Jan 2024 10:26) (74 - 83)  BP: 125/75 (14 Jan 2024 10:26) (125/75 - 139/55)  BP(mean): --  RR: 18 (14 Jan 2024 10:26) (16 - 18)  SpO2: 98% (14 Jan 2024 10:26) (95% - 98%)    Parameters below as of 14 Jan 2024 10:49  Patient On (Oxygen Delivery Method): room air      I&O's Summary    MEDICATIONS  (STANDING):  acetylcysteine 20%  Inhalation 4 milliLiter(s) Inhalation every 6 hours  albuterol/ipratropium for Nebulization 3 milliLiter(s) Nebulizer every 12 hours  amLODIPine   Tablet 5 milliGRAM(s) Oral daily  aspirin  chewable 81 milliGRAM(s) Oral daily  benzonatate 100 milliGRAM(s) Oral every 8 hours  escitalopram 10 milliGRAM(s) Oral daily  gabapentin Solution 100 milliGRAM(s) Oral two times a day  guaiFENesin ER 1200 milliGRAM(s) Oral every 12 hours  guaiFENesin Oral Liquid (Sugar-Free) 200 milliGRAM(s) Oral every 6 hours  heparin   Injectable 5000 Unit(s) SubCutaneous every 8 hours  insulin glargine Injectable (LANTUS) 14 Unit(s) SubCutaneous at bedtime  insulin lispro (ADMELOG) corrective regimen sliding scale   SubCutaneous at bedtime  insulin lispro (ADMELOG) corrective regimen sliding scale   SubCutaneous three times a day before meals  levETIRAcetam   Injectable 250 milliGRAM(s) IV Push every 12 hours  melatonin 3 milliGRAM(s) Oral at bedtime  memantine 5 milliGRAM(s) Oral two times a day  metoprolol tartrate 25 milliGRAM(s) Oral two times a day  mometasone 110 MICROgram(s) Inhaler 2 Puff(s) Inhalation every 12 hours  pantoprazole  Injectable 40 milliGRAM(s) IV Push every 12 hours  ranolazine 500 milliGRAM(s) Oral two times a day  sucralfate 1 Gram(s) Oral every 12 hours  ticagrelor 60 milliGRAM(s) Oral every 12 hours    MEDICATIONS  (PRN):  acetaminophen     Tablet .. 650 milliGRAM(s) Oral every 6 hours PRN Temp greater or equal to 38C (100.4F), Mild Pain (1 - 3), Moderate Pain (4 - 6)  albuterol/ipratropium for Nebulization 3 milliLiter(s) Nebulizer every 6 hours PRN Shortness of Breath and/or Wheezing  guaiFENesin Oral Liquid (Sugar-Free) 100 milliGRAM(s) Oral every 6 hours PRN Cough    LABS:    01-13    138  |  99  |  16  ----------------------------<  103<H>  3.5   |  30  |  1.58<H>    Ca    8.4      13 Jan 2024 06:20  Phos  2.6     01-13  Mg     1.60     01-13        Urinalysis Basic - ( 13 Jan 2024 06:20 )    Color: x / Appearance: x / SG: x / pH: x  Gluc: 103 mg/dL / Ketone: x  / Bili: x / Urobili: x   Blood: x / Protein: x / Nitrite: x   Leuk Esterase: x / RBC: x / WBC x   Sq Epi: x / Non Sq Epi: x / Bacteria: x      CAPILLARY BLOOD GLUCOSE      POCT Blood Glucose.: 152 mg/dL (14 Jan 2024 12:46)  POCT Blood Glucose.: 142 mg/dL (14 Jan 2024 09:12)  POCT Blood Glucose.: 184 mg/dL (13 Jan 2024 21:18)  POCT Blood Glucose.: 161 mg/dL (13 Jan 2024 17:38)        Urinalysis Basic - ( 13 Jan 2024 06:20 )    Color: x / Appearance: x / SG: x / pH: x  Gluc: 103 mg/dL / Ketone: x  / Bili: x / Urobili: x   Blood: x / Protein: x / Nitrite: x   Leuk Esterase: x / RBC: x / WBC x   Sq Epi: x / Non Sq Epi: x / Bacteria: x          RADIOLOGY & ADDITIONAL TESTS:    Consultant(s) Notes Reviewed:  [x ] YES  [ ] NO    PHYSICAL EXAM:  GENERAL: NAD, well-groomed, well-developed,not in any distress ,  HEAD:  Atraumatic, Normocephalic  NECK: Supple, No JVD, Normal thyroid  NERVOUS SYSTEM:  Alert & Oriented X3, No focal deficit   CHEST/LUNG: Good air entry bilateral with no  rales, rhonchi, wheezing, or rubs  HEART: Regular rate and rhythm; No murmurs, rubs, or gallops  ABDOMEN: Soft, Nontender, Nondistended; Bowel sounds present  EXTREMITIES:  Less  edema    Care Discussed with Consultants/Other Providers [ x] YES  [ ] NO

## 2024-01-14 NOTE — PROGRESS NOTE ADULT - ASSESSMENT
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 CTA demonstrating mesenteric fat stranding associated with ascending/transverse colon. S/p SMA angiography with stent placement with diagnostic laparoscopy 12/24, small bowel and visible colon viable, some inflammation of omentum in RUQ. Course c/b GI bleed, s/p scope on 1/2 with GI with clips placed. Patient transferred overnight from SICU to the floor in clinically stable condition.       Problem/Recommendation - 1:  ·  Problem: Type 2 diabetes mellitus with hyperglycemia.   ·  Recommendation: sugars better now.   Continue Lantus and SSI.     Problem/Recommendation - 2:  ·  Problem: Acute renal failure superimposed on chronic kidney disease.   ·  Recommendation: Creatinine stable.      Problem/Recommendation - 3:  ·  Problem: CAD (coronary artery disease).   ·  Recommendation: S/P CABG and Stents. On DAPT and BB.  cardiology help appreciated.     Problem/Recommendation - 4:  ·  Problem: Essential hypertension.   ·  Recommendation: BP meds with hold parameters.     Problem/Recommendation - 5:  ·  Problem: GI bleed.   ·  Recommendation: On PPI.  S/P EGD.   Impression:          - NG tube removed before endoscopy                       - LA Grade B esophagitis.                       - Bleeding Dieulafoy's lesion of the posterior wall of                        the gastric body. This is likely the source of                        clinically significant bleeding. Hemostasis achieved                        using 2 MR conditional hemoclips.                       - Bleeding edematous mucosa and nonbleeding clean based                        gastric ulcers in the posterior wall of the gastric                        body. These are likely due to NG tube irritation.                        Hemostasis achieved using 1 MR conditional hemoclip.                       - Non-bleeding small clean based duodenal ulcer                       - No specimens collected.    < end of copied text >.     Problem/Recommendation - 6:  ·  Problem: Myoclonic jerking.   ·  Recommendation: On keppra.     Problem/Recommendation - 7:  ·  Problem: Dysphagia.   ·  Recommendation:  S/P  cineesophagogram r.  Puree diet     Problem/Recommendation - 8:  ·  Problem: Abdominal distension.   ·  Recommendation: S/P  SMA angiography with stent placement with diagnostic laparoscopy 12/24,  Vascular following.     Problem/Recommendation - 9:  ·  Problem: Anemia due to acute blood loss.   ·  Recommendation: S/P PRBC.   HH stable.       Problem/Recommendation - 10:  ·  Problem: Toxic metabolic encephalopathy.   ·  Recommendation: Mental status waxes and wanes .     Problem/Recommendation - 11:  ·  Problem: 2019 novel coronavirus disease (COVID-19).   ·  Recommendation: S/P Renmdisvir.     Problem/Recommendation - 12:  ·  Problem: Leg pain  with edema    ·  Recommendation:  Diuretics,  Doppler negative for  DVT.  On Gabapentin     Problem/Recommendation - 13:  ·  Problem: Persistent Cough.   ·  Recommendation: Improving.   Cough suppressants .  Pulmonary helping       D/W patients  grand son in detail . D/W ACP .  Dispo : DC planning to [facility] [place].                  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 CTA demonstrating mesenteric fat stranding associated with ascending/transverse colon. S/p SMA angiography with stent placement with diagnostic laparoscopy 12/24, small bowel and visible colon viable, some inflammation of omentum in RUQ. Course c/b GI bleed, s/p scope on 1/2 with GI with clips placed. Patient transferred overnight from SICU to the floor in clinically stable condition.       Problem/Recommendation - 1:  ·  Problem: Type 2 diabetes mellitus with hyperglycemia.   ·  Recommendation: sugars better now.   Continue Lantus and SSI.     Problem/Recommendation - 2:  ·  Problem: Acute renal failure superimposed on chronic kidney disease.   ·  Recommendation: Creatinine stable.      Problem/Recommendation - 3:  ·  Problem: CAD (coronary artery disease).   ·  Recommendation: S/P CABG and Stents. On DAPT and BB.  cardiology help appreciated.     Problem/Recommendation - 4:  ·  Problem: Essential hypertension.   ·  Recommendation: BP meds with hold parameters.     Problem/Recommendation - 5:  ·  Problem: GI bleed.   ·  Recommendation: On PPI.  S/P EGD.   Impression:          - NG tube removed before endoscopy                       - LA Grade B esophagitis.                       - Bleeding Dieulafoy's lesion of the posterior wall of                        the gastric body. This is likely the source of                        clinically significant bleeding. Hemostasis achieved                        using 2 MR conditional hemoclips.                       - Bleeding edematous mucosa and nonbleeding clean based                        gastric ulcers in the posterior wall of the gastric                        body. These are likely due to NG tube irritation.                        Hemostasis achieved using 1 MR conditional hemoclip.                       - Non-bleeding small clean based duodenal ulcer                       - No specimens collected.    < end of copied text >.     Problem/Recommendation - 6:  ·  Problem: Myoclonic jerking.   ·  Recommendation: On keppra.     Problem/Recommendation - 7:  ·  Problem: Dysphagia.   ·  Recommendation:  S/P  cineesophagogram r.  Puree diet     Problem/Recommendation - 8:  ·  Problem: Abdominal distension.   ·  Recommendation: S/P  SMA angiography with stent placement with diagnostic laparoscopy 12/24,  Vascular following.     Problem/Recommendation - 9:  ·  Problem: Anemia due to acute blood loss.   ·  Recommendation: S/P PRBC.   HH stable.       Problem/Recommendation - 10:  ·  Problem: Toxic metabolic encephalopathy.   ·  Recommendation: Mental status waxes and wanes .     Problem/Recommendation - 11:  ·  Problem: 2019 novel coronavirus disease (COVID-19).   ·  Recommendation: S/P Renmdisvir.     Problem/Recommendation - 12:  ·  Problem: Leg pain  with edema    ·  Recommendation:  Diuretics,  Doppler negative for  DVT.  On Gabapentin     Problem/Recommendation - 13:  ·  Problem: Persistent Cough.   ·  Recommendation: Improving.   Cough suppressants .  Pulmonary helping       D/W patients  grand son in detail . D/W ACP .  Dispo : DC planning to [facility].

## 2024-01-15 LAB
GLUCOSE BLDC GLUCOMTR-MCNC: 157 MG/DL — HIGH (ref 70–99)
GLUCOSE BLDC GLUCOMTR-MCNC: 157 MG/DL — HIGH (ref 70–99)
GLUCOSE BLDC GLUCOMTR-MCNC: 184 MG/DL — HIGH (ref 70–99)
GLUCOSE BLDC GLUCOMTR-MCNC: 188 MG/DL — HIGH (ref 70–99)
GLUCOSE BLDC GLUCOMTR-MCNC: 188 MG/DL — HIGH (ref 70–99)
GLUCOSE BLDC GLUCOMTR-MCNC: 218 MG/DL — HIGH (ref 70–99)

## 2024-01-15 RX ORDER — FUROSEMIDE 40 MG
40 TABLET ORAL ONCE
Refills: 0 | Status: COMPLETED | OUTPATIENT
Start: 2024-01-15 | End: 2024-01-15

## 2024-01-15 RX ADMIN — Medication 3 MILLILITER(S): at 19:33

## 2024-01-15 RX ADMIN — Medication 3 MILLIGRAM(S): at 21:42

## 2024-01-15 RX ADMIN — HEPARIN SODIUM 5000 UNIT(S): 5000 INJECTION INTRAVENOUS; SUBCUTANEOUS at 21:42

## 2024-01-15 RX ADMIN — HEPARIN SODIUM 5000 UNIT(S): 5000 INJECTION INTRAVENOUS; SUBCUTANEOUS at 05:31

## 2024-01-15 RX ADMIN — PANTOPRAZOLE SODIUM 40 MILLIGRAM(S): 20 TABLET, DELAYED RELEASE ORAL at 18:32

## 2024-01-15 RX ADMIN — LEVETIRACETAM 250 MILLIGRAM(S): 250 TABLET, FILM COATED ORAL at 05:30

## 2024-01-15 RX ADMIN — INSULIN GLARGINE 14 UNIT(S): 100 INJECTION, SOLUTION SUBCUTANEOUS at 21:42

## 2024-01-15 RX ADMIN — Medication 81 MILLIGRAM(S): at 18:30

## 2024-01-15 RX ADMIN — Medication 1 GRAM(S): at 18:30

## 2024-01-15 RX ADMIN — PANTOPRAZOLE SODIUM 40 MILLIGRAM(S): 20 TABLET, DELAYED RELEASE ORAL at 05:32

## 2024-01-15 RX ADMIN — Medication 4: at 18:23

## 2024-01-15 RX ADMIN — AMLODIPINE BESYLATE 5 MILLIGRAM(S): 2.5 TABLET ORAL at 05:35

## 2024-01-15 RX ADMIN — Medication 100 MILLIGRAM(S): at 05:33

## 2024-01-15 RX ADMIN — GABAPENTIN 100 MILLIGRAM(S): 400 CAPSULE ORAL at 18:26

## 2024-01-15 RX ADMIN — Medication 25 MILLIGRAM(S): at 18:27

## 2024-01-15 RX ADMIN — RANOLAZINE 500 MILLIGRAM(S): 500 TABLET, FILM COATED, EXTENDED RELEASE ORAL at 18:27

## 2024-01-15 RX ADMIN — GABAPENTIN 100 MILLIGRAM(S): 400 CAPSULE ORAL at 05:34

## 2024-01-15 RX ADMIN — TICAGRELOR 60 MILLIGRAM(S): 90 TABLET ORAL at 18:27

## 2024-01-15 RX ADMIN — Medication 40 MILLIGRAM(S): at 18:25

## 2024-01-15 RX ADMIN — LEVETIRACETAM 250 MILLIGRAM(S): 250 TABLET, FILM COATED ORAL at 18:24

## 2024-01-15 RX ADMIN — Medication 25 MILLIGRAM(S): at 05:36

## 2024-01-15 RX ADMIN — Medication 2: at 13:23

## 2024-01-15 RX ADMIN — RANOLAZINE 500 MILLIGRAM(S): 500 TABLET, FILM COATED, EXTENDED RELEASE ORAL at 05:33

## 2024-01-15 RX ADMIN — TICAGRELOR 60 MILLIGRAM(S): 90 TABLET ORAL at 05:32

## 2024-01-15 RX ADMIN — ESCITALOPRAM OXALATE 10 MILLIGRAM(S): 10 TABLET, FILM COATED ORAL at 18:30

## 2024-01-15 RX ADMIN — MOMETASONE FUROATE 2 PUFF(S): 220 INHALANT RESPIRATORY (INHALATION) at 21:40

## 2024-01-15 RX ADMIN — HEPARIN SODIUM 5000 UNIT(S): 5000 INJECTION INTRAVENOUS; SUBCUTANEOUS at 13:25

## 2024-01-15 RX ADMIN — Medication 2: at 09:40

## 2024-01-15 RX ADMIN — MEMANTINE HYDROCHLORIDE 5 MILLIGRAM(S): 10 TABLET ORAL at 05:34

## 2024-01-15 RX ADMIN — MEMANTINE HYDROCHLORIDE 5 MILLIGRAM(S): 10 TABLET ORAL at 18:31

## 2024-01-15 RX ADMIN — MOMETASONE FUROATE 2 PUFF(S): 220 INHALANT RESPIRATORY (INHALATION) at 13:25

## 2024-01-15 RX ADMIN — Medication 1 GRAM(S): at 05:32

## 2024-01-15 NOTE — PROGRESS NOTE ADULT - ASSESSMENT
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 CTA demonstrating mesenteric fat stranding associated with ascending/transverse colon. S/p SMA angiography with stent placement with diagnostic laparoscopy 12/24, small bowel and visible colon viable, some inflammation of omentum in RUQ. Course c/b GI bleed, s/p scope on 1/2 with GI with clips placed. Patient transferred overnight from SICU to the floor in clinically stable condition.       Problem/Recommendation - 1:  ·  Problem: Type 2 diabetes mellitus with hyperglycemia.   ·  Recommendation: sugars better now.   Continue Lantus and SSI.     Problem/Recommendation - 2:  ·  Problem: Acute renal failure superimposed on chronic kidney disease.   ·  Recommendation: Creatinine stable.      Problem/Recommendation - 3:  ·  Problem: CAD (coronary artery disease).   ·  Recommendation: S/P CABG and Stents. On DAPT and BB.  cardiology help appreciated.     Problem/Recommendation - 4:  ·  Problem: Essential hypertension.   ·  Recommendation: BP meds with hold parameters.     Problem/Recommendation - 5:  ·  Problem: GI bleed.   ·  Recommendation: On PPI.  S/P EGD.   Impression:          - NG tube removed before endoscopy                       - LA Grade B esophagitis.                       - Bleeding Dieulafoy's lesion of the posterior wall of                        the gastric body. This is likely the source of                        clinically significant bleeding. Hemostasis achieved                        using 2 MR conditional hemoclips.                       - Bleeding edematous mucosa and nonbleeding clean based                        gastric ulcers in the posterior wall of the gastric                        body. These are likely due to NG tube irritation.                        Hemostasis achieved using 1 MR conditional hemoclip.                       - Non-bleeding small clean based duodenal ulcer                       - No specimens collected.    < end of copied text >.     Problem/Recommendation - 6:  ·  Problem: Myoclonic jerking.   ·  Recommendation: On keppra.     Problem/Recommendation - 7:  ·  Problem: Dysphagia.   ·  Recommendation:  S/P  cineesophagogram .  Puree diet     Problem/Recommendation - 8:  ·  Problem: Abdominal distension.   ·  Recommendation: S/P  SMA angiography with stent placement with diagnostic laparoscopy 12/24,  Vascular following.     Problem/Recommendation - 9:  ·  Problem: Anemia due to acute blood loss.   ·  Recommendation: S/P PRBC.   HH stable.       Problem/Recommendation - 10:  ·  Problem: Toxic metabolic encephalopathy.   ·  Recommendation: Mental status waxes and wanes .  Now Improved.      Problem/Recommendation - 11:  ·  Problem: 2019 novel coronavirus disease (COVID-19).   ·  Recommendation: S/P Renmdisvir.     Problem/Recommendation - 12:  ·  Problem: Leg pain  with edema    ·  Recommendation:  Diuretics PRN . ,  Doppler negative for  DVT.  On Gabapentin  < from: TTE W or WO Ultrasound Enhancing Agent (12.25.23 @ 11:09) >  CONCLUSIONS:      1. Technically difficult image quality.   2. Left ventricular cavity is normal. Left ventricular wallthickness is normal. Left ventricular systolic function is normal with an ejection fraction of 62 % by Florence's method of disks. There are no regional wall motion abnormalities seen.   3. Normal right ventricular cavity size and probably normal systolic function.   4. Structurally normal mitral valve with normal leaflet excursion. There is calcification of the mitral valve annulus. There is mild mitral regurgitation.   5. Aortic valve leaflet morphology not well visualized. with reduced systolicexcursion. There is calcification of the aortic valve leaflets. The peak transaortic velocity is 2.47 m/s, peak transaortic gradient is 24.4 mmHg and mean transaortic gradient is 14.0 mmHg with an LVOT/aortic valve VTI ratio of 0.20. Probable mild tomoderate aortic stenosis. There is mild aortic regurgitation.   6. No prior echocardiogram is available for comparison.    < end of copied text >     Problem/Recommendation - 13:  ·  Problem: Persistent Cough.   ·  Recommendation: Improving.   Cough suppressants .  Pulmonary helping       D/W patients son in room . D/W ACP .  Dispo : DC planning to [place] pending  placement .

## 2024-01-15 NOTE — PROGRESS NOTE ADULT - ASSESSMENT
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     EKG SR RBBB (old per family     1) CAD s/p CABG  -p/w chest pain. EKG non ischemic , trop -sera   - echo with normal lv and moderate AS   - c/w metoprolol   - chest pains  Trop mildly elevated and trending down likley sec to kidney disease,    -1/6 c/o of SOB  ?aspiration NGT in place. CXR Moderate bilateral pleural effusions f/u pulm recs also with anasarca restart IV lasix 40 daily once we get labs     2) Covid +  - s/p remdesivir   - c/w supportive management   - t/t per primary team     3) Abd distension   -CTA AP obtained which showed  partially occlusive calcified and non-calcified plaque just distal to take-off of the SMA, with estimated at least 75% luminal narrowing. Limited evaluation of its distal branches. Patient taken emergently to OR on 12/24 s/p diagnostic laparoscopy and SMA stent placement with brachial cutdown  -Surgery/vascular on board , f/u recs  - HIDA scan + for acute asa, medical management as poor surgical candidate cont zosyn  - asa and Brilliant restarted      4) UGIB  -GI on board s/p  EGD 1/2/24 which revealed bleeding vessel (likely Dieulafoy) s/p clipping and NGT trauma s/p clipping, fundus not fully visualized 2/2 clot, minute Tushar III lesion in duodenum.   -on Protonix IV q 12

## 2024-01-15 NOTE — PROGRESS NOTE ADULT - SUBJECTIVE AND OBJECTIVE BOX
Aashish Boyer MD  Interventional Cardiology / Endovascular Specialist  [place] Office : [address] [place] [address] [place] [address]  Tel:   [place] Office : [address] [facility] [place] [address]  Tel: [phone]    Pt is lying in bed comfortable not in distress   	  MEDICATIONS:  amLODIPine   Tablet 5 milliGRAM(s) Oral daily  aspirin  chewable 81 milliGRAM(s) Oral daily  heparin   Injectable 5000 Unit(s) SubCutaneous every 8 hours  metoprolol tartrate 25 milliGRAM(s) Oral two times a day  ranolazine 500 milliGRAM(s) Oral two times a day  ticagrelor 60 milliGRAM(s) Oral every 12 hours      albuterol/ipratropium for Nebulization 3 milliLiter(s) Nebulizer every 12 hours  albuterol/ipratropium for Nebulization 3 milliLiter(s) Nebulizer every 6 hours PRN  guaiFENesin Oral Liquid (Sugar-Free) 100 milliGRAM(s) Oral every 6 hours PRN  mometasone 110 MICROgram(s) Inhaler 2 Puff(s) Inhalation every 12 hours    acetaminophen     Tablet .. 650 milliGRAM(s) Oral every 6 hours PRN  escitalopram 10 milliGRAM(s) Oral daily  gabapentin Solution 100 milliGRAM(s) Oral two times a day  levETIRAcetam   Injectable 250 milliGRAM(s) IV Push every 12 hours  melatonin 3 milliGRAM(s) Oral at bedtime  memantine 5 milliGRAM(s) Oral two times a day    pantoprazole  Injectable 40 milliGRAM(s) IV Push every 12 hours  sucralfate 1 Gram(s) Oral every 12 hours    insulin glargine Injectable (LANTUS) 14 Unit(s) SubCutaneous at bedtime  insulin lispro (ADMELOG) corrective regimen sliding scale   SubCutaneous at bedtime  insulin lispro (ADMELOG) corrective regimen sliding scale   SubCutaneous three times a day before meals        PAST MEDICAL/SURGICAL HISTORY  PAST MEDICAL & SURGICAL HISTORY:  Hyperlipemia      Hypertension      Coronary Artery Disease      Diabetes Mellitus Type II      Stented Coronary Artery  total 5 stents, last stent 5/2019      Neuropathy      Myocardial infarction      Stroke  mild left facial numbness   no other residuals verbalized      Myoclonic jerking      Stage 3 chronic kidney disease      History of Cataract Extraction      Hx of CABG      H/O coronary angiogram      S/P coronary artery stent placement  1/6/09      S/P placement of cardiac pacemaker          SOCIAL HISTORY: Substance Use (street drugs): ( x ) never used  (  ) other:    FAMILY HISTORY:  No pertinent family history in first degree relatives        REVIEW OF SYSTEMS:  unable to accurately assess     PHYSICAL EXAM:  T(C): 37.3 (01-15-24 @ 21:27), Max: 37.3 (01-15-24 @ 21:27)  HR: 81 (01-15-24 @ 21:27) (81 - 91)  BP: 129/59 (01-15-24 @ 21:27) (111/63 - 147/54)  RR: 18 (01-15-24 @ 21:27) (18 - 18)  SpO2: 97% (01-15-24 @ 21:27) (94% - 98%)  Wt(kg): --  I&O's Summary    14 Jan 2024 07:01  -  15 Dmitri 2024 07:00  --------------------------------------------------------  IN: 120 mL / OUT: 0 mL / NET: 120 mL      EYES:   PERRLA   ENMT:   Moist mucous membranes, Good dentition, No lesions  Cardiovascular: Normal S1 S2, No JVD, No murmurs, No edema  Respiratory: b/l rhonchi   Gastrointestinal:  Soft, Non-tender, + BS	  Extremities: no edema              proBNP:   Lipid Profile:   HgA1c:   TSH:     Consultant(s) Notes Reviewed:  [x ] YES  [ ] NO    Care Discussed with Consultants/Other Providers [ x] YES  [ ] NO    Imaging Personally Reviewed independently:  [x] YES  [ ] NO    All labs, radiologic studies, vitals, orders and medications list reviewed. Patient is seen and examined at bedside. Case discussed with medical team.

## 2024-01-15 NOTE — PROGRESS NOTE ADULT - ASSESSMENT
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 Patient states that he has been having a dry cough for the past week, worsened over the past few days. Denies SOB with the cough, denies hemoptysis. Reports fevers at home to 101.5 with associated diaphoresis. Said that he has significant pinprick like b/l chest pain with his cough but denies any chest pain when not coughing or with ambulation. Also reports rhinorrhea and sore throat. Reports generalized weakness and poor appetite. States his daughter was visiting him over the week end last week and she was positive for COVID19. Patient tested positive for COVID19 2 days prior and was started on paxlovid as outpatient. Of note, family states that the patient has had worsening confusion at home over the past 6 months (becoming disoriented to time) but recently has been more confused, talking about seeing people that are not present.   On arrival to the ED his vitals were T 98 -> 99.2, P 80, /72, RR 18, ) sat 100% RA. His lab work showed leukocytosis with neutrophilia and bandemia, MABLE, mild hyponatremia, indeterminant but stable troponins, and elevated lactate to 2.3. His COVID19 swab was positive. His CXR showed bibasilar crackles.  (23 Dec 2023 22:51):  pt has been here for past two weeks and now pulm called fro excessive secretions   he is able to answer simple questions:  grand sons at bedside:     Covid / Resp failure/on 2 L of oxygen  :  CADS/P CABG  DM  CKD  HTN  CVA X 3    Covid / Resp failure/on 2 L of oxygen:  -he was treated for covid before:   -he has excessive secretions  -keep o2 sao2 above 90%  -add BD and mucomyst: ;  -add mucinex:   1/10: he seems to be doing  ok: cont mucomyst and bd   1/11 ?: add benzonatate and Robitussin prm : dc dornase  1/12: seems OK: no sob:  no cough : no phlegm : has gurgling sound in throat:  looks comfortable  1/14: he is on room air:  with good sao2:  finished covid rx:  cont current rx:  high risk for aspiration as he has gurgling secretions in throat   1/15: would do ct chest he seems to have increasing effusions:  his ct scan from last week of december:  has not much of effusions:   CADS/P CABG  -cont current medications   DM  monitor and control   CKD  -cont current meds   HTN   -controlled  CVA X 3  -doing ok :     dw family  and team  GI Bleed:   -secondary to Dieulafoy's lesion:  defer to primary team :    dvt prophylaxis    dwacp             This is a 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 Patient states that he has been having a dry cough for the past week, worsened over the past few days. Denies SOB with the cough, denies hemoptysis. Reports fevers at home to 101.5 with associated diaphoresis. Said that he has significant pinprick like b/l chest pain with his cough but denies any chest pain when not coughing or with ambulation. Also reports rhinorrhea and sore throat. Reports generalized weakness and poor appetite. States his daughter was visiting him over the week end last week and she was positive for COVID19. Patient tested positive for COVID19 2 days prior and was started on paxlovid as outpatient. Of note, family states that the patient has had worsening confusion at home over the past 6 months (becoming disoriented to time) but recently has been more confused, talking about seeing people that are not present.   On arrival to the ED his vitals were T 98 -> 99.2, P 80, /72, RR 18, ) sat 100% RA. His lab work showed leukocytosis with neutrophilia and bandemia, MABLE, mild hyponatremia, indeterminant but stable troponins, and elevated lactate to 2.3. His COVID19 swab was positive. His CXR showed bibasilar crackles.  (23 Dec 2023 22:51):  pt has been here for past two weeks and now pulm called fro excessive secretions   he is able to answer simple questions:  grand sons at bedside:     Covid / Resp failure/on 2 L of oxygen  :  CADS/P CABG  DM  CKD  HTN  CVA X 3    Covid / Resp failure/on 2 L of oxygen:  -he was treated for covid before:   -he has excessive secretions  -keep o2 sao2 above 90%  -add BD and mucomyst: ;  -add mucinex:   1/10: he seems to be doing  ok: cont mucomyst and bd   1/11 ?: add benzonatate and Robitussin prm : dc dornase  1/12: seems OK: no sob:  no cough : no phlegm : has gurgling sound in throat:  looks comfortable  1/14: he is on room air:  with good sao2:  finished covid rx:  cont current rx:  high risk for aspiration as he has gurgling secretions in throat   1/15: would do ct chest he seems to have increasing effusions:  his ct scan from last week of december:  has not much of effusions: however will try lasix : [facility]    CADS/P CABG  -cont current medications   DM  monitor and control   CKD  -cont current meds   HTN   -controlled  CVA X 3  -doing ok :     dw family  and team  GI Bleed:   -secondary to Dieulafoy's lesion:  defer to primary team :    dvt prophylaxis    [facility]

## 2024-01-15 NOTE — PROGRESS NOTE ADULT - SUBJECTIVE AND OBJECTIVE BOX
Date of Service: 01-15-24 @ 11:31    Patient is a 89y old  Male who presents with a chief complaint of COVID19, Chest pain (14 Jan 2024 15:56)      Any change in ROS: alert and awake and responsive:  son is feeding him : no resp distress  on dysphagia diet     MEDICATIONS  (STANDING):  albuterol/ipratropium for Nebulization 3 milliLiter(s) Nebulizer every 12 hours  amLODIPine   Tablet 5 milliGRAM(s) Oral daily  aspirin  chewable 81 milliGRAM(s) Oral daily  escitalopram 10 milliGRAM(s) Oral daily  gabapentin Solution 100 milliGRAM(s) Oral two times a day  heparin   Injectable 5000 Unit(s) SubCutaneous every 8 hours  insulin glargine Injectable (LANTUS) 14 Unit(s) SubCutaneous at bedtime  insulin lispro (ADMELOG) corrective regimen sliding scale   SubCutaneous at bedtime  insulin lispro (ADMELOG) corrective regimen sliding scale   SubCutaneous three times a day before meals  levETIRAcetam   Injectable 250 milliGRAM(s) IV Push every 12 hours  melatonin 3 milliGRAM(s) Oral at bedtime  memantine 5 milliGRAM(s) Oral two times a day  metoprolol tartrate 25 milliGRAM(s) Oral two times a day  mometasone 110 MICROgram(s) Inhaler 2 Puff(s) Inhalation every 12 hours  pantoprazole  Injectable 40 milliGRAM(s) IV Push every 12 hours  ranolazine 500 milliGRAM(s) Oral two times a day  sucralfate 1 Gram(s) Oral every 12 hours  ticagrelor 60 milliGRAM(s) Oral every 12 hours    MEDICATIONS  (PRN):  acetaminophen     Tablet .. 650 milliGRAM(s) Oral every 6 hours PRN Temp greater or equal to 38C (100.4F), Mild Pain (1 - 3), Moderate Pain (4 - 6)  albuterol/ipratropium for Nebulization 3 milliLiter(s) Nebulizer every 6 hours PRN Shortness of Breath and/or Wheezing  guaiFENesin Oral Liquid (Sugar-Free) 100 milliGRAM(s) Oral every 6 hours PRN Cough    Vital Signs Last 24 Hrs  T(C): 36.8 (15 Dmitri 2024 05:25), Max: 37 (15 Dmitri 2024 01:49)  T(F): 98.2 (15 Dmitri 2024 05:25), Max: 98.6 (15 Dmitri 2024 01:49)  HR: 91 (15 Dmitri 2024 05:25) (80 - 91)  BP: 111/63 (15 Dmitri 2024 05:25) (111/63 - 143/58)  BP(mean): --  RR: 18 (15 Dmitri 2024 05:25) (17 - 18)  SpO2: 95% (15 Dmitri 2024 05:25) (94% - 95%)    Parameters below as of 15 Dmitri 2024 05:25  Patient On (Oxygen Delivery Method): nasal cannula  O2 Flow (L/min): 2      I&O's Summary    14 Jan 2024 07:01  -  15 Dmitri 2024 07:00  --------------------------------------------------------  IN: 120 mL / OUT: 0 mL / NET: 120 mL          Physical Exam:   GENERAL: NAD, well-groomed, well-developed  HEENT: JAVAD/   Atraumatic, Normocephalic  ENMT: No tonsillar erythema, exudates, or enlargement; Moist mucous membranes, Good dentition, No lesions  NECK: Supple, No JVD, Normal thyroid  CHEST/LUNG: mild coarse rhonchi +  CVS: Regular rate and rhythm; No murmurs, rubs, or gallops  GI: : Soft, Nontender, Nondistended; Bowel sounds present  NERVOUS SYSTEM:  Alert & awale  EXTREMITIES:  - edema  LYMPH: No lymphadenopathy noted  SKIN: No rashes or lesions  ENDOCRINOLOGY: No Thyromegaly  PSYCH: Appropriate    Labs:  37                            9.0    7.12  )-----------( 312      ( 12 Jan 2024 07:12 )             28.5     01-13    138  |  99  |  16  ----------------------------<  103<H>  3.5   |  30  |  1.58<H>  01-12    141  |  101  |  17  ----------------------------<  104<H>  3.7   |  30  |  1.48<H>        CAPILLARY BLOOD GLUCOSE      POCT Blood Glucose.: 157 mg/dL (15 Dmitri 2024 09:14)  POCT Blood Glucose.: 188 mg/dL (14 Jan 2024 22:12)  POCT Blood Glucose.: 150 mg/dL (14 Jan 2024 21:04)  POCT Blood Glucose.: 228 mg/dL (14 Jan 2024 17:44)  POCT Blood Glucose.: 152 mg/dL (14 Jan 2024 12:46)          rad< from: Xray Chest 1 View- PORTABLE-Urgent (Xray Chest 1 View- PORTABLE-Urgent .) (01.09.24 @ 14:53) >    ACC: [id number] EXAM:  XR CHEST PORTABLE URGENT 1V   ORDERED BY: EDWARD MARINO     PROCEDURE DATE:  01/09/2024          INTERPRETATION:  CLINICAL INDICATION: Abnormal Chest Sounds    TECHNIQUE: Single frontal, portable view of the chest was obtained.    COMPARISON: Chest x-ray 1/6/2024.    FINDINGS:  Left chest wall pacemaker. Cardiac stent. Enteric tube with tip in the   distal stomach/proximal duodenum. Sternotomy wires.  The heart size is normal.  Slight increase in size of moderate bilateral pleural effusions.  No pneumothorax.    IMPRESSION:  Slight increase in size of moderate bilateral pleural effusions.    --- End of Report ---          FABRICE DUMONT MD; Resident Radiologist  This document has been electronically signed.  AMELIA ANGLIN MD; Attending Radiologist  This document has been electronically signed. Jan 9 2024  3:18PM    < end of copied text >          RECENT CULTURES:        RESPIRATORY CULTURES:          Studies  Chest X-RAY  CT SCAN Chest   Venous Dopplers: LE:   CT Abdomen  Others               Date of Service: 01-15-24 @ 11:31    Patient is a 89y old  Male who presents with a chief complaint of COVID19, Chest pain (14 Jan 2024 15:56)      Any change in ROS: alert and awake and responsive:  son is feeding him : no resp distress  on dysphagia diet     MEDICATIONS  (STANDING):  albuterol/ipratropium for Nebulization 3 milliLiter(s) Nebulizer every 12 hours  amLODIPine   Tablet 5 milliGRAM(s) Oral daily  aspirin  chewable 81 milliGRAM(s) Oral daily  escitalopram 10 milliGRAM(s) Oral daily  gabapentin Solution 100 milliGRAM(s) Oral two times a day  heparin   Injectable 5000 Unit(s) SubCutaneous every 8 hours  insulin glargine Injectable (LANTUS) 14 Unit(s) SubCutaneous at bedtime  insulin lispro (ADMELOG) corrective regimen sliding scale   SubCutaneous at bedtime  insulin lispro (ADMELOG) corrective regimen sliding scale   SubCutaneous three times a day before meals  levETIRAcetam   Injectable 250 milliGRAM(s) IV Push every 12 hours  melatonin 3 milliGRAM(s) Oral at bedtime  memantine 5 milliGRAM(s) Oral two times a day  metoprolol tartrate 25 milliGRAM(s) Oral two times a day  mometasone 110 MICROgram(s) Inhaler 2 Puff(s) Inhalation every 12 hours  pantoprazole  Injectable 40 milliGRAM(s) IV Push every 12 hours  ranolazine 500 milliGRAM(s) Oral two times a day  sucralfate 1 Gram(s) Oral every 12 hours  ticagrelor 60 milliGRAM(s) Oral every 12 hours    MEDICATIONS  (PRN):  acetaminophen     Tablet .. 650 milliGRAM(s) Oral every 6 hours PRN Temp greater or equal to 38C (100.4F), Mild Pain (1 - 3), Moderate Pain (4 - 6)  albuterol/ipratropium for Nebulization 3 milliLiter(s) Nebulizer every 6 hours PRN Shortness of Breath and/or Wheezing  guaiFENesin Oral Liquid (Sugar-Free) 100 milliGRAM(s) Oral every 6 hours PRN Cough    Vital Signs Last 24 Hrs  T(C): 36.8 (15 Dmitri 2024 05:25), Max: 37 (15 Dmitri 2024 01:49)  T(F): 98.2 (15 Dmitri 2024 05:25), Max: 98.6 (15 Dmitri 2024 01:49)  HR: 91 (15 Dmitri 2024 05:25) (80 - 91)  BP: 111/63 (15 Dmitri 2024 05:25) (111/63 - 143/58)  BP(mean): --  RR: 18 (15 Dmitri 2024 05:25) (17 - 18)  SpO2: 95% (15 Dmitri 2024 05:25) (94% - 95%)    Parameters below as of 15 Dmitri 2024 05:25  Patient On (Oxygen Delivery Method): nasal cannula  O2 Flow (L/min): 2      I&O's Summary    14 Jan 2024 07:01  -  15 Dmitri 2024 07:00  --------------------------------------------------------  IN: 120 mL / OUT: 0 mL / NET: 120 mL          Physical Exam:   GENERAL: NAD, well-groomed, well-developed  HEENT: JAVAD/   Atraumatic, Normocephalic  ENMT: No tonsillar erythema, exudates, or enlargement; Moist mucous membranes, Good dentition, No lesions  NECK: Supple, No JVD, Normal thyroid  CHEST/LUNG: mild coarse rhonchi +  CVS: Regular rate and rhythm; No murmurs, rubs, or gallops  GI: : Soft, Nontender, Nondistended; Bowel sounds present  NERVOUS SYSTEM:  Alert & awale  EXTREMITIES:  - edema  LYMPH: No lymphadenopathy noted  SKIN: No rashes or lesions  ENDOCRINOLOGY: No Thyromegaly  PSYCH: Appropriate    Labs:  37                            9.0    7.12  )-----------( 312      ( 12 Jan 2024 07:12 )             28.5     01-13    138  |  99  |  16  ----------------------------<  103<H>  3.5   |  30  |  1.58<H>  01-12    141  |  101  |  17  ----------------------------<  104<H>  3.7   |  30  |  1.48<H>        CAPILLARY BLOOD GLUCOSE      POCT Blood Glucose.: 157 mg/dL (15 Dmitri 2024 09:14)  POCT Blood Glucose.: 188 mg/dL (14 Jan 2024 22:12)  POCT Blood Glucose.: 150 mg/dL (14 Jan 2024 21:04)  POCT Blood Glucose.: 228 mg/dL (14 Jan 2024 17:44)  POCT Blood Glucose.: 152 mg/dL (14 Jan 2024 12:46)          rad< from: Xray Chest 1 View- PORTABLE-Urgent (Xray Chest 1 View- PORTABLE-Urgent .) (01.09.24 @ 14:53) >    ACC: [id number] EXAM:  XR CHEST PORTABLE URGENT 1V   ORDERED BY: EDWARD MARINO     PROCEDURE DATE:  01/09/2024          INTERPRETATION:  CLINICAL INDICATION: Abnormal Chest Sounds    TECHNIQUE: Single frontal, portable view of the chest was obtained.    COMPARISON: Chest x-ray 1/6/2024.    FINDINGS:  Left chest wall pacemaker. Cardiac stent. Enteric tube with tip in the   distal stomach/proximal duodenum. Sternotomy wires.  The heart size is normal.  Slight increase in size of moderate bilateral pleural effusions.  No pneumothorax.    IMPRESSION:  Slight increase in size of moderate bilateral pleural effusions.    --- End of Report ---          FABRICE DUMONT MD; Resident Radiologist  This document has been electronically signed.  AMELIA ANGLIN MD; Attending Radiologist  This document has been electronically signed. Jan 9 2024  3:18PM    < end of copied text >          RECENT CULTURES:        RESPIRATORY CULTURES:          Studies  Chest X-RAY  CT SCAN Chest   Venous Dopplers: LE:   CT Abdomen  Others               Date of Service: 01-15-24 @ 11:31    Patient is a 89y old  Male who presents with a chief complaint of COVID19, Chest pain (14 Jan 2024 15:56)      Any change in ROS: alert and awake and responsive:  son is feeding him : no resp distress  on dysphagia diet     MEDICATIONS  (STANDING):  albuterol/ipratropium for Nebulization 3 milliLiter(s) Nebulizer every 12 hours  amLODIPine   Tablet 5 milliGRAM(s) Oral daily  aspirin  chewable 81 milliGRAM(s) Oral daily  escitalopram 10 milliGRAM(s) Oral daily  gabapentin Solution 100 milliGRAM(s) Oral two times a day  heparin   Injectable 5000 Unit(s) SubCutaneous every 8 hours  insulin glargine Injectable (LANTUS) 14 Unit(s) SubCutaneous at bedtime  insulin lispro (ADMELOG) corrective regimen sliding scale   SubCutaneous at bedtime  insulin lispro (ADMELOG) corrective regimen sliding scale   SubCutaneous three times a day before meals  levETIRAcetam   Injectable 250 milliGRAM(s) IV Push every 12 hours  melatonin 3 milliGRAM(s) Oral at bedtime  memantine 5 milliGRAM(s) Oral two times a day  metoprolol tartrate 25 milliGRAM(s) Oral two times a day  mometasone 110 MICROgram(s) Inhaler 2 Puff(s) Inhalation every 12 hours  pantoprazole  Injectable 40 milliGRAM(s) IV Push every 12 hours  ranolazine 500 milliGRAM(s) Oral two times a day  sucralfate 1 Gram(s) Oral every 12 hours  ticagrelor 60 milliGRAM(s) Oral every 12 hours    MEDICATIONS  (PRN):  acetaminophen     Tablet .. 650 milliGRAM(s) Oral every 6 hours PRN Temp greater or equal to 38C (100.4F), Mild Pain (1 - 3), Moderate Pain (4 - 6)  albuterol/ipratropium for Nebulization 3 milliLiter(s) Nebulizer every 6 hours PRN Shortness of Breath and/or Wheezing  guaiFENesin Oral Liquid (Sugar-Free) 100 milliGRAM(s) Oral every 6 hours PRN Cough    Vital Signs Last 24 Hrs  T(C): 36.8 (15 Dmitri 2024 05:25), Max: 37 (15 Dmitri 2024 01:49)  T(F): 98.2 (15 Dmitri 2024 05:25), Max: 98.6 (15 Dmitri 2024 01:49)  HR: 91 (15 Dmitri 2024 05:25) (80 - 91)  BP: 111/63 (15 Dmitri 2024 05:25) (111/63 - 143/58)  BP(mean): --  RR: 18 (15 Dmitri 2024 05:25) (17 - 18)  SpO2: 95% (15 Dmitri 2024 05:25) (94% - 95%)    Parameters below as of 15 Dmitri 2024 05:25  Patient On (Oxygen Delivery Method): nasal cannula  O2 Flow (L/min): 2      I&O's Summary    14 Jan 2024 07:01  -  15 Dmitri 2024 07:00  --------------------------------------------------------  IN: 120 mL / OUT: 0 mL / NET: 120 mL          Physical Exam:   GENERAL: NAD, well-groomed, well-developed  HEENT: JAVAD/   Atraumatic, Normocephalic  ENMT: No tonsillar erythema, exudates, or enlargement; Moist mucous membranes, Good dentition, No lesions  NECK: Supple, No JVD, Normal thyroid  CHEST/LUNG: mild coarse rhonchi +  CVS: Regular rate and rhythm; No murmurs, rubs, or gallops  GI: : Soft, Nontender, Nondistended; Bowel sounds present  NERVOUS SYSTEM:  Alert & awale  EXTREMITIES:  - edema  LYMPH: No lymphadenopathy noted  SKIN: No rashes or lesions  ENDOCRINOLOGY: No Thyromegaly  PSYCH: Appropriate    Labs:  37                            9.0    7.12  )-----------( 312      ( 12 Jan 2024 07:12 )             28.5     01-13    138  |  99  |  16  ----------------------------<  103<H>  3.5   |  30  |  1.58<H>  01-12    141  |  101  |  17  ----------------------------<  104<H>  3.7   |  30  |  1.48<H>        CAPILLARY BLOOD GLUCOSE      POCT Blood Glucose.: 157 mg/dL (15 Dmitri 2024 09:14)  POCT Blood Glucose.: 188 mg/dL (14 Jan 2024 22:12)  POCT Blood Glucose.: 150 mg/dL (14 Jan 2024 21:04)  POCT Blood Glucose.: 228 mg/dL (14 Jan 2024 17:44)  POCT Blood Glucose.: 152 mg/dL (14 Jan 2024 12:46)          rad< from: Xray Chest 1 View- PORTABLE-Urgent (Xray Chest 1 View- PORTABLE-Urgent .) (01.09.24 @ 14:53) >    ACC: [id number] EXAM:  XR CHEST PORTABLE URGENT 1V   ORDERED BY: EDWARD MARINO     PROCEDURE DATE:  01/09/2024          INTERPRETATION:  CLINICAL INDICATION: Abnormal Chest Sounds    TECHNIQUE: Single frontal, portable view of the chest was obtained.    COMPARISON: Chest x-ray 1/6/2024.    FINDINGS:  Left chest wall pacemaker. Cardiac stent. Enteric tube with tip in the   distal stomach/proximal duodenum. Sternotomy wires.  The heart size is normal.  Slight increase in size of moderate bilateral pleural effusions.  No pneumothorax.    IMPRESSION:  Slight increase in size of moderate bilateral pleural effusions.    --- End of Report ---          FABRICE DUMONT MD; Resident Radiologist  This document has been electronically signed.  AMELIA ANGLIN MD; Attending Radiologist  This document has been electronically signed. Jan 9 2024  3:18PM    < end of copied text >          RECENT CULTURES:        RESPIRATORY CULTURES:          Studies  Chest X-RAY  CT SCAN Chest   Venous Dopplers: LE:   CT Abdomen  Others

## 2024-01-15 NOTE — PROGRESS NOTE ADULT - ASSESSMENT
on distress   on  puree  diet   family at bedside  acetaminophen   IVPB .. 1000 milliGRAM(s) IV Intermittent every 6 hours PRN  albuterol/ipratropium for Nebulization 3 milliLiter(s) Nebulizer every 12 hours  amLODIPine   Tablet 5 milliGRAM(s) Oral daily  aspirin  chewable 81 milliGRAM(s) Oral daily  dornase cierra Solution 2.5 milliGRAM(s) Inhalation every 12 hours  escitalopram 10 milliGRAM(s) Oral daily  gabapentin Solution 100 milliGRAM(s) Oral two times a day  heparin   Injectable 5000 Unit(s) SubCutaneous every 8 hours  insulin glargine Injectable (LANTUS) 14 Unit(s) SubCutaneous at bedtime  insulin lispro (ADMELOG) corrective regimen sliding scale   SubCutaneous every 6 hours  levETIRAcetam   Injectable 250 milliGRAM(s) IV Push every 12 hours  melatonin 3 milliGRAM(s) Oral at bedtime  memantine 5 milliGRAM(s) Oral two times a day  metoprolol tartrate Injectable 5 milliGRAM(s) IV Push every 6 hours  mometasone 110 MICROgram(s) Inhaler 2 Puff(s) Inhalation every 12 hours  pantoprazole  Injectable 40 milliGRAM(s) IV Push every 12 hours  ranolazine 500 milliGRAM(s) Oral two times a day  sodium chloride 0.45% with potassium chloride 20 mEq/L 1000 milliLiter(s) IV Continuous <Continuous>  sodium phosphate 30 milliMole(s)/500 mL IVPB 30 milliMole(s) IV Intermittent once  sucralfate 1 Gram(s) Oral every 12 hours  ticagrelor 60 milliGRAM(s) Enteral Tube every 12 hours      VITAL:  T(C): , Max: 37 (01-06-24 @ 21:06)  T(F): , Max: 98.6 (01-06-24 @ 21:06)  HR: 86 (01-07-24 @ 05:45)  BP: 180/82 (01-07-24 @ 05:45)  BP(mean): --  RR: 19 (01-07-24 @ 05:45)  SpO2: 95% (01-07-24 @ 05:45)  Wt(kg): --    01-06-24 @ 07:01  -  01-07-24 @ 07:00  --------------------------------------------------------  IN: 185 mL / OUT: 0 mL / NET: 185 mL        PHYSICAL EXAM:  General: NAD; Alert  HEENT:  NCAT; PERRLA, +NGT  Neck: No JVD; supple  Respiratory: b/l scattered rales   Cardiac: RRR s1s2  Gastrointestinal: BS+, soft, NT/ND  Urologic: No delong  Extremities: No peripheral edema  Access:     LABS:                          9.2    10.00 )-----------( 312      ( 07 Jan 2024 06:00 )             28.6     Na(137)/K(5.2)/Cl(109)/HCO3(19)/BUN(21)/Cr(1.35)Glu(301)/Ca(7.8)/Mg(2.20)/PO4(2.2)    01-07 @ 06:00  Na(143)/K(5.0)/Cl(111)/HCO3(20)/BUN(20)/Cr(1.49)Glu(195)/Ca(8.2)/Mg(1.90)/PO4(2.7)    01-06 @ 07:35  Na(142)/K(5.1)/Cl(111)/HCO3(19)/BUN(22)/Cr(1.45)Glu(219)/Ca(8.0)/Mg(1.90)/PO4(3.2)    01-05 @ 22:19  Na(140)/K(5.0)/Cl(111)/HCO3(16)/BUN(24)/Cr(1.41)Glu(199)/Ca(8.1)/Mg(1.90)/PO4(3.3)    01-05 @ 01:00    Urinalysis Basic - ( 07 Jan 2024 06:00 )    Color: x / Appearance: x / SG: x / pH: x  Gluc: 301 mg/dL / Ketone: x  / Bili: x / Urobili: x   Blood: x / Protein: x / Nitrite: x   Leuk Esterase: x / RBC: x / WBC x   Sq Epi: x / Non Sq Epi: x / Bacteria: x            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) who presents to the hospital for COVID19 infection and chest pain  MABLE ----improving   Hypophosphatemia - resolved   Hypertensive urgency -resolved --- BP meds as ordered     1 Renal - renal fxn slight up trending , urine lytes   2 Lytes- controlled   3 Hypocalcemia -resolved   4 CV - BP soft stable , on Lopressor 25 mg bid , and amlodipine 5 mg daily  5 Vasc - s/p SMA stent placement;   6 Sx - s/p HIDA + for acute asa, medical management, on puree diet , encourage free water in take too (if allowed  with aspiration precautions)   7 GI - s/p EGD clipping of bleeding duodenal ulcers   8 Anemia- hgb stable           [facility]  Office: [phone]     on distress   on  puree  diet   family at bedside  acetaminophen   IVPB .. 1000 milliGRAM(s) IV Intermittent every 6 hours PRN  albuterol/ipratropium for Nebulization 3 milliLiter(s) Nebulizer every 12 hours  amLODIPine   Tablet 5 milliGRAM(s) Oral daily  aspirin  chewable 81 milliGRAM(s) Oral daily  dornase cierra Solution 2.5 milliGRAM(s) Inhalation every 12 hours  escitalopram 10 milliGRAM(s) Oral daily  gabapentin Solution 100 milliGRAM(s) Oral two times a day  heparin   Injectable 5000 Unit(s) SubCutaneous every 8 hours  insulin glargine Injectable (LANTUS) 14 Unit(s) SubCutaneous at bedtime  insulin lispro (ADMELOG) corrective regimen sliding scale   SubCutaneous every 6 hours  levETIRAcetam   Injectable 250 milliGRAM(s) IV Push every 12 hours  melatonin 3 milliGRAM(s) Oral at bedtime  memantine 5 milliGRAM(s) Oral two times a day  metoprolol tartrate Injectable 5 milliGRAM(s) IV Push every 6 hours  mometasone 110 MICROgram(s) Inhaler 2 Puff(s) Inhalation every 12 hours  pantoprazole  Injectable 40 milliGRAM(s) IV Push every 12 hours  ranolazine 500 milliGRAM(s) Oral two times a day  sodium chloride 0.45% with potassium chloride 20 mEq/L 1000 milliLiter(s) IV Continuous <Continuous>  sodium phosphate 30 milliMole(s)/500 mL IVPB 30 milliMole(s) IV Intermittent once  sucralfate 1 Gram(s) Oral every 12 hours  ticagrelor 60 milliGRAM(s) Enteral Tube every 12 hours      VITAL:  T(C): , Max: 37 (01-06-24 @ 21:06)  T(F): , Max: 98.6 (01-06-24 @ 21:06)  HR: 86 (01-07-24 @ 05:45)  BP: 180/82 (01-07-24 @ 05:45)  BP(mean): --  RR: 19 (01-07-24 @ 05:45)  SpO2: 95% (01-07-24 @ 05:45)  Wt(kg): --    01-06-24 @ 07:01  -  01-07-24 @ 07:00  --------------------------------------------------------  IN: 185 mL / OUT: 0 mL / NET: 185 mL        PHYSICAL EXAM:  General: NAD; Alert  HEENT:  NCAT; PERRLA, +NGT  Neck: No JVD; supple  Respiratory: b/l scattered rales   Cardiac: RRR s1s2  Gastrointestinal: BS+, soft, NT/ND  Urologic: No delong  Extremities: No peripheral edema  Access:     LABS:                          9.2    10.00 )-----------( 312      ( 07 Jan 2024 06:00 )             28.6     Na(137)/K(5.2)/Cl(109)/HCO3(19)/BUN(21)/Cr(1.35)Glu(301)/Ca(7.8)/Mg(2.20)/PO4(2.2)    01-07 @ 06:00  Na(143)/K(5.0)/Cl(111)/HCO3(20)/BUN(20)/Cr(1.49)Glu(195)/Ca(8.2)/Mg(1.90)/PO4(2.7)    01-06 @ 07:35  Na(142)/K(5.1)/Cl(111)/HCO3(19)/BUN(22)/Cr(1.45)Glu(219)/Ca(8.0)/Mg(1.90)/PO4(3.2)    01-05 @ 22:19  Na(140)/K(5.0)/Cl(111)/HCO3(16)/BUN(24)/Cr(1.41)Glu(199)/Ca(8.1)/Mg(1.90)/PO4(3.3)    01-05 @ 01:00    Urinalysis Basic - ( 07 Jan 2024 06:00 )    Color: x / Appearance: x / SG: x / pH: x  Gluc: 301 mg/dL / Ketone: x  / Bili: x / Urobili: x   Blood: x / Protein: x / Nitrite: x   Leuk Esterase: x / RBC: x / WBC x   Sq Epi: x / Non Sq Epi: x / Bacteria: x            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) who presents to the hospital for COVID19 infection and chest pain  MABLE ----improving   Hypophosphatemia - resolved   Hypertensive urgency -resolved --- BP meds as ordered     1 Renal - renal fxn slight up trending , urine lytes   2 Lytes- controlled   3 Hypocalcemia -resolved   4 CV - BP soft stable , on Lopressor 25 mg bid , and amlodipine 5 mg daily  5 Vasc - s/p SMA stent placement;   6 Sx - s/p HIDA + for acute asa, medical management, on puree diet , encourage free water in take too (if allowed  with aspiration precautions)   7 GI - s/p EGD clipping of bleeding duodenal ulcers   8 Anemia- hgb stable           [facility]  Office: [phone]     on distress   on  puree  diet   family at bedside  acetaminophen   IVPB .. 1000 milliGRAM(s) IV Intermittent every 6 hours PRN  albuterol/ipratropium for Nebulization 3 milliLiter(s) Nebulizer every 12 hours  amLODIPine   Tablet 5 milliGRAM(s) Oral daily  aspirin  chewable 81 milliGRAM(s) Oral daily  dornase cierra Solution 2.5 milliGRAM(s) Inhalation every 12 hours  escitalopram 10 milliGRAM(s) Oral daily  gabapentin Solution 100 milliGRAM(s) Oral two times a day  heparin   Injectable 5000 Unit(s) SubCutaneous every 8 hours  insulin glargine Injectable (LANTUS) 14 Unit(s) SubCutaneous at bedtime  insulin lispro (ADMELOG) corrective regimen sliding scale   SubCutaneous every 6 hours  levETIRAcetam   Injectable 250 milliGRAM(s) IV Push every 12 hours  melatonin 3 milliGRAM(s) Oral at bedtime  memantine 5 milliGRAM(s) Oral two times a day  metoprolol tartrate Injectable 5 milliGRAM(s) IV Push every 6 hours  mometasone 110 MICROgram(s) Inhaler 2 Puff(s) Inhalation every 12 hours  pantoprazole  Injectable 40 milliGRAM(s) IV Push every 12 hours  ranolazine 500 milliGRAM(s) Oral two times a day  sodium chloride 0.45% with potassium chloride 20 mEq/L 1000 milliLiter(s) IV Continuous <Continuous>  sodium phosphate 30 milliMole(s)/500 mL IVPB 30 milliMole(s) IV Intermittent once  sucralfate 1 Gram(s) Oral every 12 hours  ticagrelor 60 milliGRAM(s) Enteral Tube every 12 hours      VITAL:  T(C): , Max: 37 (01-06-24 @ 21:06)  T(F): , Max: 98.6 (01-06-24 @ 21:06)  HR: 86 (01-07-24 @ 05:45)  BP: 180/82 (01-07-24 @ 05:45)  BP(mean): --  RR: 19 (01-07-24 @ 05:45)  SpO2: 95% (01-07-24 @ 05:45)  Wt(kg): --    01-06-24 @ 07:01  -  01-07-24 @ 07:00  --------------------------------------------------------  IN: 185 mL / OUT: 0 mL / NET: 185 mL        PHYSICAL EXAM:  General: NAD; Alert  HEENT:  NCAT; PERRLA, +NGT  Neck: No JVD; supple  Respiratory: b/l scattered rales   Cardiac: RRR s1s2  Gastrointestinal: BS+, soft, NT/ND  Urologic: No delong  Extremities: No peripheral edema  Access:     LABS:                          9.2    10.00 )-----------( 312      ( 07 Jan 2024 06:00 )             28.6     Na(137)/K(5.2)/Cl(109)/HCO3(19)/BUN(21)/Cr(1.35)Glu(301)/Ca(7.8)/Mg(2.20)/PO4(2.2)    01-07 @ 06:00  Na(143)/K(5.0)/Cl(111)/HCO3(20)/BUN(20)/Cr(1.49)Glu(195)/Ca(8.2)/Mg(1.90)/PO4(2.7)    01-06 @ 07:35  Na(142)/K(5.1)/Cl(111)/HCO3(19)/BUN(22)/Cr(1.45)Glu(219)/Ca(8.0)/Mg(1.90)/PO4(3.2)    01-05 @ 22:19  Na(140)/K(5.0)/Cl(111)/HCO3(16)/BUN(24)/Cr(1.41)Glu(199)/Ca(8.1)/Mg(1.90)/PO4(3.3)    01-05 @ 01:00    Urinalysis Basic - ( 07 Jan 2024 06:00 )    Color: x / Appearance: x / SG: x / pH: x  Gluc: 301 mg/dL / Ketone: x  / Bili: x / Urobili: x   Blood: x / Protein: x / Nitrite: x   Leuk Esterase: x / RBC: x / WBC x   Sq Epi: x / Non Sq Epi: x / Bacteria: x            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) who presents to the hospital for COVID19 infection and chest pain  MABLE ----improving   Hypophosphatemia - resolved   Hypertensive urgency -resolved --- BP meds as ordered     1 Renal - renal fxn slight up trending , urine lytes   2 Lytes- controlled   3 Hypocalcemia -resolved   4 CV - BP soft stable , on Lopressor 25 mg bid , and amlodipine 5 mg daily  5 Vasc - s/p SMA stent placement;   6 Sx - s/p HIDA + for acute asa, medical management, on puree diet , encourage free water in take too (if allowed  with aspiration precautions)   7 GI - s/p EGD clipping of bleeding duodenal ulcers   8 Anemia- hgb stable           [facility]  Office: [phone]     on distress   on  puree  diet   family at bedside  acetaminophen   IVPB .. 1000 milliGRAM(s) IV Intermittent every 6 hours PRN  albuterol/ipratropium for Nebulization 3 milliLiter(s) Nebulizer every 12 hours  amLODIPine   Tablet 5 milliGRAM(s) Oral daily  aspirin  chewable 81 milliGRAM(s) Oral daily  dornase cierra Solution 2.5 milliGRAM(s) Inhalation every 12 hours  escitalopram 10 milliGRAM(s) Oral daily  gabapentin Solution 100 milliGRAM(s) Oral two times a day  heparin   Injectable 5000 Unit(s) SubCutaneous every 8 hours  insulin glargine Injectable (LANTUS) 14 Unit(s) SubCutaneous at bedtime  insulin lispro (ADMELOG) corrective regimen sliding scale   SubCutaneous every 6 hours  levETIRAcetam   Injectable 250 milliGRAM(s) IV Push every 12 hours  melatonin 3 milliGRAM(s) Oral at bedtime  memantine 5 milliGRAM(s) Oral two times a day  metoprolol tartrate Injectable 5 milliGRAM(s) IV Push every 6 hours  mometasone 110 MICROgram(s) Inhaler 2 Puff(s) Inhalation every 12 hours  pantoprazole  Injectable 40 milliGRAM(s) IV Push every 12 hours  ranolazine 500 milliGRAM(s) Oral two times a day  sodium chloride 0.45% with potassium chloride 20 mEq/L 1000 milliLiter(s) IV Continuous <Continuous>  sodium phosphate 30 milliMole(s)/500 mL IVPB 30 milliMole(s) IV Intermittent once  sucralfate 1 Gram(s) Oral every 12 hours  ticagrelor 60 milliGRAM(s) Enteral Tube every 12 hours      VITAL:  T(C): , Max: 37 (01-06-24 @ 21:06)  T(F): , Max: 98.6 (01-06-24 @ 21:06)  HR: 86 (01-07-24 @ 05:45)  BP: 180/82 (01-07-24 @ 05:45)  BP(mean): --  RR: 19 (01-07-24 @ 05:45)  SpO2: 95% (01-07-24 @ 05:45)  Wt(kg): --    01-06-24 @ 07:01  -  01-07-24 @ 07:00  --------------------------------------------------------  IN: 185 mL / OUT: 0 mL / NET: 185 mL        PHYSICAL EXAM:  General: NAD; Alert  HEENT:  NCAT; PERRLA, +NGT  Neck: No JVD; supple  Respiratory: b/l scattered rales   Cardiac: RRR s1s2  Gastrointestinal: BS+, soft, NT/ND  Urologic: No delong  Extremities: No peripheral edema  Access:     LABS:                          9.2    10.00 )-----------( 312      ( 07 Jan 2024 06:00 )             28.6     Na(137)/K(5.2)/Cl(109)/HCO3(19)/BUN(21)/Cr(1.35)Glu(301)/Ca(7.8)/Mg(2.20)/PO4(2.2)    01-07 @ 06:00  Na(143)/K(5.0)/Cl(111)/HCO3(20)/BUN(20)/Cr(1.49)Glu(195)/Ca(8.2)/Mg(1.90)/PO4(2.7)    01-06 @ 07:35  Na(142)/K(5.1)/Cl(111)/HCO3(19)/BUN(22)/Cr(1.45)Glu(219)/Ca(8.0)/Mg(1.90)/PO4(3.2)    01-05 @ 22:19  Na(140)/K(5.0)/Cl(111)/HCO3(16)/BUN(24)/Cr(1.41)Glu(199)/Ca(8.1)/Mg(1.90)/PO4(3.3)    01-05 @ 01:00    Urinalysis Basic - ( 07 Jan 2024 06:00 )    Color: x / Appearance: x / SG: x / pH: x  Gluc: 301 mg/dL / Ketone: x  / Bili: x / Urobili: x   Blood: x / Protein: x / Nitrite: x   Leuk Esterase: x / RBC: x / WBC x   Sq Epi: x / Non Sq Epi: x / Bacteria: x            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) who presents to the hospital for COVID19 infection and chest pain  MABLE ----improving   Hypophosphatemia - resolved   Hypertensive urgency -resolved --- BP meds as ordered     1 Renal - renal fxn slight up trending , urine lytes   2 Lytes- controlled   3 Hypocalcemia -resolved   4 CV - BP soft stable , on Lopressor 25 mg bid , and amlodipine 5 mg daily  5 Vasc - s/p SMA stent placement;   6 Sx - s/p HIDA + for acute asa, medical management, on puree diet , encourage free water in take too (if allowed  with aspiration precautions)   7 GI - s/p EGD clipping of bleeding duodenal ulcers   8 Anemia- hgb stable         d-w son at bedside  [facility]  Office: [phone]     on distress   on  puree  diet   family at bedside  acetaminophen   IVPB .. 1000 milliGRAM(s) IV Intermittent every 6 hours PRN  albuterol/ipratropium for Nebulization 3 milliLiter(s) Nebulizer every 12 hours  amLODIPine   Tablet 5 milliGRAM(s) Oral daily  aspirin  chewable 81 milliGRAM(s) Oral daily  dornase cierra Solution 2.5 milliGRAM(s) Inhalation every 12 hours  escitalopram 10 milliGRAM(s) Oral daily  gabapentin Solution 100 milliGRAM(s) Oral two times a day  heparin   Injectable 5000 Unit(s) SubCutaneous every 8 hours  insulin glargine Injectable (LANTUS) 14 Unit(s) SubCutaneous at bedtime  insulin lispro (ADMELOG) corrective regimen sliding scale   SubCutaneous every 6 hours  levETIRAcetam   Injectable 250 milliGRAM(s) IV Push every 12 hours  melatonin 3 milliGRAM(s) Oral at bedtime  memantine 5 milliGRAM(s) Oral two times a day  metoprolol tartrate Injectable 5 milliGRAM(s) IV Push every 6 hours  mometasone 110 MICROgram(s) Inhaler 2 Puff(s) Inhalation every 12 hours  pantoprazole  Injectable 40 milliGRAM(s) IV Push every 12 hours  ranolazine 500 milliGRAM(s) Oral two times a day  sodium chloride 0.45% with potassium chloride 20 mEq/L 1000 milliLiter(s) IV Continuous <Continuous>  sodium phosphate 30 milliMole(s)/500 mL IVPB 30 milliMole(s) IV Intermittent once  sucralfate 1 Gram(s) Oral every 12 hours  ticagrelor 60 milliGRAM(s) Enteral Tube every 12 hours      VITAL:  T(C): , Max: 37 (01-06-24 @ 21:06)  T(F): , Max: 98.6 (01-06-24 @ 21:06)  HR: 86 (01-07-24 @ 05:45)  BP: 180/82 (01-07-24 @ 05:45)  BP(mean): --  RR: 19 (01-07-24 @ 05:45)  SpO2: 95% (01-07-24 @ 05:45)  Wt(kg): --    01-06-24 @ 07:01  -  01-07-24 @ 07:00  --------------------------------------------------------  IN: 185 mL / OUT: 0 mL / NET: 185 mL        PHYSICAL EXAM:  General: NAD; Alert  HEENT:  NCAT; PERRLA, +NGT  Neck: No JVD; supple  Respiratory: b/l scattered rales   Cardiac: RRR s1s2  Gastrointestinal: BS+, soft, NT/ND  Urologic: No delong  Extremities: No peripheral edema  Access:     LABS:                          9.2    10.00 )-----------( 312      ( 07 Jan 2024 06:00 )             28.6     Na(137)/K(5.2)/Cl(109)/HCO3(19)/BUN(21)/Cr(1.35)Glu(301)/Ca(7.8)/Mg(2.20)/PO4(2.2)    01-07 @ 06:00  Na(143)/K(5.0)/Cl(111)/HCO3(20)/BUN(20)/Cr(1.49)Glu(195)/Ca(8.2)/Mg(1.90)/PO4(2.7)    01-06 @ 07:35  Na(142)/K(5.1)/Cl(111)/HCO3(19)/BUN(22)/Cr(1.45)Glu(219)/Ca(8.0)/Mg(1.90)/PO4(3.2)    01-05 @ 22:19  Na(140)/K(5.0)/Cl(111)/HCO3(16)/BUN(24)/Cr(1.41)Glu(199)/Ca(8.1)/Mg(1.90)/PO4(3.3)    01-05 @ 01:00    Urinalysis Basic - ( 07 Jan 2024 06:00 )    Color: x / Appearance: x / SG: x / pH: x  Gluc: 301 mg/dL / Ketone: x  / Bili: x / Urobili: x   Blood: x / Protein: x / Nitrite: x   Leuk Esterase: x / RBC: x / WBC x   Sq Epi: x / Non Sq Epi: x / Bacteria: x            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) who presents to the hospital for COVID19 infection and chest pain  MABLE ----improving   Hypophosphatemia - resolved   Hypertensive urgency -resolved --- BP meds as ordered     1 Renal - renal fxn slight up trending , urine lytes   2 Lytes- controlled   3 Hypocalcemia -resolved   4 CV - BP soft stable , on Lopressor 25 mg bid , and amlodipine 5 mg daily  5 Vasc - s/p SMA stent placement;   6 Sx - s/p HIDA + for acute asa, medical management, on puree diet , encourage free water in take too (if allowed  with aspiration precautions)   7 GI - s/p EGD clipping of bleeding duodenal ulcers   8 Anemia- hgb stable         d-w son at bedside  [facility]  Office: [phone]     on distress   on  puree  diet   family at bedside  acetaminophen   IVPB .. 1000 milliGRAM(s) IV Intermittent every 6 hours PRN  albuterol/ipratropium for Nebulization 3 milliLiter(s) Nebulizer every 12 hours  amLODIPine   Tablet 5 milliGRAM(s) Oral daily  aspirin  chewable 81 milliGRAM(s) Oral daily  dornase cierra Solution 2.5 milliGRAM(s) Inhalation every 12 hours  escitalopram 10 milliGRAM(s) Oral daily  gabapentin Solution 100 milliGRAM(s) Oral two times a day  heparin   Injectable 5000 Unit(s) SubCutaneous every 8 hours  insulin glargine Injectable (LANTUS) 14 Unit(s) SubCutaneous at bedtime  insulin lispro (ADMELOG) corrective regimen sliding scale   SubCutaneous every 6 hours  levETIRAcetam   Injectable 250 milliGRAM(s) IV Push every 12 hours  melatonin 3 milliGRAM(s) Oral at bedtime  memantine 5 milliGRAM(s) Oral two times a day  metoprolol tartrate Injectable 5 milliGRAM(s) IV Push every 6 hours  mometasone 110 MICROgram(s) Inhaler 2 Puff(s) Inhalation every 12 hours  pantoprazole  Injectable 40 milliGRAM(s) IV Push every 12 hours  ranolazine 500 milliGRAM(s) Oral two times a day  sodium chloride 0.45% with potassium chloride 20 mEq/L 1000 milliLiter(s) IV Continuous <Continuous>  sodium phosphate 30 milliMole(s)/500 mL IVPB 30 milliMole(s) IV Intermittent once  sucralfate 1 Gram(s) Oral every 12 hours  ticagrelor 60 milliGRAM(s) Enteral Tube every 12 hours      VITAL:  T(C): , Max: 37 (01-06-24 @ 21:06)  T(F): , Max: 98.6 (01-06-24 @ 21:06)  HR: 86 (01-07-24 @ 05:45)  BP: 180/82 (01-07-24 @ 05:45)  BP(mean): --  RR: 19 (01-07-24 @ 05:45)  SpO2: 95% (01-07-24 @ 05:45)  Wt(kg): --    01-06-24 @ 07:01  -  01-07-24 @ 07:00  --------------------------------------------------------  IN: 185 mL / OUT: 0 mL / NET: 185 mL        PHYSICAL EXAM:  General: NAD; Alert  HEENT:  NCAT; PERRLA, +NGT  Neck: No JVD; supple  Respiratory: b/l scattered rales   Cardiac: RRR s1s2  Gastrointestinal: BS+, soft, NT/ND  Urologic: No delong  Extremities: No peripheral edema  Access:     LABS:                          9.2    10.00 )-----------( 312      ( 07 Jan 2024 06:00 )             28.6     Na(137)/K(5.2)/Cl(109)/HCO3(19)/BUN(21)/Cr(1.35)Glu(301)/Ca(7.8)/Mg(2.20)/PO4(2.2)    01-07 @ 06:00  Na(143)/K(5.0)/Cl(111)/HCO3(20)/BUN(20)/Cr(1.49)Glu(195)/Ca(8.2)/Mg(1.90)/PO4(2.7)    01-06 @ 07:35  Na(142)/K(5.1)/Cl(111)/HCO3(19)/BUN(22)/Cr(1.45)Glu(219)/Ca(8.0)/Mg(1.90)/PO4(3.2)    01-05 @ 22:19  Na(140)/K(5.0)/Cl(111)/HCO3(16)/BUN(24)/Cr(1.41)Glu(199)/Ca(8.1)/Mg(1.90)/PO4(3.3)    01-05 @ 01:00    Urinalysis Basic - ( 07 Jan 2024 06:00 )    Color: x / Appearance: x / SG: x / pH: x  Gluc: 301 mg/dL / Ketone: x  / Bili: x / Urobili: x   Blood: x / Protein: x / Nitrite: x   Leuk Esterase: x / RBC: x / WBC x   Sq Epi: x / Non Sq Epi: x / Bacteria: x            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) who presents to the hospital for COVID19 infection and chest pain  MABLE ----improving   Hypophosphatemia - resolved   Hypertensive urgency -resolved --- BP meds as ordered     1 Renal - renal fxn slight up trending , urine lytes   2 Lytes- controlled   3 Hypocalcemia -resolved   4 CV - BP soft stable , on Lopressor 25 mg bid , and amlodipine 5 mg daily  5 Vasc - s/p SMA stent placement;   6 Sx - s/p HIDA + for acute asa, medical management, on puree diet , encourage free water in take too (if allowed  with aspiration precautions)   7 GI - s/p EGD clipping of bleeding duodenal ulcers   8 Anemia- hgb stable         d-w son at bedside  [facility]  Office: [phone]

## 2024-01-15 NOTE — PROGRESS NOTE ADULT - SUBJECTIVE AND OBJECTIVE BOX
Date of Service  : 01-15-24     INTERVAL HPI/OVERNIGHT EVENTS: Said I feel ok. Son in room. sitting in recliner .   Vital Signs Last 24 Hrs  T(C): 36.4 (15 Dmitri 2024 17:33), Max: 37 (15 Dmitri 2024 01:49)  T(F): 97.6 (15 Dmitri 2024 17:33), Max: 98.6 (15 Dmitri 2024 01:49)  HR: 90 (15 Dmitri 2024 17:33) (80 - 91)  BP: 147/54 (15 Dmitri 2024 17:33) (111/63 - 147/54)  BP(mean): --  RR: 18 (15 Dmitri 2024 17:33) (18 - 18)  SpO2: 94% (15 Dmitri 2024 17:33) (94% - 98%)    Parameters below as of 15 Dmitri 2024 17:33  Patient On (Oxygen Delivery Method): nasal cannula  O2 Flow (L/min): 2    I&O's Summary    14 Jan 2024 07:01  -  15 Dmitri 2024 07:00  --------------------------------------------------------  IN: 120 mL / OUT: 0 mL / NET: 120 mL      MEDICATIONS  (STANDING):  albuterol/ipratropium for Nebulization 3 milliLiter(s) Nebulizer every 12 hours  amLODIPine   Tablet 5 milliGRAM(s) Oral daily  aspirin  chewable 81 milliGRAM(s) Oral daily  escitalopram 10 milliGRAM(s) Oral daily  gabapentin Solution 100 milliGRAM(s) Oral two times a day  heparin   Injectable 5000 Unit(s) SubCutaneous every 8 hours  insulin glargine Injectable (LANTUS) 14 Unit(s) SubCutaneous at bedtime  insulin lispro (ADMELOG) corrective regimen sliding scale   SubCutaneous at bedtime  insulin lispro (ADMELOG) corrective regimen sliding scale   SubCutaneous three times a day before meals  levETIRAcetam   Injectable 250 milliGRAM(s) IV Push every 12 hours  melatonin 3 milliGRAM(s) Oral at bedtime  memantine 5 milliGRAM(s) Oral two times a day  metoprolol tartrate 25 milliGRAM(s) Oral two times a day  mometasone 110 MICROgram(s) Inhaler 2 Puff(s) Inhalation every 12 hours  pantoprazole  Injectable 40 milliGRAM(s) IV Push every 12 hours  ranolazine 500 milliGRAM(s) Oral two times a day  sucralfate 1 Gram(s) Oral every 12 hours  ticagrelor 60 milliGRAM(s) Oral every 12 hours    MEDICATIONS  (PRN):  acetaminophen     Tablet .. 650 milliGRAM(s) Oral every 6 hours PRN Temp greater or equal to 38C (100.4F), Mild Pain (1 - 3), Moderate Pain (4 - 6)  albuterol/ipratropium for Nebulization 3 milliLiter(s) Nebulizer every 6 hours PRN Shortness of Breath and/or Wheezing  guaiFENesin Oral Liquid (Sugar-Free) 100 milliGRAM(s) Oral every 6 hours PRN Cough    LABS:              CAPILLARY BLOOD GLUCOSE      POCT Blood Glucose.: 218 mg/dL (15 Dmitri 2024 18:00)  POCT Blood Glucose.: 188 mg/dL (15 Dmitri 2024 12:24)  POCT Blood Glucose.: 157 mg/dL (15 Dmitri 2024 09:14)  POCT Blood Glucose.: 188 mg/dL (14 Jan 2024 22:12)  POCT Blood Glucose.: 150 mg/dL (14 Jan 2024 21:04)              RADIOLOGY & ADDITIONAL TESTS:    Consultant(s) Notes Reviewed:  [x ] YES  [ ] NO    PHYSICAL EXAM:  GENERAL: Not in any distress , Oxygen NC   HEAD:  Atraumatic, Normocephalic  NECK: Supple, No JVD, Normal thyroid  NERVOUS SYSTEM:  Alert & Oriented X3, No focal deficit   CHEST/LUNG: Good air entry bilateral except bases   HEART: Regular rate and rhythm; No murmurs, rubs, or gallops  ABDOMEN: Soft, Nontender, Nondistended; Bowel sounds present  EXTREMITIES:  + edema    Care Discussed with Consultants/Other Providers [ x] YES  [ ] NO

## 2024-01-16 LAB
ANION GAP SERPL CALC-SCNC: 10 MMOL/L — SIGNIFICANT CHANGE UP (ref 7–14)
BUN SERPL-MCNC: 16 MG/DL — SIGNIFICANT CHANGE UP (ref 7–23)
CALCIUM SERPL-MCNC: 8.4 MG/DL — SIGNIFICANT CHANGE UP (ref 8.4–10.5)
CHLORIDE SERPL-SCNC: 98 MMOL/L — SIGNIFICANT CHANGE UP (ref 98–107)
CO2 SERPL-SCNC: 31 MMOL/L — SIGNIFICANT CHANGE UP (ref 22–31)
CREAT SERPL-MCNC: 1.58 MG/DL — HIGH (ref 0.5–1.3)
EGFR: 41 ML/MIN/1.73M2 — LOW
GLUCOSE BLDC GLUCOMTR-MCNC: 116 MG/DL — HIGH (ref 70–99)
GLUCOSE BLDC GLUCOMTR-MCNC: 176 MG/DL — HIGH (ref 70–99)
GLUCOSE BLDC GLUCOMTR-MCNC: 178 MG/DL — HIGH (ref 70–99)
GLUCOSE BLDC GLUCOMTR-MCNC: 207 MG/DL — HIGH (ref 70–99)
GLUCOSE SERPL-MCNC: 147 MG/DL — HIGH (ref 70–99)
HCT VFR BLD CALC: 30.6 % — LOW (ref 39–50)
HGB BLD-MCNC: 9.6 G/DL — LOW (ref 13–17)
MAGNESIUM SERPL-MCNC: 1.7 MG/DL — SIGNIFICANT CHANGE UP (ref 1.6–2.6)
MCHC RBC-ENTMCNC: 30.2 PG — SIGNIFICANT CHANGE UP (ref 27–34)
MCHC RBC-ENTMCNC: 31.4 GM/DL — LOW (ref 32–36)
MCV RBC AUTO: 96.2 FL — SIGNIFICANT CHANGE UP (ref 80–100)
NRBC # BLD: 0 /100 WBCS — SIGNIFICANT CHANGE UP (ref 0–0)
NRBC # FLD: 0 K/UL — SIGNIFICANT CHANGE UP (ref 0–0)
PHOSPHATE SERPL-MCNC: 2.2 MG/DL — LOW (ref 2.5–4.5)
PLATELET # BLD AUTO: 298 K/UL — SIGNIFICANT CHANGE UP (ref 150–400)
POTASSIUM SERPL-MCNC: 3.3 MMOL/L — LOW (ref 3.5–5.3)
POTASSIUM SERPL-SCNC: 3.3 MMOL/L — LOW (ref 3.5–5.3)
RBC # BLD: 3.18 M/UL — LOW (ref 4.2–5.8)
RBC # FLD: 18.5 % — HIGH (ref 10.3–14.5)
SODIUM SERPL-SCNC: 139 MMOL/L — SIGNIFICANT CHANGE UP (ref 135–145)
TSH SERPL-MCNC: 1.78 UIU/ML — SIGNIFICANT CHANGE UP (ref 0.27–4.2)
WBC # BLD: 7.1 K/UL — SIGNIFICANT CHANGE UP (ref 3.8–10.5)
WBC # FLD AUTO: 7.1 K/UL — SIGNIFICANT CHANGE UP (ref 3.8–10.5)

## 2024-01-16 PROCEDURE — 71250 CT THORAX DX C-: CPT | Mod: 26

## 2024-01-16 PROCEDURE — 70450 CT HEAD/BRAIN W/O DYE: CPT | Mod: 26

## 2024-01-16 PROCEDURE — 99222 1ST HOSP IP/OBS MODERATE 55: CPT

## 2024-01-16 RX ORDER — SODIUM CHLORIDE 9 MG/ML
1000 INJECTION INTRAMUSCULAR; INTRAVENOUS; SUBCUTANEOUS
Refills: 0 | Status: DISCONTINUED | OUTPATIENT
Start: 2024-01-16 | End: 2024-01-17

## 2024-01-16 RX ORDER — POTASSIUM CHLORIDE 20 MEQ
20 PACKET (EA) ORAL ONCE
Refills: 0 | Status: COMPLETED | OUTPATIENT
Start: 2024-01-16 | End: 2024-01-16

## 2024-01-16 RX ORDER — POTASSIUM CHLORIDE 20 MEQ
20 PACKET (EA) ORAL EVERY 4 HOURS
Refills: 0 | Status: DISCONTINUED | OUTPATIENT
Start: 2024-01-16 | End: 2024-01-16

## 2024-01-16 RX ORDER — SODIUM,POTASSIUM PHOSPHATES 278-250MG
1 POWDER IN PACKET (EA) ORAL ONCE
Refills: 0 | Status: COMPLETED | OUTPATIENT
Start: 2024-01-16 | End: 2024-01-16

## 2024-01-16 RX ORDER — MAGNESIUM SULFATE 500 MG/ML
1 VIAL (ML) INJECTION ONCE
Refills: 0 | Status: COMPLETED | OUTPATIENT
Start: 2024-01-16 | End: 2024-01-16

## 2024-01-16 RX ADMIN — GABAPENTIN 100 MILLIGRAM(S): 400 CAPSULE ORAL at 19:09

## 2024-01-16 RX ADMIN — Medication 2: at 09:24

## 2024-01-16 RX ADMIN — Medication 1 GRAM(S): at 19:11

## 2024-01-16 RX ADMIN — Medication 3 MILLILITER(S): at 09:53

## 2024-01-16 RX ADMIN — INSULIN GLARGINE 14 UNIT(S): 100 INJECTION, SOLUTION SUBCUTANEOUS at 21:52

## 2024-01-16 RX ADMIN — Medication 25 MILLIGRAM(S): at 05:25

## 2024-01-16 RX ADMIN — ESCITALOPRAM OXALATE 10 MILLIGRAM(S): 10 TABLET, FILM COATED ORAL at 11:15

## 2024-01-16 RX ADMIN — MEMANTINE HYDROCHLORIDE 5 MILLIGRAM(S): 10 TABLET ORAL at 19:11

## 2024-01-16 RX ADMIN — Medication 3 MILLIGRAM(S): at 21:53

## 2024-01-16 RX ADMIN — Medication 1 PACKET(S): at 11:14

## 2024-01-16 RX ADMIN — PANTOPRAZOLE SODIUM 40 MILLIGRAM(S): 20 TABLET, DELAYED RELEASE ORAL at 19:10

## 2024-01-16 RX ADMIN — Medication 3 MILLILITER(S): at 21:21

## 2024-01-16 RX ADMIN — RANOLAZINE 500 MILLIGRAM(S): 500 TABLET, FILM COATED, EXTENDED RELEASE ORAL at 19:10

## 2024-01-16 RX ADMIN — RANOLAZINE 500 MILLIGRAM(S): 500 TABLET, FILM COATED, EXTENDED RELEASE ORAL at 05:24

## 2024-01-16 RX ADMIN — Medication 100 GRAM(S): at 11:14

## 2024-01-16 RX ADMIN — TICAGRELOR 60 MILLIGRAM(S): 90 TABLET ORAL at 19:11

## 2024-01-16 RX ADMIN — Medication 1 GRAM(S): at 05:24

## 2024-01-16 RX ADMIN — PANTOPRAZOLE SODIUM 40 MILLIGRAM(S): 20 TABLET, DELAYED RELEASE ORAL at 05:25

## 2024-01-16 RX ADMIN — Medication 2: at 12:52

## 2024-01-16 RX ADMIN — LEVETIRACETAM 250 MILLIGRAM(S): 250 TABLET, FILM COATED ORAL at 05:26

## 2024-01-16 RX ADMIN — Medication 25 MILLIGRAM(S): at 19:11

## 2024-01-16 RX ADMIN — HEPARIN SODIUM 5000 UNIT(S): 5000 INJECTION INTRAVENOUS; SUBCUTANEOUS at 05:23

## 2024-01-16 RX ADMIN — Medication 81 MILLIGRAM(S): at 11:15

## 2024-01-16 RX ADMIN — GABAPENTIN 100 MILLIGRAM(S): 400 CAPSULE ORAL at 05:25

## 2024-01-16 RX ADMIN — Medication 20 MILLIEQUIVALENT(S): at 11:14

## 2024-01-16 RX ADMIN — TICAGRELOR 60 MILLIGRAM(S): 90 TABLET ORAL at 05:24

## 2024-01-16 RX ADMIN — HEPARIN SODIUM 5000 UNIT(S): 5000 INJECTION INTRAVENOUS; SUBCUTANEOUS at 21:52

## 2024-01-16 RX ADMIN — Medication 100 MILLIGRAM(S): at 19:08

## 2024-01-16 RX ADMIN — MOMETASONE FUROATE 2 PUFF(S): 220 INHALANT RESPIRATORY (INHALATION) at 21:52

## 2024-01-16 RX ADMIN — AMLODIPINE BESYLATE 5 MILLIGRAM(S): 2.5 TABLET ORAL at 05:24

## 2024-01-16 RX ADMIN — HEPARIN SODIUM 5000 UNIT(S): 5000 INJECTION INTRAVENOUS; SUBCUTANEOUS at 15:29

## 2024-01-16 RX ADMIN — MEMANTINE HYDROCHLORIDE 5 MILLIGRAM(S): 10 TABLET ORAL at 05:24

## 2024-01-16 RX ADMIN — Medication 100 MILLIGRAM(S): at 09:23

## 2024-01-16 RX ADMIN — LEVETIRACETAM 250 MILLIGRAM(S): 250 TABLET, FILM COATED ORAL at 19:10

## 2024-01-16 NOTE — PROGRESS NOTE ADULT - ASSESSMENT
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 Patient states that he has been having a dry cough for the past week, worsened over the past few days. Denies SOB with the cough, denies hemoptysis. Reports fevers at home to 101.5 with associated diaphoresis. Said that he has significant pinprick like b/l chest pain with his cough but denies any chest pain when not coughing or with ambulation. Also reports rhinorrhea and sore throat. Reports generalized weakness and poor appetite. States his daughter was visiting him over the week end last week and she was positive for COVID19. Patient tested positive for COVID19 2 days prior and was started on paxlovid as outpatient. Of note, family states that the patient has had worsening confusion at home over the past 6 months (becoming disoriented to time) but recently has been more confused, talking about seeing people that are not present.   On arrival to the ED his vitals were T 98 -> 99.2, P 80, /72, RR 18, ) sat 100% RA. His lab work showed leukocytosis with neutrophilia and bandemia, MABLE, mild hyponatremia, indeterminant but stable troponins, and elevated lactate to 2.3. His COVID19 swab was positive. His CXR showed bibasilar crackles.  (23 Dec 2023 22:51):  pt has been here for past two weeks and now pulm called fro excessive secretions   he is able to answer simple questions:  grand sons at bedside:     Covid / Resp failure/on 2 L of oxygen  :  CADS/P CABG  DM  CKD  HTN  CVA X 3    Covid / Resp failure/on 2 L of oxygen:  -he was treated for covid before:   -he has excessive secretions  -keep o2 sao2 above 90%  -add BD and mucomyst: ;  -add mucinex:   1/10: he seems to be doing  ok: cont mucomyst and bd   1/11 ?: add benzonatate and Robitussin prm : dc dornase  1/12: seems OK: no sob:  no cough : no phlegm : has gurgling sound in throat:  looks comfortable  1/14: he is on room air:  with good sao2:  finished covid rx:  cont current rx:  high risk for aspiration as he has gurgling secretions in throat   1/15: would do ct chest he seems to have increasing effusions:  his ct scan from last week of december:  has not much of effusions: however will try lasix : [facility]    1/16: doing  ok : no os:b has secretions still : change to percussion bed:  cont bd  CADS/P CABG  -cont current medications   DM  monitor and control   CKD  -cont current meds   HTN   -controlled  CVA X 3  -doing ok :     [facility]  and team  GI Bleed:   -secondary to Dieulafoy's lesion:  defer to primary team :    dvt prophylaxis    [facility]

## 2024-01-16 NOTE — PROGRESS NOTE ADULT - ASSESSMENT
on distress   on  puree  diet   family at bedside  acetaminophen   IVPB .. 1000 milliGRAM(s) IV Intermittent every 6 hours PRN  albuterol/ipratropium for Nebulization 3 milliLiter(s) Nebulizer every 12 hours  amLODIPine   Tablet 5 milliGRAM(s) Oral daily  aspirin  chewable 81 milliGRAM(s) Oral daily  dornase cierra Solution 2.5 milliGRAM(s) Inhalation every 12 hours  escitalopram 10 milliGRAM(s) Oral daily  gabapentin Solution 100 milliGRAM(s) Oral two times a day  heparin   Injectable 5000 Unit(s) SubCutaneous every 8 hours  insulin glargine Injectable (LANTUS) 14 Unit(s) SubCutaneous at bedtime  insulin lispro (ADMELOG) corrective regimen sliding scale   SubCutaneous every 6 hours  levETIRAcetam   Injectable 250 milliGRAM(s) IV Push every 12 hours  melatonin 3 milliGRAM(s) Oral at bedtime  memantine 5 milliGRAM(s) Oral two times a day  metoprolol tartrate Injectable 5 milliGRAM(s) IV Push every 6 hours  mometasone 110 MICROgram(s) Inhaler 2 Puff(s) Inhalation every 12 hours  pantoprazole  Injectable 40 milliGRAM(s) IV Push every 12 hours  ranolazine 500 milliGRAM(s) Oral two times a day  sodium chloride 0.45% with potassium chloride 20 mEq/L 1000 milliLiter(s) IV Continuous <Continuous>  sodium phosphate 30 milliMole(s)/500 mL IVPB 30 milliMole(s) IV Intermittent once  sucralfate 1 Gram(s) Oral every 12 hours  ticagrelor 60 milliGRAM(s) Enteral Tube every 12 hours      VITAL:  T(C): , Max: 37 (01-06-24 @ 21:06)  T(F): , Max: 98.6 (01-06-24 @ 21:06)  HR: 86 (01-07-24 @ 05:45)  BP: 180/82 (01-07-24 @ 05:45)  BP(mean): --  RR: 19 (01-07-24 @ 05:45)  SpO2: 95% (01-07-24 @ 05:45)  Wt(kg): --    01-06-24 @ 07:01  -  01-07-24 @ 07:00  --------------------------------------------------------  IN: 185 mL / OUT: 0 mL / NET: 185 mL        PHYSICAL EXAM:  General: NAD; Alert  HEENT:  NCAT; PERRLA, +NGT  Neck: No JVD; supple  Respiratory: b/l scattered rales   Cardiac: RRR s1s2  Gastrointestinal: BS+, soft, NT/ND  Urologic: No delong  Extremities: No peripheral edema  Access:     LABS:                          9.2    10.00 )-----------( 312      ( 07 Jan 2024 06:00 )             28.6     Na(137)/K(5.2)/Cl(109)/HCO3(19)/BUN(21)/Cr(1.35)Glu(301)/Ca(7.8)/Mg(2.20)/PO4(2.2)    01-07 @ 06:00  Na(143)/K(5.0)/Cl(111)/HCO3(20)/BUN(20)/Cr(1.49)Glu(195)/Ca(8.2)/Mg(1.90)/PO4(2.7)    01-06 @ 07:35  Na(142)/K(5.1)/Cl(111)/HCO3(19)/BUN(22)/Cr(1.45)Glu(219)/Ca(8.0)/Mg(1.90)/PO4(3.2)    01-05 @ 22:19  Na(140)/K(5.0)/Cl(111)/HCO3(16)/BUN(24)/Cr(1.41)Glu(199)/Ca(8.1)/Mg(1.90)/PO4(3.3)    01-05 @ 01:00    Urinalysis Basic - ( 07 Jan 2024 06:00 )    Color: x / Appearance: x / SG: x / pH: x  Gluc: 301 mg/dL / Ketone: x  / Bili: x / Urobili: x   Blood: x / Protein: x / Nitrite: x   Leuk Esterase: x / RBC: x / WBC x   Sq Epi: x / Non Sq Epi: x / Bacteria: x            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) who presents to the hospital for COVID19 infection and chest pain  MABLE ----improving   Hypophosphatemia - resolved   Hypertensive urgency -resolved --- BP meds as ordered     1 Renal - renal fxn slight up trending , give ns 75 c/h for 10h   2 hypokalemia-  repleted  3 Hypocalcemia -resolved   4 CV - BP soft stable , on Lopressor 25 mg bid , and amlodipine 5 mg daily  5 Vasc - s/p SMA stent placement;   6 Sx - s/p HIDA + for acute asa, medical management, on puree diet , encourage free water in take too (if allowed  with aspiration precautions)   7 GI - s/p EGD clipping of bleeding duodenal ulcers   8 Anemia- hgb stable         d-w son at bedside  [facility]  Office: [phone]     on distress   on  puree  diet   family at bedside  acetaminophen   IVPB .. 1000 milliGRAM(s) IV Intermittent every 6 hours PRN  albuterol/ipratropium for Nebulization 3 milliLiter(s) Nebulizer every 12 hours  amLODIPine   Tablet 5 milliGRAM(s) Oral daily  aspirin  chewable 81 milliGRAM(s) Oral daily  dornase cierra Solution 2.5 milliGRAM(s) Inhalation every 12 hours  escitalopram 10 milliGRAM(s) Oral daily  gabapentin Solution 100 milliGRAM(s) Oral two times a day  heparin   Injectable 5000 Unit(s) SubCutaneous every 8 hours  insulin glargine Injectable (LANTUS) 14 Unit(s) SubCutaneous at bedtime  insulin lispro (ADMELOG) corrective regimen sliding scale   SubCutaneous every 6 hours  levETIRAcetam   Injectable 250 milliGRAM(s) IV Push every 12 hours  melatonin 3 milliGRAM(s) Oral at bedtime  memantine 5 milliGRAM(s) Oral two times a day  metoprolol tartrate Injectable 5 milliGRAM(s) IV Push every 6 hours  mometasone 110 MICROgram(s) Inhaler 2 Puff(s) Inhalation every 12 hours  pantoprazole  Injectable 40 milliGRAM(s) IV Push every 12 hours  ranolazine 500 milliGRAM(s) Oral two times a day  sodium chloride 0.45% with potassium chloride 20 mEq/L 1000 milliLiter(s) IV Continuous <Continuous>  sodium phosphate 30 milliMole(s)/500 mL IVPB 30 milliMole(s) IV Intermittent once  sucralfate 1 Gram(s) Oral every 12 hours  ticagrelor 60 milliGRAM(s) Enteral Tube every 12 hours      VITAL:  T(C): , Max: 37 (01-06-24 @ 21:06)  T(F): , Max: 98.6 (01-06-24 @ 21:06)  HR: 86 (01-07-24 @ 05:45)  BP: 180/82 (01-07-24 @ 05:45)  BP(mean): --  RR: 19 (01-07-24 @ 05:45)  SpO2: 95% (01-07-24 @ 05:45)  Wt(kg): --    01-06-24 @ 07:01  -  01-07-24 @ 07:00  --------------------------------------------------------  IN: 185 mL / OUT: 0 mL / NET: 185 mL        PHYSICAL EXAM:  General: NAD; Alert  HEENT:  NCAT; PERRLA, +NGT  Neck: No JVD; supple  Respiratory: b/l scattered rales   Cardiac: RRR s1s2  Gastrointestinal: BS+, soft, NT/ND  Urologic: No delong  Extremities: No peripheral edema  Access:     LABS:                          9.2    10.00 )-----------( 312      ( 07 Jan 2024 06:00 )             28.6     Na(137)/K(5.2)/Cl(109)/HCO3(19)/BUN(21)/Cr(1.35)Glu(301)/Ca(7.8)/Mg(2.20)/PO4(2.2)    01-07 @ 06:00  Na(143)/K(5.0)/Cl(111)/HCO3(20)/BUN(20)/Cr(1.49)Glu(195)/Ca(8.2)/Mg(1.90)/PO4(2.7)    01-06 @ 07:35  Na(142)/K(5.1)/Cl(111)/HCO3(19)/BUN(22)/Cr(1.45)Glu(219)/Ca(8.0)/Mg(1.90)/PO4(3.2)    01-05 @ 22:19  Na(140)/K(5.0)/Cl(111)/HCO3(16)/BUN(24)/Cr(1.41)Glu(199)/Ca(8.1)/Mg(1.90)/PO4(3.3)    01-05 @ 01:00    Urinalysis Basic - ( 07 Jan 2024 06:00 )    Color: x / Appearance: x / SG: x / pH: x  Gluc: 301 mg/dL / Ketone: x  / Bili: x / Urobili: x   Blood: x / Protein: x / Nitrite: x   Leuk Esterase: x / RBC: x / WBC x   Sq Epi: x / Non Sq Epi: x / Bacteria: x            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) who presents to the hospital for COVID19 infection and chest pain  MABLE ----improving   Hypophosphatemia - resolved   Hypertensive urgency -resolved --- BP meds as ordered     1 Renal - renal fxn slight up trending , give ns 75 c/h for 10h   2 hypokalemia-  repleted  3 Hypocalcemia -resolved   4 CV - BP soft stable , on Lopressor 25 mg bid , and amlodipine 5 mg daily  5 Vasc - s/p SMA stent placement;   6 Sx - s/p HIDA + for acute asa, medical management, on puree diet , encourage free water in take too (if allowed  with aspiration precautions)   7 GI - s/p EGD clipping of bleeding duodenal ulcers   8 Anemia- hgb stable         d-w son at bedside  [facility]  Office: [phone]

## 2024-01-16 NOTE — CONSULT NOTE ADULT - SUBJECTIVE AND OBJECTIVE BOX
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 Patient states that he has been having a dry cough for the past week, worsened over the past few days. Denies SOB with the cough, denies hemoptysis. Reports fevers at home to 101.5 with associated diaphoresis. Said that he has significant pinprick like b/l chest pain with his cough but denies any chest pain when not coughing or with ambulation. Also reports rhinorrhea and sore throat. Reports generalized weakness and poor appetite. States his daughter was visiting him over the week end last week and she was positive for COVID19. Patient tested positive for COVID19 2 days prior and was started on paxlovid as outpatient. Of note, family states that the patient has had worsening confusion at home over the past 6 months (becoming disoriented to time) but recently has been more confused, talking about seeing people that are not present.     On arrival to the ED his vitals were T 98 -> 99.2, P 80, /72, RR 18, ) sat 100% RA. His lab work showed leukocytosis with neutrophilia and bandemia, MABLE, mild hyponatremia, indeterminant but stable troponins, and elevated lactate to 2.3. His COVID19 swab was positive. His CXR showed bibasilar crackles.  (23 Dec 2023 22:51)      REVIEW OF SYSTEMS  Constitutional - No fever, No weight loss, No fatigue  HEENT - No eye pain, No visual disturbances, No difficulty hearing, No tinnitus, No vertigo, No neck pain  Respiratory - No cough, No wheezing, No shortness of breath  Cardiovascular - No chest pain, No palpitations  Gastrointestinal - No abdominal pain, No nausea, No vomiting, No diarrhea, No constipation  Genitourinary - No dysuria, No frequency, No hematuria, No incontinence  Neurological - No headaches, No memory loss, No loss of strength, No numbness, No tremors  Skin - No itching, No rashes, No lesions   Endocrine - No temperature intolerance  Musculoskeletal - No joint pain, No joint swelling, No muscle pain  Psychiatric - No depression, No anxiety    PAST MEDICAL & SURGICAL HISTORY  Hyperlipemia    Hypertension    Coronary Artery Disease    Diabetes Mellitus Type II    Stented Coronary Artery    Neuropathy    Myocardial infarction    Stroke    Myoclonic jerking    Stage 3 chronic kidney disease    History of Cataract Extraction    Hx of CABG    H/O coronary angiogram    S/P coronary artery stent placement    S/P placement of cardiac pacemaker        SOCIAL HISTORY  Smoking - Denied  EtOH - Denied   Drugs - Denied    FUNCTIONAL HISTORY  Lives   Independent    CURRENT FUNCTIONAL STATUS  1/15  Bed Mobility  Bed Mobility Training Rehab Potential: good, to achieve stated therapy goals  Bed Mobility Training Supine-to-Sit: moderate assist (50% patient effort);  1 person assist;  nonverbal cues (demo/gestures);  verbal cues  Bed Mobility Training Limitations: impaired balance;  decreased strength;  impaired postural control    Bed-Chair Transfer Training  Transfer Training Bed-to-Chair Transfer: pt. was transferred from bed to chair with use of [employer] Steady.     Sit-Stand Transfer Training  Sit-to-Stand Transfer Training Rehab Potential: good, to achieve stated therapy goals  Transfer Training Sit-to-Stand Transfer: moderate assist (50% patient effort);  2 person assist;  nonverbal cues (demo/gestures);  verbal cues;  weight-bearing as tolerated   rolling walker  Transfer Training Stand-to-Sit Transfer: moderate assist (50% patient effort);  2 person assist;  nonverbal cues (demo/gestures);  verbal cues;  weight-bearing as tolerated   rolling walker  Sit-to-Stand Transfer Training Transfer Safety Analysis: impaired balance;  decreased strength;  rolling walker    Gait Training  Gait Training Treatment not Performed: pt. unable to ambulate at this time secondary to weakness     Therapeutic Exercise  Therapeutic Exercise Rehab Effort: good  Therapeutic Exercise Detail: Therapeutic exercise performed in bed; active/active assistive ROM; bilateral hip flexion 1 x 10, knee flexion 1 x 10, ankle pumps 1 x 10           FAMILY HISTORY   No pertinent family history in first degree relatives        RECENT LABS/IMAGING  CBC Full  -  ( 16 Jan 2024 06:28 )  WBC Count : 7.10 K/uL  RBC Count : 3.18 M/uL  Hemoglobin : 9.6 g/dL  Hematocrit : 30.6 %  Platelet Count - Automated : 298 K/uL  Mean Cell Volume : 96.2 fL  Mean Cell Hemoglobin : 30.2 pg  Mean Cell Hemoglobin Concentration : 31.4 gm/dL  Auto Neutrophil # : x  Auto Lymphocyte # : x  Auto Monocyte # : x  Auto Eosinophil # : x  Auto Basophil # : x  Auto Neutrophil % : x  Auto Lymphocyte % : x  Auto Monocyte % : x  Auto Eosinophil % : x  Auto Basophil % : x    01-16    139  |  98  |  16  ----------------------------<  147<H>  3.3<L>   |  31  |  1.58<H>    Ca    8.4      16 Jan 2024 06:28  Phos  2.2     01-16  Mg     1.70     01-16      Urinalysis Basic - ( 16 Jan 2024 06:28 )    Color: x / Appearance: x / SG: x / pH: x  Gluc: 147 mg/dL / Ketone: x  / Bili: x / Urobili: x   Blood: x / Protein: x / Nitrite: x   Leuk Esterase: x / RBC: x / WBC x   Sq Epi: x / Non Sq Epi: x / Bacteria: x        VITALS  T(C): 36.8 (01-16-24 @ 05:21), Max: 37.3 (01-15-24 @ 21:27)  HR: 87 (01-16-24 @ 05:21) (79 - 90)  BP: 126/52 (01-16-24 @ 05:21) (123/51 - 147/54)  RR: 18 (01-16-24 @ 05:21) (18 - 18)  SpO2: 100% (01-16-24 @ 05:21) (94% - 100%)  Wt(kg): --    ALLERGIES  fluoroquinolone antibiotics (Other)  Cipro (Unknown)  Tegretol (Unknown)  carbamazepine (Other)      MEDICATIONS   acetaminophen     Tablet .. 650 milliGRAM(s) Oral every 6 hours PRN  albuterol/ipratropium for Nebulization 3 milliLiter(s) Nebulizer every 12 hours  albuterol/ipratropium for Nebulization 3 milliLiter(s) Nebulizer every 6 hours PRN  amLODIPine   Tablet 5 milliGRAM(s) Oral daily  aspirin  chewable 81 milliGRAM(s) Oral daily  escitalopram 10 milliGRAM(s) Oral daily  gabapentin Solution 100 milliGRAM(s) Oral two times a day  guaiFENesin Oral Liquid (Sugar-Free) 100 milliGRAM(s) Oral every 6 hours PRN  heparin   Injectable 5000 Unit(s) SubCutaneous every 8 hours  insulin glargine Injectable (LANTUS) 14 Unit(s) SubCutaneous at bedtime  insulin lispro (ADMELOG) corrective regimen sliding scale   SubCutaneous at bedtime  insulin lispro (ADMELOG) corrective regimen sliding scale   SubCutaneous three times a day before meals  levETIRAcetam   Injectable 250 milliGRAM(s) IV Push every 12 hours  melatonin 3 milliGRAM(s) Oral at bedtime  memantine 5 milliGRAM(s) Oral two times a day  metoprolol tartrate 25 milliGRAM(s) Oral two times a day  mometasone 110 MICROgram(s) Inhaler 2 Puff(s) Inhalation every 12 hours  pantoprazole  Injectable 40 milliGRAM(s) IV Push every 12 hours  ranolazine 500 milliGRAM(s) Oral two times a day  sucralfate 1 Gram(s) Oral every 12 hours  ticagrelor 60 milliGRAM(s) Oral every 12 hours      ----------------------------------------------------------------------------------------  PHYSICAL EXAM  Constitutional - NAD, Comfortable  HEENT - NCAT, EOMI  Neck - Supple, No limited ROM  Chest - CTA bilaterally, No wheeze, No rhonchi, No crackles  Cardiovascular - RRR, S1S2, No murmurs  Abdomen - BS+, Soft, NTND  Extremities - No C/C/E, No calf tenderness   Neurologic Exam -                    Cognitive - Awake, Alert, AAO to self, place, date, year, situation     Communication - Fluent, No dysarthria     Cranial Nerves - CN 2-12 intact     Motor - No focal deficits                    LEFT    UE - ShAB 5/5, EF 5/5, EE 5/5, WE 5/5,  5/5                    RIGHT UE - ShAB 5/5, EF 5/5, EE 5/5, WE 5/5,  5/5                    LEFT    LE - HF 5/5, KE 5/5, DF 5/5, PF 5/5                    RIGHT LE - HF 5/5, KE 5/5, DF 5/5, PF 5/5        Sensory - Intact to LT     Reflexes - DTR Intact, No primitive reflexive     Coordination - FTN intact     OculoVestibular - No saccades, No nystagmus, VOR         Balance - WNL Static  Psychiatric - Mood stable, Affect WNL  ----------------------------------------------------------------------------------------  ASSESSMENT/PLAN    Pain -  DVT PPX -   Rehab -     incomplete note, consult in progress Patient is a 89y old  Male who presents with a chief complaint of COVID19, Chest pain (15 Dmitri 2024 14:02)      HPI:  This is a 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 Patient states that he has been having a dry cough for the past week, worsened over the past few days. Denies SOB with the cough, denies hemoptysis. Reports fevers at home to 101.5 with associated diaphoresis. Said that he has significant pinprick like b/l chest pain with his cough but denies any chest pain when not coughing or with ambulation. Also reports rhinorrhea and sore throat. Reports generalized weakness and poor appetite. States his daughter was visiting him over the week end last week and she was positive for COVID19. Patient tested positive for COVID19 2 days prior and was started on paxlovid as outpatient. Of note, family states that the patient has had worsening confusion at home over the past 6 months (becoming disoriented to time) but recently has been more confused, talking about seeing people that are not present.     On arrival to the ED his vitals were T 98 -> 99.2, P 80, /72, RR 18, ) sat 100% RA. His lab work showed leukocytosis with neutrophilia and bandemia, MABLE, mild hyponatremia, indeterminant but stable troponins, and elevated lactate to 2.3. His COVID19 swab was positive. His CXR showed bibasilar crackles.  (23 Dec 2023 22:51)    Patient with son at bedside. Patient sleeping but awakens to voice, follows some commands but not answering questions. Patient's son reports he was living with family, ambulated with RW at baseline with supervision, and had home health aide. Patient had decline in function and has been in the hospital over 3 weeks, had recent covid and presented for abdominal pain.  Now requires 2 person assist for transfers.  Follow up PT pending.      REVIEW OF SYSTEMS- limited participation in ROS    PAST MEDICAL & SURGICAL HISTORY  Hyperlipemia    Hypertension    Coronary Artery Disease    Diabetes Mellitus Type II    Stented Coronary Artery    Neuropathy    Myocardial infarction    Stroke    Myoclonic jerking    Stage 3 chronic kidney disease    History of Cataract Extraction    Hx of CABG    H/O coronary angiogram    S/P coronary artery stent placement    S/P placement of cardiac pacemaker        SOCIAL HISTORY  Smoking - Denied  EtOH - Denied   Drugs - Denied    FUNCTIONAL HISTORY  Lives with family in home with chair lift  ambulated short distances with RW, with assistance    CURRENT FUNCTIONAL STATUS  1/15  Bed Mobility  Bed Mobility Training Rehab Potential: good, to achieve stated therapy goals  Bed Mobility Training Supine-to-Sit: moderate assist (50% patient effort);  1 person assist;  nonverbal cues (demo/gestures);  verbal cues  Bed Mobility Training Limitations: impaired balance;  decreased strength;  impaired postural control    Bed-Chair Transfer Training  Transfer Training Bed-to-Chair Transfer: pt. was transferred from bed to chair with use of [employer] Ste[employer].     Sit-Stand Transfer Training  Sit-to-Stand Transfer Training Rehab Potential: good, to achieve stated therapy goals  Transfer Training Sit-to-Stand Transfer: moderate assist (50% patient effort);  2 person assist;  nonverbal cues (demo/gestures);  verbal cues;  weight-bearing as tolerated   rolling walker  Transfer Training Stand-to-Sit Transfer: moderate assist (50% patient effort);  2 person assist;  nonverbal cues (demo/gestures);  verbal cues;  weight-bearing as tolerated   rolling walker  Sit-to-Stand Transfer Training Transfer Safety Analysis: impaired balance;  decreased strength;  rolling walker    Gait Training  Gait Training Treatment not Performed: pt. unable to ambulate at this time secondary to weakness     Therapeutic Exercise  Therapeutic Exercise Rehab Effort: good  Therapeutic Exercise Detail: Therapeutic exercise performed in bed; active/active assistive ROM; bilateral hip flexion 1 x 10, knee flexion 1 x 10, ankle pumps 1 x 10           FAMILY HISTORY   No pertinent family history in first degree relatives        RECENT LABS/IMAGING  CBC Full  -  ( 16 Jan 2024 06:28 )  WBC Count : 7.10 K/uL  RBC Count : 3.18 M/uL  Hemoglobin : 9.6 g/dL  Hematocrit : 30.6 %  Platelet Count - Automated : 298 K/uL  Mean Cell Volume : 96.2 fL  Mean Cell Hemoglobin : 30.2 pg  Mean Cell Hemoglobin Concentration : 31.4 gm/dL  Auto Neutrophil # : x  Auto Lymphocyte # : x  Auto Monocyte # : x  Auto Eosinophil # : x  Auto Basophil # : x  Auto Neutrophil % : x  Auto Lymphocyte % : x  Auto Monocyte % : x  Auto Eosinophil % : x  Auto Basophil % : x    01-16    139  |  98  |  16  ----------------------------<  147<H>  3.3<L>   |  31  |  1.58<H>    Ca    8.4      16 Jan 2024 06:28  Phos  2.2     01-16  Mg     1.70     01-16      Urinalysis Basic - ( 16 Jan 2024 06:28 )    Color: x / Appearance: x / SG: x / pH: x  Gluc: 147 mg/dL / Ketone: x  / Bili: x / Urobili: x   Blood: x / Protein: x / Nitrite: x   Leuk Esterase: x / RBC: x / WBC x   Sq Epi: x / Non Sq Epi: x / Bacteria: x        VITALS  T(C): 36.8 (01-16-24 @ 05:21), Max: 37.3 (01-15-24 @ 21:27)  HR: 87 (01-16-24 @ 05:21) (79 - 90)  BP: 126/52 (01-16-24 @ 05:21) (123/51 - 147/54)  RR: 18 (01-16-24 @ 05:21) (18 - 18)  SpO2: 100% (01-16-24 @ 05:21) (94% - 100%)  Wt(kg): --    ALLERGIES  fluoroquinolone antibiotics (Other)  Cipro (Unknown)  Tegretol (Unknown)  carbamazepine (Other)      MEDICATIONS   acetaminophen     Tablet .. 650 milliGRAM(s) Oral every 6 hours PRN  albuterol/ipratropium for Nebulization 3 milliLiter(s) Nebulizer every 12 hours  albuterol/ipratropium for Nebulization 3 milliLiter(s) Nebulizer every 6 hours PRN  amLODIPine   Tablet 5 milliGRAM(s) Oral daily  aspirin  chewable 81 milliGRAM(s) Oral daily  escitalopram 10 milliGRAM(s) Oral daily  gabapentin Solution 100 milliGRAM(s) Oral two times a day  guaiFENesin Oral Liquid (Sugar-Free) 100 milliGRAM(s) Oral every 6 hours PRN  heparin   Injectable 5000 Unit(s) SubCutaneous every 8 hours  insulin glargine Injectable (LANTUS) 14 Unit(s) SubCutaneous at bedtime  insulin lispro (ADMELOG) corrective regimen sliding scale   SubCutaneous at bedtime  insulin lispro (ADMELOG) corrective regimen sliding scale   SubCutaneous three times a day before meals  levETIRAcetam   Injectable 250 milliGRAM(s) IV Push every 12 hours  melatonin 3 milliGRAM(s) Oral at bedtime  memantine 5 milliGRAM(s) Oral two times a day  metoprolol tartrate 25 milliGRAM(s) Oral two times a day  mometasone 110 MICROgram(s) Inhaler 2 Puff(s) Inhalation every 12 hours  pantoprazole  Injectable 40 milliGRAM(s) IV Push every 12 hours  ranolazine 500 milliGRAM(s) Oral two times a day  sucralfate 1 Gram(s) Oral every 12 hours  ticagrelor 60 milliGRAM(s) Oral every 12 hours      ----------------------------------------------------------------------------------------  PHYSICAL EXAM  Constitutional - NAD, Comfortable, falls back to sleep during exam  HEENT - NCAT, EOMI  + NC   Chest - no respiratory distress  Cardiovascular - RRR, S1S2  Abdomen -  Soft, NTND  Extremities - No C/C/E, No calf tenderness   Neurologic Exam -                    Cognitive - Awake, Alert but sleepy     Communication - Fluent      Cranial Nerves - CN 2-12 intact     Motor - follows some commands to squeeze hands, dorsiflex/plantarflex ankles  otherwise not able to lift arms or legs, 0/5     Sensory - Intact to LT      Balance - WNL Static  Psychiatric - calm  ----------------------------------------------------------------------------------------  ASSESSMENT/PLAN  89 year old m pmh cad, dm2, ckd, htn, hld, cva, p/w abdominal pain, covid  per pulm increasing effusions, recommended chest CT  continue bedside PT for bed mobility, transfers, OOB as tolerates  OT for adls  Pain -denies  DVT PPX - heparin  Rehab -   recommend subacute rehab when medically cleared. Will monitor for improvement

## 2024-01-16 NOTE — PROGRESS NOTE ADULT - SUBJECTIVE AND OBJECTIVE BOX
Date of Service  : 01-16-24     INTERVAL HPI/OVERNIGHT EVENTS: More lethargic today .   Vital Signs Last 24 Hrs  T(C): 36.8 (16 Jan 2024 18:28), Max: 37.3 (15 Dmitri 2024 21:27)  T(F): 98.3 (16 Jan 2024 18:28), Max: 99.1 (15 Dmitri 2024 21:27)  HR: 83 (16 Jan 2024 18:28) (79 - 87)  BP: 135/65 (16 Jan 2024 18:28) (126/52 - 139/52)  BP(mean): --  RR: 17 (16 Jan 2024 18:28) (17 - 18)  SpO2: 96% (16 Jan 2024 18:28) (96% - 100%)    Parameters below as of 16 Jan 2024 18:28  Patient On (Oxygen Delivery Method): nasal cannula      I&O's Summary    15 Mditri 2024 07:01  -  16 Jan 2024 07:00  --------------------------------------------------------  IN: 120 mL / OUT: 250 mL / NET: -130 mL      MEDICATIONS  (STANDING):  albuterol/ipratropium for Nebulization 3 milliLiter(s) Nebulizer every 12 hours  amLODIPine   Tablet 5 milliGRAM(s) Oral daily  aspirin  chewable 81 milliGRAM(s) Oral daily  escitalopram 10 milliGRAM(s) Oral daily  gabapentin Solution 100 milliGRAM(s) Oral two times a day  heparin   Injectable 5000 Unit(s) SubCutaneous every 8 hours  insulin glargine Injectable (LANTUS) 14 Unit(s) SubCutaneous at bedtime  insulin lispro (ADMELOG) corrective regimen sliding scale   SubCutaneous at bedtime  insulin lispro (ADMELOG) corrective regimen sliding scale   SubCutaneous three times a day before meals  levETIRAcetam   Injectable 250 milliGRAM(s) IV Push every 12 hours  melatonin 3 milliGRAM(s) Oral at bedtime  memantine 5 milliGRAM(s) Oral two times a day  metoprolol tartrate 25 milliGRAM(s) Oral two times a day  mometasone 110 MICROgram(s) Inhaler 2 Puff(s) Inhalation every 12 hours  pantoprazole  Injectable 40 milliGRAM(s) IV Push every 12 hours  ranolazine 500 milliGRAM(s) Oral two times a day  sodium chloride 0.9%. 1000 milliLiter(s) (75 mL/Hr) IV Continuous <Continuous>  sucralfate 1 Gram(s) Oral every 12 hours  ticagrelor 60 milliGRAM(s) Oral every 12 hours    MEDICATIONS  (PRN):  acetaminophen     Tablet .. 650 milliGRAM(s) Oral every 6 hours PRN Temp greater or equal to 38C (100.4F), Mild Pain (1 - 3), Moderate Pain (4 - 6)  albuterol/ipratropium for Nebulization 3 milliLiter(s) Nebulizer every 6 hours PRN Shortness of Breath and/or Wheezing  guaiFENesin Oral Liquid (Sugar-Free) 100 milliGRAM(s) Oral every 6 hours PRN Cough    LABS:                        9.6    7.10  )-----------( 298      ( 16 Jan 2024 06:28 )             30.6     01-16    139  |  98  |  16  ----------------------------<  147<H>  3.3<L>   |  31  |  1.58<H>    Ca    8.4      16 Jan 2024 06:28  Phos  2.2     01-16  Mg     1.70     01-16        Urinalysis Basic - ( 16 Jan 2024 06:28 )    Color: x / Appearance: x / SG: x / pH: x  Gluc: 147 mg/dL / Ketone: x  / Bili: x / Urobili: x   Blood: x / Protein: x / Nitrite: x   Leuk Esterase: x / RBC: x / WBC x   Sq Epi: x / Non Sq Epi: x / Bacteria: x      CAPILLARY BLOOD GLUCOSE      POCT Blood Glucose.: 116 mg/dL (16 Jan 2024 18:27)  POCT Blood Glucose.: 176 mg/dL (16 Jan 2024 12:26)  POCT Blood Glucose.: 178 mg/dL (16 Jan 2024 08:52)  POCT Blood Glucose.: 184 mg/dL (15 Dmitri 2024 21:32)        Urinalysis Basic - ( 16 Jan 2024 06:28 )    Color: x / Appearance: x / SG: x / pH: x  Gluc: 147 mg/dL / Ketone: x  / Bili: x / Urobili: x   Blood: x / Protein: x / Nitrite: x   Leuk Esterase: x / RBC: x / WBC x   Sq Epi: x / Non Sq Epi: x / Bacteria: x        Consultant(s) Notes Reviewed:  [x ] YES  [ ] NO    PHYSICAL EXAM:  GENERAL: Not in any distress ,NC Oxygen .  HEAD:  Atraumatic, Normocephalic  NECK: Supple, No JVD, Normal thyroid  NERVOUS SYSTEM:  Alert & , No focal deficit   CHEST/LUNG: Good air entry bilateral except bases   HEART: Regular rate and rhythm; No murmurs, rubs, or gallops  ABDOMEN: Soft, Nontender, Nondistended; Bowel sounds present  EXTREMITIES:  + edema      Care Discussed with Consultants/Other Providers [ x] YES  [ ] NO

## 2024-01-16 NOTE — PROGRESS NOTE ADULT - SUBJECTIVE AND OBJECTIVE BOX
Aashish Boyer MD  Interventional Cardiology / Endovascular Specialist  [place] Office : [address] [place] [address] [place] [address]  Tel:   [place] Office : [address] [facility] [place] [address]  Tel: [phone]    Pt lying in bed , more lethargic today     	  MEDICATIONS:  amLODIPine   Tablet 5 milliGRAM(s) Oral daily  aspirin  chewable 81 milliGRAM(s) Oral daily  heparin   Injectable 5000 Unit(s) SubCutaneous every 8 hours  metoprolol tartrate 25 milliGRAM(s) Oral two times a day  ranolazine 500 milliGRAM(s) Oral two times a day  ticagrelor 60 milliGRAM(s) Oral every 12 hours      albuterol/ipratropium for Nebulization 3 milliLiter(s) Nebulizer every 12 hours  albuterol/ipratropium for Nebulization 3 milliLiter(s) Nebulizer every 6 hours PRN  guaiFENesin Oral Liquid (Sugar-Free) 100 milliGRAM(s) Oral every 6 hours PRN  mometasone 110 MICROgram(s) Inhaler 2 Puff(s) Inhalation every 12 hours    acetaminophen     Tablet .. 650 milliGRAM(s) Oral every 6 hours PRN  escitalopram 10 milliGRAM(s) Oral daily  gabapentin Solution 100 milliGRAM(s) Oral two times a day  levETIRAcetam   Injectable 250 milliGRAM(s) IV Push every 12 hours  melatonin 3 milliGRAM(s) Oral at bedtime  memantine 5 milliGRAM(s) Oral two times a day    pantoprazole  Injectable 40 milliGRAM(s) IV Push every 12 hours  sucralfate 1 Gram(s) Oral every 12 hours    insulin glargine Injectable (LANTUS) 14 Unit(s) SubCutaneous at bedtime  insulin lispro (ADMELOG) corrective regimen sliding scale   SubCutaneous at bedtime  insulin lispro (ADMELOG) corrective regimen sliding scale   SubCutaneous three times a day before meals    sodium chloride 0.9%. 1000 milliLiter(s) IV Continuous <Continuous>      PAST MEDICAL/SURGICAL HISTORY  PAST MEDICAL & SURGICAL HISTORY:  Hyperlipemia      Hypertension      Coronary Artery Disease      Diabetes Mellitus Type II      Stented Coronary Artery  total 5 stents, last stent 5/2019      Neuropathy      Myocardial infarction      Stroke  mild left facial numbness   no other residuals verbalized      Myoclonic jerking      Stage 3 chronic kidney disease      History of Cataract Extraction      Hx of CABG      H/O coronary angiogram      S/P coronary artery stent placement  1/6/09      S/P placement of cardiac pacemaker          SOCIAL HISTORY: Substance Use (street drugs): ( x ) never used  (  ) other:    FAMILY HISTORY:  No pertinent family history in first degree relatives        PHYSICAL EXAM:  T(C): 36.7 (01-16-24 @ 13:05), Max: 37.3 (01-15-24 @ 21:27)  HR: 85 (01-16-24 @ 13:05) (79 - 90)  BP: 134/54 (01-16-24 @ 13:05) (126/52 - 147/54)  RR: 17 (01-16-24 @ 13:05) (17 - 18)  SpO2: 98% (01-16-24 @ 13:05) (94% - 100%)  Wt(kg): --  I&O's Summary    15 Dmitri 2024 07:01  -  16 Jan 2024 07:00  --------------------------------------------------------  IN: 120 mL / OUT: 250 mL / NET: -130 mL    EYES:   PERRLA   ENMT:   Moist mucous membranes, Good dentition, No lesions  Cardiovascular: Normal S1 S2, No JVD, No murmurs, No edema  Respiratory: b/l rhonchi   Gastrointestinal:  Soft, Non-tender, + BS	  Extremities: no edema                              9.6    7.10  )-----------( 298      ( 16 Jan 2024 06:28 )             30.6     01-16    139  |  98  |  16  ----------------------------<  147<H>  3.3<L>   |  31  |  1.58<H>    Ca    8.4      16 Jan 2024 06:28  Phos  2.2     01-16  Mg     1.70     01-16      proBNP:   Lipid Profile:   HgA1c:   TSH:     Consultant(s) Notes Reviewed:  [x ] YES  [ ] NO    Care Discussed with Consultants/Other Providers [ x] YES  [ ] NO    Imaging Personally Reviewed independently:  [x] YES  [ ] NO    All labs, radiologic studies, vitals, orders and medications list reviewed. Patient is seen and examined at bedside. Case discussed with medical team.                 Aashish Boyer MD  Interventional Cardiology / Endovascular Specialist  [place] Office : [address] [place] [address] [place] [address]  Tel:   [place] Office : [address] [facility] [place] [address]  Tel: [phone]    Pt lying in bed , more lethargic today     	  MEDICATIONS:  amLODIPine   Tablet 5 milliGRAM(s) Oral daily  aspirin  chewable 81 milliGRAM(s) Oral daily  heparin   Injectable 5000 Unit(s) SubCutaneous every 8 hours  metoprolol tartrate 25 milliGRAM(s) Oral two times a day  ranolazine 500 milliGRAM(s) Oral two times a day  ticagrelor 60 milliGRAM(s) Oral every 12 hours      albuterol/ipratropium for Nebulization 3 milliLiter(s) Nebulizer every 12 hours  albuterol/ipratropium for Nebulization 3 milliLiter(s) Nebulizer every 6 hours PRN  guaiFENesin Oral Liquid (Sugar-Free) 100 milliGRAM(s) Oral every 6 hours PRN  mometasone 110 MICROgram(s) Inhaler 2 Puff(s) Inhalation every 12 hours    acetaminophen     Tablet .. 650 milliGRAM(s) Oral every 6 hours PRN  escitalopram 10 milliGRAM(s) Oral daily  gabapentin Solution 100 milliGRAM(s) Oral two times a day  levETIRAcetam   Injectable 250 milliGRAM(s) IV Push every 12 hours  melatonin 3 milliGRAM(s) Oral at bedtime  memantine 5 milliGRAM(s) Oral two times a day    pantoprazole  Injectable 40 milliGRAM(s) IV Push every 12 hours  sucralfate 1 Gram(s) Oral every 12 hours    insulin glargine Injectable (LANTUS) 14 Unit(s) SubCutaneous at bedtime  insulin lispro (ADMELOG) corrective regimen sliding scale   SubCutaneous at bedtime  insulin lispro (ADMELOG) corrective regimen sliding scale   SubCutaneous three times a day before meals    sodium chloride 0.9%. 1000 milliLiter(s) IV Continuous <Continuous>      PAST MEDICAL/SURGICAL HISTORY  PAST MEDICAL & SURGICAL HISTORY:  Hyperlipemia      Hypertension      Coronary Artery Disease      Diabetes Mellitus Type II      Stented Coronary Artery  total 5 stents, last stent 5/2019      Neuropathy      Myocardial infarction      Stroke  mild left facial numbness   no other residuals verbalized      Myoclonic jerking      Stage 3 chronic kidney disease      History of Cataract Extraction      Hx of CABG      H/O coronary angiogram      S/P coronary artery stent placement  1/6/09      S/P placement of cardiac pacemaker          SOCIAL HISTORY: Substance Use (street drugs): ( x ) never used  (  ) other:    FAMILY HISTORY:  No pertinent family history in first degree relatives        PHYSICAL EXAM:  T(C): 36.7 (01-16-24 @ 13:05), Max: 37.3 (01-15-24 @ 21:27)  HR: 85 (01-16-24 @ 13:05) (79 - 90)  BP: 134/54 (01-16-24 @ 13:05) (126/52 - 147/54)  RR: 17 (01-16-24 @ 13:05) (17 - 18)  SpO2: 98% (01-16-24 @ 13:05) (94% - 100%)  Wt(kg): --  I&O's Summary    15 Dmitri 2024 07:01  -  16 Jan 2024 07:00  --------------------------------------------------------  IN: 120 mL / OUT: 250 mL / NET: -130 mL    EYES:   PERRLA   ENMT:   Moist mucous membranes, Good dentition, No lesions  Cardiovascular: Normal S1 S2, No JVD, No murmurs, No edema  Respiratory: b/l rhonchi   Gastrointestinal:  Soft, Non-tender, + BS	  Extremities: no edema                              9.6    7.10  )-----------( 298      ( 16 Jan 2024 06:28 )             30.6     01-16    139  |  98  |  16  ----------------------------<  147<H>  3.3<L>   |  31  |  1.58<H>    Ca    8.4      16 Jan 2024 06:28  Phos  2.2     01-16  Mg     1.70     01-16      proBNP:   Lipid Profile:   HgA1c:   TSH:     Consultant(s) Notes Reviewed:  [x ] YES  [ ] NO    Care Discussed with Consultants/Other Providers [ x] YES  [ ] NO    Imaging Personally Reviewed independently:  [x] YES  [ ] NO    All labs, radiologic studies, vitals, orders and medications list reviewed. Patient is seen and examined at bedside. Case discussed with medical team.

## 2024-01-16 NOTE — PROGRESS NOTE ADULT - ASSESSMENT
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 CTA demonstrating mesenteric fat stranding associated with ascending/transverse colon. S/p SMA angiography with stent placement with diagnostic laparoscopy 12/24, small bowel and visible colon viable, some inflammation of omentum in RUQ. Course c/b GI bleed, s/p scope on 1/2 with GI with clips placed. Patient transferred overnight from SICU to the floor in clinically stable condition.       Problem/Recommendation - 1:  ·  Problem: Type 2 diabetes mellitus with hyperglycemia.   ·  Recommendation: sugars better now.   Continue Lantus and SSI.     Problem/Recommendation - 2:  ·  Problem: Acute renal failure superimposed on chronic kidney disease.   ·  Recommendation: Creatinine stable.      Problem/Recommendation - 3:  ·  Problem: CAD (coronary artery disease).   ·  Recommendation: S/P CABG and Stents. On DAPT and BB.  cardiology help appreciated.     Problem/Recommendation - 4:  ·  Problem: Essential hypertension.   ·  Recommendation: BP meds with hold parameters.     Problem/Recommendation - 5:  ·  Problem: GI bleed.   ·  Recommendation: On PPI.  S/P EGD.   Impression:          - NG tube removed before endoscopy                       - LA Grade B esophagitis.                       - Bleeding Dieulafoy's lesion of the posterior wall of                        the gastric body. This is likely the source of                        clinically significant bleeding. Hemostasis achieved                        using 2 MR conditional hemoclips.                       - Bleeding edematous mucosa and nonbleeding clean based                        gastric ulcers in the posterior wall of the gastric                        body. These are likely due to NG tube irritation.                        Hemostasis achieved using 1 MR conditional hemoclip.                       - Non-bleeding small clean based duodenal ulcer                       - No specimens collected.    < end of copied text >.     Problem/Recommendation - 6:  ·  Problem: Myoclonic jerking.   ·  Recommendation: On keppra.     Problem/Recommendation - 7:  ·  Problem: Dysphagia.   ·  Recommendation:  S/P  cineesophagogram .  Puree diet     Problem/Recommendation - 8:  ·  Problem: Abdominal distension.   ·  Recommendation: S/P  SMA angiography with stent placement with diagnostic laparoscopy 12/24,  Vascular following.     Problem/Recommendation - 9:  ·  Problem: Anemia due to acute blood loss.   ·  Recommendation: S/P PRBC.   HH stable.       Problem/Recommendation - 10:  ·  Problem: Toxic metabolic encephalopathy.   ·  Recommendation: Mental status waxes and wanes .  D/W Neurology attending and will see patient .     < from: CT Head No Cont (01.16.24 @ 18:01) >    IMPRESSION:    No evidence of acute intracranial hemorrhage, midline shift or CT   evidence of acute territorial infarct.  Partial opacificationof the left middle ear and mastoid air cells.   Correlate clinically for otomastoiditis.  If the patient's symptoms persist, consider short interval follow-up head   CT or brain MRI if there are no MRI contraindications.    Will get ENT consult in AM.    Problem/Recommendation - 11:  ·  Problem: 2019 novel coronavirus disease (COVID-19).   ·  Recommendation: S/P Renmdisvir.     Problem/Recommendation - 12:  ·  Problem: Leg pain  with edema    ·  Recommendation:  Diuretics PRN . ,  Doppler negative for  DVT.  On Gabapentin  < from: TTE W or WO Ultrasound Enhancing Agent (12.25.23 @ 11:09) >  CONCLUSIONS:      1. Technically difficult image quality.   2. Left ventricular cavity is normal. Left ventricular wallthickness is normal. Left ventricular systolic function is normal with an ejection fraction of 62 % by Florence's method of disks. There are no regional wall motion abnormalities seen.   3. Normal right ventricular cavity size and probably normal systolic function.   4. Structurally normal mitral valve with normal leaflet excursion. There is calcification of the mitral valve annulus. There is mild mitral regurgitation.   5. Aortic valve leaflet morphology not well visualized. with reduced systolicexcursion. There is calcification of the aortic valve leaflets. The peak transaortic velocity is 2.47 m/s, peak transaortic gradient is 24.4 mmHg and mean transaortic gradient is 14.0 mmHg with an LVOT/aortic valve VTI ratio of 0.20. Probable mild tomoderate aortic stenosis. There is mild aortic regurgitation.   6. No prior echocardiogram is available for comparison.    < end of copied text >     Problem/Recommendation - 13:  ·  Problem: Persistent Cough ? Aspiration Pneumonia with Pleural Effusion .   ·  Recommendation:   Cough suppressants .  Pulmonary helping   May need Bronchoscopy.    < from: CT Chest No Cont (01.16.24 @ 18:04) >  IMPRESSION:  Occluded right lower lobe bronchus with right lower lobar collapse.  Partially occluded right middle lobe bronchus with near complete right   middle lobar collapse.  Bilateral upper and left lower lobe nodular and groundglass opacities,   predominantly in the right upper lobe.  Moderate-sized bilateral pleural effusions.    Follow-up official report in the A.M.    < end of copied text >    D/W patients sons in room . D/W ACP .  Dispo : DC planning to [facility] pending  above.  .

## 2024-01-16 NOTE — PROGRESS NOTE ADULT - ASSESSMENT
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     EKG SR RBBB (old per family)     1) CAD s/p CABG  -p/w chest pain. EKG non ischemic , trop -sera   - echo with normal lv and moderate AS   - c/w metoprolol   - chest pains  Trop mildly elevated and trending down likley sec to kidney disease,    -1/6 c/o of SOB  ?aspiration NGT in place. CXR Moderate bilateral pleural effusions f/u pulm recs also with anasarca restart IV lasix 40 daily today consider CT chest non con     2) Covid +  - s/p remdesivir   - c/w supportive management   - t/t per primary team     3) Abd distension   -CTA AP obtained which showed  partially occlusive calcified and non-calcified plaque just distal to take-off of the SMA, with estimated at least 75% luminal narrowing. Limited evaluation of its distal branches. Patient taken emergently to OR on 12/24 s/p diagnostic laparoscopy and SMA stent placement with brachial cutdown  -Surgery/vascular on board , f/u recs  - HIDA scan + for acute asa, medical management as poor surgical candidate cont zosyn  - asa and Brilliant restarted      4) UGIB  -GI on board s/p  EGD 1/2/24 which revealed bleeding vessel (likely Dieulafoy) s/p clipping and NGT trauma s/p clipping, fundus not fully visualized 2/2 clot, minute Tushar III lesion in duodenum.   -on Protonix IV q 12

## 2024-01-16 NOTE — PROGRESS NOTE ADULT - SUBJECTIVE AND OBJECTIVE BOX
Date of Service: 01-16-24 @ 12:40    Patient is a 89y old  Male who presents with a chief complaint of COVID19, Chest pain (16 Jan 2024 09:05)      Any change in ROS: seems to be doing  ok : no phlegm      MEDICATIONS  (STANDING):  albuterol/ipratropium for Nebulization 3 milliLiter(s) Nebulizer every 12 hours  amLODIPine   Tablet 5 milliGRAM(s) Oral daily  aspirin  chewable 81 milliGRAM(s) Oral daily  escitalopram 10 milliGRAM(s) Oral daily  gabapentin Solution 100 milliGRAM(s) Oral two times a day  heparin   Injectable 5000 Unit(s) SubCutaneous every 8 hours  insulin glargine Injectable (LANTUS) 14 Unit(s) SubCutaneous at bedtime  insulin lispro (ADMELOG) corrective regimen sliding scale   SubCutaneous at bedtime  insulin lispro (ADMELOG) corrective regimen sliding scale   SubCutaneous three times a day before meals  levETIRAcetam   Injectable 250 milliGRAM(s) IV Push every 12 hours  melatonin 3 milliGRAM(s) Oral at bedtime  memantine 5 milliGRAM(s) Oral two times a day  metoprolol tartrate 25 milliGRAM(s) Oral two times a day  mometasone 110 MICROgram(s) Inhaler 2 Puff(s) Inhalation every 12 hours  pantoprazole  Injectable 40 milliGRAM(s) IV Push every 12 hours  ranolazine 500 milliGRAM(s) Oral two times a day  sucralfate 1 Gram(s) Oral every 12 hours  ticagrelor 60 milliGRAM(s) Oral every 12 hours    MEDICATIONS  (PRN):  acetaminophen     Tablet .. 650 milliGRAM(s) Oral every 6 hours PRN Temp greater or equal to 38C (100.4F), Mild Pain (1 - 3), Moderate Pain (4 - 6)  albuterol/ipratropium for Nebulization 3 milliLiter(s) Nebulizer every 6 hours PRN Shortness of Breath and/or Wheezing  guaiFENesin Oral Liquid (Sugar-Free) 100 milliGRAM(s) Oral every 6 hours PRN Cough    Vital Signs Last 24 Hrs  T(C): 36.8 (16 Jan 2024 05:21), Max: 37.3 (15 Dmitri 2024 21:27)  T(F): 98.3 (16 Jan 2024 05:21), Max: 99.1 (15 Dmitri 2024 21:27)  HR: 84 (16 Jan 2024 10:14) (79 - 90)  BP: 126/52 (16 Jan 2024 05:21) (126/52 - 147/54)  BP(mean): --  RR: 18 (16 Jan 2024 05:21) (18 - 18)  SpO2: 98% (16 Jan 2024 10:14) (94% - 100%)    Parameters below as of 16 Jan 2024 10:14  Patient On (Oxygen Delivery Method): nasal cannula, 3 l/m        I&O's Summary    15 Dmitri 2024 07:01  -  16 Jan 2024 07:00  --------------------------------------------------------  IN: 120 mL / OUT: 250 mL / NET: -130 mL          Physical Exam:   GENERAL: NAD, well-groomed, well-developed  HEENT: JAVAD/   Atraumatic, Normocephalic  ENMT: No tonsillar erythema, exudates, or enlargement; Moist mucous membranes, Good dentition, No lesions  NECK: Supple, No JVD, Normal thyroid  CHEST/LUNG: Clear to auscultaion-  CVS: Regular rate and rhythm; No murmurs, rubs, or gallops  GI: : Soft, Nontender, Nondistended; Bowel sounds present  NERVOUS SYSTEM:  Alert & awake  EXTREMITIES:  2+ Peripheral Pulses, No clubbing, cyanosis, or edema  LYMPH: No lymphadenopathy noted  SKIN: No rashes or lesions  ENDOCRINOLOGY: No Thyromegaly  PSYCH: calm     Labs:  37                            9.6    7.10  )-----------( 298      ( 16 Jan 2024 06:28 )             30.6     01-16    139  |  98  |  16  ----------------------------<  147<H>  3.3<L>   |  31  |  1.58<H>  01-13    138  |  99  |  16  ----------------------------<  103<H>  3.5   |  30  |  1.58<H>    Ca    8.4      16 Jan 2024 06:28  Phos  2.2     01-16  Mg     1.70     01-16      CAPILLARY BLOOD GLUCOSE      POCT Blood Glucose.: 176 mg/dL (16 Jan 2024 12:26)  POCT Blood Glucose.: 178 mg/dL (16 Jan 2024 08:52)  POCT Blood Glucose.: 184 mg/dL (15 Dmitri 2024 21:32)  POCT Blood Glucose.: 218 mg/dL (15 Dmitri 2024 18:00)          Urinalysis Basic - ( 16 Jan 2024 06:28 )    Color: x / Appearance: x / SG: x / pH: x  Gluc: 147 mg/dL / Ketone: x  / Bili: x / Urobili: x   Blood: x / Protein: x / Nitrite: x   Leuk Esterase: x / RBC: x / WBC x   Sq Epi: x / Non Sq Epi: x / Bacteria: x            RECENT CULTURES:        RESPIRATORY CULTURES:          Studies  Chest X-RAY  CT SCAN Chest   Venous Dopplers: LE:   CT Abdomen  Others    rad< from: Xray Chest 1 View- PORTABLE-Urgent (Xray Chest 1 View- PORTABLE-Urgent .) (01.09.24 @ 14:53) >    ACC: [id number] EXAM:  XR CHEST PORTABLE URGENT 1V   ORDERED BY: EDWARD MARINO     PROCEDURE DATE:  01/09/2024          INTERPRETATION:  CLINICAL INDICATION: Abnormal Chest Sounds    TECHNIQUE: Single frontal, portable view of the chest was obtained.    COMPARISON: Chest x-ray 1/6/2024.    FINDINGS:  Left chest wall pacemaker. Cardiac stent. Enteric tube with tip in the   distal stomach/proximal duodenum. Sternotomy wires.  The heart size is normal.  Slight increase in size of moderate bilateral pleural effusions.  No pneumothorax.    IMPRESSION:  Slight increase in size of moderate bilateral pleural effusions.    --- End of Report ---          FABRICE DUMONT MD; Resident Radiologist  This document has been electronically signed.  AMELIA ANGLIN MD; Attending Radiologist  This document has been electronically signed. Jan 9 2024  3:18PM    < end of copied text >             Date of Service: 01-16-24 @ 12:40    Patient is a 89y old  Male who presents with a chief complaint of COVID19, Chest pain (16 Jan 2024 09:05)      Any change in ROS: seems to be doing  ok : no phlegm      MEDICATIONS  (STANDING):  albuterol/ipratropium for Nebulization 3 milliLiter(s) Nebulizer every 12 hours  amLODIPine   Tablet 5 milliGRAM(s) Oral daily  aspirin  chewable 81 milliGRAM(s) Oral daily  escitalopram 10 milliGRAM(s) Oral daily  gabapentin Solution 100 milliGRAM(s) Oral two times a day  heparin   Injectable 5000 Unit(s) SubCutaneous every 8 hours  insulin glargine Injectable (LANTUS) 14 Unit(s) SubCutaneous at bedtime  insulin lispro (ADMELOG) corrective regimen sliding scale   SubCutaneous at bedtime  insulin lispro (ADMELOG) corrective regimen sliding scale   SubCutaneous three times a day before meals  levETIRAcetam   Injectable 250 milliGRAM(s) IV Push every 12 hours  melatonin 3 milliGRAM(s) Oral at bedtime  memantine 5 milliGRAM(s) Oral two times a day  metoprolol tartrate 25 milliGRAM(s) Oral two times a day  mometasone 110 MICROgram(s) Inhaler 2 Puff(s) Inhalation every 12 hours  pantoprazole  Injectable 40 milliGRAM(s) IV Push every 12 hours  ranolazine 500 milliGRAM(s) Oral two times a day  sucralfate 1 Gram(s) Oral every 12 hours  ticagrelor 60 milliGRAM(s) Oral every 12 hours    MEDICATIONS  (PRN):  acetaminophen     Tablet .. 650 milliGRAM(s) Oral every 6 hours PRN Temp greater or equal to 38C (100.4F), Mild Pain (1 - 3), Moderate Pain (4 - 6)  albuterol/ipratropium for Nebulization 3 milliLiter(s) Nebulizer every 6 hours PRN Shortness of Breath and/or Wheezing  guaiFENesin Oral Liquid (Sugar-Free) 100 milliGRAM(s) Oral every 6 hours PRN Cough    Vital Signs Last 24 Hrs  T(C): 36.8 (16 Jan 2024 05:21), Max: 37.3 (15 Dmitri 2024 21:27)  T(F): 98.3 (16 Jan 2024 05:21), Max: 99.1 (15 Dmitri 2024 21:27)  HR: 84 (16 Jan 2024 10:14) (79 - 90)  BP: 126/52 (16 Jan 2024 05:21) (126/52 - 147/54)  BP(mean): --  RR: 18 (16 Jan 2024 05:21) (18 - 18)  SpO2: 98% (16 Jan 2024 10:14) (94% - 100%)    Parameters below as of 16 Jan 2024 10:14  Patient On (Oxygen Delivery Method): nasal cannula, 3 l/m        I&O's Summary    15 Dmitri 2024 07:01  -  16 Jan 2024 07:00  --------------------------------------------------------  IN: 120 mL / OUT: 250 mL / NET: -130 mL          Physical Exam:   GENERAL: NAD, well-groomed, well-developed  HEENT: JAVAD/   Atraumatic, Normocephalic  ENMT: No tonsillar erythema, exudates, or enlargement; Moist mucous membranes, Good dentition, No lesions  NECK: Supple, No JVD, Normal thyroid  CHEST/LUNG: Clear to auscultaion-  CVS: Regular rate and rhythm; No murmurs, rubs, or gallops  GI: : Soft, Nontender, Nondistended; Bowel sounds present  NERVOUS SYSTEM:  Alert & awake  EXTREMITIES:  2+ Peripheral Pulses, No clubbing, cyanosis, or edema  LYMPH: No lymphadenopathy noted  SKIN: No rashes or lesions  ENDOCRINOLOGY: No Thyromegaly  PSYCH: calm     Labs:  37                            9.6    7.10  )-----------( 298      ( 16 Jan 2024 06:28 )             30.6     01-16    139  |  98  |  16  ----------------------------<  147<H>  3.3<L>   |  31  |  1.58<H>  01-13    138  |  99  |  16  ----------------------------<  103<H>  3.5   |  30  |  1.58<H>    Ca    8.4      16 Jan 2024 06:28  Phos  2.2     01-16  Mg     1.70     01-16      CAPILLARY BLOOD GLUCOSE      POCT Blood Glucose.: 176 mg/dL (16 Jan 2024 12:26)  POCT Blood Glucose.: 178 mg/dL (16 Jan 2024 08:52)  POCT Blood Glucose.: 184 mg/dL (15 Dmitri 2024 21:32)  POCT Blood Glucose.: 218 mg/dL (15 Dmitri 2024 18:00)          Urinalysis Basic - ( 16 Jan 2024 06:28 )    Color: x / Appearance: x / SG: x / pH: x  Gluc: 147 mg/dL / Ketone: x  / Bili: x / Urobili: x   Blood: x / Protein: x / Nitrite: x   Leuk Esterase: x / RBC: x / WBC x   Sq Epi: x / Non Sq Epi: x / Bacteria: x            RECENT CULTURES:        RESPIRATORY CULTURES:          Studies  Chest X-RAY  CT SCAN Chest   Venous Dopplers: LE:   CT Abdomen  Others    rad< from: Xray Chest 1 View- PORTABLE-Urgent (Xray Chest 1 View- PORTABLE-Urgent .) (01.09.24 @ 14:53) >    ACC: [id number] EXAM:  XR CHEST PORTABLE URGENT 1V   ORDERED BY: EDWARD MARINO     PROCEDURE DATE:  01/09/2024          INTERPRETATION:  CLINICAL INDICATION: Abnormal Chest Sounds    TECHNIQUE: Single frontal, portable view of the chest was obtained.    COMPARISON: Chest x-ray 1/6/2024.    FINDINGS:  Left chest wall pacemaker. Cardiac stent. Enteric tube with tip in the   distal stomach/proximal duodenum. Sternotomy wires.  The heart size is normal.  Slight increase in size of moderate bilateral pleural effusions.  No pneumothorax.    IMPRESSION:  Slight increase in size of moderate bilateral pleural effusions.    --- End of Report ---          FABRICE DUMONT MD; Resident Radiologist  This document has been electronically signed.  AMELIA ANGLIN MD; Attending Radiologist  This document has been electronically signed. Jan 9 2024  3:18PM    < end of copied text >

## 2024-01-17 LAB
ANION GAP SERPL CALC-SCNC: 9 MMOL/L — SIGNIFICANT CHANGE UP (ref 7–14)
BUN SERPL-MCNC: 16 MG/DL — SIGNIFICANT CHANGE UP (ref 7–23)
CALCIUM SERPL-MCNC: 8.3 MG/DL — LOW (ref 8.4–10.5)
CHLORIDE SERPL-SCNC: 101 MMOL/L — SIGNIFICANT CHANGE UP (ref 98–107)
CO2 SERPL-SCNC: 31 MMOL/L — SIGNIFICANT CHANGE UP (ref 22–31)
CREAT SERPL-MCNC: 1.57 MG/DL — HIGH (ref 0.5–1.3)
EGFR: 42 ML/MIN/1.73M2 — LOW
GLUCOSE BLDC GLUCOMTR-MCNC: 146 MG/DL — HIGH (ref 70–99)
GLUCOSE BLDC GLUCOMTR-MCNC: 179 MG/DL — HIGH (ref 70–99)
GLUCOSE BLDC GLUCOMTR-MCNC: 191 MG/DL — HIGH (ref 70–99)
GLUCOSE BLDC GLUCOMTR-MCNC: 90 MG/DL — SIGNIFICANT CHANGE UP (ref 70–99)
GLUCOSE SERPL-MCNC: 103 MG/DL — HIGH (ref 70–99)
HCT VFR BLD CALC: 28.5 % — LOW (ref 39–50)
HGB BLD-MCNC: 9 G/DL — LOW (ref 13–17)
MAGNESIUM SERPL-MCNC: 1.9 MG/DL — SIGNIFICANT CHANGE UP (ref 1.6–2.6)
MCHC RBC-ENTMCNC: 30.1 PG — SIGNIFICANT CHANGE UP (ref 27–34)
MCHC RBC-ENTMCNC: 31.6 GM/DL — LOW (ref 32–36)
MCV RBC AUTO: 95.3 FL — SIGNIFICANT CHANGE UP (ref 80–100)
NRBC # BLD: 0 /100 WBCS — SIGNIFICANT CHANGE UP (ref 0–0)
NRBC # FLD: 0 K/UL — SIGNIFICANT CHANGE UP (ref 0–0)
PHOSPHATE SERPL-MCNC: 2.2 MG/DL — LOW (ref 2.5–4.5)
PLATELET # BLD AUTO: 254 K/UL — SIGNIFICANT CHANGE UP (ref 150–400)
POTASSIUM SERPL-MCNC: 3.3 MMOL/L — LOW (ref 3.5–5.3)
POTASSIUM SERPL-SCNC: 3.3 MMOL/L — LOW (ref 3.5–5.3)
RBC # BLD: 2.99 M/UL — LOW (ref 4.2–5.8)
RBC # FLD: 18.2 % — HIGH (ref 10.3–14.5)
SODIUM SERPL-SCNC: 141 MMOL/L — SIGNIFICANT CHANGE UP (ref 135–145)
WBC # BLD: 7.43 K/UL — SIGNIFICANT CHANGE UP (ref 3.8–10.5)
WBC # FLD AUTO: 7.43 K/UL — SIGNIFICANT CHANGE UP (ref 3.8–10.5)

## 2024-01-17 RX ORDER — FUROSEMIDE 40 MG
20 TABLET ORAL DAILY
Refills: 0 | Status: DISCONTINUED | OUTPATIENT
Start: 2024-01-17 | End: 2024-01-19

## 2024-01-17 RX ORDER — IPRATROPIUM/ALBUTEROL SULFATE 18-103MCG
3 AEROSOL WITH ADAPTER (GRAM) INHALATION EVERY 6 HOURS
Refills: 0 | Status: DISCONTINUED | OUTPATIENT
Start: 2024-01-17 | End: 2024-02-26

## 2024-01-17 RX ORDER — POTASSIUM CHLORIDE 20 MEQ
40 PACKET (EA) ORAL ONCE
Refills: 0 | Status: COMPLETED | OUTPATIENT
Start: 2024-01-17 | End: 2024-01-17

## 2024-01-17 RX ORDER — SODIUM CHLORIDE 9 MG/ML
4 INJECTION INTRAMUSCULAR; INTRAVENOUS; SUBCUTANEOUS EVERY 6 HOURS
Refills: 0 | Status: DISCONTINUED | OUTPATIENT
Start: 2024-01-17 | End: 2024-02-05

## 2024-01-17 RX ORDER — SODIUM,POTASSIUM PHOSPHATES 278-250MG
1 POWDER IN PACKET (EA) ORAL ONCE
Refills: 0 | Status: COMPLETED | OUTPATIENT
Start: 2024-01-17 | End: 2024-01-17

## 2024-01-17 RX ADMIN — AMLODIPINE BESYLATE 5 MILLIGRAM(S): 2.5 TABLET ORAL at 05:46

## 2024-01-17 RX ADMIN — SODIUM CHLORIDE 4 MILLILITER(S): 9 INJECTION INTRAMUSCULAR; INTRAVENOUS; SUBCUTANEOUS at 21:58

## 2024-01-17 RX ADMIN — HEPARIN SODIUM 5000 UNIT(S): 5000 INJECTION INTRAVENOUS; SUBCUTANEOUS at 18:21

## 2024-01-17 RX ADMIN — GABAPENTIN 100 MILLIGRAM(S): 400 CAPSULE ORAL at 05:45

## 2024-01-17 RX ADMIN — HEPARIN SODIUM 5000 UNIT(S): 5000 INJECTION INTRAVENOUS; SUBCUTANEOUS at 05:44

## 2024-01-17 RX ADMIN — MOMETASONE FUROATE 2 PUFF(S): 220 INHALANT RESPIRATORY (INHALATION) at 18:20

## 2024-01-17 RX ADMIN — Medication 2: at 18:21

## 2024-01-17 RX ADMIN — INSULIN GLARGINE 14 UNIT(S): 100 INJECTION, SOLUTION SUBCUTANEOUS at 21:30

## 2024-01-17 RX ADMIN — Medication 25 MILLIGRAM(S): at 18:22

## 2024-01-17 RX ADMIN — TICAGRELOR 60 MILLIGRAM(S): 90 TABLET ORAL at 05:46

## 2024-01-17 RX ADMIN — Medication 1 GRAM(S): at 05:46

## 2024-01-17 RX ADMIN — Medication 100 MILLIGRAM(S): at 18:23

## 2024-01-17 RX ADMIN — RANOLAZINE 500 MILLIGRAM(S): 500 TABLET, FILM COATED, EXTENDED RELEASE ORAL at 18:23

## 2024-01-17 RX ADMIN — Medication 3 MILLIGRAM(S): at 21:30

## 2024-01-17 RX ADMIN — GABAPENTIN 100 MILLIGRAM(S): 400 CAPSULE ORAL at 18:22

## 2024-01-17 RX ADMIN — Medication 100 MILLIGRAM(S): at 12:26

## 2024-01-17 RX ADMIN — Medication 1 PACKET(S): at 12:25

## 2024-01-17 RX ADMIN — MOMETASONE FUROATE 2 PUFF(S): 220 INHALANT RESPIRATORY (INHALATION) at 21:30

## 2024-01-17 RX ADMIN — Medication 81 MILLIGRAM(S): at 12:26

## 2024-01-17 RX ADMIN — Medication 3 MILLILITER(S): at 09:20

## 2024-01-17 RX ADMIN — PANTOPRAZOLE SODIUM 40 MILLIGRAM(S): 20 TABLET, DELAYED RELEASE ORAL at 18:21

## 2024-01-17 RX ADMIN — Medication 3 MILLILITER(S): at 15:12

## 2024-01-17 RX ADMIN — LEVETIRACETAM 250 MILLIGRAM(S): 250 TABLET, FILM COATED ORAL at 18:26

## 2024-01-17 RX ADMIN — MEMANTINE HYDROCHLORIDE 5 MILLIGRAM(S): 10 TABLET ORAL at 05:45

## 2024-01-17 RX ADMIN — SODIUM CHLORIDE 4 MILLILITER(S): 9 INJECTION INTRAMUSCULAR; INTRAVENOUS; SUBCUTANEOUS at 15:12

## 2024-01-17 RX ADMIN — RANOLAZINE 500 MILLIGRAM(S): 500 TABLET, FILM COATED, EXTENDED RELEASE ORAL at 05:48

## 2024-01-17 RX ADMIN — Medication 40 MILLIEQUIVALENT(S): at 12:25

## 2024-01-17 RX ADMIN — MEMANTINE HYDROCHLORIDE 5 MILLIGRAM(S): 10 TABLET ORAL at 18:23

## 2024-01-17 RX ADMIN — SODIUM CHLORIDE 4 MILLILITER(S): 9 INJECTION INTRAMUSCULAR; INTRAVENOUS; SUBCUTANEOUS at 09:29

## 2024-01-17 RX ADMIN — PANTOPRAZOLE SODIUM 40 MILLIGRAM(S): 20 TABLET, DELAYED RELEASE ORAL at 05:45

## 2024-01-17 RX ADMIN — HEPARIN SODIUM 5000 UNIT(S): 5000 INJECTION INTRAVENOUS; SUBCUTANEOUS at 21:30

## 2024-01-17 RX ADMIN — ESCITALOPRAM OXALATE 10 MILLIGRAM(S): 10 TABLET, FILM COATED ORAL at 12:27

## 2024-01-17 RX ADMIN — Medication 25 MILLIGRAM(S): at 05:46

## 2024-01-17 RX ADMIN — Medication 1 GRAM(S): at 18:22

## 2024-01-17 RX ADMIN — Medication 3 MILLILITER(S): at 21:58

## 2024-01-17 RX ADMIN — LEVETIRACETAM 250 MILLIGRAM(S): 250 TABLET, FILM COATED ORAL at 05:46

## 2024-01-17 RX ADMIN — TICAGRELOR 60 MILLIGRAM(S): 90 TABLET ORAL at 18:22

## 2024-01-17 NOTE — PROGRESS NOTE ADULT - ASSESSMENT
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 CTA demonstrating mesenteric fat stranding associated with ascending/transverse colon. S/p SMA angiography with stent placement with diagnostic laparoscopy 12/24, small bowel and visible colon viable, some inflammation of omentum in RUQ. Course c/b GI bleed, s/p scope on 1/2 with GI with clips placed. Patient transferred overnight from SICU to the floor in clinically stable condition.       Problem/Recommendation - 1:  ·  Problem: Type 2 diabetes mellitus with hyperglycemia.   ·  Recommendation: sugars better now.   Continue Lantus and SSI.     Problem/Recommendation - 2:  ·  Problem: Acute renal failure superimposed on chronic kidney disease.   ·  Recommendation: Creatinine stable.      Problem/Recommendation - 3:  ·  Problem: CAD (coronary artery disease).   ·  Recommendation: S/P CABG and Stents. On DAPT and BB.  cardiology help appreciated.     Problem/Recommendation - 4:  ·  Problem: Essential hypertension.   ·  Recommendation: BP meds with hold parameters.     Problem/Recommendation - 5:  ·  Problem: GI bleed.   ·  Recommendation: On PPI.  S/P EGD.   Impression:          - NG tube removed before endoscopy                       - LA Grade B esophagitis.                       - Bleeding Dieulafoy's lesion of the posterior wall of                        the gastric body. This is likely the source of                        clinically significant bleeding. Hemostasis achieved                        using 2 MR conditional hemoclips.                       - Bleeding edematous mucosa and nonbleeding clean based                        gastric ulcers in the posterior wall of the gastric                        body. These are likely due to NG tube irritation.                        Hemostasis achieved using 1 MR conditional hemoclip.                       - Non-bleeding small clean based duodenal ulcer                       - No specimens collected.    < end of copied text >.     Problem/Recommendation - 6:  ·  Problem: Myoclonic jerking.   ·  Recommendation: On keppra.     Problem/Recommendation - 7:  ·  Problem: Dysphagia.   ·  Recommendation:  S/P  cineesophagogram .  Puree diet     Problem/Recommendation - 8:  ·  Problem: Abdominal distension.   ·  Recommendation: S/P  SMA angiography with stent placement with diagnostic laparoscopy 12/24,  Vascular following.     Problem/Recommendation - 9:  ·  Problem: Anemia due to acute blood loss.   ·  Recommendation: S/P PRBC.   HH stable.       Problem/Recommendation - 10:  ·  Problem: Toxic metabolic encephalopathy.   ·  Recommendation: Mental status waxes and wanes .  D/W Neurology attending and will see patient .   MRI pending.   < from: CT Head No Cont (01.16.24 @ 18:01) >    IMPRESSION:    No evidence of acute intracranial hemorrhage, midline shift or CT   evidence of acute territorial infarct.  Partial opacificationof the left middle ear and mastoid air cells.   Correlate clinically for otomastoiditis.  If the patient's symptoms persist, consider short interval follow-up head   CT or brain MRI if there are no MRI contraindications.    Will get ENT consult in AM.    Problem/Recommendation - 11:  ·  Problem: 2019 novel coronavirus disease (COVID-19).   ·  Recommendation: S/P Renmdisvir.     Problem/Recommendation - 12:  ·  Problem: Pleural effusion  with Leg edema    ·  Recommendation:  Diuretics PRN . ,  Doppler negative for  DVT.  On Gabapentin  < from: TTE W or WO Ultrasound Enhancing Agent (12.25.23 @ 11:09) >  CONCLUSIONS:      1. Technically difficult image quality.   2. Left ventricular cavity is normal. Left ventricular wallthickness is normal. Left ventricular systolic function is normal with an ejection fraction of 62 % by Florence's method of disks. There are no regional wall motion abnormalities seen.   3. Normal right ventricular cavity size and probably normal systolic function.   4. Structurally normal mitral valve with normal leaflet excursion. There is calcification of the mitral valve annulus. There is mild mitral regurgitation.   5. Aortic valve leaflet morphology not well visualized. with reduced systolicexcursion. There is calcification of the aortic valve leaflets. The peak transaortic velocity is 2.47 m/s, peak transaortic gradient is 24.4 mmHg and mean transaortic gradient is 14.0 mmHg with an LVOT/aortic valve VTI ratio of 0.20. Probable mild tomoderate aortic stenosis. There is mild aortic regurgitation.   6. No prior echocardiogram is available for comparison.    < end of copied text >     Problem/Recommendation - 13:  ·  Problem: Persistent Cough ? Aspiration Pneumonia with Pleural Effusion .   ·  Recommendation:   Cough suppressants .  Pulmonary helping   May need Bronchoscopy .    < from: CT Chest No Cont (01.16.24 @ 18:04) >  IMPRESSION:  Occluded right lower lobe bronchus with right lower lobar collapse.  Partially occluded right middle lobe bronchus with near complete right   middle lobar collapse.  Bilateral upper and left lower lobe nodular and groundglass opacities,   predominantly in the right upper lobe.  Moderate-sized bilateral pleural effusions.    Follow-up official report in the A.[facility].    < end of copied text >    D/W Grandson in room.   Dispo : DC planning to [facility] pending  above.

## 2024-01-17 NOTE — PROGRESS NOTE ADULT - ASSESSMENT
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 Patient states that he has been having a dry cough for the past week, worsened over the past few days. Denies SOB with the cough, denies hemoptysis. Reports fevers at home to 101.5 with associated diaphoresis. Said that he has significant pinprick like b/l chest pain with his cough but denies any chest pain when not coughing or with ambulation. Also reports rhinorrhea and sore throat. Reports generalized weakness and poor appetite. States his daughter was visiting him over the week end last week and she was positive for COVID19. Patient tested positive for COVID19 2 days prior and was started on paxlovid as outpatient. Of note, family states that the patient has had worsening confusion at home over the past 6 months (becoming disoriented to time) but recently has been more confused, talking about seeing people that are not present.   On arrival to the ED his vitals were T 98 -> 99.2, P 80, /72, RR 18, ) sat 100% RA. His lab work showed leukocytosis with neutrophilia and bandemia, MABLE, mild hyponatremia, indeterminant but stable troponins, and elevated lactate to 2.3. His COVID19 swab was positive. His CXR showed bibasilar crackles.  (23 Dec 2023 22:51):  pt has been here for past two weeks and now pulm called fro excessive secretions   he is able to answer simple questions:  grand sons at bedside:     Covid / Resp failure/on 2 L of oxygen  :  CADS/P CABG  DM  CKD  HTN  CVA X 3    Covid / Resp failure/on 2 L of oxygen:  -he was treated for covid before:   -he has excessive secretions  -keep o2 sao2 above 90%  -add BD and mucomyst: ;  -add mucinex:   1/10: he seems to be doing  ok: cont mucomyst and bd   1/11 ?: add benzonatate and Robitussin prm : dc dornase  1/12: seems OK: no sob:  no cough : no phlegm : has gurgling sound in throat:  looks comfortable  1/14: he is on room air:  with good sao2:  finished covid rx:  cont current rx:  high risk for aspiration as he has gurgling secretions in throat   1/15: would do ct chest he seems to have increasing effusions:  his ct scan from last week of december:  has not much of effusions: however will try lasix : [facility]    1/16: doing  ok : no os:b has secretions still : change to percussion bed:  cont bd  1/17: seems stable:  no sob: on 3 L ofo xygen : should add ATC lasix to me as he has formed new pleural effusions:  echo with mod AS   CADS/P CABG  -cont current medications   DM  monitor and control   CKD  -cont current meds   HTN   -controlled  CVA X 3  -doing ok :     [facility]  and team  GI Bleed:   -secondary to Dieulafoy's lesion:  defer to primary team :    dvt prophylaxis    [facility]

## 2024-01-17 NOTE — CHART NOTE - NSCHARTNOTEFT_GEN_A_CORE
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 CTA demonstrating mesenteric fat stranding associated with ascending/transverse colon. S/p SMA angiography with stent placement with diagnostic laparoscopy 12/24, small bowel and visible colon viable, some inflammation of omentum in RUQ. Course c/b GI bleed, s/p scope on 1/2 with GI with clips placed. Patient transferred overnight from SICU to the floor in clinically stable condition.    Nutrition Course: Patient sitting comfortably in chair when visit. Pt was having breakfast with feeding assistance provided by son. Son provided collateral, who reports ot has been eating poor and fairly in hospital. PO intake noted with 26-50% in hospital. Pt s/p Swallow VFSS/MBS assessment 1/8, SLP recommended pureed with moderately thick liquids. Son is requesting another Swallow Eval, states his dad was "somnolent when tested for Swallow Eval, now he is more awake would like to get another Swallow Eval. Recommend consider SLP consult. Son is amenable for pt to receive Glucerna 2x daily (440kcals, 20g protein). Son reported pt was drinking Glucerna shake at home as well. Son denies any GI distress (nausea/vomiting/diarrhea/constipation.) Last BM 1/14 as per RN flow sheet. Pt's weight trend per RN Flow sheet, 179.6lbs (1/4), 173.9lbs (1/3), 175.2lbs (1/1). Adm weight 1/2 174lbs. Pt's weight trend noted w/ fluctuation, likely due to edema which is masking his weight loss. Pt noted with 1+ generalized edema and 2+ b/l feet edema as per RN Flow sheet. RD to remain available for further nutritional interventions as indicated.          Diet, Consistent Carbohydrate/No Snacks:   Pureed (PUREED)  Moderately Thick Liquids (MODTHICKLIQS)  Halal (01-10-24 @ 18:32)      GI: WDL. Last BM 1/14 per RN Flow sheet     PO intake:  < 50% [ x]     Anthropometrics: Height (cm): 175.3 (01-02)  Weight (kg): 79.3 (01-02)  BMI (kg/m2): 25.8 (01-02)    Edema: 1+ generalized, 3+ b/l feet edema per RN flow sheet   Pressure Injuries: None reported at present.     __________________ Pertinent Medications__________________   MEDICATIONS  (STANDING):  albuterol/ipratropium for Nebulization 3 milliLiter(s) Nebulizer every 6 hours  amLODIPine   Tablet 5 milliGRAM(s) Oral daily  aspirin  chewable 81 milliGRAM(s) Oral daily  escitalopram 10 milliGRAM(s) Oral daily  gabapentin Solution 100 milliGRAM(s) Oral two times a day  heparin   Injectable 5000 Unit(s) SubCutaneous every 8 hours  insulin glargine Injectable (LANTUS) 14 Unit(s) SubCutaneous at bedtime  insulin lispro (ADMELOG) corrective regimen sliding scale   SubCutaneous at bedtime  insulin lispro (ADMELOG) corrective regimen sliding scale   SubCutaneous three times a day before meals  levETIRAcetam   Injectable 250 milliGRAM(s) IV Push every 12 hours  melatonin 3 milliGRAM(s) Oral at bedtime  memantine 5 milliGRAM(s) Oral two times a day  metoprolol tartrate 25 milliGRAM(s) Oral two times a day  mometasone 110 MICROgram(s) Inhaler 2 Puff(s) Inhalation every 12 hours  pantoprazole  Injectable 40 milliGRAM(s) IV Push every 12 hours  ranolazine 500 milliGRAM(s) Oral two times a day  sodium chloride 0.9%. 1000 milliLiter(s) (75 mL/Hr) IV Continuous <Continuous>  sodium chloride 3%  Inhalation 4 milliLiter(s) Inhalation every 6 hours  sucralfate 1 Gram(s) Oral every 12 hours  ticagrelor 60 milliGRAM(s) Oral every 12 hours    MEDICATIONS  (PRN):  acetaminophen     Tablet .. 650 milliGRAM(s) Oral every 6 hours PRN Temp greater or equal to 38C (100.4F), Mild Pain (1 - 3), Moderate Pain (4 - 6)  guaiFENesin Oral Liquid (Sugar-Free) 100 milliGRAM(s) Oral every 6 hours PRN Cough      __________________ Pertinent Labs__________________   01-17 Na141 mmol/L Glu 103 mg/dL<H> K+ 3.3 mmol/L<L> Cr  1.57 mg/dL<H> BUN 16 mg/dL 01-17 Phos 2.2 mg/dL<L> 01-11 Alb 2.6 g/dL<L> 12-24 Chol 66 mg/dL LDL --    HDL 26 mg/dL<L> Trig 102 mg/dL        POCT Blood Glucose.: 146 mg/dL (01-17-24 @ 12:46)  POCT Blood Glucose.: 90 mg/dL (01-17-24 @ 08:46)  POCT Blood Glucose.: 207 mg/dL (01-16-24 @ 21:39)  POCT Blood Glucose.: 116 mg/dL (01-16-24 @ 18:27)  POCT Blood Glucose.: 176 mg/dL (01-16-24 @ 12:26)  POCT Blood Glucose.: 178 mg/dL (01-16-24 @ 08:52)  POCT Blood Glucose.: 184 mg/dL (01-15-24 @ 21:32)  POCT Blood Glucose.: 218 mg/dL (01-15-24 @ 18:00)  POCT Blood Glucose.: 188 mg/dL (01-15-24 @ 12:24)  POCT Blood Glucose.: 157 mg/dL (01-15-24 @ 09:14)  POCT Blood Glucose.: 188 mg/dL (01-14-24 @ 22:12)  POCT Blood Glucose.: 150 mg/dL (01-14-24 @ 21:04)  POCT Blood Glucose.: 228 mg/dL (01-14-24 @ 17:44)          Estimated Needs:   [x ] no change since previous assessment        Previous Nutrition Diagnosis: Moderate protein and calorie malnutrition.     Nutrition Diagnosis is [x ] ongoing       Recommendations:  1) Recommend add Glucerna 2x daily (440kcals, 20g protein) to provide optimal nutrition.   2) Recommend SLP consult for Swallow Eval as per son's request.   3) Monitor PO intake, Labs, weights, BMs, and skin integrity.   4) RD to remain available for further nutritional interventions as indicated.        Monitoring and Evaluation:      [ x] Tolerance to diet prescription [x ] weights [x ] follow up per protocol  [ ] other:

## 2024-01-17 NOTE — PROGRESS NOTE ADULT - ASSESSMENT
on distress   on  puree  diet     ROS ; no sob   acetaminophen   IVPB .. 1000 milliGRAM(s) IV Intermittent every 6 hours PRN  albuterol/ipratropium for Nebulization 3 milliLiter(s) Nebulizer every 12 hours  amLODIPine   Tablet 5 milliGRAM(s) Oral daily  aspirin  chewable 81 milliGRAM(s) Oral daily  dornase cierra Solution 2.5 milliGRAM(s) Inhalation every 12 hours  escitalopram 10 milliGRAM(s) Oral daily  gabapentin Solution 100 milliGRAM(s) Oral two times a day  heparin   Injectable 5000 Unit(s) SubCutaneous every 8 hours  insulin glargine Injectable (LANTUS) 14 Unit(s) SubCutaneous at bedtime  insulin lispro (ADMELOG) corrective regimen sliding scale   SubCutaneous every 6 hours  levETIRAcetam   Injectable 250 milliGRAM(s) IV Push every 12 hours  melatonin 3 milliGRAM(s) Oral at bedtime  memantine 5 milliGRAM(s) Oral two times a day  metoprolol tartrate Injectable 5 milliGRAM(s) IV Push every 6 hours  mometasone 110 MICROgram(s) Inhaler 2 Puff(s) Inhalation every 12 hours  pantoprazole  Injectable 40 milliGRAM(s) IV Push every 12 hours  ranolazine 500 milliGRAM(s) Oral two times a day  sodium chloride 0.45% with potassium chloride 20 mEq/L 1000 milliLiter(s) IV Continuous <Continuous>  sodium phosphate 30 milliMole(s)/500 mL IVPB 30 milliMole(s) IV Intermittent once  sucralfate 1 Gram(s) Oral every 12 hours  ticagrelor 60 milliGRAM(s) Enteral Tube every 12 hours      VITAL:  T(C): , Max: 37 (01-06-24 @ 21:06)  T(F): , Max: 98.6 (01-06-24 @ 21:06)  HR: 86 (01-07-24 @ 05:45)  BP: 180/82 (01-07-24 @ 05:45)  BP(mean): --  RR: 19 (01-07-24 @ 05:45)  SpO2: 95% (01-07-24 @ 05:45)  Wt(kg): --    01-06-24 @ 07:01  -  01-07-24 @ 07:00  --------------------------------------------------------  IN: 185 mL / OUT: 0 mL / NET: 185 mL        PHYSICAL EXAM:  General: NAD; Alert  HEENT:  NCAT; PERRLA, +NGT  Neck: No JVD; supple  Respiratory: b/l scattered rales   Cardiac: RRR s1s2  Gastrointestinal: BS+, soft, NT/ND  Urologic: No delong  Extremities: No peripheral edema  Access:     LABS:                          9.2    10.00 )-----------( 312      ( 07 Jan 2024 06:00 )             28.6     Na(137)/K(5.2)/Cl(109)/HCO3(19)/BUN(21)/Cr(1.35)Glu(301)/Ca(7.8)/Mg(2.20)/PO4(2.2)    01-07 @ 06:00  Na(143)/K(5.0)/Cl(111)/HCO3(20)/BUN(20)/Cr(1.49)Glu(195)/Ca(8.2)/Mg(1.90)/PO4(2.7)    01-06 @ 07:35  Na(142)/K(5.1)/Cl(111)/HCO3(19)/BUN(22)/Cr(1.45)Glu(219)/Ca(8.0)/Mg(1.90)/PO4(3.2)    01-05 @ 22:19  Na(140)/K(5.0)/Cl(111)/HCO3(16)/BUN(24)/Cr(1.41)Glu(199)/Ca(8.1)/Mg(1.90)/PO4(3.3)    01-05 @ 01:00    Urinalysis Basic - ( 07 Jan 2024 06:00 )    Color: x / Appearance: x / SG: x / pH: x  Gluc: 301 mg/dL / Ketone: x  / Bili: x / Urobili: x   Blood: x / Protein: x / Nitrite: x   Leuk Esterase: x / RBC: x / WBC x   Sq Epi: x / Non Sq Epi: x / Bacteria: x            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) who presents to the hospital for COVID19 infection and chest pain  MABLE ----improving   Hypophosphatemia - resolved   Hypertensive urgency -resolved --- BP meds as ordered     1 Renal - renal fxn slight up trending , give ns 75 c/h for 10h   2 hypokalemia-  a-w repletion   3 Hypophosphatemia -repleted  ( might be refeeding)  4 CV - BP soft stable , on Lopressor 25 mg bid , and amlodipine 5 mg daily  5 Vasc - s/p SMA stent placement;   6 Sx - s/p HIDA + for acute asa, medical management, on puree diet , encourage free water in take too (if allowed  with aspiration precautions)   7 GI - s/p EGD clipping of bleeding duodenal ulcers   8 Anemia- hgb stable           [facility]  Office: [phone]     on distress   on  puree  diet     ROS ; no sob   acetaminophen   IVPB .. 1000 milliGRAM(s) IV Intermittent every 6 hours PRN  albuterol/ipratropium for Nebulization 3 milliLiter(s) Nebulizer every 12 hours  amLODIPine   Tablet 5 milliGRAM(s) Oral daily  aspirin  chewable 81 milliGRAM(s) Oral daily  dornase cierra Solution 2.5 milliGRAM(s) Inhalation every 12 hours  escitalopram 10 milliGRAM(s) Oral daily  gabapentin Solution 100 milliGRAM(s) Oral two times a day  heparin   Injectable 5000 Unit(s) SubCutaneous every 8 hours  insulin glargine Injectable (LANTUS) 14 Unit(s) SubCutaneous at bedtime  insulin lispro (ADMELOG) corrective regimen sliding scale   SubCutaneous every 6 hours  levETIRAcetam   Injectable 250 milliGRAM(s) IV Push every 12 hours  melatonin 3 milliGRAM(s) Oral at bedtime  memantine 5 milliGRAM(s) Oral two times a day  metoprolol tartrate Injectable 5 milliGRAM(s) IV Push every 6 hours  mometasone 110 MICROgram(s) Inhaler 2 Puff(s) Inhalation every 12 hours  pantoprazole  Injectable 40 milliGRAM(s) IV Push every 12 hours  ranolazine 500 milliGRAM(s) Oral two times a day  sodium chloride 0.45% with potassium chloride 20 mEq/L 1000 milliLiter(s) IV Continuous <Continuous>  sodium phosphate 30 milliMole(s)/500 mL IVPB 30 milliMole(s) IV Intermittent once  sucralfate 1 Gram(s) Oral every 12 hours  ticagrelor 60 milliGRAM(s) Enteral Tube every 12 hours      VITAL:  T(C): , Max: 37 (01-06-24 @ 21:06)  T(F): , Max: 98.6 (01-06-24 @ 21:06)  HR: 86 (01-07-24 @ 05:45)  BP: 180/82 (01-07-24 @ 05:45)  BP(mean): --  RR: 19 (01-07-24 @ 05:45)  SpO2: 95% (01-07-24 @ 05:45)  Wt(kg): --    01-06-24 @ 07:01  -  01-07-24 @ 07:00  --------------------------------------------------------  IN: 185 mL / OUT: 0 mL / NET: 185 mL        PHYSICAL EXAM:  General: NAD; Alert  HEENT:  NCAT; PERRLA, +NGT  Neck: No JVD; supple  Respiratory: b/l scattered rales   Cardiac: RRR s1s2  Gastrointestinal: BS+, soft, NT/ND  Urologic: No delong  Extremities: No peripheral edema  Access:     LABS:                          9.2    10.00 )-----------( 312      ( 07 Jan 2024 06:00 )             28.6     Na(137)/K(5.2)/Cl(109)/HCO3(19)/BUN(21)/Cr(1.35)Glu(301)/Ca(7.8)/Mg(2.20)/PO4(2.2)    01-07 @ 06:00  Na(143)/K(5.0)/Cl(111)/HCO3(20)/BUN(20)/Cr(1.49)Glu(195)/Ca(8.2)/Mg(1.90)/PO4(2.7)    01-06 @ 07:35  Na(142)/K(5.1)/Cl(111)/HCO3(19)/BUN(22)/Cr(1.45)Glu(219)/Ca(8.0)/Mg(1.90)/PO4(3.2)    01-05 @ 22:19  Na(140)/K(5.0)/Cl(111)/HCO3(16)/BUN(24)/Cr(1.41)Glu(199)/Ca(8.1)/Mg(1.90)/PO4(3.3)    01-05 @ 01:00    Urinalysis Basic - ( 07 Jan 2024 06:00 )    Color: x / Appearance: x / SG: x / pH: x  Gluc: 301 mg/dL / Ketone: x  / Bili: x / Urobili: x   Blood: x / Protein: x / Nitrite: x   Leuk Esterase: x / RBC: x / WBC x   Sq Epi: x / Non Sq Epi: x / Bacteria: x            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  2 hypokalemia-  a-w repletion   3 Hypophosphatemia -repleted  ( might be refeeding)  4 CV - BP soft stable , on Lopressor 25 mg bid , and amlodipine 5 mg daily  5 Vasc - s/p SMA stent placement;   6 Sx - s/p HIDA + for acute asa, medical management, on puree diet , encourage free water in take too (if allowed  with aspiration precautions)   7 GI - s/p EGD clipping of bleeding duodenal ulcers   8 Anemia- hgb stable           [facility]  Office: [phone]     on distress   on  puree  diet   urinary retention     ROS ; no sob   acetaminophen   IVPB .. 1000 milliGRAM(s) IV Intermittent every 6 hours PRN  albuterol/ipratropium for Nebulization 3 milliLiter(s) Nebulizer every 12 hours  amLODIPine   Tablet 5 milliGRAM(s) Oral daily  aspirin  chewable 81 milliGRAM(s) Oral daily  dornase cierra Solution 2.5 milliGRAM(s) Inhalation every 12 hours  escitalopram 10 milliGRAM(s) Oral daily  gabapentin Solution 100 milliGRAM(s) Oral two times a day  heparin   Injectable 5000 Unit(s) SubCutaneous every 8 hours  insulin glargine Injectable (LANTUS) 14 Unit(s) SubCutaneous at bedtime  insulin lispro (ADMELOG) corrective regimen sliding scale   SubCutaneous every 6 hours  levETIRAcetam   Injectable 250 milliGRAM(s) IV Push every 12 hours  melatonin 3 milliGRAM(s) Oral at bedtime  memantine 5 milliGRAM(s) Oral two times a day  metoprolol tartrate Injectable 5 milliGRAM(s) IV Push every 6 hours  mometasone 110 MICROgram(s) Inhaler 2 Puff(s) Inhalation every 12 hours  pantoprazole  Injectable 40 milliGRAM(s) IV Push every 12 hours  ranolazine 500 milliGRAM(s) Oral two times a day  sodium chloride 0.45% with potassium chloride 20 mEq/L 1000 milliLiter(s) IV Continuous <Continuous>  sodium phosphate 30 milliMole(s)/500 mL IVPB 30 milliMole(s) IV Intermittent once  sucralfate 1 Gram(s) Oral every 12 hours  ticagrelor 60 milliGRAM(s) Enteral Tube every 12 hours      VITAL:  T(C): , Max: 37 (01-06-24 @ 21:06)  T(F): , Max: 98.6 (01-06-24 @ 21:06)  HR: 86 (01-07-24 @ 05:45)  BP: 180/82 (01-07-24 @ 05:45)  BP(mean): --  RR: 19 (01-07-24 @ 05:45)  SpO2: 95% (01-07-24 @ 05:45)  Wt(kg): --    01-06-24 @ 07:01  -  01-07-24 @ 07:00  --------------------------------------------------------  IN: 185 mL / OUT: 0 mL / NET: 185 mL        PHYSICAL EXAM:  General: NAD; Alert  HEENT:  NCAT; PERRLA, +NGT  Neck: No JVD; supple  Respiratory: b/l scattered rales   Cardiac: RRR s1s2  Gastrointestinal: BS+, soft, NT/ND  Urologic: No delong  Extremities: No peripheral edema  Access:     LABS:                          9.2    10.00 )-----------( 312      ( 07 Jan 2024 06:00 )             28.6     Na(137)/K(5.2)/Cl(109)/HCO3(19)/BUN(21)/Cr(1.35)Glu(301)/Ca(7.8)/Mg(2.20)/PO4(2.2)    01-07 @ 06:00  Na(143)/K(5.0)/Cl(111)/HCO3(20)/BUN(20)/Cr(1.49)Glu(195)/Ca(8.2)/Mg(1.90)/PO4(2.7)    01-06 @ 07:35  Na(142)/K(5.1)/Cl(111)/HCO3(19)/BUN(22)/Cr(1.45)Glu(219)/Ca(8.0)/Mg(1.90)/PO4(3.2)    01-05 @ 22:19  Na(140)/K(5.0)/Cl(111)/HCO3(16)/BUN(24)/Cr(1.41)Glu(199)/Ca(8.1)/Mg(1.90)/PO4(3.3)    01-05 @ 01:00    Urinalysis Basic - ( 07 Jan 2024 06:00 )    Color: x / Appearance: x / SG: x / pH: x  Gluc: 301 mg/dL / Ketone: x  / Bili: x / Urobili: x   Blood: x / Protein: x / Nitrite: x   Leuk Esterase: x / RBC: x / WBC x   Sq Epi: x / Non Sq Epi: x / Bacteria: x    IMAGES:    PROCEDURE:  CT of the Chest was performed.  Sagittal and coronal reformats were performed.    FINDINGS:    LUNGS/AIRWAYS/PLEURA: Layering secretions in the trachea, left mainstem   bronchus, and right lower lobe bronchus. New small bilateral pleural   effusions with associated complete collapse of the right middle and right   lower lobes and partial atelectasis of the right upper and left lower   lobes. Patchy groundglass opacities in the bilateral aerated lobes. Lung   nodules difficult to visualize due to pulmonary disease.  MEDIASTINUM AND GENA: No lymphadenopathy.  VESSELS: Aortic and coronary artery calcification.  HEART: Heart size is enlarged. No pericardial effusion.  CHEST WALL AND LOWER NECK: Left chest wall pacemaker with leads in the   right atrium and right ventricle. Status post CABG.  VISUALIZED UPPER ABDOMEN: Clips within the stomach, likely from previous   procedure.  BONES: Degenerative changes. Healed sternotomy.    IMPRESSION:  New small bilateral pleural effusions with associated complete collapse   of the right middle and right lower lobes and partial atelectasis of the   right upper and left lower lobes.    Patchy bilateral groundglass opacities are consistent with pulmonary   edema.    Layering secretions in the trachea, left mainstem bronchus, and right   lower lobe bronchus.        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 schedule  bladder scan 8h , straight cath 500 ml.  2 hypokalemia-  a-w repletion   3 Hypophosphatemia -repleted  ( might be refeeding)  4 CV - BP soft stable , on Lopressor 25 mg bid , and amlodipine 5 mg daily ( with parameters)  5 Vasc - s/p SMA stent placement;   6 Sx - s/p HIDA + for acute asa, medical management, on puree diet , encourage free water in take too (if allowed  with aspiration precautions)   7 GI - s/p EGD clipping of bleeding duodenal ulcers   8 Anemia- hgb stable           [facility]  Office: [phone]

## 2024-01-17 NOTE — PROGRESS NOTE ADULT - SUBJECTIVE AND OBJECTIVE BOX
Date of Service: 01-17-24 @ 13:28    Patient is a 89y old  Male who presents with a chief complaint of COVID19, Chest pain (17 Jan 2024 13:13)      Any change in ROS: neuro son at bedside:  he has cough : sitting in chair:  alert and awake: no tin any resp distress     MEDICATIONS  (STANDING):  albuterol/ipratropium for Nebulization 3 milliLiter(s) Nebulizer every 6 hours  amLODIPine   Tablet 5 milliGRAM(s) Oral daily  aspirin  chewable 81 milliGRAM(s) Oral daily  escitalopram 10 milliGRAM(s) Oral daily  gabapentin Solution 100 milliGRAM(s) Oral two times a day  heparin   Injectable 5000 Unit(s) SubCutaneous every 8 hours  insulin glargine Injectable (LANTUS) 14 Unit(s) SubCutaneous at bedtime  insulin lispro (ADMELOG) corrective regimen sliding scale   SubCutaneous at bedtime  insulin lispro (ADMELOG) corrective regimen sliding scale   SubCutaneous three times a day before meals  levETIRAcetam   Injectable 250 milliGRAM(s) IV Push every 12 hours  melatonin 3 milliGRAM(s) Oral at bedtime  memantine 5 milliGRAM(s) Oral two times a day  metoprolol tartrate 25 milliGRAM(s) Oral two times a day  mometasone 110 MICROgram(s) Inhaler 2 Puff(s) Inhalation every 12 hours  pantoprazole  Injectable 40 milliGRAM(s) IV Push every 12 hours  ranolazine 500 milliGRAM(s) Oral two times a day  sodium chloride 0.9%. 1000 milliLiter(s) (75 mL/Hr) IV Continuous <Continuous>  sodium chloride 3%  Inhalation 4 milliLiter(s) Inhalation every 6 hours  sucralfate 1 Gram(s) Oral every 12 hours  ticagrelor 60 milliGRAM(s) Oral every 12 hours    MEDICATIONS  (PRN):  acetaminophen     Tablet .. 650 milliGRAM(s) Oral every 6 hours PRN Temp greater or equal to 38C (100.4F), Mild Pain (1 - 3), Moderate Pain (4 - 6)  guaiFENesin Oral Liquid (Sugar-Free) 100 milliGRAM(s) Oral every 6 hours PRN Cough    Vital Signs Last 24 Hrs  T(C): 36.4 (17 Jan 2024 09:42), Max: 37.1 (16 Jan 2024 21:04)  T(F): 97.5 (17 Jan 2024 09:42), Max: 98.8 (16 Jan 2024 21:04)  HR: 72 (17 Jan 2024 12:41) (71 - 83)  BP: 110/50 (17 Jan 2024 12:41) (100/37 - 135/65)  BP(mean): --  RR: 18 (17 Jan 2024 09:42) (17 - 19)  SpO2: 100% (17 Jan 2024 09:42) (93% - 100%)    Parameters below as of 17 Jan 2024 09:42  Patient On (Oxygen Delivery Method): nasal cannula  O2 Flow (L/min): 3      I&O's Summary    16 Jan 2024 07:01  -  17 Jan 2024 07:00  --------------------------------------------------------  IN: 120 mL / OUT: 100 mL / NET: 20 mL          Physical Exam:   GENERAL: NAD, well-groomed, well-developed  HEENT: JAVAD/   Atraumatic, Normocephalic  ENMT: No tonsillar erythema, exudates, or enlargement; Moist mucous membranes, Good dentition, No lesions  NECK: Supple, No JVD, Normal thyroid  CHEST/LUNG: decreased air e ntery   CVS: Regular rate and rhythm; No murmurs, rubs, or gallops  GI: : Soft, Nontender, Nondistended; Bowel sounds present  NERVOUS SYSTEM:  Alert & awake  EXTREMITIES:  - edema  LYMPH: No lymphadenopathy noted  SKIN: No rashes or lesions  ENDOCRINOLOGY: No Thyromegaly  PSYCH: Appropriate    Labs:  37                            9.0    7.43  )-----------( 254      ( 17 Jan 2024 06:17 )             28.5                         9.6    7.10  )-----------( 298      ( 16 Jan 2024 06:28 )             30.6     01-17    141  |  101  |  16  ----------------------------<  103<H>  3.3<L>   |  31  |  1.57<H>  01-16    139  |  98  |  16  ----------------------------<  147<H>  3.3<L>   |  31  |  1.58<H>    Ca    8.3<L>      17 Jan 2024 06:17  Ca    8.4      16 Jan 2024 06:28  Phos  2.2     01-17  Phos  2.2     01-16  Mg     1.90     01-17  Mg     1.70     01-16      CAPILLARY BLOOD GLUCOSE      POCT Blood Glucose.: 146 mg/dL (17 Jan 2024 12:46)  POCT Blood Glucose.: 90 mg/dL (17 Jan 2024 08:46)  POCT Blood Glucose.: 207 mg/dL (16 Jan 2024 21:39)  POCT Blood Glucose.: 116 mg/dL (16 Jan 2024 18:27)          Urinalysis Basic - ( 17 Jan 2024 06:17 )    Color: x / Appearance: x / SG: x / pH: x  Gluc: 103 mg/dL / Ketone: x  / Bili: x / Urobili: x   Blood: x / Protein: x / Nitrite: x   Leuk Esterase: x / RBC: x / WBC x   Sq Epi: x / Non Sq Epi: x / Bacteria: x      rad< from: CT Chest No Cont (01.16.24 @ 18:04) >    PROCEDURE DATE:  01/16/2024          INTERPRETATION:  CLINICAL INDICATION: Possible aspiration pneumonia.   Found to have Covid 19 infection.    COMPARISON: CTA chest abdomen and pelvis 12/24/2023    PROCEDURE:  CT of the Chest was performed.  Sagittal and coronal reformats were performed.    FINDINGS:    LUNGS/AIRWAYS/PLEURA: Layering secretions in the trachea, left mainstem   bronchus, and right lower lobe bronchus. New small bilateral pleural   effusions with associated complete collapse of the right middle and right   lower lobes and partial atelectasis of the right upper and left lower   lobes. Patchy groundglass opacities in the bilateral aerated lobes. Lung   nodules difficult to visualize due to pulmonary disease.  MEDIASTINUM AND GENA: No lymphadenopathy.  VESSELS: Aortic and coronary artery calcification.  HEART: Heart size is enlarged. No pericardial effusion.  CHEST WALL AND LOWER NECK: Left chest wall pacemaker with leads in the   right atrium and right ventricle. Status post CABG.  VISUALIZED UPPER ABDOMEN: Clips within the stomach, likely from previous   procedure.  BONES: Degenerative changes. Healed sternotomy.    IMPRESSION:  New small bilateral pleural effusions with associated complete collapse   of the right middle and right lower lobes and partial atelectasis of the   right upper and left lower lobes.    Patchy bilateral groundglass opacities are consistent with pulmonary   edema.    Layering secretions in the trachea, left mainstem bronchus, and right   lower lobe bronchus.    --- End of Report ---          STEFFANY PATEL MD; Resident Radiologist  This document has been electronically signed.  URIEL MOTLEY DO; Attending Radiologist  Thisdocument has been electronically signed. Jan 17 2024 12:13PM    < end of copied text >  < from: TTE W or WO Ultrasound Enhancing Agent (12.25.23 @ 11:09) >  Study Information:  Image quality for this study is technically difficult.    _______________________________________________________________________________________     CONCLUSIONS:      1. Technically difficult image quality.   2. Left ventricular cavity is normal. Left ventricular wallthickness is normal. Left ventricular systolic function is normal with an ejection fraction of 62 % by Florence's method of disks. There are no regional wall motion abnormalities seen.   3. Normal right ventricular cavity size and probably normal systolic function.   4. Structurally normal mitral valve with normal leaflet excursion. There is calcification of the mitral valve annulus. There is mild mitral regurgitation.   5. Aortic valve leaflet morphology not well visualized. with reduced systolicexcursion. There is calcification of the aortic valve leaflets. The peak transaortic velocity is 2.47 m/s, peak transaortic gradient is 24.4 mmHg and mean transaortic gradient is 14.0 mmHg with an LVOT/aortic valve VTI ratio of 0.20. Probable mild tomoderate aortic stenosis. There is mild aortic regurgitation.   6. No prior echocardiogram is available for comparison.    < end of copied text >        RECENT CULTURES:        RESPIRATORY CULTURES:          Studies  Chest X-RAY  CT SCAN Chest   Venous Dopplers: LE:   CT Abdomen  Others

## 2024-01-17 NOTE — PROGRESS NOTE ADULT - SUBJECTIVE AND OBJECTIVE BOX
Date of Service  : 01-17-24     INTERVAL HPI/OVERNIGHT EVENTS: Seen and examined earlier with olive in room. More awake and ate better .   Vital Signs Last 24 Hrs  T(C): 36.8 (17 Jan 2024 16:58), Max: 37.1 (16 Jan 2024 21:04)  T(F): 98.2 (17 Jan 2024 16:58), Max: 98.8 (16 Jan 2024 21:04)  HR: 75 (17 Jan 2024 16:58) (71 - 83)  BP: 133/60 (17 Jan 2024 16:58) (100/37 - 135/65)  BP(mean): --  RR: 17 (17 Jan 2024 16:58) (17 - 19)  SpO2: 94% (17 Jan 2024 16:58) (93% - 100%)    Parameters below as of 17 Jan 2024 16:58  Patient On (Oxygen Delivery Method): nasal cannula      I&O's Summary    16 Jan 2024 07:01  -  17 Jan 2024 07:00  --------------------------------------------------------  IN: 120 mL / OUT: 100 mL / NET: 20 mL    17 Jan 2024 07:01  -  17 Jan 2024 17:27  --------------------------------------------------------  IN: 0 mL / OUT: 500 mL / NET: -500 mL      MEDICATIONS  (STANDING):  albuterol/ipratropium for Nebulization 3 milliLiter(s) Nebulizer every 6 hours  amLODIPine   Tablet 5 milliGRAM(s) Oral daily  aspirin  chewable 81 milliGRAM(s) Oral daily  escitalopram 10 milliGRAM(s) Oral daily  gabapentin Solution 100 milliGRAM(s) Oral two times a day  heparin   Injectable 5000 Unit(s) SubCutaneous every 8 hours  insulin glargine Injectable (LANTUS) 14 Unit(s) SubCutaneous at bedtime  insulin lispro (ADMELOG) corrective regimen sliding scale   SubCutaneous at bedtime  insulin lispro (ADMELOG) corrective regimen sliding scale   SubCutaneous three times a day before meals  levETIRAcetam   Injectable 250 milliGRAM(s) IV Push every 12 hours  melatonin 3 milliGRAM(s) Oral at bedtime  memantine 5 milliGRAM(s) Oral two times a day  metoprolol tartrate 25 milliGRAM(s) Oral two times a day  mometasone 110 MICROgram(s) Inhaler 2 Puff(s) Inhalation every 12 hours  pantoprazole  Injectable 40 milliGRAM(s) IV Push every 12 hours  ranolazine 500 milliGRAM(s) Oral two times a day  sodium chloride 3%  Inhalation 4 milliLiter(s) Inhalation every 6 hours  sucralfate 1 Gram(s) Oral every 12 hours  ticagrelor 60 milliGRAM(s) Oral every 12 hours    MEDICATIONS  (PRN):  acetaminophen     Tablet .. 650 milliGRAM(s) Oral every 6 hours PRN Temp greater or equal to 38C (100.4F), Mild Pain (1 - 3), Moderate Pain (4 - 6)  guaiFENesin Oral Liquid (Sugar-Free) 100 milliGRAM(s) Oral every 6 hours PRN Cough    LABS:                        9.0    7.43  )-----------( 254      ( 17 Jan 2024 06:17 )             28.5     01-17    141  |  101  |  16  ----------------------------<  103<H>  3.3<L>   |  31  |  1.57<H>    Ca    8.3<L>      17 Jan 2024 06:17  Phos  2.2     01-17  Mg     1.90     01-17        Urinalysis Basic - ( 17 Jan 2024 06:17 )    Color: x / Appearance: x / SG: x / pH: x  Gluc: 103 mg/dL / Ketone: x  / Bili: x / Urobili: x   Blood: x / Protein: x / Nitrite: x   Leuk Esterase: x / RBC: x / WBC x   Sq Epi: x / Non Sq Epi: x / Bacteria: x      CAPILLARY BLOOD GLUCOSE      POCT Blood Glucose.: 146 mg/dL (17 Jan 2024 12:46)  POCT Blood Glucose.: 90 mg/dL (17 Jan 2024 08:46)  POCT Blood Glucose.: 207 mg/dL (16 Jan 2024 21:39)  POCT Blood Glucose.: 116 mg/dL (16 Jan 2024 18:27)        Urinalysis Basic - ( 17 Jan 2024 06:17 )    Color: x / Appearance: x / SG: x / pH: x  Gluc: 103 mg/dL / Ketone: x  / Bili: x / Urobili: x   Blood: x / Protein: x / Nitrite: x   Leuk Esterase: x / RBC: x / WBC x   Sq Epi: x / Non Sq Epi: x / Bacteria: x          RADIOLOGY & ADDITIONAL TESTS:    Consultant(s) Notes Reviewed:  [x ] YES  [ ] NO    PHYSICAL EXAM:  GENERAL: Not in any distress ,NC Oxygen   HEAD:  Atraumatic, Normocephalic  NECK: Supple, No JVD, Normal thyroid  NERVOUS SYSTEM:  Alert &  No focal deficit   CHEST/LUNG: Good air entry bilateral  except bases   HEART: Regular rate and rhythm; No murmurs, rubs, or gallops  ABDOMEN: Soft, Nontender, Nondistended; Bowel sounds present  EXTREMITIES:  + edema      Care Discussed with Consultants/Other Providers [ x] YES  [ ] NO

## 2024-01-18 LAB
ANION GAP SERPL CALC-SCNC: 9 MMOL/L — SIGNIFICANT CHANGE UP (ref 7–14)
BUN SERPL-MCNC: 15 MG/DL — SIGNIFICANT CHANGE UP (ref 7–23)
CALCIUM SERPL-MCNC: 8.3 MG/DL — LOW (ref 8.4–10.5)
CHLORIDE SERPL-SCNC: 101 MMOL/L — SIGNIFICANT CHANGE UP (ref 98–107)
CO2 SERPL-SCNC: 29 MMOL/L — SIGNIFICANT CHANGE UP (ref 22–31)
CREAT SERPL-MCNC: 1.55 MG/DL — HIGH (ref 0.5–1.3)
EGFR: 42 ML/MIN/1.73M2 — LOW
GLUCOSE BLDC GLUCOMTR-MCNC: 116 MG/DL — HIGH (ref 70–99)
GLUCOSE BLDC GLUCOMTR-MCNC: 127 MG/DL — HIGH (ref 70–99)
GLUCOSE BLDC GLUCOMTR-MCNC: 197 MG/DL — HIGH (ref 70–99)
GLUCOSE BLDC GLUCOMTR-MCNC: 259 MG/DL — HIGH (ref 70–99)
GLUCOSE SERPL-MCNC: 105 MG/DL — HIGH (ref 70–99)
HCT VFR BLD CALC: 27.9 % — LOW (ref 39–50)
HGB BLD-MCNC: 8.9 G/DL — LOW (ref 13–17)
MAGNESIUM SERPL-MCNC: 1.9 MG/DL — SIGNIFICANT CHANGE UP (ref 1.6–2.6)
MCHC RBC-ENTMCNC: 30.7 PG — SIGNIFICANT CHANGE UP (ref 27–34)
MCHC RBC-ENTMCNC: 31.9 GM/DL — LOW (ref 32–36)
MCV RBC AUTO: 96.2 FL — SIGNIFICANT CHANGE UP (ref 80–100)
NRBC # BLD: 0 /100 WBCS — SIGNIFICANT CHANGE UP (ref 0–0)
NRBC # FLD: 0 K/UL — SIGNIFICANT CHANGE UP (ref 0–0)
PHOSPHATE SERPL-MCNC: 2.3 MG/DL — LOW (ref 2.5–4.5)
PLATELET # BLD AUTO: 277 K/UL — SIGNIFICANT CHANGE UP (ref 150–400)
POTASSIUM SERPL-MCNC: 3.5 MMOL/L — SIGNIFICANT CHANGE UP (ref 3.5–5.3)
POTASSIUM SERPL-SCNC: 3.5 MMOL/L — SIGNIFICANT CHANGE UP (ref 3.5–5.3)
RBC # BLD: 2.9 M/UL — LOW (ref 4.2–5.8)
RBC # FLD: 17.9 % — HIGH (ref 10.3–14.5)
SODIUM SERPL-SCNC: 139 MMOL/L — SIGNIFICANT CHANGE UP (ref 135–145)
WBC # BLD: 7.4 K/UL — SIGNIFICANT CHANGE UP (ref 3.8–10.5)
WBC # FLD AUTO: 7.4 K/UL — SIGNIFICANT CHANGE UP (ref 3.8–10.5)

## 2024-01-18 PROCEDURE — 93280 PM DEVICE PROGR EVAL DUAL: CPT | Mod: 26

## 2024-01-18 PROCEDURE — 95718 EEG PHYS/QHP 2-12 HR W/VEEG: CPT

## 2024-01-18 RX ORDER — SODIUM,POTASSIUM PHOSPHATES 278-250MG
1 POWDER IN PACKET (EA) ORAL ONCE
Refills: 0 | Status: COMPLETED | OUTPATIENT
Start: 2024-01-18 | End: 2024-01-18

## 2024-01-18 RX ORDER — CHLORHEXIDINE GLUCONATE 213 G/1000ML
1 SOLUTION TOPICAL DAILY
Refills: 0 | Status: DISCONTINUED | OUTPATIENT
Start: 2024-01-18 | End: 2024-02-16

## 2024-01-18 RX ADMIN — PANTOPRAZOLE SODIUM 40 MILLIGRAM(S): 20 TABLET, DELAYED RELEASE ORAL at 17:20

## 2024-01-18 RX ADMIN — TICAGRELOR 60 MILLIGRAM(S): 90 TABLET ORAL at 06:53

## 2024-01-18 RX ADMIN — AMLODIPINE BESYLATE 5 MILLIGRAM(S): 2.5 TABLET ORAL at 06:53

## 2024-01-18 RX ADMIN — Medication 81 MILLIGRAM(S): at 13:11

## 2024-01-18 RX ADMIN — Medication 3 MILLILITER(S): at 03:36

## 2024-01-18 RX ADMIN — Medication 1 GRAM(S): at 06:53

## 2024-01-18 RX ADMIN — Medication 25 MILLIGRAM(S): at 17:21

## 2024-01-18 RX ADMIN — Medication 3 MILLILITER(S): at 15:59

## 2024-01-18 RX ADMIN — MEMANTINE HYDROCHLORIDE 5 MILLIGRAM(S): 10 TABLET ORAL at 17:21

## 2024-01-18 RX ADMIN — MEMANTINE HYDROCHLORIDE 5 MILLIGRAM(S): 10 TABLET ORAL at 06:52

## 2024-01-18 RX ADMIN — CHLORHEXIDINE GLUCONATE 1 APPLICATION(S): 213 SOLUTION TOPICAL at 17:22

## 2024-01-18 RX ADMIN — LEVETIRACETAM 250 MILLIGRAM(S): 250 TABLET, FILM COATED ORAL at 17:29

## 2024-01-18 RX ADMIN — GABAPENTIN 100 MILLIGRAM(S): 400 CAPSULE ORAL at 06:53

## 2024-01-18 RX ADMIN — Medication 3 MILLIGRAM(S): at 21:51

## 2024-01-18 RX ADMIN — LEVETIRACETAM 250 MILLIGRAM(S): 250 TABLET, FILM COATED ORAL at 06:52

## 2024-01-18 RX ADMIN — HEPARIN SODIUM 5000 UNIT(S): 5000 INJECTION INTRAVENOUS; SUBCUTANEOUS at 21:51

## 2024-01-18 RX ADMIN — INSULIN GLARGINE 14 UNIT(S): 100 INJECTION, SOLUTION SUBCUTANEOUS at 21:50

## 2024-01-18 RX ADMIN — Medication 3 MILLILITER(S): at 22:14

## 2024-01-18 RX ADMIN — Medication 100 MILLIGRAM(S): at 18:37

## 2024-01-18 RX ADMIN — SODIUM CHLORIDE 4 MILLILITER(S): 9 INJECTION INTRAMUSCULAR; INTRAVENOUS; SUBCUTANEOUS at 09:05

## 2024-01-18 RX ADMIN — Medication 20 MILLIGRAM(S): at 06:52

## 2024-01-18 RX ADMIN — RANOLAZINE 500 MILLIGRAM(S): 500 TABLET, FILM COATED, EXTENDED RELEASE ORAL at 06:53

## 2024-01-18 RX ADMIN — Medication 100 MILLIGRAM(S): at 00:25

## 2024-01-18 RX ADMIN — Medication 25 MILLIGRAM(S): at 06:52

## 2024-01-18 RX ADMIN — SODIUM CHLORIDE 4 MILLILITER(S): 9 INJECTION INTRAMUSCULAR; INTRAVENOUS; SUBCUTANEOUS at 16:00

## 2024-01-18 RX ADMIN — SODIUM CHLORIDE 4 MILLILITER(S): 9 INJECTION INTRAMUSCULAR; INTRAVENOUS; SUBCUTANEOUS at 03:36

## 2024-01-18 RX ADMIN — PANTOPRAZOLE SODIUM 40 MILLIGRAM(S): 20 TABLET, DELAYED RELEASE ORAL at 06:53

## 2024-01-18 RX ADMIN — Medication 1 PACKET(S): at 13:10

## 2024-01-18 RX ADMIN — Medication 6: at 18:32

## 2024-01-18 RX ADMIN — HEPARIN SODIUM 5000 UNIT(S): 5000 INJECTION INTRAVENOUS; SUBCUTANEOUS at 06:52

## 2024-01-18 RX ADMIN — SODIUM CHLORIDE 4 MILLILITER(S): 9 INJECTION INTRAMUSCULAR; INTRAVENOUS; SUBCUTANEOUS at 22:19

## 2024-01-18 RX ADMIN — Medication 1 GRAM(S): at 17:21

## 2024-01-18 RX ADMIN — Medication 3 MILLILITER(S): at 09:05

## 2024-01-18 RX ADMIN — MOMETASONE FUROATE 2 PUFF(S): 220 INHALANT RESPIRATORY (INHALATION) at 21:52

## 2024-01-18 RX ADMIN — Medication 1 PACKET(S): at 10:09

## 2024-01-18 RX ADMIN — ESCITALOPRAM OXALATE 10 MILLIGRAM(S): 10 TABLET, FILM COATED ORAL at 13:12

## 2024-01-18 RX ADMIN — TICAGRELOR 60 MILLIGRAM(S): 90 TABLET ORAL at 17:21

## 2024-01-18 RX ADMIN — HEPARIN SODIUM 5000 UNIT(S): 5000 INJECTION INTRAVENOUS; SUBCUTANEOUS at 13:12

## 2024-01-18 RX ADMIN — MOMETASONE FUROATE 2 PUFF(S): 220 INHALANT RESPIRATORY (INHALATION) at 10:09

## 2024-01-18 RX ADMIN — RANOLAZINE 500 MILLIGRAM(S): 500 TABLET, FILM COATED, EXTENDED RELEASE ORAL at 17:21

## 2024-01-18 RX ADMIN — GABAPENTIN 100 MILLIGRAM(S): 400 CAPSULE ORAL at 17:20

## 2024-01-18 NOTE — PROGRESS NOTE ADULT - SUBJECTIVE AND OBJECTIVE BOX
Date of Service  : 01-18-24     INTERVAL HPI/OVERNIGHT EVENTS: More awake and communicative today . Still coughing. Son by bedside.   Vital Signs Last 24 Hrs  T(C): 36.2 (18 Jan 2024 06:51), Max: 36.8 (17 Jan 2024 16:58)  T(F): 97.1 (18 Jan 2024 06:51), Max: 98.2 (17 Jan 2024 16:58)  HR: 76 (18 Jan 2024 09:12) (71 - 82)  BP: 144/53 (18 Jan 2024 06:51) (100/37 - 144/53)  BP(mean): --  RR: 18 (18 Jan 2024 09:12) (17 - 18)  SpO2: 97% (18 Jan 2024 09:12) (94% - 100%)    Parameters below as of 18 Jan 2024 09:12  Patient On (Oxygen Delivery Method): nasal cannula  O2 Flow (L/min): 3    I&O's Summary    17 Jan 2024 07:01  -  18 Jan 2024 07:00  --------------------------------------------------------  IN: 120 mL / OUT: 500 mL / NET: -380 mL      MEDICATIONS  (STANDING):  albuterol/ipratropium for Nebulization 3 milliLiter(s) Nebulizer every 6 hours  amLODIPine   Tablet 5 milliGRAM(s) Oral daily  aspirin  chewable 81 milliGRAM(s) Oral daily  escitalopram 10 milliGRAM(s) Oral daily  furosemide    Tablet 20 milliGRAM(s) Oral daily  gabapentin Solution 100 milliGRAM(s) Oral two times a day  heparin   Injectable 5000 Unit(s) SubCutaneous every 8 hours  insulin glargine Injectable (LANTUS) 14 Unit(s) SubCutaneous at bedtime  insulin lispro (ADMELOG) corrective regimen sliding scale   SubCutaneous at bedtime  insulin lispro (ADMELOG) corrective regimen sliding scale   SubCutaneous three times a day before meals  levETIRAcetam   Injectable 250 milliGRAM(s) IV Push every 12 hours  melatonin 3 milliGRAM(s) Oral at bedtime  memantine 5 milliGRAM(s) Oral two times a day  metoprolol tartrate 25 milliGRAM(s) Oral two times a day  mometasone 110 MICROgram(s) Inhaler 2 Puff(s) Inhalation every 12 hours  pantoprazole  Injectable 40 milliGRAM(s) IV Push every 12 hours  potassium phosphate / sodium phosphate Powder (PHOS-NaK) 1 Packet(s) Oral once  ranolazine 500 milliGRAM(s) Oral two times a day  sodium chloride 3%  Inhalation 4 milliLiter(s) Inhalation every 6 hours  sucralfate 1 Gram(s) Oral every 12 hours  ticagrelor 60 milliGRAM(s) Oral every 12 hours    MEDICATIONS  (PRN):  acetaminophen     Tablet .. 650 milliGRAM(s) Oral every 6 hours PRN Temp greater or equal to 38C (100.4F), Mild Pain (1 - 3), Moderate Pain (4 - 6)  guaiFENesin Oral Liquid (Sugar-Free) 100 milliGRAM(s) Oral every 6 hours PRN Cough    LABS:                        8.9    7.40  )-----------( 277      ( 18 Jan 2024 06:17 )             27.9     01-18    139  |  101  |  15  ----------------------------<  105<H>  3.5   |  29  |  1.55<H>    Ca    8.3<L>      18 Jan 2024 06:17  Phos  2.3     01-18  Mg     1.90     01-18        Urinalysis Basic - ( 18 Jan 2024 06:17 )    Color: x / Appearance: x / SG: x / pH: x  Gluc: 105 mg/dL / Ketone: x  / Bili: x / Urobili: x   Blood: x / Protein: x / Nitrite: x   Leuk Esterase: x / RBC: x / WBC x   Sq Epi: x / Non Sq Epi: x / Bacteria: x      CAPILLARY BLOOD GLUCOSE      POCT Blood Glucose.: 116 mg/dL (18 Jan 2024 08:59)  POCT Blood Glucose.: 179 mg/dL (17 Jan 2024 21:00)  POCT Blood Glucose.: 191 mg/dL (17 Jan 2024 18:06)  POCT Blood Glucose.: 146 mg/dL (17 Jan 2024 12:46)        Urinalysis Basic - ( 18 Jan 2024 06:17 )    Color: x / Appearance: x / SG: x / pH: x  Gluc: 105 mg/dL / Ketone: x  / Bili: x / Urobili: x   Blood: x / Protein: x / Nitrite: x   Leuk Esterase: x / RBC: x / WBC x   Sq Epi: x / Non Sq Epi: x / Bacteria: x      REVIEW OF SYSTEMS:  CONSTITUTIONAL: No fever, weight loss, or fatigue  EYES: No eye pain, visual disturbances, or discharge  ENMT:  No difficulty hearing, tinnitus, vertigo; No sinus or throat pain  NECK: No pain or stiffness  RESPIRATORY: No cough, wheezing, chills or hemoptysis; No shortness of breath  CARDIOVASCULAR: No chest pain, palpitations, dizziness, or leg swelling  GASTROINTESTINAL: No abdominal or epigastric pain. No nausea, vomiting, or hematemesis; No diarrhea or constipation. No melena or hematochezia.  GENITOURINARY: No dysuria, frequency, hematuria, or incontinence  NEUROLOGICAL: No headaches, memory loss, loss of strength, numbness, or tremors      Consultant(s) Notes Reviewed:  [x ] YES  [ ] NO    PHYSICAL EXAM:  GENERAL: NAD, well-groomed, well-developed,not in any distress ,  HEAD:  Atraumatic, Normocephalic  NECK: Supple, No JVD, Normal thyroid  NERVOUS SYSTEM:  Alert & Oriented X3, No focal deficit   CHEST/LUNG: Good air entry bilateral except bases   HEART: Regular rate and rhythm; No murmurs, rubs, or gallops  ABDOMEN: Soft, Nontender, Nondistended; Bowel sounds present  EXTREMITIES:  + edema    Care Discussed with Consultants/Other Providers [ x] YES  [ ] NO

## 2024-01-18 NOTE — PROGRESS NOTE ADULT - ASSESSMENT
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 Patient states that he has been having a dry cough for the past week, worsened over the past few days. Denies SOB with the cough, denies hemoptysis. Reports fevers at home to 101.5 with associated diaphoresis. Said that he has significant pinprick like b/l chest pain with his cough but denies any chest pain when not coughing or with ambulation. Also reports rhinorrhea and sore throat. Reports generalized weakness and poor appetite. States his daughter was visiting him over the week end last week and she was positive for COVID19. Patient tested positive for COVID19 2 days prior and was started on paxlovid as outpatient. Of note, family states that the patient has had worsening confusion at home over the past 6 months (becoming disoriented to time) but recently has been more confused, talking about seeing people that are not present.   On arrival to the ED his vitals were T 98 -> 99.2, P 80, /72, RR 18, ) sat 100% RA. His lab work showed leukocytosis with neutrophilia and bandemia, MABLE, mild hyponatremia, indeterminant but stable troponins, and elevated lactate to 2.3. His COVID19 swab was positive. His CXR showed bibasilar crackles.  (23 Dec 2023 22:51):  pt has been here for past two weeks and now pulm called fro excessive secretions   he is able to answer simple questions:  grand sons at bedside:     Covid / Resp failure/on 2 L of oxygen  :  CADS/P CABG  DM  CKD  HTN  CVA X 3    Covid / Resp failure/on 2 L of oxygen:  -he was treated for covid before:   -he has excessive secretions  -keep o2 sao2 above 90%  -add BD and mucomyst: ;  -add mucinex:   1/10: he seems to be doing  ok: cont mucomyst and bd   1/11 ?: add benzonatate and Robitussin prm : dc dornase  1/12: seems OK: no sob:  no cough : no phlegm : has gurgling sound in throat:  looks comfortable  1/14: he is on room air:  with good sao2:  finished covid rx:  cont current rx:  high risk for aspiration as he has gurgling secretions in throat   1/15: would do ct chest he seems to have increasing effusions:  his ct scan from last week of december:  has not much of effusions: however will try lasix : [facility]    1/16: doing  ok : no os:b has secretions still : change to percussion bed:  cont bd  1/17: seems stable:  no sob: on 3 L of oxygen : should add ATC lasix to me as he has formed new pleural effusions:  echo with mod AS   1/18; [facility] pts son : who is a neurologist:  she has secretions in chest with atelectasis and yovani effusions with increased secretions:  the son requests bronchoscopy:  I have explained the advantages and disadvantages of the bronch at this ago and he might not able able to be extubated soon after procedure:  but they want to go ahead with it: IP called   He will remain art risk for recurrent aspiration and atelectasis even after the procedure and they understand that    CADS/P CABG  -cont current medications   DM  monitor and control   CKD  -cont current meds   HTN   -controlled  CVA X 3  -doing ok :     [facility] family  and team  GI Bleed:   -secondary to Dieulafoy's lesion:  defer to primary team :    dvt prophylaxis    dwacp

## 2024-01-18 NOTE — SWALLOW BEDSIDE ASSESSMENT ADULT - ADDITIONAL RECOMMENDATIONS
This department to follow up as schedule permits to reassess for safe oral diet program. Medical team further advised to reconsult as patient becomes medically optimized.
This department to follow up to assess for diet tolerance. Medical team further advised to reconsult if there is a change in medical status and/or observed change in patient's tolerance of recommended PO diet.
1. This service to follow up as schedule permits for diet tolerance. 2. Reconsult if change in medical status.

## 2024-01-18 NOTE — PROGRESS NOTE ADULT - ASSESSMENT
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 CTA demonstrating mesenteric fat stranding associated with ascending/transverse colon. S/p SMA angiography with stent placement with diagnostic laparoscopy 12/24, small bowel and visible colon viable, some inflammation of omentum in RUQ. Course c/b GI bleed, s/p scope on 1/2 with GI with clips placed. Patient transferred overnight from SICU to the floor in clinically stable condition.       Problem/Recommendation - 1:  ·  Problem: Type 2 diabetes mellitus with hyperglycemia.   ·  Recommendation: sugars better now.   Continue Lantus and SSI.     Problem/Recommendation - 2:  ·  Problem: Acute renal failure superimposed on chronic kidney disease.   ·  Recommendation: Creatinine stable.      Problem/Recommendation - 3:  ·  Problem: CAD (coronary artery disease).   ·  Recommendation: S/P CABG and Stents. On DAPT and BB.  cardiology help appreciated.     Problem/Recommendation - 4:  ·  Problem: Essential hypertension.   ·  Recommendation: BP meds with hold parameters.     Problem/Recommendation - 5:  ·  Problem: GI bleed.   ·  Recommendation: On PPI.  S/P EGD.   Impression:          - NG tube removed before endoscopy                       - LA Grade B esophagitis.                       - Bleeding Dieulafoy's lesion of the posterior wall of                        the gastric body. This is likely the source of                        clinically significant bleeding. Hemostasis achieved                        using 2 MR conditional hemoclips.                       - Bleeding edematous mucosa and nonbleeding clean based                        gastric ulcers in the posterior wall of the gastric                        body. These are likely due to NG tube irritation.                        Hemostasis achieved using 1 MR conditional hemoclip.                       - Non-bleeding small clean based duodenal ulcer                       - No specimens collected.    < end of copied text >.     Problem/Recommendation - 6:  ·  Problem: Myoclonic jerking.   ·  Recommendation: On keppra.     Problem/Recommendation - 7:  ·  Problem: Dysphagia.   ·  Recommendation:  S/P  cineesophagogram .  Puree diet     Problem/Recommendation - 8:  ·  Problem: Abdominal distension.   ·  Recommendation: S/P  SMA angiography with stent placement with diagnostic laparoscopy 12/24,  Vascular following.     Problem/Recommendation - 9:  ·  Problem: Anemia due to acute blood loss.   ·  Recommendation: S/P PRBC.   HH stable.       Problem/Recommendation - 10:  ·  Problem: Toxic metabolic encephalopathy.   ·  Recommendation: Mental status waxes and wanes .  D/W Neurology attending and will see patient .   MRI pending.   < from: CT Head No Cont (01.16.24 @ 18:01) >    IMPRESSION:    No evidence of acute intracranial hemorrhage, midline shift or CT   evidence of acute territorial infarct.  Partial opacificationof the left middle ear and mastoid air cells.   Correlate clinically for otomastoiditis.  If the patient's symptoms persist, consider short interval follow-up head   CT or brain MRI if there are no MRI contraindications.    Will get ENT consult in AM.    Problem/Recommendation - 11:  ·  Problem: 2019 novel coronavirus disease (COVID-19).   ·  Recommendation: S/P Renmdisvir.     Problem/Recommendation - 12:  ·  Problem: Pleural effusion  with Leg edema    ·  Recommendation:  Diuretics PRN . ,  Doppler negative for  DVT.  On Gabapentin  < from: TTE W or WO Ultrasound Enhancing Agent (12.25.23 @ 11:09) >  CONCLUSIONS:      1. Technically difficult image quality.   2. Left ventricular cavity is normal. Left ventricular wallthickness is normal. Left ventricular systolic function is normal with an ejection fraction of 62 % by Florence's method of disks. There are no regional wall motion abnormalities seen.   3. Normal right ventricular cavity size and probably normal systolic function.   4. Structurally normal mitral valve with normal leaflet excursion. There is calcification of the mitral valve annulus. There is mild mitral regurgitation.   5. Aortic valve leaflet morphology not well visualized. with reduced systolicexcursion. There is calcification of the aortic valve leaflets. The peak transaortic velocity is 2.47 m/s, peak transaortic gradient is 24.4 mmHg and mean transaortic gradient is 14.0 mmHg with an LVOT/aortic valve VTI ratio of 0.20. Probable mild tomoderate aortic stenosis. There is mild aortic regurgitation.   6. No prior echocardiogram is available for comparison.    < end of copied text >     Problem/Recommendation - 13:  ·  Problem: Persistent Cough ? Aspiration Pneumonia with B/L pleural Effusion Cllapse of middle lobe.   ·  Recommendation:   Cough suppressants .  Pulmonary helping   Family wants to talk about  Bronchoscopy .    < from: CT Chest No Cont (01.16.24 @ 18:04) >  IMPRESSION:  Occluded right lower lobe bronchus with right lower lobar collapse.  Partially occluded right middle lobe bronchus with near complete right   middle lobar collapse.  Bilateral upper and left lower lobe nodular and groundglass opacities,   predominantly in the right upper lobe.  Moderate-sized bilateral pleural effusions.        D/W Son in room.   Dispo : DC planning to [facility] pending  above.

## 2024-01-18 NOTE — PROGRESS NOTE ADULT - SUBJECTIVE AND OBJECTIVE BOX
Date of Service: 01-18-24 @ 12:21    Patient is a 89y old  Male who presents with a chief complaint of COVID19, Chest pain (18 Jan 2024 11:11)      Any change in ROS: son at bedside:  pt has cough  and has high aspiration risk : he desats quickly though only on 3 L of oxygen     MEDICATIONS  (STANDING):  albuterol/ipratropium for Nebulization 3 milliLiter(s) Nebulizer every 6 hours  amLODIPine   Tablet 5 milliGRAM(s) Oral daily  aspirin  chewable 81 milliGRAM(s) Oral daily  escitalopram 10 milliGRAM(s) Oral daily  furosemide    Tablet 20 milliGRAM(s) Oral daily  gabapentin Solution 100 milliGRAM(s) Oral two times a day  heparin   Injectable 5000 Unit(s) SubCutaneous every 8 hours  insulin glargine Injectable (LANTUS) 14 Unit(s) SubCutaneous at bedtime  insulin lispro (ADMELOG) corrective regimen sliding scale   SubCutaneous at bedtime  insulin lispro (ADMELOG) corrective regimen sliding scale   SubCutaneous three times a day before meals  levETIRAcetam   Injectable 250 milliGRAM(s) IV Push every 12 hours  melatonin 3 milliGRAM(s) Oral at bedtime  memantine 5 milliGRAM(s) Oral two times a day  metoprolol tartrate 25 milliGRAM(s) Oral two times a day  mometasone 110 MICROgram(s) Inhaler 2 Puff(s) Inhalation every 12 hours  pantoprazole  Injectable 40 milliGRAM(s) IV Push every 12 hours  potassium phosphate / sodium phosphate Powder (PHOS-NaK) 1 Packet(s) Oral once  ranolazine 500 milliGRAM(s) Oral two times a day  sodium chloride 3%  Inhalation 4 milliLiter(s) Inhalation every 6 hours  sucralfate 1 Gram(s) Oral every 12 hours  ticagrelor 60 milliGRAM(s) Oral every 12 hours    MEDICATIONS  (PRN):  acetaminophen     Tablet .. 650 milliGRAM(s) Oral every 6 hours PRN Temp greater or equal to 38C (100.4F), Mild Pain (1 - 3), Moderate Pain (4 - 6)  guaiFENesin Oral Liquid (Sugar-Free) 100 milliGRAM(s) Oral every 6 hours PRN Cough    Vital Signs Last 24 Hrs  T(C): 36.4 (18 Jan 2024 10:10), Max: 36.8 (17 Jan 2024 16:58)  T(F): 97.6 (18 Jan 2024 10:10), Max: 98.2 (17 Jan 2024 16:58)  HR: 75 (18 Jan 2024 10:10) (72 - 82)  BP: 100/38 (18 Jan 2024 10:10) (100/38 - 144/53)  BP(mean): --  RR: 18 (18 Jan 2024 10:10) (17 - 18)  SpO2: 98% (18 Jan 2024 10:10) (94% - 98%)    Parameters below as of 18 Jan 2024 10:10  Patient On (Oxygen Delivery Method): nasal cannula        I&O's Summary    17 Jan 2024 07:01  -  18 Jan 2024 07:00  --------------------------------------------------------  IN: 120 mL / OUT: 500 mL / NET: -380 mL    18 Jan 2024 07:01  -  18 Jan 2024 12:21  --------------------------------------------------------  IN: 240 mL / OUT: 0 mL / NET: 240 mL          Physical Exam:   GENERAL: NAD, well-groomed, well-developed  HEENT: JAVAD/   Atraumatic, Normocephalic  ENMT: No tonsillar erythema, exudates, or enlargement; Moist mucous membranes, Good dentition, No lesions  NECK: Supple, No JVD, Normal thyroid  CHEST/LUNG: decreased air entry ' [id number]+9   CVS: Regular rate and rhythm; No murmurs, rubs, or gallops  GI: : Soft, Nontender, Nondistended; Bowel sounds present  NERVOUS SYSTEM:  Alert & Oriented X3  EXTREMITIES:  - edema  LYMPH: No lymphadenopathy noted  SKIN: No rashes or lesions  ENDOCRINOLOGY: No Thyromegaly  PSYCH: calm     Labs:  37                            8.9    7.40  )-----------( 277      ( 18 Jan 2024 06:17 )             27.9                         9.0    7.43  )-----------( 254      ( 17 Jan 2024 06:17 )             28.5                         9.6    7.10  )-----------( 298      ( 16 Jan 2024 06:28 )             30.6     01-18    139  |  101  |  15  ----------------------------<  105<H>  3.5   |  29  |  1.55<H>  01-17    141  |  101  |  16  ----------------------------<  103<H>  3.3<L>   |  31  |  1.57<H>  01-16    139  |  98  |  16  ----------------------------<  147<H>  3.3<L>   |  31  |  1.58<H>    Ca    8.3<L>      18 Jan 2024 06:17  Ca    8.3<L>      17 Jan 2024 06:17  Phos  2.3     01-18  Phos  2.2     01-17  Mg     1.90     01-18  Mg     1.90     01-17      CAPILLARY BLOOD GLUCOSE      POCT Blood Glucose.: 116 mg/dL (18 Jan 2024 08:59)  POCT Blood Glucose.: 179 mg/dL (17 Jan 2024 21:00)  POCT Blood Glucose.: 191 mg/dL (17 Jan 2024 18:06)  POCT Blood Glucose.: 146 mg/dL (17 Jan 2024 12:46)          Urinalysis Basic - ( 18 Jan 2024 06:17 )    Color: x / Appearance: x / SG: x / pH: x  Gluc: 105 mg/dL / Ketone: x  / Bili: x / Urobili: x   Blood: x / Protein: x / Nitrite: x   Leuk Esterase: x / RBC: x / WBC x   Sq Epi: x / Non Sq Epi: x / Bacteria: x      rad< from: CT Chest No Cont (01.16.24 @ 18:04) >  PROCEDURE:  CT of the Chest was performed.  Sagittal and coronal reformats were performed.    FINDINGS:    LUNGS/AIRWAYS/PLEURA: Layering secretions in the trachea, left mainstem   bronchus, and right lower lobe bronchus. New small bilateral pleural   effusions with associated complete collapse of the right middle and right   lower lobes and partial atelectasis of the right upper and left lower   lobes. Patchy groundglass opacities in the bilateral aerated lobes. Lung   nodules difficult to visualize due to pulmonary disease.  MEDIASTINUM AND GENA: No lymphadenopathy.  VESSELS: Aortic and coronary artery calcification.  HEART: Heart size is enlarged. No pericardial effusion.  CHEST WALL AND LOWER NECK: Left chest wall pacemaker with leads in the   right atrium and right ventricle. Status post CABG.  VISUALIZED UPPER ABDOMEN: Clips within the stomach, likely from previous   procedure.  BONES: Degenerative changes. Healed sternotomy.    IMPRESSION:  New small bilateral pleural effusions with associated complete collapse   of the right middle and right lower lobes and partial atelectasis of the   right upper and left lower lobes.    Patchy bilateral groundglass opacities are consistent with pulmonary   edema.    Layering secretions in the trachea, left mainstem bronchus, and right   lower lobe bronchus.    --- End of Report ---          STEFFANY PATEL MD; Resident Radiologist  This document has been electronically signed.  URIEL MOTLEY DO; Attending Radiologist  Thisdocument has been electronically signed. Jan 17 2024 12:13PM    < end of copied text >        RECENT CULTURES:        RESPIRATORY CULTURES:          Studies  Chest X-RAY  CT SCAN Chest   Venous Dopplers: LE:   CT Abdomen  Others

## 2024-01-18 NOTE — PROGRESS NOTE ADULT - ASSESSMENT
on distress   on  puree  diet       ROS ; no sob   acetaminophen   IVPB .. 1000 milliGRAM(s) IV Intermittent every 6 hours PRN  albuterol/ipratropium for Nebulization 3 milliLiter(s) Nebulizer every 12 hours  amLODIPine   Tablet 5 milliGRAM(s) Oral daily  aspirin  chewable 81 milliGRAM(s) Oral daily  dornase cierra Solution 2.5 milliGRAM(s) Inhalation every 12 hours  escitalopram 10 milliGRAM(s) Oral daily  gabapentin Solution 100 milliGRAM(s) Oral two times a day  heparin   Injectable 5000 Unit(s) SubCutaneous every 8 hours  insulin glargine Injectable (LANTUS) 14 Unit(s) SubCutaneous at bedtime  insulin lispro (ADMELOG) corrective regimen sliding scale   SubCutaneous every 6 hours  levETIRAcetam   Injectable 250 milliGRAM(s) IV Push every 12 hours  melatonin 3 milliGRAM(s) Oral at bedtime  memantine 5 milliGRAM(s) Oral two times a day  metoprolol tartrate Injectable 5 milliGRAM(s) IV Push every 6 hours  mometasone 110 MICROgram(s) Inhaler 2 Puff(s) Inhalation every 12 hours  pantoprazole  Injectable 40 milliGRAM(s) IV Push every 12 hours  ranolazine 500 milliGRAM(s) Oral two times a day  sodium chloride 0.45% with potassium chloride 20 mEq/L 1000 milliLiter(s) IV Continuous <Continuous>  sodium phosphate 30 milliMole(s)/500 mL IVPB 30 milliMole(s) IV Intermittent once  sucralfate 1 Gram(s) Oral every 12 hours  ticagrelor 60 milliGRAM(s) Enteral Tube every 12 hours      VITAL:  T(C): , Max: 37 (01-06-24 @ 21:06)  T(F): , Max: 98.6 (01-06-24 @ 21:06)  HR: 86 (01-07-24 @ 05:45)  BP: 180/82 (01-07-24 @ 05:45)  BP(mean): --  RR: 19 (01-07-24 @ 05:45)  SpO2: 95% (01-07-24 @ 05:45)  Wt(kg): --    01-06-24 @ 07:01  -  01-07-24 @ 07:00  --------------------------------------------------------  IN: 185 mL / OUT: 0 mL / NET: 185 mL        PHYSICAL EXAM:  General: NAD; Alert  HEENT:  NCAT; PERRLA, +NGT  Neck: No JVD; supple  Respiratory: b/l scattered rales   Cardiac: RRR s1s2  Gastrointestinal: BS+, soft, NT/ND  Urologic: No delong  Extremities: No peripheral edema  Access:     LABS:                          9.2    10.00 )-----------( 312      ( 07 Jan 2024 06:00 )             28.6     Na(137)/K(5.2)/Cl(109)/HCO3(19)/BUN(21)/Cr(1.35)Glu(301)/Ca(7.8)/Mg(2.20)/PO4(2.2)    01-07 @ 06:00  Na(143)/K(5.0)/Cl(111)/HCO3(20)/BUN(20)/Cr(1.49)Glu(195)/Ca(8.2)/Mg(1.90)/PO4(2.7)    01-06 @ 07:35  Na(142)/K(5.1)/Cl(111)/HCO3(19)/BUN(22)/Cr(1.45)Glu(219)/Ca(8.0)/Mg(1.90)/PO4(3.2)    01-05 @ 22:19  Na(140)/K(5.0)/Cl(111)/HCO3(16)/BUN(24)/Cr(1.41)Glu(199)/Ca(8.1)/Mg(1.90)/PO4(3.3)    01-05 @ 01:00    Urinalysis Basic - ( 07 Jan 2024 06:00 )    Color: x / Appearance: x / SG: x / pH: x  Gluc: 301 mg/dL / Ketone: x  / Bili: x / Urobili: x   Blood: x / Protein: x / Nitrite: x   Leuk Esterase: x / RBC: x / WBC x   Sq Epi: x / Non Sq Epi: x / Bacteria: x    IMAGES:    PROCEDURE:  CT of the Chest was performed.  Sagittal and coronal reformats were performed.    FINDINGS:    LUNGS/AIRWAYS/PLEURA: Layering secretions in the trachea, left mainstem   bronchus, and right lower lobe bronchus. New small bilateral pleural   effusions with associated complete collapse of the right middle and right   lower lobes and partial atelectasis of the right upper and left lower   lobes. Patchy groundglass opacities in the bilateral aerated lobes. Lung   nodules difficult to visualize due to pulmonary disease.  MEDIASTINUM AND GENA: No lymphadenopathy.  VESSELS: Aortic and coronary artery calcification.  HEART: Heart size is enlarged. No pericardial effusion.  CHEST WALL AND LOWER NECK: Left chest wall pacemaker with leads in the   right atrium and right ventricle. Status post CABG.  VISUALIZED UPPER ABDOMEN: Clips within the stomach, likely from previous   procedure.  BONES: Degenerative changes. Healed sternotomy.    IMPRESSION:  New small bilateral pleural effusions with associated complete collapse   of the right middle and right lower lobes and partial atelectasis of the   right upper and left lower lobes.    Patchy bilateral groundglass opacities are consistent with pulmonary   edema.    Layering secretions in the trachea, left mainstem bronchus, and right   lower lobe bronchus.        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 schedule bladder scan. a-w lasix 20 mg daily   2 hypokalemia-  a-w repletion   3 Hypophosphatemia -repleted , please give an extra dose too   ( might be refeeding)  4 CV - BP soft stable , on Lopressor 25 mg bid , and amlodipine 5 mg daily ( with parameters)  5 Vasc - s/p SMA stent placement;   6 Sx - s/p HIDA + for acute asa, medical management, on puree diet , encourage free water in take too (if allowed  with aspiration precautions)   7 GI - s/p EGD clipping of bleeding duodenal ulcers   8 Anemia- hgb stable   9Pul-chest PT         d-w all Nunez  [facility]  Office: [phone]     on distress   on  puree  diet       ROS ; no sob   acetaminophen   IVPB .. 1000 milliGRAM(s) IV Intermittent every 6 hours PRN  albuterol/ipratropium for Nebulization 3 milliLiter(s) Nebulizer every 12 hours  amLODIPine   Tablet 5 milliGRAM(s) Oral daily  aspirin  chewable 81 milliGRAM(s) Oral daily  dornase cierra Solution 2.5 milliGRAM(s) Inhalation every 12 hours  escitalopram 10 milliGRAM(s) Oral daily  gabapentin Solution 100 milliGRAM(s) Oral two times a day  heparin   Injectable 5000 Unit(s) SubCutaneous every 8 hours  insulin glargine Injectable (LANTUS) 14 Unit(s) SubCutaneous at bedtime  insulin lispro (ADMELOG) corrective regimen sliding scale   SubCutaneous every 6 hours  levETIRAcetam   Injectable 250 milliGRAM(s) IV Push every 12 hours  melatonin 3 milliGRAM(s) Oral at bedtime  memantine 5 milliGRAM(s) Oral two times a day  metoprolol tartrate Injectable 5 milliGRAM(s) IV Push every 6 hours  mometasone 110 MICROgram(s) Inhaler 2 Puff(s) Inhalation every 12 hours  pantoprazole  Injectable 40 milliGRAM(s) IV Push every 12 hours  ranolazine 500 milliGRAM(s) Oral two times a day  sodium chloride 0.45% with potassium chloride 20 mEq/L 1000 milliLiter(s) IV Continuous <Continuous>  sodium phosphate 30 milliMole(s)/500 mL IVPB 30 milliMole(s) IV Intermittent once  sucralfate 1 Gram(s) Oral every 12 hours  ticagrelor 60 milliGRAM(s) Enteral Tube every 12 hours      VITAL:  T(C): , Max: 37 (01-06-24 @ 21:06)  T(F): , Max: 98.6 (01-06-24 @ 21:06)  HR: 86 (01-07-24 @ 05:45)  BP: 180/82 (01-07-24 @ 05:45)  BP(mean): --  RR: 19 (01-07-24 @ 05:45)  SpO2: 95% (01-07-24 @ 05:45)  Wt(kg): --    01-06-24 @ 07:01  -  01-07-24 @ 07:00  --------------------------------------------------------  IN: 185 mL / OUT: 0 mL / NET: 185 mL        PHYSICAL EXAM:  General: NAD; Alert  HEENT:  NCAT; PERRLA, +NGT  Neck: No JVD; supple  Respiratory: b/l scattered rales   Cardiac: RRR s1s2  Gastrointestinal: BS+, soft, NT/ND  Urologic: No delong  Extremities: No peripheral edema  Access:     LABS:                          9.2    10.00 )-----------( 312      ( 07 Jan 2024 06:00 )             28.6     Na(137)/K(5.2)/Cl(109)/HCO3(19)/BUN(21)/Cr(1.35)Glu(301)/Ca(7.8)/Mg(2.20)/PO4(2.2)    01-07 @ 06:00  Na(143)/K(5.0)/Cl(111)/HCO3(20)/BUN(20)/Cr(1.49)Glu(195)/Ca(8.2)/Mg(1.90)/PO4(2.7)    01-06 @ 07:35  Na(142)/K(5.1)/Cl(111)/HCO3(19)/BUN(22)/Cr(1.45)Glu(219)/Ca(8.0)/Mg(1.90)/PO4(3.2)    01-05 @ 22:19  Na(140)/K(5.0)/Cl(111)/HCO3(16)/BUN(24)/Cr(1.41)Glu(199)/Ca(8.1)/Mg(1.90)/PO4(3.3)    01-05 @ 01:00    Urinalysis Basic - ( 07 Jan 2024 06:00 )    Color: x / Appearance: x / SG: x / pH: x  Gluc: 301 mg/dL / Ketone: x  / Bili: x / Urobili: x   Blood: x / Protein: x / Nitrite: x   Leuk Esterase: x / RBC: x / WBC x   Sq Epi: x / Non Sq Epi: x / Bacteria: x    IMAGES:    PROCEDURE:  CT of the Chest was performed.  Sagittal and coronal reformats were performed.    FINDINGS:    LUNGS/AIRWAYS/PLEURA: Layering secretions in the trachea, left mainstem   bronchus, and right lower lobe bronchus. New small bilateral pleural   effusions with associated complete collapse of the right middle and right   lower lobes and partial atelectasis of the right upper and left lower   lobes. Patchy groundglass opacities in the bilateral aerated lobes. Lung   nodules difficult to visualize due to pulmonary disease.  MEDIASTINUM AND GENA: No lymphadenopathy.  VESSELS: Aortic and coronary artery calcification.  HEART: Heart size is enlarged. No pericardial effusion.  CHEST WALL AND LOWER NECK: Left chest wall pacemaker with leads in the   right atrium and right ventricle. Status post CABG.  VISUALIZED UPPER ABDOMEN: Clips within the stomach, likely from previous   procedure.  BONES: Degenerative changes. Healed sternotomy.    IMPRESSION:  New small bilateral pleural effusions with associated complete collapse   of the right middle and right lower lobes and partial atelectasis of the   right upper and left lower lobes.    Patchy bilateral groundglass opacities are consistent with pulmonary   edema.    Layering secretions in the trachea, left mainstem bronchus, and right   lower lobe bronchus.        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 schedule bladder scan. a-w lasix 20 mg daily , schedule bladder scan x8h   2 hypokalemia-  a-w repletion   3 Hypophosphatemia -repleted , please give an extra dose too   ( might be refeeding)  4 CV - BP soft stable , on Lopressor 25 mg bid , and amlodipine 5 mg daily ( with parameters)  5 Vasc - s/p SMA stent placement;   6 Sx - s/p HIDA + for acute asa, medical management, on puree diet , encourage free water in take too (if allowed  with aspiration precautions)   7 GI - s/p EGD clipping of bleeding duodenal ulcers   8 Anemia- hgb stable   9Pul-chest PT         d-w all   Elsa Nunez  [facility]  Office: [phone]

## 2024-01-18 NOTE — SWALLOW BEDSIDE ASSESSMENT ADULT - COMMENTS
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 CTA demonstrating mesenteric fat stranding associated with ascending/transverse colon. S/p SMA angiography with stent placement with diagnostic laparoscopy 12/24, small bowel and visible colon viable, some inflammation of omentum in RUQ. Course c/b GI bleed, s/p scope on 1/2 with GI with clips placed. Patient transferred overnight from SICU to the floor in clinically stable condition."    CT Chest 1/16: "IMPRESSION: New small bilateral pleural effusions with associated complete collapse of the right middle and right lower lobes and partial atelectasis of the right upper and left lower lobes. Patchy bilateral groundglass opacities are consistent with pulmonary edema. Layering secretions in the trachea, left mainstem bronchus, and right lower lobe bronchus."    Of note, patient seen for swallow evaluations on 1/3 and 1/7, and a Cinesophagram on 1/8 with recommendations of Puree with Moderately Thick Liquids. (see report details for details)     Patient seen awake/alert during clinical swallow reevaluation this PM with patient's son (Neurologist) present at bedside. Patient is Romansh and English speaking. Patient's son reports that the patient is tolerating current diet. Patient receiving EEG upon arrival. Patient is noted with baseline cough.

## 2024-01-18 NOTE — SWALLOW BEDSIDE ASSESSMENT ADULT - SWALLOW EVAL: DIAGNOSIS
1- Functional oral stage for puree and thin liquids marked by adequate oral containment, adequate bolus manipulation, adequate anterior to posterior transfer, adequate oral clearance. 2- Moderate/Severe pharyngeal stage marked by puree and thin liquids marked by suspected delayed initiation of the pharyngeal swallow with hyolaryngeal excursion upon palpation with wet vocal quality and throat clear post oral intake of thin liquid suggestive of impaired airway protection. Of note, patient reports stuck sensation while pointing to throat with decline/refusal to continue PO trials by turning head away and waving away utensil/cup presentations.
1- Functional oral stage for puree, soft/bite solids, and thin liquids marked by adequate oral containment, adequate bolus manipulation, adequate anterior to posterior transfer, trace oral residue which clears with liquid wash. 2- Functional pharyngeal stage for puree, soft/bite solids, and thin liquids marked by initiation of the pharyngeal swallow with hyolaryngeal excurison upon palpation without evidence of impaired airway protection.
Received clearance for thin liquid trials only per SICU team. 1. Functional oral stage for thin liquids marked by adequate oral acceptance, collection and transfer. 2. Functional pharyngeal phase for thin liquids marked by a present pharyngeal swallow trigger with hyolaryngeal elevation noted upon digital palpation without evidence of impaired airway protection. Of Note patient accepted ~30cc and declining further trials by stating "no" and shaking head "no" despite SLP/family encouragement.

## 2024-01-18 NOTE — SWALLOW BEDSIDE ASSESSMENT ADULT - SLP GENERAL OBSERVATIONS
Patient is awake/alert and cooperative given encouragement from son.
Patient is awake/alert and receptive to PO trials with encouragement.

## 2024-01-18 NOTE — CONSULT NOTE ADULT - PROBLEM SELECTOR RECOMMENDATION 9
1. Patient asymptomatic. No signs of mastoiditis. CT reviewed. No ENT intervention at this time. will Discuss with ENT attending and sign off.

## 2024-01-18 NOTE — SWALLOW BEDSIDE ASSESSMENT ADULT - SWALLOW EVAL: RECOMMENDED FEEDING/EATING TECHNIQUES
Swallowing Guidelines: Seated Upright during Mealtimes; Slow Pacing; Single/Small Sips; Allow for Swallow Prior to Next Presentation; Maintain Adequate Oral Hygiene

## 2024-01-18 NOTE — SWALLOW BEDSIDE ASSESSMENT ADULT - SWALLOW EVAL: CURRENT DIET
NPO
Clear liquids, per MD order
Puree with Moderately Thick Liquids
NPO with Nasogastric Tube Feeding

## 2024-01-18 NOTE — EEG REPORT - NS EEG TEXT BOX
REPORT OF ROUTINE EEG WITH VIDEO    [facility]: [address], [address] [place] [address], Phone: [phone]  [facility]: [id number] [place] [address], Phone: [phone]  Office: [address] [place] [address], [place] [address], Phone: [phone]    Patient Name: SHAIK DONOHUE    Age: 89 year  : 10/5/1933  Referring Physician: MARTINEZ UGARTE    EEG #: [id number]  Study Date: 2024   Start Time: 10:36:20 AM     Study Duration: 248.2  		  Technical Information:					  On Instrument: [id number]  Placement and Labeling of Electrodes:  The EEG was performed utilizing 20 channels referential EEG connections (coronal over temporal over parasagittal montage) using all standard 10-20 electrode placements with EKG.  Recording was at a sampling rate of 256 samples per second per channel.  Time synchronized digital video recording was done simultaneously with EEG recording.  A low light infrared camera was used for low light recording.  Jignesh and seizure detection algorithms were utilized.  CSA Technical Component:  Quantitative EEG analysis using a separate Compressed Spectral Array (CSA) software package was conducted in real-time and run at bedside after set up by the technician, digitally displaying the power of electrographic frequencies included in the 1-30Hz band using a graded color map.  This data was reviewed and interpreted independently, and is reported in a separate section below.    History:  Not listed    Medication  HEPARIN  ASPIRIN  MEMANTINE  LEVETIRACETAM  GABAPENTIN  ESCITALOPRAM  MOMETASONE  RANOLAZINE  METOPROLOL  AMLODIPINE    Study Interpretation:    FINDINGS:  The background was continuous, spontaneously variable and reactive.  During wakefulness, the posteriorly dominant rhythm consisted of symmetric, well modulated x Hz activity, with an amplitude to 30 uV, that attenuated to eye opening.  Low amplitude central beta was noted in wakefulness.    Background Slowing:  Generalized slowing: predominantly theta diffusely  Focal slowing: none was present.    Sleep Background:  -Drowsiness was characterized by fragmentation, attenuation, and slowing of the background activity.    -Stage II sleep transients were not recorded.    Non-epileptiform activity:  There is intermittent rhythmical delta activity.     Epileptiform Activity:   No epileptiform discharges were present.    Events:  No clinical events were recorded.  No seizures were recorded.    Activation Procedures:   -Hyperventilation was not performed.    -Photic stimulation was not performed.    Artifacts:  Intermittent myogenic and movement artifacts were noted.    ECG:  The heart rate on single channel ECG was predominantly between 70-80 BPM.    EEG Classification / Summary:  Abnormal EEG in the awake, drowsy and asleep states.  1.	Background slowing, generalized, mild  2.	Intermittent rhythmical delta activity, frontally maximal    Clinical Impression:  Mild-moderate nonspecific diffuse or multifocal cerebral dysfunction.   No epileptiform pattern or seizure recorded.  ________________________________________    Rafy Kulkarni MD, PhD  Director, Epilepsy Division, [facility]    ------------------------------------  EEG Reading Room: [phone]  On Call Service After Hours: [phone]

## 2024-01-18 NOTE — CONSULT NOTE ADULT - SUBJECTIVE AND OBJECTIVE BOX
CC: rule out mastoiditis     HPI: 89 year old male admitted to [facility] for with covid and chest pain. Patient S/P SMA stent. Had GIB post operatively. Now with AMS, getting Neuro work up. Found to have opacification of Left mastoid on CT. Denies any ear pain, drainage, fevers, chills, N/V, rigors. Denies tinnitus, vertigo. Denies visual disturbances.       PAST MEDICAL & SURGICAL HISTORY:  Hyperlipemia      Hypertension      Coronary Artery Disease      Diabetes Mellitus Type II      Stented Coronary Artery  total 5 stents, last stent 5/2019      Neuropathy      Myocardial infarction      Stroke  mild left facial numbness   no other residuals verbalized      Myoclonic jerking      Stage 3 chronic kidney disease      History of Cataract Extraction      Hx of CABG      H/O coronary angiogram      S/P coronary artery stent placement  1/6/09      S/P placement of cardiac pacemaker        Allergies    fluoroquinolone antibiotics (Other)  Cipro (Unknown)  Tegretol (Unknown)  carbamazepine (Other)    Intolerances      MEDICATIONS  (STANDING):  albuterol/ipratropium for Nebulization 3 milliLiter(s) Nebulizer every 6 hours  amLODIPine   Tablet 5 milliGRAM(s) Oral daily  aspirin  chewable 81 milliGRAM(s) Oral daily  escitalopram 10 milliGRAM(s) Oral daily  furosemide    Tablet 20 milliGRAM(s) Oral daily  gabapentin Solution 100 milliGRAM(s) Oral two times a day  heparin   Injectable 5000 Unit(s) SubCutaneous every 8 hours  insulin glargine Injectable (LANTUS) 14 Unit(s) SubCutaneous at bedtime  insulin lispro (ADMELOG) corrective regimen sliding scale   SubCutaneous at bedtime  insulin lispro (ADMELOG) corrective regimen sliding scale   SubCutaneous three times a day before meals  levETIRAcetam   Injectable 250 milliGRAM(s) IV Push every 12 hours  melatonin 3 milliGRAM(s) Oral at bedtime  memantine 5 milliGRAM(s) Oral two times a day  metoprolol tartrate 25 milliGRAM(s) Oral two times a day  mometasone 110 MICROgram(s) Inhaler 2 Puff(s) Inhalation every 12 hours  pantoprazole  Injectable 40 milliGRAM(s) IV Push every 12 hours  potassium phosphate / sodium phosphate Powder (PHOS-NaK) 1 Packet(s) Oral once  ranolazine 500 milliGRAM(s) Oral two times a day  sodium chloride 3%  Inhalation 4 milliLiter(s) Inhalation every 6 hours  sucralfate 1 Gram(s) Oral every 12 hours  ticagrelor 60 milliGRAM(s) Oral every 12 hours    MEDICATIONS  (PRN):  acetaminophen     Tablet .. 650 milliGRAM(s) Oral every 6 hours PRN Temp greater or equal to 38C (100.4F), Mild Pain (1 - 3), Moderate Pain (4 - 6)  guaiFENesin Oral Liquid (Sugar-Free) 100 milliGRAM(s) Oral every 6 hours PRN Cough        ROS:   ENT: all negative except as noted in HPI   CV: denies palpitations  Pulm: denies SOB, cough, hemoptysis  GI: denies change in apetite, indigestion, n/v  : denies pertinent urinary symptoms, urgency  Neuro: denies numbness/tingling, loss of sensation  Psych: denies anxiety  MS: denies muscle weakness, instability  Heme: denies easy bruising or bleeding  Endo: denies heat/cold intolerance, excessive sweating  Vascular: denies LE edema    Vital Signs Last 24 Hrs  T(C): 36.4 (18 Jan 2024 10:10), Max: 36.8 (17 Jan 2024 16:58)  T(F): 97.6 (18 Jan 2024 10:10), Max: 98.2 (17 Jan 2024 16:58)  HR: 75 (18 Jan 2024 10:10) (72 - 82)  BP: 100/38 (18 Jan 2024 10:10) (100/38 - 144/53)  BP(mean): --  RR: 18 (18 Jan 2024 10:10) (17 - 18)  SpO2: 98% (18 Jan 2024 10:10) (94% - 98%)    Parameters below as of 18 Jan 2024 10:10  Patient On (Oxygen Delivery Method): nasal cannula                              8.9    7.40  )-----------( 277      ( 18 Jan 2024 06:17 )             27.9    01-18    139  |  101  |  15  ----------------------------<  105<H>  3.5   |  29  |  1.55<H>    Ca    8.3<L>      18 Jan 2024 06:17  Phos  2.3     01-18  Mg     1.90     01-18         PHYSICAL EXAM:  Gen: NAD  Skin: No rashes, bruises, or lesions  Head: Normocephalic, Atraumatic  Face: no edema, erythema, or fluctuance. Parotid glands soft without mass  Eyes: no scleral injection  Ears: Right - ear canal clear, TM intact without effusion or erythema. No evidence of any fluid drainage. No mastoid tenderness, erythema, or ear bulging            Left - ear canal clear, TM intact without effusion or erythema. No evidence of any fluid drainage. No mastoid tenderness, erythema, or ear bulging  Nose: Nares bilaterally patent, no discharge  Mouth: No Stridor / Drooling / Trismus.  Mucosa moist, tongue/uvula midline, oropharynx clear  Neck: Flat, supple, no lymphadenopathy, trachea midline, no masses  Lymphatic: No lymphadenopathy  Resp: breathing easily, no stridor  CV: no peripheral edema/cyanosis  GI: nondistended   Peripheral vascular: no JVD or edema  Neuro: facial nerve intact, no facial droop      IMAGING/ADDITIONAL STUDIES:     IMPRESSION:    No evidence of acute intracranial hemorrhage, midline shift or CT evidence of acute territorial infarct.  Partial opacification of the left middle ear and mastoid air cells. Correlate clinically for otomastoiditis.  If the patient's symptoms persist, consider short interval follow-up head CT or brain MRI if there are no MRI contraindications.

## 2024-01-18 NOTE — SWALLOW BEDSIDE ASSESSMENT ADULT - ASR SWALLOW RECOMMEND DIAG
Objective testing NOT warranted given no overt signs of impaired airway protection.
Cinesophagram given patient report of stuck sensation during PO intake/VFSS/MBS
Repeat Cinesophagram to rule out if baseline cough and CT Chest findings are dysphagia/aspiration related/VFSS/MBS

## 2024-01-18 NOTE — CHART NOTE - NSCHARTNOTEFT_GEN_A_CORE
EEG preliminary read (not final) on the initial recording hour(s) = x 2    No seizures recorded.  Moderate slowing noted, nonspecific.  Final report to follow tomorrow morning after completion of study.    [facility] EEG Reading Room Ph#: [phone]  Epilepsy Answering Service after 5PM and before 8:30AM: Ph#: [phone]

## 2024-01-19 LAB
ANION GAP SERPL CALC-SCNC: 12 MMOL/L — SIGNIFICANT CHANGE UP (ref 7–14)
APTT BLD: 34 SEC — SIGNIFICANT CHANGE UP (ref 24.5–35.6)
BLD GP AB SCN SERPL QL: NEGATIVE — SIGNIFICANT CHANGE UP
BLOOD GAS ARTERIAL - LYTES,HGB,ICA,LACT RESULT: SIGNIFICANT CHANGE UP
BLOOD GAS ARTERIAL COMPREHENSIVE RESULT: SIGNIFICANT CHANGE UP
BUN SERPL-MCNC: 14 MG/DL — SIGNIFICANT CHANGE UP (ref 7–23)
CALCIUM SERPL-MCNC: 8.4 MG/DL — SIGNIFICANT CHANGE UP (ref 8.4–10.5)
CHLORIDE SERPL-SCNC: 101 MMOL/L — SIGNIFICANT CHANGE UP (ref 98–107)
CO2 SERPL-SCNC: 29 MMOL/L — SIGNIFICANT CHANGE UP (ref 22–31)
CREAT SERPL-MCNC: 1.54 MG/DL — HIGH (ref 0.5–1.3)
EGFR: 43 ML/MIN/1.73M2 — LOW
GLUCOSE BLDC GLUCOMTR-MCNC: 103 MG/DL — HIGH (ref 70–99)
GLUCOSE BLDC GLUCOMTR-MCNC: 105 MG/DL — HIGH (ref 70–99)
GLUCOSE BLDC GLUCOMTR-MCNC: 129 MG/DL — HIGH (ref 70–99)
GLUCOSE BLDC GLUCOMTR-MCNC: 130 MG/DL — HIGH (ref 70–99)
GLUCOSE BLDC GLUCOMTR-MCNC: 138 MG/DL — HIGH (ref 70–99)
GLUCOSE BLDC GLUCOMTR-MCNC: 55 MG/DL — LOW (ref 70–99)
GLUCOSE BLDC GLUCOMTR-MCNC: 66 MG/DL — LOW (ref 70–99)
GLUCOSE BLDC GLUCOMTR-MCNC: 67 MG/DL — LOW (ref 70–99)
GLUCOSE BLDC GLUCOMTR-MCNC: 69 MG/DL — LOW (ref 70–99)
GLUCOSE BLDC GLUCOMTR-MCNC: 71 MG/DL — SIGNIFICANT CHANGE UP (ref 70–99)
GLUCOSE SERPL-MCNC: 60 MG/DL — LOW (ref 70–99)
GRAM STN FLD: ABNORMAL
HCT VFR BLD CALC: 27.7 % — LOW (ref 39–50)
HGB BLD-MCNC: 8.9 G/DL — LOW (ref 13–17)
INR BLD: 1.12 RATIO — SIGNIFICANT CHANGE UP (ref 0.85–1.18)
MAGNESIUM SERPL-MCNC: 1.8 MG/DL — SIGNIFICANT CHANGE UP (ref 1.6–2.6)
MCHC RBC-ENTMCNC: 30.6 PG — SIGNIFICANT CHANGE UP (ref 27–34)
MCHC RBC-ENTMCNC: 32.1 GM/DL — SIGNIFICANT CHANGE UP (ref 32–36)
MCV RBC AUTO: 95.2 FL — SIGNIFICANT CHANGE UP (ref 80–100)
MRSA PCR RESULT.: SIGNIFICANT CHANGE UP
NRBC # BLD: 0 /100 WBCS — SIGNIFICANT CHANGE UP (ref 0–0)
NRBC # FLD: 0 K/UL — SIGNIFICANT CHANGE UP (ref 0–0)
PHOSPHATE SERPL-MCNC: 3 MG/DL — SIGNIFICANT CHANGE UP (ref 2.5–4.5)
PLATELET # BLD AUTO: 284 K/UL — SIGNIFICANT CHANGE UP (ref 150–400)
POTASSIUM SERPL-MCNC: 3.9 MMOL/L — SIGNIFICANT CHANGE UP (ref 3.5–5.3)
POTASSIUM SERPL-SCNC: 3.9 MMOL/L — SIGNIFICANT CHANGE UP (ref 3.5–5.3)
PROTHROM AB SERPL-ACNC: 12.6 SEC — SIGNIFICANT CHANGE UP (ref 9.5–13)
RBC # BLD: 2.91 M/UL — LOW (ref 4.2–5.8)
RBC # FLD: 17.2 % — HIGH (ref 10.3–14.5)
RH IG SCN BLD-IMP: POSITIVE — SIGNIFICANT CHANGE UP
S AUREUS DNA NOSE QL NAA+PROBE: DETECTED
SODIUM SERPL-SCNC: 142 MMOL/L — SIGNIFICANT CHANGE UP (ref 135–145)
SPECIMEN SOURCE: SIGNIFICANT CHANGE UP
WBC # BLD: 8.25 K/UL — SIGNIFICANT CHANGE UP (ref 3.8–10.5)
WBC # FLD AUTO: 8.25 K/UL — SIGNIFICANT CHANGE UP (ref 3.8–10.5)

## 2024-01-19 PROCEDURE — 88305 TISSUE EXAM BY PATHOLOGIST: CPT | Mod: 26

## 2024-01-19 PROCEDURE — 71045 X-RAY EXAM CHEST 1 VIEW: CPT | Mod: 26

## 2024-01-19 PROCEDURE — 31645 BRNCHSC W/THER ASPIR 1ST: CPT | Mod: GC

## 2024-01-19 PROCEDURE — 88112 CYTOPATH CELL ENHANCE TECH: CPT | Mod: 26

## 2024-01-19 PROCEDURE — 99223 1ST HOSP IP/OBS HIGH 75: CPT | Mod: GC,25

## 2024-01-19 PROCEDURE — ZZZZZ: CPT

## 2024-01-19 PROCEDURE — 31624 DX BRONCHOSCOPE/LAVAGE: CPT | Mod: GC

## 2024-01-19 PROCEDURE — 74230 X-RAY XM SWLNG FUNCJ C+: CPT | Mod: 26

## 2024-01-19 RX ORDER — OXYCODONE HYDROCHLORIDE 5 MG/1
5 TABLET ORAL ONCE
Refills: 0 | Status: DISCONTINUED | OUTPATIENT
Start: 2024-01-19 | End: 2024-01-19

## 2024-01-19 RX ORDER — INSULIN HUMAN 100 [IU]/ML
INJECTION, SOLUTION SUBCUTANEOUS EVERY 6 HOURS
Refills: 0 | Status: DISCONTINUED | OUTPATIENT
Start: 2024-01-19 | End: 2024-01-19

## 2024-01-19 RX ORDER — FENTANYL CITRATE 50 UG/ML
50 INJECTION INTRAVENOUS
Refills: 0 | Status: DISCONTINUED | OUTPATIENT
Start: 2024-01-19 | End: 2024-01-19

## 2024-01-19 RX ORDER — CEFEPIME 1 G/1
1000 INJECTION, POWDER, FOR SOLUTION INTRAMUSCULAR; INTRAVENOUS ONCE
Refills: 0 | Status: COMPLETED | OUTPATIENT
Start: 2024-01-19 | End: 2024-01-19

## 2024-01-19 RX ORDER — ACETYLCYSTEINE 200 MG/ML
4 VIAL (ML) MISCELLANEOUS EVERY 6 HOURS
Refills: 0 | Status: DISCONTINUED | OUTPATIENT
Start: 2024-01-19 | End: 2024-01-20

## 2024-01-19 RX ORDER — DILTIAZEM HCL 120 MG
10 CAPSULE, EXT RELEASE 24 HR ORAL ONCE
Refills: 0 | Status: DISCONTINUED | OUTPATIENT
Start: 2024-01-19 | End: 2024-01-19

## 2024-01-19 RX ORDER — IPRATROPIUM/ALBUTEROL SULFATE 18-103MCG
3 AEROSOL WITH ADAPTER (GRAM) INHALATION ONCE
Refills: 0 | Status: COMPLETED | OUTPATIENT
Start: 2024-01-19 | End: 2024-01-19

## 2024-01-19 RX ORDER — SODIUM CHLORIDE 9 MG/ML
1000 INJECTION, SOLUTION INTRAVENOUS
Refills: 0 | Status: DISCONTINUED | OUTPATIENT
Start: 2024-01-19 | End: 2024-01-19

## 2024-01-19 RX ORDER — AZITHROMYCIN 500 MG/1
TABLET, FILM COATED ORAL
Refills: 0 | Status: DISCONTINUED | OUTPATIENT
Start: 2024-01-19 | End: 2024-01-19

## 2024-01-19 RX ORDER — ONDANSETRON 8 MG/1
4 TABLET, FILM COATED ORAL ONCE
Refills: 0 | Status: DISCONTINUED | OUTPATIENT
Start: 2024-01-19 | End: 2024-01-19

## 2024-01-19 RX ORDER — CEFEPIME 1 G/1
1000 INJECTION, POWDER, FOR SOLUTION INTRAMUSCULAR; INTRAVENOUS EVERY 12 HOURS
Refills: 0 | Status: DISCONTINUED | OUTPATIENT
Start: 2024-01-20 | End: 2024-01-20

## 2024-01-19 RX ORDER — MUPIROCIN 20 MG/G
1 OINTMENT TOPICAL
Refills: 0 | Status: COMPLETED | OUTPATIENT
Start: 2024-01-19 | End: 2024-01-23

## 2024-01-19 RX ORDER — CEFEPIME 1 G/1
INJECTION, POWDER, FOR SOLUTION INTRAMUSCULAR; INTRAVENOUS
Refills: 0 | Status: DISCONTINUED | OUTPATIENT
Start: 2024-01-19 | End: 2024-01-19

## 2024-01-19 RX ORDER — CEFEPIME 1 G/1
INJECTION, POWDER, FOR SOLUTION INTRAMUSCULAR; INTRAVENOUS
Refills: 0 | Status: DISCONTINUED | OUTPATIENT
Start: 2024-01-19 | End: 2024-01-20

## 2024-01-19 RX ORDER — DEXTROSE 50 % IN WATER 50 %
12.5 SYRINGE (ML) INTRAVENOUS ONCE
Refills: 0 | Status: COMPLETED | OUTPATIENT
Start: 2024-01-19 | End: 2024-01-19

## 2024-01-19 RX ORDER — FUROSEMIDE 40 MG
10 TABLET ORAL DAILY
Refills: 0 | Status: DISCONTINUED | OUTPATIENT
Start: 2024-01-19 | End: 2024-01-22

## 2024-01-19 RX ORDER — AZITHROMYCIN 500 MG/1
500 TABLET, FILM COATED ORAL ONCE
Refills: 0 | Status: DISCONTINUED | OUTPATIENT
Start: 2024-01-19 | End: 2024-01-19

## 2024-01-19 RX ORDER — INSULIN LISPRO 100/ML
VIAL (ML) SUBCUTANEOUS EVERY 6 HOURS
Refills: 0 | Status: DISCONTINUED | OUTPATIENT
Start: 2024-01-19 | End: 2024-01-20

## 2024-01-19 RX ORDER — AZITHROMYCIN 500 MG/1
500 TABLET, FILM COATED ORAL EVERY 24 HOURS
Refills: 0 | Status: DISCONTINUED | OUTPATIENT
Start: 2024-01-19 | End: 2024-01-20

## 2024-01-19 RX ORDER — LEVETIRACETAM 250 MG/1
250 TABLET, FILM COATED ORAL EVERY 12 HOURS
Refills: 0 | Status: DISCONTINUED | OUTPATIENT
Start: 2024-01-19 | End: 2024-01-31

## 2024-01-19 RX ADMIN — Medication 3 MILLILITER(S): at 09:58

## 2024-01-19 RX ADMIN — Medication 4 MILLILITER(S): at 14:33

## 2024-01-19 RX ADMIN — CEFEPIME 100 MILLIGRAM(S): 1 INJECTION, POWDER, FOR SOLUTION INTRAMUSCULAR; INTRAVENOUS at 19:02

## 2024-01-19 RX ADMIN — HEPARIN SODIUM 5000 UNIT(S): 5000 INJECTION INTRAVENOUS; SUBCUTANEOUS at 07:24

## 2024-01-19 RX ADMIN — SODIUM CHLORIDE 4 MILLILITER(S): 9 INJECTION INTRAMUSCULAR; INTRAVENOUS; SUBCUTANEOUS at 22:44

## 2024-01-19 RX ADMIN — MUPIROCIN 1 APPLICATION(S): 20 OINTMENT TOPICAL at 19:01

## 2024-01-19 RX ADMIN — Medication 3 MILLILITER(S): at 22:44

## 2024-01-19 RX ADMIN — Medication 3 MILLILITER(S): at 12:19

## 2024-01-19 RX ADMIN — Medication 4 MILLILITER(S): at 22:44

## 2024-01-19 RX ADMIN — HEPARIN SODIUM 5000 UNIT(S): 5000 INJECTION INTRAVENOUS; SUBCUTANEOUS at 15:25

## 2024-01-19 RX ADMIN — PANTOPRAZOLE SODIUM 40 MILLIGRAM(S): 20 TABLET, DELAYED RELEASE ORAL at 15:24

## 2024-01-19 RX ADMIN — Medication 12.5 GRAM(S): at 09:21

## 2024-01-19 RX ADMIN — SODIUM CHLORIDE 4 MILLILITER(S): 9 INJECTION INTRAMUSCULAR; INTRAVENOUS; SUBCUTANEOUS at 14:36

## 2024-01-19 RX ADMIN — Medication 3 MILLILITER(S): at 14:33

## 2024-01-19 RX ADMIN — LEVETIRACETAM 250 MILLIGRAM(S): 250 TABLET, FILM COATED ORAL at 07:25

## 2024-01-19 RX ADMIN — AZITHROMYCIN 255 MILLIGRAM(S): 500 TABLET, FILM COATED ORAL at 17:05

## 2024-01-19 RX ADMIN — PANTOPRAZOLE SODIUM 40 MILLIGRAM(S): 20 TABLET, DELAYED RELEASE ORAL at 19:01

## 2024-01-19 RX ADMIN — HEPARIN SODIUM 5000 UNIT(S): 5000 INJECTION INTRAVENOUS; SUBCUTANEOUS at 21:16

## 2024-01-19 RX ADMIN — SODIUM CHLORIDE 4 MILLILITER(S): 9 INJECTION INTRAMUSCULAR; INTRAVENOUS; SUBCUTANEOUS at 09:57

## 2024-01-19 RX ADMIN — LEVETIRACETAM 400 MILLIGRAM(S): 250 TABLET, FILM COATED ORAL at 21:16

## 2024-01-19 NOTE — SWALLOW VFSS/MBS ASSESSMENT ADULT - ADDITIONAL INFORMATION
Patient seen awake/alert during cineesophagram reevaluation this AM. Patient is [place] and English speaking. He was seated upright in the [facility] chair in the lateral plane. Pt was able to follow simple commands. He p/w baseline cough prior to PO intake.

## 2024-01-19 NOTE — PROGRESS NOTE ADULT - SUBJECTIVE AND OBJECTIVE BOX
Date of Service: 01-19-24 @ 11:25    Patient is a 89y old  Male who presents with a chief complaint of COVID19, Chest pain (19 Jan 2024 10:04)      Any change in ROS: neurologists son at bedside:  seems pretty good:  no sob:  no cough : no phlegm  : but sleepy:  is unable to cough out phlegm  for bronch today     MEDICATIONS  (STANDING):  albuterol/ipratropium for Nebulization 3 milliLiter(s) Nebulizer every 6 hours  amLODIPine   Tablet 5 milliGRAM(s) Oral daily  aspirin  chewable 81 milliGRAM(s) Oral daily  chlorhexidine 2% Cloths 1 Application(s) Topical daily  escitalopram 10 milliGRAM(s) Oral daily  furosemide    Tablet 20 milliGRAM(s) Oral daily  gabapentin Solution 100 milliGRAM(s) Oral two times a day  heparin   Injectable 5000 Unit(s) SubCutaneous every 8 hours  insulin glargine Injectable (LANTUS) 14 Unit(s) SubCutaneous at bedtime  insulin lispro (ADMELOG) corrective regimen sliding scale   SubCutaneous at bedtime  insulin lispro (ADMELOG) corrective regimen sliding scale   SubCutaneous three times a day before meals  levETIRAcetam   Injectable 250 milliGRAM(s) IV Push every 12 hours  melatonin 3 milliGRAM(s) Oral at bedtime  memantine 5 milliGRAM(s) Oral two times a day  metoprolol tartrate 25 milliGRAM(s) Oral two times a day  mometasone 110 MICROgram(s) Inhaler 2 Puff(s) Inhalation every 12 hours  mupirocin 2% Ointment 1 Application(s) Topical two times a day  pantoprazole  Injectable 40 milliGRAM(s) IV Push every 12 hours  ranolazine 500 milliGRAM(s) Oral two times a day  sodium chloride 3%  Inhalation 4 milliLiter(s) Inhalation every 6 hours  sucralfate 1 Gram(s) Oral every 12 hours  ticagrelor 60 milliGRAM(s) Oral every 12 hours    MEDICATIONS  (PRN):  acetaminophen     Tablet .. 650 milliGRAM(s) Oral every 6 hours PRN Temp greater or equal to 38C (100.4F), Mild Pain (1 - 3), Moderate Pain (4 - 6)  guaiFENesin Oral Liquid (Sugar-Free) 100 milliGRAM(s) Oral every 6 hours PRN Cough    Vital Signs Last 24 Hrs  T(C): 36.7 (19 Jan 2024 10:45), Max: 36.7 (18 Jan 2024 16:43)  T(F): 98 (19 Jan 2024 10:45), Max: 98 (18 Jan 2024 16:43)  HR: 81 (19 Jan 2024 10:45) (70 - 82)  BP: 115/49 (19 Jan 2024 10:45) (110/48 - 140/61)  BP(mean): --  RR: 18 (19 Jan 2024 10:45) (16 - 19)  SpO2: 98% (19 Jan 2024 10:45) (94% - 100%)    Parameters below as of 19 Jan 2024 10:45  Patient On (Oxygen Delivery Method): nasal cannula  O2 Flow (L/min): 3      I&O's Summary    18 Jan 2024 07:01  -  19 Jan 2024 07:00  --------------------------------------------------------  IN: 580 mL / OUT: 450 mL / NET: 130 mL    19 Jan 2024 07:01  -  19 Jan 2024 11:25  --------------------------------------------------------  IN: 0 mL / OUT: 0 mL / NET: 0 mL          Physical Exam:   GENERAL: NAD, well-groomed, well-developed  HEENT: JAVAD/   Atraumatic, Normocephalic  ENMT: No tonsillar erythema, exudates, or enlargement; Moist mucous membranes, Good dentition, No lesions  NECK: Supple, No JVD, Normal thyroid  CHEST/LUNG: crackles bila   CVS: Regular rate and rhythm; No murmurs, rubs, or gallops  GI: : Soft, Nontender, Nondistended; Bowel sounds present  NERVOUS SYSTEM:  awake andnot in any resp distress  EXTREMITIES:  -edema  LYMPH: No lymphadenopathy noted  SKIN: No rashes or lesions  ENDOCRINOLOGY: No Thyromegaly  PSYCH: CALM     Labs:  37                            8.9    8.25  )-----------( 284      ( 19 Jan 2024 06:16 )             27.7                         8.9    7.40  )-----------( 277      ( 18 Jan 2024 06:17 )             27.9                         9.0    7.43  )-----------( 254      ( 17 Jan 2024 06:17 )             28.5                         9.6    7.10  )-----------( 298      ( 16 Jan 2024 06:28 )             30.6     01-19    142  |  101  |  14  ----------------------------<  60<L>  3.9   |  29  |  1.54<H>  01-18    139  |  101  |  15  ----------------------------<  105<H>  3.5   |  29  |  1.55<H>  01-17    141  |  101  |  16  ----------------------------<  103<H>  3.3<L>   |  31  |  1.57<H>  01-16    139  |  98  |  16  ----------------------------<  147<H>  3.3<L>   |  31  |  1.58<H>    Ca    8.4      19 Jan 2024 06:16  Ca    8.3<L>      18 Jan 2024 06:17  Phos  3.0     01-19  Phos  2.3     01-18  Mg     1.80     01-19  Mg     1.90     01-18      CAPILLARY BLOOD GLUCOSE      POCT Blood Glucose.: 138 mg/dL (19 Jan 2024 10:00)  POCT Blood Glucose.: 69 mg/dL (19 Jan 2024 08:59)  POCT Blood Glucose.: 66 mg/dL (19 Jan 2024 08:54)  POCT Blood Glucose.: 67 mg/dL (19 Jan 2024 08:53)  POCT Blood Glucose.: 71 mg/dL (19 Jan 2024 06:42)  POCT Blood Glucose.: 55 mg/dL (19 Jan 2024 06:37)  POCT Blood Glucose.: 197 mg/dL (18 Jan 2024 21:10)  POCT Blood Glucose.: 259 mg/dL (18 Jan 2024 17:49)  POCT Blood Glucose.: 127 mg/dL (18 Jan 2024 12:32)        PT/INR - ( 19 Jan 2024 06:16 )   PT: 12.6 sec;   INR: 1.12 ratio         PTT - ( 19 Jan 2024 06:16 )  PTT:34.0 sec  Urinalysis Basic - ( 19 Jan 2024 06:16 )    Color: x / Appearance: x / SG: x / pH: x  Gluc: 60 mg/dL / Ketone: x  / Bili: x / Urobili: x   Blood: x / Protein: x / Nitrite: x   Leuk Esterase: x / RBC: x / WBC x   Sq Epi: x / Non Sq Epi: x / Bacteria: x        < from: CT Chest No Cont (01.16.24 @ 18:04) >  lobes. Patchy groundglass opacities in the bilateral aerated lobes. Lung   nodules difficult to visualize due to pulmonary disease.  MEDIASTINUM AND GENA: No lymphadenopathy.  VESSELS: Aortic and coronary artery calcification.  HEART: Heart size is enlarged. No pericardial effusion.  CHEST WALL AND LOWER NECK: Left chest wall pacemaker with leads in the   right atrium and right ventricle. Status post CABG.  VISUALIZED UPPER ABDOMEN: Clips within the stomach, likely from previous   procedure.  BONES: Degenerative changes. Healed sternotomy.    IMPRESSION:  New small bilateral pleural effusions with associated complete collapse   of the right middle and right lower lobes and partial atelectasis of the   right upper and left lower lobes.    Patchy bilateral groundglass opacities are consistent with pulmonary   edema.    Layering secretions in the trachea, left mainstem bronchus, and right   lower lobe bronchus.    --- End of Report ---          STEFFANY PATEL MD; Resident Radiologist  This document has been electronically signed.  URIEL MOTLEY DO; Attending Radiologist  Thisdocument has been electronically signed. Jan 17 2024 12:13PM    < end of copied text >  < from: CT Chest No Cont (01.16.24 @ 18:04) >  lobes. Patchy groundglass opacities in the bilateral aerated lobes. Lung   nodules difficult to visualize due to pulmonary disease.  MEDIASTINUM AND GENA: No lymphadenopathy.  VESSELS: Aortic and coronary artery calcification.  HEART: Heart size is enlarged. No pericardial effusion.  CHEST WALL AND LOWER NECK: Left chest wall pacemaker with leads in the   right atrium and right ventricle. Status post CABG.  VISUALIZED UPPER ABDOMEN: Clips within the stomach, likely from previous   procedure.  BONES: Degenerative changes. Healed sternotomy.    IMPRESSION:  New small bilateral pleural effusions with associated complete collapse   of the right middle and right lower lobes and partial atelectasis of the   right upper and left lower lobes.    Patchy bilateral groundglass opacities are consistent with pulmonary   edema.    Layering secretions in the trachea, left mainstem bronchus, and right   lower lobe bronchus.    --- End of Report ---          STEFFANY PATEL MD; Resident Radiologist  This document has been electronically signed.  URIEL MOTLEY DO; Attending Radiologist  Thisdocument has been electronically signed. Jan 17 2024 12:13PM    < end of copied text >      RECENT CULTURES:        RESPIRATORY CULTURES:          Studies  Chest X-RAY  CT SCAN Chest   Venous Dopplers: LE:   CT Abdomen  Others

## 2024-01-19 NOTE — CONSULT NOTE ADULT - NS ATTEST RISK PROBLEM GEN_ALL_CORE FT
High risk of decompensation secondary to above co-morbidities, .review of laboratory data, radiology results, discussion with primary team\patient, discussion with consulting services, and monitoring for potential decompensation. Interventions were performed as documented above.

## 2024-01-19 NOTE — PROGRESS NOTE ADULT - ASSESSMENT
on distress   on  soft diet.  sp bronchoscopy     ROS ; no sob   acetaminophen   IVPB .. 1000 milliGRAM(s) IV Intermittent every 6 hours PRN  albuterol/ipratropium for Nebulization 3 milliLiter(s) Nebulizer every 12 hours  amLODIPine   Tablet 5 milliGRAM(s) Oral daily  aspirin  chewable 81 milliGRAM(s) Oral daily  dornase cierra Solution 2.5 milliGRAM(s) Inhalation every 12 hours  escitalopram 10 milliGRAM(s) Oral daily  gabapentin Solution 100 milliGRAM(s) Oral two times a day  heparin   Injectable 5000 Unit(s) SubCutaneous every 8 hours  insulin glargine Injectable (LANTUS) 14 Unit(s) SubCutaneous at bedtime  insulin lispro (ADMELOG) corrective regimen sliding scale   SubCutaneous every 6 hours  levETIRAcetam   Injectable 250 milliGRAM(s) IV Push every 12 hours  melatonin 3 milliGRAM(s) Oral at bedtime  memantine 5 milliGRAM(s) Oral two times a day  metoprolol tartrate Injectable 5 milliGRAM(s) IV Push every 6 hours  mometasone 110 MICROgram(s) Inhaler 2 Puff(s) Inhalation every 12 hours  pantoprazole  Injectable 40 milliGRAM(s) IV Push every 12 hours  ranolazine 500 milliGRAM(s) Oral two times a day  sodium chloride 0.45% with potassium chloride 20 mEq/L 1000 milliLiter(s) IV Continuous <Continuous>  sodium phosphate 30 milliMole(s)/500 mL IVPB 30 milliMole(s) IV Intermittent once  sucralfate 1 Gram(s) Oral every 12 hours  ticagrelor 60 milliGRAM(s) Enteral Tube every 12 hours      VITAL:  T(C): , Max: 37 (01-06-24 @ 21:06)  T(F): , Max: 98.6 (01-06-24 @ 21:06)  HR: 86 (01-07-24 @ 05:45)  BP: 180/82 (01-07-24 @ 05:45)  BP(mean): --  RR: 19 (01-07-24 @ 05:45)  SpO2: 95% (01-07-24 @ 05:45)  Wt(kg): --    01-06-24 @ 07:01  -  01-07-24 @ 07:00  --------------------------------------------------------  IN: 185 mL / OUT: 0 mL / NET: 185 mL        PHYSICAL EXAM:  General: NAD; Alert  HEENT:  NCAT; PERRLA, +NGT  Neck: No JVD; supple  Respiratory: b/l scattered rales   Cardiac: RRR s1s2  Gastrointestinal: BS+, soft, NT/ND  Urologic: No delong  Extremities: No peripheral edema  Access:     LABS:                          9.2    10.00 )-----------( 312      ( 07 Jan 2024 06:00 )             28.6     Na(137)/K(5.2)/Cl(109)/HCO3(19)/BUN(21)/Cr(1.35)Glu(301)/Ca(7.8)/Mg(2.20)/PO4(2.2)    01-07 @ 06:00  Na(143)/K(5.0)/Cl(111)/HCO3(20)/BUN(20)/Cr(1.49)Glu(195)/Ca(8.2)/Mg(1.90)/PO4(2.7)    01-06 @ 07:35  Na(142)/K(5.1)/Cl(111)/HCO3(19)/BUN(22)/Cr(1.45)Glu(219)/Ca(8.0)/Mg(1.90)/PO4(3.2)    01-05 @ 22:19  Na(140)/K(5.0)/Cl(111)/HCO3(16)/BUN(24)/Cr(1.41)Glu(199)/Ca(8.1)/Mg(1.90)/PO4(3.3)    01-05 @ 01:00    Urinalysis Basic - ( 07 Jan 2024 06:00 )    Color: x / Appearance: x / SG: x / pH: x  Gluc: 301 mg/dL / Ketone: x  / Bili: x / Urobili: x   Blood: x / Protein: x / Nitrite: x   Leuk Esterase: x / RBC: x / WBC x   Sq Epi: x / Non Sq Epi: x / Bacteria: x    IMAGES:    PROCEDURE:  CT of the Chest was performed.  Sagittal and coronal reformats were performed.    FINDINGS:    LUNGS/AIRWAYS/PLEURA: Layering secretions in the trachea, left mainstem   bronchus, and right lower lobe bronchus. New small bilateral pleural   effusions with associated complete collapse of the right middle and right   lower lobes and partial atelectasis of the right upper and left lower   lobes. Patchy groundglass opacities in the bilateral aerated lobes. Lung   nodules difficult to visualize due to pulmonary disease.  MEDIASTINUM AND GENA: No lymphadenopathy.  VESSELS: Aortic and coronary artery calcification.  HEART: Heart size is enlarged. No pericardial effusion.  CHEST WALL AND LOWER NECK: Left chest wall pacemaker with leads in the   right atrium and right ventricle. Status post CABG.  VISUALIZED UPPER ABDOMEN: Clips within the stomach, likely from previous   procedure.  BONES: Degenerative changes. Healed sternotomy.    IMPRESSION:  New small bilateral pleural effusions with associated complete collapse   of the right middle and right lower lobes and partial atelectasis of the   right upper and left lower lobes.    Patchy bilateral groundglass opacities are consistent with pulmonary   edema.    Layering secretions in the trachea, left mainstem bronchus, and right   lower lobe bronchus.        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 a-w lasix 20 mg daily , schedule bladder scan x8h   2 hypokalemia-  a-w repletion   3 Hypophosphatemia -repleted , please give an extra dose too   ( might be refeeding)  4 CV - BP soft stable , on Lopressor 25 mg bid , and amlodipine 5 mg daily ( with parameters)  5 Vasc - s/p SMA stent placement;   6 Sx - s/p HIDA + for acute asa, medical management, on puree diet , encourage free water in take too (if allowed  with aspiration precautions)   7 GI - s/p EGD clipping of bleeding duodenal ulcers   8 Anemia- hgb stable   9Pul-chest PT , sp bronch , b/l moderated effusion with atelectasis and ground glass opacities           Elsa Nunez  [facility]  Office: [phone]     on distress   on  soft diet.  sp bronchoscopy ,   RRT called due to hypoxia and  hypotension     ROS ; no sob   acetaminophen   IVPB .. 1000 milliGRAM(s) IV Intermittent every 6 hours PRN  albuterol/ipratropium for Nebulization 3 milliLiter(s) Nebulizer every 12 hours  amLODIPine   Tablet 5 milliGRAM(s) Oral daily  aspirin  chewable 81 milliGRAM(s) Oral daily  dornase cierra Solution 2.5 milliGRAM(s) Inhalation every 12 hours  escitalopram 10 milliGRAM(s) Oral daily  gabapentin Solution 100 milliGRAM(s) Oral two times a day  heparin   Injectable 5000 Unit(s) SubCutaneous every 8 hours  insulin glargine Injectable (LANTUS) 14 Unit(s) SubCutaneous at bedtime  insulin lispro (ADMELOG) corrective regimen sliding scale   SubCutaneous every 6 hours  levETIRAcetam   Injectable 250 milliGRAM(s) IV Push every 12 hours  melatonin 3 milliGRAM(s) Oral at bedtime  memantine 5 milliGRAM(s) Oral two times a day  metoprolol tartrate Injectable 5 milliGRAM(s) IV Push every 6 hours  mometasone 110 MICROgram(s) Inhaler 2 Puff(s) Inhalation every 12 hours  pantoprazole  Injectable 40 milliGRAM(s) IV Push every 12 hours  ranolazine 500 milliGRAM(s) Oral two times a day  sodium chloride 0.45% with potassium chloride 20 mEq/L 1000 milliLiter(s) IV Continuous <Continuous>  sodium phosphate 30 milliMole(s)/500 mL IVPB 30 milliMole(s) IV Intermittent once  sucralfate 1 Gram(s) Oral every 12 hours  ticagrelor 60 milliGRAM(s) Enteral Tube every 12 hours      VITAL:  T(C): , Max: 37 (01-06-24 @ 21:06)  T(F): , Max: 98.6 (01-06-24 @ 21:06)  HR: 86 (01-07-24 @ 05:45)  BP: 180/82 (01-07-24 @ 05:45)  BP(mean): --  RR: 19 (01-07-24 @ 05:45)  SpO2: 95% (01-07-24 @ 05:45)  Wt(kg): --    01-06-24 @ 07:01  -  01-07-24 @ 07:00  --------------------------------------------------------  IN: 185 mL / OUT: 0 mL / NET: 185 mL        PHYSICAL EXAM:  General: NAD; Alert  HEENT:  NCAT; PERRLA, +NGT  Neck: No JVD; supple  Respiratory: b/l scattered rales   Cardiac: RRR s1s2  Gastrointestinal: BS+, soft, NT/ND  Urologic: No delong  Extremities: No peripheral edema  Access:     LABS:                          9.2    10.00 )-----------( 312      ( 07 Jan 2024 06:00 )             28.6     Na(137)/K(5.2)/Cl(109)/HCO3(19)/BUN(21)/Cr(1.35)Glu(301)/Ca(7.8)/Mg(2.20)/PO4(2.2)    01-07 @ 06:00  Na(143)/K(5.0)/Cl(111)/HCO3(20)/BUN(20)/Cr(1.49)Glu(195)/Ca(8.2)/Mg(1.90)/PO4(2.7)    01-06 @ 07:35  Na(142)/K(5.1)/Cl(111)/HCO3(19)/BUN(22)/Cr(1.45)Glu(219)/Ca(8.0)/Mg(1.90)/PO4(3.2)    01-05 @ 22:19  Na(140)/K(5.0)/Cl(111)/HCO3(16)/BUN(24)/Cr(1.41)Glu(199)/Ca(8.1)/Mg(1.90)/PO4(3.3)    01-05 @ 01:00    Urinalysis Basic - ( 07 Jan 2024 06:00 )    Color: x / Appearance: x / SG: x / pH: x  Gluc: 301 mg/dL / Ketone: x  / Bili: x / Urobili: x   Blood: x / Protein: x / Nitrite: x   Leuk Esterase: x / RBC: x / WBC x   Sq Epi: x / Non Sq Epi: x / Bacteria: x    IMAGES:    PROCEDURE:  CT of the Chest was performed.  Sagittal and coronal reformats were performed.    FINDINGS:    LUNGS/AIRWAYS/PLEURA: Layering secretions in the trachea, left mainstem   bronchus, and right lower lobe bronchus. New small bilateral pleural   effusions with associated complete collapse of the right middle and right   lower lobes and partial atelectasis of the right upper and left lower   lobes. Patchy groundglass opacities in the bilateral aerated lobes. Lung   nodules difficult to visualize due to pulmonary disease.  MEDIASTINUM AND GENA: No lymphadenopathy.  VESSELS: Aortic and coronary artery calcification.  HEART: Heart size is enlarged. No pericardial effusion.  CHEST WALL AND LOWER NECK: Left chest wall pacemaker with leads in the   right atrium and right ventricle. Status post CABG.  VISUALIZED UPPER ABDOMEN: Clips within the stomach, likely from previous   procedure.  BONES: Degenerative changes. Healed sternotomy.    IMPRESSION:  New small bilateral pleural effusions with associated complete collapse   of the right middle and right lower lobes and partial atelectasis of the   right upper and left lower lobes.    Patchy bilateral groundglass opacities are consistent with pulmonary   edema.    Layering secretions in the trachea, left mainstem bronchus, and right   lower lobe bronchus.        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 , schedule bladder scan x8h , Lasix 20 mg with parameters   2 hypokalemia-  a-w repletion   3 Hypophosphatemia -repleted   4 CV - BP soft   5 Vasc - s/p SMA stent placement;   6 Sx - s/p HIDA + for acute asa, medical management, on puree diet , encourage free water in take too (if allowed  with aspiration precautions)   7 GI - s/p EGD clipping of bleeding duodenal ulcers   8 Anemia- hgb stable   9Pul-chest PT , sp bronch , b/l moderated effusion with atelectasis and ground glass opacities           [facility]  [facility]  Office: [phone]

## 2024-01-19 NOTE — SWALLOW VFSS/MBS ASSESSMENT ADULT - COMMENTS
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 CTA demonstrating mesenteric fat stranding associated with ascending/transverse colon. S/p SMA angiography with stent placement with diagnostic laparoscopy 12/24, small bowel and visible colon viable, some inflammation of omentum in RUQ. Course c/b GI bleed, s/p scope on 1/2 with GI with clips placed. Patient transferred overnight from SICU to the floor in clinically stable condition."    CT Chest 1/16: "IMPRESSION: New small bilateral pleural effusions with associated complete collapse of the right middle and right lower lobes and partial atelectasis of the right upper and left lower lobes. Patchy bilateral groundglass opacities are consistent with pulmonary edema. Layering secretions in the trachea, left mainstem bronchus, and right lower lobe bronchus."    Of note, patient seen for swallow evaluations on 1/3 and 1/7, and a Cinesophagram on 1/8 with recommendations of Puree with Moderately Thick Liquids. (see report details for details)

## 2024-01-19 NOTE — SWALLOW VFSS/MBS ASSESSMENT ADULT - DIAGNOSTIC IMPRESSIONS
Pt re-examined and is exhibiting similar results when compared to prior MBS dated 1/8/24. Pt with continued oropharyngeal deficits marked by reduced lingual control/tongue to palate contract resulting in premature spillage of fluids extending beyond the valleculae, a delayed swallow trigger, moderately reduced tongue base retraction, mildly reduced pharyngeal contractibility, mildly reduced hyolaryngeal elevation. Moderate residue was viewed post primary swallow of puree in the valleculae which improved with a liquid wash. There was penetration above the vocal cords without retrieval for mildly thick liquids, and deep laryngeal penetration with subsequent aspiration and inadequate clearance from trachea. No penetration or aspiration, before during or after the swallow for puree and moderately thick liquids.
Patient presents with Mild Oral and Moderate/Severe Pharyngeal Stage Dysphagia. The Oral Stage is characterized by adequate oral containment, adequate bolus manipulation, adequate tongue motion for anterior to posterior transfer of the bolus for puree; premature spillage to the oropharynx for Thin liquids due to reduced tongue to palate seal; with adequate oral clearance. The Pharyngeal Stage is characterized by delayed initiation of the pharyngeal swallow (Bolus head is at the vallecular for Thin Liquids), reduced laryngeal elevation with incomplete laryngeal vestibular closure, reduced tongue base retraction and reduced pharyngeal constriction. There is trace/mild pharyngeal clearance deficits located in the vallecular/pyriforms post primary swallow. A liquid wash and reswallow improves pharyngeal clearance.  There was Laryngeal Penetration during the swallow for Mildly Thick Liquids without retrieval leaving trace residue above the level of the vocal folds. There was Deep Laryngeal Penetration during the swallow leading to Aspiration for Thin Liquids. Patient is sensate to the Aspiration given cough response. However Patient is unable to effectively clear the contrast from the airway/trachea with cough response.  There was No Aspiration observed before, during or after the swallow for puree and moderately thick liquids.

## 2024-01-19 NOTE — PROGRESS NOTE ADULT - SUBJECTIVE AND OBJECTIVE BOX
Date of Service  : 01-19-24     INTERVAL HPI/OVERNIGHT EVENTS: Seen and examined earlier with two sons in room. Going for bronchoscopy.   Vital Signs Last 24 Hrs  T(C): 36.7 (19 Jan 2024 13:15), Max: 36.7 (18 Jan 2024 16:43)  T(F): 98 (19 Jan 2024 13:15), Max: 98 (18 Jan 2024 16:43)  HR: 80 (19 Jan 2024 13:15) (70 - 88)  BP: 126/54 (19 Jan 2024 13:15) (110/48 - 145/50)  BP(mean): 73 (19 Jan 2024 13:15) (65 - 109)  RR: 19 (19 Jan 2024 13:15) (14 - 22)  SpO2: 94% (19 Jan 2024 13:15) (92% - 100%)    Parameters below as of 19 Jan 2024 13:15  Patient On (Oxygen Delivery Method): nasal cannula  O2 Flow (L/min): 3    I&O's Summary    18 Jan 2024 07:01  -  19 Jan 2024 07:00  --------------------------------------------------------  IN: 580 mL / OUT: 450 mL / NET: 130 mL    19 Jan 2024 07:01  -  19 Jan 2024 14:36  --------------------------------------------------------  IN: 0 mL / OUT: 0 mL / NET: 0 mL      MEDICATIONS  (STANDING):  acetylcysteine 20%  Inhalation 4 milliLiter(s) Inhalation every 6 hours  albuterol/ipratropium for Nebulization 3 milliLiter(s) Nebulizer every 6 hours  amLODIPine   Tablet 5 milliGRAM(s) Oral daily  aspirin  chewable 81 milliGRAM(s) Oral daily  chlorhexidine 2% Cloths 1 Application(s) Topical daily  escitalopram 10 milliGRAM(s) Oral daily  furosemide    Tablet 20 milliGRAM(s) Oral daily  gabapentin Solution 100 milliGRAM(s) Oral two times a day  heparin   Injectable 5000 Unit(s) SubCutaneous every 8 hours  insulin glargine Injectable (LANTUS) 14 Unit(s) SubCutaneous at bedtime  insulin lispro (ADMELOG) corrective regimen sliding scale   SubCutaneous at bedtime  insulin lispro (ADMELOG) corrective regimen sliding scale   SubCutaneous three times a day before meals  lactated ringers. 1000 milliLiter(s) (75 mL/Hr) IV Continuous <Continuous>  levETIRAcetam   Injectable 250 milliGRAM(s) IV Push every 12 hours  melatonin 3 milliGRAM(s) Oral at bedtime  memantine 5 milliGRAM(s) Oral two times a day  metoprolol tartrate 25 milliGRAM(s) Oral two times a day  mometasone 110 MICROgram(s) Inhaler 2 Puff(s) Inhalation every 12 hours  mupirocin 2% Ointment 1 Application(s) Topical two times a day  pantoprazole  Injectable 40 milliGRAM(s) IV Push every 12 hours  ranolazine 500 milliGRAM(s) Oral two times a day  sodium chloride 3%  Inhalation 4 milliLiter(s) Inhalation every 6 hours  sucralfate 1 Gram(s) Oral every 12 hours  ticagrelor 60 milliGRAM(s) Oral every 12 hours    MEDICATIONS  (PRN):  acetaminophen     Tablet .. 650 milliGRAM(s) Oral every 6 hours PRN Temp greater or equal to 38C (100.4F), Mild Pain (1 - 3), Moderate Pain (4 - 6)  fentaNYL    Injectable 50 MICROGram(s) IV Push every 5 minutes PRN Severe Pain (7 - 10)  guaiFENesin Oral Liquid (Sugar-Free) 100 milliGRAM(s) Oral every 6 hours PRN Cough  ondansetron Injectable 4 milliGRAM(s) IV Push once PRN Nausea and/or Vomiting  oxyCODONE    IR 5 milliGRAM(s) Oral once PRN Moderate Pain (4 - 6)    LABS:                        8.9    8.25  )-----------( 284      ( 19 Jan 2024 06:16 )             27.7     01-19    142  |  101  |  14  ----------------------------<  60<L>  3.9   |  29  |  1.54<H>    Ca    8.4      19 Jan 2024 06:16  Phos  3.0     01-19  Mg     1.80     01-19      PT/INR - ( 19 Jan 2024 06:16 )   PT: 12.6 sec;   INR: 1.12 ratio         PTT - ( 19 Jan 2024 06:16 )  PTT:34.0 sec  Urinalysis Basic - ( 19 Jan 2024 06:16 )    Color: x / Appearance: x / SG: x / pH: x  Gluc: 60 mg/dL / Ketone: x  / Bili: x / Urobili: x   Blood: x / Protein: x / Nitrite: x   Leuk Esterase: x / RBC: x / WBC x   Sq Epi: x / Non Sq Epi: x / Bacteria: x      CAPILLARY BLOOD GLUCOSE      POCT Blood Glucose.: 103 mg/dL (19 Jan 2024 13:25)  POCT Blood Glucose.: 105 mg/dL (19 Jan 2024 11:55)  POCT Blood Glucose.: 138 mg/dL (19 Jan 2024 10:00)  POCT Blood Glucose.: 69 mg/dL (19 Jan 2024 08:59)  POCT Blood Glucose.: 66 mg/dL (19 Jan 2024 08:54)  POCT Blood Glucose.: 67 mg/dL (19 Jan 2024 08:53)  POCT Blood Glucose.: 71 mg/dL (19 Jan 2024 06:42)  POCT Blood Glucose.: 55 mg/dL (19 Jan 2024 06:37)  POCT Blood Glucose.: 197 mg/dL (18 Jan 2024 21:10)  POCT Blood Glucose.: 259 mg/dL (18 Jan 2024 17:49)        Urinalysis Basic - ( 19 Jan 2024 06:16 )    Color: x / Appearance: x / SG: x / pH: x  Gluc: 60 mg/dL / Ketone: x  / Bili: x / Urobili: x   Blood: x / Protein: x / Nitrite: x   Leuk Esterase: x / RBC: x / WBC x   Sq Epi: x / Non Sq Epi: x / Bacteria: x          RADIOLOGY & ADDITIONAL TESTS:    Consultant(s) Notes Reviewed:  [x ] YES  [ ] NO    PHYSICAL EXAM:  GENERAL: NAD, well-groomed, well-developed,not in any distress ,  HEAD:  Atraumatic, Normocephalic  NECK: Supple, No JVD, Normal thyroid  NERVOUS SYSTEM:  Alert & Oriented X3, No focal deficit   CHEST/LUNG: Good air entry bilateral except bases   HEART: Regular rate and rhythm; No murmurs, rubs, or gallops  ABDOMEN: Soft, Nontender, Nondistended; Bowel sounds present  EXTREMITIES:  +edema      Care Discussed with Consultants/Other Providers [ x] YES  [ ] NO

## 2024-01-19 NOTE — SWALLOW VFSS/MBS ASSESSMENT ADULT - ORAL PHASE
Uncontrolled bolus / spillover in brittani-pharynx Delayed oral transit time/Reduced anterior - posterior transport/Uncontrolled bolus / spillover in brittani-pharynx/Uncontrolled bolus / spillover in hypopharynx Delayed oral transit time/Reduced anterior - posterior transport/Uncontrolled bolus / spillover in brittani-pharynx Delayed oral transit time/Reduced anterior - posterior transport

## 2024-01-19 NOTE — SWALLOW VFSS/MBS ASSESSMENT ADULT - PHARYNGEAL PHASE COMMENTS
Moderate swallow trigger delay, moderately reduced tongue base retraction, mildly reduced pharyngeal contractibility, adequate hyolaryngeal elevation. No penetration or aspiration. There was moderate residue post primary swallow in the valleculae. Pt exhibited 3 additional swallows with mild reduction in residue. Moderate swallow trigger delay, moderately reduced tongue base retraction, mildly reduced pharyngeal contractibility, adequate hyolaryngeal elevation. No penetration or aspiration. Mild residue viewed in the piriform sinuses Moderate swallow trigger delay, moderately reduced tongue base retraction, mildly reduced pharyngeal contractibility, adequate hyolaryngeal elevation. There was silent penetration above the vocal cords without retrieval. There was mild residue post primary swallow in the valleculae. Pt exhibited 3 additional swallows with mild reduction in residue. Moderate swallow trigger delay, moderately reduced tongue base retraction, mildly reduced pharyngeal contractibility, mildly reduced hyolaryngeal elevation/reduced epiglottic closure. There was silent penetration to the level of the vocal cords and subsequent aspiration. Pt did not exhibit a volitional cough.

## 2024-01-19 NOTE — PRE-OP CHECKLIST - BP NONINVASIVE SYSTOLIC (MM HG)
[Weight management counseling provided] : Weight management [Healthy eating counseling provided] : healthy eating [Activity counseling provided] : activity [Behavioral health counseling provided] : behavioral health  [Engage in a relaxing activity] : Engage in a relaxing activity [None] : None [Good understanding] : Patient has a good understanding of lifestyle changes and the steps needed to achieve self management goals 124

## 2024-01-19 NOTE — RAPID RESPONSE TEAM SUMMARY - NSSITUATIONBACKGROUNDRRT_GEN_ALL_CORE
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 Hospital course complicated by worsening secretions. CT chest performed 1/16 showed bilateral secretions worse in the right with complete collapse of RML and RLL with partial atelectasis of the RUL and LLL. s/p bronchoscopy 1/19 today. Pt was found to be hypoxic to 70s upon arrival to floors from PACU, RRT called.    On arrival, pulmonology fellow at bedside, VS T 98.3, /121, , RR 24, SpO2 85% on NRB, . On exam, pt awake, alert, but lethargic appearing with increased WOB on NRB, b/l rhonchi, increased secretions. Oxygenation remained tenuous fluctuating around mid 80s despite nasotracheal suctioning and manual chest PT, eventually stabilized around 90-95% on HFNC. Still with signification secretions so patient transferred to MICU for close airway monitoring.

## 2024-01-19 NOTE — ADVANCED PRACTICE NURSE CONSULT - ASSESSMENT
Right arm cleansed with CHG. Under ultrasound guidance, placed AccbitHoundth Ace Intravascular Peripheral Catheter 20G / 5.71cm into Right Basilic Vein. Brisk  blood return, flushed with 20mls normal saline. Minimal blood loss. Patient tolerated procedure well. CHG dressing placed. All sharps accounted for. LOT# [id number], REF# [id number]

## 2024-01-19 NOTE — CONSULT NOTE ADULT - ATTENDING COMMENTS
89M with PMH of CABG PPM DM2 CKD, CVA, increased secretions with poor management. Exam notable for decreased right breath sounds and rhonchi worse over trachea. Discussed high risk of failed extubation following bronchoscopy with family given chronic weakness however unlikely to clear secretions independent. Continue non-invasive airway clearance. Plan for bronchoscopy.     Rangel Kinsey MD  Interventional Pulmonology & Critical Care Medicine

## 2024-01-19 NOTE — CHART NOTE - NSCHARTNOTEFT_GEN_A_CORE
: Tiffany Kidd  INDICATION: Hypoxemic respiratory failure  PROCEDURE:    [x] LIMITED ECHO    [x] LIMITED CHEST    FINDINGS:  Left sided apical B lines with small to moderate Left sided simple appearing pleural effusion  Right sided atelectasis with consolidated lung to the Right apex with small to moderate simple appearing pleural effusion  Normal LVSF  RV<LV    INTERPRETATION:  Completely consolidated Right lung concerning for atelectasis in the setting of mucous plugging vs inflammation  Normal LVSF  bilateral small to moderate simple appearing pleural effusions likely due to atelectatic lung rather than external compression of lung    Images stored on [employer]

## 2024-01-19 NOTE — CONSULT NOTE ADULT - ASSESSMENT
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 Hospital course complicated by worsening secretions. CT chest performed 1/16 showed bilateral secretions worse in the right with complete collapse of RML and RLL with partial atelectasis of the RUL and LLL.   Interventional pulmonology consulted for bronchoscopy for airway clearance.     #Copious secretions   - Pt unable to expectorate secretions  - CT chest with worsening Right lung atelectasis and copious secretions  - Bronchoscopy with BAL performed earlier today with notable copious secretions and edematous bronchial mucosa through out the bilateral airways  - Pt tolerated anesthesia and PACU well but was noted to be in respiratory distress with audible upper airway secretions so RRT was called  - Pt stabilized with aggressive airway clearance and pulmonary toilet however requiring high level of support to maintain oxygenation  - Plan fo transfer to MICU  - C/w aggressive airway clearance and pulmonary toilet     Case discussed with Dr. Kinsey

## 2024-01-19 NOTE — PRE-OP CHECKLIST - LAST TOOK
ANTICOAGULATION FOLLOW-UP CLINIC VISIT    Patient Name:  Shala Caruso  Date:  2/12/2021  Contact Type:  Telephone    SUBJECTIVE:  Patient Findings     Comments:  Factor 2 on 2/11/21:  15 %   Goal range is 15 - 25.        Clinical Outcomes     Comments:  Factor 2 on 2/11/21:  15 %   Goal range is 15 - 25.           OBJECTIVE    Recent labs: (last 7 days)     02/11/21  1334   F2 15*       ASSESSMENT / PLAN  INR assessment THER    Recheck INR In: 2 WEEKS    INR Location Clinic      Anticoagulation Summary  As of 2/12/2021    INR goal:  2.0-3.0   TTR:  --   INR used for dosing:  No new INR was available at the time of this encounter.   Warfarin maintenance plan:  10 mg (5 mg x 2) every day   Full warfarin instructions:  10 mg every day   Weekly warfarin total:  70 mg   Plan last modified:  Gwen Abel RN (2/12/2021)   Next INR check:  2/25/2021   Priority:  High   Target end date:  Indefinite    Indications    SLE (systemic lupus erythematosus) (H) (Resolved) [M32.9]  Long term current use of anticoagulant therapy [Z79.01]  Lupus erythematosus [L93.0]             Anticoagulation Episode Summary     INR check location:  Clinic Lab    Preferred lab:      Send INR reminders to:  [facility]    Comments:  Factor 2  goal range is 15-25%, 1/31/20 Not drawing INR  Ok to leave message on home [phone]   May leave messages with Rodriguez Santos  DO NOT GIVE RECORDS TO [employer]        Anticoagulation Care Providers     Provider Role Specialty Phone number    Joe Contreras MD Referring Internal Medicine [phone]            See the Encounter Report to view Anticoagulation Flowsheet and Dosing Calendar (Go to Encounters tab in chart review, and find the Anticoagulation Therapy Visit)    Left message for patient with results and dosing recommendations. Asked patient to call back to report any missed doses, falls, signs and symptoms of bleeding or clotting, any changes in health, medication, or  diet. Asked patient to call back with any questions or concerns.  Recommended Jeanmarie take 10 mg of warfarin daily to see if she gets a little more in the middle of her goal range.  Gwen Abel RN                  solids

## 2024-01-19 NOTE — PROGRESS NOTE ADULT - ASSESSMENT
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 Patient states that he has been having a dry cough for the past week, worsened over the past few days. Denies SOB with the cough, denies hemoptysis. Reports fevers at home to 101.5 with associated diaphoresis. Said that he has significant pinprick like b/l chest pain with his cough but denies any chest pain when not coughing or with ambulation. Also reports rhinorrhea and sore throat. Reports generalized weakness and poor appetite. States his daughter was visiting him over the week end last week and she was positive for COVID19. Patient tested positive for COVID19 2 days prior and was started on paxlovid as outpatient. Of note, family states that the patient has had worsening confusion at home over the past 6 months (becoming disoriented to time) but recently has been more confused, talking about seeing people that are not present.   On arrival to the ED his vitals were T 98 -> 99.2, P 80, /72, RR 18, ) sat 100% RA. His lab work showed leukocytosis with neutrophilia and bandemia, MABLE, mild hyponatremia, indeterminant but stable troponins, and elevated lactate to 2.3. His COVID19 swab was positive. His CXR showed bibasilar crackles.  (23 Dec 2023 22:51):  pt has been here for past two weeks and now pulm called fro excessive secretions   he is able to answer simple questions:  grand sons at bedside:     Covid / Resp failure/on 2 L of oxygen  :  CADS/P CABG  DM  CKD  HTN  CVA X 3    Covid / Resp failure/on 2 L of oxygen:  -he was treated for covid before:   -he has excessive secretions  -keep o2 sao2 above 90%  -add BD and mucomyst: ;  -add mucinex:   1/10: he seems to be doing  ok: cont mucomyst and bd   1/11 ?: add benzonatate and Robitussin prm : dc dornase  1/12: seems OK: no sob:  no cough : no phlegm : has gurgling sound in throat:  looks comfortable  1/14: he is on room air:  with good sao2:  finished covid rx:  cont current rx:  high risk for aspiration as he has gurgling secretions in throat   1/15: would do ct chest he seems to have increasing effusions:  his ct scan from last week of december:  has not much of effusions: however will try lasix : [facility]    1/16: doing  ok : no os:b has secretions still : change to percussion bed:  cont bd  1/17: seems stable:  no sob: on 3 L of oxygen : should add ATC lasix to me as he has formed new pleural effusions:  echo with mod AS   1/18; [facility] pts son : who is a neurologist:  she has secretions in chest with atelectasis and yovani effusions with increased secretions:  the son requests bronchoscopy:  I have explained the advantages and disadvantages of the bronch at this ago and he might not able able to be extubated soon after procedure:  but they want to go ahead with it: IP called   He will remain art risk for recurrent aspiration and atelectasis even after the procedure and they understand that    1/19: FOR BRONCH TODAY  : AGAIN CONFIRMED WITH NEUROLOGISTS SON  CADS/P CABG  -cont current medications   DM  monitor and control   CKD  -cont current meds   HTN   -controlled  CVA X 3  -doing ok :     dw family  and team  GI Bleed:   -secondary to Dieulafoy's lesion:  defer to primary team :    dvt prophylaxis    dwacp             Yes

## 2024-01-19 NOTE — PROGRESS NOTE ADULT - ASSESSMENT
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 CTA demonstrating mesenteric fat stranding associated with ascending/transverse colon. S/p SMA angiography with stent placement with diagnostic laparoscopy 12/24, small bowel and visible colon viable, some inflammation of omentum in RUQ. Course c/b GI bleed, s/p scope on 1/2 with GI with clips placed. Patient transferred overnight from SICU to the floor in clinically stable condition.       Problem/Recommendation - 1:  ·  Problem: Type 2 diabetes mellitus with hyperglycemia.   ·  Recommendation: sugars better now.   Continue Lantus and SSI.     Problem/Recommendation - 2:  ·  Problem: Acute renal failure superimposed on chronic kidney disease.   ·  Recommendation: Creatinine stable.      Problem/Recommendation - 3:  ·  Problem: CAD (coronary artery disease).   ·  Recommendation: S/P CABG and Stents. On DAPT and BB.  cardiology help appreciated.     Problem/Recommendation - 4:  ·  Problem: Essential hypertension.   ·  Recommendation: BP meds with hold parameters.     Problem/Recommendation - 5:  ·  Problem: GI bleed.   ·  Recommendation: On PPI.  S/P EGD.   Impression:          - NG tube removed before endoscopy                       - LA Grade B esophagitis.                       - Bleeding Dieulafoy's lesion of the posterior wall of                        the gastric body. This is likely the source of                        clinically significant bleeding. Hemostasis achieved                        using 2 MR conditional hemoclips.                       - Bleeding edematous mucosa and nonbleeding clean based                        gastric ulcers in the posterior wall of the gastric                        body. These are likely due to NG tube irritation.                        Hemostasis achieved using 1 MR conditional hemoclip.                       - Non-bleeding small clean based duodenal ulcer                       - No specimens collected.    < end of copied text >.     Problem/Recommendation - 6:  ·  Problem: Myoclonic jerking.   ·  Recommendation: On keppra.     Problem/Recommendation - 7:  ·  Problem: Dysphagia.   ·  Recommendation:  S/P  cineesophagogram .  Puree diet     Problem/Recommendation - 8:  ·  Problem: Abdominal distension.   ·  Recommendation: S/P  SMA angiography with stent placement with diagnostic laparoscopy 12/24,  Vascular following.     Problem/Recommendation - 9:  ·  Problem: Anemia due to acute blood loss.   ·  Recommendation: S/P PRBC.   HH stable.       Problem/Recommendation - 10:  ·  Problem: Toxic metabolic encephalopathy.   ·  Recommendation: Mental status waxes and wanes .  D/W Neurology attending and will see patient .   MRI pending.   < from: CT Head No Cont (01.16.24 @ 18:01) >    IMPRESSION:    No evidence of acute intracranial hemorrhage, midline shift or CT   evidence of acute territorial infarct.  Partial opacificationof the left middle ear and mastoid air cells.   Correlate clinically for otomastoiditis.  If the patient's symptoms persist, consider short interval follow-up head   CT or brain MRI if there are no MRI contraindications.    Will get ENT consult in AM.    Problem/Recommendation - 11:  ·  Problem: 2019 novel coronavirus disease (COVID-19).   ·  Recommendation: S/P Renmdisvir.     Problem/Recommendation - 12:  ·  Problem: Pleural effusion  with Leg edema    ·  Recommendation:  Diuretics PRN . ,  Doppler negative for  DVT.  On Gabapentin  < from: TTE W or WO Ultrasound Enhancing Agent (12.25.23 @ 11:09) >  CONCLUSIONS:      1. Technically difficult image quality.   2. Left ventricular cavity is normal. Left ventricular wallthickness is normal. Left ventricular systolic function is normal with an ejection fraction of 62 % by Florence's method of disks. There are no regional wall motion abnormalities seen.   3. Normal right ventricular cavity size and probably normal systolic function.   4. Structurally normal mitral valve with normal leaflet excursion. There is calcification of the mitral valve annulus. There is mild mitral regurgitation.   5. Aortic valve leaflet morphology not well visualized. with reduced systolicexcursion. There is calcification of the aortic valve leaflets. The peak transaortic velocity is 2.47 m/s, peak transaortic gradient is 24.4 mmHg and mean transaortic gradient is 14.0 mmHg with an LVOT/aortic valve VTI ratio of 0.20. Probable mild tomoderate aortic stenosis. There is mild aortic regurgitation.   6. No prior echocardiogram is available for comparison.    < end of copied text >     Problem/Recommendation - 13:  ·  Problem: Persistent Cough ? Aspiration Pneumonia with B/L pleural Effusion Cllapse of middle lobe.   ·  Recommendation:   Cough suppressants .  Pulmonary helping   S/P Bronchoscopy .    < from: CT Chest No Cont (01.16.24 @ 18:04) >  IMPRESSION:  Occluded right lower lobe bronchus with right lower lobar collapse.  Partially occluded right middle lobe bronchus with near complete right   middle lobar collapse.  Bilateral upper and left lower lobe nodular and groundglass opacities,   predominantly in the right upper lobe.  Moderate-sized bilateral pleural effusions.        D/W Sons in room.   Dispo : DC planning to [facility] .

## 2024-01-19 NOTE — CHART NOTE - NSCHARTNOTEFT_GEN_A_CORE
Patient s/p bronchoscopy earlier today. When patient returned to the floor, RRT was called. Patient desaturated on 3L, increased to 6L, patient's O2 sat still in the 80s. Patient had increased secretions, that were audible. House pulm immediately called and arrived at bedside. Suctioning attempted, however patient still desaturated. Patient placed on HFNC, O2 sat improved to 90s. Patient transferred to MICU for close airway monitoring and possible intubation. Attending Dr. Dinero made aware.

## 2024-01-19 NOTE — CHART NOTE - NSCHARTNOTEFT_GEN_A_CORE
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 Patient states that he has been having a dry cough for the past week, worsened over the past few days. Denies SOB with the cough, denies hemoptysis. Reports fevers at home to 101.5 with associated diaphoresis. Said that he has significant pinprick like b/l chest pain with his cough but denies any chest pain when not coughing or with ambulation. Also reports rhinorrhea and sore throat. Reports generalized weakness and poor appetite. States his daughter was visiting him over the week end last week and she was positive for COVID19. Patient tested positive for COVID19 2 days prior and was started on paxlovid as outpatient. Of note, family states that the patient has had worsening confusion at home over the past 6 months (becoming disoriented to time) but recently has been more confused, talking about seeing people that are not present.   On arrival to the ED his vitals were T 98 -> 99.2, P 80, /72, RR 18, ) sat 100% RA. His lab work showed leukocytosis with neutrophilia and bandemia, MABLE, mild hyponatremia, indeterminant but stable troponins, and elevated lactate to 2.3. His COVID19 swab was positive. His CXR showed bibasilar crackles.    Pt's hospital course complicated by SMA angiography w SMA calcification with stent placement with diagnostic laparoscopy 12/24, small bowel and visible colon viable, some inflammation of omentum in RUQ. Course c/b GI bleed upper endoscopy showing grade b esophagitis, bleeding dieulafoy's lesion, s/p scope on 1/2 with GI with clips placed. Pt received 1 dose of remdesivir for COVID +.       Pulmonology was consulted for patient's increasing secretions, initially treated supportively with Mucomyst, Mucinex, benzonatate and Robitussin, however with increasing secretions and new pleural effusions. IP discussed with patient's family/ son and given atelectasis and b/l effusions with increasing secretions- family opted for bronchoscopy. Pt was intubated for bronch however subsequently with increased secretions, desaturated and RRT called. Pt brought to MICU for closer airway monitoring.       PAST MEDICAL & SURGICAL HISTORY:  Hyperlipemia      Hypertension      Coronary Artery Disease      Diabetes Mellitus Type II      Stented Coronary Artery  total 5 stents, last stent 5/2019      Neuropathy      Myocardial infarction      Stroke  mild left facial numbness   no other residuals verbalized      Myoclonic jerking      Stage 3 chronic kidney disease      History of Cataract Extraction      Hx of CABG      H/O coronary angiogram      S/P coronary artery stent placement  1/6/09      S/P placement of cardiac pacemaker          FAMILY HISTORY:  No pertinent family history in first degree relatives        SOCIAL HISTORY:  Smoking:   Substance Use:   EtOH Use:   Advance Directives:     MEDICATIONS  (STANDING):  acetylcysteine 20%  Inhalation 4 milliLiter(s) Inhalation every 6 hours  albuterol/ipratropium for Nebulization 3 milliLiter(s) Nebulizer every 6 hours  amLODIPine   Tablet 5 milliGRAM(s) Oral daily  aspirin  chewable 81 milliGRAM(s) Oral daily  azithromycin  IVPB 500 milliGRAM(s) IV Intermittent once  azithromycin  IVPB      cefepime   IVPB      chlorhexidine 2% Cloths 1 Application(s) Topical daily  escitalopram 10 milliGRAM(s) Oral daily  furosemide    Tablet 20 milliGRAM(s) Oral daily  gabapentin Solution 100 milliGRAM(s) Oral two times a day  heparin   Injectable 5000 Unit(s) SubCutaneous every 8 hours  insulin glargine Injectable (LANTUS) 14 Unit(s) SubCutaneous at bedtime  insulin lispro (ADMELOG) corrective regimen sliding scale   SubCutaneous at bedtime  insulin lispro (ADMELOG) corrective regimen sliding scale   SubCutaneous three times a day before meals  levETIRAcetam   Injectable 250 milliGRAM(s) IV Push every 12 hours  melatonin 3 milliGRAM(s) Oral at bedtime  memantine 5 milliGRAM(s) Oral two times a day  metoprolol tartrate 25 milliGRAM(s) Oral two times a day  mometasone 110 MICROgram(s) Inhaler 2 Puff(s) Inhalation every 12 hours  mupirocin 2% Ointment 1 Application(s) Topical two times a day  pantoprazole  Injectable 40 milliGRAM(s) IV Push every 12 hours  ranolazine 500 milliGRAM(s) Oral two times a day  sodium chloride 3%  Inhalation 4 milliLiter(s) Inhalation every 6 hours  sucralfate 1 Gram(s) Oral every 12 hours  ticagrelor 60 milliGRAM(s) Oral every 12 hours    MEDICATIONS  (PRN):  acetaminophen     Tablet .. 650 milliGRAM(s) Oral every 6 hours PRN Temp greater or equal to 38C (100.4F), Mild Pain (1 - 3), Moderate Pain (4 - 6)  guaiFENesin Oral Liquid (Sugar-Free) 100 milliGRAM(s) Oral every 6 hours PRN Cough      Allergies    fluoroquinolone antibiotics (Other)  Cipro (Unknown)  Tegretol (Unknown)  carbamazepine (Other)    Intolerances        REVIEW OF SYSTEMS:  CONSTITUTIONAL: No weakness, fevers or chills  EYES/ENT: No visual changes;  No vertigo or throat pain   NECK: No pain or stiffness  RESPIRATORY: No cough, wheezing, hemoptysis; No shortness of breath  CARDIOVASCULAR: No chest pain or palpitations  GASTROINTESTINAL: No abdominal or epigastric pain. No nausea, vomiting, or hematemesis; No diarrhea or constipation. No melena or hematochezia.  GENITOURINARY: No dysuria, frequency or hematuria  NEUROLOGICAL: No numbness or weakness  SKIN: No itching, rashes  [ ] All other systems negative  [ ] Unable to assess ROS because ________    OBJECTIVE:  ICU Vital Signs Last 24 Hrs  T(C): 36.7 (19 Jan 2024 13:15), Max: 36.7 (18 Jan 2024 16:43)  T(F): 98 (19 Jan 2024 13:15), Max: 98 (18 Jan 2024 16:43)  HR: 80 (19 Jan 2024 13:15) (70 - 88)  BP: 126/54 (19 Jan 2024 13:15) (110/48 - 145/50)  BP(mean): 73 (19 Jan 2024 13:15) (65 - 109)  ABP: --  ABP(mean): --  RR: 19 (19 Jan 2024 13:15) (14 - 22)  SpO2: 94% (19 Jan 2024 13:15) (92% - 100%)    O2 Parameters below as of 19 Jan 2024 13:15  Patient On (Oxygen Delivery Method): nasal cannula  O2 Flow (L/min): 3            01-18 @ 07:01 - 01-19 @ 07:00  --------------------------------------------------------  IN: 580 mL / OUT: 450 mL / NET: 130 mL    01-19 @ 07:01 - 01-19 @ 16:08  --------------------------------------------------------  IN: 0 mL / OUT: 0 mL / NET: 0 mL      CAPILLARY BLOOD GLUCOSE      POCT Blood Glucose.: 130 mg/dL (19 Jan 2024 14:47)      PHYSICAL EXAM:  GENERAL: NAD, lying in bed comfortably  HEAD:  Atraumatic, normocephalic  EYES: EOMI, PERRLA, conjunctiva and sclera clear  ENT: Moist mucous membranes  NECK: Supple, no JVD  HEART: S1, S2, regular rate and rhythm, no murmurs, rubs, or gallops  LUNGS: Unlabored respirations.  Clear to auscultation bilaterally, no crackles, wheezing, or rhonchi  ABDOMEN: Soft, nontender, nondistended, +BS  EXTREMITIES: 2+ peripheral pulses bilaterally. No clubbing, cyanosis, or edema  NERVOUS SYSTEM:  A&Ox3, no focal deficits   SKIN: No rashes or lesions  LINES:     LABS:                        8.9    8.25  )-----------( 284      ( 19 Jan 2024 06:16 )             27.7     Hgb Trend: 8.9<--, 8.9<--, 9.0<--, 9.6<--  01-19    142  |  101  |  14  ----------------------------<  60<L>  3.9   |  29  |  1.54<H>    Ca    8.4      19 Jan 2024 06:16  Phos  3.0     01-19  Mg     1.80     01-19      Creatinine Trend: 1.54<--, 1.55<--, 1.57<--, 1.58<--, 1.58<--, 1.48<--  PT/INR - ( 19 Jan 2024 06:16 )   PT: 12.6 sec;   INR: 1.12 ratio         PTT - ( 19 Jan 2024 06:16 )  PTT:34.0 sec  Urinalysis Basic - ( 19 Jan 2024 06:16 )    Color: x / Appearance: x / SG: x / pH: x  Gluc: 60 mg/dL / Ketone: x  / Bili: x / Urobili: x   Blood: x / Protein: x / Nitrite: x   Leuk Esterase: x / RBC: x / WBC x   Sq Epi: x / Non Sq Epi: x / Bacteria: x      Arterial Blood Gas:  01-19 @ 15:25  7.31/59/47/30/64.2/2.5  ABG lactate: --        MICROBIOLOGY:     RADIOLOGY & ADDITIONAL TESTS:    ASSESSMENT  WALTER DONOHUE is a 89y male with PMH of CAD s/p CABG s/p stents (last stent May 2022), s/p PPM, DM2, CKD (baseline Cr 1.2-1.3 as per family), PVD, HTN, HLD, CVA x3 (without residual deficits), and 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     PLAN  =====Neurologic=====  #CVA  - prior CVA x3 with no residual deficits  #myoclonic jerks  - on keppra will continue, on lexapro and memantine     =====Pulmonary=====  #COVID  - s/p paxlovid and 1 dose remdesivir while inpatient  #Pleural effusions b/l  - CT chest from 1/16 showing new small bilateral pleural effusions with associated complete collapse  of the right middle and right lower lobes and partial atelectasis of the  right upper and left lower lobes. patchy bilateral ground-glass opacities are consistent with pulmonary edema. layering secretions in the trachea, left mainstem bronchus, and right lower lobe bronchus  - currently on cefepime and azithro for empiric PNA coverage  - s/p bronch for increasing secretions- on mucomyst, hypertonic saline, albuterol, guanefisin, mometasone will continue     =====Cardiovascular=====  Patient does not currently require vasopressors and is normotensive  #HTN  - on home amlodipine     =====GI=====  #GERD  - Protonix 40mg IV QD    #Diet  - Full diet    =====Renal/=====  #Electrolytes  - Maintain K>4, Phos>3, Mag>2, iCal>1    =====Endocrine=====  #DM2  - While NPO, FSBG and JR q6h    =====Infectious Disease=====  Patient is afebrile and is not currently being treated for any infection.    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 Patient states that he has been having a dry cough for the past week, worsened over the past few days. Denies SOB with the cough, denies hemoptysis. Reports fevers at home to 101.5 with associated diaphoresis. Said that he has significant pinprick like b/l chest pain with his cough but denies any chest pain when not coughing or with ambulation. Also reports rhinorrhea and sore throat. Reports generalized weakness and poor appetite. States his daughter was visiting him over the week end last week and she was positive for COVID19. Patient tested positive for COVID19 2 days prior and was started on paxlovid as outpatient. Of note, family states that the patient has had worsening confusion at home over the past 6 months (becoming disoriented to time) but recently has been more confused, talking about seeing people that are not present.   On arrival to the ED his vitals were T 98 -> 99.2, P 80, /72, RR 18, ) sat 100% RA. His lab work showed leukocytosis with neutrophilia and bandemia, MABLE, mild hyponatremia, indeterminant but stable troponins, and elevated lactate to 2.3. His COVID19 swab was positive. His CXR showed bibasilar crackles.    Pt's hospital course complicated by SMA angiography w SMA calcification with stent placement with diagnostic laparoscopy 12/24, small bowel and visible colon viable, some inflammation of omentum in RUQ. Course c/b GI bleed upper endoscopy showing grade b esophagitis, bleeding dieulafoy's lesion, s/p scope on 1/2 with GI with clips placed. Pt received 1 dose of remdesivir for COVID +.       Pulmonology was consulted for patient's increasing secretions, initially treated supportively with Mucomyst, Mucinex, benzonatate and Robitussin, however with increasing secretions and new pleural effusions. IP discussed with patient's family/ son and given atelectasis and b/l effusions with increasing secretions- family opted for bronchoscopy. Pt was intubated for bronch however subsequently with increased secretions, desaturated and RRT called. Pt brought to MICU for closer airway monitoring.       PAST MEDICAL & SURGICAL HISTORY:  Hyperlipemia      Hypertension      Coronary Artery Disease      Diabetes Mellitus Type II      Stented Coronary Artery  total 5 stents, last stent 5/2019      Neuropathy      Myocardial infarction      Stroke  mild left facial numbness   no other residuals verbalized      Myoclonic jerking      Stage 3 chronic kidney disease      History of Cataract Extraction      Hx of CABG      H/O coronary angiogram      S/P coronary artery stent placement  1/6/09      S/P placement of cardiac pacemaker          FAMILY HISTORY:  No pertinent family history in first degree relatives        SOCIAL HISTORY:  Smoking:   Substance Use:   EtOH Use:   Advance Directives:     MEDICATIONS  (STANDING):  acetylcysteine 20%  Inhalation 4 milliLiter(s) Inhalation every 6 hours  albuterol/ipratropium for Nebulization 3 milliLiter(s) Nebulizer every 6 hours  amLODIPine   Tablet 5 milliGRAM(s) Oral daily  aspirin  chewable 81 milliGRAM(s) Oral daily  azithromycin  IVPB 500 milliGRAM(s) IV Intermittent once  azithromycin  IVPB      cefepime   IVPB      chlorhexidine 2% Cloths 1 Application(s) Topical daily  escitalopram 10 milliGRAM(s) Oral daily  furosemide    Tablet 20 milliGRAM(s) Oral daily  gabapentin Solution 100 milliGRAM(s) Oral two times a day  heparin   Injectable 5000 Unit(s) SubCutaneous every 8 hours  insulin glargine Injectable (LANTUS) 14 Unit(s) SubCutaneous at bedtime  insulin lispro (ADMELOG) corrective regimen sliding scale   SubCutaneous at bedtime  insulin lispro (ADMELOG) corrective regimen sliding scale   SubCutaneous three times a day before meals  levETIRAcetam   Injectable 250 milliGRAM(s) IV Push every 12 hours  melatonin 3 milliGRAM(s) Oral at bedtime  memantine 5 milliGRAM(s) Oral two times a day  metoprolol tartrate 25 milliGRAM(s) Oral two times a day  mometasone 110 MICROgram(s) Inhaler 2 Puff(s) Inhalation every 12 hours  mupirocin 2% Ointment 1 Application(s) Topical two times a day  pantoprazole  Injectable 40 milliGRAM(s) IV Push every 12 hours  ranolazine 500 milliGRAM(s) Oral two times a day  sodium chloride 3%  Inhalation 4 milliLiter(s) Inhalation every 6 hours  sucralfate 1 Gram(s) Oral every 12 hours  ticagrelor 60 milliGRAM(s) Oral every 12 hours    MEDICATIONS  (PRN):  acetaminophen     Tablet .. 650 milliGRAM(s) Oral every 6 hours PRN Temp greater or equal to 38C (100.4F), Mild Pain (1 - 3), Moderate Pain (4 - 6)  guaiFENesin Oral Liquid (Sugar-Free) 100 milliGRAM(s) Oral every 6 hours PRN Cough      Allergies    fluoroquinolone antibiotics (Other)  Cipro (Unknown)  Tegretol (Unknown)  carbamazepine (Other)    Intolerances        REVIEW OF SYSTEMS:  CONSTITUTIONAL: No weakness, fevers or chills  EYES/ENT: No visual changes;  No vertigo or throat pain   NECK: No pain or stiffness  RESPIRATORY: No cough, wheezing, hemoptysis; No shortness of breath  CARDIOVASCULAR: No chest pain or palpitations  GASTROINTESTINAL: No abdominal or epigastric pain. No nausea, vomiting, or hematemesis; No diarrhea or constipation. No melena or hematochezia.  GENITOURINARY: No dysuria, frequency or hematuria  NEUROLOGICAL: No numbness or weakness  SKIN: No itching, rashes  [ ] All other systems negative  [ ] Unable to assess ROS because ________    OBJECTIVE:  ICU Vital Signs Last 24 Hrs  T(C): 36.7 (19 Jan 2024 13:15), Max: 36.7 (18 Jan 2024 16:43)  T(F): 98 (19 Jan 2024 13:15), Max: 98 (18 Jan 2024 16:43)  HR: 80 (19 Jan 2024 13:15) (70 - 88)  BP: 126/54 (19 Jan 2024 13:15) (110/48 - 145/50)  BP(mean): 73 (19 Jan 2024 13:15) (65 - 109)  ABP: --  ABP(mean): --  RR: 19 (19 Jan 2024 13:15) (14 - 22)  SpO2: 94% (19 Jan 2024 13:15) (92% - 100%)    O2 Parameters below as of 19 Jan 2024 13:15  Patient On (Oxygen Delivery Method): nasal cannula  O2 Flow (L/min): 3            01-18 @ 07:01 - 01-19 @ 07:00  --------------------------------------------------------  IN: 580 mL / OUT: 450 mL / NET: 130 mL    01-19 @ 07:01 - 01-19 @ 16:08  --------------------------------------------------------  IN: 0 mL / OUT: 0 mL / NET: 0 mL      CAPILLARY BLOOD GLUCOSE      POCT Blood Glucose.: 130 mg/dL (19 Jan 2024 14:47)      PHYSICAL EXAM:  GENERAL: NAD, lying in bed comfortably  HEAD:  Atraumatic, normocephalic  EYES: EOMI, PERRLA, conjunctiva and sclera clear  ENT: Moist mucous membranes  NECK: Supple, no JVD  HEART: S1, S2, regular rate and rhythm, no murmurs, rubs, or gallops  LUNGS: Unlabored respirations.  Clear to auscultation bilaterally, no crackles, wheezing, or rhonchi  ABDOMEN: Soft, nontender, nondistended, +BS  EXTREMITIES: 2+ peripheral pulses bilaterally. No clubbing, cyanosis, or edema  NERVOUS SYSTEM:  A&Ox3, no focal deficits   SKIN: No rashes or lesions  LINES:     LABS:                        8.9    8.25  )-----------( 284      ( 19 Jan 2024 06:16 )             27.7     Hgb Trend: 8.9<--, 8.9<--, 9.0<--, 9.6<--  01-19    142  |  101  |  14  ----------------------------<  60<L>  3.9   |  29  |  1.54<H>    Ca    8.4      19 Jan 2024 06:16  Phos  3.0     01-19  Mg     1.80     01-19      Creatinine Trend: 1.54<--, 1.55<--, 1.57<--, 1.58<--, 1.58<--, 1.48<--  PT/INR - ( 19 Jan 2024 06:16 )   PT: 12.6 sec;   INR: 1.12 ratio         PTT - ( 19 Jan 2024 06:16 )  PTT:34.0 sec  Urinalysis Basic - ( 19 Jan 2024 06:16 )    Color: x / Appearance: x / SG: x / pH: x  Gluc: 60 mg/dL / Ketone: x  / Bili: x / Urobili: x   Blood: x / Protein: x / Nitrite: x   Leuk Esterase: x / RBC: x / WBC x   Sq Epi: x / Non Sq Epi: x / Bacteria: x      Arterial Blood Gas:  01-19 @ 15:25  7.31/59/47/30/64.2/2.5  ABG lactate: --        MICROBIOLOGY:     RADIOLOGY & ADDITIONAL TESTS:    ASSESSMENT  WALTER DONOHUE is a 89y male with PMH of CAD s/p CABG s/p stents (last stent May 2022), s/p PPM, DM2, CKD (baseline Cr 1.2-1.3 as per family), PVD, HTN, HLD, CVA x3 (without residual deficits), and 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     PLAN  =====Neurologic=====  #CVA  - prior CVA x3 with no residual deficits  #myoclonic jerks  - on Keppra will continue, on Lexapro and memantine     =====Pulmonary=====  #COVID  - s/p paxlovid and 1 dose remdesivir while inpatient  #Pleural effusions b/l  - CT chest from 1/16 showing new small bilateral pleural effusions with associated complete collapse  of the right middle and right lower lobes and partial atelectasis of the  right upper and left lower lobes. patchy bilateral ground-glass opacities are consistent with pulmonary edema. layering secretions in the trachea, left mainstem bronchus, and right lower lobe bronchus  - currently on cefepime and azithro for empiric PNA coverage  - s/p bronch for increasing secretions- on Mucomyst, hypertonic saline, albuterol, guaifenesin, mometasone will continue     =====Cardiovascular=====  Patient does not currently require vasopressors and is normotensive  #HTN  - on home amlodipine and metoprolol, will continue here    #CAD  - on Brilinta aspirin and ranolazine continue     =====GI=====  #upper GI bleed  - s/p EGD showing dieulafoy lesion and esophagitis likely 2/2 NGT irritation s/p 2 clips  - on protonix IV BID continue     #Diet  - DASH diet if tolerates     =====Renal/=====  #Electrolytes  - Maintain K>4, Phos>3, Mag>2, iCal>1  - baseline cr 1.5, at baseline now      =====Endocrine=====  #DM2  - on lantus 14units and SSI  - a1c 9.3, glucose wnl inpatient     =====Infectious Disease=====  #COVID   - s/p paxlovid and 1 dose remdesivir  # PNA  - CT chest showing ground glass opacities  - treatment with azithro and cefepime currently  - f/u urine legionella  - no white count or fever thus far  - f/u bronchial cultures     =====Heme/Onc=====  #DVT Ppx  - Lovenox 40mg SQ qd    =====Ethics=====  FULL CODE MICU ACCEPT NOTE    CHIEF COMPLAINT: Patient is a 89y old  Male who presents with a chief complaint of COVID19, Chest pain (19 Jan 2024 15:20)      89 y/o M w history of CAD s/p CABG s/p stents (last stent May 2022), s/p PPM, DM2, CKD (baseline Cr 1.2-1.3 as per family), PVD, HTN, HLD, CVA x3 (without residual deficits), and 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 Patient states that he has been having a dry cough for the past week, worsened over the past few days. Denies SOB with the cough, denies hemoptysis. Reports fevers at home to 101.5 with associated diaphoresis. Said that he has significant pinprick like b/l chest pain with his cough but denies any chest pain when not coughing or with ambulation. Also reports rhinorrhea and sore throat. Reports generalized weakness and poor appetite. States his daughter was visiting him over the week end last week and she was positive for COVID19. Patient tested positive for COVID19 2 days prior and was started on paxlovid as outpatient. Of note, family states that the patient has had worsening confusion at home over the past 6 months (becoming disoriented to time) but recently has been more confused, talking about seeing people that are not present.   On arrival to the ED his vitals were T 98 -> 99.2, P 80, /72, RR 18, ) sat 100% RA. His lab work showed leukocytosis with neutrophilia and bandemia, MABLE, mild hyponatremia, indeterminant but stable troponin, and elevated lactate to 2.3. His COVID19 swab was positive. His CXR showed bibasilar crackles.    Pt's hospital course complicated by SMA angiography w SMA calcification with stent placement with diagnostic laparoscopy 12/24, small bowel and visible colon viable, some inflammation of omentum in RUQ. Course c/b GI bleed upper endoscopy showing grade b esophagitis, bleeding dieulafoy's lesion, s/p scope on 1/2 with GI with clips placed. Pt received 1 dose of remdesivir for COVID +.       Pulmonology was consulted for patient's increasing secretions, initially treated supportively with Mucomyst, Mucinex, benzonatate and Robitussin, however with increasing secretions and new pleural effusions. IP discussed with patient's family/ son and given atelectasis and b/l effusions with increasing secretions- family opted for bronchoscopy. Pt was intubated for bronch however subsequently with increased secretions, desaturated and RRT called. Pt brought to MICU for closer airway monitoring.       PAST MEDICAL & SURGICAL HISTORY:  Hyperlipemia      Hypertension      Coronary Artery Disease      Diabetes Mellitus Type II      Stented Coronary Artery  total 5 stents, last stent 5/2019      Neuropathy      Myocardial infarction      Stroke  mild left facial numbness   no other residuals verbalized      Myoclonic jerking      Stage 3 chronic kidney disease      History of Cataract Extraction      Hx of CABG      H/O coronary angiogram      S/P coronary artery stent placement  1/6/09      S/P placement of cardiac pacemaker          FAMILY HISTORY:  No pertinent family history in first degree relatives        SOCIAL HISTORY:  Smoking:   Substance Use:   EtOH Use:   Advance Directives:     MEDICATIONS  (STANDING):  acetylcysteine 20%  Inhalation 4 milliLiter(s) Inhalation every 6 hours  albuterol/ipratropium for Nebulization 3 milliLiter(s) Nebulizer every 6 hours  amLODIPine   Tablet 5 milliGRAM(s) Oral daily  aspirin  chewable 81 milliGRAM(s) Oral daily  azithromycin  IVPB 500 milliGRAM(s) IV Intermittent once  azithromycin  IVPB      cefepime   IVPB      chlorhexidine 2% Cloths 1 Application(s) Topical daily  escitalopram 10 milliGRAM(s) Oral daily  furosemide    Tablet 20 milliGRAM(s) Oral daily  gabapentin Solution 100 milliGRAM(s) Oral two times a day  heparin   Injectable 5000 Unit(s) SubCutaneous every 8 hours  insulin glargine Injectable (LANTUS) 14 Unit(s) SubCutaneous at bedtime  insulin lispro (ADMELOG) corrective regimen sliding scale   SubCutaneous at bedtime  insulin lispro (ADMELOG) corrective regimen sliding scale   SubCutaneous three times a day before meals  levETIRAcetam   Injectable 250 milliGRAM(s) IV Push every 12 hours  melatonin 3 milliGRAM(s) Oral at bedtime  memantine 5 milliGRAM(s) Oral two times a day  metoprolol tartrate 25 milliGRAM(s) Oral two times a day  mometasone 110 MICROgram(s) Inhaler 2 Puff(s) Inhalation every 12 hours  mupirocin 2% Ointment 1 Application(s) Topical two times a day  pantoprazole  Injectable 40 milliGRAM(s) IV Push every 12 hours  ranolazine 500 milliGRAM(s) Oral two times a day  sodium chloride 3%  Inhalation 4 milliLiter(s) Inhalation every 6 hours  sucralfate 1 Gram(s) Oral every 12 hours  ticagrelor 60 milliGRAM(s) Oral every 12 hours    MEDICATIONS  (PRN):  acetaminophen     Tablet .. 650 milliGRAM(s) Oral every 6 hours PRN Temp greater or equal to 38C (100.4F), Mild Pain (1 - 3), Moderate Pain (4 - 6)  guaiFENesin Oral Liquid (Sugar-Free) 100 milliGRAM(s) Oral every 6 hours PRN Cough      Allergies    fluoroquinolone antibiotics (Other)  Cipro (Unknown)  Tegretol (Unknown)  carbamazepine (Other)    Intolerances        REVIEW OF SYSTEMS:  CONSTITUTIONAL: No weakness, fevers or chills  EYES/ENT: No visual changes;  No vertigo or throat pain   NECK: No pain or stiffness  RESPIRATORY: No cough, wheezing, hemoptysis; No shortness of breath  CARDIOVASCULAR: No chest pain or palpitations  GASTROINTESTINAL: No abdominal or epigastric pain. No nausea, vomiting, or hematemesis; No diarrhea or constipation. No melena or hematochezia.  GENITOURINARY: No dysuria, frequency or hematuria  NEUROLOGICAL: No numbness or weakness  SKIN: No itching, rashes  [ ] All other systems negative  [ ] Unable to assess ROS because ________    OBJECTIVE:  ICU Vital Signs Last 24 Hrs  T(C): 36.7 (19 Jan 2024 13:15), Max: 36.7 (18 Jan 2024 16:43)  T(F): 98 (19 Jan 2024 13:15), Max: 98 (18 Jan 2024 16:43)  HR: 80 (19 Jan 2024 13:15) (70 - 88)  BP: 126/54 (19 Jan 2024 13:15) (110/48 - 145/50)  BP(mean): 73 (19 Jan 2024 13:15) (65 - 109)  ABP: --  ABP(mean): --  RR: 19 (19 Jan 2024 13:15) (14 - 22)  SpO2: 94% (19 Jan 2024 13:15) (92% - 100%)    O2 Parameters below as of 19 Jan 2024 13:15  Patient On (Oxygen Delivery Method): nasal cannula  O2 Flow (L/min): 3            01-18 @ 07:01 - 01-19 @ 07:00  --------------------------------------------------------  IN: 580 mL / OUT: 450 mL / NET: 130 mL    01-19 @ 07:01 - 01-19 @ 16:08  --------------------------------------------------------  IN: 0 mL / OUT: 0 mL / NET: 0 mL      CAPILLARY BLOOD GLUCOSE      POCT Blood Glucose.: 130 mg/dL (19 Jan 2024 14:47)      PHYSICAL EXAM:  GENERAL: NAD, lying in bed comfortably  HEAD:  Atraumatic, normocephalic  EYES: EOMI, PERRLA, conjunctiva and sclera clear  ENT: Moist mucous membranes  NECK: Supple, no JVD  HEART: S1, S2, regular rate and rhythm, no murmurs, rubs, or gallops  LUNGS: Unlabored respirations.  Clear to auscultation bilaterally, no crackles, wheezing, or rhonchi  ABDOMEN: Soft, nontender, nondistended, +BS  EXTREMITIES: 2+ peripheral pulses bilaterally. No clubbing, cyanosis, or edema  NERVOUS SYSTEM:  A&Ox3, no focal deficits   SKIN: No rashes or lesions  LINES:     LABS:                        8.9    8.25  )-----------( 284      ( 19 Jan 2024 06:16 )             27.7     Hgb Trend: 8.9<--, 8.9<--, 9.0<--, 9.6<--  01-19    142  |  101  |  14  ----------------------------<  60<L>  3.9   |  29  |  1.54<H>    Ca    8.4      19 Jan 2024 06:16  Phos  3.0     01-19  Mg     1.80     01-19      Creatinine Trend: 1.54<--, 1.55<--, 1.57<--, 1.58<--, 1.58<--, 1.48<--  PT/INR - ( 19 Jan 2024 06:16 )   PT: 12.6 sec;   INR: 1.12 ratio         PTT - ( 19 Jan 2024 06:16 )  PTT:34.0 sec  Urinalysis Basic - ( 19 Jan 2024 06:16 )    Color: x / Appearance: x / SG: x / pH: x  Gluc: 60 mg/dL / Ketone: x  / Bili: x / Urobili: x   Blood: x / Protein: x / Nitrite: x   Leuk Esterase: x / RBC: x / WBC x   Sq Epi: x / Non Sq Epi: x / Bacteria: x      Arterial Blood Gas:  01-19 @ 15:25  7.31/59/47/30/64.2/2.5  ABG lactate: --        MICROBIOLOGY:     RADIOLOGY & ADDITIONAL TESTS:    ASSESSMENT  WALTER DONOHUE is a 89y male with PMH of CAD s/p CABG s/p stents (last stent May 2022), s/p PPM, DM2, CKD (baseline Cr 1.2-1.3 as per family), PVD, HTN, HLD, CVA x3 (without residual deficits), and 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     PLAN  =====Neurologic=====  #CVA  - prior CVA x3 with no residual deficits  #myoclonic jerks  - on Keppra will continue, on Lexapro and memantine     =====Pulmonary=====  #COVID  - s/p paxlovid and 1 dose remdesivir while inpatient  #Pleural effusions b/l  - CT chest from 1/16 showing new small bilateral pleural effusions with associated complete collapse  of the right middle and right lower lobes and partial atelectasis of the  right upper and left lower lobes. patchy bilateral ground-glass opacities are consistent with pulmonary edema. layering secretions in the trachea, left mainstem bronchus, and right lower lobe bronchus  - currently on cefepime and azithro for empiric PNA coverage  - s/p bronch for increasing secretions- on Mucomyst, hypertonic saline, albuterol, guaifenesin, mometasone will continue   - currently on high-flow 50% at 100L     =====Cardiovascular=====  Patient does not currently require vasopressors and is normotensive  #HTN  - on home amlodipine and metoprolol, will continue here    #CAD  - on Brilinta aspirin and ranolazine continue     =====GI=====  #upper GI bleed  - s/p EGD showing dieulafoy lesion and esophagitis likely 2/2 NGT irritation s/p 2 clips  - on protonix IV BID continue     #Diet  - DASH diet if tolerates     =====Renal/=====  #Electrolytes  - Maintain K>4, Phos>3, Mag>2, iCal>1  - baseline cr 1.5, at baseline now      =====Endocrine=====  #DM2  - on lantus 14units and SSI  - a1c 9.3, glucose wnl inpatient     =====Infectious Disease=====  #COVID   - s/p paxlovid and 1 dose remdesivir  # PNA  - CT chest showing ground glass opacities  - treatment with azithro and cefepime currently  - f/u urine legionella  - no white count or fever thus far  - f/u bronchial cultures     =====Heme/Onc=====  #DVT Ppx  - Lovenox 40mg SQ qd    =====Ethics=====  FULL CODE MICU ACCEPT NOTE    CHIEF COMPLAINT: Patient is a 89y old  Male who presents with a chief complaint of COVID19, Chest pain (19 Jan 2024 15:20)      89 y/o M w history of CAD s/p CABG s/p stents (last stent May 2022), s/p PPM, DM2, CKD (baseline Cr 1.2-1.3 as per family), PVD, HTN, HLD, CVA x3 (without residual deficits), and 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 Patient states that he has been having a dry cough for the past week, worsened over the past few days. Denies SOB with the cough, denies hemoptysis. Reports fevers at home to 101.5 with associated diaphoresis. Said that he has significant pinprick like b/l chest pain with his cough but denies any chest pain when not coughing or with ambulation. Also reports rhinorrhea and sore throat. Reports generalized weakness and poor appetite. States his daughter was visiting him over the week end last week and she was positive for COVID19. Patient tested positive for COVID19 2 days prior and was started on paxlovid as outpatient. Of note, family states that the patient has had worsening confusion at home over the past 6 months (becoming disoriented to time) but recently has been more confused, talking about seeing people that are not present.   On arrival to the ED his vitals were T 98 -> 99.2, P 80, /72, RR 18, ) sat 100% RA. His lab work showed leukocytosis with neutrophilia and bandemia, MABLE, mild hyponatremia, indeterminant but stable troponin, and elevated lactate to 2.3. His COVID19 swab was positive. His CXR showed bibasilar crackles.    Pt's hospital course complicated by SMA angiography w SMA calcification with stent placement with diagnostic laparoscopy 12/24, small bowel and visible colon viable, some inflammation of omentum in RUQ. Course c/b GI bleed upper endoscopy showing grade b esophagitis, bleeding Dieulafoy's lesion, s/p scope on 1/2 with GI with clips placed. Pt received 1 dose of remdesivir for COVID +.       Pulmonology was consulted for patient's increasing secretions, initially treated supportively with Mucomyst, Mucinex, benzonatate and Robitussin, however with increasing secretions and new pleural effusions. IP discussed with patient's family/ son and given atelectasis and b/l effusions with increasing secretions- family opted for bronchoscopy. Pt was intubated for bronch however subsequently with increased secretions, desaturated and RRT called, patient placed on high flow and brought to MICU for closer airway monitoring/ possible intubation.       PAST MEDICAL & SURGICAL HISTORY:  Hyperlipemia      Hypertension      Coronary Artery Disease      Diabetes Mellitus Type II      Stented Coronary Artery  total 5 stents, last stent 5/2019      Neuropathy      Myocardial infarction      Stroke  mild left facial numbness   no other residuals verbalized      Myoclonic jerking      Stage 3 chronic kidney disease      History of Cataract Extraction      Hx of CABG      H/O coronary angiogram      S/P coronary artery stent placement  1/6/09      S/P placement of cardiac pacemaker          FAMILY HISTORY:  No pertinent family history in first degree relatives        SOCIAL HISTORY:  Smoking:   Substance Use:   EtOH Use:   Advance Directives:     MEDICATIONS  (STANDING):  acetylcysteine 20%  Inhalation 4 milliLiter(s) Inhalation every 6 hours  albuterol/ipratropium for Nebulization 3 milliLiter(s) Nebulizer every 6 hours  amLODIPine   Tablet 5 milliGRAM(s) Oral daily  aspirin  chewable 81 milliGRAM(s) Oral daily  azithromycin  IVPB 500 milliGRAM(s) IV Intermittent once  azithromycin  IVPB      cefepime   IVPB      chlorhexidine 2% Cloths 1 Application(s) Topical daily  escitalopram 10 milliGRAM(s) Oral daily  furosemide    Tablet 20 milliGRAM(s) Oral daily  gabapentin Solution 100 milliGRAM(s) Oral two times a day  heparin   Injectable 5000 Unit(s) SubCutaneous every 8 hours  insulin glargine Injectable (LANTUS) 14 Unit(s) SubCutaneous at bedtime  insulin lispro (ADMELOG) corrective regimen sliding scale   SubCutaneous at bedtime  insulin lispro (ADMELOG) corrective regimen sliding scale   SubCutaneous three times a day before meals  levETIRAcetam   Injectable 250 milliGRAM(s) IV Push every 12 hours  melatonin 3 milliGRAM(s) Oral at bedtime  memantine 5 milliGRAM(s) Oral two times a day  metoprolol tartrate 25 milliGRAM(s) Oral two times a day  mometasone 110 MICROgram(s) Inhaler 2 Puff(s) Inhalation every 12 hours  mupirocin 2% Ointment 1 Application(s) Topical two times a day  pantoprazole  Injectable 40 milliGRAM(s) IV Push every 12 hours  ranolazine 500 milliGRAM(s) Oral two times a day  sodium chloride 3%  Inhalation 4 milliLiter(s) Inhalation every 6 hours  sucralfate 1 Gram(s) Oral every 12 hours  ticagrelor 60 milliGRAM(s) Oral every 12 hours    MEDICATIONS  (PRN):  acetaminophen     Tablet .. 650 milliGRAM(s) Oral every 6 hours PRN Temp greater or equal to 38C (100.4F), Mild Pain (1 - 3), Moderate Pain (4 - 6)  guaiFENesin Oral Liquid (Sugar-Free) 100 milliGRAM(s) Oral every 6 hours PRN Cough      Allergies    fluoroquinolone antibiotics (Other)  Cipro (Unknown)  Tegretol (Unknown)  carbamazepine (Other)    Intolerances        REVIEW OF SYSTEMS:  CONSTITUTIONAL: No weakness, fevers or chills  EYES/ENT: No visual changes;  No vertigo or throat pain   NECK: No pain or stiffness  RESPIRATORY: No cough, wheezing, hemoptysis; No shortness of breath  CARDIOVASCULAR: No chest pain or palpitations  GASTROINTESTINAL: No abdominal or epigastric pain. No nausea, vomiting, or hematemesis; No diarrhea or constipation. No melena or hematochezia.  GENITOURINARY: No dysuria, frequency or hematuria  NEUROLOGICAL: No numbness or weakness  SKIN: No itching, rashes  [ ] All other systems negative  [ ] Unable to assess ROS because ________    OBJECTIVE:  ICU Vital Signs Last 24 Hrs  T(C): 36.7 (19 Jan 2024 13:15), Max: 36.7 (18 Jan 2024 16:43)  T(F): 98 (19 Jan 2024 13:15), Max: 98 (18 Jan 2024 16:43)  HR: 80 (19 Jan 2024 13:15) (70 - 88)  BP: 126/54 (19 Jan 2024 13:15) (110/48 - 145/50)  BP(mean): 73 (19 Jan 2024 13:15) (65 - 109)  ABP: --  ABP(mean): --  RR: 19 (19 Jan 2024 13:15) (14 - 22)  SpO2: 94% (19 Jan 2024 13:15) (92% - 100%)    O2 Parameters below as of 19 Jan 2024 13:15  Patient On (Oxygen Delivery Method): nasal cannula  O2 Flow (L/min): 3            01-18 @ 07:01 - 01-19 @ 07:00  --------------------------------------------------------  IN: 580 mL / OUT: 450 mL / NET: 130 mL    01-19 @ 07:01 - 01-19 @ 16:08  --------------------------------------------------------  IN: 0 mL / OUT: 0 mL / NET: 0 mL      CAPILLARY BLOOD GLUCOSE      POCT Blood Glucose.: 130 mg/dL (19 Jan 2024 14:47)      PHYSICAL EXAM:  GENERAL: NAD, lying in bed comfortably  HEAD:  Atraumatic, normocephalic  EYES: EOMI, PERRLA, conjunctiva and sclera clear  ENT: Moist mucous membranes  NECK: Supple, no JVD  HEART: S1, S2, regular rate and rhythm, no murmurs, rubs, or gallops  LUNGS: Unlabored respirations.  Clear to auscultation bilaterally, no crackles, wheezing, or rhonchi  ABDOMEN: Soft, nontender, nondistended, +BS  EXTREMITIES: 2+ peripheral pulses bilaterally. No clubbing, cyanosis, or edema  NERVOUS SYSTEM:  A&Ox3, no focal deficits   SKIN: No rashes or lesions  LINES:     LABS:                        8.9    8.25  )-----------( 284      ( 19 Jan 2024 06:16 )             27.7     Hgb Trend: 8.9<--, 8.9<--, 9.0<--, 9.6<--  01-19    142  |  101  |  14  ----------------------------<  60<L>  3.9   |  29  |  1.54<H>    Ca    8.4      19 Jan 2024 06:16  Phos  3.0     01-19  Mg     1.80     01-19      Creatinine Trend: 1.54<--, 1.55<--, 1.57<--, 1.58<--, 1.58<--, 1.48<--  PT/INR - ( 19 Jan 2024 06:16 )   PT: 12.6 sec;   INR: 1.12 ratio         PTT - ( 19 Jan 2024 06:16 )  PTT:34.0 sec  Urinalysis Basic - ( 19 Jan 2024 06:16 )    Color: x / Appearance: x / SG: x / pH: x  Gluc: 60 mg/dL / Ketone: x  / Bili: x / Urobili: x   Blood: x / Protein: x / Nitrite: x   Leuk Esterase: x / RBC: x / WBC x   Sq Epi: x / Non Sq Epi: x / Bacteria: x      Arterial Blood Gas:  01-19 @ 15:25  7.31/59/47/30/64.2/2.5  ABG lactate: --        MICROBIOLOGY:     RADIOLOGY & ADDITIONAL TESTS:    ASSESSMENT  WALTER DONOHUE is a 89y male with PMH of CAD s/p CABG s/p stents (last stent May 2022), s/p PPM, DM2, CKD (baseline Cr 1.2-1.3 as per family), PVD, HTN, HLD, CVA x3 (without residual deficits), and 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     PLAN  =====Neurologic=====  #CVA  - prior CVA x3 with no residual deficits  #myoclonic jerks  - on Keppra will continue, on Lexapro and memantine     =====Pulmonary=====  #COVID  - s/p paxlovid and 1 dose remdesivir while inpatient  #Pleural effusions b/l  - CT chest from 1/16 showing new small bilateral pleural effusions with associated complete collapse  of the right middle and right lower lobes and partial atelectasis of the  right upper and left lower lobes. patchy bilateral ground-glass opacities are consistent with pulmonary edema. layering secretions in the trachea, left mainstem bronchus, and right lower lobe bronchus  - currently on cefepime and azithro for empiric PNA coverage  - s/p bronch for increasing secretions- on Mucomyst, hypertonic saline, albuterol, guaifenesin, mometasone will continue   - currently on high-flow 50% at 100L, fu ABGs worsening hypercapnia possible intubation pending GOC    =====Cardiovascular=====  Patient does not currently require vasopressors and is normotensive  #HTN  - on home amlodipine and metoprolol, will continue here    #CAD  - on Brilinta aspirin and ranolazine continue     =====GI=====  #upper GI bleed  - s/p EGD showing dieulafoy lesion and esophagitis likely 2/2 NGT irritation s/p 2 clips  - on protonix IV BID continue     #Diet  - DASH diet if tolerates     =====Renal/=====  #Electrolytes  - Maintain K>4, Phos>3, Mag>2, iCal>1  - baseline cr 1.5, at baseline now      =====Endocrine=====  #DM2  - on lantus 14units and SSI  - a1c 9.3, glucose wnl inpatient     =====Infectious Disease=====  #COVID   - s/p paxlovid and 1 dose remdesivir  # PNA  - CT chest showing ground glass opacities  - treatment with azithro and cefepime currently  - f/u urine legionella  - no white count or fever thus far  - f/u bronchial cultures     =====Heme/Onc=====  #DVT Ppx  - Lovenox 40mg SQ qd    =====Ethics=====  FULL CODE MICU ACCEPT NOTE    CHIEF COMPLAINT: Patient is a 89y old  Male who presents with a chief complaint of COVID19, Chest pain (19 Jan 2024 15:20)      89 y/o M w history of CAD s/p CABG s/p stents (last stent May 2022), s/p PPM, DM2, CKD (baseline Cr 1.2-1.3 as per family), PVD, HTN, HLD, CVA x3 (without residual deficits), and 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 Patient states that he has been having a dry cough for the past week, worsened over the past few days. Denies SOB with the cough, denies hemoptysis. Reports fevers at home to 101.5 with associated diaphoresis. Said that he has significant pinprick like b/l chest pain with his cough but denies any chest pain when not coughing or with ambulation. Also reports rhinorrhea and sore throat. Reports generalized weakness and poor appetite. States his daughter was visiting him over the week end last week and she was positive for COVID19. Patient tested positive for COVID19 2 days prior and was started on paxlovid as outpatient. Of note, family states that the patient has had worsening confusion at home over the past 6 months (becoming disoriented to time) but recently has been more confused, talking about seeing people that are not present.   On arrival to the ED his vitals were T 98 -> 99.2, P 80, /72, RR 18, ) sat 100% RA. His lab work showed leukocytosis with neutrophilia and bandemia, MABLE, mild hyponatremia, indeterminant but stable troponin, and elevated lactate to 2.3. His COVID19 swab was positive. His CXR showed bibasilar crackles.    Pt's hospital course complicated by SMA angiography w SMA calcification with stent placement with diagnostic laparoscopy 12/24, small bowel and visible colon viable, some inflammation of omentum in RUQ. Course c/b GI bleed upper endoscopy showing grade b esophagitis, bleeding Dieulafoy's lesion, s/p scope on 1/2 with GI with clips placed. Pt received 1 dose of remdesivir for COVID +.       Pulmonology was consulted for patient's increasing secretions, initially treated supportively with Mucomyst, Mucinex, benzonatate and Robitussin, however with increasing secretions and new pleural effusions. IP discussed with patient's family/ son and given atelectasis and b/l effusions with increasing secretions- family opted for bronchoscopy. Pt was intubated for bronch however subsequently with increased secretions, desaturated and RRT called, patient placed on high flow and brought to MICU for closer airway monitoring/ possible intubation.       PAST MEDICAL & SURGICAL HISTORY:  Hyperlipemia      Hypertension      Coronary Artery Disease      Diabetes Mellitus Type II      Stented Coronary Artery  total 5 stents, last stent 5/2019      Neuropathy      Myocardial infarction      Stroke  mild left facial numbness   no other residuals verbalized      Myoclonic jerking      Stage 3 chronic kidney disease      History of Cataract Extraction      Hx of CABG      H/O coronary angiogram      S/P coronary artery stent placement  1/6/09      S/P placement of cardiac pacemaker          FAMILY HISTORY:  No pertinent family history in first degree relatives        SOCIAL HISTORY:  Smoking:   Substance Use:   EtOH Use:   Advance Directives:     MEDICATIONS  (STANDING):  acetylcysteine 20%  Inhalation 4 milliLiter(s) Inhalation every 6 hours  albuterol/ipratropium for Nebulization 3 milliLiter(s) Nebulizer every 6 hours  amLODIPine   Tablet 5 milliGRAM(s) Oral daily  aspirin  chewable 81 milliGRAM(s) Oral daily  azithromycin  IVPB 500 milliGRAM(s) IV Intermittent once  azithromycin  IVPB      cefepime   IVPB      chlorhexidine 2% Cloths 1 Application(s) Topical daily  escitalopram 10 milliGRAM(s) Oral daily  furosemide    Tablet 20 milliGRAM(s) Oral daily  gabapentin Solution 100 milliGRAM(s) Oral two times a day  heparin   Injectable 5000 Unit(s) SubCutaneous every 8 hours  insulin glargine Injectable (LANTUS) 14 Unit(s) SubCutaneous at bedtime  insulin lispro (ADMELOG) corrective regimen sliding scale   SubCutaneous at bedtime  insulin lispro (ADMELOG) corrective regimen sliding scale   SubCutaneous three times a day before meals  levETIRAcetam   Injectable 250 milliGRAM(s) IV Push every 12 hours  melatonin 3 milliGRAM(s) Oral at bedtime  memantine 5 milliGRAM(s) Oral two times a day  metoprolol tartrate 25 milliGRAM(s) Oral two times a day  mometasone 110 MICROgram(s) Inhaler 2 Puff(s) Inhalation every 12 hours  mupirocin 2% Ointment 1 Application(s) Topical two times a day  pantoprazole  Injectable 40 milliGRAM(s) IV Push every 12 hours  ranolazine 500 milliGRAM(s) Oral two times a day  sodium chloride 3%  Inhalation 4 milliLiter(s) Inhalation every 6 hours  sucralfate 1 Gram(s) Oral every 12 hours  ticagrelor 60 milliGRAM(s) Oral every 12 hours    MEDICATIONS  (PRN):  acetaminophen     Tablet .. 650 milliGRAM(s) Oral every 6 hours PRN Temp greater or equal to 38C (100.4F), Mild Pain (1 - 3), Moderate Pain (4 - 6)  guaiFENesin Oral Liquid (Sugar-Free) 100 milliGRAM(s) Oral every 6 hours PRN Cough      Allergies    fluoroquinolone antibiotics (Other)  Cipro (Unknown)  Tegretol (Unknown)  carbamazepine (Other)    Intolerances        REVIEW OF SYSTEMS:  CONSTITUTIONAL: No weakness, fevers or chills  EYES/ENT: No visual changes;  No vertigo or throat pain   NECK: No pain or stiffness  RESPIRATORY: No cough, wheezing, hemoptysis; No shortness of breath  CARDIOVASCULAR: No chest pain or palpitations  GASTROINTESTINAL: No abdominal or epigastric pain. No nausea, vomiting, or hematemesis; No diarrhea or constipation. No melena or hematochezia.  GENITOURINARY: No dysuria, frequency or hematuria  NEUROLOGICAL: No numbness or weakness  SKIN: No itching, rashes  [ ] All other systems negative  [ ] Unable to assess ROS because ________    OBJECTIVE:  ICU Vital Signs Last 24 Hrs  T(C): 36.7 (19 Jan 2024 13:15), Max: 36.7 (18 Jan 2024 16:43)  T(F): 98 (19 Jan 2024 13:15), Max: 98 (18 Jan 2024 16:43)  HR: 80 (19 Jan 2024 13:15) (70 - 88)  BP: 126/54 (19 Jan 2024 13:15) (110/48 - 145/50)  BP(mean): 73 (19 Jan 2024 13:15) (65 - 109)  ABP: --  ABP(mean): --  RR: 19 (19 Jan 2024 13:15) (14 - 22)  SpO2: 94% (19 Jan 2024 13:15) (92% - 100%)    O2 Parameters below as of 19 Jan 2024 13:15  Patient On (Oxygen Delivery Method): nasal cannula  O2 Flow (L/min): 3            01-18 @ 07:01 - 01-19 @ 07:00  --------------------------------------------------------  IN: 580 mL / OUT: 450 mL / NET: 130 mL    01-19 @ 07:01 - 01-19 @ 16:08  --------------------------------------------------------  IN: 0 mL / OUT: 0 mL / NET: 0 mL      CAPILLARY BLOOD GLUCOSE      POCT Blood Glucose.: 130 mg/dL (19 Jan 2024 14:47)      PHYSICAL EXAM:  GENERAL: NAD, lying in bed comfortably  HEAD:  Atraumatic, normocephalic  EYES: EOMI, PERRLA, conjunctiva and sclera clear  ENT: Moist mucous membranes  NECK: Supple, no JVD  HEART: S1, S2, regular rate and rhythm, no murmurs, rubs, or gallops  LUNGS: Unlabored respirations.  Clear to auscultation bilaterally, no crackles, wheezing, or rhonchi  ABDOMEN: Soft, nontender, nondistended, +BS  EXTREMITIES: 2+ peripheral pulses bilaterally. No clubbing, cyanosis, or edema  NERVOUS SYSTEM:  A&Ox3, no focal deficits   SKIN: No rashes or lesions  LINES:     LABS:                        8.9    8.25  )-----------( 284      ( 19 Jan 2024 06:16 )             27.7     Hgb Trend: 8.9<--, 8.9<--, 9.0<--, 9.6<--  01-19    142  |  101  |  14  ----------------------------<  60<L>  3.9   |  29  |  1.54<H>    Ca    8.4      19 Jan 2024 06:16  Phos  3.0     01-19  Mg     1.80     01-19      Creatinine Trend: 1.54<--, 1.55<--, 1.57<--, 1.58<--, 1.58<--, 1.48<--  PT/INR - ( 19 Jan 2024 06:16 )   PT: 12.6 sec;   INR: 1.12 ratio         PTT - ( 19 Jan 2024 06:16 )  PTT:34.0 sec  Urinalysis Basic - ( 19 Jan 2024 06:16 )    Color: x / Appearance: x / SG: x / pH: x  Gluc: 60 mg/dL / Ketone: x  / Bili: x / Urobili: x   Blood: x / Protein: x / Nitrite: x   Leuk Esterase: x / RBC: x / WBC x   Sq Epi: x / Non Sq Epi: x / Bacteria: x      Arterial Blood Gas:  01-19 @ 15:25  7.31/59/47/30/64.2/2.5  ABG lactate: --        MICROBIOLOGY:     RADIOLOGY & ADDITIONAL TESTS:    ASSESSMENT  WALTER DONOHUE is a 89y male with PMH of CAD s/p CABG s/p stents (last stent May 2022), s/p PPM, DM2, CKD (baseline Cr 1.2-1.3 as per family), PVD, HTN, HLD, CVA x3 (without residual deficits), and 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     PLAN  =====Neurologic=====  #CVA  - prior CVA x3 with no residual deficits  #myoclonic jerks  - on Keppra will continue, on Lexapro/ memantine and gabapentin     =====Pulmonary=====  #COVID  - s/p paxlovid and 1 dose remdesivir while inpatient  #Pleural effusions b/l  - CT chest from 1/16 showing new small bilateral pleural effusions with associated complete collapse  of the right middle and right lower lobes and partial atelectasis of the  right upper and left lower lobes. patchy bilateral ground-glass opacities are consistent with pulmonary edema. layering secretions in the trachea, left mainstem bronchus, and right lower lobe bronchus  - currently on cefepime and azithro for empiric PNA coverage  - s/p bronch for increasing secretions- on Mucomyst, hypertonic saline, albuterol, guaifenesin, mometasone will continue   - currently on high-flow 50% at 100L, fu ABGs worsening hypercapnia possible intubation pending GOC    =====Cardiovascular=====  Patient does not currently require vasopressors and is normotensive  #HTN  - on home amlodipine and metoprolol, will continue here    #CAD  - on Brilinta aspirin and ranolazine continue     =====GI=====  #upper GI bleed  - s/p EGD showing dieulafoy lesion and esophagitis likely 2/2 NGT irritation s/p 2 clips  - on protonix IV BID continue     #Diet  - DASH diet if tolerates     =====Renal/=====  #Electrolytes  - Maintain K>4, Phos>3, Mag>2, iCal>1  - baseline cr 1.5, at baseline now      =====Endocrine=====  #DM2  - on lantus 14units and SSI  - a1c 9.3, glucose wnl inpatient     =====Infectious Disease=====  #COVID   - s/p paxlovid and 1 dose remdesivir  # PNA  - CT chest showing ground glass opacities  - treatment with azithro and cefepime currently  - f/u urine legionella  - no white count or fever thus far  - f/u bronchial cultures     =====Heme/Onc=====  #DVT Ppx  - Lovenox 40mg SQ qd    =====Ethics=====  FULL CODE MICU ACCEPT NOTE    CHIEF COMPLAINT: Patient is a 89y old  Male who presents with a chief complaint of COVID19, Chest pain (19 Jan 2024 15:20)      89 y/o M w history of CAD s/p CABG s/p stents (last stent May 2022), s/p PPM, DM2, CKD (baseline Cr 1.2-1.3 as per family), PVD, HTN, HLD, CVA x3 (without residual deficits), and 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 Patient states that he has been having a dry cough for the past week, worsened over the past few days. Denies SOB with the cough, denies hemoptysis. Reports fevers at home to 101.5 with associated diaphoresis. Said that he has significant pinprick like b/l chest pain with his cough but denies any chest pain when not coughing or with ambulation. Also reports rhinorrhea and sore throat. Reports generalized weakness and poor appetite. States his daughter was visiting him over the week end last week and she was positive for COVID19. Patient tested positive for COVID19 2 days prior and was started on paxlovid as outpatient. Of note, family states that the patient has had worsening confusion at home over the past 6 months (becoming disoriented to time) but recently has been more confused, talking about seeing people that are not present.   On arrival to the ED his vitals were T 98 -> 99.2, P 80, /72, RR 18, ) sat 100% RA. His lab work showed leukocytosis with neutrophilia and bandemia, MABLE, mild hyponatremia, indeterminant but stable troponin, and elevated lactate to 2.3. His COVID19 swab was positive. His CXR showed bibasilar crackles.    Pt's hospital course complicated by SMA angiography w SMA calcification with stent placement with diagnostic laparoscopy 12/24, small bowel and visible colon viable, some inflammation of omentum in RUQ. Course c/b GI bleed upper endoscopy showing grade b esophagitis, bleeding Dieulafoy's lesion, s/p scope on 1/2 with GI with clips placed. Pt received 1 dose of remdesivir for COVID +.       Pulmonology was consulted for patient's increasing secretions, initially treated supportively with Mucomyst, Mucinex, benzonatate and Robitussin, however with increasing secretions and new pleural effusions. IP discussed with patient's family/ son and given atelectasis and b/l effusions with increasing secretions- family opted for bronchoscopy. Pt was intubated for bronch however subsequently with increased secretions, desaturated and RRT called, patient placed on high flow and brought to MICU for closer airway monitoring/ possible intubation.       PAST MEDICAL & SURGICAL HISTORY:  Hyperlipemia      Hypertension      Coronary Artery Disease      Diabetes Mellitus Type II      Stented Coronary Artery  total 5 stents, last stent 5/2019      Neuropathy      Myocardial infarction      Stroke  mild left facial numbness   no other residuals verbalized      Myoclonic jerking      Stage 3 chronic kidney disease      History of Cataract Extraction      Hx of CABG      H/O coronary angiogram      S/P coronary artery stent placement  1/6/09      S/P placement of cardiac pacemaker          FAMILY HISTORY:  No pertinent family history in first degree relatives        SOCIAL HISTORY:  Smoking:   Substance Use:   EtOH Use:   Advance Directives:     MEDICATIONS  (STANDING):  acetylcysteine 20%  Inhalation 4 milliLiter(s) Inhalation every 6 hours  albuterol/ipratropium for Nebulization 3 milliLiter(s) Nebulizer every 6 hours  amLODIPine   Tablet 5 milliGRAM(s) Oral daily  aspirin  chewable 81 milliGRAM(s) Oral daily  azithromycin  IVPB 500 milliGRAM(s) IV Intermittent once  azithromycin  IVPB      cefepime   IVPB      chlorhexidine 2% Cloths 1 Application(s) Topical daily  escitalopram 10 milliGRAM(s) Oral daily  furosemide    Tablet 20 milliGRAM(s) Oral daily  gabapentin Solution 100 milliGRAM(s) Oral two times a day  heparin   Injectable 5000 Unit(s) SubCutaneous every 8 hours  insulin glargine Injectable (LANTUS) 14 Unit(s) SubCutaneous at bedtime  insulin lispro (ADMELOG) corrective regimen sliding scale   SubCutaneous at bedtime  insulin lispro (ADMELOG) corrective regimen sliding scale   SubCutaneous three times a day before meals  levETIRAcetam   Injectable 250 milliGRAM(s) IV Push every 12 hours  melatonin 3 milliGRAM(s) Oral at bedtime  memantine 5 milliGRAM(s) Oral two times a day  metoprolol tartrate 25 milliGRAM(s) Oral two times a day  mometasone 110 MICROgram(s) Inhaler 2 Puff(s) Inhalation every 12 hours  mupirocin 2% Ointment 1 Application(s) Topical two times a day  pantoprazole  Injectable 40 milliGRAM(s) IV Push every 12 hours  ranolazine 500 milliGRAM(s) Oral two times a day  sodium chloride 3%  Inhalation 4 milliLiter(s) Inhalation every 6 hours  sucralfate 1 Gram(s) Oral every 12 hours  ticagrelor 60 milliGRAM(s) Oral every 12 hours    MEDICATIONS  (PRN):  acetaminophen     Tablet .. 650 milliGRAM(s) Oral every 6 hours PRN Temp greater or equal to 38C (100.4F), Mild Pain (1 - 3), Moderate Pain (4 - 6)  guaiFENesin Oral Liquid (Sugar-Free) 100 milliGRAM(s) Oral every 6 hours PRN Cough      Allergies    fluoroquinolone antibiotics (Other)  Cipro (Unknown)  Tegretol (Unknown)  carbamazepine (Other)    Intolerances        REVIEW OF SYSTEMS:  CONSTITUTIONAL: No weakness, fevers or chills  EYES/ENT: No visual changes;  No vertigo or throat pain   NECK: No pain or stiffness  RESPIRATORY: No cough, wheezing, hemoptysis; No shortness of breath  CARDIOVASCULAR: No chest pain or palpitations  GASTROINTESTINAL: No abdominal or epigastric pain. No nausea, vomiting, or hematemesis; No diarrhea or constipation. No melena or hematochezia.  GENITOURINARY: No dysuria, frequency or hematuria  NEUROLOGICAL: No numbness or weakness  SKIN: No itching, rashes  [ ] All other systems negative  [ ] Unable to assess ROS because ________    OBJECTIVE:  ICU Vital Signs Last 24 Hrs  T(C): 36.7 (19 Jan 2024 13:15), Max: 36.7 (18 Jan 2024 16:43)  T(F): 98 (19 Jan 2024 13:15), Max: 98 (18 Jan 2024 16:43)  HR: 80 (19 Jan 2024 13:15) (70 - 88)  BP: 126/54 (19 Jan 2024 13:15) (110/48 - 145/50)  BP(mean): 73 (19 Jan 2024 13:15) (65 - 109)  ABP: --  ABP(mean): --  RR: 19 (19 Jan 2024 13:15) (14 - 22)  SpO2: 94% (19 Jan 2024 13:15) (92% - 100%)    O2 Parameters below as of 19 Jan 2024 13:15  Patient On (Oxygen Delivery Method): nasal cannula  O2 Flow (L/min): 3            01-18 @ 07:01 - 01-19 @ 07:00  --------------------------------------------------------  IN: 580 mL / OUT: 450 mL / NET: 130 mL    01-19 @ 07:01 - 01-19 @ 16:08  --------------------------------------------------------  IN: 0 mL / OUT: 0 mL / NET: 0 mL      CAPILLARY BLOOD GLUCOSE      POCT Blood Glucose.: 130 mg/dL (19 Jan 2024 14:47)      PHYSICAL EXAM:  GENERAL: NAD, lying in bed comfortably  HEAD:  Atraumatic, normocephalic  EYES: EOMI, PERRLA, conjunctiva and sclera clear  ENT: Moist mucous membranes  NECK: Supple, no JVD  HEART: S1, S2, regular rate and rhythm, no murmurs, rubs, or gallops  LUNGS: Unlabored respirations.  Clear to auscultation bilaterally, no crackles, wheezing, or rhonchi  ABDOMEN: Soft, nontender, nondistended, +BS  EXTREMITIES: 2+ peripheral pulses bilaterally. No clubbing, cyanosis, or edema  NERVOUS SYSTEM:  A&Ox3, no focal deficits   SKIN: No rashes or lesions  LINES:     LABS:                        8.9    8.25  )-----------( 284      ( 19 Jan 2024 06:16 )             27.7     Hgb Trend: 8.9<--, 8.9<--, 9.0<--, 9.6<--  01-19    142  |  101  |  14  ----------------------------<  60<L>  3.9   |  29  |  1.54<H>    Ca    8.4      19 Jan 2024 06:16  Phos  3.0     01-19  Mg     1.80     01-19      Creatinine Trend: 1.54<--, 1.55<--, 1.57<--, 1.58<--, 1.58<--, 1.48<--  PT/INR - ( 19 Jan 2024 06:16 )   PT: 12.6 sec;   INR: 1.12 ratio         PTT - ( 19 Jan 2024 06:16 )  PTT:34.0 sec  Urinalysis Basic - ( 19 Jan 2024 06:16 )    Color: x / Appearance: x / SG: x / pH: x  Gluc: 60 mg/dL / Ketone: x  / Bili: x / Urobili: x   Blood: x / Protein: x / Nitrite: x   Leuk Esterase: x / RBC: x / WBC x   Sq Epi: x / Non Sq Epi: x / Bacteria: x      Arterial Blood Gas:  01-19 @ 15:25  7.31/59/47/30/64.2/2.5  ABG lactate: --        MICROBIOLOGY:     RADIOLOGY & ADDITIONAL TESTS:    ASSESSMENT  WALTER DONOHUE is a 89y male with PMH of CAD s/p CABG s/p stents (last stent May 2022), s/p PPM, DM2, CKD (baseline Cr 1.2-1.3 as per family), PVD, HTN, HLD, CVA x3 (without residual deficits), and 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     PLAN  =====Neurologic=====  #CVA  - prior CVA x3 with no residual deficits  #myoclonic jerks  - on Keppra will continue  - holding home lexapro gabapentin and memantine     =====Pulmonary=====  #COVID  - s/p paxlovid and 1 dose remdesivir while inpatient  #Pleural effusions b/l  - CT chest from 1/16 showing new small bilateral pleural effusions with associated complete collapse  of the right middle and right lower lobes and partial atelectasis of the  right upper and left lower lobes. patchy bilateral ground-glass opacities are consistent with pulmonary edema. layering secretions in the trachea, left mainstem bronchus, and right lower lobe bronchus  - currently on cefepime and azithro for empiric PNA coverage  - s/p bronch for increasing secretions- on Mucomyst, hypertonic saline, albuterol, guaifenesin, mometasone will continue   - currently on high-flow 50% at 100L, fu ABGs worsening hypercapnia possible intubation pending GOC    =====Cardiovascular=====  Patient does not currently require vasopressors and is normotensive  #HTN  - on home amlodipine and metoprolol, will continue here    #CAD  - on Brilinta aspirin and ranolazine continue     =====GI=====  #upper GI bleed  - s/p EGD showing dieulafoy lesion and esophagitis likely 2/2 NGT irritation s/p 2 clips  - on protonix IV BID continue     #Diet  - DASH diet if tolerates     =====Renal/=====  #Electrolytes  - Maintain K>4, Phos>3, Mag>2, iCal>1  - baseline cr 1.5, at baseline now      =====Endocrine=====  #DM2  - on lantus 14units and SSI  - a1c 9.3, glucose wnl inpatient     =====Infectious Disease=====  #COVID   - s/p paxlovid and 1 dose remdesivir  # PNA  - CT chest showing ground glass opacities  - treatment with azithro and cefepime currently  - f/u urine legionella  - no white count or fever thus far  - f/u bronchial cultures     =====Heme/Onc=====  #DVT Ppx  - Lovenox 40mg SQ qd    =====Ethics=====  FULL CODE MICU ACCEPT NOTE    CHIEF COMPLAINT: Patient is a 89y old  Male who presents with a chief complaint of COVID19, Chest pain (19 Jan 2024 15:20)      89 y/o M w history of CAD s/p CABG s/p stents (last stent May 2022), s/p PPM, DM2, CKD (baseline Cr 1.2-1.3 as per family), PVD, HTN, HLD, CVA x3 (without residual deficits), and 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 Patient states that he has been having a dry cough for the past week, worsened over the past few days. Denies SOB with the cough, denies hemoptysis. Reports fevers at home to 101.5 with associated diaphoresis. Said that he has significant pinprick like b/l chest pain with his cough but denies any chest pain when not coughing or with ambulation. Also reports rhinorrhea and sore throat. Reports generalized weakness and poor appetite. States his daughter was visiting him over the week end last week and she was positive for COVID19. Patient tested positive for COVID19 2 days prior and was started on paxlovid as outpatient. Of note, family states that the patient has had worsening confusion at home over the past 6 months (becoming disoriented to time) but recently has been more confused, talking about seeing people that are not present.   On arrival to the ED his vitals were T 98 -> 99.2, P 80, /72, RR 18, ) sat 100% RA. His lab work showed leukocytosis with neutrophilia and bandemia, MABLE, mild hyponatremia, indeterminant but stable troponin, and elevated lactate to 2.3. His COVID19 swab was positive. His CXR showed bibasilar crackles.    Pt's hospital course complicated by SMA angiography w SMA calcification with stent placement with diagnostic laparoscopy 12/24, small bowel and visible colon viable, some inflammation of omentum in RUQ. Course c/b GI bleed upper endoscopy showing grade b esophagitis, bleeding Dieulafoy's lesion, s/p scope on 1/2 with GI with clips placed. Pt received 1 dose of remdesivir for COVID +.       Pulmonology was consulted for patient's increasing secretions, initially treated supportively with Mucomyst, Mucinex, benzonatate and Robitussin, however with increasing secretions and new pleural effusions. IP discussed with patient's family/ son and given atelectasis and b/l effusions with increasing secretions- family opted for bronchoscopy. Pt was intubated for bronch however subsequently with increased secretions, desaturated and RRT called. Patient desaturated on 3L, increased to 6L, patient's O2 sat still in the 80s. Patient had increased secretions, that were audible. House pulm immediately called and arrived at bedside. Suctioning attempted, however patient still desaturated. Patient placed on HFNC, O2 sat improved to 90s. Patient transferred to MICU for close airway monitoring and possible intubation.       PAST MEDICAL & SURGICAL HISTORY:  Hyperlipemia      Hypertension      Coronary Artery Disease      Diabetes Mellitus Type II      Stented Coronary Artery  total 5 stents, last stent 5/2019      Neuropathy      Myocardial infarction      Stroke  mild left facial numbness   no other residuals verbalized      Myoclonic jerking      Stage 3 chronic kidney disease      History of Cataract Extraction      Hx of CABG      H/O coronary angiogram      S/P coronary artery stent placement  1/6/09      S/P placement of cardiac pacemaker          FAMILY HISTORY:  No pertinent family history in first degree relatives        SOCIAL HISTORY:  Smoking:   Substance Use:   EtOH Use:   Advance Directives:     MEDICATIONS  (STANDING):  acetylcysteine 20%  Inhalation 4 milliLiter(s) Inhalation every 6 hours  albuterol/ipratropium for Nebulization 3 milliLiter(s) Nebulizer every 6 hours  amLODIPine   Tablet 5 milliGRAM(s) Oral daily  aspirin  chewable 81 milliGRAM(s) Oral daily  azithromycin  IVPB 500 milliGRAM(s) IV Intermittent once  azithromycin  IVPB      cefepime   IVPB      chlorhexidine 2% Cloths 1 Application(s) Topical daily  escitalopram 10 milliGRAM(s) Oral daily  furosemide    Tablet 20 milliGRAM(s) Oral daily  gabapentin Solution 100 milliGRAM(s) Oral two times a day  heparin   Injectable 5000 Unit(s) SubCutaneous every 8 hours  insulin glargine Injectable (LANTUS) 14 Unit(s) SubCutaneous at bedtime  insulin lispro (ADMELOG) corrective regimen sliding scale   SubCutaneous at bedtime  insulin lispro (ADMELOG) corrective regimen sliding scale   SubCutaneous three times a day before meals  levETIRAcetam   Injectable 250 milliGRAM(s) IV Push every 12 hours  melatonin 3 milliGRAM(s) Oral at bedtime  memantine 5 milliGRAM(s) Oral two times a day  metoprolol tartrate 25 milliGRAM(s) Oral two times a day  mometasone 110 MICROgram(s) Inhaler 2 Puff(s) Inhalation every 12 hours  mupirocin 2% Ointment 1 Application(s) Topical two times a day  pantoprazole  Injectable 40 milliGRAM(s) IV Push every 12 hours  ranolazine 500 milliGRAM(s) Oral two times a day  sodium chloride 3%  Inhalation 4 milliLiter(s) Inhalation every 6 hours  sucralfate 1 Gram(s) Oral every 12 hours  ticagrelor 60 milliGRAM(s) Oral every 12 hours    MEDICATIONS  (PRN):  acetaminophen     Tablet .. 650 milliGRAM(s) Oral every 6 hours PRN Temp greater or equal to 38C (100.4F), Mild Pain (1 - 3), Moderate Pain (4 - 6)  guaiFENesin Oral Liquid (Sugar-Free) 100 milliGRAM(s) Oral every 6 hours PRN Cough      Allergies    fluoroquinolone antibiotics (Other)  Cipro (Unknown)  Tegretol (Unknown)  carbamazepine (Other)    Intolerances        REVIEW OF SYSTEMS:  CONSTITUTIONAL: No weakness, fevers or chills  EYES/ENT: No visual changes;  No vertigo or throat pain   NECK: No pain or stiffness  RESPIRATORY: No cough, wheezing, hemoptysis; No shortness of breath  CARDIOVASCULAR: No chest pain or palpitations  GASTROINTESTINAL: No abdominal or epigastric pain. No nausea, vomiting, or hematemesis; No diarrhea or constipation. No melena or hematochezia.  GENITOURINARY: No dysuria, frequency or hematuria  NEUROLOGICAL: No numbness or weakness  SKIN: No itching, rashes  [ ] All other systems negative  [ ] Unable to assess ROS because ________    OBJECTIVE:  ICU Vital Signs Last 24 Hrs  T(C): 36.7 (19 Jan 2024 13:15), Max: 36.7 (18 Jan 2024 16:43)  T(F): 98 (19 Jan 2024 13:15), Max: 98 (18 Jan 2024 16:43)  HR: 80 (19 Jan 2024 13:15) (70 - 88)  BP: 126/54 (19 Jan 2024 13:15) (110/48 - 145/50)  BP(mean): 73 (19 Jan 2024 13:15) (65 - 109)  ABP: --  ABP(mean): --  RR: 19 (19 Jan 2024 13:15) (14 - 22)  SpO2: 94% (19 Jan 2024 13:15) (92% - 100%)    O2 Parameters below as of 19 Jan 2024 13:15  Patient On (Oxygen Delivery Method): nasal cannula  O2 Flow (L/min): 3            01-18 @ 07:01 - 01-19 @ 07:00  --------------------------------------------------------  IN: 580 mL / OUT: 450 mL / NET: 130 mL    01-19 @ 07:01  -  01-19 @ 16:08  --------------------------------------------------------  IN: 0 mL / OUT: 0 mL / NET: 0 mL      CAPILLARY BLOOD GLUCOSE      POCT Blood Glucose.: 130 mg/dL (19 Jan 2024 14:47)      PHYSICAL EXAM:  GENERAL: NAD, lying in bed comfortably  HEAD:  Atraumatic, normocephalic  EYES: EOMI, PERRLA, conjunctiva and sclera clear  ENT: Moist mucous membranes  NECK: Supple, no JVD  HEART: S1, S2, regular rate and rhythm, no murmurs, rubs, or gallops  LUNGS: Unlabored respirations.  Clear to auscultation bilaterally, no crackles, wheezing, or rhonchi  ABDOMEN: Soft, nontender, nondistended, +BS  EXTREMITIES: 2+ peripheral pulses bilaterally. No clubbing, cyanosis, or edema  NERVOUS SYSTEM:  A&Ox3, no focal deficits   SKIN: No rashes or lesions  LINES:     LABS:                        8.9    8.25  )-----------( 284      ( 19 Jan 2024 06:16 )             27.7     Hgb Trend: 8.9<--, 8.9<--, 9.0<--, 9.6<--  01-19    142  |  101  |  14  ----------------------------<  60<L>  3.9   |  29  |  1.54<H>    Ca    8.4      19 Jan 2024 06:16  Phos  3.0     01-19  Mg     1.80     01-19      Creatinine Trend: 1.54<--, 1.55<--, 1.57<--, 1.58<--, 1.58<--, 1.48<--  PT/INR - ( 19 Jan 2024 06:16 )   PT: 12.6 sec;   INR: 1.12 ratio         PTT - ( 19 Jan 2024 06:16 )  PTT:34.0 sec  Urinalysis Basic - ( 19 Jan 2024 06:16 )    Color: x / Appearance: x / SG: x / pH: x  Gluc: 60 mg/dL / Ketone: x  / Bili: x / Urobili: x   Blood: x / Protein: x / Nitrite: x   Leuk Esterase: x / RBC: x / WBC x   Sq Epi: x / Non Sq Epi: x / Bacteria: x      Arterial Blood Gas:  01-19 @ 15:25  7.31/59/47/30/64.2/2.5  ABG lactate: --        MICROBIOLOGY:     RADIOLOGY & ADDITIONAL TESTS:    ASSESSMENT  WALTER DONOHUE is a 89y male with PMH of CAD s/p CABG s/p stents (last stent May 2022), s/p PPM, DM2, CKD (baseline Cr 1.2-1.3 as per family), PVD, HTN, HLD, CVA x3 (without residual deficits), and 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     PLAN  =====Neurologic=====  #CVA  - prior CVA x3 with no residual deficits  #myoclonic jerks  - on Keppra will continue  - holding home lexapro gabapentin and memantine     =====Pulmonary=====  #COVID  - s/p paxlovid and 1 dose remdesivir while inpatient  #Pleural effusions b/l  - CT chest from 1/16 showing new small bilateral pleural effusions with associated complete collapse  of the right middle and right lower lobes and partial atelectasis of the  right upper and left lower lobes. patchy bilateral ground-glass opacities are consistent with pulmonary edema. layering secretions in the trachea, left mainstem bronchus, and right lower lobe bronchus  - currently on cefepime and azithro for empiric PNA coverage  - s/p bronch for increasing secretions- on Mucomyst, hypertonic saline, albuterol, guaifenesin, mometasone will continue   - currently on high-flow 50% at 100L, fu ABGs worsening hypercapnia possible intubation pending GOC    =====Cardiovascular=====  Patient does not currently require vasopressors and is normotensive  #HTN  - on home amlodipine and metoprolol, will continue here    #CAD  - on Brilinta aspirin and ranolazine continue     =====GI=====  #upper GI bleed  - s/p EGD showing dieulafoy lesion and esophagitis likely 2/2 NGT irritation s/p 2 clips  - on protonix IV BID continue     #Diet  - DASH diet if tolerates     =====Renal/=====  #Electrolytes  - Maintain K>4, Phos>3, Mag>2, iCal>1  - baseline cr 1.5, at baseline now      =====Endocrine=====  #DM2  - on lantus 14units and SSI  - a1c 9.3, glucose wnl inpatient     =====Infectious Disease=====  #COVID   - s/p paxlovid and 1 dose remdesivir  # PNA  - CT chest showing ground glass opacities  - treatment with azithro and cefepime currently  - f/u urine legionella  - no white count or fever thus far  - f/u bronchial cultures     =====Heme/Onc=====  #DVT Ppx  - Lovenox 40mg SQ qd    =====Ethics=====  FULL CODE

## 2024-01-19 NOTE — SWALLOW VFSS/MBS ASSESSMENT ADULT - RECOMMENDED CONSISTENCY
Puree and moderately thick fluids
1.) Puree with Moderately Thick Liquids  2.) Feeding/Swallowing Guidelines: Upright position, Puree and Moderately thick liquids via Teaspoon, two swallow per bolus; alternate puree and moderately thick liquids  3.) Aspiration Precautions  4.) Reflux Precautions  5.) Maintain Good Oral Hygiene Care

## 2024-01-20 LAB
ALBUMIN SERPL ELPH-MCNC: 2.4 G/DL — LOW (ref 3.3–5)
ALP SERPL-CCNC: 50 U/L — SIGNIFICANT CHANGE UP (ref 40–120)
ALT FLD-CCNC: 14 U/L — SIGNIFICANT CHANGE UP (ref 4–41)
ANION GAP SERPL CALC-SCNC: 12 MMOL/L — SIGNIFICANT CHANGE UP (ref 7–14)
APTT BLD: 32.2 SEC — SIGNIFICANT CHANGE UP (ref 24.5–35.6)
AST SERPL-CCNC: 17 U/L — SIGNIFICANT CHANGE UP (ref 4–40)
BILIRUB SERPL-MCNC: 0.5 MG/DL — SIGNIFICANT CHANGE UP (ref 0.2–1.2)
BUN SERPL-MCNC: 16 MG/DL — SIGNIFICANT CHANGE UP (ref 7–23)
CALCIUM SERPL-MCNC: 8.1 MG/DL — LOW (ref 8.4–10.5)
CHLORIDE SERPL-SCNC: 100 MMOL/L — SIGNIFICANT CHANGE UP (ref 98–107)
CO2 SERPL-SCNC: 26 MMOL/L — SIGNIFICANT CHANGE UP (ref 22–31)
CREAT SERPL-MCNC: 1.63 MG/DL — HIGH (ref 0.5–1.3)
EGFR: 40 ML/MIN/1.73M2 — LOW
GAS PNL BLDA: SIGNIFICANT CHANGE UP
GLUCOSE BLDC GLUCOMTR-MCNC: 110 MG/DL — HIGH (ref 70–99)
GLUCOSE BLDC GLUCOMTR-MCNC: 133 MG/DL — HIGH (ref 70–99)
GLUCOSE BLDC GLUCOMTR-MCNC: 136 MG/DL — HIGH (ref 70–99)
GLUCOSE BLDC GLUCOMTR-MCNC: 136 MG/DL — HIGH (ref 70–99)
GLUCOSE BLDC GLUCOMTR-MCNC: 148 MG/DL — HIGH (ref 70–99)
GLUCOSE SERPL-MCNC: 159 MG/DL — HIGH (ref 70–99)
HCT VFR BLD CALC: 27.3 % — LOW (ref 39–50)
HGB BLD-MCNC: 8.7 G/DL — LOW (ref 13–17)
INR BLD: 1.16 RATIO — SIGNIFICANT CHANGE UP (ref 0.85–1.18)
LEGIONELLA AG UR QL: NEGATIVE — SIGNIFICANT CHANGE UP
MAGNESIUM SERPL-MCNC: 1.6 MG/DL — SIGNIFICANT CHANGE UP (ref 1.6–2.6)
MCHC RBC-ENTMCNC: 30.4 PG — SIGNIFICANT CHANGE UP (ref 27–34)
MCHC RBC-ENTMCNC: 31.9 GM/DL — LOW (ref 32–36)
MCV RBC AUTO: 95.5 FL — SIGNIFICANT CHANGE UP (ref 80–100)
MRSA PCR RESULT.: SIGNIFICANT CHANGE UP
NIGHT BLUE STAIN TISS: SIGNIFICANT CHANGE UP
NRBC # BLD: 0 /100 WBCS — SIGNIFICANT CHANGE UP (ref 0–0)
NRBC # FLD: 0 K/UL — SIGNIFICANT CHANGE UP (ref 0–0)
PHOSPHATE SERPL-MCNC: 3.4 MG/DL — SIGNIFICANT CHANGE UP (ref 2.5–4.5)
PLATELET # BLD AUTO: 271 K/UL — SIGNIFICANT CHANGE UP (ref 150–400)
POTASSIUM SERPL-MCNC: 4.1 MMOL/L — SIGNIFICANT CHANGE UP (ref 3.5–5.3)
POTASSIUM SERPL-SCNC: 4.1 MMOL/L — SIGNIFICANT CHANGE UP (ref 3.5–5.3)
PROT SERPL-MCNC: 6.3 G/DL — SIGNIFICANT CHANGE UP (ref 6–8.3)
PROTHROM AB SERPL-ACNC: 13 SEC — SIGNIFICANT CHANGE UP (ref 9.5–13)
RBC # BLD: 2.86 M/UL — LOW (ref 4.2–5.8)
RBC # FLD: 17.2 % — HIGH (ref 10.3–14.5)
S AUREUS DNA NOSE QL NAA+PROBE: DETECTED
S PNEUM AG UR QL: NEGATIVE — SIGNIFICANT CHANGE UP
SODIUM SERPL-SCNC: 138 MMOL/L — SIGNIFICANT CHANGE UP (ref 135–145)
SPECIMEN SOURCE: SIGNIFICANT CHANGE UP
WBC # BLD: 16.2 K/UL — HIGH (ref 3.8–10.5)
WBC # FLD AUTO: 16.2 K/UL — HIGH (ref 3.8–10.5)

## 2024-01-20 PROCEDURE — 99291 CRITICAL CARE FIRST HOUR: CPT

## 2024-01-20 RX ORDER — PIPERACILLIN AND TAZOBACTAM 4; .5 G/20ML; G/20ML
3.38 INJECTION, POWDER, LYOPHILIZED, FOR SOLUTION INTRAVENOUS EVERY 8 HOURS
Refills: 0 | Status: DISCONTINUED | OUTPATIENT
Start: 2024-01-20 | End: 2024-01-28

## 2024-01-20 RX ORDER — PIPERACILLIN AND TAZOBACTAM 4; .5 G/20ML; G/20ML
3.38 INJECTION, POWDER, LYOPHILIZED, FOR SOLUTION INTRAVENOUS ONCE
Refills: 0 | Status: COMPLETED | OUTPATIENT
Start: 2024-01-20 | End: 2024-01-20

## 2024-01-20 RX ORDER — INSULIN LISPRO 100/ML
VIAL (ML) SUBCUTANEOUS EVERY 6 HOURS
Refills: 0 | Status: DISCONTINUED | OUTPATIENT
Start: 2024-01-20 | End: 2024-01-22

## 2024-01-20 RX ORDER — MAGNESIUM SULFATE 500 MG/ML
1 VIAL (ML) INJECTION ONCE
Refills: 0 | Status: COMPLETED | OUTPATIENT
Start: 2024-01-20 | End: 2024-01-20

## 2024-01-20 RX ORDER — INSULIN GLARGINE 100 [IU]/ML
7 INJECTION, SOLUTION SUBCUTANEOUS AT BEDTIME
Refills: 0 | Status: DISCONTINUED | OUTPATIENT
Start: 2024-01-20 | End: 2024-01-23

## 2024-01-20 RX ORDER — LIDOCAINE 4 G/100G
1 CREAM TOPICAL EVERY 24 HOURS
Refills: 0 | Status: DISCONTINUED | OUTPATIENT
Start: 2024-01-20 | End: 2024-02-11

## 2024-01-20 RX ADMIN — MUPIROCIN 1 APPLICATION(S): 20 OINTMENT TOPICAL at 05:23

## 2024-01-20 RX ADMIN — SODIUM CHLORIDE 4 MILLILITER(S): 9 INJECTION INTRAMUSCULAR; INTRAVENOUS; SUBCUTANEOUS at 03:46

## 2024-01-20 RX ADMIN — Medication 4 MILLILITER(S): at 03:46

## 2024-01-20 RX ADMIN — SODIUM CHLORIDE 4 MILLILITER(S): 9 INJECTION INTRAMUSCULAR; INTRAVENOUS; SUBCUTANEOUS at 09:44

## 2024-01-20 RX ADMIN — CHLORHEXIDINE GLUCONATE 1 APPLICATION(S): 213 SOLUTION TOPICAL at 15:03

## 2024-01-20 RX ADMIN — Medication 3 MILLILITER(S): at 22:14

## 2024-01-20 RX ADMIN — LEVETIRACETAM 400 MILLIGRAM(S): 250 TABLET, FILM COATED ORAL at 18:47

## 2024-01-20 RX ADMIN — Medication 3 MILLILITER(S): at 15:56

## 2024-01-20 RX ADMIN — PIPERACILLIN AND TAZOBACTAM 200 GRAM(S): 4; .5 INJECTION, POWDER, LYOPHILIZED, FOR SOLUTION INTRAVENOUS at 13:15

## 2024-01-20 RX ADMIN — HEPARIN SODIUM 5000 UNIT(S): 5000 INJECTION INTRAVENOUS; SUBCUTANEOUS at 22:30

## 2024-01-20 RX ADMIN — SODIUM CHLORIDE 4 MILLILITER(S): 9 INJECTION INTRAMUSCULAR; INTRAVENOUS; SUBCUTANEOUS at 15:57

## 2024-01-20 RX ADMIN — MUPIROCIN 1 APPLICATION(S): 20 OINTMENT TOPICAL at 18:25

## 2024-01-20 RX ADMIN — CEFEPIME 100 MILLIGRAM(S): 1 INJECTION, POWDER, FOR SOLUTION INTRAMUSCULAR; INTRAVENOUS at 05:33

## 2024-01-20 RX ADMIN — Medication 25 MILLIGRAM(S): at 18:26

## 2024-01-20 RX ADMIN — LEVETIRACETAM 400 MILLIGRAM(S): 250 TABLET, FILM COATED ORAL at 05:28

## 2024-01-20 RX ADMIN — PIPERACILLIN AND TAZOBACTAM 25 GRAM(S): 4; .5 INJECTION, POWDER, LYOPHILIZED, FOR SOLUTION INTRAVENOUS at 22:30

## 2024-01-20 RX ADMIN — Medication 10 MILLIGRAM(S): at 05:55

## 2024-01-20 RX ADMIN — HEPARIN SODIUM 5000 UNIT(S): 5000 INJECTION INTRAVENOUS; SUBCUTANEOUS at 15:03

## 2024-01-20 RX ADMIN — Medication 3 MILLILITER(S): at 09:43

## 2024-01-20 RX ADMIN — Medication 650 MILLIGRAM(S): at 20:30

## 2024-01-20 RX ADMIN — Medication 3 MILLILITER(S): at 03:46

## 2024-01-20 RX ADMIN — Medication 1 GRAM(S): at 18:26

## 2024-01-20 RX ADMIN — INSULIN GLARGINE 7 UNIT(S): 100 INJECTION, SOLUTION SUBCUTANEOUS at 22:38

## 2024-01-20 RX ADMIN — Medication 100 GRAM(S): at 15:18

## 2024-01-20 RX ADMIN — ESCITALOPRAM OXALATE 10 MILLIGRAM(S): 10 TABLET, FILM COATED ORAL at 13:16

## 2024-01-20 RX ADMIN — LIDOCAINE 1 PATCH: 4 CREAM TOPICAL at 19:50

## 2024-01-20 RX ADMIN — PIPERACILLIN AND TAZOBACTAM 25 GRAM(S): 4; .5 INJECTION, POWDER, LYOPHILIZED, FOR SOLUTION INTRAVENOUS at 16:59

## 2024-01-20 RX ADMIN — Medication 650 MILLIGRAM(S): at 19:50

## 2024-01-20 RX ADMIN — TICAGRELOR 60 MILLIGRAM(S): 90 TABLET ORAL at 18:26

## 2024-01-20 RX ADMIN — MOMETASONE FUROATE 2 PUFF(S): 220 INHALANT RESPIRATORY (INHALATION) at 09:50

## 2024-01-20 RX ADMIN — SODIUM CHLORIDE 4 MILLILITER(S): 9 INJECTION INTRAMUSCULAR; INTRAVENOUS; SUBCUTANEOUS at 22:14

## 2024-01-20 RX ADMIN — HEPARIN SODIUM 5000 UNIT(S): 5000 INJECTION INTRAVENOUS; SUBCUTANEOUS at 05:24

## 2024-01-20 RX ADMIN — Medication 81 MILLIGRAM(S): at 13:16

## 2024-01-20 RX ADMIN — PANTOPRAZOLE SODIUM 40 MILLIGRAM(S): 20 TABLET, DELAYED RELEASE ORAL at 18:27

## 2024-01-20 RX ADMIN — PANTOPRAZOLE SODIUM 40 MILLIGRAM(S): 20 TABLET, DELAYED RELEASE ORAL at 05:23

## 2024-01-20 RX ADMIN — INSULIN GLARGINE 14 UNIT(S): 100 INJECTION, SOLUTION SUBCUTANEOUS at 00:50

## 2024-01-20 NOTE — PROGRESS NOTE ADULT - ASSESSMENT
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 CTA demonstrating mesenteric fat stranding associated with ascending/transverse colon. S/p SMA angiography with stent placement with diagnostic laparoscopy 12/24, small bowel and visible colon viable, some inflammation of omentum in RUQ. Course c/b GI bleed, s/p scope on 1/2 with GI with clips placed. Patient transferred overnight from SICU to the floor in clinically stable condition.       Problem/Recommendation - 1:  ·  Problem: Acute respiratory failure with hypoxia .   ·  Recommendation: Postbronchoscopy & likely secondary to aspiration   On HF oxygen .  IV Abxs Zosyn started.      Problem/Recommendation - 2:  ·  Problem: Acute renal failure superimposed on chronic kidney disease.   ·  Recommendation: Creatinine stable.      Problem/Recommendation - 3:  ·  Problem: CAD (coronary artery disease).   ·  Recommendation: S/P CABG and Stents. On DAPT and BB.  cardiology help appreciated.     Problem/Recommendation - 4:  ·  Problem: Essential hypertension.   ·  Recommendation: BP meds with hold parameters.     Problem/Recommendation - 5:  ·  Problem: GI bleed.   ·  Recommendation: On PPI.  S/P EGD.   Impression:          - NG tube removed before endoscopy                       - LA Grade B esophagitis.                       - Bleeding Dieulafoy's lesion of the posterior wall of                        the gastric body. This is likely the source of                        clinically significant bleeding. Hemostasis achieved                        using 2 MR conditional hemoclips.                       - Bleeding edematous mucosa and nonbleeding clean based                        gastric ulcers in the posterior wall of the gastric                        body. These are likely due to NG tube irritation.                        Hemostasis achieved using 1 MR conditional hemoclip.                       - Non-bleeding small clean based duodenal ulcer                       - No specimens collected.    < end of copied text >.     Problem/Recommendation - 6:  ·  Problem: Myoclonic jerking.   ·  Recommendation: On keppra.     Problem/Recommendation - 7:  ·  Problem: Dysphagia.   ·  Recommendation:  S/P  cineesophagogram .  Puree diet     Problem/Recommendation - 8:  ·  Problem: Abdominal distension.   ·  Recommendation: S/P  SMA angiography with stent placement with diagnostic laparoscopy 12/24,  Vascular following.     Problem/Recommendation - 9:  ·  Problem: Anemia due to acute blood loss.   ·  Recommendation: S/P PRBC.   HH stable.       Problem/Recommendation - 10:  ·  Problem: Toxic metabolic encephalopathy.   ·  Recommendation: Mental status waxes and wanes .  D/W Neurology attending and will see patient .   MRI pending.   < from: CT Head No Cont (01.16.24 @ 18:01) >    IMPRESSION:    No evidence of acute intracranial hemorrhage, midline shift or CT   evidence of acute territorial infarct.  Partial opacificationof the left middle ear and mastoid air cells.   Correlate clinically for otomastoiditis.  If the patient's symptoms persist, consider short interval follow-up head   CT or brain MRI if there are no MRI contraindications.    Will get ENT consult in AM.    Problem/Recommendation - 11:  ·  Problem: 2019 novel coronavirus disease (COVID-19).   ·  Recommendation: S/P Renmdisvir.     Problem/Recommendation - 12:  ·  Problem: Pleural effusion  with Leg edema    ·  Recommendation:  Diuretics PRN . ,  Doppler negative for  DVT.  On Gabapentin  < from: TTE W or WO Ultrasound Enhancing Agent (12.25.23 @ 11:09) >  CONCLUSIONS:      1. Technically difficult image quality.   2. Left ventricular cavity is normal. Left ventricular wallthickness is normal. Left ventricular systolic function is normal with an ejection fraction of 62 % by Florence's method of disks. There are no regional wall motion abnormalities seen.   3. Normal right ventricular cavity size and probably normal systolic function.   4. Structurally normal mitral valve with normal leaflet excursion. There is calcification of the mitral valve annulus. There is mild mitral regurgitation.   5. Aortic valve leaflet morphology not well visualized. with reduced systolicexcursion. There is calcification of the aortic valve leaflets. The peak transaortic velocity is 2.47 m/s, peak transaortic gradient is 24.4 mmHg and mean transaortic gradient is 14.0 mmHg with an LVOT/aortic valve VTI ratio of 0.20. Probable mild tomoderate aortic stenosis. There is mild aortic regurgitation.   6. No prior echocardiogram is available for comparison.    < end of copied text >     Problem/Recommendation - 13:  ·  Problem: Persistent Cough ? Aspiration Pneumonia with B/L pleural Effusion Cllapse of middle lobe.   ·  Recommendation:   Cough suppressants .  Pulmonary helping   S/P Bronchoscopy .    < from: CT Chest No Cont (01.16.24 @ 18:04) >  IMPRESSION:  Occluded right lower lobe bronchus with right lower lobar collapse.  Partially occluded right middle lobe bronchus with near complete right   middle lobar collapse.  Bilateral upper and left lower lobe nodular and groundglass opacities,   predominantly in the right upper lobe.  Moderate-sized bilateral pleural effusions.     Problem/Recommendation - 14:  ·  Problem: Type 2 diabetes mellitus with hyperglycemia.   ·  Recommendation: sugars better now.   Continue Lantus and SSI.    D/W Sons in room.   Dispo : DC planning to [facility] .

## 2024-01-20 NOTE — PROGRESS NOTE ADULT - SUBJECTIVE AND OBJECTIVE BOX
Aashish oByer MD  Interventional Cardiology / Endovascular Specialist  [place] Office : [phone] [place] [address] [place] [address]  Tel:   [place] Office : [phone] [facility] [place] [address]  Tel: [phone]    Pt lying in bed on HFNC   	  MEDICATIONS:  amLODIPine   Tablet 5 milliGRAM(s) Oral daily  aspirin  chewable 81 milliGRAM(s) Oral daily  furosemide   Injectable 10 milliGRAM(s) IV Push daily  heparin   Injectable 5000 Unit(s) SubCutaneous every 8 hours  metoprolol tartrate 25 milliGRAM(s) Oral two times a day  ranolazine 500 milliGRAM(s) Oral two times a day  ticagrelor 60 milliGRAM(s) Oral every 12 hours    piperacillin/tazobactam IVPB.. 3.375 Gram(s) IV Intermittent every 8 hours    albuterol/ipratropium for Nebulization 3 milliLiter(s) Nebulizer every 6 hours  guaiFENesin Oral Liquid (Sugar-Free) 100 milliGRAM(s) Oral every 6 hours PRN  sodium chloride 3%  Inhalation 4 milliLiter(s) Inhalation every 6 hours    acetaminophen     Tablet .. 650 milliGRAM(s) Oral every 6 hours PRN  escitalopram 10 milliGRAM(s) Oral daily  levETIRAcetam  IVPB 250 milliGRAM(s) IV Intermittent every 12 hours    pantoprazole  Injectable 40 milliGRAM(s) IV Push every 12 hours  sucralfate 1 Gram(s) Oral every 12 hours    insulin glargine Injectable (LANTUS) 7 Unit(s) SubCutaneous at bedtime  insulin lispro (ADMELOG) corrective regimen sliding scale   SubCutaneous every 6 hours    chlorhexidine 2% Cloths 1 Application(s) Topical daily  lidocaine   4% Patch 1 Patch Transdermal every 24 hours  mupirocin 2% Ointment 1 Application(s) Topical two times a day      PAST MEDICAL/SURGICAL HISTORY  PAST MEDICAL & SURGICAL HISTORY:  Hyperlipemia      Hypertension      Coronary Artery Disease      Diabetes Mellitus Type II      Stented Coronary Artery  total 5 stents, last stent 5/2019      Neuropathy      Myocardial infarction      Stroke  mild left facial numbness   no other residuals verbalized      Myoclonic jerking      Stage 3 chronic kidney disease      History of Cataract Extraction      Hx of CABG      H/O coronary angiogram      S/P coronary artery stent placement  1/6/09      S/P placement of cardiac pacemaker          SOCIAL HISTORY: Substance Use (street drugs): ( x ) never used  (  ) other:    FAMILY HISTORY:  No pertinent family history in first degree relatives        REVIEW OF SYSTEMS:  CONSTITUTIONAL: No fever, weight loss, or fatigue  EYES: No eye pain, visual disturbances, or discharge  ENMT:  No difficulty hearing, tinnitus, vertigo; No sinus or throat pain  BREASTS: No pain, masses, or nipple discharge  GASTROINTESTINAL: No abdominal or epigastric pain. No nausea, vomiting, or hematemesis; No diarrhea or constipation. No melena or hematochezia.  GENITOURINARY: No dysuria, frequency, hematuria, or incontinence  NEUROLOGICAL: No headaches, memory loss, loss of strength, numbness, or tremors  ENDOCRINE: No heat or cold intolerance; No hair loss  MUSCULOSKELETAL: No joint pain or swelling; No muscle, back, or extremity pain  PSYCHIATRIC: No depression, anxiety, mood swings, or difficulty sleeping  HEME/LYMPH: No easy bruising, or bleeding gums  All others negative    PHYSICAL EXAM:  T(C): 37.6 (01-20-24 @ 20:00), Max: 38.2 (01-20-24 @ 12:00)  HR: 83 (01-20-24 @ 22:14) (83 - 99)  BP: 108/51 (01-20-24 @ 22:00) (108/51 - 146/59)  RR: 21 (01-20-24 @ 22:00) (14 - 31)  SpO2: 100% (01-20-24 @ 22:14) (92% - 100%)  Wt(kg): --  I&O's Summary    19 Jan 2024 07:01  -  20 Jan 2024 07:00  --------------------------------------------------------  IN: 50 mL / OUT: 530 mL / NET: -480 mL    20 Jan 2024 07:01  -  20 Jan 2024 23:19  --------------------------------------------------------  IN: 300 mL / OUT: 570 mL / NET: -270 mL        EYES:   PERRLA   ENMT:   Moist mucous membranes, Good dentition, No lesions  Cardiovascular: Normal S1 S2, No JVD, No murmurs, No edema  Respiratory: b/l rhonchi   Gastrointestinal:  Soft, Non-tender, + BS	  Extremities: no edema                              8.7    16.20 )-----------( 271      ( 20 Jan 2024 01:00 )             27.3     01-20    138  |  100  |  16  ----------------------------<  159<H>  4.1   |  26  |  1.63<H>    Ca    8.1<L>      20 Jan 2024 01:00  Phos  3.4     01-20  Mg     1.60     01-20    TPro  6.3  /  Alb  2.4<L>  /  TBili  0.5  /  DBili  x   /  AST  17  /  ALT  14  /  AlkPhos  50  01-20    proBNP:   Lipid Profile:   HgA1c:   TSH:     Consultant(s) Notes Reviewed:  [x ] YES  [ ] NO    Care Discussed with Consultants/Other Providers [ x] YES  [ ] NO    Imaging Personally Reviewed independently:  [x] YES  [ ] NO    All labs, radiologic studies, vitals, orders and medications list reviewed. Patient is seen and examined at bedside. Case discussed with medical team.

## 2024-01-20 NOTE — PROGRESS NOTE ADULT - ASSESSMENT
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     PLAN  =====Neurologic=====  #CVA  - prior CVA x3 with no residual deficits  #myoclonic jerks  - on Keppra will continue  - holding home lexapro gabapentin and memantine given persistent secretions/ cough     =====Pulmonary=====  #COVID  - s/p paxlovid and 1 dose remdesivir while inpatient  #Pleural effusions b/l  - CT chest from 1/16 showing new small bilateral pleural effusions with associated complete collapse  of the right middle and right lower lobes and partial atelectasis of the  right upper and left lower lobes. patchy bilateral ground-glass opacities are consistent with pulmonary edema. layering secretions in the trachea, left mainstem bronchus, and right lower lobe bronchus  - currently on cefepime and azithro for empiric PNA coverage will switch to zosyn and dc azithro   - s/p bronch for increasing secretions- on Mucomyst, hypertonic saline, albuterol, guaifenesin, mometasone will continue   - currently on high-flow 60% 50L, will wean,  fu ABGs worsening hypercapnia possible intubation pending GOC    =====Cardiovascular=====  Patient does not currently require vasopressors and is normotensive  #HTN  - on home amlodipine and metoprolol, will continue here when able to tolerate PO    #CAD  - on Brilinta aspirin and ranolazine continue when able to tolerate PO     =====GI=====  #upper GI bleed  - s/p EGD showing dieulafoy lesion and esophagitis likely 2/2 NGT irritation s/p 2 clips  - on protonix IV BID continue     #Diet  - DASH diet if tolerates     =====Renal/=====  #Electrolytes  - Maintain K>4, Phos>3, Mag>2, iCal>1  - baseline cr 1.5, at baseline now      =====Endocrine=====  #DM2  - on lantus 14units and SSI  - a1c 9.3, glucose wnl inpatient     =====Infectious Disease=====  #COVID   - s/p paxlovid and 1 dose remdesivir  # PNA  - CT chest showing ground glass opacities  - treatment with azithro and cefepime currently- switch to zosyn on 1/20 for aspiration coverage, dc azithro   - f/u urine legionella  - no white count or fever thus far  - f/u bronchial cultures   - f/u blood cultures given fever overnight on 1/20    =====Heme/Onc=====  #DVT Ppx  - Lovenox 40mg SQ qd    =====Ethics=====  FULL CODE.

## 2024-01-20 NOTE — PROGRESS NOTE ADULT - SUBJECTIVE AND OBJECTIVE BOX
Date of Service  : 01-20-24     INTERVAL HPI/OVERNIGHT EVENTS: Getting nasal suction as lot of secretions. Sons by bedside.   Vital Signs Last 24 Hrs  T(C): 38.2 (20 Jan 2024 12:00), Max: 38.2 (20 Jan 2024 12:00)  T(F): 100.8 (20 Jan 2024 12:00), Max: 100.8 (20 Jan 2024 12:00)  HR: 94 (20 Jan 2024 15:00) (85 - 107)  BP: 135/54 (20 Jan 2024 15:00) (109/44 - 150/131)  BP(mean): 76 (20 Jan 2024 15:00) (63 - 139)  RR: 23 (20 Jan 2024 15:00) (11 - 31)  SpO2: 96% (20 Jan 2024 15:00) (86% - 100%)    Parameters below as of 20 Jan 2024 15:00  Patient On (Oxygen Delivery Method): nasal cannula, high flow  O2 Flow (L/min): 50  O2 Concentration (%): 60  I&O's Summary    19 Jan 2024 07:01  -  20 Jan 2024 07:00  --------------------------------------------------------  IN: 50 mL / OUT: 530 mL / NET: -480 mL    20 Jan 2024 07:01  -  20 Jan 2024 15:49  --------------------------------------------------------  IN: 100 mL / OUT: 375 mL / NET: -275 mL      MEDICATIONS  (STANDING):  albuterol/ipratropium for Nebulization 3 milliLiter(s) Nebulizer every 6 hours  amLODIPine   Tablet 5 milliGRAM(s) Oral daily  aspirin  chewable 81 milliGRAM(s) Oral daily  chlorhexidine 2% Cloths 1 Application(s) Topical daily  escitalopram 10 milliGRAM(s) Oral daily  furosemide   Injectable 10 milliGRAM(s) IV Push daily  heparin   Injectable 5000 Unit(s) SubCutaneous every 8 hours  insulin glargine Injectable (LANTUS) 14 Unit(s) SubCutaneous at bedtime  insulin lispro (ADMELOG) corrective regimen sliding scale   SubCutaneous every 6 hours  levETIRAcetam  IVPB 250 milliGRAM(s) IV Intermittent every 12 hours  metoprolol tartrate 25 milliGRAM(s) Oral two times a day  mupirocin 2% Ointment 1 Application(s) Topical two times a day  pantoprazole  Injectable 40 milliGRAM(s) IV Push every 12 hours  piperacillin/tazobactam IVPB.- 3.375 Gram(s) IV Intermittent once  piperacillin/tazobactam IVPB.. 3.375 Gram(s) IV Intermittent every 8 hours  ranolazine 500 milliGRAM(s) Oral two times a day  sodium chloride 3%  Inhalation 4 milliLiter(s) Inhalation every 6 hours  sucralfate 1 Gram(s) Oral every 12 hours  ticagrelor 60 milliGRAM(s) Oral every 12 hours    MEDICATIONS  (PRN):  acetaminophen     Tablet .. 650 milliGRAM(s) Oral every 6 hours PRN Temp greater or equal to 38C (100.4F), Mild Pain (1 - 3), Moderate Pain (4 - 6)  guaiFENesin Oral Liquid (Sugar-Free) 100 milliGRAM(s) Oral every 6 hours PRN Cough    LABS:                        8.7    16.20 )-----------( 271      ( 20 Jan 2024 01:00 )             27.3     01-20    138  |  100  |  16  ----------------------------<  159<H>  4.1   |  26  |  1.63<H>    Ca    8.1<L>      20 Jan 2024 01:00  Phos  3.4     01-20  Mg     1.60     01-20    TPro  6.3  /  Alb  2.4<L>  /  TBili  0.5  /  DBili  x   /  AST  17  /  ALT  14  /  AlkPhos  50  01-20    PT/INR - ( 20 Jan 2024 01:00 )   PT: 13.0 sec;   INR: 1.16 ratio         PTT - ( 20 Jan 2024 01:00 )  PTT:32.2 sec  Urinalysis Basic - ( 20 Jan 2024 01:00 )    Color: x / Appearance: x / SG: x / pH: x  Gluc: 159 mg/dL / Ketone: x  / Bili: x / Urobili: x   Blood: x / Protein: x / Nitrite: x   Leuk Esterase: x / RBC: x / WBC x   Sq Epi: x / Non Sq Epi: x / Bacteria: x      CAPILLARY BLOOD GLUCOSE      POCT Blood Glucose.: 110 mg/dL (20 Jan 2024 13:18)  POCT Blood Glucose.: 136 mg/dL (20 Jan 2024 05:22)  POCT Blood Glucose.: 148 mg/dL (20 Jan 2024 00:50)  POCT Blood Glucose.: 129 mg/dL (19 Jan 2024 18:55)      ABG - ( 20 Jan 2024 01:00 )  pH, Arterial: 7.40  pH, Blood: x     /  pCO2: 47    /  pO2: 133   / HCO3: 29    / Base Excess: 3.8   /  SaO2: 98.2              Urinalysis Basic - ( 20 Jan 2024 01:00 )    Color: x / Appearance: x / SG: x / pH: x  Gluc: 159 mg/dL / Ketone: x  / Bili: x / Urobili: x   Blood: x / Protein: x / Nitrite: x   Leuk Esterase: x / RBC: x / WBC x   Sq Epi: x / Non Sq Epi: x / Bacteria: x          RADIOLOGY & ADDITIONAL TESTS:    Consultant(s) Notes Reviewed:  [x ] YES  [ ] NO    PHYSICAL EXAM:  GENERAL: Not in any distress ,HF NC   HEAD:  Atraumatic, Normocephalic  NECK: Supple, No JVD, Normal thyroid  NERVOUS SYSTEM:  Alert & No focal deficit   CHEST/LUNG: Good air entry bilateral except bases   HEART: Regular rate and rhythm; No murmurs, rubs, or gallops  ABDOMEN: Soft, Nontender, Nondistended; Bowel sounds present  EXTREMITIES:  + edema      Care Discussed with Consultants/Other Providers [ x] YES  [ ] NO

## 2024-01-20 NOTE — PROGRESS NOTE ADULT - SUBJECTIVE AND OBJECTIVE BOX
Nephrology Progress note    Patient is a 89y male with    Allergies:  fluoroquinolone antibiotics (Other)  Cipro (Unknown)  Tegretol (Unknown)  carbamazepine (Other)    Hospital Medications:   MEDICATIONS  (STANDING):  albuterol/ipratropium for Nebulization 3 milliLiter(s) Nebulizer every 6 hours  amLODIPine   Tablet 5 milliGRAM(s) Oral daily  aspirin  chewable 81 milliGRAM(s) Oral daily  chlorhexidine 2% Cloths 1 Application(s) Topical daily  escitalopram 10 milliGRAM(s) Oral daily  furosemide   Injectable 10 milliGRAM(s) IV Push daily  heparin   Injectable 5000 Unit(s) SubCutaneous every 8 hours  insulin glargine Injectable (LANTUS) 14 Unit(s) SubCutaneous at bedtime  insulin lispro (ADMELOG) corrective regimen sliding scale   SubCutaneous every 6 hours  levETIRAcetam  IVPB 250 milliGRAM(s) IV Intermittent every 12 hours  metoprolol tartrate 25 milliGRAM(s) Oral two times a day  mupirocin 2% Ointment 1 Application(s) Topical two times a day  pantoprazole  Injectable 40 milliGRAM(s) IV Push every 12 hours  piperacillin/tazobactam IVPB.- 3.375 Gram(s) IV Intermittent once  piperacillin/tazobactam IVPB.. 3.375 Gram(s) IV Intermittent every 8 hours  ranolazine 500 milliGRAM(s) Oral two times a day  sodium chloride 3%  Inhalation 4 milliLiter(s) Inhalation every 6 hours  sucralfate 1 Gram(s) Oral every 12 hours  ticagrelor 60 milliGRAM(s) Oral every 12 hours      VITALS:  T(F): 100.8 (01-20-24 @ 12:00), Max: 100.8 (01-20-24 @ 12:00)  HR: 97 (01-20-24 @ 13:00)  BP: 131/50 (01-20-24 @ 13:00)  RR: 25 (01-20-24 @ 13:00)  SpO2: 95% (01-20-24 @ 13:00)    01-18 @ 07:01 - 01-19 @ 07:00  --------------------------------------------------------  IN: 580 mL / OUT: 450 mL / NET: 130 mL    01-19 @ 07:01 - 01-20 @ 07:00  --------------------------------------------------------  IN: 50 mL / OUT: 530 mL / NET: -480 mL    01-20 @ 07:01 - 01-20 @ 14:54  --------------------------------------------------------  IN: 100 mL / OUT: 375 mL / NET: -275 mL        PHYSICAL EXAM:  Constitutional: NAD  HEENT: anicteric sclera, oropharynx clear, MMM  Neck: No JVD  Respiratory: CTAB, no wheezes, rales or rhonchi  Cardiovascular: S1, S2, RRR  Gastrointestinal: BS+, soft, NT/ND  Extremities: No cyanosis or clubbing. No peripheral edema  Neurological: A/O x 3  Psychiatric: Normal mood, normal affect  : No CVA tenderness. No delong.   Skin: No rashes      LABS:  01-20    138  |  100  |  16  ----------------------------<  159<H>  4.1   |  26  |  1.63<H>    Ca    8.1<L>      20 Jan 2024 01:00  Phos  3.4     01-20  Mg     1.60     01-20    TPro  6.3  /  Alb  2.4<L>  /  TBili  0.5  /  DBili      /  AST  17  /  ALT  14  /  AlkPhos  50  01-20                          8.7    16.20 )-----------( 271      ( 20 Jan 2024 01:00 )             27.3               RADIOLOGY & ADDITIONAL STUDIES:  R CHEST PORTABLE       PROCEDURE DATE:  01/19/2024          INTERPRETATION:  EXAMINATION: XR CHEST URGENT    CLINICAL INDICATION: Desaturation    TECHNIQUE: Single frontal, portable view ofthe chest was obtained.    COMPARISON: Chest x-ray 1/19/2024.    FINDINGS:  Partial visualization of a left-sided cardiac device leads in right   atrium and right ventricle.  Sternotomy wires.  The heart is not accurately assessed in this AP projection.  Moderate-sized right pleural effusion, slightly worsened compared to   prior.  Trace left pleural effusion.  No acute bony abnormality.    IMPRESSION:  Moderate-sized right pleural effusion, slightly worsened compared to   prior.    --- End of Report ---   Nephrology Progress note    [facility] is a 89y [facility]    Allergies:  fluoroquinolone antibiotics (Other)  Cipro (Unknown)  Tegretol (Unknown)  carbamazepine (Other)    Hospital Medications:   MEDICATIONS  (STANDING):  albuterol/ipratropium for Nebulization 3 milliLiter(s) Nebulizer every 6 hours  amLODIPine   Tablet 5 milliGRAM(s) Oral daily  aspirin  chewable 81 milliGRAM(s) Oral daily  chlorhexidine 2% Cloths 1 Application(s) Topical daily  escitalopram 10 milliGRAM(s) Oral daily  furosemide   Injectable 10 milliGRAM(s) IV Push daily  heparin   Injectable 5000 Unit(s) SubCutaneous every 8 hours  insulin glargine Injectable (LANTUS) 14 Unit(s) SubCutaneous at bedtime  insulin lispro (ADMELOG) corrective regimen sliding scale   SubCutaneous every 6 hours  levETIRAcetam  IVPB 250 milliGRAM(s) IV Intermittent every 12 hours  metoprolol tartrate 25 milliGRAM(s) Oral two times a day  mupirocin 2% Ointment 1 Application(s) Topical two times a day  pantoprazole  Injectable 40 milliGRAM(s) IV Push every 12 hours  piperacillin/tazobactam IVPB.- 3.375 Gram(s) IV Intermittent once  piperacillin/tazobactam IVPB.. 3.375 Gram(s) IV Intermittent every 8 hours  ranolazine 500 milliGRAM(s) Oral two times a day  sodium chloride 3%  Inhalation 4 milliLiter(s) Inhalation every 6 hours  sucralfate 1 Gram(s) Oral every 12 hours  ticagrelor 60 milliGRAM(s) Oral every 12 hours      VITALS:  T(F): 100.8 (01-20-24 @ 12:00), Max: 100.8 (01-20-24 @ 12:00)  HR: 97 (01-20-24 @ 13:00)  BP: 131/50 (01-20-24 @ 13:00)  RR: 25 (01-20-24 @ 13:00)  SpO2: 95% (01-20-24 @ 13:00)    01-18 @ 07:01  -  01-19 @ 07:00  --------------------------------------------------------  IN: 580 mL / OUT: 450 mL / NET: 130 mL    01-19 @ 07:01 - 01-20 @ 07:00  --------------------------------------------------------  IN: 50 mL / OUT: 530 mL / NET: -480 mL    01-20 @ 07:01 - 01-20 @ 14:54  --------------------------------------------------------  IN: 100 mL / OUT: 375 mL / NET: -275 mL        PHYSICAL EXAM:  Constitutional: NAD  HEENT: anicteric sclera, oropharynx clear, MMM  Neck: No JVD  Respiratory: +rhonchi  Cardiovascular: S1, S2, RRR  Gastrointestinal: BS+, soft, NT/ND  Extremities: trace peripheral edema  Neurological: A/O x 3  Psychiatric: Normal mood, normal affect  : No CVA tenderness. No delong.   Skin: No rashes      LABS:  01-20    138  |  100  |  16  ----------------------------<  159<H>  4.1   |  26  |  1.63<H>    Ca    8.1<L>      20 Jan 2024 01:00  Phos  3.4     01-20  Mg     1.60     01-20    TPro  6.3  /  Alb  2.4<L>  /  TBili  0.5  /  DBili      /  AST  17  /  ALT  14  /  AlkPhos  50  01-20                          8.7    16.20 )-----------( 271      ( 20 Jan 2024 01:00 )             27.3               RADIOLOGY & ADDITIONAL STUDIES:  R CHEST PORTABLE       PROCEDURE DATE:  01/19/2024          INTERPRETATION:  EXAMINATION: XR CHEST URGENT    CLINICAL INDICATION: Desaturation    TECHNIQUE: Single frontal, portable view ofthe chest was obtained.    COMPARISON: Chest x-ray 1/19/2024.    FINDINGS:  Partial visualization of a left-sided cardiac device leads in right   atrium and right ventricle.  Sternotomy wires.  The heart is not accurately assessed in this AP projection.  Moderate-sized right pleural effusion, slightly worsened compared to   prior.  Trace left pleural effusion.  No acute bony abnormality.    IMPRESSION:  Moderate-sized right pleural effusion, slightly worsened compared to   prior.    --- End of Report ---

## 2024-01-20 NOTE — PROGRESS NOTE ADULT - SUBJECTIVE AND OBJECTIVE BOX
Date of Service: 01-20-24 @ 10:36    Patient is a 89y old  Male who presents with a chief complaint of COVID19, Chest pain (20 Jan 2024 07:02)      Any change in ROS: s/p bronch  on high flow: 1005 high flow:  he is alert and awake:   has gurgling sound in his throat:     MEDICATIONS  (STANDING):  albuterol/ipratropium for Nebulization 3 milliLiter(s) Nebulizer every 6 hours  amLODIPine   Tablet 5 milliGRAM(s) Oral daily  aspirin  chewable 81 milliGRAM(s) Oral daily  chlorhexidine 2% Cloths 1 Application(s) Topical daily  escitalopram 10 milliGRAM(s) Oral daily  furosemide   Injectable 10 milliGRAM(s) IV Push daily  heparin   Injectable 5000 Unit(s) SubCutaneous every 8 hours  insulin glargine Injectable (LANTUS) 14 Unit(s) SubCutaneous at bedtime  insulin lispro (ADMELOG) corrective regimen sliding scale   SubCutaneous every 6 hours  levETIRAcetam  IVPB 250 milliGRAM(s) IV Intermittent every 12 hours  metoprolol tartrate 25 milliGRAM(s) Oral two times a day  mupirocin 2% Ointment 1 Application(s) Topical two times a day  pantoprazole  Injectable 40 milliGRAM(s) IV Push every 12 hours  piperacillin/tazobactam IVPB. 3.375 Gram(s) IV Intermittent once  piperacillin/tazobactam IVPB.- 3.375 Gram(s) IV Intermittent once  piperacillin/tazobactam IVPB.. 3.375 Gram(s) IV Intermittent every 8 hours  ranolazine 500 milliGRAM(s) Oral two times a day  sodium chloride 3%  Inhalation 4 milliLiter(s) Inhalation every 6 hours  sucralfate 1 Gram(s) Oral every 12 hours  ticagrelor 60 milliGRAM(s) Oral every 12 hours    MEDICATIONS  (PRN):  acetaminophen     Tablet .. 650 milliGRAM(s) Oral every 6 hours PRN Temp greater or equal to 38C (100.4F), Mild Pain (1 - 3), Moderate Pain (4 - 6)  guaiFENesin Oral Liquid (Sugar-Free) 100 milliGRAM(s) Oral every 6 hours PRN Cough    Vital Signs Last 24 Hrs  T(C): 37.9 (20 Jan 2024 08:01), Max: 38.1 (19 Jan 2024 16:02)  T(F): 100.2 (20 Jan 2024 08:01), Max: 100.5 (19 Jan 2024 16:02)  HR: 91 (20 Jan 2024 10:19) (80 - 107)  BP: 129/47 (20 Jan 2024 08:00) (109/44 - 150/131)  BP(mean): 70 (20 Jan 2024 08:00) (63 - 139)  RR: 18 (20 Jan 2024 10:19) (11 - 27)  SpO2: 100% (20 Jan 2024 10:19) (86% - 100%)    Parameters below as of 20 Jan 2024 10:19  Patient On (Oxygen Delivery Method): nasal cannula, high flow  O2 Flow (L/min): 50  O2 Concentration (%): 100    I&O's Summary    19 Jan 2024 07:01  -  20 Jan 2024 07:00  --------------------------------------------------------  IN: 0 mL / OUT: 500 mL / NET: -500 mL          Physical Exam:   GENERAL: NAD, well-groomed, well-developed  HEENT: JAVAD/   Atraumatic, Normocephalic  ENMT: No tonsillar erythema, exudates, or enlargement; Moist mucous membranes, Good dentition, No lesions  NECK: Supple, No JVD, Normal thyroid  CHEST/LUNG: decreased air entry bilaterally:   CVS: Regular rate and rhythm; No murmurs, rubs, or gallops  GI: : Soft, Nontender, Nondistended; Bowel sounds present  NERVOUS SYSTEM:  Alert & awake and responding to simple commands;   EXTREMITIES: - edema  LYMPH: No lymphadenopathy noted  SKIN: No rashes or lesions  ENDOCRINOLOGY: No Thyromegaly  PSYCH: calm    Labs:  ABG - ( 20 Jan 2024 01:00 )  pH, Arterial: 7.40  pH, Blood: x     /  pCO2: 47    /  pO2: 133   / HCO3: 29    / Base Excess: 3.8   /  SaO2: 98.2            37                            8.7    16.20 )-----------( 271      ( 20 Jan 2024 01:00 )             27.3                         8.9    8.25  )-----------( 284      ( 19 Jan 2024 06:16 )             27.7                         8.9    7.40  )-----------( 277      ( 18 Jan 2024 06:17 )             27.9                         9.0    7.43  )-----------( 254      ( 17 Jan 2024 06:17 )             28.5     01-20    138  |  100  |  16  ----------------------------<  159<H>  4.1   |  26  |  1.63<H>  01-19    142  |  101  |  14  ----------------------------<  60<L>  3.9   |  29  |  1.54<H>  01-18    139  |  101  |  15  ----------------------------<  105<H>  3.5   |  29  |  1.55<H>  01-17    141  |  101  |  16  ----------------------------<  103<H>  3.3<L>   |  31  |  1.57<H>    Ca    8.1<L>      20 Jan 2024 01:00  Ca    8.4      19 Jan 2024 06:16  Phos  3.4     01-20  Phos  3.0     01-19  Mg     1.60     01-20  Mg     1.80     01-19    TPro  6.3  /  Alb  2.4<L>  /  TBili  0.5  /  DBili  x   /  AST  17  /  ALT  14  /  AlkPhos  50  01-20    CAPILLARY BLOOD GLUCOSE      POCT Blood Glucose.: 136 mg/dL (20 Jan 2024 05:22)  POCT Blood Glucose.: 148 mg/dL (20 Jan 2024 00:50)  POCT Blood Glucose.: 129 mg/dL (19 Jan 2024 18:55)  POCT Blood Glucose.: 130 mg/dL (19 Jan 2024 14:47)  POCT Blood Glucose.: 103 mg/dL (19 Jan 2024 13:25)  POCT Blood Glucose.: 105 mg/dL (19 Jan 2024 11:55)      LIVER FUNCTIONS - ( 20 Jan 2024 01:00 )  Alb: 2.4 g/dL / Pro: 6.3 g/dL / ALK PHOS: 50 U/L / ALT: 14 U/L / AST: 17 U/L / GGT: x           PT/INR - ( 20 Jan 2024 01:00 )   PT: 13.0 sec;   INR: 1.16 ratio         PTT - ( 20 Jan 2024 01:00 )  PTT:32.2 sec  Urinalysis Basic - ( 20 Jan 2024 01:00 )    Color: x / Appearance: x / SG: x / pH: x  Gluc: 159 mg/dL / Ketone: x  / Bili: x / Urobili: x   Blood: x / Protein: x / Nitrite: x   Leuk Esterase: x / RBC: x / WBC x   Sq Epi: x / Non Sq Epi: x / Bacteria: x            RECENT CULTURES:  01-19 @ 11:35 .Bronchial R MIDDLE LOBE       Moderate polymorphonuclear leukocytes per low power field  No squamous epithelial cells per low power field  Numerous Gram Negative Rods per oil power field  Few Gram Positive Cocci in Clusters per oil power field           Testing in progress          RESPIRATORY CULTURES:    r< from: Xray Chest 1 View- PORTABLE-Urgent (Xray Chest 1 View- PORTABLE-Urgent .) (01.19.24 @ 14:37) >    ACC: [id number] EXAM:  XR CHEST PORTABLE URGENT 1V   ORDERED BY: PRETTY CHAPA     PROCEDURE DATE:  01/19/2024          INTERPRETATION:  EXAMINATION: XR CHEST URGENT    CLINICAL INDICATION: Desaturation    TECHNIQUE: Single frontal, portable view ofthe chest was obtained.    COMPARISON: Chest x-ray 1/19/2024.    FINDINGS:  Partial visualization of a left-sided cardiac device leads in right   atrium and right ventricle.  Sternotomy wires.  The heart is not accurately assessed in this AP projection.  Moderate-sized right pleural effusion, slightly worsened compared to   prior.  Trace left pleural effusion.  No acute bony abnormality.    IMPRESSION:  Moderate-sized right pleural effusion, slightly worsened compared to   prior.    --- End of Report ---          MARJAN DONOHUE DO; Resident Radiologist  This document has been electronically signed.  AMELIA ANGLIN MD; Attending Radiologist  This document has been electronically signed. Jan 20 2024  9:49AM    < end of copied text >  < from: Xray Chest 1 View- PORTABLE-Urgent (Xray Chest 1 View- PORTABLE-Urgent .) (01.19.24 @ 12:49) >    ACC: [id number] EXAM:  XR CHEST PORTABLE URGENT 1V   ORDERED BY: KEYA JONES     PROCEDURE DATE:  01/19/2024          INTERPRETATION:  CLINICAL INFORMATION: Post bronchoscopy for airway   clearance    TIME OF EXAMINATION: January 19 at 12:30 PM    EXAM: Portable chest    FINDINGS:  Hazy lung bases right greater than left secondary to effusions with   underlying atelectasis.  Visualized lungs are clear.  Sternotomy wires and pacemaker are in place.  No pneumothorax or pneumomediastinum.        COMPARISON: January 9, 2024        IMPRESSION: Status post bronchoscopy without complicating   pneumomediastinum or pneumothorax.    --- End of Report ---            AMELIA ANGLIN MD; Attending Radiologist  This document has been electronically signed. Jan 19 2024  1:19PM    < end of copied text >        Studies  Chest X-RAY  CT SCAN Chest   Venous Dopplers: LE:   CT Abdomen  Others

## 2024-01-20 NOTE — PROGRESS NOTE ADULT - ATTENDING COMMENTS
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     #Acute hypoxemic respiratory failure  #Staph Aureus Pneumonia  #Dysphagia  - c/w HFNC, wean as tolerated  - Will c/w Duonebs q6h, 3% q12h. Will add IPV for aggressive airway clearance  - Keep NPO. Recommended for dysphagia diet but wet/gurgly voice still. Place KO tube  - Will change abx to Zosyn  - f/u cultures  - Will check EKG, tele strip reading intermittent Afib, no history  - PT tracy Goode MD  Pulmonary & Critical Care

## 2024-01-20 NOTE — PROGRESS NOTE ADULT - SUBJECTIVE AND OBJECTIVE BOX
PROGRESS NOTE:   Authored by Dr. Beata Wahl MD (PGY-1). Pager [facility] [phone] / KATHIA ( [phone]) or via TEAMS    Patient is a 89y old  Male who presents with a chief complaint of COVID19, Chest pain (19 Jan 2024 15:20)      SUBJECTIVE / OVERNIGHT EVENTS:  No acute events overnight.     ADDITIONAL REVIEW OF SYSTEMS:  Patient denies fevers, chills, chest pain, shortness of breath, nausea, abdominal pain, diarrhea, constipation, dysuria, leg swelling, headache, light headedness.    MEDICATIONS  (STANDING):  acetylcysteine 20%  Inhalation 4 milliLiter(s) Inhalation every 6 hours  albuterol/ipratropium for Nebulization 3 milliLiter(s) Nebulizer every 6 hours  amLODIPine   Tablet 5 milliGRAM(s) Oral daily  aspirin  chewable 81 milliGRAM(s) Oral daily  azithromycin  IVPB 500 milliGRAM(s) IV Intermittent every 24 hours  cefepime   IVPB 1000 milliGRAM(s) IV Intermittent every 12 hours  cefepime   IVPB      chlorhexidine 2% Cloths 1 Application(s) Topical daily  escitalopram 10 milliGRAM(s) Oral daily  furosemide   Injectable 10 milliGRAM(s) IV Push daily  heparin   Injectable 5000 Unit(s) SubCutaneous every 8 hours  insulin glargine Injectable (LANTUS) 14 Unit(s) SubCutaneous at bedtime  insulin lispro (ADMELOG) corrective regimen sliding scale   SubCutaneous every 6 hours  levETIRAcetam  IVPB 250 milliGRAM(s) IV Intermittent every 12 hours  metoprolol tartrate 25 milliGRAM(s) Oral two times a day  mometasone 110 MICROgram(s) Inhaler 2 Puff(s) Inhalation every 12 hours  mupirocin 2% Ointment 1 Application(s) Topical two times a day  pantoprazole  Injectable 40 milliGRAM(s) IV Push every 12 hours  ranolazine 500 milliGRAM(s) Oral two times a day  sodium chloride 3%  Inhalation 4 milliLiter(s) Inhalation every 6 hours  sucralfate 1 Gram(s) Oral every 12 hours  ticagrelor 60 milliGRAM(s) Oral every 12 hours    MEDICATIONS  (PRN):  acetaminophen     Tablet .. 650 milliGRAM(s) Oral every 6 hours PRN Temp greater or equal to 38C (100.4F), Mild Pain (1 - 3), Moderate Pain (4 - 6)  guaiFENesin Oral Liquid (Sugar-Free) 100 milliGRAM(s) Oral every 6 hours PRN Cough      CAPILLARY BLOOD GLUCOSE      POCT Blood Glucose.: 136 mg/dL (20 Jan 2024 05:22)  POCT Blood Glucose.: 148 mg/dL (20 Jan 2024 00:50)  POCT Blood Glucose.: 129 mg/dL (19 Jan 2024 18:55)  POCT Blood Glucose.: 130 mg/dL (19 Jan 2024 14:47)  POCT Blood Glucose.: 103 mg/dL (19 Jan 2024 13:25)  POCT Blood Glucose.: 105 mg/dL (19 Jan 2024 11:55)  POCT Blood Glucose.: 138 mg/dL (19 Jan 2024 10:00)  POCT Blood Glucose.: 69 mg/dL (19 Jan 2024 08:59)  POCT Blood Glucose.: 66 mg/dL (19 Jan 2024 08:54)  POCT Blood Glucose.: 67 mg/dL (19 Jan 2024 08:53)    I&O's Summary    19 Jan 2024 07:01  -  20 Jan 2024 07:00  --------------------------------------------------------  IN: 0 mL / OUT: 500 mL / NET: -500 mL        PHYSICAL EXAM:  Vital Signs Last 24 Hrs  T(C): 37.4 (20 Jan 2024 04:00), Max: 38.1 (19 Jan 2024 16:02)  T(F): 99.4 (20 Jan 2024 04:00), Max: 100.5 (19 Jan 2024 16:02)  HR: 99 (20 Jan 2024 06:00) (71 - 107)  BP: 120/72 (20 Jan 2024 06:00) (109/44 - 150/131)  BP(mean): 82 (20 Jan 2024 06:00) (63 - 139)  RR: 26 (20 Jan 2024 06:00) (11 - 27)  SpO2: 92% (20 Jan 2024 06:00) (86% - 100%)    Parameters below as of 20 Jan 2024 04:00  Patient On (Oxygen Delivery Method): nasal cannula, high flow  O2 Flow (L/min): 50  O2 Concentration (%): 100    CONSTITUTIONAL: NAD, well-developed  RESPIRATORY: Normal respiratory effort; lungs are clear to auscultation bilaterally  CARDIOVASCULAR: Regular rate and rhythm, normal S1 and S2, no murmur/rub/gallop; No lower extremity edema; Peripheral pulses are 2+ bilaterally  ABDOMEN: Nontender to palpation, normoactive bowel sounds, no rebound/guarding; No hepatosplenomegaly  MSK: no clubbing or cyanosis of digits; no joint swelling or tenderness to palpation  PSYCH: A+O to person, place, and time; affect appropriate    LABS:                        8.7    16.20 )-----------( 271      ( 20 Jan 2024 01:00 )             27.3     01-20    138  |  100  |  16  ----------------------------<  159<H>  4.1   |  26  |  1.63<H>    Ca    8.1<L>      20 Jan 2024 01:00  Phos  3.4     01-20  Mg     1.60     01-20    TPro  6.3  /  Alb  2.4<L>  /  TBili  0.5  /  DBili  x   /  AST  17  /  ALT  14  /  AlkPhos  50  01-20    PT/INR - ( 20 Jan 2024 01:00 )   PT: 13.0 sec;   INR: 1.16 ratio         PTT - ( 20 Jan 2024 01:00 )  PTT:32.2 sec      Urinalysis Basic - ( 20 Jan 2024 01:00 )    Color: x / Appearance: x / SG: x / pH: x  Gluc: 159 mg/dL / Ketone: x  / Bili: x / Urobili: x   Blood: x / Protein: x / Nitrite: x   Leuk Esterase: x / RBC: x / WBC x   Sq Epi: x / Non Sq Epi: x / Bacteria: x        Culture - Bronchial (collected 19 Jan 2024 11:35)  Source: .Bronchial R MIDDLE LOBE  Gram Stain (19 Jan 2024 23:37):    Moderate polymorphonuclear leukocytes per low power field    No squamous epithelial cells per low power field    Numerous Gram Negative Rods per oil power field    Few Gram Positive Cocci in Clusters per oil power field        Tele Reviewed:    RADIOLOGY & ADDITIONAL TESTS:  Results Reviewed:   Imaging Personally Reviewed:  Electrocardiogram Personally Reviewed:     PROGRESS NOTE:   Authored by Dr. Beata Wahl MD (PGY-1). Pager [facility] [phone] / KATHIA ( [phone]) or via TEAMS    Patient is a 89y old  Male who presents with a chief complaint of COVID19, Chest pain (19 Jan 2024 15:20)      SUBJECTIVE / OVERNIGHT EVENTS:  No acute events overnight. On high flow this AM, will attempt to wean, now at 60% 50L.     MEDICATIONS  (STANDING):  acetylcysteine 20%  Inhalation 4 milliLiter(s) Inhalation every 6 hours  albuterol/ipratropium for Nebulization 3 milliLiter(s) Nebulizer every 6 hours  amLODIPine   Tablet 5 milliGRAM(s) Oral daily  aspirin  chewable 81 milliGRAM(s) Oral daily  azithromycin  IVPB 500 milliGRAM(s) IV Intermittent every 24 hours  cefepime   IVPB 1000 milliGRAM(s) IV Intermittent every 12 hours  cefepime   IVPB      chlorhexidine 2% Cloths 1 Application(s) Topical daily  escitalopram 10 milliGRAM(s) Oral daily  furosemide   Injectable 10 milliGRAM(s) IV Push daily  heparin   Injectable 5000 Unit(s) SubCutaneous every 8 hours  insulin glargine Injectable (LANTUS) 14 Unit(s) SubCutaneous at bedtime  insulin lispro (ADMELOG) corrective regimen sliding scale   SubCutaneous every 6 hours  levETIRAcetam  IVPB 250 milliGRAM(s) IV Intermittent every 12 hours  metoprolol tartrate 25 milliGRAM(s) Oral two times a day  mometasone 110 MICROgram(s) Inhaler 2 Puff(s) Inhalation every 12 hours  mupirocin 2% Ointment 1 Application(s) Topical two times a day  pantoprazole  Injectable 40 milliGRAM(s) IV Push every 12 hours  ranolazine 500 milliGRAM(s) Oral two times a day  sodium chloride 3%  Inhalation 4 milliLiter(s) Inhalation every 6 hours  sucralfate 1 Gram(s) Oral every 12 hours  ticagrelor 60 milliGRAM(s) Oral every 12 hours    MEDICATIONS  (PRN):  acetaminophen     Tablet .. 650 milliGRAM(s) Oral every 6 hours PRN Temp greater or equal to 38C (100.4F), Mild Pain (1 - 3), Moderate Pain (4 - 6)  guaiFENesin Oral Liquid (Sugar-Free) 100 milliGRAM(s) Oral every 6 hours PRN Cough      CAPILLARY BLOOD GLUCOSE      POCT Blood Glucose.: 136 mg/dL (20 Jan 2024 05:22)  POCT Blood Glucose.: 148 mg/dL (20 Jan 2024 00:50)  POCT Blood Glucose.: 129 mg/dL (19 Jan 2024 18:55)  POCT Blood Glucose.: 130 mg/dL (19 Jan 2024 14:47)  POCT Blood Glucose.: 103 mg/dL (19 Jan 2024 13:25)  POCT Blood Glucose.: 105 mg/dL (19 Jan 2024 11:55)  POCT Blood Glucose.: 138 mg/dL (19 Jan 2024 10:00)  POCT Blood Glucose.: 69 mg/dL (19 Jan 2024 08:59)  POCT Blood Glucose.: 66 mg/dL (19 Jan 2024 08:54)  POCT Blood Glucose.: 67 mg/dL (19 Jan 2024 08:53)    I&O's Summary    19 Jan 2024 07:01  -  20 Jan 2024 07:00  --------------------------------------------------------  IN: 0 mL / OUT: 500 mL / NET: -500 mL        PHYSICAL EXAM:  Vital Signs Last 24 Hrs  T(C): 37.4 (20 Jan 2024 04:00), Max: 38.1 (19 Jan 2024 16:02)  T(F): 99.4 (20 Jan 2024 04:00), Max: 100.5 (19 Jan 2024 16:02)  HR: 99 (20 Jan 2024 06:00) (71 - 107)  BP: 120/72 (20 Jan 2024 06:00) (109/44 - 150/131)  BP(mean): 82 (20 Jan 2024 06:00) (63 - 139)  RR: 26 (20 Jan 2024 06:00) (11 - 27)  SpO2: 92% (20 Jan 2024 06:00) (86% - 100%)    Parameters below as of 20 Jan 2024 04:00  Patient On (Oxygen Delivery Method): nasal cannula, high flow  O2 Flow (L/min): 50  O2 Concentration (%): 100    CONSTITUTIONAL: NAD, well-developed however coughing with productive sputum   RESPIRATORY:: rhonchorous, breath sounds   CARDIOVASCULAR: Regular rate and rhythm, normal S1 and S2, no murmur/rub/gallop; No lower extremity edema; Peripheral pulses are 2+ bilaterally  ABDOMEN: Nontender to palpation, normoactive bowel sounds, no rebound/guarding; No hepatosplenomegaly  MSK: no clubbing or cyanosis of digits; no joint swelling or tenderness to palpation  PSYCH: A+O to person, as per son at bedside, minimally responsive as on high flow this AM.     LABS:                        8.7    16.20 )-----------( 271      ( 20 Jan 2024 01:00 )             27.3     01-20    138  |  100  |  16  ----------------------------<  159<H>  4.1   |  26  |  1.63<H>    Ca    8.1<L>      20 Jan 2024 01:00  Phos  3.4     01-20  Mg     1.60     01-20    TPro  6.3  /  Alb  2.4<L>  /  TBili  0.5  /  DBili  x   /  AST  17  /  ALT  14  /  AlkPhos  50  01-20    PT/INR - ( 20 Jan 2024 01:00 )   PT: 13.0 sec;   INR: 1.16 ratio         PTT - ( 20 Jan 2024 01:00 )  PTT:32.2 sec      Urinalysis Basic - ( 20 Jan 2024 01:00 )    Color: x / Appearance: x / SG: x / pH: x  Gluc: 159 mg/dL / Ketone: x  / Bili: x / Urobili: x   Blood: x / Protein: x / Nitrite: x   Leuk Esterase: x / RBC: x / WBC x   Sq Epi: x / Non Sq Epi: x / Bacteria: x        Culture - Bronchial (collected 19 Jan 2024 11:35)  Source: .Bronchial R MIDDLE LOBE  Gram Stain (19 Jan 2024 23:37):    Moderate polymorphonuclear leukocytes per low power field    No squamous epithelial cells per low power field    Numerous Gram Negative Rods per oil power field    Few Gram Positive Cocci in Clusters per oil power field        Tele Reviewed:    RADIOLOGY & ADDITIONAL TESTS:  Results Reviewed:   Imaging Personally Reviewed:  Electrocardiogram Personally Reviewed:

## 2024-01-20 NOTE — PROGRESS NOTE ADULT - ASSESSMENT
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) who presents to the hospital for COVID19 infection and chest pain  MABLE ----improving   Hypophosphatemia - resolved   Hypertensive urgency -resolved --- BP meds as ordered  Ct scan aspiration , new pleural effusion    resp failure - on nc     1 Renal - renal fxn stable, on lasix     -give magnesium IV 1 gm x1 dose  4 CV - BP controlled   5 Vasc - s/p SMA stent placement;   6 Sx - s/p HIDA + for acute asa, medical management, on puree diet , encourage free water in take too (if allowed  with aspiration precautions)   7 GI - s/p EGD clipping of bleeding duodenal ulcers   8 Anemia- hgb stable   9 Pul-chest PT , sp bronch , b/l moderated effusion with atelectasis and ground glass opacities         Maggy Laughlin NP  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) who presents to the hospital for COVID19 infection and chest pain  MABLE ----improving   Hypophosphatemia - resolved   Hypertensive urgency -resolved --- BP meds as ordered   resp failure - on HFNC    1 Renal - renal fxn stable, on lasix     -give magnesium IV 1 gm x1 dose  4 CV - BP controlled   5 Vasc - s/p SMA stent placement;   6 Sx - s/p HIDA + for acute asa, medical management, on puree diet , encourage free water in take too (if allowed  with aspiration precautions)   7 GI - s/p EGD clipping of bleeding duodenal ulcers   8 Anemia- hgb stable   9 Pul-chest PT , sp bronch , b/l moderated effusion with atelectasis and ground glass opacities         Maggy Laughlin NP  [facility]  Office: [phone]

## 2024-01-20 NOTE — PROGRESS NOTE ADULT - ASSESSMENT
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 EKG non ischemic , trop -sera   - echo with normal lv and moderate AS   - c/w metoprolol   - chest pains  Trop mildly elevated and trending down likley sec to kidney disease,       3) Covid +  - s/p remdesivir   - c/w supportive management   - t/t per primary team     4) Abd distension   -CTA AP obtained which showed  partially occlusive calcified and non-calcified plaque just distal to take-off of the SMA, with estimated at least 75% luminal narrowing. Limited evaluation of its distal branches. 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   -on Protonix IV q 12

## 2024-01-20 NOTE — PROGRESS NOTE ADULT - ASSESSMENT
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 Patient states that he has been having a dry cough for the past week, worsened over the past few days. Denies SOB with the cough, denies hemoptysis. Reports fevers at home to 101.5 with associated diaphoresis. Said that he has significant pinprick like b/l chest pain with his cough but denies any chest pain when not coughing or with ambulation. Also reports rhinorrhea and sore throat. Reports generalized weakness and poor appetite. States his daughter was visiting him over the week end last week and she was positive for COVID19. Patient tested positive for COVID19 2 days prior and was started on paxlovid as outpatient. Of note, family states that the patient has had worsening confusion at home over the past 6 months (becoming disoriented to time) but recently has been more confused, talking about seeing people that are not present.   On arrival to the ED his vitals were T 98 -> 99.2, P 80, /72, RR 18, ) sat 100% RA. His lab work showed leukocytosis with neutrophilia and bandemia, MABLE, mild hyponatremia, indeterminant but stable troponins, and elevated lactate to 2.3. His COVID19 swab was positive. His CXR showed bibasilar crackles.  (23 Dec 2023 22:51):  pt has been here for past two weeks and now pulm called fro excessive secretions   he is able to answer simple questions:  grand sons at bedside:     Covid / Resp failure/on 2 L of oxygen  :  CADS/P CABG  DM  CKD  HTN  CVA X 3    Covid / Resp failure/on 2 L of oxygen:  -he was treated for covid before:   -he has excessive secretions  -keep o2 sao2 above 90%  -add BD and mucomyst: ;  -add mucinex:   1/10: he seems to be doing  ok: cont mucomyst and bd   1/11 ?: add benzonatate and Robitussin prm : dc dornase  1/12: seems OK: no sob:  no cough : no phlegm : has gurgling sound in throat:  looks comfortable  1/14: he is on room air:  with good sao2:  finished covid rx:  cont current rx:  high risk for aspiration as he has gurgling secretions in throat   1/15: would do ct chest he seems to have increasing effusions:  his ct scan from last week of december:  has not much of effusions: however will try lasix : [facility]    1/16: doing  ok : no os:b has secretions still : change to percussion bed:  cont bd  1/17: seems stable:  no sob: on 3 L of oxygen : should add ATC lasix to me as he has formed new pleural effusions:  echo with mod AS   1/18; dw pts son : who is a neurologist:  she has secretions in chest with atelectasis and yovani effusions with increased secretions:  the son requests bronchoscopy:  I have explained the advantages and disadvantages of the bronch at this ago and he might not able able to be extubated soon after procedure:  but they want to go ahead with it: IP called   He will remain art risk for recurrent aspiration and atelectasis even after the procedure and they understand that    1/19: FOR BRONCH TODAY  : AGAIN CONFIRMED WITH NEUROLOGISTS SON  1/20 : events noted:  was successful extubated after the procedure:  but then he desaturated with increasing secretions and ended up on high flow and adm to MICU : on recent chest xray has mod large effusion on rigth side:  I think it needs a tap:  would defer to [facility]   ; [facility] cultures are still pendig  CADS/P CABG  -cont current medications   DM  monitor and control   CKD  -cont current meds   HTN   -controlled  CVA X 3  -doing ok :     dw family  and team  GI Bleed:   -secondary to Dieulafoy's lesion:  defer to primary team :    dvt prophylaxis    dw MICU team

## 2024-01-21 LAB
-  AMPICILLIN/SULBACTAM: SIGNIFICANT CHANGE UP
-  CEFAZOLIN: SIGNIFICANT CHANGE UP
-  CLINDAMYCIN: SIGNIFICANT CHANGE UP
-  ERYTHROMYCIN: SIGNIFICANT CHANGE UP
-  GENTAMICIN: SIGNIFICANT CHANGE UP
-  OXACILLIN: SIGNIFICANT CHANGE UP
-  RIFAMPIN: SIGNIFICANT CHANGE UP
-  TETRACYCLINE: SIGNIFICANT CHANGE UP
-  TRIMETHOPRIM/SULFAMETHOXAZOLE: SIGNIFICANT CHANGE UP
-  VANCOMYCIN: SIGNIFICANT CHANGE UP
ALBUMIN SERPL ELPH-MCNC: 2.6 G/DL — LOW (ref 3.3–5)
ALP SERPL-CCNC: 67 U/L — SIGNIFICANT CHANGE UP (ref 40–120)
ALT FLD-CCNC: 17 U/L — SIGNIFICANT CHANGE UP (ref 4–41)
ANION GAP SERPL CALC-SCNC: 13 MMOL/L — SIGNIFICANT CHANGE UP (ref 7–14)
ANION GAP SERPL CALC-SCNC: 15 MMOL/L — HIGH (ref 7–14)
APTT BLD: 38.6 SEC — HIGH (ref 24.5–35.6)
AST SERPL-CCNC: 21 U/L — SIGNIFICANT CHANGE UP (ref 4–40)
BASE EXCESS BLDV CALC-SCNC: 1.6 MMOL/L — SIGNIFICANT CHANGE UP (ref -2–3)
BILIRUB SERPL-MCNC: 0.8 MG/DL — SIGNIFICANT CHANGE UP (ref 0.2–1.2)
BLOOD GAS VENOUS COMPREHENSIVE RESULT: SIGNIFICANT CHANGE UP
BUN SERPL-MCNC: 20 MG/DL — SIGNIFICANT CHANGE UP (ref 7–23)
BUN SERPL-MCNC: 23 MG/DL — SIGNIFICANT CHANGE UP (ref 7–23)
CALCIUM SERPL-MCNC: 8.3 MG/DL — LOW (ref 8.4–10.5)
CALCIUM SERPL-MCNC: 8.3 MG/DL — LOW (ref 8.4–10.5)
CHLORIDE BLDV-SCNC: 101 MMOL/L — SIGNIFICANT CHANGE UP (ref 96–108)
CHLORIDE SERPL-SCNC: 100 MMOL/L — SIGNIFICANT CHANGE UP (ref 98–107)
CHLORIDE SERPL-SCNC: 99 MMOL/L — SIGNIFICANT CHANGE UP (ref 98–107)
CO2 BLDV-SCNC: 29.1 MMOL/L — HIGH (ref 22–26)
CO2 SERPL-SCNC: 24 MMOL/L — SIGNIFICANT CHANGE UP (ref 22–31)
CO2 SERPL-SCNC: 25 MMOL/L — SIGNIFICANT CHANGE UP (ref 22–31)
CREAT SERPL-MCNC: 1.72 MG/DL — HIGH (ref 0.5–1.3)
CREAT SERPL-MCNC: 1.76 MG/DL — HIGH (ref 0.5–1.3)
CULTURE RESULTS: ABNORMAL
EGFR: 36 ML/MIN/1.73M2 — LOW
EGFR: 37 ML/MIN/1.73M2 — LOW
GAS PNL BLDA: SIGNIFICANT CHANGE UP
GAS PNL BLDV: 137 MMOL/L — SIGNIFICANT CHANGE UP (ref 136–145)
GAS PNL BLDV: SIGNIFICANT CHANGE UP
GLUCOSE BLDC GLUCOMTR-MCNC: 178 MG/DL — HIGH (ref 70–99)
GLUCOSE BLDC GLUCOMTR-MCNC: 184 MG/DL — HIGH (ref 70–99)
GLUCOSE BLDC GLUCOMTR-MCNC: 209 MG/DL — HIGH (ref 70–99)
GLUCOSE BLDC GLUCOMTR-MCNC: 228 MG/DL — HIGH (ref 70–99)
GLUCOSE BLDV-MCNC: 215 MG/DL — HIGH (ref 70–99)
GLUCOSE SERPL-MCNC: 169 MG/DL — HIGH (ref 70–99)
GLUCOSE SERPL-MCNC: 223 MG/DL — HIGH (ref 70–99)
HCO3 BLDV-SCNC: 28 MMOL/L — SIGNIFICANT CHANGE UP (ref 22–29)
HCT VFR BLD CALC: 26.1 % — LOW (ref 39–50)
HCT VFR BLDA CALC: 25 % — LOW (ref 39–51)
HGB BLD CALC-MCNC: 8.4 G/DL — LOW (ref 12.6–17.4)
HGB BLD-MCNC: 8.4 G/DL — LOW (ref 13–17)
INR BLD: 1.29 RATIO — HIGH (ref 0.85–1.18)
LACTATE BLDV-MCNC: 1.8 MMOL/L — SIGNIFICANT CHANGE UP (ref 0.5–2)
MAGNESIUM SERPL-MCNC: 2 MG/DL — SIGNIFICANT CHANGE UP (ref 1.6–2.6)
MAGNESIUM SERPL-MCNC: 2 MG/DL — SIGNIFICANT CHANGE UP (ref 1.6–2.6)
MCHC RBC-ENTMCNC: 30.2 PG — SIGNIFICANT CHANGE UP (ref 27–34)
MCHC RBC-ENTMCNC: 32.2 GM/DL — SIGNIFICANT CHANGE UP (ref 32–36)
MCV RBC AUTO: 93.9 FL — SIGNIFICANT CHANGE UP (ref 80–100)
METHOD TYPE: SIGNIFICANT CHANGE UP
NRBC # BLD: 0 /100 WBCS — SIGNIFICANT CHANGE UP (ref 0–0)
NRBC # FLD: 0 K/UL — SIGNIFICANT CHANGE UP (ref 0–0)
NT-PROBNP SERPL-SCNC: HIGH PG/ML
ORGANISM # SPEC MICROSCOPIC CNT: ABNORMAL
ORGANISM # SPEC MICROSCOPIC CNT: ABNORMAL
PCO2 BLDV: 50 MMHG — SIGNIFICANT CHANGE UP (ref 42–55)
PH BLDV: 7.35 — SIGNIFICANT CHANGE UP (ref 7.32–7.43)
PHOSPHATE SERPL-MCNC: 2.9 MG/DL — SIGNIFICANT CHANGE UP (ref 2.5–4.5)
PHOSPHATE SERPL-MCNC: 3.8 MG/DL — SIGNIFICANT CHANGE UP (ref 2.5–4.5)
PLATELET # BLD AUTO: 274 K/UL — SIGNIFICANT CHANGE UP (ref 150–400)
PO2 BLDV: 41 MMHG — SIGNIFICANT CHANGE UP (ref 25–45)
POTASSIUM BLDV-SCNC: 4.4 MMOL/L — SIGNIFICANT CHANGE UP (ref 3.5–5.1)
POTASSIUM SERPL-MCNC: 3.2 MMOL/L — LOW (ref 3.5–5.3)
POTASSIUM SERPL-MCNC: 4.8 MMOL/L — SIGNIFICANT CHANGE UP (ref 3.5–5.3)
POTASSIUM SERPL-SCNC: 3.2 MMOL/L — LOW (ref 3.5–5.3)
POTASSIUM SERPL-SCNC: 4.8 MMOL/L — SIGNIFICANT CHANGE UP (ref 3.5–5.3)
PROT SERPL-MCNC: 6.7 G/DL — SIGNIFICANT CHANGE UP (ref 6–8.3)
PROTHROM AB SERPL-ACNC: 14.5 SEC — HIGH (ref 9.5–13)
RBC # BLD: 2.78 M/UL — LOW (ref 4.2–5.8)
RBC # FLD: 17.2 % — HIGH (ref 10.3–14.5)
SAO2 % BLDV: 59.4 % — LOW (ref 67–88)
SODIUM SERPL-SCNC: 138 MMOL/L — SIGNIFICANT CHANGE UP (ref 135–145)
SODIUM SERPL-SCNC: 138 MMOL/L — SIGNIFICANT CHANGE UP (ref 135–145)
SPECIMEN SOURCE: SIGNIFICANT CHANGE UP
WBC # BLD: 17.58 K/UL — HIGH (ref 3.8–10.5)
WBC # FLD AUTO: 17.58 K/UL — HIGH (ref 3.8–10.5)

## 2024-01-21 PROCEDURE — 99291 CRITICAL CARE FIRST HOUR: CPT

## 2024-01-21 PROCEDURE — 71045 X-RAY EXAM CHEST 1 VIEW: CPT | Mod: 26

## 2024-01-21 RX ORDER — QUETIAPINE FUMARATE 200 MG/1
25 TABLET, FILM COATED ORAL AT BEDTIME
Refills: 0 | Status: DISCONTINUED | OUTPATIENT
Start: 2024-01-21 | End: 2024-01-21

## 2024-01-21 RX ORDER — DEXMEDETOMIDINE HYDROCHLORIDE IN 0.9% SODIUM CHLORIDE 4 UG/ML
0.1 INJECTION INTRAVENOUS
Qty: 400 | Refills: 0 | Status: DISCONTINUED | OUTPATIENT
Start: 2024-01-21 | End: 2024-01-24

## 2024-01-21 RX ORDER — POTASSIUM CHLORIDE 20 MEQ
10 PACKET (EA) ORAL
Refills: 0 | Status: COMPLETED | OUTPATIENT
Start: 2024-01-21 | End: 2024-01-21

## 2024-01-21 RX ORDER — DEXMEDETOMIDINE HYDROCHLORIDE IN 0.9% SODIUM CHLORIDE 4 UG/ML
0.1 INJECTION INTRAVENOUS
Qty: 400 | Refills: 0 | Status: DISCONTINUED | OUTPATIENT
Start: 2024-01-21 | End: 2024-01-21

## 2024-01-21 RX ORDER — FUROSEMIDE 40 MG
40 TABLET ORAL ONCE
Refills: 0 | Status: COMPLETED | OUTPATIENT
Start: 2024-01-21 | End: 2024-01-21

## 2024-01-21 RX ORDER — LANOLIN ALCOHOL/MO/W.PET/CERES
3 CREAM (GRAM) TOPICAL ONCE
Refills: 0 | Status: COMPLETED | OUTPATIENT
Start: 2024-01-21 | End: 2024-01-21

## 2024-01-21 RX ORDER — QUETIAPINE FUMARATE 200 MG/1
12.5 TABLET, FILM COATED ORAL AT BEDTIME
Refills: 0 | Status: DISCONTINUED | OUTPATIENT
Start: 2024-01-21 | End: 2024-01-23

## 2024-01-21 RX ADMIN — HEPARIN SODIUM 5000 UNIT(S): 5000 INJECTION INTRAVENOUS; SUBCUTANEOUS at 14:18

## 2024-01-21 RX ADMIN — LIDOCAINE 1 PATCH: 4 CREAM TOPICAL at 18:50

## 2024-01-21 RX ADMIN — HEPARIN SODIUM 5000 UNIT(S): 5000 INJECTION INTRAVENOUS; SUBCUTANEOUS at 05:48

## 2024-01-21 RX ADMIN — Medication 3 MILLILITER(S): at 15:41

## 2024-01-21 RX ADMIN — Medication 10 MILLIGRAM(S): at 05:56

## 2024-01-21 RX ADMIN — SODIUM CHLORIDE 4 MILLILITER(S): 9 INJECTION INTRAMUSCULAR; INTRAVENOUS; SUBCUTANEOUS at 15:42

## 2024-01-21 RX ADMIN — DEXMEDETOMIDINE HYDROCHLORIDE IN 0.9% SODIUM CHLORIDE 1.98 MICROGRAM(S)/KG/HR: 4 INJECTION INTRAVENOUS at 14:46

## 2024-01-21 RX ADMIN — Medication 3 MILLILITER(S): at 21:43

## 2024-01-21 RX ADMIN — HEPARIN SODIUM 5000 UNIT(S): 5000 INJECTION INTRAVENOUS; SUBCUTANEOUS at 22:33

## 2024-01-21 RX ADMIN — LIDOCAINE 1 PATCH: 4 CREAM TOPICAL at 18:59

## 2024-01-21 RX ADMIN — PIPERACILLIN AND TAZOBACTAM 25 GRAM(S): 4; .5 INJECTION, POWDER, LYOPHILIZED, FOR SOLUTION INTRAVENOUS at 22:33

## 2024-01-21 RX ADMIN — LEVETIRACETAM 400 MILLIGRAM(S): 250 TABLET, FILM COATED ORAL at 06:32

## 2024-01-21 RX ADMIN — PIPERACILLIN AND TAZOBACTAM 25 GRAM(S): 4; .5 INJECTION, POWDER, LYOPHILIZED, FOR SOLUTION INTRAVENOUS at 14:18

## 2024-01-21 RX ADMIN — CHLORHEXIDINE GLUCONATE 1 APPLICATION(S): 213 SOLUTION TOPICAL at 15:00

## 2024-01-21 RX ADMIN — SODIUM CHLORIDE 4 MILLILITER(S): 9 INJECTION INTRAMUSCULAR; INTRAVENOUS; SUBCUTANEOUS at 04:08

## 2024-01-21 RX ADMIN — Medication 3 MILLILITER(S): at 04:08

## 2024-01-21 RX ADMIN — Medication 3 MILLILITER(S): at 09:42

## 2024-01-21 RX ADMIN — MUPIROCIN 1 APPLICATION(S): 20 OINTMENT TOPICAL at 05:56

## 2024-01-21 RX ADMIN — TICAGRELOR 60 MILLIGRAM(S): 90 TABLET ORAL at 05:47

## 2024-01-21 RX ADMIN — SODIUM CHLORIDE 4 MILLILITER(S): 9 INJECTION INTRAMUSCULAR; INTRAVENOUS; SUBCUTANEOUS at 09:41

## 2024-01-21 RX ADMIN — DEXMEDETOMIDINE HYDROCHLORIDE IN 0.9% SODIUM CHLORIDE 1.98 MICROGRAM(S)/KG/HR: 4 INJECTION INTRAVENOUS at 19:54

## 2024-01-21 RX ADMIN — MUPIROCIN 1 APPLICATION(S): 20 OINTMENT TOPICAL at 18:39

## 2024-01-21 RX ADMIN — Medication 40 MILLIGRAM(S): at 20:07

## 2024-01-21 RX ADMIN — Medication 100 MILLIEQUIVALENT(S): at 07:48

## 2024-01-21 RX ADMIN — LIDOCAINE 1 PATCH: 4 CREAM TOPICAL at 07:53

## 2024-01-21 RX ADMIN — PANTOPRAZOLE SODIUM 40 MILLIGRAM(S): 20 TABLET, DELAYED RELEASE ORAL at 05:47

## 2024-01-21 RX ADMIN — SODIUM CHLORIDE 4 MILLILITER(S): 9 INJECTION INTRAMUSCULAR; INTRAVENOUS; SUBCUTANEOUS at 21:54

## 2024-01-21 RX ADMIN — PANTOPRAZOLE SODIUM 40 MILLIGRAM(S): 20 TABLET, DELAYED RELEASE ORAL at 17:34

## 2024-01-21 RX ADMIN — INSULIN GLARGINE 7 UNIT(S): 100 INJECTION, SOLUTION SUBCUTANEOUS at 22:58

## 2024-01-21 RX ADMIN — LEVETIRACETAM 400 MILLIGRAM(S): 250 TABLET, FILM COATED ORAL at 17:36

## 2024-01-21 RX ADMIN — RANOLAZINE 500 MILLIGRAM(S): 500 TABLET, FILM COATED, EXTENDED RELEASE ORAL at 05:48

## 2024-01-21 RX ADMIN — Medication 3 MILLIGRAM(S): at 03:57

## 2024-01-21 RX ADMIN — Medication 100 MILLIEQUIVALENT(S): at 08:48

## 2024-01-21 RX ADMIN — Medication 1 GRAM(S): at 05:47

## 2024-01-21 RX ADMIN — PIPERACILLIN AND TAZOBACTAM 25 GRAM(S): 4; .5 INJECTION, POWDER, LYOPHILIZED, FOR SOLUTION INTRAVENOUS at 05:47

## 2024-01-21 RX ADMIN — Medication 100 MILLIEQUIVALENT(S): at 05:44

## 2024-01-21 RX ADMIN — Medication 25 MILLIGRAM(S): at 05:48

## 2024-01-21 RX ADMIN — LIDOCAINE 1 PATCH: 4 CREAM TOPICAL at 07:00

## 2024-01-21 RX ADMIN — AMLODIPINE BESYLATE 5 MILLIGRAM(S): 2.5 TABLET ORAL at 05:47

## 2024-01-21 NOTE — PROGRESS NOTE ADULT - ASSESSMENT
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 Patient states that he has been having a dry cough for the past week, worsened over the past few days. Denies SOB with the cough, denies hemoptysis. Reports fevers at home to 101.5 with associated diaphoresis. Said that he has significant pinprick like b/l chest pain with his cough but denies any chest pain when not coughing or with ambulation. Also reports rhinorrhea and sore throat. Reports generalized weakness and poor appetite. States his daughter was visiting him over the week end last week and she was positive for COVID19. Patient tested positive for COVID19 2 days prior and was started on paxlovid as outpatient. Of note, family states that the patient has had worsening confusion at home over the past 6 months (becoming disoriented to time) but recently has been more confused, talking about seeing people that are not present.   On arrival to the ED his vitals were T 98 -> 99.2, P 80, /72, RR 18, ) sat 100% RA. His lab work showed leukocytosis with neutrophilia and bandemia, MABLE, mild hyponatremia, indeterminant but stable troponins, and elevated lactate to 2.3. His COVID19 swab was positive. His CXR showed bibasilar crackles.  (23 Dec 2023 22:51):  pt has been here for past two weeks and now pulm called fro excessive secretions   he is able to answer simple questions:  grand sons at bedside:     Covid / Resp failure/on 2 L of oxygen  :  CADS/P CABG  DM  CKD  HTN  CVA X 3    Covid / Resp failure/on 2 L of oxygen:  -he was treated for covid before:   -he has excessive secretions  -keep o2 sao2 above 90%  -add BD and mucomyst: ;  -add mucinex:   1/10: he seems to be doing  ok: cont mucomyst and bd   1/11 ?: add benzonatate and Robitussin prm : dc dornase  1/12: seems OK: no sob:  no cough : no phlegm : has gurgling sound in throat:  looks comfortable  1/14: he is on room air:  with good sao2:  finished covid rx:  cont current rx:  high risk for aspiration as he has gurgling secretions in throat   1/15: would do ct chest he seems to have increasing effusions:  his ct scan from last week of december:  has not much of effusions: however will try lasix : [facility]    1/16: doing  ok : no os:b has secretions still : change to percussion bed:  cont bd  1/17: seems stable:  no sob: on 3 L of oxygen : should add ATC lasix to me as he has formed new pleural effusions:  echo with mod AS   1/18; [facility] pts son : who is a neurologist:  she has secretions in chest with atelectasis and yovani effusions with increased secretions:  the son requests bronchoscopy:  I have explained the advantages and disadvantages of the bronch at this ago and he might not able able to be extubated soon after procedure:  but they want to go ahead with it: IP called   He will remain art risk for recurrent aspiration and atelectasis even after the procedure and they understand that    1/19: FOR BRONCH TODAY  : AGAIN CONFIRMED WITH NEUROLOGISTS SON  1/20 : events noted:  was successful extubated after the procedure:  but then he desaturated with increasing secretions and ended up on high flow and adm to MICU : on recent chest xray has mod large effusion on rigth side:  I think it needs a tap:  would defer to [facility]   ; [facility] cultures are still pending  1/21: [facility]:  consider tapping on rigth saide:  his PCP and son at bedside:  he is not obviously sob but still on 100%  high flow   CADS/P CABG  -cont current medications   DM  monitor and control   CKD  -cont current meds   HTN   -controlled  CVA X 3  -doing ok :     [facility] family  and team  GI Bleed:   -secondary to Dieulafoy's lesion:  defer to primary team :    dvt prophylaxis    dw MICU team

## 2024-01-21 NOTE — PROGRESS NOTE ADULT - SUBJECTIVE AND OBJECTIVE BOX
INTERVAL HPI/OVERNIGHT EVENTS:    SUBJECTIVE: Patient seen and examined at bedside. Intubated, sedated, ROS cannot be obtained.       VITAL SIGNS:  ICU Vital Signs Last 24 Hrs  T(C): 37.2 (21 Jan 2024 04:00), Max: 38.2 (20 Jan 2024 12:00)  T(F): 99 (21 Jan 2024 04:00), Max: 100.8 (20 Jan 2024 12:00)  HR: 93 (21 Jan 2024 07:00) (81 - 98)  BP: 145/60 (21 Jan 2024 07:00) (98/66 - 156/71)  BP(mean): 83 (21 Jan 2024 07:00) (70 - 94)  ABP: --  ABP(mean): --  RR: 19 (21 Jan 2024 07:00) (15 - 31)  SpO2: 95% (21 Jan 2024 07:00) (90% - 100%)    O2 Parameters below as of 21 Jan 2024 07:00  Patient On (Oxygen Delivery Method): nasal cannula, high flow  O2 Flow (L/min): 60  O2 Concentration (%): 80        Plateau pressure:   P/F ratio:     01-20 @ 07:01  -  01-21 @ 07:00  --------------------------------------------------------  IN: 700 mL / OUT: 880 mL / NET: -180 mL      CAPILLARY BLOOD GLUCOSE      POCT Blood Glucose.: 178 mg/dL (21 Jan 2024 06:31)    ECG:    PHYSICAL EXAM:    General:   HEENT:   Neck:   Respiratory:   Cardiovascular:   Abdomen:   Extremities:  Neurological:    MEDICATIONS:  MEDICATIONS  (STANDING):  albuterol/ipratropium for Nebulization 3 milliLiter(s) Nebulizer every 6 hours  amLODIPine   Tablet 5 milliGRAM(s) Oral daily  aspirin  chewable 81 milliGRAM(s) Oral daily  chlorhexidine 2% Cloths 1 Application(s) Topical daily  escitalopram 10 milliGRAM(s) Oral daily  furosemide   Injectable 10 milliGRAM(s) IV Push daily  heparin   Injectable 5000 Unit(s) SubCutaneous every 8 hours  insulin glargine Injectable (LANTUS) 7 Unit(s) SubCutaneous at bedtime  insulin lispro (ADMELOG) corrective regimen sliding scale   SubCutaneous every 6 hours  levETIRAcetam  IVPB 250 milliGRAM(s) IV Intermittent every 12 hours  lidocaine   4% Patch 1 Patch Transdermal every 24 hours  metoprolol tartrate 25 milliGRAM(s) Oral two times a day  mupirocin 2% Ointment 1 Application(s) Topical two times a day  pantoprazole  Injectable 40 milliGRAM(s) IV Push every 12 hours  piperacillin/tazobactam IVPB.. 3.375 Gram(s) IV Intermittent every 8 hours  potassium chloride  10 mEq/100 mL IVPB 10 milliEquivalent(s) IV Intermittent every 1 hour  ranolazine 500 milliGRAM(s) Oral two times a day  sodium chloride 3%  Inhalation 4 milliLiter(s) Inhalation every 6 hours  sucralfate 1 Gram(s) Oral every 12 hours  ticagrelor 60 milliGRAM(s) Oral every 12 hours    MEDICATIONS  (PRN):  acetaminophen     Tablet .. 650 milliGRAM(s) Oral every 6 hours PRN Temp greater or equal to 38C (100.4F), Mild Pain (1 - 3), Moderate Pain (4 - 6)  guaiFENesin Oral Liquid (Sugar-Free) 100 milliGRAM(s) Oral every 6 hours PRN Cough      ALLERGIES:  Allergies    fluoroquinolone antibiotics (Other)  Cipro (Unknown)  Tegretol (Unknown)  carbamazepine (Other)    Intolerances        LABS:                        8.4    17.58 )-----------( 274      ( 21 Jan 2024 04:05 )             26.1     01-21    138  |  100  |  20  ----------------------------<  169<H>  3.2<L>   |  25  |  1.72<H>    Ca    8.3<L>      21 Jan 2024 04:05  Phos  2.9     01-21  Mg     2.00     01-21    TPro  6.7  /  Alb  2.6<L>  /  TBili  0.8  /  DBili  x   /  AST  21  /  ALT  17  /  AlkPhos  67  01-21    PT/INR - ( 21 Jan 2024 04:05 )   PT: 14.5 sec;   INR: 1.29 ratio         PTT - ( 21 Jan 2024 04:05 )  PTT:38.6 sec  Urinalysis Basic - ( 21 Jan 2024 04:05 )    Color: x / Appearance: x / SG: x / pH: x  Gluc: 169 mg/dL / Ketone: x  / Bili: x / Urobili: x   Blood: x / Protein: x / Nitrite: x   Leuk Esterase: x / RBC: x / WBC x   Sq Epi: x / Non Sq Epi: x / Bacteria: x        RADIOLOGY & ADDITIONAL TESTS: Reviewed.   INTERVAL HPI/OVERNIGHT EVENTS:    SUBJECTIVE: Patient seen and examined at bedside. Pt on HFNC, breathing comfortably. Endorses no acute complaints.      VITAL SIGNS:  ICU Vital Signs Last 24 Hrs  T(C): 37.2 (21 Jan 2024 04:00), Max: 38.2 (20 Jan 2024 12:00)  T(F): 99 (21 Jan 2024 04:00), Max: 100.8 (20 Jan 2024 12:00)  HR: 93 (21 Jan 2024 07:00) (81 - 98)  BP: 145/60 (21 Jan 2024 07:00) (98/66 - 156/71)  BP(mean): 83 (21 Jan 2024 07:00) (70 - 94)  ABP: --  ABP(mean): --  RR: 19 (21 Jan 2024 07:00) (15 - 31)  SpO2: 95% (21 Jan 2024 07:00) (90% - 100%)    O2 Parameters below as of 21 Jan 2024 07:00  Patient On (Oxygen Delivery Method): nasal cannula, high flow  O2 Flow (L/min): 60  O2 Concentration (%): 80        Plateau pressure:   P/F ratio:     01-20 @ 07:01  -  01-21 @ 07:00  --------------------------------------------------------  IN: 700 mL / OUT: 880 mL / NET: -180 mL      CAPILLARY BLOOD GLUCOSE      POCT Blood Glucose.: 178 mg/dL (21 Jan 2024 06:31)    ECG:    PHYSICAL EXAM:    General: no acute distress  HEENT: atraumatic  Neck: no JVD  Respiratory: coarse breath sounds b/l  Cardiovascular: normal S1 S2 no m/r/g  Abdomen: soft NT ND  Extremities: no edmea  Neurological: responds appropriately; awake    MEDICATIONS:  MEDICATIONS  (STANDING):  albuterol/ipratropium for Nebulization 3 milliLiter(s) Nebulizer every 6 hours  amLODIPine   Tablet 5 milliGRAM(s) Oral daily  aspirin  chewable 81 milliGRAM(s) Oral daily  chlorhexidine 2% Cloths 1 Application(s) Topical daily  escitalopram 10 milliGRAM(s) Oral daily  furosemide   Injectable 10 milliGRAM(s) IV Push daily  heparin   Injectable 5000 Unit(s) SubCutaneous every 8 hours  insulin glargine Injectable (LANTUS) 7 Unit(s) SubCutaneous at bedtime  insulin lispro (ADMELOG) corrective regimen sliding scale   SubCutaneous every 6 hours  levETIRAcetam  IVPB 250 milliGRAM(s) IV Intermittent every 12 hours  lidocaine   4% Patch 1 Patch Transdermal every 24 hours  metoprolol tartrate 25 milliGRAM(s) Oral two times a day  mupirocin 2% Ointment 1 Application(s) Topical two times a day  pantoprazole  Injectable 40 milliGRAM(s) IV Push every 12 hours  piperacillin/tazobactam IVPB.. 3.375 Gram(s) IV Intermittent every 8 hours  potassium chloride  10 mEq/100 mL IVPB 10 milliEquivalent(s) IV Intermittent every 1 hour  ranolazine 500 milliGRAM(s) Oral two times a day  sodium chloride 3%  Inhalation 4 milliLiter(s) Inhalation every 6 hours  sucralfate 1 Gram(s) Oral every 12 hours  ticagrelor 60 milliGRAM(s) Oral every 12 hours    MEDICATIONS  (PRN):  acetaminophen     Tablet .. 650 milliGRAM(s) Oral every 6 hours PRN Temp greater or equal to 38C (100.4F), Mild Pain (1 - 3), Moderate Pain (4 - 6)  guaiFENesin Oral Liquid (Sugar-Free) 100 milliGRAM(s) Oral every 6 hours PRN Cough      ALLERGIES:  Allergies    fluoroquinolone antibiotics (Other)  Cipro (Unknown)  Tegretol (Unknown)  carbamazepine (Other)    Intolerances        LABS:                        8.4    17.58 )-----------( 274      ( 21 Jan 2024 04:05 )             26.1     01-21    138  |  100  |  20  ----------------------------<  169<H>  3.2<L>   |  25  |  1.72<H>    Ca    8.3<L>      21 Jan 2024 04:05  Phos  2.9     01-21  Mg     2.00     01-21    TPro  6.7  /  Alb  2.6<L>  /  TBili  0.8  /  DBili  x   /  AST  21  /  ALT  17  /  AlkPhos  67  01-21    PT/INR - ( 21 Jan 2024 04:05 )   PT: 14.5 sec;   INR: 1.29 ratio         PTT - ( 21 Jan 2024 04:05 )  PTT:38.6 sec  Urinalysis Basic - ( 21 Jan 2024 04:05 )    Color: x / Appearance: x / SG: x / pH: x  Gluc: 169 mg/dL / Ketone: x  / Bili: x / Urobili: x   Blood: x / Protein: x / Nitrite: x   Leuk Esterase: x / RBC: x / WBC x   Sq Epi: x / Non Sq Epi: x / Bacteria: x        RADIOLOGY & ADDITIONAL TESTS: Reviewed.

## 2024-01-21 NOTE — PROGRESS NOTE ADULT - SUBJECTIVE AND OBJECTIVE BOX
Nephrology Progress Note    Patient is a 89y male breathing stable on HFNC.    Allergies:  fluoroquinolone antibiotics (Other)  Cipro (Unknown)  Tegretol (Unknown)  carbamazepine (Other)    Hospital Medications:   MEDICATIONS  (STANDING):  albuterol/ipratropium for Nebulization 3 milliLiter(s) Nebulizer every 6 hours  amLODIPine   Tablet 5 milliGRAM(s) Oral daily  aspirin  chewable 81 milliGRAM(s) Oral daily  chlorhexidine 2% Cloths 1 Application(s) Topical daily  dexMEDEtomidine Infusion 0.1 MICROgram(s)/kG/Hr (1.98 mL/Hr) IV Continuous <Continuous>  escitalopram 10 milliGRAM(s) Oral daily  furosemide   Injectable 10 milliGRAM(s) IV Push daily  heparin   Injectable 5000 Unit(s) SubCutaneous every 8 hours  insulin glargine Injectable (LANTUS) 7 Unit(s) SubCutaneous at bedtime  insulin lispro (ADMELOG) corrective regimen sliding scale   SubCutaneous every 6 hours  levETIRAcetam  IVPB 250 milliGRAM(s) IV Intermittent every 12 hours  lidocaine   4% Patch 1 Patch Transdermal every 24 hours  metoprolol tartrate 25 milliGRAM(s) Oral two times a day  mupirocin 2% Ointment 1 Application(s) Topical two times a day  pantoprazole  Injectable 40 milliGRAM(s) IV Push every 12 hours  piperacillin/tazobactam IVPB.. 3.375 Gram(s) IV Intermittent every 8 hours  QUEtiapine 12.5 milliGRAM(s) Oral at bedtime  ranolazine 500 milliGRAM(s) Oral two times a day  sodium chloride 3%  Inhalation 4 milliLiter(s) Inhalation every 6 hours  sucralfate 1 Gram(s) Oral every 12 hours  ticagrelor 60 milliGRAM(s) Oral every 12 hours        VITALS:  T(F): 98 (01-21-24 @ 12:00), Max: 100.7 (01-20-24 @ 16:00)  HR: 90 (01-21-24 @ 14:40)  BP: 127/51 (01-21-24 @ 14:00)  RR: 23 (01-21-24 @ 14:40)  SpO2: 94% (01-21-24 @ 14:40)      01-19 @ 07:01  -  01-20 @ 07:00  --------------------------------------------------------  IN: 50 mL / OUT: 530 mL / NET: -480 mL    01-20 @ 07:01 - 01-21 @ 07:00  --------------------------------------------------------  IN: 700 mL / OUT: 880 mL / NET: -180 mL    01-21 @ 07:01 - 01-21 @ 15:31  --------------------------------------------------------  IN: 313.9 mL / OUT: 335 mL / NET: -21.1 mL        PHYSICAL EXAM:  Constitutional: NAD  Neck: No JVD  Respiratory: decreased on left  Cardiovascular: S1, S2, RRR  Gastrointestinal: BS+, soft, NT/ND  Extremities: No cyanosis or clubbing. No peripheral edema  Neurological: A/O x 3  Psychiatric: Normal mood, normal affect  : + delong.   Skin: No rashes    LABS:  01-21    138  |  100  |  20  ----------------------------<  169<H>  3.2<L>   |  25  |  1.72<H>    Ca    8.3<L>      21 Jan 2024 04:05  Phos  2.9     01-21  Mg     2.00     01-21    TPro  6.7  /  Alb  2.6<L>  /  TBili  0.8  /  DBili      /  AST  21  /  ALT  17  /  AlkPhos  67  01-21                          8.4    17.58 )-----------( 274      ( 21 Jan 2024 04:05 )             26.1           RADIOLOGY & ADDITIONAL STUDIES:  INTERPRETATION:  EXAMINATION: XR CHEST URGENT    CLINICAL INDICATION: Desaturation    TECHNIQUE: Single frontal, portable view ofthe chest was obtained.    COMPARISON: Chest x-ray 1/19/2024.    FINDINGS:  Partial visualization of a left-sided cardiac device leads in right   atrium and right ventricle.  Sternotomy wires.  The heart is not accurately assessed in this AP projection.  Moderate-sized right pleural effusion, slightly worsened compared to   prior.  Trace left pleural effusion.  No acute bony abnormality.    IMPRESSION:  Moderate-sized right pleural effusion, slightly worsened compared to   prior.    --- End of Report ---     Strong peripheral pulses/Capillary refill less/equal to 2 seconds

## 2024-01-21 NOTE — PROGRESS NOTE ADULT - ASSESSMENT
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     PLAN  =====Neurologic=====  #CVA  - prior CVA x3 with no residual deficits  #myoclonic jerks  - on Keppra will continue  - holding home lexapro gabapentin and memantine given persistent secretions/ cough     =====Pulmonary=====  #COVID  - s/p paxlovid and 1 dose remdesivir while inpatient  #Pleural effusions b/l  - CT chest from 1/16 showing new small bilateral pleural effusions with associated complete collapse  of the right middle and right lower lobes and partial atelectasis of the  right upper and left lower lobes. patchy bilateral ground-glass opacities are consistent with pulmonary edema. layering secretions in the trachea, left mainstem bronchus, and right lower lobe bronchus  - currently on cefepime and azithro for empiric PNA coverage will switch to zosyn and dc azithro   - s/p bronch for increasing secretions- on Mucomyst, hypertonic saline, albuterol, guaifenesin, mometasone will continue   - currently on high-flow 60% 50L, will wean,  fu ABGs worsening hypercapnia possible intubation pending GOC    =====Cardiovascular=====  Patient does not currently require vasopressors and is normotensive  #HTN  - on home amlodipine and metoprolol, will continue here when able to tolerate PO    #CAD  - on Brilinta aspirin and ranolazine continue when able to tolerate PO     =====GI=====  #upper GI bleed  - s/p EGD showing dieulafoy lesion and esophagitis likely 2/2 NGT irritation s/p 2 clips  - on protonix IV BID continue     #Diet  - DASH diet if tolerates     =====Renal/=====  #Electrolytes  - Maintain K>4, Phos>3, Mag>2, iCal>1  - baseline cr 1.5, at baseline now      =====Endocrine=====  #DM2  - on lantus 14units and SSI  - a1c 9.3, glucose wnl inpatient     =====Infectious Disease=====  #COVID   - s/p paxlovid and 1 dose remdesivir  # PNA  - CT chest showing ground glass opacities  - treatment with azithro and cefepime currently- switch to zosyn on 1/20 for aspiration coverage, dc azithro   - f/u urine legionella  - no white count or fever thus far  - f/u bronchial cultures   - f/u blood cultures given fever overnight on 1/20    =====Heme/Onc=====  #DVT Ppx  - Lovenox 40mg SQ qd    =====Ethics=====  FULL CODE. 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     PLAN  =====Neurologic=====  #CVA  - prior CVA x3 with no residual deficits  #myoclonic jerks  - on Keppra will continue  - holding home lexapro gabapentin and memantine given persistent secretions/ cough   - can restart once enteric access is established    =====Pulmonary=====  #COVID  - s/p paxlovid and 1 dose remdesivir while inpatient  #Pleural effusions b/l  - CT chest from 1/16 showing new small bilateral pleural effusions with associated complete collapse  of the right middle and right lower lobes and partial atelectasis of the  right upper and left lower lobes. patchy bilateral ground-glass opacities are consistent with pulmonary edema. layering secretions in the trachea, left mainstem bronchus, and right lower lobe bronchus  - currently on cefepime and azithro for empiric PNA coverage will switch to zosyn and dc azithro   - s/p bronch for increasing secretions- on Mucomyst, hypertonic saline, albuterol, guaifenesin, mometasone will continue   - currently on high-flow 60% 50L, will wean,  fu ABGs worsening hypercapnia possible intubation pending GOC    =====Cardiovascular=====  Patient does not currently require vasopressors and is normotensive  #HTN  - on home amlodipine and metoprolol, will continue here when able to tolerate PO    #CAD  - on Brilinta aspirin and ranolazine continue when able to tolerate PO     =====GI=====  #upper GI bleed  - s/p EGD showing dieulafoy lesion and esophagitis likely 2/2 NGT irritation s/p 2 clips  - on protonix IV BID continue     #Diet  - tube feeds once NGT placed    =====Renal/=====  #Electrolytes  - Maintain K>4, Phos>3, Mag>2, iCal>1  - baseline cr 1.5, at baseline now      =====Endocrine=====  #DM2  - on lantus 14units and SSI  - a1c 9.3, glucose wnl inpatient     =====Infectious Disease=====  #COVID   - s/p paxlovid and 1 dose remdesivir  # PNA  - CT chest showing ground glass opacities  - treatment with azithro and cefepime currently- switch to zosyn on 1/20 for aspiration coverage, dc azithro   - f/u urine legionella  - no white count or fever thus far  - f/u bronchial cultures   - f/u blood cultures given fever overnight on 1/20    =====Heme/Onc=====  #DVT Ppx  - Lovenox 40mg SQ qd    =====Ethics=====  FULL CODE.

## 2024-01-21 NOTE — PROGRESS NOTE ADULT - ATTENDING COMMENTS
Patient is a 89 yo M w/ CAD s/p CABG s/p stents (last stent 5/2022), s/p PPM, HTN, HLD, T2DM, CKD, PVD, prior CA x3 (without residual deficits), and Myoclonic Jerks (on Keppra) admitted to MICU for acute respiratory failure, worsening s/p bronchoscopy 1/19.     #Acute hypoxemic respiratory failure  #Staph Aureus Pneumonia  #Dysphagia  - c/w HFNC, wean as tolerated  - Will c/w Duonebs q6h, 3% q12h. Will try IPV again for aggressive airway clearance  - Keep NPO. Recommended for dysphagia diet but wet/gurgly voice still. WIll place KO tube  - c/w Zosyn for now for MSSA and possible aspiration  - f/u cultures  - WIll start low dose seroquel at night if QTC ok  - TOV  - PT tracy Goode MD  Pulmonary & Critical Care

## 2024-01-21 NOTE — PROGRESS NOTE ADULT - ASSESSMENT
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) who presents to the hospital for COVID19 infection and chest pain  MABLE ----improving   Hypophosphatemia - resolved   Hypertensive urgency -resolved --- BP meds as ordered   resp failure - on HFNC    1 Renal - crt trending up, would hold lasx   hypokalemia supplemented  4 CV - BP controlled   5 Vasc - s/p SMA stent placement;   6 Sx - s/p HIDA + for acute asa, medical management, on puree diet , encourage free water in take too (if allowed  with aspiration precautions)   7 GI - s/p EGD clipping of bleeding duodenal ulcers   8 Anemia- hgb stable   9 Pul-chest PT , sp bronch , b/l moderated effusion with atelectasis and ground glass opacities   CXR with increased rt pleural effusion      Maggy Laughlin NP  [facility]  Office: [phone]

## 2024-01-22 LAB
ALBUMIN SERPL ELPH-MCNC: 2.3 G/DL — LOW (ref 3.3–5)
ALBUMIN SERPL ELPH-MCNC: 2.6 G/DL — LOW (ref 3.3–5)
ALP SERPL-CCNC: 78 U/L — SIGNIFICANT CHANGE UP (ref 40–120)
ALP SERPL-CCNC: 88 U/L — SIGNIFICANT CHANGE UP (ref 40–120)
ALT FLD-CCNC: 13 U/L — SIGNIFICANT CHANGE UP (ref 4–41)
ALT FLD-CCNC: 22 U/L — SIGNIFICANT CHANGE UP (ref 4–41)
ANION GAP SERPL CALC-SCNC: 10 MMOL/L — SIGNIFICANT CHANGE UP (ref 7–14)
ANION GAP SERPL CALC-SCNC: 17 MMOL/L — HIGH (ref 7–14)
APTT BLD: 37.4 SEC — HIGH (ref 24.5–35.6)
AST SERPL-CCNC: 25 U/L — SIGNIFICANT CHANGE UP (ref 4–40)
AST SERPL-CCNC: 26 U/L — SIGNIFICANT CHANGE UP (ref 4–40)
BILIRUB SERPL-MCNC: 0.2 MG/DL — SIGNIFICANT CHANGE UP (ref 0.2–1.2)
BILIRUB SERPL-MCNC: 0.7 MG/DL — SIGNIFICANT CHANGE UP (ref 0.2–1.2)
BUN SERPL-MCNC: 24 MG/DL — HIGH (ref 7–23)
BUN SERPL-MCNC: 25 MG/DL — HIGH (ref 7–23)
CALCIUM SERPL-MCNC: 8.5 MG/DL — SIGNIFICANT CHANGE UP (ref 8.4–10.5)
CALCIUM SERPL-MCNC: 8.6 MG/DL — SIGNIFICANT CHANGE UP (ref 8.4–10.5)
CHLORIDE SERPL-SCNC: 112 MMOL/L — HIGH (ref 98–107)
CHLORIDE SERPL-SCNC: 97 MMOL/L — LOW (ref 98–107)
CO2 SERPL-SCNC: 21 MMOL/L — LOW (ref 22–31)
CO2 SERPL-SCNC: 25 MMOL/L — SIGNIFICANT CHANGE UP (ref 22–31)
CREAT SERPL-MCNC: 1.04 MG/DL — SIGNIFICANT CHANGE UP (ref 0.5–1.3)
CREAT SERPL-MCNC: 1.79 MG/DL — HIGH (ref 0.5–1.3)
EGFR: 36 ML/MIN/1.73M2 — LOW
EGFR: 68 ML/MIN/1.73M2 — SIGNIFICANT CHANGE UP
GAS PNL BLDA: SIGNIFICANT CHANGE UP
GAS PNL BLDA: SIGNIFICANT CHANGE UP
GLUCOSE BLDC GLUCOMTR-MCNC: 248 MG/DL — HIGH (ref 70–99)
GLUCOSE BLDC GLUCOMTR-MCNC: 265 MG/DL — HIGH (ref 70–99)
GLUCOSE BLDC GLUCOMTR-MCNC: 295 MG/DL — HIGH (ref 70–99)
GLUCOSE BLDC GLUCOMTR-MCNC: 304 MG/DL — HIGH (ref 70–99)
GLUCOSE SERPL-MCNC: 240 MG/DL — HIGH (ref 70–99)
GLUCOSE SERPL-MCNC: 293 MG/DL — HIGH (ref 70–99)
HCT VFR BLD CALC: 26.6 % — LOW (ref 39–50)
HGB BLD-MCNC: 8.7 G/DL — LOW (ref 13–17)
INR BLD: 1.3 RATIO — HIGH (ref 0.85–1.18)
MAGNESIUM SERPL-MCNC: 1.9 MG/DL — SIGNIFICANT CHANGE UP (ref 1.6–2.6)
MAGNESIUM SERPL-MCNC: 2.2 MG/DL — SIGNIFICANT CHANGE UP (ref 1.6–2.6)
MCHC RBC-ENTMCNC: 30 PG — SIGNIFICANT CHANGE UP (ref 27–34)
MCHC RBC-ENTMCNC: 32.7 GM/DL — SIGNIFICANT CHANGE UP (ref 32–36)
MCV RBC AUTO: 91.7 FL — SIGNIFICANT CHANGE UP (ref 80–100)
NRBC # BLD: 0 /100 WBCS — SIGNIFICANT CHANGE UP (ref 0–0)
NRBC # FLD: 0 K/UL — SIGNIFICANT CHANGE UP (ref 0–0)
PHOSPHATE SERPL-MCNC: 2.1 MG/DL — LOW (ref 2.5–4.5)
PHOSPHATE SERPL-MCNC: 3.7 MG/DL — SIGNIFICANT CHANGE UP (ref 2.5–4.5)
PLATELET # BLD AUTO: 298 K/UL — SIGNIFICANT CHANGE UP (ref 150–400)
POTASSIUM SERPL-MCNC: 3.4 MMOL/L — LOW (ref 3.5–5.3)
POTASSIUM SERPL-MCNC: 4.7 MMOL/L — SIGNIFICANT CHANGE UP (ref 3.5–5.3)
POTASSIUM SERPL-SCNC: 3.4 MMOL/L — LOW (ref 3.5–5.3)
POTASSIUM SERPL-SCNC: 4.7 MMOL/L — SIGNIFICANT CHANGE UP (ref 3.5–5.3)
PROT SERPL-MCNC: 6.5 G/DL — SIGNIFICANT CHANGE UP (ref 6–8.3)
PROT SERPL-MCNC: 7.1 G/DL — SIGNIFICANT CHANGE UP (ref 6–8.3)
PROTHROM AB SERPL-ACNC: 14.5 SEC — HIGH (ref 9.5–13)
RBC # BLD: 2.9 M/UL — LOW (ref 4.2–5.8)
RBC # FLD: 16.6 % — HIGH (ref 10.3–14.5)
SODIUM SERPL-SCNC: 139 MMOL/L — SIGNIFICANT CHANGE UP (ref 135–145)
SODIUM SERPL-SCNC: 143 MMOL/L — SIGNIFICANT CHANGE UP (ref 135–145)
WBC # BLD: 21.18 K/UL — HIGH (ref 3.8–10.5)
WBC # FLD AUTO: 21.18 K/UL — HIGH (ref 3.8–10.5)

## 2024-01-22 PROCEDURE — 93308 TTE F-UP OR LMTD: CPT | Mod: 26,GC

## 2024-01-22 PROCEDURE — 76604 US EXAM CHEST: CPT | Mod: 26,GC

## 2024-01-22 PROCEDURE — 93321 DOPPLER ECHO F-UP/LMTD STD: CPT | Mod: 26,GC

## 2024-01-22 PROCEDURE — 31646 BRNCHSC W/THER ASPIR SBSQ: CPT | Mod: GC

## 2024-01-22 PROCEDURE — 31500 INSERT EMERGENCY AIRWAY: CPT | Mod: GC

## 2024-01-22 PROCEDURE — 99291 CRITICAL CARE FIRST HOUR: CPT | Mod: 25

## 2024-01-22 PROCEDURE — 31624 DX BRONCHOSCOPE/LAVAGE: CPT | Mod: GC

## 2024-01-22 PROCEDURE — 74018 RADEX ABDOMEN 1 VIEW: CPT | Mod: 26

## 2024-01-22 PROCEDURE — 71045 X-RAY EXAM CHEST 1 VIEW: CPT | Mod: 26

## 2024-01-22 RX ORDER — POTASSIUM CHLORIDE 20 MEQ
20 PACKET (EA) ORAL ONCE
Refills: 0 | Status: COMPLETED | OUTPATIENT
Start: 2024-01-22 | End: 2024-01-22

## 2024-01-22 RX ORDER — SENNA PLUS 8.6 MG/1
2 TABLET ORAL AT BEDTIME
Refills: 0 | Status: DISCONTINUED | OUTPATIENT
Start: 2024-01-22 | End: 2024-01-24

## 2024-01-22 RX ORDER — POTASSIUM CHLORIDE 20 MEQ
40 PACKET (EA) ORAL ONCE
Refills: 0 | Status: COMPLETED | OUTPATIENT
Start: 2024-01-22 | End: 2024-01-22

## 2024-01-22 RX ORDER — INSULIN LISPRO 100/ML
VIAL (ML) SUBCUTANEOUS EVERY 6 HOURS
Refills: 0 | Status: DISCONTINUED | OUTPATIENT
Start: 2024-01-22 | End: 2024-01-26

## 2024-01-22 RX ORDER — CHLORHEXIDINE GLUCONATE 213 G/1000ML
15 SOLUTION TOPICAL EVERY 12 HOURS
Refills: 0 | Status: DISCONTINUED | OUTPATIENT
Start: 2024-01-22 | End: 2024-01-28

## 2024-01-22 RX ORDER — INSULIN LISPRO 100/ML
VIAL (ML) SUBCUTANEOUS AT BEDTIME
Refills: 0 | Status: DISCONTINUED | OUTPATIENT
Start: 2024-01-22 | End: 2024-01-22

## 2024-01-22 RX ORDER — INSULIN LISPRO 100/ML
VIAL (ML) SUBCUTANEOUS
Refills: 0 | Status: DISCONTINUED | OUTPATIENT
Start: 2024-01-22 | End: 2024-01-22

## 2024-01-22 RX ORDER — MAGNESIUM SULFATE 500 MG/ML
1 VIAL (ML) INJECTION ONCE
Refills: 0 | Status: COMPLETED | OUTPATIENT
Start: 2024-01-22 | End: 2024-01-22

## 2024-01-22 RX ORDER — PROPOFOL 10 MG/ML
6 INJECTION, EMULSION INTRAVENOUS ONCE
Refills: 0 | Status: COMPLETED | OUTPATIENT
Start: 2024-01-22 | End: 2024-01-22

## 2024-01-22 RX ORDER — NOREPINEPHRINE BITARTRATE/D5W 8 MG/250ML
0.05 PLASTIC BAG, INJECTION (ML) INTRAVENOUS
Qty: 8 | Refills: 0 | Status: DISCONTINUED | OUTPATIENT
Start: 2024-01-22 | End: 2024-01-23

## 2024-01-22 RX ORDER — MIDAZOLAM HYDROCHLORIDE 1 MG/ML
8 INJECTION, SOLUTION INTRAMUSCULAR; INTRAVENOUS ONCE
Refills: 0 | Status: DISCONTINUED | OUTPATIENT
Start: 2024-01-22 | End: 2024-01-22

## 2024-01-22 RX ORDER — PROPOFOL 10 MG/ML
40 INJECTION, EMULSION INTRAVENOUS
Qty: 1000 | Refills: 0 | Status: DISCONTINUED | OUTPATIENT
Start: 2024-01-22 | End: 2024-01-24

## 2024-01-22 RX ORDER — FENTANYL CITRATE 50 UG/ML
100 INJECTION INTRAVENOUS ONCE
Refills: 0 | Status: DISCONTINUED | OUTPATIENT
Start: 2024-01-22 | End: 2024-01-22

## 2024-01-22 RX ORDER — POLYETHYLENE GLYCOL 3350 17 G/17G
17 POWDER, FOR SOLUTION ORAL DAILY
Refills: 0 | Status: DISCONTINUED | OUTPATIENT
Start: 2024-01-22 | End: 2024-01-24

## 2024-01-22 RX ORDER — FUROSEMIDE 40 MG
40 TABLET ORAL ONCE
Refills: 0 | Status: COMPLETED | OUTPATIENT
Start: 2024-01-22 | End: 2024-01-22

## 2024-01-22 RX ORDER — SODIUM,POTASSIUM PHOSPHATES 278-250MG
1 POWDER IN PACKET (EA) ORAL ONCE
Refills: 0 | Status: COMPLETED | OUTPATIENT
Start: 2024-01-22 | End: 2024-01-22

## 2024-01-22 RX ADMIN — Medication 10 MILLIGRAM(S): at 06:00

## 2024-01-22 RX ADMIN — TICAGRELOR 60 MILLIGRAM(S): 90 TABLET ORAL at 17:47

## 2024-01-22 RX ADMIN — Medication 1 TABLET(S): at 18:57

## 2024-01-22 RX ADMIN — MUPIROCIN 1 APPLICATION(S): 20 OINTMENT TOPICAL at 06:01

## 2024-01-22 RX ADMIN — Medication 8: at 17:48

## 2024-01-22 RX ADMIN — Medication 6: at 23:01

## 2024-01-22 RX ADMIN — PROPOFOL 19 MICROGRAM(S)/KG/MIN: 10 INJECTION, EMULSION INTRAVENOUS at 19:57

## 2024-01-22 RX ADMIN — Medication 100 GRAM(S): at 05:59

## 2024-01-22 RX ADMIN — Medication 40 MILLIGRAM(S): at 15:47

## 2024-01-22 RX ADMIN — Medication 25 MILLIGRAM(S): at 00:14

## 2024-01-22 RX ADMIN — LEVETIRACETAM 400 MILLIGRAM(S): 250 TABLET, FILM COATED ORAL at 17:50

## 2024-01-22 RX ADMIN — Medication 7.43 MICROGRAM(S)/KG/MIN: at 19:57

## 2024-01-22 RX ADMIN — LIDOCAINE 1 PATCH: 4 CREAM TOPICAL at 07:01

## 2024-01-22 RX ADMIN — AMLODIPINE BESYLATE 5 MILLIGRAM(S): 2.5 TABLET ORAL at 06:00

## 2024-01-22 RX ADMIN — PROPOFOL 6 MILLIGRAM(S): 10 INJECTION, EMULSION INTRAVENOUS at 15:28

## 2024-01-22 RX ADMIN — RANOLAZINE 500 MILLIGRAM(S): 500 TABLET, FILM COATED, EXTENDED RELEASE ORAL at 06:00

## 2024-01-22 RX ADMIN — PIPERACILLIN AND TAZOBACTAM 25 GRAM(S): 4; .5 INJECTION, POWDER, LYOPHILIZED, FOR SOLUTION INTRAVENOUS at 05:59

## 2024-01-22 RX ADMIN — ESCITALOPRAM OXALATE 10 MILLIGRAM(S): 10 TABLET, FILM COATED ORAL at 11:14

## 2024-01-22 RX ADMIN — FENTANYL CITRATE 100 MICROGRAM(S): 50 INJECTION INTRAVENOUS at 15:05

## 2024-01-22 RX ADMIN — CHLORHEXIDINE GLUCONATE 1 APPLICATION(S): 213 SOLUTION TOPICAL at 11:15

## 2024-01-22 RX ADMIN — SENNA PLUS 2 TABLET(S): 8.6 TABLET ORAL at 22:04

## 2024-01-22 RX ADMIN — Medication 3 MILLILITER(S): at 03:48

## 2024-01-22 RX ADMIN — Medication 40 MILLIEQUIVALENT(S): at 06:00

## 2024-01-22 RX ADMIN — TICAGRELOR 60 MILLIGRAM(S): 90 TABLET ORAL at 00:14

## 2024-01-22 RX ADMIN — QUETIAPINE FUMARATE 12.5 MILLIGRAM(S): 200 TABLET, FILM COATED ORAL at 22:04

## 2024-01-22 RX ADMIN — Medication 25 MILLIGRAM(S): at 06:02

## 2024-01-22 RX ADMIN — TICAGRELOR 60 MILLIGRAM(S): 90 TABLET ORAL at 06:01

## 2024-01-22 RX ADMIN — CHLORHEXIDINE GLUCONATE 15 MILLILITER(S): 213 SOLUTION TOPICAL at 17:50

## 2024-01-22 RX ADMIN — INSULIN GLARGINE 7 UNIT(S): 100 INJECTION, SOLUTION SUBCUTANEOUS at 22:04

## 2024-01-22 RX ADMIN — Medication 1 GRAM(S): at 06:01

## 2024-01-22 RX ADMIN — SODIUM CHLORIDE 4 MILLILITER(S): 9 INJECTION INTRAMUSCULAR; INTRAVENOUS; SUBCUTANEOUS at 22:42

## 2024-01-22 RX ADMIN — DEXMEDETOMIDINE HYDROCHLORIDE IN 0.9% SODIUM CHLORIDE 1.98 MICROGRAM(S)/KG/HR: 4 INJECTION INTRAVENOUS at 08:02

## 2024-01-22 RX ADMIN — Medication 2: at 06:35

## 2024-01-22 RX ADMIN — Medication 3 MILLILITER(S): at 22:42

## 2024-01-22 RX ADMIN — POLYETHYLENE GLYCOL 3350 17 GRAM(S): 17 POWDER, FOR SOLUTION ORAL at 11:14

## 2024-01-22 RX ADMIN — SODIUM CHLORIDE 4 MILLILITER(S): 9 INJECTION INTRAMUSCULAR; INTRAVENOUS; SUBCUTANEOUS at 04:01

## 2024-01-22 RX ADMIN — PANTOPRAZOLE SODIUM 40 MILLIGRAM(S): 20 TABLET, DELAYED RELEASE ORAL at 17:47

## 2024-01-22 RX ADMIN — PIPERACILLIN AND TAZOBACTAM 25 GRAM(S): 4; .5 INJECTION, POWDER, LYOPHILIZED, FOR SOLUTION INTRAVENOUS at 14:13

## 2024-01-22 RX ADMIN — PANTOPRAZOLE SODIUM 40 MILLIGRAM(S): 20 TABLET, DELAYED RELEASE ORAL at 06:00

## 2024-01-22 RX ADMIN — HEPARIN SODIUM 5000 UNIT(S): 5000 INJECTION INTRAVENOUS; SUBCUTANEOUS at 06:00

## 2024-01-22 RX ADMIN — Medication 20 MILLIEQUIVALENT(S): at 17:58

## 2024-01-22 RX ADMIN — Medication 63.75 MILLIMOLE(S): at 18:57

## 2024-01-22 RX ADMIN — MIDAZOLAM HYDROCHLORIDE 8 MILLIGRAM(S): 1 INJECTION, SOLUTION INTRAMUSCULAR; INTRAVENOUS at 14:59

## 2024-01-22 RX ADMIN — HEPARIN SODIUM 5000 UNIT(S): 5000 INJECTION INTRAVENOUS; SUBCUTANEOUS at 14:13

## 2024-01-22 RX ADMIN — Medication 40 MILLIGRAM(S): at 14:13

## 2024-01-22 RX ADMIN — PIPERACILLIN AND TAZOBACTAM 25 GRAM(S): 4; .5 INJECTION, POWDER, LYOPHILIZED, FOR SOLUTION INTRAVENOUS at 22:03

## 2024-01-22 RX ADMIN — Medication 81 MILLIGRAM(S): at 11:14

## 2024-01-22 RX ADMIN — Medication 1 GRAM(S): at 17:48

## 2024-01-22 RX ADMIN — HEPARIN SODIUM 5000 UNIT(S): 5000 INJECTION INTRAVENOUS; SUBCUTANEOUS at 22:04

## 2024-01-22 RX ADMIN — MUPIROCIN 1 APPLICATION(S): 20 OINTMENT TOPICAL at 18:00

## 2024-01-22 RX ADMIN — SODIUM CHLORIDE 4 MILLILITER(S): 9 INJECTION INTRAMUSCULAR; INTRAVENOUS; SUBCUTANEOUS at 09:05

## 2024-01-22 RX ADMIN — FENTANYL CITRATE 100 MICROGRAM(S): 50 INJECTION INTRAVENOUS at 16:30

## 2024-01-22 RX ADMIN — Medication 3 MILLILITER(S): at 09:04

## 2024-01-22 RX ADMIN — LEVETIRACETAM 400 MILLIGRAM(S): 250 TABLET, FILM COATED ORAL at 06:34

## 2024-01-22 NOTE — CHART NOTE - NSCHARTNOTEFT_GEN_A_CORE
: Vincenzo Delgadillo    INDICATION: Critical Illness. Hypoxic respiratory failure    PROCEDURE:  [x ] LIMITED ECHO  [x ] LIMITED CHEST  [ ] LIMITED RETROPERITONEAL  [ ] LIMITED ABDOMINAL  [ ] LIMITED DVT  [ ] NEEDLE GUIDANCE VASCULAR  [ ] NEEDLE GUIDANCE THORACENTESIS  [ ] NEEDLE GUIDANCE PARACENTESIS  [ ] NEEDLE GUIDANCE PERICARDIOCENTESIS  [ ] OTHER    FINDINGS:    - B-lines present in b/l lower lung fields. Moderate sized pleural effusions present in b/l lower lungs. Fluid is predominately simple in appearance.    - EPSS of 1.0cm. LVOT VTi of 14.3cm. E linda of 1.08 m/s, DecT of 136ms, E/A ratio of 1.35, lateral E/E' 16.40.      INTERPRETATION: B/L moderate to large pleural effusions. EF estimated at 35% via visual inspection. Concern for stage II diastolic dysfunction.        Images uploaded to [email or web address] : Vincenzo Delgadillo    INDICATION: Critical Illness. acute Hypoxic respiratory failure    PROCEDURE:  [x ] LIMITED ECHO  [x ] LIMITED CHEST  [ ] LIMITED RETROPERITONEAL  [ ] LIMITED ABDOMINAL  [ ] LIMITED DVT  [ ] NEEDLE GUIDANCE VASCULAR  [ ] NEEDLE GUIDANCE THORACENTESIS  [ ] NEEDLE GUIDANCE PARACENTESIS  [ ] NEEDLE GUIDANCE PERICARDIOCENTESIS  [ ] OTHER    FINDINGS:    - B-lines present in b/l lower lung fields. Moderate sized pleural effusions present in b/l lower lungs. Fluid is predominately simple in appearance.    - EPSS of 1.0cm. LVOT VTi of 14.3cm. E linda of 1.08 m/s, DecT of 136ms, E/A ratio of 1.35, lateral E/E' 16.40.      INTERPRETATION: B/L moderate to large pleural effusions. EF estimated at 35% via visual inspection. Concern for stage II diastolic dysfunction.        Images uploaded to [employer]    Attending Attestation:  I was present during the key portions of the procedure and immediately available during the entire procedure.  Aiden Lemon MD  Attending  Pulmonary & Critical Care Medicine : Vincenzo Delgadillo    INDICATION: Critical Illness. acute Hypoxic respiratory failure    PROCEDURE:  [x ] LIMITED ECHO  [x ] LIMITED CHEST  [ ] LIMITED RETROPERITONEAL  [ ] LIMITED ABDOMINAL  [ ] LIMITED DVT  [ ] NEEDLE GUIDANCE VASCULAR  [ ] NEEDLE GUIDANCE THORACENTESIS  [ ] NEEDLE GUIDANCE PARACENTESIS  [ ] NEEDLE GUIDANCE PERICARDIOCENTESIS  [ ] OTHER    FINDINGS:    - B-lines present in b/l lower lung fields. Moderate sized pleural effusions present in b/l lower lungs. Fluid is predominately simple in appearance.    - EPSS of 1.0cm. LVOT VTi of 14.3cm by PW doppler. E linda of 1.08 m/s, DecT of 136ms, E/A ratio of 1.35, lateral E/E' 16.40.      INTERPRETATION: B/L moderate to large pleural effusions. EF estimated at 35% via visual inspection. Concern for stage II diastolic dysfunction.        Images uploaded to [employer]    Attending Attestation:  I was present during the key portions of the procedure and immediately available during the entire procedure.  Aiden Lemon MD  Attending  Pulmonary & Critical Care Medicine

## 2024-01-22 NOTE — PROGRESS NOTE ADULT - ASSESSMENT
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 Patient states that he has been having a dry cough for the past week, worsened over the past few days. Denies SOB with the cough, denies hemoptysis. Reports fevers at home to 101.5 with associated diaphoresis. Said that he has significant pinprick like b/l chest pain with his cough but denies any chest pain when not coughing or with ambulation. Also reports rhinorrhea and sore throat. Reports generalized weakness and poor appetite. States his daughter was visiting him over the week end last week and she was positive for COVID19. Patient tested positive for COVID19 2 days prior and was started on paxlovid as outpatient. Of note, family states that the patient has had worsening confusion at home over the past 6 months (becoming disoriented to time) but recently has been more confused, talking about seeing people that are not present.   On arrival to the ED his vitals were T 98 -> 99.2, P 80, /72, RR 18, ) sat 100% RA. His lab work showed leukocytosis with neutrophilia and bandemia, MABLE, mild hyponatremia, indeterminant but stable troponins, and elevated lactate to 2.3. His COVID19 swab was positive. His CXR showed bibasilar crackles.  (23 Dec 2023 22:51):  pt has been here for past two weeks and now pulm called fro excessive secretions   he is able to answer simple questions:  grand sons at bedside:     Covid / Resp failure/on 2 L of oxygen  :  CADS/P CABG  DM  CKD  HTN  CVA X 3    Covid / Resp failure/on 2 L of oxygen:  -he was treated for covid before:   -he has excessive secretions  -keep o2 sao2 above 90%  -add BD and mucomyst: ;  -add mucinex:   1/10: he seems to be doing  ok: cont mucomyst and bd   1/11 ?: add benzonatate and Robitussin prm : dc dornase  1/12: seems OK: no sob:  no cough : no phlegm : has gurgling sound in throat:  looks comfortable  1/14: he is on room air:  with good sao2:  finished covid rx:  cont current rx:  high risk for aspiration as he has gurgling secretions in throat   1/15: would do ct chest he seems to have increasing effusions:  his ct scan from last week of december:  has not much of effusions: however will try lasix : [facility]    1/16: doing  ok : no os:b has secretions still : change to percussion bed:  cont bd  1/17: seems stable:  no sob: on 3 L of oxygen : should add ATC lasix to me as he has formed new pleural effusions:  echo with mod AS   1/18; [facility] pts son : who is a neurologist:  he has secretions in chest with atelectasis and yovani effusions with increased secretions:  the son requests bronchoscopy:  I have explained the advantages and disadvantages of the bronch at this age and he might not be able to be extubated soon after procedure:  but they want to go ahead with it: IP called   He will remain art risk for recurrent aspiration and atelectasis even after the procedure and they understand that    1/19: FOR BRONCH TODAY  : AGAIN CONFIRMED WITH NEUROLOGISTS SON  1/20 : events noted:  was successful extubated after the procedure:  but then he desaturated with increasing secretions and ended up on high flow and adm to MICU : on recent chest xray has mod large effusion on rigth side:  I think it needs a tap:  would defer to [facility]   ; [facility] cultures are still pending  1/21: dw fellow  consider tapping on rigth saide:  his PCP and son at bedside:  he is not obviously sob but still on 100%  high flow   1/22: he is doing poorly:  WBC increased:  bronch culturs with staph : nares with staph ? add vanco:  defer to MICU : in addition:  he has large effusion 0on rigth side: ? chest tube:  but brillinta needs to be stopped : [facility] attending  CADS/P CABG  -cont current medications   DM  monitor and control   CKD  -cont current meds   HTN   -controlled  CVA X 3  -doing ok :     dw family  and team  GI Bleed:   -secondary to Dieulafoy's lesion:  defer to primary team :    dvt prophylaxis    dw MICU team

## 2024-01-22 NOTE — PROGRESS NOTE ADULT - ATTENDING COMMENTS
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) admitted to MICU for acute respiratory failure, worsening s/p bronchoscopy 1/19, worsening hypoxemia and mental status today requiring intubation (1/22).      #Acute hypoxemic respiratory failure  #Staph Aureus Pneumonia  #Dysphagia  - c/w sedation for now  - c/w full vent support for MSSA pna and aspiration pneumonia  - restart tube feeds   - c/w zosyn, f/u bronch cultures    overall poor prognosis given recurrent aspiration events, ongoing goc with family.  Had again today with multiple family members in evening via phone; they are hopeful for functional recovery 89 yo M w/ CAD s/p CABG s/p stents (last stent 5/2022), s/p PPM, HTN, HLD, T2DM, CKD, PVD, prior CA x3 (without residual deficits), and Myoclonic Jerks (on Keppra) admitted to MICU for acute respiratory failure, worsening s/p bronchoscopy 1/19, worsening hypoxemia and mental status today requiring intubation (1/22).      #Acute hypoxemic respiratory failure  #Staph Aureus Pneumonia  #Dysphagia  - c/w sedation for now  - c/w full vent support for MSSA pna and aspiration pneumonia  - restart tube feeds   - c/w zosyn, f/u bronch cultures    family asking for thoracentesis.  Trying to clarify from cards and vascular re: stopping Brilinta.  Given bilious fluid seen on bronch, suspect his hypoxemia is coming from aspiration and not pleural effusions.  Given he is on Brilinta, do not think benefits of bilateral thoracenteses outweighs risks of non-urgent thora on anti-platelet. Family in agreement.    overall poor prognosis given recurrent aspiration events, ongoing goc with family.  Had again today with multiple family members in evening via phone; they are hopeful for functional recovery

## 2024-01-22 NOTE — PROGRESS NOTE ADULT - ASSESSMENT
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) who presents to the hospital for COVID19 infection and chest pain  MABLE ----improving   Hypophosphatemia - resolved   Hypertensive urgency -resolved --- BP meds as ordered   resp failure - on HFNC    1 Renal - crt trending up but stable , on lasix 10 mg iv daily   hypervolumic   hypokalemia supplemented  4 CV - BP controlled   5 Vasc - s/p SMA stent placement;   6 Sx - s/p HIDA + for acute asa, medical management, on puree diet , encourage free water in take too (if allowed  with aspiration precautions)   7 GI - s/p EGD clipping of bleeding duodenal ulcers   8 Anemia- hgb stable   9 Pul-chest PT , sp bronch , b/l moderated effusion with atelectasis and ground glass opacities   CXR with b/l effusion         Elsa loli  [facility]  Office: [phone]   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) who presents to the hospital for COVID19 infection and chest pain  MABLE ----improving   Hypophosphatemia - resolved   Hypertensive urgency -resolved --- BP meds as ordered   resp failure - on HFNC    1 Renal - crt trending up but stable ,  hypervolumic   hypokalemia supplemented  4 CV - BP controlled   5 Vasc - s/p SMA stent placement;   6 Sx - s/p HIDA + for acute asa, medical management, on puree diet , encourage free water in take too (if allowed  with aspiration precautions)   7 GI - s/p EGD clipping of bleeding duodenal ulcers   8 Anemia- hgb stable   9 Pul-chest PT , sp bronch , b/l moderated effusion with atelectasis and ground glass opacities   CXR with b/l effusion         Elsa Nunez  [facility]  Office: [phone]   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) who presents to the hospital for COVID19 infection and chest pain  MABLE ----improving   Hypophosphatemia - resolved   Hypertensive urgency -resolved --- BP meds as ordered   resp failure - on HFNC    1 Renal - crt trending up but stable , lasix 40 mg iv once today   hypervolumic   hypokalemia supplemented  4 CV - BP controlled   5 Vasc - s/p SMA stent placement;   6 Sx - s/p HIDA + for acute asa, medical management, on puree diet , encourage free water in take too (if allowed  with aspiration precautions)   7 GI - s/p EGD clipping of bleeding duodenal ulcers   8 Anemia- hgb stable   9 Pul-chest PT , sp bronch , b/l moderated effusion with atelectasis and ground glass opacities   CXR with b/l effusion         Elsamayela Nunez  [facility]  Office: [phone]

## 2024-01-22 NOTE — PROGRESS NOTE ADULT - SUBJECTIVE AND OBJECTIVE BOX
PROGRESS NOTE:   Authored by Dr. Beata Wahl MD (PGY-1). Pager [facility] [phone] / KATHIA ( [id number]) or via TEAMS    Patient is a 89y old  Male who presents with a chief complaint of COVID19, Chest pain (21 Jan 2024 15:31)      SUBJECTIVE / OVERNIGHT EVENTS:  No acute events overnight.     ADDITIONAL REVIEW OF SYSTEMS:  Patient denies fevers, chills, chest pain, shortness of breath, nausea, abdominal pain, diarrhea, constipation, dysuria, leg swelling, headache, light headedness.    MEDICATIONS  (STANDING):  albuterol/ipratropium for Nebulization 3 milliLiter(s) Nebulizer every 6 hours  amLODIPine   Tablet 5 milliGRAM(s) Oral daily  aspirin  chewable 81 milliGRAM(s) Oral daily  chlorhexidine 2% Cloths 1 Application(s) Topical daily  dexMEDEtomidine Infusion 0.1 MICROgram(s)/kG/Hr (1.98 mL/Hr) IV Continuous <Continuous>  escitalopram 10 milliGRAM(s) Oral daily  furosemide   Injectable 10 milliGRAM(s) IV Push daily  heparin   Injectable 5000 Unit(s) SubCutaneous every 8 hours  insulin glargine Injectable (LANTUS) 7 Unit(s) SubCutaneous at bedtime  insulin lispro (ADMELOG) corrective regimen sliding scale   SubCutaneous every 6 hours  levETIRAcetam  IVPB 250 milliGRAM(s) IV Intermittent every 12 hours  lidocaine   4% Patch 1 Patch Transdermal every 24 hours  metoprolol tartrate 25 milliGRAM(s) Oral two times a day  mupirocin 2% Ointment 1 Application(s) Topical two times a day  pantoprazole  Injectable 40 milliGRAM(s) IV Push every 12 hours  piperacillin/tazobactam IVPB.. 3.375 Gram(s) IV Intermittent every 8 hours  QUEtiapine 12.5 milliGRAM(s) Oral at bedtime  ranolazine 500 milliGRAM(s) Oral two times a day  sodium chloride 3%  Inhalation 4 milliLiter(s) Inhalation every 6 hours  sucralfate 1 Gram(s) Oral every 12 hours  ticagrelor 60 milliGRAM(s) Oral every 12 hours    MEDICATIONS  (PRN):  acetaminophen     Tablet .. 650 milliGRAM(s) Oral every 6 hours PRN Temp greater or equal to 38C (100.4F), Mild Pain (1 - 3), Moderate Pain (4 - 6)  guaiFENesin Oral Liquid (Sugar-Free) 100 milliGRAM(s) Oral every 6 hours PRN Cough      CAPILLARY BLOOD GLUCOSE      POCT Blood Glucose.: 265 mg/dL (22 Jan 2024 06:33)  POCT Blood Glucose.: 228 mg/dL (21 Jan 2024 22:55)  POCT Blood Glucose.: 209 mg/dL (21 Jan 2024 17:30)  POCT Blood Glucose.: 184 mg/dL (21 Jan 2024 11:08)    I&O's Summary    21 Jan 2024 07:01  -  22 Jan 2024 07:00  --------------------------------------------------------  IN: 837.2 mL / OUT: 910 mL / NET: -72.8 mL        PHYSICAL EXAM:  Vital Signs Last 24 Hrs  T(C): 36.1 (22 Jan 2024 04:00), Max: 37 (21 Jan 2024 08:00)  T(F): 97 (22 Jan 2024 04:00), Max: 98.6 (21 Jan 2024 08:00)  HR: 87 (22 Jan 2024 06:00) (73 - 92)  BP: 141/71 (22 Jan 2024 06:00) (105/42 - 147/52)  BP(mean): 88 (22 Jan 2024 06:00) (55 - 96)  RR: 23 (22 Jan 2024 06:00) (14 - 36)  SpO2: 90% (22 Jan 2024 06:00) (90% - 100%)    Parameters below as of 22 Jan 2024 06:00  Patient On (Oxygen Delivery Method): nasal cannula, high flow  O2 Flow (L/min): 50  O2 Concentration (%): 100    CONSTITUTIONAL: NAD, well-developed  RESPIRATORY: Normal respiratory effort; lungs are clear to auscultation bilaterally  CARDIOVASCULAR: Regular rate and rhythm, normal S1 and S2, no murmur/rub/gallop; No lower extremity edema; Peripheral pulses are 2+ bilaterally  ABDOMEN: Nontender to palpation, normoactive bowel sounds, no rebound/guarding; No hepatosplenomegaly  MSK: no clubbing or cyanosis of digits; no joint swelling or tenderness to palpation  PSYCH: A+O to person, place, and time; affect appropriate    LABS:                        8.7    21.18 )-----------( 298      ( 22 Jan 2024 03:53 )             26.6     01-22    139  |  97<L>  |  24<H>  ----------------------------<  240<H>  3.4<L>   |  25  |  1.79<H>    Ca    8.5      22 Jan 2024 03:53  Phos  3.7     01-22  Mg     1.90     01-22    TPro  7.1  /  Alb  2.6<L>  /  TBili  0.7  /  DBili  x   /  AST  25  /  ALT  22  /  AlkPhos  78  01-22    PT/INR - ( 22 Jan 2024 03:53 )   PT: 14.5 sec;   INR: 1.30 ratio         PTT - ( 22 Jan 2024 03:53 )  PTT:37.4 sec      Urinalysis Basic - ( 22 Jan 2024 03:53 )    Color: x / Appearance: x / SG: x / pH: x  Gluc: 240 mg/dL / Ketone: x  / Bili: x / Urobili: x   Blood: x / Protein: x / Nitrite: x   Leuk Esterase: x / RBC: x / WBC x   Sq Epi: x / Non Sq Epi: x / Bacteria: x        Culture - Blood (collected 20 Jan 2024 13:59)  Source: .Blood Blood-Peripheral  Preliminary Report (21 Jan 2024 17:01):    No growth at 24 hours    Culture - Blood (collected 20 Jan 2024 13:49)  Source: .Blood Blood-Peripheral  Preliminary Report (21 Jan 2024 17:01):    No growth at 24 hours    Culture - Acid Fast - Bronchial w/Smear (collected 19 Jan 2024 11:35)  Source: .Bronchial R MIDDLE LOBE    Culture - Fungal, Bronchial (collected 19 Jan 2024 11:35)  Source: .Bronchial R MIDDLE LOBE  Preliminary Report (20 Jan 2024 07:31):    Testing in progress    Culture - Bronchial (collected 19 Jan 2024 11:35)  Source: .Bronchial R MIDDLE LOBE  Gram Stain (19 Jan 2024 23:37):    Moderate polymorphonuclear leukocytes per low power field    No squamous epithelial cells per low power field    Numerous Gram Negative Rods per oil power field    Few Gram Positive Cocci in Clusters per oil power field  Final Report (21 Jan 2024 09:44):    Numerous Staphylococcus aureus    Normal Respiratory Aurelia present  Organism: Staphylococcus aureus (21 Jan 2024 09:44)  Organism: Staphylococcus aureus (21 Jan 2024 09:44)        Tele Reviewed:    RADIOLOGY & ADDITIONAL TESTS:  Results Reviewed:   Imaging Personally Reviewed:  Electrocardiogram Personally Reviewed:     PROGRESS NOTE:   Authored by Dr. Beata Wahl MD (PGY-1). Pager [facility] [phone] / KATHIA ( [phone]) or via TEAMS    Patient is a 89y old  Male who presents with a chief complaint of COVID19, Chest pain (21 Jan 2024 15:31)      SUBJECTIVE / OVERNIGHT EVENTS:  No acute events overnight. Pt started on low dose Seroquel overnight, son at bedside says patient slept well, was stating he is hungry overnight. On high devora.     MEDICATIONS  (STANDING):  albuterol/ipratropium for Nebulization 3 milliLiter(s) Nebulizer every 6 hours  amLODIPine   Tablet 5 milliGRAM(s) Oral daily  aspirin  chewable 81 milliGRAM(s) Oral daily  chlorhexidine 2% Cloths 1 Application(s) Topical daily  dexMEDEtomidine Infusion 0.1 MICROgram(s)/kG/Hr (1.98 mL/Hr) IV Continuous <Continuous>  escitalopram 10 milliGRAM(s) Oral daily  furosemide   Injectable 10 milliGRAM(s) IV Push daily  heparin   Injectable 5000 Unit(s) SubCutaneous every 8 hours  insulin glargine Injectable (LANTUS) 7 Unit(s) SubCutaneous at bedtime  insulin lispro (ADMELOG) corrective regimen sliding scale   SubCutaneous every 6 hours  levETIRAcetam  IVPB 250 milliGRAM(s) IV Intermittent every 12 hours  lidocaine   4% Patch 1 Patch Transdermal every 24 hours  metoprolol tartrate 25 milliGRAM(s) Oral two times a day  mupirocin 2% Ointment 1 Application(s) Topical two times a day  pantoprazole  Injectable 40 milliGRAM(s) IV Push every 12 hours  piperacillin/tazobactam IVPB.. 3.375 Gram(s) IV Intermittent every 8 hours  QUEtiapine 12.5 milliGRAM(s) Oral at bedtime  ranolazine 500 milliGRAM(s) Oral two times a day  sodium chloride 3%  Inhalation 4 milliLiter(s) Inhalation every 6 hours  sucralfate 1 Gram(s) Oral every 12 hours  ticagrelor 60 milliGRAM(s) Oral every 12 hours    MEDICATIONS  (PRN):  acetaminophen     Tablet .. 650 milliGRAM(s) Oral every 6 hours PRN Temp greater or equal to 38C (100.4F), Mild Pain (1 - 3), Moderate Pain (4 - 6)  guaiFENesin Oral Liquid (Sugar-Free) 100 milliGRAM(s) Oral every 6 hours PRN Cough      CAPILLARY BLOOD GLUCOSE      POCT Blood Glucose.: 265 mg/dL (22 Jan 2024 06:33)  POCT Blood Glucose.: 228 mg/dL (21 Jan 2024 22:55)  POCT Blood Glucose.: 209 mg/dL (21 Jan 2024 17:30)  POCT Blood Glucose.: 184 mg/dL (21 Jan 2024 11:08)    I&O's Summary    21 Jan 2024 07:01  -  22 Jan 2024 07:00  --------------------------------------------------------  IN: 837.2 mL / OUT: 910 mL / NET: -72.8 mL        PHYSICAL EXAM:  Vital Signs Last 24 Hrs  T(C): 36.1 (22 Jan 2024 04:00), Max: 37 (21 Jan 2024 08:00)  T(F): 97 (22 Jan 2024 04:00), Max: 98.6 (21 Jan 2024 08:00)  HR: 87 (22 Jan 2024 06:00) (73 - 92)  BP: 141/71 (22 Jan 2024 06:00) (105/42 - 147/52)  BP(mean): 88 (22 Jan 2024 06:00) (55 - 96)  RR: 23 (22 Jan 2024 06:00) (14 - 36)  SpO2: 90% (22 Jan 2024 06:00) (90% - 100%)    Parameters below as of 22 Jan 2024 06:00  Patient On (Oxygen Delivery Method): nasal cannula, high flow  O2 Flow (L/min): 50  O2 Concentration (%): 100    CONSTITUTIONAL: NAD, feeds running no acute distress   RESPIRATORY: rhonchorous breath sounds   CARDIOVASCULAR: Regular rate and rhythm, normal S1 and S2, no murmur/rub/gallop; No lower extremity edema; Peripheral pulses are 2+ bilaterally  ABDOMEN: Nontender to palpation, normoactive bowel sounds, no rebound/guarding; No hepatosplenomegaly  MSK: no clubbing or cyanosis of digits; no joint swelling or tenderness to palpation  PSYCH: sleeping however a&O x2  LABS:                        8.7    21.18 )-----------( 298      ( 22 Jan 2024 03:53 )             26.6     01-22    139  |  97<L>  |  24<H>  ----------------------------<  240<H>  3.4<L>   |  25  |  1.79<H>    Ca    8.5      22 Jan 2024 03:53  Phos  3.7     01-22  Mg     1.90     01-22    TPro  7.1  /  Alb  2.6<L>  /  TBili  0.7  /  DBili  x   /  AST  25  /  ALT  22  /  AlkPhos  78  01-22    PT/INR - ( 22 Jan 2024 03:53 )   PT: 14.5 sec;   INR: 1.30 ratio         PTT - ( 22 Jan 2024 03:53 )  PTT:37.4 sec      Urinalysis Basic - ( 22 Jan 2024 03:53 )    Color: x / Appearance: x / SG: x / pH: x  Gluc: 240 mg/dL / Ketone: x  / Bili: x / Urobili: x   Blood: x / Protein: x / Nitrite: x   Leuk Esterase: x / RBC: x / WBC x   Sq Epi: x / Non Sq Epi: x / Bacteria: x        Culture - Blood (collected 20 Jan 2024 13:59)  Source: .Blood Blood-Peripheral  Preliminary Report (21 Jan 2024 17:01):    No growth at 24 hours    Culture - Blood (collected 20 Jan 2024 13:49)  Source: .Blood Blood-Peripheral  Preliminary Report (21 Jan 2024 17:01):    No growth at 24 hours    Culture - Acid Fast - Bronchial w/Smear (collected 19 Jan 2024 11:35)  Source: .Bronchial R MIDDLE LOBE    Culture - Fungal, Bronchial (collected 19 Jan 2024 11:35)  Source: .Bronchial R MIDDLE LOBE  Preliminary Report (20 Jan 2024 07:31):    Testing in progress    Culture - Bronchial (collected 19 Jan 2024 11:35)  Source: .Bronchial R MIDDLE LOBE  Gram Stain (19 Jan 2024 23:37):    Moderate polymorphonuclear leukocytes per low power field    No squamous epithelial cells per low power field    Numerous Gram Negative Rods per oil power field    Few Gram Positive Cocci in Clusters per oil power field  Final Report (21 Jan 2024 09:44):    Numerous Staphylococcus aureus    Normal Respiratory Aurelia present  Organism: Staphylococcus aureus (21 Jan 2024 09:44)  Organism: Staphylococcus aureus (21 Jan 2024 09:44)        Tele Reviewed:    RADIOLOGY & ADDITIONAL TESTS:  Results Reviewed:   Imaging Personally Reviewed:  Electrocardiogram Personally Reviewed:

## 2024-01-22 NOTE — PROGRESS NOTE ADULT - SUBJECTIVE AND OBJECTIVE BOX
Aashish Boyer MD  Interventional Cardiology / Advance Heart Failure and Cardiac Transplant Specialist  [place] Office : [address] [place] [address] [place] [address]  Tel:   [place] [facility] : [phone] [facility] [place] [address]  Tel: [phone]       Pt is lying in bed SOB on non rebreather  	  MEDICATIONS:  amLODIPine   Tablet 5 milliGRAM(s) Oral daily  aspirin  chewable 81 milliGRAM(s) Oral daily  furosemide   Injectable 10 milliGRAM(s) IV Push daily  heparin   Injectable 5000 Unit(s) SubCutaneous every 8 hours  metoprolol tartrate 25 milliGRAM(s) Oral two times a day  ranolazine 500 milliGRAM(s) Oral two times a day  ticagrelor 60 milliGRAM(s) Oral every 12 hours  piperacillin/tazobactam IVPB.. 3.375 Gram(s) IV Intermittent every 8 hours  albuterol/ipratropium for Nebulization 3 milliLiter(s) Nebulizer every 6 hours  guaiFENesin Oral Liquid (Sugar-Free) 100 milliGRAM(s) Oral every 6 hours PRN  sodium chloride 3%  Inhalation 4 milliLiter(s) Inhalation every 6 hours  acetaminophen     Tablet .. 650 milliGRAM(s) Oral every 6 hours PRN  dexMEDEtomidine Infusion 0.1 MICROgram(s)/kG/Hr IV Continuous <Continuous>  escitalopram 10 milliGRAM(s) Oral daily  fentaNYL    Injectable 100 MICROGram(s) IV Push once  levETIRAcetam  IVPB 250 milliGRAM(s) IV Intermittent every 12 hours  midazolam Injectable 8 milliGRAM(s) IV Push once  propofol Injectable 6 milliGRAM(s) IV Push once  QUEtiapine 12.5 milliGRAM(s) Oral at bedtime  pantoprazole  Injectable 40 milliGRAM(s) IV Push every 12 hours  polyethylene glycol 3350 17 Gram(s) Oral daily  senna 2 Tablet(s) Oral at bedtime  sucralfate 1 Gram(s) Oral every 12 hours  insulin glargine Injectable (LANTUS) 7 Unit(s) SubCutaneous at bedtime  insulin lispro (ADMELOG) corrective regimen sliding scale   SubCutaneous every 6 hours  methylPREDNISolone sodium succinate Injectable 40 milliGRAM(s) IV Push daily  chlorhexidine 0.12% Liquid 15 milliLiter(s) Oral Mucosa every 12 hours  chlorhexidine 2% Cloths 1 Application(s) Topical daily  lidocaine   4% Patch 1 Patch Transdermal every 24 hours  mupirocin 2% Ointment 1 Application(s) Topical two times a day      PAST MEDICAL/SURGICAL HISTORY  PAST MEDICAL & SURGICAL HISTORY:  Hyperlipemia      Hypertension      Coronary Artery Disease      Diabetes Mellitus Type II      Stented Coronary Artery  total 5 stents, last stent 5/2019      Neuropathy      Myocardial infarction      Stroke  mild left facial numbness   no other residuals verbalized      Myoclonic jerking      Stage 3 chronic kidney disease      History of Cataract Extraction      Hx of CABG      H/O coronary angiogram      S/P coronary artery stent placement  1/6/09      S/P placement of cardiac pacemaker          SOCIAL HISTORY: Substance Use (street drugs): ( x ) never used  (  ) other:    FAMILY HISTORY:  No pertinent family history in first degree relatives           PHYSICAL EXAM:  T(C): 36.1 (01-22-24 @ 08:00), Max: 37 (01-21-24 @ 16:00)  HR: 88 (01-22-24 @ 15:01) (73 - 94)  BP: 144/63 (01-22-24 @ 15:01) (105/42 - 153/72)  RR: 27 (01-22-24 @ 15:01) (14 - 47)  SpO2: 94% (01-22-24 @ 15:01) (70% - 100%)  Wt(kg): --  I&O's Summary    21 Jan 2024 07:01  -  22 Jan 2024 07:00  --------------------------------------------------------  IN: 837.2 mL / OUT: 910 mL / NET: -72.8 mL    22 Jan 2024 07:01  -  22 Jan 2024 15:15  --------------------------------------------------------  IN: 322 mL / OUT: 540 mL / NET: -218 mL        EYES:   PERRLA   ENMT:   Moist mucous membranes, Good dentition, No lesions  Cardiovascular: Normal S1 S2, No JVD, No murmurs, No edema  Respiratory: b/l rhonchi   Gastrointestinal:  Soft, Non-tender, + BS	  Extremities: no edema                                    8.7    21.18 )-----------( 298      ( 22 Jan 2024 03:53 )             26.6     01-22    139  |  97<L>  |  24<H>  ----------------------------<  240<H>  3.4<L>   |  25  |  1.79<H>    Ca    8.5      22 Jan 2024 03:53  Phos  3.7     01-22  Mg     1.90     01-22    TPro  7.1  /  Alb  2.6<L>  /  TBili  0.7  /  DBili  x   /  AST  25  /  ALT  22  /  AlkPhos  78  01-22    proBNP:   Lipid Profile:   HgA1c:   TSH:     Consultant(s) Notes Reviewed:  [x ] YES  [ ] NO    Care Discussed with Consultants/Other Providers [ x] YES  [ ] NO    Imaging Personally Reviewed independently:  [x] YES  [ ] NO    All labs, radiologic studies, vitals, orders and medications list reviewed. Patient is seen and examined at bedside. Case discussed with medical team.

## 2024-01-22 NOTE — PROGRESS NOTE ADULT - ASSESSMENT
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     EKG SR RBBB (old per family)     1) Hypoxia   - s/p bronch   - transferred to MICU on non rebreather sats 88% family deciding intubation  - Euvolemic on exma , Rt pleural effusion     2) CAD s/p CABG  -p/w chest pain. EKG non ischemic , trop -sera   - echo with normal lv and moderate AS   - c/w metoprolol   - chest pains  Trop mildly elevated and trending down likley sec to kidney disease,       3) Covid +  - s/p remdesivir   - c/w supportive management   - t/t per primary team     4) Abd distension   -CTA AP obtained which showed  partially occlusive calcified and non-calcified plaque just distal to take-off of the SMA, with estimated at least 75% luminal narrowing. Limited evaluation of its distal branches. 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   -on Protonix IV q 12

## 2024-01-22 NOTE — PROGRESS NOTE ADULT - SUBJECTIVE AND OBJECTIVE BOX
Nephrology Progress Note    Patient is a 89y male breathing stable on HFNC.    Allergies:  fluoroquinolone antibiotics (Other)  Cipro (Unknown)  Tegretol (Unknown)  carbamazepine (Other)    Hospital Medications:   MEDICATIONS  (STANDING):  albuterol/ipratropium for Nebulization 3 milliLiter(s) Nebulizer every 6 hours  amLODIPine   Tablet 5 milliGRAM(s) Oral daily  aspirin  chewable 81 milliGRAM(s) Oral daily  chlorhexidine 2% Cloths 1 Application(s) Topical daily  dexMEDEtomidine Infusion 0.1 MICROgram(s)/kG/Hr (1.98 mL/Hr) IV Continuous <Continuous>  escitalopram 10 milliGRAM(s) Oral daily  furosemide   Injectable 10 milliGRAM(s) IV Push daily  heparin   Injectable 5000 Unit(s) SubCutaneous every 8 hours  insulin glargine Injectable (LANTUS) 7 Unit(s) SubCutaneous at bedtime  insulin lispro (ADMELOG) corrective regimen sliding scale   SubCutaneous every 6 hours  levETIRAcetam  IVPB 250 milliGRAM(s) IV Intermittent every 12 hours  lidocaine   4% Patch 1 Patch Transdermal every 24 hours  metoprolol tartrate 25 milliGRAM(s) Oral two times a day  mupirocin 2% Ointment 1 Application(s) Topical two times a day  pantoprazole  Injectable 40 milliGRAM(s) IV Push every 12 hours  piperacillin/tazobactam IVPB.. 3.375 Gram(s) IV Intermittent every 8 hours  QUEtiapine 12.5 milliGRAM(s) Oral at bedtime  ranolazine 500 milliGRAM(s) Oral two times a day  sodium chloride 3%  Inhalation 4 milliLiter(s) Inhalation every 6 hours  sucralfate 1 Gram(s) Oral every 12 hours  ticagrelor 60 milliGRAM(s) Oral every 12 hours        VITALS:  T(F): 98 (01-21-24 @ 12:00), Max: 100.7 (01-20-24 @ 16:00)  HR: 90 (01-21-24 @ 14:40)  BP: 127/51 (01-21-24 @ 14:00)  RR: 23 (01-21-24 @ 14:40)  SpO2: 94% (01-21-24 @ 14:40)      01-19 @ 07:01  -  01-20 @ 07:00  --------------------------------------------------------  IN: 50 mL / OUT: 530 mL / NET: -480 mL    01-20 @ 07:01 - 01-21 @ 07:00  --------------------------------------------------------  IN: 700 mL / OUT: 880 mL / NET: -180 mL    01-21 @ 07:01 - 01-21 @ 15:31  --------------------------------------------------------  IN: 313.9 mL / OUT: 335 mL / NET: -21.1 mL        PHYSICAL EXAM:  Constitutional: NAD  Neck: No JVD  Respiratory: decreased on left  Cardiovascular: S1, S2, RRR  Gastrointestinal: BS+, soft, NT/ND  Extremities: No cyanosis or clubbing. No peripheral edema  Neurological: A/O x 3  Psychiatric: Normal mood, normal affect  : + delong.   Skin: No rashes    LABS:  01-21    138  |  100  |  20  ----------------------------<  169<H>  3.2<L>   |  25  |  1.72<H>    Ca    8.3<L>      21 Jan 2024 04:05  Phos  2.9     01-21  Mg     2.00     01-21    TPro  6.7  /  Alb  2.6<L>  /  TBili  0.8  /  DBili      /  AST  21  /  ALT  17  /  AlkPhos  67  01-21                          8.4    17.58 )-----------( 274      ( 21 Jan 2024 04:05 )             26.1           RADIOLOGY & ADDITIONAL STUDIES:  INTERPRETATION:  EXAMINATION: XR CHEST URGENT    CLINICAL INDICATION: Desaturation    TECHNIQUE: Single frontal, portable view ofthe chest was obtained.    COMPARISON: Chest x-ray 1/19/2024.    FINDINGS:  Partial visualization of a left-sided cardiac device leads in right   atrium and right ventricle.  Sternotomy wires.  The heart is not accurately assessed in this AP projection.  Moderate-sized right pleural effusion, slightly worsened compared to   prior.  Trace left pleural effusion.  No acute bony abnormality.    IMPRESSION:  Moderate-sized right pleural effusion, slightly worsened compared to   prior.    --- End of Report ---     Nephrology Progress Note    Patient is a 89y male breathing stable on venti mask     Allergies:  fluoroquinolone antibiotics (Other)  Cipro (Unknown)  Tegretol (Unknown)  carbamazepine (Other)    Hospital Medications:   MEDICATIONS  (STANDING):  albuterol/ipratropium for Nebulization 3 milliLiter(s) Nebulizer every 6 hours  amLODIPine   Tablet 5 milliGRAM(s) Oral daily  aspirin  chewable 81 milliGRAM(s) Oral daily  chlorhexidine 2% Cloths 1 Application(s) Topical daily  dexMEDEtomidine Infusion 0.1 MICROgram(s)/kG/Hr (1.98 mL/Hr) IV Continuous <Continuous>  escitalopram 10 milliGRAM(s) Oral daily  furosemide   Injectable 10 milliGRAM(s) IV Push daily  heparin   Injectable 5000 Unit(s) SubCutaneous every 8 hours  insulin glargine Injectable (LANTUS) 7 Unit(s) SubCutaneous at bedtime  insulin lispro (ADMELOG) corrective regimen sliding scale   SubCutaneous every 6 hours  levETIRAcetam  IVPB 250 milliGRAM(s) IV Intermittent every 12 hours  lidocaine   4% Patch 1 Patch Transdermal every 24 hours  metoprolol tartrate 25 milliGRAM(s) Oral two times a day  mupirocin 2% Ointment 1 Application(s) Topical two times a day  pantoprazole  Injectable 40 milliGRAM(s) IV Push every 12 hours  piperacillin/tazobactam IVPB.. 3.375 Gram(s) IV Intermittent every 8 hours  QUEtiapine 12.5 milliGRAM(s) Oral at bedtime  ranolazine 500 milliGRAM(s) Oral two times a day  sodium chloride 3%  Inhalation 4 milliLiter(s) Inhalation every 6 hours  sucralfate 1 Gram(s) Oral every 12 hours  ticagrelor 60 milliGRAM(s) Oral every 12 hours        VITALS:  T(F): 98 (01-21-24 @ 12:00), Max: 100.7 (01-20-24 @ 16:00)  HR: 90 (01-21-24 @ 14:40)  BP: 127/51 (01-21-24 @ 14:00)  RR: 23 (01-21-24 @ 14:40)  SpO2: 94% (01-21-24 @ 14:40)      01-19 @ 07:01  -  01-20 @ 07:00  --------------------------------------------------------  IN: 50 mL / OUT: 530 mL / NET: -480 mL    01-20 @ 07:01 - 01-21 @ 07:00  --------------------------------------------------------  IN: 700 mL / OUT: 880 mL / NET: -180 mL    01-21 @ 07:01 - 01-21 @ 15:31  --------------------------------------------------------  IN: 313.9 mL / OUT: 335 mL / NET: -21.1 mL        PHYSICAL EXAM:  Constitutional: NAD  Neck: No JVD  Respiratory: decreased on left  Cardiovascular: S1, S2, RRR  Gastrointestinal: BS+, soft, NT/ND  Extremities: No cyanosis or clubbing. No peripheral edema  Neurological: A/O x 3  Psychiatric: Normal mood, normal affect  : + delong.   Skin: No rashes    LABS:  01-21    138  |  100  |  20  ----------------------------<  169<H>  3.2<L>   |  25  |  1.72<H>    Ca    8.3<L>      21 Jan 2024 04:05  Phos  2.9     01-21  Mg     2.00     01-21    TPro  6.7  /  Alb  2.6<L>  /  TBili  0.8  /  DBili      /  AST  21  /  ALT  17  /  AlkPhos  67  01-21                          8.4    17.58 )-----------( 274      ( 21 Jan 2024 04:05 )             26.1           RADIOLOGY & ADDITIONAL STUDIES:  INTERPRETATION:  EXAMINATION: XR CHEST URGENT    CLINICAL INDICATION: Desaturation    TECHNIQUE: Single frontal, portable view ofthe chest was obtained.    COMPARISON: Chest x-ray 1/19/2024.    FINDINGS:  Partial visualization of a left-sided cardiac device leads in right   atrium and right ventricle.  Sternotomy wires.  The heart is not accurately assessed in this AP projection.  Moderate-sized right pleural effusion, slightly worsened compared to   prior.  Trace left pleural effusion.  No acute bony abnormality.    IMPRESSION:  Moderate-sized right pleural effusion, slightly worsened compared to   prior.    --- End of Report ---     Nephrology Progress Note    Patient is a 89y male breathing stable on venti mask ,  mild distress    Allergies:  fluoroquinolone antibiotics (Other)  Cipro (Unknown)  Tegretol (Unknown)  carbamazepine (Other)    Hospital Medications:   MEDICATIONS  (STANDING):  albuterol/ipratropium for Nebulization 3 milliLiter(s) Nebulizer every 6 hours  amLODIPine   Tablet 5 milliGRAM(s) Oral daily  aspirin  chewable 81 milliGRAM(s) Oral daily  chlorhexidine 2% Cloths 1 Application(s) Topical daily  dexMEDEtomidine Infusion 0.1 MICROgram(s)/kG/Hr (1.98 mL/Hr) IV Continuous <Continuous>  escitalopram 10 milliGRAM(s) Oral daily  furosemide   Injectable 10 milliGRAM(s) IV Push daily  heparin   Injectable 5000 Unit(s) SubCutaneous every 8 hours  insulin glargine Injectable (LANTUS) 7 Unit(s) SubCutaneous at bedtime  insulin lispro (ADMELOG) corrective regimen sliding scale   SubCutaneous every 6 hours  levETIRAcetam  IVPB 250 milliGRAM(s) IV Intermittent every 12 hours  lidocaine   4% Patch 1 Patch Transdermal every 24 hours  metoprolol tartrate 25 milliGRAM(s) Oral two times a day  mupirocin 2% Ointment 1 Application(s) Topical two times a day  pantoprazole  Injectable 40 milliGRAM(s) IV Push every 12 hours  piperacillin/tazobactam IVPB.. 3.375 Gram(s) IV Intermittent every 8 hours  QUEtiapine 12.5 milliGRAM(s) Oral at bedtime  ranolazine 500 milliGRAM(s) Oral two times a day  sodium chloride 3%  Inhalation 4 milliLiter(s) Inhalation every 6 hours  sucralfate 1 Gram(s) Oral every 12 hours  ticagrelor 60 milliGRAM(s) Oral every 12 hours        VITALS:  T(F): 98 (01-21-24 @ 12:00), Max: 100.7 (01-20-24 @ 16:00)  HR: 90 (01-21-24 @ 14:40)  BP: 127/51 (01-21-24 @ 14:00)  RR: 23 (01-21-24 @ 14:40)  SpO2: 94% (01-21-24 @ 14:40)      01-19 @ 07:01  -  01-20 @ 07:00  --------------------------------------------------------  IN: 50 mL / OUT: 530 mL / NET: -480 mL    01-20 @ 07:01 - 01-21 @ 07:00  --------------------------------------------------------  IN: 700 mL / OUT: 880 mL / NET: -180 mL    01-21 @ 07:01 - 01-21 @ 15:31  --------------------------------------------------------  IN: 313.9 mL / OUT: 335 mL / NET: -21.1 mL        PHYSICAL EXAM:  Constitutional: NAD  Neck: No JVD  Respiratory: decreased on left  Cardiovascular: S1, S2, RRR  Gastrointestinal: BS+, soft, NT/ND  Extremities: No cyanosis or clubbing. No peripheral edema  Neurological: A/O x 3  Psychiatric: Normal mood, normal affect  : + delong.   Skin: No rashes    LABS:  01-21    138  |  100  |  20  ----------------------------<  169<H>  3.2<L>   |  25  |  1.72<H>    Ca    8.3<L>      21 Jan 2024 04:05  Phos  2.9     01-21  Mg     2.00     01-21    TPro  6.7  /  Alb  2.6<L>  /  TBili  0.8  /  DBili      /  AST  21  /  ALT  17  /  AlkPhos  67  01-21                          8.4    17.58 )-----------( 274      ( 21 Jan 2024 04:05 )             26.1           RADIOLOGY & ADDITIONAL STUDIES:  INTERPRETATION:  EXAMINATION: XR CHEST URGENT    CLINICAL INDICATION: Desaturation    TECHNIQUE: Single frontal, portable view ofthe chest was obtained.    COMPARISON: Chest x-ray 1/19/2024.    FINDINGS:  Partial visualization of a left-sided cardiac device leads in right   atrium and right ventricle.  Sternotomy wires.  The heart is not accurately assessed in this AP projection.  Moderate-sized right pleural effusion, slightly worsened compared to   prior.  Trace left pleural effusion.  No acute bony abnormality.    IMPRESSION:  Moderate-sized right pleural effusion, slightly worsened compared to   prior.    --- End of Report ---

## 2024-01-22 NOTE — PROCEDURE NOTE - NSBRONCHPROCDETAILS_GEN_A_CORE_FT
Bronchoscope inserted through ETT. ETT noted to be in good position. Airway evaluation revealed diffuse edematous mucosa with bilious secretions in the airway which was therapeutically aspirated. BAL of RML performed.  Bronchoscope then withdrawn from ETT. Bronchoscope inserted through ETT. ETT noted to be in good position. Airway evaluation revealed diffuse edematous mucosa with bilious secretions in the airway which was therapeutically aspirated. BAL of RML performed with 20 cc of NS.  Bronchoscope then withdrawn from ETT.

## 2024-01-22 NOTE — PROGRESS NOTE ADULT - SUBJECTIVE AND OBJECTIVE BOX
Date of Service: 01-22-24 @ 09:38    Patient is a 89y old  Male who presents with a chief complaint of COVID19, Chest pain (22 Jan 2024 07:01)      Any change in ROS:  son at bedside:  pt is doing poorly:  he is on 100% NRB as well as 1005 high flow     MEDICATIONS  (STANDING):  albuterol/ipratropium for Nebulization 3 milliLiter(s) Nebulizer every 6 hours  amLODIPine   Tablet 5 milliGRAM(s) Oral daily  aspirin  chewable 81 milliGRAM(s) Oral daily  chlorhexidine 2% Cloths 1 Application(s) Topical daily  dexMEDEtomidine Infusion 0.1 MICROgram(s)/kG/Hr (1.98 mL/Hr) IV Continuous <Continuous>  escitalopram 10 milliGRAM(s) Oral daily  furosemide   Injectable 10 milliGRAM(s) IV Push daily  heparin   Injectable 5000 Unit(s) SubCutaneous every 8 hours  insulin glargine Injectable (LANTUS) 7 Unit(s) SubCutaneous at bedtime  insulin lispro (ADMELOG) corrective regimen sliding scale   SubCutaneous every 6 hours  levETIRAcetam  IVPB 250 milliGRAM(s) IV Intermittent every 12 hours  lidocaine   4% Patch 1 Patch Transdermal every 24 hours  metoprolol tartrate 25 milliGRAM(s) Oral two times a day  mupirocin 2% Ointment 1 Application(s) Topical two times a day  pantoprazole  Injectable 40 milliGRAM(s) IV Push every 12 hours  piperacillin/tazobactam IVPB.. 3.375 Gram(s) IV Intermittent every 8 hours  QUEtiapine 12.5 milliGRAM(s) Oral at bedtime  ranolazine 500 milliGRAM(s) Oral two times a day  sodium chloride 3%  Inhalation 4 milliLiter(s) Inhalation every 6 hours  sucralfate 1 Gram(s) Oral every 12 hours  ticagrelor 60 milliGRAM(s) Oral every 12 hours    MEDICATIONS  (PRN):  acetaminophen     Tablet .. 650 milliGRAM(s) Oral every 6 hours PRN Temp greater or equal to 38C (100.4F), Mild Pain (1 - 3), Moderate Pain (4 - 6)  guaiFENesin Oral Liquid (Sugar-Free) 100 milliGRAM(s) Oral every 6 hours PRN Cough    Vital Signs Last 24 Hrs  T(C): 36.1 (22 Jan 2024 08:00), Max: 37 (21 Jan 2024 16:00)  T(F): 97 (22 Jan 2024 08:00), Max: 98.6 (21 Jan 2024 16:00)  HR: 77 (22 Jan 2024 09:00) (73 - 92)  BP: 132/55 (22 Jan 2024 09:00) (105/42 - 147/52)  BP(mean): 71 (22 Jan 2024 09:00) (55 - 96)  RR: 30 (22 Jan 2024 09:00) (14 - 36)  SpO2: 91% (22 Jan 2024 09:00) (90% - 100%)    Parameters below as of 22 Jan 2024 09:00  Patient On (Oxygen Delivery Method): nasal cannula, high flow  O2 Flow (L/min): 50  O2 Concentration (%): 100    I&O's Summary    21 Jan 2024 07:01  -  22 Jan 2024 07:00  --------------------------------------------------------  IN: 837.2 mL / OUT: 910 mL / NET: -72.8 mL    22 Jan 2024 07:01  -  22 Jan 2024 09:38  --------------------------------------------------------  IN: 56 mL / OUT: 115 mL / NET: -59 mL          Physical Exam:   GENERAL: NAD, well-groomed, well-developed  HEENT: JAVAD/   Atraumatic, Normocephalic  ENMT: No tonsillar erythema, exudates, or enlargement; Moist mucous membranes, Good dentition, No lesions  NECK: Supple, No JVD, Normal thyroid  CHEST/LUNG: no wheezing:  decreased air entry right side    CVS: Regular rate and rhythm; No murmurs, rubs, or gallops  GI: : Soft, Nontender, Nondistended; Bowel sounds present  NERVOUS SYSTEM:  letahrgic  : on 100%NRB and high flow:   EXTREMITIES: - edema  LYMPH: No lymphadenopathy noted  SKIN: No rashes or lesions  ENDOCRINOLOGY: No Thyromegaly  PSYCH: lethargic    Labs:  ABG - ( 22 Jan 2024 03:53 )  pH, Arterial: 7.38  pH, Blood: x     /  pCO2: 46    /  pO2: 77    / HCO3: 27    / Base Excess: 1.7   /  SaO2: 96.2            28                            8.7    21.18 )-----------( 298      ( 22 Jan 2024 03:53 )             26.6                         8.4    17.58 )-----------( 274      ( 21 Jan 2024 04:05 )             26.1                         8.7    16.20 )-----------( 271      ( 20 Jan 2024 01:00 )             27.3                         8.9    8.25  )-----------( 284      ( 19 Jan 2024 06:16 )             27.7     01-22    139  |  97<L>  |  24<H>  ----------------------------<  240<H>  3.4<L>   |  25  |  1.79<H>  01-21    138  |  99  |  23  ----------------------------<  223<H>  4.8   |  24  |  1.76<H>  01-21    138  |  100  |  20  ----------------------------<  169<H>  3.2<L>   |  25  |  1.72<H>  01-20    138  |  100  |  16  ----------------------------<  159<H>  4.1   |  26  |  1.63<H>  01-19    142  |  101  |  14  ----------------------------<  60<L>  3.9   |  29  |  1.54<H>    Ca    8.5      22 Jan 2024 03:53  Ca    8.3<L>      21 Jan 2024 20:41  Ca    8.3<L>      21 Jan 2024 04:05  Phos  3.7     01-22  Phos  3.8     01-21  Phos  2.9     01-21  Mg     1.90     01-22  Mg     2.00     01-21  Mg     2.00     01-21    TPro  7.1  /  Alb  2.6<L>  /  TBili  0.7  /  DBili  x   /  AST  25  /  ALT  22  /  AlkPhos  78  01-22  TPro  6.7  /  Alb  2.6<L>  /  TBili  0.8  /  DBili  x   /  AST  21  /  ALT  17  /  AlkPhos  67  01-21  TPro  6.3  /  Alb  2.4<L>  /  TBili  0.5  /  DBili  x   /  AST  17  /  ALT  14  /  AlkPhos  50  01-20    CAPILLARY BLOOD GLUCOSE      POCT Blood Glucose.: 265 mg/dL (22 Jan 2024 06:33)  POCT Blood Glucose.: 228 mg/dL (21 Jan 2024 22:55)  POCT Blood Glucose.: 209 mg/dL (21 Jan 2024 17:30)  POCT Blood Glucose.: 184 mg/dL (21 Jan 2024 11:08)      LIVER FUNCTIONS - ( 22 Jan 2024 03:53 )  Alb: 2.6 g/dL / Pro: 7.1 g/dL / ALK PHOS: 78 U/L / ALT: 22 U/L / AST: 25 U/L / GGT: x           PT/INR - ( 22 Jan 2024 03:53 )   PT: 14.5 sec;   INR: 1.30 ratio         PTT - ( 22 Jan 2024 03:53 )  PTT:37.4 sec  Urinalysis Basic - ( 22 Jan 2024 03:53 )    Color: x / Appearance: x / SG: x / pH: x  Gluc: 240 mg/dL / Ketone: x  / Bili: x / Urobili: x   Blood: x / Protein: x / Nitrite: x   Leuk Esterase: x / RBC: x / WBC x   Sq Epi: x / Non Sq Epi: x / Bacteria: x            RECENT CULTURES:  01-20 @ 13:59 .Blood Blood-Peripheral                No growth at 24 hours    01-20 @ 13:49 .Blood Blood-Peripheral                No growth at 24 hours    01-19 @ 11:35 .Bronchial R MIDDLE LOBE   ALDA    Moderate polymorphonuclear leukocytes per low power field  No squamous epithelial cells per low power field  Numerous Gram Negative Rods per oil power field  Few Gram Positive Cocci in Clusters per oil power field    Staphylococcus aureus  Staphylococcus aureus     Numerous Staphylococcus aureus  Normal Respiratory Aurelia present          RESPIRATORY CULTURES:        rad< from: Xray Chest 1 View- PORTABLE-Urgent (Xray Chest 1 View- PORTABLE-Urgent .) (01.21.24 @ 17:27) >    ******PRELIMINARY REPORT******      ******PRELIMINARY REPORT******         ACC: [id number] EXAM:  XR CHEST PORTABLE URGENT 1V   ORDERED BY: ROXANNE HOLMAN     PROCEDURE DATE:  01/21/2024    ******PRELIMINARY REPORT******      ******PRELIMINARY REPORT******           INTERPRETATION:  enteric tube not well visualized. recommend repeat x ray   to confirm location.        ******PRELIMINARY REPORT******      ******PRELIMINARY REPORT******       CAROLINA BETANCOURT DO; Resident Radiologist  This document is a PRELIMINARY interpretation and is pending final   attending approval. Jan 21 2024  8:20PM    < end of copied text >  < from: Xray Chest 1 View- PORTABLE-Urgent (Xray Chest 1 View- PORTABLE-Urgent .) (01.19.24 @ 12:49) >    ACC: [id number] EXAM:  XR CHEST PORTABLE URGENT 1V   ORDERED BY: KEYA JONES     PROCEDURE DATE:  01/19/2024          INTERPRETATION:  CLINICAL INFORMATION: Post bronchoscopy for airway   clearance    TIME OF EXAMINATION: January 19 at 12:30 PM    EXAM: Portable chest    FINDINGS:  Hazy lung bases right greater than left secondary to effusions with   underlying atelectasis.  Visualized lungs are clear.  Sternotomy wires and pacemaker are in place.  No pneumothorax or pneumomediastinum.        COMPARISON: January 9, 2024        IMPRESSION: Status post bronchoscopy without complicating   pneumomediastinum or pneumothorax.    --- End of Report ---            AMELIA ANGLIN MD; Attending Radiologist  This document has been electronically signed. Jan 19 2024  1:19PM    < end of copied text >    Studies  Chest X-RAY  CT SCAN Chest   Venous Dopplers: LE:   CT Abdomen  Others

## 2024-01-22 NOTE — PROGRESS NOTE ADULT - ASSESSMENT
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     PLAN  =====Neurologic=====  #CVA  - prior CVA x3 with no residual deficits  #myoclonic jerks  - on Keppra will continue  - holding home lexapro gabapentin and memantine given persistent secretions/ cough   - can restart once enteric access is established    =====Pulmonary=====  #COVID  - s/p paxlovid and 1 dose remdesivir while inpatient  #Pleural effusions b/l  - CT chest from 1/16 showing new small bilateral pleural effusions with associated complete collapse  of the right middle and right lower lobes and partial atelectasis of the  right upper and left lower lobes. patchy bilateral ground-glass opacities are consistent with pulmonary edema. layering secretions in the trachea, left mainstem bronchus, and right lower lobe bronchus  - currently on cefepime and azithro for empiric PNA coverage will switch to zosyn and dc azithro   - s/p bronch for increasing secretions- on Mucomyst, hypertonic saline, albuterol, guaifenesin, mometasone will continue   - currently on high-flow 60% 50L, will wean,  fu ABGs worsening hypercapnia possible intubation pending GOC    =====Cardiovascular=====  Patient does not currently require vasopressors and is normotensive  #HTN  - on home amlodipine and metoprolol, will continue here when able to tolerate PO    #CAD  - on Brilinta aspirin and ranolazine continue when able to tolerate PO     =====GI=====  #upper GI bleed  - s/p EGD showing dieulafoy lesion and esophagitis likely 2/2 NGT irritation s/p 2 clips  - on protonix IV BID continue     #Diet  - tube feeds once NGT placed    =====Renal/=====  #Electrolytes  - Maintain K>4, Phos>3, Mag>2, iCal>1  - baseline cr 1.5, at baseline now      =====Endocrine=====  #DM2  - on lantus 14units and SSI  - a1c 9.3, glucose wnl inpatient     =====Infectious Disease=====  #COVID   - s/p paxlovid and 1 dose remdesivir  # PNA  - CT chest showing ground glass opacities  - treatment with azithro and cefepime currently- switch to zosyn on 1/20 for aspiration coverage, dc azithro   - f/u urine legionella  - no white count or fever thus far  - f/u bronchial cultures   - f/u blood cultures given fever overnight on 1/20    =====Heme/Onc=====  #DVT Ppx  - Lovenox 40mg SQ qd    =====Ethics=====  FULL CODE. 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     PLAN  =====Neurologic=====  #CVA  - prior CVA x3 with no residual deficits  #myoclonic jerks  - on Keppra will continue  - holding home lexapro gabapentin and memantine given persistent secretions/ cough   - can restart once enteric access is established  - low dose Seroquel started 1/21     =====Pulmonary=====  #COVID  - s/p paxlovid and 1 dose remdesivir while inpatient  #Pleural effusions b/l  - CT chest from 1/16 showing new small bilateral pleural effusions with associated complete collapse  of the right middle and right lower lobes and partial atelectasis of the  right upper and left lower lobes. patchy bilateral ground-glass opacities are consistent with pulmonary edema. layering secretions in the trachea, left mainstem bronchus, and right lower lobe bronchus  - currently on cefepime and azithro for empiric PNA coverage will switch to zosyn and dc azithro   - s/p bronch for increasing secretions- on Mucomyst, hypertonic saline, albuterol, guaifenesin, mometasone will continue   - bronchial cultures with staph aureus sensitive to bactrim/ Unasyn consider switching?   - currently on high-flow 60% 50L, will wean,  fu ABGs worsening hypercapnia possible intubation pending GOC    =====Cardiovascular=====  Patient does not currently require vasopressors and is normotensive  #HTN  - on home amlodipine and metoprolol, will continue    #CAD  - on Brilinta aspirin and ranolazine continue     =====GI=====  #upper GI bleed  - s/p EGD showing dieulafoy lesion and esophagitis likely 2/2 NGT irritation s/p 2 clips  - on protonix IV BID continue     #Diet  - tube feeds NGT     =====Renal/=====  #Electrolytes  - Maintain K>4, Phos>3, Mag>2, iCal>1  - baseline cr 1.5, at baseline      =====Endocrine=====  #DM2  - on lantus 14units and SSI  - a1c 9.3, glucose wnl inpatient     =====Infectious Disease=====  #COVID   - s/p paxlovid and 1 dose remdesivir  # PNA  - CT chest showing ground glass opacities  - treatment with azithro and cefepime currently- switch to zosyn on 1/20 for aspiration coverage, dc azithro   - f/u urine legionella  - no white count or fever thus far  - f/u bronchial cultures - staph aureus   - f/u blood cultures given fever overnight on 1/20- NGTD     =====Heme/Onc=====  #DVT Ppx  - Lovenox 40mg SQ qd    =====Ethics=====  FULL CODE.

## 2024-01-23 LAB
ALBUMIN SERPL ELPH-MCNC: 2.3 G/DL — LOW (ref 3.3–5)
ALBUMIN SERPL ELPH-MCNC: 2.7 G/DL — LOW (ref 3.3–5)
ALP SERPL-CCNC: 103 U/L — SIGNIFICANT CHANGE UP (ref 40–120)
ALP SERPL-CCNC: 83 U/L — SIGNIFICANT CHANGE UP (ref 40–120)
ALT FLD-CCNC: 17 U/L — SIGNIFICANT CHANGE UP (ref 4–41)
ALT FLD-CCNC: 21 U/L — SIGNIFICANT CHANGE UP (ref 4–41)
ANION GAP SERPL CALC-SCNC: 11 MMOL/L — SIGNIFICANT CHANGE UP (ref 7–14)
ANION GAP SERPL CALC-SCNC: 17 MMOL/L — HIGH (ref 7–14)
APTT BLD: 28.3 SEC — SIGNIFICANT CHANGE UP (ref 24.5–35.6)
AST SERPL-CCNC: 17 U/L — SIGNIFICANT CHANGE UP (ref 4–40)
AST SERPL-CCNC: 17 U/L — SIGNIFICANT CHANGE UP (ref 4–40)
BILIRUB SERPL-MCNC: 0.4 MG/DL — SIGNIFICANT CHANGE UP (ref 0.2–1.2)
BILIRUB SERPL-MCNC: 0.5 MG/DL — SIGNIFICANT CHANGE UP (ref 0.2–1.2)
BUN SERPL-MCNC: 26 MG/DL — HIGH (ref 7–23)
BUN SERPL-MCNC: 28 MG/DL — HIGH (ref 7–23)
CALCIUM SERPL-MCNC: 8.1 MG/DL — LOW (ref 8.4–10.5)
CALCIUM SERPL-MCNC: 8.4 MG/DL — SIGNIFICANT CHANGE UP (ref 8.4–10.5)
CHLORIDE SERPL-SCNC: 101 MMOL/L — SIGNIFICANT CHANGE UP (ref 98–107)
CHLORIDE SERPL-SCNC: 97 MMOL/L — LOW (ref 98–107)
CO2 SERPL-SCNC: 25 MMOL/L — SIGNIFICANT CHANGE UP (ref 22–31)
CO2 SERPL-SCNC: 29 MMOL/L — SIGNIFICANT CHANGE UP (ref 22–31)
CREAT SERPL-MCNC: 1.75 MG/DL — HIGH (ref 0.5–1.3)
CREAT SERPL-MCNC: 1.79 MG/DL — HIGH (ref 0.5–1.3)
EGFR: 36 ML/MIN/1.73M2 — LOW
EGFR: 37 ML/MIN/1.73M2 — LOW
GAS PNL BLDA: SIGNIFICANT CHANGE UP
GLUCOSE BLDC GLUCOMTR-MCNC: 332 MG/DL — HIGH (ref 70–99)
GLUCOSE BLDC GLUCOMTR-MCNC: 396 MG/DL — HIGH (ref 70–99)
GLUCOSE BLDC GLUCOMTR-MCNC: 397 MG/DL — HIGH (ref 70–99)
GLUCOSE BLDC GLUCOMTR-MCNC: 460 MG/DL — CRITICAL HIGH (ref 70–99)
GLUCOSE SERPL-MCNC: 329 MG/DL — HIGH (ref 70–99)
GLUCOSE SERPL-MCNC: 427 MG/DL — HIGH (ref 70–99)
GRAM STN FLD: SIGNIFICANT CHANGE UP
HCT VFR BLD CALC: 27.4 % — LOW (ref 39–50)
HGB BLD-MCNC: 8.8 G/DL — LOW (ref 13–17)
INR BLD: 1.23 RATIO — HIGH (ref 0.85–1.18)
MAGNESIUM SERPL-MCNC: 2 MG/DL — SIGNIFICANT CHANGE UP (ref 1.6–2.6)
MAGNESIUM SERPL-MCNC: 2 MG/DL — SIGNIFICANT CHANGE UP (ref 1.6–2.6)
MCHC RBC-ENTMCNC: 29.8 PG — SIGNIFICANT CHANGE UP (ref 27–34)
MCHC RBC-ENTMCNC: 32.1 GM/DL — SIGNIFICANT CHANGE UP (ref 32–36)
MCV RBC AUTO: 92.9 FL — SIGNIFICANT CHANGE UP (ref 80–100)
NON-GYNECOLOGICAL CYTOLOGY STUDY: SIGNIFICANT CHANGE UP
NRBC # BLD: 0 /100 WBCS — SIGNIFICANT CHANGE UP (ref 0–0)
NRBC # FLD: 0 K/UL — SIGNIFICANT CHANGE UP (ref 0–0)
PHOSPHATE SERPL-MCNC: 1.8 MG/DL — LOW (ref 2.5–4.5)
PHOSPHATE SERPL-MCNC: 1.9 MG/DL — LOW (ref 2.5–4.5)
PLATELET # BLD AUTO: 332 K/UL — SIGNIFICANT CHANGE UP (ref 150–400)
POTASSIUM SERPL-MCNC: 3 MMOL/L — LOW (ref 3.5–5.3)
POTASSIUM SERPL-MCNC: 3.1 MMOL/L — LOW (ref 3.5–5.3)
POTASSIUM SERPL-SCNC: 3 MMOL/L — LOW (ref 3.5–5.3)
POTASSIUM SERPL-SCNC: 3.1 MMOL/L — LOW (ref 3.5–5.3)
PROT SERPL-MCNC: 6.6 G/DL — SIGNIFICANT CHANGE UP (ref 6–8.3)
PROT SERPL-MCNC: 7.1 G/DL — SIGNIFICANT CHANGE UP (ref 6–8.3)
PROTHROM AB SERPL-ACNC: 13.7 SEC — HIGH (ref 9.5–13)
RBC # BLD: 2.95 M/UL — LOW (ref 4.2–5.8)
RBC # FLD: 16.3 % — HIGH (ref 10.3–14.5)
SODIUM SERPL-SCNC: 139 MMOL/L — SIGNIFICANT CHANGE UP (ref 135–145)
SODIUM SERPL-SCNC: 141 MMOL/L — SIGNIFICANT CHANGE UP (ref 135–145)
SPECIMEN SOURCE: SIGNIFICANT CHANGE UP
WBC # BLD: 21.23 K/UL — HIGH (ref 3.8–10.5)
WBC # FLD AUTO: 21.23 K/UL — HIGH (ref 3.8–10.5)

## 2024-01-23 PROCEDURE — 93308 TTE F-UP OR LMTD: CPT | Mod: 26,GC

## 2024-01-23 PROCEDURE — 99291 CRITICAL CARE FIRST HOUR: CPT

## 2024-01-23 PROCEDURE — 76604 US EXAM CHEST: CPT | Mod: 26,GC

## 2024-01-23 PROCEDURE — 93321 DOPPLER ECHO F-UP/LMTD STD: CPT | Mod: 26,GC

## 2024-01-23 RX ORDER — HUMAN INSULIN 100 [IU]/ML
6 INJECTION, SUSPENSION SUBCUTANEOUS EVERY 6 HOURS
Refills: 0 | Status: DISCONTINUED | OUTPATIENT
Start: 2024-01-23 | End: 2024-01-23

## 2024-01-23 RX ORDER — GLUCAGON INJECTION, SOLUTION 0.5 MG/.1ML
1 INJECTION, SOLUTION SUBCUTANEOUS ONCE
Refills: 0 | Status: DISCONTINUED | OUTPATIENT
Start: 2024-01-23 | End: 2024-01-26

## 2024-01-23 RX ORDER — NOREPINEPHRINE BITARTRATE/D5W 8 MG/250ML
0.05 PLASTIC BAG, INJECTION (ML) INTRAVENOUS
Qty: 8 | Refills: 0 | Status: DISCONTINUED | OUTPATIENT
Start: 2024-01-23 | End: 2024-01-24

## 2024-01-23 RX ORDER — SODIUM CHLORIDE 9 MG/ML
1000 INJECTION, SOLUTION INTRAVENOUS
Refills: 0 | Status: DISCONTINUED | OUTPATIENT
Start: 2024-01-23 | End: 2024-01-26

## 2024-01-23 RX ORDER — SUCRALFATE 1 G
1 TABLET ORAL
Refills: 0 | Status: DISCONTINUED | OUTPATIENT
Start: 2024-01-23 | End: 2024-01-24

## 2024-01-23 RX ORDER — INSULIN GLARGINE 100 [IU]/ML
11 INJECTION, SOLUTION SUBCUTANEOUS AT BEDTIME
Refills: 0 | Status: DISCONTINUED | OUTPATIENT
Start: 2024-01-23 | End: 2024-01-23

## 2024-01-23 RX ORDER — POTASSIUM CHLORIDE 20 MEQ
40 PACKET (EA) ORAL ONCE
Refills: 0 | Status: COMPLETED | OUTPATIENT
Start: 2024-01-23 | End: 2024-01-23

## 2024-01-23 RX ORDER — DEXTROSE 50 % IN WATER 50 %
12.5 SYRINGE (ML) INTRAVENOUS ONCE
Refills: 0 | Status: DISCONTINUED | OUTPATIENT
Start: 2024-01-23 | End: 2024-01-26

## 2024-01-23 RX ORDER — DEXTROSE 50 % IN WATER 50 %
25 SYRINGE (ML) INTRAVENOUS ONCE
Refills: 0 | Status: DISCONTINUED | OUTPATIENT
Start: 2024-01-23 | End: 2024-01-26

## 2024-01-23 RX ORDER — HUMAN INSULIN 100 [IU]/ML
7 INJECTION, SUSPENSION SUBCUTANEOUS EVERY 6 HOURS
Refills: 0 | Status: DISCONTINUED | OUTPATIENT
Start: 2024-01-23 | End: 2024-01-23

## 2024-01-23 RX ORDER — SODIUM,POTASSIUM PHOSPHATES 278-250MG
1 POWDER IN PACKET (EA) ORAL ONCE
Refills: 0 | Status: COMPLETED | OUTPATIENT
Start: 2024-01-23 | End: 2024-01-23

## 2024-01-23 RX ORDER — HUMAN INSULIN 100 [IU]/ML
16 INJECTION, SUSPENSION SUBCUTANEOUS EVERY 6 HOURS
Refills: 0 | Status: DISCONTINUED | OUTPATIENT
Start: 2024-01-23 | End: 2024-01-26

## 2024-01-23 RX ORDER — POTASSIUM CHLORIDE 20 MEQ
40 PACKET (EA) ORAL EVERY 4 HOURS
Refills: 0 | Status: COMPLETED | OUTPATIENT
Start: 2024-01-23 | End: 2024-01-24

## 2024-01-23 RX ORDER — DEXTROSE 50 % IN WATER 50 %
15 SYRINGE (ML) INTRAVENOUS ONCE
Refills: 0 | Status: DISCONTINUED | OUTPATIENT
Start: 2024-01-23 | End: 2024-01-26

## 2024-01-23 RX ORDER — FUROSEMIDE 40 MG
40 TABLET ORAL ONCE
Refills: 0 | Status: COMPLETED | OUTPATIENT
Start: 2024-01-23 | End: 2024-01-23

## 2024-01-23 RX ADMIN — SENNA PLUS 2 TABLET(S): 8.6 TABLET ORAL at 21:25

## 2024-01-23 RX ADMIN — LEVETIRACETAM 400 MILLIGRAM(S): 250 TABLET, FILM COATED ORAL at 18:15

## 2024-01-23 RX ADMIN — SODIUM CHLORIDE 4 MILLILITER(S): 9 INJECTION INTRAMUSCULAR; INTRAVENOUS; SUBCUTANEOUS at 04:26

## 2024-01-23 RX ADMIN — RANOLAZINE 500 MILLIGRAM(S): 500 TABLET, FILM COATED, EXTENDED RELEASE ORAL at 05:35

## 2024-01-23 RX ADMIN — HUMAN INSULIN 7 UNIT(S): 100 INJECTION, SUSPENSION SUBCUTANEOUS at 18:11

## 2024-01-23 RX ADMIN — POLYETHYLENE GLYCOL 3350 17 GRAM(S): 17 POWDER, FOR SOLUTION ORAL at 12:03

## 2024-01-23 RX ADMIN — Medication 3 MILLILITER(S): at 04:25

## 2024-01-23 RX ADMIN — Medication 3 MILLILITER(S): at 09:24

## 2024-01-23 RX ADMIN — HEPARIN SODIUM 5000 UNIT(S): 5000 INJECTION INTRAVENOUS; SUBCUTANEOUS at 21:24

## 2024-01-23 RX ADMIN — ESCITALOPRAM OXALATE 10 MILLIGRAM(S): 10 TABLET, FILM COATED ORAL at 12:03

## 2024-01-23 RX ADMIN — Medication 8: at 05:35

## 2024-01-23 RX ADMIN — Medication 1 GRAM(S): at 18:15

## 2024-01-23 RX ADMIN — Medication 40 MILLIEQUIVALENT(S): at 21:24

## 2024-01-23 RX ADMIN — Medication 1 PACKET(S): at 02:06

## 2024-01-23 RX ADMIN — PANTOPRAZOLE SODIUM 40 MILLIGRAM(S): 20 TABLET, DELAYED RELEASE ORAL at 05:35

## 2024-01-23 RX ADMIN — LIDOCAINE 1 PATCH: 4 CREAM TOPICAL at 21:25

## 2024-01-23 RX ADMIN — SODIUM CHLORIDE 4 MILLILITER(S): 9 INJECTION INTRAMUSCULAR; INTRAVENOUS; SUBCUTANEOUS at 09:24

## 2024-01-23 RX ADMIN — PROPOFOL 19 MICROGRAM(S)/KG/MIN: 10 INJECTION, EMULSION INTRAVENOUS at 09:47

## 2024-01-23 RX ADMIN — Medication 12: at 18:12

## 2024-01-23 RX ADMIN — Medication 650 MILLIGRAM(S): at 21:25

## 2024-01-23 RX ADMIN — AMLODIPINE BESYLATE 5 MILLIGRAM(S): 2.5 TABLET ORAL at 05:34

## 2024-01-23 RX ADMIN — LEVETIRACETAM 400 MILLIGRAM(S): 250 TABLET, FILM COATED ORAL at 05:33

## 2024-01-23 RX ADMIN — Medication 40 MILLIGRAM(S): at 10:58

## 2024-01-23 RX ADMIN — Medication 10: at 12:03

## 2024-01-23 RX ADMIN — Medication 81 MILLIGRAM(S): at 12:03

## 2024-01-23 RX ADMIN — CHLORHEXIDINE GLUCONATE 15 MILLILITER(S): 213 SOLUTION TOPICAL at 18:14

## 2024-01-23 RX ADMIN — Medication 3 MILLILITER(S): at 15:04

## 2024-01-23 RX ADMIN — MUPIROCIN 1 APPLICATION(S): 20 OINTMENT TOPICAL at 18:16

## 2024-01-23 RX ADMIN — HEPARIN SODIUM 5000 UNIT(S): 5000 INJECTION INTRAVENOUS; SUBCUTANEOUS at 14:55

## 2024-01-23 RX ADMIN — CHLORHEXIDINE GLUCONATE 15 MILLILITER(S): 213 SOLUTION TOPICAL at 05:34

## 2024-01-23 RX ADMIN — TICAGRELOR 60 MILLIGRAM(S): 90 TABLET ORAL at 05:34

## 2024-01-23 RX ADMIN — TICAGRELOR 60 MILLIGRAM(S): 90 TABLET ORAL at 18:13

## 2024-01-23 RX ADMIN — CHLORHEXIDINE GLUCONATE 1 APPLICATION(S): 213 SOLUTION TOPICAL at 18:11

## 2024-01-23 RX ADMIN — PANTOPRAZOLE SODIUM 40 MILLIGRAM(S): 20 TABLET, DELAYED RELEASE ORAL at 18:13

## 2024-01-23 RX ADMIN — Medication 1 GRAM(S): at 05:35

## 2024-01-23 RX ADMIN — SODIUM CHLORIDE 4 MILLILITER(S): 9 INJECTION INTRAMUSCULAR; INTRAVENOUS; SUBCUTANEOUS at 21:53

## 2024-01-23 RX ADMIN — HEPARIN SODIUM 5000 UNIT(S): 5000 INJECTION INTRAVENOUS; SUBCUTANEOUS at 05:35

## 2024-01-23 RX ADMIN — Medication 650 MILLIGRAM(S): at 22:20

## 2024-01-23 RX ADMIN — MUPIROCIN 1 APPLICATION(S): 20 OINTMENT TOPICAL at 05:36

## 2024-01-23 RX ADMIN — PIPERACILLIN AND TAZOBACTAM 25 GRAM(S): 4; .5 INJECTION, POWDER, LYOPHILIZED, FOR SOLUTION INTRAVENOUS at 05:34

## 2024-01-23 RX ADMIN — Medication 40 MILLIGRAM(S): at 05:35

## 2024-01-23 RX ADMIN — Medication 3 MILLILITER(S): at 21:49

## 2024-01-23 RX ADMIN — RANOLAZINE 500 MILLIGRAM(S): 500 TABLET, FILM COATED, EXTENDED RELEASE ORAL at 18:13

## 2024-01-23 RX ADMIN — PIPERACILLIN AND TAZOBACTAM 25 GRAM(S): 4; .5 INJECTION, POWDER, LYOPHILIZED, FOR SOLUTION INTRAVENOUS at 14:55

## 2024-01-23 RX ADMIN — Medication 25 MILLIGRAM(S): at 05:35

## 2024-01-23 RX ADMIN — HUMAN INSULIN 7 UNIT(S): 100 INJECTION, SUSPENSION SUBCUTANEOUS at 12:06

## 2024-01-23 RX ADMIN — Medication 1 PACKET(S): at 19:40

## 2024-01-23 RX ADMIN — PIPERACILLIN AND TAZOBACTAM 25 GRAM(S): 4; .5 INJECTION, POWDER, LYOPHILIZED, FOR SOLUTION INTRAVENOUS at 21:24

## 2024-01-23 RX ADMIN — Medication 7.43 MICROGRAM(S)/KG/MIN: at 09:47

## 2024-01-23 RX ADMIN — Medication 40 MILLIEQUIVALENT(S): at 02:06

## 2024-01-23 RX ADMIN — SODIUM CHLORIDE 4 MILLILITER(S): 9 INJECTION INTRAMUSCULAR; INTRAVENOUS; SUBCUTANEOUS at 15:04

## 2024-01-23 NOTE — PROGRESS NOTE ADULT - SUBJECTIVE AND OBJECTIVE BOX
Aashish Boyer MD  Interventional Cardiology / Advance Heart Failure and Cardiac Transplant Specialist  [place] Office : [phone] [place] [address] [place] [address]  Tel:   [place] Office : [phone] [facility] [place] [address]  Tel: [phone]       Pt is lying in bed intubated on pressors  	  MEDICATIONS:  amLODIPine   Tablet 5 milliGRAM(s) Oral daily  aspirin  chewable 81 milliGRAM(s) Oral daily  heparin   Injectable 5000 Unit(s) SubCutaneous every 8 hours  metoprolol tartrate 25 milliGRAM(s) Oral two times a day  norepinephrine Infusion 0.05 MICROgram(s)/kG/Min IV Continuous <Continuous>  ranolazine 500 milliGRAM(s) Oral two times a day  ticagrelor 60 milliGRAM(s) Oral every 12 hours    piperacillin/tazobactam IVPB.. 3.375 Gram(s) IV Intermittent every 8 hours    albuterol/ipratropium for Nebulization 3 milliLiter(s) Nebulizer every 6 hours  guaiFENesin Oral Liquid (Sugar-Free) 100 milliGRAM(s) Oral every 6 hours PRN  sodium chloride 3%  Inhalation 4 milliLiter(s) Inhalation every 6 hours    acetaminophen     Tablet .. 650 milliGRAM(s) Oral every 6 hours PRN  dexMEDEtomidine Infusion 0.1 MICROgram(s)/kG/Hr IV Continuous <Continuous>  escitalopram 10 milliGRAM(s) Oral daily  levETIRAcetam  IVPB 250 milliGRAM(s) IV Intermittent every 12 hours  propofol Infusion 40 MICROgram(s)/kG/Min IV Continuous <Continuous>    pantoprazole  Injectable 40 milliGRAM(s) IV Push every 12 hours  polyethylene glycol 3350 17 Gram(s) Oral daily  senna 2 Tablet(s) Oral at bedtime  sucralfate 1 Gram(s) Oral every 12 hours    dextrose 50% Injectable 12.5 Gram(s) IV Push once  dextrose 50% Injectable 25 Gram(s) IV Push once  dextrose 50% Injectable 25 Gram(s) IV Push once  dextrose Oral Gel 15 Gram(s) Oral once PRN  glucagon  Injectable 1 milliGRAM(s) IntraMuscular once  insulin lispro (ADMELOG) corrective regimen sliding scale   SubCutaneous every 6 hours  insulin NPH human recombinant 7 Unit(s) SubCutaneous every 6 hours  methylPREDNISolone sodium succinate Injectable 40 milliGRAM(s) IV Push daily    chlorhexidine 0.12% Liquid 15 milliLiter(s) Oral Mucosa every 12 hours  chlorhexidine 2% Cloths 1 Application(s) Topical daily  dextrose 5%. 1000 milliLiter(s) IV Continuous <Continuous>  dextrose 5%. 1000 milliLiter(s) IV Continuous <Continuous>  lidocaine   4% Patch 1 Patch Transdermal every 24 hours  mupirocin 2% Ointment 1 Application(s) Topical two times a day      PAST MEDICAL/SURGICAL HISTORY  PAST MEDICAL & SURGICAL HISTORY:  Hyperlipemia      Hypertension      Coronary Artery Disease      Diabetes Mellitus Type II      Stented Coronary Artery  total 5 stents, last stent 5/2019      Neuropathy      Myocardial infarction      Stroke  mild left facial numbness   no other residuals verbalized      Myoclonic jerking      Stage 3 chronic kidney disease      History of Cataract Extraction      Hx of CABG      H/O coronary angiogram      S/P coronary artery stent placement  1/6/09      S/P placement of cardiac pacemaker          SOCIAL HISTORY: Substance Use (street drugs): ( x ) never used  (  ) other:    FAMILY HISTORY:  No pertinent family history in first degree relatives         PHYSICAL EXAM:  T(C): 37 (01-23-24 @ 12:00), Max: 37.3 (01-22-24 @ 16:00)  HR: 70 (01-23-24 @ 14:00) (65 - 91)  BP: 129/46 (01-23-24 @ 14:00) (121/36 - 157/51)  RR: 21 (01-23-24 @ 14:00) (20 - 22)  SpO2: 97% (01-23-24 @ 14:00) (94% - 100%)  Wt(kg): --  I&O's Summary    22 Jan 2024 07:01  -  23 Jan 2024 07:00  --------------------------------------------------------  IN: 1375.1 mL / OUT: 2150 mL / NET: -774.9 mL    23 Jan 2024 07:01  -  23 Jan 2024 15:18  --------------------------------------------------------  IN: 423.8 mL / OUT: 525 mL / NET: -101.2 mL          GENERAL: intubated  EYES:   PERRLA   ENMT:   Moist mucous membranes, Good dentition, No lesions  Cardiovascular: Normal S1 S2, No JVD, No murmurs, No edema  Respiratory: b/l rhonchi   Gastrointestinal:  Soft, Non-tender, + BS	  Extremities: no edema                                    8.8    21.23 )-----------( 332      ( 23 Jan 2024 00:40 )             27.4     01-23    139  |  97<L>  |  26<H>  ----------------------------<  329<H>  3.0<L>   |  25  |  1.79<H>    Ca    8.4      23 Jan 2024 00:40  Phos  1.9     01-23  Mg     2.00     01-23    TPro  7.1  /  Alb  2.7<L>  /  TBili  0.5  /  DBili  x   /  AST  17  /  ALT  21  /  AlkPhos  83  01-23    proBNP:   Lipid Profile:   HgA1c:   TSH:     Consultant(s) Notes Reviewed:  [x ] YES  [ ] NO    Care Discussed with Consultants/Other Providers [ x] YES  [ ] NO    Imaging Personally Reviewed independently:  [x] YES  [ ] NO    All labs, radiologic studies, vitals, orders and medications list reviewed. Patient is seen and examined at bedside. Case discussed with medical team.

## 2024-01-23 NOTE — PROGRESS NOTE ADULT - ASSESSMENT
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     EKG SR RBBB (old per family)     1) Hypoxia   - s/p bronch   - transferred to MICU now intubation  - Euvolemic on exma , Rt pleural effusion     2) CAD s/p CABG  -p/w chest pain. EKG non ischemic , trop -sera   - echo with normal lv and moderate AS   - c/w metoprolol   - chest pains  Trop mildly elevated and trending down likley sec to kidney disease,       3) Covid +  - s/p remdesivir   - c/w supportive management   - t/t per primary team     4) Abd distension   -CTA AP obtained which showed  partially occlusive calcified and non-calcified plaque just distal to take-off of the SMA, with estimated at least 75% luminal narrowing. Limited evaluation of its distal branches. 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   -on Protonix IV q 12

## 2024-01-23 NOTE — ADVANCED PRACTICE NURSE CONSULT - ASSESSMENT
General: A&Ox0, intubated with ETT - peritubular skin intact, and sedated. Patient currently on precedex, levophed, and propofol. Able to be turned with 2x assistance, incontinent of urine and stool. Indwelling delong catheter in place, yellow urine in drainage bag. Skin warm, dry with increased moisture in intertriginous folds, scattered areas of hyperpigmentation and hypopigmentation, scattered areas of ecchymosis without hematoma on bilateral upper extremities. Right nare with NGT, peritubular skin intact.     Vascular of b/l lower extremities: Bilateral lower extremities with scattered areas of hyper and hypopigmentation. Dry, flaky skin. Thickened-yellow hyperpigmented overgrown toenails. No temperature changes noted. +2 Edema. Capillary refill less than 3 seconds. +2 DP/PT pulses. Blanchable erythema noted to b/l heels.     Sacrum to b/l buttocks: Moisture associated dermatitis c/b incontinence as evidenced by blanchable erythema and linear fissure within sacral/gluteal cleft. Impaired skin with irregular borders, exposing pink-moist dermis. Does not overly burt prominences.  General: A&Ox0, intubated with ETT - peritubular skin intact, and sedated. Patient currently on precedex, levophed, and propofol. Able to be turned with 2x assistance, incontinent of urine and stool.  Skin warm, dry with increased moisture in intertriginous folds, scattered areas of hyperpigmentation and hypopigmentation, scattered areas of ecchymosis without hematoma on bilateral upper extremities. Right nare with NGT, peritubular skin intact.     Vascular of b/l lower extremities: Bilateral lower extremities with scattered areas of hyper and hypopigmentation. Dry, flaky skin. Thickened-yellow hyperpigmented overgrown toenails. No temperature changes noted. +2 Edema. Capillary refill less than 3 seconds. +2 DP/PT pulses. Blanchable erythema noted to b/l heels. Left trochanter with hyperpigmentation noted.     B/l buttocks: Moisture associated dermatitis c/b incontinence as evidenced by hyperpigmentation, hypopigmentations, and denudations. Denudations to b/l buttocks creating "kissing" effect. Impaired skin with irregular borders, exposing red-moist dermis. Does not overly burt prominences. No increased warmth, no drainage, no erythema, no induration, no edema, no overt signs of infection noted at this time. Goals of care: monitor for tissue type changes and  continue measures to decrease friction/shear/pressure.

## 2024-01-23 NOTE — PROGRESS NOTE ADULT - ASSESSMENT
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 Patient states that he has been having a dry cough for the past week, worsened over the past few days. Denies SOB with the cough, denies hemoptysis. Reports fevers at home to 101.5 with associated diaphoresis. Said that he has significant pinprick like b/l chest pain with his cough but denies any chest pain when not coughing or with ambulation. Also reports rhinorrhea and sore throat. Reports generalized weakness and poor appetite. States his daughter was visiting him over the week end last week and she was positive for COVID19. Patient tested positive for COVID19 2 days prior and was started on paxlovid as outpatient. Of note, family states that the patient has had worsening confusion at home over the past 6 months (becoming disoriented to time) but recently has been more confused, talking about seeing people that are not present.   On arrival to the ED his vitals were T 98 -> 99.2, P 80, /72, RR 18, ) sat 100% RA. His lab work showed leukocytosis with neutrophilia and bandemia, MABLE, mild hyponatremia, indeterminant but stable troponins, and elevated lactate to 2.3. His COVID19 swab was positive. His CXR showed bibasilar crackles.  (23 Dec 2023 22:51):  pt has been here for past two weeks and now pulm called fro excessive secretions   he is able to answer simple questions:  grand sons at bedside:     Covid / Resp failure/on 2 L of oxygen  :  CADS/P CABG  DM  CKD  HTN  CVA X 3    Covid / Resp failure/on 2 L of oxygen:  -he was treated for covid before:   -he has excessive secretions  -keep o2 sao2 above 90%  -add BD and mucomyst: ;  -add mucinex:   1/10: he seems to be doing  ok: cont mucomyst and bd   1/11 ?: add benzonatate and Robitussin prm : dc dornase  1/12: seems OK: no sob:  no cough : no phlegm : has gurgling sound in throat:  looks comfortable  1/14: he is on room air:  with good sao2:  finished covid rx:  cont current rx:  high risk for aspiration as he has gurgling secretions in throat   1/15: would do ct chest he seems to have increasing effusions:  his ct scan from last week of december:  has not much of effusions: however will try lasix : [facility]    1/16: doing  ok : no os:b has secretions still : change to percussion bed:  cont bd  1/17: seems stable:  no sob: on 3 L of oxygen : should add ATC lasix to me as he has formed new pleural effusions:  echo with mod AS   1/18; [facility] pts son : who is a neurologist:  he has secretions in chest with atelectasis and yovani effusions with increased secretions:  the son requests bronchoscopy:  I have explained the advantages and disadvantages of the bronch at this age and he might not be able to be extubated soon after procedure:  but they want to go ahead with it: IP called   He will remain art risk for recurrent aspiration and atelectasis even after the procedure and they understand that    1/19: FOR BRONCH TODAY  : AGAIN CONFIRMED WITH NEUROLOGISTS SON  1/20 : events noted:  was successful extubated after the procedure:  but then he desaturated with increasing secretions and ended up on high flow and adm to MICU : on recent chest xray has mod large effusion on rigth side:  I think it needs a tap:  would defer to [facility]   ; [facility]  1/21: dw fellow  consider tapping on rigth saide:  his PCP and son at bedside:  he is not obviously sob but still on 100%  high flow   1/22: he is doing poorly:  WBC increased:  bronch culturs with staph : nares with staph ? add vanco:  defer to MICU : in addition:  he has large effusion 0on rigth side: ? chest tube:  but brillinta needs to be stopped : [facility]  1/23: got intubated : sedated on vasopressors:  s/p brocnh : [facility] attending again  : possibly needs tap on rigth side   CADS/P CABG  -cont current medications   DM  monitor and control   CKD  -cont current meds   HTN   -controlled  CVA X 3  -doing ok :     [facility] family  and team  GI Bleed:   -secondary to Dieulafoy's lesion:  defer to primary team :    dvt prophylaxis    [facility] ATTENDING/; [facility]

## 2024-01-23 NOTE — PROGRESS NOTE ADULT - ATTENDING COMMENTS
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) admitted to MICU for acute respiratory failure, worsening s/p bronchoscopy 1/19, worsening hypoxemia and mental status today requiring intubation (1/22).    #Acute hypoxemic respiratory failure  #Staph Aureus Pneumonia  #Dysphagia  - c/w sedation for now, will lighten as tolerated  - c/w full vent support for MSSA pna and aspiration pneumonia, PS trials as tolerated  - restart tube feeds   - c/w zosyn, f/u bronch cultures  - need to clarify with vascular and cardiology if Brilinta can be held for a number of days for non-urgent thoracentesis (do not think that a bilateral thoracentesis will help with chronic aspiration as bronch yesterday showed bilious fluid, but will see if it can be done on a non-urgent basis, if brilinta can be held)

## 2024-01-23 NOTE — PROGRESS NOTE ADULT - SUBJECTIVE AND OBJECTIVE BOX
Date of Service: 01-23-24 @ 11:08    Patient is a 89y old  Male who presents with a chief complaint of COVID19, Chest pain (23 Jan 2024 07:01)      Any change in ROS: intubated and is on vasopressors:     MEDICATIONS  (STANDING):  albuterol/ipratropium for Nebulization 3 milliLiter(s) Nebulizer every 6 hours  amLODIPine   Tablet 5 milliGRAM(s) Oral daily  aspirin  chewable 81 milliGRAM(s) Oral daily  chlorhexidine 0.12% Liquid 15 milliLiter(s) Oral Mucosa every 12 hours  chlorhexidine 2% Cloths 1 Application(s) Topical daily  dexMEDEtomidine Infusion 0.1 MICROgram(s)/kG/Hr (1.98 mL/Hr) IV Continuous <Continuous>  dextrose 5%. 1000 milliLiter(s) (100 mL/Hr) IV Continuous <Continuous>  dextrose 5%. 1000 milliLiter(s) (50 mL/Hr) IV Continuous <Continuous>  dextrose 50% Injectable 25 Gram(s) IV Push once  dextrose 50% Injectable 25 Gram(s) IV Push once  dextrose 50% Injectable 12.5 Gram(s) IV Push once  escitalopram 10 milliGRAM(s) Oral daily  glucagon  Injectable 1 milliGRAM(s) IntraMuscular once  heparin   Injectable 5000 Unit(s) SubCutaneous every 8 hours  insulin lispro (ADMELOG) corrective regimen sliding scale   SubCutaneous every 6 hours  insulin NPH human recombinant 7 Unit(s) SubCutaneous every 6 hours  levETIRAcetam  IVPB 250 milliGRAM(s) IV Intermittent every 12 hours  lidocaine   4% Patch 1 Patch Transdermal every 24 hours  methylPREDNISolone sodium succinate Injectable 40 milliGRAM(s) IV Push daily  metoprolol tartrate 25 milliGRAM(s) Oral two times a day  mupirocin 2% Ointment 1 Application(s) Topical two times a day  norepinephrine Infusion 0.05 MICROgram(s)/kG/Min (7.43 mL/Hr) IV Continuous <Continuous>  pantoprazole  Injectable 40 milliGRAM(s) IV Push every 12 hours  piperacillin/tazobactam IVPB.. 3.375 Gram(s) IV Intermittent every 8 hours  polyethylene glycol 3350 17 Gram(s) Oral daily  propofol Infusion 40 MICROgram(s)/kG/Min (19 mL/Hr) IV Continuous <Continuous>  ranolazine 500 milliGRAM(s) Oral two times a day  senna 2 Tablet(s) Oral at bedtime  sodium chloride 3%  Inhalation 4 milliLiter(s) Inhalation every 6 hours  sucralfate 1 Gram(s) Oral every 12 hours  ticagrelor 60 milliGRAM(s) Oral every 12 hours    MEDICATIONS  (PRN):  acetaminophen     Tablet .. 650 milliGRAM(s) Oral every 6 hours PRN Temp greater or equal to 38C (100.4F), Mild Pain (1 - 3), Moderate Pain (4 - 6)  dextrose Oral Gel 15 Gram(s) Oral once PRN Blood Glucose LESS THAN 70 milliGRAM(s)/deciliter  guaiFENesin Oral Liquid (Sugar-Free) 100 milliGRAM(s) Oral every 6 hours PRN Cough    Vital Signs Last 24 Hrs  T(C): 37.2 (23 Jan 2024 08:00), Max: 37.3 (22 Jan 2024 16:00)  T(F): 99 (23 Jan 2024 08:00), Max: 99.1 (22 Jan 2024 16:00)  HR: 81 (23 Jan 2024 11:00) (67 - 94)  BP: 135/44 (23 Jan 2024 11:00) (114/91 - 157/51)  BP(mean): 66 (23 Jan 2024 11:00) (64 - 91)  RR: 20 (23 Jan 2024 11:00) (17 - 47)  SpO2: 99% (23 Jan 2024 11:00) (70% - 100%)    Parameters below as of 23 Jan 2024 11:00  Patient On (Oxygen Delivery Method): ventilator    O2 Concentration (%): 50  Mode: AC/ CMV (Assist Control/ Continuous Mandatory Ventilation)  RR (machine): 20  TV (machine): 400  FiO2: 60  PEEP: 8  ITime: 0.98  MAP: 14  PIP: 31    I&O's Summary    22 Jan 2024 07:01  -  23 Jan 2024 07:00  --------------------------------------------------------  IN: 1375.1 mL / OUT: 2150 mL / NET: -774.9 mL    23 Jan 2024 07:01  -  23 Jan 2024 11:08  --------------------------------------------------------  IN: 249.4 mL / OUT: 175 mL / NET: 74.4 mL          Physical Exam:   GENERAL: NAD, well-groomed, well-developed  HEENT: JAVAD/   Atraumatic, Normocephalic  ENMT: No tonsillar erythema, exudates, or enlargement; Moist mucous membranes, Good dentition, No lesions  NECK: Supple, No JVD, Normal thyroid  CHEST/LUNG: Clear to auscultaion-no wheezing  CVS: Regular rate and rhythm; No murmurs, rubs, or gallops  GI: : Soft, Nontender, Nondistended; Bowel sounds present  NERVOUS SYSTEM:  sedated and intubated on vasopressors  EXTREMITIES: - edema  LYMPH: No lymphadenopathy noted  SKIN: No rashes or lesions  ENDOCRINOLOGY: No Thyromegaly  PSYCH: sedated    Labs:  ABG - ( 23 Jan 2024 00:40 )  pH, Arterial: 7.49  pH, Blood: x     /  pCO2: 37    /  pO2: 92    / HCO3: 28    / Base Excess: 4.6   /  SaO2: 98.6            28                            8.8    21.23 )-----------( 332      ( 23 Jan 2024 00:40 )             27.4                         8.7    21.18 )-----------( 298      ( 22 Jan 2024 03:53 )             26.6                         8.4    17.58 )-----------( 274      ( 21 Jan 2024 04:05 )             26.1                         8.7    16.20 )-----------( 271      ( 20 Jan 2024 01:00 )             27.3     01-23    139  |  97<L>  |  26<H>  ----------------------------<  329<H>  3.0<L>   |  25  |  1.79<H>  01-22    143  |  112<H>  |  25<H>  ----------------------------<  293<H>  4.7   |  21<L>  |  1.04  01-22    139  |  97<L>  |  24<H>  ----------------------------<  240<H>  3.4<L>   |  25  |  1.79<H>  01-21    138  |  99  |  23  ----------------------------<  223<H>  4.8   |  24  |  1.76<H>  01-21    138  |  100  |  20  ----------------------------<  169<H>  3.2<L>   |  25  |  1.72<H>  01-20    138  |  100  |  16  ----------------------------<  159<H>  4.1   |  26  |  1.63<H>    Ca    8.4      23 Jan 2024 00:40  Ca    8.6      22 Jan 2024 17:47  Ca    8.5      22 Jan 2024 03:53  Ca    8.3<L>      21 Jan 2024 20:41  Phos  1.9     01-23  Phos  2.1     01-22  Phos  3.7     01-22  Phos  3.8     01-21  Mg     2.00     01-23  Mg     2.20     01-22  Mg     1.90     01-22  Mg     2.00     01-21    TPro  7.1  /  Alb  2.7<L>  /  TBili  0.5  /  DBili  x   /  AST  17  /  ALT  21  /  AlkPhos  83  01-23  TPro  6.5  /  Alb  2.3<L>  /  TBili  0.2  /  DBili  x   /  AST  26  /  ALT  13  /  AlkPhos  88  01-22  TPro  7.1  /  Alb  2.6<L>  /  TBili  0.7  /  DBili  x   /  AST  25  /  ALT  22  /  AlkPhos  78  01-22  TPro  6.7  /  Alb  2.6<L>  /  TBili  0.8  /  DBili  x   /  AST  21  /  ALT  17  /  AlkPhos  67  01-21  TPro  6.3  /  Alb  2.4<L>  /  TBili  0.5  /  DBili  x   /  AST  17  /  ALT  14  /  AlkPhos  50  01-20    CAPILLARY BLOOD GLUCOSE      POCT Blood Glucose.: 332 mg/dL (23 Jan 2024 05:29)  POCT Blood Glucose.: 295 mg/dL (22 Jan 2024 22:02)  POCT Blood Glucose.: 304 mg/dL (22 Jan 2024 17:43)  POCT Blood Glucose.: 248 mg/dL (22 Jan 2024 11:16)      LIVER FUNCTIONS - ( 23 Jan 2024 00:40 )  Alb: 2.7 g/dL / Pro: 7.1 g/dL / ALK PHOS: 83 U/L / ALT: 21 U/L / AST: 17 U/L / GGT: x           PT/INR - ( 23 Jan 2024 00:40 )   PT: 13.7 sec;   INR: 1.23 ratio         PTT - ( 23 Jan 2024 00:40 )  PTT:28.3 sec  Urinalysis Basic - ( 23 Jan 2024 00:40 )    Color: x / Appearance: x / SG: x / pH: x  Gluc: 329 mg/dL / Ketone: x  / Bili: x / Urobili: x   Blood: x / Protein: x / Nitrite: x   Leuk Esterase: x / RBC: x / WBC x   Sq Epi: x / Non Sq Epi: x / Bacteria: x      rd< from: Xray Chest 1 View- PORTABLE-Routine (Xray Chest 1 View- PORTABLE-Routine .) (01.22.24 @ 17:40) >    ACC: [id number] EXAM:  XR CHEST PORTABLE ROUTINE 1V   ORDERED BY: ARABELLA SERRA     PROCEDURE DATE:  01/22/2024          INTERPRETATION:  CLINICAL INFORMATION: Intubation    TIME OF EXAMINATION: January 22, 2024 at 5:25 PM    EXAM: Portable chest    FINDINGS:  The endotracheal tube is seen with its tip at the level of the thoracic   inlet above the fox.  Bilateral airspace opacities improved on the right side with a right   effusion and underlying atelectasis. Findings are consistent with   pulmonary edema.  Heart size is stable.  No pneumothorax.  Enteric tube with tip in the stomach.        COMPARISON: January 21        IMPRESSION: Follow-up with endotracheal tube in place, right effusion and   bilateral opacities consistent with pulmonary edema.    --- End of Report ---    < end of copied text >        RECENT CULTURES:  01-22 @ 15:05 .Bronchial Bronchial       Moderate polymorphonuclear leukocytes seen per low power field  No squamous epithelial cells per low power field  No organisms seen per oil power field             01-20 @ 13:59 .Blood Blood-Peripheral                No growth at 48 Hours    01-20 @ 13:49 .Blood Blood-Peripheral                No growth at 48 Hours    01-19 @ 11:35 .Bronchial R MIDDLE LOBE   ALDA    Moderate polymorphonuclear leukocytes per low power field  No squamous epithelial cells per low power field  Numerous Gram Negative Rods per oil power field  Few Gram Positive Cocci in Clusters per oil power field    Staphylococcus aureus  Staphylococcus aureus     Numerous Staphylococcus aureus  Normal Respiratory Aurelia present          RESPIRATORY CULTURES:          Studies  Chest X-RAY  CT SCAN Chest   Venous Dopplers: LE:   CT Abdomen  Others

## 2024-01-23 NOTE — CHART NOTE - NSCHARTNOTEFT_GEN_A_CORE
: Vincenzo Delgadillo    INDICATION: Critical Illness, Hypoxic respiratory failure    PROCEDURE:  [x ] LIMITED ECHO  [x ] LIMITED CHEST  [ ] LIMITED RETROPERITONEAL  [ ] LIMITED ABDOMINAL  [ ] LIMITED DVT  [ ] NEEDLE GUIDANCE VASCULAR  [ ] NEEDLE GUIDANCE THORACENTESIS  [ ] NEEDLE GUIDANCE PARACENTESIS  [ ] NEEDLE GUIDANCE PERICARDIOCENTESIS  [ ] OTHER    FINDINGS: Moderate right sided pleural effusion, small left sided pleural effusion. Numerous b-lines in the right lung.    - LVOT VTi of 13.4cm, MAPSE of 1.14cm, MV E/A of 1.10, MV E linda of 0.95m/s, DecT of 132ms, lateral E/E' of 12.34. IVC of 1.4cm with minimal variability      INTERPRETATION: B/L pleural effusions, concern for possible right sided pneumonia. EF estimated at 40% by visual inspection.      Images uploaded to [employer] : Vincenzo Delgadillo    INDICATION: Critical Illness, acute Hypoxic respiratory failure    PROCEDURE:  [x ] LIMITED ECHO  [x ] LIMITED CHEST  [ ] LIMITED RETROPERITONEAL  [ ] LIMITED ABDOMINAL  [ ] LIMITED DVT  [ ] NEEDLE GUIDANCE VASCULAR  [ ] NEEDLE GUIDANCE THORACENTESIS  [ ] NEEDLE GUIDANCE PARACENTESIS  [ ] NEEDLE GUIDANCE PERICARDIOCENTESIS  [ ] OTHER    FINDINGS: Moderate right sided pleural effusion, small left sided pleural effusion. Numerous b-lines in the right lung.    - LVOT VTi of 13.4cm by PW doppler, MAPSE of 1.14cm, MV E/A of 1.10, MV E linda of 0.95m/s, DecT of 132ms, lateral E/E' of 12.34. IVC of 1.4cm with minimal variability      INTERPRETATION: B/L pleural effusions, concern for possible right sided pneumonia. At least moderately reduced LVSF      Images uploaded to [employer]    Attending Attestation:  I was present during the key portions of the procedure and immediately available during the entire procedure.  Aiden Lemon MD  Attending  Pulmonary & Critical Care Medicine

## 2024-01-23 NOTE — PROGRESS NOTE ADULT - SUBJECTIVE AND OBJECTIVE BOX
NEPHROLOGY     Patient seen and examined with family at bedside, intubated and sedated, on levo gtt.     MEDICATIONS  (STANDING):  albuterol/ipratropium for Nebulization 3 milliLiter(s) Nebulizer every 6 hours  amLODIPine   Tablet 5 milliGRAM(s) Oral daily  aspirin  chewable 81 milliGRAM(s) Oral daily  chlorhexidine 0.12% Liquid 15 milliLiter(s) Oral Mucosa every 12 hours  chlorhexidine 2% Cloths 1 Application(s) Topical daily  dexMEDEtomidine Infusion 0.1 MICROgram(s)/kG/Hr (1.98 mL/Hr) IV Continuous <Continuous>  dextrose 5%. 1000 milliLiter(s) (100 mL/Hr) IV Continuous <Continuous>  dextrose 5%. 1000 milliLiter(s) (50 mL/Hr) IV Continuous <Continuous>  dextrose 50% Injectable 25 Gram(s) IV Push once  dextrose 50% Injectable 25 Gram(s) IV Push once  dextrose 50% Injectable 12.5 Gram(s) IV Push once  escitalopram 10 milliGRAM(s) Oral daily  glucagon  Injectable 1 milliGRAM(s) IntraMuscular once  heparin   Injectable 5000 Unit(s) SubCutaneous every 8 hours  insulin lispro (ADMELOG) corrective regimen sliding scale   SubCutaneous every 6 hours  insulin NPH human recombinant 7 Unit(s) SubCutaneous every 6 hours  levETIRAcetam  IVPB 250 milliGRAM(s) IV Intermittent every 12 hours  lidocaine   4% Patch 1 Patch Transdermal every 24 hours  methylPREDNISolone sodium succinate Injectable 40 milliGRAM(s) IV Push daily  metoprolol tartrate 25 milliGRAM(s) Oral two times a day  mupirocin 2% Ointment 1 Application(s) Topical two times a day  norepinephrine Infusion 0.05 MICROgram(s)/kG/Min (7.43 mL/Hr) IV Continuous <Continuous>  pantoprazole  Injectable 40 milliGRAM(s) IV Push every 12 hours  piperacillin/tazobactam IVPB.. 3.375 Gram(s) IV Intermittent every 8 hours  polyethylene glycol 3350 17 Gram(s) Oral daily  propofol Infusion 40 MICROgram(s)/kG/Min (19 mL/Hr) IV Continuous <Continuous>  ranolazine 500 milliGRAM(s) Oral two times a day  senna 2 Tablet(s) Oral at bedtime  sodium chloride 3%  Inhalation 4 milliLiter(s) Inhalation every 6 hours  sucralfate 1 Gram(s) Oral every 12 hours  ticagrelor 60 milliGRAM(s) Oral every 12 hours    VITALS:  T(C): , Max: 37.3 (01-22-24 @ 16:00)  T(F): , Max: 99.1 (01-22-24 @ 16:00)  HR: 82 (01-23-24 @ 11:19)  BP: 135/44 (01-23-24 @ 11:00)  BP(mean): 66 (01-23-24 @ 11:00)  RR: 20 (01-23-24 @ 11:00)  SpO2: 100% (01-23-24 @ 11:19)    I and O's:    01-22 @ 07:01  -  01-23 @ 07:00  --------------------------------------------------------  IN: 1375.1 mL / OUT: 2150 mL / NET: -774.9 mL    01-23 @ 07:01  -  01-23 @ 12:09  --------------------------------------------------------  IN: 249.4 mL / OUT: 175 mL / NET: 74.4 mL    PHYSICAL EXAM:  Constitutional: intubated and sedated   Neck: No JVD  Respiratory: diminished bs   Cardiovascular: S1, S2, RRR  Gastrointestinal: BS+, soft, NT/ND  Extremities: + le edema   Neurological: uto  : no delong    LABS:                        8.8    21.23 )-----------( 332      ( 23 Jan 2024 00:40 )             27.4     01-23    139  |  97<L>  |  26<H>  ----------------------------<  329<H>  3.0<L>   |  25  |  1.79<H>    Ca    8.4      23 Jan 2024 00:40  Phos  1.9     01-23  Mg     2.00     01-23    TPro  7.1  /  Alb  2.7<L>  /  TBili  0.5  /  DBili  x   /  AST  17  /  ALT  21  /  AlkPhos  83  01-23    RADIOLOGY & ADDITIONAL STUDIES:  ad< from: Xray Chest 1 View- PORTABLE-Routine (Xray Chest 1 View- PORTABLE-Routine .) (01.22.24 @ 17:40) >    IMPRESSION: Follow-up with endotracheal tube in place, right effusion and   bilateral opacities consistent with pulmonary edema.    --- End of Report ---    AMELIA ANGLIN MD; Attending Radiologist  This document has been electronically signed. Jan 23 2024 10:31AM    < end of copied text >

## 2024-01-23 NOTE — PROGRESS NOTE ADULT - SUBJECTIVE AND OBJECTIVE BOX
PROGRESS NOTE:   Authored by Dr. Beata Wahl MD (PGY-1). Pager [facility] [phone] / KATHIA ( [id number]) or via TEAMS    Patient is a 89y old  Male who presents with a chief complaint of COVID19, Chest pain (22 Jan 2024 12:59)      SUBJECTIVE / OVERNIGHT EVENTS:  No acute events overnight.     ADDITIONAL REVIEW OF SYSTEMS:  Patient denies fevers, chills, chest pain, shortness of breath, nausea, abdominal pain, diarrhea, constipation, dysuria, leg swelling, headache, light headedness.    MEDICATIONS  (STANDING):  albuterol/ipratropium for Nebulization 3 milliLiter(s) Nebulizer every 6 hours  amLODIPine   Tablet 5 milliGRAM(s) Oral daily  aspirin  chewable 81 milliGRAM(s) Oral daily  chlorhexidine 0.12% Liquid 15 milliLiter(s) Oral Mucosa every 12 hours  chlorhexidine 2% Cloths 1 Application(s) Topical daily  dexMEDEtomidine Infusion 0.1 MICROgram(s)/kG/Hr (1.98 mL/Hr) IV Continuous <Continuous>  escitalopram 10 milliGRAM(s) Oral daily  heparin   Injectable 5000 Unit(s) SubCutaneous every 8 hours  insulin glargine Injectable (LANTUS) 7 Unit(s) SubCutaneous at bedtime  insulin lispro (ADMELOG) corrective regimen sliding scale   SubCutaneous every 6 hours  levETIRAcetam  IVPB 250 milliGRAM(s) IV Intermittent every 12 hours  lidocaine   4% Patch 1 Patch Transdermal every 24 hours  methylPREDNISolone sodium succinate Injectable 40 milliGRAM(s) IV Push daily  metoprolol tartrate 25 milliGRAM(s) Oral two times a day  mupirocin 2% Ointment 1 Application(s) Topical two times a day  norepinephrine Infusion 0.05 MICROgram(s)/kG/Min (7.43 mL/Hr) IV Continuous <Continuous>  pantoprazole  Injectable 40 milliGRAM(s) IV Push every 12 hours  piperacillin/tazobactam IVPB.. 3.375 Gram(s) IV Intermittent every 8 hours  polyethylene glycol 3350 17 Gram(s) Oral daily  propofol Infusion 40 MICROgram(s)/kG/Min (19 mL/Hr) IV Continuous <Continuous>  QUEtiapine 12.5 milliGRAM(s) Oral at bedtime  ranolazine 500 milliGRAM(s) Oral two times a day  senna 2 Tablet(s) Oral at bedtime  sodium chloride 3%  Inhalation 4 milliLiter(s) Inhalation every 6 hours  sucralfate 1 Gram(s) Oral every 12 hours  ticagrelor 60 milliGRAM(s) Oral every 12 hours    MEDICATIONS  (PRN):  acetaminophen     Tablet .. 650 milliGRAM(s) Oral every 6 hours PRN Temp greater or equal to 38C (100.4F), Mild Pain (1 - 3), Moderate Pain (4 - 6)  guaiFENesin Oral Liquid (Sugar-Free) 100 milliGRAM(s) Oral every 6 hours PRN Cough      CAPILLARY BLOOD GLUCOSE      POCT Blood Glucose.: 332 mg/dL (23 Jan 2024 05:29)  POCT Blood Glucose.: 295 mg/dL (22 Jan 2024 22:02)  POCT Blood Glucose.: 304 mg/dL (22 Jan 2024 17:43)  POCT Blood Glucose.: 248 mg/dL (22 Jan 2024 11:16)    I&O's Summary    22 Jan 2024 07:01  -  23 Jan 2024 07:00  --------------------------------------------------------  IN: 1375.1 mL / OUT: 2150 mL / NET: -774.9 mL        PHYSICAL EXAM:  Vital Signs Last 24 Hrs  T(C): 37 (23 Jan 2024 04:00), Max: 37.3 (22 Jan 2024 16:00)  T(F): 98.6 (23 Jan 2024 04:00), Max: 99.1 (22 Jan 2024 16:00)  HR: 85 (23 Jan 2024 07:00) (75 - 94)  BP: 146/52 (23 Jan 2024 07:00) (114/91 - 157/51)  BP(mean): 76 (23 Jan 2024 07:00) (63 - 91)  RR: 20 (23 Jan 2024 07:00) (17 - 47)  SpO2: 100% (23 Jan 2024 07:00) (70% - 100%)    Parameters below as of 23 Jan 2024 07:00  Patient On (Oxygen Delivery Method): ventilator    O2 Concentration (%): 60    CONSTITUTIONAL: NAD, well-developed  RESPIRATORY: Normal respiratory effort; lungs are clear to auscultation bilaterally  CARDIOVASCULAR: Regular rate and rhythm, normal S1 and S2, no murmur/rub/gallop; No lower extremity edema; Peripheral pulses are 2+ bilaterally  ABDOMEN: Nontender to palpation, normoactive bowel sounds, no rebound/guarding; No hepatosplenomegaly  MSK: no clubbing or cyanosis of digits; no joint swelling or tenderness to palpation  PSYCH: A+O to person, place, and time; affect appropriate    LABS:                        8.8    21.23 )-----------( 332      ( 23 Jan 2024 00:40 )             27.4     01-23    139  |  97<L>  |  26<H>  ----------------------------<  329<H>  3.0<L>   |  25  |  1.79<H>    Ca    8.4      23 Jan 2024 00:40  Phos  1.9     01-23  Mg     2.00     01-23    TPro  7.1  /  Alb  2.7<L>  /  TBili  0.5  /  DBili  x   /  AST  17  /  ALT  21  /  AlkPhos  83  01-23    PT/INR - ( 23 Jan 2024 00:40 )   PT: 13.7 sec;   INR: 1.23 ratio         PTT - ( 23 Jan 2024 00:40 )  PTT:28.3 sec      Urinalysis Basic - ( 23 Jan 2024 00:40 )    Color: x / Appearance: x / SG: x / pH: x  Gluc: 329 mg/dL / Ketone: x  / Bili: x / Urobili: x   Blood: x / Protein: x / Nitrite: x   Leuk Esterase: x / RBC: x / WBC x   Sq Epi: x / Non Sq Epi: x / Bacteria: x        Culture - Blood (collected 20 Jan 2024 13:59)  Source: .Blood Blood-Peripheral  Preliminary Report (22 Jan 2024 17:02):    No growth at 48 Hours    Culture - Blood (collected 20 Jan 2024 13:49)  Source: .Blood Blood-Peripheral  Preliminary Report (22 Jan 2024 17:02):    No growth at 48 Hours        Tele Reviewed:    RADIOLOGY & ADDITIONAL TESTS:  Results Reviewed:   Imaging Personally Reviewed:  Electrocardiogram Personally Reviewed:     PROGRESS NOTE:   Authored by Dr. Beata Wahl MD (PGY-1). Pager [facility] [phone] / KATHIA ( [id number]) or via TEAMS    Patient is a 89y old  Male who presents with a chief complaint of COVID19, Chest pain (22 Jan 2024 12:59)      SUBJECTIVE / OVERNIGHT EVENTS:  No acute events overnight. Sedated, making urine. On course of steroids.       MEDICATIONS  (STANDING):  albuterol/ipratropium for Nebulization 3 milliLiter(s) Nebulizer every 6 hours  amLODIPine   Tablet 5 milliGRAM(s) Oral daily  aspirin  chewable 81 milliGRAM(s) Oral daily  chlorhexidine 0.12% Liquid 15 milliLiter(s) Oral Mucosa every 12 hours  chlorhexidine 2% Cloths 1 Application(s) Topical daily  dexMEDEtomidine Infusion 0.1 MICROgram(s)/kG/Hr (1.98 mL/Hr) IV Continuous <Continuous>  escitalopram 10 milliGRAM(s) Oral daily  heparin   Injectable 5000 Unit(s) SubCutaneous every 8 hours  insulin glargine Injectable (LANTUS) 7 Unit(s) SubCutaneous at bedtime  insulin lispro (ADMELOG) corrective regimen sliding scale   SubCutaneous every 6 hours  levETIRAcetam  IVPB 250 milliGRAM(s) IV Intermittent every 12 hours  lidocaine   4% Patch 1 Patch Transdermal every 24 hours  methylPREDNISolone sodium succinate Injectable 40 milliGRAM(s) IV Push daily  metoprolol tartrate 25 milliGRAM(s) Oral two times a day  mupirocin 2% Ointment 1 Application(s) Topical two times a day  norepinephrine Infusion 0.05 MICROgram(s)/kG/Min (7.43 mL/Hr) IV Continuous <Continuous>  pantoprazole  Injectable 40 milliGRAM(s) IV Push every 12 hours  piperacillin/tazobactam IVPB.. 3.375 Gram(s) IV Intermittent every 8 hours  polyethylene glycol 3350 17 Gram(s) Oral daily  propofol Infusion 40 MICROgram(s)/kG/Min (19 mL/Hr) IV Continuous <Continuous>  QUEtiapine 12.5 milliGRAM(s) Oral at bedtime  ranolazine 500 milliGRAM(s) Oral two times a day  senna 2 Tablet(s) Oral at bedtime  sodium chloride 3%  Inhalation 4 milliLiter(s) Inhalation every 6 hours  sucralfate 1 Gram(s) Oral every 12 hours  ticagrelor 60 milliGRAM(s) Oral every 12 hours    MEDICATIONS  (PRN):  acetaminophen     Tablet .. 650 milliGRAM(s) Oral every 6 hours PRN Temp greater or equal to 38C (100.4F), Mild Pain (1 - 3), Moderate Pain (4 - 6)  guaiFENesin Oral Liquid (Sugar-Free) 100 milliGRAM(s) Oral every 6 hours PRN Cough      CAPILLARY BLOOD GLUCOSE      POCT Blood Glucose.: 332 mg/dL (23 Jan 2024 05:29)  POCT Blood Glucose.: 295 mg/dL (22 Jan 2024 22:02)  POCT Blood Glucose.: 304 mg/dL (22 Jan 2024 17:43)  POCT Blood Glucose.: 248 mg/dL (22 Jan 2024 11:16)    I&O's Summary    22 Jan 2024 07:01  -  23 Jan 2024 07:00  --------------------------------------------------------  IN: 1375.1 mL / OUT: 2150 mL / NET: -774.9 mL        PHYSICAL EXAM:  Vital Signs Last 24 Hrs  T(C): 37 (23 Jan 2024 04:00), Max: 37.3 (22 Jan 2024 16:00)  T(F): 98.6 (23 Jan 2024 04:00), Max: 99.1 (22 Jan 2024 16:00)  HR: 85 (23 Jan 2024 07:00) (75 - 94)  BP: 146/52 (23 Jan 2024 07:00) (114/91 - 157/51)  BP(mean): 76 (23 Jan 2024 07:00) (63 - 91)  RR: 20 (23 Jan 2024 07:00) (17 - 47)  SpO2: 100% (23 Jan 2024 07:00) (70% - 100%)    Parameters below as of 23 Jan 2024 07:00  Patient On (Oxygen Delivery Method): ventilator    O2 Concentration (%): 60    CONSTITUTIONAL: intubated sedated   RESPIRATORY: Normal respiratory effort; lungs are clear to auscultation bilaterally  CARDIOVASCULAR: Regular rate and rhythm, normal S1 and S2, no murmur/rub/gallop; No lower extremity edema; Peripheral pulses are 2+ bilaterally  ABDOMEN: Nontender to palpation, normoactive bowel sounds, no rebound/guarding; No hepatosplenomegaly  MSK: no clubbing or cyanosis of digits; no joint swelling or tenderness to palpation  PSYCH: sedated     LABS:                        8.8    21.23 )-----------( 332      ( 23 Jan 2024 00:40 )             27.4     01-23    139  |  97<L>  |  26<H>  ----------------------------<  329<H>  3.0<L>   |  25  |  1.79<H>    Ca    8.4      23 Jan 2024 00:40  Phos  1.9     01-23  Mg     2.00     01-23    TPro  7.1  /  Alb  2.7<L>  /  TBili  0.5  /  DBili  x   /  AST  17  /  ALT  21  /  AlkPhos  83  01-23    PT/INR - ( 23 Jan 2024 00:40 )   PT: 13.7 sec;   INR: 1.23 ratio         PTT - ( 23 Jan 2024 00:40 )  PTT:28.3 sec      Urinalysis Basic - ( 23 Jan 2024 00:40 )    Color: x / Appearance: x / SG: x / pH: x  Gluc: 329 mg/dL / Ketone: x  / Bili: x / Urobili: x   Blood: x / Protein: x / Nitrite: x   Leuk Esterase: x / RBC: x / WBC x   Sq Epi: x / Non Sq Epi: x / Bacteria: x        Culture - Blood (collected 20 Jan 2024 13:59)  Source: .Blood Blood-Peripheral  Preliminary Report (22 Jan 2024 17:02):    No growth at 48 Hours    Culture - Blood (collected 20 Jan 2024 13:49)  Source: .Blood Blood-Peripheral  Preliminary Report (22 Jan 2024 17:02):    No growth at 48 Hours        Tele Reviewed:    RADIOLOGY & ADDITIONAL TESTS:  Results Reviewed:   Imaging Personally Reviewed:  Electrocardiogram Personally Reviewed:

## 2024-01-23 NOTE — CHART NOTE - NSCHARTNOTEFT_GEN_A_CORE
Okay to temporarily hold Brilinta, continue aspirin, resume Brilinta as soon as possible. Okay to temporarily hold Brilinta, continue aspirin, resume Brilinta as soon as possible.    C Team/Vascular Surgery  Please page [phone] for all questions.

## 2024-01-23 NOTE — PROGRESS NOTE ADULT - ASSESSMENT
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  4 CV - BP controlled   5 Vasc - s/p SMA stent placement;   6 Sx - s/p HIDA + for acute asa, medical management, on puree diet , encourage free water in take too (if allowed  with aspiration precautions)   7 GI - s/p EGD clipping of bleeding duodenal ulcers   8 Anemia- hgb stable   9 Pul-chest PT, sp bronch , b/l moderated effusion with atelectasis and ground glass opacities   CXR with b/l effusion - intubated yesterday     Faby Cummins DNP  [facility]  [phone]          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  4 CV - BP controlled   5 Vasc - s/p SMA stent placement;   6 Sx - s/p HIDA + for acute asa, medical management, on puree diet , encourage free water in take too (if allowed  with aspiration precautions)   7 GI - s/p EGD clipping of bleeding duodenal ulcers   8 Anemia- hgb stable   9 Pul-chest PT, sp bronch , b/l moderated effusion with atelectasis and ground glass opacities   CXR with b/l effusion - intubated yesterday     D/w Dr. Mayra Cummins, [facility]  [phone]

## 2024-01-23 NOTE — PROGRESS NOTE ADULT - ASSESSMENT
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     PLAN  =====Neurologic=====  #CVA  - prior CVA x3 with no residual deficits  #myoclonic jerks  - on Keppra will continue  - holding home lexapro gabapentin and memantine given persistent secretions/ cough   - can restart once enteric access is established  - low dose Seroquel started 1/21     =====Pulmonary=====  #COVID  - s/p paxlovid and 1 dose remdesivir while inpatient  #Pleural effusions b/l  - CT chest from 1/16 showing new small bilateral pleural effusions with associated complete collapse  of the right middle and right lower lobes and partial atelectasis of the  right upper and left lower lobes. patchy bilateral ground-glass opacities are consistent with pulmonary edema. layering secretions in the trachea, left mainstem bronchus, and right lower lobe bronchus  - currently on cefepime and azithro for empiric PNA coverage will switch to zosyn and dc azithro   - s/p bronch for increasing secretions- on Mucomyst, hypertonic saline, albuterol, guaifenesin, mometasone will continue   - bronchial cultures with staph aureus sensitive to bactrim/ Unasyn consider switching?   - currently on high-flow 60% 50L, will wean,  fu ABGs worsening hypercapnia possible intubation pending GOC    =====Cardiovascular=====  Patient does not currently require vasopressors and is normotensive  #HTN  - on home amlodipine and metoprolol, will continue    #CAD  - on Brilinta aspirin and ranolazine continue     =====GI=====  #upper GI bleed  - s/p EGD showing dieulafoy lesion and esophagitis likely 2/2 NGT irritation s/p 2 clips  - on protonix IV BID continue     #Diet  - tube feeds NGT     =====Renal/=====  #Electrolytes  - Maintain K>4, Phos>3, Mag>2, iCal>1  - baseline cr 1.5, at baseline      =====Endocrine=====  #DM2  - on lantus 14units and SSI  - a1c 9.3, glucose wnl inpatient     =====Infectious Disease=====  #COVID   - s/p paxlovid and 1 dose remdesivir  # PNA  - CT chest showing ground glass opacities  - treatment with azithro and cefepime currently- switch to zosyn on 1/20 for aspiration coverage, dc azithro   - f/u urine legionella  - no white count or fever thus far  - f/u bronchial cultures - staph aureus   - f/u blood cultures given fever overnight on 1/20- NGTD     =====Heme/Onc=====  #DVT Ppx  - Lovenox 40mg SQ qd    =====Ethics=====  FULL CODE. 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     PLAN  =====Neurologic=====  #CVA  - prior CVA x3 with no residual deficits  #myoclonic jerks  - on Keppra will continue  - holding home lexapro gabapentin and memantine given persistent secretions/ cough   - can restart once enteric access is established  - low dose Seroquel started 1/21     =====Pulmonary=====  #COVID  - s/p paxlovid and 1 dose remdesivir while inpatient  #Pleural effusions b/l  - CT chest from 1/16 showing new small bilateral pleural effusions with associated complete collapse  of the right middle and right lower lobes and partial atelectasis of the  right upper and left lower lobes. patchy bilateral ground-glass opacities are consistent with pulmonary edema. layering secretions in the trachea, left mainstem bronchus, and right lower lobe bronchus  - currently on cefepime and azithro for empiric PNA coverage will switch to zosyn and dc azithro   - s/p bronch for increasing secretions- on Mucomyst, hypertonic saline, albuterol, guaifenesin, mometasone will continue   - bronchial cultures with staph aureus sensitive to bactrim/ Unasyn consider switching?   - currently on high-flow 60% 50L, will wean,  fu ABGs worsening hypercapnia possible intubation pending GOC    =====Cardiovascular=====  Patient does not currently require vasopressors and is normotensive  #HTN  - on home amlodipine and metoprolol, will continue    #CAD  - on Brilinta aspirin and ranolazine continue     =====GI=====  #upper GI bleed  - s/p EGD showing dieulafoy lesion and esophagitis likely 2/2 NGT irritation s/p 2 clips  - on protonix IV BID continue     #Diet  - tube feeds NGT     =====Renal/=====  #Electrolytes  - Maintain K>4, Phos>3, Mag>2, iCal>1  - baseline cr 1.5, at baseline      =====Endocrine=====  #DM2  - on lantus 14units and SSI  - a1c 9.3, glucose wnl inpatient     =====Infectious Disease=====  #COVID   - s/p paxlovid and 1 dose remdesivir  # PNA  - CT chest showing ground glass opacities  - treatment with azithro and cefepime currently- switch to zosyn on 1/20 for aspiration coverage, dc azithro   - f/u urine legionella  - no white count or fever thus far  - f/u bronchial cultures - staph aureus   - f/u blood cultures given fever overnight on 1/20- NGTD     =====Heme/Onc=====  #DVT Ppx  - Lovenox 40mg SQ qd    =====Ethics=====  FULL CODE.

## 2024-01-24 LAB
ALBUMIN SERPL ELPH-MCNC: 2.5 G/DL — LOW (ref 3.3–5)
ALP SERPL-CCNC: 104 U/L — SIGNIFICANT CHANGE UP (ref 40–120)
ALT FLD-CCNC: 16 U/L — SIGNIFICANT CHANGE UP (ref 4–41)
ANION GAP SERPL CALC-SCNC: 12 MMOL/L — SIGNIFICANT CHANGE UP (ref 7–14)
APTT BLD: 34.3 SEC — SIGNIFICANT CHANGE UP (ref 24.5–35.6)
AST SERPL-CCNC: 17 U/L — SIGNIFICANT CHANGE UP (ref 4–40)
BASE EXCESS BLDV CALC-SCNC: 7.7 MMOL/L — HIGH (ref -2–3)
BILIRUB SERPL-MCNC: 0.4 MG/DL — SIGNIFICANT CHANGE UP (ref 0.2–1.2)
BUN SERPL-MCNC: 31 MG/DL — HIGH (ref 7–23)
CA-I SERPL-SCNC: 1.13 MMOL/L — LOW (ref 1.15–1.33)
CALCIUM SERPL-MCNC: 8.2 MG/DL — LOW (ref 8.4–10.5)
CHLORIDE BLDV-SCNC: 104 MMOL/L — SIGNIFICANT CHANGE UP (ref 96–108)
CHLORIDE SERPL-SCNC: 100 MMOL/L — SIGNIFICANT CHANGE UP (ref 98–107)
CO2 BLDV-SCNC: 33.3 MMOL/L — HIGH (ref 22–26)
CO2 SERPL-SCNC: 29 MMOL/L — SIGNIFICANT CHANGE UP (ref 22–31)
CREAT SERPL-MCNC: 1.66 MG/DL — HIGH (ref 0.5–1.3)
CULTURE RESULTS: SIGNIFICANT CHANGE UP
EGFR: 39 ML/MIN/1.73M2 — LOW
GAS PNL BLDV: 141 MMOL/L — SIGNIFICANT CHANGE UP (ref 136–145)
GAS PNL BLDV: SIGNIFICANT CHANGE UP
GLUCOSE BLDC GLUCOMTR-MCNC: 203 MG/DL — HIGH (ref 70–99)
GLUCOSE BLDC GLUCOMTR-MCNC: 232 MG/DL — HIGH (ref 70–99)
GLUCOSE BLDC GLUCOMTR-MCNC: 298 MG/DL — HIGH (ref 70–99)
GLUCOSE BLDV-MCNC: 420 MG/DL — HIGH (ref 70–99)
GLUCOSE SERPL-MCNC: 398 MG/DL — HIGH (ref 70–99)
HCO3 BLDV-SCNC: 32 MMOL/L — HIGH (ref 22–29)
HCT VFR BLD CALC: 25.4 % — LOW (ref 39–50)
HCT VFR BLDA CALC: 26 % — LOW (ref 39–51)
HGB BLD CALC-MCNC: 8.6 G/DL — LOW (ref 12.6–17.4)
HGB BLD-MCNC: 8.2 G/DL — LOW (ref 13–17)
INR BLD: 1.13 RATIO — SIGNIFICANT CHANGE UP (ref 0.85–1.18)
LACTATE BLDV-MCNC: 2 MMOL/L — SIGNIFICANT CHANGE UP (ref 0.5–2)
MAGNESIUM SERPL-MCNC: 2 MG/DL — SIGNIFICANT CHANGE UP (ref 1.6–2.6)
MCHC RBC-ENTMCNC: 29.7 PG — SIGNIFICANT CHANGE UP (ref 27–34)
MCHC RBC-ENTMCNC: 32.3 GM/DL — SIGNIFICANT CHANGE UP (ref 32–36)
MCV RBC AUTO: 92 FL — SIGNIFICANT CHANGE UP (ref 80–100)
NRBC # BLD: 0 /100 WBCS — SIGNIFICANT CHANGE UP (ref 0–0)
NRBC # FLD: 0.05 K/UL — HIGH (ref 0–0)
PCO2 BLDV: 43 MMHG — SIGNIFICANT CHANGE UP (ref 42–55)
PH BLDV: 7.48 — HIGH (ref 7.32–7.43)
PHOSPHATE SERPL-MCNC: 1.7 MG/DL — LOW (ref 2.5–4.5)
PLATELET # BLD AUTO: 305 K/UL — SIGNIFICANT CHANGE UP (ref 150–400)
PO2 BLDV: 63 MMHG — HIGH (ref 25–45)
POTASSIUM BLDV-SCNC: 3.9 MMOL/L — SIGNIFICANT CHANGE UP (ref 3.5–5.1)
POTASSIUM SERPL-MCNC: 3.8 MMOL/L — SIGNIFICANT CHANGE UP (ref 3.5–5.3)
POTASSIUM SERPL-SCNC: 3.8 MMOL/L — SIGNIFICANT CHANGE UP (ref 3.5–5.3)
PROT SERPL-MCNC: 6.5 G/DL — SIGNIFICANT CHANGE UP (ref 6–8.3)
PROTHROM AB SERPL-ACNC: 12.7 SEC — SIGNIFICANT CHANGE UP (ref 9.5–13)
RBC # BLD: 2.76 M/UL — LOW (ref 4.2–5.8)
RBC # FLD: 16.9 % — HIGH (ref 10.3–14.5)
SAO2 % BLDV: 93.8 % — HIGH (ref 67–88)
SODIUM SERPL-SCNC: 141 MMOL/L — SIGNIFICANT CHANGE UP (ref 135–145)
SPECIMEN SOURCE: SIGNIFICANT CHANGE UP
WBC # BLD: 16.4 K/UL — HIGH (ref 3.8–10.5)
WBC # FLD AUTO: 16.4 K/UL — HIGH (ref 3.8–10.5)

## 2024-01-24 PROCEDURE — 99291 CRITICAL CARE FIRST HOUR: CPT

## 2024-01-24 PROCEDURE — 99223 1ST HOSP IP/OBS HIGH 75: CPT | Mod: GC

## 2024-01-24 PROCEDURE — 93971 EXTREMITY STUDY: CPT | Mod: 26,LT

## 2024-01-24 RX ORDER — ASPIRIN/CALCIUM CARB/MAGNESIUM 324 MG
81 TABLET ORAL DAILY
Refills: 0 | Status: DISCONTINUED | OUTPATIENT
Start: 2024-01-24 | End: 2024-01-31

## 2024-01-24 RX ORDER — SENNA PLUS 8.6 MG/1
2 TABLET ORAL AT BEDTIME
Refills: 0 | Status: DISCONTINUED | OUTPATIENT
Start: 2024-01-24 | End: 2024-01-24

## 2024-01-24 RX ORDER — AMLODIPINE BESYLATE 2.5 MG/1
5 TABLET ORAL DAILY
Refills: 0 | Status: DISCONTINUED | OUTPATIENT
Start: 2024-01-24 | End: 2024-01-25

## 2024-01-24 RX ORDER — SENNA PLUS 8.6 MG/1
10 TABLET ORAL AT BEDTIME
Refills: 0 | Status: DISCONTINUED | OUTPATIENT
Start: 2024-01-24 | End: 2024-01-29

## 2024-01-24 RX ORDER — SODIUM,POTASSIUM PHOSPHATES 278-250MG
1 POWDER IN PACKET (EA) ORAL THREE TIMES A DAY
Refills: 0 | Status: DISCONTINUED | OUTPATIENT
Start: 2024-01-24 | End: 2024-01-24

## 2024-01-24 RX ORDER — RANOLAZINE 500 MG/1
500 TABLET, FILM COATED, EXTENDED RELEASE ORAL
Refills: 0 | Status: DISCONTINUED | OUTPATIENT
Start: 2024-01-24 | End: 2024-01-24

## 2024-01-24 RX ORDER — SUCRALFATE 1 G
1 TABLET ORAL
Refills: 0 | Status: DISCONTINUED | OUTPATIENT
Start: 2024-01-24 | End: 2024-03-27

## 2024-01-24 RX ORDER — POLYETHYLENE GLYCOL 3350 17 G/17G
17 POWDER, FOR SOLUTION ORAL DAILY
Refills: 0 | Status: DISCONTINUED | OUTPATIENT
Start: 2024-01-24 | End: 2024-01-29

## 2024-01-24 RX ORDER — ESCITALOPRAM OXALATE 10 MG/1
10 TABLET, FILM COATED ORAL DAILY
Refills: 0 | Status: DISCONTINUED | OUTPATIENT
Start: 2024-01-24 | End: 2024-02-22

## 2024-01-24 RX ADMIN — LEVETIRACETAM 400 MILLIGRAM(S): 250 TABLET, FILM COATED ORAL at 05:12

## 2024-01-24 RX ADMIN — Medication 650 MILLIGRAM(S): at 05:13

## 2024-01-24 RX ADMIN — RANOLAZINE 500 MILLIGRAM(S): 500 TABLET, FILM COATED, EXTENDED RELEASE ORAL at 05:15

## 2024-01-24 RX ADMIN — Medication 1 PACKET(S): at 15:07

## 2024-01-24 RX ADMIN — LIDOCAINE 1 PATCH: 4 CREAM TOPICAL at 08:00

## 2024-01-24 RX ADMIN — SODIUM CHLORIDE 4 MILLILITER(S): 9 INJECTION INTRAMUSCULAR; INTRAVENOUS; SUBCUTANEOUS at 21:53

## 2024-01-24 RX ADMIN — Medication 81 MILLIGRAM(S): at 12:33

## 2024-01-24 RX ADMIN — Medication 650 MILLIGRAM(S): at 05:54

## 2024-01-24 RX ADMIN — Medication 40 MILLIEQUIVALENT(S): at 02:17

## 2024-01-24 RX ADMIN — CHLORHEXIDINE GLUCONATE 15 MILLILITER(S): 213 SOLUTION TOPICAL at 05:13

## 2024-01-24 RX ADMIN — PIPERACILLIN AND TAZOBACTAM 25 GRAM(S): 4; .5 INJECTION, POWDER, LYOPHILIZED, FOR SOLUTION INTRAVENOUS at 22:24

## 2024-01-24 RX ADMIN — RANOLAZINE 500 MILLIGRAM(S): 500 TABLET, FILM COATED, EXTENDED RELEASE ORAL at 18:12

## 2024-01-24 RX ADMIN — Medication 1 PACKET(S): at 05:13

## 2024-01-24 RX ADMIN — Medication 3 MILLILITER(S): at 10:25

## 2024-01-24 RX ADMIN — PANTOPRAZOLE SODIUM 40 MILLIGRAM(S): 20 TABLET, DELAYED RELEASE ORAL at 18:12

## 2024-01-24 RX ADMIN — HUMAN INSULIN 16 UNIT(S): 100 INJECTION, SUSPENSION SUBCUTANEOUS at 05:15

## 2024-01-24 RX ADMIN — ESCITALOPRAM OXALATE 10 MILLIGRAM(S): 10 TABLET, FILM COATED ORAL at 12:33

## 2024-01-24 RX ADMIN — Medication 3 MILLILITER(S): at 21:41

## 2024-01-24 RX ADMIN — Medication 10: at 00:06

## 2024-01-24 RX ADMIN — LIDOCAINE 1 PATCH: 4 CREAM TOPICAL at 09:00

## 2024-01-24 RX ADMIN — SODIUM CHLORIDE 4 MILLILITER(S): 9 INJECTION INTRAMUSCULAR; INTRAVENOUS; SUBCUTANEOUS at 15:38

## 2024-01-24 RX ADMIN — LIDOCAINE 1 PATCH: 4 CREAM TOPICAL at 20:02

## 2024-01-24 RX ADMIN — Medication 6: at 05:15

## 2024-01-24 RX ADMIN — CHLORHEXIDINE GLUCONATE 1 APPLICATION(S): 213 SOLUTION TOPICAL at 12:36

## 2024-01-24 RX ADMIN — Medication 1 GRAM(S): at 05:54

## 2024-01-24 RX ADMIN — PIPERACILLIN AND TAZOBACTAM 25 GRAM(S): 4; .5 INJECTION, POWDER, LYOPHILIZED, FOR SOLUTION INTRAVENOUS at 05:14

## 2024-01-24 RX ADMIN — HEPARIN SODIUM 5000 UNIT(S): 5000 INJECTION INTRAVENOUS; SUBCUTANEOUS at 22:24

## 2024-01-24 RX ADMIN — HEPARIN SODIUM 5000 UNIT(S): 5000 INJECTION INTRAVENOUS; SUBCUTANEOUS at 15:07

## 2024-01-24 RX ADMIN — Medication 650 MILLIGRAM(S): at 13:00

## 2024-01-24 RX ADMIN — HEPARIN SODIUM 5000 UNIT(S): 5000 INJECTION INTRAVENOUS; SUBCUTANEOUS at 05:12

## 2024-01-24 RX ADMIN — HUMAN INSULIN 16 UNIT(S): 100 INJECTION, SUSPENSION SUBCUTANEOUS at 18:10

## 2024-01-24 RX ADMIN — AMLODIPINE BESYLATE 5 MILLIGRAM(S): 2.5 TABLET ORAL at 05:14

## 2024-01-24 RX ADMIN — HUMAN INSULIN 16 UNIT(S): 100 INJECTION, SUSPENSION SUBCUTANEOUS at 00:06

## 2024-01-24 RX ADMIN — SODIUM CHLORIDE 4 MILLILITER(S): 9 INJECTION INTRAMUSCULAR; INTRAVENOUS; SUBCUTANEOUS at 10:25

## 2024-01-24 RX ADMIN — SODIUM CHLORIDE 4 MILLILITER(S): 9 INJECTION INTRAMUSCULAR; INTRAVENOUS; SUBCUTANEOUS at 03:41

## 2024-01-24 RX ADMIN — PANTOPRAZOLE SODIUM 40 MILLIGRAM(S): 20 TABLET, DELAYED RELEASE ORAL at 05:14

## 2024-01-24 RX ADMIN — TICAGRELOR 60 MILLIGRAM(S): 90 TABLET ORAL at 05:13

## 2024-01-24 RX ADMIN — Medication 1 GRAM(S): at 18:12

## 2024-01-24 RX ADMIN — HUMAN INSULIN 16 UNIT(S): 100 INJECTION, SUSPENSION SUBCUTANEOUS at 12:34

## 2024-01-24 RX ADMIN — Medication 4: at 12:35

## 2024-01-24 RX ADMIN — Medication 3 MILLILITER(S): at 15:37

## 2024-01-24 RX ADMIN — CHLORHEXIDINE GLUCONATE 15 MILLILITER(S): 213 SOLUTION TOPICAL at 18:12

## 2024-01-24 RX ADMIN — PIPERACILLIN AND TAZOBACTAM 25 GRAM(S): 4; .5 INJECTION, POWDER, LYOPHILIZED, FOR SOLUTION INTRAVENOUS at 15:07

## 2024-01-24 RX ADMIN — Medication 4: at 18:11

## 2024-01-24 RX ADMIN — Medication 3 MILLILITER(S): at 03:41

## 2024-01-24 RX ADMIN — Medication 40 MILLIGRAM(S): at 05:14

## 2024-01-24 RX ADMIN — Medication 650 MILLIGRAM(S): at 12:33

## 2024-01-24 RX ADMIN — LEVETIRACETAM 400 MILLIGRAM(S): 250 TABLET, FILM COATED ORAL at 18:13

## 2024-01-24 NOTE — PROGRESS NOTE ADULT - ATTENDING COMMENTS
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    #Acute hypoxemic respiratory failure  #Staph Aureus Pneumonia  #Dysphagia  - c/w sedation for now, will lighten as tolerated, may need EEG  - c/w full vent support for MSSA pna and aspiration pneumonia, PS trials as tolerated  - c/w tube feeds   - c/w zosyn, f/u bronch cultures  - spoke with vascular (still awaiting cardiology call back, multiple attempts made with no response); okay to stop Brilinta from vascular standpoint for possible procedure; will stop Brilinta given family concern for need for thora (cardiac stents >1 year ago); of note, I do not think bilateral thoracentesis will help prevent recurrent aspiration pneumonias, which is why he was reintubated  - start weaning steroids tomorrow (started on solumedrol for aspiration pneumonia as per multiple family requests; risks/benefits explained to them and they would still want it)  - ID consult as per family request

## 2024-01-24 NOTE — PHYSICAL THERAPY INITIAL EVALUATION ADULT - PRECAUTIONS/LIMITATIONS, REHAB EVAL
Recd fax requesting refill for Premarin from a [place] pharmacy.   no known precautions/limitations

## 2024-01-24 NOTE — PROGRESS NOTE ADULT - ASSESSMENT
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 Patient states that he has been having a dry cough for the past week, worsened over the past few days. Denies SOB with the cough, denies hemoptysis. Reports fevers at home to 101.5 with associated diaphoresis. Said that he has significant pinprick like b/l chest pain with his cough but denies any chest pain when not coughing or with ambulation. Also reports rhinorrhea and sore throat. Reports generalized weakness and poor appetite. States his daughter was visiting him over the week end last week and she was positive for COVID19. Patient tested positive for COVID19 2 days prior and was started on paxlovid as outpatient. Of note, family states that the patient has had worsening confusion at home over the past 6 months (becoming disoriented to time) but recently has been more confused, talking about seeing people that are not present.   On arrival to the ED his vitals were T 98 -> 99.2, P 80, /72, RR 18, ) sat 100% RA. His lab work showed leukocytosis with neutrophilia and bandemia, MABLE, mild hyponatremia, indeterminant but stable troponins, and elevated lactate to 2.3. His COVID19 swab was positive. His CXR showed bibasilar crackles.  (23 Dec 2023 22:51):  pt has been here for past two weeks and now pulm called fro excessive secretions   he is able to answer simple questions:  grand sons at bedside:     Covid / Resp failure/on 2 L of oxygen  :  CADS/P CABG  DM  CKD  HTN  CVA X 3    Covid / Resp failure/on 2 L of oxygen:  -he was treated for covid before:   -he has excessive secretions  -keep o2 sao2 above 90%  -add BD and mucomyst: ;  -add mucinex:   1/10: he seems to be doing  ok: cont mucomyst and bd   1/11 ?: add benzonatate and Robitussin prm : dc dornase  1/12: seems OK: no sob:  no cough : no phlegm : has gurgling sound in throat:  looks comfortable  1/14: he is on room air:  with good sao2:  finished covid rx:  cont current rx:  high risk for aspiration as he has gurgling secretions in throat   1/15: would do ct chest he seems to have increasing effusions:  his ct scan from last week of december:  has not much of effusions: however will try lasix : [facility]    1/16: doing  ok : no os:b has secretions still : change to percussion bed:  cont bd  1/17: seems stable:  no sob: on 3 L of oxygen : should add ATC lasix to me as he has formed new pleural effusions:  echo with mod AS   1/18; [facility] pts son : who is a neurologist:  he has secretions in chest with atelectasis and yoavni effusions with increased secretions:  the son requests bronchoscopy:  I have explained the advantages and disadvantages of the bronch at this age and he might not be able to be extubated soon after procedure:  but they want to go ahead with it: IP called   He will remain art risk for recurrent aspiration and atelectasis even after the procedure and they understand that    1/19: FOR BRONCH TODAY  : AGAIN CONFIRMED WITH NEUROLOGISTS SON  1/20 : events noted:  was successful extubated after the procedure:  but then he desaturated with increasing secretions and ended up on high flow and adm to MICU : on recent chest xray has mod large effusion on rigth side:  I think it needs a tap:  would defer [facility]   [facility]  1/21: dw fellow  consider tapping on rigth saide:  his PCP and son at bedside:  he is not obviously sob but still on 100%  high flow   1/22: he is doing poorly:  WBC increased:  bronch culturs with staph : nares with staph ? add vanco:  defer to MICU : in addition:  he has large effusion 0on rigth side: ? chest tube:  but brillinta needs to be stopped : [facility] attending  1/23: got intubated : sedated on vasopressors:  s/p brocnh : [facility] attending again  : possibly needs tap on rigth side   1/24: cont current rx:  defer to primary team  : s/p bronch   -cont current medications   DM  monitor and control   CKD  -cont current meds   HTN   -controlled  CVA X 3  -doing ok :     dw family  and team  GI Bleed:   -secondary to Dieulafoy's lesion:  defer to primary team :    dvt prophylaxis    [facility] ATTENDING/; [facility]

## 2024-01-24 NOTE — CONSULT NOTE ADULT - ASSESSMENT
89 year old Male with PMHx of CAD s/p CABG s/p stents (last stent May 2022), s/p PPM, DM2, CKD (baseline Cr 1.3), PVD, HTN, HLD, CVA x3, and Myoclonic Jerks who presented on 12/23 to the hospital for chest pain, cough , fever, Covid 2 days prior to arrival, started on Paxlovid.    -Fever, leukocytosis, bandemia   -Covid +, Remdesivir hel 2/2 to MABLE, nephrology following     -CT C/A/P (12/24): Distended gallbladder with stones and gallbladder wall thickening as well as mural edema concerning for acute cholecystitis. The surrounding inflammatory changes extend to the hepatic flexure of the colon, secondary to the adjacent inflamed gallbladder versus mild colitis. Ileus, no obstruction. SMA with focal stenosis but no   occlusion.    -On 12/24: Ex lap, mesenteric angiogram, and superior mesenteric artery stent.    -On 12/26: UD RUQ: Distended gallbladder with cholelithiasis and sludge, and top normal wall thickness. No focal pain was elicited over the gallbladder during the exam.  -On 12/29: Nonvisualization of the gallbladder. In the proper clinical setting, findings are compatible with acute cholecystitis. To increase the specificity of this finding, the study may be repeated with Morphine, if necessary.    -Not a surgical candidate, Antibiotics continued   -On 1/1: Acute blood loss anemia with Hb drop and hematemesis, PRBC transfused and GI consulted  -On 1/2: S/p EGD with clipping of bleeding gastric body ulcers  -On 1/5: transferred to floor   -On 1/10: LUE venous duplex: No evidence of deep vein thrombosis in the left upper extremity. Superficial vein thrombosis is visualized in the left cephalic vein at the antecubital fossa. Complex, avascular fluid collection is noted at the upper arm measuring ~ 5.3 x 2.7 x 3.7 cm. Differential includes hematoma."    -On 1/16: AMS, CT head: Partial opacification of the left middle ear and mastoid air cells.   Correlate clinically for otomastoiditis. --> ENT no clinical evidence of mastoiditis   -EEG -no epileptic activities   -CT chest on 1/16 :New small bilateral pleural effusions with associated complete collapse of the right middle and right lower lobes and partial atelectasis of the right upper and left lower lobes.  Patchy bilateral groundglass opacities are consistent with pulmonary edema.  Layering secretions in the trachea, left mainstem bronchus, and right lower lobe bronchus.    -On 1/19 Bronchoscopy with mucus aspiration and lavage   -RR post procedure for desaturation, placed on HFNC and transferred to MICU   -CXR: moderate R pleural effusion     -On 1/22: intubated and bronchoscopy     -CXR 1/22: Follow-up with endotracheal tube in place, right effusion and bilateral opacities consistent with pulmonary edema.    Workup:  Covid on 12/23  Blood Cx (12/24, 1/20, 1/22): NGTD  Bronch Cx (119): MSSA, moderate yeast   Legionella urine Ag neg     Spiking fevers on 1/19 and again 1/23  Leukocytosis increased on 1/20 8-->16-->21-->16    Antimicrobials:   Remdesivir 12/25-->12/26  Zosyn 12/25, 12/31, 1/20-->  Cefepime 1/19-->1/20    #Acute hypoxic respiratory failure, R pleural effusion, pulmonary edema, pneumonia   #Fever, leukocytosis   #MABLE on CKD       PT TO BE SEEN. PRELIM NOTE  PENDING RECS. PLEASE WAIT FOR FINAL RECS AFTER DISCUSSION WITH ATTENDING#                        89 year old Male with PMHx of CAD s/p CABG s/p stents (last stent May 2022), s/p PPM, DM2, CKD (baseline Cr 1.3), PVD, HTN, HLD, CVA x3, and Myoclonic Jerks who presented on 12/23 to the hospital for chest pain, cough , fever, Covid 2 days prior to arrival, started on Paxlovid.    -Fever, leukocytosis, bandemia   -Covid +, Remdesivir hel 2/2 to MABLE, nephrology following     -CT C/A/P (12/24): Distended gallbladder with stones and gallbladder wall thickening as well as mural edema concerning for acute cholecystitis. The surrounding inflammatory changes extend to the hepatic flexure of the colon, secondary to the adjacent inflamed gallbladder versus mild colitis. Ileus, no obstruction. SMA with focal stenosis but no   occlusion.    -On 12/24: Ex lap, mesenteric angiogram, and superior mesenteric artery stent.    -On 12/26: UD RUQ: Distended gallbladder with cholelithiasis and sludge, and top normal wall thickness. No focal pain was elicited over the gallbladder during the exam.  -On 12/29: Nonvisualization of the gallbladder. In the proper clinical setting, findings are compatible with acute cholecystitis. To increase the specificity of this finding, the study may be repeated with Morphine, if necessary.    -Not a surgical candidate, Antibiotics continued   -On 1/1: Acute blood loss anemia with Hb drop and hematemesis, PRBC transfused and GI consulted  -On 1/2: S/p EGD with clipping of bleeding gastric body ulcers  -On 1/5: transferred to floor   -On 1/10: LUE venous duplex: No evidence of deep vein thrombosis in the left upper extremity. Superficial vein thrombosis is visualized in the left cephalic vein at the antecubital fossa. Complex, avascular fluid collection is noted at the upper arm measuring ~ 5.3 x 2.7 x 3.7 cm. Differential includes hematoma."    -On 1/16: AMS, CT head: Partial opacification of the left middle ear and mastoid air cells.   Correlate clinically for otomastoiditis. --> ENT no clinical evidence of mastoiditis   -EEG -no epileptic activities   -CT chest on 1/16 :New small bilateral pleural effusions with associated complete collapse of the right middle and right lower lobes and partial atelectasis of the right upper and left lower lobes.  Patchy bilateral groundglass opacities are consistent with pulmonary edema.  Layering secretions in the trachea, left mainstem bronchus, and right lower lobe bronchus.    -On 1/19 Bronchoscopy with mucus aspiration and lavage   -RR post procedure for desaturation, placed on HFNC and transferred to MICU   -CXR: moderate R pleural effusion     -On 1/22: intubated and bronchoscopy     -CXR 1/22: Follow-up with endotracheal tube in place, right effusion and bilateral opacities consistent with pulmonary edema.    Workup:  Covid on 12/23  Blood Cx (12/24, 1/20, 1/22): NGTD  Bronch Cx (119): MSSA, moderate yeast   Legionella urine Ag neg     Spiking fevers on 1/19 and again 1/23  Leukocytosis increased on 1/20 8-->16-->21-->16    Antimicrobials:   Remdesivir 12/25-->12/26  Zosyn 12/25, 12/31, 1/20-->  Cefepime 1/19-->1/20    #Acute hypoxic respiratory failure, R pleural effusion, pulmonary edema, Covid pneumonia with superimposed MSSA pneumonia, now with fevers ? microaspirations r/o other etiologies    #SMA thrombosis s/p stent   #Fever, leukocytosis   #MABLE on CKD     Recommendations:  -Continue Zosyn 3.375 g IV q 8hrs   -Get a new RVP   -Send blood Cx x2   -Get CT maxillofacial and CT AP with cont ( if no contraindication) to r/o any new focus of infection   -Get repeat LUE US for evaluation of LUE hematoma   -Monitor T curve and WBC trend     Seen and discussed with Dr Leticia Dugan MD, PGY5  ID fellow  [employer] Teams Preferred  After 5pm/weekends call [phone]

## 2024-01-24 NOTE — PROGRESS NOTE ADULT - ASSESSMENT
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) who presents to the hospital for COVID19 infection and chest pain  MABLE ----improving   Hypophosphatemia  Hypokalemia - improving sp repletion  Hypertensive urgency -resolved --- BP meds as ordered  Pulm - resp failure - intubated    1 Renal - crt stable, s/p lasix 40mg IV yesterday  hypervolumic   a/w phosnak 1packet x 3 doses   4 CV - BP controlled   5 Vasc - s/p SMA stent placement;   6 Sx - s/p HIDA + for acute asa, medical management, on puree diet , encourage free water in take too (if allowed  with aspiration precautions)   7 GI - s/p EGD clipping of bleeding duodenal ulcers   8 Anemia- hgb stable   9 Pul-chest PT, sp bronch , b/l moderated effusion with atelectasis and ground glass opacities   CXR with b/l effusion - intubated       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) who presents to the hospital for COVID19 infection and chest pain  MABLE ----improving   Hypophosphatemia  Hypokalemia - improving sp repletion  Hypertensive urgency -resolved --- BP meds as ordered  Pulm - resp failure - intubated  EEG pending     1 Renal - crt stable, s/p lasix 40mg IV yesterday  hypervolumic   a/w phosnak 1packet x 3 doses   4 CV - BP controlled   5 Vasc - s/p SMA stent placement;   6 Sx - s/p HIDA + for acute asa, medical management, on puree diet , encourage free water in take too (if allowed  with aspiration precautions)   7 GI - s/p EGD clipping of bleeding duodenal ulcers   8 Anemia- hgb stable   9 Pul-chest PT, sp bronch , b/l moderated effusion with atelectasis and ground glass opacities   CXR with b/l effusion - intubated    10 ID follow up

## 2024-01-24 NOTE — PROGRESS NOTE ADULT - SUBJECTIVE AND OBJECTIVE BOX
NEPHROLOGY     Patient seen and examined intubated, off sedation.     MEDICATIONS  (STANDING):  albuterol/ipratropium for Nebulization 3 milliLiter(s) Nebulizer every 6 hours  amLODIPine   Tablet 5 milliGRAM(s) Oral daily  aspirin  chewable 81 milliGRAM(s) Oral daily  chlorhexidine 0.12% Liquid 15 milliLiter(s) Oral Mucosa every 12 hours  chlorhexidine 2% Cloths 1 Application(s) Topical daily  dextrose 5%. 1000 milliLiter(s) (100 mL/Hr) IV Continuous <Continuous>  dextrose 5%. 1000 milliLiter(s) (50 mL/Hr) IV Continuous <Continuous>  dextrose 50% Injectable 12.5 Gram(s) IV Push once  dextrose 50% Injectable 25 Gram(s) IV Push once  dextrose 50% Injectable 25 Gram(s) IV Push once  escitalopram 10 milliGRAM(s) Oral daily  glucagon  Injectable 1 milliGRAM(s) IntraMuscular once  heparin   Injectable 5000 Unit(s) SubCutaneous every 8 hours  insulin lispro (ADMELOG) corrective regimen sliding scale   SubCutaneous every 6 hours  insulin NPH human recombinant 16 Unit(s) SubCutaneous every 6 hours  levETIRAcetam  IVPB 250 milliGRAM(s) IV Intermittent every 12 hours  lidocaine   4% Patch 1 Patch Transdermal every 24 hours  methylPREDNISolone sodium succinate Injectable 40 milliGRAM(s) IV Push daily  pantoprazole  Injectable 40 milliGRAM(s) IV Push every 12 hours  piperacillin/tazobactam IVPB.. 3.375 Gram(s) IV Intermittent every 8 hours  polyethylene glycol 3350 17 Gram(s) Oral daily  potassium phosphate / sodium phosphate Powder (PHOS-NaK) 1 Packet(s) Oral three times a day  ranolazine 500 milliGRAM(s) Oral two times a day  senna 2 Tablet(s) Oral at bedtime  sodium chloride 3%  Inhalation 4 milliLiter(s) Inhalation every 6 hours  sucralfate suspension 1 Gram(s) Oral two times a day    VITALS:  T(C): , Max: 38.6 (01-24-24 @ 08:00)  T(F): , Max: 101.4 (01-24-24 @ 08:00)  HR: 109 (01-24-24 @ 11:28)  BP: 140/57 (01-24-24 @ 11:00)  BP(mean): 77 (01-24-24 @ 11:00)  RR: 23 (01-24-24 @ 11:00)  SpO2: 99% (01-24-24 @ 11:28)    I and O's:    01-23 @ 07:01  -  01-24 @ 07:00  --------------------------------------------------------  IN: 1492.3 mL / OUT: 2460 mL / NET: -967.7 mL    PHYSICAL EXAM:  Constitutional: intubated  Neck: No JVD  Respiratory: diminished bs   Cardiovascular: tachy   Gastrointestinal: BS+, soft, NT/ND  Extremities: + le edema   Neurological: uto  : no delong      LABS:                        8.2    16.40 )-----------( 305      ( 24 Jan 2024 00:15 )             25.4     01-24    141  |  100  |  31<H>  ----------------------------<  398<H>  3.8   |  29  |  1.66<H>    Ca    8.2<L>      24 Jan 2024 00:15  Phos  1.7     01-24  Mg     2.00     01-24    TPro  6.5  /  Alb  2.5<L>  /  TBili  0.4  /  DBili  x   /  AST  17  /  ALT  16  /  AlkPhos  104  01-24     NEPHROLOGY     Patient seen and examined with family at bedside, intubated, off sedation, febrile.     MEDICATIONS  (STANDING):  albuterol/ipratropium for Nebulization 3 milliLiter(s) Nebulizer every 6 hours  amLODIPine   Tablet 5 milliGRAM(s) Oral daily  aspirin  chewable 81 milliGRAM(s) Oral daily  chlorhexidine 0.12% Liquid 15 milliLiter(s) Oral Mucosa every 12 hours  chlorhexidine 2% Cloths 1 Application(s) Topical daily  dextrose 5%. 1000 milliLiter(s) (100 mL/Hr) IV Continuous <Continuous>  dextrose 5%. 1000 milliLiter(s) (50 mL/Hr) IV Continuous <Continuous>  dextrose 50% Injectable 12.5 Gram(s) IV Push once  dextrose 50% Injectable 25 Gram(s) IV Push once  dextrose 50% Injectable 25 Gram(s) IV Push once  escitalopram 10 milliGRAM(s) Oral daily  glucagon  Injectable 1 milliGRAM(s) IntraMuscular once  heparin   Injectable 5000 Unit(s) SubCutaneous every 8 hours  insulin lispro (ADMELOG) corrective regimen sliding scale   SubCutaneous every 6 hours  insulin NPH human recombinant 16 Unit(s) SubCutaneous every 6 hours  levETIRAcetam  IVPB 250 milliGRAM(s) IV Intermittent every 12 hours  lidocaine   4% Patch 1 Patch Transdermal every 24 hours  methylPREDNISolone sodium succinate Injectable 40 milliGRAM(s) IV Push daily  pantoprazole  Injectable 40 milliGRAM(s) IV Push every 12 hours  piperacillin/tazobactam IVPB.. 3.375 Gram(s) IV Intermittent every 8 hours  polyethylene glycol 3350 17 Gram(s) Oral daily  potassium phosphate / sodium phosphate Powder (PHOS-NaK) 1 Packet(s) Oral three times a day  ranolazine 500 milliGRAM(s) Oral two times a day  senna 2 Tablet(s) Oral at bedtime  sodium chloride 3%  Inhalation 4 milliLiter(s) Inhalation every 6 hours  sucralfate suspension 1 Gram(s) Oral two times a day    VITALS:  T(C): , Max: 38.6 (01-24-24 @ 08:00)  T(F): , Max: 101.4 (01-24-24 @ 08:00)  HR: 109 (01-24-24 @ 11:28)  BP: 140/57 (01-24-24 @ 11:00)  BP(mean): 77 (01-24-24 @ 11:00)  RR: 23 (01-24-24 @ 11:00)  SpO2: 99% (01-24-24 @ 11:28)    I and O's:    01-23 @ 07:01  -  01-24 @ 07:00  --------------------------------------------------------  IN: 1492.3 mL / OUT: 2460 mL / NET: -967.7 mL    PHYSICAL EXAM:  Constitutional: intubated  Neck: No JVD  Respiratory: diminished bs   Cardiovascular: tachy   Gastrointestinal: BS+, soft, NT/ND  Extremities: + le edema   Neurological: uto  : no delong      LABS:                        8.2    16.40 )-----------( 305      ( 24 Jan 2024 00:15 )             25.4     01-24    141  |  100  |  31<H>  ----------------------------<  398<H>  3.8   |  29  |  1.66<H>    Ca    8.2<L>      24 Jan 2024 00:15  Phos  1.7     01-24  Mg     2.00     01-24    TPro  6.5  /  Alb  2.5<L>  /  TBili  0.4  /  DBili  x   /  AST  17  /  ALT  16  /  AlkPhos  104  01-24

## 2024-01-24 NOTE — CONSULT NOTE ADULT - ATTENDING COMMENTS
89-yo M w/ PMH of CAD s/p CABG w/ stents, s/p PPM, DM, PVD, CVA, CKD, and myoclonic jerks, admitted on 12/23 i/s/o recent COVID. Partially treated for COVID w/ Paxlovid. Course complicated by CT C/A/P revealing findings suggestive of acute cholecystitis (12/24/23). 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 S/p exlap, mesenteric angiogram, and SMA stent (12/24). HIDA (12/29) supported the diagnosis of acute cholecystitis. Treated medically (Zosyn 12/24-31). Further c/b acute blood loss anemia 1/1, s/p pRBC. EGD on 1/2 w/ clipping. LUE hematoma noted 1/10 on venous Duplex, w/ visualization of complex, avascular fluid collection (5.3 x 2.7 x 3.7 cm), possibly hematoma. AMS noted on 1/16, w/ CT head finding suggestive of partial opacification of the L middle ear and mastoid air cells. CT chest on 1/16 revealed new small b/l pleural effusions a/w RML and RLL collapse. Patchy b/l GGO, c/f pulm edema. S/p BAL 1/19, revealing NRF w/ Staph aureus. Restarted on Zosyn 1/20. Repeat BAL 1/22 w/ no growth.       Workup:  Covid on 12/23  Blood Cx (12/24, 1/20, 1/22): NGTD  Bronch Cx (119): MSSA, moderate yeast   Legionella urine Ag neg     Spiking fevers on 1/19 and again 1/23  Leukocytosis increased on 1/20 8-->16-->21-->16    Antimicrobials:   Remdesivir 12/25-26  Zosyn 12/25-31, 1/20-->  Cefepime 1/19-20    #Acute hypoxic respiratory failure  #Pleural effusion  #Fever  #Leukocytosis  #MABLE on CKD  #Abnormal CT of head  #Conjunctival hemorrhage  #Hematoma  #Toxic metabolic encephalitis  - AMS and acute hypoxic resp failure, intubated. BAL 1/19 w/ S aureus on Zosyn, now repeat BAL 1/22 w/ no growth  - Recent COVID. Superimposed infection can be within differential.  - CT max/face and CTAP w/ contrast to assess for infectious foci, especially w/ the new L conjunctival hemorrhage.  - BCx x2 sets  - Send serum Fungitell, galactomannan.  - Image the hematoma of LUE  - Continue with Zosyn at this time  - Would d/c steroids  - Monitor fever curve and WBC. Wean off oxygen per MICU management.    Plan discussed with MICU attending Dr. Wolfe and ID fellow.  Thank you for this consult. Inpatient ID team will follow.    Cheo Kelly MD, PhD  Attending Physician  Division of Infectious Diseases  Department of Medicine    Please contact through MS Teams message.  Office: [phone] (after 5 PM or weekend)

## 2024-01-24 NOTE — PROGRESS NOTE ADULT - SUBJECTIVE AND OBJECTIVE BOX
Date of Service: 01-24-24 @ 11:54    Patient is a 89y old  Male who presents with a chief complaint of COVID19, Chest pain (24 Jan 2024 07:08)      Any change in ROS:  doing  ok : no sob:  intubated ;on 40% fio2: :  sedated     MEDICATIONS  (STANDING):  albuterol/ipratropium for Nebulization 3 milliLiter(s) Nebulizer every 6 hours  amLODIPine   Tablet 5 milliGRAM(s) Oral daily  aspirin  chewable 81 milliGRAM(s) Oral daily  chlorhexidine 0.12% Liquid 15 milliLiter(s) Oral Mucosa every 12 hours  chlorhexidine 2% Cloths 1 Application(s) Topical daily  dextrose 5%. 1000 milliLiter(s) (100 mL/Hr) IV Continuous <Continuous>  dextrose 5%. 1000 milliLiter(s) (50 mL/Hr) IV Continuous <Continuous>  dextrose 50% Injectable 25 Gram(s) IV Push once  dextrose 50% Injectable 25 Gram(s) IV Push once  dextrose 50% Injectable 12.5 Gram(s) IV Push once  escitalopram 10 milliGRAM(s) Oral daily  glucagon  Injectable 1 milliGRAM(s) IntraMuscular once  heparin   Injectable 5000 Unit(s) SubCutaneous every 8 hours  insulin lispro (ADMELOG) corrective regimen sliding scale   SubCutaneous every 6 hours  insulin NPH human recombinant 16 Unit(s) SubCutaneous every 6 hours  levETIRAcetam  IVPB 250 milliGRAM(s) IV Intermittent every 12 hours  lidocaine   4% Patch 1 Patch Transdermal every 24 hours  methylPREDNISolone sodium succinate Injectable 40 milliGRAM(s) IV Push daily  pantoprazole  Injectable 40 milliGRAM(s) IV Push every 12 hours  piperacillin/tazobactam IVPB.. 3.375 Gram(s) IV Intermittent every 8 hours  polyethylene glycol 3350 17 Gram(s) Oral daily  potassium phosphate / sodium phosphate Powder (PHOS-NaK) 1 Packet(s) Oral three times a day  ranolazine 500 milliGRAM(s) Oral two times a day  senna 2 Tablet(s) Oral at bedtime  sodium chloride 3%  Inhalation 4 milliLiter(s) Inhalation every 6 hours  sucralfate suspension 1 Gram(s) Oral two times a day    MEDICATIONS  (PRN):  acetaminophen     Tablet .. 650 milliGRAM(s) Oral every 6 hours PRN Temp greater or equal to 38C (100.4F), Mild Pain (1 - 3), Moderate Pain (4 - 6)  dextrose Oral Gel 15 Gram(s) Oral once PRN Blood Glucose LESS THAN 70 milliGRAM(s)/deciliter  guaiFENesin Oral Liquid (Sugar-Free) 100 milliGRAM(s) Oral every 6 hours PRN Cough    Vital Signs Last 24 Hrs  T(C): 38.6 (24 Jan 2024 08:00), Max: 38.6 (24 Jan 2024 08:00)  T(F): 101.4 (24 Jan 2024 08:00), Max: 101.4 (24 Jan 2024 08:00)  HR: 109 (24 Jan 2024 11:28) (65 - 118)  BP: 140/57 (24 Jan 2024 11:00) (121/36 - 154/59)  BP(mean): 77 (24 Jan 2024 11:00) (57 - 88)  RR: 23 (24 Jan 2024 11:00) (15 - 27)  SpO2: 99% (24 Jan 2024 11:28) (95% - 100%)    Parameters below as of 24 Jan 2024 10:00      O2 Concentration (%): 40  Mode: AC/ CMV (Assist Control/ Continuous Mandatory Ventilation)  RR (machine): 20  TV (machine): 400  FiO2: 40  PEEP: 8  ITime: 0.75  MAP: 15  PIP: 29    I&O's Summary    23 Jan 2024 07:01  -  24 Jan 2024 07:00  --------------------------------------------------------  IN: 1492.3 mL / OUT: 2460 mL / NET: -967.7 mL    24 Jan 2024 07:01  -  24 Jan 2024 11:54  --------------------------------------------------------  IN: 160 mL / OUT: 80 mL / NET: 80 mL          Physical Exam:   GENERAL: NAD, well-groomed, well-developed  HEENT: JAVAD/   Atraumatic, Normocephalic  ENMT: No tonsillar erythema, exudates, or enlargement; Moist mucous membranes, Good dentition, No lesions  NECK: Supple, No JVD, Normal thyroid  CHEST/LUNG: decreased air entry rigth side;   CVS: Regular rate and rhythm; No murmurs, rubs, or gallops  GI: : Soft, Nontender, Nondistended; Bowel sounds present  NERVOUS SYSTEM:  intubated and sadted  EXTREMITIES:  - edema  LYMPH: No lymphadenopathy noted  SKIN: No rashes or lesions  ENDOCRINOLOGY: No Thyromegaly  PSYCH: sedated    Labs:  ABG - ( 23 Jan 2024 00:40 )  pH, Arterial: 7.49  pH, Blood: x     /  pCO2: 37    /  pO2: 92    / HCO3: 28    / Base Excess: 4.6   /  SaO2: 98.6            32, 28                            8.2    16.40 )-----------( 305      ( 24 Jan 2024 00:15 )             25.4                         8.8    21.23 )-----------( 332      ( 23 Jan 2024 00:40 )             27.4                         8.7    21.18 )-----------( 298      ( 22 Jan 2024 03:53 )             26.6                         8.4    17.58 )-----------( 274      ( 21 Jan 2024 04:05 )             26.1     01-24    141  |  100  |  31<H>  ----------------------------<  398<H>  3.8   |  29  |  1.66<H>  01-23    141  |  101  |  28<H>  ----------------------------<  427<H>  3.1<L>   |  29  |  1.75<H>  01-23    139  |  97<L>  |  26<H>  ----------------------------<  329<H>  3.0<L>   |  25  |  1.79<H>  01-22    143  |  112<H>  |  25<H>  ----------------------------<  293<H>  4.7   |  21<L>  |  1.04  01-22    139  |  97<L>  |  24<H>  ----------------------------<  240<H>  3.4<L>   |  25  |  1.79<H>  01-21    138  |  99  |  23  ----------------------------<  223<H>  4.8   |  24  |  1.76<H>  01-21    138  |  100  |  20  ----------------------------<  169<H>  3.2<L>   |  25  |  1.72<H>    Ca    8.2<L>      24 Jan 2024 00:15  Ca    8.1<L>      23 Jan 2024 16:08  Ca    8.4      23 Jan 2024 00:40  Ca    8.6      22 Jan 2024 17:47  Phos  1.7     01-24  Phos  1.8     01-23  Phos  1.9     01-23  Phos  2.1     01-22  Mg     2.00     01-24  Mg     2.00     01-23  Mg     2.00     01-23  Mg     2.20     01-22    TPro  6.5  /  Alb  2.5<L>  /  TBili  0.4  /  DBili  x   /  AST  17  /  ALT  16  /  AlkPhos  104  01-24  TPro  6.6  /  Alb  2.3<L>  /  TBili  0.4  /  DBili  x   /  AST  17  /  ALT  17  /  AlkPhos  103  01-23  TPro  7.1  /  Alb  2.7<L>  /  TBili  0.5  /  DBili  x   /  AST  17  /  ALT  21  /  AlkPhos  83  01-23  TPro  6.5  /  Alb  2.3<L>  /  TBili  0.2  /  DBili  x   /  AST  26  /  ALT  13  /  AlkPhos  88  01-22  TPro  7.1  /  Alb  2.6<L>  /  TBili  0.7  /  DBili  x   /  AST  25  /  ALT  22  /  AlkPhos  78  01-22  TPro  6.7  /  Alb  2.6<L>  /  TBili  0.8  /  DBili  x   /  AST  21  /  ALT  17  /  AlkPhos  67  01-21    CAPILLARY BLOOD GLUCOSE      POCT Blood Glucose.: 298 mg/dL (24 Jan 2024 05:11)  POCT Blood Glucose.: 397 mg/dL (23 Jan 2024 23:22)  POCT Blood Glucose.: 460 mg/dL (23 Jan 2024 18:07)     (01-19 @ 11:35)    LIVER FUNCTIONS - ( 24 Jan 2024 00:15 )  Alb: 2.5 g/dL / Pro: 6.5 g/dL / ALK PHOS: 104 U/L / ALT: 16 U/L / AST: 17 U/L / GGT: x           PT/INR - ( 24 Jan 2024 00:15 )   PT: 12.7 sec;   INR: 1.13 ratio         PTT - ( 24 Jan 2024 00:15 )  PTT:34.3 sec  Urinalysis Basic - ( 24 Jan 2024 00:15 )    Color: x / Appearance: x / SG: x / pH: x  Gluc: 398 mg/dL / Ketone: x  / Bili: x / Urobili: x   Blood: x / Protein: x / Nitrite: x   Leuk Esterase: x / RBC: x / WBC x   Sq Epi: x / Non Sq Epi: x / Bacteria: x      Fluid Source --  Albumin, Fluid--  Glucose, Fluid--  Protein total, Fluid--  Lacatate Dehydrogenase, Fluid--  pH, Fluid--  Cytopathology-Non Gyn Report  ACCESSION No:  [id number]  Patient:     SHAIK DONOHUE      Accession:                             [id number]    Collected Date/Time:                   1/19/2024 11:35 EST  Received Date/Time:                    1/19/2024 18:29 EST    Non-Gynecologic Report - Auth (Verified)    Specimen(s) Submitted  LUNG, RIGHT MIDDLE LOBE, BRONCHOALVEOLAR LAVAGE    Final Diagnosis  LUNG, RIGHT MIDDLE LOBE, BRONCHOALVEOLAR LAVAGE  NEGATIVE FOR MALIGNANT CELLS.  Acute inflammation, squamous cells and fungal hyphae/ yeast forms  consistent with Candida species      Cytology slide show reactive ciliated bronchial epithelial cells,  scattered alveolar macrophages and mixed inflammatory cells.  Cell block consists of purulent exudate, squamous cells, fungal yeast/  hyphal forms and yellow refractile material.  Screened by: Jessica GOVEA(ASCP)  Verified by: CHAGO GRACE M.D.  (Electronic Signature)  Reported on: 01/23/24 17:36 EST, [facility], [place] [address]  Phone: [phone]   Fax: [phone]  Cytology processed at [facility], [address],  [place] [address]  _________________________________________________________________      Clinical Information  Mucus plug.    Gross Description  Received: 50 ml of cloudy fluid in CytoLyt  Prepared: 1 ThinPrep slide, 1 Cell block    Comment  This case was reviewed as an intradepartmental consultation with staff  pathologist, who concurs with the diagnosis.        RECENT CULTURES:  01-22 @ 15:05 .Bronchial Bronchial       Moderate polymorphonuclear leukocytes seen per low power field  No squamous epithelial cells per low power field  No organisms seen per oil power field           No growth to date.    01-20 @ 13:59 .Blood Blood-Peripheral                No growth at 72 Hours    01-20 @ 13:49 .Blood Blood-Peripheral                No growth at 72 Hours    01-19 @ 11:35 .Bronchial R MIDDLE LOBE   ALDA    Moderate polymorphonuclear leukocytes per low power field  No squamous epithelial cells per low power field  Numerous Gram Negative Rods per oil power field  Few Gram Positive Cocci in Clusters per oil power field    Staphylococcus aureus  Staphylococcus aureus     Numerous Staphylococcus aureus  Normal Respiratory Aurelia present      rad< from: Xray Chest 1 View- PORTABLE-Routine (Xray Chest 1 View- PORTABLE-Routine .) (01.22.24 @ 17:40) >  [facility]     PROCEDURE DATE:  01/22/2024          INTERPRETATION:  CLINICAL INFORMATION: Intubation    TIME OF EXAMINATION: January 22, 2024 at 5:25 PM    EXAM: Portable chest    FINDINGS:  The endotracheal tube is seen with its tip at the level of the thoracic   inlet above the fox.  Bilateral airspace opacities improved on the right side with a right   effusion and underlying atelectasis. Findings are consistent with   pulmonary edema.  Heart size is stable.  No pneumothorax.  Enteric tube with tip in the stomach.        COMPARISON: January 21        IMPRESSION: Follow-up with endotracheal tube in place, right effusion and   bilateral opacities consistent with pulmonary edema.    --- End of Report ---            AMELIA ANGLIN MD; Attending Radiologist  This document has been electronically signed. Jan 23 2024 10:31AM    < end of copied text >      RESPIRATORY CULTURES:          Studies  Chest X-RAY  CT SCAN Chest   Venous Dopplers: LE:   CT Abdomen  Others

## 2024-01-24 NOTE — PROGRESS NOTE ADULT - ASSESSMENT
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     PLAN  =====Neurologic=====  #CVA  - prior CVA x3 with no residual deficits  #myoclonic jerks  - on Keppra will continue  - holding home lexapro gabapentin and memantine given persistent secretions/ cough   - can restart once enteric access is established  - low dose Seroquel started 1/21     =====Pulmonary=====  #COVID  - s/p paxlovid and 1 dose remdesivir while inpatient  #Pleural effusions b/l  - CT chest from 1/16 showing new small bilateral pleural effusions with associated complete collapse  of the right middle and right lower lobes and partial atelectasis of the  right upper and left lower lobes. patchy bilateral ground-glass opacities are consistent with pulmonary edema. layering secretions in the trachea, left mainstem bronchus, and right lower lobe bronchus  - currently on cefepime and azithro for empiric PNA coverage will switch to zosyn and dc azithro   - s/p bronch for increasing secretions- on Mucomyst, hypertonic saline, albuterol, guaifenesin, mometasone will continue   - bronchial cultures with staph aureus sensitive to bactrim/ Unasyn consider switching?   - currently on high-flow 60% 50L, will wean,  fu ABGs worsening hypercapnia possible intubation pending GOC    =====Cardiovascular=====  Patient does not currently require vasopressors and is normotensive  #HTN  - on home amlodipine and metoprolol, will continue    #CAD  - on Brilinta aspirin and ranolazine continue     =====GI=====  #upper GI bleed  - s/p EGD showing dieulafoy lesion and esophagitis likely 2/2 NGT irritation s/p 2 clips  - on protonix IV BID continue     #Diet  - tube feeds NGT     =====Renal/=====  #Electrolytes  - Maintain K>4, Phos>3, Mag>2, iCal>1  - baseline cr 1.5, at baseline      =====Endocrine=====  #DM2  - on lantus 14units and SSI  - a1c 9.3, glucose wnl inpatient     =====Infectious Disease=====  #COVID   - s/p paxlovid and 1 dose remdesivir  # PNA  - CT chest showing ground glass opacities  - treatment with azithro and cefepime currently- switch to zosyn on 1/20 for aspiration coverage, dc azithro   - f/u urine legionella  - no white count or fever thus far  - f/u bronchial cultures - staph aureus   - f/u blood cultures given fever overnight on 1/20- NGTD     =====Heme/Onc=====  #DVT Ppx  - Lovenox 40mg SQ qd    =====Ethics=====  FULL CODE. 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     PLAN  =====Neurologic=====  #CVA  - prior CVA x3 with no residual deficits  #myoclonic jerks  - on Keppra will continue  - holding home  gabapentin and memantine in setting of somnolence  - continue lexapro      =====Pulmonary=====  #COVID  - s/p paxlovid and 1 dose remdesivir while inpatient  #Pleural effusions b/l  - CT chest from 1/16 showing new small bilateral pleural effusions with associated complete collapse  of the right middle and right lower lobes and partial atelectasis of the  right upper and left lower lobes. patchy bilateral ground-glass opacities are consistent with pulmonary edema. layering secretions in the trachea, left mainstem bronchus, and right lower lobe bronchus  - currently on zosyn for aspiration PNA  - s/p bronch for increasing secretions- on Mucomyst, hypertonic saline, albuterol, guaifenesin, mometasone will continue   - bronchial cultures with staph aureus sensitive to bactrim/ Unasyn consider switching?   - intubated on 1/22 for airway support, currently off sedatives attempt to extubate     =====Cardiovascular=====  Patient does not currently require vasopressors and is normotensive  #HTN  - on home amlodipine continue  - restart home metoprolol as tolerated     #CAD  - on Brilinta aspirin and ranolazine continue   - as per vascular okay to hold Brilinta for possible pleural effusion thoracentesis  - awaiting call back from cardiology regarding Brilinta / last stent 2022     =====GI=====  #upper GI bleed  - s/p EGD showing dieulafoy lesion and esophagitis likely 2/2 NGT irritation s/p 2 clips  - on protonix IV BID continue     #Diet  - tube feeds NGT     =====Renal/=====  #Electrolytes  - Maintain K>4, Phos>3, Mag>2, iCal>1  - baseline cr 1.5, at baseline      =====Endocrine=====  #DM2  - on NPH 16 units q6 and SSI given solumedrol   - a1c 9.3, glucose wnl inpatient     =====Infectious Disease=====  #COVID   - s/p paxlovid and 1 dose remdesivir  # PNA  - CT chest showing ground glass opacities  - treatment with azithro and cefepime currently- switch to zosyn on 1/20 for aspiration coverage, dc azithro   - f/u urine legionella  - no white count or fever thus far  - f/u bronchial cultures - staph aureus   - f/u blood cultures given fever overnight on 1/20- NGTD     =====Heme/Onc=====  #DVT Ppx  - heparin sub-q    =====Ethics=====  FULL CODE.

## 2024-01-24 NOTE — PROGRESS NOTE ADULT - SUBJECTIVE AND OBJECTIVE BOX
Aashish Boyer MD  Interventional Cardiology / Advance Heart Failure and Cardiac Transplant Specialist  [place] Office : [phone] [place] [address] [place] [address]  Tel:   [place] Office : [phone] [facility] [place] [address]  Tel: [phone]       Pt is lying in bed intubated   	  MEDICATIONS:  amLODIPine   Tablet 5 milliGRAM(s) Oral daily  aspirin  chewable 81 milliGRAM(s) Enteral Tube daily  heparin   Injectable 5000 Unit(s) SubCutaneous every 8 hours    piperacillin/tazobactam IVPB.. 3.375 Gram(s) IV Intermittent every 8 hours    albuterol/ipratropium for Nebulization 3 milliLiter(s) Nebulizer every 6 hours  sodium chloride 3%  Inhalation 4 milliLiter(s) Inhalation every 6 hours    escitalopram 10 milliGRAM(s) Oral daily  levETIRAcetam  IVPB 250 milliGRAM(s) IV Intermittent every 12 hours    pantoprazole  Injectable 40 milliGRAM(s) IV Push every 12 hours  polyethylene glycol 3350 17 Gram(s) Oral daily  senna Syrup 10 milliLiter(s) Oral at bedtime  sucralfate suspension 1 Gram(s) Enteral Tube two times a day    dextrose 50% Injectable 25 Gram(s) IV Push once  dextrose 50% Injectable 25 Gram(s) IV Push once  dextrose 50% Injectable 12.5 Gram(s) IV Push once  dextrose Oral Gel 15 Gram(s) Oral once PRN  glucagon  Injectable 1 milliGRAM(s) IntraMuscular once  insulin lispro (ADMELOG) corrective regimen sliding scale   SubCutaneous every 6 hours  insulin NPH human recombinant 16 Unit(s) SubCutaneous every 6 hours  methylPREDNISolone sodium succinate Injectable 40 milliGRAM(s) IV Push daily    chlorhexidine 0.12% Liquid 15 milliLiter(s) Oral Mucosa every 12 hours  chlorhexidine 2% Cloths 1 Application(s) Topical daily  dextrose 5%. 1000 milliLiter(s) IV Continuous <Continuous>  dextrose 5%. 1000 milliLiter(s) IV Continuous <Continuous>  lidocaine   4% Patch 1 Patch Transdermal every 24 hours      PAST MEDICAL/SURGICAL HISTORY  PAST MEDICAL & SURGICAL HISTORY:  Hyperlipemia      Hypertension      Coronary Artery Disease      Diabetes Mellitus Type II      Stented Coronary Artery  total 5 stents, last stent 5/2019      Neuropathy      Myocardial infarction      Stroke  mild left facial numbness   no other residuals verbalized      Myoclonic jerking      Stage 3 chronic kidney disease      History of Cataract Extraction      Hx of CABG      H/O coronary angiogram      S/P coronary artery stent placement  1/6/09      S/P placement of cardiac pacemaker          SOCIAL HISTORY: Substance Use (street drugs): ( x ) never used  (  ) other:    FAMILY HISTORY:  No pertinent family history in first degree relatives         PHYSICAL EXAM:  T(C): 37.5 (01-24-24 @ 20:00), Max: 38.6 (01-24-24 @ 08:00)  HR: 118 (01-24-24 @ 22:00) (84 - 120)  BP: 150/62 (01-24-24 @ 21:00) (135/63 - 155/54)  RR: 20 (01-24-24 @ 21:00) (15 - 27)  SpO2: 97% (01-24-24 @ 22:00) (96% - 100%)  Wt(kg): --  I&O's Summary    23 Jan 2024 07:01  -  24 Jan 2024 07:00  --------------------------------------------------------  IN: 1492.3 mL / OUT: 2440 mL / NET: -947.7 mL    24 Jan 2024 07:01  -  24 Jan 2024 22:50  --------------------------------------------------------  IN: 760 mL / OUT: 845 mL / NET: -85 mL           intubated  EYES:   PERRLA   ENMT:   Moist mucous membranes, Good dentition, No lesions  Cardiovascular: Normal S1 S2, No JVD, No murmurs, No edema  Respiratory: b/l rhonchi   Gastrointestinal:  Soft, Non-tender, + BS	  Extremities: no edema                                    8.2    16.40 )-----------( 305      ( 24 Jan 2024 00:15 )             25.4     01-24    141  |  100  |  31<H>  ----------------------------<  398<H>  3.8   |  29  |  1.66<H>    Ca    8.2<L>      24 Jan 2024 00:15  Phos  1.7     01-24  Mg     2.00     01-24    TPro  6.5  /  Alb  2.5<L>  /  TBili  0.4  /  DBili  x   /  AST  17  /  ALT  16  /  AlkPhos  104  01-24    proBNP:   Lipid Profile:   HgA1c:   TSH:     Consultant(s) Notes Reviewed:  [x ] YES  [ ] NO    Care Discussed with Consultants/Other Providers [ x] YES  [ ] NO    Imaging Personally Reviewed independently:  [x] YES  [ ] NO    All labs, radiologic studies, vitals, orders and medications list reviewed. Patient is seen and examined at bedside. Case discussed with medical team.

## 2024-01-24 NOTE — CONSULT NOTE ADULT - SUBJECTIVE AND OBJECTIVE BOX
HPI:      89 year old Male with PMHx of CAD s/p CABG s/p stents (last stent May 2022), s/p PPM, DM2, CKD (baseline Cr 1.3), PVD, HTN, HLD, CVA x3, and Myoclonic Jerks who presented on 12/23 to the hospital for chest pain, cough , fever, Covid 2 days prior to arrival, started on Paxlovid      Patient states that he has been having a dry cough for the past week, worsened over the past few days.        On arrival to the ED his vitals were T 98 -> 99.2, P 80, /72, RR 18, ) sat 100% RA. His lab work showed leukocytosis with neutrophilia and bandemia, MABLE, mild hyponatremia, indeterminant but stable troponins, and elevated lactate to 2.3. His COVID19 swab was positive. His CXR showed bibasilar crackles.  (23 Dec 2023 22:51)       REVIEW OF SYSTEMS  [  ] ROS unobtainable because:    [  ] All other systems negative except as noted below    Constitutional:  [ ] fever [ ] chills  [ ] weight loss  [ ]night sweat  [ ]poor appetite/PO intake [ ]fatigue   Skin:  [ ] rash [ ] phlebitis	  Eyes: [ ] icterus [ ] pain  [ ] discharge	  ENMT: [ ] sore throat  [ ] thrush [ ] ulcers [ ] exudates [ ]anosmia  Respiratory: [ ] dyspnea [ ] hemoptysis [ ] cough [ ] sputum	  Cardiovascular:  [ ] chest pain [ ] palpitations [ ] edema	  Gastrointestinal:  [ ] nausea [ ] vomiting [ ] diarrhea [ ] constipation [ ] pain	  Genitourinary:  [ ] dysuria [ ] frequency [ ] hematuria [ ] discharge [ ] flank pain  [ ] incontinence  Musculoskeletal:  [ ] myalgias [ ] arthralgias [ ] arthritis  [ ] back pain  Neurological:  [ ] headache [ ] weakness [ ] seizures  [ ] confusion/altered mental status    prior hospital charts reviewed [V]  primary team notes reviewed [V]  other consultant notes reviewed [V]    PAST MEDICAL & SURGICAL HISTORY:  Hyperlipemia    Hypertension    Coronary Artery Disease    Diabetes Mellitus Type II    Stented Coronary Artery  total 5 stents, last stent 5/2019    Neuropathy    Myocardial infarction    Stroke  mild left facial numbness   no other residuals verbalized    Myoclonic jerking    Stage 3 chronic kidney disease    History of Cataract Extraction    Hx of CABG    H/O coronary angiogram    S/P coronary artery stent placement  1/6/09    S/P placement of cardiac pacemaker    SOCIAL HISTORY:  - Denied smoking/vaping/alcohol/recreational drug use    FAMILY HISTORY:  No pertinent family history in first degree relatives    Allergies  fluoroquinolone antibiotics (Other)  Cipro (Unknown)  Tegretol (Unknown)  carbamazepine (Other)    ANTIMICROBIALS:  piperacillin/tazobactam IVPB.. 3.375 every 8 hours    ANTIMICROBIALS (past 90 days):  MEDICATIONS  (STANDING):    azithromycin  IVPB   255 mL/Hr IV Intermittent (01-19-24 @ 17:05)    cefepime   IVPB   100 mL/Hr IV Intermittent (01-20-24 @ 05:33)    cefepime   IVPB   100 mL/Hr IV Intermittent (01-19-24 @ 19:02)    piperacillin/tazobactam IVPB.   200 mL/Hr IV Intermittent (12-24-23 @ 17:01)    piperacillin/tazobactam IVPB.   200 mL/Hr IV Intermittent (01-20-24 @ 13:15)    piperacillin/tazobactam IVPB.-   25 mL/Hr IV Intermittent (01-20-24 @ 16:59)    piperacillin/tazobactam IVPB..   25 mL/Hr IV Intermittent (01-24-24 @ 05:14)   25 mL/Hr IV Intermittent (01-23-24 @ 21:24)   25 mL/Hr IV Intermittent (01-23-24 @ 14:55)   25 mL/Hr IV Intermittent (01-23-24 @ 05:34)   25 mL/Hr IV Intermittent (01-22-24 @ 22:03)   25 mL/Hr IV Intermittent (01-22-24 @ 14:13)   25 mL/Hr IV Intermittent (01-22-24 @ 05:59)   25 mL/Hr IV Intermittent (01-21-24 @ 22:33)   25 mL/Hr IV Intermittent (01-21-24 @ 14:18)   25 mL/Hr IV Intermittent (01-21-24 @ 05:47)   25 mL/Hr IV Intermittent (01-20-24 @ 22:30)    piperacillin/tazobactam IVPB..   25 mL/Hr IV Intermittent (12-31-23 @ 18:21)   25 mL/Hr IV Intermittent (12-31-23 @ 06:16)   25 mL/Hr IV Intermittent (12-30-23 @ 18:05)   25 mL/Hr IV Intermittent (12-30-23 @ 05:47)   25 mL/Hr IV Intermittent (12-29-23 @ 17:51)   25 mL/Hr IV Intermittent (12-29-23 @ 06:08)   25 mL/Hr IV Intermittent (12-28-23 @ 21:48)   25 mL/Hr IV Intermittent (12-28-23 @ 05:37)   25 mL/Hr IV Intermittent (12-27-23 @ 17:02)   25 mL/Hr IV Intermittent (12-27-23 @ 05:46)   25 mL/Hr IV Intermittent (12-26-23 @ 17:43)   25 mL/Hr IV Intermittent (12-26-23 @ 06:33)   25 mL/Hr IV Intermittent (12-25-23 @ 17:22)   25 mL/Hr IV Intermittent (12-25-23 @ 06:59)    remdesivir  IVPB   200 mL/Hr IV Intermittent (12-25-23 @ 03:43)    remdesivir  IVPB   200 mL/Hr IV Intermittent (12-26-23 @ 03:31)    OTHER MEDS:   MEDICATIONS  (STANDING):  acetaminophen     Tablet .. 650 every 6 hours PRN  albuterol/ipratropium for Nebulization 3 every 6 hours  amLODIPine   Tablet 5 daily  aspirin  chewable 81 daily  dextrose 50% Injectable 25 once  dextrose 50% Injectable 25 once  dextrose 50% Injectable 12.5 once  dextrose Oral Gel 15 once PRN  escitalopram 10 daily  glucagon  Injectable 1 once  guaiFENesin Oral Liquid (Sugar-Free) 100 every 6 hours PRN  heparin   Injectable 5000 every 8 hours  insulin lispro (ADMELOG) corrective regimen sliding scale  every 6 hours  insulin NPH human recombinant 16 every 6 hours  levETIRAcetam  IVPB 250 every 12 hours  methylPREDNISolone sodium succinate Injectable 40 daily  pantoprazole  Injectable 40 every 12 hours  polyethylene glycol 3350 17 daily  ranolazine 500 two times a day  senna 2 at bedtime  sodium chloride 3%  Inhalation 4 every 6 hours  sucralfate suspension 1 two times a day    VITALS:  Vital Signs Last 24 Hrs  T(F): 101.4 (01-24-24 @ 08:00), Max: 101.4 (01-24-24 @ 08:00)    Vital Signs Last 24 Hrs  HR: 109 (01-24-24 @ 11:28) (70 - 118)  BP: 140/57 (01-24-24 @ 11:00) (129/46 - 154/59)  RR: 23 (01-24-24 @ 11:00)  SpO2: 99% (01-24-24 @ 11:28) (95% - 100%)  Wt(kg): --    EXAM:  Physical Exam:  Constitutional:  well preserved, comfortable  Head/Eyes: no icterus, PERRL, EOMI  ENT:  supple; no thrush  LUNGS:  CTA  CVS:  normal S1, S2, no murmur  Abd:  soft, non-tender; non-distended  Ext:  no edema  Vascular:  IV site no erythema tenderness or discharge  MSK:  joints without swelling  Neuro: AAO X 3, non- focal    Labs:                        8.2    16.40 )-----------( 305      ( 24 Jan 2024 00:15 )             25.4     01-24    141  |  100  |  31<H>  ----------------------------<  398<H>  3.8   |  29  |  1.66<H>    Ca    8.2<L>      24 Jan 2024 00:15  Phos  1.7     01-24  Mg     2.00     01-24    TPro  6.5  /  Alb  2.5<L>  /  TBili  0.4  /  DBili  x   /  AST  17  /  ALT  16  /  AlkPhos  104  01-24    WBC Trend:  WBC Count: 16.40 (01-24-24 @ 00:15)  WBC Count: 21.23 (01-23-24 @ 00:40)  WBC Count: 21.18 (01-22-24 @ 03:53)  WBC Count: 17.58 (01-21-24 @ 04:05)    Auto Neutrophil #: 13.87 K/uL (12-24-23 @ 06:53)  Auto Neutrophil #: 15.49 K/uL (12-23-23 @ 15:49)  Band Neutrophils %: 6.2 % (12-23-23 @ 15:49)    Creatine Trend:  Creatinine: 1.66 (01-24)  Creatinine: 1.75 (01-23)  Creatinine: 1.79 (01-23)  Creatinine: 1.04 (01-22)    Liver Biochemical Testing Trend:  Alanine Aminotransferase (ALT/SGPT): 16 (01-24)  Alanine Aminotransferase (ALT/SGPT): 17 (01-23)  Alanine Aminotransferase (ALT/SGPT): 21 (01-23)  Alanine Aminotransferase (ALT/SGPT): 13 (01-22)  Alanine Aminotransferase (ALT/SGPT): 22 (01-22)  Aspartate Aminotransferase (AST/SGOT): 17 (01-24-24 @ 00:15)  Aspartate Aminotransferase (AST/SGOT): 17 (01-23-24 @ 16:08)  Aspartate Aminotransferase (AST/SGOT): 17 (01-23-24 @ 00:40)  Aspartate Aminotransferase (AST/SGOT): 26 (01-22-24 @ 17:47)  Aspartate Aminotransferase (AST/SGOT): 25 (01-22-24 @ 03:53)  Bilirubin Total: 0.4 (01-24)  Bilirubin Total: 0.4 (01-23)  Bilirubin Total: 0.5 (01-23)  Bilirubin Total: 0.2 (01-22)  Bilirubin Total: 0.7 (01-22)    Trend LDH    Urinalysis Basic - ( 24 Jan 2024 00:15 )    Color: x / Appearance: x / SG: x / pH: x  Gluc: 398 mg/dL / Ketone: x  / Bili: x / Urobili: x   Blood: x / Protein: x / Nitrite: x   Leuk Esterase: x / RBC: x / WBC x   Sq Epi: x / Non Sq Epi: x / Bacteria: x    MICROBIOLOGY:    MRSA PCR Result.: NotDetec (01-19-24 @ 17:26)  MRSA PCR Result.: NotDetec (01-18-24 @ 18:17)    Culture - Bronchial (collected 22 Jan 2024 15:05)  Source: .Bronchial Bronchial  Preliminary Report:    No growth to date.    Culture - Blood (collected 20 Jan 2024 13:59)  Source: .Blood Blood-Peripheral  Preliminary Report:    No growth at 72 Hours    Culture - Blood (collected 20 Jan 2024 13:49)  Source: .Blood Blood-Peripheral  Preliminary Report:    No growth at 72 Hours    Culture - Acid Fast - Bronchial w/Smear (collected 19 Jan 2024 11:35)  Source: .Bronchial R MIDDLE LOBE    Culture - Fungal, Bronchial (collected 19 Jan 2024 11:35)  Source: .Bronchial R MIDDLE LOBE  Preliminary Report:    Moderate Yeast    Culture - Bronchial (collected 19 Jan 2024 11:35)  Source: .Bronchial R MIDDLE LOBE  Final Report:    Numerous Staphylococcus aureus    Normal Respiratory Aurelia present  Organism: Staphylococcus aureus  Organism: Staphylococcus aureus    Sensitivities:      -  Clindamycin: S <=0.25      -  Oxacillin: S <=0.25 Oxacillin predicts susceptibility for dicloxacillin, methicillin, and nafcillin      -  Gentamicin: S <=1 Should not be used as monotherapy      -  Cefazolin: S <=4      -  Vancomycin: S 2      -  Tetracycline: S <=1      Method Type: ALDA      -  Ampicillin/Sulbactam: S <=8/4      -  Rifampin: S <=1 Should not be used as monotherapy      -  Erythromycin: S <=0.25      -  Trimethoprim/Sulfamethoxazole: S <=0.5/9.5    Culture - Blood (collected 24 Dec 2023 06:45)  Source: .Blood Blood-Peripheral  Final Report:    No growth at 5 days    Culture - Blood (collected 24 Dec 2023 06:37)  Source: .Blood Blood-Peripheral  Final Report:    No growth at 5 days    Legionella Antigen, Urine: Negative (01-19 @ 17:26)    Blood Gas Venous - Lactate: 2.0 (01-24 @ 00:15)  Blood Gas Venous - Lactate: 1.8 (01-21 @ 20:41)    A1C with Estimated Average Glucose Result: 9.3 % (12-24-23 @ 06:53)    RADIOLOGY:  imaging below personally reviewed      < from: CT Angio Abdomen and Pelvis w/ IV Cont (12.24.23 @ 18:38) >  IMPRESSION:  Distended gallbladder with stones and gallbladder wall thickening as well   as mural edema concerning for acute cholecystitis. The surrounding   inflammatory changes extend to the hepatic flexure of the colon. Further   evaluation with right upper quadrant ultrasound is recommended to assess   for acute cholecystitis.    Mild wall thickening of the ascending colon with surrounding inflammatory   changes as above but with preserved vascularity of the colon.   Inflammation is favored to be secondary to the adjacent inflamed   gallbladder versus mild colitis. No colonic pneumatosis to suggest   manifest ischemia.    Moderate atherosclerotic changes of the abdominal aorta, calcific and   soft plaques at the origin of the patent SMA with focal stenosis but no   occlusion.    Mildly distended small bowel loops with air-fluid levels but no   transition point are suggestive of ileus, no obstruction.      < from: US Abdomen Upper Quadrant Right (12.26.23 @ 14:21) >  Distended gallbladder with cholelithiasis and sludge, and top normal wall   thickness. No focal pain was elicited over the gallbladder during the   exam. If there remains concern for acute cholecystitis, consider further   evaluation with HIDA scan.    < end of copied text >  < from: [facility] Hepatobiliary Imaging (12.29.23 @ 09:22) >  IMPRESSION: Abnormal hepatobiliary scan.    Nonvisualization of the gallbladder. In the proper clinical setting,   findings are compatible with acute cholecystitis. To increase the   specificity of this finding, the study may be repeated with Morphine, if   necessary.    Preliminary findings were discussed with Dr. Ann  by Dr. Shay on   12/28/2023 9:40 PM with read back confirmation. Final report was   discussed with DADA Munoz at 9:30 am on 12/29/2023.    --- End of Report ---      < end of copied text >      IMPRESSION:  New small bilateral pleural effusions with associated complete collapse   of the right middle and right lower lobes and partial atelectasis of the   right upper and left lower lobes.    Patchy bilateral groundglass opacities are consistent with pulmonary   edema.    Layering secretions in the trachea, left mainstem bronchus, and right   lower lobe bronchus.    --- End of Report ---          < end of copied text >           HPI:      89 year old Male with PMHx of CAD s/p CABG s/p stents (last stent May 2022), s/p PPM, DM2, CKD (baseline Cr 1.3), PVD, HTN, HLD, CVA x3, and Myoclonic Jerks who presented on 12/23 to the hospital for chest pain, cough , fever, Covid 2 days prior to arrival, started on Paxlovid. Patient states that he has been having a dry cough for the past week, worsened over the past few days.  CT C/A/P (12/24): Distended gallbladder with stones and gallbladder wall thickening as well as mural edema concerning for acute cholecystitis. The surrounding inflammatory changes extend to the hepatic flexure of the colon, secondary to the adjacent inflamed gallbladder versusmild colitis. Ileus, no obstruction. SMA with focal stenosis but no occlusion.On 12/24: Ex lap, mesenteric angiogram, and superior mesenteric artery stent.On 12/26: UD RUQ: Distended gallbladder with cholelithiasis and sludge, and top normal wall thickness. No focal pain was elicited over the gallbladder during the exam. On 12/29: Nonvisualization of the gallbladder. In the proper clinical setting, findings are compatible with acute cholecystitis. To increase the specificity of this finding, the study may be repeated with Morphine, if necessary.  Not a surgical candidate, Antibiotics continued. On 1/1: Acute blood loss anemia with Hb drop and hematemesis, PRBC transfused and GI consulted. On 1/2: S/p EGD with clipping of bleeding gastric body ulcersn 1/5: transferred to floor   On 1/10: LUE venous duplex: No evidence of deep vein thrombosis in the left upper extremity. Superficial vein thrombosis is visualized in the left cephalic vein at the antecubital fossa. Complex, avascular fluid collection is noted at the upper arm measuring ~ 5.3 x 2.7 x 3.7 cm. Differential includes hematoma."On 1/16: AMS, CT head: Partial opacification of the left middle ear and mastoid air cells. Correlate clinically for otomastoiditis. --> ENT no clinical evidence of mastoiditis. EEG -no epileptic activities. CT chest on 1/16 :New small bilateral pleural effusions with associated complete collapse of the right middle and right lower lobes and partial atelectasis of the right upper and left lower lobes.  Patchy bilateral groundglass opacities are consistent with pulmonary edema.Layering secretions in the trachea, left mainstem bronchus, and right lower lobe bronchus.  On 1/19 Bronchoscopy with mucus aspiration and lavage RR post procedure for desaturation, placed on HFNC and transferred to MICU. CXR: moderate R pleural effusion. On 1/22: intubated and bronchoscopy CXR 1/22: Follow-up with endotracheal tube in place, right effusion and bilateral opacities consistent with pulmonary edema.    ID consulted for fevers.     REVIEW OF SYSTEMS  [ X] ROS unobtainable because:  intubated   [  ] All other systems negative except as noted below    Constitutional:  [ ] fever [ ] chills  [ ] weight loss  [ ]night sweat  [ ]poor appetite/PO intake [ ]fatigue   Skin:  [ ] rash [ ] phlebitis	  Eyes: [ ] icterus [ ] pain  [ ] discharge	  ENMT: [ ] sore throat  [ ] thrush [ ] ulcers [ ] exudates [ ]anosmia  Respiratory: [ ] dyspnea [ ] hemoptysis [ ] cough [ ] sputum	  Cardiovascular:  [ ] chest pain [ ] palpitations [ ] edema	  Gastrointestinal:  [ ] nausea [ ] vomiting [ ] diarrhea [ ] constipation [ ] pain	  Genitourinary:  [ ] dysuria [ ] frequency [ ] hematuria [ ] discharge [ ] flank pain  [ ] incontinence  Musculoskeletal:  [ ] myalgias [ ] arthralgias [ ] arthritis  [ ] back pain  Neurological:  [ ] headache [ ] weakness [ ] seizures  [ ] confusion/altered mental status    prior hospital charts reviewed [V]  primary team notes reviewed [V]  other consultant notes reviewed [V]    PAST MEDICAL & SURGICAL HISTORY:  Hyperlipemia    Hypertension    Coronary Artery Disease    Diabetes Mellitus Type II    Stented Coronary Artery  total 5 stents, last stent 5/2019    Neuropathy    Myocardial infarction    Stroke  mild left facial numbness   no other residuals verbalized    Myoclonic jerking    Stage 3 chronic kidney disease    History of Cataract Extraction    Hx of CABG    H/O coronary angiogram    S/P coronary artery stent placement  1/6/09    S/P placement of cardiac pacemaker    SOCIAL HISTORY:  - Denied smoking/vaping/alcohol/recreational drug use    FAMILY HISTORY:  No pertinent family history in first degree relatives    Allergies  fluoroquinolone antibiotics (Other)  Cipro (Unknown)  Tegretol (Unknown)  carbamazepine (Other)    ANTIMICROBIALS:  piperacillin/tazobactam IVPB.. 3.375 every 8 hours    ANTIMICROBIALS (past 90 days):  MEDICATIONS  (STANDING):    azithromycin  IVPB   255 mL/Hr IV Intermittent (01-19-24 @ 17:05)    cefepime   IVPB   100 mL/Hr IV Intermittent (01-20-24 @ 05:33)    cefepime   IVPB   100 mL/Hr IV Intermittent (01-19-24 @ 19:02)    piperacillin/tazobactam IVPB.   200 mL/Hr IV Intermittent (12-24-23 @ 17:01)    piperacillin/tazobactam IVPB.   200 mL/Hr IV Intermittent (01-20-24 @ 13:15)    piperacillin/tazobactam IVPB.-   25 mL/Hr IV Intermittent (01-20-24 @ 16:59)    piperacillin/tazobactam IVPB..   25 mL/Hr IV Intermittent (01-24-24 @ 05:14)   25 mL/Hr IV Intermittent (01-23-24 @ 21:24)   25 mL/Hr IV Intermittent (01-23-24 @ 14:55)   25 mL/Hr IV Intermittent (01-23-24 @ 05:34)   25 mL/Hr IV Intermittent (01-22-24 @ 22:03)   25 mL/Hr IV Intermittent (01-22-24 @ 14:13)   25 mL/Hr IV Intermittent (01-22-24 @ 05:59)   25 mL/Hr IV Intermittent (01-21-24 @ 22:33)   25 mL/Hr IV Intermittent (01-21-24 @ 14:18)   25 mL/Hr IV Intermittent (01-21-24 @ 05:47)   25 mL/Hr IV Intermittent (01-20-24 @ 22:30)    piperacillin/tazobactam IVPB..   25 mL/Hr IV Intermittent (12-31-23 @ 18:21)   25 mL/Hr IV Intermittent (12-31-23 @ 06:16)   25 mL/Hr IV Intermittent (12-30-23 @ 18:05)   25 mL/Hr IV Intermittent (12-30-23 @ 05:47)   25 mL/Hr IV Intermittent (12-29-23 @ 17:51)   25 mL/Hr IV Intermittent (12-29-23 @ 06:08)   25 mL/Hr IV Intermittent (12-28-23 @ 21:48)   25 mL/Hr IV Intermittent (12-28-23 @ 05:37)   25 mL/Hr IV Intermittent (12-27-23 @ 17:02)   25 mL/Hr IV Intermittent (12-27-23 @ 05:46)   25 mL/Hr IV Intermittent (12-26-23 @ 17:43)   25 mL/Hr IV Intermittent (12-26-23 @ 06:33)   25 mL/Hr IV Intermittent (12-25-23 @ 17:22)   25 mL/Hr IV Intermittent (12-25-23 @ 06:59)    remdesivir  IVPB   200 mL/Hr IV Intermittent (12-25-23 @ 03:43)    remdesivir  IVPB   200 mL/Hr IV Intermittent (12-26-23 @ 03:31)    OTHER MEDS:   MEDICATIONS  (STANDING):  acetaminophen     Tablet .. 650 every 6 hours PRN  albuterol/ipratropium for Nebulization 3 every 6 hours  amLODIPine   Tablet 5 daily  aspirin  chewable 81 daily  dextrose 50% Injectable 25 once  dextrose 50% Injectable 25 once  dextrose 50% Injectable 12.5 once  dextrose Oral Gel 15 once PRN  escitalopram 10 daily  glucagon  Injectable 1 once  guaiFENesin Oral Liquid (Sugar-Free) 100 every 6 hours PRN  heparin   Injectable 5000 every 8 hours  insulin lispro (ADMELOG) corrective regimen sliding scale  every 6 hours  insulin NPH human recombinant 16 every 6 hours  levETIRAcetam  IVPB 250 every 12 hours  methylPREDNISolone sodium succinate Injectable 40 daily  pantoprazole  Injectable 40 every 12 hours  polyethylene glycol 3350 17 daily  ranolazine 500 two times a day  senna 2 at bedtime  sodium chloride 3%  Inhalation 4 every 6 hours  sucralfate suspension 1 two times a day    VITALS:  Vital Signs Last 24 Hrs  T(F): 101.4 (01-24-24 @ 08:00), Max: 101.4 (01-24-24 @ 08:00)    Vital Signs Last 24 Hrs  HR: 109 (01-24-24 @ 11:28) (70 - 118)  BP: 140/57 (01-24-24 @ 11:00) (129/46 - 154/59)  RR: 23 (01-24-24 @ 11:00)  SpO2: 99% (01-24-24 @ 11:28) (95% - 100%)  Wt(kg): --    EXAM:  Physical Exam:  Constitutional: sedated   Head/Eyes: no icterus, PERRL, EOMI  ENT:  supple; no thrush  LUNGS:  intubated on mechanical vent   CVS:  normal S1, S2, no murmur  Abd:  soft, non-tender; distended, external urinary catheter   Ext:  LUE swelling   Vascular:  IV site no erythema tenderness or discharge  MSK:  joints without swelling  Neuro: sedated    Labs:                        8.2    16.40 )-----------( 305      ( 24 Jan 2024 00:15 )             25.4     01-24    141  |  100  |  31<H>  ----------------------------<  398<H>  3.8   |  29  |  1.66<H>    Ca    8.2<L>      24 Jan 2024 00:15  Phos  1.7     01-24  Mg     2.00     01-24    TPro  6.5  /  Alb  2.5<L>  /  TBili  0.4  /  DBili  x   /  AST  17  /  ALT  16  /  AlkPhos  104  01-24    WBC Trend:  WBC Count: 16.40 (01-24-24 @ 00:15)  WBC Count: 21.23 (01-23-24 @ 00:40)  WBC Count: 21.18 (01-22-24 @ 03:53)  WBC Count: 17.58 (01-21-24 @ 04:05)    Auto Neutrophil #: 13.87 K/uL (12-24-23 @ 06:53)  Auto Neutrophil #: 15.49 K/uL (12-23-23 @ 15:49)  Band Neutrophils %: 6.2 % (12-23-23 @ 15:49)    Creatine Trend:  Creatinine: 1.66 (01-24)  Creatinine: 1.75 (01-23)  Creatinine: 1.79 (01-23)  Creatinine: 1.04 (01-22)    Liver Biochemical Testing Trend:  Alanine Aminotransferase (ALT/SGPT): 16 (01-24)  Alanine Aminotransferase (ALT/SGPT): 17 (01-23)  Alanine Aminotransferase (ALT/SGPT): 21 (01-23)  Alanine Aminotransferase (ALT/SGPT): 13 (01-22)  Alanine Aminotransferase (ALT/SGPT): 22 (01-22)  Aspartate Aminotransferase (AST/SGOT): 17 (01-24-24 @ 00:15)  Aspartate Aminotransferase (AST/SGOT): 17 (01-23-24 @ 16:08)  Aspartate Aminotransferase (AST/SGOT): 17 (01-23-24 @ 00:40)  Aspartate Aminotransferase (AST/SGOT): 26 (01-22-24 @ 17:47)  Aspartate Aminotransferase (AST/SGOT): 25 (01-22-24 @ 03:53)  Bilirubin Total: 0.4 (01-24)  Bilirubin Total: 0.4 (01-23)  Bilirubin Total: 0.5 (01-23)  Bilirubin Total: 0.2 (01-22)  Bilirubin Total: 0.7 (01-22)    Trend LDH    Urinalysis Basic - ( 24 Jan 2024 00:15 )    Color: x / Appearance: x / SG: x / pH: x  Gluc: 398 mg/dL / Ketone: x  / Bili: x / Urobili: x   Blood: x / Protein: x / Nitrite: x   Leuk Esterase: x / RBC: x / WBC x   Sq Epi: x / Non Sq Epi: x / Bacteria: x    MICROBIOLOGY:    MRSA PCR Result.: NotDetec (01-19-24 @ 17:26)  MRSA PCR Result.: NotDetec (01-18-24 @ 18:17)    Culture - Bronchial (collected 22 Jan 2024 15:05)  Source: .Bronchial Bronchial  Preliminary Report:    No growth to date.    Culture - Blood (collected 20 Jan 2024 13:59)  Source: .Blood Blood-Peripheral  Preliminary Report:    No growth at 72 Hours    Culture - Blood (collected 20 Jan 2024 13:49)  Source: .Blood Blood-Peripheral  Preliminary Report:    No growth at 72 Hours    Culture - Acid Fast - Bronchial w/Smear (collected 19 Jan 2024 11:35)  Source: .Bronchial R MIDDLE LOBE    Culture - Fungal, Bronchial (collected 19 Jan 2024 11:35)  Source: .Bronchial R MIDDLE LOBE  Preliminary Report:    Moderate Yeast    Culture - Bronchial (collected 19 Jan 2024 11:35)  Source: .Bronchial R MIDDLE LOBE  Final Report:    Numerous Staphylococcus aureus    Normal Respiratory Aurelia present  Organism: Staphylococcus aureus  Organism: Staphylococcus aureus    Sensitivities:      -  Clindamycin: S <=0.25      -  Oxacillin: S <=0.25 Oxacillin predicts susceptibility for dicloxacillin, methicillin, and nafcillin      -  Gentamicin: S <=1 Should not be used as monotherapy      -  Cefazolin: S <=4      -  Vancomycin: S 2      -  Tetracycline: S <=1      Method Type: ALDA      -  Ampicillin/Sulbactam: S <=8/4      -  Rifampin: S <=1 Should not be used as monotherapy      -  Erythromycin: S <=0.25      -  Trimethoprim/Sulfamethoxazole: S <=0.5/9.5    Culture - Blood (collected 24 Dec 2023 06:45)  Source: .Blood Blood-Peripheral  Final Report:    No growth at 5 days    Culture - Blood (collected 24 Dec 2023 06:37)  Source: .Blood Blood-Peripheral  Final Report:    No growth at 5 days    Legionella Antigen, Urine: Negative (01-19 @ 17:26)    Blood Gas Venous - Lactate: 2.0 (01-24 @ 00:15)  Blood Gas Venous - Lactate: 1.8 (01-21 @ 20:41)    A1C with Estimated Average Glucose Result: 9.3 % (12-24-23 @ 06:53)    RADIOLOGY:  imaging below personally reviewed      < from: CT Angio Abdomen and Pelvis w/ IV Cont (12.24.23 @ 18:38) >  IMPRESSION:  Distended gallbladder with stones and gallbladder wall thickening as well   as mural edema concerning for acute cholecystitis. The surrounding   inflammatory changes extend to the hepatic flexure of the colon. Further   evaluation with right upper quadrant ultrasound is recommended to assess   for acute cholecystitis.    Mild wall thickening of the ascending colon with surrounding inflammatory   changes as above but with preserved vascularity of the colon.   Inflammation is favored to be secondary to the adjacent inflamed   gallbladder versus mild colitis. No colonic pneumatosis to suggest   manifest ischemia.    Moderate atherosclerotic changes of the abdominal aorta, calcific and   soft plaques at the origin of the patent SMA with focal stenosis but no   occlusion.    Mildly distended small bowel loops with air-fluid levels but no   transition point are suggestive of ileus, no obstruction.      < from: US Abdomen Upper Quadrant Right (12.26.23 @ 14:21) >  Distended gallbladder with cholelithiasis and sludge, and top normal wall   thickness. No focal pain was elicited over the gallbladder during the   exam. If there remains concern for acute cholecystitis, consider further   evaluation with HIDA scan.    < end of copied text >  < from: [facility] Hepatobiliary Imaging (12.29.23 @ 09:22) >  IMPRESSION: Abnormal hepatobiliary scan.    Nonvisualization of the gallbladder. In the proper clinical setting,   findings are compatible with acute cholecystitis. To increase the   specificity of this finding, the study may be repeated with Morphine, if   necessary.    Preliminary findings were discussed with Dr. Ann  by Dr. Shay on   12/28/2023 9:40 PM with read back confirmation. Final report was   discussed with DADA Munoz at 9:30 am on 12/29/2023.    --- End of Report ---      < end of copied text >      IMPRESSION:  New small bilateral pleural effusions with associated complete collapse   of the right middle and right lower lobes and partial atelectasis of the   right upper and left lower lobes.    Patchy bilateral groundglass opacities are consistent with pulmonary   edema.    Layering secretions in the trachea, left mainstem bronchus, and right   lower lobe bronchus.    --- End of Report ---          < end of copied text >           HPI:      89 year old Male with PMHx of CAD s/p CABG s/p stents (last stent May 2022), s/p PPM, DM2, CKD (baseline Cr 1.3), PVD, HTN, HLD, CVA x3, and Myoclonic Jerks who presented on 12/23 to the hospital for chest pain, cough , fever, Covid 2 days prior to arrival, started on Paxlovid. Patient states that he has been having a dry cough for the past week, worsened over the past few days.  CT C/A/P (12/24): Distended gallbladder with stones and gallbladder wall thickening as well as mural edema concerning for acute cholecystitis. The surrounding inflammatory changes extend to the hepatic flexure of the colon, secondary to the adjacent inflamed gallbladder versusmild colitis. Ileus, no obstruction. SMA with focal stenosis but no occlusion.On 12/24: Ex lap, mesenteric angiogram, and superior mesenteric artery stent.On 12/26: UD RUQ: Distended gallbladder with cholelithiasis and sludge, and top normal wall thickness. No focal pain was elicited over the gallbladder during the exam. On 12/29: Nonvisualization of the gallbladder. In the proper clinical setting, findings are compatible with acute cholecystitis. To increase the specificity of this finding, the study may be repeated with Morphine, if necessary.  Not a surgical candidate, Antibiotics continued. On 1/1: Acute blood loss anemia with Hb drop and hematemesis, PRBC transfused and GI consulted. On 1/2: S/p EGD with clipping of bleeding gastric body ulcersn 1/5: transferred to floor   On 1/10: LUE venous duplex: No evidence of deep vein thrombosis in the left upper extremity. Superficial vein thrombosis is visualized in the left cephalic vein at the antecubital fossa. Complex, avascular fluid collection is noted at the upper arm measuring ~ 5.3 x 2.7 x 3.7 cm. Differential includes hematoma."On 1/16: AMS, CT head: Partial opacification of the left middle ear and mastoid air cells. Correlate clinically for otomastoiditis. --> ENT no clinical evidence of mastoiditis. EEG -no epileptic activities. CT chest on 1/16 :New small bilateral pleural effusions with associated complete collapse of the right middle and right lower lobes and partial atelectasis of the right upper and left lower lobes.  Patchy bilateral groundglass opacities are consistent with pulmonary edema.Layering secretions in the trachea, left mainstem bronchus, and right lower lobe bronchus.  On 1/19 Bronchoscopy with mucus aspiration and lavage RR post procedure for desaturation, placed on HFNC and transferred to MICU. CXR: moderate R pleural effusion. On 1/22: intubated and bronchoscopy CXR 1/22: Follow-up with endotracheal tube in place, right effusion and bilateral opacities consistent with pulmonary edema.    ID consulted for fevers.     REVIEW OF SYSTEMS  [ X] ROS unobtainable because:  intubated   [  ] All other systems negative except as noted below    Constitutional:  [ ] fever [ ] chills  [ ] weight loss  [ ]night sweat  [ ]poor appetite/PO intake [ ]fatigue   Skin:  [ ] rash [ ] phlebitis	  Eyes: [ ] icterus [ ] pain  [ ] discharge	  ENMT: [ ] sore throat  [ ] thrush [ ] ulcers [ ] exudates [ ]anosmia  Respiratory: [ ] dyspnea [ ] hemoptysis [ ] cough [ ] sputum	  Cardiovascular:  [ ] chest pain [ ] palpitations [ ] edema	  Gastrointestinal:  [ ] nausea [ ] vomiting [ ] diarrhea [ ] constipation [ ] pain	  Genitourinary:  [ ] dysuria [ ] frequency [ ] hematuria [ ] discharge [ ] flank pain  [ ] incontinence  Musculoskeletal:  [ ] myalgias [ ] arthralgias [ ] arthritis  [ ] back pain  Neurological:  [ ] headache [ ] weakness [ ] seizures  [ ] confusion/altered mental status    prior hospital charts reviewed [V]  primary team notes reviewed [V]  other consultant notes reviewed [V]    PAST MEDICAL & SURGICAL HISTORY:  Hyperlipemia    Hypertension    Coronary Artery Disease    Diabetes Mellitus Type II    Stented Coronary Artery  total 5 stents, last stent 5/2019    Neuropathy    Myocardial infarction    Stroke  mild left facial numbness   no other residuals verbalized    Myoclonic jerking    Stage 3 chronic kidney disease    History of Cataract Extraction    Hx of CABG    H/O coronary angiogram    S/P coronary artery stent placement  1/6/09    S/P placement of cardiac pacemaker    SOCIAL HISTORY:  No tobacco or illicit drug history    FAMILY HISTORY:  No pertinent family history in first degree relatives of ARDS    Allergies  fluoroquinolone antibiotics (Other)  Cipro (Unknown)  Tegretol (Unknown)  carbamazepine (Other)    ANTIMICROBIALS:  piperacillin/tazobactam IVPB.. 3.375 every 8 hours    ANTIMICROBIALS (past 90 days):  MEDICATIONS  (STANDING):    azithromycin  IVPB   255 mL/Hr IV Intermittent (01-19-24 @ 17:05)    cefepime   IVPB   100 mL/Hr IV Intermittent (01-20-24 @ 05:33)    cefepime   IVPB   100 mL/Hr IV Intermittent (01-19-24 @ 19:02)    piperacillin/tazobactam IVPB.   200 mL/Hr IV Intermittent (12-24-23 @ 17:01)    piperacillin/tazobactam IVPB.   200 mL/Hr IV Intermittent (01-20-24 @ 13:15)    piperacillin/tazobactam IVPB.-   25 mL/Hr IV Intermittent (01-20-24 @ 16:59)    piperacillin/tazobactam IVPB..   25 mL/Hr IV Intermittent (01-24-24 @ 05:14)   25 mL/Hr IV Intermittent (01-23-24 @ 21:24)   25 mL/Hr IV Intermittent (01-23-24 @ 14:55)   25 mL/Hr IV Intermittent (01-23-24 @ 05:34)   25 mL/Hr IV Intermittent (01-22-24 @ 22:03)   25 mL/Hr IV Intermittent (01-22-24 @ 14:13)   25 mL/Hr IV Intermittent (01-22-24 @ 05:59)   25 mL/Hr IV Intermittent (01-21-24 @ 22:33)   25 mL/Hr IV Intermittent (01-21-24 @ 14:18)   25 mL/Hr IV Intermittent (01-21-24 @ 05:47)   25 mL/Hr IV Intermittent (01-20-24 @ 22:30)    piperacillin/tazobactam IVPB..   25 mL/Hr IV Intermittent (12-31-23 @ 18:21)   25 mL/Hr IV Intermittent (12-31-23 @ 06:16)   25 mL/Hr IV Intermittent (12-30-23 @ 18:05)   25 mL/Hr IV Intermittent (12-30-23 @ 05:47)   25 mL/Hr IV Intermittent (12-29-23 @ 17:51)   25 mL/Hr IV Intermittent (12-29-23 @ 06:08)   25 mL/Hr IV Intermittent (12-28-23 @ 21:48)   25 mL/Hr IV Intermittent (12-28-23 @ 05:37)   25 mL/Hr IV Intermittent (12-27-23 @ 17:02)   25 mL/Hr IV Intermittent (12-27-23 @ 05:46)   25 mL/Hr IV Intermittent (12-26-23 @ 17:43)   25 mL/Hr IV Intermittent (12-26-23 @ 06:33)   25 mL/Hr IV Intermittent (12-25-23 @ 17:22)   25 mL/Hr IV Intermittent (12-25-23 @ 06:59)    remdesivir  IVPB   200 mL/Hr IV Intermittent (12-25-23 @ 03:43)    remdesivir  IVPB   200 mL/Hr IV Intermittent (12-26-23 @ 03:31)    OTHER MEDS:   MEDICATIONS  (STANDING):  acetaminophen     Tablet .. 650 every 6 hours PRN  albuterol/ipratropium for Nebulization 3 every 6 hours  amLODIPine   Tablet 5 daily  aspirin  chewable 81 daily  dextrose 50% Injectable 25 once  dextrose 50% Injectable 25 once  dextrose 50% Injectable 12.5 once  dextrose Oral Gel 15 once PRN  escitalopram 10 daily  glucagon  Injectable 1 once  guaiFENesin Oral Liquid (Sugar-Free) 100 every 6 hours PRN  heparin   Injectable 5000 every 8 hours  insulin lispro (ADMELOG) corrective regimen sliding scale  every 6 hours  insulin NPH human recombinant 16 every 6 hours  levETIRAcetam  IVPB 250 every 12 hours  methylPREDNISolone sodium succinate Injectable 40 daily  pantoprazole  Injectable 40 every 12 hours  polyethylene glycol 3350 17 daily  ranolazine 500 two times a day  senna 2 at bedtime  sodium chloride 3%  Inhalation 4 every 6 hours  sucralfate suspension 1 two times a day    VITALS:  Vital Signs Last 24 Hrs  T(F): 101.4 (01-24-24 @ 08:00), Max: 101.4 (01-24-24 @ 08:00)    Vital Signs Last 24 Hrs  HR: 109 (01-24-24 @ 11:28) (70 - 118)  BP: 140/57 (01-24-24 @ 11:00) (129/46 - 154/59)  RR: 23 (01-24-24 @ 11:00)  SpO2: 99% (01-24-24 @ 11:28) (95% - 100%)  Wt(kg): --    EXAM:  Physical Exam:  Constitutional: sedated   Head/Eyes: no icterus, PERRL, EOMI  ENT:  supple; no thrush  LUNGS:  intubated on mechanical vent   CVS:  normal S1, S2, no murmur  Abd:  soft, non-tender; distended, external urinary catheter   Ext:  LUE swelling   Vascular:  IV site no erythema tenderness or discharge  MSK:  joints without swelling  Neuro: sedated    Labs:                        8.2    16.40 )-----------( 305      ( 24 Jan 2024 00:15 )             25.4     01-24    141  |  100  |  31<H>  ----------------------------<  398<H>  3.8   |  29  |  1.66<H>    Ca    8.2<L>      24 Jan 2024 00:15  Phos  1.7     01-24  Mg     2.00     01-24    TPro  6.5  /  Alb  2.5<L>  /  TBili  0.4  /  DBili  x   /  AST  17  /  ALT  16  /  AlkPhos  104  01-24    WBC Trend:  WBC Count: 16.40 (01-24-24 @ 00:15)  WBC Count: 21.23 (01-23-24 @ 00:40)  WBC Count: 21.18 (01-22-24 @ 03:53)  WBC Count: 17.58 (01-21-24 @ 04:05)    Auto Neutrophil #: 13.87 K/uL (12-24-23 @ 06:53)  Auto Neutrophil #: 15.49 K/uL (12-23-23 @ 15:49)  Band Neutrophils %: 6.2 % (12-23-23 @ 15:49)    Creatine Trend:  Creatinine: 1.66 (01-24)  Creatinine: 1.75 (01-23)  Creatinine: 1.79 (01-23)  Creatinine: 1.04 (01-22)    Liver Biochemical Testing Trend:  Alanine Aminotransferase (ALT/SGPT): 16 (01-24)  Alanine Aminotransferase (ALT/SGPT): 17 (01-23)  Alanine Aminotransferase (ALT/SGPT): 21 (01-23)  Alanine Aminotransferase (ALT/SGPT): 13 (01-22)  Alanine Aminotransferase (ALT/SGPT): 22 (01-22)  Aspartate Aminotransferase (AST/SGOT): 17 (01-24-24 @ 00:15)  Aspartate Aminotransferase (AST/SGOT): 17 (01-23-24 @ 16:08)  Aspartate Aminotransferase (AST/SGOT): 17 (01-23-24 @ 00:40)  Aspartate Aminotransferase (AST/SGOT): 26 (01-22-24 @ 17:47)  Aspartate Aminotransferase (AST/SGOT): 25 (01-22-24 @ 03:53)  Bilirubin Total: 0.4 (01-24)  Bilirubin Total: 0.4 (01-23)  Bilirubin Total: 0.5 (01-23)  Bilirubin Total: 0.2 (01-22)  Bilirubin Total: 0.7 (01-22)    Trend LDH    Urinalysis Basic - ( 24 Jan 2024 00:15 )    Color: x / Appearance: x / SG: x / pH: x  Gluc: 398 mg/dL / Ketone: x  / Bili: x / Urobili: x   Blood: x / Protein: x / Nitrite: x   Leuk Esterase: x / RBC: x / WBC x   Sq Epi: x / Non Sq Epi: x / Bacteria: x    MICROBIOLOGY:    MRSA PCR Result.: NotDetec (01-19-24 @ 17:26)  MRSA PCR Result.: NotDetec (01-18-24 @ 18:17)    Culture - Bronchial (collected 22 Jan 2024 15:05)  Source: .Bronchial Bronchial  Preliminary Report:    No growth to date.    Culture - Blood (collected 20 Jan 2024 13:59)  Source: .Blood Blood-Peripheral  Preliminary Report:    No growth at 72 Hours    Culture - Blood (collected 20 Jan 2024 13:49)  Source: .Blood Blood-Peripheral  Preliminary Report:    No growth at 72 Hours    Culture - Acid Fast - Bronchial w/Smear (collected 19 Jan 2024 11:35)  Source: .Bronchial R MIDDLE LOBE    Culture - Fungal, Bronchial (collected 19 Jan 2024 11:35)  Source: .Bronchial R MIDDLE LOBE  Preliminary Report:    Moderate Yeast    Culture - Bronchial (collected 19 Jan 2024 11:35)  Source: .Bronchial R MIDDLE LOBE  Final Report:    Numerous Staphylococcus aureus    Normal Respiratory Aurelia present  Organism: Staphylococcus aureus  Organism: Staphylococcus aureus    Sensitivities:      -  Clindamycin: S <=0.25      -  Oxacillin: S <=0.25 Oxacillin predicts susceptibility for dicloxacillin, methicillin, and nafcillin      -  Gentamicin: S <=1 Should not be used as monotherapy      -  Cefazolin: S <=4      -  Vancomycin: S 2      -  Tetracycline: S <=1      Method Type: ALDA      -  Ampicillin/Sulbactam: S <=8/4      -  Rifampin: S <=1 Should not be used as monotherapy      -  Erythromycin: S <=0.25      -  Trimethoprim/Sulfamethoxazole: S <=0.5/9.5    Culture - Blood (collected 24 Dec 2023 06:45)  Source: .Blood Blood-Peripheral  Final Report:    No growth at 5 days    Culture - Blood (collected 24 Dec 2023 06:37)  Source: .Blood Blood-Peripheral  Final Report:    No growth at 5 days    Legionella Antigen, Urine: Negative (01-19 @ 17:26)    Blood Gas Venous - Lactate: 2.0 (01-24 @ 00:15)  Blood Gas Venous - Lactate: 1.8 (01-21 @ 20:41)    A1C with Estimated Average Glucose Result: 9.3 % (12-24-23 @ 06:53)    RADIOLOGY:  imaging below personally reviewed      < from: CT Angio Abdomen and Pelvis w/ IV Cont (12.24.23 @ 18:38) >  IMPRESSION:  Distended gallbladder with stones and gallbladder wall thickening as well   as mural edema concerning for acute cholecystitis. The surrounding   inflammatory changes extend to the hepatic flexure of the colon. Further   evaluation with right upper quadrant ultrasound is recommended to assess   for acute cholecystitis.    Mild wall thickening of the ascending colon with surrounding inflammatory   changes as above but with preserved vascularity of the colon.   Inflammation is favored to be secondary to the adjacent inflamed   gallbladder versus mild colitis. No colonic pneumatosis to suggest   manifest ischemia.    Moderate atherosclerotic changes of the abdominal aorta, calcific and   soft plaques at the origin of the patent SMA with focal stenosis but no   occlusion.    Mildly distended small bowel loops with air-fluid levels but no   transition point are suggestive of ileus, no obstruction.      < from: US Abdomen Upper Quadrant Right (12.26.23 @ 14:21) >  Distended gallbladder with cholelithiasis and sludge, and top normal wall   thickness. No focal pain was elicited over the gallbladder during the   exam. If there remains concern for acute cholecystitis, consider further   evaluation with HIDA scan.    < end of copied text >  < from: [facility] Hepatobiliary Imaging (12.29.23 @ 09:22) >  IMPRESSION: Abnormal hepatobiliary scan.    Nonvisualization of the gallbladder. In the proper clinical setting,   findings are compatible with acute cholecystitis. To increase the   specificity of this finding, the study may be repeated with Morphine, if   necessary.    Preliminary findings were discussed with Dr. Ann  by Dr. Shay on   12/28/2023 9:40 PM with read back confirmation. Final report was   discussed with DADA Munoz at 9:30 am on 12/29/2023.    --- End of Report ---      < end of copied text >      IMPRESSION:  New small bilateral pleural effusions with associated complete collapse   of the right middle and right lower lobes and partial atelectasis of the   right upper and left lower lobes.    Patchy bilateral groundglass opacities are consistent with pulmonary   edema.    Layering secretions in the trachea, left mainstem bronchus, and right   lower lobe bronchus.    --- End of Report ---          < end of copied text >

## 2024-01-24 NOTE — PROGRESS NOTE ADULT - SUBJECTIVE AND OBJECTIVE BOX
PROGRESS NOTE:   Authored by Dr. Beata Wahl MD (PGY-1). Pager [facility] [phone] / KATHIA ( [id number]) or via TEAMS    Patient is a 89y old  Male who presents with a chief complaint of COVID19, Chest pain (23 Jan 2024 11:18)      SUBJECTIVE / OVERNIGHT EVENTS:  No acute events overnight.     ADDITIONAL REVIEW OF SYSTEMS:  Patient denies fevers, chills, chest pain, shortness of breath, nausea, abdominal pain, diarrhea, constipation, dysuria, leg swelling, headache, light headedness.    MEDICATIONS  (STANDING):  albuterol/ipratropium for Nebulization 3 milliLiter(s) Nebulizer every 6 hours  amLODIPine   Tablet 5 milliGRAM(s) Oral daily  aspirin  chewable 81 milliGRAM(s) Oral daily  chlorhexidine 0.12% Liquid 15 milliLiter(s) Oral Mucosa every 12 hours  chlorhexidine 2% Cloths 1 Application(s) Topical daily  dexMEDEtomidine Infusion 0.1 MICROgram(s)/kG/Hr (1.98 mL/Hr) IV Continuous <Continuous>  dextrose 5%. 1000 milliLiter(s) (50 mL/Hr) IV Continuous <Continuous>  dextrose 5%. 1000 milliLiter(s) (100 mL/Hr) IV Continuous <Continuous>  dextrose 50% Injectable 25 Gram(s) IV Push once  dextrose 50% Injectable 25 Gram(s) IV Push once  dextrose 50% Injectable 12.5 Gram(s) IV Push once  escitalopram 10 milliGRAM(s) Oral daily  glucagon  Injectable 1 milliGRAM(s) IntraMuscular once  heparin   Injectable 5000 Unit(s) SubCutaneous every 8 hours  insulin lispro (ADMELOG) corrective regimen sliding scale   SubCutaneous every 6 hours  insulin NPH human recombinant 16 Unit(s) SubCutaneous every 6 hours  levETIRAcetam  IVPB 250 milliGRAM(s) IV Intermittent every 12 hours  lidocaine   4% Patch 1 Patch Transdermal every 24 hours  methylPREDNISolone sodium succinate Injectable 40 milliGRAM(s) IV Push daily  pantoprazole  Injectable 40 milliGRAM(s) IV Push every 12 hours  piperacillin/tazobactam IVPB.. 3.375 Gram(s) IV Intermittent every 8 hours  polyethylene glycol 3350 17 Gram(s) Oral daily  potassium phosphate / sodium phosphate Powder (PHOS-NaK) 1 Packet(s) Oral three times a day  ranolazine 500 milliGRAM(s) Oral two times a day  senna 2 Tablet(s) Oral at bedtime  sodium chloride 3%  Inhalation 4 milliLiter(s) Inhalation every 6 hours  sucralfate suspension 1 Gram(s) Oral two times a day  ticagrelor 60 milliGRAM(s) Oral every 12 hours    MEDICATIONS  (PRN):  acetaminophen     Tablet .. 650 milliGRAM(s) Oral every 6 hours PRN Temp greater or equal to 38C (100.4F), Mild Pain (1 - 3), Moderate Pain (4 - 6)  dextrose Oral Gel 15 Gram(s) Oral once PRN Blood Glucose LESS THAN 70 milliGRAM(s)/deciliter  guaiFENesin Oral Liquid (Sugar-Free) 100 milliGRAM(s) Oral every 6 hours PRN Cough      CAPILLARY BLOOD GLUCOSE      POCT Blood Glucose.: 298 mg/dL (24 Jan 2024 05:11)  POCT Blood Glucose.: 397 mg/dL (23 Jan 2024 23:22)  POCT Blood Glucose.: 460 mg/dL (23 Jan 2024 18:07)  POCT Blood Glucose.: 396 mg/dL (23 Jan 2024 11:48)    I&O's Summary    23 Jan 2024 07:01  -  24 Jan 2024 07:00  --------------------------------------------------------  IN: 1452.3 mL / OUT: 2380 mL / NET: -927.7 mL        PHYSICAL EXAM:  Vital Signs Last 24 Hrs  T(C): 38 (24 Jan 2024 04:00), Max: 38.1 (23 Jan 2024 20:00)  T(F): 100.4 (24 Jan 2024 04:00), Max: 100.5 (23 Jan 2024 20:00)  HR: 115 (24 Jan 2024 06:36) (65 - 117)  BP: 145/65 (24 Jan 2024 05:00) (121/36 - 154/59)  BP(mean): 83 (24 Jan 2024 05:00) (57 - 83)  RR: 27 (24 Jan 2024 05:00) (19 - 27)  SpO2: 100% (24 Jan 2024 06:36) (95% - 100%)    Parameters below as of 24 Jan 2024 05:00  Patient On (Oxygen Delivery Method): ventilator    O2 Concentration (%): 50    CONSTITUTIONAL: NAD, well-developed  RESPIRATORY: Normal respiratory effort; lungs are clear to auscultation bilaterally  CARDIOVASCULAR: Regular rate and rhythm, normal S1 and S2, no murmur/rub/gallop; No lower extremity edema; Peripheral pulses are 2+ bilaterally  ABDOMEN: Nontender to palpation, normoactive bowel sounds, no rebound/guarding; No hepatosplenomegaly  MSK: no clubbing or cyanosis of digits; no joint swelling or tenderness to palpation  PSYCH: A+O to person, place, and time; affect appropriate    LABS:                        8.2    16.40 )-----------( 305      ( 24 Jan 2024 00:15 )             25.4     01-24    141  |  100  |  31<H>  ----------------------------<  398<H>  3.8   |  29  |  1.66<H>    Ca    8.2<L>      24 Jan 2024 00:15  Phos  1.7     01-24  Mg     2.00     01-24    TPro  6.5  /  Alb  2.5<L>  /  TBili  0.4  /  DBili  x   /  AST  17  /  ALT  16  /  AlkPhos  104  01-24    PT/INR - ( 24 Jan 2024 00:15 )   PT: 12.7 sec;   INR: 1.13 ratio         PTT - ( 24 Jan 2024 00:15 )  PTT:34.3 sec      Urinalysis Basic - ( 24 Jan 2024 00:15 )    Color: x / Appearance: x / SG: x / pH: x  Gluc: 398 mg/dL / Ketone: x  / Bili: x / Urobili: x   Blood: x / Protein: x / Nitrite: x   Leuk Esterase: x / RBC: x / WBC x   Sq Epi: x / Non Sq Epi: x / Bacteria: x        Culture - Bronchial (collected 22 Jan 2024 15:05)  Source: .Bronchial Bronchial  Gram Stain (23 Jan 2024 07:34):    Moderate polymorphonuclear leukocytes seen per low power field    No squamous epithelial cells per low power field    No organisms seen per oil power field  Preliminary Report (23 Jan 2024 18:22):    No growth to date.        Tele Reviewed:    RADIOLOGY & ADDITIONAL TESTS:  Results Reviewed:   Imaging Personally Reviewed:  Electrocardiogram Personally Reviewed:     PROGRESS NOTE:   Authored by Dr. Beata Wahl MD (PGY-1). Pager [facility] [phone] / KATHIA ( [id number]) or via TEAMS    Patient is a 89y old  Male who presents with a chief complaint of COVID19, Chest pain (23 Jan 2024 11:18)      SUBJECTIVE / OVERNIGHT EVENTS:  No acute events overnight. Pt with tmax to 100.4, off all sedatives currently. Arousable to voice, opens eyes, minimally responsive to hand .     MEDICATIONS  (STANDING):  albuterol/ipratropium for Nebulization 3 milliLiter(s) Nebulizer every 6 hours  amLODIPine   Tablet 5 milliGRAM(s) Oral daily  aspirin  chewable 81 milliGRAM(s) Oral daily  chlorhexidine 0.12% Liquid 15 milliLiter(s) Oral Mucosa every 12 hours  chlorhexidine 2% Cloths 1 Application(s) Topical daily  dexMEDEtomidine Infusion 0.1 MICROgram(s)/kG/Hr (1.98 mL/Hr) IV Continuous <Continuous>  dextrose 5%. 1000 milliLiter(s) (50 mL/Hr) IV Continuous <Continuous>  dextrose 5%. 1000 milliLiter(s) (100 mL/Hr) IV Continuous <Continuous>  dextrose 50% Injectable 25 Gram(s) IV Push once  dextrose 50% Injectable 25 Gram(s) IV Push once  dextrose 50% Injectable 12.5 Gram(s) IV Push once  escitalopram 10 milliGRAM(s) Oral daily  glucagon  Injectable 1 milliGRAM(s) IntraMuscular once  heparin   Injectable 5000 Unit(s) SubCutaneous every 8 hours  insulin lispro (ADMELOG) corrective regimen sliding scale   SubCutaneous every 6 hours  insulin NPH human recombinant 16 Unit(s) SubCutaneous every 6 hours  levETIRAcetam  IVPB 250 milliGRAM(s) IV Intermittent every 12 hours  lidocaine   4% Patch 1 Patch Transdermal every 24 hours  methylPREDNISolone sodium succinate Injectable 40 milliGRAM(s) IV Push daily  pantoprazole  Injectable 40 milliGRAM(s) IV Push every 12 hours  piperacillin/tazobactam IVPB.. 3.375 Gram(s) IV Intermittent every 8 hours  polyethylene glycol 3350 17 Gram(s) Oral daily  potassium phosphate / sodium phosphate Powder (PHOS-NaK) 1 Packet(s) Oral three times a day  ranolazine 500 milliGRAM(s) Oral two times a day  senna 2 Tablet(s) Oral at bedtime  sodium chloride 3%  Inhalation 4 milliLiter(s) Inhalation every 6 hours  sucralfate suspension 1 Gram(s) Oral two times a day  ticagrelor 60 milliGRAM(s) Oral every 12 hours    MEDICATIONS  (PRN):  acetaminophen     Tablet .. 650 milliGRAM(s) Oral every 6 hours PRN Temp greater or equal to 38C (100.4F), Mild Pain (1 - 3), Moderate Pain (4 - 6)  dextrose Oral Gel 15 Gram(s) Oral once PRN Blood Glucose LESS THAN 70 milliGRAM(s)/deciliter  guaiFENesin Oral Liquid (Sugar-Free) 100 milliGRAM(s) Oral every 6 hours PRN Cough      CAPILLARY BLOOD GLUCOSE      POCT Blood Glucose.: 298 mg/dL (24 Jan 2024 05:11)  POCT Blood Glucose.: 397 mg/dL (23 Jan 2024 23:22)  POCT Blood Glucose.: 460 mg/dL (23 Jan 2024 18:07)  POCT Blood Glucose.: 396 mg/dL (23 Jan 2024 11:48)    I&O's Summary    23 Jan 2024 07:01  -  24 Jan 2024 07:00  --------------------------------------------------------  IN: 1452.3 mL / OUT: 2380 mL / NET: -927.7 mL        PHYSICAL EXAM:  Vital Signs Last 24 Hrs  T(C): 38 (24 Jan 2024 04:00), Max: 38.1 (23 Jan 2024 20:00)  T(F): 100.4 (24 Jan 2024 04:00), Max: 100.5 (23 Jan 2024 20:00)  HR: 115 (24 Jan 2024 06:36) (65 - 117)  BP: 145/65 (24 Jan 2024 05:00) (121/36 - 154/59)  BP(mean): 83 (24 Jan 2024 05:00) (57 - 83)  RR: 27 (24 Jan 2024 05:00) (19 - 27)  SpO2: 100% (24 Jan 2024 06:36) (95% - 100%)    Parameters below as of 24 Jan 2024 05:00  Patient On (Oxygen Delivery Method): ventilator    O2 Concentration (%): 50    CONSTITUTIONAL: somnolent however arousable   RESPIRATORY: Normal respiratory effort; lungs are clear to auscultation bilaterally  CARDIOVASCULAR: Regular rate and rhythm, normal S1 and S2, no murmur/rub/gallop; No lower extremity edema; Peripheral pulses are 2+ bilaterally  ABDOMEN: Nontender to palpation, normoactive bowel sounds, no rebound/guarding; No hepatosplenomegaly  MSK: no clubbing or cyanosis of digits; no joint swelling or tenderness to palpation  PSYCH: n/a     LABS:                        8.2    16.40 )-----------( 305      ( 24 Jan 2024 00:15 )             25.4     01-24    141  |  100  |  31<H>  ----------------------------<  398<H>  3.8   |  29  |  1.66<H>    Ca    8.2<L>      24 Jan 2024 00:15  Phos  1.7     01-24  Mg     2.00     01-24    TPro  6.5  /  Alb  2.5<L>  /  TBili  0.4  /  DBili  x   /  AST  17  /  ALT  16  /  AlkPhos  104  01-24    PT/INR - ( 24 Jan 2024 00:15 )   PT: 12.7 sec;   INR: 1.13 ratio         PTT - ( 24 Jan 2024 00:15 )  PTT:34.3 sec      Urinalysis Basic - ( 24 Jan 2024 00:15 )    Color: x / Appearance: x / SG: x / pH: x  Gluc: 398 mg/dL / Ketone: x  / Bili: x / Urobili: x   Blood: x / Protein: x / Nitrite: x   Leuk Esterase: x / RBC: x / WBC x   Sq Epi: x / Non Sq Epi: x / Bacteria: x        Culture - Bronchial (collected 22 Jan 2024 15:05)  Source: .Bronchial Bronchial  Gram Stain (23 Jan 2024 07:34):    Moderate polymorphonuclear leukocytes seen per low power field    No squamous epithelial cells per low power field    No organisms seen per oil power field  Preliminary Report (23 Jan 2024 18:22):    No growth to date.        Tele Reviewed:    RADIOLOGY & ADDITIONAL TESTS:  Results Reviewed:   Imaging Personally Reviewed:  Electrocardiogram Personally Reviewed:

## 2024-01-25 LAB
ALBUMIN SERPL ELPH-MCNC: 2.7 G/DL — LOW (ref 3.3–5)
ALP SERPL-CCNC: 89 U/L — SIGNIFICANT CHANGE UP (ref 40–120)
ALT FLD-CCNC: 17 U/L — SIGNIFICANT CHANGE UP (ref 4–41)
ANION GAP SERPL CALC-SCNC: 12 MMOL/L — SIGNIFICANT CHANGE UP (ref 7–14)
APTT BLD: 30.4 SEC — SIGNIFICANT CHANGE UP (ref 24.5–35.6)
AST SERPL-CCNC: 20 U/L — SIGNIFICANT CHANGE UP (ref 4–40)
B PERT DNA SPEC QL NAA+PROBE: SIGNIFICANT CHANGE UP
B PERT+PARAPERT DNA PNL SPEC NAA+PROBE: SIGNIFICANT CHANGE UP
BILIRUB SERPL-MCNC: 0.4 MG/DL — SIGNIFICANT CHANGE UP (ref 0.2–1.2)
BORDETELLA PARAPERTUSSIS (RAPRVP): SIGNIFICANT CHANGE UP
BUN SERPL-MCNC: 36 MG/DL — HIGH (ref 7–23)
C PNEUM DNA SPEC QL NAA+PROBE: SIGNIFICANT CHANGE UP
CALCIUM SERPL-MCNC: 8.1 MG/DL — LOW (ref 8.4–10.5)
CHLORIDE SERPL-SCNC: 103 MMOL/L — SIGNIFICANT CHANGE UP (ref 98–107)
CO2 SERPL-SCNC: 28 MMOL/L — SIGNIFICANT CHANGE UP (ref 22–31)
CREAT SERPL-MCNC: 1.52 MG/DL — HIGH (ref 0.5–1.3)
CULTURE RESULTS: SIGNIFICANT CHANGE UP
CULTURE RESULTS: SIGNIFICANT CHANGE UP
EGFR: 43 ML/MIN/1.73M2 — LOW
FLUAV SUBTYP SPEC NAA+PROBE: SIGNIFICANT CHANGE UP
FLUBV RNA SPEC QL NAA+PROBE: SIGNIFICANT CHANGE UP
GLUCOSE BLDC GLUCOMTR-MCNC: 123 MG/DL — HIGH (ref 70–99)
GLUCOSE BLDC GLUCOMTR-MCNC: 158 MG/DL — HIGH (ref 70–99)
GLUCOSE BLDC GLUCOMTR-MCNC: 183 MG/DL — HIGH (ref 70–99)
GLUCOSE BLDC GLUCOMTR-MCNC: 193 MG/DL — HIGH (ref 70–99)
GLUCOSE BLDC GLUCOMTR-MCNC: 48 MG/DL — CRITICAL LOW (ref 70–99)
GLUCOSE SERPL-MCNC: 207 MG/DL — HIGH (ref 70–99)
HADV DNA SPEC QL NAA+PROBE: SIGNIFICANT CHANGE UP
HCOV 229E RNA SPEC QL NAA+PROBE: SIGNIFICANT CHANGE UP
HCOV HKU1 RNA SPEC QL NAA+PROBE: SIGNIFICANT CHANGE UP
HCOV NL63 RNA SPEC QL NAA+PROBE: SIGNIFICANT CHANGE UP
HCOV OC43 RNA SPEC QL NAA+PROBE: SIGNIFICANT CHANGE UP
HCT VFR BLD CALC: 26.7 % — LOW (ref 39–50)
HGB BLD-MCNC: 8.8 G/DL — LOW (ref 13–17)
HMPV RNA SPEC QL NAA+PROBE: SIGNIFICANT CHANGE UP
HPIV1 RNA SPEC QL NAA+PROBE: SIGNIFICANT CHANGE UP
HPIV2 RNA SPEC QL NAA+PROBE: SIGNIFICANT CHANGE UP
HPIV3 RNA SPEC QL NAA+PROBE: SIGNIFICANT CHANGE UP
HPIV4 RNA SPEC QL NAA+PROBE: SIGNIFICANT CHANGE UP
INR BLD: 1.14 RATIO — SIGNIFICANT CHANGE UP (ref 0.85–1.18)
M PNEUMO DNA SPEC QL NAA+PROBE: SIGNIFICANT CHANGE UP
MAGNESIUM SERPL-MCNC: 1.7 MG/DL — SIGNIFICANT CHANGE UP (ref 1.6–2.6)
MCHC RBC-ENTMCNC: 30 PG — SIGNIFICANT CHANGE UP (ref 27–34)
MCHC RBC-ENTMCNC: 33 GM/DL — SIGNIFICANT CHANGE UP (ref 32–36)
MCV RBC AUTO: 91.1 FL — SIGNIFICANT CHANGE UP (ref 80–100)
NRBC # BLD: 0 /100 WBCS — SIGNIFICANT CHANGE UP (ref 0–0)
NRBC # FLD: 0 K/UL — SIGNIFICANT CHANGE UP (ref 0–0)
PHOSPHATE SERPL-MCNC: 1.8 MG/DL — LOW (ref 2.5–4.5)
PLATELET # BLD AUTO: 332 K/UL — SIGNIFICANT CHANGE UP (ref 150–400)
POTASSIUM SERPL-MCNC: 4.1 MMOL/L — SIGNIFICANT CHANGE UP (ref 3.5–5.3)
POTASSIUM SERPL-SCNC: 4.1 MMOL/L — SIGNIFICANT CHANGE UP (ref 3.5–5.3)
PROT SERPL-MCNC: 7.2 G/DL — SIGNIFICANT CHANGE UP (ref 6–8.3)
PROTHROM AB SERPL-ACNC: 12.8 SEC — SIGNIFICANT CHANGE UP (ref 9.5–13)
RAPID RVP RESULT: SIGNIFICANT CHANGE UP
RBC # BLD: 2.93 M/UL — LOW (ref 4.2–5.8)
RBC # FLD: 17.2 % — HIGH (ref 10.3–14.5)
RSV RNA SPEC QL NAA+PROBE: SIGNIFICANT CHANGE UP
RV+EV RNA SPEC QL NAA+PROBE: SIGNIFICANT CHANGE UP
SARS-COV-2 RNA SPEC QL NAA+PROBE: SIGNIFICANT CHANGE UP
SODIUM SERPL-SCNC: 143 MMOL/L — SIGNIFICANT CHANGE UP (ref 135–145)
SPECIMEN SOURCE: SIGNIFICANT CHANGE UP
SPECIMEN SOURCE: SIGNIFICANT CHANGE UP
WBC # BLD: 17.7 K/UL — HIGH (ref 3.8–10.5)
WBC # FLD AUTO: 17.7 K/UL — HIGH (ref 3.8–10.5)

## 2024-01-25 PROCEDURE — 70450 CT HEAD/BRAIN W/O DYE: CPT | Mod: 26

## 2024-01-25 PROCEDURE — 93321 DOPPLER ECHO F-UP/LMTD STD: CPT | Mod: 26,GC

## 2024-01-25 PROCEDURE — 99291 CRITICAL CARE FIRST HOUR: CPT

## 2024-01-25 PROCEDURE — 74177 CT ABD & PELVIS W/CONTRAST: CPT | Mod: 26

## 2024-01-25 PROCEDURE — 99232 SBSQ HOSP IP/OBS MODERATE 35: CPT

## 2024-01-25 PROCEDURE — 76604 US EXAM CHEST: CPT | Mod: 26,GC

## 2024-01-25 PROCEDURE — 70488 CT MAXILLOFACIAL W/O & W/DYE: CPT | Mod: 26

## 2024-01-25 PROCEDURE — 93308 TTE F-UP OR LMTD: CPT | Mod: 26,GC

## 2024-01-25 RX ORDER — FENTANYL CITRATE 50 UG/ML
50 INJECTION INTRAVENOUS ONCE
Refills: 0 | Status: DISCONTINUED | OUTPATIENT
Start: 2024-01-25 | End: 2024-01-25

## 2024-01-25 RX ORDER — DEXMEDETOMIDINE HYDROCHLORIDE IN 0.9% SODIUM CHLORIDE 4 UG/ML
0.2 INJECTION INTRAVENOUS
Qty: 400 | Refills: 0 | Status: DISCONTINUED | OUTPATIENT
Start: 2024-01-25 | End: 2024-01-31

## 2024-01-25 RX ORDER — ACETAMINOPHEN 500 MG
1000 TABLET ORAL ONCE
Refills: 0 | Status: COMPLETED | OUTPATIENT
Start: 2024-01-25 | End: 2024-01-25

## 2024-01-25 RX ORDER — HUMAN INSULIN 100 [IU]/ML
8 INJECTION, SUSPENSION SUBCUTANEOUS ONCE
Refills: 0 | Status: COMPLETED | OUTPATIENT
Start: 2024-01-25 | End: 2024-01-25

## 2024-01-25 RX ORDER — SODIUM,POTASSIUM PHOSPHATES 278-250MG
1 POWDER IN PACKET (EA) ORAL ONCE
Refills: 0 | Status: COMPLETED | OUTPATIENT
Start: 2024-01-25 | End: 2024-01-25

## 2024-01-25 RX ORDER — SODIUM,POTASSIUM PHOSPHATES 278-250MG
1 POWDER IN PACKET (EA) ORAL ONCE
Refills: 0 | Status: DISCONTINUED | OUTPATIENT
Start: 2024-01-25 | End: 2024-01-25

## 2024-01-25 RX ADMIN — PIPERACILLIN AND TAZOBACTAM 25 GRAM(S): 4; .5 INJECTION, POWDER, LYOPHILIZED, FOR SOLUTION INTRAVENOUS at 05:47

## 2024-01-25 RX ADMIN — HUMAN INSULIN 8 UNIT(S): 100 INJECTION, SUSPENSION SUBCUTANEOUS at 06:05

## 2024-01-25 RX ADMIN — Medication 1 DROP(S): at 05:27

## 2024-01-25 RX ADMIN — SODIUM CHLORIDE 4 MILLILITER(S): 9 INJECTION INTRAMUSCULAR; INTRAVENOUS; SUBCUTANEOUS at 03:17

## 2024-01-25 RX ADMIN — HUMAN INSULIN 16 UNIT(S): 100 INJECTION, SUSPENSION SUBCUTANEOUS at 00:19

## 2024-01-25 RX ADMIN — Medication 3 MILLILITER(S): at 21:12

## 2024-01-25 RX ADMIN — PIPERACILLIN AND TAZOBACTAM 25 GRAM(S): 4; .5 INJECTION, POWDER, LYOPHILIZED, FOR SOLUTION INTRAVENOUS at 14:28

## 2024-01-25 RX ADMIN — PANTOPRAZOLE SODIUM 40 MILLIGRAM(S): 20 TABLET, DELAYED RELEASE ORAL at 17:32

## 2024-01-25 RX ADMIN — Medication 1000 MILLIGRAM(S): at 04:30

## 2024-01-25 RX ADMIN — LIDOCAINE 1 PATCH: 4 CREAM TOPICAL at 08:04

## 2024-01-25 RX ADMIN — Medication 3 MILLILITER(S): at 10:26

## 2024-01-25 RX ADMIN — DEXMEDETOMIDINE HYDROCHLORIDE IN 0.9% SODIUM CHLORIDE 3.97 MICROGRAM(S)/KG/HR: 4 INJECTION INTRAVENOUS at 19:45

## 2024-01-25 RX ADMIN — SODIUM CHLORIDE 4 MILLILITER(S): 9 INJECTION INTRAMUSCULAR; INTRAVENOUS; SUBCUTANEOUS at 21:11

## 2024-01-25 RX ADMIN — Medication 400 MILLIGRAM(S): at 14:28

## 2024-01-25 RX ADMIN — SODIUM CHLORIDE 4 MILLILITER(S): 9 INJECTION INTRAMUSCULAR; INTRAVENOUS; SUBCUTANEOUS at 10:26

## 2024-01-25 RX ADMIN — SENNA PLUS 10 MILLILITER(S): 8.6 TABLET ORAL at 21:55

## 2024-01-25 RX ADMIN — PIPERACILLIN AND TAZOBACTAM 25 GRAM(S): 4; .5 INJECTION, POWDER, LYOPHILIZED, FOR SOLUTION INTRAVENOUS at 21:54

## 2024-01-25 RX ADMIN — Medication 81 MILLIGRAM(S): at 11:28

## 2024-01-25 RX ADMIN — HEPARIN SODIUM 5000 UNIT(S): 5000 INJECTION INTRAVENOUS; SUBCUTANEOUS at 14:28

## 2024-01-25 RX ADMIN — DEXMEDETOMIDINE HYDROCHLORIDE IN 0.9% SODIUM CHLORIDE 3.97 MICROGRAM(S)/KG/HR: 4 INJECTION INTRAVENOUS at 01:48

## 2024-01-25 RX ADMIN — Medication 3 MILLILITER(S): at 03:10

## 2024-01-25 RX ADMIN — Medication 400 MILLIGRAM(S): at 04:05

## 2024-01-25 RX ADMIN — CHLORHEXIDINE GLUCONATE 15 MILLILITER(S): 213 SOLUTION TOPICAL at 05:27

## 2024-01-25 RX ADMIN — Medication 1 GRAM(S): at 17:32

## 2024-01-25 RX ADMIN — Medication 2: at 00:16

## 2024-01-25 RX ADMIN — ESCITALOPRAM OXALATE 10 MILLIGRAM(S): 10 TABLET, FILM COATED ORAL at 11:27

## 2024-01-25 RX ADMIN — HEPARIN SODIUM 5000 UNIT(S): 5000 INJECTION INTRAVENOUS; SUBCUTANEOUS at 05:26

## 2024-01-25 RX ADMIN — Medication 1 GRAM(S): at 05:27

## 2024-01-25 RX ADMIN — LEVETIRACETAM 400 MILLIGRAM(S): 250 TABLET, FILM COATED ORAL at 17:49

## 2024-01-25 RX ADMIN — CHLORHEXIDINE GLUCONATE 15 MILLILITER(S): 213 SOLUTION TOPICAL at 17:32

## 2024-01-25 RX ADMIN — LEVETIRACETAM 400 MILLIGRAM(S): 250 TABLET, FILM COATED ORAL at 05:26

## 2024-01-25 RX ADMIN — Medication 3 MILLILITER(S): at 15:16

## 2024-01-25 RX ADMIN — Medication 40 MILLIGRAM(S): at 05:26

## 2024-01-25 RX ADMIN — HUMAN INSULIN 16 UNIT(S): 100 INJECTION, SUSPENSION SUBCUTANEOUS at 17:33

## 2024-01-25 RX ADMIN — Medication 1 DROP(S): at 11:27

## 2024-01-25 RX ADMIN — Medication 1 PACKET(S): at 02:44

## 2024-01-25 RX ADMIN — HEPARIN SODIUM 5000 UNIT(S): 5000 INJECTION INTRAVENOUS; SUBCUTANEOUS at 21:55

## 2024-01-25 RX ADMIN — SODIUM CHLORIDE 4 MILLILITER(S): 9 INJECTION INTRAMUSCULAR; INTRAVENOUS; SUBCUTANEOUS at 15:51

## 2024-01-25 RX ADMIN — LIDOCAINE 1 PATCH: 4 CREAM TOPICAL at 21:19

## 2024-01-25 RX ADMIN — PANTOPRAZOLE SODIUM 40 MILLIGRAM(S): 20 TABLET, DELAYED RELEASE ORAL at 05:27

## 2024-01-25 RX ADMIN — Medication 1000 MILLIGRAM(S): at 15:00

## 2024-01-25 RX ADMIN — DEXMEDETOMIDINE HYDROCHLORIDE IN 0.9% SODIUM CHLORIDE 3.97 MICROGRAM(S)/KG/HR: 4 INJECTION INTRAVENOUS at 08:14

## 2024-01-25 RX ADMIN — LIDOCAINE 1 PATCH: 4 CREAM TOPICAL at 09:05

## 2024-01-25 RX ADMIN — Medication 1 DROP(S): at 17:32

## 2024-01-25 RX ADMIN — Medication 2: at 05:52

## 2024-01-25 RX ADMIN — Medication 2: at 17:34

## 2024-01-25 NOTE — PROGRESS NOTE ADULT - SUBJECTIVE AND OBJECTIVE BOX
Follow Up:    Fever    Interval History/ROS:  Febrile to 101.6 this AM. Patient was seen and examined at bedside. Remains intubated. ROS unable to assess.    Allergies  fluoroquinolone antibiotics (Other)  Cipro (Unknown)  Tegretol (Unknown)  carbamazepine (Other)        ANTIMICROBIALS:  piperacillin/tazobactam IVPB.. 3.375 every 8 hours      OTHER MEDS:  MEDICATIONS  (STANDING):  albuterol/ipratropium for Nebulization 3 every 6 hours  aspirin  chewable 81 daily  dexMEDEtomidine Infusion 0.2 <Continuous>  dextrose 50% Injectable 25 once  dextrose 50% Injectable 25 once  dextrose 50% Injectable 12.5 once  dextrose Oral Gel 15 once PRN  escitalopram 10 daily  glucagon  Injectable 1 once  heparin   Injectable 5000 every 8 hours  insulin lispro (ADMELOG) corrective regimen sliding scale  every 6 hours  insulin NPH human recombinant 16 every 6 hours  levETIRAcetam  IVPB 250 every 12 hours  pantoprazole  Injectable 40 every 12 hours  polyethylene glycol 3350 17 daily  senna Syrup 10 at bedtime  sodium chloride 3%  Inhalation 4 every 6 hours  sucralfate suspension 1 two times a day      Vital Signs Last 24 Hrs  T(C): 38.7 (25 Jan 2024 12:00), Max: 38.7 (25 Jan 2024 12:00)  T(F): 101.6 (25 Jan 2024 12:00), Max: 101.6 (25 Jan 2024 12:00)  HR: 91 (25 Jan 2024 12:00) (91 - 131)  BP: 114/45 (25 Jan 2024 12:00) (111/41 - 155/54)  BP(mean): 62 (25 Jan 2024 12:00) (58 - 107)  RR: 17 (25 Jan 2024 12:00) (17 - 30)  SpO2: 98% (25 Jan 2024 12:00) (93% - 99%)    Parameters below as of 25 Jan 2024 12:00  Patient On (Oxygen Delivery Method): ventilator, CPAP 5/8    O2 Concentration (%): 40    PHYSICAL EXAM:  Constitutional: intubated  HEAD/EYES: L eye w/ subconjunctival hemorrhage  ENT:  +ETT; +NGT  Cardiovascular:   normal S1, S2, no murmur, RRR  Respiratory:  mechanical breath sounds  GI:  soft, nondistended  Skin:  No phlebitis; LUE hematoma w/ warmth                                8.8    17.70 )-----------( 332      ( 25 Jan 2024 01:25 )             26.7       01-25    143  |  103  |  36<H>  ----------------------------<  207<H>  4.1   |  28  |  1.52<H>    Ca    8.1<L>      25 Jan 2024 01:25  Phos  1.8     01-25  Mg     1.70     01-25    TPro  7.2  /  Alb  2.7<L>  /  TBili  0.4  /  DBili  x   /  AST  20  /  ALT  17  /  AlkPhos  89  01-25      Urinalysis Basic - ( 25 Jan 2024 01:25 )    Color: x / Appearance: x / SG: x / pH: x  Gluc: 207 mg/dL / Ketone: x  / Bili: x / Urobili: x   Blood: x / Protein: x / Nitrite: x   Leuk Esterase: x / RBC: x / WBC x   Sq Epi: x / Non Sq Epi: x / Bacteria: x        MICROBIOLOGY:  v  .Bronchial Bronchial  01-22-24   Normal Respiratory Aurelia present  --    Moderate polymorphonuclear leukocytes seen per low power field  No squamous epithelial cells per low power field  No organisms seen per oil power field      .Blood Blood-Peripheral  01-20-24   No growth at 4 days  --  --      .Blood Blood-Peripheral  01-20-24   No growth at 4 days  --  --      .Bronchial R MIDDLE LOBE  01-19-24   Numerous Staphylococcus aureus  Normal Respiratory Aurelia present  --  Staphylococcus aureus          Rapid RVP Result: NotDetec (01-24 @ 22:38)        RADIOLOGY:  Imaging below independently reviewed.  < from: Xray Chest 1 View- PORTABLE-Routine (Xray Chest 1 View- PORTABLE-Routine .) (01.22.24 @ 17:40) >    ACC: [id number] EXAM:  XR CHEST PORTABLE ROUTINE 1V   ORDERED BY: ARABELLA SERRA     PROCEDURE DATE:  01/22/2024          INTERPRETATION:  CLINICAL INFORMATION: Intubation    TIME OF EXAMINATION: January 22, 2024 at 5:25 PM    EXAM: Portable chest    FINDINGS:  The endotracheal tube is seen with its tip at the level of the thoracic   inlet above the fox.  Bilateral airspace opacities improved on the right side with a right   effusion and underlying atelectasis. Findings are consistent with   pulmonary edema.  Heart size is stable.  No pneumothorax.  Enteric tube with tip in the stomach.        COMPARISON: January 21        IMPRESSION: Follow-up with endotracheal tube in place, right effusion and   bilateral opacities consistent with pulmonary edema.    --- End of Report ---            AMELIA ANGLIN MD; Attending Radiologist  This document has been electronically signed. Jan 23 2024 10:31AM    < end of copied text >

## 2024-01-25 NOTE — PROGRESS NOTE ADULT - SUBJECTIVE AND OBJECTIVE BOX
Aashish Boyer MD  Interventional Cardiology / Advance Heart Failure and Cardiac Transplant Specialist  [place] Office : [phone] [place] [address] [place] [address]  Tel: [phone]  [place] Office : [address] [facility] [place] [address]  Tel: [phone]       Pt is lying in bed intubated  	  MEDICATIONS:  aspirin  chewable 81 milliGRAM(s) Enteral Tube daily  heparin   Injectable 5000 Unit(s) SubCutaneous every 8 hours    piperacillin/tazobactam IVPB.. 3.375 Gram(s) IV Intermittent every 8 hours    albuterol/ipratropium for Nebulization 3 milliLiter(s) Nebulizer every 6 hours  sodium chloride 3%  Inhalation 4 milliLiter(s) Inhalation every 6 hours    dexMEDEtomidine Infusion 0.2 MICROgram(s)/kG/Hr IV Continuous <Continuous>  escitalopram 10 milliGRAM(s) Oral daily  levETIRAcetam  IVPB 250 milliGRAM(s) IV Intermittent every 12 hours    pantoprazole  Injectable 40 milliGRAM(s) IV Push every 12 hours  polyethylene glycol 3350 17 Gram(s) Oral daily  senna Syrup 10 milliLiter(s) Oral at bedtime  sucralfate suspension 1 Gram(s) Enteral Tube two times a day    dextrose 50% Injectable 12.5 Gram(s) IV Push once  dextrose 50% Injectable 25 Gram(s) IV Push once  dextrose 50% Injectable 25 Gram(s) IV Push once  dextrose Oral Gel 15 Gram(s) Oral once PRN  glucagon  Injectable 1 milliGRAM(s) IntraMuscular once  insulin lispro (ADMELOG) corrective regimen sliding scale   SubCutaneous every 6 hours  insulin NPH human recombinant 16 Unit(s) SubCutaneous every 6 hours    artificial  tears Solution 1 Drop(s) Left EYE four times a day  chlorhexidine 0.12% Liquid 15 milliLiter(s) Oral Mucosa every 12 hours  chlorhexidine 2% Cloths 1 Application(s) Topical daily  dextrose 5%. 1000 milliLiter(s) IV Continuous <Continuous>  dextrose 5%. 1000 milliLiter(s) IV Continuous <Continuous>  lidocaine   4% Patch 1 Patch Transdermal every 24 hours  petrolatum Ophthalmic Ointment 1 Application(s) Left EYE at bedtime      PAST MEDICAL/SURGICAL HISTORY  PAST MEDICAL & SURGICAL HISTORY:  Hyperlipemia      Hypertension      Coronary Artery Disease      Diabetes Mellitus Type II      Stented Coronary Artery  total 5 stents, last stent 5/2019      Neuropathy      Myocardial infarction      Stroke  mild left facial numbness   no other residuals verbalized      Myoclonic jerking      Stage 3 chronic kidney disease      History of Cataract Extraction      Hx of CABG      H/O coronary angiogram      S/P coronary artery stent placement  1/6/09      S/P placement of cardiac pacemaker          SOCIAL HISTORY: Substance Use (street drugs): ( x ) never used  (  ) other:    FAMILY HISTORY:  No pertinent family history in first degree relatives         PHYSICAL EXAM:  T(C): 36.5 (01-25-24 @ 20:00), Max: 38.7 (01-25-24 @ 12:00)  HR: 80 (01-25-24 @ 21:00) (72 - 131)  BP: 125/54 (01-25-24 @ 20:00) (101/42 - 143/55)  RR: 17 (01-25-24 @ 21:00) (17 - 30)  SpO2: 100% (01-25-24 @ 21:00) (93% - 100%)  Wt(kg): --  I&O's Summary    24 Jan 2024 07:01  -  25 Jan 2024 07:00  --------------------------------------------------------  IN: 1533.5 mL / OUT: 1270 mL / NET: 263.5 mL    25 Jan 2024 07:01  -  25 Jan 2024 21:42  --------------------------------------------------------  IN: 642.5 mL / OUT: 1 mL / NET: 641.5 mL           intubated  EYES:   PERRLA   ENMT:   Moist mucous membranes, Good dentition, No lesions  Cardiovascular: Normal S1 S2, No JVD, No murmurs, No edema  Respiratory: b/l rhocnhi   Gastrointestinal:  Soft, Non-tender, + BS	  Extremities: no edema                                    8.8    17.70 )-----------( 332      ( 25 Jan 2024 01:25 )             26.7     01-25    143  |  103  |  36<H>  ----------------------------<  207<H>  4.1   |  28  |  1.52<H>    Ca    8.1<L>      25 Jan 2024 01:25  Phos  1.8     01-25  Mg     1.70     01-25    TPro  7.2  /  Alb  2.7<L>  /  TBili  0.4  /  DBili  x   /  AST  20  /  ALT  17  /  AlkPhos  89  01-25    proBNP:   Lipid Profile:   HgA1c:   TSH:     Consultant(s) Notes Reviewed:  [x ] YES  [ ] NO    Care Discussed with Consultants/Other Providers [ x] YES  [ ] NO    Imaging Personally Reviewed independently:  [x] YES  [ ] NO    All labs, radiologic studies, vitals, orders and medications list reviewed. Patient is seen and examined at bedside. Case discussed with medical team.

## 2024-01-25 NOTE — CHART NOTE - NSCHARTNOTEFT_GEN_A_CORE
Patient family adamant for ophthalmology eval. Night resident called due to concern with subconjunctival hemorrhage of the left eye. Family member noticed this earlier in the day. States that his father was not opening his eyes significantly, however the sliver he could see was all bloodshot and red. Told by primary team was subconjunctival hemorrhage, but was adamant to have ophthalmology evaluation.    Short bedside exam conducted at 0100 hours 1/25/24. Patient pupils are round and reactive. Able to follow few basic commands, EOM appear grossly intact. Left eye with inferior subconjunctival hemorrhage and mild hematoma interpalpebral limiting full closure of lid.     Recommend artificial tears 4x a day OS  Recommend lacrilube gel qHS OS  Condition is self-limited and family was counseled and reassured.    Reconsult PRN.    Ulises Garcia MD  PGY3 Ophthalmology Resident

## 2024-01-25 NOTE — PROGRESS NOTE ADULT - ASSESSMENT
89-yo M w/ PMH of CAD s/p CABG w/ stents, s/p PPM, DM, PVD, CVA, CKD, and myoclonic jerks, admitted on 12/23 i/s/o recent COVID. Partially treated for COVID w/ Paxlovid. Course complicated by CT C/A/P revealing findings suggestive of acute cholecystitis (12/24/23). 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 S/p exlap, mesenteric angiogram, and SMA stent (12/24). HIDA (12/29) supported the diagnosis of acute cholecystitis. Treated medically (Zosyn 12/24-31). Further c/b acute blood loss anemia 1/1, s/p pRBC. EGD on 1/2 w/ clipping. LUE hematoma noted 1/10 on venous Duplex, w/ visualization of complex, avascular fluid collection (5.3 x 2.7 x 3.7 cm), possibly hematoma. AMS noted on 1/16, w/ CT head finding suggestive of partial opacification of the L middle ear and mastoid air cells. CT chest on 1/16 revealed new small b/l pleural effusions a/w RML and RLL collapse. Patchy b/l GGO, c/f pulm edema. S/p BAL 1/19, revealing NRF w/ Staph aureus. Restarted on Zosyn 1/20. Repeat BAL 1/22 w/ no growth.       Workup:  Covid on 12/23  Blood Cx (12/24, 1/20, 1/22): NGTD  Bronch Cx (119): MSSA, moderate yeast   Legionella urine Ag neg     Spiking fevers on 1/19 and again 1/23  Leukocytosis increased on 1/20 8-->16-->21-->16    Antimicrobials:   Remdesivir 12/25-26  Zosyn 12/25-31, 1/20-->  Cefepime 1/19-20    #Acute hypoxic respiratory failure  #Pleural effusion  #Fever  #Leukocytosis  #MABLE on CKD  #Abnormal CT of head  #Conjunctival hemorrhage  #Hematoma  #Toxic metabolic encephalitis  - AMS and acute hypoxic resp failure, intubated. BAL 1/19 w/ S aureus on Zosyn, now repeat BAL 1/22 w/ no growth  - Recent COVID. Superimposed infection can be within differential.  - CT max/face and CTAP w/ contrast to assess for infectious foci, especially w/ the new L conjunctival hemorrhage.  - F/u serum Fungitell, galactomannan, and BCx  - Consider LUE hematoma evacuation and culture  - Continue with Zosyn at this time  - Would taper off to d/c steroids.  - Monitor fever curve and WBC. Wean off oxygen per MICU management.    Plan discussed with MICU attending Dr. Wolfe and ID fellow.  Thank you for this consult. Inpatient ID team will follow.    Cheo Kelly MD, PhD  Attending Physician  Division of Infectious Diseases  Department of Medicine    Please contact through MS Teams message.  Office: [phone] (after 5 PM or weekend) .   89-yo M w/ PMH of CAD s/p CABG w/ stents, s/p PPM, DM, PVD, CVA, CKD, and myoclonic jerks, admitted on 12/23 i/s/o recent COVID. Partially treated for COVID w/ Paxlovid. Course complicated by CT C/A/P revealing findings suggestive of acute cholecystitis (12/24/23). 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 S/p exlap, mesenteric angiogram, and SMA stent (12/24). HIDA (12/29) supported the diagnosis of acute cholecystitis. Treated medically (Zosyn 12/24-31). Further c/b acute blood loss anemia 1/1, s/p pRBC. EGD on 1/2 w/ clipping. LUE hematoma noted 1/10 on venous Duplex, w/ visualization of complex, avascular fluid collection (5.3 x 2.7 x 3.7 cm), possibly hematoma. AMS noted on 1/16, w/ CT head finding suggestive of partial opacification of the L middle ear and mastoid air cells. CT chest on 1/16 revealed new small b/l pleural effusions a/w RML and RLL collapse. Patchy b/l GGO, c/f pulm edema. S/p BAL 1/19, revealing NRF w/ Staph aureus. Restarted on Zosyn 1/20. Repeat BAL 1/22 w/ no growth.       Workup:  Covid on 12/23  Blood Cx (12/24, 1/20, 1/22): NGTD  Bronch Cx (119): MSSA, moderate yeast   Legionella urine Ag neg     Spiking fevers on 1/19 and again 1/23. Febrile until 1/25.  Leukocytosis increased on 1/20 8-->16-->21-->16-->17    Antimicrobials:   Remdesivir 12/25-26  Zosyn 12/25-31, 1/20-->  Cefepime 1/19-20    #Acute hypoxic respiratory failure  #Pleural effusion  #Fever  #Leukocytosis  #MABLE on CKD  #Abnormal CT of head  #Conjunctival hemorrhage  #Hematoma  #Toxic metabolic encephalitis  - AMS and acute hypoxic resp failure, intubated. BAL 1/19 w/ S aureus on Zosyn, now repeat BAL 1/22 w/ no growth  - Recent COVID. Superimposed infection can be within differential.  - CT max/face and CTAP w/ contrast to assess for infectious foci, especially w/ the new L conjunctival hemorrhage.  - F/u serum Fungitell, galactomannan, and BCx  - Consider LUE hematoma evacuation and culture  - Continue with Zosyn at this time  - Would taper off to d/c steroids.  - Monitor fever curve and WBC. Wean off oxygen per MICU management.    Plan discussed with MICU team.  Thank you for this consult. Inpatient ID team will follow.    Cheo Kelly MD, PhD  Attending Physician  Division of Infectious Diseases  Department of Medicine    Please contact through MS Teams message.  Office: [phone] (after 5 PM or weekend) .

## 2024-01-25 NOTE — CHART NOTE - NSCHARTNOTEFT_GEN_A_CORE
: Vincenzo Delgadillo    INDICATION: Critical Illness    PROCEDURE:  [x ] LIMITED ECHO  [x ] LIMITED CHEST  [ ] LIMITED RETROPERITONEAL  [ ] LIMITED ABDOMINAL  [ ] LIMITED DVT  [ ] NEEDLE GUIDANCE VASCULAR  [ ] NEEDLE GUIDANCE THORACENTESIS  [ ] NEEDLE GUIDANCE PARACENTESIS  [ ] NEEDLE GUIDANCE PERICARDIOCENTESIS  [ ] OTHER    FINDINGS:  - B-lines present R>L in the lungs. Effusions are now small and improved from prior imaging. Lower lungs are consolidated    - LV function appears mildly reduced; estimated EF at 40%. LVOT VTi of 12.6cm, AV Vmax of 2 m/s.    INTERPRETATION: No signs of aortic stenosis. Pleural effusions have improved from previous exams.        Images uploaded to [employer] : Vincenzo Delgadillo    INDICATION: Critical Illness, acute hypoxemic respiratory failure     PROCEDURE:  [x ] LIMITED ECHO  [x ] LIMITED CHEST  [ ] LIMITED RETROPERITONEAL  [ ] LIMITED ABDOMINAL  [ ] LIMITED DVT  [ ] NEEDLE GUIDANCE VASCULAR  [ ] NEEDLE GUIDANCE THORACENTESIS  [ ] NEEDLE GUIDANCE PARACENTESIS  [ ] NEEDLE GUIDANCE PERICARDIOCENTESIS  [ ] OTHER    FINDINGS:  - B-lines present R>L in the lungs. Effusions are now small and improved from prior imaging. Lower lungs are consolidated    - LV function appears mildly reduced;  LVOT VTi of 12.6cm by PW doppler, AV Vmax of 2 m/s.    INTERPRETATION: LVSF mildly reduced. Pleural effusions have improved from previous exams.    Images uploaded to [employer]    Attending Attestation:  I was present during the key portions of the procedure and immediately available during the entire procedure.  Aiden Lemon MD  Attending  Pulmonary & Critical Care Medicine

## 2024-01-25 NOTE — PROGRESS NOTE ADULT - SUBJECTIVE AND OBJECTIVE BOX
PROGRESS NOTE:   Authored by Dr. Beata Wahl MD (PGY-1). Pager [facility] [phone] / KATHIA ( [id number]) or via TEAMS    Patient is a 89y old  Male who presents with a chief complaint of COVID19, Chest pain (24 Jan 2024 13:25)      SUBJECTIVE / OVERNIGHT EVENTS:  No acute events overnight.     ADDITIONAL REVIEW OF SYSTEMS:  Patient denies fevers, chills, chest pain, shortness of breath, nausea, abdominal pain, diarrhea, constipation, dysuria, leg swelling, headache, light headedness.    MEDICATIONS  (STANDING):  albuterol/ipratropium for Nebulization 3 milliLiter(s) Nebulizer every 6 hours  amLODIPine   Tablet 5 milliGRAM(s) Oral daily  artificial  tears Solution 1 Drop(s) Left EYE four times a day  aspirin  chewable 81 milliGRAM(s) Enteral Tube daily  chlorhexidine 0.12% Liquid 15 milliLiter(s) Oral Mucosa every 12 hours  chlorhexidine 2% Cloths 1 Application(s) Topical daily  dexMEDEtomidine Infusion 0.2 MICROgram(s)/kG/Hr (3.97 mL/Hr) IV Continuous <Continuous>  dextrose 5%. 1000 milliLiter(s) (100 mL/Hr) IV Continuous <Continuous>  dextrose 5%. 1000 milliLiter(s) (50 mL/Hr) IV Continuous <Continuous>  dextrose 50% Injectable 12.5 Gram(s) IV Push once  dextrose 50% Injectable 25 Gram(s) IV Push once  dextrose 50% Injectable 25 Gram(s) IV Push once  escitalopram 10 milliGRAM(s) Oral daily  glucagon  Injectable 1 milliGRAM(s) IntraMuscular once  heparin   Injectable 5000 Unit(s) SubCutaneous every 8 hours  insulin lispro (ADMELOG) corrective regimen sliding scale   SubCutaneous every 6 hours  insulin NPH human recombinant 16 Unit(s) SubCutaneous every 6 hours  levETIRAcetam  IVPB 250 milliGRAM(s) IV Intermittent every 12 hours  lidocaine   4% Patch 1 Patch Transdermal every 24 hours  methylPREDNISolone sodium succinate Injectable 40 milliGRAM(s) IV Push daily  pantoprazole  Injectable 40 milliGRAM(s) IV Push every 12 hours  petrolatum Ophthalmic Ointment 1 Application(s) Left EYE at bedtime  piperacillin/tazobactam IVPB.. 3.375 Gram(s) IV Intermittent every 8 hours  polyethylene glycol 3350 17 Gram(s) Oral daily  senna Syrup 10 milliLiter(s) Oral at bedtime  sodium chloride 3%  Inhalation 4 milliLiter(s) Inhalation every 6 hours  sucralfate suspension 1 Gram(s) Enteral Tube two times a day    MEDICATIONS  (PRN):  dextrose Oral Gel 15 Gram(s) Oral once PRN Blood Glucose LESS THAN 70 milliGRAM(s)/deciliter      CAPILLARY BLOOD GLUCOSE      POCT Blood Glucose.: 158 mg/dL (25 Jan 2024 05:24)  POCT Blood Glucose.: 193 mg/dL (25 Jan 2024 00:08)  POCT Blood Glucose.: 232 mg/dL (24 Jan 2024 17:55)  POCT Blood Glucose.: 203 mg/dL (24 Jan 2024 12:27)    I&O's Summary    24 Jan 2024 07:01  -  25 Jan 2024 07:00  --------------------------------------------------------  IN: 1523.6 mL / OUT: 1270 mL / NET: 253.6 mL        PHYSICAL EXAM:  Vital Signs Last 24 Hrs  T(C): 38.3 (25 Jan 2024 04:00), Max: 38.6 (24 Jan 2024 08:00)  T(F): 101 (25 Jan 2024 04:00), Max: 101.4 (24 Jan 2024 08:00)  HR: 113 (25 Jan 2024 06:27) (92 - 131)  BP: 113/42 (25 Jan 2024 06:00) (111/41 - 155/54)  BP(mean): 59 (25 Jan 2024 06:00) (58 - 122)  RR: 26 (25 Jan 2024 06:00) (15 - 30)  SpO2: 95% (25 Jan 2024 06:27) (93% - 100%)    Parameters below as of 25 Jan 2024 06:00  Patient On (Oxygen Delivery Method): ventilator    O2 Concentration (%): 40    CONSTITUTIONAL: NAD, well-developed  RESPIRATORY: Normal respiratory effort; lungs are clear to auscultation bilaterally  CARDIOVASCULAR: Regular rate and rhythm, normal S1 and S2, no murmur/rub/gallop; No lower extremity edema; Peripheral pulses are 2+ bilaterally  ABDOMEN: Nontender to palpation, normoactive bowel sounds, no rebound/guarding; No hepatosplenomegaly  MSK: no clubbing or cyanosis of digits; no joint swelling or tenderness to palpation  PSYCH: A+O to person, place, and time; affect appropriate    LABS:                        8.8    17.70 )-----------( 332      ( 25 Jan 2024 01:25 )             26.7     01-25    143  |  103  |  36<H>  ----------------------------<  207<H>  4.1   |  28  |  1.52<H>    Ca    8.1<L>      25 Jan 2024 01:25  Phos  1.8     01-25  Mg     1.70     01-25    TPro  7.2  /  Alb  2.7<L>  /  TBili  0.4  /  DBili  x   /  AST  20  /  ALT  17  /  AlkPhos  89  01-25    PT/INR - ( 25 Jan 2024 01:25 )   PT: 12.8 sec;   INR: 1.14 ratio         PTT - ( 25 Jan 2024 01:25 )  PTT:30.4 sec      Urinalysis Basic - ( 25 Jan 2024 01:25 )    Color: x / Appearance: x / SG: x / pH: x  Gluc: 207 mg/dL / Ketone: x  / Bili: x / Urobili: x   Blood: x / Protein: x / Nitrite: x   Leuk Esterase: x / RBC: x / WBC x   Sq Epi: x / Non Sq Epi: x / Bacteria: x        Culture - Bronchial (collected 22 Jan 2024 15:05)  Source: .Bronchial Bronchial  Gram Stain (23 Jan 2024 07:34):    Moderate polymorphonuclear leukocytes seen per low power field    No squamous epithelial cells per low power field    No organisms seen per oil power field  Final Report (24 Jan 2024 15:30):    Normal Respiratory Aurelia present        Tele Reviewed:    RADIOLOGY & ADDITIONAL TESTS:  Results Reviewed:   Imaging Personally Reviewed:  Electrocardiogram Personally Reviewed:     PROGRESS NOTE:   Authored by Dr. Beata Wahl MD (PGY-1). Pager [facility] [phone] / KATHIA ( [phone]) or via TEAMS    Patient is a 89y old  Male who presents with a chief complaint of COVID19, Chest pain (24 Jan 2024 13:25)      SUBJECTIVE / OVERNIGHT EVENTS:  No acute events overnight. Seen and evaluated by optho for left eye bleeding. This AM on precedex as requested by family overnight. Having BM. Febrile to 101 overnight.       MEDICATIONS  (STANDING):  albuterol/ipratropium for Nebulization 3 milliLiter(s) Nebulizer every 6 hours  amLODIPine   Tablet 5 milliGRAM(s) Oral daily  artificial  tears Solution 1 Drop(s) Left EYE four times a day  aspirin  chewable 81 milliGRAM(s) Enteral Tube daily  chlorhexidine 0.12% Liquid 15 milliLiter(s) Oral Mucosa every 12 hours  chlorhexidine 2% Cloths 1 Application(s) Topical daily  dexMEDEtomidine Infusion 0.2 MICROgram(s)/kG/Hr (3.97 mL/Hr) IV Continuous <Continuous>  dextrose 5%. 1000 milliLiter(s) (100 mL/Hr) IV Continuous <Continuous>  dextrose 5%. 1000 milliLiter(s) (50 mL/Hr) IV Continuous <Continuous>  dextrose 50% Injectable 12.5 Gram(s) IV Push once  dextrose 50% Injectable 25 Gram(s) IV Push once  dextrose 50% Injectable 25 Gram(s) IV Push once  escitalopram 10 milliGRAM(s) Oral daily  glucagon  Injectable 1 milliGRAM(s) IntraMuscular once  heparin   Injectable 5000 Unit(s) SubCutaneous every 8 hours  insulin lispro (ADMELOG) corrective regimen sliding scale   SubCutaneous every 6 hours  insulin NPH human recombinant 16 Unit(s) SubCutaneous every 6 hours  levETIRAcetam  IVPB 250 milliGRAM(s) IV Intermittent every 12 hours  lidocaine   4% Patch 1 Patch Transdermal every 24 hours  methylPREDNISolone sodium succinate Injectable 40 milliGRAM(s) IV Push daily  pantoprazole  Injectable 40 milliGRAM(s) IV Push every 12 hours  petrolatum Ophthalmic Ointment 1 Application(s) Left EYE at bedtime  piperacillin/tazobactam IVPB.. 3.375 Gram(s) IV Intermittent every 8 hours  polyethylene glycol 3350 17 Gram(s) Oral daily  senna Syrup 10 milliLiter(s) Oral at bedtime  sodium chloride 3%  Inhalation 4 milliLiter(s) Inhalation every 6 hours  sucralfate suspension 1 Gram(s) Enteral Tube two times a day    MEDICATIONS  (PRN):  dextrose Oral Gel 15 Gram(s) Oral once PRN Blood Glucose LESS THAN 70 milliGRAM(s)/deciliter      CAPILLARY BLOOD GLUCOSE      POCT Blood Glucose.: 158 mg/dL (25 Jan 2024 05:24)  POCT Blood Glucose.: 193 mg/dL (25 Jan 2024 00:08)  POCT Blood Glucose.: 232 mg/dL (24 Jan 2024 17:55)  POCT Blood Glucose.: 203 mg/dL (24 Jan 2024 12:27)    I&O's Summary    24 Jan 2024 07:01  -  25 Jan 2024 07:00  --------------------------------------------------------  IN: 1523.6 mL / OUT: 1270 mL / NET: 253.6 mL        PHYSICAL EXAM:  Vital Signs Last 24 Hrs  T(C): 38.3 (25 Jan 2024 04:00), Max: 38.6 (24 Jan 2024 08:00)  T(F): 101 (25 Jan 2024 04:00), Max: 101.4 (24 Jan 2024 08:00)  HR: 113 (25 Jan 2024 06:27) (92 - 131)  BP: 113/42 (25 Jan 2024 06:00) (111/41 - 155/54)  BP(mean): 59 (25 Jan 2024 06:00) (58 - 122)  RR: 26 (25 Jan 2024 06:00) (15 - 30)  SpO2: 95% (25 Jan 2024 06:27) (93% - 100%)    Parameters below as of 25 Jan 2024 06:00  Patient On (Oxygen Delivery Method): ventilator    O2 Concentration (%): 40    CONSTITUTIONAL: intubated, sedated on Precedex minimally responsive   RESPIRATORY: Normal respiratory effort; lungs mild rhonchi bilaterally   CARDIOVASCULAR: Regular rate and rhythm, normal S1 and S2, no murmur/rub/gallop; No lower extremity edema; Peripheral pulses are 2+ bilaterally  ABDOMEN: Nontender to palpation, normoactive bowel sounds, no rebound/guarding; No hepatosplenomegaly  MSK: no clubbing or cyanosis of digits; no joint swelling or tenderness to palpation  PSYCH: sedated     LABS:                        8.8    17.70 )-----------( 332      ( 25 Jan 2024 01:25 )             26.7     01-25    143  |  103  |  36<H>  ----------------------------<  207<H>  4.1   |  28  |  1.52<H>    Ca    8.1<L>      25 Jan 2024 01:25  Phos  1.8     01-25  Mg     1.70     01-25    TPro  7.2  /  Alb  2.7<L>  /  TBili  0.4  /  DBili  x   /  AST  20  /  ALT  17  /  AlkPhos  89  01-25    PT/INR - ( 25 Jan 2024 01:25 )   PT: 12.8 sec;   INR: 1.14 ratio         PTT - ( 25 Jan 2024 01:25 )  PTT:30.4 sec      Urinalysis Basic - ( 25 Jan 2024 01:25 )    Color: x / Appearance: x / SG: x / pH: x  Gluc: 207 mg/dL / Ketone: x  / Bili: x / Urobili: x   Blood: x / Protein: x / Nitrite: x   Leuk Esterase: x / RBC: x / WBC x   Sq Epi: x / Non Sq Epi: x / Bacteria: x        Culture - Bronchial (collected 22 Jan 2024 15:05)  Source: .Bronchial Bronchial  Gram Stain (23 Jan 2024 07:34):    Moderate polymorphonuclear leukocytes seen per low power field    No squamous epithelial cells per low power field    No organisms seen per oil power field  Final Report (24 Jan 2024 15:30):    Normal Respiratory Aurelia present        Tele Reviewed:    RADIOLOGY & ADDITIONAL TESTS:  Results Reviewed:   Imaging Personally Reviewed:  Electrocardiogram Personally Reviewed:     No

## 2024-01-25 NOTE — PROGRESS NOTE ADULT - ATTENDING COMMENTS
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    #Acute hypoxemic respiratory failure  #Staph Aureus Pneumonia  #Dysphagia  - c/w sedation for now, will lighten as tolerated, may need EEG  - c/w full vent support for MSSA pna and aspiration pneumonia, PS trials as tolerated  - c/w tube feeds   - c/w zosyn, f/u bronch cultures  - spoke with vascular (still awaiting cardiology call back, multiple attempts made with no response); okay to stop Brilinta from vascular standpoint for possible procedure; will stop Brilinta given family concern for need for thora (cardiac stents >1 year ago); of note, I do not think bilateral thoracentesis will help prevent recurrent aspiration pneumonias, which is why he was reintubated; also of note, effusions appear much smaller on pocus today so do not think thoracentesis is needed  - wean steroids today  - ID reccs: will get CT max/fac and c/a/p w/contrast (family agreeable to contrast knowing risks/benefits, including renal failure)

## 2024-01-25 NOTE — PROGRESS NOTE ADULT - ASSESSMENT
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) who presents to the hospital for COVID19 infection and chest pain  MABLE ----improving   Hypophosphatemia  Hypokalemia - improving sp repletion  Hypertensive urgency -resolved --- BP meds as ordered  Pulm - resp failure - intubated  EEG pending     1 Renal - crt stable,  lasix 40mg IV once  PRN   hypervolumic   a/w phosnak 1packet x 3 doses   4 CV - BP controlled   5 Vasc - s/p SMA stent placement;   6 Sx - s/p HIDA + for acute asa, medical management, on puree diet , encourage free water in take too (if allowed  with aspiration precautions)   7 GI - s/p EGD clipping of bleeding duodenal ulcers   8 Anemia- hgb stable   9 Pul - intubated  [facility]   10- ID follow up        [facility]  Office: [phone]   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) who presents to the hospital for COVID19 infection and chest pain  MABLE ----improving   Hypophosphatemia  Hypokalemia - improving sp repletion  Hypertensive urgency -resolved --- BP meds as ordered  Pulm - resp failure - intubated  EEG pending     1 Renal - crt stable,  lasix 40mg IV once  PRN , in case of distress  no objection to CT with contrast renal fx at base line  give neutra phos 2 packet ( additional)  4 CV - BP controlled   5 Vasc - s/p SMA stent placement;   6 Sx - s/p HIDA + for acute asa, medical management, .tube feedings   7 GI - s/p EGD clipping of bleeding duodenal ulcers   8 Anemia- hgb stable   9 Pul - intubated  [facility]   10- ID follow up        [facility]  [facility]  Office: [phone]

## 2024-01-25 NOTE — PROGRESS NOTE ADULT - SUBJECTIVE AND OBJECTIVE BOX
NEPHROLOGY     Patient seen and examined with family at bedside, intubated, off sedation,      MEDICATIONS  (STANDING):  albuterol/ipratropium for Nebulization 3 milliLiter(s) Nebulizer every 6 hours  amLODIPine   Tablet 5 milliGRAM(s) Oral daily  aspirin  chewable 81 milliGRAM(s) Oral daily  chlorhexidine 0.12% Liquid 15 milliLiter(s) Oral Mucosa every 12 hours  chlorhexidine 2% Cloths 1 Application(s) Topical daily  dextrose 5%. 1000 milliLiter(s) (100 mL/Hr) IV Continuous <Continuous>  dextrose 5%. 1000 milliLiter(s) (50 mL/Hr) IV Continuous <Continuous>  dextrose 50% Injectable 12.5 Gram(s) IV Push once  dextrose 50% Injectable 25 Gram(s) IV Push once  dextrose 50% Injectable 25 Gram(s) IV Push once  escitalopram 10 milliGRAM(s) Oral daily  glucagon  Injectable 1 milliGRAM(s) IntraMuscular once  heparin   Injectable 5000 Unit(s) SubCutaneous every 8 hours  insulin lispro (ADMELOG) corrective regimen sliding scale   SubCutaneous every 6 hours  insulin NPH human recombinant 16 Unit(s) SubCutaneous every 6 hours  levETIRAcetam  IVPB 250 milliGRAM(s) IV Intermittent every 12 hours  lidocaine   4% Patch 1 Patch Transdermal every 24 hours  methylPREDNISolone sodium succinate Injectable 40 milliGRAM(s) IV Push daily  pantoprazole  Injectable 40 milliGRAM(s) IV Push every 12 hours  piperacillin/tazobactam IVPB.. 3.375 Gram(s) IV Intermittent every 8 hours  polyethylene glycol 3350 17 Gram(s) Oral daily  potassium phosphate / sodium phosphate Powder (PHOS-NaK) 1 Packet(s) Oral three times a day  ranolazine 500 milliGRAM(s) Oral two times a day  senna 2 Tablet(s) Oral at bedtime  sodium chloride 3%  Inhalation 4 milliLiter(s) Inhalation every 6 hours  sucralfate suspension 1 Gram(s) Oral two times a day    VITALS:  T(C): , Max: 38.6 (01-24-24 @ 08:00)  T(F): , Max: 101.4 (01-24-24 @ 08:00)  HR: 109 (01-24-24 @ 11:28)  BP: 140/57 (01-24-24 @ 11:00)  BP(mean): 77 (01-24-24 @ 11:00)  RR: 23 (01-24-24 @ 11:00)  SpO2: 99% (01-24-24 @ 11:28)    I and O's:    01-23 @ 07:01  -  01-24 @ 07:00  --------------------------------------------------------  IN: 1492.3 mL / OUT: 2460 mL / NET: -967.7 mL    PHYSICAL EXAM:  Constitutional: intubated  Neck: No JVD  Respiratory: diminished bs   Cardiovascular: tachy   Gastrointestinal: BS+, soft, NT/ND  Extremities: + le edema   Neurological: uto  : no edlong      LABS:                        8.2    16.40 )-----------( 305      ( 24 Jan 2024 00:15 )             25.4     01-24    141  |  100  |  31<H>  ----------------------------<  398<H>  3.8   |  29  |  1.66<H>    Ca    8.2<L>      24 Jan 2024 00:15  Phos  1.7     01-24  Mg     2.00     01-24    TPro  6.5  /  Alb  2.5<L>  /  TBili  0.4  /  DBili  x   /  AST  17  /  ALT  16  /  AlkPhos  104  01-24     NEPHROLOGY     Patient seen and examined with family at bedside, intubated, off sedation,      MEDICATIONS  (STANDING):  albuterol/ipratropium for Nebulization 3 milliLiter(s) Nebulizer every 6 hours  amLODIPine   Tablet 5 milliGRAM(s) Oral daily  aspirin  chewable 81 milliGRAM(s) Oral daily  chlorhexidine 0.12% Liquid 15 milliLiter(s) Oral Mucosa every 12 hours  chlorhexidine 2% Cloths 1 Application(s) Topical daily  dextrose 5%. 1000 milliLiter(s) (100 mL/Hr) IV Continuous <Continuous>  dextrose 5%. 1000 milliLiter(s) (50 mL/Hr) IV Continuous <Continuous>  dextrose 50% Injectable 12.5 Gram(s) IV Push once  dextrose 50% Injectable 25 Gram(s) IV Push once  dextrose 50% Injectable 25 Gram(s) IV Push once  escitalopram 10 milliGRAM(s) Oral daily  glucagon  Injectable 1 milliGRAM(s) IntraMuscular once  heparin   Injectable 5000 Unit(s) SubCutaneous every 8 hours  insulin lispro (ADMELOG) corrective regimen sliding scale   SubCutaneous every 6 hours  insulin NPH human recombinant 16 Unit(s) SubCutaneous every 6 hours  levETIRAcetam  IVPB 250 milliGRAM(s) IV Intermittent every 12 hours  lidocaine   4% Patch 1 Patch Transdermal every 24 hours  methylPREDNISolone sodium succinate Injectable 40 milliGRAM(s) IV Push daily  pantoprazole  Injectable 40 milliGRAM(s) IV Push every 12 hours  piperacillin/tazobactam IVPB.. 3.375 Gram(s) IV Intermittent every 8 hours  polyethylene glycol 3350 17 Gram(s) Oral daily  potassium phosphate / sodium phosphate Powder (PHOS-NaK) 1 Packet(s) Oral three times a day  ranolazine 500 milliGRAM(s) Oral two times a day  senna 2 Tablet(s) Oral at bedtime  sodium chloride 3%  Inhalation 4 milliLiter(s) Inhalation every 6 hours  sucralfate suspension 1 Gram(s) Oral two times a day    VITALS:  T(C): , Max: 38.6 (01-24-24 @ 08:00)  T(F): , Max: 101.4 (01-24-24 @ 08:00)  HR: 109 (01-24-24 @ 11:28)  BP: 140/57 (01-24-24 @ 11:00)  BP(mean): 77 (01-24-24 @ 11:00)  RR: 23 (01-24-24 @ 11:00)  SpO2: 99% (01-24-24 @ 11:28)    I and O's:    01-23 @ 07:01  -  01-24 @ 07:00  --------------------------------------------------------  IN: 1492.3 mL / OUT: 2460 mL / NET: -967.7 mL    PHYSICAL EXAM:  Constitutional: intubated  Neck: No JVD  Respiratory: diminished bs   Cardiovascular: tachy   Gastrointestinal: BS+, soft, NT/ND  Extremities: + le edema   Neurological: uto  : sindi      LABS:                        8.2    16.40 )-----------( 305      ( 24 Jan 2024 00:15 )             25.4     01-24    141  |  100  |  31<H>  ----------------------------<  398<H>  3.8   |  29  |  1.66<H>    Ca    8.2<L>      24 Jan 2024 00:15  Phos  1.7     01-24  Mg     2.00     01-24    TPro  6.5  /  Alb  2.5<L>  /  TBili  0.4  /  DBili  x   /  AST  17  /  ALT  16  /  AlkPhos  104  01-24

## 2024-01-25 NOTE — PROGRESS NOTE ADULT - ASSESSMENT
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     PLAN  =====Neurologic=====  #CVA  - prior CVA x3 with no residual deficits  #myoclonic jerks  - on Keppra will continue  - holding home  gabapentin and memantine in setting of somnolence  - continue lexapro      =====Pulmonary=====  #COVID  - s/p paxlovid and 1 dose remdesivir while inpatient  #Pleural effusions b/l  - CT chest from 1/16 showing new small bilateral pleural effusions with associated complete collapse  of the right middle and right lower lobes and partial atelectasis of the  right upper and left lower lobes. patchy bilateral ground-glass opacities are consistent with pulmonary edema. layering secretions in the trachea, left mainstem bronchus, and right lower lobe bronchus  - currently on zosyn for aspiration PNA  - s/p bronch for increasing secretions- on Mucomyst, hypertonic saline, albuterol, guaifenesin, mometasone will continue   - bronchial cultures with staph aureus sensitive to bactrim/ Unasyn consider switching?   - intubated on 1/22 for airway support, currently off sedatives attempt to extubate     =====Cardiovascular=====  Patient does not currently require vasopressors and is normotensive  #HTN  - on home amlodipine continue  - restart home metoprolol as tolerated     #CAD  - on Brilinta aspirin and ranolazine continue   - as per vascular okay to hold Brilinta for possible pleural effusion thoracentesis  - awaiting call back from cardiology regarding Brilinta / last stent 2022     =====GI=====  #upper GI bleed  - s/p EGD showing dieulafoy lesion and esophagitis likely 2/2 NGT irritation s/p 2 clips  - on protonix IV BID continue     #Diet  - tube feeds NGT     =====Renal/=====  #Electrolytes  - Maintain K>4, Phos>3, Mag>2, iCal>1  - baseline cr 1.5, at baseline      =====Endocrine=====  #DM2  - on NPH 16 units q6 and SSI given solumedrol   - a1c 9.3, glucose wnl inpatient     =====Infectious Disease=====  #COVID   - s/p paxlovid and 1 dose remdesivir  # PNA  - CT chest showing ground glass opacities  - treatment with azithro and cefepime currently- switch to zosyn on 1/20 for aspiration coverage, dc azithro   - f/u urine legionella  - no white count or fever thus far  - f/u bronchial cultures - staph aureus   - f/u blood cultures given fever overnight on 1/20- NGTD     =====Heme/Onc=====  #DVT Ppx  - heparin sub-q    =====Ethics=====  FULL CODE. 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     PLAN  =====Neurologic=====  #CVA  - prior CVA x3 with no residual deficits  #myoclonic jerks  - on Keppra will continue  - holding home  gabapentin and memantine in setting of somnolence  - continue lexapro      =====Pulmonary=====  #COVID  - s/p paxlovid and 1 dose remdesivir while inpatient  #Pleural effusions b/l  - CT chest from 1/16 showing new small bilateral pleural effusions with associated complete collapse  of the right middle and right lower lobes and partial atelectasis of the  right upper and left lower lobes. patchy bilateral ground-glass opacities are consistent with pulmonary edema. layering secretions in the trachea, left mainstem bronchus, and right lower lobe bronchus  - currently on zosyn for aspiration PNA  - s/p bronch for increasing secretions- on Mucomyst, hypertonic saline, albuterol, guaifenesin, mometasone will continue   - bronchial cultures with staph aureus sensitive to bactrim/ Unasyn consider switching?   - intubated on 1/22 for airway support, currently off sedatives attempt to extubate   - on solumedrol 40mg since 1/22- consider weaning     =====Cardiovascular=====  Patient does not currently require vasopressors and is normotensive  #HTN  - on home amlodipine continue  - restart home metoprolol as tolerated     #CAD  - on Brilinta aspirin and ranolazine continue   - as per vascular okay to hold Brilinta for possible pleural effusion thoracentesis  - awaiting call back from cardiology regarding Brilinta / last stent 2022     =====GI=====  #upper GI bleed  - s/p EGD showing dieulafoy lesion and esophagitis likely 2/2 NGT irritation s/p 2 clips  - on protonix IV BID continue     #Diet  - tube feeds NGT     =====Renal/=====  #Electrolytes  - Maintain K>4, Phos>3, Mag>2, iCal>1  - baseline cr 1.5, at baseline      =====Endocrine=====  #DM2  - on NPH 16 units q6 and SSI given solumedrol   - a1c 9.3, glucose wnl inpatient     =====Infectious Disease=====  #COVID   - s/p paxlovid and 1 dose remdesivir  # PNA  - CT chest showing ground glass opacities  - treatment with azithro and cefepime currently- switch to zosyn on 1/20 for aspiration coverage, dc azithro   - f/u urine legionella  - no white count or fever thus far  - f/u bronchial cultures - staph aureus   - f/u blood cultures given fever overnight on 1/20- NGTD   - ID consult 1/24- pending CT maxillofacial, repeat blood cultures    =====Heme/Onc=====  #DVT Ppx  - heparin sub-q    =====Ethics=====  FULL CODE. 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     PLAN  =====Neurologic=====  #CVA  - prior CVA x3 with no residual deficits  #myoclonic jerks  - on Keppra will continue  - holding home  gabapentin and memantine in setting of somnolence  - continue lexapro  - wean precedex       =====Pulmonary=====  #COVID  - s/p paxlovid and 1 dose remdesivir while inpatient  #Pleural effusions b/l  - CT chest from 1/16 showing new small bilateral pleural effusions with associated complete collapse  of the right middle and right lower lobes and partial atelectasis of the  right upper and left lower lobes. patchy bilateral ground-glass opacities are consistent with pulmonary edema. layering secretions in the trachea, left mainstem bronchus, and right lower lobe bronchus  - currently on zosyn for aspiration PNA  - s/p bronch for increasing secretions- on Mucomyst, hypertonic saline, albuterol, guaifenesin, mometasone will continue   - bronchial cultures with staph aureus sensitive to bactrim/ Unasyn consider switching?   - intubated on 1/22 for airway support, currently off sedatives attempt to extubate   - on solumedrol 40mg since 1/22- weaning to 30mg on 1/26 for 2 days and subsequently to 20mg x2days, 10mg x2 days   - POCUS with resolution of b/l pleural effusions, still holding brilinta     =====Cardiovascular=====  Patient does not currently require vasopressors and is normotensive  #HTN  - on home amlodipine - will hold 1/25 given soft BP  - restart home metoprolol as tolerated     #CAD  - on Brilinta aspirin and ranolazine continue   - as per vascular okay to hold Brilinta for possible pleural effusion thoracentesis  - awaiting call back from cardiology regarding Brilinta / last stent 2022     =====GI=====  #upper GI bleed  - s/p EGD showing dieulafoy lesion and esophagitis likely 2/2 NGT irritation s/p 2 clips  - on protonix IV BID continue     #Diet  - tube feeds NGT   - trickle feeds started 1/25    =====Renal/=====  #Electrolytes  - Maintain K>4, Phos>3, Mag>2, iCal>1  - baseline cr 1.5, at baseline      =====Endocrine=====  #DM2  - on NPH 16 units q6 and SSI given solumedrol   - a1c 9.3, glucose wnl inpatient     =====Infectious Disease=====  #COVID   - s/p paxlovid and 1 dose remdesivir  # PNA  - CT chest showing ground glass opacities  - treatment with azithro and cefepime currently- switch to zosyn on 1/20 for aspiration coverage, dc azithro   - f/u urine legionella  - no white count or fever thus far  - f/u bronchial cultures - staph aureus   - f/u blood cultures given fever overnight on 1/20- NGTD   - ID consult 1/24- pending CT maxillofacial CT head/ Chest/ abd w contrast, repeat blood cultures    =====Heme/Onc=====  #DVT Ppx  - heparin sub-q    =====Ethics=====  FULL CODE.

## 2024-01-26 LAB
ALBUMIN SERPL ELPH-MCNC: 2.4 G/DL — LOW (ref 3.3–5)
ALP SERPL-CCNC: 75 U/L — SIGNIFICANT CHANGE UP (ref 40–120)
ALT FLD-CCNC: 12 U/L — SIGNIFICANT CHANGE UP (ref 4–41)
ANION GAP SERPL CALC-SCNC: 10 MMOL/L — SIGNIFICANT CHANGE UP (ref 7–14)
AST SERPL-CCNC: 17 U/L — SIGNIFICANT CHANGE UP (ref 4–40)
BASE EXCESS BLDV CALC-SCNC: 7.6 MMOL/L — HIGH (ref -2–3)
BILIRUB SERPL-MCNC: 0.3 MG/DL — SIGNIFICANT CHANGE UP (ref 0.2–1.2)
BLD GP AB SCN SERPL QL: NEGATIVE — SIGNIFICANT CHANGE UP
BUN SERPL-MCNC: 42 MG/DL — HIGH (ref 7–23)
CA-I SERPL-SCNC: 1.09 MMOL/L — LOW (ref 1.15–1.33)
CALCIUM SERPL-MCNC: 7.6 MG/DL — LOW (ref 8.4–10.5)
CHLORIDE BLDV-SCNC: 107 MMOL/L — SIGNIFICANT CHANGE UP (ref 96–108)
CHLORIDE SERPL-SCNC: 105 MMOL/L — SIGNIFICANT CHANGE UP (ref 98–107)
CO2 BLDV-SCNC: 34.1 MMOL/L — HIGH (ref 22–26)
CO2 SERPL-SCNC: 31 MMOL/L — SIGNIFICANT CHANGE UP (ref 22–31)
CREAT SERPL-MCNC: 1.59 MG/DL — HIGH (ref 0.5–1.3)
EGFR: 41 ML/MIN/1.73M2 — LOW
GAS PNL BLDV: 144 MMOL/L — SIGNIFICANT CHANGE UP (ref 136–145)
GAS PNL BLDV: SIGNIFICANT CHANGE UP
GLUCOSE BLDC GLUCOMTR-MCNC: 103 MG/DL — HIGH (ref 70–99)
GLUCOSE BLDC GLUCOMTR-MCNC: 124 MG/DL — HIGH (ref 70–99)
GLUCOSE BLDC GLUCOMTR-MCNC: 127 MG/DL — HIGH (ref 70–99)
GLUCOSE BLDC GLUCOMTR-MCNC: 150 MG/DL — HIGH (ref 70–99)
GLUCOSE BLDC GLUCOMTR-MCNC: 198 MG/DL — HIGH (ref 70–99)
GLUCOSE BLDC GLUCOMTR-MCNC: 233 MG/DL — HIGH (ref 70–99)
GLUCOSE BLDC GLUCOMTR-MCNC: 235 MG/DL — HIGH (ref 70–99)
GLUCOSE BLDC GLUCOMTR-MCNC: 35 MG/DL — CRITICAL LOW (ref 70–99)
GLUCOSE BLDC GLUCOMTR-MCNC: 37 MG/DL — CRITICAL LOW (ref 70–99)
GLUCOSE BLDC GLUCOMTR-MCNC: 53 MG/DL — CRITICAL LOW (ref 70–99)
GLUCOSE BLDC GLUCOMTR-MCNC: 55 MG/DL — LOW (ref 70–99)
GLUCOSE BLDV-MCNC: 126 MG/DL — HIGH (ref 70–99)
GLUCOSE SERPL-MCNC: 132 MG/DL — HIGH (ref 70–99)
HCO3 BLDV-SCNC: 33 MMOL/L — HIGH (ref 22–29)
HCT VFR BLD CALC: 23.8 % — LOW (ref 39–50)
HCT VFR BLDA CALC: 25 % — LOW (ref 39–51)
HGB BLD CALC-MCNC: 8.2 G/DL — LOW (ref 12.6–17.4)
HGB BLD-MCNC: 7.6 G/DL — LOW (ref 13–17)
INR BLD: 1.2 RATIO — HIGH (ref 0.85–1.18)
LACTATE BLDV-MCNC: 2.8 MMOL/L — HIGH (ref 0.5–2)
MAGNESIUM SERPL-MCNC: 2.1 MG/DL — SIGNIFICANT CHANGE UP (ref 1.6–2.6)
MCHC RBC-ENTMCNC: 29.6 PG — SIGNIFICANT CHANGE UP (ref 27–34)
MCHC RBC-ENTMCNC: 31.9 GM/DL — LOW (ref 32–36)
MCV RBC AUTO: 92.6 FL — SIGNIFICANT CHANGE UP (ref 80–100)
NRBC # BLD: 0 /100 WBCS — SIGNIFICANT CHANGE UP (ref 0–0)
NRBC # FLD: 0.03 K/UL — HIGH (ref 0–0)
PCO2 BLDV: 48 MMHG — SIGNIFICANT CHANGE UP (ref 42–55)
PH BLDV: 7.44 — HIGH (ref 7.32–7.43)
PHOSPHATE SERPL-MCNC: 2.2 MG/DL — LOW (ref 2.5–4.5)
PLATELET # BLD AUTO: 226 K/UL — SIGNIFICANT CHANGE UP (ref 150–400)
PO2 BLDV: 59 MMHG — HIGH (ref 25–45)
POTASSIUM BLDV-SCNC: 3.2 MMOL/L — LOW (ref 3.5–5.1)
POTASSIUM SERPL-MCNC: 3.3 MMOL/L — LOW (ref 3.5–5.3)
POTASSIUM SERPL-SCNC: 3.3 MMOL/L — LOW (ref 3.5–5.3)
PROT SERPL-MCNC: 6.1 G/DL — SIGNIFICANT CHANGE UP (ref 6–8.3)
PROTHROM AB SERPL-ACNC: 13.5 SEC — HIGH (ref 9.5–13)
RBC # BLD: 2.57 M/UL — LOW (ref 4.2–5.8)
RBC # FLD: 17.5 % — HIGH (ref 10.3–14.5)
RH IG SCN BLD-IMP: POSITIVE — SIGNIFICANT CHANGE UP
SAO2 % BLDV: 88.7 % — HIGH (ref 67–88)
SODIUM SERPL-SCNC: 146 MMOL/L — HIGH (ref 135–145)
WBC # BLD: 10.08 K/UL — SIGNIFICANT CHANGE UP (ref 3.8–10.5)
WBC # FLD AUTO: 10.08 K/UL — SIGNIFICANT CHANGE UP (ref 3.8–10.5)

## 2024-01-26 PROCEDURE — 76604 US EXAM CHEST: CPT | Mod: 26,GC

## 2024-01-26 PROCEDURE — 76705 ECHO EXAM OF ABDOMEN: CPT | Mod: 26

## 2024-01-26 PROCEDURE — 99291 CRITICAL CARE FIRST HOUR: CPT

## 2024-01-26 PROCEDURE — 47490 INCISION OF GALLBLADDER: CPT | Mod: GC

## 2024-01-26 PROCEDURE — 99232 SBSQ HOSP IP/OBS MODERATE 35: CPT

## 2024-01-26 PROCEDURE — 78226 HEPATOBILIARY SYSTEM IMAGING: CPT | Mod: 26,GC

## 2024-01-26 RX ORDER — GLUCAGON INJECTION, SOLUTION 0.5 MG/.1ML
1 INJECTION, SOLUTION SUBCUTANEOUS ONCE
Refills: 0 | Status: DISCONTINUED | OUTPATIENT
Start: 2024-01-26 | End: 2024-02-02

## 2024-01-26 RX ORDER — DEXTROSE 50 % IN WATER 50 %
15 SYRINGE (ML) INTRAVENOUS ONCE
Refills: 0 | Status: DISCONTINUED | OUTPATIENT
Start: 2024-01-26 | End: 2024-02-02

## 2024-01-26 RX ORDER — DEXTROSE 50 % IN WATER 50 %
50 SYRINGE (ML) INTRAVENOUS ONCE
Refills: 0 | Status: COMPLETED | OUTPATIENT
Start: 2024-01-26 | End: 2024-01-26

## 2024-01-26 RX ORDER — HUMAN INSULIN 100 [IU]/ML
12 INJECTION, SUSPENSION SUBCUTANEOUS EVERY 6 HOURS
Refills: 0 | Status: DISCONTINUED | OUTPATIENT
Start: 2024-01-26 | End: 2024-01-26

## 2024-01-26 RX ORDER — DEXTROSE 50 % IN WATER 50 %
25 SYRINGE (ML) INTRAVENOUS ONCE
Refills: 0 | Status: DISCONTINUED | OUTPATIENT
Start: 2024-01-26 | End: 2024-02-02

## 2024-01-26 RX ORDER — DEXTROSE 10 % IN WATER 10 %
1000 INTRAVENOUS SOLUTION INTRAVENOUS
Refills: 0 | Status: DISCONTINUED | OUTPATIENT
Start: 2024-01-26 | End: 2024-01-27

## 2024-01-26 RX ORDER — SODIUM CHLORIDE 9 MG/ML
1000 INJECTION, SOLUTION INTRAVENOUS
Refills: 0 | Status: DISCONTINUED | OUTPATIENT
Start: 2024-01-26 | End: 2024-01-26

## 2024-01-26 RX ORDER — DEXTROSE 50 % IN WATER 50 %
12.5 SYRINGE (ML) INTRAVENOUS ONCE
Refills: 0 | Status: DISCONTINUED | OUTPATIENT
Start: 2024-01-26 | End: 2024-02-02

## 2024-01-26 RX ORDER — POTASSIUM CHLORIDE 20 MEQ
10 PACKET (EA) ORAL
Refills: 0 | Status: COMPLETED | OUTPATIENT
Start: 2024-01-26 | End: 2024-01-26

## 2024-01-26 RX ADMIN — Medication 50 MILLILITER(S): at 18:13

## 2024-01-26 RX ADMIN — Medication 1 GRAM(S): at 18:12

## 2024-01-26 RX ADMIN — LIDOCAINE 1 PATCH: 4 CREAM TOPICAL at 09:00

## 2024-01-26 RX ADMIN — PANTOPRAZOLE SODIUM 40 MILLIGRAM(S): 20 TABLET, DELAYED RELEASE ORAL at 18:12

## 2024-01-26 RX ADMIN — Medication 3 MILLILITER(S): at 21:38

## 2024-01-26 RX ADMIN — Medication 3 MILLILITER(S): at 03:08

## 2024-01-26 RX ADMIN — Medication 100 MILLIEQUIVALENT(S): at 07:35

## 2024-01-26 RX ADMIN — HEPARIN SODIUM 5000 UNIT(S): 5000 INJECTION INTRAVENOUS; SUBCUTANEOUS at 05:57

## 2024-01-26 RX ADMIN — Medication 101.5 MILLIGRAM(S): at 05:50

## 2024-01-26 RX ADMIN — Medication 1 APPLICATION(S): at 11:20

## 2024-01-26 RX ADMIN — Medication 1 APPLICATION(S): at 00:30

## 2024-01-26 RX ADMIN — Medication 50 MILLILITER(S): at 00:20

## 2024-01-26 RX ADMIN — PIPERACILLIN AND TAZOBACTAM 25 GRAM(S): 4; .5 INJECTION, POWDER, LYOPHILIZED, FOR SOLUTION INTRAVENOUS at 05:51

## 2024-01-26 RX ADMIN — HEPARIN SODIUM 5000 UNIT(S): 5000 INJECTION INTRAVENOUS; SUBCUTANEOUS at 22:06

## 2024-01-26 RX ADMIN — Medication 1 DROP(S): at 18:12

## 2024-01-26 RX ADMIN — SENNA PLUS 10 MILLILITER(S): 8.6 TABLET ORAL at 22:06

## 2024-01-26 RX ADMIN — LIDOCAINE 1 PATCH: 4 CREAM TOPICAL at 19:47

## 2024-01-26 RX ADMIN — Medication 100 MILLIEQUIVALENT(S): at 06:05

## 2024-01-26 RX ADMIN — Medication 1 DROP(S): at 05:50

## 2024-01-26 RX ADMIN — Medication 1 DROP(S): at 00:25

## 2024-01-26 RX ADMIN — CHLORHEXIDINE GLUCONATE 1 APPLICATION(S): 213 SOLUTION TOPICAL at 13:31

## 2024-01-26 RX ADMIN — Medication 50 MILLILITER(S): at 06:52

## 2024-01-26 RX ADMIN — PANTOPRAZOLE SODIUM 40 MILLIGRAM(S): 20 TABLET, DELAYED RELEASE ORAL at 05:47

## 2024-01-26 RX ADMIN — SODIUM CHLORIDE 4 MILLILITER(S): 9 INJECTION INTRAMUSCULAR; INTRAVENOUS; SUBCUTANEOUS at 03:08

## 2024-01-26 RX ADMIN — PIPERACILLIN AND TAZOBACTAM 25 GRAM(S): 4; .5 INJECTION, POWDER, LYOPHILIZED, FOR SOLUTION INTRAVENOUS at 15:15

## 2024-01-26 RX ADMIN — HEPARIN SODIUM 5000 UNIT(S): 5000 INJECTION INTRAVENOUS; SUBCUTANEOUS at 15:15

## 2024-01-26 RX ADMIN — Medication 1 GRAM(S): at 05:50

## 2024-01-26 RX ADMIN — LIDOCAINE 1 PATCH: 4 CREAM TOPICAL at 05:56

## 2024-01-26 RX ADMIN — Medication 50 GRAM(S): at 05:51

## 2024-01-26 RX ADMIN — Medication 4: at 00:26

## 2024-01-26 RX ADMIN — DEXMEDETOMIDINE HYDROCHLORIDE IN 0.9% SODIUM CHLORIDE 3.97 MICROGRAM(S)/KG/HR: 4 INJECTION INTRAVENOUS at 18:13

## 2024-01-26 RX ADMIN — DEXMEDETOMIDINE HYDROCHLORIDE IN 0.9% SODIUM CHLORIDE 3.97 MICROGRAM(S)/KG/HR: 4 INJECTION INTRAVENOUS at 19:38

## 2024-01-26 RX ADMIN — CHLORHEXIDINE GLUCONATE 15 MILLILITER(S): 213 SOLUTION TOPICAL at 18:12

## 2024-01-26 RX ADMIN — Medication 1 DROP(S): at 13:31

## 2024-01-26 RX ADMIN — SODIUM CHLORIDE 4 MILLILITER(S): 9 INJECTION INTRAMUSCULAR; INTRAVENOUS; SUBCUTANEOUS at 15:29

## 2024-01-26 RX ADMIN — LEVETIRACETAM 400 MILLIGRAM(S): 250 TABLET, FILM COATED ORAL at 08:30

## 2024-01-26 RX ADMIN — Medication 63.75 MILLIMOLE(S): at 07:49

## 2024-01-26 RX ADMIN — CHLORHEXIDINE GLUCONATE 15 MILLILITER(S): 213 SOLUTION TOPICAL at 05:50

## 2024-01-26 RX ADMIN — Medication 50 MILLILITER(S): at 19:38

## 2024-01-26 RX ADMIN — DEXMEDETOMIDINE HYDROCHLORIDE IN 0.9% SODIUM CHLORIDE 3.97 MICROGRAM(S)/KG/HR: 4 INJECTION INTRAVENOUS at 07:56

## 2024-01-26 RX ADMIN — PIPERACILLIN AND TAZOBACTAM 25 GRAM(S): 4; .5 INJECTION, POWDER, LYOPHILIZED, FOR SOLUTION INTRAVENOUS at 22:06

## 2024-01-26 RX ADMIN — Medication 50 MILLILITER(S): at 07:56

## 2024-01-26 RX ADMIN — Medication 3 MILLILITER(S): at 15:29

## 2024-01-26 RX ADMIN — SODIUM CHLORIDE 4 MILLILITER(S): 9 INJECTION INTRAMUSCULAR; INTRAVENOUS; SUBCUTANEOUS at 21:38

## 2024-01-26 RX ADMIN — LEVETIRACETAM 400 MILLIGRAM(S): 250 TABLET, FILM COATED ORAL at 18:12

## 2024-01-26 NOTE — CONSULT NOTE ADULT - SUBJECTIVE AND OBJECTIVE BOX
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 s/p EGD showing dieulafoy lesion and esophagitis likely 2/2 NGT irritation s/p 2 clips. CT on 1/25 with cholelithiasis and sludge. HIDA prelim positive.    Allergies: fluoroquinolone antibiotics (Other)  Cipro (Unknown)  Tegretol (Unknown)  carbamazepine (Other)    Medications (Abx/Cardiac/Anticoagulation/Blood Products)    aspirin  chewable: 81 milliGRAM(s) Enteral Tube (01-25 @ 11:28)  heparin   Injectable: 5000 Unit(s) SubCutaneous (01-26 @ 05:57)  piperacillin/tazobactam IVPB..: 25 mL/Hr IV Intermittent (01-26 @ 05:51)  ranolazine: 500 milliGRAM(s) Oral (01-24 @ 18:12)    Data:    T(C): 35.6  HR: 90  BP: 135/80  RR: 20  SpO2: 94%    -WBC 10.08 / HgB 7.6 / Hct 23.8 / Plt 226  -Na 146 / Cl 105 / BUN 42 / Glucose 132  -K 3.3 / CO2 31 / Cr 1.59  -ALT 12 / Alk Phos 75 / T.Bili 0.3  -INR 1.20 / PTT --      Imaging: Reviewed

## 2024-01-26 NOTE — PROGRESS NOTE ADULT - ASSESSMENT
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 CTA demonstrating mesenteric fat stranding associated with ascending/transverse colon. S/p SMA angiography with stent placement with diagnostic laparoscopy 12/24, small bowel and visible colon viable, some inflammation of omentum in RUQ. Course c/b GI bleed, s/p scope on 1/2 with GI with clips placed. Hospital course further c/b worsening respiratory distress and desaturations s/p bronchoscopy 1/19/ underwent intubation on 1/22 for hypoxemia and worsening respiratory status.     Surgery recalled for concern of acute cholecystitis. Prior HIDA scan on 12/29 consistent with acute cholecystitis. Patient now intermittently febrile with rising WBC. CT scan shows gallbladder distention with stones and mild fat stranding.     Plan:  - No acute surgical intervention  - Recommend repeat HIDA scan and if positive, IR c/s for percutaneous cholecystostomy tube placement   - Continue IV abx     Plan discussed with Dr. Caba team surgery   [id number]

## 2024-01-26 NOTE — PROGRESS NOTE ADULT - ASSESSMENT
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) who presents to the hospital for COVID19 infection and chest pain  MABLE ----improving   Hypophosphatemia  Hypokalemia - improving sp repletion  Hypertensive urgency -resolved --- BP meds as ordered  Pulm - resp failure - intubated  EEG pending   hypernatremia --decrease free water access     1 Renal - crt stable,  lasix 40mg IV once  PRN , in case of distress  start free water 150 cc every 6h   potassium repleted   4 CV - BP controlled   5 Vasc - s/p SMA stent placement;   6 Sx - s/p HIDA + for acute asa, medical management, .tube feedings   7 GI - s/p EGD clipping of bleeding duodenal ulcers   8 Anemia- hgb stable   9 Pul - intubated  [facility]   10- ID follow up        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) who presents to the hospital for COVID19 infection and chest pain  MABLE ----improving   Hypophosphatemia  Hypokalemia - improving sp repletion  Hypertensive urgency -resolved --- BP meds as ordered  Pulm - resp failure - intubated  EEG pending   hypernatremia --decrease free water access     1 Renal - crt stable,  lasix 40mg IV once  PRN , in case of distress  start free water 150 cc every 6h  potassium repleted   give neutra phos 2 packet   4 CV - BP controlled   5 Vasc - s/p SMA stent placement;   6 Sx - s/p HIDA + for acute asa, medical management, .tube feedings   7 GI - s/p EGD clipping of bleeding duodenal ulcers   8 Anemia- hgb stable   9 Pul - intubated  [facility]   10- ID follow up        [facility]  Office: [phone]

## 2024-01-26 NOTE — PROGRESS NOTE ADULT - ASSESSMENT
89-yo M w/ PMH of CAD s/p CABG w/ stents, s/p PPM, DM, PVD, CVA, CKD, and myoclonic jerks, admitted on 12/23 i/s/o recent COVID. Partially treated for COVID w/ Paxlovid. Course complicated by CT C/A/P revealing findings suggestive of acute cholecystitis (12/24/23). 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 S/p exlap, mesenteric angiogram, and SMA stent (12/24). HIDA (12/29) supported the diagnosis of acute cholecystitis. Treated medically (Zosyn 12/24-31). Further c/b acute blood loss anemia 1/1, s/p pRBC. EGD on 1/2 w/ clipping. LUE hematoma noted 1/10 on venous Duplex, w/ visualization of complex, avascular fluid collection (5.3 x 2.7 x 3.7 cm), possibly hematoma. AMS noted on 1/16, w/ CT head finding suggestive of partial opacification of the L middle ear and mastoid air cells. CT chest on 1/16 revealed new small b/l pleural effusions a/w RML and RLL collapse. Patchy b/l GGO, c/f pulm edema. S/p BAL 1/19, revealing NRF w/ Staph aureus. Restarted on Zosyn 1/20. Repeat BAL 1/22 w/ no growth. Perc asa on 1/26.      Workup:  Covid on 12/23  Blood Cx (12/24, 1/20, 1/22): NGTD  Bronch Cx (119): MSSA, moderate yeast   Legionella urine Ag neg     Spiking fevers on 1/19 and again 1/23. Febrile until 1/25.  Leukocytosis increased on 1/20 8-->16-->21-->16-->17. Improved to 10 (1/26).    Antimicrobials:   Remdesivir 12/25-26  Zosyn 12/25-31, 1/20-->  Cefepime 1/19-20    #Cholecystitis  #Acute hypoxic respiratory failure  #Pleural effusion  #Fever  #Leukocytosis  #MABLE on CKD  #Abnormal CT of head  #Conjunctival hemorrhage  #Hematoma  #Toxic metabolic encephalitis  - AMS and acute hypoxic resp failure, intubated. BAL 1/19 w/ S aureus on Zosyn, now repeat BAL 1/22 w/ no growth  - Recent COVID. Superimposed infection can be within differential.  - CT max/face and CTAP w/ contrast to assess for infectious foci, especially w/ the new L conjunctival hemorrhage.  - F/u serum Fungitell, galactomannan, and BCx  - Consider LUE hematoma evacuation and culture  - Continue with Zosyn at this time  - Would taper off to d/c steroids.  - NM w/ cholecystitis. IR perc saa on 1/26. F/u culture  - Monitor fever curve and WBC. Wean off oxygen per MICU management.    Plan discussed with MICU team.  Thank you for this consult. Inpatient ID team will follow.    Cheo Kelly MD, PhD  Attending Physician  Division of Infectious Diseases  Department of Medicine    Please contact through MS Teams message.  Office: [phone] (after 5 PM or weekend) .

## 2024-01-26 NOTE — PROGRESS NOTE ADULT - SUBJECTIVE AND OBJECTIVE BOX
NEPHROLOGY     Patient seen and examined with family at bedside, intubated,      MEDICATIONS  (STANDING):  albuterol/ipratropium for Nebulization 3 milliLiter(s) Nebulizer every 6 hours  amLODIPine   Tablet 5 milliGRAM(s) Oral daily  aspirin  chewable 81 milliGRAM(s) Oral daily  chlorhexidine 0.12% Liquid 15 milliLiter(s) Oral Mucosa every 12 hours  chlorhexidine 2% Cloths 1 Application(s) Topical daily  dextrose 5%. 1000 milliLiter(s) (100 mL/Hr) IV Continuous <Continuous>  dextrose 5%. 1000 milliLiter(s) (50 mL/Hr) IV Continuous <Continuous>  dextrose 50% Injectable 12.5 Gram(s) IV Push once  dextrose 50% Injectable 25 Gram(s) IV Push once  dextrose 50% Injectable 25 Gram(s) IV Push once  escitalopram 10 milliGRAM(s) Oral daily  glucagon  Injectable 1 milliGRAM(s) IntraMuscular once  heparin   Injectable 5000 Unit(s) SubCutaneous every 8 hours  insulin lispro (ADMELOG) corrective regimen sliding scale   SubCutaneous every 6 hours  insulin NPH human recombinant 16 Unit(s) SubCutaneous every 6 hours  levETIRAcetam  IVPB 250 milliGRAM(s) IV Intermittent every 12 hours  lidocaine   4% Patch 1 Patch Transdermal every 24 hours  methylPREDNISolone sodium succinate Injectable 40 milliGRAM(s) IV Push daily  pantoprazole  Injectable 40 milliGRAM(s) IV Push every 12 hours  piperacillin/tazobactam IVPB.. 3.375 Gram(s) IV Intermittent every 8 hours  polyethylene glycol 3350 17 Gram(s) Oral daily  potassium phosphate / sodium phosphate Powder (PHOS-NaK) 1 Packet(s) Oral three times a day  ranolazine 500 milliGRAM(s) Oral two times a day  senna 2 Tablet(s) Oral at bedtime  sodium chloride 3%  Inhalation 4 milliLiter(s) Inhalation every 6 hours  sucralfate suspension 1 Gram(s) Oral two times a day    VITALS:  T(C): , Max: 38.6 (01-24-24 @ 08:00)  T(F): , Max: 101.4 (01-24-24 @ 08:00)  HR: 109 (01-24-24 @ 11:28)  BP: 140/57 (01-24-24 @ 11:00)  BP(mean): 77 (01-24-24 @ 11:00)  RR: 23 (01-24-24 @ 11:00)  SpO2: 99% (01-24-24 @ 11:28)    I and O's:    01-23 @ 07:01  -  01-24 @ 07:00  --------------------------------------------------------  IN: 1492.3 mL / OUT: 2460 mL / NET: -967.7 mL    PHYSICAL EXAM:  Constitutional: intubated  Neck: No JVD  Respiratory: diminished bs   Cardiovascular: tachy   Gastrointestinal: BS+, soft, NT/ND  Extremities: + le edema   Neurological: uto  : sindi      LABS:                        8.2    16.40 )-----------( 305      ( 24 Jan 2024 00:15 )             25.4     01-24    141  |  100  |  31<H>  ----------------------------<  398<H>  3.8   |  29  |  1.66<H>    Ca    8.2<L>      24 Jan 2024 00:15  Phos  1.7     01-24  Mg     2.00     01-24    TPro  6.5  /  Alb  2.5<L>  /  TBili  0.4  /  DBili  x   /  AST  17  /  ALT  16  /  AlkPhos  104  01-24

## 2024-01-26 NOTE — CHART NOTE - NSCHARTNOTEFT_GEN_A_CORE
: Vincenzo Delgadillo    INDICATION: Critical Illness    PROCEDURE:  [ ] LIMITED ECHO  [x ] LIMITED CHEST  [ ] LIMITED RETROPERITONEAL  [ ] LIMITED ABDOMINAL  [ ] LIMITED DVT  [ ] NEEDLE GUIDANCE VASCULAR  [ ] NEEDLE GUIDANCE THORACENTESIS  [ ] NEEDLE GUIDANCE PARACENTESIS  [ ] NEEDLE GUIDANCE PERICARDIOCENTESIS  [ ] OTHER    FINDINGS: Small left pleural effusion. Small to moderate right pleural effusion. B-liens present throughout both lungs.      INTERPRETATION: Moderate right pleural effusion and small left pleural effusion.        Images uploaded to [employer] : Vincenzo Delgadillo    INDICATION: Critical Illness, acute hypoxemic respiratory failure     PROCEDURE:  [ ] LIMITED ECHO  [x ] LIMITED CHEST  [ ] LIMITED RETROPERITONEAL  [ ] LIMITED ABDOMINAL  [ ] LIMITED DVT  [ ] NEEDLE GUIDANCE VASCULAR  [ ] NEEDLE GUIDANCE THORACENTESIS  [ ] NEEDLE GUIDANCE PARACENTESIS  [ ] NEEDLE GUIDANCE PERICARDIOCENTESIS  [ ] OTHER    FINDINGS: Small left pleural effusion. Small to moderate right pleural effusion. B-liens present throughout both lungs.      INTERPRETATION: Moderate right pleural effusion and small left pleural effusion.        Images uploaded to [employer]    Attending Attestation:  I was present during the key portions of the procedure and immediately available during the entire procedure.  Aiden Lemon MD  Attending  Pulmonary & Critical Care Medicine

## 2024-01-26 NOTE — PROGRESS NOTE ADULT - SUBJECTIVE AND OBJECTIVE BOX
B Team Surgery Progress Note    SUBJECTIVE:   No acute events overnight.   Patient seen and examined at bedside with surgical team.       OBJECTIVE:    Vital Signs Last 24 Hrs  T(C): 36.5 (25 Jan 2024 20:00), Max: 38.7 (25 Jan 2024 12:00)  T(F): 97.7 (25 Jan 2024 20:00), Max: 101.6 (25 Jan 2024 12:00)  HR: 67 (25 Jan 2024 23:00) (67 - 131)  BP: 119/50 (25 Jan 2024 23:00) (101/42 - 136/101)  BP(mean): 66 (25 Jan 2024 23:00) (57 - 107)  RR: 20 (25 Jan 2024 23:00) (17 - 30)  SpO2: 99% (25 Jan 2024 23:00) (93% - 100%)    Parameters below as of 25 Jan 2024 23:00  Patient On (Oxygen Delivery Method): ventilator    O2 Concentration (%): 100    PHYSICAL EXAM:  Constitutional:  NAD  Respiratory: Unlabored breathing  Abdomen: Soft, nondistended, NTTP. No rebound or guarding.  Extremities: WWP, VELARDE spontaneously    MEDICATIONS  (STANDING):  albuterol/ipratropium for Nebulization 3 milliLiter(s) Nebulizer every 6 hours  artificial  tears Solution 1 Drop(s) Left EYE four times a day  aspirin  chewable 81 milliGRAM(s) Enteral Tube daily  chlorhexidine 0.12% Liquid 15 milliLiter(s) Oral Mucosa every 12 hours  chlorhexidine 2% Cloths 1 Application(s) Topical daily  dexMEDEtomidine Infusion 0.2 MICROgram(s)/kG/Hr (3.97 mL/Hr) IV Continuous <Continuous>  dextrose 5% + lactated ringers. 1000 milliLiter(s) (50 mL/Hr) IV Continuous <Continuous>  dextrose 5%. 1000 milliLiter(s) (100 mL/Hr) IV Continuous <Continuous>  dextrose 5%. 1000 milliLiter(s) (50 mL/Hr) IV Continuous <Continuous>  dextrose 50% Injectable 25 Gram(s) IV Push once  dextrose 50% Injectable 25 Gram(s) IV Push once  dextrose 50% Injectable 50 milliLiter(s) IV Push once  dextrose 50% Injectable 12.5 Gram(s) IV Push once  escitalopram 10 milliGRAM(s) Oral daily  glucagon  Injectable 1 milliGRAM(s) IntraMuscular once  heparin   Injectable 5000 Unit(s) SubCutaneous every 8 hours  insulin lispro (ADMELOG) corrective regimen sliding scale   SubCutaneous every 6 hours  insulin NPH human recombinant 12 Unit(s) SubCutaneous every 6 hours  levETIRAcetam  IVPB 250 milliGRAM(s) IV Intermittent every 12 hours  lidocaine   4% Patch 1 Patch Transdermal every 24 hours  methylPREDNISolone sodium succinate IVPB 30 milliGRAM(s) IV Intermittent daily  pantoprazole  Injectable 40 milliGRAM(s) IV Push every 12 hours  petrolatum Ophthalmic Ointment 1 Application(s) Left EYE at bedtime  piperacillin/tazobactam IVPB.. 3.375 Gram(s) IV Intermittent every 8 hours  polyethylene glycol 3350 17 Gram(s) Oral daily  senna Syrup 10 milliLiter(s) Oral at bedtime  sodium chloride 3%  Inhalation 4 milliLiter(s) Inhalation every 6 hours  sucralfate suspension 1 Gram(s) Enteral Tube two times a day    MEDICATIONS  (PRN):  dextrose Oral Gel 15 Gram(s) Oral once PRN Blood Glucose LESS THAN 70 milliGRAM(s)/deciliter      LABS:                        8.8    17.70 )-----------( 332      ( 25 Jan 2024 01:25 )             26.7     01-25    143  |  103  |  36<H>  ----------------------------<  207<H>  4.1   |  28  |  1.52<H>    Ca    8.1<L>      25 Jan 2024 01:25  Phos  1.8     01-25  Mg     1.70     01-25    TPro  7.2  /  Alb  2.7<L>  /  TBili  0.4  /  DBili  x   /  AST  20  /  ALT  17  /  AlkPhos  89  01-25    PT/INR - ( 25 Jan 2024 01:25 )   PT: 12.8 sec;   INR: 1.14 ratio         PTT - ( 25 Jan 2024 01:25 )  PTT:30.4 sec  LIVER FUNCTIONS - ( 25 Jan 2024 01:25 )  Alb: 2.7 g/dL / Pro: 7.2 g/dL / ALK PHOS: 89 U/L / ALT: 17 U/L / AST: 20 U/L / GGT: x                B Team Surgery Progress Note    SUBJECTIVE:   Surgery recalled for c/f acute cholecystitis. Patient intubated on 1/22 for worsening respiratory status. Now intermittently febrile with rising WBC.       OBJECTIVE:    Vital Signs Last 24 Hrs  T(C): 36.5 (25 Jan 2024 20:00), Max: 38.7 (25 Jan 2024 12:00)  T(F): 97.7 (25 Jan 2024 20:00), Max: 101.6 (25 Jan 2024 12:00)  HR: 67 (25 Jan 2024 23:00) (67 - 131)  BP: 119/50 (25 Jan 2024 23:00) (101/42 - 136/101)  BP(mean): 66 (25 Jan 2024 23:00) (57 - 107)  RR: 20 (25 Jan 2024 23:00) (17 - 30)  SpO2: 99% (25 Jan 2024 23:00) (93% - 100%)    Parameters below as of 25 Jan 2024 23:00  Patient On (Oxygen Delivery Method): ventilator    O2 Concentration (%): 100    PHYSICAL EXAM:  Constitutional:  sedated  Respiratory: intubated  Abdomen: Soft, distended, NTTP. No rebound or guarding.  Extremities: WWP, VELARDE spontaneously    MEDICATIONS  (STANDING):  albuterol/ipratropium for Nebulization 3 milliLiter(s) Nebulizer every 6 hours  artificial  tears Solution 1 Drop(s) Left EYE four times a day  aspirin  chewable 81 milliGRAM(s) Enteral Tube daily  chlorhexidine 0.12% Liquid 15 milliLiter(s) Oral Mucosa every 12 hours  chlorhexidine 2% Cloths 1 Application(s) Topical daily  dexMEDEtomidine Infusion 0.2 MICROgram(s)/kG/Hr (3.97 mL/Hr) IV Continuous <Continuous>  dextrose 5% + lactated ringers. 1000 milliLiter(s) (50 mL/Hr) IV Continuous <Continuous>  dextrose 5%. 1000 milliLiter(s) (100 mL/Hr) IV Continuous <Continuous>  dextrose 5%. 1000 milliLiter(s) (50 mL/Hr) IV Continuous <Continuous>  dextrose 50% Injectable 25 Gram(s) IV Push once  dextrose 50% Injectable 25 Gram(s) IV Push once  dextrose 50% Injectable 50 milliLiter(s) IV Push once  dextrose 50% Injectable 12.5 Gram(s) IV Push once  escitalopram 10 milliGRAM(s) Oral daily  glucagon  Injectable 1 milliGRAM(s) IntraMuscular once  heparin   Injectable 5000 Unit(s) SubCutaneous every 8 hours  insulin lispro (ADMELOG) corrective regimen sliding scale   SubCutaneous every 6 hours  insulin NPH human recombinant 12 Unit(s) SubCutaneous every 6 hours  levETIRAcetam  IVPB 250 milliGRAM(s) IV Intermittent every 12 hours  lidocaine   4% Patch 1 Patch Transdermal every 24 hours  methylPREDNISolone sodium succinate IVPB 30 milliGRAM(s) IV Intermittent daily  pantoprazole  Injectable 40 milliGRAM(s) IV Push every 12 hours  petrolatum Ophthalmic Ointment 1 Application(s) Left EYE at bedtime  piperacillin/tazobactam IVPB.. 3.375 Gram(s) IV Intermittent every 8 hours  polyethylene glycol 3350 17 Gram(s) Oral daily  senna Syrup 10 milliLiter(s) Oral at bedtime  sodium chloride 3%  Inhalation 4 milliLiter(s) Inhalation every 6 hours  sucralfate suspension 1 Gram(s) Enteral Tube two times a day    MEDICATIONS  (PRN):  dextrose Oral Gel 15 Gram(s) Oral once PRN Blood Glucose LESS THAN 70 milliGRAM(s)/deciliter      LABS:                        8.8    17.70 )-----------( 332      ( 25 Jan 2024 01:25 )             26.7     01-25    143  |  103  |  36<H>  ----------------------------<  207<H>  4.1   |  28  |  1.52<H>    Ca    8.1<L>      25 Jan 2024 01:25  Phos  1.8     01-25  Mg     1.70     01-25    TPro  7.2  /  Alb  2.7<L>  /  TBili  0.4  /  DBili  x   /  AST  20  /  ALT  17  /  AlkPhos  89  01-25    PT/INR - ( 25 Jan 2024 01:25 )   PT: 12.8 sec;   INR: 1.14 ratio         PTT - ( 25 Jan 2024 01:25 )  PTT:30.4 sec  LIVER FUNCTIONS - ( 25 Jan 2024 01:25 )  Alb: 2.7 g/dL / Pro: 7.2 g/dL / ALK PHOS: 89 U/L / ALT: 17 U/L / AST: 20 U/L / GGT: x

## 2024-01-26 NOTE — PROVIDER CONTACT NOTE (HYPOGLYCEMIA EVENT) - NS PROVIDER CONTACT BACKGROUND-HYPO
Age: 89y    Gender: Male    POCT Blood Glucose:  138 mg/dL (01-19-24 @ 10:00)  69 mg/dL (01-19-24 @ 8:59)  66 mg/dL (01-19-24 @ 8:54)  67 mg/dL (01-19-24 @ 8:53)      eMAR:  dextrose 50% Injectable   12.5 Gram(s) IV Push (01-19-24 @ 09:21)      RN Note:  Pt had an episode of hypoglycemia. Provider notified. Hypoglycemia protocol was initiated.
Age: 89y    Gender: Male    POCT Blood Glucose:  235 mg/dL (01-26-24 @ 05:41)  37 mg/dL (01-26-24 @ 05:32)  35 mg/dL (01-26-24 @ 05:28)  233 mg/dL (01-26-24 @ 00:20)  55 mg/dL (01-26-24 @ 00:09)  53 mg/dL (01-26-24 @ 00:06)  48 mg/dL (01-25-24 @ 23:58)  183 mg/dL (01-25-24 @ 17:30)      eMAR:  insulin lispro (ADMELOG) corrective regimen sliding scale   4 Unit(s) SubCutaneous (01-26-24 @ 00:26)   2 Unit(s) SubCutaneous (01-25-24 @ 17:34)   2 Unit(s) SubCutaneous (01-25-24 @ 05:52)    insulin NPH human recombinant   8 Unit(s) SubCutaneous (01-25-24 @ 06:05)    insulin NPH human recombinant   16 Unit(s) SubCutaneous (01-25-24 @ 17:33)   16 Unit(s) SubCutaneous (01-25-24 @ 12:57)    
Age: 89y    Gender: Male    POCT Blood Glucose:  235 mg/dL (01-26-24 @ 05:41)  37 mg/dL (01-26-24 @ 05:32)  35 mg/dL (01-26-24 @ 05:28)  233 mg/dL (01-26-24 @ 00:20)  55 mg/dL (01-26-24 @ 00:09)  53 mg/dL (01-26-24 @ 00:06)  48 mg/dL (01-25-24 @ 23:58)  183 mg/dL (01-25-24 @ 17:30)      eMAR:  insulin lispro (ADMELOG) corrective regimen sliding scale   4 Unit(s) SubCutaneous (01-26-24 @ 00:26)   2 Unit(s) SubCutaneous (01-25-24 @ 17:34)   2 Unit(s) SubCutaneous (01-25-24 @ 05:52)    insulin NPH human recombinant   16 Unit(s) SubCutaneous (01-25-24 @ 17:33)   16 Unit(s) SubCutaneous (01-25-24 @ 12:57)    insulin NPH human recombinant   8 Unit(s) SubCutaneous (01-25-24 @ 06:05)

## 2024-01-26 NOTE — PROGRESS NOTE ADULT - ASSESSMENT
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     PLAN  =====Neurologic=====  #CVA  - prior CVA x3 with no residual deficits  #myoclonic jerks  - on Keppra will continue  - holding home  gabapentin and memantine in setting of somnolence  - continue lexapro  - wean precedex       =====Pulmonary=====  #COVID  - s/p paxlovid and 1 dose remdesivir while inpatient  #Pleural effusions b/l  - CT chest from 1/16 showing new small bilateral pleural effusions with associated complete collapse  of the right middle and right lower lobes and partial atelectasis of the  right upper and left lower lobes. patchy bilateral ground-glass opacities are consistent with pulmonary edema. layering secretions in the trachea, left mainstem bronchus, and right lower lobe bronchus  - currently on zosyn for aspiration PNA  - s/p bronch for increasing secretions- on Mucomyst, hypertonic saline, albuterol, guaifenesin, mometasone will continue   - bronchial cultures with staph aureus sensitive to bactrim/ Unasyn consider switching?   - intubated on 1/22 for airway support, currently off sedatives attempt to extubate   - on solumedrol 40mg since 1/22- weaning to 30mg on 1/26 for 2 days and subsequently to 20mg x2days, 10mg x2 days   - POCUS with resolution of b/l pleural effusions, still holding brilinta     =====Cardiovascular=====  Patient does not currently require vasopressors and is normotensive  #HTN  - on home amlodipine - will hold 1/25 given soft BP  - restart home metoprolol as tolerated     #CAD  - on Brilinta aspirin and ranolazine continue   - as per vascular okay to hold Brilinta for possible pleural effusion thoracentesis  - awaiting call back from cardiology regarding Brilinta / last stent 2022     =====GI=====  #upper GI bleed  - s/p EGD showing dieulafoy lesion and esophagitis likely 2/2 NGT irritation s/p 2 clips  - on protonix IV BID continue     #Diet  - tube feeds NGT   - trickle feeds started 1/25    =====Renal/=====  #Electrolytes  - Maintain K>4, Phos>3, Mag>2, iCal>1  - baseline cr 1.5, at baseline      =====Endocrine=====  #DM2  - on NPH 16 units q6 and SSI given solumedrol   - a1c 9.3, glucose wnl inpatient     =====Infectious Disease=====  #COVID   - s/p paxlovid and 1 dose remdesivir  # PNA  - CT chest showing ground glass opacities  - treatment with azithro and cefepime currently- switch to zosyn on 1/20 for aspiration coverage, dc azithro   - f/u urine legionella  - no white count or fever thus far  - f/u bronchial cultures - staph aureus   - f/u blood cultures given fever overnight on 1/20- NGTD   - ID consult 1/24- pending CT maxillofacial CT head/ Chest/ abd w contrast, repeat blood cultures    =====Heme/Onc=====  #DVT Ppx  - heparin sub-q    =====Ethics=====  FULL CODE. 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     PLAN  =====Neurologic=====  #CVA  - prior CVA x3 with no residual deficits  #myoclonic jerks  - on Keppra will continue  - holding home  gabapentin and memantine in setting of somnolence  - continue lexapro  - wean precedex       =====Pulmonary=====  #COVID  - s/p paxlovid and 1 dose remdesivir while inpatient  #Pleural effusions b/l  - CT chest from 1/16 showing new small bilateral pleural effusions with associated complete collapse  of the right middle and right lower lobes and partial atelectasis of the  right upper and left lower lobes. patchy bilateral ground-glass opacities are consistent with pulmonary edema. layering secretions in the trachea, left mainstem bronchus, and right lower lobe bronchus  - currently on zosyn for aspiration PNA  - s/p bronch for increasing secretions- on Mucomyst, hypertonic saline, albuterol, guaifenesin, mometasone will continue   - bronchial cultures with staph aureus sensitive to bactrim/ Unasyn consider switching?   - intubated on 1/22 for airway support, currently off sedatives attempt to extubate   - on solumedrol 40mg since 1/22- weaning to 30mg on 1/26 for 2 days and subsequently to 20mg x2days, 10mg x2 days   - POCUS with resolution of b/l pleural effusions, still holding brilinta     =====Cardiovascular=====  Patient does not currently require vasopressors and is normotensive  #HTN  - on home amlodipine - will hold 1/25 given soft BP  - restart home metoprolol as tolerated     #CAD  - on Brilinta aspirin and ranolazine continue   - as per vascular okay to hold Brilinta for possible pleural effusion thoracentesis  - awaiting call back from cardiology regarding Brilinta / last stent 2022     =====GI=====  #upper GI bleed  - s/p EGD showing dieulafoy lesion and esophagitis likely 2/2 NGT irritation s/p 2 clips  - on protonix IV BID continue   - CT on 1/25 with cholelithiasis and sludge pending HIDA possible IR perc choley?     #Diet  - tube feeds NGT   - trickle feeds started 1/25    =====Renal/=====  #Electrolytes  - Maintain K>4, Phos>3, Mag>2, iCal>1  - baseline cr 1.5, at baseline      =====Endocrine=====  #DM2  - on NPH 16 units q6 and SSI given solumedrol   - a1c 9.3, glucose wnl inpatient   - hypoglycemic overnight 1/26  started on d10 drips and insulin DC'd      =====Infectious Disease=====  #COVID   - s/p paxlovid and 1 dose remdesivir  # PNA  - CT chest showing ground glass opacities  - treatment with azithro and cefepime currently- switch to zosyn on 1/20 for aspiration coverage, dc azithro   - f/u urine legionella  - no white count or fever thus far  - f/u bronchial cultures - staph aureus   - f/u blood cultures given fever overnight on 1/20- NGTD   - ID consult 1/24-  CT maxillofacial CT head wnl  - CT chest with evolving GGO since 1/16 and choleliethiasis with sludge- pending HIDA scan    =====Heme/Onc=====  #DVT Ppx  - heparin sub-q    =====Ethics=====  FULL CODE. 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     PLAN  =====Neurologic=====  #CVA  - prior CVA x3 with no residual deficits  #myoclonic jerks  - on Keppra will continue  - holding home  gabapentin and memantine in setting of somnolence  - continue lexapro  - wean precedex       =====Pulmonary=====  #COVID  - s/p paxlovid and 1 dose remdesivir while inpatient  #Pleural effusions b/l  - CT chest from 1/16 showing new small bilateral pleural effusions with associated complete collapse  of the right middle and right lower lobes and partial atelectasis of the  right upper and left lower lobes. patchy bilateral ground-glass opacities are consistent with pulmonary edema. layering secretions in the trachea, left mainstem bronchus, and right lower lobe bronchus  - currently on zosyn for aspiration PNA  - s/p bronch for increasing secretions- on Mucomyst, hypertonic saline, albuterol, guaifenesin, mometasone will continue   - bronchial cultures with staph aureus sensitive to bactrim/ Unasyn consider switching?   - intubated on 1/22 for airway support, currently off sedatives attempt to extubate   - on solumedrol 40mg since 1/22- weaning to 30mg on 1/26 for 2 days and subsequently to 20mg x2days, 10mg x2 days   - POCUS with resolution of b/l pleural effusions, still holding brilinta     =====Cardiovascular=====  Patient does not currently require vasopressors and is normotensive  #HTN  - on home amlodipine - will hold 1/25 given soft BP  - restart home metoprolol as tolerated     #CAD  - on Brilinta aspirin and ranolazine continue   - as per vascular okay to hold Brilinta for possible pleural effusion thoracentesis  - awaiting call back from cardiology regarding Brilinta / last stent 2022     =====GI=====  #upper GI bleed  - s/p EGD showing dieulafoy lesion and esophagitis likely 2/2 NGT irritation s/p 2 clips  - on protonix IV BID continue   - CT on 1/25 with cholelithiasis and sludge HIDA on 1/26 confirming likely acute choley, plan for IR perc cholecystostomy 1/26     #Diet  - tube feeds NGT   - trickle feeds started 1/25    =====Renal/=====  #Electrolytes  - Maintain K>4, Phos>3, Mag>2, iCal>1  - baseline cr 1.5, at baseline      =====Endocrine=====  #DM2  - on NPH 16 units q6 and SSI given solumedrol   - a1c 9.3, glucose wnl inpatient   - hypoglycemic overnight 1/26  started on d10 drips and insulin DC'd      =====Infectious Disease=====  #COVID   - s/p paxlovid and 1 dose remdesivir  # PNA  - CT chest showing ground glass opacities  - treatment with azithro and cefepime currently- switch to zosyn on 1/20 for aspiration coverage, dc azithro   - f/u urine legionella  - no white count or fever thus far  - f/u bronchial cultures - staph aureus   - f/u blood cultures given fever overnight on 1/20- NGTD   - ID consult 1/24-  CT maxillofacial CT head wnl  - CT chest with evolving GGO since 1/16 and choleliethiasis with sludge- pending HIDA scan    =====Heme/Onc=====  #DVT Ppx  - heparin sub-q    =====Ethics=====  FULL CODE. 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     PLAN  =====Neurologic=====  #CVA  - prior CVA x3 with no residual deficits  #myoclonic jerks  - on Keppra will continue  - holding home  gabapentin and memantine in setting of somnolence  - continue lexapro  - wean precedex as tolerated       =====Pulmonary=====  #COVID  - s/p paxlovid and 1 dose remdesivir while inpatient  #Pleural effusions b/l  - CT chest from 1/16 showing new small bilateral pleural effusions/ atelectasis/ patchy bilateral ground-glass opacities are consistent with pulmonary edema.  - currently on zosyn for aspiration PNA from 1/20- 1/28   - s/p bronch for increasing secretions- on duonebs and HTS currently, intermittent IPV BID   - bronchial cultures with staph aureus, continue zosyn   - intubated on 1/22 for airway support, currently off sedatives attempt to extubate   - on solumedrol 40mg since 1/22- weaning to 30mg on 1/26 for 2 days and subsequently to 20mg x2days, 10mg x2 days   - POCUS with resolution of b/l pleural effusions, still holding brilinta until next week possible reaccumulation requiring pleural tap   - has tolerated CPAP trials    =====Cardiovascular=====  Patient does not currently require vasopressors and is normotensive  #HTN  - on home amlodipine and metoprolol currently holding     #CAD  - on Brilinta (holding) aspirin and ranolazine continue   - as per vascular okay to hold Brilinta for possible pleural effusion thoracentesis  -have not heard back from cardiology regarding Brilinta / last stent 2022 , will hold     =====GI=====  #upper GI bleed  - s/p EGD showing dieulafoy lesion and esophagitis likely 2/2 NGT irritation s/p 2 clips  - on protonix IV BID continue   - CT on 1/25 with cholelithiasis and sludge HIDA on 1/26 confirming likely acute choley, plan for IR perc cholecystostomy 1/26     #Diet  - tube feeds NGT     =====Renal/=====  #Electrolytes  - Maintain K>4, Phos>3, Mag>2, iCal>1  - baseline cr 1.5, at baseline      =====Endocrine=====  #DM2  - on NPH 16 units q6 and SSI given solumedrol   - a1c 9.3, glucose wnl inpatient   - hypoglycemic overnight 1/26  started on d10 drips and insulin DC'd      =====Infectious Disease=====  #COVID   - s/p paxlovid and 1 dose remdesivir  # PNA  - CT chest showing ground glass opacities  - continue zosyn  - intermittent episodes of fevers, likely source GI given choleycystitis? culture NGTD  - ID consult 1/24-  CT maxillofacial CT head wnl  - CT chest with evolving GGO since 1/16    =====Heme/Onc=====  #DVT Ppx  - heparin sub-q    =====Ethics=====  FULL CODE.

## 2024-01-26 NOTE — DIETITIAN NUTRITION RISK NOTIFICATION - ADDITIONAL COMMENTS/DIETITIAN RECOMMENDATIONS
Nutrition follow up completed. Please refer to chart note via documents section in EMR for further recommendations.  Last updated: 1/26/2024

## 2024-01-26 NOTE — PROGRESS NOTE ADULT - SUBJECTIVE AND OBJECTIVE BOX
PROGRESS NOTE:   Authored by Dr. Beata Wahl MD (PGY-1). Pager [facility] [phone] / KATHIA ( [id number]) or via TEAMS    Patient is a 89y old  Male who presents with a chief complaint of COVID19, Chest pain (26 Jan 2024 00:48)      SUBJECTIVE / OVERNIGHT EVENTS:  No acute events overnight.     ADDITIONAL REVIEW OF SYSTEMS:  Patient denies fevers, chills, chest pain, shortness of breath, nausea, abdominal pain, diarrhea, constipation, dysuria, leg swelling, headache, light headedness.    MEDICATIONS  (STANDING):  albuterol/ipratropium for Nebulization 3 milliLiter(s) Nebulizer every 6 hours  artificial  tears Solution 1 Drop(s) Left EYE four times a day  aspirin  chewable 81 milliGRAM(s) Enteral Tube daily  chlorhexidine 0.12% Liquid 15 milliLiter(s) Oral Mucosa every 12 hours  chlorhexidine 2% Cloths 1 Application(s) Topical daily  dexMEDEtomidine Infusion 0.2 MICROgram(s)/kG/Hr (3.97 mL/Hr) IV Continuous <Continuous>  dextrose 10%. 1000 milliLiter(s) (50 mL/Hr) IV Continuous <Continuous>  dextrose 50% Injectable 25 Gram(s) IV Push once  dextrose 50% Injectable 25 Gram(s) IV Push once  dextrose 50% Injectable 12.5 Gram(s) IV Push once  dextrose Oral Gel 15 Gram(s) Oral once  escitalopram 10 milliGRAM(s) Oral daily  glucagon  Injectable 1 milliGRAM(s) IntraMuscular once  heparin   Injectable 5000 Unit(s) SubCutaneous every 8 hours  levETIRAcetam  IVPB 250 milliGRAM(s) IV Intermittent every 12 hours  lidocaine   4% Patch 1 Patch Transdermal every 24 hours  methylPREDNISolone sodium succinate IVPB 30 milliGRAM(s) IV Intermittent daily  pantoprazole  Injectable 40 milliGRAM(s) IV Push every 12 hours  petrolatum Ophthalmic Ointment 1 Application(s) Left EYE at bedtime  piperacillin/tazobactam IVPB.. 3.375 Gram(s) IV Intermittent every 8 hours  polyethylene glycol 3350 17 Gram(s) Oral daily  potassium chloride  10 mEq/100 mL IVPB 10 milliEquivalent(s) IV Intermittent every 1 hour  senna Syrup 10 milliLiter(s) Oral at bedtime  sodium chloride 3%  Inhalation 4 milliLiter(s) Inhalation every 6 hours  sodium phosphate 15 milliMole(s)/250 mL IVPB 15 milliMole(s) IV Intermittent once  sucralfate suspension 1 Gram(s) Enteral Tube two times a day    MEDICATIONS  (PRN):      CAPILLARY BLOOD GLUCOSE      POCT Blood Glucose.: 235 mg/dL (26 Jan 2024 05:41)  POCT Blood Glucose.: 37 mg/dL (26 Jan 2024 05:32)  POCT Blood Glucose.: 35 mg/dL (26 Jan 2024 05:28)  POCT Blood Glucose.: 233 mg/dL (26 Jan 2024 00:20)  POCT Blood Glucose.: 55 mg/dL (26 Jan 2024 00:09)  POCT Blood Glucose.: 53 mg/dL (26 Jan 2024 00:06)  POCT Blood Glucose.: 48 mg/dL (25 Jan 2024 23:58)  POCT Blood Glucose.: 183 mg/dL (25 Jan 2024 17:30)  POCT Blood Glucose.: 123 mg/dL (25 Jan 2024 12:48)    I&O's Summary    25 Jan 2024 07:01  -  26 Jan 2024 07:00  --------------------------------------------------------  IN: 1232.6 mL / OUT: 1 mL / NET: 1231.6 mL        PHYSICAL EXAM:  Vital Signs Last 24 Hrs  T(C): 36.2 (26 Jan 2024 04:00), Max: 38.7 (25 Jan 2024 12:00)  T(F): 97.2 (26 Jan 2024 04:00), Max: 101.6 (25 Jan 2024 12:00)  HR: 76 (26 Jan 2024 06:26) (61 - 118)  BP: 133/52 (26 Jan 2024 06:00) (101/42 - 133/52)  BP(mean): 71 (26 Jan 2024 06:00) (57 - 82)  RR: 16 (26 Jan 2024 06:00) (0 - 28)  SpO2: 98% (26 Jan 2024 06:26) (94% - 100%)    Parameters below as of 26 Jan 2024 04:00  Patient On (Oxygen Delivery Method): BiPAP/CPAP, CPAP 5/6        CONSTITUTIONAL: NAD, well-developed  RESPIRATORY: Normal respiratory effort; lungs are clear to auscultation bilaterally  CARDIOVASCULAR: Regular rate and rhythm, normal S1 and S2, no murmur/rub/gallop; No lower extremity edema; Peripheral pulses are 2+ bilaterally  ABDOMEN: Nontender to palpation, normoactive bowel sounds, no rebound/guarding; No hepatosplenomegaly  MSK: no clubbing or cyanosis of digits; no joint swelling or tenderness to palpation  PSYCH: A+O to person, place, and time; affect appropriate    LABS:                        7.6    10.08 )-----------( 226      ( 26 Jan 2024 01:00 )             23.8     01-26    146<H>  |  105  |  42<H>  ----------------------------<  132<H>  3.3<L>   |  31  |  1.59<H>    Ca    7.6<L>      26 Jan 2024 01:00  Phos  2.2     01-26  Mg     2.10     01-26    TPro  6.1  /  Alb  2.4<L>  /  TBili  0.3  /  DBili  x   /  AST  17  /  ALT  12  /  AlkPhos  75  01-26    PT/INR - ( 26 Jan 2024 01:00 )   PT: 13.5 sec;   INR: 1.20 ratio         PTT - ( 25 Jan 2024 01:25 )  PTT:30.4 sec      Urinalysis Basic - ( 26 Jan 2024 01:00 )    Color: x / Appearance: x / SG: x / pH: x  Gluc: 132 mg/dL / Ketone: x  / Bili: x / Urobili: x   Blood: x / Protein: x / Nitrite: x   Leuk Esterase: x / RBC: x / WBC x   Sq Epi: x / Non Sq Epi: x / Bacteria: x        Culture - Blood (collected 24 Jan 2024 17:32)  Source: .Blood Blood  Preliminary Report (25 Jan 2024 19:02):    No growth at 24 hours        Tele Reviewed:    RADIOLOGY & ADDITIONAL TESTS:  Results Reviewed:   Imaging Personally Reviewed:  Electrocardiogram Personally Reviewed:     PROGRESS NOTE:   Authored by Dr. Beata Wahl MD (PGY-1). Pager [facility] [phone] / KATHIA ( [phone]) or via TEAMS    Patient is a 89y old  Male who presents with a chief complaint of COVID19, Chest pain (26 Jan 2024 00:48)      SUBJECTIVE / OVERNIGHT EVENTS:  Hypoglycemic overnight started on d10 drip. NPO for possible HIDA scan today.  Alert and opening eyes this AM, somewhat interactive.     MEDICATIONS  (STANDING):  albuterol/ipratropium for Nebulization 3 milliLiter(s) Nebulizer every 6 hours  artificial  tears Solution 1 Drop(s) Left EYE four times a day  aspirin  chewable 81 milliGRAM(s) Enteral Tube daily  chlorhexidine 0.12% Liquid 15 milliLiter(s) Oral Mucosa every 12 hours  chlorhexidine 2% Cloths 1 Application(s) Topical daily  dexMEDEtomidine Infusion 0.2 MICROgram(s)/kG/Hr (3.97 mL/Hr) IV Continuous <Continuous>  dextrose 10%. 1000 milliLiter(s) (50 mL/Hr) IV Continuous <Continuous>  dextrose 50% Injectable 25 Gram(s) IV Push once  dextrose 50% Injectable 25 Gram(s) IV Push once  dextrose 50% Injectable 12.5 Gram(s) IV Push once  dextrose Oral Gel 15 Gram(s) Oral once  escitalopram 10 milliGRAM(s) Oral daily  glucagon  Injectable 1 milliGRAM(s) IntraMuscular once  heparin   Injectable 5000 Unit(s) SubCutaneous every 8 hours  levETIRAcetam  IVPB 250 milliGRAM(s) IV Intermittent every 12 hours  lidocaine   4% Patch 1 Patch Transdermal every 24 hours  methylPREDNISolone sodium succinate IVPB 30 milliGRAM(s) IV Intermittent daily  pantoprazole  Injectable 40 milliGRAM(s) IV Push every 12 hours  petrolatum Ophthalmic Ointment 1 Application(s) Left EYE at bedtime  piperacillin/tazobactam IVPB.. 3.375 Gram(s) IV Intermittent every 8 hours  polyethylene glycol 3350 17 Gram(s) Oral daily  potassium chloride  10 mEq/100 mL IVPB 10 milliEquivalent(s) IV Intermittent every 1 hour  senna Syrup 10 milliLiter(s) Oral at bedtime  sodium chloride 3%  Inhalation 4 milliLiter(s) Inhalation every 6 hours  sodium phosphate 15 milliMole(s)/250 mL IVPB 15 milliMole(s) IV Intermittent once  sucralfate suspension 1 Gram(s) Enteral Tube two times a day    MEDICATIONS  (PRN):      CAPILLARY BLOOD GLUCOSE      POCT Blood Glucose.: 235 mg/dL (26 Jan 2024 05:41)  POCT Blood Glucose.: 37 mg/dL (26 Jan 2024 05:32)  POCT Blood Glucose.: 35 mg/dL (26 Jan 2024 05:28)  POCT Blood Glucose.: 233 mg/dL (26 Jan 2024 00:20)  POCT Blood Glucose.: 55 mg/dL (26 Jan 2024 00:09)  POCT Blood Glucose.: 53 mg/dL (26 Jan 2024 00:06)  POCT Blood Glucose.: 48 mg/dL (25 Jan 2024 23:58)  POCT Blood Glucose.: 183 mg/dL (25 Jan 2024 17:30)  POCT Blood Glucose.: 123 mg/dL (25 Jan 2024 12:48)    I&O's Summary    25 Jan 2024 07:01  -  26 Jan 2024 07:00  --------------------------------------------------------  IN: 1232.6 mL / OUT: 1 mL / NET: 1231.6 mL        PHYSICAL EXAM:  Vital Signs Last 24 Hrs  T(C): 36.2 (26 Jan 2024 04:00), Max: 38.7 (25 Jan 2024 12:00)  T(F): 97.2 (26 Jan 2024 04:00), Max: 101.6 (25 Jan 2024 12:00)  HR: 76 (26 Jan 2024 06:26) (61 - 118)  BP: 133/52 (26 Jan 2024 06:00) (101/42 - 133/52)  BP(mean): 71 (26 Jan 2024 06:00) (57 - 82)  RR: 16 (26 Jan 2024 06:00) (0 - 28)  SpO2: 98% (26 Jan 2024 06:26) (94% - 100%)    Parameters below as of 26 Jan 2024 04:00  Patient On (Oxygen Delivery Method): BiPAP/CPAP, CPAP 5/6        CONSTITUTIONAL: intubated on Precedex , alert   RESPIRATORY: Normal respiratory effort; lungs are clear to auscultation bilaterally  CARDIOVASCULAR: Regular rate and rhythm, normal S1 and S2, no murmur/rub/gallop; No lower extremity edema; Peripheral pulses are 2+ bilaterally  ABDOMEN: Nontender to palpation, normoactive bowel sounds, no rebound/guarding; No hepatosplenomegaly  MSK: no clubbing or cyanosis of digits; no joint swelling or tenderness to palpation. 2+ pitting edema b/l lower   PSYCH: n/a     LABS:                        7.6    10.08 )-----------( 226      ( 26 Jan 2024 01:00 )             23.8     01-26    146<H>  |  105  |  42<H>  ----------------------------<  132<H>  3.3<L>   |  31  |  1.59<H>    Ca    7.6<L>      26 Jan 2024 01:00  Phos  2.2     01-26  Mg     2.10     01-26    TPro  6.1  /  Alb  2.4<L>  /  TBili  0.3  /  DBili  x   /  AST  17  /  ALT  12  /  AlkPhos  75  01-26    PT/INR - ( 26 Jan 2024 01:00 )   PT: 13.5 sec;   INR: 1.20 ratio         PTT - ( 25 Jan 2024 01:25 )  PTT:30.4 sec      Urinalysis Basic - ( 26 Jan 2024 01:00 )    Color: x / Appearance: x / SG: x / pH: x  Gluc: 132 mg/dL / Ketone: x  / Bili: x / Urobili: x   Blood: x / Protein: x / Nitrite: x   Leuk Esterase: x / RBC: x / WBC x   Sq Epi: x / Non Sq Epi: x / Bacteria: x        Culture - Blood (collected 24 Jan 2024 17:32)  Source: .Blood Blood  Preliminary Report (25 Jan 2024 19:02):    No growth at 24 hours        Tele Reviewed:    RADIOLOGY & ADDITIONAL TESTS:  Results Reviewed:   Imaging Personally Reviewed:  Electrocardiogram Personally Reviewed:

## 2024-01-26 NOTE — CHART NOTE - NSCHARTNOTEFT_GEN_A_CORE
PRE-INTERVENTIONAL RADIOLOGY PROCEDURE NOTE  ***************************************************************  Sudeep Nichole, PGY1  Internal Medicine   pager:  [facility]: [phone] [facility]: [phone]  ***************************************************************  Patient Name: SHAIK CHARANJIT    Patient Age: 89y    Patient Gender: Male    Procedure: percutaneous cholecystostomy tube     Diagnosis/Indication: acute cholecystitis     Interventional Radiology Attending Physician: MD Aneesh    Ordering Attending Physician: MD Xochilt    Pertinent Medical History:    Pertinent labs:                      7.6    10.08 )-----------( 226      ( 26 Jan 2024 01:00 )             23.8       01-26    146<H>  |  105  |  42<H>  ----------------------------<  132<H>  3.3<L>   |  31  |  1.59<H>    Ca    7.6<L>      26 Jan 2024 01:00  Phos  2.2     01-26  Mg     2.10     01-26    TPro  6.1  /  Alb  2.4<L>  /  TBili  0.3  /  DBili  x   /  AST  17  /  ALT  12  /  AlkPhos  75  01-26      PT/INR - ( 26 Jan 2024 01:00 )   PT: 13.5 sec;   INR: 1.20 ratio         PTT - ( 25 Jan 2024 01:25 )  PTT:30.4 sec        Patient and Family Aware ? Yes PRE-INTERVENTIONAL RADIOLOGY PROCEDURE NOTE  ***************************************************************  Beata Wahl PGY1  Internal Medicine   ***************************************************************  Patient Name: SHAIK CHARANJIT    Patient Age: 89y    Patient Gender: Male    Procedure: percutaneous cholecystostomy tube     Diagnosis/Indication: acute cholecystitis     Interventional Radiology Attending Physician: MD Aneesh    Ordering Attending Physician: MD Xochilt    Pertinent Medical History:    Pertinent labs:                      7.6    10.08 )-----------( 226      ( 26 Jan 2024 01:00 )             23.8       01-26    146<H>  |  105  |  42<H>  ----------------------------<  132<H>  3.3<L>   |  31  |  1.59<H>    Ca    7.6<L>      26 Jan 2024 01:00  Phos  2.2     01-26  Mg     2.10     01-26    TPro  6.1  /  Alb  2.4<L>  /  TBili  0.3  /  DBili  x   /  AST  17  /  ALT  12  /  AlkPhos  75  01-26      PT/INR - ( 26 Jan 2024 01:00 )   PT: 13.5 sec;   INR: 1.20 ratio         PTT - ( 25 Jan 2024 01:25 )  PTT:30.4 sec        Patient and Family Aware ? Yes

## 2024-01-26 NOTE — PROGRESS NOTE ADULT - SUBJECTIVE AND OBJECTIVE BOX
Follow Up:    Fever    Interval History/ROS:  Afebrile x24h. Patient was seen and examined at bedside. Intubated. ROS unable to assess.    Allergies  fluoroquinolone antibiotics (Other)  Cipro (Unknown)  Tegretol (Unknown)  carbamazepine (Other)        ANTIMICROBIALS:  piperacillin/tazobactam IVPB.. 3.375 every 8 hours      OTHER MEDS:  MEDICATIONS  (STANDING):  albuterol/ipratropium for Nebulization 3 every 6 hours  aspirin  chewable 81 daily  dexMEDEtomidine Infusion 0.2 <Continuous>  dextrose 50% Injectable 25 once  dextrose 50% Injectable 25 once  dextrose 50% Injectable 12.5 once  dextrose Oral Gel 15 once  escitalopram 10 daily  glucagon  Injectable 1 once  heparin   Injectable 5000 every 8 hours  levETIRAcetam  IVPB 250 every 12 hours  methylPREDNISolone sodium succinate IVPB 30 daily  pantoprazole  Injectable 40 every 12 hours  polyethylene glycol 3350 17 daily  senna Syrup 10 at bedtime  sodium chloride 3%  Inhalation 4 every 6 hours  sucralfate suspension 1 two times a day      Vital Signs Last 24 Hrs  T(C): 37.2 (26 Jan 2024 16:00), Max: 37.2 (26 Jan 2024 16:00)  T(F): 99 (26 Jan 2024 16:00), Max: 99 (26 Jan 2024 16:00)  HR: 82 (26 Jan 2024 19:14) (61 - 101)  BP: 135/66 (26 Jan 2024 19:00) (111/47 - 159/74)  BP(mean): 83 (26 Jan 2024 19:00) (63 - 98)  RR: 22 (26 Jan 2024 19:00) (0 - 24)  SpO2: 100% (26 Jan 2024 19:14) (90% - 100%)    Parameters below as of 26 Jan 2024 19:00  Patient On (Oxygen Delivery Method): ventilator    O2 Concentration (%): 50    PHYSICAL EXAM:  Constitutional: intubated  HEAD/EYES: L eye w/ subconjunctival hemorrhage  ENT:  +ETT; +NGT  Cardiovascular:   normal S1, S2, no murmur, RRR  Respiratory:  mechanical breath sounds  GI:  soft, nondistended  Skin:  No phlebitis; LUE hematoma w/ warmth                              7.6    10.08 )-----------( 226      ( 26 Jan 2024 01:00 )             23.8       01-26    146<H>  |  105  |  42<H>  ----------------------------<  132<H>  3.3<L>   |  31  |  1.59<H>    Ca    7.6<L>      26 Jan 2024 01:00  Phos  2.2     01-26  Mg     2.10     01-26    TPro  6.1  /  Alb  2.4<L>  /  TBili  0.3  /  DBili  x   /  AST  17  /  ALT  12  /  AlkPhos  75  01-26      Urinalysis Basic - ( 26 Jan 2024 01:00 )    Color: x / Appearance: x / SG: x / pH: x  Gluc: 132 mg/dL / Ketone: x  / Bili: x / Urobili: x   Blood: x / Protein: x / Nitrite: x   Leuk Esterase: x / RBC: x / WBC x   Sq Epi: x / Non Sq Epi: x / Bacteria: x        MICROBIOLOGY:  v  .Blood Blood  01-24-24   No growth at 48 Hours  --  --      .Bronchial Bronchial  01-22-24   Normal Respiratory Aurelia present  --    Moderate polymorphonuclear leukocytes seen per low power field  No squamous epithelial cells per low power field  No organisms seen per oil power field      .Blood Blood-Peripheral  01-20-24   No growth at 5 days  --  --      .Blood Blood-Peripheral  01-20-24   No growth at 5 days  --  --      .Bronchial R MIDDLE LOBE  01-19-24   Numerous Staphylococcus aureus  Normal Respiratory Aurelia present  --  Staphylococcus aureus          Rapid RVP Result: NotDetec (01-24 @ 22:38)        RADIOLOGY:  Imaging below independently reviewed.  < from: NM Hepatobiliary Imaging (01.26.24 @ 12:00) >    ACC: [id number] EXAM:  NM HEPATOBILIARY IMG   ORDERED BY: ARGENIS PINK     PROCEDURE DATE:  01/26/2024          INTERPRETATION:  RADIOPHARMACEUTICAL: 3.0 mCi Tc-99m-mebrofenin, I.V.    CLINICAL INFORMATION: 89 year old male with sepsis, cholelithiasis, and   pericholecystic fat stranding on CT; referred to evaluate for acute   cholecystitis.    TECHNIQUE:  Dynamic images of the anterior abdomen were obtained for 2   hours following radiopharmaceutical injection followed by static images   ofthe abdomen in the anterior and right anterior oblique projections.    At the request of the referring service morphine was not administered.    COMPARISON: Hepatobiliary scan performed on 12/29/2023 was reviewed.    FINDINGS: There is prompt, homogeneous uptake of radiopharmaceutical by   the hepatocytes. Activity is first seen in the bowel at about 15 minutes.   The gallbladder is not visualized at any time during the study. There is   good clearance of activity from the liver at the end of thestudy.    IMPRESSION: Abnormal hepatobiliary scan: In the proper clinical setting   findings are consistent with acute cholecystitis.    Dr. Rivera was informed of these results by Dr. OMI Mckee via   telephone with read back at about 12:35 PM on 1/26/2024.    --- End of Report ---            LUISITO MCKEE MD; Attending Nuclear Medicine  This document has been electronically signed. Jan 26 2024  1:03PM    < end of copied text >

## 2024-01-26 NOTE — CONSULT NOTE ADULT - ASSESSMENT
Assessment: 89y recent upper GI bleed 12/23 s/p EGD showing dieulafoy lesion and esophagitis likely 2/2 NGT irritation s/p 2 clips. CT on 1/25 with cholelithiasis and sludge. HIDA prelim positive.      Plan:   -Please place order for IR Procedure, approving attending Dr. Ulises Melendrez  -Plan for percutaneous cholecystostomy tube placement 01/26/24  -NPO  -hold therpaeutic/prophylactic anticoagulants  -IR Pre-procedure note  -Discussed with primary team      Rajesh Kumar MD  PGY-II, Diagnostic Radiology Resident    -Available on [employer] TEAMS  -Emergent issues: [facility]-p.[phone]; [facility]-p.[phone] ([phone])  -Non-emergent consults: Please place a [place] order "IR Consult" with an appropriate callback number  -Scheduling questions: [facility]: [phone]; [facility]: [phone]  -Clinic/Outpatient booking: [facility]: [phone]; [facility]: [phone]

## 2024-01-26 NOTE — PROGRESS NOTE ADULT - ATTENDING COMMENTS
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) admitted to MICU for acute respiratory failure, worsening s/p bronchoscopy 1/19, worsening hypoxemia and mental status today requiring intubation (1/22)    #Acute hypoxemic respiratory failure  #Staph Aureus Pneumonia  #Dysphagia  - c/w sedation for now, will lighten as tolerated, may need EEG  - c/w full vent support for MSSA pna and aspiration pneumonia, PS trials as tolerated  - c/w tube feeds   - c/w zosyn, f/u bronch cultures  - spoke with vascular (still awaiting cardiology call back, multiple attempts made with no response); okay to stop Brilinta from vascular standpoint for possible procedure; will stop Brilinta given family concern for need for thora (cardiac stents >1 year ago); of note, I do not think bilateral thoracentesis will help prevent recurrent aspiration pneumonias, which is why he was reintubated; also of note, effusions appear much smaller on pocus again today so do not think thoracentesis is needed  - on steroid taper  - Ct showed distended gall bladder, HIDa positive, plan for perc asa today by IR

## 2024-01-26 NOTE — PRE PROCEDURE NOTE - PRE PROCEDURE EVALUATION
Vascular & Interventional Radiology Pre-Procedure Note    Procedure Name: percutaneous cholecystostomy catheter insertion    HPI: 89y Male with acute cholecystitis not a surgical candidate presents for perc asa.    Allergies: fluoroquinolone antibiotics (Other)  Cipro (Unknown)  Tegretol (Unknown)  carbamazepine (Other)      Medications:     aspirin  chewable: 81 milliGRAM(s) Enteral Tube (01-25 @ 11:28)  heparin   Injectable: 5000 Unit(s) SubCutaneous (01-26 @ 15:15)  piperacillin/tazobactam IVPB..: 25 mL/Hr IV Intermittent (01-26 @ 15:15)  ranolazine: 500 milliGRAM(s) Oral (01-24 @ 18:12)      Data:  Vital Signs Last 24 Hrs  T(C): 36.7 (26 Jan 2024 12:00), Max: 36.8 (25 Jan 2024 16:00)  T(F): 98 (26 Jan 2024 12:00), Max: 98.3 (25 Jan 2024 16:00)  HR: 85 (26 Jan 2024 15:12) (61 - 101)  BP: 121/65 (26 Jan 2024 15:00) (101/42 - 159/74)  BP(mean): 79 (26 Jan 2024 15:00) (57 - 98)  RR: 16 (26 Jan 2024 15:00) (0 - 24)  SpO2: 99% (26 Jan 2024 15:12) (90% - 100%)    Parameters below as of 26 Jan 2024 15:00  Patient On (Oxygen Delivery Method): ventilator    O2 Concentration (%): 50    LABS:                        7.6    10.08 )-----------( 226      ( 26 Jan 2024 01:00 )             23.8     01-26    146<H>  |  105  |  42<H>  ----------------------------<  132<H>  3.3<L>   |  31  |  1.59<H>    Ca    7.6<L>      26 Jan 2024 01:00  Phos  2.2     01-26  Mg     2.10     01-26    TPro  6.1  /  Alb  2.4<L>  /  TBili  0.3  /  DBili  x   /  AST  17  /  ALT  12  /  AlkPhos  75  01-26    PT/INR - ( 26 Jan 2024 01:00 )   PT: 13.5 sec;   INR: 1.20 ratio         PTT - ( 25 Jan 2024 01:25 )  PTT:30.4 sec    Plan:   -89y Male presents for percutaneous cholecystostomy catheter insertion  -Risks/Benefits/alternatives explained with the patient's HCP and witnessed informed consent obtained.

## 2024-01-26 NOTE — CHART NOTE - NSCHARTNOTEFT_GEN_A_CORE
CRITICAL CARE NUTRITION FOLLOW-UP      89 yo M w Hx of  CAD s/p CABG s/p stents (last stent 5/2022), s/p PPM, HTN, HLD, T2DM, CKD, PVD, prior CVA x3. Presented with COVID 19.    Admitted to MICU for acute hypoxic respiratory failure and worsening mental status requiring intubation (1/22).  Pt. with staph aureus PNA and recurrent aspiration PNA.   Course c/b upper GI bleed, s/p EGD showing dieulafoy lesion and esophagitis likely 2/2 NGT irritation s/p 2 clips. CT on (1/25) with cholelithiasis and sludge HIDA today, (1/26) confirming likely acute choley, plan for IR perc cholecystostomy (1/26).    Spoke with RN.    Hence, Pt. remains NPO for procedure  Pt. had received enteral nutrition with Glucerna 1.5, however has been intermittently below goal and/or TF held x 4 days (1/22-1/26). Prior to TF, Pt. NPO.  Hence, inconsistent & likely inadequate (<50%) energy intake x at least 5d.  Current enteral regimen also would not adequately meet currently estimated protein needs.    Weight overall increased since admit, perhaps somewhat due to moderate fluid accumulation/edema.    Liquid BM (1/26).  On bowel regimen.  No vomiting.  Abdomen distended, per chart.     Pt. deemed acutely severely malnourished.  If/when appropriate to resume enteral nutrition, resume Glucerna 1.5.  Change/increase prescribed goal to 45mL/hr x 24hrs & add 1 packet Liquid Protein Supplement (LPS) per day to help meet protein needs.      _________________Current Active Diet___________________  Diet, NPO after Midnight:      NPO Start Date: 25-Jan-2024,   NPO Start Time: 23:59 (01-25-24 @ 23:53) [Active]      Diet, NPO with Tube Feed:   Tube Feeding Modality: Nasogastric  Glucerna 1.5 [place] (GLUCERNA1.5RTH)  Total Volume for 24 Hours (mL): 960  Continuous  Starting Tube Feed Rate {mL per Hour}: 10  Increase Tube Feed Rate by (mL): 10     Every 6 hours  Until Goal Tube Feed Rate (mL per Hour): 40  Tube Feed Duration (in Hours): 24  Tube Feed Start Time: 12:00 (01-25-24 @ 11:45) [Active]    960mL total volume  1440 kcals  79g protein  729mL free water       Weight:                    Height:  69" /175.2cm                   Ideal Body Weight: 160lbs / 72.7kg  78.9kg (1/26)  76.2kg (1/19)  74.7kg (12/27)          _____Estimated Energy Needs (based on IBW)_____  20-25 KCals/kg = 7739-9280 KCals/d  1.4-1.6g protein/kg = 102-116g protein/d        Edema:  2+ generalized  Skin: No pressure injuries.        ________________________Pertinent Medications____________   MEDICATIONS  (STANDING):  albuterol/ipratropium for Nebulization 3 milliLiter(s) Nebulizer every 6 hours  artificial  tears Solution 1 Drop(s) Left EYE four times a day  aspirin  chewable 81 milliGRAM(s) Enteral Tube daily  chlorhexidine 0.12% Liquid 15 milliLiter(s) Oral Mucosa every 12 hours  chlorhexidine 2% Cloths 1 Application(s) Topical daily  dexMEDEtomidine Infusion 0.2 MICROgram(s)/kG/Hr (3.97 mL/Hr) IV Continuous <Continuous>  dextrose 10%. 1000 milliLiter(s) (50 mL/Hr) IV Continuous <Continuous>  dextrose 50% Injectable 25 Gram(s) IV Push once  dextrose 50% Injectable 25 Gram(s) IV Push once  dextrose 50% Injectable 12.5 Gram(s) IV Push once  dextrose Oral Gel 15 Gram(s) Oral once  escitalopram 10 milliGRAM(s) Oral daily  glucagon  Injectable 1 milliGRAM(s) IntraMuscular once  heparin   Injectable 5000 Unit(s) SubCutaneous every 8 hours  levETIRAcetam  IVPB 250 milliGRAM(s) IV Intermittent every 12 hours  lidocaine   4% Patch 1 Patch Transdermal every 24 hours  methylPREDNISolone sodium succinate IVPB 30 milliGRAM(s) IV Intermittent daily  pantoprazole  Injectable 40 milliGRAM(s) IV Push every 12 hours  petrolatum Ophthalmic Ointment 1 Application(s) Left EYE at bedtime  piperacillin/tazobactam IVPB.. 3.375 Gram(s) IV Intermittent every 8 hours  polyethylene glycol 3350 17 Gram(s) Oral daily  senna Syrup 10 milliLiter(s) Oral at bedtime  sodium chloride 3%  Inhalation 4 milliLiter(s) Inhalation every 6 hours  sucralfate suspension 1 Gram(s) Enteral Tube two times a day    MEDICATIONS  (PRN):          _________________________Pertinent Labs____________________     01-26    146<H>  |  105  |  42<H>  ----------------------------<  132<H>  3.3<L>   |  31  |  1.59<H>    Ca    7.6<L>      26 Jan 2024 01:00  Phos  2.2     01-26  Mg     2.10     01-26    TPro  6.1  /  Alb  2.4<L>  /  TBili  0.3  /  DBili  x   /  AST  17  /  ALT  12  /  AlkPhos  75  01-26                                                                   7.6    10.08 )-----------( 226      ( 26 Jan 2024 01:00 )             23.8         CAPILLARY BLOOD GLUCOSE      POCT Blood Glucose.: 127 mg/dL (26 Jan 2024 13:08)    POCT Blood Glucose.: 127 mg/dL (01-26-24 @ 13:08)  POCT Blood Glucose.: 124 mg/dL (01-26-24 @ 10:02)  POCT Blood Glucose.: 103 mg/dL (01-26-24 @ 08:47)  POCT Blood Glucose.: 235 mg/dL (01-26-24 @ 05:41)  POCT Blood Glucose.: 37 mg/dL (01-26-24 @ 05:32)  POCT Blood Glucose.: 35 mg/dL (01-26-24 @ 05:28)  POCT Blood Glucose.: 233 mg/dL (01-26-24 @ 00:20)  POCT Blood Glucose.: 55 mg/dL (01-26-24 @ 00:09)  POCT Blood Glucose.: 53 mg/dL (01-26-24 @ 00:06)  POCT Blood Glucose.: 48 mg/dL (01-25-24 @ 23:58)  POCT Blood Glucose.: 183 mg/dL (01-25-24 @ 17:30)      ________New Nutrition Dx________  Pt. meets criteria for severe malnutrition in the context of acute [critical] illness as evidenced by 2+ edema, <50% energy intake x at least 5d        PLAN/RECOMMENDATIONS:    1) Once appropriate, resume tube feeding w Glucerna 1.5 and increase to suggested prescribed goal of 45mL/hr x 24hrs + 1 packet Liquid Protein Supplement (LPS) per day    provides:  1080mL total volume  1620 kcals  89g protein (+ 15g additional protein via LPS)= 104g total protein/d  820mL free water     2) Obtain daily weights  3) Monitor GI status, electrolytes, glucose     RDN remains available and will f/u PRN.          Catherine Dumont RDN, CDN       pager [phone] or MS Teams

## 2024-01-26 NOTE — PROCEDURE NOTE - PROCEDURE FINDINGS AND DETAILS
technically successful US and fluoroscopic guided transperitoneal cholecystostomy catheter insertion.

## 2024-01-26 NOTE — PROGRESS NOTE ADULT - SUBJECTIVE AND OBJECTIVE BOX
Aashish Boyer MD  Interventional Cardiology / Advance Heart Failure and Cardiac Transplant Specialist  [place] Office : [address] [place] [address] [place] [address]  Tel:   [place] Office : [phone] [facility] [place] [address]  Tel: [phone]       Pt is lying in bed intubated  	  MEDICATIONS:  aspirin  chewable 81 milliGRAM(s) Enteral Tube daily  heparin   Injectable 5000 Unit(s) SubCutaneous every 8 hours    piperacillin/tazobactam IVPB.. 3.375 Gram(s) IV Intermittent every 8 hours    albuterol/ipratropium for Nebulization 3 milliLiter(s) Nebulizer every 6 hours  sodium chloride 3%  Inhalation 4 milliLiter(s) Inhalation every 6 hours    dexMEDEtomidine Infusion 0.2 MICROgram(s)/kG/Hr IV Continuous <Continuous>  escitalopram 10 milliGRAM(s) Oral daily  levETIRAcetam  IVPB 250 milliGRAM(s) IV Intermittent every 12 hours    pantoprazole  Injectable 40 milliGRAM(s) IV Push every 12 hours  polyethylene glycol 3350 17 Gram(s) Oral daily  senna Syrup 10 milliLiter(s) Oral at bedtime  sucralfate suspension 1 Gram(s) Enteral Tube two times a day    dextrose 50% Injectable 25 Gram(s) IV Push once  dextrose 50% Injectable 25 Gram(s) IV Push once  dextrose 50% Injectable 12.5 Gram(s) IV Push once  dextrose Oral Gel 15 Gram(s) Oral once  glucagon  Injectable 1 milliGRAM(s) IntraMuscular once  methylPREDNISolone sodium succinate IVPB 30 milliGRAM(s) IV Intermittent daily    artificial  tears Solution 1 Drop(s) Left EYE four times a day  chlorhexidine 0.12% Liquid 15 milliLiter(s) Oral Mucosa every 12 hours  chlorhexidine 2% Cloths 1 Application(s) Topical daily  dextrose 10%. 1000 milliLiter(s) IV Continuous <Continuous>  lidocaine   4% Patch 1 Patch Transdermal every 24 hours  petrolatum Ophthalmic Ointment 1 Application(s) Left EYE at bedtime      PAST MEDICAL/SURGICAL HISTORY  PAST MEDICAL & SURGICAL HISTORY:  Hyperlipemia      Hypertension      Coronary Artery Disease      Diabetes Mellitus Type II      Stented Coronary Artery  total 5 stents, last stent 5/2019      Neuropathy      Myocardial infarction      Stroke  mild left facial numbness   no other residuals verbalized      Myoclonic jerking      Stage 3 chronic kidney disease      History of Cataract Extraction      Hx of CABG      H/O coronary angiogram      S/P coronary artery stent placement  1/6/09      S/P placement of cardiac pacemaker          SOCIAL HISTORY: Substance Use (street drugs): ( x ) never used  (  ) other:    FAMILY HISTORY:  No pertinent family history in first degree relatives      PHYSICAL EXAM:  T(C): 37.2 (01-26-24 @ 16:00), Max: 37.2 (01-26-24 @ 16:00)  HR: 65 (01-26-24 @ 17:15) (61 - 101)  BP: 116/49 (01-26-24 @ 17:15) (110/40 - 159/74)  RR: 20 (01-26-24 @ 17:15) (0 - 24)  SpO2: 95% (01-26-24 @ 17:15) (90% - 100%)  Wt(kg): --  I&O's Summary    25 Jan 2024 07:01  -  26 Jan 2024 07:00  --------------------------------------------------------  IN: 1632.6 mL / OUT: 291 mL / NET: 1341.6 mL    26 Jan 2024 07:01  -  26 Jan 2024 17:58  --------------------------------------------------------  IN: 858 mL / OUT: 150 mL / NET: 708 mL          GENERAL: intubated  EYES:   PERRLA   ENMT:   Moist mucous membranes, Good dentition, No lesions  Cardiovascular: Normal S1 S2, No JVD, No murmurs, No edema  Respiratory: b/l rhonchi  Gastrointestinal:  Soft, Non-tender, + BS	  Extremities: no edema                                    7.6    10.08 )-----------( 226      ( 26 Jan 2024 01:00 )             23.8     01-26    146<H>  |  105  |  42<H>  ----------------------------<  132<H>  3.3<L>   |  31  |  1.59<H>    Ca    7.6<L>      26 Jan 2024 01:00  Phos  2.2     01-26  Mg     2.10     01-26    TPro  6.1  /  Alb  2.4<L>  /  TBili  0.3  /  DBili  x   /  AST  17  /  ALT  12  /  AlkPhos  75  01-26    proBNP:   Lipid Profile:   HgA1c:   TSH:     Consultant(s) Notes Reviewed:  [x ] YES  [ ] NO    Care Discussed with Consultants/Other Providers [ x] YES  [ ] NO    Imaging Personally Reviewed independently:  [x] YES  [ ] NO    All labs, radiologic studies, vitals, orders and medications list reviewed. Patient is seen and examined at bedside. Case discussed with medical team.

## 2024-01-27 LAB
ALBUMIN SERPL ELPH-MCNC: 2.4 G/DL — LOW (ref 3.3–5)
ALP SERPL-CCNC: 57 U/L — SIGNIFICANT CHANGE UP (ref 40–120)
ALT FLD-CCNC: 11 U/L — SIGNIFICANT CHANGE UP (ref 4–41)
ANION GAP SERPL CALC-SCNC: 11 MMOL/L — SIGNIFICANT CHANGE UP (ref 7–14)
APTT BLD: 31.8 SEC — SIGNIFICANT CHANGE UP (ref 24.5–35.6)
AST SERPL-CCNC: 14 U/L — SIGNIFICANT CHANGE UP (ref 4–40)
BASE EXCESS BLDV CALC-SCNC: 6 MMOL/L — HIGH (ref -2–3)
BILIRUB SERPL-MCNC: 0.4 MG/DL — SIGNIFICANT CHANGE UP (ref 0.2–1.2)
BUN SERPL-MCNC: 37 MG/DL — HIGH (ref 7–23)
CA-I SERPL-SCNC: 1.08 MMOL/L — LOW (ref 1.15–1.33)
CALCIUM SERPL-MCNC: 7.5 MG/DL — LOW (ref 8.4–10.5)
CHLORIDE BLDV-SCNC: 107 MMOL/L — SIGNIFICANT CHANGE UP (ref 96–108)
CHLORIDE SERPL-SCNC: 102 MMOL/L — SIGNIFICANT CHANGE UP (ref 98–107)
CO2 BLDV-SCNC: 30.8 MMOL/L — HIGH (ref 22–26)
CO2 SERPL-SCNC: 29 MMOL/L — SIGNIFICANT CHANGE UP (ref 22–31)
CREAT SERPL-MCNC: 1.57 MG/DL — HIGH (ref 0.5–1.3)
EGFR: 42 ML/MIN/1.73M2 — LOW
GAS PNL BLDV: 139 MMOL/L — SIGNIFICANT CHANGE UP (ref 136–145)
GAS PNL BLDV: SIGNIFICANT CHANGE UP
GAS PNL BLDV: SIGNIFICANT CHANGE UP
GLUCOSE BLDC GLUCOMTR-MCNC: 299 MG/DL — HIGH (ref 70–99)
GLUCOSE BLDC GLUCOMTR-MCNC: 306 MG/DL — HIGH (ref 70–99)
GLUCOSE BLDC GLUCOMTR-MCNC: 326 MG/DL — HIGH (ref 70–99)
GLUCOSE BLDC GLUCOMTR-MCNC: 335 MG/DL — HIGH (ref 70–99)
GLUCOSE BLDC GLUCOMTR-MCNC: 353 MG/DL — HIGH (ref 70–99)
GLUCOSE BLDV-MCNC: 287 MG/DL — HIGH (ref 70–99)
GLUCOSE SERPL-MCNC: 285 MG/DL — HIGH (ref 70–99)
GRAM STN FLD: SIGNIFICANT CHANGE UP
HCO3 BLDV-SCNC: 30 MMOL/L — HIGH (ref 22–29)
HCT VFR BLD CALC: 23.8 % — LOW (ref 39–50)
HCT VFR BLDA CALC: 24 % — LOW (ref 39–51)
HGB BLD CALC-MCNC: 8.1 G/DL — LOW (ref 12.6–17.4)
HGB BLD-MCNC: 7.6 G/DL — LOW (ref 13–17)
INR BLD: 1.18 RATIO — SIGNIFICANT CHANGE UP (ref 0.85–1.18)
LACTATE BLDV-MCNC: 1.5 MMOL/L — SIGNIFICANT CHANGE UP (ref 0.5–2)
MAGNESIUM SERPL-MCNC: 2.1 MG/DL — SIGNIFICANT CHANGE UP (ref 1.6–2.6)
MCHC RBC-ENTMCNC: 29.7 PG — SIGNIFICANT CHANGE UP (ref 27–34)
MCHC RBC-ENTMCNC: 31.9 GM/DL — LOW (ref 32–36)
MCV RBC AUTO: 93 FL — SIGNIFICANT CHANGE UP (ref 80–100)
NRBC # BLD: 0 /100 WBCS — SIGNIFICANT CHANGE UP (ref 0–0)
NRBC # FLD: 0.06 K/UL — HIGH (ref 0–0)
PCO2 BLDV: 38 MMHG — LOW (ref 42–55)
PH BLDV: 7.5 — HIGH (ref 7.32–7.43)
PHOSPHATE SERPL-MCNC: 3.3 MG/DL — SIGNIFICANT CHANGE UP (ref 2.5–4.5)
PLATELET # BLD AUTO: 232 K/UL — SIGNIFICANT CHANGE UP (ref 150–400)
PO2 BLDV: 108 MMHG — HIGH (ref 25–45)
POTASSIUM BLDV-SCNC: 3.6 MMOL/L — SIGNIFICANT CHANGE UP (ref 3.5–5.1)
POTASSIUM SERPL-MCNC: 3.7 MMOL/L — SIGNIFICANT CHANGE UP (ref 3.5–5.3)
POTASSIUM SERPL-SCNC: 3.7 MMOL/L — SIGNIFICANT CHANGE UP (ref 3.5–5.3)
PROT SERPL-MCNC: 6.2 G/DL — SIGNIFICANT CHANGE UP (ref 6–8.3)
PROTHROM AB SERPL-ACNC: 13.1 SEC — HIGH (ref 9.5–13)
RBC # BLD: 2.56 M/UL — LOW (ref 4.2–5.8)
RBC # FLD: 17.6 % — HIGH (ref 10.3–14.5)
SAO2 % BLDV: 98.9 % — HIGH (ref 67–88)
SODIUM SERPL-SCNC: 142 MMOL/L — SIGNIFICANT CHANGE UP (ref 135–145)
SPECIMEN SOURCE: SIGNIFICANT CHANGE UP
WBC # BLD: 9.03 K/UL — SIGNIFICANT CHANGE UP (ref 3.8–10.5)
WBC # FLD AUTO: 9.03 K/UL — SIGNIFICANT CHANGE UP (ref 3.8–10.5)

## 2024-01-27 PROCEDURE — 99232 SBSQ HOSP IP/OBS MODERATE 35: CPT | Mod: GC

## 2024-01-27 RX ORDER — INSULIN LISPRO 100/ML
VIAL (ML) SUBCUTANEOUS EVERY 6 HOURS
Refills: 0 | Status: DISCONTINUED | OUTPATIENT
Start: 2024-01-27 | End: 2024-02-02

## 2024-01-27 RX ORDER — CALCIUM GLUCONATE 100 MG/ML
2 VIAL (ML) INTRAVENOUS ONCE
Refills: 0 | Status: COMPLETED | OUTPATIENT
Start: 2024-01-27 | End: 2024-01-27

## 2024-01-27 RX ORDER — HUMAN INSULIN 100 [IU]/ML
3 INJECTION, SUSPENSION SUBCUTANEOUS EVERY 6 HOURS
Refills: 0 | Status: DISCONTINUED | OUTPATIENT
Start: 2024-01-27 | End: 2024-01-28

## 2024-01-27 RX ADMIN — PIPERACILLIN AND TAZOBACTAM 25 GRAM(S): 4; .5 INJECTION, POWDER, LYOPHILIZED, FOR SOLUTION INTRAVENOUS at 14:03

## 2024-01-27 RX ADMIN — HEPARIN SODIUM 5000 UNIT(S): 5000 INJECTION INTRAVENOUS; SUBCUTANEOUS at 22:17

## 2024-01-27 RX ADMIN — LIDOCAINE 1 PATCH: 4 CREAM TOPICAL at 06:43

## 2024-01-27 RX ADMIN — Medication 3 MILLILITER(S): at 15:05

## 2024-01-27 RX ADMIN — Medication 1 DROP(S): at 12:07

## 2024-01-27 RX ADMIN — LIDOCAINE 1 PATCH: 4 CREAM TOPICAL at 20:11

## 2024-01-27 RX ADMIN — PIPERACILLIN AND TAZOBACTAM 25 GRAM(S): 4; .5 INJECTION, POWDER, LYOPHILIZED, FOR SOLUTION INTRAVENOUS at 05:57

## 2024-01-27 RX ADMIN — PIPERACILLIN AND TAZOBACTAM 25 GRAM(S): 4; .5 INJECTION, POWDER, LYOPHILIZED, FOR SOLUTION INTRAVENOUS at 22:17

## 2024-01-27 RX ADMIN — HEPARIN SODIUM 5000 UNIT(S): 5000 INJECTION INTRAVENOUS; SUBCUTANEOUS at 05:54

## 2024-01-27 RX ADMIN — Medication 101.5 MILLIGRAM(S): at 07:12

## 2024-01-27 RX ADMIN — Medication 4: at 05:52

## 2024-01-27 RX ADMIN — DEXMEDETOMIDINE HYDROCHLORIDE IN 0.9% SODIUM CHLORIDE 3.97 MICROGRAM(S)/KG/HR: 4 INJECTION INTRAVENOUS at 20:15

## 2024-01-27 RX ADMIN — Medication 1 DROP(S): at 00:30

## 2024-01-27 RX ADMIN — SODIUM CHLORIDE 4 MILLILITER(S): 9 INJECTION INTRAMUSCULAR; INTRAVENOUS; SUBCUTANEOUS at 15:05

## 2024-01-27 RX ADMIN — HUMAN INSULIN 3 UNIT(S): 100 INJECTION, SUSPENSION SUBCUTANEOUS at 17:05

## 2024-01-27 RX ADMIN — ESCITALOPRAM OXALATE 10 MILLIGRAM(S): 10 TABLET, FILM COATED ORAL at 12:07

## 2024-01-27 RX ADMIN — Medication 1 GRAM(S): at 17:04

## 2024-01-27 RX ADMIN — Medication 4: at 23:55

## 2024-01-27 RX ADMIN — Medication 4: at 12:11

## 2024-01-27 RX ADMIN — Medication 3: at 17:05

## 2024-01-27 RX ADMIN — PANTOPRAZOLE SODIUM 40 MILLIGRAM(S): 20 TABLET, DELAYED RELEASE ORAL at 17:04

## 2024-01-27 RX ADMIN — CHLORHEXIDINE GLUCONATE 15 MILLILITER(S): 213 SOLUTION TOPICAL at 05:55

## 2024-01-27 RX ADMIN — Medication 3 MILLILITER(S): at 04:31

## 2024-01-27 RX ADMIN — LEVETIRACETAM 400 MILLIGRAM(S): 250 TABLET, FILM COATED ORAL at 17:04

## 2024-01-27 RX ADMIN — Medication 1 DROP(S): at 23:53

## 2024-01-27 RX ADMIN — PANTOPRAZOLE SODIUM 40 MILLIGRAM(S): 20 TABLET, DELAYED RELEASE ORAL at 05:57

## 2024-01-27 RX ADMIN — Medication 1 APPLICATION(S): at 22:29

## 2024-01-27 RX ADMIN — Medication 1 GRAM(S): at 05:57

## 2024-01-27 RX ADMIN — SODIUM CHLORIDE 4 MILLILITER(S): 9 INJECTION INTRAMUSCULAR; INTRAVENOUS; SUBCUTANEOUS at 10:22

## 2024-01-27 RX ADMIN — HEPARIN SODIUM 5000 UNIT(S): 5000 INJECTION INTRAVENOUS; SUBCUTANEOUS at 14:03

## 2024-01-27 RX ADMIN — DEXMEDETOMIDINE HYDROCHLORIDE IN 0.9% SODIUM CHLORIDE 3.97 MICROGRAM(S)/KG/HR: 4 INJECTION INTRAVENOUS at 07:15

## 2024-01-27 RX ADMIN — Medication 3 MILLILITER(S): at 10:22

## 2024-01-27 RX ADMIN — Medication 200 GRAM(S): at 06:31

## 2024-01-27 RX ADMIN — Medication 81 MILLIGRAM(S): at 12:07

## 2024-01-27 RX ADMIN — Medication 1 DROP(S): at 17:04

## 2024-01-27 RX ADMIN — Medication 3 MILLILITER(S): at 22:57

## 2024-01-27 RX ADMIN — HUMAN INSULIN 3 UNIT(S): 100 INJECTION, SUSPENSION SUBCUTANEOUS at 23:54

## 2024-01-27 RX ADMIN — POLYETHYLENE GLYCOL 3350 17 GRAM(S): 17 POWDER, FOR SOLUTION ORAL at 12:07

## 2024-01-27 RX ADMIN — Medication 1 DROP(S): at 05:55

## 2024-01-27 RX ADMIN — SODIUM CHLORIDE 4 MILLILITER(S): 9 INJECTION INTRAMUSCULAR; INTRAVENOUS; SUBCUTANEOUS at 22:57

## 2024-01-27 RX ADMIN — LEVETIRACETAM 400 MILLIGRAM(S): 250 TABLET, FILM COATED ORAL at 05:56

## 2024-01-27 RX ADMIN — CHLORHEXIDINE GLUCONATE 15 MILLILITER(S): 213 SOLUTION TOPICAL at 17:04

## 2024-01-27 RX ADMIN — CHLORHEXIDINE GLUCONATE 1 APPLICATION(S): 213 SOLUTION TOPICAL at 11:00

## 2024-01-27 NOTE — PROGRESS NOTE ADULT - SUBJECTIVE AND OBJECTIVE BOX
Aashish Boyer MD  Interventional Cardiology / Advance Heart Failure and Cardiac Transplant Specialist  [place] Office : [address] [place] [address] [place] [address]  Tel:   [place] Office : [address] [facility] [place] [address]  Tel: [phone]       Pt is lying in bed intubated on 100% O2  	  MEDICATIONS:  aspirin  chewable 81 milliGRAM(s) Enteral Tube daily  heparin   Injectable 5000 Unit(s) SubCutaneous every 8 hours    piperacillin/tazobactam IVPB.. 3.375 Gram(s) IV Intermittent every 8 hours    albuterol/ipratropium for Nebulization 3 milliLiter(s) Nebulizer every 6 hours  sodium chloride 3%  Inhalation 4 milliLiter(s) Inhalation every 6 hours    dexMEDEtomidine Infusion 0.2 MICROgram(s)/kG/Hr IV Continuous <Continuous>  escitalopram 10 milliGRAM(s) Oral daily  levETIRAcetam  IVPB 250 milliGRAM(s) IV Intermittent every 12 hours    pantoprazole  Injectable 40 milliGRAM(s) IV Push every 12 hours  polyethylene glycol 3350 17 Gram(s) Oral daily  senna Syrup 10 milliLiter(s) Oral at bedtime  sucralfate suspension 1 Gram(s) Enteral Tube two times a day    dextrose 50% Injectable 25 Gram(s) IV Push once  dextrose 50% Injectable 25 Gram(s) IV Push once  dextrose 50% Injectable 12.5 Gram(s) IV Push once  dextrose Oral Gel 15 Gram(s) Oral once  glucagon  Injectable 1 milliGRAM(s) IntraMuscular once  insulin lispro (ADMELOG) corrective regimen sliding scale   SubCutaneous every 6 hours  insulin NPH human recombinant 3 Unit(s) SubCutaneous every 6 hours    artificial  tears Solution 1 Drop(s) Left EYE four times a day  chlorhexidine 0.12% Liquid 15 milliLiter(s) Oral Mucosa every 12 hours  chlorhexidine 2% Cloths 1 Application(s) Topical daily  lidocaine   4% Patch 1 Patch Transdermal every 24 hours  petrolatum Ophthalmic Ointment 1 Application(s) Left EYE at bedtime      PAST MEDICAL/SURGICAL HISTORY  PAST MEDICAL & SURGICAL HISTORY:  Hyperlipemia      Hypertension      Coronary Artery Disease      Diabetes Mellitus Type II      Stented Coronary Artery  total 5 stents, last stent 5/2019      Neuropathy      Myocardial infarction      Stroke  mild left facial numbness   no other residuals verbalized      Myoclonic jerking      Stage 3 chronic kidney disease      History of Cataract Extraction      Hx of CABG      H/O coronary angiogram      S/P coronary artery stent placement  1/6/09      S/P placement of cardiac pacemaker          SOCIAL HISTORY: Substance Use (street drugs): ( x ) never used  (  ) other:    FAMILY HISTORY:  No pertinent family history in first degree relatives       PHYSICAL EXAM:  T(C): 36 (01-27-24 @ 12:00), Max: 37.1 (01-26-24 @ 20:00)  HR: 84 (01-27-24 @ 16:00) (59 - 85)  BP: 117/61 (01-27-24 @ 16:00) (109/50 - 136/59)  RR: 16 (01-27-24 @ 16:00) (0 - 30)  SpO2: 100% (01-27-24 @ 16:00) (95% - 100%)  Wt(kg): --  I&O's Summary    26 Jan 2024 07:01  -  27 Jan 2024 07:00  --------------------------------------------------------  IN: 2085.6 mL / OUT: 415 mL / NET: 1670.6 mL    27 Jan 2024 07:01  -  27 Jan 2024 16:55  --------------------------------------------------------  IN: 534.3 mL / OUT: 20 mL / NET: 514.3 mL          GENERAL: intubated  EYES:   PERRLA   ENMT:   Moist mucous membranes, Good dentition, No lesions  Cardiovascular: Normal S1 S2, No JVD, No murmurs, No edema  Respiratory: b/l rhonchi   Gastrointestinal:  Soft, Non-tender, + BS	  Extremities: no edema                                    7.6    9.03  )-----------( 232      ( 27 Jan 2024 01:05 )             23.8     01-27    142  |  102  |  37<H>  ----------------------------<  285<H>  3.7   |  29  |  1.57<H>    Ca    7.5<L>      27 Jan 2024 01:05  Phos  3.3     01-27  Mg     2.10     01-27    TPro  6.2  /  Alb  2.4<L>  /  TBili  0.4  /  DBili  x   /  AST  14  /  ALT  11  /  AlkPhos  57  01-27    proBNP:   Lipid Profile:   HgA1c:   TSH:     Consultant(s) Notes Reviewed:  [x ] YES  [ ] NO    Care Discussed with Consultants/Other Providers [ x] YES  [ ] NO    Imaging Personally Reviewed independently:  [x] YES  [ ] NO    All labs, radiologic studies, vitals, orders and medications list reviewed. Patient is seen and examined at bedside. Case discussed with medical team.

## 2024-01-27 NOTE — PROGRESS NOTE ADULT - ASSESSMENT
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     EKG SR RBBB (old per family)     1) Hypoxia   - s/p bronch   - transferred to MICU now intubation  - Euvolemic on exam , Rt pleural effusion     2) CAD s/p CABG  -p/w chest pain. EKG non ischemic , trop -sera   - echo with normal lv and moderate AS   - c/w metoprolol   - chest pains  Trop mildly elevated and trending down likley sec to kidney disease,       3) Covid +  - s/p remdesivir   - c/w supportive management   - t/t per primary team     4) Abd distension   -CTA AP obtained which showed  partially occlusive calcified and non-calcified plaque just distal to take-off of the SMA, with estimated at least 75% luminal narrowing. Limited evaluation of its distal branches. Patient taken emergently to OR on 12/24 s/p diagnostic laparoscopy and SMA stent placement with brachial cutdown  -Surgery/vascular on board , f/u recs  - HIDA scan + for acute asa, medical management as poor surgical candidate cont zosyn  - asa hold Brilliant     5) UGIB  -GI on board s/p  EGD 1/2/24 which revealed bleeding vessel (likely Dieulafoy) s/p clipping and NGT trauma s/p clipping, fundus not fully visualized 2/2 clot, minute Tushar III lesion in duodenum.   -on Protonix IV q 12

## 2024-01-27 NOTE — PROGRESS NOTE ADULT - ASSESSMENT
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     PLAN  =====Neurologic=====  #CVA  - prior CVA x3 with no residual deficits  #myoclonic jerks  - on Keppra will continue  - holding home  gabapentin and memantine in setting of somnolence  - continue lexapro  - wean precedex as tolerated       =====Pulmonary=====  #COVID  - s/p paxlovid and 1 dose remdesivir while inpatient  #Pleural effusions b/l  - CT chest from 1/16 showing new small bilateral pleural effusions/ atelectasis/ patchy bilateral ground-glass opacities are consistent with pulmonary edema.  - currently on zosyn for aspiration PNA from 1/20- 1/28   - s/p bronch for increasing secretions- on duonebs and HTS currently, intermittent IPV BID   - bronchial cultures with staph aureus, continue zosyn   - intubated on 1/22 for airway support, currently off sedatives attempt to extubate   - on solumedrol 40mg since 1/22- weaning to 30mg on 1/26 for 2 days and subsequently to 20mg x2days, 10mg x2 days -> will hold for now (1/27)   - POCUS with resolution of b/l pleural effusions, still holding brilinta until next week possible reaccumulation requiring pleural tap     =====Cardiovascular=====  Patient does not currently require vasopressors and is normotensive  #HTN  - on home amlodipine and metoprolol currently holding     #CAD  - on Brilinta (holding) aspirin and ranolazine continue   - as per vascular okay to hold Brilinta for possible pleural effusion thoracentesis  -have not heard back from cardiology regarding Brilinta / last stent 2022 , will hold     =====GI=====  #upper GI bleed  - s/p EGD showing dieulafoy lesion and esophagitis likely 2/2 NGT irritation s/p 2 clips  - on protonix IV BID continue   - CT on 1/25 with cholelithiasis and sludge HIDA on 1/26 confirming likely acute choley, plan for IR perc cholecystostomy 1/26     #Diet  - tube feeds NGT     =====Renal/=====  #Electrolytes  - Maintain K>4, Phos>3, Mag>2, iCal>1  - baseline cr 1.5, at baseline      =====Endocrine=====  #DM2  - on NPH 16 units q6 and SSI given solumedrol   - a1c 9.3, glucose wnl inpatient   - hypoglycemic overnight 1/26  started on d10 drips and insulin DC'd  - CTM w/ FSGs, will consider restarting insulin at lower dose      =====Infectious Disease=====  #COVID   - s/p paxlovid and 1 dose remdesivir  # PNA  - CT chest showing ground glass opacities  - continue zosyn  - intermittent episodes of fevers, likely source GI given choleycystitis? culture NGTD  - ID consult 1/24-  CT maxillofacial CT head wnl  - CT chest with evolving GGO since 1/16    =====Heme/Onc=====  #DVT Ppx  - heparin sub-q    =====Ethics=====  FULL CODE.

## 2024-01-27 NOTE — PROGRESS NOTE ADULT - SUBJECTIVE AND OBJECTIVE BOX
INTERVAL HPI/OVERNIGHT EVENTS:    SUBJECTIVE: Patient seen and examined at bedside.     ROS: All negative except as listed above.    VITAL SIGNS:  ICU Vital Signs Last 24 Hrs  T(C): 36.4 (27 Jan 2024 04:00), Max: 37.2 (26 Jan 2024 16:00)  T(F): 97.6 (27 Jan 2024 04:00), Max: 99 (26 Jan 2024 16:00)  HR: 63 (27 Jan 2024 07:00) (59 - 101)  BP: 136/59 (27 Jan 2024 07:00) (111/47 - 159/74)  BP(mean): 79 (27 Jan 2024 07:00) (63 - 98)  ABP: --  ABP(mean): --  RR: 16 (27 Jan 2024 07:00) (0 - 30)  SpO2: 100% (27 Jan 2024 07:00) (90% - 100%)    O2 Parameters below as of 27 Jan 2024 04:00  Patient On (Oxygen Delivery Method): ventilator    O2 Concentration (%): 50      Mode: AC/ CMV (Assist Control/ Continuous Mandatory Ventilation), RR (machine): 16, TV (machine): 400, FiO2: 100, PEEP: 6, ITime: 0.86, MAP: 12, PIP: 31  Plateau pressure:   P/F ratio:     01-26 @ 07:01  -  01-27 @ 07:00  --------------------------------------------------------  IN: 2085.6 mL / OUT: 415 mL / NET: 1670.6 mL      CAPILLARY BLOOD GLUCOSE      POCT Blood Glucose.: 335 mg/dL (27 Jan 2024 05:51)      ECG: reviewed.    PHYSICAL EXAM:    GENERAL: NAD, lying in bed comfortably  HEAD:  Atraumatic, normocephalic  EYES: EOMI, PERRLA, conjunctiva and sclera clear  NECK: Supple, trachea midline, no JVD  HEART: Regular rate and rhythm, no murmurs, rubs, or gallops  LUNGS: Unlabored respirations.  Clear to auscultation bilaterally, no crackles, wheezing, or rhonchi  ABDOMEN: Soft, nontender, nondistended, +BS  EXTREMITIES: 2+ peripheral pulses bilaterally, cap refill<2 secs. No clubbing, cyanosis, or edema  NERVOUS SYSTEM:  A&Ox3, following commands, moving all extremities, no focal deficits   SKIN: No rashes or lesions    MEDICATIONS:  MEDICATIONS  (STANDING):  albuterol/ipratropium for Nebulization 3 milliLiter(s) Nebulizer every 6 hours  artificial  tears Solution 1 Drop(s) Left EYE four times a day  aspirin  chewable 81 milliGRAM(s) Enteral Tube daily  chlorhexidine 0.12% Liquid 15 milliLiter(s) Oral Mucosa every 12 hours  chlorhexidine 2% Cloths 1 Application(s) Topical daily  dexMEDEtomidine Infusion 0.2 MICROgram(s)/kG/Hr (3.97 mL/Hr) IV Continuous <Continuous>  dextrose 50% Injectable 25 Gram(s) IV Push once  dextrose 50% Injectable 25 Gram(s) IV Push once  dextrose 50% Injectable 12.5 Gram(s) IV Push once  dextrose Oral Gel 15 Gram(s) Oral once  escitalopram 10 milliGRAM(s) Oral daily  glucagon  Injectable 1 milliGRAM(s) IntraMuscular once  heparin   Injectable 5000 Unit(s) SubCutaneous every 8 hours  insulin lispro (ADMELOG) corrective regimen sliding scale   SubCutaneous every 6 hours  levETIRAcetam  IVPB 250 milliGRAM(s) IV Intermittent every 12 hours  lidocaine   4% Patch 1 Patch Transdermal every 24 hours  pantoprazole  Injectable 40 milliGRAM(s) IV Push every 12 hours  petrolatum Ophthalmic Ointment 1 Application(s) Left EYE at bedtime  piperacillin/tazobactam IVPB.. 3.375 Gram(s) IV Intermittent every 8 hours  polyethylene glycol 3350 17 Gram(s) Oral daily  senna Syrup 10 milliLiter(s) Oral at bedtime  sodium chloride 3%  Inhalation 4 milliLiter(s) Inhalation every 6 hours  sucralfate suspension 1 Gram(s) Enteral Tube two times a day    MEDICATIONS  (PRN):      ALLERGIES:  Allergies    fluoroquinolone antibiotics (Other)  Cipro (Unknown)  Tegretol (Unknown)  carbamazepine (Other)    Intolerances        LABS:                        7.6    9.03  )-----------( 232      ( 27 Jan 2024 01:05 )             23.8     01-27    142  |  102  |  37<H>  ----------------------------<  285<H>  3.7   |  29  |  1.57<H>    Ca    7.5<L>      27 Jan 2024 01:05  Phos  3.3     01-27  Mg     2.10     01-27    TPro  6.2  /  Alb  2.4<L>  /  TBili  0.4  /  DBili  x   /  AST  14  /  ALT  11  /  AlkPhos  57  01-27    PT/INR - ( 27 Jan 2024 01:05 )   PT: 13.1 sec;   INR: 1.18 ratio         PTT - ( 27 Jan 2024 01:05 )  PTT:31.8 sec  Urinalysis Basic - ( 27 Jan 2024 01:05 )    Color: x / Appearance: x / SG: x / pH: x  Gluc: 285 mg/dL / Ketone: x  / Bili: x / Urobili: x   Blood: x / Protein: x / Nitrite: x   Leuk Esterase: x / RBC: x / WBC x   Sq Epi: x / Non Sq Epi: x / Bacteria: x      ABG:      vBG:  pH, Venous: 7.50 (01-27-24 @ 01:05)  pCO2, Venous: 38 mmHg (01-27-24 @ 01:05)  pO2, Venous: 108 mmHg (01-27-24 @ 01:05)  HCO3, Venous: 30 mmol/L (01-27-24 @ 01:05)    Micro:    Culture - Blood (collected 01-24-24 @ 17:32)  Source: .Blood Blood  Preliminary Report (01-26-24 @ 19:02):    No growth at 48 Hours    Culture - Blood (collected 01-20-24 @ 13:59)  Source: .Blood Blood-Peripheral  Final Report (01-25-24 @ 17:01):    No growth at 5 days    Culture - Blood (collected 01-20-24 @ 13:49)  Source: .Blood Blood-Peripheral  Final Report (01-25-24 @ 17:01):    No growth at 5 days          RADIOLOGY & ADDITIONAL TESTS: Reviewed.   INTERVAL HPI/OVERNIGHT EVENTS:    SUBJECTIVE: Patient seen and examined at bedside. Un able to obtain ROS iso sedation.    ROS: All negative except as listed above.    VITAL SIGNS:  ICU Vital Signs Last 24 Hrs  T(C): 36.4 (27 Jan 2024 04:00), Max: 37.2 (26 Jan 2024 16:00)  T(F): 97.6 (27 Jan 2024 04:00), Max: 99 (26 Jan 2024 16:00)  HR: 63 (27 Jan 2024 07:00) (59 - 101)  BP: 136/59 (27 Jan 2024 07:00) (111/47 - 159/74)  BP(mean): 79 (27 Jan 2024 07:00) (63 - 98)  ABP: --  ABP(mean): --  RR: 16 (27 Jan 2024 07:00) (0 - 30)  SpO2: 100% (27 Jan 2024 07:00) (90% - 100%)    O2 Parameters below as of 27 Jan 2024 04:00  Patient On (Oxygen Delivery Method): ventilator    O2 Concentration (%): 50      Mode: AC/ CMV (Assist Control/ Continuous Mandatory Ventilation), RR (machine): 16, TV (machine): 400, FiO2: 100, PEEP: 6, ITime: 0.86, MAP: 12, PIP: 31  Plateau pressure:   P/F ratio:     01-26 @ 07:01  -  01-27 @ 07:00  --------------------------------------------------------  IN: 2085.6 mL / OUT: 415 mL / NET: 1670.6 mL      CAPILLARY BLOOD GLUCOSE      POCT Blood Glucose.: 335 mg/dL (27 Jan 2024 05:51)      ECG: reviewed.    PHYSICAL EXAM:    GENERAL: NAD, lying in bed comfortably  HEAD:  Atraumatic, normocephalic  EYES: EOMI, PERRLA, conjunctiva and sclera clear  NECK: Supple, trachea midline, no JVD  HEART: Regular rate and rhythm, no murmurs, rubs, or gallops  LUNGS: Unlabored respirations.  Clear to auscultation bilaterally, no crackles, wheezing, or rhonchi  ABDOMEN: Soft, nontender, nondistended, +BS  EXTREMITIES: 2+ peripheral pulses bilaterally, cap refill<2 secs. No clubbing, cyanosis, or edema  NERVOUS SYSTEM:  Sedated, intubated  SKIN: No rashes or lesions    MEDICATIONS:  MEDICATIONS  (STANDING):  albuterol/ipratropium for Nebulization 3 milliLiter(s) Nebulizer every 6 hours  artificial  tears Solution 1 Drop(s) Left EYE four times a day  aspirin  chewable 81 milliGRAM(s) Enteral Tube daily  chlorhexidine 0.12% Liquid 15 milliLiter(s) Oral Mucosa every 12 hours  chlorhexidine 2% Cloths 1 Application(s) Topical daily  dexMEDEtomidine Infusion 0.2 MICROgram(s)/kG/Hr (3.97 mL/Hr) IV Continuous <Continuous>  dextrose 50% Injectable 25 Gram(s) IV Push once  dextrose 50% Injectable 25 Gram(s) IV Push once  dextrose 50% Injectable 12.5 Gram(s) IV Push once  dextrose Oral Gel 15 Gram(s) Oral once  escitalopram 10 milliGRAM(s) Oral daily  glucagon  Injectable 1 milliGRAM(s) IntraMuscular once  heparin   Injectable 5000 Unit(s) SubCutaneous every 8 hours  insulin lispro (ADMELOG) corrective regimen sliding scale   SubCutaneous every 6 hours  levETIRAcetam  IVPB 250 milliGRAM(s) IV Intermittent every 12 hours  lidocaine   4% Patch 1 Patch Transdermal every 24 hours  pantoprazole  Injectable 40 milliGRAM(s) IV Push every 12 hours  petrolatum Ophthalmic Ointment 1 Application(s) Left EYE at bedtime  piperacillin/tazobactam IVPB.. 3.375 Gram(s) IV Intermittent every 8 hours  polyethylene glycol 3350 17 Gram(s) Oral daily  senna Syrup 10 milliLiter(s) Oral at bedtime  sodium chloride 3%  Inhalation 4 milliLiter(s) Inhalation every 6 hours  sucralfate suspension 1 Gram(s) Enteral Tube two times a day    MEDICATIONS  (PRN):      ALLERGIES:  Allergies    fluoroquinolone antibiotics (Other)  Cipro (Unknown)  Tegretol (Unknown)  carbamazepine (Other)    Intolerances        LABS:                        7.6    9.03  )-----------( 232      ( 27 Jan 2024 01:05 )             23.8     01-27    142  |  102  |  37<H>  ----------------------------<  285<H>  3.7   |  29  |  1.57<H>    Ca    7.5<L>      27 Jan 2024 01:05  Phos  3.3     01-27  Mg     2.10     01-27    TPro  6.2  /  Alb  2.4<L>  /  TBili  0.4  /  DBili  x   /  AST  14  /  ALT  11  /  AlkPhos  57  01-27    PT/INR - ( 27 Jan 2024 01:05 )   PT: 13.1 sec;   INR: 1.18 ratio         PTT - ( 27 Jan 2024 01:05 )  PTT:31.8 sec  Urinalysis Basic - ( 27 Jan 2024 01:05 )    Color: x / Appearance: x / SG: x / pH: x  Gluc: 285 mg/dL / Ketone: x  / Bili: x / Urobili: x   Blood: x / Protein: x / Nitrite: x   Leuk Esterase: x / RBC: x / WBC x   Sq Epi: x / Non Sq Epi: x / Bacteria: x      ABG:      vBG:  pH, Venous: 7.50 (01-27-24 @ 01:05)  pCO2, Venous: 38 mmHg (01-27-24 @ 01:05)  pO2, Venous: 108 mmHg (01-27-24 @ 01:05)  HCO3, Venous: 30 mmol/L (01-27-24 @ 01:05)    Micro:    Culture - Blood (collected 01-24-24 @ 17:32)  Source: .Blood Blood  Preliminary Report (01-26-24 @ 19:02):    No growth at 48 Hours    Culture - Blood (collected 01-20-24 @ 13:59)  Source: .Blood Blood-Peripheral  Final Report (01-25-24 @ 17:01):    No growth at 5 days    Culture - Blood (collected 01-20-24 @ 13:49)  Source: .Blood Blood-Peripheral  Final Report (01-25-24 @ 17:01):    No growth at 5 days          RADIOLOGY & ADDITIONAL TESTS: Reviewed.

## 2024-01-27 NOTE — PROGRESS NOTE ADULT - SUBJECTIVE AND OBJECTIVE BOX
Patient seen and examined.   Remains intubated/sedated    REVIEW OF SYSTEMS:  UTO    MEDICATIONS  (STANDING):  albuterol/ipratropium for Nebulization 3 milliLiter(s) Nebulizer every 6 hours  artificial  tears Solution 1 Drop(s) Left EYE four times a day  aspirin  chewable 81 milliGRAM(s) Enteral Tube daily  chlorhexidine 0.12% Liquid 15 milliLiter(s) Oral Mucosa every 12 hours  chlorhexidine 2% Cloths 1 Application(s) Topical daily  dexMEDEtomidine Infusion 0.2 MICROgram(s)/kG/Hr (3.97 mL/Hr) IV Continuous <Continuous>  dextrose 50% Injectable 25 Gram(s) IV Push once  dextrose 50% Injectable 25 Gram(s) IV Push once  dextrose 50% Injectable 12.5 Gram(s) IV Push once  dextrose Oral Gel 15 Gram(s) Oral once  escitalopram 10 milliGRAM(s) Oral daily  glucagon  Injectable 1 milliGRAM(s) IntraMuscular once  heparin   Injectable 5000 Unit(s) SubCutaneous every 8 hours  insulin lispro (ADMELOG) corrective regimen sliding scale   SubCutaneous every 6 hours  levETIRAcetam  IVPB 250 milliGRAM(s) IV Intermittent every 12 hours  lidocaine   4% Patch 1 Patch Transdermal every 24 hours  pantoprazole  Injectable 40 milliGRAM(s) IV Push every 12 hours  petrolatum Ophthalmic Ointment 1 Application(s) Left EYE at bedtime  piperacillin/tazobactam IVPB.. 3.375 Gram(s) IV Intermittent every 8 hours  polyethylene glycol 3350 17 Gram(s) Oral daily  senna Syrup 10 milliLiter(s) Oral at bedtime  sodium chloride 3%  Inhalation 4 milliLiter(s) Inhalation every 6 hours  sucralfate suspension 1 Gram(s) Enteral Tube two times a day      VITAL:  T(C): , Max: 37.2 (01-26-24 @ 16:00)  T(F): , Max: 99 (01-26-24 @ 16:00)  HR: 65 (01-27-24 @ 11:13)  BP: 115/65 (01-27-24 @ 11:00)  BP(mean): 71 (01-27-24 @ 11:00)  RR: 22 (01-27-24 @ 11:00)  SpO2: 100% (01-27-24 @ 11:13)  Wt(kg): --    I and O's:    01-26 @ 07:01 - 01-27 @ 07:00  --------------------------------------------------------  IN: 2085.6 mL / OUT: 415 mL / NET: 1670.6 mL    01-27 @ 07:01 - 01-27 @ 12:17  --------------------------------------------------------  IN: 130.8 mL / OUT: 20 mL / NET: 110.8 mL          PHYSICAL EXAM:    Constitutional: on vent  HEENT:  intubated  Neck: No LAD, No JVD  Respiratory: diminished b/l  Cardiovascular: S1 and S2  Gastrointestinal: BS+, soft, NT/ND  Extremities: + l/e edema  Neurological: UTO  : + Moss  Skin: No rashes  Access: Not applicable    LABS:                        7.6    9.03  )-----------( 232      ( 27 Jan 2024 01:05 )             23.8     01-27    142  |  102  |  37<H>  ----------------------------<  285<H>  3.7   |  29  |  1.57<H>    Ca    7.5<L>      27 Jan 2024 01:05  Phos  3.3     01-27  Mg     2.10     01-27    TPro  6.2  /  Alb  2.4<L>  /  TBili  0.4  /  DBili  x   /  AST  14  /  ALT  11  /  AlkPhos  57  01-27      Urine Studies:  Urinalysis Basic - ( 27 Jan 2024 01:05 )    Color: x / Appearance: x / SG: x / pH: x  Gluc: 285 mg/dL / Ketone: x  / Bili: x / Urobili: x   Blood: x / Protein: x / Nitrite: x   Leuk Esterase: x / RBC: x / WBC x   Sq Epi: x / Non Sq Epi: x / Bacteria: x      Assessment and Plan:   · Assessment	  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  Renal fxn remains stable   Hypophosphatemia- stable  Hypokalemia - improving sp repletion  Hypertensive urgency -resolved --- BP meds as ordered; BP acceptable   Pulm - resp failure - intubated  EEG pending   hypernatremia --improved/stable    1 Renal - crt stable, no objection to lasix 40mg IV once  PRN , in case of distress  continue free water 150 cc every 6h  Trend Phos level daily   4 CV - BP controlled   5 Vasc - s/p SMA stent placement;   6 Sx - s/p HIDA + for acute asa, medical management, .tube feedings   7 GI - s/p EGD clipping of bleeding duodenal ulcers   8 Anemia-continue to trend H/H; suggest PRBC for Hgb <7.0; transfuse per primary team   9 Pul - intubated  asper ICU   10- ID follow up  11 Glycemic control as able

## 2024-01-27 NOTE — PROGRESS NOTE ADULT - ATTENDING COMMENTS
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) admitted to MICU for acute respiratory failure, worsening s/p bronchoscopy 1/19, worsening hypoxemia and mental status requiring intubation (1/22). Now s/p perc asa 1/26 for cholecystitis    #Acute hypoxemic respiratory failure  #Staph Aureus Pneumonia  #Dysphagia  - Spontaneous awakening trials as tolerated, will plan for EEG is no significant improvement  - on vent support for MSSA pna/possible aspiration pneumonia, PS trials as tolerated  - c/w tube feeds   - c/w zosyn, bronchial cx from 1/22 negative. s/p perc asa 1/26 for cholecystitis. follow up studies  - okay to stop Brilinta from vascular standpoint for possible procedure; stopped Brilinta given family concern for need for thora (cardiac stents >1 year ago); pocus from today showing small right effusion with small-moderate left effusion. simple in appearance. Low suspicion that effusions are primary cause of hypoxemia/need for ventilatory support. Discussed with family members at bedside - will continue abx and airway clearance. May require tracheostomy if no significant improvement  - steroids d/c'ed due to hyperglycemia, s/p taper  - glucose control with insulin

## 2024-01-28 LAB
1,3 BETA GLUCAN SER QL: NEGATIVE — SIGNIFICANT CHANGE UP
ALBUMIN SERPL ELPH-MCNC: 2.6 G/DL — LOW (ref 3.3–5)
ALP SERPL-CCNC: 89 U/L — SIGNIFICANT CHANGE UP (ref 40–120)
ALT FLD-CCNC: 12 U/L — SIGNIFICANT CHANGE UP (ref 4–41)
ANION GAP SERPL CALC-SCNC: 12 MMOL/L — SIGNIFICANT CHANGE UP (ref 7–14)
APTT BLD: 29.4 SEC — SIGNIFICANT CHANGE UP (ref 24.5–35.6)
AST SERPL-CCNC: 11 U/L — SIGNIFICANT CHANGE UP (ref 4–40)
BASE EXCESS BLDV CALC-SCNC: 4.9 MMOL/L — HIGH (ref -2–3)
BASOPHILS # BLD AUTO: 0.01 K/UL — SIGNIFICANT CHANGE UP (ref 0–0.2)
BASOPHILS NFR BLD AUTO: 0.1 % — SIGNIFICANT CHANGE UP (ref 0–2)
BILIRUB SERPL-MCNC: 0.3 MG/DL — SIGNIFICANT CHANGE UP (ref 0.2–1.2)
BUN SERPL-MCNC: 39 MG/DL — HIGH (ref 7–23)
CA-I SERPL-SCNC: 1.16 MMOL/L — SIGNIFICANT CHANGE UP (ref 1.15–1.33)
CALCIUM SERPL-MCNC: 8.2 MG/DL — LOW (ref 8.4–10.5)
CHLORIDE BLDV-SCNC: 106 MMOL/L — SIGNIFICANT CHANGE UP (ref 96–108)
CHLORIDE SERPL-SCNC: 104 MMOL/L — SIGNIFICANT CHANGE UP (ref 98–107)
CO2 BLDV-SCNC: 31.9 MMOL/L — HIGH (ref 22–26)
CO2 SERPL-SCNC: 26 MMOL/L — SIGNIFICANT CHANGE UP (ref 22–31)
CREAT SERPL-MCNC: 1.57 MG/DL — HIGH (ref 0.5–1.3)
EGFR: 42 ML/MIN/1.73M2 — LOW
EOSINOPHIL # BLD AUTO: 0.12 K/UL — SIGNIFICANT CHANGE UP (ref 0–0.5)
EOSINOPHIL NFR BLD AUTO: 1.1 % — SIGNIFICANT CHANGE UP (ref 0–6)
FUNGITELL: <31 PG/ML — SIGNIFICANT CHANGE UP
GAS PNL BLDV: 140 MMOL/L — SIGNIFICANT CHANGE UP (ref 136–145)
GAS PNL BLDV: SIGNIFICANT CHANGE UP
GLUCOSE BLDC GLUCOMTR-MCNC: 228 MG/DL — HIGH (ref 70–99)
GLUCOSE BLDC GLUCOMTR-MCNC: 233 MG/DL — HIGH (ref 70–99)
GLUCOSE BLDC GLUCOMTR-MCNC: 258 MG/DL — HIGH (ref 70–99)
GLUCOSE BLDC GLUCOMTR-MCNC: 325 MG/DL — HIGH (ref 70–99)
GLUCOSE BLDV-MCNC: 321 MG/DL — HIGH (ref 70–99)
GLUCOSE SERPL-MCNC: 324 MG/DL — HIGH (ref 70–99)
HCO3 BLDV-SCNC: 30 MMOL/L — HIGH (ref 22–29)
HCT VFR BLD CALC: 26 % — LOW (ref 39–50)
HCT VFR BLDA CALC: 25 % — LOW (ref 39–51)
HGB BLD CALC-MCNC: 8.4 G/DL — LOW (ref 12.6–17.4)
HGB BLD-MCNC: 8 G/DL — LOW (ref 13–17)
IANC: 9.86 K/UL — HIGH (ref 1.8–7.4)
IMM GRANULOCYTES NFR BLD AUTO: 0.5 % — SIGNIFICANT CHANGE UP (ref 0–0.9)
INR BLD: 1.1 RATIO — SIGNIFICANT CHANGE UP (ref 0.85–1.18)
LACTATE BLDV-MCNC: 1.7 MMOL/L — SIGNIFICANT CHANGE UP (ref 0.5–2)
LYMPHOCYTES # BLD AUTO: 0.72 K/UL — LOW (ref 1–3.3)
LYMPHOCYTES # BLD AUTO: 6.5 % — LOW (ref 13–44)
MAGNESIUM SERPL-MCNC: 2.3 MG/DL — SIGNIFICANT CHANGE UP (ref 1.6–2.6)
MCHC RBC-ENTMCNC: 29.1 PG — SIGNIFICANT CHANGE UP (ref 27–34)
MCHC RBC-ENTMCNC: 30.8 GM/DL — LOW (ref 32–36)
MCV RBC AUTO: 94.5 FL — SIGNIFICANT CHANGE UP (ref 80–100)
MONOCYTES # BLD AUTO: 0.34 K/UL — SIGNIFICANT CHANGE UP (ref 0–0.9)
MONOCYTES NFR BLD AUTO: 3.1 % — SIGNIFICANT CHANGE UP (ref 2–14)
NEUTROPHILS # BLD AUTO: 9.86 K/UL — HIGH (ref 1.8–7.4)
NEUTROPHILS NFR BLD AUTO: 88.7 % — HIGH (ref 43–77)
NRBC # BLD: 0 /100 WBCS — SIGNIFICANT CHANGE UP (ref 0–0)
NRBC # FLD: 0.03 K/UL — HIGH (ref 0–0)
PCO2 BLDV: 49 MMHG — SIGNIFICANT CHANGE UP (ref 42–55)
PH BLDV: 7.4 — SIGNIFICANT CHANGE UP (ref 7.32–7.43)
PHOSPHATE SERPL-MCNC: 3.5 MG/DL — SIGNIFICANT CHANGE UP (ref 2.5–4.5)
PLATELET # BLD AUTO: 279 K/UL — SIGNIFICANT CHANGE UP (ref 150–400)
PO2 BLDV: 56 MMHG — HIGH (ref 25–45)
POTASSIUM BLDV-SCNC: 3.8 MMOL/L — SIGNIFICANT CHANGE UP (ref 3.5–5.1)
POTASSIUM SERPL-MCNC: 4 MMOL/L — SIGNIFICANT CHANGE UP (ref 3.5–5.3)
POTASSIUM SERPL-SCNC: 4 MMOL/L — SIGNIFICANT CHANGE UP (ref 3.5–5.3)
PROT SERPL-MCNC: 6.5 G/DL — SIGNIFICANT CHANGE UP (ref 6–8.3)
PROTHROM AB SERPL-ACNC: 12.3 SEC — SIGNIFICANT CHANGE UP (ref 9.5–13)
RBC # BLD: 2.75 M/UL — LOW (ref 4.2–5.8)
RBC # FLD: 17.6 % — HIGH (ref 10.3–14.5)
SAO2 % BLDV: 85.5 % — SIGNIFICANT CHANGE UP (ref 67–88)
SODIUM SERPL-SCNC: 142 MMOL/L — SIGNIFICANT CHANGE UP (ref 135–145)
WBC # BLD: 11.18 K/UL — HIGH (ref 3.8–10.5)
WBC # FLD AUTO: 11.18 K/UL — HIGH (ref 3.8–10.5)

## 2024-01-28 PROCEDURE — 99291 CRITICAL CARE FIRST HOUR: CPT

## 2024-01-28 RX ORDER — MEROPENEM 1 G/30ML
1000 INJECTION INTRAVENOUS EVERY 12 HOURS
Refills: 0 | Status: DISCONTINUED | OUTPATIENT
Start: 2024-01-28 | End: 2024-02-01

## 2024-01-28 RX ORDER — ACETAMINOPHEN 500 MG
1000 TABLET ORAL ONCE
Refills: 0 | Status: COMPLETED | OUTPATIENT
Start: 2024-01-28 | End: 2024-01-28

## 2024-01-28 RX ORDER — HUMAN INSULIN 100 [IU]/ML
6 INJECTION, SUSPENSION SUBCUTANEOUS EVERY 6 HOURS
Refills: 0 | Status: DISCONTINUED | OUTPATIENT
Start: 2024-01-28 | End: 2024-01-31

## 2024-01-28 RX ORDER — FENTANYL CITRATE 50 UG/ML
100 INJECTION INTRAVENOUS ONCE
Refills: 0 | Status: DISCONTINUED | OUTPATIENT
Start: 2024-01-28 | End: 2024-01-28

## 2024-01-28 RX ORDER — CHLORHEXIDINE GLUCONATE 213 G/1000ML
15 SOLUTION TOPICAL EVERY 12 HOURS
Refills: 0 | Status: DISCONTINUED | OUTPATIENT
Start: 2024-01-28 | End: 2024-01-31

## 2024-01-28 RX ADMIN — LIDOCAINE 1 PATCH: 4 CREAM TOPICAL at 08:23

## 2024-01-28 RX ADMIN — LEVETIRACETAM 400 MILLIGRAM(S): 250 TABLET, FILM COATED ORAL at 06:08

## 2024-01-28 RX ADMIN — HEPARIN SODIUM 5000 UNIT(S): 5000 INJECTION INTRAVENOUS; SUBCUTANEOUS at 14:13

## 2024-01-28 RX ADMIN — ESCITALOPRAM OXALATE 10 MILLIGRAM(S): 10 TABLET, FILM COATED ORAL at 12:24

## 2024-01-28 RX ADMIN — SODIUM CHLORIDE 4 MILLILITER(S): 9 INJECTION INTRAMUSCULAR; INTRAVENOUS; SUBCUTANEOUS at 09:25

## 2024-01-28 RX ADMIN — SODIUM CHLORIDE 4 MILLILITER(S): 9 INJECTION INTRAMUSCULAR; INTRAVENOUS; SUBCUTANEOUS at 15:01

## 2024-01-28 RX ADMIN — Medication 1 DROP(S): at 06:14

## 2024-01-28 RX ADMIN — FENTANYL CITRATE 100 MICROGRAM(S): 50 INJECTION INTRAVENOUS at 13:57

## 2024-01-28 RX ADMIN — PIPERACILLIN AND TAZOBACTAM 25 GRAM(S): 4; .5 INJECTION, POWDER, LYOPHILIZED, FOR SOLUTION INTRAVENOUS at 05:42

## 2024-01-28 RX ADMIN — Medication 2: at 17:36

## 2024-01-28 RX ADMIN — Medication 400 MILLIGRAM(S): at 09:57

## 2024-01-28 RX ADMIN — Medication 1 DROP(S): at 17:36

## 2024-01-28 RX ADMIN — HUMAN INSULIN 6 UNIT(S): 100 INJECTION, SUSPENSION SUBCUTANEOUS at 23:04

## 2024-01-28 RX ADMIN — LIDOCAINE 1 PATCH: 4 CREAM TOPICAL at 20:25

## 2024-01-28 RX ADMIN — Medication 1 GRAM(S): at 17:34

## 2024-01-28 RX ADMIN — LEVETIRACETAM 400 MILLIGRAM(S): 250 TABLET, FILM COATED ORAL at 17:35

## 2024-01-28 RX ADMIN — PANTOPRAZOLE SODIUM 40 MILLIGRAM(S): 20 TABLET, DELAYED RELEASE ORAL at 05:41

## 2024-01-28 RX ADMIN — SENNA PLUS 10 MILLILITER(S): 8.6 TABLET ORAL at 21:15

## 2024-01-28 RX ADMIN — Medication 3 MILLILITER(S): at 09:25

## 2024-01-28 RX ADMIN — Medication 81 MILLIGRAM(S): at 12:24

## 2024-01-28 RX ADMIN — DEXMEDETOMIDINE HYDROCHLORIDE IN 0.9% SODIUM CHLORIDE 3.97 MICROGRAM(S)/KG/HR: 4 INJECTION INTRAVENOUS at 20:26

## 2024-01-28 RX ADMIN — Medication 1000 MILLIGRAM(S): at 12:00

## 2024-01-28 RX ADMIN — Medication 1 GRAM(S): at 05:41

## 2024-01-28 RX ADMIN — CHLORHEXIDINE GLUCONATE 15 MILLILITER(S): 213 SOLUTION TOPICAL at 06:15

## 2024-01-28 RX ADMIN — Medication 4: at 05:53

## 2024-01-28 RX ADMIN — DEXMEDETOMIDINE HYDROCHLORIDE IN 0.9% SODIUM CHLORIDE 3.97 MICROGRAM(S)/KG/HR: 4 INJECTION INTRAVENOUS at 05:44

## 2024-01-28 RX ADMIN — Medication 3 MILLILITER(S): at 22:40

## 2024-01-28 RX ADMIN — MEROPENEM 100 MILLIGRAM(S): 1 INJECTION INTRAVENOUS at 16:21

## 2024-01-28 RX ADMIN — FENTANYL CITRATE 100 MICROGRAM(S): 50 INJECTION INTRAVENOUS at 14:12

## 2024-01-28 RX ADMIN — DEXMEDETOMIDINE HYDROCHLORIDE IN 0.9% SODIUM CHLORIDE 3.97 MICROGRAM(S)/KG/HR: 4 INJECTION INTRAVENOUS at 07:36

## 2024-01-28 RX ADMIN — HUMAN INSULIN 3 UNIT(S): 100 INJECTION, SUSPENSION SUBCUTANEOUS at 05:50

## 2024-01-28 RX ADMIN — Medication 1 DROP(S): at 12:24

## 2024-01-28 RX ADMIN — HUMAN INSULIN 6 UNIT(S): 100 INJECTION, SUSPENSION SUBCUTANEOUS at 17:37

## 2024-01-28 RX ADMIN — LIDOCAINE 1 PATCH: 4 CREAM TOPICAL at 08:22

## 2024-01-28 RX ADMIN — Medication 3: at 12:17

## 2024-01-28 RX ADMIN — DEXMEDETOMIDINE HYDROCHLORIDE IN 0.9% SODIUM CHLORIDE 3.97 MICROGRAM(S)/KG/HR: 4 INJECTION INTRAVENOUS at 02:36

## 2024-01-28 RX ADMIN — SODIUM CHLORIDE 4 MILLILITER(S): 9 INJECTION INTRAMUSCULAR; INTRAVENOUS; SUBCUTANEOUS at 22:41

## 2024-01-28 RX ADMIN — HEPARIN SODIUM 5000 UNIT(S): 5000 INJECTION INTRAVENOUS; SUBCUTANEOUS at 05:43

## 2024-01-28 RX ADMIN — SODIUM CHLORIDE 4 MILLILITER(S): 9 INJECTION INTRAMUSCULAR; INTRAVENOUS; SUBCUTANEOUS at 04:34

## 2024-01-28 RX ADMIN — HUMAN INSULIN 6 UNIT(S): 100 INJECTION, SUSPENSION SUBCUTANEOUS at 12:17

## 2024-01-28 RX ADMIN — Medication 3 MILLILITER(S): at 04:34

## 2024-01-28 RX ADMIN — Medication 3 MILLILITER(S): at 15:01

## 2024-01-28 RX ADMIN — Medication 2: at 23:05

## 2024-01-28 RX ADMIN — CHLORHEXIDINE GLUCONATE 15 MILLILITER(S): 213 SOLUTION TOPICAL at 17:34

## 2024-01-28 RX ADMIN — HEPARIN SODIUM 5000 UNIT(S): 5000 INJECTION INTRAVENOUS; SUBCUTANEOUS at 21:15

## 2024-01-28 RX ADMIN — CHLORHEXIDINE GLUCONATE 1 APPLICATION(S): 213 SOLUTION TOPICAL at 12:24

## 2024-01-28 RX ADMIN — Medication 1 APPLICATION(S): at 21:15

## 2024-01-28 RX ADMIN — PANTOPRAZOLE SODIUM 40 MILLIGRAM(S): 20 TABLET, DELAYED RELEASE ORAL at 17:34

## 2024-01-28 NOTE — PROGRESS NOTE ADULT - SUBJECTIVE AND OBJECTIVE BOX
INTERVAL HPI/OVERNIGHT EVENTS:    SUBJECTIVE: Patient seen and examined at bedside.       VITAL SIGNS:  ICU Vital Signs Last 24 Hrs  T(C): 37.2 (28 Jan 2024 04:00), Max: 37.2 (28 Jan 2024 04:00)  T(F): 98.9 (28 Jan 2024 04:00), Max: 98.9 (28 Jan 2024 04:00)  HR: 90 (28 Jan 2024 06:33) (60 - 94)  BP: 115/48 (28 Jan 2024 06:30) (103/52 - 142/58)  BP(mean): 64 (28 Jan 2024 06:30) (64 - 83)  ABP: --  ABP(mean): --  RR: 26 (28 Jan 2024 06:30) (15 - 26)  SpO2: 99% (28 Jan 2024 06:33) (97% - 100%)    O2 Parameters below as of 28 Jan 2024 06:30  Patient On (Oxygen Delivery Method): ventilator    O2 Concentration (%): 60      Mode: AC/ CMV (Assist Control/ Continuous Mandatory Ventilation), RR (machine): 16, TV (machine): 400, FiO2: 60, PEEP: 6, MAP: 12, PIP: 24  Plateau pressure:   P/F ratio:     01-27 @ 07:01  -  01-28 @ 07:00  --------------------------------------------------------  IN: 2166.8 mL / OUT: 680 mL / NET: 1486.8 mL      CAPILLARY BLOOD GLUCOSE      POCT Blood Glucose.: 325 mg/dL (28 Jan 2024 05:48)    ECG:    PHYSICAL EXAM:     VITALS:   T(C): 37.2 (01-28-24 @ 04:00), Max: 37.2 (01-28-24 @ 04:00)  HR: 90 (01-28-24 @ 06:33) (60 - 94)  BP: 115/48 (01-28-24 @ 06:30) (103/52 - 142/58)  RR: 26 (01-28-24 @ 06:30) (15 - 26)  SpO2: 99% (01-28-24 @ 06:33) (97% - 100%)    GENERAL: NAD, lying in bed comfortably  HEAD:  Atraumatic, Normocephalic  EYES: EOMI, PERRLA, conjunctiva and sclera clear  ENT: Moist mucous membranes  NECK: Supple, No JVD  CHEST/LUNG: CTABL; No rales, rhonchi, wheezing, or rubs. Unlabored respirations  HEART: RRR. No M/R/G  ABDOMEN: Soft, nontender, non-distended, normoactive BS. No hepatomegaly  EXTREMITIES:  2+ Peripheral Pulses, brisk capillary refill. No clubbing, cyanosis, or edema  NERVOUS SYSTEM:  Alert & Oriented X3, speech clear. No deficits, CN II-XII intact. Normal sensation   MSK: FROM all 4 extremities, full and equal strength  PSYCH: Normal affect, normal speech, normal behavior  SKIN: No rashes or lesions      MEDICATIONS:  MEDICATIONS  (STANDING):  albuterol/ipratropium for Nebulization 3 milliLiter(s) Nebulizer every 6 hours  artificial  tears Solution 1 Drop(s) Left EYE four times a day  aspirin  chewable 81 milliGRAM(s) Enteral Tube daily  chlorhexidine 0.12% Liquid 15 milliLiter(s) Oral Mucosa every 12 hours  chlorhexidine 2% Cloths 1 Application(s) Topical daily  dexMEDEtomidine Infusion 0.2 MICROgram(s)/kG/Hr (3.97 mL/Hr) IV Continuous <Continuous>  dextrose 50% Injectable 25 Gram(s) IV Push once  dextrose 50% Injectable 25 Gram(s) IV Push once  dextrose 50% Injectable 12.5 Gram(s) IV Push once  dextrose Oral Gel 15 Gram(s) Oral once  escitalopram 10 milliGRAM(s) Oral daily  glucagon  Injectable 1 milliGRAM(s) IntraMuscular once  heparin   Injectable 5000 Unit(s) SubCutaneous every 8 hours  insulin lispro (ADMELOG) corrective regimen sliding scale   SubCutaneous every 6 hours  insulin NPH human recombinant 3 Unit(s) SubCutaneous every 6 hours  levETIRAcetam  IVPB 250 milliGRAM(s) IV Intermittent every 12 hours  lidocaine   4% Patch 1 Patch Transdermal every 24 hours  pantoprazole  Injectable 40 milliGRAM(s) IV Push every 12 hours  petrolatum Ophthalmic Ointment 1 Application(s) Left EYE at bedtime  piperacillin/tazobactam IVPB.. 3.375 Gram(s) IV Intermittent every 8 hours  polyethylene glycol 3350 17 Gram(s) Oral daily  senna Syrup 10 milliLiter(s) Oral at bedtime  sodium chloride 3%  Inhalation 4 milliLiter(s) Inhalation every 6 hours  sucralfate suspension 1 Gram(s) Enteral Tube two times a day    MEDICATIONS  (PRN):      ALLERGIES:  Allergies    fluoroquinolone antibiotics (Other)  Cipro (Unknown)  Tegretol (Unknown)  carbamazepine (Other)    Intolerances        LABS:                        8.0    11.18 )-----------( 279      ( 28 Jan 2024 01:15 )             26.0     01-28    142  |  104  |  39<H>  ----------------------------<  324<H>  4.0   |  26  |  1.57<H>    Ca    8.2<L>      28 Jan 2024 01:15  Phos  3.5     01-28  Mg     2.30     01-28    TPro  6.5  /  Alb  2.6<L>  /  TBili  0.3  /  DBili  x   /  AST  11  /  ALT  12  /  AlkPhos  89  01-28    PT/INR - ( 28 Jan 2024 01:15 )   PT: 12.3 sec;   INR: 1.10 ratio         PTT - ( 28 Jan 2024 01:15 )  PTT:29.4 sec  Urinalysis Basic - ( 28 Jan 2024 01:15 )    Color: x / Appearance: x / SG: x / pH: x  Gluc: 324 mg/dL / Ketone: x  / Bili: x / Urobili: x   Blood: x / Protein: x / Nitrite: x   Leuk Esterase: x / RBC: x / WBC x   Sq Epi: x / Non Sq Epi: x / Bacteria: x        RADIOLOGY & ADDITIONAL TESTS: Reviewed.   INTERVAL HPI/OVERNIGHT EVENTS: No o/n events    SUBJECTIVE: Patient seen and examined at bedside. ROS unable to obtain due to mental status      VITAL SIGNS:  ICU Vital Signs Last 24 Hrs  T(C): 37.2 (28 Jan 2024 04:00), Max: 37.2 (28 Jan 2024 04:00)  T(F): 98.9 (28 Jan 2024 04:00), Max: 98.9 (28 Jan 2024 04:00)  HR: 90 (28 Jan 2024 06:33) (60 - 94)  BP: 115/48 (28 Jan 2024 06:30) (103/52 - 142/58)  BP(mean): 64 (28 Jan 2024 06:30) (64 - 83)  ABP: --  ABP(mean): --  RR: 26 (28 Jan 2024 06:30) (15 - 26)  SpO2: 99% (28 Jan 2024 06:33) (97% - 100%)    O2 Parameters below as of 28 Jan 2024 06:30  Patient On (Oxygen Delivery Method): ventilator    O2 Concentration (%): 60      Mode: AC/ CMV (Assist Control/ Continuous Mandatory Ventilation), RR (machine): 16, TV (machine): 400, FiO2: 60, PEEP: 6, MAP: 12, PIP: 24  Plateau pressure:   P/F ratio:     01-27 @ 07:01  -  01-28 @ 07:00  --------------------------------------------------------  IN: 2166.8 mL / OUT: 680 mL / NET: 1486.8 mL      CAPILLARY BLOOD GLUCOSE      POCT Blood Glucose.: 325 mg/dL (28 Jan 2024 05:48)    ECG:    PHYSICAL EXAM:     VITALS:   T(C): 37.2 (01-28-24 @ 04:00), Max: 37.2 (01-28-24 @ 04:00)  HR: 90 (01-28-24 @ 06:33) (60 - 94)  BP: 115/48 (01-28-24 @ 06:30) (103/52 - 142/58)  RR: 26 (01-28-24 @ 06:30) (15 - 26)  SpO2: 99% (01-28-24 @ 06:33) (97% - 100%)    GENERAL: NAD, lying in bed comfortably  HEAD:  Atraumatic, Normocephalic  EYES: EOMI, PERRLA, conjunctiva and sclera clear  ENT: Moist mucous membranes  NECK: Supple, No JVD  CHEST/LUNG: CTABL; No rales, rhonchi, wheezing, or rubs. Unlabored respirations  HEART: RRR. No M/R/G  ABDOMEN: Soft, nontender, non-distended, normoactive BS. No hepatomegaly  EXTREMITIES:  2+ Peripheral Pulses, brisk capillary refill. No clubbing, cyanosis, or edema  NERVOUS SYSTEM:  Alert & Oriented X3, speech clear. No deficits, CN II-XII intact. Normal sensation   MSK: FROM all 4 extremities, full and equal strength  PSYCH: Normal affect, normal speech, normal behavior  SKIN: No rashes or lesions      MEDICATIONS:  MEDICATIONS  (STANDING):  albuterol/ipratropium for Nebulization 3 milliLiter(s) Nebulizer every 6 hours  artificial  tears Solution 1 Drop(s) Left EYE four times a day  aspirin  chewable 81 milliGRAM(s) Enteral Tube daily  chlorhexidine 0.12% Liquid 15 milliLiter(s) Oral Mucosa every 12 hours  chlorhexidine 2% Cloths 1 Application(s) Topical daily  dexMEDEtomidine Infusion 0.2 MICROgram(s)/kG/Hr (3.97 mL/Hr) IV Continuous <Continuous>  dextrose 50% Injectable 25 Gram(s) IV Push once  dextrose 50% Injectable 25 Gram(s) IV Push once  dextrose 50% Injectable 12.5 Gram(s) IV Push once  dextrose Oral Gel 15 Gram(s) Oral once  escitalopram 10 milliGRAM(s) Oral daily  glucagon  Injectable 1 milliGRAM(s) IntraMuscular once  heparin   Injectable 5000 Unit(s) SubCutaneous every 8 hours  insulin lispro (ADMELOG) corrective regimen sliding scale   SubCutaneous every 6 hours  insulin NPH human recombinant 3 Unit(s) SubCutaneous every 6 hours  levETIRAcetam  IVPB 250 milliGRAM(s) IV Intermittent every 12 hours  lidocaine   4% Patch 1 Patch Transdermal every 24 hours  pantoprazole  Injectable 40 milliGRAM(s) IV Push every 12 hours  petrolatum Ophthalmic Ointment 1 Application(s) Left EYE at bedtime  piperacillin/tazobactam IVPB.. 3.375 Gram(s) IV Intermittent every 8 hours  polyethylene glycol 3350 17 Gram(s) Oral daily  senna Syrup 10 milliLiter(s) Oral at bedtime  sodium chloride 3%  Inhalation 4 milliLiter(s) Inhalation every 6 hours  sucralfate suspension 1 Gram(s) Enteral Tube two times a day    MEDICATIONS  (PRN):      ALLERGIES:  Allergies    fluoroquinolone antibiotics (Other)  Cipro (Unknown)  Tegretol (Unknown)  carbamazepine (Other)    Intolerances        LABS:                        8.0    11.18 )-----------( 279      ( 28 Jan 2024 01:15 )             26.0     01-28    142  |  104  |  39<H>  ----------------------------<  324<H>  4.0   |  26  |  1.57<H>    Ca    8.2<L>      28 Jan 2024 01:15  Phos  3.5     01-28  Mg     2.30     01-28    TPro  6.5  /  Alb  2.6<L>  /  TBili  0.3  /  DBili  x   /  AST  11  /  ALT  12  /  AlkPhos  89  01-28    PT/INR - ( 28 Jan 2024 01:15 )   PT: 12.3 sec;   INR: 1.10 ratio         PTT - ( 28 Jan 2024 01:15 )  PTT:29.4 sec  Urinalysis Basic - ( 28 Jan 2024 01:15 )    Color: x / Appearance: x / SG: x / pH: x  Gluc: 324 mg/dL / Ketone: x  / Bili: x / Urobili: x   Blood: x / Protein: x / Nitrite: x   Leuk Esterase: x / RBC: x / WBC x   Sq Epi: x / Non Sq Epi: x / Bacteria: x        RADIOLOGY & ADDITIONAL TESTS: Reviewed.

## 2024-01-28 NOTE — PROGRESS NOTE ADULT - SUBJECTIVE AND OBJECTIVE BOX
Patient seen and examined.  +febrile this am    REVIEW OF SYSTEMS:  UTO    MEDICATIONS  (STANDING):  albuterol/ipratropium for Nebulization 3 milliLiter(s) Nebulizer every 6 hours  artificial  tears Solution 1 Drop(s) Left EYE four times a day  aspirin  chewable 81 milliGRAM(s) Enteral Tube daily  chlorhexidine 0.12% Liquid 15 milliLiter(s) Oral Mucosa every 12 hours  chlorhexidine 2% Cloths 1 Application(s) Topical daily  dexMEDEtomidine Infusion 0.2 MICROgram(s)/kG/Hr (3.97 mL/Hr) IV Continuous <Continuous>  dextrose 50% Injectable 25 Gram(s) IV Push once  dextrose 50% Injectable 25 Gram(s) IV Push once  dextrose 50% Injectable 12.5 Gram(s) IV Push once  dextrose Oral Gel 15 Gram(s) Oral once  escitalopram 10 milliGRAM(s) Oral daily  glucagon  Injectable 1 milliGRAM(s) IntraMuscular once  heparin   Injectable 5000 Unit(s) SubCutaneous every 8 hours  insulin lispro (ADMELOG) corrective regimen sliding scale   SubCutaneous every 6 hours  insulin NPH human recombinant 3 Unit(s) SubCutaneous every 6 hours  levETIRAcetam  IVPB 250 milliGRAM(s) IV Intermittent every 12 hours  lidocaine   4% Patch 1 Patch Transdermal every 24 hours  pantoprazole  Injectable 40 milliGRAM(s) IV Push every 12 hours  petrolatum Ophthalmic Ointment 1 Application(s) Left EYE at bedtime  piperacillin/tazobactam IVPB.. 3.375 Gram(s) IV Intermittent every 8 hours  polyethylene glycol 3350 17 Gram(s) Oral daily  senna Syrup 10 milliLiter(s) Oral at bedtime  sodium chloride 3%  Inhalation 4 milliLiter(s) Inhalation every 6 hours  sucralfate suspension 1 Gram(s) Enteral Tube two times a day      VITAL:  T(C): , Max: 38.3 (01-28-24 @ 08:00)  T(F): , Max: 101 (01-28-24 @ 08:00)  HR: 86 (01-28-24 @ 10:00)  BP: 121/49 (01-28-24 @ 10:00)  BP(mean): 66 (01-28-24 @ 10:00)  RR: 21 (01-28-24 @ 10:00)  SpO2: 100% (01-28-24 @ 10:00)  Wt(kg): --    I and O's:    01-27 @ 07:01 - 01-28 @ 07:00  --------------------------------------------------------  IN: 2236.5 mL / OUT: 680 mL / NET: 1556.5 mL    01-28 @ 07:01 - 01-28 @ 10:20  --------------------------------------------------------  IN: 278.8 mL / OUT: 0 mL / NET: 278.8 mL          PHYSICAL EXAM:    Constitutional: on vent  HEENT:  intubated  Neck: No LAD, No JVD  Respiratory: diminished b/l  Cardiovascular: S1 and S2  Gastrointestinal: BS+, soft, NT/ND  Extremities: + l/e edema  Neurological: UTO  : + Moss  Skin: No rashes  Access: Not applicable      LABS:                        8.0    11.18 )-----------( 279      ( 28 Jan 2024 01:15 )             26.0     01-28    142  |  104  |  39<H>  ----------------------------<  324<H>  4.0   |  26  |  1.57<H>    Ca    8.2<L>      28 Jan 2024 01:15  Phos  3.5     01-28  Mg     2.30     01-28    TPro  6.5  /  Alb  2.6<L>  /  TBili  0.3  /  DBili  x   /  AST  11  /  ALT  12  /  AlkPhos  89  01-28      Urine Studies:  Urinalysis Basic - ( 28 Jan 2024 01:15 )    Color: x / Appearance: x / SG: x / pH: x  Gluc: 324 mg/dL / Ketone: x  / Bili: x / Urobili: x   Blood: x / Protein: x / Nitrite: x   Leuk Esterase: x / RBC: x / WBC x   Sq Epi: x / Non Sq Epi: x / Bacteria: x      Assessment and Plan:   · Assessment	  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  Renal fxn remains stable   Hypophosphatemia- stable  Hypokalemia - stable  Hypertensive urgency -resolved --- BP meds as ordered; BP acceptable   Pulm - resp failure - intubated  EEG pending   hypernatremia --improved/stable    1 Renal - crt stable, no objection to lasix 40mg IV once  PRN , in case of distress  continue free water 150 cc every 6h  Trend Phos level daily   4 CV - BP controlled   5 Vasc - s/p SMA stent placement;   6 Sx - s/p HIDA + for acute asa, medical management, .tube feedings   7 GI - s/p EGD clipping of bleeding duodenal ulcers   8 Anemia-continue to trend H/H; suggest PRBC for Hgb <7.0; transfuse per primary team   9 Pul - intubated  asper ICU   10- ID follow up; patient noted to be febrile this am.  11 Glycemic control as able               RADIOLOGY & ADDITIONAL STUDIES:        ASSESSMENT      RECOMMENDATIONS

## 2024-01-28 NOTE — PROGRESS NOTE ADULT - SUBJECTIVE AND OBJECTIVE BOX
Date of Service: 24 @ 09:06    Patient is a 89y old  Male who presents with a chief complaint of COVID19, Chest pain (2024 07:01)      Any change in ROS: remains intubated : sedated     MEDICATIONS  (STANDING):  acetaminophen   IVPB .. 1000 milliGRAM(s) IV Intermittent once  albuterol/ipratropium for Nebulization 3 milliLiter(s) Nebulizer every 6 hours  artificial  tears Solution 1 Drop(s) Left EYE four times a day  aspirin  chewable 81 milliGRAM(s) Enteral Tube daily  chlorhexidine 0.12% Liquid 15 milliLiter(s) Oral Mucosa every 12 hours  chlorhexidine 2% Cloths 1 Application(s) Topical daily  dexMEDEtomidine Infusion 0.2 MICROgram(s)/kG/Hr (3.97 mL/Hr) IV Continuous <Continuous>  dextrose 50% Injectable 25 Gram(s) IV Push once  dextrose 50% Injectable 25 Gram(s) IV Push once  dextrose 50% Injectable 12.5 Gram(s) IV Push once  dextrose Oral Gel 15 Gram(s) Oral once  escitalopram 10 milliGRAM(s) Oral daily  glucagon  Injectable 1 milliGRAM(s) IntraMuscular once  heparin   Injectable 5000 Unit(s) SubCutaneous every 8 hours  insulin lispro (ADMELOG) corrective regimen sliding scale   SubCutaneous every 6 hours  insulin NPH human recombinant 3 Unit(s) SubCutaneous every 6 hours  levETIRAcetam  IVPB 250 milliGRAM(s) IV Intermittent every 12 hours  lidocaine   4% Patch 1 Patch Transdermal every 24 hours  pantoprazole  Injectable 40 milliGRAM(s) IV Push every 12 hours  petrolatum Ophthalmic Ointment 1 Application(s) Left EYE at bedtime  piperacillin/tazobactam IVPB.. 3.375 Gram(s) IV Intermittent every 8 hours  polyethylene glycol 3350 17 Gram(s) Oral daily  senna Syrup 10 milliLiter(s) Oral at bedtime  sodium chloride 3%  Inhalation 4 milliLiter(s) Inhalation every 6 hours  sucralfate suspension 1 Gram(s) Enteral Tube two times a day    MEDICATIONS  (PRN):    Vital Signs Last 24 Hrs  T(C): 38.3 (2024 08:00), Max: 38.3 (2024 08:00)  T(F): 101 (2024 08:00), Max: 101 (2024 08:00)  HR: 85 (2024 08:00) (60 - 94)  BP: 122/50 (2024 08:00) (103/52 - 142/58)  BP(mean): 68 (2024 08:00) (64 - 83)  RR: 19 (2024 08:00) (15 - 26)  SpO2: 100% (2024 08:00) (97% - 100%)    Parameters below as of 2024 08:00  Patient On (Oxygen Delivery Method): AC 16/400/6    O2 Concentration (%): 60  Mode: AC/ CMV (Assist Control/ Continuous Mandatory Ventilation)  RR (machine): 16  TV (machine): 400  FiO2: 60  PEEP: 6  MAP: 12  PIP: 24    I&O's Summary    2024 07:01  -  2024 07:00  --------------------------------------------------------  IN: 2236.5 mL / OUT: 680 mL / NET: 1556.5 mL    2024 07:01  -  2024 09:06  --------------------------------------------------------  IN: 109.7 mL / OUT: 0 mL / NET: 109.7 mL          Physical Exam:   GENERAL: NAD, well-groomed, well-developed  HEENT: JAVAD/   Atraumatic, Normocephalic  ENMT: No tonsillar erythema, exudates, or enlargement; Moist mucous membranes, Good dentition, No lesions  NECK: Supple, No JVD, Normal thyroid  CHEST/LUNG: Clear to auscultaion  CVS: Regular rate and rhythm; No murmurs, rubs, or gallops  GI: : Soft, Nontender, Nondistended; Bowel sounds present  NERVOUS SYSTEM:  intubated and sedated and febrile  EXTREMITIES:  - edema  LYMPH: No lymphadenopathy noted  SKIN: No rashes or lesions  ENDOCRINOLOGY: No Thyromegaly  PSYCH: intubated and sedated    Labs:  30, 30, 33, 32, 28                            8.0    11.18 )-----------( 279      ( 2024 01:15 )             26.0                         7.6    9.03  )-----------( 232      ( 2024 01:05 )             23.8                         7.6    10.08 )-----------( 226      ( 2024 01:00 )             23.8                         8.8    17.70 )-----------( 332      ( 2024 01:25 )             26.7     01    142  |  104  |  39<H>  ----------------------------<  324<H>  4.0   |  26  |  1.57<H>      142  |  102  |  37<H>  ----------------------------<  285<H>  3.7   |  29  |  1.57<H>      146<H>  |  105  |  42<H>  ----------------------------<  132<H>  3.3<L>   |  31  |  1.59<H>      143  |  103  |  36<H>  ----------------------------<  207<H>  4.1   |  28  |  1.52<H>    Ca    8.2<L>      2024 01:15  Ca    7.5<L>      2024 01:05  Phos  3.5       Phos  3.3     27  Mg     2.30       Mg     2.10         TPro  6.5  /  Alb  2.6<L>  /  TBili  0.3  /  DBili  x   /  AST  11  /  ALT  12  /  AlkPhos  89  28  TPro  6.2  /  Alb  2.4<L>  /  TBili  0.4  /  DBili  x   /  AST  14  /  ALT  11  /  AlkPhos  57    TPro  6.1  /  Alb  2.4<L>  /  TBili  0.3  /  DBili  x   /  AST  17  /  ALT  12  /  AlkPhos  75    TPro  7.2  /  Alb  2.7<L>  /  TBili  0.4  /  DBili  x   /  AST  20  /  ALT  17  /  AlkPhos  89      CAPILLARY BLOOD GLUCOSE      POCT Blood Glucose.: 325 mg/dL (2024 05:48)  POCT Blood Glucose.: 326 mg/dL (2024 23:51)  POCT Blood Glucose.: 299 mg/dL (2024 16:49)  POCT Blood Glucose.: 306 mg/dL (2024 12:05)      LIVER FUNCTIONS - ( 2024 01:15 )  Alb: 2.6 g/dL / Pro: 6.5 g/dL / ALK PHOS: 89 U/L / ALT: 12 U/L / AST: 11 U/L / GGT: x           PT/INR - ( 2024 01:15 )   PT: 12.3 sec;   INR: 1.10 ratio         PTT - ( 2024 01:15 )  PTT:29.4 sec  Urinalysis Basic - ( 2024 01:15 )    Color: x / Appearance: x / SG: x / pH: x  Gluc: 324 mg/dL / Ketone: x  / Bili: x / Urobili: x   Blood: x / Protein: x / Nitrite: x   Leuk Esterase: x / RBC: x / WBC x   Sq Epi: x / Non Sq Epi: x / Bacteria: x            RECENT CULTURES:   @ 17:25 .Body Fluid gallbladder       No polymorphonuclear leukocytes per low power field  No organisms seen per oil power field           No growth to date.     @ 17:32 .Blood Blood       rad  · Procedure Name	Interventional Radiology  · Procedure Name	percutaneous cholecystostomy catheter insertion  · TIME OUT	Patient's first and last name, , procedure, and correct site confirmed prior to the start of procedure.  · Procedure Date/Time	2024 17:00  · Informed Consent	Benefits, risks, and possible complications of procedure explained to patient/caregiver who verbalized understanding and gave written consent.  · Procedure Performed By	Myself  · Specimen Obtained	Fluid sent for gram stain and culture  · Estimated Blood Loss	Minimal  · Complications	No complications  · Sedation	Provided by Anesthesia Department  · Local Anesthesia	1% Lidocaine  · Procedure Findings and Details	technically successful US and fluoroscopic guided transperitoneal cholecystostomy catheter insertion.  · Patient Condition/Disposition	Back to ICU  · Plan	-f/u cultures  -forward flush drain daily with 5cc NS  -care per primary team  < from: [facility] Hepatobiliary Imaging (24 @ 12:00) >    TECHNIQUE:  Dynamic images of the anterior abdomen were obtained for 2   hours following radiopharmaceutical injection followed by static images   ofthe abdomen in the anterior and right anterior oblique projections.    At the request of the referring service morphine was not administered.    COMPARISON: Hepatobiliary scan performed on 2023 was reviewed.    FINDINGS: There is prompt, homogeneous uptake of radiopharmaceutical by   the hepatocytes. Activity is first seen in the bowel at about 15 minutes.   The gallbladder is not visualized at any time during the study. There is   good clearance of activity from the liver at the end of thestudy.    IMPRESSION: Abnormal hepatobiliary scan: In the proper clinical setting   findings are consistent with acute cholecystitis.    Dr. Rivera was informed of these results by Dr. OMI Mckee via   telephone with read back at about 12:35 PM on 2024.    --- End of Report ---            LUISITO MCKEE MD; Attending Nuclear Medicine  This document has been electronically signed. 2024  1:03PM    < end of copied text >           No growth at 72 Hours     @ 15:05 .Bronchial Bronchial       Moderate polymorphonuclear leukocytes seen per low power field  No squamous epithelial cells per low power field  No organisms seen per oil power field           Normal Respiratory Aurelia present          RESPIRATORY CULTURES:          Studies  Chest X-RAY  CT SCAN Chest   Venous Dopplers: LE:   CT Abdomen  Others

## 2024-01-28 NOTE — PROGRESS NOTE ADULT - ASSESSMENT
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     PLAN  =====Neurologic=====  #CVA  - prior CVA x3 with no residual deficits  #myoclonic jerks  - on Keppra will continue  - holding home  gabapentin and memantine in setting of somnolence  - continue lexapro  - wean precedex as tolerated       =====Pulmonary=====  #COVID  - s/p paxlovid and 1 dose remdesivir while inpatient  #Pleural effusions b/l  - CT chest from 1/16 showing new small bilateral pleural effusions/ atelectasis/ patchy bilateral ground-glass opacities are consistent with pulmonary edema.  - currently on zosyn for aspiration PNA from 1/20- 1/28   - s/p bronch for increasing secretions- on duonebs and HTS currently, intermittent IPV BID   - bronchial cultures with staph aureus, continue zosyn   - intubated on 1/22 for airway support, currently off sedatives attempt to extubate   - on solumedrol 40mg since 1/22- weaning to 30mg on 1/26 for 2 days and subsequently to 20mg x2days, 10mg x2 days -> will hold for now (1/27)   - POCUS with resolution of b/l pleural effusions, still holding brilinta until next week possible reaccumulation requiring pleural tap     =====Cardiovascular=====  Patient does not currently require vasopressors and is normotensive  #HTN  - on home amlodipine and metoprolol currently holding     #CAD  - on Brilinta (holding) aspirin and ranolazine continue   - as per vascular okay to hold Brilinta for possible pleural effusion thoracentesis  -have not heard back from cardiology regarding Brilinta / last stent 2022 , will hold     =====GI=====  #upper GI bleed  - s/p EGD showing dieulafoy lesion and esophagitis likely 2/2 NGT irritation s/p 2 clips  - on protonix IV BID continue   - CT on 1/25 with cholelithiasis and sludge HIDA on 1/26 confirming likely acute choley, plan for IR perc cholecystostomy 1/26     #Diet  - tube feeds NGT     =====Renal/=====  #Electrolytes  - Maintain K>4, Phos>3, Mag>2, iCal>1  - baseline cr 1.5, at baseline      =====Endocrine=====  #DM2  - on NPH 16 units q6 and SSI given solumedrol   - a1c 9.3, glucose wnl inpatient   - hypoglycemic overnight 1/26  started on d10 drips and insulin DC'd  - CTM w/ FSGs, will consider restarting insulin at lower dose      =====Infectious Disease=====  #COVID   - s/p paxlovid and 1 dose remdesivir  # PNA  - CT chest showing ground glass opacities  - continue zosyn  - intermittent episodes of fevers, likely source GI given choleycystitis? culture NGTD  - ID consult 1/24-  CT maxillofacial CT head wnl  - CT chest with evolving GGO since 1/16    =====Heme/Onc=====  #DVT Ppx  - heparin sub-q    =====Ethics=====  FULL CODE. 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     PLAN  =====Neurologic=====  #CVA  - prior CVA x3 with no residual deficits  #myoclonic jerks  - on Keppra will continue  - holding home  gabapentin and memantine in setting of somnolence  - continue lexapro  - wean precedex as tolerated       =====Pulmonary=====  #COVID  - s/p paxlovid and 1 dose remdesivir while inpatient  #Pleural effusions b/l  - CT chest from 1/16 showing new small bilateral pleural effusions/ atelectasis/ patchy bilateral ground-glass opacities are consistent with pulmonary edema.  - currently on zosyn for aspiration PNA from 1/20- 1/28   - s/p bronch for increasing secretions- on duonebs and HTS currently, intermittent IPV BID   - bronchial cultures with staph aureus, continue zosyn   - intubated on 1/22 for airway support, currently off sedatives attempt to extubate   - on solumedrol 40mg since 1/22- weaning to 30mg on 1/26 for 2 days and subsequently to 20mg x2days, 10mg x2 days -> will hold for now (1/27)   - POCUS with resolution of b/l pleural effusions, still holding brilinta until next week possible reaccumulation requiring pleural tap     =====Cardiovascular=====  Patient does not currently require vasopressors and is normotensive  #HTN  - on home amlodipine and metoprolol currently holding     #CAD  - on Brilinta (holding) aspirin and ranolazine continue   - as per vascular okay to hold Brilinta for possible pleural effusion thoracentesis  -have not heard back from cardiology regarding Brilinta / last stent 2022 , will hold     =====GI=====  #upper GI bleed  - s/p EGD showing dieulafoy lesion and esophagitis likely 2/2 NGT irritation s/p 2 clips  - on protonix IV BID continue   - CT on 1/25 with cholelithiasis and sludge HIDA on 1/26 confirming likely acute choley, plan for IR perc cholecystostomy 1/26     #Diet  - tube feeds NGT     =====Renal/=====  #Electrolytes  - Maintain K>4, Phos>3, Mag>2, iCal>1  - baseline cr 1.5, at baseline      =====Endocrine=====  #DM2  - on NPH 16 units q6 and SSI given solumedrol   - a1c 9.3, glucose wnl inpatient   - hypoglycemic overnight 1/26  started on d10 drips and insulin DC'd  - CTM w/ FSGs, will consider restarting insulin at lower dose  -NPH increasaed to 6u 1/28 due to hyperglycemia      =====Infectious Disease=====  #COVID   - s/p paxlovid and 1 dose remdesivir  # PNA  - CT chest showing ground glass opacities  - continue zosyn  - intermittent episodes of fevers, likely source GI given choleycystitis? culture NGTD  - ID consult 1/24-  CT maxillofacial CT head wnl  - CT chest with evolving GGO since 1/16    =====Heme/Onc=====  #DVT Ppx  - heparin sub-q    =====Ethics=====  FULL CODE.

## 2024-01-28 NOTE — PROGRESS NOTE ADULT - ATTENDING COMMENTS
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) admitted to MICU for acute respiratory failure, worsening s/p bronchoscopy 1/19, worsening hypoxemia and mental status requiring intubation (1/22). Now s/p perc asa 1/26 for cholecystitis    #Acute hypoxemic respiratory failure  #Staph Aureus Pneumonia  #Dysphagia  - awake and responsive this morning, following commands  - on vent support for MSSA pna/possible aspiration pneumonia, doing well on pressure control, will perform SBT as tolerated  - c/w tube feeds   - c/w zosyn, bronchial cx from 1/22 negative. s/p perc asa 1/26 for cholecystitis. recent cultures negative thus far. will discuss with infectious disease. no clear hematoma seen on exam this AM over LUE  - okay to stop Brilinta from vascular standpoint for possible procedure; stopped Brilinta given family concern for need for thora (cardiac stents >1 year ago); pocus from yesterday showing small right effusion with small-moderate left effusion. simple in appearance. Low suspicion that effusions are primary cause of hypoxemia/need for ventilatory support. Continued improvement in respiratory status. Will continue abx and airway clearance. Can likely resume brilinta if tolerating SBT  - discussed with family potential for PEG tube prior to extubation - will discuss further when family at bedside  - steroids d/c'ed due to hyperglycemia, s/p taper  - glucose control with insulin .

## 2024-01-28 NOTE — PROGRESS NOTE ADULT - SUBJECTIVE AND OBJECTIVE BOX
Aashish Boyer MD  Interventional Cardiology / Advance Heart Failure and Cardiac Transplant Specialist  [place] Office : [phone] [place] [address] [place] [address]  Tel:   [place] Office : [address] [facility] [place] [address]  Tel: [phone]       Pt is lying in bed intubated  	  MEDICATIONS:  aspirin  chewable 81 milliGRAM(s) Enteral Tube daily  heparin   Injectable 5000 Unit(s) SubCutaneous every 8 hours    meropenem  IVPB 1000 milliGRAM(s) IV Intermittent every 12 hours    albuterol/ipratropium for Nebulization 3 milliLiter(s) Nebulizer every 6 hours  sodium chloride 3%  Inhalation 4 milliLiter(s) Inhalation every 6 hours    dexMEDEtomidine Infusion 0.2 MICROgram(s)/kG/Hr IV Continuous <Continuous>  escitalopram 10 milliGRAM(s) Oral daily  levETIRAcetam  IVPB 250 milliGRAM(s) IV Intermittent every 12 hours    pantoprazole  Injectable 40 milliGRAM(s) IV Push every 12 hours  polyethylene glycol 3350 17 Gram(s) Oral daily  senna Syrup 10 milliLiter(s) Oral at bedtime  sucralfate suspension 1 Gram(s) Enteral Tube two times a day    dextrose 50% Injectable 25 Gram(s) IV Push once  dextrose 50% Injectable 25 Gram(s) IV Push once  dextrose 50% Injectable 12.5 Gram(s) IV Push once  dextrose Oral Gel 15 Gram(s) Oral once  glucagon  Injectable 1 milliGRAM(s) IntraMuscular once  insulin lispro (ADMELOG) corrective regimen sliding scale   SubCutaneous every 6 hours  insulin NPH human recombinant 6 Unit(s) SubCutaneous every 6 hours    artificial  tears Solution 1 Drop(s) Left EYE four times a day  chlorhexidine 0.12% Liquid 15 milliLiter(s) Oral Mucosa every 12 hours  chlorhexidine 2% Cloths 1 Application(s) Topical daily  lidocaine   4% Patch 1 Patch Transdermal every 24 hours  petrolatum Ophthalmic Ointment 1 Application(s) Left EYE at bedtime      PAST MEDICAL/SURGICAL HISTORY  PAST MEDICAL & SURGICAL HISTORY:  Hyperlipemia      Hypertension      Coronary Artery Disease      Diabetes Mellitus Type II      Stented Coronary Artery  total 5 stents, last stent 5/2019      Neuropathy      Myocardial infarction      Stroke  mild left facial numbness   no other residuals verbalized      Myoclonic jerking      Stage 3 chronic kidney disease      History of Cataract Extraction      Hx of CABG      H/O coronary angiogram      S/P coronary artery stent placement  1/6/09      S/P placement of cardiac pacemaker          SOCIAL HISTORY: Substance Use (street drugs): ( x ) never used  (  ) other:    FAMILY HISTORY:  No pertinent family history in first degree relatives           PHYSICAL EXAM:  T(C): 37.6 (01-28-24 @ 16:00), Max: 38.3 (01-28-24 @ 08:00)  HR: 87 (01-28-24 @ 19:40) (67 - 94)  BP: 127/49 (01-28-24 @ 19:00) (115/48 - 142/58)  RR: 15 (01-28-24 @ 19:00) (13 - 29)  SpO2: 99% (01-28-24 @ 19:40) (97% - 100%)  Wt(kg): --  I&O's Summary    27 Jan 2024 07:01  -  28 Jan 2024 07:00  --------------------------------------------------------  IN: 2236.5 mL / OUT: 680 mL / NET: 1556.5 mL    28 Jan 2024 07:01  -  28 Jan 2024 20:06  --------------------------------------------------------  IN: 836.4 mL / OUT: 440 mL / NET: 396.4 mL          GENERAL: intubated  EYES:   PERRLA   ENMT:   Moist mucous membranes, Good dentition, No lesions  Cardiovascular: Normal S1 S2, No JVD, No murmurs, No edema  Respiratory: b/l rhonchi   Gastrointestinal:  Soft, Non-tender, + BS	  Extremities: no edema                                    8.0    11.18 )-----------( 279      ( 28 Jan 2024 01:15 )             26.0     01-28    142  |  104  |  39<H>  ----------------------------<  324<H>  4.0   |  26  |  1.57<H>    Ca    8.2<L>      28 Jan 2024 01:15  Phos  3.5     01-28  Mg     2.30     01-28    TPro  6.5  /  Alb  2.6<L>  /  TBili  0.3  /  DBili  x   /  AST  11  /  ALT  12  /  AlkPhos  89  01-28    proBNP:   Lipid Profile:   HgA1c:   TSH:     Consultant(s) Notes Reviewed:  [x ] YES  [ ] NO    Care Discussed with Consultants/Other Providers [ x] YES  [ ] NO    Imaging Personally Reviewed independently:  [x] YES  [ ] NO    All labs, radiologic studies, vitals, orders and medications list reviewed. Patient is seen and examined at bedside. Case discussed with medical team.

## 2024-01-28 NOTE — PROGRESS NOTE ADULT - ASSESSMENT
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 Patient states that he has been having a dry cough for the past week, worsened over the past few days. Denies SOB with the cough, denies hemoptysis. Reports fevers at home to 101.5 with associated diaphoresis. Said that he has significant pinprick like b/l chest pain with his cough but denies any chest pain when not coughing or with ambulation. Also reports rhinorrhea and sore throat. Reports generalized weakness and poor appetite. States his daughter was visiting him over the week end last week and she was positive for COVID19. Patient tested positive for COVID19 2 days prior and was started on paxlovid as outpatient. Of note, family states that the patient has had worsening confusion at home over the past 6 months (becoming disoriented to time) but recently has been more confused, talking about seeing people that are not present.   On arrival to the ED his vitals were T 98 -> 99.2, P 80, /72, RR 18, ) sat 100% RA. His lab work showed leukocytosis with neutrophilia and bandemia, MABLE, mild hyponatremia, indeterminant but stable troponins, and elevated lactate to 2.3. His COVID19 swab was positive. His CXR showed bibasilar crackles.  (23 Dec 2023 22:51):  pt has been here for past two weeks and now pulm called fro excessive secretions   he is able to answer simple questions:  grand sons at bedside:     Covid / Resp failure/on 2 L of oxygen  :  CADS/P CABG  DM  CKD  HTN  CVA X 3    Acute Cholecystitis   -New:  s/p perc asa tune:   -on antibtiocs:   -on no vasopressors:    -id following   Covid / Resp failure/on 2 L of oxygen:  -he was treated for covid before:   -he has excessive secretions  -keep o2 sao2 above 90%  -add BD and mucomyst: ;  -add mucinex:   1/10: he seems to be doing  ok: cont mucomyst and bd   1/11 ?: add benzonatate and Robitussin prm : dc dornase  1/12: seems OK: no sob:  no cough : no phlegm : has gurgling sound in throat:  looks comfortable  1/14: he is on room air:  with good sao2:  finished covid rx:  cont current rx:  high risk for aspiration as he has gurgling secretions in throat   1/15: would do ct chest he seems to have increasing effusions:  his ct scan from last week of december:  has not much of effusions: however will try lasix : [facility]    1/16: doing  ok : no os:b has secretions still : change to percussion bed:  cont bd  1/17: seems stable:  no sob: on 3 L of oxygen : should add ATC lasix to me as he has formed new pleural effusions:  echo with mod AS   1/18; dw pts son : who is a neurologist:  he has secretions in chest with atelectasis and yovani effusions with increased secretions:  the son requests bronchoscopy:  I have explained the advantages and disadvantages of the bronch at this age and he might not be able to be extubated soon after procedure:  but they want to go ahead with it: IP called   He will remain art risk for recurrent aspiration and atelectasis even after the procedure and they understand that    1/19: FOR BRONCH TODAY  : AGAIN CONFIRMED WITH NEUROLOGISTS SON  1/20 : events noted:  was successful extubated after the procedure:  but then he desaturated with increasing secretions and ended up on high flow and adm to MICU : on recent chest xray has mod large effusion on rigth side:  I think it needs a tap:  would [facility]   [facility]  1/21: dw fellow  consider tapping on rigth saide:  his PCP and son at bedside:  he is not obviously sob but still on 100%  high flow   1/22: he is doing poorly:  WBC increased:  bronch culturs with staph : nares with staph ? add vanco:  defer to MICU : in addition:  he has large effusion 0on rigth side: ? chest tube:  but brillinta needs to be stopped : [facility] attending  1/23: got intubated : sedated on vasopressors:  s/p brocnh : [facility] attending again  : possibly needs tap on rigth side   1/24: cont current rx:  defer to primary team  : s/p bronch   -cont current medications   1/28: seems same to me  : intubated and is on precedex:  cont curretn pulm care:  on antibtiocs  DM  monitor and control   CKD  -cont current meds   HTN   -controlled  1/28:   -not on vasopressors:  antihypertensives being held  CVA X 3  -doing ok :     [facility] family  and team  1/28: currently intubated   GI Bleed:   -secondary to Dieulafoy's lesion:  defer to primary team :    dvt prophylaxis

## 2024-01-29 LAB
ALBUMIN SERPL ELPH-MCNC: 2.3 G/DL — LOW (ref 3.3–5)
ALP SERPL-CCNC: 112 U/L — SIGNIFICANT CHANGE UP (ref 40–120)
ALT FLD-CCNC: 10 U/L — SIGNIFICANT CHANGE UP (ref 4–41)
ANION GAP SERPL CALC-SCNC: 9 MMOL/L — SIGNIFICANT CHANGE UP (ref 7–14)
APTT BLD: 30.5 SEC — SIGNIFICANT CHANGE UP (ref 24.5–35.6)
AST SERPL-CCNC: 13 U/L — SIGNIFICANT CHANGE UP (ref 4–40)
BASE EXCESS BLDV CALC-SCNC: 6.3 MMOL/L — HIGH (ref -2–3)
BILIRUB SERPL-MCNC: 0.2 MG/DL — SIGNIFICANT CHANGE UP (ref 0.2–1.2)
BUN SERPL-MCNC: 36 MG/DL — HIGH (ref 7–23)
CA-I SERPL-SCNC: 1.14 MMOL/L — LOW (ref 1.15–1.33)
CALCIUM SERPL-MCNC: 7.7 MG/DL — LOW (ref 8.4–10.5)
CHLORIDE BLDV-SCNC: 109 MMOL/L — HIGH (ref 96–108)
CHLORIDE SERPL-SCNC: 109 MMOL/L — HIGH (ref 98–107)
CO2 BLDV-SCNC: 33.7 MMOL/L — HIGH (ref 22–26)
CO2 SERPL-SCNC: 29 MMOL/L — SIGNIFICANT CHANGE UP (ref 22–31)
CREAT SERPL-MCNC: 1.5 MG/DL — HIGH (ref 0.5–1.3)
CULTURE RESULTS: SIGNIFICANT CHANGE UP
EGFR: 44 ML/MIN/1.73M2 — LOW
GAS PNL BLDV: 143 MMOL/L — SIGNIFICANT CHANGE UP (ref 136–145)
GAS PNL BLDV: SIGNIFICANT CHANGE UP
GAS PNL BLDV: SIGNIFICANT CHANGE UP
GLUCOSE BLDC GLUCOMTR-MCNC: 120 MG/DL — HIGH (ref 70–99)
GLUCOSE BLDC GLUCOMTR-MCNC: 165 MG/DL — HIGH (ref 70–99)
GLUCOSE BLDC GLUCOMTR-MCNC: 186 MG/DL — HIGH (ref 70–99)
GLUCOSE BLDC GLUCOMTR-MCNC: 224 MG/DL — HIGH (ref 70–99)
GLUCOSE BLDV-MCNC: 210 MG/DL — HIGH (ref 70–99)
GLUCOSE SERPL-MCNC: 207 MG/DL — HIGH (ref 70–99)
HCO3 BLDV-SCNC: 32 MMOL/L — HIGH (ref 22–29)
HCT VFR BLD CALC: 24.5 % — LOW (ref 39–50)
HCT VFR BLDA CALC: 24 % — LOW (ref 39–51)
HGB BLD CALC-MCNC: 7.9 G/DL — LOW (ref 12.6–17.4)
HGB BLD-MCNC: 7.5 G/DL — LOW (ref 13–17)
INR BLD: 1.14 RATIO — SIGNIFICANT CHANGE UP (ref 0.85–1.18)
LACTATE BLDV-MCNC: 1.9 MMOL/L — SIGNIFICANT CHANGE UP (ref 0.5–2)
MAGNESIUM SERPL-MCNC: 2.2 MG/DL — SIGNIFICANT CHANGE UP (ref 1.6–2.6)
MCHC RBC-ENTMCNC: 29.8 PG — SIGNIFICANT CHANGE UP (ref 27–34)
MCHC RBC-ENTMCNC: 30.6 GM/DL — LOW (ref 32–36)
MCV RBC AUTO: 97.2 FL — SIGNIFICANT CHANGE UP (ref 80–100)
NRBC # BLD: 0 /100 WBCS — SIGNIFICANT CHANGE UP (ref 0–0)
NRBC # FLD: 0 K/UL — SIGNIFICANT CHANGE UP (ref 0–0)
PCO2 BLDV: 53 MMHG — SIGNIFICANT CHANGE UP (ref 42–55)
PH BLDV: 7.39 — SIGNIFICANT CHANGE UP (ref 7.32–7.43)
PHOSPHATE SERPL-MCNC: 2.6 MG/DL — SIGNIFICANT CHANGE UP (ref 2.5–4.5)
PLATELET # BLD AUTO: 281 K/UL — SIGNIFICANT CHANGE UP (ref 150–400)
PO2 BLDV: 51 MMHG — HIGH (ref 25–45)
POTASSIUM BLDV-SCNC: 3.7 MMOL/L — SIGNIFICANT CHANGE UP (ref 3.5–5.1)
POTASSIUM SERPL-MCNC: 3.9 MMOL/L — SIGNIFICANT CHANGE UP (ref 3.5–5.3)
POTASSIUM SERPL-SCNC: 3.9 MMOL/L — SIGNIFICANT CHANGE UP (ref 3.5–5.3)
PROT SERPL-MCNC: 5.8 G/DL — LOW (ref 6–8.3)
PROTHROM AB SERPL-ACNC: 12.7 SEC — SIGNIFICANT CHANGE UP (ref 9.5–13)
RBC # BLD: 2.52 M/UL — LOW (ref 4.2–5.8)
RBC # FLD: 17.9 % — HIGH (ref 10.3–14.5)
SAO2 % BLDV: 80.9 % — SIGNIFICANT CHANGE UP (ref 67–88)
SODIUM SERPL-SCNC: 147 MMOL/L — HIGH (ref 135–145)
SPECIMEN SOURCE: SIGNIFICANT CHANGE UP
WBC # BLD: 12.96 K/UL — HIGH (ref 3.8–10.5)
WBC # FLD AUTO: 12.96 K/UL — HIGH (ref 3.8–10.5)

## 2024-01-29 PROCEDURE — 99232 SBSQ HOSP IP/OBS MODERATE 35: CPT

## 2024-01-29 PROCEDURE — 99291 CRITICAL CARE FIRST HOUR: CPT

## 2024-01-29 PROCEDURE — 99232 SBSQ HOSP IP/OBS MODERATE 35: CPT | Mod: FS

## 2024-01-29 PROCEDURE — 71045 X-RAY EXAM CHEST 1 VIEW: CPT | Mod: 26

## 2024-01-29 RX ORDER — ACETAMINOPHEN 500 MG
1000 TABLET ORAL ONCE
Refills: 0 | Status: COMPLETED | OUTPATIENT
Start: 2024-01-29 | End: 2024-01-29

## 2024-01-29 RX ADMIN — Medication 1 DROP(S): at 23:18

## 2024-01-29 RX ADMIN — Medication 1 GRAM(S): at 18:34

## 2024-01-29 RX ADMIN — Medication 1 DROP(S): at 05:03

## 2024-01-29 RX ADMIN — PANTOPRAZOLE SODIUM 40 MILLIGRAM(S): 20 TABLET, DELAYED RELEASE ORAL at 05:02

## 2024-01-29 RX ADMIN — HEPARIN SODIUM 5000 UNIT(S): 5000 INJECTION INTRAVENOUS; SUBCUTANEOUS at 14:39

## 2024-01-29 RX ADMIN — ESCITALOPRAM OXALATE 10 MILLIGRAM(S): 10 TABLET, FILM COATED ORAL at 12:28

## 2024-01-29 RX ADMIN — Medication 1000 MILLIGRAM(S): at 06:01

## 2024-01-29 RX ADMIN — Medication 1 APPLICATION(S): at 21:43

## 2024-01-29 RX ADMIN — SODIUM CHLORIDE 4 MILLILITER(S): 9 INJECTION INTRAMUSCULAR; INTRAVENOUS; SUBCUTANEOUS at 10:59

## 2024-01-29 RX ADMIN — HUMAN INSULIN 6 UNIT(S): 100 INJECTION, SUSPENSION SUBCUTANEOUS at 18:52

## 2024-01-29 RX ADMIN — Medication 1 DROP(S): at 00:00

## 2024-01-29 RX ADMIN — HEPARIN SODIUM 5000 UNIT(S): 5000 INJECTION INTRAVENOUS; SUBCUTANEOUS at 05:02

## 2024-01-29 RX ADMIN — HUMAN INSULIN 6 UNIT(S): 100 INJECTION, SUSPENSION SUBCUTANEOUS at 12:28

## 2024-01-29 RX ADMIN — Medication 1: at 18:53

## 2024-01-29 RX ADMIN — MEROPENEM 100 MILLIGRAM(S): 1 INJECTION INTRAVENOUS at 15:17

## 2024-01-29 RX ADMIN — Medication 3 MILLILITER(S): at 15:25

## 2024-01-29 RX ADMIN — HUMAN INSULIN 6 UNIT(S): 100 INJECTION, SUSPENSION SUBCUTANEOUS at 05:11

## 2024-01-29 RX ADMIN — Medication 3 MILLILITER(S): at 21:43

## 2024-01-29 RX ADMIN — Medication 81 MILLIGRAM(S): at 12:28

## 2024-01-29 RX ADMIN — Medication 3 MILLILITER(S): at 10:59

## 2024-01-29 RX ADMIN — Medication 400 MILLIGRAM(S): at 05:03

## 2024-01-29 RX ADMIN — MEROPENEM 100 MILLIGRAM(S): 1 INJECTION INTRAVENOUS at 03:15

## 2024-01-29 RX ADMIN — Medication 1 DROP(S): at 17:53

## 2024-01-29 RX ADMIN — SODIUM CHLORIDE 4 MILLILITER(S): 9 INJECTION INTRAMUSCULAR; INTRAVENOUS; SUBCUTANEOUS at 03:33

## 2024-01-29 RX ADMIN — CHLORHEXIDINE GLUCONATE 1 APPLICATION(S): 213 SOLUTION TOPICAL at 12:24

## 2024-01-29 RX ADMIN — Medication 1 GRAM(S): at 05:02

## 2024-01-29 RX ADMIN — LIDOCAINE 1 PATCH: 4 CREAM TOPICAL at 19:51

## 2024-01-29 RX ADMIN — Medication 1: at 05:12

## 2024-01-29 RX ADMIN — LIDOCAINE 1 PATCH: 4 CREAM TOPICAL at 07:55

## 2024-01-29 RX ADMIN — CHLORHEXIDINE GLUCONATE 15 MILLILITER(S): 213 SOLUTION TOPICAL at 05:02

## 2024-01-29 RX ADMIN — Medication 2: at 12:27

## 2024-01-29 RX ADMIN — HEPARIN SODIUM 5000 UNIT(S): 5000 INJECTION INTRAVENOUS; SUBCUTANEOUS at 21:43

## 2024-01-29 RX ADMIN — LEVETIRACETAM 400 MILLIGRAM(S): 250 TABLET, FILM COATED ORAL at 05:02

## 2024-01-29 RX ADMIN — HUMAN INSULIN 6 UNIT(S): 100 INJECTION, SUSPENSION SUBCUTANEOUS at 23:22

## 2024-01-29 RX ADMIN — Medication 3 MILLILITER(S): at 03:31

## 2024-01-29 RX ADMIN — Medication 1 DROP(S): at 12:26

## 2024-01-29 RX ADMIN — DEXMEDETOMIDINE HYDROCHLORIDE IN 0.9% SODIUM CHLORIDE 3.97 MICROGRAM(S)/KG/HR: 4 INJECTION INTRAVENOUS at 07:51

## 2024-01-29 RX ADMIN — CHLORHEXIDINE GLUCONATE 15 MILLILITER(S): 213 SOLUTION TOPICAL at 17:53

## 2024-01-29 RX ADMIN — PANTOPRAZOLE SODIUM 40 MILLIGRAM(S): 20 TABLET, DELAYED RELEASE ORAL at 18:34

## 2024-01-29 RX ADMIN — LEVETIRACETAM 400 MILLIGRAM(S): 250 TABLET, FILM COATED ORAL at 18:34

## 2024-01-29 RX ADMIN — SODIUM CHLORIDE 4 MILLILITER(S): 9 INJECTION INTRAMUSCULAR; INTRAVENOUS; SUBCUTANEOUS at 15:25

## 2024-01-29 RX ADMIN — LIDOCAINE 1 PATCH: 4 CREAM TOPICAL at 08:46

## 2024-01-29 RX ADMIN — DEXMEDETOMIDINE HYDROCHLORIDE IN 0.9% SODIUM CHLORIDE 3.97 MICROGRAM(S)/KG/HR: 4 INJECTION INTRAVENOUS at 19:50

## 2024-01-29 RX ADMIN — SODIUM CHLORIDE 4 MILLILITER(S): 9 INJECTION INTRAMUSCULAR; INTRAVENOUS; SUBCUTANEOUS at 21:44

## 2024-01-29 NOTE — PROGRESS NOTE ADULT - ASSESSMENT
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 Patient states that he has been having a dry cough for the past week, worsened over the past few days. Denies SOB with the cough, denies hemoptysis. Reports fevers at home to 101.5 with associated diaphoresis. Said that he has significant pinprick like b/l chest pain with his cough but denies any chest pain when not coughing or with ambulation. Also reports rhinorrhea and sore throat. Reports generalized weakness and poor appetite. States his daughter was visiting him over the week end last week and she was positive for COVID19. Patient tested positive for COVID19 2 days prior and was started on paxlovid as outpatient. Of note, family states that the patient has had worsening confusion at home over the past 6 months (becoming disoriented to time) but recently has been more confused, talking about seeing people that are not present.   On arrival to the ED his vitals were T 98 -> 99.2, P 80, /72, RR 18, ) sat 100% RA. His lab work showed leukocytosis with neutrophilia and bandemia, MABLE, mild hyponatremia, indeterminant but stable troponins, and elevated lactate to 2.3. His COVID19 swab was positive. His CXR showed bibasilar crackles.  (23 Dec 2023 22:51):  pt has been here for past two weeks and now pulm called fro excessive secretions   he is able to answer simple questions:  grand sons at bedside:     Covid / Resp failure/on 2 L of oxygen  :  CADS/P CABG  DM  CKD  HTN  CVA X 3    Acute Cholecystitis   -New:  s/p perc asa tune:   -on antibtiocs:   -on no vasopressors:    -id following   1/29 : cont antbiotics  Covid / Resp failure/on 2 L of oxygen:  -he was treated for covid before:   -he has excessive secretions  -keep o2 sao2 above 90%  -add BD and mucomyst: ;  -add mucinex:   1/10: he seems to be doing  ok: cont mucomyst and bd   1/11 ?: add benzonatate and Robitussin prm : dc dornase  1/12: seems OK: no sob:  no cough : no phlegm : has gurgling sound in throat:  looks comfortable  1/14: he is on room air:  with good sao2:  finished covid rx:  cont current rx:  high risk for aspiration as he has gurgling secretions in throat   1/15: would do ct chest he seems to have increasing effusions:  his ct scan from last week of december:  has not much of effusions: however will try lasix : [facility]    1/16: doing  ok : no os:b has secretions still : change to percussion bed:  cont bd  1/17: seems stable:  no sob: on 3 L of oxygen : should add ATC lasix to me as he has formed new pleural effusions:  echo with mod AS   1/18; dw pts son : who is a neurologist:  he has secretions in chest with atelectasis and yovani effusions with increased secretions:  the son requests bronchoscopy:  I have explained the advantages and disadvantages of the bronch at this age and he might not be able to be extubated soon after procedure:  but they want to go ahead with it: IP called   He will remain art risk for recurrent aspiration and atelectasis even after the procedure and they understand that    1/19: FOR BRONCH TODAY  : AGAIN CONFIRMED WITH NEUROLOGISTS SON  1/20 : events noted:  was successful extubated after the procedure:  but then he desaturated with increasing secretions and ended up on high flow and adm to MICU : on recent chest xray has mod large effusion on rigth side:  I think it needs a tap:  would defer to [facility] MICU team   ; [facility] cultures are still pending  1/21: dw fellow  consider tapping on rigth saide:  his PCP and son at bedside:  he is not obviously sob but still on 100%  high flow   1/22: he is doing poorly:  WBC increased:  bronch culturs with staph : nares with staph ? add vanco:  defer to MICU : in addition:  he has large effusion 0on rigth side: ? chest tube:  but brillinta needs to be stopped : [facility] attending  1/23: got intubated : sedated on vasopressors:  s/p brocnh : [facility] attending again  : possibly needs tap on rigth side   1/24: cont current rx:  defer to primary team  : s/p bronch   -cont current medications   1/28: seems same to me  : intubated and is on precedex:  cont current pulm care:  on antibiotics  1/29: on cpap trials today : for possibel extubation;  son at bedside; cont antibtiocs  DM  monitor and control   CKD  -cont current meds   HTN   -controlled  1/28:   -not on vasopressors:  antihypertensives being held  1/29: remains hemodynamically stable   CVA X 3  -doing ok :     [facility] family  and team  1/28: currently intubated   1/29: on cpap trials for possible extubation   GI Bleed:   -secondary to Dieulafoy's lesion:  defer to primary team :    dvt prophylaxis:    [facility] son at bedside

## 2024-01-29 NOTE — PROGRESS NOTE ADULT - ASSESSMENT
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     PLAN  =====Neurologic=====  #CVA  - prior CVA x3 with no residual deficits  #myoclonic jerks  - on Keppra will continue  - holding home  gabapentin and memantine in setting of somnolence  - continue lexapro  - wean precedex as tolerated       =====Pulmonary=====  #COVID  - s/p paxlovid and 1 dose remdesivir while inpatient  #Pleural effusions b/l  - CT chest from 1/16 showing new small bilateral pleural effusions/ atelectasis/ patchy bilateral ground-glass opacities are consistent with pulmonary edema.  - currently on zosyn for aspiration PNA from 1/20- 1/28   - s/p bronch for increasing secretions- on duonebs and HTS currently, intermittent IPV BID   - bronchial cultures with staph aureus, continue zosyn   - intubated on 1/22 for airway support, currently off sedatives attempt to extubate   - on solumedrol 40mg since 1/22- weaning to 30mg on 1/26 for 2 days and subsequently to 20mg x2days, 10mg x2 days -> will hold for now (1/27)   - POCUS with resolution of b/l pleural effusions, still holding brilinta until next week possible reaccumulation requiring pleural tap     =====Cardiovascular=====  Patient does not currently require vasopressors and is normotensive  #HTN  - on home amlodipine and metoprolol currently holding     #CAD  - on Brilinta (holding) aspirin and ranolazine continue   - as per vascular okay to hold Brilinta for possible pleural effusion thoracentesis  -have not heard back from cardiology regarding Brilinta / last stent 2022 , will hold     =====GI=====  #upper GI bleed  - s/p EGD showing dieulafoy lesion and esophagitis likely 2/2 NGT irritation s/p 2 clips  - on protonix IV BID continue   - CT on 1/25 with cholelithiasis and sludge HIDA on 1/26 confirming likely acute choley, plan for IR perc cholecystostomy 1/26     #Diet  - tube feeds NGT     =====Renal/=====  #Electrolytes  - Maintain K>4, Phos>3, Mag>2, iCal>1  - baseline cr 1.5, at baseline      =====Endocrine=====  #DM2  - on NPH 16 units q6 and SSI given solumedrol   - a1c 9.3, glucose wnl inpatient   - hypoglycemic overnight 1/26  started on d10 drips and insulin DC'd  - CTM w/ FSGs, will consider restarting insulin at lower dose  -NPH increasaed to 6u 1/28 due to hyperglycemia      =====Infectious Disease=====  #COVID   - s/p paxlovid and 1 dose remdesivir  # PNA  - CT chest showing ground glass opacities  - continue zosyn  - intermittent episodes of fevers, likely source GI given choleycystitis? culture NGTD  - ID consult 1/24-  CT maxillofacial CT head wnl  - CT chest with evolving GGO since 1/16    =====Heme/Onc=====  #DVT Ppx  - heparin sub-q    =====Ethics=====  FULL CODE.

## 2024-01-29 NOTE — PROGRESS NOTE ADULT - SUBJECTIVE AND OBJECTIVE BOX
Follow Up:    Fever    Interval History/ROS:  S/p Perc asa (1/26) Febrile to 100.7 ON. Patient was seen and examined at bedside. Intubated. ROS unable to assess.    Allergies  fluoroquinolone antibiotics (Other)  Cipro (Unknown)  Tegretol (Unknown)  carbamazepine (Other)        ANTIMICROBIALS:  meropenem  IVPB 1000 every 12 hours      OTHER MEDS:  MEDICATIONS  (STANDING):  albuterol/ipratropium for Nebulization 3 every 6 hours  aspirin  chewable 81 daily  dexMEDEtomidine Infusion 0.2 <Continuous>  dextrose 50% Injectable 25 once  dextrose 50% Injectable 25 once  dextrose 50% Injectable 12.5 once  dextrose Oral Gel 15 once  escitalopram 10 daily  glucagon  Injectable 1 once  heparin   Injectable 5000 every 8 hours  insulin lispro (ADMELOG) corrective regimen sliding scale  every 6 hours  insulin NPH human recombinant 6 every 6 hours  levETIRAcetam  IVPB 250 every 12 hours  pantoprazole  Injectable 40 every 12 hours  sodium chloride 3%  Inhalation 4 every 6 hours  sucralfate suspension 1 two times a day      Vital Signs Last 24 Hrs  T(C): 37.8 (29 Jan 2024 12:00), Max: 38.2 (29 Jan 2024 04:00)  T(F): 100 (29 Jan 2024 12:00), Max: 100.7 (29 Jan 2024 04:00)  HR: 97 (29 Jan 2024 16:00) (78 - 113)  BP: 127/49 (29 Jan 2024 16:00) (101/63 - 139/60)  BP(mean): 68 (29 Jan 2024 16:00) (64 - 95)  RR: 20 (29 Jan 2024 16:00) (14 - 25)  SpO2: 100% (29 Jan 2024 16:00) (96% - 100%)    Parameters below as of 29 Jan 2024 16:00  Patient On (Oxygen Delivery Method): ventilator, CPAP 10/6    O2 Concentration (%): 50      PHYSICAL EXAM:  Constitutional: intubated  HEAD/EYES: L eye w/ subconjunctival hemorrhage  ENT:  +ETT  Cardiovascular:   normal S1, S2, no murmur, RRR  Respiratory:  mechanical breath sounds  GI:  soft, nondistended  Skin:  No phlebitis; LUE hematoma w/ warmth                                             7.5    12.96 )-----------( 281      ( 29 Jan 2024 00:20 )             24.5       01-29    147<H>  |  109<H>  |  36<H>  ----------------------------<  207<H>  3.9   |  29  |  1.50<H>    Ca    7.7<L>      29 Jan 2024 00:20  Phos  2.6     01-29  Mg     2.20     01-29    TPro  5.8<L>  /  Alb  2.3<L>  /  TBili  0.2  /  DBili  x   /  AST  13  /  ALT  10  /  AlkPhos  112  01-29      Urinalysis Basic - ( 29 Jan 2024 00:20 )    Color: x / Appearance: x / SG: x / pH: x  Gluc: 207 mg/dL / Ketone: x  / Bili: x / Urobili: x   Blood: x / Protein: x / Nitrite: x   Leuk Esterase: x / RBC: x / WBC x   Sq Epi: x / Non Sq Epi: x / Bacteria: x        MICROBIOLOGY:  v  .Body Fluid gallbladder  01-26-24   No growth to date.  --    No polymorphonuclear leukocytes per low power field  No organisms seen per oil power field      .Blood Blood  01-24-24   No growth at 4 days  --  --      .Bronchial Bronchial  01-22-24   Normal Respiratory Aurelia present  --    Moderate polymorphonuclear leukocytes seen per low power field  No squamous epithelial cells per low power field  No organisms seen per oil power field      .Blood Blood-Peripheral  01-20-24   No growth at 5 days  --  --      .Blood Blood-Peripheral  01-20-24   No growth at 5 days  --  --      .Bronchial R MIDDLE LOBE  01-19-24   Numerous Staphylococcus aureus  Normal Respiratory Aurelia present  --  Staphylococcus aureus          Rapid RVP Result: NotDetec (01-24 @ 22:38)        RADIOLOGY:  Imaging below independently reviewed.  < from: US Abdomen Limited (01.26.24 @ 16:20) >    IMPRESSION:    Technically limited exam secondary to patient condition/intubated.    Moderately distended gallbladder with sludge. No wall thickening. If   there is further clinical concern for acute cholecystitis, a HIDA scan is   suggested for further evaluation.    < end of copied text >

## 2024-01-29 NOTE — PROGRESS NOTE ADULT - SUBJECTIVE AND OBJECTIVE BOX
89y Male s/p percutaneous cholecystostomy tube placement on 1/26 in Interventional Radiology.     Patient seen and examined at bedside, awake and intubated.   Family member at bedside, offers no complaints.     T(F): 99.3 (01-29-24 @ 08:00), Max: 100.7 (01-29-24 @ 04:00)  HR: 94 (01-29-24 @ 11:00) (67 - 104)  BP: 101/63 (01-29-24 @ 10:00) (101/63 - 132/54)  RR: 20 (01-29-24 @ 10:00) (13 - 25)  SpO2: 100% (01-29-24 @ 11:00) (97% - 100%)    LABS:                        7.5    12.96 )-----------( 281      ( 29 Jan 2024 00:20 )             24.5     01-29    147<H>  |  109<H>  |  36<H>  ----------------------------<  207<H>  3.9   |  29  |  1.50<H>    Ca    7.7<L>      29 Jan 2024 00:20  Phos  2.6     01-29  Mg     2.20     01-29    TPro  5.8<L>  /  Alb  2.3<L>  /  TBili  0.2  /  DBili  x   /  AST  13  /  ALT  10  /  AlkPhos  112  01-29    PT/INR - ( 29 Jan 2024 00:20 )   PT: 12.7 sec;   INR: 1.14 ratio         PTT - ( 29 Jan 2024 00:20 )  PTT:30.5 sec  I&O's Detail    28 Jan 2024 07:01  -  29 Jan 2024 07:00  --------------------------------------------------------  IN:    Dexmedetomidine: 653.4 mL    Glucerna 1.5: 880 mL    IV PiggyBack: 250 mL  Total IN: 1783.4 mL    OUT:    Drain (mL): 40 mL    Incontinent per Retracted Penis Pouch (mL): 775 mL    Rectal Tube (mL): 100 mL  Total OUT: 915 mL    Total NET: 868.4 mL      29 Jan 2024 07:01  -  29 Jan 2024 12:04  --------------------------------------------------------  IN:    Dexmedetomidine: 118.8 mL    Glucerna 1.5: 160 mL  Total IN: 278.8 mL    OUT:    Incontinent per Retracted Penis Pouch (mL): 100 mL  Total OUT: 100 mL    Total NET: 178.8 mL    PHYSICAL EXAM:  General: Nontoxic, in NAD  Cholecystostomy Tube: dressing c/d/i, bag with bilious output, flushed with 5cc NS

## 2024-01-29 NOTE — PROGRESS NOTE ADULT - SUBJECTIVE AND OBJECTIVE BOX
Date of Service: 24 @ 09:22    Patient is a 89y old  Male who presents with a chief complaint of COVID19, Chest pain (2024 11:05)      Any change in ROS: intubated on precedex:  neurologist son at bedside:   he is on cpap trials:      MEDICATIONS  (STANDING):  albuterol/ipratropium for Nebulization 3 milliLiter(s) Nebulizer every 6 hours  artificial  tears Solution 1 Drop(s) Left EYE four times a day  aspirin  chewable 81 milliGRAM(s) Enteral Tube daily  chlorhexidine 0.12% Liquid 15 milliLiter(s) Oral Mucosa every 12 hours  chlorhexidine 2% Cloths 1 Application(s) Topical daily  dexMEDEtomidine Infusion 0.2 MICROgram(s)/kG/Hr (3.97 mL/Hr) IV Continuous <Continuous>  dextrose 50% Injectable 25 Gram(s) IV Push once  dextrose 50% Injectable 12.5 Gram(s) IV Push once  dextrose 50% Injectable 25 Gram(s) IV Push once  dextrose Oral Gel 15 Gram(s) Oral once  escitalopram 10 milliGRAM(s) Oral daily  glucagon  Injectable 1 milliGRAM(s) IntraMuscular once  heparin   Injectable 5000 Unit(s) SubCutaneous every 8 hours  insulin lispro (ADMELOG) corrective regimen sliding scale   SubCutaneous every 6 hours  insulin NPH human recombinant 6 Unit(s) SubCutaneous every 6 hours  levETIRAcetam  IVPB 250 milliGRAM(s) IV Intermittent every 12 hours  lidocaine   4% Patch 1 Patch Transdermal every 24 hours  meropenem  IVPB 1000 milliGRAM(s) IV Intermittent every 12 hours  pantoprazole  Injectable 40 milliGRAM(s) IV Push every 12 hours  petrolatum Ophthalmic Ointment 1 Application(s) Left EYE at bedtime  polyethylene glycol 3350 17 Gram(s) Oral daily  senna Syrup 10 milliLiter(s) Oral at bedtime  sodium chloride 3%  Inhalation 4 milliLiter(s) Inhalation every 6 hours  sucralfate suspension 1 Gram(s) Enteral Tube two times a day    MEDICATIONS  (PRN):    Vital Signs Last 24 Hrs  T(C): 37.4 (2024 08:00), Max: 38.2 (2024 04:00)  T(F): 99.3 (2024 08:00), Max: 100.7 (2024 04:00)  HR: 96 (2024 08:00) (67 - 104)  BP: 127/47 (2024 08:00) (114/48 - 132/54)  BP(mean): 64 (2024 08:00) (64 - 75)  RR: 19 (2024 08:00) (13 - 29)  SpO2: 99% (2024 08:00) (97% - 100%)    Parameters below as of 2024 08:00  Patient On (Oxygen Delivery Method): ventilator, CPAP 10/6    O2 Concentration (%): 50  Mode: CPAP with PS  FiO2: 50  PEEP: 6  PS: 10  MAP: 10  PC: 10  PIP: 16    I&O's Summary    2024 07:01  -  2024 07:00  --------------------------------------------------------  IN: 1783.4 mL / OUT: 915 mL / NET: 868.4 mL          Physical Exam:   GENERAL: NAD, well-groomed, well-developed  HEENT: JAVAD/   Atraumatic, Normocephalic  ENMT: No tonsillar erythema, exudates, or enlargement; Moist mucous membranes, Good dentition, No lesions  NECK: Supple, No JVD, Normal thyroid  CHEST/LUNG: Clear to auscultaion-  CVS: Regular rate and rhythm; No murmurs, rubs, or gallops  GI: : Soft, Nontender, Nondistended; Bowel sounds present  NERVOUS SYSTEM:  on  Precedex   on cpap trials   EXTREMITIES: -edema  LYMPH: No lymphadenopathy noted  SKIN: No rashes or lesions  ENDOCRINOLOGY: No Thyromegaly  PSYCH: calm     Labs:  32, 30, 30, 33, 32                            7.5    12.96 )-----------( 281      ( 2024 00:20 )             24.5                         8.0    11.18 )-----------( 279      ( 2024 01:15 )             26.0                         7.6    9.03  )-----------( 232      ( 2024 01:05 )             23.8                         7.6    10.08 )-----------( 226      ( 2024 01:00 )             23.8     -    147<H>  |  109<H>  |  36<H>  ----------------------------<  207<H>  3.9   |  29  |  1.50<H>      142  |  104  |  39<H>  ----------------------------<  324<H>  4.0   |  26  |  1.57<H>      142  |  102  |  37<H>  ----------------------------<  285<H>  3.7   |  29  |  1.57<H>      146<H>  |  105  |  42<H>  ----------------------------<  132<H>  3.3<L>   |  31  |  1.59<H>    Ca    7.7<L>      2024 00:20  Ca    8.2<L>      2024 01:15  Phos  2.6       Phos  3.5       Mg     2.20       Mg     2.30         TPro  5.8<L>  /  Alb  2.3<L>  /  TBili  0.2  /  DBili  x   /  AST  13  /  ALT  10  /  AlkPhos  112    TPro  6.5  /  Alb  2.6<L>  /  TBili  0.3  /  DBili  x   /  AST  11  /  ALT  12  /  AlkPhos  89    TPro  6.2  /  Alb  2.4<L>  /  TBili  0.4  /  DBili  x   /  AST  14  /  ALT  11  /  AlkPhos  57    TPro  6.1  /  Alb  2.4<L>  /  TBili  0.3  /  DBili  x   /  AST  17  /  ALT  12  /  AlkPhos  75      CAPILLARY BLOOD GLUCOSE      POCT Blood Glucose.: 186 mg/dL (2024 05:07)  POCT Blood Glucose.: 233 mg/dL (2024 23:02)  POCT Blood Glucose.: 228 mg/dL (2024 17:30)  POCT Blood Glucose.: 258 mg/dL (2024 11:50)      LIVER FUNCTIONS - ( 2024 00:20 )  Alb: 2.3 g/dL / Pro: 5.8 g/dL / ALK PHOS: 112 U/L / ALT: 10 U/L / AST: 13 U/L / GGT: x           PT/INR - ( 2024 00:20 )   PT: 12.7 sec;   INR: 1.14 ratio         PTT - ( 2024 00:20 )  PTT:30.5 sec  Urinalysis Basic - ( 2024 00:20 )    Color: x / Appearance: x / SG: x / pH: x  Gluc: 207 mg/dL / Ketone: x  / Bili: x / Urobili: x   Blood: x / Protein: x / Nitrite: x   Leuk Esterase: x / RBC: x / WBC x   Sq Epi: x / Non Sq Epi: x / Bacteria: x            RECENT CULTURES:   @ 17:25 .Body Fluid gallbladder       No polymorphonuclear leukocytes per low power field  No organisms seen per oil power field           No growth to date.     @ 17:32 .Blood Blood                No growth at 4 days     @ 15:05 .Bronchial Bronchial       Moderate polymorphonuclear leukocytes seen per low power field  No squamous epithelial cells per low power field  No organisms seen per oil power field           Normal Respiratory Aurelia present          RESPIRATORY CULTURES:      rad< from: US Abdomen Limited (24 @ 16:20) >    ACC: [id number] EXAM:  US ABDOMEN LIMITED   ORDERED BY: GIL LAO     PROCEDURE DATE:  2024          INTERPRETATION:  CLINICAL INFORMATION: Abnormal CT finding of the   gallbladder.    COMPARISON: CT abdomen/pelvis 2024.    TECHNIQUE:Limited sonography of the right upper quadrant.    FINDINGS:    Technically limited exam secondary to patient condition/intubated.    Common bile duct: Normal caliber, 4 mm.  Gallbladder: Moderately distended with sludge. No wall thickening. Unable   to assess in decubitus position or sonographic Mathew's sign given   patient condition.  Pancreas: Poorly visualized.    IMPRESSION:    Technically limited exam secondary to patient condition/intubated.    Moderately distended gallbladder with sludge. No wall thickening. If   there is further clinical concern for acute cholecystitis, a HIDA scan is   suggested for further evaluation.            --- End of Report ---            ANTHONY RAE MD; Attending Radiologist  This document has been electronically signed. 2024  4:38PM    < end of copied text >  · Procedure Name	Interventional Radiology  · Procedure Name	percutaneous cholecystostomy catheter insertion  · TIME OUT	Patient's first and last name, , procedure, and correct site confirmed prior to the start of procedure.  · Procedure Date/Time	2024 17:00  · Informed Consent	Benefits, risks, and possible complications of procedure explained to patient/caregiver who verbalized understanding and gave written consent.  · Procedure Performed By	Myself  · Specimen Obtained	Fluid sent for gram stain and culture  · Estimated Blood Loss	Minimal  · Complications	No complications  · Sedation	Provided by Anesthesia Department  · Local Anesthesia	1% Lidocaine  · Procedure Findings and Details	technically successful US and fluoroscopic guided transperitoneal cholecystostomy catheter insertion.  · Patient Condition/Disposition	Back to ICU  · Plan	-f/u cultures  -forward flush drain daily with 5cc NS  -care per primary team      Studies  Chest X-RAY  CT SCAN Chest   Venous Dopplers: LE:   CT Abdomen  Others

## 2024-01-29 NOTE — PROGRESS NOTE ADULT - SUBJECTIVE AND OBJECTIVE BOX
Aashish Boyer MD  Interventional Cardiology / Endovascular Specialist  [place] Office : [phone] [place] [address] [place] [address]  Tel:   [place] Office : [address] [facility] [place] [address]  Tel: [phone]    Pt is lying in bed intubated  	  MEDICATIONS:  aspirin  chewable 81 milliGRAM(s) Enteral Tube daily  heparin   Injectable 5000 Unit(s) SubCutaneous every 8 hours    meropenem  IVPB 1000 milliGRAM(s) IV Intermittent every 12 hours    albuterol/ipratropium for Nebulization 3 milliLiter(s) Nebulizer every 6 hours  sodium chloride 3%  Inhalation 4 milliLiter(s) Inhalation every 6 hours    dexMEDEtomidine Infusion 0.2 MICROgram(s)/kG/Hr IV Continuous <Continuous>  escitalopram 10 milliGRAM(s) Oral daily  levETIRAcetam  IVPB 250 milliGRAM(s) IV Intermittent every 12 hours    pantoprazole  Injectable 40 milliGRAM(s) IV Push every 12 hours  sucralfate suspension 1 Gram(s) Enteral Tube two times a day    dextrose 50% Injectable 25 Gram(s) IV Push once  dextrose 50% Injectable 25 Gram(s) IV Push once  dextrose 50% Injectable 12.5 Gram(s) IV Push once  dextrose Oral Gel 15 Gram(s) Oral once  glucagon  Injectable 1 milliGRAM(s) IntraMuscular once  insulin lispro (ADMELOG) corrective regimen sliding scale   SubCutaneous every 6 hours  insulin NPH human recombinant 6 Unit(s) SubCutaneous every 6 hours    artificial  tears Solution 1 Drop(s) Left EYE four times a day  chlorhexidine 0.12% Liquid 15 milliLiter(s) Oral Mucosa every 12 hours  chlorhexidine 2% Cloths 1 Application(s) Topical daily  lidocaine   4% Patch 1 Patch Transdermal every 24 hours  petrolatum Ophthalmic Ointment 1 Application(s) Left EYE at bedtime      PAST MEDICAL/SURGICAL HISTORY  PAST MEDICAL & SURGICAL HISTORY:  Hyperlipemia      Hypertension      Coronary Artery Disease      Diabetes Mellitus Type II      Stented Coronary Artery  total 5 stents, last stent 5/2019      Neuropathy      Myocardial infarction      Stroke  mild left facial numbness   no other residuals verbalized      Myoclonic jerking      Stage 3 chronic kidney disease      History of Cataract Extraction      Hx of CABG      H/O coronary angiogram      S/P coronary artery stent placement  1/6/09      S/P placement of cardiac pacemaker          SOCIAL HISTORY: Substance Use (street drugs): ( x ) never used  (  ) other:    FAMILY HISTORY:  No pertinent family history in first degree relatives      PHYSICAL EXAM:  T(C): 37.6 (01-29-24 @ 20:00), Max: 38.2 (01-29-24 @ 04:00)  HR: 112 (01-29-24 @ 21:00) (78 - 113)  BP: 137/71 (01-29-24 @ 21:00) (101/63 - 139/60)  RR: 18 (01-29-24 @ 21:00) (14 - 24)  SpO2: 100% (01-29-24 @ 21:00) (96% - 100%)  Wt(kg): --  I&O's Summary    28 Jan 2024 07:01  -  29 Jan 2024 07:00  --------------------------------------------------------  IN: 1783.4 mL / OUT: 915 mL / NET: 868.4 mL    29 Jan 2024 07:01  -  29 Jan 2024 21:25  --------------------------------------------------------  IN: 748.8 mL / OUT: 950 mL / NET: -201.2 mL      GENERAL: intubated  EYES:   PERRLA   ENMT:   Moist mucous membranes, Good dentition, No lesions  Cardiovascular: Normal S1 S2, No JVD, No murmurs, No edema  Respiratory: b/l rhonchi   Gastrointestinal:  Soft, Non-tender, + BS	  Extremities: no edema                          7.5    12.96 )-----------( 281      ( 29 Jan 2024 00:20 )             24.5     01-29    147<H>  |  109<H>  |  36<H>  ----------------------------<  207<H>  3.9   |  29  |  1.50<H>    Ca    7.7<L>      29 Jan 2024 00:20  Phos  2.6     01-29  Mg     2.20     01-29    TPro  5.8<L>  /  Alb  2.3<L>  /  TBili  0.2  /  DBili  x   /  AST  13  /  ALT  10  /  AlkPhos  112  01-29    proBNP:   Lipid Profile:   HgA1c:   TSH:     Consultant(s) Notes Reviewed:  [x ] YES  [ ] NO    Care Discussed with Consultants/Other Providers [ x] YES  [ ] NO    Imaging Personally Reviewed independently:  [x] YES  [ ] NO    All labs, radiologic studies, vitals, orders and medications list reviewed. Patient is seen and examined at bedside. Case discussed with medical team.

## 2024-01-29 NOTE — PROGRESS NOTE ADULT - SUBJECTIVE AND OBJECTIVE BOX
Interval Events: GI re consulted for peg  last seen  for ugib, sp EGD at that time with findings of Dieulafoy lesion s/p clipping and NGT trauma s/p clipping    interval course- transferred out of sicu to floors 16, now in micu for acute respiratory failure, worsened s/p bronchoscopy  w/ worsening hypoxemia and mental status requiring intubation (), additionally s/p perc asa  for cholecystitis    chart reviewed-  101 fever yesterday, 100.7 this AM, tachys to 113, normotensive, on vent  wbc 12k (uptrending), hgb 7.5 (recently flux 7-8, last prbc given 1/3)  na 147 bun/cr 36/1.5 (improving)   bld cxs ngtd  ctap - new and increased bilateral lung consolidations and patchy opacities; Similar distended gallbladder with cholelithiasis and pericholecystic fat stranding, which remains concerning for acute cholecystitis.     mbs recd puree and mod thick liquids  has been on ngt feeds  on asa, brillinta dcd , steroids dcd   on cpap trials today    pt seen and examined       Allergies:  fluoroquinolone antibiotics (Other)  Cipro (Unknown)  Tegretol (Unknown)  carbamazepine (Other)      Hospital Medications:  albuterol/ipratropium for Nebulization 3 milliLiter(s) Nebulizer every 6 hours  artificial  tears Solution 1 Drop(s) Left EYE four times a day  aspirin  chewable 81 milliGRAM(s) Enteral Tube daily  chlorhexidine 0.12% Liquid 15 milliLiter(s) Oral Mucosa every 12 hours  chlorhexidine 2% Cloths 1 Application(s) Topical daily  dexMEDEtomidine Infusion 0.2 MICROgram(s)/kG/Hr IV Continuous <Continuous>  dextrose 50% Injectable 25 Gram(s) IV Push once  dextrose 50% Injectable 25 Gram(s) IV Push once  dextrose 50% Injectable 12.5 Gram(s) IV Push once  dextrose Oral Gel 15 Gram(s) Oral once  escitalopram 10 milliGRAM(s) Oral daily  glucagon  Injectable 1 milliGRAM(s) IntraMuscular once  heparin   Injectable 5000 Unit(s) SubCutaneous every 8 hours  insulin lispro (ADMELOG) corrective regimen sliding scale   SubCutaneous every 6 hours  insulin NPH human recombinant 6 Unit(s) SubCutaneous every 6 hours  levETIRAcetam  IVPB 250 milliGRAM(s) IV Intermittent every 12 hours  lidocaine   4% Patch 1 Patch Transdermal every 24 hours  meropenem  IVPB 1000 milliGRAM(s) IV Intermittent every 12 hours  pantoprazole  Injectable 40 milliGRAM(s) IV Push every 12 hours  petrolatum Ophthalmic Ointment 1 Application(s) Left EYE at bedtime  polyethylene glycol 3350 17 Gram(s) Oral daily  senna Syrup 10 milliLiter(s) Oral at bedtime  sodium chloride 3%  Inhalation 4 milliLiter(s) Inhalation every 6 hours  sucralfate suspension 1 Gram(s) Enteral Tube two times a day      ROS: unable to obtain from pt    Vital Signs:  Vital Signs Last 24 Hrs  T(C): 37.8 (2024 12:00), Max: 38.2 (2024 04:00)  T(F): 100 (2024 12:00), Max: 100.7 (2024 04:00)  HR: 113 (2024 14:00) (84 - 113)  BP: 138/80 (2024 14:00) (101/63 - 139/60)  BP(mean): 95 (2024 14:00) (64 - 95)  RR: 20 (2024 14:00) (14 - 25)  SpO2: 96% (2024 14:00) (96% - 100%)    Parameters below as of 2024 14:00  Patient On (Oxygen Delivery Method): ventilator, CPAP 10/6    O2 Concentration (%): 50  Daily     Daily Weight in k.2 (2024 04:00)      24 @ 07:  -  24 @ 07:00  --------------------------------------------------------  IN: 1783.4 mL / OUT: 915 mL / NET: 868.4 mL    24 @ 07:01  -  24 @ 14:29  --------------------------------------------------------  IN: 558.8 mL / OUT: 450 mL / NET: 108.8 mL        LABS:                        7.5    12.96 )-----------( 281      ( 2024 00:20 )             24.5     Mean Cell Volume: 97.2 fL (- @ 00:20)        147<H>  |  109<H>  |  36<H>  ----------------------------<  207<H>  3.9   |  29  |  1.50<H>    Ca    7.7<L>      2024 00:20  Phos  2.6       Mg     2.20         TPro  5.8<L>  /  Alb  2.3<L>  /  TBili  0.2  /  DBili  x   /  AST  13  /  ALT  10  /  AlkPhos  112      LIVER FUNCTIONS - ( 2024 00:20 )  Alb: 2.3 g/dL / Pro: 5.8 g/dL / ALK PHOS: 112 U/L / ALT: 10 U/L / AST: 13 U/L / GGT: x           PT/INR - ( 2024 00:20 )   PT: 12.7 sec;   INR: 1.14 ratio         PTT - ( 2024 00:20 )  PTT:30.5 sec  Urinalysis Basic - ( 2024 00:20 )    Color: x / Appearance: x / SG: x / pH: x  Gluc: 207 mg/dL / Ketone: x  / Bili: x / Urobili: x   Blood: x / Protein: x / Nitrite: x   Leuk Esterase: x / RBC: x / WBC x   Sq Epi: x / Non Sq Epi: x / Bacteria: x                              7.5    12.96 )-----------( 281      ( 2024 00:20 )             24.5                         8.0    11.18 )-----------( 279      ( 2024 01:15 )             26.0                         7.6    9.03  )-----------( 232      ( 2024 01:05 )             23.8       PHYSICAL EXAM            Imaging:      ACC: [id number] EXAM:  CT ABDOMEN AND PELVIS IC   ORDERED BY: RASIA JO     PROCEDURE DATE:  2024          INTERPRETATION:  CLINICAL INFORMATION: Sepsis. Evaluate for infection.    COMPARISON: CT chest abdomen pelvis 2023.    CONTRAST/COMPLICATIONS:  IV Contrast: IV contrast documented in unlinked concurrent exam  Oral Contrast: NONE  Complications: None reported at time of study completion    PROCEDURE:  CT of the Abdomen and Pelvis was performed.  Sagittal and coronal reformats were performed.    FINDINGS:  LOWER CHEST: New and increased bilateral lung consolidations and patchy   opacities. Stable moderate right and small left pleural effusions.   Partially visualized cardiac leads. Coronary artery calcifications.   Sternotomy.    LIVER: Within normal limits.  BILE DUCTS: Normal caliber.  GALLBLADDER: Similar distended gallbladder with cholelithiasis and   pericholecystic fat stranding.  SPLEEN: Within normal limits.  PANCREAS: Within normal limits.  ADRENALS: Within normal limits.  KIDNEYS/URETERS: Mild bilateral renal atrophy. No hydronephrosis.    BLADDER: Within normal limits.  REPRODUCTIVE ORGANS: Prostate is enlarged.    BOWEL: Enteric tube terminates in stomach. Small hiatal hernia. No bowel   obstruction. Appendix is normal.  PERITONEUM: No ascites.  VESSELS: Atherosclerotic changes. Interval placement of the SMA stent.  RETROPERITONEUM/LYMPH NODES: No lymphadenopathy.  ABDOMINAL WALL: Small inguinal hernias, the left contains fat and the   right contains fat and a portion of the cecum. Mild anasarca.  BONES: Chronic fracture deformities of the right ribs. Degenerative   changes.    IMPRESSION:  New and increased bilateral lung consolidations and patchy opacities,   which may represent a combination of pulmonary edema, atelectasis and/or   pneumonia.  Similar moderate right and small left pleural effusions.  Similar distended gallbladder with cholelithiasis and pericholecystic fat   stranding, which remains concerning for acute cholecystitis.    --- End of Report ---       Interval Events: GI re consulted for peg  last seen  for ugib, sp EGD at that time with findings of Dieulafoy lesion s/p clipping and NGT trauma s/p clipping    interval course- transferred out of sicu to floors , now in micu for acute respiratory failure, s/p bronchoscopy and intubation (), additionally s/p perc asa  for cholecystitis    chart reviewed-  101 fever yesterday, 100.7 this AM, tachy to 113, normotensive, on vent  wbc 12k, hgb 7.5 (recently flux 7-8, last prbc given 1/3)  na 147 bun/cr 36/1.5 (improving)   bld cxs ngtd  ctap - new and increased bilateral lung consolidations and patchy opacities; Similar distended gallbladder with cholelithiasis and pericholecystic fat stranding, which remains concerning for acute cholecystitis.     mbs recd puree and mod thick liquids, however per son pt was ?aspirating  has been on ngt feeds  on asa, brillinta dcd , steroids dcd   on cpap trials today    pt seen and examined, sons at bedside  on cpap, no fevers on day shift per rn  opens eyes      Allergies:  fluoroquinolone antibiotics (Other)  Cipro (Unknown)  Tegretol (Unknown)  carbamazepine (Other)      Hospital Medications:  albuterol/ipratropium for Nebulization 3 milliLiter(s) Nebulizer every 6 hours  artificial  tears Solution 1 Drop(s) Left EYE four times a day  aspirin  chewable 81 milliGRAM(s) Enteral Tube daily  chlorhexidine 0.12% Liquid 15 milliLiter(s) Oral Mucosa every 12 hours  chlorhexidine 2% Cloths 1 Application(s) Topical daily  dexMEDEtomidine Infusion 0.2 MICROgram(s)/kG/Hr IV Continuous <Continuous>  dextrose 50% Injectable 25 Gram(s) IV Push once  dextrose 50% Injectable 25 Gram(s) IV Push once  dextrose 50% Injectable 12.5 Gram(s) IV Push once  dextrose Oral Gel 15 Gram(s) Oral once  escitalopram 10 milliGRAM(s) Oral daily  glucagon  Injectable 1 milliGRAM(s) IntraMuscular once  heparin   Injectable 5000 Unit(s) SubCutaneous every 8 hours  insulin lispro (ADMELOG) corrective regimen sliding scale   SubCutaneous every 6 hours  insulin NPH human recombinant 6 Unit(s) SubCutaneous every 6 hours  levETIRAcetam  IVPB 250 milliGRAM(s) IV Intermittent every 12 hours  lidocaine   4% Patch 1 Patch Transdermal every 24 hours  meropenem  IVPB 1000 milliGRAM(s) IV Intermittent every 12 hours  pantoprazole  Injectable 40 milliGRAM(s) IV Push every 12 hours  petrolatum Ophthalmic Ointment 1 Application(s) Left EYE at bedtime  polyethylene glycol 3350 17 Gram(s) Oral daily  senna Syrup 10 milliLiter(s) Oral at bedtime  sodium chloride 3%  Inhalation 4 milliLiter(s) Inhalation every 6 hours  sucralfate suspension 1 Gram(s) Enteral Tube two times a day      ROS: unable to obtain from pt    Vital Signs:  Vital Signs Last 24 Hrs  T(C): 37.8 (2024 12:00), Max: 38.2 (2024 04:00)  T(F): 100 (2024 12:00), Max: 100.7 (2024 04:00)  HR: 113 (2024 14:00) (84 - 113)  BP: 138/80 (2024 14:00) (101/63 - 139/60)  BP(mean): 95 (2024 14:00) (64 - 95)  RR: 20 (2024 14:00) (14 - 25)  SpO2: 96% (2024 14:00) (96% - 100%)    Parameters below as of 2024 14:00  Patient On (Oxygen Delivery Method): ventilator, CPAP 10/6    O2 Concentration (%): 50  Daily     Daily Weight in k.2 (2024 04:00)      24 @ 07:  -  24 @ 07:00  --------------------------------------------------------  IN: 1783.4 mL / OUT: 915 mL / NET: 868.4 mL    24 @ 07:24 @ 14:29  --------------------------------------------------------  IN: 558.8 mL / OUT: 450 mL / NET: 108.8 mL        LABS:                        7.5    12.96 )-----------( 281      ( 2024 00:20 )             24.5     Mean Cell Volume: 97.2 fL (24 @ 00:20)        147<H>  |  109<H>  |  36<H>  ----------------------------<  207<H>  3.9   |  29  |  1.50<H>    Ca    7.7<L>      2024 00:20  Phos  2.6       Mg     2.20         TPro  5.8<L>  /  Alb  2.3<L>  /  TBili  0.2  /  DBili  x   /  AST  13  /  ALT  10  /  AlkPhos  112      LIVER FUNCTIONS - ( 2024 00:20 )  Alb: 2.3 g/dL / Pro: 5.8 g/dL / ALK PHOS: 112 U/L / ALT: 10 U/L / AST: 13 U/L / GGT: x           PT/INR - ( 2024 00:20 )   PT: 12.7 sec;   INR: 1.14 ratio         PTT - ( 2024 00:20 )  PTT:30.5 sec  Urinalysis Basic - ( 2024 00:20 )    Color: x / Appearance: x / SG: x / pH: x  Gluc: 207 mg/dL / Ketone: x  / Bili: x / Urobili: x   Blood: x / Protein: x / Nitrite: x   Leuk Esterase: x / RBC: x / WBC x   Sq Epi: x / Non Sq Epi: x / Bacteria: x                              7.5    12.96 )-----------( 281      ( 2024 00:20 )             24.5                         8.0    11.18 )-----------( 279      ( 2024 01:15 )             26.0                         7.6    9.03  )-----------( 232      ( 2024 01:05 )             23.8       PHYSICAL EXAM  GENERAL: lying in bed, chronically ill appearing  HEENT: NCAT, +NGT  CHEST:  on vent  ABDOMEN:  soft, distended, appears non-tender, +scars to abd; rectal pouch w/ brown stool  EXTREMITIES: WWP  SKIN:  warm/dry  PSYCH: sedated  NEURO: no tremor noted           Imaging:      ACC: [id number] EXAM:  CT ABDOMEN AND PELVIS IC   ORDERED BY: RAISA JO     PROCEDURE DATE:  2024          INTERPRETATION:  CLINICAL INFORMATION: Sepsis. Evaluate for infection.    COMPARISON: CT chest abdomen pelvis 2023.    CONTRAST/COMPLICATIONS:  IV Contrast: IV contrast documented in unlinked concurrent exam  Oral Contrast: NONE  Complications: None reported at time of study completion    PROCEDURE:  CT of the Abdomen and Pelvis was performed.  Sagittal and coronal reformats were performed.    FINDINGS:  LOWER CHEST: New and increased bilateral lung consolidations and patchy   opacities. Stable moderate right and small left pleural effusions.   Partially visualized cardiac leads. Coronary artery calcifications.   Sternotomy.    LIVER: Within normal limits.  BILE DUCTS: Normal caliber.  GALLBLADDER: Similar distended gallbladder with cholelithiasis and   pericholecystic fat stranding.  SPLEEN: Within normal limits.  PANCREAS: Within normal limits.  ADRENALS: Within normal limits.  KIDNEYS/URETERS: Mild bilateral renal atrophy. No hydronephrosis.    BLADDER: Within normal limits.  REPRODUCTIVE ORGANS: Prostate is enlarged.    BOWEL: Enteric tube terminates in stomach. Small hiatal hernia. No bowel   obstruction. Appendix is normal.  PERITONEUM: No ascites.  VESSELS: Atherosclerotic changes. Interval placement of the SMA stent.  RETROPERITONEUM/LYMPH NODES: No lymphadenopathy.  ABDOMINAL WALL: Small inguinal hernias, the left contains fat and the   right contains fat and a portion of the cecum. Mild anasarca.  BONES: Chronic fracture deformities of the right ribs. Degenerative   changes.    IMPRESSION:  New and increased bilateral lung consolidations and patchy opacities,   which may represent a combination of pulmonary edema, atelectasis and/or   pneumonia.  Similar moderate right and small left pleural effusions.  Similar distended gallbladder with cholelithiasis and pericholecystic fat   stranding, which remains concerning for acute cholecystitis.    --- End of Report ---

## 2024-01-29 NOTE — PROGRESS NOTE ADULT - ASSESSMENT
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 Prolonged hospital course- s/p SMA angiography with stent placement with diagnostic laparoscopy 12/24 for  mesenteric fat stranding associated with ascending/transverse colon seen on imaging. 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 1/29 GI reconsulted for consideration for peg.     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 1/29 GI reconsulted for consideration for peg.    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 1/29 GI reconsulted for consideration for peg.    # acute hypoxemic respiratory failure/mssa/possible aspiration pneumonia/R pleural effusion  - on vent and abx, MICU planning for potential dx/tx R thora  # dysphagia   - last mbs 1/19 (pre intubation) recommending pureed/mod thick liquids, current indication for peg unclear as peg does not eliminate risk of aspiration, however per dw primary team, family requesting placement- risks of PEG placement, both related to sedation and placement were reviewed w/ sons at bedside in detail, including, but not limited to infection, bleeding, injury to adjacent organs (or misplacement), leakage, peritonitis if pulled prior to stoma tract healing, aspiration, lack of safe window which would preclude placement, etc; given current clinical status, unclear that PEG would improve QOL for patient, however currently within GOC of family as per discussions w/ sons  # intermittent fevers - 1/24 bld cxs ngtd  # acute cholecystitis, sp IR perc asa 1/26  # prior UGIB - resolved, sp EGD - 2/2 Dieulafoy lesion s/p clipping and NGT trauma s/p clipping  # s/p SMA angiography with stent placement with diagnostic laparoscopy 12/24, currently only on asa, brillinta dcd on 1/24  # hx of CAD s/p CABG s/p stents (last stent May 2022)/mild to mod AS on echo  # normocytic anemia  # hypernatremia    Recommendations-   - currently not optimized for any procedures given intermittent fevers  - ideally from GI perspective, if plan is for PEG would recommend placing post extubation when respiratory status optimized  - continue NGT feeds in interim  - trend cbc, transfuse for hgb >8   - vent management/rest of care as per MICU; GI to follow    dw gi attg, MICU  will further madison Whitley PA-C, RD, CDN  available on TEAMS for questions  after 5pm/weekends, please contact the on-call GI fellow at [phone]

## 2024-01-29 NOTE — PROGRESS NOTE ADULT - ASSESSMENT
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 s/p EGD showing dieulafoy lesion and esophagitis likely 2/2 NGT irritation s/p 2 clips. CT on 1/25 with cholelithiasis and sludge. HIDA prelim positive. Patient is now s/p percutaneous cholecystostomy tube placement on 1/26 in Interventional Radiology.     Plan:  - Continue drainage, monitor output  - Flush drain 5 cc NS qd  - If no surgical Intervention, will need routine 3 months exchanges. Please call IR office [phone]    [phone]

## 2024-01-29 NOTE — PROGRESS NOTE ADULT - SUBJECTIVE AND OBJECTIVE BOX
Patient seen and examined.   febrile this am    REVIEW OF SYSTEMS:  UTO intubated     MEDICATIONS  (STANDING):  albuterol/ipratropium for Nebulization 3 milliLiter(s) Nebulizer every 6 hours  artificial  tears Solution 1 Drop(s) Left EYE four times a day  aspirin  chewable 81 milliGRAM(s) Enteral Tube daily  chlorhexidine 0.12% Liquid 15 milliLiter(s) Oral Mucosa every 12 hours  chlorhexidine 2% Cloths 1 Application(s) Topical daily  dexMEDEtomidine Infusion 0.2 MICROgram(s)/kG/Hr (3.97 mL/Hr) IV Continuous <Continuous>  dextrose 50% Injectable 25 Gram(s) IV Push once  dextrose 50% Injectable 25 Gram(s) IV Push once  dextrose 50% Injectable 12.5 Gram(s) IV Push once  dextrose Oral Gel 15 Gram(s) Oral once  escitalopram 10 milliGRAM(s) Oral daily  glucagon  Injectable 1 milliGRAM(s) IntraMuscular once  heparin   Injectable 5000 Unit(s) SubCutaneous every 8 hours  insulin lispro (ADMELOG) corrective regimen sliding scale   SubCutaneous every 6 hours  insulin NPH human recombinant 3 Unit(s) SubCutaneous every 6 hours  levETIRAcetam  IVPB 250 milliGRAM(s) IV Intermittent every 12 hours  lidocaine   4% Patch 1 Patch Transdermal every 24 hours  pantoprazole  Injectable 40 milliGRAM(s) IV Push every 12 hours  petrolatum Ophthalmic Ointment 1 Application(s) Left EYE at bedtime  piperacillin/tazobactam IVPB.. 3.375 Gram(s) IV Intermittent every 8 hours  polyethylene glycol 3350 17 Gram(s) Oral daily  senna Syrup 10 milliLiter(s) Oral at bedtime  sodium chloride 3%  Inhalation 4 milliLiter(s) Inhalation every 6 hours  sucralfate suspension 1 Gram(s) Enteral Tube two times a day      VITAL:  T(C): , Max: 38.3 (01-28-24 @ 08:00)  T(F): , Max: 101 (01-28-24 @ 08:00)  HR: 86 (01-28-24 @ 10:00)  BP: 121/49 (01-28-24 @ 10:00)  BP(mean): 66 (01-28-24 @ 10:00)  RR: 21 (01-28-24 @ 10:00)  SpO2: 100% (01-28-24 @ 10:00)  Wt(kg): --    I and O's:    01-27 @ 07:01 - 01-28 @ 07:00  --------------------------------------------------------  IN: 2236.5 mL / OUT: 680 mL / NET: 1556.5 mL    01-28 @ 07:01 - 01-28 @ 10:20  --------------------------------------------------------  IN: 278.8 mL / OUT: 0 mL / NET: 278.8 mL          PHYSICAL EXAM:    Constitutional: on vent  HEENT:  intubated  Neck: No LAD, No JVD  Respiratory: diminished b/l  Cardiovascular: S1 and S2  Gastrointestinal: BS+, soft, NT/ND  Extremities: + l/e edema  Neurological: UTO  : + Moss  Skin: No rashes  Access: Not applicable      LABS:                        8.0    11.18 )-----------( 279      ( 28 Jan 2024 01:15 )             26.0     01-28    142  |  104  |  39<H>  ----------------------------<  324<H>  4.0   |  26  |  1.57<H>    Ca    8.2<L>      28 Jan 2024 01:15  Phos  3.5     01-28  Mg     2.30     01-28    TPro  6.5  /  Alb  2.6<L>  /  TBili  0.3  /  DBili  x   /  AST  11  /  ALT  12  /  AlkPhos  89  01-28      Urine Studies:  Urinalysis Basic - ( 28 Jan 2024 01:15 )    Color: x / Appearance: x / SG: x / pH: x  Gluc: 324 mg/dL / Ketone: x  / Bili: x / Urobili: x   Blood: x / Protein: x / Nitrite: x   Leuk Esterase: x / RBC: x / WBC x   Sq Epi: x / Non Sq Epi: x / Bacteria: x      Assessment and Plan:   · Assessment	  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  Renal fxn remains stable   Hypophosphatemia- stable  Hypokalemia - stable  Hypertensive urgency -resolved --- BP meds as ordered; BP acceptable   Pulm - resp failure - intubated  EEG pending   hypernatremia --improved/stable    1 Renal - crt stable, no objection to lasix 40mg IV once  PRN , in case of distress  give free water 150 cc every 8h  Trend Phos level daily   4 CV - BP controlled   5 Vasc - s/p SMA stent placement;   6 Sx - s/p HIDA + for acute asa, medical management, .tube feedings   7 GI - s/p EGD clipping of bleeding duodenal ulcers   8 Anemia-continue to trend H/H; suggest PRBC for Hgb <7.0; transfuse per primary team   9 Pul - intubated  [facility] ICU   10- ID follow up; patient noted to be febrile this am.  11 Glycemic control as able           Elsa Nunez  [facility]  Office: [phone]

## 2024-01-29 NOTE — PROGRESS NOTE ADULT - NS ATTEND AMEND GEN_ALL_CORE FT
Agree with above  Recommend evaluation for PEG placement when optimized from a respiratory standpoint and afebrile > 48 hours

## 2024-01-29 NOTE — PROGRESS NOTE ADULT - ATTENDING COMMENTS
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 Course c/b acute cholecystitis s/p perc asa 1/26 by IR    #Acute hypoxemic respiratory failure  #MSSA Pneumonia  #Dysphagia  #R pleural effusion  #Acute cholecystitis  - c/w mechanical ventilatory support for now. Tolerating PSV 10/5  - Family aggreable for PEG prior to extubation, will consult GI  - After PEG, will optimize respiratory status prior to extubation. Will plan for R diagnostic/therapeutic thora prior to extubation attempt  - c/w empiric Meropenem for now, f/u cultures  - Repeat CXR  - LUE to rule out DVT due to prior complaint of pain/edema  - Holding Brilinta given plan for thora (cardiac stents >1 year ago)    Neil Goode MD  Pulmonary & Critical Care

## 2024-01-29 NOTE — PROGRESS NOTE ADULT - ASSESSMENT
89-yo M w/ PMH of CAD s/p CABG w/ stents, s/p PPM, DM, PVD, CVA, CKD, and myoclonic jerks, admitted on 12/23 i/s/o recent COVID. Partially treated for COVID w/ Paxlovid. Course complicated by CT C/A/P revealing findings suggestive of acute cholecystitis (12/24/23). 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 S/p exlap, mesenteric angiogram, and SMA stent (12/24). HIDA (12/29) supported the diagnosis of acute cholecystitis. Treated medically (Zosyn 12/24-31). Further c/b acute blood loss anemia 1/1, s/p pRBC. EGD on 1/2 w/ clipping. LUE hematoma noted 1/10 on venous Duplex, w/ visualization of complex, avascular fluid collection (5.3 x 2.7 x 3.7 cm), possibly hematoma. AMS noted on 1/16, w/ CT head finding suggestive of partial opacification of the L middle ear and mastoid air cells. CT chest on 1/16 revealed new small b/l pleural effusions a/w RML and RLL collapse. Patchy b/l GGO, c/f pulm edema. S/p BAL 1/19, revealing NRF w/ Staph aureus. Restarted on Zosyn 1/20. Repeat BAL 1/22 w/ no growth. Perc asa on 1/26.      Workup:  Covid on 12/23  Blood Cx (12/24, 1/20, 1/22): NGTD  Bronch Cx (119): MSSA, moderate yeast   Legionella urine Ag neg     Spiking fevers on 1/19 and again 1/23. Febrile until 1/25.  Leukocytosis increased on 1/20 8-->16-->21-->16-->17. Improved to 10 (1/26).    Antimicrobials:   Remdesivir 12/25-26  Zosyn 12/25-31, 1/20-->28  Cefepime 1/19-20  David 1/28-->    #Cholecystitis  #Acute hypoxic respiratory failure  #Pleural effusion  #Fever  #Leukocytosis  #MABLE on CKD  #Abnormal CT of head  #Conjunctival hemorrhage  #Hematoma  #Toxic metabolic encephalitis  - AMS and acute hypoxic resp failure, intubated. BAL 1/19 w/ S aureus on Zosyn, now repeat BAL 1/22 w/ no growth  - Recent COVID. Superimposed infection can be within differential.  - CT max/face w/ no drainable abscess. CTAP and NM showed acute asa. S/p perc asa 1/26.  - F/u serum galactomannan and BCx. Fungitell neg.  - No objection continuing with meropenem at this time. F/u all cultures  - Trach/PEG planned.  - Monitor fever curve and WBC. Wean off oxygen per MICU management.    Plan discussed with MICU team.  Thank you for this consult. Inpatient ID team will follow.    Cheo Kelly MD, PhD  Attending Physician  Division of Infectious Diseases  Department of Medicine    Please contact through MS Teams message.  Office: [phone] (after 5 PM or weekend) .

## 2024-01-29 NOTE — PROGRESS NOTE ADULT - SUBJECTIVE AND OBJECTIVE BOX
INTERVAL HPI/OVERNIGHT EVENTS:    O/N:    SUBJECTIVE: Patient seen and examined at bedside.     CONSTITUTIONAL: No weakness, fevers or chills  EYES/ENT: No visual changes;  No vertigo or throat pain   NECK: No pain or stiffness  RESPIRATORY: No cough, wheezing, hemoptysis; No shortness of breath  CARDIOVASCULAR: No chest pain or palpitations  GASTROINTESTINAL: No abdominal or epigastric pain. No nausea, vomiting, or hematemesis; No diarrhea or constipation. No melena or hematochezia.  GENITOURINARY: No dysuria, frequency or hematuria  NEUROLOGICAL: No numbness or weakness  SKIN: No itching, rashes    OBJECTIVE:    VITAL SIGNS:  ICU Vital Signs Last 24 Hrs  T(C): 37.8 (29 Jan 2024 12:00), Max: 38.2 (29 Jan 2024 04:00)  T(F): 100 (29 Jan 2024 12:00), Max: 100.7 (29 Jan 2024 04:00)  HR: 113 (29 Jan 2024 14:00) (84 - 113)  BP: 138/80 (29 Jan 2024 14:00) (101/63 - 139/60)  BP(mean): 95 (29 Jan 2024 14:00) (64 - 95)  ABP: --  ABP(mean): --  RR: 20 (29 Jan 2024 14:00) (14 - 25)  SpO2: 96% (29 Jan 2024 14:00) (96% - 100%)    O2 Parameters below as of 29 Jan 2024 14:00  Patient On (Oxygen Delivery Method): ventilator, CPAP 10/6    O2 Concentration (%): 50      Mode: CPAP with PS, FiO2: 50, PEEP: 6, PS: 10, MAP: 10, PC: 10, PIP: 21    01-28 @ 07:01 - 01-29 @ 07:00  --------------------------------------------------------  IN: 1783.4 mL / OUT: 915 mL / NET: 868.4 mL    01-29 @ 07:01 - 01-29 @ 14:30  --------------------------------------------------------  IN: 558.8 mL / OUT: 450 mL / NET: 108.8 mL      CAPILLARY BLOOD GLUCOSE      POCT Blood Glucose.: 224 mg/dL (29 Jan 2024 12:25)      PHYSICAL EXAM:    General: NAD  HEENT: NC/AT; PERRL, clear conjunctiva  Neck: supple  Respiratory: CTA b/l  Cardiovascular: +S1/S2; RRR  Abdomen: soft, NT/ND; +BS x4  Extremities: WWP, 2+ peripheral pulses b/l; no LE edema  Skin: normal color and turgor; no rash  Neurological:    MEDICATIONS:  MEDICATIONS  (STANDING):  albuterol/ipratropium for Nebulization 3 milliLiter(s) Nebulizer every 6 hours  artificial  tears Solution 1 Drop(s) Left EYE four times a day  aspirin  chewable 81 milliGRAM(s) Enteral Tube daily  chlorhexidine 0.12% Liquid 15 milliLiter(s) Oral Mucosa every 12 hours  chlorhexidine 2% Cloths 1 Application(s) Topical daily  dexMEDEtomidine Infusion 0.2 MICROgram(s)/kG/Hr (3.97 mL/Hr) IV Continuous <Continuous>  dextrose 50% Injectable 25 Gram(s) IV Push once  dextrose 50% Injectable 25 Gram(s) IV Push once  dextrose 50% Injectable 12.5 Gram(s) IV Push once  dextrose Oral Gel 15 Gram(s) Oral once  escitalopram 10 milliGRAM(s) Oral daily  glucagon  Injectable 1 milliGRAM(s) IntraMuscular once  heparin   Injectable 5000 Unit(s) SubCutaneous every 8 hours  insulin lispro (ADMELOG) corrective regimen sliding scale   SubCutaneous every 6 hours  insulin NPH human recombinant 6 Unit(s) SubCutaneous every 6 hours  levETIRAcetam  IVPB 250 milliGRAM(s) IV Intermittent every 12 hours  lidocaine   4% Patch 1 Patch Transdermal every 24 hours  meropenem  IVPB 1000 milliGRAM(s) IV Intermittent every 12 hours  pantoprazole  Injectable 40 milliGRAM(s) IV Push every 12 hours  petrolatum Ophthalmic Ointment 1 Application(s) Left EYE at bedtime  polyethylene glycol 3350 17 Gram(s) Oral daily  senna Syrup 10 milliLiter(s) Oral at bedtime  sodium chloride 3%  Inhalation 4 milliLiter(s) Inhalation every 6 hours  sucralfate suspension 1 Gram(s) Enteral Tube two times a day    MEDICATIONS  (PRN):      ALLERGIES:  Allergies    fluoroquinolone antibiotics (Other)  Cipro (Unknown)  Tegretol (Unknown)  carbamazepine (Other)    Intolerances        LABS:                        7.5    12.96 )-----------( 281      ( 29 Jan 2024 00:20 )             24.5     01-29    147<H>  |  109<H>  |  36<H>  ----------------------------<  207<H>  3.9   |  29  |  1.50<H>    Ca    7.7<L>      29 Jan 2024 00:20  Phos  2.6     01-29  Mg     2.20     01-29    TPro  5.8<L>  /  Alb  2.3<L>  /  TBili  0.2  /  DBili  x   /  AST  13  /  ALT  10  /  AlkPhos  112  01-29    PT/INR - ( 29 Jan 2024 00:20 )   PT: 12.7 sec;   INR: 1.14 ratio         PTT - ( 29 Jan 2024 00:20 )  PTT:30.5 sec  Urinalysis Basic - ( 29 Jan 2024 00:20 )    Color: x / Appearance: x / SG: x / pH: x  Gluc: 207 mg/dL / Ketone: x  / Bili: x / Urobili: x   Blood: x / Protein: x / Nitrite: x   Leuk Esterase: x / RBC: x / WBC x   Sq Epi: x / Non Sq Epi: x / Bacteria: x        RADIOLOGY & ADDITIONAL TESTS: Reviewed. INTERVAL HPI/OVERNIGHT EVENTS:    O/N: T100.7 given tylenol     SUBJECTIVE: Patient seen and examined at bedside.     ROS unobtainable due to patient's current condition     OBJECTIVE:    VITAL SIGNS:  ICU Vital Signs Last 24 Hrs  T(C): 37.8 (29 Jan 2024 12:00), Max: 38.2 (29 Jan 2024 04:00)  T(F): 100 (29 Jan 2024 12:00), Max: 100.7 (29 Jan 2024 04:00)  HR: 113 (29 Jan 2024 14:00) (84 - 113)  BP: 138/80 (29 Jan 2024 14:00) (101/63 - 139/60)  BP(mean): 95 (29 Jan 2024 14:00) (64 - 95)  ABP: --  ABP(mean): --  RR: 20 (29 Jan 2024 14:00) (14 - 25)  SpO2: 96% (29 Jan 2024 14:00) (96% - 100%)    O2 Parameters below as of 29 Jan 2024 14:00  Patient On (Oxygen Delivery Method): ventilator, CPAP 10/6    O2 Concentration (%): 50      Mode: CPAP with PS, FiO2: 50, PEEP: 6, PS: 10, MAP: 10, PC: 10, PIP: 21    01-28 @ 07:01 - 01-29 @ 07:00  --------------------------------------------------------  IN: 1783.4 mL / OUT: 915 mL / NET: 868.4 mL    01-29 @ 07:01 - 01-29 @ 14:30  --------------------------------------------------------  IN: 558.8 mL / OUT: 450 mL / NET: 108.8 mL      CAPILLARY BLOOD GLUCOSE      POCT Blood Glucose.: 224 mg/dL (29 Jan 2024 12:25)      PHYSICAL EXAM:    GENERAL: NAD, lying in bed comfortably  HEAD:  Atraumatic, Normocephalic  EYES: EOMI, PERRLA, conjunctiva and sclera clear  ENT: Moist mucous membranes  NECK: Supple, No JVD  CHEST/LUNG: CTABL; No rales, rhonchi, wheezing, or rubs. Unlabored respirations  HEART: RRR. No M/R/G  ABDOMEN: Soft, nontender, non-distended, normoactive BS. No hepatomegaly  EXTREMITIES:  2+ Peripheral Pulses, brisk capillary refill. No clubbing, cyanosis, or edema  NERVOUS SYSTEM:  Alert & Oriented X3, speech clear. No deficits, CN II-XII intact. Normal sensation   MSK: FROM all 4 extremities, full and equal strength  PSYCH: Normal affect, normal speech, normal behavior  SKIN: No rashes or lesions    MEDICATIONS:  MEDICATIONS  (STANDING):  albuterol/ipratropium for Nebulization 3 milliLiter(s) Nebulizer every 6 hours  artificial  tears Solution 1 Drop(s) Left EYE four times a day  aspirin  chewable 81 milliGRAM(s) Enteral Tube daily  chlorhexidine 0.12% Liquid 15 milliLiter(s) Oral Mucosa every 12 hours  chlorhexidine 2% Cloths 1 Application(s) Topical daily  dexMEDEtomidine Infusion 0.2 MICROgram(s)/kG/Hr (3.97 mL/Hr) IV Continuous <Continuous>  dextrose 50% Injectable 25 Gram(s) IV Push once  dextrose 50% Injectable 25 Gram(s) IV Push once  dextrose 50% Injectable 12.5 Gram(s) IV Push once  dextrose Oral Gel 15 Gram(s) Oral once  escitalopram 10 milliGRAM(s) Oral daily  glucagon  Injectable 1 milliGRAM(s) IntraMuscular once  heparin   Injectable 5000 Unit(s) SubCutaneous every 8 hours  insulin lispro (ADMELOG) corrective regimen sliding scale   SubCutaneous every 6 hours  insulin NPH human recombinant 6 Unit(s) SubCutaneous every 6 hours  levETIRAcetam  IVPB 250 milliGRAM(s) IV Intermittent every 12 hours  lidocaine   4% Patch 1 Patch Transdermal every 24 hours  meropenem  IVPB 1000 milliGRAM(s) IV Intermittent every 12 hours  pantoprazole  Injectable 40 milliGRAM(s) IV Push every 12 hours  petrolatum Ophthalmic Ointment 1 Application(s) Left EYE at bedtime  polyethylene glycol 3350 17 Gram(s) Oral daily  senna Syrup 10 milliLiter(s) Oral at bedtime  sodium chloride 3%  Inhalation 4 milliLiter(s) Inhalation every 6 hours  sucralfate suspension 1 Gram(s) Enteral Tube two times a day    MEDICATIONS  (PRN):      ALLERGIES:  Allergies    fluoroquinolone antibiotics (Other)  Cipro (Unknown)  Tegretol (Unknown)  carbamazepine (Other)    Intolerances        LABS:                        7.5    12.96 )-----------( 281      ( 29 Jan 2024 00:20 )             24.5     01-29    147<H>  |  109<H>  |  36<H>  ----------------------------<  207<H>  3.9   |  29  |  1.50<H>    Ca    7.7<L>      29 Jan 2024 00:20  Phos  2.6     01-29  Mg     2.20     01-29    TPro  5.8<L>  /  Alb  2.3<L>  /  TBili  0.2  /  DBili  x   /  AST  13  /  ALT  10  /  AlkPhos  112  01-29    PT/INR - ( 29 Jan 2024 00:20 )   PT: 12.7 sec;   INR: 1.14 ratio         PTT - ( 29 Jan 2024 00:20 )  PTT:30.5 sec  Urinalysis Basic - ( 29 Jan 2024 00:20 )    Color: x / Appearance: x / SG: x / pH: x  Gluc: 207 mg/dL / Ketone: x  / Bili: x / Urobili: x   Blood: x / Protein: x / Nitrite: x   Leuk Esterase: x / RBC: x / WBC x   Sq Epi: x / Non Sq Epi: x / Bacteria: x        RADIOLOGY & ADDITIONAL TESTS: Reviewed.

## 2024-01-30 LAB
ALBUMIN SERPL ELPH-MCNC: 2.5 G/DL — LOW (ref 3.3–5)
ALP SERPL-CCNC: 99 U/L — SIGNIFICANT CHANGE UP (ref 40–120)
ALT FLD-CCNC: 14 U/L — SIGNIFICANT CHANGE UP (ref 4–41)
ANION GAP SERPL CALC-SCNC: 7 MMOL/L — SIGNIFICANT CHANGE UP (ref 7–14)
APTT BLD: 37.4 SEC — HIGH (ref 24.5–35.6)
AST SERPL-CCNC: 18 U/L — SIGNIFICANT CHANGE UP (ref 4–40)
BASE EXCESS BLDV CALC-SCNC: 6.3 MMOL/L — HIGH (ref -2–3)
BILIRUB SERPL-MCNC: 0.2 MG/DL — SIGNIFICANT CHANGE UP (ref 0.2–1.2)
BLD GP AB SCN SERPL QL: NEGATIVE — SIGNIFICANT CHANGE UP
BLOOD GAS ARTERIAL COMPREHENSIVE RESULT: SIGNIFICANT CHANGE UP
BUN SERPL-MCNC: 34 MG/DL — HIGH (ref 7–23)
CA-I SERPL-SCNC: 1.13 MMOL/L — LOW (ref 1.15–1.33)
CALCIUM SERPL-MCNC: 7.8 MG/DL — LOW (ref 8.4–10.5)
CHLORIDE BLDV-SCNC: 113 MMOL/L — HIGH (ref 96–108)
CHLORIDE SERPL-SCNC: 110 MMOL/L — HIGH (ref 98–107)
CO2 BLDV-SCNC: 32.6 MMOL/L — HIGH (ref 22–26)
CO2 SERPL-SCNC: 28 MMOL/L — SIGNIFICANT CHANGE UP (ref 22–31)
CREAT SERPL-MCNC: 1.39 MG/DL — HIGH (ref 0.5–1.3)
EGFR: 48 ML/MIN/1.73M2 — LOW
GAS PNL BLDV: 144 MMOL/L — SIGNIFICANT CHANGE UP (ref 136–145)
GAS PNL BLDV: SIGNIFICANT CHANGE UP
GLUCOSE BLDC GLUCOMTR-MCNC: 161 MG/DL — HIGH (ref 70–99)
GLUCOSE BLDC GLUCOMTR-MCNC: 179 MG/DL — HIGH (ref 70–99)
GLUCOSE BLDC GLUCOMTR-MCNC: 205 MG/DL — HIGH (ref 70–99)
GLUCOSE BLDC GLUCOMTR-MCNC: 212 MG/DL — HIGH (ref 70–99)
GLUCOSE BLDV-MCNC: 108 MG/DL — HIGH (ref 70–99)
GLUCOSE SERPL-MCNC: 122 MG/DL — HIGH (ref 70–99)
HCO3 BLDV-SCNC: 31 MMOL/L — HIGH (ref 22–29)
HCT VFR BLD CALC: 25.9 % — LOW (ref 39–50)
HCT VFR BLDA CALC: 25 % — LOW (ref 39–51)
HGB BLD CALC-MCNC: 8.3 G/DL — LOW (ref 12.6–17.4)
HGB BLD-MCNC: 7.9 G/DL — LOW (ref 13–17)
LACTATE BLDV-MCNC: 1.3 MMOL/L — SIGNIFICANT CHANGE UP (ref 0.5–2)
MAGNESIUM SERPL-MCNC: 2.3 MG/DL — SIGNIFICANT CHANGE UP (ref 1.6–2.6)
MCHC RBC-ENTMCNC: 30 PG — SIGNIFICANT CHANGE UP (ref 27–34)
MCHC RBC-ENTMCNC: 30.5 GM/DL — LOW (ref 32–36)
MCV RBC AUTO: 98.5 FL — SIGNIFICANT CHANGE UP (ref 80–100)
NRBC # BLD: 0 /100 WBCS — SIGNIFICANT CHANGE UP (ref 0–0)
NRBC # FLD: 0 K/UL — SIGNIFICANT CHANGE UP (ref 0–0)
PCO2 BLDV: 46 MMHG — SIGNIFICANT CHANGE UP (ref 42–55)
PH BLDV: 7.44 — HIGH (ref 7.32–7.43)
PHOSPHATE SERPL-MCNC: 2.1 MG/DL — LOW (ref 2.5–4.5)
PLATELET # BLD AUTO: 345 K/UL — SIGNIFICANT CHANGE UP (ref 150–400)
PO2 BLDV: 63 MMHG — HIGH (ref 25–45)
POTASSIUM BLDV-SCNC: 4.6 MMOL/L — SIGNIFICANT CHANGE UP (ref 3.5–5.1)
POTASSIUM SERPL-MCNC: 4.2 MMOL/L — SIGNIFICANT CHANGE UP (ref 3.5–5.3)
POTASSIUM SERPL-SCNC: 4.2 MMOL/L — SIGNIFICANT CHANGE UP (ref 3.5–5.3)
PROT SERPL-MCNC: 6.1 G/DL — SIGNIFICANT CHANGE UP (ref 6–8.3)
RBC # BLD: 2.63 M/UL — LOW (ref 4.2–5.8)
RBC # FLD: 18.1 % — HIGH (ref 10.3–14.5)
RH IG SCN BLD-IMP: POSITIVE — SIGNIFICANT CHANGE UP
SAO2 % BLDV: 92.6 % — HIGH (ref 67–88)
SODIUM SERPL-SCNC: 145 MMOL/L — SIGNIFICANT CHANGE UP (ref 135–145)
WBC # BLD: 15.9 K/UL — HIGH (ref 3.8–10.5)
WBC # FLD AUTO: 15.9 K/UL — HIGH (ref 3.8–10.5)

## 2024-01-30 PROCEDURE — 99232 SBSQ HOSP IP/OBS MODERATE 35: CPT | Mod: FS

## 2024-01-30 PROCEDURE — 99291 CRITICAL CARE FIRST HOUR: CPT

## 2024-01-30 PROCEDURE — 99232 SBSQ HOSP IP/OBS MODERATE 35: CPT

## 2024-01-30 RX ORDER — FUROSEMIDE 40 MG
40 TABLET ORAL ONCE
Refills: 0 | Status: COMPLETED | OUTPATIENT
Start: 2024-01-30 | End: 2024-01-30

## 2024-01-30 RX ADMIN — SODIUM CHLORIDE 4 MILLILITER(S): 9 INJECTION INTRAMUSCULAR; INTRAVENOUS; SUBCUTANEOUS at 10:37

## 2024-01-30 RX ADMIN — Medication 81 MILLIGRAM(S): at 12:10

## 2024-01-30 RX ADMIN — SODIUM CHLORIDE 4 MILLILITER(S): 9 INJECTION INTRAMUSCULAR; INTRAVENOUS; SUBCUTANEOUS at 15:51

## 2024-01-30 RX ADMIN — CHLORHEXIDINE GLUCONATE 1 APPLICATION(S): 213 SOLUTION TOPICAL at 12:11

## 2024-01-30 RX ADMIN — Medication 1 DROP(S): at 17:43

## 2024-01-30 RX ADMIN — HEPARIN SODIUM 5000 UNIT(S): 5000 INJECTION INTRAVENOUS; SUBCUTANEOUS at 13:51

## 2024-01-30 RX ADMIN — Medication 3 MILLILITER(S): at 10:37

## 2024-01-30 RX ADMIN — Medication 2: at 12:09

## 2024-01-30 RX ADMIN — Medication 1: at 17:42

## 2024-01-30 RX ADMIN — SODIUM CHLORIDE 4 MILLILITER(S): 9 INJECTION INTRAMUSCULAR; INTRAVENOUS; SUBCUTANEOUS at 20:27

## 2024-01-30 RX ADMIN — Medication 1 APPLICATION(S): at 23:30

## 2024-01-30 RX ADMIN — Medication 3 MILLILITER(S): at 15:51

## 2024-01-30 RX ADMIN — LEVETIRACETAM 400 MILLIGRAM(S): 250 TABLET, FILM COATED ORAL at 17:42

## 2024-01-30 RX ADMIN — HEPARIN SODIUM 5000 UNIT(S): 5000 INJECTION INTRAVENOUS; SUBCUTANEOUS at 23:30

## 2024-01-30 RX ADMIN — Medication 1 DROP(S): at 23:41

## 2024-01-30 RX ADMIN — LIDOCAINE 1 PATCH: 4 CREAM TOPICAL at 08:26

## 2024-01-30 RX ADMIN — Medication 3 MILLILITER(S): at 03:20

## 2024-01-30 RX ADMIN — MEROPENEM 100 MILLIGRAM(S): 1 INJECTION INTRAVENOUS at 03:40

## 2024-01-30 RX ADMIN — Medication 1 GRAM(S): at 05:14

## 2024-01-30 RX ADMIN — HUMAN INSULIN 6 UNIT(S): 100 INJECTION, SUSPENSION SUBCUTANEOUS at 05:35

## 2024-01-30 RX ADMIN — PANTOPRAZOLE SODIUM 40 MILLIGRAM(S): 20 TABLET, DELAYED RELEASE ORAL at 17:42

## 2024-01-30 RX ADMIN — DEXMEDETOMIDINE HYDROCHLORIDE IN 0.9% SODIUM CHLORIDE 3.97 MICROGRAM(S)/KG/HR: 4 INJECTION INTRAVENOUS at 12:08

## 2024-01-30 RX ADMIN — Medication 1 GRAM(S): at 17:42

## 2024-01-30 RX ADMIN — MEROPENEM 100 MILLIGRAM(S): 1 INJECTION INTRAVENOUS at 15:21

## 2024-01-30 RX ADMIN — CHLORHEXIDINE GLUCONATE 15 MILLILITER(S): 213 SOLUTION TOPICAL at 17:43

## 2024-01-30 RX ADMIN — Medication 1 DROP(S): at 05:15

## 2024-01-30 RX ADMIN — CHLORHEXIDINE GLUCONATE 15 MILLILITER(S): 213 SOLUTION TOPICAL at 05:13

## 2024-01-30 RX ADMIN — Medication 40 MILLIGRAM(S): at 15:20

## 2024-01-30 RX ADMIN — Medication 85 MILLIMOLE(S): at 03:57

## 2024-01-30 RX ADMIN — PANTOPRAZOLE SODIUM 40 MILLIGRAM(S): 20 TABLET, DELAYED RELEASE ORAL at 05:14

## 2024-01-30 RX ADMIN — LEVETIRACETAM 400 MILLIGRAM(S): 250 TABLET, FILM COATED ORAL at 05:15

## 2024-01-30 RX ADMIN — Medication 3 MILLILITER(S): at 20:27

## 2024-01-30 RX ADMIN — Medication 2: at 05:36

## 2024-01-30 RX ADMIN — HUMAN INSULIN 6 UNIT(S): 100 INJECTION, SUSPENSION SUBCUTANEOUS at 12:10

## 2024-01-30 RX ADMIN — HEPARIN SODIUM 5000 UNIT(S): 5000 INJECTION INTRAVENOUS; SUBCUTANEOUS at 05:16

## 2024-01-30 RX ADMIN — HUMAN INSULIN 6 UNIT(S): 100 INJECTION, SUSPENSION SUBCUTANEOUS at 23:42

## 2024-01-30 RX ADMIN — LIDOCAINE 1 PATCH: 4 CREAM TOPICAL at 20:24

## 2024-01-30 RX ADMIN — ESCITALOPRAM OXALATE 10 MILLIGRAM(S): 10 TABLET, FILM COATED ORAL at 12:10

## 2024-01-30 RX ADMIN — DEXMEDETOMIDINE HYDROCHLORIDE IN 0.9% SODIUM CHLORIDE 3.97 MICROGRAM(S)/KG/HR: 4 INJECTION INTRAVENOUS at 05:40

## 2024-01-30 RX ADMIN — Medication 1 DROP(S): at 12:09

## 2024-01-30 RX ADMIN — SODIUM CHLORIDE 4 MILLILITER(S): 9 INJECTION INTRAMUSCULAR; INTRAVENOUS; SUBCUTANEOUS at 03:26

## 2024-01-30 NOTE — PROGRESS NOTE ADULT - SUBJECTIVE AND OBJECTIVE BOX
Interval Events: Tmax 100.1, wbc uptrending.       Allergies:  fluoroquinolone antibiotics (Other)  Cipro (Unknown)  Tegretol (Unknown)  carbamazepine (Other)      Hospital Medications:  albuterol/ipratropium for Nebulization 3 milliLiter(s) Nebulizer every 6 hours  artificial  tears Solution 1 Drop(s) Left EYE four times a day  aspirin  chewable 81 milliGRAM(s) Enteral Tube daily  chlorhexidine 0.12% Liquid 15 milliLiter(s) Oral Mucosa every 12 hours  chlorhexidine 2% Cloths 1 Application(s) Topical daily  dexMEDEtomidine Infusion 0.2 MICROgram(s)/kG/Hr IV Continuous <Continuous>  dextrose 50% Injectable 25 Gram(s) IV Push once  dextrose 50% Injectable 25 Gram(s) IV Push once  dextrose 50% Injectable 12.5 Gram(s) IV Push once  dextrose Oral Gel 15 Gram(s) Oral once  escitalopram 10 milliGRAM(s) Oral daily  glucagon  Injectable 1 milliGRAM(s) IntraMuscular once  heparin   Injectable 5000 Unit(s) SubCutaneous every 8 hours  insulin lispro (ADMELOG) corrective regimen sliding scale   SubCutaneous every 6 hours  insulin NPH human recombinant 6 Unit(s) SubCutaneous every 6 hours  levETIRAcetam  IVPB 250 milliGRAM(s) IV Intermittent every 12 hours  lidocaine   4% Patch 1 Patch Transdermal every 24 hours  meropenem  IVPB 1000 milliGRAM(s) IV Intermittent every 12 hours  pantoprazole  Injectable 40 milliGRAM(s) IV Push every 12 hours  petrolatum Ophthalmic Ointment 1 Application(s) Left EYE at bedtime  sodium chloride 3%  Inhalation 4 milliLiter(s) Inhalation every 6 hours  sucralfate suspension 1 Gram(s) Enteral Tube two times a day      ROS: unable to obtain from pt    Vital Signs:  Vital Signs Last 24 Hrs  T(C): 37.8 (2024 04:00), Max: 37.8 (2024 12:00)  T(F): 100.1 (2024 04:00), Max: 100.1 (2024 16:00)  HR: 102 (2024 09:00) (78 - 113)  BP: 127/63 (2024 08:00) (100/73 - 145/63)  BP(mean): 80 (2024 08:00) (63 - 95)  RR: 33 (2024 09:00) (16 - 33)  SpO2: 100% (2024 09:00) (96% - 100%)    Parameters below as of 2024 08:00  Patient On (Oxygen Delivery Method): cpap6/8    O2 Concentration (%): 50  Daily     Daily Weight in k (2024 04:00)      24 @ 07:01  -  24 @ 07:00  --------------------------------------------------------  IN: 1915.4 mL / OUT: 1525 mL / NET: 390.4 mL        LABS:                        7.9    15.90 )-----------( 345      ( 2024 02:00 )             25.9     Mean Cell Volume: 98.5 fL (24 @ 02:00)        145  |  110<H>  |  34<H>  ----------------------------<  122<H>  4.2   |  28  |  1.39<H>    Ca    7.8<L>      2024 02:19  Phos  2.1       Mg     2.30         TPro  6.1  /  Alb  2.5<L>  /  TBili  0.2  /  DBili  x   /  AST  18  /  ALT  14  /  AlkPhos  99  30    LIVER FUNCTIONS - ( 2024 02:19 )  Alb: 2.5 g/dL / Pro: 6.1 g/dL / ALK PHOS: 99 U/L / ALT: 14 U/L / AST: 18 U/L / GGT: x           PT/INR - ( 2024 00:20 )   PT: 12.7 sec;   INR: 1.14 ratio         PTT - ( 2024 02:00 )  PTT:37.4 sec  Urinalysis Basic - ( 2024 02:19 )    Color: x / Appearance: x / SG: x / pH: x  Gluc: 122 mg/dL / Ketone: x  / Bili: x / Urobili: x   Blood: x / Protein: x / Nitrite: x   Leuk Esterase: x / RBC: x / WBC x   Sq Epi: x / Non Sq Epi: x / Bacteria: x                              7.9    15.90 )-----------( 345      ( 2024 02:00 )             25.9                         7.5    12.96 )-----------( 281      ( 2024 00:20 )             24.5                         8.0    11.18 )-----------( 279      ( 2024 01:15 )             26.0       PHYSICAL EXAM  GENERAL: lying in bed, chronically ill appearing  HEENT: NCAT, +NGT  CHEST:  on vent  ABDOMEN:  soft, distended, appears non-tender, +scars to abd; rectal pouch w/ brown stool  EXTREMITIES: WWP  SKIN:  warm/dry  PSYCH: sedated, arousable   NEURO: no tremor noted            Interval Events: Tmax 100.1, wbc uptrending. pt seen and examined. son at bedside, doing well on cpap per nursing      Allergies:  fluoroquinolone antibiotics (Other)  Cipro (Unknown)  Tegretol (Unknown)  carbamazepine (Other)      Hospital Medications:  albuterol/ipratropium for Nebulization 3 milliLiter(s) Nebulizer every 6 hours  artificial  tears Solution 1 Drop(s) Left EYE four times a day  aspirin  chewable 81 milliGRAM(s) Enteral Tube daily  chlorhexidine 0.12% Liquid 15 milliLiter(s) Oral Mucosa every 12 hours  chlorhexidine 2% Cloths 1 Application(s) Topical daily  dexMEDEtomidine Infusion 0.2 MICROgram(s)/kG/Hr IV Continuous <Continuous>  dextrose 50% Injectable 25 Gram(s) IV Push once  dextrose 50% Injectable 25 Gram(s) IV Push once  dextrose 50% Injectable 12.5 Gram(s) IV Push once  dextrose Oral Gel 15 Gram(s) Oral once  escitalopram 10 milliGRAM(s) Oral daily  glucagon  Injectable 1 milliGRAM(s) IntraMuscular once  heparin   Injectable 5000 Unit(s) SubCutaneous every 8 hours  insulin lispro (ADMELOG) corrective regimen sliding scale   SubCutaneous every 6 hours  insulin NPH human recombinant 6 Unit(s) SubCutaneous every 6 hours  levETIRAcetam  IVPB 250 milliGRAM(s) IV Intermittent every 12 hours  lidocaine   4% Patch 1 Patch Transdermal every 24 hours  meropenem  IVPB 1000 milliGRAM(s) IV Intermittent every 12 hours  pantoprazole  Injectable 40 milliGRAM(s) IV Push every 12 hours  petrolatum Ophthalmic Ointment 1 Application(s) Left EYE at bedtime  sodium chloride 3%  Inhalation 4 milliLiter(s) Inhalation every 6 hours  sucralfate suspension 1 Gram(s) Enteral Tube two times a day      ROS: unable to obtain from pt    Vital Signs:  Vital Signs Last 24 Hrs  T(C): 37.8 (2024 04:00), Max: 37.8 (2024 12:00)  T(F): 100.1 (2024 04:00), Max: 100.1 (2024 16:00)  HR: 102 (2024 09:00) (78 - 113)  BP: 127/63 (2024 08:00) (100/73 - 145/63)  BP(mean): 80 (2024 08:00) (63 - 95)  RR: 33 (2024 09:00) (16 - 33)  SpO2: 100% (2024 09:00) (96% - 100%)    Parameters below as of 2024 08:00  Patient On (Oxygen Delivery Method): cpap6/8    O2 Concentration (%): 50  Daily     Daily Weight in k (2024 04:00)      24 @ 07:01  -  24 @ 07:00  --------------------------------------------------------  IN: 1915.4 mL / OUT: 1525 mL / NET: 390.4 mL        LABS:                        7.9    15.90 )-----------( 345      ( 2024 02:00 )             25.9     Mean Cell Volume: 98.5 fL (24 @ 02:00)        145  |  110<H>  |  34<H>  ----------------------------<  122<H>  4.2   |  28  |  1.39<H>    Ca    7.8<L>      2024 02:19  Phos  2.1       Mg     2.30         TPro  6.1  /  Alb  2.5<L>  /  TBili  0.2  /  DBili  x   /  AST  18  /  ALT  14  /  AlkPhos  99  30    LIVER FUNCTIONS - ( 2024 02:19 )  Alb: 2.5 g/dL / Pro: 6.1 g/dL / ALK PHOS: 99 U/L / ALT: 14 U/L / AST: 18 U/L / GGT: x           PT/INR - ( 2024 00:20 )   PT: 12.7 sec;   INR: 1.14 ratio         PTT - ( 2024 02:00 )  PTT:37.4 sec  Urinalysis Basic - ( 2024 02:19 )    Color: x / Appearance: x / SG: x / pH: x  Gluc: 122 mg/dL / Ketone: x  / Bili: x / Urobili: x   Blood: x / Protein: x / Nitrite: x   Leuk Esterase: x / RBC: x / WBC x   Sq Epi: x / Non Sq Epi: x / Bacteria: x                              7.9    15.90 )-----------( 345      ( 2024 02:00 )             25.9                         7.5    12.96 )-----------( 281      ( 2024 00:20 )             24.5                         8.0    11.18 )-----------( 279      ( 2024 01:15 )             26.0       PHYSICAL EXAM  GENERAL: lying in bed, chronically ill appearing  HEENT: NCAT, +NGT  CHEST:  on vent  ABDOMEN:  soft, distended, appears non-tender, +scars to abd; rectal pouch w/ brown stool  EXTREMITIES: WWP  SKIN:  warm/dry  PSYCH: sedated, arousable   NEURO: no tremor noted

## 2024-01-30 NOTE — PROGRESS NOTE ADULT - SUBJECTIVE AND OBJECTIVE BOX
INTERVAL HPI/OVERNIGHT EVENTS:    O/N:    SUBJECTIVE: Patient seen and examined at bedside.     CONSTITUTIONAL: No weakness, fevers or chills  EYES/ENT: No visual changes;  No vertigo or throat pain   NECK: No pain or stiffness  RESPIRATORY: No cough, wheezing, hemoptysis; No shortness of breath  CARDIOVASCULAR: No chest pain or palpitations  GASTROINTESTINAL: No abdominal or epigastric pain. No nausea, vomiting, or hematemesis; No diarrhea or constipation. No melena or hematochezia.  GENITOURINARY: No dysuria, frequency or hematuria  NEUROLOGICAL: No numbness or weakness  SKIN: No itching, rashes    OBJECTIVE:    VITAL SIGNS:  ICU Vital Signs Last 24 Hrs  T(C): 37.8 (30 Jan 2024 04:00), Max: 37.8 (29 Jan 2024 12:00)  T(F): 100.1 (30 Jan 2024 04:00), Max: 100.1 (29 Jan 2024 16:00)  HR: 96 (30 Jan 2024 06:35) (78 - 113)  BP: 117/54 (30 Jan 2024 06:00) (100/73 - 145/63)  BP(mean): 69 (30 Jan 2024 06:00) (63 - 95)  ABP: --  ABP(mean): --  RR: 20 (30 Jan 2024 06:00) (16 - 24)  SpO2: 100% (30 Jan 2024 06:35) (96% - 100%)    O2 Parameters below as of 30 Jan 2024 06:00  Patient On (Oxygen Delivery Method): ventilator    O2 Concentration (%): 50      Mode: CPAP with PS, FiO2: 50, PEEP: 6, PS: 8, MAP: 9, PIP: 20    01-29 @ 07:01  -  01-30 @ 07:00  --------------------------------------------------------  IN: 1915.4 mL / OUT: 1525 mL / NET: 390.4 mL      CAPILLARY BLOOD GLUCOSE      POCT Blood Glucose.: 205 mg/dL (30 Jan 2024 05:34)      PHYSICAL EXAM:    General: NAD  HEENT: NC/AT; PERRL, clear conjunctiva  Neck: supple  Respiratory: CTA b/l  Cardiovascular: +S1/S2; RRR  Abdomen: soft, NT/ND; +BS x4  Extremities: WWP, 2+ peripheral pulses b/l; no LE edema  Skin: normal color and turgor; no rash  Neurological:    MEDICATIONS:  MEDICATIONS  (STANDING):  albuterol/ipratropium for Nebulization 3 milliLiter(s) Nebulizer every 6 hours  artificial  tears Solution 1 Drop(s) Left EYE four times a day  aspirin  chewable 81 milliGRAM(s) Enteral Tube daily  chlorhexidine 0.12% Liquid 15 milliLiter(s) Oral Mucosa every 12 hours  chlorhexidine 2% Cloths 1 Application(s) Topical daily  dexMEDEtomidine Infusion 0.2 MICROgram(s)/kG/Hr (3.97 mL/Hr) IV Continuous <Continuous>  dextrose 50% Injectable 25 Gram(s) IV Push once  dextrose 50% Injectable 12.5 Gram(s) IV Push once  dextrose 50% Injectable 25 Gram(s) IV Push once  dextrose Oral Gel 15 Gram(s) Oral once  escitalopram 10 milliGRAM(s) Oral daily  glucagon  Injectable 1 milliGRAM(s) IntraMuscular once  heparin   Injectable 5000 Unit(s) SubCutaneous every 8 hours  insulin lispro (ADMELOG) corrective regimen sliding scale   SubCutaneous every 6 hours  insulin NPH human recombinant 6 Unit(s) SubCutaneous every 6 hours  levETIRAcetam  IVPB 250 milliGRAM(s) IV Intermittent every 12 hours  lidocaine   4% Patch 1 Patch Transdermal every 24 hours  meropenem  IVPB 1000 milliGRAM(s) IV Intermittent every 12 hours  pantoprazole  Injectable 40 milliGRAM(s) IV Push every 12 hours  petrolatum Ophthalmic Ointment 1 Application(s) Left EYE at bedtime  sodium chloride 3%  Inhalation 4 milliLiter(s) Inhalation every 6 hours  sucralfate suspension 1 Gram(s) Enteral Tube two times a day    MEDICATIONS  (PRN):      ALLERGIES:  Allergies    fluoroquinolone antibiotics (Other)  Cipro (Unknown)  Tegretol (Unknown)  carbamazepine (Other)    Intolerances        LABS:                        7.9    15.90 )-----------( 345      ( 30 Jan 2024 02:00 )             25.9     01-30    145  |  110<H>  |  34<H>  ----------------------------<  122<H>  4.2   |  28  |  1.39<H>    Ca    7.8<L>      30 Jan 2024 02:19  Phos  2.1     01-30  Mg     2.30     01-30    TPro  6.1  /  Alb  2.5<L>  /  TBili  0.2  /  DBili  x   /  AST  18  /  ALT  14  /  AlkPhos  99  01-30    PT/INR - ( 29 Jan 2024 00:20 )   PT: 12.7 sec;   INR: 1.14 ratio         PTT - ( 30 Jan 2024 02:00 )  PTT:37.4 sec  Urinalysis Basic - ( 30 Jan 2024 02:19 )    Color: x / Appearance: x / SG: x / pH: x  Gluc: 122 mg/dL / Ketone: x  / Bili: x / Urobili: x   Blood: x / Protein: x / Nitrite: x   Leuk Esterase: x / RBC: x / WBC x   Sq Epi: x / Non Sq Epi: x / Bacteria: x        RADIOLOGY & ADDITIONAL TESTS: Reviewed. INTERVAL HPI/OVERNIGHT EVENTS:    O/N: No overnight events.     SUBJECTIVE: Patient seen and examined at bedside.     ROS unobtainable due to patient's current condition     OBJECTIVE:    VITAL SIGNS:  ICU Vital Signs Last 24 Hrs  T(C): 37.8 (30 Jan 2024 04:00), Max: 37.8 (29 Jan 2024 12:00)  T(F): 100.1 (30 Jan 2024 04:00), Max: 100.1 (29 Jan 2024 16:00)  HR: 96 (30 Jan 2024 06:35) (78 - 113)  BP: 117/54 (30 Jan 2024 06:00) (100/73 - 145/63)  BP(mean): 69 (30 Jan 2024 06:00) (63 - 95)  ABP: --  ABP(mean): --  RR: 20 (30 Jan 2024 06:00) (16 - 24)  SpO2: 100% (30 Jan 2024 06:35) (96% - 100%)    O2 Parameters below as of 30 Jan 2024 06:00  Patient On (Oxygen Delivery Method): ventilator    O2 Concentration (%): 50      Mode: CPAP with PS, FiO2: 50, PEEP: 6, PS: 8, MAP: 9, PIP: 20    01-29 @ 07:01  -  01-30 @ 07:00  --------------------------------------------------------  IN: 1915.4 mL / OUT: 1525 mL / NET: 390.4 mL      CAPILLARY BLOOD GLUCOSE      POCT Blood Glucose.: 205 mg/dL (30 Jan 2024 05:34)      PHYSICAL EXAM:    General: NAD  HEENT: NC/AT; PERRL, clear conjunctiva  Neck: supple  Respiratory: CTA b/l  Cardiovascular: +S1/S2; RRR  Abdomen: soft, NT/ND; +BS x4  Extremities: WWP, 2+ peripheral pulses b/l; no LE edema  Skin: normal color and turgor; no rash  Neurological:    MEDICATIONS:  MEDICATIONS  (STANDING):  albuterol/ipratropium for Nebulization 3 milliLiter(s) Nebulizer every 6 hours  artificial  tears Solution 1 Drop(s) Left EYE four times a day  aspirin  chewable 81 milliGRAM(s) Enteral Tube daily  chlorhexidine 0.12% Liquid 15 milliLiter(s) Oral Mucosa every 12 hours  chlorhexidine 2% Cloths 1 Application(s) Topical daily  dexMEDEtomidine Infusion 0.2 MICROgram(s)/kG/Hr (3.97 mL/Hr) IV Continuous <Continuous>  dextrose 50% Injectable 25 Gram(s) IV Push once  dextrose 50% Injectable 12.5 Gram(s) IV Push once  dextrose 50% Injectable 25 Gram(s) IV Push once  dextrose Oral Gel 15 Gram(s) Oral once  escitalopram 10 milliGRAM(s) Oral daily  glucagon  Injectable 1 milliGRAM(s) IntraMuscular once  heparin   Injectable 5000 Unit(s) SubCutaneous every 8 hours  insulin lispro (ADMELOG) corrective regimen sliding scale   SubCutaneous every 6 hours  insulin NPH human recombinant 6 Unit(s) SubCutaneous every 6 hours  levETIRAcetam  IVPB 250 milliGRAM(s) IV Intermittent every 12 hours  lidocaine   4% Patch 1 Patch Transdermal every 24 hours  meropenem  IVPB 1000 milliGRAM(s) IV Intermittent every 12 hours  pantoprazole  Injectable 40 milliGRAM(s) IV Push every 12 hours  petrolatum Ophthalmic Ointment 1 Application(s) Left EYE at bedtime  sodium chloride 3%  Inhalation 4 milliLiter(s) Inhalation every 6 hours  sucralfate suspension 1 Gram(s) Enteral Tube two times a day    MEDICATIONS  (PRN):      ALLERGIES:  Allergies    fluoroquinolone antibiotics (Other)  Cipro (Unknown)  Tegretol (Unknown)  carbamazepine (Other)    Intolerances        LABS:                        7.9    15.90 )-----------( 345      ( 30 Jan 2024 02:00 )             25.9     01-30    145  |  110<H>  |  34<H>  ----------------------------<  122<H>  4.2   |  28  |  1.39<H>    Ca    7.8<L>      30 Jan 2024 02:19  Phos  2.1     01-30  Mg     2.30     01-30    TPro  6.1  /  Alb  2.5<L>  /  TBili  0.2  /  DBili  x   /  AST  18  /  ALT  14  /  AlkPhos  99  01-30    PT/INR - ( 29 Jan 2024 00:20 )   PT: 12.7 sec;   INR: 1.14 ratio         PTT - ( 30 Jan 2024 02:00 )  PTT:37.4 sec  Urinalysis Basic - ( 30 Jan 2024 02:19 )    Color: x / Appearance: x / SG: x / pH: x  Gluc: 122 mg/dL / Ketone: x  / Bili: x / Urobili: x   Blood: x / Protein: x / Nitrite: x   Leuk Esterase: x / RBC: x / WBC x   Sq Epi: x / Non Sq Epi: x / Bacteria: x        RADIOLOGY & ADDITIONAL TESTS: Reviewed.

## 2024-01-30 NOTE — PROGRESS NOTE ADULT - SUBJECTIVE AND OBJECTIVE BOX
Follow Up:    Fever    Interval History/ROS:  Tmax 100.1. Patient was seen and examined at bedside. Intubated. Unable to assess ROS. Per team and family, a plan for PEG/trach remains.    Allergies  fluoroquinolone antibiotics (Other)  Cipro (Unknown)  Tegretol (Unknown)  carbamazepine (Other)        ANTIMICROBIALS:  meropenem  IVPB 1000 every 12 hours      OTHER MEDS:  MEDICATIONS  (STANDING):  albuterol/ipratropium for Nebulization 3 every 6 hours  aspirin  chewable 81 daily  dexMEDEtomidine Infusion 0.2 <Continuous>  dextrose 50% Injectable 25 once  dextrose 50% Injectable 25 once  dextrose 50% Injectable 12.5 once  dextrose Oral Gel 15 once  escitalopram 10 daily  glucagon  Injectable 1 once  heparin   Injectable 5000 every 8 hours  insulin lispro (ADMELOG) corrective regimen sliding scale  every 6 hours  insulin NPH human recombinant 6 every 6 hours  levETIRAcetam  IVPB 250 every 12 hours  pantoprazole  Injectable 40 every 12 hours  sodium chloride 3%  Inhalation 4 every 6 hours  sucralfate suspension 1 two times a day      Vital Signs Last 24 Hrs  T(C): 37.5 (30 Jan 2024 12:00), Max: 37.8 (29 Jan 2024 16:00)  T(F): 99.5 (30 Jan 2024 12:00), Max: 100.1 (29 Jan 2024 16:00)  HR: 91 (30 Jan 2024 12:15) (78 - 113)  BP: 115/53 (30 Jan 2024 12:00) (100/73 - 145/63)  BP(mean): 68 (30 Jan 2024 12:00) (63 - 95)  RR: 18 (30 Jan 2024 12:15) (16 - 33)  SpO2: 99% (30 Jan 2024 12:15) (96% - 100%)    Parameters below as of 30 Jan 2024 12:15  Patient On (Oxygen Delivery Method): cpap     O2 Concentration (%): 40    PHYSICAL EXAM:  Constitutional: intubated  HEAD/EYES: L eye w/ subconjunctival hemorrhage  ENT:  +ETT  Cardiovascular:  normal S1, S2, no murmur, RRR  Respiratory:  mechanical breath sounds  GI:  soft, nondistended; +RUQ bilious drain  Skin:  No phlebitis; LUE hematoma w/ warmth                          7.9    15.90 )-----------( 345      ( 30 Jan 2024 02:00 )             25.9       01-30    145  |  110<H>  |  34<H>  ----------------------------<  122<H>  4.2   |  28  |  1.39<H>    Ca    7.8<L>      30 Jan 2024 02:19  Phos  2.1     01-30  Mg     2.30     01-30    TPro  6.1  /  Alb  2.5<L>  /  TBili  0.2  /  DBili  x   /  AST  18  /  ALT  14  /  AlkPhos  99  01-30      Urinalysis Basic - ( 30 Jan 2024 02:19 )    Color: x / Appearance: x / SG: x / pH: x  Gluc: 122 mg/dL / Ketone: x  / Bili: x / Urobili: x   Blood: x / Protein: x / Nitrite: x   Leuk Esterase: x / RBC: x / WBC x   Sq Epi: x / Non Sq Epi: x / Bacteria: x        MICROBIOLOGY:  v  .Body Fluid gallbladder  01-26-24   No growth to date.  --    No polymorphonuclear leukocytes per low power field  No organisms seen per oil power field      .Blood Blood  01-24-24   No growth at 5 days  --  --      .Bronchial Bronchial  01-22-24   Normal Respiratory Aurelia present  --    Moderate polymorphonuclear leukocytes seen per low power field  No squamous epithelial cells per low power field  No organisms seen per oil power field      .Blood Blood-Peripheral  01-20-24   No growth at 5 days  --  --      .Blood Blood-Peripheral  01-20-24   No growth at 5 days  --  --      .Bronchial R MIDDLE LOBE  01-19-24   Numerous Staphylococcus aureus  Normal Respiratory Aurelia present  --  Staphylococcus aureus          Rapid RVP Result: NotDetec (01-24 @ 22:38)        RADIOLOGY:  Imaging below independently reviewed.  < from: Xray Chest 1 View- PORTABLE-Urgent (Xray Chest 1 View- PORTABLE-Urgent .) (01.29.24 @ 12:33) >    ACC: [id number] EXAM:  XR CHEST PORTABLE URGENT 1V   ORDERED BY: JORDYN WEISS     PROCEDURE DATE:  01/29/2024          INTERPRETATION:  CLINICAL INDICATION: Pleural effusion    EXAM: Frontal radiograph of the chest.    COMPARISON: Chest radiograph from 1/22/2024    FINDINGS:  Left-sided pacemaker with leads intact. Status post median sternotomy.  Endotracheal tube with tip of in the mid trachea.  Enteric tube with tip in the stomach.  Bilateral airspace opacities appear increased in the upper lobes but   there has been partial clearing of the lower lungs.  Decrease in the right-sided pleural effusion with adjacent passive   atelectasis.  There is no pneumothorax.  The heart is not well evaluated in this position  The visualized osseous structures demonstrate no acute pathology.    IMPRESSION:  Decrease in the right-sided pleural effusion with adjacent passive   atelectasis.  Bilateral airspace opacities have changed since the prior study.    --- End of Report ---          ARIS FERNANDEZ DO; Resident Radiologist  This document has been electronically signed.  TARA FLOWERS MD; Attending Interventional Radiologist  This document has been electronically signed. Jan 29 2024  4:39PM    < end of copied text >

## 2024-01-30 NOTE — PROGRESS NOTE ADULT - ATTENDING COMMENTS
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 Course c/b acute cholecystitis s/p perc asa 1/26 by IR    #Acute hypoxemic respiratory failure  #MSSA Pneumonia  #Dysphagia  #R pleural effusion  #Acute cholecystitis  - c/w mechanical ventilatory support for now. Tolerating PSV this AM  - Family agreeable for PEG prior to extubation, GI consulted. Will try to finalize plans  - After PEG, will optimize respiratory status prior to extubation. Will plan for R diagnostic/therapeutic thora prior to extubation attempt  - c/w empiric Meropenem for now, f/u cultures  - Holding Brilinta given plan for thora (cardiac stents >1 year ago)    Neil Goode MD  Pulmonary & Critical Care

## 2024-01-30 NOTE — PROGRESS NOTE ADULT - ASSESSMENT
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     PLAN  =====Neurologic=====  #CVA  - prior CVA x3 with no residual deficits  #myoclonic jerks  - on Keppra will continue  - holding home  gabapentin and memantine in setting of somnolence  - continue lexapro  - wean precedex as tolerated       =====Pulmonary=====  #COVID  - s/p paxlovid and 1 dose remdesivir while inpatient  #Pleural effusions b/l  - CT chest from 1/16 showing new small bilateral pleural effusions/ atelectasis/ patchy bilateral ground-glass opacities are consistent with pulmonary edema.  - currently on zosyn for aspiration PNA from 1/20- 1/28   - s/p bronch for increasing secretions- on duonebs and HTS currently, intermittent IPV BID   - bronchial cultures with staph aureus, continue zosyn   - intubated on 1/22 for airway support, currently off sedatives attempt to extubate   - on solumedrol 40mg since 1/22- weaning to 30mg on 1/26 for 2 days and subsequently to 20mg x2days, 10mg x2 days -> will hold for now (1/27)   - POCUS with resolution of b/l pleural effusions, still holding brilinta until next week possible reaccumulation requiring pleural tap     =====Cardiovascular=====  Patient does not currently require vasopressors and is normotensive  #HTN  - on home amlodipine and metoprolol currently holding     #CAD  - on Brilinta (holding) aspirin and ranolazine continue   - as per vascular okay to hold Brilinta for possible pleural effusion thoracentesis  -have not heard back from cardiology regarding Brilinta / last stent 2022 , will hold     =====GI=====  #upper GI bleed  - s/p EGD showing dieulafoy lesion and esophagitis likely 2/2 NGT irritation s/p 2 clips  - on protonix IV BID continue   - CT on 1/25 with cholelithiasis and sludge HIDA on 1/26 confirming likely acute choley, plan for IR perc cholecystostomy 1/26     #Diet  - tube feeds NGT     =====Renal/=====  #Electrolytes  - Maintain K>4, Phos>3, Mag>2, iCal>1  - baseline cr 1.5, at baseline      =====Endocrine=====  #DM2  - on NPH 16 units q6 and SSI given solumedrol   - a1c 9.3, glucose wnl inpatient   - hypoglycemic overnight 1/26  started on d10 drips and insulin DC'd  - CTM w/ FSGs, will consider restarting insulin at lower dose  -NPH increasaed to 6u 1/28 due to hyperglycemia      =====Infectious Disease=====  #COVID   - s/p paxlovid and 1 dose remdesivir  # PNA  - CT chest showing ground glass opacities  - continue zosyn  - intermittent episodes of fevers, likely source GI given choleycystitis? culture NGTD  - ID consult 1/24-  CT maxillofacial CT head wnl  - CT chest with evolving GGO since 1/16    =====Heme/Onc=====  #DVT Ppx  - heparin sub-q    =====Ethics=====  FULL CODE. 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     PLAN  =====Neurologic=====  #CVA  - prior CVA x3 with no residual deficits  #myoclonic jerks  - on Keppra will continue  - holding home  gabapentin and memantine in setting of somnolence  - continue lexapro  - wean precedex as tolerated       =====Pulmonary=====  #COVID  - s/p paxlovid and 1 dose remdesivir while inpatient  #Pleural effusions b/l  - CT chest from 1/16 showing new small bilateral pleural effusions/ atelectasis/ patchy bilateral ground-glass opacities are consistent with pulmonary edema.  - currently on zosyn for aspiration PNA from 1/20- 1/28   - s/p bronch for increasing secretions- on duonebs and HTS currently, intermittent IPV BID   - bronchial cultures with staph aureus, continue zosyn   - intubated on 1/22 for airway support, currently off sedatives attempt to extubate   - improvement of effusions --> unlikely to need thora, will give lasix prior to extubation  - POCUS with resolution of b/l pleural effusions, still holding brilinta until next week possible reaccumulation requiring pleural tap     =====Cardiovascular=====  Patient does not currently require vasopressors and is normotensive  #HTN  - on home amlodipine and metoprolol currently holding     #CAD  - on Brilinta (holding) aspirin and ranolazine continue   - as per vascular okay to hold Brilinta for possible pleural effusion thoracentesis  -have not heard back from cardiology regarding Brilinta / last stent 2022 , will hold     =====GI=====  #upper GI bleed  - s/p EGD showing dieulafoy lesion and esophagitis likely 2/2 NGT irritation s/p 2 clips  - on protonix IV BID continue   - CT on 1/25 with cholelithiasis and sludge HIDA on 1/26 confirming likely acute choley, s/p IR perc cholecystostomy 1/26   - plan for PEG tube per familys GO    #Diet  - tube feeds NGT     =====Renal/=====  #Electrolytes  - Maintain K>4, Phos>3, Mag>2, iCal>1  - baseline cr 1.5, at baseline      =====Endocrine=====  #DM2  - on NPH 16 units q6 and SSI given solumedrol   - a1c 9.3, glucose wnl inpatient   - hypoglycemic overnight 1/26  started on d10 drips and insulin DC'd  - CTM w/ FSGs, will consider restarting insulin at lower dose  -NPH increasaed to 6u 1/28 due to hyperglycemia      =====Infectious Disease=====  #COVID   - s/p paxlovid and 1 dose remdesivir  # PNA  - CT chest showing ground glass opacities  - continue zosyn  - intermittent episodes of fevers, likely source GI given choleycystitis? culture NGTD  - ID consult 1/24-  CT maxillofacial CT head wnl  - CT chest with evolving GGO since 1/16    =====Heme/Onc=====  #DVT Ppx  - heparin sub-q    =====Ethics=====  FULL CODE.

## 2024-01-30 NOTE — PROGRESS NOTE ADULT - NS ATTEND AMEND GEN_ALL_CORE FT
Can evaluate swallow function again when extubated. Recommend evaluation for PEG placement when optimized from a respiratory standpoint and afebrile > 48 hours.

## 2024-01-30 NOTE — PROGRESS NOTE ADULT - SUBJECTIVE AND OBJECTIVE BOX
Patient seen and examined.       REVIEW OF SYSTEMS:  UTO intubated     MEDICATIONS  (STANDING):  albuterol/ipratropium for Nebulization 3 milliLiter(s) Nebulizer every 6 hours  artificial  tears Solution 1 Drop(s) Left EYE four times a day  aspirin  chewable 81 milliGRAM(s) Enteral Tube daily  chlorhexidine 0.12% Liquid 15 milliLiter(s) Oral Mucosa every 12 hours  chlorhexidine 2% Cloths 1 Application(s) Topical daily  dexMEDEtomidine Infusion 0.2 MICROgram(s)/kG/Hr (3.97 mL/Hr) IV Continuous <Continuous>  dextrose 50% Injectable 25 Gram(s) IV Push once  dextrose 50% Injectable 25 Gram(s) IV Push once  dextrose 50% Injectable 12.5 Gram(s) IV Push once  dextrose Oral Gel 15 Gram(s) Oral once  escitalopram 10 milliGRAM(s) Oral daily  glucagon  Injectable 1 milliGRAM(s) IntraMuscular once  heparin   Injectable 5000 Unit(s) SubCutaneous every 8 hours  insulin lispro (ADMELOG) corrective regimen sliding scale   SubCutaneous every 6 hours  insulin NPH human recombinant 3 Unit(s) SubCutaneous every 6 hours  levETIRAcetam  IVPB 250 milliGRAM(s) IV Intermittent every 12 hours  lidocaine   4% Patch 1 Patch Transdermal every 24 hours  pantoprazole  Injectable 40 milliGRAM(s) IV Push every 12 hours  petrolatum Ophthalmic Ointment 1 Application(s) Left EYE at bedtime  piperacillin/tazobactam IVPB.. 3.375 Gram(s) IV Intermittent every 8 hours  polyethylene glycol 3350 17 Gram(s) Oral daily  senna Syrup 10 milliLiter(s) Oral at bedtime  sodium chloride 3%  Inhalation 4 milliLiter(s) Inhalation every 6 hours  sucralfate suspension 1 Gram(s) Enteral Tube two times a day      VITAL:  T(C): , Max: 38.3 (01-28-24 @ 08:00)  T(F): , Max: 101 (01-28-24 @ 08:00)  HR: 86 (01-28-24 @ 10:00)  BP: 121/49 (01-28-24 @ 10:00)  BP(mean): 66 (01-28-24 @ 10:00)  RR: 21 (01-28-24 @ 10:00)  SpO2: 100% (01-28-24 @ 10:00)  Wt(kg): --    I and O's:    01-27 @ 07:01 - 01-28 @ 07:00  --------------------------------------------------------  IN: 2236.5 mL / OUT: 680 mL / NET: 1556.5 mL    01-28 @ 07:01 - 01-28 @ 10:20  --------------------------------------------------------  IN: 278.8 mL / OUT: 0 mL / NET: 278.8 mL          PHYSICAL EXAM:    Constitutional: on vent  HEENT:  intubated  Neck: No LAD, No JVD  Respiratory: diminished b/l  Cardiovascular: S1 and S2  Gastrointestinal: BS+, soft, NT/ND  Extremities: + l/e edema  Neurological: UTO  : + Moss  Skin: No rashes  Access: Not applicable      LABS:                        8.0    11.18 )-----------( 279      ( 28 Jan 2024 01:15 )             26.0     01-28    142  |  104  |  39<H>  ----------------------------<  324<H>  4.0   |  26  |  1.57<H>    Ca    8.2<L>      28 Jan 2024 01:15  Phos  3.5     01-28  Mg     2.30     01-28    TPro  6.5  /  Alb  2.6<L>  /  TBili  0.3  /  DBili  x   /  AST  11  /  ALT  12  /  AlkPhos  89  01-28      Urine Studies:  Urinalysis Basic - ( 28 Jan 2024 01:15 )    Color: x / Appearance: x / SG: x / pH: x  Gluc: 324 mg/dL / Ketone: x  / Bili: x / Urobili: x   Blood: x / Protein: x / Nitrite: x   Leuk Esterase: x / RBC: x / WBC x   Sq Epi: x / Non Sq Epi: x / Bacteria: x      Assessment and Plan:   · Assessment	  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  Renal fxn remains stable   Hypophosphatemia- stable  Hypokalemia - stable  Hypertensive urgency -resolved --- BP meds as ordered; BP acceptable   Pulm - resp failure - intubated  EEG pending   hypernatremia --improved/stable    1 Renal - crt stable, no objection to lasix 40mg IV once  PRN , in case of distress  cont  free water 150 cc every 8h  Trend Phos level daily   4 CV - BP controlled   5 Vasc - s/p SMA stent placement;   6 Sx - s/p HIDA + for acute asa, medical management, .tube feedings   7 GI - s/p EGD clipping of bleeding duodenal ulcers   8 Anemia-continue to trend H/H; suggest PRBC for Hgb <7.0; transfuse per primary team   9 Pul - intubated  [facility]   10- ID follow up; patient noted to be febrile this am.  11 Glycemic control as able           Elsa Nunez  [facility]  Office: [phone]             Patient seen and examined.   intubated , tolerated CPAP today   son at bedside     REVIEW OF SYSTEMS:  UTO intubated     MEDICATIONS  (STANDING):  albuterol/ipratropium for Nebulization 3 milliLiter(s) Nebulizer every 6 hours  artificial  tears Solution 1 Drop(s) Left EYE four times a day  aspirin  chewable 81 milliGRAM(s) Enteral Tube daily  chlorhexidine 0.12% Liquid 15 milliLiter(s) Oral Mucosa every 12 hours  chlorhexidine 2% Cloths 1 Application(s) Topical daily  dexMEDEtomidine Infusion 0.2 MICROgram(s)/kG/Hr (3.97 mL/Hr) IV Continuous <Continuous>  dextrose 50% Injectable 25 Gram(s) IV Push once  dextrose 50% Injectable 25 Gram(s) IV Push once  dextrose 50% Injectable 12.5 Gram(s) IV Push once  dextrose Oral Gel 15 Gram(s) Oral once  escitalopram 10 milliGRAM(s) Oral daily  glucagon  Injectable 1 milliGRAM(s) IntraMuscular once  heparin   Injectable 5000 Unit(s) SubCutaneous every 8 hours  insulin lispro (ADMELOG) corrective regimen sliding scale   SubCutaneous every 6 hours  insulin NPH human recombinant 3 Unit(s) SubCutaneous every 6 hours  levETIRAcetam  IVPB 250 milliGRAM(s) IV Intermittent every 12 hours  lidocaine   4% Patch 1 Patch Transdermal every 24 hours  pantoprazole  Injectable 40 milliGRAM(s) IV Push every 12 hours  petrolatum Ophthalmic Ointment 1 Application(s) Left EYE at bedtime  piperacillin/tazobactam IVPB.. 3.375 Gram(s) IV Intermittent every 8 hours  polyethylene glycol 3350 17 Gram(s) Oral daily  senna Syrup 10 milliLiter(s) Oral at bedtime  sodium chloride 3%  Inhalation 4 milliLiter(s) Inhalation every 6 hours  sucralfate suspension 1 Gram(s) Enteral Tube two times a day      VITAL:  T(C): , Max: 38.3 (01-28-24 @ 08:00)  T(F): , Max: 101 (01-28-24 @ 08:00)  HR: 86 (01-28-24 @ 10:00)  BP: 121/49 (01-28-24 @ 10:00)  BP(mean): 66 (01-28-24 @ 10:00)  RR: 21 (01-28-24 @ 10:00)  SpO2: 100% (01-28-24 @ 10:00)  Wt(kg): --    I and O's:    01-27 @ 07:01 - 01-28 @ 07:00  --------------------------------------------------------  IN: 2236.5 mL / OUT: 680 mL / NET: 1556.5 mL    01-28 @ 07:01 - 01-28 @ 10:20  --------------------------------------------------------  IN: 278.8 mL / OUT: 0 mL / NET: 278.8 mL          PHYSICAL EXAM:    Constitutional: on vent  HEENT:  intubated  Neck: No LAD, No JVD  Respiratory: diminished b/l  Cardiovascular: S1 and S2  Gastrointestinal: BS+, soft, NT/ND  Extremities: + l/e edema  Neurological: UTO  : + Moss  Skin: No rashes  Access: Not applicable      LABS:                        8.0    11.18 )-----------( 279      ( 28 Jan 2024 01:15 )             26.0     01-28    142  |  104  |  39<H>  ----------------------------<  324<H>  4.0   |  26  |  1.57<H>    Ca    8.2<L>      28 Jan 2024 01:15  Phos  3.5     01-28  Mg     2.30     01-28    TPro  6.5  /  Alb  2.6<L>  /  TBili  0.3  /  DBili  x   /  AST  11  /  ALT  12  /  AlkPhos  89  01-28      Urine Studies:  Urinalysis Basic - ( 28 Jan 2024 01:15 )    Color: x / Appearance: x / SG: x / pH: x  Gluc: 324 mg/dL / Ketone: x  / Bili: x / Urobili: x   Blood: x / Protein: x / Nitrite: x   Leuk Esterase: x / RBC: x / WBC x   Sq Epi: x / Non Sq Epi: x / Bacteria: x      Assessment and Plan:   · Assessment	  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  Renal fxn remains stable   Hypophosphatemia- stable  Hypokalemia - stable  Hypertensive urgency -resolved --- BP meds as ordered; BP acceptable   Pulm - resp failure - intubated  EEG pending   hypernatremia --improved/stable    1 Renal - crt stable, no objection to lasix 40mg IV once  PRN , in case of distress  cont  free water 150 cc every 8h  Trend Phos level daily   4 CV - BP controlled   5 Vasc - s/p SMA stent placement;   6 Sx - s/p HIDA + for acute asa, medical management, .tube feedings   7 GI - s/p EGD clipping of bleeding duodenal ulcers   8 Anemia-continue to trend H/H; suggest PRBC for Hgb <7.0; transfuse per primary team   9 Pul - intubated  [facility]   10- ID follow up; patient noted to be febrile this am.  11 Glycemic control as able           Elsa Nunez  [facility]  Office: [phone]

## 2024-01-30 NOTE — PROGRESS NOTE ADULT - ASSESSMENT
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 Patient states that he has been having a dry cough for the past week, worsened over the past few days. Denies SOB with the cough, denies hemoptysis. Reports fevers at home to 101.5 with associated diaphoresis. Said that he has significant pinprick like b/l chest pain with his cough but denies any chest pain when not coughing or with ambulation. Also reports rhinorrhea and sore throat. Reports generalized weakness and poor appetite. States his daughter was visiting him over the week end last week and she was positive for COVID19. Patient tested positive for COVID19 2 days prior and was started on paxlovid as outpatient. Of note, family states that the patient has had worsening confusion at home over the past 6 months (becoming disoriented to time) but recently has been more confused, talking about seeing people that are not present.   On arrival to the ED his vitals were T 98 -> 99.2, P 80, /72, RR 18, ) sat 100% RA. His lab work showed leukocytosis with neutrophilia and bandemia, MABLE, mild hyponatremia, indeterminant but stable troponins, and elevated lactate to 2.3. His COVID19 swab was positive. His CXR showed bibasilar crackles.  (23 Dec 2023 22:51):  pt has been here for past two weeks and now pulm called fro excessive secretions   he is able to answer simple questions:  grand sons at bedside:     Covid / Resp failure/on 2 L of oxygen  :  CADS/P CABG  DM  CKD  HTN  CVA X 3    Acute Cholecystitis   -New:  s/p perc asa tune:   -on antibtiocs:   -on no vasopressors:    -id following   1/29 : cont antibiotics  1/30:  on antibtiocs : meropenem  Covid / Resp failure/on 2 L of oxygen:  -he was treated for covid before:   -he has excessive secretions  -keep o2 sao2 above 90%  -add BD and mucomyst: ;  -add mucinex:   1/10: he seems to be doing  ok: cont mucomyst and bd   1/11 ?: add benzonatate and Robitussin prm : dc dornase  1/12: seems OK: no sob:  no cough : no phlegm : has gurgling sound in throat:  looks comfortable  1/14: he is on room air:  with good sao2:  finished covid rx:  cont current rx:  high risk for aspiration as he has gurgling secretions in throat   1/15: would do ct chest he seems to have increasing effusions:  his ct scan from last week of december:  has not much of effusions: however will try lasix : [facility]    1/16: doing  ok : no os:b has secretions still : change to percussion bed:  cont bd  1/17: seems stable:  no sob: on 3 L of oxygen : should add ATC lasix to me as he has formed new pleural effusions:  echo with mod AS   1/18; dw pts son : who is a neurologist:  he has secretions in chest with atelectasis and yovani effusions with increased secretions:  the son requests bronchoscopy:  I have explained the advantages and disadvantages of the bronch at this age and he might not be able to be extubated soon after procedure:  but they want to go ahead with it: IP called   He will remain art risk for recurrent aspiration and atelectasis even after the procedure and they understand that    1/19: FOR BRONCH TODAY  : AGAIN CONFIRMED WITH NEUROLOGISTS SON  1/20 : events noted:  was successful extubated after the procedure:  but then he desaturated with increasing secretions and ended up on high flow and adm to MICU : on recent chest xray has mod large effusion on rigth side:  I think it needs a tap:  would defer to [facility] MICU team   ; [facility] cultures are still pending  1/21: dw fellow  consider tapping on rigth saide:  his PCP and son at bedside:  he is not obviously sob but still on 100%  high flow   1/22: he is doing poorly:  WBC increased:  bronch culturs with staph : nares with staph ? add vanco:  defer to MICU : in addition:  he has large effusion 0on rigth side: ? chest tube:  but brillinta needs to be stopped : [facility] MICU attending  1/23: got intubated : sedated on vasopressors:  s/p brocnh : [facility] again  : possibly needs tap on rigth side   1/24: cont current rx:  defer to primary team  : s/p bronch   -cont current medications   1/28: seems same to me  : intubated and is on precedex:  cont current pulm care:  on antibiotics  1/29: on cpap trials today : for possible extubation;  son at bedside; cont antibiotics  1/30: seems to be doing ok : intubated and on precedex  : on cpap trials:  extubation per primary team  DM  monitor and control   CKD  -cont current meds   HTN   -controlled  1/28:   -not on vasopressors:  antihypertensives being held  1/29: remains hemodynamically stable   1/30 : blood pressure  CVA X 3  -doing ok :     dw family  and team  1/28: currently intubated   1/29: on cpap trials for possible extubation   1/30: intubated:  and precedex  GI Bleed:   -secondary to Dieulafoy's lesion:  defer to primary team :    dvt prophylaxis:    dw son at bedside

## 2024-01-30 NOTE — PROGRESS NOTE ADULT - ASSESSMENT
89-yo M w/ PMH of CAD s/p CABG w/ stents, s/p PPM, DM, PVD, CVA, CKD, and myoclonic jerks, admitted on 12/23 i/s/o recent COVID. Partially treated for COVID w/ Paxlovid. Course complicated by CT C/A/P revealing findings suggestive of acute cholecystitis (12/24/23). 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 S/p exlap, mesenteric angiogram, and SMA stent (12/24). HIDA (12/29) supported the diagnosis of acute cholecystitis. Treated medically (Zosyn 12/24-31). Further c/b acute blood loss anemia 1/1, s/p pRBC. EGD on 1/2 w/ clipping. LUE hematoma noted 1/10 on venous Duplex, w/ visualization of complex, avascular fluid collection (5.3 x 2.7 x 3.7 cm), possibly hematoma. AMS noted on 1/16, w/ CT head finding suggestive of partial opacification of the L middle ear and mastoid air cells. CT chest on 1/16 revealed new small b/l pleural effusions a/w RML and RLL collapse. Patchy b/l GGO, c/f pulm edema. S/p BAL 1/19, revealing NRF w/ Staph aureus. Restarted on Zosyn 1/20. Repeat BAL 1/22 w/ no growth. Perc asa on 1/26.      Workup:  Covid on 12/23  Blood Cx (12/24, 1/20, 1/22): NGTD  Bronch Cx (119): MSSA, moderate yeast   Legionella urine Ag neg     Spiking fevers on 1/19 and again 1/23. Febrile until 1/25.  Leukocytosis increased on 1/20 8-->16-->21-->16-->17. Improved to 10 (1/26).    Antimicrobials:   Remdesivir 12/25-26  Zosyn 12/25-31, 1/20-->28  Cefepime 1/19-20  Dvaid 1/28-->    #Cholecystitis  #Acute hypoxic respiratory failure  #Pleural effusion  #Fever  #Leukocytosis  #MABLE on CKD  #Abnormal CT of head  #Conjunctival hemorrhage  #Hematoma  #Toxic metabolic encephalitis  #Debility  - AMS and acute hypoxic resp failure, intubated. BAL 1/19 w/ S aureus on Zosyn, now repeat BAL 1/22 w/ no growth  - Recent COVID. Superimposed infection can be within differential.  - CT max/face w/ no drainable abscess. CTAP and NM showed acute asa. S/p perc asa 1/26.  - F/u serum galactomannan and BCx. Fungitell neg.  - No objection continuing with meropenem at this time. F/u all cultures  - Trach/PEG planned. Monitoring fever and WBC curve. Extubation plan per MICU  - Monitor fever curve and WBC. Wean off oxygen per MICU management.    Plan discussed with MICU team.  Thank you for this consult. Inpatient ID team will follow.    Cheo Kelly MD, PhD  Attending Physician  Division of Infectious Diseases  Department of Medicine    Please contact through MS Teams message.  Office: [phone] (after 5 PM or weekend) .

## 2024-01-30 NOTE — PROGRESS NOTE ADULT - ASSESSMENT
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     EKG SR RBBB (old per family)     1) Hypoxia   - s/p bronch   - transferred to MICU now intubation  - Euvolemic on exam , Rt pleural effusion     2) CAD s/p CABG  -p/w chest pain. EKG non ischemic , trop -sera   - echo with normal lv and moderate AS   - c/w metoprolol   - chest pains  Trop mildly elevated and trending down likley sec to kidney disease,       3) Covid +  - s/p remdesivir   - c/w supportive management   - t/t per primary team     4) Abd distension   -CTA AP obtained which showed  partially occlusive calcified and non-calcified plaque just distal to take-off of the SMA, with estimated at least 75% luminal narrowing. Limited evaluation of its distal branches. Patient taken emergently to OR on 12/24 s/p diagnostic laparoscopy and SMA stent placement with brachial cutdown  -Surgery/vascular on board , f/u recs  - HIDA scan + for acute asa, medical management as poor surgical candidate cont zosyn  - asa hold Brilliant , restart when possible     5) UGIB  -GI on board s/p  EGD 1/2/24 which revealed bleeding vessel (likely Dieulafoy) s/p clipping and NGT trauma s/p clipping, fundus not fully visualized 2/2 clot, minute Tushar III lesion in duodenum.   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 EKG non ischemic , trop -sera   - echo with normal lv and moderate AS   - c/w metoprolol   - chest pains  Trop mildly elevated and trending down likley sec to kidney disease,       3) Covid +  - s/p remdesivir   - c/w supportive management   - t/t per primary team     4) Abd distension   -CTA AP obtained which showed  partially occlusive calcified and non-calcified plaque just distal to take-off of the SMA, with estimated at least 75% luminal narrowing. Limited evaluation of its distal branches. Patient taken emergently to OR on 12/24 s/p diagnostic laparoscopy and SMA stent placement with brachial cutdown  -Surgery/vascular on board , f/u recs  - HIDA scan + for acute asa, medical management as poor surgical candidate cont zosyn  - asa hold Brilliant , restart when possible     5) UGIB  -GI on board s/p  EGD 1/2/24 which revealed bleeding vessel (likely Dieulafoy) s/p clipping and NGT trauma s/p clipping, fundus not fully visualized 2/2 clot, minute Tushar III lesion in duodenum.   -on Protonix IV q 12

## 2024-01-30 NOTE — PROGRESS NOTE ADULT - SUBJECTIVE AND OBJECTIVE BOX
Aashish Boyer MD  Interventional Cardiology / Endovascular Specialist  [place] Office : [phone] [place] [address] [place] [address]  Tel:   [place] Office : [phone] [facility] [place] [address]  Tel: [phone]    Pt is lying in bed intubated  	  MEDICATIONS:  aspirin  chewable 81 milliGRAM(s) Enteral Tube daily  furosemide   Injectable 40 milliGRAM(s) IV Push once  heparin   Injectable 5000 Unit(s) SubCutaneous every 8 hours    meropenem  IVPB 1000 milliGRAM(s) IV Intermittent every 12 hours    albuterol/ipratropium for Nebulization 3 milliLiter(s) Nebulizer every 6 hours  sodium chloride 3%  Inhalation 4 milliLiter(s) Inhalation every 6 hours    dexMEDEtomidine Infusion 0.2 MICROgram(s)/kG/Hr IV Continuous <Continuous>  escitalopram 10 milliGRAM(s) Oral daily  levETIRAcetam  IVPB 250 milliGRAM(s) IV Intermittent every 12 hours    pantoprazole  Injectable 40 milliGRAM(s) IV Push every 12 hours  sucralfate suspension 1 Gram(s) Enteral Tube two times a day    dextrose 50% Injectable 25 Gram(s) IV Push once  dextrose 50% Injectable 25 Gram(s) IV Push once  dextrose 50% Injectable 12.5 Gram(s) IV Push once  dextrose Oral Gel 15 Gram(s) Oral once  glucagon  Injectable 1 milliGRAM(s) IntraMuscular once  insulin lispro (ADMELOG) corrective regimen sliding scale   SubCutaneous every 6 hours  insulin NPH human recombinant 6 Unit(s) SubCutaneous every 6 hours    artificial  tears Solution 1 Drop(s) Left EYE four times a day  chlorhexidine 0.12% Liquid 15 milliLiter(s) Oral Mucosa every 12 hours  chlorhexidine 2% Cloths 1 Application(s) Topical daily  lidocaine   4% Patch 1 Patch Transdermal every 24 hours  petrolatum Ophthalmic Ointment 1 Application(s) Left EYE at bedtime      PAST MEDICAL/SURGICAL HISTORY  PAST MEDICAL & SURGICAL HISTORY:  Hyperlipemia      Hypertension      Coronary Artery Disease      Diabetes Mellitus Type II      Stented Coronary Artery  total 5 stents, last stent 5/2019      Neuropathy      Myocardial infarction      Stroke  mild left facial numbness   no other residuals verbalized      Myoclonic jerking      Stage 3 chronic kidney disease      History of Cataract Extraction      Hx of CABG      H/O coronary angiogram      S/P coronary artery stent placement  1/6/09      S/P placement of cardiac pacemaker          SOCIAL HISTORY: Substance Use (street drugs): ( x ) never used  (  ) other:    FAMILY HISTORY:  No pertinent family history in first degree relatives        REVIEW OF SYSTEMS:  CONSTITUTIONAL: No fever, weight loss, or fatigue  EYES: No eye pain, visual disturbances, or discharge  ENMT:  No difficulty hearing, tinnitus, vertigo; No sinus or throat pain  BREASTS: No pain, masses, or nipple discharge  GASTROINTESTINAL: No abdominal or epigastric pain. No nausea, vomiting, or hematemesis; No diarrhea or constipation. No melena or hematochezia.  GENITOURINARY: No dysuria, frequency, hematuria, or incontinence  NEUROLOGICAL: No headaches, memory loss, loss of strength, numbness, or tremors  ENDOCRINE: No heat or cold intolerance; No hair loss  MUSCULOSKELETAL: No joint pain or swelling; No muscle, back, or extremity pain  PSYCHIATRIC: No depression, anxiety, mood swings, or difficulty sleeping  HEME/LYMPH: No easy bruising, or bleeding gums  All others negative    PHYSICAL EXAM:  T(C): 37.5 (01-30-24 @ 12:00), Max: 37.8 (01-29-24 @ 16:00)  HR: 97 (01-30-24 @ 15:12) (78 - 112)  BP: 125/57 (01-30-24 @ 14:00) (100/73 - 145/63)  RR: 21 (01-30-24 @ 14:50) (11 - 33)  SpO2: 100% (01-30-24 @ 15:12) (97% - 100%)  Wt(kg): --  I&O's Summary    29 Jan 2024 07:01  -  30 Jan 2024 07:00  --------------------------------------------------------  IN: 1915.4 mL / OUT: 1525 mL / NET: 390.4 mL    30 Jan 2024 07:01  -  30 Jan 2024 15:21  --------------------------------------------------------  IN: 507.7 mL / OUT: 325 mL / NET: 182.7 mL        GENERAL: intubated  EYES:   PERRLA   ENMT:   Moist mucous membranes, Good dentition, No lesions  Cardiovascular: Normal S1 S2, No JVD, No murmurs, No edema  Respiratory: b/l rhonchi   Gastrointestinal:  Soft, Non-tender, + BS	  Extremities: no edema                                7.9    15.90 )-----------( 345      ( 30 Jan 2024 02:00 )             25.9     01-30    145  |  110<H>  |  34<H>  ----------------------------<  122<H>  4.2   |  28  |  1.39<H>    Ca    7.8<L>      30 Jan 2024 02:19  Phos  2.1     01-30  Mg     2.30     01-30    TPro  6.1  /  Alb  2.5<L>  /  TBili  0.2  /  DBili  x   /  AST  18  /  ALT  14  /  AlkPhos  99  01-30    proBNP:   Lipid Profile:   HgA1c:   TSH:     Consultant(s) Notes Reviewed:  [x ] YES  [ ] NO    Care Discussed with Consultants/Other Providers [ x] YES  [ ] NO    Imaging Personally Reviewed independently:  [x] YES  [ ] NO    All labs, radiologic studies, vitals, orders and medications list reviewed. Patient is seen and examined at bedside. Case discussed with medical team.

## 2024-01-30 NOTE — PROGRESS NOTE ADULT - SUBJECTIVE AND OBJECTIVE BOX
Date of Service: 01-30-24 @ 10:22    Patient is a 89y old  Male who presents with a chief complaint of COVID19, Chest pain (30 Jan 2024 09:18)      Any change in ROS: son at bedside:  intubated and is on cpap trial:  awake  : on precedex    MEDICATIONS  (STANDING):  albuterol/ipratropium for Nebulization 3 milliLiter(s) Nebulizer every 6 hours  artificial  tears Solution 1 Drop(s) Left EYE four times a day  aspirin  chewable 81 milliGRAM(s) Enteral Tube daily  chlorhexidine 0.12% Liquid 15 milliLiter(s) Oral Mucosa every 12 hours  chlorhexidine 2% Cloths 1 Application(s) Topical daily  dexMEDEtomidine Infusion 0.2 MICROgram(s)/kG/Hr (3.97 mL/Hr) IV Continuous <Continuous>  dextrose 50% Injectable 25 Gram(s) IV Push once  dextrose 50% Injectable 25 Gram(s) IV Push once  dextrose 50% Injectable 12.5 Gram(s) IV Push once  dextrose Oral Gel 15 Gram(s) Oral once  escitalopram 10 milliGRAM(s) Oral daily  glucagon  Injectable 1 milliGRAM(s) IntraMuscular once  heparin   Injectable 5000 Unit(s) SubCutaneous every 8 hours  insulin lispro (ADMELOG) corrective regimen sliding scale   SubCutaneous every 6 hours  insulin NPH human recombinant 6 Unit(s) SubCutaneous every 6 hours  levETIRAcetam  IVPB 250 milliGRAM(s) IV Intermittent every 12 hours  lidocaine   4% Patch 1 Patch Transdermal every 24 hours  meropenem  IVPB 1000 milliGRAM(s) IV Intermittent every 12 hours  pantoprazole  Injectable 40 milliGRAM(s) IV Push every 12 hours  petrolatum Ophthalmic Ointment 1 Application(s) Left EYE at bedtime  sodium chloride 3%  Inhalation 4 milliLiter(s) Inhalation every 6 hours  sucralfate suspension 1 Gram(s) Enteral Tube two times a day    MEDICATIONS  (PRN):    Vital Signs Last 24 Hrs  T(C): 37.8 (30 Jan 2024 04:00), Max: 37.8 (29 Jan 2024 12:00)  T(F): 100.1 (30 Jan 2024 04:00), Max: 100.1 (29 Jan 2024 16:00)  HR: 102 (30 Jan 2024 09:00) (78 - 113)  BP: 127/63 (30 Jan 2024 08:00) (100/73 - 145/63)  BP(mean): 80 (30 Jan 2024 08:00) (63 - 95)  RR: 33 (30 Jan 2024 09:00) (16 - 33)  SpO2: 100% (30 Jan 2024 09:00) (96% - 100%)    Parameters below as of 30 Jan 2024 08:00  Patient On (Oxygen Delivery Method): cpap6/8    O2 Concentration (%): 50  Mode: CPAP with PS  FiO2: 50  PEEP: 6  PS: 8  MAP: 9  PIP: 20    I&O's Summary    29 Jan 2024 07:01  -  30 Jan 2024 07:00  --------------------------------------------------------  IN: 1915.4 mL / OUT: 1525 mL / NET: 390.4 mL          Physical Exam:   GENERAL: NAD, well-groomed, well-developed  HEENT: JAVAD/   Atraumatic, Normocephalic  ENMT: No tonsillar erythema, exudates, or enlargement; Moist mucous membranes, Good dentition, No lesions  NECK: Supple, No JVD, Normal thyroid  CHEST/LUNG: Clear to auscultaion  CVS: Regular rate and rhythm; No murmurs, rubs, or gallops  GI: : Soft, Nontender, Nondistended; Bowel sounds present  NERVOUS SYSTEM:  open eyes: intubated  : do not follow commands:   EXTREMITIES:  -edema  LYMPH: No lymphadenopathy noted  SKIN: No rashes or lesions  ENDOCRINOLOGY: No Thyromegaly  PSYCH:calm     Labs:  31, 32, 30, 30, 33, 32                            7.9    15.90 )-----------( 345      ( 30 Jan 2024 02:00 )             25.9                         7.5    12.96 )-----------( 281      ( 29 Jan 2024 00:20 )             24.5                         8.0    11.18 )-----------( 279      ( 28 Jan 2024 01:15 )             26.0                         7.6    9.03  )-----------( 232      ( 27 Jan 2024 01:05 )             23.8     01-30    145  |  110<H>  |  34<H>  ----------------------------<  122<H>  4.2   |  28  |  1.39<H>  01-29    147<H>  |  109<H>  |  36<H>  ----------------------------<  207<H>  3.9   |  29  |  1.50<H>  01-28    142  |  104  |  39<H>  ----------------------------<  324<H>  4.0   |  26  |  1.57<H>  01-27    142  |  102  |  37<H>  ----------------------------<  285<H>  3.7   |  29  |  1.57<H>    Ca    7.8<L>      30 Jan 2024 02:19  Ca    7.7<L>      29 Jan 2024 00:20  Phos  2.1     01-30  Phos  2.6     01-29  Mg     2.30     01-30  Mg     2.20     01-29    TPro  6.1  /  Alb  2.5<L>  /  TBili  0.2  /  DBili  x   /  AST  18  /  ALT  14  /  AlkPhos  99  01-30  TPro  5.8<L>  /  Alb  2.3<L>  /  TBili  0.2  /  DBili  x   /  AST  13  /  ALT  10  /  AlkPhos  112  01-29  TPro  6.5  /  Alb  2.6<L>  /  TBili  0.3  /  DBili  x   /  AST  11  /  ALT  12  /  AlkPhos  89  01-28  TPro  6.2  /  Alb  2.4<L>  /  TBili  0.4  /  DBili  x   /  AST  14  /  ALT  11  /  AlkPhos  57  01-27    CAPILLARY BLOOD GLUCOSE      POCT Blood Glucose.: 205 mg/dL (30 Jan 2024 05:34)  POCT Blood Glucose.: 120 mg/dL (29 Jan 2024 23:21)  POCT Blood Glucose.: 165 mg/dL (29 Jan 2024 18:51)  POCT Blood Glucose.: 224 mg/dL (29 Jan 2024 12:25)      LIVER FUNCTIONS - ( 30 Jan 2024 02:19 )  Alb: 2.5 g/dL / Pro: 6.1 g/dL / ALK PHOS: 99 U/L / ALT: 14 U/L / AST: 18 U/L / GGT: x           PT/INR - ( 29 Jan 2024 00:20 )   PT: 12.7 sec;   INR: 1.14 ratio         PTT - ( 30 Jan 2024 02:00 )  PTT:37.4 sec  Urinalysis Basic - ( 30 Jan 2024 02:19 )    Color: x / Appearance: x / SG: x / pH: x  Gluc: 122 mg/dL / Ketone: x  / Bili: x / Urobili: x   Blood: x / Protein: x / Nitrite: x   Leuk Esterase: x / RBC: x / WBC x   Sq Epi: x / Non Sq Epi: x / Bacteria: x      rad< from: Xray Chest 1 View- PORTABLE-Urgent (Xray Chest 1 View- PORTABLE-Urgent .) (01.29.24 @ 12:33) >    ACC: [id number] EXAM:  XR CHEST PORTABLE URGENT 1V   ORDERED BY: JORDYN WEISS     PROCEDURE DATE:  01/29/2024          INTERPRETATION:  CLINICAL INDICATION: Pleural effusion    EXAM: Frontal radiograph of the chest.    COMPARISON: Chest radiograph from 1/22/2024    FINDINGS:  Left-sided pacemaker with leads intact. Status post median sternotomy.  Endotracheal tube with tip of in the mid trachea.  Enteric tube with tip in the stomach.  Bilateral airspace opacities appear increased in the upper lobes but   there has been partial clearing of the lower lungs.  Decrease in the right-sided pleural effusion with adjacent passive   atelectasis.  There is no pneumothorax.  The heart is not well evaluated in this position  The visualized osseous structures demonstrate no acute pathology.    IMPRESSION:  Decrease in the right-sided pleural effusion with adjacent passive   atelectasis.  Bilateral airspace opacities have changed since the prior study.    --- End of Report ---          ARIS FERNANDEZ DO; Resident Radiologist    < end of copied text >        RECENT CULTURES:  01-26 @ 17:25 .Body Fluid gallbladder       No polymorphonuclear leukocytes per low power field  No organisms seen per oil power field           No growth to date.    01-24 @ 17:32 .Blood Blood                No growth at 5 days          RESPIRATORY CULTURES:          Studies  Chest X-RAY  CT SCAN Chest   Venous Dopplers: LE:   CT Abdomen  Others

## 2024-01-30 NOTE — PROGRESS NOTE ADULT - ASSESSMENT
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 1/29 GI reconsulted for consideration for peg.    # acute hypoxemic respiratory failure/mssa/possible aspiration pneumonia/R pleural effusion  - on vent and abx, MICU planning for potential dx/tx R thora  # dysphagia   - last mbs 1/19 (pre intubation) recommending pureed/mod thick liquids, current indication for peg unclear as peg does not eliminate risk of aspiration, however per dw primary team, family requesting placement- risks of PEG placement, both related to sedation and placement were reviewed w/ sons at bedside in detail, including, but not limited to infection, bleeding, injury to adjacent organs (or misplacement), leakage, peritonitis if pulled prior to stoma tract healing, aspiration, lack of safe window which would preclude placement, etc; given current clinical status, unclear that PEG would improve QOL for patient, however currently within GOC of family as per discussions w/ sons  # intermittent fevers - 1/24 bld cxs ngtd  # acute cholecystitis, sp IR perc asa 1/26  # prior UGIB - resolved, sp EGD - 2/2 Dieulafoy lesion s/p clipping and NGT trauma s/p clipping  # s/p SMA angiography with stent placement with diagnostic laparoscopy 12/24, currently only on asa, brillinta dcd on 1/24  # hx of CAD s/p CABG s/p stents (last stent May 2022)/mild to mod AS on echo  # normocytic anemia        INCOMPLETE 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 1/29 GI reconsulted for consideration for peg.    # acute hypoxemic respiratory failure/mssa/possible aspiration pneumonia/R pleural effusion  - on vent and abx, MICU planning for potential dx/tx R thora  # dysphagia   - last mbs 1/19 (pre intubation) recommending pureed/mod thick liquids, current indication for peg unclear as peg does not eliminate risk of aspiration, however per dw primary team, family requesting placement- risks of PEG placement, both related to sedation and placement were reviewed w/ sons at bedside in detail, including, but not limited to infection, bleeding, injury to adjacent organs (or misplacement), leakage, peritonitis if pulled prior to stoma tract healing, aspiration, lack of safe window which would preclude placement, etc; given current clinical status, unclear that PEG would improve QOL for patient, however potentially within GOC of family as per discussions w/ sons  # intermittent fevers - 1/24 bld cxs ngtd  # acute cholecystitis, sp IR perc asa 1/26  # prior UGIB - resolved, sp EGD - 2/2 Dieulafoy lesion s/p clipping and NGT trauma s/p clipping  # s/p SMA angiography with stent placement with diagnostic laparoscopy 12/24, currently only on asa, brillinta dcd on 1/24  # hx of CAD s/p CABG s/p stents (last stent May 2022)/mild to mod AS on echo  # normocytic anemia    Recommendations-   - no immediate plans for PEG; from GI perspective, if plan is ultimately for PEG would recommend placing post extubation when respiratory status optimized  - consider SLP re eval post extubation as feasible  - NGT feeds in interim  - trend cbc, transfuse for hgb >8   - vent management/rest of care as per MICU; GI to follow    dw son at bedside    *all recommendations are tentative until note is attested by attending*    HUBER Whitley PA-C, RD, CDN  available on TEAMS for questions  after 5pm/weekends, please contact the on-call GI fellow at [phone]   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 1/29 GI reconsulted for consideration for peg.    # acute hypoxemic respiratory failure/mssa/possible aspiration pneumonia/R pleural effusion  - on vent and abx, tolerating cpap trials, MICU planning for potential dx/tx R thora  # dysphagia   - last mbs 1/19 (pre intubation) recommending pureed/mod thick liquids, per dw primary team, family requesting placement- risks of PEG placement, both related to sedation and placement were reviewed w/ sons at bedside in detail prior, including, but not limited to infection, bleeding, injury to adjacent organs (or misplacement), leakage, peritonitis if pulled prior to stoma tract healing, aspiration, lack of safe window which would preclude placement, etc; current indication for peg unclear, peg does not eliminate risk of aspiration, though PEG currently within GOC of family as per extensive discussions w/ sons  # intermittent fevers - improving, 1/24 bld cxs ngtd  # acute cholecystitis, sp IR perc asa 1/26  # prior UGIB - resolved, sp EGD - 2/2 Dieulafoy lesion s/p clipping and NGT trauma s/p clipping  # s/p SMA angiography with stent placement with diagnostic laparoscopy 12/24, currently only on asa, brillinta dcd on 1/24  # hx of CAD s/p CABG s/p stents (last stent May 2022)/mild to mod AS on echo  # normocytic anemia    Recommendations-   - would recommend evaluation for PEG placement once optimized from respiratory perspective, however not unreasonable to obtain 2nd opinion if family amenable  - consider SLP re eval post extubation as feasible  - NGT feeds in interim  - trend cbc, transfuse for hgb >8   - vent management/rest of care as per MICU    dw son at bedside x2 today  dw gi attg    HUBER Whitley PA-C, RD, CDN  available on TEAMS for questions  after 5pm/weekends, please contact the on-call GI fellow at [phone]

## 2024-01-31 LAB
ALBUMIN SERPL ELPH-MCNC: 2.3 G/DL — LOW (ref 3.3–5)
ALBUMIN SERPL ELPH-MCNC: 2.5 G/DL — LOW (ref 3.3–5)
ALP SERPL-CCNC: 68 U/L — SIGNIFICANT CHANGE UP (ref 40–120)
ALP SERPL-CCNC: 80 U/L — SIGNIFICANT CHANGE UP (ref 40–120)
ALT FLD-CCNC: 11 U/L — SIGNIFICANT CHANGE UP (ref 4–41)
ALT FLD-CCNC: 20 U/L — SIGNIFICANT CHANGE UP (ref 4–41)
AMMONIA BLD-MCNC: 35 UMOL/L — SIGNIFICANT CHANGE UP (ref 11–55)
ANION GAP SERPL CALC-SCNC: 13 MMOL/L — SIGNIFICANT CHANGE UP (ref 7–14)
ANION GAP SERPL CALC-SCNC: 14 MMOL/L — SIGNIFICANT CHANGE UP (ref 7–14)
APTT BLD: 31.2 SEC — SIGNIFICANT CHANGE UP (ref 24.5–35.6)
AST SERPL-CCNC: 16 U/L — SIGNIFICANT CHANGE UP (ref 4–40)
AST SERPL-CCNC: 29 U/L — SIGNIFICANT CHANGE UP (ref 4–40)
BASE EXCESS BLDV CALC-SCNC: 1.8 MMOL/L — SIGNIFICANT CHANGE UP (ref -2–3)
BILIRUB SERPL-MCNC: 0.3 MG/DL — SIGNIFICANT CHANGE UP (ref 0.2–1.2)
BILIRUB SERPL-MCNC: 0.3 MG/DL — SIGNIFICANT CHANGE UP (ref 0.2–1.2)
BUN SERPL-MCNC: 36 MG/DL — HIGH (ref 7–23)
BUN SERPL-MCNC: 38 MG/DL — HIGH (ref 7–23)
CA-I SERPL-SCNC: 1.18 MMOL/L — SIGNIFICANT CHANGE UP (ref 1.15–1.33)
CALCIUM SERPL-MCNC: 7.9 MG/DL — LOW (ref 8.4–10.5)
CALCIUM SERPL-MCNC: 8.1 MG/DL — LOW (ref 8.4–10.5)
CHLORIDE BLDV-SCNC: 110 MMOL/L — HIGH (ref 96–108)
CHLORIDE SERPL-SCNC: 108 MMOL/L — HIGH (ref 98–107)
CHLORIDE SERPL-SCNC: 108 MMOL/L — HIGH (ref 98–107)
CO2 BLDV-SCNC: 30 MMOL/L — HIGH (ref 22–26)
CO2 SERPL-SCNC: 25 MMOL/L — SIGNIFICANT CHANGE UP (ref 22–31)
CO2 SERPL-SCNC: 27 MMOL/L — SIGNIFICANT CHANGE UP (ref 22–31)
CREAT SERPL-MCNC: 1.47 MG/DL — HIGH (ref 0.5–1.3)
CREAT SERPL-MCNC: 1.64 MG/DL — HIGH (ref 0.5–1.3)
EGFR: 39 ML/MIN/1.73M2 — LOW
EGFR: 45 ML/MIN/1.73M2 — LOW
GALACTOMANNAN AG SERPL-ACNC: 0.13 INDEX — SIGNIFICANT CHANGE UP (ref 0–0.49)
GAS PNL BLDA: SIGNIFICANT CHANGE UP
GAS PNL BLDV: 143 MMOL/L — SIGNIFICANT CHANGE UP (ref 136–145)
GAS PNL BLDV: SIGNIFICANT CHANGE UP
GAS PNL BLDV: SIGNIFICANT CHANGE UP
GLUCOSE BLDC GLUCOMTR-MCNC: 204 MG/DL — HIGH (ref 70–99)
GLUCOSE BLDC GLUCOMTR-MCNC: 225 MG/DL — HIGH (ref 70–99)
GLUCOSE BLDC GLUCOMTR-MCNC: 236 MG/DL — HIGH (ref 70–99)
GLUCOSE BLDV-MCNC: 202 MG/DL — HIGH (ref 70–99)
GLUCOSE SERPL-MCNC: 188 MG/DL — HIGH (ref 70–99)
GLUCOSE SERPL-MCNC: 211 MG/DL — HIGH (ref 70–99)
HCO3 BLDV-SCNC: 28 MMOL/L — SIGNIFICANT CHANGE UP (ref 22–29)
HCT VFR BLD CALC: 26 % — LOW (ref 39–50)
HCT VFR BLD CALC: 26.4 % — LOW (ref 39–50)
HCT VFR BLDA CALC: 24 % — LOW (ref 39–51)
HGB BLD CALC-MCNC: 7.9 G/DL — LOW (ref 12.6–17.4)
HGB BLD-MCNC: 7.9 G/DL — LOW (ref 13–17)
HGB BLD-MCNC: 7.9 G/DL — LOW (ref 13–17)
INR BLD: 1.12 RATIO — SIGNIFICANT CHANGE UP (ref 0.85–1.18)
LACTATE BLDV-MCNC: 1.4 MMOL/L — SIGNIFICANT CHANGE UP (ref 0.5–2)
MAGNESIUM SERPL-MCNC: 2.4 MG/DL — SIGNIFICANT CHANGE UP (ref 1.6–2.6)
MAGNESIUM SERPL-MCNC: 2.4 MG/DL — SIGNIFICANT CHANGE UP (ref 1.6–2.6)
MCHC RBC-ENTMCNC: 29.7 PG — SIGNIFICANT CHANGE UP (ref 27–34)
MCHC RBC-ENTMCNC: 29.9 GM/DL — LOW (ref 32–36)
MCHC RBC-ENTMCNC: 29.9 PG — SIGNIFICANT CHANGE UP (ref 27–34)
MCHC RBC-ENTMCNC: 30.4 GM/DL — LOW (ref 32–36)
MCV RBC AUTO: 100 FL — SIGNIFICANT CHANGE UP (ref 80–100)
MCV RBC AUTO: 97.7 FL — SIGNIFICANT CHANGE UP (ref 80–100)
NRBC # BLD: 0 /100 WBCS — SIGNIFICANT CHANGE UP (ref 0–0)
NRBC # BLD: 0 /100 WBCS — SIGNIFICANT CHANGE UP (ref 0–0)
NRBC # FLD: 0 K/UL — SIGNIFICANT CHANGE UP (ref 0–0)
NRBC # FLD: 0 K/UL — SIGNIFICANT CHANGE UP (ref 0–0)
PCO2 BLDV: 55 MMHG — SIGNIFICANT CHANGE UP (ref 42–55)
PH BLDV: 7.32 — SIGNIFICANT CHANGE UP (ref 7.32–7.43)
PHOSPHATE SERPL-MCNC: 4.3 MG/DL — SIGNIFICANT CHANGE UP (ref 2.5–4.5)
PHOSPHATE SERPL-MCNC: 5 MG/DL — HIGH (ref 2.5–4.5)
PLATELET # BLD AUTO: 332 K/UL — SIGNIFICANT CHANGE UP (ref 150–400)
PLATELET # BLD AUTO: 403 K/UL — HIGH (ref 150–400)
PO2 BLDV: 143 MMHG — HIGH (ref 25–45)
POTASSIUM BLDV-SCNC: 4.4 MMOL/L — SIGNIFICANT CHANGE UP (ref 3.5–5.1)
POTASSIUM SERPL-MCNC: 4.4 MMOL/L — SIGNIFICANT CHANGE UP (ref 3.5–5.3)
POTASSIUM SERPL-MCNC: 4.6 MMOL/L — SIGNIFICANT CHANGE UP (ref 3.5–5.3)
POTASSIUM SERPL-SCNC: 4.4 MMOL/L — SIGNIFICANT CHANGE UP (ref 3.5–5.3)
POTASSIUM SERPL-SCNC: 4.6 MMOL/L — SIGNIFICANT CHANGE UP (ref 3.5–5.3)
PROT SERPL-MCNC: 6.3 G/DL — SIGNIFICANT CHANGE UP (ref 6–8.3)
PROT SERPL-MCNC: 6.5 G/DL — SIGNIFICANT CHANGE UP (ref 6–8.3)
PROTHROM AB SERPL-ACNC: 12.5 SEC — SIGNIFICANT CHANGE UP (ref 9.5–13)
RBC # BLD: 2.64 M/UL — LOW (ref 4.2–5.8)
RBC # BLD: 2.66 M/UL — LOW (ref 4.2–5.8)
RBC # FLD: 17.6 % — HIGH (ref 10.3–14.5)
RBC # FLD: 18 % — HIGH (ref 10.3–14.5)
SAO2 % BLDV: 99.7 % — HIGH (ref 67–88)
SODIUM SERPL-SCNC: 147 MMOL/L — HIGH (ref 135–145)
SODIUM SERPL-SCNC: 148 MMOL/L — HIGH (ref 135–145)
WBC # BLD: 15.56 K/UL — HIGH (ref 3.8–10.5)
WBC # BLD: 18.7 K/UL — HIGH (ref 3.8–10.5)
WBC # FLD AUTO: 15.56 K/UL — HIGH (ref 3.8–10.5)
WBC # FLD AUTO: 18.7 K/UL — HIGH (ref 3.8–10.5)

## 2024-01-31 PROCEDURE — 71045 X-RAY EXAM CHEST 1 VIEW: CPT | Mod: 26

## 2024-01-31 PROCEDURE — 70496 CT ANGIOGRAPHY HEAD: CPT | Mod: 26

## 2024-01-31 PROCEDURE — 99232 SBSQ HOSP IP/OBS MODERATE 35: CPT

## 2024-01-31 PROCEDURE — 0042T: CPT

## 2024-01-31 PROCEDURE — 99291 CRITICAL CARE FIRST HOUR: CPT

## 2024-01-31 PROCEDURE — 70450 CT HEAD/BRAIN W/O DYE: CPT | Mod: 26,XU

## 2024-01-31 PROCEDURE — 70498 CT ANGIOGRAPHY NECK: CPT | Mod: 26

## 2024-01-31 RX ORDER — METOPROLOL TARTRATE 50 MG
2.5 TABLET ORAL ONCE
Refills: 0 | Status: DISCONTINUED | OUTPATIENT
Start: 2024-01-31 | End: 2024-01-31

## 2024-01-31 RX ORDER — LEVETIRACETAM 250 MG/1
500 TABLET, FILM COATED ORAL EVERY 12 HOURS
Refills: 0 | Status: DISCONTINUED | OUTPATIENT
Start: 2024-01-31 | End: 2024-02-05

## 2024-01-31 RX ORDER — DEXMEDETOMIDINE HYDROCHLORIDE IN 0.9% SODIUM CHLORIDE 4 UG/ML
0.2 INJECTION INTRAVENOUS
Qty: 400 | Refills: 0 | Status: DISCONTINUED | OUTPATIENT
Start: 2024-01-31 | End: 2024-02-01

## 2024-01-31 RX ORDER — LEVETIRACETAM 250 MG/1
1000 TABLET, FILM COATED ORAL ONCE
Refills: 0 | Status: COMPLETED | OUTPATIENT
Start: 2024-01-31 | End: 2024-01-31

## 2024-01-31 RX ORDER — SODIUM CHLORIDE 9 MG/ML
1000 INJECTION, SOLUTION INTRAVENOUS
Refills: 0 | Status: DISCONTINUED | OUTPATIENT
Start: 2024-01-31 | End: 2024-01-31

## 2024-01-31 RX ADMIN — Medication 3 MILLILITER(S): at 21:19

## 2024-01-31 RX ADMIN — LIDOCAINE 1 PATCH: 4 CREAM TOPICAL at 22:26

## 2024-01-31 RX ADMIN — Medication 3 MILLILITER(S): at 15:22

## 2024-01-31 RX ADMIN — LEVETIRACETAM 400 MILLIGRAM(S): 250 TABLET, FILM COATED ORAL at 22:42

## 2024-01-31 RX ADMIN — LIDOCAINE 1 PATCH: 4 CREAM TOPICAL at 07:05

## 2024-01-31 RX ADMIN — PANTOPRAZOLE SODIUM 40 MILLIGRAM(S): 20 TABLET, DELAYED RELEASE ORAL at 05:40

## 2024-01-31 RX ADMIN — CHLORHEXIDINE GLUCONATE 1 APPLICATION(S): 213 SOLUTION TOPICAL at 12:24

## 2024-01-31 RX ADMIN — SODIUM CHLORIDE 4 MILLILITER(S): 9 INJECTION INTRAMUSCULAR; INTRAVENOUS; SUBCUTANEOUS at 09:46

## 2024-01-31 RX ADMIN — HEPARIN SODIUM 5000 UNIT(S): 5000 INJECTION INTRAVENOUS; SUBCUTANEOUS at 05:41

## 2024-01-31 RX ADMIN — LEVETIRACETAM 400 MILLIGRAM(S): 250 TABLET, FILM COATED ORAL at 18:40

## 2024-01-31 RX ADMIN — HEPARIN SODIUM 5000 UNIT(S): 5000 INJECTION INTRAVENOUS; SUBCUTANEOUS at 14:41

## 2024-01-31 RX ADMIN — SODIUM CHLORIDE 4 MILLILITER(S): 9 INJECTION INTRAMUSCULAR; INTRAVENOUS; SUBCUTANEOUS at 03:32

## 2024-01-31 RX ADMIN — SODIUM CHLORIDE 4 MILLILITER(S): 9 INJECTION INTRAMUSCULAR; INTRAVENOUS; SUBCUTANEOUS at 21:19

## 2024-01-31 RX ADMIN — Medication 2: at 05:45

## 2024-01-31 RX ADMIN — Medication 3 MILLILITER(S): at 03:31

## 2024-01-31 RX ADMIN — PANTOPRAZOLE SODIUM 40 MILLIGRAM(S): 20 TABLET, DELAYED RELEASE ORAL at 18:22

## 2024-01-31 RX ADMIN — LEVETIRACETAM 400 MILLIGRAM(S): 250 TABLET, FILM COATED ORAL at 05:40

## 2024-01-31 RX ADMIN — MEROPENEM 100 MILLIGRAM(S): 1 INJECTION INTRAVENOUS at 16:52

## 2024-01-31 RX ADMIN — Medication 1 DROP(S): at 12:23

## 2024-01-31 RX ADMIN — Medication 1 APPLICATION(S): at 22:42

## 2024-01-31 RX ADMIN — Medication 3 MILLILITER(S): at 09:46

## 2024-01-31 RX ADMIN — LIDOCAINE 1 PATCH: 4 CREAM TOPICAL at 07:34

## 2024-01-31 RX ADMIN — Medication 1 DROP(S): at 05:44

## 2024-01-31 RX ADMIN — Medication 2: at 18:22

## 2024-01-31 RX ADMIN — Medication 2: at 12:23

## 2024-01-31 RX ADMIN — SODIUM CHLORIDE 4 MILLILITER(S): 9 INJECTION INTRAMUSCULAR; INTRAVENOUS; SUBCUTANEOUS at 15:22

## 2024-01-31 RX ADMIN — Medication 1 DROP(S): at 18:22

## 2024-01-31 RX ADMIN — MEROPENEM 100 MILLIGRAM(S): 1 INJECTION INTRAVENOUS at 03:17

## 2024-01-31 NOTE — PROGRESS NOTE ADULT - SUBJECTIVE AND OBJECTIVE BOX
Follow Up:    Fever    Interval History/ROS:  Afebrile ON. Extubated to [facility]. Patient was seen and examined at bedside. Awake, agitated, somnolent. ROS unable to assess.    Allergies  fluoroquinolone antibiotics (Other)  Cipro (Unknown)  Tegretol (Unknown)  carbamazepine (Other)        ANTIMICROBIALS:  meropenem  IVPB 1000 every 12 hours      OTHER MEDS:  MEDICATIONS  (STANDING):  albuterol/ipratropium for Nebulization 3 every 6 hours  aspirin  chewable 81 daily  dextrose 50% Injectable 25 once  dextrose 50% Injectable 12.5 once  dextrose 50% Injectable 25 once  dextrose Oral Gel 15 once  escitalopram 10 daily  glucagon  Injectable 1 once  heparin   Injectable 5000 every 8 hours  insulin lispro (ADMELOG) corrective regimen sliding scale  every 6 hours  insulin NPH human recombinant 6 every 6 hours  levETIRAcetam  IVPB 250 every 12 hours  pantoprazole  Injectable 40 every 12 hours  sodium chloride 3%  Inhalation 4 every 6 hours  sucralfate suspension 1 two times a day      Vital Signs Last 24 Hrs  T(C): 37 (31 Jan 2024 08:00), Max: 37.5 (30 Jan 2024 12:00)  T(F): 98.6 (31 Jan 2024 08:00), Max: 99.5 (30 Jan 2024 12:00)  HR: 110 (31 Jan 2024 11:26) (87 - 134)  BP: 92/71 (31 Jan 2024 10:00) (92/71 - 165/76)  BP(mean): 76 (31 Jan 2024 10:00) (51 - 118)  RR: 17 (31 Jan 2024 11:26) (11 - 22)  SpO2: 96% (31 Jan 2024 11:26) (87% - 100%)    Parameters below as of 31 Jan 2024 11:26  Patient On (Oxygen Delivery Method): nasal cannula, high flow  O2 Flow (L/min): 55  O2 Concentration (%): 60    PHYSICAL EXAM:  Constitutional: Awake, extubated  HEAD/EYES: L eye w/ subconjunctival hemorrhage  ENT:  dry oral mucosa  Cardiovascular:  normal S1, S2, no murmur, tachycardic  Respiratory:  coarse breath sounds  GI:  soft, nondistended                              7.9    15.56 )-----------( 332      ( 31 Jan 2024 08:13 )             26.4       01-31    147<H>  |  108<H>  |  38<H>  ----------------------------<  211<H>  4.6   |  25  |  1.64<H>    Ca    8.1<L>      31 Jan 2024 08:13  Phos  5.0     01-31  Mg     2.40     01-31    TPro  6.3  /  Alb  2.3<L>  /  TBili  0.3  /  DBili  x   /  AST  29  /  ALT  20  /  AlkPhos  68  01-31      Urinalysis Basic - ( 31 Jan 2024 08:13 )    Color: x / Appearance: x / SG: x / pH: x  Gluc: 211 mg/dL / Ketone: x  / Bili: x / Urobili: x   Blood: x / Protein: x / Nitrite: x   Leuk Esterase: x / RBC: x / WBC x   Sq Epi: x / Non Sq Epi: x / Bacteria: x        MICROBIOLOGY:  v  .Body Fluid gallbladder  01-26-24   No growth to date.  --    No polymorphonuclear leukocytes per low power field  No organisms seen per oil power field      .Blood Blood  01-24-24   No growth at 5 days  --  --      .Bronchial Bronchial  01-22-24   Normal Respiratory Aurelia present  --    Moderate polymorphonuclear leukocytes seen per low power field  No squamous epithelial cells per low power field  No organisms seen per oil power field      .Blood Blood-Peripheral  01-20-24   No growth at 5 days  --  --      .Blood Blood-Peripheral  01-20-24   No growth at 5 days  --  --      .Bronchial R MIDDLE LOBE  01-19-24   Numerous Staphylococcus aureus  Normal Respiratory Aurelia present  --  Staphylococcus aureus          Rapid RVP Result: NotDetec (01-24 @ 22:38)        RADIOLOGY:  Imaging below independently reviewed.  < from: Xray Chest 1 View- PORTABLE-Urgent (Xray Chest 1 View- PORTABLE-Urgent .) (01.31.24 @ 09:19) >  IMPRESSION:  Stable bilateral upper lung predominant opacities.    Increased right pleural effusion    < end of copied text >

## 2024-01-31 NOTE — PROGRESS NOTE ADULT - ATTENDING COMMENTS
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 Course c/b acute cholecystitis s/p perc asa 1/26 by IR    #Acute hypoxemic respiratory failure  #MSSA Pneumonia  #Dysphagia  #R pleural effusion  #Acute cholecystitis  #Encephalopathy - Likely 2/2 delirium vs toxic metabolic.   - Extubated 1/30, now on HFNC, wean as tolerated  - Now extubated, will evaluate need for PEG if continued concern for aspiration  - c/w empiric Meropenem for now, f/u cultures, NGTD. Plan for 5 day course  - Holding Brilintafor now for possible thora (cardiac stents >1 year ago)  - If mental status no improvement, will consider EEG    Neil Goode MD  Pulmonary & Critical Care

## 2024-01-31 NOTE — PROGRESS NOTE ADULT - SUBJECTIVE AND OBJECTIVE BOX
Patient seen and examined.    on NC   son at bedside     REVIEW OF SYSTEMS:  UTO intubated     MEDICATIONS  (STANDING):  albuterol/ipratropium for Nebulization 3 milliLiter(s) Nebulizer every 6 hours  artificial  tears Solution 1 Drop(s) Left EYE four times a day  aspirin  chewable 81 milliGRAM(s) Enteral Tube daily  chlorhexidine 0.12% Liquid 15 milliLiter(s) Oral Mucosa every 12 hours  chlorhexidine 2% Cloths 1 Application(s) Topical daily  dexMEDEtomidine Infusion 0.2 MICROgram(s)/kG/Hr (3.97 mL/Hr) IV Continuous <Continuous>  dextrose 50% Injectable 25 Gram(s) IV Push once  dextrose 50% Injectable 25 Gram(s) IV Push once  dextrose 50% Injectable 12.5 Gram(s) IV Push once  dextrose Oral Gel 15 Gram(s) Oral once  escitalopram 10 milliGRAM(s) Oral daily  glucagon  Injectable 1 milliGRAM(s) IntraMuscular once  heparin   Injectable 5000 Unit(s) SubCutaneous every 8 hours  insulin lispro (ADMELOG) corrective regimen sliding scale   SubCutaneous every 6 hours  insulin NPH human recombinant 3 Unit(s) SubCutaneous every 6 hours  levETIRAcetam  IVPB 250 milliGRAM(s) IV Intermittent every 12 hours  lidocaine   4% Patch 1 Patch Transdermal every 24 hours  pantoprazole  Injectable 40 milliGRAM(s) IV Push every 12 hours  petrolatum Ophthalmic Ointment 1 Application(s) Left EYE at bedtime  piperacillin/tazobactam IVPB.. 3.375 Gram(s) IV Intermittent every 8 hours  polyethylene glycol 3350 17 Gram(s) Oral daily  senna Syrup 10 milliLiter(s) Oral at bedtime  sodium chloride 3%  Inhalation 4 milliLiter(s) Inhalation every 6 hours  sucralfate suspension 1 Gram(s) Enteral Tube two times a day      VITAL:  T(C): , Max: 38.3 (01-28-24 @ 08:00)  T(F): , Max: 101 (01-28-24 @ 08:00)  HR: 86 (01-28-24 @ 10:00)  BP: 121/49 (01-28-24 @ 10:00)  BP(mean): 66 (01-28-24 @ 10:00)  RR: 21 (01-28-24 @ 10:00)  SpO2: 100% (01-28-24 @ 10:00)  Wt(kg): --    I and O's:    01-27 @ 07:01 - 01-28 @ 07:00  --------------------------------------------------------  IN: 2236.5 mL / OUT: 680 mL / NET: 1556.5 mL    01-28 @ 07:01 - 01-28 @ 10:20  --------------------------------------------------------  IN: 278.8 mL / OUT: 0 mL / NET: 278.8 mL          PHYSICAL EXAM:    Constitutional: on vent  HEENT:  intubated  Neck: No LAD, No JVD  Respiratory: diminished b/l  Cardiovascular: S1 and S2  Gastrointestinal: BS+, soft, NT/ND  Extremities: + l/e edema  Neurological: UTO  : + Moss  Skin: No rashes  Access: Not applicable      LABS:                        8.0    11.18 )-----------( 279      ( 28 Jan 2024 01:15 )             26.0     01-28    142  |  104  |  39<H>  ----------------------------<  324<H>  4.0   |  26  |  1.57<H>    Ca    8.2<L>      28 Jan 2024 01:15  Phos  3.5     01-28  Mg     2.30     01-28    TPro  6.5  /  Alb  2.6<L>  /  TBili  0.3  /  DBili  x   /  AST  11  /  ALT  12  /  AlkPhos  89  01-28      Urine Studies:  Urinalysis Basic - ( 28 Jan 2024 01:15 )    Color: x / Appearance: x / SG: x / pH: x  Gluc: 324 mg/dL / Ketone: x  / Bili: x / Urobili: x   Blood: x / Protein: x / Nitrite: x   Leuk Esterase: x / RBC: x / WBC x   Sq Epi: x / Non Sq Epi: x / Bacteria: x      Assessment and Plan:   · Assessment	  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  Renal fxn remains stable   Hypophosphatemia- stable  Hypokalemia - stable  Hypertensive urgency -resolved --- BP meds as ordered; BP acceptable   Pulm - resp failure - intubated  EEG pending   hypernatremia --improved/stable    1 Renal - crt stable, no objection to lasix 40mg IV once  PRN , in case of distress   cont  free water 150 cc every 6h.  Trend Phos level daily   4 CV - BP controlled   5 Vasc - s/p SMA stent placement;   6 Sx - s/p HIDA + for acute asa, medical management, .tube feedings   7 GI - s/p EGD clipping of bleeding duodenal ulcers   8 Anemia-continue to trend H/H; suggest PRBC for Hgb <7.0; transfuse per primary team   9 Pul - intubated  asper ICU   10- ID follow up; patient noted to be febrile this am.  11 Glycemic control as able           Elsa Nunez  [facility]  Office: [phone]

## 2024-01-31 NOTE — PROGRESS NOTE ADULT - SUBJECTIVE AND OBJECTIVE BOX
INTERVAL HPI/OVERNIGHT EVENTS:    O/N:    SUBJECTIVE: Patient seen and examined at bedside.     CONSTITUTIONAL: No weakness, fevers or chills  EYES/ENT: No visual changes;  No vertigo or throat pain   NECK: No pain or stiffness  RESPIRATORY: No cough, wheezing, hemoptysis; No shortness of breath  CARDIOVASCULAR: No chest pain or palpitations  GASTROINTESTINAL: No abdominal or epigastric pain. No nausea, vomiting, or hematemesis; No diarrhea or constipation. No melena or hematochezia.  GENITOURINARY: No dysuria, frequency or hematuria  NEUROLOGICAL: No numbness or weakness  SKIN: No itching, rashes    OBJECTIVE:    VITAL SIGNS:  ICU Vital Signs Last 24 Hrs  T(C): 37.1 (31 Jan 2024 04:00), Max: 37.7 (30 Jan 2024 08:00)  T(F): 98.8 (31 Jan 2024 04:00), Max: 99.8 (30 Jan 2024 08:00)  HR: 113 (31 Jan 2024 07:15) (87 - 134)  BP: 137/52 (31 Jan 2024 07:00) (114/55 - 165/76)  BP(mean): 74 (31 Jan 2024 07:00) (51 - 118)  ABP: --  ABP(mean): --  RR: 21 (31 Jan 2024 07:15) (11 - 33)  SpO2: 97% (31 Jan 2024 07:15) (87% - 100%)    O2 Parameters below as of 31 Jan 2024 07:15  Patient On (Oxygen Delivery Method): nasal cannula, high flow  O2 Flow (L/min): 55  O2 Concentration (%): 100      Mode: CPAP with PS, FiO2: 50, PEEP: 6, PS: 8, MAP: 10    01-30 @ 07:01  -  01-31 @ 07:00  --------------------------------------------------------  IN: 793.3 mL / OUT: 1780 mL / NET: -986.7 mL      CAPILLARY BLOOD GLUCOSE      POCT Blood Glucose.: 204 mg/dL (31 Jan 2024 05:18)      PHYSICAL EXAM:    General: NAD  HEENT: NC/AT; PERRL, clear conjunctiva  Neck: supple  Respiratory: CTA b/l  Cardiovascular: +S1/S2; RRR  Abdomen: soft, NT/ND; +BS x4  Extremities: WWP, 2+ peripheral pulses b/l; no LE edema  Skin: normal color and turgor; no rash  Neurological:    MEDICATIONS:  MEDICATIONS  (STANDING):  albuterol/ipratropium for Nebulization 3 milliLiter(s) Nebulizer every 6 hours  artificial  tears Solution 1 Drop(s) Left EYE four times a day  aspirin  chewable 81 milliGRAM(s) Enteral Tube daily  chlorhexidine 2% Cloths 1 Application(s) Topical daily  dextrose 50% Injectable 25 Gram(s) IV Push once  dextrose 50% Injectable 12.5 Gram(s) IV Push once  dextrose 50% Injectable 25 Gram(s) IV Push once  dextrose Oral Gel 15 Gram(s) Oral once  escitalopram 10 milliGRAM(s) Oral daily  glucagon  Injectable 1 milliGRAM(s) IntraMuscular once  heparin   Injectable 5000 Unit(s) SubCutaneous every 8 hours  insulin lispro (ADMELOG) corrective regimen sliding scale   SubCutaneous every 6 hours  insulin NPH human recombinant 6 Unit(s) SubCutaneous every 6 hours  levETIRAcetam  IVPB 250 milliGRAM(s) IV Intermittent every 12 hours  lidocaine   4% Patch 1 Patch Transdermal every 24 hours  meropenem  IVPB 1000 milliGRAM(s) IV Intermittent every 12 hours  pantoprazole  Injectable 40 milliGRAM(s) IV Push every 12 hours  petrolatum Ophthalmic Ointment 1 Application(s) Left EYE at bedtime  sodium chloride 3%  Inhalation 4 milliLiter(s) Inhalation every 6 hours  sucralfate suspension 1 Gram(s) Enteral Tube two times a day    MEDICATIONS  (PRN):      ALLERGIES:  Allergies    fluoroquinolone antibiotics (Other)  Cipro (Unknown)  Tegretol (Unknown)  carbamazepine (Other)    Intolerances        LABS:                        7.9    18.70 )-----------( 403      ( 31 Jan 2024 00:50 )             26.0     01-31    148<H>  |  108<H>  |  36<H>  ----------------------------<  188<H>  4.4   |  27  |  1.47<H>    Ca    7.9<L>      31 Jan 2024 00:50  Phos  4.3     01-31  Mg     2.40     01-31    TPro  6.5  /  Alb  2.5<L>  /  TBili  0.3  /  DBili  x   /  AST  16  /  ALT  11  /  AlkPhos  80  01-31    PT/INR - ( 31 Jan 2024 00:50 )   PT: 12.5 sec;   INR: 1.12 ratio         PTT - ( 31 Jan 2024 00:50 )  PTT:31.2 sec  Urinalysis Basic - ( 31 Jan 2024 00:50 )    Color: x / Appearance: x / SG: x / pH: x  Gluc: 188 mg/dL / Ketone: x  / Bili: x / Urobili: x   Blood: x / Protein: x / Nitrite: x   Leuk Esterase: x / RBC: x / WBC x   Sq Epi: x / Non Sq Epi: x / Bacteria: x        RADIOLOGY & ADDITIONAL TESTS: Reviewed.

## 2024-01-31 NOTE — PROGRESS NOTE ADULT - ASSESSMENT
89-yo M w/ PMH of CAD s/p CABG w/ stents, s/p PPM, DM, PVD, CVA, CKD, and myoclonic jerks, admitted on 12/23 i/s/o recent COVID. Partially treated for COVID w/ Paxlovid. Course complicated by CT C/A/P revealing findings suggestive of acute cholecystitis (12/24/23). 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 S/p exlap, mesenteric angiogram, and SMA stent (12/24). HIDA (12/29) supported the diagnosis of acute cholecystitis. Treated medically (Zosyn 12/24-31). Further c/b acute blood loss anemia 1/1, s/p pRBC. EGD on 1/2 w/ clipping. LUE hematoma noted 1/10 on venous Duplex, w/ visualization of complex, avascular fluid collection (5.3 x 2.7 x 3.7 cm), possibly hematoma. AMS noted on 1/16, w/ CT head finding suggestive of partial opacification of the L middle ear and mastoid air cells. CT chest on 1/16 revealed new small b/l pleural effusions a/w RML and RLL collapse. Patchy b/l GGO, c/f pulm edema. S/p BAL 1/19, revealing NRF w/ Staph aureus. Restarted on Zosyn 1/20. Repeat BAL 1/22 w/ no growth. Perc asa on 1/26.      Workup:  Covid on 12/23  Blood Cx (12/24, 1/20, 1/22): NGTD  Bronch Cx (119): MSSA, moderate yeast   Legionella urine Ag neg     Spiking fevers on 1/19 and again 1/23. Febrile until 1/25.  Leukocytosis increased on 1/20 8-->16-->21-->16-->17. Improved to 10 (1/26).    Antimicrobials:   Remdesivir 12/25-26  Zosyn 12/25-31, 1/20-->28  Cefepime 1/19-20  David 1/28-->    #Cholecystitis  #Acute hypoxic respiratory failure  #Pleural effusion  #Fever  #Leukocytosis  #MABLE on CKD  #Abnormal CT of head  #Conjunctival hemorrhage  #Hematoma  #Toxic metabolic encephalitis  #Debility  - AMS and acute hypoxic resp failure, intubated. BAL 1/19 w/ S aureus on Zosyn, now repeat BAL 1/22 w/ no growth  - Recent COVID. Superimposed infection can be within differential.  - CT max/face w/ no drainable abscess. CTAP and NM showed acute asa. S/p perc asa 1/26.  - Fungitell and galactomannan neg.  - No objection continuing with meropenem at this time. F/u all cultures  - Currently extubated. Per family at bedside, w/ AMS compared to yesterday. Defer to MICU re: respiratory management.  - Monitor fever curve and WBC. Wean off oxygen per MICU management.    Plan discussed with MICU team.  Thank you for this consult. Inpatient ID team will follow.    Cheo Kelly MD, PhD  Attending Physician  Division of Infectious Diseases  Department of Medicine    Please contact through MS Teams message.  Office: [phone] (after 5 PM or weekend) .

## 2024-01-31 NOTE — PROGRESS NOTE ADULT - SUBJECTIVE AND OBJECTIVE BOX
Aashish Boyer MD  Interventional Cardiology / Endovascular Specialist  [place] Office : [phone] [place] [id number] Tel:   [place] Office : [phone] [facility] [address]  Tel: [phone]      Subjective/Overnight events: Pt is lying in bed intubated    	  MEDICATIONS:  aspirin  chewable 81 milliGRAM(s) Enteral Tube daily  heparin   Injectable 5000 Unit(s) SubCutaneous every 8 hours    meropenem  IVPB 1000 milliGRAM(s) IV Intermittent every 12 hours    albuterol/ipratropium for Nebulization 3 milliLiter(s) Nebulizer every 6 hours  sodium chloride 3%  Inhalation 4 milliLiter(s) Inhalation every 6 hours    dexMEDEtomidine Infusion 0.2 MICROgram(s)/kG/Hr IV Continuous <Continuous>  escitalopram 10 milliGRAM(s) Oral daily  levETIRAcetam  IVPB 250 milliGRAM(s) IV Intermittent every 12 hours    pantoprazole  Injectable 40 milliGRAM(s) IV Push every 12 hours  sucralfate suspension 1 Gram(s) Enteral Tube two times a day    dextrose 50% Injectable 25 Gram(s) IV Push once  dextrose 50% Injectable 25 Gram(s) IV Push once  dextrose 50% Injectable 12.5 Gram(s) IV Push once  dextrose Oral Gel 15 Gram(s) Oral once  glucagon  Injectable 1 milliGRAM(s) IntraMuscular once  insulin lispro (ADMELOG) corrective regimen sliding scale   SubCutaneous every 6 hours  insulin NPH human recombinant 6 Unit(s) SubCutaneous every 6 hours    artificial  tears Solution 1 Drop(s) Left EYE four times a day  chlorhexidine 2% Cloths 1 Application(s) Topical daily  lidocaine   4% Patch 1 Patch Transdermal every 24 hours  petrolatum Ophthalmic Ointment 1 Application(s) Left EYE at bedtime      PAST MEDICAL/SURGICAL HISTORY  PAST MEDICAL & SURGICAL HISTORY:  Hyperlipemia      Hypertension      Coronary Artery Disease      Diabetes Mellitus Type II      Stented Coronary Artery  total 5 stents, last stent 5/2019      Neuropathy      Myocardial infarction      Stroke  mild left facial numbness   no other residuals verbalized      Myoclonic jerking      Stage 3 chronic kidney disease      History of Cataract Extraction      Hx of CABG      H/O coronary angiogram      S/P coronary artery stent placement  1/6/09      S/P placement of cardiac pacemaker          SOCIAL HISTORY: Substance Use (street drugs): ( x ) never used  (  ) other:    FAMILY HISTORY:  No pertinent family history in first degree relatives          PHYSICAL EXAM:  T(C): 37.4 (01-31-24 @ 12:00), Max: 37.4 (01-31-24 @ 12:00)  HR: 106 (01-31-24 @ 15:00) (88 - 134)  BP: 147/78 (01-31-24 @ 15:00) (92/71 - 165/76)  RR: 18 (01-31-24 @ 15:00) (15 - 22)  SpO2: 97% (01-31-24 @ 15:00) (87% - 100%)  Wt(kg): --  I&O's Summary    30 Jan 2024 07:01  -  31 Jan 2024 07:00  --------------------------------------------------------  IN: 793.3 mL / OUT: 1780 mL / NET: -986.7 mL          GENERAL: s/p extubation   EYES:  conjunctiva and sclera clear  ENMT: No tonsillar erythema, exudates, or enlargement  Cardiovascular: Normal S1 S2, No JVD, No murmurs, No edema  Respiratory: Lungs clear to auscultation	  Gastrointestinal:  Soft,   Extremities: No edema                                     7.9    15.56 )-----------( 332      ( 31 Jan 2024 08:13 )             26.4     01-31    147<H>  |  108<H>  |  38<H>  ----------------------------<  211<H>  4.6   |  25  |  1.64<H>    Ca    8.1<L>      31 Jan 2024 08:13  Phos  5.0     01-31  Mg     2.40     01-31    TPro  6.3  /  Alb  2.3<L>  /  TBili  0.3  /  DBili  x   /  AST  29  /  ALT  20  /  AlkPhos  68  01-31    proBNP:   Lipid Profile:   HgA1c:   TSH:     Consultant(s) Notes Reviewed:  [x ] YES  [ ] NO    Care Discussed with Consultants/Other Providers [ x] YES  [ ] NO    Imaging Personally Reviewed independently:  [x] YES  [ ] NO    All labs, radiologic studies, vitals, orders and medications list reviewed. Patient is seen and examined at bedside. Case discussed with medical team.

## 2024-01-31 NOTE — PROGRESS NOTE ADULT - ASSESSMENT
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     EKG SR RBBB (old per family)     1) Hypoxia   - s/p bronch   - transferred to MICU intubated no ws?p extubation on HFNC  - Rt pleural effusion , Volume up on exam   - recommend IV diuresis Lasix 40 IV daily, received dose on 1/30    2) CAD s/p CABG  -p/w chest pain. EKG non ischemic , trop -sera   - echo with normal lv and moderate AS   - c/w metoprolol   - chest pains  Trop mildly elevated and trending down likely sec to kidney disease,   -holding brilinta, was on it for SMA stent placed in 12/2023, held 2/2 anticipation for PEG placement, restart when no further invasive procedures planned    3) Covid +  - s/p remdesivir   - c/w supportive management   - t/t per primary team     4) Abd distension   -CTA AP obtained which showed  partially occlusive calcified and non-calcified plaque just distal to take-off of the SMA, with estimated at least 75% luminal narrowing. Limited evaluation of its distal branches. 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     EKG SR RBBB (old per family)     1) Hypoxia   - s/p bronch   - transferred to MICU intubated no ws?p extubation on HFNC  - Rt pleural effusion ,  - hold lasix given Hypernatremia and worsening creat    2) CAD s/p CABG  -p/w chest pain. EKG non ischemic , trop -sera   - echo with normal lv and moderate AS   - c/w metoprolol   - chest pains  Trop mildly elevated and trending down likely sec to kidney disease,   -holding brilinta, was on it for SMA stent placed in 12/2023, held 2/2 anticipation for PEG placement, restart when no further invasive procedures planned    3) Covid +  - s/p remdesivir   - c/w supportive management   - t/t per primary team     4) Abd distension   -CTA AP obtained which showed  partially occlusive calcified and non-calcified plaque just distal to take-off of the SMA, with estimated at least 75% luminal narrowing. Limited evaluation of its distal branches. 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   -on Protonix IV q 12

## 2024-01-31 NOTE — CONSULT NOTE ADULT - ATTENDING COMMENTS
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    Sudden worsening neurological status with focal findings.   Per nurse - eyes open, moans at times, no tracking, moves arms symmetrically.  and minimal movement in legs.  Off sedation since 1/31 at 19:30.    Exam:  MS: Eyes open to stimulation.  No fix and follow.  No attempts at verbalizations.  No follow commands. Localizes with B arms in response to supraorbital pressure.  CN:  No BTT.  Eyes in right gaze, no left gaze.  Corneal reflex is intact and symmetric.  Eyes close symmetrically and strongly.  Pupils round,  2mm-->1mm, B.    Myoclonus? - left head turning.      Motor/sensory:  Tone:  Legs w/d sym and purposefully to nailbed pressure:     Reflexes:  Bi and Tri 1.  Others 0. (he has h/o severe polyneuropathy).  Upgoing vs. W/D to plantar stimulation.     < from: CT Angio Neck w/ IV Cont (01.31.24 @ 21:00) >    1. No CT evidence ofan acute territorial infarct or hemorrhage, with   underlying volume loss. No hemorrhage or mass identified.  2. Extensive calcified plaque in the head and neck.  3. Moderate to severe narrowing left internal carotid at the origin.   Severe narrowingright internal carotid by a tandem lesion 1.5 cm above   the bifurcation with a narrowed internal carotid beyond the lesion.  4. Extensive calcified plaque both cavernous and supraclinoid carotid   arteries with narrowing but no occlusion. Mild narrowing proximal right   M1 segment. Moderate narrowing both vertebral V4 segments  5. No perfusion abnormality at the Tmax 6 second threshold, but moderate   hypoperfusion in the right MCA territory at the 4 second threshold    < end of copied text >        A/P  Mr. Foss is a 89 yo man with sudden worsening neurological status with focal findings.  Intracranial and extracranial atherosclerosis.   The differential diagnosis includes: seizure, stroke, both seizure and stroke, toxic metabolic septic encephalopathy.  Continuous EEG.  Aspirin  Statin  Continue levetiracetam 500 mg Q12H  D/W family.   Thank you    There is a high probability of sudden, clinically significant, or life threatening deterioration in the patient’s condition which requires the highest level of physician preparedness to intervene urgently.  Critical care time:  55 minutes.

## 2024-01-31 NOTE — CONSULT NOTE ADULT - ASSESSMENT
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 Hospitalization with CTA abdomen demonstrated mesenteric fat stranding associated with ascending/transverse colon and suggestive of acute cholecystitis (12/24/23). Patient went to the OR and is S/p SMA angiography with stent placement with diagnostic laparoscopy 12/24, small bowel and visible colon viable, some inflammation of omentum in RUQ. Patient went to SICU post op for monitoring, patient was treated for covid with remdesivir. 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 He was transferred to the floor on 1/5. During admission patient seen by medicine who recommended b/l LE duplex, and cardiology for chest pain on admission. Patients EKG non ischemic , trop -sera  - echo with normal left ventricle and moderate AS -EF 62%. Pt was treated for aspiration pneumonia during admission as well.  LUE hematoma noted 1/10 on venous Duplex, w/ visualization of complex, avascular fluid collection (5.3 x 2.7 x 3.7 cm), possibly hematoma. AMS noted on 1/16, w/ CT head finding suggestive of partial opacification of the L middle ear and mastoid air cells. CT chest on 1/16 revealed new small b/l pleural effusions a/w RML and RLL collapse. Patchy b/l GGO, c/f pulm edema. S/p BAL 1/19, revealing NRF w/ Staph aureus. Restarted on Zosyn 1/20. Repeat BAL 1/22 w/ no growth. Perc cholecystectomy on 1/26.    He was taken off brilinta several days ago for a planned pleural tap of lung effusions/GI had plans for PEG tube placement (not confirmed yet). Pt was intubated 3 days ago and since extubation yesterday it has been noted that pt has been completely "aphasic"- nonverbal and not following commands for which neurology has been consulted. STANLEY 7:30 PM 1/30/24. Also, it has been noted that there has been less withdrawl in the RLE compared to the LLE and that he has been looking more towards the right side than his left side and has been having "myoclonic jerks" in his shoulders with some "posturing in his arms". Prior to extubation, he was following commands. At baseline, prior to hospital admission pt was using a cane but independent of ADLs.     NIH  preMRS 1    Impression:    Recommendations:     To be discussed with neurology attending and will be formally staffed by attending during morning rounds. Recommendations will be finalized once signed by attending.  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 Hospitalization with CTA abdomen demonstrated mesenteric fat stranding associated with ascending/transverse colon and suggestive of acute cholecystitis (12/24/23). Patient went to the OR and is S/p SMA angiography with stent placement with diagnostic laparoscopy 12/24, small bowel and visible colon viable, some inflammation of omentum in RUQ. Patient went to SICU post op for monitoring, patient was treated for covid with remdesivir. 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 He was transferred to the floor on 1/5. During admission patient seen by medicine who recommended b/l LE duplex, and cardiology for chest pain on admission. Patients EKG non ischemic , trop -sera  - echo with normal left ventricle and moderate AS -EF 62%. Pt was treated for aspiration pneumonia during admission as well.  LUE hematoma noted 1/10 on venous Duplex, w/ visualization of complex, avascular fluid collection (5.3 x 2.7 x 3.7 cm), possibly hematoma. AMS noted on 1/16, w/ CT head finding suggestive of partial opacification of the L middle ear and mastoid air cells. CT chest on 1/16 revealed new small b/l pleural effusions a/w RML and RLL collapse. Patchy b/l GGO, c/f pulm edema. S/p BAL 1/19, revealing NRF w/ Staph aureus. Restarted on Zosyn 1/20. Repeat BAL 1/22 w/ no growth. Perc cholecystectomy on 1/26.    He was taken off brilinta several days ago for a planned pleural tap of lung effusions/GI had plans for PEG tube placement (not confirmed yet). Pt was intubated 3 days ago and since extubation yesterday it has been noted that pt has been completely "aphasic"- nonverbal and not following commands for which neurology has been consulted. STANLEY 7:30 PM 1/30/24. Also, it has been noted that there has been less withdrawl in the RLE compared to the LLE and that he has been looking more towards the right side than his left side and has been having "myoclonic jerks" in his shoulders with some "posturing in his arms". Prior to extubation, he was following commands. At baseline, prior to hospital admission pt was using a cane but independent of ADLs.     NIH  preMRS 1    Impression:    Recommendations:   Imaging:  -MRI brain w/o contrast, if no contraindications  -TTE w/ bubble study  - Video EEG  -Can consider STAT CT head non-contrast for any change in neuro exam    Secondary prevention of stroke:  -Continue home 81 mg ASA daily (rectal 325 mg ASA if dysphagia fails) if no contraindication.   -Permissive HTN up to 220/110 for 24 hours from symptom onset, gradual normotension over 2-3 days  -Atorvastatin 80 mg daily (long-term goal LDL < 70)  -Tight glucose control (long-term goal HgbA1c < 6%)    Misc/additional recs:  -Continue home keppra 250 mg BID for now  -toxic/metabolic/infectious w/u per primary team  -Dysphagia screen  -Speech and swallow eval if dysphagia screen fails  -NPO except meds until dysphagia screen passed  -Head of bed > 30 degrees for aspiration prevention   -Continuous  telemetry to monitor for arrhythmia  -Stroke labwork: HgbA1C, fasting lipid panel, CBC, CMP, coag pannel, troponin  - Baseline EKG  -Neuro checks Q4  -PT/OT  -DVT Prophylaxis: Lovenox 40 mg Sq daily unless contraindicated in which case SCDs   Fall precautions, aspiration precaution    To be discussed with neurology attending and will be formally staffed by attending during morning rounds. Recommendations will be finalized once signed by attending.  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 Hospitalization with CTA abdomen demonstrated mesenteric fat stranding associated with ascending/transverse colon and suggestive of acute cholecystitis (12/24/23). Patient went to the OR and is S/p SMA angiography with stent placement with diagnostic laparoscopy 12/24, small bowel and visible colon viable, some inflammation of omentum in RUQ. Patient went to SICU post op for monitoring, patient was treated for covid with remdesivir. 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 He was transferred to the floor on 1/5. During admission patient seen by medicine who recommended b/l LE duplex, and cardiology for chest pain on admission. Patients EKG non ischemic , trop -sera  - echo with normal left ventricle and moderate AS -EF 62%. Pt was treated for aspiration pneumonia during admission as well.  LUE hematoma noted 1/10 on venous Duplex, w/ visualization of complex, avascular fluid collection (5.3 x 2.7 x 3.7 cm), possibly hematoma. AMS noted on 1/16, w/ CT head finding suggestive of partial opacification of the L middle ear and mastoid air cells. CT chest on 1/16 revealed new small b/l pleural effusions a/w RML and RLL collapse. Patchy b/l GGO, c/f pulm edema. S/p BAL 1/19, revealing NRF w/ Staph aureus. Restarted on Zosyn 1/20. Repeat BAL 1/22 w/ no growth. Perc cholecystectomy on 1/26.    He was taken off brilinta several days ago for a planned pleural tap of lung effusions/GI had plans for PEG tube placement (not confirmed yet). Pt was intubated 3 days ago and since extubation yesterday it has been noted that pt has been completely "aphasic"- nonverbal and not following commands for which neurology has been consulted. LKW 7:30 PM 1/30/24. Also, it has been noted that there is a R facial droop and pt has been having less movements on the LUE/LLE and pt has been looking towards the right side more than his left side, and has been having "myoclonic jerks" in his shoulders with some extended and inward "posturing in his arms". Prior to extubation, he was following commands. At baseline, prior to hospital admission pt was using a cane but independent of ADLs. Sedation stopped at 7:30 PM today.    NIH 25  preMRS 1    Impression: AMS with reported aphasia with reported decreased movements on L side and R facial droop after extubation of unclear etiology at this time. Possibly in the setting of toxic/metabolic/infectious processes but cannot rule out seizure like activity or stroke at this time.     Recommendations:   Imaging:  -F/U CT/CTA/CTP ordered by primary team  -MRI brain w/o contrast, if no contraindications  -TTE w/ bubble study  - Video EEG  -Can consider STAT CT head non-contrast for any change in neuro exam    Secondary prevention of stroke:  -Continue home 81 mg ASA daily (rectal 325 mg ASA if dysphagia fails) if no contraindication.   -Permissive HTN up to 220/110 for 24 hours from symptom onset, gradual normotension over 2-3 days  -Atorvastatin 80 mg daily (long-term goal LDL < 70)  -Tight glucose control (long-term goal HgbA1c < 6%)    Misc/additional recs:  -Continue home keppra 250 mg BID for now  -toxic/metabolic/infectious w/u per primary team  -Dysphagia screen  -Speech and swallow eval if dysphagia screen fails  -NPO except meds until dysphagia screen passed  -Head of bed > 30 degrees for aspiration prevention   -Continuous  telemetry to monitor for arrhythmia  -Stroke labwork: HgbA1C, fasting lipid panel, CBC, CMP, coag pannel, troponin  - Baseline EKG  -Neuro checks Q4  -PT/OT  -DVT Prophylaxis: Lovenox 40 mg Sq daily unless contraindicated in which case SCDs   Fall precautions, aspiration precaution    To be discussed with neurology attending and will be formally staffed by attending during morning rounds. Recommendations will be finalized once signed by attending.  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 Hospitalization with CTA abdomen demonstrated mesenteric fat stranding associated with ascending/transverse colon and suggestive of acute cholecystitis (12/24/23). Patient went to the OR and is S/p SMA angiography with stent placement with diagnostic laparoscopy 12/24, small bowel and visible colon viable, some inflammation of omentum in RUQ. Patient went to SICU post op for monitoring, patient was treated for covid with remdesivir. 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 He was transferred to the floor on 1/5. During admission patient seen by medicine who recommended b/l LE duplex, and cardiology for chest pain on admission. Patients EKG non ischemic , trop -sera  - echo with normal left ventricle and moderate AS -EF 62%. Pt was treated for aspiration pneumonia during admission as well.  LUE hematoma noted 1/10 on venous Duplex, w/ visualization of complex, avascular fluid collection (5.3 x 2.7 x 3.7 cm), possibly hematoma. AMS noted on 1/16, w/ CT head finding suggestive of partial opacification of the L middle ear and mastoid air cells. CT chest on 1/16 revealed new small b/l pleural effusions a/w RML and RLL collapse. Patchy b/l GGO, c/f pulm edema. S/p BAL 1/19, revealing NRF w/ Staph aureus. Restarted on Zosyn 1/20. Repeat BAL 1/22 w/ no growth. Perc cholecystectomy on 1/26.    He was taken off brilinta several days ago for a planned pleural tap of lung effusions/GI had plans for PEG tube placement (not confirmed yet). Pt was intubated 3 days ago and since extubation yesterday it has been noted that pt has been completely "aphasic"- nonverbal and not following commands for which neurology has been consulted. LKW 7:30 PM 1/30/24. Also, it has been noted that there is a R facial droop and pt has been having less movements on the LUE/LLE and pt has been looking towards the right side more than his left side, and has been having "myoclonic jerks" in his shoulders with some extended and inward "posturing in his arms". Prior to extubation, he was following commands. At baseline, prior to hospital admission pt was using a cane but independent of ADLs. Sedation stopped at 7:30 PM today.    NIH 25  preMRS 1    Impression: AMS with reported aphasia with reported decreased movements on L side and R facial droop after extubation of unclear etiology at this time. Possibly in the setting of toxic/metabolic/infectious processes but cannot rule out seizure like activity or stroke at this time.     Recommendations:   Imaging:  -F/U CT/CTA/CTP ordered by primary team  -MRI brain w/o contrast, if no contraindications  -TTE w/ bubble study  - Video EEG  -Can consider STAT CT head non-contrast for any change in neuro exam    Secondary prevention of stroke:  -Continue home 81 mg ASA daily (rectal 325 mg ASA if dysphagia fails) if no contraindication.   -Permissive HTN up to 220/110 for 24 hours from symptom onset, gradual normotension over 2-3 days  -Atorvastatin 80 mg daily (long-term goal LDL < 70)  -Tight glucose control (long-term goal HgbA1c < 6%)    Misc/additional recs:  -keppra 1 gram IV loading bolus now and then can increase keppra 500 mg BID  -toxic/metabolic/infectious w/u per primary team  -Dysphagia screen  -Speech and swallow eval if dysphagia screen fails  -NPO except meds until dysphagia screen passed  -Head of bed > 30 degrees for aspiration prevention   -Continuous  telemetry to monitor for arrhythmia  -Stroke labwork: HgbA1C, fasting lipid panel, CBC, CMP, coag pannel, troponin  - Baseline EKG  -Neuro checks Q4  -PT/OT  -DVT Prophylaxis: Lovenox 40 mg Sq daily unless contraindicated in which case SCDs   Fall precautions, aspiration precaution    To be discussed with neurology attending and will be formally staffed by attending during morning rounds. Recommendations will be finalized once signed by attending.  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 Hospitalization with CTA abdomen demonstrated mesenteric fat stranding associated with ascending/transverse colon and suggestive of acute cholecystitis (12/24/23). Patient went to the OR and is S/p SMA angiography with stent placement with diagnostic laparoscopy 12/24, small bowel and visible colon viable, some inflammation of omentum in RUQ. Patient went to SICU post op for monitoring, patient was treated for covid with remdesivir. 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 He was transferred to the floor on 1/5. During admission patient seen by medicine who recommended b/l LE duplex, and cardiology for chest pain on admission. Patients EKG non ischemic , trop -sera  - echo with normal left ventricle and moderate AS -EF 62%. Pt was treated for aspiration pneumonia during admission as well.  LUE hematoma noted 1/10 on venous Duplex, w/ visualization of complex, avascular fluid collection (5.3 x 2.7 x 3.7 cm), possibly hematoma. AMS noted on 1/16, w/ CT head finding suggestive of partial opacification of the L middle ear and mastoid air cells. CT chest on 1/16 revealed new small b/l pleural effusions a/w RML and RLL collapse. Patchy b/l GGO, c/f pulm edema. S/p BAL 1/19, revealing NRF w/ Staph aureus. Restarted on Zosyn 1/20. Repeat BAL 1/22 w/ no growth. Perc cholecystectomy on 1/26.    He was taken off brilinta several days ago for a planned pleural tap of lung effusions/GI had plans for PEG tube placement (not confirmed yet). Pt was intubated 3 days ago and since extubation yesterday it has been noted that pt has been completely "aphasic"- nonverbal and not following commands for which neurology has been consulted. LKW 7:30 PM 1/30/24. Also, it has been noted that there is a R facial droop and pt has been having less movements on the LUE/LLE and pt has been looking towards the right side more than his left side, and has been having "myoclonic jerks" in his shoulders with some extended and inward "posturing in his arms". Prior to extubation, he was following commands. At baseline, prior to hospital admission pt was using a cane but independent of ADLs. Sedation stopped at 7:30 PM today.    NIH 25  preMRS 1    Impression: AMS with reported aphasia with reported decreased movements on L side and R facial droop after extubation of unclear etiology at this time. Possibly in the setting of toxic/metabolic/infectious processes but cannot rule out seizure like activity or stroke at this time.     Recommendations:   Imaging:  -F/U CT/CTA/CTP ordered by primary team  -MRI brain w/o contrast, if no contraindications  -TTE w/ bubble study  - Video EEG  -Can consider STAT CT head non-contrast for any change in neuro exam    Secondary prevention of stroke:  -Continue home 81 mg ASA daily (rectal 325 mg ASA if dysphagia fails) if no contraindication.   -Permissive HTN up to 220/110 for 24 hours from symptom onset, gradual normotension over 2-3 days  -Atorvastatin 80 mg daily (long-term goal LDL < 70)  -Tight glucose control (long-term goal HgbA1c < 6%)    Misc/additional recs:  -keppra 1 gram IV loading bolus now and then can increase keppra 500 mg BID  -toxic/metabolic/infectious w/u per primary team  -Dysphagia screen  -Speech and swallow eval if dysphagia screen fails  -NPO except meds until dysphagia screen passed  -Head of bed > 30 degrees for aspiration prevention   -Continuous  telemetry to monitor for arrhythmia  -Stroke labwork: HgbA1C, fasting lipid panel, CBC, CMP, coag pannel, troponin  - Baseline EKG  -Neuro checks Q4  -PT/OT  -DVT Prophylaxis: Lovenox 40 mg Sq daily unless contraindicated in which case SCDs   Fall precautions, aspiration precaution    Case discussed with epilepsy fellow. To be discussed with neurology attending and will be formally staffed by attending during morning rounds. Recommendations will be finalized once signed by attending.  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 Hospitalization with CTA abdomen demonstrated mesenteric fat stranding associated with ascending/transverse colon and suggestive of acute cholecystitis (12/24/23). Patient went to the OR and is S/p SMA angiography with stent placement with diagnostic laparoscopy 12/24, small bowel and visible colon viable, some inflammation of omentum in RUQ. Patient went to SICU post op for monitoring, patient was treated for covid with remdesivir. 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 He was transferred to the floor on 1/5. During admission patient seen by medicine who recommended b/l LE duplex, and cardiology for chest pain on admission. Patients EKG non ischemic , trop -sera  - echo with normal left ventricle and moderate AS -EF 62%. Pt was treated for aspiration pneumonia during admission as well.  LUE hematoma noted 1/10 on venous Duplex, w/ visualization of complex, avascular fluid collection (5.3 x 2.7 x 3.7 cm), possibly hematoma. AMS noted on 1/16, w/ CT head finding suggestive of partial opacification of the L middle ear and mastoid air cells. CT chest on 1/16 revealed new small b/l pleural effusions a/w RML and RLL collapse. Patchy b/l GGO, c/f pulm edema. S/p BAL 1/19, revealing NRF w/ Staph aureus. Restarted on Zosyn 1/20. Repeat BAL 1/22 w/ no growth. Perc cholecystectomy on 1/26.    He was taken off brilinta several days ago for a planned pleural tap of lung effusions/GI had plans for PEG tube placement (not confirmed yet). Pt was intubated 3 days ago and since extubation yesterday it has been noted that pt has been completely "aphasic"- nonverbal and not following commands for which neurology has been consulted. LKW 7:30 PM 1/30/24. Also, it has been noted that there is a R facial droop and pt has been having less movements on the LUE/LLE and pt has been looking towards the right side more than his left side, and has been having "myoclonic jerks" in his shoulders with some extended and inward "posturing in his arms". Prior to extubation, he was following commands. At baseline, prior to hospital admission pt was using a cane but independent of ADLs. Sedation stopped at 7:30 PM today.    NIH 25  preMRS 1    Impression: AMS with reported aphasia with reported decreased movements on L side and R facial droop after extubation of unclear etiology at this time. Possibly in the setting of toxic/metabolic/infectious processes but cannot rule out seizure like activity or stroke at this time. Given that pt was taken off brilinta a few days ago, there is a potential higher risk for cardioembolic brain strokes.     Recommendations:   Imaging:  -F/U CT/CTA/CTP ordered by primary team  -MRI brain w/o contrast, if no contraindications  -TTE w/ bubble study  - Video EEG  -Can consider STAT CT head non-contrast for any change in neuro exam    Secondary prevention of stroke:  -Continue home 81 mg ASA daily (rectal 325 mg ASA if dysphagia fails) if no contraindication.   -Permissive HTN up to 220/110 for 24 hours from symptom onset, gradual normotension over 2-3 days  -Atorvastatin 80 mg daily (long-term goal LDL < 70)  -Tight glucose control (long-term goal HgbA1c < 6%)    Misc/additional recs:  -keppra 1 gram IV loading bolus now and then can increase keppra 500 mg BID  -toxic/metabolic/infectious w/u per primary team  -Dysphagia screen  -Speech and swallow eval if dysphagia screen fails  -NPO except meds until dysphagia screen passed  -Head of bed > 30 degrees for aspiration prevention   -Continuous  telemetry to monitor for arrhythmia  -Stroke labwork: HgbA1C, fasting lipid panel, CBC, CMP, coag pannel, troponin  - Baseline EKG  -Neuro checks Q4  -PT/OT  -DVT Prophylaxis: Lovenox 40 mg Sq daily unless contraindicated in which case SCDs   Fall precautions, aspiration precaution    Case discussed with epilepsy fellow. To be discussed with neurology attending and will be formally staffed by attending during morning rounds. Recommendations will be finalized once signed by attending.  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 Hospitalization with CTA abdomen demonstrated mesenteric fat stranding associated with ascending/transverse colon and suggestive of acute cholecystitis (12/24/23). Patient went to the OR and is S/p SMA angiography with stent placement with diagnostic laparoscopy 12/24, small bowel and visible colon viable, some inflammation of omentum in RUQ. Patient went to SICU post op for monitoring, patient was treated for covid with remdesivir. 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 He was transferred to the floor on 1/5. During admission patient seen by medicine who recommended b/l LE duplex, and cardiology for chest pain on admission. Patients EKG non ischemic , trop -sera  - echo with normal left ventricle and moderate AS -EF 62%. Pt was treated for aspiration pneumonia during admission as well.  LUE hematoma noted 1/10 on venous Duplex, w/ visualization of complex, avascular fluid collection (5.3 x 2.7 x 3.7 cm), possibly hematoma. AMS noted on 1/16, w/ CT head finding suggestive of partial opacification of the L middle ear and mastoid air cells. CT chest on 1/16 revealed new small b/l pleural effusions a/w RML and RLL collapse. Patchy b/l GGO, c/f pulm edema. S/p BAL 1/19, revealing NRF w/ Staph aureus. Restarted on Zosyn 1/20. Repeat BAL 1/22 w/ no growth. Perc cholecystectomy on 1/26.    He was taken off brilinta several days ago for a planned pleural tap of lung effusions/GI had plans for PEG tube placement (not confirmed yet). Pt was intubated 3 days ago and since extubation yesterday it has been noted that pt has been completely "aphasic"- nonverbal and not following commands for which neurology has been consulted. LKW 7:30 PM 1/30/24. Also, it has been noted that there is a R facial droop and pt has been having less movements on the LUE/LLE and pt has been looking towards the right side more than his left side, and has been having "myoclonic jerks" in his shoulders with some extended and inward "posturing in his arms". Prior to extubation, he was following commands. At baseline, prior to hospital admission pt was using a cane but independent of ADLs. Sedation stopped at 7:30 PM today.    NIH 25  preMRS 1    Impression: AMS with reported aphasia with reported decreased movements on L side and R facial droop after extubation of unclear etiology at this time. Possibly in the setting of toxic/metabolic/infectious processes but cannot rule out seizure like activity or stroke at this time. Given that pt was taken off brilinta a few days ago, there is a potential higher risk for cardioembolic brain strokes.     Recommendations:   Imaging:  -F/U CT/CTA/CTP  -MRI brain w/o contrast, if no contraindications  -TTE w/ bubble study  - Video EEG  -Can consider STAT CT head non-contrast for any change in neuro exam    Secondary prevention of stroke:  -Continue home 81 mg ASA daily (rectal 325 mg ASA if dysphagia fails) if no contraindication.   -Permissive HTN up to 220/110 for 24 hours from symptom onset, gradual normotension over 2-3 days  -Atorvastatin 80 mg daily (long-term goal LDL < 70)  -Tight glucose control (long-term goal HgbA1c < 6%)    Misc/additional recs:  -keppra 1 gram IV loading bolus now and then can increase keppra 500 mg BID  -toxic/metabolic/infectious w/u per primary team  -If not done, can consider serum: TSH, Folate/B12, B1, B2, B5, B6, B12, MMA, homocysteine, Vit E, ammonia,   -Dysphagia screen  -Speech and swallow eval if dysphagia screen fails  -NPO except meds until dysphagia screen passed  -Head of bed > 30 degrees for aspiration prevention   -Continuous  telemetry to monitor for arrhythmia  -Stroke labwork: HgbA1C, fasting lipid panel, CBC, CMP, coag pannel, troponin  - Baseline EKG  -Neuro checks Q4  -PT/OT  -DVT Prophylaxis: Lovenox 40 mg Sq daily unless contraindicated in which case SCDs   Fall precautions, aspiration precaution  -Measures to reduce delirium: frequent reorientation, maintain sleep/wake routine, avoid sleeping during the day, keep lights on during the day, use of hearing aids or glasses if patient needs them, regular toileting.     Case discussed with epilepsy fellow. To be discussed with neurology attending and will be formally staffed by attending during morning rounds. Recommendations will be finalized once signed by attending.    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 Hospitalization with CTA abdomen demonstrated mesenteric fat stranding associated with ascending/transverse colon and suggestive of acute cholecystitis (12/24/23). Patient went to the OR and is S/p SMA angiography with stent placement with diagnostic laparoscopy 12/24, small bowel and visible colon viable, some inflammation of omentum in RUQ. Patient went to SICU post op for monitoring, patient was treated for covid with remdesivir. 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 He was transferred to the floor on 1/5. During admission patient seen by medicine who recommended b/l LE duplex, and cardiology for chest pain on admission. Patients EKG non ischemic , trop -srea  - echo with normal left ventricle and moderate AS -EF 62%. Pt was treated for aspiration pneumonia during admission as well.  LUE hematoma noted 1/10 on venous Duplex, w/ visualization of complex, avascular fluid collection (5.3 x 2.7 x 3.7 cm), possibly hematoma. AMS noted on 1/16, w/ CT head finding suggestive of partial opacification of the L middle ear and mastoid air cells. CT chest on 1/16 revealed new small b/l pleural effusions a/w RML and RLL collapse. Patchy b/l GGO, c/f pulm edema. S/p BAL 1/19, revealing NRF w/ Staph aureus. Restarted on Zosyn 1/20. Repeat BAL 1/22 w/ no growth. Perc cholecystectomy on 1/26.    He was taken off brilinta several days ago for a planned pleural tap of lung effusions/GI had plans for PEG tube placement (not confirmed yet). Pt was intubated 3 days ago and since extubation yesterday it has been noted that pt has been completely "aphasic"- nonverbal and not following commands for which neurology has been consulted. LKW 7:30 PM 1/30/24. Also, it has been noted that there is a R facial droop and pt has been having less movements on the LUE/LLE and pt has been looking towards the right side more than his left side, and has been having "myoclonic jerks" in his shoulders with some extended and inward "posturing in his arms". Prior to extubation, he was following commands. At baseline, prior to hospital admission pt was using a cane but independent of ADLs. Sedation stopped at 7:30 PM today.    NIH 25  preMRS 1    Impression: AMS with reported aphasia with reported decreased movements on L side and R facial droop after extubation of unclear etiology at this time. Possibly in the setting of toxic/metabolic/infectious processes but cannot rule out seizure like activity or stroke at this time. Given that pt was taken off brilinta a few days ago, there is a potential higher risk for cardioembolic brain strokes.     Recommendations:   Imaging:  -MRI brain w/o contrast, if no contraindications  -MRA head w/o contrast, MRA neck with contrast  -TTE w/ bubble study  - Video EEG  -Can consider STAT CT head non-contrast for any change in neuro exam    Secondary prevention of stroke:  -Continue home 81 mg ASA daily (rectal 325 mg ASA if dysphagia fails) if no contraindication.   -Permissive HTN up to 220/110 for 24 hours from symptom onset, gradual normotension over 2-3 days  -Atorvastatin 80 mg daily (long-term goal LDL < 70)  -Tight glucose control (long-term goal HgbA1c < 6%)    Misc/additional recs:  -keppra 1 gram IV loading bolus now and then can increase keppra 500 mg BID  -toxic/metabolic/infectious w/u per primary team  -If not done, can consider serum: TSH, Folate/B12, B1, B2, B5, B6, B12, MMA, homocysteine, Vit E, ammonia,   -Dysphagia screen  -Speech and swallow eval if dysphagia screen fails  -NPO except meds until dysphagia screen passed  -Head of bed > 30 degrees for aspiration prevention   -Continuous  telemetry to monitor for arrhythmia  -Stroke labwork: HgbA1C, fasting lipid panel, CBC, CMP, coag pannel, troponin  - Baseline EKG  -Neuro checks Q4  -PT/OT  -DVT Prophylaxis: Lovenox 40 mg Sq daily unless contraindicated in which case SCDs   Fall precautions, aspiration precaution  -Measures to reduce delirium: frequent reorientation, maintain sleep/wake routine, avoid sleeping during the day, keep lights on during the day, use of hearing aids or glasses if patient needs them, regular toileting.     Case discussed with epilepsy fellow. To be discussed with neurology attending and will be formally staffed by attending during morning rounds. Recommendations will be finalized once signed by attending.    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 Hospitalization with CTA abdomen demonstrated mesenteric fat stranding associated with ascending/transverse colon and suggestive of acute cholecystitis (12/24/23). Patient went to the OR and is S/p SMA angiography with stent placement with diagnostic laparoscopy 12/24, small bowel and visible colon viable, some inflammation of omentum in RUQ. Patient went to SICU post op for monitoring, patient was treated for covid with remdesivir. 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 He was transferred to the floor on 1/5. During admission patient seen by medicine who recommended b/l LE duplex, and cardiology for chest pain on admission. Patients EKG non ischemic , trop -sera  - echo with normal left ventricle and moderate AS -EF 62%. Pt was treated for aspiration pneumonia during admission as well.  LUE hematoma noted 1/10 on venous Duplex, w/ visualization of complex, avascular fluid collection (5.3 x 2.7 x 3.7 cm), possibly hematoma. AMS noted on 1/16, w/ CT head finding suggestive of partial opacification of the L middle ear and mastoid air cells. CT chest on 1/16 revealed new small b/l pleural effusions a/w RML and RLL collapse. Patchy b/l GGO, c/f pulm edema. S/p BAL 1/19, revealing NRF w/ Staph aureus. Restarted on Zosyn 1/20. Repeat BAL 1/22 w/ no growth. Perc cholecystectomy on 1/26.    He was taken off brilinta several days ago for a planned pleural tap of lung effusions/GI had plans for PEG tube placement (not confirmed yet). Pt was intubated 3 days ago and since extubation yesterday it has been noted that pt has been completely "aphasic"- nonverbal and not following commands for which neurology has been consulted. LKW 7:30 PM 1/30/24. Also, it has been noted that there is a R facial droop and pt has been having less movements on the LUE/LLE and pt has been looking towards the right side more than his left side, and has been having "myoclonic jerks" in his shoulders with some extended and inward "posturing in his arms". Prior to extubation, he was following commands. At baseline, prior to hospital admission pt was using a cane but independent of ADLs. Sedation stopped at 7:30 PM today.    NIH 25  preMRS 1    Impression: AMS with reported aphasia with reported decreased movements on L side and R facial droop after extubation of unclear etiology at this time. Possibly in the setting of toxic/metabolic/infectious processes but cannot rule out seizure like activity or stroke at this time. Given that pt was taken off brilinta a few days ago, there is a potential higher risk for cardioembolic brain strokes.     Recommendations:   Imaging:  -MRI brain w/o contrast, if no contraindications  -MRA head w/o contrast, MRA neck with contrast  -TTE w/ bubble study  - Video EEG  -Can consider STAT CT head non-contrast for any change in neuro exam    Secondary prevention of stroke:  -Permissive HTN up to 220/110 for 24 hours from symptom onset, gradual normotension over 2-3 days  -Atorvastatin 80 mg daily (long-term goal LDL < 70)  -Tight glucose control (long-term goal HgbA1c < 6%)    Misc/additional recs:  -keppra 1 gram IV loading bolus now and then can increase keppra 500 mg BID  -toxic/metabolic/infectious w/u per primary team  -If not done, can consider serum: TSH, Folate/B12, B1, B2, B5, B6, B12, MMA, homocysteine, Vit E, ammonia,   -Dysphagia screen  -Speech and swallow eval if dysphagia screen fails  -NPO except meds until dysphagia screen passed  -Head of bed > 30 degrees for aspiration prevention   -Continuous  telemetry to monitor for arrhythmia  -Stroke labwork: HgbA1C, fasting lipid panel, CBC, CMP, coag pannel, troponin  - Baseline EKG  -Neuro checks Q4  -PT/OT  -DVT Prophylaxis: Lovenox 40 mg Sq daily unless contraindicated in which case SCDs   Fall precautions, aspiration precaution  -Measures to reduce delirium: frequent reorientation, maintain sleep/wake routine, avoid sleeping during the day, keep lights on during the day, use of hearing aids or glasses if patient needs them, regular toileting.     Case discussed with epilepsy fellow. To be discussed with neurology attending and will be formally staffed by attending during morning rounds. Recommendations will be finalized once signed by attending.

## 2024-01-31 NOTE — CONSULT NOTE ADULT - SUBJECTIVE AND OBJECTIVE BOX
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 Hospitalization with CTA abdomen demonstrated mesenteric fat stranding associated with ascending/transverse colon and suggestive of acute cholecystitis (12/24/23). Patient went to the OR and is S/p SMA angiography with stent placement with diagnostic laparoscopy 12/24, small bowel and visible colon viable, some inflammation of omentum in RUQ. Patient went to SICU post op for monitoring, patient was treated for covid with remdesivir. 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 He was transferred to the floor on 1/5. During admission patient seen by medicine who recommended b/l LE duplex, and cardiology for chest pain on admission. Patients EKG non ischemic , trop -sera  - echo with normal left ventricle and moderate AS -EF 62%. Pt was treated for aspiration pneumonia during admission as well.  LUE hematoma noted 1/10 on venous Duplex, w/ visualization of complex, avascular fluid collection (5.3 x 2.7 x 3.7 cm), possibly hematoma. AMS noted on 1/16, w/ CT head finding suggestive of partial opacification of the L middle ear and mastoid air cells. CT chest on 1/16 revealed new small b/l pleural effusions a/w RML and RLL collapse. Patchy b/l GGO, c/f pulm edema. S/p BAL 1/19, revealing NRF w/ Staph aureus. Restarted on Zosyn 1/20. Repeat BAL 1/22 w/ no growth. Perc cholecystectomy on 1/26.    He was taken off brilinta several days ago for a planned pleural tap of lung effusions/GI had plans for PEG tube placement (not confirmed yet). Pt was intubated 3 days ago and since extubation yesterday it has been noted that pt has been completely "aphasic"- nonverbal and not following commands for which neurology has been consulted. STANLEY 7:30 PM 1/30/24. Also, it has been noted that there has been less withdrawl in the RLE compared to the LLE and that he has been looking more towards the right side than his left side and has been having "myoclonic jerks" in his shoulders with some "posturing in his arms". Prior to extubation, he was following commands. At baseline, prior to hospital admission pt was using a cane but independent of ADLs.     NIH  preMRS 1    Review of Systems:   CONSTITUTIONAL: No fevers or chills  NEUROLOGICAL: +As stated in HPI above  SKIN: No itching, burning, rashes, or lesions   All other review of systems is negative unless indicated above.    Allergies:  fluoroquinolone antibiotics (Other)  Cipro (Unknown)  Tegretol (Unknown)  carbamazepine (Other)      PMHx/PSHx/Family Hx: As above, otherwise see below   Hyperlipemia    Hypertension    Coronary Artery Disease    Diabetes Mellitus Type II    Stented Coronary Artery    Neuropathy    Myocardial infarction    Stroke    Myoclonic jerking    Stage 3 chronic kidney disease        Social Hx:  No current use of tobacco, alcohol, or illicit drugs    Medications:  MEDICATIONS  (STANDING):  albuterol/ipratropium for Nebulization 3 milliLiter(s) Nebulizer every 6 hours  artificial  tears Solution 1 Drop(s) Left EYE four times a day  chlorhexidine 2% Cloths 1 Application(s) Topical daily  dexMEDEtomidine Infusion 0.2 MICROgram(s)/kG/Hr (3.97 mL/Hr) IV Continuous <Continuous>  dextrose 50% Injectable 25 Gram(s) IV Push once  dextrose 50% Injectable 25 Gram(s) IV Push once  dextrose 50% Injectable 12.5 Gram(s) IV Push once  dextrose Oral Gel 15 Gram(s) Oral once  escitalopram 10 milliGRAM(s) Oral daily  glucagon  Injectable 1 milliGRAM(s) IntraMuscular once  insulin lispro (ADMELOG) corrective regimen sliding scale   SubCutaneous every 6 hours  insulin NPH human recombinant 6 Unit(s) SubCutaneous every 6 hours  levETIRAcetam  IVPB 250 milliGRAM(s) IV Intermittent every 12 hours  lidocaine   4% Patch 1 Patch Transdermal every 24 hours  meropenem  IVPB 1000 milliGRAM(s) IV Intermittent every 12 hours  pantoprazole  Injectable 40 milliGRAM(s) IV Push every 12 hours  petrolatum Ophthalmic Ointment 1 Application(s) Left EYE at bedtime  sodium chloride 3%  Inhalation 4 milliLiter(s) Inhalation every 6 hours  sucralfate suspension 1 Gram(s) Enteral Tube two times a day    MEDICATIONS  (PRN):      Vitals:  T(C): 37.4 (01-31-24 @ 12:00), Max: 37.4 (01-31-24 @ 12:00)  HR: 105 (01-31-24 @ 18:00) (105 - 134)  BP: 141/84 (01-31-24 @ 18:00) (92/71 - 165/76)  RR: 16 (01-31-24 @ 18:00) (15 - 22)  SpO2: 95% (01-31-24 @ 18:00) (91% - 100%)    Physical Examination: INCOMPLETE  General - NAD  Cardiovascular - Peripheral pulses palpable, no edema  Eyes - Fundoscopy with flat, sharp optic discs and no hemorrhage or exudates; Fundoscopy not well visualized; Fundoscopy not performed due to safety precautions in the setting of the COVID-19 pandemic    Neurologic Exam:  Mental status - Awake, Alert, Oriented to person, place, and time. Speech fluent, repetition and naming intact. Follows simple and complex commands. Attention/concentration, recent and remote memory (including registration and recall), and fund of knowledge intact    Cranial nerves - PERRLA, VFF, EOMI, face sensation (V1-V3) intact b/l, facial strength intact without asymmetry b/l, hearing intact b/l, palate with symmetric elevation, trapezius OR sternocleidomastiod 5/5 strength b/l, tongue midline on protrusion with full lateral movement    Motor - Normal bulk and tone throughout. No pronator drift.  Strength testing            Deltoid      Biceps      Triceps     Wrist Extension    Wrist Flexion     Interossei         R            5                 5               5                     5                              5                        5                 5  L             5                 5               5                     5                              5                        5                 5              Hip Flexion    Hip Extension    Knee Flexion    Knee Extension    Dorsiflexion    Plantar Flexion  R              5                           5                       5                           5                            5                          5  L              5                           5                        5                           5                            5                          5    Sensation - Light touch/temperature OR pain/vibration intact throughout    DTR's -             Biceps      Triceps     Brachioradialis      Patellar    Ankle    Toes/plantar response  R             2+             2+                  2+                       2+            2+                 Down  L              2+             2+                 2+                        2+           2+                 Down    Coordination - Finger to Nose intact b/l. No tremors appreciated    Gait and station - Normal casual gait. Romberg (-)    Labs:                        7.9    15.56 )-----------( 332      ( 31 Jan 2024 08:13 )             26.4     01-31    147<H>  |  108<H>  |  38<H>  ----------------------------<  211<H>  4.6   |  25  |  1.64<H>    Ca    8.1<L>      31 Jan 2024 08:13  Phos  5.0     01-31  Mg     2.40     01-31    TPro  6.3  /  Alb  2.3<L>  /  TBili  0.3  /  DBili  x   /  AST  29  /  ALT  20  /  AlkPhos  68  01-31    CAPILLARY BLOOD GLUCOSE      POCT Blood Glucose.: 236 mg/dL (31 Jan 2024 18:20)    LIVER FUNCTIONS - ( 31 Jan 2024 08:13 )  Alb: 2.3 g/dL / Pro: 6.3 g/dL / ALK PHOS: 68 U/L / ALT: 20 U/L / AST: 29 U/L / GGT: x             PT/INR - ( 31 Jan 2024 00:50 )   PT: 12.5 sec;   INR: 1.12 ratio         PTT - ( 31 Jan 2024 00:50 )  PTT:31.2 sec  CSF:                  Radiology:  CT Head No Cont:  (25 Jan 2024 20:56)     Admission Date  23-Dec-2023 18:19  Reason for Admission  COVID19, Chest pain    A 89 year old with PMH of HTN, HLD, MI, CAD s/p CABG s/p stents (last stent May 2022) on daily ASA, s/p PPM, DM2, CKD stage 3 (baseline Cr 1.2-1.3 as per family), PVD, CVA x3 (without residual deficits except for mild left facial numbness), and 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 Hospitalization with CTA abdomen demonstrated mesenteric fat stranding associated with ascending/transverse colon and suggestive of acute cholecystitis (12/24/23). Patient went to the OR and is S/p SMA angiography with stent placement with diagnostic laparoscopy 12/24, small bowel and visible colon viable, some inflammation of omentum in RUQ. Patient went to SICU post op for monitoring, patient was treated for covid with remdesivir. 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 He was transferred to the floor on 1/5. During admission patient seen by medicine who recommended b/l LE duplex, and cardiology for chest pain on admission. Patients EKG non ischemic , trop -sera  - echo with normal left ventricle and moderate AS -EF 62%. Pt was treated for aspiration pneumonia during admission as well.  LUE hematoma noted 1/10 on venous Duplex, w/ visualization of complex, avascular fluid collection (5.3 x 2.7 x 3.7 cm), possibly hematoma. AMS noted on 1/16, w/ CT head finding suggestive of partial opacification of the L middle ear and mastoid air cells. CT chest on 1/16 revealed new small b/l pleural effusions a/w RML and RLL collapse. Patchy b/l GGO, c/f pulm edema. S/p BAL 1/19, revealing NRF w/ Staph aureus. Restarted on Zosyn 1/20. Repeat BAL 1/22 w/ no growth. Perc cholecystectomy on 1/26.    He was taken off brilinta several days ago for a planned pleural tap of lung effusions/GI had plans for PEG tube placement (not confirmed yet). Pt was intubated 3 days ago and since extubation yesterday it has been noted that pt has been completely "aphasic"- nonverbal and not following commands for which neurology has been consulted. LKW 7:30 PM 1/30/24. Also, it has been noted that there has been less withdrawl in the RLE compared to the LLE and that he has been looking more towards the right side than his left side and has been having "myoclonic jerks" in his shoulders with some "posturing in his arms". Prior to extubation, he was following commands. At baseline, prior to hospital admission pt was using a cane but independent of ADLs.     NIH  preMRS 1    Review of Systems:   CONSTITUTIONAL: No fevers or chills  NEUROLOGICAL: +As stated in HPI above  SKIN: No itching, burning, rashes, or lesions   All other review of systems is negative unless indicated above.    Allergies:  fluoroquinolone antibiotics (Other)  Cipro (Unknown)  Tegretol (Unknown)  carbamazepine (Other)      PMHx/PSHx/Family Hx: As above, otherwise see below   Hyperlipemia    Hypertension    Coronary Artery Disease    Diabetes Mellitus Type II    Stented Coronary Artery    Neuropathy    Myocardial infarction    Stroke    Myoclonic jerking    Stage 3 chronic kidney disease        Social Hx:  No current use of tobacco, alcohol, or illicit drugs    Medications:  MEDICATIONS  (STANDING):  albuterol/ipratropium for Nebulization 3 milliLiter(s) Nebulizer every 6 hours  artificial  tears Solution 1 Drop(s) Left EYE four times a day  chlorhexidine 2% Cloths 1 Application(s) Topical daily  dexMEDEtomidine Infusion 0.2 MICROgram(s)/kG/Hr (3.97 mL/Hr) IV Continuous <Continuous>  dextrose 50% Injectable 25 Gram(s) IV Push once  dextrose 50% Injectable 25 Gram(s) IV Push once  dextrose 50% Injectable 12.5 Gram(s) IV Push once  dextrose Oral Gel 15 Gram(s) Oral once  escitalopram 10 milliGRAM(s) Oral daily  glucagon  Injectable 1 milliGRAM(s) IntraMuscular once  insulin lispro (ADMELOG) corrective regimen sliding scale   SubCutaneous every 6 hours  insulin NPH human recombinant 6 Unit(s) SubCutaneous every 6 hours  levETIRAcetam  IVPB 250 milliGRAM(s) IV Intermittent every 12 hours  lidocaine   4% Patch 1 Patch Transdermal every 24 hours  meropenem  IVPB 1000 milliGRAM(s) IV Intermittent every 12 hours  pantoprazole  Injectable 40 milliGRAM(s) IV Push every 12 hours  petrolatum Ophthalmic Ointment 1 Application(s) Left EYE at bedtime  sodium chloride 3%  Inhalation 4 milliLiter(s) Inhalation every 6 hours  sucralfate suspension 1 Gram(s) Enteral Tube two times a day    MEDICATIONS  (PRN):      Vitals:  T(C): 37.4 (01-31-24 @ 12:00), Max: 37.4 (01-31-24 @ 12:00)  HR: 105 (01-31-24 @ 18:00) (105 - 134)  BP: 141/84 (01-31-24 @ 18:00) (92/71 - 165/76)  RR: 16 (01-31-24 @ 18:00) (15 - 22)  SpO2: 95% (01-31-24 @ 18:00) (91% - 100%)    Physical Examination: INCOMPLETE  General - NAD  Cardiovascular - Peripheral pulses palpable, no edema  Eyes - Fundoscopy with flat, sharp optic discs and no hemorrhage or exudates; Fundoscopy not well visualized; Fundoscopy not performed due to safety precautions in the setting of the COVID-19 pandemic    Neurologic Exam:  Mental status - Awake, Alert, Oriented to person, place, and time. Speech fluent, repetition and naming intact. Follows simple and complex commands. Attention/concentration, recent and remote memory (including registration and recall), and fund of knowledge intact    Cranial nerves - PERRLA, VFF, EOMI, face sensation (V1-V3) intact b/l, facial strength intact without asymmetry b/l, hearing intact b/l, palate with symmetric elevation, trapezius OR sternocleidomastiod 5/5 strength b/l, tongue midline on protrusion with full lateral movement    Motor - Normal bulk and tone throughout. No pronator drift.  Strength testing            Deltoid      Biceps      Triceps     Wrist Extension    Wrist Flexion     Interossei         R            5                 5               5                     5                              5                        5                 5  L             5                 5               5                     5                              5                        5                 5              Hip Flexion    Hip Extension    Knee Flexion    Knee Extension    Dorsiflexion    Plantar Flexion  R              5                           5                       5                           5                            5                          5  L              5                           5                        5                           5                            5                          5    Sensation - Light touch/temperature OR pain/vibration intact throughout    DTR's -             Biceps      Triceps     Brachioradialis      Patellar    Ankle    Toes/plantar response  R             2+             2+                  2+                       2+            2+                 Down  L              2+             2+                 2+                        2+           2+                 Down    Coordination - Finger to Nose intact b/l. No tremors appreciated    Gait and station - Normal casual gait. Romberg (-)    Labs:                        7.9    15.56 )-----------( 332      ( 31 Jan 2024 08:13 )             26.4     01-31    147<H>  |  108<H>  |  38<H>  ----------------------------<  211<H>  4.6   |  25  |  1.64<H>    Ca    8.1<L>      31 Jan 2024 08:13  Phos  5.0     01-31  Mg     2.40     01-31    TPro  6.3  /  Alb  2.3<L>  /  TBili  0.3  /  DBili  x   /  AST  29  /  ALT  20  /  AlkPhos  68  01-31    CAPILLARY BLOOD GLUCOSE      POCT Blood Glucose.: 236 mg/dL (31 Jan 2024 18:20)    LIVER FUNCTIONS - ( 31 Jan 2024 08:13 )  Alb: 2.3 g/dL / Pro: 6.3 g/dL / ALK PHOS: 68 U/L / ALT: 20 U/L / AST: 29 U/L / GGT: x             PT/INR - ( 31 Jan 2024 00:50 )   PT: 12.5 sec;   INR: 1.12 ratio         PTT - ( 31 Jan 2024 00:50 )  PTT:31.2 sec  CSF:                  Radiology:  CT Head No Cont:  (25 Jan 2024 20:56)     Admission Date  23-Dec-2023 18:19  Reason for Admission  COVID19, Chest pain    A 89 year old with PMH of HTN, HLD, MI, CAD s/p CABG s/p stents (last stent May 2022) on daily ASA, s/p PPM, DM2, CKD stage 3 (baseline Cr 1.2-1.3 as per family), PVD, CVA x3 (without residual deficits except for mild left facial numbness), and 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 Hospitalization with CTA abdomen demonstrated mesenteric fat stranding associated with ascending/transverse colon and suggestive of acute cholecystitis (12/24/23). Patient went to the OR and is S/p SMA angiography with stent placement with diagnostic laparoscopy 12/24, small bowel and visible colon viable, some inflammation of omentum in RUQ. Patient went to SICU post op for monitoring, patient was treated for covid with remdesivir. 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 He was transferred to the floor on 1/5. During admission patient seen by medicine who recommended b/l LE duplex, and cardiology for chest pain on admission. Patients EKG non ischemic , trop -sera  - echo with normal left ventricle and moderate AS -EF 62%. Pt was treated for aspiration pneumonia during admission as well.  LUE hematoma noted 1/10 on venous Duplex, w/ visualization of complex, avascular fluid collection (5.3 x 2.7 x 3.7 cm), possibly hematoma. AMS noted on 1/16, w/ CT head finding suggestive of partial opacification of the L middle ear and mastoid air cells. CT chest on 1/16 revealed new small b/l pleural effusions a/w RML and RLL collapse. Patchy b/l GGO, c/f pulm edema. S/p BAL 1/19, revealing NRF w/ Staph aureus. Restarted on Zosyn 1/20. Repeat BAL 1/22 w/ no growth. Perc cholecystectomy on 1/26.    He was taken off brilinta several days ago for a planned pleural tap of lung effusions/GI had plans for PEG tube placement (not confirmed yet). Pt was intubated 3 days ago and since extubation yesterday it has been noted that pt has been completely "aphasic"- nonverbal and not following commands for which neurology has been consulted. LKW 7:30 PM 1/30/24. Also, it has been noted that there is a R facial droop and pt has been having less movements on the LUE/LLE and pt has been looking towards the right side more than his left side, and has been having "myoclonic jerks" in his shoulders with some extended and inward "posturing in his arms". Prior to extubation, he was following commands. At baseline, prior to hospital admission pt was using a cane but independent of ADLs. Sedation stopped at 7:30 PM today.       NIH 25  preMRS 1    Review of Systems:   CONSTITUTIONAL: No fevers or chills  NEUROLOGICAL: +As stated in HPI above  SKIN: No itching, burning, rashes, or lesions   All other review of systems is negative unless indicated above.    Allergies:  fluoroquinolone antibiotics (Other)  Cipro (Unknown)  Tegretol (Unknown)  carbamazepine (Other)      PMHx/PSHx/Family Hx: As above, otherwise see below   Hyperlipemia    Hypertension    Coronary Artery Disease    Diabetes Mellitus Type II    Stented Coronary Artery    Neuropathy    Myocardial infarction    Stroke    Myoclonic jerking    Stage 3 chronic kidney disease        Social Hx:  No current use of tobacco, alcohol, or illicit drugs    Medications:  MEDICATIONS  (STANDING):  albuterol/ipratropium for Nebulization 3 milliLiter(s) Nebulizer every 6 hours  artificial  tears Solution 1 Drop(s) Left EYE four times a day  chlorhexidine 2% Cloths 1 Application(s) Topical daily  dexMEDEtomidine Infusion 0.2 MICROgram(s)/kG/Hr (3.97 mL/Hr) IV Continuous <Continuous>  dextrose 50% Injectable 25 Gram(s) IV Push once  dextrose 50% Injectable 25 Gram(s) IV Push once  dextrose 50% Injectable 12.5 Gram(s) IV Push once  dextrose Oral Gel 15 Gram(s) Oral once  escitalopram 10 milliGRAM(s) Oral daily  glucagon  Injectable 1 milliGRAM(s) IntraMuscular once  insulin lispro (ADMELOG) corrective regimen sliding scale   SubCutaneous every 6 hours  insulin NPH human recombinant 6 Unit(s) SubCutaneous every 6 hours  levETIRAcetam  IVPB 250 milliGRAM(s) IV Intermittent every 12 hours  lidocaine   4% Patch 1 Patch Transdermal every 24 hours  meropenem  IVPB 1000 milliGRAM(s) IV Intermittent every 12 hours  pantoprazole  Injectable 40 milliGRAM(s) IV Push every 12 hours  petrolatum Ophthalmic Ointment 1 Application(s) Left EYE at bedtime  sodium chloride 3%  Inhalation 4 milliLiter(s) Inhalation every 6 hours  sucralfate suspension 1 Gram(s) Enteral Tube two times a day    MEDICATIONS  (PRN):      Vitals:  T(C): 37.4 (01-31-24 @ 12:00), Max: 37.4 (01-31-24 @ 12:00)  HR: 105 (01-31-24 @ 18:00) (105 - 134)  BP: 141/84 (01-31-24 @ 18:00) (92/71 - 165/76)  RR: 16 (01-31-24 @ 18:00) (15 - 22)  SpO2: 95% (01-31-24 @ 18:00) (91% - 100%)    Physical Examination:   General - NAD, extubated  Cardiovascular - Peripheral pulses palpable, no edema  Eyes - Fundoscopy not performed due to safety precautions in the setting of the COVID-19 pandemic    Neurologic Exam:  Mental status - Eyes closed, does not open eyes by himself. Nonverbal, does not follow commands.     Cranial nerves - 1 mm pupils minimally reactive, decreased blink to threat bilaterally, eyes either to the right or midlline, minimal cross over to the left- oculocephalic intact, unable to test facial sensation, mild R lower facial droop, tongue midline     Gag, corneal and oculocephalic reflexes intact.     Motor - Normal bulk and tone throughout. No pronator drift.  Strength/sensation testing  Minimal movements of extremities at rest.   Minimall withdraws extremities to noxious stimuli.   It was reported that pt was having a drift in upper extremities during extubation and pt has some effort against gravity in lower extremities.     DTR's -             Biceps      Triceps     Brachioradialis      Patellar    Ankle    Toes/plantar response  R             2+             2+                  2+                       2+            2+           Mute  L              2+             2+                 2+                        2+           2+            Mute    Coordination - Unable to test given mental status    Gait and station - Unable to test given mental status    Labs:                        7.9    15.56 )-----------( 332      ( 31 Jan 2024 08:13 )             26.4     01-31    147<H>  |  108<H>  |  38<H>  ----------------------------<  211<H>  4.6   |  25  |  1.64<H>    Ca    8.1<L>      31 Jan 2024 08:13  Phos  5.0     01-31  Mg     2.40     01-31    TPro  6.3  /  Alb  2.3<L>  /  TBili  0.3  /  DBili  x   /  AST  29  /  ALT  20  /  AlkPhos  68  01-31    CAPILLARY BLOOD GLUCOSE      POCT Blood Glucose.: 236 mg/dL (31 Jan 2024 18:20)    LIVER FUNCTIONS - ( 31 Jan 2024 08:13 )  Alb: 2.3 g/dL / Pro: 6.3 g/dL / ALK PHOS: 68 U/L / ALT: 20 U/L / AST: 29 U/L / GGT: x             PT/INR - ( 31 Jan 2024 00:50 )   PT: 12.5 sec;   INR: 1.12 ratio         PTT - ( 31 Jan 2024 00:50 )  PTT:31.2 sec  CSF:                  Radiology:  CT Head No Cont:  (25 Jan 2024 20:56)     Admission Date  23-Dec-2023 18:19  Reason for Admission  COVID19, Chest pain    A 89 year old with PMH of HTN, HLD, MI, CAD s/p CABG s/p stents (last stent May 2022) on daily ASA, s/p PPM, DM2, CKD stage 3 (baseline Cr 1.2-1.3 as per family), PVD, CVA x3 (without residual deficits except for mild left facial numbness), and 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 Hospitalization with CTA abdomen demonstrated mesenteric fat stranding associated with ascending/transverse colon and suggestive of acute cholecystitis (12/24/23). Patient went to the OR and is S/p SMA angiography with stent placement with diagnostic laparoscopy 12/24, small bowel and visible colon viable, some inflammation of omentum in RUQ. Patient went to SICU post op for monitoring, patient was treated for covid with remdesivir. 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 He was transferred to the floor on 1/5. During admission patient seen by medicine who recommended b/l LE duplex, and cardiology for chest pain on admission. Patients EKG non ischemic , trop -sera  - echo with normal left ventricle and moderate AS -EF 62%. Pt was treated for aspiration pneumonia during admission as well.  LUE hematoma noted 1/10 on venous Duplex, w/ visualization of complex, avascular fluid collection (5.3 x 2.7 x 3.7 cm), possibly hematoma. AMS noted on 1/16, w/ CT head finding suggestive of partial opacification of the L middle ear and mastoid air cells. CT chest on 1/16 revealed new small b/l pleural effusions a/w RML and RLL collapse. Patchy b/l GGO, c/f pulm edema. S/p BAL 1/19, revealing NRF w/ Staph aureus. Restarted on Zosyn 1/20. Repeat BAL 1/22 w/ no growth. Perc cholecystectomy on 1/26.    He was taken off brilinta several days ago for a planned pleural tap of lung effusions/GI had plans for PEG tube placement (not confirmed yet). Pt was intubated 3 days ago and since extubation yesterday it has been noted that pt has been completely "aphasic"- nonverbal and not following commands for which neurology has been consulted. LKW 7:30 PM 1/30/24. Also, it has been noted that there is a R facial droop and pt has been having less movements on the LUE/LLE and pt has been looking towards the right side more than his left side, and has been having "myoclonic jerks" in his shoulders with some extended and inward "posturing in his arms". Prior to extubation, he was following commands. At baseline, prior to hospital admission pt was using a cane but independent of ADLs. Sedation stopped at 7:30 PM today.       NIH 25  preMRS 1    Review of Systems:   CONSTITUTIONAL: No fevers or chills  NEUROLOGICAL: +As stated in HPI above  SKIN: No itching, burning, rashes, or lesions   All other review of systems is negative unless indicated above.    Allergies:  fluoroquinolone antibiotics (Other)  Cipro (Unknown)  Tegretol (Unknown)  carbamazepine (Other)      PMHx/PSHx/Family Hx: As above, otherwise see below   Hyperlipemia    Hypertension    Coronary Artery Disease    Diabetes Mellitus Type II    Stented Coronary Artery    Neuropathy    Myocardial infarction    Stroke    Myoclonic jerking    Stage 3 chronic kidney disease        Social Hx:  No current use of tobacco, alcohol, or illicit drugs    Medications:  MEDICATIONS  (STANDING):  albuterol/ipratropium for Nebulization 3 milliLiter(s) Nebulizer every 6 hours  artificial  tears Solution 1 Drop(s) Left EYE four times a day  chlorhexidine 2% Cloths 1 Application(s) Topical daily  dexMEDEtomidine Infusion 0.2 MICROgram(s)/kG/Hr (3.97 mL/Hr) IV Continuous <Continuous>  dextrose 50% Injectable 25 Gram(s) IV Push once  dextrose 50% Injectable 25 Gram(s) IV Push once  dextrose 50% Injectable 12.5 Gram(s) IV Push once  dextrose Oral Gel 15 Gram(s) Oral once  escitalopram 10 milliGRAM(s) Oral daily  glucagon  Injectable 1 milliGRAM(s) IntraMuscular once  insulin lispro (ADMELOG) corrective regimen sliding scale   SubCutaneous every 6 hours  insulin NPH human recombinant 6 Unit(s) SubCutaneous every 6 hours  levETIRAcetam  IVPB 250 milliGRAM(s) IV Intermittent every 12 hours  lidocaine   4% Patch 1 Patch Transdermal every 24 hours  meropenem  IVPB 1000 milliGRAM(s) IV Intermittent every 12 hours  pantoprazole  Injectable 40 milliGRAM(s) IV Push every 12 hours  petrolatum Ophthalmic Ointment 1 Application(s) Left EYE at bedtime  sodium chloride 3%  Inhalation 4 milliLiter(s) Inhalation every 6 hours  sucralfate suspension 1 Gram(s) Enteral Tube two times a day    MEDICATIONS  (PRN):      Vitals:  T(C): 37.4 (01-31-24 @ 12:00), Max: 37.4 (01-31-24 @ 12:00)  HR: 105 (01-31-24 @ 18:00) (105 - 134)  BP: 141/84 (01-31-24 @ 18:00) (92/71 - 165/76)  RR: 16 (01-31-24 @ 18:00) (15 - 22)  SpO2: 95% (01-31-24 @ 18:00) (91% - 100%)    Physical Examination:   General - NAD, extubated  Cardiovascular - Peripheral pulses palpable, no edema  Eyes - Fundoscopy not performed due to safety precautions in the setting of the COVID-19 pandemic    Neurologic Exam:  Mental status - Eyes closed, does not open eyes by himself. Nonverbal, does not follow commands.     Cranial nerves - 1 mm pupils minimally reactive, decreased blink to threat bilaterally, eyes either to the right or midlline, minimal cross over to the left- oculocephalic intact, unable to test facial sensation, mild R lower facial droop, tongue midline     Gag, corneal and oculocephalic reflexes intact.     Motor - Normal bulk and tone throughout. No pronator drift.  Strength/sensation testing  Minimal movements of extremities at rest.   Minimall withdraws extremities to noxious stimuli.   It was reported that pt was having a drift in upper extremities during extubation and pt has some effort against gravity in lower extremities.     DTR's -             Biceps      Triceps     Brachioradialis      Patellar    Ankle    Toes/plantar response  R             2+             2+                  2+                       2+            2+           Mute  L              2+             2+                 2+                        2+           2+            Mute    Coordination - Unable to test given mental status    Gait and station - Unable to test given mental status    Labs:                        7.9    15.56 )-----------( 332      ( 31 Jan 2024 08:13 )             26.4     01-31    147<H>  |  108<H>  |  38<H>  ----------------------------<  211<H>  4.6   |  25  |  1.64<H>    Ca    8.1<L>      31 Jan 2024 08:13  Phos  5.0     01-31  Mg     2.40     01-31    TPro  6.3  /  Alb  2.3<L>  /  TBili  0.3  /  DBili  x   /  AST  29  /  ALT  20  /  AlkPhos  68  01-31    CAPILLARY BLOOD GLUCOSE      POCT Blood Glucose.: 236 mg/dL (31 Jan 2024 18:20)    LIVER FUNCTIONS - ( 31 Jan 2024 08:13 )  Alb: 2.3 g/dL / Pro: 6.3 g/dL / ALK PHOS: 68 U/L / ALT: 20 U/L / AST: 29 U/L / GGT: x             PT/INR - ( 31 Jan 2024 00:50 )   PT: 12.5 sec;   INR: 1.12 ratio         PTT - ( 31 Jan 2024 00:50 )  PTT:31.2 sec  CSF:                  Radiology:  CT Head No Cont:  (25 Jan 2024 20:56)    CTA/CTP:   Motion degraded study without large vessel occlusion or new gray-white matter differentiation loss to suggest acute stroke.    No hydrocephalus. Age commiserate volume loss with proportional ventricular prominence.    Incidental findings in the upper chest including at least moderate bilateral effusions with atelectasis, tracheobronchial secretions with dense opacification of the proximal right mainstem bronchus at the fox. Multifocal opacities. Constellation suggests multifocal pneumonia with or without component of volume overload. Cardiomegaly. Correlate with proBNP and white count.    Follow-up final report. 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 Hospitalization with CTA abdomen demonstrated mesenteric fat stranding associated with ascending/transverse colon and suggestive of acute cholecystitis (12/24/23). Patient went to the OR and is S/p SMA angiography with stent placement with diagnostic laparoscopy 12/24, small bowel and visible colon viable, some inflammation of omentum in RUQ. Patient went to SICU post op for monitoring, patient was treated for covid with remdesivir. 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 He was transferred to the floor on 1/5. During admission patient seen by medicine who recommended b/l LE duplex, and cardiology for chest pain on admission. Patients EKG non ischemic , trop -sera  - echo with normal left ventricle and moderate AS -EF 62%. Pt was treated for aspiration pneumonia during admission as well.  LUE hematoma noted 1/10 on venous Duplex, w/ visualization of complex, avascular fluid collection (5.3 x 2.7 x 3.7 cm), possibly hematoma. AMS noted on 1/16, w/ CT head finding suggestive of partial opacification of the L middle ear and mastoid air cells. CT chest on 1/16 revealed new small b/l pleural effusions a/w RML and RLL collapse. Patchy b/l GGO, c/f pulm edema. S/p BAL 1/19, revealing NRF w/ Staph aureus. Restarted on Zosyn 1/20. Repeat BAL 1/22 w/ no growth. Perc cholecystectomy on 1/26.    He was taken off brilinta several days ago for a planned pleural tap of lung effusions/GI had plans for PEG tube placement (not confirmed yet). Pt was intubated 3 days ago and since extubation yesterday it has been noted that pt has been completely "aphasic"- nonverbal and not following commands for which neurology has been consulted. LKW 7:30 PM 1/30/24. Also, it has been noted that there is a R facial droop and pt has been having less movements on the LUE/LLE and pt has been looking towards the right side more than his left side, and has been having "myoclonic jerks" in his shoulders with some extended and inward "posturing in his arms". Prior to extubation, he was following commands. At baseline, prior to hospital admission pt was using a cane but independent of ADLs. Sedation stopped at 7:30 PM today.       NIH 25  preMRS 1    Review of Systems:   CONSTITUTIONAL: No fevers or chills  NEUROLOGICAL: +As stated in HPI above  SKIN: No itching, burning, rashes, or lesions   All other review of systems is negative unless indicated above.    Allergies:  fluoroquinolone antibiotics (Other)  Cipro (Unknown)  Tegretol (Unknown)  carbamazepine (Other)      PMHx/PSHx/Family Hx: As above, otherwise see below   Hyperlipemia    Hypertension    Coronary Artery Disease    Diabetes Mellitus Type II    Stented Coronary Artery    Neuropathy    Myocardial infarction    Stroke    Myoclonic jerking    Stage 3 chronic kidney disease        Social Hx:  No current use of tobacco, alcohol, or illicit drugs    Medications:  MEDICATIONS  (STANDING):  albuterol/ipratropium for Nebulization 3 milliLiter(s) Nebulizer every 6 hours  artificial  tears Solution 1 Drop(s) Left EYE four times a day  chlorhexidine 2% Cloths 1 Application(s) Topical daily  dexMEDEtomidine Infusion 0.2 MICROgram(s)/kG/Hr (3.97 mL/Hr) IV Continuous <Continuous>  dextrose 50% Injectable 25 Gram(s) IV Push once  dextrose 50% Injectable 25 Gram(s) IV Push once  dextrose 50% Injectable 12.5 Gram(s) IV Push once  dextrose Oral Gel 15 Gram(s) Oral once  escitalopram 10 milliGRAM(s) Oral daily  glucagon  Injectable 1 milliGRAM(s) IntraMuscular once  insulin lispro (ADMELOG) corrective regimen sliding scale   SubCutaneous every 6 hours  insulin NPH human recombinant 6 Unit(s) SubCutaneous every 6 hours  levETIRAcetam  IVPB 250 milliGRAM(s) IV Intermittent every 12 hours  lidocaine   4% Patch 1 Patch Transdermal every 24 hours  meropenem  IVPB 1000 milliGRAM(s) IV Intermittent every 12 hours  pantoprazole  Injectable 40 milliGRAM(s) IV Push every 12 hours  petrolatum Ophthalmic Ointment 1 Application(s) Left EYE at bedtime  sodium chloride 3%  Inhalation 4 milliLiter(s) Inhalation every 6 hours  sucralfate suspension 1 Gram(s) Enteral Tube two times a day    MEDICATIONS  (PRN):      Vitals:  T(C): 37.4 (01-31-24 @ 12:00), Max: 37.4 (01-31-24 @ 12:00)  HR: 105 (01-31-24 @ 18:00) (105 - 134)  BP: 141/84 (01-31-24 @ 18:00) (92/71 - 165/76)  RR: 16 (01-31-24 @ 18:00) (15 - 22)  SpO2: 95% (01-31-24 @ 18:00) (91% - 100%)    Physical Examination:   General - NAD, extubated  Cardiovascular - Peripheral pulses palpable, no edema  Eyes - Fundoscopy not performed due to safety precautions in the setting of the COVID-19 pandemic    Neurologic Exam:  Mental status - Eyes closed, does not open eyes by himself. Nonverbal, does not follow commands.     Cranial nerves - 1 mm pupils minimally reactive, decreased blink to threat bilaterally, eyes either to the right or midlline, minimal cross over to the left- oculocephalic intact, unable to test facial sensation, mild R lower facial droop, tongue midline     Gag, corneal and oculocephalic reflexes intact.     Motor - Normal bulk and tone throughout. No pronator drift.  Strength/sensation testing  Minimal movements of extremities at rest.   Minimall withdraws extremities to noxious stimuli.   It was reported that pt was having a drift in upper extremities during extubation and pt has some effort against gravity in lower extremities.     DTR's -             Biceps      Triceps     Brachioradialis      Patellar    Ankle    Toes/plantar response  R             2+             2+                  2+                       2+            2+           Mute  L              2+             2+                 2+                        2+           2+            Mute    Coordination - Unable to test given mental status    Gait and station - Unable to test given mental status    Labs:                        7.9    15.56 )-----------( 332      ( 31 Jan 2024 08:13 )             26.4     01-31    147<H>  |  108<H>  |  38<H>  ----------------------------<  211<H>  4.6   |  25  |  1.64<H>    Ca    8.1<L>      31 Jan 2024 08:13  Phos  5.0     01-31  Mg     2.40     01-31    TPro  6.3  /  Alb  2.3<L>  /  TBili  0.3  /  DBili  x   /  AST  29  /  ALT  20  /  AlkPhos  68  01-31    CAPILLARY BLOOD GLUCOSE      POCT Blood Glucose.: 236 mg/dL (31 Jan 2024 18:20)    LIVER FUNCTIONS - ( 31 Jan 2024 08:13 )  Alb: 2.3 g/dL / Pro: 6.3 g/dL / ALK PHOS: 68 U/L / ALT: 20 U/L / AST: 29 U/L / GGT: x             PT/INR - ( 31 Jan 2024 00:50 )   PT: 12.5 sec;   INR: 1.12 ratio         PTT - ( 31 Jan 2024 00:50 )  PTT:31.2 sec  CSF:                  Radiology:  CT Head No Cont:  (25 Jan 2024 20:56)    CTA/CTP:   Motion degraded study without large vessel occlusion or new gray-white matter differentiation loss to suggest acute stroke.    No hydrocephalus. Age commiserate volume loss with proportional ventricular prominence.    Incidental findings in the upper chest including at least moderate bilateral effusions with atelectasis, tracheobronchial secretions with dense opacification of the proximal right mainstem bronchus at the fox. Multifocal opacities. Constellation suggests multifocal pneumonia with or without component of volume overload. Cardiomegaly. Correlate with proBNP and white count.    Follow-up final report.

## 2024-01-31 NOTE — PROGRESS NOTE ADULT - ASSESSMENT
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     PLAN  =====Neurologic=====  #CVA  - prior CVA x3 with no residual deficits  #myoclonic jerks  - on Keppra will continue  - holding home  gabapentin and memantine in setting of somnolence  - continue lexapro  - wean precedex as tolerated       =====Pulmonary=====  #COVID  - s/p paxlovid and 1 dose remdesivir while inpatient  #Pleural effusions b/l  - CT chest from 1/16 showing new small bilateral pleural effusions/ atelectasis/ patchy bilateral ground-glass opacities are consistent with pulmonary edema.  - currently on zosyn for aspiration PNA from 1/20- 1/28   - s/p bronch for increasing secretions- on duonebs and HTS currently, intermittent IPV BID   - bronchial cultures with staph aureus, continue zosyn   - intubated on 1/22 for airway support, currently off sedatives attempt to extubate   - improvement of effusions --> unlikely to need thora, will give lasix prior to extubation  - POCUS with resolution of b/l pleural effusions, still holding brilinta until next week possible reaccumulation requiring pleural tap     =====Cardiovascular=====  Patient does not currently require vasopressors and is normotensive  #HTN  - on home amlodipine and metoprolol currently holding     #CAD  - on Brilinta (holding) aspirin and ranolazine continue   - as per vascular okay to hold Brilinta for possible pleural effusion thoracentesis  -have not heard back from cardiology regarding Brilinta / last stent 2022 , will hold     =====GI=====  #upper GI bleed  - s/p EGD showing dieulafoy lesion and esophagitis likely 2/2 NGT irritation s/p 2 clips  - on protonix IV BID continue   - CT on 1/25 with cholelithiasis and sludge HIDA on 1/26 confirming likely acute choley, s/p IR perc cholecystostomy 1/26   - plan for PEG tube per familys GO    #Diet  - tube feeds NGT     =====Renal/=====  #Electrolytes  - Maintain K>4, Phos>3, Mag>2, iCal>1  - baseline cr 1.5, at baseline      =====Endocrine=====  #DM2  - on NPH 16 units q6 and SSI given solumedrol   - a1c 9.3, glucose wnl inpatient   - hypoglycemic overnight 1/26  started on d10 drips and insulin DC'd  - CTM w/ FSGs, will consider restarting insulin at lower dose  -NPH increasaed to 6u 1/28 due to hyperglycemia      =====Infectious Disease=====  #COVID   - s/p paxlovid and 1 dose remdesivir  # PNA  - CT chest showing ground glass opacities  - continue zosyn  - intermittent episodes of fevers, likely source GI given choleycystitis? culture NGTD  - ID consult 1/24-  CT maxillofacial CT head wnl  - CT chest with evolving GGO since 1/16    =====Heme/Onc=====  #DVT Ppx  - heparin sub-q    =====Ethics=====  FULL CODE.

## 2024-02-01 LAB
ALBUMIN SERPL ELPH-MCNC: 2.6 G/DL — LOW (ref 3.3–5)
ALP SERPL-CCNC: 68 U/L — SIGNIFICANT CHANGE UP (ref 40–120)
ALT FLD-CCNC: 42 U/L — HIGH (ref 4–41)
ANION GAP SERPL CALC-SCNC: 11 MMOL/L — SIGNIFICANT CHANGE UP (ref 7–14)
ANION GAP SERPL CALC-SCNC: 13 MMOL/L — SIGNIFICANT CHANGE UP (ref 7–14)
ANION GAP SERPL CALC-SCNC: 14 MMOL/L — SIGNIFICANT CHANGE UP (ref 7–14)
ANION GAP SERPL CALC-SCNC: 15 MMOL/L — HIGH (ref 7–14)
APTT BLD: 34.2 SEC — SIGNIFICANT CHANGE UP (ref 24.5–35.6)
AST SERPL-CCNC: 64 U/L — HIGH (ref 4–40)
BILIRUB SERPL-MCNC: 0.3 MG/DL — SIGNIFICANT CHANGE UP (ref 0.2–1.2)
BLOOD GAS ARTERIAL - LYTES,HGB,ICA,LACT RESULT: SIGNIFICANT CHANGE UP
BLOOD GAS ARTERIAL COMPREHENSIVE RESULT: SIGNIFICANT CHANGE UP
BUN SERPL-MCNC: 39 MG/DL — HIGH (ref 7–23)
BUN SERPL-MCNC: 40 MG/DL — HIGH (ref 7–23)
BUN SERPL-MCNC: 40 MG/DL — HIGH (ref 7–23)
BUN SERPL-MCNC: 42 MG/DL — HIGH (ref 7–23)
CALCIUM SERPL-MCNC: 8.3 MG/DL — LOW (ref 8.4–10.5)
CALCIUM SERPL-MCNC: 8.4 MG/DL — SIGNIFICANT CHANGE UP (ref 8.4–10.5)
CALCIUM SERPL-MCNC: 8.4 MG/DL — SIGNIFICANT CHANGE UP (ref 8.4–10.5)
CALCIUM SERPL-MCNC: 8.5 MG/DL — SIGNIFICANT CHANGE UP (ref 8.4–10.5)
CHLORIDE SERPL-SCNC: 110 MMOL/L — HIGH (ref 98–107)
CHLORIDE SERPL-SCNC: 111 MMOL/L — HIGH (ref 98–107)
CO2 SERPL-SCNC: 27 MMOL/L — SIGNIFICANT CHANGE UP (ref 22–31)
CO2 SERPL-SCNC: 27 MMOL/L — SIGNIFICANT CHANGE UP (ref 22–31)
CO2 SERPL-SCNC: 28 MMOL/L — SIGNIFICANT CHANGE UP (ref 22–31)
CO2 SERPL-SCNC: 29 MMOL/L — SIGNIFICANT CHANGE UP (ref 22–31)
CREAT SERPL-MCNC: 1.87 MG/DL — HIGH (ref 0.5–1.3)
CREAT SERPL-MCNC: 1.88 MG/DL — HIGH (ref 0.5–1.3)
CULTURE RESULTS: SIGNIFICANT CHANGE UP
EGFR: 34 ML/MIN/1.73M2 — LOW
GAS PNL BLDA: SIGNIFICANT CHANGE UP
GAS PNL BLDA: SIGNIFICANT CHANGE UP
GLUCOSE BLDC GLUCOMTR-MCNC: 193 MG/DL — HIGH (ref 70–99)
GLUCOSE BLDC GLUCOMTR-MCNC: 197 MG/DL — HIGH (ref 70–99)
GLUCOSE BLDC GLUCOMTR-MCNC: 216 MG/DL — HIGH (ref 70–99)
GLUCOSE BLDC GLUCOMTR-MCNC: 239 MG/DL — HIGH (ref 70–99)
GLUCOSE SERPL-MCNC: 205 MG/DL — HIGH (ref 70–99)
GLUCOSE SERPL-MCNC: 215 MG/DL — HIGH (ref 70–99)
GLUCOSE SERPL-MCNC: 216 MG/DL — HIGH (ref 70–99)
GLUCOSE SERPL-MCNC: 258 MG/DL — HIGH (ref 70–99)
HCT VFR BLD CALC: 25.4 % — LOW (ref 39–50)
HGB BLD-MCNC: 7.4 G/DL — LOW (ref 13–17)
INR BLD: 1.18 RATIO — SIGNIFICANT CHANGE UP (ref 0.85–1.18)
MAGNESIUM SERPL-MCNC: 2.5 MG/DL — SIGNIFICANT CHANGE UP (ref 1.6–2.6)
MAGNESIUM SERPL-MCNC: 2.5 MG/DL — SIGNIFICANT CHANGE UP (ref 1.6–2.6)
MAGNESIUM SERPL-MCNC: 2.6 MG/DL — SIGNIFICANT CHANGE UP (ref 1.6–2.6)
MCHC RBC-ENTMCNC: 29.1 GM/DL — LOW (ref 32–36)
MCHC RBC-ENTMCNC: 29.4 PG — SIGNIFICANT CHANGE UP (ref 27–34)
MCV RBC AUTO: 100.8 FL — HIGH (ref 80–100)
NRBC # BLD: 0 /100 WBCS — SIGNIFICANT CHANGE UP (ref 0–0)
NRBC # FLD: 0 K/UL — SIGNIFICANT CHANGE UP (ref 0–0)
PHOSPHATE SERPL-MCNC: 4.5 MG/DL — SIGNIFICANT CHANGE UP (ref 2.5–4.5)
PHOSPHATE SERPL-MCNC: 4.7 MG/DL — HIGH (ref 2.5–4.5)
PHOSPHATE SERPL-MCNC: 5.1 MG/DL — HIGH (ref 2.5–4.5)
PLATELET # BLD AUTO: 417 K/UL — HIGH (ref 150–400)
POTASSIUM SERPL-MCNC: 4.2 MMOL/L — SIGNIFICANT CHANGE UP (ref 3.5–5.3)
POTASSIUM SERPL-MCNC: 4.5 MMOL/L — SIGNIFICANT CHANGE UP (ref 3.5–5.3)
POTASSIUM SERPL-MCNC: 4.5 MMOL/L — SIGNIFICANT CHANGE UP (ref 3.5–5.3)
POTASSIUM SERPL-MCNC: 4.7 MMOL/L — SIGNIFICANT CHANGE UP (ref 3.5–5.3)
POTASSIUM SERPL-SCNC: 4.2 MMOL/L — SIGNIFICANT CHANGE UP (ref 3.5–5.3)
POTASSIUM SERPL-SCNC: 4.5 MMOL/L — SIGNIFICANT CHANGE UP (ref 3.5–5.3)
POTASSIUM SERPL-SCNC: 4.5 MMOL/L — SIGNIFICANT CHANGE UP (ref 3.5–5.3)
POTASSIUM SERPL-SCNC: 4.7 MMOL/L — SIGNIFICANT CHANGE UP (ref 3.5–5.3)
PROT SERPL-MCNC: 6.8 G/DL — SIGNIFICANT CHANGE UP (ref 6–8.3)
PROTHROM AB SERPL-ACNC: 13.1 SEC — HIGH (ref 9.5–13)
RBC # BLD: 2.52 M/UL — LOW (ref 4.2–5.8)
RBC # FLD: 17.7 % — HIGH (ref 10.3–14.5)
SODIUM SERPL-SCNC: 151 MMOL/L — HIGH (ref 135–145)
SODIUM SERPL-SCNC: 151 MMOL/L — HIGH (ref 135–145)
SODIUM SERPL-SCNC: 152 MMOL/L — HIGH (ref 135–145)
SODIUM SERPL-SCNC: 153 MMOL/L — HIGH (ref 135–145)
SPECIMEN SOURCE: SIGNIFICANT CHANGE UP
WBC # BLD: 13.91 K/UL — HIGH (ref 3.8–10.5)
WBC # FLD AUTO: 13.91 K/UL — HIGH (ref 3.8–10.5)

## 2024-02-01 PROCEDURE — 99232 SBSQ HOSP IP/OBS MODERATE 35: CPT

## 2024-02-01 PROCEDURE — 99291 CRITICAL CARE FIRST HOUR: CPT

## 2024-02-01 PROCEDURE — 95720 EEG PHY/QHP EA INCR W/VEEG: CPT

## 2024-02-01 RX ORDER — VALPROIC ACID (AS SODIUM SALT) 250 MG/5ML
1500 SOLUTION, ORAL ORAL ONCE
Refills: 0 | Status: COMPLETED | OUTPATIENT
Start: 2024-02-01 | End: 2024-02-01

## 2024-02-01 RX ORDER — PIPERACILLIN AND TAZOBACTAM 4; .5 G/20ML; G/20ML
3.38 INJECTION, POWDER, LYOPHILIZED, FOR SOLUTION INTRAVENOUS EVERY 8 HOURS
Refills: 0 | Status: COMPLETED | OUTPATIENT
Start: 2024-02-01 | End: 2024-02-01

## 2024-02-01 RX ORDER — VALPROIC ACID (AS SODIUM SALT) 250 MG/5ML
500 SOLUTION, ORAL ORAL EVERY 8 HOURS
Refills: 0 | Status: DISCONTINUED | OUTPATIENT
Start: 2024-02-01 | End: 2024-02-02

## 2024-02-01 RX ORDER — ASPIRIN/CALCIUM CARB/MAGNESIUM 324 MG
300 TABLET ORAL DAILY
Refills: 0 | Status: DISCONTINUED | OUTPATIENT
Start: 2024-02-01 | End: 2024-02-05

## 2024-02-01 RX ORDER — SODIUM CHLORIDE 9 MG/ML
1000 INJECTION, SOLUTION INTRAVENOUS
Refills: 0 | Status: DISCONTINUED | OUTPATIENT
Start: 2024-02-01 | End: 2024-02-02

## 2024-02-01 RX ADMIN — SODIUM CHLORIDE 4 MILLILITER(S): 9 INJECTION INTRAMUSCULAR; INTRAVENOUS; SUBCUTANEOUS at 15:41

## 2024-02-01 RX ADMIN — LEVETIRACETAM 400 MILLIGRAM(S): 250 TABLET, FILM COATED ORAL at 18:11

## 2024-02-01 RX ADMIN — PANTOPRAZOLE SODIUM 40 MILLIGRAM(S): 20 TABLET, DELAYED RELEASE ORAL at 05:45

## 2024-02-01 RX ADMIN — MEROPENEM 100 MILLIGRAM(S): 1 INJECTION INTRAVENOUS at 05:43

## 2024-02-01 RX ADMIN — LIDOCAINE 1 PATCH: 4 CREAM TOPICAL at 21:18

## 2024-02-01 RX ADMIN — LIDOCAINE 1 PATCH: 4 CREAM TOPICAL at 06:42

## 2024-02-01 RX ADMIN — Medication 1 DROP(S): at 12:39

## 2024-02-01 RX ADMIN — Medication 1: at 05:43

## 2024-02-01 RX ADMIN — PANTOPRAZOLE SODIUM 40 MILLIGRAM(S): 20 TABLET, DELAYED RELEASE ORAL at 18:10

## 2024-02-01 RX ADMIN — PIPERACILLIN AND TAZOBACTAM 25 GRAM(S): 4; .5 INJECTION, POWDER, LYOPHILIZED, FOR SOLUTION INTRAVENOUS at 16:42

## 2024-02-01 RX ADMIN — CHLORHEXIDINE GLUCONATE 1 APPLICATION(S): 213 SOLUTION TOPICAL at 12:40

## 2024-02-01 RX ADMIN — Medication 55 MILLIGRAM(S): at 22:25

## 2024-02-01 RX ADMIN — Medication 300 MILLIGRAM(S): at 12:39

## 2024-02-01 RX ADMIN — Medication 3 MILLILITER(S): at 21:46

## 2024-02-01 RX ADMIN — Medication 65 MILLIGRAM(S): at 17:20

## 2024-02-01 RX ADMIN — Medication 3 MILLILITER(S): at 15:40

## 2024-02-01 RX ADMIN — LEVETIRACETAM 400 MILLIGRAM(S): 250 TABLET, FILM COATED ORAL at 05:44

## 2024-02-01 RX ADMIN — LIDOCAINE 1 PATCH: 4 CREAM TOPICAL at 10:44

## 2024-02-01 RX ADMIN — Medication 2: at 12:39

## 2024-02-01 RX ADMIN — Medication 3 MILLILITER(S): at 03:10

## 2024-02-01 RX ADMIN — SODIUM CHLORIDE 4 MILLILITER(S): 9 INJECTION INTRAMUSCULAR; INTRAVENOUS; SUBCUTANEOUS at 21:47

## 2024-02-01 RX ADMIN — Medication 1 DROP(S): at 01:02

## 2024-02-01 RX ADMIN — SODIUM CHLORIDE 4 MILLILITER(S): 9 INJECTION INTRAMUSCULAR; INTRAVENOUS; SUBCUTANEOUS at 09:16

## 2024-02-01 RX ADMIN — SODIUM CHLORIDE 75 MILLILITER(S): 9 INJECTION, SOLUTION INTRAVENOUS at 10:04

## 2024-02-01 RX ADMIN — Medication 1 DROP(S): at 18:10

## 2024-02-01 RX ADMIN — Medication 1 DROP(S): at 05:45

## 2024-02-01 RX ADMIN — SODIUM CHLORIDE 4 MILLILITER(S): 9 INJECTION INTRAMUSCULAR; INTRAVENOUS; SUBCUTANEOUS at 03:10

## 2024-02-01 RX ADMIN — Medication 1 APPLICATION(S): at 21:18

## 2024-02-01 RX ADMIN — Medication 3 MILLILITER(S): at 09:16

## 2024-02-01 RX ADMIN — Medication 2: at 18:10

## 2024-02-01 RX ADMIN — Medication 1: at 01:02

## 2024-02-01 NOTE — PROGRESS NOTE ADULT - ASSESSMENT
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     EKG SR RBBB (old per family)     1) Hypoxia   - s/p bronch   - transferred to MICU intubated now s/p extubation on bipap  - Rt pleural effusion ,  - hold lasix given Hypernatremia and worsening creat    2) CAD s/p CABG  -p/w chest pain. EKG non ischemic , trop -sera   - echo with normal lv and moderate AS   - c/w metoprolol   - chest pains  Trop mildly elevated and trending down likely sec to kidney disease,   -holding brilinta, was on it for SMA stent placed in 12/2023, held 2/2 anticipation for PEG placement, restart when no further invasive procedures planned    3) Covid +  - s/p remdesivir   - c/w supportive management   - t/t per primary team     4) Abd distension   -CTA AP obtained which showed  partially occlusive calcified and non-calcified plaque just distal to take-off of the SMA, with estimated at least 75% luminal narrowing. Limited evaluation of its distal branches. 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   -on Protonix IV q 12

## 2024-02-01 NOTE — PROGRESS NOTE ADULT - ATTENDING COMMENTS
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 Course c/b acute cholecystitis s/p perc asa 1/26 by IR    #Acute hypoxemic/hypercapnic respiratory failure  #MSSA Pneumonia  #Dysphagia  #R pleural effusion  #Acute cholecystitis  #Encephalopathy - Likely 2/2 delirium vs toxic metabolic vs seizures/CVA. CTH w/ perfusion with no evidence for acute infarct, notable for moderate to severe narrowing L internal carotid at the origin as well as severe narrowing R internal carotid no perfusion abnormality at the Tmax 6 second threshold, but moderate hypoperfusion in R MCA territory at the 4 second threshold.  - Extubated 1/30, now on BIPAP. Monitor ABGs. May require reintubation of mental status does not improve or hypercapnia worsens  - Now extubated, will evaluate need for PEG if continued concern for aspiration  - c/w empiric Meropenem for now, f/u cultures, NGTD. Plan for 5 day course  - Holding Brilinta for now for possible thora (cardiac stents >1 year ago). c/w ASA for now (changed to per rectum)  - f/u EEG, c/w Keppra (loaded and now on higher dose)  - MRI if more stable  - f/u neuro recs  - Will order D5 at 75 cc/hr, monitor BMP    Neil Goode MD  Pulmonary & Critical Care

## 2024-02-01 NOTE — PROGRESS NOTE ADULT - SUBJECTIVE AND OBJECTIVE BOX
Follow Up:    Fever    Interval History/ROS:  Afebrile ON. +vEEG for aphasia and myoclonic jerks. Patient was seen and examined at bedside. BiPAP. Does not respond to verbal stimuli. ROS unable to assess.    Allergies  fluoroquinolone antibiotics (Other)  Cipro (Unknown)  Tegretol (Unknown)  carbamazepine (Other)        ANTIMICROBIALS:  piperacillin/tazobactam IVPB.. 3.375 every 8 hours      OTHER MEDS:  MEDICATIONS  (STANDING):  albuterol/ipratropium for Nebulization 3 every 6 hours  aspirin Suppository 300 daily  dextrose 50% Injectable 25 once  dextrose 50% Injectable 25 once  dextrose 50% Injectable 12.5 once  dextrose Oral Gel 15 once  escitalopram 10 daily  glucagon  Injectable 1 once  insulin lispro (ADMELOG) corrective regimen sliding scale  every 6 hours  levETIRAcetam  IVPB 500 every 12 hours  pantoprazole  Injectable 40 every 12 hours  sodium chloride 3%  Inhalation 4 every 6 hours  sucralfate suspension 1 two times a day  valproate sodium  IVPB 1500 once  valproate sodium  IVPB 500 every 8 hours      Vital Signs Last 24 Hrs  T(C): 36.8 (01 Feb 2024 16:00), Max: 37.2 (01 Feb 2024 04:00)  T(F): 98.3 (01 Feb 2024 16:00), Max: 98.9 (01 Feb 2024 04:00)  HR: 125 (01 Feb 2024 16:00) (93 - 125)  BP: 147/79 (01 Feb 2024 16:00) (94/75 - 155/66)  BP(mean): 94 (01 Feb 2024 16:00) (67 - 103)  RR: 27 (01 Feb 2024 16:00) (16 - 27)  SpO2: 99% (01 Feb 2024 16:00) (91% - 100%)    Parameters below as of 01 Feb 2024 09:17  Patient On (Oxygen Delivery Method): BiPAP/CPAP        PHYSICAL EXAM:  Constitutional: Awake, +BiPAP  HEAD/EYES: L eye w/ subconjunctival hemorrhage; EEG leads  ENT:  +BiPAP mask  Cardiovascular:  normal S1, S2, no murmur, tachycardic  Respiratory:  coarse breath sounds  GI:  soft, nondistended  Extremity: no BLE edema                                7.4    13.91 )-----------( 417      ( 01 Feb 2024 01:31 )             25.4       02-01    152<H>  |  111<H>  |  42<H>  ----------------------------<  215<H>  4.7   |  28  |  1.87<H>    Ca    8.5      01 Feb 2024 12:05  Phos  4.7     02-01  Mg     2.60     02-01    TPro  6.8  /  Alb  2.6<L>  /  TBili  0.3  /  DBili  x   /  AST  64<H>  /  ALT  42<H>  /  AlkPhos  68  02-01      Urinalysis Basic - ( 01 Feb 2024 12:05 )    Color: x / Appearance: x / SG: x / pH: x  Gluc: 215 mg/dL / Ketone: x  / Bili: x / Urobili: x   Blood: x / Protein: x / Nitrite: x   Leuk Esterase: x / RBC: x / WBC x   Sq Epi: x / Non Sq Epi: x / Bacteria: x        MICROBIOLOGY:  v  .Body Fluid gallbladder  01-26-24   No growth at 5 days  --    No polymorphonuclear leukocytes per low power field  No organisms seen per oil power field      .Blood Blood  01-24-24   No growth at 5 days  --  --      .Bronchial Bronchial  01-22-24   Normal Respiratory Aurelia present  --    Moderate polymorphonuclear leukocytes seen per low power field  No squamous epithelial cells per low power field  No organisms seen per oil power field      .Blood Blood-Peripheral  01-20-24   No growth at 5 days  --  --      .Blood Blood-Peripheral  01-20-24   No growth at 5 days  --  --      .Bronchial R MIDDLE LOBE  01-19-24   Numerous Staphylococcus aureus  Normal Respiratory Aurelia present  --  Staphylococcus aureus                RADIOLOGY:  Imaging below independently reviewed.  < from: CT Angio Neck w/ IV Cont (01.31.24 @ 21:00) >  IMPRESSION:      1. No CT evidence ofan acute territorial infarct or hemorrhage, with   underlying volume loss. No hemorrhage or mass identified.  2. Extensive calcified plaque in the head and neck.  3. Moderate to severe narrowing left internal carotid at the origin.   Severe narrowingright internal carotid by a tandem lesion 1.5 cm above   the bifurcation with a narrowed internal carotid beyond the lesion.  4. Extensive calcified plaque both cavernous and supraclinoid carotid   arteries with narrowing but no occlusion. Mild narrowing proximal right   M1 segment. Moderate narrowing both vertebral V4 segments  5. No perfusion abnormality at the Tmax 6 second threshold, but moderate   hypoperfusion in the right MCA territory at the 4 second threshold    Follow-up MR imaging of the brain recommended for further assessment.    < end of copied text >

## 2024-02-01 NOTE — PROGRESS NOTE ADULT - SUBJECTIVE AND OBJECTIVE BOX
Aashish Boyer MD  Interventional Cardiology / Endovascular Specialist  [place] Office : [phone] [place] [id number] Tel:   [place] Office : [phone] [facility] [address] [address]  Tel: [phone]      Subjective/Overnight events: Patient lying in bed remains in MICU, on bipap getting EEG  	  MEDICATIONS:    piperacillin/tazobactam IVPB.. 3.375 Gram(s) IV Intermittent every 8 hours    albuterol/ipratropium for Nebulization 3 milliLiter(s) Nebulizer every 6 hours  sodium chloride 3%  Inhalation 4 milliLiter(s) Inhalation every 6 hours    aspirin Suppository 300 milliGRAM(s) Rectal daily  escitalopram 10 milliGRAM(s) Oral daily  levETIRAcetam  IVPB 500 milliGRAM(s) IV Intermittent every 12 hours    pantoprazole  Injectable 40 milliGRAM(s) IV Push every 12 hours  sucralfate suspension 1 Gram(s) Enteral Tube two times a day    dextrose 50% Injectable 25 Gram(s) IV Push once  dextrose 50% Injectable 25 Gram(s) IV Push once  dextrose 50% Injectable 12.5 Gram(s) IV Push once  dextrose Oral Gel 15 Gram(s) Oral once  glucagon  Injectable 1 milliGRAM(s) IntraMuscular once  insulin lispro (ADMELOG) corrective regimen sliding scale   SubCutaneous every 6 hours    artificial  tears Solution 1 Drop(s) Left EYE four times a day  chlorhexidine 2% Cloths 1 Application(s) Topical daily  dextrose 5%. 1000 milliLiter(s) IV Continuous <Continuous>  lidocaine   4% Patch 1 Patch Transdermal every 24 hours  petrolatum Ophthalmic Ointment 1 Application(s) Left EYE at bedtime      PAST MEDICAL/SURGICAL HISTORY  PAST MEDICAL & SURGICAL HISTORY:  Hyperlipemia      Hypertension      Coronary Artery Disease      Diabetes Mellitus Type II      Stented Coronary Artery  total 5 stents, last stent 5/2019      Neuropathy      Myocardial infarction      Stroke  mild left facial numbness   no other residuals verbalized      Myoclonic jerking      Stage 3 chronic kidney disease      History of Cataract Extraction      Hx of CABG      H/O coronary angiogram      S/P coronary artery stent placement  1/6/09      S/P placement of cardiac pacemaker          SOCIAL HISTORY: Substance Use (street drugs): ( x ) never used  (  ) other:    FAMILY HISTORY:  No pertinent family history in first degree relatives        PHYSICAL EXAM:  T(C): 36.3 (02-01-24 @ 08:00), Max: 37.2 (02-01-24 @ 04:00)  HR: 102 (02-01-24 @ 13:00) (93 - 110)  BP: 155/66 (02-01-24 @ 13:00) (94/75 - 155/66)  RR: 27 (02-01-24 @ 13:00) (15 - 27)  SpO2: 99% (02-01-24 @ 13:00) (95% - 100%)  Wt(kg): --  I&O's Summary    31 Jan 2024 07:01  -  01 Feb 2024 07:00  --------------------------------------------------------  IN: 200 mL / OUT: 435 mL / NET: -235 mL    01 Feb 2024 07:01  -  01 Feb 2024 13:35  --------------------------------------------------------  IN: 0 mL / OUT: 75 mL / NET: -75 mL          GENERAL: s/p extubation   EYES:  conjunctiva and sclera clear  ENMT: No tonsillar erythema, exudates, or enlargement  Cardiovascular: Normal S1 S2, No JVD, No murmurs, No edema  Respiratory: Lungs clear to auscultation	  Gastrointestinal:  Soft,   Extremities: No edema                                   7.4    13.91 )-----------( 417      ( 01 Feb 2024 01:31 )             25.4     02-01    151<H>  |  111<H>  |  40<H>  ----------------------------<  205<H>  4.5   |  29  |  1.87<H>    Ca    8.4      01 Feb 2024 01:31  Phos  5.1     02-01  Mg     2.50     02-01    TPro  6.8  /  Alb  2.6<L>  /  TBili  0.3  /  DBili  x   /  AST  64<H>  /  ALT  42<H>  /  AlkPhos  68  02-01    proBNP:   Lipid Profile:   HgA1c:   TSH:     Consultant(s) Notes Reviewed:  [x ] YES  [ ] NO    Care Discussed with Consultants/Other Providers [ x] YES  [ ] NO    Imaging Personally Reviewed independently:  [x] YES  [ ] NO    All labs, radiologic studies, vitals, orders and medications list reviewed. Patient is seen and examined at bedside. Case discussed with medical team.

## 2024-02-01 NOTE — PROGRESS NOTE ADULT - SUBJECTIVE AND OBJECTIVE BOX
Patient seen and examined.    on NIV   son at bedside     REVIEW OF SYSTEMS:  UTO intubated     MEDICATIONS  (STANDING):  albuterol/ipratropium for Nebulization 3 milliLiter(s) Nebulizer every 6 hours  artificial  tears Solution 1 Drop(s) Left EYE four times a day  aspirin  chewable 81 milliGRAM(s) Enteral Tube daily  chlorhexidine 0.12% Liquid 15 milliLiter(s) Oral Mucosa every 12 hours  chlorhexidine 2% Cloths 1 Application(s) Topical daily  dexMEDEtomidine Infusion 0.2 MICROgram(s)/kG/Hr (3.97 mL/Hr) IV Continuous <Continuous>  dextrose 50% Injectable 25 Gram(s) IV Push once  dextrose 50% Injectable 25 Gram(s) IV Push once  dextrose 50% Injectable 12.5 Gram(s) IV Push once  dextrose Oral Gel 15 Gram(s) Oral once  escitalopram 10 milliGRAM(s) Oral daily  glucagon  Injectable 1 milliGRAM(s) IntraMuscular once  heparin   Injectable 5000 Unit(s) SubCutaneous every 8 hours  insulin lispro (ADMELOG) corrective regimen sliding scale   SubCutaneous every 6 hours  insulin NPH human recombinant 3 Unit(s) SubCutaneous every 6 hours  levETIRAcetam  IVPB 250 milliGRAM(s) IV Intermittent every 12 hours  lidocaine   4% Patch 1 Patch Transdermal every 24 hours  pantoprazole  Injectable 40 milliGRAM(s) IV Push every 12 hours  petrolatum Ophthalmic Ointment 1 Application(s) Left EYE at bedtime  piperacillin/tazobactam IVPB.. 3.375 Gram(s) IV Intermittent every 8 hours  polyethylene glycol 3350 17 Gram(s) Oral daily  senna Syrup 10 milliLiter(s) Oral at bedtime  sodium chloride 3%  Inhalation 4 milliLiter(s) Inhalation every 6 hours  sucralfate suspension 1 Gram(s) Enteral Tube two times a day      VITAL:  T(C): , Max: 38.3 (01-28-24 @ 08:00)  T(F): , Max: 101 (01-28-24 @ 08:00)  HR: 86 (01-28-24 @ 10:00)  BP: 121/49 (01-28-24 @ 10:00)  BP(mean): 66 (01-28-24 @ 10:00)  RR: 21 (01-28-24 @ 10:00)  SpO2: 100% (01-28-24 @ 10:00)  Wt(kg): --    I and O's:    01-27 @ 07:01 - 01-28 @ 07:00  --------------------------------------------------------  IN: 2236.5 mL / OUT: 680 mL / NET: 1556.5 mL    01-28 @ 07:01 - 01-28 @ 10:20  --------------------------------------------------------  IN: 278.8 mL / OUT: 0 mL / NET: 278.8 mL          PHYSICAL EXAM:    Constitutional: on vent  HEENT:  intubated  Neck: No LAD, No JVD  Respiratory: diminished b/l  Cardiovascular: S1 and S2  Gastrointestinal: BS+, soft, NT/ND  Extremities: + l/e edema  Neurological: UTO  : + Moss  Skin: No rashes  Access: Not applicable      LABS:                        8.0    11.18 )-----------( 279      ( 28 Jan 2024 01:15 )             26.0     01-28    142  |  104  |  39<H>  ----------------------------<  324<H>  4.0   |  26  |  1.57<H>    Ca    8.2<L>      28 Jan 2024 01:15  Phos  3.5     01-28  Mg     2.30     01-28    TPro  6.5  /  Alb  2.6<L>  /  TBili  0.3  /  DBili  x   /  AST  11  /  ALT  12  /  AlkPhos  89  01-28      Urine Studies:  Urinalysis Basic - ( 28 Jan 2024 01:15 )    Color: x / Appearance: x / SG: x / pH: x  Gluc: 324 mg/dL / Ketone: x  / Bili: x / Urobili: x   Blood: x / Protein: x / Nitrite: x   Leuk Esterase: x / RBC: x / WBC x   Sq Epi: x / Non Sq Epi: x / Bacteria: x      Assessment and Plan:   · Assessment	  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  Renal fxn remains stable   Hypophosphatemia- stable  Hypokalemia - stable  Hypertensive urgency -resolved --- BP meds as ordered; BP acceptable   Pulm - resp failure - intubated  EEG pending   hypernatremia --improved/stable    1 Renal - crt worsening, no objection to lasix 40mg IV once  PRN , in case of distress (last give 1/30/24)  d-w d5w 75 cc/h but   add  free water 150 cc every 4h.  Trend Phos level daily   4 CV - BP controlled   5 Vasc - s/p SMA stent placement;   6 Sx - s/p HIDA + for acute asa, medical management, .tube feedings   7 GI - s/p EGD clipping of bleeding duodenal ulcers   8 Anemia-continue to trend H/H; suggest PRBC for Hgb <7.0; transfuse per primary team   9 Pul - intubated  [facility]   10- ID -afebrile   11 Glycemic control as able           [facility] Mayra  [facility]  Office: [phone]             Patient seen and examined.    on NIV   son at bedside     REVIEW OF SYSTEMS:  UTO intubated     MEDICATIONS  (STANDING):  albuterol/ipratropium for Nebulization 3 milliLiter(s) Nebulizer every 6 hours  artificial  tears Solution 1 Drop(s) Left EYE four times a day  aspirin  chewable 81 milliGRAM(s) Enteral Tube daily  chlorhexidine 0.12% Liquid 15 milliLiter(s) Oral Mucosa every 12 hours  chlorhexidine 2% Cloths 1 Application(s) Topical daily  dexMEDEtomidine Infusion 0.2 MICROgram(s)/kG/Hr (3.97 mL/Hr) IV Continuous <Continuous>  dextrose 50% Injectable 25 Gram(s) IV Push once  dextrose 50% Injectable 25 Gram(s) IV Push once  dextrose 50% Injectable 12.5 Gram(s) IV Push once  dextrose Oral Gel 15 Gram(s) Oral once  escitalopram 10 milliGRAM(s) Oral daily  glucagon  Injectable 1 milliGRAM(s) IntraMuscular once  heparin   Injectable 5000 Unit(s) SubCutaneous every 8 hours  insulin lispro (ADMELOG) corrective regimen sliding scale   SubCutaneous every 6 hours  insulin NPH human recombinant 3 Unit(s) SubCutaneous every 6 hours  levETIRAcetam  IVPB 250 milliGRAM(s) IV Intermittent every 12 hours  lidocaine   4% Patch 1 Patch Transdermal every 24 hours  pantoprazole  Injectable 40 milliGRAM(s) IV Push every 12 hours  petrolatum Ophthalmic Ointment 1 Application(s) Left EYE at bedtime  piperacillin/tazobactam IVPB.. 3.375 Gram(s) IV Intermittent every 8 hours  polyethylene glycol 3350 17 Gram(s) Oral daily  senna Syrup 10 milliLiter(s) Oral at bedtime  sodium chloride 3%  Inhalation 4 milliLiter(s) Inhalation every 6 hours  sucralfate suspension 1 Gram(s) Enteral Tube two times a day      VITAL:  T(C): , Max: 38.3 (01-28-24 @ 08:00)  T(F): , Max: 101 (01-28-24 @ 08:00)  HR: 86 (01-28-24 @ 10:00)  BP: 121/49 (01-28-24 @ 10:00)  BP(mean): 66 (01-28-24 @ 10:00)  RR: 21 (01-28-24 @ 10:00)  SpO2: 100% (01-28-24 @ 10:00)  Wt(kg): --    I and O's:    01-27 @ 07:01 - 01-28 @ 07:00  --------------------------------------------------------  IN: 2236.5 mL / OUT: 680 mL / NET: 1556.5 mL    01-28 @ 07:01 - 01-28 @ 10:20  --------------------------------------------------------  IN: 278.8 mL / OUT: 0 mL / NET: 278.8 mL          PHYSICAL EXAM:    Constitutional: no distress   HEENT: on bipap   Neck: No LAD, No JVD  Respiratory: diminished b/l  Cardiovascular: S1 and S2  Gastrointestinal: BS+, soft, NT/ND  Extremities: + l/e edema  Neurological: UTO  : + Moss  Skin: No rashes  Access: Not applicable      LABS:                        8.0    11.18 )-----------( 279      ( 28 Jan 2024 01:15 )             26.0     01-28    142  |  104  |  39<H>  ----------------------------<  324<H>  4.0   |  26  |  1.57<H>    Ca    8.2<L>      28 Jan 2024 01:15  Phos  3.5     01-28  Mg     2.30     01-28    TPro  6.5  /  Alb  2.6<L>  /  TBili  0.3  /  DBili  x   /  AST  11  /  ALT  12  /  AlkPhos  89  01-28      Urine Studies:  Urinalysis Basic - ( 28 Jan 2024 01:15 )    Color: x / Appearance: x / SG: x / pH: x  Gluc: 324 mg/dL / Ketone: x  / Bili: x / Urobili: x   Blood: x / Protein: x / Nitrite: x   Leuk Esterase: x / RBC: x / WBC x   Sq Epi: x / Non Sq Epi: x / Bacteria: x      Assessment and Plan:   · Assessment	  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  Renal fxn remains stable   Hypophosphatemia- stable  Hypokalemia - stable  Hypertensive urgency -resolved --- BP meds as ordered; BP acceptable   Pulm - resp failure - intubated  EEG pending   hypernatremia likely decrease free water     1 Renal - crt worsening, no objection to lasix 40mg IV once  PRN , in case of distress (last give 1/30/24)  d-w d5w 75 cc/h but   add  free water 150 cc every 4h once Ng tube in placed ( NPO for now )   Trend Phos level daily   4 CV - BP controlled   5 Vasc - s/p SMA stent placement;   6 Sx - s/p HIDA + for acute asa, medical management, .tube feedings   7 GI - s/p EGD clipping of bleeding duodenal ulcers -NPO  for now   8 Anemia-continue to trend H/H; suggest PRBC for Hgb <7.0; transfuse per primary team   9 Pul - intubated  [facility]   10- ID -afebrile   11 Glycemic control as able           Elsamayela Nunez  [facility]  Office: [phone]

## 2024-02-01 NOTE — PROGRESS NOTE ADULT - ASSESSMENT
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     PLAN  =====Neurologic=====  #CVA  - prior CVA x3 with no residual deficits  #myoclonic jerks  - on Keppra will continue  - holding home  gabapentin and memantine in setting of somnolence  - continue lexapro  - wean precedex as tolerated       =====Pulmonary=====  #COVID  - s/p paxlovid and 1 dose remdesivir while inpatient  #Pleural effusions b/l  - CT chest from 1/16 showing new small bilateral pleural effusions/ atelectasis/ patchy bilateral ground-glass opacities are consistent with pulmonary edema.  - currently on zosyn for aspiration PNA from 1/20- 1/28   - s/p bronch for increasing secretions- on duonebs and HTS currently, intermittent IPV BID   - bronchial cultures with staph aureus, continue zosyn   - intubated on 1/22 for airway support, currently off sedatives attempt to extubate   - improvement of effusions --> unlikely to need thora, will give lasix prior to extubation  - POCUS with resolution of b/l pleural effusions, still holding brilinta until next week possible reaccumulation requiring pleural tap     =====Cardiovascular=====  Patient does not currently require vasopressors and is normotensive  #HTN  - on home amlodipine and metoprolol currently holding     #CAD  - on Brilinta (holding) aspirin and ranolazine continue   - as per vascular okay to hold Brilinta for possible pleural effusion thoracentesis  -have not heard back from cardiology regarding Brilinta / last stent 2022 , will hold     =====GI=====  #upper GI bleed  - s/p EGD showing dieulafoy lesion and esophagitis likely 2/2 NGT irritation s/p 2 clips  - on protonix IV BID continue   - CT on 1/25 with cholelithiasis and sludge HIDA on 1/26 confirming likely acute choley, s/p IR perc cholecystostomy 1/26   - plan for PEG tube per familys GO    #Diet  - tube feeds NGT     =====Renal/=====  #Electrolytes  - Maintain K>4, Phos>3, Mag>2, iCal>1  - baseline cr 1.5, at baseline      =====Endocrine=====  #DM2  - on NPH 16 units q6 and SSI given solumedrol   - a1c 9.3, glucose wnl inpatient   - hypoglycemic overnight 1/26  started on d10 drips and insulin DC'd  - CTM w/ FSGs, will consider restarting insulin at lower dose  -NPH increasaed to 6u 1/28 due to hyperglycemia      =====Infectious Disease=====  #COVID   - s/p paxlovid and 1 dose remdesivir  # PNA  - CT chest showing ground glass opacities  - continue zosyn  - intermittent episodes of fevers, likely source GI given choleycystitis? culture NGTD  - ID consult 1/24-  CT maxillofacial CT head wnl  - CT chest with evolving GGO since 1/16    =====Heme/Onc=====  #DVT Ppx  - heparin sub-q    =====Ethics=====  FULL CODE. 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     PLAN  =====Neurologic=====  #CVA  - prior CVA x3 with no residual deficits  #myoclonic jerks  - on Keppra, increased per neuro  - holding home  gabapentin and memantine in setting of somnolence  - continue lexapro if able to take po   - wean precedex as tolerated   - f/u 24 hour EEG  - CT/CTA negative for stroke       =====Pulmonary=====  #COVID  - s/p paxlovid and 1 dose remdesivir while inpatient  #Pleural effusions b/l  - CT chest from 1/16 showing new small bilateral pleural effusions/ atelectasis/ patchy bilateral ground-glass opacities are consistent with pulmonary edema.  - s/p zosyn for aspiration PNA from 1/20- 1/28   - POCUS with resolution of b/l pleural effusions, still holding brilinta for possible reaccumulation requiring pleural tap     - trend blood gas/CO2 as pt on bipap and worsens may need repeat intubation     =====Cardiovascular=====  Patient does not currently require vasopressors and is normotensive  #HTN  - on home amlodipine and metoprolol currently holding     #CAD  - on Brilinta (holding) aspirin and ranolazine continue   - as per vascular okay to hold Brilinta for possible pleural effusion thoracentesis  -have not heard back from cardiology regarding Brilinta / last stent 2022 , will hold   - on asa suppository     #Afib  - pt with known history of pAF, first observed 2/1  - will consider starting AC and/or rate control if persists     =====GI=====  #upper GI bleed  - s/p EGD showing dieulafoy lesion and esophagitis likely 2/2 NGT irritation s/p 2 clips  - on protonix IV BID continue   - CT on 1/25 with cholelithiasis and sludge HIDA on 1/26 confirming likely acute choley, s/p IR perc cholecystostomy 1/26   - plan for PEG tube per familys GOC    #Diet  - tube feeds NGT     =====Renal/=====  #Electrolytes  - Maintain K>4, Phos>3, Mag>2, iCal>1  - baseline cr 1.5, at baseline  - on D5 for hyperNa and unable to get free water. Trend BMP/Na     =====Endocrine=====  #DM2  - on NPH 16 units q6 and SSI given solumedrol   - a1c 9.3, glucose wnl inpatient   - hypoglycemic overnight 1/26  started on d10 drips and insulin DC'd  - CTM w/ FSGs, will consider restarting insulin at lower dose  -NPH increasaed to 6u 1/28 due to hyperglycemia      =====Infectious Disease=====  #COVID   - s/p paxlovid and 1 dose remdesivir  # PNA  - CT chest showing ground glass opacities  - s/p zosyn  - intermittent episodes of fevers, likely source GI given choleycystitis? culture NGTD  - ID consult 1/24-  CT maxillofacial CT head wnl  - CT chest with evolving GGO since 1/16  - meropenem 5d course ending 2/1 at night     =====Heme/Onc=====  #DVT Ppx  - heparin sub-q    =====Ethics=====  FULL CODE.

## 2024-02-01 NOTE — PROGRESS NOTE ADULT - SUBJECTIVE AND OBJECTIVE BOX
Date of Service: 02-01-24 @ 11:12    Patient is a 89y old  Male who presents with a chief complaint of COVID19, Chest pain (01 Feb 2024 10:39)      Any change in ROS: pt is on bipap  : [id number]:  extubated yesterday  but then became hypercarbic:       MEDICATIONS  (STANDING):  albuterol/ipratropium for Nebulization 3 milliLiter(s) Nebulizer every 6 hours  artificial  tears Solution 1 Drop(s) Left EYE four times a day  aspirin Suppository 300 milliGRAM(s) Rectal daily  chlorhexidine 2% Cloths 1 Application(s) Topical daily  dextrose 5%. 1000 milliLiter(s) (75 mL/Hr) IV Continuous <Continuous>  dextrose 50% Injectable 25 Gram(s) IV Push once  dextrose 50% Injectable 25 Gram(s) IV Push once  dextrose 50% Injectable 12.5 Gram(s) IV Push once  dextrose Oral Gel 15 Gram(s) Oral once  escitalopram 10 milliGRAM(s) Oral daily  glucagon  Injectable 1 milliGRAM(s) IntraMuscular once  insulin lispro (ADMELOG) corrective regimen sliding scale   SubCutaneous every 6 hours  levETIRAcetam  IVPB 500 milliGRAM(s) IV Intermittent every 12 hours  lidocaine   4% Patch 1 Patch Transdermal every 24 hours  meropenem  IVPB 1000 milliGRAM(s) IV Intermittent every 12 hours  pantoprazole  Injectable 40 milliGRAM(s) IV Push every 12 hours  petrolatum Ophthalmic Ointment 1 Application(s) Left EYE at bedtime  sodium chloride 3%  Inhalation 4 milliLiter(s) Inhalation every 6 hours  sucralfate suspension 1 Gram(s) Enteral Tube two times a day    MEDICATIONS  (PRN):    Vital Signs Last 24 Hrs  T(C): 36.3 (01 Feb 2024 08:00), Max: 37.4 (31 Jan 2024 12:00)  T(F): 97.3 (01 Feb 2024 08:00), Max: 99.3 (31 Jan 2024 12:00)  HR: 107 (01 Feb 2024 11:09) (93 - 111)  BP: 144/60 (01 Feb 2024 10:00) (94/75 - 151/63)  BP(mean): 79 (01 Feb 2024 10:00) (67 - 96)  RR: 18 (01 Feb 2024 10:00) (15 - 26)  SpO2: 99% (01 Feb 2024 11:09) (95% - 100%)    Parameters below as of 01 Feb 2024 09:17  Patient On (Oxygen Delivery Method): BiPAP/CPAP        I&O's Summary    31 Jan 2024 07:01  -  01 Feb 2024 07:00  --------------------------------------------------------  IN: 200 mL / OUT: 435 mL / NET: -235 mL    01 Feb 2024 07:01  -  01 Feb 2024 11:12  --------------------------------------------------------  IN: 0 mL / OUT: 75 mL / NET: -75 mL          Physical Exam:   GENERAL: NAD, well-groomed, well-developed  HEENT: JAVAD/   Atraumatic, Normocephalic  ENMT: No tonsillar erythema, exudates, or enlargement; Moist mucous membranes, Good dentition, No lesions  NECK: Supple, No JVD, Normal thyroid  CHEST/LUNG: mild coarse rhonchi   CVS: Regular rate and rhythm; No murmurs, rubs, or gallops  GI: : Soft, Nontender, Nondistended; Bowel sounds present  NERVOUS SYSTEM:  Letahrgic : tries to open eyes   EXTREMITIES:+- edema  LYMPH: No lymphadenopathy noted  SKIN: No rashes or lesions  ENDOCRINOLOGY: No Thyromegaly  PSYCH: calm     Labs:  ABG - ( 01 Feb 2024 09:31 )  pH, Arterial: 7.33  pH, Blood: x     /  pCO2: 58    /  pO2: 109   / HCO3: 31    / Base Excess: 4.0   /  SaO2: 98.6            28, 31, 32, 30, 30, 33                            7.4    13.91 )-----------( 417      ( 01 Feb 2024 01:31 )             25.4                         7.9    15.56 )-----------( 332      ( 31 Jan 2024 08:13 )             26.4                         7.9    18.70 )-----------( 403      ( 31 Jan 2024 00:50 )             26.0                         7.9    15.90 )-----------( 345      ( 30 Jan 2024 02:00 )             25.9                         7.5    12.96 )-----------( 281      ( 29 Jan 2024 00:20 )             24.5     02-01    151<H>  |  111<H>  |  40<H>  ----------------------------<  205<H>  4.5   |  29  |  1.87<H>  01-31    147<H>  |  108<H>  |  38<H>  ----------------------------<  211<H>  4.6   |  25  |  1.64<H>  01-31    148<H>  |  108<H>  |  36<H>  ----------------------------<  188<H>  4.4   |  27  |  1.47<H>  01-30    145  |  110<H>  |  34<H>  ----------------------------<  122<H>  4.2   |  28  |  1.39<H>  01-29    147<H>  |  109<H>  |  36<H>  ----------------------------<  207<H>  3.9   |  29  |  1.50<H>    Ca    8.4      01 Feb 2024 01:31  Ca    8.1<L>      31 Jan 2024 08:13  Ca    7.9<L>      31 Jan 2024 00:50  Phos  5.1     02-01  Phos  5.0     01-31  Phos  4.3     01-31  Mg     2.50     02-01  Mg     2.40     01-31  Mg     2.40     01-31    TPro  6.8  /  Alb  2.6<L>  /  TBili  0.3  /  DBili  x   /  AST  64<H>  /  ALT  42<H>  /  AlkPhos  68  02-01  TPro  6.3  /  Alb  2.3<L>  /  TBili  0.3  /  DBili  x   /  AST  29  /  ALT  20  /  AlkPhos  68  01-31  TPro  6.5  /  Alb  2.5<L>  /  TBili  0.3  /  DBili  x   /  AST  16  /  ALT  11  /  AlkPhos  80  01-31  TPro  6.1  /  Alb  2.5<L>  /  TBili  0.2  /  DBili  x   /  AST  18  /  ALT  14  /  AlkPhos  99  01-30  TPro  5.8<L>  /  Alb  2.3<L>  /  TBili  0.2  /  DBili  x   /  AST  13  /  ALT  10  /  AlkPhos  112  01-29    CAPILLARY BLOOD GLUCOSE      POCT Blood Glucose.: 197 mg/dL (01 Feb 2024 05:40)  POCT Blood Glucose.: 193 mg/dL (01 Feb 2024 00:39)  POCT Blood Glucose.: 236 mg/dL (31 Jan 2024 18:20)  POCT Blood Glucose.: 225 mg/dL (31 Jan 2024 12:21)      LIVER FUNCTIONS - ( 01 Feb 2024 01:31 )  Alb: 2.6 g/dL / Pro: 6.8 g/dL / ALK PHOS: 68 U/L / ALT: 42 U/L / AST: 64 U/L / GGT: x           PT/INR - ( 01 Feb 2024 01:31 )   PT: 13.1 sec;   INR: 1.18 ratio         PTT - ( 01 Feb 2024 01:31 )  PTT:34.2 sec  Urinalysis Basic - ( 01 Feb 2024 01:31 )    Color: x / Appearance: x / SG: x / pH: x  Gluc: 205 mg/dL / Ketone: x  / Bili: x / Urobili: x   Blood: x / Protein: x / Nitrite: x   Leuk Esterase: x / RBC: x / WBC x   Sq Epi: x / Non Sq Epi: x / Bacteria: x            RECENT CULTURES:  01-26 @ 17:25 .Body Fluid gallbladder       No polymorphonuclear leukocytes per low power field  No organisms seen per oil power field       rad< from: CT Head No Cont (01.31.24 @ 21:00) >    Hypoperfusion is noted in the right MCA distribution at the 4 second    Tmax thresholds.    CBF<30% volume: 0 mL  Tmax>6.0 s volume: 0 mL  Mismatch volume: 0 mL  Mismatch ratio: None    IMPRESSION:      1. No CT evidence ofan acute territorial infarct or hemorrhage, with   underlying volume loss. No hemorrhage or mass identified.  2. Extensive calcified plaque in the head and neck.  3. Moderate to severe narrowing left internal carotid at the origin.   Severe narrowingright internal carotid by a tandem lesion 1.5 cm above   the bifurcation with a narrowed internal carotid beyond the lesion.  4. Extensive calcified plaque both cavernous and supraclinoid carotid   arteries with narrowing but no occlusion. Mild narrowing proximal right   M1 segment. Moderate narrowing both vertebral V4 segments  5. No perfusion abnormality at the Tmax 6 second threshold, but moderate   hypoperfusion in the right MCA territory at the 4 second threshold    Follow-up MR imaging of the brain recommended for further assessment.    --- End of Report ---      < end of copied text >  < from: Xray Chest 1 View- PORTABLE-Urgent (Xray Chest 1 View- PORTABLE-Urgent .) (01.31.24 @ 09:19) >    ACC: [id number] EXAM:  XR CHEST PORTABLE URGENT 1V   ORDERED BY: BELLA HUI     PROCEDURE DATE:  01/31/2024          INTERPRETATION:  CLINICAL INFORMATION: Hypoxia. Extubated..    TECHNIQUE: One view of the chest.    COMPARISON: Radiograph chest 1/29/2024.    FINDINGS:  Heart/Vascular: Heart size is poorly assessed on this projection. Left   sided pacemaker.  Pulmonary: Stable bilateral upper lung predominant opacities. Increased   right pleural effusion. No pneumothorax.  Bones: Median sternotomy. No acute osseous abnormalities    IMPRESSION:  Stable bilateral upper lung predominant opacities.    Increased right pleural effusion    --- End of Report ---          ESPERANZA PAN MD; Resident Radiologist  This document has been electronically signed.  AMELIA ANGLIN MD; Attending Radiologist  This document has been electronically signed. Jan 31 2024 10:19AM    < end of copied text >      No growth at 5 days          RESPIRATORY CULTURES:          Studies  Chest X-RAY  CT SCAN Chest   Venous Dopplers: LE:   CT Abdomen  Others

## 2024-02-01 NOTE — PROGRESS NOTE ADULT - ASSESSMENT
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 Patient states that he has been having a dry cough for the past week, worsened over the past few days. Denies SOB with the cough, denies hemoptysis. Reports fevers at home to 101.5 with associated diaphoresis. Said that he has significant pinprick like b/l chest pain with his cough but denies any chest pain when not coughing or with ambulation. Also reports rhinorrhea and sore throat. Reports generalized weakness and poor appetite. States his daughter was visiting him over the week end last week and she was positive for COVID19. Patient tested positive for COVID19 2 days prior and was started on paxlovid as outpatient. Of note, family states that the patient has had worsening confusion at home over the past 6 months (becoming disoriented to time) but recently has been more confused, talking about seeing people that are not present.   On arrival to the ED his vitals were T 98 -> 99.2, P 80, /72, RR 18, ) sat 100% RA. His lab work showed leukocytosis with neutrophilia and bandemia, MABLE, mild hyponatremia, indeterminant but stable troponins, and elevated lactate to 2.3. His COVID19 swab was positive. His CXR showed bibasilar crackles.  (23 Dec 2023 22:51):  pt has been here for past two weeks and now pulm called fro excessive secretions   he is able to answer simple questions:  grand sons at bedside:     Covid / Resp failure/on 2 L of oxygen  :  CADS/P CABG  DM  CKD  HTN  CVA X 3    Acute Cholecystitis   -New:  s/p perc asa tune:   -on antibtiocs:   -on no vasopressors:    -id following   1/29 : cont antibiotics  1/30:  on antibtiocs : meropenem  2/1: now extubated:  but ac on chr hypercarbic resp failure:  started on bipap : lethargic today too :   Covid / Resp failure/on 2 L of oxygen:  -he was treated for covid before:   -he has excessive secretions  -keep o2 sao2 above 90%  -add BD and mucomyst: ;  -add mucinex:   1/10: he seems to be doing  ok: cont mucomyst and bd   1/11 ?: add benzonatate and Robitussin prm : dc dornase  1/12: seems OK: no sob:  no cough : no phlegm : has gurgling sound in throat:  looks comfortable  1/14: he is on room air:  with good sao2:  finished covid rx:  cont current rx:  high risk for aspiration as he has gurgling secretions in throat   1/15: would do ct chest he seems to have increasing effusions:  his ct scan from last week of december:  has not much of effusions: however will try lasix : [facility]    1/16: doing  ok : no os:b has secretions still : change to percussion bed:  cont bd  1/17: seems stable:  no sob: on 3 L of oxygen : should add ATC lasix to me as he has formed new pleural effusions:  echo with mod AS   1/18; dw pts son : who is a neurologist:  he has secretions in chest with atelectasis and yovani effusions with increased secretions:  the son requests bronchoscopy:  I have explained the advantages and disadvantages of the bronch at this age and he might not be able to be extubated soon after procedure:  but they want to go ahead with it: IP called   He will remain art risk for recurrent aspiration and atelectasis even after the procedure and they understand that    1/19: FOR BRONCH TODAY  : AGAIN CONFIRMED WITH NEUROLOGISTS SON  1/20 : events noted:  was successful extubated after the procedure:  but then he desaturated with increasing secretions and ended up on high flow and adm to MICU : on recent chest xray has mod large effusion on rigth side:  I think it needs a tap:  would defer to [facility]   ; [facility] cultures are still pending  1/21: dw fellow  consider tapping on rigth saide:  his PCP and son at bedside:  he is not obviously sob but still on 100%  high flow   1/22: he is doing poorly:  WBC increased:  bronch culturs with staph : nares with staph ? add vanco:  defer to MICU : in addition:  he has large effusion 0on rigth side: ? chest tube:  but brillinta needs to be stopped : [facility] attending  1/23: got intubated : sedated on vasopressors:  s/p brocnh : [facility] attending again  : possibly needs tap on rigth side   1/24: cont current rx:  defer to primary team  : s/p bronch   -cont current medications   1/28: seems same to me  : intubated and is on precedex:  cont current pulm care:  on antibiotics  1/29: on cpap trials today : for possible extubation;  son at bedside; cont antibiotics  1/30: seems to be doing ok : intubated and on precedex  : on cpap trials:  extubation per primary team  2/1: extubated:  on bipap:  monitor abg:  on meropenem  DM  monitor and control   CKD  -cont current meds   HTN   -controlled  1/28:   -not on vasopressors:  antihypertensives being held  1/29: remains hemodynamically stable   1/30 : blood pressure  1/31: remained off pressors  CVA X 3  -doing ok :     dw family  and team  1/28: currently intubated   1/29: on cpap trials for possible extubation   1/30: intubated:  and precedex  1/31; off precedex but still lethargic : defer to neurology / MICU    GI Bleed:   -secondary to Dieulafoy's lesion:  defer to primary team :    dvt prophylaxis:    dw son at bedside

## 2024-02-01 NOTE — PROGRESS NOTE ADULT - ASSESSMENT
89-yo M w/ PMH of CAD s/p CABG w/ stents, s/p PPM, DM, PVD, CVA, CKD, and myoclonic jerks, admitted on 12/23 i/s/o recent COVID. Partially treated for COVID w/ Paxlovid. Course complicated by CT C/A/P revealing findings suggestive of acute cholecystitis (12/24/23). 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 S/p exlap, mesenteric angiogram, and SMA stent (12/24). HIDA (12/29) supported the diagnosis of acute cholecystitis. Treated medically (Zosyn 12/24-31). Further c/b acute blood loss anemia 1/1, s/p pRBC. EGD on 1/2 w/ clipping. LUE hematoma noted 1/10 on venous Duplex, w/ visualization of complex, avascular fluid collection (5.3 x 2.7 x 3.7 cm), possibly hematoma. AMS noted on 1/16, w/ CT head finding suggestive of partial opacification of the L middle ear and mastoid air cells. CT chest on 1/16 revealed new small b/l pleural effusions a/w RML and RLL collapse. Patchy b/l GGO, c/f pulm edema. S/p BAL 1/19, revealing NRF w/ Staph aureus. Restarted on Zosyn 1/20. Repeat BAL 1/22 w/ no growth. Perc asa on 1/26.      Workup:  Covid on 12/23  Blood Cx (12/24, 1/20, 1/22): NGTD  Bronch Cx (119): MSSA, moderate yeast   Legionella urine Ag neg     Spiking fevers on 1/19 and again 1/23. Febrile until 1/25.  Leukocytosis increased on 1/20 8-->16-->21-->16-->17. Improved to 10 (1/26).    Antimicrobials:   Remdesivir 12/25-26  Zosyn 12/25-31, 1/20-->28  Cefepime 1/19-20  David 1/28-->    #Toxic metabolic encephalopathy  #Cholecystitis  #Acute hypoxic respiratory failure  #Pleural effusion  #Fever  #Leukocytosis  #MABLE on CKD  #Abnormal CT of head  #Conjunctival hemorrhage  #Hematoma  #Debility  - AMS and acute hypoxic resp failure, intubated. BAL 1/19 w/ S aureus on Zosyn, now repeat BAL 1/22 w/ no growth  - Recent COVID. Superimposed infection can be within differential.  - CT max/face w/ no drainable abscess. CTAP and NM showed acute asa. S/p perc asa 1/26.  - Fungitell and galactomannan neg.  - Would d/c meropenem and start Zosyn 3.375 g IV Q8H. F/u cultures.  - Currently extubated. Per family at bedside, w/ AMS compared to yesterday. Defer to MICU re: respiratory management.  - Monitor fever curve and WBC. Wean off oxygen per MICU management.    Plan discussed with MICU team.  Thank you for this consult. Inpatient ID team will follow.    Cheo Kelly MD, PhD  Attending Physician  Division of Infectious Diseases  Department of Medicine    Please contact through MS Teams message.  Office: [phone] (after 5 PM or weekend) .

## 2024-02-01 NOTE — CHART NOTE - NSCHARTNOTEFT_GEN_A_CORE
EEG preliminary read (not final) on the initial recording hour(s) = x 2.5    No seizures recorded.  GPDs with triphasic morphology are typically seen in the context of a relatively severe metabolic encephalopathic process.  Moderate slowing noted, nonspecific.  Final report to follow tomorrow morning after completion of study.    [facility] EEG Reading Room Ph#: [phone]  Epilepsy Answering Service after 5PM and before 8:30AM: Ph#: [phone] EEG preliminary read (not final) on the initial recording hour(s) = >3 hr    Continuous generalized periodic discharges (GPDs) mostly at 1-1.5 Hz, often with triphasic morphology. These can be seen in toxic-metabolic encephalopathies and indicate some risk of generalized seizures.  The above GPDs are low- to medium-amplitude and often bluntly contoured.    Occasional events of brief truncal flexion and/or leftward head turning at times with b/l shoulder shrug, possibly time-locked with a single discharge but not clear. Clinically not occurring in a periodic fashion.     The above was discussed with neurology.    Additional findings:  Moderate diffuse cerebral dysfunction is nonspecific in etiology.   Single-lead EKG shows irregular rhythm and tachycardia.      Final report to follow tomorrow morning after completion of study.    [facility] EEG Reading Room Ph#: [phone]  Epilepsy Answering Service after 5PM and before 8:30AM: Ph#: [phone]

## 2024-02-01 NOTE — CHART NOTE - NSCHARTNOTEFT_GEN_A_CORE
NEUROLOGY FOLLOW-UP:    Reviewed prelim EEG w/ epilepsy attending. Please refer to chart note for further details. Neurology service recommends starting VPA 1500mg x1 dose now and start maintenance dose of 500mg Q8H. Please check a VPA level 1 hour prior to morning dose tomorrow, 2/2/24. Spoke with son of pt and agrees w/ plan. Thank you.    Russ Sarkar   PGY-3  Department of Neurology  [facility] NEUROLOGY FOLLOW-UP:    EEG - GPDs but not definitely seizure - will give a trial of antiseizure medications and observe EEG and clinical response.   Reviewed prelim EEG w/ epilepsy attending. Please refer to chart note for further details. Neurology service recommends starting VPA 1500mg x1 dose now and start maintenance dose of 500mg Q8H. Please check a VPA level 1 hour prior to morning dose tomorrow, 2/2/24. Spoke with son of pt and agrees w/ plan. Thank you.    Russ Sarkar   PGY-3  Department of Neurology  [facility]  Thank you NEUROLOGY FOLLOW-UP:    EEG - GPDs but not definitely seizure - will give a trial of antiseizure medications and observe EEG and clinical response.  These findings were not seen on previous EEG.   Reviewed prelim EEG w/ epilepsy attending. Please refer to chart note for further details. Neurology service recommends starting VPA 1500mg x1 dose now and start maintenance dose of 500mg Q8H. Please check a VPA level 1 hour prior to morning dose tomorrow, 2/2/24. Spoke with son of pt and agrees w/ plan. Thank you.    Russ Sarkar   PGY-3  Department of Neurology  [facility] at [facility]  Thank you

## 2024-02-01 NOTE — CHART NOTE - NSCHARTNOTEFT_GEN_A_CORE
Patient being seen for malnutrition follow up. Spoke with RN and obtained subjective information from extensive chart review.     Diet, NPO with Tube Feed:   Tube Feeding Modality: Nasogastric  Glucerna 1.5 Prince (GLUCERNA1.5RTH)  Total Volume for 24 Hours (mL): 960  Continuous  Starting Tube Feed Rate {mL per Hour}: 10  Increase Tube Feed Rate by (mL): 10     Every 6 hours  Until Goal Tube Feed Rate (mL per Hour): 40  Tube Feed Duration (in Hours): 24  Tube Feed Start Time: 12:00 (01-25-24 @ 11:45)    TF Provides:  960mL total volume  1440 kcals  79g protein  729mL free water    Weight:                    Height:  69" /175.2cm                   Ideal Body Weight: 160lbs / 72.7kg  82kg (1/31)  80.2kg (1/29)  77.7kg (1/27)  78.9kg (1/26)  76.2kg (1/19)  74.7kg (12/27)    Nutrition Interval Events: Spoke with RN who reported that pt was extubated to Bipap, but has been difficult to wean of if it, therefore TF has been on hold. Weight trend reflective of fluid shifts with edema presently 1+ generalized. No GI distress reported; pt with fecal incontinence of loose/liquid stool with last BM 2/1. Pt remains at severe risk for malnutrition based on inadequate nutrition intake this admission with ongoing gluid accumulation. Please resume enteral feeding when medically able. RDN services to remain available as needed.    MEDICATIONS  (STANDING):  albuterol/ipratropium for Nebulization 3 milliLiter(s) Nebulizer every 6 hours  artificial  tears Solution 1 Drop(s) Left EYE four times a day  aspirin Suppository 300 milliGRAM(s) Rectal daily  chlorhexidine 2% Cloths 1 Application(s) Topical daily  dextrose 5%. 1000 milliLiter(s) (75 mL/Hr) IV Continuous <Continuous>  dextrose 50% Injectable 25 Gram(s) IV Push once  dextrose 50% Injectable 25 Gram(s) IV Push once  dextrose 50% Injectable 12.5 Gram(s) IV Push once  dextrose Oral Gel 15 Gram(s) Oral once  escitalopram 10 milliGRAM(s) Oral daily  glucagon  Injectable 1 milliGRAM(s) IntraMuscular once  insulin lispro (ADMELOG) corrective regimen sliding scale   SubCutaneous every 6 hours  levETIRAcetam  IVPB 500 milliGRAM(s) IV Intermittent every 12 hours  lidocaine   4% Patch 1 Patch Transdermal every 24 hours  pantoprazole  Injectable 40 milliGRAM(s) IV Push every 12 hours  petrolatum Ophthalmic Ointment 1 Application(s) Left EYE at bedtime  piperacillin/tazobactam IVPB.. 3.375 Gram(s) IV Intermittent every 8 hours  sodium chloride 3%  Inhalation 4 milliLiter(s) Inhalation every 6 hours  sucralfate suspension 1 Gram(s) Enteral Tube two times a day    Pertinent Labs:  02-01 Na 152 mmol/L<H> Glu 215 mg/dL<H> K+ 4.7 mmol/L Cr 1.87 mg/dL<H> BUN 42 mg/dL<H> Phos 4.7 mg/dL<H>  02-01 @ 12:26 POCT 216 mg/dL  02-01 @ 05:40 POCT 197 mg/dL  02-01 @ 00:39 POCT 193 mg/dL  01-31 @ 18:20 POCT 236 mg/dL      Skin: Intact      Estimated Needs (based on IBW - 72.7 kg):      20-25 KCals/kg = 0708-8021 KCals/d  1.4-1.6g protein/kg = 102-116g protein/d      Nutrition Diagnosis: Severe malnutrition  [x] ongoing    Goal(s):  1. Patient to meet > 75% estimated energy needs    Recommendations:   1. Please resume enteral feeding when medically feasible and appropriate.    Monitoring and Evaluation:   1. Monitor weights, labs, BMs, skin integrity, enteral tolerance and edema.  2. RD services to remain available.  Education:    [x] Not warranted at present    Amisha Driscoll, MS, RDN, CDN, CNSC on MS TEAMS or Pager #[phone]

## 2024-02-01 NOTE — PROGRESS NOTE ADULT - SUBJECTIVE AND OBJECTIVE BOX
INTERVAL HPI/OVERNIGHT EVENTS:    O/N:    SUBJECTIVE: Patient seen and examined at bedside.     CONSTITUTIONAL: No weakness, fevers or chills  EYES/ENT: No visual changes;  No vertigo or throat pain   NECK: No pain or stiffness  RESPIRATORY: No cough, wheezing, hemoptysis; No shortness of breath  CARDIOVASCULAR: No chest pain or palpitations  GASTROINTESTINAL: No abdominal or epigastric pain. No nausea, vomiting, or hematemesis; No diarrhea or constipation. No melena or hematochezia.  GENITOURINARY: No dysuria, frequency or hematuria  NEUROLOGICAL: No numbness or weakness  SKIN: No itching, rashes    OBJECTIVE:    VITAL SIGNS:  ICU Vital Signs Last 24 Hrs  T(C): 37.2 (01 Feb 2024 04:00), Max: 37.4 (31 Jan 2024 12:00)  T(F): 98.9 (01 Feb 2024 04:00), Max: 99.3 (31 Jan 2024 12:00)  HR: 104 (01 Feb 2024 07:12) (93 - 112)  BP: 137/69 (01 Feb 2024 07:00) (92/71 - 151/63)  BP(mean): 86 (01 Feb 2024 07:00) (67 - 96)  ABP: --  ABP(mean): --  RR: 23 (01 Feb 2024 07:00) (15 - 25)  SpO2: 100% (01 Feb 2024 07:12) (93% - 100%)    O2 Parameters below as of 01 Feb 2024 04:00  Patient On (Oxygen Delivery Method): BiPAP/CPAP              01-31 @ 07:01  -  02-01 @ 07:00  --------------------------------------------------------  IN: 200 mL / OUT: 435 mL / NET: -235 mL      CAPILLARY BLOOD GLUCOSE      POCT Blood Glucose.: 197 mg/dL (01 Feb 2024 05:40)      PHYSICAL EXAM:    General: NAD  HEENT: NC/AT; PERRL, clear conjunctiva  Neck: supple  Respiratory: CTA b/l  Cardiovascular: +S1/S2; RRR  Abdomen: soft, NT/ND; +BS x4  Extremities: WWP, 2+ peripheral pulses b/l; no LE edema  Skin: normal color and turgor; no rash  Neurological:    MEDICATIONS:  MEDICATIONS  (STANDING):  albuterol/ipratropium for Nebulization 3 milliLiter(s) Nebulizer every 6 hours  artificial  tears Solution 1 Drop(s) Left EYE four times a day  chlorhexidine 2% Cloths 1 Application(s) Topical daily  dextrose 50% Injectable 25 Gram(s) IV Push once  dextrose 50% Injectable 25 Gram(s) IV Push once  dextrose 50% Injectable 12.5 Gram(s) IV Push once  dextrose Oral Gel 15 Gram(s) Oral once  escitalopram 10 milliGRAM(s) Oral daily  glucagon  Injectable 1 milliGRAM(s) IntraMuscular once  insulin lispro (ADMELOG) corrective regimen sliding scale   SubCutaneous every 6 hours  levETIRAcetam  IVPB 500 milliGRAM(s) IV Intermittent every 12 hours  lidocaine   4% Patch 1 Patch Transdermal every 24 hours  meropenem  IVPB 1000 milliGRAM(s) IV Intermittent every 12 hours  pantoprazole  Injectable 40 milliGRAM(s) IV Push every 12 hours  petrolatum Ophthalmic Ointment 1 Application(s) Left EYE at bedtime  sodium chloride 3%  Inhalation 4 milliLiter(s) Inhalation every 6 hours  sucralfate suspension 1 Gram(s) Enteral Tube two times a day    MEDICATIONS  (PRN):      ALLERGIES:  Allergies    fluoroquinolone antibiotics (Other)  Cipro (Unknown)  Tegretol (Unknown)  carbamazepine (Other)    Intolerances        LABS:                        7.4    13.91 )-----------( 417      ( 01 Feb 2024 01:31 )             25.4     02-01    151<H>  |  111<H>  |  40<H>  ----------------------------<  205<H>  4.5   |  29  |  1.87<H>    Ca    8.4      01 Feb 2024 01:31  Phos  5.1     02-01  Mg     2.50     02-01    TPro  6.8  /  Alb  2.6<L>  /  TBili  0.3  /  DBili  x   /  AST  64<H>  /  ALT  42<H>  /  AlkPhos  68  02-01    PT/INR - ( 01 Feb 2024 01:31 )   PT: 13.1 sec;   INR: 1.18 ratio         PTT - ( 01 Feb 2024 01:31 )  PTT:34.2 sec  Urinalysis Basic - ( 01 Feb 2024 01:31 )    Color: x / Appearance: x / SG: x / pH: x  Gluc: 205 mg/dL / Ketone: x  / Bili: x / Urobili: x   Blood: x / Protein: x / Nitrite: x   Leuk Esterase: x / RBC: x / WBC x   Sq Epi: x / Non Sq Epi: x / Bacteria: x        RADIOLOGY & ADDITIONAL TESTS: Reviewed. INTERVAL HPI/OVERNIGHT EVENTS:    O/N: pt placed on bipap for hypercapnia     SUBJECTIVE: Patient seen and examined at bedside.     ROS unobtainable due to patient's current condition     OBJECTIVE:    VITAL SIGNS:  ICU Vital Signs Last 24 Hrs  T(C): 37.2 (01 Feb 2024 04:00), Max: 37.4 (31 Jan 2024 12:00)  T(F): 98.9 (01 Feb 2024 04:00), Max: 99.3 (31 Jan 2024 12:00)  HR: 104 (01 Feb 2024 07:12) (93 - 112)  BP: 137/69 (01 Feb 2024 07:00) (92/71 - 151/63)  BP(mean): 86 (01 Feb 2024 07:00) (67 - 96)  ABP: --  ABP(mean): --  RR: 23 (01 Feb 2024 07:00) (15 - 25)  SpO2: 100% (01 Feb 2024 07:12) (93% - 100%)    O2 Parameters below as of 01 Feb 2024 04:00  Patient On (Oxygen Delivery Method): BiPAP/CPAP              01-31 @ 07:01  -  02-01 @ 07:00  --------------------------------------------------------  IN: 200 mL / OUT: 435 mL / NET: -235 mL      CAPILLARY BLOOD GLUCOSE      POCT Blood Glucose.: 197 mg/dL (01 Feb 2024 05:40)      PHYSICAL EXAM:    General: NAD  HEENT: NC/AT; PERRL, Lconjunctival bleed, unchanged  Neck: supple  Respiratory: scattered wheezes and crackles   Cardiovascular: +S1/S2; RRR  Abdomen: soft, NT/ND; +BS x4  Extremities: WWP, 2+ peripheral pulses b/l; no LE edema  Skin: normal color and turgor; no rash  Neurological: ao 0-1, squeezes hands and opens eyes to command     MEDICATIONS:  MEDICATIONS  (STANDING):  albuterol/ipratropium for Nebulization 3 milliLiter(s) Nebulizer every 6 hours  artificial  tears Solution 1 Drop(s) Left EYE four times a day  chlorhexidine 2% Cloths 1 Application(s) Topical daily  dextrose 50% Injectable 25 Gram(s) IV Push once  dextrose 50% Injectable 25 Gram(s) IV Push once  dextrose 50% Injectable 12.5 Gram(s) IV Push once  dextrose Oral Gel 15 Gram(s) Oral once  escitalopram 10 milliGRAM(s) Oral daily  glucagon  Injectable 1 milliGRAM(s) IntraMuscular once  insulin lispro (ADMELOG) corrective regimen sliding scale   SubCutaneous every 6 hours  levETIRAcetam  IVPB 500 milliGRAM(s) IV Intermittent every 12 hours  lidocaine   4% Patch 1 Patch Transdermal every 24 hours  meropenem  IVPB 1000 milliGRAM(s) IV Intermittent every 12 hours  pantoprazole  Injectable 40 milliGRAM(s) IV Push every 12 hours  petrolatum Ophthalmic Ointment 1 Application(s) Left EYE at bedtime  sodium chloride 3%  Inhalation 4 milliLiter(s) Inhalation every 6 hours  sucralfate suspension 1 Gram(s) Enteral Tube two times a day    MEDICATIONS  (PRN):      ALLERGIES:  Allergies    fluoroquinolone antibiotics (Other)  Cipro (Unknown)  Tegretol (Unknown)  carbamazepine (Other)    Intolerances        LABS:                        7.4    13.91 )-----------( 417      ( 01 Feb 2024 01:31 )             25.4     02-01    151<H>  |  111<H>  |  40<H>  ----------------------------<  205<H>  4.5   |  29  |  1.87<H>    Ca    8.4      01 Feb 2024 01:31  Phos  5.1     02-01  Mg     2.50     02-01    TPro  6.8  /  Alb  2.6<L>  /  TBili  0.3  /  DBili  x   /  AST  64<H>  /  ALT  42<H>  /  AlkPhos  68  02-01    PT/INR - ( 01 Feb 2024 01:31 )   PT: 13.1 sec;   INR: 1.18 ratio         PTT - ( 01 Feb 2024 01:31 )  PTT:34.2 sec  Urinalysis Basic - ( 01 Feb 2024 01:31 )    Color: x / Appearance: x / SG: x / pH: x  Gluc: 205 mg/dL / Ketone: x  / Bili: x / Urobili: x   Blood: x / Protein: x / Nitrite: x   Leuk Esterase: x / RBC: x / WBC x   Sq Epi: x / Non Sq Epi: x / Bacteria: x        RADIOLOGY & ADDITIONAL TESTS: Reviewed.

## 2024-02-02 LAB
ALBUMIN SERPL ELPH-MCNC: 2.4 G/DL — LOW (ref 3.3–5)
ALBUMIN SERPL ELPH-MCNC: 2.6 G/DL — LOW (ref 3.3–5)
ALP SERPL-CCNC: 67 U/L — SIGNIFICANT CHANGE UP (ref 40–120)
ALP SERPL-CCNC: 72 U/L — SIGNIFICANT CHANGE UP (ref 40–120)
ALT FLD-CCNC: 61 U/L — HIGH (ref 4–41)
ALT FLD-CCNC: 70 U/L — HIGH (ref 4–41)
ANION GAP SERPL CALC-SCNC: 10 MMOL/L — SIGNIFICANT CHANGE UP (ref 7–14)
ANION GAP SERPL CALC-SCNC: 11 MMOL/L — SIGNIFICANT CHANGE UP (ref 7–14)
ANION GAP SERPL CALC-SCNC: 13 MMOL/L — SIGNIFICANT CHANGE UP (ref 7–14)
APTT BLD: 28.4 SEC — SIGNIFICANT CHANGE UP (ref 24.5–35.6)
AST SERPL-CCNC: 56 U/L — HIGH (ref 4–40)
AST SERPL-CCNC: 85 U/L — HIGH (ref 4–40)
BASE EXCESS BLDA CALC-SCNC: 2.2 MMOL/L — SIGNIFICANT CHANGE UP (ref -2–3)
BASOPHILS # BLD AUTO: 0.02 K/UL — SIGNIFICANT CHANGE UP (ref 0–0.2)
BASOPHILS NFR BLD AUTO: 0.2 % — SIGNIFICANT CHANGE UP (ref 0–2)
BILIRUB SERPL-MCNC: 0.2 MG/DL — SIGNIFICANT CHANGE UP (ref 0.2–1.2)
BILIRUB SERPL-MCNC: 0.3 MG/DL — SIGNIFICANT CHANGE UP (ref 0.2–1.2)
BLOOD GAS ARTERIAL - LYTES,HGB,ICA,LACT RESULT: SIGNIFICANT CHANGE UP
BLOOD GAS ARTERIAL COMPREHENSIVE RESULT: SIGNIFICANT CHANGE UP
BUN SERPL-MCNC: 37 MG/DL — HIGH (ref 7–23)
BUN SERPL-MCNC: 39 MG/DL — HIGH (ref 7–23)
BUN SERPL-MCNC: 39 MG/DL — HIGH (ref 7–23)
CALCIUM SERPL-MCNC: 8.3 MG/DL — LOW (ref 8.4–10.5)
CALCIUM SERPL-MCNC: 8.4 MG/DL — SIGNIFICANT CHANGE UP (ref 8.4–10.5)
CALCIUM SERPL-MCNC: 8.5 MG/DL — SIGNIFICANT CHANGE UP (ref 8.4–10.5)
CHLORIDE SERPL-SCNC: 108 MMOL/L — HIGH (ref 98–107)
CO2 BLDA-SCNC: 34 MMOL/L — HIGH (ref 19–24)
CO2 SERPL-SCNC: 25 MMOL/L — SIGNIFICANT CHANGE UP (ref 22–31)
CO2 SERPL-SCNC: 29 MMOL/L — SIGNIFICANT CHANGE UP (ref 22–31)
CO2 SERPL-SCNC: 30 MMOL/L — SIGNIFICANT CHANGE UP (ref 22–31)
CREAT SERPL-MCNC: 1.86 MG/DL — HIGH (ref 0.5–1.3)
CREAT SERPL-MCNC: 1.87 MG/DL — HIGH (ref 0.5–1.3)
CREAT SERPL-MCNC: 1.95 MG/DL — HIGH (ref 0.5–1.3)
EGFR: 32 ML/MIN/1.73M2 — LOW
EGFR: 34 ML/MIN/1.73M2 — LOW
EGFR: 34 ML/MIN/1.73M2 — LOW
EOSINOPHIL # BLD AUTO: 0.14 K/UL — SIGNIFICANT CHANGE UP (ref 0–0.5)
EOSINOPHIL NFR BLD AUTO: 1.6 % — SIGNIFICANT CHANGE UP (ref 0–6)
GAS PNL BLDA: SIGNIFICANT CHANGE UP
GLUCOSE BLDC GLUCOMTR-MCNC: 158 MG/DL — HIGH (ref 70–99)
GLUCOSE BLDC GLUCOMTR-MCNC: 233 MG/DL — HIGH (ref 70–99)
GLUCOSE BLDC GLUCOMTR-MCNC: 295 MG/DL — HIGH (ref 70–99)
GLUCOSE BLDC GLUCOMTR-MCNC: 306 MG/DL — HIGH (ref 70–99)
GLUCOSE BLDC GLUCOMTR-MCNC: 329 MG/DL — HIGH (ref 70–99)
GLUCOSE SERPL-MCNC: 215 MG/DL — HIGH (ref 70–99)
GLUCOSE SERPL-MCNC: 320 MG/DL — HIGH (ref 70–99)
GLUCOSE SERPL-MCNC: 327 MG/DL — HIGH (ref 70–99)
HCO3 BLDA-SCNC: 32 MMOL/L — HIGH (ref 21–28)
HCT VFR BLD CALC: 24.2 % — LOW (ref 39–50)
HCT VFR BLD CALC: 24.7 % — LOW (ref 39–50)
HGB BLD-MCNC: 7.1 G/DL — LOW (ref 13–17)
HGB BLD-MCNC: 7.6 G/DL — LOW (ref 13–17)
HOROWITZ INDEX BLDA+IHG-RTO: 100 — SIGNIFICANT CHANGE UP
IANC: 7.28 K/UL — SIGNIFICANT CHANGE UP (ref 1.8–7.4)
IMM GRANULOCYTES NFR BLD AUTO: 0.7 % — SIGNIFICANT CHANGE UP (ref 0–0.9)
INR BLD: 1.14 RATIO — SIGNIFICANT CHANGE UP (ref 0.85–1.18)
LYMPHOCYTES # BLD AUTO: 0.71 K/UL — LOW (ref 1–3.3)
LYMPHOCYTES # BLD AUTO: 8.2 % — LOW (ref 13–44)
MAGNESIUM SERPL-MCNC: 2.5 MG/DL — SIGNIFICANT CHANGE UP (ref 1.6–2.6)
MAGNESIUM SERPL-MCNC: 2.6 MG/DL — SIGNIFICANT CHANGE UP (ref 1.6–2.6)
MCHC RBC-ENTMCNC: 28.6 PG — SIGNIFICANT CHANGE UP (ref 27–34)
MCHC RBC-ENTMCNC: 29.3 GM/DL — LOW (ref 32–36)
MCHC RBC-ENTMCNC: 29.9 PG — SIGNIFICANT CHANGE UP (ref 27–34)
MCHC RBC-ENTMCNC: 30.8 GM/DL — LOW (ref 32–36)
MCV RBC AUTO: 97.2 FL — SIGNIFICANT CHANGE UP (ref 80–100)
MCV RBC AUTO: 97.6 FL — SIGNIFICANT CHANGE UP (ref 80–100)
MONOCYTES # BLD AUTO: 0.42 K/UL — SIGNIFICANT CHANGE UP (ref 0–0.9)
MONOCYTES NFR BLD AUTO: 4.9 % — SIGNIFICANT CHANGE UP (ref 2–14)
NEUTROPHILS # BLD AUTO: 7.28 K/UL — SIGNIFICANT CHANGE UP (ref 1.8–7.4)
NEUTROPHILS NFR BLD AUTO: 84.4 % — HIGH (ref 43–77)
NRBC # BLD: 0 /100 WBCS — SIGNIFICANT CHANGE UP (ref 0–0)
NRBC # BLD: 0 /100 WBCS — SIGNIFICANT CHANGE UP (ref 0–0)
NRBC # FLD: 0 K/UL — SIGNIFICANT CHANGE UP (ref 0–0)
NRBC # FLD: 0.02 K/UL — HIGH (ref 0–0)
PCO2 BLDA: 72 MMHG — CRITICAL HIGH (ref 35–48)
PH BLDA: 7.25 — LOW (ref 7.35–7.45)
PHOSPHATE SERPL-MCNC: 3.7 MG/DL — SIGNIFICANT CHANGE UP (ref 2.5–4.5)
PHOSPHATE SERPL-MCNC: 4.2 MG/DL — SIGNIFICANT CHANGE UP (ref 2.5–4.5)
PLATELET # BLD AUTO: 383 K/UL — SIGNIFICANT CHANGE UP (ref 150–400)
PLATELET # BLD AUTO: 398 K/UL — SIGNIFICANT CHANGE UP (ref 150–400)
PO2 BLDA: 94 MMHG — SIGNIFICANT CHANGE UP (ref 83–108)
POTASSIUM SERPL-MCNC: 4 MMOL/L — SIGNIFICANT CHANGE UP (ref 3.5–5.3)
POTASSIUM SERPL-MCNC: 4 MMOL/L — SIGNIFICANT CHANGE UP (ref 3.5–5.3)
POTASSIUM SERPL-MCNC: 4.2 MMOL/L — SIGNIFICANT CHANGE UP (ref 3.5–5.3)
POTASSIUM SERPL-SCNC: 4 MMOL/L — SIGNIFICANT CHANGE UP (ref 3.5–5.3)
POTASSIUM SERPL-SCNC: 4 MMOL/L — SIGNIFICANT CHANGE UP (ref 3.5–5.3)
POTASSIUM SERPL-SCNC: 4.2 MMOL/L — SIGNIFICANT CHANGE UP (ref 3.5–5.3)
PROT SERPL-MCNC: 6.3 G/DL — SIGNIFICANT CHANGE UP (ref 6–8.3)
PROT SERPL-MCNC: 6.8 G/DL — SIGNIFICANT CHANGE UP (ref 6–8.3)
PROTHROM AB SERPL-ACNC: 12.8 SEC — SIGNIFICANT CHANGE UP (ref 9.5–13)
RBC # BLD: 2.48 M/UL — LOW (ref 4.2–5.8)
RBC # BLD: 2.54 M/UL — LOW (ref 4.2–5.8)
RBC # FLD: 17.4 % — HIGH (ref 10.3–14.5)
RBC # FLD: 17.5 % — HIGH (ref 10.3–14.5)
SAO2 % BLDA: 98.8 % — HIGH (ref 94–98)
SODIUM SERPL-SCNC: 146 MMOL/L — HIGH (ref 135–145)
SODIUM SERPL-SCNC: 148 MMOL/L — HIGH (ref 135–145)
SODIUM SERPL-SCNC: 148 MMOL/L — HIGH (ref 135–145)
VALPROATE SERPL-MCNC: 14.5 UG/ML — LOW (ref 50–100)
WBC # BLD: 8.63 K/UL — SIGNIFICANT CHANGE UP (ref 3.8–10.5)
WBC # BLD: 9.2 K/UL — SIGNIFICANT CHANGE UP (ref 3.8–10.5)
WBC # FLD AUTO: 8.63 K/UL — SIGNIFICANT CHANGE UP (ref 3.8–10.5)
WBC # FLD AUTO: 9.2 K/UL — SIGNIFICANT CHANGE UP (ref 3.8–10.5)

## 2024-02-02 PROCEDURE — 99291 CRITICAL CARE FIRST HOUR: CPT | Mod: 25

## 2024-02-02 PROCEDURE — 99291 CRITICAL CARE FIRST HOUR: CPT

## 2024-02-02 PROCEDURE — 95720 EEG PHY/QHP EA INCR W/VEEG: CPT

## 2024-02-02 PROCEDURE — 71045 X-RAY EXAM CHEST 1 VIEW: CPT | Mod: 26

## 2024-02-02 PROCEDURE — 31500 INSERT EMERGENCY AIRWAY: CPT | Mod: GC

## 2024-02-02 PROCEDURE — 36556 INSERT NON-TUNNEL CV CATH: CPT | Mod: GC

## 2024-02-02 PROCEDURE — 99232 SBSQ HOSP IP/OBS MODERATE 35: CPT

## 2024-02-02 RX ORDER — INSULIN GLARGINE 100 [IU]/ML
15 INJECTION, SOLUTION SUBCUTANEOUS
Refills: 0 | Status: DISCONTINUED | OUTPATIENT
Start: 2024-02-02 | End: 2024-02-02

## 2024-02-02 RX ORDER — GLUCAGON INJECTION, SOLUTION 0.5 MG/.1ML
1 INJECTION, SOLUTION SUBCUTANEOUS ONCE
Refills: 0 | Status: DISCONTINUED | OUTPATIENT
Start: 2024-02-02 | End: 2024-03-27

## 2024-02-02 RX ORDER — CHLORHEXIDINE GLUCONATE 213 G/1000ML
15 SOLUTION TOPICAL EVERY 12 HOURS
Refills: 0 | Status: DISCONTINUED | OUTPATIENT
Start: 2024-02-02 | End: 2024-03-16

## 2024-02-02 RX ORDER — SODIUM CHLORIDE 9 MG/ML
1000 INJECTION, SOLUTION INTRAVENOUS
Refills: 0 | Status: DISCONTINUED | OUTPATIENT
Start: 2024-02-02 | End: 2024-03-27

## 2024-02-02 RX ORDER — PROPOFOL 10 MG/ML
30 INJECTION, EMULSION INTRAVENOUS
Qty: 1000 | Refills: 0 | Status: DISCONTINUED | OUTPATIENT
Start: 2024-02-02 | End: 2024-02-03

## 2024-02-02 RX ORDER — HEPARIN SODIUM 5000 [USP'U]/ML
5000 INJECTION INTRAVENOUS; SUBCUTANEOUS EVERY 8 HOURS
Refills: 0 | Status: COMPLETED | OUTPATIENT
Start: 2024-02-02 | End: 2024-02-11

## 2024-02-02 RX ORDER — DEXTROSE 50 % IN WATER 50 %
25 SYRINGE (ML) INTRAVENOUS ONCE
Refills: 0 | Status: DISCONTINUED | OUTPATIENT
Start: 2024-02-02 | End: 2024-03-27

## 2024-02-02 RX ORDER — INSULIN GLARGINE 100 [IU]/ML
15 INJECTION, SOLUTION SUBCUTANEOUS
Refills: 0 | Status: DISCONTINUED | OUTPATIENT
Start: 2024-02-02 | End: 2024-02-06

## 2024-02-02 RX ORDER — PROPOFOL 10 MG/ML
40 INJECTION, EMULSION INTRAVENOUS ONCE
Refills: 0 | Status: COMPLETED | OUTPATIENT
Start: 2024-02-02 | End: 2024-02-02

## 2024-02-02 RX ORDER — MIDAZOLAM HYDROCHLORIDE 1 MG/ML
4 INJECTION, SOLUTION INTRAMUSCULAR; INTRAVENOUS ONCE
Refills: 0 | Status: DISCONTINUED | OUTPATIENT
Start: 2024-02-02 | End: 2024-02-02

## 2024-02-02 RX ORDER — VALPROIC ACID (AS SODIUM SALT) 250 MG/5ML
1600 SOLUTION, ORAL ORAL ONCE
Refills: 0 | Status: COMPLETED | OUTPATIENT
Start: 2024-02-02 | End: 2024-02-02

## 2024-02-02 RX ORDER — NOREPINEPHRINE BITARTRATE/D5W 8 MG/250ML
0.05 PLASTIC BAG, INJECTION (ML) INTRAVENOUS
Qty: 16 | Refills: 0 | Status: DISCONTINUED | OUTPATIENT
Start: 2024-02-02 | End: 2024-02-08

## 2024-02-02 RX ORDER — VALPROIC ACID (AS SODIUM SALT) 250 MG/5ML
500 SOLUTION, ORAL ORAL
Refills: 0 | Status: DISCONTINUED | OUTPATIENT
Start: 2024-02-02 | End: 2024-02-03

## 2024-02-02 RX ORDER — FENTANYL CITRATE 50 UG/ML
50 INJECTION INTRAVENOUS ONCE
Refills: 0 | Status: DISCONTINUED | OUTPATIENT
Start: 2024-02-02 | End: 2024-02-02

## 2024-02-02 RX ORDER — DEXTROSE 50 % IN WATER 50 %
12.5 SYRINGE (ML) INTRAVENOUS ONCE
Refills: 0 | Status: DISCONTINUED | OUTPATIENT
Start: 2024-02-02 | End: 2024-03-27

## 2024-02-02 RX ORDER — BUMETANIDE 0.25 MG/ML
4 INJECTION INTRAMUSCULAR; INTRAVENOUS ONCE
Refills: 0 | Status: COMPLETED | OUTPATIENT
Start: 2024-02-02 | End: 2024-02-02

## 2024-02-02 RX ORDER — INSULIN LISPRO 100/ML
VIAL (ML) SUBCUTANEOUS EVERY 6 HOURS
Refills: 0 | Status: DISCONTINUED | OUTPATIENT
Start: 2024-02-02 | End: 2024-03-27

## 2024-02-02 RX ORDER — VALPROIC ACID (AS SODIUM SALT) 250 MG/5ML
750 SOLUTION, ORAL ORAL
Refills: 0 | Status: DISCONTINUED | OUTPATIENT
Start: 2024-02-02 | End: 2024-02-03

## 2024-02-02 RX ORDER — DEXTROSE 50 % IN WATER 50 %
15 SYRINGE (ML) INTRAVENOUS ONCE
Refills: 0 | Status: DISCONTINUED | OUTPATIENT
Start: 2024-02-02 | End: 2024-03-27

## 2024-02-02 RX ORDER — MIDAZOLAM HYDROCHLORIDE 1 MG/ML
2 INJECTION, SOLUTION INTRAMUSCULAR; INTRAVENOUS ONCE
Refills: 0 | Status: DISCONTINUED | OUTPATIENT
Start: 2024-02-02 | End: 2024-02-02

## 2024-02-02 RX ADMIN — PROPOFOL 14.3 MICROGRAM(S)/KG/MIN: 10 INJECTION, EMULSION INTRAVENOUS at 17:05

## 2024-02-02 RX ADMIN — PROPOFOL 40 MILLIGRAM(S): 10 INJECTION, EMULSION INTRAVENOUS at 16:05

## 2024-02-02 RX ADMIN — SODIUM CHLORIDE 100 MILLILITER(S): 9 INJECTION, SOLUTION INTRAVENOUS at 10:31

## 2024-02-02 RX ADMIN — PROPOFOL 14.3 MICROGRAM(S)/KG/MIN: 10 INJECTION, EMULSION INTRAVENOUS at 17:01

## 2024-02-02 RX ADMIN — SODIUM CHLORIDE 4 MILLILITER(S): 9 INJECTION INTRAMUSCULAR; INTRAVENOUS; SUBCUTANEOUS at 22:36

## 2024-02-02 RX ADMIN — SODIUM CHLORIDE 4 MILLILITER(S): 9 INJECTION INTRAMUSCULAR; INTRAVENOUS; SUBCUTANEOUS at 03:20

## 2024-02-02 RX ADMIN — PANTOPRAZOLE SODIUM 40 MILLIGRAM(S): 20 TABLET, DELAYED RELEASE ORAL at 18:01

## 2024-02-02 RX ADMIN — SODIUM CHLORIDE 4 MILLILITER(S): 9 INJECTION INTRAMUSCULAR; INTRAVENOUS; SUBCUTANEOUS at 16:59

## 2024-02-02 RX ADMIN — Medication 8: at 12:18

## 2024-02-02 RX ADMIN — Medication 55 MILLIGRAM(S): at 06:35

## 2024-02-02 RX ADMIN — MIDAZOLAM HYDROCHLORIDE 2 MILLIGRAM(S): 1 INJECTION, SOLUTION INTRAMUSCULAR; INTRAVENOUS at 16:03

## 2024-02-02 RX ADMIN — MIDAZOLAM HYDROCHLORIDE 4 MILLIGRAM(S): 1 INJECTION, SOLUTION INTRAMUSCULAR; INTRAVENOUS at 16:01

## 2024-02-02 RX ADMIN — Medication 66 MILLIGRAM(S): at 12:18

## 2024-02-02 RX ADMIN — PANTOPRAZOLE SODIUM 40 MILLIGRAM(S): 20 TABLET, DELAYED RELEASE ORAL at 06:35

## 2024-02-02 RX ADMIN — Medication 300 MILLIGRAM(S): at 12:28

## 2024-02-02 RX ADMIN — Medication 3.72 MICROGRAM(S)/KG/MIN: at 20:06

## 2024-02-02 RX ADMIN — Medication 57.5 MILLIGRAM(S): at 15:17

## 2024-02-02 RX ADMIN — HEPARIN SODIUM 5000 UNIT(S): 5000 INJECTION INTRAVENOUS; SUBCUTANEOUS at 14:57

## 2024-02-02 RX ADMIN — Medication 3 MILLILITER(S): at 22:36

## 2024-02-02 RX ADMIN — PROPOFOL 14.3 MICROGRAM(S)/KG/MIN: 10 INJECTION, EMULSION INTRAVENOUS at 16:59

## 2024-02-02 RX ADMIN — CHLORHEXIDINE GLUCONATE 15 MILLILITER(S): 213 SOLUTION TOPICAL at 18:01

## 2024-02-02 RX ADMIN — LIDOCAINE 1 PATCH: 4 CREAM TOPICAL at 09:36

## 2024-02-02 RX ADMIN — Medication 3: at 00:47

## 2024-02-02 RX ADMIN — FENTANYL CITRATE 50 MICROGRAM(S): 50 INJECTION INTRAVENOUS at 16:03

## 2024-02-02 RX ADMIN — CHLORHEXIDINE GLUCONATE 1 APPLICATION(S): 213 SOLUTION TOPICAL at 12:18

## 2024-02-02 RX ADMIN — Medication 55 MILLIGRAM(S): at 20:05

## 2024-02-02 RX ADMIN — Medication 1 DROP(S): at 00:47

## 2024-02-02 RX ADMIN — Medication 3.72 MICROGRAM(S)/KG/MIN: at 17:02

## 2024-02-02 RX ADMIN — Medication 4: at 18:14

## 2024-02-02 RX ADMIN — Medication 3 MILLILITER(S): at 11:17

## 2024-02-02 RX ADMIN — INSULIN GLARGINE 15 UNIT(S): 100 INJECTION, SOLUTION SUBCUTANEOUS at 12:28

## 2024-02-02 RX ADMIN — BUMETANIDE 132 MILLIGRAM(S): 0.25 INJECTION INTRAMUSCULAR; INTRAVENOUS at 20:09

## 2024-02-02 RX ADMIN — SODIUM CHLORIDE 4 MILLILITER(S): 9 INJECTION INTRAMUSCULAR; INTRAVENOUS; SUBCUTANEOUS at 11:18

## 2024-02-02 RX ADMIN — LIDOCAINE 1 PATCH: 4 CREAM TOPICAL at 20:08

## 2024-02-02 RX ADMIN — HEPARIN SODIUM 5000 UNIT(S): 5000 INJECTION INTRAVENOUS; SUBCUTANEOUS at 23:13

## 2024-02-02 RX ADMIN — LEVETIRACETAM 400 MILLIGRAM(S): 250 TABLET, FILM COATED ORAL at 06:35

## 2024-02-02 RX ADMIN — Medication 1 DROP(S): at 12:18

## 2024-02-02 RX ADMIN — Medication 4: at 06:35

## 2024-02-02 RX ADMIN — Medication 3 MILLILITER(S): at 16:59

## 2024-02-02 RX ADMIN — LEVETIRACETAM 400 MILLIGRAM(S): 250 TABLET, FILM COATED ORAL at 18:01

## 2024-02-02 RX ADMIN — PROPOFOL 14.3 MICROGRAM(S)/KG/MIN: 10 INJECTION, EMULSION INTRAVENOUS at 20:06

## 2024-02-02 RX ADMIN — Medication 1 DROP(S): at 18:01

## 2024-02-02 RX ADMIN — LIDOCAINE 1 PATCH: 4 CREAM TOPICAL at 07:44

## 2024-02-02 RX ADMIN — Medication 3 MILLILITER(S): at 03:18

## 2024-02-02 RX ADMIN — Medication 1 APPLICATION(S): at 23:13

## 2024-02-02 RX ADMIN — Medication 1 DROP(S): at 06:34

## 2024-02-02 NOTE — PROGRESS NOTE ADULT - SUBJECTIVE AND OBJECTIVE BOX
Date of Service: 02-02-24 @ 11:42    Patient is a 89y old  Male who presents with a chief complaint of COVID19, Chest pain (02 Feb 2024 09:53)      Any change in ROS: remains lethargic  remains on N IV:     MEDICATIONS  (STANDING):  albuterol/ipratropium for Nebulization 3 milliLiter(s) Nebulizer every 6 hours  artificial  tears Solution 1 Drop(s) Left EYE four times a day  aspirin Suppository 300 milliGRAM(s) Rectal daily  chlorhexidine 2% Cloths 1 Application(s) Topical daily  dextrose 5%. 1000 milliLiter(s) (50 mL/Hr) IV Continuous <Continuous>  dextrose 5%. 1000 milliLiter(s) (100 mL/Hr) IV Continuous <Continuous>  dextrose 5%. 1000 milliLiter(s) (100 mL/Hr) IV Continuous <Continuous>  dextrose 50% Injectable 25 Gram(s) IV Push once  dextrose 50% Injectable 25 Gram(s) IV Push once  dextrose 50% Injectable 12.5 Gram(s) IV Push once  escitalopram 10 milliGRAM(s) Oral daily  glucagon  Injectable 1 milliGRAM(s) IntraMuscular once  heparin   Injectable 5000 Unit(s) SubCutaneous every 8 hours  insulin glargine Injectable (LANTUS) 15 Unit(s) SubCutaneous <User Schedule>  insulin lispro (ADMELOG) corrective regimen sliding scale   SubCutaneous every 6 hours  levETIRAcetam  IVPB 500 milliGRAM(s) IV Intermittent every 12 hours  lidocaine   4% Patch 1 Patch Transdermal every 24 hours  pantoprazole  Injectable 40 milliGRAM(s) IV Push every 12 hours  petrolatum Ophthalmic Ointment 1 Application(s) Left EYE at bedtime  sodium chloride 3%  Inhalation 4 milliLiter(s) Inhalation every 6 hours  sucralfate suspension 1 Gram(s) Enteral Tube two times a day  valproate sodium  IVPB 750 milliGRAM(s) IV Intermittent every 12 hours  valproate sodium  IVPB 500 milliGRAM(s) IV Intermittent every 12 hours  valproate sodium  IVPB 1600 milliGRAM(s) IV Intermittent once    MEDICATIONS  (PRN):  dextrose Oral Gel 15 Gram(s) Oral once PRN Blood Glucose LESS THAN 70 milliGRAM(s)/deciliter    Vital Signs Last 24 Hrs  T(C): 36.1 (02 Feb 2024 08:00), Max: 36.9 (01 Feb 2024 20:00)  T(F): 97 (02 Feb 2024 08:00), Max: 98.5 (01 Feb 2024 20:00)  HR: 96 (02 Feb 2024 11:13) (92 - 125)  BP: 160/68 (02 Feb 2024 10:00) (108/78 - 160/68)  BP(mean): 92 (02 Feb 2024 10:00) (68 - 106)  RR: 16 (02 Feb 2024 10:00) (16 - 27)  SpO2: 100% (02 Feb 2024 11:13) (91% - 100%)    Parameters below as of 02 Feb 2024 10:00  Patient On (Oxygen Delivery Method): BiPAP/CPAP    O2 Concentration (%): 60    I&O's Summary    01 Feb 2024 07:01  -  02 Feb 2024 07:00  --------------------------------------------------------  IN: 2150 mL / OUT: 401 mL / NET: 1749 mL    02 Feb 2024 07:01  -  02 Feb 2024 11:42  --------------------------------------------------------  IN: 200 mL / OUT: 0 mL / NET: 200 mL          Physical Exam:   GENERAL: NAD, well-groomed, well-developed  HEENT: JAVAD/   Atraumatic, Normocephalic  ENMT: No tonsillar erythema, exudates, or enlargement; Moist mucous membranes, Good dentition, No lesions  NECK: Supple, No JVD, Normal thyroid  CHEST/LUNG: no wheezing  CVS: Regular rate and rhythm; No murmurs, rubs, or gallops  GI: : Soft, Nontender, Nondistended; Bowel sounds present  NERVOUS SYSTEM:  letahrgic on bipap  EXTREMITIES: -edema  LYMPH: No lymphadenopathy noted  SKIN: No rashes or lesions  ENDOCRINOLOGY: No Thyromegaly  PSYCH: calm     Labs:  ABG - ( 01 Feb 2024 16:29 )  pH, Arterial: 7.39  pH, Blood: x     /  pCO2: 49    /  pO2: 111   / HCO3: 30    / Base Excess: 4.2   /  SaO2: 99.3            28, 31, 32, 30, 30                            7.6    9.20  )-----------( 383      ( 02 Feb 2024 06:19 )             24.7                         7.4    13.91 )-----------( 417      ( 01 Feb 2024 01:31 )             25.4                         7.9    15.56 )-----------( 332      ( 31 Jan 2024 08:13 )             26.4                         7.9    18.70 )-----------( 403      ( 31 Jan 2024 00:50 )             26.0                         7.9    15.90 )-----------( 345      ( 30 Jan 2024 02:00 )             25.9     02-02    148<H>  |  108<H>  |  39<H>  ----------------------------<  320<H>  4.2   |  29  |  1.95<H>  02-01    151<H>  |  110<H>  |  39<H>  ----------------------------<  258<H>  4.2   |  27  |  1.88<H>  02-01    153<H>  |  111<H>  |  40<H>  ----------------------------<  216<H>  4.5   |  27  |  1.87<H>  02-01    152<H>  |  111<H>  |  42<H>  ----------------------------<  215<H>  4.7   |  28  |  1.87<H>  02-01    151<H>  |  111<H>  |  40<H>  ----------------------------<  205<H>  4.5   |  29  |  1.87<H>  01-31    147<H>  |  108<H>  |  38<H>  ----------------------------<  211<H>  4.6   |  25  |  1.64<H>  01-31    148<H>  |  108<H>  |  36<H>  ----------------------------<  188<H>  4.4   |  27  |  1.47<H>  01-30    145  |  110<H>  |  34<H>  ----------------------------<  122<H>  4.2   |  28  |  1.39<H>    Ca    8.5      02 Feb 2024 06:19  Ca    8.3<L>      01 Feb 2024 21:34  Ca    8.4      01 Feb 2024 16:29  Ca    8.5      01 Feb 2024 12:05  Ca    8.4      01 Feb 2024 01:31  Phos  4.2     02-02  Phos  4.5     02-01  Phos  4.7     02-01  Phos  5.1     02-01  Mg     2.60     02-02  Mg     2.50     02-01  Mg     2.60     02-01  Mg     2.50     02-01    TPro  6.8  /  Alb  2.6<L>  /  TBili  0.3  /  DBili  x   /  AST  85<H>  /  ALT  70<H>  /  AlkPhos  72  02-02  TPro  6.8  /  Alb  2.6<L>  /  TBili  0.3  /  DBili  x   /  AST  64<H>  /  ALT  42<H>  /  AlkPhos  68  02-01  TPro  6.3  /  Alb  2.3<L>  /  TBili  0.3  /  DBili  x   /  AST  29  /  ALT  20  /  AlkPhos  68  01-31  TPro  6.5  /  Alb  2.5<L>  /  TBili  0.3  /  DBili  x   /  AST  16  /  ALT  11  /  AlkPhos  80  01-31  TPro  6.1  /  Alb  2.5<L>  /  TBili  0.2  /  DBili  x   /  AST  18  /  ALT  14  /  AlkPhos  99  01-30    CAPILLARY BLOOD GLUCOSE      POCT Blood Glucose.: 329 mg/dL (02 Feb 2024 06:28)  POCT Blood Glucose.: 295 mg/dL (02 Feb 2024 00:37)  POCT Blood Glucose.: 239 mg/dL (01 Feb 2024 18:08)  POCT Blood Glucose.: 216 mg/dL (01 Feb 2024 12:26)      LIVER FUNCTIONS - ( 02 Feb 2024 06:19 )  Alb: 2.6 g/dL / Pro: 6.8 g/dL / ALK PHOS: 72 U/L / ALT: 70 U/L / AST: 85 U/L / GGT: x           PT/INR - ( 02 Feb 2024 06:19 )   PT: 12.8 sec;   INR: 1.14 ratio         PTT - ( 02 Feb 2024 06:19 )  PTT:28.4 sec  Urinalysis Basic - ( 02 Feb 2024 06:19 )    Color: x / Appearance: x / SG: x / pH: x  Gluc: 320 mg/dL / Ketone: x  / Bili: x / Urobili: x   Blood: x / Protein: x / Nitrite: x   Leuk Esterase: x / RBC: x / WBC x   Sq Epi: x / Non Sq Epi: x / Bacteria: x            RECENT CULTURES:  01-26 @ 17:25 .Body Fluid gallbladder       No polymorphonuclear leukocytes per low power field  No organisms seen per oil power field       rad< from: CT Angio Head w/ IV Cont (01.31.24 @ 20:59) >  in the perimesencephalic region left greater than right.    2 the basilar system is patent, but there is extensive plaque in both V4   segments, and there is minimal atherosclerotic change in the basilar   artery.    The venous sinuses are patent.    CT PERFUSION  rapid imaging was conducted.    No core infarct or significant delay in mean transit time is noted.    Hypoperfusion is noted in the right MCA distribution at the 4 second    Tmax thresholds.    CBF<30% volume: 0 mL  Tmax>6.0 s volume: 0 mL  Mismatch volume: 0 mL  Mismatch ratio: None    IMPRESSION:      1. No CT evidence ofan acute territorial infarct or hemorrhage, with   underlying volume loss. No hemorrhage or mass identified.  2. Extensive calcified plaque in the head and neck.  3. Moderate to severe narrowing left internal carotid at the origin.   Severe narrowingright internal carotid by a tandem lesion 1.5 cm above   the bifurcation with a narrowed internal carotid beyond the lesion.  4. Extensive calcified plaque both cavernous and supraclinoid carotid   arteries with narrowing but no occlusion. Mild narrowing proximal right   M1 segment. Moderate narrowing both vertebral V4 segments  5. No perfusion abnormality at the Tmax 6 second threshold, but moderate   hypoperfusion in the right MCA territory at the 4 second threshold    Follow-up MR imaging of the brain recommended for further assessment.    --- End of Report ---        < end of copied text >      No growth at 5 days          RESPIRATORY CULTURES:          Studies  Chest X-RAY  CT SCAN Chest   Venous Dopplers: LE:   CT Abdomen  Others

## 2024-02-02 NOTE — PROGRESS NOTE ADULT - SUBJECTIVE AND OBJECTIVE BOX
Follow Up:    Cholecystitis    Interval History/ROS:  Afebrile ON. Last fever 1/29.    Allergies  fluoroquinolone antibiotics (Other)  Cipro (Unknown)  Tegretol (Unknown)  carbamazepine (Other)        ANTIMICROBIALS:      OTHER MEDS:  MEDICATIONS  (STANDING):  albuterol/ipratropium for Nebulization 3 every 6 hours  aspirin Suppository 300 daily  dextrose 50% Injectable 25 once  dextrose 50% Injectable 25 once  dextrose 50% Injectable 12.5 once  dextrose Oral Gel 15 once  escitalopram 10 daily  glucagon  Injectable 1 once  insulin lispro (ADMELOG) corrective regimen sliding scale  every 6 hours  levETIRAcetam  IVPB 500 every 12 hours  pantoprazole  Injectable 40 every 12 hours  sodium chloride 3%  Inhalation 4 every 6 hours  sucralfate suspension 1 two times a day  valproate sodium  IVPB 500 every 8 hours      Vital Signs Last 24 Hrs  T(C): 36.1 (02 Feb 2024 08:00), Max: 36.9 (01 Feb 2024 20:00)  T(F): 97 (02 Feb 2024 08:00), Max: 98.5 (01 Feb 2024 20:00)  HR: 101 (02 Feb 2024 09:00) (92 - 125)  BP: 140/97 (02 Feb 2024 09:00) (108/78 - 156/83)  BP(mean): 106 (02 Feb 2024 09:00) (68 - 106)  RR: 20 (02 Feb 2024 09:00) (17 - 27)  SpO2: 98% (02 Feb 2024 09:00) (91% - 100%)    Parameters below as of 02 Feb 2024 09:00  Patient On (Oxygen Delivery Method): BiPAP/CPAP    O2 Concentration (%): 60    PHYSICAL EXAM:  Constitutional: non-toxic, no distress  HEAD/EYES: anicteric, no conjunctival injection  ENT:  supple, no thrush  Cardiovascular:   normal S1, S2, no murmur, no edema  Respiratory:  clear BS bilaterally, no wheezes, no rales  GI:  soft, non-tender, normal bowel sounds  :  no delong, no CVA tenderness  Musculoskeletal:  no synovitis, normal ROM  Neurologic: awake and alert, normal strength, no focal findings  Skin:  no rash, no erythema, no phlebitis  Heme/Onc: no lymphadenopathy   Psychiatric:  awake, alert, appropriate mood                                7.6    9.20  )-----------( 383      ( 02 Feb 2024 06:19 )             24.7       02-02    148<H>  |  108<H>  |  39<H>  ----------------------------<  320<H>  4.2   |  29  |  1.95<H>    Ca    8.5      02 Feb 2024 06:19  Phos  4.2     02-02  Mg     2.60     02-02    TPro  6.8  /  Alb  2.6<L>  /  TBili  0.3  /  DBili  x   /  AST  85<H>  /  ALT  70<H>  /  AlkPhos  72  02-02      Urinalysis Basic - ( 02 Feb 2024 06:19 )    Color: x / Appearance: x / SG: x / pH: x  Gluc: 320 mg/dL / Ketone: x  / Bili: x / Urobili: x   Blood: x / Protein: x / Nitrite: x   Leuk Esterase: x / RBC: x / WBC x   Sq Epi: x / Non Sq Epi: x / Bacteria: x        MICROBIOLOGY:  v  .Body Fluid gallbladder  01-26-24   No growth at 5 days  --    No polymorphonuclear leukocytes per low power field  No organisms seen per oil power field      .Blood Blood  01-24-24   No growth at 5 days  --  --      .Bronchial Bronchial  01-22-24   Normal Respiratory Aurelia present  --    Moderate polymorphonuclear leukocytes seen per low power field  No squamous epithelial cells per low power field  No organisms seen per oil power field      .Blood Blood-Peripheral  01-20-24   No growth at 5 days  --  --      .Blood Blood-Peripheral  01-20-24   No growth at 5 days  --  --      .Bronchial R MIDDLE LOBE  01-19-24   Numerous Staphylococcus aureus  Normal Respiratory Aurelia present  --  Staphylococcus aureus                RADIOLOGY:  Imaging below independently reviewed.  < from: CT Angio Neck w/ IV Cont (01.31.24 @ 21:00) >      IMPRESSION:      1. No CT evidence ofan acute territorial infarct or hemorrhage, with   underlying volume loss. No hemorrhage or mass identified.  2. Extensive calcified plaque in the head and neck.  3. Moderate to severe narrowing left internal carotid at the origin.   Severe narrowingright internal carotid by a tandem lesion 1.5 cm above   the bifurcation with a narrowed internal carotid beyond the lesion.  4. Extensive calcified plaque both cavernous and supraclinoid carotid   arteries with narrowing but no occlusion. Mild narrowing proximal right   M1 segment. Moderate narrowing both vertebral V4 segments  5. No perfusion abnormality at the Tmax 6 second threshold, but moderate   hypoperfusion in the right MCA territory at the 4 second threshold    Follow-up MR imaging of the brain recommended for further assessment.      < end of copied text >     Follow Up:    Cholecystitis    Interval History/ROS:  Afebrile ON. Last fever 1/29. Patient was seen and examined at bedside. +vEEG ongoing. Pending reintubation. ROS unable to assess.    Allergies  fluoroquinolone antibiotics (Other)  Cipro (Unknown)  Tegretol (Unknown)  carbamazepine (Other)        ANTIMICROBIALS:      OTHER MEDS:  MEDICATIONS  (STANDING):  albuterol/ipratropium for Nebulization 3 every 6 hours  aspirin Suppository 300 daily  dextrose 50% Injectable 25 once  dextrose 50% Injectable 25 once  dextrose 50% Injectable 12.5 once  dextrose Oral Gel 15 once  escitalopram 10 daily  glucagon  Injectable 1 once  insulin lispro (ADMELOG) corrective regimen sliding scale  every 6 hours  levETIRAcetam  IVPB 500 every 12 hours  pantoprazole  Injectable 40 every 12 hours  sodium chloride 3%  Inhalation 4 every 6 hours  sucralfate suspension 1 two times a day  valproate sodium  IVPB 500 every 8 hours      Vital Signs Last 24 Hrs  T(C): 36.1 (02 Feb 2024 08:00), Max: 36.9 (01 Feb 2024 20:00)  T(F): 97 (02 Feb 2024 08:00), Max: 98.5 (01 Feb 2024 20:00)  HR: 101 (02 Feb 2024 09:00) (92 - 125)  BP: 140/97 (02 Feb 2024 09:00) (108/78 - 156/83)  BP(mean): 106 (02 Feb 2024 09:00) (68 - 106)  RR: 20 (02 Feb 2024 09:00) (17 - 27)  SpO2: 98% (02 Feb 2024 09:00) (91% - 100%)    Parameters below as of 02 Feb 2024 09:00  Patient On (Oxygen Delivery Method): BiPAP/CPAP    O2 Concentration (%): 60    PHYSICAL EXAM:  Constitutional: Awake, +BiPAP  HEAD/EYES: L eye w/ subconjunctival hemorrhage; EEG leads  ENT:  +BiPAP mask  Cardiovascular:  normal S1, S2, no murmur, tachycardic  Respiratory:  coarse breath sounds  GI:  soft, nondistended  Extremity: no BLE edema                          7.6    9.20  )-----------( 383      ( 02 Feb 2024 06:19 )             24.7       02-02    148<H>  |  108<H>  |  39<H>  ----------------------------<  320<H>  4.2   |  29  |  1.95<H>    Ca    8.5      02 Feb 2024 06:19  Phos  4.2     02-02  Mg     2.60     02-02    TPro  6.8  /  Alb  2.6<L>  /  TBili  0.3  /  DBili  x   /  AST  85<H>  /  ALT  70<H>  /  AlkPhos  72  02-02      Urinalysis Basic - ( 02 Feb 2024 06:19 )    Color: x / Appearance: x / SG: x / pH: x  Gluc: 320 mg/dL / Ketone: x  / Bili: x / Urobili: x   Blood: x / Protein: x / Nitrite: x   Leuk Esterase: x / RBC: x / WBC x   Sq Epi: x / Non Sq Epi: x / Bacteria: x        MICROBIOLOGY:  v  .Body Fluid gallbladder  01-26-24   No growth at 5 days  --    No polymorphonuclear leukocytes per low power field  No organisms seen per oil power field      .Blood Blood  01-24-24   No growth at 5 days  --  --      .Bronchial Bronchial  01-22-24   Normal Respiratory Aurelia present  --    Moderate polymorphonuclear leukocytes seen per low power field  No squamous epithelial cells per low power field  No organisms seen per oil power field      .Blood Blood-Peripheral  01-20-24   No growth at 5 days  --  --      .Blood Blood-Peripheral  01-20-24   No growth at 5 days  --  --      .Bronchial R MIDDLE LOBE  01-19-24   Numerous Staphylococcus aureus  Normal Respiratory Aurelia present  --  Staphylococcus aureus                RADIOLOGY:  Imaging below independently reviewed.  < from: CT Angio Neck w/ IV Cont (01.31.24 @ 21:00) >      IMPRESSION:      1. No CT evidence ofan acute territorial infarct or hemorrhage, with   underlying volume loss. No hemorrhage or mass identified.  2. Extensive calcified plaque in the head and neck.  3. Moderate to severe narrowing left internal carotid at the origin.   Severe narrowingright internal carotid by a tandem lesion 1.5 cm above   the bifurcation with a narrowed internal carotid beyond the lesion.  4. Extensive calcified plaque both cavernous and supraclinoid carotid   arteries with narrowing but no occlusion. Mild narrowing proximal right   M1 segment. Moderate narrowing both vertebral V4 segments  5. No perfusion abnormality at the Tmax 6 second threshold, but moderate   hypoperfusion in the right MCA territory at the 4 second threshold    Follow-up MR imaging of the brain recommended for further assessment.      < end of copied text >

## 2024-02-02 NOTE — PROGRESS NOTE ADULT - SUBJECTIVE AND OBJECTIVE BOX
Neurology Progress Note    SUBJECTIVE/OBJECTIVE/INTERVAL EVENTS: Patient seen and examined at bedside w/ neuro attending and team.     INTERVAL HISTORY: Noted to have continued myoclonic activity overnight per EEG report.     REVIEW OF SYSTEMS: unable to assess due to current pt mental status     VITALS & EXAMINATION:  Vital Signs Last 24 Hrs  T(C): 36 (02 Feb 2024 12:00), Max: 36.9 (01 Feb 2024 20:00)  T(F): 96.8 (02 Feb 2024 12:00), Max: 98.5 (01 Feb 2024 20:00)  HR: 92 (02 Feb 2024 16:06) (92 - 117)  BP: 119/48 (02 Feb 2024 16:06) (108/78 - 160/68)  BP(mean): 64 (02 Feb 2024 16:06) (64 - 106)  RR: 19 (02 Feb 2024 16:06) (14 - 27)  SpO2: 100% (02 Feb 2024 16:06) (95% - 100%)    Parameters below as of 02 Feb 2024 15:00  Patient On (Oxygen Delivery Method): BiPAP/CPAP    O2 Concentration (%): 60    General:  Constitutional: Male, appears stated age, nontoxic, in distress   Head: Normocephalic;   Eyes: clear sclera on Right eye, Injected on Left eye  Extremities: No cyanosis;   Resp: on BIPAP    Neurological (>12):  MS: Eyes closed but opens to tactile stimuli. Does not track examiner of follow commands. Localizes w/ both arms in response to supraorbital pressure.   Language: Non-verbal  CNs: PERRL (R 2mm, L 2mm). No BTT b/l. Disconjugate gaze, no overt facial asymmetry b/l. Corneal reflex+   Motor: Both upper and lower extremities w/d to noxious stimuli   Reflexes L/R: B/l bicep/tricep are 1+. Otherwise, 0 throughout.   Toes: ?upgoing t/l    LABORATORY:  CBC                       7.6    9.20  )-----------( 383      ( 02 Feb 2024 06:19 )             24.7     Chem 02-02    148<H>  |  108<H>  |  39<H>  ----------------------------<  327<H>  4.0   |  30  |  1.87<H>    Ca    8.4      02 Feb 2024 11:59  Phos  4.2     02-02  Mg     2.60     02-02    TPro  6.8  /  Alb  2.6<L>  /  TBili  0.3  /  DBili  x   /  AST  85<H>  /  ALT  70<H>  /  AlkPhos  72  02-02    LFTs LIVER FUNCTIONS - ( 02 Feb 2024 06:19 )  Alb: 2.6 g/dL / Pro: 6.8 g/dL / ALK PHOS: 72 U/L / ALT: 70 U/L / AST: 85 U/L / GGT: x           Coagulopathy PT/INR - ( 02 Feb 2024 06:19 )   PT: 12.8 sec;   INR: 1.14 ratio      PTT - ( 02 Feb 2024 06:19 )  PTT:28.4 sec  Lipid Panel   A1c   Cardiac enzymes     U/A Urinalysis Basic - ( 02 Feb 2024 11:59 )    Color: x / Appearance: x / SG: x / pH: x  Gluc: 327 mg/dL / Ketone: x  / Bili: x / Urobili: x   Blood: x / Protein: x / Nitrite: x   Leuk Esterase: x / RBC: x / WBC x   Sq Epi: x / Non Sq Epi: x / Bacteria: x    CSF  Immunological  Other    STUDIES & IMAGING: (EEG, CT, MR, U/S, TTE/RICHARD):    EEG Classification / Summary:  Abnormal video-EEG in a lethargic patient.  -Intermittent generalized myoclonic seizures consisting of brief myoclonic jerks time-locked to a GPD: leftward head turn, at times with truncal and/or neck flexion, or b/l shoulder shrug  -Continuous GPDs at 1-1.5 Hz, often with triphasic morphology  -Moderate-severe diffuse slowing    Clinical Impression:  -Intermittent generalized myoclonic seizures consisting of brief myoclonic jerks: leftward head turn, at times with truncal and/or neck flexion, or b/l shoulder shrug  -Generalized ictal-interictal continuum. A pattern on the ictal-interictal continuum is a pattern that does not qualify as an electrographic seizure or electrographic status epilepticus, but there is a reasonable chance that it may be contributing to impaired alertness, causing other clinical symptoms, and/or contributing to neuronal injury.   -Moderate-severe diffuse cerebral dysfunction is nonspecific in etiology.   -Single-lead EKG shows irregularly irregular rhythm and tachycardia.

## 2024-02-02 NOTE — PROGRESS NOTE ADULT - ASSESSMENT
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 Patient states that he has been having a dry cough for the past week, worsened over the past few days. Denies SOB with the cough, denies hemoptysis. Reports fevers at home to 101.5 with associated diaphoresis. Said that he has significant pinprick like b/l chest pain with his cough but denies any chest pain when not coughing or with ambulation. Also reports rhinorrhea and sore throat. Reports generalized weakness and poor appetite. States his daughter was visiting him over the week end last week and she was positive for COVID19. Patient tested positive for COVID19 2 days prior and was started on paxlovid as outpatient. Of note, family states that the patient has had worsening confusion at home over the past 6 months (becoming disoriented to time) but recently has been more confused, talking about seeing people that are not present.   On arrival to the ED his vitals were T 98 -> 99.2, P 80, /72, RR 18, ) sat 100% RA. His lab work showed leukocytosis with neutrophilia and bandemia, MABLE, mild hyponatremia, indeterminant but stable troponins, and elevated lactate to 2.3. His COVID19 swab was positive. His CXR showed bibasilar crackles.  (23 Dec 2023 22:51):  pt has been here for past two weeks and now pulm called fro excessive secretions   he is able to answer simple questions:  grand sons at bedside:     Covid / Resp failure/on 2 L of oxygen  :  CADS/P CABG  DM  CKD  HTN  CVA X 3    Acute Cholecystitis   -New:  s/p perc asa tune:   -on antibiotics   -on no vasopressors:    -id following   1/29 : cont antibiotics  1/30:  on antibiotics : meropenem  2/1: now extubated:  but ac on chr hypercarbic resp failure:  started on bipap : lethargic today too :   2/2: remains extubated but on bipap : mental status with not much improvements   Covid / Resp failure/on 2 L of oxygen:  -he was treated for covid before:   -he has excessive secretions  -keep o2 sao2 above 90%  -add BD and mucomyst: ;  -add mucinex:   1/10: he seems to be doing  ok: cont mucomyst and bd   1/11 ?: add benzonatate and Robitussin prm : dc dornase  1/12: seems OK: no sob:  no cough : no phlegm : has gurgling sound in throat:  looks comfortable  1/14: he is on room air:  with good sao2:  finished covid rx:  cont current rx:  high risk for aspiration as he has gurgling secretions in throat   1/15: would do ct chest he seems to have increasing effusions:  his ct scan from last week of december:  has not much of effusions: however will try lasix : [facility]    1/16: doing  ok : no os:b has secretions still : change to percussion bed:  cont bd  1/17: seems stable:  no sob: on 3 L of oxygen : should add ATC lasix to me as he has formed new pleural effusions:  echo with mod AS   1/18; dw pts son : who is a neurologist:  he has secretions in chest with atelectasis and yovani effusions with increased secretions:  the son requests bronchoscopy:  I have explained the advantages and disadvantages of the bronch at this age and he might not be able to be extubated soon after procedure:  but they want to go ahead with it: IP called   He will remain art risk for recurrent aspiration and atelectasis even after the procedure and they understand that    1/19: FOR BRONCH TODAY  : AGAIN CONFIRMED WITH NEUROLOGISTS SON  1/20 : events noted:  was successful extubated after the procedure:  but then he desaturated with increasing secretions and ended up on high flow and adm to MICU : on recent chest xray has mod large effusion on rigth side:  I think it needs a tap:  would defer to [facility]   ; [facility]  1/21: dw fellow  consider tapping on rigth saide:  his PCP and son at bedside:  he is not obviously sob but still on 100%  high flow   1/22: he is doing poorly:  WBC increased:  bronch culturs with staph : nares with staph ? add vanco:  defer to MICU : in addition:  he has large effusion 0on rigth side: ? chest tube:  but brillinta needs to be stopped : [facility] attending  1/23: got intubated : sedated on vasopressors:  s/p brocnh : [facility] attending again  : possibly needs tap on rigth side   1/24: cont current rx:  defer to primary team  : s/p bronch   -cont current medications   1/28: seems same to me  : intubated and is on precedex:  cont current pulm care:  on antibiotics  1/29: on cpap trials today : for possible extubation;  son at bedside; cont antibiotics  1/30: seems to be doing ok : intubated and on precedex  : on cpap trials:  extubation per primary team  2/1: extubated:  on bipap:  monitor abg:  on meropenem  2/2: clinically looks the same:  not much improved mental status: ABG last night was pretty reasonable:  cont to monitor   DM  monitor and control   CKD  -cont current meds   HTN   -controlled  1/28:   -not on vasopressors:  antihypertensives being held  1/29: remains hemodynamically stable   1/30 : blood pressure  1/31: remained off pressors  2/2 hemodynamically stable: off pressors  CVA X 3  -doing ok :     dw family  and team  1/28: currently intubated   1/29: on cpap trials for possible extubation   1/30: intubated:  and precedex  1/31; off precedex but still lethargic : defer to neurology / MICU    2/2: neuro follo w up : has had cvx x 3 before  GI Bleed:   -secondary to Dieulafoy's lesion:  defer to primary team :    dvt prophylaxis:    dw team

## 2024-02-02 NOTE — PROGRESS NOTE ADULT - ATTENDING COMMENTS
Exam:  MS: Eyes open to stimulation.  No fix and follow.  No attempts at verbalizations.  No follow commands. Localizes with B arms in response to supraorbital pressure.  CN:  No BTT.  Eyes in right gaze, no left gaze.  Corneal reflex is intact and symmetric.  Eyes close symmetrically and strongly.  Pupils round,  2mm-->1mm, B.    Myoclonus? - left head turning - less prominent.      Motor/sensory:  Tone: arms with increased voluntary tone vs. extensor posturing.   Left leg w/d when checking left ankle reflex.      A/P  Mr. Foss is a 89 yo man with sudden worsening neurological status with focal findings.  Myoclonic seizures, ictal-interictal continuum - not status epilepticus.   Continue VPA and levetiracetam as above.   Please maximize propofol or midazolam for antiseizure effect.  Continue EEG.  Intracranial and extracranial atherosclerosis, h/o strokes. There could be a new underling stroke.   Aspirin  Statin  Continue levetiracetam 500 mg Q12H  D/W family; neurology resident d/w MICU team.    Thank you    There is a high probability of sudden, clinically significant, or life threatening deterioration in the patient’s condition which requires the highest level of physician preparedness to intervene urgently.  Critical care time:  50 minutes.

## 2024-02-02 NOTE — PROGRESS NOTE ADULT - ASSESSMENT
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) who presents to the hospital for COVID19 infection and chest pain  MABLE - Renal fxn slightly higher/ stable  Hypophosphatemia- improved/stable   Hypokalemia - improved/stable   Hypertensive urgency -resolved --- BP meds as ordered; BP acceptable   Pulm - resp failure -  EEG pending   Hypernatremia - improvingonD5    1 Renal - crt worsening, no objection to lasix 40mg IV once PRN , in case of distress (last give 1/30/24)  a/w d5w at 100 cc/h, continue free water 150 cc every 4h  Trend Phos level daily   4 CV - BP controlled   5 Vasc - s/p SMA stent placement;   6 Sx - s/p HIDA + for acute asa, medical management, .tube feedings   7 GI - s/p EGD clipping of bleeding duodenal ulcers - on TFs  8 Anemia-continue to trend H/H; suggest PRBC for Hgb <7.0; transfuse per primary team   9 Pulm -on bipap  10- ID -afebrile   11 Glycemic control as able        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) who presents to the hospital for COVID19 infection and chest pain  MABLE - Renal fxn slightly higher/ stable  Hypophosphatemia- improved/stable   Hypokalemia - improved/stable   Hypertensive urgency -resolved --- BP meds as ordered; BP acceptable   Pulm - resp failure - on bipap   EEG pending   Hypernatremia - improvingonD5    1 Renal - crt worsening, no objection to lasix 40mg IV once PRN , in case of distress (last give 1/30/24)  a/w d5w at 100 cc/h,   free water 150 cc every 4h once NGT placed ( NPO for now )   Trend Phos level daily   4 CV - BP controlled   5 Vasc - s/p SMA stent placement;   6 Sx - s/p HIDA + for acute asa, medical management  7 GI - s/p EGD clipping of bleeding duodenal ulcers   8 Anemia-continue to trend H/H; suggest PRBC for Hgb <7.0; transfuse per primary team   9 Pulm -on bipap  10- ID -afebrile   11 Glycemic control as able        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) who presents to the hospital for COVID19 infection and chest pain  MABLE - Renal fxn slightly higher/ stable  Hypophosphatemia- improved/stable   Hypokalemia - improved/stable   Hypertensive urgency -resolved --- BP meds as ordered; BP acceptable   Pulm - resp failure - on bipap   EEG pending   Hypernatremia - improving on D5    1 Renal - crt worsening, no objection to lasix 40mg IV once PRN, in case of distress (last given 1/30/24)  a/w d5w at 100 cc/h   free water 150 cc every 4h once NGT placed ( NPO for now )   Trend Phos level daily   4 CV - BP controlled   5 Vasc - s/p SMA stent placement;   6 Sx - s/p HIDA + for acute asa, medical management  7 GI - s/p EGD clipping of bleeding duodenal ulcers   8 Anemia-continue to trend H/H; suggest PRBC for Hgb <7.0; transfuse per primary team   9 Pulm -on bipap  10- ID -afebrile   11 Glycemic control as able

## 2024-02-02 NOTE — PROGRESS NOTE ADULT - SUBJECTIVE AND OBJECTIVE BOX
INTERVAL HPI/OVERNIGHT EVENTS:    O/N:    SUBJECTIVE: Patient seen and examined at bedside.     CONSTITUTIONAL: No weakness, fevers or chills  EYES/ENT: No visual changes;  No vertigo or throat pain   NECK: No pain or stiffness  RESPIRATORY: No cough, wheezing, hemoptysis; No shortness of breath  CARDIOVASCULAR: No chest pain or palpitations  GASTROINTESTINAL: No abdominal or epigastric pain. No nausea, vomiting, or hematemesis; No diarrhea or constipation. No melena or hematochezia.  GENITOURINARY: No dysuria, frequency or hematuria  NEUROLOGICAL: No numbness or weakness  SKIN: No itching, rashes    OBJECTIVE:    VITAL SIGNS:  ICU Vital Signs Last 24 Hrs  T(C): 36.7 (02 Feb 2024 04:00), Max: 36.9 (01 Feb 2024 20:00)  T(F): 98.1 (02 Feb 2024 04:00), Max: 98.5 (01 Feb 2024 20:00)  HR: 95 (02 Feb 2024 06:00) (92 - 125)  BP: 123/72 (02 Feb 2024 06:00) (123/72 - 155/66)  BP(mean): 80 (02 Feb 2024 06:00) (68 - 103)  ABP: --  ABP(mean): --  RR: 21 (02 Feb 2024 06:00) (17 - 27)  SpO2: 100% (02 Feb 2024 03:52) (91% - 100%)    O2 Parameters below as of 01 Feb 2024 22:07  Patient On (Oxygen Delivery Method): nasal cannula, high flow              02-01 @ 07:01  -  02-02 @ 07:00  --------------------------------------------------------  IN: 950 mL / OUT: 400 mL / NET: 550 mL      CAPILLARY BLOOD GLUCOSE      POCT Blood Glucose.: 329 mg/dL (02 Feb 2024 06:28)      PHYSICAL EXAM:    General: NAD  HEENT: NC/AT; PERRL, clear conjunctiva  Neck: supple  Respiratory: CTA b/l  Cardiovascular: +S1/S2; RRR  Abdomen: soft, NT/ND; +BS x4  Extremities: WWP, 2+ peripheral pulses b/l; no LE edema  Skin: normal color and turgor; no rash  Neurological:    MEDICATIONS:  MEDICATIONS  (STANDING):  albuterol/ipratropium for Nebulization 3 milliLiter(s) Nebulizer every 6 hours  artificial  tears Solution 1 Drop(s) Left EYE four times a day  aspirin Suppository 300 milliGRAM(s) Rectal daily  chlorhexidine 2% Cloths 1 Application(s) Topical daily  dextrose 5%. 1000 milliLiter(s) (100 mL/Hr) IV Continuous <Continuous>  dextrose 50% Injectable 25 Gram(s) IV Push once  dextrose 50% Injectable 25 Gram(s) IV Push once  dextrose 50% Injectable 12.5 Gram(s) IV Push once  dextrose Oral Gel 15 Gram(s) Oral once  escitalopram 10 milliGRAM(s) Oral daily  glucagon  Injectable 1 milliGRAM(s) IntraMuscular once  insulin lispro (ADMELOG) corrective regimen sliding scale   SubCutaneous every 6 hours  levETIRAcetam  IVPB 500 milliGRAM(s) IV Intermittent every 12 hours  lidocaine   4% Patch 1 Patch Transdermal every 24 hours  pantoprazole  Injectable 40 milliGRAM(s) IV Push every 12 hours  petrolatum Ophthalmic Ointment 1 Application(s) Left EYE at bedtime  sodium chloride 3%  Inhalation 4 milliLiter(s) Inhalation every 6 hours  sucralfate suspension 1 Gram(s) Enteral Tube two times a day  valproate sodium  IVPB 500 milliGRAM(s) IV Intermittent every 8 hours    MEDICATIONS  (PRN):      ALLERGIES:  Allergies    fluoroquinolone antibiotics (Other)  Cipro (Unknown)  Tegretol (Unknown)  carbamazepine (Other)    Intolerances        LABS:                        7.6    9.20  )-----------( 383      ( 02 Feb 2024 06:19 )             24.7     02-02    148<H>  |  108<H>  |  39<H>  ----------------------------<  320<H>  4.2   |  29  |  1.95<H>    Ca    8.5      02 Feb 2024 06:19  Phos  4.2     02-02  Mg     2.60     02-02    TPro  6.8  /  Alb  2.6<L>  /  TBili  0.3  /  DBili  x   /  AST  85<H>  /  ALT  70<H>  /  AlkPhos  72  02-02    PT/INR - ( 02 Feb 2024 06:19 )   PT: 12.8 sec;   INR: 1.14 ratio         PTT - ( 02 Feb 2024 06:19 )  PTT:28.4 sec  Urinalysis Basic - ( 02 Feb 2024 06:19 )    Color: x / Appearance: x / SG: x / pH: x  Gluc: 320 mg/dL / Ketone: x  / Bili: x / Urobili: x   Blood: x / Protein: x / Nitrite: x   Leuk Esterase: x / RBC: x / WBC x   Sq Epi: x / Non Sq Epi: x / Bacteria: x        RADIOLOGY & ADDITIONAL TESTS: Reviewed. INTERVAL HPI/OVERNIGHT EVENTS:    O/N: No overnight events.     SUBJECTIVE: Patient seen and examined at bedside.     ROS unobtainable due to patient's current condition     OBJECTIVE:    VITAL SIGNS:  ICU Vital Signs Last 24 Hrs  T(C): 36.7 (02 Feb 2024 04:00), Max: 36.9 (01 Feb 2024 20:00)  T(F): 98.1 (02 Feb 2024 04:00), Max: 98.5 (01 Feb 2024 20:00)  HR: 95 (02 Feb 2024 06:00) (92 - 125)  BP: 123/72 (02 Feb 2024 06:00) (123/72 - 155/66)  BP(mean): 80 (02 Feb 2024 06:00) (68 - 103)  ABP: --  ABP(mean): --  RR: 21 (02 Feb 2024 06:00) (17 - 27)  SpO2: 100% (02 Feb 2024 03:52) (91% - 100%)    O2 Parameters below as of 01 Feb 2024 22:07  Patient On (Oxygen Delivery Method): nasal cannula, high flow              02-01 @ 07:01  -  02-02 @ 07:00  --------------------------------------------------------  IN: 950 mL / OUT: 400 mL / NET: 550 mL      CAPILLARY BLOOD GLUCOSE      POCT Blood Glucose.: 329 mg/dL (02 Feb 2024 06:28)      PHYSICAL EXAM:    General: NAD, lethargic  HEENT: NC/AT; PERRL, clear conjunctiva  Neck: supple  Respiratory: coarse b/l   Cardiovascular: +S1/S2; RRR  Abdomen: soft, NT/ND; +BS x4  Extremities: WWP, 2+ peripheral pulses b/l; no LE edema  Skin: normal color and turgor; no rash  Neurological:    MEDICATIONS:  MEDICATIONS  (STANDING):  albuterol/ipratropium for Nebulization 3 milliLiter(s) Nebulizer every 6 hours  artificial  tears Solution 1 Drop(s) Left EYE four times a day  aspirin Suppository 300 milliGRAM(s) Rectal daily  chlorhexidine 2% Cloths 1 Application(s) Topical daily  dextrose 5%. 1000 milliLiter(s) (100 mL/Hr) IV Continuous <Continuous>  dextrose 50% Injectable 25 Gram(s) IV Push once  dextrose 50% Injectable 25 Gram(s) IV Push once  dextrose 50% Injectable 12.5 Gram(s) IV Push once  dextrose Oral Gel 15 Gram(s) Oral once  escitalopram 10 milliGRAM(s) Oral daily  glucagon  Injectable 1 milliGRAM(s) IntraMuscular once  insulin lispro (ADMELOG) corrective regimen sliding scale   SubCutaneous every 6 hours  levETIRAcetam  IVPB 500 milliGRAM(s) IV Intermittent every 12 hours  lidocaine   4% Patch 1 Patch Transdermal every 24 hours  pantoprazole  Injectable 40 milliGRAM(s) IV Push every 12 hours  petrolatum Ophthalmic Ointment 1 Application(s) Left EYE at bedtime  sodium chloride 3%  Inhalation 4 milliLiter(s) Inhalation every 6 hours  sucralfate suspension 1 Gram(s) Enteral Tube two times a day  valproate sodium  IVPB 500 milliGRAM(s) IV Intermittent every 8 hours    MEDICATIONS  (PRN):      ALLERGIES:  Allergies    fluoroquinolone antibiotics (Other)  Cipro (Unknown)  Tegretol (Unknown)  carbamazepine (Other)    Intolerances        LABS:                        7.6    9.20  )-----------( 383      ( 02 Feb 2024 06:19 )             24.7     02-02    148<H>  |  108<H>  |  39<H>  ----------------------------<  320<H>  4.2   |  29  |  1.95<H>    Ca    8.5      02 Feb 2024 06:19  Phos  4.2     02-02  Mg     2.60     02-02    TPro  6.8  /  Alb  2.6<L>  /  TBili  0.3  /  DBili  x   /  AST  85<H>  /  ALT  70<H>  /  AlkPhos  72  02-02    PT/INR - ( 02 Feb 2024 06:19 )   PT: 12.8 sec;   INR: 1.14 ratio         PTT - ( 02 Feb 2024 06:19 )  PTT:28.4 sec  Urinalysis Basic - ( 02 Feb 2024 06:19 )    Color: x / Appearance: x / SG: x / pH: x  Gluc: 320 mg/dL / Ketone: x  / Bili: x / Urobili: x   Blood: x / Protein: x / Nitrite: x   Leuk Esterase: x / RBC: x / WBC x   Sq Epi: x / Non Sq Epi: x / Bacteria: x        RADIOLOGY & ADDITIONAL TESTS: Reviewed.

## 2024-02-02 NOTE — PROGRESS NOTE ADULT - SUBJECTIVE AND OBJECTIVE BOX
NEPHROLOGY     Patient seen and examined.    MEDICATIONS  (STANDING):  albuterol/ipratropium for Nebulization 3 milliLiter(s) Nebulizer every 6 hours  artificial  tears Solution 1 Drop(s) Left EYE four times a day  aspirin Suppository 300 milliGRAM(s) Rectal daily  chlorhexidine 2% Cloths 1 Application(s) Topical daily  dextrose 5%. 1000 milliLiter(s) (100 mL/Hr) IV Continuous <Continuous>  dextrose 50% Injectable 25 Gram(s) IV Push once  dextrose 50% Injectable 25 Gram(s) IV Push once  dextrose 50% Injectable 12.5 Gram(s) IV Push once  dextrose Oral Gel 15 Gram(s) Oral once  escitalopram 10 milliGRAM(s) Oral daily  glucagon  Injectable 1 milliGRAM(s) IntraMuscular once  insulin lispro (ADMELOG) corrective regimen sliding scale   SubCutaneous every 6 hours  levETIRAcetam  IVPB 500 milliGRAM(s) IV Intermittent every 12 hours  lidocaine   4% Patch 1 Patch Transdermal every 24 hours  pantoprazole  Injectable 40 milliGRAM(s) IV Push every 12 hours  petrolatum Ophthalmic Ointment 1 Application(s) Left EYE at bedtime  sodium chloride 3%  Inhalation 4 milliLiter(s) Inhalation every 6 hours  sucralfate suspension 1 Gram(s) Enteral Tube two times a day  valproate sodium  IVPB 500 milliGRAM(s) IV Intermittent every 8 hours    VITALS:  T(C): , Max: 36.9 (02-01-24 @ 20:00)  T(F): , Max: 98.5 (02-01-24 @ 20:00)  HR: 101 (02-02-24 @ 09:00)  BP: 140/97 (02-02-24 @ 09:00)  BP(mean): 106 (02-02-24 @ 09:00)  RR: 20 (02-02-24 @ 09:00)  SpO2: 98% (02-02-24 @ 09:00)  Wt(kg): --  I and O's:    02-01 @ 07:01  -  02-02 @ 07:00  --------------------------------------------------------  IN: 2150 mL / OUT: 401 mL / NET: 1749 mL    02-02 @ 07:01  -  02-02 @ 09:48  --------------------------------------------------------  IN: 200 mL / OUT: 0 mL / NET: 200 mL    PHYSICAL EXAM:  Constitutional: no distress   HEENT: on bipap,+ NGT   Neck: No LAD, No JVD  Respiratory: diminished b/l  Cardiovascular: S1 and S2  Gastrointestinal: BS+, soft, NT/ND  Extremities: + l/e edema  Neurological: UTO  : + Moss  Skin: No rashes    LABS:                        7.6    9.20  )-----------( 383      ( 02 Feb 2024 06:19 )             24.7     02-02    148<H>  |  108<H>  |  39<H>  ----------------------------<  320<H>  4.2   |  29  |  1.95<H>    Ca    8.5      02 Feb 2024 06:19  Phos  4.2     02-02  Mg     2.60     02-02    TPro  6.8  /  Alb  2.6<L>  /  TBili  0.3  /  DBili  x   /  AST  85<H>  /  ALT  70<H>  /  AlkPhos  72  02-02   NEPHROLOGY     Patient seen and examined with family at bedside, on bipap, in no acute distress.     MEDICATIONS  (STANDING):  albuterol/ipratropium for Nebulization 3 milliLiter(s) Nebulizer every 6 hours  artificial  tears Solution 1 Drop(s) Left EYE four times a day  aspirin Suppository 300 milliGRAM(s) Rectal daily  chlorhexidine 2% Cloths 1 Application(s) Topical daily  dextrose 5%. 1000 milliLiter(s) (100 mL/Hr) IV Continuous <Continuous>  dextrose 50% Injectable 25 Gram(s) IV Push once  dextrose 50% Injectable 25 Gram(s) IV Push once  dextrose 50% Injectable 12.5 Gram(s) IV Push once  dextrose Oral Gel 15 Gram(s) Oral once  escitalopram 10 milliGRAM(s) Oral daily  glucagon  Injectable 1 milliGRAM(s) IntraMuscular once  insulin lispro (ADMELOG) corrective regimen sliding scale   SubCutaneous every 6 hours  levETIRAcetam  IVPB 500 milliGRAM(s) IV Intermittent every 12 hours  lidocaine   4% Patch 1 Patch Transdermal every 24 hours  pantoprazole  Injectable 40 milliGRAM(s) IV Push every 12 hours  petrolatum Ophthalmic Ointment 1 Application(s) Left EYE at bedtime  sodium chloride 3%  Inhalation 4 milliLiter(s) Inhalation every 6 hours  sucralfate suspension 1 Gram(s) Enteral Tube two times a day  valproate sodium  IVPB 500 milliGRAM(s) IV Intermittent every 8 hours    VITALS:  T(C): , Max: 36.9 (02-01-24 @ 20:00)  T(F): , Max: 98.5 (02-01-24 @ 20:00)  HR: 101 (02-02-24 @ 09:00)  BP: 140/97 (02-02-24 @ 09:00)  BP(mean): 106 (02-02-24 @ 09:00)  RR: 20 (02-02-24 @ 09:00)  SpO2: 98% (02-02-24 @ 09:00)  Wt(kg): --  I and O's:    02-01 @ 07:01  -  02-02 @ 07:00  --------------------------------------------------------  IN: 2150 mL / OUT: 401 mL / NET: 1749 mL    02-02 @ 07:01  -  02-02 @ 09:48  --------------------------------------------------------  IN: 200 mL / OUT: 0 mL / NET: 200 mL    PHYSICAL EXAM:  Constitutional: no distress   HEENT: on bipap  Neck: No LAD, No JVD  Respiratory: diminished b/l  Cardiovascular: S1 and S2  Gastrointestinal: BS+, soft, NT/ND  Extremities: + l/e edema  Neurological: UTO  : + Moss  Skin: No rashes    LABS:                        7.6    9.20  )-----------( 383      ( 02 Feb 2024 06:19 )             24.7     02-02    148<H>  |  108<H>  |  39<H>  ----------------------------<  320<H>  4.2   |  29  |  1.95<H>    Ca    8.5      02 Feb 2024 06:19  Phos  4.2     02-02  Mg     2.60     02-02    TPro  6.8  /  Alb  2.6<L>  /  TBili  0.3  /  DBili  x   /  AST  85<H>  /  ALT  70<H>  /  AlkPhos  72  02-02

## 2024-02-02 NOTE — PROGRESS NOTE ADULT - SUBJECTIVE AND OBJECTIVE BOX
Aashish Boyer MD  Interventional Cardiology / Endovascular Specialist  [place] Office : [phone] [place] [address] [place] [address]  Tel:   [place] Office : [address] [facility] [place] [address]  Tel: [phone]    Subjective/Overnight events: Patient lying in bed remains in MICU, on bipap   	  MEDICATIONS:  heparin   Injectable 5000 Unit(s) SubCutaneous every 8 hours      albuterol/ipratropium for Nebulization 3 milliLiter(s) Nebulizer every 6 hours  sodium chloride 3%  Inhalation 4 milliLiter(s) Inhalation every 6 hours    aspirin Suppository 300 milliGRAM(s) Rectal daily  escitalopram 10 milliGRAM(s) Oral daily  levETIRAcetam  IVPB 500 milliGRAM(s) IV Intermittent every 12 hours  valproate sodium  IVPB 750 milliGRAM(s) IV Intermittent <User Schedule>  valproate sodium  IVPB 500 milliGRAM(s) IV Intermittent <User Schedule>    pantoprazole  Injectable 40 milliGRAM(s) IV Push every 12 hours  sucralfate suspension 1 Gram(s) Enteral Tube two times a day    dextrose 50% Injectable 25 Gram(s) IV Push once  dextrose 50% Injectable 25 Gram(s) IV Push once  dextrose 50% Injectable 12.5 Gram(s) IV Push once  dextrose Oral Gel 15 Gram(s) Oral once PRN  glucagon  Injectable 1 milliGRAM(s) IntraMuscular once  insulin glargine Injectable (LANTUS) 15 Unit(s) SubCutaneous <User Schedule>  insulin lispro (ADMELOG) corrective regimen sliding scale   SubCutaneous every 6 hours    artificial  tears Solution 1 Drop(s) Left EYE four times a day  chlorhexidine 2% Cloths 1 Application(s) Topical daily  dextrose 5%. 1000 milliLiter(s) IV Continuous <Continuous>  dextrose 5%. 1000 milliLiter(s) IV Continuous <Continuous>  dextrose 5%. 1000 milliLiter(s) IV Continuous <Continuous>  lidocaine   4% Patch 1 Patch Transdermal every 24 hours  petrolatum Ophthalmic Ointment 1 Application(s) Left EYE at bedtime      PAST MEDICAL/SURGICAL HISTORY  PAST MEDICAL & SURGICAL HISTORY:  Hyperlipemia      Hypertension      Coronary Artery Disease      Diabetes Mellitus Type II      Stented Coronary Artery  total 5 stents, last stent 5/2019      Neuropathy      Myocardial infarction      Stroke  mild left facial numbness   no other residuals verbalized      Myoclonic jerking      Stage 3 chronic kidney disease      History of Cataract Extraction      Hx of CABG      H/O coronary angiogram      S/P coronary artery stent placement  1/6/09      S/P placement of cardiac pacemaker          SOCIAL HISTORY: Substance Use (street drugs): ( x ) never used  (  ) other:    FAMILY HISTORY:  No pertinent family history in first degree relatives    PHYSICAL EXAM:  T(C): 36 (02-02-24 @ 12:00), Max: 36.9 (02-01-24 @ 20:00)  HR: 92 (02-02-24 @ 16:06) (92 - 117)  BP: 119/48 (02-02-24 @ 16:06) (108/78 - 160/68)  RR: 19 (02-02-24 @ 16:06) (14 - 27)  SpO2: 100% (02-02-24 @ 16:06) (95% - 100%)  Wt(kg): --  I&O's Summary    01 Feb 2024 07:01  -  02 Feb 2024 07:00  --------------------------------------------------------  IN: 2150 mL / OUT: 401 mL / NET: 1749 mL    02 Feb 2024 07:01  -  02 Feb 2024 16:08  --------------------------------------------------------  IN: 1000 mL / OUT: 0 mL / NET: 1000 mL      GENERAL: s/p extubation   EYES:  conjunctiva and sclera clear  ENMT: No tonsillar erythema, exudates, or enlargement  Cardiovascular: Normal S1 S2, No JVD, systolic murmur +  Respiratory: b/l ronchi	  Gastrointestinal:  Soft,   Extremities: No edema                         7.6    9.20  )-----------( 383      ( 02 Feb 2024 06:19 )             24.7     02-02    148<H>  |  108<H>  |  39<H>  ----------------------------<  327<H>  4.0   |  30  |  1.87<H>    Ca    8.4      02 Feb 2024 11:59  Phos  4.2     02-02  Mg     2.60     02-02    TPro  6.8  /  Alb  2.6<L>  /  TBili  0.3  /  DBili  x   /  AST  85<H>  /  ALT  70<H>  /  AlkPhos  72  02-02    proBNP:   Lipid Profile:   HgA1c:   TSH:     Consultant(s) Notes Reviewed:  [x ] YES  [ ] NO    Care Discussed with Consultants/Other Providers [ x] YES  [ ] NO    Imaging Personally Reviewed independently:  [x] YES  [ ] NO    All labs, radiologic studies, vitals, orders and medications list reviewed. Patient is seen and examined at bedside. Case discussed with medical team.

## 2024-02-02 NOTE — PROGRESS NOTE ADULT - ASSESSMENT
89-yo M w/ PMH of CAD s/p CABG w/ stents, s/p PPM, DM, PVD, CVA, CKD, and myoclonic jerks, admitted on 12/23 i/s/o recent COVID. Partially treated for COVID w/ Paxlovid. Course complicated by CT C/A/P revealing findings suggestive of acute cholecystitis (12/24/23). 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 S/p exlap, mesenteric angiogram, and SMA stent (12/24). HIDA (12/29) supported the diagnosis of acute cholecystitis. Treated medically (Zosyn 12/24-31). Further c/b acute blood loss anemia 1/1, s/p pRBC. EGD on 1/2 w/ clipping. LUE hematoma noted 1/10 on venous Duplex, w/ visualization of complex, avascular fluid collection (5.3 x 2.7 x 3.7 cm), possibly hematoma. AMS noted on 1/16, w/ CT head finding suggestive of partial opacification of the L middle ear and mastoid air cells. CT chest on 1/16 revealed new small b/l pleural effusions a/w RML and RLL collapse. Patchy b/l GGO, c/f pulm edema. S/p BAL 1/19, revealing NRF w/ Staph aureus. Restarted on Zosyn 1/20. Repeat BAL 1/22 w/ no growth. Perc asa on 1/26.      Workup:  Covid on 12/23  Blood Cx (12/24, 1/20, 1/22): NGTD  Bronch Cx (119): MSSA, moderate yeast   Legionella urine Ag neg     Spiking fevers on 1/19 and again 1/23. Last fever 1/29.  Leukocytosis increased on 1/20 8-->16-->21-->16-->17. Improved to 10 (1/26).    Antimicrobials:   Remdesivir 12/25-26  Zosyn 12/25-31, 1/20-->28, 2/1-   Cefepime 1/19-20  David 1/28-->2/1    #Toxic metabolic encephalopathy  #Cholecystitis  #Acute hypoxic respiratory failure  #Pleural effusion  #Fever  #Leukocytosis  #MABLE on CKD  #Abnormal CT of head  #Conjunctival hemorrhage  #Hematoma  #Debility  - AMS and acute hypoxic resp failure, intubated. BAL 1/19 w/ S aureus on Zosyn, now repeat BAL 1/22 w/ no growth  - Recent COVID. Superimposed infection can be within differential.  - CT max/face w/ no drainable abscess. CTAP and NM showed acute asa. S/p perc asa 1/26.  - Fungitell and galactomannan neg.  - Continue with Zosyn 3.375 g IV Q8H. F/u cultures.  - Monitor fever curve and WBC. Wean off oxygen per MICU management.   89-yo M w/ PMH of CAD s/p CABG w/ stents, s/p PPM, DM, PVD, CVA, CKD, and myoclonic jerks, admitted on 12/23 i/s/o recent COVID. Partially treated for COVID w/ Paxlovid. Course complicated by CT C/A/P revealing findings suggestive of acute cholecystitis (12/24/23). 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 S/p exlap, mesenteric angiogram, and SMA stent (12/24). HIDA (12/29) supported the diagnosis of acute cholecystitis. Treated medically (Zosyn 12/24-31). Further c/b acute blood loss anemia 1/1, s/p pRBC. EGD on 1/2 w/ clipping. LUE hematoma noted 1/10 on venous Duplex, w/ visualization of complex, avascular fluid collection (5.3 x 2.7 x 3.7 cm), possibly hematoma. AMS noted on 1/16, w/ CT head finding suggestive of partial opacification of the L middle ear and mastoid air cells. CT chest on 1/16 revealed new small b/l pleural effusions a/w RML and RLL collapse. Patchy b/l GGO, c/f pulm edema. S/p BAL 1/19, revealing NRF w/ Staph aureus. Restarted on Zosyn 1/20. Repeat BAL 1/22 w/ no growth. Perc asa on 1/26. Last fever 1/29. Empirically switched to david 1/28, continued until 2/1.      Workup:  Covid on 12/23  Blood Cx (12/24, 1/20, 1/22): NGTD  Bronch Cx (119): MSSA, moderate yeast   Legionella urine Ag neg     Spiking fevers on 1/19 and again 1/23. Last fever 1/29.  Leukocytosis increased on 1/20 8-->16-->21-->16-->17. Improved to 10 (1/26).    Antimicrobials:   Remdesivir 12/25-26  Zosyn 12/25-31, 1/20-->28, 2/1  Cefepime 1/19-20  David 1/28-->2/1    #Toxic metabolic encephalopathy  #Seizure  #Cholecystitis  #Acute hypoxic respiratory failure  #Pleural effusion  #Fever  #Leukocytosis  #MABLE on CKD  #Abnormal CT of head  #Conjunctival hemorrhage  #Hematoma  #Debility  - AMS and acute hypoxic resp failure, intubated. BAL 1/19 w/ S aureus on Zosyn, now repeat BAL 1/22 w/ no growth  - Recent COVID. Superimposed infection can be within differential.  - CT max/face w/ no drainable abscess. CTAP and NM showed acute asa. S/p perc asa 1/26.  - Fungitell and galactomannan neg.  - Abx stopped per MICU. vEEG ongoing. If febrile or hemodynamic status changes, low threshold for restarting abx. Avoid meds that are known to lower seizure threshold.  - Monitor fever curve and WBC. Vent management per MICU.    Plan discussed with MICU staff.  Thank you for this consult.  I will be going off service. My colleagues in ID will cover. Please call back ID as needed for further assistance.    Cheo Kelly MD, PhD  Attending Physician  Division of Infectious Diseases  Department of Medicine    Please contact through MS Teams message.  Office: [phone] (after 5 PM or weekend)

## 2024-02-02 NOTE — PROGRESS NOTE ADULT - ATTENDING COMMENTS
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 Course c/b acute cholecystitis s/p perc asa 1/26 by IR    #Acute hypoxemic/hypercapnic respiratory failure  #MSSA Pneumonia  #Dysphagia  #R pleural effusion  #Acute cholecystitis  #Encephalopathy - Likely 2/2 delirium vs toxic metabolic vs seizures/CVA. CTH w/ perfusion with no evidence for acute infarct, notable for moderate to severe narrowing L internal carotid at the origin as well as severe narrowing R internal carotid no perfusion abnormality at the Tmax 6 second threshold, but moderate hypoperfusion in R MCA territory at the 4 second threshold. EEG overnight showed intermittent generalized myoclonic seizures and generalized ictal-interictal continuum.   #Afib - New, intermittent  #SMA stent   - Extubated 1/30, now on BIPAP. Monitor blood gases. May require reintubation of mental status does not improve or hypercapnia worsens  - Now extubated, will evaluate need for PEG if continued concern for aspiration  - s/p empiric Meropenem for now. Monitoring off abx, low threshold to restart  - Holding Brilinta for now, currently without PO access (cardiac stents >1 year ago). c/w ASA for now (changed to per rectum). Will resume Brilinta when able  - f/u EEG, c/w Keppra and Valproate. WIll f/u neurology recs  - MRI if more stable  - c/w D5, monitor BMP  - Start basal insulin Lantus, monitor FSG  - f//u Optho   - HSQ for DVT ppx    Neil Goode MD  Pulmonary & Critical Care

## 2024-02-02 NOTE — PROGRESS NOTE ADULT - ASSESSMENT
89M PMHx of HTN, HLD, MI, CAD s/p CABG s/p stents (last stent May 2022) on daily ASA and brilinta, s/p PPM, DM2, CKD stage 3 (baseline Cr 1.2-1.3 as per family), PVD, CVA x3 (without residual deficits except for mild left facial numbness), and 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 During hospitalization, pt was taken off Brilinta for a planned pleural tap of lung effusions/GI had plans for PEG tube placement. Pt was intubated and since extubation, it has been noted that pt has been completely "aphasic"- nonverbal and not following commands for which neurology has been consulted. LKW 7:30 PM 1/30/24. Also, it has been noted that there is a R facial droop and pt has been having less movements on the LUE/LLE and pt has been looking towards the right side more than his left side, and has been having "myoclonic jerks" in his shoulders with some extended and inward "posturing in his arms". Prior to extubation, he was following commands. At baseline, prior to hospital admission pt was using a cane but independent of ADLs. Sedation stopped at 7:30 PM 1/31/24. EEG was started on 2/1/24 which showed intermittent generalized myoclonic seizures consisting of brief myoclonic jerks in addition to generalized ictal-interictal continuum.     IMPRESSION: Acute change in mental status w/ reported aphasia and decreased movements on Left side and transient R facial droop after extubation possibly 2/2 seizure-like activity vs. vascular etiology in the setting of known paroxsymal Afib. Currently, EEG showing correlation of myoclonic activity w/ generalized periodic discharges.     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    89M PMHx of HTN, HLD, MI, CAD s/p CABG s/p stents (last stent May 2022) on daily ASA and brilinta, s/p PPM, DM2, CKD stage 3 (baseline Cr 1.2-1.3 as per family), PVD, CVA x3 (without residual deficits except for mild left facial numbness), and 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 During hospitalization, pt was taken off Brilinta for a planned pleural tap of lung effusions/GI had plans for PEG tube placement. Pt was intubated and since extubation, it has been noted that pt has been completely "aphasic"- nonverbal and not following commands for which neurology has been consulted. LKW 7:30 PM 1/30/24. Also, it has been noted that there is a R facial droop and pt has been having less movements on the LUE/LLE and pt has been looking towards the right side more than his left side, and has been having "myoclonic jerks" in his shoulders with some extended and inward "posturing in his arms". Prior to extubation, he was following commands. At baseline, prior to hospital admission pt was using a cane but independent of ADLs. Sedation stopped at 7:30 PM 1/31/24. EEG was started on 2/1/24 which showed intermittent generalized myoclonic seizures consisting of brief myoclonic jerks in addition to generalized ictal-interictal continuum.     IMPRESSION: Acute change in mental status w/ reported aphasia and decreased movements on Left side and transient R facial droop after extubation possibly 2/2 seizure-like activity vs. vascular etiology in the setting of known paroxsymal Afib. Currently, EEG showing correlation of myoclonic activity w/ generalized periodic discharges.     RECOMMENDATIONS:   [] Continue w/ vEEG  [x] Load Valproic acid 1600mg x1 (completed at 2/2/24 at 12pm)  [x] Increase Valproic acid to 750/500/750/500mg Q6H IV; previously on 500mg Q8H  [x] s/p Valproic acid 1500mg x1 (2/1/24)  [x] VPA level 14.6 (Albumin 2.6) on 2/2/24  [] Please check another VPA level on 2/3/24 morning 1 hour prior to dose   [x] Continue home ASA 300mg suppository QD  [] Start Atorvastatin per ASCVD risk; if stroke on MRI, then high-intensity dosage  [x] Continue levetiracetam 500mg IV Q12H  [] MRI brain w/ and w/o contrast only when medically stable  [] TTE study  [] Check HbA1c and lipid panel  [x] Telemonitoring  [] Check EKG for NV interval  [] Rest of care per MICU team    Case seen and d/w Neurology attending.

## 2024-02-02 NOTE — EEG REPORT - NS EEG TEXT BOX
SHAIK DONOHUE [facility]-[id number]     Study Date: 2/1/24 12:02 - 2/2/24 08:00  Duration: 19 hr 32 min  --------------------------------------------------------------------------------------------------  History:  CC/ HPI Patient is a 89y old  Male who presents with a chief complaint of COVID19, Chest pain (02 Feb 2024 07:32)    MEDICATIONS  (STANDING):  albuterol/ipratropium for Nebulization 3 milliLiter(s) Nebulizer every 6 hours  artificial  tears Solution 1 Drop(s) Left EYE four times a day  aspirin Suppository 300 milliGRAM(s) Rectal daily  chlorhexidine 2% Cloths 1 Application(s) Topical daily  dextrose 5%. 1000 milliLiter(s) (100 mL/Hr) IV Continuous <Continuous>  dextrose 50% Injectable 25 Gram(s) IV Push once  dextrose 50% Injectable 25 Gram(s) IV Push once  dextrose 50% Injectable 12.5 Gram(s) IV Push once  dextrose Oral Gel 15 Gram(s) Oral once  escitalopram 10 milliGRAM(s) Oral daily  glucagon  Injectable 1 milliGRAM(s) IntraMuscular once  insulin lispro (ADMELOG) corrective regimen sliding scale   SubCutaneous every 6 hours  levETIRAcetam  IVPB 500 milliGRAM(s) IV Intermittent every 12 hours  lidocaine   4% Patch 1 Patch Transdermal every 24 hours  pantoprazole  Injectable 40 milliGRAM(s) IV Push every 12 hours  petrolatum Ophthalmic Ointment 1 Application(s) Left EYE at bedtime  sodium chloride 3%  Inhalation 4 milliLiter(s) Inhalation every 6 hours  sucralfate suspension 1 Gram(s) Enteral Tube two times a day  valproate sodium  IVPB 500 milliGRAM(s) IV Intermittent every 8 hours    --------------------------------------------------------------------------------------------------  Study Interpretation:    [[[Abbreviation Key:  PDR=alpha rhythm/posterior dominant rhythm. A-P=anterior posterior.  Amplitude: ‘very low’:<20; ‘low’:20-49; ‘medium’:; ‘high’:>150uV.  Persistence for periodic/rhythmic patterns (% of epoch) ‘rare’:<1%; ‘occasional’:1-10%; ‘frequent’:10-50%; ‘abundant’:50-90%; ‘continuous’:>90%.  Persistence for sporadic discharges: ‘rare’:<1/hr; ‘occasional’:1/min-1/hr; ‘frequent’:>1/min; ‘abundant’:>1/10 sec.  RPP=rhythmic and periodic patterns; GRDA=generalized rhythmic delta activity; FIRDA=frontal intermittent GRDA; LRDA=lateralized rhythmic delta activity; TIRDA=temporal intermittent rhythmic delta activity;  LPD=PLED=lateralized periodic discharges; GPD=generalized periodic discharges; BIPDs =bilateral independent periodic discharges; Mf=multifocal; SIRPDs=stimulus induced rhythmic, periodic, or ictal appearing discharges; BIRDs=brief potentially ictal rhythmic discharges >4 Hz, lasting .5-10s; PFA (paroxysmal bursts >13 Hz or =8 Hz <10s).  Modifiers: +F=with fast component; +S=with spike component; +R=with rhythmic component.  S-B=burst suppression pattern.  Max=maximal. N1-drowsy; N2-stage II sleep; N3-slow wave sleep. SSS/BETS=small sharp spikes/benign epileptiform transients of sleep. HV=hyperventilation; PS=photic stimulation]]]    Daily EEG Visual Analysis    FINDINGS:      Background:  The background is continuous and symmetric. The predominant background in the most wakeful state consists of diffuse polymorphic delta slowing, intermittent theta. There is no posterior dominant rhythm.     Background Slowing:  Generalized slowing: As above  Focal slowing: None    State Changes:   Absent    Interictal Findings:  Continuous generalized sharp waves, often with triphasic morphology, periodic (GPDs) at 1-1.5 Hz. Some periods with temporary decrease in prevalence or disappearance of GPDs. GPDs are low- to medium-amplitude and often bluntly contoured.    Electrographic and Electroclinical seizures:  Intermittent brief myoclonic jerks consisting of leftward head turn, at times with truncal and/or neck flexion (often low-amplitude), less often with b/l shoulder shrug, time-locked to one of the ongoing GPDs. Movements are not periodic/rhythmic.    Other Clinical Events:  None    Activation Procedures:   Hyperventilation is not performed.    Photic stimulation is performed and does not elicit any abnormalities. GPDs are ongoing as above.    Artifacts:  Intermittent myogenic and movement artifacts are present.    EKG:  Single-lead EKG shows irregularly irregular rhythm and tachycardia. Occasional PVCs.    EEG Classification / Summary:  Abnormal video-EEG in a lethargic patient.  -Intermittent generalized myoclonic seizures consisting of brief myoclonic jerks time-locked to a GPD: leftward head turn, at times with truncal and/or neck flexion, or b/l shoulder shrug  -Continuous GPDs at 1-1.5 Hz, often with triphasic morphology  -Moderate-severe diffuse slowing    Clinical Impression:  -Intermittent generalized myoclonic seizures consisting of brief myoclonic jerks: leftward head turn, at times with truncal and/or neck flexion, or b/l shoulder shrug  -Generalized ictal-interictal continuum. A pattern on the ictal-interictal continuum is a pattern that does not qualify as an electrographic seizure or electrographic status epilepticus, but there is a reasonable chance that it may be contributing to impaired alertness, causing other clinical symptoms, and/or contributing to neuronal injury.   -Moderate-severe diffuse cerebral dysfunction is nonspecific in etiology.   -Single-lead EKG shows irregularly irregular rhythm and tachycardia.        -------------------------------------------------------------------------------------------------------    Karyn Escobar MD  Attending Physician, [facility]    -------------------------------------------------------------------------------------------------------    To reach EEG technologist:  Please use the pager number for the appropriate hospital or contact the .  At [facility] - Pager #: [phone]    To reach EEG-reading physician:  [facility] EEG Reading Room Phone #: [phone]  Epilepsy Answering Service after 5PM and before 8:30AM: Phone #: [phone]      SHAIK DONOHUE [facility]-[id number]     Study Date: 2/1/24 12:02 - 2/2/24 08:00  Duration: 19 hr 32 min  --------------------------------------------------------------------------------------------------  History:  CC/ HPI Patient is a 89y old  Male who presents with a chief complaint of COVID19, Chest pain (02 Feb 2024 07:32)    MEDICATIONS  (STANDING):  albuterol/ipratropium for Nebulization 3 milliLiter(s) Nebulizer every 6 hours  artificial  tears Solution 1 Drop(s) Left EYE four times a day  aspirin Suppository 300 milliGRAM(s) Rectal daily  chlorhexidine 2% Cloths 1 Application(s) Topical daily  dextrose 5%. 1000 milliLiter(s) (100 mL/Hr) IV Continuous <Continuous>  dextrose 50% Injectable 25 Gram(s) IV Push once  dextrose 50% Injectable 25 Gram(s) IV Push once  dextrose 50% Injectable 12.5 Gram(s) IV Push once  dextrose Oral Gel 15 Gram(s) Oral once  escitalopram 10 milliGRAM(s) Oral daily  glucagon  Injectable 1 milliGRAM(s) IntraMuscular once  insulin lispro (ADMELOG) corrective regimen sliding scale   SubCutaneous every 6 hours  levETIRAcetam  IVPB 500 milliGRAM(s) IV Intermittent every 12 hours  lidocaine   4% Patch 1 Patch Transdermal every 24 hours  pantoprazole  Injectable 40 milliGRAM(s) IV Push every 12 hours  petrolatum Ophthalmic Ointment 1 Application(s) Left EYE at bedtime  sodium chloride 3%  Inhalation 4 milliLiter(s) Inhalation every 6 hours  sucralfate suspension 1 Gram(s) Enteral Tube two times a day  valproate sodium  IVPB 500 milliGRAM(s) IV Intermittent every 8 hours    --------------------------------------------------------------------------------------------------  Study Interpretation:    [[[Abbreviation Key:  PDR=alpha rhythm/posterior dominant rhythm. A-P=anterior posterior.  Amplitude: ‘very low’:<20; ‘low’:20-49; ‘medium’:; ‘high’:>150uV.  Persistence for periodic/rhythmic patterns (% of epoch) ‘rare’:<1%; ‘occasional’:1-10%; ‘frequent’:10-50%; ‘abundant’:50-90%; ‘continuous’:>90%.  Persistence for sporadic discharges: ‘rare’:<1/hr; ‘occasional’:1/min-1/hr; ‘frequent’:>1/min; ‘abundant’:>1/10 sec.  RPP=rhythmic and periodic patterns; GRDA=generalized rhythmic delta activity; FIRDA=frontal intermittent GRDA; LRDA=lateralized rhythmic delta activity; TIRDA=temporal intermittent rhythmic delta activity;  LPD=PLED=lateralized periodic discharges; GPD=generalized periodic discharges; BIPDs =bilateral independent periodic discharges; Mf=multifocal; SIRPDs=stimulus induced rhythmic, periodic, or ictal appearing discharges; BIRDs=brief potentially ictal rhythmic discharges >4 Hz, lasting .5-10s; PFA (paroxysmal bursts >13 Hz or =8 Hz <10s).  Modifiers: +F=with fast component; +S=with spike component; +R=with rhythmic component.  S-B=burst suppression pattern.  Max=maximal. N1-drowsy; N2-stage II sleep; N3-slow wave sleep. SSS/BETS=small sharp spikes/benign epileptiform transients of sleep. HV=hyperventilation; PS=photic stimulation]]]    Daily EEG Visual Analysis    FINDINGS:      Background:  The background is continuous and symmetric. The predominant background in the most wakeful state consists of diffuse polymorphic delta slowing, intermittent theta. There is no posterior dominant rhythm.     Background Slowing:  Generalized slowing: As above  Focal slowing: None    State Changes:   Absent    Interictal Findings:  Continuous generalized sharp waves, often with triphasic morphology, periodic (GPDs) at 1-1.5 Hz. Some periods with temporary decrease in prevalence or disappearance of GPDs. GPDs are low- to medium-amplitude and often bluntly contoured.    Electrographic and Electroclinical seizures:  Intermittent brief myoclonic jerks consisting of leftward head turn, at times with truncal and/or neck flexion (often low-amplitude), less often with b/l shoulder shrug, time-locked to one of the ongoing GPDs. Movements are not periodic/rhythmic.    Other Clinical Events:  None    Activation Procedures:   Hyperventilation is not performed.    Photic stimulation is performed and does not elicit any abnormalities. GPDs are ongoing as above.    Artifacts:  Intermittent myogenic and movement artifacts are present.    EKG:  Single-lead EKG shows irregularly irregular rhythm and tachycardia. Occasional PVCs.    EEG Classification / Summary:  Abnormal video-EEG in a lethargic patient.  -Intermittent generalized myoclonic seizures consisting of brief myoclonic jerks time-locked to a GPD: leftward head turn, at times with truncal and/or neck flexion, or b/l shoulder shrug  -Continuous GPDs at 1-1.5 Hz, often with triphasic morphology  -Moderate-severe diffuse slowing    Clinical Impression:  -Intermittent generalized myoclonic seizures consisting of brief myoclonic jerks: leftward head turn, at times with truncal and/or neck flexion, or b/l shoulder shrug  -Generalized ictal-interictal continuum. A pattern on the ictal-interictal continuum is a pattern that does not qualify as an electrographic seizure or electrographic status epilepticus, but there is a reasonable chance that it may be contributing to impaired alertness, causing other clinical symptoms, and/or contributing to neuronal injury.   -Moderate-severe diffuse cerebral dysfunction is nonspecific in etiology.   -Single-lead EKG shows irregularly irregular rhythm and tachycardia.    Neurology was informed of seizures.      -------------------------------------------------------------------------------------------------------    Karyn Escobar MD  Attending Physician, [facility]    -------------------------------------------------------------------------------------------------------    To reach EEG technologist:  Please use the pager number for the appropriate hospital or contact the .  At [facility] - Pager #: [phone]    To reach EEG-reading physician:  [facility] EEG Reading Room Phone #: [phone]  Epilepsy Answering Service after 5PM and before 8:30AM: Phone #: [phone]      SHAIK DONOHUE [facility]-[id number]     Study Date: 2/1/24 12:02 - 2/2/24 11:31  Duration: 23 hr 1 min  --------------------------------------------------------------------------------------------------  History:  CC/ HPI Patient is a 89y old  Male who presents with a chief complaint of COVID19, Chest pain (02 Feb 2024 07:32)    MEDICATIONS  (STANDING):  albuterol/ipratropium for Nebulization 3 milliLiter(s) Nebulizer every 6 hours  artificial  tears Solution 1 Drop(s) Left EYE four times a day  aspirin Suppository 300 milliGRAM(s) Rectal daily  chlorhexidine 2% Cloths 1 Application(s) Topical daily  dextrose 5%. 1000 milliLiter(s) (100 mL/Hr) IV Continuous <Continuous>  dextrose 50% Injectable 25 Gram(s) IV Push once  dextrose 50% Injectable 25 Gram(s) IV Push once  dextrose 50% Injectable 12.5 Gram(s) IV Push once  dextrose Oral Gel 15 Gram(s) Oral once  escitalopram 10 milliGRAM(s) Oral daily  glucagon  Injectable 1 milliGRAM(s) IntraMuscular once  insulin lispro (ADMELOG) corrective regimen sliding scale   SubCutaneous every 6 hours  levETIRAcetam  IVPB 500 milliGRAM(s) IV Intermittent every 12 hours  lidocaine   4% Patch 1 Patch Transdermal every 24 hours  pantoprazole  Injectable 40 milliGRAM(s) IV Push every 12 hours  petrolatum Ophthalmic Ointment 1 Application(s) Left EYE at bedtime  sodium chloride 3%  Inhalation 4 milliLiter(s) Inhalation every 6 hours  sucralfate suspension 1 Gram(s) Enteral Tube two times a day  valproate sodium  IVPB 500 milliGRAM(s) IV Intermittent every 8 hours    --------------------------------------------------------------------------------------------------  Study Interpretation:    [[[Abbreviation Key:  PDR=alpha rhythm/posterior dominant rhythm. A-P=anterior posterior.  Amplitude: ‘very low’:<20; ‘low’:20-49; ‘medium’:; ‘high’:>150uV.  Persistence for periodic/rhythmic patterns (% of epoch) ‘rare’:<1%; ‘occasional’:1-10%; ‘frequent’:10-50%; ‘abundant’:50-90%; ‘continuous’:>90%.  Persistence for sporadic discharges: ‘rare’:<1/hr; ‘occasional’:1/min-1/hr; ‘frequent’:>1/min; ‘abundant’:>1/10 sec.  RPP=rhythmic and periodic patterns; GRDA=generalized rhythmic delta activity; FIRDA=frontal intermittent GRDA; LRDA=lateralized rhythmic delta activity; TIRDA=temporal intermittent rhythmic delta activity;  LPD=PLED=lateralized periodic discharges; GPD=generalized periodic discharges; BIPDs =bilateral independent periodic discharges; Mf=multifocal; SIRPDs=stimulus induced rhythmic, periodic, or ictal appearing discharges; BIRDs=brief potentially ictal rhythmic discharges >4 Hz, lasting .5-10s; PFA (paroxysmal bursts >13 Hz or =8 Hz <10s).  Modifiers: +F=with fast component; +S=with spike component; +R=with rhythmic component.  S-B=burst suppression pattern.  Max=maximal. N1-drowsy; N2-stage II sleep; N3-slow wave sleep. SSS/BETS=small sharp spikes/benign epileptiform transients of sleep. HV=hyperventilation; PS=photic stimulation]]]    Daily EEG Visual Analysis    FINDINGS:      Background:  The background is continuous and symmetric. The predominant background in the most wakeful state consists of diffuse polymorphic delta slowing, intermittent theta. There is no posterior dominant rhythm.     Background Slowing:  Generalized slowing: As above  Focal slowing: None    State Changes:   Absent    Interictal Findings:  Continuous generalized sharp waves, often with triphasic morphology, periodic (GPDs) at 1-1.5 Hz. Some periods with temporary decrease in prevalence or disappearance of GPDs. GPDs are low- to medium-amplitude and often bluntly contoured.    Electrographic and Electroclinical seizures:  Intermittent brief myoclonic jerks consisting of leftward head turn, at times with truncal and/or neck flexion (often low-amplitude), less often with b/l shoulder shrug, time-locked to one of the ongoing GPDs. Movements are not periodic/rhythmic.    Other Clinical Events:  Occasional movements of slight rightward head turn, right shoulder shrug, slight truncal flexion, and RLE extension (limited view of left side of the patient), not definitely associated with a GPD.    Activation Procedures:   Hyperventilation is not performed.    Photic stimulation is performed and does not elicit any abnormalities. GPDs are ongoing as above.    Artifacts:  Intermittent myogenic and movement artifacts are present.    EKG:  Single-lead EKG shows irregularly irregular rhythm and tachycardia. At times regular rhythm. Occasional PVCs.    EEG Classification / Summary:  Abnormal video-EEG in a lethargic patient.  -Intermittent generalized myoclonic seizures consisting of brief myoclonic jerks time-locked to a GPD: leftward head turn, at times with truncal and/or neck flexion, or b/l shoulder shrug  -Continuous GPDs at 1-1.5 Hz, often with triphasic morphology  -Occasional additional movements as above with no definite EEG correlate  -Moderate-severe diffuse slowing    Clinical Impression:  -Intermittent generalized myoclonic seizures consisting of brief myoclonic jerks: leftward head turn, at times with truncal and/or neck flexion, or b/l shoulder shrug  -Generalized ictal-interictal continuum. A pattern on the ictal-interictal continuum is a pattern that does not qualify as an electrographic seizure or electrographic status epilepticus, but there is a reasonable chance that it may be contributing to impaired alertness, causing other clinical symptoms, and/or contributing to neuronal injury.   -Occasional additional movements as above with no definite EEG correlate  -Moderate-severe diffuse cerebral dysfunction is nonspecific in etiology.   -Single-lead EKG shows irregularly irregular rhythm and tachycardia.    Neurology was informed of seizures.      -------------------------------------------------------------------------------------------------------    Karyn Escobar MD  Attending Physician, [facility] [place]    -------------------------------------------------------------------------------------------------------    To reach EEG technologist:  Please use the pager number for the appropriate hospital or contact the .  At [facility] - Pager #: [phone]    To reach EEG-reading physician:  [facility] EEG Reading Room Phone #: [phone]  Epilepsy Answering Service after 5PM and before 8:30AM: Phone #: [phone]

## 2024-02-02 NOTE — PROGRESS NOTE ADULT - ASSESSMENT
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     PLAN  =====Neurologic=====  #CVA  - prior CVA x3 with no residual deficits  #myoclonic jerks  - on Keppra, increased per neuro  - holding home  gabapentin and memantine in setting of somnolence  - continue lexapro if able to take po   - wean precedex as tolerated   - f/u 24 hour EEG  - CT/CTA negative for stroke       =====Pulmonary=====  #COVID  - s/p paxlovid and 1 dose remdesivir while inpatient  #Pleural effusions b/l  - CT chest from 1/16 showing new small bilateral pleural effusions/ atelectasis/ patchy bilateral ground-glass opacities are consistent with pulmonary edema.  - s/p zosyn for aspiration PNA from 1/20- 1/28   - POCUS with resolution of b/l pleural effusions, still holding brilinta for possible reaccumulation requiring pleural tap     - trend blood gas/CO2 as pt on bipap and worsens may need repeat intubation     =====Cardiovascular=====  Patient does not currently require vasopressors and is normotensive  #HTN  - on home amlodipine and metoprolol currently holding     #CAD  - on Brilinta (holding) aspirin and ranolazine continue   - as per vascular okay to hold Brilinta for possible pleural effusion thoracentesis  -have not heard back from cardiology regarding Brilinta / last stent 2022 , will hold   - on asa suppository     #Afib  - pt with known history of pAF, first observed 2/1  - will consider starting AC and/or rate control if persists     =====GI=====  #upper GI bleed  - s/p EGD showing dieulafoy lesion and esophagitis likely 2/2 NGT irritation s/p 2 clips  - on protonix IV BID continue   - CT on 1/25 with cholelithiasis and sludge HIDA on 1/26 confirming likely acute choley, s/p IR perc cholecystostomy 1/26   - plan for PEG tube per familys GOC    #Diet  - tube feeds NGT     =====Renal/=====  #Electrolytes  - Maintain K>4, Phos>3, Mag>2, iCal>1  - baseline cr 1.5, at baseline  - on D5 for hyperNa and unable to get free water. Trend BMP/Na     =====Endocrine=====  #DM2  - on NPH 16 units q6 and SSI given solumedrol   - a1c 9.3, glucose wnl inpatient   - hypoglycemic overnight 1/26  started on d10 drips and insulin DC'd  - CTM w/ FSGs, will consider restarting insulin at lower dose  -NPH increasaed to 6u 1/28 due to hyperglycemia      =====Infectious Disease=====  #COVID   - s/p paxlovid and 1 dose remdesivir  # PNA  - CT chest showing ground glass opacities  - s/p zosyn  - intermittent episodes of fevers, likely source GI given choleycystitis? culture NGTD  - ID consult 1/24-  CT maxillofacial CT head wnl  - CT chest with evolving GGO since 1/16  - meropenem 5d course ending 2/1 at night     =====Heme/Onc=====  #DVT Ppx  - heparin sub-q    =====Ethics=====  FULL CODE.

## 2024-02-02 NOTE — PROCEDURE NOTE - NSTRACHPOSTINTU_RESP_A_CORE
Breath sounds bilateral/Breath sounds equal/Positive end tidal Co2 noted
Appropriate capnography/Breath sounds bilateral/Breath sounds equal/Chest excursion noted/Chest X-Ray/Positive end tidal Co2 noted

## 2024-02-03 LAB
ALBUMIN SERPL ELPH-MCNC: 2.3 G/DL — LOW (ref 3.3–5)
ALBUMIN SERPL ELPH-MCNC: 2.3 G/DL — LOW (ref 3.3–5)
ALP SERPL-CCNC: 71 U/L — SIGNIFICANT CHANGE UP (ref 40–120)
ALP SERPL-CCNC: 97 U/L — SIGNIFICANT CHANGE UP (ref 40–120)
ALT FLD-CCNC: 46 U/L — HIGH (ref 4–41)
ALT FLD-CCNC: 52 U/L — HIGH (ref 4–41)
ANION GAP SERPL CALC-SCNC: 10 MMOL/L — SIGNIFICANT CHANGE UP (ref 7–14)
ANION GAP SERPL CALC-SCNC: 11 MMOL/L — SIGNIFICANT CHANGE UP (ref 7–14)
APTT BLD: 29 SEC — SIGNIFICANT CHANGE UP (ref 24.5–35.6)
AST SERPL-CCNC: 42 U/L — HIGH (ref 4–40)
AST SERPL-CCNC: 46 U/L — HIGH (ref 4–40)
BILIRUB SERPL-MCNC: 0.3 MG/DL — SIGNIFICANT CHANGE UP (ref 0.2–1.2)
BILIRUB SERPL-MCNC: 0.3 MG/DL — SIGNIFICANT CHANGE UP (ref 0.2–1.2)
BLOOD GAS ARTERIAL COMPREHENSIVE RESULT: SIGNIFICANT CHANGE UP
BLOOD GAS ARTERIAL COMPREHENSIVE RESULT: SIGNIFICANT CHANGE UP
BUN SERPL-MCNC: 35 MG/DL — HIGH (ref 7–23)
BUN SERPL-MCNC: 35 MG/DL — HIGH (ref 7–23)
CALCIUM SERPL-MCNC: 8.1 MG/DL — LOW (ref 8.4–10.5)
CALCIUM SERPL-MCNC: 8.2 MG/DL — LOW (ref 8.4–10.5)
CHLORIDE SERPL-SCNC: 107 MMOL/L — SIGNIFICANT CHANGE UP (ref 98–107)
CHLORIDE SERPL-SCNC: 110 MMOL/L — HIGH (ref 98–107)
CO2 SERPL-SCNC: 30 MMOL/L — SIGNIFICANT CHANGE UP (ref 22–31)
CO2 SERPL-SCNC: 30 MMOL/L — SIGNIFICANT CHANGE UP (ref 22–31)
CREAT SERPL-MCNC: 1.84 MG/DL — HIGH (ref 0.5–1.3)
CREAT SERPL-MCNC: 1.89 MG/DL — HIGH (ref 0.5–1.3)
EGFR: 33 ML/MIN/1.73M2 — LOW
EGFR: 34 ML/MIN/1.73M2 — LOW
GAS PNL BLDA: SIGNIFICANT CHANGE UP
GLUCOSE BLDC GLUCOMTR-MCNC: 113 MG/DL — HIGH (ref 70–99)
GLUCOSE BLDC GLUCOMTR-MCNC: 191 MG/DL — HIGH (ref 70–99)
GLUCOSE BLDC GLUCOMTR-MCNC: 270 MG/DL — HIGH (ref 70–99)
GLUCOSE BLDC GLUCOMTR-MCNC: 283 MG/DL — HIGH (ref 70–99)
GLUCOSE SERPL-MCNC: 223 MG/DL — HIGH (ref 70–99)
GLUCOSE SERPL-MCNC: 94 MG/DL — SIGNIFICANT CHANGE UP (ref 70–99)
HCT VFR BLD CALC: 22.2 % — LOW (ref 39–50)
HCT VFR BLD CALC: 22.5 % — LOW (ref 39–50)
HGB BLD-MCNC: 7.1 G/DL — LOW (ref 13–17)
HGB BLD-MCNC: 7.1 G/DL — LOW (ref 13–17)
INR BLD: 1.23 RATIO — HIGH (ref 0.85–1.18)
MAGNESIUM SERPL-MCNC: 2.1 MG/DL — SIGNIFICANT CHANGE UP (ref 1.6–2.6)
MAGNESIUM SERPL-MCNC: 2.3 MG/DL — SIGNIFICANT CHANGE UP (ref 1.6–2.6)
MCHC RBC-ENTMCNC: 29.7 PG — SIGNIFICANT CHANGE UP (ref 27–34)
MCHC RBC-ENTMCNC: 29.7 PG — SIGNIFICANT CHANGE UP (ref 27–34)
MCHC RBC-ENTMCNC: 31.6 GM/DL — LOW (ref 32–36)
MCHC RBC-ENTMCNC: 32 GM/DL — SIGNIFICANT CHANGE UP (ref 32–36)
MCV RBC AUTO: 92.9 FL — SIGNIFICANT CHANGE UP (ref 80–100)
MCV RBC AUTO: 94.1 FL — SIGNIFICANT CHANGE UP (ref 80–100)
NRBC # BLD: 0 /100 WBCS — SIGNIFICANT CHANGE UP (ref 0–0)
NRBC # BLD: 0 /100 WBCS — SIGNIFICANT CHANGE UP (ref 0–0)
NRBC # FLD: 0.02 K/UL — HIGH (ref 0–0)
NRBC # FLD: 0.03 K/UL — HIGH (ref 0–0)
PHOSPHATE SERPL-MCNC: 2.2 MG/DL — LOW (ref 2.5–4.5)
PHOSPHATE SERPL-MCNC: 2.7 MG/DL — SIGNIFICANT CHANGE UP (ref 2.5–4.5)
PLATELET # BLD AUTO: 359 K/UL — SIGNIFICANT CHANGE UP (ref 150–400)
PLATELET # BLD AUTO: 377 K/UL — SIGNIFICANT CHANGE UP (ref 150–400)
POTASSIUM SERPL-MCNC: 3.2 MMOL/L — LOW (ref 3.5–5.3)
POTASSIUM SERPL-MCNC: 3.7 MMOL/L — SIGNIFICANT CHANGE UP (ref 3.5–5.3)
POTASSIUM SERPL-SCNC: 3.2 MMOL/L — LOW (ref 3.5–5.3)
POTASSIUM SERPL-SCNC: 3.7 MMOL/L — SIGNIFICANT CHANGE UP (ref 3.5–5.3)
PROT SERPL-MCNC: 5.8 G/DL — LOW (ref 6–8.3)
PROT SERPL-MCNC: 6.1 G/DL — SIGNIFICANT CHANGE UP (ref 6–8.3)
PROTHROM AB SERPL-ACNC: 13.7 SEC — HIGH (ref 9.5–13)
RBC # BLD: 2.39 M/UL — LOW (ref 4.2–5.8)
RBC # BLD: 2.39 M/UL — LOW (ref 4.2–5.8)
RBC # FLD: 17.1 % — HIGH (ref 10.3–14.5)
RBC # FLD: 17.2 % — HIGH (ref 10.3–14.5)
SODIUM SERPL-SCNC: 147 MMOL/L — HIGH (ref 135–145)
SODIUM SERPL-SCNC: 151 MMOL/L — HIGH (ref 135–145)
WBC # BLD: 8.13 K/UL — SIGNIFICANT CHANGE UP (ref 3.8–10.5)
WBC # BLD: 8.99 K/UL — SIGNIFICANT CHANGE UP (ref 3.8–10.5)
WBC # FLD AUTO: 8.13 K/UL — SIGNIFICANT CHANGE UP (ref 3.8–10.5)
WBC # FLD AUTO: 8.99 K/UL — SIGNIFICANT CHANGE UP (ref 3.8–10.5)

## 2024-02-03 PROCEDURE — 95720 EEG PHY/QHP EA INCR W/VEEG: CPT

## 2024-02-03 PROCEDURE — 99291 CRITICAL CARE FIRST HOUR: CPT

## 2024-02-03 RX ORDER — POTASSIUM CHLORIDE 20 MEQ
40 PACKET (EA) ORAL EVERY 4 HOURS
Refills: 0 | Status: DISCONTINUED | OUTPATIENT
Start: 2024-02-03 | End: 2024-02-03

## 2024-02-03 RX ORDER — VALPROIC ACID (AS SODIUM SALT) 250 MG/5ML
600 SOLUTION, ORAL ORAL EVERY 6 HOURS
Refills: 0 | Status: DISCONTINUED | OUTPATIENT
Start: 2024-02-03 | End: 2024-02-03

## 2024-02-03 RX ORDER — POTASSIUM CHLORIDE 20 MEQ
40 PACKET (EA) ORAL EVERY 4 HOURS
Refills: 0 | Status: COMPLETED | OUTPATIENT
Start: 2024-02-03 | End: 2024-02-03

## 2024-02-03 RX ORDER — BUMETANIDE 0.25 MG/ML
2 INJECTION INTRAMUSCULAR; INTRAVENOUS EVERY 12 HOURS
Refills: 0 | Status: DISCONTINUED | OUTPATIENT
Start: 2024-02-03 | End: 2024-02-04

## 2024-02-03 RX ORDER — PROPOFOL 10 MG/ML
30.05 INJECTION, EMULSION INTRAVENOUS
Qty: 1000 | Refills: 0 | Status: DISCONTINUED | OUTPATIENT
Start: 2024-02-03 | End: 2024-02-04

## 2024-02-03 RX ORDER — BUMETANIDE 0.25 MG/ML
4 INJECTION INTRAMUSCULAR; INTRAVENOUS EVERY 12 HOURS
Refills: 0 | Status: DISCONTINUED | OUTPATIENT
Start: 2024-02-03 | End: 2024-02-03

## 2024-02-03 RX ORDER — BUMETANIDE 0.25 MG/ML
4 INJECTION INTRAMUSCULAR; INTRAVENOUS ONCE
Refills: 0 | Status: COMPLETED | OUTPATIENT
Start: 2024-02-03 | End: 2024-02-03

## 2024-02-03 RX ORDER — DEXMEDETOMIDINE HYDROCHLORIDE IN 0.9% SODIUM CHLORIDE 4 UG/ML
0.2 INJECTION INTRAVENOUS
Qty: 200 | Refills: 0 | Status: DISCONTINUED | OUTPATIENT
Start: 2024-02-03 | End: 2024-02-03

## 2024-02-03 RX ORDER — DEXMEDETOMIDINE HYDROCHLORIDE IN 0.9% SODIUM CHLORIDE 4 UG/ML
0.2 INJECTION INTRAVENOUS
Qty: 400 | Refills: 0 | Status: DISCONTINUED | OUTPATIENT
Start: 2024-02-03 | End: 2024-02-09

## 2024-02-03 RX ORDER — VALPROIC ACID (AS SODIUM SALT) 250 MG/5ML
600 SOLUTION, ORAL ORAL EVERY 6 HOURS
Refills: 0 | Status: DISCONTINUED | OUTPATIENT
Start: 2024-02-03 | End: 2024-02-04

## 2024-02-03 RX ADMIN — Medication 3.72 MICROGRAM(S)/KG/MIN: at 19:58

## 2024-02-03 RX ADMIN — Medication 1 DROP(S): at 11:14

## 2024-02-03 RX ADMIN — INSULIN GLARGINE 15 UNIT(S): 100 INJECTION, SOLUTION SUBCUTANEOUS at 12:27

## 2024-02-03 RX ADMIN — PANTOPRAZOLE SODIUM 40 MILLIGRAM(S): 20 TABLET, DELAYED RELEASE ORAL at 17:32

## 2024-02-03 RX ADMIN — PROPOFOL 14.3 MICROGRAM(S)/KG/MIN: 10 INJECTION, EMULSION INTRAVENOUS at 08:30

## 2024-02-03 RX ADMIN — Medication 1 DROP(S): at 00:26

## 2024-02-03 RX ADMIN — LIDOCAINE 1 PATCH: 4 CREAM TOPICAL at 07:32

## 2024-02-03 RX ADMIN — Medication 2: at 00:26

## 2024-02-03 RX ADMIN — Medication 300 MILLIGRAM(S): at 11:14

## 2024-02-03 RX ADMIN — LIDOCAINE 1 PATCH: 4 CREAM TOPICAL at 08:35

## 2024-02-03 RX ADMIN — SODIUM CHLORIDE 4 MILLILITER(S): 9 INJECTION INTRAMUSCULAR; INTRAVENOUS; SUBCUTANEOUS at 03:57

## 2024-02-03 RX ADMIN — Medication 6: at 12:26

## 2024-02-03 RX ADMIN — PROPOFOL 14.3 MICROGRAM(S)/KG/MIN: 10 INJECTION, EMULSION INTRAVENOUS at 12:27

## 2024-02-03 RX ADMIN — LIDOCAINE 1 PATCH: 4 CREAM TOPICAL at 19:59

## 2024-02-03 RX ADMIN — ESCITALOPRAM OXALATE 10 MILLIGRAM(S): 10 TABLET, FILM COATED ORAL at 11:14

## 2024-02-03 RX ADMIN — DEXMEDETOMIDINE HYDROCHLORIDE IN 0.9% SODIUM CHLORIDE 3.97 MICROGRAM(S)/KG/HR: 4 INJECTION INTRAVENOUS at 15:30

## 2024-02-03 RX ADMIN — Medication 3 MILLILITER(S): at 10:47

## 2024-02-03 RX ADMIN — Medication 1 DROP(S): at 23:38

## 2024-02-03 RX ADMIN — DEXMEDETOMIDINE HYDROCHLORIDE IN 0.9% SODIUM CHLORIDE 3.97 MICROGRAM(S)/KG/HR: 4 INJECTION INTRAVENOUS at 19:58

## 2024-02-03 RX ADMIN — Medication 3.72 MICROGRAM(S)/KG/MIN: at 17:31

## 2024-02-03 RX ADMIN — CHLORHEXIDINE GLUCONATE 1 APPLICATION(S): 213 SOLUTION TOPICAL at 11:15

## 2024-02-03 RX ADMIN — CHLORHEXIDINE GLUCONATE 15 MILLILITER(S): 213 SOLUTION TOPICAL at 06:04

## 2024-02-03 RX ADMIN — HEPARIN SODIUM 5000 UNIT(S): 5000 INJECTION INTRAVENOUS; SUBCUTANEOUS at 14:40

## 2024-02-03 RX ADMIN — Medication 55 MILLIGRAM(S): at 08:59

## 2024-02-03 RX ADMIN — BUMETANIDE 2 MILLIGRAM(S): 0.25 INJECTION INTRAMUSCULAR; INTRAVENOUS at 17:33

## 2024-02-03 RX ADMIN — Medication 40 MILLIEQUIVALENT(S): at 06:48

## 2024-02-03 RX ADMIN — Medication 56 MILLIGRAM(S): at 18:53

## 2024-02-03 RX ADMIN — Medication 3 MILLILITER(S): at 21:25

## 2024-02-03 RX ADMIN — Medication 2: at 23:38

## 2024-02-03 RX ADMIN — Medication 57.5 MILLIGRAM(S): at 02:48

## 2024-02-03 RX ADMIN — Medication 1 GRAM(S): at 06:04

## 2024-02-03 RX ADMIN — LEVETIRACETAM 400 MILLIGRAM(S): 250 TABLET, FILM COATED ORAL at 17:32

## 2024-02-03 RX ADMIN — PANTOPRAZOLE SODIUM 40 MILLIGRAM(S): 20 TABLET, DELAYED RELEASE ORAL at 06:04

## 2024-02-03 RX ADMIN — PROPOFOL 14.3 MICROGRAM(S)/KG/MIN: 10 INJECTION, EMULSION INTRAVENOUS at 19:58

## 2024-02-03 RX ADMIN — Medication 6: at 17:34

## 2024-02-03 RX ADMIN — SODIUM CHLORIDE 4 MILLILITER(S): 9 INJECTION INTRAMUSCULAR; INTRAVENOUS; SUBCUTANEOUS at 10:47

## 2024-02-03 RX ADMIN — Medication 3 MILLILITER(S): at 16:26

## 2024-02-03 RX ADMIN — Medication 1 APPLICATION(S): at 22:45

## 2024-02-03 RX ADMIN — SODIUM CHLORIDE 4 MILLILITER(S): 9 INJECTION INTRAMUSCULAR; INTRAVENOUS; SUBCUTANEOUS at 16:26

## 2024-02-03 RX ADMIN — SODIUM CHLORIDE 4 MILLILITER(S): 9 INJECTION INTRAMUSCULAR; INTRAVENOUS; SUBCUTANEOUS at 21:32

## 2024-02-03 RX ADMIN — HEPARIN SODIUM 5000 UNIT(S): 5000 INJECTION INTRAVENOUS; SUBCUTANEOUS at 22:44

## 2024-02-03 RX ADMIN — Medication 1 DROP(S): at 17:32

## 2024-02-03 RX ADMIN — BUMETANIDE 132 MILLIGRAM(S): 0.25 INJECTION INTRAMUSCULAR; INTRAVENOUS at 06:02

## 2024-02-03 RX ADMIN — Medication 63.75 MILLIMOLE(S): at 09:51

## 2024-02-03 RX ADMIN — CHLORHEXIDINE GLUCONATE 15 MILLILITER(S): 213 SOLUTION TOPICAL at 17:32

## 2024-02-03 RX ADMIN — Medication 1 DROP(S): at 06:05

## 2024-02-03 RX ADMIN — DEXMEDETOMIDINE HYDROCHLORIDE IN 0.9% SODIUM CHLORIDE 3.97 MICROGRAM(S)/KG/HR: 4 INJECTION INTRAVENOUS at 17:31

## 2024-02-03 RX ADMIN — HEPARIN SODIUM 5000 UNIT(S): 5000 INJECTION INTRAVENOUS; SUBCUTANEOUS at 06:04

## 2024-02-03 RX ADMIN — Medication 3 MILLILITER(S): at 03:57

## 2024-02-03 RX ADMIN — Medication 1 GRAM(S): at 17:32

## 2024-02-03 RX ADMIN — Medication 3.72 MICROGRAM(S)/KG/MIN: at 08:30

## 2024-02-03 RX ADMIN — LEVETIRACETAM 400 MILLIGRAM(S): 250 TABLET, FILM COATED ORAL at 06:04

## 2024-02-03 RX ADMIN — Medication 40 MILLIEQUIVALENT(S): at 09:51

## 2024-02-03 NOTE — PROGRESS NOTE ADULT - ASSESSMENT
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 During hospitalization, pt was taken off Brilinta for a planned pleural tap of lung effusions/GI had plans for PEG tube placement. Pt was intubated and since extubation, it has been noted that pt has been completely "aphasic"- nonverbal and not following commands for which neurology has been consulted. LKW 7:30 PM 1/30/24. Also, it has been noted that there is a R facial droop and pt has been having less movements on the LUE/LLE and pt has been looking towards the right side more than his left side, and has been having "myoclonic jerks" in his shoulders with some extended and inward "posturing in his arms". Prior to extubation, he was following commands. At baseline, prior to hospital admission pt was using a cane but independent of ADLs. Sedation stopped at 7:30 PM 1/31/24. EEG was started on 2/1/24 which showed intermittent generalized myoclonic seizures consisting of brief myoclonic jerks in addition to generalized ictal-interictal continuum. 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    IMPRESSION: Acute change in mental status w/ reported aphasia and decreased movements on Left side and transient R facial droop after extubation possibly 2/2 seizure-like activity vs. vascular etiology in the setting of known paroxsymal Afib. 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    RECOMMENDATIONS:   [] Continue w/ vEEG  [x] s/p Valproic acid 1500mg x1 (2/1/24)  [x] Load Valproic acid 1600mg x1 (completed at 2/2/24 at 12pm)  [] change maintenance valproic acid to 600 mg q6hr  -will consider tapering based on 2/4 depending on level and EEG findings  [x] VPA level 14.6 (Albumin 2.6) on 2/2/24  [] Please check another VPA level on 2/4/24 morning 1 hour prior to dose - f/u VPA level   [] taper off of propofol and hold off on using midazolam  [] if needed for clinical sedation, please use precedex  [x] Continue home ASA 300mg suppository QD  [] Start Atorvastatin per ASCVD risk; if stroke on MRI, then high-intensity dosage  [x] Continue levetiracetam 500mg IV Q12H  [] MRI brain w/ and w/o contrast only when medically stable  [x] TTE study - EF 62% no regional wall abnl, mild mod severe AS mild AR  [x] Check HbA1c (9.3) and lipid panel (LDL 20)  [x] Telemonitoring  [] Check EKG for NJ interval  [] Rest of care per MICU team    Seen with neurology attending. Case discussed with epilepsy team and recommendations conveyed to primary team.

## 2024-02-03 NOTE — PROGRESS NOTE ADULT - ASSESSMENT
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     EKG SR RBBB (old per family)     1) Hypoxia   - s/p bronch   - transferred to MICU intubated now s/p extubation on bipap  - Rt pleural effusion ,  - hold lasix given Hypernatremia and worsening creat  - intubated again     2) CAD s/p CABG  -p/w chest pain. EKG non ischemic , trop -sera   - echo with normal lv and moderate AS   - c/w metoprolol   - chest pains  Trop mildly elevated and trending down likely sec to kidney disease,   -holding brilinta, was on it for SMA stent placed in 12/2023, held 2/2 anticipation for PEG placement, restart when no further invasive procedures planned    3) Covid +  - s/p remdesivir   - c/w supportive management   - t/t per primary team     4) Abd distension   -CTA AP obtained which showed  partially occlusive calcified and non-calcified plaque just distal to take-off of the SMA, with estimated at least 75% luminal narrowing. Limited evaluation of its distal branches. 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   -on Protonix IV q 12

## 2024-02-03 NOTE — PROGRESS NOTE ADULT - SUBJECTIVE AND OBJECTIVE BOX
Patient is a 89y old  Male who presents with a chief complaint of COVID19, Chest pain (02 Feb 2024 16:25)      INTERVAL HPI/OVERNIGHT EVENTS:   No overnight events   Afebrile, hemodynamically stable     ICU Vital Signs Last 24 Hrs  T(C): 36.8 (03 Feb 2024 04:00), Max: 36.8 (03 Feb 2024 04:00)  T(F): 98.2 (03 Feb 2024 04:00), Max: 98.2 (03 Feb 2024 04:00)  HR: 95 (03 Feb 2024 07:44) (76 - 101)  BP: 108/63 (03 Feb 2024 06:00) (108/63 - 160/68)  BP(mean): 74 (03 Feb 2024 06:00) (64 - 106)  ABP: --  ABP(mean): --  RR: 20 (03 Feb 2024 06:00) (12 - 25)  SpO2: 100% (03 Feb 2024 07:44) (98% - 100%)    O2 Parameters below as of 03 Feb 2024 06:00  Patient On (Oxygen Delivery Method): AC20//P+5    O2 Concentration (%): 40      I&O's Summary    02 Feb 2024 07:01  -  03 Feb 2024 07:00  --------------------------------------------------------  IN: 2008.3 mL / OUT: 1703 mL / NET: 305.3 mL          LABS:                        7.1    8.99  )-----------( 377      ( 03 Feb 2024 04:24 )             22.5     02-03    151<H>  |  110<H>  |  35<H>  ----------------------------<  94  3.2<L>   |  30  |  1.89<H>    Ca    8.2<L>      03 Feb 2024 04:24  Phos  2.2     02-03  Mg     2.30     02-03    TPro  6.1  /  Alb  2.3<L>  /  TBili  0.3  /  DBili  x   /  AST  46<H>  /  ALT  52<H>  /  AlkPhos  71  02-03    PT/INR - ( 03 Feb 2024 04:24 )   PT: 13.7 sec;   INR: 1.23 ratio         PTT - ( 03 Feb 2024 04:24 )  PTT:29.0 sec  Urinalysis Basic - ( 03 Feb 2024 04:24 )    Color: x / Appearance: x / SG: x / pH: x  Gluc: 94 mg/dL / Ketone: x  / Bili: x / Urobili: x   Blood: x / Protein: x / Nitrite: x   Leuk Esterase: x / RBC: x / WBC x   Sq Epi: x / Non Sq Epi: x / Bacteria: x      CAPILLARY BLOOD GLUCOSE      POCT Blood Glucose.: 113 mg/dL (03 Feb 2024 06:00)  POCT Blood Glucose.: 158 mg/dL (02 Feb 2024 23:55)  POCT Blood Glucose.: 233 mg/dL (02 Feb 2024 18:12)  POCT Blood Glucose.: 306 mg/dL (02 Feb 2024 11:53)    ABG - ( 03 Feb 2024 04:24 )  pH, Arterial: 7.51  pH, Blood: x     /  pCO2: 43    /  pO2: 43    / HCO3: 34    / Base Excess: 10.3  /  SaO2: 75.2                RADIOLOGY & ADDITIONAL TESTS:    Consultant(s) Notes Reviewed:  [x ] YES  [ ] NO    MEDICATIONS  (STANDING):  albuterol/ipratropium for Nebulization 3 milliLiter(s) Nebulizer every 6 hours  artificial  tears Solution 1 Drop(s) Left EYE four times a day  aspirin Suppository 300 milliGRAM(s) Rectal daily  chlorhexidine 0.12% Liquid 15 milliLiter(s) Oral Mucosa every 12 hours  chlorhexidine 2% Cloths 1 Application(s) Topical daily  dextrose 5%. 1000 milliLiter(s) (50 mL/Hr) IV Continuous <Continuous>  dextrose 5%. 1000 milliLiter(s) (100 mL/Hr) IV Continuous <Continuous>  dextrose 50% Injectable 25 Gram(s) IV Push once  dextrose 50% Injectable 12.5 Gram(s) IV Push once  dextrose 50% Injectable 25 Gram(s) IV Push once  escitalopram 10 milliGRAM(s) Oral daily  glucagon  Injectable 1 milliGRAM(s) IntraMuscular once  heparin   Injectable 5000 Unit(s) SubCutaneous every 8 hours  insulin glargine Injectable (LANTUS) 15 Unit(s) SubCutaneous <User Schedule>  insulin lispro (ADMELOG) corrective regimen sliding scale   SubCutaneous every 6 hours  levETIRAcetam  IVPB 500 milliGRAM(s) IV Intermittent every 12 hours  lidocaine   4% Patch 1 Patch Transdermal every 24 hours  norepinephrine Infusion 0.05 MICROgram(s)/kG/Min (3.72 mL/Hr) IV Continuous <Continuous>  pantoprazole  Injectable 40 milliGRAM(s) IV Push every 12 hours  petrolatum Ophthalmic Ointment 1 Application(s) Left EYE at bedtime  potassium chloride   Powder 40 milliEquivalent(s) Enteral Tube every 4 hours  propofol Infusion 30 MICROgram(s)/kG/Min (14.3 mL/Hr) IV Continuous <Continuous>  sodium chloride 3%  Inhalation 4 milliLiter(s) Inhalation every 6 hours  sucralfate suspension 1 Gram(s) Enteral Tube two times a day  valproate sodium  IVPB 500 milliGRAM(s) IV Intermittent <User Schedule>  valproate sodium  IVPB 750 milliGRAM(s) IV Intermittent <User Schedule>    MEDICATIONS  (PRN):  dextrose Oral Gel 15 Gram(s) Oral once PRN Blood Glucose LESS THAN 70 milliGRAM(s)/deciliter      PHYSICAL EXAM:  General: Comfortable, sedated  Neurological: sedated  HEENT: NC/AT; EOMI, clear conjunctiva, no nasal or oropharyngeal discharge or exudates  Cardiovascular: +S1/S2, no murmurs/rubs/gallops, RRR  Respiratory: CTA B/L, no diminished breath sounds, no wheezes/rales/rhonchi, no increased work of breathing or accessory muscle use  Gastrointestinal: soft, NT/ND; active BSx4 quadrants  Genitourinary: no suprapubic tenderness, no CVA tenderness  Extremities: no edema, clubbing or cyanosis  Vascular: 2+ radial, DP/PT pulses B/L  Skin: no rashes  Lines/Drains:     Care Discussed with Consultants/Other Providers [ x] YES  [ ] NO Patient is a 89y old  Male who presents with a chief complaint of COVID19, Chest pain (02 Feb 2024 16:25)      INTERVAL HPI/OVERNIGHT EVENTS:   No overnight events   Afebrile, hemodynamically stable. Inserted delong ovn. Given bumex x1.     ICU Vital Signs Last 24 Hrs  T(C): 36.8 (03 Feb 2024 04:00), Max: 36.8 (03 Feb 2024 04:00)  T(F): 98.2 (03 Feb 2024 04:00), Max: 98.2 (03 Feb 2024 04:00)  HR: 95 (03 Feb 2024 07:44) (76 - 101)  BP: 108/63 (03 Feb 2024 06:00) (108/63 - 160/68)  BP(mean): 74 (03 Feb 2024 06:00) (64 - 106)  ABP: --  ABP(mean): --  RR: 20 (03 Feb 2024 06:00) (12 - 25)  SpO2: 100% (03 Feb 2024 07:44) (98% - 100%)    O2 Parameters below as of 03 Feb 2024 06:00  Patient On (Oxygen Delivery Method): AC20//P+5    O2 Concentration (%): 40      I&O's Summary    02 Feb 2024 07:01  -  03 Feb 2024 07:00  --------------------------------------------------------  IN: 2008.3 mL / OUT: 1703 mL / NET: 305.3 mL          LABS:                        7.1    8.99  )-----------( 377      ( 03 Feb 2024 04:24 )             22.5     02-03    151<H>  |  110<H>  |  35<H>  ----------------------------<  94  3.2<L>   |  30  |  1.89<H>    Ca    8.2<L>      03 Feb 2024 04:24  Phos  2.2     02-03  Mg     2.30     02-03    TPro  6.1  /  Alb  2.3<L>  /  TBili  0.3  /  DBili  x   /  AST  46<H>  /  ALT  52<H>  /  AlkPhos  71  02-03    PT/INR - ( 03 Feb 2024 04:24 )   PT: 13.7 sec;   INR: 1.23 ratio         PTT - ( 03 Feb 2024 04:24 )  PTT:29.0 sec  Urinalysis Basic - ( 03 Feb 2024 04:24 )    Color: x / Appearance: x / SG: x / pH: x  Gluc: 94 mg/dL / Ketone: x  / Bili: x / Urobili: x   Blood: x / Protein: x / Nitrite: x   Leuk Esterase: x / RBC: x / WBC x   Sq Epi: x / Non Sq Epi: x / Bacteria: x      CAPILLARY BLOOD GLUCOSE      POCT Blood Glucose.: 113 mg/dL (03 Feb 2024 06:00)  POCT Blood Glucose.: 158 mg/dL (02 Feb 2024 23:55)  POCT Blood Glucose.: 233 mg/dL (02 Feb 2024 18:12)  POCT Blood Glucose.: 306 mg/dL (02 Feb 2024 11:53)    ABG - ( 03 Feb 2024 04:24 )  pH, Arterial: 7.51  pH, Blood: x     /  pCO2: 43    /  pO2: 43    / HCO3: 34    / Base Excess: 10.3  /  SaO2: 75.2                RADIOLOGY & ADDITIONAL TESTS:    Consultant(s) Notes Reviewed:  [x ] YES  [ ] NO    MEDICATIONS  (STANDING):  albuterol/ipratropium for Nebulization 3 milliLiter(s) Nebulizer every 6 hours  artificial  tears Solution 1 Drop(s) Left EYE four times a day  aspirin Suppository 300 milliGRAM(s) Rectal daily  chlorhexidine 0.12% Liquid 15 milliLiter(s) Oral Mucosa every 12 hours  chlorhexidine 2% Cloths 1 Application(s) Topical daily  dextrose 5%. 1000 milliLiter(s) (50 mL/Hr) IV Continuous <Continuous>  dextrose 5%. 1000 milliLiter(s) (100 mL/Hr) IV Continuous <Continuous>  dextrose 50% Injectable 25 Gram(s) IV Push once  dextrose 50% Injectable 12.5 Gram(s) IV Push once  dextrose 50% Injectable 25 Gram(s) IV Push once  escitalopram 10 milliGRAM(s) Oral daily  glucagon  Injectable 1 milliGRAM(s) IntraMuscular once  heparin   Injectable 5000 Unit(s) SubCutaneous every 8 hours  insulin glargine Injectable (LANTUS) 15 Unit(s) SubCutaneous <User Schedule>  insulin lispro (ADMELOG) corrective regimen sliding scale   SubCutaneous every 6 hours  levETIRAcetam  IVPB 500 milliGRAM(s) IV Intermittent every 12 hours  lidocaine   4% Patch 1 Patch Transdermal every 24 hours  norepinephrine Infusion 0.05 MICROgram(s)/kG/Min (3.72 mL/Hr) IV Continuous <Continuous>  pantoprazole  Injectable 40 milliGRAM(s) IV Push every 12 hours  petrolatum Ophthalmic Ointment 1 Application(s) Left EYE at bedtime  potassium chloride   Powder 40 milliEquivalent(s) Enteral Tube every 4 hours  propofol Infusion 30 MICROgram(s)/kG/Min (14.3 mL/Hr) IV Continuous <Continuous>  sodium chloride 3%  Inhalation 4 milliLiter(s) Inhalation every 6 hours  sucralfate suspension 1 Gram(s) Enteral Tube two times a day  valproate sodium  IVPB 500 milliGRAM(s) IV Intermittent <User Schedule>  valproate sodium  IVPB 750 milliGRAM(s) IV Intermittent <User Schedule>    MEDICATIONS  (PRN):  dextrose Oral Gel 15 Gram(s) Oral once PRN Blood Glucose LESS THAN 70 milliGRAM(s)/deciliter      PHYSICAL EXAM:  General: Comfortable, sedated  Neurological: sedated, intubated   HEENT: clear conjunctiva, no nasal or oropharyngeal discharge or exudates  Cardiovascular: +S1/S2, no murmurs/rubs/gallops, RRR  Respiratory: CTA B/L, no diminished breath sounds, no wheezes/rales/rhonchi, no increased work of breathing or accessory muscle use  Gastrointestinal: soft, NT/ND; active BSx4 quadrants  Extremities: UE swelling b/l      Care Discussed with Consultants/Other Providers [ x] YES  [ ] NO

## 2024-02-03 NOTE — PROGRESS NOTE ADULT - ATTENDING COMMENTS
Mr. Foss is a 89 yo man with sudden worsening neurological status with focal findings.  Intracranial and extracranial atherosclerosis.   The differential diagnosis includes: stroke, toxic metabolic septic encephalopathy.  Aspirin, statin.    I had extensive discussion with epileptology attending about the clinical and EEG findings.  EEG is consistent with metabolic encephalopathy and not seizure.  There is no indication to treat with antiseizure medications at this time.    They recommend:    Taper off all propofol and midazolam and observe the resultant EEG changes and then decide next steps tomorrow.  Please use precedex for necessary clinical sedation.  Continue levetiracetam 500 mg Q12H  Taper valproic acid but the son requested that we only change one thing at a time - continue valproic acid to 600 mg IV q6hr for now.   We may taper off VPA tomorrow after further discussion.   Continue EEG.  D/W neurology resident who discussed with primary team.    Thank you    There is a high probability of sudden, clinically significant, or life threatening deterioration in the patient’s condition which requires the highest level of physician preparedness to intervene urgently.  Critical care time:  65 minutes. Mr. Foss is a 89 yo man with sudden worsening neurological status with focal findings.  Intracranial and extracranial atherosclerosis.   The differential diagnosis includes: stroke, toxic metabolic septic encephalopathy.  Aspirin, statin.    I had extensive discussion with epileptology attending about the clinical and EEG findings.  EEG is consistent with metabolic encephalopathy and not seizure.  There is no indication to treat with antiseizure medications at this time.    They recommend:    Taper off all propofol and midazolam and observe the resultant EEG changes and then decide next steps tomorrow.  Please use precedex for necessary clinical sedation.  Continue levetiracetam 500 mg Q12H  Taper valproic acid but the son requested that we only change one thing at a time - continue valproic acid to 600 mg IV q6hr for now.   We may taper off VPA tomorrow after further discussion.   Continue EEG.  D/W neurology resident who discussed with primary team.    D/W son  Thank you    There is a high probability of sudden, clinically significant, or life threatening deterioration in the patient’s condition which requires the highest level of physician preparedness to intervene urgently.  Critical care time:  65 minutes.

## 2024-02-03 NOTE — PROGRESS NOTE ADULT - ASSESSMENT
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 Patient states that he has been having a dry cough for the past week, worsened over the past few days. Denies SOB with the cough, denies hemoptysis. Reports fevers at home to 101.5 with associated diaphoresis. Said that he has significant pinprick like b/l chest pain with his cough but denies any chest pain when not coughing or with ambulation. Also reports rhinorrhea and sore throat. Reports generalized weakness and poor appetite. States his daughter was visiting him over the week end last week and she was positive for COVID19. Patient tested positive for COVID19 2 days prior and was started on paxlovid as outpatient. Of note, family states that the patient has had worsening confusion at home over the past 6 months (becoming disoriented to time) but recently has been more confused, talking about seeing people that are not present.   On arrival to the ED his vitals were T 98 -> 99.2, P 80, /72, RR 18, ) sat 100% RA. His lab work showed leukocytosis with neutrophilia and bandemia, MABLE, mild hyponatremia, indeterminant but stable troponins, and elevated lactate to 2.3. His COVID19 swab was positive. His CXR showed bibasilar crackles.  (23 Dec 2023 22:51):  pt has been here for past two weeks and now pulm called fro excessive secretions   he is able to answer simple questions:  grand sons at bedside:     Covid / Resp failure/on 2 L of oxygen  :  CADS/P CABG  DM  CKD  HTN  CVA X 3    Acute Cholecystitis   -New:  s/p perc asa tune:   -on antibiotics   -on no vasopressors:    -id following   1/29 : cont antibiotics  1/30:  on antibiotics : meropenem  2/1: now extubated:  but ac on chr hypercarbic resp failure:  started on bipap : lethargic today too :   2/2: remains extubated but on bipap : mental status with not much improvements   2/3: defer to MICU   AMS:   -Neurology note noted:  Myoclonic seizures, ictal-interictal continuum - not status epilepticus.   -on valproate : neurology following   Covid / Resp failure/on 2 L of oxygen:  -he was treated for covid before:   -he has excessive secretions  -keep o2 sao2 above 90%  -add BD and mucomyst: ;  -add mucinex:   1/10: he seems to be doing  ok: cont mucomyst and bd   1/11 ?: add benzonatate and Robitussin prm : dc dornase  1/12: seems OK: no sob:  no cough : no phlegm : has gurgling sound in throat:  looks comfortable  1/14: he is on room air:  with good sao2:  finished covid rx:  cont current rx:  high risk for aspiration as he has gurgling secretions in throat   1/15: would do ct chest he seems to have increasing effusions:  his ct scan from last week of december:  has not much of effusions: however will try lasix : [facility]    1/16: doing  ok : no os:b has secretions still : change to percussion bed:  cont bd  1/17: seems stable:  no sob: on 3 L of oxygen : should add ATC lasix to me as he has formed new pleural effusions:  echo with mod AS   1/18; dw pts son : who is a neurologist:  he has secretions in chest with atelectasis and yovani effusions with increased secretions:  the son requests bronchoscopy:  I have explained the advantages and disadvantages of the bronch at this age and he might not be able to be extubated soon after procedure:  but they want to go ahead with it: IP called   He will remain art risk for recurrent aspiration and atelectasis even after the procedure and they understand that    1/19: FOR BRONCH TODAY  : AGAIN CONFIRMED WITH NEUROLOGISTS SON  1/20 : events noted:  was successful extubated after the procedure:  but then he desaturated with increasing secretions and ended up on high flow and adm to MICU : on recent chest xray has mod large effusion on rigth side:  I think it needs a tap:  would defer to [facility] MICU team   ; [facility] cultures are still pending  1/21: dw fellow  consider tapping on rigth saide:  his PCP and son at bedside:  he is not obviously sob but still on 100%  high flow   1/22: he is doing poorly:  WBC increased:  bronch culturs with staph : nares with staph ? add vanco:  defer to MICU : in addition:  he has large effusion 0on rigth side: ? chest tube:  but brillinta needs to be stopped : [facility] attending  1/23: got intubated : sedated on vasopressors:  s/p brocnh : [facility] attending again  : possibly needs tap on rigth side   1/24: cont current rx:  defer to primary team  : s/p bronch   -cont current medications   1/28: seems same to me  : intubated and is on precedex:  cont current pulm care:  on antibiotics  1/29: on cpap trials today : for possible extubation;  son at bedside; cont antibiotics  1/30: seems to be doing ok : intubated and on precedex  : on cpap trials:  extubation per primary team  2/1: extubated:  on bipap:  monitor abg:  on meropenem  2/2: clinically looks the same:  not much improved mental status: ABG last night was pretty reasonable:  cont to monitor  2/3: intubated again for myotonic jerks:  cont current treatment:    DM  monitor and control   CKD  -cont current meds   HTN   -controlled  1/28:   -not on vasopressors:  antihypertensives being held  1/29: remains hemodynamically stable   1/30 : blood pressure  1/31: remained off pressors  2/2 hemodynamically stable: off pressors  2/3: he is on vasopressors now:   CVA X 3  -doing ok :     [facility] family  and team  1/28: currently intubated   1/29: on cpap trials for possible extubation   1/30: intubated:  and precedex  1/31; off precedex but still lethargic : defer to neurology / MICU    2/2: neuro follo w up : has had cvx x 3 before  GI Bleed:   -secondary to Dieulafoy's lesion:  defer to primary team :    dvt prophylaxis:    dw team

## 2024-02-03 NOTE — PROGRESS NOTE ADULT - SUBJECTIVE AND OBJECTIVE BOX
Date of Service: 02-03-24 @ 10:09    Patient is a 89y old  Male who presents with a chief complaint of COVID19, Chest pain (03 Feb 2024 08:03)      Any change in ROS: events noted:  intubated and carolyn cont EEG;  was found to be in status  /; on propofol now:     MEDICATIONS  (STANDING):  albuterol/ipratropium for Nebulization 3 milliLiter(s) Nebulizer every 6 hours  artificial  tears Solution 1 Drop(s) Left EYE four times a day  aspirin Suppository 300 milliGRAM(s) Rectal daily  chlorhexidine 0.12% Liquid 15 milliLiter(s) Oral Mucosa every 12 hours  chlorhexidine 2% Cloths 1 Application(s) Topical daily  dextrose 5%. 1000 milliLiter(s) (50 mL/Hr) IV Continuous <Continuous>  dextrose 5%. 1000 milliLiter(s) (100 mL/Hr) IV Continuous <Continuous>  dextrose 50% Injectable 25 Gram(s) IV Push once  dextrose 50% Injectable 12.5 Gram(s) IV Push once  dextrose 50% Injectable 25 Gram(s) IV Push once  escitalopram 10 milliGRAM(s) Oral daily  glucagon  Injectable 1 milliGRAM(s) IntraMuscular once  heparin   Injectable 5000 Unit(s) SubCutaneous every 8 hours  insulin glargine Injectable (LANTUS) 15 Unit(s) SubCutaneous <User Schedule>  insulin lispro (ADMELOG) corrective regimen sliding scale   SubCutaneous every 6 hours  levETIRAcetam  IVPB 500 milliGRAM(s) IV Intermittent every 12 hours  lidocaine   4% Patch 1 Patch Transdermal every 24 hours  norepinephrine Infusion 0.05 MICROgram(s)/kG/Min (3.72 mL/Hr) IV Continuous <Continuous>  pantoprazole  Injectable 40 milliGRAM(s) IV Push every 12 hours  petrolatum Ophthalmic Ointment 1 Application(s) Left EYE at bedtime  propofol Infusion 30.055 MICROgram(s)/kG/Min (14.3 mL/Hr) IV Continuous <Continuous>  sodium chloride 3%  Inhalation 4 milliLiter(s) Inhalation every 6 hours  sucralfate suspension 1 Gram(s) Enteral Tube two times a day  valproate sodium  IVPB 500 milliGRAM(s) IV Intermittent <User Schedule>  valproate sodium  IVPB 750 milliGRAM(s) IV Intermittent <User Schedule>    MEDICATIONS  (PRN):  dextrose Oral Gel 15 Gram(s) Oral once PRN Blood Glucose LESS THAN 70 milliGRAM(s)/deciliter    Vital Signs Last 24 Hrs  T(C): 36.5 (03 Feb 2024 08:00), Max: 36.8 (03 Feb 2024 04:00)  T(F): 97.7 (03 Feb 2024 08:00), Max: 98.2 (03 Feb 2024 04:00)  HR: 77 (03 Feb 2024 10:00) (76 - 99)  BP: 125/50 (03 Feb 2024 10:00) (108/63 - 160/68)  BP(mean): 65 (03 Feb 2024 10:00) (64 - 105)  RR: 20 (03 Feb 2024 10:00) (12 - 25)  SpO2: 100% (03 Feb 2024 10:00) (98% - 100%)    Parameters below as of 03 Feb 2024 10:00  Patient On (Oxygen Delivery Method): ventilator    O2 Concentration (%): 40  Mode: AC/ CMV (Assist Control/ Continuous Mandatory Ventilation)  RR (machine): 20  TV (machine): 450  FiO2: 40  PEEP: 5  ITime: 0.6  MAP: 12  PIP: 36    I&O's Summary    02 Feb 2024 07:01  -  03 Feb 2024 07:00  --------------------------------------------------------  IN: 2008.3 mL / OUT: 1703 mL / NET: 305.3 mL    03 Feb 2024 07:01  -  03 Feb 2024 10:09  --------------------------------------------------------  IN: 349.6 mL / OUT: 400 mL / NET: -50.4 mL          Physical Exam:   GENERAL: NAD, well-groomed, well-developed  HEENT: JAVAD/   Atraumatic, Normocephalic  ENMT: No tonsillar erythema, exudates, or enlargement; Moist mucous membranes, Good dentition, No lesions  NECK: Supple, No JVD, Normal thyroid  CHEST/LUNG: Clear to auscultaion  CVS: Regular rate and rhythm; No murmurs, rubs, or gallops  GI: : Soft, Nontender, Nondistended; Bowel sounds present  NERVOUS SYSTEM:  sedated and intubated  EXTREMITIES:  - edema  LYMPH: No lymphadenopathy noted  SKIN: No rashes or lesions  ENDOCRINOLOGY: No Thyromegaly  PSYCHsedated    Labs:  ABG - ( 03 Feb 2024 04:24 )  pH, Arterial: 7.51  pH, Blood: x     /  pCO2: 43    /  pO2: 43    / HCO3: 34    / Base Excess: 10.3  /  SaO2: 75.2            28, 31, 32, 30                            7.1    8.99  )-----------( 377      ( 03 Feb 2024 04:24 )             22.5                         7.1    8.63  )-----------( 398      ( 02 Feb 2024 18:21 )             24.2                         7.6    9.20  )-----------( 383      ( 02 Feb 2024 06:19 )             24.7                         7.4    13.91 )-----------( 417      ( 01 Feb 2024 01:31 )             25.4                         7.9    15.56 )-----------( 332      ( 31 Jan 2024 08:13 )             26.4                         7.9    18.70 )-----------( 403      ( 31 Jan 2024 00:50 )             26.0     02-03    151<H>  |  110<H>  |  35<H>  ----------------------------<  94  3.2<L>   |  30  |  1.89<H>  02-02    146<H>  |  108<H>  |  37<H>  ----------------------------<  215<H>  4.0   |  25  |  1.86<H>  02-02    148<H>  |  108<H>  |  39<H>  ----------------------------<  327<H>  4.0   |  30  |  1.87<H>  02-02    148<H>  |  108<H>  |  39<H>  ----------------------------<  320<H>  4.2   |  29  |  1.95<H>  02-01    151<H>  |  110<H>  |  39<H>  ----------------------------<  258<H>  4.2   |  27  |  1.88<H>  02-01    153<H>  |  111<H>  |  40<H>  ----------------------------<  216<H>  4.5   |  27  |  1.87<H>  02-01    152<H>  |  111<H>  |  42<H>  ----------------------------<  215<H>  4.7   |  28  |  1.87<H>  02-01    151<H>  |  111<H>  |  40<H>  ----------------------------<  205<H>  4.5   |  29  |  1.87<H>  01-31    147<H>  |  108<H>  |  38<H>  ----------------------------<  211<H>  4.6   |  25  |  1.64<H>  01-31    148<H>  |  108<H>  |  36<H>  ----------------------------<  188<H>  4.4   |  27  |  1.47<H>    Ca    8.2<L>      03 Feb 2024 04:24  Ca    8.3<L>      02 Feb 2024 18:21  Ca    8.4      02 Feb 2024 11:59  Ca    8.5      02 Feb 2024 06:19  Ca    8.3<L>      01 Feb 2024 21:34  Phos  2.2     02-03  Phos  3.7     02-02  Phos  4.2     02-02  Phos  4.5     02-01  Phos  4.7     02-01  Mg     2.30     02-03  Mg     2.50     02-02  Mg     2.60     02-02  Mg     2.50     02-01  Mg     2.60     02-01    TPro  6.1  /  Alb  2.3<L>  /  TBili  0.3  /  DBili  x   /  AST  46<H>  /  ALT  52<H>  /  AlkPhos  71  02-03  TPro  6.3  /  Alb  2.4<L>  /  TBili  0.2  /  DBili  x   /  AST  56<H>  /  ALT  61<H>  /  AlkPhos  67  02-02  TPro  6.8  /  Alb  2.6<L>  /  TBili  0.3  /  DBili  x   /  AST  85<H>  /  ALT  70<H>  /  AlkPhos  72  02-02  TPro  6.8  /  Alb  2.6<L>  /  TBili  0.3  /  DBili  x   /  AST  64<H>  /  ALT  42<H>  /  AlkPhos  68  02-01  TPro  6.3  /  Alb  2.3<L>  /  TBili  0.3  /  DBili  x   /  AST  29  /  ALT  20  /  AlkPhos  68  01-31  TPro  6.5  /  Alb  2.5<L>  /  TBili  0.3  /  DBili  x   /  AST  16  /  ALT  11  /  AlkPhos  80  01-31    CAPILLARY BLOOD GLUCOSE      POCT Blood Glucose.: 113 mg/dL (03 Feb 2024 06:00)  POCT Blood Glucose.: 158 mg/dL (02 Feb 2024 23:55)  POCT Blood Glucose.: 233 mg/dL (02 Feb 2024 18:12)  POCT Blood Glucose.: 306 mg/dL (02 Feb 2024 11:53)      LIVER FUNCTIONS - ( 03 Feb 2024 04:24 )  Alb: 2.3 g/dL / Pro: 6.1 g/dL / ALK PHOS: 71 U/L / ALT: 52 U/L / AST: 46 U/L / GGT: x           PT/INR - ( 03 Feb 2024 04:24 )   PT: 13.7 sec;   INR: 1.23 ratio         PTT - ( 03 Feb 2024 04:24 )  PTT:29.0 sec  Urinalysis Basic - ( 03 Feb 2024 04:24 )    Color: x / Appearance: x / SG: x / pH: x  Gluc: 94 mg/dL / Ketone: x  / Bili: x / Urobili: x   Blood: x / Protein: x / Nitrite: x   Leuk Esterase: x / RBC: x / WBC x   Sq Epi: x / Non Sq Epi: x / Bacteria: x        ra< from: CT Angio Neck w/ IV Cont (01.31.24 @ 21:00) >  CT PERFUSION  rapid imaging was conducted.    No core infarct or significant delay in mean transit time is noted.    Hypoperfusion is noted in the right MCA distribution at the 4 second    Tmax thresholds.    CBF<30% volume: 0 mL  Tmax>6.0 s volume: 0 mL  Mismatch volume: 0 mL  Mismatch ratio: None    IMPRESSION:      1. No CT evidence ofan acute territorial infarct or hemorrhage, with   underlying volume loss. No hemorrhage or mass identified.  2. Extensive calcified plaque in the head and neck.  3. Moderate to severe narrowing left internal carotid at the origin.   Severe narrowingright internal carotid by a tandem lesion 1.5 cm above   the bifurcation with a narrowed internal carotid beyond the lesion.  4. Extensive calcified plaque both cavernous and supraclinoid carotid   arteries with narrowing but no occlusion. Mild narrowing proximal right   M1 segment. Moderate narrowing both vertebral V4 segments  5. No perfusion abnormality at the Tmax 6 second threshold, but moderate   hypoperfusion in the right MCA territory at the 4 second threshold    Follow-up MR imaging of the brain recommended for further assessment.    --- End of Report ---    < end of copied text >  < from: CT Head No Cont (01.31.24 @ 21:00) >  there is moderate to severe narrowing of the left internal carotid at the   origin. Complex plaque is noted.  INTERNAL CAROTID ARTERIES: Calcified plaque is noted the 1.5 cm above the   bifurcation, with high-grade stenosis at the level of this tendon lesion.   The internal carotid is diffusely narrowed beyond this calcified plaque.   Left internal is widely patent.  VERTEBRALS bilaterally patent, with calcified plaque of both ostia. Also   calcified plaque noted in both V4 segments.  MISCELLANEOUS:  Generative changes noted in the spine. Large pleural   effusions noted in the chest bilaterally.      BRAIN the study is degraded by motion. There are involutional changes   with moderate volume loss.No CT evidence of an acute infarct, hemorrhage,   or mass. No hydrocephalus or collections.Brain stem and cerebellum are   unremarkable    Extensive calcified plaque noted in both cavernous and supraclinoid   carotid arteries, but without occlusion.      Both anterior cerebral arteries are patent. There is focal plaque at the   right A1 A2 junction.    Both middle cerebral arteries are patent. There is focal plaque in the   proximal right M1 segment with mild narrowing.    Posterior cerebral arteries are patent, but there is segmental narrowing     < end of copied text >      RECENT CULTURES:        RESPIRATORY CULTURES:          Studies  Chest X-RAY  CT SCAN Chest   Venous Dopplers: LE:   CT Abdomen  Others

## 2024-02-03 NOTE — PROGRESS NOTE ADULT - SUBJECTIVE AND OBJECTIVE BOX
Neurology Progress Note    SUBJECTIVE/OBJECTIVE/INTERVAL EVENTS: Patient seen and examined at bedside w/ neuro attending and team.     INTERVAL HISTORY: EEG report back, as follows:  EEG Classification / Summary:  Abnormal video-EEG in a lethargic patient.  -Intermittent periods of generalized periodic discharges, with triphasic morphology, at ~1 Hz.   -Moderate to severe diffuse slowing, with variable continuity as above.  Clinical Impression:  -These findings typically suggests severe metabolic encephalopathy; however, in certain clinical scenarios GPD may signify elevated risk of seizures.  -Single-lead EKG shows irregularly irregular rhythm and tachycardia.    REVIEW OF SYSTEMS: ISADORA    VITALS & EXAMINATION:  Vital Signs Last 24 Hrs  T(C): 36.6 (03 Feb 2024 12:00), Max: 36.8 (03 Feb 2024 04:00)  T(F): 97.9 (03 Feb 2024 12:00), Max: 98.2 (03 Feb 2024 04:00)  HR: 94 (03 Feb 2024 15:00) (76 - 97)  BP: 114/50 (03 Feb 2024 15:00) (108/63 - 160/68)  BP(mean): 65 (03 Feb 2024 15:00) (64 - 105)  RR: 14 (03 Feb 2024 15:00) (12 - 25)  SpO2: 100% (03 Feb 2024 15:00) (99% - 100%)    Parameters below as of 03 Feb 2024 12:00  Patient On (Oxygen Delivery Method): ventilator    O2 Concentration (%): 40    General:  Constitutional: Male, appears stated age, nontoxic, intubated + sedated  Head: Normocephalic;   Eyes: clear sclera on Right eye, Injected on Left eye  Extremities: No cyanosis;   Resp: intubated    Neurological (>12):  MS: Eyes closed. Does not track examiner or follow commands.  Language: Non-verbal  CNs: PERRL (R 2mm, L 2mm). No BTT b/l. Disconjugate gaze, no overt facial asymmetry b/l. Corneal reflex+. Adduction of L eye with no correction with OCR, slight movement of R eye with OCR.   Motor: Both upper and lower extremities have trace movement with noxious stimuli. Action induced myoclonus in b/l LE  Reflexes L/R: B/l bicep/tricep are 1+. Otherwise, 0 throughout.     ABORATORY:  CBC                       7.1    8.13  )-----------( 359      ( 03 Feb 2024 10:40 )             22.2     Chem 02-03    147<H>  |  107  |  35<H>  ----------------------------<  223<H>  3.7   |  30  |  1.84<H>    Ca    8.1<L>      03 Feb 2024 10:40  Phos  2.7     02-03  Mg     2.10     02-03    TPro  5.8<L>  /  Alb  2.3<L>  /  TBili  0.3  /  DBili  x   /  AST  42<H>  /  ALT  46<H>  /  AlkPhos  97  02-03    LFTs LIVER FUNCTIONS - ( 03 Feb 2024 10:40 )  Alb: 2.3 g/dL / Pro: 5.8 g/dL / ALK PHOS: 97 U/L / ALT: 46 U/L / AST: 42 U/L / GGT: x           Coagulopathy PT/INR - ( 03 Feb 2024 04:24 )   PT: 13.7 sec;   INR: 1.23 ratio         PTT - ( 03 Feb 2024 04:24 )  PTT:29.0 sec  Lipid Panel   A1c   Cardiac enzymes     U/A Urinalysis Basic - ( 03 Feb 2024 10:40 )    Color: x / Appearance: x / SG: x / pH: x  Gluc: 223 mg/dL / Ketone: x  / Bili: x / Urobili: x   Blood: x / Protein: x / Nitrite: x   Leuk Esterase: x / RBC: x / WBC x   Sq Epi: x / Non Sq Epi: x / Bacteria: x    STUDIES & IMAGING: (EEG, CT, MR, U/S, TTE/RICHARD):

## 2024-02-03 NOTE — PROGRESS NOTE ADULT - ATTENDING COMMENTS
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   Will continue ongoing discussion regarding tracheostomy  Prognosis guarded

## 2024-02-03 NOTE — PROGRESS NOTE ADULT - ASSESSMENT
0n Bipap  1 on 1  NAD    albuterol/ipratropium for Nebulization 3 milliLiter(s) Nebulizer every 6 hours  artificial  tears Solution 1 Drop(s) Left EYE four times a day  aspirin Suppository 300 milliGRAM(s) Rectal daily  buMETAnide Injectable 4 milliGRAM(s) IV Push every 12 hours  chlorhexidine 0.12% Liquid 15 milliLiter(s) Oral Mucosa every 12 hours  chlorhexidine 2% Cloths 1 Application(s) Topical daily  dexMEDEtomidine Infusion 0.2 MICROgram(s)/kG/Hr IV Continuous <Continuous>  dextrose 5%. 1000 milliLiter(s) IV Continuous <Continuous>  dextrose 5%. 1000 milliLiter(s) IV Continuous <Continuous>  dextrose 50% Injectable 25 Gram(s) IV Push once  dextrose 50% Injectable 12.5 Gram(s) IV Push once  dextrose 50% Injectable 25 Gram(s) IV Push once  dextrose Oral Gel 15 Gram(s) Oral once PRN  escitalopram 10 milliGRAM(s) Oral daily  glucagon  Injectable 1 milliGRAM(s) IntraMuscular once  heparin   Injectable 5000 Unit(s) SubCutaneous every 8 hours  insulin glargine Injectable (LANTUS) 15 Unit(s) SubCutaneous <User Schedule>  insulin lispro (ADMELOG) corrective regimen sliding scale   SubCutaneous every 6 hours  levETIRAcetam  IVPB 500 milliGRAM(s) IV Intermittent every 12 hours  lidocaine   4% Patch 1 Patch Transdermal every 24 hours  norepinephrine Infusion 0.05 MICROgram(s)/kG/Min IV Continuous <Continuous>  pantoprazole  Injectable 40 milliGRAM(s) IV Push every 12 hours  petrolatum Ophthalmic Ointment 1 Application(s) Left EYE at bedtime  propofol Infusion 30.055 MICROgram(s)/kG/Min IV Continuous <Continuous>  sodium chloride 3%  Inhalation 4 milliLiter(s) Inhalation every 6 hours  sucralfate suspension 1 Gram(s) Enteral Tube two times a day  valproate sodium  IVPB 600 milliGRAM(s) IV Intermittent every 6 hours      VITAL:  T(C): , Max: 36.8 (02-03-24 @ 04:00)  T(F): , Max: 98.2 (02-03-24 @ 04:00)  HR: 96 (02-03-24 @ 12:00)  BP: 139/64 (02-03-24 @ 12:00)  BP(mean): 81 (02-03-24 @ 12:00)  RR: 15 (02-03-24 @ 12:00)  SpO2: 100% (02-03-24 @ 12:00)  Wt(kg): --    02-02-24 @ 07:01  -  02-03-24 @ 07:00  --------------------------------------------------------  IN: 2008.3 mL / OUT: 1703 mL / NET: 305.3 mL    02-03-24 @ 07:01  -  02-03-24 @ 14:56  --------------------------------------------------------  IN: 935.5 mL / OUT: 1075 mL / NET: -139.5 mL        PHYSICAL EXAM:  Constitutional: no distress   HEENT: on bipap  Neck: No LAD, No JVD  Respiratory: diminished b/l  Cardiovascular: S1 and S2  Gastrointestinal: BS+, soft, NT/ND  Extremities: + l/e edema  Neurological: UTO  : + Moss  Skin: No rashes    LABS:                          7.1    8.13  )-----------( 359      ( 03 Feb 2024 10:40 )             22.2     Na(147)/K(3.7)/Cl(107)/HCO3(30)/BUN(35)/Cr(1.84)Glu(223)/Ca(8.1)/Mg(2.10)/PO4(2.7)    02-03 @ 10:40  Na(151)/K(3.2)/Cl(110)/HCO3(30)/BUN(35)/Cr(1.89)Glu(94)/Ca(8.2)/Mg(2.30)/PO4(2.2)    02-03 @ 04:24  Na(146)/K(4.0)/Cl(108)/HCO3(25)/BUN(37)/Cr(1.86)Glu(215)/Ca(8.3)/Mg(2.50)/PO4(3.7)    02-02 @ 18:21  Na(148)/K(4.0)/Cl(108)/HCO3(30)/BUN(39)/Cr(1.87)Glu(327)/Ca(8.4)/Mg(--)/PO4(--)    02-02 @ 11:59  Na(148)/K(4.2)/Cl(108)/HCO3(29)/BUN(39)/Cr(1.95)Glu(320)/Ca(8.5)/Mg(2.60)/PO4(4.2)    02-02 @ 06:19  Na(151)/K(4.2)/Cl(110)/HCO3(27)/BUN(39)/Cr(1.88)Glu(258)/Ca(8.3)/Mg(2.50)/PO4(4.5)    02-01 @ 21:34  Na(153)/K(4.5)/Cl(111)/HCO3(27)/BUN(40)/Cr(1.87)Glu(216)/Ca(8.4)/Mg(--)/PO4(--)    02-01 @ 16:29  Na(152)/K(4.7)/Cl(111)/HCO3(28)/BUN(42)/Cr(1.87)Glu(215)/Ca(8.5)/Mg(2.60)/PO4(4.7)    02-01 @ 12:05  Na(151)/K(4.5)/Cl(111)/HCO3(29)/BUN(40)/Cr(1.87)Glu(205)/Ca(8.4)/Mg(2.50)/PO4(5.1)    02-01 @ 01:31    Urinalysis Basic - ( 03 Feb 2024 10:40 )    Color: x / Appearance: x / SG: x / pH: x  Gluc: 223 mg/dL / Ketone: x  / Bili: x / Urobili: x   Blood: x / Protein: x / Nitrite: x   Leuk Esterase: x / RBC: x / WBC x   Sq Epi: x / Non Sq Epi: x / Bacteria: x              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) who presents to the hospital for COVID19 infection and chest pain  MABLE - Renal fxn slightly higher/ stable  Hypophosphatemia- improving  Hypokalemia -  a/w 40mEq potassium chloride  repletion   Hypertensive urgency -resolved -BP acceptable at present  Pulm - resp failure - on bipap    s/p EEG  today - eval noted  Hypernatremia - improving, can increase free water to 300cc [id number]    1 Renal - scr stable.  no objection to lasix 40mg IV once PRN, in case of distress (last given 1/30/24)  free water 300 cc  [id number]  Trend Phos level daily   4 CV - BP acceptable at present  5 Vasc - s/p SMA stent placement;   6 Sx - s/p HIDA + for acute asa, medical management  7 GI - s/p EGD clipping of bleeding duodenal ulcers   8 Anemia-continue to trend H/H; suggest PRBC for Hgb <7.0; transfuse per primary team   9 Pulm -on bipap  10- ID -afebrile   11 Glycemic control as able         Larry Cohen NP-BC  [employer]  [phone]

## 2024-02-03 NOTE — EEG REPORT - NS EEG TEXT BOX
SHAIK DONOHUE [facility]-[id number]     Study Date: 2/2/24 0800 - 2/3/24 0800  Duration: 24 hr  --------------------------------------------------------------------------------------------------  History:  CC/ HPI Patient is a 89y old  Male who presents with a chief complaint of COVID19, Chest pain (02 Feb 2024 07:32)    MEDICATIONS  (STANDING):  albuterol/ipratropium for Nebulization 3 milliLiter(s) Nebulizer every 6 hours  artificial  tears Solution 1 Drop(s) Left EYE four times a day  aspirin Suppository 300 milliGRAM(s) Rectal daily  chlorhexidine 2% Cloths 1 Application(s) Topical daily  dextrose 5%. 1000 milliLiter(s) (100 mL/Hr) IV Continuous <Continuous>  dextrose 50% Injectable 25 Gram(s) IV Push once  dextrose 50% Injectable 25 Gram(s) IV Push once  dextrose 50% Injectable 12.5 Gram(s) IV Push once  dextrose Oral Gel 15 Gram(s) Oral once  escitalopram 10 milliGRAM(s) Oral daily  glucagon  Injectable 1 milliGRAM(s) IntraMuscular once  insulin lispro (ADMELOG) corrective regimen sliding scale   SubCutaneous every 6 hours  levETIRAcetam  IVPB 500 milliGRAM(s) IV Intermittent every 12 hours  lidocaine   4% Patch 1 Patch Transdermal every 24 hours  pantoprazole  Injectable 40 milliGRAM(s) IV Push every 12 hours  petrolatum Ophthalmic Ointment 1 Application(s) Left EYE at bedtime  sodium chloride 3%  Inhalation 4 milliLiter(s) Inhalation every 6 hours  sucralfate suspension 1 Gram(s) Enteral Tube two times a day  valproate sodium  IVPB 500 milliGRAM(s) IV Intermittent every 8 hours    --------------------------------------------------------------------------------------------------  Study Interpretation:    [[[Abbreviation Key:  PDR=alpha rhythm/posterior dominant rhythm. A-P=anterior posterior.  Amplitude: ‘very low’:<20; ‘low’:20-49; ‘medium’:; ‘high’:>150uV.  Persistence for periodic/rhythmic patterns (% of epoch) ‘rare’:<1%; ‘occasional’:1-10%; ‘frequent’:10-50%; ‘abundant’:50-90%; ‘continuous’:>90%.  Persistence for sporadic discharges: ‘rare’:<1/hr; ‘occasional’:1/min-1/hr; ‘frequent’:>1/min; ‘abundant’:>1/10 sec.  RPP=rhythmic and periodic patterns; GRDA=generalized rhythmic delta activity; FIRDA=frontal intermittent GRDA; LRDA=lateralized rhythmic delta activity; TIRDA=temporal intermittent rhythmic delta activity;  LPD=PLED=lateralized periodic discharges; GPD=generalized periodic discharges; BIPDs =bilateral independent periodic discharges; Mf=multifocal; SIRPDs=stimulus induced rhythmic, periodic, or ictal appearing discharges; BIRDs=brief potentially ictal rhythmic discharges >4 Hz, lasting .5-10s; PFA (paroxysmal bursts >13 Hz or =8 Hz <10s).  Modifiers: +F=with fast component; +S=with spike component; +R=with rhythmic component.  S-B=burst suppression pattern.  Max=maximal. N1-drowsy; N2-stage II sleep; N3-slow wave sleep. SSS/BETS=small sharp spikes/benign epileptiform transients of sleep. HV=hyperventilation; PS=photic stimulation]]]    Daily EEG Visual Analysis    FINDINGS:      Background:  The background is initially discontinuous and symmetric, later with burst suppression after intubation around 4pm with variable interburst inteval throughout the recording likely associated with level of sedation. Increased continuity towards the end of the recording since ~6am.  The predominant background consists of diffuse polymorphic delta slowing, intermittent theta. There is no posterior dominant rhythm.     Background Slowing:  Generalized slowing: As above  Focal slowing: None    State Changes:   Absent    Interictal Findings:  Intermittent periods of generalized periodic discharges, with triphasic morphology, at ~1 Hz.     Electrographic and Electroclinical seizures:  No clear seizures observed.    Other Clinical Events:  No press button events.    Activation Procedures:   Hyperventilation is not performed.    Photic stimulation is not performed.    Artifacts:  Intermittent myogenic and movement artifacts are present.    EKG:  Single-lead EKG shows in termittent irregularly irregular rhythm and tachycardia. Occasional PVCs.    EEG Classification / Summary:  Abnormal video-EEG in a lethargic patient.  -Intermittent periods of generalized periodic discharges, with triphasic morphology, at ~1 Hz.   -Moderate to severe diffuse slowing, with variable continuity as above.    Clinical Impression:  -These findings typically suggests severe metabolic encephalopathy; however, in certain clinical scenarios GPD may signify elevated risk of seizures.  -Single-lead EKG shows irregularly irregular rhythm and tachycardia.  This is a preliminary report pending attending review and attestation.    Yessenia Souza MD  Fellow, [facility]      SHAIK DONOHUE [facility]-[id number]     Study Date: 2/2/24 0800 - 2/3/24 0800  Duration: 24 hr  --------------------------------------------------------------------------------------------------  History:  CC/ HPI Patient is a 89y old  Male who presents with a chief complaint of COVID19, Chest pain (02 Feb 2024 07:32)    MEDICATIONS  (STANDING):  albuterol/ipratropium for Nebulization 3 milliLiter(s) Nebulizer every 6 hours  artificial  tears Solution 1 Drop(s) Left EYE four times a day  aspirin Suppository 300 milliGRAM(s) Rectal daily  chlorhexidine 2% Cloths 1 Application(s) Topical daily  dextrose 5%. 1000 milliLiter(s) (100 mL/Hr) IV Continuous <Continuous>  dextrose 50% Injectable 25 Gram(s) IV Push once  dextrose 50% Injectable 25 Gram(s) IV Push once  dextrose 50% Injectable 12.5 Gram(s) IV Push once  dextrose Oral Gel 15 Gram(s) Oral once  escitalopram 10 milliGRAM(s) Oral daily  glucagon  Injectable 1 milliGRAM(s) IntraMuscular once  insulin lispro (ADMELOG) corrective regimen sliding scale   SubCutaneous every 6 hours  levETIRAcetam  IVPB 500 milliGRAM(s) IV Intermittent every 12 hours  lidocaine   4% Patch 1 Patch Transdermal every 24 hours  pantoprazole  Injectable 40 milliGRAM(s) IV Push every 12 hours  petrolatum Ophthalmic Ointment 1 Application(s) Left EYE at bedtime  sodium chloride 3%  Inhalation 4 milliLiter(s) Inhalation every 6 hours  sucralfate suspension 1 Gram(s) Enteral Tube two times a day  valproate sodium  IVPB 500 milliGRAM(s) IV Intermittent every 8 hours    --------------------------------------------------------------------------------------------------  Study Interpretation:    [[[Abbreviation Key:  PDR=alpha rhythm/posterior dominant rhythm. A-P=anterior posterior.  Amplitude: ‘very low’:<20; ‘low’:20-49; ‘medium’:; ‘high’:>150uV.  Persistence for periodic/rhythmic patterns (% of epoch) ‘rare’:<1%; ‘occasional’:1-10%; ‘frequent’:10-50%; ‘abundant’:50-90%; ‘continuous’:>90%.  Persistence for sporadic discharges: ‘rare’:<1/hr; ‘occasional’:1/min-1/hr; ‘frequent’:>1/min; ‘abundant’:>1/10 sec.  RPP=rhythmic and periodic patterns; GRDA=generalized rhythmic delta activity; FIRDA=frontal intermittent GRDA; LRDA=lateralized rhythmic delta activity; TIRDA=temporal intermittent rhythmic delta activity;  LPD=PLED=lateralized periodic discharges; GPD=generalized periodic discharges; BIPDs =bilateral independent periodic discharges; Mf=multifocal; SIRPDs=stimulus induced rhythmic, periodic, or ictal appearing discharges; BIRDs=brief potentially ictal rhythmic discharges >4 Hz, lasting .5-10s; PFA (paroxysmal bursts >13 Hz or =8 Hz <10s).  Modifiers: +F=with fast component; +S=with spike component; +R=with rhythmic component.  S-B=burst suppression pattern.  Max=maximal. N1-drowsy; N2-stage II sleep; N3-slow wave sleep. SSS/BETS=small sharp spikes/benign epileptiform transients of sleep. HV=hyperventilation; PS=photic stimulation]]]    Daily EEG Visual Analysis    FINDINGS:      Background:  The background is initially discontinuous and symmetric, later with burst suppression after intubation around 4pm with variable interburst inteval throughout the recording likely associated with level of sedation. Increased continuity towards the end of the recording since ~6am.  The predominant background consists of diffuse polymorphic delta slowing, intermittent theta. There is no posterior dominant rhythm.     Background Slowing:  Generalized slowing: As above  Focal slowing: None    State Changes:   Absent    Interictal Findings:  Intermittent periods of generalized periodic discharges, with triphasic morphology, at ~1 Hz.     Electrographic and Electroclinical seizures:  No clear seizures observed.    Other Clinical Events:  No press button events.    Activation Procedures:   Hyperventilation is not performed.    Photic stimulation is not performed.    Artifacts:  Intermittent myogenic and movement artifacts are present.    EKG:  Single-lead EKG shows in termittent irregularly irregular rhythm and tachycardia. Occasional PVCs.    EEG Classification / Summary:  Abnormal video-EEG in a lethargic patient.  -Intermittent periods of generalized periodic discharges, with triphasic morphology, at ~1 Hz.   -Moderate to severe diffuse slowing, with variable continuity as above.    Clinical Impression:  -These findings typically suggests severe metabolic encephalopathy; however, in certain clinical scenarios GPD may signify elevated risk of seizures.  -Single-lead EKG shows irregularly irregular rhythm and tachycardia.    Yessenia Souza MD  Fellow, [facility]    Rafy Kulkarni MD PhD  Director, Epilepsy Division, [facility]

## 2024-02-03 NOTE — PROGRESS NOTE ADULT - ASSESSMENT
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     PLAN  =====Neurologic=====  #CVA  - prior CVA x3 with no residual deficits  #myoclonic jerks  - on Keppra, increased per neuro  - holding home  gabapentin and memantine in setting of somnolence  - continue lexapro if able to take po   - wean precedex as tolerated   - f/u 24 hour EEG  - CT/CTA negative for stroke       =====Pulmonary=====  #COVID  - s/p paxlovid and 1 dose remdesivir while inpatient  #Pleural effusions b/l  - CT chest from 1/16 showing new small bilateral pleural effusions/ atelectasis/ patchy bilateral ground-glass opacities are consistent with pulmonary edema.  - s/p zosyn for aspiration PNA from 1/20- 1/28   - POCUS with resolution of b/l pleural effusions, still holding brilinta for possible reaccumulation requiring pleural tap     - trend blood gas/CO2 as pt on bipap and worsens may need repeat intubation     =====Cardiovascular=====  Patient does not currently require vasopressors and is normotensive  #HTN  - on home amlodipine and metoprolol currently holding     #CAD  - on Brilinta (holding) aspirin and ranolazine continue   - as per vascular okay to hold Brilinta for possible pleural effusion thoracentesis  -have not heard back from cardiology regarding Brilinta / last stent 2022 , will hold   - on asa suppository     #Afib  - pt with known history of pAF, first observed 2/1  - will consider starting AC and/or rate control if persists     =====GI=====  #upper GI bleed  - s/p EGD showing dieulafoy lesion and esophagitis likely 2/2 NGT irritation s/p 2 clips  - on protonix IV BID continue   - CT on 1/25 with cholelithiasis and sludge HIDA on 1/26 confirming likely acute choley, s/p IR perc cholecystostomy 1/26   - plan for PEG tube per familys GOC    #Diet  - tube feeds NGT     =====Renal/=====  #Electrolytes  - Maintain K>4, Phos>3, Mag>2, iCal>1  - baseline cr 1.5, at baseline  - on D5 for hyperNa and unable to get free water. Trend BMP/Na     =====Endocrine=====  #DM2  - on NPH 16 units q6 and SSI given solumedrol   - a1c 9.3, glucose wnl inpatient   - hypoglycemic overnight 1/26  started on d10 drips and insulin DC'd  - CTM w/ FSGs, will consider restarting insulin at lower dose  -NPH increasaed to 6u 1/28 due to hyperglycemia      =====Infectious Disease=====  #COVID   - s/p paxlovid and 1 dose remdesivir  # PNA  - CT chest showing ground glass opacities  - s/p zosyn  - intermittent episodes of fevers, likely source GI given choleycystitis? culture NGTD  - ID consult 1/24-  CT maxillofacial CT head wnl  - CT chest with evolving GGO since 1/16  - meropenem 5d course ending 2/1 at night     =====Heme/Onc=====  #DVT Ppx  - heparin sub-q    =====Ethics=====  FULL CODE. 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     PLAN  =====Neurologic=====  #CVA  - prior CVA x3 with no residual deficits  #myoclonic jerks  - on Keppra, valproate per neuro  - holding home  gabapentin and memantine in setting of somnolence  - continue lexapro   - f/u 24 hour EEG  - CT/CTA negative for stroke       =====Pulmonary=====  #COVID  - s/p paxlovid and 1 dose remdesivir while inpatient  #Pleural effusions b/l  - CT chest from 1/16 showing new small bilateral pleural effusions/ atelectasis/ patchy bilateral ground-glass opacities are consistent with pulmonary edema.  - s/p zosyn for aspiration PNA from 1/20- 1/28   - POCUS with resolution of b/l pleural effusions, still holding brilinta for possible reaccumulation requiring pleural tap     - trend blood gas/CO2 as pt on bipap and worsens may need repeat intubation     =====Cardiovascular=====  Patient does not currently require vasopressors and is normotensive  #HTN  - on home amlodipine and metoprolol currently holding     #CAD  - on Brilinta (holding) aspirin and ranolazine continue   - as per vascular okay to hold Brilinta for possible pleural effusion thoracentesis  -have not heard back from cardiology regarding Brilinta / last stent 2022 , will hold   - on asa suppository     #Afib  - pt with known history of pAF, first observed 2/1  - will consider starting AC and/or rate control if persists     =====GI=====  #upper GI bleed  - s/p EGD showing dieulafoy lesion and esophagitis likely 2/2 NGT irritation s/p 2 clips  - on protonix IV BID continue   - CT on 1/25 with cholelithiasis and sludge HIDA on 1/26 confirming likely acute choley, s/p IR perc cholecystostomy 1/26   - plan for PEG tube per familys GO    #Diet  - tube feeds NGT     =====Renal/=====  #Electrolytes  - Maintain K>4, Phos>3, Mag>2, iCal>1  - baseline cr 1.5, at baseline  - on D5 for hyperNa and unable to get free water. Trend BMP/Na     =====Endocrine=====  #DM2  - on NPH 16 units q6 and SSI given solumedrol   - a1c 9.3, glucose wnl inpatient   - hypoglycemic overnight 1/26  started on d10 drips and insulin DC'd  - CTM w/ FSGs, will consider restarting insulin at lower dose  -NPH increasaed to 6u 1/28 due to hyperglycemia      =====Infectious Disease=====  #COVID   - s/p paxlovid and 1 dose remdesivir  # PNA  - CT chest showing ground glass opacities  - s/p zosyn  - intermittent episodes of fevers, likely source GI given choleycystitis? culture NGTD  - ID consult 1/24-  CT maxillofacial CT head wnl  - CT chest with evolving GGO since 1/16  - meropenem 5d course ending 2/1 at night     =====Heme/Onc=====  #DVT Ppx  - heparin sub-q    =====Ethics=====  FULL CODE. 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     PLAN  =====Neurologic=====  #CVA  - prior CVA x3 with no residual deficits  #myoclonic jerks  - on Keppra, valproate per neuro  - holding home  gabapentin and memantine in setting of somnolence  - continue lexapro   - f/u 24 hour EEG  - CT/CTA negative for stroke   - MRI brain when stable       =====Pulmonary=====  #COVID  - s/p paxlovid and 1 dose remdesivir while inpatient  #Pleural effusions b/l  - CT chest from 1/16 showing new small bilateral pleural effusions/ atelectasis/ patchy bilateral ground-glass opacities are consistent with pulmonary edema.  - s/p zosyn for aspiration PNA from 1/20- 1/28   - POCUS with resolution of b/l pleural effusions, still holding brilinta for possible reaccumulation requiring pleural tap   - trend blood gas/CO2 as pt on bipap and worsens may need repeat intubation     =====Cardiovascular=====  Patient does not currently require vasopressors and is normotensive  #HTN  - on home amlodipine and metoprolol currently holding     #CAD  - on Brilinta (holding) aspirin and ranolazine continue   - as per vascular okay to hold Brilinta for possible pleural effusion thoracentesis  -have not heard back from cardiology regarding Brilinta / last stent 2022 , will hold   - on asa suppository     #Afib  - pt with known history of pAF, first observed 2/1  - will consider starting AC and/or rate control if persists     =====GI=====  #upper GI bleed  - s/p EGD showing dieulafoy lesion and esophagitis likely 2/2 NGT irritation s/p 2 clips  - on protonix IV BID continue   - CT on 1/25 with cholelithiasis and sludge HIDA on 1/26 confirming likely acute choley, s/p IR perc cholecystostomy 1/26   - plan for PEG tube per familys GOC once fever free for 48h per GI    #Diet  - tube feeds NGT     =====Renal/=====  #Electrolytes  - Maintain K>4, Phos>3, Mag>2, iCal>1  - baseline cr 1.5, at baseline  - on free water flushes for hyperNa. Trend BMP/Na     =====Endocrine=====  #DM2  - on lantus 15U and q6 SSI  - a1c 9.3, glucose wnl inpatient   - hypoglycemic overnight 1/26  started on d10 drips and insulin DC'd  - CTM w/ FSGs, will consider restarting insulin at lower dose    =====Infectious Disease=====  #COVID   - s/p paxlovid and 1 dose remdesivir  # PNA  - CT chest showing ground glass opacities  - s/p zosyn  - intermittent episodes of fevers, likely source GI given choleycystitis? culture NGTD  - ID consult 1/24-  CT maxillofacial CT head wnl  - CT chest with evolving GGO since 1/16  - meropenem 5d course ending 2/1 at night   - now monitoring off antibiotics    =====Heme/Onc=====  #DVT Ppx  - heparin sub-q    =====Ethics=====  FULL CODE. 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     PLAN  =====Neurologic=====  #CVA  - prior CVA x3 with no residual deficits  #myoclonic jerks  - on Keppra, valproate per neuro    - per neuro: plan to taper off valproate; taper off propofol and onto precedex as needed for clinical sedation  - holding home  gabapentin and memantine in setting of somnolence  - continue lexapro   - f/u 24 hour EEG  - CT/CTA negative for stroke   - MRI brain when stable       =====Pulmonary=====  #COVID  - s/p paxlovid and 1 dose remdesivir while inpatient  #Pleural effusions b/l  - CT chest from 1/16 showing new small bilateral pleural effusions/ atelectasis/ patchy bilateral ground-glass opacities are consistent with pulmonary edema.  - s/p zosyn for aspiration PNA from 1/20- 1/28   - POCUS with resolution of b/l pleural effusions, still holding brilinta for possible reaccumulation requiring pleural tap   - trend blood gas/CO2 as pt on bipap and worsens may need repeat intubation     =====Cardiovascular=====  Patient does not currently require vasopressors and is normotensive  #HTN  - on home amlodipine and metoprolol currently holding     #CAD  - on Brilinta (holding) aspirin and ranolazine continue   - as per vascular okay to hold Brilinta for possible pleural effusion thoracentesis  -have not heard back from cardiology regarding Brilinta / last stent 2022 , will hold   - on asa suppository     #Afib  - pt with known history of pAF, first observed 2/1  - will consider starting AC and/or rate control if persists     =====GI=====  #upper GI bleed  - s/p EGD showing dieulafoy lesion and esophagitis likely 2/2 NGT irritation s/p 2 clips  - on protonix IV BID continue   - CT on 1/25 with cholelithiasis and sludge HIDA on 1/26 confirming likely acute choley, s/p IR perc cholecystostomy 1/26   - plan for PEG tube per familys GOC once fever free for 48h per GI    #Diet  - tube feeds NGT     =====Renal/=====  #Electrolytes  - Maintain K>4, Phos>3, Mag>2, iCal>1  - baseline cr 1.5, at baseline  - on free water flushes for hyperNa. Trend BMP/Na     =====Endocrine=====  #DM2  - on lantus 15U and q6 SSI  - a1c 9.3, glucose wnl inpatient   - hypoglycemic overnight 1/26  started on d10 drips and insulin DC'd  - CTM w/ FSGs, will consider restarting insulin at lower dose    =====Infectious Disease=====  #COVID   - s/p paxlovid and 1 dose remdesivir  # PNA  - CT chest showing ground glass opacities  - s/p zosyn  - intermittent episodes of fevers, likely source GI given choleycystitis? culture NGTD  - ID consult 1/24-  CT maxillofacial CT head wnl  - CT chest with evolving GGO since 1/16  - meropenem 5d course ending 2/1 at night   - now monitoring off antibiotics    =====Heme/Onc=====  #DVT Ppx  - heparin sub-q    =====Ethics=====  FULL CODE.

## 2024-02-04 LAB
ALBUMIN SERPL ELPH-MCNC: 2.2 G/DL — LOW (ref 3.3–5)
ALP SERPL-CCNC: 110 U/L — SIGNIFICANT CHANGE UP (ref 40–120)
ALT FLD-CCNC: 37 U/L — SIGNIFICANT CHANGE UP (ref 4–41)
ANION GAP SERPL CALC-SCNC: 10 MMOL/L — SIGNIFICANT CHANGE UP (ref 7–14)
ANION GAP SERPL CALC-SCNC: 10 MMOL/L — SIGNIFICANT CHANGE UP (ref 7–14)
APTT BLD: 31 SEC — SIGNIFICANT CHANGE UP (ref 24.5–35.6)
AST SERPL-CCNC: 29 U/L — SIGNIFICANT CHANGE UP (ref 4–40)
BILIRUB SERPL-MCNC: 0.2 MG/DL — SIGNIFICANT CHANGE UP (ref 0.2–1.2)
BLD GP AB SCN SERPL QL: NEGATIVE — SIGNIFICANT CHANGE UP
BLOOD GAS ARTERIAL - LYTES,HGB,ICA,LACT RESULT: SIGNIFICANT CHANGE UP
BLOOD GAS ARTERIAL COMPREHENSIVE RESULT: SIGNIFICANT CHANGE UP
BUN SERPL-MCNC: 32 MG/DL — HIGH (ref 7–23)
BUN SERPL-MCNC: 33 MG/DL — HIGH (ref 7–23)
CALCIUM SERPL-MCNC: 7.8 MG/DL — LOW (ref 8.4–10.5)
CALCIUM SERPL-MCNC: 8 MG/DL — LOW (ref 8.4–10.5)
CHLORIDE SERPL-SCNC: 103 MMOL/L — SIGNIFICANT CHANGE UP (ref 98–107)
CHLORIDE SERPL-SCNC: 99 MMOL/L — SIGNIFICANT CHANGE UP (ref 98–107)
CO2 SERPL-SCNC: 30 MMOL/L — SIGNIFICANT CHANGE UP (ref 22–31)
CO2 SERPL-SCNC: 31 MMOL/L — SIGNIFICANT CHANGE UP (ref 22–31)
CREAT SERPL-MCNC: 1.75 MG/DL — HIGH (ref 0.5–1.3)
CREAT SERPL-MCNC: 1.87 MG/DL — HIGH (ref 0.5–1.3)
EGFR: 34 ML/MIN/1.73M2 — LOW
EGFR: 37 ML/MIN/1.73M2 — LOW
GLUCOSE BLDC GLUCOMTR-MCNC: 148 MG/DL — HIGH (ref 70–99)
GLUCOSE BLDC GLUCOMTR-MCNC: 187 MG/DL — HIGH (ref 70–99)
GLUCOSE BLDC GLUCOMTR-MCNC: 229 MG/DL — HIGH (ref 70–99)
GLUCOSE BLDC GLUCOMTR-MCNC: 232 MG/DL — HIGH (ref 70–99)
GLUCOSE SERPL-MCNC: 148 MG/DL — HIGH (ref 70–99)
GLUCOSE SERPL-MCNC: 241 MG/DL — HIGH (ref 70–99)
HCT VFR BLD CALC: 24 % — LOW (ref 39–50)
HGB BLD-MCNC: 7.4 G/DL — LOW (ref 13–17)
INR BLD: 1.12 RATIO — SIGNIFICANT CHANGE UP (ref 0.85–1.18)
MAGNESIUM SERPL-MCNC: 2.1 MG/DL — SIGNIFICANT CHANGE UP (ref 1.6–2.6)
MCHC RBC-ENTMCNC: 29.4 PG — SIGNIFICANT CHANGE UP (ref 27–34)
MCHC RBC-ENTMCNC: 30.8 GM/DL — LOW (ref 32–36)
MCV RBC AUTO: 95.2 FL — SIGNIFICANT CHANGE UP (ref 80–100)
NRBC # BLD: 0 /100 WBCS — SIGNIFICANT CHANGE UP (ref 0–0)
NRBC # FLD: 0 K/UL — SIGNIFICANT CHANGE UP (ref 0–0)
PHOSPHATE SERPL-MCNC: 3.4 MG/DL — SIGNIFICANT CHANGE UP (ref 2.5–4.5)
PLATELET # BLD AUTO: 369 K/UL — SIGNIFICANT CHANGE UP (ref 150–400)
POTASSIUM SERPL-MCNC: 3.6 MMOL/L — SIGNIFICANT CHANGE UP (ref 3.5–5.3)
POTASSIUM SERPL-MCNC: 3.7 MMOL/L — SIGNIFICANT CHANGE UP (ref 3.5–5.3)
POTASSIUM SERPL-SCNC: 3.6 MMOL/L — SIGNIFICANT CHANGE UP (ref 3.5–5.3)
POTASSIUM SERPL-SCNC: 3.7 MMOL/L — SIGNIFICANT CHANGE UP (ref 3.5–5.3)
PROT SERPL-MCNC: 6 G/DL — SIGNIFICANT CHANGE UP (ref 6–8.3)
PROTHROM AB SERPL-ACNC: 12.6 SEC — SIGNIFICANT CHANGE UP (ref 9.5–13)
RBC # BLD: 2.52 M/UL — LOW (ref 4.2–5.8)
RBC # FLD: 17.9 % — HIGH (ref 10.3–14.5)
RH IG SCN BLD-IMP: POSITIVE — SIGNIFICANT CHANGE UP
SODIUM SERPL-SCNC: 139 MMOL/L — SIGNIFICANT CHANGE UP (ref 135–145)
SODIUM SERPL-SCNC: 144 MMOL/L — SIGNIFICANT CHANGE UP (ref 135–145)
VALPROATE SERPL-MCNC: 27.1 UG/ML — LOW (ref 50–100)
WBC # BLD: 7.13 K/UL — SIGNIFICANT CHANGE UP (ref 3.8–10.5)
WBC # FLD AUTO: 7.13 K/UL — SIGNIFICANT CHANGE UP (ref 3.8–10.5)

## 2024-02-04 PROCEDURE — 99291 CRITICAL CARE FIRST HOUR: CPT

## 2024-02-04 PROCEDURE — 95720 EEG PHY/QHP EA INCR W/VEEG: CPT

## 2024-02-04 RX ORDER — ACETAZOLAMIDE 250 MG/1
500 TABLET ORAL ONCE
Refills: 0 | Status: COMPLETED | OUTPATIENT
Start: 2024-02-04 | End: 2024-02-04

## 2024-02-04 RX ADMIN — Medication 3 MILLILITER(S): at 21:12

## 2024-02-04 RX ADMIN — Medication 3.72 MICROGRAM(S)/KG/MIN: at 07:55

## 2024-02-04 RX ADMIN — LEVETIRACETAM 400 MILLIGRAM(S): 250 TABLET, FILM COATED ORAL at 06:24

## 2024-02-04 RX ADMIN — Medication 56 MILLIGRAM(S): at 06:25

## 2024-02-04 RX ADMIN — Medication 1 APPLICATION(S): at 22:09

## 2024-02-04 RX ADMIN — DEXMEDETOMIDINE HYDROCHLORIDE IN 0.9% SODIUM CHLORIDE 3.97 MICROGRAM(S)/KG/HR: 4 INJECTION INTRAVENOUS at 14:32

## 2024-02-04 RX ADMIN — DEXMEDETOMIDINE HYDROCHLORIDE IN 0.9% SODIUM CHLORIDE 3.97 MICROGRAM(S)/KG/HR: 4 INJECTION INTRAVENOUS at 07:55

## 2024-02-04 RX ADMIN — BUMETANIDE 2 MILLIGRAM(S): 0.25 INJECTION INTRAMUSCULAR; INTRAVENOUS at 06:56

## 2024-02-04 RX ADMIN — PANTOPRAZOLE SODIUM 40 MILLIGRAM(S): 20 TABLET, DELAYED RELEASE ORAL at 06:25

## 2024-02-04 RX ADMIN — ESCITALOPRAM OXALATE 10 MILLIGRAM(S): 10 TABLET, FILM COATED ORAL at 11:32

## 2024-02-04 RX ADMIN — SODIUM CHLORIDE 4 MILLILITER(S): 9 INJECTION INTRAMUSCULAR; INTRAVENOUS; SUBCUTANEOUS at 21:16

## 2024-02-04 RX ADMIN — PANTOPRAZOLE SODIUM 40 MILLIGRAM(S): 20 TABLET, DELAYED RELEASE ORAL at 18:14

## 2024-02-04 RX ADMIN — CHLORHEXIDINE GLUCONATE 15 MILLILITER(S): 213 SOLUTION TOPICAL at 18:08

## 2024-02-04 RX ADMIN — Medication 300 MILLIGRAM(S): at 11:32

## 2024-02-04 RX ADMIN — Medication 4: at 11:42

## 2024-02-04 RX ADMIN — Medication 3 MILLILITER(S): at 10:26

## 2024-02-04 RX ADMIN — SODIUM CHLORIDE 4 MILLILITER(S): 9 INJECTION INTRAMUSCULAR; INTRAVENOUS; SUBCUTANEOUS at 03:30

## 2024-02-04 RX ADMIN — DEXMEDETOMIDINE HYDROCHLORIDE IN 0.9% SODIUM CHLORIDE 3.97 MICROGRAM(S)/KG/HR: 4 INJECTION INTRAVENOUS at 20:19

## 2024-02-04 RX ADMIN — Medication 3 MILLILITER(S): at 15:25

## 2024-02-04 RX ADMIN — LIDOCAINE 1 PATCH: 4 CREAM TOPICAL at 08:28

## 2024-02-04 RX ADMIN — Medication 3 MILLILITER(S): at 03:26

## 2024-02-04 RX ADMIN — CHLORHEXIDINE GLUCONATE 15 MILLILITER(S): 213 SOLUTION TOPICAL at 06:25

## 2024-02-04 RX ADMIN — Medication 3.72 MICROGRAM(S)/KG/MIN: at 20:20

## 2024-02-04 RX ADMIN — Medication 1 DROP(S): at 22:58

## 2024-02-04 RX ADMIN — Medication 1 DROP(S): at 18:13

## 2024-02-04 RX ADMIN — Medication 1 GRAM(S): at 18:14

## 2024-02-04 RX ADMIN — ACETAZOLAMIDE 500 MILLIGRAM(S): 250 TABLET ORAL at 14:32

## 2024-02-04 RX ADMIN — SODIUM CHLORIDE 4 MILLILITER(S): 9 INJECTION INTRAMUSCULAR; INTRAVENOUS; SUBCUTANEOUS at 10:26

## 2024-02-04 RX ADMIN — INSULIN GLARGINE 15 UNIT(S): 100 INJECTION, SOLUTION SUBCUTANEOUS at 11:42

## 2024-02-04 RX ADMIN — HEPARIN SODIUM 5000 UNIT(S): 5000 INJECTION INTRAVENOUS; SUBCUTANEOUS at 06:26

## 2024-02-04 RX ADMIN — HEPARIN SODIUM 5000 UNIT(S): 5000 INJECTION INTRAVENOUS; SUBCUTANEOUS at 22:57

## 2024-02-04 RX ADMIN — Medication 56 MILLIGRAM(S): at 00:27

## 2024-02-04 RX ADMIN — Medication 56 MILLIGRAM(S): at 12:34

## 2024-02-04 RX ADMIN — Medication 4: at 18:08

## 2024-02-04 RX ADMIN — Medication 1 DROP(S): at 06:26

## 2024-02-04 RX ADMIN — Medication 1 DROP(S): at 11:32

## 2024-02-04 RX ADMIN — CHLORHEXIDINE GLUCONATE 1 APPLICATION(S): 213 SOLUTION TOPICAL at 11:42

## 2024-02-04 RX ADMIN — LEVETIRACETAM 400 MILLIGRAM(S): 250 TABLET, FILM COATED ORAL at 18:15

## 2024-02-04 RX ADMIN — Medication 2: at 06:26

## 2024-02-04 RX ADMIN — SODIUM CHLORIDE 4 MILLILITER(S): 9 INJECTION INTRAMUSCULAR; INTRAVENOUS; SUBCUTANEOUS at 15:25

## 2024-02-04 RX ADMIN — Medication 1 GRAM(S): at 06:25

## 2024-02-04 RX ADMIN — LIDOCAINE 1 PATCH: 4 CREAM TOPICAL at 22:09

## 2024-02-04 RX ADMIN — HEPARIN SODIUM 5000 UNIT(S): 5000 INJECTION INTRAVENOUS; SUBCUTANEOUS at 14:31

## 2024-02-04 NOTE — PROGRESS NOTE ADULT - ASSESSMENT
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     PLAN  =====Neurologic=====  #CVA  - prior CVA x3 with no residual deficits  #myoclonic jerks  - on Keppra, valproate per neuro    - per neuro: plan to taper off valproate; taper off propofol and onto precedex as needed for clinical sedation  - holding home  gabapentin and memantine in setting of somnolence  - continue lexapro   - f/u 24 hour EEG  - CT/CTA negative for stroke   - MRI brain when stable       =====Pulmonary=====  #COVID  - s/p paxlovid and 1 dose remdesivir while inpatient  #Pleural effusions b/l  - CT chest from 1/16 showing new small bilateral pleural effusions/ atelectasis/ patchy bilateral ground-glass opacities are consistent with pulmonary edema.  - s/p zosyn for aspiration PNA from 1/20- 1/28   - POCUS with resolution of b/l pleural effusions, still holding brilinta for possible reaccumulation requiring pleural tap   - trend blood gas/CO2 as pt on bipap and worsens may need repeat intubation     =====Cardiovascular=====  Patient does not currently require vasopressors and is normotensive  #HTN  - on home amlodipine and metoprolol currently holding     #CAD  - on Brilinta (holding) aspirin and ranolazine continue   - as per vascular okay to hold Brilinta for possible pleural effusion thoracentesis  -have not heard back from cardiology regarding Brilinta / last stent 2022 , will hold   - on asa suppository     #Afib  - pt with known history of pAF, first observed 2/1  - will consider starting AC and/or rate control if persists     =====GI=====  #upper GI bleed  - s/p EGD showing dieulafoy lesion and esophagitis likely 2/2 NGT irritation s/p 2 clips  - on protonix IV BID continue   - CT on 1/25 with cholelithiasis and sludge HIDA on 1/26 confirming likely acute choley, s/p IR perc cholecystostomy 1/26   - plan for PEG tube per familys GOC once fever free for 48h per GI    #Diet  - tube feeds NGT     =====Renal/=====  #Electrolytes  - Maintain K>4, Phos>3, Mag>2, iCal>1  - baseline cr 1.5, at baseline  - on free water flushes for hyperNa. Trend BMP/Na     =====Endocrine=====  #DM2  - on lantus 15U and q6 SSI  - a1c 9.3, glucose wnl inpatient   - hypoglycemic overnight 1/26  started on d10 drips and insulin DC'd  - CTM w/ FSGs, will consider restarting insulin at lower dose    =====Infectious Disease=====  #COVID   - s/p paxlovid and 1 dose remdesivir  # PNA  - CT chest showing ground glass opacities  - s/p zosyn  - intermittent episodes of fevers, likely source GI given choleycystitis? culture NGTD  - ID consult 1/24-  CT maxillofacial CT head wnl  - CT chest with evolving GGO since 1/16  - meropenem 5d course ending 2/1 at night   - now monitoring off antibiotics    =====Heme/Onc=====  #DVT Ppx  - heparin sub-q    =====Ethics=====  FULL CODE. 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     PLAN  =====Neurologic=====  #CVA  - prior CVA x3 with no residual deficits  #myoclonic jerks  - on Keppra, but now off valproate per neuro    - also per neuro, use precedex as needed for clinical sedation as opposed to propofol   - holding home  gabapentin and memantine in setting of somnolence  - continue lexapro   - f/u 24 hour EEG  - CT/CTA negative for stroke   - MRI brain when stable and off EEG      =====Pulmonary=====  #COVID  - s/p paxlovid and 1 dose remdesivir while inpatient  #Pleural effusions b/l  - CT chest from 1/16 showing new small bilateral pleural effusions/ atelectasis/ patchy bilateral ground-glass opacities are consistent with pulmonary edema.  - s/p zosyn for aspiration PNA from 1/20- 1/28   - POCUS with resolution of b/l pleural effusions, still holding brilinta for possible reaccumulation requiring pleural tap and/or other procedure ie trach/PEG.       =====Cardiovascular=====  Patient does not currently require vasopressors and is normotensive  #HTN  - on home amlodipine and metoprolol currently holding     #CAD  - on Brilinta (holding) aspirin and ranolazine continue   - as per vascular okay to hold Brilinta for possible pleural effusion thoracentesis  -have not heard back from cardiology regarding Brilinta / last stent 2022 , will hold   - on asa suppository     #Afib  - pt with known history of pAF, first observed 2/1  - can consider starting AC and/or rate control if persists     =====GI=====  #upper GI bleed  - s/p EGD showing dieulafoy lesion and esophagitis likely 2/2 NGT irritation s/p 2 clips  - on protonix IV BID continue   - CT on 1/25 with cholelithiasis and sludge HIDA on 1/26 confirming likely acute choley, s/p IR perc cholecystostomy 1/26   - plan for PEG tube per familys GOC once fever free for 48h per GI    #Diet  - tube feeds NGT     =====Renal/=====  #Electrolytes  - Maintain K>4, Phos>3, Mag>2, iCal>1  - baseline cr 1.5, at baseline  - on free water flushes for hyperNa. Trend BMP/Na     =====Endocrine=====  #DM2  - on lantus 15U and q6 SSI  - a1c 9.3, glucose wnl inpatient       =====Infectious Disease=====  #COVID   - s/p paxlovid and 1 dose remdesivir  # PNA  - CT chest showing ground glass opacities  - s/p zosyn  - intermittent episodes of fevers, likely source GI given choleycystitis? culture NGTD  - meropenem 5d course thru 2/1   - now monitoring off antibiotics    =====Heme/Onc=====  #DVT Ppx  - heparin sub-q    =====Ethics=====  FULL CODE.

## 2024-02-04 NOTE — PROGRESS NOTE ADULT - SUBJECTIVE AND OBJECTIVE BOX
Aashish Boyer MD  Interventional Cardiology / Endovascular Specialist  [place] Office : [phone] [place] [address] [place] [address]  Tel:   [place] Office : [phone] [facility] [place] [address]  Tel: [phone]    Subjective/Overnight events: Patient lying in bed remains in MICU, on bipap   	  MEDICATIONS:  heparin   Injectable 5000 Unit(s) SubCutaneous every 8 hours  norepinephrine Infusion 0.05 MICROgram(s)/kG/Min IV Continuous <Continuous>      albuterol/ipratropium for Nebulization 3 milliLiter(s) Nebulizer every 6 hours  sodium chloride 3%  Inhalation 4 milliLiter(s) Inhalation every 6 hours    aspirin Suppository 300 milliGRAM(s) Rectal daily  dexMEDEtomidine Infusion 0.2 MICROgram(s)/kG/Hr IV Continuous <Continuous>  escitalopram 10 milliGRAM(s) Oral daily  levETIRAcetam  IVPB 500 milliGRAM(s) IV Intermittent every 12 hours    pantoprazole  Injectable 40 milliGRAM(s) IV Push every 12 hours  sucralfate suspension 1 Gram(s) Enteral Tube two times a day    dextrose 50% Injectable 25 Gram(s) IV Push once  dextrose 50% Injectable 25 Gram(s) IV Push once  dextrose 50% Injectable 12.5 Gram(s) IV Push once  dextrose Oral Gel 15 Gram(s) Oral once PRN  glucagon  Injectable 1 milliGRAM(s) IntraMuscular once  insulin glargine Injectable (LANTUS) 15 Unit(s) SubCutaneous <User Schedule>  insulin lispro (ADMELOG) corrective regimen sliding scale   SubCutaneous every 6 hours    artificial  tears Solution 1 Drop(s) Left EYE four times a day  chlorhexidine 0.12% Liquid 15 milliLiter(s) Oral Mucosa every 12 hours  chlorhexidine 2% Cloths 1 Application(s) Topical daily  dextrose 5%. 1000 milliLiter(s) IV Continuous <Continuous>  dextrose 5%. 1000 milliLiter(s) IV Continuous <Continuous>  lidocaine   4% Patch 1 Patch Transdermal every 24 hours  petrolatum Ophthalmic Ointment 1 Application(s) Left EYE at bedtime      PAST MEDICAL/SURGICAL HISTORY  PAST MEDICAL & SURGICAL HISTORY:  Hyperlipemia      Hypertension      Coronary Artery Disease      Diabetes Mellitus Type II      Stented Coronary Artery  total 5 stents, last stent 5/2019      Neuropathy      Myocardial infarction      Stroke  mild left facial numbness   no other residuals verbalized      Myoclonic jerking      Stage 3 chronic kidney disease      History of Cataract Extraction      Hx of CABG      H/O coronary angiogram      S/P coronary artery stent placement  1/6/09      S/P placement of cardiac pacemaker          SOCIAL HISTORY: Substance Use (street drugs): ( x ) never used  (  ) other:    FAMILY HISTORY:  No pertinent family history in first degree relatives      PHYSICAL EXAM:  T(C): 37.1 (02-04-24 @ 20:00), Max: 37.2 (02-04-24 @ 08:00)  HR: 90 (02-04-24 @ 23:21) (81 - 92)  BP: 121/50 (02-04-24 @ 18:00) (105/46 - 145/60)  RR: 15 (02-04-24 @ 18:00) (12 - 22)  SpO2: 100% (02-04-24 @ 23:21) (100% - 100%)  Wt(kg): --  I&O's Summary    03 Feb 2024 07:01  -  04 Feb 2024 07:00  --------------------------------------------------------  IN: 3227.8 mL / OUT: 4555 mL / NET: -1327.2 mL    04 Feb 2024 07:01  -  04 Feb 2024 23:38  --------------------------------------------------------  IN: 1524.8 mL / OUT: 4050 mL / NET: -2525.2 mL      GENERAL: s/p extubation   EYES:  conjunctiva and sclera clear  ENMT: No tonsillar erythema, exudates, or enlargement  Cardiovascular: Normal S1 S2, No JVD, systolic murmur +  Respiratory: b/l ronchi	  Gastrointestinal:  Soft,   Extremities: No edema                           7.4    7.13  )-----------( 369      ( 04 Feb 2024 02:46 )             24.0     02-04    139  |  99  |  33<H>  ----------------------------<  241<H>  3.6   |  30  |  1.75<H>    Ca    7.8<L>      04 Feb 2024 12:57  Phos  3.4     02-04  Mg     2.10     02-04    TPro  6.0  /  Alb  2.2<L>  /  TBili  0.2  /  DBili  x   /  AST  29  /  ALT  37  /  AlkPhos  110  02-04    proBNP:   Lipid Profile:   HgA1c:   TSH:     Consultant(s) Notes Reviewed:  [x ] YES  [ ] NO    Care Discussed with Consultants/Other Providers [ x] YES  [ ] NO    Imaging Personally Reviewed independently:  [x] YES  [ ] NO    All labs, radiologic studies, vitals, orders and medications list reviewed. Patient is seen and examined at bedside. Case discussed with medical team.

## 2024-02-04 NOTE — PROGRESS NOTE ADULT - ATTENDING COMMENTS
89 year old male with CAD s/p CABG s/p stents (last 5/2022), s/p PPM, HTN, HLD, T2DM, CKD, PVD, prior CA x3 (without residual deficits), and Myoclonic Jerks (on Keppra) admitted to MICU for acute respiratory failure, worsening s/p bronchoscopy 1/19 requiring intubation (1/22) acute cholecystitis s/p perc asa 1/26 by IR, extubated (1/30) but reintubated (2/2)   Follow up EEG over last 24 hours. Need eventual MRI.  Follow up neurology recommendations.  Remains with minimal oxygen requirements but concerned for ability to protect airway post-extubation.  Family is leaning towards trach.  Alkalemic on blood gas this AM.  Hold diuresis.   Check ABG.

## 2024-02-04 NOTE — PROGRESS NOTE ADULT - ASSESSMENT
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     EKG SR RBBB (old per family)     1) Hypoxia   - s/p bronch   - intubated   - Rt pleural effusion   - hold lasix given Hypernatremia and worsening creat  - intubated again , AMS , on EEG   - f/u neuro recs     2) CAD s/p CABG  - p/w chest pain. EKG non ischemic , trop -sera   - echo with normal lv and moderate AS   - c/w metoprolol   - chest pains  Trop mildly elevated and trending down likely sec to kidney disease,   - holding brilinta, was on it for SMA stent placed in 12/2023, held 2/2 anticipation for PEG placement, restart when no further invasive procedures planned    3) Covid +  - s/p remdesivir   - c/w supportive management   - t/t per primary team     4) Abd distension   -CTA AP obtained which showed  partially occlusive calcified and non-calcified plaque just distal to take-off of the SMA, with estimated at least 75% luminal narrowing. Limited evaluation of its distal branches. 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   -on Protonix IV q 12

## 2024-02-04 NOTE — PROGRESS NOTE ADULT - ASSESSMENT
on bipap  nad  afebrile      albuterol/ipratropium for Nebulization 3 milliLiter(s) Nebulizer every 6 hours  artificial  tears Solution 1 Drop(s) Left EYE four times a day  aspirin Suppository 300 milliGRAM(s) Rectal daily  buMETAnide Injectable 2 milliGRAM(s) IV Push every 12 hours  chlorhexidine 0.12% Liquid 15 milliLiter(s) Oral Mucosa every 12 hours  chlorhexidine 2% Cloths 1 Application(s) Topical daily  dexMEDEtomidine Infusion 0.2 MICROgram(s)/kG/Hr IV Continuous <Continuous>  dextrose 5%. 1000 milliLiter(s) IV Continuous <Continuous>  dextrose 5%. 1000 milliLiter(s) IV Continuous <Continuous>  dextrose 50% Injectable 25 Gram(s) IV Push once  dextrose 50% Injectable 25 Gram(s) IV Push once  dextrose 50% Injectable 12.5 Gram(s) IV Push once  dextrose Oral Gel 15 Gram(s) Oral once PRN  escitalopram 10 milliGRAM(s) Oral daily  glucagon  Injectable 1 milliGRAM(s) IntraMuscular once  heparin   Injectable 5000 Unit(s) SubCutaneous every 8 hours  insulin glargine Injectable (LANTUS) 15 Unit(s) SubCutaneous <User Schedule>  insulin lispro (ADMELOG) corrective regimen sliding scale   SubCutaneous every 6 hours  levETIRAcetam  IVPB 500 milliGRAM(s) IV Intermittent every 12 hours  lidocaine   4% Patch 1 Patch Transdermal every 24 hours  norepinephrine Infusion 0.05 MICROgram(s)/kG/Min IV Continuous <Continuous>  pantoprazole  Injectable 40 milliGRAM(s) IV Push every 12 hours  petrolatum Ophthalmic Ointment 1 Application(s) Left EYE at bedtime  propofol Infusion 30.055 MICROgram(s)/kG/Min IV Continuous <Continuous>  sodium chloride 3%  Inhalation 4 milliLiter(s) Inhalation every 6 hours  sucralfate suspension 1 Gram(s) Enteral Tube two times a day  valproate sodium  IVPB 600 milliGRAM(s) IV Intermittent every 6 hours      VITAL:  T(C): , Max: 37.2 (02-04-24 @ 08:00)  T(F): , Max: 98.9 (02-04-24 @ 08:00)  HR: 88 (02-04-24 @ 08:00)  BP: 138/60 (02-04-24 @ 08:00)  BP(mean): 79 (02-04-24 @ 08:00)  RR: 14 (02-04-24 @ 08:00)  SpO2: 100% (02-04-24 @ 08:00)  Wt(kg): --    02-03-24 @ 07:01  -  02-04-24 @ 07:00  --------------------------------------------------------  IN: 3227.8 mL / OUT: 4555 mL / NET: -1327.2 mL    02-04-24 @ 07:01  -  02-04-24 @ 08:30  --------------------------------------------------------  IN: 110.8 mL / OUT: 400 mL / NET: -289.2 mL        PHYSICAL EXAM:  Constitutional: no distress   HEENT: on bipap  Neck: No LAD, No JVD  Respiratory: diminished b/l  Cardiovascular: S1 and S2  Gastrointestinal: BS+, soft, NT/ND  Extremities: + l/e edema  Neurological: UTO  : + Moss  Skin: No rashes     LABS:                          7.4    7.13  )-----------( 369      ( 04 Feb 2024 02:46 )             24.0     Na(144)/K(3.7)/Cl(103)/HCO3(31)/BUN(32)/Cr(1.87)Glu(148)/Ca(8.0)/Mg(2.10)/PO4(3.4)    02-04 @ 02:46  Na(147)/K(3.7)/Cl(107)/HCO3(30)/BUN(35)/Cr(1.84)Glu(223)/Ca(8.1)/Mg(2.10)/PO4(2.7)    02-03 @ 10:40  Na(151)/K(3.2)/Cl(110)/HCO3(30)/BUN(35)/Cr(1.89)Glu(94)/Ca(8.2)/Mg(2.30)/PO4(2.2)    02-03 @ 04:24  Na(146)/K(4.0)/Cl(108)/HCO3(25)/BUN(37)/Cr(1.86)Glu(215)/Ca(8.3)/Mg(2.50)/PO4(3.7)    02-02 @ 18:21  Na(148)/K(4.0)/Cl(108)/HCO3(30)/BUN(39)/Cr(1.87)Glu(327)/Ca(8.4)/Mg(--)/PO4(--)    02-02 @ 11:59  Na(148)/K(4.2)/Cl(108)/HCO3(29)/BUN(39)/Cr(1.95)Glu(320)/Ca(8.5)/Mg(2.60)/PO4(4.2)    02-02 @ 06:19  Na(151)/K(4.2)/Cl(110)/HCO3(27)/BUN(39)/Cr(1.88)Glu(258)/Ca(8.3)/Mg(2.50)/PO4(4.5)    02-01 @ 21:34  Na(153)/K(4.5)/Cl(111)/HCO3(27)/BUN(40)/Cr(1.87)Glu(216)/Ca(8.4)/Mg(--)/PO4(--)    02-01 @ 16:29  Na(152)/K(4.7)/Cl(111)/HCO3(28)/BUN(42)/Cr(1.87)Glu(215)/Ca(8.5)/Mg(2.60)/PO4(4.7)    02-01 @ 12:05    Urinalysis Basic - ( 04 Feb 2024 02:46 )    Color: x / Appearance: x / SG: x / pH: x  Gluc: 148 mg/dL / Ketone: x  / Bili: x / Urobili: x   Blood: x / Protein: x / Nitrite: x   Leuk Esterase: x / RBC: x / WBC x   Sq Epi: x / Non Sq Epi: x / Bacteria: x            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  Can give another  dose potassium chloride 40mEq x 1 dose  to keep K+ >4  Hypertensive urgency -resolved -BP acceptable at present  Pulm - resp failure - on bipap    s/p EEG  today - eval noted  Hypernatremia - Na+ much improved  on free water  as prescribed via NGT    1 Renal - scr stable at present .  no objection to lasix 40mg IV once PRN, in case of distress (last given 1/30/24)   continue to trend phos level daily   4 CV - BP acceptable at present  5 Vasc - s/p SMA stent placement;   6 Sx - s/p HIDA + for acute asa, medical management  7 GI - s/p EGD clipping of bleeding duodenal ulcers   8 Anemia- hgb improving, continue to trend H/H; suggest PRBC for Hgb <7.0; transfuse per primary team   9 Pulm -on bipap  10- ID -afebrile   11 Glycemic control as able           Larry Cohen NP-BC  [employer]  [phone]

## 2024-02-04 NOTE — EEG REPORT - NS EEG TEXT BOX
SHAIK DONOHUE [facility]-[id number]     Study Date: 2/3/24 0800 - 2/4/24 0800  Duration: 24 hr  --------------------------------------------------------------------------------------------------  History:  CC/ HPI Patient is a 89y old  Male who presents with a chief complaint of COVID19, Chest pain (02 Feb 2024 07:32)    MEDICATIONS  (STANDING):  albuterol/ipratropium for Nebulization 3 milliLiter(s) Nebulizer every 6 hours  artificial  tears Solution 1 Drop(s) Left EYE four times a day  aspirin Suppository 300 milliGRAM(s) Rectal daily  chlorhexidine 2% Cloths 1 Application(s) Topical daily  dextrose 5%. 1000 milliLiter(s) (100 mL/Hr) IV Continuous <Continuous>  dextrose 50% Injectable 25 Gram(s) IV Push once  dextrose 50% Injectable 25 Gram(s) IV Push once  dextrose 50% Injectable 12.5 Gram(s) IV Push once  dextrose Oral Gel 15 Gram(s) Oral once  escitalopram 10 milliGRAM(s) Oral daily  glucagon  Injectable 1 milliGRAM(s) IntraMuscular once  insulin lispro (ADMELOG) corrective regimen sliding scale   SubCutaneous every 6 hours  levETIRAcetam  IVPB 500 milliGRAM(s) IV Intermittent every 12 hours  lidocaine   4% Patch 1 Patch Transdermal every 24 hours  pantoprazole  Injectable 40 milliGRAM(s) IV Push every 12 hours  petrolatum Ophthalmic Ointment 1 Application(s) Left EYE at bedtime  sodium chloride 3%  Inhalation 4 milliLiter(s) Inhalation every 6 hours  sucralfate suspension 1 Gram(s) Enteral Tube two times a day  valproate sodium  IVPB 500 milliGRAM(s) IV Intermittent every 8 hours    --------------------------------------------------------------------------------------------------  Study Interpretation:    [[[Abbreviation Key:  PDR=alpha rhythm/posterior dominant rhythm. A-P=anterior posterior.  Amplitude: ‘very low’:<20; ‘low’:20-49; ‘medium’:; ‘high’:>150uV.  Persistence for periodic/rhythmic patterns (% of epoch) ‘rare’:<1%; ‘occasional’:1-10%; ‘frequent’:10-50%; ‘abundant’:50-90%; ‘continuous’:>90%.  Persistence for sporadic discharges: ‘rare’:<1/hr; ‘occasional’:1/min-1/hr; ‘frequent’:>1/min; ‘abundant’:>1/10 sec.  RPP=rhythmic and periodic patterns; GRDA=generalized rhythmic delta activity; FIRDA=frontal intermittent GRDA; LRDA=lateralized rhythmic delta activity; TIRDA=temporal intermittent rhythmic delta activity;  LPD=PLED=lateralized periodic discharges; GPD=generalized periodic discharges; BIPDs =bilateral independent periodic discharges; Mf=multifocal; SIRPDs=stimulus induced rhythmic, periodic, or ictal appearing discharges; BIRDs=brief potentially ictal rhythmic discharges >4 Hz, lasting .5-10s; PFA (paroxysmal bursts >13 Hz or =8 Hz <10s).  Modifiers: +F=with fast component; +S=with spike component; +R=with rhythmic component.  S-B=burst suppression pattern.  Max=maximal. N1-drowsy; N2-stage II sleep; N3-slow wave sleep. SSS/BETS=small sharp spikes/benign epileptiform transients of sleep. HV=hyperventilation; PS=photic stimulation]]]    Daily EEG Visual Analysis    FINDINGS:      Background:  The background is discontinuous and symmetric.  The predominant background consists of diffuse polymorphic delta slowing, intermittent theta. There is no posterior dominant rhythm.     Background Slowing:  Generalized slowing: As above  Focal slowing: None    State Changes:   Absent    Interictal Findings:  Intermittent periods of generalized periodic discharges, with triphasic morphology, at ~1 Hz.     Electrographic and Electroclinical seizures:  No clear seizures observed.    Other Clinical Events:  No press button events.    Activation Procedures:   Hyperventilation is not performed.    Photic stimulation is not performed.    Artifacts:  Intermittent myogenic and movement artifacts are present.    EKG:  Single-lead EKG shows in termittent irregularly irregular rhythm and tachycardia. Occasional PVCs.    EEG Classification / Summary:  Abnormal video-EEG in a lethargic patient.  -Intermittent periods of generalized periodic discharges, with triphasic morphology, at ~1 Hz.   -Moderate to severe diffuse slowing, with variable continuity as above.    Clinical Impression:  -These findings typically suggests severe metabolic encephalopathy; however, in certain clinical scenarios GPD may signify elevated risk of seizures.  -Single-lead EKG shows irregularly irregular rhythm and tachycardia.  This is a preliminary report pending attending review and attestation.    Yessenia Souza MD  Fellow, [facility] Epilepsy [place]      SHAIK DONOHUE [facility]-[id number]     Study Date: 2/3/24 0800 - 2/4/24 0800  Duration: 24 hr  --------------------------------------------------------------------------------------------------  History:  CC/ HPI Patient is a 89y old  Male who presents with a chief complaint of COVID19, Chest pain (02 Feb 2024 07:32)    MEDICATIONS  (STANDING):  albuterol/ipratropium for Nebulization 3 milliLiter(s) Nebulizer every 6 hours  artificial  tears Solution 1 Drop(s) Left EYE four times a day  aspirin Suppository 300 milliGRAM(s) Rectal daily  chlorhexidine 2% Cloths 1 Application(s) Topical daily  dextrose 5%. 1000 milliLiter(s) (100 mL/Hr) IV Continuous <Continuous>  dextrose 50% Injectable 25 Gram(s) IV Push once  dextrose 50% Injectable 25 Gram(s) IV Push once  dextrose 50% Injectable 12.5 Gram(s) IV Push once  dextrose Oral Gel 15 Gram(s) Oral once  escitalopram 10 milliGRAM(s) Oral daily  glucagon  Injectable 1 milliGRAM(s) IntraMuscular once  insulin lispro (ADMELOG) corrective regimen sliding scale   SubCutaneous every 6 hours  levETIRAcetam  IVPB 500 milliGRAM(s) IV Intermittent every 12 hours  lidocaine   4% Patch 1 Patch Transdermal every 24 hours  pantoprazole  Injectable 40 milliGRAM(s) IV Push every 12 hours  petrolatum Ophthalmic Ointment 1 Application(s) Left EYE at bedtime  sodium chloride 3%  Inhalation 4 milliLiter(s) Inhalation every 6 hours  sucralfate suspension 1 Gram(s) Enteral Tube two times a day  valproate sodium  IVPB 500 milliGRAM(s) IV Intermittent every 8 hours    --------------------------------------------------------------------------------------------------  Study Interpretation:    [[[Abbreviation Key:  PDR=alpha rhythm/posterior dominant rhythm. A-P=anterior posterior.  Amplitude: ‘very low’:<20; ‘low’:20-49; ‘medium’:; ‘high’:>150uV.  Persistence for periodic/rhythmic patterns (% of epoch) ‘rare’:<1%; ‘occasional’:1-10%; ‘frequent’:10-50%; ‘abundant’:50-90%; ‘continuous’:>90%.  Persistence for sporadic discharges: ‘rare’:<1/hr; ‘occasional’:1/min-1/hr; ‘frequent’:>1/min; ‘abundant’:>1/10 sec.  RPP=rhythmic and periodic patterns; GRDA=generalized rhythmic delta activity; FIRDA=frontal intermittent GRDA; LRDA=lateralized rhythmic delta activity; TIRDA=temporal intermittent rhythmic delta activity;  LPD=PLED=lateralized periodic discharges; GPD=generalized periodic discharges; BIPDs =bilateral independent periodic discharges; Mf=multifocal; SIRPDs=stimulus induced rhythmic, periodic, or ictal appearing discharges; BIRDs=brief potentially ictal rhythmic discharges >4 Hz, lasting .5-10s; PFA (paroxysmal bursts >13 Hz or =8 Hz <10s).  Modifiers: +F=with fast component; +S=with spike component; +R=with rhythmic component.  S-B=burst suppression pattern.  Max=maximal. N1-drowsy; N2-stage II sleep; N3-slow wave sleep. SSS/BETS=small sharp spikes/benign epileptiform transients of sleep. HV=hyperventilation; PS=photic stimulation]]]    Daily EEG Visual Analysis    FINDINGS:      Background:  The background is discontinuous and symmetric.  The predominant background consists of diffuse polymorphic delta slowing, intermittent theta. There is no posterior dominant rhythm.     Background Slowing:  Generalized slowing: As above  Focal slowing: None    State Changes:   Absent    Interictal Findings:  Intermittent periods of generalized periodic discharges, with triphasic morphology, at ~1 Hz.     Electrographic and Electroclinical seizures:  No clear seizures observed.    Other Clinical Events:  No press button events.    Activation Procedures:   Hyperventilation is not performed.    Photic stimulation is not performed.    Artifacts:  Intermittent myogenic and movement artifacts are present.    EKG:  Single-lead EKG shows in termittent irregularly irregular rhythm and tachycardia. Occasional PVCs.    EEG Classification / Summary:  Abnormal video-EEG in a lethargic patient.  -Intermittent periods of generalized periodic discharges, with triphasic morphology, at ~1 Hz.   -Moderate to severe diffuse slowing, with variable continuity as above.    Clinical Impression:  -These findings typically suggests severe metabolic encephalopathy; however, in certain clinical scenarios GPD may signify elevated risk of seizures.  -Single-lead EKG shows irregularly irregular rhythm and tachycardia.    Yessenia Souza MD  Fellow, [facility]    Rafy Kulkarni MD PhD  Director, Epilepsy Division, [facility] and [facility] EEG Reading Room Ph#: [phone]  Epilepsy Answering Service after 5PM and before 8:30AM: Ph#: [phone]

## 2024-02-04 NOTE — PROGRESS NOTE ADULT - ASSESSMENT
Mr. Foss is a 89 yo man with sudden worsening neurological status with focal findings.  Intracranial and extracranial atherosclerosis.   The differential diagnosis includes: stroke, toxic metabolic septic encephalopathy.  Aspirin, statin.    I had extensive discussion again with epileptology attending about the clinical and EEG findings.  EEG is consistent with metabolic encephalopathy and not seizure.  There is no indication to treat with antiseizure medications at this time.    They recommend:    Avoid propofol and midazolam.  Please use precedex for necessary clinical sedation.  Continue levetiracetam 500 mg Q12H - tomorrow can decrease back to baseline dose of 250 mg Q12H (he is not on this for epilepsy).  Stop valproic acid  Continue EEG.  D/W neurology resident who discussed with primary team.    D/W son  Thank you    There is a high probability of sudden, clinically significant, or life threatening deterioration in the patient’s condition which requires the highest level of physician preparedness to intervene urgently.  Critical care time:  65 minutes.  Mr. Foss is a 89 yo man with sudden worsening neurological status with focal findings.  Intracranial and extracranial atherosclerosis.   The differential diagnosis includes: stroke, toxic metabolic septic encephalopathy.  Aspirin, statin.    I had extensive discussion again with epileptology attending about the clinical and EEG findings.  EEG is consistent with metabolic encephalopathy and not seizure.  There is no indication to treat with antiseizure medications at this time.    They recommend:    Avoid propofol and midazolam.  Please use precedex for necessary clinical sedation.  Continue levetiracetam 500 mg Q12H - tomorrow can decrease back to baseline dose of 250 mg Q12H (he is not on this for epilepsy).  Stop valproic acid  Continue EEG.  D/W neurology resident who discussed with primary team.    D/W son  Thank you    There is a high probability of sudden, clinically significant, or life threatening deterioration in the patient’s condition which requires the highest level of physician preparedness to intervene urgently.  Critical care time:  40 minutes.     There is a high probability of sudden, clinically significant, or life threatening deterioration in the patient’s condition which requires the highest level of physician preparedness to intervene urgently.  Critical care time:  65 minutes.

## 2024-02-04 NOTE — PROGRESS NOTE ADULT - SUBJECTIVE AND OBJECTIVE BOX
Per nurse - Off propofol since 06:00 this AM.     Vital Signs Last 24 Hrs  T(C): 36.3 (04 Feb 2024 12:00), Max: 37.2 (04 Feb 2024 08:00)  T(F): 97.4 (04 Feb 2024 12:00), Max: 98.9 (04 Feb 2024 08:00)  HR: 83 (04 Feb 2024 15:23) (81 - 94)  BP: 133/60 (04 Feb 2024 14:00) (100/44 - 145/60)  BP(mean): 78 (04 Feb 2024 14:00) (57 - 81)  RR: 14 (04 Feb 2024 14:00) (12 - 22)  SpO2: 100% (04 Feb 2024 15:23) (100% - 100%)    Parameters below as of 04 Feb 2024 15:23  Patient On (Oxygen Delivery Method): ventilator    Exam:  MS: Eyes open to tactile stimulation.  No fix and follow.  No follow commands. Localizes with B arms in response to supraorbital pressure.  CN:  No BTT.  Eyes in primary gaze - EOMI with OCR.  Corneal reflex is intact and symmetric.  Eyes close symmetrically and strongly.  Pupils round,  1.5mm-->1mm, B.  Gag+  No abnormal movements.    Motor/sensory:  Tone: arms with cogwheel rigidity and increased voluntary tone vs. extensor posturing.   Legs w/d slightly to pain.        2/4/24  EEG Classification / Summary:  Abnormal video-EEG in a lethargic patient.  -Intermittent periods of generalized periodic discharges, with triphasic morphology, at ~1 Hz.   -Moderate to severe diffuse slowing, with variable continuity as above.    Clinical Impression:  -These findings typically suggests severe metabolic encephalopathy; however, in certain clinical scenarios GPD may signify elevated risk of seizures.  -Single-lead EKG shows irregularly irregular rhythm and tachycardia.    Valproic Acid Level, Serum: 27.10 ug/mL (02.04.24 @ 05:00)   Valproic Acid Level, Serum: 14.50 ug/mL (02.02.24 @ 06:19)

## 2024-02-04 NOTE — PROGRESS NOTE ADULT - SUBJECTIVE AND OBJECTIVE BOX
INTERVAL HPI/OVERNIGHT EVENTS:    O/N:    SUBJECTIVE: Patient seen and examined at bedside.     CONSTITUTIONAL: No weakness, fevers or chills  EYES/ENT: No visual changes;  No vertigo or throat pain   NECK: No pain or stiffness  RESPIRATORY: No cough, wheezing, hemoptysis; No shortness of breath  CARDIOVASCULAR: No chest pain or palpitations  GASTROINTESTINAL: No abdominal or epigastric pain. No nausea, vomiting, or hematemesis; No diarrhea or constipation. No melena or hematochezia.  GENITOURINARY: No dysuria, frequency or hematuria  NEUROLOGICAL: No numbness or weakness  SKIN: No itching, rashes    OBJECTIVE:    VITAL SIGNS:  ICU Vital Signs Last 24 Hrs  T(C): 36.3 (04 Feb 2024 04:00), Max: 36.9 (03 Feb 2024 16:00)  T(F): 97.4 (04 Feb 2024 04:00), Max: 98.5 (03 Feb 2024 16:00)  HR: 90 (04 Feb 2024 06:00) (77 - 97)  BP: 120/56 (04 Feb 2024 06:00) (100/44 - 151/61)  BP(mean): 68 (04 Feb 2024 06:00) (57 - 83)  ABP: --  ABP(mean): --  RR: 14 (04 Feb 2024 06:00) (14 - 22)  SpO2: 100% (04 Feb 2024 06:00) (100% - 100%)    O2 Parameters below as of 04 Feb 2024 07:00  Patient On (Oxygen Delivery Method): ventilator          Mode: AC/ CMV (Assist Control/ Continuous Mandatory Ventilation), RR (machine): 12, TV (machine): 450, FiO2: 40, PEEP: 5, ITime: 0.88, MAP: 9, PIP: 32    02-03 @ 07:01  -  02-04 @ 07:00  --------------------------------------------------------  IN: 3227.8 mL / OUT: 3905 mL / NET: -677.2 mL      CAPILLARY BLOOD GLUCOSE      POCT Blood Glucose.: 187 mg/dL (04 Feb 2024 05:53)      PHYSICAL EXAM:    General: NAD  HEENT: NC/AT; PERRL, clear conjunctiva  Neck: supple  Respiratory: CTA b/l  Cardiovascular: +S1/S2; RRR  Abdomen: soft, NT/ND; +BS x4  Extremities: WWP, 2+ peripheral pulses b/l; no LE edema  Skin: normal color and turgor; no rash  Neurological:    MEDICATIONS:  MEDICATIONS  (STANDING):  albuterol/ipratropium for Nebulization 3 milliLiter(s) Nebulizer every 6 hours  artificial  tears Solution 1 Drop(s) Left EYE four times a day  aspirin Suppository 300 milliGRAM(s) Rectal daily  buMETAnide Injectable 2 milliGRAM(s) IV Push every 12 hours  chlorhexidine 0.12% Liquid 15 milliLiter(s) Oral Mucosa every 12 hours  chlorhexidine 2% Cloths 1 Application(s) Topical daily  dexMEDEtomidine Infusion 0.2 MICROgram(s)/kG/Hr (3.97 mL/Hr) IV Continuous <Continuous>  dextrose 5%. 1000 milliLiter(s) (100 mL/Hr) IV Continuous <Continuous>  dextrose 5%. 1000 milliLiter(s) (50 mL/Hr) IV Continuous <Continuous>  dextrose 50% Injectable 25 Gram(s) IV Push once  dextrose 50% Injectable 12.5 Gram(s) IV Push once  dextrose 50% Injectable 25 Gram(s) IV Push once  escitalopram 10 milliGRAM(s) Oral daily  glucagon  Injectable 1 milliGRAM(s) IntraMuscular once  heparin   Injectable 5000 Unit(s) SubCutaneous every 8 hours  insulin glargine Injectable (LANTUS) 15 Unit(s) SubCutaneous <User Schedule>  insulin lispro (ADMELOG) corrective regimen sliding scale   SubCutaneous every 6 hours  levETIRAcetam  IVPB 500 milliGRAM(s) IV Intermittent every 12 hours  lidocaine   4% Patch 1 Patch Transdermal every 24 hours  norepinephrine Infusion 0.05 MICROgram(s)/kG/Min (3.72 mL/Hr) IV Continuous <Continuous>  pantoprazole  Injectable 40 milliGRAM(s) IV Push every 12 hours  petrolatum Ophthalmic Ointment 1 Application(s) Left EYE at bedtime  propofol Infusion 30.055 MICROgram(s)/kG/Min (14.3 mL/Hr) IV Continuous <Continuous>  sodium chloride 3%  Inhalation 4 milliLiter(s) Inhalation every 6 hours  sucralfate suspension 1 Gram(s) Enteral Tube two times a day  valproate sodium  IVPB 600 milliGRAM(s) IV Intermittent every 6 hours    MEDICATIONS  (PRN):  dextrose Oral Gel 15 Gram(s) Oral once PRN Blood Glucose LESS THAN 70 milliGRAM(s)/deciliter      ALLERGIES:  Allergies    fluoroquinolone antibiotics (Other)  Cipro (Unknown)  Tegretol (Unknown)  carbamazepine (Other)    Intolerances        LABS:                        7.4    7.13  )-----------( 369      ( 04 Feb 2024 02:46 )             24.0     02-04    144  |  103  |  32<H>  ----------------------------<  148<H>  3.7   |  31  |  1.87<H>    Ca    8.0<L>      04 Feb 2024 02:46  Phos  3.4     02-04  Mg     2.10     02-04    TPro  6.0  /  Alb  2.2<L>  /  TBili  0.2  /  DBili  x   /  AST  29  /  ALT  37  /  AlkPhos  110  02-04    PT/INR - ( 04 Feb 2024 02:46 )   PT: 12.6 sec;   INR: 1.12 ratio         PTT - ( 04 Feb 2024 02:46 )  PTT:31.0 sec  Urinalysis Basic - ( 04 Feb 2024 02:46 )    Color: x / Appearance: x / SG: x / pH: x  Gluc: 148 mg/dL / Ketone: x  / Bili: x / Urobili: x   Blood: x / Protein: x / Nitrite: x   Leuk Esterase: x / RBC: x / WBC x   Sq Epi: x / Non Sq Epi: x / Bacteria: x        RADIOLOGY & ADDITIONAL TESTS: Reviewed. INTERVAL HPI/OVERNIGHT EVENTS:    O/N: Ventilator adjusted for high pH.     SUBJECTIVE: Patient seen and examined at bedside.     ROS unobtainable due to patient's current condition     OBJECTIVE:    VITAL SIGNS:  ICU Vital Signs Last 24 Hrs  T(C): 36.3 (04 Feb 2024 04:00), Max: 36.9 (03 Feb 2024 16:00)  T(F): 97.4 (04 Feb 2024 04:00), Max: 98.5 (03 Feb 2024 16:00)  HR: 90 (04 Feb 2024 06:00) (77 - 97)  BP: 120/56 (04 Feb 2024 06:00) (100/44 - 151/61)  BP(mean): 68 (04 Feb 2024 06:00) (57 - 83)  ABP: --  ABP(mean): --  RR: 14 (04 Feb 2024 06:00) (14 - 22)  SpO2: 100% (04 Feb 2024 06:00) (100% - 100%)    O2 Parameters below as of 04 Feb 2024 07:00  Patient On (Oxygen Delivery Method): ventilator          Mode: AC/ CMV (Assist Control/ Continuous Mandatory Ventilation), RR (machine): 12, TV (machine): 450, FiO2: 40, PEEP: 5, ITime: 0.88, MAP: 9, PIP: 32    02-03 @ 07:01  -  02-04 @ 07:00  --------------------------------------------------------  IN: 3227.8 mL / OUT: 3905 mL / NET: -677.2 mL      CAPILLARY BLOOD GLUCOSE      POCT Blood Glucose.: 187 mg/dL (04 Feb 2024 05:53)      PHYSICAL EXAM:    General: NAD, sedated  HEENT: NC/AT; PERRL   Neck: supple  Respiratory: Coarse b/l  Cardiovascular: +S1/S2; RRR  Abdomen: soft, NT/ND; +BS x4  Extremities: WWP, 2+ peripheral pulses b/l;   Skin: normal color and turgor; no rash  Neurological: PERRLA, sedated ao0    MEDICATIONS:  MEDICATIONS  (STANDING):  albuterol/ipratropium for Nebulization 3 milliLiter(s) Nebulizer every 6 hours  artificial  tears Solution 1 Drop(s) Left EYE four times a day  aspirin Suppository 300 milliGRAM(s) Rectal daily  buMETAnide Injectable 2 milliGRAM(s) IV Push every 12 hours  chlorhexidine 0.12% Liquid 15 milliLiter(s) Oral Mucosa every 12 hours  chlorhexidine 2% Cloths 1 Application(s) Topical daily  dexMEDEtomidine Infusion 0.2 MICROgram(s)/kG/Hr (3.97 mL/Hr) IV Continuous <Continuous>  dextrose 5%. 1000 milliLiter(s) (100 mL/Hr) IV Continuous <Continuous>  dextrose 5%. 1000 milliLiter(s) (50 mL/Hr) IV Continuous <Continuous>  dextrose 50% Injectable 25 Gram(s) IV Push once  dextrose 50% Injectable 12.5 Gram(s) IV Push once  dextrose 50% Injectable 25 Gram(s) IV Push once  escitalopram 10 milliGRAM(s) Oral daily  glucagon  Injectable 1 milliGRAM(s) IntraMuscular once  heparin   Injectable 5000 Unit(s) SubCutaneous every 8 hours  insulin glargine Injectable (LANTUS) 15 Unit(s) SubCutaneous <User Schedule>  insulin lispro (ADMELOG) corrective regimen sliding scale   SubCutaneous every 6 hours  levETIRAcetam  IVPB 500 milliGRAM(s) IV Intermittent every 12 hours  lidocaine   4% Patch 1 Patch Transdermal every 24 hours  norepinephrine Infusion 0.05 MICROgram(s)/kG/Min (3.72 mL/Hr) IV Continuous <Continuous>  pantoprazole  Injectable 40 milliGRAM(s) IV Push every 12 hours  petrolatum Ophthalmic Ointment 1 Application(s) Left EYE at bedtime  propofol Infusion 30.055 MICROgram(s)/kG/Min (14.3 mL/Hr) IV Continuous <Continuous>  sodium chloride 3%  Inhalation 4 milliLiter(s) Inhalation every 6 hours  sucralfate suspension 1 Gram(s) Enteral Tube two times a day  valproate sodium  IVPB 600 milliGRAM(s) IV Intermittent every 6 hours    MEDICATIONS  (PRN):  dextrose Oral Gel 15 Gram(s) Oral once PRN Blood Glucose LESS THAN 70 milliGRAM(s)/deciliter      ALLERGIES:  Allergies    fluoroquinolone antibiotics (Other)  Cipro (Unknown)  Tegretol (Unknown)  carbamazepine (Other)    Intolerances        LABS:                        7.4    7.13  )-----------( 369      ( 04 Feb 2024 02:46 )             24.0     02-04    144  |  103  |  32<H>  ----------------------------<  148<H>  3.7   |  31  |  1.87<H>    Ca    8.0<L>      04 Feb 2024 02:46  Phos  3.4     02-04  Mg     2.10     02-04    TPro  6.0  /  Alb  2.2<L>  /  TBili  0.2  /  DBili  x   /  AST  29  /  ALT  37  /  AlkPhos  110  02-04    PT/INR - ( 04 Feb 2024 02:46 )   PT: 12.6 sec;   INR: 1.12 ratio         PTT - ( 04 Feb 2024 02:46 )  PTT:31.0 sec  Urinalysis Basic - ( 04 Feb 2024 02:46 )    Color: x / Appearance: x / SG: x / pH: x  Gluc: 148 mg/dL / Ketone: x  / Bili: x / Urobili: x   Blood: x / Protein: x / Nitrite: x   Leuk Esterase: x / RBC: x / WBC x   Sq Epi: x / Non Sq Epi: x / Bacteria: x        RADIOLOGY & ADDITIONAL TESTS: Reviewed. 3

## 2024-02-05 LAB
ALBUMIN SERPL ELPH-MCNC: 2.2 G/DL — LOW (ref 3.3–5)
ALP SERPL-CCNC: 110 U/L — SIGNIFICANT CHANGE UP (ref 40–120)
ALT FLD-CCNC: 29 U/L — SIGNIFICANT CHANGE UP (ref 4–41)
ANION GAP SERPL CALC-SCNC: 10 MMOL/L — SIGNIFICANT CHANGE UP (ref 7–14)
APTT BLD: 32.6 SEC — SIGNIFICANT CHANGE UP (ref 24.5–35.6)
AST SERPL-CCNC: 24 U/L — SIGNIFICANT CHANGE UP (ref 4–40)
BILIRUB SERPL-MCNC: 0.2 MG/DL — SIGNIFICANT CHANGE UP (ref 0.2–1.2)
BUN SERPL-MCNC: 34 MG/DL — HIGH (ref 7–23)
CALCIUM SERPL-MCNC: 7.9 MG/DL — LOW (ref 8.4–10.5)
CHLORIDE SERPL-SCNC: 99 MMOL/L — SIGNIFICANT CHANGE UP (ref 98–107)
CO2 SERPL-SCNC: 30 MMOL/L — SIGNIFICANT CHANGE UP (ref 22–31)
CREAT SERPL-MCNC: 1.73 MG/DL — HIGH (ref 0.5–1.3)
EGFR: 37 ML/MIN/1.73M2 — LOW
GAS PNL BLDV: SIGNIFICANT CHANGE UP
GLUCOSE BLDC GLUCOMTR-MCNC: 159 MG/DL — HIGH (ref 70–99)
GLUCOSE BLDC GLUCOMTR-MCNC: 160 MG/DL — HIGH (ref 70–99)
GLUCOSE BLDC GLUCOMTR-MCNC: 182 MG/DL — HIGH (ref 70–99)
GLUCOSE BLDC GLUCOMTR-MCNC: 214 MG/DL — HIGH (ref 70–99)
GLUCOSE SERPL-MCNC: 140 MG/DL — HIGH (ref 70–99)
HCT VFR BLD CALC: 24.1 % — LOW (ref 39–50)
HGB BLD-MCNC: 7.7 G/DL — LOW (ref 13–17)
INR BLD: 1.07 RATIO — SIGNIFICANT CHANGE UP (ref 0.85–1.18)
MAGNESIUM SERPL-MCNC: 1.9 MG/DL — SIGNIFICANT CHANGE UP (ref 1.6–2.6)
MCHC RBC-ENTMCNC: 29.4 PG — SIGNIFICANT CHANGE UP (ref 27–34)
MCHC RBC-ENTMCNC: 32 GM/DL — SIGNIFICANT CHANGE UP (ref 32–36)
MCV RBC AUTO: 92 FL — SIGNIFICANT CHANGE UP (ref 80–100)
NRBC # BLD: 0 /100 WBCS — SIGNIFICANT CHANGE UP (ref 0–0)
NRBC # FLD: 0 K/UL — SIGNIFICANT CHANGE UP (ref 0–0)
PHOSPHATE SERPL-MCNC: 3.5 MG/DL — SIGNIFICANT CHANGE UP (ref 2.5–4.5)
PLATELET # BLD AUTO: 357 K/UL — SIGNIFICANT CHANGE UP (ref 150–400)
POTASSIUM SERPL-MCNC: 3.9 MMOL/L — SIGNIFICANT CHANGE UP (ref 3.5–5.3)
POTASSIUM SERPL-SCNC: 3.9 MMOL/L — SIGNIFICANT CHANGE UP (ref 3.5–5.3)
PROT SERPL-MCNC: 6.2 G/DL — SIGNIFICANT CHANGE UP (ref 6–8.3)
PROTHROM AB SERPL-ACNC: 12.1 SEC — SIGNIFICANT CHANGE UP (ref 9.5–13)
RBC # BLD: 2.62 M/UL — LOW (ref 4.2–5.8)
RBC # FLD: 17.6 % — HIGH (ref 10.3–14.5)
SODIUM SERPL-SCNC: 139 MMOL/L — SIGNIFICANT CHANGE UP (ref 135–145)
WBC # BLD: 8.14 K/UL — SIGNIFICANT CHANGE UP (ref 3.8–10.5)
WBC # FLD AUTO: 8.14 K/UL — SIGNIFICANT CHANGE UP (ref 3.8–10.5)

## 2024-02-05 PROCEDURE — 99291 CRITICAL CARE FIRST HOUR: CPT

## 2024-02-05 PROCEDURE — 99233 SBSQ HOSP IP/OBS HIGH 50: CPT | Mod: GC

## 2024-02-05 PROCEDURE — 95720 EEG PHY/QHP EA INCR W/VEEG: CPT

## 2024-02-05 RX ORDER — ASPIRIN/CALCIUM CARB/MAGNESIUM 324 MG
81 TABLET ORAL DAILY
Refills: 0 | Status: DISCONTINUED | OUTPATIENT
Start: 2024-02-05 | End: 2024-02-17

## 2024-02-05 RX ORDER — SODIUM CHLORIDE 9 MG/ML
4 INJECTION INTRAMUSCULAR; INTRAVENOUS; SUBCUTANEOUS
Refills: 0 | Status: DISCONTINUED | OUTPATIENT
Start: 2024-02-05 | End: 2024-02-22

## 2024-02-05 RX ORDER — LEVETIRACETAM 250 MG/1
250 TABLET, FILM COATED ORAL EVERY 12 HOURS
Refills: 0 | Status: DISCONTINUED | OUTPATIENT
Start: 2024-02-05 | End: 2024-02-05

## 2024-02-05 RX ORDER — LEVETIRACETAM 250 MG/1
500 TABLET, FILM COATED ORAL EVERY 12 HOURS
Refills: 0 | Status: DISCONTINUED | OUTPATIENT
Start: 2024-02-05 | End: 2024-02-22

## 2024-02-05 RX ADMIN — INSULIN GLARGINE 15 UNIT(S): 100 INJECTION, SOLUTION SUBCUTANEOUS at 12:34

## 2024-02-05 RX ADMIN — Medication 3.72 MICROGRAM(S)/KG/MIN: at 07:46

## 2024-02-05 RX ADMIN — Medication 1 DROP(S): at 06:30

## 2024-02-05 RX ADMIN — ESCITALOPRAM OXALATE 10 MILLIGRAM(S): 10 TABLET, FILM COATED ORAL at 12:11

## 2024-02-05 RX ADMIN — LEVETIRACETAM 400 MILLIGRAM(S): 250 TABLET, FILM COATED ORAL at 06:25

## 2024-02-05 RX ADMIN — LEVETIRACETAM 400 MILLIGRAM(S): 250 TABLET, FILM COATED ORAL at 17:13

## 2024-02-05 RX ADMIN — Medication 1 GRAM(S): at 17:13

## 2024-02-05 RX ADMIN — Medication 2: at 23:12

## 2024-02-05 RX ADMIN — Medication 1 DROP(S): at 17:44

## 2024-02-05 RX ADMIN — Medication 1 APPLICATION(S): at 21:13

## 2024-02-05 RX ADMIN — Medication 1 DROP(S): at 23:10

## 2024-02-05 RX ADMIN — LIDOCAINE 1 PATCH: 4 CREAM TOPICAL at 20:14

## 2024-02-05 RX ADMIN — Medication 3 MILLILITER(S): at 21:00

## 2024-02-05 RX ADMIN — HEPARIN SODIUM 5000 UNIT(S): 5000 INJECTION INTRAVENOUS; SUBCUTANEOUS at 06:30

## 2024-02-05 RX ADMIN — LIDOCAINE 1 PATCH: 4 CREAM TOPICAL at 10:23

## 2024-02-05 RX ADMIN — Medication 3 MILLILITER(S): at 15:51

## 2024-02-05 RX ADMIN — HEPARIN SODIUM 5000 UNIT(S): 5000 INJECTION INTRAVENOUS; SUBCUTANEOUS at 21:12

## 2024-02-05 RX ADMIN — Medication 1 DROP(S): at 12:12

## 2024-02-05 RX ADMIN — SODIUM CHLORIDE 4 MILLILITER(S): 9 INJECTION INTRAMUSCULAR; INTRAVENOUS; SUBCUTANEOUS at 03:24

## 2024-02-05 RX ADMIN — CHLORHEXIDINE GLUCONATE 15 MILLILITER(S): 213 SOLUTION TOPICAL at 17:13

## 2024-02-05 RX ADMIN — DEXMEDETOMIDINE HYDROCHLORIDE IN 0.9% SODIUM CHLORIDE 3.97 MICROGRAM(S)/KG/HR: 4 INJECTION INTRAVENOUS at 07:45

## 2024-02-05 RX ADMIN — CHLORHEXIDINE GLUCONATE 1 APPLICATION(S): 213 SOLUTION TOPICAL at 12:11

## 2024-02-05 RX ADMIN — Medication 1 GRAM(S): at 06:30

## 2024-02-05 RX ADMIN — Medication 3 MILLILITER(S): at 10:50

## 2024-02-05 RX ADMIN — CHLORHEXIDINE GLUCONATE 15 MILLILITER(S): 213 SOLUTION TOPICAL at 06:30

## 2024-02-05 RX ADMIN — PANTOPRAZOLE SODIUM 40 MILLIGRAM(S): 20 TABLET, DELAYED RELEASE ORAL at 17:13

## 2024-02-05 RX ADMIN — Medication 2: at 06:28

## 2024-02-05 RX ADMIN — HEPARIN SODIUM 5000 UNIT(S): 5000 INJECTION INTRAVENOUS; SUBCUTANEOUS at 13:25

## 2024-02-05 RX ADMIN — Medication 4: at 12:11

## 2024-02-05 RX ADMIN — Medication 3 MILLILITER(S): at 03:22

## 2024-02-05 RX ADMIN — LIDOCAINE 1 PATCH: 4 CREAM TOPICAL at 07:00

## 2024-02-05 RX ADMIN — SODIUM CHLORIDE 4 MILLILITER(S): 9 INJECTION INTRAMUSCULAR; INTRAVENOUS; SUBCUTANEOUS at 21:02

## 2024-02-05 RX ADMIN — Medication 81 MILLIGRAM(S): at 12:11

## 2024-02-05 RX ADMIN — Medication 2: at 17:14

## 2024-02-05 RX ADMIN — PANTOPRAZOLE SODIUM 40 MILLIGRAM(S): 20 TABLET, DELAYED RELEASE ORAL at 06:40

## 2024-02-05 NOTE — EEG REPORT - NS EEG TEXT BOX
SHAIK DONOHUE [facility]-[id number]     Study Date: 2/4/24 0800 - 2/5/24 0800  Duration: 23 hr 58 m  --------------------------------------------------------------------------------------------------  History:  CC/ HPI Patient is a 89y old  Male who presents with a chief complaint of COVID19, Chest pain (02 Feb 2024 07:32)    MEDICATIONS  (STANDING):  albuterol/ipratropium for Nebulization 3 milliLiter(s) Nebulizer every 6 hours  artificial  tears Solution 1 Drop(s) Left EYE four times a day  aspirin Suppository 300 milliGRAM(s) Rectal daily  chlorhexidine 2% Cloths 1 Application(s) Topical daily  dextrose 5%. 1000 milliLiter(s) (100 mL/Hr) IV Continuous <Continuous>  dextrose 50% Injectable 25 Gram(s) IV Push once  dextrose 50% Injectable 25 Gram(s) IV Push once  dextrose 50% Injectable 12.5 Gram(s) IV Push once  dextrose Oral Gel 15 Gram(s) Oral once  escitalopram 10 milliGRAM(s) Oral daily  glucagon  Injectable 1 milliGRAM(s) IntraMuscular once  insulin lispro (ADMELOG) corrective regimen sliding scale   SubCutaneous every 6 hours  levETIRAcetam  IVPB 500 milliGRAM(s) IV Intermittent every 12 hours  lidocaine   4% Patch 1 Patch Transdermal every 24 hours  pantoprazole  Injectable 40 milliGRAM(s) IV Push every 12 hours  petrolatum Ophthalmic Ointment 1 Application(s) Left EYE at bedtime  sodium chloride 3%  Inhalation 4 milliLiter(s) Inhalation every 6 hours  sucralfate suspension 1 Gram(s) Enteral Tube two times a day  valproate sodium  IVPB 500 milliGRAM(s) IV Intermittent every 8 hours    --------------------------------------------------------------------------------------------------  Study Interpretation:    [[[Abbreviation Key:  PDR=alpha rhythm/posterior dominant rhythm. A-P=anterior posterior.  Amplitude: ‘very low’:<20; ‘low’:20-49; ‘medium’:; ‘high’:>150uV.  Persistence for periodic/rhythmic patterns (% of epoch) ‘rare’:<1%; ‘occasional’:1-10%; ‘frequent’:10-50%; ‘abundant’:50-90%; ‘continuous’:>90%.  Persistence for sporadic discharges: ‘rare’:<1/hr; ‘occasional’:1/min-1/hr; ‘frequent’:>1/min; ‘abundant’:>1/10 sec.  RPP=rhythmic and periodic patterns; GRDA=generalized rhythmic delta activity; FIRDA=frontal intermittent GRDA; LRDA=lateralized rhythmic delta activity; TIRDA=temporal intermittent rhythmic delta activity;  LPD=PLED=lateralized periodic discharges; GPD=generalized periodic discharges; BIPDs =bilateral independent periodic discharges; Mf=multifocal; SIRPDs=stimulus induced rhythmic, periodic, or ictal appearing discharges; BIRDs=brief potentially ictal rhythmic discharges >4 Hz, lasting .5-10s; PFA (paroxysmal bursts >13 Hz or =8 Hz <10s).  Modifiers: +F=with fast component; +S=with spike component; +R=with rhythmic component.  S-B=burst suppression pattern.  Max=maximal. N1-drowsy; N2-stage II sleep; N3-slow wave sleep. SSS/BETS=small sharp spikes/benign epileptiform transients of sleep. HV=hyperventilation; PS=photic stimulation]]]    Daily EEG Visual Analysis    FINDINGS:      Background:  The background is continuous and symmetric.  The predominant background consists of diffuse polymorphic delta with intermixed theta. There is no posterior dominant rhythm.     Background Slowing:  Generalized slowing: As above  Focal slowing: None    State Changes:   Drowsiness in noted with further slowing of the background  Stage II sleep architecture not attained.     Interictal Findings:  Generalized periodic discharges with bifrontal predominance, occasionally with triphasic morphology, at ~1 to 1.5 Hz, reach upto 2hz with arousal and stimulation with no clear evolution.     Electrographic and Electroclinical seizures:  No clear seizures observed.    Other Clinical Events:  One push button at 2239 - few seconds prior to the push button event, patient was noted to be coughing over the vent. EEG with ongoing GPDs at above.     Activation Procedures:   Hyperventilation is not performed.    Photic stimulation is not performed.    Artifacts:  Intermittent myogenic and movement artifacts are present.    EKG:  Single-lead EKG shows regular rhythm.    EEG Classification / Summary:  Abnormal video-EEG in a lethargic patient.  -GPDs, at times with triphasic morphology with fluctuating frequency (between 1 to 2hz)  -Moderate to severe diffuse slowing, with variable continuity as above.  - One push button at 2239 - for coughing over the vent? with electrographic seizure on EEG.      Clinical Impression:  -These findings typically suggests severe metabolic encephalopathy; however, in certain clinical scenarios GPD may signify elevated risk of seizures especially with frequency >2hz.     This is fellow preliminary read, pending attending review.    [facility] EEG Reading Room Ph#: [phone]  Epilepsy Answering Service after 5PM and before 8:30AM: Ph#: [phone]     FRANCHESCA Iniguez  Epilepsy Fellow   SHAIK DONOHUE [facility]-[id number]     Study Date: 2/4/24 0800 - 2/5/24 0800  Duration: 23 hr 58 m  --------------------------------------------------------------------------------------------------  History:  CC/ HPI Patient is a 89y old  Male who presents with a chief complaint of COVID19, Chest pain (02 Feb 2024 07:32)    MEDICATIONS  (STANDING):  albuterol/ipratropium for Nebulization 3 milliLiter(s) Nebulizer every 6 hours  artificial  tears Solution 1 Drop(s) Left EYE four times a day  aspirin Suppository 300 milliGRAM(s) Rectal daily  chlorhexidine 2% Cloths 1 Application(s) Topical daily  dextrose 5%. 1000 milliLiter(s) (100 mL/Hr) IV Continuous <Continuous>  dextrose 50% Injectable 25 Gram(s) IV Push once  dextrose 50% Injectable 25 Gram(s) IV Push once  dextrose 50% Injectable 12.5 Gram(s) IV Push once  dextrose Oral Gel 15 Gram(s) Oral once  escitalopram 10 milliGRAM(s) Oral daily  glucagon  Injectable 1 milliGRAM(s) IntraMuscular once  insulin lispro (ADMELOG) corrective regimen sliding scale   SubCutaneous every 6 hours  levETIRAcetam  IVPB 500 milliGRAM(s) IV Intermittent every 12 hours  lidocaine   4% Patch 1 Patch Transdermal every 24 hours  pantoprazole  Injectable 40 milliGRAM(s) IV Push every 12 hours  petrolatum Ophthalmic Ointment 1 Application(s) Left EYE at bedtime  sodium chloride 3%  Inhalation 4 milliLiter(s) Inhalation every 6 hours  sucralfate suspension 1 Gram(s) Enteral Tube two times a day  valproate sodium  IVPB 500 milliGRAM(s) IV Intermittent every 8 hours    --------------------------------------------------------------------------------------------------  Study Interpretation:    [[[Abbreviation Key:  PDR=alpha rhythm/posterior dominant rhythm. A-P=anterior posterior.  Amplitude: ‘very low’:<20; ‘low’:20-49; ‘medium’:; ‘high’:>150uV.  Persistence for periodic/rhythmic patterns (% of epoch) ‘rare’:<1%; ‘occasional’:1-10%; ‘frequent’:10-50%; ‘abundant’:50-90%; ‘continuous’:>90%.  Persistence for sporadic discharges: ‘rare’:<1/hr; ‘occasional’:1/min-1/hr; ‘frequent’:>1/min; ‘abundant’:>1/10 sec.  RPP=rhythmic and periodic patterns; GRDA=generalized rhythmic delta activity; FIRDA=frontal intermittent GRDA; LRDA=lateralized rhythmic delta activity; TIRDA=temporal intermittent rhythmic delta activity;  LPD=PLED=lateralized periodic discharges; GPD=generalized periodic discharges; BIPDs =bilateral independent periodic discharges; Mf=multifocal; SIRPDs=stimulus induced rhythmic, periodic, or ictal appearing discharges; BIRDs=brief potentially ictal rhythmic discharges >4 Hz, lasting .5-10s; PFA (paroxysmal bursts >13 Hz or =8 Hz <10s).  Modifiers: +F=with fast component; +S=with spike component; +R=with rhythmic component.  S-B=burst suppression pattern.  Max=maximal. N1-drowsy; N2-stage II sleep; N3-slow wave sleep. SSS/BETS=small sharp spikes/benign epileptiform transients of sleep. HV=hyperventilation; PS=photic stimulation]]]    Daily EEG Visual Analysis    FINDINGS:      Background:  The background is continuous and symmetric.  The predominant background consists of diffuse polymorphic delta with intermixed theta. There is no posterior dominant rhythm.     Background Slowing:  Generalized slowing: As above  Focal slowing: None    State Changes:   Drowsiness in noted with further slowing of the background  Stage II sleep architecture not attained.     Interictal Findings:  Generalized periodic discharges with bifrontal predominance, occasionally with triphasic morphology, at ~1 to 1.5 Hz, reach upto 2hz with arousal and stimulation with no clear evolution.     Electrographic and Electroclinical seizures:  No clear seizures observed.    Other Clinical Events:  One push button at 2239 - few seconds prior to the push button event, patient was noted to be coughing over the vent. EEG with ongoing GPDs at above.     Activation Procedures:   Hyperventilation is not performed.    Photic stimulation is not performed.    Artifacts:  Intermittent myogenic and movement artifacts are present.    EKG:  Single-lead EKG shows regular rhythm.    EEG Classification / Summary:  Abnormal video-EEG in a lethargic patient.  -GPDs, at times with triphasic morphology with fluctuating frequency (between 1 to 2hz)  -Moderate diffuse background slowing  - One push button at 2239 - for coughing over the vent? with electrographic seizure on EEG.      Clinical Impression:  -These findings typically suggests severe metabolic encephalopathy; however, in certain clinical scenarios GPD may signify elevated risk of seizures especially with frequency >2hz.     This is fellow preliminary read, pending attending review.    [facility] [place] EEG Reading Room Ph#: [phone]  Epilepsy Answering Service after 5PM and before 8:30AM: Ph#: [phone]     FRANCHESCA Iniguez  Epilepsy Fellow   SHAIK DONOHUE [facility]-[id number]     Study Date: 2/4/24 0800 - 2/5/24 0800  Duration: 24 hr  --------------------------------------------------------------------------------------------------  History:  CC/ HPI Patient is a 89y old  Male who presents with a chief complaint of COVID19, Chest pain (02 Feb 2024 07:32)    MEDICATIONS  (STANDING):  albuterol/ipratropium for Nebulization 3 milliLiter(s) Nebulizer every 6 hours  artificial  tears Solution 1 Drop(s) Left EYE four times a day  aspirin Suppository 300 milliGRAM(s) Rectal daily  chlorhexidine 2% Cloths 1 Application(s) Topical daily  dextrose 5%. 1000 milliLiter(s) (100 mL/Hr) IV Continuous <Continuous>  dextrose 50% Injectable 25 Gram(s) IV Push once  dextrose 50% Injectable 25 Gram(s) IV Push once  dextrose 50% Injectable 12.5 Gram(s) IV Push once  dextrose Oral Gel 15 Gram(s) Oral once  escitalopram 10 milliGRAM(s) Oral daily  glucagon  Injectable 1 milliGRAM(s) IntraMuscular once  insulin lispro (ADMELOG) corrective regimen sliding scale   SubCutaneous every 6 hours  levETIRAcetam  IVPB 500 milliGRAM(s) IV Intermittent every 12 hours  lidocaine   4% Patch 1 Patch Transdermal every 24 hours  pantoprazole  Injectable 40 milliGRAM(s) IV Push every 12 hours  petrolatum Ophthalmic Ointment 1 Application(s) Left EYE at bedtime  sodium chloride 3%  Inhalation 4 milliLiter(s) Inhalation every 6 hours  sucralfate suspension 1 Gram(s) Enteral Tube two times a day  valproate sodium  IVPB 500 milliGRAM(s) IV Intermittent every 8 hours    --------------------------------------------------------------------------------------------------  Study Interpretation:    [[[Abbreviation Key:  PDR=alpha rhythm/posterior dominant rhythm. A-P=anterior posterior.  Amplitude: ‘very low’:<20; ‘low’:20-49; ‘medium’:; ‘high’:>150uV.  Persistence for periodic/rhythmic patterns (% of epoch) ‘rare’:<1%; ‘occasional’:1-10%; ‘frequent’:10-50%; ‘abundant’:50-90%; ‘continuous’:>90%.  Persistence for sporadic discharges: ‘rare’:<1/hr; ‘occasional’:1/min-1/hr; ‘frequent’:>1/min; ‘abundant’:>1/10 sec.  RPP=rhythmic and periodic patterns; GRDA=generalized rhythmic delta activity; FIRDA=frontal intermittent GRDA; LRDA=lateralized rhythmic delta activity; TIRDA=temporal intermittent rhythmic delta activity;  LPD=PLED=lateralized periodic discharges; GPD=generalized periodic discharges; BIPDs =bilateral independent periodic discharges; Mf=multifocal; SIRPDs=stimulus induced rhythmic, periodic, or ictal appearing discharges; BIRDs=brief potentially ictal rhythmic discharges >4 Hz, lasting .5-10s; PFA (paroxysmal bursts >13 Hz or =8 Hz <10s).  Modifiers: +F=with fast component; +S=with spike component; +R=with rhythmic component.  S-B=burst suppression pattern.  Max=maximal. N1-drowsy; N2-stage II sleep; N3-slow wave sleep. SSS/BETS=small sharp spikes/benign epileptiform transients of sleep. HV=hyperventilation; PS=photic stimulation]]]    Daily EEG Visual Analysis    FINDINGS:      Background:  The background is continuous and symmetric.  The predominant background consists of diffuse polymorphic delta with intermittent theta. There is no posterior dominant rhythm.     Background Slowing:  Generalized slowing: As above  Focal slowing: None    State Changes:   Drowsiness characterized by decrease in prevalence of GPDs.  Stage II sleep architecture not attained.     Interictal Findings:  Abundant generalized periodic discharges (GPDs, frontally predominant sharp waves), occasionally with triphasic morphology, at ~1 to 1.5 Hz, reaching up to 2 Hz with arousal and stimulation with no clear evolution. Patient at times makes spontaneous movements during GPDs.    Electrographic and Electroclinical seizures:  No clear seizures observed.    Other Clinical Events:  One push-button event at 22:39 - shortly prior to the button press, patient exhibits some movements that appear to be coughing. There are ongoing ~1.5-2-Hz GPDs as above.    Activation Procedures:   Hyperventilation is not performed.    Photic stimulation is not performed.    Artifacts:  Intermittent myogenic and movement artifacts are present.    EKG:  Single-lead EKG shows regular rhythm.    EEG Classification / Summary:  Abnormal video-EEG in a lethargic patient.  -Abundant GPDs at 1-2 Hz, at times with triphasic morphology, most prevalent with stimulation or arousal  -Moderate diffuse slowing  -One push-button event at 22:39. Patient appears to be coughing    Clinical Impression:  -GPDs with triphasic morphology can be seen in toxic-metabolic encephalopathies and indicate risk of generalized seizures.  -Moderate diffuse cerebral dysfunction is nonspecific in etiology.       ------------------------------------------------------------------------------------------  FRANCHESCA Iniguez  Epilepsy Fellow    Karyn Escobar MD  Attending Physician, [facility]     [facility] EEG Reading Room Ph#: [phone]  Epilepsy Answering Service after 5PM and before 8:30AM: Ph#: [phone]

## 2024-02-05 NOTE — PROGRESS NOTE ADULT - SUBJECTIVE AND OBJECTIVE BOX
***************************************************************  Candelario (Renee) Shantel PGY2  MICU  ***************************************************************    SHAIK CHARANJIT  89y  MRN: [id number]    Patient is a 89y old  Male who presents with a chief complaint of COVID19, Chest pain (04 Feb 2024 16:01)      Subjective: no events ON. Denies fever, CP, SOB, abn pain, N/V, dysuria. Tolerating diet.      MEDICATIONS  (STANDING):  albuterol/ipratropium for Nebulization 3 milliLiter(s) Nebulizer every 6 hours  artificial  tears Solution 1 Drop(s) Left EYE four times a day  aspirin Suppository 300 milliGRAM(s) Rectal daily  chlorhexidine 0.12% Liquid 15 milliLiter(s) Oral Mucosa every 12 hours  chlorhexidine 2% Cloths 1 Application(s) Topical daily  dexMEDEtomidine Infusion 0.2 MICROgram(s)/kG/Hr (3.97 mL/Hr) IV Continuous <Continuous>  dextrose 5%. 1000 milliLiter(s) (100 mL/Hr) IV Continuous <Continuous>  dextrose 5%. 1000 milliLiter(s) (50 mL/Hr) IV Continuous <Continuous>  dextrose 50% Injectable 25 Gram(s) IV Push once  dextrose 50% Injectable 25 Gram(s) IV Push once  dextrose 50% Injectable 12.5 Gram(s) IV Push once  escitalopram 10 milliGRAM(s) Oral daily  glucagon  Injectable 1 milliGRAM(s) IntraMuscular once  heparin   Injectable 5000 Unit(s) SubCutaneous every 8 hours  insulin glargine Injectable (LANTUS) 15 Unit(s) SubCutaneous <User Schedule>  insulin lispro (ADMELOG) corrective regimen sliding scale   SubCutaneous every 6 hours  levETIRAcetam  IVPB 500 milliGRAM(s) IV Intermittent every 12 hours  lidocaine   4% Patch 1 Patch Transdermal every 24 hours  norepinephrine Infusion 0.05 MICROgram(s)/kG/Min (3.72 mL/Hr) IV Continuous <Continuous>  pantoprazole  Injectable 40 milliGRAM(s) IV Push every 12 hours  petrolatum Ophthalmic Ointment 1 Application(s) Left EYE at bedtime  sodium chloride 3%  Inhalation 4 milliLiter(s) Inhalation every 6 hours  sucralfate suspension 1 Gram(s) Enteral Tube two times a day    MEDICATIONS  (PRN):  dextrose Oral Gel 15 Gram(s) Oral once PRN Blood Glucose LESS THAN 70 milliGRAM(s)/deciliter      Objective:    Vitals: Vital Signs Last 24 Hrs  T(C): 37.5 (02-05-24 @ 04:00), Max: 37.5 (02-05-24 @ 04:00)  T(F): 99.5 (02-05-24 @ 04:00), Max: 99.5 (02-05-24 @ 04:00)  HR: 94 (02-05-24 @ 06:00) (81 - 96)  BP: 124/47 (02-05-24 @ 06:00) (105/46 - 147/56)  BP(mean): 66 (02-05-24 @ 06:00) (60 - 81)  RR: 17 (02-05-24 @ 06:00) (12 - 19)  SpO2: 100% (02-05-24 @ 06:00) (99% - 100%)            I&O's Summary    04 Feb 2024 07:01  -  05 Feb 2024 07:00  --------------------------------------------------------  IN: 2621.7 mL / OUT: 5195 mL / NET: -2573.3 mL        PHYSICAL EXAM:  GENERAL: NAD  HEAD:  Atraumatic, Normocephalic  EYES: EOMI, conjunctiva and sclera clear  CHEST/LUNG: Clear to auscultation bilaterally; No rales, rhonchi, wheezing, or rubs  HEART: Regular rate and rhythm; No murmurs, rubs, or gallops  ABDOMEN: Soft, Nontender, Nondistended;   SKIN: No rashes or lesions  NERVOUS SYSTEM:  Alert & Oriented X3, no focal deficits    LABS:  02-05    139  |  99  |  34<H>  ----------------------------<  140<H>  3.9   |  30  |  1.73<H>  02-04    139  |  99  |  33<H>  ----------------------------<  241<H>  3.6   |  30  |  1.75<H>  02-04    144  |  103  |  32<H>  ----------------------------<  148<H>  3.7   |  31  |  1.87<H>    Ca    7.9<L>      05 Feb 2024 01:58  Ca    7.8<L>      04 Feb 2024 12:57  Ca    8.0<L>      04 Feb 2024 02:46  Phos  3.5     02-05  Mg     1.90     02-05    TPro  6.2  /  Alb  2.2<L>  /  TBili  0.2  /  DBili  x   /  AST  24  /  ALT  29  /  AlkPhos  110  02-05  TPro  6.0  /  Alb  2.2<L>  /  TBili  0.2  /  DBili  x   /  AST  29  /  ALT  37  /  AlkPhos  110  02-04  TPro  5.8<L>  /  Alb  2.3<L>  /  TBili  0.3  /  DBili  x   /  AST  42<H>  /  ALT  46<H>  /  AlkPhos  97  02-03      PT/INR - ( 05 Feb 2024 01:58 )   PT: 12.1 sec;   INR: 1.07 ratio         PTT - ( 05 Feb 2024 01:58 )  PTT:32.6 sec              Urinalysis Basic - ( 05 Feb 2024 01:58 )    Color: x / Appearance: x / SG: x / pH: x  Gluc: 140 mg/dL / Ketone: x  / Bili: x / Urobili: x   Blood: x / Protein: x / Nitrite: x   Leuk Esterase: x / RBC: x / WBC x   Sq Epi: x / Non Sq Epi: x / Bacteria: x      ABG - ( 04 Feb 2024 12:57 )  pH, Arterial: 7.55  pH, Blood: x     /  pCO2: 42    /  pO2: 87    / HCO3: 37    / Base Excess: 13.1  /  SaO2: 98.2                                    7.7    8.14  )-----------( 357      ( 05 Feb 2024 01:58 )             24.1                         7.4    7.13  )-----------( 369      ( 04 Feb 2024 02:46 )             24.0                         7.1    8.13  )-----------( 359      ( 03 Feb 2024 10:40 )             22.2     CAPILLARY BLOOD GLUCOSE      POCT Blood Glucose.: 182 mg/dL (05 Feb 2024 06:23)  POCT Blood Glucose.: 148 mg/dL (04 Feb 2024 23:46)  POCT Blood Glucose.: 229 mg/dL (04 Feb 2024 17:48)  POCT Blood Glucose.: 232 mg/dL (04 Feb 2024 11:35)      RADIOLOGY & ADDITIONAL TESTS:    Imaging Personally Reviewed:  [x ] YES  [ ] NO    Consultants involved in case:   Consultant(s) Notes Reviewed:  [ x] YES  [ ] NO:   Care Discussed with Consultants/Other Providers [x ] YES  [ ] NO         ***************************************************************  Candelario (Renee) Shantel PGY2  MICU  ***************************************************************    SHAIK CHARANJIT  89y  MRN: [id number]    Patient is a 89y old  Male who presents with a chief complaint of COVID19, Chest pain (04 Feb 2024 16:01)      Subjective: NAEO. Moving limbs, unresponsive does not follow cmds, does not appear to be in any pain.     MEDICATIONS  (STANDING):  albuterol/ipratropium for Nebulization 3 milliLiter(s) Nebulizer every 6 hours  artificial  tears Solution 1 Drop(s) Left EYE four times a day  aspirin Suppository 300 milliGRAM(s) Rectal daily  chlorhexidine 0.12% Liquid 15 milliLiter(s) Oral Mucosa every 12 hours  chlorhexidine 2% Cloths 1 Application(s) Topical daily  dexMEDEtomidine Infusion 0.2 MICROgram(s)/kG/Hr (3.97 mL/Hr) IV Continuous <Continuous>  dextrose 5%. 1000 milliLiter(s) (100 mL/Hr) IV Continuous <Continuous>  dextrose 5%. 1000 milliLiter(s) (50 mL/Hr) IV Continuous <Continuous>  dextrose 50% Injectable 25 Gram(s) IV Push once  dextrose 50% Injectable 25 Gram(s) IV Push once  dextrose 50% Injectable 12.5 Gram(s) IV Push once  escitalopram 10 milliGRAM(s) Oral daily  glucagon  Injectable 1 milliGRAM(s) IntraMuscular once  heparin   Injectable 5000 Unit(s) SubCutaneous every 8 hours  insulin glargine Injectable (LANTUS) 15 Unit(s) SubCutaneous <User Schedule>  insulin lispro (ADMELOG) corrective regimen sliding scale   SubCutaneous every 6 hours  levETIRAcetam  IVPB 500 milliGRAM(s) IV Intermittent every 12 hours  lidocaine   4% Patch 1 Patch Transdermal every 24 hours  norepinephrine Infusion 0.05 MICROgram(s)/kG/Min (3.72 mL/Hr) IV Continuous <Continuous>  pantoprazole  Injectable 40 milliGRAM(s) IV Push every 12 hours  petrolatum Ophthalmic Ointment 1 Application(s) Left EYE at bedtime  sodium chloride 3%  Inhalation 4 milliLiter(s) Inhalation every 6 hours  sucralfate suspension 1 Gram(s) Enteral Tube two times a day    MEDICATIONS  (PRN):  dextrose Oral Gel 15 Gram(s) Oral once PRN Blood Glucose LESS THAN 70 milliGRAM(s)/deciliter      Objective:    Vitals: Vital Signs Last 24 Hrs  T(C): 37.5 (02-05-24 @ 04:00), Max: 37.5 (02-05-24 @ 04:00)  T(F): 99.5 (02-05-24 @ 04:00), Max: 99.5 (02-05-24 @ 04:00)  HR: 94 (02-05-24 @ 06:00) (81 - 96)  BP: 124/47 (02-05-24 @ 06:00) (105/46 - 147/56)  BP(mean): 66 (02-05-24 @ 06:00) (60 - 81)  RR: 17 (02-05-24 @ 06:00) (12 - 19)  SpO2: 100% (02-05-24 @ 06:00) (99% - 100%)            I&O's Summary    04 Feb 2024 07:01  -  05 Feb 2024 07:00  --------------------------------------------------------  IN: 2621.7 mL / OUT: 5195 mL / NET: -2573.3 mL        PHYSICAL EXAM:  GENERAL: NAD  HEAD:  Atraumatic, Normocephalic  EYES: EOMI, conjunctiva and sclera clear  CHEST/LUNG: Ventilator sounds  HEART: Regular rate and rhythm; No murmurs, rubs, or gallops  ABDOMEN: Soft, Nontender, Nondistended;   SKIN: No rashes or lesions  NERVOUS SYSTEM:  Alert & Oriented X0, unresponsive to sternal rub, does not follow commands, moves limbs spontaneously.     LABS:  02-05    139  |  99  |  34<H>  ----------------------------<  140<H>  3.9   |  30  |  1.73<H>  02-04    139  |  99  |  33<H>  ----------------------------<  241<H>  3.6   |  30  |  1.75<H>  02-04    144  |  103  |  32<H>  ----------------------------<  148<H>  3.7   |  31  |  1.87<H>    Ca    7.9<L>      05 Feb 2024 01:58  Ca    7.8<L>      04 Feb 2024 12:57  Ca    8.0<L>      04 Feb 2024 02:46  Phos  3.5     02-05  Mg     1.90     02-05    TPro  6.2  /  Alb  2.2<L>  /  TBili  0.2  /  DBili  x   /  AST  24  /  ALT  29  /  AlkPhos  110  02-05  TPro  6.0  /  Alb  2.2<L>  /  TBili  0.2  /  DBili  x   /  AST  29  /  ALT  37  /  AlkPhos  110  02-04  TPro  5.8<L>  /  Alb  2.3<L>  /  TBili  0.3  /  DBili  x   /  AST  42<H>  /  ALT  46<H>  /  AlkPhos  97  02-03      PT/INR - ( 05 Feb 2024 01:58 )   PT: 12.1 sec;   INR: 1.07 ratio         PTT - ( 05 Feb 2024 01:58 )  PTT:32.6 sec              Urinalysis Basic - ( 05 Feb 2024 01:58 )    Color: x / Appearance: x / SG: x / pH: x  Gluc: 140 mg/dL / Ketone: x  / Bili: x / Urobili: x   Blood: x / Protein: x / Nitrite: x   Leuk Esterase: x / RBC: x / WBC x   Sq Epi: x / Non Sq Epi: x / Bacteria: x      ABG - ( 04 Feb 2024 12:57 )  pH, Arterial: 7.55  pH, Blood: x     /  pCO2: 42    /  pO2: 87    / HCO3: 37    / Base Excess: 13.1  /  SaO2: 98.2                                    7.7    8.14  )-----------( 357      ( 05 Feb 2024 01:58 )             24.1                         7.4    7.13  )-----------( 369      ( 04 Feb 2024 02:46 )             24.0                         7.1    8.13  )-----------( 359      ( 03 Feb 2024 10:40 )             22.2     CAPILLARY BLOOD GLUCOSE      POCT Blood Glucose.: 182 mg/dL (05 Feb 2024 06:23)  POCT Blood Glucose.: 148 mg/dL (04 Feb 2024 23:46)  POCT Blood Glucose.: 229 mg/dL (04 Feb 2024 17:48)  POCT Blood Glucose.: 232 mg/dL (04 Feb 2024 11:35)      RADIOLOGY & ADDITIONAL TESTS:    Imaging Personally Reviewed:  [x ] YES  [ ] NO    Consultants involved in case:   Consultant(s) Notes Reviewed:  [ x] YES  [ ] NO:   Care Discussed with Consultants/Other Providers [x ] YES  [ ] NO

## 2024-02-05 NOTE — PROGRESS NOTE ADULT - ASSESSMENT
on vent  afebrile      albuterol/ipratropium for Nebulization 3 milliLiter(s) Nebulizer every 6 hours  artificial  tears Solution 1 Drop(s) Left EYE four times a day  aspirin Suppository 300 milliGRAM(s) Rectal daily  buMETAnide Injectable 2 milliGRAM(s) IV Push every 12 hours  chlorhexidine 0.12% Liquid 15 milliLiter(s) Oral Mucosa every 12 hours  chlorhexidine 2% Cloths 1 Application(s) Topical daily  dexMEDEtomidine Infusion 0.2 MICROgram(s)/kG/Hr IV Continuous <Continuous>  dextrose 5%. 1000 milliLiter(s) IV Continuous <Continuous>  dextrose 5%. 1000 milliLiter(s) IV Continuous <Continuous>  dextrose 50% Injectable 25 Gram(s) IV Push once  dextrose 50% Injectable 25 Gram(s) IV Push once  dextrose 50% Injectable 12.5 Gram(s) IV Push once  dextrose Oral Gel 15 Gram(s) Oral once PRN  escitalopram 10 milliGRAM(s) Oral daily  glucagon  Injectable 1 milliGRAM(s) IntraMuscular once  heparin   Injectable 5000 Unit(s) SubCutaneous every 8 hours  insulin glargine Injectable (LANTUS) 15 Unit(s) SubCutaneous <User Schedule>  insulin lispro (ADMELOG) corrective regimen sliding scale   SubCutaneous every 6 hours  levETIRAcetam  IVPB 500 milliGRAM(s) IV Intermittent every 12 hours  lidocaine   4% Patch 1 Patch Transdermal every 24 hours  norepinephrine Infusion 0.05 MICROgram(s)/kG/Min IV Continuous <Continuous>  pantoprazole  Injectable 40 milliGRAM(s) IV Push every 12 hours  petrolatum Ophthalmic Ointment 1 Application(s) Left EYE at bedtime  propofol Infusion 30.055 MICROgram(s)/kG/Min IV Continuous <Continuous>  sodium chloride 3%  Inhalation 4 milliLiter(s) Inhalation every 6 hours  sucralfate suspension 1 Gram(s) Enteral Tube two times a day  valproate sodium  IVPB 600 milliGRAM(s) IV Intermittent every 6 hours      VITAL:  T(C): , Max: 37.2 (02-04-24 @ 08:00)  T(F): , Max: 98.9 (02-04-24 @ 08:00)  HR: 88 (02-04-24 @ 08:00)  BP: 138/60 (02-04-24 @ 08:00)  BP(mean): 79 (02-04-24 @ 08:00)  RR: 14 (02-04-24 @ 08:00)  SpO2: 100% (02-04-24 @ 08:00)  Wt(kg): --    02-03-24 @ 07:01  -  02-04-24 @ 07:00  --------------------------------------------------------  IN: 3227.8 mL / OUT: 4555 mL / NET: -1327.2 mL    02-04-24 @ 07:01  -  02-04-24 @ 08:30  --------------------------------------------------------  IN: 110.8 mL / OUT: 400 mL / NET: -289.2 mL        PHYSICAL EXAM:  Constitutional: no distress   HEENT: on bipap  Neck: No LAD, No JVD  Respiratory: diminished b/l  Cardiovascular: S1 and S2  Gastrointestinal: BS+, soft, NT/ND  Extremities: + l/e edema  Neurological: UTO  : + Moss  Skin: No rashes     LABS:                          7.4    7.13  )-----------( 369      ( 04 Feb 2024 02:46 )             24.0     Na(144)/K(3.7)/Cl(103)/HCO3(31)/BUN(32)/Cr(1.87)Glu(148)/Ca(8.0)/Mg(2.10)/PO4(3.4)    02-04 @ 02:46  Na(147)/K(3.7)/Cl(107)/HCO3(30)/BUN(35)/Cr(1.84)Glu(223)/Ca(8.1)/Mg(2.10)/PO4(2.7)    02-03 @ 10:40  Na(151)/K(3.2)/Cl(110)/HCO3(30)/BUN(35)/Cr(1.89)Glu(94)/Ca(8.2)/Mg(2.30)/PO4(2.2)    02-03 @ 04:24  Na(146)/K(4.0)/Cl(108)/HCO3(25)/BUN(37)/Cr(1.86)Glu(215)/Ca(8.3)/Mg(2.50)/PO4(3.7)    02-02 @ 18:21  Na(148)/K(4.0)/Cl(108)/HCO3(30)/BUN(39)/Cr(1.87)Glu(327)/Ca(8.4)/Mg(--)/PO4(--)    02-02 @ 11:59  Na(148)/K(4.2)/Cl(108)/HCO3(29)/BUN(39)/Cr(1.95)Glu(320)/Ca(8.5)/Mg(2.60)/PO4(4.2)    02-02 @ 06:19  Na(151)/K(4.2)/Cl(110)/HCO3(27)/BUN(39)/Cr(1.88)Glu(258)/Ca(8.3)/Mg(2.50)/PO4(4.5)    02-01 @ 21:34  Na(153)/K(4.5)/Cl(111)/HCO3(27)/BUN(40)/Cr(1.87)Glu(216)/Ca(8.4)/Mg(--)/PO4(--)    02-01 @ 16:29  Na(152)/K(4.7)/Cl(111)/HCO3(28)/BUN(42)/Cr(1.87)Glu(215)/Ca(8.5)/Mg(2.60)/PO4(4.7)    02-01 @ 12:05    Urinalysis Basic - ( 04 Feb 2024 02:46 )    Color: x / Appearance: x / SG: x / pH: x  Gluc: 148 mg/dL / Ketone: x  / Bili: x / Urobili: x   Blood: x / Protein: x / Nitrite: x   Leuk Esterase: x / RBC: x / WBC x   Sq Epi: x / Non Sq Epi: x / Bacteria: x            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  Can give another  dose potassium chloride 40mEq x 1 dose  to keep K+ >4  Hypertensive urgency -resolved -BP acceptable at present  Pulm - resp failure - on bipap    s/p EEG  today - eval noted  Hypernatremia - Na+ much improved  on free water  as prescribed via NGT    1 Renal - scr stable at present .  no objection to lasix 40mg IV once PRN, in case of distress   continue to trend phos level daily   4 CV - BP acceptable at present  5 Vasc - s/p SMA stent placement;   6 Sx - s/p HIDA + for acute asa, medical management  7 GI - s/p EGD clipping of bleeding duodenal ulcers   8 Anemia- hgb improving, continue to trend H/H; suggest PRBC for Hgb <7.0; transfuse per primary team   9 Pulm -vent as per icu   10- ID -afebrile   11 Glycemic control as able         Elsa Nunez  [facility]  Office: [phone]      Larry Cohen NP-BC  [employer]  [phone]

## 2024-02-05 NOTE — PROGRESS NOTE ADULT - ATTENDING COMMENTS
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 Course c/b acute cholecystitis s/p perc asa 1/26 by IR    #Acute hypoxemic/hypercapnic respiratory failure  #MSSA Pneumonia  #Dysphagia  #R pleural effusion  #Acute cholecystitis  #Encephalopathy  #Afib - New, intermittent  #SMA stent   #Petechial hemorrhages.   - Frequent aspiration noted clinically. 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 Course c/b acute cholecystitis s/p perc asa 1/26 by IR    #Acute hypoxemic/hypercapnic respiratory failure  #MSSA Pneumonia  #Dysphagia  #R pleural effusion  #Acute cholecystitis  #Encephalopathy  #Afib - New, intermittent  #SMA stent   #Petechial hemorrhages.   - Frequent aspiration noted clinically and on CT scans. now reintubated, unable to clear secretions. 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 Course c/b acute cholecystitis s/p perc asa 1/26 by IR    #Acute hypoxemic/hypercapnic respiratory failure  #Multifocal Pneumonia  # Shock on pressors  #Dysphagia  #Acute cholecystitis  #Encephalopathy  #Afib - New, intermittent  #SMA stent   #Petechial hemorrhages.   - Frequent aspiration noted clinically and on CT scans. now reintubated, unable to clear secretions. 2/2 to poor cough and mental status.   - EEG without sz, neuro following c/w keppra 500. C/W eeg for now. Pending MRI. Will need to ensure safety with MRI as patient has PPM  - S/P course of antibiotics for MSSA pna as well as aspiration. Continue to monitor off abx.   - C/W vent, adjust based on gas, s/p diamox yesterday, will trend based on blood gas  - S/P IR drainage of biliary tube, monitor output.   - new AFib, now on full ac at this time. In the setting of critical ilness, currently being monitored on tele.   - C/W ASA, Brilinta on hold given hx of stent. Per vascular can hold temporarily. Will restart soon as patient is trached/PEG  - Seen by optho, continue to monitor  - oropharyngeal dysphagia will need peg given repeated aspiration  - shock on pressors, wean as tolerated. Likely 2/2 to vasoplegia.   - DVT ppx - SQH  - Dispo- family agreeable with trach and peg. Will consult IP for trach and GI for PEG.

## 2024-02-05 NOTE — PROGRESS NOTE ADULT - SUBJECTIVE AND OBJECTIVE BOX
Date of Service: 02-05-24 @ 08:36    Patient is a 89y old  Male who presents with a chief complaint of COVID19, Chest pain (05 Feb 2024 07:16)      Any change in ROS: seems same:  intubated and sedated    MEDICATIONS  (STANDING):  albuterol/ipratropium for Nebulization 3 milliLiter(s) Nebulizer every 6 hours  artificial  tears Solution 1 Drop(s) Left EYE four times a day  aspirin Suppository 300 milliGRAM(s) Rectal daily  chlorhexidine 0.12% Liquid 15 milliLiter(s) Oral Mucosa every 12 hours  chlorhexidine 2% Cloths 1 Application(s) Topical daily  dexMEDEtomidine Infusion 0.2 MICROgram(s)/kG/Hr (3.97 mL/Hr) IV Continuous <Continuous>  dextrose 5%. 1000 milliLiter(s) (100 mL/Hr) IV Continuous <Continuous>  dextrose 5%. 1000 milliLiter(s) (50 mL/Hr) IV Continuous <Continuous>  dextrose 50% Injectable 25 Gram(s) IV Push once  dextrose 50% Injectable 25 Gram(s) IV Push once  dextrose 50% Injectable 12.5 Gram(s) IV Push once  escitalopram 10 milliGRAM(s) Oral daily  glucagon  Injectable 1 milliGRAM(s) IntraMuscular once  heparin   Injectable 5000 Unit(s) SubCutaneous every 8 hours  insulin glargine Injectable (LANTUS) 15 Unit(s) SubCutaneous <User Schedule>  insulin lispro (ADMELOG) corrective regimen sliding scale   SubCutaneous every 6 hours  levETIRAcetam  IVPB 500 milliGRAM(s) IV Intermittent every 12 hours  lidocaine   4% Patch 1 Patch Transdermal every 24 hours  norepinephrine Infusion 0.05 MICROgram(s)/kG/Min (3.72 mL/Hr) IV Continuous <Continuous>  pantoprazole  Injectable 40 milliGRAM(s) IV Push every 12 hours  petrolatum Ophthalmic Ointment 1 Application(s) Left EYE at bedtime  sodium chloride 3%  Inhalation 4 milliLiter(s) Inhalation every 6 hours  sucralfate suspension 1 Gram(s) Enteral Tube two times a day    MEDICATIONS  (PRN):  dextrose Oral Gel 15 Gram(s) Oral once PRN Blood Glucose LESS THAN 70 milliGRAM(s)/deciliter    Vital Signs Last 24 Hrs  T(C): 37.7 (05 Feb 2024 08:00), Max: 37.7 (05 Feb 2024 08:00)  T(F): 99.9 (05 Feb 2024 08:00), Max: 99.9 (05 Feb 2024 08:00)  HR: 92 (05 Feb 2024 08:00) (81 - 96)  BP: 110/45 (05 Feb 2024 08:00) (105/46 - 147/56)  BP(mean): 60 (05 Feb 2024 08:00) (60 - 81)  RR: 15 (05 Feb 2024 08:00) (12 - 20)  SpO2: 100% (05 Feb 2024 08:00) (94% - 100%)    Parameters below as of 05 Feb 2024 08:00  Patient On (Oxygen Delivery Method): ventilator    O2 Concentration (%): 30  Mode: AC/ CMV (Assist Control/ Continuous Mandatory Ventilation)  RR (machine): 12  TV (machine): 450  FiO2: 30  PEEP: 5  ITime: 0.88  MAP: 12  PIP: 29    I&O's Summary    04 Feb 2024 07:01  -  05 Feb 2024 07:00  --------------------------------------------------------  IN: 2621.7 mL / OUT: 5195 mL / NET: -2573.3 mL    05 Feb 2024 07:01  -  05 Feb 2024 08:36  --------------------------------------------------------  IN: 45.5 mL / OUT: 185 mL / NET: -139.5 mL          Physical Exam:   GENERAL: NAD, well-groomed, well-developed  HEENT: JAVAD/   Atraumatic, Normocephalic  ENMT: No tonsillar erythema, exudates, or enlargement; Moist mucous membranes, Good dentition, No lesions  NECK: Supple, No JVD, Normal thyroid  CHEST/LUNG: Clear to auscultaion- no wheezing  CVS: Regular rate and rhythm; No murmurs, rubs, or gallops  GI: : Soft, Nontender, Nondistended; Bowel sounds present  NERVOUS SYSTEM:  intubated and sedated  EXTREMITIES: - edema  LYMPH: No lymphadenopathy noted  SKIN: No rashes or lesions  ENDOCRINOLOGY: No Thyromegaly  PSYCH: intubated     Labs:  ABG - ( 04 Feb 2024 12:57 )  pH, Arterial: 7.55  pH, Blood: x     /  pCO2: 42    /  pO2: 87    / HCO3: 37    / Base Excess: 13.1  /  SaO2: 98.2            28, 31                            7.7    8.14  )-----------( 357      ( 05 Feb 2024 01:58 )             24.1                         7.4    7.13  )-----------( 369      ( 04 Feb 2024 02:46 )             24.0                         7.1    8.13  )-----------( 359      ( 03 Feb 2024 10:40 )             22.2                         7.1    8.99  )-----------( 377      ( 03 Feb 2024 04:24 )             22.5                         7.1    8.63  )-----------( 398      ( 02 Feb 2024 18:21 )             24.2                         7.6    9.20  )-----------( 383      ( 02 Feb 2024 06:19 )             24.7     02-05    139  |  99  |  34<H>  ----------------------------<  140<H>  3.9   |  30  |  1.73<H>  02-04    139  |  99  |  33<H>  ----------------------------<  241<H>  3.6   |  30  |  1.75<H>  02-04    144  |  103  |  32<H>  ----------------------------<  148<H>  3.7   |  31  |  1.87<H>  02-03    147<H>  |  107  |  35<H>  ----------------------------<  223<H>  3.7   |  30  |  1.84<H>  02-03    151<H>  |  110<H>  |  35<H>  ----------------------------<  94  3.2<L>   |  30  |  1.89<H>  02-02    146<H>  |  108<H>  |  37<H>  ----------------------------<  215<H>  4.0   |  25  |  1.86<H>  02-02    148<H>  |  108<H>  |  39<H>  ----------------------------<  327<H>  4.0   |  30  |  1.87<H>  02-02    148<H>  |  108<H>  |  39<H>  ----------------------------<  320<H>  4.2   |  29  |  1.95<H>  02-01    151<H>  |  110<H>  |  39<H>  ----------------------------<  258<H>  4.2   |  27  |  1.88<H>  02-01    153<H>  |  111<H>  |  40<H>  ----------------------------<  216<H>  4.5   |  27  |  1.87<H>    Ca    7.9<L>      05 Feb 2024 01:58  Ca    7.8<L>      04 Feb 2024 12:57  Ca    8.0<L>      04 Feb 2024 02:46  Ca    8.1<L>      03 Feb 2024 10:40  Phos  3.5     02-05  Phos  3.4     02-04  Phos  2.7     02-03  Mg     1.90     02-05  Mg     2.10     02-04  Mg     2.10     02-03    TPro  6.2  /  Alb  2.2<L>  /  TBili  0.2  /  DBili  x   /  AST  24  /  ALT  29  /  AlkPhos  110  02-05  TPro  6.0  /  Alb  2.2<L>  /  TBili  0.2  /  DBili  x   /  AST  29  /  ALT  37  /  AlkPhos  110  02-04  TPro  5.8<L>  /  Alb  2.3<L>  /  TBili  0.3  /  DBili  x   /  AST  42<H>  /  ALT  46<H>  /  AlkPhos  97  02-03  TPro  6.1  /  Alb  2.3<L>  /  TBili  0.3  /  DBili  x   /  AST  46<H>  /  ALT  52<H>  /  AlkPhos  71  02-03  TPro  6.3  /  Alb  2.4<L>  /  TBili  0.2  /  DBili  x   /  AST  56<H>  /  ALT  61<H>  /  AlkPhos  67  02-02  TPro  6.8  /  Alb  2.6<L>  /  TBili  0.3  /  DBili  x   /  AST  85<H>  /  ALT  70<H>  /  AlkPhos  72  02-02    CAPILLARY BLOOD GLUCOSE      POCT Blood Glucose.: 182 mg/dL (05 Feb 2024 06:23)  POCT Blood Glucose.: 148 mg/dL (04 Feb 2024 23:46)  POCT Blood Glucose.: 229 mg/dL (04 Feb 2024 17:48)  POCT Blood Glucose.: 232 mg/dL (04 Feb 2024 11:35)      LIVER FUNCTIONS - ( 05 Feb 2024 01:58 )  Alb: 2.2 g/dL / Pro: 6.2 g/dL / ALK PHOS: 110 U/L / ALT: 29 U/L / AST: 24 U/L / GGT: x           PT/INR - ( 05 Feb 2024 01:58 )   PT: 12.1 sec;   INR: 1.07 ratio         PTT - ( 05 Feb 2024 01:58 )  PTT:32.6 sec  Urinalysis Basic - ( 05 Feb 2024 01:58 )    Color: x / Appearance: x / SG: x / pH: x  Gluc: 140 mg/dL / Ketone: x  / Bili: x / Urobili: x   Blood: x / Protein: x / Nitrite: x   Leuk Esterase: x / RBC: x / WBC x   Sq Epi: x / Non Sq Epi: x / Bacteria: x      rad< from: Xray Chest 1 View- PORTABLE-Urgent (Xray Chest 1 View- PORTABLE-Urgent .) (02.02.24 @ 16:39) >    ACC: [id number] EXAM:  XR CHEST PORTABLE URGENT 1V   ORDERED BY: JASMEET PEREZ     PROCEDURE DATE:  02/02/2024          INTERPRETATION:  EXAMINATION: XR CHEST URGENT    CLINICAL INDICATION: sp intubation    TECHNIQUE: Single frontal, portable view of the chest was obtained.    COMPARISON: Chest x-ray 1/31/2024.    IMPRESSION:  Intubated with ETT tip at clavicular level. Enteric tube extends below   diaphragm into left hemiabdomen with tip extending beyond imaged field of   view. Partially visualized pigtail drainage tubes in right hemiabdomen   near inferior image margin.    Stable sternal wires left chest wall dual-lead pacemaker and prominent   appearing cardiac and mediastinal silhouettes.    Redemonstrated diffusely prominent hazy bilateral lung markings probably   reflecting moderate to severe congestion/edema however concomitant   superimposed infection in proper clinical context cannot be excluded.    Hazy lung bases and CP regions could be due to overlying soft tissues   and/or small pleural effusions. No pneumothorax.    --- End of Report ---          RADHA ROD MD; Resident Radiologist  This document has been electronically signed.  NORAH GREGORY MD; Attending Radiologist  This document has been electronically signed. Feb  3 2024 12:32PM    < end of copied text >        RECENT CULTURES:        RESPIRATORY CULTURES:          Studies  Chest X-RAY  CT SCAN Chest   Venous Dopplers: LE:   CT Abdomen  Others

## 2024-02-05 NOTE — PROGRESS NOTE ADULT - ASSESSMENT
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 Patient states that he has been having a dry cough for the past week, worsened over the past few days. Denies SOB with the cough, denies hemoptysis. Reports fevers at home to 101.5 with associated diaphoresis. Said that he has significant pinprick like b/l chest pain with his cough but denies any chest pain when not coughing or with ambulation. Also reports rhinorrhea and sore throat. Reports generalized weakness and poor appetite. States his daughter was visiting him over the week end last week and she was positive for COVID19. Patient tested positive for COVID19 2 days prior and was started on paxlovid as outpatient. Of note, family states that the patient has had worsening confusion at home over the past 6 months (becoming disoriented to time) but recently has been more confused, talking about seeing people that are not present.   On arrival to the ED his vitals were T 98 -> 99.2, P 80, /72, RR 18, ) sat 100% RA. His lab work showed leukocytosis with neutrophilia and bandemia, MABLE, mild hyponatremia, indeterminant but stable troponins, and elevated lactate to 2.3. His COVID19 swab was positive. His CXR showed bibasilar crackles.  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   -on valproate : neurology following   Covid / Resp failure/on 2 L of oxygen:  -he was treated for covid before:   -he has excessive secretions  -keep o2 sao2 above 90%  -add BD and mucomyst: ;  -add mucinex:   1/10: he seems to be doing  ok: cont mucomyst and bd   1/11 ?: add benzonatate and Robitussin prm : dc dornase  1/12: seems OK: no sob:  no cough : no phlegm : has gurgling sound in throat:  looks comfortable  1/14: he is on room air:  with good sao2:  finished covid rx:  cont current rx:  high risk for aspiration as he has gurgling secretions in throat   1/15: would do ct chest he seems to have increasing effusions:  his ct scan from last week of december:  has not much of effusions: however will try lasix : [facility]    1/16: doing  ok : no os:b has secretions still : change to percussion bed:  cont bd  1/17: seems stable:  no sob: on 3 L of oxygen : should add ATC lasix to me as he has formed new pleural effusions:  echo with mod AS   1/18; dw pts son : who is a neurologist:  he has secretions in chest with atelectasis and yovani effusions with increased secretions:  the son requests bronchoscopy:  I have explained the advantages and disadvantages of the bronch at this age and he might not be able to be extubated soon after procedure:  but they want to go ahead with it: IP called   He will remain art risk for recurrent aspiration and atelectasis even after the procedure and they understand that    1/19: FOR BRONCH TODAY  : AGAIN CONFIRMED WITH NEUROLOGISTS SON  1/20 : events noted:  was successful extubated after the procedure:  but then he desaturated with increasing secretions and ended up on high flow and adm to MICU : on recent chest xray has mod large effusion on rigth side:  I think it needs a tap:  would defer to [facility] MICU team   ; [facility] [place] cultures are still pending  1/21: dw fellow  consider tapping on rigth saide:  his PCP and son at bedside:  he is not obviously sob but still on 100%  high flow   1/22: he is doing poorly:  WBC increased:  bronch culturs with staph : nares with staph ? add vanco:  defer to MICU : in addition:  he has large effusion 0on rigth side: ? chest tube:  but brillinta needs to be stopped : [facility] attending  1/23: got intubated : sedated on vasopressors:  s/p brocnh : [facility] attending again  : possibly needs tap on rigth side   1/24: cont current rx:  defer to primary team  : s/p bronch   -cont current medications   1/28: seems same to me  : intubated and is on precedex:  cont current pulm care:  on antibiotics  1/29: on cpap trials today : for possible extubation;  son at bedside; cont antibiotics  1/30: seems to be doing ok : intubated and on precedex  : on cpap trials:  extubation per primary team  2/1: extubated:  on bipap:  monitor abg:  on meropenem  2/2: clinically looks the same:  not much improved mental status: ABG last night was pretty reasonable:  cont to monitor  2/3: intubated again for myotonic jerks:  cont current treatment:    2/5: intubated:  unable to be extubated:  now possible trach and peg per family   DM  monitor and control   CKD  -cont current meds   HTN   -controlled  1/28:   -not on vasopressors:  antihypertensives being held  1/29: remains hemodynamically stable   1/30 : blood pressure  1/31: remained off pressors  2/2 hemodynamically stable: off pressors  2/3: he is on vasopressors now:   2/5: cont to be on vasopressors:   CVA X 3  -doing ok :     dw family  and team  1/28: currently intubated   1/29: on cpap trials for possible extubation   1/30: intubated:  and precedex  1/31; off precedex but still lethargic : defer to neurology / MICU    2/2: neuro follo w up : has had cvx x 3 before  2/4: per neuro    GI Bleed:   -secondary to Dieulafoy's lesion:  defer to primary team :    dvt prophylaxis:    dw team                [id number]

## 2024-02-05 NOTE — PROGRESS NOTE ADULT - ATTENDING COMMENTS
Patient seen and examined at bedside. During follow up visit, two son were present. I appreciate that.     Detailed neuro exam:  ROS: Unable to obtain due to the patient is currently orally intubated.  Please note, neuro exam is very limited due to the patient on mechanical ventilation via orally intubated.  General: Patient is comfortably lying on bed without any acute distress.  Mental status: On verbal command, patient unable to follow any simple or complex commands.  Cranial exams: Brainstem reflexes are intact cornea, conjunctiva and gag reflexes. Face looks symmetric. Pupils are symmetric, reactive to light bilaterally.  Power: On noxious stimuli the patient had 1-2/5 bilateral four extremities.  Sensation: On noxious stimuli patient grimace at all four extremities.  Coordination and gait: Patient did not participate in the setting of comatose.     Impression and plan:   Mr. 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   Neurology consulted because of altered mental status. Etiology of altered mental status is unknown but most likely the metabolic encephalopathy  versus seizure versus vascular etiology.   Continue Keppra 500 mg BID per renal dose.   Patient would benefit from MRI brain with and without contrast once patient stable.  Discontinue EEG  Continue medical management, neuro- check and fall precaution.  GI and DVT prophylaxis.    Patient is at high risk for complications and morbidity or mortality. There is high probability of imminent or life threatening deterioration in the patient's condition.  Patient is unable or incompetent to participate in giving a history and/or making decisions and discussion is necessary for determining treatment decisions.    I discussed the diagnosis, treatment plan and prognosis with the patient's family. Risk benefit and alternatives to the treatment were discussed. All questions and concerns were addressed. The patient's family demonstrated good understanding of the treatment plan.    My cumulative time taking care of this critically ill patient is 55 minutes  If you have any further questions, please do not hesitate to contact our team.  Thank you for allowing us to participate in this patient care.

## 2024-02-05 NOTE — PROGRESS NOTE ADULT - SUBJECTIVE AND OBJECTIVE BOX
Chief Complaint   Patient presents with     Cough     1 week; hurts in chest when coughing     Headache     Otalgia     Nasal Congestion     SUBJECTIVE:  Zena Quintana is a 54 year old female here with concerns about possible bronchitis.  She states onset of symptoms was over 1 week ago.  She was seen here at [facility] 1 week ago and had sinus pain and pressure at that time as well. She was diagnosed with fluid in her ears and told to use Flonase, Sudafed and an antihistamine. She has done these intermittently. Her sinus symptoms seem to be improving but she has a cough and tightness in her chest.  Course of illness is worsening.   Severity moderate  She has had maxillary pressure as well as nasal congestion, cough, wheezing, headache and post-nasal drainage  Predisposing factors include taking Plaquenil and Methotrexate  Recent treatment has included: OTC meds  She was very nauseated the last time she took prednisone and really wants to avoid taking it.    Past Medical History:   Diagnosis Date     Allergic rhinitis, cause unspecified     Allergic rhinitis     Endometriosis, site unspecified     Endometriosis     Female infertility of other specified origin      Iron deficiency anemia, unspecified     Anemia, iron def.     RA (rheumatoid arthritis) (H)      Stomatitis and mucositis (ulcerative)     chronic - non-specific     Current Outpatient Prescriptions   Medication Sig Dispense Refill     acetaminophen (TYLENOL) 500 MG tablet Take 1-2 tablets by mouth every 6 hours as needed for pain.       albuterol (PROAIR HFA/PROVENTIL HFA/VENTOLIN HFA) 108 (90 Base) MCG/ACT inhaler Inhale 2 puffs into the lungs every 4 hours as needed for shortness of breath / dyspnea or wheezing 1 Inhaler 0     ASPIRIN 81 MG OR TABS 1 TABLET DAILY       azithromycin (ZITHROMAX) 250 MG tablet Two tablets first day, then one tablet daily for four days. 6 tablet 0     bimatoprost (LATISSE) 0.03 % SOLN Externally apply topically At  Neurology Progress Note    SUBJECTIVE/OBJECTIVE/INTERVAL EVENTS: Patient seen and examined at bedside w/ neuro attending and team.     INTERVAL HISTORY: 2/4 EEG report:  EEG Classification / Summary:  Abnormal video-EEG in a lethargic patient.  -Intermittent periods of generalized periodic discharges, with triphasic morphology, at ~1 Hz.   -Moderate to severe diffuse slowing, with variable continuity as above.  Clinical Impression:  -These findings typically suggests severe metabolic encephalopathy; however, in certain clinical scenarios GPD may signify elevated risk of seizures.  -Single-lead EKG shows irregularly irregular rhythm and tachycardia.    REVIEW OF SYSTEMS: ISADORA    VITALS & EXAMINATION:  Vital Signs Last 24 Hrs  T(C): 37.7 (05 Feb 2024 08:00), Max: 37.7 (05 Feb 2024 08:00)  T(F): 99.9 (05 Feb 2024 08:00), Max: 99.9 (05 Feb 2024 08:00)  HR: 89 (05 Feb 2024 10:00) (81 - 96)  BP: 111/45 (05 Feb 2024 10:00) (98/83 - 147/56)  BP(mean): 60 (05 Feb 2024 10:00) (60 - 87)  RR: 19 (05 Feb 2024 10:00) (12 - 20)  SpO2: 100% (05 Feb 2024 10:00) (94% - 100%)    Parameters below as of 05 Feb 2024 08:00  Patient On (Oxygen Delivery Method): ventilator    O2 Concentration (%): 30    Exam:  MS: Eyes open to verbal, tactile stimulation. Looks towards examiner on R. Does not appear to follow commands. Localizes with B arms in response to tactile stimuli.  CN:  No BTT.  Eyes in primary gaze - EOMI with OCR.  Corneal reflex is intact and symmetric.  Eyes close symmetrically and strongly.  Pupils round,  1.5mm-->1mm, B.   No abnormal movements.    Motor/sensory:  Tone: arms with cogwheel rigidity and increased voluntary tone vs. extensor posturing.   Legs w/d slightly to pain.        LABORATORY:  CBC                       7.7    8.14  )-----------( 357      ( 05 Feb 2024 01:58 )             24.1     Chem 02-05    139  |  99  |  34<H>  ----------------------------<  140<H>  3.9   |  30  |  1.73<H>    Ca    7.9<L>      05 Feb 2024 01:58  Phos  3.5     02-05  Mg     1.90     02-05    TPro  6.2  /  Alb  2.2<L>  /  TBili  0.2  /  DBili  x   /  AST  24  /  ALT  29  /  AlkPhos  110  02-05    LFTs LIVER FUNCTIONS - ( 05 Feb 2024 01:58 )  Alb: 2.2 g/dL / Pro: 6.2 g/dL / ALK PHOS: 110 U/L / ALT: 29 U/L / AST: 24 U/L / GGT: x           Coagulopathy PT/INR - ( 05 Feb 2024 01:58 )   PT: 12.1 sec;   INR: 1.07 ratio         PTT - ( 05 Feb 2024 01:58 )  PTT:32.6 sec  Lipid Panel   A1c   Cardiac enzymes     U/A Urinalysis Basic - ( 05 Feb 2024 01:58 )    Color: x / Appearance: x / SG: x / pH: x  Gluc: 140 mg/dL / Ketone: x  / Bili: x / Urobili: x   Blood: x / Protein: x / Nitrite: x   Leuk Esterase: x / RBC: x / WBC x   Sq Epi: x / Non Sq Epi: x / Bacteria: x    STUDIES & IMAGING: (EEG, CT, MR, U/S, TTE/RICHARD): Noted.   Bedtime       bimatoprost (LATISSE) 0.03 % SOLN Externally apply topically At Bedtime 1 Bottle 3     Fexofenadine HCl (MUCINEX ALLERGY PO)        fluticasone (FLONASE) 50 MCG/ACT spray Spray 2 sprays into both nostrils daily 1 Bottle 0     folic acid (FOLVITE) 1 MG tablet Take 1 tablet (1 mg) by mouth daily 100 tablet 3     hydroxychloroquine (PLAQUENIL) 200 MG tablet Hydroxychloroquine 200mg daily; and an additional 200mg every other day. 135 tablet 3     ibuprofen (ADVIL) 200 MG capsule Take 200 mg by mouth as needed for fever       loratadine 10 MG capsule Take 10 mg by mouth as needed for allergies       methotrexate sodium 2.5 MG TABS Take 6 tablets (15 mg) by mouth once a week . Take all 6 tablets on the same day of each week. 24 tablet 4     MULTIPLE VITAMINS/WOMENS OR None Entered       OMEGA-3 FATTY ACIDS 600 MG OR CAPS Reported on 3/15/2017 30 0     ondansetron (ZOFRAN ODT) 4 MG ODT tab Take 1-2 tablet 30 min before prednisone. 10 tablet 0     predniSONE (DELTASONE) 20 MG tablet Take 2 tablets (40 mg) by mouth daily for 5 days 10 tablet 0     Social History   Substance Use Topics     Smoking status: Never Smoker     Smokeless tobacco: Never Used      Comment: no smokers in household     Alcohol use 1.0 oz/week      Comment: has a couple drinks now and again     Allergies   Allergen Reactions     Penicillins Hives     hives     REVIEW OF SYSTEMS  General: POSITIVE for headaches. NEGATIVE for fever, chills.  Eyes: NEGATIVE for redness, tearing, itching.  ENT: POSITIVE for sinus congestion, postnasal drainage, swollen glands, ear fullness. NEGATIVE for sore throat.  Resp:POSITIVE for cough productive of green sputum, wheezing and tightness in chest. NEGATIVE for hemoptysis and dyspnea at rest.    OBJECTIVE:  /73 (BP Location: Left arm)  Pulse 69  Temp 97.3  F (36.3  C) (Tympanic)  SpO2 100%  GENERAL APPEARANCE: healthy, alert and no distress  EYES: PERRL, conjunctiva clear  HENT: No pain with  palpation over frontal and maxillary sinuses. Ear canals normal TMs with mild serous effusions bilaterally, L>R. Nasal turbinates edematous and boggy with a blue hue bilaterally. Posterior pharynx is not erythematous.  NECK: supple, nontender, no lymphadenopathy  RESP: lungs wheezy with inspiration and expiration throughout lung field to auscultation - no rales, rhonchi. Breathing is comfortable without use of accessory muscles. Cough is persistent.  CV: regular rates and rhythm, normal S1 S2, no murmur noted    ASSESSMENT:    ICD-10-CM    1. Acute bronchitis with coexisting condition requiring prophylactic treatment J20.9 azithromycin (ZITHROMAX) 250 MG tablet     predniSONE (DELTASONE) 20 MG tablet   2. Acute bronchospasm J98.01 predniSONE (DELTASONE) 20 MG tablet     albuterol (PROAIR HFA/PROVENTIL HFA/VENTOLIN HFA) 108 (90 Base) MCG/ACT inhaler   3. Prophylactic measure Z29.9 ondansetron (ZOFRAN ODT) 4 MG ODT tab     PLAN:   Patient Instructions   Azithromycin 250 mg as directed- 2 pills together at the same time today then 1 pill a day for the next 4 days.  Albuterol 2 puffs every 4 hours as needed for tightness in chest.  Prednisone 40 mg daily for 5 days.  May take Zofran 30 min before prednisone dose.    Rest! Your body needs more rest to heal.  Drink plenty of fluids (warm fluids like tea or soup are soothing and reduce cough)  Sit in the bathroom with a hot shower running and breathe in the steam.  Honey may soothe your sore throat and help manage your cough- may take straight or in warm water with lemon juice.  Avoid smoke (cigarettes, bonfires, fireplace, wood burning stoves).  Take Tylenol or an NSAID such as ibuprofen or naproxen as needed for pain.  Delsym (dextromethorphan polistirex) is an over the counter cough medication that lasts 12 hours.   Mucinex or Robitussin (guiafenesin) thin mucus and may help it to loosen more quickly  Good handwashing is the best way to prevent spread of germs  Present  to emergency room if you develop trouble breathing, swallowing or cough-up blood.  Follow up with your primary care provider if symptoms worsen or fail to improve as expected.    Follow up with primary care provider with any problems, questions or concerns or if symptoms worsen or fail to improve. Patient agreed to plan and verbalized understanding.    Chanell Jackson PA-C  [facility]

## 2024-02-05 NOTE — PROGRESS NOTE ADULT - ASSESSMENT
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     PLAN  =====Neurologic=====  #CVA  - prior CVA x3 with no residual deficits  #myoclonic jerks  - on Keppra, but now off valproate per neuro    - also per neuro, use precedex as needed for clinical sedation as opposed to propofol   - holding home  gabapentin and memantine in setting of somnolence  - continue lexapro   - f/u 24 hour EEG  - CT/CTA negative for stroke   - MRI brain when stable and off EEG      =====Pulmonary=====  #COVID  - s/p paxlovid and 1 dose remdesivir while inpatient  #Pleural effusions b/l  - CT chest from 1/16 showing new small bilateral pleural effusions/ atelectasis/ patchy bilateral ground-glass opacities are consistent with pulmonary edema.  - s/p zosyn for aspiration PNA from 1/20- 1/28   - POCUS with resolution of b/l pleural effusions, still holding brilinta for possible reaccumulation requiring pleural tap and/or other procedure ie trach/PEG.       =====Cardiovascular=====  Patient does not currently require vasopressors and is normotensive  #HTN  - on home amlodipine and metoprolol currently holding     #CAD  - on Brilinta (holding) aspirin and ranolazine continue   - as per vascular okay to hold Brilinta for possible pleural effusion thoracentesis  -have not heard back from cardiology regarding Brilinta / last stent 2022 , will hold   - on asa suppository     #Afib  - pt with known history of pAF, first observed 2/1  - can consider starting AC and/or rate control if persists     =====GI=====  #upper GI bleed  - s/p EGD showing dieulafoy lesion and esophagitis likely 2/2 NGT irritation s/p 2 clips  - on protonix IV BID continue   - CT on 1/25 with cholelithiasis and sludge HIDA on 1/26 confirming likely acute choley, s/p IR perc cholecystostomy 1/26   - plan for PEG tube per familys GOC once fever free for 48h per GI    #Diet  - tube feeds NGT     =====Renal/=====  #Electrolytes  - Maintain K>4, Phos>3, Mag>2, iCal>1  - baseline cr 1.5, at baseline  - on free water flushes for hyperNa. Trend BMP/Na     =====Endocrine=====  #DM2  - on lantus 15U and q6 SSI  - a1c 9.3, glucose wnl inpatient       =====Infectious Disease=====  #COVID   - s/p paxlovid and 1 dose remdesivir  # PNA  - CT chest showing ground glass opacities  - s/p zosyn  - intermittent episodes of fevers, likely source GI given choleycystitis? culture NGTD  - meropenem 5d course thru 2/1   - now monitoring off antibiotics    =====Heme/Onc=====  #DVT Ppx  - heparin sub-q    =====Ethics=====  FULL CODE. 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     PLAN  =====Neurologic=====  #CVA  - prior CVA x3 with no residual deficits  #myoclonic jerks  - on Keppra 500 mg BID, per neuro wishing to wean however per family request will continue at 500 mg BID, but now off valproate per neuro    - also per neuro, use precedex as needed for clinical sedation as opposed to propofol   - holding home  gabapentin and memantine in setting of somnolence  - continue lexapro   - f/u 24 hour EEG  - CT/CTA negative for stroke   - MRI brain when stable and off EEG - will need to evaluate PPM for compatibility       =====Pulmonary=====  #COVID  - s/p paxlovid and 1 dose remdesivir while inpatient  #Pleural effusions b/l  - CT chest from 1/16 showing new small bilateral pleural effusions/ atelectasis/ patchy bilateral ground-glass opacities are consistent with pulmonary edema. Repeat XR w/ 2/2 findings c/f edema as well  - s/p zosyn for aspiration PNA from 1/20- 1/28   - POCUS with resolution of b/l pleural effusions, still holding brilinta for possible reaccumulation requiring pleural tap and/or other procedure ie trach/PEG.       =====Cardiovascular=====  On small dose of pressors, wean as tolerated  #HTN  - on home amlodipine and metoprolol currently holding     #CAD  - on Brilinta (holding) aspirin and ranolazine continue   - as per vascular okay to hold Brilinta for possible pleural effusion thoracentesis  -have not heard back from cardiology regarding Brilinta / last stent 2022 , will hold   - on asa suppository -> To oral as pt w/ oral access now    #Afib  - pt with known history of pAF, first observed 2/1  - can consider starting AC and/or rate control if persists     =====GI=====  #upper GI bleed  - s/p EGD showing dieulafoy lesion and esophagitis likely 2/2 NGT irritation s/p 2 clips  - on protonix IV BID continue   - CT on 1/25 with cholelithiasis and sludge HIDA on 1/26 confirming likely acute choley, s/p IR perc cholecystostomy 1/26   - plan for PEG tube per familys GOC once fever free for 48h per GI    #Diet  - tube feeds NGT     =====Renal/=====  #Electrolytes  - Maintain K>4, Phos>3, Mag>2, iCal>1  - baseline cr 1.5, at baseline  - on free water flushes for hyperNa. Trend BMP/Na   - Pt w/ contraction alkalosis due to overdiuresis, CTM, dose diamox to keep net negative    =====Endocrine=====  #DM2  - on lantus 15U and q6 SSI  - a1c 9.3, glucose wnl inpatient       =====Infectious Disease=====  #COVID   - s/p paxlovid and 1 dose remdesivir  # PNA  - CT chest showing ground glass opacities  - s/p zosyn  - intermittent episodes of fevers, likely source GI given choleycystitis? culture NGTD  - meropenem 5d course thru 2/1   - now monitoring off antibiotics    =====Heme/Onc=====  #DVT Ppx  - heparin sub-q    =====Ethics=====  FULL CODE.  After family discussion, amenable to trach/PEG

## 2024-02-05 NOTE — PROGRESS NOTE ADULT - SUBJECTIVE AND OBJECTIVE BOX
Aashish Boyer MD  Interventional Cardiology / Advance Heart Failure and Cardiac Transplant Specialist  [place] Office : [phone] [place] [id number]  Tel:   [place] Office : [address] [facility] [address]  Tel: [phone]      Subjective/Overnight events: Pt is lying in bed remains in ICU  	  MEDICATIONS:  aspirin  chewable 81 milliGRAM(s) Enteral Tube daily  heparin   Injectable 5000 Unit(s) SubCutaneous every 8 hours  norepinephrine Infusion 0.05 MICROgram(s)/kG/Min IV Continuous <Continuous>      albuterol/ipratropium for Nebulization 3 milliLiter(s) Nebulizer every 6 hours  sodium chloride 3%  Inhalation 4 milliLiter(s) Inhalation two times a day    dexMEDEtomidine Infusion 0.2 MICROgram(s)/kG/Hr IV Continuous <Continuous>  escitalopram 10 milliGRAM(s) Oral daily  levETIRAcetam  IVPB 500 milliGRAM(s) IV Intermittent every 12 hours    pantoprazole  Injectable 40 milliGRAM(s) IV Push every 12 hours  sucralfate suspension 1 Gram(s) Enteral Tube two times a day    dextrose 50% Injectable 25 Gram(s) IV Push once  dextrose 50% Injectable 25 Gram(s) IV Push once  dextrose 50% Injectable 12.5 Gram(s) IV Push once  dextrose Oral Gel 15 Gram(s) Oral once PRN  glucagon  Injectable 1 milliGRAM(s) IntraMuscular once  insulin glargine Injectable (LANTUS) 15 Unit(s) SubCutaneous <User Schedule>  insulin lispro (ADMELOG) corrective regimen sliding scale   SubCutaneous every 6 hours    artificial  tears Solution 1 Drop(s) Left EYE four times a day  chlorhexidine 0.12% Liquid 15 milliLiter(s) Oral Mucosa every 12 hours  chlorhexidine 2% Cloths 1 Application(s) Topical daily  dextrose 5%. 1000 milliLiter(s) IV Continuous <Continuous>  dextrose 5%. 1000 milliLiter(s) IV Continuous <Continuous>  lidocaine   4% Patch 1 Patch Transdermal every 24 hours  petrolatum Ophthalmic Ointment 1 Application(s) Left EYE at bedtime      PAST MEDICAL/SURGICAL HISTORY  PAST MEDICAL & SURGICAL HISTORY:  Hyperlipemia      Hypertension      Coronary Artery Disease      Diabetes Mellitus Type II      Stented Coronary Artery  total 5 stents, last stent 5/2019      Neuropathy      Myocardial infarction      Stroke  mild left facial numbness   no other residuals verbalized      Myoclonic jerking      Stage 3 chronic kidney disease      History of Cataract Extraction      Hx of CABG      H/O coronary angiogram      S/P coronary artery stent placement  1/6/09      S/P placement of cardiac pacemaker          SOCIAL HISTORY: Substance Use (street drugs): ( x ) never used  (  ) other:    FAMILY HISTORY:  No pertinent family history in first degree relatives            PHYSICAL EXAM:  T(C): 37.8 (02-05-24 @ 12:00), Max: 37.8 (02-05-24 @ 12:00)  HR: 87 (02-05-24 @ 15:00) (83 - 96)  BP: 145/55 (02-05-24 @ 15:00) (90/36 - 147/56)  RR: 17 (02-05-24 @ 15:00) (12 - 21)  SpO2: 100% (02-05-24 @ 15:00) (94% - 100%)  Wt(kg): --  I&O's Summary    04 Feb 2024 07:01  -  05 Feb 2024 07:00  --------------------------------------------------------  IN: 2621.7 mL / OUT: 5195 mL / NET: -2573.3 mL    05 Feb 2024 07:01  -  05 Feb 2024 15:49  --------------------------------------------------------  IN: 662.9 mL / OUT: 430 mL / NET: 232.9 mL          GENERAL: intubated  EYES:  conjunctiva and sclera clear  ENMT: No tonsillar erythema, exudates, or enlargement  Cardiovascular: Normal S1 S2, No JVD, No murmurs, No edema  Respiratory: Lungs clear to auscultation	  Gastrointestinal:  Soft  Extremities: No edema                                     7.7    8.14  )-----------( 357      ( 05 Feb 2024 01:58 )             24.1     02-05    139  |  99  |  34<H>  ----------------------------<  140<H>  3.9   |  30  |  1.73<H>    Ca    7.9<L>      05 Feb 2024 01:58  Phos  3.5     02-05  Mg     1.90     02-05    TPro  6.2  /  Alb  2.2<L>  /  TBili  0.2  /  DBili  x   /  AST  24  /  ALT  29  /  AlkPhos  110  02-05    proBNP:   Lipid Profile:   HgA1c:   TSH:     Consultant(s) Notes Reviewed:  [x ] YES  [ ] NO    Care Discussed with Consultants/Other Providers [ x] YES  [ ] NO    Imaging Personally Reviewed independently:  [x] YES  [ ] NO    All labs, radiologic studies, vitals, orders and medications list reviewed. Patient is seen and examined at bedside. Case discussed with medical team.

## 2024-02-05 NOTE — PROGRESS NOTE ADULT - ASSESSMENT
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 During hospitalization, pt was taken off Brilinta for a planned pleural tap of lung effusions/GI had plans for PEG tube placement. Pt was intubated and since extubation, it has been noted that pt has been completely "aphasic"- nonverbal and not following commands for which neurology has been consulted. LKW 7:30 PM 1/30/24. Also, it has been noted that there is a R facial droop and pt has been having less movements on the LUE/LLE and pt has been looking towards the right side more than his left side, and has been having "myoclonic jerks" in his shoulders with some extended and inward "posturing in his arms". Prior to extubation, he was following commands. At baseline, prior to hospital admission pt was using a cane but independent of ADLs. Sedation stopped at 7:30 PM 1/31/24. EEG was started on 2/1/24 which showed intermittent generalized myoclonic seizures consisting of brief myoclonic jerks in addition to generalized ictal-interictal continuum. 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    IMPRESSION: Acute change in mental status w/ reported aphasia and decreased movements on Left side and transient R facial droop after extubation possibly 2/2 seizure-like activity vs. vascular etiology in the setting of known paroxsymal Afib. 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    RECOMMENDATIONS:   [] Continue w/ vEEG  [x] s/p Valproic acid 1500mg x1 (2/1/24)  [x] Load Valproic acid 1600mg x1 (completed at 2/2/24 at 12pm)  [x] Continue levetiracetam 500mg IV Q12H  [x] continue keppra   [x] d/c valproic acid  [] taper off of propofol and hold off on using midazolam    [] if needed for clinical sedation, please use precedex  [x] Continue home ASA 300mg suppository QD  [] Start Atorvastatin per ASCVD risk; if stroke on MRI, then high-intensity dosage  [] MRI brain w/ and w/o contrast only when medically stable  [x] TTE study - EF 62% no regional wall abnl, mild mod severe AS mild AR  [x] Check HbA1c (9.3) and lipid panel (LDL 20)  [x] Telemonitoring  [] Check EKG for [place] interval  [] Rest of care per MICU team   Assessment  89M PMHx of HTN, HLD, MI, CAD s/p CABG s/p stents (last stent May 2022) on daily ASA and brilinta, s/p PPM, DM2, CKD stage 3 (baseline Cr 1.2-1.3 as per family), PVD, CVA x3 (without residual deficits except for mild left facial numbness), and 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 During hospitalization, pt was taken off Brilinta for a planned pleural tap of lung effusions/GI had plans for PEG tube placement. Pt was intubated and since extubation, it has been noted that pt has been completely "aphasic"- nonverbal and not following commands for which neurology has been consulted. LKW 7:30 PM 1/30/24. Also, it has been noted that there is a R facial droop and pt has been having less movements on the LUE/LLE and pt has been looking towards the right side more than his left side, and has been having "myoclonic jerks" in his shoulders with some extended and inward "posturing in his arms". Prior to extubation, he was following commands. At baseline, prior to hospital admission pt was using a cane but independent of ADLs. Sedation stopped at 7:30 PM 1/31/24. EEG was started on 2/1/24 which showed intermittent generalized myoclonic seizures consisting of brief myoclonic jerks in addition to generalized ictal-interictal continuum. 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    IMPRESSION: Acute change in mental status w/ reported aphasia and decreased movements on Left side and transient R facial droop after extubation possibly 2/2 seizure-like activity vs. vascular etiology in the setting of known paroxsymal Afib. 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    RECOMMENDATIONS:   [] Continue w/ vEEG  [x] s/p Valproic acid 1500mg x1 (2/1/24), valproic acid 1600mg x1 (completed at 2/2/24 at 12pm), valproic acid d/dangelo  [x] Continue levetiracetam 500mg IV Q12H  [x] d/c valproic acid  [] taper off of propofol and hold off on using midazolam  [] if needed for clinical sedation, please use precedex  [x] Continue home ASA 300mg suppository QD  [] Start Atorvastatin per ASCVD risk; if stroke on MRI, then high-intensity dosage  [] MRI brain w/ and w/o contrast only when medically stable  [x] TTE study - EF 62% no regional wall abnl, mild mod severe AS mild AR  [x] Check HbA1c (9.3) and lipid panel (LDL 20)  [x] Telemonitoring  [] Check EKG for [place] interval  [] Rest of care per MICU team   Assessment  89M PMHx of HTN, HLD, MI, CAD s/p CABG s/p stents (last stent May 2022) on daily ASA and brilinta, s/p PPM, DM2, CKD stage 3 (baseline Cr 1.2-1.3 as per family), PVD, CVA x3 (without residual deficits except for mild left facial numbness), and 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 During hospitalization, pt was taken off Brilinta for a planned pleural tap of lung effusions/GI had plans for PEG tube placement. Pt was intubated and since extubation, it has been noted that pt has been completely "aphasic"- nonverbal and not following commands for which neurology has been consulted. LKW 7:30 PM 1/30/24. Also, it has been noted that there is a R facial droop and pt has been having less movements on the LUE/LLE and pt has been looking towards the right side more than his left side, and has been having "myoclonic jerks" in his shoulders with some extended and inward "posturing in his arms". Prior to extubation, he was following commands. At baseline, prior to hospital admission pt was using a cane but independent of ADLs. Sedation stopped at 7:30 PM 1/31/24. EEG was started on 2/1/24 which showed intermittent generalized myoclonic seizures consisting of brief myoclonic jerks in addition to generalized ictal-interictal continuum. 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    IMPRESSION: Acute change in mental status w/ reported aphasia and decreased movements on Left side and transient R facial droop after extubation possibly 2/2 seizure-like activity vs. vascular etiology in the setting of known paroxsymal Afib. 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    RECOMMENDATIONS:   [] Continue w/ vEEG  [x] s/p Valproic acid 1500mg x1 (2/1/24), valproic acid 1600mg x1 (completed at 2/2/24 at 12pm), valproic acid d/dangelo  [x] Continue levetiracetam 500mg IV Q12H  [x] d/c valproic acid  [] taper off of propofol and hold off on using midazolam  [] if needed for clinical sedation, please use precedex  [x] Continue home ASA 300mg suppository QD  [] Start Atorvastatin per ASCVD risk; if stroke on MRI, then high-intensity dosage  [] MRI brain w/ and w/o contrast only when medically stable  [x] TTE study - EF 62% no regional wall abnl, mild mod severe AS mild AR  [x] Check HbA1c (9.3) and lipid panel (LDL 20)  [x] Telemonitoring  [] Rest of care per MICU team

## 2024-02-05 NOTE — PROGRESS NOTE ADULT - ASSESSMENT
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     EKG SR RBBB (old per family)     1) Hypoxia   - s/p bronch   - intubated   - Rt pleural effusion   - hold lasix given Hypernatremia and worsening creat  - intubated again , AMS , on EEG   - f/u neuro recs     2) CAD s/p CABG  - p/w chest pain. EKG non ischemic , trop -sera   - echo with normal lv and moderate AS   - c/w metoprolol   - chest pains  Trop mildly elevated and trending down likely sec to kidney disease,   - holding brilinta, was on it for SMA stent placed in 12/2023, held 2/2 anticipation for PEG placement, restart when no further invasive procedures planned    3) Covid +  - s/p remdesivir   - c/w supportive management   - t/t per primary team     4) Abd distension   -CTA AP obtained which showed  partially occlusive calcified and non-calcified plaque just distal to take-off of the SMA, with estimated at least 75% luminal narrowing. Limited evaluation of its distal branches. 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   -on Protonix IV q 12

## 2024-02-06 LAB
ALBUMIN SERPL ELPH-MCNC: 2.1 G/DL — LOW (ref 3.3–5)
ALP SERPL-CCNC: 93 U/L — SIGNIFICANT CHANGE UP (ref 40–120)
ALT FLD-CCNC: 19 U/L — SIGNIFICANT CHANGE UP (ref 4–41)
ANION GAP SERPL CALC-SCNC: 10 MMOL/L — SIGNIFICANT CHANGE UP (ref 7–14)
APTT BLD: 31.4 SEC — SIGNIFICANT CHANGE UP (ref 24.5–35.6)
AST SERPL-CCNC: 19 U/L — SIGNIFICANT CHANGE UP (ref 4–40)
BILIRUB SERPL-MCNC: 0.2 MG/DL — SIGNIFICANT CHANGE UP (ref 0.2–1.2)
BLOOD GAS ARTERIAL COMPREHENSIVE RESULT: SIGNIFICANT CHANGE UP
BUN SERPL-MCNC: 33 MG/DL — HIGH (ref 7–23)
CALCIUM SERPL-MCNC: 7.7 MG/DL — LOW (ref 8.4–10.5)
CHLORIDE SERPL-SCNC: 103 MMOL/L — SIGNIFICANT CHANGE UP (ref 98–107)
CO2 SERPL-SCNC: 27 MMOL/L — SIGNIFICANT CHANGE UP (ref 22–31)
CREAT SERPL-MCNC: 1.58 MG/DL — HIGH (ref 0.5–1.3)
EGFR: 41 ML/MIN/1.73M2 — LOW
GLUCOSE BLDC GLUCOMTR-MCNC: 100 MG/DL — HIGH (ref 70–99)
GLUCOSE BLDC GLUCOMTR-MCNC: 145 MG/DL — HIGH (ref 70–99)
GLUCOSE BLDC GLUCOMTR-MCNC: 185 MG/DL — HIGH (ref 70–99)
GLUCOSE BLDC GLUCOMTR-MCNC: 204 MG/DL — HIGH (ref 70–99)
GLUCOSE SERPL-MCNC: 108 MG/DL — HIGH (ref 70–99)
HCT VFR BLD CALC: 22.6 % — LOW (ref 39–50)
HGB BLD-MCNC: 7.2 G/DL — LOW (ref 13–17)
INR BLD: 1.02 RATIO — SIGNIFICANT CHANGE UP (ref 0.85–1.18)
MAGNESIUM SERPL-MCNC: 1.9 MG/DL — SIGNIFICANT CHANGE UP (ref 1.6–2.6)
MCHC RBC-ENTMCNC: 29.3 PG — SIGNIFICANT CHANGE UP (ref 27–34)
MCHC RBC-ENTMCNC: 31.9 GM/DL — LOW (ref 32–36)
MCV RBC AUTO: 91.9 FL — SIGNIFICANT CHANGE UP (ref 80–100)
NRBC # BLD: 0 /100 WBCS — SIGNIFICANT CHANGE UP (ref 0–0)
NRBC # FLD: 0 K/UL — SIGNIFICANT CHANGE UP (ref 0–0)
PHOSPHATE SERPL-MCNC: 3.3 MG/DL — SIGNIFICANT CHANGE UP (ref 2.5–4.5)
PLATELET # BLD AUTO: 308 K/UL — SIGNIFICANT CHANGE UP (ref 150–400)
POTASSIUM SERPL-MCNC: 3.9 MMOL/L — SIGNIFICANT CHANGE UP (ref 3.5–5.3)
POTASSIUM SERPL-SCNC: 3.9 MMOL/L — SIGNIFICANT CHANGE UP (ref 3.5–5.3)
PROT SERPL-MCNC: 5.9 G/DL — LOW (ref 6–8.3)
PROTHROM AB SERPL-ACNC: 11.5 SEC — SIGNIFICANT CHANGE UP (ref 9.5–13)
RBC # BLD: 2.46 M/UL — LOW (ref 4.2–5.8)
RBC # FLD: 17.4 % — HIGH (ref 10.3–14.5)
SODIUM SERPL-SCNC: 140 MMOL/L — SIGNIFICANT CHANGE UP (ref 135–145)
WBC # BLD: 7.33 K/UL — SIGNIFICANT CHANGE UP (ref 3.8–10.5)
WBC # FLD AUTO: 7.33 K/UL — SIGNIFICANT CHANGE UP (ref 3.8–10.5)

## 2024-02-06 PROCEDURE — 99291 CRITICAL CARE FIRST HOUR: CPT

## 2024-02-06 PROCEDURE — 99232 SBSQ HOSP IP/OBS MODERATE 35: CPT | Mod: GC

## 2024-02-06 PROCEDURE — 99232 SBSQ HOSP IP/OBS MODERATE 35: CPT | Mod: FS

## 2024-02-06 PROCEDURE — 95720 EEG PHY/QHP EA INCR W/VEEG: CPT

## 2024-02-06 RX ORDER — LIDOCAINE HYDROCHLORIDE AND EPINEPHRINE 10; 10 MG/ML; UG/ML
20 INJECTION, SOLUTION INFILTRATION; PERINEURAL ONCE
Refills: 0 | Status: DISCONTINUED | OUTPATIENT
Start: 2024-02-06 | End: 2024-02-06

## 2024-02-06 RX ORDER — TRANEXAMIC ACID 100 MG/ML
5 INJECTION, SOLUTION INTRAVENOUS ONCE
Refills: 0 | Status: DISCONTINUED | OUTPATIENT
Start: 2024-02-06 | End: 2024-02-06

## 2024-02-06 RX ORDER — INSULIN GLARGINE 100 [IU]/ML
12 INJECTION, SOLUTION SUBCUTANEOUS
Refills: 0 | Status: DISCONTINUED | OUTPATIENT
Start: 2024-02-06 | End: 2024-03-11

## 2024-02-06 RX ORDER — HYDRALAZINE HCL 50 MG
10 TABLET ORAL ONCE
Refills: 0 | Status: DISCONTINUED | OUTPATIENT
Start: 2024-02-06 | End: 2024-02-06

## 2024-02-06 RX ADMIN — LIDOCAINE 1 PATCH: 4 CREAM TOPICAL at 08:00

## 2024-02-06 RX ADMIN — PANTOPRAZOLE SODIUM 40 MILLIGRAM(S): 20 TABLET, DELAYED RELEASE ORAL at 05:04

## 2024-02-06 RX ADMIN — Medication 1 APPLICATION(S): at 22:06

## 2024-02-06 RX ADMIN — INSULIN GLARGINE 12 UNIT(S): 100 INJECTION, SOLUTION SUBCUTANEOUS at 12:31

## 2024-02-06 RX ADMIN — CHLORHEXIDINE GLUCONATE 1 APPLICATION(S): 213 SOLUTION TOPICAL at 12:59

## 2024-02-06 RX ADMIN — ESCITALOPRAM OXALATE 10 MILLIGRAM(S): 10 TABLET, FILM COATED ORAL at 12:31

## 2024-02-06 RX ADMIN — HEPARIN SODIUM 5000 UNIT(S): 5000 INJECTION INTRAVENOUS; SUBCUTANEOUS at 05:04

## 2024-02-06 RX ADMIN — Medication 4: at 17:30

## 2024-02-06 RX ADMIN — Medication 3 MILLILITER(S): at 22:31

## 2024-02-06 RX ADMIN — PANTOPRAZOLE SODIUM 40 MILLIGRAM(S): 20 TABLET, DELAYED RELEASE ORAL at 17:19

## 2024-02-06 RX ADMIN — Medication 3 MILLILITER(S): at 16:49

## 2024-02-06 RX ADMIN — Medication 1 DROP(S): at 17:30

## 2024-02-06 RX ADMIN — SODIUM CHLORIDE 4 MILLILITER(S): 9 INJECTION INTRAMUSCULAR; INTRAVENOUS; SUBCUTANEOUS at 09:35

## 2024-02-06 RX ADMIN — Medication 1 GRAM(S): at 05:04

## 2024-02-06 RX ADMIN — Medication 1 GRAM(S): at 17:20

## 2024-02-06 RX ADMIN — Medication 1 DROP(S): at 05:04

## 2024-02-06 RX ADMIN — HEPARIN SODIUM 5000 UNIT(S): 5000 INJECTION INTRAVENOUS; SUBCUTANEOUS at 22:05

## 2024-02-06 RX ADMIN — Medication 1 DROP(S): at 12:31

## 2024-02-06 RX ADMIN — CHLORHEXIDINE GLUCONATE 15 MILLILITER(S): 213 SOLUTION TOPICAL at 17:19

## 2024-02-06 RX ADMIN — LIDOCAINE 1 PATCH: 4 CREAM TOPICAL at 07:00

## 2024-02-06 RX ADMIN — LEVETIRACETAM 400 MILLIGRAM(S): 250 TABLET, FILM COATED ORAL at 05:05

## 2024-02-06 RX ADMIN — Medication 3 MILLILITER(S): at 03:20

## 2024-02-06 RX ADMIN — LEVETIRACETAM 400 MILLIGRAM(S): 250 TABLET, FILM COATED ORAL at 17:20

## 2024-02-06 RX ADMIN — HEPARIN SODIUM 5000 UNIT(S): 5000 INJECTION INTRAVENOUS; SUBCUTANEOUS at 14:17

## 2024-02-06 RX ADMIN — CHLORHEXIDINE GLUCONATE 15 MILLILITER(S): 213 SOLUTION TOPICAL at 05:04

## 2024-02-06 RX ADMIN — Medication 3 MILLILITER(S): at 09:35

## 2024-02-06 RX ADMIN — DEXMEDETOMIDINE HYDROCHLORIDE IN 0.9% SODIUM CHLORIDE 3.97 MICROGRAM(S)/KG/HR: 4 INJECTION INTRAVENOUS at 08:21

## 2024-02-06 RX ADMIN — LIDOCAINE 1 PATCH: 4 CREAM TOPICAL at 22:06

## 2024-02-06 RX ADMIN — Medication 3.72 MICROGRAM(S)/KG/MIN: at 08:20

## 2024-02-06 RX ADMIN — Medication 81 MILLIGRAM(S): at 12:30

## 2024-02-06 RX ADMIN — SODIUM CHLORIDE 4 MILLILITER(S): 9 INJECTION INTRAMUSCULAR; INTRAVENOUS; SUBCUTANEOUS at 22:32

## 2024-02-06 NOTE — PROGRESS NOTE ADULT - ASSESSMENT
on vent , tolerated bipap  afebrile      albuterol/ipratropium for Nebulization 3 milliLiter(s) Nebulizer every 6 hours  artificial  tears Solution 1 Drop(s) Left EYE four times a day  aspirin Suppository 300 milliGRAM(s) Rectal daily  buMETAnide Injectable 2 milliGRAM(s) IV Push every 12 hours  chlorhexidine 0.12% Liquid 15 milliLiter(s) Oral Mucosa every 12 hours  chlorhexidine 2% Cloths 1 Application(s) Topical daily  dexMEDEtomidine Infusion 0.2 MICROgram(s)/kG/Hr IV Continuous <Continuous>  dextrose 5%. 1000 milliLiter(s) IV Continuous <Continuous>  dextrose 5%. 1000 milliLiter(s) IV Continuous <Continuous>  dextrose 50% Injectable 25 Gram(s) IV Push once  dextrose 50% Injectable 25 Gram(s) IV Push once  dextrose 50% Injectable 12.5 Gram(s) IV Push once  dextrose Oral Gel 15 Gram(s) Oral once PRN  escitalopram 10 milliGRAM(s) Oral daily  glucagon  Injectable 1 milliGRAM(s) IntraMuscular once  heparin   Injectable 5000 Unit(s) SubCutaneous every 8 hours  insulin glargine Injectable (LANTUS) 15 Unit(s) SubCutaneous <User Schedule>  insulin lispro (ADMELOG) corrective regimen sliding scale   SubCutaneous every 6 hours  levETIRAcetam  IVPB 500 milliGRAM(s) IV Intermittent every 12 hours  lidocaine   4% Patch 1 Patch Transdermal every 24 hours  norepinephrine Infusion 0.05 MICROgram(s)/kG/Min IV Continuous <Continuous>  pantoprazole  Injectable 40 milliGRAM(s) IV Push every 12 hours  petrolatum Ophthalmic Ointment 1 Application(s) Left EYE at bedtime  propofol Infusion 30.055 MICROgram(s)/kG/Min IV Continuous <Continuous>  sodium chloride 3%  Inhalation 4 milliLiter(s) Inhalation every 6 hours  sucralfate suspension 1 Gram(s) Enteral Tube two times a day  valproate sodium  IVPB 600 milliGRAM(s) IV Intermittent every 6 hours      VITAL:  T(C): , Max: 37.2 (02-04-24 @ 08:00)  T(F): , Max: 98.9 (02-04-24 @ 08:00)  HR: 88 (02-04-24 @ 08:00)  BP: 138/60 (02-04-24 @ 08:00)  BP(mean): 79 (02-04-24 @ 08:00)  RR: 14 (02-04-24 @ 08:00)  SpO2: 100% (02-04-24 @ 08:00)  Wt(kg): --    02-03-24 @ 07:01  -  02-04-24 @ 07:00  --------------------------------------------------------  IN: 3227.8 mL / OUT: 4555 mL / NET: -1327.2 mL    02-04-24 @ 07:01  -  02-04-24 @ 08:30  --------------------------------------------------------  IN: 110.8 mL / OUT: 400 mL / NET: -289.2 mL        PHYSICAL EXAM:  Constitutional: no distress   HEENT: on bipap  Neck: No LAD, No JVD  Respiratory: diminished b/l  Cardiovascular: S1 and S2  Gastrointestinal: BS+, soft, NT/ND  Extremities: + l/e edema  Neurological: UTO  : + Moss  Skin: No rashes     LABS:                          7.4    7.13  )-----------( 369      ( 04 Feb 2024 02:46 )             24.0     Na(144)/K(3.7)/Cl(103)/HCO3(31)/BUN(32)/Cr(1.87)Glu(148)/Ca(8.0)/Mg(2.10)/PO4(3.4)    02-04 @ 02:46  Na(147)/K(3.7)/Cl(107)/HCO3(30)/BUN(35)/Cr(1.84)Glu(223)/Ca(8.1)/Mg(2.10)/PO4(2.7)    02-03 @ 10:40  Na(151)/K(3.2)/Cl(110)/HCO3(30)/BUN(35)/Cr(1.89)Glu(94)/Ca(8.2)/Mg(2.30)/PO4(2.2)    02-03 @ 04:24  Na(146)/K(4.0)/Cl(108)/HCO3(25)/BUN(37)/Cr(1.86)Glu(215)/Ca(8.3)/Mg(2.50)/PO4(3.7)    02-02 @ 18:21  Na(148)/K(4.0)/Cl(108)/HCO3(30)/BUN(39)/Cr(1.87)Glu(327)/Ca(8.4)/Mg(--)/PO4(--)    02-02 @ 11:59  Na(148)/K(4.2)/Cl(108)/HCO3(29)/BUN(39)/Cr(1.95)Glu(320)/Ca(8.5)/Mg(2.60)/PO4(4.2)    02-02 @ 06:19  Na(151)/K(4.2)/Cl(110)/HCO3(27)/BUN(39)/Cr(1.88)Glu(258)/Ca(8.3)/Mg(2.50)/PO4(4.5)    02-01 @ 21:34  Na(153)/K(4.5)/Cl(111)/HCO3(27)/BUN(40)/Cr(1.87)Glu(216)/Ca(8.4)/Mg(--)/PO4(--)    02-01 @ 16:29  Na(152)/K(4.7)/Cl(111)/HCO3(28)/BUN(42)/Cr(1.87)Glu(215)/Ca(8.5)/Mg(2.60)/PO4(4.7)    02-01 @ 12:05    Urinalysis Basic - ( 04 Feb 2024 02:46 )    Color: x / Appearance: x / SG: x / pH: x  Gluc: 148 mg/dL / Ketone: x  / Bili: x / Urobili: x   Blood: x / Protein: x / Nitrite: x   Leuk Esterase: x / RBC: x / WBC x   Sq Epi: x / Non Sq Epi: x / Bacteria: x            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  Can give another  dose potassium chloride 40mEq x 1 dose  to keep K+ >4  Hypertensive urgency -resolved -BP acceptable at present  Pulm - resp failure - on bipap    s/p EEG  today - eval noted  Hypernatremia - Na+ much improved  on free water  as prescribed via NGT    1 Renal - scr stable at present .  no objection to lasix 40mg IV once PRN, in case of distress   continue to trend phos level daily   4 CV - BP acceptable at present  5 Vasc - s/p SMA stent placement;   6 Sx - s/p HIDA + for acute asa, medical management  7 GI - s/p EGD clipping of bleeding duodenal ulcers   8 Anemia- hgb improving, continue to trend H/H; suggest PRBC for Hgb <7.0; transfuse per primary team   9 Pulm -vent as per icu   10- ID -afebrile   11 Glycemic control as able         [facility]  [facility]  Office: [phone]       on vent , tolerating  bipap  afebrile      albuterol/ipratropium for Nebulization 3 milliLiter(s) Nebulizer every 6 hours  artificial  tears Solution 1 Drop(s) Left EYE four times a day  aspirin Suppository 300 milliGRAM(s) Rectal daily  buMETAnide Injectable 2 milliGRAM(s) IV Push every 12 hours  chlorhexidine 0.12% Liquid 15 milliLiter(s) Oral Mucosa every 12 hours  chlorhexidine 2% Cloths 1 Application(s) Topical daily  dexMEDEtomidine Infusion 0.2 MICROgram(s)/kG/Hr IV Continuous <Continuous>  dextrose 5%. 1000 milliLiter(s) IV Continuous <Continuous>  dextrose 5%. 1000 milliLiter(s) IV Continuous <Continuous>  dextrose 50% Injectable 25 Gram(s) IV Push once  dextrose 50% Injectable 25 Gram(s) IV Push once  dextrose 50% Injectable 12.5 Gram(s) IV Push once  dextrose Oral Gel 15 Gram(s) Oral once PRN  escitalopram 10 milliGRAM(s) Oral daily  glucagon  Injectable 1 milliGRAM(s) IntraMuscular once  heparin   Injectable 5000 Unit(s) SubCutaneous every 8 hours  insulin glargine Injectable (LANTUS) 15 Unit(s) SubCutaneous <User Schedule>  insulin lispro (ADMELOG) corrective regimen sliding scale   SubCutaneous every 6 hours  levETIRAcetam  IVPB 500 milliGRAM(s) IV Intermittent every 12 hours  lidocaine   4% Patch 1 Patch Transdermal every 24 hours  norepinephrine Infusion 0.05 MICROgram(s)/kG/Min IV Continuous <Continuous>  pantoprazole  Injectable 40 milliGRAM(s) IV Push every 12 hours  petrolatum Ophthalmic Ointment 1 Application(s) Left EYE at bedtime  propofol Infusion 30.055 MICROgram(s)/kG/Min IV Continuous <Continuous>  sodium chloride 3%  Inhalation 4 milliLiter(s) Inhalation every 6 hours  sucralfate suspension 1 Gram(s) Enteral Tube two times a day  valproate sodium  IVPB 600 milliGRAM(s) IV Intermittent every 6 hours      VITAL:  T(C): , Max: 37.2 (02-04-24 @ 08:00)  T(F): , Max: 98.9 (02-04-24 @ 08:00)  HR: 88 (02-04-24 @ 08:00)  BP: 138/60 (02-04-24 @ 08:00)  BP(mean): 79 (02-04-24 @ 08:00)  RR: 14 (02-04-24 @ 08:00)  SpO2: 100% (02-04-24 @ 08:00)  Wt(kg): --    02-03-24 @ 07:01  -  02-04-24 @ 07:00  --------------------------------------------------------  IN: 3227.8 mL / OUT: 4555 mL / NET: -1327.2 mL    02-04-24 @ 07:01  -  02-04-24 @ 08:30  --------------------------------------------------------  IN: 110.8 mL / OUT: 400 mL / NET: -289.2 mL        PHYSICAL EXAM:  Constitutional: no distress   HEENT: on bipap  Neck: No LAD, No JVD  Respiratory: diminished b/l  Cardiovascular: S1 and S2  Gastrointestinal: BS+, soft, NT/ND  Extremities: + l/e edema  Neurological: UTO  : + Moss  Skin: No rashes     LABS:                          7.4    7.13  )-----------( 369      ( 04 Feb 2024 02:46 )             24.0     Na(144)/K(3.7)/Cl(103)/HCO3(31)/BUN(32)/Cr(1.87)Glu(148)/Ca(8.0)/Mg(2.10)/PO4(3.4)    02-04 @ 02:46  Na(147)/K(3.7)/Cl(107)/HCO3(30)/BUN(35)/Cr(1.84)Glu(223)/Ca(8.1)/Mg(2.10)/PO4(2.7)    02-03 @ 10:40  Na(151)/K(3.2)/Cl(110)/HCO3(30)/BUN(35)/Cr(1.89)Glu(94)/Ca(8.2)/Mg(2.30)/PO4(2.2)    02-03 @ 04:24  Na(146)/K(4.0)/Cl(108)/HCO3(25)/BUN(37)/Cr(1.86)Glu(215)/Ca(8.3)/Mg(2.50)/PO4(3.7)    02-02 @ 18:21  Na(148)/K(4.0)/Cl(108)/HCO3(30)/BUN(39)/Cr(1.87)Glu(327)/Ca(8.4)/Mg(--)/PO4(--)    02-02 @ 11:59  Na(148)/K(4.2)/Cl(108)/HCO3(29)/BUN(39)/Cr(1.95)Glu(320)/Ca(8.5)/Mg(2.60)/PO4(4.2)    02-02 @ 06:19  Na(151)/K(4.2)/Cl(110)/HCO3(27)/BUN(39)/Cr(1.88)Glu(258)/Ca(8.3)/Mg(2.50)/PO4(4.5)    02-01 @ 21:34  Na(153)/K(4.5)/Cl(111)/HCO3(27)/BUN(40)/Cr(1.87)Glu(216)/Ca(8.4)/Mg(--)/PO4(--)    02-01 @ 16:29  Na(152)/K(4.7)/Cl(111)/HCO3(28)/BUN(42)/Cr(1.87)Glu(215)/Ca(8.5)/Mg(2.60)/PO4(4.7)    02-01 @ 12:05    Urinalysis Basic - ( 04 Feb 2024 02:46 )    Color: x / Appearance: x / SG: x / pH: x  Gluc: 148 mg/dL / Ketone: x  / Bili: x / Urobili: x   Blood: x / Protein: x / Nitrite: x   Leuk Esterase: x / RBC: x / WBC x   Sq Epi: x / Non Sq Epi: x / Bacteria: x            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  Can give another  dose potassium chloride 40mEq x 1 dose  to keep K+ >4  Hypertensive urgency -resolved -BP acceptable at present  Pulm - resp failure - on bipap    s/p EEG  today - eval noted  Hypernatremia - Na+ much improved  on free water  as prescribed via NGT    1 Renal - scr stable at present .  no objection to lasix 40mg IV once PRN, in case of distress   continue to trend phos level daily   4 CV - BP acceptable at present  5 Vasc - s/p SMA stent placement;   6 Sx - s/p HIDA + for acute asa, medical management  7 GI - s/p EGD clipping of bleeding duodenal ulcers , plan for PEG   8 Anemia- hgb improving, continue to trend H/H; suggest PRBC for Hgb <7.0; transfuse per primary team   9 Pulm -vent as per icu   10- ID -afebrile   11 Glycemic control as able         Elsamayela Nunez  [facility]  Office: [phone]

## 2024-02-06 NOTE — PROGRESS NOTE ADULT - SUBJECTIVE AND OBJECTIVE BOX
***************************************************************  Candelario (Renee) Shantel PGY2  [facility]  ***************************************************************    SHAIK CHARANJIT  89y  MRN: [id number]    Patient is a 89y old  Male who presents with a chief complaint of COVID19, Chest pain (05 Feb 2024 15:49)      Subjective: no events ON. Denies fever, CP, SOB, abn pain, N/V, dysuria. Tolerating diet.      MEDICATIONS  (STANDING):  albuterol/ipratropium for Nebulization 3 milliLiter(s) Nebulizer every 6 hours  artificial  tears Solution 1 Drop(s) Left EYE four times a day  aspirin  chewable 81 milliGRAM(s) Enteral Tube daily  chlorhexidine 0.12% Liquid 15 milliLiter(s) Oral Mucosa every 12 hours  chlorhexidine 2% Cloths 1 Application(s) Topical daily  dexMEDEtomidine Infusion 0.2 MICROgram(s)/kG/Hr (3.97 mL/Hr) IV Continuous <Continuous>  dextrose 5%. 1000 milliLiter(s) (50 mL/Hr) IV Continuous <Continuous>  dextrose 5%. 1000 milliLiter(s) (100 mL/Hr) IV Continuous <Continuous>  dextrose 50% Injectable 25 Gram(s) IV Push once  dextrose 50% Injectable 25 Gram(s) IV Push once  dextrose 50% Injectable 12.5 Gram(s) IV Push once  escitalopram 10 milliGRAM(s) Oral daily  glucagon  Injectable 1 milliGRAM(s) IntraMuscular once  heparin   Injectable 5000 Unit(s) SubCutaneous every 8 hours  insulin glargine Injectable (LANTUS) 15 Unit(s) SubCutaneous <User Schedule>  insulin lispro (ADMELOG) corrective regimen sliding scale   SubCutaneous every 6 hours  levETIRAcetam  IVPB 500 milliGRAM(s) IV Intermittent every 12 hours  lidocaine   4% Patch 1 Patch Transdermal every 24 hours  norepinephrine Infusion 0.05 MICROgram(s)/kG/Min (3.72 mL/Hr) IV Continuous <Continuous>  pantoprazole  Injectable 40 milliGRAM(s) IV Push every 12 hours  petrolatum Ophthalmic Ointment 1 Application(s) Left EYE at bedtime  sodium chloride 3%  Inhalation 4 milliLiter(s) Inhalation two times a day  sucralfate suspension 1 Gram(s) Enteral Tube two times a day    MEDICATIONS  (PRN):  dextrose Oral Gel 15 Gram(s) Oral once PRN Blood Glucose LESS THAN 70 milliGRAM(s)/deciliter      Objective:    Vitals: Vital Signs Last 24 Hrs  T(C): 36.9 (02-06-24 @ 04:00), Max: 37.8 (02-05-24 @ 12:00)  T(F): 98.5 (02-06-24 @ 04:00), Max: 100 (02-05-24 @ 12:00)  HR: 90 (02-06-24 @ 07:00) (85 - 95)  BP: 123/48 (02-06-24 @ 07:00) (90/36 - 146/57)  BP(mean): 65 (02-06-24 @ 07:00) (50 - 87)  RR: 13 (02-06-24 @ 07:00) (12 - 21)  SpO2: 99% (02-06-24 @ 07:00) (94% - 100%)            I&O's Summary    05 Feb 2024 07:01  -  06 Feb 2024 07:00  --------------------------------------------------------  IN: 2068.6 mL / OUT: 1405 mL / NET: 663.6 mL        PHYSICAL EXAM:  GENERAL: NAD  HEAD:  Atraumatic, Normocephalic  EYES: EOMI, conjunctiva and sclera clear  CHEST/LUNG: Clear to auscultation bilaterally; No rales, rhonchi, wheezing, or rubs  HEART: Regular rate and rhythm; No murmurs, rubs, or gallops  ABDOMEN: Soft, Nontender, Nondistended;   SKIN: No rashes or lesions  NERVOUS SYSTEM:  Alert & Oriented X3, no focal deficits    LABS:  02-06    140  |  103  |  33<H>  ----------------------------<  108<H>  3.9   |  27  |  1.58<H>  02-05    139  |  99  |  34<H>  ----------------------------<  140<H>  3.9   |  30  |  1.73<H>  02-04    139  |  99  |  33<H>  ----------------------------<  241<H>  3.6   |  30  |  1.75<H>    Ca    7.7<L>      06 Feb 2024 00:40  Ca    7.9<L>      05 Feb 2024 01:58  Ca    7.8<L>      04 Feb 2024 12:57  Phos  3.3     02-06  Mg     1.90     02-06    TPro  5.9<L>  /  Alb  2.1<L>  /  TBili  0.2  /  DBili  x   /  AST  19  /  ALT  19  /  AlkPhos  93  02-06  TPro  6.2  /  Alb  2.2<L>  /  TBili  0.2  /  DBili  x   /  AST  24  /  ALT  29  /  AlkPhos  110  02-05  TPro  6.0  /  Alb  2.2<L>  /  TBili  0.2  /  DBili  x   /  AST  29  /  ALT  37  /  AlkPhos  110  02-04      PT/INR - ( 06 Feb 2024 00:40 )   PT: 11.5 sec;   INR: 1.02 ratio         PTT - ( 06 Feb 2024 00:40 )  PTT:31.4 sec              Urinalysis Basic - ( 06 Feb 2024 00:40 )    Color: x / Appearance: x / SG: x / pH: x  Gluc: 108 mg/dL / Ketone: x  / Bili: x / Urobili: x   Blood: x / Protein: x / Nitrite: x   Leuk Esterase: x / RBC: x / WBC x   Sq Epi: x / Non Sq Epi: x / Bacteria: x      ABG - ( 06 Feb 2024 00:40 )  pH, Arterial: 7.44  pH, Blood: x     /  pCO2: 48    /  pO2: 136   / HCO3: 33    / Base Excess: 7.6   /  SaO2: 99.0                                    7.2    7.33  )-----------( 308      ( 06 Feb 2024 00:40 )             22.6                         7.7    8.14  )-----------( 357      ( 05 Feb 2024 01:58 )             24.1                         7.4    7.13  )-----------( 369      ( 04 Feb 2024 02:46 )             24.0     CAPILLARY BLOOD GLUCOSE      POCT Blood Glucose.: 100 mg/dL (06 Feb 2024 05:23)  POCT Blood Glucose.: 160 mg/dL (05 Feb 2024 23:11)  POCT Blood Glucose.: 159 mg/dL (05 Feb 2024 17:12)  POCT Blood Glucose.: 214 mg/dL (05 Feb 2024 12:06)      RADIOLOGY & ADDITIONAL TESTS:    Imaging Personally Reviewed:  [x ] YES  [ ] NO    Consultants involved in case:   Consultant(s) Notes Reviewed:  [ x] YES  [ ] NO:   Care Discussed with Consultants/Other Providers [x ] YES  [ ] NO         ***************************************************************  Candelario (MunirNavjot) Shantel PGY2  [facility]  ***************************************************************    SHAIK CHARANJIT  89y  MRN: [id number]    Patient is a 89y old  Male who presents with a chief complaint of COVID19, Chest pain (05 Feb 2024 15:49)      Subjective: NAEO. More alert today, tracking in room.     MEDICATIONS  (STANDING):  albuterol/ipratropium for Nebulization 3 milliLiter(s) Nebulizer every 6 hours  artificial  tears Solution 1 Drop(s) Left EYE four times a day  aspirin  chewable 81 milliGRAM(s) Enteral Tube daily  chlorhexidine 0.12% Liquid 15 milliLiter(s) Oral Mucosa every 12 hours  chlorhexidine 2% Cloths 1 Application(s) Topical daily  dexMEDEtomidine Infusion 0.2 MICROgram(s)/kG/Hr (3.97 mL/Hr) IV Continuous <Continuous>  dextrose 5%. 1000 milliLiter(s) (50 mL/Hr) IV Continuous <Continuous>  dextrose 5%. 1000 milliLiter(s) (100 mL/Hr) IV Continuous <Continuous>  dextrose 50% Injectable 25 Gram(s) IV Push once  dextrose 50% Injectable 25 Gram(s) IV Push once  dextrose 50% Injectable 12.5 Gram(s) IV Push once  escitalopram 10 milliGRAM(s) Oral daily  glucagon  Injectable 1 milliGRAM(s) IntraMuscular once  heparin   Injectable 5000 Unit(s) SubCutaneous every 8 hours  insulin glargine Injectable (LANTUS) 15 Unit(s) SubCutaneous <User Schedule>  insulin lispro (ADMELOG) corrective regimen sliding scale   SubCutaneous every 6 hours  levETIRAcetam  IVPB 500 milliGRAM(s) IV Intermittent every 12 hours  lidocaine   4% Patch 1 Patch Transdermal every 24 hours  norepinephrine Infusion 0.05 MICROgram(s)/kG/Min (3.72 mL/Hr) IV Continuous <Continuous>  pantoprazole  Injectable 40 milliGRAM(s) IV Push every 12 hours  petrolatum Ophthalmic Ointment 1 Application(s) Left EYE at bedtime  sodium chloride 3%  Inhalation 4 milliLiter(s) Inhalation two times a day  sucralfate suspension 1 Gram(s) Enteral Tube two times a day    MEDICATIONS  (PRN):  dextrose Oral Gel 15 Gram(s) Oral once PRN Blood Glucose LESS THAN 70 milliGRAM(s)/deciliter      Objective:    Vitals: Vital Signs Last 24 Hrs  T(C): 36.9 (02-06-24 @ 04:00), Max: 37.8 (02-05-24 @ 12:00)  T(F): 98.5 (02-06-24 @ 04:00), Max: 100 (02-05-24 @ 12:00)  HR: 90 (02-06-24 @ 07:00) (85 - 95)  BP: 123/48 (02-06-24 @ 07:00) (90/36 - 146/57)  BP(mean): 65 (02-06-24 @ 07:00) (50 - 87)  RR: 13 (02-06-24 @ 07:00) (12 - 21)  SpO2: 99% (02-06-24 @ 07:00) (94% - 100%)            I&O's Summary    05 Feb 2024 07:01  -  06 Feb 2024 07:00  --------------------------------------------------------  IN: 2068.6 mL / OUT: 1405 mL / NET: 663.6 mL        PHYSICAL EXAM:  GENERAL: NAD  HEAD:  Atraumatic, Normocephalic  EYES: EOMI, conjunctiva and sclera clear  CHEST/LUNG: Clear to auscultation bilaterally; No rales, rhonchi, wheezing, or rubs  HEART: Regular rate and rhythm; No murmurs, rubs, or gallops  ABDOMEN: Soft, Nontender, Nondistended;   SKIN: No rashes or lesions, edema 1+ to b/l knees  NERVOUS SYSTEM:  Alert & Oriented X0, does not follow commands but more alert and tracks in room, opens eyes    LABS:  02-06    140  |  103  |  33<H>  ----------------------------<  108<H>  3.9   |  27  |  1.58<H>  02-05    139  |  99  |  34<H>  ----------------------------<  140<H>  3.9   |  30  |  1.73<H>  02-04    139  |  99  |  33<H>  ----------------------------<  241<H>  3.6   |  30  |  1.75<H>    Ca    7.7<L>      06 Feb 2024 00:40  Ca    7.9<L>      05 Feb 2024 01:58  Ca    7.8<L>      04 Feb 2024 12:57  Phos  3.3     02-06  Mg     1.90     02-06    TPro  5.9<L>  /  Alb  2.1<L>  /  TBili  0.2  /  DBili  x   /  AST  19  /  ALT  19  /  AlkPhos  93  02-06  TPro  6.2  /  Alb  2.2<L>  /  TBili  0.2  /  DBili  x   /  AST  24  /  ALT  29  /  AlkPhos  110  02-05  TPro  6.0  /  Alb  2.2<L>  /  TBili  0.2  /  DBili  x   /  AST  29  /  ALT  37  /  AlkPhos  110  02-04      PT/INR - ( 06 Feb 2024 00:40 )   PT: 11.5 sec;   INR: 1.02 ratio         PTT - ( 06 Feb 2024 00:40 )  PTT:31.4 sec              Urinalysis Basic - ( 06 Feb 2024 00:40 )    Color: x / Appearance: x / SG: x / pH: x  Gluc: 108 mg/dL / Ketone: x  / Bili: x / Urobili: x   Blood: x / Protein: x / Nitrite: x   Leuk Esterase: x / RBC: x / WBC x   Sq Epi: x / Non Sq Epi: x / Bacteria: x      ABG - ( 06 Feb 2024 00:40 )  pH, Arterial: 7.44  pH, Blood: x     /  pCO2: 48    /  pO2: 136   / HCO3: 33    / Base Excess: 7.6   /  SaO2: 99.0                                    7.2    7.33  )-----------( 308      ( 06 Feb 2024 00:40 )             22.6                         7.7    8.14  )-----------( 357      ( 05 Feb 2024 01:58 )             24.1                         7.4    7.13  )-----------( 369      ( 04 Feb 2024 02:46 )             24.0     CAPILLARY BLOOD GLUCOSE      POCT Blood Glucose.: 100 mg/dL (06 Feb 2024 05:23)  POCT Blood Glucose.: 160 mg/dL (05 Feb 2024 23:11)  POCT Blood Glucose.: 159 mg/dL (05 Feb 2024 17:12)  POCT Blood Glucose.: 214 mg/dL (05 Feb 2024 12:06)      RADIOLOGY & ADDITIONAL TESTS:    Imaging Personally Reviewed:  [x ] YES  [ ] NO    Consultants involved in case:   Consultant(s) Notes Reviewed:  [ x] YES  [ ] NO:   Care Discussed with Consultants/Other Providers [x ] YES  [ ] NO

## 2024-02-06 NOTE — PROGRESS NOTE ADULT - ASSESSMENT
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     PLAN  =====Neurologic=====  #CVA  - prior CVA x3 with no residual deficits  #myoclonic jerks  - on Keppra 500 mg BID, per neuro wishing to wean however per family request will continue at 500 mg BID, but now off valproate per neuro    - also per neuro, use precedex as needed for clinical sedation as opposed to propofol   - holding home  gabapentin and memantine in setting of somnolence  - continue lexapro   - f/u 24 hour EEG  - CT/CTA negative for stroke   - MRI brain when stable and off EEG - will need to evaluate PPM for compatibility       =====Pulmonary=====  #COVID  - s/p paxlovid and 1 dose remdesivir while inpatient  #Pleural effusions b/l  - CT chest from 1/16 showing new small bilateral pleural effusions/ atelectasis/ patchy bilateral ground-glass opacities are consistent with pulmonary edema. Repeat XR w/ 2/2 findings c/f edema as well  - s/p zosyn for aspiration PNA from 1/20- 1/28   - POCUS with resolution of b/l pleural effusions, still holding brilinta for possible reaccumulation requiring pleural tap and/or other procedure ie trach/PEG.       =====Cardiovascular=====  On small dose of pressors, wean as tolerated  #HTN  - on home amlodipine and metoprolol currently holding     #CAD  - on Brilinta (holding) aspirin and ranolazine continue   - as per vascular okay to hold Brilinta for possible pleural effusion thoracentesis  -have not heard back from cardiology regarding Brilinta / last stent 2022 , will hold   - on asa suppository -> To oral as pt w/ oral access now    #Afib  - pt with known history of pAF, first observed 2/1  - can consider starting AC and/or rate control if persists     =====GI=====  #upper GI bleed  - s/p EGD showing dieulafoy lesion and esophagitis likely 2/2 NGT irritation s/p 2 clips  - on protonix IV BID continue   - CT on 1/25 with cholelithiasis and sludge HIDA on 1/26 confirming likely acute choley, s/p IR perc cholecystostomy 1/26   - plan for PEG tube per familys GOC once fever free for 48h per GI    #Diet  - tube feeds NGT     =====Renal/=====  #Electrolytes  - Maintain K>4, Phos>3, Mag>2, iCal>1  - baseline cr 1.5, at baseline  - on free water flushes for hyperNa. Trend BMP/Na   - Pt w/ contraction alkalosis due to overdiuresis, CTM, dose diamox to keep net negative    =====Endocrine=====  #DM2  - on lantus 15U and q6 SSI  - a1c 9.3, glucose wnl inpatient       =====Infectious Disease=====  #COVID   - s/p paxlovid and 1 dose remdesivir  # PNA  - CT chest showing ground glass opacities  - s/p zosyn  - intermittent episodes of fevers, likely source GI given choleycystitis? culture NGTD  - meropenem 5d course thru 2/1   - now monitoring off antibiotics    =====Heme/Onc=====  #DVT Ppx  - heparin sub-q    =====Ethics=====  FULL CODE.  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     PLAN  =====Neurologic=====  #CVA  - prior CVA x3 with no residual deficits  #myoclonic jerks  - on Keppra 500 mg BID, per neuro wishing to wean however per family request will continue at 500 mg BID, but now off valproate per neuro    - also per neuro, use precedex as needed for clinical sedation as opposed to propofol   - holding home  gabapentin and memantine in setting of somnolence  - continue lexapro   - CT/CTA negative for stroke  - EEG now off   - MRI brain when stable and off EEG - discussed w/ EP, PPM compatible. Safety screening form done.       =====Pulmonary=====  #COVID  - s/p paxlovid and 1 dose remdesivir while inpatient  #Pleural effusions b/l  - CT chest from 1/16 showing new small bilateral pleural effusions/ atelectasis/ patchy bilateral ground-glass opacities are consistent with pulmonary edema. Repeat XR w/ 2/2 findings c/f edema as well  - s/p zosyn for aspiration PNA from 1/20- 1/28   - POCUS with resolution of b/l pleural effusions, still holding brilinta for possible procedure.  - Discussed w/ family, defer trach/PEG until MRI brain done      =====Cardiovascular=====  On small dose of pressors, wean as tolerated  #HTN  - on home amlodipine and metoprolol currently holding     #CAD  - on Brilinta (holding) aspirin and ranolazine continue   - as per vascular okay to hold Brilinta for possible pleural effusion thoracentesis  -have not heard back from cardiology regarding Brilinta / last stent 2022 , will hold   - on asa suppository -> To oral as pt w/ oral access now    #Afib  - pt with known history of pAF, first observed 2/1  - can consider starting AC and/or rate control if persists     =====GI=====  #upper GI bleed  - s/p EGD showing dieulafoy lesion and esophagitis likely 2/2 NGT irritation s/p 2 clips  - on protonix IV BID continue   - CT on 1/25 with cholelithiasis and sludge HIDA on 1/26 confirming likely acute choley, s/p IR perc cholecystostomy 1/26   - Discussed w/ family, defer trach/PEG until MRI brain done    #Diet  - tube feeds NGT     =====Renal/=====  #Electrolytes  - Maintain K>4, Phos>3, Mag>2, iCal>1  - baseline cr 1.5, at baseline  - on free water flushes for hyperNa. Trend BMP/Na   - Pt w/ contraction alkalosis due to overdiuresis, CTM, dose diamox to keep net negative    =====Endocrine=====  #DM2  - on lantus 12U and q6 SSI  - a1c 9.3, glucose wnl inpatient       =====Infectious Disease=====  #COVID   - s/p paxlovid and 1 dose remdesivir  # PNA  - CT chest showing ground glass opacities  - s/p zosyn  - intermittent episodes of fevers, likely source GI given choleycystitis? culture NGTD  - meropenem 5d course thru 2/1   - now monitoring off antibiotics    =====Heme/Onc=====  #DVT Ppx  - heparin sub-q    =====Ethics=====  FULL CODE.  After family discussion, amenable to MRI first, depending on severity of findings, will then consider trach/PEG.

## 2024-02-06 NOTE — PROGRESS NOTE ADULT - ASSESSMENT
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 1/29 GI reconsulted for consideration for peg, initially deferred pending optimization from respiratory standpoint. Pt extubated and since reintubated, now on pressors and planned for trach by IP per primary team. GI reconsulted for peg.    # oropharyngeal dysphagia   - 1/29 seen by GI for consideration of PEG, deferred at that as pt not optimized from respiratory standpoint, though prior conversations had w/ family regarding risks of PEG placement, both related to sedation and placement, including, but not limited to infection, bleeding, injury to adjacent organs (or misplacement), leakage, peritonitis if pulled prior to stoma tract healing, buried bumper syndrome, continued aspiration, lack of safe window which would preclude placement, etc; 2/6    # acute hypoxemic respiratory failure/multifocal pna   - reintubated, planned for trach  # shock on pressors   # encephalopathy   - most likely metabolic encephalopathy vs sz vs vascular etiology as per neuro  # acute cholecystitis, sp IR perc asa 1/26  # prior UGIB/normocytic anemia - resolved, sp EGD - 2/2 Dieulafoy lesion s/p clipping and NGT trauma s/p clipping  # s/p SMA angiography with stent placement with diagnostic laparoscopy 12/24, currently only on asa, brillinta dcd on 1/24  # hx of CAD s/p CABG s/p stents (last stent May 2022)/new AF/mild to mod AS on echo  # hx of PPM (last interrogated 1/2024)      Recommendations-      89M with history of CAD s/p CABG s/p stents (last stent May 2022), s/p PPM, DM2, CKD (baseline Cr 1.2-1.3 as per family), PVD, HTN, HLD, CVA x3 (without residual deficits), and 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 1/29 GI reconsulted for consideration for peg, initially deferred pending optimization from respiratory standpoint. Pt extubated and since reintubated, now on pressors and planned for trach by IP per primary team- though now on hold pending MRI. GI reconsulted for peg.    # oropharyngeal dysphagia   - 1/29 seen by GI for consideration of PEG, deferred at that as pt not optimized from respiratory standpoint, though prior conversations had w/ family regarding risks of PEG placement, both related to sedation and placement, including, but not limited to infection, bleeding, injury to adjacent organs (or misplacement), leakage, peritonitis if pulled prior to stoma tract healing, buried bumper syndrome, continued aspiration, lack of safe window which would preclude placement, etc; 2/6 dtr at bedside, states PEG remains within GOC  # acute hypoxemic respiratory failure/multifocal pna   - reintubated, possible plan for eventual trach pending MRI findings  # shock on pressors   # encephalopathy   - most likely metabolic encephalopathy vs sz vs vascular etiology as per neuro  - pending MRI  # acute cholecystitis, sp IR perc asa 1/26  # prior UGIB/normocytic anemia - resolved, sp EGD - 2/2 Dieulafoy lesion s/p clipping and NGT trauma s/p clipping  # s/p SMA angiography with stent placement with diagnostic laparoscopy 12/24, currently only on asa, brillinta dcd on 1/24  # hx of CAD s/p CABG s/p stents (last stent May 2022)/new AF/mild to mod AS on echo  # hx of PPM (last interrogated 1/2024)      Recommendations-   - per further dw team trach cancelled today as family would like MRI prior  - follow up MRI/neuro recommendations/plans for potential future trach  - can plan for eventual PEG as per familys wishes pending above/clinical course  - continue NGT feeds in interim  - rest of care as per MICU    dw dtr at bedside  dw gi attg, micu resident    HUBER Whitley PA-C, RD, CDN  available on TEAMS for questions  after 5pm/weekends, please contact the on-call GI fellow at [phone]

## 2024-02-06 NOTE — PROGRESS NOTE ADULT - ATTENDING COMMENTS
Patient seen and examined at bedside. During follow up visit, son was present. I appreciate that.     Detailed neuro exam:  ROS: Unable to obtain due to the patient is currently orally intubated.  Please note, neuro exam is very limited due to the patient on mechanical ventilation via orally intubated.  General: Patient is comfortably lying on bed without any acute distress.  Mental status: On verbal command, intermittently follows simple commands but unable to follow any complex commands.  Cranial exams: Brainstem reflexes are intact cornea, conjunctiva and gag reflexes. Face looks symmetric. Pupils are symmetric, reactive to light bilaterally.  Power: On noxious stimuli the patient had 4/5 bilateral four extremities.  Sensation: On noxious stimuli patient grimace at all four extremities.  Coordination and gait: Patient did not participate due to the mental status.     Impression and plan:   Mr. 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   Neurology consulted because of altered mental status. Etiology of altered mental status is unknown but most likely the metabolic encephalopathy  versus seizure versus vascular etiology.   Continue Keppra 500 mg BID per renal dose.   Patient would benefit from MRI brain with and without contrast once patient stable.  Discontinue EEG  Continue medical management, neuro- check and fall precaution.  GI and DVT prophylaxis.    Patient is at high risk for complications and morbidity or mortality. There is high probability of imminent or life threatening deterioration in the patient's condition.  Patient is unable or incompetent to participate in giving a history and/or making decisions and discussion is necessary for determining treatment decisions.    I discussed the diagnosis, treatment plan and prognosis with the patient's family. Risk benefit and alternatives to the treatment were discussed. All questions and concerns were addressed. The patient's family demonstrated good understanding of the treatment plan.    My cumulative time taking care of this critically ill patient is 40 minutes  If you have any further questions, please do not hesitate to contact our team.  Thank you for allowing us to participate in this patient care.

## 2024-02-06 NOTE — PROGRESS NOTE ADULT - ATTENDING COMMENTS
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 Course c/b acute cholecystitis s/p perc asa 1/26 by IR    #Acute hypoxemic/hypercapnic respiratory failure  #Multifocal Pneumonia  # Shock on pressors  #Dysphagia  #Acute cholecystitis  #Encephalopathy  #Afib - New, intermittent  #SMA stent   #Petechial hemorrhages.   - Frequent aspiration noted clinically and on CT scans. now reintubated, unable to clear secretions. 2/2 to poor cough and mental status.   - EEG without sz, neuro following c/w keppra 500. C/W eeg for now. Pending MRI. Will need to ensure safety with MRI as patient has PPM  - S/P course of antibiotics for MSSA pna as well as aspiration. Continue to monitor off abx.   - C/W vent, adjust based on gas, s/p diamox yesterday, will trend based on blood gas  - S/P IR drainage of biliary tube, monitor output.   - new AFib, now on full ac at this time. In the setting of critical ilness, currently being monitored on tele.   - C/W ASA, Brilinta on hold given hx of stent. Per vascular can hold temporarily. Will restart soon as patient is trached/PEG  - Seen by optho, continue to monitor  - oropharyngeal dysphagia will need peg given repeated aspiration  - shock on pressors, wean as tolerated. Likely 2/2 to vasoplegia.   - DVT ppx - SQH  - Dispo- family agreeable with trach and peg. Will consult IP for trach and GI for PEG. 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 Course c/b acute cholecystitis s/p perc asa 1/26 by IR    Patient failed trial of extubation, also possible sz. More awake today. Was pending tracheostomy but family declined today and would want an MRI prior to assess for structural abn prior to tracheostomy.     #Acute hypoxemic/hypercapnic respiratory failure  #Multifocal Pneumonia  # Shock on pressors  #Dysphagia  #Acute cholecystitis  #Encephalopathy  #Afib - New, intermittent  #SMA stent   #Petechial hemorrhages.   - Frequent aspiration noted clinically and on CT scans. now reintubated, unable to clear secretions. 2/2 to poor cough and mental status.   - EEG now without sz, neuro following c/w keppra 500. D/C eeg. Pending MRI. Was cleared by EP for MRI.   - S/P course of antibiotics for MSSA pna as well as aspiration. Continue to monitor off abx.   - C/W vent, adjust based on gas, s/p diamox yesterday, will trend based on blood gas. Will diurese PRN  - S/P IR drainage of biliary tube, monitor output.   - new AFib, not on full ac at this time. was In the setting of critical ilness, currently being monitored on tele and NSR.  - C/W ASA, Brilinta on hold for possible procedure. Per vascular can hold temporarily. Will restart soon as patient is trached/PEG.  - Seen by optho, continue to monitor  - oropharyngeal dysphagia will need peg given repeated aspiration/ GI reconsulted.   - shock on pressors, wean as tolerated. Likely 2/2 to vasoplegia.   - Was pending trach and PEG but family now awaiting results of the MRI prior to decision.   - DVT ppx - SQH  - Dispo- family agreeable with trach and peg.

## 2024-02-06 NOTE — PROGRESS NOTE ADULT - SUBJECTIVE AND OBJECTIVE BOX
Interval Events: GI reconsulted for peg  last seen 1/30  now reintubated, npo  afebrile, on levo  planned for trach by IP today as per resident      Allergies:  fluoroquinolone antibiotics (Other)  Cipro (Unknown)  Tegretol (Unknown)  carbamazepine (Other)      Hospital Medications:  albuterol/ipratropium for Nebulization 3 milliLiter(s) Nebulizer every 6 hours  artificial  tears Solution 1 Drop(s) Left EYE four times a day  aspirin  chewable 81 milliGRAM(s) Enteral Tube daily  chlorhexidine 0.12% Liquid 15 milliLiter(s) Oral Mucosa every 12 hours  chlorhexidine 2% Cloths 1 Application(s) Topical daily  dexMEDEtomidine Infusion 0.2 MICROgram(s)/kG/Hr IV Continuous <Continuous>  dextrose 5%. 1000 milliLiter(s) IV Continuous <Continuous>  dextrose 5%. 1000 milliLiter(s) IV Continuous <Continuous>  dextrose 50% Injectable 25 Gram(s) IV Push once  dextrose 50% Injectable 12.5 Gram(s) IV Push once  dextrose 50% Injectable 25 Gram(s) IV Push once  dextrose Oral Gel 15 Gram(s) Oral once PRN  escitalopram 10 milliGRAM(s) Oral daily  glucagon  Injectable 1 milliGRAM(s) IntraMuscular once  heparin   Injectable 5000 Unit(s) SubCutaneous every 8 hours  insulin glargine Injectable (LANTUS) 15 Unit(s) SubCutaneous <User Schedule>  insulin lispro (ADMELOG) corrective regimen sliding scale   SubCutaneous every 6 hours  levETIRAcetam  IVPB 500 milliGRAM(s) IV Intermittent every 12 hours  lidocaine   4% Patch 1 Patch Transdermal every 24 hours  norepinephrine Infusion 0.05 MICROgram(s)/kG/Min IV Continuous <Continuous>  pantoprazole  Injectable 40 milliGRAM(s) IV Push every 12 hours  petrolatum Ophthalmic Ointment 1 Application(s) Left EYE at bedtime  sodium chloride 3%  Inhalation 4 milliLiter(s) Inhalation two times a day  sucralfate suspension 1 Gram(s) Enteral Tube two times a day      ROS: unable to obtain from pt    Vital Signs:  Vital Signs Last 24 Hrs  T(C): 37.3 (06 Feb 2024 08:00), Max: 37.8 (05 Feb 2024 12:00)  T(F): 99.1 (06 Feb 2024 08:00), Max: 100 (05 Feb 2024 12:00)  HR: 85 (06 Feb 2024 09:00) (85 - 94)  BP: 130/54 (06 Feb 2024 09:00) (90/36 - 146/57)  BP(mean): 73 (06 Feb 2024 09:00) (50 - 87)  RR: 14 (06 Feb 2024 09:00) (12 - 21)  SpO2: 100% (06 Feb 2024 09:00) (97% - 100%)    Parameters below as of 06 Feb 2024 08:00  Patient On (Oxygen Delivery Method): ventilator    O2 Concentration (%): 30  Daily     Daily       02-05-24 @ 07:01  -  02-06-24 @ 07:00  --------------------------------------------------------  IN: 2068.6 mL / OUT: 1405 mL / NET: 663.6 mL    02-06-24 @ 07:01  -  02-06-24 @ 09:29  --------------------------------------------------------  IN: 5.4 mL / OUT: 150 mL / NET: -144.6 mL        LABS:                        7.2    7.33  )-----------( 308      ( 06 Feb 2024 00:40 )             22.6     Mean Cell Volume: 91.9 fL (02-06-24 @ 00:40)    02-06    140  |  103  |  33<H>  ----------------------------<  108<H>  3.9   |  27  |  1.58<H>    Ca    7.7<L>      06 Feb 2024 00:40  Phos  3.3     02-06  Mg     1.90     02-06    TPro  5.9<L>  /  Alb  2.1<L>  /  TBili  0.2  /  DBili  x   /  AST  19  /  ALT  19  /  AlkPhos  93  02-06    LIVER FUNCTIONS - ( 06 Feb 2024 00:40 )  Alb: 2.1 g/dL / Pro: 5.9 g/dL / ALK PHOS: 93 U/L / ALT: 19 U/L / AST: 19 U/L / GGT: x           PT/INR - ( 06 Feb 2024 00:40 )   PT: 11.5 sec;   INR: 1.02 ratio         PTT - ( 06 Feb 2024 00:40 )  PTT:31.4 sec  Urinalysis Basic - ( 06 Feb 2024 00:40 )    Color: x / Appearance: x / SG: x / pH: x  Gluc: 108 mg/dL / Ketone: x  / Bili: x / Urobili: x   Blood: x / Protein: x / Nitrite: x   Leuk Esterase: x / RBC: x / WBC x   Sq Epi: x / Non Sq Epi: x / Bacteria: x                              7.2    7.33  )-----------( 308      ( 06 Feb 2024 00:40 )             22.6                         7.7    8.14  )-----------( 357      ( 05 Feb 2024 01:58 )             24.1                         7.4    7.13  )-----------( 369      ( 04 Feb 2024 02:46 )             24.0                         7.1    8.13  )-----------( 359      ( 03 Feb 2024 10:40 )             22.2       PHYSICAL EXAM  *****************************         Interval Events: GI reconsulted for peg  last seen 1/30  now reintubated, npo  afebrile, on levo  planned for trach by IP today as per resident    per [place] rn in afternoon trach cancelled today as family now wants to wait for mri  restarted on feeds  levo at 0.02      Allergies:  fluoroquinolone antibiotics (Other)  Cipro (Unknown)  Tegretol (Unknown)  carbamazepine (Other)      Hospital Medications:  albuterol/ipratropium for Nebulization 3 milliLiter(s) Nebulizer every 6 hours  artificial  tears Solution 1 Drop(s) Left EYE four times a day  aspirin  chewable 81 milliGRAM(s) Enteral Tube daily  chlorhexidine 0.12% Liquid 15 milliLiter(s) Oral Mucosa every 12 hours  chlorhexidine 2% Cloths 1 Application(s) Topical daily  dexMEDEtomidine Infusion 0.2 MICROgram(s)/kG/Hr IV Continuous <Continuous>  dextrose 5%. 1000 milliLiter(s) IV Continuous <Continuous>  dextrose 5%. 1000 milliLiter(s) IV Continuous <Continuous>  dextrose 50% Injectable 25 Gram(s) IV Push once  dextrose 50% Injectable 12.5 Gram(s) IV Push once  dextrose 50% Injectable 25 Gram(s) IV Push once  dextrose Oral Gel 15 Gram(s) Oral once PRN  escitalopram 10 milliGRAM(s) Oral daily  glucagon  Injectable 1 milliGRAM(s) IntraMuscular once  heparin   Injectable 5000 Unit(s) SubCutaneous every 8 hours  insulin glargine Injectable (LANTUS) 15 Unit(s) SubCutaneous <User Schedule>  insulin lispro (ADMELOG) corrective regimen sliding scale   SubCutaneous every 6 hours  levETIRAcetam  IVPB 500 milliGRAM(s) IV Intermittent every 12 hours  lidocaine   4% Patch 1 Patch Transdermal every 24 hours  norepinephrine Infusion 0.05 MICROgram(s)/kG/Min IV Continuous <Continuous>  pantoprazole  Injectable 40 milliGRAM(s) IV Push every 12 hours  petrolatum Ophthalmic Ointment 1 Application(s) Left EYE at bedtime  sodium chloride 3%  Inhalation 4 milliLiter(s) Inhalation two times a day  sucralfate suspension 1 Gram(s) Enteral Tube two times a day      ROS: unable to obtain from pt    Vital Signs:  Vital Signs Last 24 Hrs  T(C): 37.3 (06 Feb 2024 08:00), Max: 37.8 (05 Feb 2024 12:00)  T(F): 99.1 (06 Feb 2024 08:00), Max: 100 (05 Feb 2024 12:00)  HR: 85 (06 Feb 2024 09:00) (85 - 94)  BP: 130/54 (06 Feb 2024 09:00) (90/36 - 146/57)  BP(mean): 73 (06 Feb 2024 09:00) (50 - 87)  RR: 14 (06 Feb 2024 09:00) (12 - 21)  SpO2: 100% (06 Feb 2024 09:00) (97% - 100%)    Parameters below as of 06 Feb 2024 08:00  Patient On (Oxygen Delivery Method): ventilator    O2 Concentration (%): 30  Daily     Daily       02-05-24 @ 07:01  -  02-06-24 @ 07:00  --------------------------------------------------------  IN: 2068.6 mL / OUT: 1405 mL / NET: 663.6 mL    02-06-24 @ 07:01  -  02-06-24 @ 09:29  --------------------------------------------------------  IN: 5.4 mL / OUT: 150 mL / NET: -144.6 mL        LABS:                        7.2    7.33  )-----------( 308      ( 06 Feb 2024 00:40 )             22.6     Mean Cell Volume: 91.9 fL (02-06-24 @ 00:40)    02-06    140  |  103  |  33<H>  ----------------------------<  108<H>  3.9   |  27  |  1.58<H>    Ca    7.7<L>      06 Feb 2024 00:40  Phos  3.3     02-06  Mg     1.90     02-06    TPro  5.9<L>  /  Alb  2.1<L>  /  TBili  0.2  /  DBili  x   /  AST  19  /  ALT  19  /  AlkPhos  93  02-06    LIVER FUNCTIONS - ( 06 Feb 2024 00:40 )  Alb: 2.1 g/dL / Pro: 5.9 g/dL / ALK PHOS: 93 U/L / ALT: 19 U/L / AST: 19 U/L / GGT: x           PT/INR - ( 06 Feb 2024 00:40 )   PT: 11.5 sec;   INR: 1.02 ratio         PTT - ( 06 Feb 2024 00:40 )  PTT:31.4 sec  Urinalysis Basic - ( 06 Feb 2024 00:40 )    Color: x / Appearance: x / SG: x / pH: x  Gluc: 108 mg/dL / Ketone: x  / Bili: x / Urobili: x   Blood: x / Protein: x / Nitrite: x   Leuk Esterase: x / RBC: x / WBC x   Sq Epi: x / Non Sq Epi: x / Bacteria: x                              7.2    7.33  )-----------( 308      ( 06 Feb 2024 00:40 )             22.6                         7.7    8.14  )-----------( 357      ( 05 Feb 2024 01:58 )             24.1                         7.4    7.13  )-----------( 369      ( 04 Feb 2024 02:46 )             24.0                         7.1    8.13  )-----------( 359      ( 03 Feb 2024 10:40 )             22.2       PHYSICAL EXAM  GENERAL: lying in bed, chronically ill appearing  HEENT: NCAT, +NGT  CHEST:  on vent  ABDOMEN:  soft, distended, appears non-tender, +scars to abd  EXTREMITIES: WWP  SKIN:  warm/dry  PSYCH: sedated  NEURO: no tremor noted

## 2024-02-06 NOTE — EEG REPORT - NS EEG TEXT BOX
SHAIK DONOHUE [facility]-[id number]     Study Date: 2/5/24 08:00 - 2/6/24 10:14  Duration: 26 hr 12 min  --------------------------------------------------------------------------------------------------  History:  CC/ HPI Patient is a 89y old  Male who presents with a chief complaint of COVID19, Chest pain (06 Feb 2024 10:52)    MEDICATIONS  (STANDING):  albuterol/ipratropium for Nebulization 3 milliLiter(s) Nebulizer every 6 hours  artificial  tears Solution 1 Drop(s) Left EYE four times a day  aspirin  chewable 81 milliGRAM(s) Enteral Tube daily  chlorhexidine 0.12% Liquid 15 milliLiter(s) Oral Mucosa every 12 hours  chlorhexidine 2% Cloths 1 Application(s) Topical daily  dexMEDEtomidine Infusion 0.2 MICROgram(s)/kG/Hr (3.97 mL/Hr) IV Continuous <Continuous>  dextrose 5%. 1000 milliLiter(s) (100 mL/Hr) IV Continuous <Continuous>  dextrose 5%. 1000 milliLiter(s) (50 mL/Hr) IV Continuous <Continuous>  dextrose 50% Injectable 25 Gram(s) IV Push once  dextrose 50% Injectable 25 Gram(s) IV Push once  dextrose 50% Injectable 12.5 Gram(s) IV Push once  escitalopram 10 milliGRAM(s) Oral daily  glucagon  Injectable 1 milliGRAM(s) IntraMuscular once  heparin   Injectable 5000 Unit(s) SubCutaneous every 8 hours  insulin glargine Injectable (LANTUS) 12 Unit(s) SubCutaneous <User Schedule>  insulin lispro (ADMELOG) corrective regimen sliding scale   SubCutaneous every 6 hours  levETIRAcetam  IVPB 500 milliGRAM(s) IV Intermittent every 12 hours  lidocaine   4% Patch 1 Patch Transdermal every 24 hours  norepinephrine Infusion 0.05 MICROgram(s)/kG/Min (3.72 mL/Hr) IV Continuous <Continuous>  pantoprazole  Injectable 40 milliGRAM(s) IV Push every 12 hours  petrolatum Ophthalmic Ointment 1 Application(s) Left EYE at bedtime  sodium chloride 3%  Inhalation 4 milliLiter(s) Inhalation two times a day  sucralfate suspension 1 Gram(s) Enteral Tube two times a day    --------------------------------------------------------------------------------------------------  Study Interpretation:    [[[Abbreviation Key:  PDR=alpha rhythm/posterior dominant rhythm. A-P=anterior posterior.  Amplitude: ‘very low’:<20; ‘low’:20-49; ‘medium’:; ‘high’:>150uV.  Persistence for periodic/rhythmic patterns (% of epoch) ‘rare’:<1%; ‘occasional’:1-10%; ‘frequent’:10-50%; ‘abundant’:50-90%; ‘continuous’:>90%.  Persistence for sporadic discharges: ‘rare’:<1/hr; ‘occasional’:1/min-1/hr; ‘frequent’:>1/min; ‘abundant’:>1/10 sec.  RPP=rhythmic and periodic patterns; GRDA=generalized rhythmic delta activity; FIRDA=frontal intermittent GRDA; LRDA=lateralized rhythmic delta activity; TIRDA=temporal intermittent rhythmic delta activity;  LPD=PLED=lateralized periodic discharges; GPD=generalized periodic discharges; BIPDs =bilateral independent periodic discharges; Mf=multifocal; SIRPDs=stimulus induced rhythmic, periodic, or ictal appearing discharges; BIRDs=brief potentially ictal rhythmic discharges >4 Hz, lasting .5-10s; PFA (paroxysmal bursts >13 Hz or =8 Hz <10s).  Modifiers: +F=with fast component; +S=with spike component; +R=with rhythmic component.  S-B=burst suppression pattern.  Max=maximal. N1-drowsy; N2-stage II sleep; N3-slow wave sleep. SSS/BETS=small sharp spikes/benign epileptiform transients of sleep. HV=hyperventilation; PS=photic stimulation]]]    Daily EEG Visual Analysis    FINDINGS:      Background:  The background is continuous and symmetric. The predominant background consists of diffuse polymorphic delta with intermittent theta. There is no posterior dominant rhythm.     Background Slowing:  Generalized slowing: As above  Focal slowing: None    State Changes:   Drowsiness characterized by decrease in prevalence of GPDs and slowing and attenuation of the background activity with intermittent diffuse theta.  Stage II sleep architecture not attained.     Interictal Findings:  Abundant generalized periodic discharges (GPDs, frontally predominant generalized sharp waves, at times centrally predominant), occasionally with triphasic morphology, at ~1 to 1.5 Hz, reaching up to 2 Hz with arousal and stimulation without evolution. Patient at times makes spontaneous movements during GPDs.  Later in recording, these decrease to frequent sporadic generalized sharp waves, occasionally periodic or semi-periodic ~1 Hz.    Electrographic and Electroclinical seizures:  None    Other Clinical Events:  None reported.    Activation Procedures:   Hyperventilation is not performed.    Photic stimulation is not performed.    Artifacts:  Intermittent myogenic and movement artifacts are present.    EKG:  Single-lead EKG shows regular rhythm.    EEG Classification / Summary:  Abnormal video-EEG in a lethargic patient.  -GPDs initially at 1-2 Hz, at times with triphasic morphology, most prevalent with stimulation or arousal, decreasing in prevalence over the course of recording  -Moderate diffuse slowing  -No electrographic seizures    Clinical Impression:  -GPDs with triphasic morphology can be seen in toxic-metabolic encephalopathies and indicate risk of generalized seizures. Risk decreases over the course of recording.  -Moderate diffuse cerebral dysfunction is nonspecific in etiology.             -------------------------------------------------------------------------------------------------------    Karyn Escobar MD  Attending Physician, [facility]    -------------------------------------------------------------------------------------------------------    To reach EEG technologist:  Please use the pager number for the appropriate hospital or contact the .  At [facility] - Pager #: [phone]    To reach EEG-reading physician:  [facility] EEG Reading Room Phone #: [phone]  Epilepsy Answering Service after 5PM and before 8:30AM: Phone #: [phone]     SHAIK DONOHUE [facility]-[id number]     Study Date: 2/5/24 08:00 - 2/6/24 10:14  Duration: 26 hr 12 min  --------------------------------------------------------------------------------------------------  History:  CC/ HPI Patient is a 89y old  Male who presents with a chief complaint of COVID19, Chest pain (06 Feb 2024 10:52)    MEDICATIONS  (STANDING):  albuterol/ipratropium for Nebulization 3 milliLiter(s) Nebulizer every 6 hours  artificial  tears Solution 1 Drop(s) Left EYE four times a day  aspirin  chewable 81 milliGRAM(s) Enteral Tube daily  chlorhexidine 0.12% Liquid 15 milliLiter(s) Oral Mucosa every 12 hours  chlorhexidine 2% Cloths 1 Application(s) Topical daily  dexMEDEtomidine Infusion 0.2 MICROgram(s)/kG/Hr (3.97 mL/Hr) IV Continuous <Continuous>  dextrose 5%. 1000 milliLiter(s) (100 mL/Hr) IV Continuous <Continuous>  dextrose 5%. 1000 milliLiter(s) (50 mL/Hr) IV Continuous <Continuous>  dextrose 50% Injectable 25 Gram(s) IV Push once  dextrose 50% Injectable 25 Gram(s) IV Push once  dextrose 50% Injectable 12.5 Gram(s) IV Push once  escitalopram 10 milliGRAM(s) Oral daily  glucagon  Injectable 1 milliGRAM(s) IntraMuscular once  heparin   Injectable 5000 Unit(s) SubCutaneous every 8 hours  insulin glargine Injectable (LANTUS) 12 Unit(s) SubCutaneous <User Schedule>  insulin lispro (ADMELOG) corrective regimen sliding scale   SubCutaneous every 6 hours  levETIRAcetam  IVPB 500 milliGRAM(s) IV Intermittent every 12 hours  lidocaine   4% Patch 1 Patch Transdermal every 24 hours  norepinephrine Infusion 0.05 MICROgram(s)/kG/Min (3.72 mL/Hr) IV Continuous <Continuous>  pantoprazole  Injectable 40 milliGRAM(s) IV Push every 12 hours  petrolatum Ophthalmic Ointment 1 Application(s) Left EYE at bedtime  sodium chloride 3%  Inhalation 4 milliLiter(s) Inhalation two times a day  sucralfate suspension 1 Gram(s) Enteral Tube two times a day    --------------------------------------------------------------------------------------------------  Study Interpretation:    [[[Abbreviation Key:  PDR=alpha rhythm/posterior dominant rhythm. A-P=anterior posterior.  Amplitude: ‘very low’:<20; ‘low’:20-49; ‘medium’:; ‘high’:>150uV.  Persistence for periodic/rhythmic patterns (% of epoch) ‘rare’:<1%; ‘occasional’:1-10%; ‘frequent’:10-50%; ‘abundant’:50-90%; ‘continuous’:>90%.  Persistence for sporadic discharges: ‘rare’:<1/hr; ‘occasional’:1/min-1/hr; ‘frequent’:>1/min; ‘abundant’:>1/10 sec.  RPP=rhythmic and periodic patterns; GRDA=generalized rhythmic delta activity; FIRDA=frontal intermittent GRDA; LRDA=lateralized rhythmic delta activity; TIRDA=temporal intermittent rhythmic delta activity;  LPD=PLED=lateralized periodic discharges; GPD=generalized periodic discharges; BIPDs =bilateral independent periodic discharges; Mf=multifocal; SIRPDs=stimulus induced rhythmic, periodic, or ictal appearing discharges; BIRDs=brief potentially ictal rhythmic discharges >4 Hz, lasting .5-10s; PFA (paroxysmal bursts >13 Hz or =8 Hz <10s).  Modifiers: +F=with fast component; +S=with spike component; +R=with rhythmic component.  S-B=burst suppression pattern.  Max=maximal. N1-drowsy; N2-stage II sleep; N3-slow wave sleep. SSS/BETS=small sharp spikes/benign epileptiform transients of sleep. HV=hyperventilation; PS=photic stimulation]]]    Daily EEG Visual Analysis    FINDINGS:      Background:  The background is continuous and symmetric. The predominant background consists of diffuse polymorphic delta with intermittent theta. There is no posterior dominant rhythm.     Background Slowing:  Generalized slowing: As above  Focal slowing: None    State Changes:   Drowsiness characterized by decrease in prevalence of GPDs and slowing and attenuation of the background activity with intermittent diffuse theta.  Stage II sleep architecture not attained.     Interictal Findings:  Abundant generalized periodic discharges (GPDs, frontally predominant generalized sharp waves, at times centrally predominant), occasionally with triphasic morphology, at ~1 to 1.5 Hz, reaching up to 2 Hz with arousal and stimulation without evolution. Patient at times makes spontaneous movements during GPDs.  Later in recording, these decrease to frequent sporadic generalized sharp waves, occasionally periodic or semi-periodic ~1 Hz.    Electrographic and Electroclinical seizures:  None    Other Clinical Events:  None reported.    Activation Procedures:   Hyperventilation is not performed.    Photic stimulation is not performed.    Artifacts:  Intermittent myogenic and movement artifacts are present.    EKG:  Single-lead EKG shows regular rhythm. Limited by artifact.    EEG Classification / Summary:  Abnormal video-EEG in a lethargic patient.  -GPDs initially at 1-2 Hz, at times with triphasic morphology, most prevalent with stimulation or arousal, decreasing in prevalence over the course of recording  -Moderate diffuse slowing  -No electrographic seizures    Clinical Impression:  -GPDs with triphasic morphology can be seen in toxic-metabolic encephalopathies and indicate risk of generalized seizures. Risk decreases over the course of recording.  -Moderate diffuse cerebral dysfunction is nonspecific in etiology.             -------------------------------------------------------------------------------------------------------    Karyn Escobar MD  Attending Physician, [facility]    -------------------------------------------------------------------------------------------------------    To reach EEG technologist:  Please use the pager number for the appropriate hospital or contact the .  At [facility] - Pager #: [phone]    To reach EEG-reading physician:  [facility] EEG Reading Room Phone #: [phone]  Epilepsy Answering Service after 5PM and before 8:30AM: Phone #: [phone]

## 2024-02-06 NOTE — PROGRESS NOTE ADULT - NS ATTEND AMEND GEN_ALL_CORE FT
As above.    Patient seen and examined on 2/6/2024.  Discussed with PA earlier in the day.    Impression:    #1.  Reconsulted for consideration of PEG.,  Currently receiving NG tube feeds    #2.  Upper GI bleed with Dieulafoy lesion, status post endoscopic hemostasis 1/2/24    #3.  Recent SMA thrombus with plaque rupture, status post angiography with stent placement and diagnostic laparoscopy on 12/24/2023.    #4.  Coronary artery disease, status post CABG, status post stents last May 2022, status post pacemaker last interrogated January 2024.  Patient with elevated cardiopulmonary risk for anesthesia.    #5.  Respiratory failure due to multifocal pneumonia, on mechanical ventilation    #6.  Hypotension, on pressors    Recommendation:    #1.  Follow CBC    #2.  Await MRI of brain and neurology follow-up per family request    #3.  Family wishes to consider MRI of the brain results prior to consenting for tracheostomy    #4.  If tracheostomy is performed, would perform upper endoscopy with possible PEG, likely next day or 2 days later  Will need to monitor for complications such as bleeding    #5.  Continue pantoprazole 40 mg IV q12 hours. As above.    Patient seen and examined on 2/6/2024.  Discussed with PA earlier in the day.    Impression:    #1.  Reconsulted for consideration of PEG.,  Currently receiving NG tube feeds    #2.  Upper GI bleed with Dieulafoy lesion, status post endoscopic hemostasis 1/2/24    #3.  Recent SMA thrombus with plaque rupture, status post angiography with stent placement and diagnostic laparoscopy on 12/24/2023.    #4.  Coronary artery disease, status post CABG, status post stents last May 2022, status post pacemaker last interrogated January 2024.  Patient with elevated cardiopulmonary risk for anesthesia.    #5.  Respiratory failure due to multifocal pneumonia, on mechanical ventilation    #6.  Hypotension, on pressors    Recommendation:    #1.  Follow CBC    #2.  Await MRI of brain and neurology follow-up per family request    #3.  Family wishes to consider MRI of the brain results prior to consenting for tracheostomy    #4.  If tracheostomy is performed, would perform upper endoscopy with possible PEG, likely next day or 2 days later  Will need to monitor for complications such as bleeding    #5.  Continue pantoprazole 40 mg IV q12 hours.    Discussed with daughter at bedside.

## 2024-02-06 NOTE — PROGRESS NOTE ADULT - SUBJECTIVE AND OBJECTIVE BOX
Neurology Progress Note    SUBJECTIVE/OBJECTIVE/INTERVAL EVENTS: Patient seen and examined at bedside w/ neuro attending and team.     INTERVAL HISTORY:    REVIEW OF SYSTEMS: Otherwise denies fever, chills, headaches, vision changes, nausea, vomiting, hearing change, focal weakness, focal numbness, bowel/ bladder incontinence. Few questions of a 10-system ROS was performed and is negative except for those items noted above and/or in the HPI.    VITALS & EXAMINATION:  Vital Signs Last 24 Hrs  T(C): 37.3 (06 Feb 2024 08:00), Max: 37.8 (05 Feb 2024 12:00)  T(F): 99.1 (06 Feb 2024 08:00), Max: 100 (05 Feb 2024 12:00)  HR: 89 (06 Feb 2024 10:00) (85 - 94)  BP: 128/45 (06 Feb 2024 10:00) (90/36 - 146/57)  BP(mean): 66 (06 Feb 2024 10:00) (50 - 87)  RR: 13 (06 Feb 2024 10:00) (12 - 21)  SpO2: 100% (06 Feb 2024 10:00) (97% - 100%)    Parameters below as of 06 Feb 2024 09:38  Patient On (Oxygen Delivery Method): ventilator        General:  Constitutional: Male, appears stated age, nontoxic, not in distress,    Head: Normocephalic;   Eyes: clear sclera;   Extremities: No cyanosis;   Resp: breathing comfortably  Neck: no nuchal rigidity  Fundoscopic exam:      Neurological (>12):  MS: Awake, alert.  Oriented person place situation. Follows commands. Attends to examiner  Language: Speech is hypophonic, clear, fluent, good repetition,  comprehension, registration of words.  CNs: PERRL (R 3mm, L 3mm). VFF. EOMI. No disconjugate gaze, nystagmus. V1-3 intact LT, No facial asymmetry b/l. Hearing grossly normal b/l. Tongue midline and can move side to side.     Motor - Normal bulk and tone throughout. No pronator drift.    L/R (out of 5 each)       Deltoid  5/5    Biceps   5/5      Triceps  5/5         Wrist Extension 5/5   Wrist Flexion  5/5   Interossei 5/5     5/5   L/R (out of 5 each)       Hip Flexion  5/5    Hip Extension  5/5  Knee Extension  5/5  Dorsiflexion  5/5      Plantar Flexion 5/5     Sensation: Intact to LT b/l. Cortical: Extinction on DSS (neglect): none  Reflexes L/R:  Biceps(C5) 2/2  BR(C6) 2/2   Triceps(C7)  2/2 Patellar(L4)   2/2   Ankles 2/2   Toes: mute b/l  Coordination: No dysmetria to FTN b/l UE  Gait: Normal Romberg. No postural instability. Normal stance. Gait baseline.    LABORATORY:  CBC                       7.2    7.33  )-----------( 308      ( 06 Feb 2024 00:40 )             22.6     Chem 02-06    140  |  103  |  33<H>  ----------------------------<  108<H>  3.9   |  27  |  1.58<H>    Ca    7.7<L>      06 Feb 2024 00:40  Phos  3.3     02-06  Mg     1.90     02-06    TPro  5.9<L>  /  Alb  2.1<L>  /  TBili  0.2  /  DBili  x   /  AST  19  /  ALT  19  /  AlkPhos  93  02-06    LFTs LIVER FUNCTIONS - ( 06 Feb 2024 00:40 )  Alb: 2.1 g/dL / Pro: 5.9 g/dL / ALK PHOS: 93 U/L / ALT: 19 U/L / AST: 19 U/L / GGT: x           Coagulopathy PT/INR - ( 06 Feb 2024 00:40 )   PT: 11.5 sec;   INR: 1.02 ratio         PTT - ( 06 Feb 2024 00:40 )  PTT:31.4 sec  Lipid Panel   A1c   Cardiac enzymes     U/A Urinalysis Basic - ( 06 Feb 2024 00:40 )    Color: x / Appearance: x / SG: x / pH: x  Gluc: 108 mg/dL / Ketone: x  / Bili: x / Urobili: x   Blood: x / Protein: x / Nitrite: x   Leuk Esterase: x / RBC: x / WBC x   Sq Epi: x / Non Sq Epi: x / Bacteria: x      CSF  Immunological  Other    STUDIES & IMAGING: (EEG, CT, MR, U/S, TTE/RICHARD): Neurology Progress Note    SUBJECTIVE/OBJECTIVE/INTERVAL EVENTS: Patient seen and examined at bedside w/ neuro attending and team.     INTERVAL HISTORY: No acute events    REVIEW OF SYSTEMS: Otherwise denies fever, chills, headaches, vision changes, nausea, vomiting, hearing change, focal weakness, focal numbness, bowel/ bladder incontinence. Few questions of a 10-system ROS was performed and is negative except for those items noted above and/or in the HPI.    VITALS & EXAMINATION:  Vital Signs Last 24 Hrs  T(C): 37.3 (06 Feb 2024 08:00), Max: 37.8 (05 Feb 2024 12:00)  T(F): 99.1 (06 Feb 2024 08:00), Max: 100 (05 Feb 2024 12:00)  HR: 89 (06 Feb 2024 10:00) (85 - 94)  BP: 128/45 (06 Feb 2024 10:00) (90/36 - 146/57)  BP(mean): 66 (06 Feb 2024 10:00) (50 - 87)  RR: 13 (06 Feb 2024 10:00) (12 - 21)  SpO2: 100% (06 Feb 2024 10:00) (97% - 100%)    Parameters below as of 06 Feb 2024 09:38  Patient On (Oxygen Delivery Method): ventilator    Exam:  MS: Eyes open to verbal, tactile stimulation. Looks towards examiner on R + L, tracking and intermittently follows simple commands. Localizes with B arms in response to tactile stimuli.  CN:  No BTT.  Eyes in primary gaze - EOMI with OCR.  Corneal reflex is intact and symmetric.  Eyes close symmetrically and strongly.  Pupils round,  1.5mm-->1mm, B.   No abnormal movements.    Motor/sensory:  Tone: arms with cogwheel rigidity and increased voluntary tone vs. extensor posturing.   Legs w/d slightly to pain.        LABORATORY:  CBC                       7.2    7.33  )-----------( 308      ( 06 Feb 2024 00:40 )             22.6     Chem 02-06    140  |  103  |  33<H>  ----------------------------<  108<H>  3.9   |  27  |  1.58<H>    Ca    7.7<L>      06 Feb 2024 00:40  Phos  3.3     02-06  Mg     1.90     02-06    TPro  5.9<L>  /  Alb  2.1<L>  /  TBili  0.2  /  DBili  x   /  AST  19  /  ALT  19  /  AlkPhos  93  02-06    LFTs LIVER FUNCTIONS - ( 06 Feb 2024 00:40 )  Alb: 2.1 g/dL / Pro: 5.9 g/dL / ALK PHOS: 93 U/L / ALT: 19 U/L / AST: 19 U/L / GGT: x           Coagulopathy PT/INR - ( 06 Feb 2024 00:40 )   PT: 11.5 sec;   INR: 1.02 ratio         PTT - ( 06 Feb 2024 00:40 )  PTT:31.4 sec  Lipid Panel   A1c   Cardiac enzymes     U/A Urinalysis Basic - ( 06 Feb 2024 00:40 )    Color: x / Appearance: x / SG: x / pH: x  Gluc: 108 mg/dL / Ketone: x  / Bili: x / Urobili: x   Blood: x / Protein: x / Nitrite: x   Leuk Esterase: x / RBC: x / WBC x   Sq Epi: x / Non Sq Epi: x / Bacteria: x    STUDIES & IMAGING: (EEG, CT, MR, U/S, TTE/RICHARD):  1. No CT evidence ofan acute territorial infarct or hemorrhage, with   underlying volume loss. No hemorrhage or mass identified.  2. Extensive calcified plaque in the head and neck.  3. Moderate to severe narrowing left internal carotid at the origin.   Severe narrowingright internal carotid by a tandem lesion 1.5 cm above   the bifurcation with a narrowed internal carotid beyond the lesion.  4. Extensive calcified plaque both cavernous and supraclinoid carotid   arteries with narrowing but no occlusion. Mild narrowing proximal right   M1 segment. Moderate narrowing both vertebral V4 segments  5. No perfusion abnormality at the Tmax 6 second threshold, but moderate   hypoperfusion in the right MCA territory at the 4 second threshold

## 2024-02-06 NOTE — PROGRESS NOTE ADULT - SUBJECTIVE AND OBJECTIVE BOX
Date of Service: 02-06-24 @ 09:39    Patient is a 89y old  Male who presents with a chief complaint of COVID19, Chest pain (06 Feb 2024 07:10)      Any change in ROS: Still intubated:  on low dose vasopressors    MEDICATIONS  (STANDING):  albuterol/ipratropium for Nebulization 3 milliLiter(s) Nebulizer every 6 hours  artificial  tears Solution 1 Drop(s) Left EYE four times a day  aspirin  chewable 81 milliGRAM(s) Enteral Tube daily  chlorhexidine 0.12% Liquid 15 milliLiter(s) Oral Mucosa every 12 hours  chlorhexidine 2% Cloths 1 Application(s) Topical daily  dexMEDEtomidine Infusion 0.2 MICROgram(s)/kG/Hr (3.97 mL/Hr) IV Continuous <Continuous>  dextrose 5%. 1000 milliLiter(s) (50 mL/Hr) IV Continuous <Continuous>  dextrose 5%. 1000 milliLiter(s) (100 mL/Hr) IV Continuous <Continuous>  dextrose 50% Injectable 25 Gram(s) IV Push once  dextrose 50% Injectable 25 Gram(s) IV Push once  dextrose 50% Injectable 12.5 Gram(s) IV Push once  escitalopram 10 milliGRAM(s) Oral daily  glucagon  Injectable 1 milliGRAM(s) IntraMuscular once  heparin   Injectable 5000 Unit(s) SubCutaneous every 8 hours  insulin glargine Injectable (LANTUS) 15 Unit(s) SubCutaneous <User Schedule>  insulin lispro (ADMELOG) corrective regimen sliding scale   SubCutaneous every 6 hours  levETIRAcetam  IVPB 500 milliGRAM(s) IV Intermittent every 12 hours  lidocaine   4% Patch 1 Patch Transdermal every 24 hours  norepinephrine Infusion 0.05 MICROgram(s)/kG/Min (3.72 mL/Hr) IV Continuous <Continuous>  pantoprazole  Injectable 40 milliGRAM(s) IV Push every 12 hours  petrolatum Ophthalmic Ointment 1 Application(s) Left EYE at bedtime  sodium chloride 3%  Inhalation 4 milliLiter(s) Inhalation two times a day  sucralfate suspension 1 Gram(s) Enteral Tube two times a day    MEDICATIONS  (PRN):  dextrose Oral Gel 15 Gram(s) Oral once PRN Blood Glucose LESS THAN 70 milliGRAM(s)/deciliter    Vital Signs Last 24 Hrs  T(C): 37.3 (06 Feb 2024 08:00), Max: 37.8 (05 Feb 2024 12:00)  T(F): 99.1 (06 Feb 2024 08:00), Max: 100 (05 Feb 2024 12:00)  HR: 85 (06 Feb 2024 09:00) (85 - 94)  BP: 130/54 (06 Feb 2024 09:00) (90/36 - 146/57)  BP(mean): 73 (06 Feb 2024 09:00) (50 - 87)  RR: 14 (06 Feb 2024 09:00) (12 - 21)  SpO2: 100% (06 Feb 2024 09:00) (97% - 100%)    Parameters below as of 06 Feb 2024 08:00  Patient On (Oxygen Delivery Method): ventilator    O2 Concentration (%): 30  Mode: AC/ CMV (Assist Control/ Continuous Mandatory Ventilation)  RR (machine): 12  TV (machine): 450  FiO2: 30  PEEP: 5  ITime: 0.87  MAP: 9  PIP: 29    I&O's Summary    05 Feb 2024 07:01  -  06 Feb 2024 07:00  --------------------------------------------------------  IN: 2068.6 mL / OUT: 1405 mL / NET: 663.6 mL    06 Feb 2024 07:01  -  06 Feb 2024 09:39  --------------------------------------------------------  IN: 10.8 mL / OUT: 150 mL / NET: -139.2 mL          Physical Exam:   GENERAL: NAD, well-groomed, well-developed  HEENT: JAVAD/   Atraumatic, Normocephalic  ENMT: No tonsillar erythema, exudates, or enlargement; Moist mucous membranes, Good dentition, No lesions  NECK: Supple, No JVD, Normal thyroid  CHEST/LUNG: Clear to auscultaion  CVS: Regular rate and rhythm; No murmurs, rubs, or gallops  GI: : Soft, Nontender, Nondistended; Bowel sounds present  NERVOUS SYSTEM:  Lethargic but open eyes and responds to simple commands   EXTREMITIES:  - edema  LYMPH: No lymphadenopathy noted  SKIN: No rashes or lesions  ENDOCRINOLOGY: No Thyromegaly  PSYCH: mike m and intubated on Precedex     Labs:  ABG - ( 06 Feb 2024 00:40 )  pH, Arterial: 7.44  pH, Blood: x     /  pCO2: 48    /  pO2: 136   / HCO3: 33    / Base Excess: 7.6   /  SaO2: 99.0            34, 28                            7.2    7.33  )-----------( 308      ( 06 Feb 2024 00:40 )             22.6                         7.7    8.14  )-----------( 357      ( 05 Feb 2024 01:58 )             24.1                         7.4    7.13  )-----------( 369      ( 04 Feb 2024 02:46 )             24.0                         7.1    8.13  )-----------( 359      ( 03 Feb 2024 10:40 )             22.2                         7.1    8.99  )-----------( 377      ( 03 Feb 2024 04:24 )             22.5                         7.1    8.63  )-----------( 398      ( 02 Feb 2024 18:21 )             24.2     02-06    140  |  103  |  33<H>  ----------------------------<  108<H>  3.9   |  27  |  1.58<H>  02-05    139  |  99  |  34<H>  ----------------------------<  140<H>  3.9   |  30  |  1.73<H>  02-04    139  |  99  |  33<H>  ----------------------------<  241<H>  3.6   |  30  |  1.75<H>  02-04    144  |  103  |  32<H>  ----------------------------<  148<H>  3.7   |  31  |  1.87<H>  02-03    147<H>  |  107  |  35<H>  ----------------------------<  223<H>  3.7   |  30  |  1.84<H>  02-03    151<H>  |  110<H>  |  35<H>  ----------------------------<  94  3.2<L>   |  30  |  1.89<H>  02-02    146<H>  |  108<H>  |  37<H>  ----------------------------<  215<H>  4.0   |  25  |  1.86<H>  02-02    148<H>  |  108<H>  |  39<H>  ----------------------------<  327<H>  4.0   |  30  |  1.87<H>    Ca    7.7<L>      06 Feb 2024 00:40  Ca    7.9<L>      05 Feb 2024 01:58  Ca    7.8<L>      04 Feb 2024 12:57  Phos  3.3     02-06  Phos  3.5     02-05  Mg     1.90     02-06  Mg     1.90     02-05    TPro  5.9<L>  /  Alb  2.1<L>  /  TBili  0.2  /  DBili  x   /  AST  19  /  ALT  19  /  AlkPhos  93  02-06  TPro  6.2  /  Alb  2.2<L>  /  TBili  0.2  /  DBili  x   /  AST  24  /  ALT  29  /  AlkPhos  110  02-05  TPro  6.0  /  Alb  2.2<L>  /  TBili  0.2  /  DBili  x   /  AST  29  /  ALT  37  /  AlkPhos  110  02-04  TPro  5.8<L>  /  Alb  2.3<L>  /  TBili  0.3  /  DBili  x   /  AST  42<H>  /  ALT  46<H>  /  AlkPhos  97  02-03  TPro  6.1  /  Alb  2.3<L>  /  TBili  0.3  /  DBili  x   /  AST  46<H>  /  ALT  52<H>  /  AlkPhos  71  02-03  TPro  6.3  /  Alb  2.4<L>  /  TBili  0.2  /  DBili  x   /  AST  56<H>  /  ALT  61<H>  /  AlkPhos  67  02-02    CAPILLARY BLOOD GLUCOSE      POCT Blood Glucose.: 100 mg/dL (06 Feb 2024 05:23)  POCT Blood Glucose.: 160 mg/dL (05 Feb 2024 23:11)  POCT Blood Glucose.: 159 mg/dL (05 Feb 2024 17:12)  POCT Blood Glucose.: 214 mg/dL (05 Feb 2024 12:06)      LIVER FUNCTIONS - ( 06 Feb 2024 00:40 )  Alb: 2.1 g/dL / Pro: 5.9 g/dL / ALK PHOS: 93 U/L / ALT: 19 U/L / AST: 19 U/L / GGT: x           PT/INR - ( 06 Feb 2024 00:40 )   PT: 11.5 sec;   INR: 1.02 ratio         PTT - ( 06 Feb 2024 00:40 )  PTT:31.4 sec  Urinalysis Basic - ( 06 Feb 2024 00:40 )    Color: x / Appearance: x / SG: x / pH: x  Gluc: 108 mg/dL / Ketone: x  / Bili: x / Urobili: x   Blood: x / Protein: x / Nitrite: x   Leuk Esterase: x / RBC: x / WBC x   Sq Epi: x / Non Sq Epi: x / Bacteria: x        rad< from: Xray Chest 1 View- PORTABLE-Urgent (Xray Chest 1 View- PORTABLE-Urgent .) (02.02.24 @ 16:39) >    ACC: [id number] EXAM:  XR CHEST PORTABLE URGENT 1V   ORDERED BY: JASMEET PEREZ     PROCEDURE DATE:  02/02/2024          INTERPRETATION:  EXAMINATION: XR CHEST URGENT    CLINICAL INDICATION: sp intubation    TECHNIQUE: Single frontal, portable view of the chest was obtained.    COMPARISON: Chest x-ray 1/31/2024.    IMPRESSION:  Intubated with ETT tip at clavicular level. Enteric tube extends below   diaphragm into left hemiabdomen with tip extending beyond imaged field of   view. Partially visualized pigtail drainage tubes in right hemiabdomen   near inferior image margin.    Stable sternal wires left chest wall dual-lead pacemaker and prominent   appearing cardiac and mediastinal silhouettes.    Redemonstrated diffusely prominent hazy bilateral lung markings probably   reflecting moderate to severe congestion/edema however concomitant   superimposed infection in proper clinical context cannot be excluded.    Hazy lung bases and CP regions could be due to overlying soft tissues   and/or small pleural effusions. No pneumothorax.    --- End of Report ---          RADHA ROD MD; Resident Radiologist  This document has been electronically signed.  NORAH GREGORY MD; Attending Radiologist  This document has been electronically signed. Feb  3 2024 12:32PM    < end of copied text >      RECENT CULTURES:        RESPIRATORY CULTURES:          Studies  Chest X-RAY  CT SCAN Chest   Venous Dopplers: LE:   CT Abdomen  Others

## 2024-02-06 NOTE — PROGRESS NOTE ADULT - SUBJECTIVE AND OBJECTIVE BOX
Aashish Boyer MD  Interventional Cardiology / Advance Heart Failure and Cardiac Transplant Specialist  [place] Office : [phone] [place] [id number]  Tel:   [place] Office : [address] [facility] [address]  Tel: [phone]      Subjective/Overnight events: Pt is lying in bed in MICU  	  MEDICATIONS:  aspirin  chewable 81 milliGRAM(s) Enteral Tube daily  heparin   Injectable 5000 Unit(s) SubCutaneous every 8 hours  norepinephrine Infusion 0.05 MICROgram(s)/kG/Min IV Continuous <Continuous>      albuterol/ipratropium for Nebulization 3 milliLiter(s) Nebulizer every 6 hours  sodium chloride 3%  Inhalation 4 milliLiter(s) Inhalation two times a day    dexMEDEtomidine Infusion 0.2 MICROgram(s)/kG/Hr IV Continuous <Continuous>  escitalopram 10 milliGRAM(s) Oral daily  levETIRAcetam  IVPB 500 milliGRAM(s) IV Intermittent every 12 hours    pantoprazole  Injectable 40 milliGRAM(s) IV Push every 12 hours  sucralfate suspension 1 Gram(s) Enteral Tube two times a day    dextrose 50% Injectable 25 Gram(s) IV Push once  dextrose 50% Injectable 25 Gram(s) IV Push once  dextrose 50% Injectable 12.5 Gram(s) IV Push once  dextrose Oral Gel 15 Gram(s) Oral once PRN  glucagon  Injectable 1 milliGRAM(s) IntraMuscular once  insulin glargine Injectable (LANTUS) 12 Unit(s) SubCutaneous <User Schedule>  insulin lispro (ADMELOG) corrective regimen sliding scale   SubCutaneous every 6 hours    artificial  tears Solution 1 Drop(s) Left EYE four times a day  chlorhexidine 0.12% Liquid 15 milliLiter(s) Oral Mucosa every 12 hours  chlorhexidine 2% Cloths 1 Application(s) Topical daily  dextrose 5%. 1000 milliLiter(s) IV Continuous <Continuous>  dextrose 5%. 1000 milliLiter(s) IV Continuous <Continuous>  lidocaine   4% Patch 1 Patch Transdermal every 24 hours  petrolatum Ophthalmic Ointment 1 Application(s) Left EYE at bedtime      PAST MEDICAL/SURGICAL HISTORY  PAST MEDICAL & SURGICAL HISTORY:  Hyperlipemia      Hypertension      Coronary Artery Disease      Diabetes Mellitus Type II      Stented Coronary Artery  total 5 stents, last stent 5/2019      Neuropathy      Myocardial infarction      Stroke  mild left facial numbness   no other residuals verbalized      Myoclonic jerking      Stage 3 chronic kidney disease      History of Cataract Extraction      Hx of CABG      H/O coronary angiogram      S/P coronary artery stent placement  1/6/09      S/P placement of cardiac pacemaker          SOCIAL HISTORY: Substance Use (street drugs): ( x ) never used  (  ) other:    FAMILY HISTORY:  No pertinent family history in first degree relatives          PHYSICAL EXAM:  T(C): 37.1 (02-06-24 @ 12:00), Max: 37.4 (02-05-24 @ 16:00)  HR: 89 (02-06-24 @ 14:42) (83 - 93)  BP: 120/49 (02-06-24 @ 14:00) (105/42 - 139/56)  RR: 15 (02-06-24 @ 14:00) (12 - 21)  SpO2: 96% (02-06-24 @ 14:42) (96% - 100%)  Wt(kg): --  I&O's Summary    05 Feb 2024 07:01  -  06 Feb 2024 07:00  --------------------------------------------------------  IN: 2068.6 mL / OUT: 1405 mL / NET: 663.6 mL    06 Feb 2024 07:01  -  06 Feb 2024 15:30  --------------------------------------------------------  IN: 117.8 mL / OUT: 485 mL / NET: -367.2 mL          GENERAL: intubated  EYES:  conjunctiva and sclera clear  ENMT: No tonsillar erythema, exudates, or enlargement  Cardiovascular: Normal S1 S2, No JVD, No murmurs, No edema  Respiratory: Lungs clear to auscultation	  Gastrointestinal:  Soft  Extremities: No edema                                   7.2    7.33  )-----------( 308      ( 06 Feb 2024 00:40 )             22.6     02-06    140  |  103  |  33<H>  ----------------------------<  108<H>  3.9   |  27  |  1.58<H>    Ca    7.7<L>      06 Feb 2024 00:40  Phos  3.3     02-06  Mg     1.90     02-06    TPro  5.9<L>  /  Alb  2.1<L>  /  TBili  0.2  /  DBili  x   /  AST  19  /  ALT  19  /  AlkPhos  93  02-06    proBNP:   Lipid Profile:   HgA1c:   TSH:     Consultant(s) Notes Reviewed:  [x ] YES  [ ] NO    Care Discussed with Consultants/Other Providers [ x] YES  [ ] NO    Imaging Personally Reviewed independently:  [x] YES  [ ] NO    All labs, radiologic studies, vitals, orders and medications list reviewed. Patient is seen and examined at bedside. Case discussed with medical team.

## 2024-02-06 NOTE — PROGRESS NOTE ADULT - ASSESSMENT
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 During hospitalization, pt was taken off Brilinta for a planned pleural tap of lung effusions/GI had plans for PEG tube placement. Pt was intubated and since extubation, it has been noted that pt has been completely "aphasic"- nonverbal and not following commands for which neurology has been consulted. LKW 7:30 PM 1/30/24. Also, it has been noted that there is a R facial droop and pt has been having less movements on the LUE/LLE and pt has been looking towards the right side more than his left side, and has been having "myoclonic jerks" in his shoulders with some extended and inward "posturing in his arms". Prior to extubation, he was following commands. At baseline, prior to hospital admission pt was using a cane but independent of ADLs. Sedation stopped at 7:30 PM 1/31/24. EEG was started on 2/1/24 which showed intermittent generalized myoclonic seizures consisting of brief myoclonic jerks in addition to generalized ictal-interictal continuum. 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    IMPRESSION: Acute change in mental status w/ reported aphasia and decreased movements on Left side and transient R facial droop after extubation possibly 2/2 seizure-like activity vs. vascular etiology in the setting of known paroxsymal Afib. 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    RECOMMENDATIONS:   [] D/c vEEG  [x] s/p Valproic acid 1500mg x1 (2/1/24), valproic acid 1600mg x1 (completed at 2/2/24 at 12pm), valproic acid d/dangelo  [x] Continue levetiracetam 500mg IV Q12H  [x] d/c valproic acid  [] MRI brain w/ and w/o contrast only when medically stable  [x] taper off of propofol and hold off on using midazolam  [] if needed for clinical sedation, please use precedex  [x] Continue home ASA 300mg suppository QD  [] Start Atorvastatin per ASCVD risk; if stroke on MRI, then high-intensity dosage  [x] TTE study - EF 62% no regional wall abnl, mild mod severe AS mild AR  [x] Check HbA1c (9.3) and lipid panel (LDL 20)  [x] Telemonitoring  [] Rest of care per MICU team    Patient seen by team and attending. Note finalized upon attending attestation.

## 2024-02-06 NOTE — PROGRESS NOTE ADULT - ASSESSMENT
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 Patient states that he has been having a dry cough for the past week, worsened over the past few days. Denies SOB with the cough, denies hemoptysis. Reports fevers at home to 101.5 with associated diaphoresis. Said that he has significant pinprick like b/l chest pain with his cough but denies any chest pain when not coughing or with ambulation. Also reports rhinorrhea and sore throat. Reports generalized weakness and poor appetite. States his daughter was visiting him over the week end last week and she was positive for COVID19. Patient tested positive for COVID19 2 days prior and was started on paxlovid as outpatient. Of note, family states that the patient has had worsening confusion at home over the past 6 months (becoming disoriented to time) but recently has been more confused, talking about seeing people that are not present.   On arrival to the ED his vitals were T 98 -> 99.2, P 80, /72, RR 18, ) sat 100% RA. His lab work showed leukocytosis with neutrophilia and bandemia, MABLE, mild hyponatremia, indeterminant but stable troponins, and elevated lactate to 2.3. His COVID19 swab was positive. His CXR showed bibasilar crackles.  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   -on valproate : neurology following   Covid / Resp failure/on 2 L of oxygen:  -he was treated for covid before:   -he has excessive secretions  -keep o2 sao2 above 90%  -add BD and mucomyst: ;  -add mucinex:   1/10: he seems to be doing  ok: cont mucomyst and bd   1/11 ?: add benzonatate and Robitussin prm : dc dornase  1/12: seems OK: no sob:  no cough : no phlegm : has gurgling sound in throat:  looks comfortable  1/14: he is on room air:  with good sao2:  finished covid rx:  cont current rx:  high risk for aspiration as he has gurgling secretions in throat   1/15: would do ct chest he seems to have increasing effusions:  his ct scan from last week of december:  has not much of effusions: however will try lasix : [facility]    1/16: doing  ok : no os:b has secretions still : change to percussion bed:  cont bd  1/17: seems stable:  no sob: on 3 L of oxygen : should add ATC lasix to me as he has formed new pleural effusions:  echo with mod AS   1/18; dw pts son : who is a neurologist:  he has secretions in chest with atelectasis and yovani effusions with increased secretions:  the son requests bronchoscopy:  I have explained the advantages and disadvantages of the bronch at this age and he might not be able to be extubated soon after procedure:  but they want to go ahead with it: IP called   He will remain art risk for recurrent aspiration and atelectasis even after the procedure and they understand that    1/19: FOR BRONCH TODAY  : AGAIN CONFIRMED WITH NEUROLOGISTS SON  1/20 : events noted:  was successful extubated after the procedure:  but then he desaturated with increasing secretions and ended up on high flow and adm to MICU : on recent chest xray has mod large effusion on rigth side:  I think it needs a tap:  would defer to [facility] MICU team   ; [facility] cultures are still pending  1/21: dw fellow  consider tapping on rigth saide:  his PCP and son at bedside:  he is not obviously sob but still on 100%  high flow   1/22: he is doing poorly:  WBC increased:  bronch culturs with staph : nares with staph ? add vanco:  defer to MICU : in addition:  he has large effusion 0on rigth side: ? chest tube:  but brillinta needs to be stopped : [facility] attending  1/23: got intubated : sedated on vasopressors:  s/p brocnh : [facility] attending again  : possibly needs tap on rigth side   1/24: cont current rx:  defer to primary team  : s/p bronch   -cont current medications   1/28: seems same to me  : intubated and is on precedex:  cont current pulm care:  on antibiotics  1/29: on cpap trials today : for possible extubation;  son at bedside; cont antibiotics  1/30: seems to be doing ok : intubated and on precedex  : on cpap trials:  extubation per primary team  2/1: extubated:  on bipap:  monitor abg:  on meropenem  2/2: clinically looks the same:  not much improved mental status: ABG last night was pretty reasonable:  cont to monitor  2/3: intubated again for myotonic jerks:  cont current treatment:    2/5: intubated:  unable to be extubated:  now possible trach and peg per family   2/6: intubated and sedated  on Precedex  for eventually trach and peg  : john family agreement      DM  monitor and control   CKD  -cont current meds   HTN   -controlled  1/28:   -not on vasopressors:  antihypertensives being held  1/29: remains hemodynamically stable   1/30 : blood pressure  1/31: remained off pressors  2/2 hemodynamically stable: off pressors  2/3: he is on vasopressors now:   2/5: cont to be on vasopressors:   2/6; on low dose vasopressors:  wean as tolerated   CVA X 3  -doing ok :     dw family  and team  1/28: currently intubated   1/29: on cpap trials for possible extubation   1/30: intubated:  and precedex  1/31; off precedex but still lethargic : defer to neurology / MICU    2/2: neuro follo w up : has had cvx x 3 before  2/4: per neuro    GI Bleed:   -secondary to Dieulafoy's lesion:  defer to primary team :    dvt prophylaxis:    dw team

## 2024-02-07 LAB
ALBUMIN SERPL ELPH-MCNC: 2.2 G/DL — LOW (ref 3.3–5)
ALP SERPL-CCNC: 87 U/L — SIGNIFICANT CHANGE UP (ref 40–120)
ALT FLD-CCNC: 21 U/L — SIGNIFICANT CHANGE UP (ref 4–41)
ANION GAP SERPL CALC-SCNC: 8 MMOL/L — SIGNIFICANT CHANGE UP (ref 7–14)
APTT BLD: 33.3 SEC — SIGNIFICANT CHANGE UP (ref 24.5–35.6)
AST SERPL-CCNC: 22 U/L — SIGNIFICANT CHANGE UP (ref 4–40)
BASE EXCESS BLDV CALC-SCNC: 6.5 MMOL/L — HIGH (ref -2–3)
BILIRUB SERPL-MCNC: 0.2 MG/DL — SIGNIFICANT CHANGE UP (ref 0.2–1.2)
BLOOD GAS VENOUS COMPREHENSIVE RESULT: SIGNIFICANT CHANGE UP
BUN SERPL-MCNC: 31 MG/DL — HIGH (ref 7–23)
CALCIUM SERPL-MCNC: 7.9 MG/DL — LOW (ref 8.4–10.5)
CHLORIDE BLDV-SCNC: 104 MMOL/L — SIGNIFICANT CHANGE UP (ref 96–108)
CHLORIDE SERPL-SCNC: 102 MMOL/L — SIGNIFICANT CHANGE UP (ref 98–107)
CO2 BLDV-SCNC: 34.2 MMOL/L — HIGH (ref 22–26)
CO2 SERPL-SCNC: 29 MMOL/L — SIGNIFICANT CHANGE UP (ref 22–31)
CREAT SERPL-MCNC: 1.57 MG/DL — HIGH (ref 0.5–1.3)
EGFR: 42 ML/MIN/1.73M2 — LOW
GAS PNL BLDV: 136 MMOL/L — SIGNIFICANT CHANGE UP (ref 136–145)
GAS PNL BLDV: SIGNIFICANT CHANGE UP
GLUCOSE BLDC GLUCOMTR-MCNC: 178 MG/DL — HIGH (ref 70–99)
GLUCOSE BLDC GLUCOMTR-MCNC: 216 MG/DL — HIGH (ref 70–99)
GLUCOSE BLDC GLUCOMTR-MCNC: 217 MG/DL — HIGH (ref 70–99)
GLUCOSE BLDC GLUCOMTR-MCNC: 239 MG/DL — HIGH (ref 70–99)
GLUCOSE BLDV-MCNC: 191 MG/DL — HIGH (ref 70–99)
GLUCOSE SERPL-MCNC: 181 MG/DL — HIGH (ref 70–99)
HCO3 BLDV-SCNC: 32 MMOL/L — HIGH (ref 22–29)
HCT VFR BLD CALC: 23.7 % — LOW (ref 39–50)
HCT VFR BLDA CALC: 23 % — LOW (ref 39–51)
HGB BLD CALC-MCNC: 7.8 G/DL — LOW (ref 12.6–17.4)
HGB BLD-MCNC: 7.3 G/DL — LOW (ref 13–17)
INR BLD: 1.02 RATIO — SIGNIFICANT CHANGE UP (ref 0.85–1.18)
LACTATE BLDV-MCNC: 1 MMOL/L — SIGNIFICANT CHANGE UP (ref 0.5–2)
MAGNESIUM SERPL-MCNC: 1.9 MG/DL — SIGNIFICANT CHANGE UP (ref 1.6–2.6)
MCHC RBC-ENTMCNC: 29.3 PG — SIGNIFICANT CHANGE UP (ref 27–34)
MCHC RBC-ENTMCNC: 30.8 GM/DL — LOW (ref 32–36)
MCV RBC AUTO: 95.2 FL — SIGNIFICANT CHANGE UP (ref 80–100)
NRBC # BLD: 0 /100 WBCS — SIGNIFICANT CHANGE UP (ref 0–0)
NRBC # FLD: 0 K/UL — SIGNIFICANT CHANGE UP (ref 0–0)
PCO2 BLDV: 55 MMHG — SIGNIFICANT CHANGE UP (ref 42–55)
PH BLDV: 7.38 — SIGNIFICANT CHANGE UP (ref 7.32–7.43)
PHOSPHATE SERPL-MCNC: 3.8 MG/DL — SIGNIFICANT CHANGE UP (ref 2.5–4.5)
PLATELET # BLD AUTO: 290 K/UL — SIGNIFICANT CHANGE UP (ref 150–400)
PO2 BLDV: 44 MMHG — SIGNIFICANT CHANGE UP (ref 25–45)
POTASSIUM BLDV-SCNC: 4.1 MMOL/L — SIGNIFICANT CHANGE UP (ref 3.5–5.1)
POTASSIUM SERPL-MCNC: 4.1 MMOL/L — SIGNIFICANT CHANGE UP (ref 3.5–5.3)
POTASSIUM SERPL-SCNC: 4.1 MMOL/L — SIGNIFICANT CHANGE UP (ref 3.5–5.3)
PROT SERPL-MCNC: 6.2 G/DL — SIGNIFICANT CHANGE UP (ref 6–8.3)
PROTHROM AB SERPL-ACNC: 11.4 SEC — SIGNIFICANT CHANGE UP (ref 9.5–13)
RBC # BLD: 2.49 M/UL — LOW (ref 4.2–5.8)
RBC # FLD: 17.4 % — HIGH (ref 10.3–14.5)
SAO2 % BLDV: 68.2 % — SIGNIFICANT CHANGE UP (ref 67–88)
SODIUM SERPL-SCNC: 139 MMOL/L — SIGNIFICANT CHANGE UP (ref 135–145)
WBC # BLD: 6.96 K/UL — SIGNIFICANT CHANGE UP (ref 3.8–10.5)
WBC # FLD AUTO: 6.96 K/UL — SIGNIFICANT CHANGE UP (ref 3.8–10.5)

## 2024-02-07 PROCEDURE — 99291 CRITICAL CARE FIRST HOUR: CPT

## 2024-02-07 PROCEDURE — 93308 TTE F-UP OR LMTD: CPT | Mod: 26,GC

## 2024-02-07 PROCEDURE — 76604 US EXAM CHEST: CPT | Mod: 26,GC

## 2024-02-07 RX ORDER — FUROSEMIDE 40 MG
40 TABLET ORAL ONCE
Refills: 0 | Status: COMPLETED | OUTPATIENT
Start: 2024-02-07 | End: 2024-02-07

## 2024-02-07 RX ORDER — PROPOFOL 10 MG/ML
10 INJECTION, EMULSION INTRAVENOUS
Qty: 1000 | Refills: 0 | Status: DISCONTINUED | OUTPATIENT
Start: 2024-02-07 | End: 2024-02-07

## 2024-02-07 RX ADMIN — LIDOCAINE 1 PATCH: 4 CREAM TOPICAL at 19:49

## 2024-02-07 RX ADMIN — SODIUM CHLORIDE 4 MILLILITER(S): 9 INJECTION INTRAMUSCULAR; INTRAVENOUS; SUBCUTANEOUS at 20:07

## 2024-02-07 RX ADMIN — Medication 1 DROP(S): at 00:02

## 2024-02-07 RX ADMIN — Medication 40 MILLIGRAM(S): at 09:45

## 2024-02-07 RX ADMIN — SODIUM CHLORIDE 4 MILLILITER(S): 9 INJECTION INTRAMUSCULAR; INTRAVENOUS; SUBCUTANEOUS at 09:11

## 2024-02-07 RX ADMIN — Medication 1 DROP(S): at 23:07

## 2024-02-07 RX ADMIN — Medication 1 DROP(S): at 11:35

## 2024-02-07 RX ADMIN — Medication 3 MILLILITER(S): at 09:11

## 2024-02-07 RX ADMIN — LIDOCAINE 1 PATCH: 4 CREAM TOPICAL at 10:49

## 2024-02-07 RX ADMIN — Medication 4: at 11:34

## 2024-02-07 RX ADMIN — ESCITALOPRAM OXALATE 10 MILLIGRAM(S): 10 TABLET, FILM COATED ORAL at 11:35

## 2024-02-07 RX ADMIN — LEVETIRACETAM 400 MILLIGRAM(S): 250 TABLET, FILM COATED ORAL at 18:14

## 2024-02-07 RX ADMIN — INSULIN GLARGINE 12 UNIT(S): 100 INJECTION, SOLUTION SUBCUTANEOUS at 11:35

## 2024-02-07 RX ADMIN — Medication 3 MILLILITER(S): at 16:51

## 2024-02-07 RX ADMIN — Medication 2: at 00:02

## 2024-02-07 RX ADMIN — Medication 3.72 MICROGRAM(S)/KG/MIN: at 19:32

## 2024-02-07 RX ADMIN — Medication 3 MILLILITER(S): at 03:19

## 2024-02-07 RX ADMIN — CHLORHEXIDINE GLUCONATE 15 MILLILITER(S): 213 SOLUTION TOPICAL at 05:09

## 2024-02-07 RX ADMIN — Medication 4: at 05:10

## 2024-02-07 RX ADMIN — Medication 1 DROP(S): at 05:19

## 2024-02-07 RX ADMIN — DEXMEDETOMIDINE HYDROCHLORIDE IN 0.9% SODIUM CHLORIDE 3.97 MICROGRAM(S)/KG/HR: 4 INJECTION INTRAVENOUS at 19:32

## 2024-02-07 RX ADMIN — Medication 3.72 MICROGRAM(S)/KG/MIN: at 07:58

## 2024-02-07 RX ADMIN — CHLORHEXIDINE GLUCONATE 1 APPLICATION(S): 213 SOLUTION TOPICAL at 11:36

## 2024-02-07 RX ADMIN — Medication 2: at 18:15

## 2024-02-07 RX ADMIN — HEPARIN SODIUM 5000 UNIT(S): 5000 INJECTION INTRAVENOUS; SUBCUTANEOUS at 05:10

## 2024-02-07 RX ADMIN — Medication 1 GRAM(S): at 05:10

## 2024-02-07 RX ADMIN — CHLORHEXIDINE GLUCONATE 15 MILLILITER(S): 213 SOLUTION TOPICAL at 18:15

## 2024-02-07 RX ADMIN — LIDOCAINE 1 PATCH: 4 CREAM TOPICAL at 07:45

## 2024-02-07 RX ADMIN — HEPARIN SODIUM 5000 UNIT(S): 5000 INJECTION INTRAVENOUS; SUBCUTANEOUS at 21:14

## 2024-02-07 RX ADMIN — Medication 81 MILLIGRAM(S): at 11:36

## 2024-02-07 RX ADMIN — Medication 1 GRAM(S): at 18:15

## 2024-02-07 RX ADMIN — Medication 3 MILLILITER(S): at 20:06

## 2024-02-07 RX ADMIN — PANTOPRAZOLE SODIUM 40 MILLIGRAM(S): 20 TABLET, DELAYED RELEASE ORAL at 05:09

## 2024-02-07 RX ADMIN — DEXMEDETOMIDINE HYDROCHLORIDE IN 0.9% SODIUM CHLORIDE 3.97 MICROGRAM(S)/KG/HR: 4 INJECTION INTRAVENOUS at 07:57

## 2024-02-07 RX ADMIN — PANTOPRAZOLE SODIUM 40 MILLIGRAM(S): 20 TABLET, DELAYED RELEASE ORAL at 18:15

## 2024-02-07 RX ADMIN — Medication 1 DROP(S): at 18:15

## 2024-02-07 RX ADMIN — Medication 4: at 23:06

## 2024-02-07 RX ADMIN — Medication 1 APPLICATION(S): at 21:29

## 2024-02-07 RX ADMIN — LEVETIRACETAM 400 MILLIGRAM(S): 250 TABLET, FILM COATED ORAL at 05:10

## 2024-02-07 RX ADMIN — HEPARIN SODIUM 5000 UNIT(S): 5000 INJECTION INTRAVENOUS; SUBCUTANEOUS at 14:49

## 2024-02-07 NOTE — PROGRESS NOTE ADULT - SUBJECTIVE AND OBJECTIVE BOX
***************************************************************  Candelario Brown) [facility] PGY2  Internal Medicine   ***************************************************************    SHAIK CHARANJIT  89y  MRN: [id number]    Patient is a 89y old  Male who presents with a chief complaint of COVID19, Chest pain (06 Feb 2024 15:29)      Subjective: no events ON. Denies fever, CP, SOB, abn pain, N/V, dysuria. Tolerating diet.      MEDICATIONS  (STANDING):  albuterol/ipratropium for Nebulization 3 milliLiter(s) Nebulizer every 6 hours  artificial  tears Solution 1 Drop(s) Left EYE four times a day  aspirin  chewable 81 milliGRAM(s) Enteral Tube daily  chlorhexidine 0.12% Liquid 15 milliLiter(s) Oral Mucosa every 12 hours  chlorhexidine 2% Cloths 1 Application(s) Topical daily  dexMEDEtomidine Infusion 0.2 MICROgram(s)/kG/Hr (3.97 mL/Hr) IV Continuous <Continuous>  dextrose 5%. 1000 milliLiter(s) (50 mL/Hr) IV Continuous <Continuous>  dextrose 5%. 1000 milliLiter(s) (100 mL/Hr) IV Continuous <Continuous>  dextrose 50% Injectable 25 Gram(s) IV Push once  dextrose 50% Injectable 25 Gram(s) IV Push once  dextrose 50% Injectable 12.5 Gram(s) IV Push once  escitalopram 10 milliGRAM(s) Oral daily  glucagon  Injectable 1 milliGRAM(s) IntraMuscular once  heparin   Injectable 5000 Unit(s) SubCutaneous every 8 hours  insulin glargine Injectable (LANTUS) 12 Unit(s) SubCutaneous <User Schedule>  insulin lispro (ADMELOG) corrective regimen sliding scale   SubCutaneous every 6 hours  levETIRAcetam  IVPB 500 milliGRAM(s) IV Intermittent every 12 hours  lidocaine   4% Patch 1 Patch Transdermal every 24 hours  norepinephrine Infusion 0.05 MICROgram(s)/kG/Min (3.72 mL/Hr) IV Continuous <Continuous>  pantoprazole  Injectable 40 milliGRAM(s) IV Push every 12 hours  petrolatum Ophthalmic Ointment 1 Application(s) Left EYE at bedtime  sodium chloride 3%  Inhalation 4 milliLiter(s) Inhalation two times a day  sucralfate suspension 1 Gram(s) Enteral Tube two times a day    MEDICATIONS  (PRN):  dextrose Oral Gel 15 Gram(s) Oral once PRN Blood Glucose LESS THAN 70 milliGRAM(s)/deciliter      Objective:    Vitals: Vital Signs Last 24 Hrs  T(C): 37.3 (02-07-24 @ 04:00), Max: 37.3 (02-06-24 @ 08:00)  T(F): 99.2 (02-07-24 @ 04:00), Max: 99.2 (02-07-24 @ 04:00)  HR: 93 (02-07-24 @ 07:00) (83 - 121)  BP: 122/51 (02-07-24 @ 07:00) (112/49 - 139/56)  BP(mean): 67 (02-07-24 @ 07:00) (62 - 84)  RR: 21 (02-07-24 @ 07:00) (13 - 21)  SpO2: 100% (02-07-24 @ 07:00) (96% - 100%)            I&O's Summary    06 Feb 2024 07:01  -  07 Feb 2024 07:00  --------------------------------------------------------  IN: 1895.4 mL / OUT: 1315 mL / NET: 580.4 mL        PHYSICAL EXAM:  GENERAL: NAD  HEAD:  Atraumatic, Normocephalic  EYES: EOMI, conjunctiva and sclera clear  CHEST/LUNG: Clear to auscultation bilaterally; No rales, rhonchi, wheezing, or rubs  HEART: Regular rate and rhythm; No murmurs, rubs, or gallops  ABDOMEN: Soft, Nontender, Nondistended;   SKIN: No rashes or lesions  NERVOUS SYSTEM:  Alert & Oriented X3, no focal deficits    LABS:  02-07    139  |  102  |  31<H>  ----------------------------<  181<H>  4.1   |  29  |  1.57<H>  02-06    140  |  103  |  33<H>  ----------------------------<  108<H>  3.9   |  27  |  1.58<H>  02-05    139  |  99  |  34<H>  ----------------------------<  140<H>  3.9   |  30  |  1.73<H>    Ca    7.9<L>      07 Feb 2024 00:10  Ca    7.7<L>      06 Feb 2024 00:40  Ca    7.9<L>      05 Feb 2024 01:58  Phos  3.8     02-07  Mg     1.90     02-07    TPro  6.2  /  Alb  2.2<L>  /  TBili  0.2  /  DBili  x   /  AST  22  /  ALT  21  /  AlkPhos  87  02-07  TPro  5.9<L>  /  Alb  2.1<L>  /  TBili  0.2  /  DBili  x   /  AST  19  /  ALT  19  /  AlkPhos  93  02-06  TPro  6.2  /  Alb  2.2<L>  /  TBili  0.2  /  DBili  x   /  AST  24  /  ALT  29  /  AlkPhos  110  02-05      PT/INR - ( 07 Feb 2024 00:10 )   PT: 11.4 sec;   INR: 1.02 ratio         PTT - ( 07 Feb 2024 00:10 )  PTT:33.3 sec              Urinalysis Basic - ( 07 Feb 2024 00:10 )    Color: x / Appearance: x / SG: x / pH: x  Gluc: 181 mg/dL / Ketone: x  / Bili: x / Urobili: x   Blood: x / Protein: x / Nitrite: x   Leuk Esterase: x / RBC: x / WBC x   Sq Epi: x / Non Sq Epi: x / Bacteria: x      ABG - ( 06 Feb 2024 00:40 )  pH, Arterial: 7.44  pH, Blood: x     /  pCO2: 48    /  pO2: 136   / HCO3: 33    / Base Excess: 7.6   /  SaO2: 99.0                                    7.3    6.96  )-----------( 290      ( 07 Feb 2024 00:10 )             23.7                         7.2    7.33  )-----------( 308      ( 06 Feb 2024 00:40 )             22.6                         7.7    8.14  )-----------( 357      ( 05 Feb 2024 01:58 )             24.1     CAPILLARY BLOOD GLUCOSE      POCT Blood Glucose.: 239 mg/dL (07 Feb 2024 05:02)  POCT Blood Glucose.: 185 mg/dL (06 Feb 2024 23:51)  POCT Blood Glucose.: 204 mg/dL (06 Feb 2024 17:28)  POCT Blood Glucose.: 145 mg/dL (06 Feb 2024 11:47)      RADIOLOGY & ADDITIONAL TESTS:    Imaging Personally Reviewed:  [x ] YES  [ ] NO    Consultants involved in case:   Consultant(s) Notes Reviewed:  [ x] YES  [ ] NO:   Care Discussed with Consultants/Other Providers [x ] YES  [ ] NO         ***************************************************************  Candelario Brown) [facility] PGY2  Internal Medicine   ***************************************************************    SHAIK CHARANJIT  89y  MRN: [id number]    Patient is a 89y old  Male who presents with a chief complaint of COVID19, Chest pain (06 Feb 2024 15:29)      Subjective: NAEO. Opens eyes, arousable to voice/sternal rub.     MEDICATIONS  (STANDING):  albuterol/ipratropium for Nebulization 3 milliLiter(s) Nebulizer every 6 hours  artificial  tears Solution 1 Drop(s) Left EYE four times a day  aspirin  chewable 81 milliGRAM(s) Enteral Tube daily  chlorhexidine 0.12% Liquid 15 milliLiter(s) Oral Mucosa every 12 hours  chlorhexidine 2% Cloths 1 Application(s) Topical daily  dexMEDEtomidine Infusion 0.2 MICROgram(s)/kG/Hr (3.97 mL/Hr) IV Continuous <Continuous>  dextrose 5%. 1000 milliLiter(s) (50 mL/Hr) IV Continuous <Continuous>  dextrose 5%. 1000 milliLiter(s) (100 mL/Hr) IV Continuous <Continuous>  dextrose 50% Injectable 25 Gram(s) IV Push once  dextrose 50% Injectable 25 Gram(s) IV Push once  dextrose 50% Injectable 12.5 Gram(s) IV Push once  escitalopram 10 milliGRAM(s) Oral daily  glucagon  Injectable 1 milliGRAM(s) IntraMuscular once  heparin   Injectable 5000 Unit(s) SubCutaneous every 8 hours  insulin glargine Injectable (LANTUS) 12 Unit(s) SubCutaneous <User Schedule>  insulin lispro (ADMELOG) corrective regimen sliding scale   SubCutaneous every 6 hours  levETIRAcetam  IVPB 500 milliGRAM(s) IV Intermittent every 12 hours  lidocaine   4% Patch 1 Patch Transdermal every 24 hours  norepinephrine Infusion 0.05 MICROgram(s)/kG/Min (3.72 mL/Hr) IV Continuous <Continuous>  pantoprazole  Injectable 40 milliGRAM(s) IV Push every 12 hours  petrolatum Ophthalmic Ointment 1 Application(s) Left EYE at bedtime  sodium chloride 3%  Inhalation 4 milliLiter(s) Inhalation two times a day  sucralfate suspension 1 Gram(s) Enteral Tube two times a day    MEDICATIONS  (PRN):  dextrose Oral Gel 15 Gram(s) Oral once PRN Blood Glucose LESS THAN 70 milliGRAM(s)/deciliter      Objective:    Vitals: Vital Signs Last 24 Hrs  T(C): 37.3 (02-07-24 @ 04:00), Max: 37.3 (02-06-24 @ 08:00)  T(F): 99.2 (02-07-24 @ 04:00), Max: 99.2 (02-07-24 @ 04:00)  HR: 93 (02-07-24 @ 07:00) (83 - 121)  BP: 122/51 (02-07-24 @ 07:00) (112/49 - 139/56)  BP(mean): 67 (02-07-24 @ 07:00) (62 - 84)  RR: 21 (02-07-24 @ 07:00) (13 - 21)  SpO2: 100% (02-07-24 @ 07:00) (96% - 100%)            I&O's Summary    06 Feb 2024 07:01  -  07 Feb 2024 07:00  --------------------------------------------------------  IN: 1895.4 mL / OUT: 1315 mL / NET: 580.4 mL        PHYSICAL EXAM:  GENERAL: NAD  HEAD:  Atraumatic, Normocephalic  EYES: EOMI, conjunctiva and sclera clear  CHEST/LUNG: Clear to auscultation bilaterally; No rales, rhonchi, wheezing, or rubs  HEART: Regular rate and rhythm; No murmurs, rubs, or gallops  ABDOMEN: Soft, Nontender, Nondistended;   SKIN: No rashes or lesions  NERVOUS SYSTEM:  Alert & Oriented X3, no focal deficits    LABS:  02-07    139  |  102  |  31<H>  ----------------------------<  181<H>  4.1   |  29  |  1.57<H>  02-06    140  |  103  |  33<H>  ----------------------------<  108<H>  3.9   |  27  |  1.58<H>  02-05    139  |  99  |  34<H>  ----------------------------<  140<H>  3.9   |  30  |  1.73<H>    Ca    7.9<L>      07 Feb 2024 00:10  Ca    7.7<L>      06 Feb 2024 00:40  Ca    7.9<L>      05 Feb 2024 01:58  Phos  3.8     02-07  Mg     1.90     02-07    TPro  6.2  /  Alb  2.2<L>  /  TBili  0.2  /  DBili  x   /  AST  22  /  ALT  21  /  AlkPhos  87  02-07  TPro  5.9<L>  /  Alb  2.1<L>  /  TBili  0.2  /  DBili  x   /  AST  19  /  ALT  19  /  AlkPhos  93  02-06  TPro  6.2  /  Alb  2.2<L>  /  TBili  0.2  /  DBili  x   /  AST  24  /  ALT  29  /  AlkPhos  110  02-05      PT/INR - ( 07 Feb 2024 00:10 )   PT: 11.4 sec;   INR: 1.02 ratio         PTT - ( 07 Feb 2024 00:10 )  PTT:33.3 sec              Urinalysis Basic - ( 07 Feb 2024 00:10 )    Color: x / Appearance: x / SG: x / pH: x  Gluc: 181 mg/dL / Ketone: x  / Bili: x / Urobili: x   Blood: x / Protein: x / Nitrite: x   Leuk Esterase: x / RBC: x / WBC x   Sq Epi: x / Non Sq Epi: x / Bacteria: x      ABG - ( 06 Feb 2024 00:40 )  pH, Arterial: 7.44  pH, Blood: x     /  pCO2: 48    /  pO2: 136   / HCO3: 33    / Base Excess: 7.6   /  SaO2: 99.0                                    7.3    6.96  )-----------( 290      ( 07 Feb 2024 00:10 )             23.7                         7.2    7.33  )-----------( 308      ( 06 Feb 2024 00:40 )             22.6                         7.7    8.14  )-----------( 357      ( 05 Feb 2024 01:58 )             24.1     CAPILLARY BLOOD GLUCOSE      POCT Blood Glucose.: 239 mg/dL (07 Feb 2024 05:02)  POCT Blood Glucose.: 185 mg/dL (06 Feb 2024 23:51)  POCT Blood Glucose.: 204 mg/dL (06 Feb 2024 17:28)  POCT Blood Glucose.: 145 mg/dL (06 Feb 2024 11:47)      RADIOLOGY & ADDITIONAL TESTS:    Imaging Personally Reviewed:  [x ] YES  [ ] NO    Consultants involved in case:   Consultant(s) Notes Reviewed:  [ x] YES  [ ] NO:   Care Discussed with Consultants/Other Providers [x ] YES  [ ] NO         ***************************************************************  Candelario Brown) [facility] PGY2  Internal Medicine   ***************************************************************    SHAIK CHARANJIT  89y  MRN: [id number]    Patient is a 89y old  Male who presents with a chief complaint of COVID19, Chest pain (06 Feb 2024 15:29)      Subjective: NAEO. Opens eyes, arousable to voice/sternal rub.     MEDICATIONS  (STANDING):  albuterol/ipratropium for Nebulization 3 milliLiter(s) Nebulizer every 6 hours  artificial  tears Solution 1 Drop(s) Left EYE four times a day  aspirin  chewable 81 milliGRAM(s) Enteral Tube daily  chlorhexidine 0.12% Liquid 15 milliLiter(s) Oral Mucosa every 12 hours  chlorhexidine 2% Cloths 1 Application(s) Topical daily  dexMEDEtomidine Infusion 0.2 MICROgram(s)/kG/Hr (3.97 mL/Hr) IV Continuous <Continuous>  dextrose 5%. 1000 milliLiter(s) (50 mL/Hr) IV Continuous <Continuous>  dextrose 5%. 1000 milliLiter(s) (100 mL/Hr) IV Continuous <Continuous>  dextrose 50% Injectable 25 Gram(s) IV Push once  dextrose 50% Injectable 25 Gram(s) IV Push once  dextrose 50% Injectable 12.5 Gram(s) IV Push once  escitalopram 10 milliGRAM(s) Oral daily  glucagon  Injectable 1 milliGRAM(s) IntraMuscular once  heparin   Injectable 5000 Unit(s) SubCutaneous every 8 hours  insulin glargine Injectable (LANTUS) 12 Unit(s) SubCutaneous <User Schedule>  insulin lispro (ADMELOG) corrective regimen sliding scale   SubCutaneous every 6 hours  levETIRAcetam  IVPB 500 milliGRAM(s) IV Intermittent every 12 hours  lidocaine   4% Patch 1 Patch Transdermal every 24 hours  norepinephrine Infusion 0.05 MICROgram(s)/kG/Min (3.72 mL/Hr) IV Continuous <Continuous>  pantoprazole  Injectable 40 milliGRAM(s) IV Push every 12 hours  petrolatum Ophthalmic Ointment 1 Application(s) Left EYE at bedtime  sodium chloride 3%  Inhalation 4 milliLiter(s) Inhalation two times a day  sucralfate suspension 1 Gram(s) Enteral Tube two times a day    MEDICATIONS  (PRN):  dextrose Oral Gel 15 Gram(s) Oral once PRN Blood Glucose LESS THAN 70 milliGRAM(s)/deciliter      Objective:    Vitals: Vital Signs Last 24 Hrs  T(C): 37.3 (02-07-24 @ 04:00), Max: 37.3 (02-06-24 @ 08:00)  T(F): 99.2 (02-07-24 @ 04:00), Max: 99.2 (02-07-24 @ 04:00)  HR: 93 (02-07-24 @ 07:00) (83 - 121)  BP: 122/51 (02-07-24 @ 07:00) (112/49 - 139/56)  BP(mean): 67 (02-07-24 @ 07:00) (62 - 84)  RR: 21 (02-07-24 @ 07:00) (13 - 21)  SpO2: 100% (02-07-24 @ 07:00) (96% - 100%)            I&O's Summary    06 Feb 2024 07:01  -  07 Feb 2024 07:00  --------------------------------------------------------  IN: 1895.4 mL / OUT: 1315 mL / NET: 580.4 mL        PHYSICAL EXAM:  GENERAL: NAD  HEAD:  Atraumatic, Normocephalic  EYES: EOMI, conjunctiva and sclera clear  CHEST/LUNG: Clear to auscultation bilaterally; No rales, rhonchi, wheezing, or rubs  HEART: Regular rate and rhythm; No murmurs, rubs, or gallops  ABDOMEN: Soft, Nontender, Nondistended;   SKIN: No rashes or lesions  NERVOUS SYSTEM:  Alert & Oriented X0, opens eyes to voice, arousable to voice    LABS:  02-07    139  |  102  |  31<H>  ----------------------------<  181<H>  4.1   |  29  |  1.57<H>  02-06    140  |  103  |  33<H>  ----------------------------<  108<H>  3.9   |  27  |  1.58<H>  02-05    139  |  99  |  34<H>  ----------------------------<  140<H>  3.9   |  30  |  1.73<H>    Ca    7.9<L>      07 Feb 2024 00:10  Ca    7.7<L>      06 Feb 2024 00:40  Ca    7.9<L>      05 Feb 2024 01:58  Phos  3.8     02-07  Mg     1.90     02-07    TPro  6.2  /  Alb  2.2<L>  /  TBili  0.2  /  DBili  x   /  AST  22  /  ALT  21  /  AlkPhos  87  02-07  TPro  5.9<L>  /  Alb  2.1<L>  /  TBili  0.2  /  DBili  x   /  AST  19  /  ALT  19  /  AlkPhos  93  02-06  TPro  6.2  /  Alb  2.2<L>  /  TBili  0.2  /  DBili  x   /  AST  24  /  ALT  29  /  AlkPhos  110  02-05      PT/INR - ( 07 Feb 2024 00:10 )   PT: 11.4 sec;   INR: 1.02 ratio         PTT - ( 07 Feb 2024 00:10 )  PTT:33.3 sec              Urinalysis Basic - ( 07 Feb 2024 00:10 )    Color: x / Appearance: x / SG: x / pH: x  Gluc: 181 mg/dL / Ketone: x  / Bili: x / Urobili: x   Blood: x / Protein: x / Nitrite: x   Leuk Esterase: x / RBC: x / WBC x   Sq Epi: x / Non Sq Epi: x / Bacteria: x      ABG - ( 06 Feb 2024 00:40 )  pH, Arterial: 7.44  pH, Blood: x     /  pCO2: 48    /  pO2: 136   / HCO3: 33    / Base Excess: 7.6   /  SaO2: 99.0                                    7.3    6.96  )-----------( 290      ( 07 Feb 2024 00:10 )             23.7                         7.2    7.33  )-----------( 308      ( 06 Feb 2024 00:40 )             22.6                         7.7    8.14  )-----------( 357      ( 05 Feb 2024 01:58 )             24.1     CAPILLARY BLOOD GLUCOSE      POCT Blood Glucose.: 239 mg/dL (07 Feb 2024 05:02)  POCT Blood Glucose.: 185 mg/dL (06 Feb 2024 23:51)  POCT Blood Glucose.: 204 mg/dL (06 Feb 2024 17:28)  POCT Blood Glucose.: 145 mg/dL (06 Feb 2024 11:47)      RADIOLOGY & ADDITIONAL TESTS:    Imaging Personally Reviewed:  [x ] YES  [ ] NO    Consultants involved in case:   Consultant(s) Notes Reviewed:  [ x] YES  [ ] NO:   Care Discussed with Consultants/Other Providers [x ] YES  [ ] NO

## 2024-02-07 NOTE — PROGRESS NOTE ADULT - ASSESSMENT
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     EKG SR RBBB (old per family)     1) Hypoxia   - s/p bronch   - intubated   - Rt pleural effusion   - hold lasix given Hypernatremia and worsening creat  - intubated again , AMS , on EEG   - f/u neuro recs   - for trach and peg     2) CAD s/p CABG  - p/w chest pain. EKG non ischemic , trop -esra   - echo with normal lv and moderate AS   - c/w metoprolol   - chest pains  Trop mildly elevated and trending down likely sec to kidney disease,   - holding brilinta, was on it for SMA stent placed in 12/2023, held 2/2 anticipation for PEG placement, restart when no further invasive procedures planned    3) Covid +  - s/p remdesivir   - c/w supportive management   - t/t per primary team     4) Abd distension   -CTA AP obtained which showed  partially occlusive calcified and non-calcified plaque just distal to take-off of the SMA, with estimated at least 75% luminal narrowing. Limited evaluation of its distal branches. 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   -on Protonix IV q 12

## 2024-02-07 NOTE — PROGRESS NOTE ADULT - ASSESSMENT
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     PLAN  =====Neurologic=====  #CVA  - prior CVA x3 with no residual deficits  #myoclonic jerks  - on Keppra 500 mg BID, per neuro wishing to wean however per family request will continue at 500 mg BID, but now off valproate per neuro    - also per neuro, use precedex as needed for clinical sedation as opposed to propofol   - holding home  gabapentin and memantine in setting of somnolence  - continue lexapro   - CT/CTA negative for stroke  - EEG now off   - MRI brain when stable and off EEG - discussed w/ EP, PPM compatible. Safety screening form done.       =====Pulmonary=====  #COVID  - s/p paxlovid and 1 dose remdesivir while inpatient  #Pleural effusions b/l  - CT chest from 1/16 showing new small bilateral pleural effusions/ atelectasis/ patchy bilateral ground-glass opacities are consistent with pulmonary edema. Repeat XR w/ 2/2 findings c/f edema as well  - s/p zosyn for aspiration PNA from 1/20- 1/28   - POCUS with resolution of b/l pleural effusions, still holding brilinta for possible procedure.  - Discussed w/ family, defer trach/PEG until MRI brain done      =====Cardiovascular=====  On small dose of pressors, wean as tolerated  #HTN  - on home amlodipine and metoprolol currently holding     #CAD  - on Brilinta (holding) aspirin and ranolazine continue   - as per vascular okay to hold Brilinta for possible pleural effusion thoracentesis  -have not heard back from cardiology regarding Brilinta / last stent 2022 , will hold   - on asa suppository -> To oral as pt w/ oral access now    #Afib  - pt with known history of pAF, first observed 2/1  - can consider starting AC and/or rate control if persists     =====GI=====  #upper GI bleed  - s/p EGD showing dieulafoy lesion and esophagitis likely 2/2 NGT irritation s/p 2 clips  - on protonix IV BID continue   - CT on 1/25 with cholelithiasis and sludge HIDA on 1/26 confirming likely acute choley, s/p IR perc cholecystostomy 1/26   - Discussed w/ family, defer trach/PEG until MRI brain done    #Diet  - tube feeds NGT     =====Renal/=====  #Electrolytes  - Maintain K>4, Phos>3, Mag>2, iCal>1  - baseline cr 1.5, at baseline  - on free water flushes for hyperNa. Trend BMP/Na   - Pt w/ contraction alkalosis due to overdiuresis, CTM, dose diamox to keep net negative    =====Endocrine=====  #DM2  - on lantus 12U and q6 SSI  - a1c 9.3, glucose wnl inpatient       =====Infectious Disease=====  #COVID   - s/p paxlovid and 1 dose remdesivir  # PNA  - CT chest showing ground glass opacities  - s/p zosyn  - intermittent episodes of fevers, likely source GI given choleycystitis? culture NGTD  - meropenem 5d course thru 2/1   - now monitoring off antibiotics    =====Heme/Onc=====  #DVT Ppx  - heparin sub-q    =====Ethics=====  FULL CODE.  After family discussion, amenable to MRI first, depending on severity of findings, will then consider trach/PEG. 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     PLAN  =====Neurologic=====  #CVA  - prior CVA x3 with no residual deficits  #myoclonic jerks  - on Keppra 500 mg BID, per neuro wishing to wean however per family request will continue at 500 mg BID, but now off valproate per neuro    - also per neuro, use precedex as needed for clinical sedation as opposed to propofol   - holding home  gabapentin and memantine in setting of somnolence  - continue lexapro   - CT/CTA negative for stroke  - EEG now off   - MRI brain when stable and off EEG - discussed w/ EP, PPM compatible. Safety screening form done.       =====Pulmonary=====  #COVID  - s/p paxlovid and 1 dose remdesivir while inpatient  #Pleural effusions b/l  - CT chest from 1/16 showing new small bilateral pleural effusions/ atelectasis/ patchy bilateral ground-glass opacities are consistent with pulmonary edema. Repeat XR w/ 2/2 findings c/f edema as well  - s/p zosyn for aspiration PNA from 1/20- 1/28   - POCUS with resolution of b/l pleural effusions, still holding brilinta for possible procedure.  - Discussed w/ family, defer trach/PEG until MRI brain done  - Lasix 40 mg IVP x1 for diuresis      =====Cardiovascular=====  On small dose of pressors, wean as tolerated  #HTN  - on home amlodipine and metoprolol currently holding     #CAD  - on Brilinta (holding) aspirin and ranolazine continue   - as per vascular okay to hold Brilinta for possible pleural effusion thoracentesis  -have not heard back from cardiology regarding Brilinta / last stent 2022 , will hold   - on asa suppository -> To oral as pt w/ oral access now    #Afib  - pt with known history of pAF, first observed 2/1  - can consider starting AC and/or rate control if persists     =====GI=====  #upper GI bleed  - s/p EGD showing dieulafoy lesion and esophagitis likely 2/2 NGT irritation s/p 2 clips  - on protonix IV BID continue   - CT on 1/25 with cholelithiasis and sludge HIDA on 1/26 confirming likely acute choley, s/p IR perc cholecystostomy 1/26   - Discussed w/ family, defer trach/PEG until MRI brain done    #Diet  - tube feeds NGT     =====Renal/=====  #Electrolytes  - Maintain K>4, Phos>3, Mag>2, iCal>1  - baseline cr 1.5, at baseline  - on free water flushes for hyperNa. Trend BMP/Na   - Pt w/ contraction alkalosis due to overdiuresis, CTM, dose diamox to keep net negative    =====Endocrine=====  #DM2  - on lantus 12U and q6 SSI  - a1c 9.3, glucose wnl inpatient     =====Infectious Disease=====  #COVID   - s/p paxlovid and 1 dose remdesivir  # PNA  - CT chest showing ground glass opacities  - s/p zosyn  - intermittent episodes of fevers, likely source GI given choleycystitis? culture NGTD  - meropenem 5d course thru 2/1   - now monitoring off antibiotics    =====Heme/Onc=====  #DVT Ppx  - heparin sub-q    =====Ethics=====  FULL CODE.  After family discussion, amenable to MRI first, depending on severity of findings, will then consider trach/PEG.

## 2024-02-07 NOTE — PROGRESS NOTE ADULT - ATTENDING COMMENTS
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 Course c/b acute cholecystitis s/p perc asa 1/26 by IR    Patient failed trial of extubation, also possible sz. More awake today. Was pending tracheostomy but family declined and would want an MRI prior to assess for structural abn prior to tracheostomy.     #Acute hypoxemic/hypercapnic respiratory failure  #Multifocal Pneumonia  # Shock on pressors  #Dysphagia  #Acute cholecystitis  #Encephalopathy  #Afib - New, intermittent  #SMA stent   #Petechial hemorrhages.   - Frequent aspiration noted clinically and on CT scans. now reintubated, unable to clear secretions. 2/2 to poor cough and mental status.   - EEG now without sz, neuro following c/w keppra 500. D/C eeg. Pending MRI. Was cleared by EP for MRI.   - S/P course of antibiotics for MSSA pna as well as aspiration. Continue to monitor off abx.   - C/W vent, adjust based on gas, s/p diamox yesterday, will trend based on blood gas. Will diurese PRN  - S/P IR drainage of biliary tube, monitor output.   - new AFib, not on full ac at this time. was In the setting of critical illness, currently being monitored on tele and NSR.  - C/W ASA, Brilinta on hold for possible procedure. Per vascular can hold temporarily. Will restart soon as patient is trached/PEG.  - Seen by optho, continue to monitor  - oropharyngeal dysphagia will need peg given repeated aspiration/ GI reconsulted.   - shock on pressors, wean as tolerated. Likely 2/2 to vasoplegia.   - Was pending trach and PEG but family now awaiting results of the MRI prior to decision.   - DVT ppx - SQH  - Dispo- family agreeable with trach and peg.

## 2024-02-07 NOTE — PROGRESS NOTE ADULT - SUBJECTIVE AND OBJECTIVE BOX
Aashish Boyer MD  Interventional Cardiology / Advance Heart Failure and Cardiac Transplant Specialist  [facility] Office : [phone] [place] [address] [place] [address]  Tel: [phone]  [facility] : [address] [facility] [place] [address]  Tel: [phone]       Pt is lying in bed intubated  	  MEDICATIONS:  aspirin  chewable 81 milliGRAM(s) Enteral Tube daily  heparin   Injectable 5000 Unit(s) SubCutaneous every 8 hours  norepinephrine Infusion 0.05 MICROgram(s)/kG/Min IV Continuous <Continuous>      albuterol/ipratropium for Nebulization 3 milliLiter(s) Nebulizer every 6 hours  sodium chloride 3%  Inhalation 4 milliLiter(s) Inhalation two times a day    dexMEDEtomidine Infusion 0.2 MICROgram(s)/kG/Hr IV Continuous <Continuous>  escitalopram 10 milliGRAM(s) Oral daily  levETIRAcetam  IVPB 500 milliGRAM(s) IV Intermittent every 12 hours  propofol Infusion 10 MICROgram(s)/kG/Min IV Continuous <Continuous>    pantoprazole  Injectable 40 milliGRAM(s) IV Push every 12 hours  sucralfate suspension 1 Gram(s) Enteral Tube two times a day    dextrose 50% Injectable 25 Gram(s) IV Push once  dextrose 50% Injectable 12.5 Gram(s) IV Push once  dextrose 50% Injectable 25 Gram(s) IV Push once  dextrose Oral Gel 15 Gram(s) Oral once PRN  glucagon  Injectable 1 milliGRAM(s) IntraMuscular once  insulin glargine Injectable (LANTUS) 12 Unit(s) SubCutaneous <User Schedule>  insulin lispro (ADMELOG) corrective regimen sliding scale   SubCutaneous every 6 hours    artificial  tears Solution 1 Drop(s) Left EYE four times a day  chlorhexidine 0.12% Liquid 15 milliLiter(s) Oral Mucosa every 12 hours  chlorhexidine 2% Cloths 1 Application(s) Topical daily  dextrose 5%. 1000 milliLiter(s) IV Continuous <Continuous>  dextrose 5%. 1000 milliLiter(s) IV Continuous <Continuous>  lidocaine   4% Patch 1 Patch Transdermal every 24 hours  petrolatum Ophthalmic Ointment 1 Application(s) Left EYE at bedtime      PAST MEDICAL/SURGICAL HISTORY  PAST MEDICAL & SURGICAL HISTORY:  Hyperlipemia      Hypertension      Coronary Artery Disease      Diabetes Mellitus Type II      Stented Coronary Artery  total 5 stents, last stent 5/2019      Neuropathy      Myocardial infarction      Stroke  mild left facial numbness   no other residuals verbalized      Myoclonic jerking      Stage 3 chronic kidney disease      History of Cataract Extraction      Hx of CABG      H/O coronary angiogram      S/P coronary artery stent placement  1/6/09      S/P placement of cardiac pacemaker          SOCIAL HISTORY: Substance Use (street drugs): ( x ) never used  (  ) other:    FAMILY HISTORY:  No pertinent family history in first degree relatives         PHYSICAL EXAM:  T(C): 36.6 (02-07-24 @ 20:00), Max: 37.7 (02-07-24 @ 12:00)  HR: 90 (02-07-24 @ 22:00) (76 - 121)  BP: 103/64 (02-07-24 @ 22:00) (83/41 - 139/56)  RR: 14 (02-07-24 @ 22:00) (12 - 24)  SpO2: 99% (02-07-24 @ 22:00) (91% - 100%)  Wt(kg): --  I&O's Summary    06 Feb 2024 07:01  -  07 Feb 2024 07:00  --------------------------------------------------------  IN: 1895.4 mL / OUT: 1315 mL / NET: 580.4 mL    07 Feb 2024 07:01  -  07 Feb 2024 22:09  --------------------------------------------------------  IN: 1262.3 mL / OUT: 1788 mL / NET: -525.7 mL          GENERAL: intubated  EYES:   PERRLA   ENMT:   Moist mucous membranes, Good dentition, No lesions  Cardiovascular: Normal S1 S2, No JVD, No murmurs, No edema  Respiratory: b/l rhonchi   Gastrointestinal:  Soft, Non-tender, + BS	  Extremities: no edema                                    7.3    6.96  )-----------( 290      ( 07 Feb 2024 00:10 )             23.7     02-07    139  |  102  |  31<H>  ----------------------------<  181<H>  4.1   |  29  |  1.57<H>    Ca    7.9<L>      07 Feb 2024 00:10  Phos  3.8     02-07  Mg     1.90     02-07    TPro  6.2  /  Alb  2.2<L>  /  TBili  0.2  /  DBili  x   /  AST  22  /  ALT  21  /  AlkPhos  87  02-07    proBNP:   Lipid Profile:   HgA1c:   TSH:     Consultant(s) Notes Reviewed:  [x ] YES  [ ] NO    Care Discussed with Consultants/Other Providers [ x] YES  [ ] NO    Imaging Personally Reviewed independently:  [x] YES  [ ] NO    All labs, radiologic studies, vitals, orders and medications list reviewed. Patient is seen and examined at bedside. Case discussed with medical team.

## 2024-02-07 NOTE — PROGRESS NOTE ADULT - SUBJECTIVE AND OBJECTIVE BOX
Date of Service: 02-07-24 @ 09:23    Patient is a 89y old  Male who presents with a chief complaint of COVID19, Chest pain (07 Feb 2024 07:13)      Any change in ROS: on cpap trial  : awake:  lethargic:     MEDICATIONS  (STANDING):  albuterol/ipratropium for Nebulization 3 milliLiter(s) Nebulizer every 6 hours  artificial  tears Solution 1 Drop(s) Left EYE four times a day  aspirin  chewable 81 milliGRAM(s) Enteral Tube daily  chlorhexidine 0.12% Liquid 15 milliLiter(s) Oral Mucosa every 12 hours  chlorhexidine 2% Cloths 1 Application(s) Topical daily  dexMEDEtomidine Infusion 0.2 MICROgram(s)/kG/Hr (3.97 mL/Hr) IV Continuous <Continuous>  dextrose 5%. 1000 milliLiter(s) (100 mL/Hr) IV Continuous <Continuous>  dextrose 5%. 1000 milliLiter(s) (50 mL/Hr) IV Continuous <Continuous>  dextrose 50% Injectable 25 Gram(s) IV Push once  dextrose 50% Injectable 25 Gram(s) IV Push once  dextrose 50% Injectable 12.5 Gram(s) IV Push once  escitalopram 10 milliGRAM(s) Oral daily  glucagon  Injectable 1 milliGRAM(s) IntraMuscular once  heparin   Injectable 5000 Unit(s) SubCutaneous every 8 hours  insulin glargine Injectable (LANTUS) 12 Unit(s) SubCutaneous <User Schedule>  insulin lispro (ADMELOG) corrective regimen sliding scale   SubCutaneous every 6 hours  levETIRAcetam  IVPB 500 milliGRAM(s) IV Intermittent every 12 hours  lidocaine   4% Patch 1 Patch Transdermal every 24 hours  norepinephrine Infusion 0.05 MICROgram(s)/kG/Min (3.72 mL/Hr) IV Continuous <Continuous>  pantoprazole  Injectable 40 milliGRAM(s) IV Push every 12 hours  petrolatum Ophthalmic Ointment 1 Application(s) Left EYE at bedtime  sodium chloride 3%  Inhalation 4 milliLiter(s) Inhalation two times a day  sucralfate suspension 1 Gram(s) Enteral Tube two times a day    MEDICATIONS  (PRN):  dextrose Oral Gel 15 Gram(s) Oral once PRN Blood Glucose LESS THAN 70 milliGRAM(s)/deciliter    Vital Signs Last 24 Hrs  T(C): 36.8 (07 Feb 2024 08:00), Max: 37.3 (07 Feb 2024 04:00)  T(F): 98.2 (07 Feb 2024 08:00), Max: 99.2 (07 Feb 2024 04:00)  HR: 97 (07 Feb 2024 09:12) (83 - 121)  BP: 122/64 (07 Feb 2024 09:00) (112/49 - 139/56)  BP(mean): 76 (07 Feb 2024 09:00) (62 - 84)  RR: 19 (07 Feb 2024 09:00) (13 - 21)  SpO2: 98% (07 Feb 2024 09:12) (96% - 100%)    Parameters below as of 07 Feb 2024 09:12  Patient On (Oxygen Delivery Method): ventilator      Mode: CPAP with PS  FiO2: 30  PEEP: 5  PS: 10  MAP: 8  PIP: 17    I&O's Summary    06 Feb 2024 07:01  -  07 Feb 2024 07:00  --------------------------------------------------------  IN: 1895.4 mL / OUT: 1315 mL / NET: 580.4 mL    07 Feb 2024 07:01  -  07 Feb 2024 09:23  --------------------------------------------------------  IN: 88.7 mL / OUT: 158 mL / NET: -69.3 mL          Physical Exam:   GENERAL: NAD, well-groomed, well-developed  HEENT: JAVAD/   Atraumatic, Normocephalic  ENMT: No tonsillar erythema, exudates, or enlargement; Moist mucous membranes, Good dentition, No lesions  NECK: Supple, No JVD, Normal thyroid  CHEST/LUNG: Clear to auscultaion, ; No rales, rhonchi, wheezing, or rubs  CVS: Regular rate and rhythm; No murmurs, rubs, or gallops  GI: : Soft, Nontender, Nondistended; Bowel sounds present  NERVOUS SYSTEM:  Alert & awake  EXTREMITIES: - edema  LYMPH: No lymphadenopathy noted  SKIN: No rashes or lesions  ENDOCRINOLOGY: No Thyromegaly  PSYCH: calm     Labs:  ABG - ( 06 Feb 2024 00:40 )  pH, Arterial: 7.44  pH, Blood: x     /  pCO2: 48    /  pO2: 136   / HCO3: 33    / Base Excess: 7.6   /  SaO2: 99.0            32, 34                            7.3    6.96  )-----------( 290      ( 07 Feb 2024 00:10 )             23.7                         7.2    7.33  )-----------( 308      ( 06 Feb 2024 00:40 )             22.6                         7.7    8.14  )-----------( 357      ( 05 Feb 2024 01:58 )             24.1                         7.4    7.13  )-----------( 369      ( 04 Feb 2024 02:46 )             24.0                         7.1    8.13  )-----------( 359      ( 03 Feb 2024 10:40 )             22.2     02-07    139  |  102  |  31<H>  ----------------------------<  181<H>  4.1   |  29  |  1.57<H>  02-06    140  |  103  |  33<H>  ----------------------------<  108<H>  3.9   |  27  |  1.58<H>  02-05    139  |  99  |  34<H>  ----------------------------<  140<H>  3.9   |  30  |  1.73<H>  02-04    139  |  99  |  33<H>  ----------------------------<  241<H>  3.6   |  30  |  1.75<H>  02-04    144  |  103  |  32<H>  ----------------------------<  148<H>  3.7   |  31  |  1.87<H>  02-03    147<H>  |  107  |  35<H>  ----------------------------<  223<H>  3.7   |  30  |  1.84<H>    Ca    7.9<L>      07 Feb 2024 00:10  Ca    7.7<L>      06 Feb 2024 00:40  Phos  3.8     02-07  Phos  3.3     02-06  Mg     1.90     02-07  Mg     1.90     02-06    TPro  6.2  /  Alb  2.2<L>  /  TBili  0.2  /  DBili  x   /  AST  22  /  ALT  21  /  AlkPhos  87  02-07  TPro  5.9<L>  /  Alb  2.1<L>  /  TBili  0.2  /  DBili  x   /  AST  19  /  ALT  19  /  AlkPhos  93  02-06  TPro  6.2  /  Alb  2.2<L>  /  TBili  0.2  /  DBili  x   /  AST  24  /  ALT  29  /  AlkPhos  110  02-05  TPro  6.0  /  Alb  2.2<L>  /  TBili  0.2  /  DBili  x   /  AST  29  /  ALT  37  /  AlkPhos  110  02-04  TPro  5.8<L>  /  Alb  2.3<L>  /  TBili  0.3  /  DBili  x   /  AST  42<H>  /  ALT  46<H>  /  AlkPhos  97  02-03    CAPILLARY BLOOD GLUCOSE      POCT Blood Glucose.: 239 mg/dL (07 Feb 2024 05:02)  POCT Blood Glucose.: 185 mg/dL (06 Feb 2024 23:51)  POCT Blood Glucose.: 204 mg/dL (06 Feb 2024 17:28)  POCT Blood Glucose.: 145 mg/dL (06 Feb 2024 11:47)      LIVER FUNCTIONS - ( 07 Feb 2024 00:10 )  Alb: 2.2 g/dL / Pro: 6.2 g/dL / ALK PHOS: 87 U/L / ALT: 21 U/L / AST: 22 U/L / GGT: x           PT/INR - ( 07 Feb 2024 00:10 )   PT: 11.4 sec;   INR: 1.02 ratio         PTT - ( 07 Feb 2024 00:10 )  PTT:33.3 sec  Urinalysis Basic - ( 07 Feb 2024 00:10 )    Color: x / Appearance: x / SG: x / pH: x  Gluc: 181 mg/dL / Ketone: x  / Bili: x / Urobili: x   Blood: x / Protein: x / Nitrite: x   Leuk Esterase: x / RBC: x / WBC x   Sq Epi: x / Non Sq Epi: x / Bacteria: x      rad< from: Xray Chest 1 View- PORTABLE-Urgent (Xray Chest 1 View- PORTABLE-Urgent .) (02.02.24 @ 16:39) >    ACC: [id number] EXAM:  XR CHEST PORTABLE URGENT 1V   ORDERED BY: JASMEET PEREZ     PROCEDURE DATE:  02/02/2024          INTERPRETATION:  EXAMINATION: XR CHEST URGENT    CLINICAL INDICATION: sp intubation    TECHNIQUE: Single frontal, portable view of the chest was obtained.    COMPARISON: Chest x-ray 1/31/2024.    IMPRESSION:  Intubated with ETT tip at clavicular level. Enteric tube extends below   diaphragm into left hemiabdomen with tip extending beyond imaged field of   view. Partially visualized pigtail drainage tubes in right hemiabdomen   near inferior image margin.    Stable sternal wires left chest wall dual-lead pacemaker and prominent   appearing cardiac and mediastinal silhouettes.    Redemonstrated diffusely prominent hazy bilateral lung markings probably   reflecting moderate to severe congestion/edema however concomitant   superimposed infection in proper clinical context cannot be excluded.    Hazy lung bases and CP regions could be due to overlying soft tissues   and/or small pleural effusions. No pneumothorax.    --- End of Report ---          RADHA ROD MD; Resident Radiologist  This document has been electronically signed.  NORAH GREGORY MD; Attending Radiologist  This document has been electronically signed. Feb  3 2024 12:32PM    < end of copied text >        RECENT CULTURES:        RESPIRATORY CULTURES:          Studies  Chest X-RAY  CT SCAN Chest   Venous Dopplers: LE:   CT Abdomen  Others

## 2024-02-07 NOTE — PROGRESS NOTE ADULT - ASSESSMENT
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 Patient states that he has been having a dry cough for the past week, worsened over the past few days. Denies SOB with the cough, denies hemoptysis. Reports fevers at home to 101.5 with associated diaphoresis. Said that he has significant pinprick like b/l chest pain with his cough but denies any chest pain when not coughing or with ambulation. Also reports rhinorrhea and sore throat. Reports generalized weakness and poor appetite. States his daughter was visiting him over the week end last week and she was positive for COVID19. Patient tested positive for COVID19 2 days prior and was started on paxlovid as outpatient. Of note, family states that the patient has had worsening confusion at home over the past 6 months (becoming disoriented to time) but recently has been more confused, talking about seeing people that are not present.   On arrival to the ED his vitals were T 98 -> 99.2, P 80, /72, RR 18, ) sat 100% RA. His lab work showed leukocytosis with neutrophilia and bandemia, MABLE, mild hyponatremia, indeterminant but stable troponins, and elevated lactate to 2.3. His COVID19 swab was positive. His CXR showed bibasilar crackles.  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   -on valproate : neurology following   Covid / Resp failure/on 2 L of oxygen:  -he was treated for covid before:   -he has excessive secretions  -keep o2 sao2 above 90%  -add BD and mucomyst: ;  -add mucinex:   1/10: he seems to be doing  ok: cont mucomyst and bd   1/11 ?: add benzonatate and Robitussin prm : dc dornase  1/12: seems OK: no sob:  no cough : no phlegm : has gurgling sound in throat:  looks comfortable  1/14: he is on room air:  with good sao2:  finished covid rx:  cont current rx:  high risk for aspiration as he has gurgling secretions in throat   1/15: would do ct chest he seems to have increasing effusions:  his ct scan from last week of december:  has not much of effusions: however will try lasix : [facility]    1/16: doing  ok : no os:b has secretions still : change to percussion bed:  cont bd  1/17: seems stable:  no sob: on 3 L of oxygen : should add ATC lasix to me as he has formed new pleural effusions:  echo with mod AS   1/18; dw pts son : who is a neurologist:  he has secretions in chest with atelectasis and yovani effusions with increased secretions:  the son requests bronchoscopy:  I have explained the advantages and disadvantages of the bronch at this age and he might not be able to be extubated soon after procedure:  but they want to go ahead with it: IP called   He will remain art risk for recurrent aspiration and atelectasis even after the procedure and they understand that    1/19: FOR BRONCH TODAY  : AGAIN CONFIRMED WITH NEUROLOGISTS SON  1/20 : events noted:  was successful extubated after the procedure:  but then he desaturated with increasing secretions and ended up on high flow and adm to MICU : on recent chest xray has mod large effusion on rigth side:  I think it needs a tap:  would defer to [facility] MICU team   ; [facility] cultures are still pending  1/21: dw fellow  consider tapping on rigth saide:  his PCP and son at bedside:  he is not obviously sob but still on 100%  high flow   1/22: he is doing poorly:  WBC increased:  bronch culturs with staph : nares with staph ? add vanco:  defer to MICU : in addition:  he has large effusion 0on rigth side: ? chest tube:  but brillinta needs to be stopped : [facility] attending  1/23: got intubated : sedated on vasopressors:  s/p brocnh : [facility] attending again  : possibly needs tap on rigth side   1/24: cont current rx:  defer to primary team  : s/p bronch   -cont current medications   1/28: seems same to me  : intubated and is on precedex:  cont current pulm care:  on antibiotics  1/29: on cpap trials today : for possible extubation;  son at bedside; cont antibiotics  1/30: seems to be doing ok : intubated and on precedex  : on cpap trials:  extubation per primary team  2/1: extubated:  on bipap:  monitor abg:  on meropenem  2/2: clinically looks the same:  not much improved mental status: ABG last night was pretty reasonable:  cont to monitor  2/3: intubated again for myotonic jerks:  cont current treatment:    2/5: intubated:  unable to be extubated:  now possible trach and peg per family   2/6: intubated and sedated  on Precedex  for eventually trach and peg  : with family agreement  2/4 : intubated: on cpap trials:  now family deciding about trach and peg       DM  monitor and control   CKD  -cont current meds   HTN   -controlled  1/28:   -not on vasopressors:  antihypertensives being held  1/29: remains hemodynamically stable   1/30 : blood pressure  1/31: remained off pressors  2/2 hemodynamically stable: off pressors  2/3: he is on vasopressors now:   2/5: cont to be on vasopressors:   2/6; on low dose vasopressors:  wean as tolerated   2/7: on low vasopressors:   CVA X 3  -doing ok :     dw family  and team  1/28: currently intubated   1/29: on cpap trials for possible extubation   1/30: intubated:  and precedex  1/31; off precedex but still lethargic : defer to neurology / MICU    2/2: neuro follow up : has had cvx x 3 before  2/4: per neuro    2/7: seems OK:   GI Bleed:   -secondary to Dieulafoy's lesion:  defer to primary team :    dvt prophylaxis:    dw team                This is a 89M with history of CAD s/p CABG s/p stents (last stent May 2022), s/p PPM, DM2, CKD (baseline Cr 1.2-1.3 as per family), PVD, HTN, HLD, CVA x3 (without residual deficits), and 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 Patient states that he has been having a dry cough for the past week, worsened over the past few days. Denies SOB with the cough, denies hemoptysis. Reports fevers at home to 101.5 with associated diaphoresis. Said that he has significant pinprick like b/l chest pain with his cough but denies any chest pain when not coughing or with ambulation. Also reports rhinorrhea and sore throat. Reports generalized weakness and poor appetite. States his daughter was visiting him over the week end last week and she was positive for COVID19. Patient tested positive for COVID19 2 days prior and was started on paxlovid as outpatient. Of note, family states that the patient has had worsening confusion at home over the past 6 months (becoming disoriented to time) but recently has been more confused, talking about seeing people that are not present.   On arrival to the ED his vitals were T 98 -> 99.2, P 80, /72, RR 18, ) sat 100% RA. His lab work showed leukocytosis with neutrophilia and bandemia, MABLE, mild hyponatremia, indeterminant but stable troponins, and elevated lactate to 2.3. His COVID19 swab was positive. His CXR showed bibasilar crackles.  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   -on valproate : neurology following   Covid / Resp failure/on 2 L of oxygen:  -he was treated for covid before:   -he has excessive secretions  -keep o2 sao2 above 90%  -add BD and mucomyst: ;  -add mucinex:   1/10: he seems to be doing  ok: cont mucomyst and bd   1/11 ?: add benzonatate and Robitussin prm : dc dornase  1/12: seems OK: no sob:  no cough : no phlegm : has gurgling sound in throat:  looks comfortable  1/14: he is on room air:  with good sao2:  finished covid rx:  cont current rx:  high risk for aspiration as he has gurgling secretions in throat   1/15: would do ct chest he seems to have increasing effusions:  his ct scan from last week of december:  has not much of effusions: however will try lasix : [facility]    1/16: doing  ok : no os:b has secretions still : change to percussion bed:  cont bd  1/17: seems stable:  no sob: on 3 L of oxygen : should add ATC lasix to me as he has formed new pleural effusions:  echo with mod AS   1/18; dw pts son : who is a neurologist:  he has secretions in chest with atelectasis and yovani effusions with increased secretions:  the son requests bronchoscopy:  I have explained the advantages and disadvantages of the bronch at this age and he might not be able to be extubated soon after procedure:  but they want to go ahead with it: IP called   He will remain art risk for recurrent aspiration and atelectasis even after the procedure and they understand that    1/19: FOR BRONCH TODAY  : AGAIN CONFIRMED WITH NEUROLOGISTS SON  1/20 : events noted:  was successful extubated after the procedure:  but then he desaturated with increasing secretions and ended up on high flow and adm to MICU : on recent chest xray has mod large effusion on rigth side:  I think it needs a tap:  would defer to [facility] MICU team   ; [facility] cultures are still pending  1/21: dw fellow  consider tapping on rigth saide:  his PCP and son at bedside:  he is not obviously sob but still on 100%  high flow   1/22: he is doing poorly:  WBC increased:  bronch culturs with staph : nares with staph ? add vanco:  defer to MICU : in addition:  he has large effusion 0on rigth side: ? chest tube:  but brillinta needs to be stopped : [facility] attending  1/23: got intubated : sedated on vasopressors:  s/p brocnh : [facility] attending again  : possibly needs tap on rigth side   1/24: cont current rx:  defer to primary team  : s/p bronch   -cont current medications   1/28: seems same to me  : intubated and is on precedex:  cont current pulm care:  on antibiotics  1/29: on cpap trials today : for possible extubation;  son at bedside; cont antibiotics  1/30: seems to be doing ok : intubated and on precedex  : on cpap trials:  extubation per primary team  2/1: extubated:  on bipap:  monitor abg:  on meropenem  2/2: clinically looks the same:  not much improved mental status: ABG last night was pretty reasonable:  cont to monitor  2/3: intubated again for myotonic jerks:  cont current treatment:    2/5: intubated:  unable to be extubated:  now possible trach and peg per family   2/6: intubated and sedated  on Precedex  for eventually trach and peg  : with family agreement  2/4 : intubated: on cpap trials:  now family deciding about trach and peg       DM  monitor and control   CKD  -cont current meds   HTN   -controlled  1/28:   -not on vasopressors:  antihypertensives being held  1/29: remains hemodynamically stable   1/30 : blood pressure  1/31: remained off pressors  2/2 hemodynamically stable: off pressors  2/3: he is on vasopressors now:   2/5: cont to be on vasopressors:   2/6; on low dose vasopressors:  wean as tolerated   2/7: on low vasopressors:   CVA X 3  -doing ok :     dw family  and team  1/28: currently intubated   1/29: on cpap trials for possible extubation   1/30: intubated:  and precedex  1/31; off precedex but still lethargic : defer to neurology / MICU    2/2: neuro follow up : has had cvx x 3 before  2/4: per neuro    2/7: seems OK:   GI Bleed:   -secondary to Dieulafoy's lesion:  defer to primary team :    dvt prophylaxis:    dw team :  will sign off:  ;once he comes out of MICU on regular floor:  please mike Dr Ramirez  again at

## 2024-02-07 NOTE — PROGRESS NOTE ADULT - SUBJECTIVE AND OBJECTIVE BOX
NEPHROLOGY     Patient seen and examined.    MEDICATIONS  (STANDING):  albuterol/ipratropium for Nebulization 3 milliLiter(s) Nebulizer every 6 hours  artificial  tears Solution 1 Drop(s) Left EYE four times a day  aspirin  chewable 81 milliGRAM(s) Enteral Tube daily  chlorhexidine 0.12% Liquid 15 milliLiter(s) Oral Mucosa every 12 hours  chlorhexidine 2% Cloths 1 Application(s) Topical daily  dexMEDEtomidine Infusion 0.2 MICROgram(s)/kG/Hr (3.97 mL/Hr) IV Continuous <Continuous>  dextrose 5%. 1000 milliLiter(s) (100 mL/Hr) IV Continuous <Continuous>  dextrose 5%. 1000 milliLiter(s) (50 mL/Hr) IV Continuous <Continuous>  dextrose 50% Injectable 25 Gram(s) IV Push once  dextrose 50% Injectable 12.5 Gram(s) IV Push once  dextrose 50% Injectable 25 Gram(s) IV Push once  escitalopram 10 milliGRAM(s) Oral daily  glucagon  Injectable 1 milliGRAM(s) IntraMuscular once  heparin   Injectable 5000 Unit(s) SubCutaneous every 8 hours  insulin glargine Injectable (LANTUS) 12 Unit(s) SubCutaneous <User Schedule>  insulin lispro (ADMELOG) corrective regimen sliding scale   SubCutaneous every 6 hours  levETIRAcetam  IVPB 500 milliGRAM(s) IV Intermittent every 12 hours  lidocaine   4% Patch 1 Patch Transdermal every 24 hours  norepinephrine Infusion 0.05 MICROgram(s)/kG/Min (3.72 mL/Hr) IV Continuous <Continuous>  pantoprazole  Injectable 40 milliGRAM(s) IV Push every 12 hours  petrolatum Ophthalmic Ointment 1 Application(s) Left EYE at bedtime  sodium chloride 3%  Inhalation 4 milliLiter(s) Inhalation two times a day  sucralfate suspension 1 Gram(s) Enteral Tube two times a day    VITALS:  T(C): , Max: 37.3 (02-07-24 @ 04:00)  T(F): , Max: 99.2 (02-07-24 @ 04:00)  HR: 104 (02-07-24 @ 11:00)  BP: 111/72 (02-07-24 @ 11:00)  BP(mean): 80 (02-07-24 @ 11:00)  RR: 21 (02-07-24 @ 11:00)  SpO2: 99% (02-07-24 @ 11:00)  Wt(kg): --  I and O's:    02-06 @ 07:01  -  02-07 @ 07:00  --------------------------------------------------------  IN: 1895.4 mL / OUT: 1315 mL / NET: 580.4 mL    PHYSICAL EXAM:  Constitutional: no distress   HEENT: on bipap  Neck: No LAD, No JVD  Respiratory: diminished b/l  Cardiovascular: S1 and S2  Gastrointestinal: BS+, soft, NT/ND  Extremities: + l/e edema  Neurological: UTO  : + Moss  Skin: No rashes     LABS:                        7.3    6.96  )-----------( 290      ( 07 Feb 2024 00:10 )             23.7     02-07    139  |  102  |  31<H>  ----------------------------<  181<H>  4.1   |  29  |  1.57<H>    Ca    7.9<L>      07 Feb 2024 00:10  Phos  3.8     02-07  Mg     1.90     02-07    TPro  6.2  /  Alb  2.2<L>  /  TBili  0.2  /  DBili  x   /  AST  22  /  ALT  21  /  AlkPhos  87  02-07   NEPHROLOGY     Patient seen and examined with family at bedside, in no acute distress.     MEDICATIONS  (STANDING):  albuterol/ipratropium for Nebulization 3 milliLiter(s) Nebulizer every 6 hours  artificial  tears Solution 1 Drop(s) Left EYE four times a day  aspirin  chewable 81 milliGRAM(s) Enteral Tube daily  chlorhexidine 0.12% Liquid 15 milliLiter(s) Oral Mucosa every 12 hours  chlorhexidine 2% Cloths 1 Application(s) Topical daily  dexMEDEtomidine Infusion 0.2 MICROgram(s)/kG/Hr (3.97 mL/Hr) IV Continuous <Continuous>  dextrose 5%. 1000 milliLiter(s) (100 mL/Hr) IV Continuous <Continuous>  dextrose 5%. 1000 milliLiter(s) (50 mL/Hr) IV Continuous <Continuous>  dextrose 50% Injectable 25 Gram(s) IV Push once  dextrose 50% Injectable 12.5 Gram(s) IV Push once  dextrose 50% Injectable 25 Gram(s) IV Push once  escitalopram 10 milliGRAM(s) Oral daily  glucagon  Injectable 1 milliGRAM(s) IntraMuscular once  heparin   Injectable 5000 Unit(s) SubCutaneous every 8 hours  insulin glargine Injectable (LANTUS) 12 Unit(s) SubCutaneous <User Schedule>  insulin lispro (ADMELOG) corrective regimen sliding scale   SubCutaneous every 6 hours  levETIRAcetam  IVPB 500 milliGRAM(s) IV Intermittent every 12 hours  lidocaine   4% Patch 1 Patch Transdermal every 24 hours  norepinephrine Infusion 0.05 MICROgram(s)/kG/Min (3.72 mL/Hr) IV Continuous <Continuous>  pantoprazole  Injectable 40 milliGRAM(s) IV Push every 12 hours  petrolatum Ophthalmic Ointment 1 Application(s) Left EYE at bedtime  sodium chloride 3%  Inhalation 4 milliLiter(s) Inhalation two times a day  sucralfate suspension 1 Gram(s) Enteral Tube two times a day    VITALS:  T(C): , Max: 37.3 (02-07-24 @ 04:00)  T(F): , Max: 99.2 (02-07-24 @ 04:00)  HR: 104 (02-07-24 @ 11:00)  BP: 111/72 (02-07-24 @ 11:00)  BP(mean): 80 (02-07-24 @ 11:00)  RR: 21 (02-07-24 @ 11:00)  SpO2: 99% (02-07-24 @ 11:00)  Wt(kg): --  I and O's:    02-06 @ 07:01  -  02-07 @ 07:00  --------------------------------------------------------  IN: 1895.4 mL / OUT: 1315 mL / NET: 580.4 mL    PHYSICAL EXAM:  Constitutional: no distress   HEENT: on bipap  Neck: No LAD, No JVD  Respiratory: diminished b/l  Cardiovascular: S1 and S2  Gastrointestinal: BS+, soft, NT/ND  Extremities: + l/e edema  Neurological: UTO  : + Moss  Skin: No rashes     LABS:                        7.3    6.96  )-----------( 290      ( 07 Feb 2024 00:10 )             23.7     02-07    139  |  102  |  31<H>  ----------------------------<  181<H>  4.1   |  29  |  1.57<H>    Ca    7.9<L>      07 Feb 2024 00:10  Phos  3.8     02-07  Mg     1.90     02-07    TPro  6.2  /  Alb  2.2<L>  /  TBili  0.2  /  DBili  x   /  AST  22  /  ALT  21  /  AlkPhos  87  02-07

## 2024-02-07 NOTE — CHART NOTE - NSCHARTNOTEFT_GEN_A_CORE
: Hattie Mcintosh    INDICATION:     PROCEDURE:  [x] LIMITED ECHO  [x] LIMITED CHEST  [ ] LIMITED RETROPERITONEAL  [ ] LIMITED ABDOMINAL  [ ] LIMITED DVT  [ ] NEEDLE GUIDANCE VASCULAR  [ ] NEEDLE GUIDANCE THORACENTESIS  [ ] NEEDLE GUIDANCE PARACENTESIS  [ ] NEEDLE GUIDANCE PERICARDIOCENTESIS  [ ] OTHER    FINDINGS/INTERPRETATION: : Hattie Mcintosh    INDICATION:     PROCEDURE:  [x] LIMITED ECHO  [x] LIMITED CHEST  [ ] LIMITED RETROPERITONEAL  [ ] LIMITED ABDOMINAL  [ ] LIMITED DVT  [ ] NEEDLE GUIDANCE VASCULAR  [ ] NEEDLE GUIDANCE THORACENTESIS  [ ] NEEDLE GUIDANCE PARACENTESIS  [ ] NEEDLE GUIDANCE PERICARDIOCENTESIS  [ ] OTHER    FINDINGS/INTERPRETATION:  B/l B lines in apical view with moderate effusion in posterior views  Visually normal/hyperdymanic LV function with appropriate outflow, RV function appears normal. Normal VTI in four chamber view. No significant regurgitation seen across mitral, tricuspid and aortic valves in PSL, PSS, apical 4, and subxiphoid view. : Hattie Mcintosh    INDICATION:     PROCEDURE:  [x] LIMITED ECHO  [x] LIMITED CHEST  [ ] LIMITED RETROPERITONEAL  [ ] LIMITED ABDOMINAL  [ ] LIMITED DVT  [ ] NEEDLE GUIDANCE VASCULAR  [ ] NEEDLE GUIDANCE THORACENTESIS  [ ] NEEDLE GUIDANCE PARACENTESIS  [ ] NEEDLE GUIDANCE PERICARDIOCENTESIS  [ ] OTHER    FINDINGS/INTERPRETATION:  B/l B lines in apical view with moderate effusion in posterior views  Visually normal/hyperdymanic LV function with appropriate outflow, RV function appears normal. Normal VTI in four chamber view. No significant regurgitation seen across mitral, tricuspid and aortic valves in PSL, PSS, apical 4, and subxiphoid view.    Attending note :  I was present for the duration of the procedure and supervised as needed.

## 2024-02-08 LAB
ALBUMIN SERPL ELPH-MCNC: 2.6 G/DL — LOW (ref 3.3–5)
ALP SERPL-CCNC: 105 U/L — SIGNIFICANT CHANGE UP (ref 40–120)
ALT FLD-CCNC: 16 U/L — SIGNIFICANT CHANGE UP (ref 4–41)
ANION GAP SERPL CALC-SCNC: 8 MMOL/L — SIGNIFICANT CHANGE UP (ref 7–14)
APTT BLD: 31.8 SEC — SIGNIFICANT CHANGE UP (ref 24.5–35.6)
AST SERPL-CCNC: 23 U/L — SIGNIFICANT CHANGE UP (ref 4–40)
BASE EXCESS BLDV CALC-SCNC: 6.4 MMOL/L — HIGH (ref -2–3)
BILIRUB SERPL-MCNC: 0.3 MG/DL — SIGNIFICANT CHANGE UP (ref 0.2–1.2)
BLD GP AB SCN SERPL QL: NEGATIVE — SIGNIFICANT CHANGE UP
BLOOD GAS VENOUS COMPREHENSIVE RESULT: SIGNIFICANT CHANGE UP
BUN SERPL-MCNC: 31 MG/DL — HIGH (ref 7–23)
CALCIUM SERPL-MCNC: 8.1 MG/DL — LOW (ref 8.4–10.5)
CHLORIDE BLDV-SCNC: 103 MMOL/L — SIGNIFICANT CHANGE UP (ref 96–108)
CHLORIDE SERPL-SCNC: 100 MMOL/L — SIGNIFICANT CHANGE UP (ref 98–107)
CO2 BLDV-SCNC: 32.6 MMOL/L — HIGH (ref 22–26)
CO2 SERPL-SCNC: 29 MMOL/L — SIGNIFICANT CHANGE UP (ref 22–31)
CREAT SERPL-MCNC: 1.58 MG/DL — HIGH (ref 0.5–1.3)
EGFR: 41 ML/MIN/1.73M2 — LOW
GAS PNL BLDV: 135 MMOL/L — LOW (ref 136–145)
GLUCOSE BLDC GLUCOMTR-MCNC: 173 MG/DL — HIGH (ref 70–99)
GLUCOSE BLDC GLUCOMTR-MCNC: 189 MG/DL — HIGH (ref 70–99)
GLUCOSE BLDC GLUCOMTR-MCNC: 221 MG/DL — HIGH (ref 70–99)
GLUCOSE BLDV-MCNC: 180 MG/DL — HIGH (ref 70–99)
GLUCOSE SERPL-MCNC: 181 MG/DL — HIGH (ref 70–99)
HCO3 BLDV-SCNC: 31 MMOL/L — HIGH (ref 22–29)
HCT VFR BLD CALC: 24.9 % — LOW (ref 39–50)
HCT VFR BLDA CALC: 23 % — LOW (ref 39–51)
HGB BLD CALC-MCNC: 7.6 G/DL — LOW (ref 12.6–17.4)
HGB BLD-MCNC: 7.8 G/DL — LOW (ref 13–17)
INR BLD: 1.02 RATIO — SIGNIFICANT CHANGE UP (ref 0.85–1.18)
LACTATE BLDV-MCNC: 1 MMOL/L — SIGNIFICANT CHANGE UP (ref 0.5–2)
MAGNESIUM SERPL-MCNC: 2.1 MG/DL — SIGNIFICANT CHANGE UP (ref 1.6–2.6)
MCHC RBC-ENTMCNC: 29.8 PG — SIGNIFICANT CHANGE UP (ref 27–34)
MCHC RBC-ENTMCNC: 31.3 GM/DL — LOW (ref 32–36)
MCV RBC AUTO: 95 FL — SIGNIFICANT CHANGE UP (ref 80–100)
NRBC # BLD: 0 /100 WBCS — SIGNIFICANT CHANGE UP (ref 0–0)
NRBC # FLD: 0 K/UL — SIGNIFICANT CHANGE UP (ref 0–0)
PCO2 BLDV: 46 MMHG — SIGNIFICANT CHANGE UP (ref 42–55)
PH BLDV: 7.44 — HIGH (ref 7.32–7.43)
PHOSPHATE SERPL-MCNC: 3.1 MG/DL — SIGNIFICANT CHANGE UP (ref 2.5–4.5)
PLATELET # BLD AUTO: 303 K/UL — SIGNIFICANT CHANGE UP (ref 150–400)
PO2 BLDV: 84 MMHG — HIGH (ref 25–45)
POTASSIUM BLDV-SCNC: 4.1 MMOL/L — SIGNIFICANT CHANGE UP (ref 3.5–5.1)
POTASSIUM SERPL-MCNC: 4 MMOL/L — SIGNIFICANT CHANGE UP (ref 3.5–5.3)
POTASSIUM SERPL-SCNC: 4 MMOL/L — SIGNIFICANT CHANGE UP (ref 3.5–5.3)
PROT SERPL-MCNC: 6.3 G/DL — SIGNIFICANT CHANGE UP (ref 6–8.3)
PROTHROM AB SERPL-ACNC: 11.4 SEC — SIGNIFICANT CHANGE UP (ref 9.5–13)
RBC # BLD: 2.62 M/UL — LOW (ref 4.2–5.8)
RBC # FLD: 17.5 % — HIGH (ref 10.3–14.5)
RH IG SCN BLD-IMP: POSITIVE — SIGNIFICANT CHANGE UP
SAO2 % BLDV: 97.6 % — HIGH (ref 67–88)
SODIUM SERPL-SCNC: 137 MMOL/L — SIGNIFICANT CHANGE UP (ref 135–145)
WBC # BLD: 7.15 K/UL — SIGNIFICANT CHANGE UP (ref 3.8–10.5)
WBC # FLD AUTO: 7.15 K/UL — SIGNIFICANT CHANGE UP (ref 3.8–10.5)

## 2024-02-08 PROCEDURE — 70553 MRI BRAIN STEM W/O & W/DYE: CPT | Mod: 26

## 2024-02-08 PROCEDURE — 71045 X-RAY EXAM CHEST 1 VIEW: CPT | Mod: 26

## 2024-02-08 PROCEDURE — 93308 TTE F-UP OR LMTD: CPT | Mod: 26,GC

## 2024-02-08 PROCEDURE — 99291 CRITICAL CARE FIRST HOUR: CPT

## 2024-02-08 PROCEDURE — 76604 US EXAM CHEST: CPT | Mod: 26,GC

## 2024-02-08 RX ORDER — MIDODRINE HYDROCHLORIDE 2.5 MG/1
10 TABLET ORAL EVERY 8 HOURS
Refills: 0 | Status: DISCONTINUED | OUTPATIENT
Start: 2024-02-08 | End: 2024-02-09

## 2024-02-08 RX ORDER — PROPOFOL 10 MG/ML
10 INJECTION, EMULSION INTRAVENOUS
Qty: 1000 | Refills: 0 | Status: DISCONTINUED | OUTPATIENT
Start: 2024-02-08 | End: 2024-02-08

## 2024-02-08 RX ORDER — NOREPINEPHRINE BITARTRATE/D5W 8 MG/250ML
0.05 PLASTIC BAG, INJECTION (ML) INTRAVENOUS
Qty: 16 | Refills: 0 | Status: DISCONTINUED | OUTPATIENT
Start: 2024-02-08 | End: 2024-02-09

## 2024-02-08 RX ORDER — FUROSEMIDE 40 MG
40 TABLET ORAL ONCE
Refills: 0 | Status: COMPLETED | OUTPATIENT
Start: 2024-02-08 | End: 2024-02-08

## 2024-02-08 RX ORDER — MIDAZOLAM HYDROCHLORIDE 1 MG/ML
2 INJECTION, SOLUTION INTRAMUSCULAR; INTRAVENOUS ONCE
Refills: 0 | Status: DISCONTINUED | OUTPATIENT
Start: 2024-02-08 | End: 2024-02-09

## 2024-02-08 RX ADMIN — HEPARIN SODIUM 5000 UNIT(S): 5000 INJECTION INTRAVENOUS; SUBCUTANEOUS at 05:40

## 2024-02-08 RX ADMIN — Medication 2: at 18:10

## 2024-02-08 RX ADMIN — LIDOCAINE 1 PATCH: 4 CREAM TOPICAL at 06:49

## 2024-02-08 RX ADMIN — Medication 1 DROP(S): at 06:49

## 2024-02-08 RX ADMIN — SODIUM CHLORIDE 4 MILLILITER(S): 9 INJECTION INTRAMUSCULAR; INTRAVENOUS; SUBCUTANEOUS at 22:36

## 2024-02-08 RX ADMIN — Medication 40 MILLIGRAM(S): at 11:57

## 2024-02-08 RX ADMIN — MIDODRINE HYDROCHLORIDE 10 MILLIGRAM(S): 2.5 TABLET ORAL at 22:20

## 2024-02-08 RX ADMIN — CHLORHEXIDINE GLUCONATE 15 MILLILITER(S): 213 SOLUTION TOPICAL at 05:41

## 2024-02-08 RX ADMIN — DEXMEDETOMIDINE HYDROCHLORIDE IN 0.9% SODIUM CHLORIDE 3.97 MICROGRAM(S)/KG/HR: 4 INJECTION INTRAVENOUS at 19:45

## 2024-02-08 RX ADMIN — Medication 1 GRAM(S): at 18:11

## 2024-02-08 RX ADMIN — PANTOPRAZOLE SODIUM 40 MILLIGRAM(S): 20 TABLET, DELAYED RELEASE ORAL at 05:41

## 2024-02-08 RX ADMIN — HEPARIN SODIUM 5000 UNIT(S): 5000 INJECTION INTRAVENOUS; SUBCUTANEOUS at 22:20

## 2024-02-08 RX ADMIN — Medication 1 DROP(S): at 18:08

## 2024-02-08 RX ADMIN — LEVETIRACETAM 400 MILLIGRAM(S): 250 TABLET, FILM COATED ORAL at 18:09

## 2024-02-08 RX ADMIN — Medication 3 MILLILITER(S): at 03:49

## 2024-02-08 RX ADMIN — LIDOCAINE 1 PATCH: 4 CREAM TOPICAL at 20:24

## 2024-02-08 RX ADMIN — LEVETIRACETAM 400 MILLIGRAM(S): 250 TABLET, FILM COATED ORAL at 05:45

## 2024-02-08 RX ADMIN — Medication 2: at 05:40

## 2024-02-08 RX ADMIN — MIDODRINE HYDROCHLORIDE 10 MILLIGRAM(S): 2.5 TABLET ORAL at 11:58

## 2024-02-08 RX ADMIN — Medication 1 APPLICATION(S): at 22:20

## 2024-02-08 RX ADMIN — Medication 3 MILLILITER(S): at 14:36

## 2024-02-08 RX ADMIN — HEPARIN SODIUM 5000 UNIT(S): 5000 INJECTION INTRAVENOUS; SUBCUTANEOUS at 15:10

## 2024-02-08 RX ADMIN — Medication 81 MILLIGRAM(S): at 12:03

## 2024-02-08 RX ADMIN — PROPOFOL 4.76 MICROGRAM(S)/KG/MIN: 10 INJECTION, EMULSION INTRAVENOUS at 16:12

## 2024-02-08 RX ADMIN — Medication 3 MILLILITER(S): at 10:07

## 2024-02-08 RX ADMIN — CHLORHEXIDINE GLUCONATE 15 MILLILITER(S): 213 SOLUTION TOPICAL at 18:09

## 2024-02-08 RX ADMIN — SODIUM CHLORIDE 4 MILLILITER(S): 9 INJECTION INTRAMUSCULAR; INTRAVENOUS; SUBCUTANEOUS at 10:07

## 2024-02-08 RX ADMIN — Medication 3.72 MICROGRAM(S)/KG/MIN: at 19:45

## 2024-02-08 RX ADMIN — PANTOPRAZOLE SODIUM 40 MILLIGRAM(S): 20 TABLET, DELAYED RELEASE ORAL at 18:12

## 2024-02-08 RX ADMIN — INSULIN GLARGINE 12 UNIT(S): 100 INJECTION, SOLUTION SUBCUTANEOUS at 12:09

## 2024-02-08 RX ADMIN — Medication 1 GRAM(S): at 05:41

## 2024-02-08 RX ADMIN — ESCITALOPRAM OXALATE 10 MILLIGRAM(S): 10 TABLET, FILM COATED ORAL at 11:56

## 2024-02-08 RX ADMIN — Medication 4: at 12:03

## 2024-02-08 RX ADMIN — Medication 3 MILLILITER(S): at 22:36

## 2024-02-08 RX ADMIN — CHLORHEXIDINE GLUCONATE 1 APPLICATION(S): 213 SOLUTION TOPICAL at 12:06

## 2024-02-08 RX ADMIN — Medication 1 DROP(S): at 12:03

## 2024-02-08 NOTE — PROGRESS NOTE ADULT - SUBJECTIVE AND OBJECTIVE BOX
***************************************************************  Candelario (Renee) Shantel PGY2  MICU  ***************************************************************    SHAIK CHARANJIT  89y  MRN: [id number]    Patient is a 89y old  Male who presents with a chief complaint of COVID19, Chest pain (07 Feb 2024 11:28)      Subjective: no events ON. Denies fever, CP, SOB, abn pain, N/V, dysuria. Tolerating diet.      MEDICATIONS  (STANDING):  albuterol/ipratropium for Nebulization 3 milliLiter(s) Nebulizer every 6 hours  artificial  tears Solution 1 Drop(s) Left EYE four times a day  aspirin  chewable 81 milliGRAM(s) Enteral Tube daily  chlorhexidine 0.12% Liquid 15 milliLiter(s) Oral Mucosa every 12 hours  chlorhexidine 2% Cloths 1 Application(s) Topical daily  dexMEDEtomidine Infusion 0.2 MICROgram(s)/kG/Hr (3.97 mL/Hr) IV Continuous <Continuous>  dextrose 5%. 1000 milliLiter(s) (50 mL/Hr) IV Continuous <Continuous>  dextrose 5%. 1000 milliLiter(s) (100 mL/Hr) IV Continuous <Continuous>  dextrose 50% Injectable 25 Gram(s) IV Push once  dextrose 50% Injectable 25 Gram(s) IV Push once  dextrose 50% Injectable 12.5 Gram(s) IV Push once  escitalopram 10 milliGRAM(s) Oral daily  glucagon  Injectable 1 milliGRAM(s) IntraMuscular once  heparin   Injectable 5000 Unit(s) SubCutaneous every 8 hours  insulin glargine Injectable (LANTUS) 12 Unit(s) SubCutaneous <User Schedule>  insulin lispro (ADMELOG) corrective regimen sliding scale   SubCutaneous every 6 hours  levETIRAcetam  IVPB 500 milliGRAM(s) IV Intermittent every 12 hours  lidocaine   4% Patch 1 Patch Transdermal every 24 hours  norepinephrine Infusion 0.05 MICROgram(s)/kG/Min (3.72 mL/Hr) IV Continuous <Continuous>  pantoprazole  Injectable 40 milliGRAM(s) IV Push every 12 hours  petrolatum Ophthalmic Ointment 1 Application(s) Left EYE at bedtime  sodium chloride 3%  Inhalation 4 milliLiter(s) Inhalation two times a day  sucralfate suspension 1 Gram(s) Enteral Tube two times a day    MEDICATIONS  (PRN):  dextrose Oral Gel 15 Gram(s) Oral once PRN Blood Glucose LESS THAN 70 milliGRAM(s)/deciliter      Objective:    Vitals: Vital Signs Last 24 Hrs  T(C): 36.1 (02-08-24 @ 04:00), Max: 37.7 (02-07-24 @ 12:00)  T(F): 97 (02-08-24 @ 04:00), Max: 99.9 (02-08-24 @ 00:00)  HR: 82 (02-08-24 @ 06:00) (76 - 104)  BP: 141/53 (02-08-24 @ 06:00) (83/41 - 141/60)  BP(mean): 76 (02-08-24 @ 06:00) (49 - 80)  RR: 14 (02-08-24 @ 06:00) (12 - 24)  SpO2: 100% (02-08-24 @ 06:00) (91% - 100%)            I&O's Summary    07 Feb 2024 07:01  -  08 Feb 2024 07:00  --------------------------------------------------------  IN: 1880.9 mL / OUT: 2198 mL / NET: -317.1 mL        PHYSICAL EXAM:  GENERAL: NAD  HEAD:  Atraumatic, Normocephalic  EYES: EOMI, conjunctiva and sclera clear  CHEST/LUNG: Clear to auscultation bilaterally; No rales, rhonchi, wheezing, or rubs  HEART: Regular rate and rhythm; No murmurs, rubs, or gallops  ABDOMEN: Soft, Nontender, Nondistended;   SKIN: No rashes or lesions  NERVOUS SYSTEM:  Alert & Oriented X3, no focal deficits    LABS:  02-08    137  |  100  |  31<H>  ----------------------------<  181<H>  4.0   |  29  |  1.58<H>  02-07    139  |  102  |  31<H>  ----------------------------<  181<H>  4.1   |  29  |  1.57<H>  02-06    140  |  103  |  33<H>  ----------------------------<  108<H>  3.9   |  27  |  1.58<H>    Ca    8.1<L>      08 Feb 2024 02:22  Ca    7.9<L>      07 Feb 2024 00:10  Ca    7.7<L>      06 Feb 2024 00:40  Phos  3.1     02-08  Mg     2.10     02-08    TPro  6.3  /  Alb  2.6<L>  /  TBili  0.3  /  DBili  x   /  AST  23  /  ALT  16  /  AlkPhos  105  02-08  TPro  6.2  /  Alb  2.2<L>  /  TBili  0.2  /  DBili  x   /  AST  22  /  ALT  21  /  AlkPhos  87  02-07  TPro  5.9<L>  /  Alb  2.1<L>  /  TBili  0.2  /  DBili  x   /  AST  19  /  ALT  19  /  AlkPhos  93  02-06      PT/INR - ( 08 Feb 2024 02:22 )   PT: 11.4 sec;   INR: 1.02 ratio         PTT - ( 08 Feb 2024 02:22 )  PTT:31.8 sec              Urinalysis Basic - ( 08 Feb 2024 02:22 )    Color: x / Appearance: x / SG: x / pH: x  Gluc: 181 mg/dL / Ketone: x  / Bili: x / Urobili: x   Blood: x / Protein: x / Nitrite: x   Leuk Esterase: x / RBC: x / WBC x   Sq Epi: x / Non Sq Epi: x / Bacteria: x                              7.8    7.15  )-----------( 303      ( 08 Feb 2024 02:22 )             24.9                         7.3    6.96  )-----------( 290      ( 07 Feb 2024 00:10 )             23.7                         7.2    7.33  )-----------( 308      ( 06 Feb 2024 00:40 )             22.6     CAPILLARY BLOOD GLUCOSE      POCT Blood Glucose.: 189 mg/dL (08 Feb 2024 05:39)  POCT Blood Glucose.: 217 mg/dL (07 Feb 2024 23:04)  POCT Blood Glucose.: 178 mg/dL (07 Feb 2024 17:55)  POCT Blood Glucose.: 216 mg/dL (07 Feb 2024 11:24)      RADIOLOGY & ADDITIONAL TESTS:    Imaging Personally Reviewed:  [x ] YES  [ ] NO    Consultants involved in case:   Consultant(s) Notes Reviewed:  [ x] YES  [ ] NO:   Care Discussed with Consultants/Other Providers [x ] YES  [ ] NO         ***************************************************************  Candelario (MunirNavjot) Shantel PGY2  MICU  ***************************************************************    SHAIK CHARANJIT  89y  MRN: [id number]    Patient is a 89y old  Male who presents with a chief complaint of COVID19, Chest pain (07 Feb 2024 11:28)      Subjective: NAEO. Open eyes and will squeeze hands although unclear if truly following commands.     MEDICATIONS  (STANDING):  albuterol/ipratropium for Nebulization 3 milliLiter(s) Nebulizer every 6 hours  artificial  tears Solution 1 Drop(s) Left EYE four times a day  aspirin  chewable 81 milliGRAM(s) Enteral Tube daily  chlorhexidine 0.12% Liquid 15 milliLiter(s) Oral Mucosa every 12 hours  chlorhexidine 2% Cloths 1 Application(s) Topical daily  dexMEDEtomidine Infusion 0.2 MICROgram(s)/kG/Hr (3.97 mL/Hr) IV Continuous <Continuous>  dextrose 5%. 1000 milliLiter(s) (50 mL/Hr) IV Continuous <Continuous>  dextrose 5%. 1000 milliLiter(s) (100 mL/Hr) IV Continuous <Continuous>  dextrose 50% Injectable 25 Gram(s) IV Push once  dextrose 50% Injectable 25 Gram(s) IV Push once  dextrose 50% Injectable 12.5 Gram(s) IV Push once  escitalopram 10 milliGRAM(s) Oral daily  glucagon  Injectable 1 milliGRAM(s) IntraMuscular once  heparin   Injectable 5000 Unit(s) SubCutaneous every 8 hours  insulin glargine Injectable (LANTUS) 12 Unit(s) SubCutaneous <User Schedule>  insulin lispro (ADMELOG) corrective regimen sliding scale   SubCutaneous every 6 hours  levETIRAcetam  IVPB 500 milliGRAM(s) IV Intermittent every 12 hours  lidocaine   4% Patch 1 Patch Transdermal every 24 hours  norepinephrine Infusion 0.05 MICROgram(s)/kG/Min (3.72 mL/Hr) IV Continuous <Continuous>  pantoprazole  Injectable 40 milliGRAM(s) IV Push every 12 hours  petrolatum Ophthalmic Ointment 1 Application(s) Left EYE at bedtime  sodium chloride 3%  Inhalation 4 milliLiter(s) Inhalation two times a day  sucralfate suspension 1 Gram(s) Enteral Tube two times a day    MEDICATIONS  (PRN):  dextrose Oral Gel 15 Gram(s) Oral once PRN Blood Glucose LESS THAN 70 milliGRAM(s)/deciliter      Objective:    Vitals: Vital Signs Last 24 Hrs  T(C): 36.1 (02-08-24 @ 04:00), Max: 37.7 (02-07-24 @ 12:00)  T(F): 97 (02-08-24 @ 04:00), Max: 99.9 (02-08-24 @ 00:00)  HR: 82 (02-08-24 @ 06:00) (76 - 104)  BP: 141/53 (02-08-24 @ 06:00) (83/41 - 141/60)  BP(mean): 76 (02-08-24 @ 06:00) (49 - 80)  RR: 14 (02-08-24 @ 06:00) (12 - 24)  SpO2: 100% (02-08-24 @ 06:00) (91% - 100%)            I&O's Summary    07 Feb 2024 07:01  -  08 Feb 2024 07:00  --------------------------------------------------------  IN: 1880.9 mL / OUT: 2198 mL / NET: -317.1 mL        PHYSICAL EXAM:  GENERAL: NAD  HEAD:  Atraumatic, Normocephalic  EYES: EOMI, conjunctiva and sclera clear  CHEST/LUNG: Clear to auscultation bilaterally; No rales, rhonchi, wheezing, or rubs  HEART: Regular rate and rhythm; No murmurs, rubs, or gallops  ABDOMEN: Soft, Nontender, Nondistended;   SKIN: 1+ to shins bilaterally.   NERVOUS SYSTEM:  Alert & Oriented X0, opens eyes and tracks in room, does not seem to follow commands.     LABS:  02-08    137  |  100  |  31<H>  ----------------------------<  181<H>  4.0   |  29  |  1.58<H>  02-07    139  |  102  |  31<H>  ----------------------------<  181<H>  4.1   |  29  |  1.57<H>  02-06    140  |  103  |  33<H>  ----------------------------<  108<H>  3.9   |  27  |  1.58<H>    Ca    8.1<L>      08 Feb 2024 02:22  Ca    7.9<L>      07 Feb 2024 00:10  Ca    7.7<L>      06 Feb 2024 00:40  Phos  3.1     02-08  Mg     2.10     02-08    TPro  6.3  /  Alb  2.6<L>  /  TBili  0.3  /  DBili  x   /  AST  23  /  ALT  16  /  AlkPhos  105  02-08  TPro  6.2  /  Alb  2.2<L>  /  TBili  0.2  /  DBili  x   /  AST  22  /  ALT  21  /  AlkPhos  87  02-07  TPro  5.9<L>  /  Alb  2.1<L>  /  TBili  0.2  /  DBili  x   /  AST  19  /  ALT  19  /  AlkPhos  93  02-06      PT/INR - ( 08 Feb 2024 02:22 )   PT: 11.4 sec;   INR: 1.02 ratio         PTT - ( 08 Feb 2024 02:22 )  PTT:31.8 sec              Urinalysis Basic - ( 08 Feb 2024 02:22 )    Color: x / Appearance: x / SG: x / pH: x  Gluc: 181 mg/dL / Ketone: x  / Bili: x / Urobili: x   Blood: x / Protein: x / Nitrite: x   Leuk Esterase: x / RBC: x / WBC x   Sq Epi: x / Non Sq Epi: x / Bacteria: x                              7.8    7.15  )-----------( 303      ( 08 Feb 2024 02:22 )             24.9                         7.3    6.96  )-----------( 290      ( 07 Feb 2024 00:10 )             23.7                         7.2    7.33  )-----------( 308      ( 06 Feb 2024 00:40 )             22.6     CAPILLARY BLOOD GLUCOSE      POCT Blood Glucose.: 189 mg/dL (08 Feb 2024 05:39)  POCT Blood Glucose.: 217 mg/dL (07 Feb 2024 23:04)  POCT Blood Glucose.: 178 mg/dL (07 Feb 2024 17:55)  POCT Blood Glucose.: 216 mg/dL (07 Feb 2024 11:24)      RADIOLOGY & ADDITIONAL TESTS:    Imaging Personally Reviewed:  [x ] YES  [ ] NO    Consultants involved in case:   Consultant(s) Notes Reviewed:  [ x] YES  [ ] NO:   Care Discussed with Consultants/Other Providers [x ] YES  [ ] NO

## 2024-02-08 NOTE — PROGRESS NOTE ADULT - SUBJECTIVE AND OBJECTIVE BOX
Aashish Boyer MD  Interventional Cardiology / Advance Heart Failure and Cardiac Transplant Specialist  [place] Office : [phone] [place] [id number]  Tel:   [place] Office : [address] [facility] [address]  Tel: [phone]      Subjective/Overnight events: Pt is lying in bed remains intubated in MICU  	  MEDICATIONS:  aspirin  chewable 81 milliGRAM(s) Enteral Tube daily  heparin   Injectable 5000 Unit(s) SubCutaneous every 8 hours  midodrine 10 milliGRAM(s) Oral every 8 hours  norepinephrine Infusion 0.05 MICROgram(s)/kG/Min IV Continuous <Continuous>      albuterol/ipratropium for Nebulization 3 milliLiter(s) Nebulizer every 6 hours  sodium chloride 3%  Inhalation 4 milliLiter(s) Inhalation two times a day    dexMEDEtomidine Infusion 0.2 MICROgram(s)/kG/Hr IV Continuous <Continuous>  escitalopram 10 milliGRAM(s) Oral daily  levETIRAcetam  IVPB 500 milliGRAM(s) IV Intermittent every 12 hours  midazolam Injectable 2 milliGRAM(s) IV Push once  propofol Infusion 10.004 MICROgram(s)/kG/Min IV Continuous <Continuous>    pantoprazole  Injectable 40 milliGRAM(s) IV Push every 12 hours  sucralfate suspension 1 Gram(s) Enteral Tube two times a day    dextrose 50% Injectable 25 Gram(s) IV Push once  dextrose 50% Injectable 25 Gram(s) IV Push once  dextrose 50% Injectable 12.5 Gram(s) IV Push once  dextrose Oral Gel 15 Gram(s) Oral once PRN  glucagon  Injectable 1 milliGRAM(s) IntraMuscular once  insulin glargine Injectable (LANTUS) 12 Unit(s) SubCutaneous <User Schedule>  insulin lispro (ADMELOG) corrective regimen sliding scale   SubCutaneous every 6 hours    artificial  tears Solution 1 Drop(s) Left EYE four times a day  chlorhexidine 0.12% Liquid 15 milliLiter(s) Oral Mucosa every 12 hours  chlorhexidine 2% Cloths 1 Application(s) Topical daily  dextrose 5%. 1000 milliLiter(s) IV Continuous <Continuous>  dextrose 5%. 1000 milliLiter(s) IV Continuous <Continuous>  lidocaine   4% Patch 1 Patch Transdermal every 24 hours  petrolatum Ophthalmic Ointment 1 Application(s) Left EYE at bedtime      PAST MEDICAL/SURGICAL HISTORY  PAST MEDICAL & SURGICAL HISTORY:  Hyperlipemia      Hypertension      Coronary Artery Disease      Diabetes Mellitus Type II      Stented Coronary Artery  total 5 stents, last stent 5/2019      Neuropathy      Myocardial infarction      Stroke  mild left facial numbness   no other residuals verbalized      Myoclonic jerking      Stage 3 chronic kidney disease      History of Cataract Extraction      Hx of CABG      H/O coronary angiogram      S/P coronary artery stent placement  1/6/09      S/P placement of cardiac pacemaker          SOCIAL HISTORY: Substance Use (street drugs): ( x ) never used  (  ) other:    FAMILY HISTORY:  No pertinent family history in first degree relatives          PHYSICAL EXAM:  T(C): 37.2 (02-08-24 @ 12:00), Max: 37.7 (02-08-24 @ 00:00)  HR: 82 (02-08-24 @ 14:37) (76 - 105)  BP: 98/49 (02-08-24 @ 14:30) (83/41 - 152/62)  RR: 18 (02-08-24 @ 14:30) (12 - 23)  SpO2: 95% (02-08-24 @ 14:37) (91% - 100%)  Wt(kg): --  I&O's Summary    07 Feb 2024 07:01  -  08 Feb 2024 07:00  --------------------------------------------------------  IN: 1880.9 mL / OUT: 2198 mL / NET: -317.1 mL    08 Feb 2024 07:01  -  08 Feb 2024 15:48  --------------------------------------------------------  IN: 479.4 mL / OUT: 255 mL / NET: 224.4 mL          GENERAL: intubated  EYES:  conjunctiva and sclera clear  ENMT: No tonsillar erythema, exudates, or enlargement  Cardiovascular: Normal S1 S2, No JVD, No murmurs, No edema  Respiratory: Lungs clear to auscultation	  Gastrointestinal:  Soft  Extremities: No edema                                     7.8    7.15  )-----------( 303      ( 08 Feb 2024 02:22 )             24.9     02-08    137  |  100  |  31<H>  ----------------------------<  181<H>  4.0   |  29  |  1.58<H>    Ca    8.1<L>      08 Feb 2024 02:22  Phos  3.1     02-08  Mg     2.10     02-08    TPro  6.3  /  Alb  2.6<L>  /  TBili  0.3  /  DBili  x   /  AST  23  /  ALT  16  /  AlkPhos  105  02-08    proBNP:   Lipid Profile:   HgA1c:   TSH:     Consultant(s) Notes Reviewed:  [x ] YES  [ ] NO    Care Discussed with Consultants/Other Providers [ x] YES  [ ] NO    Imaging Personally Reviewed independently:  [x] YES  [ ] NO    All labs, radiologic studies, vitals, orders and medications list reviewed. Patient is seen and examined at bedside. Case discussed with medical team.

## 2024-02-08 NOTE — PROGRESS NOTE ADULT - ASSESSMENT
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) who presents to the hospital for COVID19 infection and chest pain  MABLE - Renal fxn  stable at present   Hypophosphatemia- acceptable  Hypokalemia -  K+ improving  s/p repletion   Hypertensive urgency -resolved -BP acceptable at present  Pulm - resp failure - on bipap   s/p EEG  - eval noted  Hypernatremia - Na+ much improved  on free water s prescribed via NGT    1 Renal - scr stable at present,  no objection to lasix 40mg IV PRN (got dose this a.m.)  continue to trend phos level daily   2 CV - BP acceptable at present  3 Vasc - s/p SMA stent placement;   4 Sx - s/p HIDA + for acute asa, medical management  5 GI - s/p EGD clipping of bleeding duodenal ulcers , plan for PEG pending MRI   6 Anemia- hgb improving, continue to trend H/H; suggest PRBC for Hgb <7.0; transfuse per primary team   7 Pulm -vent as per icu   8- ID -afebrile   9 Glycemic control as able       Elsa Nunez  [facility]  Office: [phone]

## 2024-02-08 NOTE — PROGRESS NOTE ADULT - ASSESSMENT
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 EKG non ischemic , trop -sera   - echo with normal lv and moderate AS   - c/w metoprolol   - chest pains  Trop mildly elevated and trending down likely sec to kidney disease,   - holding brilinta, was on it for SMA stent placed in 12/2023, held 2/2 anticipation for PEG placement, restart when no further invasive procedures planned    3) Covid +  - s/p remdesivir   - c/w supportive management   - t/t per primary team     4) Abd distension   -CTA AP obtained which showed  partially occlusive calcified and non-calcified plaque just distal to take-off of the SMA, with estimated at least 75% luminal narrowing. Limited evaluation of its distal branches. 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   -on Protonix IV q 12

## 2024-02-08 NOTE — PROGRESS NOTE ADULT - ASSESSMENT
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     PLAN  =====Neurologic=====  #CVA  - prior CVA x3 with no residual deficits  #myoclonic jerks  - on Keppra 500 mg BID, per neuro wishing to wean however per family request will continue at 500 mg BID, but now off valproate per neuro    - also per neuro, use precedex as needed for clinical sedation as opposed to propofol   - holding home  gabapentin and memantine in setting of somnolence  - continue lexapro   - CT/CTA negative for stroke  - EEG now off   - MRI brain when stable and off EEG - discussed w/ EP, PPM compatible. Safety screening form done.       =====Pulmonary=====  #COVID  - s/p paxlovid and 1 dose remdesivir while inpatient  #Pleural effusions b/l  - CT chest from 1/16 showing new small bilateral pleural effusions/ atelectasis/ patchy bilateral ground-glass opacities are consistent with pulmonary edema. Repeat XR w/ 2/2 findings c/f edema as well  - s/p zosyn for aspiration PNA from 1/20- 1/28   - POCUS with resolution of b/l pleural effusions, still holding brilinta for possible procedure.  - Discussed w/ family, defer trach/PEG until MRI brain done  - Lasix 40 mg IVP x1 for diuresis      =====Cardiovascular=====  On small dose of pressors, wean as tolerated  #HTN  - on home amlodipine and metoprolol currently holding     #CAD  - on Brilinta (holding) aspirin and ranolazine continue   - as per vascular okay to hold Brilinta for possible pleural effusion thoracentesis  -have not heard back from cardiology regarding Brilinta / last stent 2022 , will hold   - on asa suppository -> To oral as pt w/ oral access now    #Afib  - pt with known history of pAF, first observed 2/1  - can consider starting AC and/or rate control if persists     =====GI=====  #upper GI bleed  - s/p EGD showing dieulafoy lesion and esophagitis likely 2/2 NGT irritation s/p 2 clips  - on protonix IV BID continue   - CT on 1/25 with cholelithiasis and sludge HIDA on 1/26 confirming likely acute choley, s/p IR perc cholecystostomy 1/26   - Discussed w/ family, defer trach/PEG until MRI brain done    #Diet  - tube feeds NGT     =====Renal/=====  #Electrolytes  - Maintain K>4, Phos>3, Mag>2, iCal>1  - baseline cr 1.5, at baseline  - on free water flushes for hyperNa. Trend BMP/Na   - Pt w/ contraction alkalosis due to overdiuresis, CTM, dose diamox to keep net negative    =====Endocrine=====  #DM2  - on lantus 12U and q6 SSI  - a1c 9.3, glucose wnl inpatient     =====Infectious Disease=====  #COVID   - s/p paxlovid and 1 dose remdesivir  # PNA  - CT chest showing ground glass opacities  - s/p zosyn  - intermittent episodes of fevers, likely source GI given choleycystitis? culture NGTD  - meropenem 5d course thru 2/1   - now monitoring off antibiotics    =====Heme/Onc=====  #DVT Ppx  - heparin sub-q    =====Ethics=====  FULL CODE.  After family discussion, amenable to MRI first, depending on severity of findings, will then consider trach/PEG. 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     PLAN  =====Neurologic=====  #CVA  - prior CVA x3 with no residual deficits    #myoclonic jerks  - on Keppra 500 mg BID, per neuro wishing to wean however per family request will continue at 500 mg BID, but now off valproate per neuro    - also per neuro, use precedex as needed for clinical sedation as opposed to propofol   - holding home  gabapentin and memantine in setting of somnolence  - continue lexapro     #AMS  - CT/CTA negative for stroke. EEG now off   - MRI brain - Cardiology form sent to MRI, arranging date and time for patient to go down when [employer] representative is available.       =====Pulmonary=====  #COVID  - s/p paxlovid and 1 dose remdesivir while inpatient  #Pleural effusions b/l  - CT chest from 1/16 showing new small bilateral pleural effusions/ atelectasis/ patchy bilateral ground-glass opacities are consistent with pulmonary edema. Repeat XR w/ 2/2 findings c/f edema as well  - s/p zosyn for aspiration PNA from 1/20- 1/28   - POCUS with resolution of b/l pleural effusions, still holding brilinta for possible procedure.  - Discussed w/ family, defer trach/PEG until MRI brain done  - Lasix 40 mg IVP x1 for diuresis again on 2/8      =====Cardiovascular=====  On small dose of pressors, wean as tolerated  #HTN  - on home amlodipine and metoprolol currently holding     #CAD  - on Brilinta (holding) aspirin and ranolazine continue   - as per vascular okay to hold Brilinta for possible pleural effusion thoracentesis  -have not heard back from cardiology regarding Brilinta / last stent 2022 , will hold   - on asa suppository -> To oral as pt w/ oral access now    #Afib  - pt with known history of pAF, first observed 2/1  - can consider starting AC and/or rate control if persists     =====GI=====  #upper GI bleed  - s/p EGD showing dieulafoy lesion and esophagitis likely 2/2 NGT irritation s/p 2 clips  - on protonix IV BID continue   - CT on 1/25 with cholelithiasis and sludge HIDA on 1/26 confirming likely acute choley, s/p IR perc cholecystostomy 1/26   - Discussed w/ family, defer trach/PEG until MRI brain done    #Diet  - tube feeds NGT     =====Renal/=====  #Electrolytes  - Maintain K>4, Phos>3, Mag>2, iCal>1  - baseline cr 1.5, at baseline  - on free water flushes for hyperNa. Trend BMP/Na     =====Endocrine=====  #DM2  - on lantus 12U and q6 SSI  - a1c 9.3, glucose wnl inpatient     =====Infectious Disease=====  #COVID   - s/p paxlovid and 1 dose remdesivir  # PNA  - CT chest showing ground glass opacities  - s/p zosyn  - intermittent episodes of fevers, likely source GI given choleycystitis? culture NGTD  - meropenem 5d course thru 2/1   - now monitoring off antibiotics    =====Heme/Onc=====  #DVT Ppx  - heparin sub-q    =====Ethics=====  FULL CODE.  After family discussion, amenable to MRI first, depending on severity of findings, will then consider trach/PEG. 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     PLAN  =====Neurologic=====  #CVA  - prior CVA x3 with no residual deficits    #myoclonic jerks  - on Keppra 500 mg BID, per neuro wishing to wean however per family request will continue at 500 mg BID, but now off valproate per neuro    - also per neuro, use precedex as needed for clinical sedation as opposed to propofol   - holding home  gabapentin and memantine in setting of somnolence  - continue lexapro     #AMS  - CT/CTA negative for stroke. EEG now off   - MRI brain - Cardiology form sent to MRI, arranging date and time for patient to go down when [employer] representative is available.       =====Pulmonary=====  #COVID  - s/p paxlovid and 1 dose remdesivir while inpatient  #Pleural effusions b/l  - CT chest from 1/16 showing new small bilateral pleural effusions/ atelectasis/ patchy bilateral ground-glass opacities are consistent with pulmonary edema. Repeat XR w/ 2/2 findings c/f edema as well  - s/p zosyn for aspiration PNA from 1/20- 1/28   - POCUS with resolution of b/l pleural effusions, still holding brilinta for possible procedure.  - Discussed w/ family, defer trach/PEG until MRI brain done  - Lasix 40 mg IVP x1 for diuresis again on 2/8      =====Cardiovascular=====  On small dose of pressors, wean as tolerated - started midodrine 10 mg q8h  #HTN  - on home amlodipine and metoprolol currently holding     #CAD  - on Brilinta (holding) aspirin and ranolazine continue   - as per vascular okay to hold Brilinta for possible pleural effusion thoracentesis  -have not heard back from cardiology regarding Brilinta / last stent 2022 , will hold   - on asa suppository -> To oral as pt w/ oral access now    #Afib  - pt with known history of pAF, first observed 2/1  - can consider starting AC and/or rate control if persists     =====GI=====  #upper GI bleed  - s/p EGD showing dieulafoy lesion and esophagitis likely 2/2 NGT irritation s/p 2 clips  - on protonix IV BID continue   - CT on 1/25 with cholelithiasis and sludge HIDA on 1/26 confirming likely acute choley, s/p IR perc cholecystostomy 1/26   - Discussed w/ family, defer trach/PEG until MRI brain done    #Diet  - tube feeds NGT     =====Renal/=====  #Electrolytes  - Maintain K>4, Phos>3, Mag>2, iCal>1  - baseline cr 1.5, at baseline  - on free water flushes for hyperNa. Trend BMP/Na     =====Endocrine=====  #DM2  - on lantus 12U and q6 SSI  - a1c 9.3, glucose wnl inpatient     =====Infectious Disease=====  #COVID   - s/p paxlovid and 1 dose remdesivir  # PNA  - CT chest showing ground glass opacities  - s/p zosyn  - intermittent episodes of fevers, likely source GI given choleycystitis? culture NGTD  - meropenem 5d course thru 2/1   - now monitoring off antibiotics    =====Heme/Onc=====  #DVT Ppx  - heparin sub-q    =====Ethics=====  FULL CODE.  After family discussion, amenable to MRI first, depending on severity of findings, will then consider trach/PEG.

## 2024-02-08 NOTE — PROGRESS NOTE ADULT - ATTENDING COMMENTS
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 Course c/b acute cholecystitis s/p perc asa 1/26 by IR    Patient failed trial of extubation, also possible sz. More awake. Was pending tracheostomy but family declined and would want an MRI prior to assess for structural abn prior to tracheostomy.     #Acute hypoxemic/hypercapnic respiratory failure  #Multifocal Pneumonia  # Shock on pressors  #Dysphagia  #Acute cholecystitis  #Encephalopathy  #Afib - New, intermittent  #SMA stent   #Petechial hemorrhages.   - Frequent aspiration noted clinically and on CT scans. now reintubated, unable to clear secretions. 2/2 to poor cough and mental status.   - EEG now without sz, neuro following c/w keppra 500. D/C eeg. Pending MRI. Was cleared by EP for MRI.   - S/P course of antibiotics for MSSA pna as well as aspiration. Continue to monitor off abx.   - C/W vent, adjust based on gas, s/p diamox yesterday, will trend based on blood gas. Will diurese PRN  - S/P IR drainage of biliary tube, monitor output.   - new AFib, not on full ac at this time. was In the setting of critical illness, currently being monitored on tele and NSR.  - C/W ASA, Brilinta on hold for possible procedure. Per vascular can hold temporarily. Will restart soon as patient is trached/PEG.  - Seen by optho, continue to monitor  - oropharyngeal dysphagia will need peg given repeated aspiration/ GI reconsulted.   - shock on pressors, wean as tolerated. Likely 2/2 to vasoplegia.   - Was pending trach and PEG but family now awaiting results of the MRI prior to decision.   - DVT ppx - SQH  - Dispo- family agreeable with trach and peg.

## 2024-02-08 NOTE — CHART NOTE - NSCHARTNOTEFT_GEN_A_CORE
: Hattie Mcintosh    INDICATION:     PROCEDURE:  [x] LIMITED ECHO  [x] LIMITED CHEST  [ ] LIMITED RETROPERITONEAL  [ ] LIMITED ABDOMINAL  [ ] LIMITED DVT  [ ] NEEDLE GUIDANCE VASCULAR  [ ] NEEDLE GUIDANCE THORACENTESIS  [ ] NEEDLE GUIDANCE PARACENTESIS  [ ] NEEDLE GUIDANCE PERICARDIOCENTESIS  [ ] OTHER    FINDINGS/INTERPRETATION:    B/l B lines in apical view with mild-moderate effusion in posterior views  Visually normal/hyperdymanic LV function with appropriate outflow, RV function appears normal. Normal VTI in four chamber view. No significant regurgitation seen across mitral, tricuspid and aortic valves in PSL, PSS, apical 4, and subxiphoid view. IVC wnl with inspiratory variation. : Hattie Mcintosh    INDICATION:     PROCEDURE:  [x] LIMITED ECHO  [x] LIMITED CHEST  [ ] LIMITED RETROPERITONEAL  [ ] LIMITED ABDOMINAL  [ ] LIMITED DVT  [ ] NEEDLE GUIDANCE VASCULAR  [ ] NEEDLE GUIDANCE THORACENTESIS  [ ] NEEDLE GUIDANCE PARACENTESIS  [ ] NEEDLE GUIDANCE PERICARDIOCENTESIS  [ ] OTHER    FINDINGS/INTERPRETATION:    B/l B lines in apical view with mild-moderate effusion in posterior views  Visually normal/hyperdymanic LV function with appropriate outflow, RV function appears normal. Normal VTI in four chamber view. No significant regurgitation seen across mitral, tricuspid and aortic valves in PSL, PSS, apical 4, and subxiphoid view. IVC wnl with inspiratory variation.    Attending note :  I was present for the duration of the procedure and supervised as needed.

## 2024-02-08 NOTE — PROGRESS NOTE ADULT - SUBJECTIVE AND OBJECTIVE BOX
NEPHROLOGY     Patient seen and examined with family at bedside, in no acute distress.   intubated   MEDICATIONS  (STANDING):  albuterol/ipratropium for Nebulization 3 milliLiter(s) Nebulizer every 6 hours  artificial  tears Solution 1 Drop(s) Left EYE four times a day  aspirin  chewable 81 milliGRAM(s) Enteral Tube daily  chlorhexidine 0.12% Liquid 15 milliLiter(s) Oral Mucosa every 12 hours  chlorhexidine 2% Cloths 1 Application(s) Topical daily  dexMEDEtomidine Infusion 0.2 MICROgram(s)/kG/Hr (3.97 mL/Hr) IV Continuous <Continuous>  dextrose 5%. 1000 milliLiter(s) (100 mL/Hr) IV Continuous <Continuous>  dextrose 5%. 1000 milliLiter(s) (50 mL/Hr) IV Continuous <Continuous>  dextrose 50% Injectable 25 Gram(s) IV Push once  dextrose 50% Injectable 12.5 Gram(s) IV Push once  dextrose 50% Injectable 25 Gram(s) IV Push once  escitalopram 10 milliGRAM(s) Oral daily  glucagon  Injectable 1 milliGRAM(s) IntraMuscular once  heparin   Injectable 5000 Unit(s) SubCutaneous every 8 hours  insulin glargine Injectable (LANTUS) 12 Unit(s) SubCutaneous <User Schedule>  insulin lispro (ADMELOG) corrective regimen sliding scale   SubCutaneous every 6 hours  levETIRAcetam  IVPB 500 milliGRAM(s) IV Intermittent every 12 hours  lidocaine   4% Patch 1 Patch Transdermal every 24 hours  norepinephrine Infusion 0.05 MICROgram(s)/kG/Min (3.72 mL/Hr) IV Continuous <Continuous>  pantoprazole  Injectable 40 milliGRAM(s) IV Push every 12 hours  petrolatum Ophthalmic Ointment 1 Application(s) Left EYE at bedtime  sodium chloride 3%  Inhalation 4 milliLiter(s) Inhalation two times a day  sucralfate suspension 1 Gram(s) Enteral Tube two times a day    VITALS:  T(C): , Max: 37.3 (02-07-24 @ 04:00)  T(F): , Max: 99.2 (02-07-24 @ 04:00)  HR: 104 (02-07-24 @ 11:00)  BP: 111/72 (02-07-24 @ 11:00)  BP(mean): 80 (02-07-24 @ 11:00)  RR: 21 (02-07-24 @ 11:00)  SpO2: 99% (02-07-24 @ 11:00)  Wt(kg): --  I and O's:    02-06 @ 07:01  -  02-07 @ 07:00  --------------------------------------------------------  IN: 1895.4 mL / OUT: 1315 mL / NET: 580.4 mL    PHYSICAL EXAM:  Constitutional: no distress   HEENT: on bipap  Neck: No LAD, No JVD  Respiratory: diminished b/l  Cardiovascular: S1 and S2  Gastrointestinal: BS+, soft, NT/ND  Extremities: + l/e edema  Neurological: UTO  : + Moss  Skin: No rashes     LABS:                        7.3    6.96  )-----------( 290      ( 07 Feb 2024 00:10 )             23.7     02-07    139  |  102  |  31<H>  ----------------------------<  181<H>  4.1   |  29  |  1.57<H>    Ca    7.9<L>      07 Feb 2024 00:10  Phos  3.8     02-07  Mg     1.90     02-07    TPro  6.2  /  Alb  2.2<L>  /  TBili  0.2  /  DBili  x   /  AST  22  /  ALT  21  /  AlkPhos  87  02-07

## 2024-02-09 LAB
ALBUMIN SERPL ELPH-MCNC: 2.3 G/DL — LOW (ref 3.3–5)
ALP SERPL-CCNC: 87 U/L — SIGNIFICANT CHANGE UP (ref 40–120)
ALT FLD-CCNC: 21 U/L — SIGNIFICANT CHANGE UP (ref 4–41)
ANION GAP SERPL CALC-SCNC: 11 MMOL/L — SIGNIFICANT CHANGE UP (ref 7–14)
APTT BLD: 36 SEC — HIGH (ref 24.5–35.6)
AST SERPL-CCNC: 38 U/L — SIGNIFICANT CHANGE UP (ref 4–40)
BILIRUB SERPL-MCNC: 0.3 MG/DL — SIGNIFICANT CHANGE UP (ref 0.2–1.2)
BLOOD GAS ARTERIAL COMPREHENSIVE RESULT: SIGNIFICANT CHANGE UP
BUN SERPL-MCNC: 30 MG/DL — HIGH (ref 7–23)
CALCIUM SERPL-MCNC: 8.2 MG/DL — LOW (ref 8.4–10.5)
CHLORIDE SERPL-SCNC: 99 MMOL/L — SIGNIFICANT CHANGE UP (ref 98–107)
CO2 SERPL-SCNC: 27 MMOL/L — SIGNIFICANT CHANGE UP (ref 22–31)
CREAT SERPL-MCNC: 1.44 MG/DL — HIGH (ref 0.5–1.3)
EGFR: 46 ML/MIN/1.73M2 — LOW
GLUCOSE BLDC GLUCOMTR-MCNC: 155 MG/DL — HIGH (ref 70–99)
GLUCOSE BLDC GLUCOMTR-MCNC: 227 MG/DL — HIGH (ref 70–99)
GLUCOSE BLDC GLUCOMTR-MCNC: 237 MG/DL — HIGH (ref 70–99)
GLUCOSE BLDC GLUCOMTR-MCNC: 258 MG/DL — HIGH (ref 70–99)
GLUCOSE BLDC GLUCOMTR-MCNC: 293 MG/DL — HIGH (ref 70–99)
GLUCOSE SERPL-MCNC: 170 MG/DL — HIGH (ref 70–99)
HCT VFR BLD CALC: 23.5 % — LOW (ref 39–50)
HGB BLD-MCNC: 7.3 G/DL — LOW (ref 13–17)
INR BLD: 1.04 RATIO — SIGNIFICANT CHANGE UP (ref 0.85–1.18)
MAGNESIUM SERPL-MCNC: 2 MG/DL — SIGNIFICANT CHANGE UP (ref 1.6–2.6)
MCHC RBC-ENTMCNC: 29 PG — SIGNIFICANT CHANGE UP (ref 27–34)
MCHC RBC-ENTMCNC: 31.1 GM/DL — LOW (ref 32–36)
MCV RBC AUTO: 93.3 FL — SIGNIFICANT CHANGE UP (ref 80–100)
NRBC # BLD: 0 /100 WBCS — SIGNIFICANT CHANGE UP (ref 0–0)
NRBC # FLD: 0 K/UL — SIGNIFICANT CHANGE UP (ref 0–0)
PHOSPHATE SERPL-MCNC: 3.3 MG/DL — SIGNIFICANT CHANGE UP (ref 2.5–4.5)
PLATELET # BLD AUTO: 262 K/UL — SIGNIFICANT CHANGE UP (ref 150–400)
POTASSIUM SERPL-MCNC: 3.8 MMOL/L — SIGNIFICANT CHANGE UP (ref 3.5–5.3)
POTASSIUM SERPL-SCNC: 3.8 MMOL/L — SIGNIFICANT CHANGE UP (ref 3.5–5.3)
PROT SERPL-MCNC: 6.2 G/DL — SIGNIFICANT CHANGE UP (ref 6–8.3)
PROTHROM AB SERPL-ACNC: 11.6 SEC — SIGNIFICANT CHANGE UP (ref 9.5–13)
RBC # BLD: 2.52 M/UL — LOW (ref 4.2–5.8)
RBC # FLD: 17.3 % — HIGH (ref 10.3–14.5)
SODIUM SERPL-SCNC: 137 MMOL/L — SIGNIFICANT CHANGE UP (ref 135–145)
WBC # BLD: 7.03 K/UL — SIGNIFICANT CHANGE UP (ref 3.8–10.5)
WBC # FLD AUTO: 7.03 K/UL — SIGNIFICANT CHANGE UP (ref 3.8–10.5)

## 2024-02-09 PROCEDURE — 99232 SBSQ HOSP IP/OBS MODERATE 35: CPT

## 2024-02-09 PROCEDURE — 99291 CRITICAL CARE FIRST HOUR: CPT

## 2024-02-09 PROCEDURE — 71045 X-RAY EXAM CHEST 1 VIEW: CPT | Mod: 26

## 2024-02-09 RX ORDER — POTASSIUM CHLORIDE 20 MEQ
40 PACKET (EA) ORAL ONCE
Refills: 0 | Status: COMPLETED | OUTPATIENT
Start: 2024-02-09 | End: 2024-02-09

## 2024-02-09 RX ORDER — DEXMEDETOMIDINE HYDROCHLORIDE IN 0.9% SODIUM CHLORIDE 4 UG/ML
0.2 INJECTION INTRAVENOUS
Qty: 400 | Refills: 0 | Status: DISCONTINUED | OUTPATIENT
Start: 2024-02-09 | End: 2024-02-11

## 2024-02-09 RX ORDER — MIDODRINE HYDROCHLORIDE 2.5 MG/1
10 TABLET ORAL EVERY 8 HOURS
Refills: 0 | Status: DISCONTINUED | OUTPATIENT
Start: 2024-02-09 | End: 2024-02-11

## 2024-02-09 RX ORDER — FUROSEMIDE 40 MG
20 TABLET ORAL ONCE
Refills: 0 | Status: COMPLETED | OUTPATIENT
Start: 2024-02-09 | End: 2024-02-09

## 2024-02-09 RX ADMIN — Medication 81 MILLIGRAM(S): at 12:39

## 2024-02-09 RX ADMIN — Medication 3 MILLILITER(S): at 20:35

## 2024-02-09 RX ADMIN — PANTOPRAZOLE SODIUM 40 MILLIGRAM(S): 20 TABLET, DELAYED RELEASE ORAL at 18:56

## 2024-02-09 RX ADMIN — HEPARIN SODIUM 5000 UNIT(S): 5000 INJECTION INTRAVENOUS; SUBCUTANEOUS at 23:05

## 2024-02-09 RX ADMIN — HEPARIN SODIUM 5000 UNIT(S): 5000 INJECTION INTRAVENOUS; SUBCUTANEOUS at 06:10

## 2024-02-09 RX ADMIN — Medication 2: at 00:15

## 2024-02-09 RX ADMIN — ESCITALOPRAM OXALATE 10 MILLIGRAM(S): 10 TABLET, FILM COATED ORAL at 12:39

## 2024-02-09 RX ADMIN — Medication 1 APPLICATION(S): at 23:06

## 2024-02-09 RX ADMIN — Medication 4: at 06:14

## 2024-02-09 RX ADMIN — CHLORHEXIDINE GLUCONATE 1 APPLICATION(S): 213 SOLUTION TOPICAL at 12:42

## 2024-02-09 RX ADMIN — Medication 1 DROP(S): at 12:39

## 2024-02-09 RX ADMIN — Medication 3 MILLILITER(S): at 03:37

## 2024-02-09 RX ADMIN — Medication 20 MILLIGRAM(S): at 10:27

## 2024-02-09 RX ADMIN — Medication 3 MILLILITER(S): at 15:31

## 2024-02-09 RX ADMIN — SODIUM CHLORIDE 4 MILLILITER(S): 9 INJECTION INTRAMUSCULAR; INTRAVENOUS; SUBCUTANEOUS at 08:00

## 2024-02-09 RX ADMIN — Medication 1 GRAM(S): at 18:56

## 2024-02-09 RX ADMIN — Medication 1 GRAM(S): at 06:08

## 2024-02-09 RX ADMIN — Medication 1 DROP(S): at 23:06

## 2024-02-09 RX ADMIN — LIDOCAINE 1 PATCH: 4 CREAM TOPICAL at 07:20

## 2024-02-09 RX ADMIN — LEVETIRACETAM 400 MILLIGRAM(S): 250 TABLET, FILM COATED ORAL at 18:56

## 2024-02-09 RX ADMIN — Medication 1 DROP(S): at 18:02

## 2024-02-09 RX ADMIN — CHLORHEXIDINE GLUCONATE 15 MILLILITER(S): 213 SOLUTION TOPICAL at 06:01

## 2024-02-09 RX ADMIN — CHLORHEXIDINE GLUCONATE 15 MILLILITER(S): 213 SOLUTION TOPICAL at 18:03

## 2024-02-09 RX ADMIN — LEVETIRACETAM 400 MILLIGRAM(S): 250 TABLET, FILM COATED ORAL at 06:01

## 2024-02-09 RX ADMIN — Medication 6: at 12:38

## 2024-02-09 RX ADMIN — Medication 1 DROP(S): at 06:13

## 2024-02-09 RX ADMIN — DEXMEDETOMIDINE HYDROCHLORIDE IN 0.9% SODIUM CHLORIDE 3.97 MICROGRAM(S)/KG/HR: 4 INJECTION INTRAVENOUS at 10:28

## 2024-02-09 RX ADMIN — Medication 40 MILLIEQUIVALENT(S): at 10:27

## 2024-02-09 RX ADMIN — Medication 4: at 19:00

## 2024-02-09 RX ADMIN — PANTOPRAZOLE SODIUM 40 MILLIGRAM(S): 20 TABLET, DELAYED RELEASE ORAL at 06:05

## 2024-02-09 RX ADMIN — LIDOCAINE 1 PATCH: 4 CREAM TOPICAL at 23:01

## 2024-02-09 RX ADMIN — LIDOCAINE 1 PATCH: 4 CREAM TOPICAL at 08:15

## 2024-02-09 RX ADMIN — SODIUM CHLORIDE 4 MILLILITER(S): 9 INJECTION INTRAMUSCULAR; INTRAVENOUS; SUBCUTANEOUS at 20:36

## 2024-02-09 RX ADMIN — INSULIN GLARGINE 12 UNIT(S): 100 INJECTION, SOLUTION SUBCUTANEOUS at 12:38

## 2024-02-09 RX ADMIN — Medication 1 DROP(S): at 00:16

## 2024-02-09 RX ADMIN — Medication 3 MILLILITER(S): at 08:00

## 2024-02-09 RX ADMIN — HEPARIN SODIUM 5000 UNIT(S): 5000 INJECTION INTRAVENOUS; SUBCUTANEOUS at 14:31

## 2024-02-09 NOTE — PROGRESS NOTE ADULT - SUBJECTIVE AND OBJECTIVE BOX
***************************************************************  Candelario (Renee) Shantel PGY2  MICU  ***************************************************************    SHAIK CHARANJIT  89y  MRN: [id number]    Patient is a 89y old  Male who presents with a chief complaint of COVID19, Chest pain (08 Feb 2024 15:48)      Subjective: no events ON. Denies fever, CP, SOB, abn pain, N/V, dysuria. Tolerating diet.      MEDICATIONS  (STANDING):  albuterol/ipratropium for Nebulization 3 milliLiter(s) Nebulizer every 6 hours  artificial  tears Solution 1 Drop(s) Left EYE four times a day  aspirin  chewable 81 milliGRAM(s) Enteral Tube daily  chlorhexidine 0.12% Liquid 15 milliLiter(s) Oral Mucosa every 12 hours  chlorhexidine 2% Cloths 1 Application(s) Topical daily  dexMEDEtomidine Infusion 0.2 MICROgram(s)/kG/Hr (3.97 mL/Hr) IV Continuous <Continuous>  dextrose 5%. 1000 milliLiter(s) (100 mL/Hr) IV Continuous <Continuous>  dextrose 5%. 1000 milliLiter(s) (50 mL/Hr) IV Continuous <Continuous>  dextrose 50% Injectable 25 Gram(s) IV Push once  dextrose 50% Injectable 25 Gram(s) IV Push once  dextrose 50% Injectable 12.5 Gram(s) IV Push once  escitalopram 10 milliGRAM(s) Oral daily  glucagon  Injectable 1 milliGRAM(s) IntraMuscular once  heparin   Injectable 5000 Unit(s) SubCutaneous every 8 hours  insulin glargine Injectable (LANTUS) 12 Unit(s) SubCutaneous <User Schedule>  insulin lispro (ADMELOG) corrective regimen sliding scale   SubCutaneous every 6 hours  levETIRAcetam  IVPB 500 milliGRAM(s) IV Intermittent every 12 hours  lidocaine   4% Patch 1 Patch Transdermal every 24 hours  midazolam Injectable 2 milliGRAM(s) IV Push once  midodrine 10 milliGRAM(s) Oral every 8 hours  pantoprazole  Injectable 40 milliGRAM(s) IV Push every 12 hours  petrolatum Ophthalmic Ointment 1 Application(s) Left EYE at bedtime  sodium chloride 3%  Inhalation 4 milliLiter(s) Inhalation two times a day  sucralfate suspension 1 Gram(s) Enteral Tube two times a day    MEDICATIONS  (PRN):  dextrose Oral Gel 15 Gram(s) Oral once PRN Blood Glucose LESS THAN 70 milliGRAM(s)/deciliter      Objective:    Vitals: Vital Signs Last 24 Hrs  T(C): 36.3 (02-09-24 @ 04:00), Max: 37.6 (02-08-24 @ 08:00)  T(F): 97.3 (02-09-24 @ 04:00), Max: 99.7 (02-08-24 @ 08:00)  HR: 69 (02-09-24 @ 06:40) (68 - 105)  BP: 153/70 (02-09-24 @ 06:00) (98/49 - 171/74)  BP(mean): 91 (02-09-24 @ 06:00) (54 - 99)  RR: 12 (02-09-24 @ 06:00) (12 - 23)  SpO2: 98% (02-09-24 @ 06:40) (93% - 100%)            I&O's Summary    08 Feb 2024 07:01  -  09 Feb 2024 07:00  --------------------------------------------------------  IN: 1942.1 mL / OUT: 2320 mL / NET: -377.9 mL        PHYSICAL EXAM:  GENERAL: NAD  HEAD:  Atraumatic, Normocephalic  EYES: EOMI, conjunctiva and sclera clear  CHEST/LUNG: Clear to auscultation bilaterally; No rales, rhonchi, wheezing, or rubs  HEART: Regular rate and rhythm; No murmurs, rubs, or gallops  ABDOMEN: Soft, Nontender, Nondistended;   SKIN: No rashes or lesions  NERVOUS SYSTEM:  Alert & Oriented X3, no focal deficits    LABS:  02-09    137  |  99  |  30<H>  ----------------------------<  170<H>  3.8   |  27  |  1.44<H>  02-08    137  |  100  |  31<H>  ----------------------------<  181<H>  4.0   |  29  |  1.58<H>  02-07    139  |  102  |  31<H>  ----------------------------<  181<H>  4.1   |  29  |  1.57<H>    Ca    8.2<L>      09 Feb 2024 00:45  Ca    8.1<L>      08 Feb 2024 02:22  Ca    7.9<L>      07 Feb 2024 00:10  Phos  3.3     02-09  Mg     2.00     02-09    TPro  6.2  /  Alb  2.3<L>  /  TBili  0.3  /  DBili  x   /  AST  38  /  ALT  21  /  AlkPhos  87  02-09  TPro  6.3  /  Alb  2.6<L>  /  TBili  0.3  /  DBili  x   /  AST  23  /  ALT  16  /  AlkPhos  105  02-08  TPro  6.2  /  Alb  2.2<L>  /  TBili  0.2  /  DBili  x   /  AST  22  /  ALT  21  /  AlkPhos  87  02-07      PT/INR - ( 09 Feb 2024 00:45 )   PT: 11.6 sec;   INR: 1.04 ratio         PTT - ( 09 Feb 2024 00:45 )  PTT:36.0 sec              Urinalysis Basic - ( 09 Feb 2024 00:45 )    Color: x / Appearance: x / SG: x / pH: x  Gluc: 170 mg/dL / Ketone: x  / Bili: x / Urobili: x   Blood: x / Protein: x / Nitrite: x   Leuk Esterase: x / RBC: x / WBC x   Sq Epi: x / Non Sq Epi: x / Bacteria: x      ABG - ( 09 Feb 2024 00:45 )  pH, Arterial: 7.44  pH, Blood: x     /  pCO2: 47    /  pO2: 103   / HCO3: 32    / Base Excess: 7.0   /  SaO2: 97.8                                    7.3    7.03  )-----------( 262      ( 09 Feb 2024 00:45 )             23.5                         7.8    7.15  )-----------( 303      ( 08 Feb 2024 02:22 )             24.9                         7.3    6.96  )-----------( 290      ( 07 Feb 2024 00:10 )             23.7     CAPILLARY BLOOD GLUCOSE      POCT Blood Glucose.: 237 mg/dL (09 Feb 2024 05:59)  POCT Blood Glucose.: 155 mg/dL (09 Feb 2024 00:13)  POCT Blood Glucose.: 173 mg/dL (08 Feb 2024 17:54)  POCT Blood Glucose.: 221 mg/dL (08 Feb 2024 11:30)      RADIOLOGY & ADDITIONAL TESTS:    Imaging Personally Reviewed:  [x ] YES  [ ] NO    Consultants involved in case:   Consultant(s) Notes Reviewed:  [ x] YES  [ ] NO:   Care Discussed with Consultants/Other Providers [x ] YES  [ ] NO         ***************************************************************  Candelario (MunirNavjot) Shantel PGY2  MICU  ***************************************************************    SHAIK CHARANJIT  89y  MRN: [id number]    Patient is a 89y old  Male who presents with a chief complaint of COVID19, Chest pain (08 Feb 2024 15:48)      Subjective: Received MRI with no notable findings the day prior. Currently unchanged, opens eyes, looks around room, unclear if following commands.     MEDICATIONS  (STANDING):  albuterol/ipratropium for Nebulization 3 milliLiter(s) Nebulizer every 6 hours  artificial  tears Solution 1 Drop(s) Left EYE four times a day  aspirin  chewable 81 milliGRAM(s) Enteral Tube daily  chlorhexidine 0.12% Liquid 15 milliLiter(s) Oral Mucosa every 12 hours  chlorhexidine 2% Cloths 1 Application(s) Topical daily  dexMEDEtomidine Infusion 0.2 MICROgram(s)/kG/Hr (3.97 mL/Hr) IV Continuous <Continuous>  dextrose 5%. 1000 milliLiter(s) (100 mL/Hr) IV Continuous <Continuous>  dextrose 5%. 1000 milliLiter(s) (50 mL/Hr) IV Continuous <Continuous>  dextrose 50% Injectable 25 Gram(s) IV Push once  dextrose 50% Injectable 25 Gram(s) IV Push once  dextrose 50% Injectable 12.5 Gram(s) IV Push once  escitalopram 10 milliGRAM(s) Oral daily  glucagon  Injectable 1 milliGRAM(s) IntraMuscular once  heparin   Injectable 5000 Unit(s) SubCutaneous every 8 hours  insulin glargine Injectable (LANTUS) 12 Unit(s) SubCutaneous <User Schedule>  insulin lispro (ADMELOG) corrective regimen sliding scale   SubCutaneous every 6 hours  levETIRAcetam  IVPB 500 milliGRAM(s) IV Intermittent every 12 hours  lidocaine   4% Patch 1 Patch Transdermal every 24 hours  midazolam Injectable 2 milliGRAM(s) IV Push once  midodrine 10 milliGRAM(s) Oral every 8 hours  pantoprazole  Injectable 40 milliGRAM(s) IV Push every 12 hours  petrolatum Ophthalmic Ointment 1 Application(s) Left EYE at bedtime  sodium chloride 3%  Inhalation 4 milliLiter(s) Inhalation two times a day  sucralfate suspension 1 Gram(s) Enteral Tube two times a day    MEDICATIONS  (PRN):  dextrose Oral Gel 15 Gram(s) Oral once PRN Blood Glucose LESS THAN 70 milliGRAM(s)/deciliter      Objective:    Vitals: Vital Signs Last 24 Hrs  T(C): 36.3 (02-09-24 @ 04:00), Max: 37.6 (02-08-24 @ 08:00)  T(F): 97.3 (02-09-24 @ 04:00), Max: 99.7 (02-08-24 @ 08:00)  HR: 69 (02-09-24 @ 06:40) (68 - 105)  BP: 153/70 (02-09-24 @ 06:00) (98/49 - 171/74)  BP(mean): 91 (02-09-24 @ 06:00) (54 - 99)  RR: 12 (02-09-24 @ 06:00) (12 - 23)  SpO2: 98% (02-09-24 @ 06:40) (93% - 100%)            I&O's Summary    08 Feb 2024 07:01  -  09 Feb 2024 07:00  --------------------------------------------------------  IN: 1942.1 mL / OUT: 2320 mL / NET: -377.9 mL        PHYSICAL EXAM:  GENERAL: NAD  HEAD:  Atraumatic, Normocephalic  EYES: EOMI, conjunctiva and sclera clear  CHEST/LUNG: Ventilator sounds  HEART: Regular rate and rhythm; No murmurs, rubs, or gallops  ABDOMEN: Soft, Nontender, Nondistended;   SKIN: No rashes or lesions, 1+ edema to b/l shins  NERVOUS SYSTEM: Alert & Oriented X0, opens eyes and looks around room    LABS:  02-09    137  |  99  |  30<H>  ----------------------------<  170<H>  3.8   |  27  |  1.44<H>  02-08    137  |  100  |  31<H>  ----------------------------<  181<H>  4.0   |  29  |  1.58<H>  02-07    139  |  102  |  31<H>  ----------------------------<  181<H>  4.1   |  29  |  1.57<H>    Ca    8.2<L>      09 Feb 2024 00:45  Ca    8.1<L>      08 Feb 2024 02:22  Ca    7.9<L>      07 Feb 2024 00:10  Phos  3.3     02-09  Mg     2.00     02-09    TPro  6.2  /  Alb  2.3<L>  /  TBili  0.3  /  DBili  x   /  AST  38  /  ALT  21  /  AlkPhos  87  02-09  TPro  6.3  /  Alb  2.6<L>  /  TBili  0.3  /  DBili  x   /  AST  23  /  ALT  16  /  AlkPhos  105  02-08  TPro  6.2  /  Alb  2.2<L>  /  TBili  0.2  /  DBili  x   /  AST  22  /  ALT  21  /  AlkPhos  87  02-07      PT/INR - ( 09 Feb 2024 00:45 )   PT: 11.6 sec;   INR: 1.04 ratio         PTT - ( 09 Feb 2024 00:45 )  PTT:36.0 sec              Urinalysis Basic - ( 09 Feb 2024 00:45 )    Color: x / Appearance: x / SG: x / pH: x  Gluc: 170 mg/dL / Ketone: x  / Bili: x / Urobili: x   Blood: x / Protein: x / Nitrite: x   Leuk Esterase: x / RBC: x / WBC x   Sq Epi: x / Non Sq Epi: x / Bacteria: x      ABG - ( 09 Feb 2024 00:45 )  pH, Arterial: 7.44  pH, Blood: x     /  pCO2: 47    /  pO2: 103   / HCO3: 32    / Base Excess: 7.0   /  SaO2: 97.8                                    7.3    7.03  )-----------( 262      ( 09 Feb 2024 00:45 )             23.5                         7.8    7.15  )-----------( 303      ( 08 Feb 2024 02:22 )             24.9                         7.3    6.96  )-----------( 290      ( 07 Feb 2024 00:10 )             23.7     CAPILLARY BLOOD GLUCOSE      POCT Blood Glucose.: 237 mg/dL (09 Feb 2024 05:59)  POCT Blood Glucose.: 155 mg/dL (09 Feb 2024 00:13)  POCT Blood Glucose.: 173 mg/dL (08 Feb 2024 17:54)  POCT Blood Glucose.: 221 mg/dL (08 Feb 2024 11:30)      RADIOLOGY & ADDITIONAL TESTS:    Imaging Personally Reviewed:  [x ] YES  [ ] NO    Consultants involved in case:   Consultant(s) Notes Reviewed:  [ x] YES  [ ] NO:   Care Discussed with Consultants/Other Providers [x ] YES  [ ] NO

## 2024-02-09 NOTE — PROGRESS NOTE ADULT - SUBJECTIVE AND OBJECTIVE BOX
Aashish Boyer MD  Interventional Cardiology / Advance Heart Failure and Cardiac Transplant Specialist  [place] Office : [phone] [place] [address] [place] [address]  Tel:   [place] Office : [phone] [facility] [place] [address]  Tel: [phone]       Pt is lying in bed intubated  	  MEDICATIONS:  aspirin  chewable 81 milliGRAM(s) Enteral Tube daily  heparin   Injectable 5000 Unit(s) SubCutaneous every 8 hours  midodrine 10 milliGRAM(s) Oral every 8 hours  albuterol/ipratropium for Nebulization 3 milliLiter(s) Nebulizer every 6 hours  sodium chloride 3%  Inhalation 4 milliLiter(s) Inhalation two times a day  dexMEDEtomidine Infusion 0.2 MICROgram(s)/kG/Hr IV Continuous <Continuous>  escitalopram 10 milliGRAM(s) Oral daily  levETIRAcetam  IVPB 500 milliGRAM(s) IV Intermittent every 12 hours  pantoprazole  Injectable 40 milliGRAM(s) IV Push every 12 hours  sucralfate suspension 1 Gram(s) Enteral Tube two times a day  dextrose 50% Injectable 25 Gram(s) IV Push once  dextrose 50% Injectable 12.5 Gram(s) IV Push once  dextrose 50% Injectable 25 Gram(s) IV Push once  dextrose Oral Gel 15 Gram(s) Oral once PRN  glucagon  Injectable 1 milliGRAM(s) IntraMuscular once  insulin glargine Injectable (LANTUS) 12 Unit(s) SubCutaneous <User Schedule>  insulin lispro (ADMELOG) corrective regimen sliding scale   SubCutaneous every 6 hours  artificial  tears Solution 1 Drop(s) Left EYE four times a day  chlorhexidine 0.12% Liquid 15 milliLiter(s) Oral Mucosa every 12 hours  chlorhexidine 2% Cloths 1 Application(s) Topical daily  dextrose 5%. 1000 milliLiter(s) IV Continuous <Continuous>  dextrose 5%. 1000 milliLiter(s) IV Continuous <Continuous>  lidocaine   4% Patch 1 Patch Transdermal every 24 hours  petrolatum Ophthalmic Ointment 1 Application(s) Left EYE at bedtime      PAST MEDICAL/SURGICAL HISTORY  PAST MEDICAL & SURGICAL HISTORY:  Hyperlipemia      Hypertension      Coronary Artery Disease      Diabetes Mellitus Type II      Stented Coronary Artery  total 5 stents, last stent 5/2019      Neuropathy      Myocardial infarction      Stroke  mild left facial numbness   no other residuals verbalized      Myoclonic jerking      Stage 3 chronic kidney disease      History of Cataract Extraction      Hx of CABG      H/O coronary angiogram      S/P coronary artery stent placement  1/6/09      S/P placement of cardiac pacemaker          SOCIAL HISTORY: Substance Use (street drugs): ( x ) never used  (  ) other:    FAMILY HISTORY:  No pertinent family history in first degree relatives         PHYSICAL EXAM:  T(C): 36 (02-09-24 @ 08:00), Max: 37.4 (02-08-24 @ 16:00)  HR: 74 (02-09-24 @ 10:53) (67 - 102)  BP: 150/59 (02-09-24 @ 09:00) (98/49 - 171/74)  RR: 13 (02-09-24 @ 09:00) (12 - 23)  SpO2: 97% (02-09-24 @ 10:53) (93% - 100%)  Wt(kg): --  I&O's Summary    08 Feb 2024 07:01  -  09 Feb 2024 07:00  --------------------------------------------------------  IN: 1992 mL / OUT: 2360 mL / NET: -368 mL    09 Feb 2024 07:01  -  09 Feb 2024 11:58  --------------------------------------------------------  IN: 349.8 mL / OUT: 80 mL / NET: 269.8 mL          GENERAL: intubated  EYES:   PERRLA   ENMT:   Moist mucous membranes, Good dentition, No lesions  Cardiovascular: Normal S1 S2, No JVD, No murmurs, No edema  Respiratory: b/l rhonchi   Gastrointestinal:  Soft, Non-tender, + BS	  Extremities: no edema                                    7.3    7.03  )-----------( 262      ( 09 Feb 2024 00:45 )             23.5     02-09    137  |  99  |  30<H>  ----------------------------<  170<H>  3.8   |  27  |  1.44<H>    Ca    8.2<L>      09 Feb 2024 00:45  Phos  3.3     02-09  Mg     2.00     02-09    TPro  6.2  /  Alb  2.3<L>  /  TBili  0.3  /  DBili  x   /  AST  38  /  ALT  21  /  AlkPhos  87  02-09    proBNP:   Lipid Profile:   HgA1c:   TSH:     Consultant(s) Notes Reviewed:  [x ] YES  [ ] NO    Care Discussed with Consultants/Other Providers [ x] YES  [ ] NO    Imaging Personally Reviewed independently:  [x] YES  [ ] NO    All labs, radiologic studies, vitals, orders and medications list reviewed. Patient is seen and examined at bedside. Case discussed with medical team.

## 2024-02-09 NOTE — PROGRESS NOTE ADULT - ASSESSMENT
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) who presents to the hospital for COVID19 infection and chest pain  MABLE - Renal fxn  stable at present   Hypophosphatemia- acceptable  Hypokalemia -  K+ improving  s/p repletion   Hypertensive urgency -resolved -BP acceptable at present  Pulm - resp failure - on bipap   s/p EEG  - eval noted  Hypernatremia - Na+ much improved  on free water s prescribed via NGT    1 Renal - scr stable at present,  no objection to lasix 40mg IV PRN (got dose this a.m.)  continue to trend phos level daily   cont with free water flushes   2 CV - BP acceptable at present  3 Vasc - s/p SMA stent placement;   4 Sx - s/p HIDA + for acute asa, medical management  5 GI - s/p EGD clipping of bleeding duodenal ulcers , plan for PEG pending MRI   6 Anemia- , continue to trend H/H; suggest PRBC for Hgb <7.0; transfuse per primary team   7 Pulm -vent as per icu   8- ID -afebrile   9 Glycemic control as able       Elsa Nunez  [facility]  Office: [phone]   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) who presents to the hospital for COVID19 infection and chest pain  MBALE - Renal fxn  stable at present   Hypophosphatemia- acceptable  Hypokalemia -  K+ improving  s/p repletion   Hypertensive urgency -resolved -BP acceptable at present  Pulm - resp failure - on bipap   s/p EEG  - eval noted  Hypernatremia - Na+ much improved  on free water s prescribed via NGT    1 Renal - scr stable at present,  no objection to lasix 40mg IV PRN (got dose this a.m.)  continue to trend phos level daily   cont with free water flushes 250 mlx q4h  2 CV - BP acceptable at present  3 Vasc - s/p SMA stent placement;   4 Sx - s/p HIDA + for acute asa, medical management  5 GI - s/p EGD clipping of bleeding duodenal ulcers , PEG likely coming week   6 Anemia- , continue to trend H/H; suggest PRBC for Hgb <7.0; transfuse per primary team   7 Pulm -vent as per icu , trach pending  8- ID -afebrile   9 Glycemic control as able       Elsa Nunez  [facility]  Office: [phone]

## 2024-02-09 NOTE — PROGRESS NOTE ADULT - ATTENDING COMMENTS
Patient seen and examined at bedside. During follow up visit, son was present. I appreciate that.     Detailed neuro exam:  ROS: Unable to obtain due to the patient is currently orally intubated.  Please note, neuro exam is very limited due to the patient on mechanical ventilation via orally intubated.  General: Patient is comfortably lying on bed without any acute distress.  Mental status: On verbal command, patient unable to follow any simple or complex commands.  Cranial exams: Brainstem reflexes are intact cornea, conjunctiva and gag reflexes. Face looks symmetric. Pupils are symmetric, reactive to light bilaterally.  Power: On noxious stimuli the patient had 4/5 bilateral four extremities.  Sensation: On noxious stimuli patient grimace at all four extremities.  Coordination and gait: Patient did not participate due to the mental status.     Impression and plan:   Mr. 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   Neurology consulted because of altered mental status. Etiology of altered mental status is uncertain but most likely the metabolic encephalopathy  versus seizure etiology. Vascular etiology rule out MRI brain did not show any acute pathology. There is no further inpatient neurology work up needed.      Continue Keppra 500 mg BID per renal dose.   Continue medical management, neuro- check and fall precaution.  GI and DVT prophylaxis.    Patient is at high risk for complications and morbidity or mortality. There is high probability of imminent or life threatening deterioration in the patient's condition.  Patient is unable or incompetent to participate in giving a history and/or making decisions and discussion is necessary for determining treatment decisions.    I discussed the diagnosis, treatment plan and prognosis with the patient's family. Risk benefit and alternatives to the treatment were discussed. All questions and concerns were addressed. The patient's family demonstrated good understanding of the treatment plan.    My cumulative time taking care of this critically ill patient is 45 minutes  If you have any further questions, please do not hesitate to contact our team.  Thank you for allowing us to participate in this patient care.

## 2024-02-09 NOTE — PROGRESS NOTE ADULT - ASSESSMENT
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     PLAN  =====Neurologic=====  #CVA  - prior CVA x3 with no residual deficits    #myoclonic jerks  - on Keppra 500 mg BID, per neuro wishing to wean however per family request will continue at 500 mg BID, but now off valproate per neuro    - also per neuro, use precedex as needed for clinical sedation as opposed to propofol   - holding home  gabapentin and memantine in setting of somnolence  - continue lexapro     #AMS  - CT/CTA negative for stroke. EEG now off   - MRI brain - Cardiology form sent to MRI, arranging date and time for patient to go down when [employer] representative is available.       =====Pulmonary=====  #COVID  - s/p paxlovid and 1 dose remdesivir while inpatient  #Pleural effusions b/l  - CT chest from 1/16 showing new small bilateral pleural effusions/ atelectasis/ patchy bilateral ground-glass opacities are consistent with pulmonary edema. Repeat XR w/ 2/2 findings c/f edema as well  - s/p zosyn for aspiration PNA from 1/20- 1/28   - POCUS with resolution of b/l pleural effusions, still holding brilinta for possible procedure.  - Discussed w/ family, defer trach/PEG until MRI brain done  - Lasix 40 mg IVP x1 for diuresis again on 2/8      =====Cardiovascular=====  On small dose of pressors, wean as tolerated - started midodrine 10 mg q8h  #HTN  - on home amlodipine and metoprolol currently holding     #CAD  - on Brilinta (holding) aspirin and ranolazine continue   - as per vascular okay to hold Brilinta for possible pleural effusion thoracentesis  -have not heard back from cardiology regarding Brilinta / last stent 2022 , will hold   - on asa suppository -> To oral as pt w/ oral access now    #Afib  - pt with known history of pAF, first observed 2/1  - can consider starting AC and/or rate control if persists     =====GI=====  #upper GI bleed  - s/p EGD showing dieulafoy lesion and esophagitis likely 2/2 NGT irritation s/p 2 clips  - on protonix IV BID continue   - CT on 1/25 with cholelithiasis and sludge HIDA on 1/26 confirming likely acute choley, s/p IR perc cholecystostomy 1/26   - Discussed w/ family, defer trach/PEG until MRI brain done    #Diet  - tube feeds NGT     =====Renal/=====  #Electrolytes  - Maintain K>4, Phos>3, Mag>2, iCal>1  - baseline cr 1.5, at baseline  - on free water flushes for hyperNa. Trend BMP/Na     =====Endocrine=====  #DM2  - on lantus 12U and q6 SSI  - a1c 9.3, glucose wnl inpatient     =====Infectious Disease=====  #COVID   - s/p paxlovid and 1 dose remdesivir  # PNA  - CT chest showing ground glass opacities  - s/p zosyn  - intermittent episodes of fevers, likely source GI given choleycystitis? culture NGTD  - meropenem 5d course thru 2/1   - now monitoring off antibiotics    =====Heme/Onc=====  #DVT Ppx  - heparin sub-q    =====Ethics=====  FULL CODE.  After family discussion, amenable to MRI first, depending on severity of findings, will then consider trach/PEG. 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     PLAN  =====Neurologic=====  #CVA  - prior CVA x3 with no residual deficits    #myoclonic jerks  - on Keppra 500 mg BID, per neuro wishing to wean however per family request will continue at 500 mg BID, but now off valproate per neuro    - also per neuro, use precedex as needed for clinical sedation as opposed to propofol   - holding home  gabapentin and memantine in setting of somnolence  - continue lexapro     #AMS  - CT/CTA negative for stroke. EEG now off   - MRI brain - unremarkable findings. Family now amenable to Trach/PEG. Will arrange Trach next week.       =====Pulmonary=====  #Prior aspiration PNA  - s/p zosyn for aspiration PNA from 1/20- 1/28     #COVID  - s/p paxlovid and 1 dose remdesivir while inpatient    #Pleural effusions b/l  - CT chest from 1/16 showing new small bilateral pleural effusions/ atelectasis/ patchy bilateral ground-glass opacities are consistent with pulmonary edema.  - Diuresis PRN based on amount of pleural effusions on POCUS, as of 2/9 CXR done w/o effusions so defer diuresis.     #AHRF  - Intubated for airway protection in s/o AMS  - Holding brilinta for possible procedure. Will need to restart ASAP after trach/PEG    =====Cardiovascular=====  Normotensive, not requiring pressors or midodrine.     #HTN  - on home amlodipine and metoprolol currently holding     #CAD  - on Brilinta (holding) aspirin and ranolazine continue   - as per vascular okay to hold Brilinta for possible pleural effusion thoracentesis  -have not heard back from cardiology regarding Brilinta / last stent 2022 , will hold   -Need to restart brilinta ASAP after Trach/PEG    #Afib  - pt with known history of pAF, first observed 2/1  - can consider starting AC and/or rate control if persists     =====GI=====  #upper GI bleed  - s/p EGD showing dieulafoy lesion and esophagitis likely 2/2 NGT irritation s/p 2 clips  - on protonix IV BID continue   - CT on 1/25 with cholelithiasis and sludge HIDA on 1/26 confirming likely acute choley, s/p IR perc cholecystostomy 1/26   - Discussed w/ family, want trach/PEG. Discussed w/ GI - Only will do PEG 24-48 hours after trach placed. Recommend reaching out once Trach date is set.     #Diet  - tube feeds NGT     =====Renal/=====  #Electrolytes  - Maintain K>4, Phos>3, Mag>2, iCal>1  - baseline cr 1.5, at baseline  - on free water flushes for hyperNa. Trend BMP/Na     =====Endocrine=====  #DM2  - on lantus 12U and q6 SSI  - a1c 9.3, glucose wnl inpatient     =====Infectious Disease=====  #COVID   - s/p paxlovid and 1 dose remdesivir  # PNA  - CT chest showing ground glass opacities  - s/p zosyn  - intermittent episodes of fevers, likely source GI given choleycystitis? culture NGTD  - meropenem 5d course thru 2/1   - now monitoring off antibiotics    =====Heme/Onc=====  #DVT Ppx  - heparin sub-q    =====Ethics=====  FULL CODE.  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     PLAN  =====Neurologic=====  #CVA  - prior CVA x3 with no residual deficits    #myoclonic jerks  - on Keppra 500 mg BID, per neuro wishing to wean however per family request will continue at 500 mg BID, but now off valproate per neuro    - also per neuro, use precedex as needed for clinical sedation as opposed to propofol   - holding home  gabapentin and memantine in setting of somnolence  - continue lexapro     #AMS  - CT/CTA negative for stroke. EEG now off   - MRI brain - unremarkable findings. Family now amenable to Trach/PEG. Will arrange Trach next week.       =====Pulmonary=====  #Prior aspiration PNA  - s/p zosyn for aspiration PNA from 1/20- 1/28     #COVID  - s/p paxlovid and 1 dose remdesivir while inpatient    #Pleural effusions b/l  - CT chest from 1/16 showing new small bilateral pleural effusions/ atelectasis/ patchy bilateral ground-glass opacities are consistent with pulmonary edema.  - Diuresis PRN based on amount of pleural effusions on POCUS    #AHRF  - Intubated for airway protection in s/o AMS  - Holding brilinta for possible procedure. Will need to restart ASAP after trach/PEG    =====Cardiovascular=====  Normotensive, not requiring pressors or midodrine.     #HTN  - on home amlodipine and metoprolol currently holding     #CAD  - on Brilinta (holding) aspirin and ranolazine continue   - as per vascular okay to hold Brilinta for possible pleural effusion thoracentesis  -have not heard back from cardiology regarding Brilinta / last stent 2022 , will hold   -Need to restart brilinta ASAP after Trach/PEG    #Afib  - pt with known history of pAF, first observed 2/1  - can consider starting AC and/or rate control if persists     =====GI=====  #upper GI bleed  - s/p EGD showing dieulafoy lesion and esophagitis likely 2/2 NGT irritation s/p 2 clips  - on protonix IV BID continue   - CT on 1/25 with cholelithiasis and sludge HIDA on 1/26 confirming likely acute choley, s/p IR perc cholecystostomy 1/26   - Discussed w/ family, want trach/PEG. Discussed w/ GI - Only will do PEG 24-48 hours after trach placed. Recommend reaching out once Trach date is set.     #Diet  - tube feeds NGT     =====Renal/=====  #Electrolytes  - Maintain K>4, Phos>3, Mag>2, iCal>1  - baseline cr 1.5, at baseline  - on free water flushes for hyperNa. Trend BMP/Na   - Diuresis PRN    Trial of Void as of 2/9    =====Endocrine=====  #DM2  - on lantus 12U and q6 SSI  - a1c 9.3, glucose wnl inpatient     =====Infectious Disease=====  #COVID   - s/p paxlovid and 1 dose remdesivir  # PNA  - CT chest showing ground glass opacities  - s/p zosyn  - intermittent episodes of fevers, likely source GI given choleycystitis? culture NGTD  - meropenem 5d course thru 2/1   - now monitoring off antibiotics    =====Heme/Onc=====  #DVT Ppx  - heparin sub-q    =====Ethics=====  FULL CODE.  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     PLAN  =====Neurologic=====  #CVA  - prior CVA x3 with no residual deficits    #myoclonic jerks  - on Keppra 500 mg BID, per neuro wishing to wean however per family request will continue at 500 mg BID, but now off valproate per neuro    - also per neuro, use precedex as needed for clinical sedation as opposed to propofol   - holding home  gabapentin and memantine in setting of somnolence  - continue lexapro     #AMS  - CT/CTA negative for stroke. EEG now off   - MRI brain - unremarkable findings. Family now amenable to Trach/PEG. Will arrange Trach next week.       =====Pulmonary=====  #Prior aspiration PNA  - s/p zosyn for aspiration PNA from 1/20- 1/28     #COVID  - s/p paxlovid and 1 dose remdesivir while inpatient    #Pleural effusions b/l  - CT chest from 1/16 showing new small bilateral pleural effusions/ atelectasis/ patchy bilateral ground-glass opacities are consistent with pulmonary edema.  - Diuresis PRN based on amount of pleural effusions on POCUS    #AHRF  - Intubated for airway protection in s/o AMS  - Holding brilinta for possible procedure. Will need to restart ASAP after trach/PEG    =====Cardiovascular=====  Normotensive, not requiring pressors or midodrine.     #HTN  - on home amlodipine and metoprolol currently holding     #CAD  - on Brilinta (holding) aspirin and ranolazine continue   - as per vascular okay to hold Brilinta for possible pleural effusion thoracentesis  -have not heard back from cardiology regarding Brilinta / last stent 2022 , will hold   -Need to restart brilinta ASAP after Trach/PEG    #Afib  - pt with known history of pAF, first observed 2/1  - can consider starting AC and/or rate control if persists     =====GI=====  #upper GI bleed  - s/p EGD showing dieulafoy lesion and esophagitis likely 2/2 NGT irritation s/p 2 clips  - on protonix IV BID continue   - CT on 1/25 with cholelithiasis and sludge HIDA on 1/26 confirming likely acute choley, s/p IR perc cholecystostomy 1/26   - Discussed w/ family, want trach/PEG. Discussed w/ GI - Only will do PEG 24-48 hours after trach placed. Recommend reaching out when trach date is set  - Trach on 2/12, will need to e-mail GI after trach placed. Informed GI of date on 2/9    #Diet  - tube feeds NGT     =====Renal/=====  #Electrolytes  - Maintain K>4, Phos>3, Mag>2, iCal>1  - baseline cr 1.5, at baseline  - on free water flushes for hyperNa. Trend BMP/Na   - Diuresis PRN    Trial of Void as of 2/9    =====Endocrine=====  #DM2  - on lantus 12U and q6 SSI  - a1c 9.3, glucose wnl inpatient     =====Infectious Disease=====  #COVID   - s/p paxlovid and 1 dose remdesivir  # PNA  - CT chest showing ground glass opacities  - s/p zosyn  - intermittent episodes of fevers, likely source GI given choleycystitis? culture NGTD  - meropenem 5d course thru 2/1   - now monitoring off antibiotics    =====Heme/Onc=====  #DVT Ppx  - heparin sub-q    =====Ethics=====  FULL CODE.  After family discussion, Trach/PEG

## 2024-02-09 NOTE — PROGRESS NOTE ADULT - ASSESSMENT
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 During hospitalization, pt was taken off Brilinta for a planned pleural tap of lung effusions/GI had plans for PEG tube placement. Pt was intubated and since extubation, it has been noted that pt has been completely "aphasic"- nonverbal and not following commands for which neurology has been consulted. LKW 7:30 PM 1/30/24. Also, it has been noted that there is a R facial droop and pt has been having less movements on the LUE/LLE and pt has been looking towards the right side more than his left side, and has been having "myoclonic jerks" in his shoulders with some extended and inward "posturing in his arms". Prior to extubation, he was following commands. At baseline, prior to hospital admission pt was using a cane but independent of ADLs. Sedation stopped at 7:30 PM 1/31/24. EEG was started on 2/1/24 which showed intermittent generalized myoclonic seizures consisting of brief myoclonic jerks in addition to generalized ictal-interictal continuum. 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     IMPRESSION: Acute change in mental status w/ reported aphasia and decreased movements on Left side and transient R facial droop after extubation possibly 2/2 seizure-like activity. 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 Improved.    RECOMMENDATIONS:   [x] D/c vEEG  [x] s/p Valproic acid 1500mg x1 (2/1/24), valproic acid 1600mg x1 (completed at 2/2/24 at 12pm), valproic acid d/dangelo  [x] Continue levetiracetam 500mg IV Q12H  [x] d/c valproic acid  [x] MRI brain w/ and w/o con, unrevealing  [x] taper off of propofol and hold off on using midazolam  [x] if needed for clinical sedation, please use precedex  [x] No current neurovascular contraindications to home ASA 300mg suppository QD   [x] TTE study - EF 62% no regional wall abnl, mild mod severe AS mild AR  [x] Check HbA1c (9.3) and lipid panel (LDL 20)  [x] Telemonitoring  [x] Rest of care per MICU team       Assessment  89M PMHx of HTN, HLD, MI, CAD s/p CABG s/p stents (last stent May 2022) on daily ASA and brilinta, s/p PPM, DM2, CKD stage 3 (baseline Cr 1.2-1.3 as per family), PVD, CVA x3 (without residual deficits except for mild left facial numbness), and 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 During hospitalization, pt was taken off Brilinta for a planned pleural tap of lung effusions/GI had plans for PEG tube placement. Pt was intubated and since extubation, it has been noted that pt has been completely "aphasic"- nonverbal and not following commands for which neurology has been consulted. LKW 7:30 PM 1/30/24. Also, it has been noted that there is a R facial droop and pt has been having less movements on the LUE/LLE and pt has been looking towards the right side more than his left side, and has been having "myoclonic jerks" in his shoulders with some extended and inward "posturing in his arms". Prior to extubation, he was following commands. At baseline, prior to hospital admission pt was using a cane but independent of ADLs. Sedation stopped at 7:30 PM 1/31/24. EEG was started on 2/1/24 which showed intermittent generalized myoclonic seizures consisting of brief myoclonic jerks in addition to generalized ictal-interictal continuum. 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     IMPRESSION: Acute change in mental status w/ reported aphasia and decreased movements on Left side and transient R facial droop after extubation possibly 2/2 seizure-like activity vs resolved vascular event? 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 Improved.    RECOMMENDATIONS:   [x] D/c vEEG  [x] s/p Valproic acid 1500mg x1 (2/1/24), valproic acid 1600mg x1 (completed at 2/2/24 at 12pm), valproic acid d/dangelo  [x] Continue levetiracetam 500mg IV Q12H  [x] d/c valproic acid  [x] MRI brain w/ and w/o con, unrevealing  [x] taper off of propofol and hold off on using midazolam  [x] if needed for clinical sedation, please use precedex  [x] No current neurovascular contraindications to home ASA 300mg suppository QD   [x] TTE study - EF 62% no regional wall abnl, mild mod severe AS mild AR  [x] Check HbA1c (9.3) and lipid panel (LDL 20)  [x] Telemonitoring  [x] Rest of care per MICU team    Patient was seen on rounds with neuro attending. Recommendations above in agreement with neuro attending at time of note documentation and any resident documentation updates. Reviewed relevant neurologic imaging and findings with family and primary team. Discussed patient care with primary team, family and in agreement. Recommendations finalized/addended once signed by attending.

## 2024-02-09 NOTE — PROGRESS NOTE ADULT - ASSESSMENT
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     EKG SR RBBB (old per family)     1) Hypoxia   - s/p bronch   - Rt pleural effusion   - held lasix given Hypernatremia and worsening creat  - intubated again , AMS    - f/u neuro recs MRI brain shows no acute disease  - for trach and peg     2) CAD s/p CABG  - p/w chest pain. EKG non ischemic , trop -sera   - echo with normal lv and moderate AS   - c/w metoprolol   - chest pains  Trop mildly elevated and trending down likely sec to kidney disease,   - holding brilinta, was on it for SMA stent placed in 12/2023, held 2/2 anticipation for PEG placement, restart when no further invasive procedures planned    3) Covid +  - s/p remdesivir   - c/w supportive management   - t/t per primary team     4) Abd distension   -CTA AP obtained which showed  partially occlusive calcified and non-calcified plaque just distal to take-off of the SMA, with estimated at least 75% luminal narrowing. Limited evaluation of its distal branches. 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   -on Protonix IV q 12

## 2024-02-09 NOTE — PROGRESS NOTE ADULT - SUBJECTIVE AND OBJECTIVE BOX
Neurology Progress Note    SUBJECTIVE/OBJECTIVE/INTERVAL EVENTS: Patient seen and examined at bedside w/ neuro attending Dr Edwards and family bedside. Patient intubated, on precedex. MRI and EEG performed.     MEDICATIONS  (STANDING):  albuterol/ipratropium for Nebulization 3 milliLiter(s) Nebulizer every 6 hours  artificial  tears Solution 1 Drop(s) Left EYE four times a day  aspirin  chewable 81 milliGRAM(s) Enteral Tube daily  chlorhexidine 0.12% Liquid 15 milliLiter(s) Oral Mucosa every 12 hours  chlorhexidine 2% Cloths 1 Application(s) Topical daily  dexMEDEtomidine Infusion 0.2 MICROgram(s)/kG/Hr (3.97 mL/Hr) IV Continuous <Continuous>  dextrose 5%. 1000 milliLiter(s) (100 mL/Hr) IV Continuous <Continuous>  dextrose 5%. 1000 milliLiter(s) (50 mL/Hr) IV Continuous <Continuous>  dextrose 50% Injectable 25 Gram(s) IV Push once  dextrose 50% Injectable 12.5 Gram(s) IV Push once  dextrose 50% Injectable 25 Gram(s) IV Push once  escitalopram 10 milliGRAM(s) Oral daily  glucagon  Injectable 1 milliGRAM(s) IntraMuscular once  heparin   Injectable 5000 Unit(s) SubCutaneous every 8 hours  insulin glargine Injectable (LANTUS) 12 Unit(s) SubCutaneous <User Schedule>  insulin lispro (ADMELOG) corrective regimen sliding scale   SubCutaneous every 6 hours  levETIRAcetam  IVPB 500 milliGRAM(s) IV Intermittent every 12 hours  lidocaine   4% Patch 1 Patch Transdermal every 24 hours  midodrine 10 milliGRAM(s) Oral every 8 hours  pantoprazole  Injectable 40 milliGRAM(s) IV Push every 12 hours  petrolatum Ophthalmic Ointment 1 Application(s) Left EYE at bedtime  sodium chloride 3%  Inhalation 4 milliLiter(s) Inhalation two times a day  sucralfate suspension 1 Gram(s) Enteral Tube two times a day    MEDICATIONS  (PRN):  dextrose Oral Gel 15 Gram(s) Oral once PRN Blood Glucose LESS THAN 70 milliGRAM(s)/deciliter      VITALS & EXAMINATION:  Vital Signs Last 24 Hrs  T(C): 36 (09 Feb 2024 08:00), Max: 37.4 (08 Feb 2024 16:00)  T(F): 96.8 (09 Feb 2024 08:00), Max: 99.4 (08 Feb 2024 16:00)  HR: 76 (09 Feb 2024 12:00) (67 - 97)  BP: 127/64 (09 Feb 2024 12:00) (98/49 - 171/74)  BP(mean): 80 (09 Feb 2024 12:00) (54 - 99)  RR: 12 (09 Feb 2024 12:00) (12 - 22)  SpO2: 99% (09 Feb 2024 12:00) (93% - 100%)    Parameters below as of 09 Feb 2024 12:00  Patient On (Oxygen Delivery Method): ventilator    O2 Concentration (%): 30     Exam:  MS: Eyes closed to verbal, tactile stimulation.   CN: PERRL. No BTT. Eyes in primary gaze - EOMI with OCR.  Corneal reflex is intact and symmetric. No pepe facial asymmetry  Motor: No abnormal movements x4 extremities.   Sensory: withdraws to noxious stimuli x4 extremities  Coordiation/ Gait: unable to test     LABORATORY:  CBC                       7.3    7.03  )-----------( 262      ( 09 Feb 2024 00:45 )             23.5     Chem 02-09    137  |  99  |  30<H>  ----------------------------<  170<H>  3.8   |  27  |  1.44<H>    Ca    8.2<L>      09 Feb 2024 00:45  Phos  3.3     02-09  Mg     2.00     02-09    TPro  6.2  /  Alb  2.3<L>  /  TBili  0.3  /  DBili  x   /  AST  38  /  ALT  21  /  AlkPhos  87  02-09    LFTs LIVER FUNCTIONS - ( 09 Feb 2024 00:45 )  Alb: 2.3 g/dL / Pro: 6.2 g/dL / ALK PHOS: 87 U/L / ALT: 21 U/L / AST: 38 U/L / GGT: x           Coagulopathy PT/INR - ( 09 Feb 2024 00:45 )   PT: 11.6 sec;   INR: 1.04 ratio         PTT - ( 09 Feb 2024 00:45 )  PTT:36.0 sec  Lipid Panel   A1c   Cardiac enzymes     U/A Urinalysis Basic - ( 09 Feb 2024 00:45 )    Color: x / Appearance: x / SG: x / pH: x  Gluc: 170 mg/dL / Ketone: x  / Bili: x / Urobili: x   Blood: x / Protein: x / Nitrite: x   Leuk Esterase: x / RBC: x / WBC x   Sq Epi: x / Non Sq Epi: x / Bacteria: x      CSF  Immunological  Other    STUDIES & IMAGING: (EEG, CT, MR, U/S, TTE/RICHARD):    < from: MR Head w/wo IV Cont (02.08.24 @ 17:51) >    ACC: [id number] EXAM:  MR BRAIN WAW IC   ORDERED BY: RAISA JO     PROCEDURE DATE:  02/08/2024          INTERPRETATION:  .    CLINICAL INFORMATION: Seizure.    TECHNIQUE: Multiplanar multisequential MRI of the brain was acquired with   and without the administration of IV gadolinium. 7.5 cc's of IV Gadavist   was administered for the purposes of this examination. 0 cc were   discarded.    COMPARISON: Prior CT code stroke series dated 1/31/2024. Prior brain MRI   study dated 6/3/2022.    FINDINGS:Multiple small rounded nonspecific foci of T2/FLAIR   hyperintensity are noted throughout the deep and periventricular white   matter of the cerebral hemispheres. There is no associated mass effect.   There is no evidence of acute ischemia on the diffusion-weighted images.   There is no abnormal brain parenchymal or leptomeningeal enhancement.    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    There is diffuse cerebral volume loss with prominence of the sulci,   fissures, and cisternal spaces which is normal for the patient's age.   Ventriculomegaly appears unchanged. Flow-voids are noted throughout the   major intracranial vessels, on the T2 weighted images, consistent with   their patency. A partially empty sella appears unchanged. The posterior   fossa appears unremarkable.    Scattered mucosal thickening is seen throughout the paranasal sinuses.   Bilateral mastoid effusions appear unchanged Calvarial signal is within   normal limits. There is evidence of bilateral cataract removal.    IMPRESSION: No acute intracranial hemorrhage, acute ischemia, or abnormal   intracranial enhancement.    Multiple nonspecific abnormal white matter foci of T2/FLAIR prolongation   statistically favoring microvascular type changes.    Unchanged appearing bilateral mastoid effusions.    --- End of Report ---    JOCELYN WEISS MD; Attending Radiologist  This document has been electronically signed. Feb 8 2024  8:55PM    < end of copied text >    EEG report 2/6/24:     EEG Classification / Summary:  Abnormal video-EEG in a lethargic patient.  -GPDs initially at 1-2 Hz, at times with triphasic morphology, most prevalent with stimulation or arousal, decreasing in prevalence over the course of recording  -Moderate diffuse slowing  -No electrographic seizures    Clinical Impression:  -GPDs with triphasic morphology can be seen in toxic-metabolic encephalopathies and indicate risk of generalized seizures. Risk decreases over the course of recording.  -Moderate diffuse cerebral dysfunction is nonspecific in etiology.     EEG report 2/5/24:    EEG Classification / Summary:  Abnormal video-EEG in a lethargic patient.  -Abundant GPDs at 1-2 Hz, at times with triphasic morphology, most prevalent with stimulation or arousal  -Moderate diffuse slowing  -One push-button event at 22:39. Patient appears to be coughing    Clinical Impression:  -GPDs with triphasic morphology can be seen in toxic-metabolic encephalopathies and indicate risk of generalized seizures.  -Moderate diffuse cerebral dysfunction is nonspecific in etiology.

## 2024-02-09 NOTE — PROGRESS NOTE ADULT - ATTENDING COMMENTS
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 Course c/b acute cholecystitis s/p perc asa 1/26 by IR, also with recent SMA stent.     Patient failed trial of extubation, also possible sz. More awake. Was pending tracheostomy but family declined and would want an MRI prior to assess for structural abn prior to tracheostomy.     #Acute hypoxemic/hypercapnic respiratory failure  #Multifocal Pneumonia  # Shock on pressors  #Dysphagia  #Acute cholecystitis  #Encephalopathy  #Afib - New, intermittent  #SMA stent   #Petechial hemorrhages.   - Frequent aspiration noted clinically and on CT scans. now reintubated, unable to clear secretions. 2/2 to poor cough and mental status.   - EEG now without sz, neuro following c/w keppra 500. D/C eeg. MRI without acute findings.   - S/P course of antibiotics for MSSA pna as well as aspiration. Continue to monitor off abx.   - C/W vent, adjust based on gas,  will trend based on blood gas. Will diurese PRN  - S/P IR drainage of biliary tube, monitor output. Reconsult or IR once acute issues resolve.   - new AFib x1, not on full ac at this time. was In the setting of critical illness, currently being monitored on tele and NSR.  - C/W ASA, Brilinta on hold for possible procedure. Per vascular can hold temporarily. Will restart soon as patient is trached/PEG.  - Seen by optho, continue to monitor  - oropharyngeal dysphagia will need peg given repeated aspiration/ GI reconsulted.   - shock on pressors, wean as tolerated. Likely 2/2 to vasoplegia.   - Family now in agreement with trach and peg. Pending Trach with IP on monday.   - DVT ppx - SQH  - Dispo- family agreeable with trach and peg.

## 2024-02-09 NOTE — CHART NOTE - NSCHARTNOTEFT_GEN_A_CORE
NUTRITION FOLLOW-UP          89 yo M w Hx of  CAD s/p CABG s/p stents (last stent 5/2022), s/p PPM, HTN, HLD, T2DM, CKD, PVD, prior CVA x3. Presented with COVID 19.    Admitted to MICU for acute hypoxic respiratory failure and worsening mental status requiring intubation.   Extubated (2/1) but reintubated (2/2) course further c/b acute cholecystitis requiring perc choley on (1/26).  Pt. pending possible trach/PEG    Bowel movement (2/8). +Abdominal distention noted.  No vomiting/diarrhea reported.  Weight status variable, which may be somewhat related to fluid changes, particularly considering diuresis during hospitalization & given currently w 2+ generalized edema.      Pt. intermittently NPO, but generally has been receiving Glucerna 1.5 @ 40mL/hr.  However, this enteral regimen inadequately meets estimated kcal & protein requirements.  Per nutrition documentation (1/26), RDN recommended increasing Glucerna 1.5 to goal of 45mL/hr continuously with additional protein supplementation (LPS 1x daily) to more appropriately meet estimated nutrient needs.    Nutrition suggestions to be communicated to physicians.        _________________Diet___________________  Diet, NPO with Tube Feed:   Tube Feeding Modality: Nasogastric  Glucerna 1.5 [place] (GLUCERNA1.5RTH)  Total Volume for 24 Hours (mL): 960  Continuous  Starting Tube Feed Rate {mL per Hour}: 10  Increase Tube Feed Rate by (mL): 10     Every 6 hours  Until Goal Tube Feed Rate (mL per Hour): 40  Tube Feed Duration (in Hours): 24  Tube Feed Start Time: 12:00  Free Water Flush  Bolus   Total Volume per Flush (mL): 250   Frequency: Every 4 Hours (02-07-24 @ 01:10) [Active]    Current TF provides:  960mL total volume  1440 kcals  79g protein  729mL free water       Weight:                    Height:  69" /175.2cm                   Ideal Body Weight: 160lbs / 72.7kg  75.9kg (2/9)  82.4kg (1/3)  78.9kg (1/26)  76.2kg (1/19)  74.7kg (12/27)          _____Estimated Energy Needs (based on IBW)_____  20-25 KCals/kg = 4805-9475 KCals/d  1.4-1.6g protein/kg = 102-116g protein/d        Edema: 2+ generalized  Skin: No noted pressure injuries        ________________________Pertinent Medications____________   MEDICATIONS  (STANDING):  albuterol/ipratropium for Nebulization 3 milliLiter(s) Nebulizer every 6 hours  artificial  tears Solution 1 Drop(s) Left EYE four times a day  aspirin  chewable 81 milliGRAM(s) Enteral Tube daily  chlorhexidine 0.12% Liquid 15 milliLiter(s) Oral Mucosa every 12 hours  chlorhexidine 2% Cloths 1 Application(s) Topical daily  dexMEDEtomidine Infusion 0.2 MICROgram(s)/kG/Hr (3.97 mL/Hr) IV Continuous <Continuous>  dextrose 5%. 1000 milliLiter(s) (100 mL/Hr) IV Continuous <Continuous>  dextrose 5%. 1000 milliLiter(s) (50 mL/Hr) IV Continuous <Continuous>  dextrose 50% Injectable 25 Gram(s) IV Push once  dextrose 50% Injectable 12.5 Gram(s) IV Push once  dextrose 50% Injectable 25 Gram(s) IV Push once  escitalopram 10 milliGRAM(s) Oral daily  glucagon  Injectable 1 milliGRAM(s) IntraMuscular once  heparin   Injectable 5000 Unit(s) SubCutaneous every 8 hours  insulin glargine Injectable (LANTUS) 12 Unit(s) SubCutaneous <User Schedule>  insulin lispro (ADMELOG) corrective regimen sliding scale   SubCutaneous every 6 hours  levETIRAcetam  IVPB 500 milliGRAM(s) IV Intermittent every 12 hours  lidocaine   4% Patch 1 Patch Transdermal every 24 hours  midodrine 10 milliGRAM(s) Oral every 8 hours  pantoprazole  Injectable 40 milliGRAM(s) IV Push every 12 hours  petrolatum Ophthalmic Ointment 1 Application(s) Left EYE at bedtime  sodium chloride 3%  Inhalation 4 milliLiter(s) Inhalation two times a day  sucralfate suspension 1 Gram(s) Enteral Tube two times a day    MEDICATIONS  (PRN):  dextrose Oral Gel 15 Gram(s) Oral once PRN Blood Glucose LESS THAN 70 milliGRAM(s)/deciliter          _________________________Pertinent Labs____________________     02-09    137  |  99  |  30<H>  ----------------------------<  170<H>  3.8   |  27  |  1.44<H>    Ca    8.2<L>      09 Feb 2024 00:45  Phos  3.3     02-09  Mg     2.00     02-09                          7.3    7.03  )-----------( 262      ( 09 Feb 2024 00:45 )             23.5         CAPILLARY BLOOD GLUCOSE    POCT Blood Glucose.: 237 mg/dL (02-09-24 @ 05:59)  POCT Blood Glucose.: 155 mg/dL (02-09-24 @ 00:13)  POCT Blood Glucose.: 173 mg/dL (02-08-24 @ 17:54)  POCT Blood Glucose.: 221 mg/dL (02-08-24 @ 11:30)          ________NUTRITION Dx_________    Pt. remains severely malnourished         PLAN/RECOMMENDATIONS:    1) Increase Glucerna 1.5 to goal of 45mL/hr x 24hrs + Liquid Protein Supplement (LPS) 1 packet per day    will provide:  1080mL total volume  1620 kcals  89g protein (+ 15g additional protein via LPS)= 104g total protein/d  820mL free water     2) Obtain daily weights  3) Additional free water flushes deferred to providers  4) Monitor tolerance to TF, GI status, electrolytes, glucose     RDN remains available and will f/u PRN.          Catherine Dumont, RDN, CDN       pager [phone] or MS Teams

## 2024-02-09 NOTE — PROGRESS NOTE ADULT - SUBJECTIVE AND OBJECTIVE BOX
NEPHROLOGY     Patient seen and examined with family at bedside, in no acute distress.   intubated   MEDICATIONS  (STANDING):  albuterol/ipratropium for Nebulization 3 milliLiter(s) Nebulizer every 6 hours  artificial  tears Solution 1 Drop(s) Left EYE four times a day  aspirin  chewable 81 milliGRAM(s) Enteral Tube daily  chlorhexidine 0.12% Liquid 15 milliLiter(s) Oral Mucosa every 12 hours  chlorhexidine 2% Cloths 1 Application(s) Topical daily  dexMEDEtomidine Infusion 0.2 MICROgram(s)/kG/Hr (3.97 mL/Hr) IV Continuous <Continuous>  dextrose 5%. 1000 milliLiter(s) (100 mL/Hr) IV Continuous <Continuous>  dextrose 5%. 1000 milliLiter(s) (50 mL/Hr) IV Continuous <Continuous>  dextrose 50% Injectable 25 Gram(s) IV Push once  dextrose 50% Injectable 12.5 Gram(s) IV Push once  dextrose 50% Injectable 25 Gram(s) IV Push once  escitalopram 10 milliGRAM(s) Oral daily  glucagon  Injectable 1 milliGRAM(s) IntraMuscular once  heparin   Injectable 5000 Unit(s) SubCutaneous every 8 hours  insulin glargine Injectable (LANTUS) 12 Unit(s) SubCutaneous <User Schedule>  insulin lispro (ADMELOG) corrective regimen sliding scale   SubCutaneous every 6 hours  levETIRAcetam  IVPB 500 milliGRAM(s) IV Intermittent every 12 hours  lidocaine   4% Patch 1 Patch Transdermal every 24 hours  norepinephrine Infusion 0.05 MICROgram(s)/kG/Min (3.72 mL/Hr) IV Continuous <Continuous>  pantoprazole  Injectable 40 milliGRAM(s) IV Push every 12 hours  petrolatum Ophthalmic Ointment 1 Application(s) Left EYE at bedtime  sodium chloride 3%  Inhalation 4 milliLiter(s) Inhalation two times a day  sucralfate suspension 1 Gram(s) Enteral Tube two times a day    VITALS:  T(C): , Max: 37.3 (02-07-24 @ 04:00)  T(F): , Max: 99.2 (02-07-24 @ 04:00)  HR: 104 (02-07-24 @ 11:00)  BP: 111/72 (02-07-24 @ 11:00)  BP(mean): 80 (02-07-24 @ 11:00)  RR: 21 (02-07-24 @ 11:00)  SpO2: 99% (02-07-24 @ 11:00)  Wt(kg): --  I and O's:    02-06 @ 07:01  -  02-07 @ 07:00  --------------------------------------------------------  IN: 1895.4 mL / OUT: 1315 mL / NET: 580.4 mL    PHYSICAL EXAM:  Constitutional: no distress   HEENT: on bipap  Neck: No LAD, No JVD  Respiratory: diminished b/l  Cardiovascular: S1 and S2  Gastrointestinal: BS+, soft, NT/ND  Extremities: + l/e edema  Neurological: UTO  : + Moss  Skin: No rashes     LABS:                        7.3    6.96  )-----------( 290      ( 07 Feb 2024 00:10 )             23.7     02-07    139  |  102  |  31<H>  ----------------------------<  181<H>  4.1   |  29  |  1.57<H>    Ca    7.9<L>      07 Feb 2024 00:10  Phos  3.8     02-07  Mg     1.90     02-07    TPro  6.2  /  Alb  2.2<L>  /  TBili  0.2  /  DBili  x   /  AST  22  /  ALT  21  /  AlkPhos  87  02-07   NEPHROLOGY     Patient seen and examined at bedside, in no acute distress.   intubated   son at bedside   MEDICATIONS  (STANDING):  albuterol/ipratropium for Nebulization 3 milliLiter(s) Nebulizer every 6 hours  artificial  tears Solution 1 Drop(s) Left EYE four times a day  aspirin  chewable 81 milliGRAM(s) Enteral Tube daily  chlorhexidine 0.12% Liquid 15 milliLiter(s) Oral Mucosa every 12 hours  chlorhexidine 2% Cloths 1 Application(s) Topical daily  dexMEDEtomidine Infusion 0.2 MICROgram(s)/kG/Hr (3.97 mL/Hr) IV Continuous <Continuous>  dextrose 5%. 1000 milliLiter(s) (100 mL/Hr) IV Continuous <Continuous>  dextrose 5%. 1000 milliLiter(s) (50 mL/Hr) IV Continuous <Continuous>  dextrose 50% Injectable 25 Gram(s) IV Push once  dextrose 50% Injectable 12.5 Gram(s) IV Push once  dextrose 50% Injectable 25 Gram(s) IV Push once  escitalopram 10 milliGRAM(s) Oral daily  glucagon  Injectable 1 milliGRAM(s) IntraMuscular once  heparin   Injectable 5000 Unit(s) SubCutaneous every 8 hours  insulin glargine Injectable (LANTUS) 12 Unit(s) SubCutaneous <User Schedule>  insulin lispro (ADMELOG) corrective regimen sliding scale   SubCutaneous every 6 hours  levETIRAcetam  IVPB 500 milliGRAM(s) IV Intermittent every 12 hours  lidocaine   4% Patch 1 Patch Transdermal every 24 hours  norepinephrine Infusion 0.05 MICROgram(s)/kG/Min (3.72 mL/Hr) IV Continuous <Continuous>  pantoprazole  Injectable 40 milliGRAM(s) IV Push every 12 hours  petrolatum Ophthalmic Ointment 1 Application(s) Left EYE at bedtime  sodium chloride 3%  Inhalation 4 milliLiter(s) Inhalation two times a day  sucralfate suspension 1 Gram(s) Enteral Tube two times a day    VITALS:  T(C): , Max: 37.3 (02-07-24 @ 04:00)  T(F): , Max: 99.2 (02-07-24 @ 04:00)  HR: 104 (02-07-24 @ 11:00)  BP: 111/72 (02-07-24 @ 11:00)  BP(mean): 80 (02-07-24 @ 11:00)  RR: 21 (02-07-24 @ 11:00)  SpO2: 99% (02-07-24 @ 11:00)  Wt(kg): --  I and O's:    02-06 @ 07:01  -  02-07 @ 07:00  --------------------------------------------------------  IN: 1895.4 mL / OUT: 1315 mL / NET: 580.4 mL    PHYSICAL EXAM:  Constitutional: no distress   HEENT: intubated  tube   Neck: No LAD, No JVD  Respiratory: diminished b/l  Cardiovascular: S1 and S2  Gastrointestinal: BS+, soft, NT/ND  Extremities: + l/e edema  Neurological: UTO  : + Moss  Skin: No rashes     LABS:                        7.3    6.96  )-----------( 290      ( 07 Feb 2024 00:10 )             23.7     02-07    139  |  102  |  31<H>  ----------------------------<  181<H>  4.1   |  29  |  1.57<H>    Ca    7.9<L>      07 Feb 2024 00:10  Phos  3.8     02-07  Mg     1.90     02-07    TPro  6.2  /  Alb  2.2<L>  /  TBili  0.2  /  DBili  x   /  AST  22  /  ALT  21  /  AlkPhos  87  02-07

## 2024-02-10 LAB
ALBUMIN SERPL ELPH-MCNC: 2.2 G/DL — LOW (ref 3.3–5)
ALP SERPL-CCNC: 112 U/L — SIGNIFICANT CHANGE UP (ref 40–120)
ALT FLD-CCNC: 18 U/L — SIGNIFICANT CHANGE UP (ref 4–41)
ANION GAP SERPL CALC-SCNC: 9 MMOL/L — SIGNIFICANT CHANGE UP (ref 7–14)
APTT BLD: 31.3 SEC — SIGNIFICANT CHANGE UP (ref 24.5–35.6)
AST SERPL-CCNC: 24 U/L — SIGNIFICANT CHANGE UP (ref 4–40)
BASE EXCESS BLDV CALC-SCNC: 8.1 MMOL/L — HIGH (ref -2–3)
BILIRUB SERPL-MCNC: 0.2 MG/DL — SIGNIFICANT CHANGE UP (ref 0.2–1.2)
BLOOD GAS VENOUS COMPREHENSIVE RESULT: SIGNIFICANT CHANGE UP
BUN SERPL-MCNC: 30 MG/DL — HIGH (ref 7–23)
CALCIUM SERPL-MCNC: 8.2 MG/DL — LOW (ref 8.4–10.5)
CHLORIDE BLDV-SCNC: 102 MMOL/L — SIGNIFICANT CHANGE UP (ref 96–108)
CHLORIDE SERPL-SCNC: 100 MMOL/L — SIGNIFICANT CHANGE UP (ref 98–107)
CO2 BLDV-SCNC: 35.2 MMOL/L — HIGH (ref 22–26)
CO2 SERPL-SCNC: 29 MMOL/L — SIGNIFICANT CHANGE UP (ref 22–31)
CREAT SERPL-MCNC: 1.42 MG/DL — HIGH (ref 0.5–1.3)
EGFR: 47 ML/MIN/1.73M2 — LOW
GAS PNL BLDV: 136 MMOL/L — SIGNIFICANT CHANGE UP (ref 136–145)
GLUCOSE BLDC GLUCOMTR-MCNC: 141 MG/DL — HIGH (ref 70–99)
GLUCOSE BLDC GLUCOMTR-MCNC: 173 MG/DL — HIGH (ref 70–99)
GLUCOSE BLDC GLUCOMTR-MCNC: 183 MG/DL — HIGH (ref 70–99)
GLUCOSE BLDC GLUCOMTR-MCNC: 277 MG/DL — HIGH (ref 70–99)
GLUCOSE BLDV-MCNC: 174 MG/DL — HIGH (ref 70–99)
GLUCOSE SERPL-MCNC: 177 MG/DL — HIGH (ref 70–99)
HCO3 BLDV-SCNC: 34 MMOL/L — HIGH (ref 22–29)
HCT VFR BLD CALC: 22.9 % — LOW (ref 39–50)
HCT VFR BLDA CALC: 22 % — LOW (ref 39–51)
HGB BLD CALC-MCNC: 7.2 G/DL — LOW (ref 12.6–17.4)
HGB BLD-MCNC: 7.1 G/DL — LOW (ref 13–17)
INR BLD: 1.03 RATIO — SIGNIFICANT CHANGE UP (ref 0.85–1.18)
LACTATE BLDV-MCNC: 2.1 MMOL/L — HIGH (ref 0.5–2)
MAGNESIUM SERPL-MCNC: 2.1 MG/DL — SIGNIFICANT CHANGE UP (ref 1.6–2.6)
MCHC RBC-ENTMCNC: 29.2 PG — SIGNIFICANT CHANGE UP (ref 27–34)
MCHC RBC-ENTMCNC: 31 GM/DL — LOW (ref 32–36)
MCV RBC AUTO: 94.2 FL — SIGNIFICANT CHANGE UP (ref 80–100)
NRBC # BLD: 0 /100 WBCS — SIGNIFICANT CHANGE UP (ref 0–0)
NRBC # FLD: 0 K/UL — SIGNIFICANT CHANGE UP (ref 0–0)
PCO2 BLDV: 53 MMHG — SIGNIFICANT CHANGE UP (ref 42–55)
PH BLDV: 7.41 — SIGNIFICANT CHANGE UP (ref 7.32–7.43)
PHOSPHATE SERPL-MCNC: 3 MG/DL — SIGNIFICANT CHANGE UP (ref 2.5–4.5)
PLATELET # BLD AUTO: 285 K/UL — SIGNIFICANT CHANGE UP (ref 150–400)
PO2 BLDV: 35 MMHG — SIGNIFICANT CHANGE UP (ref 25–45)
POTASSIUM BLDV-SCNC: 4.5 MMOL/L — SIGNIFICANT CHANGE UP (ref 3.5–5.1)
POTASSIUM SERPL-MCNC: 4.5 MMOL/L — SIGNIFICANT CHANGE UP (ref 3.5–5.3)
POTASSIUM SERPL-SCNC: 4.5 MMOL/L — SIGNIFICANT CHANGE UP (ref 3.5–5.3)
PROT SERPL-MCNC: 6.2 G/DL — SIGNIFICANT CHANGE UP (ref 6–8.3)
PROTHROM AB SERPL-ACNC: 11.5 SEC — SIGNIFICANT CHANGE UP (ref 9.5–13)
RBC # BLD: 2.43 M/UL — LOW (ref 4.2–5.8)
RBC # FLD: 17.6 % — HIGH (ref 10.3–14.5)
SAO2 % BLDV: 52.3 % — LOW (ref 67–88)
SODIUM SERPL-SCNC: 138 MMOL/L — SIGNIFICANT CHANGE UP (ref 135–145)
WBC # BLD: 6.41 K/UL — SIGNIFICANT CHANGE UP (ref 3.8–10.5)
WBC # FLD AUTO: 6.41 K/UL — SIGNIFICANT CHANGE UP (ref 3.8–10.5)

## 2024-02-10 PROCEDURE — 99291 CRITICAL CARE FIRST HOUR: CPT

## 2024-02-10 RX ORDER — DOXAZOSIN MESYLATE 4 MG
2 TABLET ORAL AT BEDTIME
Refills: 0 | Status: DISCONTINUED | OUTPATIENT
Start: 2024-02-10 | End: 2024-03-27

## 2024-02-10 RX ORDER — PROPOFOL 10 MG/ML
10 INJECTION, EMULSION INTRAVENOUS
Qty: 1000 | Refills: 0 | Status: DISCONTINUED | OUTPATIENT
Start: 2024-02-10 | End: 2024-02-13

## 2024-02-10 RX ADMIN — CHLORHEXIDINE GLUCONATE 1 APPLICATION(S): 213 SOLUTION TOPICAL at 12:46

## 2024-02-10 RX ADMIN — PANTOPRAZOLE SODIUM 40 MILLIGRAM(S): 20 TABLET, DELAYED RELEASE ORAL at 05:28

## 2024-02-10 RX ADMIN — Medication 1 DROP(S): at 12:46

## 2024-02-10 RX ADMIN — Medication 1 APPLICATION(S): at 22:12

## 2024-02-10 RX ADMIN — Medication 3 MILLILITER(S): at 19:15

## 2024-02-10 RX ADMIN — DEXMEDETOMIDINE HYDROCHLORIDE IN 0.9% SODIUM CHLORIDE 3.97 MICROGRAM(S)/KG/HR: 4 INJECTION INTRAVENOUS at 00:32

## 2024-02-10 RX ADMIN — Medication 1 DROP(S): at 23:26

## 2024-02-10 RX ADMIN — SODIUM CHLORIDE 4 MILLILITER(S): 9 INJECTION INTRAMUSCULAR; INTRAVENOUS; SUBCUTANEOUS at 09:43

## 2024-02-10 RX ADMIN — Medication 6: at 00:30

## 2024-02-10 RX ADMIN — PROPOFOL 4.76 MICROGRAM(S)/KG/MIN: 10 INJECTION, EMULSION INTRAVENOUS at 18:39

## 2024-02-10 RX ADMIN — INSULIN GLARGINE 12 UNIT(S): 100 INJECTION, SOLUTION SUBCUTANEOUS at 12:32

## 2024-02-10 RX ADMIN — SODIUM CHLORIDE 4 MILLILITER(S): 9 INJECTION INTRAMUSCULAR; INTRAVENOUS; SUBCUTANEOUS at 19:15

## 2024-02-10 RX ADMIN — DEXMEDETOMIDINE HYDROCHLORIDE IN 0.9% SODIUM CHLORIDE 3.97 MICROGRAM(S)/KG/HR: 4 INJECTION INTRAVENOUS at 20:19

## 2024-02-10 RX ADMIN — Medication 2: at 05:38

## 2024-02-10 RX ADMIN — LEVETIRACETAM 400 MILLIGRAM(S): 250 TABLET, FILM COATED ORAL at 18:38

## 2024-02-10 RX ADMIN — CHLORHEXIDINE GLUCONATE 15 MILLILITER(S): 213 SOLUTION TOPICAL at 05:17

## 2024-02-10 RX ADMIN — Medication 3 MILLILITER(S): at 15:31

## 2024-02-10 RX ADMIN — Medication 6: at 18:44

## 2024-02-10 RX ADMIN — PANTOPRAZOLE SODIUM 40 MILLIGRAM(S): 20 TABLET, DELAYED RELEASE ORAL at 18:43

## 2024-02-10 RX ADMIN — Medication 3 MILLILITER(S): at 09:43

## 2024-02-10 RX ADMIN — MIDODRINE HYDROCHLORIDE 10 MILLIGRAM(S): 2.5 TABLET ORAL at 22:11

## 2024-02-10 RX ADMIN — LIDOCAINE 1 PATCH: 4 CREAM TOPICAL at 20:18

## 2024-02-10 RX ADMIN — HEPARIN SODIUM 5000 UNIT(S): 5000 INJECTION INTRAVENOUS; SUBCUTANEOUS at 13:47

## 2024-02-10 RX ADMIN — Medication 1 GRAM(S): at 05:17

## 2024-02-10 RX ADMIN — ESCITALOPRAM OXALATE 10 MILLIGRAM(S): 10 TABLET, FILM COATED ORAL at 12:45

## 2024-02-10 RX ADMIN — LEVETIRACETAM 400 MILLIGRAM(S): 250 TABLET, FILM COATED ORAL at 05:12

## 2024-02-10 RX ADMIN — Medication 1 DROP(S): at 05:17

## 2024-02-10 RX ADMIN — DEXMEDETOMIDINE HYDROCHLORIDE IN 0.9% SODIUM CHLORIDE 3.97 MICROGRAM(S)/KG/HR: 4 INJECTION INTRAVENOUS at 14:06

## 2024-02-10 RX ADMIN — Medication 1 GRAM(S): at 18:43

## 2024-02-10 RX ADMIN — LIDOCAINE 1 PATCH: 4 CREAM TOPICAL at 07:15

## 2024-02-10 RX ADMIN — HEPARIN SODIUM 5000 UNIT(S): 5000 INJECTION INTRAVENOUS; SUBCUTANEOUS at 05:25

## 2024-02-10 RX ADMIN — Medication 2 MILLIGRAM(S): at 22:28

## 2024-02-10 RX ADMIN — PROPOFOL 4.76 MICROGRAM(S)/KG/MIN: 10 INJECTION, EMULSION INTRAVENOUS at 20:18

## 2024-02-10 RX ADMIN — CHLORHEXIDINE GLUCONATE 15 MILLILITER(S): 213 SOLUTION TOPICAL at 18:39

## 2024-02-10 RX ADMIN — Medication 1 DROP(S): at 18:44

## 2024-02-10 RX ADMIN — Medication 2: at 13:15

## 2024-02-10 RX ADMIN — Medication 3 MILLILITER(S): at 03:50

## 2024-02-10 RX ADMIN — Medication 81 MILLIGRAM(S): at 12:45

## 2024-02-10 RX ADMIN — HEPARIN SODIUM 5000 UNIT(S): 5000 INJECTION INTRAVENOUS; SUBCUTANEOUS at 22:11

## 2024-02-10 RX ADMIN — LIDOCAINE 1 PATCH: 4 CREAM TOPICAL at 11:15

## 2024-02-10 NOTE — PROGRESS NOTE ADULT - ATTENDING COMMENTS
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 Course c/b acute cholecystitis s/p perc asa 1/26 by IR, also with recent SMA stent.     Patient failed trial of extubation, also possible sz. More awake. Was pending tracheostomy but family declined and would want an MRI prior to assess for structural abn prior to tracheostomy.     #Acute hypoxemic/hypercapnic respiratory failure  #Multifocal Pneumonia  #Dysphagia  #Acute cholecystitis  #Encephalopathy  #Afib - New, intermittent  #SMA stent   #Petechial hemorrhages.   - Frequent aspiration noted clinically and on CT scans. now reintubated, unable to clear secretions. 2/2 to poor cough and mental status.   - EEG now without sz, neuro following c/w keppra 500. D/C eeg. MRI without acute findings.   - S/P course of antibiotics for MSSA pna as well as aspiration. Continue to monitor off abx.   - C/W vent, adjust based on gas,  will trend based on blood gas. Will diurese PRN  - S/P IR drainage of biliary tube, monitor output. Reconsult IR once acute issues resolve.   - new AFib x1, not on full ac at this time. was In the setting of critical illness, currently being monitored on tele and NSR.  - C/W ASA, Brilinta on hold for possible procedure. Per vascular can hold temporarily. Will restart soon as patient is trached/PEG.  - Seen by optho, continue to monitor  - Grossly overloaded with pulmonary edema and effusions. Lasix PRN.   - oropharyngeal dysphagia will need peg given repeated aspiration/ GI reconsulted.   - Family now in agreement with trach and peg. Pending Trach with IP on monday.   - DVT ppx - SQH  - Dispo- family agreeable with trach and peg.

## 2024-02-10 NOTE — PROGRESS NOTE ADULT - ASSESSMENT
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     PLAN  =====Neurologic=====  #CVA  - prior CVA x3 with no residual deficits    #myoclonic jerks  - on Keppra 500 mg BID, per neuro wishing to wean however per family request will continue at 500 mg BID, but now off valproate per neuro    - also per neuro, use precedex as needed for clinical sedation as opposed to propofol   - holding home  gabapentin and memantine in setting of somnolence  - continue lexapro     #AMS  - CT/CTA negative for stroke. EEG now off   - MRI brain - unremarkable findings. Family now amenable to Trach/PEG. Will arrange Trach next week.       =====Pulmonary=====  #Prior aspiration PNA  - s/p zosyn for aspiration PNA from 1/20- 1/28     #COVID  - s/p paxlovid and 1 dose remdesivir while inpatient    #Pleural effusions b/l  - CT chest from 1/16 showing new small bilateral pleural effusions/ atelectasis/ patchy bilateral ground-glass opacities are consistent with pulmonary edema.  - Diuresis PRN based on amount of pleural effusions on POCUS    #AHRF  - Intubated for airway protection in s/o AMS  - Holding brilinta for possible procedure. Will need to restart ASAP after trach/PEG    =====Cardiovascular=====  Normotensive, not requiring pressors or midodrine.     #HTN  - on home amlodipine and metoprolol currently holding     #CAD  - on Brilinta (holding) aspirin and ranolazine continue   - as per vascular okay to hold Brilinta for possible pleural effusion thoracentesis  -have not heard back from cardiology regarding Brilinta / last stent 2022 , will hold   -Need to restart brilinta ASAP after Trach/PEG    #Afib  - pt with known history of pAF, first observed 2/1  - can consider starting AC and/or rate control if persists     =====GI=====  #upper GI bleed  - s/p EGD showing dieulafoy lesion and esophagitis likely 2/2 NGT irritation s/p 2 clips  - on protonix IV BID continue   - CT on 1/25 with cholelithiasis and sludge HIDA on 1/26 confirming likely acute choley, s/p IR perc cholecystostomy 1/26   - Discussed w/ family, want trach/PEG. Discussed w/ GI - Only will do PEG 24-48 hours after trach placed. Recommend reaching out when trach date is set  - Trach on 2/12, will need to e-mail GI after trach placed. Informed GI of date on 2/9    #Diet  - tube feeds NGT     =====Renal/=====  #Electrolytes  - Maintain K>4, Phos>3, Mag>2, iCal>1  - baseline cr 1.5, at baseline  - on free water flushes for hyperNa. Trend BMP/Na   - Diuresis PRN    Trial of Void as of 2/9    =====Endocrine=====  #DM2  - on lantus 12U and q6 SSI  - a1c 9.3, glucose wnl inpatient     =====Infectious Disease=====  #COVID   - s/p paxlovid and 1 dose remdesivir  # PNA  - CT chest showing ground glass opacities  - s/p zosyn  - intermittent episodes of fevers, likely source GI given choleycystitis? culture NGTD  - meropenem 5d course thru 2/1   - now monitoring off antibiotics    =====Heme/Onc=====  #DVT Ppx  - heparin sub-q    =====Ethics=====  FULL CODE.  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     PLAN  =====Neurologic=====  #CVA  - prior CVA x3 with no residual deficits    #myoclonic jerks  - on Keppra 500 mg BID, per neuro wishing to wean however per family request will continue at 500 mg BID, but now off valproate per neuro    - also per neuro, use precedex as needed for clinical sedation   --2/10, OK to start propofol as trach planned for 2/12    - holding home  gabapentin and memantine in setting of somnolence  - continue lexapro     #AMS  - CT/CTA negative for stroke. EEG now off   - MRI brain - unremarkable findings. Family now amenable to Trach/PEG. Will arrange Trach next week.       =====Pulmonary=====  #Prior aspiration PNA  - s/p zosyn for aspiration PNA from 1/20- 1/28     #COVID  - s/p paxlovid and 1 dose remdesivir while inpatient    #Pleural effusions b/l  - CT chest from 1/16 showing new small bilateral pleural effusions/ atelectasis/ patchy bilateral ground-glass opacities are consistent with pulmonary edema.  - Diuresis PRN based on amount of pleural effusions on POCUS    #AHRF  - Intubated for airway protection in s/o AMS  - Holding brilinta for possible procedure. Will need to restart ASAP after trach/PEG  --trach tentatively for Monday 2/12    =====Cardiovascular=====  Normotensive, not requiring pressors or midodrine.     #HTN  - on home amlodipine and metoprolol currently holding     #CAD  - on Brilinta (holding) aspirin and ranolazine continue   - as per vascular okay to hold Brilinta for possible pleural effusion thoracentesis  -have not heard back from cardiology regarding Brilinta / last stent 2022 , will hold   -Need to restart brilinta ASAP after Trach/PEG    #Afib  - pt with known history of pAF, first observed 2/1  - can consider starting AC and/or rate control if persists     =====GI=====  #upper GI bleed  - s/p EGD showing dieulafoy lesion and esophagitis likely 2/2 NGT irritation s/p 2 clips  - on protonix IV BID continue   - CT on 1/25 with cholelithiasis and sludge HIDA on 1/26 confirming likely acute choley, s/p IR perc cholecystostomy 1/26   - Discussed w/ family, want trach/PEG. Discussed w/ GI - Only will do PEG 24-48 hours after trach placed. Recommend reaching out when trach date is set  - Trach on 2/12, will need to e-mail GI after trach placed. Informed GI of date on 2/9    #Diet  - tube feeds NGT   - NPO MN on 2/11 for ?trach 2/12    =====Renal/=====  #Electrolytes  - Maintain K>4, Phos>3, Mag>2, iCal>1  - baseline cr 1.5, at baseline  - on free water flushes for hyperNa. Trend BMP/Na   - Diuresis PRN    Trial of Void as of 2/9    =====Endocrine=====  #DM2  - on lantus 12U and q6 SSI  - a1c 9.3, glucose wnl inpatient     =====Infectious Disease=====  #COVID   - s/p paxlovid and 1 dose remdesivir  # PNA  - CT chest showing ground glass opacities  - s/p zosyn  - intermittent episodes of fevers, likely source GI given choleycystitis? culture NGTD  - meropenem 5d course thru 2/1   - now monitoring off antibiotics    =====Heme/Onc=====  #DVT Ppx  - heparin sub-q    =====Ethics=====  FULL CODE.  After family discussion, Trach/PEG

## 2024-02-10 NOTE — PROGRESS NOTE ADULT - SUBJECTIVE AND OBJECTIVE BOX
Aashish Boyer MD  Interventional Cardiology / Advance Heart Failure and Cardiac Transplant Specialist  [place] Office : [phone] [place] [id number]  Tel:   [place] Office : [phone] [facility] [address]  Tel: [phone]      Subjective/Overnight events: Pt is lying in bed remains intubated in ICU	  MEDICATIONS:  aspirin  chewable 81 milliGRAM(s) Enteral Tube daily  doxazosin 2 milliGRAM(s) Oral at bedtime  heparin   Injectable 5000 Unit(s) SubCutaneous every 8 hours  midodrine 10 milliGRAM(s) Oral every 8 hours      albuterol/ipratropium for Nebulization 3 milliLiter(s) Nebulizer every 6 hours  sodium chloride 3%  Inhalation 4 milliLiter(s) Inhalation two times a day    dexMEDEtomidine Infusion 0.2 MICROgram(s)/kG/Hr IV Continuous <Continuous>  escitalopram 10 milliGRAM(s) Oral daily  levETIRAcetam  IVPB 500 milliGRAM(s) IV Intermittent every 12 hours  propofol Infusion 10 MICROgram(s)/kG/Min IV Continuous <Continuous>    pantoprazole  Injectable 40 milliGRAM(s) IV Push every 12 hours  sucralfate suspension 1 Gram(s) Enteral Tube two times a day    dextrose 50% Injectable 25 Gram(s) IV Push once  dextrose 50% Injectable 25 Gram(s) IV Push once  dextrose 50% Injectable 12.5 Gram(s) IV Push once  dextrose Oral Gel 15 Gram(s) Oral once PRN  glucagon  Injectable 1 milliGRAM(s) IntraMuscular once  insulin glargine Injectable (LANTUS) 12 Unit(s) SubCutaneous <User Schedule>  insulin lispro (ADMELOG) corrective regimen sliding scale   SubCutaneous every 6 hours    artificial  tears Solution 1 Drop(s) Left EYE four times a day  chlorhexidine 0.12% Liquid 15 milliLiter(s) Oral Mucosa every 12 hours  chlorhexidine 2% Cloths 1 Application(s) Topical daily  dextrose 5%. 1000 milliLiter(s) IV Continuous <Continuous>  dextrose 5%. 1000 milliLiter(s) IV Continuous <Continuous>  lidocaine   4% Patch 1 Patch Transdermal every 24 hours  petrolatum Ophthalmic Ointment 1 Application(s) Left EYE at bedtime      PAST MEDICAL/SURGICAL HISTORY  PAST MEDICAL & SURGICAL HISTORY:  Hyperlipemia      Hypertension      Coronary Artery Disease      Diabetes Mellitus Type II      Stented Coronary Artery  total 5 stents, last stent 5/2019      Neuropathy      Myocardial infarction      Stroke  mild left facial numbness   no other residuals verbalized      Myoclonic jerking      Stage 3 chronic kidney disease      History of Cataract Extraction      Hx of CABG      H/O coronary angiogram      S/P coronary artery stent placement  1/6/09      S/P placement of cardiac pacemaker          SOCIAL HISTORY: Substance Use (street drugs): ( x ) never used  (  ) other:    FAMILY HISTORY:  No pertinent family history in first degree relatives        PHYSICAL EXAM:  T(C): 36.7 (02-10-24 @ 08:00), Max: 37.2 (02-10-24 @ 00:00)  HR: 82 (02-10-24 @ 11:54) (74 - 90)  BP: 139/60 (02-10-24 @ 10:00) (110/66 - 142/66)  RR: 16 (02-10-24 @ 10:00) (12 - 20)  SpO2: 100% (02-10-24 @ 11:54) (97% - 100%)  Wt(kg): --  I&O's Summary    09 Feb 2024 07:01  -  10 Feb 2024 07:00  --------------------------------------------------------  IN: 2257.6 mL / OUT: 1345 mL / NET: 912.6 mL    10 Feb 2024 07:01  -  10 Feb 2024 12:59  --------------------------------------------------------  IN: 420.7 mL / OUT: 300 mL / NET: 120.7 mL          GENERAL: intubated  EYES:  conjunctiva and sclera clear  ENMT: No tonsillar erythema, exudates, or enlargement  Cardiovascular: Normal S1 S2, No JVD, No murmurs, No edema  Respiratory: Lungs clear to auscultation	  Gastrointestinal:  Soft  Extremities: No edema                                     7.1    6.41  )-----------( 285      ( 10 Feb 2024 02:40 )             22.9     02-10    138  |  100  |  30<H>  ----------------------------<  177<H>  4.5   |  29  |  1.42<H>    Ca    8.2<L>      10 Feb 2024 02:40  Phos  3.0     02-10  Mg     2.10     02-10    TPro  6.2  /  Alb  2.2<L>  /  TBili  0.2  /  DBili  x   /  AST  24  /  ALT  18  /  AlkPhos  112  02-10    proBNP:   Lipid Profile:   HgA1c:   TSH:     Consultant(s) Notes Reviewed:  [x ] YES  [ ] NO    Care Discussed with Consultants/Other Providers [ x] YES  [ ] NO    Imaging Personally Reviewed independently:  [x] YES  [ ] NO    All labs, radiologic studies, vitals, orders and medications list reviewed. Patient is seen and examined at bedside. Case discussed with medical team.

## 2024-02-10 NOTE — PROGRESS NOTE ADULT - SUBJECTIVE AND OBJECTIVE BOX
MICU PROGRESS NOTE    INTERVAL HPI/OVERNIGHT EVENTS:    SUBJECTIVE:   - no acute events overnight, intubated no interval history obtained.  - pending trach tentatively on monday, PEG as well.     OBJECTIVE:    VITAL SIGNS:  ICU Vital Signs Last 24 Hrs  T(C): 36.9 (10 Feb 2024 04:00), Max: 37.2 (10 Feb 2024 00:00)  T(F): 98.4 (10 Feb 2024 04:00), Max: 99 (10 Feb 2024 00:00)  HR: 78 (10 Feb 2024 07:33) (70 - 90)  BP: 133/53 (10 Feb 2024 06:00) (110/66 - 150/59)  BP(mean): 72 (10 Feb 2024 06:00) (69 - 86)  ABP: --  ABP(mean): --  RR: 14 (10 Feb 2024 06:00) (12 - 20)  SpO2: 98% (10 Feb 2024 07:33) (96% - 100%)    O2 Parameters below as of 10 Feb 2024 04:00  Patient On (Oxygen Delivery Method): ventilator    O2 Concentration (%): 30        VENTS:  Mode: AC/ CMV (Assist Control/ Continuous Mandatory Ventilation), RR (machine): 12, TV (machine): 450, FiO2: 30, PEEP: 5, ITime: 0.9, MAP: 10, PIP: 33    I&O:    02-09 @ 07:01  -  02-10 @ 07:00  --------------------------------------------------------  IN: 2200.7 mL / OUT: 1345 mL / NET: 855.7 mL        PHYSICAL EXAM:  GENERAL: NAD  HEAD:  Atraumatic, Normocephalic  EYES: EOMI, conjunctiva and sclera clear  CHEST/LUNG: Ventilator sounds  HEART: Regular rate and rhythm; No murmurs, rubs, or gallops  ABDOMEN: Soft, Nontender, Nondistended;   SKIN: No rashes or lesions, 1+ edema to b/l shins  NERVOUS SYSTEM: Alert & Oriented X0, opens eyes and looks around room                              MEDICATIONS:  MEDICATIONS  (STANDING):  albuterol/ipratropium for Nebulization 3 milliLiter(s) Nebulizer every 6 hours  artificial  tears Solution 1 Drop(s) Left EYE four times a day  aspirin  chewable 81 milliGRAM(s) Enteral Tube daily  chlorhexidine 0.12% Liquid 15 milliLiter(s) Oral Mucosa every 12 hours  chlorhexidine 2% Cloths 1 Application(s) Topical daily  dexMEDEtomidine Infusion 0.2 MICROgram(s)/kG/Hr (3.97 mL/Hr) IV Continuous <Continuous>  dextrose 5%. 1000 milliLiter(s) (50 mL/Hr) IV Continuous <Continuous>  dextrose 5%. 1000 milliLiter(s) (100 mL/Hr) IV Continuous <Continuous>  dextrose 50% Injectable 25 Gram(s) IV Push once  dextrose 50% Injectable 12.5 Gram(s) IV Push once  dextrose 50% Injectable 25 Gram(s) IV Push once  escitalopram 10 milliGRAM(s) Oral daily  glucagon  Injectable 1 milliGRAM(s) IntraMuscular once  heparin   Injectable 5000 Unit(s) SubCutaneous every 8 hours  insulin glargine Injectable (LANTUS) 12 Unit(s) SubCutaneous <User Schedule>  insulin lispro (ADMELOG) corrective regimen sliding scale   SubCutaneous every 6 hours  levETIRAcetam  IVPB 500 milliGRAM(s) IV Intermittent every 12 hours  lidocaine   4% Patch 1 Patch Transdermal every 24 hours  midodrine 10 milliGRAM(s) Oral every 8 hours  pantoprazole  Injectable 40 milliGRAM(s) IV Push every 12 hours  petrolatum Ophthalmic Ointment 1 Application(s) Left EYE at bedtime  sodium chloride 3%  Inhalation 4 milliLiter(s) Inhalation two times a day  sucralfate suspension 1 Gram(s) Enteral Tube two times a day    MEDICATIONS  (PRN):  dextrose Oral Gel 15 Gram(s) Oral once PRN Blood Glucose LESS THAN 70 milliGRAM(s)/deciliter      ALLERGIES:  Allergies    fluoroquinolone antibiotics (Other)  Cipro (Unknown)  Tegretol (Unknown)  carbamazepine (Other)    Intolerances        LABS:                        7.1    6.41  )-----------( 285      ( 10 Feb 2024 02:40 )             22.9     02-10    138  |  100  |  30<H>  ----------------------------<  177<H>  4.5   |  29  |  1.42<H>    Ca    8.2<L>      10 Feb 2024 02:40  Phos  3.0     02-10  Mg     2.10     02-10    TPro  6.2  /  Alb  2.2<L>  /  TBili  0.2  /  DBili  x   /  AST  24  /  ALT  18  /  AlkPhos  112  02-10    PT/INR - ( 10 Feb 2024 02:40 )   PT: 11.5 sec;   INR: 1.03 ratio         PTT - ( 10 Feb 2024 02:40 )  PTT:31.3 sec  Urinalysis Basic - ( 10 Feb 2024 02:40 )    Color: x / Appearance: x / SG: x / pH: x  Gluc: 177 mg/dL / Ketone: x  / Bili: x / Urobili: x   Blood: x / Protein: x / Nitrite: x   Leuk Esterase: x / RBC: x / WBC x   Sq Epi: x / Non Sq Epi: x / Bacteria: x        CAPILLARY BLOOD GLUCOSE      POCT Blood Glucose.: 173 mg/dL (10 Feb 2024 05:36)      RADIOLOGY & ADDITIONAL TESTS: Reviewed. MICU PROGRESS NOTE    INTERVAL HPI/OVERNIGHT EVENTS:    SUBJECTIVE:   - no acute events overnight, intubated no interval history obtained.  - pending trach tentatively on monday, PEG as well.   - per son at bedside, patient with diarrhea at 5am, and bilateral shoulder jerking.     OBJECTIVE:    VITAL SIGNS:  ICU Vital Signs Last 24 Hrs  T(C): 36.9 (10 Feb 2024 04:00), Max: 37.2 (10 Feb 2024 00:00)  T(F): 98.4 (10 Feb 2024 04:00), Max: 99 (10 Feb 2024 00:00)  HR: 78 (10 Feb 2024 07:33) (70 - 90)  BP: 133/53 (10 Feb 2024 06:00) (110/66 - 150/59)  BP(mean): 72 (10 Feb 2024 06:00) (69 - 86)  ABP: --  ABP(mean): --  RR: 14 (10 Feb 2024 06:00) (12 - 20)  SpO2: 98% (10 Feb 2024 07:33) (96% - 100%)    O2 Parameters below as of 10 Feb 2024 04:00  Patient On (Oxygen Delivery Method): ventilator    O2 Concentration (%): 30        VENTS:  Mode: AC/ CMV (Assist Control/ Continuous Mandatory Ventilation), RR (machine): 12, TV (machine): 450, FiO2: 30, PEEP: 5, ITime: 0.9, MAP: 10, PIP: 33    I&O:    02-09 @ 07:01  -  02-10 @ 07:00  --------------------------------------------------------  IN: 2200.7 mL / OUT: 1345 mL / NET: 855.7 mL        PHYSICAL EXAM:  GENERAL: NAD  HEAD:  Atraumatic, Normocephalic  EYES: EOMI, conjunctiva and sclera clear  CHEST/LUNG: Ventilator sounds  HEART: Regular rate and rhythm; No murmurs, rubs, or gallops  ABDOMEN: Soft, Nontender, Nondistended;   SKIN: No rashes or lesions, 1+ edema to b/l shins  NERVOUS SYSTEM: Alert & Oriented X0, opens eyes and looks around room                              MEDICATIONS:  MEDICATIONS  (STANDING):  albuterol/ipratropium for Nebulization 3 milliLiter(s) Nebulizer every 6 hours  artificial  tears Solution 1 Drop(s) Left EYE four times a day  aspirin  chewable 81 milliGRAM(s) Enteral Tube daily  chlorhexidine 0.12% Liquid 15 milliLiter(s) Oral Mucosa every 12 hours  chlorhexidine 2% Cloths 1 Application(s) Topical daily  dexMEDEtomidine Infusion 0.2 MICROgram(s)/kG/Hr (3.97 mL/Hr) IV Continuous <Continuous>  dextrose 5%. 1000 milliLiter(s) (50 mL/Hr) IV Continuous <Continuous>  dextrose 5%. 1000 milliLiter(s) (100 mL/Hr) IV Continuous <Continuous>  dextrose 50% Injectable 25 Gram(s) IV Push once  dextrose 50% Injectable 12.5 Gram(s) IV Push once  dextrose 50% Injectable 25 Gram(s) IV Push once  escitalopram 10 milliGRAM(s) Oral daily  glucagon  Injectable 1 milliGRAM(s) IntraMuscular once  heparin   Injectable 5000 Unit(s) SubCutaneous every 8 hours  insulin glargine Injectable (LANTUS) 12 Unit(s) SubCutaneous <User Schedule>  insulin lispro (ADMELOG) corrective regimen sliding scale   SubCutaneous every 6 hours  levETIRAcetam  IVPB 500 milliGRAM(s) IV Intermittent every 12 hours  lidocaine   4% Patch 1 Patch Transdermal every 24 hours  midodrine 10 milliGRAM(s) Oral every 8 hours  pantoprazole  Injectable 40 milliGRAM(s) IV Push every 12 hours  petrolatum Ophthalmic Ointment 1 Application(s) Left EYE at bedtime  sodium chloride 3%  Inhalation 4 milliLiter(s) Inhalation two times a day  sucralfate suspension 1 Gram(s) Enteral Tube two times a day    MEDICATIONS  (PRN):  dextrose Oral Gel 15 Gram(s) Oral once PRN Blood Glucose LESS THAN 70 milliGRAM(s)/deciliter      ALLERGIES:  Allergies    fluoroquinolone antibiotics (Other)  Cipro (Unknown)  Tegretol (Unknown)  carbamazepine (Other)    Intolerances        LABS:                        7.1    6.41  )-----------( 285      ( 10 Feb 2024 02:40 )             22.9     02-10    138  |  100  |  30<H>  ----------------------------<  177<H>  4.5   |  29  |  1.42<H>    Ca    8.2<L>      10 Feb 2024 02:40  Phos  3.0     02-10  Mg     2.10     02-10    TPro  6.2  /  Alb  2.2<L>  /  TBili  0.2  /  DBili  x   /  AST  24  /  ALT  18  /  AlkPhos  112  02-10    PT/INR - ( 10 Feb 2024 02:40 )   PT: 11.5 sec;   INR: 1.03 ratio         PTT - ( 10 Feb 2024 02:40 )  PTT:31.3 sec  Urinalysis Basic - ( 10 Feb 2024 02:40 )    Color: x / Appearance: x / SG: x / pH: x  Gluc: 177 mg/dL / Ketone: x  / Bili: x / Urobili: x   Blood: x / Protein: x / Nitrite: x   Leuk Esterase: x / RBC: x / WBC x   Sq Epi: x / Non Sq Epi: x / Bacteria: x        CAPILLARY BLOOD GLUCOSE      POCT Blood Glucose.: 173 mg/dL (10 Feb 2024 05:36)      RADIOLOGY & ADDITIONAL TESTS: Reviewed.

## 2024-02-10 NOTE — PROGRESS NOTE ADULT - ASSESSMENT
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) who presents to the hospital for COVID19 infection and chest pain  MABLE - Renal fxn improving  Hypophosphatemia- acceptable  Hypokalemia -  resolved  Hypertensive urgency -resolved -BP acceptable at present  Pulm - resp failure - on bipap   s/p EEG  - eval noted  Hypernatremia - Na+ much improved  on free water prescribed via NGT    1 Renal - scr stable at present,  no objection to lasix 40mg IV PRN   continue to trend phos level daily   cont with free water flushes 250 ml q4h  2 CV - BP acceptable at present  3 Vasc - s/p SMA stent placement;   4 Sx - s/p HIDA + for acute asa, medical management  5 GI - s/p EGD clipping of bleeding duodenal ulcers , PEG likely coming week   6 Anemia- , continue to trend H/H; suggest PRBC for Hgb <7.0; transfuse per primary team   7 Pulm -vent as per icu , trach pending  8- ID -afebrile   9 Glycemic control as able       Elsa Nunez  [facility]  Office: [phone]   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) who presents to the hospital for COVID19 infection and chest pain  MABLE - Renal fxn improving  Hypophosphatemia- acceptable  Hypokalemia -  resolved  Hypertensive urgency -resolved -BP acceptable at present  Pulm - resp failure   s/p EEG  - eval noted  Hypernatremia - Na+ much improved  on free water prescribed via NGT    1 Renal - scr stable at present,  no objection to lasix 40mg IV PRN   continue to trend phos level daily   cont with free water flushes 250 ml q4h  2 CV - BP acceptable at present  3 Vasc - s/p SMA stent placement;   4 Sx - s/p HIDA + for acute asa, medical management  5 GI - s/p EGD clipping of bleeding duodenal ulcers , PEG pending  6 Anemia- , continue to trend H/H; suggest PRBC for Hgb <7.0; transfuse per primary team   7 Pulm -vent as per icu , trach for Monday  8- ID -afebrile   9 Glycemic control as able       Maggy Laughlin NP  [facility]  Office: [phone]

## 2024-02-10 NOTE — PROGRESS NOTE ADULT - SUBJECTIVE AND OBJECTIVE BOX
Nephrology Progress Note    Patient is a 89y male with    Allergies:  fluoroquinolone antibiotics (Other)  Cipro (Unknown)  Tegretol (Unknown)  carbamazepine (Other)    Hospital Medications:   MEDICATIONS  (STANDING):  albuterol/ipratropium for Nebulization 3 milliLiter(s) Nebulizer every 6 hours  artificial  tears Solution 1 Drop(s) Left EYE four times a day  aspirin  chewable 81 milliGRAM(s) Enteral Tube daily  chlorhexidine 0.12% Liquid 15 milliLiter(s) Oral Mucosa every 12 hours  chlorhexidine 2% Cloths 1 Application(s) Topical daily  dexMEDEtomidine Infusion 0.2 MICROgram(s)/kG/Hr (3.97 mL/Hr) IV Continuous <Continuous>  dextrose 5%. 1000 milliLiter(s) (50 mL/Hr) IV Continuous <Continuous>  dextrose 5%. 1000 milliLiter(s) (100 mL/Hr) IV Continuous <Continuous>  dextrose 50% Injectable 25 Gram(s) IV Push once  dextrose 50% Injectable 25 Gram(s) IV Push once  dextrose 50% Injectable 12.5 Gram(s) IV Push once  doxazosin 2 milliGRAM(s) Oral at bedtime  escitalopram 10 milliGRAM(s) Oral daily  glucagon  Injectable 1 milliGRAM(s) IntraMuscular once  heparin   Injectable 5000 Unit(s) SubCutaneous every 8 hours  insulin glargine Injectable (LANTUS) 12 Unit(s) SubCutaneous <User Schedule>  insulin lispro (ADMELOG) corrective regimen sliding scale   SubCutaneous every 6 hours  levETIRAcetam  IVPB 500 milliGRAM(s) IV Intermittent every 12 hours  lidocaine   4% Patch 1 Patch Transdermal every 24 hours  midodrine 10 milliGRAM(s) Oral every 8 hours  pantoprazole  Injectable 40 milliGRAM(s) IV Push every 12 hours  petrolatum Ophthalmic Ointment 1 Application(s) Left EYE at bedtime  propofol Infusion 10 MICROgram(s)/kG/Min (4.76 mL/Hr) IV Continuous <Continuous>  sodium chloride 3%  Inhalation 4 milliLiter(s) Inhalation two times a day  sucralfate suspension 1 Gram(s) Enteral Tube two times a day      VITALS:  T(F): 98.1 (02-10-24 @ 08:00), Max: 99 (02-10-24 @ 00:00)  HR: 82 (02-10-24 @ 11:54)  BP: 139/60 (02-10-24 @ 10:00)  RR: 16 (02-10-24 @ 10:00)  SpO2: 100% (02-10-24 @ 11:54)      02-08 @ 07:01  -  02-09 @ 07:00  --------------------------------------------------------  IN: 1992 mL / OUT: 2360 mL / NET: -368 mL    02-09 @ 07:01  -  02-10 @ 07:00  --------------------------------------------------------  IN: 2257.6 mL / OUT: 1345 mL / NET: 912.6 mL    02-10 @ 07:01  -  02-10 @ 14:55  --------------------------------------------------------  IN: 420.7 mL / OUT: 300 mL / NET: 120.7 mL        PHYSICAL EXAM:  Constitutional: NAD  Neck: No JVD  Respiratory: intubated  Cardiovascular: S1, S2, RRR  Gastrointestinal: BS+, soft, NT/ND  Extremities:+ peripheral edema  : +delong.   Skin: No rashes      LABS:  02-10    138  |  100  |  30<H>  ----------------------------<  177<H>  4.5   |  29  |  1.42<H>    Ca    8.2<L>      10 Feb 2024 02:40  Phos  3.0     02-10  Mg     2.10     02-10    TPro  6.2  /  Alb  2.2<L>  /  TBili  0.2  /  DBili      /  AST  24  /  ALT  18  /  AlkPhos  112  02-10                          7.1    6.41  )-----------( 285      ( 10 Feb 2024 02:40 )             22.9             RADIOLOGY & ADDITIONAL STUDIES:    CXR:2/9/2023  FINDINGS:  The endotracheal and enteric tube in addition to the pacemaker and   sternotomy wires are unchanged.  Diffuse groundglass opacities unchanged likely pulmonary edema.  Heart is not enlarged and stable in size.  No obvious effusions or pneumothorax.    IMPRESSION: Pulmonary edema with endotracheal tube.    --- End of Report ---   Nephrology Progress Note    Patient is a 89y male in ICU on VentPATTI at bedside    Allergies:  fluoroquinolone antibiotics (Other)  Cipro (Unknown)  Tegretol (Unknown)  carbamazepine (Other)    Hospital Medications:   MEDICATIONS  (STANDING):  albuterol/ipratropium for Nebulization 3 milliLiter(s) Nebulizer every 6 hours  artificial  tears Solution 1 Drop(s) Left EYE four times a day  aspirin  chewable 81 milliGRAM(s) Enteral Tube daily  chlorhexidine 0.12% Liquid 15 milliLiter(s) Oral Mucosa every 12 hours  chlorhexidine 2% Cloths 1 Application(s) Topical daily  dexMEDEtomidine Infusion 0.2 MICROgram(s)/kG/Hr (3.97 mL/Hr) IV Continuous <Continuous>  dextrose 5%. 1000 milliLiter(s) (50 mL/Hr) IV Continuous <Continuous>  dextrose 5%. 1000 milliLiter(s) (100 mL/Hr) IV Continuous <Continuous>  dextrose 50% Injectable 25 Gram(s) IV Push once  dextrose 50% Injectable 25 Gram(s) IV Push once  dextrose 50% Injectable 12.5 Gram(s) IV Push once  doxazosin 2 milliGRAM(s) Oral at bedtime  escitalopram 10 milliGRAM(s) Oral daily  glucagon  Injectable 1 milliGRAM(s) IntraMuscular once  heparin   Injectable 5000 Unit(s) SubCutaneous every 8 hours  insulin glargine Injectable (LANTUS) 12 Unit(s) SubCutaneous <User Schedule>  insulin lispro (ADMELOG) corrective regimen sliding scale   SubCutaneous every 6 hours  levETIRAcetam  IVPB 500 milliGRAM(s) IV Intermittent every 12 hours  lidocaine   4% Patch 1 Patch Transdermal every 24 hours  midodrine 10 milliGRAM(s) Oral every 8 hours  pantoprazole  Injectable 40 milliGRAM(s) IV Push every 12 hours  petrolatum Ophthalmic Ointment 1 Application(s) Left EYE at bedtime  propofol Infusion 10 MICROgram(s)/kG/Min (4.76 mL/Hr) IV Continuous <Continuous>  sodium chloride 3%  Inhalation 4 milliLiter(s) Inhalation two times a day  sucralfate suspension 1 Gram(s) Enteral Tube two times a day      VITALS:  T(F): 98.1 (02-10-24 @ 08:00), Max: 99 (02-10-24 @ 00:00)  HR: 82 (02-10-24 @ 11:54)  BP: 139/60 (02-10-24 @ 10:00)  RR: 16 (02-10-24 @ 10:00)  SpO2: 100% (02-10-24 @ 11:54)      02-08 @ 07:01  -  02-09 @ 07:00  --------------------------------------------------------  IN: 1992 mL / OUT: 2360 mL / NET: -368 mL    02-09 @ 07:01  -  02-10 @ 07:00  --------------------------------------------------------  IN: 2257.6 mL / OUT: 1345 mL / NET: 912.6 mL    02-10 @ 07:01  -  02-10 @ 14:55  --------------------------------------------------------  IN: 420.7 mL / OUT: 300 mL / NET: 120.7 mL        PHYSICAL EXAM:  Constitutional: NAD  Neck: No JVD  Respiratory: intubated  Cardiovascular: S1, S2, RRR  Gastrointestinal: BS+, soft, NT/ND  Extremities:+ peripheral edema  : +delong.   Skin: No rashes      LABS:  02-10    138  |  100  |  30<H>  ----------------------------<  177<H>  4.5   |  29  |  1.42<H>    Ca    8.2<L>      10 Feb 2024 02:40  Phos  3.0     02-10  Mg     2.10     02-10    TPro  6.2  /  Alb  2.2<L>  /  TBili  0.2  /  DBili      /  AST  24  /  ALT  18  /  AlkPhos  112  02-10                          7.1    6.41  )-----------( 285      ( 10 Feb 2024 02:40 )             22.9             RADIOLOGY & ADDITIONAL STUDIES:    CXR:2/9/2023  FINDINGS:  The endotracheal and enteric tube in addition to the pacemaker and   sternotomy wires are unchanged.  Diffuse groundglass opacities unchanged likely pulmonary edema.  Heart is not enlarged and stable in size.  No obvious effusions or pneumothorax.    IMPRESSION: Pulmonary edema with endotracheal tube.    --- End of Report ---   maximum assist (25% patients effort)

## 2024-02-11 LAB
ALBUMIN SERPL ELPH-MCNC: 2.3 G/DL — LOW (ref 3.3–5)
ALP SERPL-CCNC: 81 U/L — SIGNIFICANT CHANGE UP (ref 40–120)
ALT FLD-CCNC: 20 U/L — SIGNIFICANT CHANGE UP (ref 4–41)
ANION GAP SERPL CALC-SCNC: 9 MMOL/L — SIGNIFICANT CHANGE UP (ref 7–14)
APPEARANCE UR: CLEAR — SIGNIFICANT CHANGE UP
APTT BLD: 30.8 SEC — SIGNIFICANT CHANGE UP (ref 24.5–35.6)
AST SERPL-CCNC: 26 U/L — SIGNIFICANT CHANGE UP (ref 4–40)
BASE EXCESS BLDV CALC-SCNC: 5.8 MMOL/L — HIGH (ref -2–3)
BASE EXCESS BLDV CALC-SCNC: 5.9 MMOL/L — HIGH (ref -2–3)
BASOPHILS # BLD AUTO: 0.04 K/UL — SIGNIFICANT CHANGE UP (ref 0–0.2)
BASOPHILS NFR BLD AUTO: 0.5 % — SIGNIFICANT CHANGE UP (ref 0–2)
BILIRUB SERPL-MCNC: 0.3 MG/DL — SIGNIFICANT CHANGE UP (ref 0.2–1.2)
BILIRUB UR-MCNC: NEGATIVE — SIGNIFICANT CHANGE UP
BLD GP AB SCN SERPL QL: NEGATIVE — SIGNIFICANT CHANGE UP
BLOOD GAS VENOUS COMPREHENSIVE RESULT: SIGNIFICANT CHANGE UP
BLOOD GAS VENOUS COMPREHENSIVE RESULT: SIGNIFICANT CHANGE UP
BUN SERPL-MCNC: 27 MG/DL — HIGH (ref 7–23)
CALCIUM SERPL-MCNC: 8.3 MG/DL — LOW (ref 8.4–10.5)
CHLORIDE BLDV-SCNC: 101 MMOL/L — SIGNIFICANT CHANGE UP (ref 96–108)
CHLORIDE BLDV-SCNC: 105 MMOL/L — SIGNIFICANT CHANGE UP (ref 96–108)
CHLORIDE SERPL-SCNC: 102 MMOL/L — SIGNIFICANT CHANGE UP (ref 98–107)
CO2 BLDV-SCNC: 32 MMOL/L — HIGH (ref 22–26)
CO2 BLDV-SCNC: 32 MMOL/L — HIGH (ref 22–26)
CO2 SERPL-SCNC: 27 MMOL/L — SIGNIFICANT CHANGE UP (ref 22–31)
COLOR SPEC: YELLOW — SIGNIFICANT CHANGE UP
CREAT SERPL-MCNC: 1.29 MG/DL — SIGNIFICANT CHANGE UP (ref 0.5–1.3)
DIFF PNL FLD: ABNORMAL
EGFR: 53 ML/MIN/1.73M2 — LOW
EOSINOPHIL # BLD AUTO: 0.37 K/UL — SIGNIFICANT CHANGE UP (ref 0–0.5)
EOSINOPHIL NFR BLD AUTO: 4.4 % — SIGNIFICANT CHANGE UP (ref 0–6)
GAS PNL BLDV: 133 MMOL/L — LOW (ref 136–145)
GAS PNL BLDV: 136 MMOL/L — SIGNIFICANT CHANGE UP (ref 136–145)
GLUCOSE BLDC GLUCOMTR-MCNC: 174 MG/DL — HIGH (ref 70–99)
GLUCOSE BLDC GLUCOMTR-MCNC: 179 MG/DL — HIGH (ref 70–99)
GLUCOSE BLDC GLUCOMTR-MCNC: 186 MG/DL — HIGH (ref 70–99)
GLUCOSE BLDC GLUCOMTR-MCNC: 187 MG/DL — HIGH (ref 70–99)
GLUCOSE BLDV-MCNC: 123 MG/DL — HIGH (ref 70–99)
GLUCOSE BLDV-MCNC: 173 MG/DL — HIGH (ref 70–99)
GLUCOSE SERPL-MCNC: 121 MG/DL — HIGH (ref 70–99)
GLUCOSE UR QL: NEGATIVE MG/DL — SIGNIFICANT CHANGE UP
HCO3 BLDV-SCNC: 31 MMOL/L — HIGH (ref 22–29)
HCO3 BLDV-SCNC: 31 MMOL/L — HIGH (ref 22–29)
HCT VFR BLD CALC: 22.4 % — LOW (ref 39–50)
HCT VFR BLD CALC: 24.1 % — LOW (ref 39–50)
HCT VFR BLDA CALC: 21 % — CRITICAL LOW (ref 39–51)
HCT VFR BLDA CALC: 24 % — LOW (ref 39–51)
HGB BLD CALC-MCNC: 7.1 G/DL — LOW (ref 12.6–17.4)
HGB BLD CALC-MCNC: 8 G/DL — LOW (ref 12.6–17.4)
HGB BLD-MCNC: 7.1 G/DL — LOW (ref 13–17)
HGB BLD-MCNC: 7.8 G/DL — LOW (ref 13–17)
IANC: 5.2 K/UL — SIGNIFICANT CHANGE UP (ref 1.8–7.4)
IMM GRANULOCYTES NFR BLD AUTO: 1.2 % — HIGH (ref 0–0.9)
INR BLD: 1.01 RATIO — SIGNIFICANT CHANGE UP (ref 0.85–1.18)
KETONES UR-MCNC: NEGATIVE MG/DL — SIGNIFICANT CHANGE UP
LACTATE BLDV-MCNC: 1.4 MMOL/L — SIGNIFICANT CHANGE UP (ref 0.5–2)
LACTATE BLDV-MCNC: 2.7 MMOL/L — HIGH (ref 0.5–2)
LEUKOCYTE ESTERASE UR-ACNC: ABNORMAL
LYMPHOCYTES # BLD AUTO: 1.73 K/UL — SIGNIFICANT CHANGE UP (ref 1–3.3)
LYMPHOCYTES # BLD AUTO: 20.7 % — SIGNIFICANT CHANGE UP (ref 13–44)
MAGNESIUM SERPL-MCNC: 2.1 MG/DL — SIGNIFICANT CHANGE UP (ref 1.6–2.6)
MCHC RBC-ENTMCNC: 29.5 PG — SIGNIFICANT CHANGE UP (ref 27–34)
MCHC RBC-ENTMCNC: 29.9 PG — SIGNIFICANT CHANGE UP (ref 27–34)
MCHC RBC-ENTMCNC: 31.7 GM/DL — LOW (ref 32–36)
MCHC RBC-ENTMCNC: 32.4 GM/DL — SIGNIFICANT CHANGE UP (ref 32–36)
MCV RBC AUTO: 92.3 FL — SIGNIFICANT CHANGE UP (ref 80–100)
MCV RBC AUTO: 92.9 FL — SIGNIFICANT CHANGE UP (ref 80–100)
MONOCYTES # BLD AUTO: 0.91 K/UL — HIGH (ref 0–0.9)
MONOCYTES NFR BLD AUTO: 10.9 % — SIGNIFICANT CHANGE UP (ref 2–14)
NEUTROPHILS # BLD AUTO: 5.2 K/UL — SIGNIFICANT CHANGE UP (ref 1.8–7.4)
NEUTROPHILS NFR BLD AUTO: 62.3 % — SIGNIFICANT CHANGE UP (ref 43–77)
NITRITE UR-MCNC: NEGATIVE — SIGNIFICANT CHANGE UP
NRBC # BLD: 0 /100 WBCS — SIGNIFICANT CHANGE UP (ref 0–0)
NRBC # BLD: 0 /100 WBCS — SIGNIFICANT CHANGE UP (ref 0–0)
NRBC # FLD: 0 K/UL — SIGNIFICANT CHANGE UP (ref 0–0)
NRBC # FLD: 0 K/UL — SIGNIFICANT CHANGE UP (ref 0–0)
PCO2 BLDV: 45 MMHG — SIGNIFICANT CHANGE UP (ref 42–55)
PCO2 BLDV: 45 MMHG — SIGNIFICANT CHANGE UP (ref 42–55)
PH BLDV: 7.44 — HIGH (ref 7.32–7.43)
PH BLDV: 7.44 — HIGH (ref 7.32–7.43)
PH UR: 7 — SIGNIFICANT CHANGE UP (ref 5–8)
PHOSPHATE SERPL-MCNC: 3.1 MG/DL — SIGNIFICANT CHANGE UP (ref 2.5–4.5)
PLATELET # BLD AUTO: 294 K/UL — SIGNIFICANT CHANGE UP (ref 150–400)
PLATELET # BLD AUTO: 369 K/UL — SIGNIFICANT CHANGE UP (ref 150–400)
PO2 BLDV: 40 MMHG — SIGNIFICANT CHANGE UP (ref 25–45)
PO2 BLDV: 63 MMHG — HIGH (ref 25–45)
POTASSIUM BLDV-SCNC: 3.9 MMOL/L — SIGNIFICANT CHANGE UP (ref 3.5–5.1)
POTASSIUM BLDV-SCNC: 4.8 MMOL/L — SIGNIFICANT CHANGE UP (ref 3.5–5.1)
POTASSIUM SERPL-MCNC: 4 MMOL/L — SIGNIFICANT CHANGE UP (ref 3.5–5.3)
POTASSIUM SERPL-SCNC: 4 MMOL/L — SIGNIFICANT CHANGE UP (ref 3.5–5.3)
PROT SERPL-MCNC: 6.1 G/DL — SIGNIFICANT CHANGE UP (ref 6–8.3)
PROT UR-MCNC: NEGATIVE MG/DL — SIGNIFICANT CHANGE UP
PROTHROM AB SERPL-ACNC: 11.3 SEC — SIGNIFICANT CHANGE UP (ref 9.5–13)
RBC # BLD: 2.41 M/UL — LOW (ref 4.2–5.8)
RBC # BLD: 2.61 M/UL — LOW (ref 4.2–5.8)
RBC # FLD: 17.7 % — HIGH (ref 10.3–14.5)
RBC # FLD: 18 % — HIGH (ref 10.3–14.5)
RH IG SCN BLD-IMP: POSITIVE — SIGNIFICANT CHANGE UP
SAO2 % BLDV: 76.1 % — SIGNIFICANT CHANGE UP (ref 67–88)
SAO2 % BLDV: 94.7 % — HIGH (ref 67–88)
SODIUM SERPL-SCNC: 138 MMOL/L — SIGNIFICANT CHANGE UP (ref 135–145)
SP GR SPEC: 1.01 — SIGNIFICANT CHANGE UP (ref 1–1.03)
UROBILINOGEN FLD QL: 0.2 MG/DL — SIGNIFICANT CHANGE UP (ref 0.2–1)
WBC # BLD: 5.57 K/UL — SIGNIFICANT CHANGE UP (ref 3.8–10.5)
WBC # BLD: 8.35 K/UL — SIGNIFICANT CHANGE UP (ref 3.8–10.5)
WBC # FLD AUTO: 5.57 K/UL — SIGNIFICANT CHANGE UP (ref 3.8–10.5)
WBC # FLD AUTO: 8.35 K/UL — SIGNIFICANT CHANGE UP (ref 3.8–10.5)

## 2024-02-11 PROCEDURE — 71045 X-RAY EXAM CHEST 1 VIEW: CPT | Mod: 26

## 2024-02-11 PROCEDURE — 99291 CRITICAL CARE FIRST HOUR: CPT

## 2024-02-11 RX ORDER — PIPERACILLIN AND TAZOBACTAM 4; .5 G/20ML; G/20ML
3.38 INJECTION, POWDER, LYOPHILIZED, FOR SOLUTION INTRAVENOUS ONCE
Refills: 0 | Status: COMPLETED | OUTPATIENT
Start: 2024-02-11 | End: 2024-02-11

## 2024-02-11 RX ORDER — NOREPINEPHRINE BITARTRATE/D5W 8 MG/250ML
0.05 PLASTIC BAG, INJECTION (ML) INTRAVENOUS
Qty: 8 | Refills: 0 | Status: DISCONTINUED | OUTPATIENT
Start: 2024-02-11 | End: 2024-02-11

## 2024-02-11 RX ORDER — PIPERACILLIN AND TAZOBACTAM 4; .5 G/20ML; G/20ML
3.38 INJECTION, POWDER, LYOPHILIZED, FOR SOLUTION INTRAVENOUS ONCE
Refills: 0 | Status: DISCONTINUED | OUTPATIENT
Start: 2024-02-12 | End: 2024-02-12

## 2024-02-11 RX ORDER — ACETAMINOPHEN 500 MG
1000 TABLET ORAL ONCE
Refills: 0 | Status: COMPLETED | OUTPATIENT
Start: 2024-02-11 | End: 2024-02-11

## 2024-02-11 RX ORDER — NOREPINEPHRINE BITARTRATE/D5W 8 MG/250ML
0.05 PLASTIC BAG, INJECTION (ML) INTRAVENOUS
Qty: 16 | Refills: 0 | Status: DISCONTINUED | OUTPATIENT
Start: 2024-02-11 | End: 2024-02-14

## 2024-02-11 RX ORDER — PIPERACILLIN AND TAZOBACTAM 4; .5 G/20ML; G/20ML
3.38 INJECTION, POWDER, LYOPHILIZED, FOR SOLUTION INTRAVENOUS ONCE
Refills: 0 | Status: COMPLETED | OUTPATIENT
Start: 2024-02-12 | End: 2024-02-12

## 2024-02-11 RX ORDER — PIPERACILLIN AND TAZOBACTAM 4; .5 G/20ML; G/20ML
3.38 INJECTION, POWDER, LYOPHILIZED, FOR SOLUTION INTRAVENOUS EVERY 8 HOURS
Refills: 0 | Status: DISCONTINUED | OUTPATIENT
Start: 2024-02-12 | End: 2024-02-12

## 2024-02-11 RX ORDER — FUROSEMIDE 40 MG
40 TABLET ORAL ONCE
Refills: 0 | Status: COMPLETED | OUTPATIENT
Start: 2024-02-11 | End: 2024-02-11

## 2024-02-11 RX ORDER — ACETAMINOPHEN 500 MG
1000 TABLET ORAL ONCE
Refills: 0 | Status: DISCONTINUED | OUTPATIENT
Start: 2024-02-11 | End: 2024-02-15

## 2024-02-11 RX ADMIN — Medication 2: at 23:43

## 2024-02-11 RX ADMIN — Medication 81 MILLIGRAM(S): at 12:18

## 2024-02-11 RX ADMIN — ESCITALOPRAM OXALATE 10 MILLIGRAM(S): 10 TABLET, FILM COATED ORAL at 12:17

## 2024-02-11 RX ADMIN — PIPERACILLIN AND TAZOBACTAM 200 GRAM(S): 4; .5 INJECTION, POWDER, LYOPHILIZED, FOR SOLUTION INTRAVENOUS at 22:19

## 2024-02-11 RX ADMIN — Medication 2: at 12:19

## 2024-02-11 RX ADMIN — Medication 1000 MILLIGRAM(S): at 23:52

## 2024-02-11 RX ADMIN — LEVETIRACETAM 400 MILLIGRAM(S): 250 TABLET, FILM COATED ORAL at 17:40

## 2024-02-11 RX ADMIN — Medication 1 DROP(S): at 12:18

## 2024-02-11 RX ADMIN — PANTOPRAZOLE SODIUM 40 MILLIGRAM(S): 20 TABLET, DELAYED RELEASE ORAL at 17:41

## 2024-02-11 RX ADMIN — PANTOPRAZOLE SODIUM 40 MILLIGRAM(S): 20 TABLET, DELAYED RELEASE ORAL at 05:16

## 2024-02-11 RX ADMIN — Medication 1 DROP(S): at 23:50

## 2024-02-11 RX ADMIN — Medication 1 GRAM(S): at 17:41

## 2024-02-11 RX ADMIN — SODIUM CHLORIDE 4 MILLILITER(S): 9 INJECTION INTRAMUSCULAR; INTRAVENOUS; SUBCUTANEOUS at 10:10

## 2024-02-11 RX ADMIN — LIDOCAINE 1 PATCH: 4 CREAM TOPICAL at 07:23

## 2024-02-11 RX ADMIN — Medication 2: at 05:19

## 2024-02-11 RX ADMIN — HEPARIN SODIUM 5000 UNIT(S): 5000 INJECTION INTRAVENOUS; SUBCUTANEOUS at 22:46

## 2024-02-11 RX ADMIN — CHLORHEXIDINE GLUCONATE 1 APPLICATION(S): 213 SOLUTION TOPICAL at 12:18

## 2024-02-11 RX ADMIN — Medication 3 MILLILITER(S): at 03:31

## 2024-02-11 RX ADMIN — HEPARIN SODIUM 5000 UNIT(S): 5000 INJECTION INTRAVENOUS; SUBCUTANEOUS at 15:32

## 2024-02-11 RX ADMIN — INSULIN GLARGINE 12 UNIT(S): 100 INJECTION, SOLUTION SUBCUTANEOUS at 12:20

## 2024-02-11 RX ADMIN — DEXMEDETOMIDINE HYDROCHLORIDE IN 0.9% SODIUM CHLORIDE 3.97 MICROGRAM(S)/KG/HR: 4 INJECTION INTRAVENOUS at 07:41

## 2024-02-11 RX ADMIN — CHLORHEXIDINE GLUCONATE 15 MILLILITER(S): 213 SOLUTION TOPICAL at 06:21

## 2024-02-11 RX ADMIN — SODIUM CHLORIDE 4 MILLILITER(S): 9 INJECTION INTRAMUSCULAR; INTRAVENOUS; SUBCUTANEOUS at 18:37

## 2024-02-11 RX ADMIN — Medication 40 MILLIGRAM(S): at 09:35

## 2024-02-11 RX ADMIN — Medication 1 APPLICATION(S): at 22:46

## 2024-02-11 RX ADMIN — Medication 3 MILLILITER(S): at 18:37

## 2024-02-11 RX ADMIN — Medication 2 MILLIGRAM(S): at 22:46

## 2024-02-11 RX ADMIN — Medication 2: at 17:41

## 2024-02-11 RX ADMIN — Medication 1 DROP(S): at 17:41

## 2024-02-11 RX ADMIN — Medication 3 MILLILITER(S): at 10:10

## 2024-02-11 RX ADMIN — Medication 1 DROP(S): at 05:18

## 2024-02-11 RX ADMIN — LEVETIRACETAM 400 MILLIGRAM(S): 250 TABLET, FILM COATED ORAL at 05:13

## 2024-02-11 RX ADMIN — CHLORHEXIDINE GLUCONATE 15 MILLILITER(S): 213 SOLUTION TOPICAL at 17:40

## 2024-02-11 RX ADMIN — Medication 3 MILLILITER(S): at 16:09

## 2024-02-11 RX ADMIN — Medication 1 GRAM(S): at 05:16

## 2024-02-11 RX ADMIN — PROPOFOL 4.76 MICROGRAM(S)/KG/MIN: 10 INJECTION, EMULSION INTRAVENOUS at 07:41

## 2024-02-11 RX ADMIN — HEPARIN SODIUM 5000 UNIT(S): 5000 INJECTION INTRAVENOUS; SUBCUTANEOUS at 05:17

## 2024-02-11 RX ADMIN — Medication 400 MILLIGRAM(S): at 20:12

## 2024-02-11 RX ADMIN — LIDOCAINE 1 PATCH: 4 CREAM TOPICAL at 08:08

## 2024-02-11 NOTE — PROGRESS NOTE ADULT - ASSESSMENT
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     PLAN  =====Neurologic=====  #CVA  - prior CVA x3 with no residual deficits    #myoclonic jerks  - on Keppra 500 mg BID, per neuro wishing to wean however per family request will continue at 500 mg BID, but now off valproate per neuro    - also per neuro, use precedex as needed for clinical sedation   --2/10, OK to start propofol as trach planned for 2/12    - holding home  gabapentin and memantine in setting of somnolence  - continue lexapro     #AMS  - CT/CTA negative for stroke. EEG now off   - MRI brain - unremarkable findings. Family now amenable to Trach/PEG. Will arrange Trach next week.       =====Pulmonary=====  #Prior aspiration PNA  - s/p zosyn for aspiration PNA from 1/20- 1/28     #COVID  - s/p paxlovid and 1 dose remdesivir while inpatient    #Pleural effusions b/l  - CT chest from 1/16 showing new small bilateral pleural effusions/ atelectasis/ patchy bilateral ground-glass opacities are consistent with pulmonary edema.  - Diuresis PRN based on amount of pleural effusions on POCUS    #AHRF  - Intubated for airway protection in s/o AMS  - Holding brilinta for possible procedure. Will need to restart ASAP after trach/PEG  --trach tentatively for Monday 2/12    =====Cardiovascular=====  Normotensive, not requiring pressors or midodrine.     #HTN  - on home amlodipine and metoprolol currently holding     #CAD  - on Brilinta (holding) aspirin and ranolazine continue   - as per vascular okay to hold Brilinta for possible pleural effusion thoracentesis  -have not heard back from cardiology regarding Brilinta / last stent 2022 , will hold   -Need to restart brilinta ASAP after Trach/PEG    #Afib  - pt with known history of pAF, first observed 2/1  - can consider starting AC and/or rate control if persists     =====GI=====  #upper GI bleed  - s/p EGD showing dieulafoy lesion and esophagitis likely 2/2 NGT irritation s/p 2 clips  - on protonix IV BID continue   - CT on 1/25 with cholelithiasis and sludge HIDA on 1/26 confirming likely acute choley, s/p IR perc cholecystostomy 1/26   - Discussed w/ family, want trach/PEG. Discussed w/ GI - Only will do PEG 24-48 hours after trach placed. Recommend reaching out when trach date is set  - Trach on 2/12, will need to e-mail GI after trach placed. Informed GI of date on 2/9    #Diet  - tube feeds NGT   - NPO MN on 2/11 for ?trach 2/12    =====Renal/=====  #Electrolytes  - Maintain K>4, Phos>3, Mag>2, iCal>1  - baseline cr 1.5, at baseline  - on free water flushes for hyperNa. Trend BMP/Na   - Diuresis PRN    Trial of Void as of 2/9    =====Endocrine=====  #DM2  - on lantus 12U and q6 SSI  - a1c 9.3, glucose wnl inpatient     =====Infectious Disease=====  #COVID   - s/p paxlovid and 1 dose remdesivir  # PNA  - CT chest showing ground glass opacities  - s/p zosyn  - intermittent episodes of fevers, likely source GI given choleycystitis? culture NGTD  - meropenem 5d course thru 2/1   - now monitoring off antibiotics    =====Heme/Onc=====  #DVT Ppx  - heparin sub-q    =====Ethics=====  FULL CODE.  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     PLAN  =====Neurologic=====  #CVA  - prior CVA x3 with no residual deficits    #myoclonic jerks  - on Keppra 500 mg BID, per neuro wishing to wean however per family request will continue at 500 mg BID, but now off valproate per neuro    - also per neuro, use precedex as needed for clinical sedation   --2/10, OK to start propofol as trach planned for 2/12    - holding home  gabapentin and memantine in setting of somnolence  - continue lexapro     #AMS  - CT/CTA negative for stroke. EEG now off   - MRI brain - unremarkable findings. Family now amenable to Trach/PEG. Will arrange Trach next week, tentative 2/19       =====Pulmonary=====  #Prior aspiration PNA  - s/p zosyn for aspiration PNA from 1/20- 1/28     #COVID  - s/p paxlovid and 1 dose remdesivir while inpatient    #Pleural effusions b/l  - CT chest from 1/16 showing new small bilateral pleural effusions/ atelectasis/ patchy bilateral ground-glass opacities are consistent with pulmonary edema.  - Diuresis PRN based on amount of pleural effusions on POCUS    #AHRF  - Intubated for airway protection in s/o AMS. Sedated  - Holding brilinta for possible procedure. Will need to restart ASAP after trach/PEG  --trach tentatively for Monday 2/12. NPO @ MN w/ Pre-op labs.     =====Cardiovascular=====  Normotensive, not requiring pressors or midodrine - stopped midodrine on 2/11    #HTN  - on home amlodipine and metoprolol currently holding     #CAD  - on Brilinta (holding) aspirin and ranolazine continue   - as per vascular okay to hold Brilinta for possible pleural effusion thoracentesis  -have not heard back from cardiology regarding Brilinta / last stent 2022 , will hold   -Need to restart brilinta ASAP after Trach/PEG    #Afib  - pt with known history of pAF, first observed 2/1  - can consider starting AC and/or rate control if persists     =====GI=====  #upper GI bleed  - s/p EGD showing dieulafoy lesion and esophagitis likely 2/2 NGT irritation s/p 2 clips  - on protonix IV BID continue   - CT on 1/25 with cholelithiasis and sludge HIDA on 1/26 confirming likely acute choley, s/p IR perc cholecystostomy 1/26   - Discussed w/ family, want trach/PEG. Discussed w/ GI - Only will do PEG 24-48 hours after trach placed. Recommend reaching out when trach date is set  - Trach on 2/12, will need to e-mail GI after trach placed. Informed GI of date on 2/9    #Diet  - tube feeds NGT   - Per GI, re-email GI for consult for PEG after trach placed, plan for PEG 24-48 hrs after trach.     =====Renal/=====  #Electrolytes  - Maintain K>4, Phos>3, Mag>2, iCal>1  - baseline cr 1.5, at baseline  - on free water flushes for hyperNa. Trend BMP/Na - adjsted to 200 q12h   - Diuresis PRN - Will give 40 mg IVP Lasix 2/11.     Trial of Void as of 2/9    =====Endocrine=====  #DM2  - on lantus 12U and q6 SSI  - a1c 9.3, glucose wnl inpatient     =====Infectious Disease=====  #COVID   - s/p paxlovid and 1 dose remdesivir  # PNA  - CT chest showing ground glass opacities  - s/p zosyn  - intermittent episodes of fevers, likely source GI given choleycystitis? culture NGTD  - meropenem 5d course thru 2/1   - now monitoring off antibiotics  - newly hypothermic on 2/11, UA and BCx sent    =====Heme/Onc=====  #DVT Ppx  - heparin sub-q    =====Ethics=====  FULL CODE.  After family discussion, Trach/PEG

## 2024-02-11 NOTE — PROGRESS NOTE ADULT - SUBJECTIVE AND OBJECTIVE BOX
Aashish Boyer MD  Interventional Cardiology / Advance Heart Failure and Cardiac Transplant Specialist  [place] Office : [phone] [place] [address]  Tel:   [place] Office : [address] [facility] [address]  Tel: [phone]      Subjective/Overnight events: Pt is lying in bed remains intubated in ICU      MEDICATIONS:  aspirin  chewable 81 milliGRAM(s) Enteral Tube daily  doxazosin 2 milliGRAM(s) Oral at bedtime  heparin   Injectable 5000 Unit(s) SubCutaneous every 8 hours      albuterol/ipratropium for Nebulization 3 milliLiter(s) Nebulizer every 6 hours  sodium chloride 3%  Inhalation 4 milliLiter(s) Inhalation two times a day    escitalopram 10 milliGRAM(s) Oral daily  levETIRAcetam  IVPB 500 milliGRAM(s) IV Intermittent every 12 hours  propofol Infusion 10 MICROgram(s)/kG/Min IV Continuous <Continuous>    pantoprazole  Injectable 40 milliGRAM(s) IV Push every 12 hours  sucralfate suspension 1 Gram(s) Enteral Tube two times a day    dextrose 50% Injectable 25 Gram(s) IV Push once  dextrose 50% Injectable 25 Gram(s) IV Push once  dextrose 50% Injectable 12.5 Gram(s) IV Push once  dextrose Oral Gel 15 Gram(s) Oral once PRN  glucagon  Injectable 1 milliGRAM(s) IntraMuscular once  insulin glargine Injectable (LANTUS) 12 Unit(s) SubCutaneous <User Schedule>  insulin lispro (ADMELOG) corrective regimen sliding scale   SubCutaneous every 6 hours    artificial  tears Solution 1 Drop(s) Left EYE four times a day  chlorhexidine 0.12% Liquid 15 milliLiter(s) Oral Mucosa every 12 hours  chlorhexidine 2% Cloths 1 Application(s) Topical daily  dextrose 5%. 1000 milliLiter(s) IV Continuous <Continuous>  dextrose 5%. 1000 milliLiter(s) IV Continuous <Continuous>  lidocaine   4% Patch 1 Patch Transdermal every 24 hours  petrolatum Ophthalmic Ointment 1 Application(s) Left EYE at bedtime      PAST MEDICAL/SURGICAL HISTORY  PAST MEDICAL & SURGICAL HISTORY:  Hyperlipemia      Hypertension      Coronary Artery Disease      Diabetes Mellitus Type II      Stented Coronary Artery  total 5 stents, last stent 5/2019      Neuropathy      Myocardial infarction      Stroke  mild left facial numbness   no other residuals verbalized      Myoclonic jerking      Stage 3 chronic kidney disease      History of Cataract Extraction      Hx of CABG      H/O coronary angiogram      S/P coronary artery stent placement  1/6/09      S/P placement of cardiac pacemaker          SOCIAL HISTORY: Substance Use (street drugs): ( x ) never used  (  ) other:    FAMILY HISTORY:  No pertinent family history in first degree relatives          PHYSICAL EXAM:  T(C): 35.6 (02-11-24 @ 12:00), Max: 36.7 (02-10-24 @ 20:00)  HR: 80 (02-11-24 @ 12:02) (58 - 88)  BP: 122/56 (02-11-24 @ 12:00) (112/45 - 144/64)  RR: 15 (02-11-24 @ 12:00) (12 - 25)  SpO2: 100% (02-11-24 @ 12:02) (96% - 100%)  Wt(kg): --  I&O's Summary    10 Feb 2024 07:01  -  11 Feb 2024 07:00  --------------------------------------------------------  IN: 2485.8 mL / OUT: 1140 mL / NET: 1345.8 mL    11 Feb 2024 07:01  -  11 Feb 2024 12:49  --------------------------------------------------------  IN: 426.2 mL / OUT: 320 mL / NET: 106.2 mL          GENERAL: intubated  EYES:  conjunctiva and sclera clear  ENMT: No tonsillar erythema, exudates, or enlargement  Cardiovascular: Normal S1 S2, No JVD, No murmurs, No edema  Respiratory: Lungs clear to auscultation	  Gastrointestinal:  Soft  Extremities: No edema                                         7.1    5.57  )-----------( 294      ( 11 Feb 2024 00:24 )             22.4     02-11    138  |  102  |  27<H>  ----------------------------<  121<H>  4.0   |  27  |  1.29    Ca    8.3<L>      11 Feb 2024 00:24  Phos  3.1     02-11  Mg     2.10     02-11    TPro  6.1  /  Alb  2.3<L>  /  TBili  0.3  /  DBili  x   /  AST  26  /  ALT  20  /  AlkPhos  81  02-11    proBNP:   Lipid Profile:   HgA1c:   TSH:     Consultant(s) Notes Reviewed:  [x ] YES  [ ] NO    Care Discussed with Consultants/Other Providers [ x] YES  [ ] NO    Imaging Personally Reviewed independently:  [x] YES  [ ] NO    All labs, radiologic studies, vitals, orders and medications list reviewed. Patient is seen and examined at bedside. Case discussed with medical team.

## 2024-02-11 NOTE — PROGRESS NOTE ADULT - ATTENDING COMMENTS
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 Course c/b acute cholecystitis s/p perc asa 1/26 by IR, also with recent SMA stent.     Patient failed trial of extubation, also possible sz. More awake. Was pending tracheostomy but family declined and would want an MRI prior to assess for structural abn prior to tracheostomy.   MRI of head negative. Now pending trach and peg    Hypothermic overnight pending repeat cultures.     #Acute hypoxemic/hypercapnic respiratory failure  #Multifocal Pneumonia  #Dysphagia  #Acute cholecystitis  #Encephalopathy  #Afib - New, intermittent  #SMA stent   #Petechial hemorrhages  #Hypothermia  - Frequent aspiration noted clinically and on CT scans. now reintubated, unable to clear secretions. 2/2 to poor cough and mental status.   - EEG now without sz, neuro following c/w keppra 500. D/C eeg. MRI without acute findings.   - S/P course of antibiotics for MSSA pna as well as aspiration. Continue to monitor off abx.   - Hypothermic overnight. Will repeat blood culture. Low threshold for antibiotics.   - C/W vent, adjust based on gas,  will trend based on blood gas. Will diurese PRN  - S/P IR drainage of biliary tube, monitor output. Reconsult IR once acute issues resolve.   - new AFib x1, not on full ac at this time. was In the setting of critical illness, currently being monitored on tele and NSR.  - C/W ASA, Brilinta on hold for possible procedure. Per vascular can hold temporarily. Will restart soon as patient is trached/PEG.  - Seen by optho, continue to monitor  - Grossly overloaded with pulmonary edema and effusions. Lasix PRN.   - oropharyngeal dysphagia will need peg given repeated aspiration/ GI reconsulted.   - Family now in agreement with trach and peg. Pending Trach with IP on monday.   - DVT ppx - SQH  - Dispo- family agreeable with trach and peg. 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 Course c/b acute cholecystitis s/p perc asa 1/26 by IR, also with recent SMA stent.     Patient failed trial of extubation, also possible sz. More awake. Was pending tracheostomy but family declined and would want an MRI prior to assess for structural abn prior to tracheostomy.   MRI of head negative. Now pending trach and peg    Hypothermic overnight pending repeat cultures.     #Acute hypoxemic/hypercapnic respiratory failure  #Multifocal Pneumonia  #Dysphagia  #Acute cholecystitis  #Encephalopathy  #Afib - New, intermittent  #SMA stent   #Petechial hemorrhages  #Hypothermia  - Frequent aspiration noted clinically and on CT scans. now reintubated, unable to clear secretions. 2/2 to poor cough and mental status.   - EEG now without sz, neuro following c/w keppra 500. D/C eeg. MRI without acute findings.   - S/P course of antibiotics for MSSA pna as well as aspiration. Continue to monitor off abx.   - Hypothermic overnight. Will repeat blood culture, UA.  Low threshold for antibiotics.   - C/W vent, adjust based on gas,  will trend based on blood gas. Will diurese PRN  - S/P IR drainage of biliary tube, monitor output. Reconsult IR once acute issues resolve.   - new AFib x1, not on full ac at this time. was In the setting of critical illness, currently being monitored on tele and NSR.  - C/W ASA, Brilinta on hold for possible procedure. Per vascular can hold temporarily. Will restart soon as patient is trached/PEG.  - Seen by optho, continue to monitor  - Grossly overloaded with pulmonary edema and effusions. Lasix PRN.   - oropharyngeal dysphagia will need peg given repeated aspiration/ GI reconsulted.   - Family now in agreement with trach and peg. Pending Trach with IP on monday.   - DVT ppx - SQH  - Dispo- family agreeable with trach and peg.

## 2024-02-11 NOTE — PROGRESS NOTE ADULT - SUBJECTIVE AND OBJECTIVE BOX
***************************************************************  Candelario (Renee) Shantel PGY2  MICU  ***************************************************************    SHAIK CHARANJIT  89y  MRN: [id number]    Patient is a 89y old  Male who presents with a chief complaint of COVID19, Chest pain (10 Feb 2024 14:55)      Subjective: no events ON. Denies fever, CP, SOB, abn pain, N/V, dysuria. Tolerating diet.      MEDICATIONS  (STANDING):  albuterol/ipratropium for Nebulization 3 milliLiter(s) Nebulizer every 6 hours  artificial  tears Solution 1 Drop(s) Left EYE four times a day  aspirin  chewable 81 milliGRAM(s) Enteral Tube daily  chlorhexidine 0.12% Liquid 15 milliLiter(s) Oral Mucosa every 12 hours  chlorhexidine 2% Cloths 1 Application(s) Topical daily  dexMEDEtomidine Infusion 0.2 MICROgram(s)/kG/Hr (3.97 mL/Hr) IV Continuous <Continuous>  dextrose 5%. 1000 milliLiter(s) (50 mL/Hr) IV Continuous <Continuous>  dextrose 5%. 1000 milliLiter(s) (100 mL/Hr) IV Continuous <Continuous>  dextrose 50% Injectable 25 Gram(s) IV Push once  dextrose 50% Injectable 25 Gram(s) IV Push once  dextrose 50% Injectable 12.5 Gram(s) IV Push once  doxazosin 2 milliGRAM(s) Oral at bedtime  escitalopram 10 milliGRAM(s) Oral daily  glucagon  Injectable 1 milliGRAM(s) IntraMuscular once  heparin   Injectable 5000 Unit(s) SubCutaneous every 8 hours  insulin glargine Injectable (LANTUS) 12 Unit(s) SubCutaneous <User Schedule>  insulin lispro (ADMELOG) corrective regimen sliding scale   SubCutaneous every 6 hours  levETIRAcetam  IVPB 500 milliGRAM(s) IV Intermittent every 12 hours  lidocaine   4% Patch 1 Patch Transdermal every 24 hours  midodrine 10 milliGRAM(s) Oral every 8 hours  pantoprazole  Injectable 40 milliGRAM(s) IV Push every 12 hours  petrolatum Ophthalmic Ointment 1 Application(s) Left EYE at bedtime  propofol Infusion 10 MICROgram(s)/kG/Min (4.76 mL/Hr) IV Continuous <Continuous>  sodium chloride 3%  Inhalation 4 milliLiter(s) Inhalation two times a day  sucralfate suspension 1 Gram(s) Enteral Tube two times a day    MEDICATIONS  (PRN):  dextrose Oral Gel 15 Gram(s) Oral once PRN Blood Glucose LESS THAN 70 milliGRAM(s)/deciliter      Objective:    Vitals: Vital Signs Last 24 Hrs  T(C): 36.6 (02-11-24 @ 04:00), Max: 36.7 (02-10-24 @ 08:00)  T(F): 97.8 (02-11-24 @ 04:00), Max: 98.1 (02-10-24 @ 08:00)  HR: 75 (02-11-24 @ 06:00) (71 - 88)  BP: 136/59 (02-11-24 @ 06:00) (112/45 - 144/64)  BP(mean): 79 (02-11-24 @ 06:00) (62 - 83)  RR: 12 (02-11-24 @ 06:00) (12 - 25)  SpO2: 98% (02-11-24 @ 06:00) (96% - 100%)            I&O's Summary    09 Feb 2024 07:01  -  10 Feb 2024 07:00  --------------------------------------------------------  IN: 2257.6 mL / OUT: 1345 mL / NET: 912.6 mL    10 Feb 2024 07:01  -  11 Feb 2024 06:53  --------------------------------------------------------  IN: 2485.8 mL / OUT: 1140 mL / NET: 1345.8 mL        PHYSICAL EXAM:  GENERAL: NAD  HEAD:  Atraumatic, Normocephalic  EYES: EOMI, conjunctiva and sclera clear  CHEST/LUNG: Clear to auscultation bilaterally; No rales, rhonchi, wheezing, or rubs  HEART: Regular rate and rhythm; No murmurs, rubs, or gallops  ABDOMEN: Soft, Nontender, Nondistended;   SKIN: No rashes or lesions  NERVOUS SYSTEM:  Alert & Oriented X3, no focal deficits    LABS:  02-11    138  |  102  |  27<H>  ----------------------------<  121<H>  4.0   |  27  |  1.29  02-10    138  |  100  |  30<H>  ----------------------------<  177<H>  4.5   |  29  |  1.42<H>  02-09    137  |  99  |  30<H>  ----------------------------<  170<H>  3.8   |  27  |  1.44<H>    Ca    8.3<L>      11 Feb 2024 00:24  Ca    8.2<L>      10 Feb 2024 02:40  Ca    8.2<L>      09 Feb 2024 00:45  Phos  3.1     02-11  Mg     2.10     02-11    TPro  6.1  /  Alb  2.3<L>  /  TBili  0.3  /  DBili  x   /  AST  26  /  ALT  20  /  AlkPhos  81  02-11  TPro  6.2  /  Alb  2.2<L>  /  TBili  0.2  /  DBili  x   /  AST  24  /  ALT  18  /  AlkPhos  112  02-10  TPro  6.2  /  Alb  2.3<L>  /  TBili  0.3  /  DBili  x   /  AST  38  /  ALT  21  /  AlkPhos  87  02-09      PT/INR - ( 11 Feb 2024 00:50 )   PT: 11.3 sec;   INR: 1.01 ratio         PTT - ( 11 Feb 2024 00:50 )  PTT:30.8 sec              Urinalysis Basic - ( 11 Feb 2024 00:24 )    Color: x / Appearance: x / SG: x / pH: x  Gluc: 121 mg/dL / Ketone: x  / Bili: x / Urobili: x   Blood: x / Protein: x / Nitrite: x   Leuk Esterase: x / RBC: x / WBC x   Sq Epi: x / Non Sq Epi: x / Bacteria: x                              7.1    5.57  )-----------( 294      ( 11 Feb 2024 00:24 )             22.4                         7.1    6.41  )-----------( 285      ( 10 Feb 2024 02:40 )             22.9                         7.3    7.03  )-----------( 262      ( 09 Feb 2024 00:45 )             23.5     CAPILLARY BLOOD GLUCOSE      POCT Blood Glucose.: 174 mg/dL (11 Feb 2024 05:08)  POCT Blood Glucose.: 141 mg/dL (10 Feb 2024 23:25)  POCT Blood Glucose.: 277 mg/dL (10 Feb 2024 18:29)  POCT Blood Glucose.: 183 mg/dL (10 Feb 2024 12:32)      RADIOLOGY & ADDITIONAL TESTS:    Imaging Personally Reviewed:  [x ] YES  [ ] NO    Consultants involved in case:   Consultant(s) Notes Reviewed:  [ x] YES  [ ] NO:   Care Discussed with Consultants/Other Providers [x ] YES  [ ] NO         ***************************************************************  Candelario (MuniraNvjot) Shantel PGY2  MICU  ***************************************************************    SHAIK CHARANJIT  89y  MRN: [id number]    Patient is a 89y old  Male who presents with a chief complaint of COVID19, Chest pain (10 Feb 2024 14:55)      Subjective: NAEO. Moss back in, remains intubated and sedated, awaiting trach/PEG.    MEDICATIONS  (STANDING):  albuterol/ipratropium for Nebulization 3 milliLiter(s) Nebulizer every 6 hours  artificial  tears Solution 1 Drop(s) Left EYE four times a day  aspirin  chewable 81 milliGRAM(s) Enteral Tube daily  chlorhexidine 0.12% Liquid 15 milliLiter(s) Oral Mucosa every 12 hours  chlorhexidine 2% Cloths 1 Application(s) Topical daily  dexMEDEtomidine Infusion 0.2 MICROgram(s)/kG/Hr (3.97 mL/Hr) IV Continuous <Continuous>  dextrose 5%. 1000 milliLiter(s) (50 mL/Hr) IV Continuous <Continuous>  dextrose 5%. 1000 milliLiter(s) (100 mL/Hr) IV Continuous <Continuous>  dextrose 50% Injectable 25 Gram(s) IV Push once  dextrose 50% Injectable 25 Gram(s) IV Push once  dextrose 50% Injectable 12.5 Gram(s) IV Push once  doxazosin 2 milliGRAM(s) Oral at bedtime  escitalopram 10 milliGRAM(s) Oral daily  glucagon  Injectable 1 milliGRAM(s) IntraMuscular once  heparin   Injectable 5000 Unit(s) SubCutaneous every 8 hours  insulin glargine Injectable (LANTUS) 12 Unit(s) SubCutaneous <User Schedule>  insulin lispro (ADMELOG) corrective regimen sliding scale   SubCutaneous every 6 hours  levETIRAcetam  IVPB 500 milliGRAM(s) IV Intermittent every 12 hours  lidocaine   4% Patch 1 Patch Transdermal every 24 hours  midodrine 10 milliGRAM(s) Oral every 8 hours  pantoprazole  Injectable 40 milliGRAM(s) IV Push every 12 hours  petrolatum Ophthalmic Ointment 1 Application(s) Left EYE at bedtime  propofol Infusion 10 MICROgram(s)/kG/Min (4.76 mL/Hr) IV Continuous <Continuous>  sodium chloride 3%  Inhalation 4 milliLiter(s) Inhalation two times a day  sucralfate suspension 1 Gram(s) Enteral Tube two times a day    MEDICATIONS  (PRN):  dextrose Oral Gel 15 Gram(s) Oral once PRN Blood Glucose LESS THAN 70 milliGRAM(s)/deciliter      Objective:    Vitals: Vital Signs Last 24 Hrs  T(C): 36.6 (02-11-24 @ 04:00), Max: 36.7 (02-10-24 @ 08:00)  T(F): 97.8 (02-11-24 @ 04:00), Max: 98.1 (02-10-24 @ 08:00)  HR: 75 (02-11-24 @ 06:00) (71 - 88)  BP: 136/59 (02-11-24 @ 06:00) (112/45 - 144/64)  BP(mean): 79 (02-11-24 @ 06:00) (62 - 83)  RR: 12 (02-11-24 @ 06:00) (12 - 25)  SpO2: 98% (02-11-24 @ 06:00) (96% - 100%)            I&O's Summary    09 Feb 2024 07:01  -  10 Feb 2024 07:00  --------------------------------------------------------  IN: 2257.6 mL / OUT: 1345 mL / NET: 912.6 mL    10 Feb 2024 07:01  -  11 Feb 2024 06:53  --------------------------------------------------------  IN: 2485.8 mL / OUT: 1140 mL / NET: 1345.8 mL        PHYSICAL EXAM:  GENERAL: NAD, intubated and sedated  HEAD:  Atraumatic, Normocephalic  EYES: EOMI, conjunctiva and sclera clear  CHEST/LUNG: ventilator sounds  HEART: Regular rate and rhythm; No murmurs, rubs, or gallops  ABDOMEN: Soft, Nontender, Nondistended;   SKIN: No rashes or lesions, 1+ edema to shins b/l  NERVOUS SYSTEM:  Alert & Oriented X0, sedated    LABS:  02-11    138  |  102  |  27<H>  ----------------------------<  121<H>  4.0   |  27  |  1.29  02-10    138  |  100  |  30<H>  ----------------------------<  177<H>  4.5   |  29  |  1.42<H>  02-09    137  |  99  |  30<H>  ----------------------------<  170<H>  3.8   |  27  |  1.44<H>    Ca    8.3<L>      11 Feb 2024 00:24  Ca    8.2<L>      10 Feb 2024 02:40  Ca    8.2<L>      09 Feb 2024 00:45  Phos  3.1     02-11  Mg     2.10     02-11    TPro  6.1  /  Alb  2.3<L>  /  TBili  0.3  /  DBili  x   /  AST  26  /  ALT  20  /  AlkPhos  81  02-11  TPro  6.2  /  Alb  2.2<L>  /  TBili  0.2  /  DBili  x   /  AST  24  /  ALT  18  /  AlkPhos  112  02-10  TPro  6.2  /  Alb  2.3<L>  /  TBili  0.3  /  DBili  x   /  AST  38  /  ALT  21  /  AlkPhos  87  02-09      PT/INR - ( 11 Feb 2024 00:50 )   PT: 11.3 sec;   INR: 1.01 ratio         PTT - ( 11 Feb 2024 00:50 )  PTT:30.8 sec              Urinalysis Basic - ( 11 Feb 2024 00:24 )    Color: x / Appearance: x / SG: x / pH: x  Gluc: 121 mg/dL / Ketone: x  / Bili: x / Urobili: x   Blood: x / Protein: x / Nitrite: x   Leuk Esterase: x / RBC: x / WBC x   Sq Epi: x / Non Sq Epi: x / Bacteria: x                              7.1    5.57  )-----------( 294      ( 11 Feb 2024 00:24 )             22.4                         7.1    6.41  )-----------( 285      ( 10 Feb 2024 02:40 )             22.9                         7.3    7.03  )-----------( 262      ( 09 Feb 2024 00:45 )             23.5     CAPILLARY BLOOD GLUCOSE      POCT Blood Glucose.: 174 mg/dL (11 Feb 2024 05:08)  POCT Blood Glucose.: 141 mg/dL (10 Feb 2024 23:25)  POCT Blood Glucose.: 277 mg/dL (10 Feb 2024 18:29)  POCT Blood Glucose.: 183 mg/dL (10 Feb 2024 12:32)      RADIOLOGY & ADDITIONAL TESTS:    Imaging Personally Reviewed:  [x ] YES  [ ] NO    Consultants involved in case:   Consultant(s) Notes Reviewed:  [ x] YES  [ ] NO:   Care Discussed with Consultants/Other Providers [x ] YES  [ ] NO

## 2024-02-12 ENCOUNTER — RESULT REVIEW (OUTPATIENT)
Age: 89
End: 2024-02-12

## 2024-02-12 LAB
ALBUMIN SERPL ELPH-MCNC: 2.2 G/DL — LOW (ref 3.3–5)
ALBUMIN SERPL ELPH-MCNC: 2.4 G/DL — LOW (ref 3.3–5)
ALP SERPL-CCNC: 96 U/L — SIGNIFICANT CHANGE UP (ref 40–120)
ALP SERPL-CCNC: 97 U/L — SIGNIFICANT CHANGE UP (ref 40–120)
ALT FLD-CCNC: 23 U/L — SIGNIFICANT CHANGE UP (ref 4–41)
ALT FLD-CCNC: 23 U/L — SIGNIFICANT CHANGE UP (ref 4–41)
ANION GAP SERPL CALC-SCNC: 12 MMOL/L — SIGNIFICANT CHANGE UP (ref 7–14)
ANION GAP SERPL CALC-SCNC: 13 MMOL/L — SIGNIFICANT CHANGE UP (ref 7–14)
APTT BLD: 31.8 SEC — SIGNIFICANT CHANGE UP (ref 24.5–35.6)
AST SERPL-CCNC: 28 U/L — SIGNIFICANT CHANGE UP (ref 4–40)
AST SERPL-CCNC: 31 U/L — SIGNIFICANT CHANGE UP (ref 4–40)
B PERT DNA SPEC QL NAA+PROBE: SIGNIFICANT CHANGE UP
B PERT+PARAPERT DNA PNL SPEC NAA+PROBE: SIGNIFICANT CHANGE UP
BASE EXCESS BLDV CALC-SCNC: 5.9 MMOL/L — HIGH (ref -2–3)
BASOPHILS # BLD AUTO: 0.03 K/UL — SIGNIFICANT CHANGE UP (ref 0–0.2)
BASOPHILS NFR BLD AUTO: 0.3 % — SIGNIFICANT CHANGE UP (ref 0–2)
BILIRUB SERPL-MCNC: 0.2 MG/DL — SIGNIFICANT CHANGE UP (ref 0.2–1.2)
BILIRUB SERPL-MCNC: 0.3 MG/DL — SIGNIFICANT CHANGE UP (ref 0.2–1.2)
BLD GP AB SCN SERPL QL: NEGATIVE — SIGNIFICANT CHANGE UP
BLOOD GAS VENOUS COMPREHENSIVE RESULT: SIGNIFICANT CHANGE UP
BORDETELLA PARAPERTUSSIS (RAPRVP): SIGNIFICANT CHANGE UP
BUN SERPL-MCNC: 30 MG/DL — HIGH (ref 7–23)
BUN SERPL-MCNC: 31 MG/DL — HIGH (ref 7–23)
C PNEUM DNA SPEC QL NAA+PROBE: SIGNIFICANT CHANGE UP
CALCIUM SERPL-MCNC: 8 MG/DL — LOW (ref 8.4–10.5)
CALCIUM SERPL-MCNC: 8.1 MG/DL — LOW (ref 8.4–10.5)
CHLORIDE BLDV-SCNC: 100 MMOL/L — SIGNIFICANT CHANGE UP (ref 96–108)
CHLORIDE SERPL-SCNC: 95 MMOL/L — LOW (ref 98–107)
CHLORIDE SERPL-SCNC: 97 MMOL/L — LOW (ref 98–107)
CO2 BLDV-SCNC: 32.6 MMOL/L — HIGH (ref 22–26)
CO2 SERPL-SCNC: 25 MMOL/L — SIGNIFICANT CHANGE UP (ref 22–31)
CO2 SERPL-SCNC: 26 MMOL/L — SIGNIFICANT CHANGE UP (ref 22–31)
CREAT SERPL-MCNC: 1.41 MG/DL — HIGH (ref 0.5–1.3)
CREAT SERPL-MCNC: 1.43 MG/DL — HIGH (ref 0.5–1.3)
EGFR: 47 ML/MIN/1.73M2 — LOW
EGFR: 47 ML/MIN/1.73M2 — LOW
EOSINOPHIL # BLD AUTO: 0.52 K/UL — HIGH (ref 0–0.5)
EOSINOPHIL NFR BLD AUTO: 5.6 % — SIGNIFICANT CHANGE UP (ref 0–6)
FLUAV SUBTYP SPEC NAA+PROBE: SIGNIFICANT CHANGE UP
FLUBV RNA SPEC QL NAA+PROBE: SIGNIFICANT CHANGE UP
GAS PNL BLDV: 131 MMOL/L — LOW (ref 136–145)
GAS PNL BLDV: SIGNIFICANT CHANGE UP
GLUCOSE BLDC GLUCOMTR-MCNC: 200 MG/DL — HIGH (ref 70–99)
GLUCOSE BLDC GLUCOMTR-MCNC: 205 MG/DL — HIGH (ref 70–99)
GLUCOSE BLDC GLUCOMTR-MCNC: 206 MG/DL — HIGH (ref 70–99)
GLUCOSE BLDC GLUCOMTR-MCNC: 219 MG/DL — HIGH (ref 70–99)
GLUCOSE BLDV-MCNC: 220 MG/DL — HIGH (ref 70–99)
GLUCOSE SERPL-MCNC: 172 MG/DL — HIGH (ref 70–99)
GLUCOSE SERPL-MCNC: 219 MG/DL — HIGH (ref 70–99)
HADV DNA SPEC QL NAA+PROBE: SIGNIFICANT CHANGE UP
HCO3 BLDV-SCNC: 31 MMOL/L — HIGH (ref 22–29)
HCOV 229E RNA SPEC QL NAA+PROBE: SIGNIFICANT CHANGE UP
HCOV HKU1 RNA SPEC QL NAA+PROBE: SIGNIFICANT CHANGE UP
HCOV NL63 RNA SPEC QL NAA+PROBE: SIGNIFICANT CHANGE UP
HCOV OC43 RNA SPEC QL NAA+PROBE: SIGNIFICANT CHANGE UP
HCT VFR BLD CALC: 24.3 % — LOW (ref 39–50)
HCT VFR BLDA CALC: 24 % — LOW (ref 39–51)
HGB BLD CALC-MCNC: 7.9 G/DL — LOW (ref 12.6–17.4)
HGB BLD-MCNC: 7.5 G/DL — LOW (ref 13–17)
HMPV RNA SPEC QL NAA+PROBE: SIGNIFICANT CHANGE UP
HPIV1 RNA SPEC QL NAA+PROBE: SIGNIFICANT CHANGE UP
HPIV2 RNA SPEC QL NAA+PROBE: SIGNIFICANT CHANGE UP
HPIV3 RNA SPEC QL NAA+PROBE: SIGNIFICANT CHANGE UP
HPIV4 RNA SPEC QL NAA+PROBE: SIGNIFICANT CHANGE UP
IANC: 5.66 K/UL — SIGNIFICANT CHANGE UP (ref 1.8–7.4)
IMM GRANULOCYTES NFR BLD AUTO: 0.9 % — SIGNIFICANT CHANGE UP (ref 0–0.9)
INR BLD: 1.06 RATIO — SIGNIFICANT CHANGE UP (ref 0.85–1.18)
LACTATE BLDV-MCNC: 2.2 MMOL/L — HIGH (ref 0.5–2)
LYMPHOCYTES # BLD AUTO: 1.95 K/UL — SIGNIFICANT CHANGE UP (ref 1–3.3)
LYMPHOCYTES # BLD AUTO: 21.1 % — SIGNIFICANT CHANGE UP (ref 13–44)
M PNEUMO DNA SPEC QL NAA+PROBE: SIGNIFICANT CHANGE UP
MAGNESIUM SERPL-MCNC: 2 MG/DL — SIGNIFICANT CHANGE UP (ref 1.6–2.6)
MAGNESIUM SERPL-MCNC: 2.1 MG/DL — SIGNIFICANT CHANGE UP (ref 1.6–2.6)
MCHC RBC-ENTMCNC: 29.4 PG — SIGNIFICANT CHANGE UP (ref 27–34)
MCHC RBC-ENTMCNC: 30.9 GM/DL — LOW (ref 32–36)
MCV RBC AUTO: 95.3 FL — SIGNIFICANT CHANGE UP (ref 80–100)
MONOCYTES # BLD AUTO: 0.99 K/UL — HIGH (ref 0–0.9)
MONOCYTES NFR BLD AUTO: 10.7 % — SIGNIFICANT CHANGE UP (ref 2–14)
MRSA PCR RESULT.: SIGNIFICANT CHANGE UP
NEUTROPHILS # BLD AUTO: 5.66 K/UL — SIGNIFICANT CHANGE UP (ref 1.8–7.4)
NEUTROPHILS NFR BLD AUTO: 61.4 % — SIGNIFICANT CHANGE UP (ref 43–77)
NRBC # BLD: 0 /100 WBCS — SIGNIFICANT CHANGE UP (ref 0–0)
NRBC # FLD: 0 K/UL — SIGNIFICANT CHANGE UP (ref 0–0)
PCO2 BLDV: 48 MMHG — SIGNIFICANT CHANGE UP (ref 42–55)
PH BLDV: 7.42 — SIGNIFICANT CHANGE UP (ref 7.32–7.43)
PHOSPHATE SERPL-MCNC: 2.9 MG/DL — SIGNIFICANT CHANGE UP (ref 2.5–4.5)
PHOSPHATE SERPL-MCNC: 2.9 MG/DL — SIGNIFICANT CHANGE UP (ref 2.5–4.5)
PLATELET # BLD AUTO: 377 K/UL — SIGNIFICANT CHANGE UP (ref 150–400)
PO2 BLDV: 45 MMHG — SIGNIFICANT CHANGE UP (ref 25–45)
POTASSIUM BLDV-SCNC: 4.6 MMOL/L — SIGNIFICANT CHANGE UP (ref 3.5–5.1)
POTASSIUM SERPL-MCNC: 4.6 MMOL/L — SIGNIFICANT CHANGE UP (ref 3.5–5.3)
POTASSIUM SERPL-MCNC: 4.8 MMOL/L — SIGNIFICANT CHANGE UP (ref 3.5–5.3)
POTASSIUM SERPL-SCNC: 4.6 MMOL/L — SIGNIFICANT CHANGE UP (ref 3.5–5.3)
POTASSIUM SERPL-SCNC: 4.8 MMOL/L — SIGNIFICANT CHANGE UP (ref 3.5–5.3)
PROCALCITONIN SERPL-MCNC: 0.32 NG/ML — HIGH (ref 0.02–0.1)
PROT SERPL-MCNC: 6.2 G/DL — SIGNIFICANT CHANGE UP (ref 6–8.3)
PROT SERPL-MCNC: 6.5 G/DL — SIGNIFICANT CHANGE UP (ref 6–8.3)
PROTHROM AB SERPL-ACNC: 11.9 SEC — SIGNIFICANT CHANGE UP (ref 9.5–13)
RAPID RVP RESULT: SIGNIFICANT CHANGE UP
RBC # BLD: 2.55 M/UL — LOW (ref 4.2–5.8)
RBC # FLD: 18.1 % — HIGH (ref 10.3–14.5)
RH IG SCN BLD-IMP: POSITIVE — SIGNIFICANT CHANGE UP
RSV RNA SPEC QL NAA+PROBE: SIGNIFICANT CHANGE UP
RV+EV RNA SPEC QL NAA+PROBE: SIGNIFICANT CHANGE UP
S AUREUS DNA NOSE QL NAA+PROBE: DETECTED
SAO2 % BLDV: 73.3 % — SIGNIFICANT CHANGE UP (ref 67–88)
SARS-COV-2 RNA SPEC QL NAA+PROBE: SIGNIFICANT CHANGE UP
SODIUM SERPL-SCNC: 132 MMOL/L — LOW (ref 135–145)
SODIUM SERPL-SCNC: 136 MMOL/L — SIGNIFICANT CHANGE UP (ref 135–145)
WBC # BLD: 9.59 K/UL — SIGNIFICANT CHANGE UP (ref 3.8–10.5)
WBC # FLD AUTO: 9.59 K/UL — SIGNIFICANT CHANGE UP (ref 3.8–10.5)

## 2024-02-12 PROCEDURE — 93306 TTE W/DOPPLER COMPLETE: CPT | Mod: 26

## 2024-02-12 PROCEDURE — 99291 CRITICAL CARE FIRST HOUR: CPT

## 2024-02-12 RX ORDER — PIPERACILLIN AND TAZOBACTAM 4; .5 G/20ML; G/20ML
3.38 INJECTION, POWDER, LYOPHILIZED, FOR SOLUTION INTRAVENOUS EVERY 8 HOURS
Refills: 0 | Status: COMPLETED | OUTPATIENT
Start: 2024-02-12 | End: 2024-02-19

## 2024-02-12 RX ORDER — HEPARIN SODIUM 5000 [USP'U]/ML
5000 INJECTION INTRAVENOUS; SUBCUTANEOUS EVERY 8 HOURS
Refills: 0 | Status: DISCONTINUED | OUTPATIENT
Start: 2024-02-12 | End: 2024-02-13

## 2024-02-12 RX ORDER — VANCOMYCIN HCL 1 G
1250 VIAL (EA) INTRAVENOUS ONCE
Refills: 0 | Status: COMPLETED | OUTPATIENT
Start: 2024-02-12 | End: 2024-02-12

## 2024-02-12 RX ORDER — SODIUM,POTASSIUM PHOSPHATES 278-250MG
1 POWDER IN PACKET (EA) ORAL EVERY 4 HOURS
Refills: 0 | Status: COMPLETED | OUTPATIENT
Start: 2024-02-12 | End: 2024-02-12

## 2024-02-12 RX ADMIN — HEPARIN SODIUM 5000 UNIT(S): 5000 INJECTION INTRAVENOUS; SUBCUTANEOUS at 15:04

## 2024-02-12 RX ADMIN — Medication 1 GRAM(S): at 05:57

## 2024-02-12 RX ADMIN — PROPOFOL 4.76 MICROGRAM(S)/KG/MIN: 10 INJECTION, EMULSION INTRAVENOUS at 17:48

## 2024-02-12 RX ADMIN — LEVETIRACETAM 400 MILLIGRAM(S): 250 TABLET, FILM COATED ORAL at 18:30

## 2024-02-12 RX ADMIN — Medication 81 MILLIGRAM(S): at 12:35

## 2024-02-12 RX ADMIN — Medication 1 PACKET(S): at 18:25

## 2024-02-12 RX ADMIN — Medication 1 APPLICATION(S): at 22:40

## 2024-02-12 RX ADMIN — SODIUM CHLORIDE 4 MILLILITER(S): 9 INJECTION INTRAMUSCULAR; INTRAVENOUS; SUBCUTANEOUS at 21:35

## 2024-02-12 RX ADMIN — Medication 2: at 05:54

## 2024-02-12 RX ADMIN — PIPERACILLIN AND TAZOBACTAM 25 GRAM(S): 4; .5 INJECTION, POWDER, LYOPHILIZED, FOR SOLUTION INTRAVENOUS at 10:15

## 2024-02-12 RX ADMIN — CHLORHEXIDINE GLUCONATE 15 MILLILITER(S): 213 SOLUTION TOPICAL at 17:46

## 2024-02-12 RX ADMIN — LEVETIRACETAM 400 MILLIGRAM(S): 250 TABLET, FILM COATED ORAL at 06:04

## 2024-02-12 RX ADMIN — CHLORHEXIDINE GLUCONATE 15 MILLILITER(S): 213 SOLUTION TOPICAL at 05:47

## 2024-02-12 RX ADMIN — Medication 1 DROP(S): at 12:33

## 2024-02-12 RX ADMIN — Medication 3 MILLILITER(S): at 16:07

## 2024-02-12 RX ADMIN — Medication 4: at 23:49

## 2024-02-12 RX ADMIN — Medication 3.72 MICROGRAM(S)/KG/MIN: at 17:45

## 2024-02-12 RX ADMIN — PIPERACILLIN AND TAZOBACTAM 25 GRAM(S): 4; .5 INJECTION, POWDER, LYOPHILIZED, FOR SOLUTION INTRAVENOUS at 02:46

## 2024-02-12 RX ADMIN — Medication 3 MILLILITER(S): at 09:10

## 2024-02-12 RX ADMIN — PROPOFOL 4.76 MICROGRAM(S)/KG/MIN: 10 INJECTION, EMULSION INTRAVENOUS at 20:04

## 2024-02-12 RX ADMIN — Medication 1 GRAM(S): at 18:26

## 2024-02-12 RX ADMIN — ESCITALOPRAM OXALATE 10 MILLIGRAM(S): 10 TABLET, FILM COATED ORAL at 12:32

## 2024-02-12 RX ADMIN — Medication 1 DROP(S): at 05:47

## 2024-02-12 RX ADMIN — SODIUM CHLORIDE 4 MILLILITER(S): 9 INJECTION INTRAMUSCULAR; INTRAVENOUS; SUBCUTANEOUS at 09:11

## 2024-02-12 RX ADMIN — INSULIN GLARGINE 12 UNIT(S): 100 INJECTION, SOLUTION SUBCUTANEOUS at 13:04

## 2024-02-12 RX ADMIN — Medication 3 MILLILITER(S): at 21:34

## 2024-02-12 RX ADMIN — PANTOPRAZOLE SODIUM 40 MILLIGRAM(S): 20 TABLET, DELAYED RELEASE ORAL at 05:48

## 2024-02-12 RX ADMIN — Medication 4: at 17:47

## 2024-02-12 RX ADMIN — Medication 3.72 MICROGRAM(S)/KG/MIN: at 20:04

## 2024-02-12 RX ADMIN — Medication 2 MILLIGRAM(S): at 22:42

## 2024-02-12 RX ADMIN — Medication 166.67 MILLIGRAM(S): at 01:12

## 2024-02-12 RX ADMIN — Medication 4: at 12:31

## 2024-02-12 RX ADMIN — Medication 3 MILLILITER(S): at 03:32

## 2024-02-12 RX ADMIN — CHLORHEXIDINE GLUCONATE 1 APPLICATION(S): 213 SOLUTION TOPICAL at 12:41

## 2024-02-12 RX ADMIN — Medication 1 DROP(S): at 18:25

## 2024-02-12 RX ADMIN — HEPARIN SODIUM 5000 UNIT(S): 5000 INJECTION INTRAVENOUS; SUBCUTANEOUS at 22:40

## 2024-02-12 RX ADMIN — Medication 1 DROP(S): at 23:51

## 2024-02-12 RX ADMIN — PROPOFOL 4.76 MICROGRAM(S)/KG/MIN: 10 INJECTION, EMULSION INTRAVENOUS at 10:18

## 2024-02-12 RX ADMIN — Medication 1 PACKET(S): at 15:04

## 2024-02-12 RX ADMIN — PIPERACILLIN AND TAZOBACTAM 25 GRAM(S): 4; .5 INJECTION, POWDER, LYOPHILIZED, FOR SOLUTION INTRAVENOUS at 17:48

## 2024-02-12 RX ADMIN — PANTOPRAZOLE SODIUM 40 MILLIGRAM(S): 20 TABLET, DELAYED RELEASE ORAL at 17:46

## 2024-02-12 NOTE — PROGRESS NOTE ADULT - SUBJECTIVE AND OBJECTIVE BOX
Nephrology Progress Note    Patient is a 89y male in ICU, intubated sedated on pressers       ROS ; unable to obtained     Allergies:  fluoroquinolone antibiotics (Other)  Cipro (Unknown)  Tegretol (Unknown)  carbamazepine (Other)    Hospital Medications:   MEDICATIONS  (STANDING):  albuterol/ipratropium for Nebulization 3 milliLiter(s) Nebulizer every 6 hours  artificial  tears Solution 1 Drop(s) Left EYE four times a day  aspirin  chewable 81 milliGRAM(s) Enteral Tube daily  chlorhexidine 0.12% Liquid 15 milliLiter(s) Oral Mucosa every 12 hours  chlorhexidine 2% Cloths 1 Application(s) Topical daily  dexMEDEtomidine Infusion 0.2 MICROgram(s)/kG/Hr (3.97 mL/Hr) IV Continuous <Continuous>  dextrose 5%. 1000 milliLiter(s) (50 mL/Hr) IV Continuous <Continuous>  dextrose 5%. 1000 milliLiter(s) (100 mL/Hr) IV Continuous <Continuous>  dextrose 50% Injectable 25 Gram(s) IV Push once  dextrose 50% Injectable 25 Gram(s) IV Push once  dextrose 50% Injectable 12.5 Gram(s) IV Push once  doxazosin 2 milliGRAM(s) Oral at bedtime  escitalopram 10 milliGRAM(s) Oral daily  glucagon  Injectable 1 milliGRAM(s) IntraMuscular once  heparin   Injectable 5000 Unit(s) SubCutaneous every 8 hours  insulin glargine Injectable (LANTUS) 12 Unit(s) SubCutaneous <User Schedule>  insulin lispro (ADMELOG) corrective regimen sliding scale   SubCutaneous every 6 hours  levETIRAcetam  IVPB 500 milliGRAM(s) IV Intermittent every 12 hours  lidocaine   4% Patch 1 Patch Transdermal every 24 hours  midodrine 10 milliGRAM(s) Oral every 8 hours  pantoprazole  Injectable 40 milliGRAM(s) IV Push every 12 hours  petrolatum Ophthalmic Ointment 1 Application(s) Left EYE at bedtime  propofol Infusion 10 MICROgram(s)/kG/Min (4.76 mL/Hr) IV Continuous <Continuous>  sodium chloride 3%  Inhalation 4 milliLiter(s) Inhalation two times a day  sucralfate suspension 1 Gram(s) Enteral Tube two times a day      VITALS:  T(F): 98.1 (02-10-24 @ 08:00), Max: 99 (02-10-24 @ 00:00)  HR: 82 (02-10-24 @ 11:54)  BP: 139/60 (02-10-24 @ 10:00)  RR: 16 (02-10-24 @ 10:00)  SpO2: 100% (02-10-24 @ 11:54)      02-08 @ 07:01  -  02-09 @ 07:00  --------------------------------------------------------  IN: 1992 mL / OUT: 2360 mL / NET: -368 mL    02-09 @ 07:01  -  02-10 @ 07:00  --------------------------------------------------------  IN: 2257.6 mL / OUT: 1345 mL / NET: 912.6 mL    02-10 @ 07:01  -  02-10 @ 14:55  --------------------------------------------------------  IN: 420.7 mL / OUT: 300 mL / NET: 120.7 mL        PHYSICAL EXAM:  Constitutional: NAD  Neck: No JVD  Respiratory: intubated  Cardiovascular: S1, S2, RRR  Gastrointestinal: BS+, soft, NT/ND  Extremities:+ peripheral edema  : +delong.   Skin: No rashes      LABS:  02-10    138  |  100  |  30<H>  ----------------------------<  177<H>  4.5   |  29  |  1.42<H>    Ca    8.2<L>      10 Feb 2024 02:40  Phos  3.0     02-10  Mg     2.10     02-10    TPro  6.2  /  Alb  2.2<L>  /  TBili  0.2  /  DBili      /  AST  24  /  ALT  18  /  AlkPhos  112  02-10                          7.1    6.41  )-----------( 285      ( 10 Feb 2024 02:40 )             22.9             RADIOLOGY & ADDITIONAL STUDIES:    CXR:2/9/2023  FINDINGS:  The endotracheal and enteric tube in addition to the pacemaker and   sternotomy wires are unchanged.  Diffuse groundglass opacities unchanged likely pulmonary edema.  Heart is not enlarged and stable in size.  No obvious effusions or pneumothorax.    IMPRESSION: Pulmonary edema with endotracheal tube.    --- End of Report ---

## 2024-02-12 NOTE — PROGRESS NOTE ADULT - ASSESSMENT
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) who presents to the hospital for COVID19 infection and chest pain  MABLE - Renal fxn improving  Hypophosphatemia- acceptable  Hypokalemia -  resolved  Hypertensive urgency -resolved -BP acceptable at present  Pulm - resp failure   s/p EEG  - eval noted  Hypernatremia - Na+ much improved  on free water prescribed via OGT    1 Renal - scr stable at present,  no objection to lasix 40mg IV PRN   continue to trend phos level daily   cont with free water flushes 250 ml q4h  2 CV -wean pressers as able   3 Vasc - s/p SMA stent placement;   4 Sx - s/p HIDA + for acute asa, medical management  5 GI - s/p EGD clipping of bleeding duodenal ulcers ,on TF   6 Anemia- , continue to trend H/H; suggest PRBC for Hgb <7.0; transfuse per primary team   7 Pulm -vent as per icu , trach for Monday  8- ID -afebrile   9 Glycemic control as able         Elsa Nunez  [facility]  Office: [phone]

## 2024-02-12 NOTE — PROGRESS NOTE ADULT - ATTENDING COMMENTS
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 Course c/b acute cholecystitis s/p perc asa 1/26 by IR, also with recent SMA stent.   Patient failed trial of extubation 2/2 ?aspiration, ?mucus plugging iso AMS 2/2 toxic metabolic encephalopathy Now pending trach and peg    Febrile overnight >102  N: Myoclonus, ?Seizure do  Toxic metabolic encephalopathy  MRI negative for acute pathology  Petechial Hemorrhages   - Daily sedation vacation  - Prop 40   - Lexapro and Keppra  - Optho for hemorrhages  CV: Sedation related hypotension  Vasoplegic shock  AF with RVR  CAD s/p CABG and PCI (2022)   - POCUS  - Levo with MAP goal >65  - ASA for CAD, holding Brillinta (plan to resume post procedure)  P Acute hypoxemic/hypercapnic respiratory failure requiring intubation mechanical ventilation  Multifocal Pneumonia  MSSA pneumonia  Pleural effusions  Aspiration   COVID  - Lung protective ventilation  - Daily PSV, tolerating 5/5, 30%  - Scant secretions, minimal suctioning requirement  - Duonebs q 6 hours, and airway clearance: inhaled hypertonic saline q 12  - Trach plan: possibly tomorrow  GI: Acute cholecystitis s/p biliary tube  SMA stent   - Planned for PEG  - Resume TF pending procedural planning  Renal:   - Trend creatinine  - Replete lytes as needed  - Diuresis as tolerated  ID: Acute asa s/p perc asa  Recurrent fevers  - Infectious work up pending  - Zosyn   Heme: Anemia of chronic disease    DVT: resume based on procedural plan  GI: TF, PPI 40 BID iso GIB    Full code  Prognosis guarded. Updated family at bedside 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) admitted to MICU for acute respiratory failure, worsening s/p bronchoscopy 1/19 requiring intubation (1/22). Course c/b acute cholecystitis s/p perc asa 1/26 by IR, also with recent SMA stent.   Patient failed trial of extubation 2/2 ?aspiration, ?mucus plugging iso AMS 2/2 toxic metabolic encephalopathy Now pending trach and peg    Febrile overnight >102  N: Myoclonus, ?Seizure do  Toxic metabolic encephalopathy  MRI negative for acute pathology  Petechial Hemorrhages   - Daily sedation vacation  - Prop 40   - Lexapro and Keppra  - Optho for hemorrhages  CV: Sedation related hypotension  Shock: Cardiogenic v. distributive v. vasoplegic   AF with RVR  CAD s/p CABG and PCI (2022)   - TTE, Trop   - POCUS  - Levo with MAP goal >65  - ASA for CAD, holding Brillinta (plan to resume post procedure)  P Acute hypoxemic/hypercapnic respiratory failure requiring intubation mechanical ventilation  Multifocal Pneumonia  MSSA pneumonia  Pleural effusions  Aspiration   COVID  - Lung protective ventilation  - Daily PSV, tolerating 5/5, 30%  - Scant secretions, minimal suctioning requirement  - Duonebs q 6 hours, and airway clearance: inhaled hypertonic saline q 12  - Trach plan: possibly tomorrow  GI: Acute cholecystitis s/p biliary tube  SMA stent   - Planned for PEG  - Resume TF pending procedural planning  Renal:   - Trend creatinine  - Replete lytes as needed  - Diuresis as tolerated  ID: Acute asa s/p perc asa  Recurrent fevers  - Infectious work up pending  - Zosyn   Heme: Anemia of chronic disease    DVT: resume based on procedural plan  GI: TF, PPI 40 BID iso GIB    Full code  Prognosis guarded. Updated family at bedside

## 2024-02-12 NOTE — CHART NOTE - NSCHARTNOTEFT_GEN_A_CORE
:  Chang Fonseca Fellow     INDICATION:    [x] Shock    [x] Acute Hypoxic Respiratory failure    [ ] Other:       PROCEDURE:    [x] LIMITED ECHO    [x] LIMITED CHEST    [ ] LIMITED ABDOMINAL    [ ] OTHER      FINDINGS:  Cardiac:   LV: Moderately decreased LV function with apical ballooning. no RWMA observed. LVOT VTI 11-14.   RV: Normal RV size.   Pericardium: No pericardial effusion.      Pulmonary:   Left: small to moderate pleural effusion.  Right: Small pleural effusion. too small to tap.     INTERPRETATION:  Newly decreased LV function with global hypokinesis.   Small bilateral pleural effusions L>R.       Images stored on [employer]

## 2024-02-12 NOTE — PROGRESS NOTE ADULT - ASSESSMENT
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     EKG SR RBBB (old per family)     1) Hypoxia   - s/p bronch   - Rt pleural effusion   - held lasix given Hypernatremia and worsening creat  - intubated again , AMS    - f/u neuro recs MRI brain shows no acute disease  - for trach and peg     2) CAD s/p CABG  - p/w chest pain. EKG non ischemic , trop -sera   - c/w metoprolol   - chest pains  Trop mildly elevated and trending down likely sec to kidney disease,   - holding brilinta, was on it for SMA stent placed in 12/2023, held 2/2 anticipation for PEG placement, restart when no further invasive procedures planned  -TTE 2/12 with  mod decrease LV systolic function, new. euvolemic on exam, if oxygen requirements on vent increase consider diuretics. given current condition will medically manage    3) Covid +  - s/p remdesivir   - c/w supportive management   - t/t per primary team     4) Abd distension   -CTA AP obtained which showed  partially occlusive calcified and non-calcified plaque just distal to take-off of the SMA, with estimated at least 75% luminal narrowing. Limited evaluation of its distal branches. 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   -on Protonix IV q 12

## 2024-02-12 NOTE — PROGRESS NOTE ADULT - SUBJECTIVE AND OBJECTIVE BOX
Aashish Boyer MD  Interventional Cardiology / Endovascular Specialist  [place] Office : [phone] [place] [id number] Tel:   [place] Office : [address] [facility] [address]  Tel: [phone]      Subjective/Overnight events: Patient lying in bed remains intubated in ICU    MEDICATIONS:  aspirin  chewable 81 milliGRAM(s) Enteral Tube daily  doxazosin 2 milliGRAM(s) Oral at bedtime  heparin   Injectable 5000 Unit(s) SubCutaneous every 8 hours  norepinephrine Infusion 0.05 MICROgram(s)/kG/Min IV Continuous <Continuous>    piperacillin/tazobactam IVPB.. 3.375 Gram(s) IV Intermittent every 8 hours    albuterol/ipratropium for Nebulization 3 milliLiter(s) Nebulizer every 6 hours  sodium chloride 3%  Inhalation 4 milliLiter(s) Inhalation two times a day    acetaminophen   IVPB .. 1000 milliGRAM(s) IV Intermittent once  escitalopram 10 milliGRAM(s) Oral daily  levETIRAcetam  IVPB 500 milliGRAM(s) IV Intermittent every 12 hours  propofol Infusion 10 MICROgram(s)/kG/Min IV Continuous <Continuous>    pantoprazole  Injectable 40 milliGRAM(s) IV Push every 12 hours  sucralfate suspension 1 Gram(s) Enteral Tube two times a day    dextrose 50% Injectable 25 Gram(s) IV Push once  dextrose 50% Injectable 25 Gram(s) IV Push once  dextrose 50% Injectable 12.5 Gram(s) IV Push once  dextrose Oral Gel 15 Gram(s) Oral once PRN  glucagon  Injectable 1 milliGRAM(s) IntraMuscular once  insulin glargine Injectable (LANTUS) 12 Unit(s) SubCutaneous <User Schedule>  insulin lispro (ADMELOG) corrective regimen sliding scale   SubCutaneous every 6 hours    artificial  tears Solution 1 Drop(s) Left EYE four times a day  chlorhexidine 0.12% Liquid 15 milliLiter(s) Oral Mucosa every 12 hours  chlorhexidine 2% Cloths 1 Application(s) Topical daily  dextrose 5%. 1000 milliLiter(s) IV Continuous <Continuous>  dextrose 5%. 1000 milliLiter(s) IV Continuous <Continuous>  petrolatum Ophthalmic Ointment 1 Application(s) Left EYE at bedtime  potassium phosphate / sodium phosphate Powder (PHOS-NaK) 1 Packet(s) Oral every 4 hours      PAST MEDICAL/SURGICAL HISTORY  PAST MEDICAL & SURGICAL HISTORY:  Hyperlipemia      Hypertension      Coronary Artery Disease      Diabetes Mellitus Type II      Stented Coronary Artery  total 5 stents, last stent 5/2019      Neuropathy      Myocardial infarction      Stroke  mild left facial numbness   no other residuals verbalized      Myoclonic jerking      Stage 3 chronic kidney disease      History of Cataract Extraction      Hx of CABG      H/O coronary angiogram      S/P coronary artery stent placement  1/6/09      S/P placement of cardiac pacemaker          SOCIAL HISTORY: Substance Use (street drugs): ( x ) never used  (  ) other:    FAMILY HISTORY:  No pertinent family history in first degree relatives            PHYSICAL EXAM:  T(C): 37.1 (02-12-24 @ 08:00), Max: 38.9 (02-11-24 @ 20:00)  HR: 106 (02-12-24 @ 14:00) (88 - 109)  BP: 120/43 (02-12-24 @ 14:00) (92/45 - 143/57)  RR: 22 (02-12-24 @ 14:00) (14 - 28)  SpO2: 100% (02-12-24 @ 14:00) (94% - 100%)  Wt(kg): --  I&O's Summary    11 Feb 2024 07:01  -  12 Feb 2024 07:00  --------------------------------------------------------  IN: 2182.4 mL / OUT: 2225 mL / NET: -42.6 mL    12 Feb 2024 07:01  -  12 Feb 2024 15:19  --------------------------------------------------------  IN: 698.4 mL / OUT: 445 mL / NET: 253.4 mL        GENERAL: intubated  EYES:  conjunctiva and sclera clear  ENMT: No tonsillar erythema, exudates, or enlargement  Cardiovascular: Normal S1 S2, No JVD, No murmurs, No edema  Respiratory: Lungs clear to auscultation	  Gastrointestinal:  Soft  Extremities: No edema                                         7.5    9.59  )-----------( 377      ( 12 Feb 2024 02:40 )             24.3     02-12    132<L>  |  95<L>  |  31<H>  ----------------------------<  219<H>  4.6   |  25  |  1.41<H>    Ca    8.0<L>      12 Feb 2024 02:40  Phos  2.9     02-12  Mg     2.10     02-12    TPro  6.2  /  Alb  2.2<L>  /  TBili  0.3  /  DBili  x   /  AST  31  /  ALT  23  /  AlkPhos  96  02-12    proBNP:   Lipid Profile:   HgA1c:   TSH:     Consultant(s) Notes Reviewed:  [x ] YES  [ ] NO    Care Discussed with Consultants/Other Providers [ x] YES  [ ] NO    Imaging Personally Reviewed independently:  [x] YES  [ ] NO    All labs, radiologic studies, vitals, orders and medications list reviewed. Patient is seen and examined at bedside. Case discussed with medical team.

## 2024-02-12 NOTE — PROGRESS NOTE ADULT - ASSESSMENT
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     PLAN  =====Neurologic=====  #CVA  - prior CVA x3 with no residual deficits    #myoclonic jerks  - on Keppra 500 mg BID, per neuro wishing to wean however per family request will continue at 500 mg BID, but now off valproate per neuro    - also per neuro, use precedex as needed for clinical sedation   --2/10, OK to start propofol as trach planned for 2/12    - holding home  gabapentin and memantine in setting of somnolence  - continue lexapro     #AMS  - CT/CTA negative for stroke. EEG now off   - MRI brain - unremarkable findings. Family now amenable to Trach/PEG. Will arrange Trach next week, tentative 2/19       =====Pulmonary=====  #Prior aspiration PNA  - s/p zosyn for aspiration PNA from 1/20- 1/28     #COVID  - s/p paxlovid and 1 dose remdesivir while inpatient    #Pleural effusions b/l  - CT chest from 1/16 showing new small bilateral pleural effusions/ atelectasis/ patchy bilateral ground-glass opacities are consistent with pulmonary edema.  - Diuresis PRN based on amount of pleural effusions on POCUS    #AHRF  - Intubated for airway protection in s/o AMS. Sedated  - Holding brilinta for possible procedure. Will need to restart ASAP after trach/PEG  --trach tentatively for Monday 2/12. NPO @ [facility] w/ Pre-op labs.     =====Cardiovascular=====  Normotensive, not requiring pressors or midodrine - stopped midodrine on 2/11    #HTN  - on home amlodipine and metoprolol currently holding     #CAD  - on Brilinta (holding) aspirin and ranolazine continue   - as per vascular okay to hold Brilinta for possible pleural effusion thoracentesis  -have not heard back from cardiology regarding Brilinta / last stent 2022 , will hold   -Need to restart brilinta ASAP after Trach/PEG    #Afib  - pt with known history of pAF, first observed 2/1  - can consider starting AC and/or rate control if persists     =====GI=====  #upper GI bleed  - s/p EGD showing dieulafoy lesion and esophagitis likely 2/2 NGT irritation s/p 2 clips  - on protonix IV BID continue   - CT on 1/25 with cholelithiasis and sludge HIDA on 1/26 confirming likely acute choley, s/p IR perc cholecystostomy 1/26   - Discussed w/ family, want trach/PEG. Discussed w/ GI - Only will do PEG 24-48 hours after trach placed. Recommend reaching out when trach date is set  - Trach on 2/12, will need to e-mail GI after trach placed. Informed GI of date on 2/9    #Diet  - tube feeds NGT   - Per GI, re-email GI for consult for PEG after trach placed, plan for PEG 24-48 hrs after trach.     =====Renal/=====  #Electrolytes  - Maintain K>4, Phos>3, Mag>2, iCal>1  - baseline cr 1.5, at baseline  - on free water flushes for hyperNa. Trend BMP/Na - adjsted to 200 q12h   - Diuresis PRN - Will give 40 mg IVP Lasix 2/11.     Trial of Void as of 2/9    =====Endocrine=====  #DM2  - on lantus 12U and q6 SSI  - a1c 9.3, glucose wnl inpatient     =====Infectious Disease=====  #COVID   - s/p paxlovid and 1 dose remdesivir  # PNA  - CT chest showing ground glass opacities  - s/p zosyn  - intermittent episodes of fevers, likely source GI given choleycystitis? culture NGTD  - meropenem 5d course thru 2/1   - now monitoring off antibiotics  - newly hypothermic on 2/11, UA and BCx sent    =====Heme/Onc=====  #DVT Ppx  - heparin sub-q    =====Ethics=====  FULL CODE.  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     PLAN  =====Neurologic=====  #CVA  - prior CVA x3 with no residual deficits    #myoclonic jerks  - on Keppra 500 mg BID, per neuro wishing to wean however per family request will continue at 500 mg BID, but now off valproate per neuro    - also per neuro, use precedex as needed for clinical sedation   -Started prop on 2/10, in anticipation of procedure  - holding home  gabapentin and memantine in setting of somnolence  - continue lexapro     #AMS  - CT/CTA negative for stroke. EEG now off   - MRI brain - unremarkable findings. Family now amenable to Trach/PEG. Will arrange Trach next week      =====Pulmonary=====  #Prior aspiration PNA  - s/p zosyn for aspiration PNA from 1/20- 1/28     #COVID  - s/p paxlovid and 1 dose remdesivir while inpatient    #Pleural effusions b/l  - CT chest from 1/16 showing new small bilateral pleural effusions/ atelectasis/ patchy bilateral ground-glass opacities are consistent with pulmonary edema.  - Diuresis PRN based on amount of pleural effusions on POCUS    #AHRF  - Intubated for airway protection in s/o AMS. Sedated  - Holding brilinta for possible procedure. Will need to restart ASAP after trach/PEG  --trach tentatively this week. likely not 2/12 but may be in the coming days - TBD, NPO @ [facility] w/ Pre-op labs.     =====Cardiovascular=====  Normotensive, not requiring pressors or midodrine - stopped midodrine on 2/11    Poor cardiac function noted on POCUS, f/u trops sent 2/12    #HTN  - on home amlodipine and metoprolol currently holding     #CAD  - on Brilinta (holding) aspirin and ranolazine continue   - as per vascular okay to hold Brilinta for possible pleural effusion thoracentesis  -have not heard back from cardiology regarding Brilinta / last stent 2022 , will hold   -Need to restart brilinta ASAP after Trach/PEG    #Afib  - pt with known history of pAF, first observed 2/1  - can consider starting AC and/or rate control if persists     =====GI=====  #upper GI bleed  - s/p EGD showing dieulafoy lesion and esophagitis likely 2/2 NGT irritation s/p 2 clips  - on protonix IV BID continue   - CT on 1/25 with cholelithiasis and sludge HIDA on 1/26 confirming likely acute choley, s/p IR perc cholecystostomy 1/26   - Discussed w/ family, want trach/PEG. Discussed w/ GI - Only will do PEG 24-48 hours after trach placed. Recommend reaching out when trach date is set  - Trach on 2/12, will need to e-mail GI after trach placed. Informed GI of date on 2/9    #Diet  - tube feeds NGT   - Per GI, re-email GI for consult for PEG after trach placed, plan for PEG 24-48 hrs after trach.     =====Renal/=====  #Electrolytes  - Maintain K>4, Phos>3, Mag>2, iCal>1  - baseline cr 1.5, at baseline  - on free water flushes for hyperNa. Trend BMP/Na - adjsted to 200 q12h   - Diuresis PRN - Will give 40 mg IVP Lasix 2/11.     Trial of Void as of 2/9    =====Endocrine=====  #DM2  - on lantus 12U and q6 SSI  - a1c 9.3, glucose wnl inpatient     =====Infectious Disease=====  #COVID   - s/p paxlovid and 1 dose remdesivir  # PNA  - CT chest showing ground glass opacities  - s/p zosyn  - intermittent episodes of fevers, likely source GI given choleycystitis? culture NGTD  - meropenem 5d course thru 2/1   - now monitoring off antibiotics  - newly hypothermic on 2/11, UA and BCx sent, Sputum Cx    =====Heme/Onc=====  #DVT Ppx  - heparin sub-q held in s/o procedure, restart when able    =====Ethics=====  FULL CODE.  After family discussion, Trach/PEG

## 2024-02-12 NOTE — PROGRESS NOTE ADULT - SUBJECTIVE AND OBJECTIVE BOX
***************************************************************  Candel[facility] (Renee) Shantel PGY2  MICU  ***************************************************************    SHAIK CHARANJIT  89y  MRN: [id number]    Patient is a 89y old  Male who presents with a chief complaint of COVID19, Chest pain (11 Feb 2024 12:49)      Subjective: no events ON. Denies fever, CP, SOB, abn pain, N/V, dysuria. Tolerating diet.      MEDICATIONS  (STANDING):  acetaminophen   IVPB .. 1000 milliGRAM(s) IV Intermittent once  albuterol/ipratropium for Nebulization 3 milliLiter(s) Nebulizer every 6 hours  artificial  tears Solution 1 Drop(s) Left EYE four times a day  aspirin  chewable 81 milliGRAM(s) Enteral Tube daily  chlorhexidine 0.12% Liquid 15 milliLiter(s) Oral Mucosa every 12 hours  chlorhexidine 2% Cloths 1 Application(s) Topical daily  dextrose 5%. 1000 milliLiter(s) (50 mL/Hr) IV Continuous <Continuous>  dextrose 5%. 1000 milliLiter(s) (100 mL/Hr) IV Continuous <Continuous>  dextrose 50% Injectable 25 Gram(s) IV Push once  dextrose 50% Injectable 12.5 Gram(s) IV Push once  dextrose 50% Injectable 25 Gram(s) IV Push once  doxazosin 2 milliGRAM(s) Oral at bedtime  escitalopram 10 milliGRAM(s) Oral daily  glucagon  Injectable 1 milliGRAM(s) IntraMuscular once  insulin glargine Injectable (LANTUS) 12 Unit(s) SubCutaneous <User Schedule>  insulin lispro (ADMELOG) corrective regimen sliding scale   SubCutaneous every 6 hours  levETIRAcetam  IVPB 500 milliGRAM(s) IV Intermittent every 12 hours  norepinephrine Infusion 0.05 MICROgram(s)/kG/Min (3.72 mL/Hr) IV Continuous <Continuous>  pantoprazole  Injectable 40 milliGRAM(s) IV Push every 12 hours  petrolatum Ophthalmic Ointment 1 Application(s) Left EYE at bedtime  piperacillin/tazobactam IVPB.- 3.375 Gram(s) IV Intermittent once  piperacillin/tazobactam IVPB.. 3.375 Gram(s) IV Intermittent every 8 hours  propofol Infusion 10 MICROgram(s)/kG/Min (4.76 mL/Hr) IV Continuous <Continuous>  sodium chloride 3%  Inhalation 4 milliLiter(s) Inhalation two times a day  sucralfate suspension 1 Gram(s) Enteral Tube two times a day    MEDICATIONS  (PRN):  dextrose Oral Gel 15 Gram(s) Oral once PRN Blood Glucose LESS THAN 70 milliGRAM(s)/deciliter      Objective:    Vitals: Vital Signs Last 24 Hrs  T(C): 37.4 (02-12-24 @ 04:00), Max: 38.9 (02-11-24 @ 20:00)  T(F): 99.3 (02-12-24 @ 04:00), Max: 102.1 (02-11-24 @ 20:00)  HR: 104 (02-12-24 @ 06:00) (58 - 109)  BP: 124/48 (02-12-24 @ 06:00) (92/45 - 139/65)  BP(mean): 65 (02-12-24 @ 06:00) (56 - 82)  RR: 16 (02-12-24 @ 06:00) (12 - 27)  SpO2: 100% (02-12-24 @ 06:00) (95% - 100%)            I&O's Summary    11 Feb 2024 07:01  -  12 Feb 2024 07:00  --------------------------------------------------------  IN: 2157.5 mL / OUT: 2155 mL / NET: 2.5 mL        PHYSICAL EXAM:  GENERAL: NAD  HEAD:  Atraumatic, Normocephalic  EYES: EOMI, conjunctiva and sclera clear  CHEST/LUNG: Clear to auscultation bilaterally; No rales, rhonchi, wheezing, or rubs  HEART: Regular rate and rhythm; No murmurs, rubs, or gallops  ABDOMEN: Soft, Nontender, Nondistended;   SKIN: No rashes or lesions  NERVOUS SYSTEM:  Alert & Oriented X3, no focal deficits    LABS:  02-12    132<L>  |  95<L>  |  31<H>  ----------------------------<  219<H>  4.6   |  25  |  1.41<H>  02-11    136  |  97<L>  |  30<H>  ----------------------------<  172<H>  4.8   |  26  |  1.43<H>  02-11    138  |  102  |  27<H>  ----------------------------<  121<H>  4.0   |  27  |  1.29    Ca    8.0<L>      12 Feb 2024 02:40  Ca    8.1<L>      11 Feb 2024 23:11  Ca    8.3<L>      11 Feb 2024 00:24  Phos  2.9     02-12  Mg     2.10     02-12    TPro  6.2  /  Alb  2.2<L>  /  TBili  0.3  /  DBili  x   /  AST  31  /  ALT  23  /  AlkPhos  96  02-12  TPro  6.5  /  Alb  2.4<L>  /  TBili  0.2  /  DBili  x   /  AST  28  /  ALT  23  /  AlkPhos  97  02-11  TPro  6.1  /  Alb  2.3<L>  /  TBili  0.3  /  DBili  x   /  AST  26  /  ALT  20  /  AlkPhos  81  02-11      PT/INR - ( 12 Feb 2024 02:40 )   PT: 11.9 sec;   INR: 1.06 ratio         PTT - ( 12 Feb 2024 02:40 )  PTT:31.8 sec              Urinalysis Basic - ( 12 Feb 2024 02:40 )    Color: x / Appearance: x / SG: x / pH: x  Gluc: 219 mg/dL / Ketone: x  / Bili: x / Urobili: x   Blood: x / Protein: x / Nitrite: x   Leuk Esterase: x / RBC: x / WBC x   Sq Epi: x / Non Sq Epi: x / Bacteria: x                              7.5    9.59  )-----------( 377      ( 12 Feb 2024 02:40 )             24.3                         7.8    8.35  )-----------( 369      ( 11 Feb 2024 23:11 )             24.1                         7.1    5.57  )-----------( 294      ( 11 Feb 2024 00:24 )             22.4     CAPILLARY BLOOD GLUCOSE      POCT Blood Glucose.: 200 mg/dL (12 Feb 2024 05:53)  POCT Blood Glucose.: 187 mg/dL (11 Feb 2024 23:31)  POCT Blood Glucose.: 179 mg/dL (11 Feb 2024 17:37)  POCT Blood Glucose.: 186 mg/dL (11 Feb 2024 11:36)      RADIOLOGY & ADDITIONAL TESTS:    Imaging Personally Reviewed:  [x ] YES  [ ] NO    Consultants involved in case:   Consultant(s) Notes Reviewed:  [ x] YES  [ ] NO:   Care Discussed with Consultants/Other Providers [x ] YES  [ ] NO         ***************************************************************  [facility]andel[facility] (Renee) Shantel PGY2  MIC[facility]  ***************************************************************    SHAIK CHARANJIT  89y  MRN: [id number]    Patient is a 89y old  Male who presents with a chief complaint of COVID19, Chest pain (11 Feb 2024 12:49)      Subjective: Febrile overnight, given tylenol. Getting Zosyn. No acute issues, sedated.     MEDICATIONS  (STANDING):  acetaminophen   IVPB .. 1000 milliGRAM(s) IV Intermittent once  albuterol/ipratropium for Nebulization 3 milliLiter(s) Nebulizer every 6 hours  artificial  tears Solution 1 Drop(s) Left EYE four times a day  aspirin  chewable 81 milliGRAM(s) Enteral Tube daily  chlorhexidine 0.12% Liquid 15 milliLiter(s) Oral Mucosa every 12 hours  chlorhexidine 2% Cloths 1 Application(s) Topical daily  dextrose 5%. 1000 milliLiter(s) (50 mL/Hr) IV Continuous <Continuous>  dextrose 5%. 1000 milliLiter(s) (100 mL/Hr) IV Continuous <Continuous>  dextrose 50% Injectable 25 Gram(s) IV Push once  dextrose 50% Injectable 12.5 Gram(s) IV Push once  dextrose 50% Injectable 25 Gram(s) IV Push once  doxazosin 2 milliGRAM(s) Oral at bedtime  escitalopram 10 milliGRAM(s) Oral daily  glucagon  Injectable 1 milliGRAM(s) IntraMuscular once  insulin glargine Injectable (LANTUS) 12 Unit(s) SubCutaneous <User Schedule>  insulin lispro (ADMELOG) corrective regimen sliding scale   SubCutaneous every 6 hours  levETIRAcetam  IVPB 500 milliGRAM(s) IV Intermittent every 12 hours  norepinephrine Infusion 0.05 MICROgram(s)/kG/Min (3.72 mL/Hr) IV Continuous <Continuous>  pantoprazole  Injectable 40 milliGRAM(s) IV Push every 12 hours  petrolatum Ophthalmic Ointment 1 Application(s) Left EYE at bedtime  piperacillin/tazobactam IVPB.- 3.375 Gram(s) IV Intermittent once  piperacillin/tazobactam IVPB.. 3.375 Gram(s) IV Intermittent every 8 hours  propofol Infusion 10 MICROgram(s)/kG/Min (4.76 mL/Hr) IV Continuous <Continuous>  sodium chloride 3%  Inhalation 4 milliLiter(s) Inhalation two times a day  sucralfate suspension 1 Gram(s) Enteral Tube two times a day    MEDICATIONS  (PRN):  dextrose Oral Gel 15 Gram(s) Oral once PRN Blood Glucose LESS THAN 70 milliGRAM(s)/deciliter      Objective:    Vitals: Vital Signs Last 24 Hrs  T(C): 37.4 (02-12-24 @ 04:00), Max: 38.9 (02-11-24 @ 20:00)  T(F): 99.3 (02-12-24 @ 04:00), Max: 102.1 (02-11-24 @ 20:00)  HR: 104 (02-12-24 @ 06:00) (58 - 109)  BP: 124/48 (02-12-24 @ 06:00) (92/45 - 139/65)  BP(mean): 65 (02-12-24 @ 06:00) (56 - 82)  RR: 16 (02-12-24 @ 06:00) (12 - 27)  SpO2: 100% (02-12-24 @ 06:00) (95% - 100%)            I&O's Summary    11 Feb 2024 07:01  -  12 Feb 2024 07:00  --------------------------------------------------------  IN: 2157.5 mL / OUT: 2155 mL / NET: 2.5 mL        PHYSICAL EXAM:  GENERAL: NAD. Remains intubated and sedated  HEAD:  Atraumatic, Normocephalic  EYES: EOMI, conjunctiva and sclera clear  CHEST/LUNG: Clear to auscultation bilaterally; No rales, rhonchi, wheezing, or rubs  HEART: Regular rate and rhythm; No murmurs, rubs, or gallops  ABDOMEN: Soft, Nontender, Nondistended;   SKIN: No rashes or lesions  NERVOUS SYSTEM:  Alert & Oriented X0, sedated, no focal deficits    LABS:  02-12    132<L>  |  95<L>  |  31<H>  ----------------------------<  219<H>  4.6   |  25  |  1.41<H>  02-11    136  |  97<L>  |  30<H>  ----------------------------<  172<H>  4.8   |  26  |  1.43<H>  02-11    138  |  102  |  27<H>  ----------------------------<  121<H>  4.0   |  27  |  1.29    Ca    8.0<L>      12 Feb 2024 02:40  Ca    8.1<L>      11 Feb 2024 23:11  Ca    8.3<L>      11 Feb 2024 00:24  Phos  2.9     02-12  Mg     2.10     02-12    TPro  6.2  /  Alb  2.2<L>  /  TBili  0.3  /  DBili  x   /  AST  31  /  ALT  23  /  AlkPhos  96  02-12  TPro  6.5  /  Alb  2.4<L>  /  TBili  0.2  /  DBili  x   /  AST  28  /  ALT  23  /  AlkPhos  97  02-11  TPro  6.1  /  Alb  2.3<L>  /  TBili  0.3  /  DBili  x   /  AST  26  /  ALT  20  /  AlkPhos  81  02-11      PT/INR - ( 12 Feb 2024 02:40 )   PT: 11.9 sec;   INR: 1.06 ratio         PTT - ( 12 Feb 2024 02:40 )  PTT:31.8 sec              Urinalysis Basic - ( 12 Feb 2024 02:40 )    Color: x / Appearance: x / SG: x / pH: x  Gluc: 219 mg/dL / Ketone: x  / Bili: x / Urobili: x   Blood: x / Protein: x / Nitrite: x   Leuk Esterase: x / RBC: x / WBC x   Sq Epi: x / Non Sq Epi: x / Bacteria: x                              7.5    9.59  )-----------( 377      ( 12 Feb 2024 02:40 )             24.3                         7.8    8.35  )-----------( 369      ( 11 Feb 2024 23:11 )             24.1                         7.1    5.57  )-----------( 294      ( 11 Feb 2024 00:24 )             22.4     CAPILLARY BLOOD GLUCOSE      POCT Blood Glucose.: 200 mg/dL (12 Feb 2024 05:53)  POCT Blood Glucose.: 187 mg/dL (11 Feb 2024 23:31)  POCT Blood Glucose.: 179 mg/dL (11 Feb 2024 17:37)  POCT Blood Glucose.: 186 mg/dL (11 Feb 2024 11:36)      RADIOLOGY & ADDITIONAL TESTS:    Imaging Personally Reviewed:  [x ] YES  [ ] NO    Consultants involved in case:   Consultant(s) Notes Reviewed:  [ x] YES  [ ] NO:   Care Discussed with Consultants/Other Providers [x ] YES  [ ] NO

## 2024-02-13 ENCOUNTER — RESULT REVIEW (OUTPATIENT)
Age: 89
End: 2024-02-13

## 2024-02-13 LAB
ALBUMIN SERPL ELPH-MCNC: 2.3 G/DL — LOW (ref 3.3–5)
ALP SERPL-CCNC: 101 U/L — SIGNIFICANT CHANGE UP (ref 40–120)
ALT FLD-CCNC: 20 U/L — SIGNIFICANT CHANGE UP (ref 4–41)
ANION GAP SERPL CALC-SCNC: 9 MMOL/L — SIGNIFICANT CHANGE UP (ref 7–14)
APTT BLD: 32.3 SEC — SIGNIFICANT CHANGE UP (ref 24.5–35.6)
AST SERPL-CCNC: 25 U/L — SIGNIFICANT CHANGE UP (ref 4–40)
BASE EXCESS BLDV CALC-SCNC: 6 MMOL/L — HIGH (ref -2–3)
BILIRUB SERPL-MCNC: 0.3 MG/DL — SIGNIFICANT CHANGE UP (ref 0.2–1.2)
BLOOD GAS VENOUS COMPREHENSIVE RESULT: SIGNIFICANT CHANGE UP
BUN SERPL-MCNC: 27 MG/DL — HIGH (ref 7–23)
CALCIUM SERPL-MCNC: 7.9 MG/DL — LOW (ref 8.4–10.5)
CHLORIDE BLDV-SCNC: 98 MMOL/L — SIGNIFICANT CHANGE UP (ref 96–108)
CHLORIDE SERPL-SCNC: 96 MMOL/L — LOW (ref 98–107)
CK MB BLD-MCNC: 5.2 % — HIGH (ref 0–2.5)
CK MB CFR SERPL CALC: 1.2 NG/ML — SIGNIFICANT CHANGE UP
CK SERPL-CCNC: 23 U/L — LOW (ref 30–200)
CO2 BLDV-SCNC: 32.6 MMOL/L — HIGH (ref 22–26)
CO2 SERPL-SCNC: 28 MMOL/L — SIGNIFICANT CHANGE UP (ref 22–31)
CREAT SERPL-MCNC: 1.48 MG/DL — HIGH (ref 0.5–1.3)
EGFR: 45 ML/MIN/1.73M2 — LOW
GAS PNL BLDV: 134 MMOL/L — LOW (ref 136–145)
GLUCOSE BLDC GLUCOMTR-MCNC: 163 MG/DL — HIGH (ref 70–99)
GLUCOSE BLDC GLUCOMTR-MCNC: 195 MG/DL — HIGH (ref 70–99)
GLUCOSE BLDC GLUCOMTR-MCNC: 217 MG/DL — HIGH (ref 70–99)
GLUCOSE BLDC GLUCOMTR-MCNC: 217 MG/DL — HIGH (ref 70–99)
GLUCOSE BLDV-MCNC: 200 MG/DL — HIGH (ref 70–99)
GLUCOSE SERPL-MCNC: 211 MG/DL — HIGH (ref 70–99)
HCO3 BLDV-SCNC: 31 MMOL/L — HIGH (ref 22–29)
HCT VFR BLD CALC: 23 % — LOW (ref 39–50)
HCT VFR BLDA CALC: 23 % — LOW (ref 39–51)
HGB BLD CALC-MCNC: 7.5 G/DL — LOW (ref 12.6–17.4)
HGB BLD-MCNC: 7.1 G/DL — LOW (ref 13–17)
INR BLD: 1.07 RATIO — SIGNIFICANT CHANGE UP (ref 0.85–1.18)
LACTATE BLDV-MCNC: 1.3 MMOL/L — SIGNIFICANT CHANGE UP (ref 0.5–2)
MAGNESIUM SERPL-MCNC: 2.1 MG/DL — SIGNIFICANT CHANGE UP (ref 1.6–2.6)
MCHC RBC-ENTMCNC: 28.9 PG — SIGNIFICANT CHANGE UP (ref 27–34)
MCHC RBC-ENTMCNC: 30.9 GM/DL — LOW (ref 32–36)
MCV RBC AUTO: 93.5 FL — SIGNIFICANT CHANGE UP (ref 80–100)
MRSA PCR RESULT.: SIGNIFICANT CHANGE UP
NRBC # BLD: 0 /100 WBCS — SIGNIFICANT CHANGE UP (ref 0–0)
NRBC # FLD: 0 K/UL — SIGNIFICANT CHANGE UP (ref 0–0)
PCO2 BLDV: 48 MMHG — SIGNIFICANT CHANGE UP (ref 42–55)
PH BLDV: 7.42 — SIGNIFICANT CHANGE UP (ref 7.32–7.43)
PHOSPHATE SERPL-MCNC: 3.5 MG/DL — SIGNIFICANT CHANGE UP (ref 2.5–4.5)
PLATELET # BLD AUTO: 358 K/UL — SIGNIFICANT CHANGE UP (ref 150–400)
PO2 BLDV: 57 MMHG — HIGH (ref 25–45)
POTASSIUM BLDV-SCNC: 4.4 MMOL/L — SIGNIFICANT CHANGE UP (ref 3.5–5.1)
POTASSIUM SERPL-MCNC: 4.5 MMOL/L — SIGNIFICANT CHANGE UP (ref 3.5–5.3)
POTASSIUM SERPL-SCNC: 4.5 MMOL/L — SIGNIFICANT CHANGE UP (ref 3.5–5.3)
PROT SERPL-MCNC: 6.3 G/DL — SIGNIFICANT CHANGE UP (ref 6–8.3)
PROTHROM AB SERPL-ACNC: 12 SEC — SIGNIFICANT CHANGE UP (ref 9.5–13)
RBC # BLD: 2.46 M/UL — LOW (ref 4.2–5.8)
RBC # FLD: 18 % — HIGH (ref 10.3–14.5)
S AUREUS DNA NOSE QL NAA+PROBE: DETECTED
SAO2 % BLDV: 87.9 % — SIGNIFICANT CHANGE UP (ref 67–88)
SODIUM SERPL-SCNC: 133 MMOL/L — LOW (ref 135–145)
TROPONIN T, HIGH SENSITIVITY RESULT: 87 NG/L — CRITICAL HIGH
WBC # BLD: 7.35 K/UL — SIGNIFICANT CHANGE UP (ref 3.8–10.5)
WBC # FLD AUTO: 7.35 K/UL — SIGNIFICANT CHANGE UP (ref 3.8–10.5)

## 2024-02-13 PROCEDURE — 99291 CRITICAL CARE FIRST HOUR: CPT | Mod: 25

## 2024-02-13 PROCEDURE — 31645 BRNCHSC W/THER ASPIR 1ST: CPT | Mod: GC

## 2024-02-13 PROCEDURE — 88305 TISSUE EXAM BY PATHOLOGIST: CPT | Mod: 26

## 2024-02-13 PROCEDURE — 88112 CYTOPATH CELL ENHANCE TECH: CPT | Mod: 26

## 2024-02-13 PROCEDURE — 76536 US EXAM OF HEAD AND NECK: CPT | Mod: 26,GC

## 2024-02-13 PROCEDURE — 31600 PLANNED TRACHEOSTOMY: CPT | Mod: GC

## 2024-02-13 PROCEDURE — 71045 X-RAY EXAM CHEST 1 VIEW: CPT | Mod: 26

## 2024-02-13 PROCEDURE — 31624 DX BRONCHOSCOPE/LAVAGE: CPT | Mod: GC

## 2024-02-13 PROCEDURE — 88312 SPECIAL STAINS GROUP 1: CPT | Mod: 26

## 2024-02-13 RX ORDER — LIDOCAINE HYDROCHLORIDE AND EPINEPHRINE 10; 10 MG/ML; UG/ML
20 INJECTION, SOLUTION INFILTRATION; PERINEURAL ONCE
Refills: 0 | Status: COMPLETED | OUTPATIENT
Start: 2024-02-13 | End: 2024-02-13

## 2024-02-13 RX ORDER — TRANEXAMIC ACID 100 MG/ML
10 INJECTION, SOLUTION INTRAVENOUS ONCE
Refills: 0 | Status: DISCONTINUED | OUTPATIENT
Start: 2024-02-13 | End: 2024-02-16

## 2024-02-13 RX ORDER — FUROSEMIDE 40 MG
20 TABLET ORAL ONCE
Refills: 0 | Status: COMPLETED | OUTPATIENT
Start: 2024-02-13 | End: 2024-02-13

## 2024-02-13 RX ORDER — PROPOFOL 10 MG/ML
10 INJECTION, EMULSION INTRAVENOUS
Qty: 1000 | Refills: 0 | Status: DISCONTINUED | OUTPATIENT
Start: 2024-02-13 | End: 2024-02-16

## 2024-02-13 RX ORDER — MIDAZOLAM HYDROCHLORIDE 1 MG/ML
8 INJECTION, SOLUTION INTRAMUSCULAR; INTRAVENOUS ONCE
Refills: 0 | Status: DISCONTINUED | OUTPATIENT
Start: 2024-02-13 | End: 2024-02-13

## 2024-02-13 RX ORDER — MIDODRINE HYDROCHLORIDE 2.5 MG/1
10 TABLET ORAL EVERY 8 HOURS
Refills: 0 | Status: DISCONTINUED | OUTPATIENT
Start: 2024-02-13 | End: 2024-02-14

## 2024-02-13 RX ORDER — FENTANYL CITRATE 50 UG/ML
0.5 INJECTION INTRAVENOUS
Qty: 2500 | Refills: 0 | Status: DISCONTINUED | OUTPATIENT
Start: 2024-02-13 | End: 2024-02-15

## 2024-02-13 RX ORDER — MUPIROCIN 20 MG/G
1 OINTMENT TOPICAL EVERY 12 HOURS
Refills: 0 | Status: DISCONTINUED | OUTPATIENT
Start: 2024-02-13 | End: 2024-02-13

## 2024-02-13 RX ORDER — MUPIROCIN 20 MG/G
1 OINTMENT TOPICAL
Refills: 0 | Status: COMPLETED | OUTPATIENT
Start: 2024-02-13 | End: 2024-02-18

## 2024-02-13 RX ORDER — CISATRACURIUM BESYLATE 2 MG/ML
20 INJECTION INTRAVENOUS ONCE
Refills: 0 | Status: COMPLETED | OUTPATIENT
Start: 2024-02-13 | End: 2024-02-13

## 2024-02-13 RX ADMIN — Medication 1 DROP(S): at 06:15

## 2024-02-13 RX ADMIN — Medication 4: at 18:52

## 2024-02-13 RX ADMIN — MUPIROCIN 1 APPLICATION(S): 20 OINTMENT TOPICAL at 19:18

## 2024-02-13 RX ADMIN — Medication 3 MILLILITER(S): at 09:40

## 2024-02-13 RX ADMIN — Medication 20 MILLIGRAM(S): at 13:14

## 2024-02-13 RX ADMIN — SODIUM CHLORIDE 4 MILLILITER(S): 9 INJECTION INTRAMUSCULAR; INTRAVENOUS; SUBCUTANEOUS at 21:13

## 2024-02-13 RX ADMIN — Medication 1 DROP(S): at 23:34

## 2024-02-13 RX ADMIN — Medication 1 DROP(S): at 13:06

## 2024-02-13 RX ADMIN — Medication 1 DROP(S): at 17:29

## 2024-02-13 RX ADMIN — PIPERACILLIN AND TAZOBACTAM 25 GRAM(S): 4; .5 INJECTION, POWDER, LYOPHILIZED, FOR SOLUTION INTRAVENOUS at 10:00

## 2024-02-13 RX ADMIN — Medication 3 MILLILITER(S): at 21:13

## 2024-02-13 RX ADMIN — PIPERACILLIN AND TAZOBACTAM 25 GRAM(S): 4; .5 INJECTION, POWDER, LYOPHILIZED, FOR SOLUTION INTRAVENOUS at 18:51

## 2024-02-13 RX ADMIN — SODIUM CHLORIDE 4 MILLILITER(S): 9 INJECTION INTRAMUSCULAR; INTRAVENOUS; SUBCUTANEOUS at 09:40

## 2024-02-13 RX ADMIN — Medication 2: at 23:40

## 2024-02-13 RX ADMIN — Medication 1 APPLICATION(S): at 23:33

## 2024-02-13 RX ADMIN — CHLORHEXIDINE GLUCONATE 15 MILLILITER(S): 213 SOLUTION TOPICAL at 17:29

## 2024-02-13 RX ADMIN — FENTANYL CITRATE 3.97 MICROGRAM(S)/KG/HR: 50 INJECTION INTRAVENOUS at 19:27

## 2024-02-13 RX ADMIN — Medication 3.72 MICROGRAM(S)/KG/MIN: at 08:05

## 2024-02-13 RX ADMIN — CHLORHEXIDINE GLUCONATE 15 MILLILITER(S): 213 SOLUTION TOPICAL at 06:15

## 2024-02-13 RX ADMIN — MIDODRINE HYDROCHLORIDE 10 MILLIGRAM(S): 2.5 TABLET ORAL at 23:34

## 2024-02-13 RX ADMIN — PIPERACILLIN AND TAZOBACTAM 25 GRAM(S): 4; .5 INJECTION, POWDER, LYOPHILIZED, FOR SOLUTION INTRAVENOUS at 01:51

## 2024-02-13 RX ADMIN — Medication 2: at 06:25

## 2024-02-13 RX ADMIN — LEVETIRACETAM 400 MILLIGRAM(S): 250 TABLET, FILM COATED ORAL at 17:32

## 2024-02-13 RX ADMIN — Medication 4: at 13:04

## 2024-02-13 RX ADMIN — LIDOCAINE HYDROCHLORIDE AND EPINEPHRINE 20 MILLILITER(S): 10; 10 INJECTION, SOLUTION INFILTRATION; PERINEURAL at 16:15

## 2024-02-13 RX ADMIN — Medication 3.72 MICROGRAM(S)/KG/MIN: at 19:27

## 2024-02-13 RX ADMIN — CISATRACURIUM BESYLATE 20 MILLIGRAM(S): 2 INJECTION INTRAVENOUS at 16:15

## 2024-02-13 RX ADMIN — PROPOFOL 4.76 MICROGRAM(S)/KG/MIN: 10 INJECTION, EMULSION INTRAVENOUS at 08:04

## 2024-02-13 RX ADMIN — Medication 3 MILLILITER(S): at 15:45

## 2024-02-13 RX ADMIN — PROPOFOL 4.76 MICROGRAM(S)/KG/MIN: 10 INJECTION, EMULSION INTRAVENOUS at 17:28

## 2024-02-13 RX ADMIN — INSULIN GLARGINE 12 UNIT(S): 100 INJECTION, SOLUTION SUBCUTANEOUS at 13:05

## 2024-02-13 RX ADMIN — PROPOFOL 4.76 MICROGRAM(S)/KG/MIN: 10 INJECTION, EMULSION INTRAVENOUS at 19:27

## 2024-02-13 RX ADMIN — CHLORHEXIDINE GLUCONATE 1 APPLICATION(S): 213 SOLUTION TOPICAL at 13:06

## 2024-02-13 RX ADMIN — PANTOPRAZOLE SODIUM 40 MILLIGRAM(S): 20 TABLET, DELAYED RELEASE ORAL at 19:17

## 2024-02-13 RX ADMIN — PANTOPRAZOLE SODIUM 40 MILLIGRAM(S): 20 TABLET, DELAYED RELEASE ORAL at 06:21

## 2024-02-13 RX ADMIN — LEVETIRACETAM 400 MILLIGRAM(S): 250 TABLET, FILM COATED ORAL at 06:19

## 2024-02-13 RX ADMIN — Medication 2 MILLIGRAM(S): at 23:37

## 2024-02-13 RX ADMIN — Medication 3 MILLILITER(S): at 02:57

## 2024-02-13 RX ADMIN — Medication 1 GRAM(S): at 06:16

## 2024-02-13 RX ADMIN — FENTANYL CITRATE 3.97 MICROGRAM(S)/KG/HR: 50 INJECTION INTRAVENOUS at 17:29

## 2024-02-13 RX ADMIN — MIDAZOLAM HYDROCHLORIDE 8 MILLIGRAM(S): 1 INJECTION, SOLUTION INTRAMUSCULAR; INTRAVENOUS at 16:15

## 2024-02-13 NOTE — PROGRESS NOTE ADULT - ASSESSMENT
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) who presents to the hospital for COVID19 infection and chest pain  MABLE - Renal fxn improving  Hypophosphatemia- acceptable  Hypokalemia -  resolved  Hypertensive urgency -resolved -BP acceptable at present  Pulm - resp failure   s/p EEG  - eval noted  Hypernatremia - Na+ much improved    1 Renal - scr stable at present,  no objection to lasix 40mg IV PRN   continue to trend phos level daily   2 CV -wean pressors as able   3 Vasc - s/p SMA stent placement;   4 Sx - s/p HIDA + for acute asa, medical management  5 GI - s/p EGD clipping of bleeding duodenal ulcers ,on TF   6 Anemia- , continue to trend H/H; suggest PRBC for Hgb <7.0; transfuse per primary team   7 Pulm -vent as per icu , trach planned for today   8- ID -afebrile   9 Glycemic control as able     Faby Cummins DNP  [facility]  [phone]

## 2024-02-13 NOTE — CONSULT NOTE ADULT - CONSULT REASON
CP
airway clearance
HD Monitoring Postoperatively
Rule out mastoiditis
aphasia since extubation 1/30/24
prolonged ventilation
Covid 19 rx
Fever
Abdominal Pain
cholecystostomy tube
Mesenteric ischemia
eval for rehab
hematemesis
MABLE
Medical management

## 2024-02-13 NOTE — CONSULT NOTE ADULT - CONSULT REQUESTED BY NAME
Dr. Dinero
[place]
Medicine
[facility]
Vascular
Pulmonology, Medicine
Tanja Allen
Dr CARMEN Dinero
primary team
[facility]
Beata Wahl
Dr. Dinero
Medicine
Dr Dinero
Ana Florentino

## 2024-02-13 NOTE — CONSULT NOTE ADULT - REASON FOR ADMISSION
COVID19, Chest pain

## 2024-02-13 NOTE — PROGRESS NOTE ADULT - SUBJECTIVE AND OBJECTIVE BOX
NEPHROLOGY     Patient seen and examined with family at bedside, intubated and sedated, on levo gtt, in no acute distress.     MEDICATIONS  (STANDING):  acetaminophen   IVPB .. 1000 milliGRAM(s) IV Intermittent once  albuterol/ipratropium for Nebulization 3 milliLiter(s) Nebulizer every 6 hours  artificial  tears Solution 1 Drop(s) Left EYE four times a day  aspirin  chewable 81 milliGRAM(s) Enteral Tube daily  chlorhexidine 0.12% Liquid 15 milliLiter(s) Oral Mucosa every 12 hours  chlorhexidine 2% Cloths 1 Application(s) Topical daily  dextrose 5%. 1000 milliLiter(s) (100 mL/Hr) IV Continuous <Continuous>  dextrose 5%. 1000 milliLiter(s) (50 mL/Hr) IV Continuous <Continuous>  dextrose 50% Injectable 25 Gram(s) IV Push once  dextrose 50% Injectable 12.5 Gram(s) IV Push once  dextrose 50% Injectable 25 Gram(s) IV Push once  doxazosin 2 milliGRAM(s) Oral at bedtime  escitalopram 10 milliGRAM(s) Oral daily  furosemide   Injectable 20 milliGRAM(s) IV Push once  glucagon  Injectable 1 milliGRAM(s) IntraMuscular once  insulin glargine Injectable (LANTUS) 12 Unit(s) SubCutaneous <User Schedule>  insulin lispro (ADMELOG) corrective regimen sliding scale   SubCutaneous every 6 hours  levETIRAcetam  IVPB 500 milliGRAM(s) IV Intermittent every 12 hours  lidocaine 1%/epinephrine 1:100,000 Inj 20 milliLiter(s) Local Injection once  midodrine 10 milliGRAM(s) Oral every 8 hours  mupirocin 2% Ointment 1 Application(s) Topical two times a day  norepinephrine Infusion 0.05 MICROgram(s)/kG/Min (3.72 mL/Hr) IV Continuous <Continuous>  pantoprazole  Injectable 40 milliGRAM(s) IV Push every 12 hours  petrolatum Ophthalmic Ointment 1 Application(s) Left EYE at bedtime  piperacillin/tazobactam IVPB.. 3.375 Gram(s) IV Intermittent every 8 hours  propofol Infusion 10.004 MICROgram(s)/kG/Min (4.76 mL/Hr) IV Continuous <Continuous>  sodium chloride 3%  Inhalation 4 milliLiter(s) Inhalation two times a day  sucralfate suspension 1 Gram(s) Enteral Tube two times a day  tranexamic acid Injectable for Topical Use 10 milliLiter(s) Topical once    VITALS:  T(C): , Max: 37.4 (02-12-24 @ 20:00)  T(F): , Max: 99.4 (02-13-24 @ 04:00)  HR: 97 (02-13-24 @ 11:21)  BP: 122/50 (02-13-24 @ 11:00)  BP(mean): 66 (02-13-24 @ 11:00)  RR: 13 (02-13-24 @ 11:00)  SpO2: 100% (02-13-24 @ 11:21)    I and O's:    02-12 @ 07:01  -  02-13 @ 07:00  --------------------------------------------------------  IN: 2190.8 mL / OUT: 2060 mL / NET: 130.8 mL    PHYSICAL EXAM:  Constitutional: NAD  Neck: No JVD  Respiratory: intubated  Cardiovascular: S1, S2, RRR  Gastrointestinal: BS+, soft, NT/ND  Extremities:+ peripheral edema  : +delong.   Skin: No rashes    LABS:                        7.1    7.35  )-----------( 358      ( 13 Feb 2024 01:43 )             23.0     02-13    133<L>  |  96<L>  |  27<H>  ----------------------------<  211<H>  4.5   |  28  |  1.48<H>    Ca    7.9<L>      13 Feb 2024 01:43  Phos  3.5     02-13  Mg     2.10     02-13    TPro  6.3  /  Alb  2.3<L>  /  TBili  0.3  /  DBili  x   /  AST  25  /  ALT  20  /  AlkPhos  101  02-13

## 2024-02-13 NOTE — PROGRESS NOTE ADULT - SUBJECTIVE AND OBJECTIVE BOX
Aashish Boyer MD  Interventional Cardiology / Endovascular Specialist  [place] Office : [phone] [place] [id number] Tel:   [place] Office : [address] [facility] [address] [address]  Tel: [phone]      Subjective/Overnight events: Patient lying in bed remains intubated in ICU  	  MEDICATIONS:  aspirin  chewable 81 milliGRAM(s) Enteral Tube daily  doxazosin 2 milliGRAM(s) Oral at bedtime  midodrine 10 milliGRAM(s) Oral every 8 hours  norepinephrine Infusion 0.05 MICROgram(s)/kG/Min IV Continuous <Continuous>  tranexamic acid Injectable for Topical Use 10 milliLiter(s) Topical once    piperacillin/tazobactam IVPB.. 3.375 Gram(s) IV Intermittent every 8 hours    albuterol/ipratropium for Nebulization 3 milliLiter(s) Nebulizer every 6 hours  sodium chloride 3%  Inhalation 4 milliLiter(s) Inhalation two times a day    acetaminophen   IVPB .. 1000 milliGRAM(s) IV Intermittent once  escitalopram 10 milliGRAM(s) Oral daily  fentaNYL   Infusion. 0.5 MICROgram(s)/kG/Hr IV Continuous <Continuous>  levETIRAcetam  IVPB 500 milliGRAM(s) IV Intermittent every 12 hours  propofol Infusion 10.004 MICROgram(s)/kG/Min IV Continuous <Continuous>    pantoprazole  Injectable 40 milliGRAM(s) IV Push every 12 hours  sucralfate suspension 1 Gram(s) Enteral Tube two times a day    dextrose 50% Injectable 25 Gram(s) IV Push once  dextrose 50% Injectable 25 Gram(s) IV Push once  dextrose 50% Injectable 12.5 Gram(s) IV Push once  dextrose Oral Gel 15 Gram(s) Oral once PRN  glucagon  Injectable 1 milliGRAM(s) IntraMuscular once  insulin glargine Injectable (LANTUS) 12 Unit(s) SubCutaneous <User Schedule>  insulin lispro (ADMELOG) corrective regimen sliding scale   SubCutaneous every 6 hours    artificial  tears Solution 1 Drop(s) Left EYE four times a day  chlorhexidine 0.12% Liquid 15 milliLiter(s) Oral Mucosa every 12 hours  chlorhexidine 2% Cloths 1 Application(s) Topical daily  dextrose 5%. 1000 milliLiter(s) IV Continuous <Continuous>  dextrose 5%. 1000 milliLiter(s) IV Continuous <Continuous>  mupirocin 2% Ointment 1 Application(s) Topical two times a day  petrolatum Ophthalmic Ointment 1 Application(s) Left EYE at bedtime      PAST MEDICAL/SURGICAL HISTORY  PAST MEDICAL & SURGICAL HISTORY:  Hyperlipemia      Hypertension      Coronary Artery Disease      Diabetes Mellitus Type II      Stented Coronary Artery  total 5 stents, last stent 5/2019      Neuropathy      Myocardial infarction      Stroke  mild left facial numbness   no other residuals verbalized      Myoclonic jerking      Stage 3 chronic kidney disease      History of Cataract Extraction      Hx of CABG      H/O coronary angiogram      S/P coronary artery stent placement  1/6/09      S/P placement of cardiac pacemaker          SOCIAL HISTORY: Substance Use (street drugs): ( x ) never used  (  ) other:    FAMILY HISTORY:  No pertinent family history in first degree relatives        PHYSICAL EXAM:  T(C): 37.3 (02-13-24 @ 12:00), Max: 37.4 (02-12-24 @ 20:00)  HR: 107 (02-13-24 @ 14:00) (95 - 107)  BP: 118/58 (02-13-24 @ 14:00) (78/51 - 140/54)  RR: 14 (02-13-24 @ 14:00) (12 - 27)  SpO2: 100% (02-13-24 @ 14:00) (99% - 100%)  Wt(kg): --  I&O's Summary    12 Feb 2024 07:01  -  13 Feb 2024 07:00  --------------------------------------------------------  IN: 2190.8 mL / OUT: 2060 mL / NET: 130.8 mL    13 Feb 2024 07:01  -  13 Feb 2024 14:51  --------------------------------------------------------  IN: 299.7 mL / OUT: 800 mL / NET: -500.3 mL          GENERAL: intubated  EYES:  conjunctiva and sclera clear  ENMT: No tonsillar erythema, exudates, or enlargement  Cardiovascular: Normal S1 S2, No JVD, No murmurs, No edema  Respiratory: Lungs clear to auscultation	  Gastrointestinal:  Soft  Extremities: No edema                                     7.1    7.35  )-----------( 358      ( 13 Feb 2024 01:43 )             23.0     02-13    133<L>  |  96<L>  |  27<H>  ----------------------------<  211<H>  4.5   |  28  |  1.48<H>    Ca    7.9<L>      13 Feb 2024 01:43  Phos  3.5     02-13  Mg     2.10     02-13    TPro  6.3  /  Alb  2.3<L>  /  TBili  0.3  /  DBili  x   /  AST  25  /  ALT  20  /  AlkPhos  101  02-13    proBNP:   Lipid Profile:   HgA1c:   TSH:     Consultant(s) Notes Reviewed:  [x ] YES  [ ] NO    Care Discussed with Consultants/Other Providers [ x] YES  [ ] NO    Imaging Personally Reviewed independently:  [x] YES  [ ] NO    All labs, radiologic studies, vitals, orders and medications list reviewed. Patient is seen and examined at bedside. Case discussed with medical team.

## 2024-02-13 NOTE — CONSULT NOTE ADULT - ASSESSMENT
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 Course c/b acute cholecystitis s/p perc asa 1/26 by IR, also with recent SMA stent.   Patient failed trial of extubation now pending trach and peg    #prolonged ventilation  Patient underwent uneventful percutaneous tracheostomy with size 7 portex. See procedure notes for further details.       Post-tracheostomy care instructions:  -Minimize tension on tracheostomy: Keep tracheostomy elevated on towels to maintain perpendicular to neck and keep enough slack on vent tubing to avoid tension  -Sedate/Restrain patient to ensure does not pull at tracheostomy  -Keep tracheostomy retention collar in place at all times  -Utilize tracheostomy specific in-line suction or red rubber suction tubing through the swivel valve  -First dressing change at 72 hours. If dressings are grossly saturated before that time, reenforce dressings and page pulmonary/critical care fellow  2 sutures are to remain in place for 10 days, please do not remove before then. Sutures may be removed by the primary service. If feel there are issues with the sutures, please contact MICU/Pulmonary fellow  -First tracheostomy change to happen in 3 weeks. If patient still admitted at that time please page pulmonary service to arrange exchange

## 2024-02-13 NOTE — CONSULT NOTE ADULT - CONSULT REQUESTED DATE/TIME
01-Jan-2024 17:03
09-Jan-2024 13:52
24-Dec-2023
24-Dec-2023 15:42
24-Jan-2024 13:25
16-Jan-2024 09:05
19-Jan-2024 10:04
24-Dec-2023 23:02
24-Dec-2023 16:19
13-Feb-2024 18:14
26-Jan-2024 12:45
18-Jan-2024
31-Jan-2024 19:19
25-Dec-2023 02:02
06-Jan-2024 12:31

## 2024-02-13 NOTE — PROGRESS NOTE ADULT - ATTENDING COMMENTS
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 Course c/b acute cholecystitis s/p perc asa 1/26 by IR, also with recent SMA stent.   Patient failed trial of extubation 2/2 ?aspiration, ?mucus plugging iso AMS 2/2 toxic metabolic encephalopathy Now pending trach and peg    Febrile overnight >102  N: Myoclonus, ?Seizure do  Toxic metabolic encephalopathy  MRI negative for acute pathology  Petechial Hemorrhages   - Daily sedation vacation  - Prop 40   - Lexapro and Keppra  - Optho for hemorrhages  CV: Sedation related hypotension  Shock: Cardiogenic v. distributive v. vasoplegic   AF with RVR  CAD s/p CABG and PCI (2022)   - Midodrine 10 q 8 hours  - Levo with MAP goal >65  - ASA for CAD, holding Brillinta (plan to resume post procedure)  P Acute hypoxemic/hypercapnic respiratory failure requiring intubation mechanical ventilation  Multifocal Pneumonia  MSSA pneumonia  Pleural effusions  Aspiration   COVID  - Lung protective ventilation  - Daily PSV, tolerating 5/5, 30%  - Scant secretions, minimal suctioning requirement  - Duonebs q 6 hours, and airway clearance: inhaled hypertonic saline q 12  - Trach plan for this PM  - Goal net negative to even  GI: Acute cholecystitis s/p biliary tube  SMA stent   - Planned for PEG in the coming days  - TF on hold  Renal:   - Trend creatinine  - Replete lytes as needed  - Diuresis as tolerated  ID: Acute asa s/p perc asa  Recurrent fevers  - Infectious work up negative thus far  - Sputum culture  - Zosyn   Heme: Anemia of chronic disease  Endo:   - Avoid hypoglycemia, adjust Insulin while NPO    DVT: resume based on procedural plan  GI: PPI 40 BID iso GIB    Full code  Prognosis guarded. Updated family at bedside

## 2024-02-13 NOTE — PROGRESS NOTE ADULT - ASSESSMENT
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     PLAN  =====Neurologic=====  #CVA  - prior CVA x3 with no residual deficits    #myoclonic jerks  - on Keppra 500 mg BID, per neuro wishing to wean however per family request will continue at 500 mg BID, but now off valproate per neuro    - also per neuro, use precedex as needed for clinical sedation   -Started prop on 2/10, in anticipation of procedure  - holding home  gabapentin and memantine in setting of somnolence  - continue lexapro     #AMS  - CT/CTA negative for stroke. EEG now off   - MRI brain - unremarkable findings. Family now amenable to Trach/PEG. Will arrange Trach next week      =====Pulmonary=====  #Prior aspiration PNA  - s/p zosyn for aspiration PNA from 1/20- 1/28     #COVID  - s/p paxlovid and 1 dose remdesivir while inpatient    #Pleural effusions b/l  - CT chest from 1/16 showing new small bilateral pleural effusions/ atelectasis/ patchy bilateral ground-glass opacities are consistent with pulmonary edema.  - Diuresis PRN based on amount of pleural effusions on POCUS    #AHRF  - Intubated for airway protection in s/o AMS. Sedated  - Holding brilinta for possible procedure. Will need to restart ASAP after trach/PEG  --trach tentatively this week. likely not 2/12 but may be in the coming days - TBD, NPO @ MN w/ Pre-op labs.     =====Cardiovascular=====  Normotensive, not requiring pressors or midodrine - stopped midodrine on 2/11    Poor cardiac function noted on POCUS, f/u trops sent 2/12    #HTN  - on home amlodipine and metoprolol currently holding     #CAD  - on Brilinta (holding) aspirin and ranolazine continue   - as per vascular okay to hold Brilinta for possible pleural effusion thoracentesis  -have not heard back from cardiology regarding Brilinta / last stent 2022 , will hold   -Need to restart brilinta ASAP after Trach/PEG    #Afib  - pt with known history of pAF, first observed 2/1  - can consider starting AC and/or rate control if persists     =====GI=====  #upper GI bleed  - s/p EGD showing dieulafoy lesion and esophagitis likely 2/2 NGT irritation s/p 2 clips  - on protonix IV BID continue   - CT on 1/25 with cholelithiasis and sludge HIDA on 1/26 confirming likely acute choley, s/p IR perc cholecystostomy 1/26   - Discussed w/ family, want trach/PEG. Discussed w/ GI - Only will do PEG 24-48 hours after trach placed. Recommend reaching out when trach date is set  - Trach on 2/12, will need to e-mail GI after trach placed. Informed GI of date on 2/9    #Diet  - tube feeds NGT   - Per GI, re-email GI for consult for PEG after trach placed, plan for PEG 24-48 hrs after trach.     =====Renal/=====  #Electrolytes  - Maintain K>4, Phos>3, Mag>2, iCal>1  - baseline cr 1.5, at baseline  - on free water flushes for hyperNa. Trend BMP/Na - adjsted to 200 q12h   - Diuresis PRN - Will give 40 mg IVP Lasix 2/11.     Trial of Void as of 2/9    =====Endocrine=====  #DM2  - on lantus 12U and q6 SSI  - a1c 9.3, glucose wnl inpatient     =====Infectious Disease=====  #COVID   - s/p paxlovid and 1 dose remdesivir  # PNA  - CT chest showing ground glass opacities  - s/p zosyn  - intermittent episodes of fevers, likely source GI given choleycystitis? culture NGTD  - meropenem 5d course thru 2/1   - now monitoring off antibiotics  - newly hypothermic on 2/11, UA and BCx sent, Sputum Cx    =====Heme/Onc=====  #DVT Ppx  - heparin sub-q held in s/o procedure, restart when able    =====Ethics=====  FULL CODE.  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     PLAN  =====Neurologic=====  #CVA  - prior CVA x3 with no residual deficits    #myoclonic jerks  - on Keppra 500 mg BID, per neuro wishing to wean however per family request will continue at 500 mg BID, but now off valproate per neuro    - also per neuro, use precedex as needed for clinical sedation   -Started prop on 2/10, in anticipation of procedure  - holding home  gabapentin and memantine in setting of somnolence  - continue lexapro     #AMS  - CT/CTA negative for stroke. EEG now off   - MRI brain - unremarkable findings. Family now amenable to Trach/PEG.       =====Pulmonary=====    #COVID  - s/p paxlovid and 1 dose remdesivir while inpatient    #Pleural effusions b/l  - CT chest from 1/16 showing new small bilateral pleural effusions/ atelectasis/ patchy bilateral ground-glass opacities are consistent with pulmonary edema.  - Diuresis PRN based on amount of pleural effusions on POCUS    #AHRF  - Intubated for airway protection in s/o AMS. Sedated  - Holding brilinta for possible procedure. Will need to restart ASAP after trach/PEG  - s/p zosyn for aspiration PNA from 1/20- 1/28   - MRSA Swab positive for Staph Aureus only, started on Mupirocin 2/13-  - Restarted on Zosyn. BAL culture pending given fevers.   - Trach 2/13/2024.     =====Cardiovascular=====  Midodrine 10 q8h    #HTN  - on home amlodipine and metoprolol currently holding     #CAD  - on Brilinta (holding) aspirin and ranolazine continue   - as per vascular okay to hold Brilinta for possible pleural effusion thoracentesis  -have not heard back from cardiology regarding Brilinta / last stent 2022 , will hold   -Need to restart brilinta ASAP after Trach/PEG    #Afib  - pt with known history of pAF, first observed 2/1  - can consider starting AC and/or rate control if persists     =====GI=====  #upper GI bleed  - s/p EGD showing dieulafoy lesion and esophagitis likely 2/2 NGT irritation s/p 2 clips  - on protonix IV BID continue   - CT on 1/25 with cholelithiasis and sludge HIDA on 1/26 confirming likely acute choley, s/p IR perc cholecystostomy 1/26   - Discussed w/ family, want trach/PEG. Discussed w/ GI - Only will do PEG 24-48 hours after trach placed. Recommend reaching out when trach date is set  - Trach 2/13, will e-mail GI per their instruction for consultation for PEG afterwards.     #Diet  - tube feeds NGT   - Per GI, re-email GI for consult for PEG after trach placed, plan for PEG 24-48 hrs after trach.     =====Renal/=====  #Electrolytes  - Maintain K>4, Phos>3, Mag>2, iCal>1  - baseline cr 1.5, at baseline  - on free water flushes for hyperNa. Trend BMP/Na - adjsted to 200 q12h   - Diuresis PRN - Will give 40 mg IVP Lasix 2/11.   - TOV when trach/PEG completed    =====Endocrine=====  #DM2  - on lantus 12U and q6 SSI  - a1c 9.3, glucose wnl inpatient     =====Infectious Disease=====  #COVID   - s/p paxlovid and 1 dose remdesivir  # PNA  - CT chest showing ground glass opacities  - s/p zosyn  - intermittent episodes of fevers, likely source GI given choleycystitis? culture NGTD  - meropenem 5d course thru 2/1   - now monitoring off antibiotics  - newly hypothermic on 2/11, UA and BCx sent, Sputum Cx    =====Heme/Onc=====  #DVT Ppx  - heparin sub-q held in s/o procedure, restart when able    =====Ethics=====  FULL CODE.  After family discussion, Trach/PEG

## 2024-02-13 NOTE — CONSULT NOTE ADULT - SUBJECTIVE AND OBJECTIVE BOX
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 Patient states that he has been having a dry cough for the past week, worsened over the past few days. Denies SOB with the cough, denies hemoptysis. Reports fevers at home to 101.5 with associated diaphoresis. Said that he has significant pinprick like b/l chest pain with his cough but denies any chest pain when not coughing or with ambulation. Also reports rhinorrhea and sore throat. Reports generalized weakness and poor appetite. States his daughter was visiting him over the week end last week and she was positive for COVID19. Patient tested positive for COVID19 2 days prior and was started on paxlovid as outpatient. Of note, family states that the patient has had worsening confusion at home over the past 6 months (becoming disoriented to time) but recently has been more confused, talking about seeing people that are not present.     On arrival to the ED his vitals were T 98 -> 99.2, P 80, /72, RR 18, ) sat 100% RA. His lab work showed leukocytosis with neutrophilia and bandemia, MABLE, mild hyponatremia, indeterminant but stable troponins, and elevated lactate to 2.3. His COVID19 swab was positive. His CXR showed bibasilar crackles.  (23 Dec 2023 22:51)    IP consulted for prolonged ventilation and tracheostomy placement.    PAST MEDICAL & SURGICAL HISTORY:  Hyperlipemia      Hypertension      Coronary Artery Disease      Diabetes Mellitus Type II      Stented Coronary Artery  total 5 stents, last stent 5/2019      Neuropathy      Myocardial infarction      Stroke  mild left facial numbness   no other residuals verbalized      Myoclonic jerking      Stage 3 chronic kidney disease      History of Cataract Extraction      Hx of CABG      H/O coronary angiogram      S/P coronary artery stent placement  1/6/09      S/P placement of cardiac pacemaker          FAMILY HISTORY:  No pertinent family history in first degree relatives          Allergies    fluoroquinolone antibiotics (Other)  Cipro (Unknown)  Tegretol (Unknown)  carbamazepine (Other)    Intolerances        HOME MEDICATIONS:  Home Medications:  Basaglar KwikPen 100 units/mL subcutaneous solution: 23 unit(s) subcutaneous once a day 23-26 units in AM (24 Dec 2023 02:32)  Crestor 40 mg oral tablet: 1 tab(s) orally once a day (24 Dec 2023 02:34)  Keppra 250 mg oral tablet: 1 tab(s) orally 2 times a day (24 Dec 2023 02:36)  Lexapro 10 mg oral tablet: 1 tab(s) orally once a day (24 Dec 2023 02:34)  memantine 5 mg oral tablet: 1 tab(s) orally 2 times a day (24 Dec 2023 02:36)  metoprolol succinate 25 mg oral tablet, extended release: 1 tab(s) orally 2 times a day Hold for SBP &lt; 100 (24 Dec 2023 02:36)  NovoLOG FlexTouch 100 units/mL subcutaneous solution: 3 unit(s) subcutaneous 3 times a day for FS &gt; 160 with meals (24 Dec 2023 02:37)  ranolazine 500 mg oral granule, extended release: 500 milligram(s) orally 2 times a day (24 Dec 2023 02:36)  ticagrelor 60 mg oral tablet: 1 tab(s) orally 2 times a day (24 Dec 2023 02:37)  Tradjenta 5 mg oral tablet: 1 tab(s) orally once a day (at bedtime) (24 Dec 2023 02:33)      REVIEW OF SYSTEMS:  [x ] Unable to assess ROS because sedated    OBJECTIVE:  ICU Vital Signs Last 24 Hrs  T(C): 37.3 (13 Feb 2024 12:00), Max: 37.4 (12 Feb 2024 20:00)  T(F): 99.2 (13 Feb 2024 12:00), Max: 99.4 (13 Feb 2024 04:00)  HR: 105 (13 Feb 2024 17:00) (92 - 107)  BP: 108/44 (13 Feb 2024 17:00) (78/51 - 140/54)  BP(mean): 59 (13 Feb 2024 17:00) (48 - 82)  ABP: --  ABP(mean): --  RR: 23 (13 Feb 2024 17:00) (0 - 27)  SpO2: 100% (13 Feb 2024 17:00) (99% - 100%)    O2 Parameters below as of 13 Feb 2024 15:46  Patient On (Oxygen Delivery Method): ventilator          Mode: AC/ CMV (Assist Control/ Continuous Mandatory Ventilation), RR (machine): 12, TV (machine): 450, FiO2: 30, PEEP: 5, ITime: 0.72, MAP: 9, PIP: 29    02-12 @ 07:01  -  02-13 @ 07:00  --------------------------------------------------------  IN: 2190.8 mL / OUT: 2060 mL / NET: 130.8 mL    02-13 @ 07:01 - 02-13 @ 18:14  --------------------------------------------------------  IN: 346.8 mL / OUT: 800 mL / NET: -453.2 mL      CAPILLARY BLOOD GLUCOSE      POCT Blood Glucose.: 217 mg/dL (13 Feb 2024 18:04)      PHYSICAL EXAM:  GENERAL: NAD, intubated  HEAD:  Atraumatic, Normocephalic  EYES: EOMI, conjunctiva and sclera clear  CHEST/LUNG: mechanical bs  HEART: Regular rate and rhythm; No murmurs, rubs, or gallops  ABDOMEN: Soft, Nontender, Nondistended;   SKIN: No rashes or lesions  NERVOUS SYSTEM:  Alert & Oriented X0, intubated and sedated    HOSPITAL MEDICATIONS:  Standing Meds:  acetaminophen   IVPB .. 1000 milliGRAM(s) IV Intermittent once  albuterol/ipratropium for Nebulization 3 milliLiter(s) Nebulizer every 6 hours  artificial  tears Solution 1 Drop(s) Left EYE four times a day  aspirin  chewable 81 milliGRAM(s) Enteral Tube daily  chlorhexidine 0.12% Liquid 15 milliLiter(s) Oral Mucosa every 12 hours  chlorhexidine 2% Cloths 1 Application(s) Topical daily  dextrose 5%. 1000 milliLiter(s) IV Continuous <Continuous>  dextrose 5%. 1000 milliLiter(s) IV Continuous <Continuous>  dextrose 50% Injectable 25 Gram(s) IV Push once  dextrose 50% Injectable 25 Gram(s) IV Push once  dextrose 50% Injectable 12.5 Gram(s) IV Push once  doxazosin 2 milliGRAM(s) Oral at bedtime  escitalopram 10 milliGRAM(s) Oral daily  fentaNYL   Infusion. 0.5 MICROgram(s)/kG/Hr IV Continuous <Continuous>  glucagon  Injectable 1 milliGRAM(s) IntraMuscular once  insulin glargine Injectable (LANTUS) 12 Unit(s) SubCutaneous <User Schedule>  insulin lispro (ADMELOG) corrective regimen sliding scale   SubCutaneous every 6 hours  levETIRAcetam  IVPB 500 milliGRAM(s) IV Intermittent every 12 hours  midodrine 10 milliGRAM(s) Oral every 8 hours  mupirocin 2% Ointment 1 Application(s) Topical two times a day  norepinephrine Infusion 0.05 MICROgram(s)/kG/Min IV Continuous <Continuous>  pantoprazole  Injectable 40 milliGRAM(s) IV Push every 12 hours  petrolatum Ophthalmic Ointment 1 Application(s) Left EYE at bedtime  piperacillin/tazobactam IVPB.. 3.375 Gram(s) IV Intermittent every 8 hours  propofol Infusion 10.004 MICROgram(s)/kG/Min IV Continuous <Continuous>  sodium chloride 3%  Inhalation 4 milliLiter(s) Inhalation two times a day  sucralfate suspension 1 Gram(s) Enteral Tube two times a day  tranexamic acid Injectable for Topical Use 10 milliLiter(s) Topical once      PRN Meds:  dextrose Oral Gel 15 Gram(s) Oral once PRN      LABS:    The Labs were reviewed by me   The Radiology was reviewed by me    EKG tracing reviewed by me    02-13    133<L>  |  96<L>  |  27<H>  ----------------------------<  211<H>  4.5   |  28  |  1.48<H>  02-12    132<L>  |  95<L>  |  31<H>  ----------------------------<  219<H>  4.6   |  25  |  1.41<H>  02-11    136  |  97<L>  |  30<H>  ----------------------------<  172<H>  4.8   |  26  |  1.43<H>    Ca    7.9<L>      13 Feb 2024 01:43  Ca    8.0<L>      12 Feb 2024 02:40  Ca    8.1<L>      11 Feb 2024 23:11  Phos  3.5     02-13  Mg     2.10     02-13    TPro  6.3  /  Alb  2.3<L>  /  TBili  0.3  /  DBili  x   /  AST  25  /  ALT  20  /  AlkPhos  101  02-13  TPro  6.2  /  Alb  2.2<L>  /  TBili  0.3  /  DBili  x   /  AST  31  /  ALT  23  /  AlkPhos  96  02-12  TPro  6.5  /  Alb  2.4<L>  /  TBili  0.2  /  DBili  x   /  AST  28  /  ALT  23  /  AlkPhos  97  02-11    Magnesium: 2.10 mg/dL (02-13-24 @ 01:43)  Magnesium: 2.10 mg/dL (02-12-24 @ 02:40)  Magnesium: 2.00 mg/dL (02-11-24 @ 23:11)  Magnesium: 2.10 mg/dL (02-11-24 @ 00:24)    Phosphorus: 3.5 mg/dL (02-13-24 @ 01:43)  Phosphorus: 2.9 mg/dL (02-12-24 @ 02:40)  Phosphorus: 2.9 mg/dL (02-11-24 @ 23:11)  Phosphorus: 3.1 mg/dL (02-11-24 @ 00:24)      PT/INR - ( 13 Feb 2024 01:43 )   PT: 12.0 sec;   INR: 1.07 ratio         PTT - ( 13 Feb 2024 01:43 )  PTT:32.3 sec              Urinalysis Basic - ( 13 Feb 2024 01:43 )    Color: x / Appearance: x / SG: x / pH: x  Gluc: 211 mg/dL / Ketone: x  / Bili: x / Urobili: x   Blood: x / Protein: x / Nitrite: x   Leuk Esterase: x / RBC: x / WBC x   Sq Epi: x / Non Sq Epi: x / Bacteria: x                              7.1    7.35  )-----------( 358      ( 13 Feb 2024 01:43 )             23.0                         7.5    9.59  )-----------( 377      ( 12 Feb 2024 02:40 )             24.3                         7.8    8.35  )-----------( 369      ( 11 Feb 2024 23:11 )             24.1     CAPILLARY BLOOD GLUCOSE      POCT Blood Glucose.: 217 mg/dL (13 Feb 2024 18:04)  POCT Blood Glucose.: 217 mg/dL (13 Feb 2024 12:45)  POCT Blood Glucose.: 195 mg/dL (13 Feb 2024 06:11)  POCT Blood Glucose.: 219 mg/dL (12 Feb 2024 23:44)    Blood Gas Source Venous: Venous (02-12-24 @ 02:40)      MICROBIOLOGY:     Culture - Blood (collected 11 Feb 2024 12:00)  Source: .Blood Blood-Peripheral  Preliminary Report (13 Feb 2024 17:01):    No growth at 48 Hours    Culture - Blood (collected 11 Feb 2024 12:00)  Source: .Blood Blood-Peripheral  Preliminary Report (13 Feb 2024 17:01):    No growth at 48 Hours        RADIOLOGY:  [ ] Reviewed and interpreted by me    PULMONARY FUNCTION TESTS:    EKG:

## 2024-02-13 NOTE — CHART NOTE - NSCHARTNOTEFT_GEN_A_CORE
: LeJeune/Amelie    INDICATION: Percutaneous Tracheostomy    PROCEDURE:  [ ] LIMITED ECHO  [ ] LIMITED CHEST  [ ] LIMITED RETROPERITONEAL  [ ] LIMITED ABDOMINAL  [ ] LIMITED DVT  [ ] NEEDLE GUIDANCE VASCULAR  [ ] NEEDLE GUIDANCE THORACENTESIS  [ ] NEEDLE GUIDANCE PARACENTESIS  [ ] NEEDLE GUIDANCE PERICARDIOCENTESIS  [X] LIMITED NECK    FINDINGS: The thyroid cartilage, cricoid cartilage and tracheal rings were located. The thyroid and isthmus was located. No high riding vessel noted. No vessels noted in the field of the percutaneous tracheostomy.     INTERPRETATION: No contraindications to proceeding with percutaneous tracheostomy.     Images stored on [employer]. : LeJeune/Murn    INDICATION: Percutaneous Tracheostomy    PROCEDURE:  [ ] LIMITED ECHO  [ ] LIMITED CHEST  [ ] LIMITED RETROPERITONEAL  [ ] LIMITED ABDOMINAL  [ ] LIMITED DVT  [ ] NEEDLE GUIDANCE VASCULAR  [ ] NEEDLE GUIDANCE THORACENTESIS  [ ] NEEDLE GUIDANCE PARACENTESIS  [ ] NEEDLE GUIDANCE PERICARDIOCENTESIS  [X] LIMITED NECK    FINDINGS: The thyroid cartilage, cricoid cartilage and tracheal rings were located. The thyroid and isthmus was located. No high riding vessel noted. No vessels noted in the field of the percutaneous tracheostomy.     INTERPRETATION: No contraindications to proceeding with percutaneous tracheostomy.     Images stored on [email or web address].      Attending Addendum:     I was present during the entire procedure and agree with the above findings and interpretation. TIme spent on the procedure was separate from the critical care time spent caring for the patient.       Rangel Kinsey MD  Interventional Pulmonology & Critical Care Medicine  [facility]

## 2024-02-13 NOTE — CONSULT NOTE ADULT - PROVIDER SPECIALTY LIST ADULT
Neurology
ENT
Gastroenterology
Intervent Radiology
Nephrology
Pulmonology
Infectious Disease
Intervent Pulmonology
Intervent Pulmonology
Rehab Medicine
Cardiology
Surgery
SICU
Vascular Surgery
Internal Medicine

## 2024-02-13 NOTE — PROGRESS NOTE ADULT - ASSESSMENT
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     EKG SR RBBB (old per family)     1) Hypoxia   - s/p bronch   - Rt pleural effusion   - held lasix given Hypernatremia and worsening creat  - intubated again , AMS    - f/u neuro recs MRI brain shows no acute disease  - for trach and peg     2) CAD s/p CABG  - p/w chest pain. EKG non ischemic , trop -sera   - chest pains  Trop mildly elevated and trending down likely sec to kidney disease,   - holding brilinta, was on it for SMA stent placed in 12/2023, held 2/2 anticipation for PEG placement, restart when no further invasive procedures planned  -TTE 2/12 with  mod decrease LV systolic function, new. euvolemic on exam, if oxygen requirements on vent increase consider diuretics. given current condition will medically manage    3) Covid +  - s/p remdesivir   - c/w supportive management   - t/t per primary team     4) Abd distension   -CTA AP obtained which showed  partially occlusive calcified and non-calcified plaque just distal to take-off of the SMA, with estimated at least 75% luminal narrowing. Limited evaluation of its distal branches. 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   -on Protonix IV q 12      6) Afib  -hx of PAF  -no AC 2/2 GIB  -resume metoprolol when weaned off pressors and able to tolerate

## 2024-02-13 NOTE — CONSULT NOTE ADULT - ATTENDING COMMENTS
89M with PMH of CABG DM2, CKD presenting to MICU after bronchoscopy to emergently remove mucous plugs obstructing mainstem bronchus (1/19) intubated on 1/22. Course since complicated by acute asa (s/p perc) and recent SMA stent. Failed trail of extubation now team requesting tracheostomy.     Anatomy, CT, labs reviewed. Agree with fellow plan above. PEG to be completed by GI given history of abdominal surgery and recent SMA stent.     Rangel Kinsey MD  Interventional Pulmonology & Critical Care Medicine

## 2024-02-13 NOTE — PROGRESS NOTE ADULT - SUBJECTIVE AND OBJECTIVE BOX
***************************************************************  Candelario (Renee) Shantel PGY2  MICU  ***************************************************************    SHAIK CHARANJIT  89y  MRN: [id number]    Patient is a 89y old  Male who presents with a chief complaint of COVID19, Chest pain (12 Feb 2024 15:19)      Subjective: no events ON. Denies fever, CP, SOB, abn pain, N/V, dysuria. Tolerating diet.      MEDICATIONS  (STANDING):  acetaminophen   IVPB .. 1000 milliGRAM(s) IV Intermittent once  albuterol/ipratropium for Nebulization 3 milliLiter(s) Nebulizer every 6 hours  artificial  tears Solution 1 Drop(s) Left EYE four times a day  aspirin  chewable 81 milliGRAM(s) Enteral Tube daily  chlorhexidine 0.12% Liquid 15 milliLiter(s) Oral Mucosa every 12 hours  chlorhexidine 2% Cloths 1 Application(s) Topical daily  dextrose 5%. 1000 milliLiter(s) (50 mL/Hr) IV Continuous <Continuous>  dextrose 5%. 1000 milliLiter(s) (100 mL/Hr) IV Continuous <Continuous>  dextrose 50% Injectable 25 Gram(s) IV Push once  dextrose 50% Injectable 12.5 Gram(s) IV Push once  dextrose 50% Injectable 25 Gram(s) IV Push once  doxazosin 2 milliGRAM(s) Oral at bedtime  escitalopram 10 milliGRAM(s) Oral daily  glucagon  Injectable 1 milliGRAM(s) IntraMuscular once  insulin glargine Injectable (LANTUS) 12 Unit(s) SubCutaneous <User Schedule>  insulin lispro (ADMELOG) corrective regimen sliding scale   SubCutaneous every 6 hours  levETIRAcetam  IVPB 500 milliGRAM(s) IV Intermittent every 12 hours  norepinephrine Infusion 0.05 MICROgram(s)/kG/Min (3.72 mL/Hr) IV Continuous <Continuous>  pantoprazole  Injectable 40 milliGRAM(s) IV Push every 12 hours  petrolatum Ophthalmic Ointment 1 Application(s) Left EYE at bedtime  piperacillin/tazobactam IVPB.. 3.375 Gram(s) IV Intermittent every 8 hours  propofol Infusion 10 MICROgram(s)/kG/Min (4.76 mL/Hr) IV Continuous <Continuous>  sodium chloride 3%  Inhalation 4 milliLiter(s) Inhalation two times a day  sucralfate suspension 1 Gram(s) Enteral Tube two times a day    MEDICATIONS  (PRN):  dextrose Oral Gel 15 Gram(s) Oral once PRN Blood Glucose LESS THAN 70 milliGRAM(s)/deciliter      Objective:    Vitals: Vital Signs Last 24 Hrs  T(C): 37.4 (02-13-24 @ 04:00), Max: 37.4 (02-12-24 @ 20:00)  T(F): 99.4 (02-13-24 @ 04:00), Max: 99.4 (02-13-24 @ 04:00)  HR: 104 (02-13-24 @ 06:00) (97 - 108)  BP: 135/52 (02-13-24 @ 06:00) (78/51 - 140/54)  BP(mean): 73 (02-13-24 @ 06:00) (56 - 77)  RR: 16 (02-13-24 @ 06:00) (12 - 28)  SpO2: 100% (02-13-24 @ 06:00) (94% - 100%)            I&O's Summary    12 Feb 2024 07:01  -  13 Feb 2024 07:00  --------------------------------------------------------  IN: 2165.9 mL / OUT: 1985 mL / NET: 180.9 mL        PHYSICAL EXAM:  GENERAL: NAD  HEAD:  Atraumatic, Normocephalic  EYES: EOMI, conjunctiva and sclera clear  CHEST/LUNG: Clear to auscultation bilaterally; No rales, rhonchi, wheezing, or rubs  HEART: Regular rate and rhythm; No murmurs, rubs, or gallops  ABDOMEN: Soft, Nontender, Nondistended;   SKIN: No rashes or lesions  NERVOUS SYSTEM:  Alert & Oriented X3, no focal deficits    LABS:  02-13    133<L>  |  96<L>  |  27<H>  ----------------------------<  211<H>  4.5   |  28  |  1.48<H>  02-12    132<L>  |  95<L>  |  31<H>  ----------------------------<  219<H>  4.6   |  25  |  1.41<H>  02-11    136  |  97<L>  |  30<H>  ----------------------------<  172<H>  4.8   |  26  |  1.43<H>    Ca    7.9<L>      13 Feb 2024 01:43  Ca    8.0<L>      12 Feb 2024 02:40  Ca    8.1<L>      11 Feb 2024 23:11  Phos  3.5     02-13  Mg     2.10     02-13    TPro  6.3  /  Alb  2.3<L>  /  TBili  0.3  /  DBili  x   /  AST  25  /  ALT  20  /  AlkPhos  101  02-13  TPro  6.2  /  Alb  2.2<L>  /  TBili  0.3  /  DBili  x   /  AST  31  /  ALT  23  /  AlkPhos  96  02-12  TPro  6.5  /  Alb  2.4<L>  /  TBili  0.2  /  DBili  x   /  AST  28  /  ALT  23  /  AlkPhos  97  02-11      PT/INR - ( 13 Feb 2024 01:43 )   PT: 12.0 sec;   INR: 1.07 ratio         PTT - ( 13 Feb 2024 01:43 )  PTT:32.3 sec              Urinalysis Basic - ( 13 Feb 2024 01:43 )    Color: x / Appearance: x / SG: x / pH: x  Gluc: 211 mg/dL / Ketone: x  / Bili: x / Urobili: x   Blood: x / Protein: x / Nitrite: x   Leuk Esterase: x / RBC: x / WBC x   Sq Epi: x / Non Sq Epi: x / Bacteria: x                              7.1    7.35  )-----------( 358      ( 13 Feb 2024 01:43 )             23.0                         7.5    9.59  )-----------( 377      ( 12 Feb 2024 02:40 )             24.3                         7.8    8.35  )-----------( 369      ( 11 Feb 2024 23:11 )             24.1     CAPILLARY BLOOD GLUCOSE      POCT Blood Glucose.: 195 mg/dL (13 Feb 2024 06:11)  POCT Blood Glucose.: 219 mg/dL (12 Feb 2024 23:44)  POCT Blood Glucose.: 206 mg/dL (12 Feb 2024 17:36)  POCT Blood Glucose.: 205 mg/dL (12 Feb 2024 12:16)      RADIOLOGY & ADDITIONAL TESTS:    Imaging Personally Reviewed:  [x ] YES  [ ] NO    Consultants involved in case:   Consultant(s) Notes Reviewed:  [ x] YES  [ ] NO:   Care Discussed with Consultants/Other Providers [x ] YES  [ ] NO         ***************************************************************  Candelario (MunirNavjot) Shantel PGY2  MICU  ***************************************************************    SHAIK CHARANJIT  89y  MRN: [id number]    Patient is a 89y old  Male who presents with a chief complaint of COVID19, Chest pain (12 Feb 2024 15:19)      Subjective: NAEO. Remains intubated and sedated in anticipation of trach.     MEDICATIONS  (STANDING):  acetaminophen   IVPB .. 1000 milliGRAM(s) IV Intermittent once  albuterol/ipratropium for Nebulization 3 milliLiter(s) Nebulizer every 6 hours  artificial  tears Solution 1 Drop(s) Left EYE four times a day  aspirin  chewable 81 milliGRAM(s) Enteral Tube daily  chlorhexidine 0.12% Liquid 15 milliLiter(s) Oral Mucosa every 12 hours  chlorhexidine 2% Cloths 1 Application(s) Topical daily  dextrose 5%. 1000 milliLiter(s) (50 mL/Hr) IV Continuous <Continuous>  dextrose 5%. 1000 milliLiter(s) (100 mL/Hr) IV Continuous <Continuous>  dextrose 50% Injectable 25 Gram(s) IV Push once  dextrose 50% Injectable 12.5 Gram(s) IV Push once  dextrose 50% Injectable 25 Gram(s) IV Push once  doxazosin 2 milliGRAM(s) Oral at bedtime  escitalopram 10 milliGRAM(s) Oral daily  glucagon  Injectable 1 milliGRAM(s) IntraMuscular once  insulin glargine Injectable (LANTUS) 12 Unit(s) SubCutaneous <User Schedule>  insulin lispro (ADMELOG) corrective regimen sliding scale   SubCutaneous every 6 hours  levETIRAcetam  IVPB 500 milliGRAM(s) IV Intermittent every 12 hours  norepinephrine Infusion 0.05 MICROgram(s)/kG/Min (3.72 mL/Hr) IV Continuous <Continuous>  pantoprazole  Injectable 40 milliGRAM(s) IV Push every 12 hours  petrolatum Ophthalmic Ointment 1 Application(s) Left EYE at bedtime  piperacillin/tazobactam IVPB.. 3.375 Gram(s) IV Intermittent every 8 hours  propofol Infusion 10 MICROgram(s)/kG/Min (4.76 mL/Hr) IV Continuous <Continuous>  sodium chloride 3%  Inhalation 4 milliLiter(s) Inhalation two times a day  sucralfate suspension 1 Gram(s) Enteral Tube two times a day    MEDICATIONS  (PRN):  dextrose Oral Gel 15 Gram(s) Oral once PRN Blood Glucose LESS THAN 70 milliGRAM(s)/deciliter      Objective:    Vitals: Vital Signs Last 24 Hrs  T(C): 37.4 (02-13-24 @ 04:00), Max: 37.4 (02-12-24 @ 20:00)  T(F): 99.4 (02-13-24 @ 04:00), Max: 99.4 (02-13-24 @ 04:00)  HR: 104 (02-13-24 @ 06:00) (97 - 108)  BP: 135/52 (02-13-24 @ 06:00) (78/51 - 140/54)  BP(mean): 73 (02-13-24 @ 06:00) (56 - 77)  RR: 16 (02-13-24 @ 06:00) (12 - 28)  SpO2: 100% (02-13-24 @ 06:00) (94% - 100%)            I&O's Summary    12 Feb 2024 07:01  -  13 Feb 2024 07:00  --------------------------------------------------------  IN: 2165.9 mL / OUT: 1985 mL / NET: 180.9 mL        PHYSICAL EXAM:  GENERAL: NAD, intubated  HEAD:  Atraumatic, Normocephalic  EYES: EOMI, conjunctiva and sclera clear  CHEST/LUNG: Clear to auscultation bilaterally; No rales, rhonchi, wheezing, or rubs  HEART: Regular rate and rhythm; No murmurs, rubs, or gallops  ABDOMEN: Soft, Nontender, Nondistended;   SKIN: No rashes or lesions  NERVOUS SYSTEM:  Alert & Oriented X0, intubated and sedated    LABS:  02-13    133<L>  |  96<L>  |  27<H>  ----------------------------<  211<H>  4.5   |  28  |  1.48<H>  02-12    132<L>  |  95<L>  |  31<H>  ----------------------------<  219<H>  4.6   |  25  |  1.41<H>  02-11    136  |  97<L>  |  30<H>  ----------------------------<  172<H>  4.8   |  26  |  1.43<H>    Ca    7.9<L>      13 Feb 2024 01:43  Ca    8.0<L>      12 Feb 2024 02:40  Ca    8.1<L>      11 Feb 2024 23:11  Phos  3.5     02-13  Mg     2.10     02-13    TPro  6.3  /  Alb  2.3<L>  /  TBili  0.3  /  DBili  x   /  AST  25  /  ALT  20  /  AlkPhos  101  02-13  TPro  6.2  /  Alb  2.2<L>  /  TBili  0.3  /  DBili  x   /  AST  31  /  ALT  23  /  AlkPhos  96  02-12  TPro  6.5  /  Alb  2.4<L>  /  TBili  0.2  /  DBili  x   /  AST  28  /  ALT  23  /  AlkPhos  97  02-11      PT/INR - ( 13 Feb 2024 01:43 )   PT: 12.0 sec;   INR: 1.07 ratio         PTT - ( 13 Feb 2024 01:43 )  PTT:32.3 sec              Urinalysis Basic - ( 13 Feb 2024 01:43 )    Color: x / Appearance: x / SG: x / pH: x  Gluc: 211 mg/dL / Ketone: x  / Bili: x / Urobili: x   Blood: x / Protein: x / Nitrite: x   Leuk Esterase: x / RBC: x / WBC x   Sq Epi: x / Non Sq Epi: x / Bacteria: x                              7.1    7.35  )-----------( 358      ( 13 Feb 2024 01:43 )             23.0                         7.5    9.59  )-----------( 377      ( 12 Feb 2024 02:40 )             24.3                         7.8    8.35  )-----------( 369      ( 11 Feb 2024 23:11 )             24.1     CAPILLARY BLOOD GLUCOSE      POCT Blood Glucose.: 195 mg/dL (13 Feb 2024 06:11)  POCT Blood Glucose.: 219 mg/dL (12 Feb 2024 23:44)  POCT Blood Glucose.: 206 mg/dL (12 Feb 2024 17:36)  POCT Blood Glucose.: 205 mg/dL (12 Feb 2024 12:16)      RADIOLOGY & ADDITIONAL TESTS:    Imaging Personally Reviewed:  [x ] YES  [ ] NO    Consultants involved in case:   Consultant(s) Notes Reviewed:  [ x] YES  [ ] NO:   Care Discussed with Consultants/Other Providers [x ] YES  [ ] NO

## 2024-02-13 NOTE — PROCEDURE NOTE - NSBRONCHPROCDETAILS_GEN_A_CORE_FT
Bronchoscope inserted through endotracheal tube.    Entire airway examined to the subsegmental level. Bronchoalveolar lavage was conducted.      Endotracheal tube retracted to the conus elasticus under bronchoscopic guidance. Entire length of trachea examined and found to be normal.     Bronchoscope used to visualize entrance of finder needle, followed by the large bore needle, and passage of the wire. Bronchoscope used to visualize the insertion of the punch dilator followed by the large dilator. Following this, the insertion of the tracheostomy tube in the trachea was visualized. The tracheostomy tube balloon was visualized and well inflated.     Bronchoscope withdrawn from endotracheal tube and inserted through tracheostomy tube. Trachea and fox visualized through the tracheostomy tube.     Secretions therapeutically aspirated.     Bronchoscope withdrawn from tracheostomy tube.

## 2024-02-14 LAB
ALBUMIN SERPL ELPH-MCNC: 2.3 G/DL — LOW (ref 3.3–5)
ALBUMIN SERPL ELPH-MCNC: 2.3 G/DL — LOW (ref 3.3–5)
ALP SERPL-CCNC: 81 U/L — SIGNIFICANT CHANGE UP (ref 40–120)
ALP SERPL-CCNC: 84 U/L — SIGNIFICANT CHANGE UP (ref 40–120)
ALT FLD-CCNC: 21 U/L — SIGNIFICANT CHANGE UP (ref 4–41)
ALT FLD-CCNC: 23 U/L — SIGNIFICANT CHANGE UP (ref 4–41)
ANION GAP SERPL CALC-SCNC: 12 MMOL/L — SIGNIFICANT CHANGE UP (ref 7–14)
ANION GAP SERPL CALC-SCNC: 9 MMOL/L — SIGNIFICANT CHANGE UP (ref 7–14)
APTT BLD: 29.4 SEC — SIGNIFICANT CHANGE UP (ref 24.5–35.6)
AST SERPL-CCNC: 22 U/L — SIGNIFICANT CHANGE UP (ref 4–40)
AST SERPL-CCNC: 29 U/L — SIGNIFICANT CHANGE UP (ref 4–40)
BASE EXCESS BLDV CALC-SCNC: 5.4 MMOL/L — HIGH (ref -2–3)
BASE EXCESS BLDV CALC-SCNC: 5.5 MMOL/L — HIGH (ref -2–3)
BILIRUB SERPL-MCNC: 0.3 MG/DL — SIGNIFICANT CHANGE UP (ref 0.2–1.2)
BILIRUB SERPL-MCNC: 0.4 MG/DL — SIGNIFICANT CHANGE UP (ref 0.2–1.2)
BLOOD GAS VENOUS COMPREHENSIVE RESULT: SIGNIFICANT CHANGE UP
BLOOD GAS VENOUS COMPREHENSIVE RESULT: SIGNIFICANT CHANGE UP
BUN SERPL-MCNC: 22 MG/DL — SIGNIFICANT CHANGE UP (ref 7–23)
BUN SERPL-MCNC: 24 MG/DL — HIGH (ref 7–23)
CALCIUM SERPL-MCNC: 8.2 MG/DL — LOW (ref 8.4–10.5)
CALCIUM SERPL-MCNC: 8.4 MG/DL — SIGNIFICANT CHANGE UP (ref 8.4–10.5)
CHLORIDE BLDV-SCNC: 103 MMOL/L — SIGNIFICANT CHANGE UP (ref 96–108)
CHLORIDE BLDV-SCNC: 104 MMOL/L — SIGNIFICANT CHANGE UP (ref 96–108)
CHLORIDE SERPL-SCNC: 101 MMOL/L — SIGNIFICANT CHANGE UP (ref 98–107)
CHLORIDE SERPL-SCNC: 101 MMOL/L — SIGNIFICANT CHANGE UP (ref 98–107)
CO2 BLDV-SCNC: 31.2 MMOL/L — HIGH (ref 22–26)
CO2 BLDV-SCNC: 34.1 MMOL/L — HIGH (ref 22–26)
CO2 SERPL-SCNC: 26 MMOL/L — SIGNIFICANT CHANGE UP (ref 22–31)
CO2 SERPL-SCNC: 29 MMOL/L — SIGNIFICANT CHANGE UP (ref 22–31)
CREAT SERPL-MCNC: 1.62 MG/DL — HIGH (ref 0.5–1.3)
CREAT SERPL-MCNC: 1.63 MG/DL — HIGH (ref 0.5–1.3)
CULTURE RESULTS: ABNORMAL
EGFR: 40 ML/MIN/1.73M2 — LOW
EGFR: 40 ML/MIN/1.73M2 — LOW
GAS PNL BLDV: 137 MMOL/L — SIGNIFICANT CHANGE UP (ref 136–145)
GAS PNL BLDV: 139 MMOL/L — SIGNIFICANT CHANGE UP (ref 136–145)
GLUCOSE BLDC GLUCOMTR-MCNC: 118 MG/DL — HIGH (ref 70–99)
GLUCOSE BLDC GLUCOMTR-MCNC: 130 MG/DL — HIGH (ref 70–99)
GLUCOSE BLDC GLUCOMTR-MCNC: 145 MG/DL — HIGH (ref 70–99)
GLUCOSE BLDC GLUCOMTR-MCNC: 220 MG/DL — HIGH (ref 70–99)
GLUCOSE BLDV-MCNC: 124 MG/DL — HIGH (ref 70–99)
GLUCOSE BLDV-MCNC: 130 MG/DL — HIGH (ref 70–99)
GLUCOSE SERPL-MCNC: 122 MG/DL — HIGH (ref 70–99)
GLUCOSE SERPL-MCNC: 133 MG/DL — HIGH (ref 70–99)
GRAM STN FLD: SIGNIFICANT CHANGE UP
HCO3 BLDV-SCNC: 30 MMOL/L — HIGH (ref 22–29)
HCO3 BLDV-SCNC: 32 MMOL/L — HIGH (ref 22–29)
HCT VFR BLD CALC: 22.9 % — LOW (ref 39–50)
HCT VFR BLDA CALC: 23 % — LOW (ref 39–51)
HCT VFR BLDA CALC: 24 % — LOW (ref 39–51)
HGB BLD CALC-MCNC: 7.5 G/DL — LOW (ref 12.6–17.4)
HGB BLD CALC-MCNC: 7.9 G/DL — LOW (ref 12.6–17.4)
HGB BLD-MCNC: 7.2 G/DL — LOW (ref 13–17)
INR BLD: 1.04 RATIO — SIGNIFICANT CHANGE UP (ref 0.85–1.18)
LACTATE BLDV-MCNC: 1.3 MMOL/L — SIGNIFICANT CHANGE UP (ref 0.5–2)
LACTATE BLDV-MCNC: 1.4 MMOL/L — SIGNIFICANT CHANGE UP (ref 0.5–2)
MAGNESIUM SERPL-MCNC: 2 MG/DL — SIGNIFICANT CHANGE UP (ref 1.6–2.6)
MCHC RBC-ENTMCNC: 29.6 PG — SIGNIFICANT CHANGE UP (ref 27–34)
MCHC RBC-ENTMCNC: 31.4 GM/DL — LOW (ref 32–36)
MCV RBC AUTO: 94.2 FL — SIGNIFICANT CHANGE UP (ref 80–100)
NIGHT BLUE STAIN TISS: SIGNIFICANT CHANGE UP
NRBC # BLD: 0 /100 WBCS — SIGNIFICANT CHANGE UP (ref 0–0)
NRBC # FLD: 0 K/UL — SIGNIFICANT CHANGE UP (ref 0–0)
PCO2 BLDV: 43 MMHG — SIGNIFICANT CHANGE UP (ref 42–55)
PCO2 BLDV: 61 MMHG — HIGH (ref 42–55)
PH BLDV: 7.33 — SIGNIFICANT CHANGE UP (ref 7.32–7.43)
PH BLDV: 7.45 — HIGH (ref 7.32–7.43)
PHOSPHATE SERPL-MCNC: 4.1 MG/DL — SIGNIFICANT CHANGE UP (ref 2.5–4.5)
PLATELET # BLD AUTO: 379 K/UL — SIGNIFICANT CHANGE UP (ref 150–400)
PO2 BLDV: 30 MMHG — SIGNIFICANT CHANGE UP (ref 25–45)
PO2 BLDV: 50 MMHG — HIGH (ref 25–45)
POTASSIUM BLDV-SCNC: 3.8 MMOL/L — SIGNIFICANT CHANGE UP (ref 3.5–5.1)
POTASSIUM BLDV-SCNC: 4 MMOL/L — SIGNIFICANT CHANGE UP (ref 3.5–5.1)
POTASSIUM SERPL-MCNC: 3.8 MMOL/L — SIGNIFICANT CHANGE UP (ref 3.5–5.3)
POTASSIUM SERPL-MCNC: 4 MMOL/L — SIGNIFICANT CHANGE UP (ref 3.5–5.3)
POTASSIUM SERPL-SCNC: 3.8 MMOL/L — SIGNIFICANT CHANGE UP (ref 3.5–5.3)
POTASSIUM SERPL-SCNC: 4 MMOL/L — SIGNIFICANT CHANGE UP (ref 3.5–5.3)
PROT SERPL-MCNC: 6.4 G/DL — SIGNIFICANT CHANGE UP (ref 6–8.3)
PROT SERPL-MCNC: 6.7 G/DL — SIGNIFICANT CHANGE UP (ref 6–8.3)
PROTHROM AB SERPL-ACNC: 11.6 SEC — SIGNIFICANT CHANGE UP (ref 9.5–13)
RBC # BLD: 2.43 M/UL — LOW (ref 4.2–5.8)
RBC # FLD: 17.8 % — HIGH (ref 10.3–14.5)
SAO2 % BLDV: 58.3 % — LOW (ref 67–88)
SAO2 % BLDV: 84 % — SIGNIFICANT CHANGE UP (ref 67–88)
SODIUM SERPL-SCNC: 139 MMOL/L — SIGNIFICANT CHANGE UP (ref 135–145)
SODIUM SERPL-SCNC: 139 MMOL/L — SIGNIFICANT CHANGE UP (ref 135–145)
SPECIMEN SOURCE: SIGNIFICANT CHANGE UP
WBC # BLD: 9.07 K/UL — SIGNIFICANT CHANGE UP (ref 3.8–10.5)
WBC # FLD AUTO: 9.07 K/UL — SIGNIFICANT CHANGE UP (ref 3.8–10.5)

## 2024-02-14 PROCEDURE — 99291 CRITICAL CARE FIRST HOUR: CPT

## 2024-02-14 PROCEDURE — 99232 SBSQ HOSP IP/OBS MODERATE 35: CPT

## 2024-02-14 RX ORDER — FUROSEMIDE 40 MG
20 TABLET ORAL ONCE
Refills: 0 | Status: COMPLETED | OUTPATIENT
Start: 2024-02-14 | End: 2024-02-14

## 2024-02-14 RX ORDER — MIDODRINE HYDROCHLORIDE 2.5 MG/1
20 TABLET ORAL EVERY 8 HOURS
Refills: 0 | Status: DISCONTINUED | OUTPATIENT
Start: 2024-02-14 | End: 2024-02-15

## 2024-02-14 RX ORDER — NOREPINEPHRINE BITARTRATE/D5W 8 MG/250ML
0.05 PLASTIC BAG, INJECTION (ML) INTRAVENOUS
Qty: 16 | Refills: 0 | Status: DISCONTINUED | OUTPATIENT
Start: 2024-02-14 | End: 2024-02-16

## 2024-02-14 RX ADMIN — PROPOFOL 4.76 MICROGRAM(S)/KG/MIN: 10 INJECTION, EMULSION INTRAVENOUS at 19:11

## 2024-02-14 RX ADMIN — Medication 1 DROP(S): at 06:08

## 2024-02-14 RX ADMIN — MIDODRINE HYDROCHLORIDE 10 MILLIGRAM(S): 2.5 TABLET ORAL at 06:02

## 2024-02-14 RX ADMIN — Medication 81 MILLIGRAM(S): at 11:56

## 2024-02-14 RX ADMIN — Medication 3.72 MICROGRAM(S)/KG/MIN: at 19:11

## 2024-02-14 RX ADMIN — CHLORHEXIDINE GLUCONATE 15 MILLILITER(S): 213 SOLUTION TOPICAL at 17:24

## 2024-02-14 RX ADMIN — LEVETIRACETAM 400 MILLIGRAM(S): 250 TABLET, FILM COATED ORAL at 06:10

## 2024-02-14 RX ADMIN — PIPERACILLIN AND TAZOBACTAM 25 GRAM(S): 4; .5 INJECTION, POWDER, LYOPHILIZED, FOR SOLUTION INTRAVENOUS at 09:23

## 2024-02-14 RX ADMIN — SODIUM CHLORIDE 4 MILLILITER(S): 9 INJECTION INTRAMUSCULAR; INTRAVENOUS; SUBCUTANEOUS at 21:54

## 2024-02-14 RX ADMIN — Medication 1 APPLICATION(S): at 21:54

## 2024-02-14 RX ADMIN — PIPERACILLIN AND TAZOBACTAM 25 GRAM(S): 4; .5 INJECTION, POWDER, LYOPHILIZED, FOR SOLUTION INTRAVENOUS at 02:03

## 2024-02-14 RX ADMIN — MIDODRINE HYDROCHLORIDE 20 MILLIGRAM(S): 2.5 TABLET ORAL at 13:06

## 2024-02-14 RX ADMIN — FENTANYL CITRATE 3.97 MICROGRAM(S)/KG/HR: 50 INJECTION INTRAVENOUS at 19:11

## 2024-02-14 RX ADMIN — Medication 3 MILLILITER(S): at 09:47

## 2024-02-14 RX ADMIN — Medication 1 GRAM(S): at 06:06

## 2024-02-14 RX ADMIN — PIPERACILLIN AND TAZOBACTAM 25 GRAM(S): 4; .5 INJECTION, POWDER, LYOPHILIZED, FOR SOLUTION INTRAVENOUS at 17:22

## 2024-02-14 RX ADMIN — Medication 3 MILLILITER(S): at 15:26

## 2024-02-14 RX ADMIN — SODIUM CHLORIDE 4 MILLILITER(S): 9 INJECTION INTRAMUSCULAR; INTRAVENOUS; SUBCUTANEOUS at 09:47

## 2024-02-14 RX ADMIN — PANTOPRAZOLE SODIUM 40 MILLIGRAM(S): 20 TABLET, DELAYED RELEASE ORAL at 17:24

## 2024-02-14 RX ADMIN — MIDODRINE HYDROCHLORIDE 20 MILLIGRAM(S): 2.5 TABLET ORAL at 21:07

## 2024-02-14 RX ADMIN — Medication 3 MILLILITER(S): at 21:54

## 2024-02-14 RX ADMIN — CHLORHEXIDINE GLUCONATE 1 APPLICATION(S): 213 SOLUTION TOPICAL at 11:57

## 2024-02-14 RX ADMIN — PANTOPRAZOLE SODIUM 40 MILLIGRAM(S): 20 TABLET, DELAYED RELEASE ORAL at 06:07

## 2024-02-14 RX ADMIN — Medication 1 GRAM(S): at 17:24

## 2024-02-14 RX ADMIN — Medication 20 MILLIGRAM(S): at 12:50

## 2024-02-14 RX ADMIN — Medication 1 DROP(S): at 17:25

## 2024-02-14 RX ADMIN — LEVETIRACETAM 400 MILLIGRAM(S): 250 TABLET, FILM COATED ORAL at 17:26

## 2024-02-14 RX ADMIN — PROPOFOL 4.76 MICROGRAM(S)/KG/MIN: 10 INJECTION, EMULSION INTRAVENOUS at 07:38

## 2024-02-14 RX ADMIN — MUPIROCIN 1 APPLICATION(S): 20 OINTMENT TOPICAL at 17:27

## 2024-02-14 RX ADMIN — Medication 3.72 MICROGRAM(S)/KG/MIN: at 02:02

## 2024-02-14 RX ADMIN — ESCITALOPRAM OXALATE 10 MILLIGRAM(S): 10 TABLET, FILM COATED ORAL at 11:57

## 2024-02-14 RX ADMIN — Medication 3.72 MICROGRAM(S)/KG/MIN: at 07:39

## 2024-02-14 RX ADMIN — CHLORHEXIDINE GLUCONATE 15 MILLILITER(S): 213 SOLUTION TOPICAL at 06:07

## 2024-02-14 RX ADMIN — FENTANYL CITRATE 3.97 MICROGRAM(S)/KG/HR: 50 INJECTION INTRAVENOUS at 07:39

## 2024-02-14 RX ADMIN — MUPIROCIN 1 APPLICATION(S): 20 OINTMENT TOPICAL at 06:06

## 2024-02-14 RX ADMIN — Medication 3 MILLILITER(S): at 03:17

## 2024-02-14 RX ADMIN — Medication 1 DROP(S): at 11:55

## 2024-02-14 RX ADMIN — Medication 2 MILLIGRAM(S): at 21:07

## 2024-02-14 NOTE — PROGRESS NOTE ADULT - ATTENDING COMMENTS
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 Course c/b acute cholecystitis s/p perc asa 1/26 by IR, also with recent SMA stent.   Patient failed trial of extubation 2/2 ?aspiration, ?mucus plugging iso AMS 2/2 toxic metabolic encephalopathy.     Currently s/p tracheostomy 2/13/24.    N: Myoclonus, ?Seizure do  Toxic metabolic encephalopathy  MRI negative for acute pathology  Petechial Hemorrhages   - Daily sedation vacation  - Pending procedural planning will wean sedation as able; Prop 40 and Fent  - Lexapro and Keppra  - Optho for hemorrhages  CV: Sedation related hypotension  Shock: Cardiogenic v. distributive v. vasoplegic   AF with RVR  CAD s/p CABG and PCI (2022)   - Midodrine increase to 20 q 8 hours  - Levo with MAP goal >65  - ASA for CAD, holding Brillinta (plan to resume post procedures)  P Acute hypoxemic/hypercapnic respiratory failure requiring intubation mechanical ventilation  Multifocal Pneumonia  MSSA pneumonia  Pleural effusions  Aspiration   COVID  - Lung protective ventilation  - Daily PSV, tolerating 5/5, 30%  - Scant secretions, minimal suctioning requirement  - Duonebs q 6 hours, and airway clearance: inhaled hypertonic saline q 12  - Goal net negative to even  GI: Acute cholecystitis s/p biliary tube  SMA stent   - Planned for PEG in the coming days  - TF on hold pending possible PEG  Renal:   - Trend creatinine, trending   - Replete lytes as needed  - Diuresis as tolerated, goal net neg  ID: Acute asa s/p perc asa  Recurrent fevers, now afebrile on Abx  - Infectious work up negative thus far, will follow up pending studies  - Zosyn - plan for 7 days  Heme: Anemia of chronic disease  Endo:   - Avoid hypoglycemia, adjust Insulin while NPO    DVT: resume based on procedural plan  GI: PPI 40 BID iso GIB    Full code  Prognosis guarded. Updated family at bedside

## 2024-02-14 NOTE — PROGRESS NOTE ADULT - SUBJECTIVE AND OBJECTIVE BOX
Aashish Boyer MD  Interventional Cardiology / Endovascular Specialist  [place] Office : [address] [place] [id number] Tel:   [place] Office : [address] [facility] [address]  Tel: [phone]      Subjective/Overnight events: Patient lying in bed remains intubated in MICU  	  MEDICATIONS:  aspirin  chewable 81 milliGRAM(s) Enteral Tube daily  doxazosin 2 milliGRAM(s) Oral at bedtime  midodrine 20 milliGRAM(s) Oral every 8 hours  norepinephrine Infusion 0.05 MICROgram(s)/kG/Min IV Continuous <Continuous>  tranexamic acid Injectable for Topical Use 10 milliLiter(s) Topical once    piperacillin/tazobactam IVPB.. 3.375 Gram(s) IV Intermittent every 8 hours    albuterol/ipratropium for Nebulization 3 milliLiter(s) Nebulizer every 6 hours  sodium chloride 3%  Inhalation 4 milliLiter(s) Inhalation two times a day    acetaminophen   IVPB .. 1000 milliGRAM(s) IV Intermittent once  escitalopram 10 milliGRAM(s) Oral daily  fentaNYL   Infusion. 0.5 MICROgram(s)/kG/Hr IV Continuous <Continuous>  levETIRAcetam  IVPB 500 milliGRAM(s) IV Intermittent every 12 hours  propofol Infusion 10.004 MICROgram(s)/kG/Min IV Continuous <Continuous>    pantoprazole  Injectable 40 milliGRAM(s) IV Push every 12 hours  sucralfate suspension 1 Gram(s) Enteral Tube two times a day    dextrose 50% Injectable 25 Gram(s) IV Push once  dextrose 50% Injectable 12.5 Gram(s) IV Push once  dextrose 50% Injectable 25 Gram(s) IV Push once  dextrose Oral Gel 15 Gram(s) Oral once PRN  glucagon  Injectable 1 milliGRAM(s) IntraMuscular once  insulin glargine Injectable (LANTUS) 12 Unit(s) SubCutaneous <User Schedule>  insulin lispro (ADMELOG) corrective regimen sliding scale   SubCutaneous every 6 hours    artificial  tears Solution 1 Drop(s) Left EYE four times a day  chlorhexidine 0.12% Liquid 15 milliLiter(s) Oral Mucosa every 12 hours  chlorhexidine 2% Cloths 1 Application(s) Topical daily  dextrose 5%. 1000 milliLiter(s) IV Continuous <Continuous>  dextrose 5%. 1000 milliLiter(s) IV Continuous <Continuous>  mupirocin 2% Ointment 1 Application(s) Topical two times a day  petrolatum Ophthalmic Ointment 1 Application(s) Left EYE at bedtime      PAST MEDICAL/SURGICAL HISTORY  PAST MEDICAL & SURGICAL HISTORY:  Hyperlipemia      Hypertension      Coronary Artery Disease      Diabetes Mellitus Type II      Stented Coronary Artery  total 5 stents, last stent 5/2019      Neuropathy      Myocardial infarction      Stroke  mild left facial numbness   no other residuals verbalized      Myoclonic jerking      Stage 3 chronic kidney disease      History of Cataract Extraction      Hx of CABG      H/O coronary angiogram      S/P coronary artery stent placement  1/6/09      S/P placement of cardiac pacemaker          SOCIAL HISTORY: Substance Use (street drugs): ( x ) never used  (  ) other:    FAMILY HISTORY:  No pertinent family history in first degree relatives          PHYSICAL EXAM:  T(C): 36.9 (02-14-24 @ 08:00), Max: 37 (02-14-24 @ 04:00)  HR: 96 (02-14-24 @ 14:00) (89 - 107)  BP: 129/45 (02-14-24 @ 14:00) (84/37 - 141/60)  RR: 16 (02-14-24 @ 14:00) (0 - 23)  SpO2: 100% (02-14-24 @ 14:00) (95% - 100%)  Wt(kg): --  I&O's Summary    13 Feb 2024 07:01  -  14 Feb 2024 07:00  --------------------------------------------------------  IN: 1294.9 mL / OUT: 2315 mL / NET: -1020.1 mL    14 Feb 2024 07:01  -  14 Feb 2024 15:13  --------------------------------------------------------  IN: 442 mL / OUT: 750 mL / NET: -308 mL          GENERAL: NAD  EYES:  conjunctiva and sclera clear  ENMT: No tonsillar erythema, exudates, or enlargement  Cardiovascular: Normal S1 S2, No JVD, No murmurs, No edema  Respiratory: Lungs clear to auscultation	  Gastrointestinal:  Soft,   Extremities: No edema                                     7.2    9.07  )-----------( 379      ( 14 Feb 2024 01:50 )             22.9     02-14    139  |  101  |  24<H>  ----------------------------<  122<H>  3.8   |  29  |  1.63<H>    Ca    8.2<L>      14 Feb 2024 01:50  Phos  4.1     02-14  Mg     2.00     02-14    TPro  6.4  /  Alb  2.3<L>  /  TBili  0.3  /  DBili  x   /  AST  22  /  ALT  21  /  AlkPhos  84  02-14    proBNP:   Lipid Profile:   HgA1c:   TSH:     Consultant(s) Notes Reviewed:  [x ] YES  [ ] NO    Care Discussed with Consultants/Other Providers [ x] YES  [ ] NO    Imaging Personally Reviewed independently:  [x] YES  [ ] NO    All labs, radiologic studies, vitals, orders and medications list reviewed. Patient is seen and examined at bedside. Case discussed with medical team.

## 2024-02-14 NOTE — PROGRESS NOTE ADULT - SUBJECTIVE AND OBJECTIVE BOX
***************************************************************  Candelario (Renee) Shantel PGY2  MICU  ***************************************************************    SHAIK CHARANJIT  89y  MRN: [id number]    Patient is a 89y old  Male who presents with a chief complaint of COVID19, Chest pain (13 Feb 2024 18:14)      Subjective: no events ON. Denies fever, CP, SOB, abn pain, N/V, dysuria. Tolerating diet.      MEDICATIONS  (STANDING):  acetaminophen   IVPB .. 1000 milliGRAM(s) IV Intermittent once  albuterol/ipratropium for Nebulization 3 milliLiter(s) Nebulizer every 6 hours  artificial  tears Solution 1 Drop(s) Left EYE four times a day  aspirin  chewable 81 milliGRAM(s) Enteral Tube daily  chlorhexidine 0.12% Liquid 15 milliLiter(s) Oral Mucosa every 12 hours  chlorhexidine 2% Cloths 1 Application(s) Topical daily  dextrose 5%. 1000 milliLiter(s) (100 mL/Hr) IV Continuous <Continuous>  dextrose 5%. 1000 milliLiter(s) (50 mL/Hr) IV Continuous <Continuous>  dextrose 50% Injectable 25 Gram(s) IV Push once  dextrose 50% Injectable 12.5 Gram(s) IV Push once  dextrose 50% Injectable 25 Gram(s) IV Push once  doxazosin 2 milliGRAM(s) Oral at bedtime  escitalopram 10 milliGRAM(s) Oral daily  fentaNYL   Infusion. 0.5 MICROgram(s)/kG/Hr (3.97 mL/Hr) IV Continuous <Continuous>  glucagon  Injectable 1 milliGRAM(s) IntraMuscular once  insulin glargine Injectable (LANTUS) 12 Unit(s) SubCutaneous <User Schedule>  insulin lispro (ADMELOG) corrective regimen sliding scale   SubCutaneous every 6 hours  levETIRAcetam  IVPB 500 milliGRAM(s) IV Intermittent every 12 hours  midodrine 10 milliGRAM(s) Oral every 8 hours  mupirocin 2% Ointment 1 Application(s) Topical two times a day  norepinephrine Infusion 0.05 MICROgram(s)/kG/Min (3.72 mL/Hr) IV Continuous <Continuous>  pantoprazole  Injectable 40 milliGRAM(s) IV Push every 12 hours  petrolatum Ophthalmic Ointment 1 Application(s) Left EYE at bedtime  piperacillin/tazobactam IVPB.. 3.375 Gram(s) IV Intermittent every 8 hours  propofol Infusion 10.004 MICROgram(s)/kG/Min (4.76 mL/Hr) IV Continuous <Continuous>  sodium chloride 3%  Inhalation 4 milliLiter(s) Inhalation two times a day  sucralfate suspension 1 Gram(s) Enteral Tube two times a day  tranexamic acid Injectable for Topical Use 10 milliLiter(s) Topical once    MEDICATIONS  (PRN):  dextrose Oral Gel 15 Gram(s) Oral once PRN Blood Glucose LESS THAN 70 milliGRAM(s)/deciliter      Objective:    Vitals: Vital Signs Last 24 Hrs  T(C): 37 (02-14-24 @ 04:00), Max: 37.3 (02-13-24 @ 12:00)  T(F): 98.6 (02-14-24 @ 04:00), Max: 99.2 (02-13-24 @ 12:00)  HR: 98 (02-14-24 @ 06:00) (89 - 107)  BP: 112/43 (02-14-24 @ 06:00) (84/37 - 141/60)  BP(mean): 61 (02-14-24 @ 06:00) (48 - 82)  RR: 12 (02-14-24 @ 06:00) (0 - 23)  SpO2: 100% (02-14-24 @ 06:00) (95% - 100%)            I&O's Summary    13 Feb 2024 07:01  -  14 Feb 2024 07:00  --------------------------------------------------------  IN: 1262.9 mL / OUT: 2285 mL / NET: -1022.1 mL        PHYSICAL EXAM:  GENERAL: NAD  HEAD:  Atraumatic, Normocephalic  EYES: EOMI, conjunctiva and sclera clear  CHEST/LUNG: Clear to auscultation bilaterally; No rales, rhonchi, wheezing, or rubs  HEART: Regular rate and rhythm; No murmurs, rubs, or gallops  ABDOMEN: Soft, Nontender, Nondistended;   SKIN: No rashes or lesions  NERVOUS SYSTEM:  Alert & Oriented X3, no focal deficits    LABS:  02-14    139  |  101  |  24<H>  ----------------------------<  122<H>  3.8   |  29  |  1.63<H>  02-13    133<L>  |  96<L>  |  27<H>  ----------------------------<  211<H>  4.5   |  28  |  1.48<H>  02-12    132<L>  |  95<L>  |  31<H>  ----------------------------<  219<H>  4.6   |  25  |  1.41<H>    Ca    8.2<L>      14 Feb 2024 01:50  Ca    7.9<L>      13 Feb 2024 01:43  Ca    8.0<L>      12 Feb 2024 02:40  Phos  4.1     02-14  Mg     2.00     02-14    TPro  6.4  /  Alb  2.3<L>  /  TBili  0.3  /  DBili  x   /  AST  22  /  ALT  21  /  AlkPhos  84  02-14  TPro  6.3  /  Alb  2.3<L>  /  TBili  0.3  /  DBili  x   /  AST  25  /  ALT  20  /  AlkPhos  101  02-13  TPro  6.2  /  Alb  2.2<L>  /  TBili  0.3  /  DBili  x   /  AST  31  /  ALT  23  /  AlkPhos  96  02-12      PT/INR - ( 14 Feb 2024 01:50 )   PT: 11.6 sec;   INR: 1.04 ratio         PTT - ( 14 Feb 2024 01:50 )  PTT:29.4 sec              Urinalysis Basic - ( 14 Feb 2024 01:50 )    Color: x / Appearance: x / SG: x / pH: x  Gluc: 122 mg/dL / Ketone: x  / Bili: x / Urobili: x   Blood: x / Protein: x / Nitrite: x   Leuk Esterase: x / RBC: x / WBC x   Sq Epi: x / Non Sq Epi: x / Bacteria: x                              7.2    9.07  )-----------( 379      ( 14 Feb 2024 01:50 )             22.9                         7.1    7.35  )-----------( 358      ( 13 Feb 2024 01:43 )             23.0                         7.5    9.59  )-----------( 377      ( 12 Feb 2024 02:40 )             24.3     CAPILLARY BLOOD GLUCOSE      POCT Blood Glucose.: 118 mg/dL (14 Feb 2024 05:29)  POCT Blood Glucose.: 163 mg/dL (13 Feb 2024 23:31)  POCT Blood Glucose.: 217 mg/dL (13 Feb 2024 18:04)  POCT Blood Glucose.: 217 mg/dL (13 Feb 2024 12:45)      RADIOLOGY & ADDITIONAL TESTS:    Imaging Personally Reviewed:  [x ] YES  [ ] NO    Consultants involved in case:   Consultant(s) Notes Reviewed:  [ x] YES  [ ] NO:   Care Discussed with Consultants/Other Providers [x ] YES  [ ] NO         ***************************************************************  Candelario (Renee) Shantel PGY2  MICU  ***************************************************************    SHAIK CHARANJIT  89y  MRN: [id number]    Patient is a 89y old  Male who presents with a chief complaint of COVID19, Chest pain (13 Feb 2024 18:14)      Subjective: NAEO. remains intubated and sedated    MEDICATIONS  (STANDING):  acetaminophen   IVPB .. 1000 milliGRAM(s) IV Intermittent once  albuterol/ipratropium for Nebulization 3 milliLiter(s) Nebulizer every 6 hours  artificial  tears Solution 1 Drop(s) Left EYE four times a day  aspirin  chewable 81 milliGRAM(s) Enteral Tube daily  chlorhexidine 0.12% Liquid 15 milliLiter(s) Oral Mucosa every 12 hours  chlorhexidine 2% Cloths 1 Application(s) Topical daily  dextrose 5%. 1000 milliLiter(s) (100 mL/Hr) IV Continuous <Continuous>  dextrose 5%. 1000 milliLiter(s) (50 mL/Hr) IV Continuous <Continuous>  dextrose 50% Injectable 25 Gram(s) IV Push once  dextrose 50% Injectable 12.5 Gram(s) IV Push once  dextrose 50% Injectable 25 Gram(s) IV Push once  doxazosin 2 milliGRAM(s) Oral at bedtime  escitalopram 10 milliGRAM(s) Oral daily  fentaNYL   Infusion. 0.5 MICROgram(s)/kG/Hr (3.97 mL/Hr) IV Continuous <Continuous>  glucagon  Injectable 1 milliGRAM(s) IntraMuscular once  insulin glargine Injectable (LANTUS) 12 Unit(s) SubCutaneous <User Schedule>  insulin lispro (ADMELOG) corrective regimen sliding scale   SubCutaneous every 6 hours  levETIRAcetam  IVPB 500 milliGRAM(s) IV Intermittent every 12 hours  midodrine 10 milliGRAM(s) Oral every 8 hours  mupirocin 2% Ointment 1 Application(s) Topical two times a day  norepinephrine Infusion 0.05 MICROgram(s)/kG/Min (3.72 mL/Hr) IV Continuous <Continuous>  pantoprazole  Injectable 40 milliGRAM(s) IV Push every 12 hours  petrolatum Ophthalmic Ointment 1 Application(s) Left EYE at bedtime  piperacillin/tazobactam IVPB.. 3.375 Gram(s) IV Intermittent every 8 hours  propofol Infusion 10.004 MICROgram(s)/kG/Min (4.76 mL/Hr) IV Continuous <Continuous>  sodium chloride 3%  Inhalation 4 milliLiter(s) Inhalation two times a day  sucralfate suspension 1 Gram(s) Enteral Tube two times a day  tranexamic acid Injectable for Topical Use 10 milliLiter(s) Topical once    MEDICATIONS  (PRN):  dextrose Oral Gel 15 Gram(s) Oral once PRN Blood Glucose LESS THAN 70 milliGRAM(s)/deciliter      Objective:    Vitals: Vital Signs Last 24 Hrs  T(C): 37 (02-14-24 @ 04:00), Max: 37.3 (02-13-24 @ 12:00)  T(F): 98.6 (02-14-24 @ 04:00), Max: 99.2 (02-13-24 @ 12:00)  HR: 98 (02-14-24 @ 06:00) (89 - 107)  BP: 112/43 (02-14-24 @ 06:00) (84/37 - 141/60)  BP(mean): 61 (02-14-24 @ 06:00) (48 - 82)  RR: 12 (02-14-24 @ 06:00) (0 - 23)  SpO2: 100% (02-14-24 @ 06:00) (95% - 100%)            I&O's Summary    13 Feb 2024 07:01  -  14 Feb 2024 07:00  --------------------------------------------------------  IN: 1262.9 mL / OUT: 2285 mL / NET: -1022.1 mL        PHYSICAL EXAM:  GENERAL: NAD, trach in place, intubated and sedated  HEAD:  Atraumatic, Normocephalic  EYES: EOMI, conjunctiva and sclera clear  CHEST/LUNG: Clear to auscultation bilaterally; No rales, rhonchi, wheezing, or rubs  HEART: Regular rate and rhythm; No murmurs, rubs, or gallops  ABDOMEN: Soft, Nontender, Nondistended;   SKIN: No rashes or lesions  NERVOUS SYSTEM:  Alert & Oriented X3, no focal deficits    LABS:  02-14    139  |  101  |  24<H>  ----------------------------<  122<H>  3.8   |  29  |  1.63<H>  02-13    133<L>  |  96<L>  |  27<H>  ----------------------------<  211<H>  4.5   |  28  |  1.48<H>  02-12    132<L>  |  95<L>  |  31<H>  ----------------------------<  219<H>  4.6   |  25  |  1.41<H>    Ca    8.2<L>      14 Feb 2024 01:50  Ca    7.9<L>      13 Feb 2024 01:43  Ca    8.0<L>      12 Feb 2024 02:40  Phos  4.1     02-14  Mg     2.00     02-14    TPro  6.4  /  Alb  2.3<L>  /  TBili  0.3  /  DBili  x   /  AST  22  /  ALT  21  /  AlkPhos  84  02-14  TPro  6.3  /  Alb  2.3<L>  /  TBili  0.3  /  DBili  x   /  AST  25  /  ALT  20  /  AlkPhos  101  02-13  TPro  6.2  /  Alb  2.2<L>  /  TBili  0.3  /  DBili  x   /  AST  31  /  ALT  23  /  AlkPhos  96  02-12      PT/INR - ( 14 Feb 2024 01:50 )   PT: 11.6 sec;   INR: 1.04 ratio         PTT - ( 14 Feb 2024 01:50 )  PTT:29.4 sec              Urinalysis Basic - ( 14 Feb 2024 01:50 )    Color: x / Appearance: x / SG: x / pH: x  Gluc: 122 mg/dL / Ketone: x  / Bili: x / Urobili: x   Blood: x / Protein: x / Nitrite: x   Leuk Esterase: x / RBC: x / WBC x   Sq Epi: x / Non Sq Epi: x / Bacteria: x                              7.2    9.07  )-----------( 379      ( 14 Feb 2024 01:50 )             22.9                         7.1    7.35  )-----------( 358      ( 13 Feb 2024 01:43 )             23.0                         7.5    9.59  )-----------( 377      ( 12 Feb 2024 02:40 )             24.3     CAPILLARY BLOOD GLUCOSE      POCT Blood Glucose.: 118 mg/dL (14 Feb 2024 05:29)  POCT Blood Glucose.: 163 mg/dL (13 Feb 2024 23:31)  POCT Blood Glucose.: 217 mg/dL (13 Feb 2024 18:04)  POCT Blood Glucose.: 217 mg/dL (13 Feb 2024 12:45)      RADIOLOGY & ADDITIONAL TESTS:    Imaging Personally Reviewed:  [x ] YES  [ ] NO    Consultants involved in case:   Consultant(s) Notes Reviewed:  [ x] YES  [ ] NO:   Care Discussed with Consultants/Other Providers [x ] YES  [ ] NO         ***************************************************************  Candelario (Renee) Shantel PGY2  MICU  ***************************************************************    SHAIK CHARANJIT  89y  MRN: [id number]    Patient is a 89y old  Male who presents with a chief complaint of COVID19, Chest pain (13 Feb 2024 18:14)      Subjective: NAEO. remains intubated and sedated    MEDICATIONS  (STANDING):  acetaminophen   IVPB .. 1000 milliGRAM(s) IV Intermittent once  albuterol/ipratropium for Nebulization 3 milliLiter(s) Nebulizer every 6 hours  artificial  tears Solution 1 Drop(s) Left EYE four times a day  aspirin  chewable 81 milliGRAM(s) Enteral Tube daily  chlorhexidine 0.12% Liquid 15 milliLiter(s) Oral Mucosa every 12 hours  chlorhexidine 2% Cloths 1 Application(s) Topical daily  dextrose 5%. 1000 milliLiter(s) (100 mL/Hr) IV Continuous <Continuous>  dextrose 5%. 1000 milliLiter(s) (50 mL/Hr) IV Continuous <Continuous>  dextrose 50% Injectable 25 Gram(s) IV Push once  dextrose 50% Injectable 12.5 Gram(s) IV Push once  dextrose 50% Injectable 25 Gram(s) IV Push once  doxazosin 2 milliGRAM(s) Oral at bedtime  escitalopram 10 milliGRAM(s) Oral daily  fentaNYL   Infusion. 0.5 MICROgram(s)/kG/Hr (3.97 mL/Hr) IV Continuous <Continuous>  glucagon  Injectable 1 milliGRAM(s) IntraMuscular once  insulin glargine Injectable (LANTUS) 12 Unit(s) SubCutaneous <User Schedule>  insulin lispro (ADMELOG) corrective regimen sliding scale   SubCutaneous every 6 hours  levETIRAcetam  IVPB 500 milliGRAM(s) IV Intermittent every 12 hours  midodrine 10 milliGRAM(s) Oral every 8 hours  mupirocin 2% Ointment 1 Application(s) Topical two times a day  norepinephrine Infusion 0.05 MICROgram(s)/kG/Min (3.72 mL/Hr) IV Continuous <Continuous>  pantoprazole  Injectable 40 milliGRAM(s) IV Push every 12 hours  petrolatum Ophthalmic Ointment 1 Application(s) Left EYE at bedtime  piperacillin/tazobactam IVPB.. 3.375 Gram(s) IV Intermittent every 8 hours  propofol Infusion 10.004 MICROgram(s)/kG/Min (4.76 mL/Hr) IV Continuous <Continuous>  sodium chloride 3%  Inhalation 4 milliLiter(s) Inhalation two times a day  sucralfate suspension 1 Gram(s) Enteral Tube two times a day  tranexamic acid Injectable for Topical Use 10 milliLiter(s) Topical once    MEDICATIONS  (PRN):  dextrose Oral Gel 15 Gram(s) Oral once PRN Blood Glucose LESS THAN 70 milliGRAM(s)/deciliter      Objective:    Vitals: Vital Signs Last 24 Hrs  T(C): 37 (02-14-24 @ 04:00), Max: 37.3 (02-13-24 @ 12:00)  T(F): 98.6 (02-14-24 @ 04:00), Max: 99.2 (02-13-24 @ 12:00)  HR: 98 (02-14-24 @ 06:00) (89 - 107)  BP: 112/43 (02-14-24 @ 06:00) (84/37 - 141/60)  BP(mean): 61 (02-14-24 @ 06:00) (48 - 82)  RR: 12 (02-14-24 @ 06:00) (0 - 23)  SpO2: 100% (02-14-24 @ 06:00) (95% - 100%)            I&O's Summary    13 Feb 2024 07:01  -  14 Feb 2024 07:00  --------------------------------------------------------  IN: 1262.9 mL / OUT: 2285 mL / NET: -1022.1 mL        PHYSICAL EXAM:  GENERAL: NAD, trach in place, intubated and sedated  HEAD:  Atraumatic, Normocephalic  EYES: EOMI, conjunctiva and sclera clear  CHEST/LUNG: ventilator breath sounds  HEART: Regular rate and rhythm; No murmurs, rubs, or gallops  ABDOMEN: Soft, Nontender, Nondistended;   SKIN: No rashes or lesions  NERVOUS SYSTEM:  Alert & Oriented X0, sedated    LABS:  02-14    139  |  101  |  24<H>  ----------------------------<  122<H>  3.8   |  29  |  1.63<H>  02-13    133<L>  |  96<L>  |  27<H>  ----------------------------<  211<H>  4.5   |  28  |  1.48<H>  02-12    132<L>  |  95<L>  |  31<H>  ----------------------------<  219<H>  4.6   |  25  |  1.41<H>    Ca    8.2<L>      14 Feb 2024 01:50  Ca    7.9<L>      13 Feb 2024 01:43  Ca    8.0<L>      12 Feb 2024 02:40  Phos  4.1     02-14  Mg     2.00     02-14    TPro  6.4  /  Alb  2.3<L>  /  TBili  0.3  /  DBili  x   /  AST  22  /  ALT  21  /  AlkPhos  84  02-14  TPro  6.3  /  Alb  2.3<L>  /  TBili  0.3  /  DBili  x   /  AST  25  /  ALT  20  /  AlkPhos  101  02-13  TPro  6.2  /  Alb  2.2<L>  /  TBili  0.3  /  DBili  x   /  AST  31  /  ALT  23  /  AlkPhos  96  02-12      PT/INR - ( 14 Feb 2024 01:50 )   PT: 11.6 sec;   INR: 1.04 ratio         PTT - ( 14 Feb 2024 01:50 )  PTT:29.4 sec              Urinalysis Basic - ( 14 Feb 2024 01:50 )    Color: x / Appearance: x / SG: x / pH: x  Gluc: 122 mg/dL / Ketone: x  / Bili: x / Urobili: x   Blood: x / Protein: x / Nitrite: x   Leuk Esterase: x / RBC: x / WBC x   Sq Epi: x / Non Sq Epi: x / Bacteria: x                              7.2    9.07  )-----------( 379      ( 14 Feb 2024 01:50 )             22.9                         7.1    7.35  )-----------( 358      ( 13 Feb 2024 01:43 )             23.0                         7.5    9.59  )-----------( 377      ( 12 Feb 2024 02:40 )             24.3     CAPILLARY BLOOD GLUCOSE      POCT Blood Glucose.: 118 mg/dL (14 Feb 2024 05:29)  POCT Blood Glucose.: 163 mg/dL (13 Feb 2024 23:31)  POCT Blood Glucose.: 217 mg/dL (13 Feb 2024 18:04)  POCT Blood Glucose.: 217 mg/dL (13 Feb 2024 12:45)      RADIOLOGY & ADDITIONAL TESTS:    Imaging Personally Reviewed:  [x ] YES  [ ] NO    Consultants involved in case:   Consultant(s) Notes Reviewed:  [ x] YES  [ ] NO:   Care Discussed with Consultants/Other Providers [x ] YES  [ ] NO

## 2024-02-14 NOTE — PROGRESS NOTE ADULT - ASSESSMENT
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 1/29 GI reconsulted for consideration for peg, initially deferred pending optimization from respiratory standpoint. Pt extubated and since reintubated, now on pressors and planned for trach by IP per primary team- though now on hold pending MRI. GI reconsulted for peg.    # oropharyngeal dysphagia   - 1/29 seen by GI for consideration of PEG, deferred at that as pt not optimized from respiratory standpoint, though prior conversations had w/ family regarding risks of PEG placement, both related to sedation and placement, including, but not limited to infection, bleeding, injury to adjacent organs (or misplacement), leakage, peritonitis if pulled prior to stoma tract healing, buried bumper syndrome, continued aspiration, lack of safe window which would preclude placement, etc; 2/14 son at bedside, states PEG remains within GOC.   # acute hypoxemic respiratory failure/multifocal pna   - s/p trach  # shock on pressors - coming down on pressors  # encephalopathy   - most likely metabolic encephalopathy vs sz vs vascular etiology as per neuro  - pending MRI  # acute cholecystitis, sp IR perc asa 1/26  # prior UGIB/normocytic anemia - resolved, sp EGD - 2/2 Dieulafoy lesion s/p clipping and NGT trauma s/p clipping  # s/p SMA angiography with stent placement with diagnostic laparoscopy 12/24, currently only on asa, brillinta dcd on 1/24  # hx of CAD s/p CABG s/p stents (last stent May 2022)/new AF/mild to mod AS on echo  # hx of PPM (last interrogated 1/2024)      Recommendations:  - PEG tentatively planned for tomorrow  - continue NGT feeds in interim  - NPO midnight  - Hold Brilinta if acceptable risk  - rest of care as per MICU  - PPI 40mg BID    Thank you for involving us in the care of this patient. Please reach out if any further questions.    Ricardo Arriola  Gastroenterology    Available on [employer] Teams  After 5PM/Weekends, please contact the on-call GI fellow: [phone]

## 2024-02-14 NOTE — PROGRESS NOTE ADULT - SUBJECTIVE AND OBJECTIVE BOX
Interval Events:   - GI re-consulted for PEG placement  - Patient is s/p trach  - Off Brilinta  - Levophed requirements going down  - Afebrile    Allergies:  fluoroquinolone antibiotics (Other)  Cipro (Unknown)  Tegretol (Unknown)  carbamazepine (Other)        Hospital Medications:  acetaminophen   IVPB .. 1000 milliGRAM(s) IV Intermittent once  albuterol/ipratropium for Nebulization 3 milliLiter(s) Nebulizer every 6 hours  artificial  tears Solution 1 Drop(s) Left EYE four times a day  aspirin  chewable 81 milliGRAM(s) Enteral Tube daily  chlorhexidine 0.12% Liquid 15 milliLiter(s) Oral Mucosa every 12 hours  chlorhexidine 2% Cloths 1 Application(s) Topical daily  dextrose 5%. 1000 milliLiter(s) IV Continuous <Continuous>  dextrose 5%. 1000 milliLiter(s) IV Continuous <Continuous>  dextrose 50% Injectable 25 Gram(s) IV Push once  dextrose 50% Injectable 25 Gram(s) IV Push once  dextrose 50% Injectable 12.5 Gram(s) IV Push once  dextrose Oral Gel 15 Gram(s) Oral once PRN  doxazosin 2 milliGRAM(s) Oral at bedtime  escitalopram 10 milliGRAM(s) Oral daily  fentaNYL   Infusion. 0.5 MICROgram(s)/kG/Hr IV Continuous <Continuous>  glucagon  Injectable 1 milliGRAM(s) IntraMuscular once  insulin glargine Injectable (LANTUS) 12 Unit(s) SubCutaneous <User Schedule>  insulin lispro (ADMELOG) corrective regimen sliding scale   SubCutaneous every 6 hours  levETIRAcetam  IVPB 500 milliGRAM(s) IV Intermittent every 12 hours  midodrine 20 milliGRAM(s) Oral every 8 hours  mupirocin 2% Ointment 1 Application(s) Topical two times a day  norepinephrine Infusion 0.05 MICROgram(s)/kG/Min IV Continuous <Continuous>  pantoprazole  Injectable 40 milliGRAM(s) IV Push every 12 hours  petrolatum Ophthalmic Ointment 1 Application(s) Left EYE at bedtime  piperacillin/tazobactam IVPB.. 3.375 Gram(s) IV Intermittent every 8 hours  propofol Infusion 10.004 MICROgram(s)/kG/Min IV Continuous <Continuous>  sodium chloride 3%  Inhalation 4 milliLiter(s) Inhalation two times a day  sucralfate suspension 1 Gram(s) Enteral Tube two times a day  tranexamic acid Injectable for Topical Use 10 milliLiter(s) Topical once      PMHX/PSHX:  Hyperlipemia    Hypertension    Coronary Artery Disease    Diabetes Mellitus Type II    Stented Coronary Artery    Neuropathy    Myocardial infarction    Stroke    Myoclonic jerking    Stage 3 chronic kidney disease    History of Cataract Extraction    Hx of CABG    H/O coronary angiogram    S/P coronary artery stent placement    S/P placement of cardiac pacemaker        Family history:  No pertinent family history in first degree relatives        ROS: As per HPI, otherwise 14-point ROS reviewed and negative.      PHYSICAL EXAM:   Vital Signs:  Vital Signs Last 24 Hrs  T(C): 36.9 (2024 08:00), Max: 37 (2024 04:00)  T(F): 98.4 (2024 08:00), Max: 98.6 (2024 04:00)  HR: 90 (2024 15:26) (89 - 107)  BP: 129/45 (2024 14:00) (84/37 - 141/60)  BP(mean): 66 (2024 14:00) (48 - 82)  RR: 16 (2024 14:00) (0 - 23)  SpO2: 100% (2024 15:26) (95% - 100%)    Parameters below as of 2024 15:26  Patient On (Oxygen Delivery Method): ventilator      Daily     Daily Weight in k.5 (2024 05:00)      24 @ 07:  -  24 @ 07:00  --------------------------------------------------------  IN: 1294.9 mL / OUT: 2315 mL / NET: -1020.1 mL    24 @ 07:01  -  24 @ 16:06  --------------------------------------------------------  IN: 442 mL / OUT: 750 mL / NET: -308 mL        GENERAL: lying in bed, chronically ill appearing  HEENT: NCAT, +NGT  CHEST:  s/p trach  ABDOMEN:  soft, distended, appears non-tender, +scars to abd  EXTREMITIES: WWP  SKIN:  warm/dry  PSYCH: sedated  NEURO: no tremor noted       LABS:                        7.2    9.07  )-----------( 379      ( 2024 01:50 )             22.9     Mean Cell Volume: 94.2 fL (- @ 01:50)        139  |  101  |  22  ----------------------------<  133<H>  4.0   |  26  |  1.62<H>    Ca    8.4      2024 14:45  Phos  4.1       Mg     2.00         TPro  6.7  /  Alb  2.3<L>  /  TBili  0.4  /  DBili  x   /  AST  29  /  ALT  23  /  AlkPhos  81  -14    LIVER FUNCTIONS - ( 2024 14:45 )  Alb: 2.3 g/dL / Pro: 6.7 g/dL / ALK PHOS: 81 U/L / ALT: 23 U/L / AST: 29 U/L / GGT: x           PT/INR - ( 2024 01:50 )   PT: 11.6 sec;   INR: 1.04 ratio         PTT - ( 2024 01:50 )  PTT:29.4 sec  Urinalysis Basic - ( 2024 14:45 )    Color: x / Appearance: x / SG: x / pH: x  Gluc: 133 mg/dL / Ketone: x  / Bili: x / Urobili: x   Blood: x / Protein: x / Nitrite: x   Leuk Esterase: x / RBC: x / WBC x   Sq Epi: x / Non Sq Epi: x / Bacteria: x                              7.2    9.07  )-----------( 379      ( 2024 01:50 )             22.9                         7.1    7.35  )-----------( 358      ( 2024 01:43 )             23.0                         7.5    9.59  )-----------( 377      ( 2024 02:40 )             24.3                         7.8    8.35  )-----------( 369      ( 2024 23:11 )             24.1       Imaging:

## 2024-02-14 NOTE — PROGRESS NOTE ADULT - ATTENDING COMMENTS
89M with PMH of CABG DM2, CKD presenting to MICU after bronchoscopy to emergently remove mucous plugs obstructing mainstem bronchus (1/19) intubated on 1/22. Course since complicated by acute asa (s/p perc) and recent SMA stent. s/p tracheostomy placement ( 7 portex DCT) without issue. No signs of bleeding at trach site today. Secured with suture / ties. Can remove proline sutures in 12 days.           Rangel Kinsey MD  Interventional Pulmonology & Critical Care Medicine .

## 2024-02-14 NOTE — PROGRESS NOTE ADULT - ASSESSMENT
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 Course c/b acute cholecystitis s/p perc asa 1/26 by IR, also with recent SMA stent.   Patient failed trial of extubation now pending trach and peg    #prolonged ventilation  Patient underwent uneventful percutaneous tracheostomy with size 7 portex. See procedure notes for further details.       Post-tracheostomy care instructions:  -Minimize tension on tracheostomy: Keep tracheostomy elevated on towels to maintain perpendicular to neck and keep enough slack on vent tubing to avoid tension  -Sedate/Restrain patient to ensure does not pull at tracheostomy  -Keep tracheostomy retention collar in place at all times  -Utilize tracheostomy specific in-line suction or red rubber suction tubing through the swivel valve  -First dressing change at 72 hours. If dressings are grossly saturated before that time, reenforce dressings and page pulmonary/critical care fellow  2 sutures are to remain in place for 10 days, please do not remove before then. Sutures may be removed by the primary service. If feel there are issues with the sutures, please contact MICU/Pulmonary fellow  -First tracheostomy change to happen in 3 weeks. 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 Course c/b acute cholecystitis s/p perc asa 1/26 by IR, also with recent SMA stent.   Patient failed trial of extubation now pending trach and peg    #prolonged ventilation  Patient underwent uneventful percutaneous tracheostomy with size 7 portex 2/13/24. See procedure notes for further details.       Post-tracheostomy care instructions:  -Minimize tension on tracheostomy: Keep tracheostomy elevated on towels to maintain perpendicular to neck and keep enough slack on vent tubing to avoid tension  -Sedate/Restrain patient to ensure does not pull at tracheostomy  -Keep tracheostomy retention collar in place at all times  -Utilize tracheostomy specific in-line suction or red rubber suction tubing through the swivel valve  -First dressing change at 72 hours. If dressings are grossly saturated before that time, reenforce dressings and page pulmonary/critical care fellow  2 sutures are to remain in place for 10 days, please do not remove before then. Sutures may be removed by the primary service. If feel there are issues with the sutures, please contact MICU/Pulmonary fellow  -First tracheostomy change to happen in 3 weeks. If patient still admitted at that time please page pulmonary service to arrange exchange

## 2024-02-14 NOTE — PROGRESS NOTE ADULT - ASSESSMENT
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     EKG SR RBBB (old per family)     1) Hypoxia   - s/p bronch   - Rt pleural effusion   - held lasix given Hypernatremia and worsening creat  - intubated again , AMS    - f/u neuro recs MRI brain shows no acute disease  - for trach and peg     2) CAD s/p CABG  - p/w chest pain. EKG non ischemic , trop -sera   - chest pains  Trop mildly elevated and trending down likely sec to kidney disease,   - holding brilinta, was on it for SMA stent placed in 12/2023, held 2/2 anticipation for PEG placement, restart when no further invasive procedures planned  -TTE 2/12 with  mod decrease LV systolic function, new. euvolemic on exam, if oxygen requirements on vent increase consider diuretics. given current condition will medically manage    3) Covid +  - s/p remdesivir   - c/w supportive management   - t/t per primary team     4) Abd distension   -CTA AP obtained which showed  partially occlusive calcified and non-calcified plaque just distal to take-off of the SMA, with estimated at least 75% luminal narrowing. Limited evaluation of its distal branches. 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   -on Protonix IV q 12      6) Afib  -hx of PAF  -no AC 2/2 GIB  -resume metoprolol when weaned off pressors and able to tolerate       89M with history of CAD s/p CABG s/p stents (last stent May 2022), s/p PPM, DM2, CKD (baseline Cr 1.2-1.3 as per family), PVD, HTN, HLD, CVA x3 (without residual deficits), and 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     EKG SR RBBB (old per family)     1) Hypoxia   - s/p bronch   - Rt pleural effusion   - held lasix given Hypernatremia and worsening creat  - intubated again , AMS    - f/u neuro recs MRI brain shows no acute disease  - s/p trach   - peg     2) CAD s/p CABG  - p/w chest pain. EKG non ischemic , trop -sera   - chest pains  Trop mildly elevated and trending down likely sec to kidney disease,   - holding brilinta, was on it for SMA stent placed in 12/2023, held 2/2 anticipation for PEG placement, restart when no further invasive procedures planned  -TTE 2/12 with  mod decrease LV systolic function, new. euvolemic on exam, if oxygen requirements on vent increase consider diuretics. given current condition will medically manage    3) Covid +  - s/p remdesivir   - c/w supportive management   - t/t per primary team     4) Abd distension   -CTA AP obtained which showed  partially occlusive calcified and non-calcified plaque just distal to take-off of the SMA, with estimated at least 75% luminal narrowing. Limited evaluation of its distal branches. 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   -on Protonix IV q 12      6) Afib  -hx of PAF  -no AC 2/2 GIB  -resume metoprolol when weaned off pressors and able to tolerate

## 2024-02-14 NOTE — PROGRESS NOTE ADULT - SUBJECTIVE AND OBJECTIVE BOX
NEPHROLOGY     Patient seen and examined.    MEDICATIONS  (STANDING):  acetaminophen   IVPB .. 1000 milliGRAM(s) IV Intermittent once  albuterol/ipratropium for Nebulization 3 milliLiter(s) Nebulizer every 6 hours  artificial  tears Solution 1 Drop(s) Left EYE four times a day  aspirin  chewable 81 milliGRAM(s) Enteral Tube daily  chlorhexidine 0.12% Liquid 15 milliLiter(s) Oral Mucosa every 12 hours  chlorhexidine 2% Cloths 1 Application(s) Topical daily  dextrose 5%. 1000 milliLiter(s) (50 mL/Hr) IV Continuous <Continuous>  dextrose 5%. 1000 milliLiter(s) (100 mL/Hr) IV Continuous <Continuous>  dextrose 50% Injectable 25 Gram(s) IV Push once  dextrose 50% Injectable 12.5 Gram(s) IV Push once  dextrose 50% Injectable 25 Gram(s) IV Push once  doxazosin 2 milliGRAM(s) Oral at bedtime  escitalopram 10 milliGRAM(s) Oral daily  fentaNYL   Infusion. 0.5 MICROgram(s)/kG/Hr (3.97 mL/Hr) IV Continuous <Continuous>  glucagon  Injectable 1 milliGRAM(s) IntraMuscular once  insulin glargine Injectable (LANTUS) 12 Unit(s) SubCutaneous <User Schedule>  insulin lispro (ADMELOG) corrective regimen sliding scale   SubCutaneous every 6 hours  levETIRAcetam  IVPB 500 milliGRAM(s) IV Intermittent every 12 hours  midodrine 20 milliGRAM(s) Oral every 8 hours  mupirocin 2% Ointment 1 Application(s) Topical two times a day  norepinephrine Infusion 0.05 MICROgram(s)/kG/Min (3.72 mL/Hr) IV Continuous <Continuous>  pantoprazole  Injectable 40 milliGRAM(s) IV Push every 12 hours  petrolatum Ophthalmic Ointment 1 Application(s) Left EYE at bedtime  piperacillin/tazobactam IVPB.. 3.375 Gram(s) IV Intermittent every 8 hours  propofol Infusion 10.004 MICROgram(s)/kG/Min (4.76 mL/Hr) IV Continuous <Continuous>  sodium chloride 3%  Inhalation 4 milliLiter(s) Inhalation two times a day  sucralfate suspension 1 Gram(s) Enteral Tube two times a day  tranexamic acid Injectable for Topical Use 10 milliLiter(s) Topical once    VITALS:  T(C): , Max: 37 (02-14-24 @ 04:00)  T(F): , Max: 98.6 (02-14-24 @ 04:00)  HR: 96 (02-14-24 @ 12:00)  BP: 119/41 (02-14-24 @ 12:00)  BP(mean): 60 (02-14-24 @ 12:00)  RR: 12 (02-14-24 @ 12:00)  SpO2: 100% (02-14-24 @ 12:00)    I and O's:    02-13 @ 07:01  -  02-14 @ 07:00  --------------------------------------------------------  IN: 1294.9 mL / OUT: 2315 mL / NET: -1020.1 mL    02-14 @ 07:01  -  02-14 @ 13:32  --------------------------------------------------------  IN: 260 mL / OUT: 250 mL / NET: 10 mL    PHYSICAL EXAM:  Constitutional: NAD  Neck: No JVD  Respiratory: intubated  Cardiovascular: S1, S2, RRR  Gastrointestinal: BS+, soft, NT/ND  Extremities:+ peripheral edema  : +delong.   Skin: No rashes    LABS:                        7.2    9.07  )-----------( 379      ( 14 Feb 2024 01:50 )             22.9     02-14    139  |  101  |  24<H>  ----------------------------<  122<H>  3.8   |  29  |  1.63<H>    Ca    8.2<L>      14 Feb 2024 01:50  Phos  4.1     02-14  Mg     2.00     02-14    TPro  6.4  /  Alb  2.3<L>  /  TBili  0.3  /  DBili  x   /  AST  22  /  ALT  21  /  AlkPhos  84  02-14    RADIOLOGY & ADDITIONAL STUDIES:    rad< from: Xray Chest 1 View- PORTABLE-Urgent (Xray Chest 1 View- PORTABLE-Urgent .) (02.13.24 @ 23:49) >    COMPARISON: February 11      IMPRESSION:  *  Status post extubation.  *  Tracheostomy and NG tube placement.  *  Hazy lung bases likely effusions/atelectasis.    --- End of Report ---      AMELIA ANGLIN MD; Attending Radiologist  This document has been electronically signed. Feb 14 2024  6:14AM    < end of copied text >    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) who presents to the hospital for COVID19 infection and chest pain  MABLE - Renal fxn slightlyhigher   Hypokalemia -  resolved/stable   Hypernatremia - improved/stable   Hypertensive urgency -resolved -BP acceptable at present  Pulm - resp failure   s/p EEG  - eval noted  s/p trach           NEPHROLOGY     Patient seen and examined with family at bedside, vented, sedated, on levo gtt.     MEDICATIONS  (STANDING):  acetaminophen   IVPB .. 1000 milliGRAM(s) IV Intermittent once  albuterol/ipratropium for Nebulization 3 milliLiter(s) Nebulizer every 6 hours  artificial  tears Solution 1 Drop(s) Left EYE four times a day  aspirin  chewable 81 milliGRAM(s) Enteral Tube daily  chlorhexidine 0.12% Liquid 15 milliLiter(s) Oral Mucosa every 12 hours  chlorhexidine 2% Cloths 1 Application(s) Topical daily  dextrose 5%. 1000 milliLiter(s) (50 mL/Hr) IV Continuous <Continuous>  dextrose 5%. 1000 milliLiter(s) (100 mL/Hr) IV Continuous <Continuous>  dextrose 50% Injectable 25 Gram(s) IV Push once  dextrose 50% Injectable 12.5 Gram(s) IV Push once  dextrose 50% Injectable 25 Gram(s) IV Push once  doxazosin 2 milliGRAM(s) Oral at bedtime  escitalopram 10 milliGRAM(s) Oral daily  fentaNYL   Infusion. 0.5 MICROgram(s)/kG/Hr (3.97 mL/Hr) IV Continuous <Continuous>  glucagon  Injectable 1 milliGRAM(s) IntraMuscular once  insulin glargine Injectable (LANTUS) 12 Unit(s) SubCutaneous <User Schedule>  insulin lispro (ADMELOG) corrective regimen sliding scale   SubCutaneous every 6 hours  levETIRAcetam  IVPB 500 milliGRAM(s) IV Intermittent every 12 hours  midodrine 20 milliGRAM(s) Oral every 8 hours  mupirocin 2% Ointment 1 Application(s) Topical two times a day  norepinephrine Infusion 0.05 MICROgram(s)/kG/Min (3.72 mL/Hr) IV Continuous <Continuous>  pantoprazole  Injectable 40 milliGRAM(s) IV Push every 12 hours  petrolatum Ophthalmic Ointment 1 Application(s) Left EYE at bedtime  piperacillin/tazobactam IVPB.. 3.375 Gram(s) IV Intermittent every 8 hours  propofol Infusion 10.004 MICROgram(s)/kG/Min (4.76 mL/Hr) IV Continuous <Continuous>  sodium chloride 3%  Inhalation 4 milliLiter(s) Inhalation two times a day  sucralfate suspension 1 Gram(s) Enteral Tube two times a day  tranexamic acid Injectable for Topical Use 10 milliLiter(s) Topical once    VITALS:  T(C): , Max: 37 (02-14-24 @ 04:00)  T(F): , Max: 98.6 (02-14-24 @ 04:00)  HR: 96 (02-14-24 @ 12:00)  BP: 119/41 (02-14-24 @ 12:00)  BP(mean): 60 (02-14-24 @ 12:00)  RR: 12 (02-14-24 @ 12:00)  SpO2: 100% (02-14-24 @ 12:00)    I and O's:    02-13 @ 07:01  -  02-14 @ 07:00  --------------------------------------------------------  IN: 1294.9 mL / OUT: 2315 mL / NET: -1020.1 mL    02-14 @ 07:01  -  02-14 @ 13:32  --------------------------------------------------------  IN: 260 mL / OUT: 250 mL / NET: 10 mL    PHYSICAL EXAM:  Constitutional: NAD  HEENT: +trach   Respiratory: coarse bs   Cardiovascular: S1, S2, RRR  Gastrointestinal: BS+, soft, NT/ND  Extremities:+ peripheral edema  : +delong.   Skin: No rashes    LABS:                        7.2    9.07  )-----------( 379      ( 14 Feb 2024 01:50 )             22.9     02-14    139  |  101  |  24<H>  ----------------------------<  122<H>  3.8   |  29  |  1.63<H>    Ca    8.2<L>      14 Feb 2024 01:50  Phos  4.1     02-14  Mg     2.00     02-14    TPro  6.4  /  Alb  2.3<L>  /  TBili  0.3  /  DBili  x   /  AST  22  /  ALT  21  /  AlkPhos  84  02-14    RADIOLOGY & ADDITIONAL STUDIES:    rad< from: Xray Chest 1 View- PORTABLE-Urgent (Xray Chest 1 View- PORTABLE-Urgent .) (02.13.24 @ 23:49) >    COMPARISON: February 11      IMPRESSION:  *  Status post extubation.  *  Tracheostomy and NG tube placement.  *  Hazy lung bases likely effusions/atelectasis.    --- End of Report ---      AMELIA ANGLIN MD; Attending Radiologist  This document has been electronically signed. Feb 14 2024  6:14AM    < end of copied text >

## 2024-02-14 NOTE — PROGRESS NOTE ADULT - SUBJECTIVE AND OBJECTIVE BOX
CHIEF COMPLAINT:Patient is a 89y old  Male who presents with a chief complaint of COVID19, Chest pain (14 Feb 2024 16:06)      Interval Events:  s/p tracheostomy 2/13/24  site c/d/i  continues w psv trials    REVIEW OF SYSTEMS:  [x] All other systems negative except per HPI   [ ] Unable to assess ROS because ________    OBJECTIVE:  ICU Vital Signs Last 24 Hrs  T(C): 36.9 (14 Feb 2024 08:00), Max: 37 (14 Feb 2024 04:00)  T(F): 98.4 (14 Feb 2024 08:00), Max: 98.6 (14 Feb 2024 04:00)  HR: 90 (14 Feb 2024 15:26) (89 - 107)  BP: 122/44 (14 Feb 2024 15:00) (97/37 - 141/60)  BP(mean): 63 (14 Feb 2024 15:00) (52 - 79)  ABP: --  ABP(mean): --  RR: 20 (14 Feb 2024 15:00) (12 - 20)  SpO2: 100% (14 Feb 2024 15:26) (95% - 100%)    O2 Parameters below as of 14 Feb 2024 15:26  Patient On (Oxygen Delivery Method): ventilator          Mode: AC/ CMV (Assist Control/ Continuous Mandatory Ventilation), RR (machine): 12, TV (machine): 450, FiO2: 30, PEEP: 5, ITime: 0.72, MAP: 14, PIP: 35    02-13 @ 07:01 - 02-14 @ 07:00  --------------------------------------------------------  IN: 1294.9 mL / OUT: 2315 mL / NET: -1020.1 mL    02-14 @ 07:01 - 02-14 @ 17:06  --------------------------------------------------------  IN: 592 mL / OUT: 1400 mL / NET: -808 mL        PHYSICAL EXAM:  GENERAL: NAD  HEAD:  Atraumatic, Normocephalic  EYES: EOMI, conjunctiva and sclera clear  ENT: tracheostomy tube in place, site c/d/i  CHEST/LUNG: mechanical bs  HEART: Regular rate and rhythm; No murmurs, rubs, or gallops  ABDOMEN: Soft, Nontender, Nondistended;   SKIN: No rashes or lesions  NERVOUS SYSTEM:  sedated    HOSPITAL MEDICATIONS:  MEDICATIONS  (STANDING):  acetaminophen   IVPB .. 1000 milliGRAM(s) IV Intermittent once  albuterol/ipratropium for Nebulization 3 milliLiter(s) Nebulizer every 6 hours  artificial  tears Solution 1 Drop(s) Left EYE four times a day  aspirin  chewable 81 milliGRAM(s) Enteral Tube daily  chlorhexidine 0.12% Liquid 15 milliLiter(s) Oral Mucosa every 12 hours  chlorhexidine 2% Cloths 1 Application(s) Topical daily  dextrose 5%. 1000 milliLiter(s) (50 mL/Hr) IV Continuous <Continuous>  dextrose 5%. 1000 milliLiter(s) (100 mL/Hr) IV Continuous <Continuous>  dextrose 50% Injectable 25 Gram(s) IV Push once  dextrose 50% Injectable 25 Gram(s) IV Push once  dextrose 50% Injectable 12.5 Gram(s) IV Push once  doxazosin 2 milliGRAM(s) Oral at bedtime  escitalopram 10 milliGRAM(s) Oral daily  fentaNYL   Infusion. 0.5 MICROgram(s)/kG/Hr (3.97 mL/Hr) IV Continuous <Continuous>  glucagon  Injectable 1 milliGRAM(s) IntraMuscular once  insulin glargine Injectable (LANTUS) 12 Unit(s) SubCutaneous <User Schedule>  insulin lispro (ADMELOG) corrective regimen sliding scale   SubCutaneous every 6 hours  levETIRAcetam  IVPB 500 milliGRAM(s) IV Intermittent every 12 hours  midodrine 20 milliGRAM(s) Oral every 8 hours  mupirocin 2% Ointment 1 Application(s) Topical two times a day  norepinephrine Infusion 0.05 MICROgram(s)/kG/Min (3.72 mL/Hr) IV Continuous <Continuous>  pantoprazole  Injectable 40 milliGRAM(s) IV Push every 12 hours  petrolatum Ophthalmic Ointment 1 Application(s) Left EYE at bedtime  piperacillin/tazobactam IVPB.. 3.375 Gram(s) IV Intermittent every 8 hours  propofol Infusion 10.004 MICROgram(s)/kG/Min (4.76 mL/Hr) IV Continuous <Continuous>  sodium chloride 3%  Inhalation 4 milliLiter(s) Inhalation two times a day  sucralfate suspension 1 Gram(s) Enteral Tube two times a day  tranexamic acid Injectable for Topical Use 10 milliLiter(s) Topical once    MEDICATIONS  (PRN):  dextrose Oral Gel 15 Gram(s) Oral once PRN Blood Glucose LESS THAN 70 milliGRAM(s)/deciliter      LABS:    The Labs were reviewed by me   The Radiology was reviewed by me    EKG tracing reviewed by me    02-14    139  |  101  |  22  ----------------------------<  133<H>  4.0   |  26  |  1.62<H>  02-14    139  |  101  |  24<H>  ----------------------------<  122<H>  3.8   |  29  |  1.63<H>  02-13    133<L>  |  96<L>  |  27<H>  ----------------------------<  211<H>  4.5   |  28  |  1.48<H>    Ca    8.4      14 Feb 2024 14:45  Ca    8.2<L>      14 Feb 2024 01:50  Ca    7.9<L>      13 Feb 2024 01:43  Phos  4.1     02-14  Mg     2.00     02-14    TPro  6.7  /  Alb  2.3<L>  /  TBili  0.4  /  DBili  x   /  AST  29  /  ALT  23  /  AlkPhos  81  02-14  TPro  6.4  /  Alb  2.3<L>  /  TBili  0.3  /  DBili  x   /  AST  22  /  ALT  21  /  AlkPhos  84  02-14  TPro  6.3  /  Alb  2.3<L>  /  TBili  0.3  /  DBili  x   /  AST  25  /  ALT  20  /  AlkPhos  101  02-13    Magnesium: 2.00 mg/dL (02-14-24 @ 01:50)  Magnesium: 2.10 mg/dL (02-13-24 @ 01:43)  Magnesium: 2.10 mg/dL (02-12-24 @ 02:40)  Magnesium: 2.00 mg/dL (02-11-24 @ 23:11)    Phosphorus: 4.1 mg/dL (02-14-24 @ 01:50)  Phosphorus: 3.5 mg/dL (02-13-24 @ 01:43)  Phosphorus: 2.9 mg/dL (02-12-24 @ 02:40)  Phosphorus: 2.9 mg/dL (02-11-24 @ 23:11)      PT/INR - ( 14 Feb 2024 01:50 )   PT: 11.6 sec;   INR: 1.04 ratio         PTT - ( 14 Feb 2024 01:50 )  PTT:29.4 sec              Urinalysis Basic - ( 14 Feb 2024 14:45 )    Color: x / Appearance: x / SG: x / pH: x  Gluc: 133 mg/dL / Ketone: x  / Bili: x / Urobili: x   Blood: x / Protein: x / Nitrite: x   Leuk Esterase: x / RBC: x / WBC x   Sq Epi: x / Non Sq Epi: x / Bacteria: x                              7.2    9.07  )-----------( 379      ( 14 Feb 2024 01:50 )             22.9                         7.1    7.35  )-----------( 358      ( 13 Feb 2024 01:43 )             23.0                         7.5    9.59  )-----------( 377      ( 12 Feb 2024 02:40 )             24.3     CAPILLARY BLOOD GLUCOSE      POCT Blood Glucose.: 130 mg/dL (14 Feb 2024 11:53)  POCT Blood Glucose.: 118 mg/dL (14 Feb 2024 05:29)  POCT Blood Glucose.: 163 mg/dL (13 Feb 2024 23:31)  POCT Blood Glucose.: 217 mg/dL (13 Feb 2024 18:04)    Blood Gas Source Venous: Venous (02-12-24 @ 02:40)      MICROBIOLOGY:     RADIOLOGY:  [ ] Reviewed and interpreted by me    Point of Care Ultrasound Findings:    PFT:    EKG:

## 2024-02-14 NOTE — PHYSICAL THERAPY INITIAL EVALUATION ADULT - REHAB POTENTIAL, PT EVAL
Detail Level: Detailed Quality 47: Advance Care Plan: Advance Care Planning discussed and documented in the medical record; patient did not wish or was not able to name a surrogate decision maker or provide an advance care plan. Quality 431: Preventive Care And Screening: Unhealthy Alcohol Use - Screening: Patient not identified as an unhealthy alcohol user when screened for unhealthy alcohol use using a systematic screening method Quality 130: Documentation Of Current Medications In The Medical Record: Current Medications Documented Quality 110: Preventive Care And Screening: Influenza Immunization: Influenza Immunization previously received during influenza season Quality 226: Preventive Care And Screening: Tobacco Use: Screening And Cessation Intervention: Patient screened for tobacco use and is an ex/non-smoker Additional Notes: *** Meds documented to the best of my ability with the resources available good, to achieve stated therapy goals

## 2024-02-14 NOTE — PROGRESS NOTE ADULT - ASSESSMENT
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) who presents to the hospital for COVID19 infection and chest pain    (1)MABLE - Renal fxn slightly higher   (2)Lytes acceptable   (3)CV - Hypertensive urgency -resolved -BP acceptable at present  (4)Pulm - resp failure; s/p trach yesterday   (5)GI - s/p EGD, PEG pending   (6)ID - on IV Zosyn     RECOMMEND:   (1)No objection to Lasix 20 mg IV PRN  (2)Vent/pressors per ICU   (3)BMP+Mg+Phos daily     Faby Cummins DNP  [facility]  [phone]      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 Agree with assessment and plan as above.    Davey Chacko MD  [facility]  [phone]

## 2024-02-15 LAB
ALBUMIN SERPL ELPH-MCNC: 2.1 G/DL — LOW (ref 3.3–5)
ALP SERPL-CCNC: 83 U/L — SIGNIFICANT CHANGE UP (ref 40–120)
ALT FLD-CCNC: 21 U/L — SIGNIFICANT CHANGE UP (ref 4–41)
ANION GAP SERPL CALC-SCNC: 14 MMOL/L — SIGNIFICANT CHANGE UP (ref 7–14)
APTT BLD: 29.9 SEC — SIGNIFICANT CHANGE UP (ref 24.5–35.6)
AST SERPL-CCNC: 33 U/L — SIGNIFICANT CHANGE UP (ref 4–40)
BILIRUB SERPL-MCNC: 0.3 MG/DL — SIGNIFICANT CHANGE UP (ref 0.2–1.2)
BLD GP AB SCN SERPL QL: NEGATIVE — SIGNIFICANT CHANGE UP
BLOOD GAS ARTERIAL COMPREHENSIVE RESULT: SIGNIFICANT CHANGE UP
BUN SERPL-MCNC: 23 MG/DL — SIGNIFICANT CHANGE UP (ref 7–23)
CALCIUM SERPL-MCNC: 8 MG/DL — LOW (ref 8.4–10.5)
CHLORIDE SERPL-SCNC: 99 MMOL/L — SIGNIFICANT CHANGE UP (ref 98–107)
CO2 SERPL-SCNC: 26 MMOL/L — SIGNIFICANT CHANGE UP (ref 22–31)
CREAT SERPL-MCNC: 1.65 MG/DL — HIGH (ref 0.5–1.3)
CULTURE RESULTS: SIGNIFICANT CHANGE UP
EGFR: 39 ML/MIN/1.73M2 — LOW
GLUCOSE BLDC GLUCOMTR-MCNC: 149 MG/DL — HIGH (ref 70–99)
GLUCOSE BLDC GLUCOMTR-MCNC: 173 MG/DL — HIGH (ref 70–99)
GLUCOSE BLDC GLUCOMTR-MCNC: 204 MG/DL — HIGH (ref 70–99)
GLUCOSE BLDC GLUCOMTR-MCNC: 227 MG/DL — HIGH (ref 70–99)
GLUCOSE SERPL-MCNC: 212 MG/DL — HIGH (ref 70–99)
HCT VFR BLD CALC: 22 % — LOW (ref 39–50)
HCT VFR BLD CALC: 22.2 % — LOW (ref 39–50)
HCT VFR BLD CALC: 26.7 % — LOW (ref 39–50)
HGB BLD-MCNC: 6.8 G/DL — CRITICAL LOW (ref 13–17)
HGB BLD-MCNC: 6.9 G/DL — CRITICAL LOW (ref 13–17)
HGB BLD-MCNC: 8.6 G/DL — LOW (ref 13–17)
INR BLD: 1.12 RATIO — SIGNIFICANT CHANGE UP (ref 0.85–1.18)
MAGNESIUM SERPL-MCNC: 1.8 MG/DL — SIGNIFICANT CHANGE UP (ref 1.6–2.6)
MCHC RBC-ENTMCNC: 28.9 PG — SIGNIFICANT CHANGE UP (ref 27–34)
MCHC RBC-ENTMCNC: 29.1 PG — SIGNIFICANT CHANGE UP (ref 27–34)
MCHC RBC-ENTMCNC: 29.1 PG — SIGNIFICANT CHANGE UP (ref 27–34)
MCHC RBC-ENTMCNC: 30.6 GM/DL — LOW (ref 32–36)
MCHC RBC-ENTMCNC: 31.4 GM/DL — LOW (ref 32–36)
MCHC RBC-ENTMCNC: 32.2 GM/DL — SIGNIFICANT CHANGE UP (ref 32–36)
MCV RBC AUTO: 90.2 FL — SIGNIFICANT CHANGE UP (ref 80–100)
MCV RBC AUTO: 92.8 FL — SIGNIFICANT CHANGE UP (ref 80–100)
MCV RBC AUTO: 94.5 FL — SIGNIFICANT CHANGE UP (ref 80–100)
NRBC # BLD: 0 /100 WBCS — SIGNIFICANT CHANGE UP (ref 0–0)
NRBC # FLD: 0 K/UL — SIGNIFICANT CHANGE UP (ref 0–0)
PHOSPHATE SERPL-MCNC: 3.2 MG/DL — SIGNIFICANT CHANGE UP (ref 2.5–4.5)
PLATELET # BLD AUTO: 327 K/UL — SIGNIFICANT CHANGE UP (ref 150–400)
PLATELET # BLD AUTO: 356 K/UL — SIGNIFICANT CHANGE UP (ref 150–400)
PLATELET # BLD AUTO: 367 K/UL — SIGNIFICANT CHANGE UP (ref 150–400)
POTASSIUM SERPL-MCNC: 3.6 MMOL/L — SIGNIFICANT CHANGE UP (ref 3.5–5.3)
POTASSIUM SERPL-SCNC: 3.6 MMOL/L — SIGNIFICANT CHANGE UP (ref 3.5–5.3)
PROT SERPL-MCNC: 6.1 G/DL — SIGNIFICANT CHANGE UP (ref 6–8.3)
PROTHROM AB SERPL-ACNC: 12.5 SEC — SIGNIFICANT CHANGE UP (ref 9.5–13)
RBC # BLD: 2.35 M/UL — LOW (ref 4.2–5.8)
RBC # BLD: 2.37 M/UL — LOW (ref 4.2–5.8)
RBC # BLD: 2.96 M/UL — LOW (ref 4.2–5.8)
RBC # FLD: 17.6 % — HIGH (ref 10.3–14.5)
RBC # FLD: 17.7 % — HIGH (ref 10.3–14.5)
RBC # FLD: 18 % — HIGH (ref 10.3–14.5)
RH IG SCN BLD-IMP: POSITIVE — SIGNIFICANT CHANGE UP
SODIUM SERPL-SCNC: 139 MMOL/L — SIGNIFICANT CHANGE UP (ref 135–145)
SPECIMEN SOURCE: SIGNIFICANT CHANGE UP
WBC # BLD: 8.41 K/UL — SIGNIFICANT CHANGE UP (ref 3.8–10.5)
WBC # BLD: 8.56 K/UL — SIGNIFICANT CHANGE UP (ref 3.8–10.5)
WBC # BLD: 9.09 K/UL — SIGNIFICANT CHANGE UP (ref 3.8–10.5)
WBC # FLD AUTO: 8.41 K/UL — SIGNIFICANT CHANGE UP (ref 3.8–10.5)
WBC # FLD AUTO: 8.56 K/UL — SIGNIFICANT CHANGE UP (ref 3.8–10.5)
WBC # FLD AUTO: 9.09 K/UL — SIGNIFICANT CHANGE UP (ref 3.8–10.5)

## 2024-02-15 PROCEDURE — 99291 CRITICAL CARE FIRST HOUR: CPT

## 2024-02-15 PROCEDURE — 99232 SBSQ HOSP IP/OBS MODERATE 35: CPT | Mod: FS

## 2024-02-15 PROCEDURE — 99232 SBSQ HOSP IP/OBS MODERATE 35: CPT

## 2024-02-15 RX ORDER — POTASSIUM CHLORIDE 20 MEQ
10 PACKET (EA) ORAL
Refills: 0 | Status: COMPLETED | OUTPATIENT
Start: 2024-02-15 | End: 2024-02-15

## 2024-02-15 RX ORDER — ACETAMINOPHEN 500 MG
1000 TABLET ORAL ONCE
Refills: 0 | Status: COMPLETED | OUTPATIENT
Start: 2024-02-15 | End: 2024-02-15

## 2024-02-15 RX ORDER — FUROSEMIDE 40 MG
40 TABLET ORAL ONCE
Refills: 0 | Status: COMPLETED | OUTPATIENT
Start: 2024-02-15 | End: 2024-02-15

## 2024-02-15 RX ORDER — MAGNESIUM SULFATE 500 MG/ML
1 VIAL (ML) INJECTION ONCE
Refills: 0 | Status: COMPLETED | OUTPATIENT
Start: 2024-02-15 | End: 2024-02-15

## 2024-02-15 RX ORDER — FUROSEMIDE 40 MG
40 TABLET ORAL ONCE
Refills: 0 | Status: DISCONTINUED | OUTPATIENT
Start: 2024-02-15 | End: 2024-02-15

## 2024-02-15 RX ORDER — HEPARIN SODIUM 5000 [USP'U]/ML
5000 INJECTION INTRAVENOUS; SUBCUTANEOUS EVERY 8 HOURS
Refills: 0 | Status: DISCONTINUED | OUTPATIENT
Start: 2024-02-15 | End: 2024-02-15

## 2024-02-15 RX ORDER — MIDODRINE HYDROCHLORIDE 2.5 MG/1
30 TABLET ORAL EVERY 8 HOURS
Refills: 0 | Status: DISCONTINUED | OUTPATIENT
Start: 2024-02-15 | End: 2024-02-19

## 2024-02-15 RX ORDER — MIDODRINE HYDROCHLORIDE 2.5 MG/1
30 TABLET ORAL EVERY 8 HOURS
Refills: 0 | Status: DISCONTINUED | OUTPATIENT
Start: 2024-02-15 | End: 2024-02-15

## 2024-02-15 RX ADMIN — PANTOPRAZOLE SODIUM 40 MILLIGRAM(S): 20 TABLET, DELAYED RELEASE ORAL at 05:27

## 2024-02-15 RX ADMIN — PANTOPRAZOLE SODIUM 40 MILLIGRAM(S): 20 TABLET, DELAYED RELEASE ORAL at 17:18

## 2024-02-15 RX ADMIN — LEVETIRACETAM 400 MILLIGRAM(S): 250 TABLET, FILM COATED ORAL at 17:16

## 2024-02-15 RX ADMIN — PIPERACILLIN AND TAZOBACTAM 25 GRAM(S): 4; .5 INJECTION, POWDER, LYOPHILIZED, FOR SOLUTION INTRAVENOUS at 17:19

## 2024-02-15 RX ADMIN — Medication 100 MILLIEQUIVALENT(S): at 12:21

## 2024-02-15 RX ADMIN — MIDODRINE HYDROCHLORIDE 20 MILLIGRAM(S): 2.5 TABLET ORAL at 05:28

## 2024-02-15 RX ADMIN — Medication 1 DROP(S): at 00:07

## 2024-02-15 RX ADMIN — Medication 1 GRAM(S): at 05:28

## 2024-02-15 RX ADMIN — Medication 4: at 00:06

## 2024-02-15 RX ADMIN — MIDODRINE HYDROCHLORIDE 30 MILLIGRAM(S): 2.5 TABLET ORAL at 21:30

## 2024-02-15 RX ADMIN — Medication 400 MILLIGRAM(S): at 03:37

## 2024-02-15 RX ADMIN — MUPIROCIN 1 APPLICATION(S): 20 OINTMENT TOPICAL at 05:29

## 2024-02-15 RX ADMIN — CHLORHEXIDINE GLUCONATE 1 APPLICATION(S): 213 SOLUTION TOPICAL at 11:21

## 2024-02-15 RX ADMIN — Medication 2 MILLIGRAM(S): at 21:30

## 2024-02-15 RX ADMIN — LEVETIRACETAM 400 MILLIGRAM(S): 250 TABLET, FILM COATED ORAL at 05:28

## 2024-02-15 RX ADMIN — Medication 4: at 23:20

## 2024-02-15 RX ADMIN — Medication 1 APPLICATION(S): at 21:31

## 2024-02-15 RX ADMIN — INSULIN GLARGINE 12 UNIT(S): 100 INJECTION, SOLUTION SUBCUTANEOUS at 12:15

## 2024-02-15 RX ADMIN — CHLORHEXIDINE GLUCONATE 15 MILLILITER(S): 213 SOLUTION TOPICAL at 05:27

## 2024-02-15 RX ADMIN — Medication 3 MILLILITER(S): at 15:37

## 2024-02-15 RX ADMIN — Medication 100 MILLIEQUIVALENT(S): at 11:19

## 2024-02-15 RX ADMIN — Medication 1 DROP(S): at 05:29

## 2024-02-15 RX ADMIN — Medication 81 MILLIGRAM(S): at 11:23

## 2024-02-15 RX ADMIN — Medication 3 MILLILITER(S): at 03:33

## 2024-02-15 RX ADMIN — PROPOFOL 4.76 MICROGRAM(S)/KG/MIN: 10 INJECTION, EMULSION INTRAVENOUS at 07:36

## 2024-02-15 RX ADMIN — PIPERACILLIN AND TAZOBACTAM 25 GRAM(S): 4; .5 INJECTION, POWDER, LYOPHILIZED, FOR SOLUTION INTRAVENOUS at 01:15

## 2024-02-15 RX ADMIN — Medication 1 GRAM(S): at 17:18

## 2024-02-15 RX ADMIN — Medication 100 MILLIEQUIVALENT(S): at 10:18

## 2024-02-15 RX ADMIN — HEPARIN SODIUM 5000 UNIT(S): 5000 INJECTION INTRAVENOUS; SUBCUTANEOUS at 14:31

## 2024-02-15 RX ADMIN — MIDODRINE HYDROCHLORIDE 30 MILLIGRAM(S): 2.5 TABLET ORAL at 14:31

## 2024-02-15 RX ADMIN — ESCITALOPRAM OXALATE 10 MILLIGRAM(S): 10 TABLET, FILM COATED ORAL at 11:22

## 2024-02-15 RX ADMIN — Medication 1 DROP(S): at 17:18

## 2024-02-15 RX ADMIN — Medication 3 MILLILITER(S): at 22:14

## 2024-02-15 RX ADMIN — CHLORHEXIDINE GLUCONATE 15 MILLILITER(S): 213 SOLUTION TOPICAL at 17:18

## 2024-02-15 RX ADMIN — Medication 1 DROP(S): at 11:20

## 2024-02-15 RX ADMIN — Medication 3.72 MICROGRAM(S)/KG/MIN: at 07:35

## 2024-02-15 RX ADMIN — Medication 4: at 05:29

## 2024-02-15 RX ADMIN — Medication 3 MILLILITER(S): at 09:09

## 2024-02-15 RX ADMIN — SODIUM CHLORIDE 4 MILLILITER(S): 9 INJECTION INTRAMUSCULAR; INTRAVENOUS; SUBCUTANEOUS at 09:09

## 2024-02-15 RX ADMIN — Medication 2: at 17:19

## 2024-02-15 RX ADMIN — PIPERACILLIN AND TAZOBACTAM 25 GRAM(S): 4; .5 INJECTION, POWDER, LYOPHILIZED, FOR SOLUTION INTRAVENOUS at 09:22

## 2024-02-15 RX ADMIN — MUPIROCIN 1 APPLICATION(S): 20 OINTMENT TOPICAL at 17:19

## 2024-02-15 RX ADMIN — SODIUM CHLORIDE 4 MILLILITER(S): 9 INJECTION INTRAMUSCULAR; INTRAVENOUS; SUBCUTANEOUS at 22:15

## 2024-02-15 RX ADMIN — Medication 100 GRAM(S): at 09:18

## 2024-02-15 RX ADMIN — Medication 40 MILLIGRAM(S): at 12:49

## 2024-02-15 RX ADMIN — PROPOFOL 4.76 MICROGRAM(S)/KG/MIN: 10 INJECTION, EMULSION INTRAVENOUS at 05:28

## 2024-02-15 NOTE — PROGRESS NOTE ADULT - ATTENDING COMMENTS
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 Course c/b acute cholecystitis s/p perc asa 1/26 by IR, also with recent SMA stent.   Patient failed trial of extubation 2/2 ?aspiration, ?mucus plugging iso AMS 2/2 toxic metabolic encephalopathy.     Currently s/p tracheostomy 2/13/24.    N: Myoclonus, ?Seizure do  Toxic metabolic encephalopathy  MRI negative for acute pathology  Petechial Hemorrhages   - Daily sedation vacation  - Trial to wean off sedation  - Lexapro and Keppra  - Optho for hemorrhages  CV: Sedation related hypotension  Shock: Cardiogenic v. distributive v. vasoplegic   AF with RVR  CAD s/p CABG and PCI (2022)   - Midodrine increase to 30 q 8 hours  - Levo with MAP goal >65  - ASA for CAD, holding Brillinta (plan to resume post procedures)  P Acute hypoxemic/hypercapnic respiratory failure requiring intubation mechanical ventilation  Multifocal Pneumonia  MSSA pneumonia  Pleural effusions  Aspiration   COVID  - Lung protective ventilation  - Daily PSV, tolerating 5/5, 30%  - Duonebs q 6 hours, and airway clearance: inhaled hypertonic saline q 12  - Goal net negative to even  GI: Acute cholecystitis s/p biliary tube  SMA stent   - Planned for PEG in the coming days; required to be AF for 24 hours  - TF resumed  Renal:   - Trend creatinine, trending   - Replete lytes as needed  - Diuresis as tolerated, goal net neg  ID: Acute asa s/p perc asa  Suspect non infectious fevers, low grade and no localizing symptoms  - Infectious work up negative thus far, will follow up pending studies  - Zosyn - planned for 7 days  Heme: Anemia of chronic disease  Endo:   - Avoid hypoglycemia, adjust Insulin while NPO    DVT: resume based on procedural plan  GI: PPI 40 BID iso GIB    Full code  Prognosis guarded. Updated family at bedside

## 2024-02-15 NOTE — PROGRESS NOTE ADULT - ASSESSMENT
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 1/29 GI reconsulted for consideration for peg, initially deferred pending optimization from respiratory standpoint. Pt extubated and since reintubated, on pressors, sp trach by IP on 2/13. GI reconsulted for peg as remains within goc.     # oropharyngeal dysphagia   - 1/29 seen by GI for consideration of PEG, deferred at that as pt not optimized from respiratory standpoint, though prior conversations had w/ family regarding risks of PEG placement, both related to sedation and placement, including, but not limited to infection, bleeding, injury to adjacent organs (or misplacement), leakage, peritonitis if pulled prior to stoma tract healing, buried bumper syndrome, continued aspiration, lack of safe window which would preclude placement, etc  - 2/14 son at bedside, states PEG remains within GOC   # acute hypoxemic respiratory failure/multifocal pna   - s/p trach  # shock on pressors   # encephalopathy   - MRI neg for acute pathology  # acute cholecystitis, sp IR perc asa 1/26  # prior UGIB/normocytic anemia   - sp prior EGD - 2/2 Dieulafoy lesion s/p clipping and NGT trauma s/p clipping  # s/p SMA angiography with stent placement with diagnostic laparoscopy 12/24, currently only on asa, brillinta dcd on 1/24  # hx of CAD s/p CABG s/p stents (last stent May 2022)/new AF/mild to mod AS on echo  # hx of PPM (last interrogated 1/2024)      Recommendations:  - follow up post transfusion cbc  - given low grade fever will hold off on PEG placement today, plan for placement when afebrile x 48hrs (if fever thought to be non infectious in origin pls have ID document clearance to proceed)  - continue NGT feeds in interim  - Brilinta on hold if acceptable risk  - PPI 40mg BID  - rest of care as per MICU; [facility] resident    [facility] att[facility]    HUBER Whitley PA-C, RD, CDN  available on TEAMS for questions  after 5pm/weekends, please contact the on-call GI fellow at [phone]

## 2024-02-15 NOTE — PROGRESS NOTE ADULT - SUBJECTIVE AND OBJECTIVE BOX
Remains on vent/remains sedated on Propofol  Levophed gtt being weaned down/on Midodrine    VITAL:  T(C): , Max: 38 (02-15-24 @ 03:00)  T(F): , Max: 100.4 (02-15-24 @ 03:00)  HR: 102 (02-15-24 @ 16:00)  BP: 117/48 (02-15-24 @ 16:00)  BP(mean): 65 (02-15-24 @ 16:00)  RR: 18 (02-15-24 @ 16:00)  SpO2: 99% (02-15-24 @ 16:00)      PHYSICAL EXAM:  Constitutional: NAD  HEENT: +trach   Respiratory: coarse bs   Cardiovascular: tachy s1s2  Gastrointestinal: BS+, soft, NT/ND  Extremities:+ peripheral edema  : +delong.   Skin: No rashes    LABS:                        8.6    9.09  )-----------( 327      ( 15 Feb 2024 10:00 )             26.7     Na(139)/K(3.6)/Cl(99)/HCO3(26)/BUN(23)/Cr(1.65)Glu(212)/Ca(8.0)/Mg(1.80)/PO4(3.2)    02-15 @ 00:54  Na(139)/K(4.0)/Cl(101)/HCO3(26)/BUN(22)/Cr(1.62)Glu(133)/Ca(8.4)/Mg(--)/PO4(--)    02-14 @ 14:45  Na(139)/K(3.8)/Cl(101)/HCO3(29)/BUN(24)/Cr(1.63)Glu(122)/Ca(8.2)/Mg(2.00)/PO4(4.1)    02-14 @ 01:50  Na(133)/K(4.5)/Cl(96)/HCO3(28)/BUN(27)/Cr(1.48)Glu(211)/Ca(7.9)/Mg(2.10)/PO4(3.5)    02-13 @ 01:43      IMPRESSION:  89M w/ DM2, HTN, CVA, PAD, CKD3, and CAD-CABG, 12/23/23 p/w COVID19 infection, c/b respiratory failure/hypotension; now s/p tracheostomy    (1)Renal - CKD3; superimposed mild prerenal azotemia - numbers fluctuating based on hemodynamic status  (2)Lytes - acceptable   (3)CV - pressor-dependent; pressor gtts being weaned down  (4)Pulm - vent-dependent   (5ID - still febrile; on IV Zosyn  (6)GI - needing to be febrile for 24h prior to PEG    RECOMMEND:   (1)No objection to Lasix 20 mg IV PRN  (2)Continue abx for GFR 30-40ml/min      Davey Chacko MD  [facility]  Office/on call physician: [phone]  Cell (7a-7s): [phone]       Remains on vent/remains sedated on Propofol  Levophed gtt being weaned down/on Midodrine    VITAL:  T(C): , Max: 38 (02-15-24 @ 03:00)  T(F): , Max: 100.4 (02-15-24 @ 03:00)  HR: 102 (02-15-24 @ 16:00)  BP: 117/48 (02-15-24 @ 16:00)  BP(mean): 65 (02-15-24 @ 16:00)  RR: 18 (02-15-24 @ 16:00)  SpO2: 99% (02-15-24 @ 16:00)  Urine output 2665cc/24h    PHYSICAL EXAM:  Constitutional: NAD  HEENT: +trach   Respiratory: coarse bs   Cardiovascular: tachy s1s2  Gastrointestinal: BS+, soft, NT/ND  Extremities:+ peripheral edema  : +delong.   Skin: No rashes    LABS:                        8.6    9.09  )-----------( 327      ( 15 Feb 2024 10:00 )             26.7     Na(139)/K(3.6)/Cl(99)/HCO3(26)/BUN(23)/Cr(1.65)Glu(212)/Ca(8.0)/Mg(1.80)/PO4(3.2)    02-15 @ 00:54  Na(139)/K(4.0)/Cl(101)/HCO3(26)/BUN(22)/Cr(1.62)Glu(133)/Ca(8.4)/Mg(--)/PO4(--)    02-14 @ 14:45  Na(139)/K(3.8)/Cl(101)/HCO3(29)/BUN(24)/Cr(1.63)Glu(122)/Ca(8.2)/Mg(2.00)/PO4(4.1)    02-14 @ 01:50  Na(133)/K(4.5)/Cl(96)/HCO3(28)/BUN(27)/Cr(1.48)Glu(211)/Ca(7.9)/Mg(2.10)/PO4(3.5)    02-13 @ 01:43      IMPRESSION:  89M w/ DM2, HTN, CVA, PAD, CKD3, and CAD-CABG, 12/23/23 p/w COVID19 infection, c/b respiratory failure/hypotension; now s/p tracheostomy    (1)Renal - CKD3; superimposed mild prerenal azotemia - numbers fluctuating based on hemodynamic status  (2)Lytes - acceptable   (3)CV - pressor-dependent; pressor gtts being weaned down. I see he is on Cardura; does he need it? He has a delong in place....and it is likely serving to lower his BP  (4)Pulm - vent-dependent   (5ID - still febrile; on IV Zosyn  (6)GI - needing to be febrile for 24h prior to PEG    RECOMMEND:   (1)Favor d/c of Cardura  (2)Continue abx for GFR 30-40ml/min      Davey Chacko MD  [facility]  Office/on call physician: [phone]  Cell (7a-7p): [phone]

## 2024-02-15 NOTE — PROGRESS NOTE ADULT - ASSESSMENT
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     EKG SR RBBB (old per family)     1) Hypoxia   - s/p bronch   - Rt pleural effusion   - held lasix given Hypernatremia and worsening creat  - intubated again , AMS    - f/u neuro recs MRI brain shows no acute disease  - s/p trach   - peg     2) CAD s/p CABG  - p/w chest pain. EKG non ischemic , trop -sera   - chest pains  Trop mildly elevated and trending down likely sec to kidney disease,   - holding brilinta, was on it for SMA stent placed in 12/2023, held 2/2 anticipation for PEG placement, restart when no further invasive procedures planned  -TTE 2/12 with  mod decrease LV systolic function, new. euvolemic on exam, if oxygen requirements on vent increase consider diuretics. given current condition will medically manage likely stress induced     3) Covid +  - s/p remdesivir   - c/w supportive management   - t/t per primary team     4) Abd distension   -CTA AP obtained which showed  partially occlusive calcified and non-calcified plaque just distal to take-off of the SMA, with estimated at least 75% luminal narrowing. Limited evaluation of its distal branches. 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   -on Protonix IV q 12      6) Afib  -hx of PAF  -no AC 2/2 GIB  -resume metoprolol when weaned off pressors and able to tolerate

## 2024-02-15 NOTE — PROGRESS NOTE ADULT - ATTENDING COMMENTS
89M with PMH of CABG DM2, CKD presenting to MICU after bronchoscopy to emergently remove mucous plugs obstructing mainstem bronchus (1/19) intubated on 1/22. Course since complicated by acute asa (s/p perc) and recent SMA stent. s/p tracheostomy placement ( 7 portex DCT) without issue. No signs of bleeding at trach site today. Secured with suture / ties. Can remove proline sutures in 10 days from placement. please minimize tension on ventilator circuit to trach.            Rangel Kinsey MD  Interventional Pulmonology & Critical Care Medicine .

## 2024-02-15 NOTE — PROGRESS NOTE ADULT - SUBJECTIVE AND OBJECTIVE BOX
Aashish Boyer MD  Interventional Cardiology / Endovascular Specialist  [place] Office : [phone] [place] [address] [place] [address]  Tel:   [place] Office : [address] [facility] [place] [address]  Tel: [phone]    Subjective/Overnight events: Patient lying in bed remains intubated in MICU  	  	  MEDICATIONS:  aspirin  chewable 81 milliGRAM(s) Enteral Tube daily  doxazosin 2 milliGRAM(s) Oral at bedtime  heparin   Injectable 5000 Unit(s) SubCutaneous every 8 hours  midodrine 30 milliGRAM(s) Oral every 8 hours  norepinephrine Infusion 0.05 MICROgram(s)/kG/Min IV Continuous <Continuous>  tranexamic acid Injectable for Topical Use 10 milliLiter(s) Topical once    piperacillin/tazobactam IVPB.. 3.375 Gram(s) IV Intermittent every 8 hours    albuterol/ipratropium for Nebulization 3 milliLiter(s) Nebulizer every 6 hours  sodium chloride 3%  Inhalation 4 milliLiter(s) Inhalation two times a day    escitalopram 10 milliGRAM(s) Oral daily  levETIRAcetam  IVPB 500 milliGRAM(s) IV Intermittent every 12 hours  propofol Infusion 10.004 MICROgram(s)/kG/Min IV Continuous <Continuous>    pantoprazole  Injectable 40 milliGRAM(s) IV Push every 12 hours  sucralfate suspension 1 Gram(s) Enteral Tube two times a day    dextrose 50% Injectable 25 Gram(s) IV Push once  dextrose 50% Injectable 25 Gram(s) IV Push once  dextrose 50% Injectable 12.5 Gram(s) IV Push once  dextrose Oral Gel 15 Gram(s) Oral once PRN  glucagon  Injectable 1 milliGRAM(s) IntraMuscular once  insulin glargine Injectable (LANTUS) 12 Unit(s) SubCutaneous <User Schedule>  insulin lispro (ADMELOG) corrective regimen sliding scale   SubCutaneous every 6 hours    artificial  tears Solution 1 Drop(s) Left EYE four times a day  chlorhexidine 0.12% Liquid 15 milliLiter(s) Oral Mucosa every 12 hours  chlorhexidine 2% Cloths 1 Application(s) Topical daily  dextrose 5%. 1000 milliLiter(s) IV Continuous <Continuous>  dextrose 5%. 1000 milliLiter(s) IV Continuous <Continuous>  mupirocin 2% Ointment 1 Application(s) Topical two times a day  petrolatum Ophthalmic Ointment 1 Application(s) Left EYE at bedtime      PAST MEDICAL/SURGICAL HISTORY  PAST MEDICAL & SURGICAL HISTORY:  Hyperlipemia      Hypertension      Coronary Artery Disease      Diabetes Mellitus Type II      Stented Coronary Artery  total 5 stents, last stent 5/2019      Neuropathy      Myocardial infarction      Stroke  mild left facial numbness   no other residuals verbalized      Myoclonic jerking      Stage 3 chronic kidney disease      History of Cataract Extraction      Hx of CABG      H/O coronary angiogram      S/P coronary artery stent placement  1/6/09      S/P placement of cardiac pacemaker          SOCIAL HISTORY: Substance Use (street drugs): ( x ) never used  (  ) other:    FAMILY HISTORY:  No pertinent family history in first degree relatives        REVIEW OF SYSTEMS:  CONSTITUTIONAL: No fever, weight loss, or fatigue  EYES: No eye pain, visual disturbances, or discharge  ENMT:  No difficulty hearing, tinnitus, vertigo; No sinus or throat pain  BREASTS: No pain, masses, or nipple discharge  GASTROINTESTINAL: No abdominal or epigastric pain. No nausea, vomiting, or hematemesis; No diarrhea or constipation. No melena or hematochezia.  GENITOURINARY: No dysuria, frequency, hematuria, or incontinence  NEUROLOGICAL: No headaches, memory loss, loss of strength, numbness, or tremors  ENDOCRINE: No heat or cold intolerance; No hair loss  MUSCULOSKELETAL: No joint pain or swelling; No muscle, back, or extremity pain  PSYCHIATRIC: No depression, anxiety, mood swings, or difficulty sleeping  HEME/LYMPH: No easy bruising, or bleeding gums  All others negative    PHYSICAL EXAM:  T(C): 36.8 (02-15-24 @ 12:00), Max: 38 (02-15-24 @ 03:00)  HR: 102 (02-15-24 @ 16:00) (90 - 102)  BP: 117/48 (02-15-24 @ 16:00) (100/33 - 125/50)  RR: 18 (02-15-24 @ 16:00) (12 - 22)  SpO2: 99% (02-15-24 @ 16:00) (98% - 100%)  Wt(kg): --  I&O's Summary    14 Feb 2024 07:01  -  15 Feb 2024 07:00  --------------------------------------------------------  IN: 1757.2 mL / OUT: 2310 mL / NET: -552.8 mL    15 Feb 2024 07:01  -  15 Feb 2024 16:06  --------------------------------------------------------  IN: 894.9 mL / OUT: 970 mL / NET: -75.1 mL        GENERAL: NAD  EYES:  conjunctiva and sclera clear  ENMT: No tonsillar erythema, exudates, or enlargement  Cardiovascular: Normal S1 S2, No JVD, No murmurs, No edema  Respiratory: Lungs clear to auscultation	  Gastrointestinal:  Soft,   Extremities: No edema                            8.6    9.09  )-----------( 327      ( 15 Feb 2024 10:00 )             26.7     02-15    139  |  99  |  23  ----------------------------<  212<H>  3.6   |  26  |  1.65<H>    Ca    8.0<L>      15 Feb 2024 00:54  Phos  3.2     02-15  Mg     1.80     02-15    TPro  6.1  /  Alb  2.1<L>  /  TBili  0.3  /  DBili  x   /  AST  33  /  ALT  21  /  AlkPhos  83  02-15    proBNP:   Lipid Profile:   HgA1c:   TSH:     Consultant(s) Notes Reviewed:  [x ] YES  [ ] NO    Care Discussed with Consultants/Other Providers [ x] YES  [ ] NO    Imaging Personally Reviewed independently:  [x] YES  [ ] NO    All labs, radiologic studies, vitals, orders and medications list reviewed. Patient is seen and examined at bedside. Case discussed with medical team.

## 2024-02-15 NOTE — PROGRESS NOTE ADULT - SUBJECTIVE AND OBJECTIVE BOX
CC: F/U for Fever    Saw/spoke to patient. Fevers, no chills. Intubated. Sedated.    Allergies  fluoroquinolone antibiotics (Other)  Cipro (Unknown)  Tegretol (Unknown)  carbamazepine (Other)      ANTIMICROBIALS:  piperacillin/tazobactam IVPB.. 3.375 every 8 hours    PE:    Vital Signs Last 24 Hrs  T(C): 36.8 (15 Feb 2024 12:00), Max: 38 (15 Feb 2024 03:00)  T(F): 98.3 (15 Feb 2024 12:00), Max: 100.4 (15 Feb 2024 03:00)  HR: 96 (15 Feb 2024 12:00) (90 - 101)  BP: 118/49 (15 Feb 2024 12:00) (100/33 - 124/45)  BP(mean): 65 (15 Feb 2024 12:00) (50 - 67)  RR: 13 (15 Feb 2024 12:00) (12 - 22)  SpO2: 100% (15 Feb 2024 12:00) (98% - 100%)    Gen: AOx0-1, poorly interactive, sedated  CV: Nontachycardic  Resp: Breathing comfortably, trach  Abd: Soft, nontender  IV/Skin: No thrombophlebitis    LABS:                        8.6    9.09  )-----------( 327      ( 15 Feb 2024 10:00 )             26.7     02-15    139  |  99  |  23  ----------------------------<  212<H>  3.6   |  26  |  1.65<H>    Ca    8.0<L>      15 Feb 2024 00:54  Phos  3.2     02-15  Mg     1.80     02-15    TPro  6.1  /  Alb  2.1<L>  /  TBili  0.3  /  DBili  x   /  AST  33  /  ALT  21  /  AlkPhos  83  02-15    Urinalysis Basic - ( 15 Feb 2024 00:54 )    Color: x / Appearance: x / SG: x / pH: x  Gluc: 212 mg/dL / Ketone: x  / Bili: x / Urobili: x   Blood: x / Protein: x / Nitrite: x   Leuk Esterase: x / RBC: x / WBC x   Sq Epi: x / Non Sq Epi: x / Bacteria: x    MICROBIOLOGY:    .Bronchial Bronchial Lavage  02-13-24   Normal Respiratory Aurelia present  --    Numerous polymorphonuclear leukocytes seen per low power field  Rare Squamous epithelial cells seen per low power field  No organisms seen per oil power field    .Blood Blood-Peripheral  02-11-24   No growth at 72 Hours  --  --    .Body Fluid gallbladder  01-26-24   No growth at 5 days  --    No polymorphonuclear leukocytes per low power field  No organisms seen per oil power field    .Blood Blood  01-24-24   No growth at 5 days  --  --    .Bronchial Bronchial  01-22-24   Normal Respiratory Aurelia present  --    Moderate polymorphonuclear leukocytes seen per low power field  No squamous epithelial cells per low power field  No organisms seen per oil power field    .Blood Blood-Peripheral  01-20-24   No growth at 5 days  --  --    .Blood Blood-Peripheral  01-20-24   No growth at 5 days  --  --    .Bronchial R MIDDLE LOBE  01-19-24   Numerous Staphylococcus aureus  Normal Respiratory Aurelia present  --  Staphylococcus aureus    Rapid RVP Result: NotDetec (02-11 @ 23:23)    RADIOLOGY:    1/25 CT    IMPRESSION:  New and increased bilateral lung consolidations and patchy opacities,   which may represent a combination of pulmonary edema, atelectasis and/or   pneumonia.  Similar moderate right and small left pleural effusions.  Similar distended gallbladder with cholelithiasis and pericholecystic fat   stranding, which remains concerning for acute cholecystitis.      2/13    IMPRESSION:  *  Status post extubation.  *  Tracheostomy and NG tube placement.  *  Hazy lung bases likely effusions/atelectasis.

## 2024-02-15 NOTE — PROGRESS NOTE ADULT - ASSESSMENT
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 Course c/b acute cholecystitis s/p perc asa 1/26 by IR, also with recent SMA stent.   Patient failed trial of extubation now pending trach and peg    #prolonged ventilation  Patient underwent uneventful percutaneous tracheostomy with size 7 portex 2/13/24. See procedure notes for further details.       Post-tracheostomy care instructions:  -Minimize tension on tracheostomy: Keep tracheostomy elevated on towels to maintain perpendicular to neck and keep enough slack on vent tubing to avoid tension  -Sedate/Restrain patient to ensure does not pull at tracheostomy  -Keep tracheostomy retention collar in place at all times  -Utilize tracheostomy specific in-line suction or red rubber suction tubing through the swivel valve  -First dressing change at 72 hours. If dressings are grossly saturated before that time, reenforce dressings and page pulmonary/critical care fellow  2 sutures are to remain in place for 10 days, please do not remove before then. Sutures may be removed by the primary service. If feel there are issues with the sutures, please contact MICU/Pulmonary fellow  -First tracheostomy change to happen in 3 weeks. If patient still admitted at that time please page pulmonary service to arrange exchange

## 2024-02-15 NOTE — PROGRESS NOTE ADULT - SUBJECTIVE AND OBJECTIVE BOX
Interval Events: pt seen and examined, grandson at bedside. drop in hgb- getting prbc, low grade fever this AM (not r/t transfusion per rn), on levo- stable dose      Allergies:  fluoroquinolone antibiotics (Other)  Cipro (Unknown)  Tegretol (Unknown)  carbamazepine (Other)      Hospital Medications:  albuterol/ipratropium for Nebulization 3 milliLiter(s) Nebulizer every 6 hours  artificial  tears Solution 1 Drop(s) Left EYE four times a day  aspirin  chewable 81 milliGRAM(s) Enteral Tube daily  chlorhexidine 0.12% Liquid 15 milliLiter(s) Oral Mucosa every 12 hours  chlorhexidine 2% Cloths 1 Application(s) Topical daily  dextrose 5%. 1000 milliLiter(s) IV Continuous <Continuous>  dextrose 5%. 1000 milliLiter(s) IV Continuous <Continuous>  dextrose 50% Injectable 25 Gram(s) IV Push once  dextrose 50% Injectable 12.5 Gram(s) IV Push once  dextrose 50% Injectable 25 Gram(s) IV Push once  dextrose Oral Gel 15 Gram(s) Oral once PRN  doxazosin 2 milliGRAM(s) Oral at bedtime  escitalopram 10 milliGRAM(s) Oral daily  glucagon  Injectable 1 milliGRAM(s) IntraMuscular once  insulin glargine Injectable (LANTUS) 12 Unit(s) SubCutaneous <User Schedule>  insulin lispro (ADMELOG) corrective regimen sliding scale   SubCutaneous every 6 hours  levETIRAcetam  IVPB 500 milliGRAM(s) IV Intermittent every 12 hours  midodrine 20 milliGRAM(s) Oral every 8 hours  mupirocin 2% Ointment 1 Application(s) Topical two times a day  norepinephrine Infusion 0.05 MICROgram(s)/kG/Min IV Continuous <Continuous>  pantoprazole  Injectable 40 milliGRAM(s) IV Push every 12 hours  petrolatum Ophthalmic Ointment 1 Application(s) Left EYE at bedtime  piperacillin/tazobactam IVPB.. 3.375 Gram(s) IV Intermittent every 8 hours  propofol Infusion 10.004 MICROgram(s)/kG/Min IV Continuous <Continuous>  sodium chloride 3%  Inhalation 4 milliLiter(s) Inhalation two times a day  sucralfate suspension 1 Gram(s) Enteral Tube two times a day  tranexamic acid Injectable for Topical Use 10 milliLiter(s) Topical once      ROS: As per HPI, otherwise 10-point ROS reviewed and negative.    Vital Signs:  Vital Signs Last 24 Hrs  T(C): 37.3 (15 Feb 2024 08:00), Max: 38 (15 Feb 2024 03:00)  T(F): 99.1 (15 Feb 2024 08:), Max: 100.4 (15 Feb 2024 03:00)  HR: 90 (15 Feb 2024 08:) (90 - 101)  BP: 101/41 (15 Feb 2024 08:) (100/33 - 129/45)  BP(mean): 57 (15 Feb 2024 08:) (50 - 67)  RR: 12 (15 Feb 2024 08:) (12 - 22)  SpO2: 98% (15 Feb 2024 08:) (98% - 100%)    Parameters below as of 15 Feb 2024 08:  Patient On (Oxygen Delivery Method): ventilator    O2 Concentration (%): 30  Daily     Daily Weight in k.2 (15 Feb 2024 00:00)      24 @ 07:01  -  02-15-24 @ 07:00  --------------------------------------------------------  IN: 1757.2 mL / OUT: 2310 mL / NET: -552.8 mL    02-15-24 @ 07:01  -  02-15-24 @ 08:30  --------------------------------------------------------  IN: 21 mL / OUT: 90 mL / NET: -69 mL        LABS:                        6.8    8.41  )-----------( 367      ( 15 Feb 2024 02:00 )             22.2     Mean Cell Volume: 94.5 fL (02-15-24 @ 02:00)    02-15    139  |  99  |  23  ----------------------------<  212<H>  3.6   |  26  |  1.65<H>    Ca    8.0<L>      15 Feb 2024 00:54  Phos  3.2     -15  Mg     1.80     15    TPro  6.1  /  Alb  2.1<L>  /  TBili  0.3  /  DBili  x   /  AST  33  /  ALT  21  /  AlkPhos  83  15    LIVER FUNCTIONS - ( 15 Feb 2024 00:54 )  Alb: 2.1 g/dL / Pro: 6.1 g/dL / ALK PHOS: 83 U/L / ALT: 21 U/L / AST: 33 U/L / GGT: x           PT/INR - ( 15 Feb 2024 00:54 )   PT: 12.5 sec;   INR: 1.12 ratio         PTT - ( 15 Feb 2024 00:54 )  PTT:29.9 sec  Urinalysis Basic - ( 15 Feb 2024 00:54 )    Color: x / Appearance: x / SG: x / pH: x  Gluc: 212 mg/dL / Ketone: x  / Bili: x / Urobili: x   Blood: x / Protein: x / Nitrite: x   Leuk Esterase: x / RBC: x / WBC x   Sq Epi: x / Non Sq Epi: x / Bacteria: x                              6.8    8.41  )-----------( 367      ( 15 Feb 2024 02:00 )             22.2                         6.9    8.56  )-----------( 356      ( 15 Feb 2024 00:54 )             22.0                         7.2    9.07  )-----------( 379      ( 2024 01:50 )             22.9                         7.1    7.35  )-----------( 358      ( 2024 01:43 )             23.0       PHYSICAL EXAM  GENERAL:  Lying in bed in NAD  HEENT:  NCAT, no scleral icterus  NECK: Trachea midline  CHEST:  Resp even, non labored  ABDOMEN:  Soft, non-tender, non-distended  EXTREMITIES:  WWP, no edema  SKIN:  No visible rash or jaundice  NEURO:  Alert and oriented x 3, no asterixis    Imaging:           Interval Events: pt seen and examined, grandson at bedside. drop in hgb- getting prbc, low grade fever this AM (not r/t transfusion per rn),  bld cxs ngtd, on levo dose  stable       Allergies:  fluoroquinolone antibiotics (Other)  Cipro (Unknown)  Tegretol (Unknown)  carbamazepine (Other)      Hospital Medications:  albuterol/ipratropium for Nebulization 3 milliLiter(s) Nebulizer every 6 hours  artificial  tears Solution 1 Drop(s) Left EYE four times a day  aspirin  chewable 81 milliGRAM(s) Enteral Tube daily  chlorhexidine 0.12% Liquid 15 milliLiter(s) Oral Mucosa every 12 hours  chlorhexidine 2% Cloths 1 Application(s) Topical daily  dextrose 5%. 1000 milliLiter(s) IV Continuous <Continuous>  dextrose 5%. 1000 milliLiter(s) IV Continuous <Continuous>  dextrose 50% Injectable 25 Gram(s) IV Push once  dextrose 50% Injectable 12.5 Gram(s) IV Push once  dextrose 50% Injectable 25 Gram(s) IV Push once  dextrose Oral Gel 15 Gram(s) Oral once PRN  doxazosin 2 milliGRAM(s) Oral at bedtime  escitalopram 10 milliGRAM(s) Oral daily  glucagon  Injectable 1 milliGRAM(s) IntraMuscular once  insulin glargine Injectable (LANTUS) 12 Unit(s) SubCutaneous <User Schedule>  insulin lispro (ADMELOG) corrective regimen sliding scale   SubCutaneous every 6 hours  levETIRAcetam  IVPB 500 milliGRAM(s) IV Intermittent every 12 hours  midodrine 20 milliGRAM(s) Oral every 8 hours  mupirocin 2% Ointment 1 Application(s) Topical two times a day  norepinephrine Infusion 0.05 MICROgram(s)/kG/Min IV Continuous <Continuous>  pantoprazole  Injectable 40 milliGRAM(s) IV Push every 12 hours  petrolatum Ophthalmic Ointment 1 Application(s) Left EYE at bedtime  piperacillin/tazobactam IVPB.. 3.375 Gram(s) IV Intermittent every 8 hours  propofol Infusion 10.004 MICROgram(s)/kG/Min IV Continuous <Continuous>  sodium chloride 3%  Inhalation 4 milliLiter(s) Inhalation two times a day  sucralfate suspension 1 Gram(s) Enteral Tube two times a day  tranexamic acid Injectable for Topical Use 10 milliLiter(s) Topical once      ROS: unable to obtain from pt    Vital Signs:  Vital Signs Last 24 Hrs  T(C): 37.3 (15 Feb 2024 08:00), Max: 38 (15 Feb 2024 03:00)  T(F): 99.1 (15 Feb 2024 08:), Max: 100.4 (15 Feb 2024 03:00)  HR: 90 (15 Feb 2024 08:) (90 - 101)  BP: 101/41 (15 Feb 2024 08:) (100/33 - 129/45)  BP(mean): 57 (15 Feb 2024 08:) (50 - 67)  RR: 12 (15 Feb 2024 08:) (12 - 22)  SpO2: 98% (15 Feb 2024 08:) (98% - 100%)    Parameters below as of 15 Feb 2024 08:  Patient On (Oxygen Delivery Method): ventilator    O2 Concentration (%): 30  Daily     Daily Weight in k.2 (15 Feb 2024 00:00)      24 @ 07:01  -  02-15-24 @ 07:00  --------------------------------------------------------  IN: 1757.2 mL / OUT: 2310 mL / NET: -552.8 mL    02-15-24 @ 07:01  -  02-15-24 @ 08:30  --------------------------------------------------------  IN: 21 mL / OUT: 90 mL / NET: -69 mL        LABS:                        6.8    8.41  )-----------( 367      ( 15 Feb 2024 02:00 )             22.2     Mean Cell Volume: 94.5 fL (02-15-24 @ 02:00)    02-15    139  |  99  |  23  ----------------------------<  212<H>  3.6   |  26  |  1.65<H>    Ca    8.0<L>      15 Feb 2024 00:54  Phos  3.2     02-15  Mg     1.80     15    TPro  6.1  /  Alb  2.1<L>  /  TBili  0.3  /  DBili  x   /  AST  33  /  ALT  21  /  AlkPhos  83  15    LIVER FUNCTIONS - ( 15 Feb 2024 00:54 )  Alb: 2.1 g/dL / Pro: 6.1 g/dL / ALK PHOS: 83 U/L / ALT: 21 U/L / AST: 33 U/L / GGT: x           PT/INR - ( 15 Feb 2024 00:54 )   PT: 12.5 sec;   INR: 1.12 ratio         PTT - ( 15 Feb 2024 00:54 )  PTT:29.9 sec  Urinalysis Basic - ( 15 Feb 2024 00:54 )    Color: x / Appearance: x / SG: x / pH: x  Gluc: 212 mg/dL / Ketone: x  / Bili: x / Urobili: x   Blood: x / Protein: x / Nitrite: x   Leuk Esterase: x / RBC: x / WBC x   Sq Epi: x / Non Sq Epi: x / Bacteria: x                              6.8    8.41  )-----------( 367      ( 15 Feb 2024 02:00 )             22.2                         6.9    8.56  )-----------( 356      ( 15 Feb 2024 00:54 )             22.0                         7.2    9.07  )-----------( 379      ( 2024 01:50 )             22.9                         7.1    7.35  )-----------( 358      ( 2024 01:43 )             23.0       PHYSICAL EXAM  GENERAL: lying in bed, chronically ill appearing  HEENT: +trach  CHEST: on vent  ABDOMEN:  softly distended, appears non-tender  EXTREMITIES: WWP  SKIN:  warm/dry  PSYCH: sedated  NEURO: no tremor noted

## 2024-02-15 NOTE — PROGRESS NOTE ADULT - ASSESSMENT
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     PLAN  =====Neurologic=====  #CVA  - prior CVA x3 with no residual deficits    #myoclonic jerks  - on Keppra 500 mg BID, per neuro wishing to wean however per family request will continue at 500 mg BID, but now off valproate per neuro    - also per neuro, use precedex as needed for clinical sedation   -Started prop on 2/10, in anticipation of procedure  - holding home  gabapentin and memantine in setting of somnolence  - continue lexapro     #AMS  - CT/CTA negative for stroke. EEG now off   - MRI brain - unremarkable findings. Family now amenable to Trach/PEG.       =====Pulmonary=====    #COVID  - s/p paxlovid and 1 dose remdesivir while inpatient    #Pleural effusions b/l  - CT chest from 1/16 showing new small bilateral pleural effusions/ atelectasis/ patchy bilateral ground-glass opacities are consistent with pulmonary edema.  - Diuresis PRN based on amount of pleural effusions on POCUS    #AHRF  - Intubated for airway protection in s/o AMS. Sedated  - Holding brilinta for possible procedure. Will need to restart ASAP after trach/PEG  - s/p zosyn for aspiration PNA from 1/20- 1/28   - MRSA Swab positive for Staph Aureus only, started on Mupirocin 2/13-  - Restarted on Zosyn. BAL culture negative  - Trach 2/13/2024.     =====Cardiovascular=====  Midodrine 20 q8h, wean prop as tolerated to wean pressors    #HTN  - on home amlodipine and metoprolol currently holding     #CAD  - on Brilinta (holding) aspirin and ranolazine continue   - as per vascular okay to hold Brilinta for possible pleural effusion thoracentesis  -have not heard back from cardiology regarding Brilinta / last stent 2022 , will hold   -Need to restart brilinta ASAP after Trach/PEG    #Afib  - pt with known history of pAF, first observed 2/1  - can consider starting AC and/or rate control if persists     =====GI=====  #upper GI bleed  - s/p EGD showing dieulafoy lesion and esophagitis likely 2/2 NGT irritation s/p 2 clips  - on protonix IV BID continue   - CT on 1/25 with cholelithiasis and sludge HIDA on 1/26 confirming likely acute choley, s/p IR perc cholecystostomy 1/26   - Discussed w/ family, want trach/PEG. Discussed w/ GI - Only will do PEG 24-48 hours after trach placed. Recommend reaching out when trach date is set  - Trach 2/13, will e-mail GI per their instruction for consultation for PEG afterwards.     #Diet  - tube feeds NGT   - Per GI, no PEG today but unclear when GI will place PEG, will get back re: timing    =====Renal/=====  #Electrolytes  - Maintain K>4, Phos>3, Mag>2, iCal>1  - baseline cr 1.5, at baseline  - on free water flushes for hyperNa. Trend BMP/Na - adjsted to 200 q12h   - Diuresis PRN - Will give 40 mg IVP Lasix 2/11.   - TOV when trach/PEG completed    =====Endocrine=====  #DM2  - on lantus 12U and q6 SSI  - a1c 9.3, glucose wnl inpatient     =====Infectious Disease=====  #COVID   - s/p paxlovid and 1 dose remdesivir  # PNA  - CT chest showing ground glass opacities  - s/p zosyn  - intermittent episodes of fevers, likely source GI given choleycystitis? culture NGTD  - meropenem 5d course thru 2/1   - now monitoring off antibiotics  - newly hypothermic on 2/11, on Zosyn, unclear source, work-up unrevealing    =====Heme/Onc=====  #DVT Ppx  - heparin sub-q held in s/o procedure, restart when able    =====Ethics=====  FULL CODE.  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     PLAN  =====Neurologic=====  #CVA  - prior CVA x3 with no residual deficits    #myoclonic jerks  - on Keppra 500 mg BID, per neuro wishing to wean however per family request will continue at 500 mg BID, but now off valproate per neuro    - also per neuro, use precedex as needed for clinical sedation    - Started prop on 2/10, in anticipation of procedure  - holding home gabapentin and memantine in setting of somnolence  - continue lexapro     #AMS  - CT/CTA negative for stroke. EEG now off   - MRI brain - unremarkable findings. Family now amenable to Trach/PEG.     =====Pulmonary=====    #COVID  - s/p paxlovid and 1 dose remdesivir while inpatient    #Pleural effusions b/l  - CT chest from 1/16 showing new small bilateral pleural effusions/ atelectasis/ patchy bilateral ground-glass opacities are consistent with pulmonary edema.  - Diuresis PRN based on amount of pleural effusions on POCUS    #AHRF  - Intubated for airway protection in s/o AMS. Sedated  - Holding brilinta for possible procedure. Will need to restart ASAP after trach/PEG  - s/p zosyn for aspiration PNA from 1/20- 1/28   - MRSA Swab positive for Staph Aureus only, started on Mupirocin 2/13-  - Restarted on Zosyn. BAL culture negative  - Trach 2/13/2024.     =====Cardiovascular=====  Midodrine 30 q8h, wean prop as tolerated to wean pressors    #HTN  - on home amlodipine and metoprolol currently holding     #CAD  - on Brilinta (holding) aspirin and ranolazine continue   - as per vascular okay to hold Brilinta for possible pleural effusion thoracentesis  -have not heard back from cardiology regarding Brilinta / last stent 2022 , will hold   -Need to restart brilinta ASAP after Trach/PEG    #Afib  - pt with known history of pAF, first observed 2/1  - can consider starting AC and/or rate control if persists     =====GI=====  #upper GI bleed  - s/p EGD showing dieulafoy lesion and esophagitis likely 2/2 NGT irritation s/p 2 clips  - on protonix IV BID continue   - CT on 1/25 with cholelithiasis and sludge HIDA on 1/26 confirming likely acute choley, s/p IR perc cholecystostomy 1/26   - Discussed w/ family, want trach/PEG. Discussed w/ GI - Only will do PEG 24-48 hours after trach placed. Recommend reaching out when trach date is set  - Trach 2/13, per GI PEG only after afebrile or ID clearance for "noninfectious fever" given intermittent fevers. Reached out to ID.     #Diet  - tube feeds NGT   - Per GI, no PEG today but unclear when GI will place PEG, will get back re: timing    =====Renal/=====  #Electrolytes  - Maintain K>4, Phos>3, Mag>2, iCal>1  - baseline cr 1.5, at baseline  - on free water flushes for hyperNa. Trend BMP/Na - adjsted to 200 q12h   - Diuresis PRN - Will give 40 mg IVP Lasix 2/15.   - TOV when trach/PEG completed    =====Endocrine=====  #DM2  - on lantus 12U and q6 SSI  - a1c 9.3, glucose wnl inpatient     =====Infectious Disease=====  #COVID   - s/p paxlovid and 1 dose remdesivir  # PNA  - CT chest showing ground glass opacities  - s/p zosyn  - intermittent episodes of fevers, likely source GI given choleycystitis? culture NGTD  - meropenem 5d course thru 2/1   - now monitoring off antibiotics  - newly hypothermic on 2/11, on Zosyn, unclear source, work-up unrevealing, plan for 7-day course    =====Heme/Onc=====  #DVT Ppx  - heparin sub-q held in s/o procedure, restart when able    =====Ethics=====  FULL CODE.  After family discussion, Trach/PEG

## 2024-02-15 NOTE — PROGRESS NOTE ADULT - SUBJECTIVE AND OBJECTIVE BOX
***************************************************************  Candelario (Renee) Shantel PGY2  [facility]   ***************************************************************    SHAIK CHARANJIT  89y  MRN: [id number]    Patient is a 89y old  Male who presents with a chief complaint of COVID19, Chest pain (14 Feb 2024 17:06)      Subjective: no events ON. Denies fever, CP, SOB, abn pain, N/V, dysuria. Tolerating diet.      MEDICATIONS  (STANDING):  albuterol/ipratropium for Nebulization 3 milliLiter(s) Nebulizer every 6 hours  artificial  tears Solution 1 Drop(s) Left EYE four times a day  aspirin  chewable 81 milliGRAM(s) Enteral Tube daily  chlorhexidine 0.12% Liquid 15 milliLiter(s) Oral Mucosa every 12 hours  chlorhexidine 2% Cloths 1 Application(s) Topical daily  dextrose 5%. 1000 milliLiter(s) (100 mL/Hr) IV Continuous <Continuous>  dextrose 5%. 1000 milliLiter(s) (50 mL/Hr) IV Continuous <Continuous>  dextrose 50% Injectable 25 Gram(s) IV Push once  dextrose 50% Injectable 25 Gram(s) IV Push once  dextrose 50% Injectable 12.5 Gram(s) IV Push once  doxazosin 2 milliGRAM(s) Oral at bedtime  escitalopram 10 milliGRAM(s) Oral daily  glucagon  Injectable 1 milliGRAM(s) IntraMuscular once  insulin glargine Injectable (LANTUS) 12 Unit(s) SubCutaneous <User Schedule>  insulin lispro (ADMELOG) corrective regimen sliding scale   SubCutaneous every 6 hours  levETIRAcetam  IVPB 500 milliGRAM(s) IV Intermittent every 12 hours  midodrine 20 milliGRAM(s) Oral every 8 hours  mupirocin 2% Ointment 1 Application(s) Topical two times a day  norepinephrine Infusion 0.05 MICROgram(s)/kG/Min (3.72 mL/Hr) IV Continuous <Continuous>  pantoprazole  Injectable 40 milliGRAM(s) IV Push every 12 hours  petrolatum Ophthalmic Ointment 1 Application(s) Left EYE at bedtime  piperacillin/tazobactam IVPB.. 3.375 Gram(s) IV Intermittent every 8 hours  propofol Infusion 10.004 MICROgram(s)/kG/Min (4.76 mL/Hr) IV Continuous <Continuous>  sodium chloride 3%  Inhalation 4 milliLiter(s) Inhalation two times a day  sucralfate suspension 1 Gram(s) Enteral Tube two times a day  tranexamic acid Injectable for Topical Use 10 milliLiter(s) Topical once    MEDICATIONS  (PRN):  dextrose Oral Gel 15 Gram(s) Oral once PRN Blood Glucose LESS THAN 70 milliGRAM(s)/deciliter      Objective:    Vitals: Vital Signs Last 24 Hrs  T(C): 38 (02-15-24 @ 03:00), Max: 38 (02-15-24 @ 03:00)  T(F): 100.4 (02-15-24 @ 03:00), Max: 100.4 (02-15-24 @ 03:00)  HR: 92 (02-15-24 @ 07:00) (90 - 101)  BP: 114/41 (02-15-24 @ 07:00) (100/33 - 129/45)  BP(mean): 61 (02-15-24 @ 07:00) (50 - 67)  RR: 12 (02-15-24 @ 07:00) (12 - 22)  SpO2: 99% (02-15-24 @ 07:00) (99% - 100%)            I&O's Summary    14 Feb 2024 07:01  -  15 Feb 2024 07:00  --------------------------------------------------------  IN: 1757.2 mL / OUT: 2310 mL / NET: -552.8 mL        PHYSICAL EXAM:  GENERAL: NAD  HEAD:  Atraumatic, Normocephalic  EYES: EOMI, conjunctiva and sclera clear  CHEST/LUNG: Clear to auscultation bilaterally; No rales, rhonchi, wheezing, or rubs  HEART: Regular rate and rhythm; No murmurs, rubs, or gallops  ABDOMEN: Soft, Nontender, Nondistended;   SKIN: No rashes or lesions  NERVOUS SYSTEM:  Alert & Oriented X3, no focal deficits    LABS:  02-15    139  |  99  |  23  ----------------------------<  212<H>  3.6   |  26  |  1.65<H>  02-14    139  |  101  |  22  ----------------------------<  133<H>  4.0   |  26  |  1.62<H>  02-14    139  |  101  |  24<H>  ----------------------------<  122<H>  3.8   |  29  |  1.63<H>    Ca    8.0<L>      15 Feb 2024 00:54  Ca    8.4      14 Feb 2024 14:45  Ca    8.2<L>      14 Feb 2024 01:50  Phos  3.2     02-15  Mg     1.80     02-15    TPro  6.1  /  Alb  2.1<L>  /  TBili  0.3  /  DBili  x   /  AST  33  /  ALT  21  /  AlkPhos  83  02-15  TPro  6.7  /  Alb  2.3<L>  /  TBili  0.4  /  DBili  x   /  AST  29  /  ALT  23  /  AlkPhos  81  02-14  TPro  6.4  /  Alb  2.3<L>  /  TBili  0.3  /  DBili  x   /  AST  22  /  ALT  21  /  AlkPhos  84  02-14      PT/INR - ( 15 Feb 2024 00:54 )   PT: 12.5 sec;   INR: 1.12 ratio         PTT - ( 15 Feb 2024 00:54 )  PTT:29.9 sec              Urinalysis Basic - ( 15 Feb 2024 00:54 )    Color: x / Appearance: x / SG: x / pH: x  Gluc: 212 mg/dL / Ketone: x  / Bili: x / Urobili: x   Blood: x / Protein: x / Nitrite: x   Leuk Esterase: x / RBC: x / WBC x   Sq Epi: x / Non Sq Epi: x / Bacteria: x      ABG - ( 15 Feb 2024 00:54 )  pH, Arterial: 7.48  pH, Blood: x     /  pCO2: 37    /  pO2: 143   / HCO3: 28    / Base Excess: 3.8   /  SaO2: 99.4                                    6.8    8.41  )-----------( 367      ( 15 Feb 2024 02:00 )             22.2                         6.9    8.56  )-----------( 356      ( 15 Feb 2024 00:54 )             22.0                         7.2    9.07  )-----------( 379      ( 14 Feb 2024 01:50 )             22.9     CAPILLARY BLOOD GLUCOSE      POCT Blood Glucose.: 227 mg/dL (15 Feb 2024 04:56)  POCT Blood Glucose.: 220 mg/dL (14 Feb 2024 23:47)  POCT Blood Glucose.: 145 mg/dL (14 Feb 2024 17:12)  POCT Blood Glucose.: 130 mg/dL (14 Feb 2024 11:53)      RADIOLOGY & ADDITIONAL TESTS:    Imaging Personally Reviewed:  [x ] YES  [ ] NO    Consultants involved in case:   Consultant(s) Notes Reviewed:  [ x] YES  [ ] NO:   Care Discussed with Consultants/Other Providers [x ] YES  [ ] NO         ***************************************************************  Candel[facility] (Renee) Shantel PGY2  [facility]   ***************************************************************    SHAIK CHARANJIT  89y  MRN: [id number]    Patient is a 89y old  Male who presents with a chief complaint of COVID19, Chest pain (14 Feb 2024 17:06)      Subjective: Temp 100.4 overnight no other issues, remains sedated.     MEDICATIONS  (STANDING):  albuterol/ipratropium for Nebulization 3 milliLiter(s) Nebulizer every 6 hours  artificial  tears Solution 1 Drop(s) Left EYE four times a day  aspirin  chewable 81 milliGRAM(s) Enteral Tube daily  chlorhexidine 0.12% Liquid 15 milliLiter(s) Oral Mucosa every 12 hours  chlorhexidine 2% Cloths 1 Application(s) Topical daily  dextrose 5%. 1000 milliLiter(s) (100 mL/Hr) IV Continuous <Continuous>  dextrose 5%. 1000 milliLiter(s) (50 mL/Hr) IV Continuous <Continuous>  dextrose 50% Injectable 25 Gram(s) IV Push once  dextrose 50% Injectable 25 Gram(s) IV Push once  dextrose 50% Injectable 12.5 Gram(s) IV Push once  doxazosin 2 milliGRAM(s) Oral at bedtime  escitalopram 10 milliGRAM(s) Oral daily  glucagon  Injectable 1 milliGRAM(s) IntraMuscular once  insulin glargine Injectable (LANTUS) 12 Unit(s) SubCutaneous <User Schedule>  insulin lispro (ADMELOG) corrective regimen sliding scale   SubCutaneous every 6 hours  levETIRAcetam  IVPB 500 milliGRAM(s) IV Intermittent every 12 hours  midodrine 20 milliGRAM(s) Oral every 8 hours  mupirocin 2% Ointment 1 Application(s) Topical two times a day  norepinephrine Infusion 0.05 MICROgram(s)/kG/Min (3.72 mL/Hr) IV Continuous <Continuous>  pantoprazole  Injectable 40 milliGRAM(s) IV Push every 12 hours  petrolatum Ophthalmic Ointment 1 Application(s) Left EYE at bedtime  piperacillin/tazobactam IVPB.. 3.375 Gram(s) IV Intermittent every 8 hours  propofol Infusion 10.004 MICROgram(s)/kG/Min (4.76 mL/Hr) IV Continuous <Continuous>  sodium chloride 3%  Inhalation 4 milliLiter(s) Inhalation two times a day  sucralfate suspension 1 Gram(s) Enteral Tube two times a day  tranexamic acid Injectable for Topical Use 10 milliLiter(s) Topical once    MEDICATIONS  (PRN):  dextrose Oral Gel 15 Gram(s) Oral once PRN Blood Glucose LESS THAN 70 milliGRAM(s)/deciliter      Objective:    Vitals: Vital Signs Last 24 Hrs  T(C): 38 (02-15-24 @ 03:00), Max: 38 (02-15-24 @ 03:00)  T(F): 100.4 (02-15-24 @ 03:00), Max: 100.4 (02-15-24 @ 03:00)  HR: 92 (02-15-24 @ 07:00) (90 - 101)  BP: 114/41 (02-15-24 @ 07:00) (100/33 - 129/45)  BP(mean): 61 (02-15-24 @ 07:00) (50 - 67)  RR: 12 (02-15-24 @ 07:00) (12 - 22)  SpO2: 99% (02-15-24 @ 07:00) (99% - 100%)            I&O's Summary    14 Feb 2024 07:01  -  15 Feb 2024 07:00  --------------------------------------------------------  IN: 1757.2 mL / OUT: 2310 mL / NET: -552.8 mL        PHYSICAL EXAM:  GENERAL: NAD, intubated and sedated  HEAD:  Atraumatic, Normocephalic  EYES: EOMI, conjunctiva and sclera clear  CHEST/LUNG: Ventilator sounds  HEART: Regular rate and rhythm; No murmurs, rubs, or gallops  ABDOMEN: Soft, Nontender, Nondistended;   SKIN: No rashes or lesions  NERVOUS SYSTEM: AOx0, intubated and sedated    LABS:  02-15    139  |  99  |  23  ----------------------------<  212<H>  3.6   |  26  |  1.65<H>  02-14    139  |  101  |  22  ----------------------------<  133<H>  4.0   |  26  |  1.62<H>  02-14    139  |  101  |  24<H>  ----------------------------<  122<H>  3.8   |  29  |  1.63<H>    Ca    8.0<L>      15 Feb 2024 00:54  Ca    8.4      14 Feb 2024 14:45  Ca    8.2<L>      14 Feb 2024 01:50  Phos  3.2     02-15  Mg     1.80     02-15    TPro  6.1  /  Alb  2.1<L>  /  TBili  0.3  /  DBili  x   /  AST  33  /  ALT  21  /  AlkPhos  83  02-15  TPro  6.7  /  Alb  2.3<L>  /  TBili  0.4  /  DBili  x   /  AST  29  /  ALT  23  /  AlkPhos  81  02-14  TPro  6.4  /  Alb  2.3<L>  /  TBili  0.3  /  DBili  x   /  AST  22  /  ALT  21  /  AlkPhos  84  02-14      PT/INR - ( 15 Feb 2024 00:54 )   PT: 12.5 sec;   INR: 1.12 ratio         PTT - ( 15 Feb 2024 00:54 )  PTT:29.9 sec              Urinalysis Basic - ( 15 Feb 2024 00:54 )    Color: x / Appearance: x / SG: x / pH: x  Gluc: 212 mg/dL / Ketone: x  / Bili: x / Urobili: x   Blood: x / Protein: x / Nitrite: x   Leuk Esterase: x / RBC: x / WBC x   Sq Epi: x / Non Sq Epi: x / Bacteria: x      ABG - ( 15 Feb 2024 00:54 )  pH, Arterial: 7.48  pH, Blood: x     /  pCO2: 37    /  pO2: 143   / HCO3: 28    / Base Excess: 3.8   /  SaO2: 99.4                                    6.8    8.41  )-----------( 367      ( 15 Feb 2024 02:00 )             22.2                         6.9    8.56  )-----------( 356      ( 15 Feb 2024 00:54 )             22.0                         7.2    9.07  )-----------( 379      ( 14 Feb 2024 01:50 )             22.9     CAPILLARY BLOOD GLUCOSE      POCT Blood Glucose.: 227 mg/dL (15 Feb 2024 04:56)  POCT Blood Glucose.: 220 mg/dL (14 Feb 2024 23:47)  POCT Blood Glucose.: 145 mg/dL (14 Feb 2024 17:12)  POCT Blood Glucose.: 130 mg/dL (14 Feb 2024 11:53)      RADIOLOGY & ADDITIONAL TESTS:    Imaging Personally Reviewed:  [x ] YES  [ ] NO    Consultants involved in case:   Consultant(s) Notes Reviewed:  [ x] YES  [ ] NO:   Care Discussed with Consultants/Other Providers [x ] YES  [ ] NO

## 2024-02-15 NOTE — PROGRESS NOTE ADULT - SUBJECTIVE AND OBJECTIVE BOX
CHIEF COMPLAINT:Patient is a 89y old  Male who presents with a chief complaint of COVID19, Chest pain (15 Feb 2024 16:05)      Interval Events:  s/p trach 2/13  ongoing PSV trials    REVIEW OF SYSTEMS:  [x] All other systems negative except per HPI   [ ] Unable to assess ROS because ________    OBJECTIVE:  ICU Vital Signs Last 24 Hrs  T(C): 37.3 (15 Feb 2024 20:00), Max: 38 (15 Feb 2024 03:00)  T(F): 99.2 (15 Feb 2024 20:00), Max: 100.4 (15 Feb 2024 03:00)  HR: 89 (15 Feb 2024 20:00) (89 - 102)  BP: 102/40 (15 Feb 2024 20:00) (92/34 - 125/50)  BP(mean): 56 (15 Feb 2024 20:00) (49 - 71)  ABP: --  ABP(mean): --  RR: 12 (15 Feb 2024 20:00) (12 - 22)  SpO2: 98% (15 Feb 2024 20:00) (97% - 100%)    O2 Parameters below as of 15 Feb 2024 20:00  Patient On (Oxygen Delivery Method): ventilator    O2 Concentration (%): 30      Mode: AC/ CMV (Assist Control/ Continuous Mandatory Ventilation), RR (machine): 12, TV (machine): 450, FiO2: 30, PEEP: 5, ITime: 0.8, MAP: 10, PIP: 33    02-14 @ 07:01  -  02-15 @ 07:00  --------------------------------------------------------  IN: 1757.2 mL / OUT: 2310 mL / NET: -552.8 mL    02-15 @ 07:01  -  02-15 @ 21:53  --------------------------------------------------------  IN: 1347.5 mL / OUT: 1345 mL / NET: 2.5 mL        PHYSICAL EXAM:  GENERAL: NAD  HEAD:  Atraumatic, Normocephalic  EYES: EOMI, conjunctiva and sclera clear  ENT: tracheostomy tube in place, site c/d/i  CHEST/LUNG: mechanical bs  HEART: Regular rate and rhythm; No murmurs, rubs, or gallops  ABDOMEN: Soft, Nontender, Nondistended;   SKIN: No rashes or lesions  NERVOUS SYSTEM:  sedated      HOSPITAL MEDICATIONS:  MEDICATIONS  (STANDING):  albuterol/ipratropium for Nebulization 3 milliLiter(s) Nebulizer every 6 hours  artificial  tears Solution 1 Drop(s) Left EYE four times a day  aspirin  chewable 81 milliGRAM(s) Enteral Tube daily  chlorhexidine 0.12% Liquid 15 milliLiter(s) Oral Mucosa every 12 hours  chlorhexidine 2% Cloths 1 Application(s) Topical daily  dextrose 5%. 1000 milliLiter(s) (50 mL/Hr) IV Continuous <Continuous>  dextrose 5%. 1000 milliLiter(s) (100 mL/Hr) IV Continuous <Continuous>  dextrose 50% Injectable 25 Gram(s) IV Push once  dextrose 50% Injectable 12.5 Gram(s) IV Push once  dextrose 50% Injectable 25 Gram(s) IV Push once  doxazosin 2 milliGRAM(s) Oral at bedtime  escitalopram 10 milliGRAM(s) Oral daily  glucagon  Injectable 1 milliGRAM(s) IntraMuscular once  insulin glargine Injectable (LANTUS) 12 Unit(s) SubCutaneous <User Schedule>  insulin lispro (ADMELOG) corrective regimen sliding scale   SubCutaneous every 6 hours  levETIRAcetam  IVPB 500 milliGRAM(s) IV Intermittent every 12 hours  midodrine 30 milliGRAM(s) Oral every 8 hours  mupirocin 2% Ointment 1 Application(s) Topical two times a day  norepinephrine Infusion 0.05 MICROgram(s)/kG/Min (3.72 mL/Hr) IV Continuous <Continuous>  pantoprazole  Injectable 40 milliGRAM(s) IV Push every 12 hours  petrolatum Ophthalmic Ointment 1 Application(s) Left EYE at bedtime  piperacillin/tazobactam IVPB.. 3.375 Gram(s) IV Intermittent every 8 hours  propofol Infusion 10.004 MICROgram(s)/kG/Min (4.76 mL/Hr) IV Continuous <Continuous>  sodium chloride 3%  Inhalation 4 milliLiter(s) Inhalation two times a day  sucralfate suspension 1 Gram(s) Enteral Tube two times a day  tranexamic acid Injectable for Topical Use 10 milliLiter(s) Topical once    MEDICATIONS  (PRN):  dextrose Oral Gel 15 Gram(s) Oral once PRN Blood Glucose LESS THAN 70 milliGRAM(s)/deciliter      LABS:    The Labs were reviewed by me   The Radiology was reviewed by me    EKG tracing reviewed by me    02-15    139  |  99  |  23  ----------------------------<  212<H>  3.6   |  26  |  1.65<H>  02-14    139  |  101  |  22  ----------------------------<  133<H>  4.0   |  26  |  1.62<H>  02-14    139  |  101  |  24<H>  ----------------------------<  122<H>  3.8   |  29  |  1.63<H>    Ca    8.0<L>      15 Feb 2024 00:54  Ca    8.4      14 Feb 2024 14:45  Ca    8.2<L>      14 Feb 2024 01:50  Phos  3.2     02-15  Mg     1.80     02-15    TPro  6.1  /  Alb  2.1<L>  /  TBili  0.3  /  DBili  x   /  AST  33  /  ALT  21  /  AlkPhos  83  02-15  TPro  6.7  /  Alb  2.3<L>  /  TBili  0.4  /  DBili  x   /  AST  29  /  ALT  23  /  AlkPhos  81  02-14  TPro  6.4  /  Alb  2.3<L>  /  TBili  0.3  /  DBili  x   /  AST  22  /  ALT  21  /  AlkPhos  84  02-14    Magnesium: 1.80 mg/dL (02-15-24 @ 00:54)  Magnesium: 2.00 mg/dL (02-14-24 @ 01:50)  Magnesium: 2.10 mg/dL (02-13-24 @ 01:43)    Phosphorus: 3.2 mg/dL (02-15-24 @ 00:54)  Phosphorus: 4.1 mg/dL (02-14-24 @ 01:50)  Phosphorus: 3.5 mg/dL (02-13-24 @ 01:43)      PT/INR - ( 15 Feb 2024 00:54 )   PT: 12.5 sec;   INR: 1.12 ratio         PTT - ( 15 Feb 2024 00:54 )  PTT:29.9 sec              Urinalysis Basic - ( 15 Feb 2024 00:54 )    Color: x / Appearance: x / SG: x / pH: x  Gluc: 212 mg/dL / Ketone: x  / Bili: x / Urobili: x   Blood: x / Protein: x / Nitrite: x   Leuk Esterase: x / RBC: x / WBC x   Sq Epi: x / Non Sq Epi: x / Bacteria: x      ABG - ( 15 Feb 2024 00:54 )  pH, Arterial: 7.48  pH, Blood: x     /  pCO2: 37    /  pO2: 143   / HCO3: 28    / Base Excess: 3.8   /  SaO2: 99.4                                    8.6    9.09  )-----------( 327      ( 15 Feb 2024 10:00 )             26.7                         6.8    8.41  )-----------( 367      ( 15 Feb 2024 02:00 )             22.2                         6.9    8.56  )-----------( 356      ( 15 Feb 2024 00:54 )             22.0     CAPILLARY BLOOD GLUCOSE      POCT Blood Glucose.: 173 mg/dL (15 Feb 2024 17:13)  POCT Blood Glucose.: 149 mg/dL (15 Feb 2024 11:30)  POCT Blood Glucose.: 227 mg/dL (15 Feb 2024 04:56)  POCT Blood Glucose.: 220 mg/dL (14 Feb 2024 23:47)        MICROBIOLOGY:     RADIOLOGY:  [ ] Reviewed and interpreted by me    Point of Care Ultrasound Findings:    PFT:    EKG:

## 2024-02-15 NOTE — PROGRESS NOTE ADULT - ASSESSMENT
89-yo M w/ PMH of CAD s/p CABG w/ stents, s/p PPM, DM, PVD, CVA, CKD, and myoclonic jerks, admitted on 12/23 i/s/o recent COVID.   CT C/A/P revealing findings suggestive of acute cholecystitis (12/24/23)  S/p exlap, mesenteric angiogram, and SMA stent (12/24)  HIDA (12/29) supported the diagnosis of acute cholecystitis. Treated medically (Zosyn 12/24-31)  LUE hematoma noted 1/10 on venous Duplex, w/ visualization of complex, avascular fluid collection (5.3 x 2.7 x 3.7 cm), possibly hematoma  Perc asa on 1/26  Given course of meropenem and stopped  On 2/12 restarted on Zosyn for episode of fever  LFTs WNL  S/p Trach 2/13  Now with intermittent fevers, plan for PEG--GI holding on PEG in setting fever  2/13 Bronch studies NGTD  BCXs 2/11 NGTD  WBC has been normal  Unclear source fever at this point in a patient with complex hospital course (occult infection? noninfectious reactive to stressors?)  Overall, Fever, cholecystitis s/p cholecystostomy  - Continue empiric Zosyn 3.375g q 8 for now  - F/U BCXs  - Monitor fever curve, monitor for alternate signs infection  - Timing PEG per GI  - Further workup depending on clinical progression    Rangel Bello MD  Contact on TEAMS messaging from 9am - 5pm  From 5pm-9am, on weekends, or if no response call [phone]

## 2024-02-15 NOTE — PROGRESS NOTE ADULT - NS ATTEND AMEND GEN_ALL_CORE FT
PEG deferred for today given new fevers and worsening anemia. Patient with ongoing levophed requirements (but stable). Ideally, would plan for an elective procedure such as PEG placement when patient is at his most optimal clinical status to lower periprocedural complications and to avoid confounding underlying processes such as an infection. Team will re-evaluate for PEG placement once afebrile for 48h, tentatively Tuesday. Please continue with alternative means of nutrition in the interim.

## 2024-02-16 LAB
ALBUMIN SERPL ELPH-MCNC: 2.2 G/DL — LOW (ref 3.3–5)
ALP SERPL-CCNC: 86 U/L — SIGNIFICANT CHANGE UP (ref 40–120)
ALT FLD-CCNC: 21 U/L — SIGNIFICANT CHANGE UP (ref 4–41)
ANION GAP SERPL CALC-SCNC: 14 MMOL/L — SIGNIFICANT CHANGE UP (ref 7–14)
APTT BLD: 30.5 SEC — SIGNIFICANT CHANGE UP (ref 24.5–35.6)
AST SERPL-CCNC: 28 U/L — SIGNIFICANT CHANGE UP (ref 4–40)
BASOPHILS # BLD AUTO: 0.04 K/UL — SIGNIFICANT CHANGE UP (ref 0–0.2)
BASOPHILS NFR BLD AUTO: 0.5 % — SIGNIFICANT CHANGE UP (ref 0–2)
BILIRUB SERPL-MCNC: 0.3 MG/DL — SIGNIFICANT CHANGE UP (ref 0.2–1.2)
BLOOD GAS ARTERIAL - LYTES,HGB,ICA,LACT RESULT: SIGNIFICANT CHANGE UP
BUN SERPL-MCNC: 23 MG/DL — SIGNIFICANT CHANGE UP (ref 7–23)
CALCIUM SERPL-MCNC: 7.9 MG/DL — LOW (ref 8.4–10.5)
CHLORIDE SERPL-SCNC: 99 MMOL/L — SIGNIFICANT CHANGE UP (ref 98–107)
CO2 SERPL-SCNC: 26 MMOL/L — SIGNIFICANT CHANGE UP (ref 22–31)
CREAT SERPL-MCNC: 1.67 MG/DL — HIGH (ref 0.5–1.3)
CULTURE RESULTS: SIGNIFICANT CHANGE UP
CULTURE RESULTS: SIGNIFICANT CHANGE UP
EGFR: 39 ML/MIN/1.73M2 — LOW
EOSINOPHIL # BLD AUTO: 1.13 K/UL — HIGH (ref 0–0.5)
EOSINOPHIL NFR BLD AUTO: 14.4 % — HIGH (ref 0–6)
GLUCOSE BLDC GLUCOMTR-MCNC: 124 MG/DL — HIGH (ref 70–99)
GLUCOSE BLDC GLUCOMTR-MCNC: 198 MG/DL — HIGH (ref 70–99)
GLUCOSE BLDC GLUCOMTR-MCNC: 228 MG/DL — HIGH (ref 70–99)
GLUCOSE BLDC GLUCOMTR-MCNC: 233 MG/DL — HIGH (ref 70–99)
GLUCOSE SERPL-MCNC: 184 MG/DL — HIGH (ref 70–99)
HCT VFR BLD CALC: 24.8 % — LOW (ref 39–50)
HGB BLD-MCNC: 8.1 G/DL — LOW (ref 13–17)
IANC: 4.66 K/UL — SIGNIFICANT CHANGE UP (ref 1.8–7.4)
IMM GRANULOCYTES NFR BLD AUTO: 0.9 % — SIGNIFICANT CHANGE UP (ref 0–0.9)
INR BLD: 1.1 RATIO — SIGNIFICANT CHANGE UP (ref 0.85–1.18)
LYMPHOCYTES # BLD AUTO: 1.45 K/UL — SIGNIFICANT CHANGE UP (ref 1–3.3)
LYMPHOCYTES # BLD AUTO: 18.5 % — SIGNIFICANT CHANGE UP (ref 13–44)
MAGNESIUM SERPL-MCNC: 2.1 MG/DL — SIGNIFICANT CHANGE UP (ref 1.6–2.6)
MCHC RBC-ENTMCNC: 29.1 PG — SIGNIFICANT CHANGE UP (ref 27–34)
MCHC RBC-ENTMCNC: 32.7 GM/DL — SIGNIFICANT CHANGE UP (ref 32–36)
MCV RBC AUTO: 89.2 FL — SIGNIFICANT CHANGE UP (ref 80–100)
MONOCYTES # BLD AUTO: 0.5 K/UL — SIGNIFICANT CHANGE UP (ref 0–0.9)
MONOCYTES NFR BLD AUTO: 6.4 % — SIGNIFICANT CHANGE UP (ref 2–14)
NEUTROPHILS # BLD AUTO: 4.66 K/UL — SIGNIFICANT CHANGE UP (ref 1.8–7.4)
NEUTROPHILS NFR BLD AUTO: 59.3 % — SIGNIFICANT CHANGE UP (ref 43–77)
NRBC # BLD: 0 /100 WBCS — SIGNIFICANT CHANGE UP (ref 0–0)
NRBC # FLD: 0 K/UL — SIGNIFICANT CHANGE UP (ref 0–0)
PHOSPHATE SERPL-MCNC: 3.6 MG/DL — SIGNIFICANT CHANGE UP (ref 2.5–4.5)
PLATELET # BLD AUTO: 318 K/UL — SIGNIFICANT CHANGE UP (ref 150–400)
POTASSIUM SERPL-MCNC: 3.7 MMOL/L — SIGNIFICANT CHANGE UP (ref 3.5–5.3)
POTASSIUM SERPL-SCNC: 3.7 MMOL/L — SIGNIFICANT CHANGE UP (ref 3.5–5.3)
PROT SERPL-MCNC: 6.3 G/DL — SIGNIFICANT CHANGE UP (ref 6–8.3)
PROTHROM AB SERPL-ACNC: 12.4 SEC — SIGNIFICANT CHANGE UP (ref 9.5–13)
RBC # BLD: 2.78 M/UL — LOW (ref 4.2–5.8)
RBC # FLD: 18.1 % — HIGH (ref 10.3–14.5)
SODIUM SERPL-SCNC: 139 MMOL/L — SIGNIFICANT CHANGE UP (ref 135–145)
SPECIMEN SOURCE: SIGNIFICANT CHANGE UP
SPECIMEN SOURCE: SIGNIFICANT CHANGE UP
WBC # BLD: 7.85 K/UL — SIGNIFICANT CHANGE UP (ref 3.8–10.5)
WBC # FLD AUTO: 7.85 K/UL — SIGNIFICANT CHANGE UP (ref 3.8–10.5)

## 2024-02-16 PROCEDURE — 99232 SBSQ HOSP IP/OBS MODERATE 35: CPT

## 2024-02-16 RX ORDER — HEPARIN SODIUM 5000 [USP'U]/ML
5000 INJECTION INTRAVENOUS; SUBCUTANEOUS EVERY 8 HOURS
Refills: 0 | Status: DISCONTINUED | OUTPATIENT
Start: 2024-02-16 | End: 2024-02-16

## 2024-02-16 RX ORDER — FUROSEMIDE 40 MG
40 TABLET ORAL ONCE
Refills: 0 | Status: DISCONTINUED | OUTPATIENT
Start: 2024-02-16 | End: 2024-02-16

## 2024-02-16 RX ORDER — POTASSIUM CHLORIDE 20 MEQ
40 PACKET (EA) ORAL ONCE
Refills: 0 | Status: COMPLETED | OUTPATIENT
Start: 2024-02-16 | End: 2024-02-16

## 2024-02-16 RX ORDER — ACETAMINOPHEN 500 MG
1000 TABLET ORAL ONCE
Refills: 0 | Status: COMPLETED | OUTPATIENT
Start: 2024-02-16 | End: 2024-02-16

## 2024-02-16 RX ORDER — HEPARIN SODIUM 5000 [USP'U]/ML
5000 INJECTION INTRAVENOUS; SUBCUTANEOUS EVERY 8 HOURS
Refills: 0 | Status: DISCONTINUED | OUTPATIENT
Start: 2024-02-16 | End: 2024-02-17

## 2024-02-16 RX ADMIN — Medication 3 MILLILITER(S): at 03:17

## 2024-02-16 RX ADMIN — SODIUM CHLORIDE 4 MILLILITER(S): 9 INJECTION INTRAMUSCULAR; INTRAVENOUS; SUBCUTANEOUS at 21:44

## 2024-02-16 RX ADMIN — MUPIROCIN 1 APPLICATION(S): 20 OINTMENT TOPICAL at 05:08

## 2024-02-16 RX ADMIN — CHLORHEXIDINE GLUCONATE 15 MILLILITER(S): 213 SOLUTION TOPICAL at 17:05

## 2024-02-16 RX ADMIN — INSULIN GLARGINE 12 UNIT(S): 100 INJECTION, SOLUTION SUBCUTANEOUS at 11:17

## 2024-02-16 RX ADMIN — PIPERACILLIN AND TAZOBACTAM 25 GRAM(S): 4; .5 INJECTION, POWDER, LYOPHILIZED, FOR SOLUTION INTRAVENOUS at 01:03

## 2024-02-16 RX ADMIN — Medication 1 GRAM(S): at 17:05

## 2024-02-16 RX ADMIN — Medication 1 DROP(S): at 17:04

## 2024-02-16 RX ADMIN — Medication 2: at 05:08

## 2024-02-16 RX ADMIN — SODIUM CHLORIDE 4 MILLILITER(S): 9 INJECTION INTRAMUSCULAR; INTRAVENOUS; SUBCUTANEOUS at 09:57

## 2024-02-16 RX ADMIN — HEPARIN SODIUM 5000 UNIT(S): 5000 INJECTION INTRAVENOUS; SUBCUTANEOUS at 22:52

## 2024-02-16 RX ADMIN — ESCITALOPRAM OXALATE 10 MILLIGRAM(S): 10 TABLET, FILM COATED ORAL at 11:16

## 2024-02-16 RX ADMIN — Medication 1 DROP(S): at 00:00

## 2024-02-16 RX ADMIN — MIDODRINE HYDROCHLORIDE 30 MILLIGRAM(S): 2.5 TABLET ORAL at 22:42

## 2024-02-16 RX ADMIN — LEVETIRACETAM 400 MILLIGRAM(S): 250 TABLET, FILM COATED ORAL at 05:07

## 2024-02-16 RX ADMIN — Medication 1 DROP(S): at 05:08

## 2024-02-16 RX ADMIN — PIPERACILLIN AND TAZOBACTAM 25 GRAM(S): 4; .5 INJECTION, POWDER, LYOPHILIZED, FOR SOLUTION INTRAVENOUS at 17:45

## 2024-02-16 RX ADMIN — CHLORHEXIDINE GLUCONATE 15 MILLILITER(S): 213 SOLUTION TOPICAL at 05:07

## 2024-02-16 RX ADMIN — Medication 1 DROP(S): at 11:16

## 2024-02-16 RX ADMIN — Medication 4: at 17:21

## 2024-02-16 RX ADMIN — HEPARIN SODIUM 5000 UNIT(S): 5000 INJECTION INTRAVENOUS; SUBCUTANEOUS at 14:16

## 2024-02-16 RX ADMIN — Medication 2 MILLIGRAM(S): at 22:43

## 2024-02-16 RX ADMIN — Medication 4: at 11:18

## 2024-02-16 RX ADMIN — PANTOPRAZOLE SODIUM 40 MILLIGRAM(S): 20 TABLET, DELAYED RELEASE ORAL at 17:04

## 2024-02-16 RX ADMIN — Medication 3 MILLILITER(S): at 15:52

## 2024-02-16 RX ADMIN — LEVETIRACETAM 400 MILLIGRAM(S): 250 TABLET, FILM COATED ORAL at 17:14

## 2024-02-16 RX ADMIN — CHLORHEXIDINE GLUCONATE 1 APPLICATION(S): 213 SOLUTION TOPICAL at 11:17

## 2024-02-16 RX ADMIN — Medication 81 MILLIGRAM(S): at 11:16

## 2024-02-16 RX ADMIN — PANTOPRAZOLE SODIUM 40 MILLIGRAM(S): 20 TABLET, DELAYED RELEASE ORAL at 05:07

## 2024-02-16 RX ADMIN — Medication 40 MILLIEQUIVALENT(S): at 10:48

## 2024-02-16 RX ADMIN — MIDODRINE HYDROCHLORIDE 30 MILLIGRAM(S): 2.5 TABLET ORAL at 05:07

## 2024-02-16 RX ADMIN — Medication 1 APPLICATION(S): at 22:53

## 2024-02-16 RX ADMIN — Medication 400 MILLIGRAM(S): at 17:04

## 2024-02-16 RX ADMIN — Medication 3 MILLILITER(S): at 21:44

## 2024-02-16 RX ADMIN — PIPERACILLIN AND TAZOBACTAM 25 GRAM(S): 4; .5 INJECTION, POWDER, LYOPHILIZED, FOR SOLUTION INTRAVENOUS at 10:03

## 2024-02-16 RX ADMIN — MUPIROCIN 1 APPLICATION(S): 20 OINTMENT TOPICAL at 17:04

## 2024-02-16 RX ADMIN — Medication 1 GRAM(S): at 05:07

## 2024-02-16 RX ADMIN — Medication 3 MILLILITER(S): at 09:58

## 2024-02-16 RX ADMIN — Medication 1000 MILLIGRAM(S): at 18:00

## 2024-02-16 RX ADMIN — MIDODRINE HYDROCHLORIDE 30 MILLIGRAM(S): 2.5 TABLET ORAL at 14:16

## 2024-02-16 NOTE — PROGRESS NOTE ADULT - SUBJECTIVE AND OBJECTIVE BOX
NEPHROLOGY    Patient seen and examined trach to sindi loya this morning, in no acute distress.     MEDICATIONS  (STANDING):  albuterol/ipratropium for Nebulization 3 milliLiter(s) Nebulizer every 6 hours  artificial  tears Solution 1 Drop(s) Left EYE four times a day  aspirin  chewable 81 milliGRAM(s) Enteral Tube daily  chlorhexidine 0.12% Liquid 15 milliLiter(s) Oral Mucosa every 12 hours  chlorhexidine 2% Cloths 1 Application(s) Topical daily  dextrose 5%. 1000 milliLiter(s) (100 mL/Hr) IV Continuous <Continuous>  dextrose 5%. 1000 milliLiter(s) (50 mL/Hr) IV Continuous <Continuous>  dextrose 50% Injectable 25 Gram(s) IV Push once  dextrose 50% Injectable 25 Gram(s) IV Push once  dextrose 50% Injectable 12.5 Gram(s) IV Push once  doxazosin 2 milliGRAM(s) Oral at bedtime  escitalopram 10 milliGRAM(s) Oral daily  glucagon  Injectable 1 milliGRAM(s) IntraMuscular once  heparin   Injectable 5000 Unit(s) SubCutaneous every 8 hours  insulin glargine Injectable (LANTUS) 12 Unit(s) SubCutaneous <User Schedule>  insulin lispro (ADMELOG) corrective regimen sliding scale   SubCutaneous every 6 hours  levETIRAcetam  IVPB 500 milliGRAM(s) IV Intermittent every 12 hours  midodrine 30 milliGRAM(s) Oral every 8 hours  mupirocin 2% Ointment 1 Application(s) Topical two times a day  pantoprazole  Injectable 40 milliGRAM(s) IV Push every 12 hours  petrolatum Ophthalmic Ointment 1 Application(s) Left EYE at bedtime  piperacillin/tazobactam IVPB.. 3.375 Gram(s) IV Intermittent every 8 hours  sodium chloride 3%  Inhalation 4 milliLiter(s) Inhalation two times a day  sucralfate suspension 1 Gram(s) Enteral Tube two times a day  tranexamic acid Injectable for Topical Use 10 milliLiter(s) Topical once    VITALS:  T(C): , Max: 37.8 (02-16-24 @ 08:00)  T(F): , Max: 100 (02-16-24 @ 08:00)  HR: 106 (02-16-24 @ 14:00)  BP: 111/49 (02-16-24 @ 14:00)  BP(mean): 65 (02-16-24 @ 14:00)  RR: 13 (02-16-24 @ 14:00)  SpO2: 97% (02-16-24 @ 14:00)    I and O's:    02-15 @ 07:01  -  02-16 @ 07:00  --------------------------------------------------------  IN: 2098.1 mL / OUT: 1780 mL / NET: 318.1 mL    02-16 @ 07:01  -  02-16 @ 14:39  --------------------------------------------------------  IN: 325 mL / OUT: 170 mL / NET: 155 mL    PHYSICAL EXAM:  Constitutional: NAD  HEENT: +trach, + NGT  Respiratory: coarse bs   Cardiovascular: tachy s1s2  Gastrointestinal: BS+, soft, NT/ND  Extremities:+ peripheral edema  : no delong  Skin: No rashes    LABS:                        8.1    7.85  )-----------( 318      ( 16 Feb 2024 00:30 )             24.8     02-16    139  |  99  |  23  ----------------------------<  184<H>  3.7   |  26  |  1.67<H>    Ca    7.9<L>      16 Feb 2024 00:30  Phos  3.6     02-16  Mg     2.10     02-16    TPro  6.3  /  Alb  2.2<L>  /  TBili  0.3  /  DBili  x   /  AST  28  /  ALT  21  /  AlkPhos  86  02-16

## 2024-02-16 NOTE — PROGRESS NOTE ADULT - ASSESSMENT
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     PLAN  =====Neurologic=====  #CVA  - prior CVA x3 with no residual deficits    #myoclonic jerks  - on Keppra 500 mg BID, per neuro wishing to wean however per family request will continue at 500 mg BID, but now off valproate per neuro    - also per neuro, use precedex as needed for clinical sedation    - Started prop on 2/10, in anticipation of procedure  - holding home gabapentin and memantine in setting of somnolence  - continue lexapro     #AMS  - CT/CTA negative for stroke. EEG now off   - MRI brain - unremarkable findings. Family now amenable to Trach/PEG.     =====Pulmonary=====    #COVID  - s/p paxlovid and 1 dose remdesivir while inpatient    #Pleural effusions b/l  - CT chest from 1/16 showing new small bilateral pleural effusions/ atelectasis/ patchy bilateral ground-glass opacities are consistent with pulmonary edema.  - Diuresis PRN based on amount of pleural effusions on POCUS    #AHRF  - Intubated for airway protection in s/o AMS. Sedated  - Holding brilinta for possible procedure. Will need to restart ASAP after trach/PEG  - s/p zosyn for aspiration PNA from 1/20- 1/28   - MRSA Swab positive for Staph Aureus only, started on Mupirocin 2/13-  - Restarted on Zosyn. BAL culture negative  - Trach 2/13/2024.     =====Cardiovascular=====  Midodrine 30 q8h, wean prop as tolerated to wean pressors    #HTN  - on home amlodipine and metoprolol currently holding     #CAD  - on Brilinta (holding) aspirin and ranolazine continue   - as per vascular okay to hold Brilinta for possible pleural effusion thoracentesis  -have not heard back from cardiology regarding Brilinta / last stent 2022 , will hold   -Need to restart brilinta ASAP after Trach/PEG    #Afib  - pt with known history of pAF, first observed 2/1  - can consider starting AC and/or rate control if persists     =====GI=====  #upper GI bleed  - s/p EGD showing dieulafoy lesion and esophagitis likely 2/2 NGT irritation s/p 2 clips  - on protonix IV BID continue   - CT on 1/25 with cholelithiasis and sludge HIDA on 1/26 confirming likely acute choley, s/p IR perc cholecystostomy 1/26   - Discussed w/ family, want trach/PEG. Discussed w/ GI - Only will do PEG 24-48 hours after trach placed. Recommend reaching out when trach date is set  - Trach 2/13, per GI PEG only after afebrile or ID clearance for "noninfectious fever" given intermittent fevers. Reached out to ID.     #Diet  - tube feeds NGT   - Per GI, no PEG today but unclear when GI will place PEG, will get back re: timing    =====Renal/=====  #Electrolytes  - Maintain K>4, Phos>3, Mag>2, iCal>1  - baseline cr 1.5, at baseline  - on free water flushes for hyperNa. Trend BMP/Na - adjsted to 200 q12h   - Diuresis PRN - Will give 40 mg IVP Lasix 2/15.   - TOV when trach/PEG completed    =====Endocrine=====  #DM2  - on lantus 12U and q6 SSI  - a1c 9.3, glucose wnl inpatient     =====Infectious Disease=====  #COVID   - s/p paxlovid and 1 dose remdesivir  # PNA  - CT chest showing ground glass opacities  - s/p zosyn  - intermittent episodes of fevers, likely source GI given choleycystitis? culture NGTD  - meropenem 5d course thru 2/1   - now monitoring off antibiotics  - newly hypothermic on 2/11, on Zosyn, unclear source, work-up unrevealing, plan for 7-day course    =====Heme/Onc=====  #DVT Ppx  - heparin sub-q held in s/o procedure, restart when able    =====Ethics=====  FULL CODE.  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     PLAN  =====Neurologic=====  #CVA  - prior CVA x3 with no residual deficits    #myoclonic jerks  - on Keppra 500 mg BID, per neuro wishing to wean however per family request will continue at 500 mg BID, but now off valproate per neuro    - also per neuro, use precedex as needed for clinical sedation    - Started prop on 2/10, in anticipation of procedure  - holding home gabapentin and memantine in setting of somnolence  - continue lexapro     #AMS  - CT/CTA negative for stroke. EEG now off   - MRI brain - unremarkable findings. Family now amenable to Trach/PEG.     =====Pulmonary=====    #COVID  - s/p paxlovid and 1 dose remdesivir while inpatient    #Pleural effusions b/l  - CT chest from 1/16 showing new small bilateral pleural effusions/ atelectasis/ patchy bilateral ground-glass opacities are consistent with pulmonary edema.  - Diuresis PRN based on amount of pleural effusions on POCUS    #AHRF  - Intubated for airway protection in s/o AMS. Sedated  - Holding brilinta for possible procedure. Will need to restart ASAP after trach/PEG  - s/p zosyn for aspiration PNA from 1/20- 1/28   - MRSA Swab positive for Staph Aureus only, started on Mupirocin 2/13-  - Restarted on Zosyn. BAL culture negative  - Trach 2/13/2024.     =====Cardiovascular=====  Midodrine 30 q8h, wean prop as tolerated to wean pressors    #HTN  - on home amlodipine and metoprolol currently holding     #CAD  - on Brilinta (holding) aspirin and ranolazine continue   - as per vascular okay to hold Brilinta for possible pleural effusion thoracentesis  -have not heard back from cardiology regarding Brilinta / last stent 2022 , will hold   -Need to restart brilinta ASAP after Trach/PEG    #Afib  - pt with known history of pAF, first observed 2/1  - can consider starting AC and/or rate control if persists     =====GI=====  #upper GI bleed  - s/p EGD showing dieulafoy lesion and esophagitis likely 2/2 NGT irritation s/p 2 clips  - on protonix IV BID continue   - CT on 1/25 with cholelithiasis and sludge HIDA on 1/26 confirming likely acute choley, s/p IR perc cholecystostomy 1/26   - Discussed w/ family, want trach/PEG. Discussed w/ GI - Only will do PEG 24-48 hours after trach placed. Recommend reaching out when trach date is set  - Trach 2/13, per GI PEG only after afebrile x48 hours. Next eval would be Tuesday, 2/20. Family demanding second opinion and frustrated w/ inability to place PEG, reached out to GI re: second opinion or private to see patient for PEG.     #Diet  - tube feeds NGT   - Per GI, no PEG today but unclear when GI will place PEG, will get back re: timing    =====Renal/=====  #Electrolytes  - Maintain K>4, Phos>3, Mag>2, iCal>1  - baseline cr 1.5, at baseline  - on free water flushes for hyperNa  - Diuresis PRN   - TOV 2/16    =====Endocrine=====  #DM2  - on lantus 12U and q6 SSI  - a1c 9.3, glucose wnl inpatient     =====Infectious Disease=====  #COVID   - s/p paxlovid and 1 dose remdesivir  # PNA  - CT chest showing ground glass opacities  - s/p zosyn  - intermittent episodes of fevers, likely source GI given choleycystitis? culture NGTD  - meropenem 5d course thru 2/1   - now monitoring off antibiotics  - newly hypothermic on 2/11, on Zosyn, unclear source, work-up unrevealing, plan for 7-day course    =====Heme/Onc=====  #DVT Ppx  - heparin sub-q held in s/o procedure, restart when able    =====Ethics=====  FULL CODE.  After family discussion, Trach/PEG

## 2024-02-16 NOTE — PROGRESS NOTE ADULT - SUBJECTIVE AND OBJECTIVE BOX
***************************************************************  Candealrio (Renee) Shantel PGY2  [facility]  ***************************************************************    SHAIK CHARANJIT  89y  MRN: [id number]    Patient is a 89y old  Male who presents with a chief complaint of COVID19, Chest pain (15 Feb 2024 21:52)      Subjective: no events ON. Denies fever, CP, SOB, abn pain, N/V, dysuria. Tolerating diet.      MEDICATIONS  (STANDING):  albuterol/ipratropium for Nebulization 3 milliLiter(s) Nebulizer every 6 hours  artificial  tears Solution 1 Drop(s) Left EYE four times a day  aspirin  chewable 81 milliGRAM(s) Enteral Tube daily  chlorhexidine 0.12% Liquid 15 milliLiter(s) Oral Mucosa every 12 hours  chlorhexidine 2% Cloths 1 Application(s) Topical daily  dextrose 5%. 1000 milliLiter(s) (100 mL/Hr) IV Continuous <Continuous>  dextrose 5%. 1000 milliLiter(s) (50 mL/Hr) IV Continuous <Continuous>  dextrose 50% Injectable 25 Gram(s) IV Push once  dextrose 50% Injectable 12.5 Gram(s) IV Push once  dextrose 50% Injectable 25 Gram(s) IV Push once  doxazosin 2 milliGRAM(s) Oral at bedtime  escitalopram 10 milliGRAM(s) Oral daily  glucagon  Injectable 1 milliGRAM(s) IntraMuscular once  insulin glargine Injectable (LANTUS) 12 Unit(s) SubCutaneous <User Schedule>  insulin lispro (ADMELOG) corrective regimen sliding scale   SubCutaneous every 6 hours  levETIRAcetam  IVPB 500 milliGRAM(s) IV Intermittent every 12 hours  midodrine 30 milliGRAM(s) Oral every 8 hours  mupirocin 2% Ointment 1 Application(s) Topical two times a day  norepinephrine Infusion 0.05 MICROgram(s)/kG/Min (3.72 mL/Hr) IV Continuous <Continuous>  pantoprazole  Injectable 40 milliGRAM(s) IV Push every 12 hours  petrolatum Ophthalmic Ointment 1 Application(s) Left EYE at bedtime  piperacillin/tazobactam IVPB.. 3.375 Gram(s) IV Intermittent every 8 hours  propofol Infusion 10.004 MICROgram(s)/kG/Min (4.76 mL/Hr) IV Continuous <Continuous>  sodium chloride 3%  Inhalation 4 milliLiter(s) Inhalation two times a day  sucralfate suspension 1 Gram(s) Enteral Tube two times a day  tranexamic acid Injectable for Topical Use 10 milliLiter(s) Topical once    MEDICATIONS  (PRN):  dextrose Oral Gel 15 Gram(s) Oral once PRN Blood Glucose LESS THAN 70 milliGRAM(s)/deciliter      Objective:    Vitals: Vital Signs Last 24 Hrs  T(C): 37.3 (02-16-24 @ 04:00), Max: 37.4 (02-16-24 @ 00:00)  T(F): 99.2 (02-16-24 @ 04:00), Max: 99.4 (02-16-24 @ 00:00)  HR: 103 (02-16-24 @ 06:00) (88 - 103)  BP: 126/46 (02-16-24 @ 06:00) (92/34 - 131/53)  BP(mean): 68 (02-16-24 @ 06:00) (49 - 73)  RR: 15 (02-16-24 @ 06:00) (12 - 22)  SpO2: 100% (02-16-24 @ 06:00) (96% - 100%)            I&O's Summary    15 Feb 2024 07:01  -  16 Feb 2024 07:00  --------------------------------------------------------  IN: 1953.1 mL / OUT: 1780 mL / NET: 173.1 mL        PHYSICAL EXAM:  GENERAL: NAD  HEAD:  Atraumatic, Normocephalic  EYES: EOMI, conjunctiva and sclera clear  CHEST/LUNG: Clear to auscultation bilaterally; No rales, rhonchi, wheezing, or rubs  HEART: Regular rate and rhythm; No murmurs, rubs, or gallops  ABDOMEN: Soft, Nontender, Nondistended;   SKIN: No rashes or lesions  NERVOUS SYSTEM:  Alert & Oriented X3, no focal deficits    LABS:  02-16    139  |  99  |  23  ----------------------------<  184<H>  3.7   |  26  |  1.67<H>  02-15    139  |  99  |  23  ----------------------------<  212<H>  3.6   |  26  |  1.65<H>  02-14    139  |  101  |  22  ----------------------------<  133<H>  4.0   |  26  |  1.62<H>    Ca    7.9<L>      16 Feb 2024 00:30  Ca    8.0<L>      15 Feb 2024 00:54  Ca    8.4      14 Feb 2024 14:45  Phos  3.6     02-16  Mg     2.10     02-16    TPro  6.3  /  Alb  2.2<L>  /  TBili  0.3  /  DBili  x   /  AST  28  /  ALT  21  /  AlkPhos  86  02-16  TPro  6.1  /  Alb  2.1<L>  /  TBili  0.3  /  DBili  x   /  AST  33  /  ALT  21  /  AlkPhos  83  02-15  TPro  6.7  /  Alb  2.3<L>  /  TBili  0.4  /  DBili  x   /  AST  29  /  ALT  23  /  AlkPhos  81  02-14      PT/INR - ( 16 Feb 2024 00:30 )   PT: 12.4 sec;   INR: 1.10 ratio         PTT - ( 16 Feb 2024 00:30 )  PTT:30.5 sec              Urinalysis Basic - ( 16 Feb 2024 00:30 )    Color: x / Appearance: x / SG: x / pH: x  Gluc: 184 mg/dL / Ketone: x  / Bili: x / Urobili: x   Blood: x / Protein: x / Nitrite: x   Leuk Esterase: x / RBC: x / WBC x   Sq Epi: x / Non Sq Epi: x / Bacteria: x      ABG - ( 16 Feb 2024 00:30 )  pH, Arterial: 7.43  pH, Blood: x     /  pCO2: 44    /  pO2: 120   / HCO3: 29    / Base Excess: 4.4   /  SaO2: 99.0                                    8.1    7.85  )-----------( 318      ( 16 Feb 2024 00:30 )             24.8                         8.6    9.09  )-----------( 327      ( 15 Feb 2024 10:00 )             26.7                         6.8    8.41  )-----------( 367      ( 15 Feb 2024 02:00 )             22.2     CAPILLARY BLOOD GLUCOSE      POCT Blood Glucose.: 198 mg/dL (16 Feb 2024 05:01)  POCT Blood Glucose.: 204 mg/dL (15 Feb 2024 23:13)  POCT Blood Glucose.: 173 mg/dL (15 Feb 2024 17:13)  POCT Blood Glucose.: 149 mg/dL (15 Feb 2024 11:30)      RADIOLOGY & ADDITIONAL TESTS:    Imaging Personally Reviewed:  [x ] YES  [ ] NO    Consultants involved in case:   Consultant(s) Notes Reviewed:  [ x] YES  [ ] NO:   Care Discussed with Consultants/Other Providers [x ] YES  [ ] NO         ***************************************************************  [facility]andel[facility] (MunirNavjot) Shantel PGY2  [facility]  ***************************************************************    SHAIK CHARANJIT  89y  MRN: [id number]    Patient is a 89y old  Male who presents with a chief complaint of COVID19, Chest pain (15 Feb 2024 21:52)      Subjective: Weaned off of propofol and levophed. Still unresponsive, not following commands.     MEDICATIONS  (STANDING):  albuterol/ipratropium for Nebulization 3 milliLiter(s) Nebulizer every 6 hours  artificial  tears Solution 1 Drop(s) Left EYE four times a day  aspirin  chewable 81 milliGRAM(s) Enteral Tube daily  chlorhexidine 0.12% Liquid 15 milliLiter(s) Oral Mucosa every 12 hours  chlorhexidine 2% Cloths 1 Application(s) Topical daily  dextrose 5%. 1000 milliLiter(s) (100 mL/Hr) IV Continuous <Continuous>  dextrose 5%. 1000 milliLiter(s) (50 mL/Hr) IV Continuous <Continuous>  dextrose 50% Injectable 25 Gram(s) IV Push once  dextrose 50% Injectable 12.5 Gram(s) IV Push once  dextrose 50% Injectable 25 Gram(s) IV Push once  doxazosin 2 milliGRAM(s) Oral at bedtime  escitalopram 10 milliGRAM(s) Oral daily  glucagon  Injectable 1 milliGRAM(s) IntraMuscular once  insulin glargine Injectable (LANTUS) 12 Unit(s) SubCutaneous <User Schedule>  insulin lispro (ADMELOG) corrective regimen sliding scale   SubCutaneous every 6 hours  levETIRAcetam  IVPB 500 milliGRAM(s) IV Intermittent every 12 hours  midodrine 30 milliGRAM(s) Oral every 8 hours  mupirocin 2% Ointment 1 Application(s) Topical two times a day  norepinephrine Infusion 0.05 MICROgram(s)/kG/Min (3.72 mL/Hr) IV Continuous <Continuous>  pantoprazole  Injectable 40 milliGRAM(s) IV Push every 12 hours  petrolatum Ophthalmic Ointment 1 Application(s) Left EYE at bedtime  piperacillin/tazobactam IVPB.. 3.375 Gram(s) IV Intermittent every 8 hours  propofol Infusion 10.004 MICROgram(s)/kG/Min (4.76 mL/Hr) IV Continuous <Continuous>  sodium chloride 3%  Inhalation 4 milliLiter(s) Inhalation two times a day  sucralfate suspension 1 Gram(s) Enteral Tube two times a day  tranexamic acid Injectable for Topical Use 10 milliLiter(s) Topical once    MEDICATIONS  (PRN):  dextrose Oral Gel 15 Gram(s) Oral once PRN Blood Glucose LESS THAN 70 milliGRAM(s)/deciliter      Objective:    Vitals: Vital Signs Last 24 Hrs  T(C): 37.3 (02-16-24 @ 04:00), Max: 37.4 (02-16-24 @ 00:00)  T(F): 99.2 (02-16-24 @ 04:00), Max: 99.4 (02-16-24 @ 00:00)  HR: 103 (02-16-24 @ 06:00) (88 - 103)  BP: 126/46 (02-16-24 @ 06:00) (92/34 - 131/53)  BP(mean): 68 (02-16-24 @ 06:00) (49 - 73)  RR: 15 (02-16-24 @ 06:00) (12 - 22)  SpO2: 100% (02-16-24 @ 06:00) (96% - 100%)            I&O's Summary    15 Feb 2024 07:01  -  16 Feb 2024 07:00  --------------------------------------------------------  IN: 1953.1 mL / OUT: 1780 mL / NET: 173.1 mL        PHYSICAL EXAM:  GENERAL: NAD, off sedation  HEAD:  Atraumatic, Normocephalic  EYES: EOMI, conjunctiva and sclera clear  CHEST/LUNG: Trach in place, on ventilator  HEART: Regular rate and rhythm; No murmurs, rubs, or gallops  ABDOMEN: Soft, Nontender, Nondistended;   SKIN: No rashes or lesions  NERVOUS SYSTEM:  Alert & Oriented X0, off sedation, unresponsive    LABS:  02-16    139  |  99  |  23  ----------------------------<  184<H>  3.7   |  26  |  1.67<H>  02-15    139  |  99  |  23  ----------------------------<  212<H>  3.6   |  26  |  1.65<H>  02-14    139  |  101  |  22  ----------------------------<  133<H>  4.0   |  26  |  1.62<H>    Ca    7.9<L>      16 Feb 2024 00:30  Ca    8.0<L>      15 Feb 2024 00:54  Ca    8.4      14 Feb 2024 14:45  Phos  3.6     02-16  Mg     2.10     02-16    TPro  6.3  /  Alb  2.2<L>  /  TBili  0.3  /  DBili  x   /  AST  28  /  ALT  21  /  AlkPhos  86  02-16  TPro  6.1  /  Alb  2.1<L>  /  TBili  0.3  /  DBili  x   /  AST  33  /  ALT  21  /  AlkPhos  83  02-15  TPro  6.7  /  Alb  2.3<L>  /  TBili  0.4  /  DBili  x   /  AST  29  /  ALT  23  /  AlkPhos  81  02-14      PT/INR - ( 16 Feb 2024 00:30 )   PT: 12.4 sec;   INR: 1.10 ratio         PTT - ( 16 Feb 2024 00:30 )  PTT:30.5 sec              Urinalysis Basic - ( 16 Feb 2024 00:30 )    Color: x / Appearance: x / SG: x / pH: x  Gluc: 184 mg/dL / Ketone: x  / Bili: x / Urobili: x   Blood: x / Protein: x / Nitrite: x   Leuk Esterase: x / RBC: x / WBC x   Sq Epi: x / Non Sq Epi: x / Bacteria: x      ABG - ( 16 Feb 2024 00:30 )  pH, Arterial: 7.43  pH, Blood: x     /  pCO2: 44    /  pO2: 120   / HCO3: 29    / Base Excess: 4.4   /  SaO2: 99.0                                    8.1    7.85  )-----------( 318      ( 16 Feb 2024 00:30 )             24.8                         8.6    9.09  )-----------( 327      ( 15 Feb 2024 10:00 )             26.7                         6.8    8.41  )-----------( 367      ( 15 Feb 2024 02:00 )             22.2     CAPILLARY BLOOD GLUCOSE      POCT Blood Glucose.: 198 mg/dL (16 Feb 2024 05:01)  POCT Blood Glucose.: 204 mg/dL (15 Feb 2024 23:13)  POCT Blood Glucose.: 173 mg/dL (15 Feb 2024 17:13)  POCT Blood Glucose.: 149 mg/dL (15 Feb 2024 11:30)      RADIOLOGY & ADDITIONAL TESTS:    Imaging Personally Reviewed:  [x ] YES  [ ] NO    Consultants involved in case:   Consultant(s) Notes Reviewed:  [ x] YES  [ ] NO:   Care Discussed with Consultants/Other Providers [x ] YES  [ ] NO

## 2024-02-16 NOTE — PROGRESS NOTE ADULT - CRITICAL CARE SERVICES PROVIDED
Patient is critically ill, requiring critical care services.
English

## 2024-02-16 NOTE — PROGRESS NOTE ADULT - ATTENDING SUPERVISION STATEMENT
Fellow
Resident
Fellow
Resident

## 2024-02-16 NOTE — CHART NOTE - NSCHARTNOTEFT_GEN_A_CORE
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 Of note, family states that the patient has had worsening confusion at home over the past 6 months (becoming disoriented to time) but recently has been more confused, talking about seeing people that are not present. On arrival to the ED his vitals were T 98 -> 99.2, P 80, /72, RR 18, ) sat 100% RA. His lab work showed leukocytosis with neutrophilia and bandemia, MABLE, mild hyponatremia, indeterminant but stable troponin, and elevated lactate to 2.3. His COVID19 swab was positive. His CXR showed bibasilar crackles. Pt's hospital course complicated by SMA angiography w SMA calcification with stent placement with diagnostic laparoscopy 12/24, small bowel and visible colon viable, some inflammation of omentum in RUQ. Course c/b GI bleed upper endoscopy showing grade b esophagitis, bleeding Dieulafoy's lesion, s/p scope on 1/2 with GI with clips placed. Pt received 1 dose of remdesivir for COVID +. Rapid response was called on 1/19 for increased WOB, Pulmonary seen at bedside, pt on HFNC but transferred to MICU for monitoring.     MICU COURSE:  Pt was intubated on 1/22 for AHRF, extubated 2/1, reintubated 2/2. Course complicated by acute cholecystitis, managed with perc asa. Pt's course was complicated by poor neurologic recovery and per family, was opted to perform trach, which was done on 2/13. Now pending PEG but having recurrent fevers - per ID consulted, stress vs. occult infection - on zosyn for empiric course, now stable, pending PEG re-evaluation by GI next week. on 2/20. Pressors weaned as Propofol weaned, now transferred for RCU 2/16.       OBJECTIVE:  ICU Vital Signs Last 24 Hrs  T(C): 38.1 (16 Feb 2024 16:40), Max: 38.1 (16 Feb 2024 16:40)  T(F): 100.5 (16 Feb 2024 16:40), Max: 100.5 (16 Feb 2024 16:40)  HR: 108 (16 Feb 2024 16:40) (88 - 108)  BP: 128/52 (16 Feb 2024 16:40) (102/40 - 131/53)  BP(mean): 71 (16 Feb 2024 16:40) (55 - 73)  ABP: --  ABP(mean): --  RR: 20 (16 Feb 2024 16:40) (12 - 21)  SpO2: 99% (16 Feb 2024 16:40) (96% - 100%)    O2 Parameters below as of 16 Feb 2024 16:40  Patient On (Oxygen Delivery Method): ventilator    O2 Concentration (%): 30      Mode: AC/ CMV (Assist Control/ Continuous Mandatory Ventilation), RR (machine): 12, TV (machine): 450, FiO2: 30, PEEP: 5, ITime: 0.72, MAP: 8, PIP: 26    02-15 @ 07:01 - 02-16 @ 07:00  --------------------------------------------------------  IN: 2098.1 mL / OUT: 1780 mL / NET: 318.1 mL    02-16 @ 07:01  -  02-16 @ 17:34  --------------------------------------------------------  IN: 550 mL / OUT: 170 mL / NET: 380 mL      CAPILLARY BLOOD GLUCOSE      POCT Blood Glucose.: 233 mg/dL (16 Feb 2024 17:09)      Mode: AC/ CMV (Assist Control/ Continuous Mandatory Ventilation)  RR (machine): 12  TV (machine): 450  FiO2: 30  PEEP: 5  ITime: 0.72  MAP: 8  PIP: 26      HOSPITAL MEDICATIONS:  MEDICATIONS  (STANDING):  albuterol/ipratropium for Nebulization 3 milliLiter(s) Nebulizer every 6 hours  artificial  tears Solution 1 Drop(s) Left EYE four times a day  aspirin  chewable 81 milliGRAM(s) Enteral Tube daily  chlorhexidine 0.12% Liquid 15 milliLiter(s) Oral Mucosa every 12 hours  dextrose 5%. 1000 milliLiter(s) (100 mL/Hr) IV Continuous <Continuous>  dextrose 5%. 1000 milliLiter(s) (50 mL/Hr) IV Continuous <Continuous>  dextrose 50% Injectable 25 Gram(s) IV Push once  dextrose 50% Injectable 25 Gram(s) IV Push once  dextrose 50% Injectable 12.5 Gram(s) IV Push once  doxazosin 2 milliGRAM(s) Oral at bedtime  escitalopram 10 milliGRAM(s) Oral daily  glucagon  Injectable 1 milliGRAM(s) IntraMuscular once  heparin   Injectable 5000 Unit(s) SubCutaneous every 8 hours  insulin glargine Injectable (LANTUS) 12 Unit(s) SubCutaneous <User Schedule>  insulin lispro (ADMELOG) corrective regimen sliding scale   SubCutaneous every 6 hours  levETIRAcetam  IVPB 500 milliGRAM(s) IV Intermittent every 12 hours  midodrine 30 milliGRAM(s) Oral every 8 hours  mupirocin 2% Ointment 1 Application(s) Topical two times a day  pantoprazole  Injectable 40 milliGRAM(s) IV Push every 12 hours  petrolatum Ophthalmic Ointment 1 Application(s) Left EYE at bedtime  piperacillin/tazobactam IVPB.. 3.375 Gram(s) IV Intermittent every 8 hours  sodium chloride 3%  Inhalation 4 milliLiter(s) Inhalation two times a day  sucralfate suspension 1 Gram(s) Enteral Tube two times a day    MEDICATIONS  (PRN):  dextrose Oral Gel 15 Gram(s) Oral once PRN Blood Glucose LESS THAN 70 milliGRAM(s)/deciliter      LABS:                        8.1    7.85  )-----------( 318      ( 16 Feb 2024 00:30 )             24.8     02-16    139  |  99  |  23  ----------------------------<  184<H>  3.7   |  26  |  1.67<H>    Ca    7.9<L>      16 Feb 2024 00:30  Phos  3.6     02-16  Mg     2.10     02-16    TPro  6.3  /  Alb  2.2<L>  /  TBili  0.3  /  DBili  x   /  AST  28  /  ALT  21  /  AlkPhos  86  02-16    PT/INR - ( 16 Feb 2024 00:30 )   PT: 12.4 sec;   INR: 1.10 ratio         PTT - ( 16 Feb 2024 00:30 )  PTT:30.5 sec  Urinalysis Basic - ( 16 Feb 2024 00:30 )    Color: x / Appearance: x / SG: x / pH: x  Gluc: 184 mg/dL / Ketone: x  / Bili: x / Urobili: x   Blood: x / Protein: x / Nitrite: x   Leuk Esterase: x / RBC: x / WBC x   Sq Epi: x / Non Sq Epi: x / Bacteria: x      Arterial Blood Gas:  02-16 @ 00:30  7.43/44/120/29/99.0/4.4  ABG lactate: --  Arterial Blood Gas:  02-15 @ 00:54  7.48/37/143/28/99.4/3.8  ABG lactate: --      ASSESSMENT & PLAN:   WALTER DONOHUE is a 89y male with PMH of CAD s/p CABG s/p stents (last stent May 2022), SMA stent placed 12/23, recent upper GI bleed 12/23, s/p PPM, DM2, CKD (baseline Cr 1.2-1.3 as per family), PVD, HTN, HLD, CVA x3 (without residual deficits), and 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       NEUROLOGY  #CVA  - prior CVA x3 with no residual deficits  - C/w Aspirin 81 mg QD    #Myoclonic jerks  - on Keppra 500 mg BID, per neuro wishing to wean however per family request will continue at 500 mg BID, but now off valproate per neuro  - holding home gabapentin and memantine in setting of somnolence  - continue lexapro     #AMS  - CT/CTA negative for stroke. EEG now off   - 2/8 MRI brain - unremarkable findings. Family now amenable to Trach/PEG.     CARDIOVASCULAR  #HTN  - home amlodipine and metoprolol on hold  - Midodrine 30 q8h, wean as tolerated. Off pressors 2/16     #CAD  - on aspirin, holding Brilinta and ranolazine  - Need to restart brilinta ASAP after Trach/PEG, last stent 2022    #Afib  - pt with known history of pAF, first observed 2/1  - can consider starting AC and/or rate control if persists     RESPIRATORY  #COVID  - s/p paxlovid and 1 dose remdesivir while inpatient    #Pleural effusions b/l  - CT chest from 1/16 showing new small bilateral pleural effusions/ atelectasis/ patchy bilateral ground-glass opacities are consistent with pulmonary edema.  - Diuresis PRN based on amount of pleural effusions on POCUS    #AHRF  - Intubated for airway protection in s/o AMS, now trach on 2/13 -  - Holding brilinta for possible procedure. Will need to restart ASAP after trach/PEG  - s/p zosyn for aspiration PNA from 1/20- 1/28   - MRSA Swab positive for Staph Aureus only, started on Mupirocin (2/13 - )  - Restarted on Zosyn (2/12 - ). BAL culture negative  - Hypertonic saline 4 mL Q6H, dueonebs 3 mL q6H    GI  #UGIB  - s/p EGD 1/2/24 showing dieulafoy lesion and esophagitis likely 2/2 NGT irritation s/p 2 clips  - On Protonix IV BID     #Acute Cholecystitis   - 12/26 Distended gallbladder with cholelithiasis and sludge  - 12/29 HIDA scan positive for Acute Asa   - S/p Zosyn (12/24 - 12/31)  - 1/26 s/p percutaneous cholecystostomy tube placement by IR     #Nutrition   - tube feeds NGT   - Discussed w/ family, want PEG.   - Trach 2/13, per GI PEG only after afebrile x 48 hours. Next eval would be Tuesday, 2/20.     RENAL  #CKD3  - CTM renal function   - on Cardura   - TOV 2/16    INFECTIOUS DISEASE  #COVID   - s/p paxlovid and 1 dose remdesivir    # PNA  - 1/16 CT chest showing ground glass opacities  - s/p zosyn   - intermittent episodes of fevers, likely source GI given choleycystitis? culture NGTD  - meropenem 5d course thru (1/28 - 2/1)  - newly hypothermic on 2/11, on Zosyn (2/12 - ), unclear source, work-up unrevealing, plan for 7-day course.  - RVP negative 2/11, Blood cultures 2/11 negative, UA 2/11 negative  - 2/13 Bronch studies NGTD     HEME/ VASCULAR  #Anemia  - CTM CBC daily     #Mesenteric ischemia  - CTA demonstrating mesenteric fat stranding associated with ascending/transverse colon.   - 12/24 s/p SMA angiography with stent placement with diagnostic laparoscopy, small bowel and visible colon viable, some inflammation of omentum in RUQ.  - On ASA, Brillinta currently on hold pending PEG eval. Need to resume after procedure.     #DVT PPX  -  Q8H    ENDOCRINE  #DM2  - A1c 9.3   - on lantus 12U and q6 SSI    LINES/ TUBES  - NGT  - Moss plan for TOV 2/16   - cholecystostomy tube 1/26     ETHICS/ GOC    - Trach/ eventual PEG    DISPO: TBD      For Follow-Up:  [  ] Complete Zosyn for empiric course x7 days (2/12-2/19)   [  ] NPO at [facility] on Monday, 1/19 for PEG. GI may place if pt afebrile 48 hrs. Stop heparin SubQ beforehand.   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 Of note, family states that the patient has had worsening confusion at home over the past 6 months (becoming disoriented to time) but recently has been more confused, talking about seeing people that are not present. On arrival to the ED his vitals were T 98 -> 99.2, P 80, /72, RR 18, ) sat 100% RA. His lab work showed leukocytosis with neutrophilia and bandemia, MABLE, mild hyponatremia, indeterminant but stable troponin, and elevated lactate to 2.3. His COVID19 swab was positive. His CXR showed bibasilar crackles. Pt's hospital course complicated by SMA angiography w SMA calcification with stent placement with diagnostic laparoscopy 12/24, small bowel and visible colon viable, some inflammation of omentum in RUQ. Course c/b GI bleed upper endoscopy showing grade b esophagitis, bleeding Dieulafoy's lesion, s/p scope on 1/2 with GI with clips placed. Pt received 1 dose of remdesivir for COVID +. Rapid response was called on 1/19 for increased WOB, Pulmonary seen at bedside, pt on HFNC but transferred to MICU for monitoring.     MICU COURSE:  Pt was intubated on 1/22 for AHRF, extubated 2/1, reintubated 2/2. Course complicated by acute cholecystitis, managed with perc asa. Pt's course was complicated by poor neurologic recovery and per family, was opted to perform trach, which was done on 2/13. Now pending PEG but having recurrent fevers - per ID consulted, stress vs. occult infection - on zosyn for empiric course, now stable, pending PEG re-evaluation by GI next week. on 2/20. Pressors weaned as Propofol weaned, now transferred for RCU 2/16.       ICU Vital Signs Last 24 Hrs  T(C): 38.1 (16 Feb 2024 16:40), Max: 38.1 (16 Feb 2024 16:40)  T(F): 100.5 (16 Feb 2024 16:40), Max: 100.5 (16 Feb 2024 16:40)  HR: 108 (16 Feb 2024 16:40) (88 - 108)  BP: 128/52 (16 Feb 2024 16:40) (102/40 - 131/53)  BP(mean): 71 (16 Feb 2024 16:40) (55 - 73)  ABP: --  ABP(mean): --  RR: 20 (16 Feb 2024 16:40) (12 - 21)  SpO2: 99% (16 Feb 2024 16:40) (96% - 100%)    O2 Parameters below as of 16 Feb 2024 16:40  Patient On (Oxygen Delivery Method): ventilator    O2 Concentration (%): 30      ASSESSMENT & PLAN:   WALTER DONOHUE is a 89y male with PMH of CAD s/p CABG s/p stents (last stent May 2022), SMA stent placed 12/23, recent upper GI bleed 12/23, s/p PPM, DM2, CKD (baseline Cr 1.2-1.3 as per family), PVD, HTN, HLD, CVA x3 (without residual deficits), and 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       NEUROLOGY  #CVA  - prior CVA x3 with no residual deficits  - C/w Aspirin 81 mg QD    #Myoclonic jerks  - on Keppra 500 mg BID, per neuro wishing to wean however per family request will continue at 500 mg BID, but now off valproate per neuro  - holding home gabapentin and memantine in setting of somnolence  - continue lexapro     #AMS  - CT/CTA negative for stroke. EEG now off   - 2/8 MRI brain - unremarkable findings. Family now amenable to Trach/PEG.     CARDIOVASCULAR  #HTN  - home amlodipine and metoprolol on hold  - Midodrine 30 q8h, wean as tolerated. Off pressors 2/16     #CAD  - on aspirin, holding Brilinta and ranolazine  - Need to restart brilinta ASAP after Trach/PEG, last stent 2022    #Afib  - pt with known history of pAF, first observed 2/1  - can consider starting AC and/or rate control if persists     RESPIRATORY  #COVID  - s/p paxlovid and 1 dose remdesivir while inpatient    #Pleural effusions b/l  - CT chest from 1/16 showing new small bilateral pleural effusions/ atelectasis/ patchy bilateral ground-glass opacities are consistent with pulmonary edema.  - Diuresis PRN based on amount of pleural effusions on POCUS    #AHRF  - Intubated for airway protection in s/o AMS, now trach on 2/13 -  - Holding brilinta for possible procedure. Will need to restart ASAP after trach/PEG  - s/p zosyn for aspiration PNA from 1/20- 1/28   - MRSA Swab positive for Staph Aureus only, started on Mupirocin (2/13 - )  - Restarted on Zosyn (2/12 - ). BAL culture negative  - Hypertonic saline 4 mL Q6H, dueonebs 3 mL q6H    GI  #UGIB  - s/p EGD 1/2/24 showing dieulafoy lesion and esophagitis likely 2/2 NGT irritation s/p 2 clips  - On Protonix IV BID     #Acute Cholecystitis   - 12/26 Distended gallbladder with cholelithiasis and sludge  - 12/29 HIDA scan positive for Acute Asa   - S/p Zosyn (12/24 - 12/31)  - 1/26 s/p percutaneous cholecystostomy tube placement by IR     #Nutrition   - tube feeds NGT   - Discussed w/ family, want PEG.   - Trach 2/13, per GI PEG only after afebrile x 48 hours. Next eval would be Tuesday, 2/20.     RENAL  #CKD3  - CTM renal function   - on Cardura   - TOV 2/16    INFECTIOUS DISEASE  #COVID   - s/p paxlovid and 1 dose remdesivir    # PNA  - 1/16 CT chest showing ground glass opacities  - s/p zosyn   - intermittent episodes of fevers, likely source GI given choleycystitis? culture NGTD  - meropenem 5d course thru (1/28 - 2/1)  - newly hypothermic on 2/11, on Zosyn (2/12 - ), unclear source, work-up unrevealing, plan for 7-day course.  - RVP negative 2/11, Blood cultures 2/11 negative, UA 2/11 negative  - 2/13 Bronch studies NGTD     HEME/ VASCULAR  #Anemia  - CTM CBC daily     #Mesenteric ischemia  - CTA demonstrating mesenteric fat stranding associated with ascending/transverse colon.   - 12/24 s/p SMA angiography with stent placement with diagnostic laparoscopy, small bowel and visible colon viable, some inflammation of omentum in RUQ.  - On ASA, Brillinta currently on hold pending PEG eval. Need to resume after procedure.     #DVT PPX  -  Q8H    ENDOCRINE  #DM2  - A1c 9.3   - on lantus 12U and q6 SSI    LINES/ TUBES  - NGT  - Moss plan for TOV 2/16   - cholecystostomy tube 1/26     ETHICS/ GOC    - Trach/ eventual PEG    DISPO: TBD      For Follow-Up:  [  ] Complete Zosyn for empiric course x7 days (2/12-2/19)   [  ] NPO at [facility] on Monday, 1/19 for PEG. GI may place if pt afebrile 48 hrs. Stop heparin SubQ beforehand.   [  ] Restart Brilinta and Heparin SubQ given pt's hx of stents (Last 5/2022) once PEG placed  [  ] f/u TOV done on 2/16  [  ] Diuresis PRN to keep net even  [  ] Reach out to IR once PEG placed re: pulling cholecystostomy tube  [  ] Continue BID protonix dosing due to prior GIB

## 2024-02-16 NOTE — PROGRESS NOTE ADULT - ATTENDING COMMENTS
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) admitted to MICU for acute respiratory failure s/p bronchoscopy 1/19 requiring intubation (1/22). Course c/b acute cholecystitis s/p perc asa 1/26 by IR, also with recent SMA stent.   Patient failed trial of extubation 2/2 ?aspiration, ?mucus plugging iso AMS 2/2 toxic metabolic encephalopathy.     Currently s/p tracheostomy 2/13/24.    N: Myoclonus, ?Seizure do  Toxic metabolic encephalopathy  MRI negative for acute pathology  Petechial Hemorrhages   - Off sedation  - Lexapro and Keppra  - Optho for hemorrhages  CV: Sedation related hypotension  Shock: Cardiogenic v. distributive v. vasoplegic   AF with RVR  CAD s/p CABG and PCI (2022)   - Midodrine 30 q 8 hours  - MAP goal >65 off vasopressor support  - ASA for CAD, holding Brillinta (plan to resume post procedures)  P Acute hypoxemic/hypercapnic respiratory failure requiring intubation mechanical ventilation  Multifocal Pneumonia  MSSA pneumonia  Pleural effusions  Aspiration   COVID  - Lung protective ventilation  - Daily PSV, tolerating 5/5, 30%  - Duonebs q 6 hours, and airway clearance: inhaled hypertonic saline q 12  - Goal net negative to even  GI: Acute cholecystitis s/p biliary tube  SMA stent   - Planned for PEG in the coming days; required to be afebrile for 48 hours--unlikely to occur until next week  - TF resumed  Renal:   - Trend creatinine, trending   - Replete lytes as needed  - Diuresis as needed goal net even  - DC RAJAN delong  ID: Acute asa s/p perc asa  Suspect non infectious fevers, low grade and no localizing symptoms  - Infectious work up negative thus far, will follow up pending studies  - ID following  - Zosyn - planned for 7 days  Heme: Anemia of chronic disease  Endo:   - Avoid hypoglycemia    DVT: SQH  GI: PPI 40 BID iso GIB    Full code  Prognosis guarded.   Updated multiple family members at bedside

## 2024-02-16 NOTE — PROGRESS NOTE ADULT - ASSESSMENT
IMPRESSION:  89M w/ DM2, HTN, CVA, PAD, CKD3, and CAD-CABG, 12/23/23 p/w COVID19 infection, c/b respiratory failure/hypotension; now s/p tracheostomy    (1)Renal - CKD3; superimposed mild prerenal azotemia - numbers fluctuating based on hemodynamic status  (2)Lytes - acceptable   (3)CV - now off pressors, see he is on Cardura; does he need it?  (4)Pulm - vent-dependent   (5ID - still febrile; on IV Zosyn  (6)GI - needing to be afebrile for 24h prior to PEG    RECOMMEND:   (1)Favor d/c of Cardura  (2)Continue abx for GFR 30-40ml/min    Faby Cummins DNP  [facility]  [phone]      IMPRESSION:  89M w/ DM2, HTN, CVA, PAD, CKD3, and CAD-CABG, 12/23/23 p/w COVID19 infection, c/b respiratory failure/hypotension; now s/p tracheostomy    (1)Renal - CKD3; superimposed mild prerenal azotemia - numbers fluctuating based on hemodynamic status  (2)Lytes - acceptable   (3)CV - now off pressors, see he is on Cardura; does he need it?  (4)Pulm - vent-dependent   (5ID - still febrile; on IV Zosyn  (6)GI - needing to be afebrile for 24h prior to PEG    RECOMMEND:   (1)Favor d/c of Cardura  (2)Continue abx for GFR 30-40ml/min    Faby Cummins DNP  [facility]  [phone]     RENAL ATTENDING NOTE  Patient seen and examined with NP. Agree with assessment and plan as above.    Davey Chacko MD  [facility]  [phone]

## 2024-02-16 NOTE — PROGRESS NOTE ADULT - CRITICAL CARE SERVICES
37
32
35
35
45
45
35
45
45
35
45
50
35
44
45
105
35
40
45
77
44
45
35
69
69
no

## 2024-02-16 NOTE — PROGRESS NOTE ADULT - ASSESSMENT
89-yo M w/ PMH of CAD s/p CABG w/ stents, s/p PPM, DM, PVD, CVA, CKD, and myoclonic jerks, admitted on 12/23 i/s/o recent COVID.   CT C/A/P revealing findings suggestive of acute cholecystitis (12/24/23)  S/p exlap, mesenteric angiogram, and SMA stent (12/24)  HIDA (12/29) supported the diagnosis of acute cholecystitis. Treated medically (Zosyn 12/24-31)  LUE hematoma noted 1/10 on venous Duplex, w/ visualization of complex, avascular fluid collection (5.3 x 2.7 x 3.7 cm), possibly hematoma  Perc asa on 1/26  Given course of meropenem and stopped  On 2/12 restarted on Zosyn for episode of fever  LFTs WNL  S/p Trach 2/13  Now with intermittent fevers, plan for PEG--GI holding on PEG in setting fever  2/13 Bronch studies NGTD  BCXs 2/11 NGTD  WBC has been normal  Unclear source fever at this point in a patient with complex hospital course (occult infection? noninfectious reactive to stressors?)  Complete treatment Zosyn course, but uncertain if true infection  With onset eosinophilia, drug fever?  Overall, Fever, cholecystitis s/p cholecystostomy  - Zosyn 3.375g q 8, 5-7 days then discontinue--unclear treatment target  - F/U BCXs  - Monitor fever curve, monitor for alternate signs infection  - Timing PEG per GI  - Eosinophils on CBC noted--drug allergy? Monitor for signs/symptoms allergic reaction. Check Strongy Abs (lower suspicion); trend eosinophila    Rangel Bello MD  Contact on TEAMS messaging from 9am - 5pm  From 5pm-9am, on weekends, or if no response call [phone]

## 2024-02-16 NOTE — PROGRESS NOTE ADULT - SUBJECTIVE AND OBJECTIVE BOX
CC: F/U for Fever    Saw/spoke to patient. Unchanged. No new complaints. More awake today.    Allergies  fluoroquinolone antibiotics (Other)  Cipro (Unknown)  Tegretol (Unknown)  carbamazepine (Other)    ANTIMICROBIALS:  piperacillin/tazobactam IVPB.. 3.375 every 8 hours    PE:    Vital Signs Last 24 Hrs  T(C): 37.7 (16 Feb 2024 12:00), Max: 37.8 (16 Feb 2024 08:00)  T(F): 99.9 (16 Feb 2024 12:00), Max: 100 (16 Feb 2024 08:00)  HR: 106 (16 Feb 2024 14:00) (88 - 108)  BP: 111/49 (16 Feb 2024 14:00) (92/34 - 131/53)  BP(mean): 65 (16 Feb 2024 14:00) (49 - 73)  RR: 13 (16 Feb 2024 14:00) (12 - 22)  SpO2: 97% (16 Feb 2024 14:00) (96% - 100%)    Gen: AOx3, NAD, non-toxic  CV: Nontachycardic  Resp: Breathing comfortably, RA  Abd: Soft, nontender  IV/Skin: No thrombophlebitis    LABS:                        8.1    7.85  )-----------( 318      ( 16 Feb 2024 00:30 )             24.8     02-16    139  |  99  |  23  ----------------------------<  184<H>  3.7   |  26  |  1.67<H>    Ca    7.9<L>      16 Feb 2024 00:30  Phos  3.6     02-16  Mg     2.10     02-16    TPro  6.3  /  Alb  2.2<L>  /  TBili  0.3  /  DBili  x   /  AST  28  /  ALT  21  /  AlkPhos  86  02-16    Urinalysis Basic - ( 16 Feb 2024 00:30 )    Color: x / Appearance: x / SG: x / pH: x  Gluc: 184 mg/dL / Ketone: x  / Bili: x / Urobili: x   Blood: x / Protein: x / Nitrite: x   Leuk Esterase: x / RBC: x / WBC x   Sq Epi: x / Non Sq Epi: x / Bacteria: x    MICROBIOLOGY:    .Bronchial Bronchial Lavage  02-13-24   Normal Respiratory Aurelia present  --    Numerous polymorphonuclear leukocytes seen per low power field  Rare Squamous epithelial cells seen per low power field  No organisms seen per oil power field    .Blood Blood-Peripheral  02-11-24   No growth at 4 days  --  --    .Body Fluid gallbladder  01-26-24   No growth at 5 days  --    No polymorphonuclear leukocytes per low power field  No organisms seen per oil power field    .Blood Blood  01-24-24   No growth at 5 days  --  --    .Bronchial Bronchial  01-22-24   Normal Respiratory Aurelia present  --    Moderate polymorphonuclear leukocytes seen per low power field  No squamous epithelial cells per low power field  No organisms seen per oil power field    .Blood Blood-Peripheral  01-20-24   No growth at 5 days  --  --    .Blood Blood-Peripheral  01-20-24   No growth at 5 days  --  --    .Bronchial R MIDDLE LOBE  01-19-24   Numerous Staphylococcus aureus  Normal Respiratory Aurelia present  --  Staphylococcus aureus    Rapid RVP Result: NotDetec (02-11 @ 23:23)    RADIOLOGY:    2/13 XR    IMPRESSION:  *  Status post extubation.  *  Tracheostomy and NG tube placement.  *  Hazy lung bases likely effusions/atelectasis.   Spontaneous, unlabored and symmetrical

## 2024-02-16 NOTE — CHART NOTE - NSCHARTNOTEFT_GEN_A_CORE
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 Of note, family states that the patient has had worsening confusion at home over the past 6 months (becoming disoriented to time) but recently has been more confused, talking about seeing people that are not present. On arrival to the ED his vitals were T 98 -> 99.2, P 80, /72, RR 18, ) sat 100% RA. His lab work showed leukocytosis with neutrophilia and bandemia, MABLE, mild hyponatremia, indeterminant but stable troponin, and elevated lactate to 2.3. His COVID19 swab was positive. His CXR showed bibasilar crackles. Pt's hospital course complicated by SMA angiography w SMA calcification with stent placement with diagnostic laparoscopy 12/24, small bowel and visible colon viable, some inflammation of omentum in RUQ. Course c/b GI bleed upper endoscopy showing grade b esophagitis, bleeding Dieulafoy's lesion, s/p scope on 1/2 with GI with clips placed. Pt received 1 dose of remdesivir for COVID +. Rapid response was called on 1/19 for increased WOB, Pulmonary seen at bedside, pt on HFNC but transferred to MICU for monitoring.     MICU COURSE:  Pt was intubated on 1/22 for AHRF, extubated 2/1, reintubated 2/2. Course complicated by acute cholecystitis, managed with perc asa. Pt's course was complicated by poor neurologic recovery and per family, was opted to perform trach, which was done on 2/13. Now pending PEG but having recurrent fevers - per ID consulted, stress vs. occult infection - on zosyn for empiric course, now stable, pending PEG re-evaluation by GI next week. on 2/20. Pressors weaned as Propofol weaned, now stable for RCU.     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     PLAN  =====Neurologic=====  #CVA  - prior CVA x3 with no residual deficits    #myoclonic jerks  - on Keppra 500 mg BID, per neuro wishing to wean however per family request will continue at 500 mg BID, but now off valproate per neuro    - also per neuro, use precedex as needed for clinical sedation    - Started prop on 2/10, in anticipation of procedure  - holding home gabapentin and memantine in setting of somnolence  - continue lexapro     #AMS  - CT/CTA negative for stroke. EEG now off   - MRI brain - unremarkable findings. Family now amenable to Trach/PEG.     =====Pulmonary=====    #COVID  - s/p paxlovid and 1 dose remdesivir while inpatient    #Pleural effusions b/l  - CT chest from 1/16 showing new small bilateral pleural effusions/ atelectasis/ patchy bilateral ground-glass opacities are consistent with pulmonary edema.  - Diuresis PRN based on amount of pleural effusions on POCUS    #AHRF  - Intubated for airway protection in s/o AMS. Sedated  - Holding brilinta for possible procedure. Will need to restart ASAP after trach/PEG  - s/p zosyn for aspiration PNA from 1/20- 1/28   - MRSA Swab positive for Staph Aureus only, started on Mupirocin 2/13-  - Restarted on Zosyn. BAL culture negative  - Trach 2/13/2024.     =====Cardiovascular=====  Midodrine 30 q8h, wean prop as tolerated to wean pressors    #HTN  - on home amlodipine and metoprolol currently holding     #CAD  - on Brilinta (holding) aspirin and ranolazine continue   - as per vascular okay to hold Brilinta for possible pleural effusion thoracentesis  -have not heard back from cardiology regarding Brilinta / last stent 2022 , will hold   -Need to restart brilinta ASAP after Trach/PEG    #Afib  - pt with known history of pAF, first observed 2/1  - can consider starting AC and/or rate control if persists     =====GI=====  #upper GI bleed  - s/p EGD showing dieulafoy lesion and esophagitis likely 2/2 NGT irritation s/p 2 clips  - on protonix IV BID continue   - CT on 1/25 with cholelithiasis and sludge HIDA on 1/26 confirming likely acute choley, s/p IR perc cholecystostomy 1/26   - Discussed w/ family, want trach/PEG. Discussed w/ GI - Only will do PEG 24-48 hours after trach placed. Recommend reaching out when trach date is set  - Trach 2/13, per GI PEG only after afebrile x48 hours. Next eval would be Tuesday, 2/20. Family demanding second opinion and frustrated w/ inability to place PEG, reached out to GI re: second opinion or private to see patient for PEG.     #Diet  - tube feeds NGT   - Per GI, no PEG today but unclear when GI will place PEG, will get back re: timing    =====Renal/=====  #Electrolytes  - Maintain K>4, Phos>3, Mag>2, iCal>1  - baseline cr 1.5, at baseline  - on free water flushes for hyperNa  - Diuresis PRN   - TOV 2/16    =====Endocrine=====  #DM2  - on lantus 12U and q6 SSI  - a1c 9.3, glucose wnl inpatient     =====Infectious Disease=====  #COVID   - s/p paxlovid and 1 dose remdesivir  # PNA  - CT chest showing ground glass opacities  - s/p zosyn  - intermittent episodes of fevers, likely source GI given choleycystitis? culture NGTD  - meropenem 5d course thru 2/1   - now monitoring off antibiotics  - newly hypothermic on 2/11, on Zosyn, unclear source, work-up unrevealing, plan for 7-day course    =====Heme/Onc=====  #DVT Ppx  - heparin sub-q held in s/o procedure, restart when able    =====Ethics=====  FULL CODE.  After family discussion, Trach/PEG    For Follow-Up:  [  ] Complete Zosyn for empiric course x7 days (2/12-2/19)   [  ] NPO at [facility] on Monday, 1/19 for PEG. GI may place if pt afebrile 48 hrs. Stop heparin SubQ beforehand.   [  ] Restart Brilinta and Heparin SubQ given pt's hx of stents (Last 5/2022) once PEG placed  [  ] f/u TOV done on 2/16  [  ] Diuresis PRN to keep net even  [  ] Reach out to IR once PEG placed re: pulling cholecystostomy tube  [  ] Continue BID protonix dosing due to prior GIB    Vital Signs Last 24 Hrs  T(C): 37.7 (16 Feb 2024 12:00), Max: 37.8 (16 Feb 2024 08:00)  T(F): 99.9 (16 Feb 2024 12:00), Max: 100 (16 Feb 2024 08:00)  HR: 101 (16 Feb 2024 12:00) (88 - 105)  BP: 109/42 (16 Feb 2024 12:00) (92/34 - 131/53)  BP(mean): 59 (16 Feb 2024 12:00) (49 - 73)  RR: 12 (16 Feb 2024 12:00) (12 - 22)  SpO2: 97% (16 Feb 2024 12:00) (96% - 100%)    Parameters below as of 16 Feb 2024 12:00  Patient On (Oxygen Delivery Method): ventilator    O2 Concentration (%): 30  I&O's Summary    15 Feb 2024 07:01  -  16 Feb 2024 07:00  --------------------------------------------------------  IN: 2098.1 mL / OUT: 1780 mL / NET: 318.1 mL    16 Feb 2024 07:01  -  16 Feb 2024 14:15  --------------------------------------------------------  IN: 325 mL / OUT: 170 mL / NET: 155 mL          MEDICATIONS  (STANDING):  albuterol/ipratropium for Nebulization 3 milliLiter(s) Nebulizer every 6 hours  artificial  tears Solution 1 Drop(s) Left EYE four times a day  aspirin  chewable 81 milliGRAM(s) Enteral Tube daily  chlorhexidine 0.12% Liquid 15 milliLiter(s) Oral Mucosa every 12 hours  chlorhexidine 2% Cloths 1 Application(s) Topical daily  dextrose 5%. 1000 milliLiter(s) (50 mL/Hr) IV Continuous <Continuous>  dextrose 5%. 1000 milliLiter(s) (100 mL/Hr) IV Continuous <Continuous>  dextrose 50% Injectable 25 Gram(s) IV Push once  dextrose 50% Injectable 12.5 Gram(s) IV Push once  dextrose 50% Injectable 25 Gram(s) IV Push once  doxazosin 2 milliGRAM(s) Oral at bedtime  escitalopram 10 milliGRAM(s) Oral daily  glucagon  Injectable 1 milliGRAM(s) IntraMuscular once  heparin   Injectable 5000 Unit(s) SubCutaneous every 8 hours  insulin glargine Injectable (LANTUS) 12 Unit(s) SubCutaneous <User Schedule>  insulin lispro (ADMELOG) corrective regimen sliding scale   SubCutaneous every 6 hours  levETIRAcetam  IVPB 500 milliGRAM(s) IV Intermittent every 12 hours  midodrine 30 milliGRAM(s) Oral every 8 hours  mupirocin 2% Ointment 1 Application(s) Topical two times a day  pantoprazole  Injectable 40 milliGRAM(s) IV Push every 12 hours  petrolatum Ophthalmic Ointment 1 Application(s) Left EYE at bedtime  piperacillin/tazobactam IVPB.. 3.375 Gram(s) IV Intermittent every 8 hours  sodium chloride 3%  Inhalation 4 milliLiter(s) Inhalation two times a day  sucralfate suspension 1 Gram(s) Enteral Tube two times a day  tranexamic acid Injectable for Topical Use 10 milliLiter(s) Topical once    MEDICATIONS  (PRN):  dextrose Oral Gel 15 Gram(s) Oral once PRN Blood Glucose LESS THAN 70 milliGRAM(s)/deciliter        LABS                                            8.1                   Neurophils% (auto):   59.3   (02-16 @ 00:30):    7.85 )-----------(318          Lymphocytes% (auto):  18.5                                          24.8                   Eosinphils% (auto):   14.4     Manual%: Neutrophils x    ; Lymphocytes x    ; Eosinophils x    ; Bands%: x    ; Blasts x                                    139    |  99     |  23                  Calcium: 7.9   / iCa: x      (02-16 @ 00:30)    ----------------------------<  184       Magnesium: 2.10                             3.7     |  26     |  1.67             Phosphorous: 3.6      TPro  6.3    /  Alb  2.2    /  TBili  0.3    /  DBili  x      /  AST  28     /  ALT  21     /  AlkPhos  86     16 Feb 2024 00:30    ( 02-16 @ 00:30 )   PT: 12.4 sec;   INR: 1.10 ratio  aPTT: 30.5 sec MICU Transfer Note    Transfer from: MICU  Transfer to:  (  ) Medicine    (  ) Telemetry    ( X ) RCU    (  ) Palliative    (  ) Stroke Unit    (  ) _______________    Going to room 648 in RCU    Accepting physician:    HPI:  89 y/o M w history of CAD s/p CABG s/p stents (last stent May 2022), s/p PPM, DM2, CKD (baseline Cr 1.2-1.3 as per family), PVD, HTN, HLD, CVA x3 (without residual deficits), and 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 Of note, family states that the patient has had worsening confusion at home over the past 6 months (becoming disoriented to time) but recently has been more confused, talking about seeing people that are not present. On arrival to the ED his vitals were T 98 -> 99.2, P 80, /72, RR 18, ) sat 100% RA. His lab work showed leukocytosis with neutrophilia and bandemia, MABLE, mild hyponatremia, indeterminant but stable troponin, and elevated lactate to 2.3. His COVID19 swab was positive. His CXR showed bibasilar crackles. Pt's hospital course complicated by SMA angiography w SMA calcification with stent placement with diagnostic laparoscopy 12/24, small bowel and visible colon viable, some inflammation of omentum in RUQ. Course c/b GI bleed upper endoscopy showing grade b esophagitis, bleeding Dieulafoy's lesion, s/p scope on 1/2 with GI with clips placed. Pt received 1 dose of remdesivir for COVID +. Rapid response was called on 1/19 for increased WOB, Pulmonary seen at bedside, pt on HFNC but transferred to MICU for monitoring.     MICU COURSE:  Pt was intubated on 1/22 for AHRF, extubated 2/1, reintubated 2/2. Course complicated by acute cholecystitis, managed with perc asa. Pt's course was complicated by poor neurologic recovery and per family, was opted to perform trach, which was done on 2/13. Now pending PEG but having recurrent fevers - per ID consulted, stress vs. occult infection - on zosyn for empiric course, now stable, pending PEG re-evaluation by GI next week. on 2/20. Pressors weaned as Propofol weaned, now stable for RCU.     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     PLAN  =====Neurologic=====  #CVA  - prior CVA x3 with no residual deficits    #myoclonic jerks  - on Keppra 500 mg BID, per neuro wishing to wean however per family request will continue at 500 mg BID, but now off valproate per neuro    - also per neuro, use precedex as needed for clinical sedation    - Started prop on 2/10, in anticipation of procedure  - holding home gabapentin and memantine in setting of somnolence  - continue lexapro     #AMS  - CT/CTA negative for stroke. EEG now off   - MRI brain - unremarkable findings. Family now amenable to Trach/PEG.     =====Pulmonary=====    #COVID  - s/p paxlovid and 1 dose remdesivir while inpatient    #Pleural effusions b/l  - CT chest from 1/16 showing new small bilateral pleural effusions/ atelectasis/ patchy bilateral ground-glass opacities are consistent with pulmonary edema.  - Diuresis PRN based on amount of pleural effusions on POCUS    #AHRF  - Intubated for airway protection in s/o AMS. Sedated  - Holding brilinta for possible procedure. Will need to restart ASAP after trach/PEG  - s/p zosyn for aspiration PNA from 1/20- 1/28   - MRSA Swab positive for Staph Aureus only, started on Mupirocin 2/13-  - Restarted on Zosyn. BAL culture negative  - Trach 2/13/2024.     =====Cardiovascular=====  Midodrine 30 q8h, wean prop as tolerated to wean pressors    #HTN  - on home amlodipine and metoprolol currently holding     #CAD  - on Brilinta (holding) aspirin and ranolazine continue   - as per vascular okay to hold Brilinta for possible pleural effusion thoracentesis  -have not heard back from cardiology regarding Brilinta / last stent 2022 , will hold   -Need to restart brilinta ASAP after Trach/PEG    #Afib  - pt with known history of pAF, first observed 2/1  - can consider starting AC and/or rate control if persists     =====GI=====  #upper GI bleed  - s/p EGD showing dieulafoy lesion and esophagitis likely 2/2 NGT irritation s/p 2 clips  - on protonix IV BID continue   - CT on 1/25 with cholelithiasis and sludge HIDA on 1/26 confirming likely acute choley, s/p IR perc cholecystostomy 1/26   - Discussed w/ family, want trach/PEG. Discussed w/ GI - Only will do PEG 24-48 hours after trach placed. Recommend reaching out when trach date is set  - Trach 2/13, per GI PEG only after afebrile x48 hours. Next eval would be Tuesday, 2/20. Family demanding second opinion and frustrated w/ inability to place PEG, reached out to GI re: second opinion or private to see patient for PEG.     #Diet  - tube feeds NGT   - Per GI, no PEG today but unclear when GI will place PEG, will get back re: timing    =====Renal/=====  #Electrolytes  - Maintain K>4, Phos>3, Mag>2, iCal>1  - baseline cr 1.5, at baseline  - on free water flushes for hyperNa  - Diuresis PRN   - TOV 2/16    =====Endocrine=====  #DM2  - on lantus 12U and q6 SSI  - a1c 9.3, glucose wnl inpatient     =====Infectious Disease=====  #COVID   - s/p paxlovid and 1 dose remdesivir  # PNA  - CT chest showing ground glass opacities  - s/p zosyn  - intermittent episodes of fevers, likely source GI given choleycystitis? culture NGTD  - meropenem 5d course thru 2/1   - now monitoring off antibiotics  - newly hypothermic on 2/11, on Zosyn, unclear source, work-up unrevealing, plan for 7-day course    =====Heme/Onc=====  #DVT Ppx  - heparin sub-q held in s/o procedure, restart when able    =====Ethics=====  FULL CODE.  After family discussion, Trach/PEG    For Follow-Up:  [  ] Complete Zosyn for empiric course x7 days (2/12-2/19)   [  ] NPO at [facility] on Monday, 1/19 for PEG. GI may place if pt afebrile 48 hrs. Stop heparin SubQ beforehand.   [  ] Restart Brilinta and Heparin SubQ given pt's hx of stents (Last 5/2022) once PEG placed  [  ] f/u TOV done on 2/16  [  ] Diuresis PRN to keep net even  [  ] Reach out to IR once PEG placed re: pulling cholecystostomy tube  [  ] Continue BID protonix dosing due to prior GIB    Vital Signs Last 24 Hrs  T(C): 37.7 (16 Feb 2024 12:00), Max: 37.8 (16 Feb 2024 08:00)  T(F): 99.9 (16 Feb 2024 12:00), Max: 100 (16 Feb 2024 08:00)  HR: 101 (16 Feb 2024 12:00) (88 - 105)  BP: 109/42 (16 Feb 2024 12:00) (92/34 - 131/53)  BP(mean): 59 (16 Feb 2024 12:00) (49 - 73)  RR: 12 (16 Feb 2024 12:00) (12 - 22)  SpO2: 97% (16 Feb 2024 12:00) (96% - 100%)    Parameters below as of 16 Feb 2024 12:00  Patient On (Oxygen Delivery Method): ventilator    O2 Concentration (%): 30  I&O's Summary    15 Feb 2024 07:01 - 16 Feb 2024 07:00  --------------------------------------------------------  IN: 2098.1 mL / OUT: 1780 mL / NET: 318.1 mL    16 Feb 2024 07:01  -  16 Feb 2024 14:15  --------------------------------------------------------  IN: 325 mL / OUT: 170 mL / NET: 155 mL          MEDICATIONS  (STANDING):  albuterol/ipratropium for Nebulization 3 milliLiter(s) Nebulizer every 6 hours  artificial  tears Solution 1 Drop(s) Left EYE four times a day  aspirin  chewable 81 milliGRAM(s) Enteral Tube daily  chlorhexidine 0.12% Liquid 15 milliLiter(s) Oral Mucosa every 12 hours  chlorhexidine 2% Cloths 1 Application(s) Topical daily  dextrose 5%. 1000 milliLiter(s) (50 mL/Hr) IV Continuous <Continuous>  dextrose 5%. 1000 milliLiter(s) (100 mL/Hr) IV Continuous <Continuous>  dextrose 50% Injectable 25 Gram(s) IV Push once  dextrose 50% Injectable 12.5 Gram(s) IV Push once  dextrose 50% Injectable 25 Gram(s) IV Push once  doxazosin 2 milliGRAM(s) Oral at bedtime  escitalopram 10 milliGRAM(s) Oral daily  glucagon  Injectable 1 milliGRAM(s) IntraMuscular once  heparin   Injectable 5000 Unit(s) SubCutaneous every 8 hours  insulin glargine Injectable (LANTUS) 12 Unit(s) SubCutaneous <User Schedule>  insulin lispro (ADMELOG) corrective regimen sliding scale   SubCutaneous every 6 hours  levETIRAcetam  IVPB 500 milliGRAM(s) IV Intermittent every 12 hours  midodrine 30 milliGRAM(s) Oral every 8 hours  mupirocin 2% Ointment 1 Application(s) Topical two times a day  pantoprazole  Injectable 40 milliGRAM(s) IV Push every 12 hours  petrolatum Ophthalmic Ointment 1 Application(s) Left EYE at bedtime  piperacillin/tazobactam IVPB.. 3.375 Gram(s) IV Intermittent every 8 hours  sodium chloride 3%  Inhalation 4 milliLiter(s) Inhalation two times a day  sucralfate suspension 1 Gram(s) Enteral Tube two times a day  tranexamic acid Injectable for Topical Use 10 milliLiter(s) Topical once    MEDICATIONS  (PRN):  dextrose Oral Gel 15 Gram(s) Oral once PRN Blood Glucose LESS THAN 70 milliGRAM(s)/deciliter        LABS                                            8.1                   Neurophils% (auto):   59.3   (02-16 @ 00:30):    7.85 )-----------(318          Lymphocytes% (auto):  18.5                                          24.8                   Eosinphils% (auto):   14.4     Manual%: Neutrophils x    ; Lymphocytes x    ; Eosinophils x    ; Bands%: x    ; Blasts x                                    139    |  99     |  23                  Calcium: 7.9   / iCa: x      (02-16 @ 00:30)    ----------------------------<  184       Magnesium: 2.10                             3.7     |  26     |  1.67             Phosphorous: 3.6      TPro  6.3    /  Alb  2.2    /  TBili  0.3    /  DBili  x      /  AST  28     /  ALT  21     /  AlkPhos  86     16 Feb 2024 00:30    ( 02-16 @ 00:30 )   PT: 12.4 sec;   INR: 1.10 ratio  aPTT: 30.5 sec MICU Transfer Note    Transfer from: MICU  Transfer to:  (  ) Medicine    (  ) Telemetry    ( X ) RCU    (  ) Palliative    (  ) Stroke Unit    (  ) _______________    Going to room 648 in RCU    Accepting physician: Dr. 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 Of note, family states that the patient has had worsening confusion at home over the past 6 months (becoming disoriented to time) but recently has been more confused, talking about seeing people that are not present. On arrival to the ED his vitals were T 98 -> 99.2, P 80, /72, RR 18, ) sat 100% RA. His lab work showed leukocytosis with neutrophilia and bandemia, MABLE, mild hyponatremia, indeterminant but stable troponin, and elevated lactate to 2.3. His COVID19 swab was positive. His CXR showed bibasilar crackles. Pt's hospital course complicated by SMA angiography w SMA calcification with stent placement with diagnostic laparoscopy 12/24, small bowel and visible colon viable, some inflammation of omentum in RUQ. Course c/b GI bleed upper endoscopy showing grade b esophagitis, bleeding Dieulafoy's lesion, s/p scope on 1/2 with GI with clips placed. Pt received 1 dose of remdesivir for COVID +. Rapid response was called on 1/19 for increased WOB, Pulmonary seen at bedside, pt on HFNC but transferred to MICU for monitoring.     MICU COURSE:  Pt was intubated on 1/22 for AHRF, extubated 2/1, reintubated 2/2. Course complicated by acute cholecystitis, managed with perc asa. Pt's course was complicated by poor neurologic recovery and per family, was opted to perform trach, which was done on 2/13. Now pending PEG but having recurrent fevers - per ID consulted, stress vs. occult infection - on zosyn for empiric course, now stable, pending PEG re-evaluation by GI next week. on 2/20. Pressors weaned as Propofol weaned, now stable for RCU.     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     PLAN  =====Neurologic=====  #CVA  - prior CVA x3 with no residual deficits    #myoclonic jerks  - on Keppra 500 mg BID, per neuro wishing to wean however per family request will continue at 500 mg BID, but now off valproate per neuro    - also per neuro, use precedex as needed for clinical sedation    - Started prop on 2/10, in anticipation of procedure  - holding home gabapentin and memantine in setting of somnolence  - continue lexapro     #AMS  - CT/CTA negative for stroke. EEG now off   - MRI brain - unremarkable findings. Family now amenable to Trach/PEG.     =====Pulmonary=====    #COVID  - s/p paxlovid and 1 dose remdesivir while inpatient    #Pleural effusions b/l  - CT chest from 1/16 showing new small bilateral pleural effusions/ atelectasis/ patchy bilateral ground-glass opacities are consistent with pulmonary edema.  - Diuresis PRN based on amount of pleural effusions on POCUS    #AHRF  - Intubated for airway protection in s/o AMS. Sedated  - Holding brilinta for possible procedure. Will need to restart ASAP after trach/PEG  - s/p zosyn for aspiration PNA from 1/20- 1/28   - MRSA Swab positive for Staph Aureus only, started on Mupirocin 2/13-  - Restarted on Zosyn. BAL culture negative  - Trach 2/13/2024.     =====Cardiovascular=====  Midodrine 30 q8h, wean prop as tolerated to wean pressors    #HTN  - on home amlodipine and metoprolol currently holding     #CAD  - on Brilinta (holding) aspirin and ranolazine continue   - as per vascular okay to hold Brilinta for possible pleural effusion thoracentesis  -have not heard back from cardiology regarding Brilinta / last stent 2022 , will hold   -Need to restart brilinta ASAP after Trach/PEG    #Afib  - pt with known history of pAF, first observed 2/1  - can consider starting AC and/or rate control if persists     =====GI=====  #upper GI bleed  - s/p EGD showing dieulafoy lesion and esophagitis likely 2/2 NGT irritation s/p 2 clips  - on protonix IV BID continue   - CT on 1/25 with cholelithiasis and sludge HIDA on 1/26 confirming likely acute choley, s/p IR perc cholecystostomy 1/26   - Discussed w/ family, want trach/PEG. Discussed w/ GI - Only will do PEG 24-48 hours after trach placed. Recommend reaching out when trach date is set  - Trach 2/13, per GI PEG only after afebrile x48 hours. Next eval would be Tuesday, 2/20. Family demanding second opinion and frustrated w/ inability to place PEG, reached out to GI re: second opinion or private to see patient for PEG.     #Diet  - tube feeds NGT   - Per GI, no PEG today but unclear when GI will place PEG, will get back re: timing    =====Renal/=====  #Electrolytes  - Maintain K>4, Phos>3, Mag>2, iCal>1  - baseline cr 1.5, at baseline  - on free water flushes for hyperNa  - Diuresis PRN   - TOV 2/16    =====Endocrine=====  #DM2  - on lantus 12U and q6 SSI  - a1c 9.3, glucose wnl inpatient     =====Infectious Disease=====  #COVID   - s/p paxlovid and 1 dose remdesivir  # PNA  - CT chest showing ground glass opacities  - s/p zosyn  - intermittent episodes of fevers, likely source GI given choleycystitis? culture NGTD  - meropenem 5d course thru 2/1   - now monitoring off antibiotics  - newly hypothermic on 2/11, on Zosyn, unclear source, work-up unrevealing, plan for 7-day course    =====Heme/Onc=====  #DVT Ppx  - heparin sub-q held in s/o procedure, restart when able    =====Ethics=====  FULL CODE.  After family discussion, Trach/PEG    For Follow-Up:  [  ] Complete Zosyn for empiric course x7 days (2/12-2/19)   [  ] NPO at [facility] on Monday, 1/19 for PEG. GI may place if pt afebrile 48 hrs. Stop heparin SubQ beforehand.   [  ] Restart Brilinta and Heparin SubQ given pt's hx of stents (Last 5/2022) once PEG placed  [  ] f/u TOV done on 2/16  [  ] Diuresis PRN to keep net even  [  ] Reach out to IR once PEG placed re: pulling cholecystostomy tube  [  ] Continue BID protonix dosing due to prior GIB    Vital Signs Last 24 Hrs  T(C): 37.7 (16 Feb 2024 12:00), Max: 37.8 (16 Feb 2024 08:00)  T(F): 99.9 (16 Feb 2024 12:00), Max: 100 (16 Feb 2024 08:00)  HR: 101 (16 Feb 2024 12:00) (88 - 105)  BP: 109/42 (16 Feb 2024 12:00) (92/34 - 131/53)  BP(mean): 59 (16 Feb 2024 12:00) (49 - 73)  RR: 12 (16 Feb 2024 12:00) (12 - 22)  SpO2: 97% (16 Feb 2024 12:00) (96% - 100%)    Parameters below as of 16 Feb 2024 12:00  Patient On (Oxygen Delivery Method): ventilator    O2 Concentration (%): 30  I&O's Summary    15 Feb 2024 07:01  -  16 Feb 2024 07:00  --------------------------------------------------------  IN: 2098.1 mL / OUT: 1780 mL / NET: 318.1 mL    16 Feb 2024 07:01  -  16 Feb 2024 14:15  --------------------------------------------------------  IN: 325 mL / OUT: 170 mL / NET: 155 mL          MEDICATIONS  (STANDING):  albuterol/ipratropium for Nebulization 3 milliLiter(s) Nebulizer every 6 hours  artificial  tears Solution 1 Drop(s) Left EYE four times a day  aspirin  chewable 81 milliGRAM(s) Enteral Tube daily  chlorhexidine 0.12% Liquid 15 milliLiter(s) Oral Mucosa every 12 hours  chlorhexidine 2% Cloths 1 Application(s) Topical daily  dextrose 5%. 1000 milliLiter(s) (50 mL/Hr) IV Continuous <Continuous>  dextrose 5%. 1000 milliLiter(s) (100 mL/Hr) IV Continuous <Continuous>  dextrose 50% Injectable 25 Gram(s) IV Push once  dextrose 50% Injectable 12.5 Gram(s) IV Push once  dextrose 50% Injectable 25 Gram(s) IV Push once  doxazosin 2 milliGRAM(s) Oral at bedtime  escitalopram 10 milliGRAM(s) Oral daily  glucagon  Injectable 1 milliGRAM(s) IntraMuscular once  heparin   Injectable 5000 Unit(s) SubCutaneous every 8 hours  insulin glargine Injectable (LANTUS) 12 Unit(s) SubCutaneous <User Schedule>  insulin lispro (ADMELOG) corrective regimen sliding scale   SubCutaneous every 6 hours  levETIRAcetam  IVPB 500 milliGRAM(s) IV Intermittent every 12 hours  midodrine 30 milliGRAM(s) Oral every 8 hours  mupirocin 2% Ointment 1 Application(s) Topical two times a day  pantoprazole  Injectable 40 milliGRAM(s) IV Push every 12 hours  petrolatum Ophthalmic Ointment 1 Application(s) Left EYE at bedtime  piperacillin/tazobactam IVPB.. 3.375 Gram(s) IV Intermittent every 8 hours  sodium chloride 3%  Inhalation 4 milliLiter(s) Inhalation two times a day  sucralfate suspension 1 Gram(s) Enteral Tube two times a day  tranexamic acid Injectable for Topical Use 10 milliLiter(s) Topical once    MEDICATIONS  (PRN):  dextrose Oral Gel 15 Gram(s) Oral once PRN Blood Glucose LESS THAN 70 milliGRAM(s)/deciliter        LABS                                            8.1                   Neurophils% (auto):   59.3   (02-16 @ 00:30):    7.85 )-----------(318          Lymphocytes% (auto):  18.5                                          24.8                   Eosinphils% (auto):   14.4     Manual%: Neutrophils x    ; Lymphocytes x    ; Eosinophils x    ; Bands%: x    ; Blasts x                                    139    |  99     |  23                  Calcium: 7.9   / iCa: x      (02-16 @ 00:30)    ----------------------------<  184       Magnesium: 2.10                             3.7     |  26     |  1.67             Phosphorous: 3.6      TPro  6.3    /  Alb  2.2    /  TBili  0.3    /  DBili  x      /  AST  28     /  ALT  21     /  AlkPhos  86     16 Feb 2024 00:30    ( 02-16 @ 00:30 )   PT: 12.4 sec;   INR: 1.10 ratio  aPTT: 30.5 sec

## 2024-02-17 LAB
ANION GAP SERPL CALC-SCNC: 13 MMOL/L — SIGNIFICANT CHANGE UP (ref 7–14)
APTT BLD: 31.9 SEC — SIGNIFICANT CHANGE UP (ref 24.5–35.6)
BASOPHILS # BLD AUTO: 0.04 K/UL — SIGNIFICANT CHANGE UP (ref 0–0.2)
BASOPHILS NFR BLD AUTO: 0.4 % — SIGNIFICANT CHANGE UP (ref 0–2)
BUN SERPL-MCNC: 23 MG/DL — SIGNIFICANT CHANGE UP (ref 7–23)
CALCIUM SERPL-MCNC: 8.2 MG/DL — LOW (ref 8.4–10.5)
CHLORIDE SERPL-SCNC: 102 MMOL/L — SIGNIFICANT CHANGE UP (ref 98–107)
CO2 SERPL-SCNC: 26 MMOL/L — SIGNIFICANT CHANGE UP (ref 22–31)
CREAT SERPL-MCNC: 1.64 MG/DL — HIGH (ref 0.5–1.3)
D DIMER BLD IA.RAPID-MCNC: 668 NG/ML DDU — HIGH
EGFR: 39 ML/MIN/1.73M2 — LOW
EOSINOPHIL # BLD AUTO: 1.09 K/UL — HIGH (ref 0–0.5)
EOSINOPHIL NFR BLD AUTO: 11.4 % — HIGH (ref 0–6)
FIBRINOGEN PPP-MCNC: 549 MG/DL — HIGH (ref 200–465)
GLUCOSE BLDC GLUCOMTR-MCNC: 141 MG/DL — HIGH (ref 70–99)
GLUCOSE BLDC GLUCOMTR-MCNC: 142 MG/DL — HIGH (ref 70–99)
GLUCOSE BLDC GLUCOMTR-MCNC: 154 MG/DL — HIGH (ref 70–99)
GLUCOSE BLDC GLUCOMTR-MCNC: 160 MG/DL — HIGH (ref 70–99)
GLUCOSE BLDC GLUCOMTR-MCNC: 181 MG/DL — HIGH (ref 70–99)
GLUCOSE SERPL-MCNC: 151 MG/DL — HIGH (ref 70–99)
HCT VFR BLD CALC: 27.7 % — LOW (ref 39–50)
HCT VFR BLD CALC: 28.9 % — LOW (ref 39–50)
HGB BLD-MCNC: 8.6 G/DL — LOW (ref 13–17)
HGB BLD-MCNC: 9.3 G/DL — LOW (ref 13–17)
IANC: 5.61 K/UL — SIGNIFICANT CHANGE UP (ref 1.8–7.4)
IMM GRANULOCYTES NFR BLD AUTO: 0.9 % — SIGNIFICANT CHANGE UP (ref 0–0.9)
INR BLD: 1.03 RATIO — SIGNIFICANT CHANGE UP (ref 0.85–1.18)
LYMPHOCYTES # BLD AUTO: 2.07 K/UL — SIGNIFICANT CHANGE UP (ref 1–3.3)
LYMPHOCYTES # BLD AUTO: 21.6 % — SIGNIFICANT CHANGE UP (ref 13–44)
MAGNESIUM SERPL-MCNC: 2.1 MG/DL — SIGNIFICANT CHANGE UP (ref 1.6–2.6)
MCHC RBC-ENTMCNC: 28.5 PG — SIGNIFICANT CHANGE UP (ref 27–34)
MCHC RBC-ENTMCNC: 29.4 PG — SIGNIFICANT CHANGE UP (ref 27–34)
MCHC RBC-ENTMCNC: 31 GM/DL — LOW (ref 32–36)
MCHC RBC-ENTMCNC: 32.2 GM/DL — SIGNIFICANT CHANGE UP (ref 32–36)
MCV RBC AUTO: 91.5 FL — SIGNIFICANT CHANGE UP (ref 80–100)
MCV RBC AUTO: 91.7 FL — SIGNIFICANT CHANGE UP (ref 80–100)
MONOCYTES # BLD AUTO: 0.7 K/UL — SIGNIFICANT CHANGE UP (ref 0–0.9)
MONOCYTES NFR BLD AUTO: 7.3 % — SIGNIFICANT CHANGE UP (ref 2–14)
NEUTROPHILS # BLD AUTO: 5.61 K/UL — SIGNIFICANT CHANGE UP (ref 1.8–7.4)
NEUTROPHILS NFR BLD AUTO: 58.4 % — SIGNIFICANT CHANGE UP (ref 43–77)
NRBC # BLD: 0 /100 WBCS — SIGNIFICANT CHANGE UP (ref 0–0)
NRBC # BLD: 0 /100 WBCS — SIGNIFICANT CHANGE UP (ref 0–0)
NRBC # FLD: 0 K/UL — SIGNIFICANT CHANGE UP (ref 0–0)
NRBC # FLD: 0 K/UL — SIGNIFICANT CHANGE UP (ref 0–0)
PHOSPHATE SERPL-MCNC: 2.7 MG/DL — SIGNIFICANT CHANGE UP (ref 2.5–4.5)
PLATELET # BLD AUTO: 303 K/UL — SIGNIFICANT CHANGE UP (ref 150–400)
PLATELET # BLD AUTO: 307 K/UL — SIGNIFICANT CHANGE UP (ref 150–400)
POTASSIUM SERPL-MCNC: 4 MMOL/L — SIGNIFICANT CHANGE UP (ref 3.5–5.3)
POTASSIUM SERPL-SCNC: 4 MMOL/L — SIGNIFICANT CHANGE UP (ref 3.5–5.3)
PROTHROM AB SERPL-ACNC: 11.6 SEC — SIGNIFICANT CHANGE UP (ref 9.5–13)
RBC # BLD: 3.02 M/UL — LOW (ref 4.2–5.8)
RBC # BLD: 3.16 M/UL — LOW (ref 4.2–5.8)
RBC # FLD: 17.4 % — HIGH (ref 10.3–14.5)
RBC # FLD: 17.8 % — HIGH (ref 10.3–14.5)
SODIUM SERPL-SCNC: 141 MMOL/L — SIGNIFICANT CHANGE UP (ref 135–145)
WBC # BLD: 10.95 K/UL — HIGH (ref 3.8–10.5)
WBC # BLD: 9.6 K/UL — SIGNIFICANT CHANGE UP (ref 3.8–10.5)
WBC # FLD AUTO: 10.95 K/UL — HIGH (ref 3.8–10.5)
WBC # FLD AUTO: 9.6 K/UL — SIGNIFICANT CHANGE UP (ref 3.8–10.5)

## 2024-02-17 PROCEDURE — 99233 SBSQ HOSP IP/OBS HIGH 50: CPT

## 2024-02-17 PROCEDURE — 71045 X-RAY EXAM CHEST 1 VIEW: CPT | Mod: 26

## 2024-02-17 RX ORDER — TRANEXAMIC ACID 100 MG/ML
500 INJECTION, SOLUTION INTRAVENOUS EVERY 8 HOURS
Refills: 0 | Status: DISCONTINUED | OUTPATIENT
Start: 2024-02-17 | End: 2024-02-19

## 2024-02-17 RX ORDER — TRANEXAMIC ACID 100 MG/ML
500 INJECTION, SOLUTION INTRAVENOUS ONCE
Refills: 0 | Status: COMPLETED | OUTPATIENT
Start: 2024-02-17 | End: 2024-02-17

## 2024-02-17 RX ADMIN — LEVETIRACETAM 400 MILLIGRAM(S): 250 TABLET, FILM COATED ORAL at 17:00

## 2024-02-17 RX ADMIN — Medication 1 GRAM(S): at 06:43

## 2024-02-17 RX ADMIN — HEPARIN SODIUM 5000 UNIT(S): 5000 INJECTION INTRAVENOUS; SUBCUTANEOUS at 05:53

## 2024-02-17 RX ADMIN — CHLORHEXIDINE GLUCONATE 15 MILLILITER(S): 213 SOLUTION TOPICAL at 05:30

## 2024-02-17 RX ADMIN — Medication 2: at 11:32

## 2024-02-17 RX ADMIN — Medication 2: at 23:40

## 2024-02-17 RX ADMIN — Medication 81 MILLIGRAM(S): at 11:29

## 2024-02-17 RX ADMIN — INSULIN GLARGINE 12 UNIT(S): 100 INJECTION, SOLUTION SUBCUTANEOUS at 11:28

## 2024-02-17 RX ADMIN — SODIUM CHLORIDE 4 MILLILITER(S): 9 INJECTION INTRAMUSCULAR; INTRAVENOUS; SUBCUTANEOUS at 10:36

## 2024-02-17 RX ADMIN — ESCITALOPRAM OXALATE 10 MILLIGRAM(S): 10 TABLET, FILM COATED ORAL at 11:32

## 2024-02-17 RX ADMIN — MIDODRINE HYDROCHLORIDE 30 MILLIGRAM(S): 2.5 TABLET ORAL at 05:31

## 2024-02-17 RX ADMIN — MUPIROCIN 1 APPLICATION(S): 20 OINTMENT TOPICAL at 17:01

## 2024-02-17 RX ADMIN — Medication 3 MILLILITER(S): at 20:15

## 2024-02-17 RX ADMIN — Medication 1 DROP(S): at 17:01

## 2024-02-17 RX ADMIN — Medication 2: at 05:36

## 2024-02-17 RX ADMIN — PANTOPRAZOLE SODIUM 40 MILLIGRAM(S): 20 TABLET, DELAYED RELEASE ORAL at 17:01

## 2024-02-17 RX ADMIN — PANTOPRAZOLE SODIUM 40 MILLIGRAM(S): 20 TABLET, DELAYED RELEASE ORAL at 05:34

## 2024-02-17 RX ADMIN — Medication 1 GRAM(S): at 17:01

## 2024-02-17 RX ADMIN — Medication 1 APPLICATION(S): at 21:11

## 2024-02-17 RX ADMIN — HEPARIN SODIUM 5000 UNIT(S): 5000 INJECTION INTRAVENOUS; SUBCUTANEOUS at 13:07

## 2024-02-17 RX ADMIN — MIDODRINE HYDROCHLORIDE 30 MILLIGRAM(S): 2.5 TABLET ORAL at 21:12

## 2024-02-17 RX ADMIN — Medication 2 MILLIGRAM(S): at 21:11

## 2024-02-17 RX ADMIN — Medication 1 DROP(S): at 23:45

## 2024-02-17 RX ADMIN — LEVETIRACETAM 400 MILLIGRAM(S): 250 TABLET, FILM COATED ORAL at 05:08

## 2024-02-17 RX ADMIN — Medication 1 DROP(S): at 11:28

## 2024-02-17 RX ADMIN — SODIUM CHLORIDE 4 MILLILITER(S): 9 INJECTION INTRAMUSCULAR; INTRAVENOUS; SUBCUTANEOUS at 20:15

## 2024-02-17 RX ADMIN — Medication 1 DROP(S): at 05:46

## 2024-02-17 RX ADMIN — TRANEXAMIC ACID 500 MILLIGRAM(S): 100 INJECTION, SOLUTION INTRAVENOUS at 20:11

## 2024-02-17 RX ADMIN — CHLORHEXIDINE GLUCONATE 15 MILLILITER(S): 213 SOLUTION TOPICAL at 17:01

## 2024-02-17 RX ADMIN — Medication 1 DROP(S): at 00:10

## 2024-02-17 RX ADMIN — MIDODRINE HYDROCHLORIDE 30 MILLIGRAM(S): 2.5 TABLET ORAL at 13:07

## 2024-02-17 RX ADMIN — MUPIROCIN 1 APPLICATION(S): 20 OINTMENT TOPICAL at 05:18

## 2024-02-17 RX ADMIN — Medication 3 MILLILITER(S): at 03:44

## 2024-02-17 RX ADMIN — Medication 3 MILLILITER(S): at 16:21

## 2024-02-17 RX ADMIN — PIPERACILLIN AND TAZOBACTAM 25 GRAM(S): 4; .5 INJECTION, POWDER, LYOPHILIZED, FOR SOLUTION INTRAVENOUS at 01:28

## 2024-02-17 RX ADMIN — PIPERACILLIN AND TAZOBACTAM 25 GRAM(S): 4; .5 INJECTION, POWDER, LYOPHILIZED, FOR SOLUTION INTRAVENOUS at 17:00

## 2024-02-17 RX ADMIN — PIPERACILLIN AND TAZOBACTAM 25 GRAM(S): 4; .5 INJECTION, POWDER, LYOPHILIZED, FOR SOLUTION INTRAVENOUS at 09:12

## 2024-02-17 RX ADMIN — Medication 3 MILLILITER(S): at 10:36

## 2024-02-17 NOTE — PROGRESS NOTE ADULT - SUBJECTIVE AND OBJECTIVE BOX
CHIEF COMPLAINT:Patient is a 89y old  Male who presents with a chief complaint of COVID19, Chest pain (16 Feb 2024 14:39)      INTERVAL EVENTS:     ROS: Seen by bedside during AM rounds     OBJECTIVE:  ICU Vital Signs Last 24 Hrs  T(C): 37 (17 Feb 2024 04:00), Max: 38.1 (16 Feb 2024 16:40)  T(F): 98.6 (17 Feb 2024 04:00), Max: 100.5 (16 Feb 2024 16:40)  HR: 88 (17 Feb 2024 04:00) (88 - 127)  BP: 116/55 (17 Feb 2024 04:00) (108/59 - 128/52)  BP(mean): 69 (16 Feb 2024 19:00) (59 - 72)  ABP: --  ABP(mean): --  RR: 12 (17 Feb 2024 04:00) (12 - 20)  SpO2: 100% (17 Feb 2024 04:00) (96% - 100%)    O2 Parameters below as of 17 Feb 2024 04:00  Patient On (Oxygen Delivery Method): ventilator    O2 Concentration (%): 30      Mode: AC/ CMV (Assist Control/ Continuous Mandatory Ventilation), RR (machine): 12, TV (machine): 450, FiO2: 30, PEEP: 5, ITime: 0.74, MAP: 10, PIP: 29    02-16 @ 07:01  -  02-17 @ 07:00  --------------------------------------------------------  IN: 1480 mL / OUT: 435 mL / NET: 1045 mL      CAPILLARY BLOOD GLUCOSE      POCT Blood Glucose.: 160 mg/dL (17 Feb 2024 05:13)      PHYSICAL EXAM:  General:   HEENT:   Lymph Nodes:  Neck:   Respiratory:   Cardiovascular:   Abdomen:   Extremities:   Skin:   Neurological:  Psychiatry:    Mode: AC/ CMV (Assist Control/ Continuous Mandatory Ventilation)  RR (machine): 12  TV (machine): 450  FiO2: 30  PEEP: 5  ITime: 0.74  MAP: 10  PIP: 29      HOSPITAL MEDICATIONS:  MEDICATIONS  (STANDING):  albuterol/ipratropium for Nebulization 3 milliLiter(s) Nebulizer every 6 hours  artificial  tears Solution 1 Drop(s) Left EYE four times a day  aspirin  chewable 81 milliGRAM(s) Enteral Tube daily  chlorhexidine 0.12% Liquid 15 milliLiter(s) Oral Mucosa every 12 hours  dextrose 5%. 1000 milliLiter(s) (100 mL/Hr) IV Continuous <Continuous>  dextrose 5%. 1000 milliLiter(s) (50 mL/Hr) IV Continuous <Continuous>  dextrose 50% Injectable 25 Gram(s) IV Push once  dextrose 50% Injectable 12.5 Gram(s) IV Push once  dextrose 50% Injectable 25 Gram(s) IV Push once  doxazosin 2 milliGRAM(s) Oral at bedtime  escitalopram 10 milliGRAM(s) Oral daily  glucagon  Injectable 1 milliGRAM(s) IntraMuscular once  heparin   Injectable 5000 Unit(s) SubCutaneous every 8 hours  insulin glargine Injectable (LANTUS) 12 Unit(s) SubCutaneous <User Schedule>  insulin lispro (ADMELOG) corrective regimen sliding scale   SubCutaneous every 6 hours  levETIRAcetam  IVPB 500 milliGRAM(s) IV Intermittent every 12 hours  midodrine 30 milliGRAM(s) Oral every 8 hours  mupirocin 2% Ointment 1 Application(s) Topical two times a day  pantoprazole  Injectable 40 milliGRAM(s) IV Push every 12 hours  petrolatum Ophthalmic Ointment 1 Application(s) Left EYE at bedtime  piperacillin/tazobactam IVPB.. 3.375 Gram(s) IV Intermittent every 8 hours  sodium chloride 3%  Inhalation 4 milliLiter(s) Inhalation two times a day  sucralfate suspension 1 Gram(s) Enteral Tube two times a day    MEDICATIONS  (PRN):  dextrose Oral Gel 15 Gram(s) Oral once PRN Blood Glucose LESS THAN 70 milliGRAM(s)/deciliter      LABS:                        8.6    9.60  )-----------( 303      ( 17 Feb 2024 07:02 )             27.7     02-17    141  |  102  |  23  ----------------------------<  151<H>  4.0   |  26  |  1.64<H>    Ca    8.2<L>      17 Feb 2024 07:02  Phos  2.7     02-17  Mg     2.10     02-17    TPro  6.3  /  Alb  2.2<L>  /  TBili  0.3  /  DBili  x   /  AST  28  /  ALT  21  /  AlkPhos  86  02-16    PT/INR - ( 16 Feb 2024 00:30 )   PT: 12.4 sec;   INR: 1.10 ratio         PTT - ( 16 Feb 2024 00:30 )  PTT:30.5 sec  Urinalysis Basic - ( 17 Feb 2024 07:02 )    Color: x / Appearance: x / SG: x / pH: x  Gluc: 151 mg/dL / Ketone: x  / Bili: x / Urobili: x   Blood: x / Protein: x / Nitrite: x   Leuk Esterase: x / RBC: x / WBC x   Sq Epi: x / Non Sq Epi: x / Bacteria: x      Arterial Blood Gas:  02-16 @ 00:30  7.43/44/120/29/99.0/4.4  ABG lactate: --     CHIEF COMPLAINT:Patient is a 89y old  Male who presents with a chief complaint of COVID19, Chest pain (16 Feb 2024 14:39)      INTERVAL EVENTS:     ROS: Seen by bedside during AM rounds     OBJECTIVE:  ICU Vital Signs Last 24 Hrs  T(C): 37 (17 Feb 2024 04:00), Max: 38.1 (16 Feb 2024 16:40)  T(F): 98.6 (17 Feb 2024 04:00), Max: 100.5 (16 Feb 2024 16:40)  HR: 88 (17 Feb 2024 04:00) (88 - 127)  BP: 116/55 (17 Feb 2024 04:00) (108/59 - 128/52)  BP(mean): 69 (16 Feb 2024 19:00) (59 - 72)  ABP: --  ABP(mean): --  RR: 12 (17 Feb 2024 04:00) (12 - 20)  SpO2: 100% (17 Feb 2024 04:00) (96% - 100%)    O2 Parameters below as of 17 Feb 2024 04:00  Patient On (Oxygen Delivery Method): ventilator    O2 Concentration (%): 30      Mode: AC/ CMV (Assist Control/ Continuous Mandatory Ventilation), RR (machine): 12, TV (machine): 450, FiO2: 30, PEEP: 5, ITime: 0.74, MAP: 10, PIP: 29    02-16 @ 07:01  -  02-17 @ 07:00  --------------------------------------------------------  IN: 1480 mL / OUT: 435 mL / NET: 1045 mL      CAPILLARY BLOOD GLUCOSE      POCT Blood Glucose.: 160 mg/dL (17 Feb 2024 05:13)      PHYSICAL EXAM:  General: NAD   HEENT:(+)NGT in R-nare  Neck: (+) Trach tube noted, site c/d/i.  Cards: S1/S2, no murmurs   Pulm: coarse breath sounds. No resp distress.   Abdomen: Soft, NTND.  Extremities: 1-2+ b/l pitting LE edema extending to below knee region.   Neurology: awake, eyes open. PERRL. Nonverbal. Not following commands. Unresponsive.   Skin: warm to touch, color appropriate for ethnicity. Refer to RN assessment for further details.      Mode: AC/ CMV (Assist Control/ Continuous Mandatory Ventilation)  RR (machine): 12  TV (machine): 450  FiO2: 30  PEEP: 5  ITime: 0.74  MAP: 10  PIP: 29      HOSPITAL MEDICATIONS:  MEDICATIONS  (STANDING):  albuterol/ipratropium for Nebulization 3 milliLiter(s) Nebulizer every 6 hours  artificial  tears Solution 1 Drop(s) Left EYE four times a day  aspirin  chewable 81 milliGRAM(s) Enteral Tube daily  chlorhexidine 0.12% Liquid 15 milliLiter(s) Oral Mucosa every 12 hours  dextrose 5%. 1000 milliLiter(s) (100 mL/Hr) IV Continuous <Continuous>  dextrose 5%. 1000 milliLiter(s) (50 mL/Hr) IV Continuous <Continuous>  dextrose 50% Injectable 25 Gram(s) IV Push once  dextrose 50% Injectable 12.5 Gram(s) IV Push once  dextrose 50% Injectable 25 Gram(s) IV Push once  doxazosin 2 milliGRAM(s) Oral at bedtime  escitalopram 10 milliGRAM(s) Oral daily  glucagon  Injectable 1 milliGRAM(s) IntraMuscular once  heparin   Injectable 5000 Unit(s) SubCutaneous every 8 hours  insulin glargine Injectable (LANTUS) 12 Unit(s) SubCutaneous <User Schedule>  insulin lispro (ADMELOG) corrective regimen sliding scale   SubCutaneous every 6 hours  levETIRAcetam  IVPB 500 milliGRAM(s) IV Intermittent every 12 hours  midodrine 30 milliGRAM(s) Oral every 8 hours  mupirocin 2% Ointment 1 Application(s) Topical two times a day  pantoprazole  Injectable 40 milliGRAM(s) IV Push every 12 hours  petrolatum Ophthalmic Ointment 1 Application(s) Left EYE at bedtime  piperacillin/tazobactam IVPB.. 3.375 Gram(s) IV Intermittent every 8 hours  sodium chloride 3%  Inhalation 4 milliLiter(s) Inhalation two times a day  sucralfate suspension 1 Gram(s) Enteral Tube two times a day    MEDICATIONS  (PRN):  dextrose Oral Gel 15 Gram(s) Oral once PRN Blood Glucose LESS THAN 70 milliGRAM(s)/deciliter      LABS:                        8.6    9.60  )-----------( 303      ( 17 Feb 2024 07:02 )             27.7     02-17    141  |  102  |  23  ----------------------------<  151<H>  4.0   |  26  |  1.64<H>    Ca    8.2<L>      17 Feb 2024 07:02  Phos  2.7     02-17  Mg     2.10     02-17    TPro  6.3  /  Alb  2.2<L>  /  TBili  0.3  /  DBili  x   /  AST  28  /  ALT  21  /  AlkPhos  86  02-16    PT/INR - ( 16 Feb 2024 00:30 )   PT: 12.4 sec;   INR: 1.10 ratio         PTT - ( 16 Feb 2024 00:30 )  PTT:30.5 sec  Urinalysis Basic - ( 17 Feb 2024 07:02 )    Color: x / Appearance: x / SG: x / pH: x  Gluc: 151 mg/dL / Ketone: x  / Bili: x / Urobili: x   Blood: x / Protein: x / Nitrite: x   Leuk Esterase: x / RBC: x / WBC x   Sq Epi: x / Non Sq Epi: x / Bacteria: x      Arterial Blood Gas:  02-16 @ 00:30  7.43/44/120/29/99.0/4.4  ABG lactate: --     CHIEF COMPLAINT:Patient is a 89y old  Male who presents with a chief complaint of COVID19, Chest pain (16 Feb 2024 14:39)      INTERVAL EVENTS: no overnight events noted. Transferred from MICU yesterday. Tmax 100.5F. Some diastolic hypotension however MAPs acceptable. On empiric course of Zosyn. Pending eval for PEG next week. Noted with some bloody secretions this morning/afternoon.     ROS: Unable to obtain ROS given patient is minimally responsive.     OBJECTIVE:  ICU Vital Signs Last 24 Hrs  T(C): 37 (17 Feb 2024 04:00), Max: 38.1 (16 Feb 2024 16:40)  T(F): 98.6 (17 Feb 2024 04:00), Max: 100.5 (16 Feb 2024 16:40)  HR: 88 (17 Feb 2024 04:00) (88 - 127)  BP: 116/55 (17 Feb 2024 04:00) (108/59 - 128/52)  BP(mean): 69 (16 Feb 2024 19:00) (59 - 72)  ABP: --  ABP(mean): --  RR: 12 (17 Feb 2024 04:00) (12 - 20)  SpO2: 100% (17 Feb 2024 04:00) (96% - 100%)    O2 Parameters below as of 17 Feb 2024 04:00  Patient On (Oxygen Delivery Method): ventilator    O2 Concentration (%): 30      Mode: AC/ CMV (Assist Control/ Continuous Mandatory Ventilation), RR (machine): 12, TV (machine): 450, FiO2: 30, PEEP: 5, ITime: 0.74, MAP: 10, PIP: 29    02-16 @ 07:01  -  02-17 @ 07:00  --------------------------------------------------------  IN: 1480 mL / OUT: 435 mL / NET: 1045 mL      CAPILLARY BLOOD GLUCOSE      POCT Blood Glucose.: 160 mg/dL (17 Feb 2024 05:13)      PHYSICAL EXAM:  General: NAD   HEENT:(+)NGT in R-nare  Neck: (+) Trach tube noted, site c/d/i.  Cards: S1/S2, no murmurs   Pulm: coarse breath sounds. No resp distress.   Abdomen: Soft, NTND.  Extremities: 1-2+ b/l pitting LE edema extending to below knee region.   Neurology: awake, eyes open. PERRL. Nonverbal. Not following commands. Unresponsive.   Skin: warm to touch, color appropriate for ethnicity. Refer to RN assessment for further details.      Mode: AC/ CMV (Assist Control/ Continuous Mandatory Ventilation)  RR (machine): 12  TV (machine): 450  FiO2: 30  PEEP: 5  ITime: 0.74  MAP: 10  PIP: 29      HOSPITAL MEDICATIONS:  MEDICATIONS  (STANDING):  albuterol/ipratropium for Nebulization 3 milliLiter(s) Nebulizer every 6 hours  artificial  tears Solution 1 Drop(s) Left EYE four times a day  aspirin  chewable 81 milliGRAM(s) Enteral Tube daily  chlorhexidine 0.12% Liquid 15 milliLiter(s) Oral Mucosa every 12 hours  dextrose 5%. 1000 milliLiter(s) (100 mL/Hr) IV Continuous <Continuous>  dextrose 5%. 1000 milliLiter(s) (50 mL/Hr) IV Continuous <Continuous>  dextrose 50% Injectable 25 Gram(s) IV Push once  dextrose 50% Injectable 12.5 Gram(s) IV Push once  dextrose 50% Injectable 25 Gram(s) IV Push once  doxazosin 2 milliGRAM(s) Oral at bedtime  escitalopram 10 milliGRAM(s) Oral daily  glucagon  Injectable 1 milliGRAM(s) IntraMuscular once  heparin   Injectable 5000 Unit(s) SubCutaneous every 8 hours  insulin glargine Injectable (LANTUS) 12 Unit(s) SubCutaneous <User Schedule>  insulin lispro (ADMELOG) corrective regimen sliding scale   SubCutaneous every 6 hours  levETIRAcetam  IVPB 500 milliGRAM(s) IV Intermittent every 12 hours  midodrine 30 milliGRAM(s) Oral every 8 hours  mupirocin 2% Ointment 1 Application(s) Topical two times a day  pantoprazole  Injectable 40 milliGRAM(s) IV Push every 12 hours  petrolatum Ophthalmic Ointment 1 Application(s) Left EYE at bedtime  piperacillin/tazobactam IVPB.. 3.375 Gram(s) IV Intermittent every 8 hours  sodium chloride 3%  Inhalation 4 milliLiter(s) Inhalation two times a day  sucralfate suspension 1 Gram(s) Enteral Tube two times a day    MEDICATIONS  (PRN):  dextrose Oral Gel 15 Gram(s) Oral once PRN Blood Glucose LESS THAN 70 milliGRAM(s)/deciliter      LABS:                        8.6    9.60  )-----------( 303      ( 17 Feb 2024 07:02 )             27.7     02-17    141  |  102  |  23  ----------------------------<  151<H>  4.0   |  26  |  1.64<H>    Ca    8.2<L>      17 Feb 2024 07:02  Phos  2.7     02-17  Mg     2.10     02-17    TPro  6.3  /  Alb  2.2<L>  /  TBili  0.3  /  DBili  x   /  AST  28  /  ALT  21  /  AlkPhos  86  02-16    PT/INR - ( 16 Feb 2024 00:30 )   PT: 12.4 sec;   INR: 1.10 ratio         PTT - ( 16 Feb 2024 00:30 )  PTT:30.5 sec  Urinalysis Basic - ( 17 Feb 2024 07:02 )    Color: x / Appearance: x / SG: x / pH: x  Gluc: 151 mg/dL / Ketone: x  / Bili: x / Urobili: x   Blood: x / Protein: x / Nitrite: x   Leuk Esterase: x / RBC: x / WBC x   Sq Epi: x / Non Sq Epi: x / Bacteria: x      Arterial Blood Gas:  02-16 @ 00:30  7.43/44/120/29/99.0/4.4  ABG lactate: --     CHIEF COMPLAINT:Patient is a 89y old  Male who presents with a chief complaint of COVID19, Chest pain (16 Feb 2024 14:39)      INTERVAL EVENTS: no overnight events noted. Transferred from MICU yesterday. Tmax 100.5F. Some diastolic hypotension however MAPs acceptable. On empiric course of Zosyn. Pending eval for PEG next week. Noted with some bloody secretions this morning/afternoon. Will trial TXA nebs.     ROS: Unable to obtain ROS given patient is unresponsive.     OBJECTIVE:  ICU Vital Signs Last 24 Hrs  T(C): 37 (17 Feb 2024 04:00), Max: 38.1 (16 Feb 2024 16:40)  T(F): 98.6 (17 Feb 2024 04:00), Max: 100.5 (16 Feb 2024 16:40)  HR: 88 (17 Feb 2024 04:00) (88 - 127)  BP: 116/55 (17 Feb 2024 04:00) (108/59 - 128/52)  BP(mean): 69 (16 Feb 2024 19:00) (59 - 72)  ABP: --  ABP(mean): --  RR: 12 (17 Feb 2024 04:00) (12 - 20)  SpO2: 100% (17 Feb 2024 04:00) (96% - 100%)    O2 Parameters below as of 17 Feb 2024 04:00  Patient On (Oxygen Delivery Method): ventilator    O2 Concentration (%): 30      Mode: AC/ CMV (Assist Control/ Continuous Mandatory Ventilation), RR (machine): 12, TV (machine): 450, FiO2: 30, PEEP: 5, ITime: 0.74, MAP: 10, PIP: 29    02-16 @ 07:01  -  02-17 @ 07:00  --------------------------------------------------------  IN: 1480 mL / OUT: 435 mL / NET: 1045 mL      CAPILLARY BLOOD GLUCOSE      POCT Blood Glucose.: 160 mg/dL (17 Feb 2024 05:13)      PHYSICAL EXAM:  General: NAD   HEENT:(+)NGT in R-nare  Neck: (+) Trach tube noted, site c/d/i.  Cards: S1/S2, no murmurs   Pulm: coarse breath sounds. No resp distress.   Abdomen: Soft, NTND.  Extremities: 1-2+ b/l pitting LE edema extending to below knee region.   Neurology: awake, eyes open. PERRL. Nonverbal. Not following commands. Unresponsive.   Skin: warm to touch, color appropriate for ethnicity. Refer to RN assessment for further details.      Mode: AC/ CMV (Assist Control/ Continuous Mandatory Ventilation)  RR (machine): 12  TV (machine): 450  FiO2: 30  PEEP: 5  ITime: 0.74  MAP: 10  PIP: 29      HOSPITAL MEDICATIONS:  MEDICATIONS  (STANDING):  albuterol/ipratropium for Nebulization 3 milliLiter(s) Nebulizer every 6 hours  artificial  tears Solution 1 Drop(s) Left EYE four times a day  aspirin  chewable 81 milliGRAM(s) Enteral Tube daily  chlorhexidine 0.12% Liquid 15 milliLiter(s) Oral Mucosa every 12 hours  dextrose 5%. 1000 milliLiter(s) (100 mL/Hr) IV Continuous <Continuous>  dextrose 5%. 1000 milliLiter(s) (50 mL/Hr) IV Continuous <Continuous>  dextrose 50% Injectable 25 Gram(s) IV Push once  dextrose 50% Injectable 12.5 Gram(s) IV Push once  dextrose 50% Injectable 25 Gram(s) IV Push once  doxazosin 2 milliGRAM(s) Oral at bedtime  escitalopram 10 milliGRAM(s) Oral daily  glucagon  Injectable 1 milliGRAM(s) IntraMuscular once  heparin   Injectable 5000 Unit(s) SubCutaneous every 8 hours  insulin glargine Injectable (LANTUS) 12 Unit(s) SubCutaneous <User Schedule>  insulin lispro (ADMELOG) corrective regimen sliding scale   SubCutaneous every 6 hours  levETIRAcetam  IVPB 500 milliGRAM(s) IV Intermittent every 12 hours  midodrine 30 milliGRAM(s) Oral every 8 hours  mupirocin 2% Ointment 1 Application(s) Topical two times a day  pantoprazole  Injectable 40 milliGRAM(s) IV Push every 12 hours  petrolatum Ophthalmic Ointment 1 Application(s) Left EYE at bedtime  piperacillin/tazobactam IVPB.. 3.375 Gram(s) IV Intermittent every 8 hours  sodium chloride 3%  Inhalation 4 milliLiter(s) Inhalation two times a day  sucralfate suspension 1 Gram(s) Enteral Tube two times a day    MEDICATIONS  (PRN):  dextrose Oral Gel 15 Gram(s) Oral once PRN Blood Glucose LESS THAN 70 milliGRAM(s)/deciliter      LABS:                        8.6    9.60  )-----------( 303      ( 17 Feb 2024 07:02 )             27.7     02-17    141  |  102  |  23  ----------------------------<  151<H>  4.0   |  26  |  1.64<H>    Ca    8.2<L>      17 Feb 2024 07:02  Phos  2.7     02-17  Mg     2.10     02-17    TPro  6.3  /  Alb  2.2<L>  /  TBili  0.3  /  DBili  x   /  AST  28  /  ALT  21  /  AlkPhos  86  02-16    PT/INR - ( 16 Feb 2024 00:30 )   PT: 12.4 sec;   INR: 1.10 ratio         PTT - ( 16 Feb 2024 00:30 )  PTT:30.5 sec  Urinalysis Basic - ( 17 Feb 2024 07:02 )    Color: x / Appearance: x / SG: x / pH: x  Gluc: 151 mg/dL / Ketone: x  / Bili: x / Urobili: x   Blood: x / Protein: x / Nitrite: x   Leuk Esterase: x / RBC: x / WBC x   Sq Epi: x / Non Sq Epi: x / Bacteria: x      Arterial Blood Gas:  02-16 @ 00:30  7.43/44/120/29/99.0/4.4  ABG lactate: --

## 2024-02-17 NOTE — PROGRESS NOTE ADULT - NS ATTEND AMEND GEN_ALL_CORE FT
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   He is not awake or alert, vent dependent, BP stable on midodrine, low grade febrile- Tmax 100.5.  Remains on Zosyn,  Had hemoptysis this afternoon with suctioning.  No evidence of coagulopathy and Hb is stable.  Given nebulized tranexemic acid to stop bleeding.  Would avoid deep suctioning.  Overal prognosis is poor given age and comorbidities.  Agree with plan as outlined above.

## 2024-02-17 NOTE — CHART NOTE - NSCHARTNOTEFT_GEN_A_CORE
LATE ENTRY:    Called by RN for hemoptysis. Patient assessed at bedside. Noted with bloody secretions able to be suctioned via in line suction. Canister with ~100ml bloody liquid however per report may be combined with saline from saline irrigation by RT earlier. BP stable. TXA nebs ordered. CXR also ordered to assess for plugging.     Per RN patient also with delayed hemostasis at site of insulin and heparin injection from earlier in the day. SIte assessed, no active bleeding however scant blood able to be expressed from puncture sites on LUE and abdomen. No oozing from IV sites. PLTs WNL however will send w/u for DIC although suspicion low.     Curbsided MICU, have evaluated patient given hemoptysis. Will repeat CBC to ensure to drop in Hgb. Recommend avoiding aggressive suctioning and TXA nebs and will monitor.

## 2024-02-17 NOTE — PROGRESS NOTE ADULT - ASSESSMENT
IMPRESSION:  89M w/ DM2, HTN, CVA, PAD, CKD3, and CAD-CABG, 12/23/23 p/w COVID19 infection, c/b respiratory failure/hypotension; now s/p tracheostomy    (1)Renal - CKD3; superimposed mild prerenal azotemia - numbers fluctuating based on hemodynamic status  (2)Lytes - acceptable   (3)CV - now off pressors & Cardura  (4)Pulm - vent-dependent   (5ID - still febrile; on IV Zosyn  (6)GI - needing to be afebrile for 24h prior to PEG    RECOMMEND:   (1)Plan for PEG next week  (2)Continue abx for GFR 30-40ml/min    Harpal Nathan NP  [facility]  [phone]

## 2024-02-17 NOTE — PROGRESS NOTE ADULT - SUBJECTIVE AND OBJECTIVE BOX
NEPHROLOGY    Patient seen and examined family at bedside. Trach to vent  noted with mild secretions from trach site, no fevers today, no distress.    MEDICATIONS  (STANDING):  albuterol/ipratropium for Nebulization 3 milliLiter(s) Nebulizer every 6 hours  artificial  tears Solution 1 Drop(s) Left EYE four times a day  aspirin  chewable 81 milliGRAM(s) Enteral Tube daily  chlorhexidine 0.12% Liquid 15 milliLiter(s) Oral Mucosa every 12 hours  chlorhexidine 2% Cloths 1 Application(s) Topical daily  dextrose 5%. 1000 milliLiter(s) (100 mL/Hr) IV Continuous <Continuous>  dextrose 5%. 1000 milliLiter(s) (50 mL/Hr) IV Continuous <Continuous>  dextrose 50% Injectable 25 Gram(s) IV Push once  dextrose 50% Injectable 25 Gram(s) IV Push once  dextrose 50% Injectable 12.5 Gram(s) IV Push once  doxazosin 2 milliGRAM(s) Oral at bedtime  escitalopram 10 milliGRAM(s) Oral daily  glucagon  Injectable 1 milliGRAM(s) IntraMuscular once  heparin   Injectable 5000 Unit(s) SubCutaneous every 8 hours  insulin glargine Injectable (LANTUS) 12 Unit(s) SubCutaneous <User Schedule>  insulin lispro (ADMELOG) corrective regimen sliding scale   SubCutaneous every 6 hours  levETIRAcetam  IVPB 500 milliGRAM(s) IV Intermittent every 12 hours  midodrine 30 milliGRAM(s) Oral every 8 hours  mupirocin 2% Ointment 1 Application(s) Topical two times a day  pantoprazole  Injectable 40 milliGRAM(s) IV Push every 12 hours  petrolatum Ophthalmic Ointment 1 Application(s) Left EYE at bedtime  piperacillin/tazobactam IVPB.. 3.375 Gram(s) IV Intermittent every 8 hours  sodium chloride 3%  Inhalation 4 milliLiter(s) Inhalation two times a day  sucralfate suspension 1 Gram(s) Enteral Tube two times a day  tranexamic acid Injectable for Topical Use 10 milliLiter(s) Topical once    VITALS:  T(C): , Max: 37.8 (02-16-24 @ 08:00)  T(F): , Max: 100 (02-16-24 @ 08:00)  HR: 106 (02-16-24 @ 14:00)  BP: 111/49 (02-16-24 @ 14:00)  BP(mean): 65 (02-16-24 @ 14:00)  RR: 13 (02-16-24 @ 14:00)  SpO2: 97% (02-16-24 @ 14:00)    I and O's:    02-15 @ 07:01  -  02-16 @ 07:00  --------------------------------------------------------  IN: 2098.1 mL / OUT: 1780 mL / NET: 318.1 mL    02-16 @ 07:01  -  02-16 @ 14:39  --------------------------------------------------------  IN: 325 mL / OUT: 170 mL / NET: 155 mL    PHYSICAL EXAM:  Constitutional: NAD  HEENT: +trach, + NGT  Respiratory: coarse bs   Cardiovascular: s1s2  Gastrointestinal: BS+, soft, NT/ND  Extremities:+ peripheral edema  : + delong  Skin: No rashes    LABS:                        8.1    7.85  )-----------( 318      ( 16 Feb 2024 00:30 )             24.8     02-16    139  |  99  |  23  ----------------------------<  184<H>  3.7   |  26  |  1.67<H>    Ca    7.9<L>      16 Feb 2024 00:30  Phos  3.6     02-16  Mg     2.10     02-16    TPro  6.3  /  Alb  2.2<L>  /  TBili  0.3  /  DBili  x   /  AST  28  /  ALT  21  /  AlkPhos  86  02-16

## 2024-02-18 LAB
ANION GAP SERPL CALC-SCNC: 11 MMOL/L — SIGNIFICANT CHANGE UP (ref 7–14)
BASOPHILS # BLD AUTO: 0.05 K/UL — SIGNIFICANT CHANGE UP (ref 0–0.2)
BASOPHILS NFR BLD AUTO: 0.7 % — SIGNIFICANT CHANGE UP (ref 0–2)
BUN SERPL-MCNC: 25 MG/DL — HIGH (ref 7–23)
CALCIUM SERPL-MCNC: 8.4 MG/DL — SIGNIFICANT CHANGE UP (ref 8.4–10.5)
CHLORIDE SERPL-SCNC: 103 MMOL/L — SIGNIFICANT CHANGE UP (ref 98–107)
CO2 SERPL-SCNC: 29 MMOL/L — SIGNIFICANT CHANGE UP (ref 22–31)
CREAT SERPL-MCNC: 1.63 MG/DL — HIGH (ref 0.5–1.3)
EGFR: 40 ML/MIN/1.73M2 — LOW
EOSINOPHIL # BLD AUTO: 1.01 K/UL — HIGH (ref 0–0.5)
EOSINOPHIL NFR BLD AUTO: 13.2 % — HIGH (ref 0–6)
GLUCOSE BLDC GLUCOMTR-MCNC: 190 MG/DL — HIGH (ref 70–99)
GLUCOSE BLDC GLUCOMTR-MCNC: 198 MG/DL — HIGH (ref 70–99)
GLUCOSE BLDC GLUCOMTR-MCNC: 207 MG/DL — HIGH (ref 70–99)
GLUCOSE BLDC GLUCOMTR-MCNC: 263 MG/DL — HIGH (ref 70–99)
GLUCOSE SERPL-MCNC: 182 MG/DL — HIGH (ref 70–99)
HCT VFR BLD CALC: 28.9 % — LOW (ref 39–50)
HGB BLD-MCNC: 9 G/DL — LOW (ref 13–17)
IANC: 4.3 K/UL — SIGNIFICANT CHANGE UP (ref 1.8–7.4)
IMM GRANULOCYTES NFR BLD AUTO: 1 % — HIGH (ref 0–0.9)
LYMPHOCYTES # BLD AUTO: 1.62 K/UL — SIGNIFICANT CHANGE UP (ref 1–3.3)
LYMPHOCYTES # BLD AUTO: 21.1 % — SIGNIFICANT CHANGE UP (ref 13–44)
MAGNESIUM SERPL-MCNC: 2.1 MG/DL — SIGNIFICANT CHANGE UP (ref 1.6–2.6)
MCHC RBC-ENTMCNC: 28.9 PG — SIGNIFICANT CHANGE UP (ref 27–34)
MCHC RBC-ENTMCNC: 31.1 GM/DL — LOW (ref 32–36)
MCV RBC AUTO: 92.9 FL — SIGNIFICANT CHANGE UP (ref 80–100)
MONOCYTES # BLD AUTO: 0.62 K/UL — SIGNIFICANT CHANGE UP (ref 0–0.9)
MONOCYTES NFR BLD AUTO: 8.1 % — SIGNIFICANT CHANGE UP (ref 2–14)
NEUTROPHILS # BLD AUTO: 4.3 K/UL — SIGNIFICANT CHANGE UP (ref 1.8–7.4)
NEUTROPHILS NFR BLD AUTO: 55.9 % — SIGNIFICANT CHANGE UP (ref 43–77)
NRBC # BLD: 0 /100 WBCS — SIGNIFICANT CHANGE UP (ref 0–0)
NRBC # FLD: 0 K/UL — SIGNIFICANT CHANGE UP (ref 0–0)
PHOSPHATE SERPL-MCNC: 2.7 MG/DL — SIGNIFICANT CHANGE UP (ref 2.5–4.5)
PLATELET # BLD AUTO: 316 K/UL — SIGNIFICANT CHANGE UP (ref 150–400)
POTASSIUM SERPL-MCNC: 3.5 MMOL/L — SIGNIFICANT CHANGE UP (ref 3.5–5.3)
POTASSIUM SERPL-SCNC: 3.5 MMOL/L — SIGNIFICANT CHANGE UP (ref 3.5–5.3)
RBC # BLD: 3.11 M/UL — LOW (ref 4.2–5.8)
RBC # FLD: 17.3 % — HIGH (ref 10.3–14.5)
SODIUM SERPL-SCNC: 143 MMOL/L — SIGNIFICANT CHANGE UP (ref 135–145)
WBC # BLD: 7.68 K/UL — SIGNIFICANT CHANGE UP (ref 3.8–10.5)
WBC # FLD AUTO: 7.68 K/UL — SIGNIFICANT CHANGE UP (ref 3.8–10.5)

## 2024-02-18 PROCEDURE — 99233 SBSQ HOSP IP/OBS HIGH 50: CPT

## 2024-02-18 PROCEDURE — 93010 ELECTROCARDIOGRAM REPORT: CPT

## 2024-02-18 PROCEDURE — 31622 DX BRONCHOSCOPE/WASH: CPT

## 2024-02-18 RX ORDER — MIDAZOLAM HYDROCHLORIDE 1 MG/ML
4 INJECTION, SOLUTION INTRAMUSCULAR; INTRAVENOUS ONCE
Refills: 0 | Status: DISCONTINUED | OUTPATIENT
Start: 2024-02-18 | End: 2024-02-18

## 2024-02-18 RX ORDER — FENTANYL CITRATE 50 UG/ML
50 INJECTION INTRAVENOUS ONCE
Refills: 0 | Status: DISCONTINUED | OUTPATIENT
Start: 2024-02-18 | End: 2024-02-18

## 2024-02-18 RX ADMIN — Medication 1 APPLICATION(S): at 23:06

## 2024-02-18 RX ADMIN — Medication 2: at 05:34

## 2024-02-18 RX ADMIN — PIPERACILLIN AND TAZOBACTAM 25 GRAM(S): 4; .5 INJECTION, POWDER, LYOPHILIZED, FOR SOLUTION INTRAVENOUS at 01:43

## 2024-02-18 RX ADMIN — Medication 3 MILLILITER(S): at 09:37

## 2024-02-18 RX ADMIN — FENTANYL CITRATE 50 MICROGRAM(S): 50 INJECTION INTRAVENOUS at 16:01

## 2024-02-18 RX ADMIN — MIDODRINE HYDROCHLORIDE 30 MILLIGRAM(S): 2.5 TABLET ORAL at 15:27

## 2024-02-18 RX ADMIN — MIDODRINE HYDROCHLORIDE 30 MILLIGRAM(S): 2.5 TABLET ORAL at 23:06

## 2024-02-18 RX ADMIN — Medication 1 DROP(S): at 17:04

## 2024-02-18 RX ADMIN — TRANEXAMIC ACID 500 MILLIGRAM(S): 100 INJECTION, SOLUTION INTRAVENOUS at 10:30

## 2024-02-18 RX ADMIN — SODIUM CHLORIDE 4 MILLILITER(S): 9 INJECTION INTRAMUSCULAR; INTRAVENOUS; SUBCUTANEOUS at 09:37

## 2024-02-18 RX ADMIN — PIPERACILLIN AND TAZOBACTAM 25 GRAM(S): 4; .5 INJECTION, POWDER, LYOPHILIZED, FOR SOLUTION INTRAVENOUS at 10:24

## 2024-02-18 RX ADMIN — ESCITALOPRAM OXALATE 10 MILLIGRAM(S): 10 TABLET, FILM COATED ORAL at 12:18

## 2024-02-18 RX ADMIN — Medication 3 MILLILITER(S): at 03:50

## 2024-02-18 RX ADMIN — PIPERACILLIN AND TAZOBACTAM 25 GRAM(S): 4; .5 INJECTION, POWDER, LYOPHILIZED, FOR SOLUTION INTRAVENOUS at 17:11

## 2024-02-18 RX ADMIN — PANTOPRAZOLE SODIUM 40 MILLIGRAM(S): 20 TABLET, DELAYED RELEASE ORAL at 17:04

## 2024-02-18 RX ADMIN — PANTOPRAZOLE SODIUM 40 MILLIGRAM(S): 20 TABLET, DELAYED RELEASE ORAL at 05:33

## 2024-02-18 RX ADMIN — MUPIROCIN 1 APPLICATION(S): 20 OINTMENT TOPICAL at 05:39

## 2024-02-18 RX ADMIN — CHLORHEXIDINE GLUCONATE 15 MILLILITER(S): 213 SOLUTION TOPICAL at 17:04

## 2024-02-18 RX ADMIN — Medication 2 MILLIGRAM(S): at 23:05

## 2024-02-18 RX ADMIN — MIDODRINE HYDROCHLORIDE 30 MILLIGRAM(S): 2.5 TABLET ORAL at 05:33

## 2024-02-18 RX ADMIN — Medication 1 DROP(S): at 05:33

## 2024-02-18 RX ADMIN — LEVETIRACETAM 400 MILLIGRAM(S): 250 TABLET, FILM COATED ORAL at 17:11

## 2024-02-18 RX ADMIN — TRANEXAMIC ACID 500 MILLIGRAM(S): 100 INJECTION, SOLUTION INTRAVENOUS at 03:46

## 2024-02-18 RX ADMIN — Medication 1 DROP(S): at 23:25

## 2024-02-18 RX ADMIN — Medication 3 MILLILITER(S): at 21:42

## 2024-02-18 RX ADMIN — Medication 1 GRAM(S): at 05:33

## 2024-02-18 RX ADMIN — SODIUM CHLORIDE 4 MILLILITER(S): 9 INJECTION INTRAMUSCULAR; INTRAVENOUS; SUBCUTANEOUS at 21:42

## 2024-02-18 RX ADMIN — Medication 2: at 17:12

## 2024-02-18 RX ADMIN — MIDAZOLAM HYDROCHLORIDE 4 MILLIGRAM(S): 1 INJECTION, SOLUTION INTRAMUSCULAR; INTRAVENOUS at 16:01

## 2024-02-18 RX ADMIN — Medication 4: at 12:18

## 2024-02-18 RX ADMIN — Medication 1 DROP(S): at 12:19

## 2024-02-18 RX ADMIN — Medication 3 MILLILITER(S): at 15:36

## 2024-02-18 RX ADMIN — CHLORHEXIDINE GLUCONATE 15 MILLILITER(S): 213 SOLUTION TOPICAL at 05:33

## 2024-02-18 RX ADMIN — Medication 6: at 23:05

## 2024-02-18 RX ADMIN — LEVETIRACETAM 400 MILLIGRAM(S): 250 TABLET, FILM COATED ORAL at 05:33

## 2024-02-18 RX ADMIN — Medication 1 GRAM(S): at 17:04

## 2024-02-18 RX ADMIN — INSULIN GLARGINE 12 UNIT(S): 100 INJECTION, SOLUTION SUBCUTANEOUS at 12:18

## 2024-02-18 NOTE — PROCEDURE NOTE - NSICDXIRPOSTOP_GEN_A_CORE_FT
POST-OP DIAGNOSIS:  Chronic hypoxemic respiratory failure 18-Feb-2024 18:21:46  Oziel Faulkner  Hemoptysis 18-Feb-2024 18:21:55  Oziel Faulkner

## 2024-02-18 NOTE — PROGRESS NOTE ADULT - NS ATTEND AMEND GEN_ALL_CORE FT
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   He is not awake or alert, vent dependent, BP stable on midodrine, low grade febrile- Tmax 100.5.  Remains on Zosyn,  Had hemoptysis yesterday with suctioning.  No evidence of coagulopathy and Hb is stable.  Given nebulized tranexemic acid to stop bleeding.  Would avoid deep suctioning.  No further hemoptysis noted.  Last night had heart rates to 48 overnight on the monitor.  EKG this morning shows HR 91, no pacer activity.  Will obtain EKG if HR drops again.  Agree with plan as outlined above.

## 2024-02-18 NOTE — PROCEDURE NOTE - NSBRONCHPROCDETAILS_GEN_A_CORE_FT
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     Preop medications: versed, fentanyl     Findings:  Bronchoscope inserted through tracheostomy. Tracheostomy noted to be in good position. Airway evaluation revealed erythematous, friable airway mucosa bilaterally.  No obvious endobronchial lesiosn identified.  No active source of bleeding identified.  Bloody secretions present in bilateral lower lobes, which were suctioned.  Therapeutic aspiration of secretions performed prior to withdrawal of bronchoscope from tracheostomy.  Patient tolerated procedure well without complications.     Specimens: none

## 2024-02-18 NOTE — PROCEDURE NOTE - NSINFORMCONSENT_GEN_A_CORE
Benefits, risks, and possible complications of procedure explained to patient/caregiver who verbalized understanding and gave written consent.
Emailed school note-see media to email as requested.  
Benefits, risks, and possible complications of procedure explained to patient/caregiver who verbalized understanding and gave verbal consent.
Benefits, risks, and possible complications of procedure explained to patient/caregiver who verbalized understanding and gave written consent.
This was an emergent procedure.
Benefits, risks, and possible complications of procedure explained to patient/caregiver who verbalized understanding and gave written consent.
This was an emergent procedure.

## 2024-02-18 NOTE — PROGRESS NOTE ADULT - ASSESSMENT
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       NEUROLOGY  #CVA  - prior CVA x3 with no residual deficits  - C/w Aspirin 81 mg QD    #Myoclonic jerks  - on Keppra 500 mg BID, per neuro wishing to wean however per family request will continue at 500 mg BID, but now off valproate per neuro  - holding home gabapentin and memantine in setting of somnolence  - continue lexapro     #AMS  - CT/CTA negative for stroke. EEG now off   - 2/8 MRI brain - unremarkable findings. Family now amenable to Trach/PEG.     CARDIOVASCULAR  #HTN  - home amlodipine and metoprolol on hold  - Midodrine 30 q8h, wean as tolerated. Off pressors 2/16     #CAD  - on aspirin, holding Brilinta and ranolazine  - Need to restart brilinta ASAP after Trach/PEG, last stent 2022    #Afib  - pt with known history of pAF, first observed 2/1  - can consider starting AC and/or rate control if persists     RESPIRATORY  #COVID  - s/p paxlovid and 1 dose remdesivir while inpatient    #Pleural effusions b/l  - CT chest from 1/16 showing new small bilateral pleural effusions/ atelectasis/ patchy bilateral ground-glass opacities are consistent with pulmonary edema.  - Diuresis PRN based on amount of pleural effusions on POCUS    #AHRF  - Intubated for airway protection in s/o AMS, now trach on 2/13 -  - Holding brilinta for possible procedure. Will need to restart ASAP after trach/PEG  - s/p zosyn for aspiration PNA from 1/20- 1/28   - MRSA Swab positive for Staph Aureus only, started on Mupirocin (2/13 - )  - Restarted on Zosyn (2/12 - ). BAL culture negative  - Hypertonic saline 4 mL Q6H, dueonebs 3 mL q6H    GI  #UGIB  - s/p EGD 1/2/24 showing dieulafoy lesion and esophagitis likely 2/2 NGT irritation s/p 2 clips  - On Protonix IV BID     #Acute Cholecystitis   - 12/26 Distended gallbladder with cholelithiasis and sludge  - 12/29 HIDA scan positive for Acute Lynsey   - S/p Zosyn (12/24 - 12/31)  - 1/26 s/p percutaneous cholecystostomy tube placement by IR     #Nutrition   - tube feeds NGT   - Discussed w/ family, want PEG.   - Trach 2/13, per GI PEG only after afebrile x 48 hours. Next eval would be Tuesday, 2/20.     RENAL  #CKD3  - CTM renal function   - on Cardura   - Passed TOV 2/16    INFECTIOUS DISEASE  #COVID   - s/p paxlovid and 1 dose remdesivir    # PNA  - 1/16 CT chest showing ground glass opacities  - s/p zosyn   - intermittent episodes of fevers, likely source GI given choleycystitis? culture NGTD  - meropenem 5d course thru (1/28 - 2/1)  - newly hypothermic on 2/11, on Zosyn (2/12 - ), unclear source, work-up unrevealing, plan for 7-day course.  - RVP negative 2/11, Blood cultures 2/11 negative, UA 2/11 negative  - 2/13 Bronch studies NGTD     HEME/ VASCULAR  #Anemia  - CTM CBC daily     #Mesenteric ischemia  - CTA demonstrating mesenteric fat stranding associated with ascending/transverse colon.   - 12/24 s/p SMA angiography with stent placement with diagnostic laparoscopy, small bowel and visible colon viable, some inflammation of omentum in RUQ.  - On ASA, Brillinta currently on hold pending PEG eval. Need to resume after procedure.     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       NEUROLOGY  #CVA  - prior CVA x3 with no residual deficits  - c/w Aspirin 81 mg QD    #Myoclonic jerks  - on Keppra 500 mg BID, per neuro wishing to wean however per family request will continue at 500 mg BID, but now off valproate per neuro  - holding home gabapentin and memantine in setting of somnolence  - Continue Lexapro     #AMS  - CT/CTA negative for stroke. EEG now off   - 2/8 MRI brain - unremarkable findings. Family now amenable to Trach/PEG.     CARDIOVASCULAR  #HTN  - home amlodipine and metoprolol on hold  - Midodrine 30 q8h, wean as tolerated. Off pressors 2/16   - HR noted in 48s, but no heart rate pacing, EKG noted w RBBB and HR 91  - Will continue to monitor closely    #CAD  - on aspirin, holding Brilinta and ranolazine  - Need to restart brilinta ASAP after Trach/PEG, last stent 2022    #Afib  - pt with known history of pAF, first observed 2/1  - can consider starting AC and/or rate control if persists     RESPIRATORY  #COVID  - s/p paxlovid and 1 dose Remdesivir while inpatient    #Pleural effusions b/l  - CT chest from 1/16 showing new small bilateral pleural effusions/ atelectasis/ patchy bilateral ground-glass opacities are consistent with pulmonary edema.  - Diuresis PRN based on amount of pleural effusions on POCUS    #AHRF  - Intubated for airway protection in s/o AMS, now trach on 2/13 -  - Holding brilinta for possible procedure. Will need to restart ASAP after trach/PEG  - s/p zosyn for aspiration PNA from 1/20- 1/28   - MRSA Swab positive for Staph Aureus only, started on Mupirocin (2/13 - )  - Restarted on Zosyn (2/12 - ). BAL culture negative  - Hypertonic saline 4 mL Q6H, Duoneb 3 mL q6H    GI  #UGIB  - s/p EGD 1/2/24 showing dieulafoy lesion and esophagitis likely 2/2 NGT irritation s/p 2 clips  - On Protonix IV BID     #Acute Cholecystitis   - 12/26 Distended gallbladder with cholelithiasis and sludge  - 12/29 HIDA scan positive for Acute Lynsey   - S/p Zosyn (12/24 - 12/31)  - 1/26 s/p percutaneous cholecystostomy tube placement by IR     #Nutrition   - tube feeds NGT   - Discussed w/ family, want PEG.   - Trach 2/13, per GI PEG only after afebrile x 48 hours. Next eval would be Tuesday, 2/20.     RENAL  #CKD3  - CTM renal function   - on Cardura   - Passed TOV 2/16    INFECTIOUS DISEASE  #COVID   - s/p paxlovid and 1 dose remdesivir    # PNA  - 1/16 CT chest showing ground glass opacities  - s/p zosyn   - intermittent episodes of fevers, likely source GI given choleycystitis? culture NGTD  - meropenem 5d course thru (1/28 - 2/1)  - newly hypothermic on 2/11, on Zosyn (2/12 - ), unclear source, work-up unrevealing, plan for 7-day course.  - RVP negative 2/11, Blood cultures 2/11 negative, UA 2/11 negative  - 2/13 Bronch studies NGTD     HEME/ VASCULAR  #Anemia  - CTM CBC daily     #Mesenteric ischemia  - CTA demonstrating mesenteric fat stranding associated with ascending/transverse colon.   - 12/24 s/p SMA angiography with stent placement with diagnostic laparoscopy, small bowel and visible colon viable, some inflammation of omentum in RUQ.  - On ASA, Brillinta currently on hold pending PEG eval. Need to resume after procedure.     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       NEUROLOGY  #CVA  - prior CVA x3 with no residual deficits  - c/w Aspirin 81 mg QD    #Myoclonic jerks  - on Keppra 500 mg BID, per neuro wishing to wean however per family request will continue at 500 mg BID, but now off valproate per neuro  - holding home gabapentin and memantine in setting of somnolence  - Continue Lexapro     #AMS  - CT/CTA negative for stroke. EEG now off   - 2/8 MRI brain - unremarkable findings. Family now amenable to Trach/PEG.     CARDIOVASCULAR  #HTN  - home amlodipine and metoprolol on hold  - Midodrine 30 q8h, wean as tolerated. Off pressors 2/16   - HR noted in 48s, but no heart rate pacing, EKG noted w RBBB and HR 91  - Will continue to monitor closely    #CAD  - on aspirin, holding Brilinta and ranolazine  - Need to restart brilinta ASAP after Trach/PEG, last stent 2022    #Afib  - pt with known history of pAF, first observed 2/1  - can consider starting AC and/or rate control if persists     RESPIRATORY  #COVID  - s/p paxlovid and 1 dose Remdesivir while inpatient  - s/p Bronch on 2/18 - No active bleeding    #Pleural effusions b/l  - CT chest from 1/16 showing new small bilateral pleural effusions/ atelectasis/ patchy bilateral ground-glass opacities are consistent with pulmonary edema.  - Diuresis PRN based on amount of pleural effusions on POCUS    #AHRF  - Intubated for airway protection in s/o AMS, now trach on 2/13 -  - Holding brilinta for possible procedure. Will need to restart ASAP after trach/PEG  - s/p zosyn for aspiration PNA from 1/20- 1/28   - MRSA Swab positive for Staph Aureus only, started on Mupirocin (2/13 - )  - Restarted on Zosyn (2/12 - ). BAL culture negative  - Hypertonic saline 4 mL Q6H, Duoneb 3 mL q6H    GI  #UGIB  - s/p EGD 1/2/24 showing dieulafoy lesion and esophagitis likely 2/2 NGT irritation s/p 2 clips  - On Protonix IV BID     #Acute Cholecystitis   - 12/26 Distended gallbladder with cholelithiasis and sludge  - 12/29 HIDA scan positive for Acute Lynsey   - S/p Zosyn (12/24 - 12/31)  - 1/26 s/p percutaneous cholecystostomy tube placement by IR     #Nutrition   - tube feeds NGT   - Discussed w/ family, want PEG.   - Trach 2/13, per GI PEG only after afebrile x 48 hours. Next eval would be Tuesday, 2/20.     RENAL  #CKD3  - CTM renal function   - on Cardura   - Passed TOV 2/16    INFECTIOUS DISEASE  #COVID   - s/p paxlovid and 1 dose remdesivir    # PNA  - 1/16 CT chest showing ground glass opacities  - s/p zosyn   - intermittent episodes of fevers, likely source GI given choleycystitis? culture NGTD  - meropenem 5d course thru (1/28 - 2/1)  - newly hypothermic on 2/11, on Zosyn (2/12 - ), unclear source, work-up unrevealing, plan for 7-day course.  - RVP negative 2/11, Blood cultures 2/11 negative, UA 2/11 negative  - 2/13 Bronch studies NGTD     HEME/ VASCULAR  #Anemia  - CTM CBC daily     #Mesenteric ischemia  - CTA demonstrating mesenteric fat stranding associated with ascending/transverse colon.   - 12/24 s/p SMA angiography with stent placement with diagnostic laparoscopy, small bowel and visible colon viable, some inflammation of omentum in RUQ.  - On ASA, Brillinta currently on hold pending PEG eval. Need to resume after procedure.     #DVT PPX  -  Q8H    ENDOCRINE  #DM2  - A1c 9.3   - on lantus 12U and q6 SSI    LINES/ TUBES  - NGT  - cholecystostomy tube 1/26     ETHICS/ GOC    - Trach/ eventual PEG    DISPO: DHARMESH

## 2024-02-18 NOTE — PROGRESS NOTE ADULT - SUBJECTIVE AND OBJECTIVE BOX
CHIEF COMPLAINT: Patient is a 89y old  Male who presents with a chief complaint of COVID19, Chest pain (17 Feb 2024 16:46)      Interval Events:    REVIEW OF SYSTEMS:  [ ] All other systems negative  [ ] Unable to assess ROS because ________    Mode: AC/ CMV (Assist Control/ Continuous Mandatory Ventilation), RR (machine): 12, TV (machine): 450, FiO2: 30, PEEP: 5, ITime: 0.73, MAP: 12, PIP: 35      OBJECTIVE:  ICU Vital Signs Last 24 Hrs  T(C): 36.7 (18 Feb 2024 08:49), Max: 37.1 (17 Feb 2024 20:00)  T(F): 98 (18 Feb 2024 08:49), Max: 98.8 (17 Feb 2024 20:00)  HR: 48 (18 Feb 2024 08:49) (47 - 95)  BP: 118/49 (18 Feb 2024 08:49) (118/49 - 138/70)  BP(mean): 70 (18 Feb 2024 08:49) (68 - 91)  ABP: --  ABP(mean): --  RR: 19 (18 Feb 2024 08:49) (12 - 19)  SpO2: 99% (18 Feb 2024 08:49) (98% - 100%)    O2 Parameters below as of 18 Feb 2024 08:49  Patient On (Oxygen Delivery Method): ventilator          Mode: AC/ CMV (Assist Control/ Continuous Mandatory Ventilation), RR (machine): 12, TV (machine): 450, FiO2: 30, PEEP: 5, ITime: 0.73, MAP: 12, PIP: 35    02-17 @ 07:01  -  02-18 @ 07:00  --------------------------------------------------------  IN: 1235 mL / OUT: 715 mL / NET: 520 mL      CAPILLARY BLOOD GLUCOSE      POCT Blood Glucose.: 190 mg/dL (18 Feb 2024 04:54)      HOSPITAL MEDICATIONS:  MEDICATIONS  (STANDING):  albuterol/ipratropium for Nebulization 3 milliLiter(s) Nebulizer every 6 hours  artificial  tears Solution 1 Drop(s) Left EYE four times a day  chlorhexidine 0.12% Liquid 15 milliLiter(s) Oral Mucosa every 12 hours  dextrose 5%. 1000 milliLiter(s) (50 mL/Hr) IV Continuous <Continuous>  dextrose 5%. 1000 milliLiter(s) (100 mL/Hr) IV Continuous <Continuous>  dextrose 50% Injectable 25 Gram(s) IV Push once  dextrose 50% Injectable 25 Gram(s) IV Push once  dextrose 50% Injectable 12.5 Gram(s) IV Push once  doxazosin 2 milliGRAM(s) Oral at bedtime  escitalopram 10 milliGRAM(s) Oral daily  glucagon  Injectable 1 milliGRAM(s) IntraMuscular once  insulin glargine Injectable (LANTUS) 12 Unit(s) SubCutaneous <User Schedule>  insulin lispro (ADMELOG) corrective regimen sliding scale   SubCutaneous every 6 hours  levETIRAcetam  IVPB 500 milliGRAM(s) IV Intermittent every 12 hours  midodrine 30 milliGRAM(s) Oral every 8 hours  pantoprazole  Injectable 40 milliGRAM(s) IV Push every 12 hours  petrolatum Ophthalmic Ointment 1 Application(s) Left EYE at bedtime  piperacillin/tazobactam IVPB.. 3.375 Gram(s) IV Intermittent every 8 hours  sodium chloride 3%  Inhalation 4 milliLiter(s) Inhalation two times a day  sucralfate suspension 1 Gram(s) Enteral Tube two times a day    MEDICATIONS  (PRN):  dextrose Oral Gel 15 Gram(s) Oral once PRN Blood Glucose LESS THAN 70 milliGRAM(s)/deciliter  tranexamic acid Injectable for Nebulization 500 milliGRAM(s) Inhalation every 8 hours PRN hemoptysis      LABS:                        9.0    7.68  )-----------( 316      ( 18 Feb 2024 05:00 )             28.9     02-18    143  |  103  |  25<H>  ----------------------------<  182<H>  3.5   |  29  |  1.63<H>    Ca    8.4      18 Feb 2024 05:00  Phos  2.7     02-18  Mg     2.10     02-18      PT/INR - ( 17 Feb 2024 17:59 )   PT: 11.6 sec;   INR: 1.03 ratio         PTT - ( 17 Feb 2024 17:59 )  PTT:31.9 sec  Urinalysis Basic - ( 18 Feb 2024 05:00 )    Color: x / Appearance: x / SG: x / pH: x  Gluc: 182 mg/dL / Ketone: x  / Bili: x / Urobili: x   Blood: x / Protein: x / Nitrite: x   Leuk Esterase: x / RBC: x / WBC x   Sq Epi: x / Non Sq Epi: x / Bacteria: x            PAST MEDICAL & SURGICAL HISTORY:  Hyperlipemia      Hypertension      Coronary Artery Disease      Diabetes Mellitus Type II      Stented Coronary Artery  total 5 stents, last stent 5/2019      Neuropathy      Myocardial infarction      Stroke  mild left facial numbness   no other residuals verbalized      Myoclonic jerking      Stage 3 chronic kidney disease      History of Cataract Extraction      Hx of CABG      H/O coronary angiogram      S/P coronary artery stent placement  1/6/09      S/P placement of cardiac pacemaker          FAMILY HISTORY:  No pertinent family history in first degree relatives        Social History:  Lives with family  Ambulates with walker  Denies tobacco, EtOH, or illicit substance use (23 Dec 2023 22:51)      RADIOLOGY:  [ ] Reviewed and interpreted by me    PULMONARY FUNCTION TESTS:    EKG: CHIEF COMPLAINT: Patient is a 89y old  Male who presents with a chief complaint of COVID19, Chest pain (17 Feb 2024 16:46)    Interval Events: No major events reported overnight. Clinically unchanged. No further bleeding noted from trach, will continue to monitor closely. VSS and medications.     REVIEW OF SYSTEMS:  See above  [x] All other systems negative    Mode: AC/ CMV (Assist Control/ Continuous Mandatory Ventilation), RR (machine): 12, TV (machine): 450, FiO2: 30, PEEP: 5, ITime: 0.73, MAP: 12, PIP: 35    OBJECTIVE:  ICU Vital Signs Last 24 Hrs  T(C): 36.7 (18 Feb 2024 08:49), Max: 37.1 (17 Feb 2024 20:00)  T(F): 98 (18 Feb 2024 08:49), Max: 98.8 (17 Feb 2024 20:00)  HR: 48 (18 Feb 2024 08:49) (47 - 95)  BP: 118/49 (18 Feb 2024 08:49) (118/49 - 138/70)  BP(mean): 70 (18 Feb 2024 08:49) (68 - 91)  ABP: --  ABP(mean): --  RR: 19 (18 Feb 2024 08:49) (12 - 19)  SpO2: 99% (18 Feb 2024 08:49) (98% - 100%)    O2 Parameters below as of 18 Feb 2024 08:49  Patient On (Oxygen Delivery Method): ventilator    Mode: AC/ CMV (Assist Control/ Continuous Mandatory Ventilation), RR (machine): 12, TV (machine): 450, FiO2: 30, PEEP: 5, ITime: 0.73, MAP: 12, PIP: 35    02-17 @ 07:01  -  02-18 @ 07:00  --------------------------------------------------------  IN: 1235 mL / OUT: 715 mL / NET: 520 mL    CAPILLARY BLOOD GLUCOSE    POCT Blood Glucose.: 190 mg/dL (18 Feb 2024 04:54)    HOSPITAL MEDICATIONS:  MEDICATIONS  (STANDING):  albuterol/ipratropium for Nebulization 3 milliLiter(s) Nebulizer every 6 hours  artificial  tears Solution 1 Drop(s) Left EYE four times a day  chlorhexidine 0.12% Liquid 15 milliLiter(s) Oral Mucosa every 12 hours  dextrose 5%. 1000 milliLiter(s) (50 mL/Hr) IV Continuous <Continuous>  dextrose 5%. 1000 milliLiter(s) (100 mL/Hr) IV Continuous <Continuous>  dextrose 50% Injectable 25 Gram(s) IV Push once  dextrose 50% Injectable 25 Gram(s) IV Push once  dextrose 50% Injectable 12.5 Gram(s) IV Push once  doxazosin 2 milliGRAM(s) Oral at bedtime  escitalopram 10 milliGRAM(s) Oral daily  glucagon  Injectable 1 milliGRAM(s) IntraMuscular once  insulin glargine Injectable (LANTUS) 12 Unit(s) SubCutaneous <User Schedule>  insulin lispro (ADMELOG) corrective regimen sliding scale   SubCutaneous every 6 hours  levETIRAcetam  IVPB 500 milliGRAM(s) IV Intermittent every 12 hours  midodrine 30 milliGRAM(s) Oral every 8 hours  pantoprazole  Injectable 40 milliGRAM(s) IV Push every 12 hours  petrolatum Ophthalmic Ointment 1 Application(s) Left EYE at bedtime  piperacillin/tazobactam IVPB.. 3.375 Gram(s) IV Intermittent every 8 hours  sodium chloride 3%  Inhalation 4 milliLiter(s) Inhalation two times a day  sucralfate suspension 1 Gram(s) Enteral Tube two times a day    MEDICATIONS  (PRN):  dextrose Oral Gel 15 Gram(s) Oral once PRN Blood Glucose LESS THAN 70 milliGRAM(s)/deciliter  tranexamic acid Injectable for Nebulization 500 milliGRAM(s) Inhalation every 8 hours PRN hemoptysis    PHYSICAL EXAM:  General: Laying in bed comfortably, NAD   HEENT:(+)NGT in R-nare  Neck: (+) Trach tube noted, site c/d/i.  HEART: +s1, s2  LUNGS: +coarse breath sounds. No resp distress.   GI: +BS, soft, nt/nd, no peritoneal signs noted   Extremities: 1-2+ b/l pitting LE edema extending to below knee region.   NEURO: awake, eyes open. PERRL. Nonverbal. Not following commands. Unresponsive.   Skin: as per RN assessment sheet     LABS:                        9.0    7.68  )-----------( 316      ( 18 Feb 2024 05:00 )             28.9     02-18    143  |  103  |  25<H>  ----------------------------<  182<H>  3.5   |  29  |  1.63<H>    Ca    8.4      18 Feb 2024 05:00  Phos  2.7     02-18  Mg     2.10     02-18    PT/INR - ( 17 Feb 2024 17:59 )   PT: 11.6 sec;   INR: 1.03 ratio       PTT - ( 17 Feb 2024 17:59 )  PTT:31.9 sec  Urinalysis Basic - ( 18 Feb 2024 05:00 )    Color: x / Appearance: x / SG: x / pH: x  Gluc: 182 mg/dL / Ketone: x  / Bili: x / Urobili: x   Blood: x / Protein: x / Nitrite: x   Leuk Esterase: x / RBC: x / WBC x   Sq Epi: x / Non Sq Epi: x / Bacteria: x    PAST MEDICAL & SURGICAL HISTORY:  Hyperlipemia    Hypertension    Coronary Artery Disease    Diabetes Mellitus Type II    Stented Coronary Artery  total 5 stents, last stent 5/2019    Neuropathy    Myocardial infarction    Stroke  mild left facial numbness   no other residuals verbalized    Myoclonic jerking    Stage 3 chronic kidney disease    History of Cataract Extraction    Hx of CABG    H/O coronary angiogram      S/P coronary artery stent placement  1/6/09    S/P placement of cardiac pacemaker      FAMILY HISTORY:  No pertinent family history in first degree relatives    Social History:  Lives with family  Ambulates with walker  Denies tobacco, EtOH, or illicit substance use (23 Dec 2023 22:51)    RADIOLOGY:  [ ] Reviewed and interpreted by me    PULMONARY FUNCTION TESTS:    EKG: CHIEF COMPLAINT: Patient is a 89y old  Male who presents with a chief complaint of COVID19, Chest pain (17 Feb 2024 16:46)    Interval Events: No major events reported overnight. Clinically unchanged. s/p bronch today, no active bleeding noted. Will continue to monitor closely. VSS and medications.     REVIEW OF SYSTEMS:  See above  [x] All other systems negative    Mode: AC/ CMV (Assist Control/ Continuous Mandatory Ventilation), RR (machine): 12, TV (machine): 450, FiO2: 30, PEEP: 5, ITime: 0.73, MAP: 12, PIP: 35    OBJECTIVE:  ICU Vital Signs Last 24 Hrs  T(C): 36.7 (18 Feb 2024 08:49), Max: 37.1 (17 Feb 2024 20:00)  T(F): 98 (18 Feb 2024 08:49), Max: 98.8 (17 Feb 2024 20:00)  HR: 48 (18 Feb 2024 08:49) (47 - 95)  BP: 118/49 (18 Feb 2024 08:49) (118/49 - 138/70)  BP(mean): 70 (18 Feb 2024 08:49) (68 - 91)  ABP: --  ABP(mean): --  RR: 19 (18 Feb 2024 08:49) (12 - 19)  SpO2: 99% (18 Feb 2024 08:49) (98% - 100%)    O2 Parameters below as of 18 Feb 2024 08:49  Patient On (Oxygen Delivery Method): ventilator    Mode: AC/ CMV (Assist Control/ Continuous Mandatory Ventilation), RR (machine): 12, TV (machine): 450, FiO2: 30, PEEP: 5, ITime: 0.73, MAP: 12, PIP: 35    02-17 @ 07:01  -  02-18 @ 07:00  --------------------------------------------------------  IN: 1235 mL / OUT: 715 mL / NET: 520 mL    CAPILLARY BLOOD GLUCOSE    POCT Blood Glucose.: 190 mg/dL (18 Feb 2024 04:54)    HOSPITAL MEDICATIONS:  MEDICATIONS  (STANDING):  albuterol/ipratropium for Nebulization 3 milliLiter(s) Nebulizer every 6 hours  artificial  tears Solution 1 Drop(s) Left EYE four times a day  chlorhexidine 0.12% Liquid 15 milliLiter(s) Oral Mucosa every 12 hours  dextrose 5%. 1000 milliLiter(s) (50 mL/Hr) IV Continuous <Continuous>  dextrose 5%. 1000 milliLiter(s) (100 mL/Hr) IV Continuous <Continuous>  dextrose 50% Injectable 25 Gram(s) IV Push once  dextrose 50% Injectable 25 Gram(s) IV Push once  dextrose 50% Injectable 12.5 Gram(s) IV Push once  doxazosin 2 milliGRAM(s) Oral at bedtime  escitalopram 10 milliGRAM(s) Oral daily  glucagon  Injectable 1 milliGRAM(s) IntraMuscular once  insulin glargine Injectable (LANTUS) 12 Unit(s) SubCutaneous <User Schedule>  insulin lispro (ADMELOG) corrective regimen sliding scale   SubCutaneous every 6 hours  levETIRAcetam  IVPB 500 milliGRAM(s) IV Intermittent every 12 hours  midodrine 30 milliGRAM(s) Oral every 8 hours  pantoprazole  Injectable 40 milliGRAM(s) IV Push every 12 hours  petrolatum Ophthalmic Ointment 1 Application(s) Left EYE at bedtime  piperacillin/tazobactam IVPB.. 3.375 Gram(s) IV Intermittent every 8 hours  sodium chloride 3%  Inhalation 4 milliLiter(s) Inhalation two times a day  sucralfate suspension 1 Gram(s) Enteral Tube two times a day    MEDICATIONS  (PRN):  dextrose Oral Gel 15 Gram(s) Oral once PRN Blood Glucose LESS THAN 70 milliGRAM(s)/deciliter  tranexamic acid Injectable for Nebulization 500 milliGRAM(s) Inhalation every 8 hours PRN hemoptysis    PHYSICAL EXAM:  General: Laying in bed comfortably, NAD   HEENT:(+)NGT in R-nare  Neck: (+) Trach tube noted, site c/d/i.  HEART: +s1, s2  LUNGS: +coarse breath sounds. No resp distress.   GI: +BS, soft, nt/nd, no peritoneal signs noted   Extremities: 1-2+ b/l pitting LE edema extending to below knee region.   NEURO: awake, eyes open. PERRL. Nonverbal. Not following commands. Unresponsive.   Skin: as per RN assessment sheet     LABS:                        9.0    7.68  )-----------( 316      ( 18 Feb 2024 05:00 )             28.9     02-18    143  |  103  |  25<H>  ----------------------------<  182<H>  3.5   |  29  |  1.63<H>    Ca    8.4      18 Feb 2024 05:00  Phos  2.7     02-18  Mg     2.10     02-18    PT/INR - ( 17 Feb 2024 17:59 )   PT: 11.6 sec;   INR: 1.03 ratio       PTT - ( 17 Feb 2024 17:59 )  PTT:31.9 sec  Urinalysis Basic - ( 18 Feb 2024 05:00 )    Color: x / Appearance: x / SG: x / pH: x  Gluc: 182 mg/dL / Ketone: x  / Bili: x / Urobili: x   Blood: x / Protein: x / Nitrite: x   Leuk Esterase: x / RBC: x / WBC x   Sq Epi: x / Non Sq Epi: x / Bacteria: x    PAST MEDICAL & SURGICAL HISTORY:  Hyperlipemia    Hypertension    Coronary Artery Disease    Diabetes Mellitus Type II    Stented Coronary Artery  total 5 stents, last stent 5/2019    Neuropathy    Myocardial infarction    Stroke  mild left facial numbness   no other residuals verbalized    Myoclonic jerking    Stage 3 chronic kidney disease    History of Cataract Extraction    Hx of CABG    H/O coronary angiogram      S/P coronary artery stent placement  1/6/09    S/P placement of cardiac pacemaker      FAMILY HISTORY:  No pertinent family history in first degree relatives    Social History:  Lives with family  Ambulates with walker  Denies tobacco, EtOH, or illicit substance use (23 Dec 2023 22:51)    RADIOLOGY:  [ ] Reviewed and interpreted by me    PULMONARY FUNCTION TESTS:    EKG:

## 2024-02-19 LAB
GLUCOSE BLDC GLUCOMTR-MCNC: 197 MG/DL — HIGH (ref 70–99)
GLUCOSE BLDC GLUCOMTR-MCNC: 221 MG/DL — HIGH (ref 70–99)
GLUCOSE BLDC GLUCOMTR-MCNC: 223 MG/DL — HIGH (ref 70–99)
GLUCOSE BLDC GLUCOMTR-MCNC: 235 MG/DL — HIGH (ref 70–99)

## 2024-02-19 PROCEDURE — 99233 SBSQ HOSP IP/OBS HIGH 50: CPT

## 2024-02-19 RX ORDER — MIDODRINE HYDROCHLORIDE 2.5 MG/1
15 TABLET ORAL EVERY 8 HOURS
Refills: 0 | Status: DISCONTINUED | OUTPATIENT
Start: 2024-02-19 | End: 2024-02-21

## 2024-02-19 RX ADMIN — Medication 1 DROP(S): at 17:25

## 2024-02-19 RX ADMIN — INSULIN GLARGINE 12 UNIT(S): 100 INJECTION, SOLUTION SUBCUTANEOUS at 12:52

## 2024-02-19 RX ADMIN — Medication 4: at 05:52

## 2024-02-19 RX ADMIN — LEVETIRACETAM 400 MILLIGRAM(S): 250 TABLET, FILM COATED ORAL at 05:49

## 2024-02-19 RX ADMIN — Medication 4: at 12:06

## 2024-02-19 RX ADMIN — Medication 3 MILLILITER(S): at 09:38

## 2024-02-19 RX ADMIN — Medication 3 MILLILITER(S): at 16:24

## 2024-02-19 RX ADMIN — LEVETIRACETAM 400 MILLIGRAM(S): 250 TABLET, FILM COATED ORAL at 17:24

## 2024-02-19 RX ADMIN — SODIUM CHLORIDE 4 MILLILITER(S): 9 INJECTION INTRAMUSCULAR; INTRAVENOUS; SUBCUTANEOUS at 21:59

## 2024-02-19 RX ADMIN — PANTOPRAZOLE SODIUM 40 MILLIGRAM(S): 20 TABLET, DELAYED RELEASE ORAL at 17:26

## 2024-02-19 RX ADMIN — Medication 3 MILLILITER(S): at 04:08

## 2024-02-19 RX ADMIN — SODIUM CHLORIDE 4 MILLILITER(S): 9 INJECTION INTRAMUSCULAR; INTRAVENOUS; SUBCUTANEOUS at 09:38

## 2024-02-19 RX ADMIN — Medication 1 DROP(S): at 05:51

## 2024-02-19 RX ADMIN — PIPERACILLIN AND TAZOBACTAM 25 GRAM(S): 4; .5 INJECTION, POWDER, LYOPHILIZED, FOR SOLUTION INTRAVENOUS at 01:39

## 2024-02-19 RX ADMIN — Medication 3 MILLILITER(S): at 21:59

## 2024-02-19 RX ADMIN — Medication 2 MILLIGRAM(S): at 21:31

## 2024-02-19 RX ADMIN — MIDODRINE HYDROCHLORIDE 15 MILLIGRAM(S): 2.5 TABLET ORAL at 21:31

## 2024-02-19 RX ADMIN — Medication 4: at 18:02

## 2024-02-19 RX ADMIN — Medication 1 DROP(S): at 11:41

## 2024-02-19 RX ADMIN — CHLORHEXIDINE GLUCONATE 15 MILLILITER(S): 213 SOLUTION TOPICAL at 17:24

## 2024-02-19 RX ADMIN — Medication 1 GRAM(S): at 17:25

## 2024-02-19 RX ADMIN — CHLORHEXIDINE GLUCONATE 15 MILLILITER(S): 213 SOLUTION TOPICAL at 05:51

## 2024-02-19 RX ADMIN — PANTOPRAZOLE SODIUM 40 MILLIGRAM(S): 20 TABLET, DELAYED RELEASE ORAL at 05:50

## 2024-02-19 RX ADMIN — MIDODRINE HYDROCHLORIDE 30 MILLIGRAM(S): 2.5 TABLET ORAL at 05:50

## 2024-02-19 RX ADMIN — Medication 1 GRAM(S): at 05:51

## 2024-02-19 RX ADMIN — ESCITALOPRAM OXALATE 10 MILLIGRAM(S): 10 TABLET, FILM COATED ORAL at 11:40

## 2024-02-19 RX ADMIN — Medication 1 APPLICATION(S): at 21:33

## 2024-02-19 NOTE — PROGRESS NOTE ADULT - NS ATTEND AMEND GEN_ALL_CORE FT
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   He is not awake or alert, vent dependent, BP stable on midodrine, low grade febrile- Tmax 100.5.  Remains on Zosyn,  Had hemoptysis yesterday with suctioning.  No evidence of coagulopathy and Hb is stable.  Given nebulized tranexemic acid to stop bleeding.  Would avoid deep suctioning.  No further hemoptysis noted.  Bronchoscopy did not show bleeding source, but diffuse mucosal erythema with blood tinged secretions. He has stable VS this AM, is afebrile.  Will try to decrease midodrine as tolerated.  AGree with plan as outlined above.

## 2024-02-19 NOTE — CHART NOTE - NSCHARTNOTEFT_GEN_A_CORE
GI team planning for PEG placement tomorrow. Please ensure NPO after midnight and early AM lab collection.    Rangel Ramos MD  Gastroenterology/Hepatology Fellow

## 2024-02-19 NOTE — PROGRESS NOTE ADULT - SUBJECTIVE AND OBJECTIVE BOX
CHIEF COMPLAINT: Patient is a 89y old  Male who presents with a chief complaint of COVID19, Chest pain (18 Feb 2024 09:51)      Interval Events:    REVIEW OF SYSTEMS:  [ ] All other systems negative  [ ] Unable to assess ROS because ________    Mode: AC/ CMV (Assist Control/ Continuous Mandatory Ventilation), RR (machine): 12, TV (machine): 0.45, FiO2: 30, PEEP: 5, ITime: 0.75, MAP: 11, PIP: 32      OBJECTIVE:  ICU Vital Signs Last 24 Hrs  T(C): 37.1 (19 Feb 2024 04:00), Max: 37.7 (18 Feb 2024 16:00)  T(F): 98.8 (19 Feb 2024 04:00), Max: 99.9 (18 Feb 2024 16:00)  HR: 96 (19 Feb 2024 08:04) (47 - 102)  BP: 139/73 (19 Feb 2024 08:00) (115/50 - 139/73)  BP(mean): 91 (19 Feb 2024 08:00) (68 - 91)  ABP: --  ABP(mean): --  RR: 16 (19 Feb 2024 08:00) (12 - 19)  SpO2: 100% (19 Feb 2024 08:04) (97% - 100%)    O2 Parameters below as of 19 Feb 2024 08:00  Patient On (Oxygen Delivery Method): ventilator    O2 Concentration (%): 30      Mode: AC/ CMV (Assist Control/ Continuous Mandatory Ventilation), RR (machine): 12, TV (machine): 0.45, FiO2: 30, PEEP: 5, ITime: 0.75, MAP: 11, PIP: 32    02-18 @ 07:01  -  02-19 @ 07:00  --------------------------------------------------------  IN: 1180 mL / OUT: 1025 mL / NET: 155 mL      CAPILLARY BLOOD GLUCOSE      POCT Blood Glucose.: 223 mg/dL (19 Feb 2024 05:18)      HOSPITAL MEDICATIONS:  MEDICATIONS  (STANDING):  albuterol/ipratropium for Nebulization 3 milliLiter(s) Nebulizer every 6 hours  artificial  tears Solution 1 Drop(s) Left EYE four times a day  chlorhexidine 0.12% Liquid 15 milliLiter(s) Oral Mucosa every 12 hours  dextrose 5%. 1000 milliLiter(s) (100 mL/Hr) IV Continuous <Continuous>  dextrose 5%. 1000 milliLiter(s) (50 mL/Hr) IV Continuous <Continuous>  dextrose 50% Injectable 25 Gram(s) IV Push once  dextrose 50% Injectable 12.5 Gram(s) IV Push once  dextrose 50% Injectable 25 Gram(s) IV Push once  doxazosin 2 milliGRAM(s) Oral at bedtime  escitalopram 10 milliGRAM(s) Oral daily  glucagon  Injectable 1 milliGRAM(s) IntraMuscular once  insulin glargine Injectable (LANTUS) 12 Unit(s) SubCutaneous <User Schedule>  insulin lispro (ADMELOG) corrective regimen sliding scale   SubCutaneous every 6 hours  levETIRAcetam  IVPB 500 milliGRAM(s) IV Intermittent every 12 hours  midodrine 30 milliGRAM(s) Oral every 8 hours  pantoprazole  Injectable 40 milliGRAM(s) IV Push every 12 hours  petrolatum Ophthalmic Ointment 1 Application(s) Left EYE at bedtime  sodium chloride 3%  Inhalation 4 milliLiter(s) Inhalation two times a day  sucralfate suspension 1 Gram(s) Enteral Tube two times a day    MEDICATIONS  (PRN):  dextrose Oral Gel 15 Gram(s) Oral once PRN Blood Glucose LESS THAN 70 milliGRAM(s)/deciliter  tranexamic acid Injectable for Nebulization 500 milliGRAM(s) Inhalation every 8 hours PRN hemoptysis      LABS:                        9.0    7.68  )-----------( 316      ( 18 Feb 2024 05:00 )             28.9     02-18    143  |  103  |  25<H>  ----------------------------<  182<H>  3.5   |  29  |  1.63<H>    Ca    8.4      18 Feb 2024 05:00  Phos  2.7     02-18  Mg     2.10     02-18      PT/INR - ( 17 Feb 2024 17:59 )   PT: 11.6 sec;   INR: 1.03 ratio         PTT - ( 17 Feb 2024 17:59 )  PTT:31.9 sec  Urinalysis Basic - ( 18 Feb 2024 05:00 )    Color: x / Appearance: x / SG: x / pH: x  Gluc: 182 mg/dL / Ketone: x  / Bili: x / Urobili: x   Blood: x / Protein: x / Nitrite: x   Leuk Esterase: x / RBC: x / WBC x   Sq Epi: x / Non Sq Epi: x / Bacteria: x            PAST MEDICAL & SURGICAL HISTORY:  Hyperlipemia      Hypertension      Coronary Artery Disease      Diabetes Mellitus Type II      Stented Coronary Artery  total 5 stents, last stent 5/2019      Neuropathy      Myocardial infarction      Stroke  mild left facial numbness   no other residuals verbalized      Myoclonic jerking      Stage 3 chronic kidney disease      History of Cataract Extraction      Hx of CABG      H/O coronary angiogram      S/P coronary artery stent placement  1/6/09      S/P placement of cardiac pacemaker          FAMILY HISTORY:  No pertinent family history in first degree relatives        Social History:  Lives with family  Ambulates with walker  Denies tobacco, EtOH, or illicit substance use (23 Dec 2023 22:51)      RADIOLOGY:  [ ] Reviewed and interpreted by me    PULMONARY FUNCTION TESTS:    EKG: CHIEF COMPLAINT: Patient is a 89y old  Male who presents with a chief complaint of COVID19, Chest pain (18 Feb 2024 09:51)    Interval Events: None reported overnight. Clinically unchanged. No new nursing issues reported overnight. VSS, and medications reviewed.                         No further bleeding noted from trach. BP much improved, will wean Midodrine as tolerated.                         Will consult EP to interrogate PPM tomorrow                        Plan to keep NPO for possible G tube placement tomorrow    REVIEW OF SYSTEMS:  See above  [x] Unable to assess ROS because Pt is vented    Mode: AC/ CMV (Assist Control/ Continuous Mandatory Ventilation), RR (machine): 12, TV (machine): 0.45, FiO2: 30, PEEP: 5, ITime: 0.75, MAP: 11, PIP: 32    OBJECTIVE:  ICU Vital Signs Last 24 Hrs  T(C): 37.1 (19 Feb 2024 04:00), Max: 37.7 (18 Feb 2024 16:00)  T(F): 98.8 (19 Feb 2024 04:00), Max: 99.9 (18 Feb 2024 16:00)  HR: 96 (19 Feb 2024 08:04) (47 - 102)  BP: 139/73 (19 Feb 2024 08:00) (115/50 - 139/73)  BP(mean): 91 (19 Feb 2024 08:00) (68 - 91)  ABP: --  ABP(mean): --  RR: 16 (19 Feb 2024 08:00) (12 - 19)  SpO2: 100% (19 Feb 2024 08:04) (97% - 100%)    O2 Parameters below as of 19 Feb 2024 08:00  Patient On (Oxygen Delivery Method): ventilator    O2 Concentration (%): 30    Mode: AC/ CMV (Assist Control/ Continuous Mandatory Ventilation), RR (machine): 12, TV (machine): 0.45, FiO2: 30, PEEP: 5, ITime: 0.75, MAP: 11, PIP: 32    02-18 @ 07:01  -  02-19 @ 07:00  --------------------------------------------------------  IN: 1180 mL / OUT: 1025 mL / NET: 155 mL    CAPILLARY BLOOD GLUCOSE    POCT Blood Glucose.: 223 mg/dL (19 Feb 2024 05:18)    HOSPITAL MEDICATIONS:  MEDICATIONS  (STANDING):  albuterol/ipratropium for Nebulization 3 milliLiter(s) Nebulizer every 6 hours  artificial  tears Solution 1 Drop(s) Left EYE four times a day  chlorhexidine 0.12% Liquid 15 milliLiter(s) Oral Mucosa every 12 hours  dextrose 5%. 1000 milliLiter(s) (100 mL/Hr) IV Continuous <Continuous>  dextrose 5%. 1000 milliLiter(s) (50 mL/Hr) IV Continuous <Continuous>  dextrose 50% Injectable 25 Gram(s) IV Push once  dextrose 50% Injectable 12.5 Gram(s) IV Push once  dextrose 50% Injectable 25 Gram(s) IV Push once  doxazosin 2 milliGRAM(s) Oral at bedtime  escitalopram 10 milliGRAM(s) Oral daily  glucagon  Injectable 1 milliGRAM(s) IntraMuscular once  insulin glargine Injectable (LANTUS) 12 Unit(s) SubCutaneous <User Schedule>  insulin lispro (ADMELOG) corrective regimen sliding scale   SubCutaneous every 6 hours  levETIRAcetam  IVPB 500 milliGRAM(s) IV Intermittent every 12 hours  midodrine 30 milliGRAM(s) Oral every 8 hours  pantoprazole  Injectable 40 milliGRAM(s) IV Push every 12 hours  petrolatum Ophthalmic Ointment 1 Application(s) Left EYE at bedtime  sodium chloride 3%  Inhalation 4 milliLiter(s) Inhalation two times a day  sucralfate suspension 1 Gram(s) Enteral Tube two times a day    MEDICATIONS  (PRN):  dextrose Oral Gel 15 Gram(s) Oral once PRN Blood Glucose LESS THAN 70 milliGRAM(s)/deciliter  tranexamic acid Injectable for Nebulization 500 milliGRAM(s) Inhalation every 8 hours PRN hemoptysis    PHYSICAL EXAM:  General: Laying in bed comfortably, NAD   HEENT:(+)NGT in R-nare  Neck: (+) Trach tube noted, site c/d/i.  HEART: +s1, s2  LUNGS: +coarse breath sounds. No resp distress.   GI: +BS, soft, nt/nd, no peritoneal signs noted   Extremities: 1-2+ b/l pitting LE edema extending to below knee region.   NEURO: awake, eyes open. PERRL. Nonverbal. Not following commands. Unresponsive.   Skin: as per RN assessment sheet     LABS:                        9.0    7.68  )-----------( 316      ( 18 Feb 2024 05:00 )             28.9     02-18    143  |  103  |  25<H>  ----------------------------<  182<H>  3.5   |  29  |  1.63<H>    Ca    8.4      18 Feb 2024 05:00  Phos  2.7     02-18  Mg     2.10     02-18    PT/INR - ( 17 Feb 2024 17:59 )   PT: 11.6 sec;   INR: 1.03 ratio         PTT - ( 17 Feb 2024 17:59 )  PTT:31.9 sec  Urinalysis Basic - ( 18 Feb 2024 05:00 )    Color: x / Appearance: x / SG: x / pH: x  Gluc: 182 mg/dL / Ketone: x  / Bili: x / Urobili: x   Blood: x / Protein: x / Nitrite: x   Leuk Esterase: x / RBC: x / WBC x   Sq Epi: x / Non Sq Epi: x / Bacteria: x    PAST MEDICAL & SURGICAL HISTORY:  Hyperlipemia    Hypertension    Coronary Artery Disease    Diabetes Mellitus Type II    Stented Coronary Artery  total 5 stents, last stent 5/2019    Neuropathy    Myocardial infarction    Stroke  mild left facial numbness   no other residuals verbalized    Myoclonic jerking    Stage 3 chronic kidney disease    History of Cataract Extraction    Hx of CABG    H/O coronary angiogram    S/P coronary artery stent placement  1/6/09    S/P placement of cardiac pacemaker    FAMILY HISTORY:  No pertinent family history in first degree relatives    Social History:  Lives with family  Ambulates with walker  Denies tobacco, EtOH, or illicit substance use (23 Dec 2023 22:51)    RADIOLOGY:  [ ] Reviewed and interpreted by me    PULMONARY FUNCTION TESTS:    EKG:

## 2024-02-19 NOTE — PROGRESS NOTE ADULT - ASSESSMENT
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       NEUROLOGY  #CVA  - prior CVA x3 with no residual deficits  - c/w Aspirin 81 mg QD    #Myoclonic jerks  - on Keppra 500 mg BID, per neuro wishing to wean however per family request will continue at 500 mg BID, but now off valproate per neuro  - holding home gabapentin and memantine in setting of somnolence  - Continue Lexapro     #AMS  - CT/CTA negative for stroke. EEG now off   - 2/8 MRI brain - unremarkable findings. Family now amenable to Trach/PEG.     CARDIOVASCULAR  #HTN  - home amlodipine and metoprolol on hold  - Midodrine 30 q8h, wean as tolerated. Off pressors 2/16   - HR noted in 48s, but no heart rate pacing, EKG noted w RBBB and HR 91  - Will continue to monitor closely    #CAD  - on aspirin, holding Brilinta and ranolazine  - Need to restart brilinta ASAP after Trach/PEG, last stent 2022    #Afib  - pt with known history of pAF, first observed 2/1  - can consider starting AC and/or rate control if persists     RESPIRATORY  #COVID  - s/p paxlovid and 1 dose Remdesivir while inpatient  - s/p Bronch on 2/18 - No active bleeding    #Pleural effusions b/l  - CT chest from 1/16 showing new small bilateral pleural effusions/ atelectasis/ patchy bilateral ground-glass opacities are consistent with pulmonary edema.  - Diuresis PRN based on amount of pleural effusions on POCUS    #AHRF  - Intubated for airway protection in s/o AMS, now trach on 2/13 -  - Holding brilinta for possible procedure. Will need to restart ASAP after trach/PEG  - s/p zosyn for aspiration PNA from 1/20- 1/28   - MRSA Swab positive for Staph Aureus only, started on Mupirocin (2/13 - )  - Restarted on Zosyn (2/12 - ). BAL culture negative  - Hypertonic saline 4 mL Q6H, Duoneb 3 mL q6H    GI  #UGIB  - s/p EGD 1/2/24 showing dieulafoy lesion and esophagitis likely 2/2 NGT irritation s/p 2 clips  - On Protonix IV BID     #Acute Cholecystitis   - 12/26 Distended gallbladder with cholelithiasis and sludge  - 12/29 HIDA scan positive for Acute Lynsey   - S/p Zosyn (12/24 - 12/31)  - 1/26 s/p percutaneous cholecystostomy tube placement by IR     #Nutrition   - tube feeds NGT   - Discussed w/ family, want PEG.   - Trach 2/13, per GI PEG only after afebrile x 48 hours. Next eval would be Tuesday, 2/20.     RENAL  #CKD3  - CTM renal function   - on Cardura   - Passed TOV 2/16    INFECTIOUS DISEASE  #COVID   - s/p paxlovid and 1 dose remdesivir    # PNA  - 1/16 CT chest showing ground glass opacities  - s/p zosyn   - intermittent episodes of fevers, likely source GI given choleycystitis? culture NGTD  - meropenem 5d course thru (1/28 - 2/1)  - newly hypothermic on 2/11, on Zosyn (2/12 - ), unclear source, work-up unrevealing, plan for 7-day course.  - RVP negative 2/11, Blood cultures 2/11 negative, UA 2/11 negative  - 2/13 Bronch studies NGTD     HEME/ VASCULAR  #Anemia  - CTM CBC daily     #Mesenteric ischemia  - CTA demonstrating mesenteric fat stranding associated with ascending/transverse colon.   - 12/24 s/p SMA angiography with stent placement with diagnostic laparoscopy, small bowel and visible colon viable, some inflammation of omentum in RUQ.  - On ASA, Brillinta currently on hold pending PEG eval. Need to resume after procedure.     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       NEUROLOGY  #CVA  - prior CVA x3 with no residual deficits  - Aspirin 81 mg QD (currently on hold with possible PEG placement)     #Myoclonic jerks  - on Keppra 500 mg BID, per neuro wishing to wean however per family request will continue at 500 mg BID, but now off valproate per neuro  - Holding home gabapentin and memantine in setting of somnolence  - Continue Lexapro     #AMS  - CT/CTA negative for stroke. EEG now off   - 2/8 MRI brain - unremarkable findings. Family now amenable to Trach/PEG.     CARDIOVASCULAR  #HTN  - home amlodipine and metoprolol on hold  - s/p Midodrine 30 q8h, wean to 15mg q8hr (2/19) with holding parameters    - HR noted in 48s, but no heart rate pacing, EKG noted w RBBB and HR 91  - Will continue to monitor closely, and consult EP to interrogate PPM     #CAD  - Aspirin, Brilinta (on hold), and ranolazine  - Need to restart Brilinta ASAP after Trach/PEG, last stent 2022    #Afib  - pt with known history of pAF, first observed 2/1  - can consider starting AC and/or rate control if persists     RESPIRATORY  #COVID  - s/p paxlovid and 1 dose Remdesivir while inpatient  - s/p Bronch on 2/18 - No active bleeding  - d'dangelo TXA on 2/19    #Pleural effusions b/l  - CT chest from 1/16 showing new small bilateral pleural effusions/ atelectasis/ patchy bilateral ground-glass opacities are consistent with pulmonary edema.  - Diuresis PRN based on amount of pleural effusions on POCUS    #AHRF  - Intubated for airway protection in s/o AMS, now trach on 2/13 -  - Holding brilinta for possible procedure. Will need to restart ASAP after trach/PEG  - s/p zosyn for aspiration PNA from 1/20- 1/28   - MRSA Swab positive for Staph Aureus only, started on Mupirocin (2/13 - )  - Restarted on Zosyn (2/12 - ). BAL culture negative  - Hypertonic saline 4 mL Q6H, Duoneb 3 mL q6H    GI  #UGIB  - s/p EGD 1/2/24 showing dieulafoy lesion and esophagitis likely 2/2 NGT irritation s/p 2 clips  - On Protonix IV BID     #Acute Cholecystitis   - 12/26 Distended gallbladder with cholelithiasis and sludge  - 12/29 HIDA scan positive for Acute Lynsey   - S/p Zosyn (12/24 - 12/31)  - 1/26 s/p percutaneous cholecystostomy tube placement by IR     #Nutrition   - tube feeds NGT   - Discussed w/ family, want PEG.   - Trach 2/13, per GI PEG only after afebrile x 48 hours. Next eval would be Tuesday, 2/20.   - NPO p MN tonight for possible PEG placement on 2/20    RENAL  #CKD3  - CTM renal function   - on Cardura   - Passed TOV 2/16    INFECTIOUS DISEASE  #COVID   - s/p Paxlovid and 1 dose Remdesivir    # PNA  - 1/16 CT chest showing ground glass opacities  - s/p zosyn   - intermittent episodes of fevers, likely source GI given cholecystitis culture NGTD  - meropenem 5d course thru (1/28 - 2/1)  - newly hypothermic on 2/11, on Zosyn (2/12 - ), unclear source, work-up unrevealing, plan for 7-day course.  - RVP negative 2/11, Blood cultures 2/11 negative, UA 2/11 negative  - 2/13 Bronch studies NGTD     HEME/ VASCULAR  #Anemia  - CTM CBC daily     #Mesenteric ischemia  - CTA demonstrating mesenteric fat stranding associated with ascending/transverse colon.   - 12/24 s/p SMA angiography with stent placement with diagnostic laparoscopy, small bowel and visible colon viable, some inflammation of omentum in RUQ.  - ASA, Brilinta currently on hold pending PEG eval. Need to resume after procedure.     #DVT PPX  -  Q8H    ENDOCRINE  #DM2  - A1c 9.3   - on Lantus 12U and q6 SSI    LINES/ TUBES  - NGT  - cholecystostomy tube 1/26     ETHICS/ GOC    - Trach/ eventual PEG    DISPO: TBD

## 2024-02-20 LAB
ANION GAP SERPL CALC-SCNC: 10 MMOL/L — SIGNIFICANT CHANGE UP (ref 7–14)
APTT BLD: 30.4 SEC — SIGNIFICANT CHANGE UP (ref 24.5–35.6)
BUN SERPL-MCNC: 25 MG/DL — HIGH (ref 7–23)
CALCIUM SERPL-MCNC: 8.4 MG/DL — SIGNIFICANT CHANGE UP (ref 8.4–10.5)
CHLORIDE SERPL-SCNC: 103 MMOL/L — SIGNIFICANT CHANGE UP (ref 98–107)
CO2 SERPL-SCNC: 28 MMOL/L — SIGNIFICANT CHANGE UP (ref 22–31)
CREAT SERPL-MCNC: 1.46 MG/DL — HIGH (ref 0.5–1.3)
EGFR: 45 ML/MIN/1.73M2 — LOW
GLUCOSE BLDC GLUCOMTR-MCNC: 155 MG/DL — HIGH (ref 70–99)
GLUCOSE BLDC GLUCOMTR-MCNC: 156 MG/DL — HIGH (ref 70–99)
GLUCOSE BLDC GLUCOMTR-MCNC: 156 MG/DL — HIGH (ref 70–99)
GLUCOSE BLDC GLUCOMTR-MCNC: 176 MG/DL — HIGH (ref 70–99)
GLUCOSE BLDC GLUCOMTR-MCNC: 178 MG/DL — HIGH (ref 70–99)
GLUCOSE BLDC GLUCOMTR-MCNC: 185 MG/DL — HIGH (ref 70–99)
GLUCOSE SERPL-MCNC: 152 MG/DL — HIGH (ref 70–99)
HCT VFR BLD CALC: 28 % — LOW (ref 39–50)
HGB BLD-MCNC: 8.5 G/DL — LOW (ref 13–17)
INR BLD: 1.05 RATIO — SIGNIFICANT CHANGE UP (ref 0.85–1.18)
MAGNESIUM SERPL-MCNC: 2 MG/DL — SIGNIFICANT CHANGE UP (ref 1.6–2.6)
MCHC RBC-ENTMCNC: 28.4 PG — SIGNIFICANT CHANGE UP (ref 27–34)
MCHC RBC-ENTMCNC: 30.4 GM/DL — LOW (ref 32–36)
MCV RBC AUTO: 93.6 FL — SIGNIFICANT CHANGE UP (ref 80–100)
NON-GYNECOLOGICAL CYTOLOGY STUDY: SIGNIFICANT CHANGE UP
NRBC # BLD: 0 /100 WBCS — SIGNIFICANT CHANGE UP (ref 0–0)
NRBC # FLD: 0 K/UL — SIGNIFICANT CHANGE UP (ref 0–0)
PHOSPHATE SERPL-MCNC: 2.8 MG/DL — SIGNIFICANT CHANGE UP (ref 2.5–4.5)
PLATELET # BLD AUTO: 313 K/UL — SIGNIFICANT CHANGE UP (ref 150–400)
POTASSIUM SERPL-MCNC: 3.8 MMOL/L — SIGNIFICANT CHANGE UP (ref 3.5–5.3)
POTASSIUM SERPL-SCNC: 3.8 MMOL/L — SIGNIFICANT CHANGE UP (ref 3.5–5.3)
PROTHROM AB SERPL-ACNC: 11.7 SEC — SIGNIFICANT CHANGE UP (ref 9.5–13)
RBC # BLD: 2.99 M/UL — LOW (ref 4.2–5.8)
RBC # FLD: 16.8 % — HIGH (ref 10.3–14.5)
SODIUM SERPL-SCNC: 141 MMOL/L — SIGNIFICANT CHANGE UP (ref 135–145)
WBC # BLD: 9 K/UL — SIGNIFICANT CHANGE UP (ref 3.8–10.5)
WBC # FLD AUTO: 9 K/UL — SIGNIFICANT CHANGE UP (ref 3.8–10.5)

## 2024-02-20 PROCEDURE — 99232 SBSQ HOSP IP/OBS MODERATE 35: CPT

## 2024-02-20 PROCEDURE — 43246 EGD PLACE GASTROSTOMY TUBE: CPT | Mod: GC

## 2024-02-20 PROCEDURE — 99233 SBSQ HOSP IP/OBS HIGH 50: CPT

## 2024-02-20 PROCEDURE — 93288 INTERROG EVL PM/LDLS PM IP: CPT | Mod: 26

## 2024-02-20 DEVICE — KIT PEG ENDOVIVE ENFIT 20FR PUSH 2/BX: Type: IMPLANTABLE DEVICE | Status: FUNCTIONAL

## 2024-02-20 RX ORDER — SODIUM CHLORIDE 9 MG/ML
1000 INJECTION, SOLUTION INTRAVENOUS
Refills: 0 | Status: DISCONTINUED | OUTPATIENT
Start: 2024-02-20 | End: 2024-02-21

## 2024-02-20 RX ORDER — INSULIN GLARGINE 100 [IU]/ML
8 INJECTION, SOLUTION SUBCUTANEOUS ONCE
Refills: 0 | Status: COMPLETED | OUTPATIENT
Start: 2024-02-20 | End: 2024-02-20

## 2024-02-20 RX ORDER — CHLORHEXIDINE GLUCONATE 213 G/1000ML
1 SOLUTION TOPICAL DAILY
Refills: 0 | Status: DISCONTINUED | OUTPATIENT
Start: 2024-02-20 | End: 2024-03-27

## 2024-02-20 RX ORDER — ASPIRIN/CALCIUM CARB/MAGNESIUM 324 MG
81 TABLET ORAL DAILY
Refills: 0 | Status: DISCONTINUED | OUTPATIENT
Start: 2024-02-20 | End: 2024-03-27

## 2024-02-20 RX ADMIN — Medication 1 GRAM(S): at 05:15

## 2024-02-20 RX ADMIN — SODIUM CHLORIDE 4 MILLILITER(S): 9 INJECTION INTRAMUSCULAR; INTRAVENOUS; SUBCUTANEOUS at 22:13

## 2024-02-20 RX ADMIN — Medication 1 DROP(S): at 12:37

## 2024-02-20 RX ADMIN — LEVETIRACETAM 400 MILLIGRAM(S): 250 TABLET, FILM COATED ORAL at 18:40

## 2024-02-20 RX ADMIN — LEVETIRACETAM 400 MILLIGRAM(S): 250 TABLET, FILM COATED ORAL at 05:18

## 2024-02-20 RX ADMIN — INSULIN GLARGINE 8 UNIT(S): 100 INJECTION, SOLUTION SUBCUTANEOUS at 12:36

## 2024-02-20 RX ADMIN — SODIUM CHLORIDE 4 MILLILITER(S): 9 INJECTION INTRAMUSCULAR; INTRAVENOUS; SUBCUTANEOUS at 10:22

## 2024-02-20 RX ADMIN — SODIUM CHLORIDE 50 MILLILITER(S): 9 INJECTION, SOLUTION INTRAVENOUS at 12:36

## 2024-02-20 RX ADMIN — PANTOPRAZOLE SODIUM 40 MILLIGRAM(S): 20 TABLET, DELAYED RELEASE ORAL at 05:13

## 2024-02-20 RX ADMIN — Medication 1 DROP(S): at 05:13

## 2024-02-20 RX ADMIN — Medication 3 MILLILITER(S): at 22:12

## 2024-02-20 RX ADMIN — Medication 2: at 18:43

## 2024-02-20 RX ADMIN — Medication 1 DROP(S): at 18:40

## 2024-02-20 RX ADMIN — Medication 1 APPLICATION(S): at 22:58

## 2024-02-20 RX ADMIN — Medication 3 MILLILITER(S): at 04:10

## 2024-02-20 RX ADMIN — PANTOPRAZOLE SODIUM 40 MILLIGRAM(S): 20 TABLET, DELAYED RELEASE ORAL at 18:40

## 2024-02-20 RX ADMIN — Medication 2: at 00:02

## 2024-02-20 RX ADMIN — Medication 2: at 05:42

## 2024-02-20 RX ADMIN — CHLORHEXIDINE GLUCONATE 15 MILLILITER(S): 213 SOLUTION TOPICAL at 18:40

## 2024-02-20 RX ADMIN — CHLORHEXIDINE GLUCONATE 15 MILLILITER(S): 213 SOLUTION TOPICAL at 05:15

## 2024-02-20 RX ADMIN — Medication 3 MILLILITER(S): at 10:21

## 2024-02-20 RX ADMIN — Medication 3 MILLILITER(S): at 15:22

## 2024-02-20 RX ADMIN — Medication 1 DROP(S): at 00:06

## 2024-02-20 RX ADMIN — Medication 2: at 12:37

## 2024-02-20 RX ADMIN — Medication 2: at 23:52

## 2024-02-20 NOTE — PROGRESS NOTE ADULT - SUBJECTIVE AND OBJECTIVE BOX
CHIEF COMPLAINT: Patient is a 89y old  Male who presents with a chief complaint of COVID19, Chest pain (19 Feb 2024 10:09)      Interval Events:    REVIEW OF SYSTEMS:  [ ] All other systems negative  [ ] Unable to assess ROS because ________    Mode: AC/ CMV (Assist Control/ Continuous Mandatory Ventilation), RR (machine): 12, TV (machine): 450, FiO2: 30, PEEP: 5, ITime: 0.6, MAP: 11, PIP: 37      OBJECTIVE:  ICU Vital Signs Last 24 Hrs  T(C): 36.8 (20 Feb 2024 09:14), Max: 37.1 (19 Feb 2024 19:26)  T(F): 98.2 (20 Feb 2024 09:14), Max: 98.7 (19 Feb 2024 19:26)  HR: 114 (20 Feb 2024 09:14) (52 - 115)  BP: 126/62 (20 Feb 2024 09:14) (120/62 - 137/67)  BP(mean): 88 (19 Feb 2024 16:00) (88 - 88)  ABP: --  ABP(mean): --  RR: 16 (20 Feb 2024 09:14) (14 - 16)  SpO2: 99% (20 Feb 2024 09:14) (96% - 100%)    O2 Parameters below as of 20 Feb 2024 09:14  Patient On (Oxygen Delivery Method): ventilator    O2 Concentration (%): 30      Mode: AC/ CMV (Assist Control/ Continuous Mandatory Ventilation), RR (machine): 12, TV (machine): 450, FiO2: 30, PEEP: 5, ITime: 0.6, MAP: 11, PIP: 37    02-19 @ 07:01  -  02-20 @ 07:00  --------------------------------------------------------  IN: 910 mL / OUT: 350 mL / NET: 560 mL      CAPILLARY BLOOD GLUCOSE      POCT Blood Glucose.: 156 mg/dL (20 Feb 2024 05:40)      HOSPITAL MEDICATIONS:  MEDICATIONS  (STANDING):  albuterol/ipratropium for Nebulization 3 milliLiter(s) Nebulizer every 6 hours  artificial  tears Solution 1 Drop(s) Left EYE four times a day  chlorhexidine 0.12% Liquid 15 milliLiter(s) Oral Mucosa every 12 hours  dextrose 5%. 1000 milliLiter(s) (50 mL/Hr) IV Continuous <Continuous>  dextrose 5%. 1000 milliLiter(s) (100 mL/Hr) IV Continuous <Continuous>  dextrose 50% Injectable 25 Gram(s) IV Push once  dextrose 50% Injectable 12.5 Gram(s) IV Push once  dextrose 50% Injectable 25 Gram(s) IV Push once  doxazosin 2 milliGRAM(s) Oral at bedtime  escitalopram 10 milliGRAM(s) Oral daily  glucagon  Injectable 1 milliGRAM(s) IntraMuscular once  insulin glargine Injectable (LANTUS) 12 Unit(s) SubCutaneous <User Schedule>  insulin lispro (ADMELOG) corrective regimen sliding scale   SubCutaneous every 6 hours  levETIRAcetam  IVPB 500 milliGRAM(s) IV Intermittent every 12 hours  midodrine 15 milliGRAM(s) Oral every 8 hours  pantoprazole  Injectable 40 milliGRAM(s) IV Push every 12 hours  petrolatum Ophthalmic Ointment 1 Application(s) Left EYE at bedtime  sodium chloride 3%  Inhalation 4 milliLiter(s) Inhalation two times a day  sucralfate suspension 1 Gram(s) Enteral Tube two times a day    MEDICATIONS  (PRN):  dextrose Oral Gel 15 Gram(s) Oral once PRN Blood Glucose LESS THAN 70 milliGRAM(s)/deciliter      LABS:                        8.5    9.00  )-----------( 313      ( 20 Feb 2024 05:00 )             28.0     02-20    141  |  103  |  25<H>  ----------------------------<  152<H>  3.8   |  28  |  1.46<H>    Ca    8.4      20 Feb 2024 05:00  Phos  2.8     02-20  Mg     2.00     02-20      PT/INR - ( 20 Feb 2024 05:00 )   PT: 11.7 sec;   INR: 1.05 ratio         PTT - ( 20 Feb 2024 05:00 )  PTT:30.4 sec  Urinalysis Basic - ( 20 Feb 2024 05:00 )    Color: x / Appearance: x / SG: x / pH: x  Gluc: 152 mg/dL / Ketone: x  / Bili: x / Urobili: x   Blood: x / Protein: x / Nitrite: x   Leuk Esterase: x / RBC: x / WBC x   Sq Epi: x / Non Sq Epi: x / Bacteria: x            PAST MEDICAL & SURGICAL HISTORY:  Hyperlipemia      Hypertension      Coronary Artery Disease      Diabetes Mellitus Type II      Stented Coronary Artery  total 5 stents, last stent 5/2019      Neuropathy      Myocardial infarction      Stroke  mild left facial numbness   no other residuals verbalized      Myoclonic jerking      Stage 3 chronic kidney disease      History of Cataract Extraction      Hx of CABG      H/O coronary angiogram      S/P coronary artery stent placement  1/6/09      S/P placement of cardiac pacemaker          FAMILY HISTORY:  No pertinent family history in first degree relatives        Social History:  Lives with family  Ambulates with walker  Denies tobacco, EtOH, or illicit substance use (23 Dec 2023 22:51)      RADIOLOGY:  [ ] Reviewed and interpreted by me    PULMONARY FUNCTION TESTS:    EKG: CHIEF COMPLAINT: Patient is a 89y old  Male who presents with a chief complaint of COVID19, Chest pain (19 Feb 2024 10:09)    Interval Events: None overnight. Clinically unchanged. No issues with bradycardia reported. Pt is scheduled for PEG tube placement today w GI. has been NPO since MN, and started on maintenance IVF. AM labs, VSS, and medications reviewed.     REVIEW OF SYSTEMS:  See above   [x] Unable to assess ROS because Pt is vented/poor mental status     Mode: AC/ CMV (Assist Control/ Continuous Mandatory Ventilation), RR (machine): 12, TV (machine): 450, FiO2: 30, PEEP: 5, ITime: 0.6, MAP: 11, PIP: 37    OBJECTIVE:  ICU Vital Signs Last 24 Hrs  T(C): 36.8 (20 Feb 2024 09:14), Max: 37.1 (19 Feb 2024 19:26)  T(F): 98.2 (20 Feb 2024 09:14), Max: 98.7 (19 Feb 2024 19:26)  HR: 114 (20 Feb 2024 09:14) (52 - 115)  BP: 126/62 (20 Feb 2024 09:14) (120/62 - 137/67)  BP(mean): 88 (19 Feb 2024 16:00) (88 - 88)  ABP: --  ABP(mean): --  RR: 16 (20 Feb 2024 09:14) (14 - 16)  SpO2: 99% (20 Feb 2024 09:14) (96% - 100%)    O2 Parameters below as of 20 Feb 2024 09:14  Patient On (Oxygen Delivery Method): ventilator    O2 Concentration (%): 30    Mode: AC/ CMV (Assist Control/ Continuous Mandatory Ventilation), RR (machine): 12, TV (machine): 450, FiO2: 30, PEEP: 5, ITime: 0.6, MAP: 11, PIP: 37    02-19 @ 07:01  -  02-20 @ 07:00  --------------------------------------------------------  IN: 910 mL / OUT: 350 mL / NET: 560 mL    CAPILLARY BLOOD GLUCOSE    POCT Blood Glucose.: 156 mg/dL (20 Feb 2024 05:40)    HOSPITAL MEDICATIONS:  MEDICATIONS  (STANDING):  albuterol/ipratropium for Nebulization 3 milliLiter(s) Nebulizer every 6 hours  artificial  tears Solution 1 Drop(s) Left EYE four times a day  chlorhexidine 0.12% Liquid 15 milliLiter(s) Oral Mucosa every 12 hours  dextrose 5%. 1000 milliLiter(s) (50 mL/Hr) IV Continuous <Continuous>  dextrose 5%. 1000 milliLiter(s) (100 mL/Hr) IV Continuous <Continuous>  dextrose 50% Injectable 25 Gram(s) IV Push once  dextrose 50% Injectable 12.5 Gram(s) IV Push once  dextrose 50% Injectable 25 Gram(s) IV Push once  doxazosin 2 milliGRAM(s) Oral at bedtime  escitalopram 10 milliGRAM(s) Oral daily  glucagon  Injectable 1 milliGRAM(s) IntraMuscular once  insulin glargine Injectable (LANTUS) 12 Unit(s) SubCutaneous <User Schedule>  insulin lispro (ADMELOG) corrective regimen sliding scale   SubCutaneous every 6 hours  levETIRAcetam  IVPB 500 milliGRAM(s) IV Intermittent every 12 hours  midodrine 15 milliGRAM(s) Oral every 8 hours  pantoprazole  Injectable 40 milliGRAM(s) IV Push every 12 hours  petrolatum Ophthalmic Ointment 1 Application(s) Left EYE at bedtime  sodium chloride 3%  Inhalation 4 milliLiter(s) Inhalation two times a day  sucralfate suspension 1 Gram(s) Enteral Tube two times a day    MEDICATIONS  (PRN):  dextrose Oral Gel 15 Gram(s) Oral once PRN Blood Glucose LESS THAN 70 milliGRAM(s)/deciliter    PHYSICAL EXAM:  General: Laying in bed comfortably, NAD   HEENT:(+)NGT in R-nare  Neck: (+) Trach tube noted, site c/d/i.  HEART: +s1, s2  LUNGS: +coarse breath sounds. No resp distress.   GI: +BS, soft, nt/nd, no peritoneal signs noted   Extremities: 1-2+ b/l pitting LE edema extending to below knee region.   NEURO: awake, eyes open. PERRL. Nonverbal. Not following commands. Unresponsive.   Skin: as per RN assessment sheet     LABS:                        8.5    9.00  )-----------( 313      ( 20 Feb 2024 05:00 )             28.0     02-20    141  |  103  |  25<H>  ----------------------------<  152<H>  3.8   |  28  |  1.46<H>    Ca    8.4      20 Feb 2024 05:00  Phos  2.8     02-20  Mg     2.00     02-20      PT/INR - ( 20 Feb 2024 05:00 )   PT: 11.7 sec;   INR: 1.05 ratio         PTT - ( 20 Feb 2024 05:00 )  PTT:30.4 sec  Urinalysis Basic - ( 20 Feb 2024 05:00 )    Color: x / Appearance: x / SG: x / pH: x  Gluc: 152 mg/dL / Ketone: x  / Bili: x / Urobili: x   Blood: x / Protein: x / Nitrite: x   Leuk Esterase: x / RBC: x / WBC x   Sq Epi: x / Non Sq Epi: x / Bacteria: x    PAST MEDICAL & SURGICAL HISTORY:  Hyperlipemia    Hypertension    Coronary Artery Disease    Diabetes Mellitus Type II    Stented Coronary Artery  total 5 stents, last stent 5/2019    Neuropathy    Myocardial infarction    Stroke  mild left facial numbness   no other residuals verbalized    Myoclonic jerking    Stage 3 chronic kidney disease    History of Cataract Extraction    Hx of CABG    H/O coronary angiogram    S/P coronary artery stent placement  1/6/09    S/P placement of cardiac pacemaker    FAMILY HISTORY:  No pertinent family history in first degree relatives    Social History:  Lives with family  Ambulates with walker  Denies tobacco, EtOH, or illicit substance use (23 Dec 2023 22:51)      RADIOLOGY:  [ ] Reviewed and interpreted by me    PULMONARY FUNCTION TESTS:    EKG:

## 2024-02-20 NOTE — PROGRESS NOTE ADULT - SUBJECTIVE AND OBJECTIVE BOX
CC: F/U for Fever    Saw/spoke to patient. No fevers, no chills. No new complaints. Planned for PEG today.    Allergies  fluoroquinolone antibiotics (Other)  Cipro (Unknown)  Tegretol (Unknown)  carbamazepine (Other)    ANTIMICROBIALS:      PE:    Vital Signs Last 24 Hrs  T(C): 37.6 (20 Feb 2024 14:00), Max: 37.6 (20 Feb 2024 14:00)  T(F): 99.7 (20 Feb 2024 14:00), Max: 99.7 (20 Feb 2024 14:00)  HR: 93 (20 Feb 2024 14:00) (52 - 116)  BP: 127/65 (20 Feb 2024 14:00) (120/62 - 136/69)  BP(mean): 88 (19 Feb 2024 16:00) (88 - 88)  RR: 16 (20 Feb 2024 14:00) (14 - 16)  SpO2: 98% (20 Feb 2024 14:00) (96% - 99%)    Gen: AOx3, NAD, non-toxic  CV: Nontachycardic  Resp: Breathing comfortably, RA  Abd: Soft, nontender  IV/Skin: No thrombophlebitis    LABS:                        8.5    9.00  )-----------( 313      ( 20 Feb 2024 05:00 )             28.0     02-20    141  |  103  |  25<H>  ----------------------------<  152<H>  3.8   |  28  |  1.46<H>    Ca    8.4      20 Feb 2024 05:00  Phos  2.8     02-20  Mg     2.00     02-20    Urinalysis Basic - ( 20 Feb 2024 05:00 )    Color: x / Appearance: x / SG: x / pH: x  Gluc: 152 mg/dL / Ketone: x  / Bili: x / Urobili: x   Blood: x / Protein: x / Nitrite: x   Leuk Esterase: x / RBC: x / WBC x   Sq Epi: x / Non Sq Epi: x / Bacteria: x    MICROBIOLOGY:    .Bronchial Bronchial Lavage  02-13-24   Normal Respiratory Aurelia present  --    Numerous polymorphonuclear leukocytes seen per low power field  Rare Squamous epithelial cells seen per low power field  No organisms seen per oil power field    .Blood Blood-Peripheral  02-11-24   No growth at 5 days  --  --    .Body Fluid gallbladder  01-26-24   No growth at 5 days  --    No polymorphonuclear leukocytes per low power field  No organisms seen per oil power field    .Blood Blood  01-24-24   No growth at 5 days  --  --    .Bronchial Bronchial  01-22-24   Normal Respiratory Aurelia present  --    Moderate polymorphonuclear leukocytes seen per low power field  No squamous epithelial cells per low power field  No organisms seen per oil power field    RADIOLOGY:    2/17 XR    FINDINGS:    Pacemaker over left hemithorax with leads terminating the right atrial   appendage and right ventricle. Midline sternotomy wires.  The heart is enlarged..  Perihilar predominant interstitial opacification bilaterally likely   representing pulmonary edema. New right apical and basilar opacities.  There is no pneumothorax. Small right pleural effusion.  No acute bony abnormality.    IMPRESSION:    Mild cardiomegaly with bilateral pulmonary edema. Right-sided apical and   basilar focal opacities present on prior film and of uncertain etiology.   Infectious etiology cannot be ruled out in the appropriate clinical   setting.

## 2024-02-20 NOTE — PROGRESS NOTE ADULT - SUBJECTIVE AND OBJECTIVE BOX
NEPHROLOGY-[facility] [phone]        Patient seen and examined trach to [facility], for PEG today.         MEDICATIONS  (STANDING):  albuterol/ipratropium for Nebulization 3 milliLiter(s) Nebulizer every 6 hours  artificial  tears Solution 1 Drop(s) Left EYE four times a day  chlorhexidine 0.12% Liquid 15 milliLiter(s) Oral Mucosa every 12 hours  dextrose 5% + sodium chloride 0.9%. 1000 milliLiter(s) (50 mL/Hr) IV Continuous <Continuous>  dextrose 5%. 1000 milliLiter(s) (50 mL/Hr) IV Continuous <Continuous>  dextrose 5%. 1000 milliLiter(s) (100 mL/Hr) IV Continuous <Continuous>  dextrose 50% Injectable 25 Gram(s) IV Push once  dextrose 50% Injectable 25 Gram(s) IV Push once  dextrose 50% Injectable 12.5 Gram(s) IV Push once  doxazosin 2 milliGRAM(s) Oral at bedtime  escitalopram 10 milliGRAM(s) Oral daily  glucagon  Injectable 1 milliGRAM(s) IntraMuscular once  insulin glargine Injectable (LANTUS) 12 Unit(s) SubCutaneous <User Schedule>  insulin lispro (ADMELOG) corrective regimen sliding scale   SubCutaneous every 6 hours  levETIRAcetam  IVPB 500 milliGRAM(s) IV Intermittent every 12 hours  midodrine 15 milliGRAM(s) Oral every 8 hours  pantoprazole  Injectable 40 milliGRAM(s) IV Push every 12 hours  petrolatum Ophthalmic Ointment 1 Application(s) Left EYE at bedtime  sodium chloride 3%  Inhalation 4 milliLiter(s) Inhalation two times a day  sucralfate suspension 1 Gram(s) Enteral Tube two times a day      VITAL:  T(C): , Max: 37.1 (02-19-24 @ 19:26)  T(F): , Max: 98.7 (02-19-24 @ 19:26)  HR: 110 (02-20-24 @ 12:24)  BP: 126/62 (02-20-24 @ 09:14)  BP(mean): 88 (02-19-24 @ 16:00)  RR: 16 (02-20-24 @ 09:14)  SpO2: 99% (02-20-24 @ 12:24)  Wt(kg): --    I and O's:    02-19 @ 07:01  -  02-20 @ 07:00  --------------------------------------------------------  IN: 910 mL / OUT: 350 mL / NET: 560 mL          PHYSICAL EXAM:  Constitutional: NAD trach to vent   HEENT: +trach, + NGT  Respiratory: coarse bs   Cardiovascular: tachy s1s2  Gastrointestinal: BS+, soft, NT/ND  Extremities:+ peripheral edema  : no delong  Skin: No rashes      LABS:                        8.5    9.00  )-----------( 313      ( 20 Feb 2024 05:00 )             28.0     02-20    141  |  103  |  25<H>  ----------------------------<  152<H>  3.8   |  28  |  1.46<H>    Ca    8.4      20 Feb 2024 05:00  Phos  2.8     02-20  Mg     2.00     02-20            Urine Studies:  Urinalysis Basic - ( 20 Feb 2024 05:00 )    Color: x / Appearance: x / SG: x / pH: x  Gluc: 152 mg/dL / Ketone: x  / Bili: x / Urobili: x   Blood: x / Protein: x / Nitrite: x   Leuk Esterase: x / RBC: x / WBC x   Sq Epi: x / Non Sq Epi: x / Bacteria: x        IMPRESSION:  89M w/ DM2, HTN, CVA, PAD, CKD3, and CAD-CABG, 12/23/23 p/w COVID19 infection, c/b respiratory failure/hypotension; now s/p tracheostomy    (1)Renal - CKD3; superimposed mild prerenal azotemia - numbers fluctuating based on hemodynamic status  (2)Lytes - acceptable   (3)CV - now off pressors, on midodrine remains on cardura   (4)Pulm - vent-dependent   (5ID - afebrile, s/p zosyn   (6)GI - needing to be afebrile for 24h prior to PEG    RECOMMEND:   (1)No objection to PEG today   (2)Continue abx for GFR 30-40ml/min    family updated at bedside     Wendi Alvarenga NP  [facility]  [phone]

## 2024-02-20 NOTE — PACU DISCHARGE NOTE - MENTAL STATUS: PATIENT PARTICIPATION
Follow-up Progress Note      Patient Name: Farooq Boston   LISSETTEB: 1977   AGE:43 technician and written in [place] for a second time.    Diagnosis: Keratoconus, SP:Crosslinking OS        Plan: Continue with drops as instructed.                                                                                                                                              
Awake
Awake

## 2024-02-20 NOTE — PROGRESS NOTE ADULT - ASSESSMENT
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       NEUROLOGY  #CVA  - prior CVA x3 with no residual deficits  - Aspirin 81 mg QD (currently on hold with possible PEG placement)     #Myoclonic jerks  - on Keppra 500 mg BID, per neuro wishing to wean however per family request will continue at 500 mg BID, but now off valproate per neuro  - Holding home gabapentin and memantine in setting of somnolence  - Continue Lexapro     #AMS  - CT/CTA negative for stroke. EEG now off   - 2/8 MRI brain - unremarkable findings. Family now amenable to Trach/PEG.     CARDIOVASCULAR  #HTN  - home amlodipine and metoprolol on hold  - s/p Midodrine 30 q8h, wean to 15mg q8hr (2/19) with holding parameters    - HR noted in 48s, but no heart rate pacing, EKG noted w RBBB and HR 91  - Will continue to monitor closely, and consult EP to interrogate PPM     #CAD  - Aspirin, Brilinta (on hold), and ranolazine  - Need to restart Brilinta ASAP after Trach/PEG, last stent 2022    #Afib  - pt with known history of pAF, first observed 2/1  - can consider starting AC and/or rate control if persists     RESPIRATORY  #COVID  - s/p paxlovid and 1 dose Remdesivir while inpatient  - s/p Bronch on 2/18 - No active bleeding  - d'dangelo TXA on 2/19    #Pleural effusions b/l  - CT chest from 1/16 showing new small bilateral pleural effusions/ atelectasis/ patchy bilateral ground-glass opacities are consistent with pulmonary edema.  - Diuresis PRN based on amount of pleural effusions on POCUS    #AHRF  - Intubated for airway protection in s/o AMS, now trach on 2/13 -  - Holding brilinta for possible procedure. Will need to restart ASAP after trach/PEG  - s/p zosyn for aspiration PNA from 1/20- 1/28   - MRSA Swab positive for Staph Aureus only, started on Mupirocin (2/13 - )  - Restarted on Zosyn (2/12 - ). BAL culture negative  - Hypertonic saline 4 mL Q6H, Duoneb 3 mL q6H    GI  #UGIB  - s/p EGD 1/2/24 showing dieulafoy lesion and esophagitis likely 2/2 NGT irritation s/p 2 clips  - On Protonix IV BID     #Acute Cholecystitis   - 12/26 Distended gallbladder with cholelithiasis and sludge  - 12/29 HIDA scan positive for Acute Lynsey   - S/p Zosyn (12/24 - 12/31)  - 1/26 s/p percutaneous cholecystostomy tube placement by IR     #Nutrition   - tube feeds NGT   - Discussed w/ family, want PEG.   - Trach 2/13, per GI PEG only after afebrile x 48 hours. Next eval would be Tuesday, 2/20.   - NPO p MN tonight for possible PEG placement on 2/20    RENAL  #CKD3  - CTM renal function   - on Cardura   - Passed TOV 2/16    INFECTIOUS DISEASE  #COVID   - s/p Paxlovid and 1 dose Remdesivir    # PNA  - 1/16 CT chest showing ground glass opacities  - s/p zosyn   - intermittent episodes of fevers, likely source GI given cholecystitis culture NGTD  - meropenem 5d course thru (1/28 - 2/1)  - newly hypothermic on 2/11, on Zosyn (2/12 - ), unclear source, work-up unrevealing, plan for 7-day course.  - RVP negative 2/11, Blood cultures 2/11 negative, UA 2/11 negative  - 2/13 Bronch studies NGTD     HEME/ VASCULAR  #Anemia  - CTM CBC daily     #Mesenteric ischemia  - CTA demonstrating mesenteric fat stranding associated with ascending/transverse colon.   - 12/24 s/p SMA angiography with stent placement with diagnostic laparoscopy, small bowel and visible colon viable, some inflammation of omentum in RUQ.  - ASA, Brilinta currently on hold pending PEG eval. Need to resume after procedure.     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       NEUROLOGY  #CVA  - prior CVA x3 with no residual deficits  - Aspirin 81 mg QD (currently on hold with possible PEG placement)     #Myoclonic jerks  - on Keppra 500 mg BID, per neuro wishing to wean however per family request will continue at 500 mg BID, but now off valproate per neuro  - Holding home Gabapentin and Memantine in setting of somnolence  - Continue Lexapro     #AMS  - CT/CTA negative for stroke. EEG now off   - 2/8 MRI brain - unremarkable findings. Family now amenable to Trach/PEG.     CARDIOVASCULAR  #HTN  - home amlodipine and metoprolol on hold  - s/p Midodrine 30 q8h, wean to 15mg q8hr (2/19) with holding parameters    - HR noted in 48s on (2/18) from Pulse oxymetry??, but no heart rate pacing, EKG noted w RBBB and HR 91  - Will continue to monitor closely, and consult EP to interrogate PPM   - No issues reported since    #CAD  - Aspirin, Brilinta (on hold), and ranolazine  - Need to restart Brilinta ASAP after Trach/PEG, last stent 2022    #Afib  - pt with known history of pAF, first observed 2/1  - can consider starting AC and/or rate control if persists     RESPIRATORY  #COVID  - s/p paxlovid and 1 dose Remdesivir while inpatient  - s/p Bronch on 2/18 - No active bleeding  - d'dangelo TXA on 2/19    #Pleural effusions b/l  - CT chest from 1/16 showing new small bilateral pleural effusions/ atelectasis/ patchy bilateral ground-glass opacities are consistent with pulmonary edema.  - Diuresis PRN based on amount of pleural effusions on POCUS    #AHRF  - Intubated for airway protection in s/o AMS, now trach on 2/13 -  - Holding brilinta for possible procedure. Will need to restart ASAP after trach/PEG  - s/p zosyn for aspiration PNA from 1/20- 1/28   - MRSA Swab positive for Staph Aureus only, started on Mupirocin (2/13 - )  - Restarted on Zosyn (2/12 - ). BAL culture 2/13 - rare yeast/normal kristina, and no acid fast bacilli  - Hypertonic saline 4 mL Q6H, Duoneb 3 mL q6H    GI  #UGIB  - s/p EGD 1/2/24 showing dieulafoy lesion and esophagitis likely 2/2 NGT irritation s/p 2 clips  - On Protonix 40mg IV BID   - Plan for PEG tube placement with GI today, NPO since MN    #Acute Cholecystitis   - 12/26 Distended gallbladder with cholelithiasis and sludge  - 12/29 HIDA scan positive for Acute Lynsey   - S/p Zosyn (12/24 - 12/31)  - 1/26 s/p percutaneous cholecystostomy tube placement by IR     #Nutrition   - tube feeds NGT   - Discussed w/ family, want PEG.   - Plan for PEG Tube placement today with GI   - Will keep NPO and continue with Maintenance IVF - D5/NS @ 50cc/hr     RENAL  #CKD3  - CTM renal function   - on Cardura   - Passed TOV 2/16    INFECTIOUS DISEASE  #COVID   - s/p Paxlovid and 1 dose Remdesivir    # PNA  - 1/16 CT chest showing ground glass opacities  - s/p zosyn   - intermittent episodes of fevers, likely source GI given cholecystitis culture NGTD  - meropenem 5d course thru (1/28 - 2/1)  - newly hypothermic on 2/11, on Zosyn (2/12 - 2/19), unclear source, work-up unrevealing, plan for 7-day course.  - RVP negative 2/11, Blood cultures 2/11 negative, UA 2/11 negative  - 2/13 Bronch studies NGTD     HEME/ VASCULAR  #Anemia  - CTM CBC daily     #Mesenteric ischemia  - CTA demonstrating mesenteric fat stranding associated with ascending/transverse colon.   - 12/24 s/p SMA angiography with stent placement with diagnostic laparoscopy, small bowel and visible colon viable, some inflammation of omentum in RUQ.  - ASA, Brilinta currently on hold pending PEG eval. Need to resume after procedure.     #DVT PPX  -  Q8H    ENDOCRINE  #DM2  - A1c 9.3   - on Lantus 12U and q6 SSI (on hold for today)  - Will administer 8units Lantus at noon since patient has been NPO since MN     LINES/ TUBES  - NGT  - cholecystostomy tube 1/26     ETHICS/ GOC    - Trach/ PEG scheduled for today     DISPO: TBD

## 2024-02-20 NOTE — PROGRESS NOTE ADULT - NS ATTEND AMEND GEN_ALL_CORE FT
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 Course c/b acute cholecystitis s/p perc asa 1/26 by IR, also with recent SMA stent.     Patient failed trial of extubation, also possible sz. Now s/p tracheostomy awaiting a PEG. EEG without sz. Currently on Keppra. S/P another course of abx for fevers and hypotension.     #Acute hypoxemic/hypercapnic respiratory failure  #Multifocal Pneumonia  #Dysphagia  #Acute cholecystitis  #Encephalopathy  #Afib - New, intermittent  #SMA stent   #Petechial hemorrhages  - Frequent aspiration noted clinically and on CT scans. now reintubated, unable to clear secretions. 2/2 to poor cough and mental status. Now s/p tracheostomy.   - EEG now without s/w keppra 500.. MRI without acute findings.   - S/P course of antibiotics for MSSA pna as well as aspiration. Continue to monitor off abx. Recent culture NTD.   - C/W vent, adjust based on gas, PS as tolerated.  - S/P IR drainage of biliary tube, monitor output. Reconsult IR once acute issues resolve.   - new AFib x1, not on full ac at this time. was In the setting of critical illness, currently being monitored on tele and NSR.  - C/W ASA, Brilinta on hold for possible procedure. Per vascular can hold temporarily. Will restart soon as patient is s/p PEG.  - Seen by optho, continue to monitor  - Grossly overloaded with pulmonary edema and effusions. Lasix PRN.   - oropharyngeal dysphagia will need peg given repeated aspiration/ GI reconsulted.   - Pending PEG with GI today. NPO for now.   - DVT ppx - SQH  - Dispo- full code. 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 Course c/b acute cholecystitis s/p perc asa 1/26 by IR, also with recent SMA stent.     Patient failed trial of extubation, also possible sz. Now s/p tracheostomy awaiting a PEG. EEG without sz. Currently on Keppra. S/P another course of abx for fevers and hypotension.     #Acute hypoxemic/hypercapnic respiratory failure  #Multifocal Pneumonia  #Dysphagia  #Acute cholecystitis  #Encephalopathy  #Afib - New, intermittent  #SMA stent   #Petechial hemorrhages  - Frequent aspiration noted clinically and on CT scans. now reintubated, unable to clear secretions. 2/2 to poor cough and mental status. Now s/p tracheostomy.   - EEG now without s/w keppra 500.. MRI without acute findings.   - S/P course of antibiotics for MSSA pna as well as aspiration. Continue to monitor off abx. Recent culture NTD.   - C/W vent, adjust based on gas, PS as tolerated.  - S/P IR drainage of biliary tube, monitor output. Reconsult IR once acute issues resolve.   - new AFib, seen on interrogation, will f/u with family risk and benefits.   - C/W ASA, Brilinta on hold for possible procedure. Per vascular can hold temporarily. Will restart soon as patient is s/p PEG.  - Seen by optho, continue to monitor  - Grossly overloaded with pulmonary edema and effusions. Lasix PRN.   - oropharyngeal dysphagia will need peg given repeated aspiration/ GI reconsulted.   - Pending PEG with GI today. NPO for now.   - DVT ppx - SQH  - Dispo- full code.

## 2024-02-20 NOTE — PROGRESS NOTE ADULT - ASSESSMENT
89-yo M w/ PMH of CAD s/p CABG w/ stents, s/p PPM, DM, PVD, CVA, CKD, and myoclonic jerks, admitted on 12/23 i/s/o recent COVID.   CT C/A/P revealing findings suggestive of acute cholecystitis (12/24/23)  S/p exlap, mesenteric angiogram, and SMA stent (12/24)  HIDA (12/29) supported the diagnosis of acute cholecystitis. Treated medically (Zosyn 12/24-31)  LUE hematoma noted 1/10 on venous Duplex, w/ visualization of complex, avascular fluid collection (5.3 x 2.7 x 3.7 cm), possibly hematoma  Perc asa on 1/26  Given course of meropenem and stopped  On 2/12 restarted on Zosyn for episode of fever  LFTs WNL  S/p Trach 2/13  Now with intermittent fevers, plan for PEG--GI holding on PEG in setting fever  2/13 Bronch studies NGTD  BCXs 2/11 NGTD  WBC has been normal  Unclear source fever at this point in a patient with complex hospital course (occult infection? noninfectious reactive to stressors?)  S/pt Zosyn course, but uncertain if true infection  With onset eosinophilia, drug fever?  Fevers resolved?  Overall, Fever, cholecystitis s/p cholecystostomy  - S/p course Zosyn  - F/U BCXs  - Monitor fever curve, monitor for alternate signs infection  - Timing PEG per GI  - Eosinophils on CBC noted--drug allergy? Monitor for signs/symptoms allergic reaction. Check Strongy Abs (lower suspicion); trend eosinophilia    Signing off. Please call with further questions or change in status.    Rangel Bello MD  Contact on TEAMS messaging from 9am - 5pm  From 5pm-9am, on weekends, or if no response call [phone]

## 2024-02-21 LAB
GLUCOSE BLDC GLUCOMTR-MCNC: 127 MG/DL — HIGH (ref 70–99)
GLUCOSE BLDC GLUCOMTR-MCNC: 135 MG/DL — HIGH (ref 70–99)
GLUCOSE BLDC GLUCOMTR-MCNC: 135 MG/DL — HIGH (ref 70–99)
GLUCOSE BLDC GLUCOMTR-MCNC: 141 MG/DL — HIGH (ref 70–99)

## 2024-02-21 PROCEDURE — 93010 ELECTROCARDIOGRAM REPORT: CPT

## 2024-02-21 PROCEDURE — 99233 SBSQ HOSP IP/OBS HIGH 50: CPT

## 2024-02-21 RX ORDER — OXYCODONE HYDROCHLORIDE 5 MG/1
2.5 TABLET ORAL EVERY 4 HOURS
Refills: 0 | Status: DISCONTINUED | OUTPATIENT
Start: 2024-02-21 | End: 2024-02-23

## 2024-02-21 RX ORDER — ACETAMINOPHEN 500 MG
1000 TABLET ORAL ONCE
Refills: 0 | Status: COMPLETED | OUTPATIENT
Start: 2024-02-21 | End: 2024-02-21

## 2024-02-21 RX ORDER — HEPARIN SODIUM 5000 [USP'U]/ML
5000 INJECTION INTRAVENOUS; SUBCUTANEOUS EVERY 8 HOURS
Refills: 0 | Status: DISCONTINUED | OUTPATIENT
Start: 2024-02-21 | End: 2024-03-27

## 2024-02-21 RX ORDER — INSULIN GLARGINE 100 [IU]/ML
8 INJECTION, SOLUTION SUBCUTANEOUS ONCE
Refills: 0 | Status: COMPLETED | OUTPATIENT
Start: 2024-02-21 | End: 2024-02-21

## 2024-02-21 RX ADMIN — LEVETIRACETAM 400 MILLIGRAM(S): 250 TABLET, FILM COATED ORAL at 18:03

## 2024-02-21 RX ADMIN — Medication 1 APPLICATION(S): at 22:37

## 2024-02-21 RX ADMIN — PANTOPRAZOLE SODIUM 40 MILLIGRAM(S): 20 TABLET, DELAYED RELEASE ORAL at 18:04

## 2024-02-21 RX ADMIN — Medication 3 MILLILITER(S): at 21:49

## 2024-02-21 RX ADMIN — Medication 1 GRAM(S): at 18:04

## 2024-02-21 RX ADMIN — CHLORHEXIDINE GLUCONATE 15 MILLILITER(S): 213 SOLUTION TOPICAL at 05:53

## 2024-02-21 RX ADMIN — Medication 81 MILLIGRAM(S): at 11:40

## 2024-02-21 RX ADMIN — PANTOPRAZOLE SODIUM 40 MILLIGRAM(S): 20 TABLET, DELAYED RELEASE ORAL at 05:58

## 2024-02-21 RX ADMIN — Medication 1 DROP(S): at 05:53

## 2024-02-21 RX ADMIN — Medication 3 MILLILITER(S): at 03:50

## 2024-02-21 RX ADMIN — Medication 3 MILLILITER(S): at 16:20

## 2024-02-21 RX ADMIN — OXYCODONE HYDROCHLORIDE 2.5 MILLIGRAM(S): 5 TABLET ORAL at 16:08

## 2024-02-21 RX ADMIN — SODIUM CHLORIDE 4 MILLILITER(S): 9 INJECTION INTRAMUSCULAR; INTRAVENOUS; SUBCUTANEOUS at 10:51

## 2024-02-21 RX ADMIN — SODIUM CHLORIDE 4 MILLILITER(S): 9 INJECTION INTRAMUSCULAR; INTRAVENOUS; SUBCUTANEOUS at 21:50

## 2024-02-21 RX ADMIN — Medication 400 MILLIGRAM(S): at 11:35

## 2024-02-21 RX ADMIN — Medication 1000 MILLIGRAM(S): at 12:05

## 2024-02-21 RX ADMIN — CHLORHEXIDINE GLUCONATE 1 APPLICATION(S): 213 SOLUTION TOPICAL at 11:36

## 2024-02-21 RX ADMIN — INSULIN GLARGINE 8 UNIT(S): 100 INJECTION, SOLUTION SUBCUTANEOUS at 11:35

## 2024-02-21 RX ADMIN — Medication 1 DROP(S): at 00:48

## 2024-02-21 RX ADMIN — Medication 1 DROP(S): at 18:03

## 2024-02-21 RX ADMIN — Medication 2 MILLIGRAM(S): at 22:29

## 2024-02-21 RX ADMIN — CHLORHEXIDINE GLUCONATE 15 MILLILITER(S): 213 SOLUTION TOPICAL at 18:03

## 2024-02-21 RX ADMIN — Medication 3 MILLILITER(S): at 10:50

## 2024-02-21 RX ADMIN — HEPARIN SODIUM 5000 UNIT(S): 5000 INJECTION INTRAVENOUS; SUBCUTANEOUS at 22:34

## 2024-02-21 RX ADMIN — OXYCODONE HYDROCHLORIDE 2.5 MILLIGRAM(S): 5 TABLET ORAL at 15:38

## 2024-02-21 RX ADMIN — ESCITALOPRAM OXALATE 10 MILLIGRAM(S): 10 TABLET, FILM COATED ORAL at 11:35

## 2024-02-21 RX ADMIN — Medication 1 DROP(S): at 11:35

## 2024-02-21 NOTE — PROGRESS NOTE ADULT - ASSESSMENT
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       NEUROLOGY  #CVA  - prior CVA x3 with no residual deficits  - Aspirin 81 mg QD (currently on hold with possible PEG placement)     #Myoclonic jerks  - on Keppra 500 mg BID, per neuro wishing to wean however per family request will continue at 500 mg BID, but now off valproate per neuro  - Holding home Gabapentin and Memantine in setting of somnolence  - Continue Lexapro     #AMS  - CT/CTA negative for stroke. EEG now off   - 2/8 MRI brain - unremarkable findings. Family now amenable to Trach/PEG.     CARDIOVASCULAR  #HTN  - home amlodipine and metoprolol on hold  - s/p Midodrine 30 q8h, wean to 15mg q8hr (2/19) with holding parameters    - HR noted in 48s on (2/18) from Pulse oxymetry??, but no heart rate pacing, EKG noted w RBBB and HR 91  - Will continue to monitor closely, and consult EP to interrogate PPM   - No issues reported since    #CAD  - Aspirin, Brilinta (on hold), and ranolazine  - Need to restart Brilinta ASAP after Trach/PEG, last stent 2022    #Afib  - pt with known history of pAF, first observed 2/1  - can consider starting AC and/or rate control if persists     RESPIRATORY  #COVID  - s/p paxlovid and 1 dose Remdesivir while inpatient  - s/p Bronch on 2/18 - No active bleeding  - d'dangelo TXA on 2/19    #Pleural effusions b/l  - CT chest from 1/16 showing new small bilateral pleural effusions/ atelectasis/ patchy bilateral ground-glass opacities are consistent with pulmonary edema.  - Diuresis PRN based on amount of pleural effusions on POCUS    #AHRF  - Intubated for airway protection in s/o AMS, now trach on 2/13 -  - Holding brilinta for possible procedure. Will need to restart ASAP after trach/PEG  - s/p zosyn for aspiration PNA from 1/20- 1/28   - MRSA Swab positive for Staph Aureus only, started on Mupirocin (2/13 - )  - Restarted on Zosyn (2/12 - ). BAL culture 2/13 - rare yeast/normal kristina, and no acid fast bacilli  - Hypertonic saline 4 mL Q6H, Duoneb 3 mL q6H    GI  #UGIB  - s/p EGD 1/2/24 showing dieulafoy lesion and esophagitis likely 2/2 NGT irritation s/p 2 clips  - On Protonix 40mg IV BID   - Plan for PEG tube placement with GI today, NPO since MN    #Acute Cholecystitis   - 12/26 Distended gallbladder with cholelithiasis and sludge  - 12/29 HIDA scan positive for Acute Lynsey   - S/p Zosyn (12/24 - 12/31)  - 1/26 s/p percutaneous cholecystostomy tube placement by IR     #Nutrition   - tube feeds NGT   - Discussed w/ family, want PEG.   - Plan for PEG Tube placement today with GI   - Will keep NPO and continue with Maintenance IVF - D5/NS @ 50cc/hr     RENAL  #CKD3  - CTM renal function   - on Cardura   - Passed TOV 2/16    INFECTIOUS DISEASE  #COVID   - s/p Paxlovid and 1 dose Remdesivir    # PNA  - 1/16 CT chest showing ground glass opacities  - s/p zosyn   - intermittent episodes of fevers, likely source GI given cholecystitis culture NGTD  - meropenem 5d course thru (1/28 - 2/1)  - newly hypothermic on 2/11, on Zosyn (2/12 - 2/19), unclear source, work-up unrevealing, plan for 7-day course.  - RVP negative 2/11, Blood cultures 2/11 negative, UA 2/11 negative  - 2/13 Bronch studies NGTD     HEME/ VASCULAR  #Anemia  - CTM CBC daily     #Mesenteric ischemia  - CTA demonstrating mesenteric fat stranding associated with ascending/transverse colon.   - 12/24 s/p SMA angiography with stent placement with diagnostic laparoscopy, small bowel and visible colon viable, some inflammation of omentum in RUQ.  - ASA, Brilinta currently on hold pending PEG eval. Need to resume after procedure.     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       NEUROLOGY  #CVA  - prior CVA x3 with no residual deficits  - c/w Aspirin 81 mg QD     #Myoclonic jerks  - on Keppra 500 mg BID, per neuro wishing to wean however per family request will continue at 500 mg BID, but now off valproate per neuro  - Holding home Gabapentin and Memantine in setting of somnolence  - Continue Lexapro     #AMS  - CT/CTA negative for stroke. EEG now off   - 2/8 MRI brain - unremarkable findings. Family now amenable to Trach/PEG.     CARDIOVASCULAR  #HTN  - home amlodipine and metoprolol on hold  - s/p Midodrine 30 q8h, wean to 15mg q8hr (2/19) with holding parameters    - HR noted in 48s on (2/18) from Pulse oxymetry??, but no heart rate pacing, EKG noted w RBBB and HR 91  - s/p PPM interrogated by EP on 2/20 - only noted w episode of Afib RVR - will continue to monitor closely  - No issues reported since    #CAD  - Aspirin, Brilinta (on hold), and ranolazine  - As per GI, ok to resume Brilinta on 2/23 if no bleeding    #Afib  - pt with known history of pAF, first observed 2/1  - can consider starting AC and/or rate control if persists     RESPIRATORY  #COVID  - s/p paxlovid and 1 dose Remdesivir while inpatient  - s/p Bronch on 2/18 - No active bleeding  - d'dangelo TXA on 2/19    #Pleural effusions b/l  - CT chest from 1/16 showing new small bilateral pleural effusions/ atelectasis/ patchy bilateral ground-glass opacities are consistent with pulmonary edema.  - Diuresis PRN based on amount of pleural effusions on POCUS    #AHRF  - Intubated for airway protection in s/o AMS, now trach on 2/13 -  - Holding brilinta for possible procedure. Will need to restart ASAP after trach/PEG  - s/p zosyn for aspiration PNA from 1/20- 1/28   - MRSA Swab positive for Staph Aureus only, started on Mupirocin (2/13 - )  - Restarted on Zosyn (2/12 - ). BAL culture 2/13 - rare yeast/normal kristina, and no acid fast bacilli  - Hypertonic saline 4 mL Q6H, Duoneb 3 mL q6H    GI  #UGIB  - s/p EGD 1/2/24 showing dieulafoy lesion and esophagitis likely 2/2 NGT irritation s/p 2 clips  - On Protonix 40mg IV BID   - Plan for PEG tube placement with GI today, NPO since MN    #Acute Cholecystitis   - 12/26 Distended gallbladder with cholelithiasis and sludge  - 12/29 HIDA scan positive for Acute Lynsey   - S/p Zosyn (12/24 - 12/31)  - 1/26 s/p percutaneous cholecystostomy tube placement by IR     #Nutrition    - s/p PEG placement by GI on 2/20  - Ok to resume TF today      RENAL  #CKD3  - CTM renal function   - on Cardura   - Passed TOV 2/16    INFECTIOUS DISEASE  #COVID   - s/p Paxlovid and 1 dose Remdesivir    # PNA  - 1/16 CT chest showing ground glass opacities  - s/p zosyn   - intermittent episodes of fevers, likely source GI given cholecystitis culture NGTD  - meropenem 5d course thru (1/28 - 2/1)  - newly hypothermic on 2/11, on Zosyn (2/12 - 2/19), unclear source, work-up unrevealing, plan for 7-day course.  - RVP negative 2/11, Blood cultures 2/11 negative, UA 2/11 negative  - 2/13 Bronch studies NGTD     HEME/ VASCULAR  #Anemia  - CTM CBC daily     #Mesenteric ischemia  - CTA demonstrating mesenteric fat stranding associated with ascending/transverse colon.   - 12/24 s/p SMA angiography with stent placement with diagnostic laparoscopy, small bowel and visible colon viable, some inflammation of omentum in RUQ.  - ASA, Brilinta currently on hold pending PEG eval. Need to resume after procedure.     #DVT PPX  -  Q8H    ENDOCRINE  #DM2  - A1c 9.3   - on Lantus 12U and q6 SSI     LINES/ TUBES  - cholecystostomy tube 1/26     ETHICS/ GOC    - Trach/ PEG s/p placed on 2/20    DISPO: TBD

## 2024-02-21 NOTE — CHART NOTE - NSCHARTNOTEFT_GEN_A_CORE
- s/p successful PEG placement 2/20 with 20 Fr externally removal PEG with external bumper left at 3.5 cm.  - site examined today by fellow- no e/o bleeding, and site appears C/D/I  - bumper loosened today to 4 cm    Recommendations:  -may administer enteral feeds via PEG today   -Nutrition consult for recommendations on enteral feeds  -keep bumper clean and dry, would avoid gauze between the PEG bumper and skin  -maintain aspiration precautions and elevate head of bed during feeds  -close observation to prevent PEG dislodgement, would recommend abdominal binder for protection/prevention of PEG dislodgment  -eventual Speech and Swallow evaluation as an outpatient to evaluate for swallowing; would not be a candidate for PEG removal until at least 6-8 weeks post placement       We will sign off at this time. Please reconsult/page if questions.      Peggy Caceres MD  GI Fellow, PGY-6  Available via Microsoft Teams    NON-URGENT CONSULTS:  Please email [email or web address]  AT NIGHT AND ON WEEKENDS:  Contact on-call GI fellow via answering service ([phone]) from 5pm-8am and on weekends/holidays  MONDAY-FRIDAY 8AM-5PM:  Pager# [phone] ([facility]) or [phone] ([facility]) - s/p successful PEG placement 2/20 with 20 Fr externally removal PEG with external bumper left at 3.5 cm.  - site examined today by fellow- no e/o bleeding, and site appears C/D/I  - bumper loosened today to 4 cm    Recommendations:  -ok to c/w ASA 81 mg daily  -can resume Brilinta 2/23 if no e/o bleeding from PEG site  -may administer enteral feeds via PEG today   -Nutrition consult for recommendations on enteral feeds  -keep bumper clean and dry, would avoid gauze between the PEG bumper and skin  -maintain aspiration precautions and elevate head of bed during feeds  -close observation to prevent PEG dislodgement, would recommend abdominal binder for protection/prevention of PEG dislodgment  -eventual Speech and Swallow evaluation as an outpatient to evaluate for swallowing; would not be a candidate for PEG removal until at least 6-8 weeks post placement       We will sign off at this time. Please reconsult/page if questions.      Peggy Caceres MD  GI Fellow, PGY-6  Available via [employer] Teams    NON-URGENT CONSULTS:  Please email [email or web address]  AT NIGHT AND ON WEEKENDS:  Contact on-call GI fellow via answering service ([phone]) from 5pm-8am and on weekends/holidays  MONDAY-FRIDAY 8AM-5PM:  Pager# [phone] ([facility]) or [phone] ([facility])

## 2024-02-21 NOTE — PROGRESS NOTE ADULT - NS ATTEND AMEND GEN_ALL_CORE FT
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 Course c/b acute cholecystitis s/p perc asa 1/26 by IR, also with recent SMA stent.     Patient failed trial of extubation, also possible sz. Now s/p tracheostomy and PEG. EEG without sz. Currently on Keppra. S/P another course of abx for fevers and hypotension.     PPM interrogation with pAFIB.    #Acute hypoxemic/hypercapnic respiratory failure  #Multifocal Pneumonia  #Dysphagia  #Acute cholecystitis  #Encephalopathy  #Afib  #SMA stent   #Petechial hemorrhages  - Frequent aspiration noted clinically and on CT scans. now reintubated, unable to clear secretions. 2/2 to poor cough and mental status. Now s/p tracheostomy.   - EEG now without s/w keppra 500.. MRI without acute findings.   - S/P course of antibiotics for MSSA pna as well as aspiration. Continue to monitor off abx. Recent culture NTD.   - C/W vent, adjust based on gas, PS as tolerated.  - S/P IR drainage of biliary tube, monitor output. Reconsult IR once acute issues resolve.   - new AFib, seen on interrogation, will f/u with family risk and benefits. Awaiting follow up from vascular to assess need for DAPT with full a/c. D/WI family risk and benefits for a/c for stroke prevention. Will follow up.   - C/W ASA, Brilinta on hold for possible procedure. Per vascular can hold temporarily. Per GI can restart on 2/23.   - Seen by optho, continue to monitor  - Grossly overloaded with pulmonary edema and effusions. Lasix PRN.   - oropharyngeal dysphagia will need peg given repeated aspiration/ GI reconsulted.   - Pending PEG with GI today. NPO for now.   - DVT ppx - SQH  - Dispo- full code.

## 2024-02-21 NOTE — ADVANCED PRACTICE NURSE CONSULT - RECOMMEDATIONS
Topical Recommendations    Tracheostomy: Cleanse around trach site with NS. Pat dry. Apply Silicone foam dressing without border beneath trach collar, cut mid dressing to "Y" shape. Change foam dressing every shift and prn. Change trach ties every 3 days.     Sacrum to BL buttocks: Cleanse with skin cleanser, pat dry. Apply TRIAD paste twice a day and PRN with incontinent episodes. With episodes of incontinence only remove soiled layer of Triad, then reinforce with thin layer.     L 5th lateral metatarsal: Cleanse with NS, pat dry. Paint with betadine, allow to dry. Cover with 4x4 gauze. Re-apply daily and PRN if soiled.     Continue low air loss bed therapy, continue heel elevation, continue to turn & reposition per protocol, soft pillow between bony prominences, continue moisture management with barrier creams & single breathable pad, continue measures to decrease friction/shear/pressure. Continue with nutritional support as per dietary/orders.    Plan of care discussed with daughter at bedside, primary RN Radha and primary ACP Robi.     Please contact Wound Care Service Line if we can be of further assistance (ext [phone]). 
Topical recommendations:     B/L buttocks: Cleanse with skin cleanser, pat dry. Apply Critic-aid moisture barrier cream twice a day and PRN with incontinent episodes.     Nasogastric Tube: Cleanse nare with NS. Pat dry. Apply Liquid barrier film to periwound skin. Apply Stat-lock NGT saleh over center of nare, not touch any skin circumferentially. Change every three days or prn if soiled or compromised.     Endotracheal tube: Inspect vulnerable skin/mucosa every 2 hours with repositioning of tube; provide mouth care with each tube repositioning. Change ETT saleh q3days and prn.     Apply Sween 24 Moisturizer to b/l UE and LE daily, avoiding in between the toes.     Continue low air loss bed therapy, continue heel elevation, continue to turn & reposition as per protocol, soft pillow between bony prominences, continue moisture management with barrier creams & single breathable pad, continue measures to decrease friction/shear/pressure. Continue with nutritional support as per dietary/orders.     Plan of care discussed with Primary RN Chetna and Beata Wahl).    Please contact Wound/Ostomy Care Service Line if we can be of further assistance (ext [phone]).

## 2024-02-21 NOTE — ADVANCED PRACTICE NURSE CONSULT - REASON FOR CONSULT
AccuCath Ace placement for access
Patient seen on skin care rounds after wound care referral received for assessment of skin impairment and recommendations of topical management. As per H&P, patient is a 89M with history of CAD s/p CABG s/p stents (last stent May 2022), s/p PPM, DM2, CKD (baseline Cr 1.2-1.3 as per family), PVD, HTN, HLD, CVA x3 (without residual deficits), and 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 Surgery consulted on 12/24 for abdominal pain and distension. CTA AP obtained which showed  partially occlusive calcified and non-calcified plaque just distal to take-off of the SMA, with estimated at least 75% luminal narrowing. Limited evaluation of its distal branches. Patient taken emergently to OR on 12/24 with general surgery and vascular surgery. Patient s/p diagnostic laparoscopy and SMA stent placement with brachial cutdown. SICU consulted postoperatively for HD monitoring. Course complicated by UGIB requiring 3u pRBCs on 1/1/24. Chart reviewed: WBC 21.23, H/H 8.8/27.4, Platelets 332, INR 1.23, Serum albumin 2.7, A1C 9.3, BMI 25.8, Reinaldo 10. 
Patient seen on skin care rounds after wound care referral received for assessment of skin impairment and recommendations of topical management of MAD/IAD. As per H&P, patient is a 89y male with PMH of CAD s/p CABG s/p stents (last stent May 2022), SMA stent placed 12/23, recent upper GI bleed 12/23, s/p PPM, DM2, CKD (baseline Cr 1.2-1.3 as per family), PVD, HTN, HLD, CVA x3 (without residual deficits), and 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     Patient being consulted by nephrology for MABLE, cardiology for CP, surgery for abdominal pain, vascular for mesenteric ischemia, SICU for HD Monitoring, gastro for hematemesis, internal medicine for medical management, pulmonology for covid-19, rehab medicine for eval for rehab, ENT for rule out mastoiditis, interventional pulmonology for airway clearance, infectious disease for fever, interventional radiology for cholecystostomy tube.    Chart reviewed: WBC: 9.00, H&H: 8.5/28.0, Platelets: 313, INR: 1.05, A1C: 9.3, BMI: 25.8, Reinaldo 14.

## 2024-02-21 NOTE — PROGRESS NOTE ADULT - SUBJECTIVE AND OBJECTIVE BOX
NEPHROLOGY-[facility] [phone]        Patient seen and examined s/p PEG insertion yesterday. NGT out.         MEDICATIONS  (STANDING):  albuterol/ipratropium for Nebulization 3 milliLiter(s) Nebulizer every 6 hours  artificial  tears Solution 1 Drop(s) Left EYE four times a day  aspirin  chewable 81 milliGRAM(s) Enteral Tube daily  chlorhexidine 0.12% Liquid 15 milliLiter(s) Oral Mucosa every 12 hours  chlorhexidine 2% Cloths 1 Application(s) Topical daily  dextrose 5%. 1000 milliLiter(s) (100 mL/Hr) IV Continuous <Continuous>  dextrose 5%. 1000 milliLiter(s) (50 mL/Hr) IV Continuous <Continuous>  dextrose 50% Injectable 25 Gram(s) IV Push once  dextrose 50% Injectable 25 Gram(s) IV Push once  dextrose 50% Injectable 12.5 Gram(s) IV Push once  doxazosin 2 milliGRAM(s) Oral at bedtime  escitalopram 10 milliGRAM(s) Oral daily  glucagon  Injectable 1 milliGRAM(s) IntraMuscular once  insulin glargine Injectable (LANTUS) 12 Unit(s) SubCutaneous <User Schedule>  insulin lispro (ADMELOG) corrective regimen sliding scale   SubCutaneous every 6 hours  levETIRAcetam  IVPB 500 milliGRAM(s) IV Intermittent every 12 hours  pantoprazole  Injectable 40 milliGRAM(s) IV Push every 12 hours  petrolatum Ophthalmic Ointment 1 Application(s) Left EYE at bedtime  sodium chloride 3%  Inhalation 4 milliLiter(s) Inhalation two times a day  sucralfate suspension 1 Gram(s) Enteral Tube two times a day      VITAL:  T(C): , Max: 37.6 (02-20-24 @ 14:00)  T(F): , Max: 99.7 (02-20-24 @ 14:00)  HR: 112 (02-21-24 @ 10:57)  BP: 155/78 (02-21-24 @ 08:00)  BP(mean): --  RR: 16 (02-21-24 @ 08:00)  SpO2: 99% (02-21-24 @ 10:57)  Wt(kg): --    I and O's:    02-20 @ 07:01  -  02-21 @ 07:00  --------------------------------------------------------  IN: 0 mL / OUT: 550 mL / NET: -550 mL          PHYSICAL EXAM:  Constitutional: NAD trach to vent   HEENT: +trach  Respiratory: coarse bs   Cardiovascular: tachy s1s2  Gastrointestinal: BS+, soft, NT/ND +PEG  Extremities:+ peripheral edema  :  + external catheter   Skin: No rashes        LABS:                        8.5    9.00  )-----------( 313      ( 20 Feb 2024 05:00 )             28.0     02-20    141  |  103  |  25<H>  ----------------------------<  152<H>  3.8   |  28  |  1.46<H>    Ca    8.4      20 Feb 2024 05:00  Phos  2.8     02-20  Mg     2.00     02-20            Urine Studies:  Urinalysis Basic - ( 20 Feb 2024 05:00 )    Color: x / Appearance: x / SG: x / pH: x  Gluc: 152 mg/dL / Ketone: x  / Bili: x / Urobili: x   Blood: x / Protein: x / Nitrite: x   Leuk Esterase: x / RBC: x / WBC x   Sq Epi: x / Non Sq Epi: x / Bacteria: x      No labs today     IMPRESSION:  89M w/ DM2, HTN, CVA, PAD, CKD3, and CAD-CABG, 12/23/23 p/w COVID19 infection, c/b respiratory failure/hypotension; now s/p tracheostomy    (1)Renal - CKD3; superimposed mild prerenal azotemia - numbers fluctuating based on hemodynamic status  (2)Lytes - acceptable   (3)CV - now off pressors and midodrine, BP improved   (4)Pulm - vent-dependent   (5ID - afebrile, s/p zosyn   (6)GI - s/p PEG yesterday     RECOMMEND:   (1)Resume TF per GI  (2)Continue abx for GFR 30-40ml/min    family updated at bedside     Wendi [facility] DENISE  [facility]  [phone]          NEPHROLOGY[facility] [phone]        Patient seen and examined s/p PEG insertion yesterday. NGT out.         MEDICATIONS  (STANDING):  albuterol/ipratropium for Nebulization 3 milliLiter(s) Nebulizer every 6 hours  artificial  tears Solution 1 Drop(s) Left EYE four times a day  aspirin  chewable 81 milliGRAM(s) Enteral Tube daily  chlorhexidine 0.12% Liquid 15 milliLiter(s) Oral Mucosa every 12 hours  chlorhexidine 2% Cloths 1 Application(s) Topical daily  dextrose 5%. 1000 milliLiter(s) (100 mL/Hr) IV Continuous <Continuous>  dextrose 5%. 1000 milliLiter(s) (50 mL/Hr) IV Continuous <Continuous>  dextrose 50% Injectable 25 Gram(s) IV Push once  dextrose 50% Injectable 25 Gram(s) IV Push once  dextrose 50% Injectable 12.5 Gram(s) IV Push once  doxazosin 2 milliGRAM(s) Oral at bedtime  escitalopram 10 milliGRAM(s) Oral daily  glucagon  Injectable 1 milliGRAM(s) IntraMuscular once  insulin glargine Injectable (LANTUS) 12 Unit(s) SubCutaneous <User Schedule>  insulin lispro (ADMELOG) corrective regimen sliding scale   SubCutaneous every 6 hours  levETIRAcetam  IVPB 500 milliGRAM(s) IV Intermittent every 12 hours  pantoprazole  Injectable 40 milliGRAM(s) IV Push every 12 hours  petrolatum Ophthalmic Ointment 1 Application(s) Left EYE at bedtime  sodium chloride 3%  Inhalation 4 milliLiter(s) Inhalation two times a day  sucralfate suspension 1 Gram(s) Enteral Tube two times a day      VITAL:  T(C): , Max: 37.6 (02-20-24 @ 14:00)  T(F): , Max: 99.7 (02-20-24 @ 14:00)  HR: 112 (02-21-24 @ 10:57)  BP: 155/78 (02-21-24 @ 08:00)  BP(mean): --  RR: 16 (02-21-24 @ 08:00)  SpO2: 99% (02-21-24 @ 10:57)  Wt(kg): --    I and O's:    02-20 @ 07:01  -  02-21 @ 07:00  --------------------------------------------------------  IN: 0 mL / OUT: 550 mL / NET: -550 mL          PHYSICAL EXAM:  Constitutional: NAD trach to vent   HEENT: +trach  Respiratory: coarse bs   Cardiovascular: tachy s1s2  Gastrointestinal: BS+, soft, NT/ND +PEG  Extremities:+ peripheral edema  :  + external catheter   Skin: No rashes        LABS:                        8.5    9.00  )-----------( 313      ( 20 Feb 2024 05:00 )             28.0     02-20    141  |  103  |  25<H>  ----------------------------<  152<H>  3.8   |  28  |  1.46<H>    Ca    8.4      20 Feb 2024 05:00  Phos  2.8     02-20  Mg     2.00     02-20            Urine Studies:  Urinalysis Basic - ( 20 Feb 2024 05:00 )    Color: x / Appearance: x / SG: x / pH: x  Gluc: 152 mg/dL / Ketone: x  / Bili: x / Urobili: x   Blood: x / Protein: x / Nitrite: x   Leuk Esterase: x / RBC: x / WBC x   Sq Epi: x / Non Sq Epi: x / Bacteria: x      No labs today     IMPRESSION:  89M w/ DM2, HTN, CVA, PAD, CKD3, and CAD-CABG, 12/23/23 p/w COVID19 infection, c/b respiratory failure/hypotension; now s/p tracheostomy    (1)Renal - CKD3; superimposed mild prerenal azotemia - numbers fluctuating based on hemodynamic status  (2)Lytes - acceptable   (3)CV - now off pressors and midodrine, BP improved   (4)Pulm - vent-dependent   (5ID - afebrile, s/p zosyn   (6)GI - s/p PEG yesterday     RECOMMEND:   (1)Resume TF per GI  (2)Continue abx for GFR 30-40ml/min    family updated at bedside     Wendi [facility] DENISE  [facility]  [phone]       RENAL ATTENDING NOTE  Patient seen and examined with NP. Agree with assessment and plan as above.    Davey Chacko MD  [facility]  [phone]

## 2024-02-21 NOTE — ADVANCED PRACTICE NURSE CONSULT - ASSESSMENT
General: A&Ox0-1, tracheostomy with FiO2 @30%, bedbound but able to be turned with 2x assistance, incontinent of urine and stool with [place] condom catheter in place. Skin warm, dry with increased moisture in intertriginous folds, scattered areas of hyperpigmentation and hypopigmentation, scattered areas of ecchymosis on bilateral upper extremities with no hematomas. Blanchable erythema on bilateral heels.     Vascular: Bilateral lower extremities with scattered areas of hyper and hypopigmentation. Dry, flaky skin. Thickened-yellow hyperpigmented overgrown toenails. Increased coolness from midfoot to distal toes. Capillary refill <3 seconds. Unable to palpate DP/PT pulses, with bilateral thready monophasic doppler sounds.    L 5th medial metatarsal head: Arterial wound as evidenced by [id number] wound with 100% purple maroon discoloration. Periwound intact. No induration, no erythema, no crepitus, no fluctuance, no edema, no temperature changes, no overt signs of infection noted. Goals of care: Monitor for tissue type changes, antimicrobial support, prevent from pressure, friction, shearing.    Gluteal cleft: MAD/IAD as evidenced by fissure formation within gluteal cleft, not visible or over burt prominence in natural anatomical laying position. Periwound with maceration circumferentially. No induration, no crepitus, no fluctuance, no suspected candidiasis, no edema, no temperature changes, no overt signs of infection noted.

## 2024-02-21 NOTE — PROGRESS NOTE ADULT - SUBJECTIVE AND OBJECTIVE BOX
CHIEF COMPLAINT: Patient is a 89y old  Male who presents with a chief complaint of COVID19, Chest pain (20 Feb 2024 13:44)      Interval Events:    REVIEW OF SYSTEMS:  [ ] All other systems negative  [ ] Unable to assess ROS because ________    Mode: AC/ CMV (Assist Control/ Continuous Mandatory Ventilation), RR (machine): 12, TV (machine): 450, FiO2: 30, PEEP: 5, ITime: 0.6, MAP: 12, PIP: 34      OBJECTIVE:  ICU Vital Signs Last 24 Hrs  T(C): 36.4 (21 Feb 2024 08:00), Max: 37.6 (20 Feb 2024 14:00)  T(F): 97.6 (21 Feb 2024 08:00), Max: 99.7 (20 Feb 2024 14:00)  HR: 110 (21 Feb 2024 08:00) (72 - 119)  BP: 155/78 (21 Feb 2024 08:00) (127/65 - 155/78)  BP(mean): --  ABP: --  ABP(mean): --  RR: 16 (21 Feb 2024 08:00) (12 - 21)  SpO2: 99% (21 Feb 2024 08:00) (97% - 100%)    O2 Parameters below as of 21 Feb 2024 08:00  Patient On (Oxygen Delivery Method): ventilator          Mode: AC/ CMV (Assist Control/ Continuous Mandatory Ventilation), RR (machine): 12, TV (machine): 450, FiO2: 30, PEEP: 5, ITime: 0.6, MAP: 12, PIP: 34    02-20 @ 07:01  -  02-21 @ 07:00  --------------------------------------------------------  IN: 0 mL / OUT: 550 mL / NET: -550 mL      CAPILLARY BLOOD GLUCOSE      POCT Blood Glucose.: 135 mg/dL (21 Feb 2024 04:58)      HOSPITAL MEDICATIONS:  MEDICATIONS  (STANDING):  albuterol/ipratropium for Nebulization 3 milliLiter(s) Nebulizer every 6 hours  artificial  tears Solution 1 Drop(s) Left EYE four times a day  aspirin  chewable 81 milliGRAM(s) Enteral Tube daily  chlorhexidine 0.12% Liquid 15 milliLiter(s) Oral Mucosa every 12 hours  chlorhexidine 2% Cloths 1 Application(s) Topical daily  dextrose 5% + sodium chloride 0.9%. 1000 milliLiter(s) (50 mL/Hr) IV Continuous <Continuous>  dextrose 5%. 1000 milliLiter(s) (100 mL/Hr) IV Continuous <Continuous>  dextrose 5%. 1000 milliLiter(s) (50 mL/Hr) IV Continuous <Continuous>  dextrose 50% Injectable 25 Gram(s) IV Push once  dextrose 50% Injectable 12.5 Gram(s) IV Push once  dextrose 50% Injectable 25 Gram(s) IV Push once  doxazosin 2 milliGRAM(s) Oral at bedtime  escitalopram 10 milliGRAM(s) Oral daily  glucagon  Injectable 1 milliGRAM(s) IntraMuscular once  insulin glargine Injectable (LANTUS) 12 Unit(s) SubCutaneous <User Schedule>  insulin lispro (ADMELOG) corrective regimen sliding scale   SubCutaneous every 6 hours  levETIRAcetam  IVPB 500 milliGRAM(s) IV Intermittent every 12 hours  midodrine 15 milliGRAM(s) Oral every 8 hours  pantoprazole  Injectable 40 milliGRAM(s) IV Push every 12 hours  petrolatum Ophthalmic Ointment 1 Application(s) Left EYE at bedtime  sodium chloride 3%  Inhalation 4 milliLiter(s) Inhalation two times a day  sucralfate suspension 1 Gram(s) Enteral Tube two times a day    MEDICATIONS  (PRN):  dextrose Oral Gel 15 Gram(s) Oral once PRN Blood Glucose LESS THAN 70 milliGRAM(s)/deciliter      LABS:                        8.5    9.00  )-----------( 313      ( 20 Feb 2024 05:00 )             28.0     02-20    141  |  103  |  25<H>  ----------------------------<  152<H>  3.8   |  28  |  1.46<H>    Ca    8.4      20 Feb 2024 05:00  Phos  2.8     02-20  Mg     2.00     02-20      PT/INR - ( 20 Feb 2024 05:00 )   PT: 11.7 sec;   INR: 1.05 ratio         PTT - ( 20 Feb 2024 05:00 )  PTT:30.4 sec  Urinalysis Basic - ( 20 Feb 2024 05:00 )    Color: x / Appearance: x / SG: x / pH: x  Gluc: 152 mg/dL / Ketone: x  / Bili: x / Urobili: x   Blood: x / Protein: x / Nitrite: x   Leuk Esterase: x / RBC: x / WBC x   Sq Epi: x / Non Sq Epi: x / Bacteria: x            PAST MEDICAL & SURGICAL HISTORY:  Hyperlipemia      Hypertension      Coronary Artery Disease      Diabetes Mellitus Type II      Stented Coronary Artery  total 5 stents, last stent 5/2019      Neuropathy      Myocardial infarction      Stroke  mild left facial numbness   no other residuals verbalized      Myoclonic jerking      Stage 3 chronic kidney disease      History of Cataract Extraction      Hx of CABG      H/O coronary angiogram      S/P coronary artery stent placement  1/6/09      S/P placement of cardiac pacemaker          FAMILY HISTORY:  No pertinent family history in first degree relatives        Social History:  Lives with family  Ambulates with walker  Denies tobacco, EtOH, or illicit substance use (23 Dec 2023 22:51)      RADIOLOGY:  [ ] Reviewed and interpreted by me    PULMONARY FUNCTION TESTS:    EKG: CHIEF COMPLAINT: Patient is a 89y old  Male who presents with a chief complaint of COVID19, Chest pain (20 Feb 2024 13:44)    Interval Events: None reported overnight. s/p New PEG tube placement by GI on 2/20. Ok to resume TF today. Clinically unchanged, VSS, and medications reviewed.     REVIEW OF SYSTEMS:  See above  [x] Unable to assess ROS because Pt is vented     Mode: AC/ CMV (Assist Control/ Continuous Mandatory Ventilation), RR (machine): 12, TV (machine): 450, FiO2: 30, PEEP: 5, ITime: 0.6, MAP: 12, PIP: 34    OBJECTIVE:  ICU Vital Signs Last 24 Hrs  T(C): 36.4 (21 Feb 2024 08:00), Max: 37.6 (20 Feb 2024 14:00)  T(F): 97.6 (21 Feb 2024 08:00), Max: 99.7 (20 Feb 2024 14:00)  HR: 110 (21 Feb 2024 08:00) (72 - 119)  BP: 155/78 (21 Feb 2024 08:00) (127/65 - 155/78)  BP(mean): --  ABP: --  ABP(mean): --  RR: 16 (21 Feb 2024 08:00) (12 - 21)  SpO2: 99% (21 Feb 2024 08:00) (97% - 100%)    O2 Parameters below as of 21 Feb 2024 08:00  Patient On (Oxygen Delivery Method): ventilator    Mode: AC/ CMV (Assist Control/ Continuous Mandatory Ventilation), RR (machine): 12, TV (machine): 450, FiO2: 30, PEEP: 5, ITime: 0.6, MAP: 12, PIP: 34    02-20 @ 07:01  -  02-21 @ 07:00  --------------------------------------------------------  IN: 0 mL / OUT: 550 mL / NET: -550 mL    CAPILLARY BLOOD GLUCOSE    POCT Blood Glucose.: 135 mg/dL (21 Feb 2024 04:58)    HOSPITAL MEDICATIONS:  MEDICATIONS  (STANDING):  albuterol/ipratropium for Nebulization 3 milliLiter(s) Nebulizer every 6 hours  artificial  tears Solution 1 Drop(s) Left EYE four times a day  aspirin  chewable 81 milliGRAM(s) Enteral Tube daily  chlorhexidine 0.12% Liquid 15 milliLiter(s) Oral Mucosa every 12 hours  chlorhexidine 2% Cloths 1 Application(s) Topical daily  dextrose 5% + sodium chloride 0.9%. 1000 milliLiter(s) (50 mL/Hr) IV Continuous <Continuous>  dextrose 5%. 1000 milliLiter(s) (100 mL/Hr) IV Continuous <Continuous>  dextrose 5%. 1000 milliLiter(s) (50 mL/Hr) IV Continuous <Continuous>  dextrose 50% Injectable 25 Gram(s) IV Push once  dextrose 50% Injectable 12.5 Gram(s) IV Push once  dextrose 50% Injectable 25 Gram(s) IV Push once  doxazosin 2 milliGRAM(s) Oral at bedtime  escitalopram 10 milliGRAM(s) Oral daily  glucagon  Injectable 1 milliGRAM(s) IntraMuscular once  insulin glargine Injectable (LANTUS) 12 Unit(s) SubCutaneous <User Schedule>  insulin lispro (ADMELOG) corrective regimen sliding scale   SubCutaneous every 6 hours  levETIRAcetam  IVPB 500 milliGRAM(s) IV Intermittent every 12 hours  midodrine 15 milliGRAM(s) Oral every 8 hours  pantoprazole  Injectable 40 milliGRAM(s) IV Push every 12 hours  petrolatum Ophthalmic Ointment 1 Application(s) Left EYE at bedtime  sodium chloride 3%  Inhalation 4 milliLiter(s) Inhalation two times a day  sucralfate suspension 1 Gram(s) Enteral Tube two times a day    MEDICATIONS  (PRN):  dextrose Oral Gel 15 Gram(s) Oral once PRN Blood Glucose LESS THAN 70 milliGRAM(s)/deciliter    PHYSICAL EXAM:  General: Laying in bed comfortably, NAD   Neck: (+) Trach tube noted, site c/d/i.  HEART: +s1, s2  LUNGS: +coarse breath sounds. No resp distress.   GI: +BS, soft, nt/nd, no peritoneal signs noted, +PEG tube noted, area c/d/i   Extremities: 1-2+ b/l pitting LE edema extending to below knee region.   NEURO: awake, eyes open. PERRL. Nonverbal. Not following commands. Unresponsive.   Skin: as per RN assessment sheet     LABS:                        8.5    9.00  )-----------( 313      ( 20 Feb 2024 05:00 )             28.0     02-20    141  |  103  |  25<H>  ----------------------------<  152<H>  3.8   |  28  |  1.46<H>    Ca    8.4      20 Feb 2024 05:00  Phos  2.8     02-20  Mg     2.00     02-20    PT/INR - ( 20 Feb 2024 05:00 )   PT: 11.7 sec;   INR: 1.05 ratio       PTT - ( 20 Feb 2024 05:00 )  PTT:30.4 sec  Urinalysis Basic - ( 20 Feb 2024 05:00 )    Color: x / Appearance: x / SG: x / pH: x  Gluc: 152 mg/dL / Ketone: x  / Bili: x / Urobili: x   Blood: x / Protein: x / Nitrite: x   Leuk Esterase: x / RBC: x / WBC x   Sq Epi: x / Non Sq Epi: x / Bacteria: x    PAST MEDICAL & SURGICAL HISTORY:  Hyperlipemia    Hypertension    Coronary Artery Disease    Diabetes Mellitus Type II    Stented Coronary Artery  total 5 stents, last stent 5/2019    Neuropathy    Myocardial infarction    Stroke  mild left facial numbness   no other residuals verbalized    Myoclonic jerking    Stage 3 chronic kidney disease    History of Cataract Extraction    Hx of CABG    H/O coronary angiogram    S/P coronary artery stent placement  1/6/09    S/P placement of cardiac pacemaker    FAMILY HISTORY:  No pertinent family history in first degree relatives    Social History:  Lives with family  Ambulates with walker  Denies tobacco, EtOH, or illicit substance use (23 Dec 2023 22:51)    RADIOLOGY:  [ ] Reviewed and interpreted by me    PULMONARY FUNCTION TESTS:    EKG:

## 2024-02-22 LAB
ANION GAP SERPL CALC-SCNC: 11 MMOL/L — SIGNIFICANT CHANGE UP (ref 7–14)
BUN SERPL-MCNC: 20 MG/DL — SIGNIFICANT CHANGE UP (ref 7–23)
CALCIUM SERPL-MCNC: 8.7 MG/DL — SIGNIFICANT CHANGE UP (ref 8.4–10.5)
CHLORIDE SERPL-SCNC: 106 MMOL/L — SIGNIFICANT CHANGE UP (ref 98–107)
CO2 SERPL-SCNC: 28 MMOL/L — SIGNIFICANT CHANGE UP (ref 22–31)
CREAT SERPL-MCNC: 1.47 MG/DL — HIGH (ref 0.5–1.3)
EGFR: 45 ML/MIN/1.73M2 — LOW
GLUCOSE BLDC GLUCOMTR-MCNC: 113 MG/DL — HIGH (ref 70–99)
GLUCOSE BLDC GLUCOMTR-MCNC: 165 MG/DL — HIGH (ref 70–99)
GLUCOSE BLDC GLUCOMTR-MCNC: 187 MG/DL — HIGH (ref 70–99)
GLUCOSE BLDC GLUCOMTR-MCNC: 189 MG/DL — HIGH (ref 70–99)
GLUCOSE SERPL-MCNC: 101 MG/DL — HIGH (ref 70–99)
HCT VFR BLD CALC: 27.4 % — LOW (ref 39–50)
HGB BLD-MCNC: 8.3 G/DL — LOW (ref 13–17)
MAGNESIUM SERPL-MCNC: 2 MG/DL — SIGNIFICANT CHANGE UP (ref 1.6–2.6)
MCHC RBC-ENTMCNC: 28.9 PG — SIGNIFICANT CHANGE UP (ref 27–34)
MCHC RBC-ENTMCNC: 30.3 GM/DL — LOW (ref 32–36)
MCV RBC AUTO: 95.5 FL — SIGNIFICANT CHANGE UP (ref 80–100)
NRBC # BLD: 0 /100 WBCS — SIGNIFICANT CHANGE UP (ref 0–0)
NRBC # FLD: 0 K/UL — SIGNIFICANT CHANGE UP (ref 0–0)
PHOSPHATE SERPL-MCNC: 2.8 MG/DL — SIGNIFICANT CHANGE UP (ref 2.5–4.5)
PLATELET # BLD AUTO: 337 K/UL — SIGNIFICANT CHANGE UP (ref 150–400)
POTASSIUM SERPL-MCNC: 3.3 MMOL/L — LOW (ref 3.5–5.3)
POTASSIUM SERPL-SCNC: 3.3 MMOL/L — LOW (ref 3.5–5.3)
RBC # BLD: 2.87 M/UL — LOW (ref 4.2–5.8)
RBC # FLD: 16.7 % — HIGH (ref 10.3–14.5)
SODIUM SERPL-SCNC: 145 MMOL/L — SIGNIFICANT CHANGE UP (ref 135–145)
WBC # BLD: 7.55 K/UL — SIGNIFICANT CHANGE UP (ref 3.8–10.5)
WBC # FLD AUTO: 7.55 K/UL — SIGNIFICANT CHANGE UP (ref 3.8–10.5)

## 2024-02-22 PROCEDURE — 99233 SBSQ HOSP IP/OBS HIGH 50: CPT

## 2024-02-22 RX ORDER — POTASSIUM CHLORIDE 20 MEQ
40 PACKET (EA) ORAL ONCE
Refills: 0 | Status: COMPLETED | OUTPATIENT
Start: 2024-02-22 | End: 2024-02-22

## 2024-02-22 RX ORDER — METOPROLOL TARTRATE 50 MG
12.5 TABLET ORAL EVERY 12 HOURS
Refills: 0 | Status: DISCONTINUED | OUTPATIENT
Start: 2024-02-22 | End: 2024-02-24

## 2024-02-22 RX ORDER — LEVETIRACETAM 250 MG/1
500 TABLET, FILM COATED ORAL EVERY 12 HOURS
Refills: 0 | Status: DISCONTINUED | OUTPATIENT
Start: 2024-02-22 | End: 2024-02-22

## 2024-02-22 RX ORDER — LEVETIRACETAM 250 MG/1
500 TABLET, FILM COATED ORAL
Refills: 0 | Status: DISCONTINUED | OUTPATIENT
Start: 2024-02-22 | End: 2024-03-12

## 2024-02-22 RX ORDER — PANTOPRAZOLE SODIUM 20 MG/1
40 TABLET, DELAYED RELEASE ORAL EVERY 12 HOURS
Refills: 0 | Status: DISCONTINUED | OUTPATIENT
Start: 2024-02-22 | End: 2024-03-27

## 2024-02-22 RX ORDER — ESCITALOPRAM OXALATE 10 MG/1
10 TABLET, FILM COATED ORAL DAILY
Refills: 0 | Status: DISCONTINUED | OUTPATIENT
Start: 2024-02-23 | End: 2024-03-27

## 2024-02-22 RX ADMIN — SODIUM CHLORIDE 4 MILLILITER(S): 9 INJECTION INTRAMUSCULAR; INTRAVENOUS; SUBCUTANEOUS at 09:56

## 2024-02-22 RX ADMIN — ESCITALOPRAM OXALATE 10 MILLIGRAM(S): 10 TABLET, FILM COATED ORAL at 11:01

## 2024-02-22 RX ADMIN — Medication 3 MILLILITER(S): at 09:57

## 2024-02-22 RX ADMIN — Medication 1 DROP(S): at 23:28

## 2024-02-22 RX ADMIN — Medication 3 MILLILITER(S): at 21:58

## 2024-02-22 RX ADMIN — Medication 2 MILLIGRAM(S): at 21:54

## 2024-02-22 RX ADMIN — Medication 1 GRAM(S): at 17:32

## 2024-02-22 RX ADMIN — Medication 1 APPLICATION(S): at 21:54

## 2024-02-22 RX ADMIN — Medication 1 GRAM(S): at 05:40

## 2024-02-22 RX ADMIN — HEPARIN SODIUM 5000 UNIT(S): 5000 INJECTION INTRAVENOUS; SUBCUTANEOUS at 17:28

## 2024-02-22 RX ADMIN — CHLORHEXIDINE GLUCONATE 1 APPLICATION(S): 213 SOLUTION TOPICAL at 11:14

## 2024-02-22 RX ADMIN — LEVETIRACETAM 400 MILLIGRAM(S): 250 TABLET, FILM COATED ORAL at 05:21

## 2024-02-22 RX ADMIN — PANTOPRAZOLE SODIUM 40 MILLIGRAM(S): 20 TABLET, DELAYED RELEASE ORAL at 17:52

## 2024-02-22 RX ADMIN — Medication 2: at 23:32

## 2024-02-22 RX ADMIN — PANTOPRAZOLE SODIUM 40 MILLIGRAM(S): 20 TABLET, DELAYED RELEASE ORAL at 05:42

## 2024-02-22 RX ADMIN — CHLORHEXIDINE GLUCONATE 15 MILLILITER(S): 213 SOLUTION TOPICAL at 17:31

## 2024-02-22 RX ADMIN — Medication 2: at 17:35

## 2024-02-22 RX ADMIN — Medication 1 DROP(S): at 17:34

## 2024-02-22 RX ADMIN — HEPARIN SODIUM 5000 UNIT(S): 5000 INJECTION INTRAVENOUS; SUBCUTANEOUS at 21:54

## 2024-02-22 RX ADMIN — Medication 1 DROP(S): at 00:10

## 2024-02-22 RX ADMIN — Medication 2: at 12:34

## 2024-02-22 RX ADMIN — Medication 81 MILLIGRAM(S): at 11:01

## 2024-02-22 RX ADMIN — LEVETIRACETAM 500 MILLIGRAM(S): 250 TABLET, FILM COATED ORAL at 17:48

## 2024-02-22 RX ADMIN — Medication 3 MILLILITER(S): at 15:21

## 2024-02-22 RX ADMIN — Medication 1 DROP(S): at 11:03

## 2024-02-22 RX ADMIN — OXYCODONE HYDROCHLORIDE 2.5 MILLIGRAM(S): 5 TABLET ORAL at 20:15

## 2024-02-22 RX ADMIN — CHLORHEXIDINE GLUCONATE 15 MILLILITER(S): 213 SOLUTION TOPICAL at 05:42

## 2024-02-22 RX ADMIN — Medication 1 DROP(S): at 05:36

## 2024-02-22 RX ADMIN — HEPARIN SODIUM 5000 UNIT(S): 5000 INJECTION INTRAVENOUS; SUBCUTANEOUS at 05:46

## 2024-02-22 RX ADMIN — Medication 3 MILLILITER(S): at 03:40

## 2024-02-22 RX ADMIN — Medication 12.5 MILLIGRAM(S): at 17:33

## 2024-02-22 RX ADMIN — OXYCODONE HYDROCHLORIDE 2.5 MILLIGRAM(S): 5 TABLET ORAL at 19:48

## 2024-02-22 RX ADMIN — Medication 40 MILLIEQUIVALENT(S): at 11:00

## 2024-02-22 RX ADMIN — INSULIN GLARGINE 12 UNIT(S): 100 INJECTION, SOLUTION SUBCUTANEOUS at 12:34

## 2024-02-22 NOTE — PROGRESS NOTE ADULT - SUBJECTIVE AND OBJECTIVE BOX
NEPHROLOGY-[facility] [phone]        Patient seen and examined trach to vent, tolerating tube feeds.         MEDICATIONS  (STANDING):  albuterol/ipratropium for Nebulization 3 milliLiter(s) Nebulizer every 6 hours  artificial  tears Solution 1 Drop(s) Left EYE four times a day  aspirin  chewable 81 milliGRAM(s) Enteral Tube daily  chlorhexidine 0.12% Liquid 15 milliLiter(s) Oral Mucosa every 12 hours  chlorhexidine 2% Cloths 1 Application(s) Topical daily  dextrose 5%. 1000 milliLiter(s) (50 mL/Hr) IV Continuous <Continuous>  dextrose 5%. 1000 milliLiter(s) (100 mL/Hr) IV Continuous <Continuous>  dextrose 50% Injectable 25 Gram(s) IV Push once  dextrose 50% Injectable 25 Gram(s) IV Push once  dextrose 50% Injectable 12.5 Gram(s) IV Push once  doxazosin 2 milliGRAM(s) Oral at bedtime  escitalopram 10 milliGRAM(s) Oral daily  glucagon  Injectable 1 milliGRAM(s) IntraMuscular once  heparin   Injectable 5000 Unit(s) SubCutaneous every 8 hours  insulin glargine Injectable (LANTUS) 12 Unit(s) SubCutaneous <User Schedule>  insulin lispro (ADMELOG) corrective regimen sliding scale   SubCutaneous every 6 hours  levETIRAcetam  IVPB 500 milliGRAM(s) IV Intermittent every 12 hours  metoprolol tartrate 12.5 milliGRAM(s) Oral every 12 hours  pantoprazole  Injectable 40 milliGRAM(s) IV Push every 12 hours  petrolatum Ophthalmic Ointment 1 Application(s) Left EYE at bedtime  sucralfate suspension 1 Gram(s) Enteral Tube two times a day      VITAL:  T(C): , Max: 37.3 (02-22-24 @ 08:00)  T(F): , Max: 99.2 (02-22-24 @ 08:00)  HR: 114 (02-22-24 @ 09:57)  BP: 144/74 (02-22-24 @ 08:00)  BP(mean): 93 (02-22-24 @ 08:00)  RR: 16 (02-22-24 @ 08:00)  SpO2: 97% (02-22-24 @ 09:57)  Wt(kg): --    I and O's:    02-21 @ 07:01 - 02-22 @ 07:00  --------------------------------------------------------  IN: 540 mL / OUT: 1070 mL / NET: -530 mL    02-22 @ 07:01  - 02-22 @ 12:30  --------------------------------------------------------  IN: 0 mL / OUT: 50 mL / NET: -50 mL          PHYSICAL EXAM:    PHYSICAL EXAM:  Constitutional: NAD trach to vent   HEENT: +trach  Respiratory: coarse bs   Cardiovascular: tachy s1s2  Gastrointestinal: BS+, soft, NT/ND +PEG  Extremities:+ peripheral edema  :  + external catheter   Skin: No rashes        LABS:                        8.3    7.55  )-----------( 337      ( 22 Feb 2024 06:56 )             27.4     02-22    145  |  106  |  20  ----------------------------<  101<H>  3.3<L>   |  28  |  1.47<H>    Ca    8.7      22 Feb 2024 06:56  Phos  2.8     02-22  Mg     2.00     02-22      IMPRESSION:  89M w/ DM2, HTN, CVA, PAD, CKD3, and CAD-CABG, 12/23/23 p/w COVID19 infection, c/b respiratory failure/hypotension; now s/p tracheostomy    (1)Renal - CKD3; superimposed mild prerenal azotemia - numbers fluctuating based on hemodynamic status  (2)Hypokalemia- mild   (3)CV - now off pressors and midodrine, BP improved   (4)Pulm - vent-dependent   (5ID - afebrile, s/p zosyn   (6)GI - s/p PEG (2/20)    RECOMMEND:   (1)A/w KCl 40 mEq via PEG x 1  (2)Continue abx for GFR 30-40ml/min    family updated at bedside     Wendi Alvarenga NP  [facility]  [phone]                NEPHROLOGY-[facility] [phone]        Patient seen and examined trach to vent, tolerating tube feeds.         MEDICATIONS  (STANDING):  albuterol/ipratropium for Nebulization 3 milliLiter(s) Nebulizer every 6 hours  artificial  tears Solution 1 Drop(s) Left EYE four times a day  aspirin  chewable 81 milliGRAM(s) Enteral Tube daily  chlorhexidine 0.12% Liquid 15 milliLiter(s) Oral Mucosa every 12 hours  chlorhexidine 2% Cloths 1 Application(s) Topical daily  dextrose 5%. 1000 milliLiter(s) (50 mL/Hr) IV Continuous <Continuous>  dextrose 5%. 1000 milliLiter(s) (100 mL/Hr) IV Continuous <Continuous>  dextrose 50% Injectable 25 Gram(s) IV Push once  dextrose 50% Injectable 25 Gram(s) IV Push once  dextrose 50% Injectable 12.5 Gram(s) IV Push once  doxazosin 2 milliGRAM(s) Oral at bedtime  escitalopram 10 milliGRAM(s) Oral daily  glucagon  Injectable 1 milliGRAM(s) IntraMuscular once  heparin   Injectable 5000 Unit(s) SubCutaneous every 8 hours  insulin glargine Injectable (LANTUS) 12 Unit(s) SubCutaneous <User Schedule>  insulin lispro (ADMELOG) corrective regimen sliding scale   SubCutaneous every 6 hours  levETIRAcetam  IVPB 500 milliGRAM(s) IV Intermittent every 12 hours  metoprolol tartrate 12.5 milliGRAM(s) Oral every 12 hours  pantoprazole  Injectable 40 milliGRAM(s) IV Push every 12 hours  petrolatum Ophthalmic Ointment 1 Application(s) Left EYE at bedtime  sucralfate suspension 1 Gram(s) Enteral Tube two times a day      VITAL:  T(C): , Max: 37.3 (02-22-24 @ 08:00)  T(F): , Max: 99.2 (02-22-24 @ 08:00)  HR: 114 (02-22-24 @ 09:57)  BP: 144/74 (02-22-24 @ 08:00)  BP(mean): 93 (02-22-24 @ 08:00)  RR: 16 (02-22-24 @ 08:00)  SpO2: 97% (02-22-24 @ 09:57)  Wt(kg): --    I and O's:    02-21 @ 07:01 - 02-22 @ 07:00  --------------------------------------------------------  IN: 540 mL / OUT: 1070 mL / NET: -530 mL    02-22 @ 07:01  - 02-22 @ 12:30  --------------------------------------------------------  IN: 0 mL / OUT: 50 mL / NET: -50 mL          PHYSICAL EXAM:    PHYSICAL EXAM:  Constitutional: NAD trach to vent   HEENT: +trach  Respiratory: coarse bs   Cardiovascular: tachy s1s2  Gastrointestinal: BS+, soft, NT/ND +PEG  Extremities:+ peripheral edema  :  + external catheter   Skin: No rashes        LABS:                        8.3    7.55  )-----------( 337      ( 22 Feb 2024 06:56 )             27.4     02-22    145  |  106  |  20  ----------------------------<  101<H>  3.3<L>   |  28  |  1.47<H>    Ca    8.7      22 Feb 2024 06:56  Phos  2.8     02-22  Mg     2.00     02-22      IMPRESSION:  89M w/ DM2, HTN, CVA, PAD, CKD3, and CAD-CABG, 12/23/23 p/w COVID19 infection, c/b respiratory failure/hypotension; now s/p tracheostomy    (1)Renal - CKD3; superimposed mild prerenal azotemia - numbers fluctuating based on hemodynamic status  (2)Hypokalemia- mild   (3)CV - now off pressors and midodrine, BP improved   (4)Pulm - vent-dependent   (5ID - afebrile, s/p zosyn   (6)GI - s/p PEG (2/20)    RECOMMEND:   (1)A/w KCl 40 mEq via PEG x 1  (2)Continue abx for GFR 30-40ml/min    family updated at bedside     [facility] NP  [facility]  [phone]     RENAL ATTENDING NOTE  Patient seen and examined with NP. Agree with assessment and plan as above.    Davey Chacko MD  [facility]  [phone]

## 2024-02-22 NOTE — PROGRESS NOTE ADULT - NS ATTEND AMEND GEN_ALL_CORE FT
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 Course c/b acute cholecystitis s/p perc asa 1/26 by IR, also with recent SMA stent.     Patient failed trial of extubation, also possible sz. Now s/p tracheostomy and PEG. EEG without sz. Currently on Keppra. S/P another course of abx for fevers and hypotension.     PPM interrogation with pAFIB.    #Acute hypoxemic/hypercapnic respiratory failure  #Multifocal Pneumonia  #Dysphagia  #Acute cholecystitis  #Encephalopathy  #Afib  #SMA stent   #Petechial hemorrhages  - Frequent aspiration noted clinically and on CT scans. now reintubated, unable to clear secretions. 2/2 to poor cough and mental status. Now s/p tracheostomy.   - EEG now without s/w keppra 500.. MRI without acute findings.   - S/P course of antibiotics for MSSA pna as well as aspiration. Continue to monitor off abx.  - C/W vent, adjust based on gas, PS as tolerated.  - S/P IR drainage of biliary tube, monitor output. Reconsult IR once acute issues resolve.   - new AFib, seen on interrogation, will f/u with family risk and benefits. Per vascular would want to c/w DAPT for stent. D/W family. Will think about a/c after restarting Brilinta  - C/W ASA, Brilinta on hold for PEG. Per vascular can hold temporarily. Per GI can restart on 2/23.   - Seen by optho, continue to monitor  - . Lasix PRN.   - S/P PEG now on tube feeds.   - DVT ppx - SQH  - Dispo- full code.

## 2024-02-22 NOTE — CHART NOTE - NSCHARTNOTEFT_GEN_A_CORE
Source: Patient A&Ox [0]    Family [x ] Son    other [x ]RN/Chart Review     Medical Course: WALTER DONOHUE is a 89y male with PMH of CAD s/p CABG s/p stents (last stent May 2022), SMA stent placed 12/23, recent upper GI bleed 12/23, s/p PPM, DM2, CKD (baseline Cr 1.2-1.3 as per family), PVD, HTN, HLD, CVA x3 (without residual deficits), and 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     Nutrition Course: Patient is being follow up for severe malnutrition. Pt noted s/p PEG placement on 2/20. At the time of visit, TF Glucerna 1.2Cal visibly running at 65ml/hr. Pt noted with diet of Glucerna 1.5 Prince 45ml/hr x 24hrs total volume [id number] in EMR. RN made aware the correct diet order. Current diet order if running at goal rate 45ml/hr will provide total 1620kcal, 89gm pro, and 820ml of free water. Recommend increase TF regimen to 50ml/hr to provide total volume of 1200ml, 1800kcal, 910.8ml of free water/day. Pt continues noted with abdominal distention, as per Son pt is tolerating formula so far.  As per RN, pt is not getting any free water flushes via PEG.  Recommend consider add water flushes to prevent PEG dislodge and provide hydration. RD to remain available for further nutritional interventions as indicated.        Diet, NPO with Tube Feed:   Tube Feeding Modality: Gastrostomy  Glucerna 1.5 Prince ([id number])  Total Volume for 24 Hours (mL): 1080  Continuous  Starting Tube Feed Rate {mL per Hour}: 10  Increase Tube Feed Rate by (mL): 10     Every 6 hours  Until Goal Tube Feed Rate (mL per Hour): 45  Tube Feed Duration (in Hours): 24  Tube Feed Start Time: 12:00 (02-21-24 @ 15:45)      GI: WDL. Last BM 2/20 per RN Flow sheet       Anthropometrics: Height (cm): 175.3 (01-19)  Weight (kg): 83(2-18), 79.45(2-16), 78.6(2-14), 79.3 (01-19)  Pt's weight noted with fluctuation likely due to edema/fluid loss.  BMI (kg/m2): 25.8 (01-19)    Edema: 1+ left ankle and 2+ right ankle   Pressure Injuries: None reported at present.     __________________ Pertinent Medications__________________   MEDICATIONS  (STANDING):  albuterol/ipratropium for Nebulization 3 milliLiter(s) Nebulizer every 6 hours  artificial  tears Solution 1 Drop(s) Left EYE four times a day  aspirin  chewable 81 milliGRAM(s) Enteral Tube daily  chlorhexidine 0.12% Liquid 15 milliLiter(s) Oral Mucosa every 12 hours  chlorhexidine 2% Cloths 1 Application(s) Topical daily  dextrose 5%. 1000 milliLiter(s) (50 mL/Hr) IV Continuous <Continuous>  dextrose 5%. 1000 milliLiter(s) (100 mL/Hr) IV Continuous <Continuous>  dextrose 50% Injectable 25 Gram(s) IV Push once  dextrose 50% Injectable 12.5 Gram(s) IV Push once  dextrose 50% Injectable 25 Gram(s) IV Push once  doxazosin 2 milliGRAM(s) Oral at bedtime  glucagon  Injectable 1 milliGRAM(s) IntraMuscular once  heparin   Injectable 5000 Unit(s) SubCutaneous every 8 hours  insulin glargine Injectable (LANTUS) 12 Unit(s) SubCutaneous <User Schedule>  insulin lispro (ADMELOG) corrective regimen sliding scale   SubCutaneous every 6 hours  levETIRAcetam  Solution 500 milliGRAM(s) Oral two times a day  metoprolol tartrate 12.5 milliGRAM(s) Oral every 12 hours  pantoprazole   Suspension 40 milliGRAM(s) Oral every 12 hours  petrolatum Ophthalmic Ointment 1 Application(s) Left EYE at bedtime  sucralfate suspension 1 Gram(s) Enteral Tube two times a day    MEDICATIONS  (PRN):  dextrose Oral Gel 15 Gram(s) Oral once PRN Blood Glucose LESS THAN 70 milliGRAM(s)/deciliter  oxyCODONE    IR 2.5 milliGRAM(s) Oral every 4 hours PRN Moderate to severe pain      __________________ Pertinent Labs__________________   02-22 Na145 mmol/L Glu 101 mg/dL<H> K+ 3.3 mmol/L<L> Cr  1.47 mg/dL<H> BUN 20 mg/dL 02-22 Phos 2.8 mg/dL 02-16 Alb 2.2 g/dL<L>        POCT Blood Glucose.: 165 mg/dL (02-22-24 @ 11:36)  POCT Blood Glucose.: 113 mg/dL (02-22-24 @ 05:23)  POCT Blood Glucose.: 127 mg/dL (02-21-24 @ 23:15)  POCT Blood Glucose.: 141 mg/dL (02-21-24 @ 16:48)  POCT Blood Glucose.: 135 mg/dL (02-21-24 @ 11:19)  POCT Blood Glucose.: 135 mg/dL (02-21-24 @ 04:58)  POCT Blood Glucose.: 185 mg/dL (02-20-24 @ 23:35)  POCT Blood Glucose.: 156 mg/dL (02-20-24 @ 18:40)  POCT Blood Glucose.: 178 mg/dL (02-20-24 @ 17:48)  POCT Blood Glucose.: 176 mg/dL (02-20-24 @ 16:47)  POCT Blood Glucose.: 155 mg/dL (02-20-24 @ 12:30)  POCT Blood Glucose.: 156 mg/dL (02-20-24 @ 05:40)  POCT Blood Glucose.: 197 mg/dL (02-19-24 @ 23:39)  POCT Blood Glucose.: 221 mg/dL (02-19-24 @ 17:32)          Estimated Needs:   [x ] IBW: 72kg   Calorie needs: 25-30kcal/kg--1767- 2121kcal   Protein needs: 1.0-1.2 gm pro,70.7- 84.84gm pro.       Previous Nutrition Diagnosis: Severe protein calorie malnutrition     Nutrition Diagnosis is [x ] ongoing       Recommendations:  1) Recommend consider increase TF regimen to 50ml/hr to provide total volume of 1200ml, 1800kcal, 910.8ml of free water/day.   2) Recommend add free water flush as per MD.   3) Monitor TF tolerance, Labs, weights, BMs, and skin integrity.    4) RD to remain available for further nutritional interventions as indicated.       Monitoring and Evaluation:      [ x] Tolerance to diet prescription [x ] weights [x ] follow up per protocol  [ ] other:

## 2024-02-22 NOTE — PROGRESS NOTE ADULT - SUBJECTIVE AND OBJECTIVE BOX
CHIEF COMPLAINT: Patient is a 89y old  Male who presents with a chief complaint of COVID19, Chest pain (21 Feb 2024 13:34)      INTERVAL EVENTS:    REVIEW OF SYSTEMS: Seen by bedside during AM rounds and     Mode: AC/ CMV (Assist Control/ Continuous Mandatory Ventilation), RR (machine): 12, TV (machine): 450, FiO2: 30, PEEP: 5, ITime: 0.6, MAP: 11, PIP: 39      OBJECTIVE:  ICU Vital Signs Last 24 Hrs  T(C): 37.3 (22 Feb 2024 08:00), Max: 37.3 (22 Feb 2024 08:00)  T(F): 99.2 (22 Feb 2024 08:00), Max: 99.2 (22 Feb 2024 08:00)  HR: 114 (22 Feb 2024 09:57) (93 - 117)  BP: 144/74 (22 Feb 2024 08:00) (123/47 - 145/77)  BP(mean): 93 (22 Feb 2024 08:00) (70 - 93)  ABP: --  ABP(mean): --  RR: 16 (22 Feb 2024 08:00) (13 - 18)  SpO2: 97% (22 Feb 2024 09:57) (96% - 100%)    O2 Parameters below as of 22 Feb 2024 08:00  Patient On (Oxygen Delivery Method): ventilator    O2 Concentration (%): 30      Mode: AC/ CMV (Assist Control/ Continuous Mandatory Ventilation), RR (machine): 12, TV (machine): 450, FiO2: 30, PEEP: 5, ITime: 0.6, MAP: 11, PIP: 39    02-21 @ 07:01 - 02-22 @ 07:00  --------------------------------------------------------  IN: 540 mL / OUT: 1070 mL / NET: -530 mL    02-22 @ 07:01  -  02-22 @ 10:12  --------------------------------------------------------  IN: 0 mL / OUT: 50 mL / NET: -50 mL      CAPILLARY BLOOD GLUCOSE      POCT Blood Glucose.: 113 mg/dL (22 Feb 2024 05:23)      HOSPITAL MEDICATIONS:  MEDICATIONS  (STANDING):  albuterol/ipratropium for Nebulization 3 milliLiter(s) Nebulizer every 6 hours  artificial  tears Solution 1 Drop(s) Left EYE four times a day  aspirin  chewable 81 milliGRAM(s) Enteral Tube daily  chlorhexidine 0.12% Liquid 15 milliLiter(s) Oral Mucosa every 12 hours  chlorhexidine 2% Cloths 1 Application(s) Topical daily  dextrose 5%. 1000 milliLiter(s) (100 mL/Hr) IV Continuous <Continuous>  dextrose 5%. 1000 milliLiter(s) (50 mL/Hr) IV Continuous <Continuous>  dextrose 50% Injectable 25 Gram(s) IV Push once  dextrose 50% Injectable 25 Gram(s) IV Push once  dextrose 50% Injectable 12.5 Gram(s) IV Push once  doxazosin 2 milliGRAM(s) Oral at bedtime  escitalopram 10 milliGRAM(s) Oral daily  glucagon  Injectable 1 milliGRAM(s) IntraMuscular once  heparin   Injectable 5000 Unit(s) SubCutaneous every 8 hours  insulin glargine Injectable (LANTUS) 12 Unit(s) SubCutaneous <User Schedule>  insulin lispro (ADMELOG) corrective regimen sliding scale   SubCutaneous every 6 hours  levETIRAcetam  IVPB 500 milliGRAM(s) IV Intermittent every 12 hours  pantoprazole  Injectable 40 milliGRAM(s) IV Push every 12 hours  petrolatum Ophthalmic Ointment 1 Application(s) Left EYE at bedtime  potassium chloride   Powder 40 milliEquivalent(s) Oral once  sucralfate suspension 1 Gram(s) Enteral Tube two times a day    MEDICATIONS  (PRN):  dextrose Oral Gel 15 Gram(s) Oral once PRN Blood Glucose LESS THAN 70 milliGRAM(s)/deciliter  oxyCODONE    IR 2.5 milliGRAM(s) Oral every 4 hours PRN Moderate to severe pain      PHYSICAL EXAMINATION    LABS:                        8.3    7.55  )-----------( 337      ( 22 Feb 2024 06:56 )             27.4     02-22    145  |  106  |  20  ----------------------------<  101<H>  3.3<L>   |  28  |  1.47<H>    Ca    8.7      22 Feb 2024 06:56  Phos  2.8     02-22  Mg     2.00     02-22        Urinalysis Basic - ( 22 Feb 2024 06:56 )    Color: x / Appearance: x / SG: x / pH: x  Gluc: 101 mg/dL / Ketone: x  / Bili: x / Urobili: x   Blood: x / Protein: x / Nitrite: x   Leuk Esterase: x / RBC: x / WBC x   Sq Epi: x / Non Sq Epi: x / Bacteria: x            PAST MEDICAL & SURGICAL HISTORY:  Hyperlipemia      Hypertension      Coronary Artery Disease      Diabetes Mellitus Type II      Stented Coronary Artery  total 5 stents, last stent 5/2019      Neuropathy      Myocardial infarction      Stroke  mild left facial numbness   no other residuals verbalized      Myoclonic jerking      Stage 3 chronic kidney disease      History of Cataract Extraction      Hx of CABG      H/O coronary angiogram      S/P coronary artery stent placement  1/6/09      S/P placement of cardiac pacemaker          FAMILY HISTORY:  No pertinent family history in first degree relatives        Social History:  Lives with family  Ambulates with walker  Denies tobacco, EtOH, or illicit substance use (23 Dec 2023 22:51)      RADIOLOGY:  [ ] Reviewed and interpreted by me    PULMONARY FUNCTION TESTS:    EKG: CHIEF COMPLAINT: Patient is a 89y old  Male who presents with a chief complaint of COVID19, Chest pain (21 Feb 2024 13:34)      INTERVAL EVENTS:  - AFIB 115-120s overnight and lopressor resumed today  - Tolerated some PS trials today   - Holding AC given recent bleed hx   - Holding HTS given hemoptysis now resolved     REVIEW OF SYSTEMS: Seen by bedside during AM rounds and unable to assess ROS as vented     Mode: AC/ CMV (Assist Control/ Continuous Mandatory Ventilation), RR (machine): 12, TV (machine): 450, FiO2: 30, PEEP: 5, ITime: 0.6, MAP: 11, PIP: 39      OBJECTIVE:  ICU Vital Signs Last 24 Hrs  T(C): 37.3 (22 Feb 2024 08:00), Max: 37.3 (22 Feb 2024 08:00)  T(F): 99.2 (22 Feb 2024 08:00), Max: 99.2 (22 Feb 2024 08:00)  HR: 114 (22 Feb 2024 09:57) (93 - 117)  BP: 144/74 (22 Feb 2024 08:00) (123/47 - 145/77)  BP(mean): 93 (22 Feb 2024 08:00) (70 - 93)  ABP: --  ABP(mean): --  RR: 16 (22 Feb 2024 08:00) (13 - 18)  SpO2: 97% (22 Feb 2024 09:57) (96% - 100%)    O2 Parameters below as of 22 Feb 2024 08:00  Patient On (Oxygen Delivery Method): ventilator    O2 Concentration (%): 30      Mode: AC/ CMV (Assist Control/ Continuous Mandatory Ventilation), RR (machine): 12, TV (machine): 450, FiO2: 30, PEEP: 5, ITime: 0.6, MAP: 11, PIP: 39    02-21 @ 07:01  -  02-22 @ 07:00  --------------------------------------------------------  IN: 540 mL / OUT: 1070 mL / NET: -530 mL    02-22 @ 07:01 - 02-22 @ 10:12  --------------------------------------------------------  IN: 0 mL / OUT: 50 mL / NET: -50 mL      CAPILLARY BLOOD GLUCOSE  POCT Blood Glucose.: 113 mg/dL (22 Feb 2024 05:23)      HOSPITAL MEDICATIONS:  MEDICATIONS  (STANDING):  albuterol/ipratropium for Nebulization 3 milliLiter(s) Nebulizer every 6 hours  artificial  tears Solution 1 Drop(s) Left EYE four times a day  aspirin  chewable 81 milliGRAM(s) Enteral Tube daily  chlorhexidine 0.12% Liquid 15 milliLiter(s) Oral Mucosa every 12 hours  chlorhexidine 2% Cloths 1 Application(s) Topical daily  dextrose 5%. 1000 milliLiter(s) (100 mL/Hr) IV Continuous <Continuous>  dextrose 5%. 1000 milliLiter(s) (50 mL/Hr) IV Continuous <Continuous>  dextrose 50% Injectable 25 Gram(s) IV Push once  dextrose 50% Injectable 25 Gram(s) IV Push once  dextrose 50% Injectable 12.5 Gram(s) IV Push once  doxazosin 2 milliGRAM(s) Oral at bedtime  escitalopram 10 milliGRAM(s) Oral daily  glucagon  Injectable 1 milliGRAM(s) IntraMuscular once  heparin   Injectable 5000 Unit(s) SubCutaneous every 8 hours  insulin glargine Injectable (LANTUS) 12 Unit(s) SubCutaneous <User Schedule>  insulin lispro (ADMELOG) corrective regimen sliding scale   SubCutaneous every 6 hours  levETIRAcetam  IVPB 500 milliGRAM(s) IV Intermittent every 12 hours  pantoprazole  Injectable 40 milliGRAM(s) IV Push every 12 hours  petrolatum Ophthalmic Ointment 1 Application(s) Left EYE at bedtime  potassium chloride   Powder 40 milliEquivalent(s) Oral once  sucralfate suspension 1 Gram(s) Enteral Tube two times a day    MEDICATIONS  (PRN):  dextrose Oral Gel 15 Gram(s) Oral once PRN Blood Glucose LESS THAN 70 milliGRAM(s)/deciliter  oxyCODONE    IR 2.5 milliGRAM(s) Oral every 4 hours PRN Moderate to severe pain      PHYSICAL EXAMINATION  General: NAD   HEENT: Trach present   Cards: S1/S2, no murmurs   Pulm: Course vent sounds bilaterally. No wheezes.   Abdomen: Soft, nondistended and nontender. BS (+) PEG present   Extremities: No pedal edema. GEORGE of BL upper > lower extremities with some weakness.   Neurology: Awake and alert, tracks, however does not follow commands likely second to language barrier, with no acute focal neurological deficits     LABS:                        8.3    7.55  )-----------( 337      ( 22 Feb 2024 06:56 )             27.4     02-22    145  |  106  |  20  ----------------------------<  101<H>  3.3<L>   |  28  |  1.47<H>    Ca    8.7      22 Feb 2024 06:56  Phos  2.8     02-22  Mg     2.00     02-22        Urinalysis Basic - ( 22 Feb 2024 06:56 )  Color: x / Appearance: x / SG: x / pH: x  Gluc: 101 mg/dL / Ketone: x  / Bili: x / Urobili: x   Blood: x / Protein: x / Nitrite: x   Leuk Esterase: x / RBC: x / WBC x   Sq Epi: x / Non Sq Epi: x / Bacteria: x            PAST MEDICAL & SURGICAL HISTORY:  Hyperlipemia      Hypertension      Coronary Artery Disease      Diabetes Mellitus Type II      Stented Coronary Artery  total 5 stents, last stent 5/2019      Neuropathy      Myocardial infarction      Stroke  mild left facial numbness   no other residuals verbalized      Myoclonic jerking      Stage 3 chronic kidney disease      History of Cataract Extraction      Hx of CABG      H/O coronary angiogram      S/P coronary artery stent placement  1/6/09      S/P placement of cardiac pacemaker          FAMILY HISTORY:  No pertinent family history in first degree relatives        Social History:  Lives with family  Ambulates with walker  Denies tobacco, EtOH, or illicit substance use (23 Dec 2023 22:51)      RADIOLOGY:  [ ] Reviewed and interpreted by me    PULMONARY FUNCTION TESTS:    EKG:

## 2024-02-22 NOTE — PROGRESS NOTE ADULT - ASSESSMENT
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 Course ultimately complicated by progressive WOB and O2 demand and patient intubated and transferred to MICU (1/22). Course further complicated by acute cholecystits and s/p Perc Lynsey with IR on (1/26). Extubation failed and prolonged vent time noted s/p trach (2/13). 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 Course ultimately complicated by progressive WOB and O2 demand and patient intubated and transferred to MICU (1/22). Course further complicated by acute cholecystits and s/p Perc Lynsey with IR on (1/26), HFrEF 38, pulmonary edema, superimposed PNA, failed extubation failed and prolonged vent time and s/p trach (2/13). Patient transferred to RCU (2/16) and s/p PEG (2/20)     NEUROLOGY   # Encephalopathy   - Hx of CVA with no residual deficits per family, however per family worsening confusion at home over the past 6 months (becoming disoriented to time) but prior to admissoin has been more confused, talking about seeing people that are not present.  - CTH (1/31) with no evidence of acute infarct  - Continue on ASA and brilinta  - Holding home Gabapentin and Memantine in setting of somnolence    # ICA stenosis   - CTA NECK (1/31) with moderate to severe narrowing left internal carotid at the origin. Severe narrowingright internal carotid by a tandem lesion 1.5 cm above the bifurcation with a narrowed internal carotid beyond the lesion. Extensive calcified plaque both cavernous and supraclinoid carotid arteries with narrowing but no occlusion. Mild narrowing proximal right M1 segment. Moderate narrowing both vertebral V4 segments. No perfusion abnormality at the Tmax 6 second threshold, but moderate hypoperfusion in the right MCA territory at the 4 second threshold.   - Continue on ASA and brilinta    # Myoclonic jerks   - Hx of myoclonic jerks post CVAs   - EEG (1/18-2/6) negative for seizures  - Continue on keppra and per neuro wishes to wean, however family wishes to keep current dose as home medication.     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   - Continue on lopressor 12.5mg BID in lieu of home Toprol 25   - AC discussed at length however with recent GIB will hold for now     # HFrEF 38 likely stress induced?   # Mild to moderate AS   - ECHO (12/2023) with EF 62 with normal LVSF and mild to moderate AS   - ECHO (2/2024) with EF 38, moderate LVSD with global LV hypokinesis, mildly reduced RVSF (TAPSE 1.3), mild to moderate MR, mild AR, and moderate AS.   - Continue on lopressor as above and discuss GDMT as BP would tolerate.   - Given AS BP control as able.   - Discuss intermittently diuresis    # CAD with CABG   - CATH (5/2022) with dLAD 70, SVG graft to OM1 with 90 in stent stenosis s/p PCI and GEMMA placement, and LIMA graft to mLAD with no disease.   - Continue on ASA and brilinta    RESPIRATORY   # Acute respiratory failure second to SARS-COV-2 vs pulm edema vs PNA  - Presented with COVID complicated by sepsis second to acute lynsey and worsening respiratory failure second to pulmonary edema requiring intubation.   - Prolonged intubaiton and s/p tracheostomy (2/13)   - CT CHEST 1/16 with new small bilateral pleural effusions/ atelectasis/ patchy bilateral ground-glass opacities are consistent with pulmonary edema.  - Completed ABX and COVID regimen as below   - Continue on vent and SBT 15/5 as tolerated.   - Continue on nebs and hold further HTS given intermittent hemoptysis  - Continue on diuresis as above    # Mild hemoptysis likely from suction trauma   - s/p bronch (2/18) with no active bleed  - Hemoptysis improved with TXA and holding further HTS as above   - Careful with suctioning     GI  # Nutrition/ Dysphagia   - PEG placed by GI on 2/20 and tube feeds continued.   - Monitor tolerance and BM regimen as needed.     # UGIB   - EGD (1/2) with esophagitis and bleeding Dieulafoy's lesion s/p clips.   - Continue on PPI and carafate     # SMA calcification   - CTA demonstrating mesenteric fat stranding associated with ascending/transverse colon  - Diagnostic laparoscopy (12/24) with small bowel and visible colon viable, some inflammation of omentum in RUQ  - SMA Angiogram and stent placed (12/25).   - Continue on ASA and brilinta   - Brilinta held briefly given hemoptysis as above, however challenged with DVT PPX and if remains stable will resume on 2/23    # Acute Cholecystits   - CT (12/26) with distended GB with cholelithiasis and sludge   - HIDA (12/29) POSITIVE for acute cholecystitis   - Case discussed with IR at length and s/p PERC LYNSEY (1/26)   - Completed zosyn course (12/24-12/31)     / RENAL   # CKD   - CRE at baseline   - Monitor renal function and UOP     INFECTIOUS DISEASE   # COVID with superimposed PNA   - COVID POSITIVE (12/23) and completed remdesivir x 1 dose and paxlovid course.   - WOB worsened as above requiring intubation and cultures negative. Zosyn completed empirically however hypothermic (2/11) and BCx, UA, RVP and BAL negative.   - Completed additional zosyn (2/12-2/19) empirically   - Monitor oFF ABX     HEME  # AOCD   - Monitor HH   - DVT PPX with HSQ     ENDOCRINE   # DM2 A1C 9.3   - Continue on lantus 12 and ISS     SKIN/ TUBES   - Trach (2/13)   - PEG (2/20)   - PERC LYNSEY (1/26 - )     ETHICS   - FULL CODE     DISPO - TBD

## 2024-02-23 LAB
ANION GAP SERPL CALC-SCNC: 10 MMOL/L — SIGNIFICANT CHANGE UP (ref 7–14)
BASE EXCESS BLDV CALC-SCNC: 6.2 MMOL/L — HIGH (ref -2–3)
BLOOD GAS VENOUS COMPREHENSIVE RESULT: SIGNIFICANT CHANGE UP
BUN SERPL-MCNC: 22 MG/DL — SIGNIFICANT CHANGE UP (ref 7–23)
CALCIUM SERPL-MCNC: 8.6 MG/DL — SIGNIFICANT CHANGE UP (ref 8.4–10.5)
CHLORIDE BLDV-SCNC: 111 MMOL/L — HIGH (ref 96–108)
CHLORIDE SERPL-SCNC: 108 MMOL/L — HIGH (ref 98–107)
CO2 BLDV-SCNC: 31.9 MMOL/L — HIGH (ref 22–26)
CO2 SERPL-SCNC: 27 MMOL/L — SIGNIFICANT CHANGE UP (ref 22–31)
CREAT SERPL-MCNC: 1.51 MG/DL — HIGH (ref 0.5–1.3)
EGFR: 44 ML/MIN/1.73M2 — LOW
GAS PNL BLDV: 143 MMOL/L — SIGNIFICANT CHANGE UP (ref 136–145)
GLUCOSE BLDC GLUCOMTR-MCNC: 198 MG/DL — HIGH (ref 70–99)
GLUCOSE BLDC GLUCOMTR-MCNC: 236 MG/DL — HIGH (ref 70–99)
GLUCOSE BLDC GLUCOMTR-MCNC: 241 MG/DL — HIGH (ref 70–99)
GLUCOSE BLDC GLUCOMTR-MCNC: 81 MG/DL — SIGNIFICANT CHANGE UP (ref 70–99)
GLUCOSE BLDV-MCNC: 261 MG/DL — HIGH (ref 70–99)
GLUCOSE SERPL-MCNC: 195 MG/DL — HIGH (ref 70–99)
HCO3 BLDV-SCNC: 31 MMOL/L — HIGH (ref 22–29)
HCT VFR BLD CALC: 25.5 % — LOW (ref 39–50)
HCT VFR BLDA CALC: 25 % — LOW (ref 39–51)
HGB BLD CALC-MCNC: 8.2 G/DL — LOW (ref 12.6–17.4)
HGB BLD-MCNC: 7.9 G/DL — LOW (ref 13–17)
LACTATE BLDV-MCNC: 1.4 MMOL/L — SIGNIFICANT CHANGE UP (ref 0.5–2)
MAGNESIUM SERPL-MCNC: 2.1 MG/DL — SIGNIFICANT CHANGE UP (ref 1.6–2.6)
MCHC RBC-ENTMCNC: 29.3 PG — SIGNIFICANT CHANGE UP (ref 27–34)
MCHC RBC-ENTMCNC: 31 GM/DL — LOW (ref 32–36)
MCV RBC AUTO: 94.4 FL — SIGNIFICANT CHANGE UP (ref 80–100)
NRBC # BLD: 0 /100 WBCS — SIGNIFICANT CHANGE UP (ref 0–0)
NRBC # FLD: 0 K/UL — SIGNIFICANT CHANGE UP (ref 0–0)
PCO2 BLDV: 43 MMHG — SIGNIFICANT CHANGE UP (ref 42–55)
PH BLDV: 7.46 — HIGH (ref 7.32–7.43)
PHOSPHATE SERPL-MCNC: 2.8 MG/DL — SIGNIFICANT CHANGE UP (ref 2.5–4.5)
PLATELET # BLD AUTO: 331 K/UL — SIGNIFICANT CHANGE UP (ref 150–400)
PO2 BLDV: 56 MMHG — HIGH (ref 25–45)
POTASSIUM BLDV-SCNC: 4.1 MMOL/L — SIGNIFICANT CHANGE UP (ref 3.5–5.1)
POTASSIUM SERPL-MCNC: 4 MMOL/L — SIGNIFICANT CHANGE UP (ref 3.5–5.3)
POTASSIUM SERPL-SCNC: 4 MMOL/L — SIGNIFICANT CHANGE UP (ref 3.5–5.3)
RBC # BLD: 2.7 M/UL — LOW (ref 4.2–5.8)
RBC # FLD: 17 % — HIGH (ref 10.3–14.5)
SAO2 % BLDV: 86.8 % — SIGNIFICANT CHANGE UP (ref 67–88)
SODIUM SERPL-SCNC: 145 MMOL/L — SIGNIFICANT CHANGE UP (ref 135–145)
WBC # BLD: 7.5 K/UL — SIGNIFICANT CHANGE UP (ref 3.8–10.5)
WBC # FLD AUTO: 7.5 K/UL — SIGNIFICANT CHANGE UP (ref 3.8–10.5)

## 2024-02-23 PROCEDURE — 99232 SBSQ HOSP IP/OBS MODERATE 35: CPT

## 2024-02-23 RX ORDER — TICAGRELOR 90 MG/1
60 TABLET ORAL EVERY 12 HOURS
Refills: 0 | Status: DISCONTINUED | OUTPATIENT
Start: 2024-02-23 | End: 2024-03-27

## 2024-02-23 RX ADMIN — Medication 12.5 MILLIGRAM(S): at 17:59

## 2024-02-23 RX ADMIN — Medication 1 DROP(S): at 06:15

## 2024-02-23 RX ADMIN — Medication 1 APPLICATION(S): at 22:07

## 2024-02-23 RX ADMIN — Medication 3 MILLILITER(S): at 08:38

## 2024-02-23 RX ADMIN — Medication 1 DROP(S): at 13:18

## 2024-02-23 RX ADMIN — HEPARIN SODIUM 5000 UNIT(S): 5000 INJECTION INTRAVENOUS; SUBCUTANEOUS at 06:19

## 2024-02-23 RX ADMIN — Medication 4: at 12:16

## 2024-02-23 RX ADMIN — Medication 3 MILLILITER(S): at 02:41

## 2024-02-23 RX ADMIN — HEPARIN SODIUM 5000 UNIT(S): 5000 INJECTION INTRAVENOUS; SUBCUTANEOUS at 13:19

## 2024-02-23 RX ADMIN — PANTOPRAZOLE SODIUM 40 MILLIGRAM(S): 20 TABLET, DELAYED RELEASE ORAL at 06:23

## 2024-02-23 RX ADMIN — PANTOPRAZOLE SODIUM 40 MILLIGRAM(S): 20 TABLET, DELAYED RELEASE ORAL at 17:59

## 2024-02-23 RX ADMIN — Medication 2 MILLIGRAM(S): at 22:07

## 2024-02-23 RX ADMIN — CHLORHEXIDINE GLUCONATE 15 MILLILITER(S): 213 SOLUTION TOPICAL at 18:00

## 2024-02-23 RX ADMIN — HEPARIN SODIUM 5000 UNIT(S): 5000 INJECTION INTRAVENOUS; SUBCUTANEOUS at 22:06

## 2024-02-23 RX ADMIN — Medication 2: at 06:16

## 2024-02-23 RX ADMIN — Medication 12.5 MILLIGRAM(S): at 06:20

## 2024-02-23 RX ADMIN — Medication 3 MILLILITER(S): at 21:30

## 2024-02-23 RX ADMIN — Medication 81 MILLIGRAM(S): at 13:19

## 2024-02-23 RX ADMIN — Medication 1 DROP(S): at 23:35

## 2024-02-23 RX ADMIN — Medication 1 GRAM(S): at 17:59

## 2024-02-23 RX ADMIN — TICAGRELOR 60 MILLIGRAM(S): 90 TABLET ORAL at 17:58

## 2024-02-23 RX ADMIN — Medication 1 GRAM(S): at 06:23

## 2024-02-23 RX ADMIN — CHLORHEXIDINE GLUCONATE 1 APPLICATION(S): 213 SOLUTION TOPICAL at 13:19

## 2024-02-23 RX ADMIN — Medication 1 DROP(S): at 17:59

## 2024-02-23 RX ADMIN — LEVETIRACETAM 500 MILLIGRAM(S): 250 TABLET, FILM COATED ORAL at 17:59

## 2024-02-23 RX ADMIN — INSULIN GLARGINE 12 UNIT(S): 100 INJECTION, SOLUTION SUBCUTANEOUS at 12:18

## 2024-02-23 RX ADMIN — LEVETIRACETAM 500 MILLIGRAM(S): 250 TABLET, FILM COATED ORAL at 06:19

## 2024-02-23 RX ADMIN — ESCITALOPRAM OXALATE 10 MILLIGRAM(S): 10 TABLET, FILM COATED ORAL at 13:19

## 2024-02-23 RX ADMIN — Medication 4: at 17:48

## 2024-02-23 RX ADMIN — CHLORHEXIDINE GLUCONATE 15 MILLILITER(S): 213 SOLUTION TOPICAL at 06:23

## 2024-02-23 NOTE — PROGRESS NOTE ADULT - ASSESSMENT
IMPRESSION:  89M w/ DM2, HTN, CVA, PAD, CKD3, and CAD-CABG, 12/23/23 p/w COVID19 infection, c/b respiratory failure/hypotension; now s/p tracheostomy    (1)Renal - CKD3; superimposed mild prerenal azotemia - numbers fluctuating based on hemodynamic status  (2)Hypokalemia - improved s/p repletion   (3)CV - now off pressors and midodrine, BP improved/stable    (4)Pulm - vent-dependent   (5ID - afebrile, s/p zosyn   (6)GI - s/p PEG (2/20)    RECOMMEND:   (1)Continue meds for GFR 30-40ml/min    Faby Cummins DNP  [facility]  [phone]  Hide Second Anesthesia?: No Wound Care: Petrolatum Type Of Destruction Used: Curettage Curettage Text: The wound bed was treated with curettage after the biopsy was performed. Depth Of Biopsy: dermis Anesthesia Volume In Cc (Will Not Render If 0): 1 Billing Type: Third-Party Bill Additional Anesthesia Volume In Cc (Will Not Render If 0): 0 Hemostasis: Aluminum Chloride Detail Level: Detailed Consent: Written consent was obtained and risks were reviewed including but not limited to scarring, infection, bleeding, scabbing, incomplete removal, nerve damage and allergy to anesthesia. Silver Nitrate Text: The wound bed was treated with silver nitrate after the biopsy was performed. Post-Care Instructions: I reviewed with the patient in detail post-care instructions. Patient is to keep the biopsy site dry overnight, and then apply bacitracin twice daily until healed. Patient may apply hydrogen peroxide soaks to remove any crusting. Biopsy Method: Dermablade Anesthesia Type: 1% lidocaine with epinephrine Dressing: bandage Information: Selecting Yes will display possible errors in your note based on the variables you have selected. This validation is only offered as a suggestion for you. PLEASE NOTE THAT THE VALIDATION TEXT WILL BE REMOVED WHEN YOU FINALIZE YOUR NOTE. IF YOU WANT TO FAX A PRELIMINARY NOTE YOU WILL NEED TO TOGGLE THIS TO 'NO' IF YOU DO NOT WANT IT IN YOUR FAXED NOTE. Electrodesiccation And Curettage Text: The wound bed was treated with electrodesiccation and curettage after the biopsy was performed. Was A Bandage Applied: Yes Electrodesiccation Text: The wound bed was treated with electrodesiccation after the biopsy was performed. Biopsy Type: H and E Notification Instructions: Patient will be notified of biopsy results. However, patient instructed to call the office if not contacted within 2 weeks. Cryotherapy Text: The wound bed was treated with cryotherapy after the biopsy was performed.

## 2024-02-23 NOTE — PROGRESS NOTE ADULT - ASSESSMENT
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 Course ultimately complicated by progressive WOB and O2 demand and patient intubated and transferred to MICU (1/22). Course further complicated by acute cholecystitis and s/p Perc Lynsey with IR on (1/26), HFrEF 38, pulmonary edema, superimposed PNA, failed extubation failed and prolonged vent time and s/p trach (2/13). Patient transferred to RCU (2/16) and s/p PEG (2/20)     NEUROLOGY   # Encephalopathy   - Hx of CVA with no residual deficits per family, however per family worsening confusion at home over the past 6 months (becoming disoriented to time) but prior to admissoin has been more confused, talking about seeing people that are not present.  - CTH (1/31) with no evidence of acute infarct  - Continue on ASA and brilinta  - Holding home Gabapentin and Memantine in setting of somnolence    # ICA stenosis   - CTA NECK (1/31) with moderate to severe narrowing left internal carotid at the origin. Severe narrowingright internal carotid by a tandem lesion 1.5 cm above the bifurcation with a narrowed internal carotid beyond the lesion. Extensive calcified plaque both cavernous and supraclinoid carotid arteries with narrowing but no occlusion. Mild narrowing proximal right M1 segment. Moderate narrowing both vertebral V4 segments. No perfusion abnormality at the Tmax 6 second threshold, but moderate hypoperfusion in the right MCA territory at the 4 second threshold.   - Continue on ASA and brilinta    # Myoclonic jerks   - Hx of myoclonic jerks post CVAs   - EEG (1/18-2/6) negative for seizures  - Continue on keppra and per neuro wishes to wean, however family wishes to keep current dose as home medication.     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   - Continue on lopressor 12.5mg BID in lieu of home Toprol 25   - AC discussed at length however with recent GIB will hold for now     # HFrEF 38 likely stress induced?   # Mild to moderate AS   - ECHO (12/2023) with EF 62 with normal LVSF and mild to moderate AS   - ECHO (2/2024) with EF 38, moderate LVSD with global LV hypokinesis, mildly reduced RVSF (TAPSE 1.3), mild to moderate MR, mild AR, and moderate AS.   - Continue on lopressor as above and discuss GDMT as BP would tolerate.   - Given AS BP control as able.   - Discuss intermittently diuresis    # CAD with CABG   - CATH (5/2022) with dLAD 70, SVG graft to OM1 with 90 in stent stenosis s/p PCI and GEMMA placement, and LIMA graft to mLAD with no disease.   - Continue on ASA and brilinta    RESPIRATORY   # Acute respiratory failure second to SARS-COV-2 vs pulm edema vs PNA  - Presented with COVID complicated by sepsis second to acute lynsey and worsening respiratory failure second to pulmonary edema requiring intubation.   - Prolonged intubaiton and s/p tracheostomy (2/13)   - CT CHEST 1/16 with new small bilateral pleural effusions/ atelectasis/ patchy bilateral ground-glass opacities are consistent with pulmonary edema.  - Completed ABX and COVID regimen as below   - Continue on vent and SBT 15/5 as tolerated.   - Continue on nebs and hold further HTS given intermittent hemoptysis  - Continue on diuresis as above    # Mild hemoptysis likely from suction trauma   - s/p bronch (2/18) with no active bleed  - Hemoptysis improved with TXA and holding further HTS as above   - Careful with suctioning     GI  # Nutrition/ Dysphagia   - PEG placed by GI on 2/20 and tube feeds continued.   - Monitor tolerance and BM regimen as needed.     # UGIB   - EGD (1/2) with esophagitis and bleeding Dieulafoy's lesion s/p clips.   - Continue on PPI and carafate     # SMA calcification   - CTA demonstrating mesenteric fat stranding associated with ascending/transverse colon  - Diagnostic laparoscopy (12/24) with small bowel and visible colon viable, some inflammation of omentum in RUQ  - SMA Angiogram and stent placed (12/25).   - Continue on ASA and brilinta   - Brilinta held briefly given hemoptysis as above, however challenged with DVT PPX and if remains stable will resume on 2/23    # Acute Cholecystits   - CT (12/26) with distended GB with cholelithiasis and sludge   - HIDA (12/29) POSITIVE for acute cholecystitis   - Case discussed with IR at length and s/p PERC LYNSEY (1/26)   - Completed zosyn course (12/24-12/31)     / RENAL   # CKD   - CRE at baseline   - Monitor renal function and UOP     INFECTIOUS DISEASE   # COVID with superimposed PNA   - COVID POSITIVE (12/23) and completed remdesivir x 1 dose and paxlovid course.   - WOB worsened as above requiring intubation and cultures negative. Zosyn completed empirically however hypothermic (2/11) and BCx, UA, RVP and BAL negative.   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 Course ultimately complicated by progressive WOB and O2 demand and patient intubated and transferred to MICU (1/22). Course further complicated by acute cholecystitis and s/p Perc Lynsey with IR on (1/26), HFrEF 38, pulmonary edema, superimposed PNA, failed extubation failed and prolonged vent time and s/p trach (2/13). Patient transferred to RCU (2/16) and s/p PEG (2/20)     NEUROLOGY   # Encephalopathy   - Hx of CVA with no residual deficits per family, however per family worsening confusion at home over the past 6 months (becoming disoriented to time) but prior to admission has been more confused, talking about seeing people that are not present.  - CTH (1/31) with no evidence of acute infarct  - Continue on ASA and brilinta  - Holding home Gabapentin and Memantine in setting of somnolence    # ICA stenosis   - CTA NECK (1/31) with moderate to severe narrowing left internal carotid at the origin. Severe narrowingright internal carotid by a tandem lesion 1.5 cm above the bifurcation with a narrowed internal carotid beyond the lesion. Extensive calcified plaque both cavernous and supraclinoid carotid arteries with narrowing but no occlusion. Mild narrowing proximal right M1 segment. Moderate narrowing both vertebral V4 segments. No perfusion abnormality at the Tmax 6 second threshold, but moderate hypoperfusion in the right MCA territory at the 4 second threshold.   - Continue on ASA and brilinta    # Myoclonic jerks   - Hx of myoclonic jerks post CVAs   - EEG (1/18-2/6) negative for seizures  - Continue on keppra and per neuro wishes to wean, however family wishes to keep current dose as home medication.     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   - Continue on lopressor 12.5mg BID in lieu of home Toprol 25   - AC discussed at length however with recent GIB will hold for now     # HFrEF 38 likely stress induced?   # Mild to moderate AS   - ECHO (12/2023) with EF 62 with normal LVSF and mild to moderate AS   - ECHO (2/2024) with EF 38, moderate LVSD with global LV hypokinesis, mildly reduced RVSF (TAPSE 1.3), mild to moderate MR, mild AR, and moderate AS.   - Continue on lopressor as above and discuss GDMT as BP would tolerate.   - May require intermittently diuresis however appears euvolemic currently.   - Given AS BP control as able.     # CAD with CABG   - CATH (5/2022) with dLAD 70, SVG graft to OM1 with 90 in stent stenosis s/p PCI and GEMMA placement, and LIMA graft to mLAD with no disease.   - Continue on ASA and brilinta    RESPIRATORY   # Acute respiratory failure second to SARS-COV-2 vs pulm edema vs PNA  - Presented with COVID complicated by sepsis second to acute lynsey and worsening respiratory failure second to pulmonary edema requiring intubation.   - Prolonged intubation and s/p tracheostomy (2/13)   - CT CHEST 1/16 with new small bilateral pleural effusions/ atelectasis/ patchy bilateral ground-glass opacities are consistent with pulmonary edema.  - Completed ABX and COVID regimen as below   - Continue on vent and SBT 15/5 as tolerated.   - Continue on nebs and hold further HTS given intermittent hemoptysis  - Continue on diuresis as above    # Mild hemoptysis likely from suction trauma   - s/p bronch (2/18) with no active bleed  - Hemoptysis improved with TXA and holding further HTS as above   - Careful with suctioning     GI  # Nutrition/ Dysphagia   - PEG placed by GI on 2/20 and tube feeds continued.   - Monitor tolerance and BM regimen as needed.     # UGIB   - EGD (1/2) with esophagitis and bleeding Dieulafoy's lesion s/p clips.   - Continue on PPI and carafate     # SMA calcification   - CTA demonstrating mesenteric fat stranding associated with ascending/transverse colon  - Diagnostic laparoscopy (12/24) with small bowel and visible colon viable, some inflammation of omentum in RUQ  - SMA Angiogram and stent placed (12/25).   - Continue on ASA and brilinta     # Acute Cholecystits   - CT (12/26) with distended GB with cholelithiasis and sludge   - HIDA (12/29) POSITIVE for acute cholecystitis   - Case discussed with IR at length and s/p PERC LYNSEY (1/26)   - Completed zosyn course (12/24-12/31)     / RENAL   # CKD   - CRE at baseline   - Monitor renal function and UOP     INFECTIOUS DISEASE   # COVID with superimposed PNA   - COVID POSITIVE (12/23) and completed remdesivir x 1 dose and paxlovid course.   - WOB worsened as above requiring intubation and cultures negative. Zosyn completed empirically however hypothermic (2/11) and BCx, UA, RVP and BAL negative.   - Completed additional zosyn (2/12-2/19) empirically   - Monitor oFF ABX     HEME  # AOCD   - Monitor HH   - DVT PPX with HSQ     ENDOCRINE   # DM2 A1C 9.3   - Continue on lantus 12 and ISS     SKIN/ TUBES   - Trach (2/13)   - PEG (2/20)   - PERC LYNSEY (1/26 - )     ETHICS   - FULL CODE     DISPO - vent facility and family aware. SW aware to send out to facilities and family to discuss on DISPO plan.

## 2024-02-23 NOTE — PROGRESS NOTE ADULT - SUBJECTIVE AND OBJECTIVE BOX
CHIEF COMPLAINT: Patient is a 89y old  Male who presents with a chief complaint of COVID19, Chest pain (22 Feb 2024 12:30)      INTERVAL EVENTS:  - No interval events overnight     REVIEW OF SYSTEMS: Seen by bedside during AM rounds and unable to assess ROS second to vented and language barrier    Mode: AC/ CMV (Assist Control/ Continuous Mandatory Ventilation), RR (machine): 12, TV (machine): 450, FiO2: 30, PEEP: 5, MAP: 12, PIP: 34      OBJECTIVE:  ICU Vital Signs Last 24 Hrs  T(C): 37.2 (23 Feb 2024 08:00), Max: 37.7 (22 Feb 2024 20:00)  T(F): 99 (23 Feb 2024 08:00), Max: 99.8 (22 Feb 2024 20:00)  HR: 95 (23 Feb 2024 08:00) (11 - 116)  BP: 125/63 (23 Feb 2024 08:00) (118/67 - 140/68)  BP(mean): 81 (23 Feb 2024 08:00) (81 - 90)  ABP: --  ABP(mean): --  RR: 20 (23 Feb 2024 08:00) (14 - 20)  SpO2: 99% (23 Feb 2024 04:00) (93% - 99%)    O2 Parameters below as of 23 Feb 2024 08:00  Patient On (Oxygen Delivery Method): ventilator, AC    O2 Concentration (%): 30      Mode: AC/ CMV (Assist Control/ Continuous Mandatory Ventilation), RR (machine): 12, TV (machine): 450, FiO2: 30, PEEP: 5, MAP: 12, PIP: 34    02-22 @ 07:01  -  02-23 @ 07:00  --------------------------------------------------------  IN: 1080 mL / OUT: 750 mL / NET: 330 mL      CAPILLARY BLOOD GLUCOSE  POCT Blood Glucose.: 198 mg/dL (23 Feb 2024 05:17)      HOSPITAL MEDICATIONS:  MEDICATIONS  (STANDING):  albuterol/ipratropium for Nebulization 3 milliLiter(s) Nebulizer every 6 hours  artificial  tears Solution 1 Drop(s) Left EYE four times a day  aspirin  chewable 81 milliGRAM(s) Enteral Tube daily  chlorhexidine 0.12% Liquid 15 milliLiter(s) Oral Mucosa every 12 hours  chlorhexidine 2% Cloths 1 Application(s) Topical daily  dextrose 5%. 1000 milliLiter(s) (100 mL/Hr) IV Continuous <Continuous>  dextrose 5%. 1000 milliLiter(s) (50 mL/Hr) IV Continuous <Continuous>  dextrose 50% Injectable 25 Gram(s) IV Push once  dextrose 50% Injectable 12.5 Gram(s) IV Push once  dextrose 50% Injectable 25 Gram(s) IV Push once  doxazosin 2 milliGRAM(s) Oral at bedtime  escitalopram Solution 10 milliGRAM(s) Oral daily  glucagon  Injectable 1 milliGRAM(s) IntraMuscular once  heparin   Injectable 5000 Unit(s) SubCutaneous every 8 hours  insulin glargine Injectable (LANTUS) 12 Unit(s) SubCutaneous <User Schedule>  insulin lispro (ADMELOG) corrective regimen sliding scale   SubCutaneous every 6 hours  levETIRAcetam  Solution 500 milliGRAM(s) Oral two times a day  metoprolol tartrate 12.5 milliGRAM(s) Oral every 12 hours  pantoprazole   Suspension 40 milliGRAM(s) Oral every 12 hours  petrolatum Ophthalmic Ointment 1 Application(s) Left EYE at bedtime  sucralfate suspension 1 Gram(s) Enteral Tube two times a day    MEDICATIONS  (PRN):  dextrose Oral Gel 15 Gram(s) Oral once PRN Blood Glucose LESS THAN 70 milliGRAM(s)/deciliter  oxyCODONE    IR 2.5 milliGRAM(s) Oral every 4 hours PRN Moderate to severe pain      PHYSICAL EXAMINATION  General: NAD   HEENT: Trach present   Cards: S1/S2, no murmurs   Pulm: Course vent sounds bilaterally. No wheezes.   Abdomen: Soft, nondistended and nontender. BS (+) PEG present   Extremities: No pedal edema. GEORGE of BL upper > lower extremities with some weakness.   Neurology: Awake and alert, tracks, however does not follow commands likely second to language barrier, with no acute focal neurological deficits     LABS:                        7.9    7.50  )-----------( 331      ( 23 Feb 2024 06:36 )             25.5     02-23    145  |  108<H>  |  22  ----------------------------<  195<H>  4.0   |  27  |  1.51<H>    Ca    8.6      23 Feb 2024 06:36  Phos  2.8     02-23  Mg     2.10     02-23        Urinalysis Basic - ( 23 Feb 2024 06:36 )  Color: x / Appearance: x / SG: x / pH: x  Gluc: 195 mg/dL / Ketone: x  / Bili: x / Urobili: x   Blood: x / Protein: x / Nitrite: x   Leuk Esterase: x / RBC: x / WBC x   Sq Epi: x / Non Sq Epi: x / Bacteria: x            PAST MEDICAL & SURGICAL HISTORY:  Hyperlipemia      Hypertension      Coronary Artery Disease      Diabetes Mellitus Type II      Stented Coronary Artery  total 5 stents, last stent 5/2019      Neuropathy      Myocardial infarction      Stroke  mild left facial numbness   no other residuals verbalized      Myoclonic jerking      Stage 3 chronic kidney disease      History of Cataract Extraction      Hx of CABG      H/O coronary angiogram      S/P coronary artery stent placement  1/6/09      S/P placement of cardiac pacemaker          FAMILY HISTORY:  No pertinent family history in first degree relatives        Social History:  Lives with family  Ambulates with walker  Denies tobacco, EtOH, or illicit substance use (23 Dec 2023 22:51)      RADIOLOGY:  [ ] Reviewed and interpreted by me    PULMONARY FUNCTION TESTS:    EKG:

## 2024-02-23 NOTE — PROGRESS NOTE ADULT - SUBJECTIVE AND OBJECTIVE BOX
NEPHROLOGY     Patient seen and examined with family at bedside, trach to vent, in no acute distress.     MEDICATIONS  (STANDING):  albuterol/ipratropium for Nebulization 3 milliLiter(s) Nebulizer every 6 hours  artificial  tears Solution 1 Drop(s) Left EYE four times a day  aspirin  chewable 81 milliGRAM(s) Enteral Tube daily  chlorhexidine 0.12% Liquid 15 milliLiter(s) Oral Mucosa every 12 hours  chlorhexidine 2% Cloths 1 Application(s) Topical daily  dextrose 5%. 1000 milliLiter(s) (100 mL/Hr) IV Continuous <Continuous>  dextrose 5%. 1000 milliLiter(s) (50 mL/Hr) IV Continuous <Continuous>  dextrose 50% Injectable 25 Gram(s) IV Push once  dextrose 50% Injectable 25 Gram(s) IV Push once  dextrose 50% Injectable 12.5 Gram(s) IV Push once  doxazosin 2 milliGRAM(s) Oral at bedtime  escitalopram Solution 10 milliGRAM(s) Oral daily  glucagon  Injectable 1 milliGRAM(s) IntraMuscular once  heparin   Injectable 5000 Unit(s) SubCutaneous every 8 hours  insulin glargine Injectable (LANTUS) 12 Unit(s) SubCutaneous <User Schedule>  insulin lispro (ADMELOG) corrective regimen sliding scale   SubCutaneous every 6 hours  levETIRAcetam  Solution 500 milliGRAM(s) Oral two times a day  metoprolol tartrate 12.5 milliGRAM(s) Oral every 12 hours  pantoprazole   Suspension 40 milliGRAM(s) Oral every 12 hours  petrolatum Ophthalmic Ointment 1 Application(s) Left EYE at bedtime  sucralfate suspension 1 Gram(s) Enteral Tube two times a day    VITALS:  T(C): , Max: 37.7 (02-22-24 @ 20:00)  T(F): , Max: 99.8 (02-22-24 @ 20:00)  HR: 104 (02-23-24 @ 08:46)  BP: 125/63 (02-23-24 @ 08:00)  BP(mean): 81 (02-23-24 @ 08:00)  RR: 20 (02-23-24 @ 08:00)  SpO2: 97% (02-23-24 @ 08:46)    I and O's:    02-22 @ 07:01  -  02-23 @ 07:00  --------------------------------------------------------  IN: 1080 mL / OUT: 750 mL / NET: 330 mL    02-23 @ 07:01  -  02-23 @ 09:57  --------------------------------------------------------  IN: 90 mL / OUT: 0 mL / NET: 90 mL    PHYSICAL EXAM:  Constitutional: NAD trach to vent   HEENT: +trach  Respiratory: coarse bs   Cardiovascular: tachy s1s2  Gastrointestinal: BS+, soft, NT/ND +PEG  Extremities:+ peripheral edema  :  + external catheter   Skin: No rashes    LABS:                        7.9    7.50  )-----------( 331      ( 23 Feb 2024 06:36 )             25.5     02-23    145  |  108<H>  |  22  ----------------------------<  195<H>  4.0   |  27  |  1.51<H>    Ca    8.6      23 Feb 2024 06:36  Phos  2.8     02-23  Mg     2.10     02-23

## 2024-02-23 NOTE — PROGRESS NOTE ADULT - NS ATTEND AMEND GEN_ALL_CORE FT
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 Course c/b acute cholecystitis s/p perc asa 1/26 by IR, also with recent SMA stent.     Patient failed trial of extubation, also possible sz. Now s/p tracheostomy and PEG. EEG without sz. Currently on Keppra. S/P another course of abx for fevers and hypotension.     PPM interrogation with pAFIB.    #Acute hypoxemic/hypercapnic respiratory failure  #Multifocal Pneumonia  #Dysphagia  #Acute cholecystitis  #Encephalopathy  #Afib  #SMA stent   #Petechial hemorrhages  - Frequent aspiration noted clinically and on CT scans. now reintubated, unable to clear secretions. 2/2 to poor cough and mental status. Now s/p tracheostomy.   - EEG now without s/w keppra 500.. MRI without acute findings.   - S/P course of antibiotics for MSSA pna as well as aspiration. Continue to monitor off abx.  - C/W vent, adjust based on gas, PS as tolerated.  - S/P IR drainage of biliary tube, monitor output. Reconsult IR closer to d/c if drain should remain in place.   - new AFib, seen on interrogation, will f/u with family risk and benefits. Per vascular would want to c/w DAPT for stent. D/W family. Will think about a/c after restarting Brilinta  - C/W ASA, Brilinta. per GI can restart on 2/23.   - Seen by optho, continue to monitor  - Lasix PRN to maintain euvolemia.   - S/P PEG now on tube feeds.   - DVT ppx - SQH  - Dispo- full code.

## 2024-02-24 LAB
ANION GAP SERPL CALC-SCNC: 11 MMOL/L — SIGNIFICANT CHANGE UP (ref 7–14)
BASE EXCESS BLDV CALC-SCNC: 6 MMOL/L — HIGH (ref -2–3)
BASOPHILS # BLD AUTO: 0.05 K/UL — SIGNIFICANT CHANGE UP (ref 0–0.2)
BASOPHILS NFR BLD AUTO: 0.6 % — SIGNIFICANT CHANGE UP (ref 0–2)
BLOOD GAS VENOUS COMPREHENSIVE RESULT: SIGNIFICANT CHANGE UP
BUN SERPL-MCNC: 25 MG/DL — HIGH (ref 7–23)
CALCIUM SERPL-MCNC: 8.7 MG/DL — SIGNIFICANT CHANGE UP (ref 8.4–10.5)
CHLORIDE BLDV-SCNC: 113 MMOL/L — HIGH (ref 96–108)
CHLORIDE SERPL-SCNC: 110 MMOL/L — HIGH (ref 98–107)
CO2 BLDV-SCNC: 32 MMOL/L — HIGH (ref 22–26)
CO2 SERPL-SCNC: 27 MMOL/L — SIGNIFICANT CHANGE UP (ref 22–31)
CREAT SERPL-MCNC: 1.51 MG/DL — HIGH (ref 0.5–1.3)
CULTURE RESULTS: SIGNIFICANT CHANGE UP
EGFR: 44 ML/MIN/1.73M2 — LOW
EOSINOPHIL # BLD AUTO: 0.72 K/UL — HIGH (ref 0–0.5)
EOSINOPHIL NFR BLD AUTO: 8.6 % — HIGH (ref 0–6)
GAS PNL BLDV: 147 MMOL/L — HIGH (ref 136–145)
GLUCOSE BLDC GLUCOMTR-MCNC: 174 MG/DL — HIGH (ref 70–99)
GLUCOSE BLDC GLUCOMTR-MCNC: 212 MG/DL — HIGH (ref 70–99)
GLUCOSE BLDC GLUCOMTR-MCNC: 241 MG/DL — HIGH (ref 70–99)
GLUCOSE BLDC GLUCOMTR-MCNC: 99 MG/DL — SIGNIFICANT CHANGE UP (ref 70–99)
GLUCOSE BLDV-MCNC: 93 MG/DL — SIGNIFICANT CHANGE UP (ref 70–99)
GLUCOSE SERPL-MCNC: 95 MG/DL — SIGNIFICANT CHANGE UP (ref 70–99)
HCO3 BLDV-SCNC: 31 MMOL/L — HIGH (ref 22–29)
HCT VFR BLD CALC: 26.7 % — LOW (ref 39–50)
HCT VFR BLDA CALC: 26 % — LOW (ref 39–51)
HGB BLD CALC-MCNC: 8.5 G/DL — LOW (ref 12.6–17.4)
HGB BLD-MCNC: 8.1 G/DL — LOW (ref 13–17)
IANC: 4.93 K/UL — SIGNIFICANT CHANGE UP (ref 1.8–7.4)
IMM GRANULOCYTES NFR BLD AUTO: 0.5 % — SIGNIFICANT CHANGE UP (ref 0–0.9)
LACTATE BLDV-MCNC: 1.5 MMOL/L — SIGNIFICANT CHANGE UP (ref 0.5–2)
LYMPHOCYTES # BLD AUTO: 2.15 K/UL — SIGNIFICANT CHANGE UP (ref 1–3.3)
LYMPHOCYTES # BLD AUTO: 25.7 % — SIGNIFICANT CHANGE UP (ref 13–44)
MAGNESIUM SERPL-MCNC: 2.1 MG/DL — SIGNIFICANT CHANGE UP (ref 1.6–2.6)
MCHC RBC-ENTMCNC: 28.7 PG — SIGNIFICANT CHANGE UP (ref 27–34)
MCHC RBC-ENTMCNC: 30.3 GM/DL — LOW (ref 32–36)
MCV RBC AUTO: 94.7 FL — SIGNIFICANT CHANGE UP (ref 80–100)
MONOCYTES # BLD AUTO: 0.49 K/UL — SIGNIFICANT CHANGE UP (ref 0–0.9)
MONOCYTES NFR BLD AUTO: 5.8 % — SIGNIFICANT CHANGE UP (ref 2–14)
NEUTROPHILS # BLD AUTO: 4.93 K/UL — SIGNIFICANT CHANGE UP (ref 1.8–7.4)
NEUTROPHILS NFR BLD AUTO: 58.8 % — SIGNIFICANT CHANGE UP (ref 43–77)
NRBC # BLD: 0 /100 WBCS — SIGNIFICANT CHANGE UP (ref 0–0)
NRBC # FLD: 0 K/UL — SIGNIFICANT CHANGE UP (ref 0–0)
PCO2 BLDV: 44 MMHG — SIGNIFICANT CHANGE UP (ref 42–55)
PH BLDV: 7.45 — HIGH (ref 7.32–7.43)
PHOSPHATE SERPL-MCNC: 2.8 MG/DL — SIGNIFICANT CHANGE UP (ref 2.5–4.5)
PLATELET # BLD AUTO: 343 K/UL — SIGNIFICANT CHANGE UP (ref 150–400)
PO2 BLDV: 70 MMHG — HIGH (ref 25–45)
POTASSIUM BLDV-SCNC: 3.5 MMOL/L — SIGNIFICANT CHANGE UP (ref 3.5–5.1)
POTASSIUM SERPL-MCNC: 3.5 MMOL/L — SIGNIFICANT CHANGE UP (ref 3.5–5.3)
POTASSIUM SERPL-SCNC: 3.5 MMOL/L — SIGNIFICANT CHANGE UP (ref 3.5–5.3)
RBC # BLD: 2.82 M/UL — LOW (ref 4.2–5.8)
RBC # FLD: 17.1 % — HIGH (ref 10.3–14.5)
SAO2 % BLDV: 94.5 % — HIGH (ref 67–88)
SODIUM SERPL-SCNC: 148 MMOL/L — HIGH (ref 135–145)
SPECIMEN SOURCE: SIGNIFICANT CHANGE UP
WBC # BLD: 8.38 K/UL — SIGNIFICANT CHANGE UP (ref 3.8–10.5)
WBC # FLD AUTO: 8.38 K/UL — SIGNIFICANT CHANGE UP (ref 3.8–10.5)

## 2024-02-24 PROCEDURE — 99232 SBSQ HOSP IP/OBS MODERATE 35: CPT

## 2024-02-24 PROCEDURE — 71045 X-RAY EXAM CHEST 1 VIEW: CPT | Mod: 26

## 2024-02-24 RX ORDER — ACETAMINOPHEN 500 MG
650 TABLET ORAL EVERY 6 HOURS
Refills: 0 | Status: DISCONTINUED | OUTPATIENT
Start: 2024-02-24 | End: 2024-03-27

## 2024-02-24 RX ORDER — FUROSEMIDE 40 MG
40 TABLET ORAL ONCE
Refills: 0 | Status: COMPLETED | OUTPATIENT
Start: 2024-02-24 | End: 2024-02-24

## 2024-02-24 RX ORDER — CARVEDILOL PHOSPHATE 80 MG/1
6.25 CAPSULE, EXTENDED RELEASE ORAL EVERY 12 HOURS
Refills: 0 | Status: DISCONTINUED | OUTPATIENT
Start: 2024-02-25 | End: 2024-03-27

## 2024-02-24 RX ADMIN — LEVETIRACETAM 500 MILLIGRAM(S): 250 TABLET, FILM COATED ORAL at 06:16

## 2024-02-24 RX ADMIN — Medication 1 DROP(S): at 07:03

## 2024-02-24 RX ADMIN — CHLORHEXIDINE GLUCONATE 1 APPLICATION(S): 213 SOLUTION TOPICAL at 11:18

## 2024-02-24 RX ADMIN — CHLORHEXIDINE GLUCONATE 15 MILLILITER(S): 213 SOLUTION TOPICAL at 17:33

## 2024-02-24 RX ADMIN — Medication 3 MILLILITER(S): at 20:50

## 2024-02-24 RX ADMIN — TICAGRELOR 60 MILLIGRAM(S): 90 TABLET ORAL at 06:17

## 2024-02-24 RX ADMIN — Medication 650 MILLIGRAM(S): at 21:51

## 2024-02-24 RX ADMIN — Medication 4: at 17:32

## 2024-02-24 RX ADMIN — INSULIN GLARGINE 12 UNIT(S): 100 INJECTION, SOLUTION SUBCUTANEOUS at 11:58

## 2024-02-24 RX ADMIN — Medication 40 MILLIGRAM(S): at 18:04

## 2024-02-24 RX ADMIN — Medication 3 MILLILITER(S): at 08:21

## 2024-02-24 RX ADMIN — LEVETIRACETAM 500 MILLIGRAM(S): 250 TABLET, FILM COATED ORAL at 17:33

## 2024-02-24 RX ADMIN — Medication 2 MILLIGRAM(S): at 21:51

## 2024-02-24 RX ADMIN — Medication 1 GRAM(S): at 06:17

## 2024-02-24 RX ADMIN — Medication 3 MILLILITER(S): at 03:21

## 2024-02-24 RX ADMIN — TICAGRELOR 60 MILLIGRAM(S): 90 TABLET ORAL at 17:33

## 2024-02-24 RX ADMIN — Medication 2: at 11:58

## 2024-02-24 RX ADMIN — Medication 1 APPLICATION(S): at 21:55

## 2024-02-24 RX ADMIN — Medication 1 GRAM(S): at 17:32

## 2024-02-24 RX ADMIN — HEPARIN SODIUM 5000 UNIT(S): 5000 INJECTION INTRAVENOUS; SUBCUTANEOUS at 06:16

## 2024-02-24 RX ADMIN — Medication 1 DROP(S): at 17:31

## 2024-02-24 RX ADMIN — PANTOPRAZOLE SODIUM 40 MILLIGRAM(S): 20 TABLET, DELAYED RELEASE ORAL at 17:33

## 2024-02-24 RX ADMIN — Medication 12.5 MILLIGRAM(S): at 17:33

## 2024-02-24 RX ADMIN — CHLORHEXIDINE GLUCONATE 15 MILLILITER(S): 213 SOLUTION TOPICAL at 07:03

## 2024-02-24 RX ADMIN — HEPARIN SODIUM 5000 UNIT(S): 5000 INJECTION INTRAVENOUS; SUBCUTANEOUS at 21:50

## 2024-02-24 RX ADMIN — ESCITALOPRAM OXALATE 10 MILLIGRAM(S): 10 TABLET, FILM COATED ORAL at 11:58

## 2024-02-24 RX ADMIN — HEPARIN SODIUM 5000 UNIT(S): 5000 INJECTION INTRAVENOUS; SUBCUTANEOUS at 13:09

## 2024-02-24 RX ADMIN — PANTOPRAZOLE SODIUM 40 MILLIGRAM(S): 20 TABLET, DELAYED RELEASE ORAL at 06:17

## 2024-02-24 RX ADMIN — Medication 81 MILLIGRAM(S): at 11:19

## 2024-02-24 RX ADMIN — Medication 1 DROP(S): at 11:17

## 2024-02-24 RX ADMIN — Medication 12.5 MILLIGRAM(S): at 06:18

## 2024-02-24 RX ADMIN — Medication 3 MILLILITER(S): at 15:54

## 2024-02-24 NOTE — PROGRESS NOTE ADULT - SUBJECTIVE AND OBJECTIVE BOX
CHIEF COMPLAINT: Patient is a 89y old  Male who presents with a chief complaint of COVID19, Chest pain (23 Feb 2024 09:57)      INTERVAL EVENTS:    REVIEW OF SYSTEMS: Seen by bedside during AM rounds and     Mode: AC/ CMV (Assist Control/ Continuous Mandatory Ventilation), RR (machine): 12, TV (machine): 450, FiO2: 30, PEEP: 5, MAP: 13, PIP: 37      OBJECTIVE:  ICU Vital Signs Last 24 Hrs  T(C): 37.3 (23 Feb 2024 20:00), Max: 37.6 (23 Feb 2024 16:00)  T(F): 99.2 (23 Feb 2024 20:00), Max: 99.6 (23 Feb 2024 16:00)  HR: 101 (24 Feb 2024 04:00) (89 - 108)  BP: 120/83 (24 Feb 2024 04:00) (115/55 - 121/87)  BP(mean): 91 (24 Feb 2024 04:00) (71 - 99)  ABP: --  ABP(mean): --  RR: 20 (24 Feb 2024 04:00) (20 - 21)  SpO2: 98% (24 Feb 2024 04:00) (96% - 100%)    O2 Parameters below as of 24 Feb 2024 04:00  Patient On (Oxygen Delivery Method): ventilator    O2 Concentration (%): 30      Mode: AC/ CMV (Assist Control/ Continuous Mandatory Ventilation), RR (machine): 12, TV (machine): 450, FiO2: 30, PEEP: 5, MAP: 13, PIP: 37    02-23 @ 07:01  -  02-24 @ 07:00  --------------------------------------------------------  IN: 1250 mL / OUT: 832 mL / NET: 418 mL      CAPILLARY BLOOD GLUCOSE      POCT Blood Glucose.: 99 mg/dL (24 Feb 2024 05:06)      HOSPITAL MEDICATIONS:  MEDICATIONS  (STANDING):  albuterol/ipratropium for Nebulization 3 milliLiter(s) Nebulizer every 6 hours  artificial  tears Solution 1 Drop(s) Left EYE four times a day  aspirin  chewable 81 milliGRAM(s) Enteral Tube daily  chlorhexidine 0.12% Liquid 15 milliLiter(s) Oral Mucosa every 12 hours  chlorhexidine 2% Cloths 1 Application(s) Topical daily  dextrose 5%. 1000 milliLiter(s) (50 mL/Hr) IV Continuous <Continuous>  dextrose 5%. 1000 milliLiter(s) (100 mL/Hr) IV Continuous <Continuous>  dextrose 50% Injectable 25 Gram(s) IV Push once  dextrose 50% Injectable 25 Gram(s) IV Push once  dextrose 50% Injectable 12.5 Gram(s) IV Push once  doxazosin 2 milliGRAM(s) Oral at bedtime  escitalopram Solution 10 milliGRAM(s) Oral daily  glucagon  Injectable 1 milliGRAM(s) IntraMuscular once  heparin   Injectable 5000 Unit(s) SubCutaneous every 8 hours  insulin glargine Injectable (LANTUS) 12 Unit(s) SubCutaneous <User Schedule>  insulin lispro (ADMELOG) corrective regimen sliding scale   SubCutaneous every 6 hours  levETIRAcetam  Solution 500 milliGRAM(s) Oral two times a day  metoprolol tartrate 12.5 milliGRAM(s) Oral every 12 hours  pantoprazole   Suspension 40 milliGRAM(s) Oral every 12 hours  petrolatum Ophthalmic Ointment 1 Application(s) Left EYE at bedtime  sucralfate suspension 1 Gram(s) Enteral Tube two times a day  ticagrelor 60 milliGRAM(s) Oral every 12 hours    MEDICATIONS  (PRN):  dextrose Oral Gel 15 Gram(s) Oral once PRN Blood Glucose LESS THAN 70 milliGRAM(s)/deciliter      PHYSICAL EXAMINATION    LABS:                        8.1    8.38  )-----------( 343      ( 24 Feb 2024 05:13 )             26.7     02-24    148<H>  |  110<H>  |  25<H>  ----------------------------<  95  3.5   |  27  |  1.51<H>    Ca    8.7      24 Feb 2024 05:13  Phos  2.8     02-24  Mg     2.10     02-24        Urinalysis Basic - ( 24 Feb 2024 05:13 )    Color: x / Appearance: x / SG: x / pH: x  Gluc: 95 mg/dL / Ketone: x  / Bili: x / Urobili: x   Blood: x / Protein: x / Nitrite: x   Leuk Esterase: x / RBC: x / WBC x   Sq Epi: x / Non Sq Epi: x / Bacteria: x        Venous Blood Gas:  02-24 @ 05:10  7.45/44/70/31/94.5  VBG Lactate: 1.5  Venous Blood Gas:  02-23 @ 15:30  7.46/43/56/31/86.8  VBG Lactate: 1.4      PAST MEDICAL & SURGICAL HISTORY:  Hyperlipemia      Hypertension      Coronary Artery Disease      Diabetes Mellitus Type II      Stented Coronary Artery  total 5 stents, last stent 5/2019      Neuropathy      Myocardial infarction      Stroke  mild left facial numbness   no other residuals verbalized      Myoclonic jerking      Stage 3 chronic kidney disease      History of Cataract Extraction      Hx of CABG      H/O coronary angiogram      S/P coronary artery stent placement  1/6/09      S/P placement of cardiac pacemaker          FAMILY HISTORY:  No pertinent family history in first degree relatives        Social History:  Lives with family  Ambulates with walker  Denies tobacco, EtOH, or illicit substance use (23 Dec 2023 22:51)      RADIOLOGY:  [ ] Reviewed and interpreted by me    PULMONARY FUNCTION TESTS:    EKG: CHIEF COMPLAINT: Patient is a 89y old  Male who presents with a chief complaint of COVID19, Chest pain (23 Feb 2024 09:57)      INTERVAL EVENTS:  - No interval events overnight   - Not tolerating SBT trials and repeat CXR with pulm edema and BL pleural effusions.   - Mild hemoptysis today second to suctioning trauma. Re-educated family and RN to limit suctioning.   - Lethargy more improved today off oxycodone. Pain control with tylenol for now .   - Trach sutures removed today   - Loose stools and ban added     REVIEW OF SYSTEMS: Seen by bedside during AM rounds and unable to assess ROS second to vented/ nonverbal     Mode: AC/ CMV (Assist Control/ Continuous Mandatory Ventilation), RR (machine): 12, TV (machine): 450, FiO2: 30, PEEP: 5, MAP: 13, PIP: 37      OBJECTIVE:  ICU Vital Signs Last 24 Hrs  T(C): 37.3 (23 Feb 2024 20:00), Max: 37.6 (23 Feb 2024 16:00)  T(F): 99.2 (23 Feb 2024 20:00), Max: 99.6 (23 Feb 2024 16:00)  HR: 101 (24 Feb 2024 04:00) (89 - 108)  BP: 120/83 (24 Feb 2024 04:00) (115/55 - 121/87)  BP(mean): 91 (24 Feb 2024 04:00) (71 - 99)  ABP: --  ABP(mean): --  RR: 20 (24 Feb 2024 04:00) (20 - 21)  SpO2: 98% (24 Feb 2024 04:00) (96% - 100%)    O2 Parameters below as of 24 Feb 2024 04:00  Patient On (Oxygen Delivery Method): ventilator    O2 Concentration (%): 30      Mode: AC/ CMV (Assist Control/ Continuous Mandatory Ventilation), RR (machine): 12, TV (machine): 450, FiO2: 30, PEEP: 5, MAP: 13, PIP: 37    02-23 @ 07:01  -  02-24 @ 07:00  --------------------------------------------------------  IN: 1250 mL / OUT: 832 mL / NET: 418 mL      CAPILLARY BLOOD GLUCOSE  POCT Blood Glucose.: 99 mg/dL (24 Feb 2024 05:06)      HOSPITAL MEDICATIONS:  MEDICATIONS  (STANDING):  albuterol/ipratropium for Nebulization 3 milliLiter(s) Nebulizer every 6 hours  artificial  tears Solution 1 Drop(s) Left EYE four times a day  aspirin  chewable 81 milliGRAM(s) Enteral Tube daily  chlorhexidine 0.12% Liquid 15 milliLiter(s) Oral Mucosa every 12 hours  chlorhexidine 2% Cloths 1 Application(s) Topical daily  dextrose 5%. 1000 milliLiter(s) (50 mL/Hr) IV Continuous <Continuous>  dextrose 5%. 1000 milliLiter(s) (100 mL/Hr) IV Continuous <Continuous>  dextrose 50% Injectable 25 Gram(s) IV Push once  dextrose 50% Injectable 25 Gram(s) IV Push once  dextrose 50% Injectable 12.5 Gram(s) IV Push once  doxazosin 2 milliGRAM(s) Oral at bedtime  escitalopram Solution 10 milliGRAM(s) Oral daily  glucagon  Injectable 1 milliGRAM(s) IntraMuscular once  heparin   Injectable 5000 Unit(s) SubCutaneous every 8 hours  insulin glargine Injectable (LANTUS) 12 Unit(s) SubCutaneous <User Schedule>  insulin lispro (ADMELOG) corrective regimen sliding scale   SubCutaneous every 6 hours  levETIRAcetam  Solution 500 milliGRAM(s) Oral two times a day  metoprolol tartrate 12.5 milliGRAM(s) Oral every 12 hours  pantoprazole   Suspension 40 milliGRAM(s) Oral every 12 hours  petrolatum Ophthalmic Ointment 1 Application(s) Left EYE at bedtime  sucralfate suspension 1 Gram(s) Enteral Tube two times a day  ticagrelor 60 milliGRAM(s) Oral every 12 hours    MEDICATIONS  (PRN):  dextrose Oral Gel 15 Gram(s) Oral once PRN Blood Glucose LESS THAN 70 milliGRAM(s)/deciliter      PHYSICAL EXAMINATION  General: NAD   HEENT: Trach present   Cards: S1/S2, no murmurs   Pulm: Course vent sounds bilaterally and mildly diminished at bases. No wheezes.   Abdomen: Soft, nondistended and nontender. BS (+) PEG present   Extremities: 1+ pitting edema bilaterally. GEORGE of BL upper > lower extremities with some weakness.   Neurology: Awake and alert, tracks, however does not follow commands likely second to language barrier. Nods and attempts to move extremities when spoken to in native language with severe weakness with no acute focal neurological deficits     LABS:                        8.1    8.38  )-----------( 343      ( 24 Feb 2024 05:13 )             26.7     02-24    148<H>  |  110<H>  |  25<H>  ----------------------------<  95  3.5   |  27  |  1.51<H>    Ca    8.7      24 Feb 2024 05:13  Phos  2.8     02-24  Mg     2.10     02-24        Urinalysis Basic - ( 24 Feb 2024 05:13 )  Color: x / Appearance: x / SG: x / pH: x  Gluc: 95 mg/dL / Ketone: x  / Bili: x / Urobili: x   Blood: x / Protein: x / Nitrite: x   Leuk Esterase: x / RBC: x / WBC x   Sq Epi: x / Non Sq Epi: x / Bacteria: x        Venous Blood Gas:  02-24 @ 05:10  7.45/44/70/31/94.5  VBG Lactate: 1.5  Venous Blood Gas:  02-23 @ 15:30  7.46/43/56/31/86.8  VBG Lactate: 1.4      PAST MEDICAL & SURGICAL HISTORY:  Hyperlipemia      Hypertension      Coronary Artery Disease      Diabetes Mellitus Type II      Stented Coronary Artery  total 5 stents, last stent 5/2019      Neuropathy      Myocardial infarction      Stroke  mild left facial numbness   no other residuals verbalized      Myoclonic jerking      Stage 3 chronic kidney disease      History of Cataract Extraction      Hx of CABG      H/O coronary angiogram      S/P coronary artery stent placement  1/6/09      S/P placement of cardiac pacemaker          FAMILY HISTORY:  No pertinent family history in first degree relatives        Social History:  Lives with family  Ambulates with walker  Denies tobacco, EtOH, or illicit substance use (23 Dec 2023 22:51)      RADIOLOGY:  [ ] Reviewed and interpreted by me    PULMONARY FUNCTION TESTS:    EKG:

## 2024-02-24 NOTE — PROGRESS NOTE ADULT - ASSESSMENT
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 Course ultimately complicated by progressive WOB and O2 demand and patient intubated and transferred to MICU (1/22). Course further complicated by acute cholecystitis and s/p Perc Lynsey with IR on (1/26), HFrEF 38, pulmonary edema, superimposed PNA, failed extubation failed and prolonged vent time and s/p trach (2/13). Patient transferred to RCU (2/16) and s/p PEG (2/20)     NEUROLOGY   # Encephalopathy   - Hx of CVA with no residual deficits per family, however per family worsening confusion at home over the past 6 months (becoming disoriented to time) but prior to admission has been more confused, talking about seeing people that are not present.  - CTH (1/31) with no evidence of acute infarct  - Continue on ASA and brilinta  - Holding home Gabapentin and Memantine in setting of somnolence    # ICA stenosis   - CTA NECK (1/31) with moderate to severe narrowing left internal carotid at the origin. Severe narrowingright internal carotid by a tandem lesion 1.5 cm above the bifurcation with a narrowed internal carotid beyond the lesion. Extensive calcified plaque both cavernous and supraclinoid carotid arteries with narrowing but no occlusion. Mild narrowing proximal right M1 segment. Moderate narrowing both vertebral V4 segments. No perfusion abnormality at the Tmax 6 second threshold, but moderate hypoperfusion in the right MCA territory at the 4 second threshold.   - Continue on ASA and brilinta    # Myoclonic jerks   - Hx of myoclonic jerks post CVAs   - EEG (1/18-2/6) negative for seizures  - Continue on keppra and per neuro wishes to wean, however family wishes to keep current dose as home medication.     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   - Continue on lopressor 12.5mg BID in lieu of home Toprol 25   - AC discussed at length however with recent GIB will hold for now     # HFrEF 38 likely stress induced?   # Mild to moderate AS   - ECHO (12/2023) with EF 62 with normal LVSF and mild to moderate AS   - ECHO (2/2024) with EF 38, moderate LVSD with global LV hypokinesis, mildly reduced RVSF (TAPSE 1.3), mild to moderate MR, mild AR, and moderate AS.   - Continue on lopressor as above and discuss GDMT as BP would tolerate.   - May require intermittently diuresis however appears euvolemic currently.   - Given AS BP control as able.     # CAD with CABG   - CATH (5/2022) with dLAD 70, SVG graft to OM1 with 90 in stent stenosis s/p PCI and GEMMA placement, and LIMA graft to mLAD with no disease.   - Continue on ASA and brilinta    RESPIRATORY   # Acute respiratory failure second to SARS-COV-2 vs pulm edema vs PNA  - Presented with COVID complicated by sepsis second to acute lynsey and worsening respiratory failure second to pulmonary edema requiring intubation.   - Prolonged intubation and s/p tracheostomy (2/13)   - CT CHEST 1/16 with new small bilateral pleural effusions/ atelectasis/ patchy bilateral ground-glass opacities are consistent with pulmonary edema.  - Completed ABX and COVID regimen as below   - Continue on vent and SBT 15/5 as tolerated.   - Continue on nebs and hold further HTS given intermittent hemoptysis  - Continue on diuresis as above    # Mild hemoptysis likely from suction trauma   - s/p bronch (2/18) with no active bleed  - Hemoptysis improved with TXA and holding further HTS as above   - Careful with suctioning     GI  # Nutrition/ Dysphagia   - PEG placed by GI on 2/20 and tube feeds continued.   - Monitor tolerance and BM regimen as needed.     # UGIB   - EGD (1/2) with esophagitis and bleeding Dieulafoy's lesion s/p clips.   - Continue on PPI and carafate     # SMA calcification   - CTA demonstrating mesenteric fat stranding associated with ascending/transverse colon  - Diagnostic laparoscopy (12/24) with small bowel and visible colon viable, some inflammation of omentum in RUQ  - SMA Angiogram and stent placed (12/25).   - Continue on ASA and brilinta     # Acute Cholecystits   - CT (12/26) with distended GB with cholelithiasis and sludge   - HIDA (12/29) POSITIVE for acute cholecystitis   - Case discussed with IR at length and s/p PERC LYNSEY (1/26)   - Completed zosyn course (12/24-12/31)     / RENAL   # CKD   - CRE at baseline   - Monitor renal function and UOP     INFECTIOUS DISEASE   # COVID with superimposed PNA   - COVID POSITIVE (12/23) and completed remdesivir x 1 dose and paxlovid course.   - WOB worsened as above requiring intubation and cultures negative. Zosyn completed empirically however hypothermic (2/11) and BCx, UA, RVP and BAL negative.   - Completed additional zosyn (2/12-2/19) empirically   - Monitor oFF ABX     HEME  # AOCD   - Monitor HH   - DVT PPX with HSQ     ENDOCRINE   # DM2 A1C 9.3   - Continue on lantus 12 and ISS     SKIN/ TUBES   - Trach (2/13)   - PEG (2/20)   - PERC LYNSEY (1/26 - )     ETHICS   - FULL CODE     DISPO - vent facility and family aware. SW aware to send out to facilities and family to discuss on DISPO plan.    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 Course ultimately complicated by progressive WOB and O2 demand and patient intubated and transferred to MICU (1/22). Course further complicated by acute cholecystitis and s/p Perc Lynsey with IR on (1/26), HFrEF 38, pulmonary edema, superimposed PNA, failed extubation failed and prolonged vent time and s/p trach (2/13). Patient transferred to RCU (2/16) and s/p PEG (2/20)     NEUROLOGY   # Encephalopathy   - Hx of CVA with no residual deficits per family, however per family worsening confusion at home over the past 6 months (becoming disoriented to time) but prior to admission has been more confused, talking about seeing people that are not present.  - CTH (1/31) with no evidence of acute infarct  - Continue on ASA and brilinta  - Holding home Gabapentin and Memantine in setting of somnolence    # ICA stenosis   - CTA NECK (1/31) with moderate to severe narrowing left internal carotid at the origin. Severe narrowingright internal carotid by a tandem lesion 1.5 cm above the bifurcation with a narrowed internal carotid beyond the lesion. Extensive calcified plaque both cavernous and supraclinoid carotid arteries with narrowing but no occlusion. Mild narrowing proximal right M1 segment. Moderate narrowing both vertebral V4 segments. No perfusion abnormality at the Tmax 6 second threshold, but moderate hypoperfusion in the right MCA territory at the 4 second threshold.   - Continue on ASA and brilinta    # Myoclonic jerks   - Hx of myoclonic jerks post CVAs   - EEG (1/18-2/6) negative for seizures  - Continue on keppra and per neuro wishes to wean, however family wishes to keep current dose as home medication.     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   - Continue on lopressor 12.5mg BID in lieu of home Toprol 25   - AC discussed at length however with recent GIB will hold for now     # HFrEF 38 likely stress induced?   # Mild to moderate AS   - ECHO (12/2023) with EF 62 with normal LVSF and mild to moderate AS   - ECHO (2/2024) with EF 38, moderate LVSD with global LV hypokinesis, mildly reduced RVSF (TAPSE 1.3), mild to moderate MR, mild AR, and moderate AS.   - Continue on lopressor as above and discuss GDMT as BP would tolerate.   - May require intermittently diuresis however appears euvolemic currently.   - Given AS BP control as able.     # CAD with CABG   - CATH (5/2022) with dLAD 70, SVG graft to OM1 with 90 in stent stenosis s/p PCI and GEMMA placement, and LIMA graft to mLAD with no disease.   - Continue on ASA and brilinta    RESPIRATORY   # Acute respiratory failure second to SARS-COV-2 vs pulm edema vs PNA  - Presented with COVID complicated by sepsis second to acute lynsey and worsening respiratory failure second to pulmonary edema requiring intubation.   - Prolonged intubation and s/p tracheostomy (2/13)   - CT CHEST 1/16 with new small bilateral pleural effusions/ atelectasis/ patchy bilateral ground-glass opacities are consistent with pulmonary edema.  - Completed ABX and COVID regimen as below   - Continue on vent and SBT 15/5 as tolerated.   - Continue on nebs and hold further HTS given intermittent hemoptysis  - Continue on diuresis as above    # Mild hemoptysis likely from suction trauma   - s/p bronch (2/18) with no active bleed  - Hemoptysis improved with TXA and holding further HTS as above   - Careful with suctioning     GI  # Nutrition/ Dysphagia   - PEG placed by GI on 2/20 and tube feeds continued.   - Monitor tolerance and BM regimen as needed.     # UGIB   - EGD (1/2) with esophagitis and bleeding Dieulafoy's lesion s/p clips.   - Continue on PPI and carafate     # SMA calcification   - CTA demonstrating mesenteric fat stranding associated with ascending/transverse colon  - Diagnostic laparoscopy (12/24) with small bowel and visible colon viable, some inflammation of omentum in RUQ  - SMA Angiogram and stent placed (12/25).   - Continue on ASA and brilinta     # Acute Cholecystits   - CT (12/26) with distended GB with cholelithiasis and sludge   - HIDA (12/29) POSITIVE for acute cholecystitis   - Case discussed with IR at length and s/p PERC LYNSEY (1/26)   - Completed zosyn course (12/24-12/31)     / RENAL   # CKD   - CRE at baseline   - Monitor renal function and UOP     INFECTIOUS DISEASE   # COVID with superimposed PNA   - COVID POSITIVE (12/23) and completed remdesivir x 1 dose and paxlovid course.   - WOB worsened as above requiring intubation and cultures negative. Zosyn completed empirically however hypothermic (2/11) and BCx, UA, RVP and BAL negative.   - Completed additional zosyn (2/12-2/19) empirically   - Monitor oFF ABX     HEME  # AOCD   - Monitor HH   - DVT PPX with HSQ     ENDOCRINE   # DM2 A1C 9.3   - Continue on lantus 12 and ISS     SKIN/ TUBES   - Trach (2/13)   - PEG (2/20)   - PERC LYNSEY (1/26 - )     ETHICS   - FULL CODE     DISPO - vent facility and family aware. SW aware to send out to facilities and family to discuss on DISPO plan.   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 Course ultimately complicated by progressive WOB and O2 demand and patient intubated and transferred to MICU (1/22). Course further complicated by acute cholecystitis and s/p Perc Lynsey with IR on (1/26), HFrEF 38, pulmonary edema, superimposed PNA, failed extubation failed and prolonged vent time and s/p trach (2/13). Patient transferred to RCU (2/16) and s/p PEG (2/20)     NEUROLOGY   # Encephalopathy   - Hx of CVA with no residual deficits per family, however per family worsening confusion at home over the past 6 months (becoming disoriented to time) but prior to admission has been more confused, talking about seeing people that are not present.  - CTH (1/31) with no evidence of acute infarct  - Continue on ASA and brilinta  - Holding home Gabapentin and Memantine in setting of somnolence    # ICA stenosis   - CTA NECK (1/31) with moderate to severe narrowing left internal carotid at the origin. Severe narrowingright internal carotid by a tandem lesion 1.5 cm above the bifurcation with a narrowed internal carotid beyond the lesion. Extensive calcified plaque both cavernous and supraclinoid carotid arteries with narrowing but no occlusion. Mild narrowing proximal right M1 segment. Moderate narrowing both vertebral V4 segments. No perfusion abnormality at the Tmax 6 second threshold, but moderate hypoperfusion in the right MCA territory at the 4 second threshold.   - Continue on ASA and brilinta    # Myoclonic jerks   - Hx of myoclonic jerks post CVAs   - EEG (1/18-2/6) negative for seizures  - Continue on keppra and per neuro wishes to wean, however family wishes to keep current dose as home medication.     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   - Continue on lopressor 12.5mg BID however replaced with coreg 6.25 second to HFrEF   - AC discussed at length however with recent GIB will hold for now     # HFrEF 38 likely stress induced?   # Mild to moderate AS   - ECHO (12/2023) with EF 62 with normal LVSF and mild to moderate AS   - ECHO (2/2024) with EF 38, moderate LVSD with global LV hypokinesis, mildly reduced RVSF (TAPSE 1.3), mild to moderate MR, mild AR, and moderate AS.   - Continue on coreg 6.25 and uptitrate as able   - Continue on intermittent diuresis (lasix 40mg IVP given 2/24)  - Given moderate AS monitor BP closely   - Discuss further optimization of GDMT      # CAD with CABG   - CATH (5/2022) with dLAD 70, SVG graft to OM1 with 90 in stent stenosis s/p PCI and GEMMA placement, and LIMA graft to mLAD with no disease.   - Continue on ASA and brilinta    RESPIRATORY   # Acute respiratory failure second to SARS-COV-2 vs pulm edema vs PNA  - Presented with COVID complicated by sepsis second to acute lynsey and worsening respiratory failure second to pulmonary edema requiring intubation.   - Prolonged intubation and s/p tracheostomy (2/13)   - CT CHEST 1/16 with new small bilateral pleural effusions/ atelectasis/ patchy bilateral ground-glass opacities are consistent with pulmonary edema.  - Completed ABX and COVID regimen as below   - Continue on vent and initially tolerates SBT 15/5 however now with poor tolerance second to weakness vs volume   - Continue on nebs and hold further HTS given intermittent hemoptysis  - Continue on diuresis as above    # Mild hemoptysis likely from suction trauma   - s/p bronch (2/18) with no active bleed  - Hemoptysis improved with TXA and holding further HTS as above   - Tiny hemoptysis returned today second to suction trauma   - Careful with suctioning     HEENT   # L eye subconjunctival hemorrhage   - Continue on artifical tears and lacrilube   - Optho eval appreciated     GI  # Nutrition/ Dysphagia   - PEG placed by GI on 2/20 and tube feeds continued.   - Loose stools and banatrol continued     # UGIB   - EGD (1/2) with esophagitis and bleeding Dieulafoy's lesion s/p clips.   - Continue on PPI and carafate     # SMA calcification   - CTA demonstrating mesenteric fat stranding associated with ascending/transverse colon  - Diagnostic laparoscopy (12/24) with small bowel and visible colon viable, some inflammation of omentum in RUQ  - SMA Angiogram and stent placed (12/25).   - Continue on ASA and brilinta     # Acute Cholecystits   - CT (12/26) with distended GB with cholelithiasis and sludge   - HIDA (12/29) POSITIVE for acute cholecystitis   - Case discussed with IR at length and s/p PERC LYNSEY (1/26)   - Completed zosyn course (12/24-12/31)     / RENAL   # CKD   - CRE at baseline   - Monitor renal function and UOP     INFECTIOUS DISEASE   # COVID with superimposed PNA   - COVID POSITIVE (12/23) and completed remdesivir x 1 dose and paxlovid course.   - WOB worsened as above requiring intubation and cultures negative. Zosyn completed empirically however hypothermic (2/11) and BCx, UA, RVP and BAL negative.   - Completed additional zosyn (2/12-2/19) empirically   - Monitor oFF ABX     HEME  # AOCD   - Monitor HH   - DVT PPX with HSQ     ENDOCRINE   # DM2 A1C 9.3   - Continue on lantus 12 and ISS     SKIN/ TUBES   - Trach (2/13)   - PEG (2/20)   - PERC LYNSEY (1/26 - )     ETHICS   - FULL CODE     DISPO - vent facility and family aware. SW aware to send out to facilities and family to discuss on DISPO plan.   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 Course ultimately complicated by progressive WOB and O2 demand and patient intubated and transferred to MICU (1/22). Course further complicated by acute cholecystitis and s/p Perc Lynsey with IR on (1/26), HFrEF 38, pulmonary edema, superimposed PNA, failed extubation failed and prolonged vent time and s/p trach (2/13). Patient transferred to RCU (2/16) and s/p PEG (2/20)     NEUROLOGY   # Encephalopathy   - Hx of CVA with no residual deficits per family, however per family worsening confusion at home over the past 6 months (becoming disoriented to time) but prior to admission has been more confused, talking about seeing people that are not present.  - CTH (1/31) with no evidence of acute infarct  - Continue on ASA and brilinta  - Holding home Gabapentin and Memantine in setting of somnolence    # ICA stenosis   - CTA NECK (1/31) with moderate to severe narrowing left internal carotid at the origin. Severe narrowingright internal carotid by a tandem lesion 1.5 cm above the bifurcation with a narrowed internal carotid beyond the lesion. Extensive calcified plaque both cavernous and supraclinoid carotid arteries with narrowing but no occlusion. Mild narrowing proximal right M1 segment. Moderate narrowing both vertebral V4 segments. No perfusion abnormality at the Tmax 6 second threshold, but moderate hypoperfusion in the right MCA territory at the 4 second threshold.   - Continue on ASA and brilinta    # Myoclonic jerks   - Hx of myoclonic jerks post CVAs   - EEG (1/18-2/6) negative for seizures  - Continue on keppra and per neuro wishes to wean, however family wishes to keep current dose as home medication.     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   - Continue on lopressor 12.5mg BID however replaced with coreg 6.25 second to HFrEF   - AC discussed at length however with recent GIB will hold for now     # HFrEF 38 likely stress induced?   # Mild to moderate AS   - ECHO (12/2023) with EF 62 with normal LVSF and mild to moderate AS   - ECHO (2/2024) with EF 38, moderate LVSD with global LV hypokinesis, mildly reduced RVSF (TAPSE 1.3), mild to moderate MR, mild AR, and moderate AS.   - Continue on coreg 6.25 and uptitrate as able   - Continue on intermittent diuresis (lasix 40mg IVP given 2/24)  - Given moderate AS monitor BP closely   - Discuss further optimization of GDMT      # CAD with CABG   - CATH (5/2022) with dLAD 70, SVG graft to OM1 with 90 in stent stenosis s/p PCI and GEMMA placement, and LIMA graft to mLAD with no disease.   - Continue on ASA and brilinta    RESPIRATORY   # Acute respiratory failure second to SARS-COV-2 vs pulm edema vs PNA  - Presented with COVID complicated by sepsis second to acute lynsey and worsening respiratory failure second to pulmonary edema requiring intubation.   - Prolonged intubation and s/p tracheostomy (2/13)   - CT CHEST 1/16 with new small bilateral pleural effusions/ atelectasis/ patchy bilateral ground-glass opacities are consistent with pulmonary edema.  - Completed ABX and COVID regimen as below   - Continue on vent and initially tolerates SBT 15/5 however now with poor tolerance second to weakness vs volume   - Continue on nebs and hold further HTS given intermittent hemoptysis  - Continue on diuresis as above    # Mild hemoptysis likely from suction trauma   - s/p bronch (2/18) with no active bleed  - Hemoptysis improved with TXA and holding further HTS as above   - Tiny hemoptysis returned today second to suction trauma   - Careful with suctioning     HEENT   # L eye subconjunctival hemorrhage   - Continue on artifical tears and lacrilube   - Optho eval appreciated     GI  # Nutrition/ Dysphagia   - PEG placed by GI on 2/20 and tube feeds continued.   - Loose stools and banatrol continued     # UGIB   - EGD (1/2) with esophagitis and bleeding Dieulafoy's lesion s/p clips.   - Continue on PPI and carafate     # SMA calcification   - CTA demonstrating mesenteric fat stranding associated with ascending/transverse colon  - Diagnostic laparoscopy (12/24) with small bowel and visible colon viable, some inflammation of omentum in RUQ  - SMA Angiogram and stent placed (12/25).   - Continue on ASA and brilinta     # Acute Cholecystits   - CT (12/26) with distended GB with cholelithiasis and sludge   - HIDA (12/29) POSITIVE for acute cholecystitis   - Case discussed with IR at length and s/p PERC LYNSEY (1/26)   - Completed zosyn course (12/24-12/31)     / RENAL   # CKD   - CRE at baseline   - Monitor renal function and UOP     # Hypernatremia   - Free water deficit 1.7L and  Q6H added for now   - Monitor closely with diuresis as above     INFECTIOUS DISEASE   # COVID with superimposed PNA   - COVID POSITIVE (12/23) and completed remdesivir x 1 dose and paxlovid course.   - WOB worsened as above requiring intubation and cultures negative. Zosyn completed empirically however hypothermic (2/11) and BCx, UA, RVP and BAL negative.   - Completed additional zosyn (2/12-2/19) empirically   - Monitor oFF ABX     HEME  # AOCD   - Monitor HH   - DVT PPX with HSQ     ENDOCRINE   # DM2 A1C 9.3   - Continue on lantus 12 and ISS     SKIN/ TUBES   - Trach (2/13)   - PEG (2/20)   - PERC LYNSEY (1/26 - )     ETHICS   - FULL CODE     DISPO - vent facility and family aware. SW aware to send out to facilities and family to discuss on DISPO plan.   *********************  2/24-no new events

## 2024-02-24 NOTE — CHART NOTE - NSCHARTNOTEFT_GEN_A_CORE
RCU PA NOTE     Procedure: Tracheostomy suture removal     Seen by bedside and tracheostomy clean, dry and intact, and 2/4 sutures present. Chart reviewed and given POD 12 decision made to remove remaining sutures per protocol. Patient placed supine and pillow removed from behind head for optimal position. Sutures clipped and accounted for. All sharps placed in sharps container procedure. Pillow replaced and patient's HOB increased back to 30-45 degrees. RN notified.       ALISTAIR Thomas, PA-C  Department of Medicine/ RCU  In house [facility]CU [facility] [id number]  In house Medicine Beeper [phone]  Reachable via teams

## 2024-02-24 NOTE — PROGRESS NOTE ADULT - NS ATTEND AMEND GEN_ALL_CORE FT
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 Course c/b acute cholecystitis s/p perc asa 1/26 by IR, also with recent SMA stent.     Patient failed trial of extubation, also possible sz. Now s/p tracheostomy and PEG. EEG without sz. Currently on Keppra. S/P another course of abx for fevers and hypotension.     PPM interrogation with pAFIB.    #Acute hypoxemic/hypercapnic respiratory failure  #Multifocal Pneumonia  #Dysphagia  #Acute cholecystitis  #Encephalopathy  #Afib  #SMA stent   #Petechial hemorrhages  - Frequent aspiration noted clinically and on CT scans. now reintubated, unable to clear secretions. 2/2 to poor cough and mental status. Now s/p tracheostomy.   - EEG now without s/w keppra 500.. MRI without acute findings.   - S/P course of antibiotics for MSSA pna as well as aspiration. Continue to monitor off abx.  - C/W vent, adjust based on gas, PS as tolerated.  - S/P IR drainage of biliary tube, monitor output. Reconsult IR closer to d/c if drain should remain in place.   - new AFib, seen on interrogation, will f/u with family risk and benefits. Per vascular would want to c/w DAPT for stent. D/W family. Will think about a/c after restarting Brilinta  - C/W ASA, Brilinta. per GI can restart on 2/23.   - Seen by optho, continue to monitor  - Lasix PRN to maintain euvolemia.   - S/P PEG now on tube feeds.   - DVT ppx - SQH  - Dispo- full code. 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 Course c/b acute cholecystitis s/p perc asa 1/26 by IR, also with recent SMA stent.     Patient failed trial of extubation, also possible sz. Now s/p tracheostomy and PEG. EEG without sz. Currently on Keppra. S/P another course of abx for fevers and hypotension.     PPM interrogation with pAFIB.    #Acute hypoxemic/hypercapnic respiratory failure  #Multifocal Pneumonia  #Dysphagia  #Acute cholecystitis  #Encephalopathy  #Afib  #SMA stent   #Petechial hemorrhages  - Frequent aspiration noted clinically and on CT scans. now reintubated, unable to clear secretions. 2/2 to poor cough and mental status. Now s/p tracheostomy.   - EEG now without s/w keppra 500.. MRI without acute findings.   - S/P course of antibiotics for MSSA pna as well as aspiration. Continue to monitor off abx.  - C/W vent, adjust based on gas, PS as tolerated.  - S/P IR drainage of biliary tube, monitor output. Reconsult IR closer to d/c if drain should remain in place.   -Cards- new AFib, seen on interrogation, will f/u with family risk and benefits. Per vascular would want to c/w DAPT for stent. D/W family. Will think about a/c after restarting Brilinta - C/W ASA, Brilinta. per GI-restarted on 2/23.   - Seen by optho, continue to monitor  FEN- Lasix PRN to maintain euvolemia.   GI- S/P PEG now on tube feeds.   - DVT ppx - SQH  - Dispo- full code.  Candelario Johnson MD-Pulmonary   [phone]

## 2024-02-25 LAB
ANION GAP SERPL CALC-SCNC: 14 MMOL/L — SIGNIFICANT CHANGE UP (ref 7–14)
BUN SERPL-MCNC: 30 MG/DL — HIGH (ref 7–23)
CALCIUM SERPL-MCNC: 8.3 MG/DL — LOW (ref 8.4–10.5)
CHLORIDE SERPL-SCNC: 107 MMOL/L — SIGNIFICANT CHANGE UP (ref 98–107)
CO2 SERPL-SCNC: 24 MMOL/L — SIGNIFICANT CHANGE UP (ref 22–31)
CREAT SERPL-MCNC: 1.52 MG/DL — HIGH (ref 0.5–1.3)
EGFR: 43 ML/MIN/1.73M2 — LOW
GLUCOSE BLDC GLUCOMTR-MCNC: 173 MG/DL — HIGH (ref 70–99)
GLUCOSE BLDC GLUCOMTR-MCNC: 207 MG/DL — HIGH (ref 70–99)
GLUCOSE BLDC GLUCOMTR-MCNC: 237 MG/DL — HIGH (ref 70–99)
GLUCOSE BLDC GLUCOMTR-MCNC: 265 MG/DL — HIGH (ref 70–99)
GLUCOSE SERPL-MCNC: 205 MG/DL — HIGH (ref 70–99)
HCT VFR BLD CALC: 29.5 % — LOW (ref 39–50)
HGB BLD-MCNC: 8.6 G/DL — LOW (ref 13–17)
MAGNESIUM SERPL-MCNC: 2.1 MG/DL — SIGNIFICANT CHANGE UP (ref 1.6–2.6)
MCHC RBC-ENTMCNC: 29.2 GM/DL — LOW (ref 32–36)
MCHC RBC-ENTMCNC: 29.4 PG — SIGNIFICANT CHANGE UP (ref 27–34)
MCV RBC AUTO: 100.7 FL — HIGH (ref 80–100)
NRBC # BLD: 0 /100 WBCS — SIGNIFICANT CHANGE UP (ref 0–0)
NRBC # FLD: 0 K/UL — SIGNIFICANT CHANGE UP (ref 0–0)
PHOSPHATE SERPL-MCNC: 3 MG/DL — SIGNIFICANT CHANGE UP (ref 2.5–4.5)
PLATELET # BLD AUTO: 303 K/UL — SIGNIFICANT CHANGE UP (ref 150–400)
POTASSIUM SERPL-MCNC: 4 MMOL/L — SIGNIFICANT CHANGE UP (ref 3.5–5.3)
POTASSIUM SERPL-SCNC: 4 MMOL/L — SIGNIFICANT CHANGE UP (ref 3.5–5.3)
RBC # BLD: 2.93 M/UL — LOW (ref 4.2–5.8)
RBC # FLD: 17.3 % — HIGH (ref 10.3–14.5)
SODIUM SERPL-SCNC: 145 MMOL/L — SIGNIFICANT CHANGE UP (ref 135–145)
WBC # BLD: 6.84 K/UL — SIGNIFICANT CHANGE UP (ref 3.8–10.5)
WBC # FLD AUTO: 6.84 K/UL — SIGNIFICANT CHANGE UP (ref 3.8–10.5)

## 2024-02-25 PROCEDURE — 99232 SBSQ HOSP IP/OBS MODERATE 35: CPT

## 2024-02-25 RX ORDER — LANOLIN ALCOHOL/MO/W.PET/CERES
6 CREAM (GRAM) TOPICAL AT BEDTIME
Refills: 0 | Status: DISCONTINUED | OUTPATIENT
Start: 2024-02-25 | End: 2024-02-25

## 2024-02-25 RX ORDER — FUROSEMIDE 40 MG
40 TABLET ORAL ONCE
Refills: 0 | Status: COMPLETED | OUTPATIENT
Start: 2024-02-25 | End: 2024-02-25

## 2024-02-25 RX ORDER — HYDRALAZINE HCL 50 MG
10 TABLET ORAL EVERY 8 HOURS
Refills: 0 | Status: DISCONTINUED | OUTPATIENT
Start: 2024-02-25 | End: 2024-03-02

## 2024-02-25 RX ORDER — LANOLIN ALCOHOL/MO/W.PET/CERES
6 CREAM (GRAM) TOPICAL AT BEDTIME
Refills: 0 | Status: DISCONTINUED | OUTPATIENT
Start: 2024-02-25 | End: 2024-03-27

## 2024-02-25 RX ADMIN — LEVETIRACETAM 500 MILLIGRAM(S): 250 TABLET, FILM COATED ORAL at 17:35

## 2024-02-25 RX ADMIN — PANTOPRAZOLE SODIUM 40 MILLIGRAM(S): 20 TABLET, DELAYED RELEASE ORAL at 17:34

## 2024-02-25 RX ADMIN — Medication 6 MILLIGRAM(S): at 21:25

## 2024-02-25 RX ADMIN — Medication 1 DROP(S): at 17:34

## 2024-02-25 RX ADMIN — CHLORHEXIDINE GLUCONATE 15 MILLILITER(S): 213 SOLUTION TOPICAL at 17:34

## 2024-02-25 RX ADMIN — HEPARIN SODIUM 5000 UNIT(S): 5000 INJECTION INTRAVENOUS; SUBCUTANEOUS at 13:59

## 2024-02-25 RX ADMIN — Medication 40 MILLIGRAM(S): at 11:33

## 2024-02-25 RX ADMIN — LEVETIRACETAM 500 MILLIGRAM(S): 250 TABLET, FILM COATED ORAL at 05:52

## 2024-02-25 RX ADMIN — TICAGRELOR 60 MILLIGRAM(S): 90 TABLET ORAL at 05:56

## 2024-02-25 RX ADMIN — Medication 1 DROP(S): at 11:34

## 2024-02-25 RX ADMIN — Medication 81 MILLIGRAM(S): at 11:35

## 2024-02-25 RX ADMIN — Medication 6: at 17:35

## 2024-02-25 RX ADMIN — Medication 2: at 11:34

## 2024-02-25 RX ADMIN — Medication 10 MILLIGRAM(S): at 13:59

## 2024-02-25 RX ADMIN — CARVEDILOL PHOSPHATE 6.25 MILLIGRAM(S): 80 CAPSULE, EXTENDED RELEASE ORAL at 06:13

## 2024-02-25 RX ADMIN — CHLORHEXIDINE GLUCONATE 15 MILLILITER(S): 213 SOLUTION TOPICAL at 05:52

## 2024-02-25 RX ADMIN — CHLORHEXIDINE GLUCONATE 1 APPLICATION(S): 213 SOLUTION TOPICAL at 11:35

## 2024-02-25 RX ADMIN — INSULIN GLARGINE 12 UNIT(S): 100 INJECTION, SOLUTION SUBCUTANEOUS at 11:34

## 2024-02-25 RX ADMIN — PANTOPRAZOLE SODIUM 40 MILLIGRAM(S): 20 TABLET, DELAYED RELEASE ORAL at 05:55

## 2024-02-25 RX ADMIN — Medication 1 DROP(S): at 06:13

## 2024-02-25 RX ADMIN — Medication 3 MILLILITER(S): at 21:30

## 2024-02-25 RX ADMIN — Medication 3 MILLILITER(S): at 03:39

## 2024-02-25 RX ADMIN — Medication 3 MILLILITER(S): at 10:10

## 2024-02-25 RX ADMIN — Medication 3 MILLILITER(S): at 17:13

## 2024-02-25 RX ADMIN — TICAGRELOR 60 MILLIGRAM(S): 90 TABLET ORAL at 17:35

## 2024-02-25 RX ADMIN — Medication 1 APPLICATION(S): at 21:26

## 2024-02-25 RX ADMIN — Medication 4: at 00:25

## 2024-02-25 RX ADMIN — ESCITALOPRAM OXALATE 10 MILLIGRAM(S): 10 TABLET, FILM COATED ORAL at 11:35

## 2024-02-25 RX ADMIN — Medication 1 DROP(S): at 00:46

## 2024-02-25 RX ADMIN — HEPARIN SODIUM 5000 UNIT(S): 5000 INJECTION INTRAVENOUS; SUBCUTANEOUS at 06:13

## 2024-02-25 RX ADMIN — HEPARIN SODIUM 5000 UNIT(S): 5000 INJECTION INTRAVENOUS; SUBCUTANEOUS at 21:27

## 2024-02-25 RX ADMIN — Medication 10 MILLIGRAM(S): at 21:29

## 2024-02-25 RX ADMIN — Medication 4: at 23:34

## 2024-02-25 RX ADMIN — Medication 2 MILLIGRAM(S): at 21:26

## 2024-02-25 RX ADMIN — CARVEDILOL PHOSPHATE 6.25 MILLIGRAM(S): 80 CAPSULE, EXTENDED RELEASE ORAL at 17:33

## 2024-02-25 RX ADMIN — Medication 1 GRAM(S): at 17:34

## 2024-02-25 RX ADMIN — Medication 1 DROP(S): at 23:35

## 2024-02-25 RX ADMIN — Medication 1 GRAM(S): at 05:56

## 2024-02-25 RX ADMIN — Medication 4: at 06:15

## 2024-02-25 NOTE — PROGRESS NOTE ADULT - SUBJECTIVE AND OBJECTIVE BOX
NEPHROLOGY     Patient seen and examined with family at bedside, pt trach to shalini, in no acute distress.     MEDICATIONS  (STANDING):  albuterol/ipratropium for Nebulization 3 milliLiter(s) Nebulizer every 6 hours  artificial  tears Solution 1 Drop(s) Left EYE four times a day  aspirin  chewable 81 milliGRAM(s) Enteral Tube daily  carvedilol 6.25 milliGRAM(s) Oral every 12 hours  chlorhexidine 0.12% Liquid 15 milliLiter(s) Oral Mucosa every 12 hours  chlorhexidine 2% Cloths 1 Application(s) Topical daily  dextrose 5%. 1000 milliLiter(s) (50 mL/Hr) IV Continuous <Continuous>  dextrose 5%. 1000 milliLiter(s) (100 mL/Hr) IV Continuous <Continuous>  dextrose 50% Injectable 25 Gram(s) IV Push once  dextrose 50% Injectable 12.5 Gram(s) IV Push once  dextrose 50% Injectable 25 Gram(s) IV Push once  doxazosin 2 milliGRAM(s) Oral at bedtime  escitalopram Solution 10 milliGRAM(s) Oral daily  furosemide   Injectable 40 milliGRAM(s) IV Push once  glucagon  Injectable 1 milliGRAM(s) IntraMuscular once  heparin   Injectable 5000 Unit(s) SubCutaneous every 8 hours  hydrALAZINE 10 milliGRAM(s) Oral every 8 hours  insulin glargine Injectable (LANTUS) 12 Unit(s) SubCutaneous <User Schedule>  insulin lispro (ADMELOG) corrective regimen sliding scale   SubCutaneous every 6 hours  levETIRAcetam  Solution 500 milliGRAM(s) Oral two times a day  pantoprazole   Suspension 40 milliGRAM(s) Oral every 12 hours  petrolatum Ophthalmic Ointment 1 Application(s) Left EYE at bedtime  sucralfate suspension 1 Gram(s) Enteral Tube two times a day  ticagrelor 60 milliGRAM(s) Oral every 12 hours    VITALS:  T(C): , Max: 37.2 (02-24-24 @ 12:05)  T(F): , Max: 98.9 (02-24-24 @ 12:05)  HR: 98 (02-25-24 @ 06:00)  BP: 138/68 (02-25-24 @ 06:00)  RR: 17 (02-24-24 @ 16:00)  SpO2: 97% (02-25-24 @ 03:39)    I and O's:    02-24 @ 07:01  -  02-25 @ 07:00  --------------------------------------------------------  IN: 2080 mL / OUT: 2225 mL / NET: -145 mL    PHYSICAL EXAM:  Constitutional: NAD trach to vent   HEENT: +trach  Respiratory: coarse bs   Cardiovascular: tachy s1s2  Gastrointestinal: BS+, soft, NT/ND +PEG  Extremities:+ peripheral edema  :  + external catheter   Skin: No rashes    LABS:                        8.6    6.84  )-----------( 303      ( 25 Feb 2024 06:17 )             29.5     02-25    145  |  107  |  30<H>  ----------------------------<  205<H>  4.0   |  24  |  1.52<H>    Ca    8.3<L>      25 Feb 2024 06:17  Phos  3.0     02-25  Mg     2.10     02-25

## 2024-02-25 NOTE — PROGRESS NOTE ADULT - SUBJECTIVE AND OBJECTIVE BOX
CHIEF COMPLAINT: Patient is a 89y old  Male who presents with a chief complaint of COVID19, Chest pain (24 Feb 2024 08:21)      INTERVAL EVENTS:    REVIEW OF SYSTEMS: Seen by bedside during AM rounds and     Mode: AC/ CMV (Assist Control/ Continuous Mandatory Ventilation), RR (machine): 12, TV (machine): 450, FiO2: 30, PEEP: 5, ITime: 0.73, MAP: 12, PIP: 38      OBJECTIVE:  ICU Vital Signs Last 24 Hrs  T(C): 36.9 (24 Feb 2024 16:00), Max: 37.2 (24 Feb 2024 12:05)  T(F): 98.5 (24 Feb 2024 16:00), Max: 98.9 (24 Feb 2024 12:05)  HR: 98 (25 Feb 2024 06:00) (97 - 104)  BP: 138/68 (25 Feb 2024 06:00) (137/55 - 148/63)  BP(mean): --  ABP: --  ABP(mean): --  RR: 17 (24 Feb 2024 16:00) (17 - 17)  SpO2: 97% (25 Feb 2024 03:39) (96% - 99%)    O2 Parameters below as of 25 Feb 2024 03:39  Patient On (Oxygen Delivery Method): ventilator          Mode: AC/ CMV (Assist Control/ Continuous Mandatory Ventilation), RR (machine): 12, TV (machine): 450, FiO2: 30, PEEP: 5, ITime: 0.73, MAP: 12, PIP: 38    02-24 @ 07:01  -  02-25 @ 07:00  --------------------------------------------------------  IN: 2080 mL / OUT: 2225 mL / NET: -145 mL      CAPILLARY BLOOD GLUCOSE      POCT Blood Glucose.: 207 mg/dL (25 Feb 2024 06:09)      HOSPITAL MEDICATIONS:  MEDICATIONS  (STANDING):  albuterol/ipratropium for Nebulization 3 milliLiter(s) Nebulizer every 6 hours  artificial  tears Solution 1 Drop(s) Left EYE four times a day  aspirin  chewable 81 milliGRAM(s) Enteral Tube daily  carvedilol 6.25 milliGRAM(s) Oral every 12 hours  chlorhexidine 0.12% Liquid 15 milliLiter(s) Oral Mucosa every 12 hours  chlorhexidine 2% Cloths 1 Application(s) Topical daily  dextrose 5%. 1000 milliLiter(s) (50 mL/Hr) IV Continuous <Continuous>  dextrose 5%. 1000 milliLiter(s) (100 mL/Hr) IV Continuous <Continuous>  dextrose 50% Injectable 25 Gram(s) IV Push once  dextrose 50% Injectable 12.5 Gram(s) IV Push once  dextrose 50% Injectable 25 Gram(s) IV Push once  doxazosin 2 milliGRAM(s) Oral at bedtime  escitalopram Solution 10 milliGRAM(s) Oral daily  glucagon  Injectable 1 milliGRAM(s) IntraMuscular once  heparin   Injectable 5000 Unit(s) SubCutaneous every 8 hours  insulin glargine Injectable (LANTUS) 12 Unit(s) SubCutaneous <User Schedule>  insulin lispro (ADMELOG) corrective regimen sliding scale   SubCutaneous every 6 hours  levETIRAcetam  Solution 500 milliGRAM(s) Oral two times a day  pantoprazole   Suspension 40 milliGRAM(s) Oral every 12 hours  petrolatum Ophthalmic Ointment 1 Application(s) Left EYE at bedtime  sucralfate suspension 1 Gram(s) Enteral Tube two times a day  ticagrelor 60 milliGRAM(s) Oral every 12 hours    MEDICATIONS  (PRN):  acetaminophen   Oral Liquid .. 650 milliGRAM(s) Oral every 6 hours PRN Temp greater or equal to 38C (100.4F), Mild Pain (1 - 3), Moderate Pain (4 - 6)  dextrose Oral Gel 15 Gram(s) Oral once PRN Blood Glucose LESS THAN 70 milliGRAM(s)/deciliter      PHYSICAL EXAMINATION    LABS:                        8.6    6.84  )-----------( 303      ( 25 Feb 2024 06:17 )             29.5     02-25    145  |  107  |  30<H>  ----------------------------<  205<H>  4.0   |  24  |  1.52<H>    Ca    8.3<L>      25 Feb 2024 06:17  Phos  3.0     02-25  Mg     2.10     02-25        Urinalysis Basic - ( 25 Feb 2024 06:17 )    Color: x / Appearance: x / SG: x / pH: x  Gluc: 205 mg/dL / Ketone: x  / Bili: x / Urobili: x   Blood: x / Protein: x / Nitrite: x   Leuk Esterase: x / RBC: x / WBC x   Sq Epi: x / Non Sq Epi: x / Bacteria: x        Venous Blood Gas:  02-24 @ 05:10  7.45/44/70/31/94.5  VBG Lactate: 1.5  Venous Blood Gas:  02-23 @ 15:30  7.46/43/56/31/86.8  VBG Lactate: 1.4      PAST MEDICAL & SURGICAL HISTORY:  Hyperlipemia      Hypertension      Coronary Artery Disease      Diabetes Mellitus Type II      Stented Coronary Artery  total 5 stents, last stent 5/2019      Neuropathy      Myocardial infarction      Stroke  mild left facial numbness   no other residuals verbalized      Myoclonic jerking      Stage 3 chronic kidney disease      History of Cataract Extraction      Hx of CABG      H/O coronary angiogram      S/P coronary artery stent placement  1/6/09      S/P placement of cardiac pacemaker          FAMILY HISTORY:  No pertinent family history in first degree relatives        Social History:  Lives with family  Ambulates with walker  Denies tobacco, EtOH, or illicit substance use (23 Dec 2023 22:51)      RADIOLOGY:  [ ] Reviewed and interpreted by me    PULMONARY FUNCTION TESTS:    EKG: CHIEF COMPLAINT: Patient is a 89y old  Male who presents with a chief complaint of COVID19, Chest pain (24 Feb 2024 08:21)      INTERVAL EVENTS:  - No interval events overnight   - Not tolerating PS and CXR with worsening pleural effusion/ pulmonary edema   - Continue on diuresis by dose   - GDMT optimized with hydral   - Insomnia and melatonin added   - Hemoptysis improving today     REVIEW OF SYSTEMS: Seen by bedside during AM rounds and unable to assess ROS as vented/ language barrier     Mode: AC/ CMV (Assist Control/ Continuous Mandatory Ventilation), RR (machine): 12, TV (machine): 450, FiO2: 30, PEEP: 5, ITime: 0.73, MAP: 12, PIP: 38      OBJECTIVE:  ICU Vital Signs Last 24 Hrs  T(C): 36.9 (24 Feb 2024 16:00), Max: 37.2 (24 Feb 2024 12:05)  T(F): 98.5 (24 Feb 2024 16:00), Max: 98.9 (24 Feb 2024 12:05)  HR: 98 (25 Feb 2024 06:00) (97 - 104)  BP: 138/68 (25 Feb 2024 06:00) (137/55 - 148/63)  BP(mean): --  ABP: --  ABP(mean): --  RR: 17 (24 Feb 2024 16:00) (17 - 17)  SpO2: 97% (25 Feb 2024 03:39) (96% - 99%)    O2 Parameters below as of 25 Feb 2024 03:39  Patient On (Oxygen Delivery Method): ventilator          Mode: AC/ CMV (Assist Control/ Continuous Mandatory Ventilation), RR (machine): 12, TV (machine): 450, FiO2: 30, PEEP: 5, ITime: 0.73, MAP: 12, PIP: 38    02-24 @ 07:01  -  02-25 @ 07:00  --------------------------------------------------------  IN: 2080 mL / OUT: 2225 mL / NET: -145 mL      CAPILLARY BLOOD GLUCOSE  POCT Blood Glucose.: 207 mg/dL (25 Feb 2024 06:09)      HOSPITAL MEDICATIONS:  MEDICATIONS  (STANDING):  albuterol/ipratropium for Nebulization 3 milliLiter(s) Nebulizer every 6 hours  artificial  tears Solution 1 Drop(s) Left EYE four times a day  aspirin  chewable 81 milliGRAM(s) Enteral Tube daily  carvedilol 6.25 milliGRAM(s) Oral every 12 hours  chlorhexidine 0.12% Liquid 15 milliLiter(s) Oral Mucosa every 12 hours  chlorhexidine 2% Cloths 1 Application(s) Topical daily  dextrose 5%. 1000 milliLiter(s) (50 mL/Hr) IV Continuous <Continuous>  dextrose 5%. 1000 milliLiter(s) (100 mL/Hr) IV Continuous <Continuous>  dextrose 50% Injectable 25 Gram(s) IV Push once  dextrose 50% Injectable 12.5 Gram(s) IV Push once  dextrose 50% Injectable 25 Gram(s) IV Push once  doxazosin 2 milliGRAM(s) Oral at bedtime  escitalopram Solution 10 milliGRAM(s) Oral daily  glucagon  Injectable 1 milliGRAM(s) IntraMuscular once  heparin   Injectable 5000 Unit(s) SubCutaneous every 8 hours  insulin glargine Injectable (LANTUS) 12 Unit(s) SubCutaneous <User Schedule>  insulin lispro (ADMELOG) corrective regimen sliding scale   SubCutaneous every 6 hours  levETIRAcetam  Solution 500 milliGRAM(s) Oral two times a day  pantoprazole   Suspension 40 milliGRAM(s) Oral every 12 hours  petrolatum Ophthalmic Ointment 1 Application(s) Left EYE at bedtime  sucralfate suspension 1 Gram(s) Enteral Tube two times a day  ticagrelor 60 milliGRAM(s) Oral every 12 hours    MEDICATIONS  (PRN):  acetaminophen   Oral Liquid .. 650 milliGRAM(s) Oral every 6 hours PRN Temp greater or equal to 38C (100.4F), Mild Pain (1 - 3), Moderate Pain (4 - 6)  dextrose Oral Gel 15 Gram(s) Oral once PRN Blood Glucose LESS THAN 70 milliGRAM(s)/deciliter      PHYSICAL EXAMINATION  General: NAD   HEENT: Trach present   Cards: S1/S2, no murmurs   Pulm: Course vent sounds bilaterally and mildly diminished at bases. No wheezes.   Abdomen: Soft, nondistended and nontender. BS (+) PEG present   Extremities: 1+ pitting edema bilaterally. GEORGE of BL upper > lower extremities with some weakness.   Neurology: Awake and alert, tracks, however does not follow commands likely second to language barrier. Nods and attempts to move extremities when spoken to in native language with severe weakness with no acute focal neurological deficits     LABS:                        8.6    6.84  )-----------( 303      ( 25 Feb 2024 06:17 )             29.5     02-25    145  |  107  |  30<H>  ----------------------------<  205<H>  4.0   |  24  |  1.52<H>    Ca    8.3<L>      25 Feb 2024 06:17  Phos  3.0     02-25  Mg     2.10     02-25        Urinalysis Basic - ( 25 Feb 2024 06:17 )  Color: x / Appearance: x / SG: x / pH: x  Gluc: 205 mg/dL / Ketone: x  / Bili: x / Urobili: x   Blood: x / Protein: x / Nitrite: x   Leuk Esterase: x / RBC: x / WBC x   Sq Epi: x / Non Sq Epi: x / Bacteria: x        Venous Blood Gas:  02-24 @ 05:10  7.45/44/70/31/94.5  VBG Lactate: 1.5  Venous Blood Gas:  02-23 @ 15:30  7.46/43/56/31/86.8  VBG Lactate: 1.4      PAST MEDICAL & SURGICAL HISTORY:  Hyperlipemia      Hypertension      Coronary Artery Disease      Diabetes Mellitus Type II      Stented Coronary Artery  total 5 stents, last stent 5/2019      Neuropathy      Myocardial infarction      Stroke  mild left facial numbness   no other residuals verbalized      Myoclonic jerking      Stage 3 chronic kidney disease      History of Cataract Extraction      Hx of CABG      H/O coronary angiogram      S/P coronary artery stent placement  1/6/09      S/P placement of cardiac pacemaker          FAMILY HISTORY:  No pertinent family history in first degree relatives        Social History:  Lives with family  Ambulates with walker  Denies tobacco, EtOH, or illicit substance use (23 Dec 2023 22:51)      RADIOLOGY:  [ ] Reviewed and interpreted by me    PULMONARY FUNCTION TESTS:    EKG:

## 2024-02-25 NOTE — CHART NOTE - NSCHARTNOTEFT_GEN_A_CORE
RCU PA POCUS NOTE     : DADA BRO     INDICATION: Volume overloaded    PROCEDURE:  [ x] LIMITED ECHO  [ x] LIMITED CHEST  [ ] LIMITED RETROPERITONEAL  [ ] LIMITED ABDOMINAL  [ ] LIMITED DVT  [ ] NEEDLE GUIDANCE VASCULAR  [ ] NEEDLE GUIDANCE THORACENTESIS  [ ] NEEDLE GUIDANCE PARACENTESIS  [ ] NEEDLE GUIDANCE PERICARDIOCENTESIS  [ ] OTHER    FINDINGS:  Limited cardiac windows. LVSF appears decreased and aortic valve calcified and likely stenosed. BL B lines appreciated on A line predominance. BL small simple posterior pleural effusions.     INTERPRETATION:  LBVF decreased  Aortic stenosis   BL pleural effusions     ALISTAIR Thomas, PA-C  Department of Medicine/ RCU  In house [facility]CU [facility] [id number]  In house Medicine Beeper [phone]  Reachable via teams

## 2024-02-25 NOTE — PROGRESS NOTE ADULT - ASSESSMENT
IMPRESSION:  89M w/ DM2, HTN, CVA, PAD, CKD3, and CAD-CABG, 12/23/23 p/w COVID19 infection, c/b respiratory failure/hypotension; now s/p tracheostomy    (1)Renal - CKD3; superimposed mild prerenal azotemia - numbers fluctuating based on hemodynamic status  (2)Hypokalemia - improved s/p repletion   (3)CV - now off pressors and midodrine, BP improved/stable    (4)Pulm - vent-dependent   (5ID - afebrile, s/p zosyn   (6)GI - s/p PEG (2/20)  (7)Hypernatremia - improving on free water    RECOMMEND:   (1)IV lasix per primary team  (2)A/w Free water 250cc q6h  (3)Continue meds for GFR 30-40ml/min    Faby Cummins DNP  [facility]  [phone]

## 2024-02-25 NOTE — PROGRESS NOTE ADULT - NS ATTEND AMEND GEN_ALL_CORE FT
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 Course c/b acute cholecystitis s/p perc asa 1/26 by IR, also with recent SMA stent.     Patient failed trial of extubation, also possible sz. Now s/p tracheostomy and PEG. EEG without sz. Currently on Keppra. S/P another course of abx for fevers and hypotension.     PPM interrogation with pAFIB.    #Acute hypoxemic/hypercapnic respiratory failure  #Multifocal Pneumonia  #Dysphagia  #Acute cholecystitis  #Encephalopathy  #Afib  #SMA stent   #Petechial hemorrhages  - Frequent aspiration noted clinically and on CT scans. now reintubated, unable to clear secretions. 2/2 to poor cough and mental status. Now s/p tracheostomy.   - EEG now without s/w keppra 500.. MRI without acute findings.   - S/P course of antibiotics for MSSA pna as well as aspiration. Continue to monitor off abx.  - C/W vent, adjust based on gas, PS as tolerated.  - S/P IR drainage of biliary tube, monitor output. Reconsult IR closer to d/c if drain should remain in place.   -Cards- new AFib, seen on interrogation, will f/u with family risk and benefits. Per vascular would want to c/w DAPT for stent. D/W family. Will think about a/c after restarting Brilinta - C/W ASA, Brilinta. per GI-restarted on 2/23.   - Seen by optho, continue to monitor  FEN- Lasix PRN to maintain euvolemia.   GI- S/P PEG now on tube feeds.   - DVT ppx - SQH  - Dispo- full code.  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 Course c/b acute cholecystitis s/p perc asa 1/26 by IR, also with recent SMA stent.     Patient failed trial of extubation, also possible sz. Now s/p tracheostomy and PEG. EEG without sz. Currently on Keppra. S/P another course of abx for fevers and hypotension.     PPM interrogation with pAFIB.    #Acute hypoxemic/hypercapnic respiratory failure  #Multifocal Pneumonia  #Dysphagia  #Acute cholecystitis  #Encephalopathy  #Afib  #SMA stent   #Petechial hemorrhages  - Frequent aspiration noted clinically and on CT scans. now reintubated, unable to clear secretions. 2/2 to poor cough and mental status. Now s/p tracheostomy.   - EEG now without s/w keppra 500.. MRI without acute findings.   - S/P course of antibiotics for MSSA pna as well as aspiration. Continue to monitor off abx.  - C/W vent, adjust based on gas, PS as tolerated.  - S/P IR drainage of biliary tube, monitor output. Reconsult IR closer to d/c if drain should remain in place.   -Cards- new AFib, seen on interrogation, will f/u with family risk and benefits. Per vascular would want to c/w DAPT for stent. D/W family. Will think about a/c after restarting Brilinta - C/W ASA, Brilinta. per GI-restarted on 2/23. RX being   - Seen by optho, continue to monitor  FEN- Lasix PRN to maintain euvolemia.   GI- S/P PEG now on tube feeds.   - DVT ppx - SQH  - Dispo- full code.  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 Course c/b acute cholecystitis s/p perc asa 1/26 by IR, also with recent SMA stent.     Patient failed trial of extubation, also possible sz. Now s/p tracheostomy and PEG. EEG without sz. Currently on Keppra. S/P another course of abx for fevers and hypotension.     PPM interrogation with pAFIB.    #Acute hypoxemic/hypercapnic respiratory failure  #Multifocal Pneumonia  #Dysphagia  #Acute cholecystitis  #Encephalopathy  #Afib  #SMA stent   #Petechial hemorrhages  - Frequent aspiration noted clinically and on CT scans. now reintubated, unable to clear secretions. 2/2 to poor cough and mental status. Now s/p tracheostomy.   - EEG now without s/w keppra 500.. MRI without acute findings.   - S/P course of antibiotics for MSSA pna as well as aspiration. Continue to monitor off abx.  - C/W vent, adjust based on gas, PS as tolerated.  - S/P IR drainage of biliary tube, monitor output. Reconsult IR closer to d/c if drain should remain in place.   -Cards- new AFib, seen on interrogation, will f/u with family risk and benefits. Per vascular would want to c/w DAPT for stent. D/W family. Will think about a/c after restarting Brilinta - C/W ASA, Brilinta. per GI-restarted on 2/23.   CARDIAC meds adjustment as of 2/25-RX--coreg (off lopressor) add hydralazine  - Seen by optho, continue to monitor  FEN- Lasix PRN to maintain euvolemia.   GI- S/P PEG now on tube feeds.   - DVT ppx - SQH  - Dispo- full code.  Candelario Johnson MD-Pulmonary   [phone]

## 2024-02-25 NOTE — PROGRESS NOTE ADULT - ASSESSMENT
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 Course ultimately complicated by progressive WOB and O2 demand and patient intubated and transferred to MICU (1/22). Course further complicated by acute cholecystitis and s/p Perc Lynsey with IR on (1/26), HFrEF 38, pulmonary edema, superimposed PNA, failed extubation failed and prolonged vent time and s/p trach (2/13). Patient transferred to RCU (2/16) and s/p PEG (2/20)     NEUROLOGY   # Encephalopathy   - Hx of CVA with no residual deficits per family, however per family worsening confusion at home over the past 6 months (becoming disoriented to time) but prior to admission has been more confused, talking about seeing people that are not present.  - CTH (1/31) with no evidence of acute infarct  - Continue on ASA and brilinta  - Holding home Gabapentin and Memantine in setting of somnolence    # ICA stenosis   - CTA NECK (1/31) with moderate to severe narrowing left internal carotid at the origin. Severe narrowingright internal carotid by a tandem lesion 1.5 cm above the bifurcation with a narrowed internal carotid beyond the lesion. Extensive calcified plaque both cavernous and supraclinoid carotid arteries with narrowing but no occlusion. Mild narrowing proximal right M1 segment. Moderate narrowing both vertebral V4 segments. No perfusion abnormality at the Tmax 6 second threshold, but moderate hypoperfusion in the right MCA territory at the 4 second threshold.   - Continue on ASA and brilinta    # Myoclonic jerks   - Hx of myoclonic jerks post CVAs   - EEG (1/18-2/6) negative for seizures  - Continue on keppra and per neuro wishes to wean, however family wishes to keep current dose as home medication.     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   - Continue on lopressor 12.5mg BID however replaced with coreg 6.25 second to HFrEF   - AC discussed at length however with recent GIB will hold for now     # HFrEF 38 likely stress induced?   # Mild to moderate AS   - ECHO (12/2023) with EF 62 with normal LVSF and mild to moderate AS   - ECHO (2/2024) with EF 38, moderate LVSD with global LV hypokinesis, mildly reduced RVSF (TAPSE 1.3), mild to moderate MR, mild AR, and moderate AS.   - Continue on coreg 6.25 and uptitrate as able   - Continue on intermittent diuresis (lasix 40mg IVP given 2/24)  - Given moderate AS monitor BP closely   - Discuss further optimization of GDMT      # CAD with CABG   - CATH (5/2022) with dLAD 70, SVG graft to OM1 with 90 in stent stenosis s/p PCI and GEMMA placement, and LIMA graft to mLAD with no disease.   - Continue on ASA and brilinta    RESPIRATORY   # Acute respiratory failure second to SARS-COV-2 vs pulm edema vs PNA  - Presented with COVID complicated by sepsis second to acute lynsey and worsening respiratory failure second to pulmonary edema requiring intubation.   - Prolonged intubation and s/p tracheostomy (2/13)   - CT CHEST 1/16 with new small bilateral pleural effusions/ atelectasis/ patchy bilateral ground-glass opacities are consistent with pulmonary edema.  - Completed ABX and COVID regimen as below   - Continue on vent and initially tolerates SBT 15/5 however now with poor tolerance second to weakness vs volume   - Continue on nebs and hold further HTS given intermittent hemoptysis  - Continue on diuresis as above    # Mild hemoptysis likely from suction trauma   - s/p bronch (2/18) with no active bleed  - Hemoptysis improved with TXA and holding further HTS as above   - Tiny hemoptysis returned today second to suction trauma   - Careful with suctioning     HEENT   # L eye subconjunctival hemorrhage   - Continue on artifical tears and lacrilube   - Optho eval appreciated     GI  # Nutrition/ Dysphagia   - PEG placed by GI on 2/20 and tube feeds continued.   - Loose stools and banatrol continued     # UGIB   - EGD (1/2) with esophagitis and bleeding Dieulafoy's lesion s/p clips.   - Continue on PPI and carafate     # SMA calcification   - CTA demonstrating mesenteric fat stranding associated with ascending/transverse colon  - Diagnostic laparoscopy (12/24) with small bowel and visible colon viable, some inflammation of omentum in RUQ  - SMA Angiogram and stent placed (12/25).   - Continue on ASA and brilinta     # Acute Cholecystits   - CT (12/26) with distended GB with cholelithiasis and sludge   - HIDA (12/29) POSITIVE for acute cholecystitis   - Case discussed with IR at length and s/p PERC LYNSEY (1/26)   - Completed zosyn course (12/24-12/31)     / RENAL   # CKD   - CRE at baseline   - Monitor renal function and UOP     # Hypernatremia   - Free water deficit 1.7L and  Q6H added for now   - Monitor closely with diuresis as above     INFECTIOUS DISEASE   # COVID with superimposed PNA   - COVID POSITIVE (12/23) and completed remdesivir x 1 dose and paxlovid course.   - WOB worsened as above requiring intubation and cultures negative. Zosyn completed empirically however hypothermic (2/11) and BCx, UA, RVP and BAL negative.   - Completed additional zosyn (2/12-2/19) empirically   - Monitor oFF ABX     HEME  # AOCD   - Monitor HH   - DVT PPX with HSQ     ENDOCRINE   # DM2 A1C 9.3   - Continue on lantus 12 and ISS     SKIN/ TUBES   - Trach (2/13)   - PEG (2/20)   - PERC LYNSEY (1/26 - )     ETHICS   - FULL CODE     DISPO - vent facility and family aware. SW aware to send out to facilities and family to discuss on DISPO plan.   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 Course ultimately complicated by progressive WOB and O2 demand and patient intubated and transferred to MICU (1/22). Course further complicated by acute cholecystitis and s/p Perc Lynsey with IR on (1/26), HFrEF 38, pulmonary edema, superimposed PNA, failed extubation failed and prolonged vent time and s/p trach (2/13). Patient transferred to RCU (2/16) and s/p PEG (2/20)     NEUROLOGY   # Encephalopathy   - Hx of CVA with no residual deficits per family, however per family worsening confusion at home over the past 6 months (becoming disoriented to time) but prior to admission has been more confused, talking about seeing people that are not present.  - CTH (1/31) with no evidence of acute infarct  - Continue on ASA and brilinta  - Holding home Gabapentin and Memantine in setting of somnolence    # ICA stenosis   - CTA NECK (1/31) with moderate to severe narrowing left internal carotid at the origin. Severe narrowingright internal carotid by a tandem lesion 1.5 cm above the bifurcation with a narrowed internal carotid beyond the lesion. Extensive calcified plaque both cavernous and supraclinoid carotid arteries with narrowing but no occlusion. Mild narrowing proximal right M1 segment. Moderate narrowing both vertebral V4 segments. No perfusion abnormality at the Tmax 6 second threshold, but moderate hypoperfusion in the right MCA territory at the 4 second threshold.   - Continue on ASA and brilinta    # Myoclonic jerks   - Hx of myoclonic jerks post CVAs   - EEG (1/18-2/6) negative for seizures  - Continue on keppra and per neuro wishes to wean, however family wishes to keep current dose as home medication.     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   - Continue on lopressor 12.5mg BID however replaced with coreg 6.25 second to HFrEF   - AC discussed at length however with recent GIB will hold for now     # HFrEF 38 likely stress induced?   # Mild to moderate AS   - ECHO (12/2023) with EF 62 with normal LVSF and mild to moderate AS   - ECHO (2/2024) with EF 38, moderate LVSD with global LV hypokinesis, mildly reduced RVSF (TAPSE 1.3), mild to moderate MR, mild AR, and moderate AS.   - Continue on coreg 6.25 and uptitrate as able   - Continue on intermittent diuresis (lasix 40mg IVP given 2/24)  - Given moderate AS monitor BP closely   - Discuss further optimization of GDMT      # CAD with CABG   - CATH (5/2022) with dLAD 70, SVG graft to OM1 with 90 in stent stenosis s/p PCI and GEMMA placement, and LIMA graft to mLAD with no disease.   - Continue on ASA and brilinta    RESPIRATORY   # Acute respiratory failure second to SARS-COV-2 vs pulm edema vs PNA  - Presented with COVID complicated by sepsis second to acute lynsey and worsening respiratory failure second to pulmonary edema requiring intubation.   - Prolonged intubation and s/p tracheostomy (2/13)   - CT CHEST 1/16 with new small bilateral pleural effusions/ atelectasis/ patchy bilateral ground-glass opacities are consistent with pulmonary edema.  - Completed ABX and COVID regimen as below   - Continue on vent and initially tolerates SBT 15/5 however now with poor tolerance second to weakness vs volume   - Continue on nebs and hold further HTS given intermittent hemoptysis  - Continue on diuresis as above    # Mild hemoptysis likely from suction trauma   - s/p bronch (2/18) with no active bleed  - Hemoptysis improved with TXA and holding further HTS as above   - Tiny hemoptysis returned today second to suction trauma   - Careful with suctioning     HEENT   # L eye subconjunctival hemorrhage   - Continue on artifical tears and lacrilube   - Optho eval appreciated     GI  # Nutrition/ Dysphagia   - PEG placed by GI on 2/20 and tube feeds continued.   - Loose stools and banatrol continued     # UGIB   - EGD (1/2) with esophagitis and bleeding Dieulafoy's lesion s/p clips.   - Continue on PPI and carafate     # SMA calcification   - CTA demonstrating mesenteric fat stranding associated with ascending/transverse colon  - Diagnostic laparoscopy (12/24) with small bowel and visible colon viable, some inflammation of omentum in RUQ  - SMA Angiogram and stent placed (12/25).   - Continue on ASA and brilinta     # Acute Cholecystits   - CT (12/26) with distended GB with cholelithiasis and sludge   - HIDA (12/29) POSITIVE for acute cholecystitis   - Case discussed with IR at length and s/p PERC LYNSEY (1/26)   - Completed zosyn course (12/24-12/31)     / RENAL   # CKD   - CRE at baseline   - Monitor renal function and UOP     # Hypernatremia   - Free water deficit 1.7L and  Q6H added for now   - Monitor closely with diuresis as above     INFECTIOUS DISEASE   # COVID with superimposed PNA   - COVID POSITIVE (12/23) and completed remdesivir x 1 dose and paxlovid course.   - WOB worsened as above requiring intubation and cultures negative. Zosyn completed empirically however hypothermic (2/11) and BCx, UA, RVP and BAL negative.   - Completed additional zosyn (2/12-2/19) empirically   - Monitor oFF ABX     HEME  # AOCD   - Monitor HH   - DVT PPX with HSQ     ENDOCRINE   # DM2 A1C 9.3   - Continue on lantus 12 and ISS     SKIN/ TUBES   - Trach (2/13)   - PEG (2/20)   - PERC LYNSEY (1/26 - )     ETHICS   - FULL CODE     DISPO - vent facility and family aware. SW aware to send out to facilities and family to discuss on DISPO plan.   *********************  2/24-no new events  2/25-weaning failed--? 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 Course ultimately complicated by progressive WOB and O2 demand and patient intubated and transferred to MICU (1/22). Course further complicated by acute cholecystitis and s/p Perc Lynsey with IR on (1/26), HFrEF 38, pulmonary edema, superimposed PNA, failed extubation failed and prolonged vent time and s/p trach (2/13). Patient transferred to RCU (2/16) and s/p PEG (2/20)     NEUROLOGY   # Encephalopathy   - Hx of CVA with no residual deficits per family, however per family worsening confusion at home over the past 6 months (becoming disoriented to time) but prior to admission has been more confused, talking about seeing people that are not present.  - CTH (1/31) with no evidence of acute infarct  - Continue on ASA and brilinta  - Holding Neurontin, Memantine and Oxycodone in setting of somnolence    # ICA stenosis   - CTA NECK (1/31) with moderate to severe narrowing left internal carotid at the origin. Severe narrowing right internal carotid by a tandem lesion 1.5 cm above the bifurcation with a narrowed internal carotid beyond the lesion. Extensive calcified plaque both cavernous and supraclinoid carotid arteries with narrowing but no occlusion. Mild narrowing proximal right M1 segment. Moderate narrowing both vertebral V4 segments. No perfusion abnormality at the Tmax 6 second threshold, but moderate hypoperfusion in the right MCA territory at the 4 second threshold.   - Continue on ASA and brilinta    # Myoclonic jerks   - Hx of myoclonic jerks post CVAs   - EEG (1/18-2/6) negative for seizures  - Continue on keppra and per neuro wishes to wean, however family wishes to keep current dose as home medication.     # Depression   - Continue on lexapro per home medication   - Insomnic per family and melatonin added   - Supportive care     CARDIOLOGY   # Vasoplegic vs septic shock  # Hx of HTN   - Weaned off pressors in ICU and now with mild hypertension   - Continue on lopressor as below and monitor HR     # AFIB RVR   # Bifasicular Block with Medtronic PPM   - AFIB RVR episodes and PPM interrogated (2/20) by EP with similar episodes  - Previous admission in 6/2022 with cardiogenic syncope second to bifasicular block, however now with PPM and AV myron blockers ok.   - Continue on lopressor 12.5mg BID however replaced with coreg 6.25 second to HFrEF   - AC discussed at length however with recent GIB will hold for now     # HFrEF 38 likely stress induced?   # Mild to moderate AS   - ECHO (12/2023) with EF 62 with normal LVSF and mild to moderate AS   - ECHO (2/2024) with EF 38, moderate LVSD with global LV hypokinesis, mildly reduced RVSF (TAPSE 1.3), mild to moderate MR, mild AR, and moderate AS.   - Continue on coreg 6.25 and hydral 10    - Continue on diuresis by dose (lasix 40mg IVP given 2/25)  - Given moderate AS monitor BP closely and avoid hypertension   - Add isordil if able     # CAD with CABG   - CATH (5/2022) with dLAD 70, SVG graft to OM1 with 90 in stent stenosis s/p PCI and GEMMA placement, and LIMA graft to mLAD with no disease.   - Continue on ASA and brilinta    RESPIRATORY   # Acute respiratory failure second to SARS-COV-2 vs pulm edema vs PNA  - Presented with COVID complicated by sepsis second to acute lynsey and worsening respiratory failure second to pulmonary edema requiring intubation.   - Prolonged intubation and s/p tracheostomy (2/13)   - CT CHEST 1/16 with new small bilateral pleural effusions/ atelectasis/ patchy bilateral ground-glass opacities are consistent with pulmonary edema.  - Completed ABX and COVID regimen as below   - Continue on vent and initially tolerates SBT 15/5 however now with poor tolerance second to weakness vs volume   - Continue on nebs and hold further HTS given intermittent hemoptysis  - Continue on diuresis as above    # Mild hemoptysis likely from suction trauma   - s/p bronch (2/18) with no active bleed  - Hemoptysis improved with TXA and holding further HTS as above   - Tiny hemoptysis second to suction trauma imporving  - Careful with suctioning     HEENT   # L eye subconjunctival hemorrhage   - Continue on artifical tears and lacrilube   - Optho eval appreciated     GI  # Nutrition/ Dysphagia   - PEG placed by GI on 2/20 and tube feeds continued.   - Loose stools and banatrol continued     # UGIB   - EGD (1/2) with esophagitis and bleeding Dieulafoy's lesion s/p clips.   - Continue on PPI and carafate     # SMA calcification   - CTA demonstrating mesenteric fat stranding associated with ascending/transverse colon  - Diagnostic laparoscopy (12/24) with small bowel and visible colon viable, some inflammation of omentum in RUQ  - SMA Angiogram and stent placed (12/25).   - Continue on ASA and brilinta     # Acute Cholecystits   - CT (12/26) with distended GB with cholelithiasis and sludge   - HIDA (12/29) POSITIVE for acute cholecystitis   - Case discussed with IR at length and s/p PERC LYNSEY (1/26)   - Completed zosyn course (12/24-12/31)     / RENAL   # CKD   - CRE at baseline   - Monitor renal function and UOP     # Hypernatremia   -  Q6H added with improvement   - Monitor closely with diuresis as above     INFECTIOUS DISEASE   # COVID with superimposed PNA   - COVID POSITIVE (12/23) and completed remdesivir x 1 dose and paxlovid course.   - WOB worsened as above requiring intubation and cultures negative. Zosyn completed empirically however hypothermic (2/11) and BCx, UA, RVP and BAL negative.   - Completed additional zosyn (2/12-2/19) empirically   - Monitor oFF ABX     HEME  # AOCD   - Monitor HH   - DVT PPX with HSQ     ENDOCRINE   # DM2 A1C 9.3   - Continue on lantus 12 and ISS     SKIN/ TUBES   - Trach (2/13)   - PEG (2/20)   - PERC LYNSEY (1/26 - )     ETHICS   - FULL CODE     DISPO - vent facility and family aware. SW aware to send out to facilities and family to discuss on DISPO plan.

## 2024-02-26 LAB
ANION GAP SERPL CALC-SCNC: 10 MMOL/L — SIGNIFICANT CHANGE UP (ref 7–14)
BUN SERPL-MCNC: 34 MG/DL — HIGH (ref 7–23)
CALCIUM SERPL-MCNC: 8.8 MG/DL — SIGNIFICANT CHANGE UP (ref 8.4–10.5)
CHLORIDE SERPL-SCNC: 107 MMOL/L — SIGNIFICANT CHANGE UP (ref 98–107)
CO2 SERPL-SCNC: 30 MMOL/L — SIGNIFICANT CHANGE UP (ref 22–31)
CREAT SERPL-MCNC: 1.4 MG/DL — HIGH (ref 0.5–1.3)
EGFR: 48 ML/MIN/1.73M2 — LOW
GLUCOSE BLDC GLUCOMTR-MCNC: 145 MG/DL — HIGH (ref 70–99)
GLUCOSE BLDC GLUCOMTR-MCNC: 169 MG/DL — HIGH (ref 70–99)
GLUCOSE BLDC GLUCOMTR-MCNC: 201 MG/DL — HIGH (ref 70–99)
GLUCOSE BLDC GLUCOMTR-MCNC: 229 MG/DL — HIGH (ref 70–99)
GLUCOSE SERPL-MCNC: 175 MG/DL — HIGH (ref 70–99)
HCT VFR BLD CALC: 29 % — LOW (ref 39–50)
HGB BLD-MCNC: 8.7 G/DL — LOW (ref 13–17)
MAGNESIUM SERPL-MCNC: 2 MG/DL — SIGNIFICANT CHANGE UP (ref 1.6–2.6)
MCHC RBC-ENTMCNC: 28.7 PG — SIGNIFICANT CHANGE UP (ref 27–34)
MCHC RBC-ENTMCNC: 30 GM/DL — LOW (ref 32–36)
MCV RBC AUTO: 95.7 FL — SIGNIFICANT CHANGE UP (ref 80–100)
NRBC # BLD: 0 /100 WBCS — SIGNIFICANT CHANGE UP (ref 0–0)
NRBC # FLD: 0 K/UL — SIGNIFICANT CHANGE UP (ref 0–0)
PHOSPHATE SERPL-MCNC: 2.7 MG/DL — SIGNIFICANT CHANGE UP (ref 2.5–4.5)
PLATELET # BLD AUTO: 402 K/UL — HIGH (ref 150–400)
POTASSIUM SERPL-MCNC: 3.5 MMOL/L — SIGNIFICANT CHANGE UP (ref 3.5–5.3)
POTASSIUM SERPL-SCNC: 3.5 MMOL/L — SIGNIFICANT CHANGE UP (ref 3.5–5.3)
RBC # BLD: 3.03 M/UL — LOW (ref 4.2–5.8)
RBC # FLD: 16.8 % — HIGH (ref 10.3–14.5)
SODIUM SERPL-SCNC: 147 MMOL/L — HIGH (ref 135–145)
WBC # BLD: 8.17 K/UL — SIGNIFICANT CHANGE UP (ref 3.8–10.5)
WBC # FLD AUTO: 8.17 K/UL — SIGNIFICANT CHANGE UP (ref 3.8–10.5)

## 2024-02-26 PROCEDURE — 99233 SBSQ HOSP IP/OBS HIGH 50: CPT | Mod: FS

## 2024-02-26 RX ORDER — IPRATROPIUM/ALBUTEROL SULFATE 18-103MCG
3 AEROSOL WITH ADAPTER (GRAM) INHALATION EVERY 12 HOURS
Refills: 0 | Status: DISCONTINUED | OUTPATIENT
Start: 2024-02-26 | End: 2024-03-27

## 2024-02-26 RX ADMIN — Medication 4: at 18:00

## 2024-02-26 RX ADMIN — Medication 1 GRAM(S): at 18:01

## 2024-02-26 RX ADMIN — Medication 1 DROP(S): at 05:24

## 2024-02-26 RX ADMIN — Medication 4: at 11:17

## 2024-02-26 RX ADMIN — CHLORHEXIDINE GLUCONATE 15 MILLILITER(S): 213 SOLUTION TOPICAL at 05:24

## 2024-02-26 RX ADMIN — LEVETIRACETAM 500 MILLIGRAM(S): 250 TABLET, FILM COATED ORAL at 18:00

## 2024-02-26 RX ADMIN — Medication 1 DROP(S): at 11:17

## 2024-02-26 RX ADMIN — PANTOPRAZOLE SODIUM 40 MILLIGRAM(S): 20 TABLET, DELAYED RELEASE ORAL at 18:02

## 2024-02-26 RX ADMIN — Medication 1 DROP(S): at 18:01

## 2024-02-26 RX ADMIN — INSULIN GLARGINE 12 UNIT(S): 100 INJECTION, SOLUTION SUBCUTANEOUS at 18:01

## 2024-02-26 RX ADMIN — Medication 1 APPLICATION(S): at 21:11

## 2024-02-26 RX ADMIN — TICAGRELOR 60 MILLIGRAM(S): 90 TABLET ORAL at 18:02

## 2024-02-26 RX ADMIN — ESCITALOPRAM OXALATE 10 MILLIGRAM(S): 10 TABLET, FILM COATED ORAL at 11:18

## 2024-02-26 RX ADMIN — CHLORHEXIDINE GLUCONATE 1 APPLICATION(S): 213 SOLUTION TOPICAL at 11:18

## 2024-02-26 RX ADMIN — Medication 2: at 05:25

## 2024-02-26 RX ADMIN — TICAGRELOR 60 MILLIGRAM(S): 90 TABLET ORAL at 05:25

## 2024-02-26 RX ADMIN — CARVEDILOL PHOSPHATE 6.25 MILLIGRAM(S): 80 CAPSULE, EXTENDED RELEASE ORAL at 05:24

## 2024-02-26 RX ADMIN — Medication 10 MILLIGRAM(S): at 21:10

## 2024-02-26 RX ADMIN — Medication 3 MILLILITER(S): at 04:04

## 2024-02-26 RX ADMIN — Medication 2 MILLIGRAM(S): at 21:11

## 2024-02-26 RX ADMIN — Medication 1 GRAM(S): at 05:25

## 2024-02-26 RX ADMIN — HEPARIN SODIUM 5000 UNIT(S): 5000 INJECTION INTRAVENOUS; SUBCUTANEOUS at 21:11

## 2024-02-26 RX ADMIN — CARVEDILOL PHOSPHATE 6.25 MILLIGRAM(S): 80 CAPSULE, EXTENDED RELEASE ORAL at 18:02

## 2024-02-26 RX ADMIN — Medication 10 MILLIGRAM(S): at 05:30

## 2024-02-26 RX ADMIN — Medication 3 MILLILITER(S): at 09:05

## 2024-02-26 RX ADMIN — HEPARIN SODIUM 5000 UNIT(S): 5000 INJECTION INTRAVENOUS; SUBCUTANEOUS at 14:10

## 2024-02-26 RX ADMIN — CHLORHEXIDINE GLUCONATE 15 MILLILITER(S): 213 SOLUTION TOPICAL at 18:03

## 2024-02-26 RX ADMIN — PANTOPRAZOLE SODIUM 40 MILLIGRAM(S): 20 TABLET, DELAYED RELEASE ORAL at 05:25

## 2024-02-26 RX ADMIN — Medication 6 MILLIGRAM(S): at 21:11

## 2024-02-26 RX ADMIN — LEVETIRACETAM 500 MILLIGRAM(S): 250 TABLET, FILM COATED ORAL at 05:25

## 2024-02-26 RX ADMIN — Medication 81 MILLIGRAM(S): at 11:18

## 2024-02-26 RX ADMIN — HEPARIN SODIUM 5000 UNIT(S): 5000 INJECTION INTRAVENOUS; SUBCUTANEOUS at 05:24

## 2024-02-26 RX ADMIN — Medication 10 MILLIGRAM(S): at 14:10

## 2024-02-26 RX ADMIN — Medication 3 MILLILITER(S): at 20:33

## 2024-02-26 NOTE — PROGRESS NOTE ADULT - SUBJECTIVE AND OBJECTIVE BOX
NEPHROLOGY-[facility] [phone]        Patient seen and examined trach to vent.         MEDICATIONS  (STANDING):  albuterol/ipratropium for Nebulization 3 milliLiter(s) Nebulizer every 12 hours  artificial  tears Solution 1 Drop(s) Left EYE four times a day  aspirin  chewable 81 milliGRAM(s) Enteral Tube daily  carvedilol 6.25 milliGRAM(s) Oral every 12 hours  chlorhexidine 0.12% Liquid 15 milliLiter(s) Oral Mucosa every 12 hours  chlorhexidine 2% Cloths 1 Application(s) Topical daily  dextrose 5%. 1000 milliLiter(s) (100 mL/Hr) IV Continuous <Continuous>  dextrose 5%. 1000 milliLiter(s) (50 mL/Hr) IV Continuous <Continuous>  dextrose 50% Injectable 25 Gram(s) IV Push once  dextrose 50% Injectable 12.5 Gram(s) IV Push once  dextrose 50% Injectable 25 Gram(s) IV Push once  doxazosin 2 milliGRAM(s) Oral at bedtime  escitalopram Solution 10 milliGRAM(s) Oral daily  glucagon  Injectable 1 milliGRAM(s) IntraMuscular once  heparin   Injectable 5000 Unit(s) SubCutaneous every 8 hours  hydrALAZINE 10 milliGRAM(s) Oral every 8 hours  insulin glargine Injectable (LANTUS) 12 Unit(s) SubCutaneous <User Schedule>  insulin lispro (ADMELOG) corrective regimen sliding scale   SubCutaneous every 6 hours  levETIRAcetam  Solution 500 milliGRAM(s) Oral two times a day  melatonin 6 milliGRAM(s) Oral at bedtime  pantoprazole   Suspension 40 milliGRAM(s) Oral every 12 hours  petrolatum Ophthalmic Ointment 1 Application(s) Left EYE at bedtime  sucralfate suspension 1 Gram(s) Enteral Tube two times a day  ticagrelor 60 milliGRAM(s) Oral every 12 hours      VITAL:  T(C): , Max: 36.9 (02-26-24 @ 12:00)  T(F): , Max: 98.4 (02-26-24 @ 12:00)  HR: 93 (02-26-24 @ 12:00)  BP: 137/88 (02-26-24 @ 12:00)  BP(mean): --  RR: 19 (02-26-24 @ 12:00)  SpO2: 99% (02-26-24 @ 12:00)  Wt(kg): --    I and O's:    02-25 @ 07:01  -  02-26 @ 07:00  --------------------------------------------------------  IN: 2140 mL / OUT: 2150 mL / NET: -10 mL        PHYSICAL EXAM:  Constitutional: NAD trach to vent   HEENT: +trach  Respiratory: coarse bs   Cardiovascular: normal s1s2  Gastrointestinal: BS+, soft, NT/ND +PEG  Extremities:+ peripheral edema  :  + external catheter   Skin: No rashes      LABS:                        8.7    8.17  )-----------( 402      ( 26 Feb 2024 05:40 )             29.0     02-26    147<H>  |  107  |  34<H>  ----------------------------<  175<H>  3.5   |  30  |  1.40<H>    Ca    8.8      26 Feb 2024 05:40  Phos  2.7     02-26  Mg     2.00     02-26            Urine Studies:  Urinalysis Basic - ( 26 Feb 2024 05:40 )    Color: x / Appearance: x / SG: x / pH: x  Gluc: 175 mg/dL / Ketone: x  / Bili: x / Urobili: x   Blood: x / Protein: x / Nitrite: x   Leuk Esterase: x / RBC: x / WBC x   Sq Epi: x / Non Sq Epi: x / Bacteria: x      IMPRESSION:  89M w/ DM2, HTN, CVA, PAD, CKD3, and CAD-CABG, 12/23/23 p/w COVID19 infection, c/b respiratory failure/hypotension; now s/p tracheostomy    (1)Renal - CKD3; superimposed mild prerenal azotemia - numbers fluctuating based on hemodynamic status  (2)Hypokalemia - improved s/p repletion   (3)CV - now off pressors and midodrine, BP improved/stable    (4)Pulm - vent-dependent   (5ID - afebrile, s/p zosyn   (6)GI - s/p PEG (2/20)  (7)Hypernatremia - possibly due to diuresis, improving on free water    RECOMMEND:   (1)A/w holding lasix  (2)A/w Free water 250cc q6h  (3)Continue meds for GFR 30-40ml/min    Wendi Alvarenga NP  [facility]  [phone]            NEPHROLOGY-[facility] [phone]        Patient seen and examined trach to vent.         MEDICATIONS  (STANDING):  albuterol/ipratropium for Nebulization 3 milliLiter(s) Nebulizer every 12 hours  artificial  tears Solution 1 Drop(s) Left EYE four times a day  aspirin  chewable 81 milliGRAM(s) Enteral Tube daily  carvedilol 6.25 milliGRAM(s) Oral every 12 hours  chlorhexidine 0.12% Liquid 15 milliLiter(s) Oral Mucosa every 12 hours  chlorhexidine 2% Cloths 1 Application(s) Topical daily  dextrose 5%. 1000 milliLiter(s) (100 mL/Hr) IV Continuous <Continuous>  dextrose 5%. 1000 milliLiter(s) (50 mL/Hr) IV Continuous <Continuous>  dextrose 50% Injectable 25 Gram(s) IV Push once  dextrose 50% Injectable 12.5 Gram(s) IV Push once  dextrose 50% Injectable 25 Gram(s) IV Push once  doxazosin 2 milliGRAM(s) Oral at bedtime  escitalopram Solution 10 milliGRAM(s) Oral daily  glucagon  Injectable 1 milliGRAM(s) IntraMuscular once  heparin   Injectable 5000 Unit(s) SubCutaneous every 8 hours  hydrALAZINE 10 milliGRAM(s) Oral every 8 hours  insulin glargine Injectable (LANTUS) 12 Unit(s) SubCutaneous <User Schedule>  insulin lispro (ADMELOG) corrective regimen sliding scale   SubCutaneous every 6 hours  levETIRAcetam  Solution 500 milliGRAM(s) Oral two times a day  melatonin 6 milliGRAM(s) Oral at bedtime  pantoprazole   Suspension 40 milliGRAM(s) Oral every 12 hours  petrolatum Ophthalmic Ointment 1 Application(s) Left EYE at bedtime  sucralfate suspension 1 Gram(s) Enteral Tube two times a day  ticagrelor 60 milliGRAM(s) Oral every 12 hours      VITAL:  T(C): , Max: 36.9 (02-26-24 @ 12:00)  T(F): , Max: 98.4 (02-26-24 @ 12:00)  HR: 93 (02-26-24 @ 12:00)  BP: 137/88 (02-26-24 @ 12:00)  BP(mean): --  RR: 19 (02-26-24 @ 12:00)  SpO2: 99% (02-26-24 @ 12:00)  Wt(kg): --    I and O's:    02-25 @ 07:01  -  02-26 @ 07:00  --------------------------------------------------------  IN: 2140 mL / OUT: 2150 mL / NET: -10 mL        PHYSICAL EXAM:  Constitutional: NAD trach to vent   HEENT: +trach  Respiratory: coarse bs   Cardiovascular: normal s1s2  Gastrointestinal: BS+, soft, NT/ND +PEG  Extremities:+ peripheral edema  :  + external catheter   Skin: No rashes      LABS:                        8.7    8.17  )-----------( 402      ( 26 Feb 2024 05:40 )             29.0     02-26    147<H>  |  107  |  34<H>  ----------------------------<  175<H>  3.5   |  30  |  1.40<H>    Ca    8.8      26 Feb 2024 05:40  Phos  2.7     02-26  Mg     2.00     02-26            Urine Studies:  Urinalysis Basic - ( 26 Feb 2024 05:40 )    Color: x / Appearance: x / SG: x / pH: x  Gluc: 175 mg/dL / Ketone: x  / Bili: x / Urobili: x   Blood: x / Protein: x / Nitrite: x   Leuk Esterase: x / RBC: x / WBC x   Sq Epi: x / Non Sq Epi: x / Bacteria: x      IMPRESSION:  89M w/ DM2, HTN, CVA, PAD, CKD3, and CAD-CABG, 12/23/23 p/w COVID19 infection, c/b respiratory failure/hypotension; now s/p tracheostomy    (1)Renal - CKD3; superimposed mild prerenal azotemia - numbers fluctuating based on hemodynamic status  (2)Hypokalemia - improved s/p repletion   (3)CV - now off pressors and midodrine, BP improved/stable    (4)Pulm - vent-dependent   (5ID - afebrile, s/p zosyn   (6)GI - s/p PEG (2/20)  (7)Hypernatremia - possibly due to diuresis, improving on free water    RECOMMEND:   (1)A/w holding lasix  (2)A/w Free water 250cc q6h  (3)Continue meds for GFR 30-40ml/min    Wendi Alvarenga NP  [facility]  [phone]     RENAL ATTENDING NOTE  Patient seen and examined with NP. Agree with assessment and plan as above.    Davey Chacko MD  [facility]  [phone]

## 2024-02-26 NOTE — PROGRESS NOTE ADULT - ASSESSMENT
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 Course ultimately complicated by progressive WOB and O2 demand and patient intubated and transferred to MICU (1/22). Course further complicated by acute cholecystitis and s/p Perc Lynsey with IR on (1/26), HFrEF 38, pulmonary edema, superimposed PNA, failed extubation failed and prolonged vent time and s/p trach (2/13). Patient transferred to RCU (2/16) and s/p PEG (2/20)     NEUROLOGY   # Encephalopathy   - Hx of CVA with no residual deficits per family, however per family worsening confusion at home over the past 6 months (becoming disoriented to time) but prior to admission has been more confused, talking about seeing people that are not present.  - CTH (1/31) with no evidence of acute infarct  - Continue on ASA and brilinta  - Holding Neurontin, Memantine and Oxycodone in setting of somnolence    # ICA stenosis   - CTA NECK (1/31) with moderate to severe narrowing left internal carotid at the origin. Severe narrowing right internal carotid by a tandem lesion 1.5 cm above the bifurcation with a narrowed internal carotid beyond the lesion. Extensive calcified plaque both cavernous and supraclinoid carotid arteries with narrowing but no occlusion. Mild narrowing proximal right M1 segment. Moderate narrowing both vertebral V4 segments. No perfusion abnormality at the Tmax 6 second threshold, but moderate hypoperfusion in the right MCA territory at the 4 second threshold.   - Continue on ASA and brilinta    # Myoclonic jerks   - Hx of myoclonic jerks post CVAs   - EEG (1/18-2/6) negative for seizures  - Continue on keppra and per neuro wishes to wean, however family wishes to keep current dose as home medication.     # Depression   - Continue on lexapro per home medication   - Insomnic per family and melatonin added   - Supportive care     CARDIOLOGY   # Vasoplegic vs septic shock  # Hx of HTN   - Weaned off pressors in ICU and now with mild hypertension   - Continue on lopressor as below and monitor HR     # AFIB RVR   # Bifasicular Block with Medtronic PPM   - AFIB RVR episodes and PPM interrogated (2/20) by EP with similar episodes  - Previous admission in 6/2022 with cardiogenic syncope second to bifasicular block, however now with PPM and AV myron blockers ok.   - Continue on lopressor 12.5mg BID however replaced with coreg 6.25 second to HFrEF   - AC discussed at length however with recent GIB will hold for now     # HFrEF 38 likely stress induced?   # Mild to moderate AS   - ECHO (12/2023) with EF 62 with normal LVSF and mild to moderate AS   - ECHO (2/2024) with EF 38, moderate LVSD with global LV hypokinesis, mildly reduced RVSF (TAPSE 1.3), mild to moderate MR, mild AR, and moderate AS.   - Continue on coreg 6.25 and hydral 10    - Continue on diuresis by dose (lasix 40mg IVP given 2/25)  - Given moderate AS monitor BP closely and avoid hypertension   - Add isordil if able     # CAD with CABG   - CATH (5/2022) with dLAD 70, SVG graft to OM1 with 90 in stent stenosis s/p PCI and GEMMA placement, and LIMA graft to mLAD with no disease.   - Continue on ASA and brilinta    RESPIRATORY   # Acute respiratory failure second to SARS-COV-2 vs pulm edema vs PNA  - Presented with COVID complicated by sepsis second to acute lynsey and worsening respiratory failure second to pulmonary edema requiring intubation.   - Prolonged intubation and s/p tracheostomy (2/13)   - CT CHEST 1/16 with new small bilateral pleural effusions/ atelectasis/ patchy bilateral ground-glass opacities are consistent with pulmonary edema.  - Completed ABX and COVID regimen as below   - Continue on vent and initially tolerates SBT 15/5 however now with poor tolerance second to weakness vs volume   - Continue on nebs and hold further HTS given intermittent hemoptysis  - Continue on diuresis as above    # Mild hemoptysis likely from suction trauma   - s/p bronch (2/18) with no active bleed  - Hemoptysis improved with TXA and holding further HTS as above   - Tiny hemoptysis second to suction trauma imporving  - Careful with suctioning     HEENT   # L eye subconjunctival hemorrhage   - Continue on artifical tears and lacrilube   - Optho eval appreciated     GI  # Nutrition/ Dysphagia   - PEG placed by GI on 2/20 and tube feeds continued.   - Loose stools and banatrol continued     # UGIB   - EGD (1/2) with esophagitis and bleeding Dieulafoy's lesion s/p clips.   - Continue on PPI and carafate     # SMA calcification   - CTA demonstrating mesenteric fat stranding associated with ascending/transverse colon  - Diagnostic laparoscopy (12/24) with small bowel and visible colon viable, some inflammation of omentum in RUQ  - SMA Angiogram and stent placed (12/25).   - Continue on ASA and brilinta     # Acute Cholecystits   - CT (12/26) with distended GB with cholelithiasis and sludge   - HIDA (12/29) POSITIVE for acute cholecystitis   - Case discussed with IR at length and s/p PERC LYNSEY (1/26)   - Completed zosyn course (12/24-12/31)     / RENAL   # CKD   - CRE at baseline   - Monitor renal function and UOP     # Hypernatremia   -  Q6H added with improvement   - Monitor closely with diuresis as above     INFECTIOUS DISEASE   # COVID with superimposed PNA   - COVID POSITIVE (12/23) and completed remdesivir x 1 dose and paxlovid course.   - WOB worsened as above requiring intubation and cultures negative. Zosyn completed empirically however hypothermic (2/11) and BCx, UA, RVP and BAL negative.   - Completed additional zosyn (2/12-2/19) empirically   - Monitor oFF ABX     HEME  # AOCD   - Monitor HH   - DVT PPX with HSQ     ENDOCRINE   # DM2 A1C 9.3   - Continue on lantus 12 and ISS     SKIN/ TUBES   - Trach (2/13)   - PEG (2/20)   - PERC LYNSEY (1/26 - )     ETHICS   - FULL CODE     DISPO - vent facility and family aware. SW aware to send out to facilities and family to discuss on DISPO plan.  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 Course ultimately complicated by progressive WOB and O2 demand and patient intubated and transferred to MICU (1/22). Course further complicated by acute cholecystitis and s/p Perc Lynsey with IR on (1/26), HFrEF 38, pulmonary edema, superimposed PNA, failed extubation failed and prolonged vent time and s/p trach (2/13). Patient transferred to RCU (2/16) and s/p PEG (2/20)     NEUROLOGY   # Encephalopathy   - Hx of CVA with no residual deficits per family, however per family worsening confusion at home over the past 6 months (becoming disoriented to time) but prior to admission has been more confused, talking about seeing people that are not present.  - CTH (1/31) with no evidence of acute infarct  - Continue on ASA and brilinta  - Holding Neurontin, Memantine and Oxycodone in setting of somnolence    # ICA stenosis   - CTA NECK (1/31) with moderate to severe narrowing left internal carotid at the origin. Severe narrowing right internal carotid by a tandem lesion 1.5 cm above the bifurcation with a narrowed internal carotid beyond the lesion. Extensive calcified plaque both cavernous and supraclinoid carotid arteries with narrowing but no occlusion. Mild narrowing proximal right M1 segment. Moderate narrowing both vertebral V4 segments. No perfusion abnormality at the Tmax 6 second threshold, but moderate hypoperfusion in the right MCA territory at the 4 second threshold.   - Continue on ASA and brilinta    # Myoclonic jerks   - Hx of myoclonic jerks post CVAs   - EEG (1/18-2/6) negative for seizures  - Continue on keppra and per neuro wishes to wean, however family wishes to keep current dose as home medication.     # Depression   - Continue on lexapro per home medication   - Insomnic per family and melatonin added   - Supportive care     CARDIOLOGY   # Vasoplegic vs septic shock  # Hx of HTN   - Weaned off pressors in ICU and now with mild hypertension   - Continue on lopressor as below and monitor HR     # AFIB RVR   # Bifasicular Block with Medtronic PPM   - AFIB RVR episodes and PPM interrogated (2/20) by EP with similar episodes  - Previous admission in 6/2022 with cardiogenic syncope second to bifasicular block, however now with PPM and AV myron blockers ok.   - Continue on lopressor 12.5mg BID however replaced with coreg 6.25 second to HFrEF   - AC discussed at length however with recent GIB will hold for now     # HFrEF 38 likely stress induced?   # Mild to moderate AS   - ECHO (12/2023) with EF 62 with normal LVSF and mild to moderate AS   - ECHO (2/2024) with EF 38, moderate LVSD with global LV hypokinesis, mildly reduced RVSF (TAPSE 1.3), mild to moderate MR, mild AR, and moderate AS.   - Continue on coreg 6.25 and hydral 10    - Continue on diuresis by dose (lasix 40mg IVP given 2/25)  - Given moderate AS monitor BP closely and avoid hypertension   - Add isordil if able     # CAD with CABG   - CATH (5/2022) with dLAD 70, SVG graft to OM1 with 90 in stent stenosis s/p PCI and GEMMA placement, and LIMA graft to mLAD with no disease.   - Continue on ASA and brilinta    RESPIRATORY   # Acute respiratory failure second to SARS-COV-2 vs pulm edema vs PNA  - Presented with COVID complicated by sepsis second to acute lynsey and worsening respiratory failure second to pulmonary edema requiring intubation.   - Prolonged intubation and s/p tracheostomy (2/13)   - CT CHEST 1/16 with new small bilateral pleural effusions/ atelectasis/ patchy bilateral ground-glass opacities are consistent with pulmonary edema.  - Completed ABX and COVID regimen as below   - Continue on vent and initially tolerates SBT 15/5 however now with poor tolerance second to weakness vs volume   - Continue on nebs and hold further HTS given intermittent hemoptysis  - Continue on diuresis as above  - Thick sputum so will send repeat SCx (2/26)    # Mild hemoptysis likely from suction trauma   - s/p bronch (2/18) with no active bleed  - Hemoptysis improved with TXA and holding further HTS as above   - Tiny hemoptysis second to suction trauma imporving  - Careful with suctioning     HEENT   # L eye subconjunctival hemorrhage   - Continue on artifical tears and lacrilube   - Optho eval appreciated     GI  # Nutrition/ Dysphagia   - PEG placed by GI on 2/20 and tube feeds continued.   - Loose stools and banatrol continued     # UGIB   - EGD (1/2) with esophagitis and bleeding Dieulafoy's lesion s/p clips.   - Continue on PPI and carafate     # SMA calcification   - CTA demonstrating mesenteric fat stranding associated with ascending/transverse colon  - Diagnostic laparoscopy (12/24) with small bowel and visible colon viable, some inflammation of omentum in RUQ  - SMA Angiogram and stent placed (12/25).   - Continue on ASA and brilinta     # Acute Cholecystits   - CT (12/26) with distended GB with cholelithiasis and sludge   - HIDA (12/29) POSITIVE for acute cholecystitis   - Case discussed with IR at length and s/p PERC LYNSEY (1/26)   - Completed zosyn course (12/24-12/31)     / RENAL   # CKD   - CRE at baseline   - Monitor renal function and UOP     # Hypernatremia   -  Q6H added with improvement   - Monitor closely with diuresis as above     INFECTIOUS DISEASE   # COVID with superimposed PNA   - COVID POSITIVE (12/23) and completed remdesivir x 1 dose and paxlovid course.   - WOB worsened as above requiring intubation and cultures negative. Zosyn completed empirically however hypothermic (2/11) and BCx, UA, RVP and BAL negative.   - Completed additional zosyn (2/12-2/19) empirically   - Monitor oFF ABX     HEME  # AOCD   - Monitor HH   - DVT PPX with HSQ     ENDOCRINE   # DM2 A1C 9.3   - Continue on lantus 12 and ISS     SKIN/ TUBES   - Trach (2/13)   - PEG (2/20)   - PERC LYNSEY (1/26 - )     ETHICS   - FULL CODE     DISPO - vent facility and family aware. SW aware to send out to facilities and family to discuss on DISPO plan.

## 2024-02-26 NOTE — PROGRESS NOTE ADULT - SUBJECTIVE AND OBJECTIVE BOX
CHIEF COMPLAINT:Patient is a 89y old  Male who presents with a chief complaint of COVID19, Chest pain (25 Feb 2024 11:17)      INTERVAL EVENTS:     ROS: Seen by bedside during AM rounds     OBJECTIVE:  ICU Vital Signs Last 24 Hrs  T(C): 36.8 (26 Feb 2024 05:15), Max: 36.8 (25 Feb 2024 11:30)  T(F): 98.2 (26 Feb 2024 05:15), Max: 98.2 (25 Feb 2024 11:30)  HR: 101 (26 Feb 2024 05:15) (98 - 112)  BP: 118/70 (26 Feb 2024 05:15) (93/59 - 144/96)  BP(mean): --  ABP: --  ABP(mean): --  RR: 18 (26 Feb 2024 05:15) (17 - 18)  SpO2: 100% (26 Feb 2024 05:15) (97% - 100%)    O2 Parameters below as of 26 Feb 2024 05:15  Patient On (Oxygen Delivery Method): ventilator          Mode: AC/ CMV (Assist Control/ Continuous Mandatory Ventilation), RR (machine): 12, TV (machine): 450, FiO2: 30, PEEP: 5, ITime: 0.6, MAP: 14, PIP: 38    02-25 @ 07:01  -  02-26 @ 07:00  --------------------------------------------------------  IN: 2140 mL / OUT: 2150 mL / NET: -10 mL      CAPILLARY BLOOD GLUCOSE      POCT Blood Glucose.: 169 mg/dL (26 Feb 2024 05:01)      PHYSICAL EXAM:  General:   HEENT:   Lymph Nodes:  Neck:   Respiratory:   Cardiovascular:   Abdomen:   Extremities:   Skin:   Neurological:  Psychiatry:    Mode: AC/ CMV (Assist Control/ Continuous Mandatory Ventilation)  RR (machine): 12  TV (machine): 450  FiO2: 30  PEEP: 5  ITime: 0.6  MAP: 14  PIP: 38      HOSPITAL MEDICATIONS:  MEDICATIONS  (STANDING):  albuterol/ipratropium for Nebulization 3 milliLiter(s) Nebulizer every 6 hours  artificial  tears Solution 1 Drop(s) Left EYE four times a day  aspirin  chewable 81 milliGRAM(s) Enteral Tube daily  carvedilol 6.25 milliGRAM(s) Oral every 12 hours  chlorhexidine 0.12% Liquid 15 milliLiter(s) Oral Mucosa every 12 hours  chlorhexidine 2% Cloths 1 Application(s) Topical daily  dextrose 5%. 1000 milliLiter(s) (100 mL/Hr) IV Continuous <Continuous>  dextrose 5%. 1000 milliLiter(s) (50 mL/Hr) IV Continuous <Continuous>  dextrose 50% Injectable 25 Gram(s) IV Push once  dextrose 50% Injectable 12.5 Gram(s) IV Push once  dextrose 50% Injectable 25 Gram(s) IV Push once  doxazosin 2 milliGRAM(s) Oral at bedtime  escitalopram Solution 10 milliGRAM(s) Oral daily  glucagon  Injectable 1 milliGRAM(s) IntraMuscular once  heparin   Injectable 5000 Unit(s) SubCutaneous every 8 hours  hydrALAZINE 10 milliGRAM(s) Oral every 8 hours  insulin glargine Injectable (LANTUS) 12 Unit(s) SubCutaneous <User Schedule>  insulin lispro (ADMELOG) corrective regimen sliding scale   SubCutaneous every 6 hours  levETIRAcetam  Solution 500 milliGRAM(s) Oral two times a day  melatonin 6 milliGRAM(s) Oral at bedtime  pantoprazole   Suspension 40 milliGRAM(s) Oral every 12 hours  petrolatum Ophthalmic Ointment 1 Application(s) Left EYE at bedtime  sucralfate suspension 1 Gram(s) Enteral Tube two times a day  ticagrelor 60 milliGRAM(s) Oral every 12 hours    MEDICATIONS  (PRN):  acetaminophen   Oral Liquid .. 650 milliGRAM(s) Oral every 6 hours PRN Temp greater or equal to 38C (100.4F), Mild Pain (1 - 3), Moderate Pain (4 - 6)  dextrose Oral Gel 15 Gram(s) Oral once PRN Blood Glucose LESS THAN 70 milliGRAM(s)/deciliter      LABS:                        8.7    8.17  )-----------( 402      ( 26 Feb 2024 05:40 )             29.0     02-26    147<H>  |  107  |  34<H>  ----------------------------<  175<H>  3.5   |  30  |  1.40<H>    Ca    8.8      26 Feb 2024 05:40  Phos  2.7     02-26  Mg     2.00     02-26        Urinalysis Basic - ( 26 Feb 2024 05:40 )    Color: x / Appearance: x / SG: x / pH: x  Gluc: 175 mg/dL / Ketone: x  / Bili: x / Urobili: x   Blood: x / Protein: x / Nitrite: x   Leuk Esterase: x / RBC: x / WBC x   Sq Epi: x / Non Sq Epi: x / Bacteria: x         CHIEF COMPLAINT:Patient is a 89y old  Male who presents with a chief complaint of COVID19, Chest pain (25 Feb 2024 11:17)      INTERVAL EVENTS:     ROS: Seen by bedside during AM rounds     OBJECTIVE:  ICU Vital Signs Last 24 Hrs  T(C): 36.8 (26 Feb 2024 05:15), Max: 36.8 (25 Feb 2024 11:30)  T(F): 98.2 (26 Feb 2024 05:15), Max: 98.2 (25 Feb 2024 11:30)  HR: 101 (26 Feb 2024 05:15) (98 - 112)  BP: 118/70 (26 Feb 2024 05:15) (93/59 - 144/96)  BP(mean): --  ABP: --  ABP(mean): --  RR: 18 (26 Feb 2024 05:15) (17 - 18)  SpO2: 100% (26 Feb 2024 05:15) (97% - 100%)    O2 Parameters below as of 26 Feb 2024 05:15  Patient On (Oxygen Delivery Method): ventilator          Mode: AC/ CMV (Assist Control/ Continuous Mandatory Ventilation), RR (machine): 12, TV (machine): 450, FiO2: 30, PEEP: 5, ITime: 0.6, MAP: 14, PIP: 38    02-25 @ 07:01  -  02-26 @ 07:00  --------------------------------------------------------  IN: 2140 mL / OUT: 2150 mL / NET: -10 mL      CAPILLARY BLOOD GLUCOSE      POCT Blood Glucose.: 169 mg/dL (26 Feb 2024 05:01)      PHYSICAL EXAM:  General: NAD   Neck: (+) Trach tube noted, site c/d/i.  Cards: S1/S2, no murmurs   Pulm: CTA bilaterally. No wheezes.   Abdomen: Soft, NTND. (+) PEG noted, site c/d/i. (+)Lynsey drain in anterior abdomen, some dark bilious fluid in collection bag.   Extremities: 1-2+ b/l LE pitting edema. Extremities warm to touch. Intact distal pulses.   Neurology: awake/alert. Not following commands. Tracks intermittently.   Skin: warm to touch, color appropriate for ethnicity. Refer to RN assessment for further details.    Mode: AC/ CMV (Assist Control/ Continuous Mandatory Ventilation)  RR (machine): 12  TV (machine): 450  FiO2: 30  PEEP: 5  ITime: 0.6  MAP: 14  PIP: 38      HOSPITAL MEDICATIONS:  MEDICATIONS  (STANDING):  albuterol/ipratropium for Nebulization 3 milliLiter(s) Nebulizer every 6 hours  artificial  tears Solution 1 Drop(s) Left EYE four times a day  aspirin  chewable 81 milliGRAM(s) Enteral Tube daily  carvedilol 6.25 milliGRAM(s) Oral every 12 hours  chlorhexidine 0.12% Liquid 15 milliLiter(s) Oral Mucosa every 12 hours  chlorhexidine 2% Cloths 1 Application(s) Topical daily  dextrose 5%. 1000 milliLiter(s) (100 mL/Hr) IV Continuous <Continuous>  dextrose 5%. 1000 milliLiter(s) (50 mL/Hr) IV Continuous <Continuous>  dextrose 50% Injectable 25 Gram(s) IV Push once  dextrose 50% Injectable 12.5 Gram(s) IV Push once  dextrose 50% Injectable 25 Gram(s) IV Push once  doxazosin 2 milliGRAM(s) Oral at bedtime  escitalopram Solution 10 milliGRAM(s) Oral daily  glucagon  Injectable 1 milliGRAM(s) IntraMuscular once  heparin   Injectable 5000 Unit(s) SubCutaneous every 8 hours  hydrALAZINE 10 milliGRAM(s) Oral every 8 hours  insulin glargine Injectable (LANTUS) 12 Unit(s) SubCutaneous <User Schedule>  insulin lispro (ADMELOG) corrective regimen sliding scale   SubCutaneous every 6 hours  levETIRAcetam  Solution 500 milliGRAM(s) Oral two times a day  melatonin 6 milliGRAM(s) Oral at bedtime  pantoprazole   Suspension 40 milliGRAM(s) Oral every 12 hours  petrolatum Ophthalmic Ointment 1 Application(s) Left EYE at bedtime  sucralfate suspension 1 Gram(s) Enteral Tube two times a day  ticagrelor 60 milliGRAM(s) Oral every 12 hours    MEDICATIONS  (PRN):  acetaminophen   Oral Liquid .. 650 milliGRAM(s) Oral every 6 hours PRN Temp greater or equal to 38C (100.4F), Mild Pain (1 - 3), Moderate Pain (4 - 6)  dextrose Oral Gel 15 Gram(s) Oral once PRN Blood Glucose LESS THAN 70 milliGRAM(s)/deciliter      LABS:                        8.7    8.17  )-----------( 402      ( 26 Feb 2024 05:40 )             29.0     02-26    147<H>  |  107  |  34<H>  ----------------------------<  175<H>  3.5   |  30  |  1.40<H>    Ca    8.8      26 Feb 2024 05:40  Phos  2.7     02-26  Mg     2.00     02-26        Urinalysis Basic - ( 26 Feb 2024 05:40 )    Color: x / Appearance: x / SG: x / pH: x  Gluc: 175 mg/dL / Ketone: x  / Bili: x / Urobili: x   Blood: x / Protein: x / Nitrite: x   Leuk Esterase: x / RBC: x / WBC x   Sq Epi: x / Non Sq Epi: x / Bacteria: x         CHIEF COMPLAINT:Patient is a 89y old  Male who presents with a chief complaint of COVID19, Chest pain (25 Feb 2024 11:17)      INTERVAL EVENTS: no overnight events.    ROS: Seen by bedside during AM rounds     OBJECTIVE:  ICU Vital Signs Last 24 Hrs  T(C): 36.8 (26 Feb 2024 05:15), Max: 36.8 (25 Feb 2024 11:30)  T(F): 98.2 (26 Feb 2024 05:15), Max: 98.2 (25 Feb 2024 11:30)  HR: 101 (26 Feb 2024 05:15) (98 - 112)  BP: 118/70 (26 Feb 2024 05:15) (93/59 - 144/96)  BP(mean): --  ABP: --  ABP(mean): --  RR: 18 (26 Feb 2024 05:15) (17 - 18)  SpO2: 100% (26 Feb 2024 05:15) (97% - 100%)    O2 Parameters below as of 26 Feb 2024 05:15  Patient On (Oxygen Delivery Method): ventilator          Mode: AC/ CMV (Assist Control/ Continuous Mandatory Ventilation), RR (machine): 12, TV (machine): 450, FiO2: 30, PEEP: 5, ITime: 0.6, MAP: 14, PIP: 38    02-25 @ 07:01  -  02-26 @ 07:00  --------------------------------------------------------  IN: 2140 mL / OUT: 2150 mL / NET: -10 mL      CAPILLARY BLOOD GLUCOSE      POCT Blood Glucose.: 169 mg/dL (26 Feb 2024 05:01)      PHYSICAL EXAM:  General: NAD   Neck: (+) Trach tube noted, site c/d/i.  Cards: S1/S2, no murmurs   Pulm: CTA bilaterally. No wheezes.   Abdomen: Soft, NTND. (+) PEG noted, site c/d/i. (+)Lynsey drain in anterior abdomen, some dark bilious fluid in collection bag.   Extremities: 1-2+ b/l LE pitting edema. Extremities warm to touch. Intact distal pulses.   Neurology: awake/alert. Not following commands. Tracks intermittently.   Skin: warm to touch, color appropriate for ethnicity. Refer to RN assessment for further details.    Mode: AC/ CMV (Assist Control/ Continuous Mandatory Ventilation)  RR (machine): 12  TV (machine): 450  FiO2: 30  PEEP: 5  ITime: 0.6  MAP: 14  PIP: 38      HOSPITAL MEDICATIONS:  MEDICATIONS  (STANDING):  albuterol/ipratropium for Nebulization 3 milliLiter(s) Nebulizer every 6 hours  artificial  tears Solution 1 Drop(s) Left EYE four times a day  aspirin  chewable 81 milliGRAM(s) Enteral Tube daily  carvedilol 6.25 milliGRAM(s) Oral every 12 hours  chlorhexidine 0.12% Liquid 15 milliLiter(s) Oral Mucosa every 12 hours  chlorhexidine 2% Cloths 1 Application(s) Topical daily  dextrose 5%. 1000 milliLiter(s) (100 mL/Hr) IV Continuous <Continuous>  dextrose 5%. 1000 milliLiter(s) (50 mL/Hr) IV Continuous <Continuous>  dextrose 50% Injectable 25 Gram(s) IV Push once  dextrose 50% Injectable 12.5 Gram(s) IV Push once  dextrose 50% Injectable 25 Gram(s) IV Push once  doxazosin 2 milliGRAM(s) Oral at bedtime  escitalopram Solution 10 milliGRAM(s) Oral daily  glucagon  Injectable 1 milliGRAM(s) IntraMuscular once  heparin   Injectable 5000 Unit(s) SubCutaneous every 8 hours  hydrALAZINE 10 milliGRAM(s) Oral every 8 hours  insulin glargine Injectable (LANTUS) 12 Unit(s) SubCutaneous <User Schedule>  insulin lispro (ADMELOG) corrective regimen sliding scale   SubCutaneous every 6 hours  levETIRAcetam  Solution 500 milliGRAM(s) Oral two times a day  melatonin 6 milliGRAM(s) Oral at bedtime  pantoprazole   Suspension 40 milliGRAM(s) Oral every 12 hours  petrolatum Ophthalmic Ointment 1 Application(s) Left EYE at bedtime  sucralfate suspension 1 Gram(s) Enteral Tube two times a day  ticagrelor 60 milliGRAM(s) Oral every 12 hours    MEDICATIONS  (PRN):  acetaminophen   Oral Liquid .. 650 milliGRAM(s) Oral every 6 hours PRN Temp greater or equal to 38C (100.4F), Mild Pain (1 - 3), Moderate Pain (4 - 6)  dextrose Oral Gel 15 Gram(s) Oral once PRN Blood Glucose LESS THAN 70 milliGRAM(s)/deciliter      LABS:                        8.7    8.17  )-----------( 402      ( 26 Feb 2024 05:40 )             29.0     02-26    147<H>  |  107  |  34<H>  ----------------------------<  175<H>  3.5   |  30  |  1.40<H>    Ca    8.8      26 Feb 2024 05:40  Phos  2.7     02-26  Mg     2.00     02-26        Urinalysis Basic - ( 26 Feb 2024 05:40 )    Color: x / Appearance: x / SG: x / pH: x  Gluc: 175 mg/dL / Ketone: x  / Bili: x / Urobili: x   Blood: x / Protein: x / Nitrite: x   Leuk Esterase: x / RBC: x / WBC x   Sq Epi: x / Non Sq Epi: x / Bacteria: x

## 2024-02-27 LAB
ANION GAP SERPL CALC-SCNC: 10 MMOL/L — SIGNIFICANT CHANGE UP (ref 7–14)
ANION GAP SERPL CALC-SCNC: 11 MMOL/L — SIGNIFICANT CHANGE UP (ref 7–14)
BASOPHILS # BLD AUTO: 0.07 K/UL — SIGNIFICANT CHANGE UP (ref 0–0.2)
BASOPHILS NFR BLD AUTO: 1.1 % — SIGNIFICANT CHANGE UP (ref 0–2)
BUN SERPL-MCNC: 34 MG/DL — HIGH (ref 7–23)
BUN SERPL-MCNC: 35 MG/DL — HIGH (ref 7–23)
CALCIUM SERPL-MCNC: 8.5 MG/DL — SIGNIFICANT CHANGE UP (ref 8.4–10.5)
CALCIUM SERPL-MCNC: 8.7 MG/DL — SIGNIFICANT CHANGE UP (ref 8.4–10.5)
CHLORIDE SERPL-SCNC: 108 MMOL/L — HIGH (ref 98–107)
CHLORIDE SERPL-SCNC: 108 MMOL/L — HIGH (ref 98–107)
CO2 SERPL-SCNC: 30 MMOL/L — SIGNIFICANT CHANGE UP (ref 22–31)
CO2 SERPL-SCNC: 31 MMOL/L — SIGNIFICANT CHANGE UP (ref 22–31)
CREAT SERPL-MCNC: 1.3 MG/DL — SIGNIFICANT CHANGE UP (ref 0.5–1.3)
CREAT SERPL-MCNC: 1.34 MG/DL — HIGH (ref 0.5–1.3)
EGFR: 50 ML/MIN/1.73M2 — LOW
EGFR: 52 ML/MIN/1.73M2 — LOW
EOSINOPHIL # BLD AUTO: 0.67 K/UL — HIGH (ref 0–0.5)
EOSINOPHIL NFR BLD AUTO: 10.3 % — HIGH (ref 0–6)
GLUCOSE BLDC GLUCOMTR-MCNC: 152 MG/DL — HIGH (ref 70–99)
GLUCOSE BLDC GLUCOMTR-MCNC: 171 MG/DL — HIGH (ref 70–99)
GLUCOSE BLDC GLUCOMTR-MCNC: 185 MG/DL — HIGH (ref 70–99)
GLUCOSE BLDC GLUCOMTR-MCNC: 191 MG/DL — HIGH (ref 70–99)
GLUCOSE BLDC GLUCOMTR-MCNC: 210 MG/DL — HIGH (ref 70–99)
GLUCOSE SERPL-MCNC: 165 MG/DL — HIGH (ref 70–99)
GLUCOSE SERPL-MCNC: 178 MG/DL — HIGH (ref 70–99)
GRAM STN FLD: ABNORMAL
HCT VFR BLD CALC: 27.4 % — LOW (ref 39–50)
HGB BLD-MCNC: 8.4 G/DL — LOW (ref 13–17)
IANC: 3.59 K/UL — SIGNIFICANT CHANGE UP (ref 1.8–7.4)
IMM GRANULOCYTES NFR BLD AUTO: 0.3 % — SIGNIFICANT CHANGE UP (ref 0–0.9)
LYMPHOCYTES # BLD AUTO: 1.79 K/UL — SIGNIFICANT CHANGE UP (ref 1–3.3)
LYMPHOCYTES # BLD AUTO: 27.5 % — SIGNIFICANT CHANGE UP (ref 13–44)
MAGNESIUM SERPL-MCNC: 2.2 MG/DL — SIGNIFICANT CHANGE UP (ref 1.6–2.6)
MAGNESIUM SERPL-MCNC: 2.2 MG/DL — SIGNIFICANT CHANGE UP (ref 1.6–2.6)
MCHC RBC-ENTMCNC: 29.5 PG — SIGNIFICANT CHANGE UP (ref 27–34)
MCHC RBC-ENTMCNC: 30.7 GM/DL — LOW (ref 32–36)
MCV RBC AUTO: 96.1 FL — SIGNIFICANT CHANGE UP (ref 80–100)
MONOCYTES # BLD AUTO: 0.37 K/UL — SIGNIFICANT CHANGE UP (ref 0–0.9)
MONOCYTES NFR BLD AUTO: 5.7 % — SIGNIFICANT CHANGE UP (ref 2–14)
NEUTROPHILS # BLD AUTO: 3.59 K/UL — SIGNIFICANT CHANGE UP (ref 1.8–7.4)
NEUTROPHILS NFR BLD AUTO: 55.1 % — SIGNIFICANT CHANGE UP (ref 43–77)
NRBC # BLD: 0 /100 WBCS — SIGNIFICANT CHANGE UP (ref 0–0)
NRBC # FLD: 0 K/UL — SIGNIFICANT CHANGE UP (ref 0–0)
PHOSPHATE SERPL-MCNC: 2.7 MG/DL — SIGNIFICANT CHANGE UP (ref 2.5–4.5)
PHOSPHATE SERPL-MCNC: 2.9 MG/DL — SIGNIFICANT CHANGE UP (ref 2.5–4.5)
PLATELET # BLD AUTO: 406 K/UL — HIGH (ref 150–400)
POTASSIUM SERPL-MCNC: 3.7 MMOL/L — SIGNIFICANT CHANGE UP (ref 3.5–5.3)
POTASSIUM SERPL-MCNC: 3.9 MMOL/L — SIGNIFICANT CHANGE UP (ref 3.5–5.3)
POTASSIUM SERPL-SCNC: 3.7 MMOL/L — SIGNIFICANT CHANGE UP (ref 3.5–5.3)
POTASSIUM SERPL-SCNC: 3.9 MMOL/L — SIGNIFICANT CHANGE UP (ref 3.5–5.3)
RBC # BLD: 2.85 M/UL — LOW (ref 4.2–5.8)
RBC # FLD: 16.9 % — HIGH (ref 10.3–14.5)
SODIUM SERPL-SCNC: 149 MMOL/L — HIGH (ref 135–145)
SODIUM SERPL-SCNC: 149 MMOL/L — HIGH (ref 135–145)
SPECIMEN SOURCE: SIGNIFICANT CHANGE UP
WBC # BLD: 6.51 K/UL — SIGNIFICANT CHANGE UP (ref 3.8–10.5)
WBC # FLD AUTO: 6.51 K/UL — SIGNIFICANT CHANGE UP (ref 3.8–10.5)

## 2024-02-27 PROCEDURE — 99233 SBSQ HOSP IP/OBS HIGH 50: CPT | Mod: FS

## 2024-02-27 PROCEDURE — 71045 X-RAY EXAM CHEST 1 VIEW: CPT | Mod: 26

## 2024-02-27 RX ORDER — PIPERACILLIN AND TAZOBACTAM 4; .5 G/20ML; G/20ML
3.38 INJECTION, POWDER, LYOPHILIZED, FOR SOLUTION INTRAVENOUS ONCE
Refills: 0 | Status: COMPLETED | OUTPATIENT
Start: 2024-02-27 | End: 2024-02-27

## 2024-02-27 RX ORDER — PIPERACILLIN AND TAZOBACTAM 4; .5 G/20ML; G/20ML
3.38 INJECTION, POWDER, LYOPHILIZED, FOR SOLUTION INTRAVENOUS EVERY 8 HOURS
Refills: 0 | Status: DISCONTINUED | OUTPATIENT
Start: 2024-02-28 | End: 2024-02-29

## 2024-02-27 RX ADMIN — CARVEDILOL PHOSPHATE 6.25 MILLIGRAM(S): 80 CAPSULE, EXTENDED RELEASE ORAL at 17:35

## 2024-02-27 RX ADMIN — Medication 3 MILLILITER(S): at 21:15

## 2024-02-27 RX ADMIN — Medication 10 MILLIGRAM(S): at 13:41

## 2024-02-27 RX ADMIN — CHLORHEXIDINE GLUCONATE 15 MILLILITER(S): 213 SOLUTION TOPICAL at 17:37

## 2024-02-27 RX ADMIN — LEVETIRACETAM 500 MILLIGRAM(S): 250 TABLET, FILM COATED ORAL at 05:34

## 2024-02-27 RX ADMIN — TICAGRELOR 60 MILLIGRAM(S): 90 TABLET ORAL at 05:34

## 2024-02-27 RX ADMIN — Medication 3 MILLILITER(S): at 08:30

## 2024-02-27 RX ADMIN — PIPERACILLIN AND TAZOBACTAM 200 GRAM(S): 4; .5 INJECTION, POWDER, LYOPHILIZED, FOR SOLUTION INTRAVENOUS at 20:22

## 2024-02-27 RX ADMIN — Medication 650 MILLIGRAM(S): at 17:34

## 2024-02-27 RX ADMIN — Medication 2 MILLIGRAM(S): at 21:23

## 2024-02-27 RX ADMIN — PANTOPRAZOLE SODIUM 40 MILLIGRAM(S): 20 TABLET, DELAYED RELEASE ORAL at 05:35

## 2024-02-27 RX ADMIN — HEPARIN SODIUM 5000 UNIT(S): 5000 INJECTION INTRAVENOUS; SUBCUTANEOUS at 21:58

## 2024-02-27 RX ADMIN — HEPARIN SODIUM 5000 UNIT(S): 5000 INJECTION INTRAVENOUS; SUBCUTANEOUS at 13:40

## 2024-02-27 RX ADMIN — Medication 1 APPLICATION(S): at 21:23

## 2024-02-27 RX ADMIN — CHLORHEXIDINE GLUCONATE 1 APPLICATION(S): 213 SOLUTION TOPICAL at 11:18

## 2024-02-27 RX ADMIN — Medication 2: at 05:38

## 2024-02-27 RX ADMIN — HEPARIN SODIUM 5000 UNIT(S): 5000 INJECTION INTRAVENOUS; SUBCUTANEOUS at 05:35

## 2024-02-27 RX ADMIN — PANTOPRAZOLE SODIUM 40 MILLIGRAM(S): 20 TABLET, DELAYED RELEASE ORAL at 17:38

## 2024-02-27 RX ADMIN — Medication 10 MILLIGRAM(S): at 21:23

## 2024-02-27 RX ADMIN — Medication 81 MILLIGRAM(S): at 11:17

## 2024-02-27 RX ADMIN — Medication 2: at 12:19

## 2024-02-27 RX ADMIN — Medication 1 GRAM(S): at 17:36

## 2024-02-27 RX ADMIN — CARVEDILOL PHOSPHATE 6.25 MILLIGRAM(S): 80 CAPSULE, EXTENDED RELEASE ORAL at 05:35

## 2024-02-27 RX ADMIN — ESCITALOPRAM OXALATE 10 MILLIGRAM(S): 10 TABLET, FILM COATED ORAL at 12:15

## 2024-02-27 RX ADMIN — Medication 1 DROP(S): at 17:39

## 2024-02-27 RX ADMIN — Medication 1 GRAM(S): at 05:34

## 2024-02-27 RX ADMIN — PIPERACILLIN AND TAZOBACTAM 25 GRAM(S): 4; .5 INJECTION, POWDER, LYOPHILIZED, FOR SOLUTION INTRAVENOUS at 22:43

## 2024-02-27 RX ADMIN — Medication 1 DROP(S): at 23:07

## 2024-02-27 RX ADMIN — Medication 2: at 17:38

## 2024-02-27 RX ADMIN — Medication 6 MILLIGRAM(S): at 21:23

## 2024-02-27 RX ADMIN — Medication 1 DROP(S): at 11:18

## 2024-02-27 RX ADMIN — Medication 1 DROP(S): at 05:36

## 2024-02-27 RX ADMIN — CHLORHEXIDINE GLUCONATE 15 MILLILITER(S): 213 SOLUTION TOPICAL at 05:35

## 2024-02-27 RX ADMIN — TICAGRELOR 60 MILLIGRAM(S): 90 TABLET ORAL at 17:41

## 2024-02-27 RX ADMIN — INSULIN GLARGINE 12 UNIT(S): 100 INJECTION, SOLUTION SUBCUTANEOUS at 12:16

## 2024-02-27 RX ADMIN — LEVETIRACETAM 500 MILLIGRAM(S): 250 TABLET, FILM COATED ORAL at 17:37

## 2024-02-27 RX ADMIN — Medication 10 MILLIGRAM(S): at 05:34

## 2024-02-27 NOTE — PROVIDER CONTACT NOTE (OTHER) - BACKGROUND
Patient admitted for infection due to acute respiratory syndrome coronavirus.
Patient admitted for infection due to severe acute respiratory syndrome coronavirus
Patient admitted for infection related to severe acute respiratory syndrome coronavirus. Patient is s/p lap asa.
Patient admitted for COVID-19.
Patient admitted for infection due to sever acute respiratory syndrome coronavirus. Patient is s/p lap asa.
GI bleed, anemia due to acute blood loss, infection due ti severe acute respiratory syndrome coronavirus
Pt admitted for covid. PMH CAD
Infection due to severe acute respiratory syndrome coronavirus 2
Patient is admitted for prior COVID and s/p lap asa
Pt admitted 12/23 for COVID. Pt admitted to 3N from SICU. Upon arriving to floor pt pulled out NG tube.
Pt admitted for SARS-CoV-2
Pt admitted for covid, PMH CAD
Patient admitted for infection due to SARS-CoV-2
Patient admitted for infection due to severe acute respiratory distress.
Patient admitted for severe acute respiratory syndrome and s/p lap asa

## 2024-02-27 NOTE — PROVIDER CONTACT NOTE (OTHER) - SITUATION
Pt desaturation to 70% while trach to vent
Pt pulled out NG tube & noted with black tarry stool
Patient had a NG tube placed and had verified via X ray.
Pt EKG completed and ready for result interpretation.
Pt Tachycardic to 130's
Patient and family refused to have 6am fingerstick done. Patient is off floor for procedure now.
Patient complaining of chest pain and epigastric pain
Patient's BP is 166/60.
Infection due to severe acute respiratory syndrome coronavirus 2
Pt noted to bring up bright red blood in sputum upon suction from the in-line catheter
Patient reporting distended abdomen and discomfort upon palpation.
Patient's BP is 167/69
Patient's BP is 177/78
When performing midline dressing change, skin tear was noted. Skin tear was noted under adhesive dressing of the central line.
BP is still 162/77 after receiving Metoprolol

## 2024-02-27 NOTE — PROVIDER CONTACT NOTE (OTHER) - RECOMMENDATIONS
Check fingerstick at 12pm.
EKG
Give medications for BP.
Continue to monitor skin tear.
Provider notified
Notify ACP
Notify provider
Wound Consult
Notify Provider
Pt noted with black tarry stool. Vitals: BP elevated. Vascular ACP Cesar made aware.
Review X-ray
Give blood pressure medications.
Give patient blood pressure medications.
Reassess BP is a hour.
Eddi Hernandez notified.

## 2024-02-27 NOTE — PROVIDER CONTACT NOTE (OTHER) - ASSESSMENT
Pt noted to bring up bright red blood in sputum upon suction from the in-line catheter
Infection due to severe acute respiratory syndrome coronavirus 2
Patient VSS, complaining of pain in left and right chest, and epigastric area
Patient is A&Ox3 and vitals otherwise stable.
Pt is resting in bed, and most recent VS /79, HR 1117, Temp 98F
Pt is resting in bed.
Pt noted to desaturate to 70% on monitor. Pt found to be lethargic and coughing. Pt suctioned and noted to have thick copious bloody secretions. Pt had a temp of 100.8 rectally and HR of 100. Pt received ambu bag and oxygen immediately went up to 96%
In order to keep sterility of midline dressing, we have to cover skin tear.
Patient is vitally stable otherwise.
Patient is  A&Ox3 and stable.
Patient is A&Ox3 and vitally stable.
Patient reporting distended abdomen and discomfort when palpated. Bowel sounds active in all four quadrants.
Pt a&Ox3, anxious, restless, answers questions appropriately but very confused. Sitting up in bed with sons at bedside.
Patient is A&Ox3 and vitally stable otherwise.
Patient is vitally stable otherwise.

## 2024-02-27 NOTE — CHART NOTE - NSCHARTNOTEFT_GEN_A_CORE
Notified by RN that patient had desaturation episode to the 70s. Patient was manually ventilated with BVM and suctioned with return of thick bloody secretions. Placed back on ventilator with improvement in saturations at baseline FiO2 settings. Also noted with fever 100.8F. Will treat witht tylenol and consider restarting abx if spikes again.

## 2024-02-27 NOTE — PROGRESS NOTE ADULT - SUBJECTIVE AND OBJECTIVE BOX
Aashish Boyer MD  Interventional Cardiology / Endovascular Specialist  [place] Office : [phone] [place] [id number] Tel:   [place] Office : [address] [facility] [address]  Tel: [phone]      Subjective/Overnight events: Patient lying in bed in RCU s/p trach/PEG no acute distress  	  MEDICATIONS:  aspirin  chewable 81 milliGRAM(s) Enteral Tube daily  carvedilol 6.25 milliGRAM(s) Oral every 12 hours  doxazosin 2 milliGRAM(s) Oral at bedtime  heparin   Injectable 5000 Unit(s) SubCutaneous every 8 hours  hydrALAZINE 10 milliGRAM(s) Oral every 8 hours  ticagrelor 60 milliGRAM(s) Oral every 12 hours      albuterol/ipratropium for Nebulization 3 milliLiter(s) Nebulizer every 12 hours    acetaminophen   Oral Liquid .. 650 milliGRAM(s) Oral every 6 hours PRN  escitalopram Solution 10 milliGRAM(s) Oral daily  levETIRAcetam  Solution 500 milliGRAM(s) Oral two times a day  melatonin 6 milliGRAM(s) Oral at bedtime    pantoprazole   Suspension 40 milliGRAM(s) Oral every 12 hours  sucralfate suspension 1 Gram(s) Enteral Tube two times a day    dextrose 50% Injectable 25 Gram(s) IV Push once  dextrose 50% Injectable 25 Gram(s) IV Push once  dextrose 50% Injectable 12.5 Gram(s) IV Push once  dextrose Oral Gel 15 Gram(s) Oral once PRN  glucagon  Injectable 1 milliGRAM(s) IntraMuscular once  insulin glargine Injectable (LANTUS) 12 Unit(s) SubCutaneous <User Schedule>  insulin lispro (ADMELOG) corrective regimen sliding scale   SubCutaneous every 6 hours    artificial  tears Solution 1 Drop(s) Left EYE four times a day  chlorhexidine 0.12% Liquid 15 milliLiter(s) Oral Mucosa every 12 hours  chlorhexidine 2% Cloths 1 Application(s) Topical daily  dextrose 5%. 1000 milliLiter(s) IV Continuous <Continuous>  dextrose 5%. 1000 milliLiter(s) IV Continuous <Continuous>  petrolatum Ophthalmic Ointment 1 Application(s) Left EYE at bedtime      PAST MEDICAL/SURGICAL HISTORY  PAST MEDICAL & SURGICAL HISTORY:  Hyperlipemia      Hypertension      Coronary Artery Disease      Diabetes Mellitus Type II      Stented Coronary Artery  total 5 stents, last stent 5/2019      Neuropathy      Myocardial infarction      Stroke  mild left facial numbness   no other residuals verbalized      Myoclonic jerking      Stage 3 chronic kidney disease      History of Cataract Extraction      Hx of CABG      H/O coronary angiogram      S/P coronary artery stent placement  1/6/09      S/P placement of cardiac pacemaker          SOCIAL HISTORY: Substance Use (street drugs): ( x ) never used  (  ) other:    FAMILY HISTORY:  No pertinent family history in first degree relatives            PHYSICAL EXAM:  T(C): 36.8 (02-27-24 @ 05:40), Max: 36.9 (02-26-24 @ 18:00)  HR: 96 (02-27-24 @ 11:48) (80 - 97)  BP: 128/67 (02-27-24 @ 05:40) (114/75 - 130/62)  RR: 18 (02-27-24 @ 05:40) (18 - 19)  SpO2: 98% (02-27-24 @ 11:48) (97% - 100%)  Wt(kg): --  I&O's Summary    26 Feb 2024 07:01  -  27 Feb 2024 07:00  --------------------------------------------------------  IN: 1040 mL / OUT: 850 mL / NET: 190 mL          GENERAL: NAD  EYES:  conjunctiva and sclera clear  ENMT: s/p trach  Cardiovascular: Normal S1 S2, No JVD, No murmurs, No edema  Respiratory: Lungs clear to auscultation	  Gastrointestinal:  Soft, s/p PEG  Extremities: No edema                                     8.4    6.51  )-----------( 406      ( 27 Feb 2024 05:30 )             27.4     02-27    149<H>  |  108<H>  |  34<H>  ----------------------------<  178<H>  3.7   |  30  |  1.34<H>    Ca    8.5      27 Feb 2024 05:30  Phos  2.9     02-27  Mg     2.20 02-27      proBNP:   Lipid Profile:   HgA1c:   TSH:     Consultant(s) Notes Reviewed:  [x ] YES  [ ] NO    Care Discussed with Consultants/Other Providers [ x] YES  [ ] NO    Imaging Personally Reviewed independently:  [x] YES  [ ] NO    All labs, radiologic studies, vitals, orders and medications list reviewed. Patient is seen and examined at bedside. Case discussed with medical team.

## 2024-02-27 NOTE — PROGRESS NOTE ADULT - ASSESSMENT
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 Course ultimately complicated by progressive WOB and O2 demand and patient intubated and transferred to MICU (1/22). Course further complicated by acute cholecystitis and s/p Perc Lynsey with IR on (1/26), HFrEF 38, pulmonary edema, superimposed PNA, failed extubation failed and prolonged vent time and s/p trach (2/13). Patient transferred to RCU (2/16) and s/p PEG (2/20)     NEUROLOGY   # Encephalopathy   - Hx of CVA with no residual deficits per family, however per family worsening confusion at home over the past 6 months (becoming disoriented to time) but prior to admission has been more confused, talking about seeing people that are not present.  - CTH (1/31) with no evidence of acute infarct  - Continue on ASA and brilinta  - Holding Neurontin, Memantine and Oxycodone in setting of somnolence    # ICA stenosis   - CTA NECK (1/31) with moderate to severe narrowing left internal carotid at the origin. Severe narrowing right internal carotid by a tandem lesion 1.5 cm above the bifurcation with a narrowed internal carotid beyond the lesion. Extensive calcified plaque both cavernous and supraclinoid carotid arteries with narrowing but no occlusion. Mild narrowing proximal right M1 segment. Moderate narrowing both vertebral V4 segments. No perfusion abnormality at the Tmax 6 second threshold, but moderate hypoperfusion in the right MCA territory at the 4 second threshold.   - Continue on ASA and brilinta    # Myoclonic jerks   - Hx of myoclonic jerks post CVAs   - EEG (1/18-2/6) negative for seizures  - Continue on keppra and per neuro wishes to wean, however family wishes to keep current dose as home medication.     # Depression   - Continue on lexapro per home medication   - Insomnic per family and melatonin added   - Supportive care     CARDIOLOGY   # Vasoplegic vs septic shock  # Hx of HTN   - Weaned off pressors in ICU and now with mild hypertension   - Continue on lopressor as below and monitor HR     # AFIB RVR   # Bifasicular Block with Medtronic PPM   - AFIB RVR episodes and PPM interrogated (2/20) by EP with similar episodes  - Previous admission in 6/2022 with cardiogenic syncope second to bifasicular block, however now with PPM and AV myron blockers ok.   - Continue on lopressor 12.5mg BID however replaced with coreg 6.25 second to HFrEF   - AC discussed at length however with recent GIB will hold for now     # HFrEF 38 likely stress induced?   # Mild to moderate AS   - ECHO (12/2023) with EF 62 with normal LVSF and mild to moderate AS   - ECHO (2/2024) with EF 38, moderate LVSD with global LV hypokinesis, mildly reduced RVSF (TAPSE 1.3), mild to moderate MR, mild AR, and moderate AS.   - Continue on coreg 6.25 and hydral 10    - Continue on diuresis by dose (lasix 40mg IVP given 2/25)  - Given moderate AS monitor BP closely and avoid hypertension   - Add isordil if able     # CAD with CABG   - CATH (5/2022) with dLAD 70, SVG graft to OM1 with 90 in stent stenosis s/p PCI and GEMMA placement, and LIMA graft to mLAD with no disease.   - Continue on ASA and brilinta    RESPIRATORY   # Acute respiratory failure second to SARS-COV-2 vs pulm edema vs PNA  - Presented with COVID complicated by sepsis second to acute lynsey and worsening respiratory failure second to pulmonary edema requiring intubation.   - Prolonged intubation and s/p tracheostomy (2/13)   - CT CHEST 1/16 with new small bilateral pleural effusions/ atelectasis/ patchy bilateral ground-glass opacities are consistent with pulmonary edema.  - Completed ABX and COVID regimen as below   - Continue on vent and initially tolerates SBT 15/5 however now with poor tolerance second to weakness vs volume   - Continue on nebs and hold further HTS given intermittent hemoptysis  - Continue on diuresis as above  - Thick sputum so will send repeat SCx (2/26)    # Mild hemoptysis likely from suction trauma   - s/p bronch (2/18) with no active bleed  - Hemoptysis improved with TXA and holding further HTS as above   - Tiny hemoptysis second to suction trauma imporving  - Careful with suctioning     HEENT   # L eye subconjunctival hemorrhage   - Continue on artifical tears and lacrilube   - Optho eval appreciated     GI  # Nutrition/ Dysphagia   - PEG placed by GI on 2/20 and tube feeds continued.   - Loose stools and banatrol continued     # UGIB   - EGD (1/2) with esophagitis and bleeding Dieulafoy's lesion s/p clips.   - Continue on PPI and carafate     # SMA calcification   - CTA demonstrating mesenteric fat stranding associated with ascending/transverse colon  - Diagnostic laparoscopy (12/24) with small bowel and visible colon viable, some inflammation of omentum in RUQ  - SMA Angiogram and stent placed (12/25).   - Continue on ASA and brilinta     # Acute Cholecystits   - CT (12/26) with distended GB with cholelithiasis and sludge   - HIDA (12/29) POSITIVE for acute cholecystitis   - Case discussed with IR at length and s/p PERC LYNSEY (1/26)   - Completed zosyn course (12/24-12/31)     / RENAL   # CKD   - CRE at baseline   - Monitor renal function and UOP     # Hypernatremia   -  Q6H added with improvement   - Monitor closely with diuresis as above     INFECTIOUS DISEASE   # COVID with superimposed PNA   - COVID POSITIVE (12/23) and completed remdesivir x 1 dose and paxlovid course.   - WOB worsened as above requiring intubation and cultures negative. Zosyn completed empirically however hypothermic (2/11) and BCx, UA, RVP and BAL negative.   - Completed additional zosyn (2/12-2/19) empirically   - Monitor oFF ABX     HEME  # AOCD   - Monitor HH   - DVT PPX with HSQ     ENDOCRINE   # DM2 A1C 9.3   - Continue on lantus 12 and ISS     SKIN/ TUBES   - Trach (2/13)   - PEG (2/20)   - PERC LYNSEY (1/26 - )     ETHICS   - FULL CODE     DISPO - vent facility and family aware. SW aware to send out to facilities and family to discuss on DISPO plan.  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 Course ultimately complicated by progressive WOB and O2 demand and patient intubated and transferred to MICU (1/22). Course further complicated by acute cholecystitis and s/p Perc Lynsey with IR on (1/26), HFrEF 38, pulmonary edema, superimposed PNA, failed extubation failed and prolonged vent time and s/p trach (2/13). Patient transferred to RCU (2/16) and s/p PEG (2/20)     NEUROLOGY   # Encephalopathy   - Hx of CVA with no residual deficits per family, however per family worsening confusion at home over the past 6 months (becoming disoriented to time) but prior to admission has been more confused, talking about seeing people that are not present.  - CTH (1/31) with no evidence of acute infarct  - Continue on ASA and brilinta  - Holding Neurontin, Memantine and Oxycodone in setting of somnolence    # ICA stenosis   - CTA NECK (1/31) with moderate to severe narrowing left internal carotid at the origin. Severe narrowing right internal carotid by a tandem lesion 1.5 cm above the bifurcation with a narrowed internal carotid beyond the lesion. Extensive calcified plaque both cavernous and supraclinoid carotid arteries with narrowing but no occlusion. Mild narrowing proximal right M1 segment. Moderate narrowing both vertebral V4 segments. No perfusion abnormality at the Tmax 6 second threshold, but moderate hypoperfusion in the right MCA territory at the 4 second threshold.   - Continue on ASA and brilinta    # Myoclonic jerks   - Hx of myoclonic jerks post CVAs   - EEG (1/18-2/6) negative for seizures  - Continue on keppra and per neuro wishes to wean, however family wishes to keep current dose as home medication.     # Depression   - Continue on lexapro per home medication   - Insomnic per family and melatonin added   - Supportive care     CARDIOLOGY   # Vasoplegic vs septic shock  # Hx of HTN   - Weaned off pressors in ICU and now with mild hypertension   - Continue on lopressor as below and monitor HR     # AFIB RVR   # Bifasicular Block with Medtronic PPM   - AFIB RVR episodes and PPM interrogated (2/20) by EP with similar episodes  - Previous admission in 6/2022 with cardiogenic syncope second to bifasicular block, however now with PPM and AV myron blockers ok.   - Continue on lopressor 12.5mg BID however replaced with coreg 6.25 second to HFrEF   - AC discussed at length however with recent GIB will hold for now     # HFrEF 38 likely stress induced?   # Mild to moderate AS   - ECHO (12/2023) with EF 62 with normal LVSF and mild to moderate AS   - ECHO (2/2024) with EF 38, moderate LVSD with global LV hypokinesis, mildly reduced RVSF (TAPSE 1.3), mild to moderate MR, mild AR, and moderate AS.   - Continue on coreg 6.25 and hydral 10    - Continue on diuresis by dose (lasix 40mg IVP given 2/25)  - Given moderate AS monitor BP closely and avoid hypertension   - Add isordil if able     # CAD with CABG   - CATH (5/2022) with dLAD 70, SVG graft to OM1 with 90 in stent stenosis s/p PCI and GEMMA placement, and LIMA graft to mLAD with no disease.   - Continue on ASA and brilinta    RESPIRATORY   # Acute respiratory failure second to SARS-COV-2 vs pulm edema vs PNA  - Presented with COVID complicated by sepsis second to acute lynsey and worsening respiratory failure second to pulmonary edema requiring intubation.   - Prolonged intubation and s/p tracheostomy (2/13)   - CT CHEST 1/16 with new small bilateral pleural effusions/ atelectasis/ patchy bilateral ground-glass opacities are consistent with pulmonary edema.  - Completed ABX and COVID regimen as below   - Continue on vent and initially tolerates SBT 15/5 however now with poor tolerance second to weakness vs volume   - Continue on nebs and hold further HTS given intermittent hemoptysis  - Continue on diuresis as above  - Thick sputum and repeat SCx (2/26) growing GNR    # Mild hemoptysis likely from suction trauma   - s/p bronch (2/18) with no active bleed  - Hemoptysis improved with TXA and holding further HTS as above   - Tiny hemoptysis second to suction trauma imporving  - Careful with suctioning     HEENT   # L eye subconjunctival hemorrhage   - Continue on artifical tears and lacrilube   - Optho eval appreciated     GI  # Nutrition/ Dysphagia   - PEG placed by GI on 2/20 and tube feeds continued.   - Loose stools and banatrol continued     # UGIB   - EGD (1/2) with esophagitis and bleeding Dieulafoy's lesion s/p clips.   - Continue on PPI and carafate     # SMA calcification   - CTA demonstrating mesenteric fat stranding associated with ascending/transverse colon  - Diagnostic laparoscopy (12/24) with small bowel and visible colon viable, some inflammation of omentum in RUQ  - SMA Angiogram and stent placed (12/25).   - Continue on ASA and brilinta     # Acute Cholecystits   - CT (12/26) with distended GB with cholelithiasis and sludge   - HIDA (12/29) POSITIVE for acute cholecystitis   - Case discussed with IR at length and s/p PERC LYNESY (1/26)   - Completed zosyn course (12/24-12/31)     / RENAL   # CKD   - CRE at baseline   - Monitor renal function and UOP     # Hypernatremia   -  Q6H added with improvement - increased to 300 Q4H (2/27)  - Monitor closely with diuresis as above     INFECTIOUS DISEASE   # COVID with superimposed PNA   - COVID POSITIVE (12/23) and completed remdesivir x 1 dose and paxlovid course.   - WOB worsened as above requiring intubation and cultures negative. Zosyn completed empirically however hypothermic (2/11) and BCx, UA, RVP and BAL negative.   - Completed additional zosyn (2/12-2/19) empirically   - Monitor oFF ABX     HEME  # AOCD   - Monitor HH   - DVT PPX with HSQ     ENDOCRINE   # DM2 A1C 9.3   - Continue on lantus 12 and ISS     SKIN/ TUBES   - Trach (2/13)   - PEG (2/20)   - PERC LYNSEY (1/26 - )     ETHICS   - FULL CODE     DISPO - vent facility and family aware. SW aware to send out to facilities and family to discuss on DISPO plan.  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 Course ultimately complicated by progressive WOB and O2 demand and patient intubated and transferred to MICU (1/22). Course further complicated by acute cholecystitis and s/p Perc Lynsey with IR on (1/26), HFrEF 38, pulmonary edema, superimposed PNA, failed extubation failed and prolonged vent time and s/p trach (2/13). Patient transferred to RCU (2/16) and s/p PEG (2/20)     NEUROLOGY   # Encephalopathy   - Hx of CVA with no residual deficits per family, however per family worsening confusion at home over the past 6 months (becoming disoriented to time) but prior to admission has been more confused, talking about seeing people that are not present.  - CTH (1/31) with no evidence of acute infarct  - Continue on ASA and brilinta  - Holding Neurontin, Memantine and Oxycodone in setting of somnolence    # ICA stenosis   - CTA NECK (1/31) with moderate to severe narrowing left internal carotid at the origin. Severe narrowing right internal carotid by a tandem lesion 1.5 cm above the bifurcation with a narrowed internal carotid beyond the lesion. Extensive calcified plaque both cavernous and supraclinoid carotid arteries with narrowing but no occlusion. Mild narrowing proximal right M1 segment. Moderate narrowing both vertebral V4 segments. No perfusion abnormality at the Tmax 6 second threshold, but moderate hypoperfusion in the right MCA territory at the 4 second threshold.   - Continue on ASA and brilinta    # Myoclonic jerks   - Hx of myoclonic jerks post CVAs   - EEG (1/18-2/6) negative for seizures  - Continue on keppra and per neuro wishes to wean, however family wishes to keep current dose as home medication.     # Depression   - Continue on lexapro per home medication   - Insomnic per family and melatonin added   - Supportive care     CARDIOLOGY   # Vasoplegic vs septic shock  # Hx of HTN   - Weaned off pressors in ICU and now with mild hypertension   - Continue on lopressor as below and monitor HR     # AFIB RVR   # Bifasicular Block with Medtronic PPM   - AFIB RVR episodes and PPM interrogated (2/20) by EP with similar episodes  - Previous admission in 6/2022 with cardiogenic syncope second to bifasicular block, however now with PPM and AV myron blockers ok.   - Continue on lopressor 12.5mg BID however replaced with coreg 6.25 second to HFrEF   - AC discussed at length however with recent GIB will hold for now     # HFrEF 38 likely stress induced?   # Mild to moderate AS   - ECHO (12/2023) with EF 62 with normal LVSF and mild to moderate AS   - ECHO (2/2024) with EF 38, moderate LVSD with global LV hypokinesis, mildly reduced RVSF (TAPSE 1.3), mild to moderate MR, mild AR, and moderate AS.   - Continue on coreg 6.25 and hydral 10    - Continue on diuresis by dose (lasix 40mg IVP given 2/25)  - Given moderate AS monitor BP closely and avoid hypertension   - Add isordil if able     # CAD with CABG   - CATH (5/2022) with dLAD 70, SVG graft to OM1 with 90 in stent stenosis s/p PCI and GEMMA placement, and LIMA graft to mLAD with no disease.   - Continue on ASA and brilinta    RESPIRATORY   # Acute respiratory failure second to SARS-COV-2 vs pulm edema vs PNA  - Presented with COVID complicated by sepsis second to acute lynsey and worsening respiratory failure second to pulmonary edema requiring intubation.   - Prolonged intubation and s/p tracheostomy (2/13)   - CT CHEST 1/16 with new small bilateral pleural effusions/ atelectasis/ patchy bilateral ground-glass opacities are consistent with pulmonary edema.  - Completed ABX and COVID regimen as below   - Continue on vent and initially tolerates SBT 15/5 however now with poor tolerance second to weakness vs volume   - Continue on nebs and hold further HTS given intermittent hemoptysis  - Continue on diuresis as above  - Thick sputum and repeat SCx (2/26) growing GNR    # Mild hemoptysis likely from suction trauma   - s/p bronch (2/18) with no active bleed  - Hemoptysis improved with TXA and holding further HTS as above   - Tiny hemoptysis second to suction trauma imporving  - Careful with suctioning     HEENT   # L eye subconjunctival hemorrhage   - Continue on artifical tears and lacrilube   - Optho eval appreciated     GI  # Nutrition/ Dysphagia   - PEG placed by GI on 2/20 and tube feeds continued.   - Loose stools and banatrol continued     # UGIB   - EGD (1/2) with esophagitis and bleeding Dieulafoy's lesion s/p clips.   - Continue on PPI and carafate     # SMA calcification   - CTA demonstrating mesenteric fat stranding associated with ascending/transverse colon  - Diagnostic laparoscopy (12/24) with small bowel and visible colon viable, some inflammation of omentum in RUQ  - SMA Angiogram and stent placed (12/25).   - Continue on ASA and brilinta     # Acute Cholecystits   - CT (12/26) with distended GB with cholelithiasis and sludge   - HIDA (12/29) POSITIVE for acute cholecystitis   - Case discussed with IR at length and s/p PERC LYNSEY (1/26)   - Completed zosyn course (12/24-12/31)     / RENAL   # CKD   - CRE at baseline   - Monitor renal function and UOP     # Hypernatremia   -  Q6H added with improvement - increased to 300 Q4H (2/27)  - Monitor closely with diuresis as above     INFECTIOUS DISEASE   # COVID with superimposed PNA   - COVID POSITIVE (12/23) and completed remdesivir x 1 dose and paxlovid course.   - WOB worsened as above requiring intubation and cultures negative. Zosyn completed empirically however hypothermic (2/11) and BCx, UA, RVP and BAL negative.   - Completed additional zosyn (2/12-2/19) empirically   - Thick secretions in s/o new fever and SCX (2/26) with GNR - restart Zosyn (2/27 - )    HEME  # AOCD   - Monitor HH   - DVT PPX with HSQ     ENDOCRINE   # DM2 A1C 9.3   - Continue on lantus 12 and ISS     SKIN/ TUBES   - Trach (2/13)   - PEG (2/20)   - PERC LYNSEY (1/26 - )     ETHICS   - FULL CODE     DISPO - vent facility and family aware. SW aware to send out to facilities and family to discuss on DISPO plan.

## 2024-02-27 NOTE — PROVIDER CONTACT NOTE (OTHER) - DATE AND TIME:
05-Jan-2024 18:35
23-Dec-2023 23:30
23-Jan-2024 00:00
27-Feb-2024 17:00
08-Jan-2024 09:02
21-Feb-2024 19:02
08-Jan-2024 17:20
21-Feb-2024 18:50
17-Feb-2024 12:00
08-Jan-2024 16:00
06-Jan-2024 18:12
06-Jan-2024 19:55
06-Jan-2024 13:30
06-Jan-2024 16:54
24-Dec-2023 11:00

## 2024-02-27 NOTE — PROGRESS NOTE ADULT - SUBJECTIVE AND OBJECTIVE BOX
CHIEF COMPLAINT:Patient is a 89y old  Male who presents with a chief complaint of COVID19, Chest pain (26 Feb 2024 14:11)      INTERVAL EVENTS:     ROS: Seen by bedside during AM rounds     OBJECTIVE:  ICU Vital Signs Last 24 Hrs  T(C): 36.8 (27 Feb 2024 05:40), Max: 36.9 (26 Feb 2024 12:00)  T(F): 98.2 (27 Feb 2024 05:40), Max: 98.5 (26 Feb 2024 18:00)  HR: 82 (27 Feb 2024 08:36) (80 - 97)  BP: 128/67 (27 Feb 2024 05:40) (114/75 - 137/88)  BP(mean): --  ABP: --  ABP(mean): --  RR: 18 (27 Feb 2024 05:40) (18 - 19)  SpO2: 97% (27 Feb 2024 08:36) (97% - 100%)    O2 Parameters below as of 27 Feb 2024 08:36  Patient On (Oxygen Delivery Method): ventilator          Mode: AC/ CMV (Assist Control/ Continuous Mandatory Ventilation), RR (machine): 12, TV (machine): 450, FiO2: 30, PEEP: 5, ITime: 0.6, MAP: 15, PIP: 42    02-26 @ 07:01  -  02-27 @ 07:00  --------------------------------------------------------  IN: 1040 mL / OUT: 850 mL / NET: 190 mL      CAPILLARY BLOOD GLUCOSE      POCT Blood Glucose.: 185 mg/dL (27 Feb 2024 05:28)      PHYSICAL EXAM:  General:   HEENT:   Lymph Nodes:  Neck:   Respiratory:   Cardiovascular:   Abdomen:   Extremities:   Skin:   Neurological:  Psychiatry:    Mode: AC/ CMV (Assist Control/ Continuous Mandatory Ventilation)  RR (machine): 12  TV (machine): 450  FiO2: 30  PEEP: 5  ITime: 0.6  MAP: 15  PIP: 42      HOSPITAL MEDICATIONS:  MEDICATIONS  (STANDING):  albuterol/ipratropium for Nebulization 3 milliLiter(s) Nebulizer every 12 hours  artificial  tears Solution 1 Drop(s) Left EYE four times a day  aspirin  chewable 81 milliGRAM(s) Enteral Tube daily  carvedilol 6.25 milliGRAM(s) Oral every 12 hours  chlorhexidine 0.12% Liquid 15 milliLiter(s) Oral Mucosa every 12 hours  chlorhexidine 2% Cloths 1 Application(s) Topical daily  dextrose 5%. 1000 milliLiter(s) (50 mL/Hr) IV Continuous <Continuous>  dextrose 5%. 1000 milliLiter(s) (100 mL/Hr) IV Continuous <Continuous>  dextrose 50% Injectable 25 Gram(s) IV Push once  dextrose 50% Injectable 12.5 Gram(s) IV Push once  dextrose 50% Injectable 25 Gram(s) IV Push once  doxazosin 2 milliGRAM(s) Oral at bedtime  escitalopram Solution 10 milliGRAM(s) Oral daily  glucagon  Injectable 1 milliGRAM(s) IntraMuscular once  heparin   Injectable 5000 Unit(s) SubCutaneous every 8 hours  hydrALAZINE 10 milliGRAM(s) Oral every 8 hours  insulin glargine Injectable (LANTUS) 12 Unit(s) SubCutaneous <User Schedule>  insulin lispro (ADMELOG) corrective regimen sliding scale   SubCutaneous every 6 hours  levETIRAcetam  Solution 500 milliGRAM(s) Oral two times a day  melatonin 6 milliGRAM(s) Oral at bedtime  pantoprazole   Suspension 40 milliGRAM(s) Oral every 12 hours  petrolatum Ophthalmic Ointment 1 Application(s) Left EYE at bedtime  sucralfate suspension 1 Gram(s) Enteral Tube two times a day  ticagrelor 60 milliGRAM(s) Oral every 12 hours    MEDICATIONS  (PRN):  acetaminophen   Oral Liquid .. 650 milliGRAM(s) Oral every 6 hours PRN Temp greater or equal to 38C (100.4F), Mild Pain (1 - 3), Moderate Pain (4 - 6)  dextrose Oral Gel 15 Gram(s) Oral once PRN Blood Glucose LESS THAN 70 milliGRAM(s)/deciliter      LABS:                        8.4    6.51  )-----------( 406      ( 27 Feb 2024 05:30 )             27.4     02-27    149<H>  |  108<H>  |  34<H>  ----------------------------<  178<H>  3.7   |  30  |  1.34<H>    Ca    8.5      27 Feb 2024 05:30  Phos  2.9     02-27  Mg     2.20     02-27        Urinalysis Basic - ( 27 Feb 2024 05:30 )    Color: x / Appearance: x / SG: x / pH: x  Gluc: 178 mg/dL / Ketone: x  / Bili: x / Urobili: x   Blood: x / Protein: x / Nitrite: x   Leuk Esterase: x / RBC: x / WBC x   Sq Epi: x / Non Sq Epi: x / Bacteria: x         CHIEF COMPLAINT:Patient is a 89y old  Male who presents with a chief complaint of COVID19, Chest pain (26 Feb 2024 14:11)      INTERVAL EVENTS:     ROS: Seen by bedside during AM rounds     OBJECTIVE:  ICU Vital Signs Last 24 Hrs  T(C): 36.8 (27 Feb 2024 05:40), Max: 36.9 (26 Feb 2024 12:00)  T(F): 98.2 (27 Feb 2024 05:40), Max: 98.5 (26 Feb 2024 18:00)  HR: 82 (27 Feb 2024 08:36) (80 - 97)  BP: 128/67 (27 Feb 2024 05:40) (114/75 - 137/88)  BP(mean): --  ABP: --  ABP(mean): --  RR: 18 (27 Feb 2024 05:40) (18 - 19)  SpO2: 97% (27 Feb 2024 08:36) (97% - 100%)    O2 Parameters below as of 27 Feb 2024 08:36  Patient On (Oxygen Delivery Method): ventilator          Mode: AC/ CMV (Assist Control/ Continuous Mandatory Ventilation), RR (machine): 12, TV (machine): 450, FiO2: 30, PEEP: 5, ITime: 0.6, MAP: 15, PIP: 42    02-26 @ 07:01  -  02-27 @ 07:00  --------------------------------------------------------  IN: 1040 mL / OUT: 850 mL / NET: 190 mL      CAPILLARY BLOOD GLUCOSE      POCT Blood Glucose.: 185 mg/dL (27 Feb 2024 05:28)      PHYSICAL EXAM:  General: NAD   Neck: (+) Trach tube noted, site c/d/i.  Cards: S1/S2, no murmurs   Pulm: Some mild expiratory coarse wheezing noted. No resp distress.   Abdomen: Soft, NTND. (+) PEG noted, site c/d/i. (+)Lynsey drain in anterior abdomen, some dark bilious fluid in collection bag.   Extremities: 1-2+ b/l LE pitting edema. Extremities warm to touch.   Neurology: awake/alert. Not following commands. Tracks intermittently.   Skin: warm to touch, color appropriate for ethnicity. Refer to RN assessment for further details.    Mode: AC/ CMV (Assist Control/ Continuous Mandatory Ventilation)  RR (machine): 12  TV (machine): 450  FiO2: 30  PEEP: 5  ITime: 0.6  MAP: 15  PIP: 42      HOSPITAL MEDICATIONS:  MEDICATIONS  (STANDING):  albuterol/ipratropium for Nebulization 3 milliLiter(s) Nebulizer every 12 hours  artificial  tears Solution 1 Drop(s) Left EYE four times a day  aspirin  chewable 81 milliGRAM(s) Enteral Tube daily  carvedilol 6.25 milliGRAM(s) Oral every 12 hours  chlorhexidine 0.12% Liquid 15 milliLiter(s) Oral Mucosa every 12 hours  chlorhexidine 2% Cloths 1 Application(s) Topical daily  dextrose 5%. 1000 milliLiter(s) (50 mL/Hr) IV Continuous <Continuous>  dextrose 5%. 1000 milliLiter(s) (100 mL/Hr) IV Continuous <Continuous>  dextrose 50% Injectable 25 Gram(s) IV Push once  dextrose 50% Injectable 12.5 Gram(s) IV Push once  dextrose 50% Injectable 25 Gram(s) IV Push once  doxazosin 2 milliGRAM(s) Oral at bedtime  escitalopram Solution 10 milliGRAM(s) Oral daily  glucagon  Injectable 1 milliGRAM(s) IntraMuscular once  heparin   Injectable 5000 Unit(s) SubCutaneous every 8 hours  hydrALAZINE 10 milliGRAM(s) Oral every 8 hours  insulin glargine Injectable (LANTUS) 12 Unit(s) SubCutaneous <User Schedule>  insulin lispro (ADMELOG) corrective regimen sliding scale   SubCutaneous every 6 hours  levETIRAcetam  Solution 500 milliGRAM(s) Oral two times a day  melatonin 6 milliGRAM(s) Oral at bedtime  pantoprazole   Suspension 40 milliGRAM(s) Oral every 12 hours  petrolatum Ophthalmic Ointment 1 Application(s) Left EYE at bedtime  sucralfate suspension 1 Gram(s) Enteral Tube two times a day  ticagrelor 60 milliGRAM(s) Oral every 12 hours    MEDICATIONS  (PRN):  acetaminophen   Oral Liquid .. 650 milliGRAM(s) Oral every 6 hours PRN Temp greater or equal to 38C (100.4F), Mild Pain (1 - 3), Moderate Pain (4 - 6)  dextrose Oral Gel 15 Gram(s) Oral once PRN Blood Glucose LESS THAN 70 milliGRAM(s)/deciliter      LABS:                        8.4    6.51  )-----------( 406      ( 27 Feb 2024 05:30 )             27.4     02-27    149<H>  |  108<H>  |  34<H>  ----------------------------<  178<H>  3.7   |  30  |  1.34<H>    Ca    8.5      27 Feb 2024 05:30  Phos  2.9     02-27  Mg     2.20     02-27        Urinalysis Basic - ( 27 Feb 2024 05:30 )    Color: x / Appearance: x / SG: x / pH: x  Gluc: 178 mg/dL / Ketone: x  / Bili: x / Urobili: x   Blood: x / Protein: x / Nitrite: x   Leuk Esterase: x / RBC: x / WBC x   Sq Epi: x / Non Sq Epi: x / Bacteria: x         CHIEF COMPLAINT:Patient is a 89y old  Male who presents with a chief complaint of COVID19, Chest pain (26 Feb 2024 14:11)      INTERVAL EVENTS: no overnight events noted. Sodium uptrending, increased FWF. Holding diuresis for now. Became tachypneic/anxious appearing with CPAP trial today .     ROS: Seen by bedside during AM rounds     OBJECTIVE:  ICU Vital Signs Last 24 Hrs  T(C): 36.8 (27 Feb 2024 05:40), Max: 36.9 (26 Feb 2024 12:00)  T(F): 98.2 (27 Feb 2024 05:40), Max: 98.5 (26 Feb 2024 18:00)  HR: 82 (27 Feb 2024 08:36) (80 - 97)  BP: 128/67 (27 Feb 2024 05:40) (114/75 - 137/88)  BP(mean): --  ABP: --  ABP(mean): --  RR: 18 (27 Feb 2024 05:40) (18 - 19)  SpO2: 97% (27 Feb 2024 08:36) (97% - 100%)    O2 Parameters below as of 27 Feb 2024 08:36  Patient On (Oxygen Delivery Method): ventilator          Mode: AC/ CMV (Assist Control/ Continuous Mandatory Ventilation), RR (machine): 12, TV (machine): 450, FiO2: 30, PEEP: 5, ITime: 0.6, MAP: 15, PIP: 42    02-26 @ 07:01  -  02-27 @ 07:00  --------------------------------------------------------  IN: 1040 mL / OUT: 850 mL / NET: 190 mL      CAPILLARY BLOOD GLUCOSE      POCT Blood Glucose.: 185 mg/dL (27 Feb 2024 05:28)      PHYSICAL EXAM:  General: NAD   Neck: (+) Trach tube noted, site c/d/i.  Cards: S1/S2, no murmurs   Pulm: Some mild expiratory coarse wheezing noted. No resp distress.   Abdomen: Soft, NTND. (+) PEG noted, site c/d/i. (+)Lynsey drain in anterior abdomen, some dark bilious fluid in collection bag.   Extremities: 1-2+ b/l LE pitting edema. Extremities warm to touch.   Neurology: awake/alert. Not following commands. Tracks intermittently.   Skin: warm to touch, color appropriate for ethnicity. Refer to RN assessment for further details.    Mode: AC/ CMV (Assist Control/ Continuous Mandatory Ventilation)  RR (machine): 12  TV (machine): 450  FiO2: 30  PEEP: 5  ITime: 0.6  MAP: 15  PIP: 42      HOSPITAL MEDICATIONS:  MEDICATIONS  (STANDING):  albuterol/ipratropium for Nebulization 3 milliLiter(s) Nebulizer every 12 hours  artificial  tears Solution 1 Drop(s) Left EYE four times a day  aspirin  chewable 81 milliGRAM(s) Enteral Tube daily  carvedilol 6.25 milliGRAM(s) Oral every 12 hours  chlorhexidine 0.12% Liquid 15 milliLiter(s) Oral Mucosa every 12 hours  chlorhexidine 2% Cloths 1 Application(s) Topical daily  dextrose 5%. 1000 milliLiter(s) (50 mL/Hr) IV Continuous <Continuous>  dextrose 5%. 1000 milliLiter(s) (100 mL/Hr) IV Continuous <Continuous>  dextrose 50% Injectable 25 Gram(s) IV Push once  dextrose 50% Injectable 12.5 Gram(s) IV Push once  dextrose 50% Injectable 25 Gram(s) IV Push once  doxazosin 2 milliGRAM(s) Oral at bedtime  escitalopram Solution 10 milliGRAM(s) Oral daily  glucagon  Injectable 1 milliGRAM(s) IntraMuscular once  heparin   Injectable 5000 Unit(s) SubCutaneous every 8 hours  hydrALAZINE 10 milliGRAM(s) Oral every 8 hours  insulin glargine Injectable (LANTUS) 12 Unit(s) SubCutaneous <User Schedule>  insulin lispro (ADMELOG) corrective regimen sliding scale   SubCutaneous every 6 hours  levETIRAcetam  Solution 500 milliGRAM(s) Oral two times a day  melatonin 6 milliGRAM(s) Oral at bedtime  pantoprazole   Suspension 40 milliGRAM(s) Oral every 12 hours  petrolatum Ophthalmic Ointment 1 Application(s) Left EYE at bedtime  sucralfate suspension 1 Gram(s) Enteral Tube two times a day  ticagrelor 60 milliGRAM(s) Oral every 12 hours    MEDICATIONS  (PRN):  acetaminophen   Oral Liquid .. 650 milliGRAM(s) Oral every 6 hours PRN Temp greater or equal to 38C (100.4F), Mild Pain (1 - 3), Moderate Pain (4 - 6)  dextrose Oral Gel 15 Gram(s) Oral once PRN Blood Glucose LESS THAN 70 milliGRAM(s)/deciliter      LABS:                        8.4    6.51  )-----------( 406      ( 27 Feb 2024 05:30 )             27.4     02-27    149<H>  |  108<H>  |  34<H>  ----------------------------<  178<H>  3.7   |  30  |  1.34<H>    Ca    8.5      27 Feb 2024 05:30  Phos  2.9     02-27  Mg     2.20     02-27        Urinalysis Basic - ( 27 Feb 2024 05:30 )    Color: x / Appearance: x / SG: x / pH: x  Gluc: 178 mg/dL / Ketone: x  / Bili: x / Urobili: x   Blood: x / Protein: x / Nitrite: x   Leuk Esterase: x / RBC: x / WBC x   Sq Epi: x / Non Sq Epi: x / Bacteria: x

## 2024-02-27 NOTE — PROGRESS NOTE ADULT - NS ATTEND AMEND GEN_ALL_CORE FT
agree with above  cardiology f/ u apprecitaed  no lasix today  increased free water for hypernatremia  becomes very anxious immediately with weaning, will consider anxiolytic prior to wean attempt tomorrow

## 2024-02-27 NOTE — PROGRESS NOTE ADULT - SUBJECTIVE AND OBJECTIVE BOX
NEPHROLOGY-[facility] [phone]        Patient seen and examined trach to vent.         MEDICATIONS  (STANDING):  albuterol/ipratropium for Nebulization 3 milliLiter(s) Nebulizer every 12 hours  artificial  tears Solution 1 Drop(s) Left EYE four times a day  aspirin  chewable 81 milliGRAM(s) Enteral Tube daily  carvedilol 6.25 milliGRAM(s) Oral every 12 hours  chlorhexidine 0.12% Liquid 15 milliLiter(s) Oral Mucosa every 12 hours  chlorhexidine 2% Cloths 1 Application(s) Topical daily  dextrose 5%. 1000 milliLiter(s) (100 mL/Hr) IV Continuous <Continuous>  dextrose 5%. 1000 milliLiter(s) (50 mL/Hr) IV Continuous <Continuous>  dextrose 50% Injectable 25 Gram(s) IV Push once  dextrose 50% Injectable 12.5 Gram(s) IV Push once  dextrose 50% Injectable 25 Gram(s) IV Push once  doxazosin 2 milliGRAM(s) Oral at bedtime  escitalopram Solution 10 milliGRAM(s) Oral daily  glucagon  Injectable 1 milliGRAM(s) IntraMuscular once  heparin   Injectable 5000 Unit(s) SubCutaneous every 8 hours  hydrALAZINE 10 milliGRAM(s) Oral every 8 hours  insulin glargine Injectable (LANTUS) 12 Unit(s) SubCutaneous <User Schedule>  insulin lispro (ADMELOG) corrective regimen sliding scale   SubCutaneous every 6 hours  levETIRAcetam  Solution 500 milliGRAM(s) Oral two times a day  melatonin 6 milliGRAM(s) Oral at bedtime  pantoprazole   Suspension 40 milliGRAM(s) Oral every 12 hours  petrolatum Ophthalmic Ointment 1 Application(s) Left EYE at bedtime  sucralfate suspension 1 Gram(s) Enteral Tube two times a day  ticagrelor 60 milliGRAM(s) Oral every 12 hours      VITAL:  T(C): , Max: 36.9 (02-26-24 @ 12:00)  T(F): , Max: 98.4 (02-26-24 @ 12:00)  HR: 93 (02-26-24 @ 12:00)  BP: 137/88 (02-26-24 @ 12:00)  BP(mean): --  RR: 19 (02-26-24 @ 12:00)  SpO2: 99% (02-26-24 @ 12:00)  Wt(kg): --    I and O's:    02-25 @ 07:01  -  02-26 @ 07:00  --------------------------------------------------------  IN: 2140 mL / OUT: 2150 mL / NET: -10 mL        PHYSICAL EXAM:  Constitutional: NAD trach to vent   HEENT: +trach  Respiratory: coarse bs   Cardiovascular: normal s1s2  Gastrointestinal: BS+, soft, NT/ND +PEG  Extremities:+ peripheral edema  :  + external catheter   Skin: No rashes      LABS:                        8.7    8.17  )-----------( 402      ( 26 Feb 2024 05:40 )             29.0     02-26    147<H>  |  107  |  34<H>  ----------------------------<  175<H>  3.5   |  30  |  1.40<H>    Ca    8.8      26 Feb 2024 05:40  Phos  2.7     02-26  Mg     2.00     02-26            Urine Studies:  Urinalysis Basic - ( 26 Feb 2024 05:40 )    Color: x / Appearance: x / SG: x / pH: x  Gluc: 175 mg/dL / Ketone: x  / Bili: x / Urobili: x   Blood: x / Protein: x / Nitrite: x   Leuk Esterase: x / RBC: x / WBC x   Sq Epi: x / Non Sq Epi: x / Bacteria: x      IMPRESSION:  89M w/ DM2, HTN, CVA, PAD, CKD3, and CAD-CABG, 12/23/23 p/w COVID19 infection, c/b respiratory failure/hypotension; now s/p tracheostomy    (1)Renal - CKD3; superimposed mild prerenal azotemia - numbers fluctuating based on hemodynamic status  (2)Hypokalemia - improved s/p repletion   (3)CV - now off pressors and midodrine, BP improved/stable    (4)Pulm - vent-dependent   (5ID - afebrile, s/p zosyn   (6)GI - s/p PEG (2/20)  (7)Hypernatremia - possibly due to diuresis, on free water    RECOMMEND:   (1)A/w holding lasix  (2)give Free water 250cc q4h  (3)Continue meds for GFR 30-40ml/min      Elsamayela Nunez  [facility]  Office: [phone]   NEPHROLOGY-[facility] [phone]        Patient seen and examined trach to vent.         MEDICATIONS  (STANDING):  albuterol/ipratropium for Nebulization 3 milliLiter(s) Nebulizer every 12 hours  artificial  tears Solution 1 Drop(s) Left EYE four times a day  aspirin  chewable 81 milliGRAM(s) Enteral Tube daily  carvedilol 6.25 milliGRAM(s) Oral every 12 hours  chlorhexidine 0.12% Liquid 15 milliLiter(s) Oral Mucosa every 12 hours  chlorhexidine 2% Cloths 1 Application(s) Topical daily  dextrose 5%. 1000 milliLiter(s) (100 mL/Hr) IV Continuous <Continuous>  dextrose 5%. 1000 milliLiter(s) (50 mL/Hr) IV Continuous <Continuous>  dextrose 50% Injectable 25 Gram(s) IV Push once  dextrose 50% Injectable 12.5 Gram(s) IV Push once  dextrose 50% Injectable 25 Gram(s) IV Push once  doxazosin 2 milliGRAM(s) Oral at bedtime  escitalopram Solution 10 milliGRAM(s) Oral daily  glucagon  Injectable 1 milliGRAM(s) IntraMuscular once  heparin   Injectable 5000 Unit(s) SubCutaneous every 8 hours  hydrALAZINE 10 milliGRAM(s) Oral every 8 hours  insulin glargine Injectable (LANTUS) 12 Unit(s) SubCutaneous <User Schedule>  insulin lispro (ADMELOG) corrective regimen sliding scale   SubCutaneous every 6 hours  levETIRAcetam  Solution 500 milliGRAM(s) Oral two times a day  melatonin 6 milliGRAM(s) Oral at bedtime  pantoprazole   Suspension 40 milliGRAM(s) Oral every 12 hours  petrolatum Ophthalmic Ointment 1 Application(s) Left EYE at bedtime  sucralfate suspension 1 Gram(s) Enteral Tube two times a day  ticagrelor 60 milliGRAM(s) Oral every 12 hours      VITAL:  T(C): , Max: 36.9 (02-26-24 @ 12:00)  T(F): , Max: 98.4 (02-26-24 @ 12:00)  HR: 93 (02-26-24 @ 12:00)  BP: 137/88 (02-26-24 @ 12:00)  BP(mean): --  RR: 19 (02-26-24 @ 12:00)  SpO2: 99% (02-26-24 @ 12:00)  Wt(kg): --    I and O's:    02-25 @ 07:01  -  02-26 @ 07:00  --------------------------------------------------------  IN: 2140 mL / OUT: 2150 mL / NET: -10 mL        PHYSICAL EXAM:  Constitutional: NAD trach to vent   HEENT: +trach  Respiratory: coarse bs   Cardiovascular: normal s1s2  Gastrointestinal: BS+, soft, NT/ND +PEG  Extremities:+ peripheral edema  :  + external catheter   Skin: No rashes      LABS:                        8.7    8.17  )-----------( 402      ( 26 Feb 2024 05:40 )             29.0     02-26    147<H>  |  107  |  34<H>  ----------------------------<  175<H>  3.5   |  30  |  1.40<H>    Ca    8.8      26 Feb 2024 05:40  Phos  2.7     02-26  Mg     2.00     02-26            Urine Studies:  Urinalysis Basic - ( 26 Feb 2024 05:40 )    Color: x / Appearance: x / SG: x / pH: x  Gluc: 175 mg/dL / Ketone: x  / Bili: x / Urobili: x   Blood: x / Protein: x / Nitrite: x   Leuk Esterase: x / RBC: x / WBC x   Sq Epi: x / Non Sq Epi: x / Bacteria: x      IMPRESSION:  89M w/ DM2, HTN, CVA, PAD, CKD3, and CAD-CABG, 12/23/23 p/w COVID19 infection, c/b respiratory failure/hypotension; now s/p tracheostomy    (1)Renal - CKD3; superimposed mild prerenal azotemia - numbers fluctuating based on hemodynamic status  (2)Hypokalemia - improved s/p repletion   (3)CV - now off pressors and midodrine, BP improved/stable    (4)Pulm - vent-dependent   (5ID - afebrile, s/p zosyn   (6)GI - s/p PEG (2/20)  (7)Hypernatremia - possibly due to diuresis, on free water    RECOMMEND:   (1)a-w holding lasix , will reassess tomorrow   (2)give Free water 300 cc q4h  (3)Continue meds for GFR 30-40ml/min      Elsamayela Nunez  [facility]  Office: [phone]

## 2024-02-27 NOTE — PROGRESS NOTE ADULT - ASSESSMENT
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     EKG SR RBBB (old per family)     1) Hypoxia   - s/p bronch   - Rt pleural effusion   - held lasix given Hypernatremia and worsening creat   - f/u neuro recs MRI brain shows no acute disease  - s/p trach and PEG       2) CAD s/p CABG  - p/w chest pain. EKG non ischemic , trop -sera   - chest pains  Trop mildly elevated and trending down likely sec to kidney disease,   - holding brilinta, was on it for SMA stent placed in 12/2023, held 2/2 anticipation for PEG placement, restart when no further invasive procedures planned  -TTE 2/12 with  mod decrease LV systolic function, new. euvolemic on exam,on lasix PRN per RCU given current condition will medically manage likely stress induced     3) Covid +  - s/p remdesivir   - c/w supportive management   - t/t per primary team   -resolved    4) Abd distension   -CTA AP obtained which showed  partially occlusive calcified and non-calcified plaque just distal to take-off of the SMA, with estimated at least 75% luminal narrowing. Limited evaluation of its distal branches. 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   -on Protonix IV q 12      6) Afib  -hx of PAF  -no AC 2/2 GIB  -on coreg 6.25mg BID

## 2024-02-27 NOTE — PROVIDER CONTACT NOTE (OTHER) - NAME OF MD/NP/PA/DO NOTIFIED:
Betty Candelaria
DADA Hernandez
Tin GARLAND
Darrick Harrell
Vascular ACP Cesar
Abhishek Moore
Dr. Arnold Nolan
Elpidio Gil
Abhishek Moore
Betty Candelaria
Wade GARLAND
Jeffery Magallanes
Darrick Alexander
Karyn Saeed
Abhishek Moore

## 2024-02-27 NOTE — PROVIDER CONTACT NOTE (OTHER) - ACTION/TREATMENT ORDERED:
Reassess BP again in a hour. Night Shift RN was told to check BP in a hour again. Provider said they will order more BP medications if BP still elevated.
Provider aware, provider request PRN tylenol to be given and will look to see if labs need to be drawn.
STAT repeat CBC, coags, X-ray chest, and TXA nebulizer ordered.
Eddi Hernandez notified.
Provider said to give Metoprolol one hour earlier.
Provider is notified. Continue to monitor
Give Metoprolol IV push and reassess afterwards.
Provider aware of completed ekg and requested image via teams and RN instructed to wait for further orders.
Provider aware, and ordered EKG
Metoprolol 2.5mg ordered.
Recheck fingerstick at 12pm.
Provider said NG tube is safe to use. Tube feedings resumed.
ACP aware, EKG and Labs ordered
Minimize adhesive usage. Monitor skin tear.
tube feed held. Pt remains NPO. IVP metoprolol to be administered. Monitoring ongoing, Pt not in distress at this time. Sons at bedside. Safety maintained.

## 2024-02-28 LAB
-  AMOXICILLIN/CLAVULANIC ACID: SIGNIFICANT CHANGE UP
-  AMPICILLIN/SULBACTAM: SIGNIFICANT CHANGE UP
-  AMPICILLIN: SIGNIFICANT CHANGE UP
-  AZTREONAM: SIGNIFICANT CHANGE UP
-  CEFAZOLIN: SIGNIFICANT CHANGE UP
-  CEFEPIME: SIGNIFICANT CHANGE UP
-  CEFTRIAXONE: SIGNIFICANT CHANGE UP
-  CIPROFLOXACIN: SIGNIFICANT CHANGE UP
-  ERTAPENEM: SIGNIFICANT CHANGE UP
-  GENTAMICIN: SIGNIFICANT CHANGE UP
-  IMIPENEM: SIGNIFICANT CHANGE UP
-  LEVOFLOXACIN: SIGNIFICANT CHANGE UP
-  MEROPENEM: SIGNIFICANT CHANGE UP
-  PIPERACILLIN/TAZOBACTAM: SIGNIFICANT CHANGE UP
-  TOBRAMYCIN: SIGNIFICANT CHANGE UP
-  TRIMETHOPRIM/SULFAMETHOXAZOLE: SIGNIFICANT CHANGE UP
ANION GAP SERPL CALC-SCNC: 10 MMOL/L — SIGNIFICANT CHANGE UP (ref 7–14)
ANION GAP SERPL CALC-SCNC: 10 MMOL/L — SIGNIFICANT CHANGE UP (ref 7–14)
BASOPHILS # BLD AUTO: 0.07 K/UL — SIGNIFICANT CHANGE UP (ref 0–0.2)
BASOPHILS NFR BLD AUTO: 0.9 % — SIGNIFICANT CHANGE UP (ref 0–2)
BUN SERPL-MCNC: 36 MG/DL — HIGH (ref 7–23)
BUN SERPL-MCNC: 36 MG/DL — HIGH (ref 7–23)
CALCIUM SERPL-MCNC: 8.3 MG/DL — LOW (ref 8.4–10.5)
CALCIUM SERPL-MCNC: 8.4 MG/DL — SIGNIFICANT CHANGE UP (ref 8.4–10.5)
CHLORIDE SERPL-SCNC: 105 MMOL/L — SIGNIFICANT CHANGE UP (ref 98–107)
CHLORIDE SERPL-SCNC: 105 MMOL/L — SIGNIFICANT CHANGE UP (ref 98–107)
CO2 SERPL-SCNC: 30 MMOL/L — SIGNIFICANT CHANGE UP (ref 22–31)
CO2 SERPL-SCNC: 30 MMOL/L — SIGNIFICANT CHANGE UP (ref 22–31)
CREAT SERPL-MCNC: 1.3 MG/DL — SIGNIFICANT CHANGE UP (ref 0.5–1.3)
CREAT SERPL-MCNC: 1.32 MG/DL — HIGH (ref 0.5–1.3)
CULTURE RESULTS: ABNORMAL
CULTURE RESULTS: SIGNIFICANT CHANGE UP
EGFR: 51 ML/MIN/1.73M2 — LOW
EGFR: 52 ML/MIN/1.73M2 — LOW
EOSINOPHIL # BLD AUTO: 0.55 K/UL — HIGH (ref 0–0.5)
EOSINOPHIL NFR BLD AUTO: 7.1 % — HIGH (ref 0–6)
GLUCOSE BLDC GLUCOMTR-MCNC: 162 MG/DL — HIGH (ref 70–99)
GLUCOSE BLDC GLUCOMTR-MCNC: 178 MG/DL — HIGH (ref 70–99)
GLUCOSE BLDC GLUCOMTR-MCNC: 224 MG/DL — HIGH (ref 70–99)
GLUCOSE BLDC GLUCOMTR-MCNC: 231 MG/DL — HIGH (ref 70–99)
GLUCOSE SERPL-MCNC: 158 MG/DL — HIGH (ref 70–99)
GLUCOSE SERPL-MCNC: 206 MG/DL — HIGH (ref 70–99)
HCT VFR BLD CALC: 28 % — LOW (ref 39–50)
HGB BLD-MCNC: 8.4 G/DL — LOW (ref 13–17)
IANC: 5.27 K/UL — SIGNIFICANT CHANGE UP (ref 1.8–7.4)
IMM GRANULOCYTES NFR BLD AUTO: 0.4 % — SIGNIFICANT CHANGE UP (ref 0–0.9)
LYMPHOCYTES # BLD AUTO: 1.34 K/UL — SIGNIFICANT CHANGE UP (ref 1–3.3)
LYMPHOCYTES # BLD AUTO: 17.3 % — SIGNIFICANT CHANGE UP (ref 13–44)
MAGNESIUM SERPL-MCNC: 2.2 MG/DL — SIGNIFICANT CHANGE UP (ref 1.6–2.6)
MAGNESIUM SERPL-MCNC: 2.2 MG/DL — SIGNIFICANT CHANGE UP (ref 1.6–2.6)
MCHC RBC-ENTMCNC: 29.2 PG — SIGNIFICANT CHANGE UP (ref 27–34)
MCHC RBC-ENTMCNC: 30 GM/DL — LOW (ref 32–36)
MCV RBC AUTO: 97.2 FL — SIGNIFICANT CHANGE UP (ref 80–100)
METHOD TYPE: SIGNIFICANT CHANGE UP
MONOCYTES # BLD AUTO: 0.48 K/UL — SIGNIFICANT CHANGE UP (ref 0–0.9)
MONOCYTES NFR BLD AUTO: 6.2 % — SIGNIFICANT CHANGE UP (ref 2–14)
NEUTROPHILS # BLD AUTO: 5.27 K/UL — SIGNIFICANT CHANGE UP (ref 1.8–7.4)
NEUTROPHILS NFR BLD AUTO: 68.1 % — SIGNIFICANT CHANGE UP (ref 43–77)
NRBC # BLD: 0 /100 WBCS — SIGNIFICANT CHANGE UP (ref 0–0)
NRBC # FLD: 0 K/UL — SIGNIFICANT CHANGE UP (ref 0–0)
ORGANISM # SPEC MICROSCOPIC CNT: ABNORMAL
ORGANISM # SPEC MICROSCOPIC CNT: ABNORMAL
PHOSPHATE SERPL-MCNC: 2.8 MG/DL — SIGNIFICANT CHANGE UP (ref 2.5–4.5)
PHOSPHATE SERPL-MCNC: 3 MG/DL — SIGNIFICANT CHANGE UP (ref 2.5–4.5)
PLATELET # BLD AUTO: 431 K/UL — HIGH (ref 150–400)
POTASSIUM SERPL-MCNC: 3.9 MMOL/L — SIGNIFICANT CHANGE UP (ref 3.5–5.3)
POTASSIUM SERPL-MCNC: 4.1 MMOL/L — SIGNIFICANT CHANGE UP (ref 3.5–5.3)
POTASSIUM SERPL-SCNC: 3.9 MMOL/L — SIGNIFICANT CHANGE UP (ref 3.5–5.3)
POTASSIUM SERPL-SCNC: 4.1 MMOL/L — SIGNIFICANT CHANGE UP (ref 3.5–5.3)
RBC # BLD: 2.88 M/UL — LOW (ref 4.2–5.8)
RBC # FLD: 16.8 % — HIGH (ref 10.3–14.5)
SODIUM SERPL-SCNC: 145 MMOL/L — SIGNIFICANT CHANGE UP (ref 135–145)
SODIUM SERPL-SCNC: 145 MMOL/L — SIGNIFICANT CHANGE UP (ref 135–145)
SPECIMEN SOURCE: SIGNIFICANT CHANGE UP
SPECIMEN SOURCE: SIGNIFICANT CHANGE UP
WBC # BLD: 7.74 K/UL — SIGNIFICANT CHANGE UP (ref 3.8–10.5)
WBC # FLD AUTO: 7.74 K/UL — SIGNIFICANT CHANGE UP (ref 3.8–10.5)

## 2024-02-28 PROCEDURE — 99233 SBSQ HOSP IP/OBS HIGH 50: CPT | Mod: FS

## 2024-02-28 RX ORDER — QUETIAPINE FUMARATE 200 MG/1
12.5 TABLET, FILM COATED ORAL ONCE
Refills: 0 | Status: COMPLETED | OUTPATIENT
Start: 2024-02-28 | End: 2024-02-28

## 2024-02-28 RX ADMIN — TICAGRELOR 60 MILLIGRAM(S): 90 TABLET ORAL at 05:04

## 2024-02-28 RX ADMIN — Medication 2: at 05:23

## 2024-02-28 RX ADMIN — Medication 3 MILLILITER(S): at 20:20

## 2024-02-28 RX ADMIN — Medication 1 APPLICATION(S): at 21:59

## 2024-02-28 RX ADMIN — Medication 3 MILLILITER(S): at 09:14

## 2024-02-28 RX ADMIN — HEPARIN SODIUM 5000 UNIT(S): 5000 INJECTION INTRAVENOUS; SUBCUTANEOUS at 05:06

## 2024-02-28 RX ADMIN — Medication 1 DROP(S): at 12:11

## 2024-02-28 RX ADMIN — PIPERACILLIN AND TAZOBACTAM 25 GRAM(S): 4; .5 INJECTION, POWDER, LYOPHILIZED, FOR SOLUTION INTRAVENOUS at 05:06

## 2024-02-28 RX ADMIN — INSULIN GLARGINE 12 UNIT(S): 100 INJECTION, SOLUTION SUBCUTANEOUS at 12:10

## 2024-02-28 RX ADMIN — Medication 4: at 00:08

## 2024-02-28 RX ADMIN — QUETIAPINE FUMARATE 12.5 MILLIGRAM(S): 200 TABLET, FILM COATED ORAL at 08:09

## 2024-02-28 RX ADMIN — CARVEDILOL PHOSPHATE 6.25 MILLIGRAM(S): 80 CAPSULE, EXTENDED RELEASE ORAL at 05:05

## 2024-02-28 RX ADMIN — PIPERACILLIN AND TAZOBACTAM 25 GRAM(S): 4; .5 INJECTION, POWDER, LYOPHILIZED, FOR SOLUTION INTRAVENOUS at 21:59

## 2024-02-28 RX ADMIN — TICAGRELOR 60 MILLIGRAM(S): 90 TABLET ORAL at 17:16

## 2024-02-28 RX ADMIN — Medication 1 GRAM(S): at 05:04

## 2024-02-28 RX ADMIN — Medication 2 MILLIGRAM(S): at 21:57

## 2024-02-28 RX ADMIN — Medication 81 MILLIGRAM(S): at 12:11

## 2024-02-28 RX ADMIN — Medication 6 MILLIGRAM(S): at 21:56

## 2024-02-28 RX ADMIN — CARVEDILOL PHOSPHATE 6.25 MILLIGRAM(S): 80 CAPSULE, EXTENDED RELEASE ORAL at 17:16

## 2024-02-28 RX ADMIN — Medication 10 MILLIGRAM(S): at 05:05

## 2024-02-28 RX ADMIN — CHLORHEXIDINE GLUCONATE 1 APPLICATION(S): 213 SOLUTION TOPICAL at 12:11

## 2024-02-28 RX ADMIN — Medication 4: at 17:19

## 2024-02-28 RX ADMIN — CHLORHEXIDINE GLUCONATE 15 MILLILITER(S): 213 SOLUTION TOPICAL at 05:05

## 2024-02-28 RX ADMIN — QUETIAPINE FUMARATE 12.5 MILLIGRAM(S): 200 TABLET, FILM COATED ORAL at 23:14

## 2024-02-28 RX ADMIN — Medication 1 DROP(S): at 05:22

## 2024-02-28 RX ADMIN — LEVETIRACETAM 500 MILLIGRAM(S): 250 TABLET, FILM COATED ORAL at 17:16

## 2024-02-28 RX ADMIN — Medication 2: at 23:21

## 2024-02-28 RX ADMIN — PANTOPRAZOLE SODIUM 40 MILLIGRAM(S): 20 TABLET, DELAYED RELEASE ORAL at 17:17

## 2024-02-28 RX ADMIN — LEVETIRACETAM 500 MILLIGRAM(S): 250 TABLET, FILM COATED ORAL at 05:05

## 2024-02-28 RX ADMIN — ESCITALOPRAM OXALATE 10 MILLIGRAM(S): 10 TABLET, FILM COATED ORAL at 12:10

## 2024-02-28 RX ADMIN — QUETIAPINE FUMARATE 12.5 MILLIGRAM(S): 200 TABLET, FILM COATED ORAL at 03:51

## 2024-02-28 RX ADMIN — Medication 4: at 12:11

## 2024-02-28 RX ADMIN — Medication 1 DROP(S): at 17:18

## 2024-02-28 RX ADMIN — Medication 10 MILLIGRAM(S): at 21:56

## 2024-02-28 RX ADMIN — Medication 10 MILLIGRAM(S): at 13:18

## 2024-02-28 RX ADMIN — HEPARIN SODIUM 5000 UNIT(S): 5000 INJECTION INTRAVENOUS; SUBCUTANEOUS at 13:16

## 2024-02-28 RX ADMIN — CHLORHEXIDINE GLUCONATE 15 MILLILITER(S): 213 SOLUTION TOPICAL at 17:17

## 2024-02-28 RX ADMIN — PANTOPRAZOLE SODIUM 40 MILLIGRAM(S): 20 TABLET, DELAYED RELEASE ORAL at 05:04

## 2024-02-28 RX ADMIN — Medication 1 GRAM(S): at 17:16

## 2024-02-28 RX ADMIN — HEPARIN SODIUM 5000 UNIT(S): 5000 INJECTION INTRAVENOUS; SUBCUTANEOUS at 21:59

## 2024-02-28 RX ADMIN — PIPERACILLIN AND TAZOBACTAM 25 GRAM(S): 4; .5 INJECTION, POWDER, LYOPHILIZED, FOR SOLUTION INTRAVENOUS at 13:17

## 2024-02-28 RX ADMIN — Medication 1 DROP(S): at 23:20

## 2024-02-28 NOTE — CHART NOTE - NSCHARTNOTEFT_GEN_A_CORE
Notified by RN that patient was getting restless, aiming at his tube, Patient seen at bedside with family present, with periods of restlessness previously medicated with Melatonin 6mg, Seroquel 12.5 mg ordered, mittens were not warranted at this time.    ICU Vital Signs Last 24 Hrs  T(C): 36.7 (28 Feb 2024 00:15), Max: 38.2 (27 Feb 2024 16:48)  T(F): 98 (28 Feb 2024 00:15), Max: 100.8 (27 Feb 2024 16:48)  HR: 97 (28 Feb 2024 00:28) (78 - 101)  BP: 148/75 (28 Feb 2024 00:15) (128/67 - 148/75)  BP(mean): --  ABP: --  ABP(mean): --  RR: 14 (28 Feb 2024 00:15) (13 - 18)  SpO2: 99% (28 Feb 2024 00:28) (97% - 100%)    O2 Parameters below as of 28 Feb 2024 00:15  Patient On (Oxygen Delivery Method): ventilator

## 2024-02-28 NOTE — PROGRESS NOTE ADULT - SUBJECTIVE AND OBJECTIVE BOX
CHIEF COMPLAINT: Patient is a 89y old  Male who presents with a chief complaint of COVID19, Chest pain (27 Feb 2024 12:32)      Interval Events:    REVIEW OF SYSTEMS:  [ ] All other systems negative  [ ] Unable to assess ROS because ________    Mode: AC/ CMV (Assist Control/ Continuous Mandatory Ventilation), RR (machine): 12, TV (machine): 450, FiO2: 30, PEEP: 5, ITime: 0.6, MAP: 13, PIP: 46      OBJECTIVE:  ICU Vital Signs Last 24 Hrs  T(C): 36.5 (28 Feb 2024 05:04), Max: 38.2 (27 Feb 2024 16:48)  T(F): 97.7 (28 Feb 2024 05:04), Max: 100.8 (27 Feb 2024 16:48)  HR: 93 (28 Feb 2024 09:17) (78 - 101)  BP: 105/59 (28 Feb 2024 05:04) (105/59 - 148/75)  BP(mean): --  ABP: --  ABP(mean): --  RR: 12 (28 Feb 2024 05:04) (12 - 18)  SpO2: 98% (28 Feb 2024 09:17) (96% - 100%)    O2 Parameters below as of 28 Feb 2024 09:17  Patient On (Oxygen Delivery Method): ventilator          Mode: AC/ CMV (Assist Control/ Continuous Mandatory Ventilation), RR (machine): 12, TV (machine): 450, FiO2: 30, PEEP: 5, ITime: 0.6, MAP: 13, PIP: 46    02-27 @ 07:01  -  02-28 @ 07:00  --------------------------------------------------------  IN: 1340 mL / OUT: 1415 mL / NET: -75 mL      CAPILLARY BLOOD GLUCOSE      POCT Blood Glucose.: 162 mg/dL (28 Feb 2024 05:09)      HOSPITAL MEDICATIONS:  MEDICATIONS  (STANDING):  albuterol/ipratropium for Nebulization 3 milliLiter(s) Nebulizer every 12 hours  artificial  tears Solution 1 Drop(s) Left EYE four times a day  aspirin  chewable 81 milliGRAM(s) Enteral Tube daily  carvedilol 6.25 milliGRAM(s) Oral every 12 hours  chlorhexidine 0.12% Liquid 15 milliLiter(s) Oral Mucosa every 12 hours  chlorhexidine 2% Cloths 1 Application(s) Topical daily  dextrose 5%. 1000 milliLiter(s) (100 mL/Hr) IV Continuous <Continuous>  dextrose 5%. 1000 milliLiter(s) (50 mL/Hr) IV Continuous <Continuous>  dextrose 50% Injectable 25 Gram(s) IV Push once  dextrose 50% Injectable 25 Gram(s) IV Push once  dextrose 50% Injectable 12.5 Gram(s) IV Push once  doxazosin 2 milliGRAM(s) Oral at bedtime  escitalopram Solution 10 milliGRAM(s) Oral daily  glucagon  Injectable 1 milliGRAM(s) IntraMuscular once  heparin   Injectable 5000 Unit(s) SubCutaneous every 8 hours  hydrALAZINE 10 milliGRAM(s) Oral every 8 hours  insulin glargine Injectable (LANTUS) 12 Unit(s) SubCutaneous <User Schedule>  insulin lispro (ADMELOG) corrective regimen sliding scale   SubCutaneous every 6 hours  levETIRAcetam  Solution 500 milliGRAM(s) Oral two times a day  melatonin 6 milliGRAM(s) Oral at bedtime  pantoprazole   Suspension 40 milliGRAM(s) Oral every 12 hours  petrolatum Ophthalmic Ointment 1 Application(s) Left EYE at bedtime  piperacillin/tazobactam IVPB.. 3.375 Gram(s) IV Intermittent every 8 hours  sucralfate suspension 1 Gram(s) Enteral Tube two times a day  ticagrelor 60 milliGRAM(s) Oral every 12 hours    MEDICATIONS  (PRN):  acetaminophen   Oral Liquid .. 650 milliGRAM(s) Oral every 6 hours PRN Temp greater or equal to 38C (100.4F), Mild Pain (1 - 3), Moderate Pain (4 - 6)  dextrose Oral Gel 15 Gram(s) Oral once PRN Blood Glucose LESS THAN 70 milliGRAM(s)/deciliter      LABS:                        8.4    7.74  )-----------( 431      ( 28 Feb 2024 06:00 )             28.0     02-28    145  |  105  |  36<H>  ----------------------------<  158<H>  3.9   |  30  |  1.30    Ca    8.4      28 Feb 2024 06:00  Phos  2.8     02-28  Mg     2.20     02-28        Urinalysis Basic - ( 28 Feb 2024 06:00 )    Color: x / Appearance: x / SG: x / pH: x  Gluc: 158 mg/dL / Ketone: x  / Bili: x / Urobili: x   Blood: x / Protein: x / Nitrite: x   Leuk Esterase: x / RBC: x / WBC x   Sq Epi: x / Non Sq Epi: x / Bacteria: x            PAST MEDICAL & SURGICAL HISTORY:  Hyperlipemia      Hypertension      Coronary Artery Disease      Diabetes Mellitus Type II      Stented Coronary Artery  total 5 stents, last stent 5/2019      Neuropathy      Myocardial infarction      Stroke  mild left facial numbness   no other residuals verbalized      Myoclonic jerking      Stage 3 chronic kidney disease      History of Cataract Extraction      Hx of CABG      H/O coronary angiogram      S/P coronary artery stent placement  1/6/09      S/P placement of cardiac pacemaker          FAMILY HISTORY:  No pertinent family history in first degree relatives        Social History:  Lives with family  Ambulates with walker  Denies tobacco, EtOH, or illicit substance use (23 Dec 2023 22:51)      RADIOLOGY:  [ ] Reviewed and interpreted by me    PULMONARY FUNCTION TESTS:    EKG: CHIEF COMPLAINT: Patient is a 89y old  Male who presents with a chief complaint of COVID19, Chest pain (27 Feb 2024 12:32)    Interval Events: None reported overnight. Clinically unchanged. VSS and medications reviewed     REVIEW OF SYSTEMS:  [x] Unable to assess ROS because poor mental status     Mode: AC/ CMV (Assist Control/ Continuous Mandatory Ventilation), RR (machine): 12, TV (machine): 450, FiO2: 30, PEEP: 5, ITime: 0.6, MAP: 13, PIP: 46    OBJECTIVE:  ICU Vital Signs Last 24 Hrs  T(C): 36.5 (28 Feb 2024 05:04), Max: 38.2 (27 Feb 2024 16:48)  T(F): 97.7 (28 Feb 2024 05:04), Max: 100.8 (27 Feb 2024 16:48)  HR: 93 (28 Feb 2024 09:17) (78 - 101)  BP: 105/59 (28 Feb 2024 05:04) (105/59 - 148/75)  BP(mean): --  ABP: --  ABP(mean): --  RR: 12 (28 Feb 2024 05:04) (12 - 18)  SpO2: 98% (28 Feb 2024 09:17) (96% - 100%)    O2 Parameters below as of 28 Feb 2024 09:17  Patient On (Oxygen Delivery Method): ventilator      Mode: AC/ CMV (Assist Control/ Continuous Mandatory Ventilation), RR (machine): 12, TV (machine): 450, FiO2: 30, PEEP: 5, ITime: 0.6, MAP: 13, PIP: 46    02-27 @ 07:01  -  02-28 @ 07:00  --------------------------------------------------------  IN: 1340 mL / OUT: 1415 mL / NET: -75 mL      CAPILLARY BLOOD GLUCOSE      POCT Blood Glucose.: 162 mg/dL (28 Feb 2024 05:09)      HOSPITAL MEDICATIONS:  MEDICATIONS  (STANDING):  albuterol/ipratropium for Nebulization 3 milliLiter(s) Nebulizer every 12 hours  artificial  tears Solution 1 Drop(s) Left EYE four times a day  aspirin  chewable 81 milliGRAM(s) Enteral Tube daily  carvedilol 6.25 milliGRAM(s) Oral every 12 hours  chlorhexidine 0.12% Liquid 15 milliLiter(s) Oral Mucosa every 12 hours  chlorhexidine 2% Cloths 1 Application(s) Topical daily  dextrose 5%. 1000 milliLiter(s) (100 mL/Hr) IV Continuous <Continuous>  dextrose 5%. 1000 milliLiter(s) (50 mL/Hr) IV Continuous <Continuous>  dextrose 50% Injectable 25 Gram(s) IV Push once  dextrose 50% Injectable 25 Gram(s) IV Push once  dextrose 50% Injectable 12.5 Gram(s) IV Push once  doxazosin 2 milliGRAM(s) Oral at bedtime  escitalopram Solution 10 milliGRAM(s) Oral daily  glucagon  Injectable 1 milliGRAM(s) IntraMuscular once  heparin   Injectable 5000 Unit(s) SubCutaneous every 8 hours  hydrALAZINE 10 milliGRAM(s) Oral every 8 hours  insulin glargine Injectable (LANTUS) 12 Unit(s) SubCutaneous <User Schedule>  insulin lispro (ADMELOG) corrective regimen sliding scale   SubCutaneous every 6 hours  levETIRAcetam  Solution 500 milliGRAM(s) Oral two times a day  melatonin 6 milliGRAM(s) Oral at bedtime  pantoprazole   Suspension 40 milliGRAM(s) Oral every 12 hours  petrolatum Ophthalmic Ointment 1 Application(s) Left EYE at bedtime  piperacillin/tazobactam IVPB.. 3.375 Gram(s) IV Intermittent every 8 hours  sucralfate suspension 1 Gram(s) Enteral Tube two times a day  ticagrelor 60 milliGRAM(s) Oral every 12 hours    MEDICATIONS  (PRN):  acetaminophen   Oral Liquid .. 650 milliGRAM(s) Oral every 6 hours PRN Temp greater or equal to 38C (100.4F), Mild Pain (1 - 3), Moderate Pain (4 - 6)  dextrose Oral Gel 15 Gram(s) Oral once PRN Blood Glucose LESS THAN 70 milliGRAM(s)/deciliter    PHYSICAL EXAM:  General: Laying in bed comfortably, NAD   HEENT:(+)NGT in R-nare  Neck: (+) Trach tube noted, site c/d/i.  HEART: +s1, s2  LUNGS: +coarse breath sounds. No resp distress.   GI: +BS, soft, nt/nd, no peritoneal signs noted   Extremities: 1-2+ b/l pitting LE edema extending to below knee region.   NEURO: awake, eyes open. PERRL. Nonverbal. Not following commands. Unresponsive.   Skin: as per RN assessment sheet     LABS:                        8.4    7.74  )-----------( 431      ( 28 Feb 2024 06:00 )             28.0     02-28    145  |  105  |  36<H>  ----------------------------<  158<H>  3.9   |  30  |  1.30    Ca    8.4      28 Feb 2024 06:00  Phos  2.8     02-28  Mg     2.20     02-28        Urinalysis Basic - ( 28 Feb 2024 06:00 )    Color: x / Appearance: x / SG: x / pH: x  Gluc: 158 mg/dL / Ketone: x  / Bili: x / Urobili: x   Blood: x / Protein: x / Nitrite: x   Leuk Esterase: x / RBC: x / WBC x   Sq Epi: x / Non Sq Epi: x / Bacteria: x            PAST MEDICAL & SURGICAL HISTORY:  Hyperlipemia      Hypertension      Coronary Artery Disease      Diabetes Mellitus Type II      Stented Coronary Artery  total 5 stents, last stent 5/2019      Neuropathy      Myocardial infarction      Stroke  mild left facial numbness   no other residuals verbalized      Myoclonic jerking      Stage 3 chronic kidney disease      History of Cataract Extraction      Hx of CABG      H/O coronary angiogram      S/P coronary artery stent placement  1/6/09      S/P placement of cardiac pacemaker          FAMILY HISTORY:  No pertinent family history in first degree relatives        Social History:  Lives with family  Ambulates with walker  Denies tobacco, EtOH, or illicit substance use (23 Dec 2023 22:51)      RADIOLOGY:  [ ] Reviewed and interpreted by me    PULMONARY FUNCTION TESTS:    EKG:

## 2024-02-28 NOTE — PROGRESS NOTE ADULT - SUBJECTIVE AND OBJECTIVE BOX
NEPHROLOGY-[facility] [phone]        Patient seen and examined trach to vent.         MEDICATIONS  (STANDING):  albuterol/ipratropium for Nebulization 3 milliLiter(s) Nebulizer every 12 hours  artificial  tears Solution 1 Drop(s) Left EYE four times a day  aspirin  chewable 81 milliGRAM(s) Enteral Tube daily  carvedilol 6.25 milliGRAM(s) Oral every 12 hours  chlorhexidine 0.12% Liquid 15 milliLiter(s) Oral Mucosa every 12 hours  chlorhexidine 2% Cloths 1 Application(s) Topical daily  dextrose 5%. 1000 milliLiter(s) (100 mL/Hr) IV Continuous <Continuous>  dextrose 5%. 1000 milliLiter(s) (50 mL/Hr) IV Continuous <Continuous>  dextrose 50% Injectable 25 Gram(s) IV Push once  dextrose 50% Injectable 12.5 Gram(s) IV Push once  dextrose 50% Injectable 25 Gram(s) IV Push once  doxazosin 2 milliGRAM(s) Oral at bedtime  escitalopram Solution 10 milliGRAM(s) Oral daily  glucagon  Injectable 1 milliGRAM(s) IntraMuscular once  heparin   Injectable 5000 Unit(s) SubCutaneous every 8 hours  hydrALAZINE 10 milliGRAM(s) Oral every 8 hours  insulin glargine Injectable (LANTUS) 12 Unit(s) SubCutaneous <User Schedule>  insulin lispro (ADMELOG) corrective regimen sliding scale   SubCutaneous every 6 hours  levETIRAcetam  Solution 500 milliGRAM(s) Oral two times a day  melatonin 6 milliGRAM(s) Oral at bedtime  pantoprazole   Suspension 40 milliGRAM(s) Oral every 12 hours  petrolatum Ophthalmic Ointment 1 Application(s) Left EYE at bedtime  sucralfate suspension 1 Gram(s) Enteral Tube two times a day  ticagrelor 60 milliGRAM(s) Oral every 12 hours      VITAL:  T(C): , Max: 36.9 (02-26-24 @ 12:00)  T(F): , Max: 98.4 (02-26-24 @ 12:00)  HR: 93 (02-26-24 @ 12:00)  BP: 137/88 (02-26-24 @ 12:00)  BP(mean): --  RR: 19 (02-26-24 @ 12:00)  SpO2: 99% (02-26-24 @ 12:00)  Wt(kg): --    I and O's:    02-25 @ 07:01  -  02-26 @ 07:00  --------------------------------------------------------  IN: 2140 mL / OUT: 2150 mL / NET: -10 mL        PHYSICAL EXAM:  Constitutional: NAD trach to vent   HEENT: +trach  Respiratory: coarse bs   Cardiovascular: normal s1s2  Gastrointestinal: BS+, soft, NT/ND +PEG  Extremities:+ peripheral edema  :  + external catheter   Skin: No rashes      LABS:                        8.7    8.17  )-----------( 402      ( 26 Feb 2024 05:40 )             29.0     02-26    147<H>  |  107  |  34<H>  ----------------------------<  175<H>  3.5   |  30  |  1.40<H>    Ca    8.8      26 Feb 2024 05:40  Phos  2.7     02-26  Mg     2.00     02-26            Urine Studies:  Urinalysis Basic - ( 26 Feb 2024 05:40 )    Color: x / Appearance: x / SG: x / pH: x  Gluc: 175 mg/dL / Ketone: x  / Bili: x / Urobili: x   Blood: x / Protein: x / Nitrite: x   Leuk Esterase: x / RBC: x / WBC x   Sq Epi: x / Non Sq Epi: x / Bacteria: x      IMPRESSION:  89M w/ DM2, HTN, CVA, PAD, CKD3, and CAD-CABG, 12/23/23 p/w COVID19 infection, c/b respiratory failure/hypotension; now s/p tracheostomy    (1)Renal - CKD3; superimposed mild prerenal azotemia - numbers fluctuating based on hemodynamic status  (2)Hypokalemia - improved s/p repletion   (3)CV - now off pressors and midodrine, BP improved/stable    (4)Pulm - vent-dependent   (5ID - afebrile, s/p zosyn   (6)GI - s/p PEG (2/20)  (7)Hypernatremia - possibly due to diuresis, on free water    RECOMMEND:   (1)cont lasix prn for hypervolemia   (2)decrease  Free water 200 cc q4h from tomorrow   (3)Continue meds for GFR 30-40ml/min      [facility]  [facility]  Office: [phone]

## 2024-02-28 NOTE — PROGRESS NOTE ADULT - SUBJECTIVE AND OBJECTIVE BOX
Aashish Boyer MD  Interventional Cardiology / Endovascular Specialist  [place] Office : [address] [place] [address] Tel:   [place] Office : [address] [facility] [address]  Tel: [phone]      Subjective/Overnight events: Patient lying in bed in RCU	  MEDICATIONS:  aspirin  chewable 81 milliGRAM(s) Enteral Tube daily  carvedilol 6.25 milliGRAM(s) Oral every 12 hours  doxazosin 2 milliGRAM(s) Oral at bedtime  heparin   Injectable 5000 Unit(s) SubCutaneous every 8 hours  hydrALAZINE 10 milliGRAM(s) Oral every 8 hours  ticagrelor 60 milliGRAM(s) Oral every 12 hours    piperacillin/tazobactam IVPB.. 3.375 Gram(s) IV Intermittent every 8 hours    albuterol/ipratropium for Nebulization 3 milliLiter(s) Nebulizer every 12 hours    acetaminophen   Oral Liquid .. 650 milliGRAM(s) Oral every 6 hours PRN  escitalopram Solution 10 milliGRAM(s) Oral daily  levETIRAcetam  Solution 500 milliGRAM(s) Oral two times a day  melatonin 6 milliGRAM(s) Oral at bedtime    pantoprazole   Suspension 40 milliGRAM(s) Oral every 12 hours  sucralfate suspension 1 Gram(s) Enteral Tube two times a day    dextrose 50% Injectable 25 Gram(s) IV Push once  dextrose 50% Injectable 12.5 Gram(s) IV Push once  dextrose 50% Injectable 25 Gram(s) IV Push once  dextrose Oral Gel 15 Gram(s) Oral once PRN  glucagon  Injectable 1 milliGRAM(s) IntraMuscular once  insulin glargine Injectable (LANTUS) 12 Unit(s) SubCutaneous <User Schedule>  insulin lispro (ADMELOG) corrective regimen sliding scale   SubCutaneous every 6 hours    artificial  tears Solution 1 Drop(s) Left EYE four times a day  chlorhexidine 0.12% Liquid 15 milliLiter(s) Oral Mucosa every 12 hours  chlorhexidine 2% Cloths 1 Application(s) Topical daily  dextrose 5%. 1000 milliLiter(s) IV Continuous <Continuous>  dextrose 5%. 1000 milliLiter(s) IV Continuous <Continuous>  petrolatum Ophthalmic Ointment 1 Application(s) Left EYE at bedtime      PAST MEDICAL/SURGICAL HISTORY  PAST MEDICAL & SURGICAL HISTORY:  Hyperlipemia      Hypertension      Coronary Artery Disease      Diabetes Mellitus Type II      Stented Coronary Artery  total 5 stents, last stent 5/2019      Neuropathy      Myocardial infarction      Stroke  mild left facial numbness   no other residuals verbalized      Myoclonic jerking      Stage 3 chronic kidney disease      History of Cataract Extraction      Hx of CABG      H/O coronary angiogram      S/P coronary artery stent placement  1/6/09      S/P placement of cardiac pacemaker          SOCIAL HISTORY: Substance Use (street drugs): ( x ) never used  (  ) other:    FAMILY HISTORY:  No pertinent family history in first degree relatives        PHYSICAL EXAM:  T(C): 36.7 (02-28-24 @ 08:00), Max: 38.2 (02-27-24 @ 16:48)  HR: 92 (02-28-24 @ 11:58) (78 - 101)  BP: 106/57 (02-28-24 @ 08:00) (105/59 - 148/75)  RR: 18 (02-28-24 @ 08:00) (12 - 18)  SpO2: 96% (02-28-24 @ 11:58) (96% - 100%)  Wt(kg): --  I&O's Summary    27 Feb 2024 07:01  -  28 Feb 2024 07:00  --------------------------------------------------------  IN: 1340 mL / OUT: 1415 mL / NET: -75 mL        GENERAL: NAD  EYES:  conjunctiva and sclera clear  ENMT: s/p trach  Cardiovascular: Normal S1 S2, No JVD, No murmurs, No edema  Respiratory: Lungs clear to auscultation	  Gastrointestinal:  Soft, s/p PEG  Extremities: No edema                                     8.4    7.74  )-----------( 431      ( 28 Feb 2024 06:00 )             28.0     02-28    145  |  105  |  36<H>  ----------------------------<  158<H>  3.9   |  30  |  1.30    Ca    8.4      28 Feb 2024 06:00  Phos  2.8     02-28  Mg     2.20     02-28      proBNP:   Lipid Profile:   HgA1c:   TSH:     Consultant(s) Notes Reviewed:  [x ] YES  [ ] NO    Care Discussed with Consultants/Other Providers [ x] YES  [ ] NO    Imaging Personally Reviewed independently:  [x] YES  [ ] NO    All labs, radiologic studies, vitals, orders and medications list reviewed. Patient is seen and examined at bedside. Case discussed with medical team.

## 2024-02-29 LAB
ADD ON TEST-SPECIMEN IN LAB: SIGNIFICANT CHANGE UP
ALBUMIN SERPL ELPH-MCNC: 2.6 G/DL — LOW (ref 3.3–5)
ALP SERPL-CCNC: 138 U/L — HIGH (ref 40–120)
ALT FLD-CCNC: 22 U/L — SIGNIFICANT CHANGE UP (ref 4–41)
ANION GAP SERPL CALC-SCNC: 9 MMOL/L — SIGNIFICANT CHANGE UP (ref 7–14)
AST SERPL-CCNC: 38 U/L — SIGNIFICANT CHANGE UP (ref 4–40)
BASOPHILS # BLD AUTO: 0.06 K/UL — SIGNIFICANT CHANGE UP (ref 0–0.2)
BASOPHILS NFR BLD AUTO: 0.8 % — SIGNIFICANT CHANGE UP (ref 0–2)
BILIRUB DIRECT SERPL-MCNC: <0.2 MG/DL — SIGNIFICANT CHANGE UP (ref 0–0.3)
BILIRUB INDIRECT FLD-MCNC: >0.2 MG/DL — SIGNIFICANT CHANGE UP (ref 0–1)
BILIRUB SERPL-MCNC: 0.4 MG/DL — SIGNIFICANT CHANGE UP (ref 0.2–1.2)
BUN SERPL-MCNC: 37 MG/DL — HIGH (ref 7–23)
CALCIUM SERPL-MCNC: 8.3 MG/DL — LOW (ref 8.4–10.5)
CHLORIDE SERPL-SCNC: 102 MMOL/L — SIGNIFICANT CHANGE UP (ref 98–107)
CO2 SERPL-SCNC: 30 MMOL/L — SIGNIFICANT CHANGE UP (ref 22–31)
CREAT SERPL-MCNC: 1.36 MG/DL — HIGH (ref 0.5–1.3)
EGFR: 49 ML/MIN/1.73M2 — LOW
EOSINOPHIL # BLD AUTO: 0.99 K/UL — HIGH (ref 0–0.5)
EOSINOPHIL NFR BLD AUTO: 13.4 % — HIGH (ref 0–6)
GLUCOSE BLDC GLUCOMTR-MCNC: 143 MG/DL — HIGH (ref 70–99)
GLUCOSE BLDC GLUCOMTR-MCNC: 152 MG/DL — HIGH (ref 70–99)
GLUCOSE BLDC GLUCOMTR-MCNC: 181 MG/DL — HIGH (ref 70–99)
GLUCOSE BLDC GLUCOMTR-MCNC: 213 MG/DL — HIGH (ref 70–99)
GLUCOSE SERPL-MCNC: 156 MG/DL — HIGH (ref 70–99)
HCT VFR BLD CALC: 27.7 % — LOW (ref 39–50)
HGB BLD-MCNC: 8.6 G/DL — LOW (ref 13–17)
IANC: 4.13 K/UL — SIGNIFICANT CHANGE UP (ref 1.8–7.4)
IMM GRANULOCYTES NFR BLD AUTO: 0.4 % — SIGNIFICANT CHANGE UP (ref 0–0.9)
LYMPHOCYTES # BLD AUTO: 1.65 K/UL — SIGNIFICANT CHANGE UP (ref 1–3.3)
LYMPHOCYTES # BLD AUTO: 22.3 % — SIGNIFICANT CHANGE UP (ref 13–44)
MAGNESIUM SERPL-MCNC: 2.2 MG/DL — SIGNIFICANT CHANGE UP (ref 1.6–2.6)
MCHC RBC-ENTMCNC: 29.4 PG — SIGNIFICANT CHANGE UP (ref 27–34)
MCHC RBC-ENTMCNC: 31 GM/DL — LOW (ref 32–36)
MCV RBC AUTO: 94.5 FL — SIGNIFICANT CHANGE UP (ref 80–100)
MONOCYTES # BLD AUTO: 0.55 K/UL — SIGNIFICANT CHANGE UP (ref 0–0.9)
MONOCYTES NFR BLD AUTO: 7.4 % — SIGNIFICANT CHANGE UP (ref 2–14)
NEUTROPHILS # BLD AUTO: 4.13 K/UL — SIGNIFICANT CHANGE UP (ref 1.8–7.4)
NEUTROPHILS NFR BLD AUTO: 55.7 % — SIGNIFICANT CHANGE UP (ref 43–77)
NRBC # BLD: 0 /100 WBCS — SIGNIFICANT CHANGE UP (ref 0–0)
NRBC # FLD: 0 K/UL — SIGNIFICANT CHANGE UP (ref 0–0)
PHOSPHATE SERPL-MCNC: 3.2 MG/DL — SIGNIFICANT CHANGE UP (ref 2.5–4.5)
PLATELET # BLD AUTO: 395 K/UL — SIGNIFICANT CHANGE UP (ref 150–400)
POTASSIUM SERPL-MCNC: 4.2 MMOL/L — SIGNIFICANT CHANGE UP (ref 3.5–5.3)
POTASSIUM SERPL-SCNC: 4.2 MMOL/L — SIGNIFICANT CHANGE UP (ref 3.5–5.3)
PROT SERPL-MCNC: 6.5 G/DL — SIGNIFICANT CHANGE UP (ref 6–8.3)
RBC # BLD: 2.93 M/UL — LOW (ref 4.2–5.8)
RBC # FLD: 16.4 % — HIGH (ref 10.3–14.5)
SODIUM SERPL-SCNC: 141 MMOL/L — SIGNIFICANT CHANGE UP (ref 135–145)
WBC # BLD: 7.23 K/UL — SIGNIFICANT CHANGE UP (ref 3.8–10.5)
WBC # FLD AUTO: 7.23 K/UL — SIGNIFICANT CHANGE UP (ref 3.8–10.5)

## 2024-02-29 PROCEDURE — 99233 SBSQ HOSP IP/OBS HIGH 50: CPT | Mod: FS

## 2024-02-29 RX ORDER — FUROSEMIDE 40 MG
40 TABLET ORAL ONCE
Refills: 0 | Status: COMPLETED | OUTPATIENT
Start: 2024-02-29 | End: 2024-02-29

## 2024-02-29 RX ORDER — ISOSORBIDE DINITRATE 5 MG/1
5 TABLET ORAL THREE TIMES A DAY
Refills: 0 | Status: DISCONTINUED | OUTPATIENT
Start: 2024-02-29 | End: 2024-03-02

## 2024-02-29 RX ORDER — QUETIAPINE FUMARATE 200 MG/1
12.5 TABLET, FILM COATED ORAL
Refills: 0 | Status: DISCONTINUED | OUTPATIENT
Start: 2024-02-29 | End: 2024-03-02

## 2024-02-29 RX ORDER — ERTAPENEM SODIUM 1 G/1
1000 INJECTION, POWDER, LYOPHILIZED, FOR SOLUTION INTRAMUSCULAR; INTRAVENOUS EVERY 24 HOURS
Refills: 0 | Status: COMPLETED | OUTPATIENT
Start: 2024-02-29 | End: 2024-03-06

## 2024-02-29 RX ADMIN — LEVETIRACETAM 500 MILLIGRAM(S): 250 TABLET, FILM COATED ORAL at 17:23

## 2024-02-29 RX ADMIN — Medication 3 MILLILITER(S): at 22:36

## 2024-02-29 RX ADMIN — LEVETIRACETAM 500 MILLIGRAM(S): 250 TABLET, FILM COATED ORAL at 05:39

## 2024-02-29 RX ADMIN — CHLORHEXIDINE GLUCONATE 1 APPLICATION(S): 213 SOLUTION TOPICAL at 11:14

## 2024-02-29 RX ADMIN — PANTOPRAZOLE SODIUM 40 MILLIGRAM(S): 20 TABLET, DELAYED RELEASE ORAL at 05:39

## 2024-02-29 RX ADMIN — CHLORHEXIDINE GLUCONATE 15 MILLILITER(S): 213 SOLUTION TOPICAL at 05:40

## 2024-02-29 RX ADMIN — PIPERACILLIN AND TAZOBACTAM 25 GRAM(S): 4; .5 INJECTION, POWDER, LYOPHILIZED, FOR SOLUTION INTRAVENOUS at 06:01

## 2024-02-29 RX ADMIN — Medication 2: at 17:23

## 2024-02-29 RX ADMIN — Medication 3 MILLILITER(S): at 09:36

## 2024-02-29 RX ADMIN — Medication 2 MILLIGRAM(S): at 21:02

## 2024-02-29 RX ADMIN — Medication 1 GRAM(S): at 05:38

## 2024-02-29 RX ADMIN — TICAGRELOR 60 MILLIGRAM(S): 90 TABLET ORAL at 05:39

## 2024-02-29 RX ADMIN — QUETIAPINE FUMARATE 12.5 MILLIGRAM(S): 200 TABLET, FILM COATED ORAL at 21:01

## 2024-02-29 RX ADMIN — CARVEDILOL PHOSPHATE 6.25 MILLIGRAM(S): 80 CAPSULE, EXTENDED RELEASE ORAL at 05:39

## 2024-02-29 RX ADMIN — CARVEDILOL PHOSPHATE 6.25 MILLIGRAM(S): 80 CAPSULE, EXTENDED RELEASE ORAL at 17:10

## 2024-02-29 RX ADMIN — Medication 6 MILLIGRAM(S): at 21:02

## 2024-02-29 RX ADMIN — Medication 10 MILLIGRAM(S): at 05:40

## 2024-02-29 RX ADMIN — Medication 10 MILLIGRAM(S): at 13:04

## 2024-02-29 RX ADMIN — Medication 40 MILLIGRAM(S): at 11:13

## 2024-02-29 RX ADMIN — Medication 1 GRAM(S): at 17:23

## 2024-02-29 RX ADMIN — ESCITALOPRAM OXALATE 10 MILLIGRAM(S): 10 TABLET, FILM COATED ORAL at 11:14

## 2024-02-29 RX ADMIN — TICAGRELOR 60 MILLIGRAM(S): 90 TABLET ORAL at 17:10

## 2024-02-29 RX ADMIN — ERTAPENEM SODIUM 120 MILLIGRAM(S): 1 INJECTION, POWDER, LYOPHILIZED, FOR SOLUTION INTRAMUSCULAR; INTRAVENOUS at 13:04

## 2024-02-29 RX ADMIN — HEPARIN SODIUM 5000 UNIT(S): 5000 INJECTION INTRAVENOUS; SUBCUTANEOUS at 13:04

## 2024-02-29 RX ADMIN — Medication 1 DROP(S): at 17:17

## 2024-02-29 RX ADMIN — PANTOPRAZOLE SODIUM 40 MILLIGRAM(S): 20 TABLET, DELAYED RELEASE ORAL at 17:26

## 2024-02-29 RX ADMIN — Medication 10 MILLIGRAM(S): at 21:01

## 2024-02-29 RX ADMIN — ISOSORBIDE DINITRATE 5 MILLIGRAM(S): 5 TABLET ORAL at 17:10

## 2024-02-29 RX ADMIN — INSULIN GLARGINE 12 UNIT(S): 100 INJECTION, SOLUTION SUBCUTANEOUS at 11:26

## 2024-02-29 RX ADMIN — Medication 1 DROP(S): at 11:13

## 2024-02-29 RX ADMIN — Medication 1 APPLICATION(S): at 21:02

## 2024-02-29 RX ADMIN — ISOSORBIDE DINITRATE 5 MILLIGRAM(S): 5 TABLET ORAL at 12:03

## 2024-02-29 RX ADMIN — HEPARIN SODIUM 5000 UNIT(S): 5000 INJECTION INTRAVENOUS; SUBCUTANEOUS at 05:38

## 2024-02-29 RX ADMIN — CHLORHEXIDINE GLUCONATE 15 MILLILITER(S): 213 SOLUTION TOPICAL at 17:24

## 2024-02-29 RX ADMIN — Medication 4: at 11:23

## 2024-02-29 RX ADMIN — HEPARIN SODIUM 5000 UNIT(S): 5000 INJECTION INTRAVENOUS; SUBCUTANEOUS at 21:01

## 2024-02-29 RX ADMIN — Medication 2: at 06:01

## 2024-02-29 RX ADMIN — Medication 81 MILLIGRAM(S): at 11:14

## 2024-02-29 NOTE — PROCEDURE NOTE - NSPROCDETAILS_GEN_ALL_CORE
guidewire recovered/lumen(s) aspirated and flushed/sterile dressing applied/sterile technique, catheter placed/ultrasound guidance with use of sterile gel and probe cove
patient pre-oxygenated, tube inserted, placement confirmed
blood seen on insertion/dressing applied/flushes easily/secured in place
patient pre-oxygenated, tube inserted, placement confirmed
sterile dressing applied/sterile technique, catheter placed/ultrasound guidance

## 2024-02-29 NOTE — PROGRESS NOTE ADULT - SUBJECTIVE AND OBJECTIVE BOX
Aashish Boyer MD  Interventional Cardiology / Endovascular Specialist  [place] Office : [address] [place] [address] Tel:   [place] Office : [address] [facility] [address] [address]  Tel: [phone]      Subjective/Overnight events: Patient lying in bed in RCU no acute distress    	  MEDICATIONS:  aspirin  chewable 81 milliGRAM(s) Enteral Tube daily  carvedilol 6.25 milliGRAM(s) Oral every 12 hours  doxazosin 2 milliGRAM(s) Oral at bedtime  heparin   Injectable 5000 Unit(s) SubCutaneous every 8 hours  hydrALAZINE 10 milliGRAM(s) Oral every 8 hours  isosorbide   dinitrate Tablet (ISORDIL) 5 milliGRAM(s) Oral three times a day  ticagrelor 60 milliGRAM(s) Oral every 12 hours    ertapenem  IVPB 1000 milliGRAM(s) IV Intermittent every 24 hours    albuterol/ipratropium for Nebulization 3 milliLiter(s) Nebulizer every 12 hours    acetaminophen   Oral Liquid .. 650 milliGRAM(s) Oral every 6 hours PRN  escitalopram Solution 10 milliGRAM(s) Oral daily  levETIRAcetam  Solution 500 milliGRAM(s) Oral two times a day  melatonin 6 milliGRAM(s) Oral at bedtime  QUEtiapine 12.5 milliGRAM(s) Oral <User Schedule>    pantoprazole   Suspension 40 milliGRAM(s) Oral every 12 hours  sucralfate suspension 1 Gram(s) Enteral Tube two times a day    dextrose 50% Injectable 25 Gram(s) IV Push once  dextrose 50% Injectable 12.5 Gram(s) IV Push once  dextrose 50% Injectable 25 Gram(s) IV Push once  dextrose Oral Gel 15 Gram(s) Oral once PRN  glucagon  Injectable 1 milliGRAM(s) IntraMuscular once  insulin glargine Injectable (LANTUS) 12 Unit(s) SubCutaneous <User Schedule>  insulin lispro (ADMELOG) corrective regimen sliding scale   SubCutaneous every 6 hours    artificial  tears Solution 1 Drop(s) Left EYE four times a day  chlorhexidine 0.12% Liquid 15 milliLiter(s) Oral Mucosa every 12 hours  chlorhexidine 2% Cloths 1 Application(s) Topical daily  dextrose 5%. 1000 milliLiter(s) IV Continuous <Continuous>  dextrose 5%. 1000 milliLiter(s) IV Continuous <Continuous>  petrolatum Ophthalmic Ointment 1 Application(s) Left EYE at bedtime      PAST MEDICAL/SURGICAL HISTORY  PAST MEDICAL & SURGICAL HISTORY:  Hyperlipemia      Hypertension      Coronary Artery Disease      Diabetes Mellitus Type II      Stented Coronary Artery  total 5 stents, last stent 5/2019      Neuropathy      Myocardial infarction      Stroke  mild left facial numbness   no other residuals verbalized      Myoclonic jerking      Stage 3 chronic kidney disease      History of Cataract Extraction      Hx of CABG      H/O coronary angiogram      S/P coronary artery stent placement  1/6/09      S/P placement of cardiac pacemaker          SOCIAL HISTORY: Substance Use (street drugs): ( x ) never used  (  ) other:    FAMILY HISTORY:  No pertinent family history in first degree relatives          PHYSICAL EXAM:  T(C): 37.1 (02-29-24 @ 08:00), Max: 37.1 (02-29-24 @ 05:40)  HR: 87 (02-29-24 @ 12:04) (80 - 99)  BP: 100/55 (02-29-24 @ 08:00) (100/55 - 134/85)  RR: 18 (02-29-24 @ 08:00) (18 - 19)  SpO2: 96% (02-29-24 @ 12:04) (96% - 100%)  Wt(kg): --  I&O's Summary    28 Feb 2024 07:01  -  29 Feb 2024 07:00  --------------------------------------------------------  IN: 2300 mL / OUT: 1265 mL / NET: 1035 mL          GENERAL: NAD  EYES:  conjunctiva and sclera clear  ENMT: s/p trach  Cardiovascular: Normal S1 S2, No JVD, No murmurs, No edema  Respiratory: Lungs clear to auscultation	  Gastrointestinal:  Soft, s/p PEG  Extremities: No edema                                         8.6    7.23  )-----------( 395      ( 29 Feb 2024 05:45 )             27.7     02-29    141  |  102  |  37<H>  ----------------------------<  156<H>  4.2   |  30  |  1.36<H>    Ca    8.3<L>      29 Feb 2024 05:45  Phos  3.2     02-29  Mg     2.20     02-29    TPro  6.5  /  Alb  2.6<L>  /  TBili  0.4  /  DBili  <0.2  /  AST  38  /  ALT  22  /  AlkPhos  138<H>  02-29    proBNP:   Lipid Profile:   HgA1c:   TSH:     Consultant(s) Notes Reviewed:  [x ] YES  [ ] NO    Care Discussed with Consultants/Other Providers [ x] YES  [ ] NO    Imaging Personally Reviewed independently:  [x] YES  [ ] NO    All labs, radiologic studies, vitals, orders and medications list reviewed. Patient is seen and examined at bedside. Case discussed with medical team.

## 2024-02-29 NOTE — PROGRESS NOTE ADULT - ASSESSMENT
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     EKG SR RBBB (old per family)     1) Hypoxia   - s/p bronch   - Rt pleural effusion   - held lasix given Hypernatremia and worsening creat   - f/u neuro recs MRI brain shows no acute disease  - s/p trach and PEG       2) CAD s/p CABG  - p/w chest pain. EKG non ischemic , trop -sera   - chest pains  Trop mildly elevated and trending down likely sec to kidney disease,   - holding brilinta, was on it for SMA stent placed in 12/2023, held 2/2 anticipation for PEG placement, restart when no further invasive procedures planned  -TTE 2/12 with  mod decrease LV systolic function, new. euvolemic on exam,on lasix PRN per RCU given current condition will medically manage likely stress induced     3) Covid +  - s/p remdesivir   - c/w supportive management   - t/t per primary team   -resolved    4) Abd distension   -CTA AP obtained which showed  partially occlusive calcified and non-calcified plaque just distal to take-off of the SMA, with estimated at least 75% luminal narrowing. Limited evaluation of its distal branches. 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     EKG SR RBBB (old per family)     1) Hypoxia   - s/p bronch   - Rt pleural effusion   - held lasix given Hypernatremia and worsening creat , CXR with cephalization, given  a does of IV lasix monitor u/o renal function    - f/u neuro recs MRI brain shows no acute disease  - s/p trach and PEG       2) CAD s/p CABG  - p/w chest pain. EKG non ischemic , trop -sera   - chest pains  Trop mildly elevated and trending down likely sec to kidney disease,   - holding brilinta, was on it for SMA stent placed in 12/2023, held 2/2 anticipation for PEG placement, restart when no further invasive procedures planned  -TTE 2/12 with  mod decrease LV systolic function, new. euvolemic on exam,on lasix PRN per RCU given current condition will medically manage likely stress induced     3) Covid +  - s/p remdesivir   - c/w supportive management   - t/t per primary team   -resolved    4) Abd distension   -CTA AP obtained which showed  partially occlusive calcified and non-calcified plaque just distal to take-off of the SMA, with estimated at least 75% luminal narrowing. Limited evaluation of its distal branches. 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   -on Protonix IV q 12      6) Afib  -hx of PAF  -no AC 2/2 GIB  -on coreg 6.25mg BID

## 2024-02-29 NOTE — CHART NOTE - NSCHARTNOTEFT_GEN_A_CORE
Source: Patient A&Ox [0 ]    Family [x ] Son      other [x ] Chart Review     Medical Course: 89M w/ DM2, HTN, CVA, PAD, CKD3, and CAD-CABG, 12/23/23 p/w COVID19 infection, c/b respiratory failure/hypotension; now s/p tracheostomy.     Nutrition Course: Patient is being follow up for severe malnutrition. At the time of visit, pt's TF running @ goal rate 50ml/hr x24 hrs to provide total volume of 1200ml/day, the current formula provided total 1800kcal, 91.8gm pro, and 910.8ml of free water/day. Patient's son by the bedside, who reports pt is tolerating the formula w/o any complains. Pt noted on Banatrol 2x daily. Last BM yesterday 2/28. Pt continues on Lasix which can cause weight fluctuations. As per RN flow sheet, pt noted with resolved 1+ generalized edema. Patient noted with POCT ~152-231, Pt currently on Low Carb TF formula Glucerna and insulin regimen for glycemic control. Recommend consider Endo consult for adjusting insulin given elevated POCT and A1C 9.3%. RD to remain available for further nutritional interventions as indicated.          Diet, NPO with Tube Feed:   Tube Feeding Modality: Gastrostomy  Glucerna 1.5 Prince (GLUCERNA1.5RTH)  Total Volume for 24 Hours (mL): 1200  Continuous  Starting Tube Feed Rate {mL per Hour}: 50  Until Goal Tube Feed Rate (mL per Hour): 50  Tube Feed Duration (in Hours): 24  Tube Feed Start Time: 09:30  Banatrol TF     Qty per Day:  2 (02-24-24 @ 13:44)      GI: WDL. Last BM 2/28      Anthropometrics: Height (cm): 175.3 (01-19)  Weight (kg): 79.3 (01-19)  BMI (kg/m2): 25.8 (01-19)    Edema: None reported at present.   Pressure Injuries: None reported at present.     __________________ Pertinent Medications__________________   MEDICATIONS  (STANDING):  albuterol/ipratropium for Nebulization 3 milliLiter(s) Nebulizer every 12 hours  artificial  tears Solution 1 Drop(s) Left EYE four times a day  aspirin  chewable 81 milliGRAM(s) Enteral Tube daily  carvedilol 6.25 milliGRAM(s) Oral every 12 hours  chlorhexidine 0.12% Liquid 15 milliLiter(s) Oral Mucosa every 12 hours  chlorhexidine 2% Cloths 1 Application(s) Topical daily  dextrose 5%. 1000 milliLiter(s) (100 mL/Hr) IV Continuous <Continuous>  dextrose 5%. 1000 milliLiter(s) (50 mL/Hr) IV Continuous <Continuous>  dextrose 50% Injectable 25 Gram(s) IV Push once  dextrose 50% Injectable 25 Gram(s) IV Push once  dextrose 50% Injectable 12.5 Gram(s) IV Push once  doxazosin 2 milliGRAM(s) Oral at bedtime  ertapenem  IVPB 1000 milliGRAM(s) IV Intermittent every 24 hours  escitalopram Solution 10 milliGRAM(s) Oral daily  glucagon  Injectable 1 milliGRAM(s) IntraMuscular once  heparin   Injectable 5000 Unit(s) SubCutaneous every 8 hours  hydrALAZINE 10 milliGRAM(s) Oral every 8 hours  insulin glargine Injectable (LANTUS) 12 Unit(s) SubCutaneous <User Schedule>  insulin lispro (ADMELOG) corrective regimen sliding scale   SubCutaneous every 6 hours  isosorbide   dinitrate Tablet (ISORDIL) 5 milliGRAM(s) Oral three times a day  levETIRAcetam  Solution 500 milliGRAM(s) Oral two times a day  melatonin 6 milliGRAM(s) Oral at bedtime  pantoprazole   Suspension 40 milliGRAM(s) Oral every 12 hours  petrolatum Ophthalmic Ointment 1 Application(s) Left EYE at bedtime  QUEtiapine 12.5 milliGRAM(s) Oral <User Schedule>  sucralfate suspension 1 Gram(s) Enteral Tube two times a day  ticagrelor 60 milliGRAM(s) Oral every 12 hours    MEDICATIONS  (PRN):  acetaminophen   Oral Liquid .. 650 milliGRAM(s) Oral every 6 hours PRN Temp greater or equal to 38C (100.4F), Mild Pain (1 - 3), Moderate Pain (4 - 6)  dextrose Oral Gel 15 Gram(s) Oral once PRN Blood Glucose LESS THAN 70 milliGRAM(s)/deciliter      __________________ Pertinent Labs__________________   02-29 Na141 mmol/L Glu 156 mg/dL<H> K+ 4.2 mmol/L Cr  1.36 mg/dL<H> BUN 37 mg/dL<H> 02-29 Phos 3.2 mg/dL 02-29 Alb 2.6 g/dL<L>        POCT Blood Glucose.: 213 mg/dL (02-29-24 @ 11:21)  POCT Blood Glucose.: 152 mg/dL (02-29-24 @ 05:41)  POCT Blood Glucose.: 178 mg/dL (02-28-24 @ 23:18)  POCT Blood Glucose.: 231 mg/dL (02-28-24 @ 16:59)  POCT Blood Glucose.: 224 mg/dL (02-28-24 @ 11:25)  POCT Blood Glucose.: 162 mg/dL (02-28-24 @ 05:09)  POCT Blood Glucose.: 210 mg/dL (02-27-24 @ 23:54)  POCT Blood Glucose.: 152 mg/dL (02-27-24 @ 17:18)  POCT Blood Glucose.: 171 mg/dL (02-27-24 @ 16:42)  POCT Blood Glucose.: 191 mg/dL (02-27-24 @ 11:22)  POCT Blood Glucose.: 185 mg/dL (02-27-24 @ 05:28)  POCT Blood Glucose.: 145 mg/dL (02-26-24 @ 23:35)  POCT Blood Glucose.: 201 mg/dL (02-26-24 @ 17:19)          Estimated Needs:   [x ] no change since previous assessment      Previous Nutrition Diagnosis: Severe Protein calorie malnutrition     Nutrition Diagnosis is [x ] ongoing      Recommendations:  1) Recommend cont. to provide current TF regimen.   2) Recommend cont. with Banatrol 2x daily for anti-diarrhea.   3) Recommend consider Endo consult given elevated POCT/A1C.   4) Monitor TF tolerance, Labs, weights, BMs, and skin integrity.   5) RD to remain available for further nutritional interventions as indicated.       Monitoring and Evaluation:      [ x] Tolerance to diet prescription [x ] weights [x ] follow up per protocol  [ ] other:

## 2024-02-29 NOTE — PROGRESS NOTE ADULT - SUBJECTIVE AND OBJECTIVE BOX
NEPHROLOGY-[facility] [phone]        Patient seen and examined trach to vent.   no distress . son at bedside    ROS :UTO      MEDICATIONS  (STANDING):  albuterol/ipratropium for Nebulization 3 milliLiter(s) Nebulizer every 12 hours  artificial  tears Solution 1 Drop(s) Left EYE four times a day  aspirin  chewable 81 milliGRAM(s) Enteral Tube daily  carvedilol 6.25 milliGRAM(s) Oral every 12 hours  chlorhexidine 0.12% Liquid 15 milliLiter(s) Oral Mucosa every 12 hours  chlorhexidine 2% Cloths 1 Application(s) Topical daily  dextrose 5%. 1000 milliLiter(s) (100 mL/Hr) IV Continuous <Continuous>  dextrose 5%. 1000 milliLiter(s) (50 mL/Hr) IV Continuous <Continuous>  dextrose 50% Injectable 25 Gram(s) IV Push once  dextrose 50% Injectable 12.5 Gram(s) IV Push once  dextrose 50% Injectable 25 Gram(s) IV Push once  doxazosin 2 milliGRAM(s) Oral at bedtime  escitalopram Solution 10 milliGRAM(s) Oral daily  glucagon  Injectable 1 milliGRAM(s) IntraMuscular once  heparin   Injectable 5000 Unit(s) SubCutaneous every 8 hours  hydrALAZINE 10 milliGRAM(s) Oral every 8 hours  insulin glargine Injectable (LANTUS) 12 Unit(s) SubCutaneous <User Schedule>  insulin lispro (ADMELOG) corrective regimen sliding scale   SubCutaneous every 6 hours  levETIRAcetam  Solution 500 milliGRAM(s) Oral two times a day  melatonin 6 milliGRAM(s) Oral at bedtime  pantoprazole   Suspension 40 milliGRAM(s) Oral every 12 hours  petrolatum Ophthalmic Ointment 1 Application(s) Left EYE at bedtime  sucralfate suspension 1 Gram(s) Enteral Tube two times a day  ticagrelor 60 milliGRAM(s) Oral every 12 hours      VITAL:  T(C): , Max: 36.9 (02-26-24 @ 12:00)  T(F): , Max: 98.4 (02-26-24 @ 12:00)  HR: 93 (02-26-24 @ 12:00)  BP: 137/88 (02-26-24 @ 12:00)  BP(mean): --  RR: 19 (02-26-24 @ 12:00)  SpO2: 99% (02-26-24 @ 12:00)  Wt(kg): --    I and O's:    02-25 @ 07:01  -  02-26 @ 07:00  --------------------------------------------------------  IN: 2140 mL / OUT: 2150 mL / NET: -10 mL        PHYSICAL EXAM:  Constitutional: NAD trach to vent   HEENT: +trach  Respiratory: coarse bs   Cardiovascular: normal s1s2  Gastrointestinal: BS+, soft, NT/ND +PEG  Extremities:+ peripheral edema  :  + external catheter   Skin: No rashes      LABS:                        8.7    8.17  )-----------( 402      ( 26 Feb 2024 05:40 )             29.0     02-26    147<H>  |  107  |  34<H>  ----------------------------<  175<H>  3.5   |  30  |  1.40<H>    Ca    8.8      26 Feb 2024 05:40  Phos  2.7     02-26  Mg     2.00     02-26            Urine Studies:  Urinalysis Basic - ( 26 Feb 2024 05:40 )    Color: x / Appearance: x / SG: x / pH: x  Gluc: 175 mg/dL / Ketone: x  / Bili: x / Urobili: x   Blood: x / Protein: x / Nitrite: x   Leuk Esterase: x / RBC: x / WBC x   Sq Epi: x / Non Sq Epi: x / Bacteria: x      IMPRESSION:  89M w/ DM2, HTN, CVA, PAD, CKD3, and CAD-CABG, 12/23/23 p/w COVID19 infection, c/b respiratory failure/hypotension; now s/p tracheostomy    (1)Renal - CKD3; superimposed mild prerenal azotemia - numbers fluctuating based on hemodynamic status  (2)Hypokalemia - improved s/p repletion   (3)CV - now off pressors and midodrine, BP improved/stable    (4)Pulm - vent-dependent   (5ID - afebrile, s/p zosyn   (6)GI - s/p PEG (2/20)  (7)Hypernatremia - possibly due to diuresis, on free water    RECOMMEND:   (1)cont lasix prn for hypervolemia   (2)decrease  Free water 200 cc q4h   (3)Continue meds for GFR 30-40ml/min      [facility]  Office: [phone]

## 2024-02-29 NOTE — PROGRESS NOTE ADULT - NS ATTEND AMEND GEN_ALL_CORE FT
agree with above  on erta, esbl klesiella sputum  afebrile  renal and cardiolg f/u appreciated  make serquel standing

## 2024-02-29 NOTE — PROGRESS NOTE ADULT - SUBJECTIVE AND OBJECTIVE BOX
CHIEF COMPLAINT: Patient is a 89y old  Male who presents with a chief complaint of COVID19, Chest pain (28 Feb 2024 14:08)      INTERVAL EVENTS:  - No interval events overnight   - SCx with ESBL kleb and zosyn changed to erta   - Na better and lasix given   - Isordil added to optimize GDMT     REVIEW OF SYSTEMS: Seen by bedside during AM rounds and     Mode: AC/ CMV (Assist Control/ Continuous Mandatory Ventilation), RR (machine): 12, TV (machine): 450, FiO2: 30, PEEP: 5, ITime: 0.6, MAP: 12, PIP: 30      OBJECTIVE:  ICU Vital Signs Last 24 Hrs  T(C): 37.1 (29 Feb 2024 05:40), Max: 37.1 (29 Feb 2024 05:40)  T(F): 98.8 (29 Feb 2024 05:40), Max: 98.8 (29 Feb 2024 05:40)  HR: 90 (29 Feb 2024 05:40) (86 - 99)  BP: 127/68 (29 Feb 2024 05:40) (121/58 - 134/85)  BP(mean): --  ABP: --  ABP(mean): --  RR: 19 (29 Feb 2024 05:40) (18 - 19)  SpO2: 99% (29 Feb 2024 05:40) (96% - 100%)    O2 Parameters below as of 29 Feb 2024 05:40  Patient On (Oxygen Delivery Method): ventilator          Mode: AC/ CMV (Assist Control/ Continuous Mandatory Ventilation), RR (machine): 12, TV (machine): 450, FiO2: 30, PEEP: 5, ITime: 0.6, MAP: 12, PIP: 30    02-28 @ 07:01  -  02-29 @ 07:00  --------------------------------------------------------  IN: 2300 mL / OUT: 1265 mL / NET: 1035 mL      CAPILLARY BLOOD GLUCOSE  POCT Blood Glucose.: 152 mg/dL (29 Feb 2024 05:41)      HOSPITAL MEDICATIONS:  MEDICATIONS  (STANDING):  albuterol/ipratropium for Nebulization 3 milliLiter(s) Nebulizer every 12 hours  artificial  tears Solution 1 Drop(s) Left EYE four times a day  aspirin  chewable 81 milliGRAM(s) Enteral Tube daily  carvedilol 6.25 milliGRAM(s) Oral every 12 hours  chlorhexidine 0.12% Liquid 15 milliLiter(s) Oral Mucosa every 12 hours  chlorhexidine 2% Cloths 1 Application(s) Topical daily  dextrose 5%. 1000 milliLiter(s) (50 mL/Hr) IV Continuous <Continuous>  dextrose 5%. 1000 milliLiter(s) (100 mL/Hr) IV Continuous <Continuous>  dextrose 50% Injectable 25 Gram(s) IV Push once  dextrose 50% Injectable 12.5 Gram(s) IV Push once  dextrose 50% Injectable 25 Gram(s) IV Push once  doxazosin 2 milliGRAM(s) Oral at bedtime  ertapenem  IVPB 1000 milliGRAM(s) IV Intermittent every 24 hours  escitalopram Solution 10 milliGRAM(s) Oral daily  furosemide   Injectable 40 milliGRAM(s) IV Push once  glucagon  Injectable 1 milliGRAM(s) IntraMuscular once  heparin   Injectable 5000 Unit(s) SubCutaneous every 8 hours  hydrALAZINE 10 milliGRAM(s) Oral every 8 hours  insulin glargine Injectable (LANTUS) 12 Unit(s) SubCutaneous <User Schedule>  insulin lispro (ADMELOG) corrective regimen sliding scale   SubCutaneous every 6 hours  isosorbide   dinitrate Tablet (ISORDIL) 5 milliGRAM(s) Oral three times a day  levETIRAcetam  Solution 500 milliGRAM(s) Oral two times a day  melatonin 6 milliGRAM(s) Oral at bedtime  pantoprazole   Suspension 40 milliGRAM(s) Oral every 12 hours  petrolatum Ophthalmic Ointment 1 Application(s) Left EYE at bedtime  sucralfate suspension 1 Gram(s) Enteral Tube two times a day  ticagrelor 60 milliGRAM(s) Oral every 12 hours    MEDICATIONS  (PRN):  acetaminophen   Oral Liquid .. 650 milliGRAM(s) Oral every 6 hours PRN Temp greater or equal to 38C (100.4F), Mild Pain (1 - 3), Moderate Pain (4 - 6)  dextrose Oral Gel 15 Gram(s) Oral once PRN Blood Glucose LESS THAN 70 milliGRAM(s)/deciliter      PHYSICAL EXAMINATION    LABS:                        8.6    7.23  )-----------( 395      ( 29 Feb 2024 05:45 )             27.7     02-29    141  |  102  |  37<H>  ----------------------------<  156<H>  4.2   |  30  |  1.36<H>    Ca    8.3<L>      29 Feb 2024 05:45  Phos  3.2     02-29  Mg     2.20     02-29        Urinalysis Basic - ( 29 Feb 2024 05:45 )  Color: x / Appearance: x / SG: x / pH: x  Gluc: 156 mg/dL / Ketone: x  / Bili: x / Urobili: x   Blood: x / Protein: x / Nitrite: x   Leuk Esterase: x / RBC: x / WBC x   Sq Epi: x / Non Sq Epi: x / Bacteria: x            PAST MEDICAL & SURGICAL HISTORY:  Hyperlipemia      Hypertension      Coronary Artery Disease      Diabetes Mellitus Type II      Stented Coronary Artery  total 5 stents, last stent 5/2019      Neuropathy      Myocardial infarction      Stroke  mild left facial numbness   no other residuals verbalized      Myoclonic jerking      Stage 3 chronic kidney disease      History of Cataract Extraction      Hx of CABG      H/O coronary angiogram      S/P coronary artery stent placement  1/6/09      S/P placement of cardiac pacemaker          FAMILY HISTORY:  No pertinent family history in first degree relatives        Social History:  Lives with family  Ambulates with walker  Denies tobacco, EtOH, or illicit substance use (23 Dec 2023 22:51)      RADIOLOGY:  [ ] Reviewed and interpreted by me    PULMONARY FUNCTION TESTS:    EKG: CHIEF COMPLAINT: Patient is a 89y old  Male who presents with a chief complaint of COVID19, Chest pain (28 Feb 2024 14:08)      INTERVAL EVENTS:  - No interval events overnight   - SCx with ESBL kleb and zosyn changed to erta   - Na better and lasix given   - Isordil added to optimize GDMT     REVIEW OF SYSTEMS: Seen by bedside during AM rounds and unable to assess ROS as vented     Mode: AC/ CMV (Assist Control/ Continuous Mandatory Ventilation), RR (machine): 12, TV (machine): 450, FiO2: 30, PEEP: 5, ITime: 0.6, MAP: 12, PIP: 30      OBJECTIVE:  ICU Vital Signs Last 24 Hrs  T(C): 37.1 (29 Feb 2024 05:40), Max: 37.1 (29 Feb 2024 05:40)  T(F): 98.8 (29 Feb 2024 05:40), Max: 98.8 (29 Feb 2024 05:40)  HR: 90 (29 Feb 2024 05:40) (86 - 99)  BP: 127/68 (29 Feb 2024 05:40) (121/58 - 134/85)  BP(mean): --  ABP: --  ABP(mean): --  RR: 19 (29 Feb 2024 05:40) (18 - 19)  SpO2: 99% (29 Feb 2024 05:40) (96% - 100%)    O2 Parameters below as of 29 Feb 2024 05:40  Patient On (Oxygen Delivery Method): ventilator          Mode: AC/ CMV (Assist Control/ Continuous Mandatory Ventilation), RR (machine): 12, TV (machine): 450, FiO2: 30, PEEP: 5, ITime: 0.6, MAP: 12, PIP: 30    02-28 @ 07:01  -  02-29 @ 07:00  --------------------------------------------------------  IN: 2300 mL / OUT: 1265 mL / NET: 1035 mL      CAPILLARY BLOOD GLUCOSE  POCT Blood Glucose.: 152 mg/dL (29 Feb 2024 05:41)      HOSPITAL MEDICATIONS:  MEDICATIONS  (STANDING):  albuterol/ipratropium for Nebulization 3 milliLiter(s) Nebulizer every 12 hours  artificial  tears Solution 1 Drop(s) Left EYE four times a day  aspirin  chewable 81 milliGRAM(s) Enteral Tube daily  carvedilol 6.25 milliGRAM(s) Oral every 12 hours  chlorhexidine 0.12% Liquid 15 milliLiter(s) Oral Mucosa every 12 hours  chlorhexidine 2% Cloths 1 Application(s) Topical daily  dextrose 5%. 1000 milliLiter(s) (50 mL/Hr) IV Continuous <Continuous>  dextrose 5%. 1000 milliLiter(s) (100 mL/Hr) IV Continuous <Continuous>  dextrose 50% Injectable 25 Gram(s) IV Push once  dextrose 50% Injectable 12.5 Gram(s) IV Push once  dextrose 50% Injectable 25 Gram(s) IV Push once  doxazosin 2 milliGRAM(s) Oral at bedtime  ertapenem  IVPB 1000 milliGRAM(s) IV Intermittent every 24 hours  escitalopram Solution 10 milliGRAM(s) Oral daily  furosemide   Injectable 40 milliGRAM(s) IV Push once  glucagon  Injectable 1 milliGRAM(s) IntraMuscular once  heparin   Injectable 5000 Unit(s) SubCutaneous every 8 hours  hydrALAZINE 10 milliGRAM(s) Oral every 8 hours  insulin glargine Injectable (LANTUS) 12 Unit(s) SubCutaneous <User Schedule>  insulin lispro (ADMELOG) corrective regimen sliding scale   SubCutaneous every 6 hours  isosorbide   dinitrate Tablet (ISORDIL) 5 milliGRAM(s) Oral three times a day  levETIRAcetam  Solution 500 milliGRAM(s) Oral two times a day  melatonin 6 milliGRAM(s) Oral at bedtime  pantoprazole   Suspension 40 milliGRAM(s) Oral every 12 hours  petrolatum Ophthalmic Ointment 1 Application(s) Left EYE at bedtime  sucralfate suspension 1 Gram(s) Enteral Tube two times a day  ticagrelor 60 milliGRAM(s) Oral every 12 hours    MEDICATIONS  (PRN):  acetaminophen   Oral Liquid .. 650 milliGRAM(s) Oral every 6 hours PRN Temp greater or equal to 38C (100.4F), Mild Pain (1 - 3), Moderate Pain (4 - 6)  dextrose Oral Gel 15 Gram(s) Oral once PRN Blood Glucose LESS THAN 70 milliGRAM(s)/deciliter      PHYSICAL EXAMINATION  General: NAD   HEENT: Trach present   Cards: S1/S2, no murmurs   Pulm: Course vent sounds bilaterally with diminished bases. No wheezes.   Abdomen: Soft, nondistended and nontender. BS (+) PEG present.   Extremities: Mild pedal edema bilaterally. GEORGE of BL upper > lower extremities when spoken to in native language.  Neurology: Awake with eyes open and intermittently follows commands when spoken to in native language but limited by weakness with no acute focal neurological deficits     LABS:                        8.6    7.23  )-----------( 395      ( 29 Feb 2024 05:45 )             27.7     02-29    141  |  102  |  37<H>  ----------------------------<  156<H>  4.2   |  30  |  1.36<H>    Ca    8.3<L>      29 Feb 2024 05:45  Phos  3.2     02-29  Mg     2.20     02-29        Urinalysis Basic - ( 29 Feb 2024 05:45 )  Color: x / Appearance: x / SG: x / pH: x  Gluc: 156 mg/dL / Ketone: x  / Bili: x / Urobili: x   Blood: x / Protein: x / Nitrite: x   Leuk Esterase: x / RBC: x / WBC x   Sq Epi: x / Non Sq Epi: x / Bacteria: x            PAST MEDICAL & SURGICAL HISTORY:  Hyperlipemia      Hypertension      Coronary Artery Disease      Diabetes Mellitus Type II      Stented Coronary Artery  total 5 stents, last stent 5/2019      Neuropathy      Myocardial infarction      Stroke  mild left facial numbness   no other residuals verbalized      Myoclonic jerking      Stage 3 chronic kidney disease      History of Cataract Extraction      Hx of CABG      H/O coronary angiogram      S/P coronary artery stent placement  1/6/09      S/P placement of cardiac pacemaker          FAMILY HISTORY:  No pertinent family history in first degree relatives        Social History:  Lives with family  Ambulates with walker  Denies tobacco, EtOH, or illicit substance use (23 Dec 2023 22:51)      RADIOLOGY:  [ ] Reviewed and interpreted by me    PULMONARY FUNCTION TESTS:    EKG:

## 2024-02-29 NOTE — PROGRESS NOTE ADULT - ASSESSMENT
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 Course ultimately complicated by progressive WOB and O2 demand and patient intubated and transferred to MICU (1/22). Course further complicated by acute cholecystitis and s/p Perc Lynsey with IR on (1/26), HFrEF 38, pulmonary edema, superimposed PNA, failed extubation failed and prolonged vent time and s/p trach (2/13). Patient transferred to RCU (2/16) and s/p PEG (2/20)     NEUROLOGY   # Encephalopathy   - Hx of CVA with no residual deficits per family, however per family worsening confusion at home over the past 6 months (becoming disoriented to time) but prior to admission has been more confused, talking about seeing people that are not present.  - CTH (1/31) with no evidence of acute infarct  - Continue on ASA and brilinta  - Holding Neurontin, Memantine and Oxycodone in setting of somnolence    # ICA stenosis   - CTA NECK (1/31) with moderate to severe narrowing left internal carotid at the origin. Severe narrowing right internal carotid by a tandem lesion 1.5 cm above the bifurcation with a narrowed internal carotid beyond the lesion. Extensive calcified plaque both cavernous and supraclinoid carotid arteries with narrowing but no occlusion. Mild narrowing proximal right M1 segment. Moderate narrowing both vertebral V4 segments. No perfusion abnormality at the Tmax 6 second threshold, but moderate hypoperfusion in the right MCA territory at the 4 second threshold.   - Continue on ASA and brilinta    # Myoclonic jerks   - Hx of myoclonic jerks post CVAs   - EEG (1/18-2/6) negative for seizures  - Continue on keppra and per neuro wishes to wean, however family wishes to keep current dose as home medication.     # Depression   - Continue on lexapro per home medication   - Insomnic per family and melatonin added   - Supportive care     CARDIOLOGY   # Vasoplegic vs septic shock  # Hx of HTN   - Weaned off pressors in ICU and now with mild hypertension   - Continue on lopressor as below and monitor HR     # AFIB RVR   # Bifasicular Block with Medtronic PPM   - AFIB RVR episodes and PPM interrogated (2/20) by EP with similar episodes  - Previous admission in 6/2022 with cardiogenic syncope second to bifasicular block, however now with PPM and AV myron blockers ok.   - Continue on lopressor 12.5mg BID however replaced with coreg 6.25 second to HFrEF   - AC discussed at length however with recent GIB will hold for now     # HFrEF 38 likely stress induced?   # Mild to moderate AS   - ECHO (12/2023) with EF 62 with normal LVSF and mild to moderate AS   - ECHO (2/2024) with EF 38, moderate LVSD with global LV hypokinesis, mildly reduced RVSF (TAPSE 1.3), mild to moderate MR, mild AR, and moderate AS.   - Continue on coreg 6.25, hydral 10 and isordil 5   - Continue on diuresis by dose (lasix 40mg IVP given 2/29)  - Given moderate AS monitor BP closely and avoid hypertension     # CAD with CABG   - CATH (5/2022) with dLAD 70, SVG graft to OM1 with 90 in stent stenosis s/p PCI and GEMMA placement, and LIMA graft to mLAD with no disease.   - Continue on ASA and brilinta    RESPIRATORY   # Acute respiratory failure second to SARS-COV-2 vs pulm edema vs PNA  - Presented with COVID complicated by sepsis second to acute lynsey and worsening respiratory failure second to pulmonary edema requiring intubation.   - Prolonged intubation and s/p tracheostomy (2/13)   - CT CHEST 1/16 with new small bilateral pleural effusions/ atelectasis/ patchy bilateral ground-glass opacities are consistent with pulmonary edema.  - Completed ABX and COVID regimen as below   - Continue on vent and initially tolerates SBT 15/5 however now with poor tolerance second to weakness vs volume   - Continue on nebs and hold further HTS given intermittent hemoptysis  - Continue on diuresis as above    # Mild hemoptysis likely from suction trauma   - s/p bronch (2/18) with no active bleed  - Hemoptysis improved with TXA and holding further HTS as above   - Tiny hemoptysis second to suction trauma imporving  - Careful with suctioning     HEENT   # L eye subconjunctival hemorrhage   - Continue on artifical tears and lacrilube   - Optho eval appreciated     GI  # Nutrition/ Dysphagia   - PEG placed by GI on 2/20 and tube feeds continued.   - Loose stools and banatrol continued     # UGIB   - EGD (1/2) with esophagitis and bleeding Dieulafoy's lesion s/p clips.   - Continue on PPI and carafate     # SMA calcification   - CTA demonstrating mesenteric fat stranding associated with ascending/transverse colon  - Diagnostic laparoscopy (12/24) with small bowel and visible colon viable, some inflammation of omentum in RUQ  - SMA Angiogram and stent placed (12/25).   - Continue on ASA and brilinta     # Acute Cholecystitis   - CT (12/26) with distended GB with cholelithiasis and sludge   - HIDA (12/29) POSITIVE for acute cholecystitis   - Case discussed with IR at length and s/p PERC LYNSEY (1/26)   - Completed zosyn course (12/24-12/31)   - Fever spike 2/25 and pending LFTs    / RENAL   # CKD   - CRE at baseline   - Monitor renal function and UOP     # Hypernatremia   -  Q4H added with improvement   - Monitor closely with diuresis as above     INFECTIOUS DISEASE   # COVID with superimposed PNA   # ESBL Kleb PNA   - COVID POSITIVE (12/23) and completed remdesivir x 1 dose and paxlovid course.   - WOB worsened as above requiring intubation and cultures negative. Zosyn completed empirically however hypothermic (2/11) and BCx, UA, RVP and BAL negative.   - Completed additional zosyn (2/12-2/19) empirically   - Fever spike and thick secretions and SCX (2/26) with ESBL klebs.   - Started on Zosyn (2/27 - 2/29) but resistant and changed to Erta (2/29 - )     HEME  # AOCD   - Monitor HH   - DVT PPX with HSQ     ENDOCRINE   # DM2 A1C 9.3   - Continue on lantus 12 and ISS     SKIN/ TUBES   - Trach (2/13)   - PEG (2/20)   - PERC LYNSEY (1/26 - )     ETHICS   - FULL CODE     DISPO - vent facility and family aware. SW aware to send out to facilities and family to discuss on DISPO plan.    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 Course ultimately complicated by progressive WOB and O2 demand and patient intubated and transferred to MICU (1/22). Course further complicated by acute cholecystitis and s/p Perc Lynsey with IR on (1/26), HFrEF 38, pulmonary edema, superimposed PNA, failed extubation failed and prolonged vent time and s/p trach (2/13). Patient transferred to RCU (2/16) and s/p PEG (2/20)     NEUROLOGY   # Encephalopathy   - Hx of CVA with no residual deficits per family, however per family worsening confusion at home over the past 6 months (becoming disoriented to time) but prior to admission has been more confused, talking about seeing people that are not present.  - CTH (1/31) with no evidence of acute infarct  - Continue on ASA and brilinta  - Holding Neurontin, Memantine and Oxycodone in setting of somnolence    # ICA stenosis   - CTA NECK (1/31) with moderate to severe narrowing left internal carotid at the origin. Severe narrowing right internal carotid by a tandem lesion 1.5 cm above the bifurcation with a narrowed internal carotid beyond the lesion. Extensive calcified plaque both cavernous and supraclinoid carotid arteries with narrowing but no occlusion. Mild narrowing proximal right M1 segment. Moderate narrowing both vertebral V4 segments. No perfusion abnormality at the Tmax 6 second threshold, but moderate hypoperfusion in the right MCA territory at the 4 second threshold.   - Continue on ASA and brilinta    # Myoclonic jerks   - Hx of myoclonic jerks post CVAs   - EEG (1/18-2/6) negative for seizures  - Continue on keppra and per neuro wishes to wean, however family wishes to keep current dose as home medication.     # Depression/ Insomnia  - Continue on lexapro per home medication   - Insomnic per family and melatonin and seroquel 12.5 QHS added   - Supportive care     CARDIOLOGY   # Vasoplegic vs septic shock  # Hx of HTN   - Weaned off pressors in ICU and now with mild hypertension   - Continue on lopressor as below and monitor HR     # AFIB RVR   # Bifasicular Block with Medtronic PPM   - AFIB RVR episodes and PPM interrogated (2/20) by EP with similar episodes  - Previous admission in 6/2022 with cardiogenic syncope second to bifasicular block, however now with PPM and AV myron blockers ok.   - Continue on lopressor 12.5mg BID however replaced with coreg 6.25 second to HFrEF   - AC discussed at length however with recent GIB will hold for now     # HFrEF 38 likely stress induced?   # Mild to moderate AS   - ECHO (12/2023) with EF 62 with normal LVSF and mild to moderate AS   - ECHO (2/2024) with EF 38, moderate LVSD with global LV hypokinesis, mildly reduced RVSF (TAPSE 1.3), mild to moderate MR, mild AR, and moderate AS.   - Continue on coreg 6.25, hydral 10 and isordil 5   - Continue on diuresis by dose (lasix 40mg IVP given 2/29)  - Given moderate AS monitor BP closely and avoid hypertension     # CAD with CABG   - CATH (5/2022) with dLAD 70, SVG graft to OM1 with 90 in stent stenosis s/p PCI and GEMMA placement, and LIMA graft to mLAD with no disease.   - Continue on ASA and brilinta    RESPIRATORY   # Acute respiratory failure second to SARS-COV-2 vs pulm edema vs PNA  - Presented with COVID complicated by sepsis second to acute lynsey and worsening respiratory failure second to pulmonary edema requiring intubation.   - Prolonged intubation and s/p tracheostomy (2/13)   - CT CHEST 1/16 with new small bilateral pleural effusions/ atelectasis/ patchy bilateral ground-glass opacities are consistent with pulmonary edema.  - Completed ABX and COVID regimen as below   - Continue on vent and initially tolerates SBT 15/5 however now with poor tolerance second to weakness vs volume   - Continue on nebs and hold further HTS given intermittent hemoptysis  - Continue on diuresis as above    # Mild hemoptysis likely from suction trauma   - s/p bronch (2/18) with no active bleed  - Hemoptysis improved with TXA and holding further HTS as above   - Tiny hemoptysis second to suction trauma imporving  - Careful with suctioning     HEENT   # L eye subconjunctival hemorrhage   - Continue on artifical tears and lacrilube   - Optho eval appreciated     GI  # Nutrition/ Dysphagia   - PEG placed by GI on 2/20 and tube feeds continued.   - Loose stools and banatrol continued     # UGIB   - EGD (1/2) with esophagitis and bleeding Dieulafoy's lesion s/p clips.   - Continue on PPI and carafate     # SMA calcification   - CTA demonstrating mesenteric fat stranding associated with ascending/transverse colon  - Diagnostic laparoscopy (12/24) with small bowel and visible colon viable, some inflammation of omentum in RUQ  - SMA Angiogram and stent placed (12/25).   - Continue on ASA and brilinta     # Acute Cholecystitis   - CT (12/26) with distended GB with cholelithiasis and sludge   - HIDA (12/29) POSITIVE for acute cholecystitis   - Case discussed with IR at length and s/p PERC LYNSEY (1/26)   - Completed zosyn course (12/24-12/31)     / RENAL   # CKD   - CRE at baseline   - Monitor renal function and UOP     # Hypernatremia   -  Q4H added with improvement   - Monitor closely with diuresis as above     INFECTIOUS DISEASE   # COVID with superimposed PNA   # ESBL Kleb PNA   - COVID POSITIVE (12/23) and completed remdesivir x 1 dose and paxlovid course.   - WOB worsened as above requiring intubation and cultures negative. Zosyn completed empirically however hypothermic (2/11) and BCx, UA, RVP and BAL negative.   - Completed additional zosyn (2/12-2/19) empirically   - Fever spike and thick secretions and SCX (2/26) with ESBL klebs.   - Started on Zosyn (2/27 - 2/29) but resistant and changed to Erta (2/29 - )     HEME  # AOCD   - Monitor HH   - DVT PPX with HSQ     ENDOCRINE   # DM2 A1C 9.3   - Continue on lantus 12 and ISS     SKIN/ TUBES   - Trach (2/13)   - PEG (2/20)   - PERC LYNSEY (1/26 - )     ETHICS   - FULL CODE     DISPO - vent facility and family aware. SW aware to send out to facilities and family to discuss on DISPO plan.

## 2024-03-01 LAB
ALBUMIN SERPL ELPH-MCNC: 2.5 G/DL — LOW (ref 3.3–5)
ALP SERPL-CCNC: 166 U/L — HIGH (ref 40–120)
ALT FLD-CCNC: 23 U/L — SIGNIFICANT CHANGE UP (ref 4–41)
ANION GAP SERPL CALC-SCNC: 10 MMOL/L — SIGNIFICANT CHANGE UP (ref 7–14)
AST SERPL-CCNC: 37 U/L — SIGNIFICANT CHANGE UP (ref 4–40)
BASOPHILS # BLD AUTO: 0.04 K/UL — SIGNIFICANT CHANGE UP (ref 0–0.2)
BASOPHILS NFR BLD AUTO: 0.6 % — SIGNIFICANT CHANGE UP (ref 0–2)
BILIRUB DIRECT SERPL-MCNC: <0.2 MG/DL — SIGNIFICANT CHANGE UP (ref 0–0.3)
BILIRUB INDIRECT FLD-MCNC: >0.1 MG/DL — SIGNIFICANT CHANGE UP (ref 0–1)
BILIRUB SERPL-MCNC: 0.3 MG/DL — SIGNIFICANT CHANGE UP (ref 0.2–1.2)
BUN SERPL-MCNC: 37 MG/DL — HIGH (ref 7–23)
CALCIUM SERPL-MCNC: 8.4 MG/DL — SIGNIFICANT CHANGE UP (ref 8.4–10.5)
CHLORIDE SERPL-SCNC: 98 MMOL/L — SIGNIFICANT CHANGE UP (ref 98–107)
CO2 SERPL-SCNC: 30 MMOL/L — SIGNIFICANT CHANGE UP (ref 22–31)
CREAT SERPL-MCNC: 1.39 MG/DL — HIGH (ref 0.5–1.3)
EGFR: 48 ML/MIN/1.73M2 — LOW
EOSINOPHIL # BLD AUTO: 0.98 K/UL — HIGH (ref 0–0.5)
EOSINOPHIL NFR BLD AUTO: 14.2 % — HIGH (ref 0–6)
GLUCOSE BLDC GLUCOMTR-MCNC: 161 MG/DL — HIGH (ref 70–99)
GLUCOSE BLDC GLUCOMTR-MCNC: 184 MG/DL — HIGH (ref 70–99)
GLUCOSE BLDC GLUCOMTR-MCNC: 205 MG/DL — HIGH (ref 70–99)
GLUCOSE BLDC GLUCOMTR-MCNC: 226 MG/DL — HIGH (ref 70–99)
GLUCOSE SERPL-MCNC: 159 MG/DL — HIGH (ref 70–99)
HCT VFR BLD CALC: 27.5 % — LOW (ref 39–50)
HGB BLD-MCNC: 8.5 G/DL — LOW (ref 13–17)
IANC: 3.35 K/UL — SIGNIFICANT CHANGE UP (ref 1.8–7.4)
IMM GRANULOCYTES NFR BLD AUTO: 0.4 % — SIGNIFICANT CHANGE UP (ref 0–0.9)
LYMPHOCYTES # BLD AUTO: 1.89 K/UL — SIGNIFICANT CHANGE UP (ref 1–3.3)
LYMPHOCYTES # BLD AUTO: 27.4 % — SIGNIFICANT CHANGE UP (ref 13–44)
MAGNESIUM SERPL-MCNC: 2.2 MG/DL — SIGNIFICANT CHANGE UP (ref 1.6–2.6)
MCHC RBC-ENTMCNC: 28.6 PG — SIGNIFICANT CHANGE UP (ref 27–34)
MCHC RBC-ENTMCNC: 30.9 GM/DL — LOW (ref 32–36)
MCV RBC AUTO: 92.6 FL — SIGNIFICANT CHANGE UP (ref 80–100)
MONOCYTES # BLD AUTO: 0.61 K/UL — SIGNIFICANT CHANGE UP (ref 0–0.9)
MONOCYTES NFR BLD AUTO: 8.8 % — SIGNIFICANT CHANGE UP (ref 2–14)
NEUTROPHILS # BLD AUTO: 3.35 K/UL — SIGNIFICANT CHANGE UP (ref 1.8–7.4)
NEUTROPHILS NFR BLD AUTO: 48.6 % — SIGNIFICANT CHANGE UP (ref 43–77)
NRBC # BLD: 0 /100 WBCS — SIGNIFICANT CHANGE UP (ref 0–0)
NRBC # FLD: 0 K/UL — SIGNIFICANT CHANGE UP (ref 0–0)
PHOSPHATE SERPL-MCNC: 3.5 MG/DL — SIGNIFICANT CHANGE UP (ref 2.5–4.5)
PLATELET # BLD AUTO: 410 K/UL — HIGH (ref 150–400)
POTASSIUM SERPL-MCNC: 4 MMOL/L — SIGNIFICANT CHANGE UP (ref 3.5–5.3)
POTASSIUM SERPL-SCNC: 4 MMOL/L — SIGNIFICANT CHANGE UP (ref 3.5–5.3)
PROT SERPL-MCNC: 6.8 G/DL — SIGNIFICANT CHANGE UP (ref 6–8.3)
RBC # BLD: 2.97 M/UL — LOW (ref 4.2–5.8)
RBC # FLD: 16.3 % — HIGH (ref 10.3–14.5)
SODIUM SERPL-SCNC: 138 MMOL/L — SIGNIFICANT CHANGE UP (ref 135–145)
WBC # BLD: 6.9 K/UL — SIGNIFICANT CHANGE UP (ref 3.8–10.5)
WBC # FLD AUTO: 6.9 K/UL — SIGNIFICANT CHANGE UP (ref 3.8–10.5)

## 2024-03-01 PROCEDURE — 99233 SBSQ HOSP IP/OBS HIGH 50: CPT | Mod: FS

## 2024-03-01 PROCEDURE — 74018 RADEX ABDOMEN 1 VIEW: CPT | Mod: 26

## 2024-03-01 RX ORDER — SENNA PLUS 8.6 MG/1
10 TABLET ORAL AT BEDTIME
Refills: 0 | Status: DISCONTINUED | OUTPATIENT
Start: 2024-03-01 | End: 2024-03-27

## 2024-03-01 RX ORDER — POLYETHYLENE GLYCOL 3350 17 G/17G
17 POWDER, FOR SOLUTION ORAL DAILY
Refills: 0 | Status: DISCONTINUED | OUTPATIENT
Start: 2024-03-01 | End: 2024-03-27

## 2024-03-01 RX ORDER — FUROSEMIDE 40 MG
40 TABLET ORAL ONCE
Refills: 0 | Status: COMPLETED | OUTPATIENT
Start: 2024-03-01 | End: 2024-03-01

## 2024-03-01 RX ADMIN — HEPARIN SODIUM 5000 UNIT(S): 5000 INJECTION INTRAVENOUS; SUBCUTANEOUS at 13:20

## 2024-03-01 RX ADMIN — HEPARIN SODIUM 5000 UNIT(S): 5000 INJECTION INTRAVENOUS; SUBCUTANEOUS at 05:08

## 2024-03-01 RX ADMIN — CARVEDILOL PHOSPHATE 6.25 MILLIGRAM(S): 80 CAPSULE, EXTENDED RELEASE ORAL at 05:07

## 2024-03-01 RX ADMIN — PANTOPRAZOLE SODIUM 40 MILLIGRAM(S): 20 TABLET, DELAYED RELEASE ORAL at 17:13

## 2024-03-01 RX ADMIN — ISOSORBIDE DINITRATE 5 MILLIGRAM(S): 5 TABLET ORAL at 11:22

## 2024-03-01 RX ADMIN — CHLORHEXIDINE GLUCONATE 1 APPLICATION(S): 213 SOLUTION TOPICAL at 11:22

## 2024-03-01 RX ADMIN — ISOSORBIDE DINITRATE 5 MILLIGRAM(S): 5 TABLET ORAL at 05:07

## 2024-03-01 RX ADMIN — Medication 1 DROP(S): at 11:22

## 2024-03-01 RX ADMIN — HEPARIN SODIUM 5000 UNIT(S): 5000 INJECTION INTRAVENOUS; SUBCUTANEOUS at 22:10

## 2024-03-01 RX ADMIN — ISOSORBIDE DINITRATE 5 MILLIGRAM(S): 5 TABLET ORAL at 17:12

## 2024-03-01 RX ADMIN — Medication 81 MILLIGRAM(S): at 11:22

## 2024-03-01 RX ADMIN — Medication 1 APPLICATION(S): at 22:13

## 2024-03-01 RX ADMIN — Medication 6 MILLIGRAM(S): at 22:10

## 2024-03-01 RX ADMIN — Medication 4: at 17:53

## 2024-03-01 RX ADMIN — QUETIAPINE FUMARATE 12.5 MILLIGRAM(S): 200 TABLET, FILM COATED ORAL at 22:09

## 2024-03-01 RX ADMIN — Medication 1 DROP(S): at 00:00

## 2024-03-01 RX ADMIN — LEVETIRACETAM 500 MILLIGRAM(S): 250 TABLET, FILM COATED ORAL at 05:08

## 2024-03-01 RX ADMIN — LEVETIRACETAM 500 MILLIGRAM(S): 250 TABLET, FILM COATED ORAL at 17:12

## 2024-03-01 RX ADMIN — Medication 2 MILLIGRAM(S): at 22:10

## 2024-03-01 RX ADMIN — CARVEDILOL PHOSPHATE 6.25 MILLIGRAM(S): 80 CAPSULE, EXTENDED RELEASE ORAL at 17:14

## 2024-03-01 RX ADMIN — Medication 1 DROP(S): at 17:10

## 2024-03-01 RX ADMIN — Medication 10 MILLIGRAM(S): at 13:20

## 2024-03-01 RX ADMIN — ERTAPENEM SODIUM 120 MILLIGRAM(S): 1 INJECTION, POWDER, LYOPHILIZED, FOR SOLUTION INTRAMUSCULAR; INTRAVENOUS at 13:19

## 2024-03-01 RX ADMIN — Medication 3 MILLILITER(S): at 08:17

## 2024-03-01 RX ADMIN — Medication 1 GRAM(S): at 17:13

## 2024-03-01 RX ADMIN — INSULIN GLARGINE 12 UNIT(S): 100 INJECTION, SOLUTION SUBCUTANEOUS at 11:20

## 2024-03-01 RX ADMIN — Medication 10 MILLIGRAM(S): at 05:07

## 2024-03-01 RX ADMIN — ESCITALOPRAM OXALATE 10 MILLIGRAM(S): 10 TABLET, FILM COATED ORAL at 11:20

## 2024-03-01 RX ADMIN — CHLORHEXIDINE GLUCONATE 15 MILLILITER(S): 213 SOLUTION TOPICAL at 05:06

## 2024-03-01 RX ADMIN — PANTOPRAZOLE SODIUM 40 MILLIGRAM(S): 20 TABLET, DELAYED RELEASE ORAL at 05:07

## 2024-03-01 RX ADMIN — TICAGRELOR 60 MILLIGRAM(S): 90 TABLET ORAL at 17:14

## 2024-03-01 RX ADMIN — Medication 3 MILLILITER(S): at 21:09

## 2024-03-01 RX ADMIN — Medication 4: at 11:21

## 2024-03-01 RX ADMIN — TICAGRELOR 60 MILLIGRAM(S): 90 TABLET ORAL at 05:08

## 2024-03-01 RX ADMIN — Medication 10 MILLIGRAM(S): at 22:10

## 2024-03-01 RX ADMIN — Medication 1 GRAM(S): at 05:08

## 2024-03-01 RX ADMIN — Medication 40 MILLIGRAM(S): at 17:06

## 2024-03-01 RX ADMIN — CHLORHEXIDINE GLUCONATE 15 MILLILITER(S): 213 SOLUTION TOPICAL at 17:13

## 2024-03-01 RX ADMIN — Medication 2: at 05:25

## 2024-03-01 NOTE — PROGRESS NOTE ADULT - ASSESSMENT
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 Course ultimately complicated by progressive WOB and O2 demand and patient intubated and transferred to MICU (1/22). Course further complicated by acute cholecystitis and s/p Perc Lynsey with IR on (1/26), HFrEF 38, pulmonary edema, superimposed PNA, failed extubation failed and prolonged vent time and s/p trach (2/13). Patient transferred to RCU (2/16) and s/p PEG (2/20)     NEUROLOGY   # Encephalopathy   - Hx of CVA with no residual deficits per family, however per family worsening confusion at home over the past 6 months (becoming disoriented to time) but prior to admission has been more confused, talking about seeing people that are not present.  - CTH (1/31) with no evidence of acute infarct  - Continue on ASA and brilinta  - Holding Neurontin, Memantine and Oxycodone in setting of somnolence    # ICA stenosis   - CTA NECK (1/31) with moderate to severe narrowing left internal carotid at the origin. Severe narrowing right internal carotid by a tandem lesion 1.5 cm above the bifurcation with a narrowed internal carotid beyond the lesion. Extensive calcified plaque both cavernous and supraclinoid carotid arteries with narrowing but no occlusion. Mild narrowing proximal right M1 segment. Moderate narrowing both vertebral V4 segments. No perfusion abnormality at the Tmax 6 second threshold, but moderate hypoperfusion in the right MCA territory at the 4 second threshold.   - Continue on ASA and brilinta    # Myoclonic jerks   - Hx of myoclonic jerks post CVAs   - EEG (1/18-2/6) negative for seizures  - Continue on keppra and per neuro wishes to wean, however family wishes to keep current dose as home medication.     # Depression/ Insomnia  - Continue on lexapro per home medication   - Insomnic per family and melatonin and seroquel 12.5 QHS added   - Supportive care     CARDIOLOGY   # Vasoplegic vs septic shock  # Hx of HTN   - Weaned off pressors in ICU and now with mild hypertension   - Continue on lopressor as below and monitor HR     # AFIB RVR   # Bifasicular Block with Medtronic PPM   - AFIB RVR episodes and PPM interrogated (2/20) by EP with similar episodes  - Previous admission in 6/2022 with cardiogenic syncope second to bifasicular block, however now with PPM and AV myron blockers ok.   - Continue on lopressor 12.5mg BID however replaced with coreg 6.25 second to HFrEF   - AC discussed at length however with recent GIB will hold for now     # HFrEF 38 likely stress induced?   # Mild to moderate AS   - ECHO (12/2023) with EF 62 with normal LVSF and mild to moderate AS   - ECHO (2/2024) with EF 38, moderate LVSD with global LV hypokinesis, mildly reduced RVSF (TAPSE 1.3), mild to moderate MR, mild AR, and moderate AS.   - Continue on coreg 6.25, hydral 10 and isordil 5   - Continue on diuresis by dose (lasix 40mg IVP given 2/29)  - Given moderate AS monitor BP closely and avoid hypertension     # CAD with CABG   - CATH (5/2022) with dLAD 70, SVG graft to OM1 with 90 in stent stenosis s/p PCI and GEMMA placement, and LIMA graft to mLAD with no disease.   - Continue on ASA and brilinta    RESPIRATORY   # Acute respiratory failure second to SARS-COV-2 vs pulm edema vs PNA  - Presented with COVID complicated by sepsis second to acute lynsey and worsening respiratory failure second to pulmonary edema requiring intubation.   - Prolonged intubation and s/p tracheostomy (2/13)   - CT CHEST 1/16 with new small bilateral pleural effusions/ atelectasis/ patchy bilateral ground-glass opacities are consistent with pulmonary edema.  - Completed ABX and COVID regimen as below   - Continue on vent and initially tolerates SBT 15/5 however now with poor tolerance second to weakness vs volume   - Continue on nebs and hold further HTS given intermittent hemoptysis  - Continue on diuresis as above    # Mild hemoptysis likely from suction trauma   - s/p bronch (2/18) with no active bleed  - Hemoptysis improved with TXA and holding further HTS as above   - Tiny hemoptysis second to suction trauma imporving  - Careful with suctioning     HEENT   # L eye subconjunctival hemorrhage   - Continue on artifical tears and lacrilube   - Optho eval appreciated     GI  # Nutrition/ Dysphagia   - PEG placed by GI on 2/20 and tube feeds continued.   - Loose stools and banatrol continued     # UGIB   - EGD (1/2) with esophagitis and bleeding Dieulafoy's lesion s/p clips.   - Continue on PPI and carafate     # SMA calcification   - CTA demonstrating mesenteric fat stranding associated with ascending/transverse colon  - Diagnostic laparoscopy (12/24) with small bowel and visible colon viable, some inflammation of omentum in RUQ  - SMA Angiogram and stent placed (12/25).   - Continue on ASA and brilinta     # Acute Cholecystitis   - CT (12/26) with distended GB with cholelithiasis and sludge   - HIDA (12/29) POSITIVE for acute cholecystitis   - Case discussed with IR at length and s/p PERC LYNSEY (1/26)   - Completed zosyn course (12/24-12/31)     / RENAL   # CKD   - CRE at baseline   - Monitor renal function and UOP     # Hypernatremia   -  Q4H added with improvement   - Monitor closely with diuresis as above     INFECTIOUS DISEASE   # COVID with superimposed PNA   # ESBL Kleb PNA   - COVID POSITIVE (12/23) and completed remdesivir x 1 dose and paxlovid course.   - WOB worsened as above requiring intubation and cultures negative. Zosyn completed empirically however hypothermic (2/11) and BCx, UA, RVP and BAL negative.   - Completed additional zosyn (2/12-2/19) empirically   - Fever spike and thick secretions and SCX (2/26) with ESBL klebs.   - Started on Zosyn (2/27 - 2/29) but resistant and changed to Erta (2/29 - )     HEME  # AOCD   - Monitor HH   - DVT PPX with HSQ     ENDOCRINE   # DM2 A1C 9.3   - Continue on lantus 12 and ISS     SKIN/ TUBES   - Trach (2/13)   - PEG (2/20)   - PERC LYNSEY (1/26 - )     ETHICS   - FULL CODE     DISPO - vent facility and family aware. SW aware to send out to facilities and family to discuss on DISPO plan.    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 Course ultimately complicated by progressive WOB and O2 demand and patient intubated and transferred to MICU (1/22). Course further complicated by acute cholecystitis and s/p Perc Lynsey with IR on (1/26), HFrEF 38, pulmonary edema, superimposed PNA, failed extubation failed and prolonged vent time and s/p trach (2/13). Patient transferred to RCU (2/16) and s/p PEG (2/20)     NEUROLOGY   # Encephalopathy   - Hx of CVA with no residual deficits per family, however per family worsening confusion at home over the past 6 months (becoming disoriented to time) but prior to admission has been more confused, talking about seeing people that are not present.  - CTH (1/31) with no evidence of acute infarct  - Continue on ASA and brilinta  - Holding Neurontin, Memantine and Oxycodone in setting of somnolence    # ICA stenosis   - CTA NECK (1/31) with moderate to severe narrowing left internal carotid at the origin. Severe narrowing right internal carotid by a tandem lesion 1.5 cm above the bifurcation with a narrowed internal carotid beyond the lesion. Extensive calcified plaque both cavernous and supraclinoid carotid arteries with narrowing but no occlusion. Mild narrowing proximal right M1 segment. Moderate narrowing both vertebral V4 segments. No perfusion abnormality at the Tmax 6 second threshold, but moderate hypoperfusion in the right MCA territory at the 4 second threshold.   - Continue on ASA and brilinta    # Myoclonic jerks   - Hx of myoclonic jerks post CVAs   - EEG (1/18-2/6) negative for seizures  - Continue on keppra and per neuro wishes to wean, however family wishes to keep current dose as home medication.     # Depression/ Insomnia  - Continue on lexapro per home medication   - Insomnic per family and melatonin and seroquel 12.5 QHS added   - Supportive care     CARDIOLOGY   # Vasoplegic vs septic shock  # Hx of HTN   - Weaned off pressors in ICU and now with mild hypertension   - Continue on lopressor as below and monitor HR     # AFIB RVR   # Bifasicular Block with Medtronic PPM   - AFIB RVR episodes and PPM interrogated (2/20) by EP with similar episodes  - Previous admission in 6/2022 with cardiogenic syncope second to bifasicular block, however now with PPM and AV myron blockers ok.   - Continue on lopressor 12.5mg BID however replaced with coreg 6.25 second to HFrEF   - AC discussed at length however with recent GIB will hold for now     # HFrEF 38 likely stress induced?   # Mild to moderate AS   - ECHO (12/2023) with EF 62 with normal LVSF and mild to moderate AS   - ECHO (2/2024) with EF 38, moderate LVSD with global LV hypokinesis, mildly reduced RVSF (TAPSE 1.3), mild to moderate MR, mild AR, and moderate AS.   - Continue on coreg 6.25, hydral 10 and isordil 5   - Continue on diuresis by dose (lasix 40mg IVP given 3/1)  - Given moderate AS monitor BP closely and avoid hypertension     # CAD with CABG   - CATH (5/2022) with dLAD 70, SVG graft to OM1 with 90 in stent stenosis s/p PCI and GEMMA placement, and LIMA graft to mLAD with no disease.   - Continue on ASA and brilinta    RESPIRATORY   # Acute respiratory failure second to SARS-COV-2 vs pulm edema vs PNA  - Presented with COVID complicated by sepsis second to acute lynsey and worsening respiratory failure second to pulmonary edema requiring intubation.   - Prolonged intubation and s/p tracheostomy (2/13)   - CT CHEST 1/16 with new small bilateral pleural effusions/ atelectasis/ patchy bilateral ground-glass opacities are consistent with pulmonary edema.  - Completed ABX and COVID regimen as below   - Continue on vent and initially tolerates SBT 15/5 however now with poor tolerance second to weakness vs volume   - Continue on nebs and hold further HTS given intermittent hemoptysis  - Continue on diuresis as above    # Mild hemoptysis likely from suction trauma   - s/p bronch (2/18) with no active bleed  - Hemoptysis improved with TXA and holding further HTS as above   - Tiny hemoptysis second to suction trauma imporving  - Careful with suctioning     HEENT   # L eye subconjunctival hemorrhage   - Continue on artifical tears and lacrilube   - Optho eval appreciated     GI  # Nutrition/ Dysphagia   - PEG placed by GI on 2/20 and tube feeds continued.   - Loose stools and banatrol continued     # UGIB   - EGD (1/2) with esophagitis and bleeding Dieulafoy's lesion s/p clips.   - Continue on PPI and carafate     # SMA calcification   - CTA demonstrating mesenteric fat stranding associated with ascending/transverse colon  - Diagnostic laparoscopy (12/24) with small bowel and visible colon viable, some inflammation of omentum in RUQ  - SMA Angiogram and stent placed (12/25).   - Continue on ASA and brilinta     # Acute Cholecystitis   - CT (12/26) with distended GB with cholelithiasis and sludge   - HIDA (12/29) POSITIVE for acute cholecystitis   - Case discussed with IR at length and s/p PERC LYNSEY (1/26)   - Completed zosyn course (12/24-12/31)     / RENAL   # CKD   - CRE at baseline   - Monitor renal function and UOP     # Hypernatremia   -  Q4H added with improvement   - Monitor closely with diuresis as above     INFECTIOUS DISEASE   # COVID with superimposed PNA   # ESBL Kleb PNA   - COVID POSITIVE (12/23) and completed remdesivir x 1 dose and paxlovid course.   - WOB worsened as above requiring intubation and cultures negative. Zosyn completed empirically however hypothermic (2/11) and BCx, UA, RVP and BAL negative.   - Completed additional zosyn (2/12-2/19) empirically   - Fever spike and thick secretions and SCX (2/26) with ESBL klebs.   - Started on Zosyn (2/27 - 2/29) but resistant and changed to Erta (2/29 - )     HEME  # AOCD   - Monitor HH   - DVT PPX with HSQ     ENDOCRINE   # DM2 A1C 9.3   - Continue on lantus 12 and ISS     SKIN/ TUBES   - Trach (2/13)   - PEG (2/20)   - PERC LYNSEY (1/26 - )     ETHICS   - FULL CODE     DISPO - vent facility and family aware. SW aware to send out to facilities and family to discuss on DISPO plan.

## 2024-03-01 NOTE — PROGRESS NOTE ADULT - SUBJECTIVE AND OBJECTIVE BOX
CHIEF COMPLAINT: Patient is a 89y old  Male who presents with a chief complaint of COVID19, Chest pain (29 Feb 2024 13:26)      INTERVAL EVENTS:  - No interval events overnight     REVIEW OF SYSTEMS: Seen by bedside during AM rounds and unable to assess ROS as vented/ language barrier     Mode: CPAP with PS, TV (machine): 260, FiO2: 28, PEEP: 5, PS: 15, MAP: 10, PIP: 21      OBJECTIVE:  ICU Vital Signs Last 24 Hrs  T(C): 36.4 (01 Mar 2024 04:00), Max: 37.1 (29 Feb 2024 13:00)  T(F): 97.6 (01 Mar 2024 04:00), Max: 98.7 (29 Feb 2024 13:00)  HR: 85 (01 Mar 2024 08:19) (80 - 90)  BP: 126/67 (01 Mar 2024 04:00) (117/62 - 153/90)  BP(mean): --  ABP: --  ABP(mean): --  RR: 19 (01 Mar 2024 04:00) (18 - 19)  SpO2: 97% (01 Mar 2024 08:19) (96% - 100%)    O2 Parameters below as of 01 Mar 2024 08:19  Patient On (Oxygen Delivery Method): ventilator          Mode: CPAP with PS, TV (machine): 260, FiO2: 28, PEEP: 5, PS: 15, MAP: 10, PIP: 21    02-29 @ 07:01  -  03-01 @ 07:00  --------------------------------------------------------  IN: 2800 mL / OUT: 1632 mL / NET: 1168 mL      CAPILLARY BLOOD GLUCOSE  POCT Blood Glucose.: 161 mg/dL (01 Mar 2024 05:21)      HOSPITAL MEDICATIONS:  MEDICATIONS  (STANDING):  albuterol/ipratropium for Nebulization 3 milliLiter(s) Nebulizer every 12 hours  artificial  tears Solution 1 Drop(s) Left EYE four times a day  aspirin  chewable 81 milliGRAM(s) Enteral Tube daily  carvedilol 6.25 milliGRAM(s) Oral every 12 hours  chlorhexidine 0.12% Liquid 15 milliLiter(s) Oral Mucosa every 12 hours  chlorhexidine 2% Cloths 1 Application(s) Topical daily  dextrose 5%. 1000 milliLiter(s) (50 mL/Hr) IV Continuous <Continuous>  dextrose 5%. 1000 milliLiter(s) (100 mL/Hr) IV Continuous <Continuous>  dextrose 50% Injectable 25 Gram(s) IV Push once  dextrose 50% Injectable 25 Gram(s) IV Push once  dextrose 50% Injectable 12.5 Gram(s) IV Push once  doxazosin 2 milliGRAM(s) Oral at bedtime  ertapenem  IVPB 1000 milliGRAM(s) IV Intermittent every 24 hours  escitalopram Solution 10 milliGRAM(s) Oral daily  glucagon  Injectable 1 milliGRAM(s) IntraMuscular once  heparin   Injectable 5000 Unit(s) SubCutaneous every 8 hours  hydrALAZINE 10 milliGRAM(s) Oral every 8 hours  insulin glargine Injectable (LANTUS) 12 Unit(s) SubCutaneous <User Schedule>  insulin lispro (ADMELOG) corrective regimen sliding scale   SubCutaneous every 6 hours  isosorbide   dinitrate Tablet (ISORDIL) 5 milliGRAM(s) Oral three times a day  levETIRAcetam  Solution 500 milliGRAM(s) Oral two times a day  melatonin 6 milliGRAM(s) Oral at bedtime  pantoprazole   Suspension 40 milliGRAM(s) Oral every 12 hours  petrolatum Ophthalmic Ointment 1 Application(s) Left EYE at bedtime  QUEtiapine 12.5 milliGRAM(s) Oral <User Schedule>  sucralfate suspension 1 Gram(s) Enteral Tube two times a day  ticagrelor 60 milliGRAM(s) Oral every 12 hours    MEDICATIONS  (PRN):  acetaminophen   Oral Liquid .. 650 milliGRAM(s) Oral every 6 hours PRN Temp greater or equal to 38C (100.4F), Mild Pain (1 - 3), Moderate Pain (4 - 6)  dextrose Oral Gel 15 Gram(s) Oral once PRN Blood Glucose LESS THAN 70 milliGRAM(s)/deciliter      PHYSICAL EXAMINATION  General: NAD   HEENT: Trach present   Cards: S1/S2, no murmurs   Pulm: Course vent sounds bilaterally with diminished bases. No wheezes.   Abdomen: Soft, nondistended and nontender. BS (+) PEG present.   Extremities: Mild pedal edema bilaterally. GEORGE of BL upper > lower extremities when spoken to in native language.  Neurology: Awake with eyes open and intermittently follows commands when spoken to in native language but limited by weakness with no acute focal neurological deficits    LABS:                        8.5    6.90  )-----------( 410      ( 01 Mar 2024 05:15 )             27.5     03-01    138  |  98  |  37<H>  ----------------------------<  159<H>  4.0   |  30  |  1.39<H>    Ca    8.4      01 Mar 2024 05:15  Phos  3.5     03-01  Mg     2.20     03-01    TPro  6.8  /  Alb  2.5<L>  /  TBili  0.3  /  DBili  <0.2  /  AST  37  /  ALT  23  /  AlkPhos  166<H>  03-01      Urinalysis Basic - ( 01 Mar 2024 05:15 )  Color: x / Appearance: x / SG: x / pH: x  Gluc: 159 mg/dL / Ketone: x  / Bili: x / Urobili: x   Blood: x / Protein: x / Nitrite: x   Leuk Esterase: x / RBC: x / WBC x   Sq Epi: x / Non Sq Epi: x / Bacteria: x            PAST MEDICAL & SURGICAL HISTORY:  Hyperlipemia      Hypertension      Coronary Artery Disease      Diabetes Mellitus Type II      Stented Coronary Artery  total 5 stents, last stent 5/2019      Neuropathy      Myocardial infarction      Stroke  mild left facial numbness   no other residuals verbalized      Myoclonic jerking      Stage 3 chronic kidney disease      History of Cataract Extraction      Hx of CABG      H/O coronary angiogram      S/P coronary artery stent placement  1/6/09      S/P placement of cardiac pacemaker          FAMILY HISTORY:  No pertinent family history in first degree relatives        Social History:  Lives with family  Ambulates with walker  Denies tobacco, EtOH, or illicit substance use (23 Dec 2023 22:51)      RADIOLOGY:  [ ] Reviewed and interpreted by me    PULMONARY FUNCTION TESTS:    EKG: CHIEF COMPLAINT: Patient is a 89y old  Male who presents with a chief complaint of COVID19, Chest pain (29 Feb 2024 13:26)      INTERVAL EVENTS:  - No interval events overnight     REVIEW OF SYSTEMS: Seen by bedside during AM rounds and unable to assess ROS as vented/ language barrier     Mode: CPAP with PS, TV (machine): 260, FiO2: 28, PEEP: 5, PS: 15, MAP: 10, PIP: 21      OBJECTIVE:  ICU Vital Signs Last 24 Hrs  T(C): 36.4 (01 Mar 2024 04:00), Max: 37.1 (29 Feb 2024 13:00)  T(F): 97.6 (01 Mar 2024 04:00), Max: 98.7 (29 Feb 2024 13:00)  HR: 85 (01 Mar 2024 08:19) (80 - 90)  BP: 126/67 (01 Mar 2024 04:00) (117/62 - 153/90)  BP(mean): --  ABP: --  ABP(mean): --  RR: 19 (01 Mar 2024 04:00) (18 - 19)  SpO2: 97% (01 Mar 2024 08:19) (96% - 100%)    O2 Parameters below as of 01 Mar 2024 08:19  Patient On (Oxygen Delivery Method): ventilator          Mode: CPAP with PS, TV (machine): 260, FiO2: 28, PEEP: 5, PS: 15, MAP: 10, PIP: 21    02-29 @ 07:01  -  03-01 @ 07:00  --------------------------------------------------------  IN: 2800 mL / OUT: 1632 mL / NET: 1168 mL      CAPILLARY BLOOD GLUCOSE  POCT Blood Glucose.: 161 mg/dL (01 Mar 2024 05:21)      HOSPITAL MEDICATIONS:  MEDICATIONS  (STANDING):  albuterol/ipratropium for Nebulization 3 milliLiter(s) Nebulizer every 12 hours  artificial  tears Solution 1 Drop(s) Left EYE four times a day  aspirin  chewable 81 milliGRAM(s) Enteral Tube daily  carvedilol 6.25 milliGRAM(s) Oral every 12 hours  chlorhexidine 0.12% Liquid 15 milliLiter(s) Oral Mucosa every 12 hours  chlorhexidine 2% Cloths 1 Application(s) Topical daily  dextrose 5%. 1000 milliLiter(s) (50 mL/Hr) IV Continuous <Continuous>  dextrose 5%. 1000 milliLiter(s) (100 mL/Hr) IV Continuous <Continuous>  dextrose 50% Injectable 25 Gram(s) IV Push once  dextrose 50% Injectable 25 Gram(s) IV Push once  dextrose 50% Injectable 12.5 Gram(s) IV Push once  doxazosin 2 milliGRAM(s) Oral at bedtime  ertapenem  IVPB 1000 milliGRAM(s) IV Intermittent every 24 hours  escitalopram Solution 10 milliGRAM(s) Oral daily  glucagon  Injectable 1 milliGRAM(s) IntraMuscular once  heparin   Injectable 5000 Unit(s) SubCutaneous every 8 hours  hydrALAZINE 10 milliGRAM(s) Oral every 8 hours  insulin glargine Injectable (LANTUS) 12 Unit(s) SubCutaneous <User Schedule>  insulin lispro (ADMELOG) corrective regimen sliding scale   SubCutaneous every 6 hours  isosorbide   dinitrate Tablet (ISORDIL) 5 milliGRAM(s) Oral three times a day  levETIRAcetam  Solution 500 milliGRAM(s) Oral two times a day  melatonin 6 milliGRAM(s) Oral at bedtime  pantoprazole   Suspension 40 milliGRAM(s) Oral every 12 hours  petrolatum Ophthalmic Ointment 1 Application(s) Left EYE at bedtime  QUEtiapine 12.5 milliGRAM(s) Oral <User Schedule>  sucralfate suspension 1 Gram(s) Enteral Tube two times a day  ticagrelor 60 milliGRAM(s) Oral every 12 hours    MEDICATIONS  (PRN):  acetaminophen   Oral Liquid .. 650 milliGRAM(s) Oral every 6 hours PRN Temp greater or equal to 38C (100.4F), Mild Pain (1 - 3), Moderate Pain (4 - 6)  dextrose Oral Gel 15 Gram(s) Oral once PRN Blood Glucose LESS THAN 70 milliGRAM(s)/deciliter      PHYSICAL EXAMINATION  General: NAD   HEENT: Trach present   Cards: S1/S2, no murmurs   Pulm: Course vent sounds bilaterally with diminished bases. No wheezes.   Abdomen: Soft, nondistended and nontender. BS (+) PEG present.   Extremities: Mild pedal edema bilaterally however improving. GEORGE of BL upper > lower extremities when spoken to in native language.  Neurology: Awake with eyes open and intermittently follows commands when spoken to in native language but limited by weakness with no acute focal neurological deficits    LABS:                        8.5    6.90  )-----------( 410      ( 01 Mar 2024 05:15 )             27.5     03-01    138  |  98  |  37<H>  ----------------------------<  159<H>  4.0   |  30  |  1.39<H>    Ca    8.4      01 Mar 2024 05:15  Phos  3.5     03-01  Mg     2.20     03-01    TPro  6.8  /  Alb  2.5<L>  /  TBili  0.3  /  DBili  <0.2  /  AST  37  /  ALT  23  /  AlkPhos  166<H>  03-01      Urinalysis Basic - ( 01 Mar 2024 05:15 )  Color: x / Appearance: x / SG: x / pH: x  Gluc: 159 mg/dL / Ketone: x  / Bili: x / Urobili: x   Blood: x / Protein: x / Nitrite: x   Leuk Esterase: x / RBC: x / WBC x   Sq Epi: x / Non Sq Epi: x / Bacteria: x            PAST MEDICAL & SURGICAL HISTORY:  Hyperlipemia      Hypertension      Coronary Artery Disease      Diabetes Mellitus Type II      Stented Coronary Artery  total 5 stents, last stent 5/2019      Neuropathy      Myocardial infarction      Stroke  mild left facial numbness   no other residuals verbalized      Myoclonic jerking      Stage 3 chronic kidney disease      History of Cataract Extraction      Hx of CABG      H/O coronary angiogram      S/P coronary artery stent placement  1/6/09      S/P placement of cardiac pacemaker          FAMILY HISTORY:  No pertinent family history in first degree relatives        Social History:  Lives with family  Ambulates with walker  Denies tobacco, EtOH, or illicit substance use (23 Dec 2023 22:51)      RADIOLOGY:  [ ] Reviewed and interpreted by me    PULMONARY FUNCTION TESTS:    EKG:

## 2024-03-01 NOTE — PROGRESS NOTE ADULT - NS ATTEND AMEND GEN_ALL_CORE FT
agree with above  responding to abx  wean - gia schaefer at [facility]  d/c planning

## 2024-03-01 NOTE — CHART NOTE - NSCHARTNOTEFT_GEN_A_CORE
Type of restraint: Bilateral unsecured mittens    Behavioral criteria for discontinuation of restraint: See order    Called to evaluate patient for restraints    Other interventions attempted: de-escalation, orientation check, environment modification, and medication assessment    I evaluated this patient and have determined that restraints are warranted to optimize medical care. Patient was assessed for current physical and psychological risk factors as well as special needs. There are no medical conditions or limitations that would place this patient at risk while in restraints.    Patient's history, medications, lab results, environmental factors reviewed. After review and consideration, order for restraints placed. Type of restraint: Bilateral unsecured [facility]    Behavioral criteria for discontinuation of restraint: See order    Called to evaluate patient for restraints. As per RN, patient is restless, and tries to pull on tracheostomy and PEG tube. Assessed the patient at bedside. Patient is alert, minimally restless, tries to reach up to the tubes,  but  not in any acute distress or SOB. No hypoxia noted .     Other interventions attempted: de-escalation, orientation check, environment modification, and medication assessment    I evaluated this patient and have determined that restraints are warranted to optimize medical care. Patient was assessed for current physical and psychological risk factors as well as special needs. There are no medical conditions or limitations that would place this patient at risk while in restraints.    Patient's history, medications, lab results, environmental factors reviewed. After review and consideration, order for restraints placed. [facility]    Behavioral criteria for discontinuation of restraint: See order    Called to evaluate patient for restraints. As per RN, patient is restless, and tries to pull on tracheostomy and PEG tube. Assessed the patient at bedside. Patient is alert, minimally restless, tries to reach up to the tubes,  but  not in any acute distress, tachypneic  or SOB. No hypoxia noted .     Other interventions attempted: de-escalation, orientation check, environment modification, and medication assessment    I evaluated this patient and have determined that restraints are warranted to optimize medical care. Patient was assessed for current physical and psychological risk factors as well as special needs. There are no medical conditions or limitations that would place this patient at risk while in restraints.    Patient's history, medications, lab results, environmental factors reviewed. After review and consideration, order for restraints placed. Type of restraint: Bilateral unsecured mittens    Behavioral criteria for discontinuation of restraint: See order    Called to evaluate patient for restraints. As per RN, patient is restless, and tries to pull on tracheostomy and PEG tube. Assessed the patient at bedside. Patient is alert, minimally restless, tries to reach up to the tubes,  but  not in any acute distress, tachypneic  or SOB. No hypoxia noted .     Other interventions attempted: de-escalation, orientation check, environment modification, and medication assessment    I evaluated this patient and have determined that restraints are warranted to optimize medical care. Patient was assessed for current physical and psychological risk factors as well as special needs. There are no medical conditions or limitations that would place this patient at risk while in restraints.    Patient's history, medications, lab results, environmental factors reviewed. After review and consideration, order for restraints placed.    ADDENDUM:   Reassessed the patient around 0100. Patient is still restless with mittens on and requesting to remove the mittens. Family member at bedside, expresses that he will be able to redirect the patient and will closely watch him. Mittens discontinued. Updated to the primary RN, to continue monitor and call for any changes or for escalation back to restraints if needed.     Addendum:  Reassessed the patient @0430.    Patient is comparatively calm, with family member at bedside redirecting and communicating with him. Not in any acute distress. Will monitor off restraints at this time. May consider increasing the Seroquel dose for tonight.

## 2024-03-01 NOTE — PROGRESS NOTE ADULT - SUBJECTIVE AND OBJECTIVE BOX
NEPHROLOGY-[facility] [place] [phone]        Patient seen and examined trach to vent.   no distress . son at bedside    ROS :UTO      MEDICATIONS  (STANDING):  albuterol/ipratropium for Nebulization 3 milliLiter(s) Nebulizer every 12 hours  artificial  tears Solution 1 Drop(s) Left EYE four times a day  aspirin  chewable 81 milliGRAM(s) Enteral Tube daily  carvedilol 6.25 milliGRAM(s) Oral every 12 hours  chlorhexidine 0.12% Liquid 15 milliLiter(s) Oral Mucosa every 12 hours  chlorhexidine 2% Cloths 1 Application(s) Topical daily  dextrose 5%. 1000 milliLiter(s) (100 mL/Hr) IV Continuous <Continuous>  dextrose 5%. 1000 milliLiter(s) (50 mL/Hr) IV Continuous <Continuous>  dextrose 50% Injectable 25 Gram(s) IV Push once  dextrose 50% Injectable 12.5 Gram(s) IV Push once  dextrose 50% Injectable 25 Gram(s) IV Push once  doxazosin 2 milliGRAM(s) Oral at bedtime  escitalopram Solution 10 milliGRAM(s) Oral daily  glucagon  Injectable 1 milliGRAM(s) IntraMuscular once  heparin   Injectable 5000 Unit(s) SubCutaneous every 8 hours  hydrALAZINE 10 milliGRAM(s) Oral every 8 hours  insulin glargine Injectable (LANTUS) 12 Unit(s) SubCutaneous <User Schedule>  insulin lispro (ADMELOG) corrective regimen sliding scale   SubCutaneous every 6 hours  levETIRAcetam  Solution 500 milliGRAM(s) Oral two times a day  melatonin 6 milliGRAM(s) Oral at bedtime  pantoprazole   Suspension 40 milliGRAM(s) Oral every 12 hours  petrolatum Ophthalmic Ointment 1 Application(s) Left EYE at bedtime  sucralfate suspension 1 Gram(s) Enteral Tube two times a day  ticagrelor 60 milliGRAM(s) Oral every 12 hours      VITAL:  T(C): , Max: 36.9 (02-26-24 @ 12:00)  T(F): , Max: 98.4 (02-26-24 @ 12:00)  HR: 93 (02-26-24 @ 12:00)  BP: 137/88 (02-26-24 @ 12:00)  BP(mean): --  RR: 19 (02-26-24 @ 12:00)  SpO2: 99% (02-26-24 @ 12:00)  Wt(kg): --    I and O's:    02-25 @ 07:01  -  02-26 @ 07:00  --------------------------------------------------------  IN: 2140 mL / OUT: 2150 mL / NET: -10 mL        PHYSICAL EXAM:  Constitutional: NAD trach to vent   HEENT: +trach  Respiratory: coarse bs   Cardiovascular: normal s1s2  Gastrointestinal: BS+, soft, NT/ND +PEG  Extremities:+ peripheral edema  :  + external catheter   Skin: No rashes      LABS:                        8.7    8.17  )-----------( 402      ( 26 Feb 2024 05:40 )             29.0     02-26    147<H>  |  107  |  34<H>  ----------------------------<  175<H>  3.5   |  30  |  1.40<H>    Ca    8.8      26 Feb 2024 05:40  Phos  2.7     02-26  Mg     2.00     02-26            Urine Studies:  Urinalysis Basic - ( 26 Feb 2024 05:40 )    Color: x / Appearance: x / SG: x / pH: x  Gluc: 175 mg/dL / Ketone: x  / Bili: x / Urobili: x   Blood: x / Protein: x / Nitrite: x   Leuk Esterase: x / RBC: x / WBC x   Sq Epi: x / Non Sq Epi: x / Bacteria: x      IMPRESSION:  89M w/ DM2, HTN, CVA, PAD, CKD3, and CAD-CABG, 12/23/23 p/w COVID19 infection, c/b respiratory failure/hypotension; now s/p tracheostomy    (1)Renal - CKD3; superimposed mild prerenal azotemia - numbers fluctuating based on hemodynamic status  (2)Hypokalemia - improved s/p repletion   (3)CV - now off pressors and midodrine, BP improved/stable    (4)Pulm - vent-dependent   (5ID - afebrile, s/p zosyn   (6)GI - s/p PEG (2/20)  (7)Hypernatremia - possibly due to diuresis, on free water    RECOMMEND:   (1)cont lasix prn for hypervolemia   (2)decrease  Free water 200 cc q6h   (3)Continue meds for GFR 30-40ml/min      [facility]  Office: [phone]   NEPHROLOGY-[facility] [phone]        Patient seen and examined trach to vent.   no distress .   family bedside     ROS :UTO      MEDICATIONS  (STANDING):  albuterol/ipratropium for Nebulization 3 milliLiter(s) Nebulizer every 12 hours  artificial  tears Solution 1 Drop(s) Left EYE four times a day  aspirin  chewable 81 milliGRAM(s) Enteral Tube daily  carvedilol 6.25 milliGRAM(s) Oral every 12 hours  chlorhexidine 0.12% Liquid 15 milliLiter(s) Oral Mucosa every 12 hours  chlorhexidine 2% Cloths 1 Application(s) Topical daily  dextrose 5%. 1000 milliLiter(s) (100 mL/Hr) IV Continuous <Continuous>  dextrose 5%. 1000 milliLiter(s) (50 mL/Hr) IV Continuous <Continuous>  dextrose 50% Injectable 25 Gram(s) IV Push once  dextrose 50% Injectable 12.5 Gram(s) IV Push once  dextrose 50% Injectable 25 Gram(s) IV Push once  doxazosin 2 milliGRAM(s) Oral at bedtime  escitalopram Solution 10 milliGRAM(s) Oral daily  glucagon  Injectable 1 milliGRAM(s) IntraMuscular once  heparin   Injectable 5000 Unit(s) SubCutaneous every 8 hours  hydrALAZINE 10 milliGRAM(s) Oral every 8 hours  insulin glargine Injectable (LANTUS) 12 Unit(s) SubCutaneous <User Schedule>  insulin lispro (ADMELOG) corrective regimen sliding scale   SubCutaneous every 6 hours  levETIRAcetam  Solution 500 milliGRAM(s) Oral two times a day  melatonin 6 milliGRAM(s) Oral at bedtime  pantoprazole   Suspension 40 milliGRAM(s) Oral every 12 hours  petrolatum Ophthalmic Ointment 1 Application(s) Left EYE at bedtime  sucralfate suspension 1 Gram(s) Enteral Tube two times a day  ticagrelor 60 milliGRAM(s) Oral every 12 hours      VITAL:  T(C): , Max: 36.9 (02-26-24 @ 12:00)  T(F): , Max: 98.4 (02-26-24 @ 12:00)  HR: 93 (02-26-24 @ 12:00)  BP: 137/88 (02-26-24 @ 12:00)  BP(mean): --  RR: 19 (02-26-24 @ 12:00)  SpO2: 99% (02-26-24 @ 12:00)  Wt(kg): --    I and O's:    02-25 @ 07:01  -  02-26 @ 07:00  --------------------------------------------------------  IN: 2140 mL / OUT: 2150 mL / NET: -10 mL        PHYSICAL EXAM:  Constitutional: NAD trach to vent   HEENT: +trach  Respiratory: coarse bs   Cardiovascular: normal s1s2  Gastrointestinal: BS+, soft, NT/ND +PEG  Extremities:+ peripheral edema  :  + external catheter   Skin: No rashes      LABS:                        8.7    8.17  )-----------( 402      ( 26 Feb 2024 05:40 )             29.0     02-26    147<H>  |  107  |  34<H>  ----------------------------<  175<H>  3.5   |  30  |  1.40<H>    Ca    8.8      26 Feb 2024 05:40  Phos  2.7     02-26  Mg     2.00     02-26            Urine Studies:  Urinalysis Basic - ( 26 Feb 2024 05:40 )    Color: x / Appearance: x / SG: x / pH: x  Gluc: 175 mg/dL / Ketone: x  / Bili: x / Urobili: x   Blood: x / Protein: x / Nitrite: x   Leuk Esterase: x / RBC: x / WBC x   Sq Epi: x / Non Sq Epi: x / Bacteria: x      IMPRESSION:  89M w/ DM2, HTN, CVA, PAD, CKD3, and CAD-CABG, 12/23/23 p/w COVID19 infection, c/b respiratory failure/hypotension; now s/p tracheostomy    (1)Renal - CKD3; superimposed mild prerenal azotemia - numbers fluctuating based on hemodynamic status-- but stable   (2)Hypokalemia - improved s/p repletion   (3)CV - now off pressors and midodrine, BP improved/stable    (4)Pulm - vent-dependent via trach   (5ID - afebrile, s/p zosyn   (6)GI - s/p PEG (2/20)  (7)Hypernatremia - possibly due to diuresis, on free water    RECOMMEND:   (1)cont lasix prn for hypervolemia ( last given yesterday )  (2)decrease  Free water 250 cc q6h   (3)Continue meds for GFR 30-40ml/min      [facility]  [facility]  Office: [phone]

## 2024-03-02 LAB
ANION GAP SERPL CALC-SCNC: 13 MMOL/L — SIGNIFICANT CHANGE UP (ref 7–14)
BASOPHILS # BLD AUTO: 0.07 K/UL — SIGNIFICANT CHANGE UP (ref 0–0.2)
BASOPHILS NFR BLD AUTO: 0.9 % — SIGNIFICANT CHANGE UP (ref 0–2)
BUN SERPL-MCNC: 37 MG/DL — HIGH (ref 7–23)
CALCIUM SERPL-MCNC: 8.7 MG/DL — SIGNIFICANT CHANGE UP (ref 8.4–10.5)
CHLORIDE SERPL-SCNC: 94 MMOL/L — LOW (ref 98–107)
CO2 SERPL-SCNC: 28 MMOL/L — SIGNIFICANT CHANGE UP (ref 22–31)
CREAT SERPL-MCNC: 1.33 MG/DL — HIGH (ref 0.5–1.3)
EGFR: 51 ML/MIN/1.73M2 — LOW
EOSINOPHIL # BLD AUTO: 0.76 K/UL — HIGH (ref 0–0.5)
EOSINOPHIL NFR BLD AUTO: 10.1 % — HIGH (ref 0–6)
GLUCOSE BLDC GLUCOMTR-MCNC: 180 MG/DL — HIGH (ref 70–99)
GLUCOSE BLDC GLUCOMTR-MCNC: 182 MG/DL — HIGH (ref 70–99)
GLUCOSE BLDC GLUCOMTR-MCNC: 185 MG/DL — HIGH (ref 70–99)
GLUCOSE BLDC GLUCOMTR-MCNC: 243 MG/DL — HIGH (ref 70–99)
GLUCOSE SERPL-MCNC: 187 MG/DL — HIGH (ref 70–99)
HCT VFR BLD CALC: 26 % — LOW (ref 39–50)
HGB BLD-MCNC: 8.4 G/DL — LOW (ref 13–17)
IANC: 3.84 K/UL — SIGNIFICANT CHANGE UP (ref 1.8–7.4)
IMM GRANULOCYTES NFR BLD AUTO: 0.4 % — SIGNIFICANT CHANGE UP (ref 0–0.9)
LYMPHOCYTES # BLD AUTO: 2.08 K/UL — SIGNIFICANT CHANGE UP (ref 1–3.3)
LYMPHOCYTES # BLD AUTO: 27.6 % — SIGNIFICANT CHANGE UP (ref 13–44)
MAGNESIUM SERPL-MCNC: 2.2 MG/DL — SIGNIFICANT CHANGE UP (ref 1.6–2.6)
MCHC RBC-ENTMCNC: 29.2 PG — SIGNIFICANT CHANGE UP (ref 27–34)
MCHC RBC-ENTMCNC: 32.3 GM/DL — SIGNIFICANT CHANGE UP (ref 32–36)
MCV RBC AUTO: 90.3 FL — SIGNIFICANT CHANGE UP (ref 80–100)
MONOCYTES # BLD AUTO: 0.75 K/UL — SIGNIFICANT CHANGE UP (ref 0–0.9)
MONOCYTES NFR BLD AUTO: 10 % — SIGNIFICANT CHANGE UP (ref 2–14)
NEUTROPHILS # BLD AUTO: 3.84 K/UL — SIGNIFICANT CHANGE UP (ref 1.8–7.4)
NEUTROPHILS NFR BLD AUTO: 51 % — SIGNIFICANT CHANGE UP (ref 43–77)
NRBC # BLD: 0 /100 WBCS — SIGNIFICANT CHANGE UP (ref 0–0)
NRBC # FLD: 0 K/UL — SIGNIFICANT CHANGE UP (ref 0–0)
PHOSPHATE SERPL-MCNC: 3.1 MG/DL — SIGNIFICANT CHANGE UP (ref 2.5–4.5)
PLATELET # BLD AUTO: 433 K/UL — HIGH (ref 150–400)
POTASSIUM SERPL-MCNC: 4 MMOL/L — SIGNIFICANT CHANGE UP (ref 3.5–5.3)
POTASSIUM SERPL-SCNC: 4 MMOL/L — SIGNIFICANT CHANGE UP (ref 3.5–5.3)
RBC # BLD: 2.88 M/UL — LOW (ref 4.2–5.8)
RBC # FLD: 15.9 % — HIGH (ref 10.3–14.5)
SODIUM SERPL-SCNC: 135 MMOL/L — SIGNIFICANT CHANGE UP (ref 135–145)
WBC # BLD: 7.53 K/UL — SIGNIFICANT CHANGE UP (ref 3.8–10.5)
WBC # FLD AUTO: 7.53 K/UL — SIGNIFICANT CHANGE UP (ref 3.8–10.5)

## 2024-03-02 PROCEDURE — 71045 X-RAY EXAM CHEST 1 VIEW: CPT | Mod: 26

## 2024-03-02 PROCEDURE — 93010 ELECTROCARDIOGRAM REPORT: CPT

## 2024-03-02 PROCEDURE — 99233 SBSQ HOSP IP/OBS HIGH 50: CPT | Mod: FS

## 2024-03-02 RX ORDER — QUETIAPINE FUMARATE 200 MG/1
25 TABLET, FILM COATED ORAL
Refills: 0 | Status: DISCONTINUED | OUTPATIENT
Start: 2024-03-02 | End: 2024-03-03

## 2024-03-02 RX ORDER — HYDRALAZINE HCL 50 MG
25 TABLET ORAL EVERY 8 HOURS
Refills: 0 | Status: DISCONTINUED | OUTPATIENT
Start: 2024-03-02 | End: 2024-03-04

## 2024-03-02 RX ADMIN — Medication 1 APPLICATION(S): at 21:17

## 2024-03-02 RX ADMIN — QUETIAPINE FUMARATE 25 MILLIGRAM(S): 200 TABLET, FILM COATED ORAL at 21:14

## 2024-03-02 RX ADMIN — Medication 1 GRAM(S): at 17:40

## 2024-03-02 RX ADMIN — HEPARIN SODIUM 5000 UNIT(S): 5000 INJECTION INTRAVENOUS; SUBCUTANEOUS at 21:12

## 2024-03-02 RX ADMIN — Medication 1 DROP(S): at 17:41

## 2024-03-02 RX ADMIN — Medication 25 MILLIGRAM(S): at 14:00

## 2024-03-02 RX ADMIN — Medication 81 MILLIGRAM(S): at 11:20

## 2024-03-02 RX ADMIN — ISOSORBIDE DINITRATE 5 MILLIGRAM(S): 5 TABLET ORAL at 05:14

## 2024-03-02 RX ADMIN — Medication 1 DROP(S): at 11:18

## 2024-03-02 RX ADMIN — Medication 2: at 00:00

## 2024-03-02 RX ADMIN — ESCITALOPRAM OXALATE 10 MILLIGRAM(S): 10 TABLET, FILM COATED ORAL at 11:14

## 2024-03-02 RX ADMIN — TICAGRELOR 60 MILLIGRAM(S): 90 TABLET ORAL at 05:13

## 2024-03-02 RX ADMIN — PANTOPRAZOLE SODIUM 40 MILLIGRAM(S): 20 TABLET, DELAYED RELEASE ORAL at 05:13

## 2024-03-02 RX ADMIN — Medication 6 MILLIGRAM(S): at 21:14

## 2024-03-02 RX ADMIN — Medication 10 MILLIGRAM(S): at 05:14

## 2024-03-02 RX ADMIN — CARVEDILOL PHOSPHATE 6.25 MILLIGRAM(S): 80 CAPSULE, EXTENDED RELEASE ORAL at 17:40

## 2024-03-02 RX ADMIN — Medication 2: at 17:41

## 2024-03-02 RX ADMIN — Medication 1 GRAM(S): at 05:12

## 2024-03-02 RX ADMIN — HEPARIN SODIUM 5000 UNIT(S): 5000 INJECTION INTRAVENOUS; SUBCUTANEOUS at 05:13

## 2024-03-02 RX ADMIN — CHLORHEXIDINE GLUCONATE 1 APPLICATION(S): 213 SOLUTION TOPICAL at 11:19

## 2024-03-02 RX ADMIN — SENNA PLUS 10 MILLILITER(S): 8.6 TABLET ORAL at 21:22

## 2024-03-02 RX ADMIN — Medication 3 MILLILITER(S): at 21:11

## 2024-03-02 RX ADMIN — CHLORHEXIDINE GLUCONATE 15 MILLILITER(S): 213 SOLUTION TOPICAL at 17:40

## 2024-03-02 RX ADMIN — CHLORHEXIDINE GLUCONATE 15 MILLILITER(S): 213 SOLUTION TOPICAL at 05:12

## 2024-03-02 RX ADMIN — Medication 25 MILLIGRAM(S): at 21:23

## 2024-03-02 RX ADMIN — LEVETIRACETAM 500 MILLIGRAM(S): 250 TABLET, FILM COATED ORAL at 05:12

## 2024-03-02 RX ADMIN — PANTOPRAZOLE SODIUM 40 MILLIGRAM(S): 20 TABLET, DELAYED RELEASE ORAL at 17:40

## 2024-03-02 RX ADMIN — Medication 1 DROP(S): at 05:14

## 2024-03-02 RX ADMIN — Medication 3 MILLILITER(S): at 07:20

## 2024-03-02 RX ADMIN — POLYETHYLENE GLYCOL 3350 17 GRAM(S): 17 POWDER, FOR SOLUTION ORAL at 11:20

## 2024-03-02 RX ADMIN — Medication 4: at 11:22

## 2024-03-02 RX ADMIN — Medication 2 MILLIGRAM(S): at 21:23

## 2024-03-02 RX ADMIN — TICAGRELOR 60 MILLIGRAM(S): 90 TABLET ORAL at 17:40

## 2024-03-02 RX ADMIN — HEPARIN SODIUM 5000 UNIT(S): 5000 INJECTION INTRAVENOUS; SUBCUTANEOUS at 13:58

## 2024-03-02 RX ADMIN — CARVEDILOL PHOSPHATE 6.25 MILLIGRAM(S): 80 CAPSULE, EXTENDED RELEASE ORAL at 05:13

## 2024-03-02 RX ADMIN — LEVETIRACETAM 500 MILLIGRAM(S): 250 TABLET, FILM COATED ORAL at 17:40

## 2024-03-02 RX ADMIN — Medication 2: at 05:13

## 2024-03-02 RX ADMIN — ERTAPENEM SODIUM 120 MILLIGRAM(S): 1 INJECTION, POWDER, LYOPHILIZED, FOR SOLUTION INTRAMUSCULAR; INTRAVENOUS at 13:57

## 2024-03-02 RX ADMIN — INSULIN GLARGINE 12 UNIT(S): 100 INJECTION, SOLUTION SUBCUTANEOUS at 11:22

## 2024-03-02 NOTE — PROGRESS NOTE ADULT - SUBJECTIVE AND OBJECTIVE BOX
CHIEF COMPLAINT: Patient is a 89y old  Male who presents with a chief complaint of COVID19, Chest pain (01 Mar 2024 09:19)      INTERVAL EVENTS:  - No interval events overnight     REVIEW OF SYSTEMS: Seen by bedside during AM rounds and unable to assess ROS as vented/ language barrier     Mode: CPAP with PS, FiO2: 28, PEEP: 5, PS: 15, MAP: 12, PIP: 21      OBJECTIVE:  ICU Vital Signs Last 24 Hrs  T(C): 36.4 (02 Mar 2024 08:51), Max: 36.8 (02 Mar 2024 04:00)  T(F): 97.6 (02 Mar 2024 08:51), Max: 98.2 (02 Mar 2024 04:00)  HR: 96 (02 Mar 2024 08:51) (78 - 99)  BP: 135/75 (02 Mar 2024 08:51) (112/57 - 143/69)  BP(mean): 92 (02 Mar 2024 08:51) (74 - 92)  ABP: --  ABP(mean): --  RR: 30 (02 Mar 2024 08:51) (12 - 30)  SpO2: 99% (02 Mar 2024 08:51) (94% - 100%)    O2 Parameters below as of 02 Mar 2024 08:51  Patient On (Oxygen Delivery Method): ventilator, CPAP  O2 Flow (L/min): 40        Mode: CPAP with PS, FiO2: 28, PEEP: 5, PS: 15, MAP: 12, PIP: 21    03-01 @ 07:01  -  03-02 @ 07:00  --------------------------------------------------------  IN: 1800 mL / OUT: 1800 mL / NET: 0 mL      CAPILLARY BLOOD GLUCOSE  POCT Blood Glucose.: 185 mg/dL (02 Mar 2024 05:10)      HOSPITAL MEDICATIONS:  MEDICATIONS  (STANDING):  albuterol/ipratropium for Nebulization 3 milliLiter(s) Nebulizer every 12 hours  artificial  tears Solution 1 Drop(s) Left EYE four times a day  aspirin  chewable 81 milliGRAM(s) Enteral Tube daily  carvedilol 6.25 milliGRAM(s) Oral every 12 hours  chlorhexidine 0.12% Liquid 15 milliLiter(s) Oral Mucosa every 12 hours  chlorhexidine 2% Cloths 1 Application(s) Topical daily  dextrose 5%. 1000 milliLiter(s) (50 mL/Hr) IV Continuous <Continuous>  dextrose 5%. 1000 milliLiter(s) (100 mL/Hr) IV Continuous <Continuous>  dextrose 50% Injectable 25 Gram(s) IV Push once  dextrose 50% Injectable 25 Gram(s) IV Push once  dextrose 50% Injectable 12.5 Gram(s) IV Push once  doxazosin 2 milliGRAM(s) Oral at bedtime  ertapenem  IVPB 1000 milliGRAM(s) IV Intermittent every 24 hours  escitalopram Solution 10 milliGRAM(s) Oral daily  glucagon  Injectable 1 milliGRAM(s) IntraMuscular once  heparin   Injectable 5000 Unit(s) SubCutaneous every 8 hours  hydrALAZINE 25 milliGRAM(s) Oral every 8 hours  insulin glargine Injectable (LANTUS) 12 Unit(s) SubCutaneous <User Schedule>  insulin lispro (ADMELOG) corrective regimen sliding scale   SubCutaneous every 6 hours  levETIRAcetam  Solution 500 milliGRAM(s) Oral two times a day  melatonin 6 milliGRAM(s) Oral at bedtime  pantoprazole   Suspension 40 milliGRAM(s) Oral every 12 hours  petrolatum Ophthalmic Ointment 1 Application(s) Left EYE at bedtime  polyethylene glycol 3350 17 Gram(s) Oral daily  QUEtiapine 25 milliGRAM(s) Oral <User Schedule>  senna Syrup 10 milliLiter(s) Oral at bedtime  sucralfate suspension 1 Gram(s) Enteral Tube two times a day  ticagrelor 60 milliGRAM(s) Oral every 12 hours    MEDICATIONS  (PRN):  acetaminophen   Oral Liquid .. 650 milliGRAM(s) Oral every 6 hours PRN Temp greater or equal to 38C (100.4F), Mild Pain (1 - 3), Moderate Pain (4 - 6)  dextrose Oral Gel 15 Gram(s) Oral once PRN Blood Glucose LESS THAN 70 milliGRAM(s)/deciliter      PHYSICAL EXAMINATION  General: NAD   HEENT: Trach present   Cards: S1/S2, no murmurs   Pulm: Course vent sounds bilaterally with diminished bases. No wheezes.   Abdomen: Soft, nondistended and nontender. BS (+) PEG present.   Extremities: Mild pedal edema bilaterally however improving. GEORGE of BL upper > lower extremities when spoken to in native language.  Neurology: Awake with eyes open and intermittently follows commands when spoken to in native language but limited by weakness with no acute focal neurological deficits    LABS:                        8.4    7.53  )-----------( 433      ( 02 Mar 2024 05:45 )             26.0     03-02    135  |  94<L>  |  37<H>  ----------------------------<  187<H>  4.0   |  28  |  1.33<H>    Ca    8.7      02 Mar 2024 05:45  Phos  3.1     03-02  Mg     2.20     03-02    TPro  6.8  /  Alb  2.5<L>  /  TBili  0.3  /  DBili  <0.2  /  AST  37  /  ALT  23  /  AlkPhos  166<H>  03-01      Urinalysis Basic - ( 02 Mar 2024 05:45 )  Color: x / Appearance: x / SG: x / pH: x  Gluc: 187 mg/dL / Ketone: x  / Bili: x / Urobili: x   Blood: x / Protein: x / Nitrite: x   Leuk Esterase: x / RBC: x / WBC x   Sq Epi: x / Non Sq Epi: x / Bacteria: x            PAST MEDICAL & SURGICAL HISTORY:  Hyperlipemia      Hypertension      Coronary Artery Disease      Diabetes Mellitus Type II      Stented Coronary Artery  total 5 stents, last stent 5/2019      Neuropathy      Myocardial infarction      Stroke  mild left facial numbness   no other residuals verbalized      Myoclonic jerking      Stage 3 chronic kidney disease      History of Cataract Extraction      Hx of CABG      H/O coronary angiogram      S/P coronary artery stent placement  1/6/09      S/P placement of cardiac pacemaker          FAMILY HISTORY:  No pertinent family history in first degree relatives        Social History:  Lives with family  Ambulates with walker  Denies tobacco, EtOH, or illicit substance use (23 Dec 2023 22:51)      RADIOLOGY:  [ ] Reviewed and interpreted by me    PULMONARY FUNCTION TESTS:    EKG: CHIEF COMPLAINT: Patient is a 89y old  Male who presents with a chief complaint of COVID19, Chest pain (01 Mar 2024 09:19)      INTERVAL EVENTS:  - Insomnia and agitation overnight and pulling on tubes. Seroquel increased to 25 QHS (QTC corrected 490ms)   - Hold isordil and uptitrate hydralazine   - Pending RPT CXR and ECHO to assess volume status/ LVSF     REVIEW OF SYSTEMS: Seen by bedside during AM rounds and unable to assess ROS as vented/ language barrier     Mode: CPAP with PS, FiO2: 28, PEEP: 5, PS: 15, MAP: 12, PIP: 21      OBJECTIVE:  ICU Vital Signs Last 24 Hrs  T(C): 36.4 (02 Mar 2024 08:51), Max: 36.8 (02 Mar 2024 04:00)  T(F): 97.6 (02 Mar 2024 08:51), Max: 98.2 (02 Mar 2024 04:00)  HR: 96 (02 Mar 2024 08:51) (78 - 99)  BP: 135/75 (02 Mar 2024 08:51) (112/57 - 143/69)  BP(mean): 92 (02 Mar 2024 08:51) (74 - 92)  ABP: --  ABP(mean): --  RR: 30 (02 Mar 2024 08:51) (12 - 30)  SpO2: 99% (02 Mar 2024 08:51) (94% - 100%)    O2 Parameters below as of 02 Mar 2024 08:51  Patient On (Oxygen Delivery Method): ventilator, CPAP  O2 Flow (L/min): 40        Mode: CPAP with PS, FiO2: 28, PEEP: 5, PS: 15, MAP: 12, PIP: 21    03-01 @ 07:01  -  03-02 @ 07:00  --------------------------------------------------------  IN: 1800 mL / OUT: 1800 mL / NET: 0 mL      CAPILLARY BLOOD GLUCOSE  POCT Blood Glucose.: 185 mg/dL (02 Mar 2024 05:10)      HOSPITAL MEDICATIONS:  MEDICATIONS  (STANDING):  albuterol/ipratropium for Nebulization 3 milliLiter(s) Nebulizer every 12 hours  artificial  tears Solution 1 Drop(s) Left EYE four times a day  aspirin  chewable 81 milliGRAM(s) Enteral Tube daily  carvedilol 6.25 milliGRAM(s) Oral every 12 hours  chlorhexidine 0.12% Liquid 15 milliLiter(s) Oral Mucosa every 12 hours  chlorhexidine 2% Cloths 1 Application(s) Topical daily  dextrose 5%. 1000 milliLiter(s) (50 mL/Hr) IV Continuous <Continuous>  dextrose 5%. 1000 milliLiter(s) (100 mL/Hr) IV Continuous <Continuous>  dextrose 50% Injectable 25 Gram(s) IV Push once  dextrose 50% Injectable 25 Gram(s) IV Push once  dextrose 50% Injectable 12.5 Gram(s) IV Push once  doxazosin 2 milliGRAM(s) Oral at bedtime  ertapenem  IVPB 1000 milliGRAM(s) IV Intermittent every 24 hours  escitalopram Solution 10 milliGRAM(s) Oral daily  glucagon  Injectable 1 milliGRAM(s) IntraMuscular once  heparin   Injectable 5000 Unit(s) SubCutaneous every 8 hours  hydrALAZINE 25 milliGRAM(s) Oral every 8 hours  insulin glargine Injectable (LANTUS) 12 Unit(s) SubCutaneous <User Schedule>  insulin lispro (ADMELOG) corrective regimen sliding scale   SubCutaneous every 6 hours  levETIRAcetam  Solution 500 milliGRAM(s) Oral two times a day  melatonin 6 milliGRAM(s) Oral at bedtime  pantoprazole   Suspension 40 milliGRAM(s) Oral every 12 hours  petrolatum Ophthalmic Ointment 1 Application(s) Left EYE at bedtime  polyethylene glycol 3350 17 Gram(s) Oral daily  QUEtiapine 25 milliGRAM(s) Oral <User Schedule>  senna Syrup 10 milliLiter(s) Oral at bedtime  sucralfate suspension 1 Gram(s) Enteral Tube two times a day  ticagrelor 60 milliGRAM(s) Oral every 12 hours    MEDICATIONS  (PRN):  acetaminophen   Oral Liquid .. 650 milliGRAM(s) Oral every 6 hours PRN Temp greater or equal to 38C (100.4F), Mild Pain (1 - 3), Moderate Pain (4 - 6)  dextrose Oral Gel 15 Gram(s) Oral once PRN Blood Glucose LESS THAN 70 milliGRAM(s)/deciliter      PHYSICAL EXAMINATION  General: NAD   HEENT: Trach present   Cards: S1/S2, no murmurs   Pulm: Course vent sounds bilaterally with diminished bases (R>L). No wheezes.   Abdomen: Soft, nondistended and nontender. BS (+) PEG present.   Extremities: Mild pedal edema bilaterally however improving. GEORGE of BL upper > lower extremities when spoken to in native language.  Neurology: Awake with eyes open and intermittently follows commands when spoken to in native language but limited by weakness with no acute focal neurological deficits    LABS:                        8.4    7.53  )-----------( 433      ( 02 Mar 2024 05:45 )             26.0     03-02    135  |  94<L>  |  37<H>  ----------------------------<  187<H>  4.0   |  28  |  1.33<H>    Ca    8.7      02 Mar 2024 05:45  Phos  3.1     03-02  Mg     2.20     03-02    TPro  6.8  /  Alb  2.5<L>  /  TBili  0.3  /  DBili  <0.2  /  AST  37  /  ALT  23  /  AlkPhos  166<H>  03-01      Urinalysis Basic - ( 02 Mar 2024 05:45 )  Color: x / Appearance: x / SG: x / pH: x  Gluc: 187 mg/dL / Ketone: x  / Bili: x / Urobili: x   Blood: x / Protein: x / Nitrite: x   Leuk Esterase: x / RBC: x / WBC x   Sq Epi: x / Non Sq Epi: x / Bacteria: x            PAST MEDICAL & SURGICAL HISTORY:  Hyperlipemia      Hypertension      Coronary Artery Disease      Diabetes Mellitus Type II      Stented Coronary Artery  total 5 stents, last stent 5/2019      Neuropathy      Myocardial infarction      Stroke  mild left facial numbness   no other residuals verbalized      Myoclonic jerking      Stage 3 chronic kidney disease      History of Cataract Extraction      Hx of CABG      H/O coronary angiogram      S/P coronary artery stent placement  1/6/09      S/P placement of cardiac pacemaker          FAMILY HISTORY:  No pertinent family history in first degree relatives        Social History:  Lives with family  Ambulates with walker  Denies tobacco, EtOH, or illicit substance use (23 Dec 2023 22:51)      RADIOLOGY:  [ ] Reviewed and interpreted by me    PULMONARY FUNCTION TESTS:    EKG:

## 2024-03-02 NOTE — PROGRESS NOTE ADULT - SUBJECTIVE AND OBJECTIVE BOX
Aashish Boyer MD  Interventional Cardiology / Endovascular Specialist  [place] Office : [phone] [place] [id number] Tel:   [place] Office : [phone] [facility] [address] [address]  Tel: [phone]      Subjective/Overnight events: Patient lying in bed in RCU no acute distress    MEDICATIONS:  aspirin  chewable 81 milliGRAM(s) Enteral Tube daily  carvedilol 6.25 milliGRAM(s) Oral every 12 hours  doxazosin 2 milliGRAM(s) Oral at bedtime  heparin   Injectable 5000 Unit(s) SubCutaneous every 8 hours  hydrALAZINE 25 milliGRAM(s) Oral every 8 hours  ticagrelor 60 milliGRAM(s) Oral every 12 hours    ertapenem  IVPB 1000 milliGRAM(s) IV Intermittent every 24 hours    albuterol/ipratropium for Nebulization 3 milliLiter(s) Nebulizer every 12 hours    acetaminophen   Oral Liquid .. 650 milliGRAM(s) Oral every 6 hours PRN  escitalopram Solution 10 milliGRAM(s) Oral daily  levETIRAcetam  Solution 500 milliGRAM(s) Oral two times a day  melatonin 6 milliGRAM(s) Oral at bedtime  QUEtiapine 25 milliGRAM(s) Oral <User Schedule>    pantoprazole   Suspension 40 milliGRAM(s) Oral every 12 hours  polyethylene glycol 3350 17 Gram(s) Oral daily  senna Syrup 10 milliLiter(s) Oral at bedtime  sucralfate suspension 1 Gram(s) Enteral Tube two times a day    dextrose 50% Injectable 25 Gram(s) IV Push once  dextrose 50% Injectable 25 Gram(s) IV Push once  dextrose 50% Injectable 12.5 Gram(s) IV Push once  dextrose Oral Gel 15 Gram(s) Oral once PRN  glucagon  Injectable 1 milliGRAM(s) IntraMuscular once  insulin glargine Injectable (LANTUS) 12 Unit(s) SubCutaneous <User Schedule>  insulin lispro (ADMELOG) corrective regimen sliding scale   SubCutaneous every 6 hours    artificial  tears Solution 1 Drop(s) Left EYE four times a day  chlorhexidine 0.12% Liquid 15 milliLiter(s) Oral Mucosa every 12 hours  chlorhexidine 2% Cloths 1 Application(s) Topical daily  dextrose 5%. 1000 milliLiter(s) IV Continuous <Continuous>  dextrose 5%. 1000 milliLiter(s) IV Continuous <Continuous>  petrolatum Ophthalmic Ointment 1 Application(s) Left EYE at bedtime      PAST MEDICAL/SURGICAL HISTORY  PAST MEDICAL & SURGICAL HISTORY:  Hyperlipemia      Hypertension      Coronary Artery Disease      Diabetes Mellitus Type II      Stented Coronary Artery  total 5 stents, last stent 5/2019      Neuropathy      Myocardial infarction      Stroke  mild left facial numbness   no other residuals verbalized      Myoclonic jerking      Stage 3 chronic kidney disease      History of Cataract Extraction      Hx of CABG      H/O coronary angiogram      S/P coronary artery stent placement  1/6/09      S/P placement of cardiac pacemaker          SOCIAL HISTORY: Substance Use (street drugs): ( x ) never used  (  ) other:    FAMILY HISTORY:  No pertinent family history in first degree relatives            PHYSICAL EXAM:  T(C): 36.4 (03-02-24 @ 08:51), Max: 36.8 (03-02-24 @ 04:00)  HR: 96 (03-02-24 @ 10:41) (78 - 99)  BP: 135/75 (03-02-24 @ 08:51) (112/57 - 143/69)  RR: 30 (03-02-24 @ 08:51) (12 - 30)  SpO2: 99% (03-02-24 @ 10:41) (94% - 100%)  Wt(kg): --  I&O's Summary    01 Mar 2024 07:01  -  02 Mar 2024 07:00  --------------------------------------------------------  IN: 1800 mL / OUT: 1800 mL / NET: 0 mL        GENERAL: NAD  EYES:  conjunctiva and sclera clear  ENMT: s/p trach  Cardiovascular: Normal S1 S2, No JVD, No murmurs, No edema  Respiratory: Lungs clear to auscultation	  Gastrointestinal:  Soft, s/p PEG  Extremities: No edema                                       8.4    7.53  )-----------( 433      ( 02 Mar 2024 05:45 )             26.0     03-02    135  |  94<L>  |  37<H>  ----------------------------<  187<H>  4.0   |  28  |  1.33<H>    Ca    8.7      02 Mar 2024 05:45  Phos  3.1     03-02  Mg     2.20     03-02    TPro  6.8  /  Alb  2.5<L>  /  TBili  0.3  /  DBili  <0.2  /  AST  37  /  ALT  23  /  AlkPhos  166<H>  03-01    proBNP:   Lipid Profile:   HgA1c:   TSH:     Consultant(s) Notes Reviewed:  [x ] YES  [ ] NO    Care Discussed with Consultants/Other Providers [ x] YES  [ ] NO    Imaging Personally Reviewed independently:  [x] YES  [ ] NO    All labs, radiologic studies, vitals, orders and medications list reviewed. Patient is seen and examined at bedside. Case discussed with medical team.

## 2024-03-02 NOTE — PROGRESS NOTE ADULT - ASSESSMENT
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     EKG SR RBBB (old per family)     1) Hypoxia   - s/p bronch   - Rt pleural effusion   - held lasix given Hypernatremia and worsening creat , CXR with cephalization, given  a does of IV lasix monitor u/o renal function    - f/u neuro recs MRI brain shows no acute disease  - s/p trach and PEG       2) CAD s/p CABG  - p/w chest pain. EKG non ischemic , trop -sera   - chest pains  Trop mildly elevated and trending down likely sec to kidney disease,   - holding brilinta, was on it for SMA stent placed in 12/2023, held 2/2 anticipation for PEG placement, restart when no further invasive procedures planned  -TTE 2/12 with  mod decrease LV systolic function, new. euvolemic on exam,on lasix PRN per RCU given current condition will medically manage likely stress induced     3) Covid +  - s/p remdesivir   - c/w supportive management   - t/t per primary team   -resolved    4) Abd distension   -CTA AP obtained which showed  partially occlusive calcified and non-calcified plaque just distal to take-off of the SMA, with estimated at least 75% luminal narrowing. Limited evaluation of its distal branches. 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     EKG SR RBBB (old per family)     1) Hypoxia   - s/p bronch   - Rt pleural effusion   - held lasix given Hypernatremia and worsening creat , CXR with cephalization, given  a does of IV lasix monitor u/o renal function    - f/u neuro recs MRI brain shows no acute disease  - s/p trach and PEG  - agitation Seroquel increased last Qtc 554  , repeat EKG , monitor Qtc         2) CAD s/p CABG  - p/w chest pain. EKG non ischemic , trop -sera   - chest pains  Trop mildly elevated and trending down likely sec to kidney disease,   - holding brilinta, was on it for SMA stent placed in 12/2023, held 2/2 anticipation for PEG placement, restart when no further invasive procedures planned  -TTE 2/12 with  mod decrease LV systolic function, new. euvolemic on exam,on lasix PRN per RCU given current condition will medically manage likely stress induced     3) Covid +  - s/p remdesivir   - c/w supportive management   - t/t per primary team   -resolved    4) Abd distension   -CTA AP obtained which showed  partially occlusive calcified and non-calcified plaque just distal to take-off of the SMA, with estimated at least 75% luminal narrowing. Limited evaluation of its distal branches. 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   -on Protonix IV q 12      6) Afib  -hx of PAF  -no AC 2/2 GIB  -on coreg 6.25mg BID

## 2024-03-02 NOTE — CHART NOTE - NSCHARTNOTEFT_GEN_A_CORE
RCU PA NOTE     QTC corrected via bazzett's formula 490ms   Will continue to monitor patient     ALISTAIR Thomas, PA-C  Department of Medicine/ RCU  In house RCU [place] [id number]  In house Medicine [facility] [id number]  Reachable via teams

## 2024-03-02 NOTE — PROGRESS NOTE ADULT - NS ATTEND AMEND GEN_ALL_CORE FT
agree with above  afebrile, on ertapenem  diuresed well  increasing seroquel dose for agitation/pulling at trach

## 2024-03-02 NOTE — PROGRESS NOTE ADULT - ASSESSMENT
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 Course ultimately complicated by progressive WOB and O2 demand and patient intubated and transferred to MICU (1/22). Course further complicated by acute cholecystitis and s/p Perc Lynsey with IR on (1/26), HFrEF 38, pulmonary edema, superimposed PNA, failed extubation failed and prolonged vent time and s/p trach (2/13). Patient transferred to RCU (2/16) and s/p PEG (2/20)     NEUROLOGY   # Encephalopathy   - Hx of CVA with no residual deficits per family, however per family worsening confusion at home over the past 6 months (becoming disoriented to time) but prior to admission has been more confused, talking about seeing people that are not present.  - CTH (1/31) with no evidence of acute infarct  - Continue on ASA and brilinta  - Holding Neurontin, Memantine and Oxycodone in setting of somnolence    # ICA stenosis   - CTA NECK (1/31) with moderate to severe narrowing left internal carotid at the origin. Severe narrowing right internal carotid by a tandem lesion 1.5 cm above the bifurcation with a narrowed internal carotid beyond the lesion. Extensive calcified plaque both cavernous and supraclinoid carotid arteries with narrowing but no occlusion. Mild narrowing proximal right M1 segment. Moderate narrowing both vertebral V4 segments. No perfusion abnormality at the Tmax 6 second threshold, but moderate hypoperfusion in the right MCA territory at the 4 second threshold.   - Continue on ASA and brilinta    # Myoclonic jerks   - Hx of myoclonic jerks post CVAs   - EEG (1/18-2/6) negative for seizures  - Continue on keppra and per neuro wishes to wean, however family wishes to keep current dose as home medication.     # Depression/ Insomnia  - Continue on lexapro per home medication   - Insomnic per family and melatonin and seroquel 12.5 QHS added   - Supportive care     CARDIOLOGY   # Vasoplegic vs septic shock  # Hx of HTN   - Weaned off pressors in ICU and now with mild hypertension   - Continue on lopressor as below and monitor HR     # AFIB RVR   # Bifasicular Block with Medtronic PPM   - AFIB RVR episodes and PPM interrogated (2/20) by EP with similar episodes  - Previous admission in 6/2022 with cardiogenic syncope second to bifasicular block, however now with PPM and AV myron blockers ok.   - Continue on lopressor 12.5mg BID however replaced with coreg 6.25 second to HFrEF   - AC discussed at length however with recent GIB will hold for now     # HFrEF 38 likely stress induced?   # Mild to moderate AS   - ECHO (12/2023) with EF 62 with normal LVSF and mild to moderate AS   - ECHO (2/2024) with EF 38, moderate LVSD with global LV hypokinesis, mildly reduced RVSF (TAPSE 1.3), mild to moderate MR, mild AR, and moderate AS.   - Continue on coreg 6.25, hydral 10 and isordil 5   - Continue on diuresis by dose (lasix 40mg IVP given 3/1)  - Given moderate AS monitor BP closely and avoid hypertension     # CAD with CABG   - CATH (5/2022) with dLAD 70, SVG graft to OM1 with 90 in stent stenosis s/p PCI and GEMMA placement, and LIMA graft to mLAD with no disease.   - Continue on ASA and brilinta    RESPIRATORY   # Acute respiratory failure second to SARS-COV-2 vs pulm edema vs PNA  - Presented with COVID complicated by sepsis second to acute lynsey and worsening respiratory failure second to pulmonary edema requiring intubation.   - Prolonged intubation and s/p tracheostomy (2/13)   - CT CHEST 1/16 with new small bilateral pleural effusions/ atelectasis/ patchy bilateral ground-glass opacities are consistent with pulmonary edema.  - Completed ABX and COVID regimen as below   - Continue on vent and initially tolerates SBT 15/5 however now with poor tolerance second to weakness vs volume   - Continue on nebs and hold further HTS given intermittent hemoptysis  - Continue on diuresis as above    # Mild hemoptysis likely from suction trauma   - s/p bronch (2/18) with no active bleed  - Hemoptysis improved with TXA and holding further HTS as above   - Tiny hemoptysis second to suction trauma imporving  - Careful with suctioning     HEENT   # L eye subconjunctival hemorrhage   - Continue on artifical tears and lacrilube   - Optho eval appreciated     GI  # Nutrition/ Dysphagia   - PEG placed by GI on 2/20 and tube feeds continued.   - Loose stools and banatrol continued     # UGIB   - EGD (1/2) with esophagitis and bleeding Dieulafoy's lesion s/p clips.   - Continue on PPI and carafate     # SMA calcification   - CTA demonstrating mesenteric fat stranding associated with ascending/transverse colon  - Diagnostic laparoscopy (12/24) with small bowel and visible colon viable, some inflammation of omentum in RUQ  - SMA Angiogram and stent placed (12/25).   - Continue on ASA and brilinta     # Acute Cholecystitis   - CT (12/26) with distended GB with cholelithiasis and sludge   - HIDA (12/29) POSITIVE for acute cholecystitis   - Case discussed with IR at length and s/p PERC LYNSEY (1/26)   - Completed zosyn course (12/24-12/31)     / RENAL   # CKD   - CRE at baseline   - Monitor renal function and UOP     # Hypernatremia   -  Q4H added with improvement   - Monitor closely with diuresis as above     INFECTIOUS DISEASE   # COVID with superimposed PNA   # ESBL Kleb PNA   - COVID POSITIVE (12/23) and completed remdesivir x 1 dose and paxlovid course.   - WOB worsened as above requiring intubation and cultures negative. Zosyn completed empirically however hypothermic (2/11) and BCx, UA, RVP and BAL negative.   - Completed additional zosyn (2/12-2/19) empirically   - Fever spike and thick secretions and SCX (2/26) with ESBL klebs.   - Started on Zosyn (2/27 - 2/29) but resistant and changed to Erta (2/29 - )     HEME  # AOCD   - Monitor HH   - DVT PPX with HSQ     ENDOCRINE   # DM2 A1C 9.3   - Continue on lantus 12 and ISS     SKIN/ TUBES   - Trach (2/13)   - PEG (2/20)   - PERC LYNSEY (1/26 - )     ETHICS   - FULL CODE     DISPO - vent facility and family aware. SW aware to send out to facilities and family to discuss on DISPO plan.    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 Course ultimately complicated by progressive WOB and O2 demand and patient intubated and transferred to MICU (1/22). Course further complicated by acute cholecystitis and s/p Perc Lynsey with IR on (1/26), HFrEF 38, pulmonary edema, superimposed PNA, failed extubation failed and prolonged vent time and s/p trach (2/13). Patient transferred to RCU (2/16) and s/p PEG (2/20)     NEUROLOGY   # Encephalopathy   - Hx of CVA with no residual deficits per family, however per family worsening confusion at home over the past 6 months (becoming disoriented to time) but prior to admission has been more confused, talking about seeing people that are not present.  - CTH (1/31) with no evidence of acute infarct  - Continue on ASA and brilinta  - Holding Neurontin, Memantine and Oxycodone in setting of somnolence    # ICA stenosis   - CTA NECK (1/31) with moderate to severe narrowing left internal carotid at the origin. Severe narrowing right internal carotid by a tandem lesion 1.5 cm above the bifurcation with a narrowed internal carotid beyond the lesion. Extensive calcified plaque both cavernous and supraclinoid carotid arteries with narrowing but no occlusion. Mild narrowing proximal right M1 segment. Moderate narrowing both vertebral V4 segments. No perfusion abnormality at the Tmax 6 second threshold, but moderate hypoperfusion in the right MCA territory at the 4 second threshold.   - Continue on ASA and brilinta    # Myoclonic jerks   - Hx of myoclonic jerks post CVAs   - EEG (1/18-2/6) negative for seizures  - Continue on keppra and per neuro wishes to wean, however family wishes to keep current dose as home medication.     # Depression/ Insomnia  - Continue on lexapro per home medication   - Insomnic per family and melatonin and seroquel 25 QHS (increased 3/2 and QTC 490ms corrected)  - Supportive care     CARDIOLOGY   # Vasoplegic vs septic shock  # Hx of HTN   - Weaned off pressors in ICU and now with mild hypertension   - Continue on lopressor as below and monitor HR     # AFIB RVR   # Bifasicular Block with Medtronic PPM   - AFIB RVR episodes and PPM interrogated (2/20) by EP with similar episodes  - Previous admission in 6/2022 with cardiogenic syncope second to bifasicular block, however now with PPM and AV myron blockers ok.   - Continue on lopressor 12.5mg BID however replaced with coreg 6.25 second to HFrEF   - AC discussed at length however with recent GIB will hold for now     # HFrEF 38 likely stress induced?   # Mild to moderate AS   - ECHO (12/2023) with EF 62 with normal LVSF and mild to moderate AS   - ECHO (2/2024) with EF 38, moderate LVSD with global LV hypokinesis, mildly reduced RVSF (TAPSE 1.3), mild to moderate MR, mild AR, and moderate AS.   - Continue on coreg 6.25 and hydral 25  - Hold isordil and uptitrate above medications first   - Continue on diuresis by dose (lasix 40mg IVP given 3/1)  - Given moderate AS monitor BP closely and avoid hypertension   - Pending RPT ECHO     # CAD with CABG   - CATH (5/2022) with dLAD 70, SVG graft to OM1 with 90 in stent stenosis s/p PCI and GEMMA placement, and LIMA graft to mLAD with no disease.   - Continue on ASA and brilinta    RESPIRATORY   # Acute respiratory failure second to SARS-COV-2 vs pulm edema vs PNA  - Presented with COVID complicated by sepsis second to acute lynsey and worsening respiratory failure second to pulmonary edema requiring intubation.   - Prolonged intubation and s/p tracheostomy (2/13)   - CT CHEST 1/16 with new small bilateral pleural effusions/ atelectasis/ patchy bilateral ground-glass opacities are consistent with pulmonary edema.  - Completed ABX and COVID regimen as below   - Continue on vent and initially tolerating some SBT 15/5 today   - Continue on nebs and hold further HTS given intermittent hemoptysis  - Continue on diuresis as above    # Mild hemoptysis likely from suction trauma   - s/p bronch (2/18) with no active bleed  - Hemoptysis improved with TXA and holding further HTS as above   - Tiny hemoptysis second to suction trauma imporving  - Careful with suctioning     HEENT   # L eye subconjunctival hemorrhage   - Continue on artifical tears and lacrilube   - Optho eval appreciated     GI  # Nutrition/ Dysphagia   - PEG placed by GI on 2/20 and tube feeds continued.   - Loose stools and banatrol continued     # UGIB   - EGD (1/2) with esophagitis and bleeding Dieulafoy's lesion s/p clips.   - Continue on PPI and carafate     # SMA calcification   - CTA demonstrating mesenteric fat stranding associated with ascending/transverse colon  - Diagnostic laparoscopy (12/24) with small bowel and visible colon viable, some inflammation of omentum in RUQ  - SMA Angiogram and stent placed (12/25).   - Continue on ASA and brilinta     # Acute Cholecystitis   - CT (12/26) with distended GB with cholelithiasis and sludge   - HIDA (12/29) POSITIVE for acute cholecystitis   - Case discussed with IR at length and s/p PERC LYNSEY (1/26)   - Completed zosyn course (12/24-12/31)     / RENAL   # CKD   - CRE at baseline   - Monitor renal function and UOP     # Hypernatremia   -  Q4H added with improvement   - Monitor closely with diuresis as above     INFECTIOUS DISEASE   # COVID with superimposed PNA   # ESBL Kleb PNA   - COVID POSITIVE (12/23) and completed remdesivir x 1 dose and paxlovid course.   - WOB worsened as above requiring intubation and cultures negative. Zosyn completed empirically however hypothermic (2/11) and BCx, UA, RVP and BAL negative.   - Completed additional zosyn (2/12-2/19) empirically   - Fever spike and thick secretions and SCX (2/26) with ESBL klebs.   - Started on Zosyn (2/27 - 2/29) but resistant and changed to Erta (2/29 - )     HEME  # AOCD   - Monitor HH   - DVT PPX with HSQ     ENDOCRINE   # DM2 A1C 9.3   - Continue on lantus 12 and ISS     SKIN/ TUBES   - Trach (2/13)   - PEG (2/20)   - PERC LYNSEY (1/26 - )     ETHICS   - FULL CODE     DISPO - vent facility and family aware. SW aware to send out to facilities and family to discuss on DISPO plan.

## 2024-03-03 LAB
ANION GAP SERPL CALC-SCNC: 11 MMOL/L — SIGNIFICANT CHANGE UP (ref 7–14)
BUN SERPL-MCNC: 36 MG/DL — HIGH (ref 7–23)
CALCIUM SERPL-MCNC: 8.3 MG/DL — LOW (ref 8.4–10.5)
CHLORIDE SERPL-SCNC: 94 MMOL/L — LOW (ref 98–107)
CO2 SERPL-SCNC: 30 MMOL/L — SIGNIFICANT CHANGE UP (ref 22–31)
CREAT SERPL-MCNC: 1.2 MG/DL — SIGNIFICANT CHANGE UP (ref 0.5–1.3)
EGFR: 57 ML/MIN/1.73M2 — LOW
GLUCOSE BLDC GLUCOMTR-MCNC: 171 MG/DL — HIGH (ref 70–99)
GLUCOSE BLDC GLUCOMTR-MCNC: 187 MG/DL — HIGH (ref 70–99)
GLUCOSE BLDC GLUCOMTR-MCNC: 236 MG/DL — HIGH (ref 70–99)
GLUCOSE BLDC GLUCOMTR-MCNC: 244 MG/DL — HIGH (ref 70–99)
GLUCOSE BLDC GLUCOMTR-MCNC: 250 MG/DL — HIGH (ref 70–99)
GLUCOSE SERPL-MCNC: 175 MG/DL — HIGH (ref 70–99)
HCT VFR BLD CALC: 26.2 % — LOW (ref 39–50)
HGB BLD-MCNC: 8.4 G/DL — LOW (ref 13–17)
MAGNESIUM SERPL-MCNC: 2.2 MG/DL — SIGNIFICANT CHANGE UP (ref 1.6–2.6)
MCHC RBC-ENTMCNC: 29.1 PG — SIGNIFICANT CHANGE UP (ref 27–34)
MCHC RBC-ENTMCNC: 32.1 GM/DL — SIGNIFICANT CHANGE UP (ref 32–36)
MCV RBC AUTO: 90.7 FL — SIGNIFICANT CHANGE UP (ref 80–100)
NRBC # BLD: 0 /100 WBCS — SIGNIFICANT CHANGE UP (ref 0–0)
NRBC # FLD: 0 K/UL — SIGNIFICANT CHANGE UP (ref 0–0)
PHOSPHATE SERPL-MCNC: 3.3 MG/DL — SIGNIFICANT CHANGE UP (ref 2.5–4.5)
PLATELET # BLD AUTO: 401 K/UL — HIGH (ref 150–400)
POTASSIUM SERPL-MCNC: 4 MMOL/L — SIGNIFICANT CHANGE UP (ref 3.5–5.3)
POTASSIUM SERPL-SCNC: 4 MMOL/L — SIGNIFICANT CHANGE UP (ref 3.5–5.3)
RBC # BLD: 2.89 M/UL — LOW (ref 4.2–5.8)
RBC # FLD: 15.7 % — HIGH (ref 10.3–14.5)
SODIUM SERPL-SCNC: 135 MMOL/L — SIGNIFICANT CHANGE UP (ref 135–145)
WBC # BLD: 6.51 K/UL — SIGNIFICANT CHANGE UP (ref 3.8–10.5)
WBC # FLD AUTO: 6.51 K/UL — SIGNIFICANT CHANGE UP (ref 3.8–10.5)

## 2024-03-03 PROCEDURE — 99233 SBSQ HOSP IP/OBS HIGH 50: CPT

## 2024-03-03 PROCEDURE — 93010 ELECTROCARDIOGRAM REPORT: CPT

## 2024-03-03 RX ADMIN — POLYETHYLENE GLYCOL 3350 17 GRAM(S): 17 POWDER, FOR SOLUTION ORAL at 11:37

## 2024-03-03 RX ADMIN — CHLORHEXIDINE GLUCONATE 1 APPLICATION(S): 213 SOLUTION TOPICAL at 11:35

## 2024-03-03 RX ADMIN — HEPARIN SODIUM 5000 UNIT(S): 5000 INJECTION INTRAVENOUS; SUBCUTANEOUS at 21:25

## 2024-03-03 RX ADMIN — LEVETIRACETAM 500 MILLIGRAM(S): 250 TABLET, FILM COATED ORAL at 17:35

## 2024-03-03 RX ADMIN — ESCITALOPRAM OXALATE 10 MILLIGRAM(S): 10 TABLET, FILM COATED ORAL at 11:37

## 2024-03-03 RX ADMIN — ERTAPENEM SODIUM 120 MILLIGRAM(S): 1 INJECTION, POWDER, LYOPHILIZED, FOR SOLUTION INTRAMUSCULAR; INTRAVENOUS at 12:48

## 2024-03-03 RX ADMIN — SENNA PLUS 10 MILLILITER(S): 8.6 TABLET ORAL at 21:27

## 2024-03-03 RX ADMIN — Medication 650 MILLIGRAM(S): at 08:26

## 2024-03-03 RX ADMIN — Medication 25 MILLIGRAM(S): at 05:14

## 2024-03-03 RX ADMIN — INSULIN GLARGINE 12 UNIT(S): 100 INJECTION, SOLUTION SUBCUTANEOUS at 11:50

## 2024-03-03 RX ADMIN — CHLORHEXIDINE GLUCONATE 15 MILLILITER(S): 213 SOLUTION TOPICAL at 17:34

## 2024-03-03 RX ADMIN — HEPARIN SODIUM 5000 UNIT(S): 5000 INJECTION INTRAVENOUS; SUBCUTANEOUS at 14:17

## 2024-03-03 RX ADMIN — Medication 25 MILLIGRAM(S): at 21:26

## 2024-03-03 RX ADMIN — Medication 1 DROP(S): at 00:48

## 2024-03-03 RX ADMIN — Medication 4: at 11:50

## 2024-03-03 RX ADMIN — CARVEDILOL PHOSPHATE 6.25 MILLIGRAM(S): 80 CAPSULE, EXTENDED RELEASE ORAL at 17:35

## 2024-03-03 RX ADMIN — Medication 1 DROP(S): at 17:34

## 2024-03-03 RX ADMIN — Medication 1 APPLICATION(S): at 21:26

## 2024-03-03 RX ADMIN — Medication 2: at 00:44

## 2024-03-03 RX ADMIN — Medication 81 MILLIGRAM(S): at 11:38

## 2024-03-03 RX ADMIN — Medication 1 DROP(S): at 11:35

## 2024-03-03 RX ADMIN — Medication 2 MILLIGRAM(S): at 21:27

## 2024-03-03 RX ADMIN — Medication 1 DROP(S): at 05:27

## 2024-03-03 RX ADMIN — HEPARIN SODIUM 5000 UNIT(S): 5000 INJECTION INTRAVENOUS; SUBCUTANEOUS at 05:14

## 2024-03-03 RX ADMIN — Medication 3 MILLILITER(S): at 07:09

## 2024-03-03 RX ADMIN — CHLORHEXIDINE GLUCONATE 15 MILLILITER(S): 213 SOLUTION TOPICAL at 06:06

## 2024-03-03 RX ADMIN — LEVETIRACETAM 500 MILLIGRAM(S): 250 TABLET, FILM COATED ORAL at 05:14

## 2024-03-03 RX ADMIN — Medication 1 DROP(S): at 23:17

## 2024-03-03 RX ADMIN — Medication 4: at 17:34

## 2024-03-03 RX ADMIN — TICAGRELOR 60 MILLIGRAM(S): 90 TABLET ORAL at 17:44

## 2024-03-03 RX ADMIN — Medication 4: at 23:18

## 2024-03-03 RX ADMIN — PANTOPRAZOLE SODIUM 40 MILLIGRAM(S): 20 TABLET, DELAYED RELEASE ORAL at 06:06

## 2024-03-03 RX ADMIN — Medication 1 GRAM(S): at 05:13

## 2024-03-03 RX ADMIN — CARVEDILOL PHOSPHATE 6.25 MILLIGRAM(S): 80 CAPSULE, EXTENDED RELEASE ORAL at 05:14

## 2024-03-03 RX ADMIN — Medication 25 MILLIGRAM(S): at 14:18

## 2024-03-03 RX ADMIN — Medication 0.5 MILLIGRAM(S): at 12:48

## 2024-03-03 RX ADMIN — PANTOPRAZOLE SODIUM 40 MILLIGRAM(S): 20 TABLET, DELAYED RELEASE ORAL at 17:35

## 2024-03-03 RX ADMIN — Medication 3 MILLILITER(S): at 21:12

## 2024-03-03 RX ADMIN — Medication 1 GRAM(S): at 17:35

## 2024-03-03 RX ADMIN — TICAGRELOR 60 MILLIGRAM(S): 90 TABLET ORAL at 05:13

## 2024-03-03 RX ADMIN — Medication 6 MILLIGRAM(S): at 21:27

## 2024-03-03 RX ADMIN — Medication 2: at 05:25

## 2024-03-03 NOTE — PROGRESS NOTE ADULT - ASSESSMENT
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 Course ultimately complicated by progressive WOB and O2 demand and patient intubated and transferred to MICU (1/22). Course further complicated by acute cholecystitis and s/p Perc Lynsey with IR on (1/26), HFrEF 38, pulmonary edema, superimposed PNA, failed extubation failed and prolonged vent time and s/p trach (2/13). Patient transferred to RCU (2/16) and s/p PEG (2/20)     NEUROLOGY   # Encephalopathy   - Hx of CVA with no residual deficits per family, however per family worsening confusion at home over the past 6 months (becoming disoriented to time) but prior to admission has been more confused, talking about seeing people that are not present.  - CTH (1/31) with no evidence of acute infarct  - Continue on ASA and brilinta  - Holding Neurontin, Memantine and Oxycodone in setting of somnolence    # ICA stenosis   - CTA NECK (1/31) with moderate to severe narrowing left internal carotid at the origin. Severe narrowing right internal carotid by a tandem lesion 1.5 cm above the bifurcation with a narrowed internal carotid beyond the lesion. Extensive calcified plaque both cavernous and supraclinoid carotid arteries with narrowing but no occlusion. Mild narrowing proximal right M1 segment. Moderate narrowing both vertebral V4 segments. No perfusion abnormality at the Tmax 6 second threshold, but moderate hypoperfusion in the right MCA territory at the 4 second threshold.   - Continue on ASA and brilinta    # Myoclonic jerks   - Hx of myoclonic jerks post CVAs   - EEG (1/18-2/6) negative for seizures  - Continue on keppra and per neuro wishes to wean, however family wishes to keep current dose as home medication.     # Depression/ Insomnia  - Continue on lexapro per home medication   - Insomnic per family and melatonin and seroquel 25 QHS (increased 3/2 and QTC 490ms corrected)  - Supportive care     CARDIOLOGY   # Vasoplegic vs septic shock  # Hx of HTN   - Weaned off pressors in ICU and now with mild hypertension   - Continue on lopressor as below and monitor HR     # AFIB RVR   # Bifasicular Block with Medtronic PPM   - AFIB RVR episodes and PPM interrogated (2/20) by EP with similar episodes  - Previous admission in 6/2022 with cardiogenic syncope second to bifasicular block, however now with PPM and AV myron blockers ok.   - Continue on lopressor 12.5mg BID however replaced with coreg 6.25 second to HFrEF   - AC discussed at length however with recent GIB will hold for now     # HFrEF 38 likely stress induced?   # Mild to moderate AS   - ECHO (12/2023) with EF 62 with normal LVSF and mild to moderate AS   - ECHO (2/2024) with EF 38, moderate LVSD with global LV hypokinesis, mildly reduced RVSF (TAPSE 1.3), mild to moderate MR, mild AR, and moderate AS.   - Continue on coreg 6.25 and hydral 25  - Hold isordil and uptitrate above medications first   - Continue on diuresis by dose (lasix 40mg IVP given 3/1)  - Given moderate AS monitor BP closely and avoid hypertension   - Pending RPT ECHO     # CAD with CABG   - CATH (5/2022) with dLAD 70, SVG graft to OM1 with 90 in stent stenosis s/p PCI and GEMMA placement, and LIMA graft to mLAD with no disease.   - Continue on ASA and brilinta    RESPIRATORY   # Acute respiratory failure second to SARS-COV-2 vs pulm edema vs PNA  - Presented with COVID complicated by sepsis second to acute lynsey and worsening respiratory failure second to pulmonary edema requiring intubation.   - Prolonged intubation and s/p tracheostomy (2/13)   - CT CHEST 1/16 with new small bilateral pleural effusions/ atelectasis/ patchy bilateral ground-glass opacities are consistent with pulmonary edema.  - Completed ABX and COVID regimen as below   - Continue on vent and initially tolerating some SBT 15/5 today   - Continue on nebs and hold further HTS given intermittent hemoptysis  - Continue on diuresis as above    # Mild hemoptysis likely from suction trauma   - s/p bronch (2/18) with no active bleed  - Hemoptysis improved with TXA and holding further HTS as above   - Tiny hemoptysis second to suction trauma imporving  - Careful with suctioning     HEENT   # L eye subconjunctival hemorrhage   - Continue on artifical tears and lacrilube   - Optho eval appreciated     GI  # Nutrition/ Dysphagia   - PEG placed by GI on 2/20 and tube feeds continued.   - Loose stools and banatrol continued     # UGIB   - EGD (1/2) with esophagitis and bleeding Dieulafoy's lesion s/p clips.   - Continue on PPI and carafate     # SMA calcification   - CTA demonstrating mesenteric fat stranding associated with ascending/transverse colon  - Diagnostic laparoscopy (12/24) with small bowel and visible colon viable, some inflammation of omentum in RUQ  - SMA Angiogram and stent placed (12/25).   - Continue on ASA and brilinta     # Acute Cholecystitis   - CT (12/26) with distended GB with cholelithiasis and sludge   - HIDA (12/29) POSITIVE for acute cholecystitis   - Case discussed with IR at length and s/p PERC LYNSEY (1/26)   - Completed zosyn course (12/24-12/31)     / RENAL   # CKD   - CRE at baseline   - Monitor renal function and UOP     # Hypernatremia   -  Q4H added with improvement   - Monitor closely with diuresis as above     INFECTIOUS DISEASE   # COVID with superimposed PNA   # ESBL Kleb PNA   - COVID POSITIVE (12/23) and completed remdesivir x 1 dose and paxlovid course.   - WOB worsened as above requiring intubation and cultures negative. Zosyn completed empirically however hypothermic (2/11) and BCx, UA, RVP and BAL negative.   - Completed additional zosyn (2/12-2/19) empirically   - Fever spike and thick secretions and SCX (2/26) with ESBL klebs.   - Started on Zosyn (2/27 - 2/29) but resistant and changed to Erta (2/29 - )     HEME  # AOCD   - Monitor HH   - DVT PPX with HSQ     ENDOCRINE   # DM2 A1C 9.3   - Continue on lantus 12 and ISS     SKIN/ TUBES   - Trach (2/13)   - PEG (2/20)   - PERC LYNSEY (1/26 - )     ETHICS   - FULL CODE     DISPO - vent facility and family aware. SW aware to send out to facilities and family to discuss on DISPO plan.  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 Course ultimately complicated by progressive WOB and O2 demand and patient intubated and transferred to MICU (1/22). Course further complicated by acute cholecystitis and s/p Perc Lynsey with IR on (1/26), HFrEF 38, pulmonary edema, superimposed PNA, failed extubation failed and prolonged vent time and s/p trach (2/13). Patient transferred to RCU (2/16) and s/p PEG (2/20)     NEUROLOGY   # Encephalopathy   - Hx of CVA with no residual deficits per family, however per family worsening confusion at home over the past 6 months (becoming disoriented to time) but prior to admission has been more confused, talking about seeing people that are not present.  - CTH (1/31) with no evidence of acute infarct  - Continue on ASA and Brilinta  - Holding Neurontin, Memantine and Oxycodone in setting of somnolence    # ICA stenosis   - CTA NECK (1/31) with moderate to severe narrowing left internal carotid at the origin. Severe narrowing right internal carotid by a tandem lesion 1.5 cm above the bifurcation with a narrowed internal carotid beyond the lesion. Extensive calcified plaque both cavernous and supraclinoid carotid arteries with narrowing but no occlusion. Mild narrowing proximal right M1 segment. Moderate narrowing both vertebral V4 segments. No perfusion abnormality at the Tmax 6 second threshold, but moderate hypoperfusion in the right MCA territory at the 4 second threshold.   - Continue on ASA and Brilinta    # Myoclonic jerks   - Hx of myoclonic jerks post CVAs   - EEG (1/18-2/6) negative for seizures  - Continue on Keppra and per neuro wishes to wean, however family wishes to keep current dose as home medication.     # Depression/ Insomnia  - Continue on Lexapro per home medication   - Insomnic per family and melatonin and Seroquel 25 QHS (increased 3/2 and QTC 490ms corrected)  - QTC on (3/3) 634, Seroquel d'dangelo, and started on 0.5 mg q6 IV Ativan PRN for agitation   - Supportive care     CARDIOLOGY   # Vasoplegic vs septic shock  # Hx of HTN   - Weaned off pressors in ICU and now with mild hypertension   - Continue on lopressor as below and monitor HR     # AFIB RVR   # Bifascicular Block with Pearltreestronic PPM   - AFIB RVR episodes and PPM interrogated (2/20) by EP with similar episodes  - Previous admission in 6/2022 with cardiogenic syncope second to bifasicular block, however now with PPM and AV myron blockers ok.   - Continue on lopressor 12.5mg BID however replaced with coreg 6.25 second to HFrEF   - AC discussed at length however with recent GIB will hold for now     # HFrEF 38 likely stress induced?   # Mild to moderate AS   - ECHO (12/2023) with EF 62 with normal LVSF and mild to moderate AS   - ECHO (2/2024) with EF 38, moderate LVSD with global LV hypokinesis, mildly reduced RVSF (TAPSE 1.3), mild to moderate MR, mild AR, and moderate AS.   - Continue on coreg 6.25 and hydralazine  25  - Hold isordil and up titrate above medications first   - s/p diuresis by dose (Lasix 40mg IVP given 3/1)  - Will continue with Lasix 20mg qd   - Given moderate AS monitor BP closely and avoid hypertension   - Pending RPT ECHO     # CAD with CABG   - CATH (5/2022) with dLAD 70, SVG graft to OM1 with 90 in stent stenosis s/p PCI and GEMMA placement, and LIMA graft to mLAD with no disease.   - Continue on ASA and brilinta    RESPIRATORY   # Acute respiratory failure second to SARS-COV-2 vs pulm edema vs PNA  - Presented with COVID complicated by sepsis second to acute lynsey and worsening respiratory failure second to pulmonary edema requiring intubation.   - Prolonged intubation and s/p tracheostomy (2/13)   - CT CHEST 1/16 with new small bilateral pleural effusions/ atelectasis/ patchy bilateral ground-glass opacities are consistent with pulmonary edema.  - Completed ABX and COVID regimen as below   - Continue on vent and initially tolerating some SBT 15/5 today   - Continue on nebs and hold further HTS given intermittent hemoptysis  - Continue on diuresis as above    # Mild hemoptysis likely from suction trauma   - s/p bronch (2/18) with no active bleed  - Hemoptysis improved with TXA and holding further HTS as above   - Tiny hemoptysis second to suction trauma imporving  - Careful with suctioning     HEENT   # L eye subconjunctival hemorrhage   - Continue on artifical tears and Lacrilube   - Optho eval appreciated     GI  # Nutrition/ Dysphagia   - PEG placed by GI on 2/20 and tube feeds continued.   - Loose stools and Banatrol continued     # UGIB   - EGD (1/2) with esophagitis and bleeding Dieulafoy's lesion s/p clips.   - Continue on PPI and carafate     # SMA calcification   - CTA demonstrating mesenteric fat stranding associated with ascending/transverse colon  - Diagnostic laparoscopy (12/24) with small bowel and visible colon viable, some inflammation of omentum in RUQ  - SMA Angiogram and stent placed (12/25).   - Continue on ASA and Brilinta     # Acute Cholecystitis   - CT (12/26) with distended GB with cholelithiasis and sludge   - HIDA (12/29) POSITIVE for acute cholecystitis   - Case discussed with IR at length and s/p PERC LYNSEY (1/26)   - Completed zosyn course (12/24-12/31)     / RENAL   # CKD   - CRE at baseline   - Monitor renal function and UOP     # Hypernatremia   -  Q4H added with improvement   - Monitor closely with diuresis as above     INFECTIOUS DISEASE   # COVID with superimposed PNA   # ESBL Kleb PNA   - COVID POSITIVE (12/23) and completed remdesivir x 1 dose and paxlovid course.   - WOB worsened as above requiring intubation and cultures negative. Zosyn completed empirically however hypothermic (2/11) and BCx, UA, RVP and BAL negative.   - Completed additional zosyn (2/12-2/19) empirically   - Fever spike and thick secretions and SCX (2/26) with ESBL klebs.   - s/p Zosyn (2/27 - 2/29) but resistant and changed to Erta (2/29 - )     HEME  # AOCD   - Monitor HH   - DVT PPX with HSQ     ENDOCRINE   # DM2 A1C 9.3   - Continue on Lantus 12 and ISS     SKIN/ TUBES   - Trach (2/13)   - PEG (2/20)   - PERC LYNSEY (1/26 - )     ETHICS   - FULL CODE     DISPO - vent facility and family aware. SW aware to send out to facilities and family to discuss on DISPO plan.  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 Course ultimately complicated by progressive WOB and O2 demand and patient intubated and transferred to MICU (1/22). Course further complicated by acute cholecystitis and s/p Perc Lynsey with IR on (1/26), HFrEF 38, pulmonary edema, superimposed PNA, failed extubation failed and prolonged vent time and s/p trach (2/13). Patient transferred to RCU (2/16) and s/p PEG (2/20)     NEUROLOGY   # Encephalopathy   - Hx of CVA with no residual deficits per family, however per family worsening confusion at home over the past 6 months (becoming disoriented to time) but prior to admission has been more confused, talking about seeing people that are not present.  - CTH (1/31) with no evidence of acute infarct  - Continue on ASA and Brilinta  - Holding Neurontin, Memantine and Oxycodone in setting of somnolence    # ICA stenosis   - CTA NECK (1/31) with moderate to severe narrowing left internal carotid at the origin. Severe narrowing right internal carotid by a tandem lesion 1.5 cm above the bifurcation with a narrowed internal carotid beyond the lesion. Extensive calcified plaque both cavernous and supraclinoid carotid arteries with narrowing but no occlusion. Mild narrowing proximal right M1 segment. Moderate narrowing both vertebral V4 segments. No perfusion abnormality at the Tmax 6 second threshold, but moderate hypoperfusion in the right MCA territory at the 4 second threshold.   - Continue on ASA and Brilinta    # Myoclonic jerks   - Hx of myoclonic jerks post CVAs   - EEG (1/18-2/6) negative for seizures  - Continue on Keppra and per neuro wishes to wean, however family wishes to keep current dose as home medication.     # Depression/ Insomnia  - Continue on Lexapro per home medication   - Insomnic per family and melatonin and Seroquel 25 QHS (increased 3/2 and QTC 490ms corrected)  - QTC on (3/3) 634, Seroquel d'dangelo, and started on 0.5 mg q6 IV Ativan PRN for agitation   - Supportive care     CARDIOLOGY   # Vasoplegic vs septic shock  # Hx of HTN   - Weaned off pressors in ICU and now with mild hypertension   - Continue on lopressor as below and monitor HR     # AFIB RVR   # Bifascicular Block with Chosen.fmtronic PPM   - AFIB RVR episodes and PPM interrogated (2/20) by EP with similar episodes  - Previous admission in 6/2022 with cardiogenic syncope second to bifasicular block, however now with PPM and AV myron blockers ok.   - Continue on lopressor 12.5mg BID however replaced with coreg 6.25 second to HFrEF   - AC discussed at length however with recent GIB will hold for now     # HFrEF 38 likely stress induced?   # Mild to moderate AS   - ECHO (12/2023) with EF 62 with normal LVSF and mild to moderate AS   - ECHO (2/2024) with EF 38, moderate LVSD with global LV hypokinesis, mildly reduced RVSF (TAPSE 1.3), mild to moderate MR, mild AR, and moderate AS.   - Continue on coreg 6.25 and hydralazine  25  - Hold isordil and up titrate above medications first   - s/p diuresis by dose (Lasix 40mg IVP given 3/1)  - Given moderate AS monitor BP closely and avoid hypertension   - Pending RPT ECHO     # CAD with CABG   - CATH (5/2022) with dLAD 70, SVG graft to OM1 with 90 in stent stenosis s/p PCI and GEMMA placement, and LIMA graft to mLAD with no disease.   - Continue on ASA and brilinta    RESPIRATORY   # Acute respiratory failure second to SARS-COV-2 vs pulm edema vs PNA  - Presented with COVID complicated by sepsis second to acute lynsey and worsening respiratory failure second to pulmonary edema requiring intubation.   - Prolonged intubation and s/p tracheostomy (2/13)   - CT CHEST 1/16 with new small bilateral pleural effusions/ atelectasis/ patchy bilateral ground-glass opacities are consistent with pulmonary edema.  - Completed ABX and COVID regimen as below   - Continue on vent and initially tolerating some SBT 15/5 today   - Continue on nebs and hold further HTS given intermittent hemoptysis  - Continue on diuresis as above    # Mild hemoptysis likely from suction trauma   - s/p bronch (2/18) with no active bleed  - Hemoptysis improved with TXA and holding further HTS as above   - Tiny hemoptysis second to suction trauma imporving  - Careful with suctioning     HEENT   # L eye subconjunctival hemorrhage   - Continue on artifical tears and Lacrilube   - Optho eval appreciated     GI  # Nutrition/ Dysphagia   - PEG placed by GI on 2/20 and tube feeds continued.   - Loose stools and Banatrol continued     # UGIB   - EGD (1/2) with esophagitis and bleeding Dieulafoy's lesion s/p clips.   - Continue on PPI and carafate     # SMA calcification   - CTA demonstrating mesenteric fat stranding associated with ascending/transverse colon  - Diagnostic laparoscopy (12/24) with small bowel and visible colon viable, some inflammation of omentum in RUQ  - SMA Angiogram and stent placed (12/25).   - Continue on ASA and Brilinta     # Acute Cholecystitis   - CT (12/26) with distended GB with cholelithiasis and sludge   - HIDA (12/29) POSITIVE for acute cholecystitis   - Case discussed with IR at length and s/p PERC LYNSEY (1/26)   - Completed zosyn course (12/24-12/31)     / RENAL   # CKD   - CRE at baseline   - Monitor renal function and UOP     # Hypernatremia   -  Q4H added with improvement   - Monitor closely with diuresis as above     INFECTIOUS DISEASE   # COVID with superimposed PNA   # ESBL Kleb PNA   - COVID POSITIVE (12/23) and completed remdesivir x 1 dose and paxlovid course.   - WOB worsened as above requiring intubation and cultures negative. Zosyn completed empirically however hypothermic (2/11) and BCx, UA, RVP and BAL negative.   - Completed additional zosyn (2/12-2/19) empirically   - Fever spike and thick secretions and SCX (2/26) with ESBL klebs.   - s/p Zosyn (2/27 - 2/29) but resistant and changed to Erta (2/29 - )     HEME  # AOCD   - Monitor HH   - DVT PPX with HSQ     ENDOCRINE   # DM2 A1C 9.3   - Continue on Lantus 12 and ISS     SKIN/ TUBES   - Trach (2/13)   - PEG (2/20)   - PERC LYNSEY (1/26 - )     ETHICS   - FULL CODE     DISPO - vent facility and family aware. SW aware to send out to facilities and family to discuss on DISPO plan.

## 2024-03-03 NOTE — PROGRESS NOTE ADULT - ASSESSMENT
89M w/ DM2, HTN, CVA, PAD, CKD3, and CAD-CABG, 12/23/23 p/w COVID19 infection, c/b respiratory failure/hypotension; now s/p tracheostomy    (1)Renal - CKD3; superimposed mild prerenal azotemia - numbers fluctuating based on hemodynamic status-- but stable   (2)Hypokalemia - improved s/p repletion   (3)CV - now off pressors and midodrine, BP improved/stable    (4)Pulm - vent-dependent via trach   (5ID - afebrile, s/p zosyn   (6)GI - s/p PEG (2/20)  (7)Hypernatremia - possibly due to diuresis, on free water. Improved    RECOMMEND:   (1)cont lasix prn for hypervolemia  (lasix 40mg IVP last given 3/1)  (2)C/w Free water 250 cc q6h   (3)Continue meds for GFR 30-40ml/min  (4)Monitor I/O's/ Daily weight  (5)C/w IV abx      Nina Yan- DENISE  [facility]  Office: [phone]

## 2024-03-03 NOTE — PROGRESS NOTE ADULT - ASSESSMENT
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     EKG SR RBBB (old per family)     1) Hypoxia   - s/p bronch   - Rt pleural effusion   - held lasix given Hypernatremia and worsening creat , CXR with cephalization, given a does of IV lasix monitor u/o renal function    - f/u neuro recs MRI brain shows no acute disease  - s/p trach and PEG  - agitation Seroquel increased last Qtc 554  , repeat EKG , monitor Qtc         2) CAD s/p CABG  - p/w chest pain. EKG non ischemic , trop -sera   - chest pains  Trop mildly elevated and trending down likely sec to kidney disease,   - holding brilinta, was on it for SMA stent placed in 12/2023, held 2/2 anticipation for PEG placement, restart when no further invasive procedures planned  -TTE 2/12 with  mod decrease LV systolic function, new.  will medically manage likely stress induced. c/w IV lasixs 40mg daily    3) Covid +  - s/p remdesivir   - c/w supportive management   - t/t per primary team   -resolved    4) Abd distension   -CTA AP obtained which showed  partially occlusive calcified and non-calcified plaque just distal to take-off of the SMA, with estimated at least 75% luminal narrowing. Limited evaluation of its distal branches. 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   -on Protonix IV q 12      6) Afib  -hx of PAF  -no AC 2/2 GIB  -on coreg 6.25mg BID

## 2024-03-03 NOTE — PROGRESS NOTE ADULT - NS ATTEND AMEND GEN_ALL_CORE FT
agree with above  more agitated today  on ps trial  pulling at vent tubing  qtc now >600, will have to stop seroquel  will place on enhanced care  start with a low dose ativan and monitor response  cardiology follow up appreciated  repeat limited echo ordered  pt's son , a neurologist, at bedside and discussed with harish on th ephone as well

## 2024-03-03 NOTE — PROGRESS NOTE ADULT - SUBJECTIVE AND OBJECTIVE BOX
CHIEF COMPLAINT: Patient is a 89y old  Male who presents with a chief complaint of COVID19, Chest pain (02 Mar 2024 11:47)      Interval Events:    REVIEW OF SYSTEMS:  [ ] All other systems negative  [ ] Unable to assess ROS because ________    Mode: AC/ CMV (Assist Control/ Continuous Mandatory Ventilation), RR (machine): 12, TV (machine): 400, FiO2: 30, PEEP: 5, MAP: 11, PIP: 35      OBJECTIVE:  ICU Vital Signs Last 24 Hrs  T(C): 36.4 (03 Mar 2024 05:13), Max: 36.8 (03 Mar 2024 00:00)  T(F): 97.5 (03 Mar 2024 05:13), Max: 98.2 (03 Mar 2024 00:00)  HR: 90 (03 Mar 2024 07:13) (77 - 96)  BP: 136/69 (03 Mar 2024 05:13) (120/59 - 179/95)  BP(mean): 93 (02 Mar 2024 18:39) (77 - 101)  ABP: --  ABP(mean): --  RR: 13 (03 Mar 2024 05:13) (13 - 27)  SpO2: 96% (03 Mar 2024 07:13) (96% - 99%)    O2 Parameters below as of 03 Mar 2024 07:13  Patient On (Oxygen Delivery Method): ventilator          Mode: AC/ CMV (Assist Control/ Continuous Mandatory Ventilation), RR (machine): 12, TV (machine): 400, FiO2: 30, PEEP: 5, MAP: 11, PIP: 35    03-02 @ 07:01  -  03-03 @ 07:00  --------------------------------------------------------  IN: 1800 mL / OUT: 920 mL / NET: 880 mL      CAPILLARY BLOOD GLUCOSE      POCT Blood Glucose.: 171 mg/dL (03 Mar 2024 05:21)      HOSPITAL MEDICATIONS:  MEDICATIONS  (STANDING):  albuterol/ipratropium for Nebulization 3 milliLiter(s) Nebulizer every 12 hours  artificial  tears Solution 1 Drop(s) Left EYE four times a day  aspirin  chewable 81 milliGRAM(s) Enteral Tube daily  carvedilol 6.25 milliGRAM(s) Oral every 12 hours  chlorhexidine 0.12% Liquid 15 milliLiter(s) Oral Mucosa every 12 hours  chlorhexidine 2% Cloths 1 Application(s) Topical daily  dextrose 5%. 1000 milliLiter(s) (50 mL/Hr) IV Continuous <Continuous>  dextrose 5%. 1000 milliLiter(s) (100 mL/Hr) IV Continuous <Continuous>  dextrose 50% Injectable 25 Gram(s) IV Push once  dextrose 50% Injectable 12.5 Gram(s) IV Push once  dextrose 50% Injectable 25 Gram(s) IV Push once  doxazosin 2 milliGRAM(s) Oral at bedtime  ertapenem  IVPB 1000 milliGRAM(s) IV Intermittent every 24 hours  escitalopram Solution 10 milliGRAM(s) Oral daily  glucagon  Injectable 1 milliGRAM(s) IntraMuscular once  heparin   Injectable 5000 Unit(s) SubCutaneous every 8 hours  hydrALAZINE 25 milliGRAM(s) Oral every 8 hours  insulin glargine Injectable (LANTUS) 12 Unit(s) SubCutaneous <User Schedule>  insulin lispro (ADMELOG) corrective regimen sliding scale   SubCutaneous every 6 hours  levETIRAcetam  Solution 500 milliGRAM(s) Oral two times a day  melatonin 6 milliGRAM(s) Oral at bedtime  pantoprazole   Suspension 40 milliGRAM(s) Oral every 12 hours  petrolatum Ophthalmic Ointment 1 Application(s) Left EYE at bedtime  polyethylene glycol 3350 17 Gram(s) Oral daily  QUEtiapine 25 milliGRAM(s) Oral <User Schedule>  senna Syrup 10 milliLiter(s) Oral at bedtime  sucralfate suspension 1 Gram(s) Enteral Tube two times a day  ticagrelor 60 milliGRAM(s) Oral every 12 hours    MEDICATIONS  (PRN):  acetaminophen   Oral Liquid .. 650 milliGRAM(s) Oral every 6 hours PRN Temp greater or equal to 38C (100.4F), Mild Pain (1 - 3), Moderate Pain (4 - 6)  dextrose Oral Gel 15 Gram(s) Oral once PRN Blood Glucose LESS THAN 70 milliGRAM(s)/deciliter      LABS:                        8.4    6.51  )-----------( 401      ( 03 Mar 2024 05:40 )             26.2     03-03    135  |  94<L>  |  36<H>  ----------------------------<  175<H>  4.0   |  30  |  1.20    Ca    8.3<L>      03 Mar 2024 06:35  Phos  3.3     03-03  Mg     2.20     03-03        Urinalysis Basic - ( 03 Mar 2024 06:35 )    Color: x / Appearance: x / SG: x / pH: x  Gluc: 175 mg/dL / Ketone: x  / Bili: x / Urobili: x   Blood: x / Protein: x / Nitrite: x   Leuk Esterase: x / RBC: x / WBC x   Sq Epi: x / Non Sq Epi: x / Bacteria: x            PAST MEDICAL & SURGICAL HISTORY:  Hyperlipemia      Hypertension      Coronary Artery Disease      Diabetes Mellitus Type II      Stented Coronary Artery  total 5 stents, last stent 5/2019      Neuropathy      Myocardial infarction      Stroke  mild left facial numbness   no other residuals verbalized      Myoclonic jerking      Stage 3 chronic kidney disease      History of Cataract Extraction      Hx of CABG      H/O coronary angiogram      S/P coronary artery stent placement  1/6/09      S/P placement of cardiac pacemaker          FAMILY HISTORY:  No pertinent family history in first degree relatives        Social History:  Lives with family  Ambulates with walker  Denies tobacco, EtOH, or illicit substance use (23 Dec 2023 22:51)      RADIOLOGY:  [ ] Reviewed and interpreted by me    PULMONARY FUNCTION TESTS:    EKG: CHIEF COMPLAINT: Patient is a 89y old  Male who presents with a chief complaint of COVID19, Chest pain (02 Mar 2024 11:47)    Interval Events: +attempting to pull at Trach tube. QTC today 634. Will discontinue Seroquel, and start PRN Benzo. VSS, and medications reviewed.     REVIEW OF SYSTEMS:  See above  [x] Unable to assess ROS because poor mental status     Mode: AC/ CMV (Assist Control/ Continuous Mandatory Ventilation), RR (machine): 12, TV (machine): 400, FiO2: 30, PEEP: 5, MAP: 11, PIP: 35    OBJECTIVE:  ICU Vital Signs Last 24 Hrs  T(C): 36.4 (03 Mar 2024 05:13), Max: 36.8 (03 Mar 2024 00:00)  T(F): 97.5 (03 Mar 2024 05:13), Max: 98.2 (03 Mar 2024 00:00)  HR: 90 (03 Mar 2024 07:13) (77 - 96)  BP: 136/69 (03 Mar 2024 05:13) (120/59 - 179/95)  BP(mean): 93 (02 Mar 2024 18:39) (77 - 101)  ABP: --  ABP(mean): --  RR: 13 (03 Mar 2024 05:13) (13 - 27)  SpO2: 96% (03 Mar 2024 07:13) (96% - 99%)    O2 Parameters below as of 03 Mar 2024 07:13  Patient On (Oxygen Delivery Method): ventilator    Mode: AC/ CMV (Assist Control/ Continuous Mandatory Ventilation), RR (machine): 12, TV (machine): 400, FiO2: 30, PEEP: 5, MAP: 11, PIP: 35    03-02 @ 07:01  -  03-03 @ 07:00  --------------------------------------------------------  IN: 1800 mL / OUT: 920 mL / NET: 880 mL    CAPILLARY BLOOD GLUCOSE    POCT Blood Glucose.: 171 mg/dL (03 Mar 2024 05:21)    HOSPITAL MEDICATIONS:  MEDICATIONS  (STANDING):  albuterol/ipratropium for Nebulization 3 milliLiter(s) Nebulizer every 12 hours  artificial  tears Solution 1 Drop(s) Left EYE four times a day  aspirin  chewable 81 milliGRAM(s) Enteral Tube daily  carvedilol 6.25 milliGRAM(s) Oral every 12 hours  chlorhexidine 0.12% Liquid 15 milliLiter(s) Oral Mucosa every 12 hours  chlorhexidine 2% Cloths 1 Application(s) Topical daily  dextrose 5%. 1000 milliLiter(s) (50 mL/Hr) IV Continuous <Continuous>  dextrose 5%. 1000 milliLiter(s) (100 mL/Hr) IV Continuous <Continuous>  dextrose 50% Injectable 25 Gram(s) IV Push once  dextrose 50% Injectable 12.5 Gram(s) IV Push once  dextrose 50% Injectable 25 Gram(s) IV Push once  doxazosin 2 milliGRAM(s) Oral at bedtime  ertapenem  IVPB 1000 milliGRAM(s) IV Intermittent every 24 hours  escitalopram Solution 10 milliGRAM(s) Oral daily  glucagon  Injectable 1 milliGRAM(s) IntraMuscular once  heparin   Injectable 5000 Unit(s) SubCutaneous every 8 hours  hydrALAZINE 25 milliGRAM(s) Oral every 8 hours  insulin glargine Injectable (LANTUS) 12 Unit(s) SubCutaneous <User Schedule>  insulin lispro (ADMELOG) corrective regimen sliding scale   SubCutaneous every 6 hours  levETIRAcetam  Solution 500 milliGRAM(s) Oral two times a day  melatonin 6 milliGRAM(s) Oral at bedtime  pantoprazole   Suspension 40 milliGRAM(s) Oral every 12 hours  petrolatum Ophthalmic Ointment 1 Application(s) Left EYE at bedtime  polyethylene glycol 3350 17 Gram(s) Oral daily  QUEtiapine 25 milliGRAM(s) Oral <User Schedule>  senna Syrup 10 milliLiter(s) Oral at bedtime  sucralfate suspension 1 Gram(s) Enteral Tube two times a day  ticagrelor 60 milliGRAM(s) Oral every 12 hours    MEDICATIONS  (PRN):  acetaminophen   Oral Liquid .. 650 milliGRAM(s) Oral every 6 hours PRN Temp greater or equal to 38C (100.4F), Mild Pain (1 - 3), Moderate Pain (4 - 6)  dextrose Oral Gel 15 Gram(s) Oral once PRN Blood Glucose LESS THAN 70 milliGRAM(s)/deciliter    PHYSICAL EXAM:  General: Laying in bed comfortably, NAD   Neck: (+) Trach tube noted, site c/d/i.  HEART: +s1, s2  LUNGS: +coarse breath sounds. No resp distress.   GI: +BS, soft, nt/nd, no peritoneal signs noted, +PEG, area c/d/i  Extremities: 1-2+ b/l pitting LE edema extending to below knee region.   NEURO: Awake, eyes open. PERRL. Nonverbal. Not following commands. Unresponsive.   Skin: as per RN assessment sheet     LABS:                        8.4    6.51  )-----------( 401      ( 03 Mar 2024 05:40 )             26.2     03-03    135  |  94<L>  |  36<H>  ----------------------------<  175<H>  4.0   |  30  |  1.20    Ca    8.3<L>      03 Mar 2024 06:35  Phos  3.3     03-03  Mg     2.20     03-03    Urinalysis Basic - ( 03 Mar 2024 06:35 )    Color: x / Appearance: x / SG: x / pH: x  Gluc: 175 mg/dL / Ketone: x  / Bili: x / Urobili: x   Blood: x / Protein: x / Nitrite: x   Leuk Esterase: x / RBC: x / WBC x   Sq Epi: x / Non Sq Epi: x / Bacteria: x    PAST MEDICAL & SURGICAL HISTORY:  Hyperlipemia    Hypertension    Coronary Artery Disease    Diabetes Mellitus Type II    Stented Coronary Artery  total 5 stents, last stent 5/2019    Neuropathy    Myocardial infarction    Stroke  mild left facial numbness   no other residuals verbalized    Myoclonic jerking    Stage 3 chronic kidney disease    History of Cataract Extraction    Hx of CABG    H/O coronary angiogram    S/P coronary artery stent placement  1/6/09    S/P placement of cardiac pacemaker    FAMILY HISTORY:  No pertinent family history in first degree relatives    Social History:  Lives with family  Ambulates with walker  Denies tobacco, EtOH, or illicit substance use (23 Dec 2023 22:51)    RADIOLOGY:  [ ] Reviewed and interpreted by me    PULMONARY FUNCTION TESTS:    EKG:

## 2024-03-03 NOTE — PROGRESS NOTE ADULT - SUBJECTIVE AND OBJECTIVE BOX
Patient seen and examined at bedside. Resting in bed. + Trach to vent      VITAL:  T(C): , Max: 36.8 (03-03-24 @ 00:00)  T(F): , Max: 98.2 (03-03-24 @ 00:00)  HR: 90 (03-03-24 @ 07:13)  BP: 136/69 (03-03-24 @ 05:13)  BP(mean): 93 (03-02-24 @ 18:39)  RR: 13 (03-03-24 @ 05:13)  SpO2: 96% (03-03-24 @ 07:13)  Wt(kg): --      PHYSICAL EXAM:  Constitutional: NAD trach to vent   HEENT: +trach  Respiratory: coarse bs   Cardiovascular: normal s1s2  Gastrointestinal: BS+, soft, NT/ND +PEG  Extremities:+ peripheral edema  :  + external catheter   Skin: No rashes      LABS:                        8.4    6.51  )-----------( 401      ( 03 Mar 2024 05:40 )             26.2     Na(135)/K(4.0)/Cl(94)/HCO3(30)/BUN(36)/Cr(1.20)Glu(175)/Ca(8.3)/Mg(2.20)/PO4(3.3)    03-03 @ 06:35  Na(135)/K(4.0)/Cl(94)/HCO3(28)/BUN(37)/Cr(1.33)Glu(187)/Ca(8.7)/Mg(2.20)/PO4(3.1)    03-02 @ 05:45  Na(138)/K(4.0)/Cl(98)/HCO3(30)/BUN(37)/Cr(1.39)Glu(159)/Ca(8.4)/Mg(2.20)/PO4(3.5)    03-01 @ 05:15    Urinalysis Basic - ( 03 Mar 2024 06:35 )    Color: x / Appearance: x / SG: x / pH: x  Gluc: 175 mg/dL / Ketone: x  / Bili: x / Urobili: x   Blood: x / Protein: x / Nitrite: x   Leuk Esterase: x / RBC: x / WBC x   Sq Epi: x / Non Sq Epi: x / Bacteria: x        < from: Xray Chest 1 View- PORTABLE-Urgent (Xray Chest 1 View- PORTABLE-Urgent .) (03.02.24 @ 13:57) >  IMPRESSION:  Mild interval progression of patient's pulmonary edema.    Mild interval increase in bilateral effusions, right greater than left.    --- End ofReport ---    < end of copied text >

## 2024-03-03 NOTE — PROGRESS NOTE ADULT - SUBJECTIVE AND OBJECTIVE BOX
Aashish Boyer MD  Interventional Cardiology / Endovascular Specialist  [place] Office : [address] [place] [address] Tel:   [place] Office : [address] [facility] [address] [address]  Tel: [phone]      Subjective/Overnight events: Patient lying in bed. No acute distress.   	  MEDICATIONS:  aspirin  chewable 81 milliGRAM(s) Enteral Tube daily  carvedilol 6.25 milliGRAM(s) Oral every 12 hours  doxazosin 2 milliGRAM(s) Oral at bedtime  heparin   Injectable 5000 Unit(s) SubCutaneous every 8 hours  hydrALAZINE 25 milliGRAM(s) Oral every 8 hours  ticagrelor 60 milliGRAM(s) Oral every 12 hours    ertapenem  IVPB 1000 milliGRAM(s) IV Intermittent every 24 hours    albuterol/ipratropium for Nebulization 3 milliLiter(s) Nebulizer every 12 hours    acetaminophen   Oral Liquid .. 650 milliGRAM(s) Oral every 6 hours PRN  escitalopram Solution 10 milliGRAM(s) Oral daily  levETIRAcetam  Solution 500 milliGRAM(s) Oral two times a day  LORazepam   Injectable 0.5 milliGRAM(s) IV Push every 6 hours PRN  melatonin 6 milliGRAM(s) Oral at bedtime    pantoprazole   Suspension 40 milliGRAM(s) Oral every 12 hours  polyethylene glycol 3350 17 Gram(s) Oral daily  senna Syrup 10 milliLiter(s) Oral at bedtime  sucralfate suspension 1 Gram(s) Enteral Tube two times a day    dextrose 50% Injectable 25 Gram(s) IV Push once  dextrose 50% Injectable 12.5 Gram(s) IV Push once  dextrose 50% Injectable 25 Gram(s) IV Push once  dextrose Oral Gel 15 Gram(s) Oral once PRN  glucagon  Injectable 1 milliGRAM(s) IntraMuscular once  insulin glargine Injectable (LANTUS) 12 Unit(s) SubCutaneous <User Schedule>  insulin lispro (ADMELOG) corrective regimen sliding scale   SubCutaneous every 6 hours    artificial  tears Solution 1 Drop(s) Left EYE four times a day  chlorhexidine 0.12% Liquid 15 milliLiter(s) Oral Mucosa every 12 hours  chlorhexidine 2% Cloths 1 Application(s) Topical daily  dextrose 5%. 1000 milliLiter(s) IV Continuous <Continuous>  dextrose 5%. 1000 milliLiter(s) IV Continuous <Continuous>  petrolatum Ophthalmic Ointment 1 Application(s) Left EYE at bedtime      PAST MEDICAL/SURGICAL HISTORY  PAST MEDICAL & SURGICAL HISTORY:  Hyperlipemia      Hypertension      Coronary Artery Disease      Diabetes Mellitus Type II      Stented Coronary Artery  total 5 stents, last stent 5/2019      Neuropathy      Myocardial infarction      Stroke  mild left facial numbness   no other residuals verbalized      Myoclonic jerking      Stage 3 chronic kidney disease      History of Cataract Extraction      Hx of CABG      H/O coronary angiogram      S/P coronary artery stent placement  1/6/09      S/P placement of cardiac pacemaker          SOCIAL HISTORY: Substance Use (street drugs): ( x ) never used  (  ) other:    FAMILY HISTORY:  No pertinent family history in first degree relatives            PHYSICAL EXAM:  T(C): 36.4 (03-03-24 @ 05:13), Max: 36.8 (03-03-24 @ 00:00)  HR: 95 (03-03-24 @ 11:48) (77 - 95)  BP: 136/69 (03-03-24 @ 05:13) (120/59 - 179/95)  RR: 13 (03-03-24 @ 05:13) (13 - 27)  SpO2: 96% (03-03-24 @ 11:48) (96% - 99%)  Wt(kg): --  I&O's Summary    02 Mar 2024 07:01  -  03 Mar 2024 07:00  --------------------------------------------------------  IN: 1800 mL / OUT: 920 mL / NET: 880 mL      GENERAL: NAD  EYES:  conjunctiva and sclera clear  ENMT: s/p trach  Cardiovascular: Normal S1 S2, No JVD, No murmurs, No edema  Respiratory: Lungs clear to auscultation	  Gastrointestinal:  Soft, s/p PEG  Extremities: No edema                                     8.4    6.51  )-----------( 401      ( 03 Mar 2024 05:40 )             26.2     03-03    135  |  94<L>  |  36<H>  ----------------------------<  175<H>  4.0   |  30  |  1.20    Ca    8.3<L>      03 Mar 2024 06:35  Phos  3.3     03-03  Mg     2.20     03-03      proBNP:   Lipid Profile:   HgA1c:   TSH:     Consultant(s) Notes Reviewed:  [x ] YES  [ ] NO    Care Discussed with Consultants/Other Providers [ x] YES  [ ] NO    Imaging Personally Reviewed independently:  [x] YES  [ ] NO    All labs, radiologic studies, vitals, orders and medications list reviewed. Patient is seen and examined at bedside. Case discussed with medical team.

## 2024-03-04 ENCOUNTER — RESULT REVIEW (OUTPATIENT)
Age: 89
End: 2024-03-04

## 2024-03-04 LAB
GLUCOSE BLDC GLUCOMTR-MCNC: 167 MG/DL — HIGH (ref 70–99)
GLUCOSE BLDC GLUCOMTR-MCNC: 186 MG/DL — HIGH (ref 70–99)
GLUCOSE BLDC GLUCOMTR-MCNC: 202 MG/DL — HIGH (ref 70–99)
GLUCOSE BLDC GLUCOMTR-MCNC: 216 MG/DL — HIGH (ref 70–99)

## 2024-03-04 PROCEDURE — 99233 SBSQ HOSP IP/OBS HIGH 50: CPT | Mod: FS

## 2024-03-04 PROCEDURE — 93321 DOPPLER ECHO F-UP/LMTD STD: CPT | Mod: 26

## 2024-03-04 PROCEDURE — 93308 TTE F-UP OR LMTD: CPT | Mod: 26

## 2024-03-04 RX ORDER — HYDRALAZINE HCL 50 MG
25 TABLET ORAL ONCE
Refills: 0 | Status: COMPLETED | OUTPATIENT
Start: 2024-03-04 | End: 2024-03-04

## 2024-03-04 RX ORDER — FUROSEMIDE 40 MG
40 TABLET ORAL ONCE
Refills: 0 | Status: COMPLETED | OUTPATIENT
Start: 2024-03-04 | End: 2024-03-04

## 2024-03-04 RX ORDER — HYDRALAZINE HCL 50 MG
50 TABLET ORAL EVERY 8 HOURS
Refills: 0 | Status: ACTIVE | OUTPATIENT
Start: 2024-03-04 | End: 2025-01-31

## 2024-03-04 RX ADMIN — TICAGRELOR 60 MILLIGRAM(S): 90 TABLET ORAL at 17:38

## 2024-03-04 RX ADMIN — CARVEDILOL PHOSPHATE 6.25 MILLIGRAM(S): 80 CAPSULE, EXTENDED RELEASE ORAL at 17:35

## 2024-03-04 RX ADMIN — POLYETHYLENE GLYCOL 3350 17 GRAM(S): 17 POWDER, FOR SOLUTION ORAL at 11:22

## 2024-03-04 RX ADMIN — PANTOPRAZOLE SODIUM 40 MILLIGRAM(S): 20 TABLET, DELAYED RELEASE ORAL at 17:37

## 2024-03-04 RX ADMIN — Medication 4: at 23:48

## 2024-03-04 RX ADMIN — HEPARIN SODIUM 5000 UNIT(S): 5000 INJECTION INTRAVENOUS; SUBCUTANEOUS at 13:27

## 2024-03-04 RX ADMIN — Medication 40 MILLIGRAM(S): at 13:05

## 2024-03-04 RX ADMIN — Medication 2: at 11:29

## 2024-03-04 RX ADMIN — CHLORHEXIDINE GLUCONATE 15 MILLILITER(S): 213 SOLUTION TOPICAL at 17:37

## 2024-03-04 RX ADMIN — HEPARIN SODIUM 5000 UNIT(S): 5000 INJECTION INTRAVENOUS; SUBCUTANEOUS at 21:31

## 2024-03-04 RX ADMIN — CHLORHEXIDINE GLUCONATE 1 APPLICATION(S): 213 SOLUTION TOPICAL at 11:23

## 2024-03-04 RX ADMIN — Medication 1 DROP(S): at 05:50

## 2024-03-04 RX ADMIN — Medication 3 MILLILITER(S): at 08:57

## 2024-03-04 RX ADMIN — Medication 50 MILLIGRAM(S): at 14:03

## 2024-03-04 RX ADMIN — TICAGRELOR 60 MILLIGRAM(S): 90 TABLET ORAL at 05:49

## 2024-03-04 RX ADMIN — Medication 2: at 05:49

## 2024-03-04 RX ADMIN — Medication 2 MILLIGRAM(S): at 21:31

## 2024-03-04 RX ADMIN — LEVETIRACETAM 500 MILLIGRAM(S): 250 TABLET, FILM COATED ORAL at 05:48

## 2024-03-04 RX ADMIN — Medication 6 MILLIGRAM(S): at 21:31

## 2024-03-04 RX ADMIN — Medication 1 GRAM(S): at 05:48

## 2024-03-04 RX ADMIN — Medication 3 MILLILITER(S): at 22:11

## 2024-03-04 RX ADMIN — CARVEDILOL PHOSPHATE 6.25 MILLIGRAM(S): 80 CAPSULE, EXTENDED RELEASE ORAL at 05:49

## 2024-03-04 RX ADMIN — Medication 1 DROP(S): at 11:20

## 2024-03-04 RX ADMIN — Medication 50 MILLIGRAM(S): at 21:31

## 2024-03-04 RX ADMIN — Medication 25 MILLIGRAM(S): at 11:19

## 2024-03-04 RX ADMIN — CHLORHEXIDINE GLUCONATE 15 MILLILITER(S): 213 SOLUTION TOPICAL at 05:50

## 2024-03-04 RX ADMIN — Medication 81 MILLIGRAM(S): at 11:22

## 2024-03-04 RX ADMIN — PANTOPRAZOLE SODIUM 40 MILLIGRAM(S): 20 TABLET, DELAYED RELEASE ORAL at 05:49

## 2024-03-04 RX ADMIN — LEVETIRACETAM 500 MILLIGRAM(S): 250 TABLET, FILM COATED ORAL at 17:38

## 2024-03-04 RX ADMIN — Medication 0.5 MILLIGRAM(S): at 21:30

## 2024-03-04 RX ADMIN — INSULIN GLARGINE 12 UNIT(S): 100 INJECTION, SOLUTION SUBCUTANEOUS at 11:29

## 2024-03-04 RX ADMIN — ERTAPENEM SODIUM 120 MILLIGRAM(S): 1 INJECTION, POWDER, LYOPHILIZED, FOR SOLUTION INTRAMUSCULAR; INTRAVENOUS at 13:26

## 2024-03-04 RX ADMIN — Medication 1 DROP(S): at 17:35

## 2024-03-04 RX ADMIN — ESCITALOPRAM OXALATE 10 MILLIGRAM(S): 10 TABLET, FILM COATED ORAL at 11:19

## 2024-03-04 RX ADMIN — Medication 25 MILLIGRAM(S): at 05:48

## 2024-03-04 RX ADMIN — HEPARIN SODIUM 5000 UNIT(S): 5000 INJECTION INTRAVENOUS; SUBCUTANEOUS at 05:48

## 2024-03-04 RX ADMIN — Medication 4: at 17:38

## 2024-03-04 RX ADMIN — Medication 1 GRAM(S): at 17:37

## 2024-03-04 RX ADMIN — Medication 1 DROP(S): at 23:48

## 2024-03-04 NOTE — CHART NOTE - NSCHARTNOTEFT_GEN_A_CORE
RCU PA NOTE     QTC corrected from ECG 3/3 448ms using bazzett formula   Will discuss resuming seroquel and using haldol PRN   Will continue to monitor patient     ALISTAIR Thomas, PA-C  Department of Medicine/ RCU  In house [facility]CU [facility] [id number]  In house Medicine [facility] [id number]  Reachable via teams

## 2024-03-04 NOTE — PROGRESS NOTE ADULT - NS ATTEND AMEND GEN_ALL_CORE FT
agree with above  did well with the low dose ativan yesterday  will also ask cardiology to review EKG  complete course of abx 3/6  TTE done this am, follow up  weaning as tolerated  continue EC with asher for now    olive at bedside

## 2024-03-04 NOTE — PROGRESS NOTE ADULT - SUBJECTIVE AND OBJECTIVE BOX
Aashish Boyer MD  Interventional Cardiology / Advance Heart Failure and Cardiac Transplant Specialist  [place] Office : [address] [place] [address]  Tel:   [place] Office : [address] [facility] [address]  Tel: [phone]      Subjective/Overnight events: Pt is lying in bed in RCU   	  MEDICATIONS:  aspirin  chewable 81 milliGRAM(s) Enteral Tube daily  carvedilol 6.25 milliGRAM(s) Oral every 12 hours  doxazosin 2 milliGRAM(s) Oral at bedtime  heparin   Injectable 5000 Unit(s) SubCutaneous every 8 hours  hydrALAZINE 50 milliGRAM(s) Oral every 8 hours  ticagrelor 60 milliGRAM(s) Oral every 12 hours    ertapenem  IVPB 1000 milliGRAM(s) IV Intermittent every 24 hours    albuterol/ipratropium for Nebulization 3 milliLiter(s) Nebulizer every 12 hours    acetaminophen   Oral Liquid .. 650 milliGRAM(s) Oral every 6 hours PRN  escitalopram Solution 10 milliGRAM(s) Oral daily  levETIRAcetam  Solution 500 milliGRAM(s) Oral two times a day  LORazepam   Injectable 0.5 milliGRAM(s) IV Push every 6 hours PRN  melatonin 6 milliGRAM(s) Oral at bedtime    pantoprazole   Suspension 40 milliGRAM(s) Oral every 12 hours  polyethylene glycol 3350 17 Gram(s) Oral daily  senna Syrup 10 milliLiter(s) Oral at bedtime  sucralfate suspension 1 Gram(s) Enteral Tube two times a day    dextrose 50% Injectable 25 Gram(s) IV Push once  dextrose 50% Injectable 25 Gram(s) IV Push once  dextrose 50% Injectable 12.5 Gram(s) IV Push once  dextrose Oral Gel 15 Gram(s) Oral once PRN  glucagon  Injectable 1 milliGRAM(s) IntraMuscular once  insulin glargine Injectable (LANTUS) 12 Unit(s) SubCutaneous <User Schedule>  insulin lispro (ADMELOG) corrective regimen sliding scale   SubCutaneous every 6 hours    artificial  tears Solution 1 Drop(s) Left EYE four times a day  chlorhexidine 0.12% Liquid 15 milliLiter(s) Oral Mucosa every 12 hours  chlorhexidine 2% Cloths 1 Application(s) Topical daily  dextrose 5%. 1000 milliLiter(s) IV Continuous <Continuous>  dextrose 5%. 1000 milliLiter(s) IV Continuous <Continuous>  petrolatum Ophthalmic Ointment 1 Application(s) Left EYE at bedtime      PAST MEDICAL/SURGICAL HISTORY  PAST MEDICAL & SURGICAL HISTORY:  Hyperlipemia      Hypertension      Coronary Artery Disease      Diabetes Mellitus Type II      Stented Coronary Artery  total 5 stents, last stent 5/2019      Neuropathy      Myocardial infarction      Stroke  mild left facial numbness   no other residuals verbalized      Myoclonic jerking      Stage 3 chronic kidney disease      History of Cataract Extraction      Hx of CABG      H/O coronary angiogram      S/P coronary artery stent placement  1/6/09      S/P placement of cardiac pacemaker          SOCIAL HISTORY: Substance Use (street drugs): ( x ) never used  (  ) other:    FAMILY HISTORY:  No pertinent family history in first degree relatives          PHYSICAL EXAM:  T(C): 36.1 (03-04-24 @ 08:00), Max: 37.1 (03-04-24 @ 04:00)  HR: 88 (03-04-24 @ 11:09) (82 - 88)  BP: 148/77 (03-04-24 @ 08:00) (122/61 - 148/77)  RR: 17 (03-04-24 @ 08:00) (16 - 18)  SpO2: 97% (03-04-24 @ 11:09) (97% - 100%)  Wt(kg): --  I&O's Summary    03 Mar 2024 07:01  -  04 Mar 2024 07:00  --------------------------------------------------------  IN: 2200 mL / OUT: 2625 mL / NET: -425 mL        GENERAL: NAD  EYES:  conjunctiva and sclera clear  ENMT: s/p trach  Cardiovascular: Normal S1 S2, No JVD, No murmurs, No edema  Respiratory: Lungs clear to auscultation	  Gastrointestinal:  Soft, s/p PEG  Extremities: + LE edema                                       8.4    6.51  )-----------( 401      ( 03 Mar 2024 05:40 )             26.2     03-03    135  |  94<L>  |  36<H>  ----------------------------<  175<H>  4.0   |  30  |  1.20    Ca    8.3<L>      03 Mar 2024 06:35  Phos  3.3     03-03  Mg     2.20     03-03      proBNP:   Lipid Profile:   HgA1c:   TSH:     Consultant(s) Notes Reviewed:  [x ] YES  [ ] NO    Care Discussed with Consultants/Other Providers [ x] YES  [ ] NO    Imaging Personally Reviewed independently:  [x] YES  [ ] NO    All labs, radiologic studies, vitals, orders and medications list reviewed. Patient is seen and examined at bedside. Case discussed with medical team.

## 2024-03-04 NOTE — PROGRESS NOTE ADULT - SUBJECTIVE AND OBJECTIVE BOX
NEPHROLOGY-[facility] [phone]        Patient seen and examined trach to vent now on 1:1 for agitation. No labs today.         MEDICATIONS  (STANDING):  albuterol/ipratropium for Nebulization 3 milliLiter(s) Nebulizer every 12 hours  artificial  tears Solution 1 Drop(s) Left EYE four times a day  aspirin  chewable 81 milliGRAM(s) Enteral Tube daily  carvedilol 6.25 milliGRAM(s) Oral every 12 hours  chlorhexidine 0.12% Liquid 15 milliLiter(s) Oral Mucosa every 12 hours  chlorhexidine 2% Cloths 1 Application(s) Topical daily  dextrose 5%. 1000 milliLiter(s) (100 mL/Hr) IV Continuous <Continuous>  dextrose 5%. 1000 milliLiter(s) (50 mL/Hr) IV Continuous <Continuous>  dextrose 50% Injectable 25 Gram(s) IV Push once  dextrose 50% Injectable 25 Gram(s) IV Push once  dextrose 50% Injectable 12.5 Gram(s) IV Push once  doxazosin 2 milliGRAM(s) Oral at bedtime  ertapenem  IVPB 1000 milliGRAM(s) IV Intermittent every 24 hours  escitalopram Solution 10 milliGRAM(s) Oral daily  glucagon  Injectable 1 milliGRAM(s) IntraMuscular once  heparin   Injectable 5000 Unit(s) SubCutaneous every 8 hours  hydrALAZINE 50 milliGRAM(s) Oral every 8 hours  insulin glargine Injectable (LANTUS) 12 Unit(s) SubCutaneous <User Schedule>  insulin lispro (ADMELOG) corrective regimen sliding scale   SubCutaneous every 6 hours  levETIRAcetam  Solution 500 milliGRAM(s) Oral two times a day  melatonin 6 milliGRAM(s) Oral at bedtime  pantoprazole   Suspension 40 milliGRAM(s) Oral every 12 hours  petrolatum Ophthalmic Ointment 1 Application(s) Left EYE at bedtime  polyethylene glycol 3350 17 Gram(s) Oral daily  senna Syrup 10 milliLiter(s) Oral at bedtime  sucralfate suspension 1 Gram(s) Enteral Tube two times a day  ticagrelor 60 milliGRAM(s) Oral every 12 hours      VITAL:  T(C): , Max: 37.1 (03-04-24 @ 04:00)  T(F): , Max: 98.7 (03-04-24 @ 04:00)  HR: 88 (03-04-24 @ 11:09)  BP: 148/77 (03-04-24 @ 08:00)  BP(mean): 98 (03-04-24 @ 08:00)  RR: 17 (03-04-24 @ 08:00)  SpO2: 97% (03-04-24 @ 11:09)  Wt(kg): --    I and O's:    03-03 @ 07:01  -  03-04 @ 07:00  --------------------------------------------------------  IN: 2200 mL / OUT: 2625 mL / NET: -425 mL          PHYSICAL EXAM:  Constitutional: trach to vent   HEENT: +trach  Respiratory: coarse bs   Cardiovascular: normal s1s2  Gastrointestinal: BS+, soft, NT/ND +PEG  Extremities:+ peripheral edema  :  + external catheter   Skin: No rashes    LABS:                        8.4    6.51  )-----------( 401      ( 03 Mar 2024 05:40 )             26.2     03-03    135  |  94<L>  |  36<H>  ----------------------------<  175<H>  4.0   |  30  |  1.20    Ca    8.3<L>      03 Mar 2024 06:35  Phos  3.3     03-03  Mg     2.20     03-03            Urine Studies:  Urinalysis Basic - ( 03 Mar 2024 06:35 )    Color: x / Appearance: x / SG: x / pH: x  Gluc: 175 mg/dL / Ketone: x  / Bili: x / Urobili: x   Blood: x / Protein: x / Nitrite: x   Leuk Esterase: x / RBC: x / WBC x   Sq Epi: x / Non Sq Epi: x / Bacteria: x    IMPRESSION:  89M w/ DM2, HTN, CVA, PAD, CKD3, and CAD-CABG, 12/23/23 p/w COVID19 infection, c/b respiratory failure/hypotension; now s/p tracheostomy    (1)Renal - CKD3; superimposed mild prerenal azotemia - numbers fluctuating based on hemodynamic status  (2)CV - now off pressors and midodrine, BP improved/stable    (3)Pulm - vent-dependent   (4)ID - afebrile, s/p zosyn   (5)GI - s/p PEG (2/20)  (6)Hypernatremia - possibly due to diuresis, NA+ improved     RECOMMEND:   (1)cont lasix prn for hypervolemia   (2)flush PEG as needed   (3)Continue meds for GFR 30-40ml/min      Wendi Alvarenga NP  [facility]  Office: [phone]       NEPHROLOGY-[facility] [phone]        Patient seen and examined trach to vent now on 1:1 for agitation. No labs today.         MEDICATIONS  (STANDING):  albuterol/ipratropium for Nebulization 3 milliLiter(s) Nebulizer every 12 hours  artificial  tears Solution 1 Drop(s) Left EYE four times a day  aspirin  chewable 81 milliGRAM(s) Enteral Tube daily  carvedilol 6.25 milliGRAM(s) Oral every 12 hours  chlorhexidine 0.12% Liquid 15 milliLiter(s) Oral Mucosa every 12 hours  chlorhexidine 2% Cloths 1 Application(s) Topical daily  dextrose 5%. 1000 milliLiter(s) (100 mL/Hr) IV Continuous <Continuous>  dextrose 5%. 1000 milliLiter(s) (50 mL/Hr) IV Continuous <Continuous>  dextrose 50% Injectable 25 Gram(s) IV Push once  dextrose 50% Injectable 25 Gram(s) IV Push once  dextrose 50% Injectable 12.5 Gram(s) IV Push once  doxazosin 2 milliGRAM(s) Oral at bedtime  ertapenem  IVPB 1000 milliGRAM(s) IV Intermittent every 24 hours  escitalopram Solution 10 milliGRAM(s) Oral daily  glucagon  Injectable 1 milliGRAM(s) IntraMuscular once  heparin   Injectable 5000 Unit(s) SubCutaneous every 8 hours  hydrALAZINE 50 milliGRAM(s) Oral every 8 hours  insulin glargine Injectable (LANTUS) 12 Unit(s) SubCutaneous <User Schedule>  insulin lispro (ADMELOG) corrective regimen sliding scale   SubCutaneous every 6 hours  levETIRAcetam  Solution 500 milliGRAM(s) Oral two times a day  melatonin 6 milliGRAM(s) Oral at bedtime  pantoprazole   Suspension 40 milliGRAM(s) Oral every 12 hours  petrolatum Ophthalmic Ointment 1 Application(s) Left EYE at bedtime  polyethylene glycol 3350 17 Gram(s) Oral daily  senna Syrup 10 milliLiter(s) Oral at bedtime  sucralfate suspension 1 Gram(s) Enteral Tube two times a day  ticagrelor 60 milliGRAM(s) Oral every 12 hours      VITAL:  T(C): , Max: 37.1 (03-04-24 @ 04:00)  T(F): , Max: 98.7 (03-04-24 @ 04:00)  HR: 88 (03-04-24 @ 11:09)  BP: 148/77 (03-04-24 @ 08:00)  BP(mean): 98 (03-04-24 @ 08:00)  RR: 17 (03-04-24 @ 08:00)  SpO2: 97% (03-04-24 @ 11:09)  Wt(kg): --    I and O's:    03-03 @ 07:01  -  03-04 @ 07:00  --------------------------------------------------------  IN: 2200 mL / OUT: 2625 mL / NET: -425 mL          PHYSICAL EXAM:  Constitutional: trach to vent   HEENT: +trach  Respiratory: coarse bs   Cardiovascular: normal s1s2  Gastrointestinal: BS+, soft, NT/ND +PEG  Extremities:+ peripheral edema  :  + external catheter   Skin: No rashes    LABS:                        8.4    6.51  )-----------( 401      ( 03 Mar 2024 05:40 )             26.2     03-03    135  |  94<L>  |  36<H>  ----------------------------<  175<H>  4.0   |  30  |  1.20    Ca    8.3<L>      03 Mar 2024 06:35  Phos  3.3     03-03  Mg     2.20     03-03            Urine Studies:  Urinalysis Basic - ( 03 Mar 2024 06:35 )    Color: x / Appearance: x / SG: x / pH: x  Gluc: 175 mg/dL / Ketone: x  / Bili: x / Urobili: x   Blood: x / Protein: x / Nitrite: x   Leuk Esterase: x / RBC: x / WBC x   Sq Epi: x / Non Sq Epi: x / Bacteria: x    IMPRESSION:  89M w/ DM2, HTN, CVA, PAD, CKD3, and CAD-CABG, 12/23/23 p/w COVID19 infection, c/b respiratory failure/hypotension; now s/p tracheostomy    (1)Renal - CKD3; superimposed mild prerenal azotemia - numbers fluctuating based on hemodynamic status  (2)CV - now off pressors and midodrine, BP improved/stable    (3)Pulm - vent-dependent   (4)ID - afebrile, s/p zosyn   (5)GI - s/p PEG (2/20)  (6)Hypernatremia - possibly due to diuresis, NA+ improved     RECOMMEND:   (1)cont lasix prn for hypervolemia   (2)flush PEG as needed   (3)Continue meds for GFR 30-40ml/min      Wendi Alvarenga NP  [facility]  Office: [phone]      RENAL ATTENDING NOTE  Patient seen and examined with NP. Agree with assessment and plan as above.    Davey Chacko MD  [facility]  [phone]

## 2024-03-04 NOTE — PROGRESS NOTE ADULT - ASSESSMENT
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     EKG SR RBBB (old per family)     1) Hypoxia   - s/p bronch   - Rt pleural effusion   - held lasix given Hypernatremia and worsening creat , CXR with cephalization, given a does of IV lasix monitor u/o renal function    - f/u neuro recs MRI brain shows no acute disease  - s/p trach and PEG  -corrected QTc on         2) CAD s/p CABG  - p/w chest pain. EKG non ischemic , trop -sera   - chest pains  Trop mildly elevated and trending down likely sec to kidney disease,   - holding brilinta, was on it for SMA stent placed in 12/2023, held 2/2 anticipation for PEG placement, restart when no further invasive procedures planned  -TTE 2/12 with  mod decrease LV systolic function, new.  will medically manage likely stress induced. c/w IV lasix 40mg daily    3) Covid +  - s/p remdesivir   - c/w supportive management   - t/t per primary team   -resolved    4) Abd distension   -CTA AP obtained which showed  partially occlusive calcified and non-calcified plaque just distal to take-off of the SMA, with estimated at least 75% luminal narrowing. Limited evaluation of its distal branches. 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   -on Protonix IV q 12      6) Afib  -hx of PAF  -no AC 2/2 GIB  -on coreg 6.25mg BID

## 2024-03-04 NOTE — CHART NOTE - NSCHARTNOTEFT_GEN_A_CORE
RCU PA NOTE     Called by RN for patient with SBP 160s without agitation or discomfort   Known HFrEF and recent CXR with increased pulmonary edema   Concern for volume overload and lasix 40mg IVP x 1 dose given and hydral increased to 50   Will continue to monitor patient     ALISTAIR Thomas, PA-C  Department of Medicine/ RCU  In house RCU [facility] [phone]  In house Medicine [facility] [id number]  Reachable via teams

## 2024-03-04 NOTE — PROGRESS NOTE ADULT - SUBJECTIVE AND OBJECTIVE BOX
CHIEF COMPLAINT: Patient is a 89y old  Male who presents with a chief complaint of COVID19, Chest pain (03 Mar 2024 12:36)      INTERVAL EVENTS:  - No interval events overnight     REVIEW OF SYSTEMS: Seen by bedside during AM rounds and unable to assess ROS as language barrier/ vented     Mode: AC/ CMV (Assist Control/ Continuous Mandatory Ventilation), RR (machine): 12, TV (machine): 400, FiO2: 30, PEEP: 5, ITime: 0.72, MAP: 12, PIP: 34      OBJECTIVE:  ICU Vital Signs Last 24 Hrs  T(C): 36.8 (04 Mar 2024 05:11), Max: 37.1 (04 Mar 2024 04:00)  T(F): 98.3 (04 Mar 2024 05:11), Max: 98.7 (04 Mar 2024 04:00)  HR: 85 (04 Mar 2024 05:11) (82 - 95)  BP: 140/71 (04 Mar 2024 05:11) (119/98 - 142/74)  BP(mean): --  ABP: --  ABP(mean): --  RR: 17 (04 Mar 2024 05:11) (16 - 18)  SpO2: 98% (04 Mar 2024 05:11) (96% - 100%)    O2 Parameters below as of 04 Mar 2024 05:11  Patient On (Oxygen Delivery Method): ventilator          Mode: AC/ CMV (Assist Control/ Continuous Mandatory Ventilation), RR (machine): 12, TV (machine): 400, FiO2: 30, PEEP: 5, ITime: 0.72, MAP: 12, PIP: 34    03-03 @ 07:01  -  03-04 @ 07:00  --------------------------------------------------------  IN: 2200 mL / OUT: 2625 mL / NET: -425 mL      CAPILLARY BLOOD GLUCOSE  POCT Blood Glucose.: 167 mg/dL (04 Mar 2024 05:44)      HOSPITAL MEDICATIONS:  MEDICATIONS  (STANDING):  albuterol/ipratropium for Nebulization 3 milliLiter(s) Nebulizer every 12 hours  artificial  tears Solution 1 Drop(s) Left EYE four times a day  aspirin  chewable 81 milliGRAM(s) Enteral Tube daily  carvedilol 6.25 milliGRAM(s) Oral every 12 hours  chlorhexidine 0.12% Liquid 15 milliLiter(s) Oral Mucosa every 12 hours  chlorhexidine 2% Cloths 1 Application(s) Topical daily  dextrose 5%. 1000 milliLiter(s) (100 mL/Hr) IV Continuous <Continuous>  dextrose 5%. 1000 milliLiter(s) (50 mL/Hr) IV Continuous <Continuous>  dextrose 50% Injectable 25 Gram(s) IV Push once  dextrose 50% Injectable 12.5 Gram(s) IV Push once  dextrose 50% Injectable 25 Gram(s) IV Push once  doxazosin 2 milliGRAM(s) Oral at bedtime  ertapenem  IVPB 1000 milliGRAM(s) IV Intermittent every 24 hours  escitalopram Solution 10 milliGRAM(s) Oral daily  glucagon  Injectable 1 milliGRAM(s) IntraMuscular once  heparin   Injectable 5000 Unit(s) SubCutaneous every 8 hours  hydrALAZINE 25 milliGRAM(s) Oral every 8 hours  insulin glargine Injectable (LANTUS) 12 Unit(s) SubCutaneous <User Schedule>  insulin lispro (ADMELOG) corrective regimen sliding scale   SubCutaneous every 6 hours  levETIRAcetam  Solution 500 milliGRAM(s) Oral two times a day  melatonin 6 milliGRAM(s) Oral at bedtime  pantoprazole   Suspension 40 milliGRAM(s) Oral every 12 hours  petrolatum Ophthalmic Ointment 1 Application(s) Left EYE at bedtime  polyethylene glycol 3350 17 Gram(s) Oral daily  senna Syrup 10 milliLiter(s) Oral at bedtime  sucralfate suspension 1 Gram(s) Enteral Tube two times a day  ticagrelor 60 milliGRAM(s) Oral every 12 hours    MEDICATIONS  (PRN):  acetaminophen   Oral Liquid .. 650 milliGRAM(s) Oral every 6 hours PRN Temp greater or equal to 38C (100.4F), Mild Pain (1 - 3), Moderate Pain (4 - 6)  dextrose Oral Gel 15 Gram(s) Oral once PRN Blood Glucose LESS THAN 70 milliGRAM(s)/deciliter      PHYSICAL EXAMINATION  General: NAD   HEENT: Trach present   Cards: S1/S2, no murmurs   Pulm: Course vent sounds bilaterally with diminished bases (R>L). No wheezes.   Abdomen: Soft, nondistended and nontender. BS (+) PEG present.   Extremities: Mild pedal edema bilaterally however improving. GEORGE of BL upper > lower extremities when spoken to in native language.  Neurology: Awake with eyes open and intermittently follows commands when spoken to in native language but limited by weakness with no acute focal neurological deficits    LABS:                        8.4    6.51  )-----------( 401      ( 03 Mar 2024 05:40 )             26.2     03-03    135  |  94<L>  |  36<H>  ----------------------------<  175<H>  4.0   |  30  |  1.20    Ca    8.3<L>      03 Mar 2024 06:35  Phos  3.3     03-03  Mg     2.20     03-03        Urinalysis Basic - ( 03 Mar 2024 06:35 )  Color: x / Appearance: x / SG: x / pH: x  Gluc: 175 mg/dL / Ketone: x  / Bili: x / Urobili: x   Blood: x / Protein: x / Nitrite: x   Leuk Esterase: x / RBC: x / WBC x   Sq Epi: x / Non Sq Epi: x / Bacteria: x            PAST MEDICAL & SURGICAL HISTORY:  Hyperlipemia      Hypertension      Coronary Artery Disease      Diabetes Mellitus Type II      Stented Coronary Artery  total 5 stents, last stent 5/2019      Neuropathy      Myocardial infarction      Stroke  mild left facial numbness   no other residuals verbalized      Myoclonic jerking      Stage 3 chronic kidney disease      History of Cataract Extraction      Hx of CABG      H/O coronary angiogram      S/P coronary artery stent placement  1/6/09      S/P placement of cardiac pacemaker          FAMILY HISTORY:  No pertinent family history in first degree relatives        Social History:  Lives with family  Ambulates with walker  Denies tobacco, EtOH, or illicit substance use (23 Dec 2023 22:51)      RADIOLOGY:  [ ] Reviewed and interpreted by me    PULMONARY FUNCTION TESTS:    EKG: CHIEF COMPLAINT: Patient is a 89y old  Male who presents with a chief complaint of COVID19, Chest pain (03 Mar 2024 12:36)      INTERVAL EVENTS:  - No interval events overnight   - -170 and hydral up to 50 and lasix 40 IVP given   - QTC prolonged but correct normal. Hold QTC prolonging agents for now and ativan PRN     REVIEW OF SYSTEMS: Seen by bedside during AM rounds and unable to assess ROS as language barrier/ vented     Mode: AC/ CMV (Assist Control/ Continuous Mandatory Ventilation), RR (machine): 12, TV (machine): 400, FiO2: 30, PEEP: 5, ITime: 0.72, MAP: 12, PIP: 34      OBJECTIVE:  ICU Vital Signs Last 24 Hrs  T(C): 36.8 (04 Mar 2024 05:11), Max: 37.1 (04 Mar 2024 04:00)  T(F): 98.3 (04 Mar 2024 05:11), Max: 98.7 (04 Mar 2024 04:00)  HR: 85 (04 Mar 2024 05:11) (82 - 95)  BP: 140/71 (04 Mar 2024 05:11) (119/98 - 142/74)  BP(mean): --  ABP: --  ABP(mean): --  RR: 17 (04 Mar 2024 05:11) (16 - 18)  SpO2: 98% (04 Mar 2024 05:11) (96% - 100%)    O2 Parameters below as of 04 Mar 2024 05:11  Patient On (Oxygen Delivery Method): ventilator          Mode: AC/ CMV (Assist Control/ Continuous Mandatory Ventilation), RR (machine): 12, TV (machine): 400, FiO2: 30, PEEP: 5, ITime: 0.72, MAP: 12, PIP: 34    03-03 @ 07:01  -  03-04 @ 07:00  --------------------------------------------------------  IN: 2200 mL / OUT: 2625 mL / NET: -425 mL      CAPILLARY BLOOD GLUCOSE  POCT Blood Glucose.: 167 mg/dL (04 Mar 2024 05:44)      HOSPITAL MEDICATIONS:  MEDICATIONS  (STANDING):  albuterol/ipratropium for Nebulization 3 milliLiter(s) Nebulizer every 12 hours  artificial  tears Solution 1 Drop(s) Left EYE four times a day  aspirin  chewable 81 milliGRAM(s) Enteral Tube daily  carvedilol 6.25 milliGRAM(s) Oral every 12 hours  chlorhexidine 0.12% Liquid 15 milliLiter(s) Oral Mucosa every 12 hours  chlorhexidine 2% Cloths 1 Application(s) Topical daily  dextrose 5%. 1000 milliLiter(s) (100 mL/Hr) IV Continuous <Continuous>  dextrose 5%. 1000 milliLiter(s) (50 mL/Hr) IV Continuous <Continuous>  dextrose 50% Injectable 25 Gram(s) IV Push once  dextrose 50% Injectable 12.5 Gram(s) IV Push once  dextrose 50% Injectable 25 Gram(s) IV Push once  doxazosin 2 milliGRAM(s) Oral at bedtime  ertapenem  IVPB 1000 milliGRAM(s) IV Intermittent every 24 hours  escitalopram Solution 10 milliGRAM(s) Oral daily  glucagon  Injectable 1 milliGRAM(s) IntraMuscular once  heparin   Injectable 5000 Unit(s) SubCutaneous every 8 hours  hydrALAZINE 25 milliGRAM(s) Oral every 8 hours  insulin glargine Injectable (LANTUS) 12 Unit(s) SubCutaneous <User Schedule>  insulin lispro (ADMELOG) corrective regimen sliding scale   SubCutaneous every 6 hours  levETIRAcetam  Solution 500 milliGRAM(s) Oral two times a day  melatonin 6 milliGRAM(s) Oral at bedtime  pantoprazole   Suspension 40 milliGRAM(s) Oral every 12 hours  petrolatum Ophthalmic Ointment 1 Application(s) Left EYE at bedtime  polyethylene glycol 3350 17 Gram(s) Oral daily  senna Syrup 10 milliLiter(s) Oral at bedtime  sucralfate suspension 1 Gram(s) Enteral Tube two times a day  ticagrelor 60 milliGRAM(s) Oral every 12 hours    MEDICATIONS  (PRN):  acetaminophen   Oral Liquid .. 650 milliGRAM(s) Oral every 6 hours PRN Temp greater or equal to 38C (100.4F), Mild Pain (1 - 3), Moderate Pain (4 - 6)  dextrose Oral Gel 15 Gram(s) Oral once PRN Blood Glucose LESS THAN 70 milliGRAM(s)/deciliter      PHYSICAL EXAMINATION  General: NAD   HEENT: Trach present   Cards: S1/S2, no murmurs   Pulm: Course vent sounds bilaterally with diminished bases (R>L) increased. No wheezes.   Abdomen: Soft, nondistended and nontender. BS (+) PEG present.   Extremities: Mild pedal edema bilaterally however improving. GEORGE of BL upper > lower extremities when spoken to in native language.  Neurology: Awake with eyes open and intermittently follows commands when spoken to in native language but limited by weakness with no acute focal neurological deficits    LABS:                        8.4    6.51  )-----------( 401      ( 03 Mar 2024 05:40 )             26.2     03-03    135  |  94<L>  |  36<H>  ----------------------------<  175<H>  4.0   |  30  |  1.20    Ca    8.3<L>      03 Mar 2024 06:35  Phos  3.3     03-03  Mg     2.20     03-03        Urinalysis Basic - ( 03 Mar 2024 06:35 )  Color: x / Appearance: x / SG: x / pH: x  Gluc: 175 mg/dL / Ketone: x  / Bili: x / Urobili: x   Blood: x / Protein: x / Nitrite: x   Leuk Esterase: x / RBC: x / WBC x   Sq Epi: x / Non Sq Epi: x / Bacteria: x            PAST MEDICAL & SURGICAL HISTORY:  Hyperlipemia      Hypertension      Coronary Artery Disease      Diabetes Mellitus Type II      Stented Coronary Artery  total 5 stents, last stent 5/2019      Neuropathy      Myocardial infarction      Stroke  mild left facial numbness   no other residuals verbalized      Myoclonic jerking      Stage 3 chronic kidney disease      History of Cataract Extraction      Hx of CABG      H/O coronary angiogram      S/P coronary artery stent placement  1/6/09      S/P placement of cardiac pacemaker          FAMILY HISTORY:  No pertinent family history in first degree relatives        Social History:  Lives with family  Ambulates with walker  Denies tobacco, EtOH, or illicit substance use (23 Dec 2023 22:51)      RADIOLOGY:  [ ] Reviewed and interpreted by me    PULMONARY FUNCTION TESTS:    EKG:

## 2024-03-04 NOTE — PROGRESS NOTE ADULT - ASSESSMENT
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 Course ultimately complicated by progressive WOB and O2 demand and patient intubated and transferred to MICU (1/22). Course further complicated by acute cholecystitis and s/p Perc Lynsey with IR on (1/26), HFrEF 38, pulmonary edema, superimposed PNA, failed extubation failed and prolonged vent time and s/p trach (2/13). Patient transferred to RCU (2/16) and s/p PEG (2/20)     NEUROLOGY   # Encephalopathy   - Hx of CVA with no residual deficits per family, however per family worsening confusion at home over the past 6 months (becoming disoriented to time) but prior to admission has been more confused, talking about seeing people that are not present.  - CTH (1/31) with no evidence of acute infarct  - Continue on ASA and Brilinta  - Holding Neurontin, Memantine and Oxycodone in setting of somnolence    # ICA stenosis   - CTA NECK (1/31) with moderate to severe narrowing left internal carotid at the origin. Severe narrowing right internal carotid by a tandem lesion 1.5 cm above the bifurcation with a narrowed internal carotid beyond the lesion. Extensive calcified plaque both cavernous and supraclinoid carotid arteries with narrowing but no occlusion. Mild narrowing proximal right M1 segment. Moderate narrowing both vertebral V4 segments. No perfusion abnormality at the Tmax 6 second threshold, but moderate hypoperfusion in the right MCA territory at the 4 second threshold.   - Continue on ASA and Brilinta    # Myoclonic jerks   - Hx of myoclonic jerks post CVAs   - EEG (1/18-2/6) negative for seizures  - Continue on Keppra and per neuro wishes to wean, however family wishes to keep current dose as home medication.     # Depression/ Insomnia  - Continue on Lexapro per home medication   - Insomnic per family and melatonin and Seroquel 25 QHS (increased 3/2 and QTC 490ms corrected)  - QTC on (3/3) 634, Seroquel d'dangelo, and started on 0.5 mg q6 IV Ativan PRN for agitation   - Supportive care     CARDIOLOGY   # Vasoplegic vs septic shock  # Hx of HTN   - Weaned off pressors in ICU and now with mild hypertension   - Continue on lopressor as below and monitor HR     # AFIB RVR   # Bifascicular Block with [employer] PPM   - AFIB RVR episodes and PPM interrogated (2/20) by EP with similar episodes  - Previous admission in 6/2022 with cardiogenic syncope second to bifasicular block, however now with PPM and AV myron blockers ok.   - Continue on lopressor 12.5mg BID however replaced with coreg 6.25 second to HFrEF   - AC discussed at length however with recent GIB will hold for now     # HFrEF 38 likely stress induced?   # Mild to moderate AS   - ECHO (12/2023) with EF 62 with normal LVSF and mild to moderate AS   - ECHO (2/2024) with EF 38, moderate LVSD with global LV hypokinesis, mildly reduced RVSF (TAPSE 1.3), mild to moderate MR, mild AR, and moderate AS.   - Continue on coreg 6.25 and hydralazine  25  - Hold isordil and up titrate above medications first   - s/p diuresis by dose (Lasix 40mg IVP given 3/1)  - Given moderate AS monitor BP closely and avoid hypertension   - Pending RPT ECHO     # CAD with CABG   - CATH (5/2022) with dLAD 70, SVG graft to OM1 with 90 in stent stenosis s/p PCI and GEMMA placement, and LIMA graft to mLAD with no disease.   - Continue on ASA and brilinta    RESPIRATORY   # Acute respiratory failure second to SARS-COV-2 vs pulm edema vs PNA  - Presented with COVID complicated by sepsis second to acute lynsey and worsening respiratory failure second to pulmonary edema requiring intubation.   - Prolonged intubation and s/p tracheostomy (2/13)   - CT CHEST 1/16 with new small bilateral pleural effusions/ atelectasis/ patchy bilateral ground-glass opacities are consistent with pulmonary edema.  - Completed ABX and COVID regimen as below   - Continue on vent and initially tolerating some SBT 15/5 today   - Continue on nebs and hold further HTS given intermittent hemoptysis  - Continue on diuresis as above    # Mild hemoptysis likely from suction trauma   - s/p bronch (2/18) with no active bleed  - Hemoptysis improved with TXA and holding further HTS as above   - Tiny hemoptysis second to suction trauma imporving  - Careful with suctioning     HEENT   # L eye subconjunctival hemorrhage   - Continue on artifical tears and Lacrilube   - Optho eval appreciated     GI  # Nutrition/ Dysphagia   - PEG placed by GI on 2/20 and tube feeds continued.   - Loose stools and Banatrol continued     # UGIB   - EGD (1/2) with esophagitis and bleeding Dieulafoy's lesion s/p clips.   - Continue on PPI and carafate     # SMA calcification   - CTA demonstrating mesenteric fat stranding associated with ascending/transverse colon  - Diagnostic laparoscopy (12/24) with small bowel and visible colon viable, some inflammation of omentum in RUQ  - SMA Angiogram and stent placed (12/25).   - Continue on ASA and Brilinta     # Acute Cholecystitis   - CT (12/26) with distended GB with cholelithiasis and sludge   - HIDA (12/29) POSITIVE for acute cholecystitis   - Case discussed with IR at length and s/p PERC LYNSEY (1/26)   - Completed zosyn course (12/24-12/31)     / RENAL   # CKD   - CRE at baseline   - Monitor renal function and UOP     # Hypernatremia   -  Q4H added with improvement   - Monitor closely with diuresis as above     INFECTIOUS DISEASE   # COVID with superimposed PNA   # ESBL Kleb PNA   - COVID POSITIVE (12/23) and completed remdesivir x 1 dose and paxlovid course.   - WOB worsened as above requiring intubation and cultures negative. Zosyn completed empirically however hypothermic (2/11) and BCx, UA, RVP and BAL negative.   - Completed additional zosyn (2/12-2/19) empirically   - Fever spike and thick secretions and SCX (2/26) with ESBL klebs.   - s/p Zosyn (2/27 - 2/29) but resistant and changed to Erta (2/29 - )     HEME  # AOCD   - Monitor HH   - DVT PPX with HSQ     ENDOCRINE   # DM2 A1C 9.3   - Continue on Lantus 12 and ISS     SKIN/ TUBES   - Trach (2/13)   - PEG (2/20)   - PERC LYNSEY (1/26 - )     ETHICS   - FULL CODE     DISPO - vent facility and family aware. SW aware to send out to facilities and family to discuss on DISPO plan.  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 Course ultimately complicated by progressive WOB and O2 demand and patient intubated and transferred to MICU (1/22). Course further complicated by acute cholecystitis and s/p Perc Lynsey with IR on (1/26), HFrEF 38, pulmonary edema, superimposed PNA, failed extubation failed and prolonged vent time and s/p trach (2/13). Patient transferred to RCU (2/16) and s/p PEG (2/20)     NEUROLOGY   # Encephalopathy   - Hx of CVA with no residual deficits per family, however per family worsening confusion at home over the past 6 months (becoming disoriented to time) but prior to admission has been more confused, talking about seeing people that are not present.  - CTH (1/31) with no evidence of acute infarct  - Continue on ASA and Brilinta  - Holding Neurontin, Memantine and Oxycodone in setting of somnolence    # ICA stenosis   - CTA NECK (1/31) with moderate to severe narrowing left internal carotid at the origin. Severe narrowing right internal carotid by a tandem lesion 1.5 cm above the bifurcation with a narrowed internal carotid beyond the lesion. Extensive calcified plaque both cavernous and supraclinoid carotid arteries with narrowing but no occlusion. Mild narrowing proximal right M1 segment. Moderate narrowing both vertebral V4 segments. No perfusion abnormality at the Tmax 6 second threshold, but moderate hypoperfusion in the right MCA territory at the 4 second threshold.   - Continue on ASA and Brilinta    # Myoclonic jerks   - Hx of myoclonic jerks post CVAs   - EEG (1/18-2/6) negative for seizures  - Continue on Keppra and per neuro wishes to wean, however family wishes to keep current dose as home medication.     # Depression/ Insomnia  - Continue on Lexapro per home medication   - Course complicated by agitation and pulling on tubes   - Continue on Seroquel 25 QHS but QTC prolonged and now held   - Continue on Ativan PRN  - Supportive care     CARDIOLOGY   # Vasoplegic vs septic shock  # Hx of HTN   - Weaned off pressors in ICU and now with mild hypertension   - Continue on coreg and hydral as below   - Strict BP control given moderate AS    # AFIB RVR   # Bifascicular Block with Medtronic PPM   - AFIB RVR episodes and PPM interrogated (2/20) by EP with similar episodes  - Previous admission in 6/2022 with cardiogenic syncope second to bifasicular block, however now with PPM and AV myron blockers ok.   - Continue on lopressor 12.5mg BID however replaced with coreg second to HFrEF   - AC discussed at length however with recent GIB will hold for now     # HFrEF 38 likely stress induced?   # Mild to moderate AS   - ECHO (12/2023) with EF 62 with normal LVSF and mild to moderate AS   - ECHO (2/2024) with EF 38, moderate LVSD with global LV hypokinesis, mildly reduced RVSF (TAPSE 1.3), mild to moderate MR, mild AR, and moderate AS.   - Continue on coreg 6.25 and hydralazine 50 (increased 3/4)   - Hold isordil and up titrate above medications first   - Continue on diuresis by dose (last Lasix 40mg IVP given 3/4)  - Pending RPT ECHO     # CAD with CABG   - CATH (5/2022) with dLAD 70, SVG graft to OM1 with 90 in stent stenosis s/p PCI and GEMMA placement, and LIMA graft to mLAD with no disease.   - Continue on ASA and brilinta    RESPIRATORY   # Acute respiratory failure second to SARS-COV-2 vs pulm edema vs PNA  - Presented with COVID complicated by sepsis second to acute lynsey and worsening respiratory failure second to pulmonary edema requiring intubation.   - Prolonged intubation and s/p tracheostomy (2/13)   - CT CHEST 1/16 with new small bilateral pleural effusions/ atelectasis/ patchy bilateral ground-glass opacities are consistent with pulmonary edema.  - Completed ABX and COVID regimen as below   - Continue on vent and initially tolerating some SBT 15/5 today   - Continue on nebs and hold further HTS given intermittent hemoptysis  - Continue on diuresis as above    # Mild hemoptysis likely from suction trauma   - s/p bronch (2/18) with no active bleed  - Hemoptysis improved with TXA and holding further HTS as above   - Tiny hemoptysis second to suction trauma imporving  - Careful with suctioning     HEENT   # L eye subconjunctival hemorrhage   - Continue on artifical tears and Lacrilube   - Optho eval appreciated     GI  # Nutrition/ Dysphagia   - PEG placed by GI on 2/20 and tube feeds continued.   - Loose stools and Banatrol continued     # UGIB   - EGD (1/2) with esophagitis and bleeding Dieulafoy's lesion s/p clips.   - Continue on PPI and carafate     # SMA calcification   - CTA demonstrating mesenteric fat stranding associated with ascending/transverse colon  - Diagnostic laparoscopy (12/24) with small bowel and visible colon viable, some inflammation of omentum in RUQ  - SMA Angiogram and stent placed (12/25).   - Continue on ASA and Brilinta     # Acute Cholecystitis   - CT (12/26) with distended GB with cholelithiasis and sludge   - HIDA (12/29) POSITIVE for acute cholecystitis   - Case discussed with IR at length and s/p PERC LYNSEY (1/26)   - Completed zosyn course (12/24-12/31)     / RENAL   # CKD   - CRE at baseline   - Monitor renal function and UOP     # Hypernatremia   -  Q4H added with improvement   - Monitor closely with diuresis as above     INFECTIOUS DISEASE   # COVID with superimposed PNA   # ESBL Kleb PNA   - COVID POSITIVE (12/23) and completed remdesivir x 1 dose and paxlovid course.   - WOB worsened as above requiring intubation and cultures negative. Zosyn completed empirically however hypothermic (2/11) and BCx, UA, RVP and BAL negative.   - Completed additional zosyn (2/12-2/19) empirically   - Fever spike and thick secretions and SCX (2/26) with ESBL klebs.   - s/p Zosyn (2/27 - 2/29) but resistant and changed to Erta (2/29 - 3/6)     HEME  # AOCD   - Monitor HH   - DVT PPX with HSQ     ENDOCRINE   # DM2 A1C 9.3   - Continue on Lantus 12 and ISS     SKIN/ TUBES   - Trach (2/13)   - PEG (2/20)   - PERC LYNSEY (1/26 - )     ETHICS   - FULL CODE     DISPO - vent facility and family aware. SW aware to send out to facilities and family to discuss on DISPO plan.  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 Course ultimately complicated by progressive WOB and O2 demand and patient intubated and transferred to MICU (1/22). Course further complicated by acute cholecystitis and s/p Perc Lynsey with IR on (1/26), HFrEF 38, pulmonary edema, superimposed PNA, failed extubation failed and prolonged vent time and s/p trach (2/13). Patient transferred to RCU (2/16) and s/p PEG (2/20)     NEUROLOGY   # Encephalopathy   - Hx of CVA with no residual deficits per family, however per family worsening confusion at home over the past 6 months (becoming disoriented to time) but prior to admission has been more confused, talking about seeing people that are not present.  - CTH (1/31) with no evidence of acute infarct  - Continue on ASA and Brilinta  - Holding Neurontin, Memantine and Oxycodone in setting of somnolence    # ICA stenosis   - CTA NECK (1/31) with moderate to severe narrowing left internal carotid at the origin. Severe narrowing right internal carotid by a tandem lesion 1.5 cm above the bifurcation with a narrowed internal carotid beyond the lesion. Extensive calcified plaque both cavernous and supraclinoid carotid arteries with narrowing but no occlusion. Mild narrowing proximal right M1 segment. Moderate narrowing both vertebral V4 segments. No perfusion abnormality at the Tmax 6 second threshold, but moderate hypoperfusion in the right MCA territory at the 4 second threshold.   - Continue on ASA and Brilinta    # Myoclonic jerks   - Hx of myoclonic jerks post CVAs   - EEG (1/18-2/6) negative for seizures  - Continue on Keppra and per neuro wishes to wean, however family wishes to keep current dose as home medication.     # Depression/ Insomnia  - Continue on Lexapro per home medication   - Course complicated by agitation and pulling on tubes   - Continue on Seroquel 25 QHS but QTC prolonged and now held   - Continue on Ativan PRN  - Supportive care     CARDIOLOGY   # Vasoplegic vs septic shock  # Hx of HTN   - Weaned off pressors in ICU and now with mild hypertension   - Continue on coreg and hydral as below   - Strict BP control given moderate AS    # AFIB RVR   # Bifascicular Block with Medtronic PPM   - AFIB RVR episodes and PPM interrogated (2/20) by EP with similar episodes  - Previous admission in 6/2022 with cardiogenic syncope second to bifasicular block, however now with PPM and AV myron blockers ok.   - Continue on lopressor 12.5mg BID however replaced with coreg second to HFrEF   - AC discussed at length however with recent GIB will hold for now     # HFrEF 38 likely stress induced?   # Mild to moderate AS   - ECHO (12/2023) with EF 62 with normal LVSF and mild to moderate AS   - ECHO (2/2024) with EF 38, moderate LVSD with global LV hypokinesis, mildly reduced RVSF (TAPSE 1.3), mild to moderate MR, mild AR, and moderate AS.   - Continue on coreg 6.25 and hydralazine 50 (increased 3/4)   - Hold isordil and up titrate above medications first   - Continue on diuresis by dose (last Lasix 40mg IVP given 3/4)  - Pending RPT ECHO     # CAD with CABG   - CATH (5/2022) with dLAD 70, SVG graft to OM1 with 90 in stent stenosis s/p PCI and GEMMA placement, and LIMA graft to mLAD with no disease.   - Continue on ASA and brilinta    # Prolonged QTC   - ECG (3/2) with QTC 616ms (corrected using bazzet 490ms)   - ECG (3/3) with QTC 634ms (corrected using bazzet 448ms)   - Monitor off/ avoid QTC prolonging agents for now    RESPIRATORY   # Acute respiratory failure second to SARS-COV-2 vs pulm edema vs PNA  - Presented with COVID complicated by sepsis second to acute lynsey and worsening respiratory failure second to pulmonary edema requiring intubation.   - Prolonged intubation and s/p tracheostomy (2/13)   - CT CHEST 1/16 with new small bilateral pleural effusions/ atelectasis/ patchy bilateral ground-glass opacities are consistent with pulmonary edema.  - Completed ABX and COVID regimen as below   - Continue on vent and initially tolerating some SBT 15/5 today   - Continue on nebs and hold further HTS given intermittent hemoptysis  - Continue on diuresis as above    # Mild hemoptysis likely from suction trauma   - s/p bronch (2/18) with no active bleed  - Hemoptysis improved with TXA and holding further HTS as above   - Tiny hemoptysis second to suction trauma imporving  - Careful with suctioning     HEENT   # L eye subconjunctival hemorrhage   - Continue on artifical tears and Lacrilube   - Optho eval appreciated     GI  # Nutrition/ Dysphagia   - PEG placed by GI on 2/20 and tube feeds continued.   - Loose stools and Banatrol continued     # UGIB   - EGD (1/2) with esophagitis and bleeding Dieulafoy's lesion s/p clips.   - Continue on PPI and carafate     # SMA calcification   - CTA demonstrating mesenteric fat stranding associated with ascending/transverse colon  - Diagnostic laparoscopy (12/24) with small bowel and visible colon viable, some inflammation of omentum in RUQ  - SMA Angiogram and stent placed (12/25).   - Continue on ASA and Brilinta     # Acute Cholecystitis   - CT (12/26) with distended GB with cholelithiasis and sludge   - HIDA (12/29) POSITIVE for acute cholecystitis   - Case discussed with IR at length and s/p PERC LYNSEY (1/26)   - Completed zosyn course (12/24-12/31)     / RENAL   # CKD   - CRE at baseline   - Monitor renal function and UOP     # Hypernatremia   -  Q4H added however sodium downtrending and FWF dc'ed 3/4   - Monitor closely with diuresis as above     INFECTIOUS DISEASE   # COVID with superimposed PNA   # ESBL Kleb PNA   - COVID POSITIVE (12/23) and completed remdesivir x 1 dose and paxlovid course.   - WOB worsened as above requiring intubation and cultures negative. Zosyn completed empirically however hypothermic (2/11) and BCx, UA, RVP and BAL negative.   - Completed additional zosyn (2/12-2/19) empirically   - Fever spike and thick secretions and SCX (2/26) with ESBL klebs.   - s/p Zosyn (2/27 - 2/29) but resistant and changed to Erta (2/29 - 3/6)     HEME  # AOCD   - Monitor HH   - DVT PPX with HSQ     ENDOCRINE   # DM2 A1C 9.3   - Continue on Lantus 12 and ISS     SKIN/ TUBES   - Trach (2/13)   - PEG (2/20)   - PERC LYNSEY (1/26 - )     ETHICS   - FULL CODE     DISPO - vent facility and family aware. Family visiting facilities.  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 Course ultimately complicated by progressive WOB and O2 demand and patient intubated and transferred to MICU (1/22). Course further complicated by acute cholecystitis and s/p Perc Lynsey with IR on (1/26), HFrEF 38, pulmonary edema, superimposed PNA, failed extubation failed and prolonged vent time and s/p trach (2/13). Patient transferred to RCU (2/16) and s/p PEG (2/20)     NEUROLOGY   # Encephalopathy   - Hx of CVA with no residual deficits per family, however per family worsening confusion at home over the past 6 months (becoming disoriented to time) but prior to admission has been more confused, talking about seeing people that are not present.  - CTH (1/31) with no evidence of acute infarct  - Continue on ASA and Brilinta  - Holding Neurontin, Memantine and Oxycodone in setting of somnolence    # ICA stenosis   - CTA NECK (1/31) with moderate to severe narrowing left internal carotid at the origin. Severe narrowing right internal carotid by a tandem lesion 1.5 cm above the bifurcation with a narrowed internal carotid beyond the lesion. Extensive calcified plaque both cavernous and supraclinoid carotid arteries with narrowing but no occlusion. Mild narrowing proximal right M1 segment. Moderate narrowing both vertebral V4 segments. No perfusion abnormality at the Tmax 6 second threshold, but moderate hypoperfusion in the right MCA territory at the 4 second threshold.   - Continue on ASA and Brilinta    # Myoclonic jerks   - Hx of myoclonic jerks post CVAs   - EEG (1/18-2/6) negative for seizures  - Continue on Keppra and per neuro wishes to wean, however family wishes to keep current dose as home medication.     # Depression/ Insomnia  - Continue on Lexapro per home medication   - Course complicated by agitation and pulling on tubes   - Continue on Seroquel 25 QHS but QTC prolonged and now held   - Continue on Ativan PRN  - Supportive care     CARDIOLOGY   # Vasoplegic vs septic shock  # Hx of HTN   - Weaned off pressors in ICU and now with mild hypertension   - Continue on coreg and hydral as below   - Strict BP control given moderate AS    # AFIB RVR   # Bifascicular Block with Medtronic PPM   - AFIB RVR episodes and PPM interrogated (2/20) by EP with similar episodes  - Previous admission in 6/2022 with cardiogenic syncope second to bifasicular block, however now with PPM and AV myron blockers ok.   - Continue on lopressor 12.5mg BID however replaced with coreg second to HFrEF   - AC discussed at length however with recent GIB will hold for now     # HFrEF 38 likely stress induced?   # Mild to moderate AS   - ECHO (12/2023) with EF 62 with normal LVSF and mild to moderate AS   - ECHO (2/2024) with EF 38, moderate LVSD with global LV hypokinesis, mildly reduced RVSF (TAPSE 1.3), mild to moderate MR, mild AR, and moderate AS.   - Continue on coreg 6.25 and hydralazine 50 (increased 3/4)   - Hold isordil and up titrate above medications first   - Continue on diuresis by dose (last Lasix 40mg IVP given 3/4)  - Pending RPT ECHO     # CAD with CABG   - CATH (5/2022) with dLAD 70, SVG graft to OM1 with 90 in stent stenosis s/p PCI and GEMMA placement, and LIMA graft to mLAD with no disease.   - Continue on ASA and brilinta    # Prolonged QTC   - ECG (3/2) with QTC 616ms (corrected using bazzet 490ms)   - ECG (3/3) with QTC 634ms (corrected using bazzet 448ms)   - Monitor off/ avoid QTC prolonging agents for now    RESPIRATORY   # Acute respiratory failure second to SARS-COV-2 vs pulm edema vs PNA  - Presented with COVID complicated by sepsis second to acute lynsey and worsening respiratory failure second to pulmonary edema requiring intubation.   - Prolonged intubation and s/p tracheostomy (2/13)   - CT CHEST 1/16 with new small bilateral pleural effusions/ atelectasis/ patchy bilateral ground-glass opacities are consistent with pulmonary edema.  - Completed ABX and COVID regimen as below   - Continue on vent and initially tolerating some SBT 12/5 today   - Continue on nebs and hold further HTS given intermittent hemoptysis  - Continue on diuresis as above    # Mild hemoptysis likely from suction trauma   - s/p bronch (2/18) with no active bleed  - Hemoptysis improved with TXA and holding further HTS as above   - Tiny hemoptysis second to suction trauma imporving  - Careful with suctioning     HEENT   # L eye subconjunctival hemorrhage   - Continue on artifical tears and Lacrilube   - Optho eval appreciated     GI  # Nutrition/ Dysphagia   - PEG placed by GI on 2/20 and tube feeds continued.   - Loose stools and Banatrol continued     # UGIB   - EGD (1/2) with esophagitis and bleeding Dieulafoy's lesion s/p clips.   - Continue on PPI and carafate     # SMA calcification   - CTA demonstrating mesenteric fat stranding associated with ascending/transverse colon  - Diagnostic laparoscopy (12/24) with small bowel and visible colon viable, some inflammation of omentum in RUQ  - SMA Angiogram and stent placed (12/25).   - Continue on ASA and Brilinta     # Acute Cholecystitis   - CT (12/26) with distended GB with cholelithiasis and sludge   - HIDA (12/29) POSITIVE for acute cholecystitis   - Case discussed with IR at length and s/p PERC LYNSEY (1/26)   - Completed zosyn course (12/24-12/31)     / RENAL   # CKD   - CRE at baseline   - Monitor renal function and UOP     # Hypernatremia   -  Q4H added however sodium downtrending and FWF dc'ed 3/4   - Monitor closely with diuresis as above     INFECTIOUS DISEASE   # COVID with superimposed PNA   # ESBL Kleb PNA   - COVID POSITIVE (12/23) and completed remdesivir x 1 dose and paxlovid course.   - WOB worsened as above requiring intubation and cultures negative. Zosyn completed empirically however hypothermic (2/11) and BCx, UA, RVP and BAL negative.   - Completed additional zosyn (2/12-2/19) empirically   - Fever spike and thick secretions and SCX (2/26) with ESBL klebs.   - s/p Zosyn (2/27 - 2/29) but resistant and changed to Erta (2/29 - 3/6)     HEME  # AOCD   - Monitor HH   - DVT PPX with HSQ     ENDOCRINE   # DM2 A1C 9.3   - Continue on Lantus 12 and ISS     SKIN/ TUBES   - Trach (2/13)   - PEG (2/20)   - PERC LYNSEY (1/26 - )     ETHICS   - FULL CODE     DISPO - vent facility and family aware. Family visiting facilities.

## 2024-03-05 LAB
ANION GAP SERPL CALC-SCNC: 13 MMOL/L — SIGNIFICANT CHANGE UP (ref 7–14)
BUN SERPL-MCNC: 34 MG/DL — HIGH (ref 7–23)
CALCIUM SERPL-MCNC: 8.7 MG/DL — SIGNIFICANT CHANGE UP (ref 8.4–10.5)
CHLORIDE SERPL-SCNC: 97 MMOL/L — LOW (ref 98–107)
CO2 SERPL-SCNC: 29 MMOL/L — SIGNIFICANT CHANGE UP (ref 22–31)
CREAT SERPL-MCNC: 1.14 MG/DL — SIGNIFICANT CHANGE UP (ref 0.5–1.3)
EGFR: 61 ML/MIN/1.73M2 — SIGNIFICANT CHANGE UP
GLUCOSE BLDC GLUCOMTR-MCNC: 169 MG/DL — HIGH (ref 70–99)
GLUCOSE BLDC GLUCOMTR-MCNC: 187 MG/DL — HIGH (ref 70–99)
GLUCOSE BLDC GLUCOMTR-MCNC: 193 MG/DL — HIGH (ref 70–99)
GLUCOSE BLDC GLUCOMTR-MCNC: 204 MG/DL — HIGH (ref 70–99)
GLUCOSE SERPL-MCNC: 172 MG/DL — HIGH (ref 70–99)
HCT VFR BLD CALC: 28.1 % — LOW (ref 39–50)
HGB BLD-MCNC: 8.7 G/DL — LOW (ref 13–17)
MAGNESIUM SERPL-MCNC: 2.3 MG/DL — SIGNIFICANT CHANGE UP (ref 1.6–2.6)
MCHC RBC-ENTMCNC: 28.8 PG — SIGNIFICANT CHANGE UP (ref 27–34)
MCHC RBC-ENTMCNC: 31 GM/DL — LOW (ref 32–36)
MCV RBC AUTO: 93 FL — SIGNIFICANT CHANGE UP (ref 80–100)
NRBC # BLD: 0 /100 WBCS — SIGNIFICANT CHANGE UP (ref 0–0)
NRBC # FLD: 0 K/UL — SIGNIFICANT CHANGE UP (ref 0–0)
PHOSPHATE SERPL-MCNC: 3 MG/DL — SIGNIFICANT CHANGE UP (ref 2.5–4.5)
PLATELET # BLD AUTO: 449 K/UL — HIGH (ref 150–400)
POTASSIUM SERPL-MCNC: 4.3 MMOL/L — SIGNIFICANT CHANGE UP (ref 3.5–5.3)
POTASSIUM SERPL-SCNC: 4.3 MMOL/L — SIGNIFICANT CHANGE UP (ref 3.5–5.3)
RBC # BLD: 3.02 M/UL — LOW (ref 4.2–5.8)
RBC # FLD: 16.3 % — HIGH (ref 10.3–14.5)
SODIUM SERPL-SCNC: 139 MMOL/L — SIGNIFICANT CHANGE UP (ref 135–145)
WBC # BLD: 8.82 K/UL — SIGNIFICANT CHANGE UP (ref 3.8–10.5)
WBC # FLD AUTO: 8.82 K/UL — SIGNIFICANT CHANGE UP (ref 3.8–10.5)

## 2024-03-05 PROCEDURE — 99233 SBSQ HOSP IP/OBS HIGH 50: CPT | Mod: FS

## 2024-03-05 RX ORDER — ACETAMINOPHEN 500 MG
1000 TABLET ORAL ONCE
Refills: 0 | Status: COMPLETED | OUTPATIENT
Start: 2024-03-05 | End: 2024-03-05

## 2024-03-05 RX ADMIN — Medication 3 MILLILITER(S): at 19:32

## 2024-03-05 RX ADMIN — Medication 2: at 17:18

## 2024-03-05 RX ADMIN — Medication 1 GRAM(S): at 05:24

## 2024-03-05 RX ADMIN — CARVEDILOL PHOSPHATE 6.25 MILLIGRAM(S): 80 CAPSULE, EXTENDED RELEASE ORAL at 05:25

## 2024-03-05 RX ADMIN — HEPARIN SODIUM 5000 UNIT(S): 5000 INJECTION INTRAVENOUS; SUBCUTANEOUS at 13:07

## 2024-03-05 RX ADMIN — Medication 50 MILLIGRAM(S): at 21:04

## 2024-03-05 RX ADMIN — CHLORHEXIDINE GLUCONATE 1 APPLICATION(S): 213 SOLUTION TOPICAL at 11:03

## 2024-03-05 RX ADMIN — Medication 1 APPLICATION(S): at 21:05

## 2024-03-05 RX ADMIN — HEPARIN SODIUM 5000 UNIT(S): 5000 INJECTION INTRAVENOUS; SUBCUTANEOUS at 21:05

## 2024-03-05 RX ADMIN — CHLORHEXIDINE GLUCONATE 15 MILLILITER(S): 213 SOLUTION TOPICAL at 05:24

## 2024-03-05 RX ADMIN — CARVEDILOL PHOSPHATE 6.25 MILLIGRAM(S): 80 CAPSULE, EXTENDED RELEASE ORAL at 17:18

## 2024-03-05 RX ADMIN — Medication 1 DROP(S): at 23:27

## 2024-03-05 RX ADMIN — TICAGRELOR 60 MILLIGRAM(S): 90 TABLET ORAL at 17:19

## 2024-03-05 RX ADMIN — ESCITALOPRAM OXALATE 10 MILLIGRAM(S): 10 TABLET, FILM COATED ORAL at 11:04

## 2024-03-05 RX ADMIN — ERTAPENEM SODIUM 120 MILLIGRAM(S): 1 INJECTION, POWDER, LYOPHILIZED, FOR SOLUTION INTRAMUSCULAR; INTRAVENOUS at 12:06

## 2024-03-05 RX ADMIN — Medication 81 MILLIGRAM(S): at 11:05

## 2024-03-05 RX ADMIN — Medication 1 DROP(S): at 11:02

## 2024-03-05 RX ADMIN — LEVETIRACETAM 500 MILLIGRAM(S): 250 TABLET, FILM COATED ORAL at 05:25

## 2024-03-05 RX ADMIN — Medication 3 MILLILITER(S): at 09:03

## 2024-03-05 RX ADMIN — Medication 6 MILLIGRAM(S): at 21:04

## 2024-03-05 RX ADMIN — TICAGRELOR 60 MILLIGRAM(S): 90 TABLET ORAL at 05:24

## 2024-03-05 RX ADMIN — HEPARIN SODIUM 5000 UNIT(S): 5000 INJECTION INTRAVENOUS; SUBCUTANEOUS at 05:24

## 2024-03-05 RX ADMIN — PANTOPRAZOLE SODIUM 40 MILLIGRAM(S): 20 TABLET, DELAYED RELEASE ORAL at 05:25

## 2024-03-05 RX ADMIN — LEVETIRACETAM 500 MILLIGRAM(S): 250 TABLET, FILM COATED ORAL at 17:19

## 2024-03-05 RX ADMIN — INSULIN GLARGINE 12 UNIT(S): 100 INJECTION, SOLUTION SUBCUTANEOUS at 11:19

## 2024-03-05 RX ADMIN — Medication 0.5 MILLIGRAM(S): at 02:28

## 2024-03-05 RX ADMIN — CHLORHEXIDINE GLUCONATE 15 MILLILITER(S): 213 SOLUTION TOPICAL at 17:18

## 2024-03-05 RX ADMIN — Medication 2: at 06:06

## 2024-03-05 RX ADMIN — Medication 0.5 MILLIGRAM(S): at 13:06

## 2024-03-05 RX ADMIN — Medication 1 GRAM(S): at 17:18

## 2024-03-05 RX ADMIN — Medication 50 MILLIGRAM(S): at 13:07

## 2024-03-05 RX ADMIN — Medication 1 DROP(S): at 17:19

## 2024-03-05 RX ADMIN — Medication 1 DROP(S): at 05:25

## 2024-03-05 RX ADMIN — Medication 2 MILLIGRAM(S): at 21:04

## 2024-03-05 RX ADMIN — Medication 4: at 11:15

## 2024-03-05 RX ADMIN — POLYETHYLENE GLYCOL 3350 17 GRAM(S): 17 POWDER, FOR SOLUTION ORAL at 11:04

## 2024-03-05 RX ADMIN — Medication 50 MILLIGRAM(S): at 05:25

## 2024-03-05 RX ADMIN — PANTOPRAZOLE SODIUM 40 MILLIGRAM(S): 20 TABLET, DELAYED RELEASE ORAL at 17:19

## 2024-03-05 RX ADMIN — Medication 2: at 23:28

## 2024-03-05 NOTE — PROGRESS NOTE ADULT - SUBJECTIVE AND OBJECTIVE BOX
CHIEF COMPLAINT: Patient is a 89y old  Male who presents with a chief complaint of COVID19, Chest pain (04 Mar 2024 13:39)      Interval Events:    REVIEW OF SYSTEMS:  [ ] All other systems negative  [ ] Unable to assess ROS because ________    Mode: CPAP with PS, FiO2: 30, PEEP: 5, PS: 12, MAP: 10      OBJECTIVE:  ICU Vital Signs Last 24 Hrs  T(C): 36.7 (05 Mar 2024 08:00), Max: 36.7 (04 Mar 2024 22:11)  T(F): 98.1 (05 Mar 2024 08:00), Max: 98.1 (05 Mar 2024 08:00)  HR: 89 (05 Mar 2024 08:00) (81 - 94)  BP: 132/63 (05 Mar 2024 08:00) (119/56 - 150/80)  BP(mean): 84 (04 Mar 2024 22:11) (82 - 89)  ABP: --  ABP(mean): --  RR: 21 (05 Mar 2024 08:00) (16 - 28)  SpO2: 97% (05 Mar 2024 08:00) (96% - 100%)    O2 Parameters below as of 05 Mar 2024 08:00  Patient On (Oxygen Delivery Method): ventilator    O2 Concentration (%): 30      Mode: CPAP with PS, FiO2: 30, PEEP: 5, PS: 12, MAP: 10    03-04 @ 07:01  -  03-05 @ 07:00  --------------------------------------------------------  IN: 700 mL / OUT: 3410 mL / NET: -2710 mL      CAPILLARY BLOOD GLUCOSE      POCT Blood Glucose.: 169 mg/dL (05 Mar 2024 05:35)      HOSPITAL MEDICATIONS:  MEDICATIONS  (STANDING):  albuterol/ipratropium for Nebulization 3 milliLiter(s) Nebulizer every 12 hours  artificial  tears Solution 1 Drop(s) Left EYE four times a day  aspirin  chewable 81 milliGRAM(s) Enteral Tube daily  carvedilol 6.25 milliGRAM(s) Oral every 12 hours  chlorhexidine 0.12% Liquid 15 milliLiter(s) Oral Mucosa every 12 hours  chlorhexidine 2% Cloths 1 Application(s) Topical daily  dextrose 5%. 1000 milliLiter(s) (100 mL/Hr) IV Continuous <Continuous>  dextrose 5%. 1000 milliLiter(s) (50 mL/Hr) IV Continuous <Continuous>  dextrose 50% Injectable 25 Gram(s) IV Push once  dextrose 50% Injectable 12.5 Gram(s) IV Push once  dextrose 50% Injectable 25 Gram(s) IV Push once  doxazosin 2 milliGRAM(s) Oral at bedtime  ertapenem  IVPB 1000 milliGRAM(s) IV Intermittent every 24 hours  escitalopram Solution 10 milliGRAM(s) Oral daily  glucagon  Injectable 1 milliGRAM(s) IntraMuscular once  heparin   Injectable 5000 Unit(s) SubCutaneous every 8 hours  hydrALAZINE 50 milliGRAM(s) Oral every 8 hours  insulin glargine Injectable (LANTUS) 12 Unit(s) SubCutaneous <User Schedule>  insulin lispro (ADMELOG) corrective regimen sliding scale   SubCutaneous every 6 hours  levETIRAcetam  Solution 500 milliGRAM(s) Oral two times a day  melatonin 6 milliGRAM(s) Oral at bedtime  pantoprazole   Suspension 40 milliGRAM(s) Oral every 12 hours  petrolatum Ophthalmic Ointment 1 Application(s) Left EYE at bedtime  polyethylene glycol 3350 17 Gram(s) Oral daily  senna Syrup 10 milliLiter(s) Oral at bedtime  sucralfate suspension 1 Gram(s) Enteral Tube two times a day  ticagrelor 60 milliGRAM(s) Oral every 12 hours    MEDICATIONS  (PRN):  acetaminophen   Oral Liquid .. 650 milliGRAM(s) Oral every 6 hours PRN Temp greater or equal to 38C (100.4F), Mild Pain (1 - 3), Moderate Pain (4 - 6)  dextrose Oral Gel 15 Gram(s) Oral once PRN Blood Glucose LESS THAN 70 milliGRAM(s)/deciliter  LORazepam   Injectable 0.5 milliGRAM(s) IV Push every 6 hours PRN Agitation      LABS:                        8.7    8.82  )-----------( 449      ( 05 Mar 2024 05:40 )             28.1     03-05    139  |  97<L>  |  34<H>  ----------------------------<  172<H>  4.3   |  29  |  1.14    Ca    8.7      05 Mar 2024 05:40  Phos  3.0     03-05  Mg     2.30     03-05        Urinalysis Basic - ( 05 Mar 2024 05:40 )    Color: x / Appearance: x / SG: x / pH: x  Gluc: 172 mg/dL / Ketone: x  / Bili: x / Urobili: x   Blood: x / Protein: x / Nitrite: x   Leuk Esterase: x / RBC: x / WBC x   Sq Epi: x / Non Sq Epi: x / Bacteria: x            PAST MEDICAL & SURGICAL HISTORY:  Hyperlipemia      Hypertension      Coronary Artery Disease      Diabetes Mellitus Type II      Stented Coronary Artery  total 5 stents, last stent 5/2019      Neuropathy      Myocardial infarction      Stroke  mild left facial numbness   no other residuals verbalized      Myoclonic jerking      Stage 3 chronic kidney disease      History of Cataract Extraction      Hx of CABG      H/O coronary angiogram      S/P coronary artery stent placement  1/6/09      S/P placement of cardiac pacemaker          FAMILY HISTORY:  No pertinent family history in first degree relatives        Social History:  Lives with family  Ambulates with walker  Denies tobacco, EtOH, or illicit substance use (23 Dec 2023 22:51)      RADIOLOGY:  [ ] Reviewed and interpreted by me    PULMONARY FUNCTION TESTS:    EKG: CHIEF COMPLAINT: Patient is a 89y old  Male who presents with a chief complaint of COVID19, Chest pain (04 Mar 2024 13:39)      Interval Events: No acute events overnight     REVIEW OF SYSTEMS:  [x ] Unable to assess ROS because trach    Mode: CPAP with PS, FiO2: 30, PEEP: 5, PS: 12, MAP: 10      OBJECTIVE:  ICU Vital Signs Last 24 Hrs  T(C): 36.7 (05 Mar 2024 08:00), Max: 36.7 (04 Mar 2024 22:11)  T(F): 98.1 (05 Mar 2024 08:00), Max: 98.1 (05 Mar 2024 08:00)  HR: 89 (05 Mar 2024 08:00) (81 - 94)  BP: 132/63 (05 Mar 2024 08:00) (119/56 - 150/80)  BP(mean): 84 (04 Mar 2024 22:11) (82 - 89)  ABP: --  ABP(mean): --  RR: 21 (05 Mar 2024 08:00) (16 - 28)  SpO2: 97% (05 Mar 2024 08:00) (96% - 100%)    O2 Parameters below as of 05 Mar 2024 08:00  Patient On (Oxygen Delivery Method): ventilator    O2 Concentration (%): 30      Mode: CPAP with PS, FiO2: 30, PEEP: 5, PS: 12, MAP: 10    03-04 @ 07:01  -  03-05 @ 07:00  --------------------------------------------------------  IN: 700 mL / OUT: 3410 mL / NET: -2710 mL      CAPILLARY BLOOD GLUCOSE      POCT Blood Glucose.: 169 mg/dL (05 Mar 2024 05:35)      HOSPITAL MEDICATIONS:  MEDICATIONS  (STANDING):  albuterol/ipratropium for Nebulization 3 milliLiter(s) Nebulizer every 12 hours  artificial  tears Solution 1 Drop(s) Left EYE four times a day  aspirin  chewable 81 milliGRAM(s) Enteral Tube daily  carvedilol 6.25 milliGRAM(s) Oral every 12 hours  chlorhexidine 0.12% Liquid 15 milliLiter(s) Oral Mucosa every 12 hours  chlorhexidine 2% Cloths 1 Application(s) Topical daily  dextrose 5%. 1000 milliLiter(s) (100 mL/Hr) IV Continuous <Continuous>  dextrose 5%. 1000 milliLiter(s) (50 mL/Hr) IV Continuous <Continuous>  dextrose 50% Injectable 25 Gram(s) IV Push once  dextrose 50% Injectable 12.5 Gram(s) IV Push once  dextrose 50% Injectable 25 Gram(s) IV Push once  doxazosin 2 milliGRAM(s) Oral at bedtime  ertapenem  IVPB 1000 milliGRAM(s) IV Intermittent every 24 hours  escitalopram Solution 10 milliGRAM(s) Oral daily  glucagon  Injectable 1 milliGRAM(s) IntraMuscular once  heparin   Injectable 5000 Unit(s) SubCutaneous every 8 hours  hydrALAZINE 50 milliGRAM(s) Oral every 8 hours  insulin glargine Injectable (LANTUS) 12 Unit(s) SubCutaneous <User Schedule>  insulin lispro (ADMELOG) corrective regimen sliding scale   SubCutaneous every 6 hours  levETIRAcetam  Solution 500 milliGRAM(s) Oral two times a day  melatonin 6 milliGRAM(s) Oral at bedtime  pantoprazole   Suspension 40 milliGRAM(s) Oral every 12 hours  petrolatum Ophthalmic Ointment 1 Application(s) Left EYE at bedtime  polyethylene glycol 3350 17 Gram(s) Oral daily  senna Syrup 10 milliLiter(s) Oral at bedtime  sucralfate suspension 1 Gram(s) Enteral Tube two times a day  ticagrelor 60 milliGRAM(s) Oral every 12 hours    MEDICATIONS  (PRN):  acetaminophen   Oral Liquid .. 650 milliGRAM(s) Oral every 6 hours PRN Temp greater or equal to 38C (100.4F), Mild Pain (1 - 3), Moderate Pain (4 - 6)  dextrose Oral Gel 15 Gram(s) Oral once PRN Blood Glucose LESS THAN 70 milliGRAM(s)/deciliter  LORazepam   Injectable 0.5 milliGRAM(s) IV Push every 6 hours PRN Agitation      LABS:                        8.7    8.82  )-----------( 449      ( 05 Mar 2024 05:40 )             28.1     03-05    139  |  97<L>  |  34<H>  ----------------------------<  172<H>  4.3   |  29  |  1.14    Ca    8.7      05 Mar 2024 05:40  Phos  3.0     03-05  Mg     2.30     03-05        Urinalysis Basic - ( 05 Mar 2024 05:40 )    Color: x / Appearance: x / SG: x / pH: x  Gluc: 172 mg/dL / Ketone: x  / Bili: x / Urobili: x   Blood: x / Protein: x / Nitrite: x   Leuk Esterase: x / RBC: x / WBC x   Sq Epi: x / Non Sq Epi: x / Bacteria: x            PAST MEDICAL & SURGICAL HISTORY:  Hyperlipemia      Hypertension      Coronary Artery Disease      Diabetes Mellitus Type II      Stented Coronary Artery  total 5 stents, last stent 5/2019      Neuropathy      Myocardial infarction      Stroke  mild left facial numbness   no other residuals verbalized      Myoclonic jerking      Stage 3 chronic kidney disease      History of Cataract Extraction      Hx of CABG      H/O coronary angiogram      S/P coronary artery stent placement  1/6/09      S/P placement of cardiac pacemaker          FAMILY HISTORY:  No pertinent family history in first degree relatives        Social History:  Lives with family  Ambulates with walker  Denies tobacco, EtOH, or illicit substance use (23 Dec 2023 22:51)      RADIOLOGY:  [ ] Reviewed and interpreted by me    PULMONARY FUNCTION TESTS:    EKG:

## 2024-03-05 NOTE — CHART NOTE - NSCHARTNOTEFT_GEN_A_CORE
[facility] NIGHT MEDICINE COVERAGE    Notified by RN that pt had bloody oral secretions suctioned.  Pt assessed at bedside.  Pt's son present at bedside.  ~ mL bloody secretions noted in suction collection cannister at bedside.  Pt awake, in NAD, refusing to open mouth.  No overt bloody secretions noted when tracheal suctioning performed.  Pt's son notes no recent changes w/ patient, also spoke w/ pt's daughter-in-law and PCP, Dr. Lemon, via telephone at bedside per request from the son.  On exam, no epistaxis noted, no TTP over mandibular or maxillary region b/l.  Exam of mouth limited due to poor cooperation from patient - no obvious sources of bleeding noted on exam of the mouth and oropharynx, no pooling of blood in back of mouth noted.  Poor dentition noted.  VSS.    Vital Signs Last 24 Hrs  T(C): 36.4 (05 Mar 2024 21:03), Max: 36.8 (05 Mar 2024 12:00)  T(F): 97.6 (05 Mar 2024 21:03), Max: 98.3 (05 Mar 2024 12:00)  HR: 93 (05 Mar 2024 23:26) (81 - 93)  BP: 149/86 (05 Mar 2024 21:03) (119/56 - 160/84)  RR: 18 (05 Mar 2024 21:03) (16 - 24)  SpO2: 97% (05 Mar 2024 23:26) (91% - 100%)  O2 Parameters below as of 05 Mar 2024 21:03  Patient On (Oxygen Delivery Method): ventilator  O2 Concentration (%): 30    Plan:  -Monitor for re-occurrence of any bleeding, no bleeding observed in pt's mouth at this time.  -Will give Ofirmev 1g IVPB x1 for possible pain limiting pt's cooperation w/ oropharyngeal exam.  -Hemodynamically stable presently.    D/w pt's son at bedside, and pt's daughter-in-law via telephone at bedside.  Will continue to monitor.    Rangel Natarajan PA-C  Department of Hospital Medicine - Night [facility]  In-House Pager: #[phone]

## 2024-03-05 NOTE — PROGRESS NOTE ADULT - SUBJECTIVE AND OBJECTIVE BOX
Aashish Boyer MD  Interventional Cardiology / Advance Heart Failure and Cardiac Transplant Specialist  [place] Office : [phone] [place] [address] [place] [address]  Tel:   [place] Office : [phone] [facility] [place] [address]  Tel: [phone]       Pt is lying in bed s/p trach and PEG   	  MEDICATIONS:  aspirin  chewable 81 milliGRAM(s) Enteral Tube daily  carvedilol 6.25 milliGRAM(s) Oral every 12 hours  doxazosin 2 milliGRAM(s) Oral at bedtime  heparin   Injectable 5000 Unit(s) SubCutaneous every 8 hours  hydrALAZINE 50 milliGRAM(s) Oral every 8 hours  ticagrelor 60 milliGRAM(s) Oral every 12 hours    ertapenem  IVPB 1000 milliGRAM(s) IV Intermittent every 24 hours    albuterol/ipratropium for Nebulization 3 milliLiter(s) Nebulizer every 12 hours    acetaminophen   Oral Liquid .. 650 milliGRAM(s) Oral every 6 hours PRN  escitalopram Solution 10 milliGRAM(s) Oral daily  levETIRAcetam  Solution 500 milliGRAM(s) Oral two times a day  LORazepam   Injectable 0.5 milliGRAM(s) IV Push every 6 hours PRN  melatonin 6 milliGRAM(s) Oral at bedtime    pantoprazole   Suspension 40 milliGRAM(s) Oral every 12 hours  polyethylene glycol 3350 17 Gram(s) Oral daily  senna Syrup 10 milliLiter(s) Oral at bedtime  sucralfate suspension 1 Gram(s) Enteral Tube two times a day    dextrose 50% Injectable 25 Gram(s) IV Push once  dextrose 50% Injectable 25 Gram(s) IV Push once  dextrose 50% Injectable 12.5 Gram(s) IV Push once  dextrose Oral Gel 15 Gram(s) Oral once PRN  glucagon  Injectable 1 milliGRAM(s) IntraMuscular once  insulin glargine Injectable (LANTUS) 12 Unit(s) SubCutaneous <User Schedule>  insulin lispro (ADMELOG) corrective regimen sliding scale   SubCutaneous every 6 hours    artificial  tears Solution 1 Drop(s) Left EYE four times a day  chlorhexidine 0.12% Liquid 15 milliLiter(s) Oral Mucosa every 12 hours  chlorhexidine 2% Cloths 1 Application(s) Topical daily  dextrose 5%. 1000 milliLiter(s) IV Continuous <Continuous>  dextrose 5%. 1000 milliLiter(s) IV Continuous <Continuous>  petrolatum Ophthalmic Ointment 1 Application(s) Left EYE at bedtime      PAST MEDICAL/SURGICAL HISTORY  PAST MEDICAL & SURGICAL HISTORY:  Hyperlipemia      Hypertension      Coronary Artery Disease      Diabetes Mellitus Type II      Stented Coronary Artery  total 5 stents, last stent 5/2019      Neuropathy      Myocardial infarction      Stroke  mild left facial numbness   no other residuals verbalized      Myoclonic jerking      Stage 3 chronic kidney disease      History of Cataract Extraction      Hx of CABG      H/O coronary angiogram      S/P coronary artery stent placement  1/6/09      S/P placement of cardiac pacemaker          SOCIAL HISTORY: Substance Use (street drugs): ( x ) never used  (  ) other:    FAMILY HISTORY:  No pertinent family history in first degree relatives            PHYSICAL EXAM:  T(C): 36.7 (03-05-24 @ 16:00), Max: 36.8 (03-05-24 @ 12:00)  HR: 82 (03-05-24 @ 16:13) (81 - 94)  BP: 141/71 (03-05-24 @ 16:00) (119/56 - 160/84)  RR: 18 (03-05-24 @ 16:00) (16 - 28)  SpO2: 98% (03-05-24 @ 16:13) (94% - 100%)  Wt(kg): --  I&O's Summary    04 Mar 2024 07:01  -  05 Mar 2024 07:00  --------------------------------------------------------  IN: 700 mL / OUT: 3410 mL / NET: -2710 mL          GENERAL: s/p trach and PEG   EYES:   PERRLA   ENMT:  s/p trach   Cardiovascular: Normal S1 S2, No JVD, No murmurs, No edema  Respiratory: Lungs clear to auscultation	  Gastrointestinal:  s/p PEG   Extremities: no edema                                    8.7    8.82  )-----------( 449      ( 05 Mar 2024 05:40 )             28.1     03-05    139  |  97<L>  |  34<H>  ----------------------------<  172<H>  4.3   |  29  |  1.14    Ca    8.7      05 Mar 2024 05:40  Phos  3.0     03-05  Mg     2.30     03-05      proBNP:   Lipid Profile:   HgA1c:   TSH:     Consultant(s) Notes Reviewed:  [x ] YES  [ ] NO    Care Discussed with Consultants/Other Providers [ x] YES  [ ] NO    Imaging Personally Reviewed independently:  [x] YES  [ ] NO    All labs, radiologic studies, vitals, orders and medications list reviewed. Patient is seen and examined at bedside. Case discussed with medical team.

## 2024-03-05 NOTE — PROGRESS NOTE ADULT - ASSESSMENT
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 Course ultimately complicated by progressive WOB and O2 demand and patient intubated and transferred to MICU (1/22). Course further complicated by acute cholecystitis and s/p Perc Lynsey with IR on (1/26), HFrEF 38, pulmonary edema, superimposed PNA, failed extubation failed and prolonged vent time and s/p trach (2/13). Patient transferred to RCU (2/16) and s/p PEG (2/20)     NEUROLOGY   # Encephalopathy   - Hx of CVA with no residual deficits per family, however per family worsening confusion at home over the past 6 months (becoming disoriented to time) but prior to admission has been more confused, talking about seeing people that are not present.  - CTH (1/31) with no evidence of acute infarct  - Continue on ASA and Brilinta  - Holding Neurontin, Memantine and Oxycodone in setting of somnolence    # ICA stenosis   - CTA NECK (1/31) with moderate to severe narrowing left internal carotid at the origin. Severe narrowing right internal carotid by a tandem lesion 1.5 cm above the bifurcation with a narrowed internal carotid beyond the lesion. Extensive calcified plaque both cavernous and supraclinoid carotid arteries with narrowing but no occlusion. Mild narrowing proximal right M1 segment. Moderate narrowing both vertebral V4 segments. No perfusion abnormality at the Tmax 6 second threshold, but moderate hypoperfusion in the right MCA territory at the 4 second threshold.   - Continue on ASA and Brilinta    # Myoclonic jerks   - Hx of myoclonic jerks post CVAs   - EEG (1/18-2/6) negative for seizures  - Continue on Keppra and per neuro wishes to wean, however family wishes to keep current dose as home medication.     # Depression/ Insomnia  - Continue on Lexapro per home medication   - Course complicated by agitation and pulling on tubes   - Continue on Seroquel 25 QHS but QTC prolonged and now held   - Continue on Ativan PRN  - Supportive care     CARDIOLOGY   # Vasoplegic vs septic shock  # Hx of HTN   - Weaned off pressors in ICU and now with mild hypertension   - Continue on coreg and hydral as below   - Strict BP control given moderate AS    # AFIB RVR   # Bifascicular Block with Medtronic PPM   - AFIB RVR episodes and PPM interrogated (2/20) by EP with similar episodes  - Previous admission in 6/2022 with cardiogenic syncope second to bifasicular block, however now with PPM and AV myron blockers ok.   - Continue on lopressor 12.5mg BID however replaced with coreg second to HFrEF   - AC discussed at length however with recent GIB will hold for now     # HFrEF 38 likely stress induced?   # Mild to moderate AS   - ECHO (12/2023) with EF 62 with normal LVSF and mild to moderate AS   - ECHO (2/2024) with EF 38, moderate LVSD with global LV hypokinesis, mildly reduced RVSF (TAPSE 1.3), mild to moderate MR, mild AR, and moderate AS.   - Continue on coreg 6.25 and hydralazine 50 (increased 3/4)   - Hold isordil and up titrate above medications first   - Continue on diuresis by dose (last Lasix 40mg IVP given 3/4)  - Pending RPT ECHO     # CAD with CABG   - CATH (5/2022) with dLAD 70, SVG graft to OM1 with 90 in stent stenosis s/p PCI and GEMMA placement, and LIMA graft to mLAD with no disease.   - Continue on ASA and brilinta    # Prolonged QTC   - ECG (3/2) with QTC 616ms (corrected using bazzet 490ms)   - ECG (3/3) with QTC 634ms (corrected using bazzet 448ms)   - Monitor off/ avoid QTC prolonging agents for now    RESPIRATORY   # Acute respiratory failure second to SARS-COV-2 vs pulm edema vs PNA  - Presented with COVID complicated by sepsis second to acute lynsey and worsening respiratory failure second to pulmonary edema requiring intubation.   - Prolonged intubation and s/p tracheostomy (2/13)   - CT CHEST 1/16 with new small bilateral pleural effusions/ atelectasis/ patchy bilateral ground-glass opacities are consistent with pulmonary edema.  - Completed ABX and COVID regimen as below   - Continue on vent and initially tolerating some SBT 12/5 today   - Continue on nebs and hold further HTS given intermittent hemoptysis  - Continue on diuresis as above    # Mild hemoptysis likely from suction trauma   - s/p bronch (2/18) with no active bleed  - Hemoptysis improved with TXA and holding further HTS as above   - Tiny hemoptysis second to suction trauma imporving  - Careful with suctioning     HEENT   # L eye subconjunctival hemorrhage   - Continue on artifical tears and Lacrilube   - Optho eval appreciated     GI  # Nutrition/ Dysphagia   - PEG placed by GI on 2/20 and tube feeds continued.   - Loose stools and Banatrol continued     # UGIB   - EGD (1/2) with esophagitis and bleeding Dieulafoy's lesion s/p clips.   - Continue on PPI and carafate     # SMA calcification   - CTA demonstrating mesenteric fat stranding associated with ascending/transverse colon  - Diagnostic laparoscopy (12/24) with small bowel and visible colon viable, some inflammation of omentum in RUQ  - SMA Angiogram and stent placed (12/25).   - Continue on ASA and Brilinta     # Acute Cholecystitis   - CT (12/26) with distended GB with cholelithiasis and sludge   - HIDA (12/29) POSITIVE for acute cholecystitis   - Case discussed with IR at length and s/p PERC LYNSEY (1/26)   - Completed zosyn course (12/24-12/31)     / RENAL   # CKD   - CRE at baseline   - Monitor renal function and UOP     # Hypernatremia   -  Q4H added however sodium downtrending and FWF dc'ed 3/4   - Monitor closely with diuresis as above     INFECTIOUS DISEASE   # COVID with superimposed PNA   # ESBL Kleb PNA   - COVID POSITIVE (12/23) and completed remdesivir x 1 dose and paxlovid course.   - WOB worsened as above requiring intubation and cultures negative. Zosyn completed empirically however hypothermic (2/11) and BCx, UA, RVP and BAL negative.   - Completed additional zosyn (2/12-2/19) empirically   - Fever spike and thick secretions and SCX (2/26) with ESBL klebs.   - s/p Zosyn (2/27 - 2/29) but resistant and changed to Erta (2/29 - 3/6)     HEME  # AOCD   - Monitor HH   - DVT PPX with HSQ     ENDOCRINE   # DM2 A1C 9.3   - Continue on Lantus 12 and ISS     SKIN/ TUBES   - Trach (2/13)   - PEG (2/20)   - PERC LYNSEY (1/26 - )     ETHICS   - FULL CODE     DISPO - vent facility and family aware. Family visiting facilities.

## 2024-03-05 NOTE — PROGRESS NOTE ADULT - ASSESSMENT
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     EKG SR RBBB (old per family)     1) Hypoxia   - s/p bronch   - Rt pleural effusion   - held lasix given Hypernatremia and worsening creat , CXR with cephalization, given a does of IV lasix monitor u/o renal function    - f/u neuro recs MRI brain shows no acute disease  - s/p trach and PEG  -corrected QTc on         2) CAD s/p CABG  - p/w chest pain. EKG non ischemic , trop -sera   - chest pains  Trop mildly elevated and trending down likely sec to kidney disease,   - holding brilinta, was on it for SMA stent placed in 12/2023, held 2/2 anticipation for PEG placement, restart when no further invasive procedures planned  -TTE 2/12 with  mod decrease LV systolic function, new.  will medically manage likely stress induced.      3) Covid +  - s/p remdesivir   - c/w supportive management   - t/t per primary team   -resolved    4) Abd distension   -CTA AP obtained which showed  partially occlusive calcified and non-calcified plaque just distal to take-off of the SMA, with estimated at least 75% luminal narrowing. Limited evaluation of its distal branches. 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   -on Protonix IV q 12      6) Afib  -hx of PAF  -no AC 2/2 GIB  -on coreg 6.25mg BID

## 2024-03-05 NOTE — PROGRESS NOTE ADULT - SUBJECTIVE AND OBJECTIVE BOX
NEPHROLOGY-[facility] [phone]        Patient seen and examined trach to vent          MEDICATIONS  (STANDING):  albuterol/ipratropium for Nebulization 3 milliLiter(s) Nebulizer every 12 hours  artificial  tears Solution 1 Drop(s) Left EYE four times a day  aspirin  chewable 81 milliGRAM(s) Enteral Tube daily  carvedilol 6.25 milliGRAM(s) Oral every 12 hours  chlorhexidine 0.12% Liquid 15 milliLiter(s) Oral Mucosa every 12 hours  chlorhexidine 2% Cloths 1 Application(s) Topical daily  dextrose 5%. 1000 milliLiter(s) (100 mL/Hr) IV Continuous <Continuous>  dextrose 5%. 1000 milliLiter(s) (50 mL/Hr) IV Continuous <Continuous>  dextrose 50% Injectable 25 Gram(s) IV Push once  dextrose 50% Injectable 25 Gram(s) IV Push once  dextrose 50% Injectable 12.5 Gram(s) IV Push once  doxazosin 2 milliGRAM(s) Oral at bedtime  ertapenem  IVPB 1000 milliGRAM(s) IV Intermittent every 24 hours  escitalopram Solution 10 milliGRAM(s) Oral daily  glucagon  Injectable 1 milliGRAM(s) IntraMuscular once  heparin   Injectable 5000 Unit(s) SubCutaneous every 8 hours  hydrALAZINE 50 milliGRAM(s) Oral every 8 hours  insulin glargine Injectable (LANTUS) 12 Unit(s) SubCutaneous <User Schedule>  insulin lispro (ADMELOG) corrective regimen sliding scale   SubCutaneous every 6 hours  levETIRAcetam  Solution 500 milliGRAM(s) Oral two times a day  melatonin 6 milliGRAM(s) Oral at bedtime  pantoprazole   Suspension 40 milliGRAM(s) Oral every 12 hours  petrolatum Ophthalmic Ointment 1 Application(s) Left EYE at bedtime  polyethylene glycol 3350 17 Gram(s) Oral daily  senna Syrup 10 milliLiter(s) Oral at bedtime  sucralfate suspension 1 Gram(s) Enteral Tube two times a day  ticagrelor 60 milliGRAM(s) Oral every 12 hours      VITAL:  T(C): , Max: 37.1 (03-04-24 @ 04:00)  T(F): , Max: 98.7 (03-04-24 @ 04:00)  HR: 88 (03-04-24 @ 11:09)  BP: 148/77 (03-04-24 @ 08:00)  BP(mean): 98 (03-04-24 @ 08:00)  RR: 17 (03-04-24 @ 08:00)  SpO2: 97% (03-04-24 @ 11:09)  Wt(kg): --    I and O's:    03-03 @ 07:01  -  03-04 @ 07:00  --------------------------------------------------------  IN: 2200 mL / OUT: 2625 mL / NET: -425 mL          PHYSICAL EXAM:  Constitutional: trach to vent   HEENT: +trach  Respiratory: coarse bs   Cardiovascular: normal s1s2  Gastrointestinal: BS+, soft, NT/ND +PEG  Extremities:+ peripheral edema  :  + external catheter   Skin: No rashes    LABS:                        8.4    6.51  )-----------( 401      ( 03 Mar 2024 05:40 )             26.2     03-03    135  |  94<L>  |  36<H>  ----------------------------<  175<H>  4.0   |  30  |  1.20    Ca    8.3<L>      03 Mar 2024 06:35  Phos  3.3     03-03  Mg     2.20     03-03            Urine Studies:  Urinalysis Basic - ( 03 Mar 2024 06:35 )    Color: x / Appearance: x / SG: x / pH: x  Gluc: 175 mg/dL / Ketone: x  / Bili: x / Urobili: x   Blood: x / Protein: x / Nitrite: x   Leuk Esterase: x / RBC: x / WBC x   Sq Epi: x / Non Sq Epi: x / Bacteria: x    IMPRESSION:  89M w/ DM2, HTN, CVA, PAD, CKD3, and CAD-CABG, 12/23/23 p/w COVID19 infection, c/b respiratory failure/hypotension; now s/p tracheostomy    (1)Renal - CKD3; superimposed mild prerenal azotemia - numbers fluctuating based on hemodynamic status  (2)CV - now off pressors and midodrine, BP improved/stable    (3)Pulm - vent-dependent   (4)ID - afebrile, s/p zosyn   (5)GI - s/p PEG (2/20)  (6)Hypernatremia - possibly due to diuresis, NA+ improved     RECOMMEND:   (1)cont lasix prn for hypervolemia   (2)flush PEG as needed   (3)Continue meds for GFR 30-40ml/min        [facility]  [facility]  Office: [phone]       NEPHROLOGY-[facility] [phone]        Patient seen and examined trach to vent  remained agitated on 1:1  on ativan         MEDICATIONS  (STANDING):  albuterol/ipratropium for Nebulization 3 milliLiter(s) Nebulizer every 12 hours  artificial  tears Solution 1 Drop(s) Left EYE four times a day  aspirin  chewable 81 milliGRAM(s) Enteral Tube daily  carvedilol 6.25 milliGRAM(s) Oral every 12 hours  chlorhexidine 0.12% Liquid 15 milliLiter(s) Oral Mucosa every 12 hours  chlorhexidine 2% Cloths 1 Application(s) Topical daily  dextrose 5%. 1000 milliLiter(s) (100 mL/Hr) IV Continuous <Continuous>  dextrose 5%. 1000 milliLiter(s) (50 mL/Hr) IV Continuous <Continuous>  dextrose 50% Injectable 25 Gram(s) IV Push once  dextrose 50% Injectable 25 Gram(s) IV Push once  dextrose 50% Injectable 12.5 Gram(s) IV Push once  doxazosin 2 milliGRAM(s) Oral at bedtime  ertapenem  IVPB 1000 milliGRAM(s) IV Intermittent every 24 hours  escitalopram Solution 10 milliGRAM(s) Oral daily  glucagon  Injectable 1 milliGRAM(s) IntraMuscular once  heparin   Injectable 5000 Unit(s) SubCutaneous every 8 hours  hydrALAZINE 50 milliGRAM(s) Oral every 8 hours  insulin glargine Injectable (LANTUS) 12 Unit(s) SubCutaneous <User Schedule>  insulin lispro (ADMELOG) corrective regimen sliding scale   SubCutaneous every 6 hours  levETIRAcetam  Solution 500 milliGRAM(s) Oral two times a day  melatonin 6 milliGRAM(s) Oral at bedtime  pantoprazole   Suspension 40 milliGRAM(s) Oral every 12 hours  petrolatum Ophthalmic Ointment 1 Application(s) Left EYE at bedtime  polyethylene glycol 3350 17 Gram(s) Oral daily  senna Syrup 10 milliLiter(s) Oral at bedtime  sucralfate suspension 1 Gram(s) Enteral Tube two times a day  ticagrelor 60 milliGRAM(s) Oral every 12 hours      VITAL:  T(C): , Max: 37.1 (03-04-24 @ 04:00)  T(F): , Max: 98.7 (03-04-24 @ 04:00)  HR: 88 (03-04-24 @ 11:09)  BP: 148/77 (03-04-24 @ 08:00)  BP(mean): 98 (03-04-24 @ 08:00)  RR: 17 (03-04-24 @ 08:00)  SpO2: 97% (03-04-24 @ 11:09)  Wt(kg): --    I and O's:    03-03 @ 07:01  -  03-04 @ 07:00  --------------------------------------------------------  IN: 2200 mL / OUT: 2625 mL / NET: -425 mL          PHYSICAL EXAM:  Constitutional: trach to vent   HEENT: +trach  Respiratory: coarse bs   Cardiovascular: normal s1s2  Gastrointestinal: BS+, soft, NT/ND +PEG  Extremities:+ peripheral edema  :  + external catheter   Skin: No rashes    LABS:                        8.4    6.51  )-----------( 401      ( 03 Mar 2024 05:40 )             26.2     03-03    135  |  94<L>  |  36<H>  ----------------------------<  175<H>  4.0   |  30  |  1.20    Ca    8.3<L>      03 Mar 2024 06:35  Phos  3.3     03-03  Mg     2.20     03-03            Urine Studies:  Urinalysis Basic - ( 03 Mar 2024 06:35 )    Color: x / Appearance: x / SG: x / pH: x  Gluc: 175 mg/dL / Ketone: x  / Bili: x / Urobili: x   Blood: x / Protein: x / Nitrite: x   Leuk Esterase: x / RBC: x / WBC x   Sq Epi: x / Non Sq Epi: x / Bacteria: x    IMPRESSION:  89M w/ DM2, HTN, CVA, PAD, CKD3, and CAD-CABG, 12/23/23 p/w COVID19 infection, c/b respiratory failure/hypotension; now s/p tracheostomy    (1)Renal - CKD3; superimposed mild prerenal azotemia - numbers fluctuating based on hemodynamic status  (2)CV - now off pressors and midodrine, BP improved/stable    (3)Pulm - vent-dependent   (4)ID - afebrile, s/p zosyn   (5)GI - s/p PEG (2/20)  (6)Hypernatremia - possibly due to diuresis, NA+ improved     RECOMMEND:   (1)cont lasix prn for hypervolemia   (2)flush PEG as needed   (3)Continue meds for GFR 30-40ml/min        [facility]  Office: [phone]

## 2024-03-05 NOTE — PROGRESS NOTE ADULT - NS ATTEND AMEND GEN_ALL_CORE FT
agree with above  completing abx  weaning on pressure support  TTE reviewed, mod dec EF, global LV hypokinesis  he has been diuresing well  cardiology f/u  d/c planning

## 2024-03-06 LAB
ANION GAP SERPL CALC-SCNC: 10 MMOL/L — SIGNIFICANT CHANGE UP (ref 7–14)
BUN SERPL-MCNC: 33 MG/DL — HIGH (ref 7–23)
CALCIUM SERPL-MCNC: 9 MG/DL — SIGNIFICANT CHANGE UP (ref 8.4–10.5)
CHLORIDE SERPL-SCNC: 97 MMOL/L — LOW (ref 98–107)
CO2 SERPL-SCNC: 30 MMOL/L — SIGNIFICANT CHANGE UP (ref 22–31)
CREAT SERPL-MCNC: 1.09 MG/DL — SIGNIFICANT CHANGE UP (ref 0.5–1.3)
CULTURE RESULTS: SIGNIFICANT CHANGE UP
EGFR: 64 ML/MIN/1.73M2 — SIGNIFICANT CHANGE UP
GLUCOSE BLDC GLUCOMTR-MCNC: 179 MG/DL — HIGH (ref 70–99)
GLUCOSE BLDC GLUCOMTR-MCNC: 228 MG/DL — HIGH (ref 70–99)
GLUCOSE BLDC GLUCOMTR-MCNC: 233 MG/DL — HIGH (ref 70–99)
GLUCOSE SERPL-MCNC: 187 MG/DL — HIGH (ref 70–99)
HCT VFR BLD CALC: 30.5 % — LOW (ref 39–50)
HGB BLD-MCNC: 9.5 G/DL — LOW (ref 13–17)
MAGNESIUM SERPL-MCNC: 2.3 MG/DL — SIGNIFICANT CHANGE UP (ref 1.6–2.6)
MCHC RBC-ENTMCNC: 28.9 PG — SIGNIFICANT CHANGE UP (ref 27–34)
MCHC RBC-ENTMCNC: 31.1 GM/DL — LOW (ref 32–36)
MCV RBC AUTO: 92.7 FL — SIGNIFICANT CHANGE UP (ref 80–100)
NRBC # BLD: 0 /100 WBCS — SIGNIFICANT CHANGE UP (ref 0–0)
NRBC # FLD: 0 K/UL — SIGNIFICANT CHANGE UP (ref 0–0)
PHOSPHATE SERPL-MCNC: 2.7 MG/DL — SIGNIFICANT CHANGE UP (ref 2.5–4.5)
PLATELET # BLD AUTO: 469 K/UL — HIGH (ref 150–400)
POTASSIUM SERPL-MCNC: 4.6 MMOL/L — SIGNIFICANT CHANGE UP (ref 3.5–5.3)
POTASSIUM SERPL-SCNC: 4.6 MMOL/L — SIGNIFICANT CHANGE UP (ref 3.5–5.3)
RBC # BLD: 3.29 M/UL — LOW (ref 4.2–5.8)
RBC # FLD: 16.2 % — HIGH (ref 10.3–14.5)
SODIUM SERPL-SCNC: 137 MMOL/L — SIGNIFICANT CHANGE UP (ref 135–145)
SPECIMEN SOURCE: SIGNIFICANT CHANGE UP
WBC # BLD: 9.22 K/UL — SIGNIFICANT CHANGE UP (ref 3.8–10.5)
WBC # FLD AUTO: 9.22 K/UL — SIGNIFICANT CHANGE UP (ref 3.8–10.5)

## 2024-03-06 PROCEDURE — 99233 SBSQ HOSP IP/OBS HIGH 50: CPT | Mod: FS

## 2024-03-06 RX ORDER — FUROSEMIDE 40 MG
20 TABLET ORAL DAILY
Refills: 0 | Status: DISCONTINUED | OUTPATIENT
Start: 2024-03-06 | End: 2024-03-27

## 2024-03-06 RX ADMIN — Medication 1 GRAM(S): at 05:42

## 2024-03-06 RX ADMIN — ERTAPENEM SODIUM 120 MILLIGRAM(S): 1 INJECTION, POWDER, LYOPHILIZED, FOR SOLUTION INTRAMUSCULAR; INTRAVENOUS at 13:20

## 2024-03-06 RX ADMIN — HEPARIN SODIUM 5000 UNIT(S): 5000 INJECTION INTRAVENOUS; SUBCUTANEOUS at 05:44

## 2024-03-06 RX ADMIN — LEVETIRACETAM 500 MILLIGRAM(S): 250 TABLET, FILM COATED ORAL at 05:42

## 2024-03-06 RX ADMIN — Medication 2: at 05:46

## 2024-03-06 RX ADMIN — CHLORHEXIDINE GLUCONATE 1 APPLICATION(S): 213 SOLUTION TOPICAL at 11:18

## 2024-03-06 RX ADMIN — PANTOPRAZOLE SODIUM 40 MILLIGRAM(S): 20 TABLET, DELAYED RELEASE ORAL at 17:24

## 2024-03-06 RX ADMIN — CHLORHEXIDINE GLUCONATE 15 MILLILITER(S): 213 SOLUTION TOPICAL at 05:42

## 2024-03-06 RX ADMIN — TICAGRELOR 60 MILLIGRAM(S): 90 TABLET ORAL at 05:42

## 2024-03-06 RX ADMIN — Medication 4: at 17:23

## 2024-03-06 RX ADMIN — SENNA PLUS 10 MILLILITER(S): 8.6 TABLET ORAL at 21:54

## 2024-03-06 RX ADMIN — Medication 20 MILLIGRAM(S): at 11:18

## 2024-03-06 RX ADMIN — HEPARIN SODIUM 5000 UNIT(S): 5000 INJECTION INTRAVENOUS; SUBCUTANEOUS at 21:52

## 2024-03-06 RX ADMIN — Medication 1 DROP(S): at 05:43

## 2024-03-06 RX ADMIN — CARVEDILOL PHOSPHATE 6.25 MILLIGRAM(S): 80 CAPSULE, EXTENDED RELEASE ORAL at 05:42

## 2024-03-06 RX ADMIN — Medication 50 MILLIGRAM(S): at 13:21

## 2024-03-06 RX ADMIN — CHLORHEXIDINE GLUCONATE 15 MILLILITER(S): 213 SOLUTION TOPICAL at 17:24

## 2024-03-06 RX ADMIN — LEVETIRACETAM 500 MILLIGRAM(S): 250 TABLET, FILM COATED ORAL at 17:24

## 2024-03-06 RX ADMIN — Medication 0.5 MILLIGRAM(S): at 07:46

## 2024-03-06 RX ADMIN — Medication 4: at 11:20

## 2024-03-06 RX ADMIN — Medication 1 APPLICATION(S): at 21:54

## 2024-03-06 RX ADMIN — ESCITALOPRAM OXALATE 10 MILLIGRAM(S): 10 TABLET, FILM COATED ORAL at 11:18

## 2024-03-06 RX ADMIN — CARVEDILOL PHOSPHATE 6.25 MILLIGRAM(S): 80 CAPSULE, EXTENDED RELEASE ORAL at 17:23

## 2024-03-06 RX ADMIN — Medication 1 DROP(S): at 17:23

## 2024-03-06 RX ADMIN — INSULIN GLARGINE 12 UNIT(S): 100 INJECTION, SOLUTION SUBCUTANEOUS at 11:19

## 2024-03-06 RX ADMIN — POLYETHYLENE GLYCOL 3350 17 GRAM(S): 17 POWDER, FOR SOLUTION ORAL at 11:19

## 2024-03-06 RX ADMIN — Medication 1 DROP(S): at 11:16

## 2024-03-06 RX ADMIN — Medication 400 MILLIGRAM(S): at 00:02

## 2024-03-06 RX ADMIN — Medication 1 GRAM(S): at 17:24

## 2024-03-06 RX ADMIN — Medication 50 MILLIGRAM(S): at 05:46

## 2024-03-06 RX ADMIN — Medication 0.5 MILLIGRAM(S): at 00:27

## 2024-03-06 RX ADMIN — Medication 3 MILLILITER(S): at 22:41

## 2024-03-06 RX ADMIN — Medication 81 MILLIGRAM(S): at 11:19

## 2024-03-06 RX ADMIN — TICAGRELOR 60 MILLIGRAM(S): 90 TABLET ORAL at 17:24

## 2024-03-06 RX ADMIN — Medication 50 MILLIGRAM(S): at 21:52

## 2024-03-06 RX ADMIN — Medication 3 MILLILITER(S): at 10:40

## 2024-03-06 RX ADMIN — Medication 1000 MILLIGRAM(S): at 00:32

## 2024-03-06 RX ADMIN — Medication 6 MILLIGRAM(S): at 21:52

## 2024-03-06 RX ADMIN — Medication 2 MILLIGRAM(S): at 21:53

## 2024-03-06 RX ADMIN — HEPARIN SODIUM 5000 UNIT(S): 5000 INJECTION INTRAVENOUS; SUBCUTANEOUS at 13:20

## 2024-03-06 RX ADMIN — PANTOPRAZOLE SODIUM 40 MILLIGRAM(S): 20 TABLET, DELAYED RELEASE ORAL at 05:42

## 2024-03-06 NOTE — PROGRESS NOTE ADULT - NS ATTEND AMEND GEN_ALL_CORE FT
agree with above  hemodyn stable and afebrile  completing abx  agitation well controlled with low dose of ativan  continue weaning as able  start maint dose lasix  d/c planning - family looking at facilities, aware of upcoming plan for d/c

## 2024-03-06 NOTE — PROGRESS NOTE ADULT - ASSESSMENT
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     EKG SR RBBB (old per family)     1) Hypoxia   - s/p bronch   - Rt pleural effusion   - held lasix given Hypernatremia and worsening creat , CXR with cephalization, given a does of IV lasix monitor u/o renal function    - f/u neuro recs MRI brain shows no acute disease  - s/p trach and PEG  -corrected QTc on         2) CAD s/p CABG  - p/w chest pain. EKG non ischemic , trop -sera   - chest pains  Trop mildly elevated and trending down likely sec to kidney disease,   - holding brilinta, was on it for SMA stent placed in 12/2023, held 2/2 anticipation for PEG placement, restart when no further invasive procedures planned  -TTE 2/12 with  mod decrease LV systolic function, new.  will medically manage likely stress induced.  repeat TTE with mild-mod LV dysfunction    3) Covid +  - s/p remdesivir   - c/w supportive management   - t/t per primary team   -resolved    4) Abd distension   -CTA AP obtained which showed  partially occlusive calcified and non-calcified plaque just distal to take-off of the SMA, with estimated at least 75% luminal narrowing. Limited evaluation of its distal branches. 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   -on Protonix IV q 12      6) Afib  -hx of PAF  -no AC 2/2 GIB  -on coreg 6.25mg BID

## 2024-03-06 NOTE — PROGRESS NOTE ADULT - ASSESSMENT
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 Course ultimately complicated by progressive WOB and O2 demand and patient intubated and transferred to MICU (1/22). Course further complicated by acute cholecystitis and s/p Perc Lynsey with IR on (1/26), HFrEF 38, pulmonary edema, superimposed PNA, failed extubation failed and prolonged vent time and s/p trach (2/13). Patient transferred to RCU (2/16) and s/p PEG (2/20)     NEUROLOGY   # Encephalopathy   - Hx of CVA with no residual deficits per family, however per family worsening confusion at home over the past 6 months (becoming disoriented to time) but prior to admission has been more confused, talking about seeing people that are not present.  - CTH (1/31) with no evidence of acute infarct  - Continue on ASA and Brilinta  - Holding Neurontin, Memantine and Oxycodone in setting of somnolence    # ICA stenosis   - CTA NECK (1/31) with moderate to severe narrowing left internal carotid at the origin. Severe narrowing right internal carotid by a tandem lesion 1.5 cm above the bifurcation with a narrowed internal carotid beyond the lesion. Extensive calcified plaque both cavernous and supraclinoid carotid arteries with narrowing but no occlusion. Mild narrowing proximal right M1 segment. Moderate narrowing both vertebral V4 segments. No perfusion abnormality at the Tmax 6 second threshold, but moderate hypoperfusion in the right MCA territory at the 4 second threshold.   - Continue on ASA and Brilinta    # Myoclonic jerks   - Hx of myoclonic jerks post CVAs   - EEG (1/18-2/6) negative for seizures  - Continue on Keppra and per neuro wishes to wean, however family wishes to keep current dose as home medication.     # Depression/ Insomnia  - Continue on Lexapro per home medication   - Course complicated by agitation and pulling on tubes   - Continue on Seroquel 25 QHS but QTC prolonged and now held   - Continue on Ativan PRN  - Supportive care     CARDIOLOGY   # Vasoplegic vs septic shock  # Hx of HTN   - Weaned off pressors in ICU and now with mild hypertension   - Continue on coreg and hydral as below   - Strict BP control given moderate AS    # AFIB RVR   # Bifascicular Block with Medtronic PPM   - AFIB RVR episodes and PPM interrogated (2/20) by EP with similar episodes  - Previous admission in 6/2022 with cardiogenic syncope second to bifasicular block, however now with PPM and AV myron blockers ok.   - Continue on lopressor 12.5mg BID however replaced with coreg second to HFrEF   - AC discussed at length however with recent GIB will hold for now     # HFrEF 38 likely stress induced?   # Mild to moderate AS   - ECHO (12/2023) with EF 62 with normal LVSF and mild to moderate AS   - ECHO (2/2024) with EF 38, moderate LVSD with global LV hypokinesis, mildly reduced RVSF (TAPSE 1.3), mild to moderate MR, mild AR, and moderate AS.   - Continue on coreg 6.25 and hydralazine 50 (increased 3/4)   - Hold isordil and up titrate above medications first   - Continue on diuresis by dose (last Lasix 40mg IVP given 3/4)  - Pending RPT ECHO     # CAD with CABG   - CATH (5/2022) with dLAD 70, SVG graft to OM1 with 90 in stent stenosis s/p PCI and GEMMA placement, and LIMA graft to mLAD with no disease.   - Continue on ASA and brilinta    # Prolonged QTC   - ECG (3/2) with QTC 616ms (corrected using bazzet 490ms)   - ECG (3/3) with QTC 634ms (corrected using bazzet 448ms)   - Monitor off/ avoid QTC prolonging agents for now    RESPIRATORY   # Acute respiratory failure second to SARS-COV-2 vs pulm edema vs PNA  - Presented with COVID complicated by sepsis second to acute lynsey and worsening respiratory failure second to pulmonary edema requiring intubation.   - Prolonged intubation and s/p tracheostomy (2/13)   - CT CHEST 1/16 with new small bilateral pleural effusions/ atelectasis/ patchy bilateral ground-glass opacities are consistent with pulmonary edema.  - Completed ABX and COVID regimen as below   - Continue on vent and initially tolerating some SBT 12/5 today   - Continue on nebs and hold further HTS given intermittent hemoptysis  - Continue on diuresis as above    # Mild hemoptysis likely from suction trauma   - s/p bronch (2/18) with no active bleed  - Hemoptysis improved with TXA and holding further HTS as above   - Tiny hemoptysis second to suction trauma imporving  - Careful with suctioning     HEENT   # L eye subconjunctival hemorrhage   - Continue on artifical tears and Lacrilube   - Optho eval appreciated     GI  # Nutrition/ Dysphagia   - PEG placed by GI on 2/20 and tube feeds continued.   - Loose stools and Banatrol continued     # UGIB   - EGD (1/2) with esophagitis and bleeding Dieulafoy's lesion s/p clips.   - Continue on PPI and carafate     # SMA calcification   - CTA demonstrating mesenteric fat stranding associated with ascending/transverse colon  - Diagnostic laparoscopy (12/24) with small bowel and visible colon viable, some inflammation of omentum in RUQ  - SMA Angiogram and stent placed (12/25).   - Continue on ASA and Brilinta     # Acute Cholecystitis   - CT (12/26) with distended GB with cholelithiasis and sludge   - HIDA (12/29) POSITIVE for acute cholecystitis   - Case discussed with IR at length and s/p PERC LYNSEY (1/26)   - Completed zosyn course (12/24-12/31)     / RENAL   # CKD   - CRE at baseline   - Monitor renal function and UOP     # Hypernatremia   -  Q4H added however sodium downtrending and FWF dc'ed 3/4   - Monitor closely with diuresis as above     INFECTIOUS DISEASE   # COVID with superimposed PNA   # ESBL Kleb PNA   - COVID POSITIVE (12/23) and completed remdesivir x 1 dose and paxlovid course.   - WOB worsened as above requiring intubation and cultures negative. Zosyn completed empirically however hypothermic (2/11) and BCx, UA, RVP and BAL negative.   - Completed additional zosyn (2/12-2/19) empirically   - Fever spike and thick secretions and SCX (2/26) with ESBL klebs.   - s/p Zosyn (2/27 - 2/29) but resistant and changed to Erta (2/29 - 3/6)     HEME  # AOCD   - Monitor HH   - DVT PPX with HSQ     ENDOCRINE   # DM2 A1C 9.3   - Continue on Lantus 12 and ISS     SKIN/ TUBES   - Trach (2/13)   - PEG (2/20)   - PERC LYNSEY (1/26 - )     ETHICS   - FULL CODE     DISPO - vent facility and family aware. Family visiting facilities.  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 Course ultimately complicated by progressive WOB and O2 demand and patient intubated and transferred to MICU (1/22). Course further complicated by acute cholecystitis and s/p Perc Lynsey with IR on (1/26), HFrEF 38, pulmonary edema, superimposed PNA, failed extubation failed and prolonged vent time and s/p trach (2/13). Patient transferred to RCU (2/16) and s/p PEG (2/20)     NEUROLOGY   # Encephalopathy   - Hx of CVA with no residual deficits per family, however per family worsening confusion at home over the past 6 months (becoming disoriented to time) but prior to admission has been more confused, talking about seeing people that are not present.  - CTH (1/31) with no evidence of acute infarct  - Continue on ASA and Brilinta  - Holding Neurontin, Memantine and Oxycodone in setting of somnolence    # ICA stenosis   - CTA NECK (1/31) with moderate to severe narrowing left internal carotid at the origin. Severe narrowing right internal carotid by a tandem lesion 1.5 cm above the bifurcation with a narrowed internal carotid beyond the lesion. Extensive calcified plaque both cavernous and supraclinoid carotid arteries with narrowing but no occlusion. Mild narrowing proximal right M1 segment. Moderate narrowing both vertebral V4 segments. No perfusion abnormality at the Tmax 6 second threshold, but moderate hypoperfusion in the right MCA territory at the 4 second threshold.   - Continue on ASA and Brilinta    # Myoclonic jerks   - Hx of myoclonic jerks post CVAs   - EEG (1/18-2/6) negative for seizures  - Continue on Keppra and per neuro wishes to wean, however family wishes to keep current dose as home medication.     # Depression/ Insomnia  - Continue on Lexapro per home medication   - Course complicated by agitation and pulling on tubes   - Continue on Seroquel 25 QHS but QTC prolonged and now held   - Continue on Ativan PRN  - Supportive care     CARDIOLOGY   # Vasoplegic vs septic shock  # Hx of HTN   - Weaned off pressors in ICU and now with mild hypertension   - Continue on coreg and hydral as below   - Strict BP control given moderate AS    # AFIB RVR   # Bifascicular Block with Medtronic PPM   - AFIB RVR episodes and PPM interrogated (2/20) by EP with similar episodes  - Previous admission in 6/2022 with cardiogenic syncope second to bifasicular block, however now with PPM and AV myron blockers ok.   - Continue on lopressor 12.5mg BID however replaced with coreg second to HFrEF   - AC discussed at length however with recent GIB will hold for now     # HFrEF 38 likely stress induced?   # Mild to moderate AS   - ECHO (12/2023) with EF 62 with normal LVSF and mild to moderate AS   - ECHO (2/2024) with EF 38, moderate LVSD with global LV hypokinesis, mildly reduced RVSF (TAPSE 1.3), mild to moderate MR, mild AR, and moderate AS.   - RPT ECHO (3/4) with EF 45 and mild to moderate LVSD   - Continue on lasix 20 QD, coreg 6.25 and hydralazine 50 (increased 3/4)   - Hold isordil and up titrate above medications first     # CAD with CABG   - CATH (5/2022) with dLAD 70, SVG graft to OM1 with 90 in stent stenosis s/p PCI and GEMMA placement, and LIMA graft to mLAD with no disease.   - Continue on ASA and brilinta    # Prolonged QTC   - ECG (3/2) with QTC 616ms (corrected using bazzet 490ms)   - ECG (3/3) with QTC 634ms (corrected using bazzet 448ms)   - Monitor off/ avoid QTC prolonging agents for now    RESPIRATORY   # Acute respiratory failure second to SARS-COV-2 vs pulm edema vs PNA  - Presented with COVID complicated by sepsis second to acute lynsey and worsening respiratory failure second to pulmonary edema requiring intubation.   - Prolonged intubation and s/p tracheostomy (2/13)   - CT CHEST 1/16 with new small bilateral pleural effusions/ atelectasis/ patchy bilateral ground-glass opacities are consistent with pulmonary edema.  - Completed ABX and COVID regimen as below   - Continue on vent and initially tolerating some SBT 10/5 today   - Continue on nebs and hold further HTS given intermittent hemoptysis  - Continue on diuresis as above    # Mild hemoptysis likely from suction trauma   - s/p bronch (2/18) with no active bleed  - Hemoptysis improved with TXA and holding further HTS as above   - Tiny hemoptysis second to suction trauma imporving  - Careful with suctioning     HEENT   # L eye subconjunctival hemorrhage   - Continue on artifical tears and Lacrilube   - Optho eval appreciated     GI  # Nutrition/ Dysphagia   - PEG placed by GI on 2/20 and tube feeds continued.   - Loose stools and Banatrol continued     # UGIB   - EGD (1/2) with esophagitis and bleeding Dieulafoy's lesion s/p clips.   - Continue on PPI and carafate     # SMA calcification   - CTA demonstrating mesenteric fat stranding associated with ascending/transverse colon  - Diagnostic laparoscopy (12/24) with small bowel and visible colon viable, some inflammation of omentum in RUQ  - SMA Angiogram and stent placed (12/25).   - Continue on ASA and Brilinta     # Acute Cholecystitis   - CT (12/26) with distended GB with cholelithiasis and sludge   - HIDA (12/29) POSITIVE for acute cholecystitis   - Case discussed with IR at length and s/p PERC LYNSEY (1/26)   - Completed zosyn course (12/24-12/31)   - PERC LYNSEY tube to stay in until surgical candidate for cholecystectomy to prevent recurrence     / RENAL   # CKD   - CRE at baseline   - Monitor renal function and UOP     # Hypernatremia   -  Q4H added however sodium downtrending and FWF dc'ed 3/4   - Monitor closely with diuresis as above     INFECTIOUS DISEASE   # COVID with superimposed PNA   # ESBL Kleb PNA   - COVID POSITIVE (12/23) and completed remdesivir x 1 dose and paxlovid course.   - WOB worsened as above requiring intubation and cultures negative. Zosyn completed empirically however hypothermic (2/11) and BCx, UA, RVP and BAL negative.   - Completed additional zosyn (2/12-2/19) empirically   - Fever spike and thick secretions and SCX (2/26) with ESBL klebs.   - s/p Zosyn (2/27 - 2/29) but resistant and changed to Erta (2/29 - 3/6)     HEME  # AOCD   - Monitor HH   - DVT PPX with HSQ     ENDOCRINE   # DM2 A1C 9.3   - Continue on Lantus 12 and ISS     SKIN/ TUBES   - Trach (2/13)   - PEG (2/20)   - PERC LYNSEY (1/26 - )     ETHICS   - FULL CODE     DISPO - vent facility and family aware. Family visiting facilities.  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 Course ultimately complicated by progressive WOB and O2 demand and patient intubated and transferred to MICU (1/22). Course further complicated by acute cholecystitis and s/p Perc Lynsey with IR on (1/26), HFrEF 38, pulmonary edema, superimposed PNA, failed extubation failed and prolonged vent time and s/p trach (2/13). Patient transferred to RCU (2/16) and s/p PEG (2/20). Course complicated by volume overload and diuresis and GDMT continued and HFrEF 45 with improvement    NEUROLOGY   # Encephalopathy   - Hx of CVA with no residual deficits per family, however per family worsening confusion at home over the past 6 months (becoming disoriented to time) but prior to admission has been more confused, talking about seeing people that are not present.  - CTH (1/31) with no evidence of acute infarct  - Continue on ASA and Brilinta  - Holding Neurontin, Memantine and Oxycodone in setting of somnolence    # ICA stenosis   - CTA NECK (1/31) with moderate to severe narrowing left internal carotid at the origin. Severe narrowing right internal carotid by a tandem lesion 1.5 cm above the bifurcation with a narrowed internal carotid beyond the lesion. Extensive calcified plaque both cavernous and supraclinoid carotid arteries with narrowing but no occlusion. Mild narrowing proximal right M1 segment. Moderate narrowing both vertebral V4 segments. No perfusion abnormality at the Tmax 6 second threshold, but moderate hypoperfusion in the right MCA territory at the 4 second threshold.   - Continue on ASA and Brilinta    # Myoclonic jerks   - Hx of myoclonic jerks post CVAs   - EEG (1/18-2/6) negative for seizures  - Continue on Keppra and per neuro wishes to wean, however family wishes to keep current dose as home medication.     # Depression/ Insomnia  - Continue on Lexapro per home medication   - Course complicated by agitation and pulling on tubes   - Continue on Seroquel 25 QHS but QTC prolonged and now held   - Continue on Ativan PRN  - Supportive care     CARDIOLOGY   # Vasoplegic vs septic shock  # Hx of HTN   - Weaned off pressors in ICU and now with mild hypertension   - Continue on coreg and hydral as below   - Strict BP control given moderate AS    # AFIB RVR   # Bifascicular Block with Medtronic PPM   - AFIB RVR episodes and PPM interrogated (2/20) by EP with similar episodes  - Previous admission in 6/2022 with cardiogenic syncope second to bifasicular block, however now with PPM and AV myron blockers ok.   - Continue on lopressor 12.5mg BID however replaced with coreg second to HFrEF   - AC discussed at length however with recent GIB will hold for now     # HFrEF 38 likely stress induced?   # Mild to moderate AS   - ECHO (12/2023) with EF 62 with normal LVSF and mild to moderate AS   - ECHO (2/2024) with EF 38, moderate LVSD with global LV hypokinesis, mildly reduced RVSF (TAPSE 1.3), mild to moderate MR, mild AR, and moderate AS.   - RPT ECHO (3/4) with EF 45 and mild to moderate LVSD   - Continue on lasix 20 QD, coreg 6.25 and hydralazine 50 (increased 3/4)   - Hold isordil and up titrate above medications first     # CAD with CABG   - CATH (5/2022) with dLAD 70, SVG graft to OM1 with 90 in stent stenosis s/p PCI and GEMMA placement, and LIMA graft to mLAD with no disease.   - Continue on ASA and brilinta    # Prolonged QTC   - ECG (3/2) with QTC 616ms (corrected using bazzet 490ms)   - ECG (3/3) with QTC 634ms (corrected using bazzet 448ms)   - Monitor off/ avoid QTC prolonging agents for now    RESPIRATORY   # Acute respiratory failure second to SARS-COV-2 vs pulm edema vs PNA  - Presented with COVID complicated by sepsis second to acute lynsey and worsening respiratory failure second to pulmonary edema requiring intubation.   - Prolonged intubation and s/p tracheostomy (2/13)   - CT CHEST 1/16 with new small bilateral pleural effusions/ atelectasis/ patchy bilateral ground-glass opacities are consistent with pulmonary edema.  - Completed ABX and COVID regimen as below   - Continue on vent and initially tolerating some SBT 10/5 today   - Continue on nebs and hold further HTS given intermittent hemoptysis  - Continue on diuresis as above    # Mild hemoptysis likely from suction trauma   - s/p bronch (2/18) with no active bleed  - Hemoptysis improved with TXA and holding further HTS as above   - Tiny hemoptysis second to suction trauma imporving  - Careful with suctioning     HEENT   # L eye subconjunctival hemorrhage   - Continue on artifical tears and Lacrilube   - Optho eval appreciated     GI  # Nutrition/ Dysphagia   - PEG placed by GI on 2/20 and tube feeds continued.   - Loose stools and Banatrol continued     # UGIB   - EGD (1/2) with esophagitis and bleeding Dieulafoy's lesion s/p clips.   - Continue on PPI and carafate     # SMA calcification   - CTA demonstrating mesenteric fat stranding associated with ascending/transverse colon  - Diagnostic laparoscopy (12/24) with small bowel and visible colon viable, some inflammation of omentum in RUQ  - SMA Angiogram and stent placed (12/25).   - Continue on ASA and Brilinta     # Acute Cholecystitis   - CT (12/26) with distended GB with cholelithiasis and sludge   - HIDA (12/29) POSITIVE for acute cholecystitis   - Case discussed with IR at length and s/p PERC LYNSEY (1/26)   - Completed zosyn course (12/24-12/31)   - PERC LYNSEY tube to stay in until surgical candidate for cholecystectomy to prevent recurrence     / RENAL   # CKD   - CRE at baseline   - Monitor renal function and UOP     # Hypernatremia   -  Q4H added however sodium downtrending and FWF dc'ed 3/4   - Monitor closely with diuresis as above     INFECTIOUS DISEASE   # COVID with superimposed PNA   # ESBL Kleb PNA   - COVID POSITIVE (12/23) and completed remdesivir x 1 dose and paxlovid course.   - WOB worsened as above requiring intubation and cultures negative. Zosyn completed empirically however hypothermic (2/11) and BCx, UA, RVP and BAL negative.   - Completed additional zosyn (2/12-2/19) empirically   - Fever spike and thick secretions and SCX (2/26) with ESBL klebs.   - s/p Zosyn (2/27 - 2/29) but resistant and changed to Erta (2/29 - 3/6)     HEME  # AOCD   - Monitor HH   - DVT PPX with HSQ     ENDOCRINE   # DM2 A1C 9.3   - Continue on Lantus 12 and ISS     SKIN/ TUBES   - Trach (2/13)   - PEG (2/20)   - PERC LYNSEY (1/26 - )     ETHICS   - FULL CODE     DISPO - vent facility and family aware. Family visiting facilities.

## 2024-03-06 NOTE — PROGRESS NOTE ADULT - SUBJECTIVE AND OBJECTIVE BOX
Aashish Boyer MD  Interventional Cardiology / Advance Heart Failure and Cardiac Transplant Specialist  [place] Office : [phone] [place] [id number]  Tel:   [place] Office : [address] [facility] [address]  Tel: [phone]      Subjective/Overnight events: Pt is lying in bed not in distress    MEDICATIONS:  aspirin  chewable 81 milliGRAM(s) Enteral Tube daily  carvedilol 6.25 milliGRAM(s) Oral every 12 hours  doxazosin 2 milliGRAM(s) Oral at bedtime  furosemide    Tablet 20 milliGRAM(s) Oral daily  heparin   Injectable 5000 Unit(s) SubCutaneous every 8 hours  hydrALAZINE 50 milliGRAM(s) Oral every 8 hours  ticagrelor 60 milliGRAM(s) Oral every 12 hours      albuterol/ipratropium for Nebulization 3 milliLiter(s) Nebulizer every 12 hours    acetaminophen   Oral Liquid .. 650 milliGRAM(s) Oral every 6 hours PRN  escitalopram Solution 10 milliGRAM(s) Oral daily  levETIRAcetam  Solution 500 milliGRAM(s) Oral two times a day  LORazepam   Injectable 0.5 milliGRAM(s) IV Push every 6 hours PRN  melatonin 6 milliGRAM(s) Oral at bedtime    pantoprazole   Suspension 40 milliGRAM(s) Oral every 12 hours  polyethylene glycol 3350 17 Gram(s) Oral daily  senna Syrup 10 milliLiter(s) Oral at bedtime  sucralfate suspension 1 Gram(s) Enteral Tube two times a day    dextrose 50% Injectable 25 Gram(s) IV Push once  dextrose 50% Injectable 12.5 Gram(s) IV Push once  dextrose 50% Injectable 25 Gram(s) IV Push once  dextrose Oral Gel 15 Gram(s) Oral once PRN  glucagon  Injectable 1 milliGRAM(s) IntraMuscular once  insulin glargine Injectable (LANTUS) 12 Unit(s) SubCutaneous <User Schedule>  insulin lispro (ADMELOG) corrective regimen sliding scale   SubCutaneous every 6 hours    artificial  tears Solution 1 Drop(s) Left EYE four times a day  chlorhexidine 0.12% Liquid 15 milliLiter(s) Oral Mucosa every 12 hours  chlorhexidine 2% Cloths 1 Application(s) Topical daily  dextrose 5%. 1000 milliLiter(s) IV Continuous <Continuous>  dextrose 5%. 1000 milliLiter(s) IV Continuous <Continuous>  petrolatum Ophthalmic Ointment 1 Application(s) Left EYE at bedtime      PAST MEDICAL/SURGICAL HISTORY  PAST MEDICAL & SURGICAL HISTORY:  Hyperlipemia      Hypertension      Coronary Artery Disease      Diabetes Mellitus Type II      Stented Coronary Artery  total 5 stents, last stent 5/2019      Neuropathy      Myocardial infarction      Stroke  mild left facial numbness   no other residuals verbalized      Myoclonic jerking      Stage 3 chronic kidney disease      History of Cataract Extraction      Hx of CABG      H/O coronary angiogram      S/P coronary artery stent placement  1/6/09      S/P placement of cardiac pacemaker          SOCIAL HISTORY: Substance Use (street drugs): ( x ) never used  (  ) other:    FAMILY HISTORY:  No pertinent family history in first degree relatives          PHYSICAL EXAM:  T(C): 36.8 (03-06-24 @ 12:00), Max: 36.9 (03-06-24 @ 08:00)  HR: 91 (03-06-24 @ 12:00) (80 - 93)  BP: 154/71 (03-06-24 @ 12:00) (103/47 - 154/71)  RR: 24 (03-06-24 @ 12:00) (16 - 24)  SpO2: 94% (03-06-24 @ 12:00) (91% - 99%)  Wt(kg): --  I&O's Summary    05 Mar 2024 07:01  -  06 Mar 2024 07:00  --------------------------------------------------------  IN: 1300 mL / OUT: 1310 mL / NET: -10 mL        GENERAL: NAD  EYES:  conjunctiva and sclera clear  ENMT: s/p trach  Cardiovascular: Normal S1 S2, No JVD, No murmurs, No edema  Respiratory: Lungs clear to auscultation	  Gastrointestinal:  Soft, s/p PEG  Extremities: + LE edema                                     9.5    9.22  )-----------( 469      ( 06 Mar 2024 06:10 )             30.5     03-06    137  |  97<L>  |  33<H>  ----------------------------<  187<H>  4.6   |  30  |  1.09    Ca    9.0      06 Mar 2024 06:10  Phos  2.7     03-06  Mg     2.30     03-06      proBNP:   Lipid Profile:   HgA1c:   TSH:     Consultant(s) Notes Reviewed:  [x ] YES  [ ] NO    Care Discussed with Consultants/Other Providers [ x] YES  [ ] NO    Imaging Personally Reviewed independently:  [x] YES  [ ] NO    All labs, radiologic studies, vitals, orders and medications list reviewed. Patient is seen and examined at bedside. Case discussed with medical team.

## 2024-03-06 NOTE — PROGRESS NOTE ADULT - SUBJECTIVE AND OBJECTIVE BOX
CHIEF COMPLAINT: Patient is a 89y old  Male who presents with a chief complaint of COVID19, Chest pain (05 Mar 2024 14:05)      INTERVAL EVENTS:  - Bloody secretions from oral suction overnight,  however obvious sign of bleeding and likely old blood?     REVIEW OF SYSTEMS: Seen by bedside during AM rounds and     Mode: CPAP with PS, FiO2: 30, PEEP: 5, PS: 10, MAP: 9      OBJECTIVE:  ICU Vital Signs Last 24 Hrs  T(C): 36.8 (06 Mar 2024 04:49), Max: 36.8 (05 Mar 2024 12:00)  T(F): 98.2 (06 Mar 2024 04:49), Max: 98.3 (05 Mar 2024 12:00)  HR: 84 (06 Mar 2024 07:07) (80 - 93)  BP: 135/66 (06 Mar 2024 04:49) (103/47 - 160/84)  BP(mean): --  ABP: --  ABP(mean): --  RR: 16 (06 Mar 2024 04:49) (16 - 24)  SpO2: 97% (06 Mar 2024 07:07) (91% - 99%)    O2 Parameters below as of 06 Mar 2024 04:49  Patient On (Oxygen Delivery Method): ventilator    O2 Concentration (%): 30      Mode: CPAP with PS, FiO2: 30, PEEP: 5, PS: 10, MAP: 9    03-05 @ 07:01  -  03-06 @ 07:00  --------------------------------------------------------  IN: 1300 mL / OUT: 1310 mL / NET: -10 mL      CAPILLARY BLOOD GLUCOSE  POCT Blood Glucose.: 179 mg/dL (06 Mar 2024 05:18)      HOSPITAL MEDICATIONS:  MEDICATIONS  (STANDING):  albuterol/ipratropium for Nebulization 3 milliLiter(s) Nebulizer every 12 hours  artificial  tears Solution 1 Drop(s) Left EYE four times a day  aspirin  chewable 81 milliGRAM(s) Enteral Tube daily  carvedilol 6.25 milliGRAM(s) Oral every 12 hours  chlorhexidine 0.12% Liquid 15 milliLiter(s) Oral Mucosa every 12 hours  chlorhexidine 2% Cloths 1 Application(s) Topical daily  dextrose 5%. 1000 milliLiter(s) (50 mL/Hr) IV Continuous <Continuous>  dextrose 5%. 1000 milliLiter(s) (100 mL/Hr) IV Continuous <Continuous>  dextrose 50% Injectable 25 Gram(s) IV Push once  dextrose 50% Injectable 25 Gram(s) IV Push once  dextrose 50% Injectable 12.5 Gram(s) IV Push once  doxazosin 2 milliGRAM(s) Oral at bedtime  ertapenem  IVPB 1000 milliGRAM(s) IV Intermittent every 24 hours  escitalopram Solution 10 milliGRAM(s) Oral daily  glucagon  Injectable 1 milliGRAM(s) IntraMuscular once  heparin   Injectable 5000 Unit(s) SubCutaneous every 8 hours  hydrALAZINE 50 milliGRAM(s) Oral every 8 hours  insulin glargine Injectable (LANTUS) 12 Unit(s) SubCutaneous <User Schedule>  insulin lispro (ADMELOG) corrective regimen sliding scale   SubCutaneous every 6 hours  levETIRAcetam  Solution 500 milliGRAM(s) Oral two times a day  melatonin 6 milliGRAM(s) Oral at bedtime  pantoprazole   Suspension 40 milliGRAM(s) Oral every 12 hours  petrolatum Ophthalmic Ointment 1 Application(s) Left EYE at bedtime  polyethylene glycol 3350 17 Gram(s) Oral daily  senna Syrup 10 milliLiter(s) Oral at bedtime  sucralfate suspension 1 Gram(s) Enteral Tube two times a day  ticagrelor 60 milliGRAM(s) Oral every 12 hours    MEDICATIONS  (PRN):  acetaminophen   Oral Liquid .. 650 milliGRAM(s) Oral every 6 hours PRN Temp greater or equal to 38C (100.4F), Mild Pain (1 - 3), Moderate Pain (4 - 6)  dextrose Oral Gel 15 Gram(s) Oral once PRN Blood Glucose LESS THAN 70 milliGRAM(s)/deciliter  LORazepam   Injectable 0.5 milliGRAM(s) IV Push every 6 hours PRN Agitation      PHYSICAL EXAMINATION    LABS:                        9.5    9.22  )-----------( 469      ( 06 Mar 2024 06:10 )             30.5     03-06    137  |  97<L>  |  33<H>  ----------------------------<  187<H>  4.6   |  30  |  1.09    Ca    9.0      06 Mar 2024 06:10  Phos  2.7     03-06  Mg     2.30     03-06        Urinalysis Basic - ( 06 Mar 2024 06:10 )  Color: x / Appearance: x / SG: x / pH: x  Gluc: 187 mg/dL / Ketone: x  / Bili: x / Urobili: x   Blood: x / Protein: x / Nitrite: x   Leuk Esterase: x / RBC: x / WBC x   Sq Epi: x / Non Sq Epi: x / Bacteria: x            PAST MEDICAL & SURGICAL HISTORY:  Hyperlipemia      Hypertension      Coronary Artery Disease      Diabetes Mellitus Type II      Stented Coronary Artery  total 5 stents, last stent 5/2019      Neuropathy      Myocardial infarction      Stroke  mild left facial numbness   no other residuals verbalized      Myoclonic jerking      Stage 3 chronic kidney disease      History of Cataract Extraction      Hx of CABG      H/O coronary angiogram      S/P coronary artery stent placement  1/6/09      S/P placement of cardiac pacemaker          FAMILY HISTORY:  No pertinent family history in first degree relatives        Social History:  Lives with family  Ambulates with walker  Denies tobacco, EtOH, or illicit substance use (23 Dec 2023 22:51)      RADIOLOGY:  [ ] Reviewed and interpreted by me    PULMONARY FUNCTION TESTS:    EKG: CHIEF COMPLAINT: Patient is a 89y old  Male who presents with a chief complaint of COVID19, Chest pain (05 Mar 2024 14:05)      INTERVAL EVENTS:  - Bloody secretions from oral suction overnight,  however obvious sign of bleeding and likely old blood?   - Lasix 20 PO QD started   - Intermittent agitation noted     REVIEW OF SYSTEMS: Seen by bedside during AM rounds and unable to assess ROS as vented     Mode: CPAP with PS, FiO2: 30, PEEP: 5, PS: 10, MAP: 9      OBJECTIVE:  ICU Vital Signs Last 24 Hrs  T(C): 36.8 (06 Mar 2024 04:49), Max: 36.8 (05 Mar 2024 12:00)  T(F): 98.2 (06 Mar 2024 04:49), Max: 98.3 (05 Mar 2024 12:00)  HR: 84 (06 Mar 2024 07:07) (80 - 93)  BP: 135/66 (06 Mar 2024 04:49) (103/47 - 160/84)  BP(mean): --  ABP: --  ABP(mean): --  RR: 16 (06 Mar 2024 04:49) (16 - 24)  SpO2: 97% (06 Mar 2024 07:07) (91% - 99%)    O2 Parameters below as of 06 Mar 2024 04:49  Patient On (Oxygen Delivery Method): ventilator    O2 Concentration (%): 30      Mode: CPAP with PS, FiO2: 30, PEEP: 5, PS: 10, MAP: 9    03-05 @ 07:01  -  03-06 @ 07:00  --------------------------------------------------------  IN: 1300 mL / OUT: 1310 mL / NET: -10 mL      CAPILLARY BLOOD GLUCOSE  POCT Blood Glucose.: 179 mg/dL (06 Mar 2024 05:18)      HOSPITAL MEDICATIONS:  MEDICATIONS  (STANDING):  albuterol/ipratropium for Nebulization 3 milliLiter(s) Nebulizer every 12 hours  artificial  tears Solution 1 Drop(s) Left EYE four times a day  aspirin  chewable 81 milliGRAM(s) Enteral Tube daily  carvedilol 6.25 milliGRAM(s) Oral every 12 hours  chlorhexidine 0.12% Liquid 15 milliLiter(s) Oral Mucosa every 12 hours  chlorhexidine 2% Cloths 1 Application(s) Topical daily  dextrose 5%. 1000 milliLiter(s) (50 mL/Hr) IV Continuous <Continuous>  dextrose 5%. 1000 milliLiter(s) (100 mL/Hr) IV Continuous <Continuous>  dextrose 50% Injectable 25 Gram(s) IV Push once  dextrose 50% Injectable 25 Gram(s) IV Push once  dextrose 50% Injectable 12.5 Gram(s) IV Push once  doxazosin 2 milliGRAM(s) Oral at bedtime  ertapenem  IVPB 1000 milliGRAM(s) IV Intermittent every 24 hours  escitalopram Solution 10 milliGRAM(s) Oral daily  glucagon  Injectable 1 milliGRAM(s) IntraMuscular once  heparin   Injectable 5000 Unit(s) SubCutaneous every 8 hours  hydrALAZINE 50 milliGRAM(s) Oral every 8 hours  insulin glargine Injectable (LANTUS) 12 Unit(s) SubCutaneous <User Schedule>  insulin lispro (ADMELOG) corrective regimen sliding scale   SubCutaneous every 6 hours  levETIRAcetam  Solution 500 milliGRAM(s) Oral two times a day  melatonin 6 milliGRAM(s) Oral at bedtime  pantoprazole   Suspension 40 milliGRAM(s) Oral every 12 hours  petrolatum Ophthalmic Ointment 1 Application(s) Left EYE at bedtime  polyethylene glycol 3350 17 Gram(s) Oral daily  senna Syrup 10 milliLiter(s) Oral at bedtime  sucralfate suspension 1 Gram(s) Enteral Tube two times a day  ticagrelor 60 milliGRAM(s) Oral every 12 hours    MEDICATIONS  (PRN):  acetaminophen   Oral Liquid .. 650 milliGRAM(s) Oral every 6 hours PRN Temp greater or equal to 38C (100.4F), Mild Pain (1 - 3), Moderate Pain (4 - 6)  dextrose Oral Gel 15 Gram(s) Oral once PRN Blood Glucose LESS THAN 70 milliGRAM(s)/deciliter  LORazepam   Injectable 0.5 milliGRAM(s) IV Push every 6 hours PRN Agitation      PHYSICAL EXAMINATION  General: NAD   HEENT: Trach present   Cards: S1/S2, no murmurs   Pulm: Course vent sounds bilaterally with diminished bases (R>L) increased. No wheezes.   Abdomen: Soft, nondistended and nontender. BS (+) PEG present.   Extremities: Mild pedal edema bilaterally. GEORGE of BL upper > lower extremities when spoken to in native language but limited given agitation and ativan needed intermittently.  Neurology: Awaken with eyes open and intermittently follows commands when spoken to in native language but limited by weakness with no acute focal neurological deficits    LABS:                        9.5    9.22  )-----------( 469      ( 06 Mar 2024 06:10 )             30.5     03-06    137  |  97<L>  |  33<H>  ----------------------------<  187<H>  4.6   |  30  |  1.09    Ca    9.0      06 Mar 2024 06:10  Phos  2.7     03-06  Mg     2.30     03-06        Urinalysis Basic - ( 06 Mar 2024 06:10 )  Color: x / Appearance: x / SG: x / pH: x  Gluc: 187 mg/dL / Ketone: x  / Bili: x / Urobili: x   Blood: x / Protein: x / Nitrite: x   Leuk Esterase: x / RBC: x / WBC x   Sq Epi: x / Non Sq Epi: x / Bacteria: x            PAST MEDICAL & SURGICAL HISTORY:  Hyperlipemia      Hypertension      Coronary Artery Disease      Diabetes Mellitus Type II      Stented Coronary Artery  total 5 stents, last stent 5/2019      Neuropathy      Myocardial infarction      Stroke  mild left facial numbness   no other residuals verbalized      Myoclonic jerking      Stage 3 chronic kidney disease      History of Cataract Extraction      Hx of CABG      H/O coronary angiogram      S/P coronary artery stent placement  1/6/09      S/P placement of cardiac pacemaker          FAMILY HISTORY:  No pertinent family history in first degree relatives        Social History:  Lives with family  Ambulates with walker  Denies tobacco, EtOH, or illicit substance use (23 Dec 2023 22:51)      RADIOLOGY:  [ ] Reviewed and interpreted by me    PULMONARY FUNCTION TESTS:    EKG:

## 2024-03-06 NOTE — PROGRESS NOTE ADULT - SUBJECTIVE AND OBJECTIVE BOX
NEPHROLOGY-[facility] [phone]        Patient seen and examined trach to vent          MEDICATIONS  (STANDING):  albuterol/ipratropium for Nebulization 3 milliLiter(s) Nebulizer every 12 hours  artificial  tears Solution 1 Drop(s) Left EYE four times a day  aspirin  chewable 81 milliGRAM(s) Enteral Tube daily  carvedilol 6.25 milliGRAM(s) Oral every 12 hours  chlorhexidine 0.12% Liquid 15 milliLiter(s) Oral Mucosa every 12 hours  chlorhexidine 2% Cloths 1 Application(s) Topical daily  dextrose 5%. 1000 milliLiter(s) (100 mL/Hr) IV Continuous <Continuous>  dextrose 5%. 1000 milliLiter(s) (50 mL/Hr) IV Continuous <Continuous>  dextrose 50% Injectable 25 Gram(s) IV Push once  dextrose 50% Injectable 25 Gram(s) IV Push once  dextrose 50% Injectable 12.5 Gram(s) IV Push once  doxazosin 2 milliGRAM(s) Oral at bedtime  ertapenem  IVPB 1000 milliGRAM(s) IV Intermittent every 24 hours  escitalopram Solution 10 milliGRAM(s) Oral daily  glucagon  Injectable 1 milliGRAM(s) IntraMuscular once  heparin   Injectable 5000 Unit(s) SubCutaneous every 8 hours  hydrALAZINE 50 milliGRAM(s) Oral every 8 hours  insulin glargine Injectable (LANTUS) 12 Unit(s) SubCutaneous <User Schedule>  insulin lispro (ADMELOG) corrective regimen sliding scale   SubCutaneous every 6 hours  levETIRAcetam  Solution 500 milliGRAM(s) Oral two times a day  melatonin 6 milliGRAM(s) Oral at bedtime  pantoprazole   Suspension 40 milliGRAM(s) Oral every 12 hours  petrolatum Ophthalmic Ointment 1 Application(s) Left EYE at bedtime  polyethylene glycol 3350 17 Gram(s) Oral daily  senna Syrup 10 milliLiter(s) Oral at bedtime  sucralfate suspension 1 Gram(s) Enteral Tube two times a day  ticagrelor 60 milliGRAM(s) Oral every 12 hours      VITAL:  T(C): , Max: 37.1 (03-04-24 @ 04:00)  T(F): , Max: 98.7 (03-04-24 @ 04:00)  HR: 88 (03-04-24 @ 11:09)  BP: 148/77 (03-04-24 @ 08:00)  BP(mean): 98 (03-04-24 @ 08:00)  RR: 17 (03-04-24 @ 08:00)  SpO2: 97% (03-04-24 @ 11:09)  Wt(kg): --    I and O's:    03-03 @ 07:01  -  03-04 @ 07:00  --------------------------------------------------------  IN: 2200 mL / OUT: 2625 mL / NET: -425 mL          PHYSICAL EXAM:  Constitutional: trach to vent   HEENT: +trach  Respiratory: coarse bs   Cardiovascular: normal s1s2  Gastrointestinal: BS+, soft, NT/ND +PEG  Extremities:+ peripheral edema  :  + external catheter   Skin: No rashes    LABS:                        8.4    6.51  )-----------( 401      ( 03 Mar 2024 05:40 )             26.2     03-03    135  |  94<L>  |  36<H>  ----------------------------<  175<H>  4.0   |  30  |  1.20    Ca    8.3<L>      03 Mar 2024 06:35  Phos  3.3     03-03  Mg     2.20     03-03            Urine Studies:  Urinalysis Basic - ( 03 Mar 2024 06:35 )    Color: x / Appearance: x / SG: x / pH: x  Gluc: 175 mg/dL / Ketone: x  / Bili: x / Urobili: x   Blood: x / Protein: x / Nitrite: x   Leuk Esterase: x / RBC: x / WBC x   Sq Epi: x / Non Sq Epi: x / Bacteria: x    IMPRESSION:  89M w/ DM2, HTN, CVA, PAD, CKD3, and CAD-CABG, 12/23/23 p/w COVID19 infection, c/b respiratory failure/hypotension; now s/p tracheostomy    (1)Renal - CKD3; superimposed mild prerenal azotemia - numbers fluctuating based on hemodynamic status  (2)CV - now off pressors and midodrine, BP improved/stable    (3)Pulm - vent-dependent   (4)ID - afebrile, s/p zosyn   (5)GI - s/p PEG (2/20)  (6)Hypernatremia - possibly due to diuresis, NA+ improved     RECOMMEND:   (1)cont lasix prn for hypervolemia   (2)flush PEG as needed   (3)Continue meds for GFR 30-40ml/min        [facility]  [facility]  Office: [phone]       NEPHROLOGY-[facility] [phone]        Patient seen and examined trach to vent          MEDICATIONS  (STANDING):  albuterol/ipratropium for Nebulization 3 milliLiter(s) Nebulizer every 12 hours  artificial  tears Solution 1 Drop(s) Left EYE four times a day  aspirin  chewable 81 milliGRAM(s) Enteral Tube daily  carvedilol 6.25 milliGRAM(s) Oral every 12 hours  chlorhexidine 0.12% Liquid 15 milliLiter(s) Oral Mucosa every 12 hours  chlorhexidine 2% Cloths 1 Application(s) Topical daily  dextrose 5%. 1000 milliLiter(s) (100 mL/Hr) IV Continuous <Continuous>  dextrose 5%. 1000 milliLiter(s) (50 mL/Hr) IV Continuous <Continuous>  dextrose 50% Injectable 25 Gram(s) IV Push once  dextrose 50% Injectable 25 Gram(s) IV Push once  dextrose 50% Injectable 12.5 Gram(s) IV Push once  doxazosin 2 milliGRAM(s) Oral at bedtime  ertapenem  IVPB 1000 milliGRAM(s) IV Intermittent every 24 hours  escitalopram Solution 10 milliGRAM(s) Oral daily  glucagon  Injectable 1 milliGRAM(s) IntraMuscular once  heparin   Injectable 5000 Unit(s) SubCutaneous every 8 hours  hydrALAZINE 50 milliGRAM(s) Oral every 8 hours  insulin glargine Injectable (LANTUS) 12 Unit(s) SubCutaneous <User Schedule>  insulin lispro (ADMELOG) corrective regimen sliding scale   SubCutaneous every 6 hours  levETIRAcetam  Solution 500 milliGRAM(s) Oral two times a day  melatonin 6 milliGRAM(s) Oral at bedtime  pantoprazole   Suspension 40 milliGRAM(s) Oral every 12 hours  petrolatum Ophthalmic Ointment 1 Application(s) Left EYE at bedtime  polyethylene glycol 3350 17 Gram(s) Oral daily  senna Syrup 10 milliLiter(s) Oral at bedtime  sucralfate suspension 1 Gram(s) Enteral Tube two times a day  ticagrelor 60 milliGRAM(s) Oral every 12 hours      VITAL:  T(C): , Max: 37.1 (03-04-24 @ 04:00)  T(F): , Max: 98.7 (03-04-24 @ 04:00)  HR: 88 (03-04-24 @ 11:09)  BP: 148/77 (03-04-24 @ 08:00)  BP(mean): 98 (03-04-24 @ 08:00)  RR: 17 (03-04-24 @ 08:00)  SpO2: 97% (03-04-24 @ 11:09)  Wt(kg): --    I and O's:    03-03 @ 07:01  -  03-04 @ 07:00  --------------------------------------------------------  IN: 2200 mL / OUT: 2625 mL / NET: -425 mL          PHYSICAL EXAM:  Constitutional: trach to vent   HEENT: +trach  Respiratory: coarse bs   Cardiovascular: normal s1s2  Gastrointestinal: BS+, soft, NT/ND +PEG  Extremities:+ peripheral edema  :  + external catheter   Skin: No rashes    LABS:                        8.4    6.51  )-----------( 401      ( 03 Mar 2024 05:40 )             26.2     03-03    135  |  94<L>  |  36<H>  ----------------------------<  175<H>  4.0   |  30  |  1.20    Ca    8.3<L>      03 Mar 2024 06:35  Phos  3.3     03-03  Mg     2.20     03-03            Urine Studies:  Urinalysis Basic - ( 03 Mar 2024 06:35 )    Color: x / Appearance: x / SG: x / pH: x  Gluc: 175 mg/dL / Ketone: x  / Bili: x / Urobili: x   Blood: x / Protein: x / Nitrite: x   Leuk Esterase: x / RBC: x / WBC x   Sq Epi: x / Non Sq Epi: x / Bacteria: x    IMPRESSION:  89M w/ DM2, HTN, CVA, PAD, CKD3, and CAD-CABG, 12/23/23 p/w COVID19 infection, c/b respiratory failure/hypotension; now s/p tracheostomy    (1)Renal - CKD3; superimposed mild prerenal azotemia - numbers fluctuating based on hemodynamic status  (2)CV - now off pressors and midodrine, BP improved/stable    (3)Pulm - vent-dependent   (4)ID - afebrile, s/p zosyn   (5)GI - s/p PEG (2/20)  (6)Hypernatremia - possibly due to diuresis, NA+ improved     RECOMMEND:   (1)a-w lasix 20 mg daily   (2)flush PEG as needed   (3)Continue meds for GFR 30-40ml/min  (4) give aranesp 60 mcg Sq once today   (5) free water 250 ml x6h  daily  via PEG       Elsa Nunez  [facility]  Office: [phone]

## 2024-03-07 LAB
ANION GAP SERPL CALC-SCNC: 12 MMOL/L — SIGNIFICANT CHANGE UP (ref 7–14)
BLOOD GAS ARTERIAL COMPREHENSIVE RESULT: SIGNIFICANT CHANGE UP
BUN SERPL-MCNC: 35 MG/DL — HIGH (ref 7–23)
CALCIUM SERPL-MCNC: 9.1 MG/DL — SIGNIFICANT CHANGE UP (ref 8.4–10.5)
CHLORIDE SERPL-SCNC: 98 MMOL/L — SIGNIFICANT CHANGE UP (ref 98–107)
CO2 SERPL-SCNC: 30 MMOL/L — SIGNIFICANT CHANGE UP (ref 22–31)
CREAT SERPL-MCNC: 1.14 MG/DL — SIGNIFICANT CHANGE UP (ref 0.5–1.3)
EGFR: 61 ML/MIN/1.73M2 — SIGNIFICANT CHANGE UP
GLUCOSE BLDC GLUCOMTR-MCNC: 174 MG/DL — HIGH (ref 70–99)
GLUCOSE BLDC GLUCOMTR-MCNC: 195 MG/DL — HIGH (ref 70–99)
GLUCOSE BLDC GLUCOMTR-MCNC: 214 MG/DL — HIGH (ref 70–99)
GLUCOSE BLDC GLUCOMTR-MCNC: 217 MG/DL — HIGH (ref 70–99)
GLUCOSE BLDC GLUCOMTR-MCNC: 261 MG/DL — HIGH (ref 70–99)
GLUCOSE SERPL-MCNC: 155 MG/DL — HIGH (ref 70–99)
HCT VFR BLD CALC: 29.2 % — LOW (ref 39–50)
HGB BLD-MCNC: 9 G/DL — LOW (ref 13–17)
MAGNESIUM SERPL-MCNC: 2.2 MG/DL — SIGNIFICANT CHANGE UP (ref 1.6–2.6)
MCHC RBC-ENTMCNC: 29.5 PG — SIGNIFICANT CHANGE UP (ref 27–34)
MCHC RBC-ENTMCNC: 30.8 GM/DL — LOW (ref 32–36)
MCV RBC AUTO: 95.7 FL — SIGNIFICANT CHANGE UP (ref 80–100)
NRBC # BLD: 0 /100 WBCS — SIGNIFICANT CHANGE UP (ref 0–0)
NRBC # FLD: 0 K/UL — SIGNIFICANT CHANGE UP (ref 0–0)
PHOSPHATE SERPL-MCNC: 2.5 MG/DL — SIGNIFICANT CHANGE UP (ref 2.5–4.5)
PLATELET # BLD AUTO: 467 K/UL — HIGH (ref 150–400)
POTASSIUM SERPL-MCNC: 4.8 MMOL/L — SIGNIFICANT CHANGE UP (ref 3.5–5.3)
POTASSIUM SERPL-SCNC: 4.8 MMOL/L — SIGNIFICANT CHANGE UP (ref 3.5–5.3)
RBC # BLD: 3.05 M/UL — LOW (ref 4.2–5.8)
RBC # FLD: 16.2 % — HIGH (ref 10.3–14.5)
SODIUM SERPL-SCNC: 140 MMOL/L — SIGNIFICANT CHANGE UP (ref 135–145)
WBC # BLD: 8.91 K/UL — SIGNIFICANT CHANGE UP (ref 3.8–10.5)
WBC # FLD AUTO: 8.91 K/UL — SIGNIFICANT CHANGE UP (ref 3.8–10.5)

## 2024-03-07 PROCEDURE — 99239 HOSP IP/OBS DSCHRG MGMT >30: CPT

## 2024-03-07 PROCEDURE — 71045 X-RAY EXAM CHEST 1 VIEW: CPT | Mod: 26

## 2024-03-07 RX ADMIN — Medication 1 DROP(S): at 00:52

## 2024-03-07 RX ADMIN — LEVETIRACETAM 500 MILLIGRAM(S): 250 TABLET, FILM COATED ORAL at 05:07

## 2024-03-07 RX ADMIN — CHLORHEXIDINE GLUCONATE 15 MILLILITER(S): 213 SOLUTION TOPICAL at 17:17

## 2024-03-07 RX ADMIN — SENNA PLUS 10 MILLILITER(S): 8.6 TABLET ORAL at 21:45

## 2024-03-07 RX ADMIN — Medication 20 MILLIGRAM(S): at 05:15

## 2024-03-07 RX ADMIN — Medication 50 MILLIGRAM(S): at 21:44

## 2024-03-07 RX ADMIN — PANTOPRAZOLE SODIUM 40 MILLIGRAM(S): 20 TABLET, DELAYED RELEASE ORAL at 05:06

## 2024-03-07 RX ADMIN — Medication 6 MILLIGRAM(S): at 21:45

## 2024-03-07 RX ADMIN — Medication 50 MILLIGRAM(S): at 13:42

## 2024-03-07 RX ADMIN — LEVETIRACETAM 500 MILLIGRAM(S): 250 TABLET, FILM COATED ORAL at 17:17

## 2024-03-07 RX ADMIN — Medication 2 MILLIGRAM(S): at 21:46

## 2024-03-07 RX ADMIN — CARVEDILOL PHOSPHATE 6.25 MILLIGRAM(S): 80 CAPSULE, EXTENDED RELEASE ORAL at 17:16

## 2024-03-07 RX ADMIN — Medication 0.5 MILLIGRAM(S): at 02:29

## 2024-03-07 RX ADMIN — Medication 1 DROP(S): at 17:17

## 2024-03-07 RX ADMIN — Medication 3 MILLILITER(S): at 21:29

## 2024-03-07 RX ADMIN — Medication 1 GRAM(S): at 05:06

## 2024-03-07 RX ADMIN — HEPARIN SODIUM 5000 UNIT(S): 5000 INJECTION INTRAVENOUS; SUBCUTANEOUS at 13:42

## 2024-03-07 RX ADMIN — Medication 4: at 00:52

## 2024-03-07 RX ADMIN — CHLORHEXIDINE GLUCONATE 15 MILLILITER(S): 213 SOLUTION TOPICAL at 05:08

## 2024-03-07 RX ADMIN — ESCITALOPRAM OXALATE 10 MILLIGRAM(S): 10 TABLET, FILM COATED ORAL at 11:36

## 2024-03-07 RX ADMIN — CARVEDILOL PHOSPHATE 6.25 MILLIGRAM(S): 80 CAPSULE, EXTENDED RELEASE ORAL at 05:07

## 2024-03-07 RX ADMIN — Medication 1 GRAM(S): at 17:17

## 2024-03-07 RX ADMIN — Medication 6: at 18:11

## 2024-03-07 RX ADMIN — Medication 1 DROP(S): at 05:09

## 2024-03-07 RX ADMIN — PANTOPRAZOLE SODIUM 40 MILLIGRAM(S): 20 TABLET, DELAYED RELEASE ORAL at 17:17

## 2024-03-07 RX ADMIN — TICAGRELOR 60 MILLIGRAM(S): 90 TABLET ORAL at 17:17

## 2024-03-07 RX ADMIN — Medication 3 MILLILITER(S): at 09:02

## 2024-03-07 RX ADMIN — HEPARIN SODIUM 5000 UNIT(S): 5000 INJECTION INTRAVENOUS; SUBCUTANEOUS at 05:08

## 2024-03-07 RX ADMIN — POLYETHYLENE GLYCOL 3350 17 GRAM(S): 17 POWDER, FOR SOLUTION ORAL at 11:37

## 2024-03-07 RX ADMIN — Medication 1 DROP(S): at 11:39

## 2024-03-07 RX ADMIN — Medication 50 MILLIGRAM(S): at 05:07

## 2024-03-07 RX ADMIN — Medication 4: at 11:47

## 2024-03-07 RX ADMIN — INSULIN GLARGINE 12 UNIT(S): 100 INJECTION, SOLUTION SUBCUTANEOUS at 11:50

## 2024-03-07 RX ADMIN — TICAGRELOR 60 MILLIGRAM(S): 90 TABLET ORAL at 05:06

## 2024-03-07 RX ADMIN — Medication 2: at 05:11

## 2024-03-07 RX ADMIN — CHLORHEXIDINE GLUCONATE 1 APPLICATION(S): 213 SOLUTION TOPICAL at 11:36

## 2024-03-07 RX ADMIN — HEPARIN SODIUM 5000 UNIT(S): 5000 INJECTION INTRAVENOUS; SUBCUTANEOUS at 21:46

## 2024-03-07 RX ADMIN — Medication 1 APPLICATION(S): at 21:45

## 2024-03-07 RX ADMIN — Medication 81 MILLIGRAM(S): at 11:36

## 2024-03-07 NOTE — CHART NOTE - NSCHARTNOTEFT_GEN_A_CORE
RCU PA NOTE     Called by RN for patient with desaturation on CPAP 10/5. Seen by bedside with SPO2 84 and ambu bagged by RN with improvement. Suctioned with mild secretions intratracheal and copious thin clear secretions orally. Placed back on AC/VC vent with tolerance. CXR ordered and ABG WNL. Patient likely with mucous plug vs tired out on SBT. Will continue to monitor.     ALISTAIR Thomas, PA-C  Department of Medicine/ RCU  In house RCU [place] [id number]  In house Medicine [facility] [id number]  Reachable via teams

## 2024-03-07 NOTE — PROGRESS NOTE ADULT - SUBJECTIVE AND OBJECTIVE BOX
Aashish Boyer MD  Interventional Cardiology / Advance Heart Failure and Cardiac Transplant Specialist  [place] Office : [address] [place] [id number]  Tel:   [place] Office : [address] [facility] [address]  Tel: [phone]      Subjective/Overnight events: Pt is lying in bed not in distress  	  MEDICATIONS:  aspirin  chewable 81 milliGRAM(s) Enteral Tube daily  carvedilol 6.25 milliGRAM(s) Oral every 12 hours  doxazosin 2 milliGRAM(s) Oral at bedtime  furosemide    Tablet 20 milliGRAM(s) Oral daily  heparin   Injectable 5000 Unit(s) SubCutaneous every 8 hours  hydrALAZINE 50 milliGRAM(s) Oral every 8 hours  ticagrelor 60 milliGRAM(s) Oral every 12 hours      albuterol/ipratropium for Nebulization 3 milliLiter(s) Nebulizer every 12 hours    acetaminophen   Oral Liquid .. 650 milliGRAM(s) Oral every 6 hours PRN  escitalopram Solution 10 milliGRAM(s) Oral daily  levETIRAcetam  Solution 500 milliGRAM(s) Oral two times a day  LORazepam     Tablet 0.5 milliGRAM(s) Oral every 6 hours PRN  melatonin 6 milliGRAM(s) Oral at bedtime    pantoprazole   Suspension 40 milliGRAM(s) Oral every 12 hours  polyethylene glycol 3350 17 Gram(s) Oral daily  senna Syrup 10 milliLiter(s) Oral at bedtime  sucralfate suspension 1 Gram(s) Enteral Tube two times a day    dextrose 50% Injectable 25 Gram(s) IV Push once  dextrose 50% Injectable 12.5 Gram(s) IV Push once  dextrose 50% Injectable 25 Gram(s) IV Push once  dextrose Oral Gel 15 Gram(s) Oral once PRN  glucagon  Injectable 1 milliGRAM(s) IntraMuscular once  insulin glargine Injectable (LANTUS) 12 Unit(s) SubCutaneous <User Schedule>  insulin lispro (ADMELOG) corrective regimen sliding scale   SubCutaneous every 6 hours    artificial  tears Solution 1 Drop(s) Left EYE four times a day  chlorhexidine 0.12% Liquid 15 milliLiter(s) Oral Mucosa every 12 hours  chlorhexidine 2% Cloths 1 Application(s) Topical daily  dextrose 5%. 1000 milliLiter(s) IV Continuous <Continuous>  dextrose 5%. 1000 milliLiter(s) IV Continuous <Continuous>  petrolatum Ophthalmic Ointment 1 Application(s) Left EYE at bedtime      PAST MEDICAL/SURGICAL HISTORY  PAST MEDICAL & SURGICAL HISTORY:  Hyperlipemia      Hypertension      Coronary Artery Disease      Diabetes Mellitus Type II      Stented Coronary Artery  total 5 stents, last stent 5/2019      Neuropathy      Myocardial infarction      Stroke  mild left facial numbness   no other residuals verbalized      Myoclonic jerking      Stage 3 chronic kidney disease      History of Cataract Extraction      Hx of CABG      H/O coronary angiogram      S/P coronary artery stent placement  1/6/09      S/P placement of cardiac pacemaker          SOCIAL HISTORY: Substance Use (street drugs): ( x ) never used  (  ) other:    FAMILY HISTORY:  No pertinent family history in first degree relatives          PHYSICAL EXAM:  T(C): 36.1 (03-07-24 @ 13:40), Max: 36.7 (03-06-24 @ 16:00)  HR: 93 (03-07-24 @ 13:40) (82 - 94)  BP: 156/73 (03-07-24 @ 13:40) (117/55 - 156/73)  RR: 27 (03-07-24 @ 13:40) (18 - 27)  SpO2: 96% (03-07-24 @ 13:40) (94% - 100%)  Wt(kg): --  I&O's Summary    06 Mar 2024 07:01  -  07 Mar 2024 07:00  --------------------------------------------------------  IN: 1300 mL / OUT: 1465 mL / NET: -165 mL        GENERAL: NAD  EYES:  conjunctiva and sclera clear  ENMT: s/p trach  Cardiovascular: Normal S1 S2, No JVD, No murmurs, No edema  Respiratory: Lungs clear to auscultation	  Gastrointestinal:  Soft, s/p PEG  Extremities: + LE edema                                     9.0    8.91  )-----------( 467      ( 07 Mar 2024 05:39 )             29.2     03-07    140  |  98  |  35<H>  ----------------------------<  155<H>  4.8   |  30  |  1.14    Ca    9.1      07 Mar 2024 05:39  Phos  2.5     03-07  Mg     2.20     03-07      proBNP:   Lipid Profile:   HgA1c:   TSH:     Consultant(s) Notes Reviewed:  [x ] YES  [ ] NO    Care Discussed with Consultants/Other Providers [ x] YES  [ ] NO    Imaging Personally Reviewed independently:  [x] YES  [ ] NO    All labs, radiologic studies, vitals, orders and medications list reviewed. Patient is seen and examined at bedside. Case discussed with medical team.

## 2024-03-07 NOTE — PROGRESS NOTE ADULT - NS ATTEND AMEND GEN_ALL_CORE FT
agree with above  pt is medically stable discharge  d/c pt's son, Said, and daughter in law  he is on our weaning protocol, weaning should be continued at the facility based on their protocol  prn ativan 0.5   lasix 20 daily    time spent on d/c >30

## 2024-03-07 NOTE — PROGRESS NOTE ADULT - ASSESSMENT
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 Course ultimately complicated by progressive WOB and O2 demand and patient intubated and transferred to MICU (1/22). Course further complicated by acute cholecystitis and s/p Perc Lynsey with IR on (1/26), HFrEF 38, pulmonary edema, superimposed PNA, failed extubation failed and prolonged vent time and s/p trach (2/13). Patient transferred to RCU (2/16) and s/p PEG (2/20). Course complicated by volume overload and diuresis and GDMT continued and HFrEF 45 with improvement    NEUROLOGY   # Encephalopathy   - Hx of CVA with no residual deficits per family, however per family worsening confusion at home over the past 6 months (becoming disoriented to time) but prior to admission has been more confused, talking about seeing people that are not present.  - CTH (1/31) with no evidence of acute infarct  - Continue on ASA and Brilinta  - Holding Neurontin, Memantine and Oxycodone in setting of somnolence    # ICA stenosis   - CTA NECK (1/31) with moderate to severe narrowing left internal carotid at the origin. Severe narrowing right internal carotid by a tandem lesion 1.5 cm above the bifurcation with a narrowed internal carotid beyond the lesion. Extensive calcified plaque both cavernous and supraclinoid carotid arteries with narrowing but no occlusion. Mild narrowing proximal right M1 segment. Moderate narrowing both vertebral V4 segments. No perfusion abnormality at the Tmax 6 second threshold, but moderate hypoperfusion in the right MCA territory at the 4 second threshold.   - Continue on ASA and Brilinta    # Myoclonic jerks   - Hx of myoclonic jerks post CVAs   - EEG (1/18-2/6) negative for seizures  - Continue on Keppra and per neuro wishes to wean, however family wishes to keep current dose as home medication.     # Depression/ Insomnia  - Continue on Lexapro per home medication   - Course complicated by agitation and pulling on tubes   - Continue on Seroquel 25 QHS but QTC prolonged and now held   - Continue on Ativan PRN  - Supportive care     CARDIOLOGY   # Vasoplegic vs septic shock  # Hx of HTN   - Weaned off pressors in ICU and now with mild hypertension   - Continue on coreg and hydral as below   - Strict BP control given moderate AS    # AFIB RVR   # Bifascicular Block with Medtronic PPM   - AFIB RVR episodes and PPM interrogated (2/20) by EP with similar episodes  - Previous admission in 6/2022 with cardiogenic syncope second to bifasicular block, however now with PPM and AV ymron blockers ok.   - Continue on lopressor 12.5mg BID however replaced with coreg second to HFrEF   - AC discussed at length however with recent GIB will hold for now     # HFrEF 38 likely stress induced?   # Mild to moderate AS   - ECHO (12/2023) with EF 62 with normal LVSF and mild to moderate AS   - ECHO (2/2024) with EF 38, moderate LVSD with global LV hypokinesis, mildly reduced RVSF (TAPSE 1.3), mild to moderate MR, mild AR, and moderate AS.   - RPT ECHO (3/4) with EF 45 and mild to moderate LVSD   - Continue on lasix 20 QD, coreg 6.25 and hydralazine 50 (increased 3/4)   - Hold isordil and up titrate above medications first     # CAD with CABG   - CATH (5/2022) with dLAD 70, SVG graft to OM1 with 90 in stent stenosis s/p PCI and GEMMA placement, and LIMA graft to mLAD with no disease.   - Continue on ASA and brilinta    # Prolonged QTC   - ECG (3/2) with QTC 616ms (corrected using bazzet 490ms)   - ECG (3/3) with QTC 634ms (corrected using bazzet 448ms)   - Monitor off/ avoid QTC prolonging agents for now    RESPIRATORY   # Acute respiratory failure second to SARS-COV-2 vs pulm edema vs PNA  - Presented with COVID complicated by sepsis second to acute lynsey and worsening respiratory failure second to pulmonary edema requiring intubation.   - Prolonged intubation and s/p tracheostomy (2/13)   - CT CHEST 1/16 with new small bilateral pleural effusions/ atelectasis/ patchy bilateral ground-glass opacities are consistent with pulmonary edema.  - Completed ABX and COVID regimen as below   - Continue on vent and initially tolerating some SBT 10/5 today   - Continue on nebs and hold further HTS given intermittent hemoptysis  - Continue on diuresis as above    # Mild hemoptysis likely from suction trauma   - s/p bronch (2/18) with no active bleed  - Hemoptysis improved with TXA and holding further HTS as above   - Tiny hemoptysis second to suction trauma imporving  - Careful with suctioning     HEENT   # L eye subconjunctival hemorrhage   - Continue on artifical tears and Lacrilube   - Optho eval appreciated     GI  # Nutrition/ Dysphagia   - PEG placed by GI on 2/20 and tube feeds continued.   - Loose stools and Banatrol continued     # UGIB   - EGD (1/2) with esophagitis and bleeding Dieulafoy's lesion s/p clips.   - Continue on PPI and carafate     # SMA calcification   - CTA demonstrating mesenteric fat stranding associated with ascending/transverse colon  - Diagnostic laparoscopy (12/24) with small bowel and visible colon viable, some inflammation of omentum in RUQ  - SMA Angiogram and stent placed (12/25).   - Continue on ASA and Brilinta     # Acute Cholecystitis   - CT (12/26) with distended GB with cholelithiasis and sludge   - HIDA (12/29) POSITIVE for acute cholecystitis   - Case discussed with IR at length and s/p PERC LYNSEY (1/26)   - Completed zosyn course (12/24-12/31)   - PERC LYNSEY tube to stay in until surgical candidate for cholecystectomy to prevent recurrence     / RENAL   # CKD   - CRE at baseline   - Monitor renal function and UOP     # Hypernatremia   -  Q4H added however sodium downtrending and FWF dc'ed 3/4   - Monitor closely with diuresis as above     INFECTIOUS DISEASE   # COVID with superimposed PNA   # ESBL Kleb PNA   - COVID POSITIVE (12/23) and completed remdesivir x 1 dose and paxlovid course.   - WOB worsened as above requiring intubation and cultures negative. Zosyn completed empirically however hypothermic (2/11) and BCx, UA, RVP and BAL negative.   - Completed additional zosyn (2/12-2/19) empirically   - Fever spike and thick secretions and SCX (2/26) with ESBL klebs.   - s/p Zosyn (2/27 - 2/29) but resistant and changed to Erta (2/29 - 3/6)     HEME  # AOCD   - Monitor HH   - DVT PPX with HSQ     ENDOCRINE   # DM2 A1C 9.3   - Continue on Lantus 12 and ISS     SKIN/ TUBES   - Trach (2/13)   - PEG (2/20)   - PERC LYNSEY (1/26 - )     ETHICS   - FULL CODE     DISPO - vent facility and family aware. Family visiting facilities.  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 Course ultimately complicated by progressive WOB and O2 demand and patient intubated and transferred to MICU (1/22). Course further complicated by acute cholecystitis and s/p Perc Lynsey with IR on (1/26), HFrEF 38, pulmonary edema, superimposed PNA, failed extubation failed and prolonged vent time and s/p trach (2/13). Patient transferred to RCU (2/16) and s/p PEG (2/20). Course complicated by volume overload and diuresis and GDMT continued and HFrEF 45 with improvement    NEUROLOGY   # Encephalopathy   - Hx of CVA with no residual deficits per family, however per family worsening confusion at home over the past 6 months (becoming disoriented to time) but prior to admission has been more confused, talking about seeing people that are not present.  - CTH (1/31) with no evidence of acute infarct  - Continue on ASA and Brilinta  - Holding Neurontin, Memantine and Oxycodone in setting of somnolence    # ICA stenosis   - CTA NECK (1/31) with moderate to severe narrowing left internal carotid at the origin. Severe narrowing right internal carotid by a tandem lesion 1.5 cm above the bifurcation with a narrowed internal carotid beyond the lesion. Extensive calcified plaque both cavernous and supraclinoid carotid arteries with narrowing but no occlusion. Mild narrowing proximal right M1 segment. Moderate narrowing both vertebral V4 segments. No perfusion abnormality at the Tmax 6 second threshold, but moderate hypoperfusion in the right MCA territory at the 4 second threshold.   - Continue on ASA and Brilinta    # Myoclonic jerks   - Hx of myoclonic jerks post CVAs   - EEG (1/18-2/6) negative for seizures  - Continue on Keppra and per neuro wishes to wean, however family wishes to keep current dose as home medication.     # Depression/ Insomnia  - Continue on Lexapro per home medication   - Course complicated by agitation and pulling on tubes   - Continue on Seroquel but QTC prolonged and now held   - Continue on Ativan 0.5mg PO Q6H PRN  - Supportive care     CARDIOLOGY   # Vasoplegic vs septic shock  # Hx of HTN   - Weaned off pressors in ICU and now with mild hypertension   - Continue on coreg and hydral as below   - Strict BP control given moderate AS    # AFIB RVR   # Bifascicular Block with Medtronic PPM   - AFIB RVR episodes and PPM interrogated (2/20) by EP with similar episodes  - Previous admission in 6/2022 with cardiogenic syncope second to bifasicular block, however now with PPM and AV myron blockers ok.   - Continue on lopressor 12.5mg BID however replaced with coreg second to HFrEF   - AC discussed at length however with recent GIB will hold for now     # HFrEF 38 likely stress induced?   # Mild to moderate AS   - ECHO (12/2023) with EF 62 with normal LVSF and mild to moderate AS   - ECHO (2/2024) with EF 38, moderate LVSD with global LV hypokinesis, mildly reduced RVSF (TAPSE 1.3), mild to moderate MR, mild AR, and moderate AS.   - RPT ECHO (3/4) with EF 45 and mild to moderate LVSD   - Continue on lasix 20 QD, coreg 6.25 and hydralazine 50 (increased 3/4)   - Hold isordil and up titrate above medications first     # CAD with CABG   - CATH (5/2022) with dLAD 70, SVG graft to OM1 with 90 in stent stenosis s/p PCI and GEMMA placement, and LIMA graft to mLAD with no disease.   - Continue on ASA and brilinta    # Prolonged QTC   - ECG (3/2) with QTC 616ms (corrected using bazzet 490ms)   - ECG (3/3) with QTC 634ms (corrected using bazzet 490ms)   - Monitor off/ avoid QTC prolonging agents for now    RESPIRATORY   # Acute respiratory failure second to SARS-COV-2 vs pulm edema vs PNA  - Presented with COVID complicated by sepsis second to acute lynsey and worsening respiratory failure second to pulmonary edema requiring intubation.   - Prolonged intubation and s/p tracheostomy (2/13)   - CT CHEST 1/16 with new small bilateral pleural effusions/ atelectasis/ patchy bilateral ground-glass opacities are consistent with pulmonary edema.  - Completed ABX and COVID regimen as below   - Continue on vent and initially tolerating some SBT 10/5 today   - Continue on nebs and hold further HTS given intermittent hemoptysis  - Continue on diuresis as above    # Mild hemoptysis likely from suction trauma   - s/p bronch (2/18) with no active bleed  - Hemoptysis improved with TXA and holding further HTS as above   - Tiny hemoptysis second to suction trauma imporving  - Careful with suctioning     HEENT   # L eye subconjunctival hemorrhage   - Continue on artifical tears and Lacrilube   - Optho eval appreciated     GI  # Nutrition/ Dysphagia   - PEG placed by GI on 2/20 and tube feeds continued.   - Loose stools and Banatrol continued     # UGIB   - EGD (1/2) with esophagitis and bleeding Dieulafoy's lesion s/p clips.   - Continue on PPI and carafate     # SMA calcification   - CTA demonstrating mesenteric fat stranding associated with ascending/transverse colon  - Diagnostic laparoscopy (12/24) with small bowel and visible colon viable, some inflammation of omentum in RUQ  - SMA Angiogram and stent placed (12/25).   - Continue on ASA and Brilinta     # Acute Cholecystitis   - CT (12/26) with distended GB with cholelithiasis and sludge   - HIDA (12/29) POSITIVE for acute cholecystitis   - Case discussed with IR at length and s/p PERC LYNSEY (1/26)   - Completed zosyn course (12/24-12/31)   - PERC LYNSEY tube to stay in until surgical candidate for cholecystectomy to prevent recurrence     / RENAL   # CKD   - CRE at baseline   - Monitor renal function and UOP     # Hypernatremia   -  Q4H added however sodium downtrending and FWF dc'ed 3/4   - Monitor closely with diuresis as above     INFECTIOUS DISEASE   # COVID with superimposed PNA   # ESBL Kleb PNA   - COVID POSITIVE (12/23) and completed remdesivir x 1 dose and paxlovid course.   - WOB worsened as above requiring intubation and cultures negative. Zosyn completed empirically however hypothermic (2/11) and BCx, UA, RVP and BAL negative.   - Completed additional zosyn (2/12-2/19) empirically   - Fever spike and thick secretions and SCX (2/26) with ESBL klebs.   - s/p Zosyn (2/27 - 2/29) but resistant and completed Erta (2/29 - 3/6)     HEME  # AOCD   - Monitor HH   - DVT PPX with HSQ     ENDOCRINE   # DM2 A1C 9.3   - Continue on Lantus 12 and ISS     SKIN/ TUBES   - Trach (2/13)   - PEG (2/20)   - PERC LYNSEY (1/26 - )     ETHICS   - FULL CODE     DISPO - vent facility and family aware. SW with accepting facility however pending available bed.

## 2024-03-07 NOTE — PROGRESS NOTE ADULT - ASSESSMENT
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     EKG SR RBBB (old per family)     1) Hypoxia   - s/p bronch   - Rt pleural effusion   - held lasix given Hypernatremia and worsening creat , CXR with cephalization, given a does of IV lasix monitor u/o renal function    - f/u neuro recs MRI brain shows no acute disease  - s/p trach and PEG  -corrected QTc on         2) CAD s/p CABG  - p/w chest pain. EKG non ischemic , trop -sera   - chest pains  Trop mildly elevated and trending down likely sec to kidney disease,   - holding brilinta, was on it for SMA stent placed in 12/2023, held 2/2 anticipation for PEG placement, restart when no further invasive procedures planned  -TTE 2/12 with  mod decrease LV systolic function, new.  will medically manage likely stress induced.  repeat TTE with mild-mod LV dysfunction c/w PO lasix    3) Covid +  - s/p remdesivir   - c/w supportive management   - t/t per primary team   -resolved    4) Abd distension   -CTA AP obtained which showed  partially occlusive calcified and non-calcified plaque just distal to take-off of the SMA, with estimated at least 75% luminal narrowing. Limited evaluation of its distal branches. 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   -on Protonix IV q 12      6) Afib  -hx of PAF  -no AC 2/2 GIB  -on coreg 6.25mg BID

## 2024-03-07 NOTE — PROGRESS NOTE ADULT - ASSESSMENT
Renal Attending:  a-w assessment and plan as written above .        [facility]  Office: [phone]

## 2024-03-07 NOTE — PROGRESS NOTE ADULT - SUBJECTIVE AND OBJECTIVE BOX
NEPHROLOGY     Patient seen and examined with family at bedside, trach to vent, in no acute distress.     MEDICATIONS  (STANDING):  albuterol/ipratropium for Nebulization 3 milliLiter(s) Nebulizer every 12 hours  artificial  tears Solution 1 Drop(s) Left EYE four times a day  aspirin  chewable 81 milliGRAM(s) Enteral Tube daily  carvedilol 6.25 milliGRAM(s) Oral every 12 hours  chlorhexidine 0.12% Liquid 15 milliLiter(s) Oral Mucosa every 12 hours  chlorhexidine 2% Cloths 1 Application(s) Topical daily  dextrose 5%. 1000 milliLiter(s) (100 mL/Hr) IV Continuous <Continuous>  dextrose 5%. 1000 milliLiter(s) (50 mL/Hr) IV Continuous <Continuous>  dextrose 50% Injectable 25 Gram(s) IV Push once  dextrose 50% Injectable 12.5 Gram(s) IV Push once  dextrose 50% Injectable 25 Gram(s) IV Push once  doxazosin 2 milliGRAM(s) Oral at bedtime  escitalopram Solution 10 milliGRAM(s) Oral daily  furosemide    Tablet 20 milliGRAM(s) Oral daily  glucagon  Injectable 1 milliGRAM(s) IntraMuscular once  heparin   Injectable 5000 Unit(s) SubCutaneous every 8 hours  hydrALAZINE 50 milliGRAM(s) Oral every 8 hours  insulin glargine Injectable (LANTUS) 12 Unit(s) SubCutaneous <User Schedule>  insulin lispro (ADMELOG) corrective regimen sliding scale   SubCutaneous every 6 hours  levETIRAcetam  Solution 500 milliGRAM(s) Oral two times a day  melatonin 6 milliGRAM(s) Oral at bedtime  pantoprazole   Suspension 40 milliGRAM(s) Oral every 12 hours  petrolatum Ophthalmic Ointment 1 Application(s) Left EYE at bedtime  polyethylene glycol 3350 17 Gram(s) Oral daily  senna Syrup 10 milliLiter(s) Oral at bedtime  sucralfate suspension 1 Gram(s) Enteral Tube two times a day  ticagrelor 60 milliGRAM(s) Oral every 12 hours    VITALS:  T(C): , Max: 36.8 (03-06-24 @ 12:00)  T(F): , Max: 98.2 (03-06-24 @ 12:00)  HR: 88 (03-07-24 @ 11:05)  BP: 134/76 (03-07-24 @ 05:00)  RR: 18 (03-07-24 @ 05:00)  SpO2: 96% (03-07-24 @ 11:05)    I and O's:    03-06 @ 07:01  -  03-07 @ 07:00  --------------------------------------------------------  IN: 1300 mL / OUT: 1465 mL / NET: -165 mL    PHYSICAL EXAM:  Constitutional: trach to vent   HEENT: +trach  Respiratory: coarse bs   Cardiovascular: normal s1s2  Gastrointestinal: BS+, soft, NT/ND +PEG  Extremities:+ peripheral edema  :  no delong   Skin: No rashes    LABS:                        9.0    8.91  )-----------( 467      ( 07 Mar 2024 05:39 )             29.2     03-07    140  |  98  |  35<H>  ----------------------------<  155<H>  4.8   |  30  |  1.14    Ca    9.1      07 Mar 2024 05:39  Phos  2.5     03-07  Mg     2.20     03-07    IMPRESSION:  89M w/ DM2, HTN, CVA, PAD, CKD3, and CAD-CABG, 12/23/23 p/w COVID19 infection, c/b respiratory failure/hypotension; now s/p tracheostomy    (1)Renal - CKD3; superimposed mild prerenal azotemia - numbers fluctuating based on hemodynamic status  (2)CV - now off pressors and midodrine, BP improved/stable    (3)Pulm - vent-dependent   (4)ID - afebrile, s/p zosyn   (5)GI - s/p PEG (2/20)  (6)Hypernatremia - possibly due to diuresis, Na+ improved     RECOMMEND:   (1)a/w lasix 20 mg daily   (2)flush PEG as needed   (3)Continue meds for GFR 40-50ml/min  (4) give aranesp 60 mcg Sq x 1   (5) free water 250 ml bid via PEG

## 2024-03-07 NOTE — PROGRESS NOTE ADULT - SUBJECTIVE AND OBJECTIVE BOX
Problem: Potential for Falls  Goal: Patient will remain free of falls  Description  INTERVENTIONS:  - Assess patient frequently for physical needs  -  Identify cognitive and physical deficits and behaviors that affect risk of falls    -  [place] fall precautions as indicated by assessment   - Educate patient/family on patient safety including physical limitations  - Instruct patient to call for assistance with activity based on assessment  - Modify environment to reduce risk of injury  - Consider OT/PT consult to assist with strengthening/mobility  Outcome: Progressing     Problem: PAIN - ADULT  Goal: Verbalizes/displays adequate comfort level or baseline comfort level  Description  Interventions:  - Encourage patient to monitor pain and request assistance  - Assess pain using appropriate pain scale  - Administer analgesics based on type and severity of pain and evaluate response  - Implement non-pharmacological measures as appropriate and evaluate response  - Consider cultural and social influences on pain and pain management  - Notify physician/advanced practitioner if interventions unsuccessful or patient reports new pain  Outcome: Progressing     Problem: INFECTION - ADULT  Goal: Absence or prevention of progression during hospitalization  Description  INTERVENTIONS:  - Assess and monitor for signs and symptoms of infection  - Monitor lab/diagnostic results  - Monitor all insertion sites, i e  indwelling lines, tubes, and drains  - Monitor endotracheal if appropriate and nasal secretions for changes in amount and color  - [place] appropriate cooling/warming therapies per order  - Administer medications as ordered  - Instruct and encourage patient and family to use good hand hygiene technique     Outcome: Progressing     Problem: SAFETY ADULT  Goal: Maintain or return to baseline ADL function  Description  INTERVENTIONS:  -  Assess patient's ability to carry out ADLs; assess patient's baseline for ADL function and identify physical deficits which impact ability to perform ADLs (bathing, care of mouth/teeth, toileting, grooming, dressing, etc )  - Assess/evaluate cause of self-care deficits   - Assess range of motion  - Assess patient's mobility; develop plan if impaired  - Assess patient's need for assistive devices and provide as appropriate  - Encourage maximum independence but intervene and supervise when necessary  - Involve family in performance of ADLs  - Assess for home care needs following discharge   - Consider OT consult to assist with ADL evaluation and planning for discharge  - Provide patient education as appropriate  Outcome: Progressing  Goal: Maintain or return mobility status to optimal level  Description  INTERVENTIONS:  - Assess patient's baseline mobility status (ambulation, transfers, stairs, etc )    - Identify cognitive and physical deficits and behaviors that affect mobility  - Identify mobility aids required to assist with transfers and/or ambulation (gait belt, sit-to-stand, lift, walker, cane, etc )  - [place] fall precautions as indicated by assessment  - Record patient progress and toleration of activity level on Mobility SBAR; progress patient to next Phase/Stage  - Instruct patient to call for assistance with activity based on assessment  - Consider rehabilitation consult to assist with strengthening/weightbearing, etc   Outcome: Progressing     Problem: DISCHARGE PLANNING  Goal: Discharge to home or other facility with appropriate resources  Description  INTERVENTIONS:  - Identify barriers to discharge w/patient and caregiver  - Arrange for needed discharge resources and transportation as appropriate  - Identify discharge learning needs (meds, wound care, etc )  - Arrange for interpretive services to assist at discharge as needed  - Refer to Case Management Department for coordinating discharge planning if the patient needs post-hospital services based on physician/advanced CHIEF COMPLAINT: Patient is a 89y old  Male who presents with a chief complaint of COVID19, Chest pain (06 Mar 2024 15:01)      INTERVAL EVENTS:  - No interval events overnight     REVIEW OF SYSTEMS: Seen by bedside during AM rounds and unable to assess ROS as vented/ language barrier     Mode: CPAP with PS, FiO2: 30, PEEP: 5, PS: 12, MAP: 10      OBJECTIVE:  ICU Vital Signs Last 24 Hrs  T(C): 36.4 (07 Mar 2024 05:00), Max: 36.8 (06 Mar 2024 12:00)  T(F): 97.6 (07 Mar 2024 05:00), Max: 98.2 (06 Mar 2024 12:00)  HR: 87 (07 Mar 2024 07:07) (82 - 94)  BP: 134/76 (07 Mar 2024 05:00) (117/55 - 154/71)  BP(mean): --  ABP: --  ABP(mean): --  RR: 18 (07 Mar 2024 05:00) (18 - 24)  SpO2: 95% (07 Mar 2024 07:07) (94% - 100%)    O2 Parameters below as of 07 Mar 2024 05:00  Patient On (Oxygen Delivery Method): ventilator    O2 Concentration (%): 30      Mode: CPAP with PS, FiO2: 30, PEEP: 5, PS: 12, MAP: 10    03-06 @ 07:01  -  03-07 @ 07:00  --------------------------------------------------------  IN: 1300 mL / OUT: 1465 mL / NET: -165 mL      CAPILLARY BLOOD GLUCOSE  POCT Blood Glucose.: 174 mg/dL (07 Mar 2024 05:03)      HOSPITAL MEDICATIONS:  MEDICATIONS  (STANDING):  albuterol/ipratropium for Nebulization 3 milliLiter(s) Nebulizer every 12 hours  artificial  tears Solution 1 Drop(s) Left EYE four times a day  aspirin  chewable 81 milliGRAM(s) Enteral Tube daily  carvedilol 6.25 milliGRAM(s) Oral every 12 hours  chlorhexidine 0.12% Liquid 15 milliLiter(s) Oral Mucosa every 12 hours  chlorhexidine 2% Cloths 1 Application(s) Topical daily  dextrose 5%. 1000 milliLiter(s) (100 mL/Hr) IV Continuous <Continuous>  dextrose 5%. 1000 milliLiter(s) (50 mL/Hr) IV Continuous <Continuous>  dextrose 50% Injectable 25 Gram(s) IV Push once  dextrose 50% Injectable 12.5 Gram(s) IV Push once  dextrose 50% Injectable 25 Gram(s) IV Push once  doxazosin 2 milliGRAM(s) Oral at bedtime  escitalopram Solution 10 milliGRAM(s) Oral daily  furosemide    Tablet 20 milliGRAM(s) Oral daily  glucagon  Injectable 1 milliGRAM(s) IntraMuscular once  heparin   Injectable 5000 Unit(s) SubCutaneous every 8 hours  hydrALAZINE 50 milliGRAM(s) Oral every 8 hours  insulin glargine Injectable (LANTUS) 12 Unit(s) SubCutaneous <User Schedule>  insulin lispro (ADMELOG) corrective regimen sliding scale   SubCutaneous every 6 hours  levETIRAcetam  Solution 500 milliGRAM(s) Oral two times a day  melatonin 6 milliGRAM(s) Oral at bedtime  pantoprazole   Suspension 40 milliGRAM(s) Oral every 12 hours  petrolatum Ophthalmic Ointment 1 Application(s) Left EYE at bedtime  polyethylene glycol 3350 17 Gram(s) Oral daily  senna Syrup 10 milliLiter(s) Oral at bedtime  sucralfate suspension 1 Gram(s) Enteral Tube two times a day  ticagrelor 60 milliGRAM(s) Oral every 12 hours    MEDICATIONS  (PRN):  acetaminophen   Oral Liquid .. 650 milliGRAM(s) Oral every 6 hours PRN Temp greater or equal to 38C (100.4F), Mild Pain (1 - 3), Moderate Pain (4 - 6)  dextrose Oral Gel 15 Gram(s) Oral once PRN Blood Glucose LESS THAN 70 milliGRAM(s)/deciliter  LORazepam   Injectable 0.5 milliGRAM(s) IV Push every 6 hours PRN Agitation      PHYSICAL EXAMINATION  General: NAD   HEENT: Trach present   Cards: S1/S2, no murmurs   Pulm: Course vent sounds bilaterally with diminished bases (R>L) increased. No wheezes.   Abdomen: Soft, nondistended and nontender. BS (+) PEG present.   Extremities: Mild pedal edema bilaterally. [place] of [place] upper > lower extremities when spoken to in native language but limited given agitation and ativan needed intermittently.  Neurology: Awaken with eyes open and intermittently follows commands when spoken to in native language but limited by weakness with no acute focal neurological deficits    LABS:                        9.0    8.91  )-----------( 467      ( 07 Mar 2024 05:39 )             29.2     03-07    140  |  98  |  35<H>  ----------------------------<  155<H>  4.8   |  30  |  1.14    Ca    9.1      07 Mar 2024 05:39  Phos  2.5     03-07  Mg     2.20     03-07        Urinalysis Basic - ( 07 Mar 2024 05:39 )  Color: x / Appearance: x / SG: x / pH: x  Gluc: 155 mg/dL / Ketone: x  / Bili: x / Urobili: x   Blood: x / Protein: x / Nitrite: x   Leuk Esterase: x / RBC: x / WBC x   Sq Epi: x / Non Sq Epi: x / Bacteria: x            PAST MEDICAL & SURGICAL HISTORY:  Hyperlipemia      Hypertension      Coronary Artery Disease      Diabetes Mellitus Type II      Stented Coronary Artery  total 5 stents, last stent 5/2019      Neuropathy      Myocardial infarction      Stroke  mild left facial numbness   no other residuals verbalized      Myoclonic jerking      Stage 3 chronic kidney disease      History of Cataract Extraction      Hx of CABG      H/O coronary angiogram      S/P coronary artery stent placement  1/6/09      S/P placement of cardiac pacemaker          FAMILY HISTORY:  No pertinent family history in first degree relatives        Social History:  Lives with family  Ambulates with walker  Denies tobacco, EtOH, or illicit substance use (23 Dec 2023 22:51)      RADIOLOGY:  [ ] Reviewed and interpreted by me    PULMONARY FUNCTION TESTS:    EKG: practitioner order or complex needs related to functional status, cognitive ability, or social support system  Outcome: Progressing     Problem: Knowledge Deficit  Goal: Patient/family/caregiver demonstrates understanding of disease process, treatment plan, medications, and discharge instructions  Description  Complete learning assessment and assess knowledge base    Interventions:  - Provide teaching at level of understanding  - Provide teaching via preferred learning methods  Outcome: Progressing CHIEF COMPLAINT: Patient is a 89y old  Male who presents with a chief complaint of COVID19, Chest pain (06 Mar 2024 15:01)      INTERVAL EVENTS:  - No interval events overnight   - Tolerating PS 10/5    REVIEW OF SYSTEMS: Seen by bedside during AM rounds and unable to assess ROS as vented/ language barrier     Mode: CPAP with PS, FiO2: 30, PEEP: 5, PS: 12, MAP: 10      OBJECTIVE:  ICU Vital Signs Last 24 Hrs  T(C): 36.4 (07 Mar 2024 05:00), Max: 36.8 (06 Mar 2024 12:00)  T(F): 97.6 (07 Mar 2024 05:00), Max: 98.2 (06 Mar 2024 12:00)  HR: 87 (07 Mar 2024 07:07) (82 - 94)  BP: 134/76 (07 Mar 2024 05:00) (117/55 - 154/71)  BP(mean): --  ABP: --  ABP(mean): --  RR: 18 (07 Mar 2024 05:00) (18 - 24)  SpO2: 95% (07 Mar 2024 07:07) (94% - 100%)    O2 Parameters below as of 07 Mar 2024 05:00  Patient On (Oxygen Delivery Method): ventilator    O2 Concentration (%): 30      Mode: CPAP with PS, FiO2: 30, PEEP: 5, PS: 12, MAP: 10    03-06 @ 07:01  -  03-07 @ 07:00  --------------------------------------------------------  IN: 1300 mL / OUT: 1465 mL / NET: -165 mL      CAPILLARY BLOOD GLUCOSE  POCT Blood Glucose.: 174 mg/dL (07 Mar 2024 05:03)      HOSPITAL MEDICATIONS:  MEDICATIONS  (STANDING):  albuterol/ipratropium for Nebulization 3 milliLiter(s) Nebulizer every 12 hours  artificial  tears Solution 1 Drop(s) Left EYE four times a day  aspirin  chewable 81 milliGRAM(s) Enteral Tube daily  carvedilol 6.25 milliGRAM(s) Oral every 12 hours  chlorhexidine 0.12% Liquid 15 milliLiter(s) Oral Mucosa every 12 hours  chlorhexidine 2% Cloths 1 Application(s) Topical daily  dextrose 5%. 1000 milliLiter(s) (100 mL/Hr) IV Continuous <Continuous>  dextrose 5%. 1000 milliLiter(s) (50 mL/Hr) IV Continuous <Continuous>  dextrose 50% Injectable 25 Gram(s) IV Push once  dextrose 50% Injectable 12.5 Gram(s) IV Push once  dextrose 50% Injectable 25 Gram(s) IV Push once  doxazosin 2 milliGRAM(s) Oral at bedtime  escitalopram Solution 10 milliGRAM(s) Oral daily  furosemide    Tablet 20 milliGRAM(s) Oral daily  glucagon  Injectable 1 milliGRAM(s) IntraMuscular once  heparin   Injectable 5000 Unit(s) SubCutaneous every 8 hours  hydrALAZINE 50 milliGRAM(s) Oral every 8 hours  insulin glargine Injectable (LANTUS) 12 Unit(s) SubCutaneous <User Schedule>  insulin lispro (ADMELOG) corrective regimen sliding scale   SubCutaneous every 6 hours  levETIRAcetam  Solution 500 milliGRAM(s) Oral two times a day  melatonin 6 milliGRAM(s) Oral at bedtime  pantoprazole   Suspension 40 milliGRAM(s) Oral every 12 hours  petrolatum Ophthalmic Ointment 1 Application(s) Left EYE at bedtime  polyethylene glycol 3350 17 Gram(s) Oral daily  senna Syrup 10 milliLiter(s) Oral at bedtime  sucralfate suspension 1 Gram(s) Enteral Tube two times a day  ticagrelor 60 milliGRAM(s) Oral every 12 hours    MEDICATIONS  (PRN):  acetaminophen   Oral Liquid .. 650 milliGRAM(s) Oral every 6 hours PRN Temp greater or equal to 38C (100.4F), Mild Pain (1 - 3), Moderate Pain (4 - 6)  dextrose Oral Gel 15 Gram(s) Oral once PRN Blood Glucose LESS THAN 70 milliGRAM(s)/deciliter  LORazepam   Injectable 0.5 milliGRAM(s) IV Push every 6 hours PRN Agitation      PHYSICAL EXAMINATION  General: NAD   HEENT: Trach present   Cards: S1/S2, no murmurs   Pulm: Course vent sounds bilaterally with diminished bases (R>L) increased. No wheezes.   Abdomen: Soft, nondistended and nontender. BS (+) PEG present.   Extremities: Mild pedal edema bilaterally. [place] of [place] upper > lower extremities when spoken to in native language but limited given agitation and ativan needed intermittently.  Neurology: Awaken with eyes open and intermittently follows commands when spoken to in native language but limited by weakness with no acute focal neurological deficits    LABS:                        9.0    8.91  )-----------( 467      ( 07 Mar 2024 05:39 )             29.2     03-07    140  |  98  |  35<H>  ----------------------------<  155<H>  4.8   |  30  |  1.14    Ca    9.1      07 Mar 2024 05:39  Phos  2.5     03-07  Mg     2.20     03-07        Urinalysis Basic - ( 07 Mar 2024 05:39 )  Color: x / Appearance: x / SG: x / pH: x  Gluc: 155 mg/dL / Ketone: x  / Bili: x / Urobili: x   Blood: x / Protein: x / Nitrite: x   Leuk Esterase: x / RBC: x / WBC x   Sq Epi: x / Non Sq Epi: x / Bacteria: x            PAST MEDICAL & SURGICAL HISTORY:  Hyperlipemia      Hypertension      Coronary Artery Disease      Diabetes Mellitus Type II      Stented Coronary Artery  total 5 stents, last stent 5/2019      Neuropathy      Myocardial infarction      Stroke  mild left facial numbness   no other residuals verbalized      Myoclonic jerking      Stage 3 chronic kidney disease      History of Cataract Extraction      Hx of CABG      H/O coronary angiogram      S/P coronary artery stent placement  1/6/09      S/P placement of cardiac pacemaker          FAMILY HISTORY:  No pertinent family history in first degree relatives        Social History:  Lives with family  Ambulates with walker  Denies tobacco, EtOH, or illicit substance use (23 Dec 2023 22:51)      RADIOLOGY:  [ ] Reviewed and interpreted by me    PULMONARY FUNCTION TESTS:    EKG:

## 2024-03-08 LAB
ANION GAP SERPL CALC-SCNC: 12 MMOL/L — SIGNIFICANT CHANGE UP (ref 7–14)
BUN SERPL-MCNC: 36 MG/DL — HIGH (ref 7–23)
CALCIUM SERPL-MCNC: 9.1 MG/DL — SIGNIFICANT CHANGE UP (ref 8.4–10.5)
CHLORIDE SERPL-SCNC: 99 MMOL/L — SIGNIFICANT CHANGE UP (ref 98–107)
CO2 SERPL-SCNC: 30 MMOL/L — SIGNIFICANT CHANGE UP (ref 22–31)
CREAT SERPL-MCNC: 1.17 MG/DL — SIGNIFICANT CHANGE UP (ref 0.5–1.3)
EGFR: 59 ML/MIN/1.73M2 — LOW
GLUCOSE BLDC GLUCOMTR-MCNC: 144 MG/DL — HIGH (ref 70–99)
GLUCOSE BLDC GLUCOMTR-MCNC: 178 MG/DL — HIGH (ref 70–99)
GLUCOSE BLDC GLUCOMTR-MCNC: 186 MG/DL — HIGH (ref 70–99)
GLUCOSE BLDC GLUCOMTR-MCNC: 195 MG/DL — HIGH (ref 70–99)
GLUCOSE BLDC GLUCOMTR-MCNC: 205 MG/DL — HIGH (ref 70–99)
GLUCOSE SERPL-MCNC: 132 MG/DL — HIGH (ref 70–99)
MAGNESIUM SERPL-MCNC: 2.2 MG/DL — SIGNIFICANT CHANGE UP (ref 1.6–2.6)
PHOSPHATE SERPL-MCNC: 2.5 MG/DL — SIGNIFICANT CHANGE UP (ref 2.5–4.5)
POTASSIUM SERPL-MCNC: 4.3 MMOL/L — SIGNIFICANT CHANGE UP (ref 3.5–5.3)
POTASSIUM SERPL-SCNC: 4.3 MMOL/L — SIGNIFICANT CHANGE UP (ref 3.5–5.3)
SODIUM SERPL-SCNC: 141 MMOL/L — SIGNIFICANT CHANGE UP (ref 135–145)

## 2024-03-08 PROCEDURE — 99233 SBSQ HOSP IP/OBS HIGH 50: CPT | Mod: FS

## 2024-03-08 RX ORDER — DARBEPOETIN ALFA IN POLYSORBAT 200MCG/0.4
60 PEN INJECTOR (ML) SUBCUTANEOUS ONCE
Refills: 0 | Status: DISCONTINUED | OUTPATIENT
Start: 2024-03-08 | End: 2024-03-11

## 2024-03-08 RX ADMIN — Medication 81 MILLIGRAM(S): at 11:23

## 2024-03-08 RX ADMIN — LEVETIRACETAM 500 MILLIGRAM(S): 250 TABLET, FILM COATED ORAL at 05:02

## 2024-03-08 RX ADMIN — Medication 50 MILLIGRAM(S): at 21:57

## 2024-03-08 RX ADMIN — Medication 1 GRAM(S): at 17:02

## 2024-03-08 RX ADMIN — INSULIN GLARGINE 12 UNIT(S): 100 INJECTION, SOLUTION SUBCUTANEOUS at 11:21

## 2024-03-08 RX ADMIN — Medication 0.5 MILLIGRAM(S): at 02:13

## 2024-03-08 RX ADMIN — CARVEDILOL PHOSPHATE 6.25 MILLIGRAM(S): 80 CAPSULE, EXTENDED RELEASE ORAL at 05:02

## 2024-03-08 RX ADMIN — HEPARIN SODIUM 5000 UNIT(S): 5000 INJECTION INTRAVENOUS; SUBCUTANEOUS at 16:30

## 2024-03-08 RX ADMIN — PANTOPRAZOLE SODIUM 40 MILLIGRAM(S): 20 TABLET, DELAYED RELEASE ORAL at 17:03

## 2024-03-08 RX ADMIN — Medication 2: at 00:40

## 2024-03-08 RX ADMIN — SENNA PLUS 10 MILLILITER(S): 8.6 TABLET ORAL at 21:57

## 2024-03-08 RX ADMIN — Medication 2 MILLIGRAM(S): at 21:57

## 2024-03-08 RX ADMIN — HEPARIN SODIUM 5000 UNIT(S): 5000 INJECTION INTRAVENOUS; SUBCUTANEOUS at 05:02

## 2024-03-08 RX ADMIN — Medication 1 GRAM(S): at 05:00

## 2024-03-08 RX ADMIN — Medication 4: at 17:49

## 2024-03-08 RX ADMIN — Medication 3 MILLILITER(S): at 10:29

## 2024-03-08 RX ADMIN — Medication 3 MILLILITER(S): at 20:58

## 2024-03-08 RX ADMIN — TICAGRELOR 60 MILLIGRAM(S): 90 TABLET ORAL at 17:02

## 2024-03-08 RX ADMIN — Medication 2: at 11:22

## 2024-03-08 RX ADMIN — HEPARIN SODIUM 5000 UNIT(S): 5000 INJECTION INTRAVENOUS; SUBCUTANEOUS at 21:56

## 2024-03-08 RX ADMIN — CHLORHEXIDINE GLUCONATE 1 APPLICATION(S): 213 SOLUTION TOPICAL at 11:23

## 2024-03-08 RX ADMIN — Medication 1 DROP(S): at 05:01

## 2024-03-08 RX ADMIN — ESCITALOPRAM OXALATE 10 MILLIGRAM(S): 10 TABLET, FILM COATED ORAL at 11:24

## 2024-03-08 RX ADMIN — Medication 20 MILLIGRAM(S): at 05:03

## 2024-03-08 RX ADMIN — CHLORHEXIDINE GLUCONATE 15 MILLILITER(S): 213 SOLUTION TOPICAL at 17:02

## 2024-03-08 RX ADMIN — CHLORHEXIDINE GLUCONATE 15 MILLILITER(S): 213 SOLUTION TOPICAL at 05:02

## 2024-03-08 RX ADMIN — Medication 50 MILLIGRAM(S): at 05:02

## 2024-03-08 RX ADMIN — Medication 1 DROP(S): at 00:40

## 2024-03-08 RX ADMIN — PANTOPRAZOLE SODIUM 40 MILLIGRAM(S): 20 TABLET, DELAYED RELEASE ORAL at 05:00

## 2024-03-08 RX ADMIN — LEVETIRACETAM 500 MILLIGRAM(S): 250 TABLET, FILM COATED ORAL at 17:02

## 2024-03-08 RX ADMIN — Medication 50 MILLIGRAM(S): at 16:28

## 2024-03-08 RX ADMIN — Medication 1 APPLICATION(S): at 21:58

## 2024-03-08 RX ADMIN — Medication 1 DROP(S): at 11:21

## 2024-03-08 RX ADMIN — Medication 6 MILLIGRAM(S): at 21:57

## 2024-03-08 RX ADMIN — Medication 1 DROP(S): at 17:03

## 2024-03-08 RX ADMIN — TICAGRELOR 60 MILLIGRAM(S): 90 TABLET ORAL at 05:01

## 2024-03-08 RX ADMIN — POLYETHYLENE GLYCOL 3350 17 GRAM(S): 17 POWDER, FOR SOLUTION ORAL at 11:24

## 2024-03-08 RX ADMIN — Medication 0.5 MILLIGRAM(S): at 10:08

## 2024-03-08 RX ADMIN — CARVEDILOL PHOSPHATE 6.25 MILLIGRAM(S): 80 CAPSULE, EXTENDED RELEASE ORAL at 16:31

## 2024-03-08 NOTE — PROGRESS NOTE ADULT - NSPROGADDITIONALINFOA_GEN_ALL_CORE
I reviewed the overnight course of events on the unit, re-confirming the patient history. I discussed the care with the patient and their family. The plan of care was discussed with the ACP team and modifications were made to the notation where appropriate. Differential diagnosis and plan of care discussed with patient after the evaluation. Advanced care planning was discussed with patient and family.  Advanced care planning forms were reviewed and discussed.  Risks, benefits and alternatives of cardiac procedures were discussed in detail and all questions were answered. 35 minutes spent on total encounter of which more than fifty percent of the encounter was spent counseling and/or coordinating care by the attending physician.

## 2024-03-08 NOTE — PROGRESS NOTE ADULT - ASSESSMENT
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 Course ultimately complicated by progressive WOB and O2 demand and patient intubated and transferred to MICU (1/22). Course further complicated by acute cholecystitis and s/p Perc Lynsey with IR on (1/26), HFrEF 38, pulmonary edema, superimposed PNA, failed extubation failed and prolonged vent time and s/p trach (2/13). Patient transferred to RCU (2/16) and s/p PEG (2/20). Course complicated by volume overload and diuresis and GDMT continued and HFrEF 45 with improvement    NEUROLOGY   # Encephalopathy   - Hx of CVA with no residual deficits per family, however per family worsening confusion at home over the past 6 months (becoming disoriented to time) but prior to admission has been more confused, talking about seeing people that are not present.  - CTH (1/31) with no evidence of acute infarct  - Continue on ASA and Brilinta  - Holding Neurontin, Memantine and Oxycodone in setting of somnolence    # ICA stenosis   - CTA NECK (1/31) with moderate to severe narrowing left internal carotid at the origin. Severe narrowing right internal carotid by a tandem lesion 1.5 cm above the bifurcation with a narrowed internal carotid beyond the lesion. Extensive calcified plaque both cavernous and supraclinoid carotid arteries with narrowing but no occlusion. Mild narrowing proximal right M1 segment. Moderate narrowing both vertebral V4 segments. No perfusion abnormality at the Tmax 6 second threshold, but moderate hypoperfusion in the right MCA territory at the 4 second threshold.   - Continue on ASA and Brilinta    # Myoclonic jerks   - Hx of myoclonic jerks post CVAs   - EEG (1/18-2/6) negative for seizures  - Continue on Keppra and per neuro wishes to wean, however family wishes to keep current dose as home medication.     # Depression/ Insomnia  - Continue on Lexapro per home medication   - Course complicated by agitation and pulling on tubes   - Continue on Seroquel but QTC prolonged and now held   - Continue on Ativan 0.5mg PO Q6H PRN  - Supportive care     CARDIOLOGY   # Vasoplegic vs septic shock  # Hx of HTN   - Weaned off pressors in ICU and now with mild hypertension   - Continue on coreg and hydral as below   - Strict BP control given moderate AS    # AFIB RVR   # Bifascicular Block with Medtronic PPM   - AFIB RVR episodes and PPM interrogated (2/20) by EP with similar episodes  - Previous admission in 6/2022 with cardiogenic syncope second to bifasicular block, however now with PPM and AV myron blockers ok.   - Continue on lopressor 12.5mg BID however replaced with coreg second to HFrEF   - AC discussed at length however with recent GIB will hold for now     # HFrEF 38 likely stress induced?   # Mild to moderate AS   - ECHO (12/2023) with EF 62 with normal LVSF and mild to moderate AS   - ECHO (2/2024) with EF 38, moderate LVSD with global LV hypokinesis, mildly reduced RVSF (TAPSE 1.3), mild to moderate MR, mild AR, and moderate AS.   - RPT ECHO (3/4) with EF 45 and mild to moderate LVSD   - Continue on lasix 20 QD, coreg 6.25 and hydralazine 50 (increased 3/4)   - Hold isordil and up titrate above medications first     # CAD with CABG   - CATH (5/2022) with dLAD 70, SVG graft to OM1 with 90 in stent stenosis s/p PCI and GEMMA placement, and LIMA graft to mLAD with no disease.   - Continue on ASA and brilinta    # Prolonged QTC   - ECG (3/2) with QTC 616ms (corrected using bazzet 490ms)   - ECG (3/3) with QTC 634ms (corrected using bazzet 490ms)   - Monitor off/ avoid QTC prolonging agents for now    RESPIRATORY   # Acute respiratory failure second to SARS-COV-2 vs pulm edema vs PNA  - Presented with COVID complicated by sepsis second to acute lynsey and worsening respiratory failure second to pulmonary edema requiring intubation.   - Prolonged intubation and s/p tracheostomy (2/13)   - CT CHEST 1/16 with new small bilateral pleural effusions/ atelectasis/ patchy bilateral ground-glass opacities are consistent with pulmonary edema.  - Completed ABX and COVID regimen as below   - Continue on vent and initially tolerating some SBT 10/5 today   - Continue on nebs and hold further HTS given intermittent hemoptysis  - Continue on diuresis as above    # Mild hemoptysis likely from suction trauma   - s/p bronch (2/18) with no active bleed  - Hemoptysis improved with TXA and holding further HTS as above   - Tiny hemoptysis second to suction trauma imporving  - Careful with suctioning     HEENT   # L eye subconjunctival hemorrhage   - Continue on artifical tears and Lacrilube   - Optho eval appreciated     GI  # Nutrition/ Dysphagia   - PEG placed by GI on 2/20 and tube feeds continued.   - Loose stools and Banatrol continued     # UGIB   - EGD (1/2) with esophagitis and bleeding Dieulafoy's lesion s/p clips.   - Continue on PPI and carafate     # SMA calcification   - CTA demonstrating mesenteric fat stranding associated with ascending/transverse colon  - Diagnostic laparoscopy (12/24) with small bowel and visible colon viable, some inflammation of omentum in RUQ  - SMA Angiogram and stent placed (12/25).   - Continue on ASA and Brilinta     # Acute Cholecystitis   - CT (12/26) with distended GB with cholelithiasis and sludge   - HIDA (12/29) POSITIVE for acute cholecystitis   - Case discussed with IR at length and s/p PERC LYNSEY (1/26)   - Completed zosyn course (12/24-12/31)   - PERC LYNSEY tube to stay in until surgical candidate for cholecystectomy to prevent recurrence     / RENAL   # CKD   - CRE at baseline   - Monitor renal function and UOP     # Hypernatremia   -  Q4H added however sodium downtrending and FWF dc'ed 3/4   - Monitor closely with diuresis as above     INFECTIOUS DISEASE   # COVID with superimposed PNA   # ESBL Kleb PNA   - COVID POSITIVE (12/23) and completed remdesivir x 1 dose and paxlovid course.   - WOB worsened as above requiring intubation and cultures negative. Zosyn completed empirically however hypothermic (2/11) and BCx, UA, RVP and BAL negative.   - Completed additional zosyn (2/12-2/19) empirically   - Fever spike and thick secretions and SCX (2/26) with ESBL klebs.   - s/p Zosyn (2/27 - 2/29) but resistant and completed Erta (2/29 - 3/6)     HEME  # AOCD   - Monitor HH   - DVT PPX with HSQ     ENDOCRINE   # DM2 A1C 9.3   - Continue on Lantus 12 and ISS     SKIN/ TUBES   - Trach (2/13)   - PEG (2/20)   - PERC LYNSEY (1/26 - )     ETHICS   - FULL CODE     DISPO - vent facility and family aware. SW with accepting facility however pending available bed.  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 Course ultimately complicated by progressive WOB and O2 demand and patient intubated and transferred to MICU (1/22). Course further complicated by acute cholecystitis and s/p Perc Lynsey with IR on (1/26), HFrEF 38, pulmonary edema, superimposed PNA, failed extubation failed and prolonged vent time and s/p trach (2/13). Patient transferred to RCU (2/16) and s/p PEG (2/20). Course complicated by volume overload and diuresis and GDMT continued and HFrEF 45 with improvement    NEUROLOGY   # Encephalopathy   - Hx of CVA with no residual deficits per family, however per family worsening confusion at home over the past 6 months (becoming disoriented to time) but prior to admission has been more confused, talking about seeing people that are not present.  - CTH (1/31) with no evidence of acute infarct  - Continue on ASA and Brilinta  - Holding Neurontin, Memantine and Oxycodone in setting of somnolence    # ICA stenosis   - CTA NECK (1/31) with moderate to severe narrowing left internal carotid at the origin. Severe narrowing right internal carotid by a tandem lesion 1.5 cm above the bifurcation with a narrowed internal carotid beyond the lesion. Extensive calcified plaque both cavernous and supraclinoid carotid arteries with narrowing but no occlusion. Mild narrowing proximal right M1 segment. Moderate narrowing both vertebral V4 segments. No perfusion abnormality at the Tmax 6 second threshold, but moderate hypoperfusion in the right MCA territory at the 4 second threshold.   - Continue on ASA and Brilinta    # Myoclonic jerks   - Hx of myoclonic jerks post CVAs   - EEG (1/18-2/6) negative for seizures  - Continue on Keppra and per neuro wishes to wean, however family wishes to keep current dose as home medication.     # Depression/ Insomnia  - Continue on Lexapro per home medication   - Course complicated by agitation and pulling on tubes   - Continue on Seroquel but QTC prolonged and now held   - Continue on Ativan 0.5mg PO Q6H PRN  - Supportive care     CARDIOLOGY   # Vasoplegic vs septic shock  # Hx of HTN   - Weaned off pressors in ICU and now with mild hypertension   - Continue on coreg and hydral as below   - Strict BP control given moderate AS    # AFIB RVR   # Bifascicular Block with Medtronic PPM   - AFIB RVR episodes and PPM interrogated (2/20) by EP with similar episodes  - Previous admission in 6/2022 with cardiogenic syncope second to bifasicular block, however now with PPM and AV myron blockers ok.   - Continue on lopressor 12.5mg BID however replaced with coreg second to HFrEF   - AC discussed at length however with recent GIB will hold for now     # HFrEF 38 likely stress induced?   # Mild to moderate AS   - ECHO (12/2023) with EF 62 with normal LVSF and mild to moderate AS   - ECHO (2/2024) with EF 38, moderate LVSD with global LV hypokinesis, mildly reduced RVSF (TAPSE 1.3), mild to moderate MR, mild AR, and moderate AS.   - RPT ECHO (3/4) with EF 45 and mild to moderate LVSD   - Continue on lasix 20 QD, coreg 6.25 and hydralazine 50 (increased 3/4)   - Hold isordil and up titrate above medications first     # CAD with CABG   - CATH (5/2022) with dLAD 70, SVG graft to OM1 with 90 in stent stenosis s/p PCI and GEMMA placement, and LIMA graft to mLAD with no disease.   - Continue on ASA and brilinta    # Prolonged QTC   - ECG (3/2) with QTC 616ms (corrected using bazzet 490ms)   - ECG (3/3) with QTC 634ms (corrected using bazzet 490ms)   - Monitor off/ avoid QTC prolonging agents for now    RESPIRATORY   # Acute respiratory failure second to SARS-COV-2 vs pulm edema vs PNA  - Presented with COVID complicated by sepsis second to acute lynsey and worsening respiratory failure second to pulmonary edema requiring intubation.   - Prolonged intubation and s/p tracheostomy (2/13)   - CT CHEST 1/16 with new small bilateral pleural effusions/ atelectasis/ patchy bilateral ground-glass opacities are consistent with pulmonary edema.  - Completed ABX and COVID regimen as below   - Continue on vent and initially tolerating some SBT 10/5   - Continue on nebs and hold further HTS given intermittent hemoptysis  - Continue on diuresis as above    # Mild hemoptysis likely from suction trauma   - s/p bronch (2/18) with no active bleed  - Hemoptysis improved with TXA and holding further HTS as above   - Tiny hemoptysis second to suction trauma imporving  - Careful with suctioning     HEENT   # L eye subconjunctival hemorrhage   - Continue on artifical tears and Lacrilube   - Optho eval appreciated     GI  # Nutrition/ Dysphagia   - PEG placed by GI on 2/20 and tube feeds continued.   - Loose stools and Banatrol continued     # UGIB   - EGD (1/2) with esophagitis and bleeding Dieulafoy's lesion s/p clips.   - Continue on PPI and carafate     # SMA calcification   - CTA demonstrating mesenteric fat stranding associated with ascending/transverse colon  - Diagnostic laparoscopy (12/24) with small bowel and visible colon viable, some inflammation of omentum in RUQ  - SMA Angiogram and stent placed (12/25).   - Continue on ASA and Brilinta     # Acute Cholecystitis   - CT (12/26) with distended GB with cholelithiasis and sludge   - HIDA (12/29) POSITIVE for acute cholecystitis   - Case discussed with IR at length and s/p PERC LYNSEY (1/26)   - Completed zosyn course (12/24-12/31)   - PERC LYNSEY tube to stay in until surgical candidate for cholecystectomy to prevent recurrence     / RENAL   # CKD   - CRE at baseline   - Monitor renal function and UOP   - Aranesp SQ x1 (3/8)    # Hypernatremia   -  Q4H added however sodium downtrending and FWF dc'ed 3/4   - Monitor closely with diuresis as above   -  BID restarted per Renal (3/8)    INFECTIOUS DISEASE   # COVID with superimposed PNA   # ESBL Kleb PNA   - COVID POSITIVE (12/23) and completed remdesivir x 1 dose and paxlovid course.   - WOB worsened as above requiring intubation and cultures negative. Zosyn completed empirically however hypothermic (2/11) and BCx, UA, RVP and BAL negative.   - Completed additional zosyn (2/12-2/19) empirically   - Fever spike and thick secretions and SCX (2/26) with ESBL klebs.   - s/p Zosyn (2/27 - 2/29) but resistant and completed Erta (2/29 - 3/6)     HEME  # AOCD   - Monitor HH   - DVT PPX with HSQ     ENDOCRINE   # DM2 A1C 9.3   - Continue on Lantus 12 and ISS     SKIN/ TUBES   - Trach (2/13)   - PEG (2/20)   - PERC LYNSEY (1/26 - )     ETHICS   - FULL CODE     DISPO - vent facility and family aware. SW with accepting facility however pending available bed.

## 2024-03-08 NOTE — PROGRESS NOTE ADULT - NS ATTEND AMEND GEN_ALL_CORE FT
agree with above  medically stable  weaning on PS 15  medically stable for d/c, currently no bed available at [facility] at [facility]

## 2024-03-08 NOTE — PROGRESS NOTE ADULT - SUBJECTIVE AND OBJECTIVE BOX
NEPHROLOGY     Patient seen and examined with family at bedside, trach to vent,     MEDICATIONS  (STANDING):  albuterol/ipratropium for Nebulization 3 milliLiter(s) Nebulizer every 12 hours  artificial  tears Solution 1 Drop(s) Left EYE four times a day  aspirin  chewable 81 milliGRAM(s) Enteral Tube daily  carvedilol 6.25 milliGRAM(s) Oral every 12 hours  chlorhexidine 0.12% Liquid 15 milliLiter(s) Oral Mucosa every 12 hours  chlorhexidine 2% Cloths 1 Application(s) Topical daily  dextrose 5%. 1000 milliLiter(s) (100 mL/Hr) IV Continuous <Continuous>  dextrose 5%. 1000 milliLiter(s) (50 mL/Hr) IV Continuous <Continuous>  dextrose 50% Injectable 25 Gram(s) IV Push once  dextrose 50% Injectable 12.5 Gram(s) IV Push once  dextrose 50% Injectable 25 Gram(s) IV Push once  doxazosin 2 milliGRAM(s) Oral at bedtime  escitalopram Solution 10 milliGRAM(s) Oral daily  furosemide    Tablet 20 milliGRAM(s) Oral daily  glucagon  Injectable 1 milliGRAM(s) IntraMuscular once  heparin   Injectable 5000 Unit(s) SubCutaneous every 8 hours  hydrALAZINE 50 milliGRAM(s) Oral every 8 hours  insulin glargine Injectable (LANTUS) 12 Unit(s) SubCutaneous <User Schedule>  insulin lispro (ADMELOG) corrective regimen sliding scale   SubCutaneous every 6 hours  levETIRAcetam  Solution 500 milliGRAM(s) Oral two times a day  melatonin 6 milliGRAM(s) Oral at bedtime  pantoprazole   Suspension 40 milliGRAM(s) Oral every 12 hours  petrolatum Ophthalmic Ointment 1 Application(s) Left EYE at bedtime  polyethylene glycol 3350 17 Gram(s) Oral daily  senna Syrup 10 milliLiter(s) Oral at bedtime  sucralfate suspension 1 Gram(s) Enteral Tube two times a day  ticagrelor 60 milliGRAM(s) Oral every 12 hours    VITALS:  T(C): , Max: 36.7 (03-08-24 @ 08:00)  T(F): , Max: 98.1 (03-08-24 @ 08:00)  HR: 91 (03-08-24 @ 08:00)  BP: 136/67 (03-08-24 @ 08:00)  BP(mean): 87 (03-08-24 @ 08:00)  RR: 29 (03-08-24 @ 08:00)  SpO2: 96% (03-08-24 @ 08:00)    I and O's:    03-07 @ 07:01  -  03-08 @ 07:00  --------------------------------------------------------  IN: 600 mL / OUT: 0 mL / NET: 600 mL    PHYSICAL EXAM:  Constitutional: trach to vent   HEENT: +trach  Respiratory: coarse bs   Cardiovascular: normal s1s2  Gastrointestinal: BS+, soft, NT/ND +PEG  Extremities:+ peripheral edema  : +condom cath  Skin: No rashes    LABS:                        9.0    8.91  )-----------( 467      ( 07 Mar 2024 05:39 )             29.2     03-08    141  |  99  |  36<H>  ----------------------------<  132<H>  4.3   |  30  |  1.17    Ca    9.1      08 Mar 2024 05:20  Phos  2.5     03-08  Mg     2.20     03-08    RADIOLOGY & ADDITIONAL STUDIES:  rad< from: Xray Chest 1 View- PORTABLE-Urgent (Xray Chest 1 View- PORTABLE-Urgent .) (03.07.24 @ 19:40) >    COMPARISON: March 2, 2024        IMPRESSION: Small right and trace left effusion unchanged.    --- End of Report ---      AMELIA ANGLIN MD; Attending Radiologist  This document has been electronically signed. Mar  8 2024  6:21AM    < end of copied text >    IMPRESSION:  89M w/ DM2, HTN, CVA, PAD, CKD3, and CAD-CABG, 12/23/23 p/w COVID19 infection, c/b respiratory failure/hypotension; now s/p tracheostomy    (1)Renal - CKD3; superimposed mild prerenal azotemia - numbers fluctuating based on hemodynamic status  (2)CV - now off pressors and midodrine, BP improved/stable    (3)Pulm - vent-dependent   (4)ID - afebrile, s/p zosyn   (5)GI - s/p PEG (2/20)  (6)Hypernatremia - possibly due to diuresis, Na+ improved     RECOMMEND:   (1)a/w lasix 20 mg daily   (2)flush PEG as needed   (3)Continue meds for GFR 40-50ml/min  (4) give aranesp 60 mcg Sq x 1   (5) free water 250 ml bid via PEG

## 2024-03-08 NOTE — PROGRESS NOTE ADULT - ASSESSMENT
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     EKG SR RBBB (old per family)     1) Hypoxia   - s/p bronch   - Rt pleural effusion   - held lasix given Hypernatremia and worsening creat , CXR with cephalization, given a does of IV lasix monitor u/o renal function    - f/u neuro recs MRI brain shows no acute disease  - s/p trach and PEG  -corrected QTc on    - on lasix 20mg PO daily        2) CAD s/p CABG  - p/w chest pain. EKG non ischemic , trop -sera   - chest pains  Trop mildly elevated and trending down likely sec to kidney disease,   - holding brilinta, was on it for SMA stent placed in 12/2023, held 2/2 anticipation for PEG placement, restart when no further invasive procedures planned  -TTE 2/12 with  mod decrease LV systolic function, new.  will medically manage likely stress induced.  repeat TTE with mild-mod LV dysfunction c/w PO lasix    3) Covid +  - s/p remdesivir   - c/w supportive management   - t/t per primary team   -resolved    4) Abd distension   -CTA AP obtained which showed  partially occlusive calcified and non-calcified plaque just distal to take-off of the SMA, with estimated at least 75% luminal narrowing. Limited evaluation of its distal branches. 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   -on Protonix IV q 12      6) Afib  -hx of PAF  -no AC 2/2 GIB  -on coreg 6.25mg BID

## 2024-03-08 NOTE — PROGRESS NOTE ADULT - SUBJECTIVE AND OBJECTIVE BOX
Aashish Boyer MD  Interventional Cardiology / Advance Heart Failure and Cardiac Transplant Specialist  [place] Office : [address] [place] [address] [place] [address]  Tel:   [place] Office : [address] [facility] [place] [address]  Tel: [phone]       Pt is lying in bed comfortable not in distress  	  MEDICATIONS:  aspirin  chewable 81 milliGRAM(s) Enteral Tube daily  carvedilol 6.25 milliGRAM(s) Oral every 12 hours  doxazosin 2 milliGRAM(s) Oral at bedtime  furosemide    Tablet 20 milliGRAM(s) Oral daily  heparin   Injectable 5000 Unit(s) SubCutaneous every 8 hours  hydrALAZINE 50 milliGRAM(s) Oral every 8 hours  ticagrelor 60 milliGRAM(s) Oral every 12 hours      albuterol/ipratropium for Nebulization 3 milliLiter(s) Nebulizer every 12 hours    acetaminophen   Oral Liquid .. 650 milliGRAM(s) Oral every 6 hours PRN  escitalopram Solution 10 milliGRAM(s) Oral daily  levETIRAcetam  Solution 500 milliGRAM(s) Oral two times a day  LORazepam     Tablet 0.5 milliGRAM(s) Oral every 6 hours PRN  melatonin 6 milliGRAM(s) Oral at bedtime    pantoprazole   Suspension 40 milliGRAM(s) Oral every 12 hours  polyethylene glycol 3350 17 Gram(s) Oral daily  senna Syrup 10 milliLiter(s) Oral at bedtime  sucralfate suspension 1 Gram(s) Enteral Tube two times a day    dextrose 50% Injectable 25 Gram(s) IV Push once  dextrose 50% Injectable 12.5 Gram(s) IV Push once  dextrose 50% Injectable 25 Gram(s) IV Push once  dextrose Oral Gel 15 Gram(s) Oral once PRN  glucagon  Injectable 1 milliGRAM(s) IntraMuscular once  insulin glargine Injectable (LANTUS) 12 Unit(s) SubCutaneous <User Schedule>  insulin lispro (ADMELOG) corrective regimen sliding scale   SubCutaneous every 6 hours    artificial  tears Solution 1 Drop(s) Left EYE four times a day  chlorhexidine 0.12% Liquid 15 milliLiter(s) Oral Mucosa every 12 hours  chlorhexidine 2% Cloths 1 Application(s) Topical daily  darbepoetin Injectable ViaL 60 MICROGram(s) SubCutaneous once  dextrose 5%. 1000 milliLiter(s) IV Continuous <Continuous>  dextrose 5%. 1000 milliLiter(s) IV Continuous <Continuous>  petrolatum Ophthalmic Ointment 1 Application(s) Left EYE at bedtime      PAST MEDICAL/SURGICAL HISTORY  PAST MEDICAL & SURGICAL HISTORY:  Hyperlipemia      Hypertension      Coronary Artery Disease      Diabetes Mellitus Type II      Stented Coronary Artery  total 5 stents, last stent 5/2019      Neuropathy      Myocardial infarction      Stroke  mild left facial numbness   no other residuals verbalized      Myoclonic jerking      Stage 3 chronic kidney disease      History of Cataract Extraction      Hx of CABG      H/O coronary angiogram      S/P coronary artery stent placement  1/6/09      S/P placement of cardiac pacemaker          SOCIAL HISTORY: Substance Use (street drugs): ( x ) never used  (  ) other:    FAMILY HISTORY:  No pertinent family history in first degree relatives         PHYSICAL EXAM:  T(C): 36.2 (03-08-24 @ 12:36), Max: 36.7 (03-08-24 @ 08:00)  HR: 79 (03-08-24 @ 18:35) (79 - 94)  BP: 150/66 (03-08-24 @ 16:29) (116/58 - 150/66)  RR: 15 (03-08-24 @ 12:36) (15 - 29)  SpO2: 100% (03-08-24 @ 18:35) (96% - 100%)  Wt(kg): --  I&O's Summary    07 Mar 2024 07:01  -  08 Mar 2024 07:00  --------------------------------------------------------  IN: 600 mL / OUT: 0 mL / NET: 600 mL          GENERAL: s/p trach  EYES:   PERRLA   ENMT:   Moist mucous membranes, Good dentition, No lesions  Cardiovascular: Normal S1 S2, No JVD, No murmurs, No edema  Respiratory: Lungs clear to auscultation	  Gastrointestinal:  s/p PEG   Extremities: no edema                                    9.0    8.91  )-----------( 467      ( 07 Mar 2024 05:39 )             29.2     03-08    141  |  99  |  36<H>  ----------------------------<  132<H>  4.3   |  30  |  1.17    Ca    9.1      08 Mar 2024 05:20  Phos  2.5     03-08  Mg     2.20     03-08      proBNP:   Lipid Profile:   HgA1c:   TSH:     Consultant(s) Notes Reviewed:  [x ] YES  [ ] NO    Care Discussed with Consultants/Other Providers [ x] YES  [ ] NO    Imaging Personally Reviewed independently:  [x] YES  [ ] NO    All labs, radiologic studies, vitals, orders and medications list reviewed. Patient is seen and examined at bedside. Case discussed with medical team.

## 2024-03-08 NOTE — PROGRESS NOTE ADULT - SUBJECTIVE AND OBJECTIVE BOX
CHIEF COMPLAINT:Patient is a 89y old  Male who presents with a chief complaint of COVID19, Chest pain (07 Mar 2024 14:24)      INTERVAL EVENTS:     ROS: Seen by bedside during AM rounds     OBJECTIVE:  ICU Vital Signs Last 24 Hrs  T(C): 36.2 (08 Mar 2024 04:00), Max: 36.3 (08 Mar 2024 00:00)  T(F): 97.1 (08 Mar 2024 04:00), Max: 97.3 (08 Mar 2024 00:00)  HR: 90 (08 Mar 2024 07:54) (79 - 94)  BP: 120/92 (08 Mar 2024 04:00) (120/92 - 156/73)  BP(mean): 81 (07 Mar 2024 17:14) (81 - 97)  ABP: --  ABP(mean): --  RR: 18 (08 Mar 2024 04:00) (15 - 27)  SpO2: 99% (08 Mar 2024 07:54) (94% - 100%)    O2 Parameters below as of 08 Mar 2024 04:00  Patient On (Oxygen Delivery Method): ventilator    O2 Concentration (%): 30      Mode: CPAP with PS, FiO2: 40, PEEP: 5, PS: 10, MAP: 10    03-07 @ 07:01  -  03-08 @ 07:00  --------------------------------------------------------  IN: 600 mL / OUT: 0 mL / NET: 600 mL      CAPILLARY BLOOD GLUCOSE      POCT Blood Glucose.: 144 mg/dL (08 Mar 2024 05:12)      PHYSICAL EXAM:  General:   HEENT:   Lymph Nodes:  Neck:   Respiratory:   Cardiovascular:   Abdomen:   Extremities:   Skin:   Neurological:  Psychiatry:    Mode: CPAP with PS  FiO2: 40  PEEP: 5  PS: 10  MAP: 10      HOSPITAL MEDICATIONS:  MEDICATIONS  (STANDING):  albuterol/ipratropium for Nebulization 3 milliLiter(s) Nebulizer every 12 hours  artificial  tears Solution 1 Drop(s) Left EYE four times a day  aspirin  chewable 81 milliGRAM(s) Enteral Tube daily  carvedilol 6.25 milliGRAM(s) Oral every 12 hours  chlorhexidine 0.12% Liquid 15 milliLiter(s) Oral Mucosa every 12 hours  chlorhexidine 2% Cloths 1 Application(s) Topical daily  dextrose 5%. 1000 milliLiter(s) (100 mL/Hr) IV Continuous <Continuous>  dextrose 5%. 1000 milliLiter(s) (50 mL/Hr) IV Continuous <Continuous>  dextrose 50% Injectable 25 Gram(s) IV Push once  dextrose 50% Injectable 12.5 Gram(s) IV Push once  dextrose 50% Injectable 25 Gram(s) IV Push once  doxazosin 2 milliGRAM(s) Oral at bedtime  escitalopram Solution 10 milliGRAM(s) Oral daily  furosemide    Tablet 20 milliGRAM(s) Oral daily  glucagon  Injectable 1 milliGRAM(s) IntraMuscular once  heparin   Injectable 5000 Unit(s) SubCutaneous every 8 hours  hydrALAZINE 50 milliGRAM(s) Oral every 8 hours  insulin glargine Injectable (LANTUS) 12 Unit(s) SubCutaneous <User Schedule>  insulin lispro (ADMELOG) corrective regimen sliding scale   SubCutaneous every 6 hours  levETIRAcetam  Solution 500 milliGRAM(s) Oral two times a day  melatonin 6 milliGRAM(s) Oral at bedtime  pantoprazole   Suspension 40 milliGRAM(s) Oral every 12 hours  petrolatum Ophthalmic Ointment 1 Application(s) Left EYE at bedtime  polyethylene glycol 3350 17 Gram(s) Oral daily  senna Syrup 10 milliLiter(s) Oral at bedtime  sucralfate suspension 1 Gram(s) Enteral Tube two times a day  ticagrelor 60 milliGRAM(s) Oral every 12 hours    MEDICATIONS  (PRN):  acetaminophen   Oral Liquid .. 650 milliGRAM(s) Oral every 6 hours PRN Temp greater or equal to 38C (100.4F), Mild Pain (1 - 3), Moderate Pain (4 - 6)  dextrose Oral Gel 15 Gram(s) Oral once PRN Blood Glucose LESS THAN 70 milliGRAM(s)/deciliter  LORazepam     Tablet 0.5 milliGRAM(s) Oral every 6 hours PRN Agitation      LABS:                        9.0    8.91  )-----------( 467      ( 07 Mar 2024 05:39 )             29.2     03-08    141  |  99  |  36<H>  ----------------------------<  132<H>  4.3   |  30  |  1.17    Ca    9.1      08 Mar 2024 05:20  Phos  2.5     03-08  Mg     2.20     03-08        Urinalysis Basic - ( 08 Mar 2024 05:20 )    Color: x / Appearance: x / SG: x / pH: x  Gluc: 132 mg/dL / Ketone: x  / Bili: x / Urobili: x   Blood: x / Protein: x / Nitrite: x   Leuk Esterase: x / RBC: x / WBC x   Sq Epi: x / Non Sq Epi: x / Bacteria: x      Arterial Blood Gas:  03-07 @ 18:30  7.43/49/74/32/96.5/7.2  ABG lactate: --     CHIEF COMPLAINT:Patient is a 89y old  Male who presents with a chief complaint of COVID19, Chest pain (07 Mar 2024 14:24)      INTERVAL EVENTS:     ROS: Seen by bedside during AM rounds     OBJECTIVE:  ICU Vital Signs Last 24 Hrs  T(C): 36.2 (08 Mar 2024 04:00), Max: 36.3 (08 Mar 2024 00:00)  T(F): 97.1 (08 Mar 2024 04:00), Max: 97.3 (08 Mar 2024 00:00)  HR: 90 (08 Mar 2024 07:54) (79 - 94)  BP: 120/92 (08 Mar 2024 04:00) (120/92 - 156/73)  BP(mean): 81 (07 Mar 2024 17:14) (81 - 97)  ABP: --  ABP(mean): --  RR: 18 (08 Mar 2024 04:00) (15 - 27)  SpO2: 99% (08 Mar 2024 07:54) (94% - 100%)    O2 Parameters below as of 08 Mar 2024 04:00  Patient On (Oxygen Delivery Method): ventilator    O2 Concentration (%): 30      Mode: CPAP with PS, FiO2: 40, PEEP: 5, PS: 10, MAP: 10    03-07 @ 07:01  -  03-08 @ 07:00  --------------------------------------------------------  IN: 600 mL / OUT: 0 mL / NET: 600 mL      CAPILLARY BLOOD GLUCOSE      POCT Blood Glucose.: 144 mg/dL (08 Mar 2024 05:12)      PHYSICAL EXAM:  General: NAD   Neck: (+) Trach tube noted, site c/d/i.  Cards: S1/S2, no murmurs   Pulm: CTA bilaterally. No wheezes.   Abdomen: Soft, NTND. (+) PEG noted, site c/d/i. (+)Biliary drain in anterior abdomen noted, draining yellow fluid.  Extremities: 1-2+ b/l LE edema.. Intact distal pulses...  Neurology: AOx3 with no focal neurological deficits  Skin: warm to touch, color appropriate for ethnicity. Refer to RN assessment for further details.      Mode: CPAP with PS  FiO2: 40  PEEP: 5  PS: 10  MAP: 10      HOSPITAL MEDICATIONS:  MEDICATIONS  (STANDING):  albuterol/ipratropium for Nebulization 3 milliLiter(s) Nebulizer every 12 hours  artificial  tears Solution 1 Drop(s) Left EYE four times a day  aspirin  chewable 81 milliGRAM(s) Enteral Tube daily  carvedilol 6.25 milliGRAM(s) Oral every 12 hours  chlorhexidine 0.12% Liquid 15 milliLiter(s) Oral Mucosa every 12 hours  chlorhexidine 2% Cloths 1 Application(s) Topical daily  dextrose 5%. 1000 milliLiter(s) (100 mL/Hr) IV Continuous <Continuous>  dextrose 5%. 1000 milliLiter(s) (50 mL/Hr) IV Continuous <Continuous>  dextrose 50% Injectable 25 Gram(s) IV Push once  dextrose 50% Injectable 12.5 Gram(s) IV Push once  dextrose 50% Injectable 25 Gram(s) IV Push once  doxazosin 2 milliGRAM(s) Oral at bedtime  escitalopram Solution 10 milliGRAM(s) Oral daily  furosemide    Tablet 20 milliGRAM(s) Oral daily  glucagon  Injectable 1 milliGRAM(s) IntraMuscular once  heparin   Injectable 5000 Unit(s) SubCutaneous every 8 hours  hydrALAZINE 50 milliGRAM(s) Oral every 8 hours  insulin glargine Injectable (LANTUS) 12 Unit(s) SubCutaneous <User Schedule>  insulin lispro (ADMELOG) corrective regimen sliding scale   SubCutaneous every 6 hours  levETIRAcetam  Solution 500 milliGRAM(s) Oral two times a day  melatonin 6 milliGRAM(s) Oral at bedtime  pantoprazole   Suspension 40 milliGRAM(s) Oral every 12 hours  petrolatum Ophthalmic Ointment 1 Application(s) Left EYE at bedtime  polyethylene glycol 3350 17 Gram(s) Oral daily  senna Syrup 10 milliLiter(s) Oral at bedtime  sucralfate suspension 1 Gram(s) Enteral Tube two times a day  ticagrelor 60 milliGRAM(s) Oral every 12 hours    MEDICATIONS  (PRN):  acetaminophen   Oral Liquid .. 650 milliGRAM(s) Oral every 6 hours PRN Temp greater or equal to 38C (100.4F), Mild Pain (1 - 3), Moderate Pain (4 - 6)  dextrose Oral Gel 15 Gram(s) Oral once PRN Blood Glucose LESS THAN 70 milliGRAM(s)/deciliter  LORazepam     Tablet 0.5 milliGRAM(s) Oral every 6 hours PRN Agitation      LABS:                        9.0    8.91  )-----------( 467      ( 07 Mar 2024 05:39 )             29.2     03-08    141  |  99  |  36<H>  ----------------------------<  132<H>  4.3   |  30  |  1.17    Ca    9.1      08 Mar 2024 05:20  Phos  2.5     03-08  Mg     2.20     03-08        Urinalysis Basic - ( 08 Mar 2024 05:20 )    Color: x / Appearance: x / SG: x / pH: x  Gluc: 132 mg/dL / Ketone: x  / Bili: x / Urobili: x   Blood: x / Protein: x / Nitrite: x   Leuk Esterase: x / RBC: x / WBC x   Sq Epi: x / Non Sq Epi: x / Bacteria: x      Arterial Blood Gas:  03-07 @ 18:30  7.43/49/74/32/96.5/7.2  ABG lactate: --     CHIEF COMPLAINT:Patient is a 89y old  Male who presents with a chief complaint of COVID19, Chest pain (07 Mar 2024 14:24)      INTERVAL EVENTS:     ROS: Seen by bedside during AM rounds     OBJECTIVE:  ICU Vital Signs Last 24 Hrs  T(C): 36.2 (08 Mar 2024 04:00), Max: 36.3 (08 Mar 2024 00:00)  T(F): 97.1 (08 Mar 2024 04:00), Max: 97.3 (08 Mar 2024 00:00)  HR: 90 (08 Mar 2024 07:54) (79 - 94)  BP: 120/92 (08 Mar 2024 04:00) (120/92 - 156/73)  BP(mean): 81 (07 Mar 2024 17:14) (81 - 97)  ABP: --  ABP(mean): --  RR: 18 (08 Mar 2024 04:00) (15 - 27)  SpO2: 99% (08 Mar 2024 07:54) (94% - 100%)    O2 Parameters below as of 08 Mar 2024 04:00  Patient On (Oxygen Delivery Method): ventilator    O2 Concentration (%): 30      Mode: CPAP with PS, FiO2: 40, PEEP: 5, PS: 10, MAP: 10    03-07 @ 07:01  -  03-08 @ 07:00  --------------------------------------------------------  IN: 600 mL / OUT: 0 mL / NET: 600 mL      CAPILLARY BLOOD GLUCOSE      POCT Blood Glucose.: 144 mg/dL (08 Mar 2024 05:12)      PHYSICAL EXAM:  General: NAD   Neck: (+) Trach tube noted, site c/d/i.  Cards: S1/S2, no murmurs   Pulm: CTA bilaterally. No wheezes.   Abdomen: Soft, NTND. (+) PEG noted, site c/d/i. (+)Biliary drain in anterior abdomen noted, draining yellow fluid.  Extremities: 1-2+ b/l LE edema.. Intact distal pulses...  Neurology: awake/ alert. Eyes open. Tracks at times. Follows some commands intermittently when spoken to in native language by son at bedside (squeeze examiner hands b/l)   Skin: warm to touch, color appropriate for ethnicity. Refer to RN assessment for further details.      Mode: CPAP with PS  FiO2: 40  PEEP: 5  PS: 10  MAP: 10      HOSPITAL MEDICATIONS:  MEDICATIONS  (STANDING):  albuterol/ipratropium for Nebulization 3 milliLiter(s) Nebulizer every 12 hours  artificial  tears Solution 1 Drop(s) Left EYE four times a day  aspirin  chewable 81 milliGRAM(s) Enteral Tube daily  carvedilol 6.25 milliGRAM(s) Oral every 12 hours  chlorhexidine 0.12% Liquid 15 milliLiter(s) Oral Mucosa every 12 hours  chlorhexidine 2% Cloths 1 Application(s) Topical daily  dextrose 5%. 1000 milliLiter(s) (100 mL/Hr) IV Continuous <Continuous>  dextrose 5%. 1000 milliLiter(s) (50 mL/Hr) IV Continuous <Continuous>  dextrose 50% Injectable 25 Gram(s) IV Push once  dextrose 50% Injectable 12.5 Gram(s) IV Push once  dextrose 50% Injectable 25 Gram(s) IV Push once  doxazosin 2 milliGRAM(s) Oral at bedtime  escitalopram Solution 10 milliGRAM(s) Oral daily  furosemide    Tablet 20 milliGRAM(s) Oral daily  glucagon  Injectable 1 milliGRAM(s) IntraMuscular once  heparin   Injectable 5000 Unit(s) SubCutaneous every 8 hours  hydrALAZINE 50 milliGRAM(s) Oral every 8 hours  insulin glargine Injectable (LANTUS) 12 Unit(s) SubCutaneous <User Schedule>  insulin lispro (ADMELOG) corrective regimen sliding scale   SubCutaneous every 6 hours  levETIRAcetam  Solution 500 milliGRAM(s) Oral two times a day  melatonin 6 milliGRAM(s) Oral at bedtime  pantoprazole   Suspension 40 milliGRAM(s) Oral every 12 hours  petrolatum Ophthalmic Ointment 1 Application(s) Left EYE at bedtime  polyethylene glycol 3350 17 Gram(s) Oral daily  senna Syrup 10 milliLiter(s) Oral at bedtime  sucralfate suspension 1 Gram(s) Enteral Tube two times a day  ticagrelor 60 milliGRAM(s) Oral every 12 hours    MEDICATIONS  (PRN):  acetaminophen   Oral Liquid .. 650 milliGRAM(s) Oral every 6 hours PRN Temp greater or equal to 38C (100.4F), Mild Pain (1 - 3), Moderate Pain (4 - 6)  dextrose Oral Gel 15 Gram(s) Oral once PRN Blood Glucose LESS THAN 70 milliGRAM(s)/deciliter  LORazepam     Tablet 0.5 milliGRAM(s) Oral every 6 hours PRN Agitation      LABS:                        9.0    8.91  )-----------( 467      ( 07 Mar 2024 05:39 )             29.2     03-08    141  |  99  |  36<H>  ----------------------------<  132<H>  4.3   |  30  |  1.17    Ca    9.1      08 Mar 2024 05:20  Phos  2.5     03-08  Mg     2.20     03-08        Urinalysis Basic - ( 08 Mar 2024 05:20 )    Color: x / Appearance: x / SG: x / pH: x  Gluc: 132 mg/dL / Ketone: x  / Bili: x / Urobili: x   Blood: x / Protein: x / Nitrite: x   Leuk Esterase: x / RBC: x / WBC x   Sq Epi: x / Non Sq Epi: x / Bacteria: x      Arterial Blood Gas:  03-07 @ 18:30  7.43/49/74/32/96.5/7.2  ABG lactate: --     CHIEF COMPLAINT:Patient is a 89y old  Male who presents with a chief complaint of COVID19, Chest pain (07 Mar 2024 14:24)      INTERVAL EVENTS: no acute overnight events noted.     ROS: Seen by bedside during AM rounds     OBJECTIVE:  ICU Vital Signs Last 24 Hrs  T(C): 36.2 (08 Mar 2024 04:00), Max: 36.3 (08 Mar 2024 00:00)  T(F): 97.1 (08 Mar 2024 04:00), Max: 97.3 (08 Mar 2024 00:00)  HR: 90 (08 Mar 2024 07:54) (79 - 94)  BP: 120/92 (08 Mar 2024 04:00) (120/92 - 156/73)  BP(mean): 81 (07 Mar 2024 17:14) (81 - 97)  ABP: --  ABP(mean): --  RR: 18 (08 Mar 2024 04:00) (15 - 27)  SpO2: 99% (08 Mar 2024 07:54) (94% - 100%)    O2 Parameters below as of 08 Mar 2024 04:00  Patient On (Oxygen Delivery Method): ventilator    O2 Concentration (%): 30      Mode: CPAP with PS, FiO2: 40, PEEP: 5, PS: 10, MAP: 10    03-07 @ 07:01  -  03-08 @ 07:00  --------------------------------------------------------  IN: 600 mL / OUT: 0 mL / NET: 600 mL      CAPILLARY BLOOD GLUCOSE      POCT Blood Glucose.: 144 mg/dL (08 Mar 2024 05:12)      PHYSICAL EXAM:  General: NAD   Neck: (+) Trach tube noted, site c/d/i.  Cards: S1/S2, no murmurs   Pulm: CTA bilaterally. No wheezes.   Abdomen: Soft, NTND. (+) PEG noted, site c/d/i. (+)Biliary drain in anterior abdomen noted, draining yellow fluid.  Extremities: 1-2+ b/l LE edema. Intact distal pulses.  Neurology: awake/ alert. Eyes open. Tracks at times. Follows some commands intermittently when spoken to in native language by son at bedside (squeeze examiner hands b/l)   Skin: warm to touch, color appropriate for ethnicity. Refer to RN assessment for further details.      Mode: CPAP with PS  FiO2: 40  PEEP: 5  PS: 10  MAP: 10      HOSPITAL MEDICATIONS:  MEDICATIONS  (STANDING):  albuterol/ipratropium for Nebulization 3 milliLiter(s) Nebulizer every 12 hours  artificial  tears Solution 1 Drop(s) Left EYE four times a day  aspirin  chewable 81 milliGRAM(s) Enteral Tube daily  carvedilol 6.25 milliGRAM(s) Oral every 12 hours  chlorhexidine 0.12% Liquid 15 milliLiter(s) Oral Mucosa every 12 hours  chlorhexidine 2% Cloths 1 Application(s) Topical daily  dextrose 5%. 1000 milliLiter(s) (100 mL/Hr) IV Continuous <Continuous>  dextrose 5%. 1000 milliLiter(s) (50 mL/Hr) IV Continuous <Continuous>  dextrose 50% Injectable 25 Gram(s) IV Push once  dextrose 50% Injectable 12.5 Gram(s) IV Push once  dextrose 50% Injectable 25 Gram(s) IV Push once  doxazosin 2 milliGRAM(s) Oral at bedtime  escitalopram Solution 10 milliGRAM(s) Oral daily  furosemide    Tablet 20 milliGRAM(s) Oral daily  glucagon  Injectable 1 milliGRAM(s) IntraMuscular once  heparin   Injectable 5000 Unit(s) SubCutaneous every 8 hours  hydrALAZINE 50 milliGRAM(s) Oral every 8 hours  insulin glargine Injectable (LANTUS) 12 Unit(s) SubCutaneous <User Schedule>  insulin lispro (ADMELOG) corrective regimen sliding scale   SubCutaneous every 6 hours  levETIRAcetam  Solution 500 milliGRAM(s) Oral two times a day  melatonin 6 milliGRAM(s) Oral at bedtime  pantoprazole   Suspension 40 milliGRAM(s) Oral every 12 hours  petrolatum Ophthalmic Ointment 1 Application(s) Left EYE at bedtime  polyethylene glycol 3350 17 Gram(s) Oral daily  senna Syrup 10 milliLiter(s) Oral at bedtime  sucralfate suspension 1 Gram(s) Enteral Tube two times a day  ticagrelor 60 milliGRAM(s) Oral every 12 hours    MEDICATIONS  (PRN):  acetaminophen   Oral Liquid .. 650 milliGRAM(s) Oral every 6 hours PRN Temp greater or equal to 38C (100.4F), Mild Pain (1 - 3), Moderate Pain (4 - 6)  dextrose Oral Gel 15 Gram(s) Oral once PRN Blood Glucose LESS THAN 70 milliGRAM(s)/deciliter  LORazepam     Tablet 0.5 milliGRAM(s) Oral every 6 hours PRN Agitation      LABS:                        9.0    8.91  )-----------( 467      ( 07 Mar 2024 05:39 )             29.2     03-08    141  |  99  |  36<H>  ----------------------------<  132<H>  4.3   |  30  |  1.17    Ca    9.1      08 Mar 2024 05:20  Phos  2.5     03-08  Mg     2.20     03-08        Urinalysis Basic - ( 08 Mar 2024 05:20 )    Color: x / Appearance: x / SG: x / pH: x  Gluc: 132 mg/dL / Ketone: x  / Bili: x / Urobili: x   Blood: x / Protein: x / Nitrite: x   Leuk Esterase: x / RBC: x / WBC x   Sq Epi: x / Non Sq Epi: x / Bacteria: x      Arterial Blood Gas:  03-07 @ 18:30  7.43/49/74/32/96.5/7.2  ABG lactate: --

## 2024-03-09 LAB
ANION GAP SERPL CALC-SCNC: 10 MMOL/L — SIGNIFICANT CHANGE UP (ref 7–14)
BASOPHILS # BLD AUTO: 0.06 K/UL — SIGNIFICANT CHANGE UP (ref 0–0.2)
BASOPHILS NFR BLD AUTO: 0.7 % — SIGNIFICANT CHANGE UP (ref 0–2)
BUN SERPL-MCNC: 38 MG/DL — HIGH (ref 7–23)
CALCIUM SERPL-MCNC: 9 MG/DL — SIGNIFICANT CHANGE UP (ref 8.4–10.5)
CHLORIDE SERPL-SCNC: 99 MMOL/L — SIGNIFICANT CHANGE UP (ref 98–107)
CO2 SERPL-SCNC: 31 MMOL/L — SIGNIFICANT CHANGE UP (ref 22–31)
CREAT SERPL-MCNC: 1.23 MG/DL — SIGNIFICANT CHANGE UP (ref 0.5–1.3)
EGFR: 56 ML/MIN/1.73M2 — LOW
EOSINOPHIL # BLD AUTO: 0.53 K/UL — HIGH (ref 0–0.5)
EOSINOPHIL NFR BLD AUTO: 6.4 % — HIGH (ref 0–6)
GLUCOSE BLDC GLUCOMTR-MCNC: 110 MG/DL — HIGH (ref 70–99)
GLUCOSE BLDC GLUCOMTR-MCNC: 132 MG/DL — HIGH (ref 70–99)
GLUCOSE BLDC GLUCOMTR-MCNC: 83 MG/DL — SIGNIFICANT CHANGE UP (ref 70–99)
GLUCOSE BLDC GLUCOMTR-MCNC: 95 MG/DL — SIGNIFICANT CHANGE UP (ref 70–99)
GLUCOSE SERPL-MCNC: 88 MG/DL — SIGNIFICANT CHANGE UP (ref 70–99)
HCT VFR BLD CALC: 26.7 % — LOW (ref 39–50)
HGB BLD-MCNC: 8.1 G/DL — LOW (ref 13–17)
IANC: 4.91 K/UL — SIGNIFICANT CHANGE UP (ref 1.8–7.4)
IMM GRANULOCYTES NFR BLD AUTO: 0.5 % — SIGNIFICANT CHANGE UP (ref 0–0.9)
LYMPHOCYTES # BLD AUTO: 2.08 K/UL — SIGNIFICANT CHANGE UP (ref 1–3.3)
LYMPHOCYTES # BLD AUTO: 25.2 % — SIGNIFICANT CHANGE UP (ref 13–44)
MAGNESIUM SERPL-MCNC: 2.2 MG/DL — SIGNIFICANT CHANGE UP (ref 1.6–2.6)
MCHC RBC-ENTMCNC: 29 PG — SIGNIFICANT CHANGE UP (ref 27–34)
MCHC RBC-ENTMCNC: 30.3 GM/DL — LOW (ref 32–36)
MCV RBC AUTO: 95.7 FL — SIGNIFICANT CHANGE UP (ref 80–100)
MONOCYTES # BLD AUTO: 0.63 K/UL — SIGNIFICANT CHANGE UP (ref 0–0.9)
MONOCYTES NFR BLD AUTO: 7.6 % — SIGNIFICANT CHANGE UP (ref 2–14)
NEUTROPHILS # BLD AUTO: 4.91 K/UL — SIGNIFICANT CHANGE UP (ref 1.8–7.4)
NEUTROPHILS NFR BLD AUTO: 59.6 % — SIGNIFICANT CHANGE UP (ref 43–77)
NRBC # BLD: 0 /100 WBCS — SIGNIFICANT CHANGE UP (ref 0–0)
NRBC # FLD: 0 K/UL — SIGNIFICANT CHANGE UP (ref 0–0)
PHOSPHATE SERPL-MCNC: 3 MG/DL — SIGNIFICANT CHANGE UP (ref 2.5–4.5)
PLATELET # BLD AUTO: 395 K/UL — SIGNIFICANT CHANGE UP (ref 150–400)
POTASSIUM SERPL-MCNC: 4.5 MMOL/L — SIGNIFICANT CHANGE UP (ref 3.5–5.3)
POTASSIUM SERPL-SCNC: 4.5 MMOL/L — SIGNIFICANT CHANGE UP (ref 3.5–5.3)
RBC # BLD: 2.79 M/UL — LOW (ref 4.2–5.8)
RBC # FLD: 15.9 % — HIGH (ref 10.3–14.5)
SODIUM SERPL-SCNC: 140 MMOL/L — SIGNIFICANT CHANGE UP (ref 135–145)
WBC # BLD: 8.25 K/UL — SIGNIFICANT CHANGE UP (ref 3.8–10.5)
WBC # FLD AUTO: 8.25 K/UL — SIGNIFICANT CHANGE UP (ref 3.8–10.5)

## 2024-03-09 PROCEDURE — 71045 X-RAY EXAM CHEST 1 VIEW: CPT | Mod: 26

## 2024-03-09 RX ORDER — TRANEXAMIC ACID 100 MG/ML
500 INJECTION, SOLUTION INTRAVENOUS EVERY 8 HOURS
Refills: 0 | Status: DISCONTINUED | OUTPATIENT
Start: 2024-03-09 | End: 2024-03-14

## 2024-03-09 RX ADMIN — Medication 0.5 MILLIGRAM(S): at 16:43

## 2024-03-09 RX ADMIN — HEPARIN SODIUM 5000 UNIT(S): 5000 INJECTION INTRAVENOUS; SUBCUTANEOUS at 21:02

## 2024-03-09 RX ADMIN — Medication 2 MILLIGRAM(S): at 21:03

## 2024-03-09 RX ADMIN — TICAGRELOR 60 MILLIGRAM(S): 90 TABLET ORAL at 17:46

## 2024-03-09 RX ADMIN — Medication 6 MILLIGRAM(S): at 21:04

## 2024-03-09 RX ADMIN — ESCITALOPRAM OXALATE 10 MILLIGRAM(S): 10 TABLET, FILM COATED ORAL at 12:00

## 2024-03-09 RX ADMIN — HEPARIN SODIUM 5000 UNIT(S): 5000 INJECTION INTRAVENOUS; SUBCUTANEOUS at 05:17

## 2024-03-09 RX ADMIN — Medication 3 MILLILITER(S): at 07:23

## 2024-03-09 RX ADMIN — INSULIN GLARGINE 12 UNIT(S): 100 INJECTION, SOLUTION SUBCUTANEOUS at 11:21

## 2024-03-09 RX ADMIN — PANTOPRAZOLE SODIUM 40 MILLIGRAM(S): 20 TABLET, DELAYED RELEASE ORAL at 17:46

## 2024-03-09 RX ADMIN — Medication 1 DROP(S): at 11:21

## 2024-03-09 RX ADMIN — Medication 20 MILLIGRAM(S): at 05:21

## 2024-03-09 RX ADMIN — Medication 1 GRAM(S): at 17:47

## 2024-03-09 RX ADMIN — CHLORHEXIDINE GLUCONATE 15 MILLILITER(S): 213 SOLUTION TOPICAL at 05:17

## 2024-03-09 RX ADMIN — Medication 50 MILLIGRAM(S): at 05:18

## 2024-03-09 RX ADMIN — HEPARIN SODIUM 5000 UNIT(S): 5000 INJECTION INTRAVENOUS; SUBCUTANEOUS at 13:20

## 2024-03-09 RX ADMIN — Medication 50 MILLIGRAM(S): at 21:02

## 2024-03-09 RX ADMIN — Medication 0.5 MILLIGRAM(S): at 05:17

## 2024-03-09 RX ADMIN — POLYETHYLENE GLYCOL 3350 17 GRAM(S): 17 POWDER, FOR SOLUTION ORAL at 11:22

## 2024-03-09 RX ADMIN — Medication 2: at 00:03

## 2024-03-09 RX ADMIN — CARVEDILOL PHOSPHATE 6.25 MILLIGRAM(S): 80 CAPSULE, EXTENDED RELEASE ORAL at 17:47

## 2024-03-09 RX ADMIN — LEVETIRACETAM 500 MILLIGRAM(S): 250 TABLET, FILM COATED ORAL at 17:45

## 2024-03-09 RX ADMIN — CARVEDILOL PHOSPHATE 6.25 MILLIGRAM(S): 80 CAPSULE, EXTENDED RELEASE ORAL at 05:17

## 2024-03-09 RX ADMIN — TICAGRELOR 60 MILLIGRAM(S): 90 TABLET ORAL at 05:18

## 2024-03-09 RX ADMIN — Medication 50 MILLIGRAM(S): at 13:20

## 2024-03-09 RX ADMIN — Medication 3 MILLILITER(S): at 21:34

## 2024-03-09 RX ADMIN — CHLORHEXIDINE GLUCONATE 1 APPLICATION(S): 213 SOLUTION TOPICAL at 11:21

## 2024-03-09 RX ADMIN — PANTOPRAZOLE SODIUM 40 MILLIGRAM(S): 20 TABLET, DELAYED RELEASE ORAL at 05:18

## 2024-03-09 RX ADMIN — CHLORHEXIDINE GLUCONATE 15 MILLILITER(S): 213 SOLUTION TOPICAL at 17:46

## 2024-03-09 RX ADMIN — Medication 0.5 MILLIGRAM(S): at 22:12

## 2024-03-09 RX ADMIN — Medication 1 DROP(S): at 17:47

## 2024-03-09 RX ADMIN — Medication 1 GRAM(S): at 05:17

## 2024-03-09 RX ADMIN — LEVETIRACETAM 500 MILLIGRAM(S): 250 TABLET, FILM COATED ORAL at 05:18

## 2024-03-09 RX ADMIN — Medication 81 MILLIGRAM(S): at 11:21

## 2024-03-09 NOTE — PROGRESS NOTE ADULT - SUBJECTIVE AND OBJECTIVE BOX
Aashish Boyer MD  Interventional Cardiology / Advance Heart Failure and Cardiac Transplant Specialist  [place] Office : [address] [place] [address] [place] [address]  Tel:   [place] Office : [address] [facility] [place] [address]  Tel: [phone]       Pt is lying in bed in RCU not in distress  	  MEDICATIONS:  aspirin  chewable 81 milliGRAM(s) Enteral Tube daily  carvedilol 6.25 milliGRAM(s) Oral every 12 hours  doxazosin 2 milliGRAM(s) Oral at bedtime  furosemide    Tablet 20 milliGRAM(s) Oral daily  heparin   Injectable 5000 Unit(s) SubCutaneous every 8 hours  hydrALAZINE 50 milliGRAM(s) Oral every 8 hours  ticagrelor 60 milliGRAM(s) Oral every 12 hours      albuterol/ipratropium for Nebulization 3 milliLiter(s) Nebulizer every 12 hours    acetaminophen   Oral Liquid .. 650 milliGRAM(s) Oral every 6 hours PRN  escitalopram Solution 10 milliGRAM(s) Oral daily  levETIRAcetam  Solution 500 milliGRAM(s) Oral two times a day  LORazepam     Tablet 0.5 milliGRAM(s) Oral every 6 hours PRN  melatonin 6 milliGRAM(s) Oral at bedtime    pantoprazole   Suspension 40 milliGRAM(s) Oral every 12 hours  polyethylene glycol 3350 17 Gram(s) Oral daily  senna Syrup 10 milliLiter(s) Oral at bedtime  sucralfate suspension 1 Gram(s) Enteral Tube two times a day    dextrose 50% Injectable 25 Gram(s) IV Push once  dextrose 50% Injectable 12.5 Gram(s) IV Push once  dextrose 50% Injectable 25 Gram(s) IV Push once  dextrose Oral Gel 15 Gram(s) Oral once PRN  glucagon  Injectable 1 milliGRAM(s) IntraMuscular once  insulin glargine Injectable (LANTUS) 12 Unit(s) SubCutaneous <User Schedule>  insulin lispro (ADMELOG) corrective regimen sliding scale   SubCutaneous every 6 hours    artificial  tears Solution 1 Drop(s) Left EYE four times a day  chlorhexidine 0.12% Liquid 15 milliLiter(s) Oral Mucosa every 12 hours  chlorhexidine 2% Cloths 1 Application(s) Topical daily  darbepoetin Injectable ViaL 60 MICROGram(s) SubCutaneous once  dextrose 5%. 1000 milliLiter(s) IV Continuous <Continuous>  dextrose 5%. 1000 milliLiter(s) IV Continuous <Continuous>  petrolatum Ophthalmic Ointment 1 Application(s) Left EYE at bedtime      PAST MEDICAL/SURGICAL HISTORY  PAST MEDICAL & SURGICAL HISTORY:  Hyperlipemia      Hypertension      Coronary Artery Disease      Diabetes Mellitus Type II      Stented Coronary Artery  total 5 stents, last stent 5/2019      Neuropathy      Myocardial infarction      Stroke  mild left facial numbness   no other residuals verbalized      Myoclonic jerking      Stage 3 chronic kidney disease      History of Cataract Extraction      Hx of CABG      H/O coronary angiogram      S/P coronary artery stent placement  1/6/09      S/P placement of cardiac pacemaker          SOCIAL HISTORY: Substance Use (street drugs): ( x ) never used  (  ) other:    FAMILY HISTORY:  No pertinent family history in first degree relatives             PHYSICAL EXAM:  T(C): 36.6 (03-09-24 @ 08:01), Max: 36.8 (03-09-24 @ 04:00)  HR: 82 (03-09-24 @ 10:51) (79 - 94)  BP: 127/57 (03-09-24 @ 08:01) (112/51 - 150/66)  RR: 15 (03-09-24 @ 08:01) (15 - 18)  SpO2: 100% (03-09-24 @ 10:51) (98% - 100%)  Wt(kg): --  I&O's Summary    08 Mar 2024 07:01  -  09 Mar 2024 07:00  --------------------------------------------------------  IN: 720 mL / OUT: 610 mL / NET: 110 mL          GENERAL: s/p trach  EYES:   PERRLA   ENMT:   Moist mucous membranes, Good dentition, No lesions  Cardiovascular: Normal S1 S2, No JVD, No murmurs, No edema  Respiratory: Lungs clear to auscultation	  Gastrointestinal:  s/p PEG   Extremities: no edema                                      8.1    8.25  )-----------( 395      ( 09 Mar 2024 05:25 )             26.7     03-09    140  |  99  |  38<H>  ----------------------------<  88  4.5   |  31  |  1.23    Ca    9.0      09 Mar 2024 05:25  Phos  3.0     03-09  Mg     2.20     03-09      proBNP:   Lipid Profile:   HgA1c:   TSH:     Consultant(s) Notes Reviewed:  [x ] YES  [ ] NO    Care Discussed with Consultants/Other Providers [ x] YES  [ ] NO    Imaging Personally Reviewed independently:  [x] YES  [ ] NO    All labs, radiologic studies, vitals, orders and medications list reviewed. Patient is seen and examined at bedside. Case discussed with medical team.

## 2024-03-09 NOTE — PROGRESS NOTE ADULT - ASSESSMENT
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 Course ultimately complicated by progressive WOB and O2 demand and patient intubated and transferred to MICU (1/22). Course further complicated by acute cholecystitis and s/p Perc Lynsey with IR on (1/26), HFrEF 38, pulmonary edema, superimposed PNA, failed extubation failed and prolonged vent time and s/p trach (2/13). Patient transferred to RCU (2/16) and s/p PEG (2/20). Course complicated by volume overload and diuresis and GDMT continued and HFrEF 45 with improvement    NEUROLOGY   # Encephalopathy   - Hx of CVA with no residual deficits per family, however per family worsening confusion at home over the past 6 months (becoming disoriented to time) but prior to admission has been more confused, talking about seeing people that are not present.  - CTH (1/31) with no evidence of acute infarct  - Continue on ASA and Brilinta  - Holding Neurontin, Memantine and Oxycodone in setting of somnolence    # ICA stenosis   - CTA NECK (1/31) with moderate to severe narrowing left internal carotid at the origin. Severe narrowing right internal carotid by a tandem lesion 1.5 cm above the bifurcation with a narrowed internal carotid beyond the lesion. Extensive calcified plaque both cavernous and supraclinoid carotid arteries with narrowing but no occlusion. Mild narrowing proximal right M1 segment. Moderate narrowing both vertebral V4 segments. No perfusion abnormality at the Tmax 6 second threshold, but moderate hypoperfusion in the right MCA territory at the 4 second threshold.   - Continue on ASA and Brilinta    # Myoclonic jerks   - Hx of myoclonic jerks post CVAs   - EEG (1/18-2/6) negative for seizures  - Continue on Keppra and per neuro wishes to wean, however family wishes to keep current dose as home medication.     # Depression/ Insomnia  - Continue on Lexapro per home medication   - Course complicated by agitation and pulling on tubes   - Continue on Seroquel but QTC prolonged and now held   - Continue on Ativan 0.5mg PO Q6H PRN  - Supportive care     CARDIOLOGY   # Vasoplegic vs septic shock  # Hx of HTN   - Weaned off pressors in ICU and now with mild hypertension   - Continue on coreg and hydral as below   - Strict BP control given moderate AS    # AFIB RVR   # Bifascicular Block with Medtronic PPM   - AFIB RVR episodes and PPM interrogated (2/20) by EP with similar episodes  - Previous admission in 6/2022 with cardiogenic syncope second to bifasicular block, however now with PPM and AV myron blockers ok.   - Continue on lopressor 12.5mg BID however replaced with coreg second to HFrEF   - AC discussed at length however with recent GIB will hold for now     # HFrEF 38 likely stress induced?   # Mild to moderate AS   - ECHO (12/2023) with EF 62 with normal LVSF and mild to moderate AS   - ECHO (2/2024) with EF 38, moderate LVSD with global LV hypokinesis, mildly reduced RVSF (TAPSE 1.3), mild to moderate MR, mild AR, and moderate AS.   - RPT ECHO (3/4) with EF 45 and mild to moderate LVSD   - Continue on lasix 20 QD, coreg 6.25 and hydralazine 50 (increased 3/4)   - Hold isordil and up titrate above medications first     # CAD with CABG   - CATH (5/2022) with dLAD 70, SVG graft to OM1 with 90 in stent stenosis s/p PCI and GEMMA placement, and LIMA graft to mLAD with no disease.   - Continue on ASA and brilinta    # Prolonged QTC   - ECG (3/2) with QTC 616ms (corrected using bazzet 490ms)   - ECG (3/3) with QTC 634ms (corrected using bazzet 490ms)   - Monitor off/ avoid QTC prolonging agents for now    RESPIRATORY   # Acute respiratory failure second to SARS-COV-2 vs pulm edema vs PNA  - Presented with COVID complicated by sepsis second to acute lynsey and worsening respiratory failure second to pulmonary edema requiring intubation.   - Prolonged intubation and s/p tracheostomy (2/13)   - CT CHEST 1/16 with new small bilateral pleural effusions/ atelectasis/ patchy bilateral ground-glass opacities are consistent with pulmonary edema.  - Completed ABX and COVID regimen as below   - Continue on vent and initially tolerating some SBT 10/5   - Continue on nebs and hold further HTS given intermittent hemoptysis  - Continue on diuresis as above    # Mild hemoptysis likely from suction trauma   - s/p bronch (2/18) with no active bleed  - Hemoptysis improved with TXA and holding further HTS as above   - Tiny hemoptysis second to suction trauma imporving  - Careful with suctioning     HEENT   # L eye subconjunctival hemorrhage   - Continue on artifical tears and Lacrilube   - Optho eval appreciated     GI  # Nutrition/ Dysphagia   - PEG placed by GI on 2/20 and tube feeds continued.   - Loose stools and Banatrol continued     # UGIB   - EGD (1/2) with esophagitis and bleeding Dieulafoy's lesion s/p clips.   - Continue on PPI and carafate     # SMA calcification   - CTA demonstrating mesenteric fat stranding associated with ascending/transverse colon  - Diagnostic laparoscopy (12/24) with small bowel and visible colon viable, some inflammation of omentum in RUQ  - SMA Angiogram and stent placed (12/25).   - Continue on ASA and Brilinta     # Acute Cholecystitis   - CT (12/26) with distended GB with cholelithiasis and sludge   - HIDA (12/29) POSITIVE for acute cholecystitis   - Case discussed with IR at length and s/p PERC LYNSEY (1/26)   - Completed zosyn course (12/24-12/31)   - PERC LYNSEY tube to stay in until surgical candidate for cholecystectomy to prevent recurrence     / RENAL   # CKD   - CRE at baseline   - Monitor renal function and UOP   - Aranesp SQ x1 (3/8)    # Hypernatremia   -  Q4H added however sodium downtrending and FWF dc'ed 3/4   - Monitor closely with diuresis as above   -  BID restarted per Renal (3/8)    INFECTIOUS DISEASE   # COVID with superimposed PNA   # ESBL Kleb PNA   - COVID POSITIVE (12/23) and completed remdesivir x 1 dose and paxlovid course.   - WOB worsened as above requiring intubation and cultures negative. Zosyn completed empirically however hypothermic (2/11) and BCx, UA, RVP and BAL negative.   - Completed additional zosyn (2/12-2/19) empirically   - Fever spike and thick secretions and SCX (2/26) with ESBL klebs.   - s/p Zosyn (2/27 - 2/29) but resistant and completed Erta (2/29 - 3/6)     HEME  # AOCD   - Monitor HH   - DVT PPX with HSQ     ENDOCRINE   # DM2 A1C 9.3   - Continue on Lantus 12 and ISS     SKIN/ TUBES   - Trach (2/13)   - PEG (2/20)   - PERC LYNSEY (1/26 - )     ETHICS   - FULL CODE     DISPO - vent facility and family aware. SW with accepting facility however pending available bed.  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 Course ultimately complicated by progressive WOB and O2 demand and patient intubated and transferred to MICU (1/22). Course further complicated by acute cholecystitis and s/p Perc Lynsey with IR on (1/26), HFrEF 38, pulmonary edema, superimposed PNA, failed extubation failed and prolonged vent time and s/p trach (2/13). Patient transferred to RCU (2/16) and s/p PEG (2/20). Course complicated by volume overload and diuresis and GDMT continued and HFrEF 45 with improvement    NEUROLOGY   # Encephalopathy   - Hx of CVA with no residual deficits per family, however per family worsening confusion at home over the past 6 months (becoming disoriented to time) but prior to admission has been more confused, talking about seeing people that are not present.  - CTH (1/31) with no evidence of acute infarct  - Continue on ASA and Brilinta  - Holding Neurontin, Memantine and Oxycodone in setting of somnolence    # ICA stenosis   - CTA NECK (1/31) with moderate to severe narrowing left internal carotid at the origin. Severe narrowing right internal carotid by a tandem lesion 1.5 cm above the bifurcation with a narrowed internal carotid beyond the lesion. Extensive calcified plaque both cavernous and supraclinoid carotid arteries with narrowing but no occlusion. Mild narrowing proximal right M1 segment. Moderate narrowing both vertebral V4 segments. No perfusion abnormality at the Tmax 6 second threshold, but moderate hypoperfusion in the right MCA territory at the 4 second threshold.   - Continue on ASA and Brilinta    # Myoclonic jerks   - Hx of myoclonic jerks post CVAs   - EEG (1/18-2/6) negative for seizures  - Continue on Keppra and per neuro wishes to wean, however family wishes to keep current dose as home medication.     # Depression/ Insomnia  - Continue on Lexapro per home medication   - Course complicated by agitation and pulling on tubes   - Continue on Seroquel but QTC prolonged and now held   - Continue on Ativan 0.5mg PO Q6H PRN  - Supportive care     CARDIOLOGY   # Vasoplegic vs septic shock  # Hx of HTN   - Weaned off pressors in ICU and now with mild hypertension   - Continue on coreg and hydral as below   - Strict BP control given moderate AS    # AFIB RVR   # Bifascicular Block with Medtronic PPM   - AFIB RVR episodes and PPM interrogated (2/20) by EP with similar episodes  - Previous admission in 6/2022 with cardiogenic syncope second to bifasicular block, however now with PPM and AV myron blockers ok.   - Continue on lopressor 12.5mg BID however replaced with coreg second to HFrEF   - AC discussed at length however with recent GIB will hold for now     # HFrEF 38 likely stress induced?   # Mild to moderate AS   - ECHO (12/2023) with EF 62 with normal LVSF and mild to moderate AS   - ECHO (2/2024) with EF 38, moderate LVSD with global LV hypokinesis, mildly reduced RVSF (TAPSE 1.3), mild to moderate MR, mild AR, and moderate AS.   - RPT ECHO (3/4) with EF 45 and mild to moderate LVSD   - Continue on lasix 20 QD, coreg 6.25 and hydralazine 50 (increased 3/4)   - Hold isordil and up titrate above medications first     # CAD with CABG   - CATH (5/2022) with dLAD 70, SVG graft to OM1 with 90 in stent stenosis s/p PCI and GEMMA placement, and LIMA graft to mLAD with no disease.   - Continue on ASA and brilinta    # Prolonged QTC   - ECG (3/2) with QTC 616ms (corrected using bazzet 490ms)   - ECG (3/3) with QTC 634ms (corrected using bazzet 490ms)   - Monitor off/ avoid QTC prolonging agents for now    RESPIRATORY   # Acute respiratory failure second to SARS-COV-2 vs pulm edema vs PNA  - Presented with COVID complicated by sepsis second to acute lynsey and worsening respiratory failure second to pulmonary edema requiring intubation.   - Prolonged intubation and s/p tracheostomy (2/13)   - CT CHEST 1/16 with new small bilateral pleural effusions/ atelectasis/ patchy bilateral ground-glass opacities are consistent with pulmonary edema.  - Completed ABX and COVID regimen as below   - Continue on vent and initially tolerating some SBT 10/5   - Continue on nebs and hold further HTS given intermittent hemoptysis  - Continue on diuresis as above    # Mild hemoptysis likely from suction trauma   - s/p bronch (2/18) with no active bleed  - Hemoptysis improved with TXA and holding further HTS as above   - Tiny hemoptysis second to suction trauma imporving  - Careful with suctioning   - Recurrent hemoptysis (3/9) so will trial TXA nebs again     HEENT   # L eye subconjunctival hemorrhage   - Continue on artifical tears and Lacrilube   - Optho eval appreciated     GI  # Nutrition/ Dysphagia   - PEG placed by GI on 2/20 and tube feeds continued.   - Loose stools and Banatrol continued     # UGIB   - EGD (1/2) with esophagitis and bleeding Dieulafoy's lesion s/p clips.   - Continue on PPI and carafate     # SMA calcification   - CTA demonstrating mesenteric fat stranding associated with ascending/transverse colon  - Diagnostic laparoscopy (12/24) with small bowel and visible colon viable, some inflammation of omentum in RUQ  - SMA Angiogram and stent placed (12/25).   - Continue on ASA and Brilinta     # Acute Cholecystitis   - CT (12/26) with distended GB with cholelithiasis and sludge   - HIDA (12/29) POSITIVE for acute cholecystitis   - Case discussed with IR at length and s/p PERC LYNSEY (1/26)   - Completed zosyn course (12/24-12/31)   - PERC LYNSEY tube to stay in until surgical candidate for cholecystectomy to prevent recurrence     / RENAL   # CKD   - CRE at baseline   - Monitor renal function and UOP   - Aranesp SQ x1 (3/8)    # Hypernatremia   -  Q4H added however sodium downtrending and FWF dc'ed 3/4   - Monitor closely with diuresis as above   -  BID restarted per Renal (3/8)    INFECTIOUS DISEASE   # COVID with superimposed PNA   # ESBL Kleb PNA   - COVID POSITIVE (12/23) and completed remdesivir x 1 dose and paxlovid course.   - WOB worsened as above requiring intubation and cultures negative. Zosyn completed empirically however hypothermic (2/11) and BCx, UA, RVP and BAL negative.   - Completed additional zosyn (2/12-2/19) empirically   - Fever spike and thick secretions and SCX (2/26) with ESBL klebs.   - s/p Zosyn (2/27 - 2/29) but resistant and completed Erta (2/29 - 3/6)     HEME  # AOCD   - Monitor HH   - DVT PPX with HSQ     ENDOCRINE   # DM2 A1C 9.3   - Continue on Lantus 12 and ISS     SKIN/ TUBES   - Trach (2/13)   - PEG (2/20)   - PERC LYNSEY (1/26 - )     ETHICS   - FULL CODE     DISPO - vent facility and family aware. SW with accepting facility however pending available bed.

## 2024-03-09 NOTE — CHART NOTE - NSCHARTNOTEFT_GEN_A_CORE
Patient noted with bloody secretions in vent tubing this morning. Per RN suction tubing was cleaned out using water this morning and thus canister contained ~200cc light red liquid (presumably bloody mixed with irrigation water). This afternoon reassessed patient and still with hemoptysis with now approximately 250-300ml light red fluid in suction canister. Will proceed with TXA nebs and repeat Hgb this evening to ensure no hemodynamically significant bleed. BP remains stable. Will d/w MICU if no improvement with TXA.

## 2024-03-09 NOTE — PROGRESS NOTE ADULT - SUBJECTIVE AND OBJECTIVE BOX
CHIEF COMPLAINT:Patient is a 89y old  Male who presents with a chief complaint of COVID19, Chest pain (08 Mar 2024 11:22)      INTERVAL EVENTS:     ROS: Seen by bedside during AM rounds     OBJECTIVE:  ICU Vital Signs Last 24 Hrs  T(C): 36.6 (09 Mar 2024 05:26), Max: 36.8 (09 Mar 2024 04:00)  T(F): 97.8 (09 Mar 2024 05:26), Max: 98.2 (09 Mar 2024 04:00)  HR: 87 (09 Mar 2024 07:14) (79 - 94)  BP: 120/60 (09 Mar 2024 05:26) (112/51 - 150/66)  BP(mean): 87 (08 Mar 2024 16:29) (75 - 87)  ABP: --  ABP(mean): --  RR: 18 (09 Mar 2024 05:26) (15 - 29)  SpO2: 100% (09 Mar 2024 07:14) (96% - 100%)    O2 Parameters below as of 09 Mar 2024 05:26  Patient On (Oxygen Delivery Method): ventilator    O2 Concentration (%): 30      Mode: AC/ CMV (Assist Control/ Continuous Mandatory Ventilation), RR (machine): 12, TV (machine): 450, FiO2: 40, PEEP: 5, ITime: 0.76, MAP: 13, PIP: 40    03-08 @ 07:01  -  03-09 @ 07:00  --------------------------------------------------------  IN: 720 mL / OUT: 610 mL / NET: 110 mL      CAPILLARY BLOOD GLUCOSE      POCT Blood Glucose.: 110 mg/dL (09 Mar 2024 05:27)      PHYSICAL EXAM:  General:   HEENT:   Lymph Nodes:  Neck:   Respiratory:   Cardiovascular:   Abdomen:   Extremities:   Skin:   Neurological:  Psychiatry:    Mode: AC/ CMV (Assist Control/ Continuous Mandatory Ventilation)  RR (machine): 12  TV (machine): 450  FiO2: 40  PEEP: 5  ITime: 0.76  MAP: 13  PIP: 40      HOSPITAL MEDICATIONS:  MEDICATIONS  (STANDING):  albuterol/ipratropium for Nebulization 3 milliLiter(s) Nebulizer every 12 hours  artificial  tears Solution 1 Drop(s) Left EYE four times a day  aspirin  chewable 81 milliGRAM(s) Enteral Tube daily  carvedilol 6.25 milliGRAM(s) Oral every 12 hours  chlorhexidine 0.12% Liquid 15 milliLiter(s) Oral Mucosa every 12 hours  chlorhexidine 2% Cloths 1 Application(s) Topical daily  darbepoetin Injectable ViaL 60 MICROGram(s) SubCutaneous once  dextrose 5%. 1000 milliLiter(s) (100 mL/Hr) IV Continuous <Continuous>  dextrose 5%. 1000 milliLiter(s) (50 mL/Hr) IV Continuous <Continuous>  dextrose 50% Injectable 25 Gram(s) IV Push once  dextrose 50% Injectable 12.5 Gram(s) IV Push once  dextrose 50% Injectable 25 Gram(s) IV Push once  doxazosin 2 milliGRAM(s) Oral at bedtime  escitalopram Solution 10 milliGRAM(s) Oral daily  furosemide    Tablet 20 milliGRAM(s) Oral daily  glucagon  Injectable 1 milliGRAM(s) IntraMuscular once  heparin   Injectable 5000 Unit(s) SubCutaneous every 8 hours  hydrALAZINE 50 milliGRAM(s) Oral every 8 hours  insulin glargine Injectable (LANTUS) 12 Unit(s) SubCutaneous <User Schedule>  insulin lispro (ADMELOG) corrective regimen sliding scale   SubCutaneous every 6 hours  levETIRAcetam  Solution 500 milliGRAM(s) Oral two times a day  melatonin 6 milliGRAM(s) Oral at bedtime  pantoprazole   Suspension 40 milliGRAM(s) Oral every 12 hours  petrolatum Ophthalmic Ointment 1 Application(s) Left EYE at bedtime  polyethylene glycol 3350 17 Gram(s) Oral daily  senna Syrup 10 milliLiter(s) Oral at bedtime  sucralfate suspension 1 Gram(s) Enteral Tube two times a day  ticagrelor 60 milliGRAM(s) Oral every 12 hours    MEDICATIONS  (PRN):  acetaminophen   Oral Liquid .. 650 milliGRAM(s) Oral every 6 hours PRN Temp greater or equal to 38C (100.4F), Mild Pain (1 - 3), Moderate Pain (4 - 6)  dextrose Oral Gel 15 Gram(s) Oral once PRN Blood Glucose LESS THAN 70 milliGRAM(s)/deciliter  LORazepam     Tablet 0.5 milliGRAM(s) Oral every 6 hours PRN Agitation      LABS:    03-08    141  |  99  |  36<H>  ----------------------------<  132<H>  4.3   |  30  |  1.17    Ca    9.1      08 Mar 2024 05:20  Phos  2.5     03-08  Mg     2.20     03-08        Urinalysis Basic - ( 08 Mar 2024 05:20 )    Color: x / Appearance: x / SG: x / pH: x  Gluc: 132 mg/dL / Ketone: x  / Bili: x / Urobili: x   Blood: x / Protein: x / Nitrite: x   Leuk Esterase: x / RBC: x / WBC x   Sq Epi: x / Non Sq Epi: x / Bacteria: x      Arterial Blood Gas:  03-07 @ 18:30  7.43/49/74/32/96.5/7.2  ABG lactate: --     CHIEF COMPLAINT:Patient is a 89y old  Male who presents with a chief complaint of COVID19, Chest pain (08 Mar 2024 11:22)      INTERVAL EVENTS:     ROS: Seen by bedside during AM rounds     OBJECTIVE:  ICU Vital Signs Last 24 Hrs  T(C): 36.6 (09 Mar 2024 05:26), Max: 36.8 (09 Mar 2024 04:00)  T(F): 97.8 (09 Mar 2024 05:26), Max: 98.2 (09 Mar 2024 04:00)  HR: 87 (09 Mar 2024 07:14) (79 - 94)  BP: 120/60 (09 Mar 2024 05:26) (112/51 - 150/66)  BP(mean): 87 (08 Mar 2024 16:29) (75 - 87)  ABP: --  ABP(mean): --  RR: 18 (09 Mar 2024 05:26) (15 - 29)  SpO2: 100% (09 Mar 2024 07:14) (96% - 100%)    O2 Parameters below as of 09 Mar 2024 05:26  Patient On (Oxygen Delivery Method): ventilator    O2 Concentration (%): 30      Mode: AC/ CMV (Assist Control/ Continuous Mandatory Ventilation), RR (machine): 12, TV (machine): 450, FiO2: 40, PEEP: 5, ITime: 0.76, MAP: 13, PIP: 40    03-08 @ 07:01  -  03-09 @ 07:00  --------------------------------------------------------  IN: 720 mL / OUT: 610 mL / NET: 110 mL      CAPILLARY BLOOD GLUCOSE      POCT Blood Glucose.: 110 mg/dL (09 Mar 2024 05:27)      PHYSICAL EXAM:  General: NAD   Neck: (+) Trach tube noted, site c/d/i. Some bloody secretions noted in vent tubing and in suction canister. ~200cc light red liquid noted in suction canister( however mixed with water per RN after cleaning vent suction tubing)  Cards: S1/S2, no murmurs   Pulm: Rhonchorous b/l. No resp distress.   Abdomen: Soft, NTND. (+) PEG noted, site c/d/i. (+)Biliary drain in place, draining bilious fluid.   Extremities: 1-2+ b/l LE pitting edema.   Neurology: awake/eyes open/alert. Tracks at times. Nonverbal. Not following commands.   Skin: warm to touch, color appropriate for ethnicity. Refer to RN assessment for further details.      Mode: AC/ CMV (Assist Control/ Continuous Mandatory Ventilation)  RR (machine): 12  TV (machine): 450  FiO2: 40  PEEP: 5  ITime: 0.76  MAP: 13  PIP: 40      HOSPITAL MEDICATIONS:  MEDICATIONS  (STANDING):  albuterol/ipratropium for Nebulization 3 milliLiter(s) Nebulizer every 12 hours  artificial  tears Solution 1 Drop(s) Left EYE four times a day  aspirin  chewable 81 milliGRAM(s) Enteral Tube daily  carvedilol 6.25 milliGRAM(s) Oral every 12 hours  chlorhexidine 0.12% Liquid 15 milliLiter(s) Oral Mucosa every 12 hours  chlorhexidine 2% Cloths 1 Application(s) Topical daily  darbepoetin Injectable ViaL 60 MICROGram(s) SubCutaneous once  dextrose 5%. 1000 milliLiter(s) (100 mL/Hr) IV Continuous <Continuous>  dextrose 5%. 1000 milliLiter(s) (50 mL/Hr) IV Continuous <Continuous>  dextrose 50% Injectable 25 Gram(s) IV Push once  dextrose 50% Injectable 12.5 Gram(s) IV Push once  dextrose 50% Injectable 25 Gram(s) IV Push once  doxazosin 2 milliGRAM(s) Oral at bedtime  escitalopram Solution 10 milliGRAM(s) Oral daily  furosemide    Tablet 20 milliGRAM(s) Oral daily  glucagon  Injectable 1 milliGRAM(s) IntraMuscular once  heparin   Injectable 5000 Unit(s) SubCutaneous every 8 hours  hydrALAZINE 50 milliGRAM(s) Oral every 8 hours  insulin glargine Injectable (LANTUS) 12 Unit(s) SubCutaneous <User Schedule>  insulin lispro (ADMELOG) corrective regimen sliding scale   SubCutaneous every 6 hours  levETIRAcetam  Solution 500 milliGRAM(s) Oral two times a day  melatonin 6 milliGRAM(s) Oral at bedtime  pantoprazole   Suspension 40 milliGRAM(s) Oral every 12 hours  petrolatum Ophthalmic Ointment 1 Application(s) Left EYE at bedtime  polyethylene glycol 3350 17 Gram(s) Oral daily  senna Syrup 10 milliLiter(s) Oral at bedtime  sucralfate suspension 1 Gram(s) Enteral Tube two times a day  ticagrelor 60 milliGRAM(s) Oral every 12 hours    MEDICATIONS  (PRN):  acetaminophen   Oral Liquid .. 650 milliGRAM(s) Oral every 6 hours PRN Temp greater or equal to 38C (100.4F), Mild Pain (1 - 3), Moderate Pain (4 - 6)  dextrose Oral Gel 15 Gram(s) Oral once PRN Blood Glucose LESS THAN 70 milliGRAM(s)/deciliter  LORazepam     Tablet 0.5 milliGRAM(s) Oral every 6 hours PRN Agitation      LABS:    03-08    141  |  99  |  36<H>  ----------------------------<  132<H>  4.3   |  30  |  1.17    Ca    9.1      08 Mar 2024 05:20  Phos  2.5     03-08  Mg     2.20     03-08        Urinalysis Basic - ( 08 Mar 2024 05:20 )    Color: x / Appearance: x / SG: x / pH: x  Gluc: 132 mg/dL / Ketone: x  / Bili: x / Urobili: x   Blood: x / Protein: x / Nitrite: x   Leuk Esterase: x / RBC: x / WBC x   Sq Epi: x / Non Sq Epi: x / Bacteria: x      Arterial Blood Gas:  03-07 @ 18:30  7.43/49/74/32/96.5/7.2  ABG lactate: --     CHIEF COMPLAINT:Patient is a 89y old  Male who presents with a chief complaint of COVID19, Chest pain (08 Mar 2024 11:22)      INTERVAL EVENTS: no acute overnight events noted.     ROS: Seen by bedside during AM rounds     OBJECTIVE:  ICU Vital Signs Last 24 Hrs  T(C): 36.6 (09 Mar 2024 05:26), Max: 36.8 (09 Mar 2024 04:00)  T(F): 97.8 (09 Mar 2024 05:26), Max: 98.2 (09 Mar 2024 04:00)  HR: 87 (09 Mar 2024 07:14) (79 - 94)  BP: 120/60 (09 Mar 2024 05:26) (112/51 - 150/66)  BP(mean): 87 (08 Mar 2024 16:29) (75 - 87)  ABP: --  ABP(mean): --  RR: 18 (09 Mar 2024 05:26) (15 - 29)  SpO2: 100% (09 Mar 2024 07:14) (96% - 100%)    O2 Parameters below as of 09 Mar 2024 05:26  Patient On (Oxygen Delivery Method): ventilator    O2 Concentration (%): 30      Mode: AC/ CMV (Assist Control/ Continuous Mandatory Ventilation), RR (machine): 12, TV (machine): 450, FiO2: 40, PEEP: 5, ITime: 0.76, MAP: 13, PIP: 40    03-08 @ 07:01  -  03-09 @ 07:00  --------------------------------------------------------  IN: 720 mL / OUT: 610 mL / NET: 110 mL      CAPILLARY BLOOD GLUCOSE      POCT Blood Glucose.: 110 mg/dL (09 Mar 2024 05:27)      PHYSICAL EXAM:  General: NAD   Neck: (+) Trach tube noted, site c/d/i. Some bloody secretions noted in vent tubing and in suction canister. ~200cc light red liquid noted in suction canister( however mixed with water per RN after cleaning vent suction tubing)  Cards: S1/S2, no murmurs   Pulm: Rhonchorous b/l. No resp distress.   Abdomen: Soft, NTND. (+) PEG noted, site c/d/i. (+)Biliary drain in place, draining bilious fluid.   Extremities: 1-2+ b/l LE pitting edema.   Neurology: awake/eyes open/alert. Tracks at times. Nonverbal. Not following commands.   Skin: warm to touch, color appropriate for ethnicity. Refer to RN assessment for further details.      Mode: AC/ CMV (Assist Control/ Continuous Mandatory Ventilation)  RR (machine): 12  TV (machine): 450  FiO2: 40  PEEP: 5  ITime: 0.76  MAP: 13  PIP: 40      HOSPITAL MEDICATIONS:  MEDICATIONS  (STANDING):  albuterol/ipratropium for Nebulization 3 milliLiter(s) Nebulizer every 12 hours  artificial  tears Solution 1 Drop(s) Left EYE four times a day  aspirin  chewable 81 milliGRAM(s) Enteral Tube daily  carvedilol 6.25 milliGRAM(s) Oral every 12 hours  chlorhexidine 0.12% Liquid 15 milliLiter(s) Oral Mucosa every 12 hours  chlorhexidine 2% Cloths 1 Application(s) Topical daily  darbepoetin Injectable ViaL 60 MICROGram(s) SubCutaneous once  dextrose 5%. 1000 milliLiter(s) (100 mL/Hr) IV Continuous <Continuous>  dextrose 5%. 1000 milliLiter(s) (50 mL/Hr) IV Continuous <Continuous>  dextrose 50% Injectable 25 Gram(s) IV Push once  dextrose 50% Injectable 12.5 Gram(s) IV Push once  dextrose 50% Injectable 25 Gram(s) IV Push once  doxazosin 2 milliGRAM(s) Oral at bedtime  escitalopram Solution 10 milliGRAM(s) Oral daily  furosemide    Tablet 20 milliGRAM(s) Oral daily  glucagon  Injectable 1 milliGRAM(s) IntraMuscular once  heparin   Injectable 5000 Unit(s) SubCutaneous every 8 hours  hydrALAZINE 50 milliGRAM(s) Oral every 8 hours  insulin glargine Injectable (LANTUS) 12 Unit(s) SubCutaneous <User Schedule>  insulin lispro (ADMELOG) corrective regimen sliding scale   SubCutaneous every 6 hours  levETIRAcetam  Solution 500 milliGRAM(s) Oral two times a day  melatonin 6 milliGRAM(s) Oral at bedtime  pantoprazole   Suspension 40 milliGRAM(s) Oral every 12 hours  petrolatum Ophthalmic Ointment 1 Application(s) Left EYE at bedtime  polyethylene glycol 3350 17 Gram(s) Oral daily  senna Syrup 10 milliLiter(s) Oral at bedtime  sucralfate suspension 1 Gram(s) Enteral Tube two times a day  ticagrelor 60 milliGRAM(s) Oral every 12 hours    MEDICATIONS  (PRN):  acetaminophen   Oral Liquid .. 650 milliGRAM(s) Oral every 6 hours PRN Temp greater or equal to 38C (100.4F), Mild Pain (1 - 3), Moderate Pain (4 - 6)  dextrose Oral Gel 15 Gram(s) Oral once PRN Blood Glucose LESS THAN 70 milliGRAM(s)/deciliter  LORazepam     Tablet 0.5 milliGRAM(s) Oral every 6 hours PRN Agitation      LABS:    03-08    141  |  99  |  36<H>  ----------------------------<  132<H>  4.3   |  30  |  1.17    Ca    9.1      08 Mar 2024 05:20  Phos  2.5     03-08  Mg     2.20     03-08        Urinalysis Basic - ( 08 Mar 2024 05:20 )    Color: x / Appearance: x / SG: x / pH: x  Gluc: 132 mg/dL / Ketone: x  / Bili: x / Urobili: x   Blood: x / Protein: x / Nitrite: x   Leuk Esterase: x / RBC: x / WBC x   Sq Epi: x / Non Sq Epi: x / Bacteria: x      Arterial Blood Gas:  03-07 @ 18:30  7.43/49/74/32/96.5/7.2  ABG lactate: --

## 2024-03-09 NOTE — PROGRESS NOTE ADULT - NS ATTEND AMEND GEN_ALL_CORE FT
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 Transferred to [facility] on 2/16.     - stable on weaning trials PS 15; failing due to apneas/weakness  - cardiac GDMT  - last abx 3/6 (ertapenem 2/29-3/6 for ESBL kleb pna) without further fevers  - stable for discharge

## 2024-03-10 LAB
ANION GAP SERPL CALC-SCNC: 12 MMOL/L — SIGNIFICANT CHANGE UP (ref 7–14)
BASOPHILS # BLD AUTO: 0.07 K/UL — SIGNIFICANT CHANGE UP (ref 0–0.2)
BASOPHILS NFR BLD AUTO: 0.9 % — SIGNIFICANT CHANGE UP (ref 0–2)
BLD GP AB SCN SERPL QL: NEGATIVE — SIGNIFICANT CHANGE UP
BUN SERPL-MCNC: 42 MG/DL — HIGH (ref 7–23)
CALCIUM SERPL-MCNC: 9.1 MG/DL — SIGNIFICANT CHANGE UP (ref 8.4–10.5)
CHLORIDE SERPL-SCNC: 97 MMOL/L — LOW (ref 98–107)
CO2 SERPL-SCNC: 30 MMOL/L — SIGNIFICANT CHANGE UP (ref 22–31)
CREAT SERPL-MCNC: 1.36 MG/DL — HIGH (ref 0.5–1.3)
EGFR: 49 ML/MIN/1.73M2 — LOW
EOSINOPHIL # BLD AUTO: 0.38 K/UL — SIGNIFICANT CHANGE UP (ref 0–0.5)
EOSINOPHIL NFR BLD AUTO: 4.8 % — SIGNIFICANT CHANGE UP (ref 0–6)
GLUCOSE BLDC GLUCOMTR-MCNC: 143 MG/DL — HIGH (ref 70–99)
GLUCOSE BLDC GLUCOMTR-MCNC: 152 MG/DL — HIGH (ref 70–99)
GLUCOSE BLDC GLUCOMTR-MCNC: 176 MG/DL — HIGH (ref 70–99)
GLUCOSE BLDC GLUCOMTR-MCNC: 186 MG/DL — HIGH (ref 70–99)
GLUCOSE BLDC GLUCOMTR-MCNC: 212 MG/DL — HIGH (ref 70–99)
GLUCOSE SERPL-MCNC: 139 MG/DL — HIGH (ref 70–99)
HCT VFR BLD CALC: 27.2 % — LOW (ref 39–50)
HCT VFR BLD CALC: 28.5 % — LOW (ref 39–50)
HGB BLD-MCNC: 8.5 G/DL — LOW (ref 13–17)
HGB BLD-MCNC: 8.7 G/DL — LOW (ref 13–17)
IANC: 4.22 K/UL — SIGNIFICANT CHANGE UP (ref 1.8–7.4)
IMM GRANULOCYTES NFR BLD AUTO: 0.4 % — SIGNIFICANT CHANGE UP (ref 0–0.9)
LYMPHOCYTES # BLD AUTO: 2.44 K/UL — SIGNIFICANT CHANGE UP (ref 1–3.3)
LYMPHOCYTES # BLD AUTO: 31 % — SIGNIFICANT CHANGE UP (ref 13–44)
MAGNESIUM SERPL-MCNC: 2.3 MG/DL — SIGNIFICANT CHANGE UP (ref 1.6–2.6)
MCHC RBC-ENTMCNC: 29.2 PG — SIGNIFICANT CHANGE UP (ref 27–34)
MCHC RBC-ENTMCNC: 29.5 PG — SIGNIFICANT CHANGE UP (ref 27–34)
MCHC RBC-ENTMCNC: 30.5 GM/DL — LOW (ref 32–36)
MCHC RBC-ENTMCNC: 31.3 GM/DL — LOW (ref 32–36)
MCV RBC AUTO: 94.4 FL — SIGNIFICANT CHANGE UP (ref 80–100)
MCV RBC AUTO: 95.6 FL — SIGNIFICANT CHANGE UP (ref 80–100)
MONOCYTES # BLD AUTO: 0.72 K/UL — SIGNIFICANT CHANGE UP (ref 0–0.9)
MONOCYTES NFR BLD AUTO: 9.2 % — SIGNIFICANT CHANGE UP (ref 2–14)
NEUTROPHILS # BLD AUTO: 4.22 K/UL — SIGNIFICANT CHANGE UP (ref 1.8–7.4)
NEUTROPHILS NFR BLD AUTO: 53.7 % — SIGNIFICANT CHANGE UP (ref 43–77)
NRBC # BLD: 0 /100 WBCS — SIGNIFICANT CHANGE UP (ref 0–0)
NRBC # BLD: 0 /100 WBCS — SIGNIFICANT CHANGE UP (ref 0–0)
NRBC # FLD: 0 K/UL — SIGNIFICANT CHANGE UP (ref 0–0)
NRBC # FLD: 0 K/UL — SIGNIFICANT CHANGE UP (ref 0–0)
PHOSPHATE SERPL-MCNC: 3.9 MG/DL — SIGNIFICANT CHANGE UP (ref 2.5–4.5)
PLATELET # BLD AUTO: 383 K/UL — SIGNIFICANT CHANGE UP (ref 150–400)
PLATELET # BLD AUTO: 408 K/UL — HIGH (ref 150–400)
POTASSIUM SERPL-MCNC: 4.7 MMOL/L — SIGNIFICANT CHANGE UP (ref 3.5–5.3)
POTASSIUM SERPL-SCNC: 4.7 MMOL/L — SIGNIFICANT CHANGE UP (ref 3.5–5.3)
RBC # BLD: 2.88 M/UL — LOW (ref 4.2–5.8)
RBC # BLD: 2.98 M/UL — LOW (ref 4.2–5.8)
RBC # FLD: 16.3 % — HIGH (ref 10.3–14.5)
RBC # FLD: 16.4 % — HIGH (ref 10.3–14.5)
RH IG SCN BLD-IMP: POSITIVE — SIGNIFICANT CHANGE UP
SODIUM SERPL-SCNC: 139 MMOL/L — SIGNIFICANT CHANGE UP (ref 135–145)
WBC # BLD: 7.86 K/UL — SIGNIFICANT CHANGE UP (ref 3.8–10.5)
WBC # BLD: 8.66 K/UL — SIGNIFICANT CHANGE UP (ref 3.8–10.5)
WBC # FLD AUTO: 7.86 K/UL — SIGNIFICANT CHANGE UP (ref 3.8–10.5)
WBC # FLD AUTO: 8.66 K/UL — SIGNIFICANT CHANGE UP (ref 3.8–10.5)

## 2024-03-10 PROCEDURE — 74018 RADEX ABDOMEN 1 VIEW: CPT | Mod: 26

## 2024-03-10 RX ORDER — FUROSEMIDE 40 MG
20 TABLET ORAL ONCE
Refills: 0 | Status: COMPLETED | OUTPATIENT
Start: 2024-03-10 | End: 2024-03-10

## 2024-03-10 RX ADMIN — Medication 1 DROP(S): at 23:40

## 2024-03-10 RX ADMIN — CARVEDILOL PHOSPHATE 6.25 MILLIGRAM(S): 80 CAPSULE, EXTENDED RELEASE ORAL at 05:59

## 2024-03-10 RX ADMIN — ESCITALOPRAM OXALATE 10 MILLIGRAM(S): 10 TABLET, FILM COATED ORAL at 13:14

## 2024-03-10 RX ADMIN — CHLORHEXIDINE GLUCONATE 15 MILLILITER(S): 213 SOLUTION TOPICAL at 18:04

## 2024-03-10 RX ADMIN — PANTOPRAZOLE SODIUM 40 MILLIGRAM(S): 20 TABLET, DELAYED RELEASE ORAL at 18:04

## 2024-03-10 RX ADMIN — Medication 2: at 06:00

## 2024-03-10 RX ADMIN — Medication 2: at 23:31

## 2024-03-10 RX ADMIN — Medication 20 MILLIGRAM(S): at 05:59

## 2024-03-10 RX ADMIN — HEPARIN SODIUM 5000 UNIT(S): 5000 INJECTION INTRAVENOUS; SUBCUTANEOUS at 05:58

## 2024-03-10 RX ADMIN — HEPARIN SODIUM 5000 UNIT(S): 5000 INJECTION INTRAVENOUS; SUBCUTANEOUS at 13:14

## 2024-03-10 RX ADMIN — LEVETIRACETAM 500 MILLIGRAM(S): 250 TABLET, FILM COATED ORAL at 06:01

## 2024-03-10 RX ADMIN — Medication 1 DROP(S): at 18:02

## 2024-03-10 RX ADMIN — Medication 1 GRAM(S): at 06:02

## 2024-03-10 RX ADMIN — Medication 50 MILLIGRAM(S): at 21:08

## 2024-03-10 RX ADMIN — CHLORHEXIDINE GLUCONATE 15 MILLILITER(S): 213 SOLUTION TOPICAL at 05:58

## 2024-03-10 RX ADMIN — Medication 2: at 18:02

## 2024-03-10 RX ADMIN — INSULIN GLARGINE 12 UNIT(S): 100 INJECTION, SOLUTION SUBCUTANEOUS at 11:45

## 2024-03-10 RX ADMIN — Medication 50 MILLIGRAM(S): at 13:15

## 2024-03-10 RX ADMIN — Medication 20 MILLIGRAM(S): at 13:14

## 2024-03-10 RX ADMIN — Medication 3 MILLILITER(S): at 09:19

## 2024-03-10 RX ADMIN — PANTOPRAZOLE SODIUM 40 MILLIGRAM(S): 20 TABLET, DELAYED RELEASE ORAL at 06:01

## 2024-03-10 RX ADMIN — CHLORHEXIDINE GLUCONATE 1 APPLICATION(S): 213 SOLUTION TOPICAL at 11:46

## 2024-03-10 RX ADMIN — Medication 1 APPLICATION(S): at 21:08

## 2024-03-10 RX ADMIN — Medication 0.5 MILLIGRAM(S): at 10:37

## 2024-03-10 RX ADMIN — Medication 1 DROP(S): at 11:45

## 2024-03-10 RX ADMIN — POLYETHYLENE GLYCOL 3350 17 GRAM(S): 17 POWDER, FOR SOLUTION ORAL at 11:47

## 2024-03-10 RX ADMIN — Medication 6 MILLIGRAM(S): at 21:10

## 2024-03-10 RX ADMIN — LEVETIRACETAM 500 MILLIGRAM(S): 250 TABLET, FILM COATED ORAL at 18:04

## 2024-03-10 RX ADMIN — Medication 2 MILLIGRAM(S): at 21:08

## 2024-03-10 RX ADMIN — Medication 1 GRAM(S): at 18:03

## 2024-03-10 RX ADMIN — SENNA PLUS 10 MILLILITER(S): 8.6 TABLET ORAL at 21:09

## 2024-03-10 RX ADMIN — HEPARIN SODIUM 5000 UNIT(S): 5000 INJECTION INTRAVENOUS; SUBCUTANEOUS at 21:07

## 2024-03-10 RX ADMIN — TICAGRELOR 60 MILLIGRAM(S): 90 TABLET ORAL at 18:03

## 2024-03-10 RX ADMIN — Medication 81 MILLIGRAM(S): at 11:46

## 2024-03-10 RX ADMIN — Medication 50 MILLIGRAM(S): at 05:58

## 2024-03-10 RX ADMIN — Medication 4: at 11:45

## 2024-03-10 RX ADMIN — CARVEDILOL PHOSPHATE 6.25 MILLIGRAM(S): 80 CAPSULE, EXTENDED RELEASE ORAL at 18:02

## 2024-03-10 RX ADMIN — TICAGRELOR 60 MILLIGRAM(S): 90 TABLET ORAL at 06:00

## 2024-03-10 RX ADMIN — Medication 3 MILLILITER(S): at 21:06

## 2024-03-10 NOTE — PROGRESS NOTE ADULT - SUBJECTIVE AND OBJECTIVE BOX
CHIEF COMPLAINT:    INTERVAL EVENTS:     ROS: Seen by bedside during AM rounds     OBJECTIVE:  ICU Vital Signs Last 24 Hrs  T(C): 36.6 (10 Mar 2024 05:33), Max: 36.7 (10 Mar 2024 00:00)  T(F): 97.9 (10 Mar 2024 05:33), Max: 98 (10 Mar 2024 00:00)  HR: 88 (10 Mar 2024 06:17) (82 - 92)  BP: 114/52 (10 Mar 2024 05:33) (98/54 - 127/57)  BP(mean): 71 (09 Mar 2024 17:49) (71 - 73)  ABP: --  ABP(mean): --  RR: 18 (10 Mar 2024 05:33) (14 - 24)  SpO2: 97% (10 Mar 2024 06:17) (96% - 100%)    O2 Parameters below as of 10 Mar 2024 05:33  Patient On (Oxygen Delivery Method): ventilator, ac          Mode: AC/ CMV (Assist Control/ Continuous Mandatory Ventilation), RR (machine): 12, TV (machine): 450, FiO2: 30, PEEP: 5, ITime: 0.76, MAP: 12, PIP: 36    03-08 @ 07:01  -  03-09 @ 07:00  --------------------------------------------------------  IN: 720 mL / OUT: 610 mL / NET: 110 mL    03-09 @ 06:01  - 03-10 @ 06:57  --------------------------------------------------------  IN: 700 mL / OUT: 1700 mL / NET: -1000 mL      CAPILLARY BLOOD GLUCOSE      POCT Blood Glucose.: 143 mg/dL (10 Mar 2024 06:23)      PHYSICAL EXAM:  General:   HEENT:   Lymph Nodes:  Neck:   Respiratory:   Cardiovascular:   Abdomen:   Extremities:   Skin:   Neurological:  Psychiatry:    Mode: AC/ CMV (Assist Control/ Continuous Mandatory Ventilation)  RR (machine): 12  TV (machine): 450  FiO2: 30  PEEP: 5  ITime: 0.76  MAP: 12  PIP: 36      HOSPITAL MEDICATIONS:  MEDICATIONS  (STANDING):  albuterol/ipratropium for Nebulization 3 milliLiter(s) Nebulizer every 12 hours  artificial  tears Solution 1 Drop(s) Left EYE four times a day  aspirin  chewable 81 milliGRAM(s) Enteral Tube daily  carvedilol 6.25 milliGRAM(s) Oral every 12 hours  chlorhexidine 0.12% Liquid 15 milliLiter(s) Oral Mucosa every 12 hours  chlorhexidine 2% Cloths 1 Application(s) Topical daily  darbepoetin Injectable ViaL 60 MICROGram(s) SubCutaneous once  dextrose 5%. 1000 milliLiter(s) (100 mL/Hr) IV Continuous <Continuous>  dextrose 5%. 1000 milliLiter(s) (50 mL/Hr) IV Continuous <Continuous>  dextrose 50% Injectable 25 Gram(s) IV Push once  dextrose 50% Injectable 12.5 Gram(s) IV Push once  dextrose 50% Injectable 25 Gram(s) IV Push once  doxazosin 2 milliGRAM(s) Oral at bedtime  escitalopram Solution 10 milliGRAM(s) Oral daily  furosemide    Tablet 20 milliGRAM(s) Oral daily  glucagon  Injectable 1 milliGRAM(s) IntraMuscular once  heparin   Injectable 5000 Unit(s) SubCutaneous every 8 hours  hydrALAZINE 50 milliGRAM(s) Oral every 8 hours  insulin glargine Injectable (LANTUS) 12 Unit(s) SubCutaneous <User Schedule>  insulin lispro (ADMELOG) corrective regimen sliding scale   SubCutaneous every 6 hours  levETIRAcetam  Solution 500 milliGRAM(s) Oral two times a day  melatonin 6 milliGRAM(s) Oral at bedtime  pantoprazole   Suspension 40 milliGRAM(s) Oral every 12 hours  petrolatum Ophthalmic Ointment 1 Application(s) Left EYE at bedtime  polyethylene glycol 3350 17 Gram(s) Oral daily  senna Syrup 10 milliLiter(s) Oral at bedtime  sucralfate suspension 1 Gram(s) Enteral Tube two times a day  ticagrelor 60 milliGRAM(s) Oral every 12 hours    MEDICATIONS  (PRN):  acetaminophen   Oral Liquid .. 650 milliGRAM(s) Oral every 6 hours PRN Temp greater or equal to 38C (100.4F), Mild Pain (1 - 3), Moderate Pain (4 - 6)  dextrose Oral Gel 15 Gram(s) Oral once PRN Blood Glucose LESS THAN 70 milliGRAM(s)/deciliter  LORazepam     Tablet 0.5 milliGRAM(s) Oral every 6 hours PRN Agitation  tranexamic acid Injectable for Nebulization 500 milliGRAM(s) Inhalation every 8 hours PRN hemoptysis      LABS:                        8.7    8.66  )-----------( 408      ( 09 Mar 2024 20:20 )             28.5     03-09    140  |  99  |  38<H>  ----------------------------<  88  4.5   |  31  |  1.23    Ca    9.0      09 Mar 2024 05:25  Phos  3.0     03-09  Mg     2.20     03-09        Urinalysis Basic - ( 09 Mar 2024 05:25 )    Color: x / Appearance: x / SG: x / pH: x  Gluc: 88 mg/dL / Ketone: x  / Bili: x / Urobili: x   Blood: x / Protein: x / Nitrite: x   Leuk Esterase: x / RBC: x / WBC x   Sq Epi: x / Non Sq Epi: x / Bacteria: x         CHIEF COMPLAINT:    INTERVAL EVENTS:     ROS: Seen by bedside during AM rounds     OBJECTIVE:  ICU Vital Signs Last 24 Hrs  T(C): 36.6 (10 Mar 2024 05:33), Max: 36.7 (10 Mar 2024 00:00)  T(F): 97.9 (10 Mar 2024 05:33), Max: 98 (10 Mar 2024 00:00)  HR: 88 (10 Mar 2024 06:17) (82 - 92)  BP: 114/52 (10 Mar 2024 05:33) (98/54 - 127/57)  BP(mean): 71 (09 Mar 2024 17:49) (71 - 73)  ABP: --  ABP(mean): --  RR: 18 (10 Mar 2024 05:33) (14 - 24)  SpO2: 97% (10 Mar 2024 06:17) (96% - 100%)    O2 Parameters below as of 10 Mar 2024 05:33  Patient On (Oxygen Delivery Method): ventilator, ac          Mode: AC/ CMV (Assist Control/ Continuous Mandatory Ventilation), RR (machine): 12, TV (machine): 450, FiO2: 30, PEEP: 5, ITime: 0.76, MAP: 12, PIP: 36    03-08 @ 07:01  -  03-09 @ 07:00  --------------------------------------------------------  IN: 720 mL / OUT: 610 mL / NET: 110 mL    03-09 @ 06:01  -  03-10 @ 06:57  --------------------------------------------------------  IN: 700 mL / OUT: 1700 mL / NET: -1000 mL      CAPILLARY BLOOD GLUCOSE      POCT Blood Glucose.: 143 mg/dL (10 Mar 2024 06:23)      PHYSICAL EXAM:  General: NAD   Neck: (+) Trach tube noted, site c/d/i.   Cards: S1/S2, no murmurs   Pulm: CTA b/l. No resp distress.   Abdomen: Soft, NTND. (+) PEG noted, site c/d/i. (+)Biliary drain in place, draining bilious fluid.   Extremities: 1-2+ b/l LE pitting edema R>L.  Neurology: awake/eyes open/alert. Tracks at times. Nonverbal. Follows some commands when spoken to in native language by son at bedside (wiggle toes).   Skin: warm to touch, color appropriate for ethnicity. Refer to RN assessment for further details.    Mode: AC/ CMV (Assist Control/ Continuous Mandatory Ventilation)  RR (machine): 12  TV (machine): 450  FiO2: 30  PEEP: 5  ITime: 0.76  MAP: 12  PIP: 36      HOSPITAL MEDICATIONS:  MEDICATIONS  (STANDING):  albuterol/ipratropium for Nebulization 3 milliLiter(s) Nebulizer every 12 hours  artificial  tears Solution 1 Drop(s) Left EYE four times a day  aspirin  chewable 81 milliGRAM(s) Enteral Tube daily  carvedilol 6.25 milliGRAM(s) Oral every 12 hours  chlorhexidine 0.12% Liquid 15 milliLiter(s) Oral Mucosa every 12 hours  chlorhexidine 2% Cloths 1 Application(s) Topical daily  darbepoetin Injectable ViaL 60 MICROGram(s) SubCutaneous once  dextrose 5%. 1000 milliLiter(s) (100 mL/Hr) IV Continuous <Continuous>  dextrose 5%. 1000 milliLiter(s) (50 mL/Hr) IV Continuous <Continuous>  dextrose 50% Injectable 25 Gram(s) IV Push once  dextrose 50% Injectable 12.5 Gram(s) IV Push once  dextrose 50% Injectable 25 Gram(s) IV Push once  doxazosin 2 milliGRAM(s) Oral at bedtime  escitalopram Solution 10 milliGRAM(s) Oral daily  furosemide    Tablet 20 milliGRAM(s) Oral daily  glucagon  Injectable 1 milliGRAM(s) IntraMuscular once  heparin   Injectable 5000 Unit(s) SubCutaneous every 8 hours  hydrALAZINE 50 milliGRAM(s) Oral every 8 hours  insulin glargine Injectable (LANTUS) 12 Unit(s) SubCutaneous <User Schedule>  insulin lispro (ADMELOG) corrective regimen sliding scale   SubCutaneous every 6 hours  levETIRAcetam  Solution 500 milliGRAM(s) Oral two times a day  melatonin 6 milliGRAM(s) Oral at bedtime  pantoprazole   Suspension 40 milliGRAM(s) Oral every 12 hours  petrolatum Ophthalmic Ointment 1 Application(s) Left EYE at bedtime  polyethylene glycol 3350 17 Gram(s) Oral daily  senna Syrup 10 milliLiter(s) Oral at bedtime  sucralfate suspension 1 Gram(s) Enteral Tube two times a day  ticagrelor 60 milliGRAM(s) Oral every 12 hours    MEDICATIONS  (PRN):  acetaminophen   Oral Liquid .. 650 milliGRAM(s) Oral every 6 hours PRN Temp greater or equal to 38C (100.4F), Mild Pain (1 - 3), Moderate Pain (4 - 6)  dextrose Oral Gel 15 Gram(s) Oral once PRN Blood Glucose LESS THAN 70 milliGRAM(s)/deciliter  LORazepam     Tablet 0.5 milliGRAM(s) Oral every 6 hours PRN Agitation  tranexamic acid Injectable for Nebulization 500 milliGRAM(s) Inhalation every 8 hours PRN hemoptysis      LABS:                        8.7    8.66  )-----------( 408      ( 09 Mar 2024 20:20 )             28.5     03-09    140  |  99  |  38<H>  ----------------------------<  88  4.5   |  31  |  1.23    Ca    9.0      09 Mar 2024 05:25  Phos  3.0     03-09  Mg     2.20     03-09        Urinalysis Basic - ( 09 Mar 2024 05:25 )    Color: x / Appearance: x / SG: x / pH: x  Gluc: 88 mg/dL / Ketone: x  / Bili: x / Urobili: x   Blood: x / Protein: x / Nitrite: x   Leuk Esterase: x / RBC: x / WBC x   Sq Epi: x / Non Sq Epi: x / Bacteria: x         CHIEF COMPLAINT:    INTERVAL EVENTS:     ROS: Seen by bedside during AM rounds     OBJECTIVE:  ICU Vital Signs Last 24 Hrs  T(C): 36.6 (10 Mar 2024 05:33), Max: 36.7 (10 Mar 2024 00:00)  T(F): 97.9 (10 Mar 2024 05:33), Max: 98 (10 Mar 2024 00:00)  HR: 88 (10 Mar 2024 06:17) (82 - 92)  BP: 114/52 (10 Mar 2024 05:33) (98/54 - 127/57)  BP(mean): 71 (09 Mar 2024 17:49) (71 - 73)  ABP: --  ABP(mean): --  RR: 18 (10 Mar 2024 05:33) (14 - 24)  SpO2: 97% (10 Mar 2024 06:17) (96% - 100%)    O2 Parameters below as of 10 Mar 2024 05:33  Patient On (Oxygen Delivery Method): ventilator, ac          Mode: AC/ CMV (Assist Control/ Continuous Mandatory Ventilation), RR (machine): 12, TV (machine): 450, FiO2: 30, PEEP: 5, ITime: 0.76, MAP: 12, PIP: 36    03-08 @ 07:01  -  03-09 @ 07:00  --------------------------------------------------------  IN: 720 mL / OUT: 610 mL / NET: 110 mL    03-09 @ 06:01  -  03-10 @ 06:57  --------------------------------------------------------  IN: 700 mL / OUT: 1700 mL / NET: -1000 mL      CAPILLARY BLOOD GLUCOSE      POCT Blood Glucose.: 143 mg/dL (10 Mar 2024 06:23)      PHYSICAL EXAM:  General: NAD   Neck: (+) Trach tube noted, site c/d/i.   Cards: S1/S2, no murmurs   Pulm: Mildly rhonchorous b/l. Trach tube suctioned with no return of blood noted. No resp distress.   Abdomen: Soft, NTND. (+) PEG noted, site c/d/i. (+)Biliary drain in place, draining bilious fluid.   Extremities: 1-2+ b/l LE pitting edema R>L.  Neurology: awake/eyes open/alert. Tracks at times. Nonverbal. Follows some commands when spoken to in native language by son at bedside (wiggle toes).   Skin: warm to touch, color appropriate for ethnicity. Refer to RN assessment for further details.    Mode: AC/ CMV (Assist Control/ Continuous Mandatory Ventilation)  RR (machine): 12  TV (machine): 450  FiO2: 30  PEEP: 5  ITime: 0.76  MAP: 12  PIP: 36      HOSPITAL MEDICATIONS:  MEDICATIONS  (STANDING):  albuterol/ipratropium for Nebulization 3 milliLiter(s) Nebulizer every 12 hours  artificial  tears Solution 1 Drop(s) Left EYE four times a day  aspirin  chewable 81 milliGRAM(s) Enteral Tube daily  carvedilol 6.25 milliGRAM(s) Oral every 12 hours  chlorhexidine 0.12% Liquid 15 milliLiter(s) Oral Mucosa every 12 hours  chlorhexidine 2% Cloths 1 Application(s) Topical daily  darbepoetin Injectable ViaL 60 MICROGram(s) SubCutaneous once  dextrose 5%. 1000 milliLiter(s) (100 mL/Hr) IV Continuous <Continuous>  dextrose 5%. 1000 milliLiter(s) (50 mL/Hr) IV Continuous <Continuous>  dextrose 50% Injectable 25 Gram(s) IV Push once  dextrose 50% Injectable 12.5 Gram(s) IV Push once  dextrose 50% Injectable 25 Gram(s) IV Push once  doxazosin 2 milliGRAM(s) Oral at bedtime  escitalopram Solution 10 milliGRAM(s) Oral daily  furosemide    Tablet 20 milliGRAM(s) Oral daily  glucagon  Injectable 1 milliGRAM(s) IntraMuscular once  heparin   Injectable 5000 Unit(s) SubCutaneous every 8 hours  hydrALAZINE 50 milliGRAM(s) Oral every 8 hours  insulin glargine Injectable (LANTUS) 12 Unit(s) SubCutaneous <User Schedule>  insulin lispro (ADMELOG) corrective regimen sliding scale   SubCutaneous every 6 hours  levETIRAcetam  Solution 500 milliGRAM(s) Oral two times a day  melatonin 6 milliGRAM(s) Oral at bedtime  pantoprazole   Suspension 40 milliGRAM(s) Oral every 12 hours  petrolatum Ophthalmic Ointment 1 Application(s) Left EYE at bedtime  polyethylene glycol 3350 17 Gram(s) Oral daily  senna Syrup 10 milliLiter(s) Oral at bedtime  sucralfate suspension 1 Gram(s) Enteral Tube two times a day  ticagrelor 60 milliGRAM(s) Oral every 12 hours    MEDICATIONS  (PRN):  acetaminophen   Oral Liquid .. 650 milliGRAM(s) Oral every 6 hours PRN Temp greater or equal to 38C (100.4F), Mild Pain (1 - 3), Moderate Pain (4 - 6)  dextrose Oral Gel 15 Gram(s) Oral once PRN Blood Glucose LESS THAN 70 milliGRAM(s)/deciliter  LORazepam     Tablet 0.5 milliGRAM(s) Oral every 6 hours PRN Agitation  tranexamic acid Injectable for Nebulization 500 milliGRAM(s) Inhalation every 8 hours PRN hemoptysis      LABS:                        8.7    8.66  )-----------( 408      ( 09 Mar 2024 20:20 )             28.5     03-09    140  |  99  |  38<H>  ----------------------------<  88  4.5   |  31  |  1.23    Ca    9.0      09 Mar 2024 05:25  Phos  3.0     03-09  Mg     2.20     03-09        Urinalysis Basic - ( 09 Mar 2024 05:25 )    Color: x / Appearance: x / SG: x / pH: x  Gluc: 88 mg/dL / Ketone: x  / Bili: x / Urobili: x   Blood: x / Protein: x / Nitrite: x   Leuk Esterase: x / RBC: x / WBC x   Sq Epi: x / Non Sq Epi: x / Bacteria: x         CHIEF COMPLAINT:Patient is a 89y old  Male who presents with a chief complaint of COVID19, Chest pain (10 Mar 2024 12:26)      INTERVAL EVENTS: no acute overnight events.  CXR wiht increased haziness, will give additional Lasix 20 x1 today.    ROS: Seen by bedside during AM rounds     OBJECTIVE:  ICU Vital Signs Last 24 Hrs  T(C): 36.6 (10 Mar 2024 05:33), Max: 36.7 (10 Mar 2024 00:00)  T(F): 97.9 (10 Mar 2024 05:33), Max: 98 (10 Mar 2024 00:00)  HR: 88 (10 Mar 2024 06:17) (82 - 92)  BP: 114/52 (10 Mar 2024 05:33) (98/54 - 127/57)  BP(mean): 71 (09 Mar 2024 17:49) (71 - 73)  ABP: --  ABP(mean): --  RR: 18 (10 Mar 2024 05:33) (14 - 24)  SpO2: 97% (10 Mar 2024 06:17) (96% - 100%)    O2 Parameters below as of 10 Mar 2024 05:33  Patient On (Oxygen Delivery Method): ventilator, ac          Mode: AC/ CMV (Assist Control/ Continuous Mandatory Ventilation), RR (machine): 12, TV (machine): 450, FiO2: 30, PEEP: 5, ITime: 0.76, MAP: 12, PIP: 36    03-08 @ 07:01  -  03-09 @ 07:00  --------------------------------------------------------  IN: 720 mL / OUT: 610 mL / NET: 110 mL    03-09 @ 06:01  -  03-10 @ 06:57  --------------------------------------------------------  IN: 700 mL / OUT: 1700 mL / NET: -1000 mL      CAPILLARY BLOOD GLUCOSE      POCT Blood Glucose.: 143 mg/dL (10 Mar 2024 06:23)      PHYSICAL EXAM:  General: NAD   Neck: (+) Trach tube noted, site c/d/i.   Cards: S1/S2, no murmurs   Pulm: Mildly rhonchorous b/l. Trach tube suctioned with no return of blood noted. No resp distress.   Abdomen: Soft, NTND. (+) PEG noted, site c/d/i. (+)Biliary drain in place, draining bilious fluid.   Extremities: 1-2+ b/l LE pitting edema R>L.  Neurology: awake/eyes open/alert. Tracks at times. Nonverbal. Follows some commands when spoken to in native language by son at bedside (wiggle toes).   Skin: warm to touch, color appropriate for ethnicity. Refer to RN assessment for further details.    Mode: AC/ CMV (Assist Control/ Continuous Mandatory Ventilation)  RR (machine): 12  TV (machine): 450  FiO2: 30  PEEP: 5  ITime: 0.76  MAP: 12  PIP: 36      HOSPITAL MEDICATIONS:  MEDICATIONS  (STANDING):  albuterol/ipratropium for Nebulization 3 milliLiter(s) Nebulizer every 12 hours  artificial  tears Solution 1 Drop(s) Left EYE four times a day  aspirin  chewable 81 milliGRAM(s) Enteral Tube daily  carvedilol 6.25 milliGRAM(s) Oral every 12 hours  chlorhexidine 0.12% Liquid 15 milliLiter(s) Oral Mucosa every 12 hours  chlorhexidine 2% Cloths 1 Application(s) Topical daily  darbepoetin Injectable ViaL 60 MICROGram(s) SubCutaneous once  dextrose 5%. 1000 milliLiter(s) (100 mL/Hr) IV Continuous <Continuous>  dextrose 5%. 1000 milliLiter(s) (50 mL/Hr) IV Continuous <Continuous>  dextrose 50% Injectable 25 Gram(s) IV Push once  dextrose 50% Injectable 12.5 Gram(s) IV Push once  dextrose 50% Injectable 25 Gram(s) IV Push once  doxazosin 2 milliGRAM(s) Oral at bedtime  escitalopram Solution 10 milliGRAM(s) Oral daily  furosemide    Tablet 20 milliGRAM(s) Oral daily  glucagon  Injectable 1 milliGRAM(s) IntraMuscular once  heparin   Injectable 5000 Unit(s) SubCutaneous every 8 hours  hydrALAZINE 50 milliGRAM(s) Oral every 8 hours  insulin glargine Injectable (LANTUS) 12 Unit(s) SubCutaneous <User Schedule>  insulin lispro (ADMELOG) corrective regimen sliding scale   SubCutaneous every 6 hours  levETIRAcetam  Solution 500 milliGRAM(s) Oral two times a day  melatonin 6 milliGRAM(s) Oral at bedtime  pantoprazole   Suspension 40 milliGRAM(s) Oral every 12 hours  petrolatum Ophthalmic Ointment 1 Application(s) Left EYE at bedtime  polyethylene glycol 3350 17 Gram(s) Oral daily  senna Syrup 10 milliLiter(s) Oral at bedtime  sucralfate suspension 1 Gram(s) Enteral Tube two times a day  ticagrelor 60 milliGRAM(s) Oral every 12 hours    MEDICATIONS  (PRN):  acetaminophen   Oral Liquid .. 650 milliGRAM(s) Oral every 6 hours PRN Temp greater or equal to 38C (100.4F), Mild Pain (1 - 3), Moderate Pain (4 - 6)  dextrose Oral Gel 15 Gram(s) Oral once PRN Blood Glucose LESS THAN 70 milliGRAM(s)/deciliter  LORazepam     Tablet 0.5 milliGRAM(s) Oral every 6 hours PRN Agitation  tranexamic acid Injectable for Nebulization 500 milliGRAM(s) Inhalation every 8 hours PRN hemoptysis      LABS:                        8.7    8.66  )-----------( 408      ( 09 Mar 2024 20:20 )             28.5     03-09    140  |  99  |  38<H>  ----------------------------<  88  4.5   |  31  |  1.23    Ca    9.0      09 Mar 2024 05:25  Phos  3.0     03-09  Mg     2.20     03-09        Urinalysis Basic - ( 09 Mar 2024 05:25 )    Color: x / Appearance: x / SG: x / pH: x  Gluc: 88 mg/dL / Ketone: x  / Bili: x / Urobili: x   Blood: x / Protein: x / Nitrite: x   Leuk Esterase: x / RBC: x / WBC x   Sq Epi: x / Non Sq Epi: x / Bacteria: x

## 2024-03-10 NOTE — PROGRESS NOTE ADULT - NS ATTEND AMEND GEN_ALL_CORE FT
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 Transferred to [facility] on 2/16.     - continue with daily SBT, transitioned back to CMV yesterday for tachypnea  - mild hemoptysis yesterday, likely secondary to tracheal irritation/suction trauma. s/p TXA with improvement. Hgb stable.  - cardiac GDMT, CXR with concern for increased edema. will dose additional lasix this afternoon   - last abx 3/6 (ertapenem 2/29-3/6 for ESBL kleb pna) without further fevers

## 2024-03-10 NOTE — PROGRESS NOTE ADULT - ASSESSMENT
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 Course ultimately complicated by progressive WOB and O2 demand and patient intubated and transferred to MICU (1/22). Course further complicated by acute cholecystitis and s/p Perc Lynsey with IR on (1/26), HFrEF 38, pulmonary edema, superimposed PNA, failed extubation failed and prolonged vent time and s/p trach (2/13). Patient transferred to RCU (2/16) and s/p PEG (2/20). Course complicated by volume overload and diuresis and GDMT continued and HFrEF 45 with improvement    NEUROLOGY   # Encephalopathy   - Hx of CVA with no residual deficits per family, however per family worsening confusion at home over the past 6 months (becoming disoriented to time) but prior to admission has been more confused, talking about seeing people that are not present.  - CTH (1/31) with no evidence of acute infarct  - Continue on ASA and Brilinta  - Holding Neurontin, Memantine and Oxycodone in setting of somnolence    # ICA stenosis   - CTA NECK (1/31) with moderate to severe narrowing left internal carotid at the origin. Severe narrowing right internal carotid by a tandem lesion 1.5 cm above the bifurcation with a narrowed internal carotid beyond the lesion. Extensive calcified plaque both cavernous and supraclinoid carotid arteries with narrowing but no occlusion. Mild narrowing proximal right M1 segment. Moderate narrowing both vertebral V4 segments. No perfusion abnormality at the Tmax 6 second threshold, but moderate hypoperfusion in the right MCA territory at the 4 second threshold.   - Continue on ASA and Brilinta    # Myoclonic jerks   - Hx of myoclonic jerks post CVAs   - EEG (1/18-2/6) negative for seizures  - Continue on Keppra and per neuro wishes to wean, however family wishes to keep current dose as home medication.     # Depression/ Insomnia  - Continue on Lexapro per home medication   - Course complicated by agitation and pulling on tubes   - Continue on Seroquel but QTC prolonged and now held   - Continue on Ativan 0.5mg PO Q6H PRN  - Supportive care     CARDIOLOGY   # Vasoplegic vs septic shock  # Hx of HTN   - Weaned off pressors in ICU and now with mild hypertension   - Continue on coreg and hydral as below   - Strict BP control given moderate AS    # AFIB RVR   # Bifascicular Block with Medtronic PPM   - AFIB RVR episodes and PPM interrogated (2/20) by EP with similar episodes  - Previous admission in 6/2022 with cardiogenic syncope second to bifasicular block, however now with PPM and AV myron blockers ok.   - Continue on lopressor 12.5mg BID however replaced with coreg second to HFrEF   - AC discussed at length however with recent GIB will hold for now     # HFrEF 38 likely stress induced?   # Mild to moderate AS   - ECHO (12/2023) with EF 62 with normal LVSF and mild to moderate AS   - ECHO (2/2024) with EF 38, moderate LVSD with global LV hypokinesis, mildly reduced RVSF (TAPSE 1.3), mild to moderate MR, mild AR, and moderate AS.   - RPT ECHO (3/4) with EF 45 and mild to moderate LVSD   - Continue on lasix 20 QD, coreg 6.25 and hydralazine 50 (increased 3/4)   - Hold isordil and up titrate above medications first     # CAD with CABG   - CATH (5/2022) with dLAD 70, SVG graft to OM1 with 90 in stent stenosis s/p PCI and GEMMA placement, and LIMA graft to mLAD with no disease.   - Continue on ASA and brilinta    # Prolonged QTC   - ECG (3/2) with QTC 616ms (corrected using bazzet 490ms)   - ECG (3/3) with QTC 634ms (corrected using bazzet 490ms)   - Monitor off/ avoid QTC prolonging agents for now    RESPIRATORY   # Acute respiratory failure second to SARS-COV-2 vs pulm edema vs PNA  - Presented with COVID complicated by sepsis second to acute lynsey and worsening respiratory failure second to pulmonary edema requiring intubation.   - Prolonged intubation and s/p tracheostomy (2/13)   - CT CHEST 1/16 with new small bilateral pleural effusions/ atelectasis/ patchy bilateral ground-glass opacities are consistent with pulmonary edema.  - Completed ABX and COVID regimen as below   - Continue on vent and initially tolerating some SBT 10/5   - Continue on nebs and hold further HTS given intermittent hemoptysis  - Continue on diuresis as above    # Mild hemoptysis likely from suction trauma   - s/p bronch (2/18) with no active bleed  - Hemoptysis improved with TXA and holding further HTS as above   - Tiny hemoptysis second to suction trauma imporving  - Careful with suctioning   - Recurrent hemoptysis (3/9) so will trial TXA nebs again     HEENT   # L eye subconjunctival hemorrhage   - Continue on artifical tears and Lacrilube   - Optho eval appreciated     GI  # Nutrition/ Dysphagia   - PEG placed by GI on 2/20 and tube feeds continued.   - Loose stools and Banatrol continued     # UGIB   - EGD (1/2) with esophagitis and bleeding Dieulafoy's lesion s/p clips.   - Continue on PPI and carafate     # SMA calcification   - CTA demonstrating mesenteric fat stranding associated with ascending/transverse colon  - Diagnostic laparoscopy (12/24) with small bowel and visible colon viable, some inflammation of omentum in RUQ  - SMA Angiogram and stent placed (12/25).   - Continue on ASA and Brilinta     # Acute Cholecystitis   - CT (12/26) with distended GB with cholelithiasis and sludge   - HIDA (12/29) POSITIVE for acute cholecystitis   - Case discussed with IR at length and s/p PERC LYNSEY (1/26)   - Completed zosyn course (12/24-12/31)   - PERC LYNSEY tube to stay in until surgical candidate for cholecystectomy to prevent recurrence     / RENAL   # CKD   - CRE at baseline   - Monitor renal function and UOP   - Aranesp SQ x1 (3/8)    # Hypernatremia   -  Q4H added however sodium downtrending and FWF dc'ed 3/4   - Monitor closely with diuresis as above   -  BID restarted per Renal (3/8)    INFECTIOUS DISEASE   # COVID with superimposed PNA   # ESBL Kleb PNA   - COVID POSITIVE (12/23) and completed remdesivir x 1 dose and paxlovid course.   - WOB worsened as above requiring intubation and cultures negative. Zosyn completed empirically however hypothermic (2/11) and BCx, UA, RVP and BAL negative.   - Completed additional zosyn (2/12-2/19) empirically   - Fever spike and thick secretions and SCX (2/26) with ESBL klebs.   - s/p Zosyn (2/27 - 2/29) but resistant and completed Erta (2/29 - 3/6)     HEME  # AOCD   - Monitor HH   - DVT PPX with HSQ     ENDOCRINE   # DM2 A1C 9.3   - Continue on Lantus 12 and ISS     SKIN/ TUBES   - Trach (2/13)   - PEG (2/20)   - PERC LYNSEY (1/26 - )     ETHICS   - FULL CODE     DISPO - vent facility and family aware. SW with accepting facility however pending available bed.  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 Course ultimately complicated by progressive WOB and O2 demand and patient intubated and transferred to MICU (1/22). Course further complicated by acute cholecystitis and s/p Perc Lynsey with IR on (1/26), HFrEF 38, pulmonary edema, superimposed PNA, failed extubation failed and prolonged vent time and s/p trach (2/13). Patient transferred to RCU (2/16) and s/p PEG (2/20). Course complicated by volume overload and diuresis and GDMT continued and HFrEF 45 with improvement    NEUROLOGY   # Encephalopathy   - Hx of CVA with no residual deficits per family, however per family worsening confusion at home over the past 6 months (becoming disoriented to time) but prior to admission has been more confused, talking about seeing people that are not present.  - CTH (1/31) with no evidence of acute infarct  - Continue on ASA and Brilinta  - Holding Neurontin, Memantine and Oxycodone in setting of somnolence    # ICA stenosis   - CTA NECK (1/31) with moderate to severe narrowing left internal carotid at the origin. Severe narrowing right internal carotid by a tandem lesion 1.5 cm above the bifurcation with a narrowed internal carotid beyond the lesion. Extensive calcified plaque both cavernous and supraclinoid carotid arteries with narrowing but no occlusion. Mild narrowing proximal right M1 segment. Moderate narrowing both vertebral V4 segments. No perfusion abnormality at the Tmax 6 second threshold, but moderate hypoperfusion in the right MCA territory at the 4 second threshold.   - Continue on ASA and Brilinta    # Myoclonic jerks   - Hx of myoclonic jerks post CVAs   - EEG (1/18-2/6) negative for seizures  - Continue on Keppra and per neuro wishes to wean, however family wishes to keep current dose as home medication.     # Depression/ Insomnia  - Continue on Lexapro per home medication   - Course complicated by agitation and pulling on tubes   - Continue on Seroquel but QTC prolonged and now held   - Continue on Ativan 0.5mg PO Q6H PRN  - Supportive care     CARDIOLOGY   # Vasoplegic vs septic shock  # Hx of HTN   - Weaned off pressors in ICU and now with mild hypertension   - Continue on coreg and hydral as below   - Strict BP control given moderate AS  - Increased vascular congestion on CXR (3/9) so will give additional Lasix 20mg PO x1 (3/10)    # AFIB RVR   # Bifascicular Block with Knoveltronic PPM   - AFIB RVR episodes and PPM interrogated (2/20) by EP with similar episodes  - Previous admission in 6/2022 with cardiogenic syncope second to bifasicular block, however now with PPM and AV myron blockers ok.   - Continue on lopressor 12.5mg BID however replaced with coreg second to HFrEF   - AC discussed at length however with recent GIB will hold for now     # HFrEF 38 likely stress induced?   # Mild to moderate AS   - ECHO (12/2023) with EF 62 with normal LVSF and mild to moderate AS   - ECHO (2/2024) with EF 38, moderate LVSD with global LV hypokinesis, mildly reduced RVSF (TAPSE 1.3), mild to moderate MR, mild AR, and moderate AS.   - RPT ECHO (3/4) with EF 45 and mild to moderate LVSD   - Continue on lasix 20 QD, coreg 6.25 and hydralazine 50 (increased 3/4)   - Hold isordil and up titrate above medications first     # CAD with CABG   - CATH (5/2022) with dLAD 70, SVG graft to OM1 with 90 in stent stenosis s/p PCI and GEMMA placement, and LIMA graft to mLAD with no disease.   - Continue on ASA and brilinta    # Prolonged QTC   - ECG (3/2) with QTC 616ms (corrected using bazzet 490ms)   - ECG (3/3) with QTC 634ms (corrected using bazzet 490ms)   - Monitor off/ avoid QTC prolonging agents for now    RESPIRATORY   # Acute respiratory failure second to SARS-COV-2 vs pulm edema vs PNA  - Presented with COVID complicated by sepsis second to acute lynsey and worsening respiratory failure second to pulmonary edema requiring intubation.   - Prolonged intubation and s/p tracheostomy (2/13)   - CT CHEST 1/16 with new small bilateral pleural effusions/ atelectasis/ patchy bilateral ground-glass opacities are consistent with pulmonary edema.  - Completed ABX and COVID regimen as below   - Continue on vent and initially tolerating some SBT 10/5   - Continue on nebs and hold further HTS given intermittent hemoptysis  - Continue on diuresis as above    # Mild hemoptysis likely from suction trauma   - s/p bronch (2/18) with no active bleed  - Hemoptysis improved with TXA and holding further HTS as above   - Tiny hemoptysis second to suction trauma imporving  - Careful with suctioning   - Recurrent hemoptysis (3/9) so ordered for TXA nebs again however hemoptysis appeared self limited and stopped before nebs even given    HEENT   # L eye subconjunctival hemorrhage   - Continue on artifical tears and Lacrilube   - Optho eval appreciated     GI  # Nutrition/ Dysphagia   - PEG placed by GI on 2/20 and tube feeds continued.   - Loose stools and Banatrol continued     # UGIB   - EGD (1/2) with esophagitis and bleeding Dieulafoy's lesion s/p clips.   - Continue on PPI and carafate     # SMA calcification   - CTA demonstrating mesenteric fat stranding associated with ascending/transverse colon  - Diagnostic laparoscopy (12/24) with small bowel and visible colon viable, some inflammation of omentum in RUQ  - SMA Angiogram and stent placed (12/25).   - Continue on ASA and Brilinta     # Acute Cholecystitis   - CT (12/26) with distended GB with cholelithiasis and sludge   - HIDA (12/29) POSITIVE for acute cholecystitis   - Case discussed with IR at length and s/p PERC LYNSEY (1/26)   - Completed zosyn course (12/24-12/31)   - PERC LYNSEY tube to stay in until surgical candidate for cholecystectomy to prevent recurrence     / RENAL   # CKD   - CRE at baseline   - Monitor renal function and UOP   - Aranesp SQ x1 (3/8)    # Hypernatremia   -  Q4H added however sodium downtrending and FWF dc'ed 3/4   - Monitor closely with diuresis as above   -  BID restarted per Renal (3/8)    INFECTIOUS DISEASE   # COVID with superimposed PNA   # ESBL Kleb PNA   - COVID POSITIVE (12/23) and completed remdesivir x 1 dose and paxlovid course.   - WOB worsened as above requiring intubation and cultures negative. Zosyn completed empirically however hypothermic (2/11) and BCx, UA, RVP and BAL negative.   - Completed additional zosyn (2/12-2/19) empirically   - Fever spike and thick secretions and SCX (2/26) with ESBL klebs.   - s/p Zosyn (2/27 - 2/29) but resistant and completed Erta (2/29 - 3/6)     HEME  # AOCD   - Monitor HH   - DVT PPX with HSQ     ENDOCRINE   # DM2 A1C 9.3   - Continue on Lantus 12 and ISS     SKIN/ TUBES   - Trach (2/13)   - PEG (2/20)   - PERC LYNSEY (1/26 - )     ETHICS   - FULL CODE     DISPO - vent facility and family aware. SW with accepting facility however pending available bed.

## 2024-03-10 NOTE — PROGRESS NOTE ADULT - SUBJECTIVE AND OBJECTIVE BOX
Aashish Boyer MD  Interventional Cardiology / Advance Heart Failure and Cardiac Transplant Specialist  [place] Office : [phone] [place] [address] [place] [address]  Tel:   [place] Office : [phone] [facility] [place] [address]  Tel: [phone]       Pt is lying in bed in RCU not in distress  	  MEDICATIONS:  aspirin  chewable 81 milliGRAM(s) Enteral Tube daily  carvedilol 6.25 milliGRAM(s) Oral every 12 hours  doxazosin 2 milliGRAM(s) Oral at bedtime  furosemide    Tablet 20 milliGRAM(s) Oral daily  furosemide    Tablet 20 milliGRAM(s) Oral once  heparin   Injectable 5000 Unit(s) SubCutaneous every 8 hours  hydrALAZINE 50 milliGRAM(s) Oral every 8 hours  ticagrelor 60 milliGRAM(s) Oral every 12 hours  tranexamic acid Injectable for Nebulization 500 milliGRAM(s) Inhalation every 8 hours PRN      albuterol/ipratropium for Nebulization 3 milliLiter(s) Nebulizer every 12 hours    acetaminophen   Oral Liquid .. 650 milliGRAM(s) Oral every 6 hours PRN  escitalopram Solution 10 milliGRAM(s) Oral daily  levETIRAcetam  Solution 500 milliGRAM(s) Oral two times a day  LORazepam     Tablet 0.5 milliGRAM(s) Oral every 6 hours PRN  melatonin 6 milliGRAM(s) Oral at bedtime    pantoprazole   Suspension 40 milliGRAM(s) Oral every 12 hours  polyethylene glycol 3350 17 Gram(s) Oral daily  senna Syrup 10 milliLiter(s) Oral at bedtime  sucralfate suspension 1 Gram(s) Enteral Tube two times a day    dextrose 50% Injectable 25 Gram(s) IV Push once  dextrose 50% Injectable 12.5 Gram(s) IV Push once  dextrose 50% Injectable 25 Gram(s) IV Push once  dextrose Oral Gel 15 Gram(s) Oral once PRN  glucagon  Injectable 1 milliGRAM(s) IntraMuscular once  insulin glargine Injectable (LANTUS) 12 Unit(s) SubCutaneous <User Schedule>  insulin lispro (ADMELOG) corrective regimen sliding scale   SubCutaneous every 6 hours    artificial  tears Solution 1 Drop(s) Left EYE four times a day  chlorhexidine 0.12% Liquid 15 milliLiter(s) Oral Mucosa every 12 hours  chlorhexidine 2% Cloths 1 Application(s) Topical daily  darbepoetin Injectable ViaL 60 MICROGram(s) SubCutaneous once  dextrose 5%. 1000 milliLiter(s) IV Continuous <Continuous>  dextrose 5%. 1000 milliLiter(s) IV Continuous <Continuous>  petrolatum Ophthalmic Ointment 1 Application(s) Left EYE at bedtime      PAST MEDICAL/SURGICAL HISTORY  PAST MEDICAL & SURGICAL HISTORY:  Hyperlipemia      Hypertension      Coronary Artery Disease      Diabetes Mellitus Type II      Stented Coronary Artery  total 5 stents, last stent 5/2019      Neuropathy      Myocardial infarction      Stroke  mild left facial numbness   no other residuals verbalized      Myoclonic jerking      Stage 3 chronic kidney disease      History of Cataract Extraction      Hx of CABG      H/O coronary angiogram      S/P coronary artery stent placement  1/6/09      S/P placement of cardiac pacemaker          SOCIAL HISTORY: Substance Use (street drugs): ( x ) never used  (  ) other:    FAMILY HISTORY:  No pertinent family history in first degree relatives             PHYSICAL EXAM:  T(C): 36.6 (03-10-24 @ 05:33), Max: 36.7 (03-10-24 @ 00:00)  HR: 86 (03-10-24 @ 09:24) (86 - 92)  BP: 114/52 (03-10-24 @ 05:33) (98/54 - 119/49)  RR: 18 (03-10-24 @ 05:33) (18 - 24)  SpO2: 98% (03-10-24 @ 09:24) (96% - 100%)  Wt(kg): --  I&O's Summary    09 Mar 2024 06:01  -  10 Mar 2024 07:00  --------------------------------------------------------  IN: 700 mL / OUT: 1700 mL / NET: -1000 mL          GENERAL: s/p trach  EYES:   PERRLA   ENMT:   Moist mucous membranes, Good dentition, No lesions  Cardiovascular: Normal S1 S2, No JVD, No murmurs, No edema  Respiratory: Lungs clear to auscultation	  Gastrointestinal:  s/p PEG   Extremities: no edema                                      8.5    7.86  )-----------( 383      ( 10 Mar 2024 06:54 )             27.2     03-10    139  |  97<L>  |  42<H>  ----------------------------<  139<H>  4.7   |  30  |  1.36<H>    Ca    9.1      10 Mar 2024 06:54  Phos  3.9     03-10  Mg     2.30     03-10      proBNP:   Lipid Profile:   HgA1c:   TSH:     Consultant(s) Notes Reviewed:  [x ] YES  [ ] NO    Care Discussed with Consultants/Other Providers [ x] YES  [ ] NO    Imaging Personally Reviewed independently:  [x] YES  [ ] NO    All labs, radiologic studies, vitals, orders and medications list reviewed. Patient is seen and examined at bedside. Case discussed with medical team.

## 2024-03-11 LAB
ANION GAP SERPL CALC-SCNC: 11 MMOL/L — SIGNIFICANT CHANGE UP (ref 7–14)
BASOPHILS # BLD AUTO: 0.05 K/UL — SIGNIFICANT CHANGE UP (ref 0–0.2)
BASOPHILS NFR BLD AUTO: 0.8 % — SIGNIFICANT CHANGE UP (ref 0–2)
BUN SERPL-MCNC: 49 MG/DL — HIGH (ref 7–23)
CALCIUM SERPL-MCNC: 8.8 MG/DL — SIGNIFICANT CHANGE UP (ref 8.4–10.5)
CHLORIDE SERPL-SCNC: 96 MMOL/L — LOW (ref 98–107)
CO2 SERPL-SCNC: 31 MMOL/L — SIGNIFICANT CHANGE UP (ref 22–31)
CREAT SERPL-MCNC: 1.44 MG/DL — HIGH (ref 0.5–1.3)
EGFR: 46 ML/MIN/1.73M2 — LOW
EOSINOPHIL # BLD AUTO: 0.41 K/UL — SIGNIFICANT CHANGE UP (ref 0–0.5)
EOSINOPHIL NFR BLD AUTO: 6.3 % — HIGH (ref 0–6)
GLUCOSE BLDC GLUCOMTR-MCNC: 169 MG/DL — HIGH (ref 70–99)
GLUCOSE BLDC GLUCOMTR-MCNC: 181 MG/DL — HIGH (ref 70–99)
GLUCOSE BLDC GLUCOMTR-MCNC: 187 MG/DL — HIGH (ref 70–99)
GLUCOSE BLDC GLUCOMTR-MCNC: 213 MG/DL — HIGH (ref 70–99)
GLUCOSE SERPL-MCNC: 203 MG/DL — HIGH (ref 70–99)
HCT VFR BLD CALC: 23.8 % — LOW (ref 39–50)
HGB BLD-MCNC: 7.9 G/DL — LOW (ref 13–17)
IANC: 3.73 K/UL — SIGNIFICANT CHANGE UP (ref 1.8–7.4)
IMM GRANULOCYTES NFR BLD AUTO: 0.2 % — SIGNIFICANT CHANGE UP (ref 0–0.9)
LYMPHOCYTES # BLD AUTO: 1.69 K/UL — SIGNIFICANT CHANGE UP (ref 1–3.3)
LYMPHOCYTES # BLD AUTO: 25.8 % — SIGNIFICANT CHANGE UP (ref 13–44)
MAGNESIUM SERPL-MCNC: 2.4 MG/DL — SIGNIFICANT CHANGE UP (ref 1.6–2.6)
MCHC RBC-ENTMCNC: 29.9 PG — SIGNIFICANT CHANGE UP (ref 27–34)
MCHC RBC-ENTMCNC: 33.2 GM/DL — SIGNIFICANT CHANGE UP (ref 32–36)
MCV RBC AUTO: 90.2 FL — SIGNIFICANT CHANGE UP (ref 80–100)
MONOCYTES # BLD AUTO: 0.67 K/UL — SIGNIFICANT CHANGE UP (ref 0–0.9)
MONOCYTES NFR BLD AUTO: 10.2 % — SIGNIFICANT CHANGE UP (ref 2–14)
NEUTROPHILS # BLD AUTO: 3.73 K/UL — SIGNIFICANT CHANGE UP (ref 1.8–7.4)
NEUTROPHILS NFR BLD AUTO: 56.7 % — SIGNIFICANT CHANGE UP (ref 43–77)
NRBC # BLD: 0 /100 WBCS — SIGNIFICANT CHANGE UP (ref 0–0)
NRBC # FLD: 0 K/UL — SIGNIFICANT CHANGE UP (ref 0–0)
PHOSPHATE SERPL-MCNC: 4 MG/DL — SIGNIFICANT CHANGE UP (ref 2.5–4.5)
PLATELET # BLD AUTO: 313 K/UL — SIGNIFICANT CHANGE UP (ref 150–400)
POTASSIUM SERPL-MCNC: 4.7 MMOL/L — SIGNIFICANT CHANGE UP (ref 3.5–5.3)
POTASSIUM SERPL-SCNC: 4.7 MMOL/L — SIGNIFICANT CHANGE UP (ref 3.5–5.3)
RBC # BLD: 2.64 M/UL — LOW (ref 4.2–5.8)
RBC # FLD: 16.2 % — HIGH (ref 10.3–14.5)
SODIUM SERPL-SCNC: 138 MMOL/L — SIGNIFICANT CHANGE UP (ref 135–145)
WBC # BLD: 6.56 K/UL — SIGNIFICANT CHANGE UP (ref 3.8–10.5)
WBC # FLD AUTO: 6.56 K/UL — SIGNIFICANT CHANGE UP (ref 3.8–10.5)

## 2024-03-11 PROCEDURE — 99233 SBSQ HOSP IP/OBS HIGH 50: CPT | Mod: FS

## 2024-03-11 PROCEDURE — 93970 EXTREMITY STUDY: CPT | Mod: 26

## 2024-03-11 PROCEDURE — 90792 PSYCH DIAG EVAL W/MED SRVCS: CPT

## 2024-03-11 PROCEDURE — 93010 ELECTROCARDIOGRAM REPORT: CPT

## 2024-03-11 RX ORDER — DIVALPROEX SODIUM 500 MG/1
125 TABLET, DELAYED RELEASE ORAL
Refills: 0 | Status: DISCONTINUED | OUTPATIENT
Start: 2024-03-11 | End: 2024-03-12

## 2024-03-11 RX ORDER — DARBEPOETIN ALFA IN POLYSORBAT 200MCG/0.4
60 PEN INJECTOR (ML) SUBCUTANEOUS ONCE
Refills: 0 | Status: COMPLETED | OUTPATIENT
Start: 2024-03-11 | End: 2024-03-11

## 2024-03-11 RX ORDER — LANOLIN ALCOHOL/MO/W.PET/CERES
3 CREAM (GRAM) TOPICAL ONCE
Refills: 0 | Status: COMPLETED | OUTPATIENT
Start: 2024-03-11 | End: 2024-03-11

## 2024-03-11 RX ORDER — INSULIN GLARGINE 100 [IU]/ML
14 INJECTION, SOLUTION SUBCUTANEOUS
Refills: 0 | Status: DISCONTINUED | OUTPATIENT
Start: 2024-03-11 | End: 2024-03-27

## 2024-03-11 RX ORDER — DIVALPROEX SODIUM 500 MG/1
125 TABLET, DELAYED RELEASE ORAL
Refills: 0 | Status: DISCONTINUED | OUTPATIENT
Start: 2024-03-11 | End: 2024-03-11

## 2024-03-11 RX ADMIN — CHLORHEXIDINE GLUCONATE 1 APPLICATION(S): 213 SOLUTION TOPICAL at 11:32

## 2024-03-11 RX ADMIN — HEPARIN SODIUM 5000 UNIT(S): 5000 INJECTION INTRAVENOUS; SUBCUTANEOUS at 14:45

## 2024-03-11 RX ADMIN — INSULIN GLARGINE 12 UNIT(S): 100 INJECTION, SOLUTION SUBCUTANEOUS at 11:30

## 2024-03-11 RX ADMIN — TICAGRELOR 60 MILLIGRAM(S): 90 TABLET ORAL at 05:13

## 2024-03-11 RX ADMIN — Medication 1 GRAM(S): at 17:41

## 2024-03-11 RX ADMIN — Medication 6 MILLIGRAM(S): at 21:24

## 2024-03-11 RX ADMIN — Medication 0.25 MILLIGRAM(S): at 23:12

## 2024-03-11 RX ADMIN — TICAGRELOR 60 MILLIGRAM(S): 90 TABLET ORAL at 17:42

## 2024-03-11 RX ADMIN — Medication 3 MILLILITER(S): at 23:34

## 2024-03-11 RX ADMIN — Medication 60 MICROGRAM(S): at 19:44

## 2024-03-11 RX ADMIN — Medication 2 MILLIGRAM(S): at 22:28

## 2024-03-11 RX ADMIN — LEVETIRACETAM 500 MILLIGRAM(S): 250 TABLET, FILM COATED ORAL at 17:42

## 2024-03-11 RX ADMIN — Medication 1 APPLICATION(S): at 22:28

## 2024-03-11 RX ADMIN — Medication 3 MILLILITER(S): at 07:51

## 2024-03-11 RX ADMIN — Medication 20 MILLIGRAM(S): at 05:15

## 2024-03-11 RX ADMIN — CHLORHEXIDINE GLUCONATE 15 MILLILITER(S): 213 SOLUTION TOPICAL at 05:14

## 2024-03-11 RX ADMIN — Medication 81 MILLIGRAM(S): at 11:31

## 2024-03-11 RX ADMIN — CARVEDILOL PHOSPHATE 6.25 MILLIGRAM(S): 80 CAPSULE, EXTENDED RELEASE ORAL at 17:41

## 2024-03-11 RX ADMIN — ESCITALOPRAM OXALATE 10 MILLIGRAM(S): 10 TABLET, FILM COATED ORAL at 11:30

## 2024-03-11 RX ADMIN — Medication 3 MILLIGRAM(S): at 23:11

## 2024-03-11 RX ADMIN — CHLORHEXIDINE GLUCONATE 15 MILLILITER(S): 213 SOLUTION TOPICAL at 17:42

## 2024-03-11 RX ADMIN — Medication 4: at 05:15

## 2024-03-11 RX ADMIN — Medication 1 GRAM(S): at 05:13

## 2024-03-11 RX ADMIN — CARVEDILOL PHOSPHATE 6.25 MILLIGRAM(S): 80 CAPSULE, EXTENDED RELEASE ORAL at 05:14

## 2024-03-11 RX ADMIN — SENNA PLUS 10 MILLILITER(S): 8.6 TABLET ORAL at 22:28

## 2024-03-11 RX ADMIN — Medication 2: at 23:25

## 2024-03-11 RX ADMIN — HEPARIN SODIUM 5000 UNIT(S): 5000 INJECTION INTRAVENOUS; SUBCUTANEOUS at 22:27

## 2024-03-11 RX ADMIN — Medication 1 DROP(S): at 17:41

## 2024-03-11 RX ADMIN — LEVETIRACETAM 500 MILLIGRAM(S): 250 TABLET, FILM COATED ORAL at 05:13

## 2024-03-11 RX ADMIN — Medication 2: at 11:33

## 2024-03-11 RX ADMIN — Medication 50 MILLIGRAM(S): at 05:15

## 2024-03-11 RX ADMIN — POLYETHYLENE GLYCOL 3350 17 GRAM(S): 17 POWDER, FOR SOLUTION ORAL at 11:31

## 2024-03-11 RX ADMIN — HEPARIN SODIUM 5000 UNIT(S): 5000 INJECTION INTRAVENOUS; SUBCUTANEOUS at 05:14

## 2024-03-11 RX ADMIN — PANTOPRAZOLE SODIUM 40 MILLIGRAM(S): 20 TABLET, DELAYED RELEASE ORAL at 05:13

## 2024-03-11 RX ADMIN — Medication 50 MILLIGRAM(S): at 14:44

## 2024-03-11 RX ADMIN — Medication 1 DROP(S): at 05:14

## 2024-03-11 RX ADMIN — Medication 2: at 17:41

## 2024-03-11 RX ADMIN — Medication 0.5 MILLIGRAM(S): at 08:45

## 2024-03-11 RX ADMIN — Medication 0.5 MILLIGRAM(S): at 20:47

## 2024-03-11 RX ADMIN — DIVALPROEX SODIUM 125 MILLIGRAM(S): 500 TABLET, DELAYED RELEASE ORAL at 19:45

## 2024-03-11 RX ADMIN — PANTOPRAZOLE SODIUM 40 MILLIGRAM(S): 20 TABLET, DELAYED RELEASE ORAL at 17:42

## 2024-03-11 NOTE — BH CONSULTATION LIAISON ASSESSMENT NOTE - MSE UNSTRUCTURED FT
Limited exam, patient nonverbal due to trach.   On exam patient attends to interviewer and son with his eyes, nods his head to some questions but it mostly uncooperative with exam and appears somewhat delirious and confused. Unclear if he knows where he is. Does not respond to simple commands (family reports that he sometimes will squeeze their fingers on command).  Physical exam notable for cogwheel rigidity of UEs. No evidence of tremor.

## 2024-03-11 NOTE — BH CONSULTATION LIAISON ASSESSMENT NOTE - NSBHCHARTREVIEWLAB_PSY_A_CORE FT
7.9    6.56  )-----------( 313      ( 11 Mar 2024 05:05 )             23.8     03-11    138  |  96<L>  |  49<H>  ----------------------------<  203<H>  4.7   |  31  |  1.44<H>    Ca    8.8      11 Mar 2024 05:05  Phos  4.0     03-11  Mg     2.40     03-11      Urinalysis Basic - ( 11 Mar 2024 05:05 )    Color: x / Appearance: x / SG: x / pH: x  Gluc: 203 mg/dL / Ketone: x  / Bili: x / Urobili: x   Blood: x / Protein: x / Nitrite: x   Leuk Esterase: x / RBC: x / WBC x   Sq Epi: x / Non Sq Epi: x / Bacteria: x

## 2024-03-11 NOTE — PROGRESS NOTE ADULT - ASSESSMENT
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     EKG SR RBBB (old per family)     1) Hypoxia   - s/p bronch   - Rt pleural effusion   - held lasix given Hypernatremia and worsening creat , CXR with cephalization, given a does of IV lasix monitor u/o renal function    - f/u neuro recs MRI brain shows no acute disease  - s/p trach and PEG  -corrected QTc on    - on lasix 20mg PO daily        2) CAD s/p CABG  - p/w chest pain. EKG non ischemic , trop -sera   - chest pains  Trop mildly elevated and trending down likely sec to kidney disease,   - holding brilinta, was on it for SMA stent placed in 12/2023, held 2/2 anticipation for PEG placement, restart when no further invasive procedures planned  -TTE 2/12 with  mod decrease LV systolic function, new.  will medically manage likely stress induced.  repeat TTE with mild-mod LV dysfunction c/w PO lasix euvolemic on exam     3) Covid +  - s/p remdesivir   - c/w supportive management   - t/t per primary team   -resolved    4) Abd distension   -CTA AP obtained which showed  partially occlusive calcified and non-calcified plaque just distal to take-off of the SMA, with estimated at least 75% luminal narrowing. Limited evaluation of its distal branches. 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   -on Protonix IV q 12      6) Afib  -hx of PAF  -no AC 2/2 GIB  -on coreg 6.25mg BID

## 2024-03-11 NOTE — BH CONSULTATION LIAISON ASSESSMENT NOTE - NSBHATTESTCOMMENTATTENDFT_PSY_A_CORE
I saw and examined the patient this afternoon with Dr. Malone. I spoke with brother at bedside and brother on cell phone who is a neurologist.    Patient is delirious, with poor attention, cannot follow basic commands consistently. He also has what appears to be some cogwheeling at the LUE wrist.  His QTC is prolonged and both his JTI and JT are prolonged.    At this point in time, agree with trying standing VPA per above.  Will need to closely monitor his LFTs, platelets, ammonia, albumin.  PRN ativan for extreme agitation, not responsive to verbal redirection- as avoiding antipsychotics given QTC issues.

## 2024-03-11 NOTE — PROGRESS NOTE ADULT - NS ATTEND AMEND GEN_ALL_CORE FT
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 CT Head (1/31) without acute pathologic anatomy. EEG (2/6) without epileptiform discharges. On ASA/Brilinta from prior hx CVAs (without known neurologic deficits) with known hx severe b/l internal carotid narrowing without occlusions. On home SSRI, but pt continues to have periods of agitation with line/trach pulling unable to receive Seroquel 2/2 prolonged QTc. Will hold benzodiazepines. Psych consulted over the weekend for further assistance. Pt further remains physically weak and debilitated in the setting of prolonged critical illness with polyneuropathy.     Pt with cardiac hz as above now hemodynamically stable off vasopressors, on DMT as tolerated. TTE 2/24 with new biventricular failure with EF 38% and moderate MR/AS, repeat TTE 3/4 showing improved LVSF. Will c/w lasix + carvedilol + hydralazine + statin, holding isordil for now with uptitration of current medications first. Will c/w ASA/Brilinta (GEMMA 5/22). Cardiology following, recs appreciated.    Pt with acute hypoxemic respiratory failure 2/2 COVID19 +/- flash pulmonary edema with severe sepsis further c/b acute cholecystitis requiring ETT intubation/MV with failure to wean from ventilator s/p tracheostomy placement (2/13). Will c/w SBT trials as tolerated. Airway clearance therapy in place. Trach care and suctioning as per RCU team. Oral hygiene and aspiration precautions in place. Hemoptysis earlier in admission now resolved.     Pt with oropharyngeal dysphagia s/p PEG placement (GI, 2/20) now tolerating feeds at goal rate. Hospital course c/b acute cholecystitis s/p percutaneous asa (1/26), will remain until cholecystectomy. Abx course completed. SMA calcification initially found on CTA now s/p laparoscopy (12/24) with SMA angiogram and stent placement (12/25.) Will c/w DAPT. UGIB 2/2 esophagitis with bleeding Dieulafoy lesion s/p EGD (1/2) with clips. Will c/w PPI and Carafate. Hypernatremia improving on free water. DMII well controlled on basal/bolus insulin with ISS and BGFS monitoring.     Pt with hospital course c/b COVID with superimposed PNA s/p tx followed by ESBL Klebsiella PNA s/p Zosyn followed by broadening to Ertapenem through 3/6. Now off Abx. Will CTM conservatively with low threshold to restart abx.     Dispo pending medical optimization. Pt full code. DVT ppx HSQ. Plan for dispo to LTCF with ventilator capabilities. Will continue to update pt and family throughout hospitalization.

## 2024-03-11 NOTE — PROGRESS NOTE ADULT - ASSESSMENT
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 Course ultimately complicated by progressive WOB and O2 demand and patient intubated and transferred to MICU (1/22). Course further complicated by acute cholecystitis and s/p Perc Lynsey with IR on (1/26), HFrEF 38, pulmonary edema, superimposed PNA, failed extubation failed and prolonged vent time and s/p trach (2/13). Patient transferred to RCU (2/16) and s/p PEG (2/20). Course complicated by volume overload and diuresis and GDMT continued and HFrEF 45 with improvement    NEUROLOGY   # Encephalopathy   - Hx of CVA with no residual deficits per family, however per family worsening confusion at home over the past 6 months (becoming disoriented to time) but prior to admission has been more confused, talking about seeing people that are not present.  - CTH (1/31) with no evidence of acute infarct  - Continue on ASA and Brilinta  - Holding Neurontin, Memantine and Oxycodone in setting of somnolence    # ICA stenosis   - CTA NECK (1/31) with moderate to severe narrowing left internal carotid at the origin. Severe narrowing right internal carotid by a tandem lesion 1.5 cm above the bifurcation with a narrowed internal carotid beyond the lesion. Extensive calcified plaque both cavernous and supraclinoid carotid arteries with narrowing but no occlusion. Mild narrowing proximal right M1 segment. Moderate narrowing both vertebral V4 segments. No perfusion abnormality at the Tmax 6 second threshold, but moderate hypoperfusion in the right MCA territory at the 4 second threshold.   - Continue on ASA and Brilinta    # Myoclonic jerks   - Hx of myoclonic jerks post CVAs   - EEG (1/18-2/6) negative for seizures  - Continue on Keppra and per neuro wishes to wean, however family wishes to keep current dose as home medication.     # Depression/ Insomnia  - Continue on Lexapro per home medication   - Course complicated by agitation and pulling on tubes   - Continue on Seroquel but QTC prolonged and now held   - Continue on Ativan 0.5mg PO Q6H PRN  - Supportive care     CARDIOLOGY   # Vasoplegic vs septic shock  # Hx of HTN   - Weaned off pressors in ICU and now with mild hypertension   - Continue on coreg and hydral as below   - Strict BP control given moderate AS  - Increased vascular congestion on CXR (3/9) so will give additional Lasix 20mg PO x1 (3/10)    # AFIB RVR   # Bifascicular Block with One2starttronic PPM   - AFIB RVR episodes and PPM interrogated (2/20) by EP with similar episodes  - Previous admission in 6/2022 with cardiogenic syncope second to bifasicular block, however now with PPM and AV myron blockers ok.   - Continue on lopressor 12.5mg BID however replaced with coreg second to HFrEF   - AC discussed at length however with recent GIB will hold for now     # HFrEF 38 likely stress induced?   # Mild to moderate AS   - ECHO (12/2023) with EF 62 with normal LVSF and mild to moderate AS   - ECHO (2/2024) with EF 38, moderate LVSD with global LV hypokinesis, mildly reduced RVSF (TAPSE 1.3), mild to moderate MR, mild AR, and moderate AS.   - RPT ECHO (3/4) with EF 45 and mild to moderate LVSD   - Continue on lasix 20 QD, coreg 6.25 and hydralazine 50 (increased 3/4)   - Hold isordil and up titrate above medications first     # CAD with CABG   - CATH (5/2022) with dLAD 70, SVG graft to OM1 with 90 in stent stenosis s/p PCI and GEMMA placement, and LIMA graft to mLAD with no disease.   - Continue on ASA and brilinta    # Prolonged QTC   - ECG (3/2) with QTC 616ms (corrected using bazzet 490ms)   - ECG (3/3) with QTC 634ms (corrected using bazzet 490ms)   - Monitor off/ avoid QTC prolonging agents for now    RESPIRATORY   # Acute respiratory failure second to SARS-COV-2 vs pulm edema vs PNA  - Presented with COVID complicated by sepsis second to acute lynsey and worsening respiratory failure second to pulmonary edema requiring intubation.   - Prolonged intubation and s/p tracheostomy (2/13)   - CT CHEST 1/16 with new small bilateral pleural effusions/ atelectasis/ patchy bilateral ground-glass opacities are consistent with pulmonary edema.  - Completed ABX and COVID regimen as below   - Continue on vent and initially tolerating some SBT 10/5   - Continue on nebs and hold further HTS given intermittent hemoptysis  - Continue on diuresis as above    # Mild hemoptysis likely from suction trauma   - s/p bronch (2/18) with no active bleed  - Hemoptysis improved with TXA and holding further HTS as above   - Tiny hemoptysis second to suction trauma imporving  - Careful with suctioning   - Recurrent hemoptysis (3/9) so ordered for TXA nebs again however hemoptysis appeared self limited and stopped before nebs even given    HEENT   # L eye subconjunctival hemorrhage   - Continue on artifical tears and Lacrilube   - Optho eval appreciated     GI  # Nutrition/ Dysphagia   - PEG placed by GI on 2/20 and tube feeds continued.   - Loose stools and Banatrol continued     # UGIB   - EGD (1/2) with esophagitis and bleeding Dieulafoy's lesion s/p clips.   - Continue on PPI and carafate     # SMA calcification   - CTA demonstrating mesenteric fat stranding associated with ascending/transverse colon  - Diagnostic laparoscopy (12/24) with small bowel and visible colon viable, some inflammation of omentum in RUQ  - SMA Angiogram and stent placed (12/25).   - Continue on ASA and Brilinta     # Acute Cholecystitis   - CT (12/26) with distended GB with cholelithiasis and sludge   - HIDA (12/29) POSITIVE for acute cholecystitis   - Case discussed with IR at length and s/p PERC LYNSEY (1/26)   - Completed zosyn course (12/24-12/31)   - PERC LYNSEY tube to stay in until surgical candidate for cholecystectomy to prevent recurrence     / RENAL   # CKD   - CRE at baseline   - Monitor renal function and UOP   - Aranesp SQ x1 (3/8)    # Hypernatremia   -  Q4H added however sodium downtrending and FWF dc'ed 3/4   - Monitor closely with diuresis as above   -  BID restarted per Renal (3/8)    INFECTIOUS DISEASE   # COVID with superimposed PNA   # ESBL Kleb PNA   - COVID POSITIVE (12/23) and completed remdesivir x 1 dose and paxlovid course.   - WOB worsened as above requiring intubation and cultures negative. Zosyn completed empirically however hypothermic (2/11) and BCx, UA, RVP and BAL negative.   - Completed additional zosyn (2/12-2/19) empirically   - Fever spike and thick secretions and SCX (2/26) with ESBL klebs.   - s/p Zosyn (2/27 - 2/29) but resistant and completed Erta (2/29 - 3/6)     HEME  # AOCD   - Monitor HH   - DVT PPX with HSQ     ENDOCRINE   # DM2 A1C 9.3   - Continue on Lantus 12 and ISS     SKIN/ TUBES   - Trach (2/13)   - PEG (2/20)   - PERC LYNSEY (1/26 - )     ETHICS   - FULL CODE     DISPO - vent facility and family aware. SW with accepting facility however pending available bed.  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 Course ultimately complicated by progressive WOB and O2 demand and patient intubated and transferred to MICU (1/22). Course further complicated by acute cholecystitis and s/p Perc Lynsey with IR on (1/26), HFrEF 38, pulmonary edema, superimposed PNA, failed extubation failed and prolonged vent time and s/p trach (2/13). Patient transferred to RCU (2/16) and s/p PEG (2/20). Course complicated by volume overload and diuresis and GDMT continued and HFrEF 45 with improvement    NEUROLOGY   # Encephalopathy   - Hx of CVA with no residual deficits per family, however per family worsening confusion at home over the past 6 months (becoming disoriented to time) but prior to admission has been more confused, talking about seeing people that are not present.  - CTH (1/31) with no evidence of acute infarct  - Continue on ASA and Brilinta  - Holding Neurontin, Memantine and Oxycodone in setting of somnolence    # ICA stenosis   - CTA NECK (1/31) with moderate to severe narrowing left internal carotid at the origin. Severe narrowing right internal carotid by a tandem lesion 1.5 cm above the bifurcation with a narrowed internal carotid beyond the lesion. Extensive calcified plaque both cavernous and supraclinoid carotid arteries with narrowing but no occlusion. Mild narrowing proximal right M1 segment. Moderate narrowing both vertebral V4 segments. No perfusion abnormality at the Tmax 6 second threshold, but moderate hypoperfusion in the right MCA territory at the 4 second threshold.   - Continue on ASA and Brilinta    # Myoclonic jerks   - Hx of myoclonic jerks post CVAs   - EEG (1/18-2/6) negative for seizures  - Continue on Keppra and per neuro wishes to wean, however family wishes to keep current dose as home medication.     # Depression/ Insomnia  - Continue on Lexapro per home medication   - Course complicated by agitation and pulling on tubes   - Continue on Seroquel but QTC prolonged and now held   - Continue on Ativan 0.5mg PO Q6H PRN  - Supportive care     # Agitation in setting of delirium  - 3/11 Psychiatry consulted, started Depakote 125 mg PO BID   - monitor platelets, LFTs while on Depakote     CARDIOLOGY   # Vasoplegic vs septic shock  # Hx of HTN   - Weaned off pressors in ICU and now with mild hypertension   - Continue on coreg and hydral as below   - Strict BP control given moderate AS  - Increased vascular congestion on CXR (3/9) so will give additional Lasix 20mg PO x1 (3/10)    # AFIB RVR   # Bifascicular Block with Syncbaktronic PPM   - AFIB RVR episodes and PPM interrogated (2/20) by EP with similar episodes  - Previous admission in 6/2022 with cardiogenic syncope second to bifasicular block, however now with PPM and AV myron blockers ok.   - Continue on lopressor 12.5mg BID however replaced with coreg second to HFrEF   - AC discussed at length however with recent GIB will hold for now     # HFrEF 38 likely stress induced?   # Mild to moderate AS   - ECHO (12/2023) with EF 62 with normal LVSF and mild to moderate AS   - ECHO (2/2024) with EF 38, moderate LVSD with global LV hypokinesis, mildly reduced RVSF (TAPSE 1.3), mild to moderate MR, mild AR, and moderate AS.   - RPT ECHO (3/4) with EF 45 and mild to moderate LVSD   - Continue on lasix 20 QD, coreg 6.25 and hydralazine 50 (increased 3/4)   - Hold isordil and up titrate above medications first     # CAD with CABG   - CATH (5/2022) with dLAD 70, SVG graft to OM1 with 90 in stent stenosis s/p PCI and GEMMA placement, and LIMA graft to mLAD with no disease.   - Continue on ASA and brilinta    # Prolonged QTC   - ECG (3/2) with QTC 616ms (corrected using bazzet 490ms)   - ECG (3/3) with QTC 634ms (corrected using bazzet 490ms)   - ECG (3/11) with QTC 490ms   - Monitor off/ avoid QTC prolonging agents for now    RESPIRATORY   # Acute respiratory failure second to SARS-COV-2 vs pulm edema vs PNA  - Presented with COVID complicated by sepsis second to acute lynsey and worsening respiratory failure second to pulmonary edema requiring intubation.   - Prolonged intubation and s/p tracheostomy (2/13)   - CT CHEST 1/16 with new small bilateral pleural effusions/ atelectasis/ patchy bilateral ground-glass opacities are consistent with pulmonary edema.  - Completed ABX and COVID regimen as below   - Continue on vent and initially tolerating some SBT 10/5   - Continue on nebs and hold further HTS given intermittent hemoptysis  - Continue on diuresis as above    # Mild hemoptysis likely from suction trauma   - s/p bronch (2/18) with no active bleed  - Hemoptysis improved with TXA and holding further HTS as above   - Tiny hemoptysis second to suction trauma imporving  - Careful with suctioning   - Recurrent hemoptysis (3/9) so ordered for TXA nebs again however hemoptysis appeared self limited and stopped before nebs even given    HEENT   # L eye subconjunctival hemorrhage   - Continue on artifical tears and Lacrilube   - Optho eval appreciated     GI  # Nutrition/ Dysphagia   - PEG placed by GI on 2/20 and tube feeds continued.   - Loose stools and Banatrol continued     # UGIB   - EGD (1/2) with esophagitis and bleeding Dieulafoy's lesion s/p clips.   - Continue on PPI and carafate     # SMA calcification   - CTA demonstrating mesenteric fat stranding associated with ascending/transverse colon  - Diagnostic laparoscopy (12/24) with small bowel and visible colon viable, some inflammation of omentum in RUQ  - SMA Angiogram and stent placed (12/25).   - Continue on ASA and Brilinta     # Acute Cholecystitis   - CT (12/26) with distended GB with cholelithiasis and sludge   - HIDA (12/29) POSITIVE for acute cholecystitis   - Case discussed with IR at length and s/p PERC LYNSEY (1/26)   - Completed zosyn course (12/24-12/31)   - PERC LYNSEY tube to stay in until surgical candidate for cholecystectomy to prevent recurrence     / RENAL   # CKD   - CRE at baseline   - Monitor renal function and UOP   - Aranesp SQ x1 (3/8)    # Hypernatremia (resolved)   - s/p  Q4H  - Monitor closely with diuresis as above       INFECTIOUS DISEASE   # COVID with superimposed PNA   # ESBL Kleb PNA   - COVID POSITIVE (12/23) and completed remdesivir x 1 dose and paxlovid course.   - WOB worsened as above requiring intubation and cultures negative. Zosyn completed empirically however hypothermic (2/11) and BCx, UA, RVP and BAL negative.   - Completed additional zosyn (2/12-2/19) empirically   - Fever spike and thick secretions and SCX (2/26) with ESBL klebs.   - s/p Zosyn (2/27 - 2/29) but resistant and completed Erta (2/29 - 3/6)     HEME  # AOCD   - Monitor HH   - DVT PPX with HSQ     ENDOCRINE   # DM2 A1C 9.3   - Continue on Lantus 14 and ISS     SKIN/ TUBES   - Trach (2/13)   - PEG (2/20)   - PERC LYNSEY (1/26 - )     ETHICS   - FULL CODE     DISPO - vent facility and family aware. SW with accepting facility however pending available bed.

## 2024-03-11 NOTE — PROGRESS NOTE ADULT - ASSESSMENT
renal attending;  A-w plan of care as written above .      [facility]  Office: [phone]

## 2024-03-11 NOTE — PROGRESS NOTE ADULT - SUBJECTIVE AND OBJECTIVE BOX
CHIEF COMPLAINT: Patient is a 89y old  Male who presents with a chief complaint of COVID19, Chest pain (10 Mar 2024 12:26)    INTERVAL EVENTS: No overnight events.    ROS: Seen by bedside during AM rounds     OBJECTIVE:  ICU Vital Signs Last 24 Hrs  T(C): 36.5 (11 Mar 2024 04:00), Max: 36.7 (10 Mar 2024 09:07)  T(F): 97.7 (11 Mar 2024 04:00), Max: 98.1 (10 Mar 2024 11:44)  HR: 79 (11 Mar 2024 04:00) (75 - 88)  BP: 137/79 (11 Mar 2024 04:00) (116/57 - 137/79)  BP(mean): --  ABP: --  ABP(mean): --  RR: 22 (11 Mar 2024 04:00) (14 - 22)  SpO2: 98% (11 Mar 2024 04:00) (98% - 100%)    O2 Parameters below as of 11 Mar 2024 04:00  Patient On (Oxygen Delivery Method): ventilator          Mode: AC/ CMV (Assist Control/ Continuous Mandatory Ventilation), RR (machine): 12, TV (machine): 0.45, FiO2: 30, PEEP: 5, ITime: 0.72, MAP: 12, PIP: 30    03-09 @ 06:01  -  03-10 @ 07:00  --------------------------------------------------------  IN: 700 mL / OUT: 1700 mL / NET: -1000 mL    03-10 @ 07:01 - 03-11 @ 06:49  --------------------------------------------------------  IN: 1700 mL / OUT: 10 mL / NET: 1690 mL      CAPILLARY BLOOD GLUCOSE      POCT Blood Glucose.: 213 mg/dL (11 Mar 2024 05:14)      PHYSICAL EXAM:  General:   HEENT:   Lymph Nodes:  Neck:   Respiratory:   Cardiovascular:   Abdomen:   Extremities:   Skin:   Neurological:  Psychiatry:    Mode: AC/ CMV (Assist Control/ Continuous Mandatory Ventilation)  RR (machine): 12  TV (machine): 0.45  FiO2: 30  PEEP: 5  ITime: 0.72  MAP: 12  PIP: 30      HOSPITAL MEDICATIONS:  MEDICATIONS  (STANDING):  albuterol/ipratropium for Nebulization 3 milliLiter(s) Nebulizer every 12 hours  artificial  tears Solution 1 Drop(s) Left EYE four times a day  aspirin  chewable 81 milliGRAM(s) Enteral Tube daily  carvedilol 6.25 milliGRAM(s) Oral every 12 hours  chlorhexidine 0.12% Liquid 15 milliLiter(s) Oral Mucosa every 12 hours  chlorhexidine 2% Cloths 1 Application(s) Topical daily  darbepoetin Injectable ViaL 60 MICROGram(s) SubCutaneous once  dextrose 5%. 1000 milliLiter(s) (50 mL/Hr) IV Continuous <Continuous>  dextrose 5%. 1000 milliLiter(s) (100 mL/Hr) IV Continuous <Continuous>  dextrose 50% Injectable 25 Gram(s) IV Push once  dextrose 50% Injectable 25 Gram(s) IV Push once  dextrose 50% Injectable 12.5 Gram(s) IV Push once  doxazosin 2 milliGRAM(s) Oral at bedtime  escitalopram Solution 10 milliGRAM(s) Oral daily  furosemide    Tablet 20 milliGRAM(s) Oral daily  glucagon  Injectable 1 milliGRAM(s) IntraMuscular once  heparin   Injectable 5000 Unit(s) SubCutaneous every 8 hours  hydrALAZINE 50 milliGRAM(s) Oral every 8 hours  insulin glargine Injectable (LANTUS) 12 Unit(s) SubCutaneous <User Schedule>  insulin lispro (ADMELOG) corrective regimen sliding scale   SubCutaneous every 6 hours  levETIRAcetam  Solution 500 milliGRAM(s) Oral two times a day  melatonin 6 milliGRAM(s) Oral at bedtime  pantoprazole   Suspension 40 milliGRAM(s) Oral every 12 hours  petrolatum Ophthalmic Ointment 1 Application(s) Left EYE at bedtime  polyethylene glycol 3350 17 Gram(s) Oral daily  senna Syrup 10 milliLiter(s) Oral at bedtime  sucralfate suspension 1 Gram(s) Enteral Tube two times a day  ticagrelor 60 milliGRAM(s) Oral every 12 hours    MEDICATIONS  (PRN):  acetaminophen   Oral Liquid .. 650 milliGRAM(s) Oral every 6 hours PRN Temp greater or equal to 38C (100.4F), Mild Pain (1 - 3), Moderate Pain (4 - 6)  dextrose Oral Gel 15 Gram(s) Oral once PRN Blood Glucose LESS THAN 70 milliGRAM(s)/deciliter  LORazepam     Tablet 0.5 milliGRAM(s) Oral every 6 hours PRN Agitation  tranexamic acid Injectable for Nebulization 500 milliGRAM(s) Inhalation every 8 hours PRN hemoptysis      LABS:                        7.9    6.56  )-----------( 313      ( 11 Mar 2024 05:05 )             23.8     03-11    138  |  96<L>  |  49<H>  ----------------------------<  203<H>  4.7   |  31  |  1.44<H>    Ca    8.8      11 Mar 2024 05:05  Phos  4.0     03-11  Mg     2.40     03-11        Urinalysis Basic - ( 11 Mar 2024 05:05 )    Color: x / Appearance: x / SG: x / pH: x  Gluc: 203 mg/dL / Ketone: x  / Bili: x / Urobili: x   Blood: x / Protein: x / Nitrite: x   Leuk Esterase: x / RBC: x / WBC x   Sq Epi: x / Non Sq Epi: x / Bacteria: x         CHIEF COMPLAINT: Patient is a 89y old  Male who presents with a chief complaint of COVID19, Chest pain (10 Mar 2024 12:26)    INTERVAL EVENTS: No overnight events.    ROS: Seen by bedside during AM rounds. Unable to obtain 2/2 mental status.     OBJECTIVE:  ICU Vital Signs Last 24 Hrs  T(C): 36.5 (11 Mar 2024 04:00), Max: 36.7 (10 Mar 2024 09:07)  T(F): 97.7 (11 Mar 2024 04:00), Max: 98.1 (10 Mar 2024 11:44)  HR: 79 (11 Mar 2024 04:00) (75 - 88)  BP: 137/79 (11 Mar 2024 04:00) (116/57 - 137/79)  BP(mean): --  ABP: --  ABP(mean): --  RR: 22 (11 Mar 2024 04:00) (14 - 22)  SpO2: 98% (11 Mar 2024 04:00) (98% - 100%)    O2 Parameters below as of 11 Mar 2024 04:00  Patient On (Oxygen Delivery Method): ventilator          Mode: AC/ CMV (Assist Control/ Continuous Mandatory Ventilation), RR (machine): 12, TV (machine): 0.45, FiO2: 30, PEEP: 5, ITime: 0.72, MAP: 12, PIP: 30    03-09 @ 06:01  -  03-10 @ 07:00  --------------------------------------------------------  IN: 700 mL / OUT: 1700 mL / NET: -1000 mL    03-10 @ 07:01  -  03-11 @ 06:49  --------------------------------------------------------  IN: 1700 mL / OUT: 10 mL / NET: 1690 mL      CAPILLARY BLOOD GLUCOSE      POCT Blood Glucose.: 213 mg/dL (11 Mar 2024 05:14)      Mode: AC/ CMV (Assist Control/ Continuous Mandatory Ventilation)  RR (machine): 12  TV (machine): 0.45  FiO2: 30  PEEP: 5  ITime: 0.72  MAP: 12  PIP: 30      HOSPITAL MEDICATIONS:  MEDICATIONS  (STANDING):  albuterol/ipratropium for Nebulization 3 milliLiter(s) Nebulizer every 12 hours  artificial  tears Solution 1 Drop(s) Left EYE four times a day  aspirin  chewable 81 milliGRAM(s) Enteral Tube daily  carvedilol 6.25 milliGRAM(s) Oral every 12 hours  chlorhexidine 0.12% Liquid 15 milliLiter(s) Oral Mucosa every 12 hours  chlorhexidine 2% Cloths 1 Application(s) Topical daily  darbepoetin Injectable ViaL 60 MICROGram(s) SubCutaneous once  dextrose 5%. 1000 milliLiter(s) (50 mL/Hr) IV Continuous <Continuous>  dextrose 5%. 1000 milliLiter(s) (100 mL/Hr) IV Continuous <Continuous>  dextrose 50% Injectable 25 Gram(s) IV Push once  dextrose 50% Injectable 25 Gram(s) IV Push once  dextrose 50% Injectable 12.5 Gram(s) IV Push once  doxazosin 2 milliGRAM(s) Oral at bedtime  escitalopram Solution 10 milliGRAM(s) Oral daily  furosemide    Tablet 20 milliGRAM(s) Oral daily  glucagon  Injectable 1 milliGRAM(s) IntraMuscular once  heparin   Injectable 5000 Unit(s) SubCutaneous every 8 hours  hydrALAZINE 50 milliGRAM(s) Oral every 8 hours  insulin glargine Injectable (LANTUS) 12 Unit(s) SubCutaneous <User Schedule>  insulin lispro (ADMELOG) corrective regimen sliding scale   SubCutaneous every 6 hours  levETIRAcetam  Solution 500 milliGRAM(s) Oral two times a day  melatonin 6 milliGRAM(s) Oral at bedtime  pantoprazole   Suspension 40 milliGRAM(s) Oral every 12 hours  petrolatum Ophthalmic Ointment 1 Application(s) Left EYE at bedtime  polyethylene glycol 3350 17 Gram(s) Oral daily  senna Syrup 10 milliLiter(s) Oral at bedtime  sucralfate suspension 1 Gram(s) Enteral Tube two times a day  ticagrelor 60 milliGRAM(s) Oral every 12 hours    MEDICATIONS  (PRN):  acetaminophen   Oral Liquid .. 650 milliGRAM(s) Oral every 6 hours PRN Temp greater or equal to 38C (100.4F), Mild Pain (1 - 3), Moderate Pain (4 - 6)  dextrose Oral Gel 15 Gram(s) Oral once PRN Blood Glucose LESS THAN 70 milliGRAM(s)/deciliter  LORazepam     Tablet 0.5 milliGRAM(s) Oral every 6 hours PRN Agitation  tranexamic acid Injectable for Nebulization 500 milliGRAM(s) Inhalation every 8 hours PRN hemoptysis      LABS:                        7.9    6.56  )-----------( 313      ( 11 Mar 2024 05:05 )             23.8     03-11    138  |  96<L>  |  49<H>  ----------------------------<  203<H>  4.7   |  31  |  1.44<H>    Ca    8.8      11 Mar 2024 05:05  Phos  4.0     03-11  Mg     2.40     03-11        Urinalysis Basic - ( 11 Mar 2024 05:05 )    Color: x / Appearance: x / SG: x / pH: x  Gluc: 203 mg/dL / Ketone: x  / Bili: x / Urobili: x   Blood: x / Protein: x / Nitrite: x   Leuk Esterase: x / RBC: x / WBC x   Sq Epi: x / Non Sq Epi: x / Bacteria: x

## 2024-03-11 NOTE — BH CONSULTATION LIAISON ASSESSMENT NOTE - NSBHCHARTREVIEWVS_PSY_A_CORE FT
Vital Signs Last 24 Hrs  T(C): 36.5 (11 Mar 2024 04:00), Max: 36.7 (10 Mar 2024 17:55)  T(F): 97.7 (11 Mar 2024 04:00), Max: 98.1 (10 Mar 2024 21:00)  HR: 79 (11 Mar 2024 13:58) (75 - 80)  BP: 137/79 (11 Mar 2024 04:00) (124/52 - 137/79)  BP(mean): --  RR: 22 (11 Mar 2024 04:00) (14 - 22)  SpO2: 98% (11 Mar 2024 13:58) (98% - 100%)    Parameters below as of 11 Mar 2024 04:00  Patient On (Oxygen Delivery Method): ventilator

## 2024-03-11 NOTE — BH CONSULTATION LIAISON ASSESSMENT NOTE - NSBHCHARTREVIEWINVESTIGATE_PSY_A_CORE FT
< from: 12 Lead ECG (03.03.24 @ 11:40) >      Ventricular Rate 93 BPM    Atrial Rate 93 BPM    P-R Interval 258 ms    QRS Duration 134 ms    Q-T Interval 510 ms    QTC Calculation(Bazett) 634 ms    P Axis 37 degrees    R Axis 13 degrees    T Axis 48 degrees    Diagnosis Line Sinus rhythm with 1st degree A-V block  Right bundle branch block  Abnormal ECG    < from: CT Head No Cont (01.31.24 @ 21:00) >      1. No CT evidence ofan acute territorial infarct or hemorrhage, with   underlying volume loss. No hemorrhage or mass identified.  2. Extensive calcified plaque in the head and neck.  3. Moderate to severe narrowing left internal carotid at the origin.   Severe narrowingright internal carotid by a tandem lesion 1.5 cm above   the bifurcation with a narrowed internal carotid beyond the lesion.  4. Extensive calcified plaque both cavernous and supraclinoid carotid   arteries with narrowing but no occlusion. Mild narrowing proximal right   M1 segment. Moderate narrowing both vertebral V4 segments  5. No perfusion abnormality at the Tmax 6 second threshold, but moderate   hypoperfusion in the right MCA territory at the 4 second threshold    Follow-up MR imaging of the brain recommended for further assessment.

## 2024-03-11 NOTE — BH CONSULTATION LIAISON ASSESSMENT NOTE - SUMMARY
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 Course ultimately complicated by progressive WOB and O2 demand and patient intubated and transferred to MICU (1/22). Course further complicated by acute cholecystitis and s/p Perc Lynsey with IR on (1/26), HFrEF 38, pulmonary edema, superimposed PNA, failed extubation failed and prolonged vent time and s/p trach (2/13). Patient transferred to RCU (2/16) and s/p PEG (2/20). Course complicated by volume overload and diuresis and GDMT continued and HFrEF 45 with improvement.  No known psychiatric history. Psychiatry consulted for agitation. He has been receiving PO and IM ativan several times per day, concern for oversedation. Son and DIL are physicians and requested consult.     Due to multiple medical comorbities and prolonged QTc, would hold off on antipsychotics and other QT prolonging agents.   Ativan is not an ideal PRN for this patient given deliriogenic effects and paradoxical activation.  Discussed with family plan to start low dose depakote for impulsivity and agitation and uptitrated as tolerated.    Plan:  - depakote 125mg PO BID  - can use ativan 0.5 mg IV for breakthrough severe agitation, would try to avoid  - delirium precautions   - monitor LFTs, platelets, ammonia

## 2024-03-11 NOTE — PROGRESS NOTE ADULT - SUBJECTIVE AND OBJECTIVE BOX
Aashish Boyer MD  Interventional Cardiology / Endovascular Specialist  [place] Office : [phone] [place] [id number] Tel:   [place] Office : [phone] [facility] [address]  Tel: [phone]      Subjective/Overnight events: Patient lying in bed. No acute distress.   	  MEDICATIONS:  aspirin  chewable 81 milliGRAM(s) Enteral Tube daily  carvedilol 6.25 milliGRAM(s) Oral every 12 hours  doxazosin 2 milliGRAM(s) Oral at bedtime  furosemide    Tablet 20 milliGRAM(s) Oral daily  heparin   Injectable 5000 Unit(s) SubCutaneous every 8 hours  hydrALAZINE 50 milliGRAM(s) Oral every 8 hours  ticagrelor 60 milliGRAM(s) Oral every 12 hours  tranexamic acid Injectable for Nebulization 500 milliGRAM(s) Inhalation every 8 hours PRN      albuterol/ipratropium for Nebulization 3 milliLiter(s) Nebulizer every 12 hours    acetaminophen   Oral Liquid .. 650 milliGRAM(s) Oral every 6 hours PRN  escitalopram Solution 10 milliGRAM(s) Oral daily  levETIRAcetam  Solution 500 milliGRAM(s) Oral two times a day  LORazepam     Tablet 0.5 milliGRAM(s) Oral every 6 hours PRN  melatonin 6 milliGRAM(s) Oral at bedtime    pantoprazole   Suspension 40 milliGRAM(s) Oral every 12 hours  polyethylene glycol 3350 17 Gram(s) Oral daily  senna Syrup 10 milliLiter(s) Oral at bedtime  sucralfate suspension 1 Gram(s) Enteral Tube two times a day    dextrose 50% Injectable 25 Gram(s) IV Push once  dextrose 50% Injectable 12.5 Gram(s) IV Push once  dextrose 50% Injectable 25 Gram(s) IV Push once  dextrose Oral Gel 15 Gram(s) Oral once PRN  glucagon  Injectable 1 milliGRAM(s) IntraMuscular once  insulin glargine Injectable (LANTUS) 12 Unit(s) SubCutaneous <User Schedule>  insulin lispro (ADMELOG) corrective regimen sliding scale   SubCutaneous every 6 hours    artificial  tears Solution 1 Drop(s) Left EYE four times a day  chlorhexidine 0.12% Liquid 15 milliLiter(s) Oral Mucosa every 12 hours  chlorhexidine 2% Cloths 1 Application(s) Topical daily  darbepoetin Injectable ViaL 60 MICROGram(s) SubCutaneous once  dextrose 5%. 1000 milliLiter(s) IV Continuous <Continuous>  dextrose 5%. 1000 milliLiter(s) IV Continuous <Continuous>  petrolatum Ophthalmic Ointment 1 Application(s) Left EYE at bedtime      PAST MEDICAL/SURGICAL HISTORY  PAST MEDICAL & SURGICAL HISTORY:  Hyperlipemia      Hypertension      Coronary Artery Disease      Diabetes Mellitus Type II      Stented Coronary Artery  total 5 stents, last stent 5/2019      Neuropathy      Myocardial infarction      Stroke  mild left facial numbness   no other residuals verbalized      Myoclonic jerking      Stage 3 chronic kidney disease      History of Cataract Extraction      Hx of CABG      H/O coronary angiogram      S/P coronary artery stent placement  1/6/09      S/P placement of cardiac pacemaker          SOCIAL HISTORY: Substance Use (street drugs): ( x ) never used  (  ) other:    FAMILY HISTORY:  No pertinent family history in first degree relatives        PHYSICAL EXAM:  T(C): 36.5 (03-11-24 @ 04:00), Max: 36.7 (03-10-24 @ 17:55)  HR: 78 (03-11-24 @ 07:51) (75 - 81)  BP: 137/79 (03-11-24 @ 04:00) (124/52 - 137/79)  RR: 22 (03-11-24 @ 04:00) (14 - 22)  SpO2: 98% (03-11-24 @ 07:51) (98% - 100%)  Wt(kg): --  I&O's Summary    10 Mar 2024 07:01  -  11 Mar 2024 07:00  --------------------------------------------------------  IN: 1700 mL / OUT: 10 mL / NET: 1690 mL            GENERAL: s/p trach  EYES: conjunctiva and sclera clear  ENMT:   Moist mucous membranes, Good dentition, No lesions  Cardiovascular: Normal S1 S2, No JVD, No murmurs, No edema  Respiratory: Lungs clear to auscultation	  Gastrointestinal:  s/p PEG   Extremities: no edema                                        7.9    6.56  )-----------( 313      ( 11 Mar 2024 05:05 )             23.8     03-11    138  |  96<L>  |  49<H>  ----------------------------<  203<H>  4.7   |  31  |  1.44<H>    Ca    8.8      11 Mar 2024 05:05  Phos  4.0     03-11  Mg     2.40     03-11      proBNP:   Lipid Profile:   HgA1c:   TSH:     Consultant(s) Notes Reviewed:  [x ] YES  [ ] NO    Care Discussed with Consultants/Other Providers [ x] YES  [ ] NO    Imaging Personally Reviewed independently:  [x] YES  [ ] NO    All labs, radiologic studies, vitals, orders and medications list reviewed. Patient is seen and examined at bedside. Case discussed with medical team.

## 2024-03-11 NOTE — BH CONSULTATION LIAISON ASSESSMENT NOTE - HPI (INCLUDE ILLNESS QUALITY, SEVERITY, DURATION, TIMING, CONTEXT, MODIFYING FACTORS, ASSOCIATED SIGNS AND SYMPTOMS)
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 Patient states that he has been having a dry cough for the past week, worsened over the past few days. Denies SOB with the cough, denies hemoptysis. Reports fevers at home to 101.5 with associated diaphoresis. Said that he has significant pinprick like b/l chest pain with his cough but denies any chest pain when not coughing or with ambulation. Also reports rhinorrhea and sore throat. Reports generalized weakness and poor appetite. States his daughter was visiting him over the week end last week and she was positive for COVID19. Patient tested positive for COVID19 2 days prior and was started on paxlovid as outpatient. Of note, family states that the patient has had worsening confusion at home over the past 6 months (becoming disoriented to time) but recently has been more confused, talking about seeing people that are not present.   Prolonged hospital course, has been admitted since 12/23/23. Currently being treated for multiple active medical issues. Psychiatry consulted for management of agitation.   Patient was seen with his son at bedside who provided much of the history. The patient has no known psychiatric history, reportedly started becoming agitated late last week. Had some sleep disturbances starting on thursday and became more active, started on ativan PRN for agitation. On saturday, the patient was severely agitated, pulling out his trach and IV lines, kicking family. Required multiple PO and IV PRNs of ativan. Then reportedly became over sedated sleeping all day on sunday and minimally interactive.   On exam patient attends to interviewer and son with his eyes, nods his head to some questions but it mostly uncooperative with exam and appears somewhat delirious and confused.   Physical exam notable for cogwheel rigidity of UEs. No evidence of tremor. Nonverbal due to trach.

## 2024-03-11 NOTE — PROGRESS NOTE ADULT - SUBJECTIVE AND OBJECTIVE BOX
NEPHROLOGY-[facility] [place] [phone]        Patient seen and examined trach to vent, agitated over the weekend remains on 1:1.         MEDICATIONS  (STANDING):  albuterol/ipratropium for Nebulization 3 milliLiter(s) Nebulizer every 12 hours  artificial  tears Solution 1 Drop(s) Left EYE four times a day  aspirin  chewable 81 milliGRAM(s) Enteral Tube daily  carvedilol 6.25 milliGRAM(s) Oral every 12 hours  chlorhexidine 0.12% Liquid 15 milliLiter(s) Oral Mucosa every 12 hours  chlorhexidine 2% Cloths 1 Application(s) Topical daily  darbepoetin Injectable ViaL 60 MICROGram(s) SubCutaneous once  dextrose 5%. 1000 milliLiter(s) (100 mL/Hr) IV Continuous <Continuous>  dextrose 5%. 1000 milliLiter(s) (50 mL/Hr) IV Continuous <Continuous>  dextrose 50% Injectable 25 Gram(s) IV Push once  dextrose 50% Injectable 25 Gram(s) IV Push once  dextrose 50% Injectable 12.5 Gram(s) IV Push once  doxazosin 2 milliGRAM(s) Oral at bedtime  escitalopram Solution 10 milliGRAM(s) Oral daily  furosemide    Tablet 20 milliGRAM(s) Oral daily  glucagon  Injectable 1 milliGRAM(s) IntraMuscular once  heparin   Injectable 5000 Unit(s) SubCutaneous every 8 hours  hydrALAZINE 50 milliGRAM(s) Oral every 8 hours  insulin glargine Injectable (LANTUS) 14 Unit(s) SubCutaneous <User Schedule>  insulin lispro (ADMELOG) corrective regimen sliding scale   SubCutaneous every 6 hours  levETIRAcetam  Solution 500 milliGRAM(s) Oral two times a day  melatonin 6 milliGRAM(s) Oral at bedtime  pantoprazole   Suspension 40 milliGRAM(s) Oral every 12 hours  petrolatum Ophthalmic Ointment 1 Application(s) Left EYE at bedtime  polyethylene glycol 3350 17 Gram(s) Oral daily  senna Syrup 10 milliLiter(s) Oral at bedtime  sucralfate suspension 1 Gram(s) Enteral Tube two times a day  ticagrelor 60 milliGRAM(s) Oral every 12 hours      VITAL:  T(C): , Max: 36.7 (03-10-24 @ 17:55)  T(F): , Max: 98.1 (03-10-24 @ 21:00)  HR: 78 (03-11-24 @ 07:51)  BP: 137/79 (03-11-24 @ 04:00)  BP(mean): --  RR: 22 (03-11-24 @ 04:00)  SpO2: 98% (03-11-24 @ 07:51)  Wt(kg): --    I and O's:    03-10 @ 07:01  -  03-11 @ 07:00  --------------------------------------------------------  IN: 1700 mL / OUT: 10 mL / NET: 1690 mL          PHYSICAL EXAM:  Constitutional: trach to vent   HEENT: +trach  Respiratory: coarse bs   Cardiovascular: normal s1s2  Gastrointestinal: BS+, soft, NT/ND +PEG  Extremities:+ peripheral edema  : + external catheter   Skin: No rashes    LABS:                        7.9    6.56  )-----------( 313      ( 11 Mar 2024 05:05 )             23.8     03-11    138  |  96<L>  |  49<H>  ----------------------------<  203<H>  4.7   |  31  |  1.44<H>    Ca    8.8      11 Mar 2024 05:05  Phos  4.0     03-11  Mg     2.40     03-11            Urine Studies:  Urinalysis Basic - ( 11 Mar 2024 05:05 )    Color: x / Appearance: x / SG: x / pH: x  Gluc: 203 mg/dL / Ketone: x  / Bili: x / Urobili: x   Blood: x / Protein: x / Nitrite: x   Leuk Esterase: x / RBC: x / WBC x   Sq Epi: x / Non Sq Epi: x / Bacteria: x            RADIOLOGY & ADDITIONAL STUDIES:  < from: Xray Abdomen 1 View PORTABLE -Routine (Xray Abdomen 1 View PORTABLE -Routine .) (03.10.24 @ 20:52) >  IMPRESSION:    Nonobstructive bowel gas pattern.    Small right pleural effusion.    --- End of Report ---        < end of copied text >      IMPRESSION:  89M w/ DM2, HTN, CVA, PAD, CKD3, and CAD-CABG, 12/23/23 p/w COVID19 infection, c/b respiratory failure/hypotension; now s/p tracheostomy    (1)Renal - CKD3; superimposed mild prerenal azotemia - numbers fluctuating based on hemodynamic status  (2)CV - now off pressors and midodrine, BP improved/stable    (3)Pulm - vent-dependent   (4)ID - afebrile, s/p zosyn   (5)GI - s/p PEG (2/20)  (6)Hypernatremia - possibly due to diuresis, Na+ improved     RECOMMEND:   (1)a/w lasix 20 mg daily   (2)DC free water and flush PEG as needed   (3)Continue meds for GFR 40-50ml/min  (4) give aranesp 60 mcg Sq x 1 (not given as ordered)       Wendi Alvarenga NP  [facility]   [phone]

## 2024-03-11 NOTE — BH CONSULTATION LIAISON ASSESSMENT NOTE - RISK ASSESSMENT
Low risk for suicide or violence at this time, does not require inpatient psychiatric admission.    1:1 CO for agitation.

## 2024-03-11 NOTE — BH CONSULTATION LIAISON ASSESSMENT NOTE - CURRENT MEDICATION
MEDICATIONS  (STANDING):  albuterol/ipratropium for Nebulization 3 milliLiter(s) Nebulizer every 12 hours  artificial  tears Solution 1 Drop(s) Left EYE four times a day  aspirin  chewable 81 milliGRAM(s) Enteral Tube daily  carvedilol 6.25 milliGRAM(s) Oral every 12 hours  chlorhexidine 0.12% Liquid 15 milliLiter(s) Oral Mucosa every 12 hours  chlorhexidine 2% Cloths 1 Application(s) Topical daily  darbepoetin Injectable ViaL 60 MICROGram(s) SubCutaneous once  dextrose 5%. 1000 milliLiter(s) (50 mL/Hr) IV Continuous <Continuous>  dextrose 5%. 1000 milliLiter(s) (100 mL/Hr) IV Continuous <Continuous>  dextrose 50% Injectable 25 Gram(s) IV Push once  dextrose 50% Injectable 12.5 Gram(s) IV Push once  dextrose 50% Injectable 25 Gram(s) IV Push once  doxazosin 2 milliGRAM(s) Oral at bedtime  escitalopram Solution 10 milliGRAM(s) Oral daily  furosemide    Tablet 20 milliGRAM(s) Oral daily  glucagon  Injectable 1 milliGRAM(s) IntraMuscular once  heparin   Injectable 5000 Unit(s) SubCutaneous every 8 hours  hydrALAZINE 50 milliGRAM(s) Oral every 8 hours  insulin glargine Injectable (LANTUS) 14 Unit(s) SubCutaneous <User Schedule>  insulin lispro (ADMELOG) corrective regimen sliding scale   SubCutaneous every 6 hours  levETIRAcetam  Solution 500 milliGRAM(s) Oral two times a day  melatonin 6 milliGRAM(s) Oral at bedtime  pantoprazole   Suspension 40 milliGRAM(s) Oral every 12 hours  petrolatum Ophthalmic Ointment 1 Application(s) Left EYE at bedtime  polyethylene glycol 3350 17 Gram(s) Oral daily  senna Syrup 10 milliLiter(s) Oral at bedtime  sucralfate suspension 1 Gram(s) Enteral Tube two times a day  ticagrelor 60 milliGRAM(s) Oral every 12 hours    MEDICATIONS  (PRN):  acetaminophen   Oral Liquid .. 650 milliGRAM(s) Oral every 6 hours PRN Temp greater or equal to 38C (100.4F), Mild Pain (1 - 3), Moderate Pain (4 - 6)  dextrose Oral Gel 15 Gram(s) Oral once PRN Blood Glucose LESS THAN 70 milliGRAM(s)/deciliter  LORazepam     Tablet 0.5 milliGRAM(s) Oral every 6 hours PRN Agitation  tranexamic acid Injectable for Nebulization 500 milliGRAM(s) Inhalation every 8 hours PRN hemoptysis

## 2024-03-12 LAB
ALBUMIN SERPL ELPH-MCNC: 3 G/DL — LOW (ref 3.3–5)
ALP SERPL-CCNC: 176 U/L — HIGH (ref 40–120)
ALT FLD-CCNC: 19 U/L — SIGNIFICANT CHANGE UP (ref 4–41)
ANION GAP SERPL CALC-SCNC: 10 MMOL/L — SIGNIFICANT CHANGE UP (ref 7–14)
AST SERPL-CCNC: 22 U/L — SIGNIFICANT CHANGE UP (ref 4–40)
BILIRUB DIRECT SERPL-MCNC: <0.2 MG/DL — SIGNIFICANT CHANGE UP (ref 0–0.3)
BILIRUB INDIRECT FLD-MCNC: >0.1 MG/DL — SIGNIFICANT CHANGE UP (ref 0–1)
BILIRUB SERPL-MCNC: 0.3 MG/DL — SIGNIFICANT CHANGE UP (ref 0.2–1.2)
BUN SERPL-MCNC: 50 MG/DL — HIGH (ref 7–23)
CALCIUM SERPL-MCNC: 9 MG/DL — SIGNIFICANT CHANGE UP (ref 8.4–10.5)
CHLORIDE SERPL-SCNC: 97 MMOL/L — LOW (ref 98–107)
CO2 SERPL-SCNC: 30 MMOL/L — SIGNIFICANT CHANGE UP (ref 22–31)
CREAT SERPL-MCNC: 1.5 MG/DL — HIGH (ref 0.5–1.3)
EGFR: 44 ML/MIN/1.73M2 — LOW
GLUCOSE BLDC GLUCOMTR-MCNC: 130 MG/DL — HIGH (ref 70–99)
GLUCOSE BLDC GLUCOMTR-MCNC: 138 MG/DL — HIGH (ref 70–99)
GLUCOSE BLDC GLUCOMTR-MCNC: 194 MG/DL — HIGH (ref 70–99)
GLUCOSE BLDC GLUCOMTR-MCNC: 233 MG/DL — HIGH (ref 70–99)
GLUCOSE SERPL-MCNC: 190 MG/DL — HIGH (ref 70–99)
HCT VFR BLD CALC: 25.5 % — LOW (ref 39–50)
HGB BLD-MCNC: 8 G/DL — LOW (ref 13–17)
MAGNESIUM SERPL-MCNC: 2.5 MG/DL — SIGNIFICANT CHANGE UP (ref 1.6–2.6)
MCHC RBC-ENTMCNC: 29.2 PG — SIGNIFICANT CHANGE UP (ref 27–34)
MCHC RBC-ENTMCNC: 31.4 GM/DL — LOW (ref 32–36)
MCV RBC AUTO: 93.1 FL — SIGNIFICANT CHANGE UP (ref 80–100)
NRBC # BLD: 0 /100 WBCS — SIGNIFICANT CHANGE UP (ref 0–0)
NRBC # FLD: 0 K/UL — SIGNIFICANT CHANGE UP (ref 0–0)
PHOSPHATE SERPL-MCNC: 3.8 MG/DL — SIGNIFICANT CHANGE UP (ref 2.5–4.5)
PLATELET # BLD AUTO: 340 K/UL — SIGNIFICANT CHANGE UP (ref 150–400)
POTASSIUM SERPL-MCNC: 4.7 MMOL/L — SIGNIFICANT CHANGE UP (ref 3.5–5.3)
POTASSIUM SERPL-SCNC: 4.7 MMOL/L — SIGNIFICANT CHANGE UP (ref 3.5–5.3)
PROT SERPL-MCNC: 7.2 G/DL — SIGNIFICANT CHANGE UP (ref 6–8.3)
RBC # BLD: 2.74 M/UL — LOW (ref 4.2–5.8)
RBC # FLD: 16.1 % — HIGH (ref 10.3–14.5)
SODIUM SERPL-SCNC: 137 MMOL/L — SIGNIFICANT CHANGE UP (ref 135–145)
WBC # BLD: 8.32 K/UL — SIGNIFICANT CHANGE UP (ref 3.8–10.5)
WBC # FLD AUTO: 8.32 K/UL — SIGNIFICANT CHANGE UP (ref 3.8–10.5)

## 2024-03-12 PROCEDURE — 99233 SBSQ HOSP IP/OBS HIGH 50: CPT | Mod: FS

## 2024-03-12 PROCEDURE — 99232 SBSQ HOSP IP/OBS MODERATE 35: CPT

## 2024-03-12 RX ORDER — DIVALPROEX SODIUM 500 MG/1
250 TABLET, DELAYED RELEASE ORAL
Refills: 0 | Status: DISCONTINUED | OUTPATIENT
Start: 2024-03-12 | End: 2024-03-13

## 2024-03-12 RX ORDER — LEVETIRACETAM 250 MG/1
250 TABLET, FILM COATED ORAL
Refills: 0 | Status: DISCONTINUED | OUTPATIENT
Start: 2024-03-12 | End: 2024-03-26

## 2024-03-12 RX ADMIN — Medication 3 MILLILITER(S): at 21:37

## 2024-03-12 RX ADMIN — Medication 2: at 05:13

## 2024-03-12 RX ADMIN — POLYETHYLENE GLYCOL 3350 17 GRAM(S): 17 POWDER, FOR SOLUTION ORAL at 11:08

## 2024-03-12 RX ADMIN — Medication 50 MILLIGRAM(S): at 13:02

## 2024-03-12 RX ADMIN — CHLORHEXIDINE GLUCONATE 1 APPLICATION(S): 213 SOLUTION TOPICAL at 11:10

## 2024-03-12 RX ADMIN — SENNA PLUS 10 MILLILITER(S): 8.6 TABLET ORAL at 21:14

## 2024-03-12 RX ADMIN — HEPARIN SODIUM 5000 UNIT(S): 5000 INJECTION INTRAVENOUS; SUBCUTANEOUS at 13:02

## 2024-03-12 RX ADMIN — Medication 1 DROP(S): at 17:25

## 2024-03-12 RX ADMIN — Medication 1 APPLICATION(S): at 21:12

## 2024-03-12 RX ADMIN — Medication 20 MILLIGRAM(S): at 05:16

## 2024-03-12 RX ADMIN — Medication 4: at 11:26

## 2024-03-12 RX ADMIN — Medication 1 DROP(S): at 05:14

## 2024-03-12 RX ADMIN — Medication 2 MILLIGRAM(S): at 21:14

## 2024-03-12 RX ADMIN — DIVALPROEX SODIUM 125 MILLIGRAM(S): 500 TABLET, DELAYED RELEASE ORAL at 05:16

## 2024-03-12 RX ADMIN — Medication 6 MILLIGRAM(S): at 21:14

## 2024-03-12 RX ADMIN — HEPARIN SODIUM 5000 UNIT(S): 5000 INJECTION INTRAVENOUS; SUBCUTANEOUS at 05:15

## 2024-03-12 RX ADMIN — Medication 1 GRAM(S): at 05:14

## 2024-03-12 RX ADMIN — Medication 1 DROP(S): at 23:44

## 2024-03-12 RX ADMIN — CARVEDILOL PHOSPHATE 6.25 MILLIGRAM(S): 80 CAPSULE, EXTENDED RELEASE ORAL at 17:24

## 2024-03-12 RX ADMIN — CHLORHEXIDINE GLUCONATE 15 MILLILITER(S): 213 SOLUTION TOPICAL at 05:15

## 2024-03-12 RX ADMIN — Medication 81 MILLIGRAM(S): at 11:10

## 2024-03-12 RX ADMIN — HEPARIN SODIUM 5000 UNIT(S): 5000 INJECTION INTRAVENOUS; SUBCUTANEOUS at 21:14

## 2024-03-12 RX ADMIN — TICAGRELOR 60 MILLIGRAM(S): 90 TABLET ORAL at 05:15

## 2024-03-12 RX ADMIN — ESCITALOPRAM OXALATE 10 MILLIGRAM(S): 10 TABLET, FILM COATED ORAL at 11:07

## 2024-03-12 RX ADMIN — CHLORHEXIDINE GLUCONATE 15 MILLILITER(S): 213 SOLUTION TOPICAL at 17:22

## 2024-03-12 RX ADMIN — DIVALPROEX SODIUM 250 MILLIGRAM(S): 500 TABLET, DELAYED RELEASE ORAL at 17:23

## 2024-03-12 RX ADMIN — Medication 1 DROP(S): at 11:27

## 2024-03-12 RX ADMIN — Medication 0.5 MILLIGRAM(S): at 16:18

## 2024-03-12 RX ADMIN — INSULIN GLARGINE 14 UNIT(S): 100 INJECTION, SOLUTION SUBCUTANEOUS at 11:27

## 2024-03-12 RX ADMIN — PANTOPRAZOLE SODIUM 40 MILLIGRAM(S): 20 TABLET, DELAYED RELEASE ORAL at 05:15

## 2024-03-12 RX ADMIN — LEVETIRACETAM 500 MILLIGRAM(S): 250 TABLET, FILM COATED ORAL at 05:15

## 2024-03-12 RX ADMIN — Medication 1 GRAM(S): at 17:22

## 2024-03-12 RX ADMIN — PANTOPRAZOLE SODIUM 40 MILLIGRAM(S): 20 TABLET, DELAYED RELEASE ORAL at 17:22

## 2024-03-12 RX ADMIN — TICAGRELOR 60 MILLIGRAM(S): 90 TABLET ORAL at 17:23

## 2024-03-12 RX ADMIN — Medication 3 MILLILITER(S): at 07:34

## 2024-03-12 RX ADMIN — LEVETIRACETAM 500 MILLIGRAM(S): 250 TABLET, FILM COATED ORAL at 17:22

## 2024-03-12 NOTE — PROGRESS NOTE ADULT - SUBJECTIVE AND OBJECTIVE BOX
CHIEF COMPLAINT: Patient is a 89y old  Male who presents with a chief complaint of COVID19, Chest pain (11 Mar 2024 13:54)      INTERVAL EVENTS:   - Started on Depakote 125 mg BID by Psychiatry for agitation/ delirium   - Received additional 0.25 mg Ativan for agitation/ pulling at trachea overnight     ROS: Seen by bedside during AM rounds     OBJECTIVE:  ICU Vital Signs Last 24 Hrs  T(C): 36.4 (12 Mar 2024 04:00), Max: 37 (11 Mar 2024 11:51)  T(F): 97.6 (12 Mar 2024 04:00), Max: 98.6 (11 Mar 2024 11:51)  HR: 79 (12 Mar 2024 04:00) (78 - 92)  BP: 125/52 (12 Mar 2024 04:00) (111/95 - 146/44)  BP(mean): --  ABP: --  ABP(mean): --  RR: 15 (12 Mar 2024 04:00) (15 - 23)  SpO2: 99% (12 Mar 2024 04:00) (95% - 100%)    O2 Parameters below as of 12 Mar 2024 04:00  Patient On (Oxygen Delivery Method): ventilator          Mode: AC/ CMV (Assist Control/ Continuous Mandatory Ventilation), RR (machine): 12, TV (machine): 450, FiO2: 30, PEEP: 5, PS: 10, MAP: 11, PIP: 26    03-11 @ 07:01  -  03-12 @ 07:00  --------------------------------------------------------  IN: 1450 mL / OUT: 20 mL / NET: 1430 mL      CAPILLARY BLOOD GLUCOSE      POCT Blood Glucose.: 194 mg/dL (12 Mar 2024 05:09)      PHYSICAL EXAM:  General:   HEENT:   Lymph Nodes:  Neck:   Respiratory:   Cardiovascular:   Abdomen:   Extremities:   Skin:   Neurological:  Psychiatry:    Mode: AC/ CMV (Assist Control/ Continuous Mandatory Ventilation)  RR (machine): 12  TV (machine): 450  FiO2: 30  PEEP: 5  PS: 10  MAP: 11  PIP: 26      HOSPITAL MEDICATIONS:  MEDICATIONS  (STANDING):  albuterol/ipratropium for Nebulization 3 milliLiter(s) Nebulizer every 12 hours  artificial  tears Solution 1 Drop(s) Left EYE four times a day  aspirin  chewable 81 milliGRAM(s) Enteral Tube daily  carvedilol 6.25 milliGRAM(s) Oral every 12 hours  chlorhexidine 0.12% Liquid 15 milliLiter(s) Oral Mucosa every 12 hours  chlorhexidine 2% Cloths 1 Application(s) Topical daily  dextrose 5%. 1000 milliLiter(s) (50 mL/Hr) IV Continuous <Continuous>  dextrose 5%. 1000 milliLiter(s) (100 mL/Hr) IV Continuous <Continuous>  dextrose 50% Injectable 25 Gram(s) IV Push once  dextrose 50% Injectable 25 Gram(s) IV Push once  dextrose 50% Injectable 12.5 Gram(s) IV Push once  divalproex Sprinkle 125 milliGRAM(s) Oral two times a day  doxazosin 2 milliGRAM(s) Oral at bedtime  escitalopram Solution 10 milliGRAM(s) Oral daily  furosemide    Tablet 20 milliGRAM(s) Oral daily  glucagon  Injectable 1 milliGRAM(s) IntraMuscular once  heparin   Injectable 5000 Unit(s) SubCutaneous every 8 hours  hydrALAZINE 50 milliGRAM(s) Oral every 8 hours  insulin glargine Injectable (LANTUS) 14 Unit(s) SubCutaneous <User Schedule>  insulin lispro (ADMELOG) corrective regimen sliding scale   SubCutaneous every 6 hours  levETIRAcetam  Solution 500 milliGRAM(s) Oral two times a day  melatonin 6 milliGRAM(s) Oral at bedtime  pantoprazole   Suspension 40 milliGRAM(s) Oral every 12 hours  petrolatum Ophthalmic Ointment 1 Application(s) Left EYE at bedtime  polyethylene glycol 3350 17 Gram(s) Oral daily  senna Syrup 10 milliLiter(s) Oral at bedtime  sucralfate suspension 1 Gram(s) Enteral Tube two times a day  ticagrelor 60 milliGRAM(s) Oral every 12 hours    MEDICATIONS  (PRN):  acetaminophen   Oral Liquid .. 650 milliGRAM(s) Oral every 6 hours PRN Temp greater or equal to 38C (100.4F), Mild Pain (1 - 3), Moderate Pain (4 - 6)  dextrose Oral Gel 15 Gram(s) Oral once PRN Blood Glucose LESS THAN 70 milliGRAM(s)/deciliter  LORazepam     Tablet 0.5 milliGRAM(s) Oral every 6 hours PRN Agitation  tranexamic acid Injectable for Nebulization 500 milliGRAM(s) Inhalation every 8 hours PRN hemoptysis      LABS:                        8.0    8.32  )-----------( 340      ( 12 Mar 2024 05:30 )             25.5     03-12    137  |  97<L>  |  50<H>  ----------------------------<  190<H>  4.7   |  30  |  1.50<H>    Ca    9.0      12 Mar 2024 05:30  Phos  3.8     03-12  Mg     2.50     03-12    TPro  7.2  /  Alb  3.0<L>  /  TBili  0.3  /  DBili  <0.2  /  AST  22  /  ALT  19  /  AlkPhos  176<H>  03-12      Urinalysis Basic - ( 12 Mar 2024 05:30 )    Color: x / Appearance: x / SG: x / pH: x  Gluc: 190 mg/dL / Ketone: x  / Bili: x / Urobili: x   Blood: x / Protein: x / Nitrite: x   Leuk Esterase: x / RBC: x / WBC x   Sq Epi: x / Non Sq Epi: x / Bacteria: x         CHIEF COMPLAINT: Patient is a 89y old  Male who presents with a chief complaint of COVID19, Chest pain (11 Mar 2024 13:54)      INTERVAL EVENTS:   - Started on Depakote 125 mg BID by Psychiatry for agitation/ delirium   - Received additional 0.25 mg Ativan for agitation/ pulling at trachea overnight     ROS: Seen by bedside during AM rounds. Unable to obtain ROS.    OBJECTIVE:  ICU Vital Signs Last 24 Hrs  T(C): 36.4 (12 Mar 2024 04:00), Max: 37 (11 Mar 2024 11:51)  T(F): 97.6 (12 Mar 2024 04:00), Max: 98.6 (11 Mar 2024 11:51)  HR: 79 (12 Mar 2024 04:00) (78 - 92)  BP: 125/52 (12 Mar 2024 04:00) (111/95 - 146/44)  BP(mean): --  ABP: --  ABP(mean): --  RR: 15 (12 Mar 2024 04:00) (15 - 23)  SpO2: 99% (12 Mar 2024 04:00) (95% - 100%)    O2 Parameters below as of 12 Mar 2024 04:00  Patient On (Oxygen Delivery Method): ventilator          Mode: AC/ CMV (Assist Control/ Continuous Mandatory Ventilation), RR (machine): 12, TV (machine): 450, FiO2: 30, PEEP: 5, PS: 10, MAP: 11, PIP: 26    03-11 @ 07:01  -  03-12 @ 07:00  --------------------------------------------------------  IN: 1450 mL / OUT: 20 mL / NET: 1430 mL      CAPILLARY BLOOD GLUCOSE      POCT Blood Glucose.: 194 mg/dL (12 Mar 2024 05:09)        Mode: AC/ CMV (Assist Control/ Continuous Mandatory Ventilation)  RR (machine): 12  TV (machine): 450  FiO2: 30  PEEP: 5  PS: 10  MAP: 11  PIP: 26      HOSPITAL MEDICATIONS:  MEDICATIONS  (STANDING):  albuterol/ipratropium for Nebulization 3 milliLiter(s) Nebulizer every 12 hours  artificial  tears Solution 1 Drop(s) Left EYE four times a day  aspirin  chewable 81 milliGRAM(s) Enteral Tube daily  carvedilol 6.25 milliGRAM(s) Oral every 12 hours  chlorhexidine 0.12% Liquid 15 milliLiter(s) Oral Mucosa every 12 hours  chlorhexidine 2% Cloths 1 Application(s) Topical daily  dextrose 5%. 1000 milliLiter(s) (50 mL/Hr) IV Continuous <Continuous>  dextrose 5%. 1000 milliLiter(s) (100 mL/Hr) IV Continuous <Continuous>  dextrose 50% Injectable 25 Gram(s) IV Push once  dextrose 50% Injectable 25 Gram(s) IV Push once  dextrose 50% Injectable 12.5 Gram(s) IV Push once  divalproex Sprinkle 125 milliGRAM(s) Oral two times a day  doxazosin 2 milliGRAM(s) Oral at bedtime  escitalopram Solution 10 milliGRAM(s) Oral daily  furosemide    Tablet 20 milliGRAM(s) Oral daily  glucagon  Injectable 1 milliGRAM(s) IntraMuscular once  heparin   Injectable 5000 Unit(s) SubCutaneous every 8 hours  hydrALAZINE 50 milliGRAM(s) Oral every 8 hours  insulin glargine Injectable (LANTUS) 14 Unit(s) SubCutaneous <User Schedule>  insulin lispro (ADMELOG) corrective regimen sliding scale   SubCutaneous every 6 hours  levETIRAcetam  Solution 500 milliGRAM(s) Oral two times a day  melatonin 6 milliGRAM(s) Oral at bedtime  pantoprazole   Suspension 40 milliGRAM(s) Oral every 12 hours  petrolatum Ophthalmic Ointment 1 Application(s) Left EYE at bedtime  polyethylene glycol 3350 17 Gram(s) Oral daily  senna Syrup 10 milliLiter(s) Oral at bedtime  sucralfate suspension 1 Gram(s) Enteral Tube two times a day  ticagrelor 60 milliGRAM(s) Oral every 12 hours    MEDICATIONS  (PRN):  acetaminophen   Oral Liquid .. 650 milliGRAM(s) Oral every 6 hours PRN Temp greater or equal to 38C (100.4F), Mild Pain (1 - 3), Moderate Pain (4 - 6)  dextrose Oral Gel 15 Gram(s) Oral once PRN Blood Glucose LESS THAN 70 milliGRAM(s)/deciliter  LORazepam     Tablet 0.5 milliGRAM(s) Oral every 6 hours PRN Agitation  tranexamic acid Injectable for Nebulization 500 milliGRAM(s) Inhalation every 8 hours PRN hemoptysis      LABS:                        8.0    8.32  )-----------( 340      ( 12 Mar 2024 05:30 )             25.5     03-12    137  |  97<L>  |  50<H>  ----------------------------<  190<H>  4.7   |  30  |  1.50<H>    Ca    9.0      12 Mar 2024 05:30  Phos  3.8     03-12  Mg     2.50     03-12    TPro  7.2  /  Alb  3.0<L>  /  TBili  0.3  /  DBili  <0.2  /  AST  22  /  ALT  19  /  AlkPhos  176<H>  03-12      Urinalysis Basic - ( 12 Mar 2024 05:30 )    Color: x / Appearance: x / SG: x / pH: x  Gluc: 190 mg/dL / Ketone: x  / Bili: x / Urobili: x   Blood: x / Protein: x / Nitrite: x   Leuk Esterase: x / RBC: x / WBC x   Sq Epi: x / Non Sq Epi: x / Bacteria: x

## 2024-03-12 NOTE — PROGRESS NOTE ADULT - ASSESSMENT
IMPRESSION:  89M w/ DM2, HTN, CVA, PAD, CKD3, and CAD-CABG, 12/23/23 p/w COVID19 infection, c/b respiratory failure/hypotension; now s/p tracheostomy    (1)Renal - CKD3; superimposed mild prerenal azotemia - numbers fluctuating based on hemodynamic status, slightly higher   (2)CV - now off pressors and midodrine, BP improved/stable    (3)Pulm - vent-dependent   (4)ID - afebrile, s/p zosyn   (5)GI - s/p PEG (2/20)  (6)Hypernatremia - possibly due to diuresis, Na+ improved    RECOMMEND:   (1)a/w lasix 20 mg daily   (2)flush PEG as needed   (3)Continue meds for GFR 40-50ml/min  (4)S/p aranesp 60 mcg Sq x 1 yesterday    Faby Cummins DNP  [facility]  [phone]

## 2024-03-12 NOTE — PROGRESS NOTE ADULT - ASSESSMENT
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 Course ultimately complicated by progressive WOB and O2 demand and patient intubated and transferred to MICU (1/22). Course further complicated by acute cholecystitis and s/p Perc Lynsey with IR on (1/26), HFrEF 38, pulmonary edema, superimposed PNA, failed extubation failed and prolonged vent time and s/p trach (2/13). Patient transferred to RCU (2/16) and s/p PEG (2/20). Course complicated by volume overload and diuresis and GDMT continued and HFrEF 45 with improvement    NEUROLOGY   # Encephalopathy   - Hx of CVA with no residual deficits per family, however per family worsening confusion at home over the past 6 months (becoming disoriented to time) but prior to admission has been more confused, talking about seeing people that are not present.  - CTH (1/31) with no evidence of acute infarct  - Continue on ASA and Brilinta  - Holding Neurontin, Memantine and Oxycodone in setting of somnolence    # ICA stenosis   - CTA NECK (1/31) with moderate to severe narrowing left internal carotid at the origin. Severe narrowing right internal carotid by a tandem lesion 1.5 cm above the bifurcation with a narrowed internal carotid beyond the lesion. Extensive calcified plaque both cavernous and supraclinoid carotid arteries with narrowing but no occlusion. Mild narrowing proximal right M1 segment. Moderate narrowing both vertebral V4 segments. No perfusion abnormality at the Tmax 6 second threshold, but moderate hypoperfusion in the right MCA territory at the 4 second threshold.   - Continue on ASA and Brilinta    # Myoclonic jerks   - Hx of myoclonic jerks post CVAs   - EEG (1/18-2/6) negative for seizures  - Continue on Keppra and per neuro wishes to wean, however family wishes to keep current dose as home medication.     # Depression/ Insomnia  - Continue on Lexapro per home medication   - Course complicated by agitation and pulling on tubes   - Continue on Seroquel but QTC prolonged and now held   - Continue on Ativan 0.5mg PO Q6H PRN  - Supportive care     # Agitation in setting of delirium / underlying vascular dementia   - 3/11 Psychiatry consulted, started Depakote 125 mg PO BID   - monitor platelets, LFTs while on Depakote     CARDIOLOGY   # Vasoplegic vs septic shock  # Hx of HTN   - Weaned off pressors in ICU and now with mild hypertension   - Continue on coreg and hydral as below   - Strict BP control given moderate AS  - Increased vascular congestion on CXR (3/9) so will give additional Lasix 20mg PO x1 (3/10)    # AFIB RVR   # Bifascicular Block with [employer] PPM   - AFIB RVR episodes and PPM interrogated (2/20) by EP with similar episodes  - Previous admission in 6/2022 with cardiogenic syncope second to bifasicular block, however now with PPM and AV myron blockers ok.   - Continue on lopressor 12.5mg BID however replaced with coreg second to HFrEF   - AC discussed at length however with recent GIB will hold for now     # HFrEF 38 likely stress induced?   # Mild to moderate AS   - ECHO (12/2023) with EF 62 with normal LVSF and mild to moderate AS   - ECHO (2/2024) with EF 38, moderate LVSD with global LV hypokinesis, mildly reduced RVSF (TAPSE 1.3), mild to moderate MR, mild AR, and moderate AS.   - RPT ECHO (3/4) with EF 45 and mild to moderate LVSD   - Continue on lasix 20 QD, coreg 6.25 and hydralazine 50 (increased 3/4)   - Hold isordil and up titrate above medications first     # CAD with CABG   - CATH (5/2022) with dLAD 70, SVG graft to OM1 with 90 in stent stenosis s/p PCI and GEMMA placement, and LIMA graft to mLAD with no disease.   - Continue on ASA and brilinta    # Prolonged QTC   - ECG (3/2) with QTC 616ms (corrected using bazzet 490ms)   - ECG (3/3) with QTC 634ms (corrected using bazzet 490ms)   - ECG (3/11) with QTC 490ms   - Monitor off/ avoid QTC prolonging agents for now    RESPIRATORY   # Acute respiratory failure second to SARS-COV-2 vs pulm edema vs PNA  - Presented with COVID complicated by sepsis second to acute lynsey and worsening respiratory failure second to pulmonary edema requiring intubation.   - Prolonged intubation and s/p tracheostomy (2/13)   - CT CHEST 1/16 with new small bilateral pleural effusions/ atelectasis/ patchy bilateral ground-glass opacities are consistent with pulmonary edema.  - Completed ABX and COVID regimen as below   - Continue on vent and initially tolerating some SBT 10/5   - Continue on nebs and hold further HTS given intermittent hemoptysis  - Continue on diuresis as above    # Mild hemoptysis likely from suction trauma   - s/p bronch (2/18) with no active bleed  - Hemoptysis improved with TXA and holding further HTS as above   - Tiny hemoptysis second to suction trauma imporving  - Careful with suctioning   - Recurrent hemoptysis (3/9) so ordered for TXA nebs again however hemoptysis appeared self limited and stopped before nebs even given    HEENT   # L eye subconjunctival hemorrhage   - Continue on artifical tears and Lacrilube   - Optho eval appreciated     GI  # Nutrition/ Dysphagia   - PEG placed by GI on 2/20 and tube feeds continued.   - Loose stools and Banatrol continued     # UGIB   - EGD (1/2) with esophagitis and bleeding Dieulafoy's lesion s/p clips.   - Continue on PPI and carafate     # SMA calcification   - CTA demonstrating mesenteric fat stranding associated with ascending/transverse colon  - Diagnostic laparoscopy (12/24) with small bowel and visible colon viable, some inflammation of omentum in RUQ  - SMA Angiogram and stent placed (12/25).   - Continue on ASA and Brilinta     # Acute Cholecystitis   - CT (12/26) with distended GB with cholelithiasis and sludge   - HIDA (12/29) POSITIVE for acute cholecystitis   - Case discussed with IR at length and s/p PERC LYNSEY (1/26)   - Completed zosyn course (12/24-12/31)   - PERC LYNSEY tube to stay in until surgical candidate for cholecystectomy to prevent recurrence     / RENAL   # CKD   - CRE at baseline   - Monitor renal function and UOP   - Aranesp SQ x1 (3/8)    # Hypernatremia (resolved)   - s/p  Q4H  - Monitor closely with diuresis as above       INFECTIOUS DISEASE   # COVID with superimposed PNA   # ESBL Kleb PNA   - COVID POSITIVE (12/23) and completed remdesivir x 1 dose and paxlovid course.   - WOB worsened as above requiring intubation and cultures negative. Zosyn completed empirically however hypothermic (2/11) and BCx, UA, RVP and BAL negative.   - Completed additional zosyn (2/12-2/19) empirically   - Fever spike and thick secretions and SCX (2/26) with ESBL klebs.   - s/p Zosyn (2/27 - 2/29) but resistant and completed Erta (2/29 - 3/6)     HEME  # AOCD   - Monitor HH   - DVT PPX with HSQ     ENDOCRINE   # DM2 A1C 9.3   - Continue on Lantus 14 and ISS     SKIN/ TUBES   - Trach (2/13)   - PEG (2/20)   - PERC LYNSEY (1/26 - )     ETHICS   - FULL CODE     DISPO - vent facility and family aware. SW with accepting facility however pending available bed.

## 2024-03-12 NOTE — BH CONSULTATION LIAISON PROGRESS NOTE - NSBHASSESSMENTFT_PSY_ALL_CORE
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 Course ultimately complicated by progressive WOB and O2 demand and patient intubated and transferred to MICU (1/22). Course further complicated by acute cholecystitis and s/p Perc Lynsey with IR on (1/26), HFrEF 38, pulmonary edema, superimposed PNA, failed extubation failed and prolonged vent time and s/p trach (2/13). Patient transferred to RCU (2/16) and s/p PEG (2/20). Course complicated by volume overload and diuresis and GDMT continued and HFrEF 45 with improvement.  No known psychiatric history. Psychiatry consulted for agitation. He has been receiving PO and IM ativan several times per day, concern for oversedation. Son and DIL are physicians and requested consult.     Due to multiple medical comorbities and prolonged QTc, would hold off on antipsychotics and other QT prolonging agents.   Ativan is not an ideal PRN for this patient given deliriogenic effects and paradoxical activation.  Discussed with family plan to start low dose depakote for impulsivity and agitation and uptitrated as tolerated.    3/12: received ativan x2 overnight for ongoing agitation. LFTs wnl. Will continue to increase depakote and monitor. Would recommend cross tapering with keppra as keppra may be contributing to agitated delirium. Discussed with family.     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 Course ultimately complicated by progressive WOB and O2 demand and patient intubated and transferred to MICU (1/22). Course further complicated by acute cholecystitis and s/p Perc Lynsey with IR on (1/26), HFrEF 38, pulmonary edema, superimposed PNA, failed extubation failed and prolonged vent time and s/p trach (2/13). Patient transferred to RCU (2/16) and s/p PEG (2/20). Course complicated by volume overload and diuresis and GDMT continued and HFrEF 45 with improvement.  No known psychiatric history. Psychiatry consulted for agitation. He has been receiving PO and IM ativan several times per day, concern for oversedation. Son and DIL are physicians and requested consult.     Due to multiple medical comorbities and prolonged QTc, would hold off on antipsychotics and other QT prolonging agents.   Ativan is not an ideal PRN for this patient given deliriogenic effects and paradoxical activation.  Discussed with family plan to start low dose depakote for impulsivity and agitation and uptitrated as tolerated.    3/12: received ativan x2 overnight for ongoing agitation. LFTs wnl. Will continue to increase depakote and monitor. Would recommend cross tapering with keppra as keppra may be contributing to agitated delirium. Discussed with family.     Plan:  - INCREASE depakote to 250mg PO BID for impulsivity and agitation  - would begin to taper keppra and utilize Depakote for both Keppra indications and impulsivity.  - can use ativan 0.5 mg IV for breakthrough severe agitation, would try to avoid  - delirium precautions   - monitor LFTs, platelets, ammonia, VPA level

## 2024-03-12 NOTE — PROGRESS NOTE ADULT - SUBJECTIVE AND OBJECTIVE BOX
NEPHROLOGY     Patient seen and examined with staff at bedside, pt trach to shalini, no acute distress.     MEDICATIONS  (STANDING):  albuterol/ipratropium for Nebulization 3 milliLiter(s) Nebulizer every 12 hours  artificial  tears Solution 1 Drop(s) Left EYE four times a day  aspirin  chewable 81 milliGRAM(s) Enteral Tube daily  carvedilol 6.25 milliGRAM(s) Oral every 12 hours  chlorhexidine 0.12% Liquid 15 milliLiter(s) Oral Mucosa every 12 hours  chlorhexidine 2% Cloths 1 Application(s) Topical daily  dextrose 5%. 1000 milliLiter(s) (100 mL/Hr) IV Continuous <Continuous>  dextrose 5%. 1000 milliLiter(s) (50 mL/Hr) IV Continuous <Continuous>  dextrose 50% Injectable 25 Gram(s) IV Push once  dextrose 50% Injectable 25 Gram(s) IV Push once  dextrose 50% Injectable 12.5 Gram(s) IV Push once  divalproex Sprinkle 125 milliGRAM(s) Oral two times a day  doxazosin 2 milliGRAM(s) Oral at bedtime  escitalopram Solution 10 milliGRAM(s) Oral daily  furosemide    Tablet 20 milliGRAM(s) Oral daily  glucagon  Injectable 1 milliGRAM(s) IntraMuscular once  heparin   Injectable 5000 Unit(s) SubCutaneous every 8 hours  hydrALAZINE 50 milliGRAM(s) Oral every 8 hours  insulin glargine Injectable (LANTUS) 14 Unit(s) SubCutaneous <User Schedule>  insulin lispro (ADMELOG) corrective regimen sliding scale   SubCutaneous every 6 hours  levETIRAcetam  Solution 500 milliGRAM(s) Oral two times a day  melatonin 6 milliGRAM(s) Oral at bedtime  pantoprazole   Suspension 40 milliGRAM(s) Oral every 12 hours  petrolatum Ophthalmic Ointment 1 Application(s) Left EYE at bedtime  polyethylene glycol 3350 17 Gram(s) Oral daily  senna Syrup 10 milliLiter(s) Oral at bedtime  sucralfate suspension 1 Gram(s) Enteral Tube two times a day  ticagrelor 60 milliGRAM(s) Oral every 12 hours    VITALS:  T(C): , Max: 37 (03-11-24 @ 11:51)  T(F): , Max: 98.6 (03-11-24 @ 11:51)  HR: 95 (03-12-24 @ 11:30)  BP: 113/52 (03-12-24 @ 09:00)  BP(mean): 57 (03-12-24 @ 09:00)  RR: 18 (03-12-24 @ 09:00)  SpO2: 97% (03-12-24 @ 11:30)  Wt(kg): --  I and O's:    03-11 @ 07:01  -  03-12 @ 07:00  --------------------------------------------------------  IN: 1450 mL / OUT: 20 mL / NET: 1430 mL    PHYSICAL EXAM:  Constitutional: trach to vent   HEENT: +trach  Respiratory: coarse bs   Cardiovascular: normal s1s2  Gastrointestinal: BS+, soft, NT/ND +PEG  Extremities:+ peripheral edema  : + external catheter   Skin: No rashes    LABS:                        8.0    8.32  )-----------( 340      ( 12 Mar 2024 05:30 )             25.5     03-12    137  |  97<L>  |  50<H>  ----------------------------<  190<H>  4.7   |  30  |  1.50<H>    Ca    9.0      12 Mar 2024 05:30  Phos  3.8     03-12  Mg     2.50     03-12    TPro  7.2  /  Alb  3.0<L>  /  TBili  0.3  /  DBili  <0.2  /  AST  22  /  ALT  19  /  AlkPhos  176<H>  03-12      Urine Studies:  Urinalysis Basic - ( 12 Mar 2024 05:30 )    Color: x / Appearance: x / SG: x / pH: x  Gluc: 190 mg/dL / Ketone: x  / Bili: x / Urobili: x   Blood: x / Protein: x / Nitrite: x   Leuk Esterase: x / RBC: x / WBC x   Sq Epi: x / Non Sq Epi: x / Bacteria: x    RADIOLOGY & ADDITIONAL STUDIES:    rad< from: US Duplex Venous Lower Ext Complete, Bilateral (03.11.24 @ 19:29) >  IMPRESSION:  No evidence of deep venous thrombosis in either lower extremity.    Increased venous pulsatility suggestive of right-sided cardiac   dysfunction.        --- End of Report ---      ADEBAYO PALACIO MD; Attending Radiologist  This document has been electronically signed. Mar 11 2024  7:37PM    < end of copied text >

## 2024-03-12 NOTE — PROGRESS NOTE ADULT - NS ATTEND AMEND GEN_ALL_CORE FT
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     Pt with persistent encephalopathy with intermittent episodes of confusion and disorientation with trach/ling pulling in the setting of prolonged hospitalization with critical illness. CT Head (1/31) without acute pathologic anatomy. EEG (2/6) without epileptiform discharges. On ASA/Brilinta from prior hx CVAs (without known neurologic deficits) with known hx severe b/l internal carotid narrowing without occlusions. On home SSRI, but pt continues to have periods of agitation with line/trach pulling unable to receive Seroquel 2/2 prolonged QTc. Limiting benzos. Psych consulted over the weekend for further assistance, initiated on small dose depakote with plan to uptitrate as tolerated. Family would like to attempt weaning of keppra, as only in place as home medication for tremors/jerking. Pt further remains physically weak and debilitated in the setting of prolonged critical illness with polyneuropathy.     Pt with cardiac hz as above now hemodynamically stable off vasopressors, on DMT as tolerated. TTE 2/24 with new biventricular failure with EF 38% and moderate MR/AS, repeat TTE 3/4 showing improved LVSF. Will c/w lasix + carvedilol + hydralazine + statin, holding isordil for now with uptitration of current medications first. Will c/w ASA/Brilinta (GEMMA 5/22). Cardiology following, recs appreciated.    Pt with acute hypoxemic respiratory failure 2/2 COVID19 +/- flash pulmonary edema with severe sepsis further c/b acute cholecystitis requiring ETT intubation/MV with failure to wean from ventilator s/p tracheostomy placement (2/13). Will c/w SBT trials as tolerated. Airway clearance therapy in place. Trach care and suctioning as per RCU team. Oral hygiene and aspiration precautions in place. Hemoptysis earlier in admission now resolved.     Pt with oropharyngeal dysphagia s/p PEG placement (GI, 2/20) now tolerating feeds at goal rate. Hospital course c/b acute cholecystitis s/p percutaneous asa (1/26), will remain until cholecystectomy. Abx course completed. SMA calcification initially found on CTA now s/p laparoscopy (12/24) with SMA angiogram and stent placement (12/25.) Will c/w DAPT. UGIB 2/2 esophagitis with bleeding Dieulafoy lesion s/p EGD (1/2) with clips. Will c/w PPI and Carafate. Hypernatremia improved on free water. DMII well controlled on basal/bolus insulin with ISS and BGFS monitoring.     Pt with hospital course c/b COVID with superimposed PNA s/p tx followed by ESBL Klebsiella PNA s/p Zosyn followed by broadening to Ertapenem through 3/6. Now off Abx. Will CTM conservatively with low threshold to restart abx.     Dispo pending medical optimization. Pt full code. DVT ppx HSQ. Plan for dispo to LTCF with ventilator capabilities. Will continue to update pt and family throughout hospitalization.

## 2024-03-12 NOTE — PROGRESS NOTE ADULT - SUBJECTIVE AND OBJECTIVE BOX
Aashish Boyer MD  Interventional Cardiology / Endovascular Specialist  [place] Office : [phone] [place] [id number] Tel:   [place] Office : [address] [facility] [address]  Tel: [phone]      Subjective/Overnight events: Patient lying in bed. No acute distress.   	  MEDICATIONS:  aspirin  chewable 81 milliGRAM(s) Enteral Tube daily  carvedilol 6.25 milliGRAM(s) Oral every 12 hours  doxazosin 2 milliGRAM(s) Oral at bedtime  furosemide    Tablet 20 milliGRAM(s) Oral daily  heparin   Injectable 5000 Unit(s) SubCutaneous every 8 hours  hydrALAZINE 50 milliGRAM(s) Oral every 8 hours  ticagrelor 60 milliGRAM(s) Oral every 12 hours  tranexamic acid Injectable for Nebulization 500 milliGRAM(s) Inhalation every 8 hours PRN      albuterol/ipratropium for Nebulization 3 milliLiter(s) Nebulizer every 12 hours    acetaminophen   Oral Liquid .. 650 milliGRAM(s) Oral every 6 hours PRN  divalproex Sprinkle 125 milliGRAM(s) Oral two times a day  escitalopram Solution 10 milliGRAM(s) Oral daily  levETIRAcetam  Solution 500 milliGRAM(s) Oral two times a day  LORazepam     Tablet 0.5 milliGRAM(s) Oral every 6 hours PRN  melatonin 6 milliGRAM(s) Oral at bedtime    pantoprazole   Suspension 40 milliGRAM(s) Oral every 12 hours  polyethylene glycol 3350 17 Gram(s) Oral daily  senna Syrup 10 milliLiter(s) Oral at bedtime  sucralfate suspension 1 Gram(s) Enteral Tube two times a day    dextrose 50% Injectable 25 Gram(s) IV Push once  dextrose 50% Injectable 25 Gram(s) IV Push once  dextrose 50% Injectable 12.5 Gram(s) IV Push once  dextrose Oral Gel 15 Gram(s) Oral once PRN  glucagon  Injectable 1 milliGRAM(s) IntraMuscular once  insulin glargine Injectable (LANTUS) 14 Unit(s) SubCutaneous <User Schedule>  insulin lispro (ADMELOG) corrective regimen sliding scale   SubCutaneous every 6 hours    artificial  tears Solution 1 Drop(s) Left EYE four times a day  chlorhexidine 0.12% Liquid 15 milliLiter(s) Oral Mucosa every 12 hours  chlorhexidine 2% Cloths 1 Application(s) Topical daily  dextrose 5%. 1000 milliLiter(s) IV Continuous <Continuous>  dextrose 5%. 1000 milliLiter(s) IV Continuous <Continuous>  petrolatum Ophthalmic Ointment 1 Application(s) Left EYE at bedtime      PAST MEDICAL/SURGICAL HISTORY  PAST MEDICAL & SURGICAL HISTORY:  Hyperlipemia      Hypertension      Coronary Artery Disease      Diabetes Mellitus Type II      Stented Coronary Artery  total 5 stents, last stent 5/2019      Neuropathy      Myocardial infarction      Stroke  mild left facial numbness   no other residuals verbalized      Myoclonic jerking      Stage 3 chronic kidney disease      History of Cataract Extraction      Hx of CABG      H/O coronary angiogram      S/P coronary artery stent placement  1/6/09      S/P placement of cardiac pacemaker          SOCIAL HISTORY: Substance Use (street drugs): ( x ) never used  (  ) other:    FAMILY HISTORY:  No pertinent family history in first degree relatives          PHYSICAL EXAM:  T(C): 36.8 (03-12-24 @ 09:00), Max: 36.8 (03-12-24 @ 09:00)  HR: 95 (03-12-24 @ 11:30) (79 - 95)  BP: 113/52 (03-12-24 @ 09:00) (111/95 - 146/44)  RR: 18 (03-12-24 @ 09:00) (15 - 23)  SpO2: 97% (03-12-24 @ 11:30) (95% - 100%)  Wt(kg): --  I&O's Summary    11 Mar 2024 07:01  -  12 Mar 2024 07:00  --------------------------------------------------------  IN: 1450 mL / OUT: 20 mL / NET: 1430 mL      GENERAL: s/p trach  EYES: conjunctiva and sclera clear  ENMT:   Moist mucous membranes, Good dentition, No lesions  Cardiovascular: Normal S1 S2, No JVD, No murmurs, No edema  Respiratory: Lungs clear to auscultation	  Gastrointestinal:  s/p PEG   Extremities: no edema                                    8.0    8.32  )-----------( 340      ( 12 Mar 2024 05:30 )             25.5     03-12    137  |  97<L>  |  50<H>  ----------------------------<  190<H>  4.7   |  30  |  1.50<H>    Ca    9.0      12 Mar 2024 05:30  Phos  3.8     03-12  Mg     2.50     03-12    TPro  7.2  /  Alb  3.0<L>  /  TBili  0.3  /  DBili  <0.2  /  AST  22  /  ALT  19  /  AlkPhos  176<H>  03-12    proBNP:   Lipid Profile:   HgA1c:   TSH:     Consultant(s) Notes Reviewed:  [x ] YES  [ ] NO    Care Discussed with Consultants/Other Providers [ x] YES  [ ] NO    Imaging Personally Reviewed independently:  [x] YES  [ ] NO    All labs, radiologic studies, vitals, orders and medications list reviewed. Patient is seen and examined at bedside. Case discussed with medical team.

## 2024-03-13 LAB
ALBUMIN SERPL ELPH-MCNC: 2.6 G/DL — LOW (ref 3.3–5)
ALP SERPL-CCNC: 149 U/L — HIGH (ref 40–120)
ALT FLD-CCNC: 18 U/L — SIGNIFICANT CHANGE UP (ref 4–41)
ANION GAP SERPL CALC-SCNC: 8 MMOL/L — SIGNIFICANT CHANGE UP (ref 7–14)
AST SERPL-CCNC: 21 U/L — SIGNIFICANT CHANGE UP (ref 4–40)
BILIRUB DIRECT SERPL-MCNC: <0.2 MG/DL — SIGNIFICANT CHANGE UP (ref 0–0.3)
BILIRUB INDIRECT FLD-MCNC: >0.1 MG/DL — SIGNIFICANT CHANGE UP (ref 0–1)
BILIRUB SERPL-MCNC: 0.3 MG/DL — SIGNIFICANT CHANGE UP (ref 0.2–1.2)
BUN SERPL-MCNC: 49 MG/DL — HIGH (ref 7–23)
CALCIUM SERPL-MCNC: 8.6 MG/DL — SIGNIFICANT CHANGE UP (ref 8.4–10.5)
CHLORIDE SERPL-SCNC: 99 MMOL/L — SIGNIFICANT CHANGE UP (ref 98–107)
CO2 SERPL-SCNC: 32 MMOL/L — HIGH (ref 22–31)
CREAT ?TM UR-MCNC: 27 MG/DL — SIGNIFICANT CHANGE UP
CREAT SERPL-MCNC: 1.54 MG/DL — HIGH (ref 0.5–1.3)
CULTURE RESULTS: ABNORMAL
EGFR: 43 ML/MIN/1.73M2 — LOW
GLUCOSE BLDC GLUCOMTR-MCNC: 133 MG/DL — HIGH (ref 70–99)
GLUCOSE BLDC GLUCOMTR-MCNC: 133 MG/DL — HIGH (ref 70–99)
GLUCOSE BLDC GLUCOMTR-MCNC: 134 MG/DL — HIGH (ref 70–99)
GLUCOSE BLDC GLUCOMTR-MCNC: 178 MG/DL — HIGH (ref 70–99)
GLUCOSE SERPL-MCNC: 117 MG/DL — HIGH (ref 70–99)
MAGNESIUM SERPL-MCNC: 2.4 MG/DL — SIGNIFICANT CHANGE UP (ref 1.6–2.6)
PHOSPHATE SERPL-MCNC: 3.8 MG/DL — SIGNIFICANT CHANGE UP (ref 2.5–4.5)
POTASSIUM SERPL-MCNC: 4.6 MMOL/L — SIGNIFICANT CHANGE UP (ref 3.5–5.3)
POTASSIUM SERPL-SCNC: 4.6 MMOL/L — SIGNIFICANT CHANGE UP (ref 3.5–5.3)
PROT SERPL-MCNC: 6.7 G/DL — SIGNIFICANT CHANGE UP (ref 6–8.3)
SODIUM SERPL-SCNC: 139 MMOL/L — SIGNIFICANT CHANGE UP (ref 135–145)
SODIUM UR-SCNC: 106 MMOL/L — SIGNIFICANT CHANGE UP
SPECIMEN SOURCE: SIGNIFICANT CHANGE UP
UUN UR-MCNC: 391.7 MG/DL — SIGNIFICANT CHANGE UP

## 2024-03-13 PROCEDURE — 99233 SBSQ HOSP IP/OBS HIGH 50: CPT | Mod: FS

## 2024-03-13 PROCEDURE — 93010 ELECTROCARDIOGRAM REPORT: CPT

## 2024-03-13 RX ORDER — HALOPERIDOL DECANOATE 100 MG/ML
0.25 INJECTION INTRAMUSCULAR EVERY 6 HOURS
Refills: 0 | Status: DISCONTINUED | OUTPATIENT
Start: 2024-03-13 | End: 2024-03-14

## 2024-03-13 RX ORDER — VALPROIC ACID (AS SODIUM SALT) 250 MG/5ML
250 SOLUTION, ORAL ORAL
Refills: 0 | Status: ACTIVE | OUTPATIENT
Start: 2024-03-13 | End: 2025-02-09

## 2024-03-13 RX ADMIN — Medication 2 MILLIGRAM(S): at 21:26

## 2024-03-13 RX ADMIN — TICAGRELOR 60 MILLIGRAM(S): 90 TABLET ORAL at 05:10

## 2024-03-13 RX ADMIN — HEPARIN SODIUM 5000 UNIT(S): 5000 INJECTION INTRAVENOUS; SUBCUTANEOUS at 13:10

## 2024-03-13 RX ADMIN — CHLORHEXIDINE GLUCONATE 1 APPLICATION(S): 213 SOLUTION TOPICAL at 11:39

## 2024-03-13 RX ADMIN — Medication 50 MILLIGRAM(S): at 21:26

## 2024-03-13 RX ADMIN — CHLORHEXIDINE GLUCONATE 15 MILLILITER(S): 213 SOLUTION TOPICAL at 17:07

## 2024-03-13 RX ADMIN — Medication 3 MILLILITER(S): at 07:08

## 2024-03-13 RX ADMIN — HEPARIN SODIUM 5000 UNIT(S): 5000 INJECTION INTRAVENOUS; SUBCUTANEOUS at 05:10

## 2024-03-13 RX ADMIN — PANTOPRAZOLE SODIUM 40 MILLIGRAM(S): 20 TABLET, DELAYED RELEASE ORAL at 05:10

## 2024-03-13 RX ADMIN — Medication 1 DROP(S): at 17:08

## 2024-03-13 RX ADMIN — Medication 1 DROP(S): at 05:09

## 2024-03-13 RX ADMIN — Medication 20 MILLIGRAM(S): at 05:11

## 2024-03-13 RX ADMIN — Medication 250 MILLIGRAM(S): at 17:06

## 2024-03-13 RX ADMIN — Medication 81 MILLIGRAM(S): at 11:34

## 2024-03-13 RX ADMIN — Medication 6 MILLIGRAM(S): at 21:27

## 2024-03-13 RX ADMIN — Medication 1 GRAM(S): at 05:09

## 2024-03-13 RX ADMIN — CARVEDILOL PHOSPHATE 6.25 MILLIGRAM(S): 80 CAPSULE, EXTENDED RELEASE ORAL at 17:08

## 2024-03-13 RX ADMIN — Medication 50 MILLIGRAM(S): at 13:10

## 2024-03-13 RX ADMIN — ESCITALOPRAM OXALATE 10 MILLIGRAM(S): 10 TABLET, FILM COATED ORAL at 11:37

## 2024-03-13 RX ADMIN — Medication 1 GRAM(S): at 17:07

## 2024-03-13 RX ADMIN — HALOPERIDOL DECANOATE 0.25 MILLIGRAM(S): 100 INJECTION INTRAMUSCULAR at 23:15

## 2024-03-13 RX ADMIN — INSULIN GLARGINE 14 UNIT(S): 100 INJECTION, SOLUTION SUBCUTANEOUS at 11:39

## 2024-03-13 RX ADMIN — Medication 1 DROP(S): at 11:34

## 2024-03-13 RX ADMIN — HEPARIN SODIUM 5000 UNIT(S): 5000 INJECTION INTRAVENOUS; SUBCUTANEOUS at 21:27

## 2024-03-13 RX ADMIN — DIVALPROEX SODIUM 250 MILLIGRAM(S): 500 TABLET, DELAYED RELEASE ORAL at 05:54

## 2024-03-13 RX ADMIN — PANTOPRAZOLE SODIUM 40 MILLIGRAM(S): 20 TABLET, DELAYED RELEASE ORAL at 17:07

## 2024-03-13 RX ADMIN — Medication 3 MILLILITER(S): at 22:09

## 2024-03-13 RX ADMIN — TICAGRELOR 60 MILLIGRAM(S): 90 TABLET ORAL at 17:06

## 2024-03-13 RX ADMIN — LEVETIRACETAM 250 MILLIGRAM(S): 250 TABLET, FILM COATED ORAL at 05:11

## 2024-03-13 RX ADMIN — CHLORHEXIDINE GLUCONATE 15 MILLILITER(S): 213 SOLUTION TOPICAL at 05:10

## 2024-03-13 RX ADMIN — LEVETIRACETAM 250 MILLIGRAM(S): 250 TABLET, FILM COATED ORAL at 17:07

## 2024-03-13 RX ADMIN — Medication 1 DROP(S): at 23:17

## 2024-03-13 RX ADMIN — Medication 2: at 11:35

## 2024-03-13 NOTE — PROGRESS NOTE ADULT - SUBJECTIVE AND OBJECTIVE BOX
CHIEF COMPLAINT: Patient is a 89y old  Male who presents with a chief complaint of COVID19, Chest pain (12 Mar 2024 13:24)      INTERVAL EVENTS:   - no overnight events.     ROS: Seen by bedside during AM rounds     OBJECTIVE:  ICU Vital Signs Last 24 Hrs  T(C): 36.6 (13 Mar 2024 04:00), Max: 36.8 (12 Mar 2024 09:00)  T(F): 97.8 (13 Mar 2024 04:00), Max: 98.2 (12 Mar 2024 09:00)  HR: 81 (13 Mar 2024 07:08) (78 - 95)  BP: 132/49 (13 Mar 2024 04:00) (112/53 - 132/49)  BP(mean): 57 (12 Mar 2024 09:00) (57 - 57)  ABP: --  ABP(mean): --  RR: 16 (13 Mar 2024 04:00) (16 - 31)  SpO2: 97% (13 Mar 2024 07:08) (95% - 100%)    O2 Parameters below as of 13 Mar 2024 04:00  Patient On (Oxygen Delivery Method): ventilator          Mode: AC/ CMV (Assist Control/ Continuous Mandatory Ventilation), RR (machine): 12, TV (machine): 450, FiO2: 40, PEEP: 5, ITime: 0.79, MAP: 11, PIP: 44    03-12 @ 07:01  -  03-13 @ 07:00  --------------------------------------------------------  IN: 1450 mL / OUT: 5 mL / NET: 1445 mL      CAPILLARY BLOOD GLUCOSE      POCT Blood Glucose.: 133 mg/dL (13 Mar 2024 05:09)      PHYSICAL EXAM:  General:   HEENT:   Lymph Nodes:  Neck:   Respiratory:   Cardiovascular:   Abdomen:   Extremities:   Skin:   Neurological:  Psychiatry:    Mode: AC/ CMV (Assist Control/ Continuous Mandatory Ventilation)  RR (machine): 12  TV (machine): 450  FiO2: 40  PEEP: 5  ITime: 0.79  MAP: 11  PIP: 44      HOSPITAL MEDICATIONS:  MEDICATIONS  (STANDING):  albuterol/ipratropium for Nebulization 3 milliLiter(s) Nebulizer every 12 hours  artificial  tears Solution 1 Drop(s) Left EYE four times a day  aspirin  chewable 81 milliGRAM(s) Enteral Tube daily  carvedilol 6.25 milliGRAM(s) Oral every 12 hours  chlorhexidine 0.12% Liquid 15 milliLiter(s) Oral Mucosa every 12 hours  chlorhexidine 2% Cloths 1 Application(s) Topical daily  dextrose 5%. 1000 milliLiter(s) (50 mL/Hr) IV Continuous <Continuous>  dextrose 5%. 1000 milliLiter(s) (100 mL/Hr) IV Continuous <Continuous>  dextrose 50% Injectable 25 Gram(s) IV Push once  dextrose 50% Injectable 12.5 Gram(s) IV Push once  dextrose 50% Injectable 25 Gram(s) IV Push once  divalproex Sprinkle 250 milliGRAM(s) Oral two times a day  doxazosin 2 milliGRAM(s) Oral at bedtime  escitalopram Solution 10 milliGRAM(s) Oral daily  furosemide    Tablet 20 milliGRAM(s) Oral daily  glucagon  Injectable 1 milliGRAM(s) IntraMuscular once  heparin   Injectable 5000 Unit(s) SubCutaneous every 8 hours  hydrALAZINE 50 milliGRAM(s) Oral every 8 hours  insulin glargine Injectable (LANTUS) 14 Unit(s) SubCutaneous <User Schedule>  insulin lispro (ADMELOG) corrective regimen sliding scale   SubCutaneous every 6 hours  levETIRAcetam  Solution 250 milliGRAM(s) Oral two times a day  melatonin 6 milliGRAM(s) Oral at bedtime  pantoprazole   Suspension 40 milliGRAM(s) Oral every 12 hours  petrolatum Ophthalmic Ointment 1 Application(s) Left EYE at bedtime  polyethylene glycol 3350 17 Gram(s) Oral daily  senna Syrup 10 milliLiter(s) Oral at bedtime  sucralfate suspension 1 Gram(s) Enteral Tube two times a day  ticagrelor 60 milliGRAM(s) Oral every 12 hours    MEDICATIONS  (PRN):  acetaminophen   Oral Liquid .. 650 milliGRAM(s) Oral every 6 hours PRN Temp greater or equal to 38C (100.4F), Mild Pain (1 - 3), Moderate Pain (4 - 6)  dextrose Oral Gel 15 Gram(s) Oral once PRN Blood Glucose LESS THAN 70 milliGRAM(s)/deciliter  LORazepam   Injectable 0.5 milliGRAM(s) IV Push every 6 hours PRN severe agitation  tranexamic acid Injectable for Nebulization 500 milliGRAM(s) Inhalation every 8 hours PRN hemoptysis      LABS:                        8.0    8.32  )-----------( 340      ( 12 Mar 2024 05:30 )             25.5     03-13    139  |  99  |  49<H>  ----------------------------<  117<H>  4.6   |  32<H>  |  1.54<H>    Ca    8.6      13 Mar 2024 05:50  Phos  3.8     03-13  Mg     2.40     03-13    TPro  6.7  /  Alb  2.6<L>  /  TBili  0.3  /  DBili  <0.2  /  AST  21  /  ALT  18  /  AlkPhos  149<H>  03-13      Urinalysis Basic - ( 13 Mar 2024 05:50 )    Color: x / Appearance: x / SG: x / pH: x  Gluc: 117 mg/dL / Ketone: x  / Bili: x / Urobili: x   Blood: x / Protein: x / Nitrite: x   Leuk Esterase: x / RBC: x / WBC x   Sq Epi: x / Non Sq Epi: x / Bacteria: x         CHIEF COMPLAINT: Patient is a 89y old  Male who presents with a chief complaint of COVID19, Chest pain (12 Mar 2024 13:24)      INTERVAL EVENTS:   - no overnight events.     ROS: Seen by bedside during AM rounds. unable to obtain 2/2 mental status.     OBJECTIVE:  ICU Vital Signs Last 24 Hrs  T(C): 36.6 (13 Mar 2024 04:00), Max: 36.8 (12 Mar 2024 09:00)  T(F): 97.8 (13 Mar 2024 04:00), Max: 98.2 (12 Mar 2024 09:00)  HR: 81 (13 Mar 2024 07:08) (78 - 95)  BP: 132/49 (13 Mar 2024 04:00) (112/53 - 132/49)  BP(mean): 57 (12 Mar 2024 09:00) (57 - 57)  ABP: --  ABP(mean): --  RR: 16 (13 Mar 2024 04:00) (16 - 31)  SpO2: 97% (13 Mar 2024 07:08) (95% - 100%)    O2 Parameters below as of 13 Mar 2024 04:00  Patient On (Oxygen Delivery Method): ventilator          Mode: AC/ CMV (Assist Control/ Continuous Mandatory Ventilation), RR (machine): 12, TV (machine): 450, FiO2: 40, PEEP: 5, ITime: 0.79, MAP: 11, PIP: 44    03-12 @ 07:01  -  03-13 @ 07:00  --------------------------------------------------------  IN: 1450 mL / OUT: 5 mL / NET: 1445 mL      CAPILLARY BLOOD GLUCOSE      POCT Blood Glucose.: 133 mg/dL (13 Mar 2024 05:09)      PHYSICAL EXAM:  General: NAD  Neck: no JVD, trach to vent, site c/d/i  Respiratory: lungs clear to ausculation b/l, no accessory muscle use, no rales/rhonchi/wheeze  Cardiovascular: normal s1 s2 reg rate and rhythm   Abdomen: soft and non tender, +PEG in place  Extremities: no LE edema, in SCDs   Skin: warm, dry  Neurological: Awake and alert, eyes track, does not follow commands  Psychiatry: no agitation     Mode: AC/ CMV (Assist Control/ Continuous Mandatory Ventilation)  RR (machine): 12  TV (machine): 450  FiO2: 40  PEEP: 5  ITime: 0.79  MAP: 11  PIP: 44      HOSPITAL MEDICATIONS:  MEDICATIONS  (STANDING):  albuterol/ipratropium for Nebulization 3 milliLiter(s) Nebulizer every 12 hours  artificial  tears Solution 1 Drop(s) Left EYE four times a day  aspirin  chewable 81 milliGRAM(s) Enteral Tube daily  carvedilol 6.25 milliGRAM(s) Oral every 12 hours  chlorhexidine 0.12% Liquid 15 milliLiter(s) Oral Mucosa every 12 hours  chlorhexidine 2% Cloths 1 Application(s) Topical daily  dextrose 5%. 1000 milliLiter(s) (50 mL/Hr) IV Continuous <Continuous>  dextrose 5%. 1000 milliLiter(s) (100 mL/Hr) IV Continuous <Continuous>  dextrose 50% Injectable 25 Gram(s) IV Push once  dextrose 50% Injectable 12.5 Gram(s) IV Push once  dextrose 50% Injectable 25 Gram(s) IV Push once  divalproex Sprinkle 250 milliGRAM(s) Oral two times a day  doxazosin 2 milliGRAM(s) Oral at bedtime  escitalopram Solution 10 milliGRAM(s) Oral daily  furosemide    Tablet 20 milliGRAM(s) Oral daily  glucagon  Injectable 1 milliGRAM(s) IntraMuscular once  heparin   Injectable 5000 Unit(s) SubCutaneous every 8 hours  hydrALAZINE 50 milliGRAM(s) Oral every 8 hours  insulin glargine Injectable (LANTUS) 14 Unit(s) SubCutaneous <User Schedule>  insulin lispro (ADMELOG) corrective regimen sliding scale   SubCutaneous every 6 hours  levETIRAcetam  Solution 250 milliGRAM(s) Oral two times a day  melatonin 6 milliGRAM(s) Oral at bedtime  pantoprazole   Suspension 40 milliGRAM(s) Oral every 12 hours  petrolatum Ophthalmic Ointment 1 Application(s) Left EYE at bedtime  polyethylene glycol 3350 17 Gram(s) Oral daily  senna Syrup 10 milliLiter(s) Oral at bedtime  sucralfate suspension 1 Gram(s) Enteral Tube two times a day  ticagrelor 60 milliGRAM(s) Oral every 12 hours    MEDICATIONS  (PRN):  acetaminophen   Oral Liquid .. 650 milliGRAM(s) Oral every 6 hours PRN Temp greater or equal to 38C (100.4F), Mild Pain (1 - 3), Moderate Pain (4 - 6)  dextrose Oral Gel 15 Gram(s) Oral once PRN Blood Glucose LESS THAN 70 milliGRAM(s)/deciliter  LORazepam   Injectable 0.5 milliGRAM(s) IV Push every 6 hours PRN severe agitation  tranexamic acid Injectable for Nebulization 500 milliGRAM(s) Inhalation every 8 hours PRN hemoptysis      LABS:                        8.0    8.32  )-----------( 340      ( 12 Mar 2024 05:30 )             25.5     03-13    139  |  99  |  49<H>  ----------------------------<  117<H>  4.6   |  32<H>  |  1.54<H>    Ca    8.6      13 Mar 2024 05:50  Phos  3.8     03-13  Mg     2.40     03-13    TPro  6.7  /  Alb  2.6<L>  /  TBili  0.3  /  DBili  <0.2  /  AST  21  /  ALT  18  /  AlkPhos  149<H>  03-13      Urinalysis Basic - ( 13 Mar 2024 05:50 )    Color: x / Appearance: x / SG: x / pH: x  Gluc: 117 mg/dL / Ketone: x  / Bili: x / Urobili: x   Blood: x / Protein: x / Nitrite: x   Leuk Esterase: x / RBC: x / WBC x   Sq Epi: x / Non Sq Epi: x / Bacteria: x

## 2024-03-13 NOTE — PROGRESS NOTE ADULT - SUBJECTIVE AND OBJECTIVE BOX
Aashish Boyer MD  Interventional Cardiology / Endovascular Specialist  [place] Office : [address] [place] [id number] Tel:   [place] Office : [address] [facility] [address]  Tel: [phone]      Subjective/Overnight events: Patient lying in bed in RCU. No acute distress.   	  MEDICATIONS:  aspirin  chewable 81 milliGRAM(s) Enteral Tube daily  carvedilol 6.25 milliGRAM(s) Oral every 12 hours  doxazosin 2 milliGRAM(s) Oral at bedtime  furosemide    Tablet 20 milliGRAM(s) Oral daily  heparin   Injectable 5000 Unit(s) SubCutaneous every 8 hours  hydrALAZINE 50 milliGRAM(s) Oral every 8 hours  ticagrelor 60 milliGRAM(s) Oral every 12 hours  tranexamic acid Injectable for Nebulization 500 milliGRAM(s) Inhalation every 8 hours PRN      albuterol/ipratropium for Nebulization 3 milliLiter(s) Nebulizer every 12 hours    acetaminophen   Oral Liquid .. 650 milliGRAM(s) Oral every 6 hours PRN  divalproex Sprinkle 250 milliGRAM(s) Oral two times a day  escitalopram Solution 10 milliGRAM(s) Oral daily  levETIRAcetam  Solution 250 milliGRAM(s) Oral two times a day  LORazepam   Injectable 0.5 milliGRAM(s) IV Push every 6 hours PRN  melatonin 6 milliGRAM(s) Oral at bedtime    pantoprazole   Suspension 40 milliGRAM(s) Oral every 12 hours  polyethylene glycol 3350 17 Gram(s) Oral daily  senna Syrup 10 milliLiter(s) Oral at bedtime  sucralfate suspension 1 Gram(s) Enteral Tube two times a day    dextrose 50% Injectable 25 Gram(s) IV Push once  dextrose 50% Injectable 25 Gram(s) IV Push once  dextrose 50% Injectable 12.5 Gram(s) IV Push once  dextrose Oral Gel 15 Gram(s) Oral once PRN  glucagon  Injectable 1 milliGRAM(s) IntraMuscular once  insulin glargine Injectable (LANTUS) 14 Unit(s) SubCutaneous <User Schedule>  insulin lispro (ADMELOG) corrective regimen sliding scale   SubCutaneous every 6 hours    artificial  tears Solution 1 Drop(s) Left EYE four times a day  chlorhexidine 0.12% Liquid 15 milliLiter(s) Oral Mucosa every 12 hours  chlorhexidine 2% Cloths 1 Application(s) Topical daily  dextrose 5%. 1000 milliLiter(s) IV Continuous <Continuous>  dextrose 5%. 1000 milliLiter(s) IV Continuous <Continuous>  petrolatum Ophthalmic Ointment 1 Application(s) Left EYE at bedtime      PAST MEDICAL/SURGICAL HISTORY  PAST MEDICAL & SURGICAL HISTORY:  Hyperlipemia      Hypertension      Coronary Artery Disease      Diabetes Mellitus Type II      Stented Coronary Artery  total 5 stents, last stent 5/2019      Neuropathy      Myocardial infarction      Stroke  mild left facial numbness   no other residuals verbalized      Myoclonic jerking      Stage 3 chronic kidney disease      History of Cataract Extraction      Hx of CABG      H/O coronary angiogram      S/P coronary artery stent placement  1/6/09      S/P placement of cardiac pacemaker          SOCIAL HISTORY: Substance Use (street drugs): ( x ) never used  (  ) other:    FAMILY HISTORY:  No pertinent family history in first degree relatives          PHYSICAL EXAM:  T(C): 36.8 (03-13-24 @ 12:00), Max: 36.9 (03-13-24 @ 08:00)  HR: 84 (03-13-24 @ 12:00) (78 - 97)  BP: 121/50 (03-13-24 @ 12:00) (112/53 - 132/49)  RR: 17 (03-13-24 @ 12:00) (16 - 18)  SpO2: 96% (03-13-24 @ 12:00) (95% - 99%)  Wt(kg): --  I&O's Summary    12 Mar 2024 07:01  -  13 Mar 2024 07:00  --------------------------------------------------------  IN: 1450 mL / OUT: 5 mL / NET: 1445 mL          GENERAL: s/p trach  EYES: conjunctiva and sclera clear  ENMT:   Moist mucous membranes, Good dentition, No lesions  Cardiovascular: Normal S1 S2, No JVD, No murmurs, No edema  Respiratory: Lungs clear to auscultation	  Gastrointestinal:  s/p PEG   Extremities: no edema                                      8.0    8.32  )-----------( 340      ( 12 Mar 2024 05:30 )             25.5     03-13    139  |  99  |  49<H>  ----------------------------<  117<H>  4.6   |  32<H>  |  1.54<H>    Ca    8.6      13 Mar 2024 05:50  Phos  3.8     03-13  Mg     2.40     03-13    TPro  6.7  /  Alb  2.6<L>  /  TBili  0.3  /  DBili  <0.2  /  AST  21  /  ALT  18  /  AlkPhos  149<H>  03-13    proBNP:   Lipid Profile:   HgA1c:   TSH:     Consultant(s) Notes Reviewed:  [x ] YES  [ ] NO    Care Discussed with Consultants/Other Providers [ x] YES  [ ] NO    Imaging Personally Reviewed independently:  [x] YES  [ ] NO    All labs, radiologic studies, vitals, orders and medications list reviewed. Patient is seen and examined at bedside. Case discussed with medical team.

## 2024-03-13 NOTE — PROGRESS NOTE ADULT - ASSESSMENT
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 Course ultimately complicated by progressive WOB and O2 demand and patient intubated and transferred to MICU (1/22). Course further complicated by acute cholecystitis and s/p Perc Lynsey with IR on (1/26), HFrEF 38, pulmonary edema, superimposed PNA, failed extubation failed and prolonged vent time and s/p trach (2/13). Patient transferred to RCU (2/16) and s/p PEG (2/20). Course complicated by volume overload and diuresis and GDMT continued and HFrEF 45 with improvement    NEUROLOGY   # Encephalopathy   - Hx of CVA with no residual deficits per family, however per family worsening confusion at home over the past 6 months (becoming disoriented to time) but prior to admission has been more confused, talking about seeing people that are not present.  - CTH (1/31) with no evidence of acute infarct  - Continue on ASA and Brilinta  - Holding Neurontin, Memantine and Oxycodone in setting of somnolence    # ICA stenosis   - CTA NECK (1/31) with moderate to severe narrowing left internal carotid at the origin. Severe narrowing right internal carotid by a tandem lesion 1.5 cm above the bifurcation with a narrowed internal carotid beyond the lesion. Extensive calcified plaque both cavernous and supraclinoid carotid arteries with narrowing but no occlusion. Mild narrowing proximal right M1 segment. Moderate narrowing both vertebral V4 segments. No perfusion abnormality at the Tmax 6 second threshold, but moderate hypoperfusion in the right MCA territory at the 4 second threshold.   - Continue on ASA and Brilinta    # Myoclonic jerks   - Hx of myoclonic jerks post CVAs   - EEG (1/18-2/6) negative for seizures  - Continue on Keppra and per neuro/ psych wishes to wean, family in agreement   - Currently down titrating Keppra 500 mg BID, now Keppra 250 mg BID 3/13 - )    # Depression/ Insomnia  - Continue on Lexapro per home medication   - Course complicated by agitation and pulling on tubes   - Continue on Seroquel but QTC prolonged and now held   - Continue on Ativan 0.5mg PO Q6H PRN  - Supportive care     # Agitation in setting of delirium / underlying vascular dementia   - 3/11 Psychiatry consulted, started Depakote 125 mg PO BID, increased to 250 mg PO BID 3/12    - monitor platelets, LFTs while on Depakote     CARDIOLOGY   # Vasoplegic vs septic shock  # Hx of HTN   - Weaned off pressors in ICU and now with mild hypertension   - Continue on coreg and hydral as below   - Strict BP control given moderate AS  - Increased vascular congestion on CXR (3/9) so will give additional Lasix 20mg PO x1 (3/10)    # AFIB RVR   # Bifascicular Block with Vapothermtronic PPM   - AFIB RVR episodes and PPM interrogated (2/20) by EP with similar episodes  - Previous admission in 6/2022 with cardiogenic syncope second to bifasicular block, however now with PPM and AV myron blockers ok.   - Continue on lopressor 12.5mg BID however replaced with coreg second to HFrEF   - AC discussed at length however with recent GIB will hold for now     # HFrEF 38 likely stress induced?   # Mild to moderate AS   - ECHO (12/2023) with EF 62 with normal LVSF and mild to moderate AS   - ECHO (2/2024) with EF 38, moderate LVSD with global LV hypokinesis, mildly reduced RVSF (TAPSE 1.3), mild to moderate MR, mild AR, and moderate AS.   - RPT ECHO (3/4) with EF 45 and mild to moderate LVSD   - Continue on lasix 20 QD, coreg 6.25 and hydralazine 50 (increased 3/4)   - Hold isordil and up titrate above medications first     # CAD with CABG   - CATH (5/2022) with dLAD 70, SVG graft to OM1 with 90 in stent stenosis s/p PCI and GEMMA placement, and LIMA graft to mLAD with no disease.   - Continue on ASA and brilinta    # Prolonged QTC   - ECG (3/2) with QTC 616ms (corrected using bazzet 490ms)   - ECG (3/3) with QTC 634ms (corrected using bazzet 490ms)   - ECG (3/11) with QTC 490ms   - Monitor off/ avoid QTC prolonging agents for now    RESPIRATORY   # Acute respiratory failure second to SARS-COV-2 vs pulm edema vs PNA  - Presented with COVID complicated by sepsis second to acute lynsey and worsening respiratory failure second to pulmonary edema requiring intubation.   - Prolonged intubation and s/p tracheostomy (2/13)   - CT CHEST 1/16 with new small bilateral pleural effusions/ atelectasis/ patchy bilateral ground-glass opacities are consistent with pulmonary edema.  - Completed ABX and COVID regimen as below   - Continue on vent and initially tolerating some SBT 10/5   - Continue on nebs and hold further HTS given intermittent hemoptysis  - Continue on diuresis as above    # Mild hemoptysis likely from suction trauma   - s/p bronch (2/18) with no active bleed  - Hemoptysis improved with TXA and holding further HTS as above   - Tiny hemoptysis second to suction trauma imporving  - Careful with suctioning   - Recurrent hemoptysis (3/9) so ordered for TXA nebs again however hemoptysis appeared self limited and stopped before nebs even given    HEENT   # L eye subconjunctival hemorrhage   - Continue on artifical tears and Lacrilube   - Optho eval appreciated     GI  # Nutrition/ Dysphagia   - PEG placed by GI on 2/20 and tube feeds continued.   - Loose stools and Banatrol continued     # UGIB   - EGD (1/2) with esophagitis and bleeding Dieulafoy's lesion s/p clips.   - Continue on PPI and carafate     # SMA calcification   - CTA demonstrating mesenteric fat stranding associated with ascending/transverse colon  - Diagnostic laparoscopy (12/24) with small bowel and visible colon viable, some inflammation of omentum in RUQ  - SMA Angiogram and stent placed (12/25).   - Continue on ASA and Brilinta     # Acute Cholecystitis   - CT (12/26) with distended GB with cholelithiasis and sludge   - HIDA (12/29) POSITIVE for acute cholecystitis   - Case discussed with IR at length and s/p PERC LYNSEY (1/26)   - Completed zosyn course (12/24-12/31)   - PERC LYNSEY tube to stay in until surgical candidate for cholecystectomy to prevent recurrence     / RENAL   # CKD   - CRE at baseline   - Monitor renal function and UOP   - Aranesp SQ x1 (3/8)    # Hypernatremia (resolved)   - s/p  Q4H  - Monitor closely with diuresis as above       INFECTIOUS DISEASE   # COVID with superimposed PNA   # ESBL Kleb PNA   - COVID POSITIVE (12/23) and completed remdesivir x 1 dose and paxlovid course.   - WOB worsened as above requiring intubation and cultures negative. Zosyn completed empirically however hypothermic (2/11) and BCx, UA, RVP and BAL negative.   - Completed additional zosyn (2/12-2/19) empirically   - Fever spike and thick secretions and SCX (2/26) with ESBL klebs.   - s/p Zosyn (2/27 - 2/29) but resistant and completed Erta (2/29 - 3/6)     HEME  # AOCD   - Monitor HH   - DVT PPX with HSQ     ENDOCRINE   # DM2 A1C 9.3   - Continue on Lantus 14 and ISS     SKIN/ TUBES   - Trach (2/13)   - PEG (2/20)   - PERC LYNSEY (1/26 - )     ETHICS   - FULL CODE     DISPO - vent facility and family aware. SW with accepting facility however pending available bed.

## 2024-03-13 NOTE — CHART NOTE - NSCHARTNOTEFT_GEN_A_CORE
Brief Psych CL Note:  Patient seen this AM. He is sleeping soundly. Decision made to not wake patient up.  He has increased spasticity and possible cogwheel on RUE and wrist. Loose on LUE.    EKG from 3/11  , , HR 86, QRS 86, RR ~ 0.7  , JTI 99, Bogossian 345    Spoke with son who was present and other son who is neurologist over the phone.    Plan:  Can continue with depakote 250mg BID       [] he will need his platelets, LFTs, albumin, ammonia, and VPA level monitored at rehab and adjusted accordingly  Defer cross taper of keppra per primary team- of note, patient's son is neurologist  Agitation:       [] given improved EKG, would still repeat EKG today to ensure it is in fact improved like above          - if EKG QTC < 500 (when corrected), can consider using IV haldol 0.25 q6 h prn for severe agitation, though closely monitor for worsening EPS          - or can continue using ativan prn per RCU      Please call with questions Brief Psych CL Note:  Patient seen this AM. He is sleeping soundly. Decision made to not wake patient up.  He has increased spasticity and possible cogwheel on RUE and wrist. Loose on LUE.    EKG from 3/11  , , HR 86, QRS 86, RR ~ 0.7  , JTI 99, Bogossian 345    Spoke with son who was present and other son who is neurologist over the phone.    Plan:  Can continue with depakote 250mg BID       [] he will need his platelets, LFTs, albumin, ammonia, and VPA level monitored at rehab and adjusted accordingly       - of note, his VPA level will need to be adjusted for low albumin- please indicate this on DC summary  Defer cross taper of keppra per primary team- of note, patient's son is neurologist  Agitation:       [] given improved EKG, would still repeat EKG today to ensure it is in fact improved like above          - if EKG QTC < 500 (when corrected), can consider using IV haldol 0.25 q6 h prn for severe agitation, though closely monitor for worsening EPS          - or can continue using ativan 0.5mg prn per RCU      Please call with questions

## 2024-03-13 NOTE — PROGRESS NOTE ADULT - NS ATTEND AMEND GEN_ALL_CORE FT
Pt is a 89M with PMHx CAD s/p CABG/GEMMA (5/2022 on ASA/Brilinta) and bifascicular block s/p PPM (6/2022 Medtronic), pAFib, HTN/HLD, AoS (mild-moderate), and HTN/HLD presenting to [facility] on 12/23/23 with AMS 2/2 encephalopathy in the setting of COVD19 PNA further found to have SMA calcification s/p stent placement (12/25/23) with hospital course c/b UGIB s/p EGD (1/2/24) and failure to wean from ventilator s/p tracheostomy placement and transfer to RCU for further management.     Pt with persistent encephalopathy with intermittent episodes of confusion and disorientation with trach/ling pulling in the setting of prolonged hospitalization with critical illness. CT Head (1/31) without acute pathologic anatomy. EEG (2/6) without epileptiform discharges. On ASA/Brilinta from prior hx CVAs (without known neurologic deficits) with known hx severe b/l internal carotid narrowing without occlusions. On home SSRI, but pt continued to have periods of agitation with line/trach pulling unable to receive Seroquel 2/2 prolonged QTc. Limiting benzos. Psych consulted over the weekend for further assistance, initiated on small dose depakote with uptitratation today. Decreased keppra simultaneously, as only in place as home medication for tremors/jerking. Pt much more calm today, will continue to monitor with change in medication. Pt further remains physically weak and debilitated in the setting of prolonged critical illness with polyneuropathy.     Pt with cardiac hz as above now hemodynamically stable off vasopressors, on DMT as tolerated. TTE 2/24 with new biventricular failure with EF 38% and moderate MR/AS, repeat TTE 3/4 showing improved LVSF. Will c/w lasix + carvedilol + hydralazine + statin, holding isordil for now with uptitration of current medications first. Will c/w ASA/Brilinta (GEMMA 5/22). Cardiology following, recs appreciated.    Pt with acute hypoxemic respiratory failure 2/2 COVID19 +/- flash pulmonary edema with severe sepsis further c/b acute cholecystitis requiring ETT intubation/MV with failure to wean from ventilator s/p tracheostomy placement (2/13). Will c/w SBT trials as tolerated. Airway clearance therapy in place. Trach care and suctioning as per RCU team. Oral hygiene and aspiration precautions in place. Hemoptysis earlier in admission now resolved.     Pt with oropharyngeal dysphagia s/p PEG placement (GI, 2/20) now tolerating feeds at goal rate. Hospital course c/b acute cholecystitis s/p percutaneous asa (1/26), will remain until cholecystectomy. Abx course completed. SMA calcification initially found on CTA now s/p laparoscopy (12/24) with SMA angiogram and stent placement (12/25.) Will c/w DAPT. UGIB 2/2 esophagitis with bleeding Dieulafoy lesion s/p EGD (1/2) with clips. Will c/w PPI and Carafate. Hypernatremia improved on free water. DMII well controlled on basal/bolus insulin with ISS and BGFS monitoring.     Pt with hospital course c/b COVID with superimposed PNA s/p tx followed by ESBL Klebsiella PNA s/p Zosyn followed by broadening to Ertapenem through 3/6. Now off Abx. Will CTM conservatively with low threshold to restart abx.     Dispo pending medical optimization. Pt full code. DVT ppx HSQ. Plan for dispo to LTCF with ventilator capabilities. Will continue to update pt and family throughout hospitalization.

## 2024-03-14 LAB
ANION GAP SERPL CALC-SCNC: 11 MMOL/L — SIGNIFICANT CHANGE UP (ref 7–14)
BUN SERPL-MCNC: 49 MG/DL — HIGH (ref 7–23)
CALCIUM SERPL-MCNC: 8.4 MG/DL — SIGNIFICANT CHANGE UP (ref 8.4–10.5)
CHLORIDE SERPL-SCNC: 97 MMOL/L — LOW (ref 98–107)
CO2 SERPL-SCNC: 30 MMOL/L — SIGNIFICANT CHANGE UP (ref 22–31)
CREAT SERPL-MCNC: 1.45 MG/DL — HIGH (ref 0.5–1.3)
EGFR: 46 ML/MIN/1.73M2 — LOW
GLUCOSE BLDC GLUCOMTR-MCNC: 156 MG/DL — HIGH (ref 70–99)
GLUCOSE BLDC GLUCOMTR-MCNC: 160 MG/DL — HIGH (ref 70–99)
GLUCOSE BLDC GLUCOMTR-MCNC: 176 MG/DL — HIGH (ref 70–99)
GLUCOSE SERPL-MCNC: 154 MG/DL — HIGH (ref 70–99)
HCT VFR BLD CALC: 26 % — LOW (ref 39–50)
HGB BLD-MCNC: 8.3 G/DL — LOW (ref 13–17)
MAGNESIUM SERPL-MCNC: 2.4 MG/DL — SIGNIFICANT CHANGE UP (ref 1.6–2.6)
MCHC RBC-ENTMCNC: 29.2 PG — SIGNIFICANT CHANGE UP (ref 27–34)
MCHC RBC-ENTMCNC: 31.9 GM/DL — LOW (ref 32–36)
MCV RBC AUTO: 91.5 FL — SIGNIFICANT CHANGE UP (ref 80–100)
NRBC # BLD: 0 /100 WBCS — SIGNIFICANT CHANGE UP (ref 0–0)
NRBC # FLD: 0 K/UL — SIGNIFICANT CHANGE UP (ref 0–0)
PHOSPHATE SERPL-MCNC: 3.4 MG/DL — SIGNIFICANT CHANGE UP (ref 2.5–4.5)
PLATELET # BLD AUTO: 293 K/UL — SIGNIFICANT CHANGE UP (ref 150–400)
POTASSIUM SERPL-MCNC: 4.3 MMOL/L — SIGNIFICANT CHANGE UP (ref 3.5–5.3)
POTASSIUM SERPL-SCNC: 4.3 MMOL/L — SIGNIFICANT CHANGE UP (ref 3.5–5.3)
RBC # BLD: 2.84 M/UL — LOW (ref 4.2–5.8)
RBC # FLD: 16.1 % — HIGH (ref 10.3–14.5)
SODIUM SERPL-SCNC: 138 MMOL/L — SIGNIFICANT CHANGE UP (ref 135–145)
WBC # BLD: 7.49 K/UL — SIGNIFICANT CHANGE UP (ref 3.8–10.5)
WBC # FLD AUTO: 7.49 K/UL — SIGNIFICANT CHANGE UP (ref 3.8–10.5)

## 2024-03-14 PROCEDURE — 99232 SBSQ HOSP IP/OBS MODERATE 35: CPT

## 2024-03-14 PROCEDURE — 99233 SBSQ HOSP IP/OBS HIGH 50: CPT | Mod: FS

## 2024-03-14 PROCEDURE — 93010 ELECTROCARDIOGRAM REPORT: CPT

## 2024-03-14 RX ORDER — OLANZAPINE 15 MG/1
1.25 TABLET, FILM COATED ORAL EVERY 8 HOURS
Refills: 0 | Status: DISCONTINUED | OUTPATIENT
Start: 2024-03-14 | End: 2024-03-15

## 2024-03-14 RX ORDER — VALPROIC ACID (AS SODIUM SALT) 250 MG/5ML
125 SOLUTION, ORAL ORAL
Refills: 0 | Status: DISCONTINUED | OUTPATIENT
Start: 2024-03-14 | End: 2024-03-15

## 2024-03-14 RX ORDER — HYDRALAZINE HCL 50 MG
25 TABLET ORAL EVERY 8 HOURS
Refills: 0 | Status: DISCONTINUED | OUTPATIENT
Start: 2024-03-14 | End: 2024-03-24

## 2024-03-14 RX ORDER — VALPROIC ACID (AS SODIUM SALT) 250 MG/5ML
125 SOLUTION, ORAL ORAL
Refills: 0 | Status: DISCONTINUED | OUTPATIENT
Start: 2024-03-14 | End: 2024-03-14

## 2024-03-14 RX ORDER — VALPROIC ACID (AS SODIUM SALT) 250 MG/5ML
250 SOLUTION, ORAL ORAL
Refills: 0 | Status: DISCONTINUED | OUTPATIENT
Start: 2024-03-14 | End: 2024-03-14

## 2024-03-14 RX ORDER — OLANZAPINE 15 MG/1
1.25 TABLET, FILM COATED ORAL EVERY 6 HOURS
Refills: 0 | Status: DISCONTINUED | OUTPATIENT
Start: 2024-03-14 | End: 2024-03-14

## 2024-03-14 RX ORDER — VALPROIC ACID (AS SODIUM SALT) 250 MG/5ML
250 SOLUTION, ORAL ORAL
Refills: 0 | Status: DISCONTINUED | OUTPATIENT
Start: 2024-03-14 | End: 2024-03-15

## 2024-03-14 RX ADMIN — CARVEDILOL PHOSPHATE 6.25 MILLIGRAM(S): 80 CAPSULE, EXTENDED RELEASE ORAL at 06:51

## 2024-03-14 RX ADMIN — PANTOPRAZOLE SODIUM 40 MILLIGRAM(S): 20 TABLET, DELAYED RELEASE ORAL at 17:02

## 2024-03-14 RX ADMIN — LEVETIRACETAM 250 MILLIGRAM(S): 250 TABLET, FILM COATED ORAL at 06:43

## 2024-03-14 RX ADMIN — Medication 250 MILLIGRAM(S): at 06:50

## 2024-03-14 RX ADMIN — Medication 3 MILLILITER(S): at 18:55

## 2024-03-14 RX ADMIN — Medication 50 MILLIGRAM(S): at 06:43

## 2024-03-14 RX ADMIN — Medication 20 MILLIGRAM(S): at 06:43

## 2024-03-14 RX ADMIN — TICAGRELOR 60 MILLIGRAM(S): 90 TABLET ORAL at 06:41

## 2024-03-14 RX ADMIN — Medication 1 DROP(S): at 06:41

## 2024-03-14 RX ADMIN — CHLORHEXIDINE GLUCONATE 15 MILLILITER(S): 213 SOLUTION TOPICAL at 06:40

## 2024-03-14 RX ADMIN — HEPARIN SODIUM 5000 UNIT(S): 5000 INJECTION INTRAVENOUS; SUBCUTANEOUS at 06:41

## 2024-03-14 RX ADMIN — Medication 25 MILLIGRAM(S): at 13:10

## 2024-03-14 RX ADMIN — OLANZAPINE 1.25 MILLIGRAM(S): 15 TABLET, FILM COATED ORAL at 21:17

## 2024-03-14 RX ADMIN — TICAGRELOR 60 MILLIGRAM(S): 90 TABLET ORAL at 17:03

## 2024-03-14 RX ADMIN — LEVETIRACETAM 250 MILLIGRAM(S): 250 TABLET, FILM COATED ORAL at 17:03

## 2024-03-14 RX ADMIN — PANTOPRAZOLE SODIUM 40 MILLIGRAM(S): 20 TABLET, DELAYED RELEASE ORAL at 06:40

## 2024-03-14 RX ADMIN — Medication 6 MILLIGRAM(S): at 21:20

## 2024-03-14 RX ADMIN — Medication 2: at 18:31

## 2024-03-14 RX ADMIN — Medication 1 GRAM(S): at 17:02

## 2024-03-14 RX ADMIN — Medication 1 GRAM(S): at 06:40

## 2024-03-14 RX ADMIN — Medication 2: at 06:05

## 2024-03-14 RX ADMIN — Medication 2 MILLIGRAM(S): at 21:21

## 2024-03-14 RX ADMIN — HEPARIN SODIUM 5000 UNIT(S): 5000 INJECTION INTRAVENOUS; SUBCUTANEOUS at 21:19

## 2024-03-14 RX ADMIN — CHLORHEXIDINE GLUCONATE 15 MILLILITER(S): 213 SOLUTION TOPICAL at 17:02

## 2024-03-14 RX ADMIN — CARVEDILOL PHOSPHATE 6.25 MILLIGRAM(S): 80 CAPSULE, EXTENDED RELEASE ORAL at 17:02

## 2024-03-14 RX ADMIN — Medication 1 DROP(S): at 17:04

## 2024-03-14 RX ADMIN — Medication 25 MILLIGRAM(S): at 21:20

## 2024-03-14 RX ADMIN — Medication 3 MILLILITER(S): at 07:06

## 2024-03-14 RX ADMIN — Medication 125 MILLIGRAM(S): at 17:01

## 2024-03-14 RX ADMIN — Medication 250 MILLIGRAM(S): at 21:18

## 2024-03-14 NOTE — PROGRESS NOTE ADULT - NS ATTEND AMEND GEN_ALL_CORE FT
Pt is a 89M with PMHx CAD s/p CABG/GEMMA (5/2022 on ASA/Brilinta) and bifascicular block s/p PPM (6/2022 Medtronic), pAFib, HTN/HLD, AoS (mild-moderate), and HTN/HLD presenting to [facility] on 12/23/23 with AMS 2/2 encephalopathy in the setting of COVD19 PNA further found to have SMA calcification s/p stent placement (12/25/23) with hospital course c/b UGIB s/p EGD (1/2/24) and failure to wean from ventilator s/p tracheostomy placement and transfer to RCU for further management.     Pt with persistent encephalopathy with intermittent episodes of confusion and disorientation with trach/ling pulling in the setting of prolonged hospitalization with critical illness. CT Head (1/31) without acute pathologic anatomy. EEG (2/6) without epileptiform discharges. On ASA/Brilinta from prior hx CVAs (without known neurologic deficits) with known hx severe b/l internal carotid narrowing without occlusions. On home SSRI, but pt continued to have periods of agitation with line/trach pulling unable to receive Seroquel 2/2 prolonged QTc. Limiting benzos, prn changed to zyprexa. Psych consulted over the weekend for further assistance, initiated on small dose depakote with uptitratation today to TID. Decreased keppra simultaneously, as only in place as home medication for tremors/jerking. Pt much more calm today, will continue to monitor with change in medication. Pt further remains physically weak and debilitated in the setting of prolonged critical illness with polyneuropathy.     Pt with cardiac hz as above now hemodynamically stable off vasopressors, on DMT as tolerated. TTE 2/24 with new biventricular failure with EF 38% and moderate MR/AS, repeat TTE 3/4 showing improved LVSF. Will c/w lasix + carvedilol + hydralazine + statin, holding isordil for now with uptitration of current medications first. Will c/w ASA/Brilinta (GEMMA 5/22). Cardiology following, recs appreciated.    Pt with acute hypoxemic respiratory failure 2/2 COVID19 +/- flash pulmonary edema with severe sepsis further c/b acute cholecystitis requiring ETT intubation/MV with failure to wean from ventilator s/p tracheostomy placement (2/13). Will c/w SBT trials as tolerated. Airway clearance therapy in place. Trach care and suctioning as per RCU team. Oral hygiene and aspiration precautions in place. Hemoptysis earlier in admission now resolved.     Pt with oropharyngeal dysphagia s/p PEG placement (GI, 2/20) now tolerating feeds at goal rate. Hospital course c/b acute cholecystitis s/p percutaneous asa (1/26), will remain until cholecystectomy. Abx course completed. SMA calcification initially found on CTA now s/p laparoscopy (12/24) with SMA angiogram and stent placement (12/25.) Will c/w DAPT. UGIB 2/2 esophagitis with bleeding Dieulafoy lesion s/p EGD (1/2) with clips. Will c/w PPI and Carafate. Hypernatremia improved on free water. DMII well controlled on basal/bolus insulin with ISS and BGFS monitoring.     Pt with hospital course c/b COVID with superimposed PNA s/p tx followed by ESBL Klebsiella PNA s/p Zosyn followed by broadening to Ertapenem through 3/6. Now off Abx. Will CTM conservatively with low threshold to restart abx.     Dispo pending medical optimization. Pt full code. DVT ppx HSQ. Plan for dispo to LTCF with ventilator capabilities. Will continue to update pt and family throughout hospitalization.

## 2024-03-14 NOTE — BH CONSULTATION LIAISON PROGRESS NOTE - NSBHASSESSMENTFT_PSY_ALL_CORE
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 Course ultimately complicated by progressive WOB and O2 demand and patient intubated and transferred to MICU (1/22). Course further complicated by acute cholecystitis and s/p Perc Lynsey with IR on (1/26), HFrEF 38, pulmonary edema, superimposed PNA, failed extubation failed and prolonged vent time and s/p trach (2/13). Patient transferred to RCU (2/16) and s/p PEG (2/20). Course complicated by volume overload and diuresis and GDMT continued and HFrEF 45 with improvement.  No known psychiatric history. Psychiatry consulted for agitation. He has been receiving PO and IM ativan several times per day, concern for oversedation. Son and DIL are physicians and requested consult.     Due to multiple medical comorbities and prolonged QTc, would hold off on antipsychotics and other QT prolonging agents.   Ativan is not an ideal PRN for this patient given deliriogenic effects and paradoxical activation.  Discussed with family plan to start low dose depakote for impulsivity and agitation and uptitrated as tolerated.    3/12: received ativan x2 overnight for ongoing agitation. LFTs wnl. Will continue to increase depakote and monitor. Would recommend cross tapering with keppra as keppra may be contributing to agitated delirium. Discussed with family.       3/14: some improvement in agitation with depakote, repeat EKG with QTc <500, still proceed with caution with antipsychotics, though antipsychotics are preferable to benzos for agitation in delirium    Plan:  - INCREASE depakote to 250mg PO BID + 125mg at 1300 for impulsivity and agitation  - would begin to taper keppra and utilize Depakote for both Keppra indications and impulsivity.  - delirium precautions   - monitor LFTs, platelets, ammonia, VPA level   - of note, his VPA level will need to be adjusted for low albumin- please indicate this on DC summary  - repeat EKG, can use IV/IM olanzapine 1.25mg for agitation if QTc <500, otherwise continue using ativan 0.5mg per RCU   89 YO M with very complicated medical history and prolonged hospital course, notable for PMHx of CAD s/p CABG and GEMMA (last GEMMA 5/2022 on ASA and Brilinta), 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 Course ultimately complicated by progressive WOB and O2 demand and patient intubated and transferred to MICU (1/22). Course further complicated by acute cholecystitis and s/p Perc Lynsey with IR on (1/26), HFrEF 38, pulmonary edema, superimposed PNA, failed extubation failed and prolonged vent time and s/p trach (2/13). Patient transferred to RCU (2/16) and s/p PEG (2/20). Course complicated by volume overload and diuresis and GDMT continued and HFrEF 45 with improvement.  No known psychiatric history. Psychiatry consulted for agitation. He has been receiving PO and IM ativan several times per day, concern for oversedation. Son and DIL are physicians and requested consult.     Due to multiple medical comorbities and prolonged QTc, would hold off on antipsychotics and other QT prolonging agents.   Ativan is not an ideal PRN for this patient given deliriogenic effects and paradoxical activation.  Discussed with family plan to start low dose depakote for impulsivity and agitation and uptitrated as tolerated.    3/12: received ativan x2 overnight for ongoing agitation. LFTs wnl. Will continue to increase depakote and monitor. Would recommend cross tapering with keppra as keppra may be contributing to agitated delirium. Discussed with family.     3/14: some improvement in agitation with depakote, repeat EKG with QTc <500, still proceed with caution with antipsychotics, though antipsychotics are preferable to benzos for agitation in delirium    Plan:  - INCREASE depakote to 250mg PO BID + 125mg at 1300 for impulsivity and agitation  - would begin to taper keppra and utilize Depakote for both Keppra indications and impulsivity.  	- defer this to primary team and neurology  - delirium precautions   - monitor LFTs, platelets, ammonia, VPA level   - of note, his VPA level will need to be adjusted for low albumin- please indicate this on DC summary  - repeat EKG, can use IV/IM olanzapine 1.25mg q8h prn for agitation if QTc <500, otherwise continue using ativan 0.5mg per RCU

## 2024-03-14 NOTE — PROGRESS NOTE ADULT - ASSESSMENT
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 Course ultimately complicated by progressive WOB and O2 demand and patient intubated and transferred to MICU (1/22). Course further complicated by acute cholecystitis and s/p Perc Lynsey with IR on (1/26), HFrEF 38, pulmonary edema, superimposed PNA, failed extubation failed and prolonged vent time and s/p trach (2/13). Patient transferred to RCU (2/16) and s/p PEG (2/20). Course complicated by volume overload and diuresis and GDMT continued and HFrEF 45 with improvement    NEUROLOGY   # Encephalopathy   - Hx of CVA with no residual deficits per family, however per family worsening confusion at home over the past 6 months (becoming disoriented to time) but prior to admission has been more confused, talking about seeing people that are not present.  - CTH (1/31) with no evidence of acute infarct  - Continue on ASA and Brilinta  - Holding Neurontin, Memantine and Oxycodone in setting of somnolence    # ICA stenosis   - CTA NECK (1/31) with moderate to severe narrowing left internal carotid at the origin. Severe narrowing right internal carotid by a tandem lesion 1.5 cm above the bifurcation with a narrowed internal carotid beyond the lesion. Extensive calcified plaque both cavernous and supraclinoid carotid arteries with narrowing but no occlusion. Mild narrowing proximal right M1 segment. Moderate narrowing both vertebral V4 segments. No perfusion abnormality at the Tmax 6 second threshold, but moderate hypoperfusion in the right MCA territory at the 4 second threshold.   - Continue on ASA and Brilinta    # Myoclonic jerks   - Hx of myoclonic jerks post CVAs   - EEG (1/18-2/6) negative for seizures  - Continue on Keppra and per neuro/ psych wishes to wean, family in agreement   - Currently down titrating Keppra 500 mg BID, now Keppra 250 mg BID 3/13 - )    # Depression/ Insomnia  - Continue on Lexapro per home medication   - Course complicated by agitation and pulling on tubes   - Home Seroquel discontinued as patient now on Valproic acid syrup   - S/p Ativan 0.5mg PO Q6H PRN, now Haldol 0.25 mg Q6H for severe agitation   - Supportive care     # Agitation in setting of delirium / underlying vascular dementia   - 3/11 Psychiatry consulted, started Depakote 125 mg PO BID, increased to 250 mg PO BID 3/12    - monitor platelets, LFTs while on Depakote     CARDIOLOGY   # Vasoplegic vs septic shock  # Hx of HTN   - Weaned off pressors in ICU and now with mild hypertension   - Continue on coreg and hydral as below   - Strict BP control given moderate AS  - Increased vascular congestion on CXR (3/9) so will give additional Lasix 20mg PO x1 (3/10)    # AFIB RVR   # Bifascicular Block with [employer] PPM   - AFIB RVR episodes and PPM interrogated (2/20) by EP with similar episodes  - Previous admission in 6/2022 with cardiogenic syncope second to bifasicular block, however now with PPM and AV myron blockers ok.   - Continue on lopressor 12.5mg BID however replaced with coreg second to HFrEF   - AC discussed at length however with recent GIB will hold for now     # HFrEF 38 likely stress induced?   # Mild to moderate AS   - ECHO (12/2023) with EF 62 with normal LVSF and mild to moderate AS   - ECHO (2/2024) with EF 38, moderate LVSD with global LV hypokinesis, mildly reduced RVSF (TAPSE 1.3), mild to moderate MR, mild AR, and moderate AS.   - RPT ECHO (3/4) with EF 45 and mild to moderate LVSD   - Continue on lasix 20 QD, coreg 6.25 and hydralazine 50 (increased 3/4)   - Hold isordil and up titrate above medications first     # CAD with CABG   - CATH (5/2022) with dLAD 70, SVG graft to OM1 with 90 in stent stenosis s/p PCI and GEMMA placement, and LIMA graft to mLAD with no disease.   - Continue on ASA and brilinta    # Prolonged QTC (Resolved)  - ECG (3/2) with QTC 616ms (corrected using bazzet 490ms)   - ECG (3/3) with QTC 634ms (corrected using bazzet 490ms)   - ECG (3/11) with QTC 490ms   - Monitor off/ avoid QTC prolonging agents for now    RESPIRATORY   # Acute respiratory failure second to SARS-COV-2 vs pulm edema vs PNA  - Presented with COVID complicated by sepsis second to acute lynsey and worsening respiratory failure second to pulmonary edema requiring intubation.   - Prolonged intubation and s/p tracheostomy (2/13)   - CT CHEST 1/16 with new small bilateral pleural effusions/ atelectasis/ patchy bilateral ground-glass opacities are consistent with pulmonary edema.  - Completed ABX and COVID regimen as below   - Continue on vent and initially tolerating some SBT 10/5   - Continue on nebs and hold further HTS given intermittent hemoptysis  - Continue on diuresis as above    # Mild hemoptysis likely from suction trauma   - s/p bronch (2/18) with no active bleed  - Hemoptysis improved with TXA and holding further HTS as above   - Tiny hemoptysis second to suction trauma imporving  - Careful with suctioning   - Recurrent hemoptysis (3/9) so ordered for TXA nebs again however hemoptysis appeared self limited and stopped before nebs even given    HEENT   # L eye subconjunctival hemorrhage   - Continue on artifical tears and Lacrilube   - Optho eval appreciated     GI  # Nutrition/ Dysphagia   - PEG placed by GI on 2/20 and tube feeds continued.   - Loose stools and Banatrol continued     # UGIB   - EGD (1/2) with esophagitis and bleeding Dieulafoy's lesion s/p clips.   - Continue on PPI and carafate     # SMA calcification   - CTA demonstrating mesenteric fat stranding associated with ascending/transverse colon  - Diagnostic laparoscopy (12/24) with small bowel and visible colon viable, some inflammation of omentum in RUQ  - SMA Angiogram and stent placed (12/25).   - Continue on ASA and Brilinta     # Acute Cholecystitis   - CT (12/26) with distended GB with cholelithiasis and sludge   - HIDA (12/29) POSITIVE for acute cholecystitis   - Case discussed with IR at length and s/p PERC LYNSEY (1/26)   - Completed zosyn course (12/24-12/31)   - PERC LYNSEY tube to stay in until surgical candidate for cholecystectomy to prevent recurrence     / RENAL   # CKD   - CRE at baseline   - Monitor renal function and UOP   - Aranesp SQ x1 (3/8)    # Hypernatremia (resolved)   - s/p  Q4H  - Monitor closely with diuresis as above       INFECTIOUS DISEASE   # COVID with superimposed PNA   # ESBL Kleb PNA   - COVID POSITIVE (12/23) and completed remdesivir x 1 dose and paxlovid course.   - WOB worsened as above requiring intubation and cultures negative. Zosyn completed empirically however hypothermic (2/11) and BCx, UA, RVP and BAL negative.   - Completed additional zosyn (2/12-2/19) empirically   - Fever spike and thick secretions and SCX (2/26) with ESBL klebs.   - s/p Zosyn (2/27 - 2/29) but resistant and completed Erta (2/29 - 3/6)     HEME  # AOCD   - Monitor HH   - DVT PPX with HSQ     ENDOCRINE   # DM2 A1C 9.3   - Continue on Lantus 14 and ISS     SKIN/ TUBES   - Trach (2/13)   - PEG (2/20)   - PERC LYNSEY (1/26 - )     ETHICS   - FULL CODE     DISPO - vent facility and family aware. SW with accepting facility however pending available bed.  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 Course ultimately complicated by progressive WOB and O2 demand and patient intubated and transferred to MICU (1/22). Course further complicated by acute cholecystitis and s/p Perc Lynsey with IR on (1/26), HFrEF 38, pulmonary edema, superimposed PNA, failed extubation failed and prolonged vent time and s/p trach (2/13). Patient transferred to RCU (2/16) and s/p PEG (2/20). Course complicated by volume overload and diuresis and GDMT continued and HFrEF 45 with improvement    NEUROLOGY   # Encephalopathy   - Hx of CVA with no residual deficits per family, however per family worsening confusion at home over the past 6 months (becoming disoriented to time) but prior to admission has been more confused, talking about seeing people that are not present.  - CTH (1/31) with no evidence of acute infarct  - Continue on ASA and Brilinta  - Holding Neurontin, Memantine and Oxycodone in setting of somnolence    # ICA stenosis   - CTA NECK (1/31) with moderate to severe narrowing left internal carotid at the origin. Severe narrowing right internal carotid by a tandem lesion 1.5 cm above the bifurcation with a narrowed internal carotid beyond the lesion. Extensive calcified plaque both cavernous and supraclinoid carotid arteries with narrowing but no occlusion. Mild narrowing proximal right M1 segment. Moderate narrowing both vertebral V4 segments. No perfusion abnormality at the Tmax 6 second threshold, but moderate hypoperfusion in the right MCA territory at the 4 second threshold.   - Continue on ASA and Brilinta    # Myoclonic jerks   - Hx of myoclonic jerks post CVAs   - EEG (1/18-2/6) negative for seizures  - Continue on Keppra and per neuro/ psych wishes to wean, family in agreement   - Currently down titrating Keppra 500 mg BID, now Keppra 250 mg BID 3/13 - )    # Depression/ Insomnia  - Continue on Lexapro per home medication   - Course complicated by agitation and pulling on tubes   - Home Seroquel discontinued as patient now on Valproic acid syrup   - S/p Ativan 0.5mg PO Q6H PRN and Haldol 0.25 mg Q6H for severe agitation, now IM Zypexa 1.25 Q8H PRN for agitation    - Supportive care     # Agitation in setting of delirium / underlying vascular dementia   - 3/11 Psychiatry consulted, started Depakote 125 mg PO BID, increased to 250 mg PO BID 3/12  and additional 125 mg PO QD added at 1300 on 3/14   - monitor platelets, LFTs while on Depakote     CARDIOLOGY   # Vasoplegic vs septic shock  # Hx of HTN   - Weaned off pressors in ICU and now with mild hypertension   - Continue on coreg and hydral as below   - Strict BP control given moderate AS  - Increased vascular congestion on CXR (3/9) so will give additional Lasix 20mg PO x1 (3/10)    # AFIB RVR   # Bifascicular Block with [employer] PPM   - AFIB RVR episodes and PPM interrogated (2/20) by EP with similar episodes  - Previous admission in 6/2022 with cardiogenic syncope second to bifasicular block, however now with PPM and AV myron blockers ok.   - Continue on lopressor 12.5mg BID however replaced with coreg second to HFrEF   - AC discussed at length however with recent GIB will hold for now     # HFrEF 38 likely stress induced?   # Mild to moderate AS   - ECHO (12/2023) with EF 62 with normal LVSF and mild to moderate AS   - ECHO (2/2024) with EF 38, moderate LVSD with global LV hypokinesis, mildly reduced RVSF (TAPSE 1.3), mild to moderate MR, mild AR, and moderate AS.   - RPT ECHO (3/4) with EF 45 and mild to moderate LVSD   - Continue on lasix 20 QD, coreg 6.25 and hydralazine 50 (increased 3/4)   - Hold isordil and up titrate above medications first     # CAD with CABG   - CATH (5/2022) with dLAD 70, SVG graft to OM1 with 90 in stent stenosis s/p PCI and GEMMA placement, and LIMA graft to mLAD with no disease.   - Continue on ASA and brilinta    # Prolonged QTC (Resolved)  - ECG (3/2) with QTC 616ms (corrected using bazzet 490ms)   - ECG (3/3) with QTC 634ms (corrected using bazzet 490ms)   - ECG (3/11) with QTC 490ms   - Monitor off/ avoid QTC prolonging agents for now    RESPIRATORY   # Acute respiratory failure second to SARS-COV-2 vs pulm edema vs PNA  - Presented with COVID complicated by sepsis second to acute lynsey and worsening respiratory failure second to pulmonary edema requiring intubation.   - Prolonged intubation and s/p tracheostomy (2/13)   - CT CHEST 1/16 with new small bilateral pleural effusions/ atelectasis/ patchy bilateral ground-glass opacities are consistent with pulmonary edema.  - Completed ABX and COVID regimen as below   - Continue on vent and initially tolerating some SBT 12/5  - Continue on nebs and hold further HTS given intermittent hemoptysis  - Continue on diuresis as above    # Mild hemoptysis likely from suction trauma   - s/p bronch (2/18) with no active bleed  - Hemoptysis improved with TXA and holding further HTS as above   - Tiny hemoptysis second to suction trauma imporving  - Careful with suctioning   - Recurrent hemoptysis (3/9) so ordered for TXA nebs again however hemoptysis appeared self limited and stopped before nebs even given    HEENT   # L eye subconjunctival hemorrhage   - Continue on artifical tears and Lacrilube   - Optho eval appreciated     GI  # Nutrition/ Dysphagia   - PEG placed by GI on 2/20 and tube feeds continued.   - Loose stools and Banatrol continued     # UGIB   - EGD (1/2) with esophagitis and bleeding Dieulafoy's lesion s/p clips.   - Continue on PPI and carafate     # SMA calcification   - CTA demonstrating mesenteric fat stranding associated with ascending/transverse colon  - Diagnostic laparoscopy (12/24) with small bowel and visible colon viable, some inflammation of omentum in RUQ  - SMA Angiogram and stent placed (12/25).   - Continue on ASA and Brilinta     # Acute Cholecystitis   - CT (12/26) with distended GB with cholelithiasis and sludge   - HIDA (12/29) POSITIVE for acute cholecystitis   - Case discussed with IR at length and s/p PERC LYNSEY (1/26)   - Completed zosyn course (12/24-12/31)   - PERC LYNSEY tube to stay in until surgical candidate for cholecystectomy to prevent recurrence     / RENAL   # CKD   - CRE at baseline   - Monitor renal function and UOP   - Aranesp SQ x1 (3/8)    # Hypernatremia (resolved)   - s/p  Q4H  - Monitor closely with diuresis as above       INFECTIOUS DISEASE   # COVID with superimposed PNA   # ESBL Kleb PNA   - COVID POSITIVE (12/23) and completed remdesivir x 1 dose and paxlovid course.   - WOB worsened as above requiring intubation and cultures negative. Zosyn completed empirically however hypothermic (2/11) and BCx, UA, RVP and BAL negative.   - Completed additional zosyn (2/12-2/19) empirically   - Fever spike and thick secretions and SCX (2/26) with ESBL klebs.   - s/p Zosyn (2/27 - 2/29) but resistant and completed Erta (2/29 - 3/6)     HEME  # AOCD   - Monitor HH   - DVT PPX with HSQ     ENDOCRINE   # DM2 A1C 9.3   - Continue on Lantus 14 and ISS     SKIN/ TUBES   - Trach (2/13)   - PEG (2/20)   - PERC LYNSEY (1/26 - )     ETHICS   - FULL CODE     DISPO - vent facility and family aware. SW with accepting facility however pending available bed.

## 2024-03-14 NOTE — CHART NOTE - NSCHARTNOTEFT_GEN_A_CORE
Patient being seen for malnutrition follow up. Spoke with nursing staff and obtained subjective information from extensive chart review.     Current Diet : Diet, NPO with Tube Feed:   Tube Feeding Modality: Gastrostomy  Glucerna 1.5 Prince (GLUCERNA1.5RTH)  Total Volume for 24 Hours (mL): 1200  Continuous  Starting Tube Feed Rate {mL per Hour}: 50  Until Goal Tube Feed Rate (mL per Hour): 50  Tube Feed Duration (in Hours): 24  Tube Feed Start Time: 09:30  Banatrol TF     Qty per Day:  2 (02-24-24 @ 13:44)    TF Provides:  1800 kcals  92 gms protein  911 mL free H2O  1200 mL total volume    Anthropometrics: Height (cm): 175.3 (01-19)  Admit Weight (kg): 79.3 (01-19)  BMI (kg/m2): 25.8 (01-19)  IBW (kg): 72.7    Weight Trend: 82.7 kg (3/10), 85.4 kg (3/3), 82 kg (2/25), 79.2 kg (2/15), 82 kg (1/31)    Nutrition Interval Events: Pt continues to tolerate TF at this time (no nausea/vomiting or abdominal distention); fecal incontinence with last BM 3/14. Weight trend reflective of fluid shifts with edema presently 2+ R foot. FS over the past 24 hrs 130 - 233 mg/dl with Lantus and Admelog insulins ordered for coverage. Pt remains at severe risk for malnutrition based on inadequate nutrition intake with fluid accumulation which had been identified earlier during this admission. Suggest continuing with nutrition plan of care as ordered at this time. RDN services to remain available as needed.     __________________ Pertinent Medications__________________   MEDICATIONS  (STANDING):  albuterol/ipratropium for Nebulization 3 milliLiter(s) Nebulizer every 12 hours  artificial  tears Solution 1 Drop(s) Left EYE four times a day  aspirin  chewable 81 milliGRAM(s) Enteral Tube daily  carvedilol 6.25 milliGRAM(s) Oral every 12 hours  chlorhexidine 0.12% Liquid 15 milliLiter(s) Oral Mucosa every 12 hours  chlorhexidine 2% Cloths 1 Application(s) Topical daily  dextrose 5%. 1000 milliLiter(s) (100 mL/Hr) IV Continuous <Continuous>  dextrose 5%. 1000 milliLiter(s) (50 mL/Hr) IV Continuous <Continuous>  dextrose 50% Injectable 25 Gram(s) IV Push once  dextrose 50% Injectable 25 Gram(s) IV Push once  dextrose 50% Injectable 12.5 Gram(s) IV Push once  doxazosin 2 milliGRAM(s) Oral at bedtime  escitalopram Solution 10 milliGRAM(s) Oral daily  furosemide    Tablet 20 milliGRAM(s) Oral daily  glucagon  Injectable 1 milliGRAM(s) IntraMuscular once  heparin   Injectable 5000 Unit(s) SubCutaneous every 8 hours  hydrALAZINE 25 milliGRAM(s) Oral every 8 hours  insulin glargine Injectable (LANTUS) 14 Unit(s) SubCutaneous <User Schedule>  insulin lispro (ADMELOG) corrective regimen sliding scale   SubCutaneous every 6 hours  levETIRAcetam  Solution 250 milliGRAM(s) Oral two times a day  melatonin 6 milliGRAM(s) Oral at bedtime  pantoprazole   Suspension 40 milliGRAM(s) Oral every 12 hours  petrolatum Ophthalmic Ointment 1 Application(s) Left EYE at bedtime  polyethylene glycol 3350 17 Gram(s) Oral daily  senna Syrup 10 milliLiter(s) Oral at bedtime  sucralfate suspension 1 Gram(s) Enteral Tube two times a day  ticagrelor 60 milliGRAM(s) Oral every 12 hours  valproic  acid Syrup 250 milliGRAM(s) Oral two times a day  valproic  acid Syrup 125 milliGRAM(s) Oral <User Schedule>    __________________ Pertinent Labs__________________   03-14 Na138 mmol/L Glu 154 mg/dL<H> K+ 4.3 mmol/L Cr  1.45 mg/dL<H> BUN 49 mg/dL<H> 03-14 Phos 3.4 mg/dL 03-13 Alb 2.6 g/dL<L>      Skin: Intact    Estimated Needs (based on IBW - 72.7 kg):     1818 - 2181 kcals/day (25-30 kcals/kg)  87 - 102 gms protein/day (1.2-1.4 gms protein/kg)      Nutrition Diagnosis: Severe malnutrition  [x] ongoing    Goal(s):  1. Patient to meet > 75% estimated energy needs    Recommendations:   1. Continue with nutrition plan of care as ordered.    Monitoring and Evaluation:   1. Monitor weights, labs, BMs, skin integrity, enteral tolerance and edema.  2. RD services to remain available.     Education:    [x] Not warranted at present    Amisha Driscoll, MS, RDN, CDN, CNSC on MS TEAMS PREFERRED or Pager #[phone]

## 2024-03-14 NOTE — PROGRESS NOTE ADULT - SUBJECTIVE AND OBJECTIVE BOX
CHIEF COMPLAINT: Patient is a 89y old  Male who presents with a chief complaint of COVID19, Chest pain (13 Mar 2024 14:21)      INTERVAL EVENTS: No overnight events.    ROS: Seen by bedside during AM rounds     OBJECTIVE:  ICU Vital Signs Last 24 Hrs  T(C): 36.6 (14 Mar 2024 04:00), Max: 36.9 (13 Mar 2024 08:00)  T(F): 97.9 (14 Mar 2024 04:00), Max: 98.4 (13 Mar 2024 08:00)  HR: 78 (14 Mar 2024 07:06) (78 - 97)  BP: 107/47 (14 Mar 2024 04:00) (101/47 - 125/44)  BP(mean): --  ABP: --  ABP(mean): --  RR: 20 (14 Mar 2024 04:00) (14 - 20)  SpO2: 92% (14 Mar 2024 07:06) (92% - 99%)    O2 Parameters below as of 14 Mar 2024 04:00  Patient On (Oxygen Delivery Method): ventilator, AC    O2 Concentration (%): 60      Mode: AC/ CMV (Assist Control/ Continuous Mandatory Ventilation), RR (machine): 12, TV (machine): 450, FiO2: 60, PEEP: 5, MAP: 10, PIP: 36    03-13 @ 07:01  -  03-14 @ 07:00  --------------------------------------------------------  IN: 1650 mL / OUT: 1722 mL / NET: -72 mL      CAPILLARY BLOOD GLUCOSE      POCT Blood Glucose.: 160 mg/dL (14 Mar 2024 05:14)      PHYSICAL EXAM:  General:   HEENT:   Lymph Nodes:  Neck:   Respiratory:   Cardiovascular:   Abdomen:   Extremities:   Skin:   Neurological:  Psychiatry:    Mode: AC/ CMV (Assist Control/ Continuous Mandatory Ventilation)  RR (machine): 12  TV (machine): 450  FiO2: 60  PEEP: 5  MAP: 10  PIP: 36      HOSPITAL MEDICATIONS:  MEDICATIONS  (STANDING):  albuterol/ipratropium for Nebulization 3 milliLiter(s) Nebulizer every 12 hours  artificial  tears Solution 1 Drop(s) Left EYE four times a day  aspirin  chewable 81 milliGRAM(s) Enteral Tube daily  carvedilol 6.25 milliGRAM(s) Oral every 12 hours  chlorhexidine 0.12% Liquid 15 milliLiter(s) Oral Mucosa every 12 hours  chlorhexidine 2% Cloths 1 Application(s) Topical daily  dextrose 5%. 1000 milliLiter(s) (100 mL/Hr) IV Continuous <Continuous>  dextrose 5%. 1000 milliLiter(s) (50 mL/Hr) IV Continuous <Continuous>  dextrose 50% Injectable 25 Gram(s) IV Push once  dextrose 50% Injectable 12.5 Gram(s) IV Push once  dextrose 50% Injectable 25 Gram(s) IV Push once  doxazosin 2 milliGRAM(s) Oral at bedtime  escitalopram Solution 10 milliGRAM(s) Oral daily  furosemide    Tablet 20 milliGRAM(s) Oral daily  glucagon  Injectable 1 milliGRAM(s) IntraMuscular once  heparin   Injectable 5000 Unit(s) SubCutaneous every 8 hours  hydrALAZINE 50 milliGRAM(s) Oral every 8 hours  insulin glargine Injectable (LANTUS) 14 Unit(s) SubCutaneous <User Schedule>  insulin lispro (ADMELOG) corrective regimen sliding scale   SubCutaneous every 6 hours  levETIRAcetam  Solution 250 milliGRAM(s) Oral two times a day  melatonin 6 milliGRAM(s) Oral at bedtime  pantoprazole   Suspension 40 milliGRAM(s) Oral every 12 hours  petrolatum Ophthalmic Ointment 1 Application(s) Left EYE at bedtime  polyethylene glycol 3350 17 Gram(s) Oral daily  senna Syrup 10 milliLiter(s) Oral at bedtime  sucralfate suspension 1 Gram(s) Enteral Tube two times a day  ticagrelor 60 milliGRAM(s) Oral every 12 hours  valproic  acid Syrup 250 milliGRAM(s) Oral two times a day    MEDICATIONS  (PRN):  acetaminophen   Oral Liquid .. 650 milliGRAM(s) Oral every 6 hours PRN Temp greater or equal to 38C (100.4F), Mild Pain (1 - 3), Moderate Pain (4 - 6)  dextrose Oral Gel 15 Gram(s) Oral once PRN Blood Glucose LESS THAN 70 milliGRAM(s)/deciliter  haloperidol    Injectable 0.25 milliGRAM(s) IV Push every 6 hours PRN for severe agitation  tranexamic acid Injectable for Nebulization 500 milliGRAM(s) Inhalation every 8 hours PRN hemoptysis      LABS:                        8.3    7.49  )-----------( 293      ( 14 Mar 2024 05:20 )             26.0     03-14    138  |  97<L>  |  49<H>  ----------------------------<  154<H>  4.3   |  30  |  1.45<H>    Ca    8.4      14 Mar 2024 05:20  Phos  3.4     03-14  Mg     2.40     03-14    TPro  6.7  /  Alb  2.6<L>  /  TBili  0.3  /  DBili  <0.2  /  AST  21  /  ALT  18  /  AlkPhos  149<H>  03-13      Urinalysis Basic - ( 14 Mar 2024 05:20 )    Color: x / Appearance: x / SG: x / pH: x  Gluc: 154 mg/dL / Ketone: x  / Bili: x / Urobili: x   Blood: x / Protein: x / Nitrite: x   Leuk Esterase: x / RBC: x / WBC x   Sq Epi: x / Non Sq Epi: x / Bacteria: x         CHIEF COMPLAINT: Patient is a 89y old  Male who presents with a chief complaint of COVID19, Chest pain (13 Mar 2024 14:21)      INTERVAL EVENTS: No overnight events.    ROS: Seen by bedside during AM rounds. Unable to obtain 2/2 mental status.    OBJECTIVE:  ICU Vital Signs Last 24 Hrs  T(C): 36.6 (14 Mar 2024 04:00), Max: 36.9 (13 Mar 2024 08:00)  T(F): 97.9 (14 Mar 2024 04:00), Max: 98.4 (13 Mar 2024 08:00)  HR: 78 (14 Mar 2024 07:06) (78 - 97)  BP: 107/47 (14 Mar 2024 04:00) (101/47 - 125/44)  BP(mean): --  ABP: --  ABP(mean): --  RR: 20 (14 Mar 2024 04:00) (14 - 20)  SpO2: 92% (14 Mar 2024 07:06) (92% - 99%)    O2 Parameters below as of 14 Mar 2024 04:00  Patient On (Oxygen Delivery Method): ventilator, AC    O2 Concentration (%): 60      Mode: AC/ CMV (Assist Control/ Continuous Mandatory Ventilation), RR (machine): 12, TV (machine): 450, FiO2: 60, PEEP: 5, MAP: 10, PIP: 36    03-13 @ 07:01  -  03-14 @ 07:00  --------------------------------------------------------  IN: 1650 mL / OUT: 1722 mL / NET: -72 mL      CAPILLARY BLOOD GLUCOSE      POCT Blood Glucose.: 160 mg/dL (14 Mar 2024 05:14)      PHYSICAL EXAM:  General: NAD  Neck: no JVD, trach to vent, site c/d/i  Respiratory: lungs clear to ausculation b/l, no accessory muscle use, no rales/rhonchi/wheeze  Cardiovascular: normal s1 s2 reg rate and rhythm   Abdomen: soft and non tender, +PEG in place  : no groin rash noted, examined with ANNY Eagle as chaperone   Extremities: no LE edema, in SCDs   Skin: warm, dry  Neurological: Awake and alert, eyes track, does not follow commands, delirious per son at bedside   Psychiatry: no agitation       Mode: AC/ CMV (Assist Control/ Continuous Mandatory Ventilation)  RR (machine): 12  TV (machine): 450  FiO2: 60  PEEP: 5  MAP: 10  PIP: 36      HOSPITAL MEDICATIONS:  MEDICATIONS  (STANDING):  albuterol/ipratropium for Nebulization 3 milliLiter(s) Nebulizer every 12 hours  artificial  tears Solution 1 Drop(s) Left EYE four times a day  aspirin  chewable 81 milliGRAM(s) Enteral Tube daily  carvedilol 6.25 milliGRAM(s) Oral every 12 hours  chlorhexidine 0.12% Liquid 15 milliLiter(s) Oral Mucosa every 12 hours  chlorhexidine 2% Cloths 1 Application(s) Topical daily  dextrose 5%. 1000 milliLiter(s) (100 mL/Hr) IV Continuous <Continuous>  dextrose 5%. 1000 milliLiter(s) (50 mL/Hr) IV Continuous <Continuous>  dextrose 50% Injectable 25 Gram(s) IV Push once  dextrose 50% Injectable 12.5 Gram(s) IV Push once  dextrose 50% Injectable 25 Gram(s) IV Push once  doxazosin 2 milliGRAM(s) Oral at bedtime  escitalopram Solution 10 milliGRAM(s) Oral daily  furosemide    Tablet 20 milliGRAM(s) Oral daily  glucagon  Injectable 1 milliGRAM(s) IntraMuscular once  heparin   Injectable 5000 Unit(s) SubCutaneous every 8 hours  hydrALAZINE 50 milliGRAM(s) Oral every 8 hours  insulin glargine Injectable (LANTUS) 14 Unit(s) SubCutaneous <User Schedule>  insulin lispro (ADMELOG) corrective regimen sliding scale   SubCutaneous every 6 hours  levETIRAcetam  Solution 250 milliGRAM(s) Oral two times a day  melatonin 6 milliGRAM(s) Oral at bedtime  pantoprazole   Suspension 40 milliGRAM(s) Oral every 12 hours  petrolatum Ophthalmic Ointment 1 Application(s) Left EYE at bedtime  polyethylene glycol 3350 17 Gram(s) Oral daily  senna Syrup 10 milliLiter(s) Oral at bedtime  sucralfate suspension 1 Gram(s) Enteral Tube two times a day  ticagrelor 60 milliGRAM(s) Oral every 12 hours  valproic  acid Syrup 250 milliGRAM(s) Oral two times a day    MEDICATIONS  (PRN):  acetaminophen   Oral Liquid .. 650 milliGRAM(s) Oral every 6 hours PRN Temp greater or equal to 38C (100.4F), Mild Pain (1 - 3), Moderate Pain (4 - 6)  dextrose Oral Gel 15 Gram(s) Oral once PRN Blood Glucose LESS THAN 70 milliGRAM(s)/deciliter  haloperidol    Injectable 0.25 milliGRAM(s) IV Push every 6 hours PRN for severe agitation  tranexamic acid Injectable for Nebulization 500 milliGRAM(s) Inhalation every 8 hours PRN hemoptysis      LABS:                        8.3    7.49  )-----------( 293      ( 14 Mar 2024 05:20 )             26.0     03-14    138  |  97<L>  |  49<H>  ----------------------------<  154<H>  4.3   |  30  |  1.45<H>    Ca    8.4      14 Mar 2024 05:20  Phos  3.4     03-14  Mg     2.40     03-14    TPro  6.7  /  Alb  2.6<L>  /  TBili  0.3  /  DBili  <0.2  /  AST  21  /  ALT  18  /  AlkPhos  149<H>  03-13      Urinalysis Basic - ( 14 Mar 2024 05:20 )    Color: x / Appearance: x / SG: x / pH: x  Gluc: 154 mg/dL / Ketone: x  / Bili: x / Urobili: x   Blood: x / Protein: x / Nitrite: x   Leuk Esterase: x / RBC: x / WBC x   Sq Epi: x / Non Sq Epi: x / Bacteria: x

## 2024-03-15 LAB
ALBUMIN SERPL ELPH-MCNC: 3 G/DL — LOW (ref 3.3–5)
ALP SERPL-CCNC: 173 U/L — HIGH (ref 40–120)
ALT FLD-CCNC: 17 U/L — SIGNIFICANT CHANGE UP (ref 4–41)
AMMONIA BLD-MCNC: 58 UMOL/L — HIGH (ref 11–55)
ANION GAP SERPL CALC-SCNC: 12 MMOL/L — SIGNIFICANT CHANGE UP (ref 7–14)
AST SERPL-CCNC: 21 U/L — SIGNIFICANT CHANGE UP (ref 4–40)
BILIRUB SERPL-MCNC: 0.3 MG/DL — SIGNIFICANT CHANGE UP (ref 0.2–1.2)
BLOOD GAS ARTERIAL - LYTES,HGB,ICA,LACT RESULT: SIGNIFICANT CHANGE UP
BUN SERPL-MCNC: 45 MG/DL — HIGH (ref 7–23)
CALCIUM SERPL-MCNC: 8.8 MG/DL — SIGNIFICANT CHANGE UP (ref 8.4–10.5)
CHLORIDE SERPL-SCNC: 96 MMOL/L — LOW (ref 98–107)
CO2 SERPL-SCNC: 28 MMOL/L — SIGNIFICANT CHANGE UP (ref 22–31)
CREAT SERPL-MCNC: 1.34 MG/DL — HIGH (ref 0.5–1.3)
EGFR: 50 ML/MIN/1.73M2 — LOW
GLUCOSE BLDC GLUCOMTR-MCNC: 148 MG/DL — HIGH (ref 70–99)
GLUCOSE BLDC GLUCOMTR-MCNC: 150 MG/DL — HIGH (ref 70–99)
GLUCOSE BLDC GLUCOMTR-MCNC: 161 MG/DL — HIGH (ref 70–99)
GLUCOSE BLDC GLUCOMTR-MCNC: 165 MG/DL — HIGH (ref 70–99)
GLUCOSE BLDC GLUCOMTR-MCNC: 177 MG/DL — HIGH (ref 70–99)
GLUCOSE SERPL-MCNC: 163 MG/DL — HIGH (ref 70–99)
MAGNESIUM SERPL-MCNC: 2.4 MG/DL — SIGNIFICANT CHANGE UP (ref 1.6–2.6)
PHOSPHATE SERPL-MCNC: 3.4 MG/DL — SIGNIFICANT CHANGE UP (ref 2.5–4.5)
POTASSIUM SERPL-MCNC: 4.6 MMOL/L — SIGNIFICANT CHANGE UP (ref 3.5–5.3)
POTASSIUM SERPL-SCNC: 4.6 MMOL/L — SIGNIFICANT CHANGE UP (ref 3.5–5.3)
PROT SERPL-MCNC: 7.2 G/DL — SIGNIFICANT CHANGE UP (ref 6–8.3)
SODIUM SERPL-SCNC: 136 MMOL/L — SIGNIFICANT CHANGE UP (ref 135–145)
VALPROATE SERPL-MCNC: 28.3 UG/ML — LOW (ref 50–100)

## 2024-03-15 PROCEDURE — 71045 X-RAY EXAM CHEST 1 VIEW: CPT | Mod: 26

## 2024-03-15 PROCEDURE — 99233 SBSQ HOSP IP/OBS HIGH 50: CPT | Mod: FS

## 2024-03-15 PROCEDURE — 99232 SBSQ HOSP IP/OBS MODERATE 35: CPT

## 2024-03-15 PROCEDURE — 93010 ELECTROCARDIOGRAM REPORT: CPT

## 2024-03-15 RX ORDER — OLANZAPINE 15 MG/1
1.25 TABLET, FILM COATED ORAL ONCE
Refills: 0 | Status: COMPLETED | OUTPATIENT
Start: 2024-03-15 | End: 2024-03-15

## 2024-03-15 RX ORDER — VALPROIC ACID (AS SODIUM SALT) 250 MG/5ML
125 SOLUTION, ORAL ORAL EVERY 8 HOURS
Refills: 0 | Status: DISCONTINUED | OUTPATIENT
Start: 2024-03-15 | End: 2024-03-27

## 2024-03-15 RX ORDER — VALPROIC ACID (AS SODIUM SALT) 250 MG/5ML
375 SOLUTION, ORAL ORAL
Refills: 0 | Status: DISCONTINUED | OUTPATIENT
Start: 2024-03-15 | End: 2024-03-15

## 2024-03-15 RX ORDER — LACTULOSE 10 G/15ML
20 SOLUTION ORAL ONCE
Refills: 0 | Status: COMPLETED | OUTPATIENT
Start: 2024-03-15 | End: 2024-03-15

## 2024-03-15 RX ADMIN — LEVETIRACETAM 250 MILLIGRAM(S): 250 TABLET, FILM COATED ORAL at 05:11

## 2024-03-15 RX ADMIN — Medication 25 MILLIGRAM(S): at 05:09

## 2024-03-15 RX ADMIN — LEVETIRACETAM 250 MILLIGRAM(S): 250 TABLET, FILM COATED ORAL at 17:30

## 2024-03-15 RX ADMIN — PANTOPRAZOLE SODIUM 40 MILLIGRAM(S): 20 TABLET, DELAYED RELEASE ORAL at 05:11

## 2024-03-15 RX ADMIN — HEPARIN SODIUM 5000 UNIT(S): 5000 INJECTION INTRAVENOUS; SUBCUTANEOUS at 13:19

## 2024-03-15 RX ADMIN — Medication 1 GRAM(S): at 17:29

## 2024-03-15 RX ADMIN — Medication 1 DROP(S): at 11:30

## 2024-03-15 RX ADMIN — Medication 2: at 17:28

## 2024-03-15 RX ADMIN — ESCITALOPRAM OXALATE 10 MILLIGRAM(S): 10 TABLET, FILM COATED ORAL at 11:29

## 2024-03-15 RX ADMIN — OLANZAPINE 1.25 MILLIGRAM(S): 15 TABLET, FILM COATED ORAL at 01:03

## 2024-03-15 RX ADMIN — Medication 2 MILLIGRAM(S): at 21:16

## 2024-03-15 RX ADMIN — TICAGRELOR 60 MILLIGRAM(S): 90 TABLET ORAL at 17:30

## 2024-03-15 RX ADMIN — Medication 3 MILLILITER(S): at 19:33

## 2024-03-15 RX ADMIN — Medication 125 MILLIGRAM(S): at 21:17

## 2024-03-15 RX ADMIN — TICAGRELOR 60 MILLIGRAM(S): 90 TABLET ORAL at 05:10

## 2024-03-15 RX ADMIN — Medication 2: at 23:23

## 2024-03-15 RX ADMIN — Medication 1 DROP(S): at 23:23

## 2024-03-15 RX ADMIN — Medication 1 DROP(S): at 17:28

## 2024-03-15 RX ADMIN — CARVEDILOL PHOSPHATE 6.25 MILLIGRAM(S): 80 CAPSULE, EXTENDED RELEASE ORAL at 05:11

## 2024-03-15 RX ADMIN — HEPARIN SODIUM 5000 UNIT(S): 5000 INJECTION INTRAVENOUS; SUBCUTANEOUS at 05:10

## 2024-03-15 RX ADMIN — Medication 3 MILLILITER(S): at 06:20

## 2024-03-15 RX ADMIN — Medication 25 MILLIGRAM(S): at 13:20

## 2024-03-15 RX ADMIN — CHLORHEXIDINE GLUCONATE 15 MILLILITER(S): 213 SOLUTION TOPICAL at 17:30

## 2024-03-15 RX ADMIN — Medication 250 MILLIGRAM(S): at 05:10

## 2024-03-15 RX ADMIN — CHLORHEXIDINE GLUCONATE 1 APPLICATION(S): 213 SOLUTION TOPICAL at 11:29

## 2024-03-15 RX ADMIN — Medication 1 GRAM(S): at 05:10

## 2024-03-15 RX ADMIN — Medication 0.5 MILLIGRAM(S): at 17:28

## 2024-03-15 RX ADMIN — CHLORHEXIDINE GLUCONATE 15 MILLILITER(S): 213 SOLUTION TOPICAL at 05:12

## 2024-03-15 RX ADMIN — POLYETHYLENE GLYCOL 3350 17 GRAM(S): 17 POWDER, FOR SOLUTION ORAL at 11:29

## 2024-03-15 RX ADMIN — Medication 125 MILLIGRAM(S): at 13:19

## 2024-03-15 RX ADMIN — INSULIN GLARGINE 14 UNIT(S): 100 INJECTION, SOLUTION SUBCUTANEOUS at 11:30

## 2024-03-15 RX ADMIN — Medication 20 MILLIGRAM(S): at 05:11

## 2024-03-15 RX ADMIN — PANTOPRAZOLE SODIUM 40 MILLIGRAM(S): 20 TABLET, DELAYED RELEASE ORAL at 17:30

## 2024-03-15 RX ADMIN — Medication 1 DROP(S): at 05:16

## 2024-03-15 RX ADMIN — CARVEDILOL PHOSPHATE 6.25 MILLIGRAM(S): 80 CAPSULE, EXTENDED RELEASE ORAL at 17:31

## 2024-03-15 RX ADMIN — Medication 1 MILLIGRAM(S): at 09:07

## 2024-03-15 RX ADMIN — HEPARIN SODIUM 5000 UNIT(S): 5000 INJECTION INTRAVENOUS; SUBCUTANEOUS at 21:18

## 2024-03-15 RX ADMIN — Medication 2: at 05:12

## 2024-03-15 RX ADMIN — Medication 25 MILLIGRAM(S): at 21:17

## 2024-03-15 RX ADMIN — Medication 81 MILLIGRAM(S): at 11:29

## 2024-03-15 RX ADMIN — Medication 6 MILLIGRAM(S): at 21:16

## 2024-03-15 RX ADMIN — LACTULOSE 20 GRAM(S): 10 SOLUTION ORAL at 21:16

## 2024-03-15 NOTE — BH CONSULTATION LIAISON PROGRESS NOTE - NSBHASSESSMENTFT_PSY_ALL_CORE
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 Course ultimately complicated by progressive WOB and O2 demand and patient intubated and transferred to MICU (1/22). Course further complicated by acute cholecystitis and s/p Perc Lynsey with IR on (1/26), HFrEF 38, pulmonary edema, superimposed PNA, failed extubation failed and prolonged vent time and s/p trach (2/13). Patient transferred to RCU (2/16) and s/p PEG (2/20). Course complicated by volume overload and diuresis and GDMT continued and HFrEF 45 with improvement.  No known psychiatric history. Psychiatry consulted for agitation. He has been receiving PO and IM ativan several times per day, concern for oversedation. Son and DIL are physicians and requested consult.     Due to multiple medical comorbities and prolonged QTc, would hold off on antipsychotics and other QT prolonging agents.   Ativan is not an ideal PRN for this patient given deliriogenic effects and paradoxical activation.  Discussed with family plan to start low dose depakote for impulsivity and agitation and uptitrated as tolerated.    3/12: received ativan x2 overnight for ongoing agitation. LFTs wnl. Will continue to increase depakote and monitor. Would recommend cross tapering with keppra as keppra may be contributing to agitated delirium. Discussed with family.     3/14: some improvement in agitation with depakote, repeat EKG with QTc <500, still proceed with caution with antipsychotics, though antipsychotics are preferable to benzos for agitation in delirium    3/15: still requiring PRNs, appears to respond better to ativan than to olanzapine    Plan:  - INCREASE depakote to 250mg PO qAM, 125mg at 1300, 375mg qHS for impulsivity and agitation  - ammonia level  - monitor albumin over the weekend; if albumin drops below 2.5, repeat VPA level  - would begin to taper keppra and utilize Depakote for both Keppra indications and impulsivity.  	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 Course ultimately complicated by progressive WOB and O2 demand and patient intubated and transferred to MICU (1/22). Course further complicated by acute cholecystitis and s/p Perc Lynsey with IR on (1/26), HFrEF 38, pulmonary edema, superimposed PNA, failed extubation failed and prolonged vent time and s/p trach (2/13). Patient transferred to RCU (2/16) and s/p PEG (2/20). Course complicated by volume overload and diuresis and GDMT continued and HFrEF 45 with improvement.  No known psychiatric history. Psychiatry consulted for agitation. He has been receiving PO and IM ativan several times per day, concern for oversedation. Son and DIL are physicians and requested consult.     Due to multiple medical comorbities and prolonged QTc, would hold off on antipsychotics and other QT prolonging agents.   Ativan is not an ideal PRN for this patient given deliriogenic effects and paradoxical activation.  Discussed with family plan to start low dose depakote for impulsivity and agitation and uptitrated as tolerated.    3/12: received ativan x2 overnight for ongoing agitation. LFTs wnl. Will continue to increase depakote and monitor. Would recommend cross tapering with keppra as keppra may be contributing to agitated delirium. Discussed with family.     3/14: some improvement in agitation with depakote, repeat EKG with QTc <500, still proceed with caution with antipsychotics, though antipsychotics are preferable to benzos for agitation in delirium    3/15: still requiring PRNs, appears to respond better to ativan than to olanzapine    Addendum: afternoon ammonia came back elevated- therefore recommending the following    Plan:  - DECREASE DEPAKOTE to 125mg TID given elevated ammonia for impulsivity and agitation  	[] repeat ammonia lab 3/16  	[] treat hyperammonemia per primary team (consider lactulose if medically appropriate)  	- if ammonia remains elevated- can call psych CL for further recs  	- monitor albumin over the weekend; if albumin drops below 2.5, repeat VPA level  - defer taper of keppra to primary team  - delirium precautions   - monitor LFTs, platelets, ammonia, VPA level   - of note, his VPA level will need to be adjusted for low albumin- please indicate this on DC summary  - monitor EKG,   - continue using ativan 0.5mg q6h PRN for severe agitation per RCU

## 2024-03-15 NOTE — PROGRESS NOTE ADULT - NS ATTEND AMEND GEN_ALL_CORE FT
Pt is a 89M with PMHx CAD s/p CABG/GEMMA (5/2022 on ASA/Brilinta) and bifascicular block s/p PPM (6/2022 Medtronic), pAFib, HTN/HLD, AoS (mild-moderate), and HTN/HLD presenting to [facility] on 12/23/23 with AMS 2/2 encephalopathy in the setting of COVD19 PNA further found to have SMA calcification s/p stent placement (12/25/23) with hospital course c/b UGIB s/p EGD (1/2/24) and failure to wean from ventilator s/p tracheostomy placement and transfer to RCU for further management.     Pt with persistent encephalopathy with intermittent episodes of confusion and disorientation with trach/ling pulling in the setting of prolonged hospitalization with critical illness but now showing s/s improvement. CT Head (1/31) without acute pathologic anatomy. EEG (2/6) without epileptiform discharges. On ASA/Brilinta from prior hx CVAs (without known neurologic deficits) with known hx severe b/l internal carotid narrowing without occlusions. On home SSRI, but pt continued to have periods of agitation with line/trach pulling unable to receive Seroquel 2/2 prolonged QTc. Limiting benzos, prn changed to zyprexa but it did not help. Haldol tried but there was concern for increased EPS type sx. Switched again to Ativan prn with appropriate clinical response. Will continue to uptitrate depakote. Decreased keppra simultaneously, as only in place as home medication for tremors/jerking (pt does not have any hx of seizure activity). Pt further remains physically weak and debilitated in the setting of prolonged critical illness with polyneuropathy. Will discuss with PT for possible OOB to chair.  CL Team consulted and recs appreciated.     Pt with cardiac hz as above now hemodynamically stable off vasopressors, on DMT as tolerated. TTE 2/24 with new biventricular failure with EF 38% and moderate MR/AS, repeat TTE 3/4 showing improved LVSF. Will c/w Lasix + carvedilol + hydralazine + statin, holding isordil for now with uptitration of current medications first. Will c/w ASA/Brilinta (GEMMA 5/22). Bedside POCUS performed, pt with normal lung aeration pattern without any PLEFs or evidence of RA/RV overload. Will hold diuresis for now with further daily bedside evaluations for volume status.  Cardiology following, recs appreciated.    Pt with acute hypoxemic respiratory failure 2/2 COVID19 +/- flash pulmonary edema with severe sepsis further c/b acute cholecystitis requiring ETT intubation/MV with failure to wean from ventilator s/p tracheostomy placement (2/13). Will c/w SBT trials as tolerated, pt doing well on 12/5 today (spent all day on 15/5 yesterday). Airway clearance therapy in place. Trach care and suctioning as per RCU team. Oral hygiene and aspiration precautions in place. Hemoptysis earlier in admission now resolved.     Pt with oropharyngeal dysphagia s/p PEG placement (GI, 2/20) now tolerating feeds at goal rate. Hospital course c/b acute cholecystitis s/p percutaneous asa (1/26), will remain until cholecystectomy. Abx course completed. SMA calcification initially found on CTA now s/p laparoscopy (12/24) with SMA angiogram and stent placement (12/25.) Will c/w DAPT. UGIB 2/2 esophagitis with bleeding Dieulafoy lesion s/p EGD (1/2) with clips. Will c/w PPI and Carafate. Hypernatremia resolved on free water, now held 2/2 concern for fluid overload. DMII well controlled on basal/bolus insulin with ISS and BGFS monitoring.     Pt with hospital course c/b COVID with superimposed PNA s/p tx followed by ESBL Klebsiella PNA s/p Zosyn followed by broadening to Ertapenem through 3/6. Now off Abx. Will CTM conservatively with low threshold to restart abx.     Dispo pending medical optimization. Pt full code. DVT ppx HSQ. Plan for dispo to LTCF with ventilator capabilities. Will continue to update pt and family throughout hospitalization.

## 2024-03-15 NOTE — PROGRESS NOTE ADULT - ASSESSMENT
IMPRESSION:  89M w/ DM2, HTN, CVA, PAD, CKD3, and CAD-CABG, 12/23/23 p/w COVID19 infection, c/b respiratory failure/hypotension; now s/p tracheostomy    (1)Renal - CKD3; superimposed mild prerenal azotemia - numbers fluctuating based on hemodynamic status, slightly higher   (2)CV - now off pressors and midodrine, BP improved/stable    (3)Pulm - vent-dependent   (4)ID - afebrile, s/p zosyn   (5)GI - s/p PEG (2/20)  (6)Hypernatremia - possibly due to diuresis, Na+ improved    RECOMMEND:   (1)a/w lasix 20 mg daily   (2)flush PEG as needed   (3)Continue meds for GFR 40-50ml/min  (4)S/p aranesp 60 mcg Sq x 1 3/12/24         IMPRESSION:  89M w/ DM2, HTN, CVA, PAD, CKD3, and CAD-CABG, 12/23/23 p/w COVID19 infection, c/b respiratory failure/hypotension; now s/p tracheostomy    (1)Renal - CKD3; superimposed mild prerenal azotemia - numbers fluctuating based on hemodynamic status, slightly higher   (2)CV - now off pressors and midodrine, BP improved/stable    (3)Pulm - vent-dependent   (4)ID - afebrile, s/p zosyn   (5)GI - s/p PEG (2/20)  (6)Hypernatremia - possibly due to diuresis, Na+ improved    RECOMMEND:   (1)a/w lasix 20 mg iv PRN   (2)flush PEG as needed   (3)Continue meds for GFR 40-50ml/min  (4)S/p aranesp 60 mcg Sq x 1 3/12/24      [facility]  [facility]  Office: [phone]

## 2024-03-15 NOTE — CHART NOTE - NSCHARTNOTEFT_GEN_A_CORE
Called by RN This morning reporting patient with high pPeaks on vent. Patient assessed at bedside. Noted to be agitated at times during hospital stay but calm appearing during my assessment. Sitter at bedside. pPeaks ranging 42-50 at highest with fluctuating VTs 400-510mls. Paitent suctioned however minimal secretions via in line suctioning however some resistance noted. Inner cannula removed, some secretions in lumen but not heavy appearing, new cannula placed into trach. Noted with some expiratory wheezing b/l on auscultation. Per RT at bedside, pt has already received neb treatment this morning. Placed back on vent and pPeaks returned to 30s. Will hold off sedation for now and monitor closely. Called by RN This morning reporting patient with high pPeaks on vent. Patient assessed at bedside. Noted to be agitated at times during hospital stay but calm appearing during my assessment. Sitter at bedside. pPeaks ranging 42-50 at highest with fluctuating VTs 400-510mls. Paitent suctioned however minimal secretions via in line suctioning however some resistance noted. Inner cannula removed, some secretions in lumen but not heavy appearing, new cannula placed into trach. Noted with some expiratory wheezing b/l on auscultation. Per RT at bedside, pt has already received neb treatment this morning. Placed back on vent and pPeaks returned to 30s. pPlateaus ~ high 20s. Will obtain CXR to r/o lung abnormality.  Will hold off sedation for now and monitor closely.

## 2024-03-15 NOTE — PROGRESS NOTE ADULT - ASSESSMENT
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 Course ultimately complicated by progressive WOB and O2 demand and patient intubated and transferred to MICU (1/22). Course further complicated by acute cholecystitis and s/p Perc Lynsey with IR on (1/26), HFrEF 38, pulmonary edema, superimposed PNA, failed extubation failed and prolonged vent time and s/p trach (2/13). Patient transferred to RCU (2/16) and s/p PEG (2/20). Course complicated by volume overload and diuresis and GDMT continued and HFrEF 45 with improvement    NEUROLOGY   # Encephalopathy   - Hx of CVA with no residual deficits per family, however per family worsening confusion at home over the past 6 months (becoming disoriented to time) but prior to admission has been more confused, talking about seeing people that are not present.  - CTH (1/31) with no evidence of acute infarct  - Continue on ASA and Brilinta  - Holding Neurontin, Memantine and Oxycodone in setting of somnolence    # ICA stenosis   - CTA NECK (1/31) with moderate to severe narrowing left internal carotid at the origin. Severe narrowing right internal carotid by a tandem lesion 1.5 cm above the bifurcation with a narrowed internal carotid beyond the lesion. Extensive calcified plaque both cavernous and supraclinoid carotid arteries with narrowing but no occlusion. Mild narrowing proximal right M1 segment. Moderate narrowing both vertebral V4 segments. No perfusion abnormality at the Tmax 6 second threshold, but moderate hypoperfusion in the right MCA territory at the 4 second threshold.   - Continue on ASA and Brilinta    # Myoclonic jerks   - Hx of myoclonic jerks post CVAs   - EEG (1/18-2/6) negative for seizures  - Continue on Keppra and per neuro/ psych wishes to wean, family in agreement   - Currently down titrating Keppra 500 mg BID, now Keppra 250 mg BID 3/13 - )    # Depression/ Insomnia  - Continue on Lexapro per home medication   - Course complicated by agitation and pulling on tubes   - Home Seroquel discontinued as patient now on Valproic acid syrup   - S/p Ativan 0.5mg PO Q6H PRN and Haldol 0.25 mg Q6H for severe agitation, now IM Zypexa 1.25 Q8H PRN for agitation    - Supportive care     # Agitation in setting of delirium / underlying vascular dementia   - 3/11 Psychiatry consulted, started Depakote 125 mg PO BID, increased to 250 mg PO BID 3/12  and additional 125 mg PO QD added at 1300 on 3/14   - monitor platelets, LFTs while on Depakote     CARDIOLOGY   # Vasoplegic vs septic shock  # Hx of HTN   - Weaned off pressors in ICU and now with mild hypertension   - Continue on coreg and hydral as below   - Strict BP control given moderate AS  - Increased vascular congestion on CXR (3/9) so will give additional Lasix 20mg PO x1 (3/10)    # AFIB RVR   # Bifascicular Block with [employer]tronic PPM   - AFIB RVR episodes and PPM interrogated (2/20) by EP with similar episodes  - Previous admission in 6/2022 with cardiogenic syncope second to bifasicular block, however now with PPM and AV myron blockers ok.   - Continue on lopressor 12.5mg BID however replaced with coreg second to HFrEF   - AC discussed at length however with recent GIB will hold for now     # HFrEF 38 likely stress induced?   # Mild to moderate AS   - ECHO (12/2023) with EF 62 with normal LVSF and mild to moderate AS   - ECHO (2/2024) with EF 38, moderate LVSD with global LV hypokinesis, mildly reduced RVSF (TAPSE 1.3), mild to moderate MR, mild AR, and moderate AS.   - RPT ECHO (3/4) with EF 45 and mild to moderate LVSD   - Continue on lasix 20 QD, coreg 6.25 and hydralazine 50 (increased 3/4)   - Hold isordil and up titrate above medications first     # CAD with CABG   - CATH (5/2022) with dLAD 70, SVG graft to OM1 with 90 in stent stenosis s/p PCI and GEMMA placement, and LIMA graft to mLAD with no disease.   - Continue on ASA and brilinta    # Prolonged QTC (Resolved)  - ECG (3/2) with QTC 616ms (corrected using bazzet 490ms)   - ECG (3/3) with QTC 634ms (corrected using bazzet 490ms)   - ECG (3/11) with QTC 490ms   - Monitor off/ avoid QTC prolonging agents for now    RESPIRATORY   # Acute respiratory failure second to SARS-COV-2 vs pulm edema vs PNA  - Presented with COVID complicated by sepsis second to acute lynsey and worsening respiratory failure second to pulmonary edema requiring intubation.   - Prolonged intubation and s/p tracheostomy (2/13)   - CT CHEST 1/16 with new small bilateral pleural effusions/ atelectasis/ patchy bilateral ground-glass opacities are consistent with pulmonary edema.  - Completed ABX and COVID regimen as below   - Continue on vent and initially tolerating some SBT 12/5  - Continue on nebs and hold further HTS given intermittent hemoptysis  - Continue on diuresis as above    # Mild hemoptysis likely from suction trauma   - s/p bronch (2/18) with no active bleed  - Hemoptysis improved with TXA and holding further HTS as above   - Tiny hemoptysis second to suction trauma imporving  - Careful with suctioning   - Recurrent hemoptysis (3/9) so ordered for TXA nebs again however hemoptysis appeared self limited and stopped before nebs even given    HEENT   # L eye subconjunctival hemorrhage   - Continue on artifical tears and Lacrilube   - Optho eval appreciated     GI  # Nutrition/ Dysphagia   - PEG placed by GI on 2/20 and tube feeds continued.   - Loose stools and Banatrol continued     # UGIB   - EGD (1/2) with esophagitis and bleeding Dieulafoy's lesion s/p clips.   - Continue on PPI and carafate     # SMA calcification   - CTA demonstrating mesenteric fat stranding associated with ascending/transverse colon  - Diagnostic laparoscopy (12/24) with small bowel and visible colon viable, some inflammation of omentum in RUQ  - SMA Angiogram and stent placed (12/25).   - Continue on ASA and Brilinta     # Acute Cholecystitis   - CT (12/26) with distended GB with cholelithiasis and sludge   - HIDA (12/29) POSITIVE for acute cholecystitis   - Case discussed with IR at length and s/p PERC LYNSEY (1/26)   - Completed zosyn course (12/24-12/31)   - PERC LYNSEY tube to stay in until surgical candidate for cholecystectomy to prevent recurrence     / RENAL   # CKD   - CRE at baseline   - Monitor renal function and UOP   - Aranesp SQ x1 (3/8)    # Hypernatremia (resolved)   - s/p  Q4H  - Monitor closely with diuresis as above       INFECTIOUS DISEASE   # COVID with superimposed PNA   # ESBL Kleb PNA   - COVID POSITIVE (12/23) and completed remdesivir x 1 dose and paxlovid course.   - WOB worsened as above requiring intubation and cultures negative. Zosyn completed empirically however hypothermic (2/11) and BCx, UA, RVP and BAL negative.   - Completed additional zosyn (2/12-2/19) empirically   - Fever spike and thick secretions and SCX (2/26) with ESBL klebs.   - s/p Zosyn (2/27 - 2/29) but resistant and completed Erta (2/29 - 3/6)     HEME  # AOCD   - Monitor HH   - DVT PPX with HSQ     ENDOCRINE   # DM2 A1C 9.3   - Continue on Lantus 14 and ISS     SKIN/ TUBES   - Trach (2/13)   - PEG (2/20)   - PERC LYNSEY (1/26 - )     ETHICS   - FULL CODE     DISPO - vent facility and family aware. SW with accepting facility however pending available bed.  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 Course ultimately complicated by progressive WOB and O2 demand and patient intubated and transferred to MICU (1/22). Course further complicated by acute cholecystitis and s/p Perc Lynsey with IR on (1/26), HFrEF 38, pulmonary edema, superimposed PNA, failed extubation failed and prolonged vent time and s/p trach (2/13). Patient transferred to RCU (2/16) and s/p PEG (2/20). Course complicated by volume overload and diuresis and GDMT continued and HFrEF 45 with improvement    NEUROLOGY   # Encephalopathy   - Hx of CVA with no residual deficits per family, however per family worsening confusion at home over the past 6 months (becoming disoriented to time) but prior to admission has been more confused, talking about seeing people that are not present.  - CTH (1/31) with no evidence of acute infarct  - Continue on ASA and Brilinta  - Holding Neurontin, Memantine and Oxycodone in setting of somnolence    # ICA stenosis   - CTA NECK (1/31) with moderate to severe narrowing left internal carotid at the origin. Severe narrowing right internal carotid by a tandem lesion 1.5 cm above the bifurcation with a narrowed internal carotid beyond the lesion. Extensive calcified plaque both cavernous and supraclinoid carotid arteries with narrowing but no occlusion. Mild narrowing proximal right M1 segment. Moderate narrowing both vertebral V4 segments. No perfusion abnormality at the Tmax 6 second threshold, but moderate hypoperfusion in the right MCA territory at the 4 second threshold.   - Continue on ASA and Brilinta    # Myoclonic jerks   - Hx of myoclonic jerks post CVAs   - EEG (1/18-2/6) negative for seizures  - Continue on Keppra and per neuro/ psych wishes to wean, family in agreement   - Currently down titrating Keppra 500 mg BID, now Keppra 250 mg BID 3/13 - )    # Depression/ Insomnia  - Continue on Lexapro per home medication   - Course complicated by agitation and pulling on tubes   - Home Seroquel discontinued as patient now on Valproic acid syrup   - S/p Ativan 0.5mg PO Q6H PRN and Haldol 0.25 mg Q6H for severe agitation, now IM Zypexa 1.25 Q8H PRN for agitation    - Supportive care     # Agitation in setting of delirium / underlying vascular dementia   - 3/11 Psychiatry consulted, started Depakote 125 mg PO BID, increased to 250 mg PO BID 3/12  and additional 125 mg PO QD added at 1300 on 3/14   - monitor platelets, LFTs while on Depakote   - C/w VPA TID (250mg QAM, 125mg afternoon, 375mg QHS)  - Better response to Ativan than Zyprexa so will c/w Ativan 0.5mg Q6H PRN    CARDIOLOGY   # Vasoplegic vs septic shock  # Hx of HTN   - Weaned off pressors in ICU and now with mild hypertension   - Continue on coreg and hydral as below   - Strict BP control given moderate AS  - Increased vascular congestion on CXR (3/9) so will give additional Lasix 20mg PO x1 (3/10)    # AFIB RVR   # Bifascicular Block with Medtronic PPM   - AFIB RVR episodes and PPM interrogated (2/20) by EP with similar episodes  - Previous admission in 6/2022 with cardiogenic syncope second to bifasicular block, however now with PPM and AV myron blockers ok.   - Continue on lopressor 12.5mg BID however replaced with coreg second to HFrEF   - AC discussed at length however with recent GIB will hold for now     # HFrEF 38 likely stress induced?   # Mild to moderate AS   - ECHO (12/2023) with EF 62 with normal LVSF and mild to moderate AS   - ECHO (2/2024) with EF 38, moderate LVSD with global LV hypokinesis, mildly reduced RVSF (TAPSE 1.3), mild to moderate MR, mild AR, and moderate AS.   - RPT ECHO (3/4) with EF 45 and mild to moderate LVSD   - Continue on lasix 20 QD, coreg 6.25 and hydralazine 50 (increased 3/4)   - Hold isordil and up titrate above medications first     # CAD with CABG   - CATH (5/2022) with dLAD 70, SVG graft to OM1 with 90 in stent stenosis s/p PCI and GEMMA placement, and LIMA graft to mLAD with no disease.   - Continue on ASA and brilinta    # Prolonged QTC (Resolved)  - ECG (3/2) with QTC 616ms (corrected using bazzet 490ms)   - ECG (3/3) with QTC 634ms (corrected using bazzet 490ms)   - ECG (3/11) with QTC 490ms   - EKG (3/15) QTc 498ms  - Monitor off/ avoid QTC prolonging agents for now    RESPIRATORY   # Acute respiratory failure second to SARS-COV-2 vs pulm edema vs PNA  - Presented with COVID complicated by sepsis second to acute lynsey and worsening respiratory failure second to pulmonary edema requiring intubation.   - Prolonged intubation and s/p tracheostomy (2/13)   - CT CHEST 1/16 with new small bilateral pleural effusions/ atelectasis/ patchy bilateral ground-glass opacities are consistent with pulmonary edema.  - Completed ABX and COVID regimen as below   - Continue on vent and initially tolerating some SBT 12/5  - Continue on nebs and hold further HTS given intermittent hemoptysis  - Continue on diuresis as above    # Mild hemoptysis likely from suction trauma   - s/p bronch (2/18) with no active bleed  - Hemoptysis improved with TXA and holding further HTS as above   - Tiny hemoptysis second to suction trauma imporving  - Careful with suctioning   - Recurrent hemoptysis (3/9) so ordered for TXA nebs again however hemoptysis appeared self limited and stopped before nebs even given    HEENT   # L eye subconjunctival hemorrhage   - Continue on artifical tears and Lacrilube   - Optho eval appreciated     GI  # Nutrition/ Dysphagia   - PEG placed by GI on 2/20 and tube feeds continued.   - Loose stools and Banatrol continued     # UGIB   - EGD (1/2) with esophagitis and bleeding Dieulafoy's lesion s/p clips.   - Continue on PPI and carafate     # SMA calcification   - CTA demonstrating mesenteric fat stranding associated with ascending/transverse colon  - Diagnostic laparoscopy (12/24) with small bowel and visible colon viable, some inflammation of omentum in RUQ  - SMA Angiogram and stent placed (12/25).   - Continue on ASA and Brilinta     # Acute Cholecystitis   - CT (12/26) with distended GB with cholelithiasis and sludge   - HIDA (12/29) POSITIVE for acute cholecystitis   - Case discussed with IR at length and s/p PERC LYNSEY (1/26)   - Completed zosyn course (12/24-12/31)   - PERC LYNSEY tube to stay in until surgical candidate for cholecystectomy to prevent recurrence     / RENAL   # CKD   - CRE at baseline   - Monitor renal function and UOP   - Aranesp SQ x1 (3/8)    # Hypernatremia (resolved)   - s/p  Q4H  - Monitor closely with diuresis as above   - Stop FWF (3/15) given c/f worsening vol overload       INFECTIOUS DISEASE   # COVID with superimposed PNA   # ESBL Kleb PNA   - COVID POSITIVE (12/23) and completed remdesivir x 1 dose and paxlovid course.   - WOB worsened as above requiring intubation and cultures negative. Zosyn completed empirically however hypothermic (2/11) and BCx, UA, RVP and BAL negative.   - Completed additional zosyn (2/12-2/19) empirically   - Fever spike and thick secretions and SCX (2/26) with ESBL klebs.   - s/p Zosyn (2/27 - 2/29) but resistant and completed Erta (2/29 - 3/6)     HEME  # AOCD   - Monitor HH   - DVT PPX with HSQ     ENDOCRINE   # DM2 A1C 9.3   - Continue on Lantus 14 and ISS     SKIN/ TUBES   - Trach (2/13)   - PEG (2/20)   - PERC LYNSEY (1/26 - )     ETHICS   - FULL CODE     DISPO - vent facility and family aware. SW with accepting facility however pending available bed. D

## 2024-03-15 NOTE — CHART NOTE - NSCHARTNOTEFT_GEN_A_CORE
[facility] NIGHT MEDICINE COVERAGE    Notified by Respiratory Therapist that pt is pulling high peak pressures >45 on AC mode on ventilator.  Pt seen at bedside, appears comfortable, resting, satting well on ventilator.  High peak and plateau pressures noted on review of ventilator w/ RT.  Pt switched to PRVC mode with the following settings: Rate = 16, TV = 400, PEEP = 5, and FiO2 = 30%.  Peak pressures noted to be consistently <40 on PRVC mode with previously mentioned setting.  Contacted MICU regarding ventilator setting adjustment recommendations, recommended assessing ABG within an hour of setting changes to assess response.  ABG ordered, will draw, send, and f/u result to assess response.    Vital Signs Last 24 Hrs  T(C): 37.2 (15 Mar 2024 17:27), Max: 37.2 (15 Mar 2024 13:18)  T(F): 98.9 (15 Mar 2024 17:27), Max: 98.9 (15 Mar 2024 13:18)  HR: 87 (15 Mar 2024 17:27) (80 - 87)  BP: 127/89 (15 Mar 2024 17:27) (108/51 - 127/89)  RR: 25 (15 Mar 2024 17:27) (15 - 25)  SpO2: 94% (15 Mar 2024 17:27) (94% - 100%)  O2 Parameters below as of 15 Mar 2024 17:27  Patient On (Oxygen Delivery Method): ventilator  O2 Concentration (%): 40    Rangel Natarajan PA-C  Department of Hospital Medicine - Night [facility]  In-House Pager: #[phone] [facility] NIGHT MEDICINE COVERAGE    Notified by Respiratory Therapist that pt is pulling high peak pressures >45 on AC mode on ventilator.  Pt seen at bedside, appears comfortable, resting, satting well on ventilator.  High peak and plateau pressures noted on review of ventilator w/ RT.  Pt switched to PRVC mode with the following settings: Rate = 16, TV = 400, PEEP = 5, and FiO2 = 30%.  Peak pressures noted to be consistently <40 on PRVC mode with previously mentioned setting.  Contacted MICU regarding ventilator setting adjustment recommendations, recommended assessing ABG within an hour of setting changes to assess response.  ABG ordered, will draw, send, and f/u result to assess response.    Vital Signs Last 24 Hrs  T(C): 37.2 (15 Mar 2024 17:27), Max: 37.2 (15 Mar 2024 13:18)  T(F): 98.9 (15 Mar 2024 17:27), Max: 98.9 (15 Mar 2024 13:18)  HR: 87 (15 Mar 2024 17:27) (80 - 87)  BP: 127/89 (15 Mar 2024 17:27) (108/51 - 127/89)  RR: 25 (15 Mar 2024 17:27) (15 - 25)  SpO2: 94% (15 Mar 2024 17:27) (94% - 100%)  O2 Parameters below as of 15 Mar 2024 17:27  Patient On (Oxygen Delivery Method): ventilator  O2 Concentration (%): 40    ADDENDUM:  ABG results noted below:    ABG - ( 15 Mar 2024 21:56 )  pH, Arterial: 7.44  pH, Blood: x     /  pCO2: 50    /  pO2: 130   / HCO3: 34    / Base Excess: 8.8   /  SaO2: 98.9    <End of referenced text>    No significant change from prior ABG from 3/7/2024.  Will continue w/ PRVC mode for tonight, reassess VBG in AM per recs from MICU.  D/w pt's grandsons at bedside, all questions answered.  Will continue to monitor.    Rangel Natarajan PA-C  Department of Hospital Medicine - Night [facility]  In-House Pager: #[phone]

## 2024-03-15 NOTE — PROGRESS NOTE ADULT - SUBJECTIVE AND OBJECTIVE BOX
CHIEF COMPLAINT:Patient is a 89y old  Male who presents with a chief complaint of COVID19, Chest pain (14 Mar 2024 07:34)      INTERVAL EVENTS:     ROS: Seen by bedside during AM rounds     OBJECTIVE:  ICU Vital Signs Last 24 Hrs  T(C): 36.7 (15 Mar 2024 04:52), Max: 36.7 (14 Mar 2024 13:08)  T(F): 98 (15 Mar 2024 04:52), Max: 98.1 (14 Mar 2024 13:08)  HR: 83 (15 Mar 2024 06:29) (78 - 87)  BP: 122/51 (15 Mar 2024 04:52) (93/38 - 122/51)  BP(mean): --  ABP: --  ABP(mean): --  RR: 19 (15 Mar 2024 04:52) (19 - 20)  SpO2: 99% (15 Mar 2024 06:29) (92% - 100%)    O2 Parameters below as of 15 Mar 2024 06:29  Patient On (Oxygen Delivery Method): ventilator          Mode: AC/ CMV (Assist Control/ Continuous Mandatory Ventilation), RR (machine): 12, TV (machine): 450, FiO2: 40, PEEP: 5, ITime: 0.9, MAP: 15, PIP: 50    03-13 @ 07:01 - 03-14 @ 07:00  --------------------------------------------------------  IN: 1650 mL / OUT: 1722 mL / NET: -72 mL    03-14 @ 07:01 - 03-15 @ 06:54  --------------------------------------------------------  IN: 1210 mL / OUT: 12 mL / NET: 1198 mL      CAPILLARY BLOOD GLUCOSE      POCT Blood Glucose.: 177 mg/dL (15 Mar 2024 05:10)      PHYSICAL EXAM:  General:   HEENT:   Lymph Nodes:  Neck:   Respiratory:   Cardiovascular:   Abdomen:   Extremities:   Skin:   Neurological:  Psychiatry:    Mode: AC/ CMV (Assist Control/ Continuous Mandatory Ventilation)  RR (machine): 12  TV (machine): 450  FiO2: 40  PEEP: 5  ITime: 0.9  MAP: 15  PIP: 50      HOSPITAL MEDICATIONS:  MEDICATIONS  (STANDING):  albuterol/ipratropium for Nebulization 3 milliLiter(s) Nebulizer every 12 hours  artificial  tears Solution 1 Drop(s) Left EYE four times a day  aspirin  chewable 81 milliGRAM(s) Enteral Tube daily  carvedilol 6.25 milliGRAM(s) Oral every 12 hours  chlorhexidine 0.12% Liquid 15 milliLiter(s) Oral Mucosa every 12 hours  chlorhexidine 2% Cloths 1 Application(s) Topical daily  dextrose 5%. 1000 milliLiter(s) (100 mL/Hr) IV Continuous <Continuous>  dextrose 5%. 1000 milliLiter(s) (50 mL/Hr) IV Continuous <Continuous>  dextrose 50% Injectable 25 Gram(s) IV Push once  dextrose 50% Injectable 25 Gram(s) IV Push once  dextrose 50% Injectable 12.5 Gram(s) IV Push once  doxazosin 2 milliGRAM(s) Oral at bedtime  escitalopram Solution 10 milliGRAM(s) Oral daily  furosemide    Tablet 20 milliGRAM(s) Oral daily  glucagon  Injectable 1 milliGRAM(s) IntraMuscular once  heparin   Injectable 5000 Unit(s) SubCutaneous every 8 hours  hydrALAZINE 25 milliGRAM(s) Oral every 8 hours  insulin glargine Injectable (LANTUS) 14 Unit(s) SubCutaneous <User Schedule>  insulin lispro (ADMELOG) corrective regimen sliding scale   SubCutaneous every 6 hours  levETIRAcetam  Solution 250 milliGRAM(s) Oral two times a day  melatonin 6 milliGRAM(s) Oral at bedtime  pantoprazole   Suspension 40 milliGRAM(s) Oral every 12 hours  petrolatum Ophthalmic Ointment 1 Application(s) Left EYE at bedtime  polyethylene glycol 3350 17 Gram(s) Oral daily  senna Syrup 10 milliLiter(s) Oral at bedtime  sucralfate suspension 1 Gram(s) Enteral Tube two times a day  ticagrelor 60 milliGRAM(s) Oral every 12 hours  valproic  acid Syrup 250 milliGRAM(s) Oral two times a day  valproic  acid Syrup 125 milliGRAM(s) Oral <User Schedule>    MEDICATIONS  (PRN):  acetaminophen   Oral Liquid .. 650 milliGRAM(s) Oral every 6 hours PRN Temp greater or equal to 38C (100.4F), Mild Pain (1 - 3), Moderate Pain (4 - 6)  dextrose Oral Gel 15 Gram(s) Oral once PRN Blood Glucose LESS THAN 70 milliGRAM(s)/deciliter  OLANZapine Injectable 1.25 milliGRAM(s) IntraMuscular every 8 hours PRN for severe agitation      LABS:                        8.3    7.49  )-----------( 293      ( 14 Mar 2024 05:20 )             26.0     03-14    138  |  97<L>  |  49<H>  ----------------------------<  154<H>  4.3   |  30  |  1.45<H>    Ca    8.4      14 Mar 2024 05:20  Phos  3.4     03-14  Mg     2.40     03-14        Urinalysis Basic - ( 14 Mar 2024 05:20 )    Color: x / Appearance: x / SG: x / pH: x  Gluc: 154 mg/dL / Ketone: x  / Bili: x / Urobili: x   Blood: x / Protein: x / Nitrite: x   Leuk Esterase: x / RBC: x / WBC x   Sq Epi: x / Non Sq Epi: x / Bacteria: x         CHIEF COMPLAINT:Patient is a 89y old  Male who presents with a chief complaint of COVID19, Chest pain (14 Mar 2024 07:34)      INTERVAL EVENTS:     ROS: Seen by bedside during AM rounds     OBJECTIVE:  ICU Vital Signs Last 24 Hrs  T(C): 36.7 (15 Mar 2024 04:52), Max: 36.7 (14 Mar 2024 13:08)  T(F): 98 (15 Mar 2024 04:52), Max: 98.1 (14 Mar 2024 13:08)  HR: 83 (15 Mar 2024 06:29) (78 - 87)  BP: 122/51 (15 Mar 2024 04:52) (93/38 - 122/51)  BP(mean): --  ABP: --  ABP(mean): --  RR: 19 (15 Mar 2024 04:52) (19 - 20)  SpO2: 99% (15 Mar 2024 06:29) (92% - 100%)    O2 Parameters below as of 15 Mar 2024 06:29  Patient On (Oxygen Delivery Method): ventilator          Mode: AC/ CMV (Assist Control/ Continuous Mandatory Ventilation), RR (machine): 12, TV (machine): 450, FiO2: 40, PEEP: 5, ITime: 0.9, MAP: 15, PIP: 50    03-13 @ 07:01  -  03-14 @ 07:00  --------------------------------------------------------  IN: 1650 mL / OUT: 1722 mL / NET: -72 mL    03-14 @ 07:01  -  03-15 @ 06:54  --------------------------------------------------------  IN: 1210 mL / OUT: 12 mL / NET: 1198 mL      CAPILLARY BLOOD GLUCOSE      POCT Blood Glucose.: 177 mg/dL (15 Mar 2024 05:10)      PHYSICAL EXAM:  General: NAD   Neck: (+) Trach tube noted, site c/d/i.  Cards: S1/S2, no murmurs   Pulm: Some mild expiratory wheezing b/l   Abdomen: Soft, NTND. (+) PEG noted, site c/d/i. (+)Biliary drain in anterior abdomen, draining bilious fluid.  Extremities: 1-2+ b/l LE pitting edema. Extremities warm to touch. Intact distal pulses....  Neurology: Awake/alert. Appearing somewhat delirious, raising hands into air appearing to grab at things. Nonverbal in s/o trach/vent.   Skin: warm to touch, color appropriate for ethnicity. Refer to RN assessment for further details.      Mode: AC/ CMV (Assist Control/ Continuous Mandatory Ventilation)  RR (machine): 12  TV (machine): 450  FiO2: 40  PEEP: 5  ITime: 0.9  MAP: 15  PIP: 50      HOSPITAL MEDICATIONS:  MEDICATIONS  (STANDING):  albuterol/ipratropium for Nebulization 3 milliLiter(s) Nebulizer every 12 hours  artificial  tears Solution 1 Drop(s) Left EYE four times a day  aspirin  chewable 81 milliGRAM(s) Enteral Tube daily  carvedilol 6.25 milliGRAM(s) Oral every 12 hours  chlorhexidine 0.12% Liquid 15 milliLiter(s) Oral Mucosa every 12 hours  chlorhexidine 2% Cloths 1 Application(s) Topical daily  dextrose 5%. 1000 milliLiter(s) (100 mL/Hr) IV Continuous <Continuous>  dextrose 5%. 1000 milliLiter(s) (50 mL/Hr) IV Continuous <Continuous>  dextrose 50% Injectable 25 Gram(s) IV Push once  dextrose 50% Injectable 25 Gram(s) IV Push once  dextrose 50% Injectable 12.5 Gram(s) IV Push once  doxazosin 2 milliGRAM(s) Oral at bedtime  escitalopram Solution 10 milliGRAM(s) Oral daily  furosemide    Tablet 20 milliGRAM(s) Oral daily  glucagon  Injectable 1 milliGRAM(s) IntraMuscular once  heparin   Injectable 5000 Unit(s) SubCutaneous every 8 hours  hydrALAZINE 25 milliGRAM(s) Oral every 8 hours  insulin glargine Injectable (LANTUS) 14 Unit(s) SubCutaneous <User Schedule>  insulin lispro (ADMELOG) corrective regimen sliding scale   SubCutaneous every 6 hours  levETIRAcetam  Solution 250 milliGRAM(s) Oral two times a day  melatonin 6 milliGRAM(s) Oral at bedtime  pantoprazole   Suspension 40 milliGRAM(s) Oral every 12 hours  petrolatum Ophthalmic Ointment 1 Application(s) Left EYE at bedtime  polyethylene glycol 3350 17 Gram(s) Oral daily  senna Syrup 10 milliLiter(s) Oral at bedtime  sucralfate suspension 1 Gram(s) Enteral Tube two times a day  ticagrelor 60 milliGRAM(s) Oral every 12 hours  valproic  acid Syrup 250 milliGRAM(s) Oral two times a day  valproic  acid Syrup 125 milliGRAM(s) Oral <User Schedule>    MEDICATIONS  (PRN):  acetaminophen   Oral Liquid .. 650 milliGRAM(s) Oral every 6 hours PRN Temp greater or equal to 38C (100.4F), Mild Pain (1 - 3), Moderate Pain (4 - 6)  dextrose Oral Gel 15 Gram(s) Oral once PRN Blood Glucose LESS THAN 70 milliGRAM(s)/deciliter  OLANZapine Injectable 1.25 milliGRAM(s) IntraMuscular every 8 hours PRN for severe agitation      LABS:                        8.3    7.49  )-----------( 293      ( 14 Mar 2024 05:20 )             26.0     03-14    138  |  97<L>  |  49<H>  ----------------------------<  154<H>  4.3   |  30  |  1.45<H>    Ca    8.4      14 Mar 2024 05:20  Phos  3.4     03-14  Mg     2.40     03-14        Urinalysis Basic - ( 14 Mar 2024 05:20 )    Color: x / Appearance: x / SG: x / pH: x  Gluc: 154 mg/dL / Ketone: x  / Bili: x / Urobili: x   Blood: x / Protein: x / Nitrite: x   Leuk Esterase: x / RBC: x / WBC x   Sq Epi: x / Non Sq Epi: x / Bacteria: x         CHIEF COMPLAINT:Patient is a 89y old  Male who presents with a chief complaint of COVID19, Chest pain (14 Mar 2024 07:34)      INTERVAL EVENTS: no acute overnight events noted. This morning noted with high pPeaks now improved.     ROS: Seen by bedside during AM rounds     OBJECTIVE:  ICU Vital Signs Last 24 Hrs  T(C): 36.7 (15 Mar 2024 04:52), Max: 36.7 (14 Mar 2024 13:08)  T(F): 98 (15 Mar 2024 04:52), Max: 98.1 (14 Mar 2024 13:08)  HR: 83 (15 Mar 2024 06:29) (78 - 87)  BP: 122/51 (15 Mar 2024 04:52) (93/38 - 122/51)  BP(mean): --  ABP: --  ABP(mean): --  RR: 19 (15 Mar 2024 04:52) (19 - 20)  SpO2: 99% (15 Mar 2024 06:29) (92% - 100%)    O2 Parameters below as of 15 Mar 2024 06:29  Patient On (Oxygen Delivery Method): ventilator          Mode: AC/ CMV (Assist Control/ Continuous Mandatory Ventilation), RR (machine): 12, TV (machine): 450, FiO2: 40, PEEP: 5, ITime: 0.9, MAP: 15, PIP: 50    03-13 @ 07:01 - 03-14 @ 07:00  --------------------------------------------------------  IN: 1650 mL / OUT: 1722 mL / NET: -72 mL    03-14 @ 07:01  -  03-15 @ 06:54  --------------------------------------------------------  IN: 1210 mL / OUT: 12 mL / NET: 1198 mL      CAPILLARY BLOOD GLUCOSE      POCT Blood Glucose.: 177 mg/dL (15 Mar 2024 05:10)      PHYSICAL EXAM:  General: NAD   Neck: (+) Trach tube noted, site c/d/i.  Cards: S1/S2, no murmurs   Pulm: Some mild expiratory wheezing b/l   Abdomen: Soft, NTND. (+) PEG noted, site c/d/i. (+)Biliary drain in anterior abdomen, draining bilious fluid.  Extremities: 1-2+ b/l LE pitting edema. Extremities warm to touch.   Neurology: Awake/alert. Appearing somewhat delirious, raising hands into air appearing to grab at things. Nonverbal in s/o trach/vent.   Skin: warm to touch, color appropriate for ethnicity. Refer to RN assessment for further details.      Mode: AC/ CMV (Assist Control/ Continuous Mandatory Ventilation)  RR (machine): 12  TV (machine): 450  FiO2: 40  PEEP: 5  ITime: 0.9  MAP: 15  PIP: 50      HOSPITAL MEDICATIONS:  MEDICATIONS  (STANDING):  albuterol/ipratropium for Nebulization 3 milliLiter(s) Nebulizer every 12 hours  artificial  tears Solution 1 Drop(s) Left EYE four times a day  aspirin  chewable 81 milliGRAM(s) Enteral Tube daily  carvedilol 6.25 milliGRAM(s) Oral every 12 hours  chlorhexidine 0.12% Liquid 15 milliLiter(s) Oral Mucosa every 12 hours  chlorhexidine 2% Cloths 1 Application(s) Topical daily  dextrose 5%. 1000 milliLiter(s) (100 mL/Hr) IV Continuous <Continuous>  dextrose 5%. 1000 milliLiter(s) (50 mL/Hr) IV Continuous <Continuous>  dextrose 50% Injectable 25 Gram(s) IV Push once  dextrose 50% Injectable 25 Gram(s) IV Push once  dextrose 50% Injectable 12.5 Gram(s) IV Push once  doxazosin 2 milliGRAM(s) Oral at bedtime  escitalopram Solution 10 milliGRAM(s) Oral daily  furosemide    Tablet 20 milliGRAM(s) Oral daily  glucagon  Injectable 1 milliGRAM(s) IntraMuscular once  heparin   Injectable 5000 Unit(s) SubCutaneous every 8 hours  hydrALAZINE 25 milliGRAM(s) Oral every 8 hours  insulin glargine Injectable (LANTUS) 14 Unit(s) SubCutaneous <User Schedule>  insulin lispro (ADMELOG) corrective regimen sliding scale   SubCutaneous every 6 hours  levETIRAcetam  Solution 250 milliGRAM(s) Oral two times a day  melatonin 6 milliGRAM(s) Oral at bedtime  pantoprazole   Suspension 40 milliGRAM(s) Oral every 12 hours  petrolatum Ophthalmic Ointment 1 Application(s) Left EYE at bedtime  polyethylene glycol 3350 17 Gram(s) Oral daily  senna Syrup 10 milliLiter(s) Oral at bedtime  sucralfate suspension 1 Gram(s) Enteral Tube two times a day  ticagrelor 60 milliGRAM(s) Oral every 12 hours  valproic  acid Syrup 250 milliGRAM(s) Oral two times a day  valproic  acid Syrup 125 milliGRAM(s) Oral <User Schedule>    MEDICATIONS  (PRN):  acetaminophen   Oral Liquid .. 650 milliGRAM(s) Oral every 6 hours PRN Temp greater or equal to 38C (100.4F), Mild Pain (1 - 3), Moderate Pain (4 - 6)  dextrose Oral Gel 15 Gram(s) Oral once PRN Blood Glucose LESS THAN 70 milliGRAM(s)/deciliter  OLANZapine Injectable 1.25 milliGRAM(s) IntraMuscular every 8 hours PRN for severe agitation      LABS:                        8.3    7.49  )-----------( 293      ( 14 Mar 2024 05:20 )             26.0     03-14    138  |  97<L>  |  49<H>  ----------------------------<  154<H>  4.3   |  30  |  1.45<H>    Ca    8.4      14 Mar 2024 05:20  Phos  3.4     03-14  Mg     2.40     03-14        Urinalysis Basic - ( 14 Mar 2024 05:20 )    Color: x / Appearance: x / SG: x / pH: x  Gluc: 154 mg/dL / Ketone: x  / Bili: x / Urobili: x   Blood: x / Protein: x / Nitrite: x   Leuk Esterase: x / RBC: x / WBC x   Sq Epi: x / Non Sq Epi: x / Bacteria: x

## 2024-03-16 LAB
ADD ON TEST-SPECIMEN IN LAB: SIGNIFICANT CHANGE UP
ADD ON TEST-SPECIMEN IN LAB: SIGNIFICANT CHANGE UP
ALBUMIN SERPL ELPH-MCNC: 2.8 G/DL — LOW (ref 3.3–5)
ALBUMIN SERPL ELPH-MCNC: 3.1 G/DL — LOW (ref 3.3–5)
ALP SERPL-CCNC: 134 U/L — HIGH (ref 40–120)
ALP SERPL-CCNC: 145 U/L — HIGH (ref 40–120)
ALT FLD-CCNC: 15 U/L — SIGNIFICANT CHANGE UP (ref 4–41)
ALT FLD-CCNC: 16 U/L — SIGNIFICANT CHANGE UP (ref 4–41)
AMMONIA BLD-MCNC: 43 UMOL/L — SIGNIFICANT CHANGE UP (ref 11–55)
ANION GAP SERPL CALC-SCNC: 11 MMOL/L — SIGNIFICANT CHANGE UP (ref 7–14)
ANION GAP SERPL CALC-SCNC: 13 MMOL/L — SIGNIFICANT CHANGE UP (ref 7–14)
APTT BLD: 30.4 SEC — SIGNIFICANT CHANGE UP (ref 24.5–35.6)
AST SERPL-CCNC: 17 U/L — SIGNIFICANT CHANGE UP (ref 4–40)
AST SERPL-CCNC: 18 U/L — SIGNIFICANT CHANGE UP (ref 4–40)
BASOPHILS # BLD AUTO: 0.04 K/UL — SIGNIFICANT CHANGE UP (ref 0–0.2)
BASOPHILS NFR BLD AUTO: 0.3 % — SIGNIFICANT CHANGE UP (ref 0–2)
BILIRUB SERPL-MCNC: 0.2 MG/DL — SIGNIFICANT CHANGE UP (ref 0.2–1.2)
BILIRUB SERPL-MCNC: 0.3 MG/DL — SIGNIFICANT CHANGE UP (ref 0.2–1.2)
BUN SERPL-MCNC: 42 MG/DL — HIGH (ref 7–23)
BUN SERPL-MCNC: 44 MG/DL — HIGH (ref 7–23)
CALCIUM SERPL-MCNC: 8.8 MG/DL — SIGNIFICANT CHANGE UP (ref 8.4–10.5)
CALCIUM SERPL-MCNC: 8.8 MG/DL — SIGNIFICANT CHANGE UP (ref 8.4–10.5)
CHLORIDE SERPL-SCNC: 102 MMOL/L — SIGNIFICANT CHANGE UP (ref 98–107)
CHLORIDE SERPL-SCNC: 98 MMOL/L — SIGNIFICANT CHANGE UP (ref 98–107)
CO2 SERPL-SCNC: 27 MMOL/L — SIGNIFICANT CHANGE UP (ref 22–31)
CO2 SERPL-SCNC: 32 MMOL/L — HIGH (ref 22–31)
CREAT SERPL-MCNC: 1.21 MG/DL — SIGNIFICANT CHANGE UP (ref 0.5–1.3)
CREAT SERPL-MCNC: 1.32 MG/DL — HIGH (ref 0.5–1.3)
EGFR: 51 ML/MIN/1.73M2 — LOW
EGFR: 57 ML/MIN/1.73M2 — LOW
EOSINOPHIL # BLD AUTO: 0.64 K/UL — HIGH (ref 0–0.5)
EOSINOPHIL NFR BLD AUTO: 4.9 % — SIGNIFICANT CHANGE UP (ref 0–6)
GAS PNL BLDV: SIGNIFICANT CHANGE UP
GLUCOSE BLDC GLUCOMTR-MCNC: 142 MG/DL — HIGH (ref 70–99)
GLUCOSE BLDC GLUCOMTR-MCNC: 153 MG/DL — HIGH (ref 70–99)
GLUCOSE BLDC GLUCOMTR-MCNC: 156 MG/DL — HIGH (ref 70–99)
GLUCOSE BLDC GLUCOMTR-MCNC: 161 MG/DL — HIGH (ref 70–99)
GLUCOSE SERPL-MCNC: 125 MG/DL — HIGH (ref 70–99)
GLUCOSE SERPL-MCNC: 150 MG/DL — HIGH (ref 70–99)
HCT VFR BLD CALC: 26.7 % — LOW (ref 39–50)
HCT VFR BLD CALC: 29.2 % — LOW (ref 39–50)
HGB BLD-MCNC: 8.6 G/DL — LOW (ref 13–17)
HGB BLD-MCNC: 9.2 G/DL — LOW (ref 13–17)
IANC: 8.86 K/UL — HIGH (ref 1.8–7.4)
IMM GRANULOCYTES NFR BLD AUTO: 0.4 % — SIGNIFICANT CHANGE UP (ref 0–0.9)
INR BLD: 1.06 RATIO — SIGNIFICANT CHANGE UP (ref 0.85–1.18)
LYMPHOCYTES # BLD AUTO: 2.64 K/UL — SIGNIFICANT CHANGE UP (ref 1–3.3)
LYMPHOCYTES # BLD AUTO: 20 % — SIGNIFICANT CHANGE UP (ref 13–44)
MAGNESIUM SERPL-MCNC: 2.3 MG/DL — SIGNIFICANT CHANGE UP (ref 1.6–2.6)
MAGNESIUM SERPL-MCNC: 2.3 MG/DL — SIGNIFICANT CHANGE UP (ref 1.6–2.6)
MCHC RBC-ENTMCNC: 29.5 PG — SIGNIFICANT CHANGE UP (ref 27–34)
MCHC RBC-ENTMCNC: 29.8 PG — SIGNIFICANT CHANGE UP (ref 27–34)
MCHC RBC-ENTMCNC: 31.5 GM/DL — LOW (ref 32–36)
MCHC RBC-ENTMCNC: 32.2 GM/DL — SIGNIFICANT CHANGE UP (ref 32–36)
MCV RBC AUTO: 92.4 FL — SIGNIFICANT CHANGE UP (ref 80–100)
MCV RBC AUTO: 93.6 FL — SIGNIFICANT CHANGE UP (ref 80–100)
MONOCYTES # BLD AUTO: 0.94 K/UL — HIGH (ref 0–0.9)
MONOCYTES NFR BLD AUTO: 7.1 % — SIGNIFICANT CHANGE UP (ref 2–14)
NEUTROPHILS # BLD AUTO: 8.86 K/UL — HIGH (ref 1.8–7.4)
NEUTROPHILS NFR BLD AUTO: 67.3 % — SIGNIFICANT CHANGE UP (ref 43–77)
NRBC # BLD: 0 /100 WBCS — SIGNIFICANT CHANGE UP (ref 0–0)
NRBC # BLD: 0 /100 WBCS — SIGNIFICANT CHANGE UP (ref 0–0)
NRBC # FLD: 0 K/UL — SIGNIFICANT CHANGE UP (ref 0–0)
NRBC # FLD: 0 K/UL — SIGNIFICANT CHANGE UP (ref 0–0)
PHOSPHATE SERPL-MCNC: 2.8 MG/DL — SIGNIFICANT CHANGE UP (ref 2.5–4.5)
PHOSPHATE SERPL-MCNC: 3 MG/DL — SIGNIFICANT CHANGE UP (ref 2.5–4.5)
PLATELET # BLD AUTO: 281 K/UL — SIGNIFICANT CHANGE UP (ref 150–400)
PLATELET # BLD AUTO: 317 K/UL — SIGNIFICANT CHANGE UP (ref 150–400)
POTASSIUM SERPL-MCNC: 4.3 MMOL/L — SIGNIFICANT CHANGE UP (ref 3.5–5.3)
POTASSIUM SERPL-MCNC: 4.5 MMOL/L — SIGNIFICANT CHANGE UP (ref 3.5–5.3)
POTASSIUM SERPL-SCNC: 4.3 MMOL/L — SIGNIFICANT CHANGE UP (ref 3.5–5.3)
POTASSIUM SERPL-SCNC: 4.5 MMOL/L — SIGNIFICANT CHANGE UP (ref 3.5–5.3)
PROT SERPL-MCNC: 6.9 G/DL — SIGNIFICANT CHANGE UP (ref 6–8.3)
PROT SERPL-MCNC: 7.3 G/DL — SIGNIFICANT CHANGE UP (ref 6–8.3)
PROTHROM AB SERPL-ACNC: 11.9 SEC — SIGNIFICANT CHANGE UP (ref 9.5–13)
RBC # BLD: 2.89 M/UL — LOW (ref 4.2–5.8)
RBC # BLD: 3.12 M/UL — LOW (ref 4.2–5.8)
RBC # FLD: 16 % — HIGH (ref 10.3–14.5)
RBC # FLD: 16.1 % — HIGH (ref 10.3–14.5)
SODIUM SERPL-SCNC: 138 MMOL/L — SIGNIFICANT CHANGE UP (ref 135–145)
SODIUM SERPL-SCNC: 145 MMOL/L — SIGNIFICANT CHANGE UP (ref 135–145)
T4 FREE SERPL-MCNC: 1.1 NG/DL — SIGNIFICANT CHANGE UP (ref 0.9–1.8)
TSH SERPL-MCNC: 1.91 UIU/ML — SIGNIFICANT CHANGE UP (ref 0.27–4.2)
VALPROATE SERPL-MCNC: 38.1 UG/ML — LOW (ref 50–100)
WBC # BLD: 13.17 K/UL — HIGH (ref 3.8–10.5)
WBC # BLD: 8.62 K/UL — SIGNIFICANT CHANGE UP (ref 3.8–10.5)
WBC # FLD AUTO: 13.17 K/UL — HIGH (ref 3.8–10.5)
WBC # FLD AUTO: 8.62 K/UL — SIGNIFICANT CHANGE UP (ref 3.8–10.5)

## 2024-03-16 PROCEDURE — 70450 CT HEAD/BRAIN W/O DYE: CPT | Mod: 26

## 2024-03-16 PROCEDURE — 99233 SBSQ HOSP IP/OBS HIGH 50: CPT | Mod: FS

## 2024-03-16 RX ORDER — CHLORHEXIDINE GLUCONATE 213 G/1000ML
15 SOLUTION TOPICAL EVERY 12 HOURS
Refills: 0 | Status: DISCONTINUED | OUTPATIENT
Start: 2024-03-16 | End: 2024-03-24

## 2024-03-16 RX ORDER — LACTULOSE 10 G/15ML
20 SOLUTION ORAL ONCE
Refills: 0 | Status: COMPLETED | OUTPATIENT
Start: 2024-03-16 | End: 2024-03-16

## 2024-03-16 RX ADMIN — Medication 125 MILLIGRAM(S): at 13:13

## 2024-03-16 RX ADMIN — CHLORHEXIDINE GLUCONATE 1 APPLICATION(S): 213 SOLUTION TOPICAL at 11:07

## 2024-03-16 RX ADMIN — Medication 25 MILLIGRAM(S): at 13:13

## 2024-03-16 RX ADMIN — CHLORHEXIDINE GLUCONATE 15 MILLILITER(S): 213 SOLUTION TOPICAL at 05:42

## 2024-03-16 RX ADMIN — Medication 0.5 MILLIGRAM(S): at 02:27

## 2024-03-16 RX ADMIN — LACTULOSE 20 GRAM(S): 10 SOLUTION ORAL at 18:31

## 2024-03-16 RX ADMIN — Medication 3 MILLILITER(S): at 07:21

## 2024-03-16 RX ADMIN — POLYETHYLENE GLYCOL 3350 17 GRAM(S): 17 POWDER, FOR SOLUTION ORAL at 11:07

## 2024-03-16 RX ADMIN — INSULIN GLARGINE 14 UNIT(S): 100 INJECTION, SOLUTION SUBCUTANEOUS at 11:06

## 2024-03-16 RX ADMIN — Medication 81 MILLIGRAM(S): at 11:07

## 2024-03-16 RX ADMIN — LEVETIRACETAM 250 MILLIGRAM(S): 250 TABLET, FILM COATED ORAL at 17:16

## 2024-03-16 RX ADMIN — HEPARIN SODIUM 5000 UNIT(S): 5000 INJECTION INTRAVENOUS; SUBCUTANEOUS at 05:39

## 2024-03-16 RX ADMIN — Medication 2 MILLIGRAM(S): at 21:24

## 2024-03-16 RX ADMIN — PANTOPRAZOLE SODIUM 40 MILLIGRAM(S): 20 TABLET, DELAYED RELEASE ORAL at 17:17

## 2024-03-16 RX ADMIN — Medication 20 MILLIGRAM(S): at 05:40

## 2024-03-16 RX ADMIN — TICAGRELOR 60 MILLIGRAM(S): 90 TABLET ORAL at 05:39

## 2024-03-16 RX ADMIN — Medication 1 GRAM(S): at 05:41

## 2024-03-16 RX ADMIN — Medication 25 MILLIGRAM(S): at 21:25

## 2024-03-16 RX ADMIN — Medication 1 GRAM(S): at 17:17

## 2024-03-16 RX ADMIN — Medication 2: at 23:17

## 2024-03-16 RX ADMIN — PANTOPRAZOLE SODIUM 40 MILLIGRAM(S): 20 TABLET, DELAYED RELEASE ORAL at 05:39

## 2024-03-16 RX ADMIN — Medication 1 DROP(S): at 23:17

## 2024-03-16 RX ADMIN — Medication 1 DROP(S): at 17:19

## 2024-03-16 RX ADMIN — Medication 1 DROP(S): at 05:38

## 2024-03-16 RX ADMIN — Medication 2: at 11:06

## 2024-03-16 RX ADMIN — Medication 3 MILLILITER(S): at 20:45

## 2024-03-16 RX ADMIN — HEPARIN SODIUM 5000 UNIT(S): 5000 INJECTION INTRAVENOUS; SUBCUTANEOUS at 13:13

## 2024-03-16 RX ADMIN — ESCITALOPRAM OXALATE 10 MILLIGRAM(S): 10 TABLET, FILM COATED ORAL at 11:07

## 2024-03-16 RX ADMIN — Medication 1 APPLICATION(S): at 21:25

## 2024-03-16 RX ADMIN — CARVEDILOL PHOSPHATE 6.25 MILLIGRAM(S): 80 CAPSULE, EXTENDED RELEASE ORAL at 17:17

## 2024-03-16 RX ADMIN — TICAGRELOR 60 MILLIGRAM(S): 90 TABLET ORAL at 17:17

## 2024-03-16 RX ADMIN — CHLORHEXIDINE GLUCONATE 15 MILLILITER(S): 213 SOLUTION TOPICAL at 17:16

## 2024-03-16 RX ADMIN — CARVEDILOL PHOSPHATE 6.25 MILLIGRAM(S): 80 CAPSULE, EXTENDED RELEASE ORAL at 05:41

## 2024-03-16 RX ADMIN — Medication 25 MILLIGRAM(S): at 05:40

## 2024-03-16 RX ADMIN — LEVETIRACETAM 250 MILLIGRAM(S): 250 TABLET, FILM COATED ORAL at 05:39

## 2024-03-16 RX ADMIN — Medication 1 DROP(S): at 11:07

## 2024-03-16 RX ADMIN — Medication 2: at 17:18

## 2024-03-16 RX ADMIN — Medication 125 MILLIGRAM(S): at 05:40

## 2024-03-16 RX ADMIN — Medication 125 MILLIGRAM(S): at 21:24

## 2024-03-16 NOTE — PROGRESS NOTE ADULT - NS ATTEND AMEND GEN_ALL_CORE FT
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     Pt with persistent encephalopathy with intermittent episodes of confusion and disorientation with trach/ling pulling in the setting of prolonged hospitalization with critical illness but now showing s/s improvement. CT Head (1/31) without acute pathologic anatomy. EEG (2/6) without epileptiform discharges. On ASA/Brilinta from prior hx CVAs (without known neurologic deficits) with known hx severe b/l internal carotid narrowing without occlusions. On home SSRI, but pt continued to have periods of agitation with line/trach pulling unable to receive Seroquel 2/2 prolonged QTc. Limiting benzos, prn changed to zyprexa but it did not help. Haldol tried but there was concern for increased EPS type sx. Switched again to Ativan prn with appropriate clinical response. Holding current depakote dose 2/2 increased ammonia levels. Decreased keppra simultaneously, as only in place as home medication for tremors/jerking (pt does not have any hx of seizure activity). Pt further remains physically weak and debilitated in the setting of prolonged critical illness with polyneuropathy. Will discuss with PT for possible OOB to chair if more awake.  CL Team consulted and recs appreciated.     Pt with cardiac hz as above now hemodynamically stable off vasopressors, on DMT as tolerated. TTE 2/24 with new biventricular failure with EF 38% and moderate MR/AS, repeat TTE 3/4 showing improved LVSF. Will c/w Lasix + carvedilol + hydralazine + statin, holding isordil for now with uptitration of current medications first. Will c/w ASA/Brilinta (GEMMA 5/22). Bedside POCUS performed 3/15, pt with normal lung aeration pattern without any PLEFs or evidence of RA/RV overload. Will hold diuresis for now with further daily bedside evaluations for volume status.  Cardiology following, recs appreciated.    Pt with acute hypoxemic respiratory failure 2/2 COVID19 +/- flash pulmonary edema with severe sepsis further c/b acute cholecystitis requiring ETT intubation/MV with failure to wean from ventilator s/p tracheostomy placement (2/13). Will c/w SBT trials as tolerated, pt doing well on 12/5 x2 days. Will continue PSV overnight if tolerates. Airway clearance therapy in place. Trach care and suctioning as per RCU team. Oral hygiene and aspiration precautions in place. Hemoptysis earlier in admission now resolved.     Pt with oropharyngeal dysphagia s/p PEG placement (GI, 2/20) now tolerating feeds at goal rate. Hospital course c/b acute cholecystitis s/p percutaneous asa (1/26), will remain until cholecystectomy. Abx course completed. SMA calcification initially found on CTA now s/p laparoscopy (12/24) with SMA angiogram and stent placement (12/25.) Will c/w DAPT. UGIB 2/2 esophagitis with bleeding Dieulafoy lesion s/p EGD (1/2) with clips. Will c/w PPI and Carafate. Hypernatremia resolved on free water, now held 2/2 concern for fluid overload. DMII well controlled on basal/bolus insulin with ISS and BGFS monitoring.     Pt with hospital course c/b COVID with superimposed PNA s/p tx followed by ESBL Klebsiella PNA s/p Zosyn followed by broadening to Ertapenem through 3/6. Now off Abx. Will CTM conservatively with low threshold to restart abx.     Dispo pending medical optimization. Pt full code. DVT ppx HSQ. Plan for dispo to LTCF with ventilator capabilities. Will continue to update pt and family throughout hospitalization.

## 2024-03-16 NOTE — CHART NOTE - NSCHARTNOTEFT_GEN_A_CORE
[facility] NIGHT MEDICINE COVERAGE    Events from day shift endorsed to writer, labs reviewed, unremarkable.    INCOMPLETE NOTE [facility] NIGHT MEDICINE COVERAGE    Events from day shift endorsed to writer, PM labs reviewed, unremarkable for any explanation w/ pt's mental status change.  VSS, satting well on ventilator w/o issues.  Other consideration is sedation from Ativan, last dose given at 2:27 AM on 3/16/2024 per eMAR.  On assessment, pt still minimally arousable to sternal rub, occasional grimace noted.  Eye exam is PERRLA.  Pt resisting manipulation of UE b/l.    Contacted pt's son, Lázaro, discussed pt's mental status change.  Recommended CT scan of the head w/o contrast to further evaluate.  Also spoke to pt's other son,  Devin, regarding above.  Sons in agreement w/ CT scan.  Also added on FT4 at insistence of son.  CT scan arranged by writer, personally expedited and transferred w/o issue.  CT read noted as below:    ACC: [id number] EXAM:  CT BRAIN   ORDERED BY: RANGEL NATARAJAN   PROCEDURE DATE:  03/16/2024    INTERPRETATION:  CLINICAL INDICATIONS:  AMS  COMPARISON: Head CT dated 1/31/2024    TECHNIQUE: Noncontrast CT of the head. Multiplanar reformations are   submitted.    FINDINGS:  There is periventricular and subcortical white matter hypodensity without   mass effect, nonspecific, likely representing mild chronic microvascular   ischemic changes. There is no compelling evidence for an acute   transcortical infarction. There is no evidence of mass, mass effect,   midline shift or extra-axial fluid collection. The lateral ventricles and   cortical sulci are age-appropriate in size and configuration. The patient   is status post bilateral ocular lens replacement surgery. Large bilateral   mastoid air cell effusions. The orbits and visualized paranasal sinuses   are unremarkable. The calvarium is intact. Consider MRI as clinically   warranted.    IMPRESSION:  Mild chronic microvascular changes without evidence of an   acute transcortical infarction or hemorrhage.    --- End of Report ---    KERLINE MOSLEY MD; Attending Radiologist  This document has been electronically signed. Mar 16 2024 10:25PM  <End of copied text>    Vital Signs Last 24 Hrs  T(C): 36.8 (16 Mar 2024 21:22), Max: 37.3 (16 Mar 2024 13:12)  T(F): 98.3 (16 Mar 2024 21:22), Max: 99.1 (16 Mar 2024 13:12)  HR: 86 (16 Mar 2024 21:22) (77 - 92)  BP: 140/53 (16 Mar 2024 21:22) (108/42 - 140/53)  RR: 22 (16 Mar 2024 21:22) (19 - 22)  SpO2: 99% (16 Mar 2024 21:22) (95% - 100%)  O2 Parameters below as of 16 Mar 2024 21:22  Patient On (Oxygen Delivery Method): ventilator  O2 Concentration (%): 40    LABS:                        8.6    8.62  )-----------( 281      ( 16 Mar 2024 18:05 )             26.7     03-16    145  |  102  |  44<H>  ----------------------------<  150<H>  4.5   |  32<H>  |  1.32<H>    Ca    8.8      16 Mar 2024 19:00  Phos  2.8     03-16  Mg     2.30     03-16    TPro  6.9  /  Alb  2.8<L>  /  TBili  0.2  /  DBili  x   /  AST  17  /  ALT  15  /  AlkPhos  134<H>  03-16  PT/INR - ( 16 Mar 2024 18:21 )   PT: 11.9 sec;   INR: 1.06 ratio    PTT - ( 16 Mar 2024 18:21 )  PTT:30.4 sec  <End of referenced text>    Will continue to monitor.    Rangel Natarajan PA-C  Department of [facility] Medicine - Night ACP  In-House Pager: #[phone]

## 2024-03-16 NOTE — CHART NOTE - NSCHARTNOTEFT_GEN_A_CORE
Called to bedside by nursing - pt on PS trial and noted to have blood in secretions and difficult to arouse  125/47 P 81 rr 18 Pulse ox 98%   HEENT PERRLA NO facial droop   Cor S1S2 RR  Chest Bilat coarse rhonchi   Abd soft nt nd   Skin wm/dry Called to bedside by nursing - pt on PS trial and noted to have blood in secretions and difficult to arouse  125/47 P 81 rr 18 Pulse ox 98%   HEENT PERRLA NO facial droop   Cor S1S2 RR  Chest Bilat coarse rhonchi   Abd soft nt nd   Skin wm/dry  Neuro- Opening eyes to tactile stimuli equal strength of extremties, resistive of ROM pulls away   AMS  Accucheck 158   Pt on PS during day - placed back on AC with lethergy check abg Becoming more awake with back on AC   Check depakote level - recently seen by psych with change in dosing /follow LFt's  Ammonia level elevated yesterday 58 - repeat level now -lactolose x 1 now while waiting   Check TSH not done since 1/2024   Afebrile and wbc nl - recheck cbc now   Follow up labs sent and pts becoming more awake   Hemoptysis   Blood tinged secretions seen Trach lavage done and secretions cleared   Check coags /cbc   Gentle suctioning as pt has had blood in secretions previously

## 2024-03-16 NOTE — PROGRESS NOTE ADULT - ASSESSMENT
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 Course ultimately complicated by progressive WOB and O2 demand and patient intubated and transferred to MICU (1/22). Course further complicated by acute cholecystitis and s/p Perc Lynsey with IR on (1/26), HFrEF 38, pulmonary edema, superimposed PNA, failed extubation failed and prolonged vent time and s/p trach (2/13). Patient transferred to RCU (2/16) and s/p PEG (2/20). Course complicated by volume overload and diuresis and GDMT continued and HFrEF 45 with improvement    NEUROLOGY   # Encephalopathy   - Hx of CVA with no residual deficits per family, however per family worsening confusion at home over the past 6 months (becoming disoriented to time) but prior to admission has been more confused, talking about seeing people that are not present.  - CTH (1/31) with no evidence of acute infarct  - Continue on ASA and Brilinta  - Holding Neurontin, Memantine and Oxycodone in setting of somnolence    # ICA stenosis   - CTA NECK (1/31) with moderate to severe narrowing left internal carotid at the origin. Severe narrowing right internal carotid by a tandem lesion 1.5 cm above the bifurcation with a narrowed internal carotid beyond the lesion. Extensive calcified plaque both cavernous and supraclinoid carotid arteries with narrowing but no occlusion. Mild narrowing proximal right M1 segment. Moderate narrowing both vertebral V4 segments. No perfusion abnormality at the Tmax 6 second threshold, but moderate hypoperfusion in the right MCA territory at the 4 second threshold.   - Continue on ASA and Brilinta    # Myoclonic jerks   - Hx of myoclonic jerks post CVAs   - EEG (1/18-2/6) negative for seizures  - Continue on Keppra and per neuro/ psych wishes to wean, family in agreement   - Currently down titrating Keppra 500 mg BID, now Keppra 250 mg BID 3/13 - )    # Depression/ Insomnia  - Continue on Lexapro per home medication   - Course complicated by agitation and pulling on tubes   - Home Seroquel discontinued as patient now on Valproic acid syrup   - S/p Ativan 0.5mg PO Q6H PRN and Haldol 0.25 mg Q6H for severe agitation, now IM Zypexa 1.25 Q8H PRN for agitation    - Supportive care     # Agitation in setting of delirium / underlying vascular dementia   - 3/11 Psychiatry consulted, started Depakote 125 mg PO BID, increased to 250 mg PO BID 3/12  and additional 125 mg PO QD added at 1300 on 3/14   - monitor platelets, LFTs while on Depakote   - C/w VPA TID (250mg QAM, 125mg afternoon, 375mg QHS)  - Better response to Ativan than Zyprexa so will c/w Ativan 0.5mg Q6H PRN    CARDIOLOGY   # Vasoplegic vs septic shock  # Hx of HTN   - Weaned off pressors in ICU and now with mild hypertension   - Continue on coreg and hydral as below   - Strict BP control given moderate AS  - Increased vascular congestion on CXR (3/9) so will give additional Lasix 20mg PO x1 (3/10)    # AFIB RVR   # Bifascicular Block with Medtronic PPM   - AFIB RVR episodes and PPM interrogated (2/20) by EP with similar episodes  - Previous admission in 6/2022 with cardiogenic syncope second to bifasicular block, however now with PPM and AV myron blockers ok.   - Continue on lopressor 12.5mg BID however replaced with coreg second to HFrEF   - AC discussed at length however with recent GIB will hold for now     # HFrEF 38 likely stress induced?   # Mild to moderate AS   - ECHO (12/2023) with EF 62 with normal LVSF and mild to moderate AS   - ECHO (2/2024) with EF 38, moderate LVSD with global LV hypokinesis, mildly reduced RVSF (TAPSE 1.3), mild to moderate MR, mild AR, and moderate AS.   - RPT ECHO (3/4) with EF 45 and mild to moderate LVSD   - Continue on lasix 20 QD, coreg 6.25 and hydralazine 50 (increased 3/4)   - Hold isordil and up titrate above medications first     # CAD with CABG   - CATH (5/2022) with dLAD 70, SVG graft to OM1 with 90 in stent stenosis s/p PCI and GEMMA placement, and LIMA graft to mLAD with no disease.   - Continue on ASA and brilinta    # Prolonged QTC (Resolved)  - ECG (3/2) with QTC 616ms (corrected using bazzet 490ms)   - ECG (3/3) with QTC 634ms (corrected using bazzet 490ms)   - ECG (3/11) with QTC 490ms   - EKG (3/15) QTc 498ms  - Monitor off/ avoid QTC prolonging agents for now    RESPIRATORY   # Acute respiratory failure second to SARS-COV-2 vs pulm edema vs PNA  - Presented with COVID complicated by sepsis second to acute lynsey and worsening respiratory failure second to pulmonary edema requiring intubation.   - Prolonged intubation and s/p tracheostomy (2/13)   - CT CHEST 1/16 with new small bilateral pleural effusions/ atelectasis/ patchy bilateral ground-glass opacities are consistent with pulmonary edema.  - Completed ABX and COVID regimen as below   - Continue on vent and initially tolerating some SBT 12/5  - Continue on nebs and hold further HTS given intermittent hemoptysis  - Continue on diuresis as above    # Mild hemoptysis likely from suction trauma   - s/p bronch (2/18) with no active bleed  - Hemoptysis improved with TXA and holding further HTS as above   - Tiny hemoptysis second to suction trauma imporving  - Careful with suctioning   - Recurrent hemoptysis (3/9) so ordered for TXA nebs again however hemoptysis appeared self limited and stopped before nebs even given    HEENT   # L eye subconjunctival hemorrhage   - Continue on artifical tears and Lacrilube   - Optho eval appreciated     GI  # Nutrition/ Dysphagia   - PEG placed by GI on 2/20 and tube feeds continued.   - Loose stools and Banatrol continued     # UGIB   - EGD (1/2) with esophagitis and bleeding Dieulafoy's lesion s/p clips.   - Continue on PPI and carafate     # SMA calcification   - CTA demonstrating mesenteric fat stranding associated with ascending/transverse colon  - Diagnostic laparoscopy (12/24) with small bowel and visible colon viable, some inflammation of omentum in RUQ  - SMA Angiogram and stent placed (12/25).   - Continue on ASA and Brilinta     # Acute Cholecystitis   - CT (12/26) with distended GB with cholelithiasis and sludge   - HIDA (12/29) POSITIVE for acute cholecystitis   - Case discussed with IR at length and s/p PERC LYNSEY (1/26)   - Completed zosyn course (12/24-12/31)   - PERC LYNSEY tube to stay in until surgical candidate for cholecystectomy to prevent recurrence     / RENAL   # CKD   - CRE at baseline   - Monitor renal function and UOP   - Aranesp SQ x1 (3/8)    # Hypernatremia (resolved)   - s/p  Q4H  - Monitor closely with diuresis as above   - Stop FWF (3/15) given c/f worsening vol overload       INFECTIOUS DISEASE   # COVID with superimposed PNA   # ESBL Kleb PNA   - COVID POSITIVE (12/23) and completed remdesivir x 1 dose and paxlovid course.   - WOB worsened as above requiring intubation and cultures negative. Zosyn completed empirically however hypothermic (2/11) and BCx, UA, RVP and BAL negative.   - Completed additional zosyn (2/12-2/19) empirically   - Fever spike and thick secretions and SCX (2/26) with ESBL klebs.   - s/p Zosyn (2/27 - 2/29) but resistant and completed Erta (2/29 - 3/6)     HEME  # AOCD   - Monitor HH   - DVT PPX with HSQ     ENDOCRINE   # DM2 A1C 9.3   - Continue on Lantus 14 and ISS     SKIN/ TUBES   - Trach (2/13)   - PEG (2/20)   - PERC LYNSEY (1/26 - )     ETHICS   - FULL CODE     DISPO - vent facility and family aware. SW with accepting facility however pending available bed. D

## 2024-03-16 NOTE — PROGRESS NOTE ADULT - SUBJECTIVE AND OBJECTIVE BOX
CHIEF COMPLAINT: Patient is a 89y old  Male who presents with a chief complaint of COVID19, Chest pain (15 Mar 2024 15:48)      Interval Events:    REVIEW OF SYSTEMS:  [ ] All other systems negative  [ ] Unable to assess ROS because ________    Mode: AC/ CMV (Assist Control/ Continuous Mandatory Ventilation), RR (machine): 16, TV (machine): 400, FiO2: 30, PEEP: 5, ITime: 0.8, MAP: 12, PIP: 36  Arterial Blood Gas:  03-15 @ 21:56  7.44/50/130/34/98.9/8.8  ABG lactate: --      OBJECTIVE:  ICU Vital Signs Last 24 Hrs  T(C): 37 (16 Mar 2024 04:35), Max: 37.2 (15 Mar 2024 13:18)  T(F): 98.6 (16 Mar 2024 04:35), Max: 98.9 (15 Mar 2024 13:18)  HR: 87 (16 Mar 2024 04:35) (79 - 92)  BP: 130/45 (16 Mar 2024 04:35) (109/45 - 130/45)  BP(mean): --  ABP: --  ABP(mean): --  RR: 22 (16 Mar 2024 04:35) (15 - 25)  SpO2: 98% (16 Mar 2024 04:35) (94% - 99%)    O2 Parameters below as of 16 Mar 2024 04:35  Patient On (Oxygen Delivery Method): ventilator    O2 Concentration (%): 40      Mode: AC/ CMV (Assist Control/ Continuous Mandatory Ventilation), RR (machine): 16, TV (machine): 400, FiO2: 30, PEEP: 5, ITime: 0.8, MAP: 12, PIP: 36    03-15 @ 07:01  -  03-16 @ 07:00  --------------------------------------------------------  IN: 1300 mL / OUT: 1035 mL / NET: 265 mL      CAPILLARY BLOOD GLUCOSE      POCT Blood Glucose.: 142 mg/dL (16 Mar 2024 05:32)      HOSPITAL MEDICATIONS:  MEDICATIONS  (STANDING):  albuterol/ipratropium for Nebulization 3 milliLiter(s) Nebulizer every 12 hours  artificial  tears Solution 1 Drop(s) Left EYE four times a day  aspirin  chewable 81 milliGRAM(s) Enteral Tube daily  carvedilol 6.25 milliGRAM(s) Oral every 12 hours  chlorhexidine 0.12% Liquid 15 milliLiter(s) Oral Mucosa every 12 hours  chlorhexidine 2% Cloths 1 Application(s) Topical daily  dextrose 5%. 1000 milliLiter(s) (50 mL/Hr) IV Continuous <Continuous>  dextrose 5%. 1000 milliLiter(s) (100 mL/Hr) IV Continuous <Continuous>  dextrose 50% Injectable 25 Gram(s) IV Push once  dextrose 50% Injectable 12.5 Gram(s) IV Push once  dextrose 50% Injectable 25 Gram(s) IV Push once  doxazosin 2 milliGRAM(s) Oral at bedtime  escitalopram Solution 10 milliGRAM(s) Oral daily  furosemide    Tablet 20 milliGRAM(s) Oral daily  glucagon  Injectable 1 milliGRAM(s) IntraMuscular once  heparin   Injectable 5000 Unit(s) SubCutaneous every 8 hours  hydrALAZINE 25 milliGRAM(s) Oral every 8 hours  insulin glargine Injectable (LANTUS) 14 Unit(s) SubCutaneous <User Schedule>  insulin lispro (ADMELOG) corrective regimen sliding scale   SubCutaneous every 6 hours  levETIRAcetam  Solution 250 milliGRAM(s) Oral two times a day  melatonin 6 milliGRAM(s) Oral at bedtime  pantoprazole   Suspension 40 milliGRAM(s) Oral every 12 hours  petrolatum Ophthalmic Ointment 1 Application(s) Left EYE at bedtime  polyethylene glycol 3350 17 Gram(s) Oral daily  senna Syrup 10 milliLiter(s) Oral at bedtime  sucralfate suspension 1 Gram(s) Enteral Tube two times a day  ticagrelor 60 milliGRAM(s) Oral every 12 hours  valproic  acid Syrup 125 milliGRAM(s) Oral every 8 hours    MEDICATIONS  (PRN):  acetaminophen   Oral Liquid .. 650 milliGRAM(s) Oral every 6 hours PRN Temp greater or equal to 38C (100.4F), Mild Pain (1 - 3), Moderate Pain (4 - 6)  dextrose Oral Gel 15 Gram(s) Oral once PRN Blood Glucose LESS THAN 70 milliGRAM(s)/deciliter  LORazepam     Tablet 0.5 milliGRAM(s) Oral every 6 hours PRN Agitation      LABS:    03-15    136  |  96<L>  |  45<H>  ----------------------------<  163<H>  4.6   |  28  |  1.34<H>    Ca    8.8      15 Mar 2024 05:30  Phos  3.4     03-15  Mg     2.40     03-15    TPro  7.2  /  Alb  3.0<L>  /  TBili  0.3  /  DBili  x   /  AST  21  /  ALT  17  /  AlkPhos  173<H>  03-15      Urinalysis Basic - ( 15 Mar 2024 05:30 )    Color: x / Appearance: x / SG: x / pH: x  Gluc: 163 mg/dL / Ketone: x  / Bili: x / Urobili: x   Blood: x / Protein: x / Nitrite: x   Leuk Esterase: x / RBC: x / WBC x   Sq Epi: x / Non Sq Epi: x / Bacteria: x      Arterial Blood Gas:  03-15 @ 21:56  7.44/50/130/34/98.9/8.8  ABG lactate: --        PAST MEDICAL & SURGICAL HISTORY:  Hyperlipemia      Hypertension      Coronary Artery Disease      Diabetes Mellitus Type II      Stented Coronary Artery  total 5 stents, last stent 5/2019      Neuropathy      Myocardial infarction      Stroke  mild left facial numbness   no other residuals verbalized      Myoclonic jerking      Stage 3 chronic kidney disease      History of Cataract Extraction      Hx of CABG      H/O coronary angiogram      S/P coronary artery stent placement  1/6/09      S/P placement of cardiac pacemaker          FAMILY HISTORY:  No pertinent family history in first degree relatives        Social History:  Lives with family  Ambulates with walker  Denies tobacco, EtOH, or illicit substance use (23 Dec 2023 22:51)      RADIOLOGY:  [ ] Reviewed and interpreted by me    PULMONARY FUNCTION TESTS:    EKG: CHIEF COMPLAINT: Patient is a 89y old  Male who presents with a chief complaint of COVID19, Chest pain (15 Mar 2024 15:48)      Interval Events: High pressure alarms on vent overnight     REVIEW OF SYSTEMS:  [x ] Unable to assess ROS because AMS    Mode: AC/ CMV (Assist Control/ Continuous Mandatory Ventilation), RR (machine): 16, TV (machine): 400, FiO2: 30, PEEP: 5, ITime: 0.8, MAP: 12, PIP: 36  Arterial Blood Gas:  03-15 @ 21:56  7.44/50/130/34/98.9/8.8  ABG lactate: --      OBJECTIVE:  ICU Vital Signs Last 24 Hrs  T(C): 37 (16 Mar 2024 04:35), Max: 37.2 (15 Mar 2024 13:18)  T(F): 98.6 (16 Mar 2024 04:35), Max: 98.9 (15 Mar 2024 13:18)  HR: 87 (16 Mar 2024 04:35) (79 - 92)  BP: 130/45 (16 Mar 2024 04:35) (109/45 - 130/45)  BP(mean): --  ABP: --  ABP(mean): --  RR: 22 (16 Mar 2024 04:35) (15 - 25)  SpO2: 98% (16 Mar 2024 04:35) (94% - 99%)    O2 Parameters below as of 16 Mar 2024 04:35  Patient On (Oxygen Delivery Method): ventilator    O2 Concentration (%): 40      Mode: AC/ CMV (Assist Control/ Continuous Mandatory Ventilation), RR (machine): 16, TV (machine): 400, FiO2: 30, PEEP: 5, ITime: 0.8, MAP: 12, PIP: 36    03-15 @ 07:01  -  03-16 @ 07:00  --------------------------------------------------------  IN: 1300 mL / OUT: 1035 mL / NET: 265 mL      CAPILLARY BLOOD GLUCOSE      POCT Blood Glucose.: 142 mg/dL (16 Mar 2024 05:32)      HOSPITAL MEDICATIONS:  MEDICATIONS  (STANDING):  albuterol/ipratropium for Nebulization 3 milliLiter(s) Nebulizer every 12 hours  artificial  tears Solution 1 Drop(s) Left EYE four times a day  aspirin  chewable 81 milliGRAM(s) Enteral Tube daily  carvedilol 6.25 milliGRAM(s) Oral every 12 hours  chlorhexidine 0.12% Liquid 15 milliLiter(s) Oral Mucosa every 12 hours  chlorhexidine 2% Cloths 1 Application(s) Topical daily  dextrose 5%. 1000 milliLiter(s) (50 mL/Hr) IV Continuous <Continuous>  dextrose 5%. 1000 milliLiter(s) (100 mL/Hr) IV Continuous <Continuous>  dextrose 50% Injectable 25 Gram(s) IV Push once  dextrose 50% Injectable 12.5 Gram(s) IV Push once  dextrose 50% Injectable 25 Gram(s) IV Push once  doxazosin 2 milliGRAM(s) Oral at bedtime  escitalopram Solution 10 milliGRAM(s) Oral daily  furosemide    Tablet 20 milliGRAM(s) Oral daily  glucagon  Injectable 1 milliGRAM(s) IntraMuscular once  heparin   Injectable 5000 Unit(s) SubCutaneous every 8 hours  hydrALAZINE 25 milliGRAM(s) Oral every 8 hours  insulin glargine Injectable (LANTUS) 14 Unit(s) SubCutaneous <User Schedule>  insulin lispro (ADMELOG) corrective regimen sliding scale   SubCutaneous every 6 hours  levETIRAcetam  Solution 250 milliGRAM(s) Oral two times a day  melatonin 6 milliGRAM(s) Oral at bedtime  pantoprazole   Suspension 40 milliGRAM(s) Oral every 12 hours  petrolatum Ophthalmic Ointment 1 Application(s) Left EYE at bedtime  polyethylene glycol 3350 17 Gram(s) Oral daily  senna Syrup 10 milliLiter(s) Oral at bedtime  sucralfate suspension 1 Gram(s) Enteral Tube two times a day  ticagrelor 60 milliGRAM(s) Oral every 12 hours  valproic  acid Syrup 125 milliGRAM(s) Oral every 8 hours    MEDICATIONS  (PRN):  acetaminophen   Oral Liquid .. 650 milliGRAM(s) Oral every 6 hours PRN Temp greater or equal to 38C (100.4F), Mild Pain (1 - 3), Moderate Pain (4 - 6)  dextrose Oral Gel 15 Gram(s) Oral once PRN Blood Glucose LESS THAN 70 milliGRAM(s)/deciliter  LORazepam     Tablet 0.5 milliGRAM(s) Oral every 6 hours PRN Agitation      LABS:    03-15    136  |  96<L>  |  45<H>  ----------------------------<  163<H>  4.6   |  28  |  1.34<H>    Ca    8.8      15 Mar 2024 05:30  Phos  3.4     03-15  Mg     2.40     03-15    TPro  7.2  /  Alb  3.0<L>  /  TBili  0.3  /  DBili  x   /  AST  21  /  ALT  17  /  AlkPhos  173<H>  03-15      Urinalysis Basic - ( 15 Mar 2024 05:30 )    Color: x / Appearance: x / SG: x / pH: x  Gluc: 163 mg/dL / Ketone: x  / Bili: x / Urobili: x   Blood: x / Protein: x / Nitrite: x   Leuk Esterase: x / RBC: x / WBC x   Sq Epi: x / Non Sq Epi: x / Bacteria: x      Arterial Blood Gas:  03-15 @ 21:56  7.44/50/130/34/98.9/8.8  ABG lactate: --        PAST MEDICAL & SURGICAL HISTORY:  Hyperlipemia      Hypertension      Coronary Artery Disease      Diabetes Mellitus Type II      Stented Coronary Artery  total 5 stents, last stent 5/2019      Neuropathy      Myocardial infarction      Stroke  mild left facial numbness   no other residuals verbalized      Myoclonic jerking      Stage 3 chronic kidney disease      History of Cataract Extraction      Hx of CABG      H/O coronary angiogram      S/P coronary artery stent placement  1/6/09      S/P placement of cardiac pacemaker          FAMILY HISTORY:  No pertinent family history in first degree relatives        Social History:  Lives with family  Ambulates with walker  Denies tobacco, EtOH, or illicit substance use (23 Dec 2023 22:51)      RADIOLOGY:  [ ] Reviewed and interpreted by me    PULMONARY FUNCTION TESTS:    EKG:

## 2024-03-17 LAB
ALBUMIN SERPL ELPH-MCNC: 2.7 G/DL — LOW (ref 3.3–5)
ALP SERPL-CCNC: 125 U/L — HIGH (ref 40–120)
ALT FLD-CCNC: 12 U/L — SIGNIFICANT CHANGE UP (ref 4–41)
AMMONIA BLD-MCNC: 38 UMOL/L — SIGNIFICANT CHANGE UP (ref 11–55)
ANION GAP SERPL CALC-SCNC: 11 MMOL/L — SIGNIFICANT CHANGE UP (ref 7–14)
AST SERPL-CCNC: 18 U/L — SIGNIFICANT CHANGE UP (ref 4–40)
BASOPHILS # BLD AUTO: 0.03 K/UL — SIGNIFICANT CHANGE UP (ref 0–0.2)
BASOPHILS NFR BLD AUTO: 0.3 % — SIGNIFICANT CHANGE UP (ref 0–2)
BILIRUB SERPL-MCNC: 0.3 MG/DL — SIGNIFICANT CHANGE UP (ref 0.2–1.2)
BUN SERPL-MCNC: 40 MG/DL — HIGH (ref 7–23)
CALCIUM SERPL-MCNC: 8.6 MG/DL — SIGNIFICANT CHANGE UP (ref 8.4–10.5)
CHLORIDE SERPL-SCNC: 99 MMOL/L — SIGNIFICANT CHANGE UP (ref 98–107)
CO2 SERPL-SCNC: 29 MMOL/L — SIGNIFICANT CHANGE UP (ref 22–31)
CREAT SERPL-MCNC: 1.24 MG/DL — SIGNIFICANT CHANGE UP (ref 0.5–1.3)
EGFR: 55 ML/MIN/1.73M2 — LOW
EOSINOPHIL # BLD AUTO: 0.67 K/UL — HIGH (ref 0–0.5)
EOSINOPHIL NFR BLD AUTO: 6.9 % — HIGH (ref 0–6)
GAS PNL BLDV: SIGNIFICANT CHANGE UP
GLUCOSE BLDC GLUCOMTR-MCNC: 118 MG/DL — HIGH (ref 70–99)
GLUCOSE BLDC GLUCOMTR-MCNC: 142 MG/DL — HIGH (ref 70–99)
GLUCOSE BLDC GLUCOMTR-MCNC: 153 MG/DL — HIGH (ref 70–99)
GLUCOSE BLDC GLUCOMTR-MCNC: 157 MG/DL — HIGH (ref 70–99)
GLUCOSE BLDC GLUCOMTR-MCNC: 164 MG/DL — HIGH (ref 70–99)
GLUCOSE SERPL-MCNC: 130 MG/DL — HIGH (ref 70–99)
HCT VFR BLD CALC: 28.1 % — LOW (ref 39–50)
HGB BLD-MCNC: 8.8 G/DL — LOW (ref 13–17)
IANC: 5.98 K/UL — SIGNIFICANT CHANGE UP (ref 1.8–7.4)
IMM GRANULOCYTES NFR BLD AUTO: 0.4 % — SIGNIFICANT CHANGE UP (ref 0–0.9)
LYMPHOCYTES # BLD AUTO: 2.31 K/UL — SIGNIFICANT CHANGE UP (ref 1–3.3)
LYMPHOCYTES # BLD AUTO: 23.8 % — SIGNIFICANT CHANGE UP (ref 13–44)
MAGNESIUM SERPL-MCNC: 2.2 MG/DL — SIGNIFICANT CHANGE UP (ref 1.6–2.6)
MCHC RBC-ENTMCNC: 29 PG — SIGNIFICANT CHANGE UP (ref 27–34)
MCHC RBC-ENTMCNC: 31.3 GM/DL — LOW (ref 32–36)
MCV RBC AUTO: 92.7 FL — SIGNIFICANT CHANGE UP (ref 80–100)
MONOCYTES # BLD AUTO: 0.66 K/UL — SIGNIFICANT CHANGE UP (ref 0–0.9)
MONOCYTES NFR BLD AUTO: 6.8 % — SIGNIFICANT CHANGE UP (ref 2–14)
NEUTROPHILS # BLD AUTO: 5.98 K/UL — SIGNIFICANT CHANGE UP (ref 1.8–7.4)
NEUTROPHILS NFR BLD AUTO: 61.8 % — SIGNIFICANT CHANGE UP (ref 43–77)
NRBC # BLD: 0 /100 WBCS — SIGNIFICANT CHANGE UP (ref 0–0)
NRBC # FLD: 0 K/UL — SIGNIFICANT CHANGE UP (ref 0–0)
PHOSPHATE SERPL-MCNC: 2.6 MG/DL — SIGNIFICANT CHANGE UP (ref 2.5–4.5)
PLATELET # BLD AUTO: 306 K/UL — SIGNIFICANT CHANGE UP (ref 150–400)
POTASSIUM SERPL-MCNC: 4.3 MMOL/L — SIGNIFICANT CHANGE UP (ref 3.5–5.3)
POTASSIUM SERPL-SCNC: 4.3 MMOL/L — SIGNIFICANT CHANGE UP (ref 3.5–5.3)
PROT SERPL-MCNC: 6.8 G/DL — SIGNIFICANT CHANGE UP (ref 6–8.3)
RBC # BLD: 3.03 M/UL — LOW (ref 4.2–5.8)
RBC # FLD: 16.2 % — HIGH (ref 10.3–14.5)
SODIUM SERPL-SCNC: 139 MMOL/L — SIGNIFICANT CHANGE UP (ref 135–145)
WBC # BLD: 9.69 K/UL — SIGNIFICANT CHANGE UP (ref 3.8–10.5)
WBC # FLD AUTO: 9.69 K/UL — SIGNIFICANT CHANGE UP (ref 3.8–10.5)

## 2024-03-17 PROCEDURE — 99233 SBSQ HOSP IP/OBS HIGH 50: CPT | Mod: FS

## 2024-03-17 RX ORDER — HYDROMORPHONE HYDROCHLORIDE 2 MG/ML
0.5 INJECTION INTRAMUSCULAR; INTRAVENOUS; SUBCUTANEOUS ONCE
Refills: 0 | Status: DISCONTINUED | OUTPATIENT
Start: 2024-03-17 | End: 2024-03-17

## 2024-03-17 RX ORDER — QUETIAPINE FUMARATE 200 MG/1
12.5 TABLET, FILM COATED ORAL ONCE
Refills: 0 | Status: COMPLETED | OUTPATIENT
Start: 2024-03-17 | End: 2024-03-17

## 2024-03-17 RX ADMIN — Medication 1 DROP(S): at 12:24

## 2024-03-17 RX ADMIN — Medication 25 MILLIGRAM(S): at 23:03

## 2024-03-17 RX ADMIN — PANTOPRAZOLE SODIUM 40 MILLIGRAM(S): 20 TABLET, DELAYED RELEASE ORAL at 17:35

## 2024-03-17 RX ADMIN — HEPARIN SODIUM 5000 UNIT(S): 5000 INJECTION INTRAVENOUS; SUBCUTANEOUS at 05:19

## 2024-03-17 RX ADMIN — Medication 2 MILLIGRAM(S): at 23:01

## 2024-03-17 RX ADMIN — CHLORHEXIDINE GLUCONATE 1 APPLICATION(S): 213 SOLUTION TOPICAL at 12:24

## 2024-03-17 RX ADMIN — Medication 81 MILLIGRAM(S): at 12:21

## 2024-03-17 RX ADMIN — Medication 1 APPLICATION(S): at 23:03

## 2024-03-17 RX ADMIN — Medication 25 MILLIGRAM(S): at 05:19

## 2024-03-17 RX ADMIN — Medication 1 GRAM(S): at 05:18

## 2024-03-17 RX ADMIN — SENNA PLUS 10 MILLILITER(S): 8.6 TABLET ORAL at 23:01

## 2024-03-17 RX ADMIN — LEVETIRACETAM 250 MILLIGRAM(S): 250 TABLET, FILM COATED ORAL at 05:18

## 2024-03-17 RX ADMIN — PANTOPRAZOLE SODIUM 40 MILLIGRAM(S): 20 TABLET, DELAYED RELEASE ORAL at 05:19

## 2024-03-17 RX ADMIN — Medication 3 MILLILITER(S): at 20:25

## 2024-03-17 RX ADMIN — Medication 0.25 MILLIGRAM(S): at 17:31

## 2024-03-17 RX ADMIN — QUETIAPINE FUMARATE 12.5 MILLIGRAM(S): 200 TABLET, FILM COATED ORAL at 13:42

## 2024-03-17 RX ADMIN — Medication 20 MILLIGRAM(S): at 05:19

## 2024-03-17 RX ADMIN — TICAGRELOR 60 MILLIGRAM(S): 90 TABLET ORAL at 17:35

## 2024-03-17 RX ADMIN — INSULIN GLARGINE 14 UNIT(S): 100 INJECTION, SOLUTION SUBCUTANEOUS at 12:30

## 2024-03-17 RX ADMIN — Medication 25 MILLIGRAM(S): at 14:39

## 2024-03-17 RX ADMIN — Medication 125 MILLIGRAM(S): at 05:19

## 2024-03-17 RX ADMIN — Medication 1 GRAM(S): at 17:34

## 2024-03-17 RX ADMIN — CHLORHEXIDINE GLUCONATE 15 MILLILITER(S): 213 SOLUTION TOPICAL at 17:37

## 2024-03-17 RX ADMIN — LEVETIRACETAM 250 MILLIGRAM(S): 250 TABLET, FILM COATED ORAL at 17:36

## 2024-03-17 RX ADMIN — Medication 3 MILLILITER(S): at 06:23

## 2024-03-17 RX ADMIN — CARVEDILOL PHOSPHATE 6.25 MILLIGRAM(S): 80 CAPSULE, EXTENDED RELEASE ORAL at 17:33

## 2024-03-17 RX ADMIN — Medication 125 MILLIGRAM(S): at 23:00

## 2024-03-17 RX ADMIN — HYDROMORPHONE HYDROCHLORIDE 0.5 MILLIGRAM(S): 2 INJECTION INTRAMUSCULAR; INTRAVENOUS; SUBCUTANEOUS at 14:39

## 2024-03-17 RX ADMIN — ESCITALOPRAM OXALATE 10 MILLIGRAM(S): 10 TABLET, FILM COATED ORAL at 12:20

## 2024-03-17 RX ADMIN — Medication 2: at 12:32

## 2024-03-17 RX ADMIN — CHLORHEXIDINE GLUCONATE 15 MILLILITER(S): 213 SOLUTION TOPICAL at 05:21

## 2024-03-17 RX ADMIN — Medication 1 DROP(S): at 05:21

## 2024-03-17 RX ADMIN — CARVEDILOL PHOSPHATE 6.25 MILLIGRAM(S): 80 CAPSULE, EXTENDED RELEASE ORAL at 05:20

## 2024-03-17 RX ADMIN — HEPARIN SODIUM 5000 UNIT(S): 5000 INJECTION INTRAVENOUS; SUBCUTANEOUS at 14:39

## 2024-03-17 RX ADMIN — Medication 125 MILLIGRAM(S): at 14:40

## 2024-03-17 RX ADMIN — Medication 6 MILLIGRAM(S): at 23:01

## 2024-03-17 RX ADMIN — Medication 1 DROP(S): at 17:33

## 2024-03-17 RX ADMIN — TICAGRELOR 60 MILLIGRAM(S): 90 TABLET ORAL at 05:20

## 2024-03-17 RX ADMIN — HEPARIN SODIUM 5000 UNIT(S): 5000 INJECTION INTRAVENOUS; SUBCUTANEOUS at 23:00

## 2024-03-17 RX ADMIN — Medication 2: at 05:21

## 2024-03-17 NOTE — PROGRESS NOTE ADULT - SUBJECTIVE AND OBJECTIVE BOX
CHIEF COMPLAINT: Patient is a 89y old  Male who presents with a chief complaint of COVID19, Chest pain (16 Mar 2024 07:19)      Interval Events:    REVIEW OF SYSTEMS:  [ ] All other systems negative  [ ] Unable to assess ROS because ________    Mode: CPAP with PS, FiO2: 30, PEEP: 5, PS: 12, MAP: 11, PIP: 17  Arterial Blood Gas:  03-15 @ 21:56  7.44/50/130/34/98.9/8.8  ABG lactate: --      OBJECTIVE:  ICU Vital Signs Last 24 Hrs  T(C): 36.7 (17 Mar 2024 04:00), Max: 37.3 (16 Mar 2024 13:12)  T(F): 98.1 (17 Mar 2024 04:00), Max: 99.1 (16 Mar 2024 13:12)  HR: 86 (17 Mar 2024 06:25) (75 - 88)  BP: 115/40 (17 Mar 2024 04:00) (108/42 - 140/53)  BP(mean): --  ABP: --  ABP(mean): --  RR: 22 (17 Mar 2024 04:00) (19 - 22)  SpO2: 96% (17 Mar 2024 06:25) (96% - 100%)    O2 Parameters below as of 17 Mar 2024 06:25  Patient On (Oxygen Delivery Method): ventilator          Mode: CPAP with PS, FiO2: 30, PEEP: 5, PS: 12, MAP: 11, PIP: 17    03-16 @ 07:01  -  03-17 @ 07:00  --------------------------------------------------------  IN: 1300 mL / OUT: 530 mL / NET: 770 mL      CAPILLARY BLOOD GLUCOSE      POCT Blood Glucose.: 157 mg/dL (17 Mar 2024 05:16)      HOSPITAL MEDICATIONS:  MEDICATIONS  (STANDING):  albuterol/ipratropium for Nebulization 3 milliLiter(s) Nebulizer every 12 hours  artificial  tears Solution 1 Drop(s) Left EYE four times a day  aspirin  chewable 81 milliGRAM(s) Enteral Tube daily  carvedilol 6.25 milliGRAM(s) Oral every 12 hours  chlorhexidine 0.12% Liquid 15 milliLiter(s) Oral Mucosa every 12 hours  chlorhexidine 2% Cloths 1 Application(s) Topical daily  dextrose 5%. 1000 milliLiter(s) (100 mL/Hr) IV Continuous <Continuous>  dextrose 5%. 1000 milliLiter(s) (50 mL/Hr) IV Continuous <Continuous>  dextrose 50% Injectable 25 Gram(s) IV Push once  dextrose 50% Injectable 25 Gram(s) IV Push once  dextrose 50% Injectable 12.5 Gram(s) IV Push once  doxazosin 2 milliGRAM(s) Oral at bedtime  escitalopram Solution 10 milliGRAM(s) Oral daily  furosemide    Tablet 20 milliGRAM(s) Oral daily  glucagon  Injectable 1 milliGRAM(s) IntraMuscular once  heparin   Injectable 5000 Unit(s) SubCutaneous every 8 hours  hydrALAZINE 25 milliGRAM(s) Oral every 8 hours  insulin glargine Injectable (LANTUS) 14 Unit(s) SubCutaneous <User Schedule>  insulin lispro (ADMELOG) corrective regimen sliding scale   SubCutaneous every 6 hours  levETIRAcetam  Solution 250 milliGRAM(s) Oral two times a day  melatonin 6 milliGRAM(s) Oral at bedtime  pantoprazole   Suspension 40 milliGRAM(s) Oral every 12 hours  petrolatum Ophthalmic Ointment 1 Application(s) Left EYE at bedtime  polyethylene glycol 3350 17 Gram(s) Oral daily  senna Syrup 10 milliLiter(s) Oral at bedtime  sucralfate suspension 1 Gram(s) Enteral Tube two times a day  ticagrelor 60 milliGRAM(s) Oral every 12 hours  valproic  acid Syrup 125 milliGRAM(s) Oral every 8 hours    MEDICATIONS  (PRN):  acetaminophen   Oral Liquid .. 650 milliGRAM(s) Oral every 6 hours PRN Temp greater or equal to 38C (100.4F), Mild Pain (1 - 3), Moderate Pain (4 - 6)  dextrose Oral Gel 15 Gram(s) Oral once PRN Blood Glucose LESS THAN 70 milliGRAM(s)/deciliter      LABS:                        8.8    9.69  )-----------( 306      ( 17 Mar 2024 05:48 )             28.1     03-17    139  |  99  |  40<H>  ----------------------------<  130<H>  4.3   |  29  |  1.24    Ca    8.6      17 Mar 2024 05:48  Phos  2.6     03-17  Mg     2.20     03-17    TPro  6.8  /  Alb  2.7<L>  /  TBili  0.3  /  DBili  x   /  AST  18  /  ALT  12  /  AlkPhos  125<H>  03-17    PT/INR - ( 16 Mar 2024 18:21 )   PT: 11.9 sec;   INR: 1.06 ratio         PTT - ( 16 Mar 2024 18:21 )  PTT:30.4 sec  Urinalysis Basic - ( 17 Mar 2024 05:48 )    Color: x / Appearance: x / SG: x / pH: x  Gluc: 130 mg/dL / Ketone: x  / Bili: x / Urobili: x   Blood: x / Protein: x / Nitrite: x   Leuk Esterase: x / RBC: x / WBC x   Sq Epi: x / Non Sq Epi: x / Bacteria: x      Arterial Blood Gas:  03-15 @ 21:56  7.44/50/130/34/98.9/8.8  ABG lactate: --    Venous Blood Gas:  03-17 @ 05:48  7.40/55/32/34/49.7  VBG Lactate: 2.7  Venous Blood Gas:  03-16 @ 18:05  7.45/51/49/35/80.9  VBG Lactate: 1.8      PAST MEDICAL & SURGICAL HISTORY:  Hyperlipemia      Hypertension      Coronary Artery Disease      Diabetes Mellitus Type II      Stented Coronary Artery  total 5 stents, last stent 5/2019      Neuropathy      Myocardial infarction      Stroke  mild left facial numbness   no other residuals verbalized      Myoclonic jerking      Stage 3 chronic kidney disease      History of Cataract Extraction      Hx of CABG      H/O coronary angiogram      S/P coronary artery stent placement  1/6/09      S/P placement of cardiac pacemaker          FAMILY HISTORY:  No pertinent family history in first degree relatives        Social History:  Lives with family  Ambulates with walker  Denies tobacco, EtOH, or illicit substance use (23 Dec 2023 22:51)      RADIOLOGY:  [ ] Reviewed and interpreted by me    PULMONARY FUNCTION TESTS:    EKG: CHIEF COMPLAINT: Patient is a 89y old  Male who presents with a chief complaint of COVID19, Chest pain (16 Mar 2024 07:19)    Interval Events: +Obtunded overnight. Pt today appears much more awake and alert, +agitated; attempting to pull out line/trach. VSS, and medications reviewed.     REVIEW OF SYSTEMS:  See above  [x] Unable to assess ROS because poor phonation    Mode: CPAP with PS, FiO2: 30, PEEP: 5, PS: 12, MAP: 11, PIP: 17  Arterial Blood Gas:  03-15 @ 21:56  7.44/50/130/34/98.9/8.8  ABG lactate: --    OBJECTIVE:  ICU Vital Signs Last 24 Hrs  T(C): 36.7 (17 Mar 2024 04:00), Max: 37.3 (16 Mar 2024 13:12)  T(F): 98.1 (17 Mar 2024 04:00), Max: 99.1 (16 Mar 2024 13:12)  HR: 86 (17 Mar 2024 06:25) (75 - 88)  BP: 115/40 (17 Mar 2024 04:00) (108/42 - 140/53)  BP(mean): --  ABP: --  ABP(mean): --  RR: 22 (17 Mar 2024 04:00) (19 - 22)  SpO2: 96% (17 Mar 2024 06:25) (96% - 100%)    O2 Parameters below as of 17 Mar 2024 06:25  Patient On (Oxygen Delivery Method): ventilator    Mode: CPAP with PS, FiO2: 30, PEEP: 5, PS: 12, MAP: 11, PIP: 17    03-16 @ 07:01  -  03-17 @ 07:00  --------------------------------------------------------  IN: 1300 mL / OUT: 530 mL / NET: 770 mL    CAPILLARY BLOOD GLUCOSE    POCT Blood Glucose.: 157 mg/dL (17 Mar 2024 05:16)    HOSPITAL MEDICATIONS:  MEDICATIONS  (STANDING):  albuterol/ipratropium for Nebulization 3 milliLiter(s) Nebulizer every 12 hours  artificial  tears Solution 1 Drop(s) Left EYE four times a day  aspirin  chewable 81 milliGRAM(s) Enteral Tube daily  carvedilol 6.25 milliGRAM(s) Oral every 12 hours  chlorhexidine 0.12% Liquid 15 milliLiter(s) Oral Mucosa every 12 hours  chlorhexidine 2% Cloths 1 Application(s) Topical daily  dextrose 5%. 1000 milliLiter(s) (100 mL/Hr) IV Continuous <Continuous>  dextrose 5%. 1000 milliLiter(s) (50 mL/Hr) IV Continuous <Continuous>  dextrose 50% Injectable 25 Gram(s) IV Push once  dextrose 50% Injectable 25 Gram(s) IV Push once  dextrose 50% Injectable 12.5 Gram(s) IV Push once  doxazosin 2 milliGRAM(s) Oral at bedtime  escitalopram Solution 10 milliGRAM(s) Oral daily  furosemide    Tablet 20 milliGRAM(s) Oral daily  glucagon  Injectable 1 milliGRAM(s) IntraMuscular once  heparin   Injectable 5000 Unit(s) SubCutaneous every 8 hours  hydrALAZINE 25 milliGRAM(s) Oral every 8 hours  insulin glargine Injectable (LANTUS) 14 Unit(s) SubCutaneous <User Schedule>  insulin lispro (ADMELOG) corrective regimen sliding scale   SubCutaneous every 6 hours  levETIRAcetam  Solution 250 milliGRAM(s) Oral two times a day  melatonin 6 milliGRAM(s) Oral at bedtime  pantoprazole   Suspension 40 milliGRAM(s) Oral every 12 hours  petrolatum Ophthalmic Ointment 1 Application(s) Left EYE at bedtime  polyethylene glycol 3350 17 Gram(s) Oral daily  senna Syrup 10 milliLiter(s) Oral at bedtime  sucralfate suspension 1 Gram(s) Enteral Tube two times a day  ticagrelor 60 milliGRAM(s) Oral every 12 hours  valproic  acid Syrup 125 milliGRAM(s) Oral every 8 hours    MEDICATIONS  (PRN):  acetaminophen   Oral Liquid .. 650 milliGRAM(s) Oral every 6 hours PRN Temp greater or equal to 38C (100.4F), Mild Pain (1 - 3), Moderate Pain (4 - 6)  dextrose Oral Gel 15 Gram(s) Oral once PRN Blood Glucose LESS THAN 70 milliGRAM(s)/deciliter    PHYSICAL EXAM:  General: Laying in bed comfortably, No acute resp distress.  Neck: (+) Trach tube noted, site c/d/i.  HEART: +s1, s2  LUNGS: +coarse breath sounds. No resp distress.   GI: +BS, soft, nt/nd, no peritoneal signs noted, +PEG, area c/d/i  Extremities: 1-2+ b/l pitting LE edema extending to below knee region.   Skin: as per RN assessment sheet   NEURO: Awake, eyes open. PERRL. Nonverbal. Not following commands. Unresponsive.   Psych: +agitated, attempting to pull out line/trach    LABS:                        8.8    9.69  )-----------( 306      ( 17 Mar 2024 05:48 )             28.1     03-17    139  |  99  |  40<H>  ----------------------------<  130<H>  4.3   |  29  |  1.24    Ca    8.6      17 Mar 2024 05:48  Phos  2.6     03-17  Mg     2.20     03-17    TPro  6.8  /  Alb  2.7<L>  /  TBili  0.3  /  DBili  x   /  AST  18  /  ALT  12  /  AlkPhos  125<H>  03-17    PT/INR - ( 16 Mar 2024 18:21 )   PT: 11.9 sec;   INR: 1.06 ratio         PTT - ( 16 Mar 2024 18:21 )  PTT:30.4 sec  Urinalysis Basic - ( 17 Mar 2024 05:48 )    Color: x / Appearance: x / SG: x / pH: x  Gluc: 130 mg/dL / Ketone: x  / Bili: x / Urobili: x   Blood: x / Protein: x / Nitrite: x   Leuk Esterase: x / RBC: x / WBC x   Sq Epi: x / Non Sq Epi: x / Bacteria: x      Arterial Blood Gas:  03-15 @ 21:56  7.44/50/130/34/98.9/8.8  ABG lactate: --    Venous Blood Gas:  03-17 @ 05:48  7.40/55/32/34/49.7  VBG Lactate: 2.7  Venous Blood Gas:  03-16 @ 18:05  7.45/51/49/35/80.9  VBG Lactate: 1.8    PAST MEDICAL & SURGICAL HISTORY:  Hyperlipemia    Hypertension    Coronary Artery Disease    Diabetes Mellitus Type II    Stented Coronary Artery  total 5 stents, last stent 5/2019    Neuropathy    Myocardial infarction    Stroke  mild left facial numbness   no other residuals verbalized    Myoclonic jerking    Stage 3 chronic kidney disease    History of Cataract Extraction    Hx of CABG    H/O coronary angiogram    S/P coronary artery stent placement  1/6/09    S/P placement of cardiac pacemaker    FAMILY HISTORY:  No pertinent family history in first degree relatives    Social History:  Lives with family  Ambulates with walker  Denies tobacco, EtOH, or illicit substance use (23 Dec 2023 22:51)    RADIOLOGY:  [ ] Reviewed and interpreted by me    PULMONARY FUNCTION TESTS:    EKG:

## 2024-03-17 NOTE — PROGRESS NOTE ADULT - NS ATTEND AMEND GEN_ALL_CORE FT
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     Pt with persistent encephalopathy with intermittent episodes of confusion and disorientation with trach/ling pulling in the setting of prolonged hospitalization with critical illness but now showing s/s improvement. CT Head (1/31) without acute pathologic anatomy. EEG (2/6) without epileptiform discharges. On ASA/Brilinta from prior hx CVAs (without known neurologic deficits) with known hx severe b/l internal carotid narrowing without occlusions. On home SSRI, but pt continued to have periods of agitation with line/trach pulling unable to receive Seroquel 2/2 prolonged QTc. Limiting benzos, prn changed to zyprexa but it did not help. Haldol tried but there was concern for increased EPS type sx. Switched again to Ativan prn with appropriate clinical response but followed by lethargy. Holding current depakote dose 2/2 increased ammonia levels, now improved after lactulose x1. Decreased keppra simultaneously, as only in place as home medication for tremors/jerking (pt does not have any hx of seizure activity). Pt further remains physically weak and debilitated in the setting of prolonged critical illness with polyneuropathy. Will discuss with PT for possible OOB to chair if more awake.  CL Team consulted and recs appreciated.     Pt with cardiac hz as above now hemodynamically stable off vasopressors, on DMT as tolerated. TTE 2/24 with new biventricular failure with EF 38% and moderate MR/AS, repeat TTE 3/4 showing improved LVSF. Will c/w Lasix + carvedilol + hydralazine + statin, holding isordil for now with uptitration of current medications first. Will c/w ASA/Brilinta (GEMMA 5/22). Bedside POCUS performed 3/15, pt with normal lung aeration pattern without any PLEFs or evidence of RA/RV overload. Will hold diuresis for now with further daily bedside evaluations for volume status.  Cardiology following, recs appreciated.    Pt with acute hypoxemic respiratory failure 2/2 COVID19 +/- flash pulmonary edema with severe sepsis further c/b acute cholecystitis requiring ETT intubation/MV with failure to wean from ventilator s/p tracheostomy placement (2/13). Will c/w SBT trials as tolerated, pt doing well on 12/5 x3 days. Will continue PSV overnight if tolerates. Airway clearance therapy in place. Trach care and suctioning as per RCU team. Oral hygiene and aspiration precautions in place. Hemoptysis earlier in admission now resolved.     Pt with oropharyngeal dysphagia s/p PEG placement (GI, 2/20) now tolerating feeds at goal rate. Hospital course c/b acute cholecystitis s/p percutaneous asa (1/26), will remain until cholecystectomy. Abx course completed. SMA calcification initially found on CTA now s/p laparoscopy (12/24) with SMA angiogram and stent placement (12/25.) Will c/w DAPT. UGIB 2/2 esophagitis with bleeding Dieulafoy lesion s/p EGD (1/2) with clips. Will c/w PPI and Carafate. Hypernatremia resolved on free water, now held 2/2 concern for fluid overload. DMII well controlled on basal/bolus insulin with ISS and BGFS monitoring.     Pt with hospital course c/b COVID with superimposed PNA s/p tx followed by ESBL Klebsiella PNA s/p Zosyn followed by broadening to Ertapenem through 3/6. Now off Abx. Will CTM conservatively with low threshold to restart abx.     Dispo pending medical optimization. Pt full code. DVT ppx HSQ. Plan for dispo to LTCF with ventilator capabilities. Will continue to update pt and family throughout hospitalization.

## 2024-03-17 NOTE — PROGRESS NOTE ADULT - ASSESSMENT
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 Course ultimately complicated by progressive WOB and O2 demand and patient intubated and transferred to MICU (1/22). Course further complicated by acute cholecystitis and s/p Perc Lynsey with IR on (1/26), HFrEF 38, pulmonary edema, superimposed PNA, failed extubation failed and prolonged vent time and s/p trach (2/13). Patient transferred to RCU (2/16) and s/p PEG (2/20). Course complicated by volume overload and diuresis and GDMT continued and HFrEF 45 with improvement    NEUROLOGY   # Encephalopathy   - Hx of CVA with no residual deficits per family, however per family worsening confusion at home over the past 6 months (becoming disoriented to time) but prior to admission has been more confused, talking about seeing people that are not present.  - CTH (1/31) with no evidence of acute infarct  - Continue on ASA and Brilinta  - Holding Neurontin, Memantine and Oxycodone in setting of somnolence    # ICA stenosis   - CTA NECK (1/31) with moderate to severe narrowing left internal carotid at the origin. Severe narrowing right internal carotid by a tandem lesion 1.5 cm above the bifurcation with a narrowed internal carotid beyond the lesion. Extensive calcified plaque both cavernous and supraclinoid carotid arteries with narrowing but no occlusion. Mild narrowing proximal right M1 segment. Moderate narrowing both vertebral V4 segments. No perfusion abnormality at the Tmax 6 second threshold, but moderate hypoperfusion in the right MCA territory at the 4 second threshold.   - Continue on ASA and Brilinta    # Myoclonic jerks   - Hx of myoclonic jerks post CVAs   - EEG (1/18-2/6) negative for seizures  - Continue on Keppra and per neuro/ psych wishes to wean, family in agreement   - Currently down titrating Keppra 500 mg BID, now Keppra 250 mg BID 3/13 - )    # Depression/ Insomnia  - Continue on Lexapro per home medication   - Course complicated by agitation and pulling on tubes   - Home Seroquel discontinued as patient now on Valproic acid syrup   - S/p Ativan 0.5mg PO Q6H PRN and Haldol 0.25 mg Q6H for severe agitation, now IM Zypexa 1.25 Q8H PRN for agitation    - Supportive care     # Agitation in setting of delirium / underlying vascular dementia   - 3/11 Psychiatry consulted, started Depakote 125 mg PO BID, increased to 250 mg PO BID 3/12  and additional 125 mg PO QD added at 1300 on 3/14   - monitor platelets, LFTs while on Depakote   - C/w VPA TID (250mg QAM, 125mg afternoon, 375mg QHS)  - Better response to Ativan than Zyprexa so will c/w Ativan 0.5mg Q6H PRN    CARDIOLOGY   # Vasoplegic vs septic shock  # Hx of HTN   - Weaned off pressors in ICU and now with mild hypertension   - Continue on coreg and hydral as below   - Strict BP control given moderate AS  - Increased vascular congestion on CXR (3/9) so will give additional Lasix 20mg PO x1 (3/10)    # AFIB RVR   # Bifascicular Block with Medtronic PPM   - AFIB RVR episodes and PPM interrogated (2/20) by EP with similar episodes  - Previous admission in 6/2022 with cardiogenic syncope second to bifasicular block, however now with PPM and AV myron blockers ok.   - Continue on lopressor 12.5mg BID however replaced with coreg second to HFrEF   - AC discussed at length however with recent GIB will hold for now     # HFrEF 38 likely stress induced?   # Mild to moderate AS   - ECHO (12/2023) with EF 62 with normal LVSF and mild to moderate AS   - ECHO (2/2024) with EF 38, moderate LVSD with global LV hypokinesis, mildly reduced RVSF (TAPSE 1.3), mild to moderate MR, mild AR, and moderate AS.   - RPT ECHO (3/4) with EF 45 and mild to moderate LVSD   - Continue on lasix 20 QD, coreg 6.25 and hydralazine 50 (increased 3/4)   - Hold isordil and up titrate above medications first     # CAD with CABG   - CATH (5/2022) with dLAD 70, SVG graft to OM1 with 90 in stent stenosis s/p PCI and GEMMA placement, and LIMA graft to mLAD with no disease.   - Continue on ASA and brilinta    # Prolonged QTC (Resolved)  - ECG (3/2) with QTC 616ms (corrected using bazzet 490ms)   - ECG (3/3) with QTC 634ms (corrected using bazzet 490ms)   - ECG (3/11) with QTC 490ms   - EKG (3/15) QTc 498ms  - Monitor off/ avoid QTC prolonging agents for now    RESPIRATORY   # Acute respiratory failure second to SARS-COV-2 vs pulm edema vs PNA  - Presented with COVID complicated by sepsis second to acute lynsey and worsening respiratory failure second to pulmonary edema requiring intubation.   - Prolonged intubation and s/p tracheostomy (2/13)   - CT CHEST 1/16 with new small bilateral pleural effusions/ atelectasis/ patchy bilateral ground-glass opacities are consistent with pulmonary edema.  - Completed ABX and COVID regimen as below   - Continue on vent and initially tolerating some SBT 12/5  - Continue on nebs and hold further HTS given intermittent hemoptysis  - Continue on diuresis as above    # Mild hemoptysis likely from suction trauma   - s/p bronch (2/18) with no active bleed  - Hemoptysis improved with TXA and holding further HTS as above   - Tiny hemoptysis second to suction trauma imporving  - Careful with suctioning   - Recurrent hemoptysis (3/9) so ordered for TXA nebs again however hemoptysis appeared self limited and stopped before nebs even given    HEENT   # L eye subconjunctival hemorrhage   - Continue on artifical tears and Lacrilube   - Optho eval appreciated     GI  # Nutrition/ Dysphagia   - PEG placed by GI on 2/20 and tube feeds continued.   - Loose stools and Banatrol continued     # UGIB   - EGD (1/2) with esophagitis and bleeding Dieulafoy's lesion s/p clips.   - Continue on PPI and carafate     # SMA calcification   - CTA demonstrating mesenteric fat stranding associated with ascending/transverse colon  - Diagnostic laparoscopy (12/24) with small bowel and visible colon viable, some inflammation of omentum in RUQ  - SMA Angiogram and stent placed (12/25).   - Continue on ASA and Brilinta     # Acute Cholecystitis   - CT (12/26) with distended GB with cholelithiasis and sludge   - HIDA (12/29) POSITIVE for acute cholecystitis   - Case discussed with IR at length and s/p PERC LYNSEY (1/26)   - Completed zosyn course (12/24-12/31)   - PERC LYNSEY tube to stay in until surgical candidate for cholecystectomy to prevent recurrence     / RENAL   # CKD   - CRE at baseline   - Monitor renal function and UOP   - Aranesp SQ x1 (3/8)    # Hypernatremia (resolved)   - s/p  Q4H  - Monitor closely with diuresis as above   - Stop FWF (3/15) given c/f worsening vol overload       INFECTIOUS DISEASE   # COVID with superimposed PNA   # ESBL Kleb PNA   - COVID POSITIVE (12/23) and completed remdesivir x 1 dose and paxlovid course.   - WOB worsened as above requiring intubation and cultures negative. Zosyn completed empirically however hypothermic (2/11) and BCx, UA, RVP and BAL negative.   - Completed additional zosyn (2/12-2/19) empirically   - Fever spike and thick secretions and SCX (2/26) with ESBL klebs.   - s/p Zosyn (2/27 - 2/29) but resistant and completed Erta (2/29 - 3/6)     HEME  # AOCD   - Monitor HH   - DVT PPX with HSQ     ENDOCRINE   # DM2 A1C 9.3   - Continue on Lantus 14 and ISS     SKIN/ TUBES   - Trach (2/13)   - PEG (2/20)   - PERC LYNSEY (1/26 - )     ETHICS   - FULL CODE     DISPO - vent facility and family aware. SW with accepting facility however pending available bed. 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 Course ultimately complicated by progressive WOB and O2 demand and patient intubated and transferred to MICU (1/22). Course further complicated by acute cholecystitis and s/p Perc Lynsey with IR on (1/26), HFrEF 38, pulmonary edema, superimposed PNA, failed extubation failed and prolonged vent time and s/p trach (2/13). Patient transferred to RCU (2/16) and s/p PEG (2/20). Course complicated by volume overload and diuresis and GDMT continued and HFrEF 45 with improvement    NEUROLOGY   # Encephalopathy   - Hx of CVA with no residual deficits per family, however per family worsening confusion at home over the past 6 months (becoming disoriented to time) but prior to admission has been more confused, talking about seeing people that are not present.  - CTH (1/31) with no evidence of acute infarct  - Continue on ASA and Brilinta  - Holding Neurontin, Memantine and Oxycodone in setting of somnolence  3/17: +obtuneded overnight, CT Head: Mild chronic microvascular changes without evidence of an acute transcortical infarction or hemorrhage.  - VBG and Ammonia level grossly unremarkable  - Much awake and alert in the afternoon, agitated, will consider Seroquel/low dose Ativan if needed     # ICA stenosis   - CTA NECK (1/31) with moderate to severe narrowing left internal carotid at the origin. Severe narrowing right internal carotid by a tandem lesion 1.5 cm above the bifurcation with a narrowed internal carotid beyond the lesion. Extensive calcified plaque both cavernous and supraclinoid carotid arteries with narrowing but no occlusion. Mild narrowing proximal right M1 segment. Moderate narrowing both vertebral V4 segments. No perfusion abnormality at the Tmax 6 second threshold, but moderate hypoperfusion in the right MCA territory at the 4 second threshold.   - Continue on ASA and Brilinta    # Myoclonic jerks   - Hx of myoclonic jerks post CVAs   - EEG (1/18-2/6) negative for seizures  - Continue on Keppra and per neuro/ psych wishes to wean, family in agreement   - Currently down titrating Keppra 500 mg BID, now Keppra 250 mg BID 3/13 - )    # Depression/ Insomnia  - Continue on Lexapro per home medication   - Course complicated by agitation and pulling on tubes   - Home Seroquel discontinued as patient now on Valproic acid syrup   - S/p Ativan 0.5mg PO Q6H PRN and Haldol 0.25 mg Q6H for severe agitation, then s/p IM Zypexa 1.25 Q8H PRN for agitation    - Supportive care   - s/p Seroquel 12.5 x 1 w no significant improvement, still agitated, then Ativan 0.25mg x1   - Will monitor closely    # Agitation in setting of delirium / underlying vascular dementia   - BH following, c/w Depakene 125mg q8hr  - monitor platelets, LFTs while on Depakote   - C/w VPA TID (250mg QAM, 125mg afternoon, 375mg QHS)  - Recently, Pt had responded much better to Ativan than Zyprexa for agitation, however became obtunded, so Ativan d/c Ativan 0.5mg Q6H PRN    CARDIOLOGY   # Vasoplegic vs septic shock  # Hx of HTN   - Weaned off pressors in ICU and now with mild hypertension   - Continue on coreg and hydral as below   - Strict BP control given moderate AS  - Increased vascular congestion on CXR (3/9) so will give additional Lasix 20mg PO x1 (3/10)    # AFIB RVR   # Bifascicular Block with [employer] PPM   - AFIB RVR episodes and PPM interrogated (2/20) by EP with similar episodes  - Previous admission in 6/2022 with cardiogenic syncope second to bifasicular block, however now with PPM and AV myron blockers ok.   - Continue on lopressor 12.5mg BID however replaced with coreg second to HFrEF   - AC discussed at length however with recent GIB will hold for now     # HFrEF 38 likely stress induced?   # Mild to moderate AS   - ECHO (12/2023) with EF 62 with normal LVSF and mild to moderate AS   - ECHO (2/2024) with EF 38, moderate LVSD with global LV hypokinesis, mildly reduced RVSF (TAPSE 1.3), mild to moderate MR, mild AR, and moderate AS.   - RPT ECHO (3/4) with EF 45 and mild to moderate LVSD   - Continue on Lasix 20 QD, coreg 6.25 and hydralazine 50 (increased 3/4)   - Hold isordil and up titrate above medications first     # CAD with CABG   - CATH (5/2022) with dLAD 70, SVG graft to OM1 with 90 in stent stenosis s/p PCI and GEMMA placement, and LIMA graft to mLAD with no disease.   - Continue on ASA and Brilinta    # Prolonged QTC (Resolved)  - ECG (3/2) with QTC 616ms (corrected using bazzet 490ms)   - ECG (3/3) with QTC 634ms (corrected using bazzet 490ms)   - ECG (3/11) with QTC 490ms   - EKG (3/15) QTc 498ms  - Monitor off/ avoid QTC prolonging agents for now    RESPIRATORY   # Acute respiratory failure second to SARS-COV-2 vs pulm edema vs PNA  - Presented with COVID complicated by sepsis second to acute lynsey and worsening respiratory failure second to pulmonary edema requiring intubation.   - Prolonged intubation and s/p tracheostomy (2/13)   - CT CHEST 1/16 with new small bilateral pleural effusions/ atelectasis/ patchy bilateral ground-glass opacities are consistent with pulmonary edema.  - Completed ABX and COVID regimen as below   - Continue on vent and allow SBT as tolerated   - Continue on nebs and hold further HTS given intermittent hemoptysis  - Continue on diuresis as above    # Mild hemoptysis likely from suction trauma   - s/p bronch (2/18) with no active bleed  - Hemoptysis improved with TXA and holding further HTS as above   - Tiny hemoptysis second to suction trauma imporving  - Careful with suctioning   - Recurrent hemoptysis (3/9) so ordered for TXA nebs again however hemoptysis appeared self limited and stopped before nebs even given    HEENT   # L eye subconjunctival hemorrhage   - Continue on artifical tears and Lacrilube   - Optho eval appreciated     GI  # Nutrition/ Dysphagia   - PEG placed by GI on 2/20 and tube feeds continued.   - Loose stools and Banatrol continued     # UGIB   - EGD (1/2) with esophagitis and bleeding Dieulafoy's lesion s/p clips.   - Continue on PPI and carafate     # SMA calcification   - CTA demonstrating mesenteric fat stranding associated with ascending/transverse colon  - Diagnostic laparoscopy (12/24) with small bowel and visible colon viable, some inflammation of omentum in RUQ  - SMA Angiogram and stent placed (12/25).   - Continue on ASA and Brilinta     # Acute Cholecystitis   - CT (12/26) with distended GB with cholelithiasis and sludge   - HIDA (12/29) POSITIVE for acute cholecystitis   - Case discussed with IR at length and s/p PERC LYNSEY (1/26)   - Completed zosyn course (12/24-12/31)   - PERC LYNSEY tube to stay in until surgical candidate for cholecystectomy to prevent recurrence     / RENAL   # CKD   - CRE at baseline   - Monitor renal function and UOP   - Aranesp SQ x1 (3/8)    # Hypernatremia (resolved)   - s/p  Q4H  - Monitor closely with diuresis as above   - Stop FWF (3/15) given c/f worsening vol overload     INFECTIOUS DISEASE   # COVID with superimposed PNA   # ESBL Kleb PNA   - COVID POSITIVE (12/23) and completed remdesivir x 1 dose and paxlovid course.   - WOB worsened as above requiring intubation and cultures negative. Zosyn completed empirically however hypothermic (2/11) and BCx, UA, RVP and BAL negative.   - Completed additional zosyn (2/12-2/19) empirically   - Fever spike and thick secretions and SCX (2/26) with ESBL klebs.   - s/p Zosyn (2/27 - 2/29) but resistant and completed Erta (2/29 - 3/6)     HEME  # AOCD   - Monitor HH   - DVT PPX with HSQ     ENDOCRINE   # DM2 A1C 9.3   - Continue on Lantus 14 and ISS     SKIN/ TUBES   - Trach (2/13)   - PEG (2/20)   - PERC LYNSEY (1/26 - )     ETHICS   - FULL CODE     DISPO - vent facility and family aware. SW with accepting facility however pending available bed.

## 2024-03-18 LAB
ANION GAP SERPL CALC-SCNC: 13 MMOL/L — SIGNIFICANT CHANGE UP (ref 7–14)
BUN SERPL-MCNC: 40 MG/DL — HIGH (ref 7–23)
CALCIUM SERPL-MCNC: 8.8 MG/DL — SIGNIFICANT CHANGE UP (ref 8.4–10.5)
CHLORIDE SERPL-SCNC: 102 MMOL/L — SIGNIFICANT CHANGE UP (ref 98–107)
CO2 SERPL-SCNC: 29 MMOL/L — SIGNIFICANT CHANGE UP (ref 22–31)
CREAT SERPL-MCNC: 1.25 MG/DL — SIGNIFICANT CHANGE UP (ref 0.5–1.3)
EGFR: 55 ML/MIN/1.73M2 — LOW
GLUCOSE BLDC GLUCOMTR-MCNC: 126 MG/DL — HIGH (ref 70–99)
GLUCOSE BLDC GLUCOMTR-MCNC: 146 MG/DL — HIGH (ref 70–99)
GLUCOSE BLDC GLUCOMTR-MCNC: 150 MG/DL — HIGH (ref 70–99)
GLUCOSE SERPL-MCNC: 105 MG/DL — HIGH (ref 70–99)
HCT VFR BLD CALC: 28 % — LOW (ref 39–50)
HGB BLD-MCNC: 8.7 G/DL — LOW (ref 13–17)
MAGNESIUM SERPL-MCNC: 2.3 MG/DL — SIGNIFICANT CHANGE UP (ref 1.6–2.6)
MCHC RBC-ENTMCNC: 29 PG — SIGNIFICANT CHANGE UP (ref 27–34)
MCHC RBC-ENTMCNC: 31.1 GM/DL — LOW (ref 32–36)
MCV RBC AUTO: 93.3 FL — SIGNIFICANT CHANGE UP (ref 80–100)
NRBC # BLD: 0 /100 WBCS — SIGNIFICANT CHANGE UP (ref 0–0)
NRBC # FLD: 0 K/UL — SIGNIFICANT CHANGE UP (ref 0–0)
PHOSPHATE SERPL-MCNC: 3.2 MG/DL — SIGNIFICANT CHANGE UP (ref 2.5–4.5)
PLATELET # BLD AUTO: 278 K/UL — SIGNIFICANT CHANGE UP (ref 150–400)
POTASSIUM SERPL-MCNC: 4.5 MMOL/L — SIGNIFICANT CHANGE UP (ref 3.5–5.3)
POTASSIUM SERPL-SCNC: 4.5 MMOL/L — SIGNIFICANT CHANGE UP (ref 3.5–5.3)
RBC # BLD: 3 M/UL — LOW (ref 4.2–5.8)
RBC # FLD: 16.3 % — HIGH (ref 10.3–14.5)
SODIUM SERPL-SCNC: 144 MMOL/L — SIGNIFICANT CHANGE UP (ref 135–145)
VALPROATE SERPL-MCNC: 27.3 UG/ML — LOW (ref 50–100)
WBC # BLD: 5.22 K/UL — SIGNIFICANT CHANGE UP (ref 3.8–10.5)
WBC # FLD AUTO: 5.22 K/UL — SIGNIFICANT CHANGE UP (ref 3.8–10.5)

## 2024-03-18 PROCEDURE — 99233 SBSQ HOSP IP/OBS HIGH 50: CPT | Mod: FS

## 2024-03-18 PROCEDURE — 93010 ELECTROCARDIOGRAM REPORT: CPT

## 2024-03-18 PROCEDURE — 99232 SBSQ HOSP IP/OBS MODERATE 35: CPT

## 2024-03-18 RX ORDER — DARBEPOETIN ALFA IN POLYSORBAT 200MCG/0.4
60 PEN INJECTOR (ML) SUBCUTANEOUS ONCE
Refills: 0 | Status: COMPLETED | OUTPATIENT
Start: 2024-03-18 | End: 2024-03-19

## 2024-03-18 RX ORDER — QUETIAPINE FUMARATE 200 MG/1
50 TABLET, FILM COATED ORAL AT BEDTIME
Refills: 0 | Status: DISCONTINUED | OUTPATIENT
Start: 2024-03-18 | End: 2024-03-20

## 2024-03-18 RX ORDER — QUETIAPINE FUMARATE 200 MG/1
25 TABLET, FILM COATED ORAL
Refills: 0 | Status: DISCONTINUED | OUTPATIENT
Start: 2024-03-18 | End: 2024-03-25

## 2024-03-18 RX ADMIN — CHLORHEXIDINE GLUCONATE 15 MILLILITER(S): 213 SOLUTION TOPICAL at 17:11

## 2024-03-18 RX ADMIN — CHLORHEXIDINE GLUCONATE 1 APPLICATION(S): 213 SOLUTION TOPICAL at 11:41

## 2024-03-18 RX ADMIN — QUETIAPINE FUMARATE 50 MILLIGRAM(S): 200 TABLET, FILM COATED ORAL at 21:40

## 2024-03-18 RX ADMIN — CARVEDILOL PHOSPHATE 6.25 MILLIGRAM(S): 80 CAPSULE, EXTENDED RELEASE ORAL at 17:09

## 2024-03-18 RX ADMIN — Medication 2 MILLIGRAM(S): at 21:37

## 2024-03-18 RX ADMIN — Medication 25 MILLIGRAM(S): at 06:54

## 2024-03-18 RX ADMIN — Medication 3 MILLILITER(S): at 09:19

## 2024-03-18 RX ADMIN — HEPARIN SODIUM 5000 UNIT(S): 5000 INJECTION INTRAVENOUS; SUBCUTANEOUS at 21:34

## 2024-03-18 RX ADMIN — TICAGRELOR 60 MILLIGRAM(S): 90 TABLET ORAL at 17:10

## 2024-03-18 RX ADMIN — PANTOPRAZOLE SODIUM 40 MILLIGRAM(S): 20 TABLET, DELAYED RELEASE ORAL at 17:10

## 2024-03-18 RX ADMIN — POLYETHYLENE GLYCOL 3350 17 GRAM(S): 17 POWDER, FOR SOLUTION ORAL at 11:38

## 2024-03-18 RX ADMIN — Medication 25 MILLIGRAM(S): at 13:09

## 2024-03-18 RX ADMIN — Medication 125 MILLIGRAM(S): at 21:36

## 2024-03-18 RX ADMIN — PANTOPRAZOLE SODIUM 40 MILLIGRAM(S): 20 TABLET, DELAYED RELEASE ORAL at 06:49

## 2024-03-18 RX ADMIN — Medication 0.25 MILLIGRAM(S): at 07:56

## 2024-03-18 RX ADMIN — Medication 1 APPLICATION(S): at 21:36

## 2024-03-18 RX ADMIN — Medication 1 DROP(S): at 06:52

## 2024-03-18 RX ADMIN — Medication 1 DROP(S): at 00:02

## 2024-03-18 RX ADMIN — HEPARIN SODIUM 5000 UNIT(S): 5000 INJECTION INTRAVENOUS; SUBCUTANEOUS at 06:50

## 2024-03-18 RX ADMIN — LEVETIRACETAM 250 MILLIGRAM(S): 250 TABLET, FILM COATED ORAL at 17:11

## 2024-03-18 RX ADMIN — Medication 125 MILLIGRAM(S): at 13:09

## 2024-03-18 RX ADMIN — LEVETIRACETAM 250 MILLIGRAM(S): 250 TABLET, FILM COATED ORAL at 06:48

## 2024-03-18 RX ADMIN — Medication 1 GRAM(S): at 17:10

## 2024-03-18 RX ADMIN — Medication 6 MILLIGRAM(S): at 21:37

## 2024-03-18 RX ADMIN — TICAGRELOR 60 MILLIGRAM(S): 90 TABLET ORAL at 06:50

## 2024-03-18 RX ADMIN — Medication 81 MILLIGRAM(S): at 11:39

## 2024-03-18 RX ADMIN — Medication 125 MILLIGRAM(S): at 06:49

## 2024-03-18 RX ADMIN — Medication 1 GRAM(S): at 06:49

## 2024-03-18 RX ADMIN — ESCITALOPRAM OXALATE 10 MILLIGRAM(S): 10 TABLET, FILM COATED ORAL at 11:37

## 2024-03-18 RX ADMIN — INSULIN GLARGINE 14 UNIT(S): 100 INJECTION, SOLUTION SUBCUTANEOUS at 11:57

## 2024-03-18 RX ADMIN — Medication 1 DROP(S): at 23:33

## 2024-03-18 RX ADMIN — HEPARIN SODIUM 5000 UNIT(S): 5000 INJECTION INTRAVENOUS; SUBCUTANEOUS at 13:10

## 2024-03-18 RX ADMIN — CARVEDILOL PHOSPHATE 6.25 MILLIGRAM(S): 80 CAPSULE, EXTENDED RELEASE ORAL at 05:51

## 2024-03-18 RX ADMIN — Medication 20 MILLIGRAM(S): at 06:50

## 2024-03-18 RX ADMIN — Medication 1 DROP(S): at 11:56

## 2024-03-18 RX ADMIN — Medication 25 MILLIGRAM(S): at 21:36

## 2024-03-18 RX ADMIN — Medication 3 MILLILITER(S): at 20:06

## 2024-03-18 RX ADMIN — CHLORHEXIDINE GLUCONATE 15 MILLILITER(S): 213 SOLUTION TOPICAL at 06:51

## 2024-03-18 RX ADMIN — SENNA PLUS 10 MILLILITER(S): 8.6 TABLET ORAL at 21:38

## 2024-03-18 RX ADMIN — Medication 1 DROP(S): at 17:10

## 2024-03-18 NOTE — PROGRESS NOTE ADULT - SUBJECTIVE AND OBJECTIVE BOX
Aashish Boyer MD  Interventional Cardiology / Advance Heart Failure and Cardiac Transplant Specialist  [place] Office : [phone] [place] [id number]  Tel:   [place] Office : [phone] [facility] [address]  Tel: [phone]      Subjective/Overnight events: Pt is lying in bed in RCU  	  MEDICATIONS:  aspirin  chewable 81 milliGRAM(s) Enteral Tube daily  carvedilol 6.25 milliGRAM(s) Oral every 12 hours  doxazosin 2 milliGRAM(s) Oral at bedtime  furosemide    Tablet 20 milliGRAM(s) Oral daily  heparin   Injectable 5000 Unit(s) SubCutaneous every 8 hours  hydrALAZINE 25 milliGRAM(s) Oral every 8 hours  ticagrelor 60 milliGRAM(s) Oral every 12 hours      albuterol/ipratropium for Nebulization 3 milliLiter(s) Nebulizer every 12 hours    acetaminophen   Oral Liquid .. 650 milliGRAM(s) Oral every 6 hours PRN  escitalopram Solution 10 milliGRAM(s) Oral daily  levETIRAcetam  Solution 250 milliGRAM(s) Oral two times a day  melatonin 6 milliGRAM(s) Oral at bedtime  valproic  acid Syrup 125 milliGRAM(s) Oral every 8 hours    pantoprazole   Suspension 40 milliGRAM(s) Oral every 12 hours  polyethylene glycol 3350 17 Gram(s) Oral daily  senna Syrup 10 milliLiter(s) Oral at bedtime  sucralfate suspension 1 Gram(s) Enteral Tube two times a day    dextrose 50% Injectable 25 Gram(s) IV Push once  dextrose 50% Injectable 12.5 Gram(s) IV Push once  dextrose 50% Injectable 25 Gram(s) IV Push once  dextrose Oral Gel 15 Gram(s) Oral once PRN  glucagon  Injectable 1 milliGRAM(s) IntraMuscular once  insulin glargine Injectable (LANTUS) 14 Unit(s) SubCutaneous <User Schedule>  insulin lispro (ADMELOG) corrective regimen sliding scale   SubCutaneous every 6 hours    artificial  tears Solution 1 Drop(s) Left EYE four times a day  chlorhexidine 0.12% Liquid 15 milliLiter(s) Oral Mucosa every 12 hours  chlorhexidine 2% Cloths 1 Application(s) Topical daily  dextrose 5%. 1000 milliLiter(s) IV Continuous <Continuous>  dextrose 5%. 1000 milliLiter(s) IV Continuous <Continuous>  petrolatum Ophthalmic Ointment 1 Application(s) Left EYE at bedtime      PAST MEDICAL/SURGICAL HISTORY  PAST MEDICAL & SURGICAL HISTORY:  Hyperlipemia      Hypertension      Coronary Artery Disease      Diabetes Mellitus Type II      Stented Coronary Artery  total 5 stents, last stent 5/2019      Neuropathy      Myocardial infarction      Stroke  mild left facial numbness   no other residuals verbalized      Myoclonic jerking      Stage 3 chronic kidney disease      History of Cataract Extraction      Hx of CABG      H/O coronary angiogram      S/P coronary artery stent placement  1/6/09      S/P placement of cardiac pacemaker          SOCIAL HISTORY: Substance Use (street drugs): ( x ) never used  (  ) other:    FAMILY HISTORY:  No pertinent family history in first degree relatives        PHYSICAL EXAM:  T(C): 36.9 (03-18-24 @ 08:00), Max: 36.9 (03-18-24 @ 08:00)  HR: 87 (03-18-24 @ 15:10) (69 - 106)  BP: 101/43 (03-18-24 @ 08:00) (101/43 - 144/57)  RR: 22 (03-18-24 @ 08:00) (14 - 22)  SpO2: 97% (03-18-24 @ 15:10) (95% - 100%)  Wt(kg): --  I&O's Summary    17 Mar 2024 07:01  -  18 Mar 2024 07:00  --------------------------------------------------------  IN: 1200 mL / OUT: 1515 mL / NET: -315 mL            GENERAL: s/p trach  EYES: conjunctiva and sclera clear  ENMT:   Moist mucous membranes, Good dentition, No lesions  Cardiovascular: Normal S1 S2, No JVD, No murmurs, No edema  Respiratory: Lungs clear to auscultation	  Gastrointestinal:  s/p PEG   Extremities: no edema                                    8.7    5.22  )-----------( 278      ( 18 Mar 2024 05:09 )             28.0     03-18    144  |  102  |  40<H>  ----------------------------<  105<H>  4.5   |  29  |  1.25    Ca    8.8      18 Mar 2024 05:09  Phos  3.2     03-18  Mg     2.30     03-18    TPro  6.8  /  Alb  2.7<L>  /  TBili  0.3  /  DBili  x   /  AST  18  /  ALT  12  /  AlkPhos  125<H>  03-17    proBNP:   Lipid Profile:   HgA1c:   TSH:     Consultant(s) Notes Reviewed:  [x ] YES  [ ] NO    Care Discussed with Consultants/Other Providers [ x] YES  [ ] NO    Imaging Personally Reviewed independently:  [x] YES  [ ] NO    All labs, radiologic studies, vitals, orders and medications list reviewed. Patient is seen and examined at bedside. Case discussed with medical team.

## 2024-03-18 NOTE — CHART NOTE - NSCHARTNOTEFT_GEN_A_CORE
ACP NIGHT MEDICINE COVERAGE - Late Entry    Spoke to pt's son, Lázaro, via hospital phone while he was visiting the pt earlier this evening.  Per discussion, should pt become agitated and attempt to reach for trach, he is okay with low dose Ativan IVP, explained 0.25mg is the lowest dose.  Pt received this earlier in the day and also received Seroquel for agitation.  All questions were answered.  Will continue to monitor.    Rangel Natarajan PA-C  Department of Hospital Medicine - Night ACP  In-House Pager: #[phone]

## 2024-03-18 NOTE — PROGRESS NOTE ADULT - ASSESSMENT
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 Course ultimately complicated by progressive WOB and O2 demand and patient intubated and transferred to MICU (1/22). Course further complicated by acute cholecystitis and s/p Perc Lynsey with IR on (1/26), HFrEF 38, pulmonary edema, superimposed PNA, failed extubation failed and prolonged vent time and s/p trach (2/13). Patient transferred to RCU (2/16) and s/p PEG (2/20). Course complicated by volume overload and diuresis and GDMT continued and HFrEF 45 with improvement    NEUROLOGY   # Encephalopathy   - Hx of CVA with no residual deficits per family, however per family worsening confusion at home over the past 6 months (becoming disoriented to time) but prior to admission has been more confused, talking about seeing people that are not present.  - CTH (1/31) with no evidence of acute infarct  - Continue on ASA and Brilinta  - Holding Neurontin, Memantine and Oxycodone in setting of somnolence  3/17: +obtuneded overnight, CT Head: Mild chronic microvascular changes without evidence of an acute transcortical infarction or hemorrhage.  - VBG and Ammonia level grossly unremarkable  - Much awake and alert in the afternoon, agitated, will consider Seroquel/low dose Ativan if needed     # ICA stenosis   - CTA NECK (1/31) with moderate to severe narrowing left internal carotid at the origin. Severe narrowing right internal carotid by a tandem lesion 1.5 cm above the bifurcation with a narrowed internal carotid beyond the lesion. Extensive calcified plaque both cavernous and supraclinoid carotid arteries with narrowing but no occlusion. Mild narrowing proximal right M1 segment. Moderate narrowing both vertebral V4 segments. No perfusion abnormality at the Tmax 6 second threshold, but moderate hypoperfusion in the right MCA territory at the 4 second threshold.   - Continue on ASA and Brilinta    # Myoclonic jerks   - Hx of myoclonic jerks post CVAs   - EEG (1/18-2/6) negative for seizures  - Continue on Keppra and per neuro/ psych wishes to wean, family in agreement   - Currently down titrating Keppra 500 mg BID, now Keppra 250 mg BID 3/13 - )    # Depression/ Insomnia  - Continue on Lexapro per home medication   - Course complicated by agitation and pulling on tubes   - Home Seroquel discontinued as patient now on Valproic acid syrup   - S/p Ativan 0.5mg PO Q6H PRN and Haldol 0.25 mg Q6H for severe agitation, then s/p IM Zypexa 1.25 Q8H PRN for agitation    - Supportive care   - s/p Seroquel 12.5 x 1 w no significant improvement, still agitated, then Ativan 0.25mg x1   - Will monitor closely    # Agitation in setting of delirium / underlying vascular dementia   - BH following, c/w Depakene 125mg q8hr  - monitor platelets, LFTs while on Depakote   - C/w VPA TID (250mg QAM, 125mg afternoon, 375mg QHS)  - Recently, Pt had responded much better to Ativan than Zyprexa for agitation, however became obtunded, so Ativan d/c Ativan 0.5mg Q6H PRN    CARDIOLOGY   # Vasoplegic vs septic shock  # Hx of HTN   - Weaned off pressors in ICU and now with mild hypertension   - Continue on coreg and hydral as below   - Strict BP control given moderate AS  - Increased vascular congestion on CXR (3/9) so will give additional Lasix 20mg PO x1 (3/10)    # AFIB RVR   # Bifascicular Block with [employer] PPM   - AFIB RVR episodes and PPM interrogated (2/20) by EP with similar episodes  - Previous admission in 6/2022 with cardiogenic syncope second to bifasicular block, however now with PPM and AV myron blockers ok.   - Continue on lopressor 12.5mg BID however replaced with coreg second to HFrEF   - AC discussed at length however with recent GIB will hold for now     # HFrEF 38 likely stress induced?   # Mild to moderate AS   - ECHO (12/2023) with EF 62 with normal LVSF and mild to moderate AS   - ECHO (2/2024) with EF 38, moderate LVSD with global LV hypokinesis, mildly reduced RVSF (TAPSE 1.3), mild to moderate MR, mild AR, and moderate AS.   - RPT ECHO (3/4) with EF 45 and mild to moderate LVSD   - Continue on Lasix 20 QD, coreg 6.25 and hydralazine 50 (increased 3/4)   - Hold isordil and up titrate above medications first     # CAD with CABG   - CATH (5/2022) with dLAD 70, SVG graft to OM1 with 90 in stent stenosis s/p PCI and GEMMA placement, and LIMA graft to mLAD with no disease.   - Continue on ASA and Brilinta    # Prolonged QTC (Resolved)  - ECG (3/2) with QTC 616ms (corrected using bazzet 490ms)   - ECG (3/3) with QTC 634ms (corrected using bazzet 490ms)   - ECG (3/11) with QTC 490ms   - EKG (3/15) QTc 498ms  - Monitor off/ avoid QTC prolonging agents for now    RESPIRATORY   # Acute respiratory failure second to SARS-COV-2 vs pulm edema vs PNA  - Presented with COVID complicated by sepsis second to acute lynsey and worsening respiratory failure second to pulmonary edema requiring intubation.   - Prolonged intubation and s/p tracheostomy (2/13)   - CT CHEST 1/16 with new small bilateral pleural effusions/ atelectasis/ patchy bilateral ground-glass opacities are consistent with pulmonary edema.  - Completed ABX and COVID regimen as below   - Continue on vent and allow SBT as tolerated   - Continue on nebs and hold further HTS given intermittent hemoptysis  - Continue on diuresis as above    # Mild hemoptysis likely from suction trauma   - s/p bronch (2/18) with no active bleed  - Hemoptysis improved with TXA and holding further HTS as above   - Tiny hemoptysis second to suction trauma imporving  - Careful with suctioning   - Recurrent hemoptysis (3/9) so ordered for TXA nebs again however hemoptysis appeared self limited and stopped before nebs even given    HEENT   # L eye subconjunctival hemorrhage   - Continue on artifical tears and Lacrilube   - Optho eval appreciated     GI  # Nutrition/ Dysphagia   - PEG placed by GI on 2/20 and tube feeds continued.   - Loose stools and Banatrol continued     # UGIB   - EGD (1/2) with esophagitis and bleeding Dieulafoy's lesion s/p clips.   - Continue on PPI and carafate     # SMA calcification   - CTA demonstrating mesenteric fat stranding associated with ascending/transverse colon  - Diagnostic laparoscopy (12/24) with small bowel and visible colon viable, some inflammation of omentum in RUQ  - SMA Angiogram and stent placed (12/25).   - Continue on ASA and Brilinta     # Acute Cholecystitis   - CT (12/26) with distended GB with cholelithiasis and sludge   - HIDA (12/29) POSITIVE for acute cholecystitis   - Case discussed with IR at length and s/p PERC LYNSEY (1/26)   - Completed zosyn course (12/24-12/31)   - PERC LYNSEY tube to stay in until surgical candidate for cholecystectomy to prevent recurrence     / RENAL   # CKD   - CRE at baseline   - Monitor renal function and UOP   - Aranesp SQ x1 (3/8)    # Hypernatremia (resolved)   - s/p  Q4H  - Monitor closely with diuresis as above   - Stop FWF (3/15) given c/f worsening vol overload     INFECTIOUS DISEASE   # COVID with superimposed PNA   # ESBL Kleb PNA   - COVID POSITIVE (12/23) and completed remdesivir x 1 dose and paxlovid course.   - WOB worsened as above requiring intubation and cultures negative. Zosyn completed empirically however hypothermic (2/11) and BCx, UA, RVP and BAL negative.   - Completed additional zosyn (2/12-2/19) empirically   - Fever spike and thick secretions and SCX (2/26) with ESBL klebs.   - s/p Zosyn (2/27 - 2/29) but resistant and completed Erta (2/29 - 3/6)     HEME  # AOCD   - Monitor HH   - DVT PPX with HSQ     ENDOCRINE   # DM2 A1C 9.3   - Continue on Lantus 14 and ISS     SKIN/ TUBES   - Trach (2/13)   - PEG (2/20)   - PERC LYNSEY (1/26 - )     ETHICS   - FULL CODE     DISPO - vent facility and family aware. SW with accepting facility however pending available bed.  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 Course ultimately complicated by progressive WOB and O2 demand and patient intubated and transferred to MICU (1/22). Course further complicated by acute cholecystitis and s/p Perc Lynsey with IR on (1/26), HFrEF 38, pulmonary edema, superimposed PNA, failed extubation failed and prolonged vent time and s/p trach (2/13). Patient transferred to RCU (2/16) and s/p PEG (2/20). Course complicated by volume overload and diuresis and GDMT continued and HFrEF 45 with improvement    NEUROLOGY   # Encephalopathy   - Hx of CVA with no residual deficits per family, however per family worsening confusion at home over the past 6 months (becoming disoriented to time) but prior to admission has been more confused, talking about seeing people that are not present.  - CTH (1/31) with no evidence of acute infarct  - Continue on ASA and Brilinta  - Holding Neurontin, Memantine and Oxycodone in setting of somnolence  3/17: +obtuneded overnight, CT Head: Mild chronic microvascular changes without evidence of an acute transcortical infarction or hemorrhage.  - VBG and Ammonia level grossly unremarkable  - Much awake and alert in the afternoon, agitated, will consider Seroquel/low dose Ativan if needed   - 3/18 agitated in am     # ICA stenosis   - CTA NECK (1/31) with moderate to severe narrowing left internal carotid at the origin. Severe narrowing right internal carotid by a tandem lesion 1.5 cm above the bifurcation with a narrowed internal carotid beyond the lesion. Extensive calcified plaque both cavernous and supraclinoid carotid arteries with narrowing but no occlusion. Mild narrowing proximal right M1 segment. Moderate narrowing both vertebral V4 segments. No perfusion abnormality at the Tmax 6 second threshold, but moderate hypoperfusion in the right MCA territory at the 4 second threshold.   - Continue on ASA and Brilinta    # Myoclonic jerks   - Hx of myoclonic jerks post CVAs   - EEG (1/18-2/6) negative for seizures  - Continue on Keppra and per neuro/ psych wishes to wean, family in agreement   - Currently down titrating Keppra 500 mg BID, now Keppra 250 mg BID 3/13 - )    # Depression/ Insomnia  - Continue on Lexapro per home medication   - Course complicated by agitation and pulling on tubes   - Home Seroquel discontinued as patient now on Valproic acid syrup   - S/p Ativan 0.5mg PO Q6H PRN and Haldol 0.25 mg Q6H for severe agitation, then s/p IM Zypexa 1.25 Q8H PRN for agitation    - Supportive care   - s/p Seroquel 12.5 x 1 w no significant improvement, still agitated, then Ativan 0.25mg x1   - Will monitor closely    # Agitation in setting of delirium / underlying vascular dementia   - BH following, c/w Depakene 125mg q8hr  - monitor platelets, LFTs while on Depakote   - C/w VPA TID (250mg QAM, 125mg afternoon, 375mg QHS)  - Recently, Pt had responded much better to Ativan than Zyprexa for agitation, however became obtunded, so Ativan d/c Ativan 0.5mg Q6H PRN    CARDIOLOGY   # Vasoplegic vs septic shock  # Hx of HTN   - Weaned off pressors in ICU and now with mild hypertension   - Continue on coreg and hydral as below   - Strict BP control given moderate AS  - Increased vascular congestion on CXR (3/9) so will give additional Lasix 20mg PO x1 (3/10)    # AFIB RVR   # Bifascicular Block with [employer] PPM   - AFIB RVR episodes and PPM interrogated (2/20) by EP with similar episodes  - Previous admission in 6/2022 with cardiogenic syncope second to bifasicular block, however now with PPM and AV myron blockers ok.   - Continue on lopressor 12.5mg BID however replaced with coreg second to HFrEF   - AC discussed at length however with recent GIB will hold for now     # HFrEF 38 likely stress induced?   # Mild to moderate AS   - ECHO (12/2023) with EF 62 with normal LVSF and mild to moderate AS   - ECHO (2/2024) with EF 38, moderate LVSD with global LV hypokinesis, mildly reduced RVSF (TAPSE 1.3), mild to moderate MR, mild AR, and moderate AS.   - RPT ECHO (3/4) with EF 45 and mild to moderate LVSD   - Continue on Lasix 20 QD, coreg 6.25 and hydralazine 50 (increased 3/4)   - Hold isordil and up titrate above medications first     # CAD with CABG   - CATH (5/2022) with dLAD 70, SVG graft to OM1 with 90 in stent stenosis s/p PCI and GEMMA placement, and LIMA graft to mLAD with no disease.   - Continue on ASA and Brilinta    # Prolonged QTC (Resolved)  - ECG (3/2) with QTC 616ms (corrected using bazzet 490ms)   - ECG (3/3) with QTC 634ms (corrected using bazzet 490ms)   - ECG (3/11) with QTC 490ms   - EKG (3/15) QTc 498ms  - Monitor off/ avoid QTC prolonging agents for now      RESPIRATORY   # Acute respiratory failure second to SARS-COV-2 vs pulm edema vs PNA  - Presented with COVID complicated by sepsis second to acute lynsey and worsening respiratory failure second to pulmonary edema requiring intubation.   - Prolonged intubation and s/p tracheostomy (2/13)   - CT CHEST 1/16 with new small bilateral pleural effusions/ atelectasis/ patchy bilateral ground-glass opacities are consistent with pulmonary edema.  - Completed ABX and COVID regimen as below   - Continue on vent and allow SBT as tolerated   - Continue on nebs and hold further HTS given intermittent hemoptysis  - Continue on diuresis as above    # Mild hemoptysis likely from suction trauma   - s/p bronch (2/18) with no active bleed  - Hemoptysis improved with TXA and holding further HTS as above   - Tiny hemoptysis second to suction trauma imporving  - Careful with suctioning   - Recurrent hemoptysis (3/9) so ordered for TXA nebs again however hemoptysis appeared self limited and stopped before nebs even given    HEENT   # L eye subconjunctival hemorrhage   - Continue on artifical tears and Lacrilube   - Optho eval appreciated     GI  # Nutrition/ Dysphagia   - PEG placed by GI on 2/20 and tube feeds continued.   - Loose stools and Banatrol continued     # UGIB   - EGD (1/2) with esophagitis and bleeding Dieulafoy's lesion s/p clips.   - Continue on PPI and carafate     # SMA calcification   - CTA demonstrating mesenteric fat stranding associated with ascending/transverse colon  - Diagnostic laparoscopy (12/24) with small bowel and visible colon viable, some inflammation of omentum in RUQ  - SMA Angiogram and stent placed (12/25).   - Continue on ASA and Brilinta     # Acute Cholecystitis   - CT (12/26) with distended GB with cholelithiasis and sludge   - HIDA (12/29) POSITIVE for acute cholecystitis   - Case discussed with IR at length and s/p PERC LYNSEY (1/26)   - Completed zosyn course (12/24-12/31)   - PERC LYNSEY tube to stay in until surgical candidate for cholecystectomy to prevent recurrence     / RENAL   # CKD   - CRE at baseline   - Monitor renal function and UOP   - Aranesp SQ x1 (3/8)    # Hypernatremia (resolved)   - s/p  Q4H  - Monitor closely with diuresis as above   - Stop FWF (3/15) given c/f worsening vol overload     INFECTIOUS DISEASE   # COVID with superimposed PNA   # ESBL Kleb PNA   - COVID POSITIVE (12/23) and completed remdesivir x 1 dose and paxlovid course.   - WOB worsened as above requiring intubation and cultures negative. Zosyn completed empirically however hypothermic (2/11) and BCx, UA, RVP and BAL negative.   - Completed additional zosyn (2/12-2/19) empirically   - Fever spike and thick secretions and SCX (2/26) with ESBL klebs.   - s/p Zosyn (2/27 - 2/29) but resistant and completed Erta (2/29 - 3/6)     HEME  # AOCD   - Monitor HH   - DVT PPX with HSQ     ENDOCRINE   # DM2 A1C 9.3   - Continue on Lantus 14 and ISS     SKIN/ TUBES   - Trach (2/13)   - PEG (2/20)   - PERC LYNSEY (1/26 - )     ETHICS   - FULL CODE     DISPO - vent facility and family aware. SW with accepting facility however pending available bed.  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 Course ultimately complicated by progressive WOB and O2 demand and patient intubated and transferred to MICU (1/22). Course further complicated by acute cholecystitis and s/p Perc Lynsey with IR on (1/26), HFrEF 38, pulmonary edema, superimposed PNA, failed extubation failed and prolonged vent time and s/p trach (2/13). Patient transferred to RCU (2/16) and s/p PEG (2/20). Course complicated by volume overload and diuresis and GDMT continued and HFrEF 45 with improvement    NEUROLOGY   # Encephalopathy   - Hx of CVA with no residual deficits per family, however per family worsening confusion at home over the past 6 months (becoming disoriented to time) but prior to admission has been more confused, talking about seeing people that are not present.  - CTH (1/31) with no evidence of acute infarct  - Continue on ASA and Brilinta  - Holding Neurontin, Memantine and Oxycodone in setting of somnolence  3/17: +obtuneded overnight, CT Head: Mild chronic microvascular changes without evidence of an acute transcortical infarction or hemorrhage.  - VBG and Ammonia level grossly unremarkable  - Much awake and alert in the afternoon, agitated, will consider Seroquel/low dose Ativan if needed   - 3/18 agitated in am     # ICA stenosis   - CTA NECK (1/31) with moderate to severe narrowing left internal carotid at the origin. Severe narrowing right internal carotid by a tandem lesion 1.5 cm above the bifurcation with a narrowed internal carotid beyond the lesion. Extensive calcified plaque both cavernous and supraclinoid carotid arteries with narrowing but no occlusion. Mild narrowing proximal right M1 segment. Moderate narrowing both vertebral V4 segments. No perfusion abnormality at the Tmax 6 second threshold, but moderate hypoperfusion in the right MCA territory at the 4 second threshold.   - Continue on ASA and Brilinta    # Myoclonic jerks   - Hx of myoclonic jerks post CVAs   - EEG (1/18-2/6) negative for seizures  - Continue on Keppra and per neuro/ psych wishes to wean, family in agreement   - Currently down titrating Keppra 500 mg BID, now Keppra 250 mg BID 3/13 - )    # Depression/ Insomnia  - Continue on Lexapro per home medication   - Course complicated by agitation and pulling on tubes   - Home Seroquel discontinued as patient now on Valproic acid syrup   - S/p Ativan 0.5mg PO Q6H PRN and Haldol 0.25 mg Q6H for severe agitation, then s/p IM Zypexa 1.25 Q8H PRN for agitation    - Supportive care   - s/p Seroquel 12.5 x 1 w no significant improvement, still agitated, then Ativan 0.25mg x1   - Will monitor closely    # Agitation in setting of delirium / underlying vascular dementia   - BH following, c/w Depakene 125mg q8hr  - monitor platelets, LFTs while on Depakote   - C/w VPA TID (250mg QAM, 125mg afternoon, 375mg QHS)  - Recently, Pt had responded much better to Ativan than Zyprexa for agitation, however became obtunded, so Ativan d/c Ativan 0.5mg Q6H PRN  - FU by  today still requires CO /hand holding to prevent trach manipulation   - Start seroquel bid 25mg/50mg     CARDIOLOGY   # Vasoplegic vs septic shock  # Hx of HTN   - Weaned off pressors in ICU and now with mild hypertension   - Continue on coreg and hydral as below   - Strict BP control given moderate AS  - Increased vascular congestion on CXR (3/9) so will give additional Lasix 20mg PO x1 (3/10)    # AFIB RVR   # Bifascicular Block with [employer] PPM   - AFIB RVR episodes and PPM interrogated (2/20) by EP with similar episodes  - Previous admission in 6/2022 with cardiogenic syncope second to bifasicular block, however now with PPM and AV myron blockers ok.   - Continue on lopressor 12.5mg BID however replaced with coreg second to HFrEF   - AC discussed at length however with recent GIB will hold for now     # HFrEF 38 likely stress induced?   # Mild to moderate AS   - ECHO (12/2023) with EF 62 with normal LVSF and mild to moderate AS   - ECHO (2/2024) with EF 38, moderate LVSD with global LV hypokinesis, mildly reduced RVSF (TAPSE 1.3), mild to moderate MR, mild AR, and moderate AS.   - RPT ECHO (3/4) with EF 45 and mild to moderate LVSD   - Continue on Lasix 20 QD, coreg 6.25 and hydralazine 50 (increased 3/4)   - Hold isordil and up titrate above medications first     # CAD with CABG   - CATH (5/2022) with dLAD 70, SVG graft to OM1 with 90 in stent stenosis s/p PCI and GEMMA placement, and LIMA graft to mLAD with no disease.   - Continue on ASA and Brilinta    # Prolonged QTC (Resolved)  - ECG (3/2) with QTC 616ms (corrected using bazzet 490ms)   - ECG (3/3) with QTC 634ms (corrected using bazzet 490ms)   - ECG (3/11) with QTC 490ms   - EKG (3/15) QTc 498ms  - Monitor off/ avoid QTC prolonging agents for now      RESPIRATORY   # Acute respiratory failure second to SARS-COV-2 vs pulm edema vs PNA  - Presented with COVID complicated by sepsis second to acute lynsey and worsening respiratory failure second to pulmonary edema requiring intubation.   - Prolonged intubation and s/p tracheostomy (2/13)   - CT CHEST 1/16 with new small bilateral pleural effusions/ atelectasis/ patchy bilateral ground-glass opacities are consistent with pulmonary edema.  - Completed ABX and COVID regimen as below   - Continue on vent and allow SBT as tolerated   - Continue on nebs and hold further HTS given intermittent hemoptysis  - Continue on diuresis as above    # Mild hemoptysis likely from suction trauma   - s/p bronch (2/18) with no active bleed  - Hemoptysis improved with TXA and holding further HTS as above   - Tiny hemoptysis second to suction trauma imporving  - Careful with suctioning   - Recurrent hemoptysis (3/9) so ordered for TXA nebs again however hemoptysis appeared self limited and stopped before nebs even given    HEENT   # L eye subconjunctival hemorrhage   - Continue on artifical tears and Lacrilube   - Optho eval appreciated     GI  # Nutrition/ Dysphagia   - PEG placed by GI on 2/20 and tube feeds continued.   - Loose stools and Banatrol continued     # UGIB   - EGD (1/2) with esophagitis and bleeding Dieulafoy's lesion s/p clips.   - Continue on PPI and carafate     # SMA calcification   - CTA demonstrating mesenteric fat stranding associated with ascending/transverse colon  - Diagnostic laparoscopy (12/24) with small bowel and visible colon viable, some inflammation of omentum in RUQ  - SMA Angiogram and stent placed (12/25).   - Continue on ASA and Brilinta     # Acute Cholecystitis   - CT (12/26) with distended GB with cholelithiasis and sludge   - HIDA (12/29) POSITIVE for acute cholecystitis   - Case discussed with IR at length and s/p PERC LYNSEY (1/26)   - Completed zosyn course (12/24-12/31)   - PERC LYNSEY tube to stay in until surgical candidate for cholecystectomy to prevent recurrence     / RENAL   # CKD   - CRE at baseline   - Monitor renal function and UOP   - Aranesp SQ x1 (3/8)    # Hypernatremia (resolved)   - s/p  Q4H  - Monitor closely with diuresis as above   - Stop FWF (3/15) given c/f worsening vol overload     INFECTIOUS DISEASE   # COVID with superimposed PNA   # ESBL Kleb PNA   - COVID POSITIVE (12/23) and completed remdesivir x 1 dose and paxlovid course.   - WOB worsened as above requiring intubation and cultures negative. Zosyn completed empirically however hypothermic (2/11) and BCx, UA, RVP and BAL negative.   - Completed additional zosyn (2/12-2/19) empirically   - Fever spike and thick secretions and SCX (2/26) with ESBL klebs.   - s/p Zosyn (2/27 - 2/29) but resistant and completed Erta (2/29 - 3/6)     HEME  # AOCD   - Monitor HH   - DVT PPX with HSQ     ENDOCRINE   # DM2 A1C 9.3   - Continue on Lantus 14 and ISS     SKIN/ TUBES   - Trach (2/13)   - PEG (2/20)   - PERC LYNSEY (1/26 - )     ETHICS   - FULL CODE     DISPO - vent facility and family aware. SW with accepting facility however pending available bed.

## 2024-03-18 NOTE — PROGRESS NOTE ADULT - ASSESSMENT
IMPRESSION:  89M w/ DM2, HTN, CVA, PAD, CKD3, and CAD-CABG, 12/23/23 p/w COVID19 infection, c/b respiratory failure/hypotension; now s/p tracheostomy    (1)Renal - CKD3; superimposed mild prerenal azotemia - numbers fluctuating based on hemodynamic status, slightly higher   (2)CV - now off pressors and midodrine, BP improved/stable    (3)Pulm - vent-dependent   (4)ID - afebrile, s/p zosyn   (5)GI - s/p PEG (2/20)  (6)Hypernatremia - possibly due to diuresis, Na+ improved    RECOMMEND:   (1)a/w lasix 20 mg iv  PRN   (2)flush PEG as needed   (3)Continue meds for GFR 40-50ml/min  (4)give  aranesp 60 mcg Sq x 1 today.       Elsamayela Nunez  [facility]  Office: [phone]       IMPRESSION:  89M w/ DM2, HTN, CVA, PAD, CKD3, and CAD-CABG, 12/23/23 p/w COVID19 infection, c/b respiratory failure/hypotension; now s/p tracheostomy    (1)Renal - CKD3; superimposed mild prerenal azotemia - numbers fluctuating based on hemodynamic status, slightly higher   (2)CV - now off pressors and midodrine, BP improved/stable    (3)Pulm - vent-dependent   (4)ID - afebrile, s/p zosyn   (5)GI - s/p PEG (2/20)  (6)Hypernatremia - possibly due to diuresis, Na+ improved    RECOMMEND:   (1)a/w lasix 20 mg iv  PRN   (2)flush PEG as needed   (3)Continue meds for GFR 40-50ml/min  (4)give  aranesp 60 mcg Sq x 1 today.           [facility]  Office: [phone]    [facility]  Office: [phone]

## 2024-03-18 NOTE — PROGRESS NOTE ADULT - SUBJECTIVE AND OBJECTIVE BOX
NEPHROLOGY     Patient seen and examined with staff at bedside, pt trach to vent, no acute distress.   still agitated   MEDICATIONS  (STANDING):  albuterol/ipratropium for Nebulization 3 milliLiter(s) Nebulizer every 12 hours  artificial  tears Solution 1 Drop(s) Left EYE four times a day  aspirin  chewable 81 milliGRAM(s) Enteral Tube daily  carvedilol 6.25 milliGRAM(s) Oral every 12 hours  chlorhexidine 0.12% Liquid 15 milliLiter(s) Oral Mucosa every 12 hours  chlorhexidine 2% Cloths 1 Application(s) Topical daily  dextrose 5%. 1000 milliLiter(s) (100 mL/Hr) IV Continuous <Continuous>  dextrose 5%. 1000 milliLiter(s) (50 mL/Hr) IV Continuous <Continuous>  dextrose 50% Injectable 25 Gram(s) IV Push once  dextrose 50% Injectable 25 Gram(s) IV Push once  dextrose 50% Injectable 12.5 Gram(s) IV Push once  divalproex Sprinkle 125 milliGRAM(s) Oral two times a day  doxazosin 2 milliGRAM(s) Oral at bedtime  escitalopram Solution 10 milliGRAM(s) Oral daily  furosemide    Tablet 20 milliGRAM(s) Oral daily  glucagon  Injectable 1 milliGRAM(s) IntraMuscular once  heparin   Injectable 5000 Unit(s) SubCutaneous every 8 hours  hydrALAZINE 50 milliGRAM(s) Oral every 8 hours  insulin glargine Injectable (LANTUS) 14 Unit(s) SubCutaneous <User Schedule>  insulin lispro (ADMELOG) corrective regimen sliding scale   SubCutaneous every 6 hours  levETIRAcetam  Solution 500 milliGRAM(s) Oral two times a day  melatonin 6 milliGRAM(s) Oral at bedtime  pantoprazole   Suspension 40 milliGRAM(s) Oral every 12 hours  petrolatum Ophthalmic Ointment 1 Application(s) Left EYE at bedtime  polyethylene glycol 3350 17 Gram(s) Oral daily  senna Syrup 10 milliLiter(s) Oral at bedtime  sucralfate suspension 1 Gram(s) Enteral Tube two times a day  ticagrelor 60 milliGRAM(s) Oral every 12 hours    VITALS:  T(C): , Max: 37 (03-11-24 @ 11:51)  T(F): , Max: 98.6 (03-11-24 @ 11:51)  HR: 95 (03-12-24 @ 11:30)  BP: 113/52 (03-12-24 @ 09:00)  BP(mean): 57 (03-12-24 @ 09:00)  RR: 18 (03-12-24 @ 09:00)  SpO2: 97% (03-12-24 @ 11:30)  Wt(kg): --  I and O's:    03-11 @ 07:01  -  03-12 @ 07:00  --------------------------------------------------------  IN: 1450 mL / OUT: 20 mL / NET: 1430 mL    PHYSICAL EXAM:  Constitutional: trach to vent   HEENT: +trach  Respiratory: coarse bs   Cardiovascular: normal s1s2  Gastrointestinal: BS+, soft, NT/ND +PEG  Extremities:+ peripheral edema  : + external catheter   Skin: No rashes    LABS:                        8.0    8.32  )-----------( 340      ( 12 Mar 2024 05:30 )             25.5     03-12    137  |  97<L>  |  50<H>  ----------------------------<  190<H>  4.7   |  30  |  1.50<H>    Ca    9.0      12 Mar 2024 05:30  Phos  3.8     03-12  Mg     2.50     03-12    TPro  7.2  /  Alb  3.0<L>  /  TBili  0.3  /  DBili  <0.2  /  AST  22  /  ALT  19  /  AlkPhos  176<H>  03-12      Urine Studies:  Urinalysis Basic - ( 12 Mar 2024 05:30 )    Color: x / Appearance: x / SG: x / pH: x  Gluc: 190 mg/dL / Ketone: x  / Bili: x / Urobili: x   Blood: x / Protein: x / Nitrite: x   Leuk Esterase: x / RBC: x / WBC x   Sq Epi: x / Non Sq Epi: x / Bacteria: x    RADIOLOGY & ADDITIONAL STUDIES:    rad< from: US Duplex Venous Lower Ext Complete, Bilateral (03.11.24 @ 19:29) >  IMPRESSION:  No evidence of deep venous thrombosis in either lower extremity.    Increased venous pulsatility suggestive of right-sided cardiac   dysfunction.        --- End of Report ---      ADEBAYO PALACIO MD; Attending Radiologist  This document has been electronically signed. Mar 11 2024  7:37PM    < end of copied text >

## 2024-03-18 NOTE — BH CONSULTATION LIAISON PROGRESS NOTE - NSBHASSESSMENTFT_PSY_ALL_CORE
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 Course ultimately complicated by progressive WOB and O2 demand and patient intubated and transferred to MICU (1/22). Course further complicated by acute cholecystitis and s/p Perc Lynsey with IR on (1/26), HFrEF 38, pulmonary edema, superimposed PNA, failed extubation failed and prolonged vent time and s/p trach (2/13). Patient transferred to RCU (2/16) and s/p PEG (2/20). Course complicated by volume overload and diuresis and GDMT continued and HFrEF 45 with improvement.  No known psychiatric history. Psychiatry consulted for agitation. He has been receiving PO and IM ativan several times per day, concern for oversedation. Son and DIL are physicians and requested consult.     Due to multiple medical comorbities and prolonged QTc, would hold off on antipsychotics and other QT prolonging agents.   Ativan is not an ideal PRN for this patient given deliriogenic effects and paradoxical activation.  Discussed with family plan to start low dose depakote for impulsivity and agitation and uptitrated as tolerated.    3/12: received ativan x2 overnight for ongoing agitation. LFTs wnl. Will continue to increase depakote and monitor. Would recommend cross tapering with keppra as keppra may be contributing to agitated delirium. Discussed with family.     3/14: some improvement in agitation with depakote, repeat EKG with QTc <500, still proceed with caution with antipsychotics, though antipsychotics are preferable to benzos for agitation in delirium    3/15: still requiring PRNs, appears to respond better to ativan than to olanzapine   - Depakote decreased to 125mg TID in response to hyperammonemia    3/18: ammonia improved, remains agitated requiring PRNs. Now that EKG has improved, can start standing seroquel for agitation and titrate as tolerated, monitor QTc.       Plan:  - START quetiapine 25mg qd, 50mg qhs for delirium and agitation  - continue with depakote 125mg TID  impulsivity and agitation  - defer taper of keppra to primary team  - delirium precautions   - monitor LFTs, platelets, ammonia, VPA level   - of note, his VPA level will need to be adjusted for low albumin- please indicate this on DC summary  - monitor EKG, REPEAT TODAY  - continue using ativan 0.5mg q6h PRN for severe agitation per RCU   89 YO M with very complicated medical history and prolonged hospital course, notable for PMHx of CAD s/p CABG and GEMMA (last GEMMA 5/2022 on ASA and Brilinta), 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 Course ultimately complicated by progressive WOB and O2 demand and patient intubated and transferred to MICU (1/22). Course further complicated by acute cholecystitis and s/p Perc Lynsey with IR on (1/26), HFrEF 38, pulmonary edema, superimposed PNA, failed extubation failed and prolonged vent time and s/p trach (2/13). Patient transferred to RCU (2/16) and s/p PEG (2/20). Course complicated by volume overload and diuresis and GDMT continued and HFrEF 45 with improvement.  No known psychiatric history. Psychiatry consulted for agitation. He has been receiving PO and IM ativan several times per day, concern for oversedation. Son and DIL are physicians and requested consult.     Due to multiple medical comorbities and prolonged QTc, would hold off on antipsychotics and other QT prolonging agents.   Ativan is not an ideal PRN for this patient given deliriogenic effects and paradoxical activation.  Discussed with family plan to start low dose depakote for impulsivity and agitation and uptitrated as tolerated.    3/12: received ativan x2 overnight for ongoing agitation. LFTs wnl. Will continue to increase depakote and monitor. Would recommend cross tapering with keppra as keppra may be contributing to agitated delirium. Discussed with family.     3/14: some improvement in agitation with depakote, repeat EKG with QTc <500, still proceed with caution with antipsychotics, though antipsychotics are preferable to benzos for agitation in delirium    3/15: still requiring PRNs, appears to respond better to ativan than to olanzapine   - Depakote decreased to 125mg TID in response to hyperammonemia    3/18: ammonia improved, remains agitated requiring PRNs. Now that EKG has improved, can start standing seroquel for agitation and titrate as tolerated, monitor QTc.       Plan:  - START quetiapine 25mg @ 8am and 50mg qhs for delirium and agitation  - continue with depakote 125mg TID  impulsivity and agitation  - defer taper of keppra to primary team  - delirium precautions   - monitor LFTs, platelets, ammonia, VPA level   - of note, his VPA level will need to be adjusted for low albumin- please indicate this on DC summary  - monitor EKG, REPEAT TODAY  - continue using ativan 0.5mg q6h PRN for severe agitation per RCU

## 2024-03-18 NOTE — PROGRESS NOTE ADULT - SUBJECTIVE AND OBJECTIVE BOX
CHIEF COMPLAINT: Patient is a 89y old  Male who presents with a chief complaint of COVID19, Chest pain (17 Mar 2024 09:00)      Interval Events: No acute overnight events     REVIEW OF SYSTEMS:    [x ] Unable to assess ROS because ________    Mode: AC/ CMV (Assist Control/ Continuous Mandatory Ventilation), RR (machine): 16, TV (machine): 400, FiO2: 30, PEEP: 5, ITime: 0.64, MAP: 11, PC: 12, PIP: 33      OBJECTIVE:  ICU Vital Signs Last 24 Hrs  T(C): 36.6 (17 Mar 2024 16:00), Max: 36.6 (17 Mar 2024 12:00)  T(F): 97.9 (17 Mar 2024 16:00), Max: 97.9 (17 Mar 2024 12:00)  HR: 78 (18 Mar 2024 03:25) (75 - 106)  BP: 123/37 (17 Mar 2024 17:53) (104/51 - 144/57)  BP(mean): --  ABP: --  ABP(mean): --  RR: 18 (17 Mar 2024 17:53) (16 - 20)  SpO2: 95% (18 Mar 2024 03:25) (95% - 100%)    O2 Parameters below as of 17 Mar 2024 20:25  Patient On (Oxygen Delivery Method): ventilator          Mode: AC/ CMV (Assist Control/ Continuous Mandatory Ventilation), RR (machine): 16, TV (machine): 400, FiO2: 30, PEEP: 5, ITime: 0.64, MAP: 11, PC: 12, PIP: 33    03-17 @ 07:01  -  03-18 @ 07:00  --------------------------------------------------------  IN: 600 mL / OUT: 605 mL / NET: -5 mL      CAPILLARY BLOOD GLUCOSE      POCT Blood Glucose.: 126 mg/dL (18 Mar 2024 06:46)      HOSPITAL MEDICATIONS:  MEDICATIONS  (STANDING):  albuterol/ipratropium for Nebulization 3 milliLiter(s) Nebulizer every 12 hours  artificial  tears Solution 1 Drop(s) Left EYE four times a day  aspirin  chewable 81 milliGRAM(s) Enteral Tube daily  carvedilol 6.25 milliGRAM(s) Oral every 12 hours  chlorhexidine 0.12% Liquid 15 milliLiter(s) Oral Mucosa every 12 hours  chlorhexidine 2% Cloths 1 Application(s) Topical daily  dextrose 5%. 1000 milliLiter(s) (50 mL/Hr) IV Continuous <Continuous>  dextrose 5%. 1000 milliLiter(s) (100 mL/Hr) IV Continuous <Continuous>  dextrose 50% Injectable 25 Gram(s) IV Push once  dextrose 50% Injectable 25 Gram(s) IV Push once  dextrose 50% Injectable 12.5 Gram(s) IV Push once  doxazosin 2 milliGRAM(s) Oral at bedtime  escitalopram Solution 10 milliGRAM(s) Oral daily  furosemide    Tablet 20 milliGRAM(s) Oral daily  glucagon  Injectable 1 milliGRAM(s) IntraMuscular once  heparin   Injectable 5000 Unit(s) SubCutaneous every 8 hours  hydrALAZINE 25 milliGRAM(s) Oral every 8 hours  insulin glargine Injectable (LANTUS) 14 Unit(s) SubCutaneous <User Schedule>  insulin lispro (ADMELOG) corrective regimen sliding scale   SubCutaneous every 6 hours  levETIRAcetam  Solution 250 milliGRAM(s) Oral two times a day  melatonin 6 milliGRAM(s) Oral at bedtime  pantoprazole   Suspension 40 milliGRAM(s) Oral every 12 hours  petrolatum Ophthalmic Ointment 1 Application(s) Left EYE at bedtime  polyethylene glycol 3350 17 Gram(s) Oral daily  senna Syrup 10 milliLiter(s) Oral at bedtime  sucralfate suspension 1 Gram(s) Enteral Tube two times a day  ticagrelor 60 milliGRAM(s) Oral every 12 hours  valproic  acid Syrup 125 milliGRAM(s) Oral every 8 hours    MEDICATIONS  (PRN):  acetaminophen   Oral Liquid .. 650 milliGRAM(s) Oral every 6 hours PRN Temp greater or equal to 38C (100.4F), Mild Pain (1 - 3), Moderate Pain (4 - 6)  dextrose Oral Gel 15 Gram(s) Oral once PRN Blood Glucose LESS THAN 70 milliGRAM(s)/deciliter      LABS:                        8.8    9.69  )-----------( 306      ( 17 Mar 2024 05:48 )             28.1     03-17    139  |  99  |  40<H>  ----------------------------<  130<H>  4.3   |  29  |  1.24    Ca    8.6      17 Mar 2024 05:48  Phos  2.6     03-17  Mg     2.20     03-17    TPro  6.8  /  Alb  2.7<L>  /  TBili  0.3  /  DBili  x   /  AST  18  /  ALT  12  /  AlkPhos  125<H>  03-17    PT/INR - ( 16 Mar 2024 18:21 )   PT: 11.9 sec;   INR: 1.06 ratio         PTT - ( 16 Mar 2024 18:21 )  PTT:30.4 sec  Urinalysis Basic - ( 17 Mar 2024 05:48 )    Color: x / Appearance: x / SG: x / pH: x  Gluc: 130 mg/dL / Ketone: x  / Bili: x / Urobili: x   Blood: x / Protein: x / Nitrite: x   Leuk Esterase: x / RBC: x / WBC x   Sq Epi: x / Non Sq Epi: x / Bacteria: x        Venous Blood Gas:  03-17 @ 05:48  7.40/55/32/34/49.7  VBG Lactate: 2.7  Venous Blood Gas:  03-16 @ 18:05  7.45/51/49/35/80.9  VBG Lactate: 1.8      PAST MEDICAL & SURGICAL HISTORY:  Hyperlipemia      Hypertension      Coronary Artery Disease      Diabetes Mellitus Type II      Stented Coronary Artery  total 5 stents, last stent 5/2019      Neuropathy      Myocardial infarction      Stroke  mild left facial numbness   no other residuals verbalized      Myoclonic jerking      Stage 3 chronic kidney disease      History of Cataract Extraction      Hx of CABG      H/O coronary angiogram      S/P coronary artery stent placement  1/6/09      S/P placement of cardiac pacemaker          FAMILY HISTORY:  No pertinent family history in first degree relatives        Social History:  Lives with family  Ambulates with walker  Denies tobacco, EtOH, or illicit substance use (23 Dec 2023 22:51)      RADIOLOGY:  [ ] Reviewed and interpreted by me    PULMONARY FUNCTION TESTS:    EKG: CHIEF COMPLAINT: Patient is a 89y old  Male who presents with a chief complaint of COVID19, Chest pain (17 Mar 2024 09:00)      Interval Events: No acute overnight events     REVIEW OF SYSTEMS:  [x ] Unable to assess ROS because trach    Mode: AC/ CMV (Assist Control/ Continuous Mandatory Ventilation), RR (machine): 16, TV (machine): 400, FiO2: 30, PEEP: 5, ITime: 0.64, MAP: 11, PC: 12, PIP: 33      OBJECTIVE:  ICU Vital Signs Last 24 Hrs  T(C): 36.6 (17 Mar 2024 16:00), Max: 36.6 (17 Mar 2024 12:00)  T(F): 97.9 (17 Mar 2024 16:00), Max: 97.9 (17 Mar 2024 12:00)  HR: 78 (18 Mar 2024 03:25) (75 - 106)  BP: 123/37 (17 Mar 2024 17:53) (104/51 - 144/57)  BP(mean): --  ABP: --  ABP(mean): --  RR: 18 (17 Mar 2024 17:53) (16 - 20)  SpO2: 95% (18 Mar 2024 03:25) (95% - 100%)    O2 Parameters below as of 17 Mar 2024 20:25  Patient On (Oxygen Delivery Method): ventilator          Mode: AC/ CMV (Assist Control/ Continuous Mandatory Ventilation), RR (machine): 16, TV (machine): 400, FiO2: 30, PEEP: 5, ITime: 0.64, MAP: 11, PC: 12, PIP: 33    03-17 @ 07:01  -  03-18 @ 07:00  --------------------------------------------------------  IN: 600 mL / OUT: 605 mL / NET: -5 mL      CAPILLARY BLOOD GLUCOSE      POCT Blood Glucose.: 126 mg/dL (18 Mar 2024 06:46)      HOSPITAL MEDICATIONS:  MEDICATIONS  (STANDING):  albuterol/ipratropium for Nebulization 3 milliLiter(s) Nebulizer every 12 hours  artificial  tears Solution 1 Drop(s) Left EYE four times a day  aspirin  chewable 81 milliGRAM(s) Enteral Tube daily  carvedilol 6.25 milliGRAM(s) Oral every 12 hours  chlorhexidine 0.12% Liquid 15 milliLiter(s) Oral Mucosa every 12 hours  chlorhexidine 2% Cloths 1 Application(s) Topical daily  dextrose 5%. 1000 milliLiter(s) (50 mL/Hr) IV Continuous <Continuous>  dextrose 5%. 1000 milliLiter(s) (100 mL/Hr) IV Continuous <Continuous>  dextrose 50% Injectable 25 Gram(s) IV Push once  dextrose 50% Injectable 25 Gram(s) IV Push once  dextrose 50% Injectable 12.5 Gram(s) IV Push once  doxazosin 2 milliGRAM(s) Oral at bedtime  escitalopram Solution 10 milliGRAM(s) Oral daily  furosemide    Tablet 20 milliGRAM(s) Oral daily  glucagon  Injectable 1 milliGRAM(s) IntraMuscular once  heparin   Injectable 5000 Unit(s) SubCutaneous every 8 hours  hydrALAZINE 25 milliGRAM(s) Oral every 8 hours  insulin glargine Injectable (LANTUS) 14 Unit(s) SubCutaneous <User Schedule>  insulin lispro (ADMELOG) corrective regimen sliding scale   SubCutaneous every 6 hours  levETIRAcetam  Solution 250 milliGRAM(s) Oral two times a day  melatonin 6 milliGRAM(s) Oral at bedtime  pantoprazole   Suspension 40 milliGRAM(s) Oral every 12 hours  petrolatum Ophthalmic Ointment 1 Application(s) Left EYE at bedtime  polyethylene glycol 3350 17 Gram(s) Oral daily  senna Syrup 10 milliLiter(s) Oral at bedtime  sucralfate suspension 1 Gram(s) Enteral Tube two times a day  ticagrelor 60 milliGRAM(s) Oral every 12 hours  valproic  acid Syrup 125 milliGRAM(s) Oral every 8 hours    MEDICATIONS  (PRN):  acetaminophen   Oral Liquid .. 650 milliGRAM(s) Oral every 6 hours PRN Temp greater or equal to 38C (100.4F), Mild Pain (1 - 3), Moderate Pain (4 - 6)  dextrose Oral Gel 15 Gram(s) Oral once PRN Blood Glucose LESS THAN 70 milliGRAM(s)/deciliter      LABS:                        8.8    9.69  )-----------( 306      ( 17 Mar 2024 05:48 )             28.1     03-17    139  |  99  |  40<H>  ----------------------------<  130<H>  4.3   |  29  |  1.24    Ca    8.6      17 Mar 2024 05:48  Phos  2.6     03-17  Mg     2.20     03-17    TPro  6.8  /  Alb  2.7<L>  /  TBili  0.3  /  DBili  x   /  AST  18  /  ALT  12  /  AlkPhos  125<H>  03-17    PT/INR - ( 16 Mar 2024 18:21 )   PT: 11.9 sec;   INR: 1.06 ratio         PTT - ( 16 Mar 2024 18:21 )  PTT:30.4 sec  Urinalysis Basic - ( 17 Mar 2024 05:48 )    Color: x / Appearance: x / SG: x / pH: x  Gluc: 130 mg/dL / Ketone: x  / Bili: x / Urobili: x   Blood: x / Protein: x / Nitrite: x   Leuk Esterase: x / RBC: x / WBC x   Sq Epi: x / Non Sq Epi: x / Bacteria: x        Venous Blood Gas:  03-17 @ 05:48  7.40/55/32/34/49.7  VBG Lactate: 2.7  Venous Blood Gas:  03-16 @ 18:05  7.45/51/49/35/80.9  VBG Lactate: 1.8      PAST MEDICAL & SURGICAL HISTORY:  Hyperlipemia      Hypertension      Coronary Artery Disease      Diabetes Mellitus Type II      Stented Coronary Artery  total 5 stents, last stent 5/2019      Neuropathy      Myocardial infarction      Stroke  mild left facial numbness   no other residuals verbalized      Myoclonic jerking      Stage 3 chronic kidney disease      History of Cataract Extraction      Hx of CABG      H/O coronary angiogram      S/P coronary artery stent placement  1/6/09      S/P placement of cardiac pacemaker          FAMILY HISTORY:  No pertinent family history in first degree relatives        Social History:  Lives with family  Ambulates with walker  Denies tobacco, EtOH, or illicit substance use (23 Dec 2023 22:51)      RADIOLOGY:  [ ] Reviewed and interpreted by me    PULMONARY FUNCTION TESTS:    EKG:

## 2024-03-18 NOTE — PROGRESS NOTE ADULT - NS ATTEND AMEND GEN_ALL_CORE FT
89 yo M w/ PMH of CAD sp CABG, bifascicular block sp PPM, HTN, CKD, CVA,     Neuro: hx of CVA, encephalopathy. Currently awake, periods of intermittent agitation, on 1:1. On Seroquel previously - now discontinued. On Depakote 125 q8h - weaning off, Ativan 0.25 IV q6h prn for agitation. Over the weekend became less responsive, normal ammonia level, normal Depakote level. Keppra being tapered as well - now on 250 q12h. Appreciate behavioral health input.    CVS:  CAD, Afib, HFrEF - TTE 45% 3/4, coreg, hydralazine, not on AC due to GIB, lasix 20 mg daily, on ASA and Brillinta   GI: on PPI and carafate  Resp: on VCAC - trials of PS daily.  ID: off antibiotics, completed etrapenem on 3/6, recently treated w/ Klebsiella PNA

## 2024-03-19 LAB
ANION GAP SERPL CALC-SCNC: 13 MMOL/L — SIGNIFICANT CHANGE UP (ref 7–14)
BUN SERPL-MCNC: 39 MG/DL — HIGH (ref 7–23)
CALCIUM SERPL-MCNC: 8.7 MG/DL — SIGNIFICANT CHANGE UP (ref 8.4–10.5)
CHLORIDE SERPL-SCNC: 99 MMOL/L — SIGNIFICANT CHANGE UP (ref 98–107)
CO2 SERPL-SCNC: 29 MMOL/L — SIGNIFICANT CHANGE UP (ref 22–31)
CREAT SERPL-MCNC: 1.22 MG/DL — SIGNIFICANT CHANGE UP (ref 0.5–1.3)
EGFR: 56 ML/MIN/1.73M2 — LOW
GLUCOSE BLDC GLUCOMTR-MCNC: 130 MG/DL — HIGH (ref 70–99)
GLUCOSE BLDC GLUCOMTR-MCNC: 147 MG/DL — HIGH (ref 70–99)
GLUCOSE BLDC GLUCOMTR-MCNC: 148 MG/DL — HIGH (ref 70–99)
GLUCOSE BLDC GLUCOMTR-MCNC: 87 MG/DL — SIGNIFICANT CHANGE UP (ref 70–99)
GLUCOSE BLDC GLUCOMTR-MCNC: 99 MG/DL — SIGNIFICANT CHANGE UP (ref 70–99)
GLUCOSE SERPL-MCNC: 113 MG/DL — HIGH (ref 70–99)
HCT VFR BLD CALC: 26.3 % — LOW (ref 39–50)
HGB BLD-MCNC: 8.5 G/DL — LOW (ref 13–17)
MAGNESIUM SERPL-MCNC: 2.2 MG/DL — SIGNIFICANT CHANGE UP (ref 1.6–2.6)
MCHC RBC-ENTMCNC: 29.5 PG — SIGNIFICANT CHANGE UP (ref 27–34)
MCHC RBC-ENTMCNC: 32.3 GM/DL — SIGNIFICANT CHANGE UP (ref 32–36)
MCV RBC AUTO: 91.3 FL — SIGNIFICANT CHANGE UP (ref 80–100)
NRBC # BLD: 0 /100 WBCS — SIGNIFICANT CHANGE UP (ref 0–0)
NRBC # FLD: 0 K/UL — SIGNIFICANT CHANGE UP (ref 0–0)
PHOSPHATE SERPL-MCNC: 3.5 MG/DL — SIGNIFICANT CHANGE UP (ref 2.5–4.5)
PLATELET # BLD AUTO: 282 K/UL — SIGNIFICANT CHANGE UP (ref 150–400)
POTASSIUM SERPL-MCNC: 4.1 MMOL/L — SIGNIFICANT CHANGE UP (ref 3.5–5.3)
POTASSIUM SERPL-SCNC: 4.1 MMOL/L — SIGNIFICANT CHANGE UP (ref 3.5–5.3)
RBC # BLD: 2.88 M/UL — LOW (ref 4.2–5.8)
RBC # FLD: 15.9 % — HIGH (ref 10.3–14.5)
SODIUM SERPL-SCNC: 141 MMOL/L — SIGNIFICANT CHANGE UP (ref 135–145)
WBC # BLD: 6.41 K/UL — SIGNIFICANT CHANGE UP (ref 3.8–10.5)
WBC # FLD AUTO: 6.41 K/UL — SIGNIFICANT CHANGE UP (ref 3.8–10.5)

## 2024-03-19 PROCEDURE — 99233 SBSQ HOSP IP/OBS HIGH 50: CPT | Mod: FS

## 2024-03-19 PROCEDURE — 93010 ELECTROCARDIOGRAM REPORT: CPT

## 2024-03-19 RX ADMIN — Medication 1 DROP(S): at 06:20

## 2024-03-19 RX ADMIN — Medication 6 MILLIGRAM(S): at 21:10

## 2024-03-19 RX ADMIN — Medication 1 APPLICATION(S): at 21:10

## 2024-03-19 RX ADMIN — QUETIAPINE FUMARATE 25 MILLIGRAM(S): 200 TABLET, FILM COATED ORAL at 07:45

## 2024-03-19 RX ADMIN — TICAGRELOR 60 MILLIGRAM(S): 90 TABLET ORAL at 06:21

## 2024-03-19 RX ADMIN — Medication 3 MILLILITER(S): at 08:46

## 2024-03-19 RX ADMIN — Medication 25 MILLIGRAM(S): at 06:21

## 2024-03-19 RX ADMIN — INSULIN GLARGINE 14 UNIT(S): 100 INJECTION, SOLUTION SUBCUTANEOUS at 11:10

## 2024-03-19 RX ADMIN — Medication 25 MILLIGRAM(S): at 14:01

## 2024-03-19 RX ADMIN — TICAGRELOR 60 MILLIGRAM(S): 90 TABLET ORAL at 18:07

## 2024-03-19 RX ADMIN — CHLORHEXIDINE GLUCONATE 15 MILLILITER(S): 213 SOLUTION TOPICAL at 06:22

## 2024-03-19 RX ADMIN — HEPARIN SODIUM 5000 UNIT(S): 5000 INJECTION INTRAVENOUS; SUBCUTANEOUS at 21:09

## 2024-03-19 RX ADMIN — Medication 60 MICROGRAM(S): at 21:08

## 2024-03-19 RX ADMIN — Medication 1 DROP(S): at 11:16

## 2024-03-19 RX ADMIN — PANTOPRAZOLE SODIUM 40 MILLIGRAM(S): 20 TABLET, DELAYED RELEASE ORAL at 06:21

## 2024-03-19 RX ADMIN — Medication 25 MILLIGRAM(S): at 21:09

## 2024-03-19 RX ADMIN — HEPARIN SODIUM 5000 UNIT(S): 5000 INJECTION INTRAVENOUS; SUBCUTANEOUS at 14:08

## 2024-03-19 RX ADMIN — Medication 1 GRAM(S): at 06:20

## 2024-03-19 RX ADMIN — CARVEDILOL PHOSPHATE 6.25 MILLIGRAM(S): 80 CAPSULE, EXTENDED RELEASE ORAL at 18:07

## 2024-03-19 RX ADMIN — LEVETIRACETAM 250 MILLIGRAM(S): 250 TABLET, FILM COATED ORAL at 18:05

## 2024-03-19 RX ADMIN — CHLORHEXIDINE GLUCONATE 1 APPLICATION(S): 213 SOLUTION TOPICAL at 11:11

## 2024-03-19 RX ADMIN — Medication 81 MILLIGRAM(S): at 11:12

## 2024-03-19 RX ADMIN — Medication 1 GRAM(S): at 18:05

## 2024-03-19 RX ADMIN — Medication 125 MILLIGRAM(S): at 14:00

## 2024-03-19 RX ADMIN — QUETIAPINE FUMARATE 50 MILLIGRAM(S): 200 TABLET, FILM COATED ORAL at 21:08

## 2024-03-19 RX ADMIN — Medication 1 DROP(S): at 18:06

## 2024-03-19 RX ADMIN — Medication 125 MILLIGRAM(S): at 21:09

## 2024-03-19 RX ADMIN — LEVETIRACETAM 250 MILLIGRAM(S): 250 TABLET, FILM COATED ORAL at 06:20

## 2024-03-19 RX ADMIN — Medication 2 MILLIGRAM(S): at 21:11

## 2024-03-19 RX ADMIN — HEPARIN SODIUM 5000 UNIT(S): 5000 INJECTION INTRAVENOUS; SUBCUTANEOUS at 06:21

## 2024-03-19 RX ADMIN — CHLORHEXIDINE GLUCONATE 15 MILLILITER(S): 213 SOLUTION TOPICAL at 18:08

## 2024-03-19 RX ADMIN — Medication 20 MILLIGRAM(S): at 06:21

## 2024-03-19 RX ADMIN — Medication 3 MILLILITER(S): at 19:14

## 2024-03-19 RX ADMIN — CARVEDILOL PHOSPHATE 6.25 MILLIGRAM(S): 80 CAPSULE, EXTENDED RELEASE ORAL at 06:19

## 2024-03-19 RX ADMIN — ESCITALOPRAM OXALATE 10 MILLIGRAM(S): 10 TABLET, FILM COATED ORAL at 11:11

## 2024-03-19 RX ADMIN — PANTOPRAZOLE SODIUM 40 MILLIGRAM(S): 20 TABLET, DELAYED RELEASE ORAL at 18:06

## 2024-03-19 RX ADMIN — SENNA PLUS 10 MILLILITER(S): 8.6 TABLET ORAL at 21:10

## 2024-03-19 RX ADMIN — Medication 125 MILLIGRAM(S): at 06:20

## 2024-03-19 NOTE — PROGRESS NOTE ADULT - ASSESSMENT
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 Course ultimately complicated by progressive WOB and O2 demand and patient intubated and transferred to MICU (1/22). Course further complicated by acute cholecystitis and s/p Perc Lynsey with IR on (1/26), HFrEF 38, pulmonary edema, superimposed PNA, failed extubation failed and prolonged vent time and s/p trach (2/13). Patient transferred to RCU (2/16) and s/p PEG (2/20). Course complicated by volume overload and diuresis and GDMT continued and HFrEF 45 with improvement    NEUROLOGY   # Encephalopathy   - Hx of CVA with no residual deficits per family, however per family worsening confusion at home over the past 6 months (becoming disoriented to time) but prior to admission has been more confused, talking about seeing people that are not present.  - CTH (1/31) with no evidence of acute infarct  - Continue on ASA and Brilinta  - Holding Neurontin, Memantine and Oxycodone in setting of somnolence  3/17: +obtuneded overnight, CT Head: Mild chronic microvascular changes without evidence of an acute transcortical infarction or hemorrhage.  - VBG and Ammonia level grossly unremarkable  - Much awake and alert in the afternoon, agitated, will consider Seroquel/low dose Ativan if needed   - 3/18 agitated in am     # ICA stenosis   - CTA NECK (1/31) with moderate to severe narrowing left internal carotid at the origin. Severe narrowing right internal carotid by a tandem lesion 1.5 cm above the bifurcation with a narrowed internal carotid beyond the lesion. Extensive calcified plaque both cavernous and supraclinoid carotid arteries with narrowing but no occlusion. Mild narrowing proximal right M1 segment. Moderate narrowing both vertebral V4 segments. No perfusion abnormality at the Tmax 6 second threshold, but moderate hypoperfusion in the right MCA territory at the 4 second threshold.   - Continue on ASA and Brilinta    # Myoclonic jerks   - Hx of myoclonic jerks post CVAs   - EEG (1/18-2/6) negative for seizures  - Continue on Keppra and per neuro/ psych wishes to wean, family in agreement   - Currently down titrating Keppra 500 mg BID, now Keppra 250 mg BID 3/13 - )    # Depression/ Insomnia  - Continue on Lexapro per home medication   - Course complicated by agitation and pulling on tubes   - Home Seroquel discontinued as patient now on Valproic acid syrup   - S/p Ativan 0.5mg PO Q6H PRN and Haldol 0.25 mg Q6H for severe agitation, then s/p IM Zypexa 1.25 Q8H PRN for agitation    - Supportive care   - s/p Seroquel 12.5 x 1 w no significant improvement, still agitated, then Ativan 0.25mg x1   - Will monitor closely    # Agitation in setting of delirium / underlying vascular dementia   - BH following, c/w Depakene 125mg q8hr  - monitor platelets, LFTs while on Depakote   - C/w VPA TID (250mg QAM, 125mg afternoon, 375mg QHS)  - Recently, Pt had responded much better to Ativan than Zyprexa for agitation, however became obtunded, so Ativan d/c Ativan 0.5mg Q6H PRN  - FU by  today still requires CO /hand holding to prevent trach manipulation   - Start seroquel bid 25mg/50mg     CARDIOLOGY   # Vasoplegic vs septic shock  # Hx of HTN   - Weaned off pressors in ICU and now with mild hypertension   - Continue on coreg and hydral as below   - Strict BP control given moderate AS  - Increased vascular congestion on CXR (3/9) so will give additional Lasix 20mg PO x1 (3/10)    # AFIB RVR   # Bifascicular Block with Medtronic PPM   - AFIB RVR episodes and PPM interrogated (2/20) by EP with similar episodes  - Previous admission in 6/2022 with cardiogenic syncope second to bifasicular block, however now with PPM and AV myron blockers ok.   - Continue on lopressor 12.5mg BID however replaced with coreg second to HFrEF   - AC discussed at length however with recent GIB will hold for now     # HFrEF 38 likely stress induced?   # Mild to moderate AS   - ECHO (12/2023) with EF 62 with normal LVSF and mild to moderate AS   - ECHO (2/2024) with EF 38, moderate LVSD with global LV hypokinesis, mildly reduced RVSF (TAPSE 1.3), mild to moderate MR, mild AR, and moderate AS.   - RPT ECHO (3/4) with EF 45 and mild to moderate LVSD   - Continue on Lasix 20 QD, coreg 6.25 and hydralazine 50 (increased 3/4)   - Hold isordil and up titrate above medications first     # CAD with CABG   - CATH (5/2022) with dLAD 70, SVG graft to OM1 with 90 in stent stenosis s/p PCI and GEMMA placement, and LIMA graft to mLAD with no disease.   - Continue on ASA and Brilinta    # Prolonged QTC (Resolved)  - ECG (3/2) with QTC 616ms (corrected using bazzet 490ms)   - ECG (3/3) with QTC 634ms (corrected using bazzet 490ms)   - ECG (3/11) with QTC 490ms   - EKG (3/15) QTc 498ms  - Monitor off/ avoid QTC prolonging agents for now      RESPIRATORY   # Acute respiratory failure second to SARS-COV-2 vs pulm edema vs PNA  - Presented with COVID complicated by sepsis second to acute lynsey and worsening respiratory failure second to pulmonary edema requiring intubation.   - Prolonged intubation and s/p tracheostomy (2/13)   - CT CHEST 1/16 with new small bilateral pleural effusions/ atelectasis/ patchy bilateral ground-glass opacities are consistent with pulmonary edema.  - Completed ABX and COVID regimen as below   - Continue on vent and allow SBT as tolerated   - Continue on nebs and hold further HTS given intermittent hemoptysis  - Continue on diuresis as above    # Mild hemoptysis likely from suction trauma   - s/p bronch (2/18) with no active bleed  - Hemoptysis improved with TXA and holding further HTS as above   - Tiny hemoptysis second to suction trauma imporving  - Careful with suctioning   - Recurrent hemoptysis (3/9) so ordered for TXA nebs again however hemoptysis appeared self limited and stopped before nebs even given    HEENT   # L eye subconjunctival hemorrhage   - Continue on artifical tears and Lacrilube   - Optho eval appreciated     GI  # Nutrition/ Dysphagia   - PEG placed by GI on 2/20 and tube feeds continued.   - Loose stools and Banatrol continued     # UGIB   - EGD (1/2) with esophagitis and bleeding Dieulafoy's lesion s/p clips.   - Continue on PPI and carafate     # SMA calcification   - CTA demonstrating mesenteric fat stranding associated with ascending/transverse colon  - Diagnostic laparoscopy (12/24) with small bowel and visible colon viable, some inflammation of omentum in RUQ  - SMA Angiogram and stent placed (12/25).   - Continue on ASA and Brilinta     # Acute Cholecystitis   - CT (12/26) with distended GB with cholelithiasis and sludge   - HIDA (12/29) POSITIVE for acute cholecystitis   - Case discussed with IR at length and s/p PERC LYNSEY (1/26)   - Completed zosyn course (12/24-12/31)   - PERC LYNSEY tube to stay in until surgical candidate for cholecystectomy to prevent recurrence     / RENAL   # CKD   - CRE at baseline   - Monitor renal function and UOP   - Aranesp SQ x1 (3/8)    # Hypernatremia (resolved)   - s/p  Q4H  - Monitor closely with diuresis as above   - Stop FWF (3/15) given c/f worsening vol overload     INFECTIOUS DISEASE   # COVID with superimposed PNA   # ESBL Kleb PNA   - COVID POSITIVE (12/23) and completed remdesivir x 1 dose and paxlovid course.   - WOB worsened as above requiring intubation and cultures negative. Zosyn completed empirically however hypothermic (2/11) and BCx, UA, RVP and BAL negative.   - Completed additional zosyn (2/12-2/19) empirically   - Fever spike and thick secretions and SCX (2/26) with ESBL klebs.   - s/p Zosyn (2/27 - 2/29) but resistant and completed Erta (2/29 - 3/6)     HEME  # AOCD   - Monitor HH   - DVT PPX with HSQ     ENDOCRINE   # DM2 A1C 9.3   - Continue on Lantus 14 and ISS     SKIN/ TUBES   - Trach (2/13)   - PEG (2/20)   - PERC LYNSEY (1/26 - )     ETHICS   - FULL CODE     DISPO - vent facility and family aware. SW with accepting facility however pending available bed.  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 Course ultimately complicated by progressive WOB and O2 demand and patient intubated and transferred to MICU (1/22). Course further complicated by acute cholecystitis and s/p Perc Lynsey with IR on (1/26), HFrEF 38, pulmonary edema, superimposed PNA, failed extubation failed and prolonged vent time and s/p trach (2/13). Patient transferred to RCU (2/16) and s/p PEG (2/20). Course complicated by volume overload and diuresis and GDMT continued and HFrEF 45 with improvement    NEUROLOGY   # Encephalopathy   - Hx of CVA with no residual deficits per family, however per family worsening confusion at home over the past 6 months (becoming disoriented to time) but prior to admission has been more confused, talking about seeing people that are not present.  - CTH (1/31) with no evidence of acute infarct  - Continue on ASA and Brilinta  - Holding Neurontin, Memantine and Oxycodone in setting of somnolence  3/17: +obtuneded overnight, CT Head: Mild chronic microvascular changes without evidence of an acute transcortical infarction or hemorrhage.  - VBG and Ammonia level grossly unremarkable  - Much awake and alert in the afternoon, agitated, will consider Seroquel/low dose Ativan if needed   - 3/18 agitated in am seen by  and bid seroquel with good results    # ICA stenosis   - CTA NECK (1/31) with moderate to severe narrowing left internal carotid at the origin. Severe narrowing right internal carotid by a tandem lesion 1.5 cm above the bifurcation with a narrowed internal carotid beyond the lesion. Extensive calcified plaque both cavernous and supraclinoid carotid arteries with narrowing but no occlusion. Mild narrowing proximal right M1 segment. Moderate narrowing both vertebral V4 segments. No perfusion abnormality at the Tmax 6 second threshold, but moderate hypoperfusion in the right MCA territory at the 4 second threshold.   - Continue on ASA and Brilinta    # Myoclonic jerks   - Hx of myoclonic jerks post CVAs   - EEG (1/18-2/6) negative for seizures  - Continue on Keppra and per neuro/ psych wishes to wean, family in agreement   - Currently down titrating Keppra 500 mg BID, now Keppra 250 mg BID 3/13 - )    # Depression/ Insomnia  - Continue on Lexapro per home medication   - Course complicated by agitation and pulling on tubes   - Home Seroquel discontinued as patient now on Valproic acid syrup   - S/p Ativan 0.5mg PO Q6H PRN and Haldol 0.25 mg Q6H for severe agitation, then s/p IM Zypexa 1.25 Q8H PRN for agitation    - Supportive care   - s/p Seroquel 12.5 x 1 w no significant improvement, still agitated, then Ativan 0.25mg x1   - Will monitor closely    # Agitation in setting of delirium / underlying vascular dementia   - BH following, c/w Depakene 125mg q8hr  - monitor platelets, LFTs while on Depakote   - C/w VPA TID (250mg QAM, 125mg afternoon, 375mg QHS)  - Recently, Pt had responded much better to Ativan than Zyprexa for agitation, however became obtunded, so Ativan d/c Ativan 0.5mg Q6H PRN  - FU by  today still requires CO /hand holding to prevent trach manipulation   - Start seroquel bid 25mg/50mg     CARDIOLOGY   # Vasoplegic vs septic shock  # Hx of HTN   - Weaned off pressors in ICU and now with mild hypertension   - Continue on coreg and hydral as below   - Strict BP control given moderate AS  - Increased vascular congestion on CXR (3/9) so will give additional Lasix 20mg PO x1 (3/10)    # AFIB RVR   # Bifascicular Block with Medtronic PPM   - AFIB RVR episodes and PPM interrogated (2/20) by EP with similar episodes  - Previous admission in 6/2022 with cardiogenic syncope second to bifasicular block, however now with PPM and AV myron blockers ok.   - Continue on lopressor 12.5mg BID however replaced with coreg second to HFrEF   - AC discussed at length however with recent GIB will hold for now     # HFrEF 38 likely stress induced?   # Mild to moderate AS   - ECHO (12/2023) with EF 62 with normal LVSF and mild to moderate AS   - ECHO (2/2024) with EF 38, moderate LVSD with global LV hypokinesis, mildly reduced RVSF (TAPSE 1.3), mild to moderate MR, mild AR, and moderate AS.   - RPT ECHO (3/4) with EF 45 and mild to moderate LVSD   - Continue on Lasix 20 QD, coreg 6.25 and hydralazine 50 (increased 3/4)   - Hold isordil and up titrate above medications first     # CAD with CABG   - CATH (5/2022) with dLAD 70, SVG graft to OM1 with 90 in stent stenosis s/p PCI and GEMMA placement, and LIMA graft to mLAD with no disease.   - Continue on ASA and Brilinta    # Prolonged QTC (Resolved)  - ECG (3/2) with QTC 616ms (corrected using bazzet 490ms)   - ECG (3/3) with QTC 634ms (corrected using bazzet 490ms)   - ECG (3/11) with QTC 490ms   - EKG (3/15) QTc 498ms  - Monitor off/ avoid QTC prolonging agents for now      RESPIRATORY   # Acute respiratory failure second to SARS-COV-2 vs pulm edema vs PNA  - Presented with COVID complicated by sepsis second to acute lynsey and worsening respiratory failure second to pulmonary edema requiring intubation.   - Prolonged intubation and s/p tracheostomy (2/13)   - CT CHEST 1/16 with new small bilateral pleural effusions/ atelectasis/ patchy bilateral ground-glass opacities are consistent with pulmonary edema.  - Completed ABX and COVID regimen as below   - Continue on vent and allow SBT as tolerated   - Continue on nebs and hold further HTS given intermittent hemoptysis  - Continue on diuresis as above    # Mild hemoptysis likely from suction trauma   - s/p bronch (2/18) with no active bleed  - Hemoptysis improved with TXA and holding further HTS as above   - Tiny hemoptysis second to suction trauma imporving  - Careful with suctioning   - Recurrent hemoptysis (3/9) so ordered for TXA nebs again however hemoptysis appeared self limited and stopped before nebs even given    HEENT   # L eye subconjunctival hemorrhage   - Continue on artifical tears and Lacrilube   - Optho eval appreciated     GI  # Nutrition/ Dysphagia   - PEG placed by GI on 2/20 and tube feeds continued.   - Loose stools and Banatrol continued     # UGIB   - EGD (1/2) with esophagitis and bleeding Dieulafoy's lesion s/p clips.   - Continue on PPI and carafate     # SMA calcification   - CTA demonstrating mesenteric fat stranding associated with ascending/transverse colon  - Diagnostic laparoscopy (12/24) with small bowel and visible colon viable, some inflammation of omentum in RUQ  - SMA Angiogram and stent placed (12/25).   - Continue on ASA and Brilinta     # Acute Cholecystitis   - CT (12/26) with distended GB with cholelithiasis and sludge   - HIDA (12/29) POSITIVE for acute cholecystitis   - Case discussed with IR at length and s/p PERC LYNSEY (1/26)   - Completed zosyn course (12/24-12/31)   - PERC LYNSEY tube to stay in until surgical candidate for cholecystectomy to prevent recurrence     / RENAL   # CKD   - CRE at baseline   - Monitor renal function and UOP   - Aranesp SQ x1 (3/8)    # Hypernatremia (resolved)   - s/p  Q4H  - Monitor closely with diuresis as above   - Stop FWF (3/15) given c/f worsening vol overload     INFECTIOUS DISEASE   # COVID with superimposed PNA   # ESBL Kleb PNA   - COVID POSITIVE (12/23) and completed remdesivir x 1 dose and paxlovid course.   - WOB worsened as above requiring intubation and cultures negative. Zosyn completed empirically however hypothermic (2/11) and BCx, UA, RVP and BAL negative.   - Completed additional zosyn (2/12-2/19) empirically   - Fever spike and thick secretions and SCX (2/26) with ESBL klebs.   - s/p Zosyn (2/27 - 2/29) but resistant and completed Erta (2/29 - 3/6)     HEME  # AOCD   - Monitor HH   - DVT PPX with HSQ     ENDOCRINE   # DM2 A1C 9.3   - Continue on Lantus 14 and ISS     SKIN/ TUBES   - Trach (2/13)   - PEG (2/20)   - PERC LYNSEY (1/26 - )     ETHICS   - FULL CODE     DISPO - vent facility and family aware. SW with accepting facility however pending available bed.

## 2024-03-19 NOTE — BH CONSULTATION LIAISON PROGRESS NOTE - NSBHATTESTCOMMENTATTENDFT_PSY_A_CORE
Chart reviewed. Pt seen w/ resident. Sleeping. Did not choose to wake him Exam limited. Agree with assessment/recs and will continue to follow
I personally saw and examined the patient this late AM. Patient's son at bedside. Patient alert, intermittently tracks however does not follow any commands. Does smile at one point. He was calm during entire encounter.  EKG from today ,   HR 88    Agree with starting antipsychotics as per above which can help with agitation. Agitation continues to be likely related to delirium. I also suspect that patient is perhaps more agitated when family is not around as he is likely more anxious and scared and has poor understanding of why he needs the trach (2/2 to being delirious). Therefore, the hope is that adding seroquel can help mitigate some of this anxiety.  Please continue to check daily EKGs given recent prologned QTC and using QTC prolonging agents to help control agitation.
I saw and examined the patient this AM. He is sleeping nicely when I saw him. I spoke with sons to go over morning plan.  Of note, I did not apprecaite as much EPS this AM.    Ammonia level elevated this afternoon. Would therefore decrease depakote to 125mg TID and repeat ammonia level tomorrow while treating hyperammonemia.  See plan per above.  Would avoid zyprexa as primary team did not feel that it helped, and would avoid haldol as it appeared to worsen EPS.  Patient does seem to calm down with ativan per team. May need to consider very low dose klonopin, especially if unable to use depakote.    Please do not hesitate to call with questions.
Chart reviewed. Seen with resident. Impulsive, trying to reach trach during interview but family would stop him. Exam otherwise limited. Agree with above assessment/recs. Will continue to follow.
I agree with Dr. Malone's A/P per above.  In addition, I personally saw the patient this AM. He is sleepy but awakens to voice. He is psychomotorly retarded. Has increased EPS in LUE which seems new.  Spoke with son's about plan (including neurologist son).    Agree with adding VPA dose per above and switching prn from haldol to zyprexa so long as QTC is < 500.

## 2024-03-19 NOTE — PROGRESS NOTE ADULT - MENTAL STATUS
AAO x 0, opens eyes spontaneously, does not follow commands.
Eyes open /tracking   Did not answer questions with family translating
Eyes open/some tracking
AAO x 0, eyes open spontaneously, moving extremities freely, speaking with low phonation but per son at bedside very delirious
Eyes open and tracking   No pulling at tubes
Pt awake eyes open tracking

## 2024-03-19 NOTE — PROGRESS NOTE ADULT - NS ATTEND AMEND GEN_ALL_CORE FT
89 yo M w/ PMH of CAD sp CABG, bifascicular block sp PPM, HTN, CKD, CVA,     Neuro: hx of CVA, encephalopathy. Currently awake, periods of intermittent agitation, on 1:1. On Depakote 125 q8h - weaning off, Ativan 0.25 IV q6h prn for agitation.  Keppra being tapered as well - now on 250 q12h. Appreciate behavioral health input.  Seroquel resumed with good effect, pt calm but awakens to voice    CVS:  CAD, Afib, HFrEF - TTE 45% 3/4, coreg, hydralazine, not on AC due to GIB, lasix 20 mg daily, on ASA and Brillinta   GI: on PPI and carafate  Resp: on VCAC - trials of PS daily.  ID: off antibiotics, completed etrapenem on 3/6, recently treated w/ Klebsiella PNA.   rest of plan as above

## 2024-03-19 NOTE — PROGRESS NOTE ADULT - SUBJECTIVE AND OBJECTIVE BOX
CHIEF COMPLAINT: Patient is a 89y old  Male who presents with a chief complaint of COVID19, Chest pain (18 Mar 2024 15:35)      Interval Events: No acute overnight events     REVIEW OF SYSTEMS:  [ x] Unable to assess ROS because AMS    Mode: AC/ CMV (Assist Control/ Continuous Mandatory Ventilation), RR (machine): 16, TV (machine): 400, FiO2: 30, PEEP: 5, ITime: 0.76, MAP: 10, PIP: 29      OBJECTIVE:  ICU Vital Signs Last 24 Hrs  T(C): 36.5 (19 Mar 2024 04:00), Max: 36.9 (18 Mar 2024 08:00)  T(F): 97.7 (19 Mar 2024 04:00), Max: 98.4 (18 Mar 2024 08:00)  HR: 85 (19 Mar 2024 04:00) (79 - 95)  BP: 132/53 (19 Mar 2024 04:00) (101/43 - 139/55)  BP(mean): --  ABP: --  ABP(mean): --  RR: 16 (19 Mar 2024 04:00) (16 - 24)  SpO2: 99% (19 Mar 2024 04:00) (96% - 100%)    O2 Parameters below as of 19 Mar 2024 04:00  Patient On (Oxygen Delivery Method): ventilator    O2 Concentration (%): 30      Mode: AC/ CMV (Assist Control/ Continuous Mandatory Ventilation), RR (machine): 16, TV (machine): 400, FiO2: 30, PEEP: 5, ITime: 0.76, MAP: 10, PIP: 29    03-17 @ 07:01 - 03-18 @ 07:00  --------------------------------------------------------  IN: 1200 mL / OUT: 1515 mL / NET: -315 mL    03-18 @ 07:01 - 03-19 @ 06:53  --------------------------------------------------------  IN: 750 mL / OUT: 610 mL / NET: 140 mL      CAPILLARY BLOOD GLUCOSE      POCT Blood Glucose.: 130 mg/dL (19 Mar 2024 05:50)      HOSPITAL MEDICATIONS:  MEDICATIONS  (STANDING):  albuterol/ipratropium for Nebulization 3 milliLiter(s) Nebulizer every 12 hours  artificial  tears Solution 1 Drop(s) Left EYE four times a day  aspirin  chewable 81 milliGRAM(s) Enteral Tube daily  carvedilol 6.25 milliGRAM(s) Oral every 12 hours  chlorhexidine 0.12% Liquid 15 milliLiter(s) Oral Mucosa every 12 hours  chlorhexidine 2% Cloths 1 Application(s) Topical daily  darbepoetin Injectable ViaL 60 MICROGram(s) SubCutaneous once  dextrose 5%. 1000 milliLiter(s) (100 mL/Hr) IV Continuous <Continuous>  dextrose 5%. 1000 milliLiter(s) (50 mL/Hr) IV Continuous <Continuous>  dextrose 50% Injectable 25 Gram(s) IV Push once  dextrose 50% Injectable 12.5 Gram(s) IV Push once  dextrose 50% Injectable 25 Gram(s) IV Push once  doxazosin 2 milliGRAM(s) Oral at bedtime  escitalopram Solution 10 milliGRAM(s) Oral daily  furosemide    Tablet 20 milliGRAM(s) Oral daily  glucagon  Injectable 1 milliGRAM(s) IntraMuscular once  heparin   Injectable 5000 Unit(s) SubCutaneous every 8 hours  hydrALAZINE 25 milliGRAM(s) Oral every 8 hours  insulin glargine Injectable (LANTUS) 14 Unit(s) SubCutaneous <User Schedule>  insulin lispro (ADMELOG) corrective regimen sliding scale   SubCutaneous every 6 hours  levETIRAcetam  Solution 250 milliGRAM(s) Oral two times a day  melatonin 6 milliGRAM(s) Oral at bedtime  pantoprazole   Suspension 40 milliGRAM(s) Oral every 12 hours  petrolatum Ophthalmic Ointment 1 Application(s) Left EYE at bedtime  polyethylene glycol 3350 17 Gram(s) Oral daily  QUEtiapine 25 milliGRAM(s) Oral <User Schedule>  QUEtiapine 50 milliGRAM(s) Oral at bedtime  senna Syrup 10 milliLiter(s) Oral at bedtime  sucralfate suspension 1 Gram(s) Enteral Tube two times a day  ticagrelor 60 milliGRAM(s) Oral every 12 hours  valproic  acid Syrup 125 milliGRAM(s) Oral every 8 hours    MEDICATIONS  (PRN):  acetaminophen   Oral Liquid .. 650 milliGRAM(s) Oral every 6 hours PRN Temp greater or equal to 38C (100.4F), Mild Pain (1 - 3), Moderate Pain (4 - 6)  dextrose Oral Gel 15 Gram(s) Oral once PRN Blood Glucose LESS THAN 70 milliGRAM(s)/deciliter      LABS:                        8.7    5.22  )-----------( 278      ( 18 Mar 2024 05:09 )             28.0     03-18    144  |  102  |  40<H>  ----------------------------<  105<H>  4.5   |  29  |  1.25    Ca    8.8      18 Mar 2024 05:09  Phos  3.2     03-18  Mg     2.30     03-18        Urinalysis Basic - ( 18 Mar 2024 05:09 )    Color: x / Appearance: x / SG: x / pH: x  Gluc: 105 mg/dL / Ketone: x  / Bili: x / Urobili: x   Blood: x / Protein: x / Nitrite: x   Leuk Esterase: x / RBC: x / WBC x   Sq Epi: x / Non Sq Epi: x / Bacteria: x            PAST MEDICAL & SURGICAL HISTORY:  Hyperlipemia      Hypertension      Coronary Artery Disease      Diabetes Mellitus Type II      Stented Coronary Artery  total 5 stents, last stent 5/2019      Neuropathy      Myocardial infarction      Stroke  mild left facial numbness   no other residuals verbalized      Myoclonic jerking      Stage 3 chronic kidney disease      History of Cataract Extraction      Hx of CABG      H/O coronary angiogram      S/P coronary artery stent placement  1/6/09      S/P placement of cardiac pacemaker          FAMILY HISTORY:  No pertinent family history in first degree relatives        Social History:  Lives with family  Ambulates with walker  Denies tobacco, EtOH, or illicit substance use (23 Dec 2023 22:51)      RADIOLOGY:  [ ] Reviewed and interpreted by me    PULMONARY FUNCTION TESTS:    EKG:

## 2024-03-19 NOTE — PROGRESS NOTE ADULT - ASSESSMENT
IMPRESSION:  89M w/ DM2, HTN, CVA, PAD, CKD3, and CAD-CABG, 12/23/23 p/w COVID19 infection, c/b respiratory failure/hypotension; now s/p tracheostomy    (1)Renal - CKD3; superimposed mild prerenal azotemia - numbers fluctuating based on hemodynamic status, slightly higher   (2)CV - now off pressors and midodrine, BP improved/stable    (3)Pulm - vent-dependent   (4)ID - afebrile, s/p zosyn   (5)GI - s/p PEG (2/20)  (6)Hypernatremia - possibly due to diuresis, Na+ improved    RECOMMEND:   (1)a/w lasix 20 mg iv  PRN   (2)flush PEG as needed   (3)Continue meds for GFR 40-50ml/min  (4)give  aranesp 60 mcg Sq x 1 (orderd yesterday )          [facility] [place]  Office: [phone]    [facility] [place]  Office: [phone]

## 2024-03-19 NOTE — PROGRESS NOTE ADULT - SUBJECTIVE AND OBJECTIVE BOX
NEPHROLOGY     Patient seen and examined with staff at bedside, pt trach to vent, no acute distress.   still agitated 1:1  MEDICATIONS  (STANDING):  albuterol/ipratropium for Nebulization 3 milliLiter(s) Nebulizer every 12 hours  artificial  tears Solution 1 Drop(s) Left EYE four times a day  aspirin  chewable 81 milliGRAM(s) Enteral Tube daily  carvedilol 6.25 milliGRAM(s) Oral every 12 hours  chlorhexidine 0.12% Liquid 15 milliLiter(s) Oral Mucosa every 12 hours  chlorhexidine 2% Cloths 1 Application(s) Topical daily  dextrose 5%. 1000 milliLiter(s) (100 mL/Hr) IV Continuous <Continuous>  dextrose 5%. 1000 milliLiter(s) (50 mL/Hr) IV Continuous <Continuous>  dextrose 50% Injectable 25 Gram(s) IV Push once  dextrose 50% Injectable 25 Gram(s) IV Push once  dextrose 50% Injectable 12.5 Gram(s) IV Push once  divalproex Sprinkle 125 milliGRAM(s) Oral two times a day  doxazosin 2 milliGRAM(s) Oral at bedtime  escitalopram Solution 10 milliGRAM(s) Oral daily  furosemide    Tablet 20 milliGRAM(s) Oral daily  glucagon  Injectable 1 milliGRAM(s) IntraMuscular once  heparin   Injectable 5000 Unit(s) SubCutaneous every 8 hours  hydrALAZINE 50 milliGRAM(s) Oral every 8 hours  insulin glargine Injectable (LANTUS) 14 Unit(s) SubCutaneous <User Schedule>  insulin lispro (ADMELOG) corrective regimen sliding scale   SubCutaneous every 6 hours  levETIRAcetam  Solution 500 milliGRAM(s) Oral two times a day  melatonin 6 milliGRAM(s) Oral at bedtime  pantoprazole   Suspension 40 milliGRAM(s) Oral every 12 hours  petrolatum Ophthalmic Ointment 1 Application(s) Left EYE at bedtime  polyethylene glycol 3350 17 Gram(s) Oral daily  senna Syrup 10 milliLiter(s) Oral at bedtime  sucralfate suspension 1 Gram(s) Enteral Tube two times a day  ticagrelor 60 milliGRAM(s) Oral every 12 hours    VITALS:  T(C): , Max: 37 (03-11-24 @ 11:51)  T(F): , Max: 98.6 (03-11-24 @ 11:51)  HR: 95 (03-12-24 @ 11:30)  BP: 113/52 (03-12-24 @ 09:00)  BP(mean): 57 (03-12-24 @ 09:00)  RR: 18 (03-12-24 @ 09:00)  SpO2: 97% (03-12-24 @ 11:30)  Wt(kg): --  I and O's:    03-11 @ 07:01  -  03-12 @ 07:00  --------------------------------------------------------  IN: 1450 mL / OUT: 20 mL / NET: 1430 mL    PHYSICAL EXAM:  Constitutional: trach to vent   HEENT: +trach  Respiratory: coarse bs   Cardiovascular: normal s1s2  Gastrointestinal: BS+, soft, NT/ND +PEG  Extremities:+ peripheral edema  : + external catheter   Skin: No rashes    LABS:                        8.0    8.32  )-----------( 340      ( 12 Mar 2024 05:30 )             25.5     03-12    137  |  97<L>  |  50<H>  ----------------------------<  190<H>  4.7   |  30  |  1.50<H>    Ca    9.0      12 Mar 2024 05:30  Phos  3.8     03-12  Mg     2.50     03-12    TPro  7.2  /  Alb  3.0<L>  /  TBili  0.3  /  DBili  <0.2  /  AST  22  /  ALT  19  /  AlkPhos  176<H>  03-12      Urine Studies:  Urinalysis Basic - ( 12 Mar 2024 05:30 )    Color: x / Appearance: x / SG: x / pH: x  Gluc: 190 mg/dL / Ketone: x  / Bili: x / Urobili: x   Blood: x / Protein: x / Nitrite: x   Leuk Esterase: x / RBC: x / WBC x   Sq Epi: x / Non Sq Epi: x / Bacteria: x    RADIOLOGY & ADDITIONAL STUDIES:    rad< from: US Duplex Venous Lower Ext Complete, Bilateral (03.11.24 @ 19:29) >  IMPRESSION:  No evidence of deep venous thrombosis in either lower extremity.    Increased venous pulsatility suggestive of right-sided cardiac   dysfunction.        --- End of Report ---      ADEBAYO PALACIO MD; Attending Radiologist  This document has been electronically signed. Mar 11 2024  7:37PM    < end of copied text >   NEPHROLOGY     Patient seen and examined with staff at bedside, pt lin loya, no acute distress.   on  1:1 for agitations  family at bedside    MEDICATIONS  (STANDING):  albuterol/ipratropium for Nebulization 3 milliLiter(s) Nebulizer every 12 hours  artificial  tears Solution 1 Drop(s) Left EYE four times a day  aspirin  chewable 81 milliGRAM(s) Enteral Tube daily  carvedilol 6.25 milliGRAM(s) Oral every 12 hours  chlorhexidine 0.12% Liquid 15 milliLiter(s) Oral Mucosa every 12 hours  chlorhexidine 2% Cloths 1 Application(s) Topical daily  dextrose 5%. 1000 milliLiter(s) (100 mL/Hr) IV Continuous <Continuous>  dextrose 5%. 1000 milliLiter(s) (50 mL/Hr) IV Continuous <Continuous>  dextrose 50% Injectable 25 Gram(s) IV Push once  dextrose 50% Injectable 25 Gram(s) IV Push once  dextrose 50% Injectable 12.5 Gram(s) IV Push once  divalproex Sprinkle 125 milliGRAM(s) Oral two times a day  doxazosin 2 milliGRAM(s) Oral at bedtime  escitalopram Solution 10 milliGRAM(s) Oral daily  furosemide    Tablet 20 milliGRAM(s) Oral daily  glucagon  Injectable 1 milliGRAM(s) IntraMuscular once  heparin   Injectable 5000 Unit(s) SubCutaneous every 8 hours  hydrALAZINE 50 milliGRAM(s) Oral every 8 hours  insulin glargine Injectable (LANTUS) 14 Unit(s) SubCutaneous <User Schedule>  insulin lispro (ADMELOG) corrective regimen sliding scale   SubCutaneous every 6 hours  levETIRAcetam  Solution 500 milliGRAM(s) Oral two times a day  melatonin 6 milliGRAM(s) Oral at bedtime  pantoprazole   Suspension 40 milliGRAM(s) Oral every 12 hours  petrolatum Ophthalmic Ointment 1 Application(s) Left EYE at bedtime  polyethylene glycol 3350 17 Gram(s) Oral daily  senna Syrup 10 milliLiter(s) Oral at bedtime  sucralfate suspension 1 Gram(s) Enteral Tube two times a day  ticagrelor 60 milliGRAM(s) Oral every 12 hours    VITALS:  T(C): , Max: 37 (03-11-24 @ 11:51)  T(F): , Max: 98.6 (03-11-24 @ 11:51)  HR: 95 (03-12-24 @ 11:30)  BP: 113/52 (03-12-24 @ 09:00)  BP(mean): 57 (03-12-24 @ 09:00)  RR: 18 (03-12-24 @ 09:00)  SpO2: 97% (03-12-24 @ 11:30)  Wt(kg): --  I and O's:    03-11 @ 07:01  -  03-12 @ 07:00  --------------------------------------------------------  IN: 1450 mL / OUT: 20 mL / NET: 1430 mL    PHYSICAL EXAM:  Constitutional: trach to vent   HEENT: +trach  Respiratory: coarse bs   Cardiovascular: normal s1s2  Gastrointestinal: BS+, soft, NT/ND +PEG  Extremities:+ peripheral edema  : + external catheter   Skin: No rashes    LABS:                        8.0    8.32  )-----------( 340      ( 12 Mar 2024 05:30 )             25.5     03-12    137  |  97<L>  |  50<H>  ----------------------------<  190<H>  4.7   |  30  |  1.50<H>    Ca    9.0      12 Mar 2024 05:30  Phos  3.8     03-12  Mg     2.50     03-12    TPro  7.2  /  Alb  3.0<L>  /  TBili  0.3  /  DBili  <0.2  /  AST  22  /  ALT  19  /  AlkPhos  176<H>  03-12      Urine Studies:  Urinalysis Basic - ( 12 Mar 2024 05:30 )    Color: x / Appearance: x / SG: x / pH: x  Gluc: 190 mg/dL / Ketone: x  / Bili: x / Urobili: x   Blood: x / Protein: x / Nitrite: x   Leuk Esterase: x / RBC: x / WBC x   Sq Epi: x / Non Sq Epi: x / Bacteria: x    RADIOLOGY & ADDITIONAL STUDIES:    rad< from: US Duplex Venous Lower Ext Complete, Bilateral (03.11.24 @ 19:29) >  IMPRESSION:  No evidence of deep venous thrombosis in either lower extremity.    Increased venous pulsatility suggestive of right-sided cardiac   dysfunction.        --- End of Report ---      ADEBAYO PALACIO MD; Attending Radiologist  This document has been electronically signed. Mar 11 2024  7:37PM    < end of copied text >

## 2024-03-19 NOTE — PROGRESS NOTE ADULT - SUBJECTIVE AND OBJECTIVE BOX
Aashish Boyer MD  Interventional Cardiology / Advance Heart Failure and Cardiac Transplant Specialist  [place] Office : [phone] [place] [id number]  Tel:   [place] Office : [phone] [facility] [address]  Tel: [phone]      Subjective/Overnight events: Pt is lying in bed comfortable not in distress  	  MEDICATIONS:  aspirin  chewable 81 milliGRAM(s) Enteral Tube daily  carvedilol 6.25 milliGRAM(s) Oral every 12 hours  doxazosin 2 milliGRAM(s) Oral at bedtime  furosemide    Tablet 20 milliGRAM(s) Oral daily  heparin   Injectable 5000 Unit(s) SubCutaneous every 8 hours  hydrALAZINE 25 milliGRAM(s) Oral every 8 hours  ticagrelor 60 milliGRAM(s) Oral every 12 hours      albuterol/ipratropium for Nebulization 3 milliLiter(s) Nebulizer every 12 hours    acetaminophen   Oral Liquid .. 650 milliGRAM(s) Oral every 6 hours PRN  escitalopram Solution 10 milliGRAM(s) Oral daily  levETIRAcetam  Solution 250 milliGRAM(s) Oral two times a day  melatonin 6 milliGRAM(s) Oral at bedtime  QUEtiapine 25 milliGRAM(s) Oral <User Schedule>  QUEtiapine 50 milliGRAM(s) Oral at bedtime  valproic  acid Syrup 125 milliGRAM(s) Oral every 8 hours    pantoprazole   Suspension 40 milliGRAM(s) Oral every 12 hours  polyethylene glycol 3350 17 Gram(s) Oral daily  senna Syrup 10 milliLiter(s) Oral at bedtime  sucralfate suspension 1 Gram(s) Enteral Tube two times a day    dextrose 50% Injectable 25 Gram(s) IV Push once  dextrose 50% Injectable 12.5 Gram(s) IV Push once  dextrose 50% Injectable 25 Gram(s) IV Push once  dextrose Oral Gel 15 Gram(s) Oral once PRN  glucagon  Injectable 1 milliGRAM(s) IntraMuscular once  insulin glargine Injectable (LANTUS) 14 Unit(s) SubCutaneous <User Schedule>  insulin lispro (ADMELOG) corrective regimen sliding scale   SubCutaneous every 6 hours    artificial  tears Solution 1 Drop(s) Left EYE four times a day  chlorhexidine 0.12% Liquid 15 milliLiter(s) Oral Mucosa every 12 hours  chlorhexidine 2% Cloths 1 Application(s) Topical daily  darbepoetin Injectable ViaL 60 MICROGram(s) SubCutaneous once  dextrose 5%. 1000 milliLiter(s) IV Continuous <Continuous>  dextrose 5%. 1000 milliLiter(s) IV Continuous <Continuous>  petrolatum Ophthalmic Ointment 1 Application(s) Left EYE at bedtime      PAST MEDICAL/SURGICAL HISTORY  PAST MEDICAL & SURGICAL HISTORY:  Hyperlipemia      Hypertension      Coronary Artery Disease      Diabetes Mellitus Type II      Stented Coronary Artery  total 5 stents, last stent 5/2019      Neuropathy      Myocardial infarction      Stroke  mild left facial numbness   no other residuals verbalized      Myoclonic jerking      Stage 3 chronic kidney disease      History of Cataract Extraction      Hx of CABG      H/O coronary angiogram      S/P coronary artery stent placement  1/6/09      S/P placement of cardiac pacemaker          SOCIAL HISTORY: Substance Use (street drugs): ( x ) never used  (  ) other:    FAMILY HISTORY:  No pertinent family history in first degree relatives          PHYSICAL EXAM:  T(C): 36.6 (03-19-24 @ 12:00), Max: 36.7 (03-18-24 @ 18:07)  HR: 80 (03-19-24 @ 15:11) (79 - 88)  BP: 135/48 (03-19-24 @ 12:00) (106/40 - 139/55)  RR: 16 (03-19-24 @ 12:00) (16 - 20)  SpO2: 98% (03-19-24 @ 15:11) (97% - 100%)  Wt(kg): --  I&O's Summary    18 Mar 2024 07:01  -  19 Mar 2024 07:00  --------------------------------------------------------  IN: 1350 mL / OUT: 1710 mL / NET: -360 mL        GENERAL: s/p trach  EYES: conjunctiva and sclera clear  ENMT:   Moist mucous membranes, Good dentition, No lesions  Cardiovascular: Normal S1 S2, No JVD, No murmurs, No edema  Respiratory: Lungs clear to auscultation	  Gastrointestinal:  s/p PEG   Extremities: no edema                                      8.5    6.41  )-----------( 282      ( 19 Mar 2024 06:24 )             26.3     03-19    141  |  99  |  39<H>  ----------------------------<  113<H>  4.1   |  29  |  1.22    Ca    8.7      19 Mar 2024 06:24  Phos  3.5     03-19  Mg     2.20     03-19      proBNP:   Lipid Profile:   HgA1c:   TSH:     Consultant(s) Notes Reviewed:  [x ] YES  [ ] NO    Care Discussed with Consultants/Other Providers [ x] YES  [ ] NO    Imaging Personally Reviewed independently:  [x] YES  [ ] NO    All labs, radiologic studies, vitals, orders and medications list reviewed. Patient is seen and examined at bedside. Case discussed with medical team.

## 2024-03-19 NOTE — BH CONSULTATION LIAISON PROGRESS NOTE - NSBHASSESSMENTFT_PSY_ALL_CORE
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 Course ultimately complicated by progressive WOB and O2 demand and patient intubated and transferred to MICU (1/22). Course further complicated by acute cholecystitis and s/p Perc Lynsey with IR on (1/26), HFrEF 38, pulmonary edema, superimposed PNA, failed extubation failed and prolonged vent time and s/p trach (2/13). Patient transferred to RCU (2/16) and s/p PEG (2/20). Course complicated by volume overload and diuresis and GDMT continued and HFrEF 45 with improvement.  No known psychiatric history. Psychiatry consulted for agitation. He has been receiving PO and IM ativan several times per day, concern for oversedation. Son and DIL are physicians and requested consult.     Due to multiple medical comorbities and prolonged QTc, would hold off on antipsychotics and other QT prolonging agents.   Ativan is not an ideal PRN for this patient given deliriogenic effects and paradoxical activation.  Discussed with family plan to start low dose depakote for impulsivity and agitation and uptitrated as tolerated.    3/12: received ativan x2 overnight for ongoing agitation. LFTs wnl. Will continue to increase depakote and monitor. Would recommend cross tapering with keppra as keppra may be contributing to agitated delirium. Discussed with family.     3/14: some improvement in agitation with depakote, repeat EKG with QTc <500, still proceed with caution with antipsychotics, though antipsychotics are preferable to benzos for agitation in delirium    3/15: still requiring PRNs, appears to respond better to ativan than to olanzapine   - Depakote decreased to 125mg TID in response to hyperammonemia    3/18: ammonia improved, remains agitated requiring PRNs. Now that EKG has improved, can start standing seroquel for agitation and titrate as tolerated, monitor QTc.     3/19: started seroquel 25/50, no PRNs required overnight. Appears to be helping with agitation and delirium. Not obtunded on exam today.     Plan:  - continue with quetiapine 25mg @ 8am and 50mg qhs for delirium and agitation, hold for QTc > 500  - repeat EKG today  - continue with depakote 125mg TID  impulsivity and agitation  - defer taper of keppra to primary team  - delirium precautions   - monitor LFTs, platelets, ammonia, VPA level   - of note, his VPA level will need to be adjusted for low albumin- please indicate this on DC summary  - continue using ativan 0.5mg q6h PRN for severe agitation per RCU

## 2024-03-20 LAB
ALBUMIN SERPL ELPH-MCNC: 3.2 G/DL — LOW (ref 3.3–5)
ALP SERPL-CCNC: 112 U/L — SIGNIFICANT CHANGE UP (ref 40–120)
ALT FLD-CCNC: 17 U/L — SIGNIFICANT CHANGE UP (ref 4–41)
AMMONIA BLD-MCNC: 43 UMOL/L — SIGNIFICANT CHANGE UP (ref 11–55)
ANION GAP SERPL CALC-SCNC: 13 MMOL/L — SIGNIFICANT CHANGE UP (ref 7–14)
AST SERPL-CCNC: 26 U/L — SIGNIFICANT CHANGE UP (ref 4–40)
BASOPHILS # BLD AUTO: 0.04 K/UL — SIGNIFICANT CHANGE UP (ref 0–0.2)
BASOPHILS NFR BLD AUTO: 0.5 % — SIGNIFICANT CHANGE UP (ref 0–2)
BILIRUB SERPL-MCNC: 0.2 MG/DL — SIGNIFICANT CHANGE UP (ref 0.2–1.2)
BUN SERPL-MCNC: 39 MG/DL — HIGH (ref 7–23)
CALCIUM SERPL-MCNC: 8.8 MG/DL — SIGNIFICANT CHANGE UP (ref 8.4–10.5)
CHLORIDE SERPL-SCNC: 100 MMOL/L — SIGNIFICANT CHANGE UP (ref 98–107)
CO2 SERPL-SCNC: 28 MMOL/L — SIGNIFICANT CHANGE UP (ref 22–31)
CREAT SERPL-MCNC: 1.21 MG/DL — SIGNIFICANT CHANGE UP (ref 0.5–1.3)
EGFR: 57 ML/MIN/1.73M2 — LOW
EOSINOPHIL # BLD AUTO: 0.6 K/UL — HIGH (ref 0–0.5)
EOSINOPHIL NFR BLD AUTO: 8.2 % — HIGH (ref 0–6)
GLUCOSE BLDC GLUCOMTR-MCNC: 122 MG/DL — HIGH (ref 70–99)
GLUCOSE BLDC GLUCOMTR-MCNC: 127 MG/DL — HIGH (ref 70–99)
GLUCOSE BLDC GLUCOMTR-MCNC: 133 MG/DL — HIGH (ref 70–99)
GLUCOSE BLDC GLUCOMTR-MCNC: 134 MG/DL — HIGH (ref 70–99)
GLUCOSE BLDC GLUCOMTR-MCNC: 171 MG/DL — HIGH (ref 70–99)
GLUCOSE SERPL-MCNC: 110 MG/DL — HIGH (ref 70–99)
HCT VFR BLD CALC: 25.9 % — LOW (ref 39–50)
HGB BLD-MCNC: 8.3 G/DL — LOW (ref 13–17)
IANC: 3.96 K/UL — SIGNIFICANT CHANGE UP (ref 1.8–7.4)
IMM GRANULOCYTES NFR BLD AUTO: 0.3 % — SIGNIFICANT CHANGE UP (ref 0–0.9)
LYMPHOCYTES # BLD AUTO: 2.07 K/UL — SIGNIFICANT CHANGE UP (ref 1–3.3)
LYMPHOCYTES # BLD AUTO: 28.4 % — SIGNIFICANT CHANGE UP (ref 13–44)
MAGNESIUM SERPL-MCNC: 2.2 MG/DL — SIGNIFICANT CHANGE UP (ref 1.6–2.6)
MCHC RBC-ENTMCNC: 29.1 PG — SIGNIFICANT CHANGE UP (ref 27–34)
MCHC RBC-ENTMCNC: 32 GM/DL — SIGNIFICANT CHANGE UP (ref 32–36)
MCV RBC AUTO: 90.9 FL — SIGNIFICANT CHANGE UP (ref 80–100)
MONOCYTES # BLD AUTO: 0.61 K/UL — SIGNIFICANT CHANGE UP (ref 0–0.9)
MONOCYTES NFR BLD AUTO: 8.4 % — SIGNIFICANT CHANGE UP (ref 2–14)
NEUTROPHILS # BLD AUTO: 3.96 K/UL — SIGNIFICANT CHANGE UP (ref 1.8–7.4)
NEUTROPHILS NFR BLD AUTO: 54.2 % — SIGNIFICANT CHANGE UP (ref 43–77)
NRBC # BLD: 0 /100 WBCS — SIGNIFICANT CHANGE UP (ref 0–0)
NRBC # FLD: 0 K/UL — SIGNIFICANT CHANGE UP (ref 0–0)
PHOSPHATE SERPL-MCNC: 3.1 MG/DL — SIGNIFICANT CHANGE UP (ref 2.5–4.5)
PLATELET # BLD AUTO: 297 K/UL — SIGNIFICANT CHANGE UP (ref 150–400)
POTASSIUM SERPL-MCNC: 4.6 MMOL/L — SIGNIFICANT CHANGE UP (ref 3.5–5.3)
POTASSIUM SERPL-SCNC: 4.6 MMOL/L — SIGNIFICANT CHANGE UP (ref 3.5–5.3)
PROT SERPL-MCNC: 7.3 G/DL — SIGNIFICANT CHANGE UP (ref 6–8.3)
RBC # BLD: 2.85 M/UL — LOW (ref 4.2–5.8)
RBC # FLD: 15.7 % — HIGH (ref 10.3–14.5)
SODIUM SERPL-SCNC: 141 MMOL/L — SIGNIFICANT CHANGE UP (ref 135–145)
VALPROATE SERPL-MCNC: 41.7 UG/ML — LOW (ref 50–100)
WBC # BLD: 7.3 K/UL — SIGNIFICANT CHANGE UP (ref 3.8–10.5)
WBC # FLD AUTO: 7.3 K/UL — SIGNIFICANT CHANGE UP (ref 3.8–10.5)

## 2024-03-20 PROCEDURE — 93010 ELECTROCARDIOGRAM REPORT: CPT

## 2024-03-20 PROCEDURE — 99233 SBSQ HOSP IP/OBS HIGH 50: CPT | Mod: FS

## 2024-03-20 RX ORDER — QUETIAPINE FUMARATE 200 MG/1
12.5 TABLET, FILM COATED ORAL EVERY 6 HOURS
Refills: 0 | Status: DISCONTINUED | OUTPATIENT
Start: 2024-03-20 | End: 2024-03-27

## 2024-03-20 RX ORDER — QUETIAPINE FUMARATE 200 MG/1
75 TABLET, FILM COATED ORAL AT BEDTIME
Refills: 0 | Status: DISCONTINUED | OUTPATIENT
Start: 2024-03-20 | End: 2024-03-27

## 2024-03-20 RX ADMIN — Medication 125 MILLIGRAM(S): at 21:00

## 2024-03-20 RX ADMIN — Medication 3 MILLILITER(S): at 21:12

## 2024-03-20 RX ADMIN — Medication 0.25 MILLIGRAM(S): at 11:59

## 2024-03-20 RX ADMIN — Medication 1 GRAM(S): at 17:06

## 2024-03-20 RX ADMIN — HEPARIN SODIUM 5000 UNIT(S): 5000 INJECTION INTRAVENOUS; SUBCUTANEOUS at 13:29

## 2024-03-20 RX ADMIN — CARVEDILOL PHOSPHATE 6.25 MILLIGRAM(S): 80 CAPSULE, EXTENDED RELEASE ORAL at 17:08

## 2024-03-20 RX ADMIN — Medication 1 APPLICATION(S): at 21:01

## 2024-03-20 RX ADMIN — Medication 2: at 11:59

## 2024-03-20 RX ADMIN — Medication 1 DROP(S): at 00:45

## 2024-03-20 RX ADMIN — Medication 1 DROP(S): at 12:00

## 2024-03-20 RX ADMIN — PANTOPRAZOLE SODIUM 40 MILLIGRAM(S): 20 TABLET, DELAYED RELEASE ORAL at 17:08

## 2024-03-20 RX ADMIN — Medication 6 MILLIGRAM(S): at 21:01

## 2024-03-20 RX ADMIN — HEPARIN SODIUM 5000 UNIT(S): 5000 INJECTION INTRAVENOUS; SUBCUTANEOUS at 21:01

## 2024-03-20 RX ADMIN — CHLORHEXIDINE GLUCONATE 15 MILLILITER(S): 213 SOLUTION TOPICAL at 17:09

## 2024-03-20 RX ADMIN — Medication 3 MILLILITER(S): at 08:54

## 2024-03-20 RX ADMIN — HEPARIN SODIUM 5000 UNIT(S): 5000 INJECTION INTRAVENOUS; SUBCUTANEOUS at 05:57

## 2024-03-20 RX ADMIN — QUETIAPINE FUMARATE 75 MILLIGRAM(S): 200 TABLET, FILM COATED ORAL at 21:00

## 2024-03-20 RX ADMIN — PANTOPRAZOLE SODIUM 40 MILLIGRAM(S): 20 TABLET, DELAYED RELEASE ORAL at 05:57

## 2024-03-20 RX ADMIN — LEVETIRACETAM 250 MILLIGRAM(S): 250 TABLET, FILM COATED ORAL at 17:06

## 2024-03-20 RX ADMIN — QUETIAPINE FUMARATE 12.5 MILLIGRAM(S): 200 TABLET, FILM COATED ORAL at 22:57

## 2024-03-20 RX ADMIN — Medication 2 MILLIGRAM(S): at 21:00

## 2024-03-20 RX ADMIN — Medication 1 GRAM(S): at 05:56

## 2024-03-20 RX ADMIN — Medication 25 MILLIGRAM(S): at 05:57

## 2024-03-20 RX ADMIN — CHLORHEXIDINE GLUCONATE 15 MILLILITER(S): 213 SOLUTION TOPICAL at 05:58

## 2024-03-20 RX ADMIN — Medication 1 DROP(S): at 23:09

## 2024-03-20 RX ADMIN — CARVEDILOL PHOSPHATE 6.25 MILLIGRAM(S): 80 CAPSULE, EXTENDED RELEASE ORAL at 05:55

## 2024-03-20 RX ADMIN — Medication 125 MILLIGRAM(S): at 05:56

## 2024-03-20 RX ADMIN — LEVETIRACETAM 250 MILLIGRAM(S): 250 TABLET, FILM COATED ORAL at 05:56

## 2024-03-20 RX ADMIN — Medication 20 MILLIGRAM(S): at 05:58

## 2024-03-20 RX ADMIN — CHLORHEXIDINE GLUCONATE 1 APPLICATION(S): 213 SOLUTION TOPICAL at 11:59

## 2024-03-20 RX ADMIN — QUETIAPINE FUMARATE 25 MILLIGRAM(S): 200 TABLET, FILM COATED ORAL at 07:14

## 2024-03-20 RX ADMIN — Medication 81 MILLIGRAM(S): at 12:00

## 2024-03-20 RX ADMIN — ESCITALOPRAM OXALATE 10 MILLIGRAM(S): 10 TABLET, FILM COATED ORAL at 12:00

## 2024-03-20 RX ADMIN — POLYETHYLENE GLYCOL 3350 17 GRAM(S): 17 POWDER, FOR SOLUTION ORAL at 12:00

## 2024-03-20 RX ADMIN — Medication 1 DROP(S): at 05:55

## 2024-03-20 RX ADMIN — Medication 125 MILLIGRAM(S): at 13:27

## 2024-03-20 RX ADMIN — Medication 0.25 MILLIGRAM(S): at 13:26

## 2024-03-20 RX ADMIN — TICAGRELOR 60 MILLIGRAM(S): 90 TABLET ORAL at 17:07

## 2024-03-20 RX ADMIN — INSULIN GLARGINE 14 UNIT(S): 100 INJECTION, SOLUTION SUBCUTANEOUS at 11:58

## 2024-03-20 RX ADMIN — Medication 1 DROP(S): at 17:09

## 2024-03-20 RX ADMIN — TICAGRELOR 60 MILLIGRAM(S): 90 TABLET ORAL at 05:57

## 2024-03-20 RX ADMIN — SENNA PLUS 10 MILLILITER(S): 8.6 TABLET ORAL at 21:00

## 2024-03-20 NOTE — PROCEDURE NOTE - ADDITIONAL PROCEDURE DETAILS
Patient requiring PIV access for medical management. Prior to procedure, patient was properly identified using name and . Site prepped using chlorhexidine and tourniquet applied. Ultrasound was used to identify a LUE, easily compressible, no thrombus, non-pulsatile. A  20G x 5.7cm AccMasterImage 3Dth Ace Intravascular Catheter was introduced into the skin and inserted into the identified vessel. Flash seen in needle housing and placement confirmed on US. Guidewire advanced and catheter advanced over guide wire. Needle withdrawn and placement again confirmed with US visualization. Clave with saline flush attached and placement again confirmed with positive blood return/ flash. IV flushes easily without resistance, and no swelling appreciated at insertion site. Site cleaned with alcohol, Cavilon skin prep applied, and cath secured with central line dressing. Patient tolerated procedure well with no complications and no blood loss. Nursing staff informed and dressing labeled with initials, date and time. Dressing to be changed Qweekly.     ALISTAIR Thomas, PA-C  Department of Medicine/ RCU  In house RCU [place] [id number]  In house Medicine Beeper [id number]  Reachable via teams
Appropriate pacing and sensing. Capture threshold tested via iterative testing. No events noted. Had three episodes of Afib w/VR up to 130s on 2/13/24 lasting up to 1 hour.  Not on AC 2/2 GI bleed. No reprogramming done
Patient requiring PIV access for medical management. Prior to procedure, patient was properly identified using name and . Site prepped using chlorhexidine and tourniquet applied. Ultrasound was used to identify a LUE cephalic, easily compressible, no thrombus, non-pulsatile. A  20G x 10cm Powerglide Midline was introduced into the skin and inserted into the identified vessel. Flash seen in needle housing and placement confirmed on US. Guidewire advanced and catheter advanced over guide wire. Needle withdrawn and placement again confirmed with US visualization. IV extension tubing with saline flush attached and placement again confirmed with positive blood return/ flash. IV flushes easily without resistance, and no swelling appreciated at insertion site. Site cleaned with alcohol, Cavilon skin prep applied, and cath secured with central line dressing. Patient tolerated procedure well with no complications and no blood loss. Nursing staff informed and dressing labeled with initials, date and time. Dressing to be changed Qweekly.     ALISTAIR Thomas, PA-C  Department of Medicine/ RCU  In house RCU [place] [id number]  In house Medicine Beeper [id number]  Reachable via teams
Appropriate pacing and sensing.  Excellent capture threshold.   No events corresponding to this admission.  No reprogramming done    According to Samantha Sanon from [employer] ([phone]), patient device is MRI conditional for a 1.3 or 1.5T. MRI form was completed and is located in patient chart.     Device information:  Pacemaker [place] XT  MRI [id number] serial number [id number] implanted on jun/06/2022  RA model 4076 CapSureFix® Novus serial number [id number] implanted on Jun/06/2022  RV model 4076 CapSureFix® Novus serial number [id number] on Jun/06/2022

## 2024-03-20 NOTE — PROGRESS NOTE ADULT - SUBJECTIVE AND OBJECTIVE BOX
Aashish Boyer MD  Interventional Cardiology / Advance Heart Failure and Cardiac Transplant Specialist  [place] Office : [phone] [place] [id number]  Tel:   [place] Office : [address] [facility] [address]  Tel: [phone]      Subjective/Overnight events: Pt is lying in bed in RCU  	  MEDICATIONS:  aspirin  chewable 81 milliGRAM(s) Enteral Tube daily  carvedilol 6.25 milliGRAM(s) Oral every 12 hours  doxazosin 2 milliGRAM(s) Oral at bedtime  furosemide    Tablet 20 milliGRAM(s) Oral daily  heparin   Injectable 5000 Unit(s) SubCutaneous every 8 hours  hydrALAZINE 25 milliGRAM(s) Oral every 8 hours  ticagrelor 60 milliGRAM(s) Oral every 12 hours      albuterol/ipratropium for Nebulization 3 milliLiter(s) Nebulizer every 12 hours    acetaminophen   Oral Liquid .. 650 milliGRAM(s) Oral every 6 hours PRN  escitalopram Solution 10 milliGRAM(s) Oral daily  levETIRAcetam  Solution 250 milliGRAM(s) Oral two times a day  melatonin 6 milliGRAM(s) Oral at bedtime  QUEtiapine 25 milliGRAM(s) Oral <User Schedule>  QUEtiapine 50 milliGRAM(s) Oral at bedtime  valproic  acid Syrup 125 milliGRAM(s) Oral every 8 hours    pantoprazole   Suspension 40 milliGRAM(s) Oral every 12 hours  polyethylene glycol 3350 17 Gram(s) Oral daily  senna Syrup 10 milliLiter(s) Oral at bedtime  sucralfate suspension 1 Gram(s) Enteral Tube two times a day    dextrose 50% Injectable 25 Gram(s) IV Push once  dextrose 50% Injectable 12.5 Gram(s) IV Push once  dextrose 50% Injectable 25 Gram(s) IV Push once  dextrose Oral Gel 15 Gram(s) Oral once PRN  glucagon  Injectable 1 milliGRAM(s) IntraMuscular once  insulin glargine Injectable (LANTUS) 14 Unit(s) SubCutaneous <User Schedule>  insulin lispro (ADMELOG) corrective regimen sliding scale   SubCutaneous every 6 hours    artificial  tears Solution 1 Drop(s) Left EYE four times a day  chlorhexidine 0.12% Liquid 15 milliLiter(s) Oral Mucosa every 12 hours  chlorhexidine 2% Cloths 1 Application(s) Topical daily  dextrose 5%. 1000 milliLiter(s) IV Continuous <Continuous>  dextrose 5%. 1000 milliLiter(s) IV Continuous <Continuous>  petrolatum Ophthalmic Ointment 1 Application(s) Left EYE at bedtime      PAST MEDICAL/SURGICAL HISTORY  PAST MEDICAL & SURGICAL HISTORY:  Hyperlipemia      Hypertension      Coronary Artery Disease      Diabetes Mellitus Type II      Stented Coronary Artery  total 5 stents, last stent 5/2019      Neuropathy      Myocardial infarction      Stroke  mild left facial numbness   no other residuals verbalized      Myoclonic jerking      Stage 3 chronic kidney disease      History of Cataract Extraction      Hx of CABG      H/O coronary angiogram      S/P coronary artery stent placement  1/6/09      S/P placement of cardiac pacemaker          SOCIAL HISTORY: Substance Use (street drugs): ( x ) never used  (  ) other:    FAMILY HISTORY:  No pertinent family history in first degree relatives        PHYSICAL EXAM:  T(C): 36.9 (03-20-24 @ 08:00), Max: 36.9 (03-20-24 @ 00:00)  HR: 92 (03-20-24 @ 10:58) (76 - 95)  BP: 112/48 (03-20-24 @ 08:00) (112/48 - 150/65)  RR: 20 (03-20-24 @ 08:00) (18 - 23)  SpO2: 97% (03-20-24 @ 10:58) (96% - 100%)  Wt(kg): --  I&O's Summary    19 Mar 2024 07:01  -  20 Mar 2024 07:00  --------------------------------------------------------  IN: 1240 mL / OUT: 1240 mL / NET: 0 mL            GENERAL: s/p trach  EYES: conjunctiva and sclera clear  ENMT:   Moist mucous membranes, Good dentition, No lesions  Cardiovascular: Normal S1 S2, No JVD, No murmurs, No edema  Respiratory: Lungs clear to auscultation	  Gastrointestinal:  s/p PEG   Extremities: no edema                                    8.3    7.30  )-----------( 297      ( 20 Mar 2024 06:00 )             25.9     03-20    141  |  100  |  39<H>  ----------------------------<  110<H>  4.6   |  28  |  1.21    Ca    8.8      20 Mar 2024 06:00  Phos  3.1     03-20  Mg     2.20     03-20    TPro  7.3  /  Alb  3.2<L>  /  TBili  0.2  /  DBili  x   /  AST  26  /  ALT  17  /  AlkPhos  112  03-20    proBNP:   Lipid Profile:   HgA1c:   TSH:     Consultant(s) Notes Reviewed:  [x ] YES  [ ] NO    Care Discussed with Consultants/Other Providers [ x] YES  [ ] NO    Imaging Personally Reviewed independently:  [x] YES  [ ] NO    All labs, radiologic studies, vitals, orders and medications list reviewed. Patient is seen and examined at bedside. Case discussed with medical team.

## 2024-03-20 NOTE — PROGRESS NOTE ADULT - RESPIRATORY
clear to auscultation bilaterally
clear to auscultation bilaterally
clear to auscultation bilaterally/breath sounds equal
clear to auscultation bilaterally/no wheezes/no rales/no rhonchi
clear to auscultation bilaterally/no wheezes/no rales/no rhonchi/no respiratory distress
bilat coarse bs/breath sounds equal
breath sounds equal

## 2024-03-20 NOTE — PROGRESS NOTE ADULT - ASSESSMENT
IMPRESSION:  89M w/ DM2, HTN, CVA, PAD, CKD3, and CAD-CABG, 12/23/23 p/w COVID19 infection, c/b respiratory failure/hypotension; now s/p tracheostomy    (1)Renal - CKD3; superimposed mild prerenal azotemia - numbers fluctuating based on hemodynamic status, slightly higher   (2)CV - now off pressors and midodrine, BP improved/stable    (3)Pulm - vent-dependent   (4)ID - afebrile, s/p zosyn   (5)GI - s/p PEG (2/20)  (6)Hypernatremia - possibly due to diuresis, Na+ improved    RECOMMEND:   (1)a/w lasix 20 mg iv  PRN   (2)flush PEG as needed   (3)Continue meds for GFR 40-50ml/min  (4)last  aranesp 60 mcg Sq x 1 on  3/18/24          [facility]  Office: [phone]    [facility]  Office: [phone]       IMPRESSION:  89M w/ DM2, HTN, CVA, PAD, CKD3, and CAD-CABG, 12/23/23 p/w COVID19 infection, c/b respiratory failure/hypotension; now s/p tracheostomy    (1)Renal - CKD3; superimposed mild prerenal azotemia - numbers fluctuating based on hemodynamic status, slightly higher   (2)CV - now off pressors and midodrine, BP improved/stable    (3)Pulm - vent-dependent   (4)ID - afebrile, s/p zosyn   (5)GI - s/p PEG (2/20)  (6)Hypernatremia - possibly due to diuresis, Na+ improved    RECOMMEND:   (1)a/w lasix 20 mg iv  PRN   (2)flush PEG as needed   (3)Continue meds for GFR 40-50ml/min  (4)last  aranesp 60 mcg Sq x 1 on  3/18/24          [facility]  Office: [phone]

## 2024-03-20 NOTE — PROCEDURE NOTE - NSPROCNAME_GEN_A_CORE
Tracheal Intubation
Bronchoscopy
Central Line Insertion
Interventional Radiology
Midline Insertion
Bronchoscopy
Bronchoscopy
PPM Interrogation Note
Peripheral Line Insertion
Tracheal Intubation
PPM Interrogation Note
Midline Insertion

## 2024-03-20 NOTE — BH CONSULTATION LIAISON PROGRESS NOTE - NSBHCHARTREVIEWINVESTIGATE_PSY_A_CORE FT
most recent EKG QTc 471 (3/18)
Repeat EKG QTc 472
Repeat EKG QTc 477
most recent EKG QTc 488 (3/20)
most recent EKG QTc 495
< from: 12 Lead ECG (03.03.24 @ 11:40) >      Ventricular Rate 93 BPM    Atrial Rate 93 BPM    P-R Interval 258 ms    QRS Duration 134 ms    Q-T Interval 510 ms    QTC Calculation(Bazett) 634 ms    P Axis 37 degrees    R Axis 13 degrees    T Axis 48 degrees    Diagnosis Line Sinus rhythm with 1st degree A-V block  Right bundle branch block  Abnormal ECG    < from: CT Head No Cont (01.31.24 @ 21:00) >      1. No CT evidence ofan acute territorial infarct or hemorrhage, with   underlying volume loss. No hemorrhage or mass identified.  2. Extensive calcified plaque in the head and neck.  3. Moderate to severe narrowing left internal carotid at the origin.   Severe narrowingright internal carotid by a tandem lesion 1.5 cm above   the bifurcation with a narrowed internal carotid beyond the lesion.  4. Extensive calcified plaque both cavernous and supraclinoid carotid   arteries with narrowing but no occlusion. Mild narrowing proximal right   M1 segment. Moderate narrowing both vertebral V4 segments  5. No perfusion abnormality at the Tmax 6 second threshold, but moderate   hypoperfusion in the right MCA territory at the 4 second threshold    Follow-up MR imaging of the brain recommended for further assessment.

## 2024-03-20 NOTE — BH CONSULTATION LIAISON PROGRESS NOTE - NSBHASSESSMENTFT_PSY_ALL_CORE
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 Course ultimately complicated by progressive WOB and O2 demand and patient intubated and transferred to MICU (1/22). Course further complicated by acute cholecystitis and s/p Perc Lynsey with IR on (1/26), HFrEF 38, pulmonary edema, superimposed PNA, failed extubation failed and prolonged vent time and s/p trach (2/13). Patient transferred to RCU (2/16) and s/p PEG (2/20). Course complicated by volume overload and diuresis and GDMT continued and HFrEF 45 with improvement.  No known psychiatric history. Psychiatry consulted for agitation. He has been receiving PO and IM ativan several times per day, concern for oversedation. Son and DIL are physicians and requested consult.     Due to multiple medical comorbities and prolonged QTc, would hold off on antipsychotics and other QT prolonging agents.   Ativan is not an ideal PRN for this patient given deliriogenic effects and paradoxical activation.  Discussed with family plan to start low dose depakote for impulsivity and agitation and uptitrated as tolerated.    3/12: received ativan x2 overnight for ongoing agitation. LFTs wnl. Will continue to increase depakote and monitor. Would recommend cross tapering with keppra as keppra may be contributing to agitated delirium. Discussed with family.     3/14: some improvement in agitation with depakote, repeat EKG with QTc <500, still proceed with caution with antipsychotics, though antipsychotics are preferable to benzos for agitation in delirium    3/15: still requiring PRNs, appears to respond better to ativan than to olanzapine   - Depakote decreased to 125mg TID in response to hyperammonemia    3/18: ammonia improved, remains agitated requiring PRNs. Now that EKG has improved, can start standing seroquel for agitation and titrate as tolerated, monitor QTc.     3/19: started seroquel 25/50, no PRNs required overnight. Appears to be helping with agitation and delirium. Not obtunded on exam today.     3/20: awake and agitated, not sleeping, delirious.  Can add PRN seroquel 12.5mg q6h PRN for agitation  ALSO INCREASE standing seroquel to 25mg qd, 75mg qhs  consider increasing depakote again now that albumin is improving    Plan:  - INCREASE quetiapine 25mg @ 8am and 75mg qhs for delirium and agitation, hold for QTc > 500  - monitor EKG  - continue using ativan 0.25mg q4h PRN for severe agitation per RCU  - continue with depakote 125mg TID for impulsivity and agitation  - defer taper of keppra to primary team  - delirium precautions   - monitor LFTs, platelets, ammonia, VPA level   - of note, his VPA level will need to be adjusted for low albumin- please indicate this on DC summary     89 YO M with very complicated medical history and prolonged hospital course, notable for PMHx of CAD s/p CABG and GEMMA (last GEMMA 5/2022 on ASA and Brilinta), 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 Course ultimately complicated by progressive WOB and O2 demand and patient intubated and transferred to MICU (1/22). Course further complicated by acute cholecystitis and s/p Perc Lynsey with IR on (1/26), HFrEF 38, pulmonary edema, superimposed PNA, failed extubation failed and prolonged vent time and s/p trach (2/13). Patient transferred to RCU (2/16) and s/p PEG (2/20). Course complicated by volume overload and diuresis and GDMT continued and HFrEF 45 with improvement.  No known psychiatric history. Psychiatry consulted for agitation. He has been receiving PO and IM ativan several times per day, concern for oversedation. Son and DIL are physicians and requested consult.     Due to multiple medical comorbities and prolonged QTc, would hold off on antipsychotics and other QT prolonging agents.   Ativan is not an ideal PRN for this patient given deliriogenic effects and paradoxical activation.  Discussed with family plan to start low dose depakote for impulsivity and agitation and uptitrated as tolerated.    3/12: received ativan x2 overnight for ongoing agitation. LFTs wnl. Will continue to increase depakote and monitor. Would recommend cross tapering with keppra as keppra may be contributing to agitated delirium. Discussed with family.     3/14: some improvement in agitation with depakote, repeat EKG with QTc <500, still proceed with caution with antipsychotics, though antipsychotics are preferable to benzos for agitation in delirium    3/15: still requiring PRNs, appears to respond better to ativan than to olanzapine   - Depakote decreased to 125mg TID in response to hyperammonemia    3/18: ammonia improved, remains agitated requiring PRNs. Now that EKG has improved, can start standing seroquel for agitation and titrate as tolerated, monitor QTc.     3/19: started seroquel 25/50, no PRNs required overnight. Appears to be helping with agitation and delirium. Not obtunded on exam today.     3/20: awake and agitated, not sleeping, delirious.  Can add PRN seroquel 12.5mg q6h PRN for agitation  ALSO INCREASE standing seroquel to 25mg qd, 75mg qhs      Plan:  - INCREASE quetiapine 25mg @ 8am and 75mg qhs for delirium and agitation, hold for QTc > 500  - monitor EKG  - continue using ativan 0.25mg q4h PRN for severe agitation per RCU  - continue with depakote 125mg TID for impulsivity and agitation  - defer taper of keppra to primary team  - delirium precautions   - monitor LFTs, platelets, ammonia, VPA level   - of note, his VPA level will need to be adjusted for low albumin- please indicate this on DC summary

## 2024-03-20 NOTE — PROCEDURE NOTE - PROCEDURE DATE TIME, MLM
02-Feb-2024 16:15
18-Feb-2024 14:00
22-Jan-2024 16:44
20-Mar-2024 17:37
29-Feb-2024 12:00
18-Jan-2024 13:49
22-Jan-2024 16:39
26-Jan-2024 17:00
20-Feb-2024 14:45
01-Jan-2024 20:05
02-Feb-2024 16:00

## 2024-03-20 NOTE — PROCEDURE NOTE - NSINDICATIONS_GEN_A_CORE
antibiotic therapy/venous access
venous access
critical illness/emergency venous access
airway protection/critical patient/respiratory distress/respiratory failure
airway protection/respiratory distress/respiratory failure
venous access

## 2024-03-20 NOTE — PROGRESS NOTE ADULT - SUBJECTIVE AND OBJECTIVE BOX
NEPHROLOGY     Patient seen and examined with staff at bedside, pt lin loya, no acute distress.   on  1:1 for agitations  family at bedside    MEDICATIONS  (STANDING):  albuterol/ipratropium for Nebulization 3 milliLiter(s) Nebulizer every 12 hours  artificial  tears Solution 1 Drop(s) Left EYE four times a day  aspirin  chewable 81 milliGRAM(s) Enteral Tube daily  carvedilol 6.25 milliGRAM(s) Oral every 12 hours  chlorhexidine 0.12% Liquid 15 milliLiter(s) Oral Mucosa every 12 hours  chlorhexidine 2% Cloths 1 Application(s) Topical daily  dextrose 5%. 1000 milliLiter(s) (100 mL/Hr) IV Continuous <Continuous>  dextrose 5%. 1000 milliLiter(s) (50 mL/Hr) IV Continuous <Continuous>  dextrose 50% Injectable 25 Gram(s) IV Push once  dextrose 50% Injectable 25 Gram(s) IV Push once  dextrose 50% Injectable 12.5 Gram(s) IV Push once  divalproex Sprinkle 125 milliGRAM(s) Oral two times a day  doxazosin 2 milliGRAM(s) Oral at bedtime  escitalopram Solution 10 milliGRAM(s) Oral daily  furosemide    Tablet 20 milliGRAM(s) Oral daily  glucagon  Injectable 1 milliGRAM(s) IntraMuscular once  heparin   Injectable 5000 Unit(s) SubCutaneous every 8 hours  hydrALAZINE 50 milliGRAM(s) Oral every 8 hours  insulin glargine Injectable (LANTUS) 14 Unit(s) SubCutaneous <User Schedule>  insulin lispro (ADMELOG) corrective regimen sliding scale   SubCutaneous every 6 hours  levETIRAcetam  Solution 500 milliGRAM(s) Oral two times a day  melatonin 6 milliGRAM(s) Oral at bedtime  pantoprazole   Suspension 40 milliGRAM(s) Oral every 12 hours  petrolatum Ophthalmic Ointment 1 Application(s) Left EYE at bedtime  polyethylene glycol 3350 17 Gram(s) Oral daily  senna Syrup 10 milliLiter(s) Oral at bedtime  sucralfate suspension 1 Gram(s) Enteral Tube two times a day  ticagrelor 60 milliGRAM(s) Oral every 12 hours    VITALS:  T(C): , Max: 37 (03-11-24 @ 11:51)  T(F): , Max: 98.6 (03-11-24 @ 11:51)  HR: 95 (03-12-24 @ 11:30)  BP: 113/52 (03-12-24 @ 09:00)  BP(mean): 57 (03-12-24 @ 09:00)  RR: 18 (03-12-24 @ 09:00)  SpO2: 97% (03-12-24 @ 11:30)  Wt(kg): --  I and O's:    03-11 @ 07:01  -  03-12 @ 07:00  --------------------------------------------------------  IN: 1450 mL / OUT: 20 mL / NET: 1430 mL    PHYSICAL EXAM:  Constitutional: trach to vent   HEENT: +trach  Respiratory: coarse bs   Cardiovascular: normal s1s2  Gastrointestinal: BS+, soft, NT/ND +PEG  Extremities:+ peripheral edema  : + external catheter   Skin: No rashes    LABS:                        8.0    8.32  )-----------( 340      ( 12 Mar 2024 05:30 )             25.5     03-12    137  |  97<L>  |  50<H>  ----------------------------<  190<H>  4.7   |  30  |  1.50<H>    Ca    9.0      12 Mar 2024 05:30  Phos  3.8     03-12  Mg     2.50     03-12    TPro  7.2  /  Alb  3.0<L>  /  TBili  0.3  /  DBili  <0.2  /  AST  22  /  ALT  19  /  AlkPhos  176<H>  03-12      Urine Studies:  Urinalysis Basic - ( 12 Mar 2024 05:30 )    Color: x / Appearance: x / SG: x / pH: x  Gluc: 190 mg/dL / Ketone: x  / Bili: x / Urobili: x   Blood: x / Protein: x / Nitrite: x   Leuk Esterase: x / RBC: x / WBC x   Sq Epi: x / Non Sq Epi: x / Bacteria: x    RADIOLOGY & ADDITIONAL STUDIES:    rad< from: US Duplex Venous Lower Ext Complete, Bilateral (03.11.24 @ 19:29) >  IMPRESSION:  No evidence of deep venous thrombosis in either lower extremity.    Increased venous pulsatility suggestive of right-sided cardiac   dysfunction.        --- End of Report ---      ADEBAYO PALACIO MD; Attending Radiologist  This document has been electronically signed. Mar 11 2024  7:37PM    < end of copied text >

## 2024-03-20 NOTE — BH CONSULTATION LIAISON PROGRESS NOTE - ATTENDING COMMENTS
Chart reviewed. While I did not examine this patient in person, I spoke with Dr. Malone about her encounter and agree with her history, MSE, A/P.

## 2024-03-20 NOTE — PROGRESS NOTE ADULT - SUBJECTIVE AND OBJECTIVE BOX
CHIEF COMPLAINT: Patient is a 89y old  Male who presents with a chief complaint of COVID19, Chest pain (19 Mar 2024 15:26)      Interval Events: No acute overnight events     REVIEW OF SYSTEMS:    [x ] Unable to assess ROS because AMS    Mode: AC/ CMV (Assist Control/ Continuous Mandatory Ventilation), RR (machine): 16, TV (machine): 400, FiO2: 30, PEEP: 5, ITime: 0.64, MAP: 13, PIP: 33      OBJECTIVE:  ICU Vital Signs Last 24 Hrs  T(C): 36.9 (20 Mar 2024 00:00), Max: 36.9 (20 Mar 2024 00:00)  T(F): 98.4 (20 Mar 2024 00:00), Max: 98.4 (20 Mar 2024 00:00)  HR: 92 (20 Mar 2024 07:04) (76 - 92)  BP: 146/68 (20 Mar 2024 00:00) (122/54 - 150/65)  BP(mean): --  ABP: --  ABP(mean): --  RR: 19 (20 Mar 2024 00:00) (16 - 23)  SpO2: 98% (20 Mar 2024 07:04) (96% - 100%)    O2 Parameters below as of 20 Mar 2024 00:00  Patient On (Oxygen Delivery Method): ventilator    O2 Concentration (%): 30      Mode: AC/ CMV (Assist Control/ Continuous Mandatory Ventilation), RR (machine): 16, TV (machine): 400, FiO2: 30, PEEP: 5, ITime: 0.64, MAP: 13, PIP: 33    03-19 @ 07:01  -  03-20 @ 07:00  --------------------------------------------------------  IN: 700 mL / OUT: 640 mL / NET: 60 mL      CAPILLARY BLOOD GLUCOSE      POCT Blood Glucose.: 134 mg/dL (20 Mar 2024 06:15)      HOSPITAL MEDICATIONS:  MEDICATIONS  (STANDING):  albuterol/ipratropium for Nebulization 3 milliLiter(s) Nebulizer every 12 hours  artificial  tears Solution 1 Drop(s) Left EYE four times a day  aspirin  chewable 81 milliGRAM(s) Enteral Tube daily  carvedilol 6.25 milliGRAM(s) Oral every 12 hours  chlorhexidine 0.12% Liquid 15 milliLiter(s) Oral Mucosa every 12 hours  chlorhexidine 2% Cloths 1 Application(s) Topical daily  dextrose 5%. 1000 milliLiter(s) (100 mL/Hr) IV Continuous <Continuous>  dextrose 5%. 1000 milliLiter(s) (50 mL/Hr) IV Continuous <Continuous>  dextrose 50% Injectable 25 Gram(s) IV Push once  dextrose 50% Injectable 12.5 Gram(s) IV Push once  dextrose 50% Injectable 25 Gram(s) IV Push once  doxazosin 2 milliGRAM(s) Oral at bedtime  escitalopram Solution 10 milliGRAM(s) Oral daily  furosemide    Tablet 20 milliGRAM(s) Oral daily  glucagon  Injectable 1 milliGRAM(s) IntraMuscular once  heparin   Injectable 5000 Unit(s) SubCutaneous every 8 hours  hydrALAZINE 25 milliGRAM(s) Oral every 8 hours  insulin glargine Injectable (LANTUS) 14 Unit(s) SubCutaneous <User Schedule>  insulin lispro (ADMELOG) corrective regimen sliding scale   SubCutaneous every 6 hours  levETIRAcetam  Solution 250 milliGRAM(s) Oral two times a day  melatonin 6 milliGRAM(s) Oral at bedtime  pantoprazole   Suspension 40 milliGRAM(s) Oral every 12 hours  petrolatum Ophthalmic Ointment 1 Application(s) Left EYE at bedtime  polyethylene glycol 3350 17 Gram(s) Oral daily  QUEtiapine 25 milliGRAM(s) Oral <User Schedule>  QUEtiapine 50 milliGRAM(s) Oral at bedtime  senna Syrup 10 milliLiter(s) Oral at bedtime  sucralfate suspension 1 Gram(s) Enteral Tube two times a day  ticagrelor 60 milliGRAM(s) Oral every 12 hours  valproic  acid Syrup 125 milliGRAM(s) Oral every 8 hours    MEDICATIONS  (PRN):  acetaminophen   Oral Liquid .. 650 milliGRAM(s) Oral every 6 hours PRN Temp greater or equal to 38C (100.4F), Mild Pain (1 - 3), Moderate Pain (4 - 6)  dextrose Oral Gel 15 Gram(s) Oral once PRN Blood Glucose LESS THAN 70 milliGRAM(s)/deciliter      LABS:                        8.5    6.41  )-----------( 282      ( 19 Mar 2024 06:24 )             26.3     03-19    141  |  99  |  39<H>  ----------------------------<  113<H>  4.1   |  29  |  1.22    Ca    8.7      19 Mar 2024 06:24  Phos  3.5     03-19  Mg     2.20     03-19        Urinalysis Basic - ( 19 Mar 2024 06:24 )    Color: x / Appearance: x / SG: x / pH: x  Gluc: 113 mg/dL / Ketone: x  / Bili: x / Urobili: x   Blood: x / Protein: x / Nitrite: x   Leuk Esterase: x / RBC: x / WBC x   Sq Epi: x / Non Sq Epi: x / Bacteria: x            PAST MEDICAL & SURGICAL HISTORY:  Hyperlipemia      Hypertension      Coronary Artery Disease      Diabetes Mellitus Type II      Stented Coronary Artery  total 5 stents, last stent 5/2019      Neuropathy      Myocardial infarction      Stroke  mild left facial numbness   no other residuals verbalized      Myoclonic jerking      Stage 3 chronic kidney disease      History of Cataract Extraction      Hx of CABG      H/O coronary angiogram      S/P coronary artery stent placement  1/6/09      S/P placement of cardiac pacemaker          FAMILY HISTORY:  No pertinent family history in first degree relatives        Social History:  Lives with family  Ambulates with walker  Denies tobacco, EtOH, or illicit substance use (23 Dec 2023 22:51)      RADIOLOGY:  [ ] Reviewed and interpreted by me    PULMONARY FUNCTION TESTS:    EKG:

## 2024-03-20 NOTE — PROGRESS NOTE ADULT - CARDIOVASCULAR
regular rate and rhythm
regular rate and rhythm/S1 S2 present
regular rate and rhythm
regular rate and rhythm/S1 S2 present
regular rate and rhythm

## 2024-03-20 NOTE — PROGRESS NOTE ADULT - NS ATTEND AMEND GEN_ALL_CORE FT
89 yo M w/ PMH of CAD sp CABG, bifascicular block sp PPM, HTN, CKD, CVA,     Neuro: hx of CVA, encephalopathy. Currently awake, periods of intermittent agitation, on 1:1. On Depakote 125 q8h - weaning off, Ativan 0.25 IV q6h prn for agitation.  Keppra being tapered as well - now on 250 q12h. Appreciate behavioral health input.  Seroquel resumed with good effect, pt calm but awakens to voice    CVS:  CAD, Afib, HFrEF - TTE 45% 3/4, coreg, hydralazine, not on AC due to GIB, lasix 20 mg daily, on ASA and Brillinta   GI: on PPI and carafate  Resp: on VCAC - trials of PS daily.  ID: off antibiotics, completed etrapenem on 3/6, recently treated w/ Klebsiella PNA.   rest of plan as above.

## 2024-03-20 NOTE — PROGRESS NOTE ADULT - ASSESSMENT
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 Course ultimately complicated by progressive WOB and O2 demand and patient intubated and transferred to MICU (1/22). Course further complicated by acute cholecystitis and s/p Perc Lynsey with IR on (1/26), HFrEF 38, pulmonary edema, superimposed PNA, failed extubation failed and prolonged vent time and s/p trach (2/13). Patient transferred to RCU (2/16) and s/p PEG (2/20). Course complicated by volume overload and diuresis and GDMT continued and HFrEF 45 with improvement    NEUROLOGY   # Encephalopathy   - Hx of CVA with no residual deficits per family, however per family worsening confusion at home over the past 6 months (becoming disoriented to time) but prior to admission has been more confused, talking about seeing people that are not present.  - CTH (1/31) with no evidence of acute infarct  - Continue on ASA and Brilinta  - Holding Neurontin, Memantine and Oxycodone in setting of somnolence  3/17: +obtuneded overnight, CT Head: Mild chronic microvascular changes without evidence of an acute transcortical infarction or hemorrhage.  - VBG and Ammonia level grossly unremarkable  - Much awake and alert in the afternoon, agitated, will consider Seroquel/low dose Ativan if needed   - 3/18 agitated in am seen by  and bid seroquel with good results    # ICA stenosis   - CTA NECK (1/31) with moderate to severe narrowing left internal carotid at the origin. Severe narrowing right internal carotid by a tandem lesion 1.5 cm above the bifurcation with a narrowed internal carotid beyond the lesion. Extensive calcified plaque both cavernous and supraclinoid carotid arteries with narrowing but no occlusion. Mild narrowing proximal right M1 segment. Moderate narrowing both vertebral V4 segments. No perfusion abnormality at the Tmax 6 second threshold, but moderate hypoperfusion in the right MCA territory at the 4 second threshold.   - Continue on ASA and Brilinta    # Myoclonic jerks   - Hx of myoclonic jerks post CVAs   - EEG (1/18-2/6) negative for seizures  - Continue on Keppra and per neuro/ psych wishes to wean, family in agreement   - Currently down titrating Keppra 500 mg BID, now Keppra 250 mg BID 3/13 - )    # Depression/ Insomnia  - Continue on Lexapro per home medication   - Course complicated by agitation and pulling on tubes   - Home Seroquel discontinued as patient now on Valproic acid syrup   - S/p Ativan 0.5mg PO Q6H PRN and Haldol 0.25 mg Q6H for severe agitation, then s/p IM Zypexa 1.25 Q8H PRN for agitation    - Supportive care   - s/p Seroquel 12.5 x 1 w no significant improvement, still agitated, then Ativan 0.25mg x1   - Will monitor closely    # Agitation in setting of delirium / underlying vascular dementia   - BH following, c/w Depakene 125mg q8hr  - monitor platelets, LFTs while on Depakote   - C/w VPA TID (250mg QAM, 125mg afternoon, 375mg QHS)  - Recently, Pt had responded much better to Ativan than Zyprexa for agitation, however became obtunded, so Ativan d/c Ativan 0.5mg Q6H PRN  - FU by  today still requires CO /hand holding to prevent trach manipulation   - Start seroquel bid 25mg/50mg     CARDIOLOGY   # Vasoplegic vs septic shock  # Hx of HTN   - Weaned off pressors in ICU and now with mild hypertension   - Continue on coreg and hydral as below   - Strict BP control given moderate AS  - Increased vascular congestion on CXR (3/9) so will give additional Lasix 20mg PO x1 (3/10)    # AFIB RVR   # Bifascicular Block with Medtronic PPM   - AFIB RVR episodes and PPM interrogated (2/20) by EP with similar episodes  - Previous admission in 6/2022 with cardiogenic syncope second to bifasicular block, however now with PPM and AV myron blockers ok.   - Continue on lopressor 12.5mg BID however replaced with coreg second to HFrEF   - AC discussed at length however with recent GIB will hold for now     # HFrEF 38 likely stress induced?   # Mild to moderate AS   - ECHO (12/2023) with EF 62 with normal LVSF and mild to moderate AS   - ECHO (2/2024) with EF 38, moderate LVSD with global LV hypokinesis, mildly reduced RVSF (TAPSE 1.3), mild to moderate MR, mild AR, and moderate AS.   - RPT ECHO (3/4) with EF 45 and mild to moderate LVSD   - Continue on Lasix 20 QD, coreg 6.25 and hydralazine 50 (increased 3/4)   - Hold isordil and up titrate above medications first     # CAD with CABG   - CATH (5/2022) with dLAD 70, SVG graft to OM1 with 90 in stent stenosis s/p PCI and GEMMA placement, and LIMA graft to mLAD with no disease.   - Continue on ASA and Brilinta    # Prolonged QTC (Resolved)  - ECG (3/2) with QTC 616ms (corrected using bazzet 490ms)   - ECG (3/3) with QTC 634ms (corrected using bazzet 490ms)   - ECG (3/11) with QTC 490ms   - EKG (3/15) QTc 498ms  - Monitor off/ avoid QTC prolonging agents for now      RESPIRATORY   # Acute respiratory failure second to SARS-COV-2 vs pulm edema vs PNA  - Presented with COVID complicated by sepsis second to acute lynsey and worsening respiratory failure second to pulmonary edema requiring intubation.   - Prolonged intubation and s/p tracheostomy (2/13)   - CT CHEST 1/16 with new small bilateral pleural effusions/ atelectasis/ patchy bilateral ground-glass opacities are consistent with pulmonary edema.  - Completed ABX and COVID regimen as below   - Continue on vent and allow SBT as tolerated   - Continue on nebs and hold further HTS given intermittent hemoptysis  - Continue on diuresis as above    # Mild hemoptysis likely from suction trauma   - s/p bronch (2/18) with no active bleed  - Hemoptysis improved with TXA and holding further HTS as above   - Tiny hemoptysis second to suction trauma imporving  - Careful with suctioning   - Recurrent hemoptysis (3/9) so ordered for TXA nebs again however hemoptysis appeared self limited and stopped before nebs even given    HEENT   # L eye subconjunctival hemorrhage   - Continue on artifical tears and Lacrilube   - Optho eval appreciated     GI  # Nutrition/ Dysphagia   - PEG placed by GI on 2/20 and tube feeds continued.   - Loose stools and Banatrol continued     # UGIB   - EGD (1/2) with esophagitis and bleeding Dieulafoy's lesion s/p clips.   - Continue on PPI and carafate     # SMA calcification   - CTA demonstrating mesenteric fat stranding associated with ascending/transverse colon  - Diagnostic laparoscopy (12/24) with small bowel and visible colon viable, some inflammation of omentum in RUQ  - SMA Angiogram and stent placed (12/25).   - Continue on ASA and Brilinta     # Acute Cholecystitis   - CT (12/26) with distended GB with cholelithiasis and sludge   - HIDA (12/29) POSITIVE for acute cholecystitis   - Case discussed with IR at length and s/p PERC LYNSEY (1/26)   - Completed zosyn course (12/24-12/31)   - PERC LYNSEY tube to stay in until surgical candidate for cholecystectomy to prevent recurrence     / RENAL   # CKD   - CRE at baseline   - Monitor renal function and UOP   - Aranesp SQ x1 (3/8)    # Hypernatremia (resolved)   - s/p  Q4H  - Monitor closely with diuresis as above   - Stop FWF (3/15) given c/f worsening vol overload     INFECTIOUS DISEASE   # COVID with superimposed PNA   # ESBL Kleb PNA   - COVID POSITIVE (12/23) and completed remdesivir x 1 dose and paxlovid course.   - WOB worsened as above requiring intubation and cultures negative. Zosyn completed empirically however hypothermic (2/11) and BCx, UA, RVP and BAL negative.   - Completed additional zosyn (2/12-2/19) empirically   - Fever spike and thick secretions and SCX (2/26) with ESBL klebs.   - s/p Zosyn (2/27 - 2/29) but resistant and completed Erta (2/29 - 3/6)     HEME  # AOCD   - Monitor HH   - DVT PPX with HSQ     ENDOCRINE   # DM2 A1C 9.3   - Continue on Lantus 14 and ISS     SKIN/ TUBES   - Trach (2/13)   - PEG (2/20)   - PERC LYNSEY (1/26 - )     ETHICS   - FULL CODE     DISPO - vent facility and family aware. SW with accepting facility however pending available bed.  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 Course ultimately complicated by progressive WOB and O2 demand and patient intubated and transferred to MICU (1/22). Course further complicated by acute cholecystitis and s/p Perc Lynsey with IR on (1/26), HFrEF 38, pulmonary edema, superimposed PNA, failed extubation failed and prolonged vent time and s/p trach (2/13). Patient transferred to RCU (2/16) and s/p PEG (2/20). Course complicated by volume overload and diuresis and GDMT continued and HFrEF 45 with improvement    NEUROLOGY   # Encephalopathy   - Hx of CVA with no residual deficits per family, however per family worsening confusion at home over the past 6 months (becoming disoriented to time) but prior to admission has been more confused, talking about seeing people that are not present.  - CTH (1/31) with no evidence of acute infarct  - Continue on ASA and Brilinta  - Holding Neurontin, Memantine and Oxycodone in setting of somnolence  3/17: +obtuneded overnight, CT Head: Mild chronic microvascular changes without evidence of an acute transcortical infarction or hemorrhage.  - VBG and Ammonia level grossly unremarkable  - Much awake and alert in the afternoon, agitated, will consider Seroquel/low dose Ativan if needed   - 3/18 agitated in am seen by  and bid seroquel with good results       # ICA stenosis   - CTA NECK (1/31) with moderate to severe narrowing left internal carotid at the origin. Severe narrowing right internal carotid by a tandem lesion 1.5 cm above the bifurcation with a narrowed internal carotid beyond the lesion. Extensive calcified plaque both cavernous and supraclinoid carotid arteries with narrowing but no occlusion. Mild narrowing proximal right M1 segment. Moderate narrowing both vertebral V4 segments. No perfusion abnormality at the Tmax 6 second threshold, but moderate hypoperfusion in the right MCA territory at the 4 second threshold.   - Continue on ASA and Brilinta    # Myoclonic jerks   - Hx of myoclonic jerks post CVAs   - EEG (1/18-2/6) negative for seizures  - Continue on Keppra and per neuro/ psych wishes to wean, family in agreement   - Currently down titrating Keppra 500 mg BID, now Keppra 250 mg BID 3/13 - )    # Depression/ Insomnia  - Continue on Lexapro per home medication   - Course complicated by agitation and pulling on tubes   - Home Seroquel discontinued as patient now on Valproic acid syrup   - S/p Ativan 0.5mg PO Q6H PRN and Haldol 0.25 mg Q6H for severe agitation, then s/p IM Zypexa 1.25 Q8H PRN for agitation    - Supportive care   - s/p Seroquel 12.5 x 1 w no significant improvement, still agitated, then Ativan 0.25mg x1   - Will monitor closely    # Agitation in setting of delirium / underlying vascular dementia   - BH following, c/w Depakene 125mg q8hr  - monitor platelets, LFTs while on Depakote   - C/w VPA TID (250mg QAM, 125mg afternoon, 375mg QHS)  - Recently, Pt had responded much better to Ativan than Zyprexa for agitation, however became obtunded, so Ativan d/c Ativan 0.5mg Q6H PRN  - FU by  today still requires CO /hand holding to prevent trach manipulation   - Start seroquel bid 25mg/50mg   -3/20 restless /agitated overnight ativan x 2 no effect - DW psych can do seroquel 12.5 prn /increase night seroquel to 75mg -continues on 1:1 restless with  family    CARDIOLOGY   # Vasoplegic vs septic shock  # Hx of HTN   - Weaned off pressors in ICU and now with mild hypertension   - Continue on coreg and hydral as below   - Strict BP control given moderate AS  - Increased vascular congestion on CXR (3/9) so will give additional Lasix 20mg PO x1 (3/10)  - Diastolic bp low <50 today hold hydralizine and monitor bp - may need to decrease of dc     # AFIB RVR   # Bifascicular Block with Medtronic PPM   - AFIB RVR episodes and PPM interrogated (2/20) by EP with similar episodes  - Previous admission in 6/2022 with cardiogenic syncope second to bifasicular block, however now with PPM and AV myron blockers ok.   - Continue on lopressor 12.5mg BID however replaced with coreg second to HFrEF   - AC discussed at length however with recent GIB will hold for now     # HFrEF 38 likely stress induced?   # Mild to moderate AS   - ECHO (12/2023) with EF 62 with normal LVSF and mild to moderate AS   - ECHO (2/2024) with EF 38, moderate LVSD with global LV hypokinesis, mildly reduced RVSF (TAPSE 1.3), mild to moderate MR, mild AR, and moderate AS.   - RPT ECHO (3/4) with EF 45 and mild to moderate LVSD   - Continue on Lasix 20 QD, coreg 6.25 and hydralazine 50 (increased 3/4)   - Hold isordil and up titrate above medications first     # CAD with CABG   - CATH (5/2022) with dLAD 70, SVG graft to OM1 with 90 in stent stenosis s/p PCI and GEMMA placement, and LIMA graft to mLAD with no disease.   - Continue on ASA and Brilinta    # Prolonged QTC (Resolved)  - ECG (3/2) with QTC 616ms (corrected using bazzet 490ms)   - ECG (3/3) with QTC 634ms (corrected using bazzet 490ms)   - ECG (3/11) with QTC 490ms   - EKG (3/15) QTc 498ms  - Monitor off/ avoid QTC prolonging agents for now      RESPIRATORY   # Acute respiratory failure second to SARS-COV-2 vs pulm edema vs PNA  - Presented with COVID complicated by sepsis second to acute lynsey and worsening respiratory failure second to pulmonary edema requiring intubation.   - Prolonged intubation and s/p tracheostomy (2/13)   - CT CHEST 1/16 with new small bilateral pleural effusions/ atelectasis/ patchy bilateral ground-glass opacities are consistent with pulmonary edema.  - Completed ABX and COVID regimen as below   - Continue on vent and allow SBT as tolerated   - Continue on nebs and hold further HTS given intermittent hemoptysis  - Continue on diuresis as above    # Mild hemoptysis likely from suction trauma   - s/p bronch (2/18) with no active bleed  - Hemoptysis improved with TXA and holding further HTS as above   - Tiny hemoptysis second to suction trauma imporving  - Careful with suctioning   - Recurrent hemoptysis (3/9) so ordered for TXA nebs again however hemoptysis appeared self limited and stopped before nebs even given    HEENT   # L eye subconjunctival hemorrhage   - Continue on artifical tears and Lacrilube   - Optho eval appreciated     GI  # Nutrition/ Dysphagia   - PEG placed by GI on 2/20 and tube feeds continued.   - Loose stools and Banatrol continued     # UGIB   - EGD (1/2) with esophagitis and bleeding Dieulafoy's lesion s/p clips.   - Continue on PPI and carafate     # SMA calcification   - CTA demonstrating mesenteric fat stranding associated with ascending/transverse colon  - Diagnostic laparoscopy (12/24) with small bowel and visible colon viable, some inflammation of omentum in RUQ  - SMA Angiogram and stent placed (12/25).   - Continue on ASA and Brilinta     # Acute Cholecystitis   - CT (12/26) with distended GB with cholelithiasis and sludge   - HIDA (12/29) POSITIVE for acute cholecystitis   - Case discussed with IR at length and s/p PERC LYNSEY (1/26)   - Completed zosyn course (12/24-12/31)   - PERC LYNSEY tube to stay in until surgical candidate for cholecystectomy to prevent recurrence     / RENAL   # CKD   - CRE at baseline   - Monitor renal function and UOP   - Aranesp SQ x1 (3/8)    # Hypernatremia (resolved)   - s/p  Q4H  - Monitor closely with diuresis as above   - Stop FWF (3/15) given c/f worsening vol overload     INFECTIOUS DISEASE   # COVID with superimposed PNA   # ESBL Kleb PNA   - COVID POSITIVE (12/23) and completed remdesivir x 1 dose and paxlovid course.   - WOB worsened as above requiring intubation and cultures negative. Zosyn completed empirically however hypothermic (2/11) and BCx, UA, RVP and BAL negative.   - Completed additional zosyn (2/12-2/19) empirically   - Fever spike and thick secretions and SCX (2/26) with ESBL klebs.   - s/p Zosyn (2/27 - 2/29) but resistant and completed Erta (2/29 - 3/6)     HEME  # AOCD   - Monitor HH   - DVT PPX with HSQ     ENDOCRINE   # DM2 A1C 9.3   - Continue on Lantus 14 and ISS     SKIN/ TUBES   - Trach (2/13)   - PEG (2/20)   - PERC LYNSEY (1/26 - )     ETHICS   - FULL CODE     DISPO - vent facility and family aware. SW with accepting facility however pending available bed.

## 2024-03-21 LAB
ADD ON TEST-SPECIMEN IN LAB: SIGNIFICANT CHANGE UP
ALBUMIN SERPL ELPH-MCNC: 3.1 G/DL — LOW (ref 3.3–5)
ALP SERPL-CCNC: 120 U/L — SIGNIFICANT CHANGE UP (ref 40–120)
ALT FLD-CCNC: 15 U/L — SIGNIFICANT CHANGE UP (ref 4–41)
ANION GAP SERPL CALC-SCNC: 11 MMOL/L — SIGNIFICANT CHANGE UP (ref 7–14)
AST SERPL-CCNC: 21 U/L — SIGNIFICANT CHANGE UP (ref 4–40)
BASOPHILS # BLD AUTO: 0.03 K/UL — SIGNIFICANT CHANGE UP (ref 0–0.2)
BASOPHILS NFR BLD AUTO: 0.4 % — SIGNIFICANT CHANGE UP (ref 0–2)
BILIRUB DIRECT SERPL-MCNC: <0.2 MG/DL — SIGNIFICANT CHANGE UP (ref 0–0.3)
BILIRUB INDIRECT FLD-MCNC: >0.1 MG/DL — SIGNIFICANT CHANGE UP (ref 0–1)
BILIRUB SERPL-MCNC: 0.3 MG/DL — SIGNIFICANT CHANGE UP (ref 0.2–1.2)
BUN SERPL-MCNC: 39 MG/DL — HIGH (ref 7–23)
CALCIUM SERPL-MCNC: 8.8 MG/DL — SIGNIFICANT CHANGE UP (ref 8.4–10.5)
CHLORIDE SERPL-SCNC: 99 MMOL/L — SIGNIFICANT CHANGE UP (ref 98–107)
CO2 SERPL-SCNC: 29 MMOL/L — SIGNIFICANT CHANGE UP (ref 22–31)
CREAT SERPL-MCNC: 1.25 MG/DL — SIGNIFICANT CHANGE UP (ref 0.5–1.3)
EGFR: 55 ML/MIN/1.73M2 — LOW
EOSINOPHIL # BLD AUTO: 0.52 K/UL — HIGH (ref 0–0.5)
EOSINOPHIL NFR BLD AUTO: 7.7 % — HIGH (ref 0–6)
GLUCOSE BLDC GLUCOMTR-MCNC: 138 MG/DL — HIGH (ref 70–99)
GLUCOSE BLDC GLUCOMTR-MCNC: 142 MG/DL — HIGH (ref 70–99)
GLUCOSE BLDC GLUCOMTR-MCNC: 165 MG/DL — HIGH (ref 70–99)
GLUCOSE SERPL-MCNC: 140 MG/DL — HIGH (ref 70–99)
HCT VFR BLD CALC: 26.5 % — LOW (ref 39–50)
HGB BLD-MCNC: 8.5 G/DL — LOW (ref 13–17)
IANC: 3.19 K/UL — SIGNIFICANT CHANGE UP (ref 1.8–7.4)
IMM GRANULOCYTES NFR BLD AUTO: 0.4 % — SIGNIFICANT CHANGE UP (ref 0–0.9)
LYMPHOCYTES # BLD AUTO: 2.36 K/UL — SIGNIFICANT CHANGE UP (ref 1–3.3)
LYMPHOCYTES # BLD AUTO: 34.8 % — SIGNIFICANT CHANGE UP (ref 13–44)
MAGNESIUM SERPL-MCNC: 2.2 MG/DL — SIGNIFICANT CHANGE UP (ref 1.6–2.6)
MCHC RBC-ENTMCNC: 29.2 PG — SIGNIFICANT CHANGE UP (ref 27–34)
MCHC RBC-ENTMCNC: 32.1 GM/DL — SIGNIFICANT CHANGE UP (ref 32–36)
MCV RBC AUTO: 91.1 FL — SIGNIFICANT CHANGE UP (ref 80–100)
MONOCYTES # BLD AUTO: 0.65 K/UL — SIGNIFICANT CHANGE UP (ref 0–0.9)
MONOCYTES NFR BLD AUTO: 9.6 % — SIGNIFICANT CHANGE UP (ref 2–14)
NEUTROPHILS # BLD AUTO: 3.19 K/UL — SIGNIFICANT CHANGE UP (ref 1.8–7.4)
NEUTROPHILS NFR BLD AUTO: 47.1 % — SIGNIFICANT CHANGE UP (ref 43–77)
NRBC # BLD: 0 /100 WBCS — SIGNIFICANT CHANGE UP (ref 0–0)
NRBC # FLD: 0 K/UL — SIGNIFICANT CHANGE UP (ref 0–0)
PHOSPHATE SERPL-MCNC: 3.6 MG/DL — SIGNIFICANT CHANGE UP (ref 2.5–4.5)
PLATELET # BLD AUTO: 286 K/UL — SIGNIFICANT CHANGE UP (ref 150–400)
POTASSIUM SERPL-MCNC: 4.1 MMOL/L — SIGNIFICANT CHANGE UP (ref 3.5–5.3)
POTASSIUM SERPL-SCNC: 4.1 MMOL/L — SIGNIFICANT CHANGE UP (ref 3.5–5.3)
PROT SERPL-MCNC: 7.4 G/DL — SIGNIFICANT CHANGE UP (ref 6–8.3)
RBC # BLD: 2.91 M/UL — LOW (ref 4.2–5.8)
RBC # FLD: 15.9 % — HIGH (ref 10.3–14.5)
SODIUM SERPL-SCNC: 139 MMOL/L — SIGNIFICANT CHANGE UP (ref 135–145)
WBC # BLD: 6.78 K/UL — SIGNIFICANT CHANGE UP (ref 3.8–10.5)
WBC # FLD AUTO: 6.78 K/UL — SIGNIFICANT CHANGE UP (ref 3.8–10.5)

## 2024-03-21 PROCEDURE — 93010 ELECTROCARDIOGRAM REPORT: CPT

## 2024-03-21 PROCEDURE — 99233 SBSQ HOSP IP/OBS HIGH 50: CPT | Mod: FS

## 2024-03-21 RX ADMIN — CHLORHEXIDINE GLUCONATE 1 APPLICATION(S): 213 SOLUTION TOPICAL at 12:32

## 2024-03-21 RX ADMIN — Medication 125 MILLIGRAM(S): at 05:40

## 2024-03-21 RX ADMIN — TICAGRELOR 60 MILLIGRAM(S): 90 TABLET ORAL at 05:41

## 2024-03-21 RX ADMIN — POLYETHYLENE GLYCOL 3350 17 GRAM(S): 17 POWDER, FOR SOLUTION ORAL at 12:33

## 2024-03-21 RX ADMIN — QUETIAPINE FUMARATE 25 MILLIGRAM(S): 200 TABLET, FILM COATED ORAL at 07:11

## 2024-03-21 RX ADMIN — CHLORHEXIDINE GLUCONATE 15 MILLILITER(S): 213 SOLUTION TOPICAL at 05:40

## 2024-03-21 RX ADMIN — LEVETIRACETAM 250 MILLIGRAM(S): 250 TABLET, FILM COATED ORAL at 17:16

## 2024-03-21 RX ADMIN — TICAGRELOR 60 MILLIGRAM(S): 90 TABLET ORAL at 17:16

## 2024-03-21 RX ADMIN — Medication 125 MILLIGRAM(S): at 22:10

## 2024-03-21 RX ADMIN — Medication 125 MILLIGRAM(S): at 14:10

## 2024-03-21 RX ADMIN — Medication 1 DROP(S): at 17:17

## 2024-03-21 RX ADMIN — HEPARIN SODIUM 5000 UNIT(S): 5000 INJECTION INTRAVENOUS; SUBCUTANEOUS at 22:09

## 2024-03-21 RX ADMIN — LEVETIRACETAM 250 MILLIGRAM(S): 250 TABLET, FILM COATED ORAL at 05:41

## 2024-03-21 RX ADMIN — Medication 1 GRAM(S): at 17:16

## 2024-03-21 RX ADMIN — Medication 25 MILLIGRAM(S): at 22:18

## 2024-03-21 RX ADMIN — ESCITALOPRAM OXALATE 10 MILLIGRAM(S): 10 TABLET, FILM COATED ORAL at 14:11

## 2024-03-21 RX ADMIN — Medication 1 GRAM(S): at 05:40

## 2024-03-21 RX ADMIN — Medication 20 MILLIGRAM(S): at 05:41

## 2024-03-21 RX ADMIN — Medication 2: at 12:31

## 2024-03-21 RX ADMIN — Medication 3 MILLILITER(S): at 21:13

## 2024-03-21 RX ADMIN — QUETIAPINE FUMARATE 75 MILLIGRAM(S): 200 TABLET, FILM COATED ORAL at 22:09

## 2024-03-21 RX ADMIN — CARVEDILOL PHOSPHATE 6.25 MILLIGRAM(S): 80 CAPSULE, EXTENDED RELEASE ORAL at 17:16

## 2024-03-21 RX ADMIN — Medication 81 MILLIGRAM(S): at 12:32

## 2024-03-21 RX ADMIN — Medication 6 MILLIGRAM(S): at 22:10

## 2024-03-21 RX ADMIN — Medication 2 MILLIGRAM(S): at 22:10

## 2024-03-21 RX ADMIN — PANTOPRAZOLE SODIUM 40 MILLIGRAM(S): 20 TABLET, DELAYED RELEASE ORAL at 17:16

## 2024-03-21 RX ADMIN — Medication 1 DROP(S): at 05:42

## 2024-03-21 RX ADMIN — HEPARIN SODIUM 5000 UNIT(S): 5000 INJECTION INTRAVENOUS; SUBCUTANEOUS at 05:40

## 2024-03-21 RX ADMIN — CHLORHEXIDINE GLUCONATE 15 MILLILITER(S): 213 SOLUTION TOPICAL at 17:17

## 2024-03-21 RX ADMIN — INSULIN GLARGINE 14 UNIT(S): 100 INJECTION, SOLUTION SUBCUTANEOUS at 12:31

## 2024-03-21 RX ADMIN — Medication 1 APPLICATION(S): at 22:10

## 2024-03-21 RX ADMIN — Medication 3 MILLILITER(S): at 08:40

## 2024-03-21 RX ADMIN — Medication 1 DROP(S): at 12:31

## 2024-03-21 RX ADMIN — CARVEDILOL PHOSPHATE 6.25 MILLIGRAM(S): 80 CAPSULE, EXTENDED RELEASE ORAL at 05:40

## 2024-03-21 RX ADMIN — HEPARIN SODIUM 5000 UNIT(S): 5000 INJECTION INTRAVENOUS; SUBCUTANEOUS at 14:11

## 2024-03-21 RX ADMIN — PANTOPRAZOLE SODIUM 40 MILLIGRAM(S): 20 TABLET, DELAYED RELEASE ORAL at 05:41

## 2024-03-21 NOTE — PHYSICAL THERAPY INITIAL EVALUATION ADULT - PERTINENT HX OF CURRENT PROBLEM, REHAB EVAL
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 Course ultimately complicated by progressive WOB and O2 demand and patient intubated and transferred to MICU (1/22). Course further complicated by acute cholecystitis and s/p Perc Lynsey with IR on (1/26), HFrEF 38, pulmonary edema, superimposed PNA, failed extubation failed and prolonged vent time and s/p trach (2/13). Patient transferred to RCU (2/16) and s/p PEG (2/20). 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 Patient admitted to medicine and course complicated by bandemia and found with SMA calcification status post diagnostic laparoscopy 12/24 and stent placement 12/25, and UGIB status post EGD (1/2). Course ultimately complicated by progressive Work Of Breathing and O2 demand and patient intubated and transferred to MICU (1/22). Course further complicated by acute cholecystitis and status post Perc Lynsey with IR on (1/26), HFrEF 38, pulmonary edema, superimposed PNA, failed extubation failed and prolonged vent time and status post trach (2/13). Patient transferred to RCU (2/16) and status post PEG (2/20). Course complicated by volume overload and diuresis and GDMT continued and HFrEF 45 with improvement

## 2024-03-21 NOTE — PHYSICAL THERAPY INITIAL EVALUATION ADULT - PATIENT PROFILE REVIEW, REHAB EVAL
yes ACTIVITY ORDER: Increase as tolerated; Spoke with RN Leia Cesar prior to PT Re-Evaluation-->Pt OK for PT; /48mmHg, heart rate 74bpm/yes

## 2024-03-21 NOTE — PHYSICAL THERAPY INITIAL EVALUATION ADULT - ACTIVE RANGE OF MOTION EXAMINATION, REHAB EVAL
bilateral upper extremity Active ROM was WFL (within functional limits)/bilateral  lower extremity Active ROM was WFL (within functional limits)
pt currently not following commands; as per pt's grandson, pt was given Seroquel earlier

## 2024-03-21 NOTE — PROGRESS NOTE ADULT - ASSESSMENT
IMPRESSION:  89M w/ DM2, HTN, CVA, PAD, CKD3, and CAD-CABG, 12/23/23 p/w COVID19 infection, c/b respiratory failure/hypotension; now s/p tracheostomy    (1)Renal - CKD3; superimposed mild prerenal azotemia - numbers fluctuating based on hemodynamic status, slightly higher   (2)CV - now off pressors and midodrine, BP improved/stable    (3)Pulm - vent-dependent   (4)ID - afebrile, s/p zosyn   (5)GI - s/p PEG (2/20)  (6)Hypernatremia - possibly due to diuresis, Na+ improved    RECOMMEND:   (1)a/w lasix 20 mg iv  PRN   (2)flush PEG as needed   (3)Continue meds for GFR 40-50ml/min  (4)last  aranesp 60 mcg Sq x 1 on  3/18/24          [facility]  Office: [phone]

## 2024-03-21 NOTE — CHART NOTE - NSCHARTNOTEFT_GEN_A_CORE
Source: Patient A&Ox [0 ]    Family [x]son      other [x] Chart review     Medical Course:    89M w/ DM2, HTN, CVA, PAD, CKD3, and CAD-CABG, 12/23/23 p/w COVID19 infection, c/b respiratory failure/hypotension; now s/p tracheostomy.     Nutrition Course: Patient asleep at the time pf visit. Pt noted with agitation and restless this morning as per chart review. At the time pf vsiti, pt's TF running at goal rate 50ml/hr as per Son by the bedside, pt is tolerating the formula. Pt noted with Banatrol BID, and bowel regimen in use? As per son, pt has no GI distress at this time, having bowel movements regularly. LBM 3/20. Recommend d/c banatraol as pt currently on bowel regimen. Pt's weight per RN flow sheet, 182.3lbs (3/10), 188.2lbs (3/3), 180.7lbs (2/25). Weight trend reflects weight fluctuations as pt noted with 1+ generalized edema on Lasix as per RN flow sheet. Pt's POCT has improved ~122-171. Continues on insulin regimen. RD to remain available for further nutritional interventions as indicated.          Diet, NPO with Tube Feed:   Tube Feeding Modality: Gastrostomy  Glucerna 1.5 [place] (GLUCERNA1.5RTH)  Total Volume for 24 Hours (mL): 1200  Continuous  Starting Tube Feed Rate {mL per Hour}: 50  Until Goal Tube Feed Rate (mL per Hour): 50  Tube Feed Duration (in Hours): 24  Tube Feed Start Time: 09:30  Banatrol TF     Qty per Day:  2 (02-24-24 @ 13:44)    TF Provides:  1800 kcals  92 gms protein  911 mL free H2O  1200 mL total volume    GI: WDL. Last BM 3/20      Anthropometrics: Height (cm): 175.3 (01-19)  Weight (kg): 79.3 (01-19)  BMI (kg/m2): 25.8 (01-19)    Edema:  Pressure Injuries:    __________________ Pertinent Medications__________________   MEDICATIONS  (STANDING):  albuterol/ipratropium for Nebulization 3 milliLiter(s) Nebulizer every 12 hours  artificial  tears Solution 1 Drop(s) Left EYE four times a day  aspirin  chewable 81 milliGRAM(s) Enteral Tube daily  carvedilol 6.25 milliGRAM(s) Oral every 12 hours  chlorhexidine 0.12% Liquid 15 milliLiter(s) Oral Mucosa every 12 hours  chlorhexidine 2% Cloths 1 Application(s) Topical daily  dextrose 5%. 1000 milliLiter(s) (100 mL/Hr) IV Continuous <Continuous>  dextrose 5%. 1000 milliLiter(s) (50 mL/Hr) IV Continuous <Continuous>  dextrose 50% Injectable 25 Gram(s) IV Push once  dextrose 50% Injectable 12.5 Gram(s) IV Push once  dextrose 50% Injectable 25 Gram(s) IV Push once  doxazosin 2 milliGRAM(s) Oral at bedtime  escitalopram Solution 10 milliGRAM(s) Oral daily  furosemide    Tablet 20 milliGRAM(s) Oral daily  glucagon  Injectable 1 milliGRAM(s) IntraMuscular once  heparin   Injectable 5000 Unit(s) SubCutaneous every 8 hours  hydrALAZINE 25 milliGRAM(s) Oral every 8 hours  insulin glargine Injectable (LANTUS) 14 Unit(s) SubCutaneous <User Schedule>  insulin lispro (ADMELOG) corrective regimen sliding scale   SubCutaneous every 6 hours  levETIRAcetam  Solution 250 milliGRAM(s) Oral two times a day  melatonin 6 milliGRAM(s) Oral at bedtime  pantoprazole   Suspension 40 milliGRAM(s) Oral every 12 hours  petrolatum Ophthalmic Ointment 1 Application(s) Left EYE at bedtime  polyethylene glycol 3350 17 Gram(s) Oral daily  QUEtiapine 75 milliGRAM(s) Oral at bedtime  QUEtiapine 25 milliGRAM(s) Oral <User Schedule>  senna Syrup 10 milliLiter(s) Oral at bedtime  sucralfate suspension 1 Gram(s) Enteral Tube two times a day  ticagrelor 60 milliGRAM(s) Oral every 12 hours  valproic  acid Syrup 125 milliGRAM(s) Oral every 8 hours    MEDICATIONS  (PRN):  acetaminophen   Oral Liquid .. 650 milliGRAM(s) Oral every 6 hours PRN Temp greater or equal to 38C (100.4F), Mild Pain (1 - 3), Moderate Pain (4 - 6)  dextrose Oral Gel 15 Gram(s) Oral once PRN Blood Glucose LESS THAN 70 milliGRAM(s)/deciliter  QUEtiapine 12.5 milliGRAM(s) Oral every 6 hours PRN agitation      __________________ Pertinent Labs__________________   03-21 Na139 mmol/L Glu 140 mg/dL<H> K+ 4.1 mmol/L Cr  1.25 mg/dL BUN 39 mg/dL<H> 03-21 Phos 3.6 mg/dL 03-21 Alb 3.1 g/dL<L>        POCT Blood Glucose.: 165 mg/dL (03-21-24 @ 11:36)  POCT Blood Glucose.: 138 mg/dL (03-21-24 @ 05:50)  POCT Blood Glucose.: 127 mg/dL (03-20-24 @ 23:02)  POCT Blood Glucose.: 133 mg/dL (03-20-24 @ 16:54)  POCT Blood Glucose.: 171 mg/dL (03-20-24 @ 11:17)  POCT Blood Glucose.: 134 mg/dL (03-20-24 @ 06:15)  POCT Blood Glucose.: 122 mg/dL (03-20-24 @ 00:43)  POCT Blood Glucose.: 87 mg/dL (03-19-24 @ 22:36)  POCT Blood Glucose.: 99 mg/dL (03-19-24 @ 17:30)  POCT Blood Glucose.: 148 mg/dL (03-19-24 @ 10:57)  POCT Blood Glucose.: 130 mg/dL (03-19-24 @ 05:50)  POCT Blood Glucose.: 147 mg/dL (03-19-24 @ 00:47)  POCT Blood Glucose.: 150 mg/dL (03-18-24 @ 17:26)          Estimated Needs:   [x ] no change since previous assessment         Previous Nutrition Diagnosis: Severe protein calorie malnutrition     Nutrition Diagnosis is [x ] ongoing        Recommendations:  1) Recommend continue with current TF regimen, Glucerna 1.5Cal @ goal rate 50ml/hr x 24 hrs to provide total volume of 1200ml/day. Please provide free water flushes via PEG daily to prevent TF dislodge and hydration.   2) Recommend consider d/c Banartrol as per medical discretion.  3) Monitor PO intake, Labs, weights, BMs, and skin integrity.   4) RD to remain available for further nutritional interventions as indicated.       Monitoring and Evaluation:      [ x] Tolerance to diet prescription [x ] weights [x ] follow up per protocol  [ ] other:

## 2024-03-21 NOTE — PROGRESS NOTE ADULT - SUBJECTIVE AND OBJECTIVE BOX
CHIEF COMPLAINT: Patient is a 89y old  Male who presents with a chief complaint of COVID19, Chest pain (20 Mar 2024 14:22)      INTERVAL EVENTS:     ROS: Seen by bedside during AM rounds     OBJECTIVE:  ICU Vital Signs Last 24 Hrs  T(C): 36.9 (21 Mar 2024 04:00), Max: 36.9 (21 Mar 2024 04:00)  T(F): 98.4 (21 Mar 2024 04:00), Max: 98.4 (21 Mar 2024 04:00)  HR: 74 (21 Mar 2024 07:13) (74 - 97)  BP: 126/48 (21 Mar 2024 04:00) (112/51 - 135/74)  BP(mean): --  ABP: --  ABP(mean): --  RR: 18 (21 Mar 2024 04:00) (16 - 20)  SpO2: 98% (21 Mar 2024 07:13) (94% - 100%)    O2 Parameters below as of 21 Mar 2024 04:00  Patient On (Oxygen Delivery Method): ventilator    O2 Concentration (%): 30      Mode: AC/ CMV (Assist Control/ Continuous Mandatory Ventilation), RR (machine): 16, TV (machine): 400, FiO2: 30, PEEP: 5, ITime: 0.64, MAP: 10, PIP: 30    03-20 @ 07:01  -  03-21 @ 07:00  --------------------------------------------------------  IN: 1240 mL / OUT: 1080 mL / NET: 160 mL      CAPILLARY BLOOD GLUCOSE      POCT Blood Glucose.: 138 mg/dL (21 Mar 2024 05:50)      PHYSICAL EXAM:  General:   HEENT:   Lymph Nodes:  Neck:   Respiratory:   Cardiovascular:   Abdomen:   Extremities:   Skin:   Neurological:  Psychiatry:    Mode: AC/ CMV (Assist Control/ Continuous Mandatory Ventilation)  RR (machine): 16  TV (machine): 400  FiO2: 30  PEEP: 5  ITime: 0.64  MAP: 10  PIP: 30      HOSPITAL MEDICATIONS:  MEDICATIONS  (STANDING):  albuterol/ipratropium for Nebulization 3 milliLiter(s) Nebulizer every 12 hours  artificial  tears Solution 1 Drop(s) Left EYE four times a day  aspirin  chewable 81 milliGRAM(s) Enteral Tube daily  carvedilol 6.25 milliGRAM(s) Oral every 12 hours  chlorhexidine 0.12% Liquid 15 milliLiter(s) Oral Mucosa every 12 hours  chlorhexidine 2% Cloths 1 Application(s) Topical daily  dextrose 5%. 1000 milliLiter(s) (100 mL/Hr) IV Continuous <Continuous>  dextrose 5%. 1000 milliLiter(s) (50 mL/Hr) IV Continuous <Continuous>  dextrose 50% Injectable 25 Gram(s) IV Push once  dextrose 50% Injectable 12.5 Gram(s) IV Push once  dextrose 50% Injectable 25 Gram(s) IV Push once  doxazosin 2 milliGRAM(s) Oral at bedtime  escitalopram Solution 10 milliGRAM(s) Oral daily  furosemide    Tablet 20 milliGRAM(s) Oral daily  glucagon  Injectable 1 milliGRAM(s) IntraMuscular once  heparin   Injectable 5000 Unit(s) SubCutaneous every 8 hours  hydrALAZINE 25 milliGRAM(s) Oral every 8 hours  insulin glargine Injectable (LANTUS) 14 Unit(s) SubCutaneous <User Schedule>  insulin lispro (ADMELOG) corrective regimen sliding scale   SubCutaneous every 6 hours  levETIRAcetam  Solution 250 milliGRAM(s) Oral two times a day  melatonin 6 milliGRAM(s) Oral at bedtime  pantoprazole   Suspension 40 milliGRAM(s) Oral every 12 hours  petrolatum Ophthalmic Ointment 1 Application(s) Left EYE at bedtime  polyethylene glycol 3350 17 Gram(s) Oral daily  QUEtiapine 75 milliGRAM(s) Oral at bedtime  QUEtiapine 25 milliGRAM(s) Oral <User Schedule>  senna Syrup 10 milliLiter(s) Oral at bedtime  sucralfate suspension 1 Gram(s) Enteral Tube two times a day  ticagrelor 60 milliGRAM(s) Oral every 12 hours  valproic  acid Syrup 125 milliGRAM(s) Oral every 8 hours    MEDICATIONS  (PRN):  acetaminophen   Oral Liquid .. 650 milliGRAM(s) Oral every 6 hours PRN Temp greater or equal to 38C (100.4F), Mild Pain (1 - 3), Moderate Pain (4 - 6)  dextrose Oral Gel 15 Gram(s) Oral once PRN Blood Glucose LESS THAN 70 milliGRAM(s)/deciliter  QUEtiapine 12.5 milliGRAM(s) Oral every 6 hours PRN agitation      LABS:                        8.5    6.78  )-----------( 286      ( 21 Mar 2024 05:32 )             26.5     03-21    139  |  99  |  39<H>  ----------------------------<  140<H>  4.1   |  29  |  1.25    Ca    8.8      21 Mar 2024 05:32  Phos  3.6     03-21  Mg     2.20     03-21    TPro  7.3  /  Alb  3.2<L>  /  TBili  0.2  /  DBili  x   /  AST  26  /  ALT  17  /  AlkPhos  112  03-20      Urinalysis Basic - ( 21 Mar 2024 05:32 )    Color: x / Appearance: x / SG: x / pH: x  Gluc: 140 mg/dL / Ketone: x  / Bili: x / Urobili: x   Blood: x / Protein: x / Nitrite: x   Leuk Esterase: x / RBC: x / WBC x   Sq Epi: x / Non Sq Epi: x / Bacteria: x         CHIEF COMPLAINT: Patient is a 89y old  Male who presents with a chief complaint of COVID19, Chest pain (20 Mar 2024 14:22)      INTERVAL EVENTS:   - Overnight patient agitated pulling at trach. Received PRN Seroquel x 1.     ROS: Seen by bedside during AM rounds. Unable to obtain 2/2 mental status.     OBJECTIVE:  ICU Vital Signs Last 24 Hrs  T(C): 36.9 (21 Mar 2024 04:00), Max: 36.9 (21 Mar 2024 04:00)  T(F): 98.4 (21 Mar 2024 04:00), Max: 98.4 (21 Mar 2024 04:00)  HR: 74 (21 Mar 2024 07:13) (74 - 97)  BP: 126/48 (21 Mar 2024 04:00) (112/51 - 135/74)  BP(mean): --  ABP: --  ABP(mean): --  RR: 18 (21 Mar 2024 04:00) (16 - 20)  SpO2: 98% (21 Mar 2024 07:13) (94% - 100%)    O2 Parameters below as of 21 Mar 2024 04:00  Patient On (Oxygen Delivery Method): ventilator    O2 Concentration (%): 30      Mode: AC/ CMV (Assist Control/ Continuous Mandatory Ventilation), RR (machine): 16, TV (machine): 400, FiO2: 30, PEEP: 5, ITime: 0.64, MAP: 10, PIP: 30    03-20 @ 07:01  -  03-21 @ 07:00  --------------------------------------------------------  IN: 1240 mL / OUT: 1080 mL / NET: 160 mL      CAPILLARY BLOOD GLUCOSE      POCT Blood Glucose.: 138 mg/dL (21 Mar 2024 05:50)      PHYSICAL EXAM:  General: NAD, pt sleeping comfortably  Neck: trach to vent, site c/d/i  Respiratory: lungs clear b/l, no rales, rhonchi or wheeze  Cardiovascular: normal s1 s2, RRR  Abdomen: soft, non distended, PEG   Extremities: no LE swelling  Skin: warm and dry  Neurological: AAO x 0   Psychiatry: Sleeping comfortably but arousable to verbal/ painful stimuli.     Mode: AC/ CMV (Assist Control/ Continuous Mandatory Ventilation)  RR (machine): 16  TV (machine): 400  FiO2: 30  PEEP: 5  ITime: 0.64  MAP: 10  PIP: 30      HOSPITAL MEDICATIONS:  MEDICATIONS  (STANDING):  albuterol/ipratropium for Nebulization 3 milliLiter(s) Nebulizer every 12 hours  artificial  tears Solution 1 Drop(s) Left EYE four times a day  aspirin  chewable 81 milliGRAM(s) Enteral Tube daily  carvedilol 6.25 milliGRAM(s) Oral every 12 hours  chlorhexidine 0.12% Liquid 15 milliLiter(s) Oral Mucosa every 12 hours  chlorhexidine 2% Cloths 1 Application(s) Topical daily  dextrose 5%. 1000 milliLiter(s) (100 mL/Hr) IV Continuous <Continuous>  dextrose 5%. 1000 milliLiter(s) (50 mL/Hr) IV Continuous <Continuous>  dextrose 50% Injectable 25 Gram(s) IV Push once  dextrose 50% Injectable 12.5 Gram(s) IV Push once  dextrose 50% Injectable 25 Gram(s) IV Push once  doxazosin 2 milliGRAM(s) Oral at bedtime  escitalopram Solution 10 milliGRAM(s) Oral daily  furosemide    Tablet 20 milliGRAM(s) Oral daily  glucagon  Injectable 1 milliGRAM(s) IntraMuscular once  heparin   Injectable 5000 Unit(s) SubCutaneous every 8 hours  hydrALAZINE 25 milliGRAM(s) Oral every 8 hours  insulin glargine Injectable (LANTUS) 14 Unit(s) SubCutaneous <User Schedule>  insulin lispro (ADMELOG) corrective regimen sliding scale   SubCutaneous every 6 hours  levETIRAcetam  Solution 250 milliGRAM(s) Oral two times a day  melatonin 6 milliGRAM(s) Oral at bedtime  pantoprazole   Suspension 40 milliGRAM(s) Oral every 12 hours  petrolatum Ophthalmic Ointment 1 Application(s) Left EYE at bedtime  polyethylene glycol 3350 17 Gram(s) Oral daily  QUEtiapine 75 milliGRAM(s) Oral at bedtime  QUEtiapine 25 milliGRAM(s) Oral <User Schedule>  senna Syrup 10 milliLiter(s) Oral at bedtime  sucralfate suspension 1 Gram(s) Enteral Tube two times a day  ticagrelor 60 milliGRAM(s) Oral every 12 hours  valproic  acid Syrup 125 milliGRAM(s) Oral every 8 hours    MEDICATIONS  (PRN):  acetaminophen   Oral Liquid .. 650 milliGRAM(s) Oral every 6 hours PRN Temp greater or equal to 38C (100.4F), Mild Pain (1 - 3), Moderate Pain (4 - 6)  dextrose Oral Gel 15 Gram(s) Oral once PRN Blood Glucose LESS THAN 70 milliGRAM(s)/deciliter  QUEtiapine 12.5 milliGRAM(s) Oral every 6 hours PRN agitation      LABS:                        8.5    6.78  )-----------( 286      ( 21 Mar 2024 05:32 )             26.5     03-21    139  |  99  |  39<H>  ----------------------------<  140<H>  4.1   |  29  |  1.25    Ca    8.8      21 Mar 2024 05:32  Phos  3.6     03-21  Mg     2.20     03-21    TPro  7.3  /  Alb  3.2<L>  /  TBili  0.2  /  DBili  x   /  AST  26  /  ALT  17  /  AlkPhos  112  03-20      Urinalysis Basic - ( 21 Mar 2024 05:32 )    Color: x / Appearance: x / SG: x / pH: x  Gluc: 140 mg/dL / Ketone: x  / Bili: x / Urobili: x   Blood: x / Protein: x / Nitrite: x   Leuk Esterase: x / RBC: x / WBC x   Sq Epi: x / Non Sq Epi: x / Bacteria: x

## 2024-03-21 NOTE — PHYSICAL THERAPY INITIAL EVALUATION ADULT - ADDITIONAL COMMENTS
Pt is a poor historian; information regarding pt's prior level of function was obtained from pt's grandson at bedside. Pt lives in a private house with his family; stair lift inside, ramp outside. Pt required 2 person assist from bed<->wheelchair by pivoting. Prior to pt getting COVID, pt was ambulating with assistance using a rolling walker. Pt has had no recent falls. Pt has Home health aide services, 41 hours per week.    Pt left comfortable in bed, HOB 30 degrees, NAD, all lines intact, all precautions maintained, with call bell in reach, grandson at bedside, and RN aware.
Per patient and grandson at bedside, Pt lives with family in a house, +chair lift. Pt requires assistance with ADLs. Pt's grandson reports Pt does not ambulate and only transfers from bed <--> wheelchair with assistance. Pt owns rolling walker but grandson reports Pt has not walked in years.     Pt left semi-supine in no apparent distress, +lines/tubes intact, +call ngo. ANNY mcdonough. Grandson at bedside.

## 2024-03-21 NOTE — PHYSICAL THERAPY INITIAL EVALUATION ADULT - DIAGNOSIS, PT EVAL
Pt presents with decreased strength and decreased functional mobility.
Deconditioning, impaired balance and strength

## 2024-03-21 NOTE — PROGRESS NOTE ADULT - SUBJECTIVE AND OBJECTIVE BOX
Aashish Boyer MD  Interventional Cardiology / Advance Heart Failure and Cardiac Transplant Specialist  [place] Office : [address] [place] [id number]  Tel:   [place] Office : [address] [facility] [address]  Tel: [phone]      Subjective/Overnight events: Pt is lying in bed in RCU    	  MEDICATIONS:  aspirin  chewable 81 milliGRAM(s) Enteral Tube daily  carvedilol 6.25 milliGRAM(s) Oral every 12 hours  doxazosin 2 milliGRAM(s) Oral at bedtime  furosemide    Tablet 20 milliGRAM(s) Oral daily  heparin   Injectable 5000 Unit(s) SubCutaneous every 8 hours  hydrALAZINE 25 milliGRAM(s) Oral every 8 hours  ticagrelor 60 milliGRAM(s) Oral every 12 hours      albuterol/ipratropium for Nebulization 3 milliLiter(s) Nebulizer every 12 hours    acetaminophen   Oral Liquid .. 650 milliGRAM(s) Oral every 6 hours PRN  escitalopram Solution 10 milliGRAM(s) Oral daily  levETIRAcetam  Solution 250 milliGRAM(s) Oral two times a day  melatonin 6 milliGRAM(s) Oral at bedtime  QUEtiapine 75 milliGRAM(s) Oral at bedtime  QUEtiapine 12.5 milliGRAM(s) Oral every 6 hours PRN  QUEtiapine 25 milliGRAM(s) Oral <User Schedule>  valproic  acid Syrup 125 milliGRAM(s) Oral every 8 hours    pantoprazole   Suspension 40 milliGRAM(s) Oral every 12 hours  polyethylene glycol 3350 17 Gram(s) Oral daily  senna Syrup 10 milliLiter(s) Oral at bedtime  sucralfate suspension 1 Gram(s) Enteral Tube two times a day    dextrose 50% Injectable 25 Gram(s) IV Push once  dextrose 50% Injectable 12.5 Gram(s) IV Push once  dextrose 50% Injectable 25 Gram(s) IV Push once  dextrose Oral Gel 15 Gram(s) Oral once PRN  glucagon  Injectable 1 milliGRAM(s) IntraMuscular once  insulin glargine Injectable (LANTUS) 14 Unit(s) SubCutaneous <User Schedule>  insulin lispro (ADMELOG) corrective regimen sliding scale   SubCutaneous every 6 hours    artificial  tears Solution 1 Drop(s) Left EYE four times a day  chlorhexidine 0.12% Liquid 15 milliLiter(s) Oral Mucosa every 12 hours  chlorhexidine 2% Cloths 1 Application(s) Topical daily  dextrose 5%. 1000 milliLiter(s) IV Continuous <Continuous>  dextrose 5%. 1000 milliLiter(s) IV Continuous <Continuous>  petrolatum Ophthalmic Ointment 1 Application(s) Left EYE at bedtime      PAST MEDICAL/SURGICAL HISTORY  PAST MEDICAL & SURGICAL HISTORY:  Hyperlipemia      Hypertension      Coronary Artery Disease      Diabetes Mellitus Type II      Stented Coronary Artery  total 5 stents, last stent 5/2019      Neuropathy      Myocardial infarction      Stroke  mild left facial numbness   no other residuals verbalized      Myoclonic jerking      Stage 3 chronic kidney disease      History of Cataract Extraction      Hx of CABG      H/O coronary angiogram      S/P coronary artery stent placement  1/6/09      S/P placement of cardiac pacemaker          SOCIAL HISTORY: Substance Use (street drugs): ( x ) never used  (  ) other:    FAMILY HISTORY:  No pertinent family history in first degree relatives      PHYSICAL EXAM:  T(C): 36.9 (03-21-24 @ 12:00), Max: 36.9 (03-21-24 @ 04:00)  HR: 74 (03-21-24 @ 12:00) (72 - 97)  BP: 123/48 (03-21-24 @ 12:00) (112/51 - 138/57)  RR: 18 (03-21-24 @ 12:00) (16 - 20)  SpO2: 98% (03-21-24 @ 12:00) (94% - 100%)  Wt(kg): --  I&O's Summary    20 Mar 2024 07:01  -  21 Mar 2024 07:00  --------------------------------------------------------  IN: 1240 mL / OUT: 1080 mL / NET: 160 mL      GENERAL: s/p trach  EYES: conjunctiva and sclera clear  ENMT:   Moist mucous membranes, Good dentition, No lesions  Cardiovascular: Normal S1 S2, No JVD, No murmurs, No edema  Respiratory: Lungs clear to auscultation	  Gastrointestinal:  s/p PEG   Extremities: no edema                                  8.5    6.78  )-----------( 286      ( 21 Mar 2024 05:32 )             26.5     03-21    139  |  99  |  39<H>  ----------------------------<  140<H>  4.1   |  29  |  1.25    Ca    8.8      21 Mar 2024 05:32  Phos  3.6     03-21  Mg     2.20     03-21    TPro  7.4  /  Alb  3.1<L>  /  TBili  0.3  /  DBili  <0.2  /  AST  21  /  ALT  15  /  AlkPhos  120  03-21    proBNP:   Lipid Profile:   HgA1c:   TSH:     Consultant(s) Notes Reviewed:  [x ] YES  [ ] NO    Care Discussed with Consultants/Other Providers [ x] YES  [ ] NO    Imaging Personally Reviewed independently:  [x] YES  [ ] NO    All labs, radiologic studies, vitals, orders and medications list reviewed. Patient is seen and examined at bedside. Case discussed with medical team.

## 2024-03-21 NOTE — PHYSICAL THERAPY INITIAL EVALUATION ADULT - NSPTDISCHREC_GEN_A_CORE
Will Trial pt 1-2 more times to see if pt is an appropriate candidate for skilled PT; please follow PT notes

## 2024-03-21 NOTE — PHYSICAL THERAPY INITIAL EVALUATION ADULT - GENERAL OBSERVATIONS, REHAB EVAL
Pt encountered in semisupine position, no distress, lethargic, with +IV, +pulse oximeter, +PEG, +trach to mechanical vent, and grandson at bedside.
Pt received semi-supine in no apparent distress, +telemetry, +delong, agreeable to participate. HR 89-90 BPM. ANNY Lima at bedside.

## 2024-03-21 NOTE — PROGRESS NOTE ADULT - ASSESSMENT
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 Course ultimately complicated by progressive WOB and O2 demand and patient intubated and transferred to MICU (1/22). Course further complicated by acute cholecystitis and s/p Perc Lynsey with IR on (1/26), HFrEF 38, pulmonary edema, superimposed PNA, failed extubation failed and prolonged vent time and s/p trach (2/13). Patient transferred to RCU (2/16) and s/p PEG (2/20). Course complicated by volume overload and diuresis and GDMT continued and HFrEF 45 with improvement    NEUROLOGY   # Encephalopathy   - Hx of CVA with no residual deficits per family, however per family worsening confusion at home over the past 6 months (becoming disoriented to time) but prior to admission has been more confused, talking about seeing people that are not present.  - CTH (1/31) with no evidence of acute infarct  - Continue on ASA and Brilinta  - Holding Neurontin, Memantine and Oxycodone in setting of somnolence  3/17: +obtuneded overnight, CT Head: Mild chronic microvascular changes without evidence of an acute transcortical infarction or hemorrhage.  - VBG and Ammonia level grossly unremarkable  - Much awake and alert in the afternoon, agitated, will consider Seroquel/low dose Ativan if needed   - 3/18 agitated in am seen by  and bid seroquel with good results       # ICA stenosis   - CTA NECK (1/31) with moderate to severe narrowing left internal carotid at the origin. Severe narrowing right internal carotid by a tandem lesion 1.5 cm above the bifurcation with a narrowed internal carotid beyond the lesion. Extensive calcified plaque both cavernous and supraclinoid carotid arteries with narrowing but no occlusion. Mild narrowing proximal right M1 segment. Moderate narrowing both vertebral V4 segments. No perfusion abnormality at the Tmax 6 second threshold, but moderate hypoperfusion in the right MCA territory at the 4 second threshold.   - Continue on ASA and Brilinta    # Myoclonic jerks   - Hx of myoclonic jerks post CVAs   - EEG (1/18-2/6) negative for seizures  - Continue on Keppra and per neuro/ psych wishes to wean, family in agreement   - Currently down titrating Keppra 500 mg BID, now Keppra 250 mg BID 3/13 - )    # Depression/ Insomnia  - Continue on Lexapro per home medication   - Course complicated by agitation and pulling on tubes   - Home Seroquel discontinued as patient now on Valproic acid syrup   - S/p Ativan 0.5mg PO Q6H PRN and Haldol 0.25 mg Q6H for severe agitation, then s/p IM Zypexa 1.25 Q8H PRN for agitation    - Supportive care   - s/p Seroquel 12.5 x 1 w no significant improvement, still agitated, then Ativan 0.25mg x1   - Will monitor closely    # Agitation in setting of delirium / underlying vascular dementia   - BH following, c/w Depakene 125mg q8hr  - monitor platelets, LFTs while on Depakote   - C/w VPA TID (250mg QAM, 125mg afternoon, 375mg QHS)  - Recently, Pt had responded much better to Ativan than Zyprexa for agitation, however became obtunded, so Ativan d/c Ativan 0.5mg Q6H PRN  - FU by  today still requires CO /hand holding to prevent trach manipulation   - Start seroquel bid 25mg/50mg   - 3/20 restless /agitated overnight ativan x 2 no effect - DW psych can do seroquel 12.5 prn /increase night seroquel to 75mg -continues on 1:1 restless with  family    CARDIOLOGY   # Vasoplegic vs septic shock  # Hx of HTN   - Weaned off pressors in ICU and now with mild hypertension   - Continue on coreg and hydral as below   - Strict BP control given moderate AS  - Increased vascular congestion on CXR (3/9) so will give additional Lasix 20mg PO x1 (3/10)  - Diastolic bp low <50 today hold Hydralazine and monitor bp - may need to decrease of dc     # AFIB RVR   # Bifascicular Block with Medtronic PPM   - AFIB RVR episodes and PPM interrogated (2/20) by EP with similar episodes  - Previous admission in 6/2022 with cardiogenic syncope second to bifasicular block, however now with PPM and AV myron blockers ok.   - Continue on lopressor 12.5mg BID however replaced with coreg second to HFrEF   - AC discussed at length however with recent GIB will hold for now     # HFrEF 38 likely stress induced?   # Mild to moderate AS   - ECHO (12/2023) with EF 62 with normal LVSF and mild to moderate AS   - ECHO (2/2024) with EF 38, moderate LVSD with global LV hypokinesis, mildly reduced RVSF (TAPSE 1.3), mild to moderate MR, mild AR, and moderate AS.   - RPT ECHO (3/4) with EF 45 and mild to moderate LVSD   - Continue on Lasix 20 QD, coreg 6.25 and hydralazine 50 (increased 3/4)   - Hold isordil and up titrate above medications first     # CAD with CABG   - CATH (5/2022) with dLAD 70, SVG graft to OM1 with 90 in stent stenosis s/p PCI and GEMMA placement, and LIMA graft to mLAD with no disease.   - Continue on ASA and Brilinta    # Prolonged QTC (Resolved)  - ECG (3/2) with QTC 616ms (corrected using bazzet 490ms)   - ECG (3/3) with QTC 634ms (corrected using bazzet 490ms)   - ECG (3/11) with QTC 490ms   - EKG (3/15) QTc 498ms  - Monitor QTc/ daily EKGs       RESPIRATORY   # Acute respiratory failure second to SARS-COV-2 vs pulm edema vs PNA  - Presented with COVID complicated by sepsis second to acute lynsey and worsening respiratory failure second to pulmonary edema requiring intubation.   - Prolonged intubation and s/p tracheostomy (2/13)   - CT CHEST 1/16 with new small bilateral pleural effusions/ atelectasis/ patchy bilateral ground-glass opacities are consistent with pulmonary edema.  - Completed ABX and COVID regimen as below   - Continue on vent and allow SBT as tolerated   - Continue on nebs and hold further HTS given intermittent hemoptysis  - Continue on diuresis as above    # Mild hemoptysis likely from suction trauma   - s/p bronch (2/18) with no active bleed  - Hemoptysis improved with TXA and holding further HTS as above   - Tiny hemoptysis second to suction trauma imporving  - Careful with suctioning   - Recurrent hemoptysis (3/9) so ordered for TXA nebs again however hemoptysis appeared self limited and stopped before nebs even given    HEENT   # L eye subconjunctival hemorrhage   - Continue on artifical tears and Lacrilube   - Optho eval appreciated     GI  # Nutrition/ Dysphagia   - PEG placed by GI on 2/20 and tube feeds continued.   - Loose stools and Banatrol continued     # UGIB   - EGD (1/2) with esophagitis and bleeding Dieulafoy's lesion s/p clips.   - Continue on PPI and carafate     # SMA calcification   - CTA demonstrating mesenteric fat stranding associated with ascending/transverse colon  - Diagnostic laparoscopy (12/24) with small bowel and visible colon viable, some inflammation of omentum in RUQ  - SMA Angiogram and stent placed (12/25).   - Continue on ASA and Brilinta     # Acute Cholecystitis   - CT (12/26) with distended GB with cholelithiasis and sludge   - HIDA (12/29) POSITIVE for acute cholecystitis   - Case discussed with IR at length and s/p PERC LYNSEY (1/26)   - Completed zosyn course (12/24-12/31)   - PERC LYNSEY tube to stay in until surgical candidate for cholecystectomy to prevent recurrence     / RENAL   # CKD   - CRE at baseline   - Monitor renal function and UOP   - Aranesp SQ x1 (3/8)    # Hypernatremia (resolved)   - s/p  Q4H  - Monitor closely with diuresis as above   - Stop FWF (3/15) given c/f worsening vol overload     INFECTIOUS DISEASE   # COVID with superimposed PNA   # ESBL Kleb PNA   - COVID POSITIVE (12/23) and completed remdesivir x 1 dose and paxlovid course.   - WOB worsened as above requiring intubation and cultures negative. Zosyn completed empirically however hypothermic (2/11) and BCx, UA, RVP and BAL negative.   - Completed additional zosyn (2/12-2/19) empirically   - Fever spike and thick secretions and SCX (2/26) with ESBL klebs.   - s/p Zosyn (2/27 - 2/29) but resistant and completed Erta (2/29 - 3/6)     HEME  # AOCD   - Monitor HH   - DVT PPX with HSQ     ENDOCRINE   # DM2 A1C 9.3   - Continue on Lantus 14 and ISS     SKIN/ TUBES   - Trach (2/13)   - PEG (2/20)   - PERC LYNSEY (1/26 - )     ETHICS   - FULL CODE     DISPO - vent facility and family aware. SW with accepting facility however pending available bed.  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 Course ultimately complicated by progressive WOB and O2 demand and patient intubated and transferred to MICU (1/22). Course further complicated by acute cholecystitis and s/p Perc Lynsey with IR on (1/26), HFrEF 38, pulmonary edema, superimposed PNA, failed extubation failed and prolonged vent time and s/p trach (2/13). Patient transferred to RCU (2/16) and s/p PEG (2/20). Course complicated by volume overload and diuresis and GDMT continued and HFrEF 45 with improvement. Course further complicated by agitation from underlying vascular dementia/ hospital delirium.     NEUROLOGY   # Encephalopathy   - Hx of CVA with no residual deficits per family, however per family worsening confusion at home over the past 6 months (becoming disoriented to time) but prior to admission has been more confused, talking about seeing people that are not present.  - CTH (1/31) with no evidence of acute infarct  - Continue on ASA and Brilinta  - Holding Neurontin, Memantine and Oxycodone in setting of somnolence  - 3/17: obtunded overnight, CT Head: Mild chronic microvascular changes without evidence of an acute transcortical infarction or hemorrhage.  - VBG and Ammonia level grossly unremarkable    # ICA stenosis   - CTA NECK (1/31) with moderate to severe narrowing left internal carotid at the origin. Severe narrowing right internal carotid by a tandem lesion 1.5 cm above the bifurcation with a narrowed internal carotid beyond the lesion. Extensive calcified plaque both cavernous and supraclinoid carotid arteries with narrowing but no occlusion. Mild narrowing proximal right M1 segment. Moderate narrowing both vertebral V4 segments. No perfusion abnormality at the Tmax 6 second threshold, but moderate hypoperfusion in the right MCA territory at the 4 second threshold.   - Continue on ASA and Brilinta    # Myoclonic jerks   - Hx of myoclonic jerks post CVAs   - EEG (1/18-2/6) negative for seizures  - Continue on Keppra and per neuro/ psych wishes to wean, family in agreement   - Currently down titrating Keppra 500 mg BID, now Keppra 250 mg BID 3/13 - )    # Depression/ Insomnia  - Continue on Lexapro per home medication   - Course complicated by agitation and pulling on tubes   - Home Seroquel discontinued as patient now on Valproic acid syrup   - S/p Ativan 0.5mg PO Q6H PRN and Haldol 0.25 mg Q6H for severe agitation, then s/p IM Zypexa 1.25 Q8H PRN for agitation    - Supportive care     # Agitation in setting of delirium / underlying vascular dementia   - C/w  mg TID, BH following   - monitor platelets, LFTs while on Depakote   - Recently, Pt had responded much better to Ativan than Zyprexa for agitation, however became obtunded, so Ativan d/c Ativan 0.5mg Q6H PRN  - 3/18 Start Seroquel 25 mg in AM and Seroquel 50 mg QHS   - 3/20 restless /agitated overnight Ativan x 2 no effect - DW psych can do Seroquel 12.5 PRN/ increase night Seroquel to 75mg - continues on 1:1 restless with  family    CARDIOLOGY   # Vasoplegic vs septic shock  # Hx of HTN   - Weaned off pressors in ICU and now with mild hypertension   - Continue on coreg and hydral as below   - Strict BP control given moderate AS  - Increased vascular congestion on CXR (3/9) so will give additional Lasix 20mg PO x1 (3/10)  - Diastolic bp low <50 hold Hydralazine and monitor bp - may need to decrease of dc     # AFIB RVR   # Bifascicular Block with Sportcuttronic PPM   - AFIB RVR episodes and PPM interrogated (2/20) by EP with similar episodes  - Previous admission in 6/2022 with cardiogenic syncope second to bifasicular block, however now with PPM and AV myron blockers ok.   - Continue on lopressor 12.5mg BID however replaced with coreg second to HFrEF   - AC discussed at length however with recent GIB will hold for now     # HFrEF 38 likely stress induced?   # Mild to moderate AS   - ECHO (12/2023) with EF 62 with normal LVSF and mild to moderate AS   - ECHO (2/2024) with EF 38, moderate LVSD with global LV hypokinesis, mildly reduced RVSF (TAPSE 1.3), mild to moderate MR, mild AR, and moderate AS.   - RPT ECHO (3/4) with EF 45 and mild to moderate LVSD   - Continue on Lasix 20 QD, coreg 6.25 and hydralazine 50 (increased 3/4)   - Hold isordil and up titrate above medications first     # CAD with CABG   - CATH (5/2022) with dLAD 70, SVG graft to OM1 with 90 in stent stenosis s/p PCI and GEMMA placement, and LIMA graft to mLAD with no disease.   - Continue on ASA and Brilinta    # Prolonged QTC (Resolved)  - ECG (3/2) with QTC 616ms (corrected using bazzet 490ms)   - ECG (3/3) with QTC 634ms (corrected using bazzet 490ms)   - ECG (3/11) with QTC 490ms, EKG (3/15) QTc 498ms, EKG (3/21) 486ms  - Monitor QTc/ daily EKGs       RESPIRATORY   # Acute respiratory failure second to SARS-COV-2 vs pulm edema vs PNA  - Presented with COVID complicated by sepsis second to acute lynsey and worsening respiratory failure second to pulmonary edema requiring intubation.   - Prolonged intubation and s/p tracheostomy (2/13)   - CT CHEST 1/16 with new small bilateral pleural effusions/ atelectasis/ patchy bilateral ground-glass opacities are consistent with pulmonary edema.  - Completed ABX and COVID regimen as below   - Continue on vent and allow SBT as tolerated   - Continue on nebs and hold further HTS given intermittent hemoptysis  - Continue on diuresis as above    # Mild hemoptysis likely from suction trauma   - s/p bronch (2/18) with no active bleed  - Hemoptysis improved with TXA and holding further HTS as above   - Tiny hemoptysis second to suction trauma imporving  - Careful with suctioning   - Recurrent hemoptysis (3/9) so ordered for TXA nebs again however hemoptysis appeared self limited and stopped before nebs even given    HEENT   # L eye subconjunctival hemorrhage   - Continue on artifical tears and Lacrilube   - Optho eval appreciated     GI  # Nutrition/ Dysphagia   - PEG placed by GI on 2/20 and tube feeds continued.   - Loose stools and Banatrol continued     # UGIB   - EGD (1/2) with esophagitis and bleeding Dieulafoy's lesion s/p clips.   - Continue on PPI and carafate     # SMA calcification   - CTA demonstrating mesenteric fat stranding associated with ascending/transverse colon  - Diagnostic laparoscopy (12/24) with small bowel and visible colon viable, some inflammation of omentum in RUQ  - SMA Angiogram and stent placed (12/25).   - Continue on ASA and Brilinta     # Acute Cholecystitis   - CT (12/26) with distended GB with cholelithiasis and sludge   - HIDA (12/29) POSITIVE for acute cholecystitis   - Case discussed with IR at length and s/p PERC LYNSEY (1/26)   - Completed zosyn course (12/24-12/31)   - PERC LYNSEY tube to stay in until surgical candidate for cholecystectomy to prevent recurrence     / RENAL   # CKD   - CRE at baseline   - Monitor renal function and UOP   - Aranesp SQ x1 (3/8)    # Hypernatremia (resolved)   - s/p  Q4H  - Monitor closely with diuresis as above   - Stop FWF (3/15) given c/f worsening vol overload     INFECTIOUS DISEASE   # COVID with superimposed PNA   # ESBL Kleb PNA   - COVID POSITIVE (12/23) and completed remdesivir x 1 dose and paxlovid course.   - WOB worsened as above requiring intubation and cultures negative. Zosyn completed empirically however hypothermic (2/11) and BCx, UA, RVP and BAL negative.   - Completed additional zosyn (2/12-2/19) empirically   - Fever spike and thick secretions and SCX (2/26) with ESBL klebs.   - s/p Zosyn (2/27 - 2/29) but resistant and completed Erta (2/29 - 3/6)     HEME  # AOCD   - Monitor HH   - DVT PPX with HSQ     ENDOCRINE   # DM2 A1C 9.3   - Continue on Lantus 14 and ISS     SKIN/ TUBES   - Trach (2/13)   - PEG (2/20)   - PERC LYNSEY (1/26 - )     ETHICS   - FULL CODE     DISPO - vent facility and family aware. SW with accepting facility however pending available bed.

## 2024-03-21 NOTE — PROGRESS NOTE ADULT - NS ATTEND AMEND GEN_ALL_CORE FT
Critical Care Daily Progress Note    Subjective:     Admission Date: 6/25/2022     Complaint:  HD #3 Respiratory distress secondary to RSV bronchiolitis    Interval history:    - RSV bronchiolitis: On 6L HFNC, 50%. Requires more oxygen during sleep.  - Reactive airway disease: Questionable asthma, but strong family history. Receiving low dose albuterol q4 hours and steroids.  - RML pneumonia: Called on Xray. On ceftriaxone for a 5 day course (3/5). - Nutrition: General diet, no fluids, tolerating well    Current Facility-Administered Medications   Medication Dose Route Frequency    cefTRIAXone (ROCEPHIN) 535.2 mg in 0.9% sodium chloride 13.38 mL IV syringe  50 mg/kg IntraVENous Q24H    ibuprofen (ADVIL;MOTRIN) 100 mg/5 mL oral suspension 107 mg  10 mg/kg Oral Q6H PRN    albuterol (PROVENTIL VENTOLIN) nebulizer solution 1.25 mg  1.25 mg Nebulization Q4H    sodium chloride (AYR SALINE) 0.65 % nasal drops 2 Drop  2 Drop Both Nostrils Q2H PRN    acetaminophen (TYLENOL) solution 160.32 mg  15 mg/kg Oral Q6H PRN         Objective:     Visit Vitals  /99 (BP 1 Location: Left leg, BP Patient Position: At rest)   Pulse 142   Temp 97.6 °F (36.4 °C)   Resp 34   Ht (!) 0.762 m   Wt 10.7 kg   HC 47 cm   SpO2 94%   BMI 18.43 kg/m²       Intake and Output:     Intake/Output Summary (Last 24 hours) at 6/28/2022 1457  Last data filed at 6/27/2022 1500  Gross per 24 hour   Intake 120 ml   Output --   Net 120 ml     Physical Exam:   Gen: Happy, active, and playful, NAD  HEENT: NCAT MMM, no conjunctival injections, few erupted teeth  Resp: Coarse breath sounds with good AE bilaterally. no wheeze or rales.  No retractions, mild intermittent tachypnea  CVS: S1S2 RRR no murmur/gallop/rub, cap refill < 2 seconds, good peripheral pulses  Abd: Soft distended tympanic +BS no HSM, non tender  Ext: Moves all extremities, no C/C/E  Neuro: Alert and awake, no asymmetry    Hemodynamics:  PIV in place    Oxygen Therapy:    Oxygen Therapy  O2 Sat (%): 94 % (06/28/22 1119)  Pulse via Oximetry: 144 beats per minute (06/28/22 1119)  O2 Device: Hi flow nasal cannula; Heated (06/28/22 1119)  Skin Assessment: Clean, dry, & intact (06/28/22 0844)  O2 Flow Rate (L/min): 2 l/min (06/28/22 1119)  O2 Temperature: 87.3 °F (30.7 °C) (06/28/22 1119)  FIO2 (%): 100 % (06/28/22 1119)11 m.o. Assessment:   6 m.o. male who is admitted with acute hypoxemic respiratory failure secondary to RSV bronchiolitis. Patient at risk for acute life threatening deterioration requiring immediate life saving interventions and therefore requires admission in the PICU. Principal Problem:    Acute respiratory failure ([address] (6/25/2022)    Active Problems:    CAP (community acquired pneumonia) (6/25/2022)      Reactive airway disease (6/25/2022)      RSV infection (6/26/2022)        Plan:   Resp:   Wean to 2L, 100% this morning and then room air this afternoon  Continue Albuterol Q 4H  Continue steroids Day 4/5    CV: monitor    Heme: H/H normal on CBC. No need to repeat. ID: continue ceftriaxone 50mg/kg for 5 days for pneumonia (6/25-)    FEN: General diet. Eating and drinking well, no IVFs needed at this time.     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  Course further complicated by acute cholecystitis and s/p Perc Lynsey with IR on (1/26), HFrEF 38, pulmonary edema, superimposed PNA, hypoxic respiratory failure reqiuring intubation and prolonged vent time and s/p trach (2/13). Patient transferred to RCU (2/16) and s/p PEG (2/20).  Course further complicated by agitation from underlying vascular dementia/ hospital delirium.       Neuro: hx of CVA, encephalopathy. Currently awake, periods of intermittent agitation, on 1:1. On Depakote 125 q8h. Keppra being tapered as well - now on 250 q12h. Appreciate behavioral health input.  Seroquel resumed with good effect - titratig up as tolerated. Monitoring QTc  CVS:  CAD, Afib, HFrEF - TTE 45% 3/4, coreg, hydralazine, not on AC due to GIB, lasix 20 mg daily, on ASA and Brillinta   GI: on PPI and carafate  Resp: on VCAC - trials of PS daily.  ID: off antibiotics, completed etrapenem on 3/6, recently treated w/ Klebsiella PNA.   rest of plan as above.

## 2024-03-21 NOTE — PHYSICAL THERAPY INITIAL EVALUATION ADULT - GROSSLY INTACT, SENSORY
unable to assess secondary to lethargy and pt being trached
Left UE/Right UE/Left LE/Right LE/Grossly Intact

## 2024-03-21 NOTE — PHYSICAL THERAPY INITIAL EVALUATION ADULT - PLANNED THERAPY INTERVENTIONS, PT EVAL
balance training/bed mobility training/neuromuscular re-education/postural re-education/strengthening/transfer training
balance training/bed mobility training/gait training/strengthening/stretching/transfer training

## 2024-03-21 NOTE — PHYSICAL THERAPY INITIAL EVALUATION ADULT - MANUAL MUSCLE TESTING RESULTS, REHAB EVAL
bilateral lower extremities 3/5, bilateral lower extremities 2+/5/grossly assessed due to
Pt currently not following commands./not tested due to

## 2024-03-21 NOTE — PHYSICAL THERAPY INITIAL EVALUATION ADULT - FOLLOWS COMMANDS/ANSWERS QUESTIONS, REHAB EVAL
75% of the time/able to follow single-step instructions
unable to follow commands/unable to answer questions

## 2024-03-21 NOTE — PHYSICAL THERAPY INITIAL EVALUATION ADULT - PRECAUTIONS/LIMITATIONS, REHAB EVAL
aspiration precautions/cardiac precautions/fall precautions/oxygen therapy device and L/min
aspiration precautions/fall precautions

## 2024-03-22 LAB
ALBUMIN SERPL ELPH-MCNC: 2.9 G/DL — LOW (ref 3.3–5)
ALP SERPL-CCNC: 115 U/L — SIGNIFICANT CHANGE UP (ref 40–120)
ALT FLD-CCNC: 11 U/L — SIGNIFICANT CHANGE UP (ref 4–41)
ANION GAP SERPL CALC-SCNC: 9 MMOL/L — SIGNIFICANT CHANGE UP (ref 7–14)
AST SERPL-CCNC: 19 U/L — SIGNIFICANT CHANGE UP (ref 4–40)
BILIRUB SERPL-MCNC: 0.2 MG/DL — SIGNIFICANT CHANGE UP (ref 0.2–1.2)
BUN SERPL-MCNC: 42 MG/DL — HIGH (ref 7–23)
CALCIUM SERPL-MCNC: 8.8 MG/DL — SIGNIFICANT CHANGE UP (ref 8.4–10.5)
CHLORIDE SERPL-SCNC: 101 MMOL/L — SIGNIFICANT CHANGE UP (ref 98–107)
CO2 SERPL-SCNC: 31 MMOL/L — SIGNIFICANT CHANGE UP (ref 22–31)
CREAT SERPL-MCNC: 1.26 MG/DL — SIGNIFICANT CHANGE UP (ref 0.5–1.3)
EGFR: 54 ML/MIN/1.73M2 — LOW
GLUCOSE BLDC GLUCOMTR-MCNC: 113 MG/DL — HIGH (ref 70–99)
GLUCOSE BLDC GLUCOMTR-MCNC: 114 MG/DL — HIGH (ref 70–99)
GLUCOSE BLDC GLUCOMTR-MCNC: 130 MG/DL — HIGH (ref 70–99)
GLUCOSE BLDC GLUCOMTR-MCNC: 151 MG/DL — HIGH (ref 70–99)
GLUCOSE BLDC GLUCOMTR-MCNC: 166 MG/DL — HIGH (ref 70–99)
GLUCOSE SERPL-MCNC: 113 MG/DL — HIGH (ref 70–99)
HCT VFR BLD CALC: 25.9 % — LOW (ref 39–50)
HGB BLD-MCNC: 8.4 G/DL — LOW (ref 13–17)
MAGNESIUM SERPL-MCNC: 2.3 MG/DL — SIGNIFICANT CHANGE UP (ref 1.6–2.6)
MCHC RBC-ENTMCNC: 29.2 PG — SIGNIFICANT CHANGE UP (ref 27–34)
MCHC RBC-ENTMCNC: 32.4 GM/DL — SIGNIFICANT CHANGE UP (ref 32–36)
MCV RBC AUTO: 89.9 FL — SIGNIFICANT CHANGE UP (ref 80–100)
NRBC # BLD: 0 /100 WBCS — SIGNIFICANT CHANGE UP (ref 0–0)
NRBC # FLD: 0 K/UL — SIGNIFICANT CHANGE UP (ref 0–0)
PHOSPHATE SERPL-MCNC: 3.6 MG/DL — SIGNIFICANT CHANGE UP (ref 2.5–4.5)
PLATELET # BLD AUTO: 313 K/UL — SIGNIFICANT CHANGE UP (ref 150–400)
POTASSIUM SERPL-MCNC: 4.4 MMOL/L — SIGNIFICANT CHANGE UP (ref 3.5–5.3)
POTASSIUM SERPL-SCNC: 4.4 MMOL/L — SIGNIFICANT CHANGE UP (ref 3.5–5.3)
PROT SERPL-MCNC: 7 G/DL — SIGNIFICANT CHANGE UP (ref 6–8.3)
RBC # BLD: 2.88 M/UL — LOW (ref 4.2–5.8)
RBC # FLD: 15.7 % — HIGH (ref 10.3–14.5)
SODIUM SERPL-SCNC: 141 MMOL/L — SIGNIFICANT CHANGE UP (ref 135–145)
WBC # BLD: 7.46 K/UL — SIGNIFICANT CHANGE UP (ref 3.8–10.5)
WBC # FLD AUTO: 7.46 K/UL — SIGNIFICANT CHANGE UP (ref 3.8–10.5)

## 2024-03-22 PROCEDURE — 99232 SBSQ HOSP IP/OBS MODERATE 35: CPT

## 2024-03-22 PROCEDURE — 99233 SBSQ HOSP IP/OBS HIGH 50: CPT | Mod: FS

## 2024-03-22 RX ADMIN — HEPARIN SODIUM 5000 UNIT(S): 5000 INJECTION INTRAVENOUS; SUBCUTANEOUS at 22:01

## 2024-03-22 RX ADMIN — Medication 1 DROP(S): at 00:41

## 2024-03-22 RX ADMIN — Medication 25 MILLIGRAM(S): at 22:00

## 2024-03-22 RX ADMIN — CARVEDILOL PHOSPHATE 6.25 MILLIGRAM(S): 80 CAPSULE, EXTENDED RELEASE ORAL at 05:51

## 2024-03-22 RX ADMIN — Medication 20 MILLIGRAM(S): at 05:51

## 2024-03-22 RX ADMIN — INSULIN GLARGINE 14 UNIT(S): 100 INJECTION, SOLUTION SUBCUTANEOUS at 11:17

## 2024-03-22 RX ADMIN — Medication 25 MILLIGRAM(S): at 05:51

## 2024-03-22 RX ADMIN — Medication 2 MILLIGRAM(S): at 22:00

## 2024-03-22 RX ADMIN — Medication 2: at 11:18

## 2024-03-22 RX ADMIN — CHLORHEXIDINE GLUCONATE 1 APPLICATION(S): 213 SOLUTION TOPICAL at 11:32

## 2024-03-22 RX ADMIN — Medication 1 GRAM(S): at 17:28

## 2024-03-22 RX ADMIN — Medication 1 DROP(S): at 05:52

## 2024-03-22 RX ADMIN — Medication 3 MILLILITER(S): at 07:24

## 2024-03-22 RX ADMIN — PANTOPRAZOLE SODIUM 40 MILLIGRAM(S): 20 TABLET, DELAYED RELEASE ORAL at 17:29

## 2024-03-22 RX ADMIN — Medication 125 MILLIGRAM(S): at 13:22

## 2024-03-22 RX ADMIN — QUETIAPINE FUMARATE 25 MILLIGRAM(S): 200 TABLET, FILM COATED ORAL at 07:47

## 2024-03-22 RX ADMIN — TICAGRELOR 60 MILLIGRAM(S): 90 TABLET ORAL at 05:51

## 2024-03-22 RX ADMIN — HEPARIN SODIUM 5000 UNIT(S): 5000 INJECTION INTRAVENOUS; SUBCUTANEOUS at 13:22

## 2024-03-22 RX ADMIN — CARVEDILOL PHOSPHATE 6.25 MILLIGRAM(S): 80 CAPSULE, EXTENDED RELEASE ORAL at 17:26

## 2024-03-22 RX ADMIN — Medication 3 MILLILITER(S): at 19:37

## 2024-03-22 RX ADMIN — Medication 2: at 17:27

## 2024-03-22 RX ADMIN — Medication 1 DROP(S): at 23:30

## 2024-03-22 RX ADMIN — CHLORHEXIDINE GLUCONATE 15 MILLILITER(S): 213 SOLUTION TOPICAL at 05:52

## 2024-03-22 RX ADMIN — Medication 125 MILLIGRAM(S): at 05:50

## 2024-03-22 RX ADMIN — QUETIAPINE FUMARATE 75 MILLIGRAM(S): 200 TABLET, FILM COATED ORAL at 22:00

## 2024-03-22 RX ADMIN — TICAGRELOR 60 MILLIGRAM(S): 90 TABLET ORAL at 17:26

## 2024-03-22 RX ADMIN — LEVETIRACETAM 250 MILLIGRAM(S): 250 TABLET, FILM COATED ORAL at 17:28

## 2024-03-22 RX ADMIN — HEPARIN SODIUM 5000 UNIT(S): 5000 INJECTION INTRAVENOUS; SUBCUTANEOUS at 05:57

## 2024-03-22 RX ADMIN — ESCITALOPRAM OXALATE 10 MILLIGRAM(S): 10 TABLET, FILM COATED ORAL at 11:25

## 2024-03-22 RX ADMIN — QUETIAPINE FUMARATE 12.5 MILLIGRAM(S): 200 TABLET, FILM COATED ORAL at 20:14

## 2024-03-22 RX ADMIN — PANTOPRAZOLE SODIUM 40 MILLIGRAM(S): 20 TABLET, DELAYED RELEASE ORAL at 05:50

## 2024-03-22 RX ADMIN — Medication 1 APPLICATION(S): at 22:01

## 2024-03-22 RX ADMIN — CHLORHEXIDINE GLUCONATE 15 MILLILITER(S): 213 SOLUTION TOPICAL at 17:28

## 2024-03-22 RX ADMIN — Medication 125 MILLIGRAM(S): at 22:01

## 2024-03-22 RX ADMIN — Medication 6 MILLIGRAM(S): at 22:00

## 2024-03-22 RX ADMIN — Medication 81 MILLIGRAM(S): at 11:25

## 2024-03-22 RX ADMIN — Medication 1 GRAM(S): at 05:54

## 2024-03-22 RX ADMIN — LEVETIRACETAM 250 MILLIGRAM(S): 250 TABLET, FILM COATED ORAL at 05:52

## 2024-03-22 RX ADMIN — Medication 1 DROP(S): at 17:30

## 2024-03-22 RX ADMIN — Medication 1 DROP(S): at 11:26

## 2024-03-22 NOTE — PROGRESS NOTE ADULT - SUBJECTIVE AND OBJECTIVE BOX
CHIEF COMPLAINT: Patient is a 89y old  Male who presents with a chief complaint of COVID19, Chest pain (21 Mar 2024 13:35)      INTERVAL EVENTS:     ROS: Seen by bedside during AM rounds     OBJECTIVE:  ICU Vital Signs Last 24 Hrs  T(C): 36.6 (22 Mar 2024 04:00), Max: 36.9 (21 Mar 2024 12:00)  T(F): 97.8 (22 Mar 2024 04:00), Max: 98.4 (21 Mar 2024 12:00)  HR: 79 (22 Mar 2024 05:50) (68 - 83)  BP: 127/76 (22 Mar 2024 05:50) (106/44 - 146/58)  BP(mean): --  ABP: --  ABP(mean): --  RR: 18 (22 Mar 2024 04:00) (18 - 20)  SpO2: 97% (22 Mar 2024 04:00) (95% - 99%)    O2 Parameters below as of 22 Mar 2024 04:00  Patient On (Oxygen Delivery Method): ventilator    O2 Concentration (%): 30      Mode: AC/ CMV (Assist Control/ Continuous Mandatory Ventilation), RR (machine): 16, TV (machine): 400, FiO2: 30, PEEP: 5, MAP: 12, PIP: 38    03-21 @ 07:01  -  03-22 @ 07:00  --------------------------------------------------------  IN: 1400 mL / OUT: 2101 mL / NET: -701 mL      CAPILLARY BLOOD GLUCOSE      POCT Blood Glucose.: 114 mg/dL (22 Mar 2024 05:52)      PHYSICAL EXAM:  General:   HEENT:   Lymph Nodes:  Neck:   Respiratory:   Cardiovascular:   Abdomen:   Extremities:   Skin:   Neurological:  Psychiatry:    Mode: AC/ CMV (Assist Control/ Continuous Mandatory Ventilation)  RR (machine): 16  TV (machine): 400  FiO2: 30  PEEP: 5  MAP: 12  PIP: 38      HOSPITAL MEDICATIONS:  MEDICATIONS  (STANDING):  albuterol/ipratropium for Nebulization 3 milliLiter(s) Nebulizer every 12 hours  artificial  tears Solution 1 Drop(s) Left EYE four times a day  aspirin  chewable 81 milliGRAM(s) Enteral Tube daily  carvedilol 6.25 milliGRAM(s) Oral every 12 hours  chlorhexidine 0.12% Liquid 15 milliLiter(s) Oral Mucosa every 12 hours  chlorhexidine 2% Cloths 1 Application(s) Topical daily  dextrose 5%. 1000 milliLiter(s) (50 mL/Hr) IV Continuous <Continuous>  dextrose 5%. 1000 milliLiter(s) (100 mL/Hr) IV Continuous <Continuous>  dextrose 50% Injectable 25 Gram(s) IV Push once  dextrose 50% Injectable 12.5 Gram(s) IV Push once  dextrose 50% Injectable 25 Gram(s) IV Push once  doxazosin 2 milliGRAM(s) Oral at bedtime  escitalopram Solution 10 milliGRAM(s) Oral daily  furosemide    Tablet 20 milliGRAM(s) Oral daily  glucagon  Injectable 1 milliGRAM(s) IntraMuscular once  heparin   Injectable 5000 Unit(s) SubCutaneous every 8 hours  hydrALAZINE 25 milliGRAM(s) Oral every 8 hours  insulin glargine Injectable (LANTUS) 14 Unit(s) SubCutaneous <User Schedule>  insulin lispro (ADMELOG) corrective regimen sliding scale   SubCutaneous every 6 hours  levETIRAcetam  Solution 250 milliGRAM(s) Oral two times a day  melatonin 6 milliGRAM(s) Oral at bedtime  pantoprazole   Suspension 40 milliGRAM(s) Oral every 12 hours  petrolatum Ophthalmic Ointment 1 Application(s) Left EYE at bedtime  polyethylene glycol 3350 17 Gram(s) Oral daily  QUEtiapine 75 milliGRAM(s) Oral at bedtime  QUEtiapine 25 milliGRAM(s) Oral <User Schedule>  senna Syrup 10 milliLiter(s) Oral at bedtime  sucralfate suspension 1 Gram(s) Enteral Tube two times a day  ticagrelor 60 milliGRAM(s) Oral every 12 hours  valproic  acid Syrup 125 milliGRAM(s) Oral every 8 hours    MEDICATIONS  (PRN):  acetaminophen   Oral Liquid .. 650 milliGRAM(s) Oral every 6 hours PRN Temp greater or equal to 38C (100.4F), Mild Pain (1 - 3), Moderate Pain (4 - 6)  dextrose Oral Gel 15 Gram(s) Oral once PRN Blood Glucose LESS THAN 70 milliGRAM(s)/deciliter  QUEtiapine 12.5 milliGRAM(s) Oral every 6 hours PRN agitation      LABS:                        8.4    7.46  )-----------( 313      ( 22 Mar 2024 05:58 )             25.9     03-22    141  |  101  |  42<H>  ----------------------------<  113<H>  4.4   |  31  |  1.26    Ca    8.8      22 Mar 2024 05:58  Phos  3.6     03-22  Mg     2.30     03-22    TPro  7.0  /  Alb  2.9<L>  /  TBili  0.2  /  DBili  x   /  AST  19  /  ALT  11  /  AlkPhos  115  03-22      Urinalysis Basic - ( 22 Mar 2024 05:58 )    Color: x / Appearance: x / SG: x / pH: x  Gluc: 113 mg/dL / Ketone: x  / Bili: x / Urobili: x   Blood: x / Protein: x / Nitrite: x   Leuk Esterase: x / RBC: x / WBC x   Sq Epi: x / Non Sq Epi: x / Bacteria: x         CHIEF COMPLAINT: Patient is a 89y old  Male who presents with a chief complaint of COVID19, Chest pain (21 Mar 2024 13:35)      INTERVAL EVENTS:   - No overnight events. Did not require any PRNs for agitation.     ROS: Seen by bedside during AM rounds. Unable to obtain ROS 2/2 mental status.    OBJECTIVE:  ICU Vital Signs Last 24 Hrs  T(C): 36.6 (22 Mar 2024 04:00), Max: 36.9 (21 Mar 2024 12:00)  T(F): 97.8 (22 Mar 2024 04:00), Max: 98.4 (21 Mar 2024 12:00)  HR: 79 (22 Mar 2024 05:50) (68 - 83)  BP: 127/76 (22 Mar 2024 05:50) (106/44 - 146/58)  BP(mean): --  ABP: --  ABP(mean): --  RR: 18 (22 Mar 2024 04:00) (18 - 20)  SpO2: 97% (22 Mar 2024 04:00) (95% - 99%)    O2 Parameters below as of 22 Mar 2024 04:00  Patient On (Oxygen Delivery Method): ventilator    O2 Concentration (%): 30      Mode: AC/ CMV (Assist Control/ Continuous Mandatory Ventilation), RR (machine): 16, TV (machine): 400, FiO2: 30, PEEP: 5, MAP: 12, PIP: 38    03-21 @ 07:01  -  03-22 @ 07:00  --------------------------------------------------------  IN: 1400 mL / OUT: 2101 mL / NET: -701 mL      CAPILLARY BLOOD GLUCOSE      POCT Blood Glucose.: 114 mg/dL (22 Mar 2024 05:52)      PHYSICAL EXAM:  General: NAD, sleeping comfortably  Neck: trach to vent, c/d/i  Respiratory: lungs clear to ausculation b/l, breathing unlabored, no rales/rhonchi/wheeze  Cardiovascular: normal s1 s2, reg rate and rhythm   Abdomen: soft, non distended, non tender, +PEG  Extremities: no LE edema  Skin: warm and dry  Neurological: AAO x 0, responds to verbal stimuli, does not follows commands  Psychiatry: No agitation     Mode: AC/ CMV (Assist Control/ Continuous Mandatory Ventilation)  RR (machine): 16  TV (machine): 400  FiO2: 30  PEEP: 5  MAP: 12  PIP: 38      HOSPITAL MEDICATIONS:  MEDICATIONS  (STANDING):  albuterol/ipratropium for Nebulization 3 milliLiter(s) Nebulizer every 12 hours  artificial  tears Solution 1 Drop(s) Left EYE four times a day  aspirin  chewable 81 milliGRAM(s) Enteral Tube daily  carvedilol 6.25 milliGRAM(s) Oral every 12 hours  chlorhexidine 0.12% Liquid 15 milliLiter(s) Oral Mucosa every 12 hours  chlorhexidine 2% Cloths 1 Application(s) Topical daily  dextrose 5%. 1000 milliLiter(s) (50 mL/Hr) IV Continuous <Continuous>  dextrose 5%. 1000 milliLiter(s) (100 mL/Hr) IV Continuous <Continuous>  dextrose 50% Injectable 25 Gram(s) IV Push once  dextrose 50% Injectable 12.5 Gram(s) IV Push once  dextrose 50% Injectable 25 Gram(s) IV Push once  doxazosin 2 milliGRAM(s) Oral at bedtime  escitalopram Solution 10 milliGRAM(s) Oral daily  furosemide    Tablet 20 milliGRAM(s) Oral daily  glucagon  Injectable 1 milliGRAM(s) IntraMuscular once  heparin   Injectable 5000 Unit(s) SubCutaneous every 8 hours  hydrALAZINE 25 milliGRAM(s) Oral every 8 hours  insulin glargine Injectable (LANTUS) 14 Unit(s) SubCutaneous <User Schedule>  insulin lispro (ADMELOG) corrective regimen sliding scale   SubCutaneous every 6 hours  levETIRAcetam  Solution 250 milliGRAM(s) Oral two times a day  melatonin 6 milliGRAM(s) Oral at bedtime  pantoprazole   Suspension 40 milliGRAM(s) Oral every 12 hours  petrolatum Ophthalmic Ointment 1 Application(s) Left EYE at bedtime  polyethylene glycol 3350 17 Gram(s) Oral daily  QUEtiapine 75 milliGRAM(s) Oral at bedtime  QUEtiapine 25 milliGRAM(s) Oral <User Schedule>  senna Syrup 10 milliLiter(s) Oral at bedtime  sucralfate suspension 1 Gram(s) Enteral Tube two times a day  ticagrelor 60 milliGRAM(s) Oral every 12 hours  valproic  acid Syrup 125 milliGRAM(s) Oral every 8 hours    MEDICATIONS  (PRN):  acetaminophen   Oral Liquid .. 650 milliGRAM(s) Oral every 6 hours PRN Temp greater or equal to 38C (100.4F), Mild Pain (1 - 3), Moderate Pain (4 - 6)  dextrose Oral Gel 15 Gram(s) Oral once PRN Blood Glucose LESS THAN 70 milliGRAM(s)/deciliter  QUEtiapine 12.5 milliGRAM(s) Oral every 6 hours PRN agitation      LABS:                        8.4    7.46  )-----------( 313      ( 22 Mar 2024 05:58 )             25.9     03-22    141  |  101  |  42<H>  ----------------------------<  113<H>  4.4   |  31  |  1.26    Ca    8.8      22 Mar 2024 05:58  Phos  3.6     03-22  Mg     2.30     03-22    TPro  7.0  /  Alb  2.9<L>  /  TBili  0.2  /  DBili  x   /  AST  19  /  ALT  11  /  AlkPhos  115  03-22      Urinalysis Basic - ( 22 Mar 2024 05:58 )    Color: x / Appearance: x / SG: x / pH: x  Gluc: 113 mg/dL / Ketone: x  / Bili: x / Urobili: x   Blood: x / Protein: x / Nitrite: x   Leuk Esterase: x / RBC: x / WBC x   Sq Epi: x / Non Sq Epi: x / Bacteria: x

## 2024-03-22 NOTE — BH CONSULTATION LIAISON PROGRESS NOTE - NSBHASSESSMENTFT_PSY_ALL_CORE
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 Course ultimately complicated by progressive WOB and O2 demand and patient intubated and transferred to MICU (1/22). Course further complicated by acute cholecystitis and s/p Perc Lynsey with IR on (1/26), HFrEF 38, pulmonary edema, superimposed PNA, failed extubation failed and prolonged vent time and s/p trach (2/13). Patient transferred to RCU (2/16) and s/p PEG (2/20). Course complicated by volume overload and diuresis and GDMT continued and HFrEF 45 with improvement.  No known psychiatric history. Psychiatry consulted for agitation. He has been receiving PO and IM ativan several times per day, concern for oversedation. Son and DIL are physicians and requested consult.     Due to multiple medical comorbities and prolonged QTc, would hold off on antipsychotics and other QT prolonging agents.   Ativan is not an ideal PRN for this patient given deliriogenic effects and paradoxical activation.  Discussed with family plan to start low dose depakote for impulsivity and agitation and uptitrated as tolerated.    3/12: received ativan x2 overnight for ongoing agitation. LFTs wnl. Will continue to increase depakote and monitor. Would recommend cross tapering with keppra as keppra may be contributing to agitated delirium. Discussed with family.   3/14: some improvement in agitation with depakote, repeat EKG with QTc <500, still proceed with caution with antipsychotics, though antipsychotics are preferable to benzos for agitation in delirium  3/15: still requiring PRNs, appears to respond better to ativan than to olanzapine   - Depakote decreased to 125mg TID in response to hyperammonemia  3/18: ammonia improved, remains agitated requiring PRNs. Now that EKG has improved, can start standing seroquel for agitation and titrate as tolerated, monitor QTc.   3/19: started seroquel 25/50, no PRNs required overnight. Appears to be helping with agitation and delirium. Not obtunded on exam today.   3/20: awake and agitated, not sleeping, delirious.  Can add PRN seroquel 12.5mg q6h PRN for agitation  ALSO INCREASE standing seroquel to 25mg qd, 75mg qhs    3/22: Appears calm. Has not required PRN. Will not make changes to plan below. Call with questions.      Plan:  - INCREASE quetiapine 25mg @ 8am and 75mg qhs for delirium and agitation, hold for QTc > 500  - monitor EKG  - continue using ativan 0.25mg q4h PRN for severe agitation per RCU  - continue with depakote 125mg TID for impulsivity and agitation  - defer taper of keppra to primary team  - delirium precautions   - monitor LFTs, platelets, ammonia, VPA level   - of note, his VPA level will need to be adjusted for low albumin- please indicate this on DC summary     89 YO M with very complicated medical history and prolonged hospital course, notable for PMHx of CAD s/p CABG and GEMMA (last GEMMA 5/2022 on ASA and Brilinta), 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 Course ultimately complicated by progressive WOB and O2 demand and patient intubated and transferred to MICU (1/22). Course further complicated by acute cholecystitis and s/p Perc Lynsey with IR on (1/26), HFrEF 38, pulmonary edema, superimposed PNA, failed extubation failed and prolonged vent time and s/p trach (2/13). Patient transferred to RCU (2/16) and s/p PEG (2/20). Course complicated by volume overload and diuresis and GDMT continued and HFrEF 45 with improvement.  No known psychiatric history. Psychiatry consulted for agitation. He has been receiving PO and IM ativan several times per day, concern for oversedation. Son and DIL are physicians and requested consult.     Due to multiple medical comorbities and prolonged QTc, would hold off on antipsychotics and other QT prolonging agents.   Ativan is not an ideal PRN for this patient given deliriogenic effects and paradoxical activation.  Discussed with family plan to start low dose depakote for impulsivity and agitation and uptitrated as tolerated.    3/12: received ativan x2 overnight for ongoing agitation. LFTs wnl. Will continue to increase depakote and monitor. Would recommend cross tapering with keppra as keppra may be contributing to agitated delirium. Discussed with family.   3/14: some improvement in agitation with depakote, repeat EKG with QTc <500, still proceed with caution with antipsychotics, though antipsychotics are preferable to benzos for agitation in delirium  3/15: still requiring PRNs, appears to respond better to ativan than to olanzapine   - Depakote decreased to 125mg TID in response to hyperammonemia  3/18: ammonia improved, remains agitated requiring PRNs. Now that EKG has improved, can start standing seroquel for agitation and titrate as tolerated, monitor QTc.   3/19: started seroquel 25/50, no PRNs required overnight. Appears to be helping with agitation and delirium. Not obtunded on exam today.   3/20: awake and agitated, not sleeping, delirious.  Can add PRN seroquel 12.5mg q6h PRN for agitation  ALSO INCREASE standing seroquel to 25mg qd, 75mg qhs    3/22: Appears calm. Has not required PRN. Will not make changes to plan below. Call with questions.      Plan:  - INCREASE quetiapine 25mg @ 8am and 75mg qhs for delirium and agitation, hold for QTc > 500  - monitor EKG  - continue using ativan 0.25mg q4h PRN for severe agitation per RCU  - continue with depakote 125mg TID for impulsivity and agitation  	[] please check hepatic panel (including LFTs, albumin), valproic acid level for 3/23; if VPA is elevated when adjusting for low albumin, please page psych CL for further recs   - defer taper of keppra to primary team  - delirium precautions   - monitor LFTs, platelets, ammonia, VPA level   - of note, his VPA level will need to be adjusted for low albumin- please indicate this on DC summary

## 2024-03-22 NOTE — PROGRESS NOTE ADULT - NS ATTEND AMEND GEN_ALL_CORE FT
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  Course further complicated by acute cholecystitis and s/p Perc Lynsey with IR on (1/26), HFrEF 38, pulmonary edema, superimposed PNA, hypoxic respiratory failure reqiuring intubation and prolonged vent time and s/p trach (2/13). Patient transferred to RCU (2/16) and s/p PEG (2/20).  Course further complicated by agitation from underlying vascular dementia/ hospital delirium.     Neuro: hx of CVA, encephalopathy. Currently awake, periods of intermittent agitation, on 1:1. On Depakote 125 q8h. Keppra being tapered as well - now on 250 q12h. Appreciate behavioral health input.  Seroquel resumed with good effect - titrating up as tolerated. Currently seroquel 25 mg am, 75 mg qhs Monitoring QTc  CVS:  CAD, Afib, HFrEF - TTE 45% 3/4, coreg, hydralazine, not on AC due to GIB, lasix 20 mg daily, on ASA and Brillinta   GI: on PPI and carafate  Resp: on VCAC - trials of PS daily. Tolerating 10/5   ID: off antibiotics, completed etrapenem on 3/6, recently treated w/ Klebsiella PNA.   rest of plan as above.

## 2024-03-22 NOTE — PROGRESS NOTE ADULT - SUBJECTIVE AND OBJECTIVE BOX
NEPHROLOGY     Patient seen and examined with staff at bedside, pt trach to shalini, no acute distress.     family at bedside       ROS ; unable to take due to mentation   MEDICATIONS  (STANDING):  albuterol/ipratropium for Nebulization 3 milliLiter(s) Nebulizer every 12 hours  artificial  tears Solution 1 Drop(s) Left EYE four times a day  aspirin  chewable 81 milliGRAM(s) Enteral Tube daily  carvedilol 6.25 milliGRAM(s) Oral every 12 hours  chlorhexidine 0.12% Liquid 15 milliLiter(s) Oral Mucosa every 12 hours  chlorhexidine 2% Cloths 1 Application(s) Topical daily  dextrose 5%. 1000 milliLiter(s) (100 mL/Hr) IV Continuous <Continuous>  dextrose 5%. 1000 milliLiter(s) (50 mL/Hr) IV Continuous <Continuous>  dextrose 50% Injectable 25 Gram(s) IV Push once  dextrose 50% Injectable 25 Gram(s) IV Push once  dextrose 50% Injectable 12.5 Gram(s) IV Push once  divalproex Sprinkle 125 milliGRAM(s) Oral two times a day  doxazosin 2 milliGRAM(s) Oral at bedtime  escitalopram Solution 10 milliGRAM(s) Oral daily  furosemide    Tablet 20 milliGRAM(s) Oral daily  glucagon  Injectable 1 milliGRAM(s) IntraMuscular once  heparin   Injectable 5000 Unit(s) SubCutaneous every 8 hours  hydrALAZINE 50 milliGRAM(s) Oral every 8 hours  insulin glargine Injectable (LANTUS) 14 Unit(s) SubCutaneous <User Schedule>  insulin lispro (ADMELOG) corrective regimen sliding scale   SubCutaneous every 6 hours  levETIRAcetam  Solution 500 milliGRAM(s) Oral two times a day  melatonin 6 milliGRAM(s) Oral at bedtime  pantoprazole   Suspension 40 milliGRAM(s) Oral every 12 hours  petrolatum Ophthalmic Ointment 1 Application(s) Left EYE at bedtime  polyethylene glycol 3350 17 Gram(s) Oral daily  senna Syrup 10 milliLiter(s) Oral at bedtime  sucralfate suspension 1 Gram(s) Enteral Tube two times a day  ticagrelor 60 milliGRAM(s) Oral every 12 hours    VITALS:  T(C): , Max: 37 (03-11-24 @ 11:51)  T(F): , Max: 98.6 (03-11-24 @ 11:51)  HR: 95 (03-12-24 @ 11:30)  BP: 113/52 (03-12-24 @ 09:00)  BP(mean): 57 (03-12-24 @ 09:00)  RR: 18 (03-12-24 @ 09:00)  SpO2: 97% (03-12-24 @ 11:30)  Wt(kg): --  I and O's:    03-11 @ 07:01  -  03-12 @ 07:00  --------------------------------------------------------  IN: 1450 mL / OUT: 20 mL / NET: 1430 mL    PHYSICAL EXAM:  Constitutional: trach to vent   HEENT: +trach  Respiratory: coarse bs   Cardiovascular: normal s1s2  Gastrointestinal: BS+, soft, NT/ND +PEG  Extremities:+ peripheral edema  : + external catheter   Skin: No rashes    LABS:                        8.0    8.32  )-----------( 340      ( 12 Mar 2024 05:30 )             25.5     03-12    137  |  97<L>  |  50<H>  ----------------------------<  190<H>  4.7   |  30  |  1.50<H>    Ca    9.0      12 Mar 2024 05:30  Phos  3.8     03-12  Mg     2.50     03-12    TPro  7.2  /  Alb  3.0<L>  /  TBili  0.3  /  DBili  <0.2  /  AST  22  /  ALT  19  /  AlkPhos  176<H>  03-12      Urine Studies:  Urinalysis Basic - ( 12 Mar 2024 05:30 )    Color: x / Appearance: x / SG: x / pH: x  Gluc: 190 mg/dL / Ketone: x  / Bili: x / Urobili: x   Blood: x / Protein: x / Nitrite: x   Leuk Esterase: x / RBC: x / WBC x   Sq Epi: x / Non Sq Epi: x / Bacteria: x    RADIOLOGY & ADDITIONAL STUDIES:    rad< from: US Duplex Venous Lower Ext Complete, Bilateral (03.11.24 @ 19:29) >  IMPRESSION:  No evidence of deep venous thrombosis in either lower extremity.    Increased venous pulsatility suggestive of right-sided cardiac   dysfunction.        --- End of Report ---      ADEBAYO PALACIO MD; Attending Radiologist  This document has been electronically signed. Mar 11 2024  7:37PM    < end of copied text >   NEPHROLOGY     Patient seen and examined with staff at bedside, pt lin loya, no acute distress.   calm ,1:1  family at bedside       ROS ; unable to take due to mentation   MEDICATIONS  (STANDING):  albuterol/ipratropium for Nebulization 3 milliLiter(s) Nebulizer every 12 hours  artificial  tears Solution 1 Drop(s) Left EYE four times a day  aspirin  chewable 81 milliGRAM(s) Enteral Tube daily  carvedilol 6.25 milliGRAM(s) Oral every 12 hours  chlorhexidine 0.12% Liquid 15 milliLiter(s) Oral Mucosa every 12 hours  chlorhexidine 2% Cloths 1 Application(s) Topical daily  dextrose 5%. 1000 milliLiter(s) (100 mL/Hr) IV Continuous <Continuous>  dextrose 5%. 1000 milliLiter(s) (50 mL/Hr) IV Continuous <Continuous>  dextrose 50% Injectable 25 Gram(s) IV Push once  dextrose 50% Injectable 25 Gram(s) IV Push once  dextrose 50% Injectable 12.5 Gram(s) IV Push once  divalproex Sprinkle 125 milliGRAM(s) Oral two times a day  doxazosin 2 milliGRAM(s) Oral at bedtime  escitalopram Solution 10 milliGRAM(s) Oral daily  furosemide    Tablet 20 milliGRAM(s) Oral daily  glucagon  Injectable 1 milliGRAM(s) IntraMuscular once  heparin   Injectable 5000 Unit(s) SubCutaneous every 8 hours  hydrALAZINE 50 milliGRAM(s) Oral every 8 hours  insulin glargine Injectable (LANTUS) 14 Unit(s) SubCutaneous <User Schedule>  insulin lispro (ADMELOG) corrective regimen sliding scale   SubCutaneous every 6 hours  levETIRAcetam  Solution 500 milliGRAM(s) Oral two times a day  melatonin 6 milliGRAM(s) Oral at bedtime  pantoprazole   Suspension 40 milliGRAM(s) Oral every 12 hours  petrolatum Ophthalmic Ointment 1 Application(s) Left EYE at bedtime  polyethylene glycol 3350 17 Gram(s) Oral daily  senna Syrup 10 milliLiter(s) Oral at bedtime  sucralfate suspension 1 Gram(s) Enteral Tube two times a day  ticagrelor 60 milliGRAM(s) Oral every 12 hours    VITALS:  T(C): , Max: 37 (03-11-24 @ 11:51)  T(F): , Max: 98.6 (03-11-24 @ 11:51)  HR: 95 (03-12-24 @ 11:30)  BP: 113/52 (03-12-24 @ 09:00)  BP(mean): 57 (03-12-24 @ 09:00)  RR: 18 (03-12-24 @ 09:00)  SpO2: 97% (03-12-24 @ 11:30)  Wt(kg): --  I and O's:    03-11 @ 07:01  -  03-12 @ 07:00  --------------------------------------------------------  IN: 1450 mL / OUT: 20 mL / NET: 1430 mL    PHYSICAL EXAM:  Constitutional: trach to vent   HEENT: +trach  Respiratory: coarse bs   Cardiovascular: normal s1s2  Gastrointestinal: BS+, soft, NT/ND +PEG  Extremities:+ peripheral edema  : + external catheter   Skin: No rashes    LABS:                        8.0    8.32  )-----------( 340      ( 12 Mar 2024 05:30 )             25.5     03-12    137  |  97<L>  |  50<H>  ----------------------------<  190<H>  4.7   |  30  |  1.50<H>    Ca    9.0      12 Mar 2024 05:30  Phos  3.8     03-12  Mg     2.50     03-12    TPro  7.2  /  Alb  3.0<L>  /  TBili  0.3  /  DBili  <0.2  /  AST  22  /  ALT  19  /  AlkPhos  176<H>  03-12      Urine Studies:  Urinalysis Basic - ( 12 Mar 2024 05:30 )    Color: x / Appearance: x / SG: x / pH: x  Gluc: 190 mg/dL / Ketone: x  / Bili: x / Urobili: x   Blood: x / Protein: x / Nitrite: x   Leuk Esterase: x / RBC: x / WBC x   Sq Epi: x / Non Sq Epi: x / Bacteria: x    RADIOLOGY & ADDITIONAL STUDIES:    rad< from: US Duplex Venous Lower Ext Complete, Bilateral (03.11.24 @ 19:29) >  IMPRESSION:  No evidence of deep venous thrombosis in either lower extremity.    Increased venous pulsatility suggestive of right-sided cardiac   dysfunction.        --- End of Report ---      ADEBAYO PALACIO MD; Attending Radiologist  This document has been electronically signed. Mar 11 2024  7:37PM    < end of copied text >

## 2024-03-22 NOTE — PROGRESS NOTE ADULT - ASSESSMENT
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 Course ultimately complicated by progressive WOB and O2 demand and patient intubated and transferred to MICU (1/22). Course further complicated by acute cholecystitis and s/p Perc Lynsey with IR on (1/26), HFrEF 38, pulmonary edema, superimposed PNA, failed extubation failed and prolonged vent time and s/p trach (2/13). Patient transferred to RCU (2/16) and s/p PEG (2/20). Course complicated by volume overload and diuresis and GDMT continued and HFrEF 45 with improvement. Course further complicated by agitation from underlying vascular dementia/ hospital delirium.     NEUROLOGY   # Encephalopathy   - Hx of CVA with no residual deficits per family, however per family worsening confusion at home over the past 6 months (becoming disoriented to time) but prior to admission has been more confused, talking about seeing people that are not present.  - CTH (1/31) with no evidence of acute infarct  - Continue on ASA and Brilinta  - Holding Neurontin, Memantine and Oxycodone in setting of somnolence  - 3/17: obtunded overnight, CT Head: Mild chronic microvascular changes without evidence of an acute transcortical infarction or hemorrhage.  - VBG and Ammonia level grossly unremarkable    # ICA stenosis   - CTA NECK (1/31) with moderate to severe narrowing left internal carotid at the origin. Severe narrowing right internal carotid by a tandem lesion 1.5 cm above the bifurcation with a narrowed internal carotid beyond the lesion. Extensive calcified plaque both cavernous and supraclinoid carotid arteries with narrowing but no occlusion. Mild narrowing proximal right M1 segment. Moderate narrowing both vertebral V4 segments. No perfusion abnormality at the Tmax 6 second threshold, but moderate hypoperfusion in the right MCA territory at the 4 second threshold.   - Continue on ASA and Brilinta    # Myoclonic jerks   - Hx of myoclonic jerks post CVAs   - EEG (1/18-2/6) negative for seizures  - Continue on Keppra and per neuro/ psych wishes to wean, family in agreement   - Currently down titrating Keppra 500 mg BID, now Keppra 250 mg BID 3/13 - )    # Depression/ Insomnia  - Continue on Lexapro per home medication   - Course complicated by agitation and pulling on tubes   - Home Seroquel discontinued as patient now on Valproic acid syrup   - S/p Ativan 0.5mg PO Q6H PRN and Haldol 0.25 mg Q6H for severe agitation, then s/p IM Zypexa 1.25 Q8H PRN for agitation    - Supportive care     # Agitation in setting of delirium / underlying vascular dementia   - C/w  mg TID, BH following   - monitor platelets, LFTs while on Depakote   - Recently, Pt had responded much better to Ativan than Zyprexa for agitation, however became obtunded, so Ativan d/c Ativan 0.5mg Q6H PRN  - 3/18 Start Seroquel 25 mg in AM and Seroquel 50 mg QHS   - 3/20 restless /agitated overnight Ativan x 2 no effect - DW psych can do Seroquel 12.5 PRN/ increase night Seroquel to 75mg - continues on 1:1 restless with  family    CARDIOLOGY   # Vasoplegic vs septic shock  # Hx of HTN   - Weaned off pressors in ICU and now with mild hypertension   - Continue on coreg and hydral as below   - Strict BP control given moderate AS  - Increased vascular congestion on CXR (3/9) so will give additional Lasix 20mg PO x1 (3/10)  - Diastolic bp low <50 hold Hydralazine and monitor bp - may need to decrease of dc     # AFIB RVR   # Bifascicular Block with Leroy [employer] PPM   - AFIB RVR episodes and PPM interrogated (2/20) by EP with similar episodes  - Previous admission in 6/2022 with cardiogenic syncope second to bifasicular block, however now with PPM and AV myron blockers ok.   - Continue on lopressor 12.5mg BID however replaced with coreg second to HFrEF   - AC discussed at length however with recent GIB will hold for now     # HFrEF 38 likely stress induced?   # Mild to moderate AS   - ECHO (12/2023) with EF 62 with normal LVSF and mild to moderate AS   - ECHO (2/2024) with EF 38, moderate LVSD with global LV hypokinesis, mildly reduced RVSF (TAPSE 1.3), mild to moderate MR, mild AR, and moderate AS.   - RPT ECHO (3/4) with EF 45 and mild to moderate LVSD   - Continue on Lasix 20 QD, coreg 6.25 and hydralazine 50 (increased 3/4)   - Hold isordil and up titrate above medications first     # CAD with CABG   - CATH (5/2022) with dLAD 70, SVG graft to OM1 with 90 in stent stenosis s/p PCI and GEMMA placement, and LIMA graft to mLAD with no disease.   - Continue on ASA and Brilinta    # Prolonged QTC (Resolved)  - ECG (3/2) with QTC 616ms (corrected using bazzet 490ms)   - ECG (3/3) with QTC 634ms (corrected using bazzet 490ms)   - ECG (3/11) with QTC 490ms, EKG (3/15) QTc 498ms, EKG (3/21) 486ms  - Monitor QTc/ daily EKGs       RESPIRATORY   # Acute respiratory failure second to SARS-COV-2 vs pulm edema vs PNA  - Presented with COVID complicated by sepsis second to acute lynsey and worsening respiratory failure second to pulmonary edema requiring intubation.   - Prolonged intubation and s/p tracheostomy (2/13)   - CT CHEST 1/16 with new small bilateral pleural effusions/ atelectasis/ patchy bilateral ground-glass opacities are consistent with pulmonary edema.  - Completed ABX and COVID regimen as below   - Continue on vent and allow SBT as tolerated   - Continue on nebs and hold further HTS given intermittent hemoptysis  - Continue on diuresis as above    # Mild hemoptysis likely from suction trauma   - s/p bronch (2/18) with no active bleed  - Hemoptysis improved with TXA and holding further HTS as above   - Tiny hemoptysis second to suction trauma imporving  - Careful with suctioning   - Recurrent hemoptysis (3/9) so ordered for TXA nebs again however hemoptysis appeared self limited and stopped before nebs even given    HEENT   # L eye subconjunctival hemorrhage   - Continue on artifical tears and Lacrilube   - Optho eval appreciated     GI  # Nutrition/ Dysphagia   - PEG placed by GI on 2/20 and tube feeds continued.   - Loose stools and Banatrol continued     # UGIB   - EGD (1/2) with esophagitis and bleeding Dieulafoy's lesion s/p clips.   - Continue on PPI and carafate     # SMA calcification   - CTA demonstrating mesenteric fat stranding associated with ascending/transverse colon  - Diagnostic laparoscopy (12/24) with small bowel and visible colon viable, some inflammation of omentum in RUQ  - SMA Angiogram and stent placed (12/25).   - Continue on ASA and Brilinta     # Acute Cholecystitis   - CT (12/26) with distended GB with cholelithiasis and sludge   - HIDA (12/29) POSITIVE for acute cholecystitis   - Case discussed with IR at length and s/p PERC LYNSEY (1/26)   - Completed zosyn course (12/24-12/31)   - PERC LYNSEY tube to stay in until surgical candidate for cholecystectomy to prevent recurrence     / RENAL   # CKD   - CRE at baseline   - Monitor renal function and UOP   - Aranesp SQ x1 (3/8)    # Hypernatremia (resolved)   - s/p  Q4H  - Monitor closely with diuresis as above   - Stop FWF (3/15) given c/f worsening vol overload     INFECTIOUS DISEASE   # COVID with superimposed PNA   # ESBL Kleb PNA   - COVID POSITIVE (12/23) and completed remdesivir x 1 dose and paxlovid course.   - WOB worsened as above requiring intubation and cultures negative. Zosyn completed empirically however hypothermic (2/11) and BCx, UA, RVP and BAL negative.   - Completed additional zosyn (2/12-2/19) empirically   - Fever spike and thick secretions and SCX (2/26) with ESBL klebs.   - s/p Zosyn (2/27 - 2/29) but resistant and completed Erta (2/29 - 3/6)     HEME  # AOCD   - Monitor HH   - DVT PPX with HSQ     ENDOCRINE   # DM2 A1C 9.3   - Continue on Lantus 14 and ISS     SKIN/ TUBES   - Trach (2/13)   - PEG (2/20)   - PERC LYNSEY (1/26 - )     ETHICS   - FULL CODE     DISPO - vent facility and family aware. SW with accepting facility however pending available bed.  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 Course ultimately complicated by progressive WOB and O2 demand and patient intubated and transferred to MICU (1/22). Course further complicated by acute cholecystitis and s/p Perc Lynsey with IR on (1/26), HFrEF 38, pulmonary edema, superimposed PNA, failed extubation failed and prolonged vent time and s/p trach (2/13). Patient transferred to RCU (2/16) and s/p PEG (2/20). Course complicated by volume overload and diuresis and GDMT continued and HFrEF 45 with improvement. Course further complicated by agitation from underlying vascular dementia/ hospital delirium.     NEUROLOGY   # Encephalopathy   - Hx of CVA with no residual deficits per family, however per family worsening confusion at home over the past 6 months (becoming disoriented to time) but prior to admission has been more confused, talking about seeing people that are not present.  - CTH (1/31) with no evidence of acute infarct  - Continue on ASA and Brilinta  - Holding Neurontin, Memantine and Oxycodone in setting of somnolence  - 3/17: obtunded overnight, CT Head: Mild chronic microvascular changes without evidence of an acute transcortical infarction or hemorrhage.  - VBG and Ammonia level grossly unremarkable    # ICA stenosis   - CTA NECK (1/31) with moderate to severe narrowing left internal carotid at the origin. Severe narrowing right internal carotid by a tandem lesion 1.5 cm above the bifurcation with a narrowed internal carotid beyond the lesion. Extensive calcified plaque both cavernous and supraclinoid carotid arteries with narrowing but no occlusion. Mild narrowing proximal right M1 segment. Moderate narrowing both vertebral V4 segments. No perfusion abnormality at the Tmax 6 second threshold, but moderate hypoperfusion in the right MCA territory at the 4 second threshold.   - Continue on ASA and Brilinta    # Myoclonic jerks   - Hx of myoclonic jerks post CVAs   - EEG (1/18-2/6) negative for seizures  - Continue on Keppra and per neuro/ psych wishes to wean, family in agreement   - Currently down titrating Keppra 500 mg BID, now Keppra 250 mg BID 3/13 - )    # Depression/ Insomnia  - Continue on Lexapro per home medication   - Course complicated by agitation and pulling on tubes   - Home Seroquel discontinued as patient now on Valproic acid syrup   - S/p Ativan 0.5mg PO Q6H PRN and Haldol 0.25 mg Q6H for severe agitation, then s/p IM Zypexa 1.25 Q8H PRN for agitation    - Supportive care     # Agitation in setting of delirium / underlying vascular dementia   - C/w  mg TID, BH following   - monitor platelets, LFTs while on Depakote   - Recently, Pt had responded much better to Ativan than Zyprexa for agitation, however became obtunded, so Ativan d/c Ativan 0.5mg Q6H PRN  - 3/18 Start Seroquel 25 mg in AM and Seroquel 50 mg QHS   - 3/20 restless /agitated overnight Ativan x 2 no effect - DW psych can do Seroquel 12.5 PRN/ increase night Seroquel to 75mg - continues on 1:1 restless with  family  - 3/21-3/22 Calm, remains delirious no recent episodes of agitation     CARDIOLOGY   # Vasoplegic vs septic shock  # Hx of HTN   - Weaned off pressors in ICU and now with mild hypertension   - Continue on coreg and hydral as below   - Strict BP control given moderate AS  - Increased vascular congestion on CXR (3/9) so will give additional Lasix 20mg PO x1 (3/10)  - Diastolic bp low <50 hold Hydralazine and monitor bp - may need to decrease of dc     # AFIB RVR   # Bifascicular Block with Medtronic PPM   - AFIB RVR episodes and PPM interrogated (2/20) by EP with similar episodes  - Previous admission in 6/2022 with cardiogenic syncope second to bifasicular block, however now with PPM and AV myron blockers ok.   - Continue on lopressor 12.5mg BID however replaced with coreg second to HFrEF   - AC discussed at length however with recent GIB will hold for now     # HFrEF 38 likely stress induced?   # Mild to moderate AS   - ECHO (12/2023) with EF 62 with normal LVSF and mild to moderate AS   - ECHO (2/2024) with EF 38, moderate LVSD with global LV hypokinesis, mildly reduced RVSF (TAPSE 1.3), mild to moderate MR, mild AR, and moderate AS.   - RPT ECHO (3/4) with EF 45 and mild to moderate LVSD   - Continue on Lasix 20 QD, coreg 6.25 and hydralazine 50 (increased 3/4)   - Hold isordil and up titrate above medications first     # CAD with CABG   - CATH (5/2022) with dLAD 70, SVG graft to OM1 with 90 in stent stenosis s/p PCI and GEMMA placement, and LIMA graft to mLAD with no disease.   - Continue on ASA and Brilinta    # Prolonged QTC (Resolved)  - ECG (3/2) with QTC 616ms (corrected using bazzet 490ms)   - ECG (3/3) with QTC 634ms (corrected using bazzet 490ms)   - ECG (3/11) with QTC 490ms, EKG (3/15) QTc 498ms, EKG (3/21) 486ms  - Monitor QTc/ daily EKGs       RESPIRATORY   # Acute respiratory failure second to SARS-COV-2 vs pulm edema vs PNA  - Presented with COVID complicated by sepsis second to acute lynsey and worsening respiratory failure second to pulmonary edema requiring intubation.   - Prolonged intubation and s/p tracheostomy (2/13)   - CT CHEST 1/16 with new small bilateral pleural effusions/ atelectasis/ patchy bilateral ground-glass opacities are consistent with pulmonary edema.  - Completed ABX and COVID regimen as below   - Continue on vent and allow SBT as tolerated   - Continue on nebs and hold further HTS given intermittent hemoptysis  - Continue on diuresis as above    # Mild hemoptysis likely from suction trauma   - s/p bronch (2/18) with no active bleed  - Hemoptysis improved with TXA and holding further HTS as above   - Tiny hemoptysis second to suction trauma imporving  - Careful with suctioning   - Recurrent hemoptysis (3/9) so ordered for TXA nebs again however hemoptysis appeared self limited and stopped before nebs even given    HEENT   # L eye subconjunctival hemorrhage   - Continue on artifical tears and Lacrilube   - Optho eval appreciated     GI  # Nutrition/ Dysphagia   - PEG placed by GI on 2/20 and tube feeds continued.   - Loose stools and Banatrol continued     # UGIB   - EGD (1/2) with esophagitis and bleeding Dieulafoy's lesion s/p clips.   - Continue on PPI and carafate     # SMA calcification   - CTA demonstrating mesenteric fat stranding associated with ascending/transverse colon  - Diagnostic laparoscopy (12/24) with small bowel and visible colon viable, some inflammation of omentum in RUQ  - SMA Angiogram and stent placed (12/25).   - Continue on ASA and Brilinta     # Acute Cholecystitis   - CT (12/26) with distended GB with cholelithiasis and sludge   - HIDA (12/29) POSITIVE for acute cholecystitis   - Case discussed with IR at length and s/p PERC LYNSEY (1/26)   - Completed zosyn course (12/24-12/31)   - PERC LYNSEY tube to stay in until surgical candidate for cholecystectomy to prevent recurrence     / RENAL   # CKD   - CRE at baseline   - Monitor renal function and UOP   - Aranesp SQ x1 (3/8)    # Hypernatremia (resolved)   - s/p  Q4H  - Monitor closely with diuresis as above   - Stop FWF (3/15) given c/f worsening vol overload     INFECTIOUS DISEASE   # COVID with superimposed PNA   # ESBL Kleb PNA   - COVID POSITIVE (12/23) and completed remdesivir x 1 dose and paxlovid course.   - WOB worsened as above requiring intubation and cultures negative. Zosyn completed empirically however hypothermic (2/11) and BCx, UA, RVP and BAL negative.   - Completed additional zosyn (2/12-2/19) empirically   - Fever spike and thick secretions and SCX (2/26) with ESBL klebs.   - s/p Zosyn (2/27 - 2/29) but resistant and completed Erta (2/29 - 3/6)     HEME  # AOCD   - Monitor HH   - DVT PPX with HSQ     ENDOCRINE   # DM2 A1C 9.3   - Continue on Lantus 14 and ISS     SKIN/ TUBES   - Trach (2/13)   - PEG (2/20)   - PERC LYNSEY (1/26 - )     ETHICS   - FULL CODE     DISPO - vent facility and family aware. SW with accepting facility however pending available bed.

## 2024-03-22 NOTE — PROGRESS NOTE ADULT - ASSESSMENT
IMPRESSION:  89M w/ DM2, HTN, CVA, PAD, CKD3, and CAD-CABG, 12/23/23 p/w COVID19 infection, c/b respiratory failure/hypotension; now s/p tracheostomy    (1)Renal - CKD3; superimposed mild prerenal azotemia - numbers fluctuating based on hemodynamic status, slightly higher   (2)CV - now off pressors and midodrine, BP improved/stable    (3)Pulm - vent-dependent   (4)ID - afebrile, s/p zosyn   (5)GI - s/p PEG (2/20)  (6)Hypernatremia - possibly due to diuresis, Na+ improved    RECOMMEND:   (1)a/w lasix 20 mg iv  PRN   (2)flush PEG as needed   (3)Continue meds for GFR 40-50ml/min  (4)last  aranesp 60 mcg Sq x 1 on  3/18/24          [facility]  Office: [phone]

## 2024-03-23 LAB
ALBUMIN SERPL ELPH-MCNC: 3 G/DL — LOW (ref 3.3–5)
ALP SERPL-CCNC: 118 U/L — SIGNIFICANT CHANGE UP (ref 40–120)
ALT FLD-CCNC: 12 U/L — SIGNIFICANT CHANGE UP (ref 4–41)
ANION GAP SERPL CALC-SCNC: 11 MMOL/L — SIGNIFICANT CHANGE UP (ref 7–14)
AST SERPL-CCNC: 18 U/L — SIGNIFICANT CHANGE UP (ref 4–40)
BILIRUB DIRECT SERPL-MCNC: <0.2 MG/DL — SIGNIFICANT CHANGE UP (ref 0–0.3)
BILIRUB INDIRECT FLD-MCNC: >0 MG/DL — SIGNIFICANT CHANGE UP (ref 0–1)
BILIRUB SERPL-MCNC: 0.2 MG/DL — SIGNIFICANT CHANGE UP (ref 0.2–1.2)
BUN SERPL-MCNC: 42 MG/DL — HIGH (ref 7–23)
CALCIUM SERPL-MCNC: 8.7 MG/DL — SIGNIFICANT CHANGE UP (ref 8.4–10.5)
CHLORIDE SERPL-SCNC: 101 MMOL/L — SIGNIFICANT CHANGE UP (ref 98–107)
CO2 SERPL-SCNC: 29 MMOL/L — SIGNIFICANT CHANGE UP (ref 22–31)
CREAT SERPL-MCNC: 1.26 MG/DL — SIGNIFICANT CHANGE UP (ref 0.5–1.3)
EGFR: 54 ML/MIN/1.73M2 — LOW
GLUCOSE BLDC GLUCOMTR-MCNC: 140 MG/DL — HIGH (ref 70–99)
GLUCOSE BLDC GLUCOMTR-MCNC: 142 MG/DL — HIGH (ref 70–99)
GLUCOSE BLDC GLUCOMTR-MCNC: 163 MG/DL — HIGH (ref 70–99)
GLUCOSE SERPL-MCNC: 144 MG/DL — HIGH (ref 70–99)
HCT VFR BLD CALC: 27.1 % — LOW (ref 39–50)
HGB BLD-MCNC: 8.5 G/DL — LOW (ref 13–17)
MAGNESIUM SERPL-MCNC: 2.3 MG/DL — SIGNIFICANT CHANGE UP (ref 1.6–2.6)
MCHC RBC-ENTMCNC: 28.7 PG — SIGNIFICANT CHANGE UP (ref 27–34)
MCHC RBC-ENTMCNC: 31.4 GM/DL — LOW (ref 32–36)
MCV RBC AUTO: 91.6 FL — SIGNIFICANT CHANGE UP (ref 80–100)
NRBC # BLD: 0 /100 WBCS — SIGNIFICANT CHANGE UP (ref 0–0)
NRBC # FLD: 0 K/UL — SIGNIFICANT CHANGE UP (ref 0–0)
PHOSPHATE SERPL-MCNC: 3.5 MG/DL — SIGNIFICANT CHANGE UP (ref 2.5–4.5)
PLATELET # BLD AUTO: 322 K/UL — SIGNIFICANT CHANGE UP (ref 150–400)
POTASSIUM SERPL-MCNC: 4.2 MMOL/L — SIGNIFICANT CHANGE UP (ref 3.5–5.3)
POTASSIUM SERPL-SCNC: 4.2 MMOL/L — SIGNIFICANT CHANGE UP (ref 3.5–5.3)
PROT SERPL-MCNC: 7.1 G/DL — SIGNIFICANT CHANGE UP (ref 6–8.3)
RBC # BLD: 2.96 M/UL — LOW (ref 4.2–5.8)
RBC # FLD: 15.6 % — HIGH (ref 10.3–14.5)
SODIUM SERPL-SCNC: 141 MMOL/L — SIGNIFICANT CHANGE UP (ref 135–145)
VALPROATE SERPL-MCNC: 36.4 UG/ML — LOW (ref 50–100)
WBC # BLD: 6.89 K/UL — SIGNIFICANT CHANGE UP (ref 3.8–10.5)
WBC # FLD AUTO: 6.89 K/UL — SIGNIFICANT CHANGE UP (ref 3.8–10.5)

## 2024-03-23 PROCEDURE — 99233 SBSQ HOSP IP/OBS HIGH 50: CPT | Mod: FS

## 2024-03-23 PROCEDURE — 93010 ELECTROCARDIOGRAM REPORT: CPT

## 2024-03-23 RX ORDER — QUETIAPINE FUMARATE 200 MG/1
12.5 TABLET, FILM COATED ORAL ONCE
Refills: 0 | Status: COMPLETED | OUTPATIENT
Start: 2024-03-23 | End: 2024-03-23

## 2024-03-23 RX ADMIN — Medication 20 MILLIGRAM(S): at 05:44

## 2024-03-23 RX ADMIN — Medication 125 MILLIGRAM(S): at 21:44

## 2024-03-23 RX ADMIN — CHLORHEXIDINE GLUCONATE 1 APPLICATION(S): 213 SOLUTION TOPICAL at 11:14

## 2024-03-23 RX ADMIN — Medication 3 MILLILITER(S): at 19:25

## 2024-03-23 RX ADMIN — Medication 1 APPLICATION(S): at 21:46

## 2024-03-23 RX ADMIN — HEPARIN SODIUM 5000 UNIT(S): 5000 INJECTION INTRAVENOUS; SUBCUTANEOUS at 13:28

## 2024-03-23 RX ADMIN — Medication 6 MILLIGRAM(S): at 21:47

## 2024-03-23 RX ADMIN — Medication 1 DROP(S): at 05:45

## 2024-03-23 RX ADMIN — Medication 2 MILLIGRAM(S): at 21:45

## 2024-03-23 RX ADMIN — Medication 125 MILLIGRAM(S): at 13:29

## 2024-03-23 RX ADMIN — Medication 125 MILLIGRAM(S): at 05:48

## 2024-03-23 RX ADMIN — CHLORHEXIDINE GLUCONATE 15 MILLILITER(S): 213 SOLUTION TOPICAL at 17:22

## 2024-03-23 RX ADMIN — QUETIAPINE FUMARATE 12.5 MILLIGRAM(S): 200 TABLET, FILM COATED ORAL at 19:28

## 2024-03-23 RX ADMIN — Medication 3 MILLILITER(S): at 08:23

## 2024-03-23 RX ADMIN — CARVEDILOL PHOSPHATE 6.25 MILLIGRAM(S): 80 CAPSULE, EXTENDED RELEASE ORAL at 17:18

## 2024-03-23 RX ADMIN — QUETIAPINE FUMARATE 75 MILLIGRAM(S): 200 TABLET, FILM COATED ORAL at 21:45

## 2024-03-23 RX ADMIN — TICAGRELOR 60 MILLIGRAM(S): 90 TABLET ORAL at 17:18

## 2024-03-23 RX ADMIN — INSULIN GLARGINE 14 UNIT(S): 100 INJECTION, SOLUTION SUBCUTANEOUS at 11:14

## 2024-03-23 RX ADMIN — Medication 25 MILLIGRAM(S): at 21:48

## 2024-03-23 RX ADMIN — POLYETHYLENE GLYCOL 3350 17 GRAM(S): 17 POWDER, FOR SOLUTION ORAL at 11:11

## 2024-03-23 RX ADMIN — ESCITALOPRAM OXALATE 10 MILLIGRAM(S): 10 TABLET, FILM COATED ORAL at 11:11

## 2024-03-23 RX ADMIN — PANTOPRAZOLE SODIUM 40 MILLIGRAM(S): 20 TABLET, DELAYED RELEASE ORAL at 05:44

## 2024-03-23 RX ADMIN — Medication 2: at 05:45

## 2024-03-23 RX ADMIN — PANTOPRAZOLE SODIUM 40 MILLIGRAM(S): 20 TABLET, DELAYED RELEASE ORAL at 17:21

## 2024-03-23 RX ADMIN — HEPARIN SODIUM 5000 UNIT(S): 5000 INJECTION INTRAVENOUS; SUBCUTANEOUS at 05:45

## 2024-03-23 RX ADMIN — QUETIAPINE FUMARATE 25 MILLIGRAM(S): 200 TABLET, FILM COATED ORAL at 08:56

## 2024-03-23 RX ADMIN — HEPARIN SODIUM 5000 UNIT(S): 5000 INJECTION INTRAVENOUS; SUBCUTANEOUS at 21:42

## 2024-03-23 RX ADMIN — CARVEDILOL PHOSPHATE 6.25 MILLIGRAM(S): 80 CAPSULE, EXTENDED RELEASE ORAL at 05:44

## 2024-03-23 RX ADMIN — TICAGRELOR 60 MILLIGRAM(S): 90 TABLET ORAL at 05:44

## 2024-03-23 RX ADMIN — Medication 1 GRAM(S): at 05:44

## 2024-03-23 RX ADMIN — Medication 1 GRAM(S): at 17:19

## 2024-03-23 RX ADMIN — Medication 1 DROP(S): at 11:14

## 2024-03-23 RX ADMIN — LEVETIRACETAM 250 MILLIGRAM(S): 250 TABLET, FILM COATED ORAL at 05:43

## 2024-03-23 RX ADMIN — Medication 1 DROP(S): at 17:21

## 2024-03-23 RX ADMIN — Medication 0.25 MILLIGRAM(S): at 01:47

## 2024-03-23 RX ADMIN — CHLORHEXIDINE GLUCONATE 15 MILLILITER(S): 213 SOLUTION TOPICAL at 05:43

## 2024-03-23 RX ADMIN — QUETIAPINE FUMARATE 12.5 MILLIGRAM(S): 200 TABLET, FILM COATED ORAL at 00:31

## 2024-03-23 RX ADMIN — Medication 81 MILLIGRAM(S): at 11:15

## 2024-03-23 RX ADMIN — LEVETIRACETAM 250 MILLIGRAM(S): 250 TABLET, FILM COATED ORAL at 17:20

## 2024-03-23 NOTE — CHART NOTE - NSCHARTNOTEFT_GEN_A_CORE
IR follow up note:    89 M with PMH of acute asa s/p per asa 1/26. Was paged that drainage is more sanguinous compared to usual.     Evaluated patient at bedside. Tube flushed with 10 cc of saline. Site is c/d/i. Sutures intact. dressing changed.    Please obtain non con CT abd/pelvis to evaluate drain, as there has been a change in color with decrease in output.

## 2024-03-23 NOTE — PROGRESS NOTE ADULT - NS ATTEND AMEND GEN_ALL_CORE FT
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  Course further complicated by acute cholecystitis and s/p Perc Lynsey with IR on (1/26), HFrEF 38, pulmonary edema, superimposed PNA, hypoxic respiratory failure reqiuring intubation and prolonged vent time and s/p trach (2/13). Patient transferred to RCU (2/16) and s/p PEG (2/20).  Course further complicated by agitation from underlying vascular dementia/ hospital delirium.     Neuro: hx of CVA, encephalopathy. Currently awake, periods of intermittent agitation, on 1:1. On Depakote 125 q8h. Keppra being tapered as well - now on 250 q12h. Appreciate behavioral health input.  Seroquel resumed with good effect - titrating up as tolerated. Currently seroquel 25 mg am, 75 mg qhs Monitoring QTc. Overnight required multiple doses of seroquel prn and ativan. Discussed with behavioral health, continue current dose for now   CVS:  CAD, Afib, HFrEF - TTE 45% 3/4, coreg, hydralazine, not on AC due to GIB, lasix 20 mg daily, on ASA and Brillinta   GI: on PPI and carafate, cholecystostomy tube in place - slightly more reddish output today,Hb stable, appreciate IR assistance  Resp: on VCAC - trials of PS daily. Tolerating 10/5   ID: off antibiotics, completed etrapenem on 3/6, recently treated w/ Klebsiella PNA.   rest of plan as above.

## 2024-03-23 NOTE — PROGRESS NOTE ADULT - SUBJECTIVE AND OBJECTIVE BOX
CHIEF COMPLAINT:Patient is a 89y old  Male who presents with a chief complaint of COVID19, Chest pain (22 Mar 2024 14:23)      INTERVAL EVENTS:     ROS: Seen by bedside during AM rounds     OBJECTIVE:  ICU Vital Signs Last 24 Hrs  T(C): 36.6 (23 Mar 2024 00:00), Max: 36.7 (22 Mar 2024 08:00)  T(F): 97.9 (23 Mar 2024 00:00), Max: 98 (22 Mar 2024 08:00)  HR: 86 (23 Mar 2024 05:43) (72 - 91)  BP: 128/52 (23 Mar 2024 05:43) (104/40 - 146/61)  BP(mean): 70 (23 Mar 2024 05:43) (70 - 79)  ABP: --  ABP(mean): --  RR: 20 (23 Mar 2024 05:43) (16 - 20)  SpO2: 97% (23 Mar 2024 05:43) (96% - 100%)    O2 Parameters below as of 23 Mar 2024 05:43  Patient On (Oxygen Delivery Method): ventilator    O2 Concentration (%): 30      Mode: CPAP with PS, FiO2: 30, PEEP: 5, PS: 16, MAP: 12, PIP: 22    03-22 @ 07:01  -  03-23 @ 07:00  --------------------------------------------------------  IN: 1340 mL / OUT: 960 mL / NET: 380 mL      CAPILLARY BLOOD GLUCOSE      POCT Blood Glucose.: 163 mg/dL (23 Mar 2024 05:38)      PHYSICAL EXAM:  General:   HEENT:   Lymph Nodes:  Neck:   Respiratory:   Cardiovascular:   Abdomen:   Extremities:   Skin:   Neurological:  Psychiatry:    Mode: CPAP with PS  FiO2: 30  PEEP: 5  PS: 16  MAP: 12  PIP: 22      HOSPITAL MEDICATIONS:  MEDICATIONS  (STANDING):  albuterol/ipratropium for Nebulization 3 milliLiter(s) Nebulizer every 12 hours  artificial  tears Solution 1 Drop(s) Left EYE four times a day  aspirin  chewable 81 milliGRAM(s) Enteral Tube daily  carvedilol 6.25 milliGRAM(s) Oral every 12 hours  chlorhexidine 0.12% Liquid 15 milliLiter(s) Oral Mucosa every 12 hours  chlorhexidine 2% Cloths 1 Application(s) Topical daily  dextrose 5%. 1000 milliLiter(s) (100 mL/Hr) IV Continuous <Continuous>  dextrose 5%. 1000 milliLiter(s) (50 mL/Hr) IV Continuous <Continuous>  dextrose 50% Injectable 25 Gram(s) IV Push once  dextrose 50% Injectable 12.5 Gram(s) IV Push once  dextrose 50% Injectable 25 Gram(s) IV Push once  doxazosin 2 milliGRAM(s) Oral at bedtime  escitalopram Solution 10 milliGRAM(s) Oral daily  furosemide    Tablet 20 milliGRAM(s) Oral daily  glucagon  Injectable 1 milliGRAM(s) IntraMuscular once  heparin   Injectable 5000 Unit(s) SubCutaneous every 8 hours  hydrALAZINE 25 milliGRAM(s) Oral every 8 hours  insulin glargine Injectable (LANTUS) 14 Unit(s) SubCutaneous <User Schedule>  insulin lispro (ADMELOG) corrective regimen sliding scale   SubCutaneous every 6 hours  levETIRAcetam  Solution 250 milliGRAM(s) Oral two times a day  melatonin 6 milliGRAM(s) Oral at bedtime  pantoprazole   Suspension 40 milliGRAM(s) Oral every 12 hours  petrolatum Ophthalmic Ointment 1 Application(s) Left EYE at bedtime  polyethylene glycol 3350 17 Gram(s) Oral daily  QUEtiapine 25 milliGRAM(s) Oral <User Schedule>  QUEtiapine 75 milliGRAM(s) Oral at bedtime  senna Syrup 10 milliLiter(s) Oral at bedtime  sucralfate suspension 1 Gram(s) Enteral Tube two times a day  ticagrelor 60 milliGRAM(s) Oral every 12 hours  valproic  acid Syrup 125 milliGRAM(s) Oral every 8 hours    MEDICATIONS  (PRN):  acetaminophen   Oral Liquid .. 650 milliGRAM(s) Oral every 6 hours PRN Temp greater or equal to 38C (100.4F), Mild Pain (1 - 3), Moderate Pain (4 - 6)  dextrose Oral Gel 15 Gram(s) Oral once PRN Blood Glucose LESS THAN 70 milliGRAM(s)/deciliter  QUEtiapine 12.5 milliGRAM(s) Oral every 6 hours PRN agitation      LABS:                        8.5    6.89  )-----------( 322      ( 23 Mar 2024 05:45 )             27.1     03-23    141  |  101  |  42<H>  ----------------------------<  144<H>  4.2   |  29  |  1.26    Ca    8.7      23 Mar 2024 05:45  Phos  3.5     03-23  Mg     2.30     03-23    TPro  7.1  /  Alb  3.0<L>  /  TBili  0.2  /  DBili  <0.2  /  AST  18  /  ALT  12  /  AlkPhos  118  03-23      Urinalysis Basic - ( 23 Mar 2024 05:45 )    Color: x / Appearance: x / SG: x / pH: x  Gluc: 144 mg/dL / Ketone: x  / Bili: x / Urobili: x   Blood: x / Protein: x / Nitrite: x   Leuk Esterase: x / RBC: x / WBC x   Sq Epi: x / Non Sq Epi: x / Bacteria: x         CHIEF COMPLAINT:Patient is a 89y old  Male who presents with a chief complaint of COVID19, Chest pain (22 Mar 2024 14:23)      INTERVAL EVENTS:     ROS: Seen by bedside during AM rounds     OBJECTIVE:  ICU Vital Signs Last 24 Hrs  T(C): 36.6 (23 Mar 2024 00:00), Max: 36.7 (22 Mar 2024 08:00)  T(F): 97.9 (23 Mar 2024 00:00), Max: 98 (22 Mar 2024 08:00)  HR: 86 (23 Mar 2024 05:43) (72 - 91)  BP: 128/52 (23 Mar 2024 05:43) (104/40 - 146/61)  BP(mean): 70 (23 Mar 2024 05:43) (70 - 79)  ABP: --  ABP(mean): --  RR: 20 (23 Mar 2024 05:43) (16 - 20)  SpO2: 97% (23 Mar 2024 05:43) (96% - 100%)    O2 Parameters below as of 23 Mar 2024 05:43  Patient On (Oxygen Delivery Method): ventilator    O2 Concentration (%): 30      Mode: CPAP with PS, FiO2: 30, PEEP: 5, PS: 16, MAP: 12, PIP: 22    03-22 @ 07:01  -  03-23 @ 07:00  --------------------------------------------------------  IN: 1340 mL / OUT: 960 mL / NET: 380 mL      CAPILLARY BLOOD GLUCOSE      POCT Blood Glucose.: 163 mg/dL (23 Mar 2024 05:38)      PHYSICAL EXAM:  General: NAD   Neck: (+) Trach tube noted, site c/d/i.  Cards: S1/S2, no murmurs   Pulm: Coarse breath sounds b/l . No resp distress. CPAP'ing at the time.   Abdomen: Soft, NTND. (+) PEG noted, site c/d/i. (+)Biliary drain in anterior abdomen draining brownish fluid with some scant blood noted in collection bag.   Extremities: No pedal edema. Extremities warm to touch. Intact distal pulses....  Neurology: Sleeping but roues to tactile stimuli. Nonverbal. Not following commands.   Skin: warm to touch, color appropriate for ethnicity. Refer to RN assessment for further details.      Mode: CPAP with PS  FiO2: 30  PEEP: 5  PS: 16  MAP: 12  PIP: 22      HOSPITAL MEDICATIONS:  MEDICATIONS  (STANDING):  albuterol/ipratropium for Nebulization 3 milliLiter(s) Nebulizer every 12 hours  artificial  tears Solution 1 Drop(s) Left EYE four times a day  aspirin  chewable 81 milliGRAM(s) Enteral Tube daily  carvedilol 6.25 milliGRAM(s) Oral every 12 hours  chlorhexidine 0.12% Liquid 15 milliLiter(s) Oral Mucosa every 12 hours  chlorhexidine 2% Cloths 1 Application(s) Topical daily  dextrose 5%. 1000 milliLiter(s) (100 mL/Hr) IV Continuous <Continuous>  dextrose 5%. 1000 milliLiter(s) (50 mL/Hr) IV Continuous <Continuous>  dextrose 50% Injectable 25 Gram(s) IV Push once  dextrose 50% Injectable 12.5 Gram(s) IV Push once  dextrose 50% Injectable 25 Gram(s) IV Push once  doxazosin 2 milliGRAM(s) Oral at bedtime  escitalopram Solution 10 milliGRAM(s) Oral daily  furosemide    Tablet 20 milliGRAM(s) Oral daily  glucagon  Injectable 1 milliGRAM(s) IntraMuscular once  heparin   Injectable 5000 Unit(s) SubCutaneous every 8 hours  hydrALAZINE 25 milliGRAM(s) Oral every 8 hours  insulin glargine Injectable (LANTUS) 14 Unit(s) SubCutaneous <User Schedule>  insulin lispro (ADMELOG) corrective regimen sliding scale   SubCutaneous every 6 hours  levETIRAcetam  Solution 250 milliGRAM(s) Oral two times a day  melatonin 6 milliGRAM(s) Oral at bedtime  pantoprazole   Suspension 40 milliGRAM(s) Oral every 12 hours  petrolatum Ophthalmic Ointment 1 Application(s) Left EYE at bedtime  polyethylene glycol 3350 17 Gram(s) Oral daily  QUEtiapine 25 milliGRAM(s) Oral <User Schedule>  QUEtiapine 75 milliGRAM(s) Oral at bedtime  senna Syrup 10 milliLiter(s) Oral at bedtime  sucralfate suspension 1 Gram(s) Enteral Tube two times a day  ticagrelor 60 milliGRAM(s) Oral every 12 hours  valproic  acid Syrup 125 milliGRAM(s) Oral every 8 hours    MEDICATIONS  (PRN):  acetaminophen   Oral Liquid .. 650 milliGRAM(s) Oral every 6 hours PRN Temp greater or equal to 38C (100.4F), Mild Pain (1 - 3), Moderate Pain (4 - 6)  dextrose Oral Gel 15 Gram(s) Oral once PRN Blood Glucose LESS THAN 70 milliGRAM(s)/deciliter  QUEtiapine 12.5 milliGRAM(s) Oral every 6 hours PRN agitation      LABS:                        8.5    6.89  )-----------( 322      ( 23 Mar 2024 05:45 )             27.1     03-23    141  |  101  |  42<H>  ----------------------------<  144<H>  4.2   |  29  |  1.26    Ca    8.7      23 Mar 2024 05:45  Phos  3.5     03-23  Mg     2.30     03-23    TPro  7.1  /  Alb  3.0<L>  /  TBili  0.2  /  DBili  <0.2  /  AST  18  /  ALT  12  /  AlkPhos  118  03-23      Urinalysis Basic - ( 23 Mar 2024 05:45 )    Color: x / Appearance: x / SG: x / pH: x  Gluc: 144 mg/dL / Ketone: x  / Bili: x / Urobili: x   Blood: x / Protein: x / Nitrite: x   Leuk Esterase: x / RBC: x / WBC x   Sq Epi: x / Non Sq Epi: x / Bacteria: x         CHIEF COMPLAINT:Patient is a 89y old  Male who presents with a chief complaint of COVID19, Chest pain (22 Mar 2024 14:23)      INTERVAL EVENTS: reportedly agitated overnight minimally responsive to Seroquel then improved s/p Ativan. More somnolent this morning but arousable. Noted on CPAP this morning, unclear if was switched to AC mode last night. Bloody output noted in Perc Lynsey collection bag with some resistance with flushing.     ROS: Seen by bedside during AM rounds     OBJECTIVE:  ICU Vital Signs Last 24 Hrs  T(C): 36.6 (23 Mar 2024 00:00), Max: 36.7 (22 Mar 2024 08:00)  T(F): 97.9 (23 Mar 2024 00:00), Max: 98 (22 Mar 2024 08:00)  HR: 86 (23 Mar 2024 05:43) (72 - 91)  BP: 128/52 (23 Mar 2024 05:43) (104/40 - 146/61)  BP(mean): 70 (23 Mar 2024 05:43) (70 - 79)  ABP: --  ABP(mean): --  RR: 20 (23 Mar 2024 05:43) (16 - 20)  SpO2: 97% (23 Mar 2024 05:43) (96% - 100%)    O2 Parameters below as of 23 Mar 2024 05:43  Patient On (Oxygen Delivery Method): ventilator    O2 Concentration (%): 30      Mode: CPAP with PS, FiO2: 30, PEEP: 5, PS: 16, MAP: 12, PIP: 22    03-22 @ 07:01  -  03-23 @ 07:00  --------------------------------------------------------  IN: 1340 mL / OUT: 960 mL / NET: 380 mL      CAPILLARY BLOOD GLUCOSE      POCT Blood Glucose.: 163 mg/dL (23 Mar 2024 05:38)      PHYSICAL EXAM:  General: NAD   Neck: (+) Trach tube noted, site c/d/i.  Cards: S1/S2, no murmurs   Pulm: Coarse breath sounds b/l . No resp distress. CPAP'ing at the time.   Abdomen: Soft, NTND. (+) PEG noted, site c/d/i. (+)Biliary drain in anterior abdomen draining brownish fluid with some scant blood noted in collection bag.   Extremities: No pedal edema. Extremities warm to touch.   Neurology: Sleeping but roues to tactile stimuli. Nonverbal. Not following commands.   Skin: warm to touch, color appropriate for ethnicity. Refer to RN assessment for further details.      Mode: CPAP with PS  FiO2: 30  PEEP: 5  PS: 16  MAP: 12  PIP: 22      HOSPITAL MEDICATIONS:  MEDICATIONS  (STANDING):  albuterol/ipratropium for Nebulization 3 milliLiter(s) Nebulizer every 12 hours  artificial  tears Solution 1 Drop(s) Left EYE four times a day  aspirin  chewable 81 milliGRAM(s) Enteral Tube daily  carvedilol 6.25 milliGRAM(s) Oral every 12 hours  chlorhexidine 0.12% Liquid 15 milliLiter(s) Oral Mucosa every 12 hours  chlorhexidine 2% Cloths 1 Application(s) Topical daily  dextrose 5%. 1000 milliLiter(s) (100 mL/Hr) IV Continuous <Continuous>  dextrose 5%. 1000 milliLiter(s) (50 mL/Hr) IV Continuous <Continuous>  dextrose 50% Injectable 25 Gram(s) IV Push once  dextrose 50% Injectable 12.5 Gram(s) IV Push once  dextrose 50% Injectable 25 Gram(s) IV Push once  doxazosin 2 milliGRAM(s) Oral at bedtime  escitalopram Solution 10 milliGRAM(s) Oral daily  furosemide    Tablet 20 milliGRAM(s) Oral daily  glucagon  Injectable 1 milliGRAM(s) IntraMuscular once  heparin   Injectable 5000 Unit(s) SubCutaneous every 8 hours  hydrALAZINE 25 milliGRAM(s) Oral every 8 hours  insulin glargine Injectable (LANTUS) 14 Unit(s) SubCutaneous <User Schedule>  insulin lispro (ADMELOG) corrective regimen sliding scale   SubCutaneous every 6 hours  levETIRAcetam  Solution 250 milliGRAM(s) Oral two times a day  melatonin 6 milliGRAM(s) Oral at bedtime  pantoprazole   Suspension 40 milliGRAM(s) Oral every 12 hours  petrolatum Ophthalmic Ointment 1 Application(s) Left EYE at bedtime  polyethylene glycol 3350 17 Gram(s) Oral daily  QUEtiapine 25 milliGRAM(s) Oral <User Schedule>  QUEtiapine 75 milliGRAM(s) Oral at bedtime  senna Syrup 10 milliLiter(s) Oral at bedtime  sucralfate suspension 1 Gram(s) Enteral Tube two times a day  ticagrelor 60 milliGRAM(s) Oral every 12 hours  valproic  acid Syrup 125 milliGRAM(s) Oral every 8 hours    MEDICATIONS  (PRN):  acetaminophen   Oral Liquid .. 650 milliGRAM(s) Oral every 6 hours PRN Temp greater or equal to 38C (100.4F), Mild Pain (1 - 3), Moderate Pain (4 - 6)  dextrose Oral Gel 15 Gram(s) Oral once PRN Blood Glucose LESS THAN 70 milliGRAM(s)/deciliter  QUEtiapine 12.5 milliGRAM(s) Oral every 6 hours PRN agitation      LABS:                        8.5    6.89  )-----------( 322      ( 23 Mar 2024 05:45 )             27.1     03-23    141  |  101  |  42<H>  ----------------------------<  144<H>  4.2   |  29  |  1.26    Ca    8.7      23 Mar 2024 05:45  Phos  3.5     03-23  Mg     2.30     03-23    TPro  7.1  /  Alb  3.0<L>  /  TBili  0.2  /  DBili  <0.2  /  AST  18  /  ALT  12  /  AlkPhos  118  03-23      Urinalysis Basic - ( 23 Mar 2024 05:45 )    Color: x / Appearance: x / SG: x / pH: x  Gluc: 144 mg/dL / Ketone: x  / Bili: x / Urobili: x   Blood: x / Protein: x / Nitrite: x   Leuk Esterase: x / RBC: x / WBC x   Sq Epi: x / Non Sq Epi: x / Bacteria: x

## 2024-03-23 NOTE — CHART NOTE - NSCHARTNOTEFT_GEN_A_CORE
Called by ACP ana to discuss corrected VPA  Albumin 3 and VPA 36.3 on 3/23 resulting in a corrected VPA level of 84  Keep Depakote order as is    PRNs required overnight  As per chart review, patient responds best to low dose ativan, can continue at this time for severe agitation and CL will follow.

## 2024-03-23 NOTE — PROGRESS NOTE ADULT - ASSESSMENT
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 Course ultimately complicated by progressive WOB and O2 demand and patient intubated and transferred to MICU (1/22). Course further complicated by acute cholecystitis and s/p Perc Lynsey with IR on (1/26), HFrEF 38, pulmonary edema, superimposed PNA, failed extubation failed and prolonged vent time and s/p trach (2/13). Patient transferred to RCU (2/16) and s/p PEG (2/20). Course complicated by volume overload and diuresis and GDMT continued and HFrEF 45 with improvement. Course further complicated by agitation from underlying vascular dementia/ hospital delirium.     NEUROLOGY   # Encephalopathy   - Hx of CVA with no residual deficits per family, however per family worsening confusion at home over the past 6 months (becoming disoriented to time) but prior to admission has been more confused, talking about seeing people that are not present.  - CTH (1/31) with no evidence of acute infarct  - Continue on ASA and Brilinta  - Holding Neurontin, Memantine and Oxycodone in setting of somnolence  - 3/17: obtunded overnight, CT Head: Mild chronic microvascular changes without evidence of an acute transcortical infarction or hemorrhage.  - VBG and Ammonia level grossly unremarkable    # ICA stenosis   - CTA NECK (1/31) with moderate to severe narrowing left internal carotid at the origin. Severe narrowing right internal carotid by a tandem lesion 1.5 cm above the bifurcation with a narrowed internal carotid beyond the lesion. Extensive calcified plaque both cavernous and supraclinoid carotid arteries with narrowing but no occlusion. Mild narrowing proximal right M1 segment. Moderate narrowing both vertebral V4 segments. No perfusion abnormality at the Tmax 6 second threshold, but moderate hypoperfusion in the right MCA territory at the 4 second threshold.   - Continue on ASA and Brilinta    # Myoclonic jerks   - Hx of myoclonic jerks post CVAs   - EEG (1/18-2/6) negative for seizures  - Continue on Keppra and per neuro/ psych wishes to wean, family in agreement   - Currently down titrating Keppra 500 mg BID, now Keppra 250 mg BID 3/13 - )    # Depression/ Insomnia  - Continue on Lexapro per home medication   - Course complicated by agitation and pulling on tubes   - Home Seroquel discontinued as patient now on Valproic acid syrup   - S/p Ativan 0.5mg PO Q6H PRN and Haldol 0.25 mg Q6H for severe agitation, then s/p IM Zypexa 1.25 Q8H PRN for agitation    - Supportive care     # Agitation in setting of delirium / underlying vascular dementia   - C/w  mg TID, BH following   - monitor platelets, LFTs while on Depakote   - Recently, Pt had responded much better to Ativan than Zyprexa for agitation, however became obtunded, so Ativan d/c Ativan 0.5mg Q6H PRN  - 3/18 Start Seroquel 25 mg in AM and Seroquel 50 mg QHS   - 3/20 restless /agitated overnight Ativan x 2 no effect - DW psych can do Seroquel 12.5 PRN/ increase night Seroquel to 75mg - continues on 1:1 restless with  family  - 3/21-3/22 Calm, remains delirious no recent episodes of agitation     CARDIOLOGY   # Vasoplegic vs septic shock  # Hx of HTN   - Weaned off pressors in ICU and now with mild hypertension   - Continue on coreg and hydral as below   - Strict BP control given moderate AS  - Increased vascular congestion on CXR (3/9) so will give additional Lasix 20mg PO x1 (3/10)  - Diastolic bp low <50 hold Hydralazine and monitor bp - may need to decrease of dc     # AFIB RVR   # Bifascicular Block with Medtronic PPM   - AFIB RVR episodes and PPM interrogated (2/20) by EP with similar episodes  - Previous admission in 6/2022 with cardiogenic syncope second to bifasicular block, however now with PPM and AV myron blockers ok.   - Continue on lopressor 12.5mg BID however replaced with coreg second to HFrEF   - AC discussed at length however with recent GIB will hold for now     # HFrEF 38 likely stress induced?   # Mild to moderate AS   - ECHO (12/2023) with EF 62 with normal LVSF and mild to moderate AS   - ECHO (2/2024) with EF 38, moderate LVSD with global LV hypokinesis, mildly reduced RVSF (TAPSE 1.3), mild to moderate MR, mild AR, and moderate AS.   - RPT ECHO (3/4) with EF 45 and mild to moderate LVSD   - Continue on Lasix 20 QD, coreg 6.25 and hydralazine 50 (increased 3/4)   - Hold isordil and up titrate above medications first     # CAD with CABG   - CATH (5/2022) with dLAD 70, SVG graft to OM1 with 90 in stent stenosis s/p PCI and GEMMA placement, and LIMA graft to mLAD with no disease.   - Continue on ASA and Brilinta    # Prolonged QTC (Resolved)  - ECG (3/2) with QTC 616ms (corrected using bazzet 490ms)   - ECG (3/3) with QTC 634ms (corrected using bazzet 490ms)   - ECG (3/11) with QTC 490ms, EKG (3/15) QTc 498ms, EKG (3/21) 486ms  - Monitor QTc/ daily EKGs       RESPIRATORY   # Acute respiratory failure second to SARS-COV-2 vs pulm edema vs PNA  - Presented with COVID complicated by sepsis second to acute lynsey and worsening respiratory failure second to pulmonary edema requiring intubation.   - Prolonged intubation and s/p tracheostomy (2/13)   - CT CHEST 1/16 with new small bilateral pleural effusions/ atelectasis/ patchy bilateral ground-glass opacities are consistent with pulmonary edema.  - Completed ABX and COVID regimen as below   - Continue on vent and allow SBT as tolerated   - Continue on nebs and hold further HTS given intermittent hemoptysis  - Continue on diuresis as above    # Mild hemoptysis likely from suction trauma   - s/p bronch (2/18) with no active bleed  - Hemoptysis improved with TXA and holding further HTS as above   - Tiny hemoptysis second to suction trauma imporving  - Careful with suctioning   - Recurrent hemoptysis (3/9) so ordered for TXA nebs again however hemoptysis appeared self limited and stopped before nebs even given    HEENT   # L eye subconjunctival hemorrhage   - Continue on artifical tears and Lacrilube   - Optho eval appreciated     GI  # Nutrition/ Dysphagia   - PEG placed by GI on 2/20 and tube feeds continued.   - Loose stools and Banatrol continued     # UGIB   - EGD (1/2) with esophagitis and bleeding Dieulafoy's lesion s/p clips.   - Continue on PPI and carafate     # SMA calcification   - CTA demonstrating mesenteric fat stranding associated with ascending/transverse colon  - Diagnostic laparoscopy (12/24) with small bowel and visible colon viable, some inflammation of omentum in RUQ  - SMA Angiogram and stent placed (12/25).   - Continue on ASA and Brilinta     # Acute Cholecystitis   - CT (12/26) with distended GB with cholelithiasis and sludge   - HIDA (12/29) POSITIVE for acute cholecystitis   - Case discussed with IR at length and s/p PERC LYNSEY (1/26)   - Completed zosyn course (12/24-12/31)   - PERC LYNSEY tube to stay in until surgical candidate for cholecystectomy to prevent recurrence     / RENAL   # CKD   - CRE at baseline   - Monitor renal function and UOP   - Aranesp SQ x1 (3/8)    # Hypernatremia (resolved)   - s/p  Q4H  - Monitor closely with diuresis as above   - Stop FWF (3/15) given c/f worsening vol overload     INFECTIOUS DISEASE   # COVID with superimposed PNA   # ESBL Kleb PNA   - COVID POSITIVE (12/23) and completed remdesivir x 1 dose and paxlovid course.   - WOB worsened as above requiring intubation and cultures negative. Zosyn completed empirically however hypothermic (2/11) and BCx, UA, RVP and BAL negative.   - Completed additional zosyn (2/12-2/19) empirically   - Fever spike and thick secretions and SCX (2/26) with ESBL klebs.   - s/p Zosyn (2/27 - 2/29) but resistant and completed Erta (2/29 - 3/6)     HEME  # AOCD   - Monitor HH   - DVT PPX with HSQ     ENDOCRINE   # DM2 A1C 9.3   - Continue on Lantus 14 and ISS     SKIN/ TUBES   - Trach (2/13)   - PEG (2/20)   - PERC LYNSEY (1/26 - )     ETHICS   - FULL CODE     DISPO - vent facility and family aware. SW with accepting facility however pending available bed.  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 Course ultimately complicated by progressive WOB and O2 demand and patient intubated and transferred to MICU (1/22). Course further complicated by acute cholecystitis and s/p Perc Lynsey with IR on (1/26), HFrEF 38, pulmonary edema, superimposed PNA, failed extubation and prolonged vent time and s/p trach (2/13). Patient transferred to RCU (2/16) and s/p PEG (2/20). Course complicated by volume overload and diuresis and GDMT continued and HFrEF 45 with improvement. Course further complicated by agitation from underlying vascular dementia/ hospital delirium.     NEUROLOGY   # Encephalopathy   - Hx of CVA with no residual deficits per family, however per family worsening confusion at home over the past 6 months (becoming disoriented to time) but prior to admission has been more confused, talking about seeing people that are not present.  - CTH (1/31) with no evidence of acute infarct  - Continue on ASA and Brilinta  - Holding Neurontin, Memantine and Oxycodone in setting of somnolence  - 3/17: obtunded overnight, CT Head: Mild chronic microvascular changes without evidence of an acute transcortical infarction or hemorrhage.  - VBG and Ammonia level grossly unremarkable    # ICA stenosis   - CTA NECK (1/31) with moderate to severe narrowing left internal carotid at the origin. Severe narrowing right internal carotid by a tandem lesion 1.5 cm above the bifurcation with a narrowed internal carotid beyond the lesion. Extensive calcified plaque both cavernous and supraclinoid carotid arteries with narrowing but no occlusion. Mild narrowing proximal right M1 segment. Moderate narrowing both vertebral V4 segments. No perfusion abnormality at the Tmax 6 second threshold, but moderate hypoperfusion in the right MCA territory at the 4 second threshold.   - Continue on ASA and Brilinta    # Myoclonic jerks   - Hx of myoclonic jerks post CVAs   - EEG (1/18-2/6) negative for seizures  - Continue on Keppra and per neuro/ psych wishes to wean, family in agreement   - Currently down titrating Keppra 500 mg BID, now Keppra 250 mg BID 3/13 - )    # Depression/ Insomnia  - Continue on Lexapro per home medication   - Course complicated by agitation and pulling on tubes   - Home Seroquel discontinued as patient now on Valproic acid syrup   - S/p Ativan 0.5mg PO Q6H PRN and Haldol 0.25 mg Q6H for severe agitation, then s/p IM Zypexa 1.25 Q8H PRN for agitation    - Supportive care     # Agitation in setting of delirium / underlying vascular dementia   - C/w  mg TID, BH following   - monitor platelets, LFTs while on Depakote   - Recently, Pt had responded much better to Ativan than Zyprexa for agitation, however became obtunded, so Ativan d/c Ativan 0.5mg Q6H PRN  - 3/18 Start Seroquel 25 mg in AM and Seroquel 50 mg QHS   - 3/20 restless /agitated overnight Ativan x 2 no effect - DW psych can do Seroquel 12.5 PRN/ increase night Seroquel to 75mg - continues on 1:1 restless with  family  - 3/21-3/22 Calm, remains delirious no recent episodes of agitation -   - Became agitated with minimal response to Seroquel and required Ativan with improvement (overnight 3/22)    CARDIOLOGY   # Vasoplegic vs septic shock  # Hx of HTN   - Weaned off pressors in ICU and now with mild hypertension   - Continue on coreg and hydral as below   - Strict BP control given moderate AS  - Increased vascular congestion on CXR (3/9) so will give additional Lasix 20mg PO x1 (3/10)  - Diastolic bp low <50 hold Hydralazine and monitor bp - may need to decrease of dc     # AFIB RVR   # Bifascicular Block with Medtronic PPM   - AFIB RVR episodes and PPM interrogated (2/20) by EP with similar episodes  - Previous admission in 6/2022 with cardiogenic syncope second to bifasicular block, however now with PPM and AV myron blockers ok.   - Continue on lopressor 12.5mg BID however replaced with coreg second to HFrEF   - AC discussed at length however with recent GIB will hold for now     # HFrEF 38 likely stress induced?   # Mild to moderate AS   - ECHO (12/2023) with EF 62 with normal LVSF and mild to moderate AS   - ECHO (2/2024) with EF 38, moderate LVSD with global LV hypokinesis, mildly reduced RVSF (TAPSE 1.3), mild to moderate MR, mild AR, and moderate AS.   - RPT ECHO (3/4) with EF 45 and mild to moderate LVSD   - Continue on Lasix 20 QD, coreg 6.25 and hydralazine 50 (increased 3/4)   - Hold isordil and up titrate above medications first     # CAD with CABG   - CATH (5/2022) with dLAD 70, SVG graft to OM1 with 90 in stent stenosis s/p PCI and GEMMA placement, and LIMA graft to mLAD with no disease.   - Continue on ASA and Brilinta    # Prolonged QTC (Resolved)  - ECG (3/2) with QTC 616ms (corrected using bazzet 490ms)   - ECG (3/3) with QTC 634ms (corrected using bazzet 490ms)   - ECG (3/11) with QTC 490ms, EKG (3/15) QTc 498ms, EKG (3/21) 486ms  - Monitor QTc/ daily EKGs       RESPIRATORY   # Acute respiratory failure second to SARS-COV-2 vs pulm edema vs PNA  - Presented with COVID complicated by sepsis second to acute lynsey and worsening respiratory failure second to pulmonary edema requiring intubation.   - Prolonged intubation and s/p tracheostomy (2/13)   - CT CHEST 1/16 with new small bilateral pleural effusions/ atelectasis/ patchy bilateral ground-glass opacities are consistent with pulmonary edema.  - Completed ABX and COVID regimen as below   - Continue on vent and allow SBT as tolerated   - Continue on nebs and hold further HTS given intermittent hemoptysis  - Continue on diuresis as above    # Mild hemoptysis likely from suction trauma   - s/p bronch (2/18) with no active bleed  - Hemoptysis improved with TXA and holding further HTS as above   - Tiny hemoptysis second to suction trauma imporving  - Careful with suctioning   - Recurrent hemoptysis (3/9) so ordered for TXA nebs again however hemoptysis appeared self limited and stopped before nebs even given    HEENT   # L eye subconjunctival hemorrhage   - Continue on artifical tears and Lacrilube   - Optho eval appreciated     GI  # Nutrition/ Dysphagia   - PEG placed by GI on 2/20 and tube feeds continued.   - Loose stools and Banatrol continued     # UGIB   - EGD (1/2) with esophagitis and bleeding Dieulafoy's lesion s/p clips.   - Continue on PPI and carafate     # SMA calcification   - CTA demonstrating mesenteric fat stranding associated with ascending/transverse colon  - Diagnostic laparoscopy (12/24) with small bowel and visible colon viable, some inflammation of omentum in RUQ  - SMA Angiogram and stent placed (12/25).   - Continue on ASA and Brilinta     # Acute Cholecystitis   - CT (12/26) with distended GB with cholelithiasis and sludge   - HIDA (12/29) POSITIVE for acute cholecystitis   - Case discussed with IR at length and s/p PERC LYNSEY (1/26)   - Completed zosyn course (12/24-12/31)   - PERC LYNSEY tube to stay in until surgical candidate for cholecystectomy to prevent recurrence   - Noted with some bloody perc lynsey output so IR consulted, pending eval (3/23)    / RENAL   # CKD   - CRE at baseline   - Monitor renal function and UOP   - Aranesp SQ x1 (3/8)    # Hypernatremia (resolved)   - s/p  Q4H  - Monitor closely with diuresis as above   - Stop FWF (3/15) given c/f worsening vol overload     INFECTIOUS DISEASE   # COVID with superimposed PNA   # ESBL Kleb PNA   - COVID POSITIVE (12/23) and completed remdesivir x 1 dose and paxlovid course.   - WOB worsened as above requiring intubation and cultures negative. Zosyn completed empirically however hypothermic (2/11) and BCx, UA, RVP and BAL negative.   - Completed additional zosyn (2/12-2/19) empirically   - Fever spike and thick secretions and SCX (2/26) with ESBL klebs.   - s/p Zosyn (2/27 - 2/29) but resistant and completed Erta (2/29 - 3/6)     HEME  # AOCD   - Monitor HH   - DVT PPX with HSQ     ENDOCRINE   # DM2 A1C 9.3   - Continue on Lantus 14 and ISS     SKIN/ TUBES   - Trach (2/13)   - PEG (2/20)   - PERC LYNSEY (1/26 - )     ETHICS   - FULL CODE     DISPO - vent facility and family aware. SW with accepting facility however pending available bed.

## 2024-03-24 LAB
ALBUMIN SERPL ELPH-MCNC: 3 G/DL — LOW (ref 3.3–5)
ALP SERPL-CCNC: 117 U/L — SIGNIFICANT CHANGE UP (ref 40–120)
ALT FLD-CCNC: 11 U/L — SIGNIFICANT CHANGE UP (ref 4–41)
ANION GAP SERPL CALC-SCNC: 10 MMOL/L — SIGNIFICANT CHANGE UP (ref 7–14)
AST SERPL-CCNC: 17 U/L — SIGNIFICANT CHANGE UP (ref 4–40)
BASOPHILS # BLD AUTO: 0.05 K/UL — SIGNIFICANT CHANGE UP (ref 0–0.2)
BASOPHILS NFR BLD AUTO: 0.7 % — SIGNIFICANT CHANGE UP (ref 0–2)
BILIRUB SERPL-MCNC: 0.3 MG/DL — SIGNIFICANT CHANGE UP (ref 0.2–1.2)
BUN SERPL-MCNC: 44 MG/DL — HIGH (ref 7–23)
CALCIUM SERPL-MCNC: 9.1 MG/DL — SIGNIFICANT CHANGE UP (ref 8.4–10.5)
CHLORIDE SERPL-SCNC: 100 MMOL/L — SIGNIFICANT CHANGE UP (ref 98–107)
CO2 SERPL-SCNC: 30 MMOL/L — SIGNIFICANT CHANGE UP (ref 22–31)
CREAT SERPL-MCNC: 1.25 MG/DL — SIGNIFICANT CHANGE UP (ref 0.5–1.3)
EGFR: 55 ML/MIN/1.73M2 — LOW
EOSINOPHIL # BLD AUTO: 0.58 K/UL — HIGH (ref 0–0.5)
EOSINOPHIL NFR BLD AUTO: 8.4 % — HIGH (ref 0–6)
GLUCOSE BLDC GLUCOMTR-MCNC: 115 MG/DL — HIGH (ref 70–99)
GLUCOSE BLDC GLUCOMTR-MCNC: 124 MG/DL — HIGH (ref 70–99)
GLUCOSE BLDC GLUCOMTR-MCNC: 126 MG/DL — HIGH (ref 70–99)
GLUCOSE BLDC GLUCOMTR-MCNC: 142 MG/DL — HIGH (ref 70–99)
GLUCOSE BLDC GLUCOMTR-MCNC: 161 MG/DL — HIGH (ref 70–99)
GLUCOSE SERPL-MCNC: 124 MG/DL — HIGH (ref 70–99)
HCT VFR BLD CALC: 27.5 % — LOW (ref 39–50)
HGB BLD-MCNC: 8.7 G/DL — LOW (ref 13–17)
IANC: 3.45 K/UL — SIGNIFICANT CHANGE UP (ref 1.8–7.4)
IMM GRANULOCYTES NFR BLD AUTO: 0.4 % — SIGNIFICANT CHANGE UP (ref 0–0.9)
LYMPHOCYTES # BLD AUTO: 1.95 K/UL — SIGNIFICANT CHANGE UP (ref 1–3.3)
LYMPHOCYTES # BLD AUTO: 28.3 % — SIGNIFICANT CHANGE UP (ref 13–44)
MAGNESIUM SERPL-MCNC: 2.3 MG/DL — SIGNIFICANT CHANGE UP (ref 1.6–2.6)
MCHC RBC-ENTMCNC: 28.9 PG — SIGNIFICANT CHANGE UP (ref 27–34)
MCHC RBC-ENTMCNC: 31.6 GM/DL — LOW (ref 32–36)
MCV RBC AUTO: 91.4 FL — SIGNIFICANT CHANGE UP (ref 80–100)
MONOCYTES # BLD AUTO: 0.82 K/UL — SIGNIFICANT CHANGE UP (ref 0–0.9)
MONOCYTES NFR BLD AUTO: 11.9 % — SIGNIFICANT CHANGE UP (ref 2–14)
NEUTROPHILS # BLD AUTO: 3.45 K/UL — SIGNIFICANT CHANGE UP (ref 1.8–7.4)
NEUTROPHILS NFR BLD AUTO: 50.3 % — SIGNIFICANT CHANGE UP (ref 43–77)
NRBC # BLD: 0 /100 WBCS — SIGNIFICANT CHANGE UP (ref 0–0)
NRBC # FLD: 0 K/UL — SIGNIFICANT CHANGE UP (ref 0–0)
PHOSPHATE SERPL-MCNC: 4.1 MG/DL — SIGNIFICANT CHANGE UP (ref 2.5–4.5)
PLATELET # BLD AUTO: 364 K/UL — SIGNIFICANT CHANGE UP (ref 150–400)
POTASSIUM SERPL-MCNC: 4.2 MMOL/L — SIGNIFICANT CHANGE UP (ref 3.5–5.3)
POTASSIUM SERPL-SCNC: 4.2 MMOL/L — SIGNIFICANT CHANGE UP (ref 3.5–5.3)
PROT SERPL-MCNC: 7.4 G/DL — SIGNIFICANT CHANGE UP (ref 6–8.3)
RBC # BLD: 3.01 M/UL — LOW (ref 4.2–5.8)
RBC # FLD: 15.6 % — HIGH (ref 10.3–14.5)
SODIUM SERPL-SCNC: 140 MMOL/L — SIGNIFICANT CHANGE UP (ref 135–145)
WBC # BLD: 6.88 K/UL — SIGNIFICANT CHANGE UP (ref 3.8–10.5)
WBC # FLD AUTO: 6.88 K/UL — SIGNIFICANT CHANGE UP (ref 3.8–10.5)

## 2024-03-24 PROCEDURE — 99233 SBSQ HOSP IP/OBS HIGH 50: CPT | Mod: FS

## 2024-03-24 PROCEDURE — 74176 CT ABD & PELVIS W/O CONTRAST: CPT | Mod: 26

## 2024-03-24 PROCEDURE — 74018 RADEX ABDOMEN 1 VIEW: CPT | Mod: 26

## 2024-03-24 RX ORDER — CHLORHEXIDINE GLUCONATE 213 G/1000ML
15 SOLUTION TOPICAL EVERY 12 HOURS
Refills: 0 | Status: DISCONTINUED | OUTPATIENT
Start: 2024-03-24 | End: 2024-03-27

## 2024-03-24 RX ADMIN — QUETIAPINE FUMARATE 12.5 MILLIGRAM(S): 200 TABLET, FILM COATED ORAL at 04:55

## 2024-03-24 RX ADMIN — Medication 81 MILLIGRAM(S): at 11:23

## 2024-03-24 RX ADMIN — Medication 3 MILLILITER(S): at 07:16

## 2024-03-24 RX ADMIN — PANTOPRAZOLE SODIUM 40 MILLIGRAM(S): 20 TABLET, DELAYED RELEASE ORAL at 05:39

## 2024-03-24 RX ADMIN — Medication 0.25 MILLIGRAM(S): at 00:59

## 2024-03-24 RX ADMIN — HEPARIN SODIUM 5000 UNIT(S): 5000 INJECTION INTRAVENOUS; SUBCUTANEOUS at 14:00

## 2024-03-24 RX ADMIN — Medication 125 MILLIGRAM(S): at 21:11

## 2024-03-24 RX ADMIN — SENNA PLUS 10 MILLILITER(S): 8.6 TABLET ORAL at 21:13

## 2024-03-24 RX ADMIN — CHLORHEXIDINE GLUCONATE 15 MILLILITER(S): 213 SOLUTION TOPICAL at 05:39

## 2024-03-24 RX ADMIN — QUETIAPINE FUMARATE 25 MILLIGRAM(S): 200 TABLET, FILM COATED ORAL at 09:19

## 2024-03-24 RX ADMIN — Medication 125 MILLIGRAM(S): at 05:42

## 2024-03-24 RX ADMIN — Medication 25 MILLIGRAM(S): at 05:40

## 2024-03-24 RX ADMIN — Medication 1 DROP(S): at 05:41

## 2024-03-24 RX ADMIN — Medication 2: at 05:51

## 2024-03-24 RX ADMIN — TICAGRELOR 60 MILLIGRAM(S): 90 TABLET ORAL at 05:40

## 2024-03-24 RX ADMIN — Medication 6 MILLIGRAM(S): at 21:12

## 2024-03-24 RX ADMIN — INSULIN GLARGINE 14 UNIT(S): 100 INJECTION, SOLUTION SUBCUTANEOUS at 11:23

## 2024-03-24 RX ADMIN — HEPARIN SODIUM 5000 UNIT(S): 5000 INJECTION INTRAVENOUS; SUBCUTANEOUS at 05:39

## 2024-03-24 RX ADMIN — Medication 125 MILLIGRAM(S): at 13:59

## 2024-03-24 RX ADMIN — LEVETIRACETAM 250 MILLIGRAM(S): 250 TABLET, FILM COATED ORAL at 17:25

## 2024-03-24 RX ADMIN — HEPARIN SODIUM 5000 UNIT(S): 5000 INJECTION INTRAVENOUS; SUBCUTANEOUS at 21:12

## 2024-03-24 RX ADMIN — Medication 3 MILLILITER(S): at 18:52

## 2024-03-24 RX ADMIN — Medication 1 DROP(S): at 11:24

## 2024-03-24 RX ADMIN — LEVETIRACETAM 250 MILLIGRAM(S): 250 TABLET, FILM COATED ORAL at 05:39

## 2024-03-24 RX ADMIN — Medication 1 GRAM(S): at 05:40

## 2024-03-24 RX ADMIN — Medication 1 APPLICATION(S): at 21:13

## 2024-03-24 RX ADMIN — Medication 2 MILLIGRAM(S): at 21:13

## 2024-03-24 RX ADMIN — POLYETHYLENE GLYCOL 3350 17 GRAM(S): 17 POWDER, FOR SOLUTION ORAL at 11:23

## 2024-03-24 RX ADMIN — PANTOPRAZOLE SODIUM 40 MILLIGRAM(S): 20 TABLET, DELAYED RELEASE ORAL at 17:24

## 2024-03-24 RX ADMIN — CARVEDILOL PHOSPHATE 6.25 MILLIGRAM(S): 80 CAPSULE, EXTENDED RELEASE ORAL at 05:40

## 2024-03-24 RX ADMIN — CHLORHEXIDINE GLUCONATE 1 APPLICATION(S): 213 SOLUTION TOPICAL at 11:29

## 2024-03-24 RX ADMIN — CHLORHEXIDINE GLUCONATE 15 MILLILITER(S): 213 SOLUTION TOPICAL at 17:24

## 2024-03-24 RX ADMIN — Medication 20 MILLIGRAM(S): at 05:41

## 2024-03-24 RX ADMIN — QUETIAPINE FUMARATE 75 MILLIGRAM(S): 200 TABLET, FILM COATED ORAL at 21:12

## 2024-03-24 RX ADMIN — Medication 1 DROP(S): at 17:23

## 2024-03-24 RX ADMIN — Medication 1 DROP(S): at 01:00

## 2024-03-24 RX ADMIN — ESCITALOPRAM OXALATE 10 MILLIGRAM(S): 10 TABLET, FILM COATED ORAL at 11:43

## 2024-03-24 RX ADMIN — TICAGRELOR 60 MILLIGRAM(S): 90 TABLET ORAL at 17:25

## 2024-03-24 RX ADMIN — CARVEDILOL PHOSPHATE 6.25 MILLIGRAM(S): 80 CAPSULE, EXTENDED RELEASE ORAL at 17:25

## 2024-03-24 RX ADMIN — Medication 0.25 MILLIGRAM(S): at 04:55

## 2024-03-24 RX ADMIN — Medication 1 GRAM(S): at 17:26

## 2024-03-24 NOTE — PROGRESS NOTE ADULT - NS ATTEND AMEND GEN_ALL_CORE FT
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  Course further complicated by acute cholecystitis and s/p Perc Lynsey with IR on (1/26), HFrEF 38, pulmonary edema, superimposed PNA, hypoxic respiratory failure reqiuring intubation and prolonged vent time and s/p trach (2/13). Patient transferred to RCU (2/16) and s/p PEG (2/20).  Course further complicated by agitation from underlying vascular dementia/ hospital delirium.     Neuro: hx of CVA, encephalopathy. Currently awake, periods of intermittent agitation, on 1:1. On Depakote 125 q8h. Keppra being tapered as well - now on 250 q12h. Appreciate behavioral health input.  Seroquel resumed with good effect - titrating up as tolerated. Currently seroquel 25 mg am, 75 mg qhs Monitoring QTc. Overnight required multiple doses of ativan. Discussed with behavioral health, continue current dose for now with prns    CVS:  CAD, Afib, HFrEF - TTE 45% 3/4, coreg, hydralazine, not on AC due to GIB, lasix 20 mg daily, on ASA and Brillinta. Episodes of decreased diastolic pressure - hold hydralazine today, consider increasing coreg to 12.5 mg bid if BP becomes elevated   GI: on PPI and carafate, cholecystostomy tube in place - slightly more reddish output today,Hb stable, appreciate IR assistance. Check CT abd pelvis today   Resp: on VCAC - trials of PS daily.  check VBG  ID: off antibiotics, completed etrapenem on 3/6, recently treated w/ Klebsiella PNA.   rest of plan as above.

## 2024-03-24 NOTE — PROGRESS NOTE ADULT - ASSESSMENT
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 Course ultimately complicated by progressive WOB and O2 demand and patient intubated and transferred to MICU (1/22). Course further complicated by acute cholecystitis and s/p Perc Lynsey with IR on (1/26), HFrEF 38, pulmonary edema, superimposed PNA, failed extubation and prolonged vent time and s/p trach (2/13). Patient transferred to RCU (2/16) and s/p PEG (2/20). Course complicated by volume overload and diuresis and GDMT continued and HFrEF 45 with improvement. Course further complicated by agitation from underlying vascular dementia/ hospital delirium.     NEUROLOGY   # Encephalopathy   - Hx of CVA with no residual deficits per family, however per family worsening confusion at home over the past 6 months (becoming disoriented to time) but prior to admission has been more confused, talking about seeing people that are not present.  - CTH (1/31) with no evidence of acute infarct  - Continue on ASA and Brilinta  - Holding Neurontin, Memantine and Oxycodone in setting of somnolence  - 3/17: obtunded overnight, CT Head: Mild chronic microvascular changes without evidence of an acute transcortical infarction or hemorrhage.  - VBG and Ammonia level grossly unremarkable    # ICA stenosis   - CTA NECK (1/31) with moderate to severe narrowing left internal carotid at the origin. Severe narrowing right internal carotid by a tandem lesion 1.5 cm above the bifurcation with a narrowed internal carotid beyond the lesion. Extensive calcified plaque both cavernous and supraclinoid carotid arteries with narrowing but no occlusion. Mild narrowing proximal right M1 segment. Moderate narrowing both vertebral V4 segments. No perfusion abnormality at the Tmax 6 second threshold, but moderate hypoperfusion in the right MCA territory at the 4 second threshold.   - Continue on ASA and Brilinta    # Myoclonic jerks   - Hx of myoclonic jerks post CVAs   - EEG (1/18-2/6) negative for seizures  - Continue on Keppra and per neuro/ psych wishes to wean, family in agreement   - Currently down titrating Keppra 500 mg BID, now Keppra 250 mg BID 3/13 - )    # Depression/ Insomnia  - Continue on Lexapro per home medication   - Course complicated by agitation and pulling on tubes   - Home Seroquel discontinued as patient now on Valproic acid syrup   - S/p Ativan 0.5mg PO Q6H PRN and Haldol 0.25 mg Q6H for severe agitation, then s/p IM Zypexa 1.25 Q8H PRN for agitation    - Supportive care     # Agitation in setting of delirium / underlying vascular dementia   - C/w  mg TID, BH following   - monitor platelets, LFTs while on Depakote   - Recently, Pt had responded much better to Ativan than Zyprexa for agitation, however became obtunded, so Ativan d/c Ativan 0.5mg Q6H PRN  - 3/18 Start Seroquel 25 mg in AM and Seroquel 50 mg QHS   - 3/20 restless /agitated overnight Ativan x 2 no effect - DW psych can do Seroquel 12.5 PRN/ increase night Seroquel to 75mg - continues on 1:1 restless with  family  - 3/21-3/22 Calm, remains delirious no recent episodes of agitation -   - Became agitated with minimal response to Seroquel and required Ativan with improvement (overnight 3/22)    CARDIOLOGY   # Vasoplegic vs septic shock  # Hx of HTN   - Weaned off pressors in ICU and now with mild hypertension   - Continue on coreg and hydral as below   - Strict BP control given moderate AS  - Increased vascular congestion on CXR (3/9) so will give additional Lasix 20mg PO x1 (3/10)  - Diastolic bp low <50 hold Hydralazine and monitor bp - may need to decrease of dc     # AFIB RVR   # Bifascicular Block with Medtronic PPM   - AFIB RVR episodes and PPM interrogated (2/20) by EP with similar episodes  - Previous admission in 6/2022 with cardiogenic syncope second to bifasicular block, however now with PPM and AV myron blockers ok.   - Continue on lopressor 12.5mg BID however replaced with coreg second to HFrEF   - AC discussed at length however with recent GIB will hold for now     # HFrEF 38 likely stress induced?   # Mild to moderate AS   - ECHO (12/2023) with EF 62 with normal LVSF and mild to moderate AS   - ECHO (2/2024) with EF 38, moderate LVSD with global LV hypokinesis, mildly reduced RVSF (TAPSE 1.3), mild to moderate MR, mild AR, and moderate AS.   - RPT ECHO (3/4) with EF 45 and mild to moderate LVSD   - Continue on Lasix 20 QD, coreg 6.25 and hydralazine 50 (increased 3/4)   - Hold isordil and up titrate above medications first     # CAD with CABG   - CATH (5/2022) with dLAD 70, SVG graft to OM1 with 90 in stent stenosis s/p PCI and GEMMA placement, and LIMA graft to mLAD with no disease.   - Continue on ASA and Brilinta    # Prolonged QTC (Resolved)  - ECG (3/2) with QTC 616ms (corrected using bazzet 490ms)   - ECG (3/3) with QTC 634ms (corrected using bazzet 490ms)   - ECG (3/11) with QTC 490ms, EKG (3/15) QTc 498ms, EKG (3/21) 486ms  - Monitor QTc/ daily EKGs       RESPIRATORY   # Acute respiratory failure second to SARS-COV-2 vs pulm edema vs PNA  - Presented with COVID complicated by sepsis second to acute lynsey and worsening respiratory failure second to pulmonary edema requiring intubation.   - Prolonged intubation and s/p tracheostomy (2/13)   - CT CHEST 1/16 with new small bilateral pleural effusions/ atelectasis/ patchy bilateral ground-glass opacities are consistent with pulmonary edema.  - Completed ABX and COVID regimen as below   - Continue on vent and allow SBT as tolerated   - Continue on nebs and hold further HTS given intermittent hemoptysis  - Continue on diuresis as above    # Mild hemoptysis likely from suction trauma   - s/p bronch (2/18) with no active bleed  - Hemoptysis improved with TXA and holding further HTS as above   - Tiny hemoptysis second to suction trauma imporving  - Careful with suctioning   - Recurrent hemoptysis (3/9) so ordered for TXA nebs again however hemoptysis appeared self limited and stopped before nebs even given    HEENT   # L eye subconjunctival hemorrhage   - Continue on artifical tears and Lacrilube   - Optho eval appreciated     GI  # Nutrition/ Dysphagia   - PEG placed by GI on 2/20 and tube feeds continued.   - Loose stools and Banatrol continued     # UGIB   - EGD (1/2) with esophagitis and bleeding Dieulafoy's lesion s/p clips.   - Continue on PPI and carafate     # SMA calcification   - CTA demonstrating mesenteric fat stranding associated with ascending/transverse colon  - Diagnostic laparoscopy (12/24) with small bowel and visible colon viable, some inflammation of omentum in RUQ  - SMA Angiogram and stent placed (12/25).   - Continue on ASA and Brilinta     # Acute Cholecystitis   - CT (12/26) with distended GB with cholelithiasis and sludge   - HIDA (12/29) POSITIVE for acute cholecystitis   - Case discussed with IR at length and s/p PERC LYNSEY (1/26)   - Completed zosyn course (12/24-12/31)   - PERC LYNSEY tube to stay in until surgical candidate for cholecystectomy to prevent recurrence   - Noted with some bloody perc lynsey output so IR consulted, pending eval (3/23)    / RENAL   # CKD   - CRE at baseline   - Monitor renal function and UOP   - Aranesp SQ x1 (3/8)    # Hypernatremia (resolved)   - s/p  Q4H  - Monitor closely with diuresis as above   - Stop FWF (3/15) given c/f worsening vol overload     INFECTIOUS DISEASE   # COVID with superimposed PNA   # ESBL Kleb PNA   - COVID POSITIVE (12/23) and completed remdesivir x 1 dose and paxlovid course.   - WOB worsened as above requiring intubation and cultures negative. Zosyn completed empirically however hypothermic (2/11) and BCx, UA, RVP and BAL negative.   - Completed additional zosyn (2/12-2/19) empirically   - Fever spike and thick secretions and SCX (2/26) with ESBL klebs.   - s/p Zosyn (2/27 - 2/29) but resistant and completed Erta (2/29 - 3/6)     HEME  # AOCD   - Monitor HH   - DVT PPX with HSQ     ENDOCRINE   # DM2 A1C 9.3   - Continue on Lantus 14 and ISS     SKIN/ TUBES   - Trach (2/13)   - PEG (2/20)   - PERC LYNSEY (1/26 - )     ETHICS   - FULL CODE     DISPO - vent facility and family aware. SW with accepting facility however pending available bed.  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 Course ultimately complicated by progressive WOB and O2 demand and patient intubated and transferred to MICU (1/22). Course further complicated by acute cholecystitis and s/p Perc Lynsey with IR on (1/26), HFrEF 38, pulmonary edema, superimposed PNA, failed extubation and prolonged vent time and s/p trach (2/13). Patient transferred to RCU (2/16) and s/p PEG (2/20). Course complicated by volume overload and diuresis and GDMT continued and HFrEF 45 with improvement. Course further complicated by agitation from underlying vascular dementia/ hospital delirium.     NEUROLOGY   # Encephalopathy   - Hx of CVA with no residual deficits per family, however per family worsening confusion at home over the past 6 months (becoming disoriented to time) but prior to admission has been more confused, talking about seeing people that are not present.  - CTH (1/31) with no evidence of acute infarct  - Continue on ASA and Brilinta  - Holding Neurontin, Memantine and Oxycodone in setting of somnolence  - 3/17: obtunded overnight, CT Head: Mild chronic microvascular changes without evidence of an acute transcortical infarction or hemorrhage.  - VBG and Ammonia level grossly unremarkable    # ICA stenosis   - CTA NECK (1/31) with moderate to severe narrowing left internal carotid at the origin. Severe narrowing right internal carotid by a tandem lesion 1.5 cm above the bifurcation with a narrowed internal carotid beyond the lesion. Extensive calcified plaque both cavernous and supraclinoid carotid arteries with narrowing but no occlusion. Mild narrowing proximal right M1 segment. Moderate narrowing both vertebral V4 segments. No perfusion abnormality at the Tmax 6 second threshold, but moderate hypoperfusion in the right MCA territory at the 4 second threshold.   - Continue on ASA and Brilinta    # Myoclonic jerks   - Hx of myoclonic jerks post CVAs   - EEG (1/18-2/6) negative for seizures  - Continue on Keppra and per neuro/ psych wishes to wean, family in agreement   - Currently down titrating Keppra 500 mg BID, now Keppra 250 mg BID 3/13 - )    # Depression/ Insomnia  - Continue on Lexapro per home medication   - Course complicated by agitation and pulling on tubes   - Home Seroquel discontinued as patient now on Valproic acid syrup   - S/p Ativan 0.5mg PO Q6H PRN and Haldol 0.25 mg Q6H for severe agitation, then s/p IM Zypexa 1.25 Q8H PRN for agitation    - Supportive care     # Agitation in setting of delirium / underlying vascular dementia   - C/w  mg TID, BH following   - monitor platelets, LFTs while on Depakote   - Recently, Pt had responded much better to Ativan than Zyprexa for agitation, however became obtunded, so Ativan d/c Ativan 0.5mg Q6H PRN  - 3/18 Start Seroquel 25 mg in AM and Seroquel 50 mg QHS   - 3/20 restless /agitated overnight Ativan x 2 no effect - DW psych can do Seroquel 12.5 PRN/ increase night Seroquel to 75mg - continues on 1:1 restless with  family  - 3/21-3/22 Calm, remains delirious no recent episodes of agitation -   - Became agitated with minimal response to Seroquel and required Ativan with improvement (overnight 3/22)    CARDIOLOGY   # Vasoplegic vs septic shock  # Hx of HTN   - Weaned off pressors in ICU and now with mild hypertension   - Continue on coreg and hydral as below   - Strict BP control given moderate AS  - Increased vascular congestion on CXR (3/9) so will give additional Lasix 20mg PO x1 (3/10)  - Diastolic bp low <50 hold Hydralazine and monitor bp - may need to decrease of dc   - Diastolic hypotension noted so will hold Hydralazine (3/24)    # AFIB RVR   # Bifascicular Block with Medtronic PPM   - AFIB RVR episodes and PPM interrogated (2/20) by EP with similar episodes  - Previous admission in 6/2022 with cardiogenic syncope second to bifasicular block, however now with PPM and AV myron blockers ok.   - Continue on lopressor 12.5mg BID however replaced with coreg second to HFrEF   - AC discussed at length however with recent GIB will hold for now     # HFrEF 38 likely stress induced?   # Mild to moderate AS   - ECHO (12/2023) with EF 62 with normal LVSF and mild to moderate AS   - ECHO (2/2024) with EF 38, moderate LVSD with global LV hypokinesis, mildly reduced RVSF (TAPSE 1.3), mild to moderate MR, mild AR, and moderate AS.   - RPT ECHO (3/4) with EF 45 and mild to moderate LVSD   - Continue on Lasix 20 QD, coreg 6.25 and hydralazine 50 (increased 3/4)   - Hold isordil and up titrate above medications first   - Hold Hydralazine given diastolic hypotension (3/24)    # CAD with CABG   - CATH (5/2022) with dLAD 70, SVG graft to OM1 with 90 in stent stenosis s/p PCI and GEMMA placement, and LIMA graft to mLAD with no disease.   - Continue on ASA and Brilinta    # Prolonged QTC (Resolved)  - ECG (3/2) with QTC 616ms (corrected using bazzet 490ms)   - ECG (3/3) with QTC 634ms (corrected using bazzet 490ms)   - ECG (3/11) with QTC 490ms, EKG (3/15) QTc 498ms, EKG (3/21) 486ms  - Monitor QTc/ daily EKGs       RESPIRATORY   # Acute respiratory failure second to SARS-COV-2 vs pulm edema vs PNA  - Presented with COVID complicated by sepsis second to acute lynsey and worsening respiratory failure second to pulmonary edema requiring intubation.   - Prolonged intubation and s/p tracheostomy (2/13)   - CT CHEST 1/16 with new small bilateral pleural effusions/ atelectasis/ patchy bilateral ground-glass opacities are consistent with pulmonary edema.  - Completed ABX and COVID regimen as below   - Continue on vent and allow SBT as tolerated   - Continue on nebs and hold further HTS given intermittent hemoptysis  - Continue on diuresis as above    # Mild hemoptysis likely from suction trauma   - s/p bronch (2/18) with no active bleed  - Hemoptysis improved with TXA and holding further HTS as above   - Tiny hemoptysis second to suction trauma imporving  - Careful with suctioning   - Recurrent hemoptysis (3/9) so ordered for TXA nebs again however hemoptysis appeared self limited and stopped before nebs even given    HEENT   # L eye subconjunctival hemorrhage   - Continue on artifical tears and Lacrilube   - Optho eval appreciated     GI  # Nutrition/ Dysphagia   - PEG placed by GI on 2/20 and tube feeds continued.   - Loose stools and Banatrol continued     # UGIB   - EGD (1/2) with esophagitis and bleeding Dieulafoy's lesion s/p clips.   - Continue on PPI and carafate     # SMA calcification   - CTA demonstrating mesenteric fat stranding associated with ascending/transverse colon  - Diagnostic laparoscopy (12/24) with small bowel and visible colon viable, some inflammation of omentum in RUQ  - SMA Angiogram and stent placed (12/25).   - Continue on ASA and Brilinta     # Acute Cholecystitis   - CT (12/26) with distended GB with cholelithiasis and sludge   - HIDA (12/29) POSITIVE for acute cholecystitis   - Case discussed with IR at length and s/p PERC LYNSEY (1/26)   - Completed zosyn course (12/24-12/31)   - PERC LYNSEY tube to stay in until surgical candidate for cholecystectomy to prevent recurrence   - Noted with some bloody perc lynsey output so IR consulted, recommending CT A/P to assess if still in proper position    / RENAL   # CKD   - CRE at baseline   - Monitor renal function and UOP   - Aranesp SQ x1 (3/8)    # Hypernatremia (resolved)   - s/p  Q4H  - Monitor closely with diuresis as above   - Stop FWF (3/15) given c/f worsening vol overload     INFECTIOUS DISEASE   # COVID with superimposed PNA   # ESBL Kleb PNA   - COVID POSITIVE (12/23) and completed remdesivir x 1 dose and paxlovid course.   - WOB worsened as above requiring intubation and cultures negative. Zosyn completed empirically however hypothermic (2/11) and BCx, UA, RVP and BAL negative.   - Completed additional zosyn (2/12-2/19) empirically   - Fever spike and thick secretions and SCX (2/26) with ESBL klebs.   - s/p Zosyn (2/27 - 2/29) but resistant and completed Erta (2/29 - 3/6)     HEME  # AOCD   - Monitor HH   - DVT PPX with HSQ     ENDOCRINE   # DM2 A1C 9.3   - Continue on Lantus 14 and ISS     SKIN/ TUBES   - Trach (2/13)   - PEG (2/20)   - PERC LYNSEY (1/26 - )     ETHICS   - FULL CODE     DISPO - vent facility and family aware. SW with accepting facility however pending available bed.

## 2024-03-24 NOTE — PROGRESS NOTE ADULT - SUBJECTIVE AND OBJECTIVE BOX
CHIEF COMPLAINT:Patient is a 89y old  Male who presents with a chief complaint of COVID19, Chest pain (23 Mar 2024 07:29)      INTERVAL EVENTS:     ROS: Seen by bedside during AM rounds     OBJECTIVE:  ICU Vital Signs Last 24 Hrs  T(C): 36.4 (24 Mar 2024 04:00), Max: 36.6 (23 Mar 2024 08:00)  T(F): 97.6 (24 Mar 2024 04:00), Max: 97.8 (23 Mar 2024 08:00)  HR: 93 (24 Mar 2024 04:00) (79 - 99)  BP: 144/98 (24 Mar 2024 04:00) (105/35 - 144/98)  BP(mean): --  ABP: --  ABP(mean): --  RR: 20 (23 Mar 2024 20:00) (18 - 20)  SpO2: 100% (24 Mar 2024 04:00) (96% - 100%)    O2 Parameters below as of 23 Mar 2024 19:28  Patient On (Oxygen Delivery Method): ventilator          Mode: AC/ CMV (Assist Control/ Continuous Mandatory Ventilation), RR (machine): 16, TV (machine): 400, FiO2: 30, PEEP: 5, ITime: 0.84, MAP: 13, PIP: 39    03-22 @ 07:01  -  03-23 @ 07:00  --------------------------------------------------------  IN: 1340 mL / OUT: 960 mL / NET: 380 mL    03-23 @ 07:01  -  03-24 @ 06:57  --------------------------------------------------------  IN: 1300 mL / OUT: 1231 mL / NET: 69 mL      CAPILLARY BLOOD GLUCOSE      POCT Blood Glucose.: 161 mg/dL (24 Mar 2024 05:49)      PHYSICAL EXAM:  General:   HEENT:   Lymph Nodes:  Neck:   Respiratory:   Cardiovascular:   Abdomen:   Extremities:   Skin:   Neurological:  Psychiatry:    Mode: AC/ CMV (Assist Control/ Continuous Mandatory Ventilation)  RR (machine): 16  TV (machine): 400  FiO2: 30  PEEP: 5  ITime: 0.84  MAP: 13  PIP: 39      HOSPITAL MEDICATIONS:  MEDICATIONS  (STANDING):  albuterol/ipratropium for Nebulization 3 milliLiter(s) Nebulizer every 12 hours  artificial  tears Solution 1 Drop(s) Left EYE four times a day  aspirin  chewable 81 milliGRAM(s) Enteral Tube daily  carvedilol 6.25 milliGRAM(s) Oral every 12 hours  chlorhexidine 0.12% Liquid 15 milliLiter(s) Oral Mucosa every 12 hours  chlorhexidine 2% Cloths 1 Application(s) Topical daily  dextrose 5%. 1000 milliLiter(s) (100 mL/Hr) IV Continuous <Continuous>  dextrose 5%. 1000 milliLiter(s) (50 mL/Hr) IV Continuous <Continuous>  dextrose 50% Injectable 25 Gram(s) IV Push once  dextrose 50% Injectable 12.5 Gram(s) IV Push once  dextrose 50% Injectable 25 Gram(s) IV Push once  doxazosin 2 milliGRAM(s) Oral at bedtime  escitalopram Solution 10 milliGRAM(s) Oral daily  furosemide    Tablet 20 milliGRAM(s) Oral daily  glucagon  Injectable 1 milliGRAM(s) IntraMuscular once  heparin   Injectable 5000 Unit(s) SubCutaneous every 8 hours  hydrALAZINE 25 milliGRAM(s) Oral every 8 hours  insulin glargine Injectable (LANTUS) 14 Unit(s) SubCutaneous <User Schedule>  insulin lispro (ADMELOG) corrective regimen sliding scale   SubCutaneous every 6 hours  levETIRAcetam  Solution 250 milliGRAM(s) Oral two times a day  melatonin 6 milliGRAM(s) Oral at bedtime  pantoprazole   Suspension 40 milliGRAM(s) Oral every 12 hours  petrolatum Ophthalmic Ointment 1 Application(s) Left EYE at bedtime  polyethylene glycol 3350 17 Gram(s) Oral daily  QUEtiapine 75 milliGRAM(s) Oral at bedtime  QUEtiapine 25 milliGRAM(s) Oral <User Schedule>  senna Syrup 10 milliLiter(s) Oral at bedtime  sucralfate suspension 1 Gram(s) Enteral Tube two times a day  ticagrelor 60 milliGRAM(s) Oral every 12 hours  valproic  acid Syrup 125 milliGRAM(s) Oral every 8 hours    MEDICATIONS  (PRN):  acetaminophen   Oral Liquid .. 650 milliGRAM(s) Oral every 6 hours PRN Temp greater or equal to 38C (100.4F), Mild Pain (1 - 3), Moderate Pain (4 - 6)  dextrose Oral Gel 15 Gram(s) Oral once PRN Blood Glucose LESS THAN 70 milliGRAM(s)/deciliter  QUEtiapine 12.5 milliGRAM(s) Oral every 6 hours PRN agitation      LABS:                        8.5    6.89  )-----------( 322      ( 23 Mar 2024 05:45 )             27.1     03-23    141  |  101  |  42<H>  ----------------------------<  144<H>  4.2   |  29  |  1.26    Ca    8.7      23 Mar 2024 05:45  Phos  3.5     03-23  Mg     2.30     03-23    TPro  7.1  /  Alb  3.0<L>  /  TBili  0.2  /  DBili  <0.2  /  AST  18  /  ALT  12  /  AlkPhos  118  03-23      Urinalysis Basic - ( 23 Mar 2024 05:45 )    Color: x / Appearance: x / SG: x / pH: x  Gluc: 144 mg/dL / Ketone: x  / Bili: x / Urobili: x   Blood: x / Protein: x / Nitrite: x   Leuk Esterase: x / RBC: x / WBC x   Sq Epi: x / Non Sq Epi: x / Bacteria: x         CHIEF COMPLAINT:Patient is a 89y old  Male who presents with a chief complaint of COVID19, Chest pain (23 Mar 2024 07:29)      INTERVAL EVENTS:     ROS: Seen by bedside during AM rounds     OBJECTIVE:  ICU Vital Signs Last 24 Hrs  T(C): 36.4 (24 Mar 2024 04:00), Max: 36.6 (23 Mar 2024 08:00)  T(F): 97.6 (24 Mar 2024 04:00), Max: 97.8 (23 Mar 2024 08:00)  HR: 93 (24 Mar 2024 04:00) (79 - 99)  BP: 144/98 (24 Mar 2024 04:00) (105/35 - 144/98)  BP(mean): --  ABP: --  ABP(mean): --  RR: 20 (23 Mar 2024 20:00) (18 - 20)  SpO2: 100% (24 Mar 2024 04:00) (96% - 100%)    O2 Parameters below as of 23 Mar 2024 19:28  Patient On (Oxygen Delivery Method): ventilator          Mode: AC/ CMV (Assist Control/ Continuous Mandatory Ventilation), RR (machine): 16, TV (machine): 400, FiO2: 30, PEEP: 5, ITime: 0.84, MAP: 13, PIP: 39    03-22 @ 07:01  -  03-23 @ 07:00  --------------------------------------------------------  IN: 1340 mL / OUT: 960 mL / NET: 380 mL    03-23 @ 07:01  -  03-24 @ 06:57  --------------------------------------------------------  IN: 1300 mL / OUT: 1231 mL / NET: 69 mL      CAPILLARY BLOOD GLUCOSE      POCT Blood Glucose.: 161 mg/dL (24 Mar 2024 05:49)      PHYSICAL EXAM:  General: NAD   Neck: (+) Trach tube noted, site c/d/i.  Cards: S1/S2, no murmurs   Pulm: CTA b/l with some mild end inspiratory crackles L-hemithorax. No resp distress. CPAP'ing at the time.   Abdomen: Soft, NTND. (+) PEG noted, site c/d/i. (+)Biliary drain in anterior abdomen draining dark brownish fluid, with no blood noted in collection bag.   Extremities: B/l intact radial pulses. No pedal edema. Extremities warm to touch.   Neurology: Sleeping but roues to verbal/tactile stimuli. Attempts to phonate but poor phonation d/t trach. Not following commands.   Skin: warm to touch, color appropriate for ethnicity. Refer to RN assessment for further details.      Mode: AC/ CMV (Assist Control/ Continuous Mandatory Ventilation)  RR (machine): 16  TV (machine): 400  FiO2: 30  PEEP: 5  ITime: 0.84  MAP: 13  PIP: 39      HOSPITAL MEDICATIONS:  MEDICATIONS  (STANDING):  albuterol/ipratropium for Nebulization 3 milliLiter(s) Nebulizer every 12 hours  artificial  tears Solution 1 Drop(s) Left EYE four times a day  aspirin  chewable 81 milliGRAM(s) Enteral Tube daily  carvedilol 6.25 milliGRAM(s) Oral every 12 hours  chlorhexidine 0.12% Liquid 15 milliLiter(s) Oral Mucosa every 12 hours  chlorhexidine 2% Cloths 1 Application(s) Topical daily  dextrose 5%. 1000 milliLiter(s) (100 mL/Hr) IV Continuous <Continuous>  dextrose 5%. 1000 milliLiter(s) (50 mL/Hr) IV Continuous <Continuous>  dextrose 50% Injectable 25 Gram(s) IV Push once  dextrose 50% Injectable 12.5 Gram(s) IV Push once  dextrose 50% Injectable 25 Gram(s) IV Push once  doxazosin 2 milliGRAM(s) Oral at bedtime  escitalopram Solution 10 milliGRAM(s) Oral daily  furosemide    Tablet 20 milliGRAM(s) Oral daily  glucagon  Injectable 1 milliGRAM(s) IntraMuscular once  heparin   Injectable 5000 Unit(s) SubCutaneous every 8 hours  hydrALAZINE 25 milliGRAM(s) Oral every 8 hours  insulin glargine Injectable (LANTUS) 14 Unit(s) SubCutaneous <User Schedule>  insulin lispro (ADMELOG) corrective regimen sliding scale   SubCutaneous every 6 hours  levETIRAcetam  Solution 250 milliGRAM(s) Oral two times a day  melatonin 6 milliGRAM(s) Oral at bedtime  pantoprazole   Suspension 40 milliGRAM(s) Oral every 12 hours  petrolatum Ophthalmic Ointment 1 Application(s) Left EYE at bedtime  polyethylene glycol 3350 17 Gram(s) Oral daily  QUEtiapine 75 milliGRAM(s) Oral at bedtime  QUEtiapine 25 milliGRAM(s) Oral <User Schedule>  senna Syrup 10 milliLiter(s) Oral at bedtime  sucralfate suspension 1 Gram(s) Enteral Tube two times a day  ticagrelor 60 milliGRAM(s) Oral every 12 hours  valproic  acid Syrup 125 milliGRAM(s) Oral every 8 hours    MEDICATIONS  (PRN):  acetaminophen   Oral Liquid .. 650 milliGRAM(s) Oral every 6 hours PRN Temp greater or equal to 38C (100.4F), Mild Pain (1 - 3), Moderate Pain (4 - 6)  dextrose Oral Gel 15 Gram(s) Oral once PRN Blood Glucose LESS THAN 70 milliGRAM(s)/deciliter  QUEtiapine 12.5 milliGRAM(s) Oral every 6 hours PRN agitation      LABS:                        8.5    6.89  )-----------( 322      ( 23 Mar 2024 05:45 )             27.1     03-23    141  |  101  |  42<H>  ----------------------------<  144<H>  4.2   |  29  |  1.26    Ca    8.7      23 Mar 2024 05:45  Phos  3.5     03-23  Mg     2.30     03-23    TPro  7.1  /  Alb  3.0<L>  /  TBili  0.2  /  DBili  <0.2  /  AST  18  /  ALT  12  /  AlkPhos  118  03-23      Urinalysis Basic - ( 23 Mar 2024 05:45 )    Color: x / Appearance: x / SG: x / pH: x  Gluc: 144 mg/dL / Ketone: x  / Bili: x / Urobili: x   Blood: x / Protein: x / Nitrite: x   Leuk Esterase: x / RBC: x / WBC x   Sq Epi: x / Non Sq Epi: x / Bacteria: x         CHIEF COMPLAINT:Patient is a 89y old  Male who presents with a chief complaint of COVID19, Chest pain (23 Mar 2024 07:29)      INTERVAL EVENTS: no overnight events. dBPs trending low so Hydralazine will be stopped. Awaiting CT A/P    ROS: Seen by bedside during AM rounds     OBJECTIVE:  ICU Vital Signs Last 24 Hrs  T(C): 36.4 (24 Mar 2024 04:00), Max: 36.6 (23 Mar 2024 08:00)  T(F): 97.6 (24 Mar 2024 04:00), Max: 97.8 (23 Mar 2024 08:00)  HR: 93 (24 Mar 2024 04:00) (79 - 99)  BP: 144/98 (24 Mar 2024 04:00) (105/35 - 144/98)  BP(mean): --  ABP: --  ABP(mean): --  RR: 20 (23 Mar 2024 20:00) (18 - 20)  SpO2: 100% (24 Mar 2024 04:00) (96% - 100%)    O2 Parameters below as of 23 Mar 2024 19:28  Patient On (Oxygen Delivery Method): ventilator          Mode: AC/ CMV (Assist Control/ Continuous Mandatory Ventilation), RR (machine): 16, TV (machine): 400, FiO2: 30, PEEP: 5, ITime: 0.84, MAP: 13, PIP: 39    03-22 @ 07:01  -  03-23 @ 07:00  --------------------------------------------------------  IN: 1340 mL / OUT: 960 mL / NET: 380 mL    03-23 @ 07:01  -  03-24 @ 06:57  --------------------------------------------------------  IN: 1300 mL / OUT: 1231 mL / NET: 69 mL      CAPILLARY BLOOD GLUCOSE      POCT Blood Glucose.: 161 mg/dL (24 Mar 2024 05:49)      PHYSICAL EXAM:  General: NAD   Neck: (+) Trach tube noted, site c/d/i.  Cards: S1/S2, no murmurs   Pulm: CTA b/l with some mild end inspiratory crackles L-hemithorax. No resp distress. CPAP'ing at the time.   Abdomen: Soft, NTND. (+) PEG noted, site c/d/i. (+)Biliary drain in anterior abdomen draining dark brownish fluid, with no blood noted in collection bag.   Extremities: B/l intact radial pulses. No pedal edema. Extremities warm to touch.   Neurology: Sleeping but roues to verbal/tactile stimuli. Attempts to phonate but poor phonation d/t trach. Not following commands.   Skin: warm to touch, color appropriate for ethnicity. Refer to RN assessment for further details.      Mode: AC/ CMV (Assist Control/ Continuous Mandatory Ventilation)  RR (machine): 16  TV (machine): 400  FiO2: 30  PEEP: 5  ITime: 0.84  MAP: 13  PIP: 39      HOSPITAL MEDICATIONS:  MEDICATIONS  (STANDING):  albuterol/ipratropium for Nebulization 3 milliLiter(s) Nebulizer every 12 hours  artificial  tears Solution 1 Drop(s) Left EYE four times a day  aspirin  chewable 81 milliGRAM(s) Enteral Tube daily  carvedilol 6.25 milliGRAM(s) Oral every 12 hours  chlorhexidine 0.12% Liquid 15 milliLiter(s) Oral Mucosa every 12 hours  chlorhexidine 2% Cloths 1 Application(s) Topical daily  dextrose 5%. 1000 milliLiter(s) (100 mL/Hr) IV Continuous <Continuous>  dextrose 5%. 1000 milliLiter(s) (50 mL/Hr) IV Continuous <Continuous>  dextrose 50% Injectable 25 Gram(s) IV Push once  dextrose 50% Injectable 12.5 Gram(s) IV Push once  dextrose 50% Injectable 25 Gram(s) IV Push once  doxazosin 2 milliGRAM(s) Oral at bedtime  escitalopram Solution 10 milliGRAM(s) Oral daily  furosemide    Tablet 20 milliGRAM(s) Oral daily  glucagon  Injectable 1 milliGRAM(s) IntraMuscular once  heparin   Injectable 5000 Unit(s) SubCutaneous every 8 hours  hydrALAZINE 25 milliGRAM(s) Oral every 8 hours  insulin glargine Injectable (LANTUS) 14 Unit(s) SubCutaneous <User Schedule>  insulin lispro (ADMELOG) corrective regimen sliding scale   SubCutaneous every 6 hours  levETIRAcetam  Solution 250 milliGRAM(s) Oral two times a day  melatonin 6 milliGRAM(s) Oral at bedtime  pantoprazole   Suspension 40 milliGRAM(s) Oral every 12 hours  petrolatum Ophthalmic Ointment 1 Application(s) Left EYE at bedtime  polyethylene glycol 3350 17 Gram(s) Oral daily  QUEtiapine 75 milliGRAM(s) Oral at bedtime  QUEtiapine 25 milliGRAM(s) Oral <User Schedule>  senna Syrup 10 milliLiter(s) Oral at bedtime  sucralfate suspension 1 Gram(s) Enteral Tube two times a day  ticagrelor 60 milliGRAM(s) Oral every 12 hours  valproic  acid Syrup 125 milliGRAM(s) Oral every 8 hours    MEDICATIONS  (PRN):  acetaminophen   Oral Liquid .. 650 milliGRAM(s) Oral every 6 hours PRN Temp greater or equal to 38C (100.4F), Mild Pain (1 - 3), Moderate Pain (4 - 6)  dextrose Oral Gel 15 Gram(s) Oral once PRN Blood Glucose LESS THAN 70 milliGRAM(s)/deciliter  QUEtiapine 12.5 milliGRAM(s) Oral every 6 hours PRN agitation      LABS:                        8.5    6.89  )-----------( 322      ( 23 Mar 2024 05:45 )             27.1     03-23    141  |  101  |  42<H>  ----------------------------<  144<H>  4.2   |  29  |  1.26    Ca    8.7      23 Mar 2024 05:45  Phos  3.5     03-23  Mg     2.30     03-23    TPro  7.1  /  Alb  3.0<L>  /  TBili  0.2  /  DBili  <0.2  /  AST  18  /  ALT  12  /  AlkPhos  118  03-23      Urinalysis Basic - ( 23 Mar 2024 05:45 )    Color: x / Appearance: x / SG: x / pH: x  Gluc: 144 mg/dL / Ketone: x  / Bili: x / Urobili: x   Blood: x / Protein: x / Nitrite: x   Leuk Esterase: x / RBC: x / WBC x   Sq Epi: x / Non Sq Epi: x / Bacteria: x

## 2024-03-24 NOTE — PROGRESS NOTE ADULT - TIME BILLING
Personal review of data, imaging and discussion with medical team.
Review of patient records, including history, laboratory data, and imaging. Patient evaluation and assessment. Coordination of care. Time excludes bedside teaching or procedures.
Review of patient records, including history, laboratory data, and imaging. Patient evaluation and assessment. Coordination of care. Time excludes bedside teaching or procedures.
Reviewing the EMR, vitals, imaging, medication list, recent labs, prior records and coordinating care with medical providers. This time excludes procedures and teaching.
Review of patient records, including history, laboratory data, and imaging. Patient evaluation and assessment. Coordination of care. Time excludes bedside teaching or procedures.
Reviewing the EMR, vitals, imaging, medication list, recent labs, prior records and coordinating care with medical providers. This time excludes procedures and teaching.
Reviewing the EMR, vitals, imaging, medication list, recent labs, prior records and coordinating care with medical providers. This time excludes procedures and teaching.
review of the medical record, interpretation of labs, imaging studies, discussion with interdisciplinary team, physical exam and medical decision making as above
Reviewing the EMR, vitals, imaging, medication list, recent labs, prior records and coordinating care with medical providers. This time excludes procedures and teaching.
Reviewing the EMR, vitals, imaging, medication list, recent labs, prior records and coordinating care with medical providers. This time excludes procedures and teaching.
Personal review of data, imaging and discussion with medical team.
Review of patient records, including history, laboratory data, and imaging. Patient evaluation and assessment. Coordination of care. Time excludes bedside teaching or procedures.
Reviewing the EMR, vitals, imaging, medication list, recent labs, prior records and coordinating care with medical providers. This time excludes procedures and teaching.
review of the medical record, interpretation of labs, imaging studies, discussion with interdisciplinary team, physical exam and medical decision making as above
Personal review of data, imaging and discussion with medical team.
review of the medical record, interpretation of labs, imaging studies, discussion with interdisciplinary team, physical exam and medical decision making as above
Review of patient records, including history, laboratory data, and imaging. Patient evaluation and assessment. Coordination of care. Time excludes bedside teaching or procedures.

## 2024-03-25 LAB
ALBUMIN SERPL ELPH-MCNC: 2.9 G/DL — LOW (ref 3.3–5)
ALP SERPL-CCNC: 108 U/L — SIGNIFICANT CHANGE UP (ref 40–120)
ALT FLD-CCNC: 11 U/L — SIGNIFICANT CHANGE UP (ref 4–41)
AMMONIA BLD-MCNC: 41 UMOL/L — SIGNIFICANT CHANGE UP (ref 11–55)
ANION GAP SERPL CALC-SCNC: 11 MMOL/L — SIGNIFICANT CHANGE UP (ref 7–14)
AST SERPL-CCNC: 18 U/L — SIGNIFICANT CHANGE UP (ref 4–40)
BASE EXCESS BLDV CALC-SCNC: 7.1 MMOL/L — HIGH (ref -2–3)
BASOPHILS # BLD AUTO: 0.04 K/UL — SIGNIFICANT CHANGE UP (ref 0–0.2)
BASOPHILS NFR BLD AUTO: 0.6 % — SIGNIFICANT CHANGE UP (ref 0–2)
BILIRUB DIRECT SERPL-MCNC: <0.2 MG/DL — SIGNIFICANT CHANGE UP (ref 0–0.3)
BILIRUB INDIRECT FLD-MCNC: >0 MG/DL — SIGNIFICANT CHANGE UP (ref 0–1)
BILIRUB SERPL-MCNC: 0.2 MG/DL — SIGNIFICANT CHANGE UP (ref 0.2–1.2)
BUN SERPL-MCNC: 46 MG/DL — HIGH (ref 7–23)
CALCIUM SERPL-MCNC: 9 MG/DL — SIGNIFICANT CHANGE UP (ref 8.4–10.5)
CHLORIDE SERPL-SCNC: 100 MMOL/L — SIGNIFICANT CHANGE UP (ref 98–107)
CO2 BLDV-SCNC: 33.4 MMOL/L — HIGH (ref 22–26)
CO2 SERPL-SCNC: 30 MMOL/L — SIGNIFICANT CHANGE UP (ref 22–31)
CREAT SERPL-MCNC: 1.34 MG/DL — HIGH (ref 0.5–1.3)
EGFR: 50 ML/MIN/1.73M2 — LOW
EOSINOPHIL # BLD AUTO: 0.54 K/UL — HIGH (ref 0–0.5)
EOSINOPHIL NFR BLD AUTO: 8.6 % — HIGH (ref 0–6)
GLUCOSE BLDC GLUCOMTR-MCNC: 136 MG/DL — HIGH (ref 70–99)
GLUCOSE BLDC GLUCOMTR-MCNC: 151 MG/DL — HIGH (ref 70–99)
GLUCOSE BLDC GLUCOMTR-MCNC: 160 MG/DL — HIGH (ref 70–99)
GLUCOSE SERPL-MCNC: 122 MG/DL — HIGH (ref 70–99)
HCO3 BLDV-SCNC: 32 MMOL/L — HIGH (ref 22–29)
HCT VFR BLD CALC: 27.3 % — LOW (ref 39–50)
HGB BLD-MCNC: 8.8 G/DL — LOW (ref 13–17)
IANC: 2.67 K/UL — SIGNIFICANT CHANGE UP (ref 1.8–7.4)
IMM GRANULOCYTES NFR BLD AUTO: 0.3 % — SIGNIFICANT CHANGE UP (ref 0–0.9)
LYMPHOCYTES # BLD AUTO: 2.32 K/UL — SIGNIFICANT CHANGE UP (ref 1–3.3)
LYMPHOCYTES # BLD AUTO: 36.8 % — SIGNIFICANT CHANGE UP (ref 13–44)
MAGNESIUM SERPL-MCNC: 2.4 MG/DL — SIGNIFICANT CHANGE UP (ref 1.6–2.6)
MCHC RBC-ENTMCNC: 29.1 PG — SIGNIFICANT CHANGE UP (ref 27–34)
MCHC RBC-ENTMCNC: 32.2 GM/DL — SIGNIFICANT CHANGE UP (ref 32–36)
MCV RBC AUTO: 90.4 FL — SIGNIFICANT CHANGE UP (ref 80–100)
MONOCYTES # BLD AUTO: 0.72 K/UL — SIGNIFICANT CHANGE UP (ref 0–0.9)
MONOCYTES NFR BLD AUTO: 11.4 % — SIGNIFICANT CHANGE UP (ref 2–14)
NEUTROPHILS # BLD AUTO: 2.67 K/UL — SIGNIFICANT CHANGE UP (ref 1.8–7.4)
NEUTROPHILS NFR BLD AUTO: 42.3 % — LOW (ref 43–77)
NRBC # BLD: 0 /100 WBCS — SIGNIFICANT CHANGE UP (ref 0–0)
NRBC # FLD: 0 K/UL — SIGNIFICANT CHANGE UP (ref 0–0)
PCO2 BLDV: 45 MMHG — SIGNIFICANT CHANGE UP (ref 42–55)
PH BLDV: 7.46 — HIGH (ref 7.32–7.43)
PHOSPHATE SERPL-MCNC: 3.6 MG/DL — SIGNIFICANT CHANGE UP (ref 2.5–4.5)
PLATELET # BLD AUTO: 391 K/UL — SIGNIFICANT CHANGE UP (ref 150–400)
PO2 BLDV: 43 MMHG — SIGNIFICANT CHANGE UP (ref 25–45)
POTASSIUM SERPL-MCNC: 4.3 MMOL/L — SIGNIFICANT CHANGE UP (ref 3.5–5.3)
POTASSIUM SERPL-SCNC: 4.3 MMOL/L — SIGNIFICANT CHANGE UP (ref 3.5–5.3)
PROT SERPL-MCNC: 7 G/DL — SIGNIFICANT CHANGE UP (ref 6–8.3)
RBC # BLD: 3.02 M/UL — LOW (ref 4.2–5.8)
RBC # FLD: 15.8 % — HIGH (ref 10.3–14.5)
SAO2 % BLDV: 69.7 % — SIGNIFICANT CHANGE UP (ref 67–88)
SODIUM SERPL-SCNC: 141 MMOL/L — SIGNIFICANT CHANGE UP (ref 135–145)
WBC # BLD: 6.31 K/UL — SIGNIFICANT CHANGE UP (ref 3.8–10.5)
WBC # FLD AUTO: 6.31 K/UL — SIGNIFICANT CHANGE UP (ref 3.8–10.5)

## 2024-03-25 PROCEDURE — 99233 SBSQ HOSP IP/OBS HIGH 50: CPT | Mod: FS

## 2024-03-25 PROCEDURE — 99232 SBSQ HOSP IP/OBS MODERATE 35: CPT

## 2024-03-25 PROCEDURE — 93010 ELECTROCARDIOGRAM REPORT: CPT | Mod: 76

## 2024-03-25 RX ORDER — QUETIAPINE FUMARATE 200 MG/1
50 TABLET, FILM COATED ORAL
Refills: 0 | Status: DISCONTINUED | OUTPATIENT
Start: 2024-03-26 | End: 2024-03-27

## 2024-03-25 RX ORDER — QUETIAPINE FUMARATE 200 MG/1
25 TABLET, FILM COATED ORAL ONCE
Refills: 0 | Status: COMPLETED | OUTPATIENT
Start: 2024-03-25 | End: 2024-03-25

## 2024-03-25 RX ADMIN — Medication 1 DROP(S): at 00:19

## 2024-03-25 RX ADMIN — Medication 81 MILLIGRAM(S): at 11:11

## 2024-03-25 RX ADMIN — Medication 125 MILLIGRAM(S): at 22:15

## 2024-03-25 RX ADMIN — QUETIAPINE FUMARATE 75 MILLIGRAM(S): 200 TABLET, FILM COATED ORAL at 22:16

## 2024-03-25 RX ADMIN — INSULIN GLARGINE 14 UNIT(S): 100 INJECTION, SOLUTION SUBCUTANEOUS at 12:25

## 2024-03-25 RX ADMIN — QUETIAPINE FUMARATE 25 MILLIGRAM(S): 200 TABLET, FILM COATED ORAL at 07:24

## 2024-03-25 RX ADMIN — TICAGRELOR 60 MILLIGRAM(S): 90 TABLET ORAL at 06:44

## 2024-03-25 RX ADMIN — Medication 1 DROP(S): at 06:42

## 2024-03-25 RX ADMIN — HEPARIN SODIUM 5000 UNIT(S): 5000 INJECTION INTRAVENOUS; SUBCUTANEOUS at 06:47

## 2024-03-25 RX ADMIN — Medication 2: at 06:59

## 2024-03-25 RX ADMIN — Medication 1 GRAM(S): at 06:43

## 2024-03-25 RX ADMIN — POLYETHYLENE GLYCOL 3350 17 GRAM(S): 17 POWDER, FOR SOLUTION ORAL at 11:10

## 2024-03-25 RX ADMIN — Medication 3 MILLILITER(S): at 07:48

## 2024-03-25 RX ADMIN — LEVETIRACETAM 250 MILLIGRAM(S): 250 TABLET, FILM COATED ORAL at 06:43

## 2024-03-25 RX ADMIN — ESCITALOPRAM OXALATE 10 MILLIGRAM(S): 10 TABLET, FILM COATED ORAL at 11:10

## 2024-03-25 RX ADMIN — HEPARIN SODIUM 5000 UNIT(S): 5000 INJECTION INTRAVENOUS; SUBCUTANEOUS at 15:08

## 2024-03-25 RX ADMIN — CHLORHEXIDINE GLUCONATE 1 APPLICATION(S): 213 SOLUTION TOPICAL at 11:16

## 2024-03-25 RX ADMIN — Medication 1 DROP(S): at 12:26

## 2024-03-25 RX ADMIN — HEPARIN SODIUM 5000 UNIT(S): 5000 INJECTION INTRAVENOUS; SUBCUTANEOUS at 22:25

## 2024-03-25 RX ADMIN — Medication 20 MILLIGRAM(S): at 06:44

## 2024-03-25 RX ADMIN — Medication 125 MILLIGRAM(S): at 06:43

## 2024-03-25 RX ADMIN — Medication 1 GRAM(S): at 17:37

## 2024-03-25 RX ADMIN — CARVEDILOL PHOSPHATE 6.25 MILLIGRAM(S): 80 CAPSULE, EXTENDED RELEASE ORAL at 06:22

## 2024-03-25 RX ADMIN — CARVEDILOL PHOSPHATE 6.25 MILLIGRAM(S): 80 CAPSULE, EXTENDED RELEASE ORAL at 17:38

## 2024-03-25 RX ADMIN — SENNA PLUS 10 MILLILITER(S): 8.6 TABLET ORAL at 22:15

## 2024-03-25 RX ADMIN — Medication 650 MILLIGRAM(S): at 14:07

## 2024-03-25 RX ADMIN — TICAGRELOR 60 MILLIGRAM(S): 90 TABLET ORAL at 17:38

## 2024-03-25 RX ADMIN — Medication 6 MILLIGRAM(S): at 22:16

## 2024-03-25 RX ADMIN — Medication 2: at 17:51

## 2024-03-25 RX ADMIN — Medication 1 DROP(S): at 17:39

## 2024-03-25 RX ADMIN — QUETIAPINE FUMARATE 25 MILLIGRAM(S): 200 TABLET, FILM COATED ORAL at 12:27

## 2024-03-25 RX ADMIN — LEVETIRACETAM 250 MILLIGRAM(S): 250 TABLET, FILM COATED ORAL at 17:37

## 2024-03-25 RX ADMIN — CHLORHEXIDINE GLUCONATE 15 MILLILITER(S): 213 SOLUTION TOPICAL at 06:45

## 2024-03-25 RX ADMIN — PANTOPRAZOLE SODIUM 40 MILLIGRAM(S): 20 TABLET, DELAYED RELEASE ORAL at 17:37

## 2024-03-25 RX ADMIN — PANTOPRAZOLE SODIUM 40 MILLIGRAM(S): 20 TABLET, DELAYED RELEASE ORAL at 06:44

## 2024-03-25 RX ADMIN — CHLORHEXIDINE GLUCONATE 15 MILLILITER(S): 213 SOLUTION TOPICAL at 17:37

## 2024-03-25 RX ADMIN — Medication 125 MILLIGRAM(S): at 14:08

## 2024-03-25 RX ADMIN — Medication 3 MILLILITER(S): at 19:33

## 2024-03-25 RX ADMIN — Medication 1 APPLICATION(S): at 22:16

## 2024-03-25 RX ADMIN — Medication 2 MILLIGRAM(S): at 22:16

## 2024-03-25 RX ADMIN — Medication 650 MILLIGRAM(S): at 15:07

## 2024-03-25 NOTE — BH CONSULTATION LIAISON PROGRESS NOTE - CURRENT MEDICATION
MEDICATIONS  (STANDING):  albuterol/ipratropium for Nebulization 3 milliLiter(s) Nebulizer every 12 hours  artificial  tears Solution 1 Drop(s) Left EYE four times a day  aspirin  chewable 81 milliGRAM(s) Enteral Tube daily  carvedilol 6.25 milliGRAM(s) Oral every 12 hours  chlorhexidine 0.12% Liquid 15 milliLiter(s) Oral Mucosa every 12 hours  chlorhexidine 2% Cloths 1 Application(s) Topical daily  dextrose 5%. 1000 milliLiter(s) (100 mL/Hr) IV Continuous <Continuous>  dextrose 5%. 1000 milliLiter(s) (50 mL/Hr) IV Continuous <Continuous>  dextrose 50% Injectable 25 Gram(s) IV Push once  dextrose 50% Injectable 12.5 Gram(s) IV Push once  dextrose 50% Injectable 25 Gram(s) IV Push once  doxazosin 2 milliGRAM(s) Oral at bedtime  escitalopram Solution 10 milliGRAM(s) Oral daily  furosemide    Tablet 20 milliGRAM(s) Oral daily  glucagon  Injectable 1 milliGRAM(s) IntraMuscular once  heparin   Injectable 5000 Unit(s) SubCutaneous every 8 hours  hydrALAZINE 25 milliGRAM(s) Oral every 8 hours  insulin glargine Injectable (LANTUS) 14 Unit(s) SubCutaneous <User Schedule>  insulin lispro (ADMELOG) corrective regimen sliding scale   SubCutaneous every 6 hours  levETIRAcetam  Solution 250 milliGRAM(s) Oral two times a day  melatonin 6 milliGRAM(s) Oral at bedtime  pantoprazole   Suspension 40 milliGRAM(s) Oral every 12 hours  petrolatum Ophthalmic Ointment 1 Application(s) Left EYE at bedtime  polyethylene glycol 3350 17 Gram(s) Oral daily  QUEtiapine 25 milliGRAM(s) Oral <User Schedule>  QUEtiapine 50 milliGRAM(s) Oral at bedtime  senna Syrup 10 milliLiter(s) Oral at bedtime  sucralfate suspension 1 Gram(s) Enteral Tube two times a day  ticagrelor 60 milliGRAM(s) Oral every 12 hours  valproic  acid Syrup 125 milliGRAM(s) Oral every 8 hours    MEDICATIONS  (PRN):  acetaminophen   Oral Liquid .. 650 milliGRAM(s) Oral every 6 hours PRN Temp greater or equal to 38C (100.4F), Mild Pain (1 - 3), Moderate Pain (4 - 6)  dextrose Oral Gel 15 Gram(s) Oral once PRN Blood Glucose LESS THAN 70 milliGRAM(s)/deciliter  
MEDICATIONS  (STANDING):  albuterol/ipratropium for Nebulization 3 milliLiter(s) Nebulizer every 12 hours  artificial  tears Solution 1 Drop(s) Left EYE four times a day  aspirin  chewable 81 milliGRAM(s) Enteral Tube daily  carvedilol 6.25 milliGRAM(s) Oral every 12 hours  chlorhexidine 0.12% Liquid 15 milliLiter(s) Oral Mucosa every 12 hours  chlorhexidine 2% Cloths 1 Application(s) Topical daily  darbepoetin Injectable ViaL 60 MICROGram(s) SubCutaneous once  dextrose 5%. 1000 milliLiter(s) (100 mL/Hr) IV Continuous <Continuous>  dextrose 5%. 1000 milliLiter(s) (50 mL/Hr) IV Continuous <Continuous>  dextrose 50% Injectable 25 Gram(s) IV Push once  dextrose 50% Injectable 12.5 Gram(s) IV Push once  dextrose 50% Injectable 25 Gram(s) IV Push once  doxazosin 2 milliGRAM(s) Oral at bedtime  escitalopram Solution 10 milliGRAM(s) Oral daily  furosemide    Tablet 20 milliGRAM(s) Oral daily  glucagon  Injectable 1 milliGRAM(s) IntraMuscular once  heparin   Injectable 5000 Unit(s) SubCutaneous every 8 hours  hydrALAZINE 25 milliGRAM(s) Oral every 8 hours  insulin glargine Injectable (LANTUS) 14 Unit(s) SubCutaneous <User Schedule>  insulin lispro (ADMELOG) corrective regimen sliding scale   SubCutaneous every 6 hours  levETIRAcetam  Solution 250 milliGRAM(s) Oral two times a day  melatonin 6 milliGRAM(s) Oral at bedtime  pantoprazole   Suspension 40 milliGRAM(s) Oral every 12 hours  petrolatum Ophthalmic Ointment 1 Application(s) Left EYE at bedtime  polyethylene glycol 3350 17 Gram(s) Oral daily  QUEtiapine 25 milliGRAM(s) Oral <User Schedule>  QUEtiapine 50 milliGRAM(s) Oral at bedtime  senna Syrup 10 milliLiter(s) Oral at bedtime  sucralfate suspension 1 Gram(s) Enteral Tube two times a day  ticagrelor 60 milliGRAM(s) Oral every 12 hours  valproic  acid Syrup 125 milliGRAM(s) Oral every 8 hours    MEDICATIONS  (PRN):  acetaminophen   Oral Liquid .. 650 milliGRAM(s) Oral every 6 hours PRN Temp greater or equal to 38C (100.4F), Mild Pain (1 - 3), Moderate Pain (4 - 6)  dextrose Oral Gel 15 Gram(s) Oral once PRN Blood Glucose LESS THAN 70 milliGRAM(s)/deciliter  
MEDICATIONS  (STANDING):  albuterol/ipratropium for Nebulization 3 milliLiter(s) Nebulizer every 12 hours  artificial  tears Solution 1 Drop(s) Left EYE four times a day  aspirin  chewable 81 milliGRAM(s) Enteral Tube daily  carvedilol 6.25 milliGRAM(s) Oral every 12 hours  chlorhexidine 0.12% Liquid 15 milliLiter(s) Oral Mucosa every 12 hours  chlorhexidine 2% Cloths 1 Application(s) Topical daily  dextrose 5%. 1000 milliLiter(s) (50 mL/Hr) IV Continuous <Continuous>  dextrose 5%. 1000 milliLiter(s) (100 mL/Hr) IV Continuous <Continuous>  dextrose 50% Injectable 25 Gram(s) IV Push once  dextrose 50% Injectable 25 Gram(s) IV Push once  dextrose 50% Injectable 12.5 Gram(s) IV Push once  divalproex Sprinkle 125 milliGRAM(s) Oral two times a day  doxazosin 2 milliGRAM(s) Oral at bedtime  escitalopram Solution 10 milliGRAM(s) Oral daily  furosemide    Tablet 20 milliGRAM(s) Oral daily  glucagon  Injectable 1 milliGRAM(s) IntraMuscular once  heparin   Injectable 5000 Unit(s) SubCutaneous every 8 hours  hydrALAZINE 50 milliGRAM(s) Oral every 8 hours  insulin glargine Injectable (LANTUS) 14 Unit(s) SubCutaneous <User Schedule>  insulin lispro (ADMELOG) corrective regimen sliding scale   SubCutaneous every 6 hours  levETIRAcetam  Solution 500 milliGRAM(s) Oral two times a day  melatonin 6 milliGRAM(s) Oral at bedtime  pantoprazole   Suspension 40 milliGRAM(s) Oral every 12 hours  petrolatum Ophthalmic Ointment 1 Application(s) Left EYE at bedtime  polyethylene glycol 3350 17 Gram(s) Oral daily  senna Syrup 10 milliLiter(s) Oral at bedtime  sucralfate suspension 1 Gram(s) Enteral Tube two times a day  ticagrelor 60 milliGRAM(s) Oral every 12 hours    MEDICATIONS  (PRN):  acetaminophen   Oral Liquid .. 650 milliGRAM(s) Oral every 6 hours PRN Temp greater or equal to 38C (100.4F), Mild Pain (1 - 3), Moderate Pain (4 - 6)  dextrose Oral Gel 15 Gram(s) Oral once PRN Blood Glucose LESS THAN 70 milliGRAM(s)/deciliter  LORazepam     Tablet 0.5 milliGRAM(s) Oral every 6 hours PRN Agitation  tranexamic acid Injectable for Nebulization 500 milliGRAM(s) Inhalation every 8 hours PRN hemoptysis  
MEDICATIONS  (STANDING):  albuterol/ipratropium for Nebulization 3 milliLiter(s) Nebulizer every 12 hours  artificial  tears Solution 1 Drop(s) Left EYE four times a day  aspirin  chewable 81 milliGRAM(s) Enteral Tube daily  carvedilol 6.25 milliGRAM(s) Oral every 12 hours  chlorhexidine 0.12% Liquid 15 milliLiter(s) Oral Mucosa every 12 hours  chlorhexidine 2% Cloths 1 Application(s) Topical daily  dextrose 5%. 1000 milliLiter(s) (100 mL/Hr) IV Continuous <Continuous>  dextrose 5%. 1000 milliLiter(s) (50 mL/Hr) IV Continuous <Continuous>  dextrose 50% Injectable 25 Gram(s) IV Push once  dextrose 50% Injectable 12.5 Gram(s) IV Push once  dextrose 50% Injectable 25 Gram(s) IV Push once  doxazosin 2 milliGRAM(s) Oral at bedtime  escitalopram Solution 10 milliGRAM(s) Oral daily  furosemide    Tablet 20 milliGRAM(s) Oral daily  glucagon  Injectable 1 milliGRAM(s) IntraMuscular once  heparin   Injectable 5000 Unit(s) SubCutaneous every 8 hours  hydrALAZINE 25 milliGRAM(s) Oral every 8 hours  insulin glargine Injectable (LANTUS) 14 Unit(s) SubCutaneous <User Schedule>  insulin lispro (ADMELOG) corrective regimen sliding scale   SubCutaneous every 6 hours  levETIRAcetam  Solution 250 milliGRAM(s) Oral two times a day  melatonin 6 milliGRAM(s) Oral at bedtime  pantoprazole   Suspension 40 milliGRAM(s) Oral every 12 hours  petrolatum Ophthalmic Ointment 1 Application(s) Left EYE at bedtime  polyethylene glycol 3350 17 Gram(s) Oral daily  QUEtiapine 25 milliGRAM(s) Oral <User Schedule>  QUEtiapine 75 milliGRAM(s) Oral at bedtime  senna Syrup 10 milliLiter(s) Oral at bedtime  sucralfate suspension 1 Gram(s) Enteral Tube two times a day  ticagrelor 60 milliGRAM(s) Oral every 12 hours  valproic  acid Syrup 125 milliGRAM(s) Oral every 8 hours    MEDICATIONS  (PRN):  acetaminophen   Oral Liquid .. 650 milliGRAM(s) Oral every 6 hours PRN Temp greater or equal to 38C (100.4F), Mild Pain (1 - 3), Moderate Pain (4 - 6)  dextrose Oral Gel 15 Gram(s) Oral once PRN Blood Glucose LESS THAN 70 milliGRAM(s)/deciliter  QUEtiapine 12.5 milliGRAM(s) Oral every 6 hours PRN agitation  
MEDICATIONS  (STANDING):  albuterol/ipratropium for Nebulization 3 milliLiter(s) Nebulizer every 12 hours  artificial  tears Solution 1 Drop(s) Left EYE four times a day  aspirin  chewable 81 milliGRAM(s) Enteral Tube daily  carvedilol 6.25 milliGRAM(s) Oral every 12 hours  chlorhexidine 0.12% Liquid 15 milliLiter(s) Oral Mucosa every 12 hours  chlorhexidine 2% Cloths 1 Application(s) Topical daily  dextrose 5%. 1000 milliLiter(s) (50 mL/Hr) IV Continuous <Continuous>  dextrose 5%. 1000 milliLiter(s) (100 mL/Hr) IV Continuous <Continuous>  dextrose 50% Injectable 25 Gram(s) IV Push once  dextrose 50% Injectable 12.5 Gram(s) IV Push once  dextrose 50% Injectable 25 Gram(s) IV Push once  doxazosin 2 milliGRAM(s) Oral at bedtime  escitalopram Solution 10 milliGRAM(s) Oral daily  furosemide    Tablet 20 milliGRAM(s) Oral daily  glucagon  Injectable 1 milliGRAM(s) IntraMuscular once  heparin   Injectable 5000 Unit(s) SubCutaneous every 8 hours  hydrALAZINE 25 milliGRAM(s) Oral every 8 hours  insulin glargine Injectable (LANTUS) 14 Unit(s) SubCutaneous <User Schedule>  insulin lispro (ADMELOG) corrective regimen sliding scale   SubCutaneous every 6 hours  levETIRAcetam  Solution 250 milliGRAM(s) Oral two times a day  melatonin 6 milliGRAM(s) Oral at bedtime  pantoprazole   Suspension 40 milliGRAM(s) Oral every 12 hours  petrolatum Ophthalmic Ointment 1 Application(s) Left EYE at bedtime  polyethylene glycol 3350 17 Gram(s) Oral daily  senna Syrup 10 milliLiter(s) Oral at bedtime  sucralfate suspension 1 Gram(s) Enteral Tube two times a day  ticagrelor 60 milliGRAM(s) Oral every 12 hours  valproic  acid Syrup 375 milliGRAM(s) Oral <User Schedule>  valproic  acid Syrup 125 milliGRAM(s) Oral <User Schedule>    MEDICATIONS  (PRN):  acetaminophen   Oral Liquid .. 650 milliGRAM(s) Oral every 6 hours PRN Temp greater or equal to 38C (100.4F), Mild Pain (1 - 3), Moderate Pain (4 - 6)  dextrose Oral Gel 15 Gram(s) Oral once PRN Blood Glucose LESS THAN 70 milliGRAM(s)/deciliter  LORazepam     Tablet 0.5 milliGRAM(s) Oral every 6 hours PRN Agitation  
MEDICATIONS  (STANDING):  albuterol/ipratropium for Nebulization 3 milliLiter(s) Nebulizer every 12 hours  artificial  tears Solution 1 Drop(s) Left EYE four times a day  aspirin  chewable 81 milliGRAM(s) Enteral Tube daily  carvedilol 6.25 milliGRAM(s) Oral every 12 hours  chlorhexidine 0.12% Liquid 15 milliLiter(s) Oral Mucosa every 12 hours  chlorhexidine 2% Cloths 1 Application(s) Topical daily  dextrose 5%. 1000 milliLiter(s) (50 mL/Hr) IV Continuous <Continuous>  dextrose 5%. 1000 milliLiter(s) (100 mL/Hr) IV Continuous <Continuous>  dextrose 50% Injectable 25 Gram(s) IV Push once  dextrose 50% Injectable 12.5 Gram(s) IV Push once  dextrose 50% Injectable 25 Gram(s) IV Push once  doxazosin 2 milliGRAM(s) Oral at bedtime  escitalopram Solution 10 milliGRAM(s) Oral daily  furosemide    Tablet 20 milliGRAM(s) Oral daily  glucagon  Injectable 1 milliGRAM(s) IntraMuscular once  heparin   Injectable 5000 Unit(s) SubCutaneous every 8 hours  hydrALAZINE 25 milliGRAM(s) Oral every 8 hours  insulin glargine Injectable (LANTUS) 14 Unit(s) SubCutaneous <User Schedule>  insulin lispro (ADMELOG) corrective regimen sliding scale   SubCutaneous every 6 hours  levETIRAcetam  Solution 250 milliGRAM(s) Oral two times a day  melatonin 6 milliGRAM(s) Oral at bedtime  pantoprazole   Suspension 40 milliGRAM(s) Oral every 12 hours  petrolatum Ophthalmic Ointment 1 Application(s) Left EYE at bedtime  polyethylene glycol 3350 17 Gram(s) Oral daily  senna Syrup 10 milliLiter(s) Oral at bedtime  sucralfate suspension 1 Gram(s) Enteral Tube two times a day  ticagrelor 60 milliGRAM(s) Oral every 12 hours  valproic  acid Syrup 125 milliGRAM(s) Oral every 8 hours    MEDICATIONS  (PRN):  acetaminophen   Oral Liquid .. 650 milliGRAM(s) Oral every 6 hours PRN Temp greater or equal to 38C (100.4F), Mild Pain (1 - 3), Moderate Pain (4 - 6)  dextrose Oral Gel 15 Gram(s) Oral once PRN Blood Glucose LESS THAN 70 milliGRAM(s)/deciliter  
MEDICATIONS  (STANDING):  albuterol/ipratropium for Nebulization 3 milliLiter(s) Nebulizer every 12 hours  artificial  tears Solution 1 Drop(s) Left EYE four times a day  aspirin  chewable 81 milliGRAM(s) Enteral Tube daily  carvedilol 6.25 milliGRAM(s) Oral every 12 hours  chlorhexidine 0.12% Liquid 15 milliLiter(s) Oral Mucosa every 12 hours  chlorhexidine 2% Cloths 1 Application(s) Topical daily  dextrose 5%. 1000 milliLiter(s) (100 mL/Hr) IV Continuous <Continuous>  dextrose 5%. 1000 milliLiter(s) (50 mL/Hr) IV Continuous <Continuous>  dextrose 50% Injectable 25 Gram(s) IV Push once  dextrose 50% Injectable 12.5 Gram(s) IV Push once  dextrose 50% Injectable 25 Gram(s) IV Push once  doxazosin 2 milliGRAM(s) Oral at bedtime  escitalopram Solution 10 milliGRAM(s) Oral daily  furosemide    Tablet 20 milliGRAM(s) Oral daily  glucagon  Injectable 1 milliGRAM(s) IntraMuscular once  heparin   Injectable 5000 Unit(s) SubCutaneous every 8 hours  insulin glargine Injectable (LANTUS) 14 Unit(s) SubCutaneous <User Schedule>  insulin lispro (ADMELOG) corrective regimen sliding scale   SubCutaneous every 6 hours  levETIRAcetam  Solution 250 milliGRAM(s) Oral two times a day  melatonin 6 milliGRAM(s) Oral at bedtime  pantoprazole   Suspension 40 milliGRAM(s) Oral every 12 hours  petrolatum Ophthalmic Ointment 1 Application(s) Left EYE at bedtime  polyethylene glycol 3350 17 Gram(s) Oral daily  QUEtiapine 75 milliGRAM(s) Oral at bedtime  senna Syrup 10 milliLiter(s) Oral at bedtime  sucralfate suspension 1 Gram(s) Enteral Tube two times a day  ticagrelor 60 milliGRAM(s) Oral every 12 hours  valproic  acid Syrup 125 milliGRAM(s) Oral every 8 hours    MEDICATIONS  (PRN):  acetaminophen   Oral Liquid .. 650 milliGRAM(s) Oral every 6 hours PRN Temp greater or equal to 38C (100.4F), Mild Pain (1 - 3), Moderate Pain (4 - 6)  dextrose Oral Gel 15 Gram(s) Oral once PRN Blood Glucose LESS THAN 70 milliGRAM(s)/deciliter  LORazepam     Tablet 0.25 milliGRAM(s) Oral every 6 hours PRN severe agitation  QUEtiapine 12.5 milliGRAM(s) Oral every 6 hours PRN agitation  
MEDICATIONS  (STANDING):  albuterol/ipratropium for Nebulization 3 milliLiter(s) Nebulizer every 12 hours  artificial  tears Solution 1 Drop(s) Left EYE four times a day  aspirin  chewable 81 milliGRAM(s) Enteral Tube daily  carvedilol 6.25 milliGRAM(s) Oral every 12 hours  chlorhexidine 0.12% Liquid 15 milliLiter(s) Oral Mucosa every 12 hours  chlorhexidine 2% Cloths 1 Application(s) Topical daily  dextrose 5%. 1000 milliLiter(s) (100 mL/Hr) IV Continuous <Continuous>  dextrose 5%. 1000 milliLiter(s) (50 mL/Hr) IV Continuous <Continuous>  dextrose 50% Injectable 25 Gram(s) IV Push once  dextrose 50% Injectable 25 Gram(s) IV Push once  dextrose 50% Injectable 12.5 Gram(s) IV Push once  doxazosin 2 milliGRAM(s) Oral at bedtime  escitalopram Solution 10 milliGRAM(s) Oral daily  furosemide    Tablet 20 milliGRAM(s) Oral daily  glucagon  Injectable 1 milliGRAM(s) IntraMuscular once  heparin   Injectable 5000 Unit(s) SubCutaneous every 8 hours  hydrALAZINE 25 milliGRAM(s) Oral every 8 hours  insulin glargine Injectable (LANTUS) 14 Unit(s) SubCutaneous <User Schedule>  insulin lispro (ADMELOG) corrective regimen sliding scale   SubCutaneous every 6 hours  levETIRAcetam  Solution 250 milliGRAM(s) Oral two times a day  melatonin 6 milliGRAM(s) Oral at bedtime  pantoprazole   Suspension 40 milliGRAM(s) Oral every 12 hours  petrolatum Ophthalmic Ointment 1 Application(s) Left EYE at bedtime  polyethylene glycol 3350 17 Gram(s) Oral daily  senna Syrup 10 milliLiter(s) Oral at bedtime  sucralfate suspension 1 Gram(s) Enteral Tube two times a day  ticagrelor 60 milliGRAM(s) Oral every 12 hours  valproic  acid Syrup 250 milliGRAM(s) Oral <User Schedule>  valproic  acid Syrup 125 milliGRAM(s) Oral <User Schedule>    MEDICATIONS  (PRN):  acetaminophen   Oral Liquid .. 650 milliGRAM(s) Oral every 6 hours PRN Temp greater or equal to 38C (100.4F), Mild Pain (1 - 3), Moderate Pain (4 - 6)  dextrose Oral Gel 15 Gram(s) Oral once PRN Blood Glucose LESS THAN 70 milliGRAM(s)/deciliter  OLANZapine Injectable 1.25 milliGRAM(s) IntraMuscular every 6 hours PRN for severe agitation

## 2024-03-25 NOTE — PROGRESS NOTE ADULT - NS ATTEND AMEND GEN_ALL_CORE FT
agree with above  son at bedside  medically pt remains stable  he continues at times to reach for the trach when fully alert. otherwise no issues -   I explained that this may be his way of expressing he does not want the trach, however at this point can not well assess  however, if family wishes is to keep trach and vent, then we will need to continue the anti-agitation meds and at times, benzo  increase the am seroquel dose. don't the prn ativan.  follow up appreciated  d/c plan for tomorrow

## 2024-03-25 NOTE — PROGRESS NOTE ADULT - SUBJECTIVE AND OBJECTIVE BOX
NEPHROLOGY     Patient seen and examined with staff at bedside, pt lin loya, no acute distress.   calm ,1:1  family at bedside       ROS ; unable to take due to mentation   MEDICATIONS  (STANDING):  albuterol/ipratropium for Nebulization 3 milliLiter(s) Nebulizer every 12 hours  artificial  tears Solution 1 Drop(s) Left EYE four times a day  aspirin  chewable 81 milliGRAM(s) Enteral Tube daily  carvedilol 6.25 milliGRAM(s) Oral every 12 hours  chlorhexidine 0.12% Liquid 15 milliLiter(s) Oral Mucosa every 12 hours  chlorhexidine 2% Cloths 1 Application(s) Topical daily  dextrose 5%. 1000 milliLiter(s) (100 mL/Hr) IV Continuous <Continuous>  dextrose 5%. 1000 milliLiter(s) (50 mL/Hr) IV Continuous <Continuous>  dextrose 50% Injectable 25 Gram(s) IV Push once  dextrose 50% Injectable 25 Gram(s) IV Push once  dextrose 50% Injectable 12.5 Gram(s) IV Push once  divalproex Sprinkle 125 milliGRAM(s) Oral two times a day  doxazosin 2 milliGRAM(s) Oral at bedtime  escitalopram Solution 10 milliGRAM(s) Oral daily  furosemide    Tablet 20 milliGRAM(s) Oral daily  glucagon  Injectable 1 milliGRAM(s) IntraMuscular once  heparin   Injectable 5000 Unit(s) SubCutaneous every 8 hours  hydrALAZINE 50 milliGRAM(s) Oral every 8 hours  insulin glargine Injectable (LANTUS) 14 Unit(s) SubCutaneous <User Schedule>  insulin lispro (ADMELOG) corrective regimen sliding scale   SubCutaneous every 6 hours  levETIRAcetam  Solution 500 milliGRAM(s) Oral two times a day  melatonin 6 milliGRAM(s) Oral at bedtime  pantoprazole   Suspension 40 milliGRAM(s) Oral every 12 hours  petrolatum Ophthalmic Ointment 1 Application(s) Left EYE at bedtime  polyethylene glycol 3350 17 Gram(s) Oral daily  senna Syrup 10 milliLiter(s) Oral at bedtime  sucralfate suspension 1 Gram(s) Enteral Tube two times a day  ticagrelor 60 milliGRAM(s) Oral every 12 hours    VITALS:  T(C): , Max: 37 (03-11-24 @ 11:51)  T(F): , Max: 98.6 (03-11-24 @ 11:51)  HR: 95 (03-12-24 @ 11:30)  BP: 113/52 (03-12-24 @ 09:00)  BP(mean): 57 (03-12-24 @ 09:00)  RR: 18 (03-12-24 @ 09:00)  SpO2: 97% (03-12-24 @ 11:30)  Wt(kg): --  I and O's:    03-11 @ 07:01  -  03-12 @ 07:00  --------------------------------------------------------  IN: 1450 mL / OUT: 20 mL / NET: 1430 mL    PHYSICAL EXAM:  Constitutional: trach to vent   HEENT: +trach  Respiratory: coarse bs   Cardiovascular: normal s1s2  Gastrointestinal: BS+, soft, NT/ND +PEG  Extremities:+ peripheral edema  : + external catheter   Skin: No rashes    LABS:                        8.0    8.32  )-----------( 340      ( 12 Mar 2024 05:30 )             25.5     03-12    137  |  97<L>  |  50<H>  ----------------------------<  190<H>  4.7   |  30  |  1.50<H>    Ca    9.0      12 Mar 2024 05:30  Phos  3.8     03-12  Mg     2.50     03-12    TPro  7.2  /  Alb  3.0<L>  /  TBili  0.3  /  DBili  <0.2  /  AST  22  /  ALT  19  /  AlkPhos  176<H>  03-12      Urine Studies:  Urinalysis Basic - ( 12 Mar 2024 05:30 )    Color: x / Appearance: x / SG: x / pH: x  Gluc: 190 mg/dL / Ketone: x  / Bili: x / Urobili: x   Blood: x / Protein: x / Nitrite: x   Leuk Esterase: x / RBC: x / WBC x   Sq Epi: x / Non Sq Epi: x / Bacteria: x    RADIOLOGY & ADDITIONAL STUDIES:    rad< from: US Duplex Venous Lower Ext Complete, Bilateral (03.11.24 @ 19:29) >  IMPRESSION:  No evidence of deep venous thrombosis in either lower extremity.    Increased venous pulsatility suggestive of right-sided cardiac   dysfunction.        --- End of Report ---      ADEBAYO PALACIO MD; Attending Radiologist  This document has been electronically signed. Mar 11 2024  7:37PM    < end of copied text >

## 2024-03-25 NOTE — BH CONSULTATION LIAISON PROGRESS NOTE - NSBHCONSULTWORKUPLABSFT_PSY_ALL_CORE
monitor LFTs, ammonia, CBC, VPA level

## 2024-03-25 NOTE — BH CONSULTATION LIAISON PROGRESS NOTE - NSBHFUPINTERVALHXFT_PSY_A_CORE
No PRNs required overnight. This AM patient was observed awake and alert, making waving hand movements that appeared prayer-like, not observed pulling on trach.    
Chart reviewed. Overnight the patient received ativan 0.25 and ativan 0.5 for agitation. Per family at bedside, the patient repeatedly tried to pull out his trach, was not sleeping, requiring both hands to be held at all times. Increasingly agitated and irritable.   On assessment patient is resting, awake, continues to intermittent move his hands to the trach before staff/family redirect him. Spoke with both sons about medication changes and plan.
Chart reviewed. Overnight patient received olanzapine PRN x2 for agitation and ativan x1 this AM. Per nursing staff, the olanzapine did not help with the patients agitation, but he appeared to respond to ativan PRN.    On assessment patient is sleeping with son at bedside. Discussed plan with family. 
Patient seen this afternoon. Has not required a PRN in 24 hours.  Found sleeping but awakens to voice and smiles at me and extends hand to shake it. Follows basic command.  Son by bedside. States that overall he has improved with agitation.
Per chart review, over the weekend patient became obtunded on saturday night, Sunday during the day was very agitated, pulling out trach. Received seroquel 12.5mg PO around 2pm, ativan 0.25mg at last night and again this morning. Repeat head CT unchanged from prior, VBG wnl.   PCA is holding his hand, when she lets go his hand migrates to his trach. Per 1:1 staff the patient has been more agitated and has been kicking.  Ammonia improved with decreasing depakote. 
Patient agitated during the day today, 1:1 staff reported that patient was awake since 11pm last night. Received ativan 0.25mg PRN x3 today for ongoing agitation.    On assessment this afternoon patient is sitting up in bed, eyes open, attending to interviewer, making movements with his hands and attempting to speak. Son at bedside attempted to ask him orientation questions and basic commands but patient did not respond. Family reports that patient usually falls asleep after getting ativan, but today it has not been helping.     
Chart reviewed. Overnight the patient received haldol 0.25mg for agitation. Per family at bedside, the patient repeatedly tried to pull out his trach, was not sleeping, requiring both hands to be held at all times. Increasingly agitated and irritable.   On assessment patient is resting comfortably in bed with eyes closed. Plan discussed with son at bedside. Cogwheel rigidity of upper extremities.
Patient seen this late AM. He has son by bedside. Patient is sleeping soundly. Decision made to not awaken patient.  Spoke with sons- neurologist son on telephone. Concern that patient remains either agitated or sedated. Son suggested trying low dose klonopin in AM.    I further collaborated with lead RCU attending. At this time, plan was to increase AM seroquel rather than add klonopin. RCU team can try this plan and I defer to them.

## 2024-03-25 NOTE — BH CONSULTATION LIAISON PROGRESS NOTE - NSBHFUPINTERVALCCFT_PSY_A_CORE
follow up agitation	

## 2024-03-25 NOTE — BH CONSULTATION LIAISON PROGRESS NOTE - MSE UNSTRUCTURED FT
Limited exam- patient sleeping soundly    
Limited exam, patient nonverbal due to trach.   patient was observed awake and alert, patient is sitting up in bed, eyes open, attending to interviewer, making movements with his hands and attempting to speak. Son at bedside attempted to ask him orientation questions and basic commands but patient did not respond    
Limited exam, patient nonverbal due to trach.   patient was observed awake and alert, patient is sitting up in bed, eyes open, attending to interviewer, making movements with his hands and attempting to speak.  Affect appears euthymic.  Does not appear internally preoccupied.  Tremor with intention    
Limited exam, patient nonverbal due to trach.   On exam patient is resting comfortably with eyes closed. PCA is holding his hand, when she lets go his hand migrates to his trach. Per 1:1 staff the patient has been more agitated and has been kicking. Does not respond to simple commands (family reports that he sometimes will squeeze their fingers on command).  Physical exam notable for cogwheel rigidity of UEs. No evidence of tremor.     
Limited exam, patient nonverbal due to trach.   On exam patient attends to interviewer and son with his eyes, nods his head to some questions but it mostly uncooperative with exam and appears somewhat delirious and confused. Unclear if he knows where he is. Does not respond to simple commands (family reports that he sometimes will squeeze their fingers on command).  Physical exam notable for cogwheel rigidity of UEs. No evidence of tremor.     
Limited exam, patient nonverbal due to trach.   patient was observed awake and alert, making waving hand movements that appeared prayer-like, not observed pulling on trach. Does not respond to simple commands (family reports that he sometimes will squeeze their fingers on command).  Physical exam notable for cogwheel rigidity of UEs. No evidence of tremor.     
Limited exam, patient nonverbal due to trach.   On exam patient attends to interviewer and son with his eyes, nods his head to some questions but it mostly uncooperative with exam and appears somewhat delirious and confused. Unclear if he knows where he is. Does not respond to simple commands (family reports that he sometimes will squeeze their fingers on command).  Physical exam notable for cogwheel rigidity of UEs. No evidence of tremor.     
Limited exam, patient nonverbal due to trach.   On exam patient attends to interviewer and son with his eyes, nods his head to some questions but it mostly uncooperative with exam and appears somewhat delirious and confused. Unclear if he knows where he is. Does not respond to simple commands (family reports that he sometimes will squeeze their fingers on command).  Physical exam notable for cogwheel rigidity of UEs. No evidence of tremor.

## 2024-03-25 NOTE — BH CONSULTATION LIAISON PROGRESS NOTE - NSBHCONSULTRECOMMENDOTHER_PSY_A_CORE FT
Delirium Precautions:  - Avoid/minimize deliriogenic medications including benzodiazepines, hypnotics (e.g. Ambien), anti-cholinergics (e.g. Benadryl), opiates.  - Avoid physical restraints, encourage early mobilization of patient once safe.  - Facilitate physiologic sleep: keep lights off at night and on during day, minimize disturbance of patient overnight.  - Manage pain (but without opiate pain meds if possible).  - Provide frequent reorientation of patient and cognitive stimulation.     

## 2024-03-25 NOTE — PROGRESS NOTE ADULT - SUBJECTIVE AND OBJECTIVE BOX
CHIEF COMPLAINT: Patient is a 89y old  Male who presents with a chief complaint of COVID19, Chest pain (24 Mar 2024 06:57)      INTERVAL EVENTS: No overnight events.     ROS: Seen by bedside during AM rounds     OBJECTIVE:  ICU Vital Signs Last 24 Hrs  T(C): 36.3 (25 Mar 2024 04:00), Max: 36.3 (25 Mar 2024 04:00)  T(F): 97.4 (25 Mar 2024 04:00), Max: 97.4 (25 Mar 2024 04:00)  HR: 79 (25 Mar 2024 04:00) (68 - 81)  BP: 105/43 (25 Mar 2024 04:00) (105/43 - 135/68)  BP(mean): --  ABP: --  ABP(mean): --  RR: 16 (25 Mar 2024 04:00) (16 - 21)  SpO2: 99% (25 Mar 2024 04:00) (98% - 100%)    O2 Parameters below as of 25 Mar 2024 04:00  Patient On (Oxygen Delivery Method): ventilator          Mode: AC/ CMV (Assist Control/ Continuous Mandatory Ventilation), RR (machine): 16, TV (machine): 400, FiO2: 30, PEEP: 5, MAP: 10, PIP: 36    03-24 @ 07:01  -  03-25 @ 07:00  --------------------------------------------------------  IN: 1700 mL / OUT: 1370 mL / NET: 330 mL      CAPILLARY BLOOD GLUCOSE      POCT Blood Glucose.: 160 mg/dL (25 Mar 2024 06:17)      PHYSICAL EXAM:  General:   HEENT:   Lymph Nodes:  Neck:   Respiratory:   Cardiovascular:   Abdomen:   Extremities:   Skin:   Neurological:  Psychiatry:    Mode: AC/ CMV (Assist Control/ Continuous Mandatory Ventilation)  RR (machine): 16  TV (machine): 400  FiO2: 30  PEEP: 5  MAP: 10  PIP: 36      HOSPITAL MEDICATIONS:  MEDICATIONS  (STANDING):  albuterol/ipratropium for Nebulization 3 milliLiter(s) Nebulizer every 12 hours  artificial  tears Solution 1 Drop(s) Left EYE four times a day  aspirin  chewable 81 milliGRAM(s) Enteral Tube daily  carvedilol 6.25 milliGRAM(s) Oral every 12 hours  chlorhexidine 0.12% Liquid 15 milliLiter(s) Oral Mucosa every 12 hours  chlorhexidine 2% Cloths 1 Application(s) Topical daily  dextrose 5%. 1000 milliLiter(s) (100 mL/Hr) IV Continuous <Continuous>  dextrose 5%. 1000 milliLiter(s) (50 mL/Hr) IV Continuous <Continuous>  dextrose 50% Injectable 25 Gram(s) IV Push once  dextrose 50% Injectable 12.5 Gram(s) IV Push once  dextrose 50% Injectable 25 Gram(s) IV Push once  doxazosin 2 milliGRAM(s) Oral at bedtime  escitalopram Solution 10 milliGRAM(s) Oral daily  furosemide    Tablet 20 milliGRAM(s) Oral daily  glucagon  Injectable 1 milliGRAM(s) IntraMuscular once  heparin   Injectable 5000 Unit(s) SubCutaneous every 8 hours  insulin glargine Injectable (LANTUS) 14 Unit(s) SubCutaneous <User Schedule>  insulin lispro (ADMELOG) corrective regimen sliding scale   SubCutaneous every 6 hours  levETIRAcetam  Solution 250 milliGRAM(s) Oral two times a day  melatonin 6 milliGRAM(s) Oral at bedtime  pantoprazole   Suspension 40 milliGRAM(s) Oral every 12 hours  petrolatum Ophthalmic Ointment 1 Application(s) Left EYE at bedtime  polyethylene glycol 3350 17 Gram(s) Oral daily  QUEtiapine 75 milliGRAM(s) Oral at bedtime  QUEtiapine 25 milliGRAM(s) Oral <User Schedule>  senna Syrup 10 milliLiter(s) Oral at bedtime  sucralfate suspension 1 Gram(s) Enteral Tube two times a day  ticagrelor 60 milliGRAM(s) Oral every 12 hours  valproic  acid Syrup 125 milliGRAM(s) Oral every 8 hours    MEDICATIONS  (PRN):  acetaminophen   Oral Liquid .. 650 milliGRAM(s) Oral every 6 hours PRN Temp greater or equal to 38C (100.4F), Mild Pain (1 - 3), Moderate Pain (4 - 6)  dextrose Oral Gel 15 Gram(s) Oral once PRN Blood Glucose LESS THAN 70 milliGRAM(s)/deciliter  QUEtiapine 12.5 milliGRAM(s) Oral every 6 hours PRN agitation      LABS:                        8.8    6.31  )-----------( 391      ( 25 Mar 2024 05:40 )             27.3     03-25    141  |  100  |  46<H>  ----------------------------<  122<H>  4.3   |  30  |  1.34<H>    Ca    9.0      25 Mar 2024 05:40  Phos  3.6     03-25  Mg     2.40     03-25    TPro  7.0  /  Alb  2.9<L>  /  TBili  0.2  /  DBili  <0.2  /  AST  18  /  ALT  11  /  AlkPhos  108  03-25      Urinalysis Basic - ( 25 Mar 2024 05:40 )    Color: x / Appearance: x / SG: x / pH: x  Gluc: 122 mg/dL / Ketone: x  / Bili: x / Urobili: x   Blood: x / Protein: x / Nitrite: x   Leuk Esterase: x / RBC: x / WBC x   Sq Epi: x / Non Sq Epi: x / Bacteria: x        Venous Blood Gas:  03-25 @ 05:40  7.46/45/43/32/69.7  VBG Lactate: --      INTERVAL EVENTS:     ROS: Seen by bedside during AM rounds     OBJECTIVE:  ICU Vital Signs Last 24 Hrs  T(C): 36.3 (25 Mar 2024 04:00), Max: 36.3 (25 Mar 2024 04:00)  T(F): 97.4 (25 Mar 2024 04:00), Max: 97.4 (25 Mar 2024 04:00)  HR: 79 (25 Mar 2024 04:00) (68 - 81)  BP: 105/43 (25 Mar 2024 04:00) (105/43 - 135/68)  BP(mean): --  ABP: --  ABP(mean): --  RR: 16 (25 Mar 2024 04:00) (16 - 21)  SpO2: 99% (25 Mar 2024 04:00) (98% - 100%)    O2 Parameters below as of 25 Mar 2024 04:00  Patient On (Oxygen Delivery Method): ventilator          Mode: AC/ CMV (Assist Control/ Continuous Mandatory Ventilation), RR (machine): 16, TV (machine): 400, FiO2: 30, PEEP: 5, MAP: 10, PIP: 36    03-24 @ 07:01  -  03-25 @ 07:00  --------------------------------------------------------  IN: 1700 mL / OUT: 1370 mL / NET: 330 mL      CAPILLARY BLOOD GLUCOSE      POCT Blood Glucose.: 160 mg/dL (25 Mar 2024 06:17)      PHYSICAL EXAM:  General:   HEENT:   Lymph Nodes:  Neck:   Respiratory:   Cardiovascular:   Abdomen:   Extremities:   Skin:   Neurological:  Psychiatry:    Mode: AC/ CMV (Assist Control/ Continuous Mandatory Ventilation)  RR (machine): 16  TV (machine): 400  FiO2: 30  PEEP: 5  MAP: 10  PIP: 36      HOSPITAL MEDICATIONS:  MEDICATIONS  (STANDING):  albuterol/ipratropium for Nebulization 3 milliLiter(s) Nebulizer every 12 hours  artificial  tears Solution 1 Drop(s) Left EYE four times a day  aspirin  chewable 81 milliGRAM(s) Enteral Tube daily  carvedilol 6.25 milliGRAM(s) Oral every 12 hours  chlorhexidine 0.12% Liquid 15 milliLiter(s) Oral Mucosa every 12 hours  chlorhexidine 2% Cloths 1 Application(s) Topical daily  dextrose 5%. 1000 milliLiter(s) (100 mL/Hr) IV Continuous <Continuous>  dextrose 5%. 1000 milliLiter(s) (50 mL/Hr) IV Continuous <Continuous>  dextrose 50% Injectable 25 Gram(s) IV Push once  dextrose 50% Injectable 12.5 Gram(s) IV Push once  dextrose 50% Injectable 25 Gram(s) IV Push once  doxazosin 2 milliGRAM(s) Oral at bedtime  escitalopram Solution 10 milliGRAM(s) Oral daily  furosemide    Tablet 20 milliGRAM(s) Oral daily  glucagon  Injectable 1 milliGRAM(s) IntraMuscular once  heparin   Injectable 5000 Unit(s) SubCutaneous every 8 hours  insulin glargine Injectable (LANTUS) 14 Unit(s) SubCutaneous <User Schedule>  insulin lispro (ADMELOG) corrective regimen sliding scale   SubCutaneous every 6 hours  levETIRAcetam  Solution 250 milliGRAM(s) Oral two times a day  melatonin 6 milliGRAM(s) Oral at bedtime  pantoprazole   Suspension 40 milliGRAM(s) Oral every 12 hours  petrolatum Ophthalmic Ointment 1 Application(s) Left EYE at bedtime  polyethylene glycol 3350 17 Gram(s) Oral daily  QUEtiapine 75 milliGRAM(s) Oral at bedtime  QUEtiapine 25 milliGRAM(s) Oral <User Schedule>  senna Syrup 10 milliLiter(s) Oral at bedtime  sucralfate suspension 1 Gram(s) Enteral Tube two times a day  ticagrelor 60 milliGRAM(s) Oral every 12 hours  valproic  acid Syrup 125 milliGRAM(s) Oral every 8 hours    MEDICATIONS  (PRN):  acetaminophen   Oral Liquid .. 650 milliGRAM(s) Oral every 6 hours PRN Temp greater or equal to 38C (100.4F), Mild Pain (1 - 3), Moderate Pain (4 - 6)  dextrose Oral Gel 15 Gram(s) Oral once PRN Blood Glucose LESS THAN 70 milliGRAM(s)/deciliter  QUEtiapine 12.5 milliGRAM(s) Oral every 6 hours PRN agitation      LABS:                        8.8    6.31  )-----------( 391      ( 25 Mar 2024 05:40 )             27.3     03-25    141  |  100  |  46<H>  ----------------------------<  122<H>  4.3   |  30  |  1.34<H>    Ca    9.0      25 Mar 2024 05:40  Phos  3.6     03-25  Mg     2.40     03-25    TPro  7.0  /  Alb  2.9<L>  /  TBili  0.2  /  DBili  <0.2  /  AST  18  /  ALT  11  /  AlkPhos  108  03-25      Urinalysis Basic - ( 25 Mar 2024 05:40 )    Color: x / Appearance: x / SG: x / pH: x  Gluc: 122 mg/dL / Ketone: x  / Bili: x / Urobili: x   Blood: x / Protein: x / Nitrite: x   Leuk Esterase: x / RBC: x / WBC x   Sq Epi: x / Non Sq Epi: x / Bacteria: x        Venous Blood Gas:  03-25 @ 05:40  7.46/45/43/32/69.7  VBG Lactate: --   CHIEF COMPLAINT: Patient is a 89y old  Male who presents with a chief complaint of COVID19, Chest pain (24 Mar 2024 06:57)      INTERVAL EVENTS: No overnight events.     ROS: Seen by bedside during AM rounds. Unable to obtain ROS 2/2 hospital delirium vs underlying vascular dementia.     OBJECTIVE:  ICU Vital Signs Last 24 Hrs  T(C): 36.3 (25 Mar 2024 04:00), Max: 36.3 (25 Mar 2024 04:00)  T(F): 97.4 (25 Mar 2024 04:00), Max: 97.4 (25 Mar 2024 04:00)  HR: 79 (25 Mar 2024 04:00) (68 - 81)  BP: 105/43 (25 Mar 2024 04:00) (105/43 - 135/68)  BP(mean): --  ABP: --  ABP(mean): --  RR: 16 (25 Mar 2024 04:00) (16 - 21)  SpO2: 99% (25 Mar 2024 04:00) (98% - 100%)    O2 Parameters below as of 25 Mar 2024 04:00  Patient On (Oxygen Delivery Method): ventilator          Mode: AC/ CMV (Assist Control/ Continuous Mandatory Ventilation), RR (machine): 16, TV (machine): 400, FiO2: 30, PEEP: 5, MAP: 10, PIP: 36    03-24 @ 07:01  -  03-25 @ 07:00  --------------------------------------------------------  IN: 1700 mL / OUT: 1370 mL / NET: 330 mL      CAPILLARY BLOOD GLUCOSE      POCT Blood Glucose.: 160 mg/dL (25 Mar 2024 06:17)      PHYSICAL EXAM:  General: NAD  Neck: trach to ventilator, site is c/d/i   Respiratory: coarse breath sounds b/l, no rales or wheezing   Cardiovascular: normal s1 s2, reg rate and rhythm  Abdomen: soft, non tender, normoactive bowel sounds, +PEG, +R Perc Lynsey tube  Extremities: No lower extremity edema  Skin: Warm and dry  Neurological: AAO x 0, moving all extremities freely, does not follow commands, eyes open spontaneously   Psychiatry: No agitation at present     Mode: AC/ CMV (Assist Control/ Continuous Mandatory Ventilation)  RR (machine): 16  TV (machine): 400  FiO2: 30  PEEP: 5  MAP: 10  PIP: 36      HOSPITAL MEDICATIONS:  MEDICATIONS  (STANDING):  albuterol/ipratropium for Nebulization 3 milliLiter(s) Nebulizer every 12 hours  artificial  tears Solution 1 Drop(s) Left EYE four times a day  aspirin  chewable 81 milliGRAM(s) Enteral Tube daily  carvedilol 6.25 milliGRAM(s) Oral every 12 hours  chlorhexidine 0.12% Liquid 15 milliLiter(s) Oral Mucosa every 12 hours  chlorhexidine 2% Cloths 1 Application(s) Topical daily  dextrose 5%. 1000 milliLiter(s) (100 mL/Hr) IV Continuous <Continuous>  dextrose 5%. 1000 milliLiter(s) (50 mL/Hr) IV Continuous <Continuous>  dextrose 50% Injectable 25 Gram(s) IV Push once  dextrose 50% Injectable 12.5 Gram(s) IV Push once  dextrose 50% Injectable 25 Gram(s) IV Push once  doxazosin 2 milliGRAM(s) Oral at bedtime  escitalopram Solution 10 milliGRAM(s) Oral daily  furosemide    Tablet 20 milliGRAM(s) Oral daily  glucagon  Injectable 1 milliGRAM(s) IntraMuscular once  heparin   Injectable 5000 Unit(s) SubCutaneous every 8 hours  insulin glargine Injectable (LANTUS) 14 Unit(s) SubCutaneous <User Schedule>  insulin lispro (ADMELOG) corrective regimen sliding scale   SubCutaneous every 6 hours  levETIRAcetam  Solution 250 milliGRAM(s) Oral two times a day  melatonin 6 milliGRAM(s) Oral at bedtime  pantoprazole   Suspension 40 milliGRAM(s) Oral every 12 hours  petrolatum Ophthalmic Ointment 1 Application(s) Left EYE at bedtime  polyethylene glycol 3350 17 Gram(s) Oral daily  QUEtiapine 75 milliGRAM(s) Oral at bedtime  QUEtiapine 25 milliGRAM(s) Oral <User Schedule>  senna Syrup 10 milliLiter(s) Oral at bedtime  sucralfate suspension 1 Gram(s) Enteral Tube two times a day  ticagrelor 60 milliGRAM(s) Oral every 12 hours  valproic  acid Syrup 125 milliGRAM(s) Oral every 8 hours    MEDICATIONS  (PRN):  acetaminophen   Oral Liquid .. 650 milliGRAM(s) Oral every 6 hours PRN Temp greater or equal to 38C (100.4F), Mild Pain (1 - 3), Moderate Pain (4 - 6)  dextrose Oral Gel 15 Gram(s) Oral once PRN Blood Glucose LESS THAN 70 milliGRAM(s)/deciliter  QUEtiapine 12.5 milliGRAM(s) Oral every 6 hours PRN agitation      LABS:                        8.8    6.31  )-----------( 391      ( 25 Mar 2024 05:40 )             27.3     03-25    141  |  100  |  46<H>  ----------------------------<  122<H>  4.3   |  30  |  1.34<H>    Ca    9.0      25 Mar 2024 05:40  Phos  3.6     03-25  Mg     2.40     03-25    TPro  7.0  /  Alb  2.9<L>  /  TBili  0.2  /  DBili  <0.2  /  AST  18  /  ALT  11  /  AlkPhos  108  03-25      Urinalysis Basic - ( 25 Mar 2024 05:40 )    Color: x / Appearance: x / SG: x / pH: x  Gluc: 122 mg/dL / Ketone: x  / Bili: x / Urobili: x   Blood: x / Protein: x / Nitrite: x   Leuk Esterase: x / RBC: x / WBC x   Sq Epi: x / Non Sq Epi: x / Bacteria: x        Venous Blood Gas:  03-25 @ 05:40  7.46/45/43/32/69.7  VBG Lactate: --

## 2024-03-25 NOTE — BH CONSULTATION LIAISON PROGRESS NOTE - NSBHCHARTREVIEWVS_PSY_A_CORE FT
Vital Signs Last 24 Hrs  T(C): 36.8 (20 Mar 2024 16:00), Max: 36.9 (20 Mar 2024 00:00)  T(F): 98.2 (20 Mar 2024 16:00), Max: 98.5 (20 Mar 2024 08:00)  HR: 87 (20 Mar 2024 16:00) (76 - 95)  BP: 112/51 (20 Mar 2024 16:00) (112/48 - 150/65)  BP(mean): --  RR: 19 (20 Mar 2024 16:00) (19 - 23)  SpO2: 100% (20 Mar 2024 16:00) (94% - 100%)    Parameters below as of 20 Mar 2024 16:00  Patient On (Oxygen Delivery Method): ventilator    O2 Concentration (%): 30
Vital Signs Last 24 Hrs  T(C): 36.9 (18 Mar 2024 08:00), Max: 36.9 (18 Mar 2024 08:00)  T(F): 98.4 (18 Mar 2024 08:00), Max: 98.4 (18 Mar 2024 08:00)  HR: 88 (18 Mar 2024 11:43) (69 - 106)  BP: 101/43 (18 Mar 2024 08:00) (101/43 - 144/57)  BP(mean): --  RR: 22 (18 Mar 2024 08:00) (14 - 22)  SpO2: 98% (18 Mar 2024 11:43) (95% - 100%)    Parameters below as of 18 Mar 2024 09:21  Patient On (Oxygen Delivery Method): ventilator    
Vital Signs Last 24 Hrs  T(C): 36.2 (22 Mar 2024 16:00), Max: 36.8 (21 Mar 2024 20:00)  T(F): 97.2 (22 Mar 2024 16:00), Max: 98.3 (22 Mar 2024 00:00)  HR: 83 (22 Mar 2024 16:00) (68 - 85)  BP: 117/46 (22 Mar 2024 16:00) (104/40 - 146/58)  BP(mean): --  RR: 16 (22 Mar 2024 16:00) (16 - 20)  SpO2: 100% (22 Mar 2024 16:00) (96% - 100%)    Parameters below as of 22 Mar 2024 16:00  Patient On (Oxygen Delivery Method): ventilator    O2 Concentration (%): 30
Vital Signs Last 24 Hrs  T(C): 37.2 (15 Mar 2024 13:18), Max: 37.2 (15 Mar 2024 13:18)  T(F): 98.9 (15 Mar 2024 13:18), Max: 98.9 (15 Mar 2024 13:18)  HR: 83 (15 Mar 2024 15:50) (79 - 87)  BP: 125/46 (15 Mar 2024 13:18) (107/58 - 125/46)  BP(mean): --  RR: 15 (15 Mar 2024 13:18) (15 - 24)  SpO2: 97% (15 Mar 2024 15:50) (95% - 100%)    Parameters below as of 15 Mar 2024 13:18  Patient On (Oxygen Delivery Method): ventilator    O2 Concentration (%): 40
Vital Signs Last 24 Hrs  T(C): 36.8 (12 Mar 2024 09:00), Max: 36.8 (12 Mar 2024 09:00)  T(F): 98.2 (12 Mar 2024 09:00), Max: 98.2 (12 Mar 2024 09:00)  HR: 95 (12 Mar 2024 11:30) (79 - 95)  BP: 113/52 (12 Mar 2024 09:00) (111/95 - 146/44)  BP(mean): 57 (12 Mar 2024 09:00) (57 - 57)  RR: 18 (12 Mar 2024 09:00) (15 - 23)  SpO2: 97% (12 Mar 2024 11:30) (95% - 100%)    Parameters below as of 12 Mar 2024 09:00  Patient On (Oxygen Delivery Method): ventilator  O2 Flow (L/min): 60  
Vital Signs Last 24 Hrs  T(C): 36.6 (14 Mar 2024 08:00), Max: 36.9 (13 Mar 2024 16:00)  T(F): 97.9 (14 Mar 2024 08:00), Max: 98.4 (13 Mar 2024 16:00)  HR: 82 (14 Mar 2024 11:00) (78 - 86)  BP: 93/38 (14 Mar 2024 08:00) (93/38 - 125/44)  BP(mean): --  RR: 20 (14 Mar 2024 08:00) (14 - 20)  SpO2: 100% (14 Mar 2024 11:00) (92% - 100%)    Parameters below as of 14 Mar 2024 08:00  Patient On (Oxygen Delivery Method): ventilator    O2 Concentration (%): 40
Vital Signs Last 24 Hrs  T(C): 36.1 (25 Mar 2024 12:00), Max: 36.3 (25 Mar 2024 04:00)  T(F): 97 (25 Mar 2024 12:00), Max: 97.4 (25 Mar 2024 04:00)  HR: 79 (25 Mar 2024 12:03) (68 - 80)  BP: 120/45 (25 Mar 2024 12:00) (105/43 - 135/68)  BP(mean): --  RR: 16 (25 Mar 2024 12:00) (16 - 21)  SpO2: 100% (25 Mar 2024 12:03) (98% - 100%)    Parameters below as of 25 Mar 2024 12:00  Patient On (Oxygen Delivery Method): ventilator, AC    
Vital Signs Last 24 Hrs  T(C): 36.7 (19 Mar 2024 08:00), Max: 36.8 (18 Mar 2024 14:00)  T(F): 98.1 (19 Mar 2024 08:00), Max: 98.3 (18 Mar 2024 14:00)  HR: 85 (19 Mar 2024 08:52) (79 - 95)  BP: 122/54 (19 Mar 2024 08:00) (106/40 - 139/55)  BP(mean): --  RR: 16 (19 Mar 2024 08:00) (16 - 24)  SpO2: 100% (19 Mar 2024 08:52) (97% - 100%)    Parameters below as of 19 Mar 2024 08:00  Patient On (Oxygen Delivery Method): ventilator    O2 Concentration (%): 30

## 2024-03-25 NOTE — BH CONSULTATION LIAISON PROGRESS NOTE - NSBHATTESTBILLING_PSY_A_CORE
77974-Grqwynevon OBS or IP - moderate complexity OR 35-49 mins
[id number]-Kxmszqzahk OBS or IP - moderate complexity OR 35-49 mins
Non-billable
[id number]-Aezjzeheqi OBS or IP - moderate complexity OR 35-49 mins
[id number]-Oqfzsmtqbm OBS or IP - moderate complexity OR 35-49 mins
[id number]-Rbhdmqpqsy OBS or IP - moderate complexity OR 35-49 mins
[id number]-Mxvxpztpbd OBS or IP - moderate complexity OR 35-49 mins
[id number]-Dvlvvkbrao OBS or IP - moderate complexity OR 35-49 mins

## 2024-03-25 NOTE — BH CONSULTATION LIAISON PROGRESS NOTE - NSBHATTESTTYPEVISIT_PSY_A_CORE
Attending Only
Attending Only
Attending with Resident/Fellow/Student
Attending with Resident/Fellow/Student
Resident/Fellow with telephonic supervision
Attending with Resident/Fellow/Student

## 2024-03-25 NOTE — CHART NOTE - NSCHARTNOTEFT_GEN_A_CORE
89 year old male with history of acute cholecystitis s/p cholecystostomy tube on 1/26/24. IR consulted due to sanguineous output in drainage bag. Tube was flushed with 10 cc of saline. Site is c/d/i. Sutures intact. CT abdomen was obtained on 3/24 demonstrating cholecystostomy tube within a decompressed gallbladder.     Plan:  - No need for intervention at this time, continue to gravity drainage  - Flush cholecystostomy tube with 5-10ccs NS qd   - If no surgical intervention planned, patient will require routine 3 months exchanges    [id number] MAL GARLAND

## 2024-03-25 NOTE — BH CONSULTATION LIAISON PROGRESS NOTE - NSBHCONSULTFOLLOWAFTERCARE_PSY_A_CORE FT
[facility]  [address] [place] [address]  Phone: [phone]  Walk Ins: CHOCO 9am-3pm
[facility]  [address] [place] [address]  Phone: [phone]  Walk Ins: CHOCO 9am-3pm
[facility]  [address] [place] [address]  Phone: [phone]  Walk Ins: CHOCO 9am-3pm
[facility]  [address] [place] [address]  Phone: [phone]  Walk Ins: CHOCO 9am-3pm
[facility]  [address] [place] [address]  Phone: [phone]  Walk Ins: CHOCO 9am-3pm
[facility]  [address] [place] [address]  Phone: [phone]  Walk Ins: CHOCO 9am-3pm
[facility]  [address] [address]  Phone: [phone]  Walk Ins: CHOCO 9am-3pm
[facility]  [address] [place] [address]  Phone: [phone]  Walk Ins: CHOCO 9am-3pm

## 2024-03-25 NOTE — BH CONSULTATION LIAISON PROGRESS NOTE - NSBHCHARTREVIEWLAB_PSY_A_CORE FT
Hepatic Function Panel in AM (03.12.24 @ 05:30)   Indirect Reacting Bilirubin: >0.1 mg/dL  Protein Total: 7.2 g/dL  Albumin: 3.0 g/dL  Bilirubin Total: 0.3 mg/dL  Bilirubin Direct: <0.2 mg/dL  Alkaline Phosphatase: 176 U/L  Aspartate Aminotransferase (AST/SGOT): 22 U/L  Alanine Aminotransferase (ALT/SGPT): 19 U/L                           8.0    8.32  )-----------( 340      ( 12 Mar 2024 05:30 )             25.5     03-12    137  |  97<L>  |  50<H>  ----------------------------<  190<H>  4.7   |  30  |  1.50<H>    Ca    9.0      12 Mar 2024 05:30  Phos  3.8     03-12  Mg     2.50     03-12    TPro  7.2  /  Alb  3.0<L>  /  TBili  0.3  /  DBili  <0.2  /  AST  22  /  ALT  19  /  AlkPhos  176<H>  03-12    Urinalysis Basic - ( 12 Mar 2024 05:30 )    Color: x / Appearance: x / SG: x / pH: x  Gluc: 190 mg/dL / Ketone: x  / Bili: x / Urobili: x   Blood: x / Protein: x / Nitrite: x   Leuk Esterase: x / RBC: x / WBC x   Sq Epi: x / Non Sq Epi: x / Bacteria: x    
 VPA level 28.3                        8.3    7.49  )-----------( 293      ( 14 Mar 2024 05:20 )             26.0     03-15    136  |  96<L>  |  45<H>  ----------------------------<  163<H>  4.6   |  28  |  1.34<H>    Ca    8.8      15 Mar 2024 05:30  Phos  3.4     03-15  Mg     2.40     03-15    TPro  7.2  /  Alb  3.0<L>  /  TBili  0.3  /  DBili  x   /  AST  21  /  ALT  17  /  AlkPhos  173<H>  03-15    Urinalysis Basic - ( 15 Mar 2024 05:30 )    Color: x / Appearance: x / SG: x / pH: x  Gluc: 163 mg/dL / Ketone: x  / Bili: x / Urobili: x   Blood: x / Protein: x / Nitrite: x   Leuk Esterase: x / RBC: x / WBC x   Sq Epi: x / Non Sq Epi: x / Bacteria: x    
VPA level 27.3  ammonia 38                        8.7    5.22  )-----------( 278      ( 18 Mar 2024 05:09 )             28.0     03-18    144  |  102  |  40<H>  ----------------------------<  105<H>  4.5   |  29  |  1.25    Ca    8.8      18 Mar 2024 05:09  Phos  3.2     03-18  Mg     2.30     03-18    TPro  6.8  /  Alb  2.7<L>  /  TBili  0.3  /  DBili  x   /  AST  18  /  ALT  12  /  AlkPhos  125<H>  03-17    Urinalysis Basic - ( 18 Mar 2024 05:09 )    Color: x / Appearance: x / SG: x / pH: x  Gluc: 105 mg/dL / Ketone: x  / Bili: x / Urobili: x   Blood: x / Protein: x / Nitrite: x   Leuk Esterase: x / RBC: x / WBC x   Sq Epi: x / Non Sq Epi: x / Bacteria: x    
                      8.4    7.46  )-----------( 313      ( 22 Mar 2024 05:58 )             25.9   03-22    141  |  101  |  42<H>  ----------------------------<  113<H>  4.4   |  31  |  1.26    Ca    8.8      22 Mar 2024 05:58  Phos  3.6     03-22  Mg     2.30     03-22    TPro  7.0  /  Alb  2.9<L>  /  TBili  0.2  /  DBili  x   /  AST  19  /  ALT  11  /  AlkPhos  115  03-22  
                          8.3    7.49  )-----------( 293      ( 14 Mar 2024 05:20 )             26.0     03-14    138  |  97<L>  |  49<H>  ----------------------------<  154<H>  4.3   |  30  |  1.45<H>    Ca    8.4      14 Mar 2024 05:20  Phos  3.4     03-14  Mg     2.40     03-14    TPro  6.7  /  Alb  2.6<L>  /  TBili  0.3  /  DBili  <0.2  /  AST  21  /  ALT  18  /  AlkPhos  149<H>  03-13    Urinalysis Basic - ( 14 Mar 2024 05:20 )    Color: x / Appearance: x / SG: x / pH: x  Gluc: 154 mg/dL / Ketone: x  / Bili: x / Urobili: x   Blood: x / Protein: x / Nitrite: x   Leuk Esterase: x / RBC: x / WBC x   Sq Epi: x / Non Sq Epi: x / Bacteria: x    
                      8.3    7.30  )-----------( 297      ( 20 Mar 2024 06:00 )             25.9     03-20    141  |  100  |  39<H>  ----------------------------<  110<H>  4.6   |  28  |  1.21    Ca    8.8      20 Mar 2024 06:00  Phos  3.1     03-20  Mg     2.20     03-20    TPro  7.3  /  Alb  3.2<L>  /  TBili  0.2  /  DBili  x   /  AST  26  /  ALT  17  /  AlkPhos  112  03-20    Urinalysis Basic - ( 20 Mar 2024 06:00 )    Color: x / Appearance: x / SG: x / pH: x  Gluc: 110 mg/dL / Ketone: x  / Bili: x / Urobili: x   Blood: x / Protein: x / Nitrite: x   Leuk Esterase: x / RBC: x / WBC x   Sq Epi: x / Non Sq Epi: x / Bacteria: x    
                      8.4    7.46  )-----------( 313      ( 22 Mar 2024 05:58 )             25.9   03-22    141  |  101  |  42<H>  ----------------------------<  113<H>  4.4   |  31  |  1.26    Ca    8.8      22 Mar 2024 05:58  Phos  3.6     03-22  Mg     2.30     03-22    TPro  7.0  /  Alb  2.9<L>  /  TBili  0.2  /  DBili  x   /  AST  19  /  ALT  11  /  AlkPhos  115  03-22  
VPA level 27.3  ammonia 38                        8.7    5.22  )-----------( 278      ( 18 Mar 2024 05:09 )             28.0     03-18    144  |  102  |  40<H>  ----------------------------<  105<H>  4.5   |  29  |  1.25    Ca    8.8      18 Mar 2024 05:09  Phos  3.2     03-18  Mg     2.30     03-18    TPro  6.8  /  Alb  2.7<L>  /  TBili  0.3  /  DBili  x   /  AST  18  /  ALT  12  /  AlkPhos  125<H>  03-17    Urinalysis Basic - ( 18 Mar 2024 05:09 )    Color: x / Appearance: x / SG: x / pH: x  Gluc: 105 mg/dL / Ketone: x  / Bili: x / Urobili: x   Blood: x / Protein: x / Nitrite: x   Leuk Esterase: x / RBC: x / WBC x   Sq Epi: x / Non Sq Epi: x / Bacteria: x

## 2024-03-25 NOTE — PROGRESS NOTE ADULT - SUBJECTIVE AND OBJECTIVE BOX
Aashish Boyer MD  Interventional Cardiology / Endovascular Specialist  [place] Office : [address] [place] [address] Tel:   [place] Office : [address] [facility] [address] [address]  Tel: [phone]      Subjective/Overnight events: Patient lying in bed. No acute distress.  	  MEDICATIONS:  aspirin  chewable 81 milliGRAM(s) Enteral Tube daily  carvedilol 6.25 milliGRAM(s) Oral every 12 hours  doxazosin 2 milliGRAM(s) Oral at bedtime  furosemide    Tablet 20 milliGRAM(s) Oral daily  heparin   Injectable 5000 Unit(s) SubCutaneous every 8 hours  ticagrelor 60 milliGRAM(s) Oral every 12 hours      albuterol/ipratropium for Nebulization 3 milliLiter(s) Nebulizer every 12 hours    acetaminophen   Oral Liquid .. 650 milliGRAM(s) Oral every 6 hours PRN  escitalopram Solution 10 milliGRAM(s) Oral daily  levETIRAcetam  Solution 250 milliGRAM(s) Oral two times a day  LORazepam     Tablet 0.25 milliGRAM(s) Oral every 6 hours PRN  melatonin 6 milliGRAM(s) Oral at bedtime  QUEtiapine 75 milliGRAM(s) Oral at bedtime  QUEtiapine 12.5 milliGRAM(s) Oral every 6 hours PRN  valproic  acid Syrup 125 milliGRAM(s) Oral every 8 hours    pantoprazole   Suspension 40 milliGRAM(s) Oral every 12 hours  polyethylene glycol 3350 17 Gram(s) Oral daily  senna Syrup 10 milliLiter(s) Oral at bedtime  sucralfate suspension 1 Gram(s) Enteral Tube two times a day    dextrose 50% Injectable 25 Gram(s) IV Push once  dextrose 50% Injectable 12.5 Gram(s) IV Push once  dextrose 50% Injectable 25 Gram(s) IV Push once  dextrose Oral Gel 15 Gram(s) Oral once PRN  glucagon  Injectable 1 milliGRAM(s) IntraMuscular once  insulin glargine Injectable (LANTUS) 14 Unit(s) SubCutaneous <User Schedule>  insulin lispro (ADMELOG) corrective regimen sliding scale   SubCutaneous every 6 hours    artificial  tears Solution 1 Drop(s) Left EYE four times a day  chlorhexidine 0.12% Liquid 15 milliLiter(s) Oral Mucosa every 12 hours  chlorhexidine 2% Cloths 1 Application(s) Topical daily  dextrose 5%. 1000 milliLiter(s) IV Continuous <Continuous>  dextrose 5%. 1000 milliLiter(s) IV Continuous <Continuous>  petrolatum Ophthalmic Ointment 1 Application(s) Left EYE at bedtime      PAST MEDICAL/SURGICAL HISTORY  PAST MEDICAL & SURGICAL HISTORY:  Hyperlipemia      Hypertension      Coronary Artery Disease      Diabetes Mellitus Type II      Stented Coronary Artery  total 5 stents, last stent 5/2019      Neuropathy      Myocardial infarction      Stroke  mild left facial numbness   no other residuals verbalized      Myoclonic jerking      Stage 3 chronic kidney disease      History of Cataract Extraction      Hx of CABG      H/O coronary angiogram      S/P coronary artery stent placement  1/6/09      S/P placement of cardiac pacemaker          SOCIAL HISTORY: Substance Use (street drugs): ( x ) never used  (  ) other:    FAMILY HISTORY:  No pertinent family history in first degree relatives        PHYSICAL EXAM:  T(C): 36.3 (03-25-24 @ 08:00), Max: 36.3 (03-25-24 @ 04:00)  HR: 79 (03-25-24 @ 12:03) (68 - 80)  BP: 113/46 (03-25-24 @ 08:00) (105/43 - 135/68)  RR: 16 (03-25-24 @ 08:00) (16 - 21)  SpO2: 100% (03-25-24 @ 12:03) (98% - 100%)  Wt(kg): --  I&O's Summary    24 Mar 2024 07:01  -  25 Mar 2024 07:00  --------------------------------------------------------  IN: 1700 mL / OUT: 1370 mL / NET: 330 mL          GENERAL: s/p trach  EYES: conjunctiva and sclera clear  ENMT:   Moist mucous membranes, Good dentition, No lesions  Cardiovascular: Normal S1 S2, No JVD, No murmurs, No edema  Respiratory: Lungs clear to auscultation	  Gastrointestinal:  s/p PEG   Extremities: no edema                                          8.8    6.31  )-----------( 391      ( 25 Mar 2024 05:40 )             27.3     03-25    141  |  100  |  46<H>  ----------------------------<  122<H>  4.3   |  30  |  1.34<H>    Ca    9.0      25 Mar 2024 05:40  Phos  3.6     03-25  Mg     2.40     03-25    TPro  7.0  /  Alb  2.9<L>  /  TBili  0.2  /  DBili  <0.2  /  AST  18  /  ALT  11  /  AlkPhos  108  03-25    proBNP:   Lipid Profile:   HgA1c:   TSH:     Consultant(s) Notes Reviewed:  [x ] YES  [ ] NO    Care Discussed with Consultants/Other Providers [ x] YES  [ ] NO    Imaging Personally Reviewed independently:  [x] YES  [ ] NO    All labs, radiologic studies, vitals, orders and medications list reviewed. Patient is seen and examined at bedside. Case discussed with medical team.

## 2024-03-25 NOTE — BH CONSULTATION LIAISON PROGRESS NOTE - NSBHASSESSMENTFT_PSY_ALL_CORE
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 Course ultimately complicated by progressive WOB and O2 demand and patient intubated and transferred to MICU (1/22). Course further complicated by acute cholecystitis and s/p Perc Lynsey with IR on (1/26), HFrEF 38, pulmonary edema, superimposed PNA, failed extubation failed and prolonged vent time and s/p trach (2/13). Patient transferred to RCU (2/16) and s/p PEG (2/20). Course complicated by volume overload and diuresis and GDMT continued and HFrEF 45 with improvement.  No known psychiatric history. Psychiatry consulted for agitation. He has been receiving PO and IM ativan several times per day, concern for oversedation. Son and DIL are physicians and requested consult.     Due to multiple medical comorbities and prolonged QTc, would hold off on antipsychotics and other QT prolonging agents.   Ativan is not an ideal PRN for this patient given deliriogenic effects and paradoxical activation.  Discussed with family plan to start low dose depakote for impulsivity and agitation and uptitrated as tolerated.    3/12: received ativan x2 overnight for ongoing agitation. LFTs wnl. Will continue to increase depakote and monitor. Would recommend cross tapering with keppra as keppra may be contributing to agitated delirium. Discussed with family.   3/14: some improvement in agitation with depakote, repeat EKG with QTc <500, still proceed with caution with antipsychotics, though antipsychotics are preferable to benzos for agitation in delirium  3/15: still requiring PRNs, appears to respond better to ativan than to olanzapine   - Depakote decreased to 125mg TID in response to hyperammonemia  3/18: ammonia improved, remains agitated requiring PRNs. Now that EKG has improved, can start standing seroquel for agitation and titrate as tolerated, monitor QTc.   3/19: started seroquel 25/50, no PRNs required overnight. Appears to be helping with agitation and delirium. Not obtunded on exam today.   3/20: awake and agitated, not sleeping, delirious.  Can add PRN seroquel 12.5mg q6h PRN for agitation  ALSO INCREASE standing seroquel to 25mg qd, 75mg qhs  3/22: Appears calm. Has not required PRN. Will not make changes to plan below. Call with questions.    3/25: continues to struggle with intermittent agitation. at this point, defer to primary RCU team for management of agitation 2/2 to delirium. Please feel free to call us with questions.    Plan:  - quetiapine 25mg @ 8am and 75mg qhs for delirium and agitation, hold for QTc > 500  	[] RCU team increasing AM dose to 50mg- can certainly try this to see if helps  - monitor EKG  - continue using ativan 0.25mg q4h PRN for severe agitation per RCU  - continue with depakote 125mg TID for impulsivity and agitation  	[] please check hepatic panel (including LFTs, albumin), if VPA is elevated when adjusting for low albumin, please page psych CL for further recs   - defer taper of keppra to primary team  - delirium precautions   - monitor LFTs, platelets, ammonia, albumin VPA level  [] of note, his VPA level will need to be adjusted for low albumin- please indicate this on DC summary

## 2024-03-25 NOTE — PROGRESS NOTE ADULT - ASSESSMENT
IMPRESSION:  89M w/ DM2, HTN, CVA, PAD, CKD3, and CAD-CABG, 12/23/23 p/w COVID19 infection, c/b respiratory failure/hypotension; now s/p tracheostomy    (1)Renal - CKD3; superimposed mild prerenal azotemia - numbers fluctuating based on hemodynamic status, slightly higher   (2)CV - now off pressors and midodrine, BP improved/stable    (3)Pulm - vent-dependent   (4)ID - afebrile, s/p zosyn   (5)GI - s/p PEG (2/20)  (6)Hypernatremia - possibly due to diuresis, Na+ improved    RECOMMEND:   (1)a/w lasix 20 mg iv  PRN   (2)flush PEG as needed   (3)Continue meds for GFR 40-50ml/min  (4)last  aranesp 60 mcg Sq x 1 on  3/18/24          [facility]  Office: [phone]       IMPRESSION:  89M w/ DM2, HTN, CVA, PAD, CKD3, and CAD-CABG, 12/23/23 p/w COVID19 infection, c/b respiratory failure/hypotension; now s/p tracheostomy    (1)Renal - CKD3; superimposed mild prerenal azotemia - numbers fluctuating based on hemodynamic status, slightly higher   (2)CV - now off pressors and midodrine, BP improved/stable    (3)Pulm - vent-dependent   (4)ID - afebrile, s/p zosyn   (5)GI - s/p PEG (2/20)  (6)Hypernatremia - possibly due to diuresis, Na+ improved    RECOMMEND:   (1)a/w lasix 20 mg iv  PRN   (2)flush PEG as needed   (3)Continue meds for GFR 40-50ml/min  (4)last  aranesp 60 mcg Sq x 1 on  3/18/24, schedule aranesp  today           [facility]  Office: [phone]

## 2024-03-25 NOTE — BH CONSULTATION LIAISON PROGRESS NOTE - NSBHCONSULTWORKUPEKGFT_PSY_ALL_CORE
repeat EKG today, monitor QTc

## 2024-03-25 NOTE — CHART NOTE - NSCHARTNOTESELECT_GEN_ALL_CORE
EEG Prelim
Event Note
Follow Up/Nutrition Services
Follow-Up/Nutrition Services
GI/Event Note
IR Resident/Event Note
IR preprocedure note/Event Note
MEdicine ACP/Event Note
MICU Accept/Event Note
Neurology
Nutrition Follow Up/Nutrition Services
Ophthalmology/Event Note
POCUS Note
POCUS Pulmonary
POCUS/Event Note
SICU transfer
SICU transfer note/Transfer Note
Transfer Note
post-op check/Event Note
Behavioral Health Psychiatry BH/Event Note
EEG Prelim
Event Note
Follow Up/Nutrition Services
Follow Up/Nutrition Services
GI/Event Note
IP pocus note
IR follow up note/Off Service Note
Interventional Radiology/Event Note
Nutrition Follow Up/Nutrition Services
Nutrition Follow Up/Nutrition Services
POCUS/Event Note
POCUS/Event Note
RCU ACCEPTANCE NOTE/Event Note
Trach suture removal/Event Note
Vascular Surgery
Vascular surgery
pocus/Event Note
psychiatry/Event Note
Event Note
Event Note
post op check
vascular surgery

## 2024-03-25 NOTE — PROGRESS NOTE ADULT - ASSESSMENT
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 Course ultimately complicated by progressive WOB and O2 demand and patient intubated and transferred to MICU (1/22). Course further complicated by acute cholecystitis and s/p Perc Lynsey with IR on (1/26), HFrEF 38, pulmonary edema, superimposed PNA, failed extubation and prolonged vent time and s/p trach (2/13). Patient transferred to RCU (2/16) and s/p PEG (2/20). Course complicated by volume overload and diuresis and GDMT continued and HFrEF 45 with improvement. Course further complicated by agitation from underlying vascular dementia/ hospital delirium.     NEUROLOGY   # Encephalopathy   - Hx of CVA with no residual deficits per family, however per family worsening confusion at home over the past 6 months (becoming disoriented to time) but prior to admission has been more confused, talking about seeing people that are not present.  - CTH (1/31) with no evidence of acute infarct  - Continue on ASA and Brilinta  - Holding Neurontin, Memantine and Oxycodone in setting of somnolence  - 3/17: obtunded overnight, CT Head: Mild chronic microvascular changes without evidence of an acute transcortical infarction or hemorrhage.  - VBG and Ammonia level grossly unremarkable    # ICA stenosis   - CTA NECK (1/31) with moderate to severe narrowing left internal carotid at the origin. Severe narrowing right internal carotid by a tandem lesion 1.5 cm above the bifurcation with a narrowed internal carotid beyond the lesion. Extensive calcified plaque both cavernous and supraclinoid carotid arteries with narrowing but no occlusion. Mild narrowing proximal right M1 segment. Moderate narrowing both vertebral V4 segments. No perfusion abnormality at the Tmax 6 second threshold, but moderate hypoperfusion in the right MCA territory at the 4 second threshold.   - Continue on ASA and Brilinta    # Myoclonic jerks   - Hx of myoclonic jerks post CVAs   - EEG (1/18-2/6) negative for seizures  - Continue on Keppra and per neuro/ psych wishes to wean, family in agreement   - Currently down titrating Keppra 500 mg BID, now Keppra 250 mg BID 3/13 - )    # Depression/ Insomnia  - Continue on Lexapro per home medication   - Course complicated by agitation and pulling on tubes   - Home Seroquel discontinued as patient now on Valproic acid syrup   - S/p Ativan 0.5mg PO Q6H PRN and Haldol 0.25 mg Q6H for severe agitation, then s/p IM Zypexa 1.25 Q8H PRN for agitation    - Supportive care     # Agitation in setting of delirium / underlying vascular dementia   - C/w  mg TID, BH following   - monitor platelets, LFTs while on Depakote   - Recently, Pt had responded much better to Ativan than Zyprexa for agitation, however became obtunded, so Ativan d/c Ativan 0.5mg Q6H PRN  - 3/18 Start Seroquel 25 mg in AM and Seroquel 50 mg QHS   - 3/20 restless /agitated overnight Ativan x 2 no effect - DW psych can do Seroquel 12.5 PRN/ increase night Seroquel to 75mg - continues on 1:1 restless with  family  - 3/21-3/22 Calm, remains delirious no recent episodes of agitation   - Became agitated with minimal response to Seroquel and required Ativan with improvement (overnight 3/22)    CARDIOLOGY   # Vasoplegic vs septic shock  # Hx of HTN   - Weaned off pressors in ICU and now with mild hypertension   - Continue on coreg and hydral as below   - Strict BP control given moderate AS  - Increased vascular congestion on CXR (3/9) so will give additional Lasix 20mg PO x1 (3/10)  - Diastolic bp low <50 hold Hydralazine and monitor bp - may need to decrease of dc   - Diastolic hypotension noted so will hold Hydralazine (3/24)    # AFIB RVR   # Bifascicular Block with Medtronic PPM   - AFIB RVR episodes and PPM interrogated (2/20) by EP with similar episodes  - Previous admission in 6/2022 with cardiogenic syncope second to bifasicular block, however now with PPM and AV myron blockers ok.   - Continue on lopressor 12.5mg BID however replaced with coreg second to HFrEF   - AC discussed at length however with recent GIB will hold for now     # HFrEF 38 likely stress induced?   # Mild to moderate AS   - ECHO (12/2023) with EF 62 with normal LVSF and mild to moderate AS   - ECHO (2/2024) with EF 38, moderate LVSD with global LV hypokinesis, mildly reduced RVSF (TAPSE 1.3), mild to moderate MR, mild AR, and moderate AS.   - RPT ECHO (3/4) with EF 45 and mild to moderate LVSD   - Continue on Lasix 20 QD, coreg 6.25 and hydralazine 50 (increased 3/4)   - Hold isordil and up titrate above medications first   - Hold Hydralazine given diastolic hypotension (3/24)    # CAD with CABG   - CATH (5/2022) with dLAD 70, SVG graft to OM1 with 90 in stent stenosis s/p PCI and GEMMA placement, and LIMA graft to mLAD with no disease.   - Continue on ASA and Brilinta    # Prolonged QTC (Resolved)  - ECG (3/2) with QTC 616ms (corrected using bazzet 490ms)   - ECG (3/3) with QTC 634ms (corrected using bazzet 490ms)   - ECG (3/11) with QTC 490ms, EKG (3/15) QTc 498ms, EKG (3/21) 486ms  - Monitor QTc/ daily EKGs       RESPIRATORY   # Acute respiratory failure second to SARS-COV-2 vs pulm edema vs PNA  - Presented with COVID complicated by sepsis second to acute lynsey and worsening respiratory failure second to pulmonary edema requiring intubation.   - Prolonged intubation and s/p tracheostomy (2/13)   - CT CHEST 1/16 with new small bilateral pleural effusions/ atelectasis/ patchy bilateral ground-glass opacities are consistent with pulmonary edema.  - Completed ABX and COVID regimen as below   - Continue on vent and allow SBT as tolerated   - Continue on nebs and hold further HTS given intermittent hemoptysis  - Continue on diuresis as above    # Mild hemoptysis likely from suction trauma   - s/p bronch (2/18) with no active bleed  - Hemoptysis improved with TXA and holding further HTS as above   - Tiny hemoptysis second to suction trauma imporving  - Careful with suctioning   - Recurrent hemoptysis (3/9) so ordered for TXA nebs again however hemoptysis appeared self limited and stopped before nebs even given    HEENT   # L eye subconjunctival hemorrhage   - Continue on artifical tears and Lacrilube   - Optho eval appreciated     GI  # Nutrition/ Dysphagia   - PEG placed by GI on 2/20 and tube feeds continued.   - Loose stools and Banatrol continued     # UGIB   - EGD (1/2) with esophagitis and bleeding Dieulafoy's lesion s/p clips.   - Continue on PPI and carafate     # SMA calcification   - CTA demonstrating mesenteric fat stranding associated with ascending/transverse colon  - Diagnostic laparoscopy (12/24) with small bowel and visible colon viable, some inflammation of omentum in RUQ  - SMA Angiogram and stent placed (12/25).   - Continue on ASA and Brilinta     # Acute Cholecystitis   - CT (12/26) with distended GB with cholelithiasis and sludge   - HIDA (12/29) POSITIVE for acute cholecystitis   - Case discussed with IR at length and s/p PERC LYNSEY (1/26)   - Completed zosyn course (12/24-12/31)   - PERC LYNSEY tube to stay in until surgical candidate for cholecystectomy to prevent recurrence   - Noted with some bloody perc lynsey output so IR consulted, recommending CT A/P to assess if still in proper position    / RENAL   # CKD   - CRE at baseline   - Monitor renal function and UOP   - Aranesp SQ x1 (3/8)    # Hypernatremia (resolved)   - s/p  Q4H  - Monitor closely with diuresis as above   - Stop FWF (3/15) given c/f worsening vol overload     INFECTIOUS DISEASE   # COVID with superimposed PNA   # ESBL Kleb PNA   - COVID POSITIVE (12/23) and completed remdesivir x 1 dose and paxlovid course.   - WOB worsened as above requiring intubation and cultures negative. Zosyn completed empirically however hypothermic (2/11) and BCx, UA, RVP and BAL negative.   - Completed additional zosyn (2/12-2/19) empirically   - Fever spike and thick secretions and SCX (2/26) with ESBL klebs.   - s/p Zosyn (2/27 - 2/29) but resistant and completed Erta (2/29 - 3/6)     HEME  # AOCD   - Monitor HH   - DVT PPX with HSQ     ENDOCRINE   # DM2 A1C 9.3   - Continue on Lantus 14 and ISS     SKIN/ TUBES   - Trach (2/13)   - PEG (2/20)   - PERC LYNSEY (1/26 - )     ETHICS   - FULL CODE     DISPO - vent facility and family aware. SW with accepting facility however pending available bed.  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 Course ultimately complicated by progressive WOB and O2 demand and patient intubated and transferred to MICU (1/22). Course further complicated by acute cholecystitis and s/p Perc Lynsey with IR on (1/26), HFrEF 38, pulmonary edema, superimposed PNA, failed extubation and prolonged vent time and s/p trach (2/13). Patient transferred to RCU (2/16) and s/p PEG (2/20). Course complicated by volume overload and diuresis and GDMT continued and HFrEF 45 with improvement. Course further complicated by agitation from underlying vascular dementia/ hospital delirium.     NEUROLOGY   # Encephalopathy   - Hx of CVA with no residual deficits per family, however per family worsening confusion at home over the past 6 months (becoming disoriented to time) but prior to admission has been more confused, talking about seeing people that are not present.  - CTH (1/31) with no evidence of acute infarct  - Continue on ASA and Brilinta  - Holding Neurontin, Memantine and Oxycodone in setting of somnolence  - 3/17: obtunded overnight, CT Head: Mild chronic microvascular changes without evidence of an acute transcortical infarction or hemorrhage.  - VBG and Ammonia level grossly unremarkable    # ICA stenosis   - CTA NECK (1/31) with moderate to severe narrowing left internal carotid at the origin. Severe narrowing right internal carotid by a tandem lesion 1.5 cm above the bifurcation with a narrowed internal carotid beyond the lesion. Extensive calcified plaque both cavernous and supraclinoid carotid arteries with narrowing but no occlusion. Mild narrowing proximal right M1 segment. Moderate narrowing both vertebral V4 segments. No perfusion abnormality at the Tmax 6 second threshold, but moderate hypoperfusion in the right MCA territory at the 4 second threshold.   - Continue on ASA and Brilinta    # Myoclonic jerks   - Hx of myoclonic jerks post CVAs   - EEG (1/18-2/6) negative for seizures  - Continue on Keppra and per neuro/ psych wishes to wean, family in agreement   - Currently down titrating Keppra 500 mg BID, now Keppra 250 mg BID 3/13 - )    # Depression/ Insomnia  - Continue on Lexapro per home medication   - Course complicated by agitation and pulling on tubes   - Home Seroquel discontinued as patient now on Valproic acid syrup   - S/p Ativan 0.5mg PO Q6H PRN and Haldol 0.25 mg Q6H for severe agitation, then s/p IM Zypexa 1.25 Q8H PRN for agitation    - Supportive care     # Agitation in setting of delirium / underlying vascular dementia   - C/w  mg TID, BH following   - monitor platelets, LFTs while on Depakote   - Recently, Pt had responded much better to Ativan than Zyprexa for agitation, however became obtunded, so Ativan d/c Ativan 0.5mg Q6H PRN  - 3/18 Start Seroquel 25 mg in AM and Seroquel 50 mg QHS   - 3/20 restless /agitated overnight Ativan x 2 no effect - DW psych can do Seroquel 12.5 PRN/ increase night Seroquel to 75mg - continues on 1:1 restless with  family  - 3/21-3/22 Calm, remains delirious no recent episodes of agitation   - Became agitated with minimal response to Seroquel and required Ativan with improvement (overnight 3/22)  - 3/25 Increased AM Seroquel to 50 mg and c/w Seroquel 75 mg QHS     CARDIOLOGY   # Vasoplegic vs septic shock  # Hx of HTN   - Weaned off pressors in ICU and now with mild hypertension   - Continue on coreg and hydral as below   - Strict BP control given moderate AS  - Increased vascular congestion on CXR (3/9) so will give additional Lasix 20mg PO x1 (3/10)  - Diastolic bp low <50 hold Hydralazine and monitor bp - may need to decrease of dc   - Diastolic hypotension, Hydralazine discontinued (3/24)    # AFIB RVR   # Bifascicular Block with Medtronic PPM   - AFIB RVR episodes and PPM interrogated (2/20) by EP with similar episodes  - Previous admission in 6/2022 with cardiogenic syncope second to bifasicular block, however now with PPM and AV myron blockers ok.   - Continue on lopressor 12.5mg BID however replaced with coreg second to HFrEF   - AC discussed at length however with recent GIB will hold for now     # HFrEF 38 likely stress induced?   # Mild to moderate AS   - ECHO (12/2023) with EF 62 with normal LVSF and mild to moderate AS   - ECHO (2/2024) with EF 38, moderate LVSD with global LV hypokinesis, mildly reduced RVSF (TAPSE 1.3), mild to moderate MR, mild AR, and moderate AS.   - RPT ECHO (3/4) with EF 45 and mild to moderate LVSD   - Continue on Lasix 20 QD, coreg 6.25 and hydralazine 50 (increased 3/4)   - Hold isordil and up titrate above medications first   - Hydralazine discontinued given diastolic hypotension (3/24)    # CAD with CABG   - CATH (5/2022) with dLAD 70, SVG graft to OM1 with 90 in stent stenosis s/p PCI and GEMMA placement, and LIMA graft to mLAD with no disease.   - Continue on ASA and Brilinta    # Prolonged QTC (Resolved)  - ECG (3/2) with QTC 616ms (corrected using bazzet 490ms)   - ECG (3/3) with QTC 634ms (corrected using bazzet 490ms)   - ECG (3/11) with QTC 490ms, EKG (3/15) QTc 498ms, EKG (3/21) 486ms  - Monitor QTc/ daily EKGs       RESPIRATORY   # Acute respiratory failure second to SARS-COV-2 vs pulm edema vs PNA  - Presented with COVID complicated by sepsis second to acute lynsey and worsening respiratory failure second to pulmonary edema requiring intubation.   - Prolonged intubation and s/p tracheostomy (2/13)   - CT CHEST 1/16 with new small bilateral pleural effusions/ atelectasis/ patchy bilateral ground-glass opacities are consistent with pulmonary edema.  - Completed ABX and COVID regimen as below   - Continue on vent and allow SBT as tolerated   - Continue on nebs and hold further HTS given intermittent hemoptysis  - Continue on diuresis as above    # Mild hemoptysis likely from suction trauma   - s/p bronch (2/18) with no active bleed  - Hemoptysis improved with TXA and holding further HTS as above   - Tiny hemoptysis second to suction trauma imporving  - Careful with suctioning   - Recurrent hemoptysis (3/9) so ordered for TXA nebs again however hemoptysis appeared self limited and stopped before nebs even given    HEENT   # L eye subconjunctival hemorrhage   - Continue on artifical tears and Lacrilube   - Optho eval appreciated     GI  # Nutrition/ Dysphagia   - PEG placed by GI on 2/20 and tube feeds continued.   - Loose stools and Banatrol continued     # UGIB   - EGD (1/2) with esophagitis and bleeding Dieulafoy's lesion s/p clips.   - Continue on PPI and carafate     # SMA calcification   - CTA demonstrating mesenteric fat stranding associated with ascending/transverse colon  - Diagnostic laparoscopy (12/24) with small bowel and visible colon viable, some inflammation of omentum in RUQ  - SMA Angiogram and stent placed (12/25).   - Continue on ASA and Brilinta     # Acute Cholecystitis   - CT (12/26) with distended GB with cholelithiasis and sludge   - HIDA (12/29) POSITIVE for acute cholecystitis   - Case discussed with IR at length and s/p PERC LYNSEY (1/26)   - Completed zosyn course (12/24-12/31)   - PERC LYNSEY tube to stay in until surgical candidate for cholecystectomy to prevent recurrence   - Noted with some bloody perc lynsey output so IR consulted, recommending CT A/P to assess if still in proper position  - 3/24 CT A/P "gallblader decompressed with per lynsey tube, mod b/l pleural effusions"    / RENAL   # CKD   - CRE at baseline   - Monitor renal function and UOP   - Aranesp SQ x1 (3/8)    # Hypernatremia (resolved)   - s/p  Q4H  - Monitor closely with diuresis as above   - Stop FWF (3/15) given c/f worsening vol overload     INFECTIOUS DISEASE   # COVID with superimposed PNA   # ESBL Kleb PNA   - COVID POSITIVE (12/23) and completed remdesivir x 1 dose and paxlovid course.   - WOB worsened as above requiring intubation and cultures negative. Zosyn completed empirically however hypothermic (2/11) and BCx, UA, RVP and BAL negative.   - Completed additional zosyn (2/12-2/19) empirically   - Fever spike and thick secretions and SCX (2/26) with ESBL klebs.   - s/p Zosyn (2/27 - 2/29) but resistant and completed Erta (2/29 - 3/6)     HEME  # AOCD   - Monitor HH   - DVT PPX with HSQ     ENDOCRINE   # DM2 A1C 9.3   - Continue on Lantus 14 and ISS     SKIN/ TUBES   - Trach (2/13)   - PEG (2/20)   - PERC LYNSEY (1/26 - )     ETHICS   - FULL CODE     DISPO - vent facility and family aware. SW with accepting facility however pending available bed.

## 2024-03-25 NOTE — BH CONSULTATION LIAISON PROGRESS NOTE - [id number]_PSY_A_CORE
12-Mar-2024 11:05
14-Mar-2024 14:05
19-Mar-2024 11:11
15-Mar-2024 11:05
18-Mar-2024 10:39
25-Mar-2024 14:02
20-Mar-2024 16:21
22-Mar-2024 16:40

## 2024-03-26 LAB
ALBUMIN SERPL ELPH-MCNC: 3 G/DL — LOW (ref 3.3–5)
ALP SERPL-CCNC: 105 U/L — SIGNIFICANT CHANGE UP (ref 40–120)
ALT FLD-CCNC: 12 U/L — SIGNIFICANT CHANGE UP (ref 4–41)
ANION GAP SERPL CALC-SCNC: 16 MMOL/L — HIGH (ref 7–14)
AST SERPL-CCNC: 19 U/L — SIGNIFICANT CHANGE UP (ref 4–40)
BILIRUB DIRECT SERPL-MCNC: <0.2 MG/DL — SIGNIFICANT CHANGE UP (ref 0–0.3)
BILIRUB INDIRECT FLD-MCNC: >0 MG/DL — SIGNIFICANT CHANGE UP (ref 0–1)
BILIRUB SERPL-MCNC: 0.2 MG/DL — SIGNIFICANT CHANGE UP (ref 0.2–1.2)
BUN SERPL-MCNC: 46 MG/DL — HIGH (ref 7–23)
CALCIUM SERPL-MCNC: 8.9 MG/DL — SIGNIFICANT CHANGE UP (ref 8.4–10.5)
CHLORIDE SERPL-SCNC: 100 MMOL/L — SIGNIFICANT CHANGE UP (ref 98–107)
CO2 SERPL-SCNC: 27 MMOL/L — SIGNIFICANT CHANGE UP (ref 22–31)
CREAT SERPL-MCNC: 1.46 MG/DL — HIGH (ref 0.5–1.3)
EGFR: 45 ML/MIN/1.73M2 — LOW
GLUCOSE BLDC GLUCOMTR-MCNC: 111 MG/DL — HIGH (ref 70–99)
GLUCOSE BLDC GLUCOMTR-MCNC: 131 MG/DL — HIGH (ref 70–99)
GLUCOSE BLDC GLUCOMTR-MCNC: 133 MG/DL — HIGH (ref 70–99)
GLUCOSE BLDC GLUCOMTR-MCNC: 151 MG/DL — HIGH (ref 70–99)
GLUCOSE BLDC GLUCOMTR-MCNC: 98 MG/DL — SIGNIFICANT CHANGE UP (ref 70–99)
GLUCOSE SERPL-MCNC: 119 MG/DL — HIGH (ref 70–99)
HCT VFR BLD CALC: 25.2 % — LOW (ref 39–50)
HGB BLD-MCNC: 8.1 G/DL — LOW (ref 13–17)
MAGNESIUM SERPL-MCNC: 2.5 MG/DL — SIGNIFICANT CHANGE UP (ref 1.6–2.6)
MCHC RBC-ENTMCNC: 28.9 PG — SIGNIFICANT CHANGE UP (ref 27–34)
MCHC RBC-ENTMCNC: 32.1 GM/DL — SIGNIFICANT CHANGE UP (ref 32–36)
MCV RBC AUTO: 90 FL — SIGNIFICANT CHANGE UP (ref 80–100)
NRBC # BLD: 0 /100 WBCS — SIGNIFICANT CHANGE UP (ref 0–0)
NRBC # FLD: 0 K/UL — SIGNIFICANT CHANGE UP (ref 0–0)
PHOSPHATE SERPL-MCNC: 3.7 MG/DL — SIGNIFICANT CHANGE UP (ref 2.5–4.5)
PLATELET # BLD AUTO: 388 K/UL — SIGNIFICANT CHANGE UP (ref 150–400)
POTASSIUM SERPL-MCNC: 4.1 MMOL/L — SIGNIFICANT CHANGE UP (ref 3.5–5.3)
POTASSIUM SERPL-SCNC: 4.1 MMOL/L — SIGNIFICANT CHANGE UP (ref 3.5–5.3)
PROT SERPL-MCNC: 7.2 G/DL — SIGNIFICANT CHANGE UP (ref 6–8.3)
RBC # BLD: 2.8 M/UL — LOW (ref 4.2–5.8)
RBC # FLD: 15.7 % — HIGH (ref 10.3–14.5)
SODIUM SERPL-SCNC: 143 MMOL/L — SIGNIFICANT CHANGE UP (ref 135–145)
WBC # BLD: 7.22 K/UL — SIGNIFICANT CHANGE UP (ref 3.8–10.5)
WBC # FLD AUTO: 7.22 K/UL — SIGNIFICANT CHANGE UP (ref 3.8–10.5)

## 2024-03-26 PROCEDURE — 93010 ELECTROCARDIOGRAM REPORT: CPT

## 2024-03-26 PROCEDURE — 99233 SBSQ HOSP IP/OBS HIGH 50: CPT | Mod: FS

## 2024-03-26 RX ORDER — LEVETIRACETAM 250 MG/1
125 TABLET, FILM COATED ORAL
Refills: 0 | Status: DISCONTINUED | OUTPATIENT
Start: 2024-03-26 | End: 2024-03-27

## 2024-03-26 RX ORDER — DARBEPOETIN ALFA IN POLYSORBAT 200MCG/0.4
60 PEN INJECTOR (ML) SUBCUTANEOUS ONCE
Refills: 0 | Status: DISCONTINUED | OUTPATIENT
Start: 2024-03-26 | End: 2024-03-27

## 2024-03-26 RX ADMIN — Medication 1 DROP(S): at 00:26

## 2024-03-26 RX ADMIN — PANTOPRAZOLE SODIUM 40 MILLIGRAM(S): 20 TABLET, DELAYED RELEASE ORAL at 05:11

## 2024-03-26 RX ADMIN — Medication 1 GRAM(S): at 17:40

## 2024-03-26 RX ADMIN — Medication 3 MILLILITER(S): at 08:16

## 2024-03-26 RX ADMIN — QUETIAPINE FUMARATE 50 MILLIGRAM(S): 200 TABLET, FILM COATED ORAL at 09:13

## 2024-03-26 RX ADMIN — Medication 125 MILLIGRAM(S): at 15:49

## 2024-03-26 RX ADMIN — Medication 125 MILLIGRAM(S): at 21:15

## 2024-03-26 RX ADMIN — Medication 1 DROP(S): at 11:33

## 2024-03-26 RX ADMIN — INSULIN GLARGINE 14 UNIT(S): 100 INJECTION, SOLUTION SUBCUTANEOUS at 11:33

## 2024-03-26 RX ADMIN — Medication 3 MILLILITER(S): at 18:32

## 2024-03-26 RX ADMIN — CHLORHEXIDINE GLUCONATE 15 MILLILITER(S): 213 SOLUTION TOPICAL at 05:13

## 2024-03-26 RX ADMIN — PANTOPRAZOLE SODIUM 40 MILLIGRAM(S): 20 TABLET, DELAYED RELEASE ORAL at 17:39

## 2024-03-26 RX ADMIN — Medication 20 MILLIGRAM(S): at 05:10

## 2024-03-26 RX ADMIN — CHLORHEXIDINE GLUCONATE 15 MILLILITER(S): 213 SOLUTION TOPICAL at 17:40

## 2024-03-26 RX ADMIN — TICAGRELOR 60 MILLIGRAM(S): 90 TABLET ORAL at 17:40

## 2024-03-26 RX ADMIN — HEPARIN SODIUM 5000 UNIT(S): 5000 INJECTION INTRAVENOUS; SUBCUTANEOUS at 14:53

## 2024-03-26 RX ADMIN — ESCITALOPRAM OXALATE 10 MILLIGRAM(S): 10 TABLET, FILM COATED ORAL at 11:33

## 2024-03-26 RX ADMIN — CARVEDILOL PHOSPHATE 6.25 MILLIGRAM(S): 80 CAPSULE, EXTENDED RELEASE ORAL at 05:10

## 2024-03-26 RX ADMIN — Medication 2 MILLIGRAM(S): at 21:15

## 2024-03-26 RX ADMIN — SENNA PLUS 10 MILLILITER(S): 8.6 TABLET ORAL at 21:15

## 2024-03-26 RX ADMIN — Medication 2: at 11:34

## 2024-03-26 RX ADMIN — Medication 125 MILLIGRAM(S): at 05:13

## 2024-03-26 RX ADMIN — QUETIAPINE FUMARATE 75 MILLIGRAM(S): 200 TABLET, FILM COATED ORAL at 21:16

## 2024-03-26 RX ADMIN — TICAGRELOR 60 MILLIGRAM(S): 90 TABLET ORAL at 05:10

## 2024-03-26 RX ADMIN — HEPARIN SODIUM 5000 UNIT(S): 5000 INJECTION INTRAVENOUS; SUBCUTANEOUS at 05:10

## 2024-03-26 RX ADMIN — LEVETIRACETAM 125 MILLIGRAM(S): 250 TABLET, FILM COATED ORAL at 17:40

## 2024-03-26 RX ADMIN — HEPARIN SODIUM 5000 UNIT(S): 5000 INJECTION INTRAVENOUS; SUBCUTANEOUS at 21:15

## 2024-03-26 RX ADMIN — Medication 0.5 MILLIGRAM(S): at 21:16

## 2024-03-26 RX ADMIN — Medication 81 MILLIGRAM(S): at 11:33

## 2024-03-26 RX ADMIN — CARVEDILOL PHOSPHATE 6.25 MILLIGRAM(S): 80 CAPSULE, EXTENDED RELEASE ORAL at 17:42

## 2024-03-26 RX ADMIN — Medication 1 DROP(S): at 05:13

## 2024-03-26 RX ADMIN — Medication 1 GRAM(S): at 05:11

## 2024-03-26 RX ADMIN — Medication 1 DROP(S): at 17:50

## 2024-03-26 RX ADMIN — LEVETIRACETAM 250 MILLIGRAM(S): 250 TABLET, FILM COATED ORAL at 05:11

## 2024-03-26 RX ADMIN — CHLORHEXIDINE GLUCONATE 1 APPLICATION(S): 213 SOLUTION TOPICAL at 11:35

## 2024-03-26 RX ADMIN — QUETIAPINE FUMARATE 12.5 MILLIGRAM(S): 200 TABLET, FILM COATED ORAL at 00:58

## 2024-03-26 RX ADMIN — Medication 6 MILLIGRAM(S): at 21:16

## 2024-03-26 RX ADMIN — Medication 1 APPLICATION(S): at 21:29

## 2024-03-26 RX ADMIN — POLYETHYLENE GLYCOL 3350 17 GRAM(S): 17 POWDER, FOR SOLUTION ORAL at 11:35

## 2024-03-26 NOTE — PROGRESS NOTE ADULT - SUBJECTIVE AND OBJECTIVE BOX
Aashish Boyer MD  Interventional Cardiology / Endovascular Specialist  [place] Office : [address] [place] [address] Tel:   [place] Office : [address] [facility] [address]  Tel: [phone]      Subjective/Overnight events: Patient lying in bed in RCU no acute distress      MEDICATIONS:  aspirin  chewable 81 milliGRAM(s) Enteral Tube daily  carvedilol 6.25 milliGRAM(s) Oral every 12 hours  doxazosin 2 milliGRAM(s) Oral at bedtime  furosemide    Tablet 20 milliGRAM(s) Oral daily  heparin   Injectable 5000 Unit(s) SubCutaneous every 8 hours  ticagrelor 60 milliGRAM(s) Oral every 12 hours      albuterol/ipratropium for Nebulization 3 milliLiter(s) Nebulizer every 12 hours    acetaminophen   Oral Liquid .. 650 milliGRAM(s) Oral every 6 hours PRN  escitalopram Solution 10 milliGRAM(s) Oral daily  levETIRAcetam  Solution 125 milliGRAM(s) Oral two times a day  LORazepam     Tablet 0.25 milliGRAM(s) Oral every 6 hours PRN  melatonin 6 milliGRAM(s) Oral at bedtime  QUEtiapine 75 milliGRAM(s) Oral at bedtime  QUEtiapine 12.5 milliGRAM(s) Oral every 6 hours PRN  QUEtiapine 50 milliGRAM(s) Oral <User Schedule>  valproic  acid Syrup 125 milliGRAM(s) Oral every 8 hours    pantoprazole   Suspension 40 milliGRAM(s) Oral every 12 hours  polyethylene glycol 3350 17 Gram(s) Oral daily  senna Syrup 10 milliLiter(s) Oral at bedtime  sucralfate suspension 1 Gram(s) Enteral Tube two times a day    dextrose 50% Injectable 25 Gram(s) IV Push once  dextrose 50% Injectable 12.5 Gram(s) IV Push once  dextrose 50% Injectable 25 Gram(s) IV Push once  dextrose Oral Gel 15 Gram(s) Oral once PRN  glucagon  Injectable 1 milliGRAM(s) IntraMuscular once  insulin glargine Injectable (LANTUS) 14 Unit(s) SubCutaneous <User Schedule>  insulin lispro (ADMELOG) corrective regimen sliding scale   SubCutaneous every 6 hours    artificial  tears Solution 1 Drop(s) Left EYE four times a day  chlorhexidine 0.12% Liquid 15 milliLiter(s) Oral Mucosa every 12 hours  chlorhexidine 2% Cloths 1 Application(s) Topical daily  dextrose 5%. 1000 milliLiter(s) IV Continuous <Continuous>  dextrose 5%. 1000 milliLiter(s) IV Continuous <Continuous>  petrolatum Ophthalmic Ointment 1 Application(s) Left EYE at bedtime      PAST MEDICAL/SURGICAL HISTORY  PAST MEDICAL & SURGICAL HISTORY:  Hyperlipemia      Hypertension      Coronary Artery Disease      Diabetes Mellitus Type II      Stented Coronary Artery  total 5 stents, last stent 5/2019      Neuropathy      Myocardial infarction      Stroke  mild left facial numbness   no other residuals verbalized      Myoclonic jerking      Stage 3 chronic kidney disease      History of Cataract Extraction      Hx of CABG      H/O coronary angiogram      S/P coronary artery stent placement  1/6/09      S/P placement of cardiac pacemaker          SOCIAL HISTORY: Substance Use (street drugs): ( x ) never used  (  ) other:    FAMILY HISTORY:  No pertinent family history in first degree relatives        PHYSICAL EXAM:  T(C): 36.4 (03-26-24 @ 12:00), Max: 36.6 (03-26-24 @ 04:56)  HR: 85 (03-26-24 @ 12:00) (81 - 91)  BP: 129/51 (03-26-24 @ 12:00) (113/43 - 135/56)  RR: 18 (03-26-24 @ 12:00) (16 - 24)  SpO2: 100% (03-26-24 @ 12:00) (98% - 100%)  Wt(kg): --  I&O's Summary    25 Mar 2024 07:01  -  26 Mar 2024 07:00  --------------------------------------------------------  IN: 1150 mL / OUT: 1112.5 mL / NET: 37.5 mL          GENERAL: s/p trach  EYES: conjunctiva and sclera clear  ENMT:   Moist mucous membranes, Good dentition, No lesions  Cardiovascular: Normal S1 S2, No JVD, No murmurs, No edema  Respiratory: Lungs clear to auscultation	  Gastrointestinal:  s/p PEG   Extremities: no edema                                  8.1    7.22  )-----------( 388      ( 26 Mar 2024 06:00 )             25.2     03-26    143  |  100  |  46<H>  ----------------------------<  119<H>  4.1   |  27  |  1.46<H>    Ca    8.9      26 Mar 2024 06:00  Phos  3.7     03-26  Mg     2.50     03-26    TPro  7.2  /  Alb  3.0<L>  /  TBili  0.2  /  DBili  <0.2  /  AST  19  /  ALT  12  /  AlkPhos  105  03-26    proBNP:   Lipid Profile:   HgA1c:   TSH:     Consultant(s) Notes Reviewed:  [x ] YES  [ ] NO    Care Discussed with Consultants/Other Providers [ x] YES  [ ] NO    Imaging Personally Reviewed independently:  [x] YES  [ ] NO    All labs, radiologic studies, vitals, orders and medications list reviewed. Patient is seen and examined at bedside. Case discussed with medical team.

## 2024-03-26 NOTE — PROGRESS NOTE ADULT - ASSESSMENT
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 Course ultimately complicated by progressive WOB and O2 demand and patient intubated and transferred to MICU (1/22). Course further complicated by acute cholecystitis and s/p Perc Lynsey with IR on (1/26), HFrEF 38, pulmonary edema, superimposed PNA, failed extubation and prolonged vent time and s/p trach (2/13). Patient transferred to RCU (2/16) and s/p PEG (2/20). Course complicated by volume overload and diuresis and GDMT continued and HFrEF 45 with improvement. Course further complicated by agitation from underlying vascular dementia/ hospital delirium.     NEUROLOGY   # Encephalopathy   - Hx of CVA with no residual deficits per family, however per family worsening confusion at home over the past 6 months (becoming disoriented to time) but prior to admission has been more confused, talking about seeing people that are not present.  - CTH (1/31) with no evidence of acute infarct  - Continue on ASA and Brilinta  - Holding Neurontin, Memantine and Oxycodone in setting of somnolence  - 3/17: obtunded overnight, CT Head: Mild chronic microvascular changes without evidence of an acute transcortical infarction or hemorrhage.  - VBG and Ammonia level grossly unremarkable    # ICA stenosis   - CTA NECK (1/31) with moderate to severe narrowing left internal carotid at the origin. Severe narrowing right internal carotid by a tandem lesion 1.5 cm above the bifurcation with a narrowed internal carotid beyond the lesion. Extensive calcified plaque both cavernous and supraclinoid carotid arteries with narrowing but no occlusion. Mild narrowing proximal right M1 segment. Moderate narrowing both vertebral V4 segments. No perfusion abnormality at the Tmax 6 second threshold, but moderate hypoperfusion in the right MCA territory at the 4 second threshold.   - Continue on ASA and Brilinta    # Myoclonic jerks   - Hx of myoclonic jerks post CVAs   - EEG (1/18-2/6) negative for seizures  - Continue on Keppra and per neuro/ psych wishes to wean, family in agreement   - Currently down titrating Keppra 500 mg BID, now Keppra 250 mg BID 3/13 - )    # Depression/ Insomnia  - Continue on Lexapro per home medication   - Course complicated by agitation and pulling on tubes   - Home Seroquel discontinued as patient now on Valproic acid syrup   - S/p Ativan 0.5mg PO Q6H PRN and Haldol 0.25 mg Q6H for severe agitation, then s/p IM Zypexa 1.25 Q8H PRN for agitation    - Supportive care     # Agitation in setting of delirium / underlying vascular dementia   - C/w  mg TID, BH following   - monitor platelets, LFTs while on Depakote   - Recently, Pt had responded much better to Ativan than Zyprexa for agitation, however became obtunded, so Ativan d/c Ativan 0.5mg Q6H PRN  - 3/18 Start Seroquel 25 mg in AM and Seroquel 50 mg QHS   - 3/20 restless /agitated overnight Ativan x 2 no effect - DW psych can do Seroquel 12.5 PRN/ increase night Seroquel to 75mg - continues on 1:1 restless with  family  - 3/21-3/22 Calm, remains delirious no recent episodes of agitation   - Became agitated with minimal response to Seroquel and required Ativan with improvement (overnight 3/22)  - 3/25 Increased AM Seroquel to 50 mg and c/w Seroquel 75 mg QHS     CARDIOLOGY   # Vasoplegic vs septic shock  # Hx of HTN   - Weaned off pressors in ICU and now with mild hypertension   - Continue on coreg and hydral as below   - Strict BP control given moderate AS  - Increased vascular congestion on CXR (3/9) so will give additional Lasix 20mg PO x1 (3/10)  - Diastolic bp low <50 hold Hydralazine and monitor bp - may need to decrease of dc   - Diastolic hypotension, Hydralazine discontinued (3/24)    # AFIB RVR   # Bifascicular Block with Medtronic PPM   - AFIB RVR episodes and PPM interrogated (2/20) by EP with similar episodes  - Previous admission in 6/2022 with cardiogenic syncope second to bifasicular block, however now with PPM and AV myron blockers ok.   - Continue on lopressor 12.5mg BID however replaced with coreg second to HFrEF   - AC discussed at length however with recent GIB will hold for now     # HFrEF 38 likely stress induced?   # Mild to moderate AS   - ECHO (12/2023) with EF 62 with normal LVSF and mild to moderate AS   - ECHO (2/2024) with EF 38, moderate LVSD with global LV hypokinesis, mildly reduced RVSF (TAPSE 1.3), mild to moderate MR, mild AR, and moderate AS.   - RPT ECHO (3/4) with EF 45 and mild to moderate LVSD   - Continue on Lasix 20 QD, coreg 6.25 and hydralazine 50 (increased 3/4)   - Hold isordil and up titrate above medications first   - Hydralazine discontinued given diastolic hypotension (3/24)    # CAD with CABG   - CATH (5/2022) with dLAD 70, SVG graft to OM1 with 90 in stent stenosis s/p PCI and GEMMA placement, and LIMA graft to mLAD with no disease.   - Continue on ASA and Brilinta    # Prolonged QTC   - ECG (3/2) with QTC 616ms (corrected using bazzet 490ms)   - ECG (3/3) with QTC 634ms (corrected using bazzet 490ms)   - ECG (3/11) with QTC 490ms, EKG (3/15) QTc 498ms, EKG (3/21) 486ms, EKG (3/22) 521  - Monitor QTc/ daily EKGs       RESPIRATORY   # Acute respiratory failure second to SARS-COV-2 vs pulm edema vs PNA  - Presented with COVID complicated by sepsis second to acute lynsey and worsening respiratory failure second to pulmonary edema requiring intubation.   - Prolonged intubation and s/p tracheostomy (2/13)   - CT CHEST 1/16 with new small bilateral pleural effusions/ atelectasis/ patchy bilateral ground-glass opacities are consistent with pulmonary edema.  - Completed ABX and COVID regimen as below   - Continue on vent and allow SBT as tolerated   - Continue on nebs and hold further HTS given intermittent hemoptysis  - Continue on diuresis as above    # Mild hemoptysis likely from suction trauma   - s/p bronch (2/18) with no active bleed  - Hemoptysis improved with TXA and holding further HTS as above   - Tiny hemoptysis second to suction trauma imporving  - Careful with suctioning   - Recurrent hemoptysis (3/9) so ordered for TXA nebs again however hemoptysis appeared self limited and stopped before nebs even given    HEENT   # L eye subconjunctival hemorrhage   - Continue on artifical tears and Lacrilube   - Optho eval appreciated     GI  # Nutrition/ Dysphagia   - PEG placed by GI on 2/20 and tube feeds continued.   - Loose stools and Banatrol continued     # UGIB   - EGD (1/2) with esophagitis and bleeding Dieulafoy's lesion s/p clips.   - Continue on PPI and carafate     # SMA calcification   - CTA demonstrating mesenteric fat stranding associated with ascending/transverse colon  - Diagnostic laparoscopy (12/24) with small bowel and visible colon viable, some inflammation of omentum in RUQ  - SMA Angiogram and stent placed (12/25).   - Continue on ASA and Brilinta     # Acute Cholecystitis   - CT (12/26) with distended GB with cholelithiasis and sludge   - HIDA (12/29) POSITIVE for acute cholecystitis   - Case discussed with IR at length and s/p PERC LYNSEY (1/26)   - Completed zosyn course (12/24-12/31)   - PERC LYNSEY tube to stay in until surgical candidate for cholecystectomy to prevent recurrence   - Noted with some bloody perc lynsey output so IR consulted, recommending CT A/P to assess if still in proper position  - 3/24 CT A/P "gallblader decompressed with per lynsey tube, mod b/l pleural effusions"    / RENAL   # CKD   - CRE at baseline   - Monitor renal function and UOP   - Aranesp SQ x1 (3/8)    # Hypernatremia (resolved)   - s/p  Q4H  - Monitor closely with diuresis as above   - Stop FWF (3/15) given c/f worsening vol overload     INFECTIOUS DISEASE   # COVID with superimposed PNA   # ESBL Kleb PNA   - COVID POSITIVE (12/23) and completed remdesivir x 1 dose and paxlovid course.   - WOB worsened as above requiring intubation and cultures negative. Zosyn completed empirically however hypothermic (2/11) and BCx, UA, RVP and BAL negative.   - Completed additional zosyn (2/12-2/19) empirically   - Fever spike and thick secretions and SCX (2/26) with ESBL klebs.   - s/p Zosyn (2/27 - 2/29) but resistant and completed Erta (2/29 - 3/6)     HEME  # AOCD   - Monitor HH   - DVT PPX with HSQ     ENDOCRINE   # DM2 A1C 9.3   - Continue on Lantus 14 and ISS     SKIN/ TUBES   - Trach (2/13)   - PEG (2/20)   - PERC LYNSEY (1/26 - )     ETHICS   - FULL CODE     DISPO - vent facility and family aware. SW with accepting facility however pending available bed.  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 Course ultimately complicated by progressive WOB and O2 demand and patient intubated and transferred to MICU (1/22). Course further complicated by acute cholecystitis and s/p Perc Lynsey with IR on (1/26), HFrEF 38, pulmonary edema, superimposed PNA, failed extubation and prolonged vent time and s/p trach (2/13). Patient transferred to RCU (2/16) and s/p PEG (2/20). Course complicated by volume overload and diuresis and GDMT continued and HFrEF 45 with improvement. Course further complicated by agitation from underlying vascular dementia/ hospital delirium.     NEUROLOGY   # Encephalopathy   - Hx of CVA with no residual deficits per family, however per family worsening confusion at home over the past 6 months (becoming disoriented to time) but prior to admission has been more confused, talking about seeing people that are not present.  - CTH (1/31) with no evidence of acute infarct  - Continue on ASA and Brilinta  - Holding Neurontin, Memantine and Oxycodone in setting of somnolence  - 3/17: obtunded overnight, CT Head: Mild chronic microvascular changes without evidence of an acute transcortical infarction or hemorrhage.  - VBG and Ammonia level grossly unremarkable    # ICA stenosis   - CTA NECK (1/31) with moderate to severe narrowing left internal carotid at the origin. Severe narrowing right internal carotid by a tandem lesion 1.5 cm above the bifurcation with a narrowed internal carotid beyond the lesion. Extensive calcified plaque both cavernous and supraclinoid carotid arteries with narrowing but no occlusion. Mild narrowing proximal right M1 segment. Moderate narrowing both vertebral V4 segments. No perfusion abnormality at the Tmax 6 second threshold, but moderate hypoperfusion in the right MCA territory at the 4 second threshold.   - Continue on ASA and Brilinta    # Myoclonic jerks   - Hx of myoclonic jerks post CVAs   - EEG (1/18-2/6) negative for seizures  - Continue on Keppra and per neuro/ psych wishes to wean, family in agreement   - Currently down titrating Keppra 500 mg BID, now Keppra 250 mg BID 3/13 - )    # Depression/ Insomnia  - Continue on Lexapro per home medication   - Course complicated by agitation and pulling on tubes   - Home Seroquel discontinued as patient now on Valproic acid syrup   - S/p Ativan 0.5mg PO Q6H PRN and Haldol 0.25 mg Q6H for severe agitation, then s/p IM Zypexa 1.25 Q8H PRN for agitation    - Supportive care     # Agitation in setting of delirium / underlying vascular dementia   - C/w  mg TID, BH following   - monitor platelets, LFTs while on Depakote   - Recently, Pt had responded much better to Ativan than Zyprexa for agitation, however became obtunded, so Ativan d/c Ativan 0.5mg Q6H PRN  - 3/18 Start Seroquel 25 mg in AM and Seroquel 50 mg QHS   - 3/20 restless /agitated overnight Ativan x 2 no effect - DW psych can do Seroquel 12.5 PRN/ increase night Seroquel to 75mg - continues on 1:1 restless with  family  - 3/21-3/22 Calm, remains delirious no recent episodes of agitation   - Became agitated with minimal response to Seroquel and required Ativan with improvement (overnight 3/22)  - 3/25 Increased AM Seroquel to 50 mg and c/w Seroquel 75 mg QHS     CARDIOLOGY   # Vasoplegic vs septic shock  # Hx of HTN   - Weaned off pressors in ICU and now with mild hypertension   - Continue on coreg and hydral as below   - Strict BP control given moderate AS  - Increased vascular congestion on CXR (3/9) so will give additional Lasix 20mg PO x1 (3/10)  - Diastolic bp low <50 hold Hydralazine and monitor bp - may need to decrease of dc   - Diastolic hypotension, Hydralazine discontinued (3/24)    # AFIB RVR   # Bifascicular Block with Medtronic PPM   - AFIB RVR episodes and PPM interrogated (2/20) by EP with similar episodes  - Previous admission in 6/2022 with cardiogenic syncope second to bifasicular block, however now with PPM and AV myron blockers ok.   - Continue on lopressor 12.5mg BID however replaced with coreg second to HFrEF   - AC discussed at length however with recent GIB will hold for now     # HFrEF 38 likely stress induced?   # Mild to moderate AS   - ECHO (12/2023) with EF 62 with normal LVSF and mild to moderate AS   - ECHO (2/2024) with EF 38, moderate LVSD with global LV hypokinesis, mildly reduced RVSF (TAPSE 1.3), mild to moderate MR, mild AR, and moderate AS.   - RPT ECHO (3/4) with EF 45 and mild to moderate LVSD   - Continue on Lasix 20 QD, coreg 6.25 and hydralazine 50 (increased 3/4)   - Hold isordil and up titrate above medications first   - Hydralazine discontinued given diastolic hypotension (3/24)    # CAD with CABG   - CATH (5/2022) with dLAD 70, SVG graft to OM1 with 90 in stent stenosis s/p PCI and GEMMA placement, and LIMA graft to mLAD with no disease.   - Continue on ASA and Brilinta    # Prolonged QTC   - ECG (3/2) with QTC 616ms (corrected using bazzet 490ms)   - ECG (3/3) with QTC 634ms (corrected using bazzet 490ms)   - ECG (3/11) with QTC 490ms, EKG (3/15) QTc 498ms, EKG (3/21) 486ms, EKG (3/23) 478, EKG (3/25) 508  - Monitor QTc/ daily EKGs       RESPIRATORY   # Acute respiratory failure second to SARS-COV-2 vs pulm edema vs PNA  - Presented with COVID complicated by sepsis second to acute lynsey and worsening respiratory failure second to pulmonary edema requiring intubation.   - Prolonged intubation and s/p tracheostomy (2/13)   - CT CHEST 1/16 with new small bilateral pleural effusions/ atelectasis/ patchy bilateral ground-glass opacities are consistent with pulmonary edema.  - Completed ABX and COVID regimen as below   - Continue on vent and allow SBT as tolerated   - Continue on nebs and hold further HTS given intermittent hemoptysis  - Continue on diuresis as above    # Mild hemoptysis likely from suction trauma   - s/p bronch (2/18) with no active bleed  - Hemoptysis improved with TXA and holding further HTS as above   - Tiny hemoptysis second to suction trauma imporving  - Careful with suctioning   - Recurrent hemoptysis (3/9) so ordered for TXA nebs again however hemoptysis appeared self limited and stopped before nebs even given    HEENT   # L eye subconjunctival hemorrhage   - Continue on artifical tears and Lacrilube   - Optho eval appreciated     GI  # Nutrition/ Dysphagia   - PEG placed by GI on 2/20 and tube feeds continued.   - Loose stools and Banatrol continued     # UGIB   - EGD (1/2) with esophagitis and bleeding Dieulafoy's lesion s/p clips.   - Continue on PPI and carafate     # SMA calcification   - CTA demonstrating mesenteric fat stranding associated with ascending/transverse colon  - Diagnostic laparoscopy (12/24) with small bowel and visible colon viable, some inflammation of omentum in RUQ  - SMA Angiogram and stent placed (12/25).   - Continue on ASA and Brilinta     # Acute Cholecystitis   - CT (12/26) with distended GB with cholelithiasis and sludge   - HIDA (12/29) POSITIVE for acute cholecystitis   - Case discussed with IR at length and s/p PERC LYNSEY (1/26)   - Completed zosyn course (12/24-12/31)   - PERC LYNSEY tube to stay in until surgical candidate for cholecystectomy to prevent recurrence   - Noted with some bloody perc lynsey output so IR consulted, recommending CT A/P to assess if still in proper position  - 3/24 CT A/P "gallblader decompressed with per lynsey tube, mod b/l pleural effusions"    / RENAL   # CKD   - CRE at baseline   - Monitor renal function and UOP   - Aranesp SQ x1 (3/8)    # Hypernatremia (resolved)   - s/p  Q4H  - Monitor closely with diuresis as above   - Stop FWF (3/15) given c/f worsening vol overload     INFECTIOUS DISEASE   # COVID with superimposed PNA   # ESBL Kleb PNA   - COVID POSITIVE (12/23) and completed remdesivir x 1 dose and paxlovid course.   - WOB worsened as above requiring intubation and cultures negative. Zosyn completed empirically however hypothermic (2/11) and BCx, UA, RVP and BAL negative.   - Completed additional zosyn (2/12-2/19) empirically   - Fever spike and thick secretions and SCX (2/26) with ESBL klebs.   - s/p Zosyn (2/27 - 2/29) but resistant and completed Erta (2/29 - 3/6)     HEME  # AOCD   - Monitor HH   - DVT PPX with HSQ     ENDOCRINE   # DM2 A1C 9.3   - Continue on Lantus 14 and ISS     SKIN/ TUBES   - Trach (2/13)   - PEG (2/20)   - PERC LYNSEY (1/26 - )     ETHICS   - FULL CODE     DISPO - vent facility and family aware. SW with accepting facility however pending available bed.  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 Course ultimately complicated by progressive WOB and O2 demand and patient intubated and transferred to MICU (1/22). Course further complicated by acute cholecystitis and s/p Perc Lynsey with IR on (1/26), HFrEF 38, pulmonary edema, superimposed PNA, failed extubation and prolonged vent time and s/p trach (2/13). Patient transferred to RCU (2/16) and s/p PEG (2/20). Course complicated by volume overload and diuresis and GDMT continued and HFrEF 45 with improvement. Course further complicated by agitation from underlying vascular dementia/ hospital delirium.     NEUROLOGY   # Encephalopathy   - Hx of CVA with no residual deficits per family, however per family worsening confusion at home over the past 6 months (becoming disoriented to time) but prior to admission has been more confused, talking about seeing people that are not present.  - CTH (1/31) with no evidence of acute infarct  - Continue on ASA and Brilinta  - Holding Neurontin, Memantine and Oxycodone in setting of somnolence  - 3/17: obtunded overnight, CT Head: Mild chronic microvascular changes without evidence of an acute transcortical infarction or hemorrhage.  - VBG and Ammonia level grossly unremarkable    # ICA stenosis   - CTA NECK (1/31) with moderate to severe narrowing left internal carotid at the origin. Severe narrowing right internal carotid by a tandem lesion 1.5 cm above the bifurcation with a narrowed internal carotid beyond the lesion. Extensive calcified plaque both cavernous and supraclinoid carotid arteries with narrowing but no occlusion. Mild narrowing proximal right M1 segment. Moderate narrowing both vertebral V4 segments. No perfusion abnormality at the Tmax 6 second threshold, but moderate hypoperfusion in the right MCA territory at the 4 second threshold.   - Continue on ASA and Brilinta    # Myoclonic jerks   - Hx of myoclonic jerks post CVAs   - EEG (1/18-2/6) negative for seizures  - Continue on Keppra and per neuro/ psych wishes to wean, family in agreement   - Currently down titrating home Keppra 500 BID, 250 mg BID (3/13 - 3/26), 125 BID (3/26 - )    # Depression/ Insomnia  - Continue on Lexapro per home medication   - Course complicated by agitation and pulling on tubes   - Home Seroquel discontinued as patient now on Valproic acid syrup   - S/p Ativan 0.5mg PO Q6H PRN and Haldol 0.25 mg Q6H for severe agitation, then s/p IM Zypexa 1.25 Q8H PRN for agitation    - Supportive care     # Agitation in setting of delirium / underlying vascular dementia   - C/w  mg TID,  following   - monitor platelets, LFTs while on Depakote   - Recently, Pt had responded much better to Ativan than Zyprexa for agitation, however became obtunded, so Ativan d/c Ativan 0.5mg Q6H PRN  - 3/18 Start Seroquel 25 mg in AM and Seroquel 50 mg QHS   - 3/20 restless /agitated overnight Ativan x 2 no effect - DW psych can do Seroquel 12.5 PRN/ increase night Seroquel to 75mg - continues on 1:1 restless with  family  - 3/21-3/22 Calm, remains delirious no recent episodes of agitation   - Became agitated with minimal response to Seroquel and required Ativan with improvement (overnight 3/22)  - 3/25 Increased AM Seroquel to 50 mg and c/w Seroquel 75 mg QHS     CARDIOLOGY   # Vasoplegic vs septic shock  # Hx of HTN   - Weaned off pressors in ICU and now with mild hypertension   - Continue on coreg and hydral as below   - Strict BP control given moderate AS  - Increased vascular congestion on CXR (3/9) so will give additional Lasix 20mg PO x1 (3/10)  - Diastolic bp low <50 hold Hydralazine and monitor bp - may need to decrease of dc   - Diastolic hypotension, Hydralazine discontinued (3/24)    # AFIB RVR   # Bifascicular Block with Medtronic PPM   - AFIB RVR episodes and PPM interrogated (2/20) by EP with similar episodes  - Previous admission in 6/2022 with cardiogenic syncope second to bifasicular block, however now with PPM and AV myron blockers ok.   - Continue on lopressor 12.5mg BID however replaced with coreg second to HFrEF   - AC discussed at length however with recent GIB will hold for now     # HFrEF 38 likely stress induced?   # Mild to moderate AS   - ECHO (12/2023) with EF 62 with normal LVSF and mild to moderate AS   - ECHO (2/2024) with EF 38, moderate LVSD with global LV hypokinesis, mildly reduced RVSF (TAPSE 1.3), mild to moderate MR, mild AR, and moderate AS.   - RPT ECHO (3/4) with EF 45 and mild to moderate LVSD   - Continue on Lasix 20 QD, coreg 6.25 and hydralazine 50 (increased 3/4)   - Hold isordil and up titrate above medications first   - Hydralazine discontinued given diastolic hypotension (3/24)    # CAD with CABG   - CATH (5/2022) with dLAD 70, SVG graft to OM1 with 90 in stent stenosis s/p PCI and GEMMA placement, and LIMA graft to mLAD with no disease.   - Continue on ASA and Brilinta    # Prolonged QTC   - ECG (3/2) with QTC 616ms (corrected using bazzet 490ms)   - ECG (3/3) with QTC 634ms (corrected using bazzet 490ms)   - ECG (3/11) with QTC 490ms, EKG (3/15) QTc 498ms, EKG (3/21) 486ms, EKG (3/23) 478, EKG (3/25) 508  - Monitor QTc/ daily EKGs       RESPIRATORY   # Acute respiratory failure second to SARS-COV-2 vs pulm edema vs PNA  - Presented with COVID complicated by sepsis second to acute lynsey and worsening respiratory failure second to pulmonary edema requiring intubation.   - Prolonged intubation and s/p tracheostomy (2/13)   - CT CHEST 1/16 with new small bilateral pleural effusions/ atelectasis/ patchy bilateral ground-glass opacities are consistent with pulmonary edema.  - Completed ABX and COVID regimen as below   - Continue on vent and allow SBT as tolerated   - Continue on nebs and hold further HTS given intermittent hemoptysis  - Continue on diuresis as above    # Mild hemoptysis likely from suction trauma   - s/p bronch (2/18) with no active bleed  - Hemoptysis improved with TXA and holding further HTS as above   - Tiny hemoptysis second to suction trauma imporving  - Careful with suctioning   - Recurrent hemoptysis (3/9) so ordered for TXA nebs again however hemoptysis appeared self limited and stopped before nebs even given    HEENT   # L eye subconjunctival hemorrhage   - Continue on artifical tears and Lacrilube   - Optho eval appreciated     GI  # Nutrition/ Dysphagia   - PEG placed by GI on 2/20 and tube feeds continued.   - Loose stools and Banatrol continued     # UGIB   - EGD (1/2) with esophagitis and bleeding Dieulafoy's lesion s/p clips.   - Continue on PPI and carafate     # SMA calcification   - CTA demonstrating mesenteric fat stranding associated with ascending/transverse colon  - Diagnostic laparoscopy (12/24) with small bowel and visible colon viable, some inflammation of omentum in RUQ  - SMA Angiogram and stent placed (12/25).   - Continue on ASA and Brilinta     # Acute Cholecystitis   - CT (12/26) with distended GB with cholelithiasis and sludge   - HIDA (12/29) POSITIVE for acute cholecystitis   - Case discussed with IR at length and s/p PERC LYNSEY (1/26)   - Completed zosyn course (12/24-12/31)   - PERC LYNSEY tube to stay in until surgical candidate for cholecystectomy to prevent recurrence   - Noted with some bloody perc lynsey output so IR consulted, recommending CT A/P to assess if still in proper position  - 3/24 CT A/P "gallblader decompressed with per lynsey tube, mod b/l pleural effusions"    / RENAL   # CKD   - CRE at baseline   - Monitor renal function and UOP   - Aranesp SQ x1 (3/8)    # Hypernatremia (resolved)   - s/p  Q4H  - Monitor closely with diuresis as above   - Stop FWF (3/15) given c/f worsening vol overload     INFECTIOUS DISEASE   # COVID with superimposed PNA   # ESBL Kleb PNA   - COVID POSITIVE (12/23) and completed remdesivir x 1 dose and paxlovid course.   - WOB worsened as above requiring intubation and cultures negative. Zosyn completed empirically however hypothermic (2/11) and BCx, UA, RVP and BAL negative.   - Completed additional zosyn (2/12-2/19) empirically   - Fever spike and thick secretions and SCX (2/26) with ESBL klebs.   - s/p Zosyn (2/27 - 2/29) but resistant and completed Erta (2/29 - 3/6)     HEME  # AOCD   - Monitor HH   - DVT PPX with HSQ     ENDOCRINE   # DM2 A1C 9.3   - Continue on Lantus 14 and ISS     SKIN/ TUBES   - Trach (2/13)   - PEG (2/20)   - PERC LYNSEY (1/26 - )     ETHICS   - FULL CODE     DISPO - vent facility and family aware. SW with accepting facility however pending available bed.  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 Course ultimately complicated by progressive WOB and O2 demand and patient intubated and transferred to MICU (1/22). Course further complicated by acute cholecystitis and s/p Perc Lynsey with IR on (1/26), HFrEF 38, pulmonary edema, superimposed PNA, failed extubation and prolonged vent time and s/p trach (2/13). Patient transferred to RCU (2/16) and s/p PEG (2/20). Course complicated by volume overload and diuresis and GDMT continued and HFrEF 45 with improvement. Course further complicated by agitation from underlying vascular dementia/ hospital delirium.     NEUROLOGY   # Encephalopathy   - Hx of CVA with no residual deficits per family, however per family worsening confusion at home over the past 6 months (becoming disoriented to time) but prior to admission has been more confused, talking about seeing people that are not present.  - CTH (1/31) with no evidence of acute infarct  - Continue on ASA and Brilinta  - Holding Neurontin, Memantine and Oxycodone in setting of somnolence  - 3/17: obtunded overnight, CT Head: Mild chronic microvascular changes without evidence of an acute transcortical infarction or hemorrhage.  - VBG and Ammonia level grossly unremarkable    # ICA stenosis   - CTA NECK (1/31) with moderate to severe narrowing left internal carotid at the origin. Severe narrowing right internal carotid by a tandem lesion 1.5 cm above the bifurcation with a narrowed internal carotid beyond the lesion. Extensive calcified plaque both cavernous and supraclinoid carotid arteries with narrowing but no occlusion. Mild narrowing proximal right M1 segment. Moderate narrowing both vertebral V4 segments. No perfusion abnormality at the Tmax 6 second threshold, but moderate hypoperfusion in the right MCA territory at the 4 second threshold.   - Continue on ASA and Brilinta    # Myoclonic jerks   - Hx of myoclonic jerks post CVAs   - EEG (1/18-2/6) negative for seizures  - Continue on Keppra and per neuro/ psych wishes to wean, family in agreement   - Currently down titrating home Keppra 500 BID, 250 mg BID (3/13 - 3/26), 125 BID (3/26 - )    # Depression/ Insomnia  - Continue on Lexapro per home medication   - Course complicated by agitation and pulling on tubes   - Home Seroquel discontinued as patient now on Valproic acid syrup   - S/p Ativan 0.5mg PO Q6H PRN and Haldol 0.25 mg Q6H for severe agitation, then s/p IM Zypexa 1.25 Q8H PRN for agitation    - Supportive care     # Agitation in setting of delirium / underlying vascular dementia   - C/w  mg TID,  following   - monitor platelets, LFTs while on Depakote   - Recently, Pt had responded much better to Ativan than Zyprexa for agitation, however became obtunded, so Ativan d/c Ativan 0.5mg Q6H PRN  - 3/18 Start Seroquel 25 mg in AM and Seroquel 50 mg QHS   - 3/20 restless /agitated overnight Ativan x 2 no effect - DW psych can do Seroquel 12.5 PRN/ increase night Seroquel to 75mg - continues on 1:1 restless with  family  - 3/21-3/22 Calm, remains delirious no recent episodes of agitation   - Became agitated with minimal response to Seroquel and required Ativan with improvement (overnight 3/22)  - 3/25 Increased AM Seroquel to 50 mg and c/w Seroquel 75 mg QHS     CARDIOLOGY   # Vasoplegic vs septic shock  # Hx of HTN   - Weaned off pressors in ICU and now with mild hypertension   - Continue on coreg and hydral as below   - Strict BP control given moderate AS  - Increased vascular congestion on CXR (3/9) so will give additional Lasix 20mg PO x1 (3/10)  - Diastolic bp low <50 hold Hydralazine and monitor bp - may need to decrease of dc   - Diastolic hypotension, Hydralazine discontinued (3/24)    # AFIB RVR   # Bifascicular Block with Medtronic PPM   - AFIB RVR episodes and PPM interrogated (2/20) by EP with similar episodes  - Previous admission in 6/2022 with cardiogenic syncope second to bifasicular block, however now with PPM and AV myron blockers ok.   - Continue on lopressor 12.5mg BID however replaced with coreg second to HFrEF   - AC discussed at length however with recent GIB will hold for now     # HFrEF 38 likely stress induced?   # Mild to moderate AS   - ECHO (12/2023) with EF 62 with normal LVSF and mild to moderate AS   - ECHO (2/2024) with EF 38, moderate LVSD with global LV hypokinesis, mildly reduced RVSF (TAPSE 1.3), mild to moderate MR, mild AR, and moderate AS.   - RPT ECHO (3/4) with EF 45 and mild to moderate LVSD   - Continue on Lasix 20 QD, coreg 6.25 and hydralazine 50 (increased 3/4)   - Hold isordil and up titrate above medications first   - Hydralazine discontinued given diastolic hypotension (3/24)    # CAD with CABG   - CATH (5/2022) with dLAD 70, SVG graft to OM1 with 90 in stent stenosis s/p PCI and GEMMA placement, and LIMA graft to mLAD with no disease.   - Continue on ASA and Brilinta    # Prolonged QTC   - ECG (3/2) with QTC 616ms (corrected using bazzet 490ms)   - ECG (3/3) with QTC 634ms (corrected using bazzet 490ms)   - ECG (3/11) with QTC 490ms, EKG (3/15) QTc 498ms, EKG (3/21) 486ms, EKG (3/23) 478, EKG (3/25) 508, EKG (3/26) 518 (corrected using bazzet 476ms)   - Monitor QTc/ daily EKGs       RESPIRATORY   # Acute respiratory failure second to SARS-COV-2 vs pulm edema vs PNA  - Presented with COVID complicated by sepsis second to acute lynsey and worsening respiratory failure second to pulmonary edema requiring intubation.   - Prolonged intubation and s/p tracheostomy (2/13)   - CT CHEST 1/16 with new small bilateral pleural effusions/ atelectasis/ patchy bilateral ground-glass opacities are consistent with pulmonary edema.  - Completed ABX and COVID regimen as below   - Continue on vent and allow SBT as tolerated   - Continue on nebs and hold further HTS given intermittent hemoptysis  - Continue on diuresis as above    # Mild hemoptysis likely from suction trauma   - s/p bronch (2/18) with no active bleed  - Hemoptysis improved with TXA and holding further HTS as above   - Tiny hemoptysis second to suction trauma imporving  - Careful with suctioning   - Recurrent hemoptysis (3/9) so ordered for TXA nebs again however hemoptysis appeared self limited and stopped before nebs even given    HEENT   # L eye subconjunctival hemorrhage   - Continue on artifical tears and Lacrilube   - Optho eval appreciated     GI  # Nutrition/ Dysphagia   - PEG placed by GI on 2/20 and tube feeds continued.   - Loose stools and Banatrol continued     # UGIB   - EGD (1/2) with esophagitis and bleeding Dieulafoy's lesion s/p clips.   - Continue on PPI and carafate     # SMA calcification   - CTA demonstrating mesenteric fat stranding associated with ascending/transverse colon  - Diagnostic laparoscopy (12/24) with small bowel and visible colon viable, some inflammation of omentum in RUQ  - SMA Angiogram and stent placed (12/25).   - Continue on ASA and Brilinta     # Acute Cholecystitis   - CT (12/26) with distended GB with cholelithiasis and sludge   - HIDA (12/29) POSITIVE for acute cholecystitis   - Case discussed with IR at length and s/p PERC LYNSEY (1/26)   - Completed zosyn course (12/24-12/31)   - PERC LYNSEY tube to stay in until surgical candidate for cholecystectomy to prevent recurrence   - Noted with some bloody perc lynsey output so IR consulted, recommending CT A/P to assess if still in proper position  - 3/24 CT A/P "gallblader decompressed with per lynsey tube, mod b/l pleural effusions"    / RENAL   # CKD   - CRE at baseline   - Monitor renal function and UOP   - Aranesp SQ x1 (3/8)    # Hypernatremia (resolved)   - s/p  Q4H  - Monitor closely with diuresis as above   - Stop FWF (3/15) given c/f worsening vol overload     INFECTIOUS DISEASE   # COVID with superimposed PNA   # ESBL Kleb PNA   - COVID POSITIVE (12/23) and completed remdesivir x 1 dose and paxlovid course.   - WOB worsened as above requiring intubation and cultures negative. Zosyn completed empirically however hypothermic (2/11) and BCx, UA, RVP and BAL negative.   - Completed additional zosyn (2/12-2/19) empirically   - Fever spike and thick secretions and SCX (2/26) with ESBL klebs.   - s/p Zosyn (2/27 - 2/29) but resistant and completed Erta (2/29 - 3/6)     HEME  # AOCD   - Monitor HH   - DVT PPX with HSQ     ENDOCRINE   # DM2 A1C 9.3   - Continue on Lantus 14 and ISS     SKIN/ TUBES   - Trach (2/13)   - PEG (2/20)   - PERC LYNSEY (1/26 - )     ETHICS   - FULL CODE     DISPO - vent facility and family aware. SW with accepting facility however pending available bed.

## 2024-03-26 NOTE — PROGRESS NOTE ADULT - ASSESSMENT
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     EKG SR RBBB (old per family)     1) Hypoxia   - s/p bronch   - Rt pleural effusion   - held lasix given Hypernatremia and worsening creat , CXR with cephalization, given a does of IV lasix monitor u/o renal function    - f/u neuro recs MRI brain shows no acute disease  - s/p trach and PEG  -corrected QTc on    - on lasix 20mg PO daily        2) CAD s/p CABG  - p/w chest pain. EKG non ischemic , trop -sera   - chest pains  Trop mildly elevated and trending down likely sec to kidney disease,   - holding brilinta, was on it for SMA stent placed in 12/2023, held 2/2 anticipation for PEG placement, restart when no further invasive procedures planned  -TTE 2/12 with  mod decrease LV systolic function, new.  will medically manage likely stress induced.  repeat TTE with mild-mod LV dysfunction c/w PO lasix euvolemic on exam     3) Covid +  - s/p remdesivir   - c/w supportive management   - t/t per primary team   -resolved    4) Abd distension   -CTA AP obtained which showed  partially occlusive calcified and non-calcified plaque just distal to take-off of the SMA, with estimated at least 75% luminal narrowing. Limited evaluation of its distal branches. 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   -on Protonix q 12      6) Afib  -hx of PAF  -no AC 2/2 GIB  -on coreg 6.25mg BID

## 2024-03-26 NOTE — PROGRESS NOTE ADULT - SUBJECTIVE AND OBJECTIVE BOX
CHIEF COMPLAINT: Patient is a 89y old  Male who presents with a chief complaint of COVID19, Chest pain (25 Mar 2024 14:07)      INTERVAL EVENTS:   - PRN Seroquel given overnight.     ROS: Seen by bedside during AM rounds     OBJECTIVE:  ICU Vital Signs Last 24 Hrs  T(C): 36.6 (26 Mar 2024 04:56), Max: 36.6 (26 Mar 2024 04:56)  T(F): 97.9 (26 Mar 2024 04:56), Max: 97.9 (26 Mar 2024 04:56)  HR: 86 (26 Mar 2024 04:56) (78 - 91)  BP: 115/61 (26 Mar 2024 04:56) (113/43 - 135/56)  BP(mean): --  ABP: --  ABP(mean): --  RR: 16 (26 Mar 2024 04:56) (16 - 24)  SpO2: 98% (26 Mar 2024 04:56) (98% - 100%)    O2 Parameters below as of 26 Mar 2024 04:56  Patient On (Oxygen Delivery Method): ventilator          Mode: AC/ CMV (Assist Control/ Continuous Mandatory Ventilation), RR (machine): 16, TV (machine): 400, FiO2: 30, PEEP: 5, MAP: 13, PIP: 28    03-25 @ 07:01  -  03-26 @ 07:00  --------------------------------------------------------  IN: 1150 mL / OUT: 1112.5 mL / NET: 37.5 mL      CAPILLARY BLOOD GLUCOSE      POCT Blood Glucose.: 131 mg/dL (26 Mar 2024 04:31)      PHYSICAL EXAM:  General:   HEENT:   Lymph Nodes:  Neck:   Respiratory:   Cardiovascular:   Abdomen:   Extremities:   Skin:   Neurological:  Psychiatry:    Mode: AC/ CMV (Assist Control/ Continuous Mandatory Ventilation)  RR (machine): 16  TV (machine): 400  FiO2: 30  PEEP: 5  MAP: 13  PIP: 28      HOSPITAL MEDICATIONS:  MEDICATIONS  (STANDING):  albuterol/ipratropium for Nebulization 3 milliLiter(s) Nebulizer every 12 hours  artificial  tears Solution 1 Drop(s) Left EYE four times a day  aspirin  chewable 81 milliGRAM(s) Enteral Tube daily  carvedilol 6.25 milliGRAM(s) Oral every 12 hours  chlorhexidine 0.12% Liquid 15 milliLiter(s) Oral Mucosa every 12 hours  chlorhexidine 2% Cloths 1 Application(s) Topical daily  dextrose 5%. 1000 milliLiter(s) (100 mL/Hr) IV Continuous <Continuous>  dextrose 5%. 1000 milliLiter(s) (50 mL/Hr) IV Continuous <Continuous>  dextrose 50% Injectable 25 Gram(s) IV Push once  dextrose 50% Injectable 12.5 Gram(s) IV Push once  dextrose 50% Injectable 25 Gram(s) IV Push once  doxazosin 2 milliGRAM(s) Oral at bedtime  escitalopram Solution 10 milliGRAM(s) Oral daily  furosemide    Tablet 20 milliGRAM(s) Oral daily  glucagon  Injectable 1 milliGRAM(s) IntraMuscular once  heparin   Injectable 5000 Unit(s) SubCutaneous every 8 hours  insulin glargine Injectable (LANTUS) 14 Unit(s) SubCutaneous <User Schedule>  insulin lispro (ADMELOG) corrective regimen sliding scale   SubCutaneous every 6 hours  levETIRAcetam  Solution 250 milliGRAM(s) Oral two times a day  melatonin 6 milliGRAM(s) Oral at bedtime  pantoprazole   Suspension 40 milliGRAM(s) Oral every 12 hours  petrolatum Ophthalmic Ointment 1 Application(s) Left EYE at bedtime  polyethylene glycol 3350 17 Gram(s) Oral daily  QUEtiapine 75 milliGRAM(s) Oral at bedtime  QUEtiapine 50 milliGRAM(s) Oral <User Schedule>  senna Syrup 10 milliLiter(s) Oral at bedtime  sucralfate suspension 1 Gram(s) Enteral Tube two times a day  ticagrelor 60 milliGRAM(s) Oral every 12 hours  valproic  acid Syrup 125 milliGRAM(s) Oral every 8 hours    MEDICATIONS  (PRN):  acetaminophen   Oral Liquid .. 650 milliGRAM(s) Oral every 6 hours PRN Temp greater or equal to 38C (100.4F), Mild Pain (1 - 3), Moderate Pain (4 - 6)  dextrose Oral Gel 15 Gram(s) Oral once PRN Blood Glucose LESS THAN 70 milliGRAM(s)/deciliter  LORazepam     Tablet 0.25 milliGRAM(s) Oral every 6 hours PRN severe agitation  QUEtiapine 12.5 milliGRAM(s) Oral every 6 hours PRN agitation      LABS:                        8.1    7.22  )-----------( 388      ( 26 Mar 2024 06:00 )             25.2     03-25    141  |  100  |  46<H>  ----------------------------<  122<H>  4.3   |  30  |  1.34<H>    Ca    9.0      25 Mar 2024 05:40  Phos  3.6     03-25  Mg     2.40     03-25    TPro  7.0  /  Alb  2.9<L>  /  TBili  0.2  /  DBili  <0.2  /  AST  18  /  ALT  11  /  AlkPhos  108  03-25      Urinalysis Basic - ( 25 Mar 2024 05:40 )    Color: x / Appearance: x / SG: x / pH: x  Gluc: 122 mg/dL / Ketone: x  / Bili: x / Urobili: x   Blood: x / Protein: x / Nitrite: x   Leuk Esterase: x / RBC: x / WBC x   Sq Epi: x / Non Sq Epi: x / Bacteria: x        Venous Blood Gas:  03-25 @ 05:40  7.46/45/43/32/69.7  VBG Lactate: --   CHIEF COMPLAINT: Patient is a 89y old  Male who presents with a chief complaint of COVID19, Chest pain (25 Mar 2024 14:07)      INTERVAL EVENTS:   - PRN Seroquel given overnight. As per PCA at bedside, patient intermittently pulling at lines.     ROS: Seen by bedside during AM rounds. Unable to obtain ROS 2/2 vent.    OBJECTIVE:  ICU Vital Signs Last 24 Hrs  T(C): 36.6 (26 Mar 2024 04:56), Max: 36.6 (26 Mar 2024 04:56)  T(F): 97.9 (26 Mar 2024 04:56), Max: 97.9 (26 Mar 2024 04:56)  HR: 86 (26 Mar 2024 04:56) (78 - 91)  BP: 115/61 (26 Mar 2024 04:56) (113/43 - 135/56)  BP(mean): --  ABP: --  ABP(mean): --  RR: 16 (26 Mar 2024 04:56) (16 - 24)  SpO2: 98% (26 Mar 2024 04:56) (98% - 100%)    O2 Parameters below as of 26 Mar 2024 04:56  Patient On (Oxygen Delivery Method): ventilator          Mode: AC/ CMV (Assist Control/ Continuous Mandatory Ventilation), RR (machine): 16, TV (machine): 400, FiO2: 30, PEEP: 5, MAP: 13, PIP: 28    03-25 @ 07:01  -  03-26 @ 07:00  --------------------------------------------------------  IN: 1150 mL / OUT: 1112.5 mL / NET: 37.5 mL      CAPILLARY BLOOD GLUCOSE      POCT Blood Glucose.: 131 mg/dL (26 Mar 2024 04:31)      PHYSICAL EXAM:  General: NAD  Neck: no JVD, trach to vent, site is c/d/i   Respiratory: coarse breath sounds b/l, no rales or wheezing  Cardiovascular: normal s1, s2, regular rate and rhythm  Abdomen: soft, non tender, +PEG, +Perc Lynsey tube   Extremities: no LE edema, wearing SCDs  Skin: Warm and dry   Neurological: Awake/alert, moving extremities freely  Psychiatry: No agitation, allows provider to perform exam w/o issues     Mode: AC/ CMV (Assist Control/ Continuous Mandatory Ventilation)  RR (machine): 16  TV (machine): 400  FiO2: 30  PEEP: 5  MAP: 13  PIP: 28      HOSPITAL MEDICATIONS:  MEDICATIONS  (STANDING):  albuterol/ipratropium for Nebulization 3 milliLiter(s) Nebulizer every 12 hours  artificial  tears Solution 1 Drop(s) Left EYE four times a day  aspirin  chewable 81 milliGRAM(s) Enteral Tube daily  carvedilol 6.25 milliGRAM(s) Oral every 12 hours  chlorhexidine 0.12% Liquid 15 milliLiter(s) Oral Mucosa every 12 hours  chlorhexidine 2% Cloths 1 Application(s) Topical daily  dextrose 5%. 1000 milliLiter(s) (100 mL/Hr) IV Continuous <Continuous>  dextrose 5%. 1000 milliLiter(s) (50 mL/Hr) IV Continuous <Continuous>  dextrose 50% Injectable 25 Gram(s) IV Push once  dextrose 50% Injectable 12.5 Gram(s) IV Push once  dextrose 50% Injectable 25 Gram(s) IV Push once  doxazosin 2 milliGRAM(s) Oral at bedtime  escitalopram Solution 10 milliGRAM(s) Oral daily  furosemide    Tablet 20 milliGRAM(s) Oral daily  glucagon  Injectable 1 milliGRAM(s) IntraMuscular once  heparin   Injectable 5000 Unit(s) SubCutaneous every 8 hours  insulin glargine Injectable (LANTUS) 14 Unit(s) SubCutaneous <User Schedule>  insulin lispro (ADMELOG) corrective regimen sliding scale   SubCutaneous every 6 hours  levETIRAcetam  Solution 250 milliGRAM(s) Oral two times a day  melatonin 6 milliGRAM(s) Oral at bedtime  pantoprazole   Suspension 40 milliGRAM(s) Oral every 12 hours  petrolatum Ophthalmic Ointment 1 Application(s) Left EYE at bedtime  polyethylene glycol 3350 17 Gram(s) Oral daily  QUEtiapine 75 milliGRAM(s) Oral at bedtime  QUEtiapine 50 milliGRAM(s) Oral <User Schedule>  senna Syrup 10 milliLiter(s) Oral at bedtime  sucralfate suspension 1 Gram(s) Enteral Tube two times a day  ticagrelor 60 milliGRAM(s) Oral every 12 hours  valproic  acid Syrup 125 milliGRAM(s) Oral every 8 hours    MEDICATIONS  (PRN):  acetaminophen   Oral Liquid .. 650 milliGRAM(s) Oral every 6 hours PRN Temp greater or equal to 38C (100.4F), Mild Pain (1 - 3), Moderate Pain (4 - 6)  dextrose Oral Gel 15 Gram(s) Oral once PRN Blood Glucose LESS THAN 70 milliGRAM(s)/deciliter  LORazepam     Tablet 0.25 milliGRAM(s) Oral every 6 hours PRN severe agitation  QUEtiapine 12.5 milliGRAM(s) Oral every 6 hours PRN agitation      LABS:                        8.1    7.22  )-----------( 388      ( 26 Mar 2024 06:00 )             25.2     03-25    141  |  100  |  46<H>  ----------------------------<  122<H>  4.3   |  30  |  1.34<H>    Ca    9.0      25 Mar 2024 05:40  Phos  3.6     03-25  Mg     2.40     03-25    TPro  7.0  /  Alb  2.9<L>  /  TBili  0.2  /  DBili  <0.2  /  AST  18  /  ALT  11  /  AlkPhos  108  03-25      Urinalysis Basic - ( 25 Mar 2024 05:40 )    Color: x / Appearance: x / SG: x / pH: x  Gluc: 122 mg/dL / Ketone: x  / Bili: x / Urobili: x   Blood: x / Protein: x / Nitrite: x   Leuk Esterase: x / RBC: x / WBC x   Sq Epi: x / Non Sq Epi: x / Bacteria: x        Venous Blood Gas:  03-25 @ 05:40  7.46/45/43/32/69.7  VBG Lactate: --

## 2024-03-26 NOTE — PROGRESS NOTE ADULT - ASSESSMENT
IMPRESSION:  89M w/ DM2, HTN, CVA, PAD, CKD3, and CAD-CABG, 12/23/23 p/w COVID19 infection, c/b respiratory failure/hypotension; now s/p tracheostomy    (1)Renal - CKD3; superimposed mild prerenal azotemia - numbers fluctuating based on hemodynamic status, slightly higher   (2)CV - now off pressors and midodrine, BP improved/stable    (3)Pulm - vent-dependent   (4)ID - afebrile, s/p zosyn   (5)GI - s/p PEG (2/20)  (6)Hypernatremia - possibly due to diuresis, Na+ improved    RECOMMEND:   (1)a/w lasix 20 mg iv  PRN   (2)flush PEG as needed   (3)Continue meds for GFR 40-50ml/min  (4)last  aranesp 60 mcg Sq x 1 on  3/18/24, schedule aranesp 60 mcg SQ today           Elsa Nunez  [facility]  Office: [phone]

## 2024-03-26 NOTE — PROGRESS NOTE ADULT - NS ATTEND AMEND GEN_ALL_CORE FT
agree with above  calm today with the am seroquel dosing. correct qtc remains <500  overnight, which is time the family is concerned about as they will not be able to stay with him overnight at vent facility - still on occasion  will add a standing low dose ativan at bedtime  plan for d/c tomorrow

## 2024-03-26 NOTE — PROGRESS NOTE ADULT - SUBJECTIVE AND OBJECTIVE BOX
NEPHROLOGY     Patient seen and examined with staff at bedside, pt lin loya, no acute distress.   calm ,1:1  family at bedside       ROS ; unable to take due to mentation   MEDICATIONS  (STANDING):  albuterol/ipratropium for Nebulization 3 milliLiter(s) Nebulizer every 12 hours  artificial  tears Solution 1 Drop(s) Left EYE four times a day  aspirin  chewable 81 milliGRAM(s) Enteral Tube daily  carvedilol 6.25 milliGRAM(s) Oral every 12 hours  chlorhexidine 0.12% Liquid 15 milliLiter(s) Oral Mucosa every 12 hours  chlorhexidine 2% Cloths 1 Application(s) Topical daily  dextrose 5%. 1000 milliLiter(s) (100 mL/Hr) IV Continuous <Continuous>  dextrose 5%. 1000 milliLiter(s) (50 mL/Hr) IV Continuous <Continuous>  dextrose 50% Injectable 25 Gram(s) IV Push once  dextrose 50% Injectable 25 Gram(s) IV Push once  dextrose 50% Injectable 12.5 Gram(s) IV Push once  divalproex Sprinkle 125 milliGRAM(s) Oral two times a day  doxazosin 2 milliGRAM(s) Oral at bedtime  escitalopram Solution 10 milliGRAM(s) Oral daily  furosemide    Tablet 20 milliGRAM(s) Oral daily  glucagon  Injectable 1 milliGRAM(s) IntraMuscular once  heparin   Injectable 5000 Unit(s) SubCutaneous every 8 hours  hydrALAZINE 50 milliGRAM(s) Oral every 8 hours  insulin glargine Injectable (LANTUS) 14 Unit(s) SubCutaneous <User Schedule>  insulin lispro (ADMELOG) corrective regimen sliding scale   SubCutaneous every 6 hours  levETIRAcetam  Solution 500 milliGRAM(s) Oral two times a day  melatonin 6 milliGRAM(s) Oral at bedtime  pantoprazole   Suspension 40 milliGRAM(s) Oral every 12 hours  petrolatum Ophthalmic Ointment 1 Application(s) Left EYE at bedtime  polyethylene glycol 3350 17 Gram(s) Oral daily  senna Syrup 10 milliLiter(s) Oral at bedtime  sucralfate suspension 1 Gram(s) Enteral Tube two times a day  ticagrelor 60 milliGRAM(s) Oral every 12 hours    VITALS:  T(C): , Max: 37 (03-11-24 @ 11:51)  T(F): , Max: 98.6 (03-11-24 @ 11:51)  HR: 95 (03-12-24 @ 11:30)  BP: 113/52 (03-12-24 @ 09:00)  BP(mean): 57 (03-12-24 @ 09:00)  RR: 18 (03-12-24 @ 09:00)  SpO2: 97% (03-12-24 @ 11:30)  Wt(kg): --  I and O's:    03-11 @ 07:01  -  03-12 @ 07:00  --------------------------------------------------------  IN: 1450 mL / OUT: 20 mL / NET: 1430 mL    PHYSICAL EXAM:  Constitutional: trach to vent   HEENT: +trach  Respiratory: coarse bs   Cardiovascular: normal s1s2  Gastrointestinal: BS+, soft, NT/ND +PEG  Extremities:+ peripheral edema  : + external catheter   Skin: No rashes    LABS:                        8.0    8.32  )-----------( 340      ( 12 Mar 2024 05:30 )             25.5     03-12    137  |  97<L>  |  50<H>  ----------------------------<  190<H>  4.7   |  30  |  1.50<H>    Ca    9.0      12 Mar 2024 05:30  Phos  3.8     03-12  Mg     2.50     03-12    TPro  7.2  /  Alb  3.0<L>  /  TBili  0.3  /  DBili  <0.2  /  AST  22  /  ALT  19  /  AlkPhos  176<H>  03-12      Urine Studies:  Urinalysis Basic - ( 12 Mar 2024 05:30 )    Color: x / Appearance: x / SG: x / pH: x  Gluc: 190 mg/dL / Ketone: x  / Bili: x / Urobili: x   Blood: x / Protein: x / Nitrite: x   Leuk Esterase: x / RBC: x / WBC x   Sq Epi: x / Non Sq Epi: x / Bacteria: x    RADIOLOGY & ADDITIONAL STUDIES:    rad< from: US Duplex Venous Lower Ext Complete, Bilateral (03.11.24 @ 19:29) >  IMPRESSION:  No evidence of deep venous thrombosis in either lower extremity.    Increased venous pulsatility suggestive of right-sided cardiac   dysfunction.        --- End of Report ---      ADEBAYO PALACIO MD; Attending Radiologist  This document has been electronically signed. Mar 11 2024  7:37PM    < end of copied text >   NEPHROLOGY     Patient seen and examined with staff at bedside, pt trach to vent, no acute distress.   calm tolerating cpap   family at bedside       ROS ; unable to take due to mentation   MEDICATIONS  (STANDING):  albuterol/ipratropium for Nebulization 3 milliLiter(s) Nebulizer every 12 hours  artificial  tears Solution 1 Drop(s) Left EYE four times a day  aspirin  chewable 81 milliGRAM(s) Enteral Tube daily  carvedilol 6.25 milliGRAM(s) Oral every 12 hours  chlorhexidine 0.12% Liquid 15 milliLiter(s) Oral Mucosa every 12 hours  chlorhexidine 2% Cloths 1 Application(s) Topical daily  dextrose 5%. 1000 milliLiter(s) (100 mL/Hr) IV Continuous <Continuous>  dextrose 5%. 1000 milliLiter(s) (50 mL/Hr) IV Continuous <Continuous>  dextrose 50% Injectable 25 Gram(s) IV Push once  dextrose 50% Injectable 25 Gram(s) IV Push once  dextrose 50% Injectable 12.5 Gram(s) IV Push once  divalproex Sprinkle 125 milliGRAM(s) Oral two times a day  doxazosin 2 milliGRAM(s) Oral at bedtime  escitalopram Solution 10 milliGRAM(s) Oral daily  furosemide    Tablet 20 milliGRAM(s) Oral daily  glucagon  Injectable 1 milliGRAM(s) IntraMuscular once  heparin   Injectable 5000 Unit(s) SubCutaneous every 8 hours  hydrALAZINE 50 milliGRAM(s) Oral every 8 hours  insulin glargine Injectable (LANTUS) 14 Unit(s) SubCutaneous <User Schedule>  insulin lispro (ADMELOG) corrective regimen sliding scale   SubCutaneous every 6 hours  levETIRAcetam  Solution 500 milliGRAM(s) Oral two times a day  melatonin 6 milliGRAM(s) Oral at bedtime  pantoprazole   Suspension 40 milliGRAM(s) Oral every 12 hours  petrolatum Ophthalmic Ointment 1 Application(s) Left EYE at bedtime  polyethylene glycol 3350 17 Gram(s) Oral daily  senna Syrup 10 milliLiter(s) Oral at bedtime  sucralfate suspension 1 Gram(s) Enteral Tube two times a day  ticagrelor 60 milliGRAM(s) Oral every 12 hours    VITALS:  T(C): , Max: 37 (03-11-24 @ 11:51)  T(F): , Max: 98.6 (03-11-24 @ 11:51)  HR: 95 (03-12-24 @ 11:30)  BP: 113/52 (03-12-24 @ 09:00)  BP(mean): 57 (03-12-24 @ 09:00)  RR: 18 (03-12-24 @ 09:00)  SpO2: 97% (03-12-24 @ 11:30)  Wt(kg): --  I and O's:    03-11 @ 07:01  -  03-12 @ 07:00  --------------------------------------------------------  IN: 1450 mL / OUT: 20 mL / NET: 1430 mL    PHYSICAL EXAM:  Constitutional: trach to vent   HEENT: +trach  Respiratory: coarse bs   Cardiovascular: normal s1s2  Gastrointestinal: BS+, soft, NT/ND +PEG  Extremities:+ peripheral edema  : + external catheter   Skin: No rashes    LABS:                        8.0    8.32  )-----------( 340      ( 12 Mar 2024 05:30 )             25.5     03-12    137  |  97<L>  |  50<H>  ----------------------------<  190<H>  4.7   |  30  |  1.50<H>    Ca    9.0      12 Mar 2024 05:30  Phos  3.8     03-12  Mg     2.50     03-12    TPro  7.2  /  Alb  3.0<L>  /  TBili  0.3  /  DBili  <0.2  /  AST  22  /  ALT  19  /  AlkPhos  176<H>  03-12      Urine Studies:  Urinalysis Basic - ( 12 Mar 2024 05:30 )    Color: x / Appearance: x / SG: x / pH: x  Gluc: 190 mg/dL / Ketone: x  / Bili: x / Urobili: x   Blood: x / Protein: x / Nitrite: x   Leuk Esterase: x / RBC: x / WBC x   Sq Epi: x / Non Sq Epi: x / Bacteria: x    RADIOLOGY & ADDITIONAL STUDIES:    rad< from: US Duplex Venous Lower Ext Complete, Bilateral (03.11.24 @ 19:29) >  IMPRESSION:  No evidence of deep venous thrombosis in either lower extremity.    Increased venous pulsatility suggestive of right-sided cardiac   dysfunction.        --- End of Report ---      ADEBAYO PALACIO MD; Attending Radiologist  This document has been electronically signed. Mar 11 2024  7:37PM    < end of copied text >

## 2024-03-27 ENCOUNTER — TRANSCRIPTION ENCOUNTER (OUTPATIENT)
Age: 89
End: 2024-03-27

## 2024-03-27 VITALS — OXYGEN SATURATION: 100 % | HEART RATE: 79 BPM

## 2024-03-27 LAB
ALBUMIN SERPL ELPH-MCNC: 2.9 G/DL — LOW (ref 3.3–5)
ALP SERPL-CCNC: 102 U/L — SIGNIFICANT CHANGE UP (ref 40–120)
ALT FLD-CCNC: 11 U/L — SIGNIFICANT CHANGE UP (ref 4–41)
ANION GAP SERPL CALC-SCNC: 11 MMOL/L — SIGNIFICANT CHANGE UP (ref 7–14)
ANION GAP SERPL CALC-SCNC: 13 MMOL/L — SIGNIFICANT CHANGE UP (ref 7–14)
APTT BLD: 29.9 SEC — SIGNIFICANT CHANGE UP (ref 24.5–35.6)
AST SERPL-CCNC: 19 U/L — SIGNIFICANT CHANGE UP (ref 4–40)
BASOPHILS # BLD AUTO: 0.05 K/UL — SIGNIFICANT CHANGE UP (ref 0–0.2)
BASOPHILS NFR BLD AUTO: 0.8 % — SIGNIFICANT CHANGE UP (ref 0–2)
BILIRUB SERPL-MCNC: 0.2 MG/DL — SIGNIFICANT CHANGE UP (ref 0.2–1.2)
BUN SERPL-MCNC: 46 MG/DL — HIGH (ref 7–23)
BUN SERPL-MCNC: 46 MG/DL — HIGH (ref 7–23)
CALCIUM SERPL-MCNC: 8.9 MG/DL — SIGNIFICANT CHANGE UP (ref 8.4–10.5)
CALCIUM SERPL-MCNC: 9 MG/DL — SIGNIFICANT CHANGE UP (ref 8.4–10.5)
CHLORIDE SERPL-SCNC: 100 MMOL/L — SIGNIFICANT CHANGE UP (ref 98–107)
CHLORIDE SERPL-SCNC: 100 MMOL/L — SIGNIFICANT CHANGE UP (ref 98–107)
CO2 SERPL-SCNC: 27 MMOL/L — SIGNIFICANT CHANGE UP (ref 22–31)
CO2 SERPL-SCNC: 29 MMOL/L — SIGNIFICANT CHANGE UP (ref 22–31)
CREAT SERPL-MCNC: 1.38 MG/DL — HIGH (ref 0.5–1.3)
CREAT SERPL-MCNC: 1.39 MG/DL — HIGH (ref 0.5–1.3)
EGFR: 48 ML/MIN/1.73M2 — LOW
EGFR: 49 ML/MIN/1.73M2 — LOW
EOSINOPHIL # BLD AUTO: 0.49 K/UL — SIGNIFICANT CHANGE UP (ref 0–0.5)
EOSINOPHIL NFR BLD AUTO: 7.6 % — HIGH (ref 0–6)
GLUCOSE BLDC GLUCOMTR-MCNC: 136 MG/DL — HIGH (ref 70–99)
GLUCOSE BLDC GLUCOMTR-MCNC: 154 MG/DL — HIGH (ref 70–99)
GLUCOSE SERPL-MCNC: 136 MG/DL — HIGH (ref 70–99)
GLUCOSE SERPL-MCNC: 137 MG/DL — HIGH (ref 70–99)
HCT VFR BLD CALC: 28.3 % — LOW (ref 39–50)
HGB BLD-MCNC: 9 G/DL — LOW (ref 13–17)
IANC: 2.73 K/UL — SIGNIFICANT CHANGE UP (ref 1.8–7.4)
IMM GRANULOCYTES NFR BLD AUTO: 1.1 % — HIGH (ref 0–0.9)
INR BLD: 1.05 RATIO — SIGNIFICANT CHANGE UP (ref 0.85–1.18)
INR BLD: 1.15 RATIO — SIGNIFICANT CHANGE UP (ref 0.85–1.18)
LYMPHOCYTES # BLD AUTO: 2.34 K/UL — SIGNIFICANT CHANGE UP (ref 1–3.3)
LYMPHOCYTES # BLD AUTO: 36.1 % — SIGNIFICANT CHANGE UP (ref 13–44)
MAGNESIUM SERPL-MCNC: 2.3 MG/DL — SIGNIFICANT CHANGE UP (ref 1.6–2.6)
MAGNESIUM SERPL-MCNC: 2.5 MG/DL — SIGNIFICANT CHANGE UP (ref 1.6–2.6)
MCHC RBC-ENTMCNC: 28.4 PG — SIGNIFICANT CHANGE UP (ref 27–34)
MCHC RBC-ENTMCNC: 31.8 GM/DL — LOW (ref 32–36)
MCV RBC AUTO: 89.3 FL — SIGNIFICANT CHANGE UP (ref 80–100)
MONOCYTES # BLD AUTO: 0.8 K/UL — SIGNIFICANT CHANGE UP (ref 0–0.9)
MONOCYTES NFR BLD AUTO: 12.3 % — SIGNIFICANT CHANGE UP (ref 2–14)
NEUTROPHILS # BLD AUTO: 2.73 K/UL — SIGNIFICANT CHANGE UP (ref 1.8–7.4)
NEUTROPHILS NFR BLD AUTO: 42.1 % — LOW (ref 43–77)
NRBC # BLD: 0 /100 WBCS — SIGNIFICANT CHANGE UP (ref 0–0)
NRBC # FLD: 0 K/UL — SIGNIFICANT CHANGE UP (ref 0–0)
PHOSPHATE SERPL-MCNC: 3.5 MG/DL — SIGNIFICANT CHANGE UP (ref 2.5–4.5)
PHOSPHATE SERPL-MCNC: 3.6 MG/DL — SIGNIFICANT CHANGE UP (ref 2.5–4.5)
PLATELET # BLD AUTO: 433 K/UL — HIGH (ref 150–400)
POTASSIUM SERPL-MCNC: 4 MMOL/L — SIGNIFICANT CHANGE UP (ref 3.5–5.3)
POTASSIUM SERPL-MCNC: 4.5 MMOL/L — SIGNIFICANT CHANGE UP (ref 3.5–5.3)
POTASSIUM SERPL-SCNC: 4 MMOL/L — SIGNIFICANT CHANGE UP (ref 3.5–5.3)
POTASSIUM SERPL-SCNC: 4.5 MMOL/L — SIGNIFICANT CHANGE UP (ref 3.5–5.3)
PROT SERPL-MCNC: 6.8 G/DL — SIGNIFICANT CHANGE UP (ref 6–8.3)
PROTHROM AB SERPL-ACNC: 11.9 SEC — SIGNIFICANT CHANGE UP (ref 9.5–13)
PROTHROM AB SERPL-ACNC: 12.9 SEC — SIGNIFICANT CHANGE UP (ref 9.5–13)
RBC # BLD: 3.17 M/UL — LOW (ref 4.2–5.8)
RBC # FLD: 15.7 % — HIGH (ref 10.3–14.5)
SODIUM SERPL-SCNC: 140 MMOL/L — SIGNIFICANT CHANGE UP (ref 135–145)
SODIUM SERPL-SCNC: 140 MMOL/L — SIGNIFICANT CHANGE UP (ref 135–145)
WBC # BLD: 6.48 K/UL — SIGNIFICANT CHANGE UP (ref 3.8–10.5)
WBC # FLD AUTO: 6.48 K/UL — SIGNIFICANT CHANGE UP (ref 3.8–10.5)

## 2024-03-27 PROCEDURE — 93010 ELECTROCARDIOGRAM REPORT: CPT

## 2024-03-27 PROCEDURE — 99233 SBSQ HOSP IP/OBS HIGH 50: CPT | Mod: FS

## 2024-03-27 RX ORDER — MEMANTINE HYDROCHLORIDE 10 MG/1
1 TABLET ORAL
Refills: 0 | DISCHARGE

## 2024-03-27 RX ORDER — CARVEDILOL PHOSPHATE 80 MG/1
1 CAPSULE, EXTENDED RELEASE ORAL
Qty: 0 | Refills: 0 | DISCHARGE
Start: 2024-03-27

## 2024-03-27 RX ORDER — ACETAMINOPHEN 500 MG
20.31 TABLET ORAL
Qty: 0 | Refills: 0 | DISCHARGE
Start: 2024-03-27

## 2024-03-27 RX ORDER — LEVETIRACETAM 250 MG/1
1.25 TABLET, FILM COATED ORAL
Qty: 0 | Refills: 0 | DISCHARGE
Start: 2024-03-27

## 2024-03-27 RX ORDER — INSULIN ASPART 100 [IU]/ML
3 INJECTION, SOLUTION SUBCUTANEOUS
Refills: 0 | DISCHARGE

## 2024-03-27 RX ORDER — LANOLIN ALCOHOL/MO/W.PET/CERES
1 CREAM (GRAM) TOPICAL
Refills: 0 | DISCHARGE
Start: 2024-03-27

## 2024-03-27 RX ORDER — DOXAZOSIN MESYLATE 4 MG
1 TABLET ORAL
Qty: 0 | Refills: 0 | DISCHARGE
Start: 2024-03-27

## 2024-03-27 RX ORDER — QUETIAPINE FUMARATE 200 MG/1
1 TABLET, FILM COATED ORAL
Qty: 0 | Refills: 0 | DISCHARGE
Start: 2024-03-27

## 2024-03-27 RX ORDER — POLYETHYLENE GLYCOL 3350 17 G/17G
17 POWDER, FOR SOLUTION ORAL
Qty: 0 | Refills: 0 | DISCHARGE
Start: 2024-03-27

## 2024-03-27 RX ORDER — ROSUVASTATIN CALCIUM 5 MG/1
1 TABLET ORAL
Qty: 0 | Refills: 0 | DISCHARGE

## 2024-03-27 RX ORDER — RANOLAZINE 500 MG/1
500 TABLET, FILM COATED, EXTENDED RELEASE ORAL
Refills: 0 | DISCHARGE

## 2024-03-27 RX ORDER — INSULIN GLARGINE 100 [IU]/ML
17 INJECTION, SOLUTION SUBCUTANEOUS
Refills: 0 | DISCHARGE
Start: 2024-03-27

## 2024-03-27 RX ORDER — SENNA PLUS 8.6 MG/1
10 TABLET ORAL
Qty: 0 | Refills: 0 | DISCHARGE
Start: 2024-03-27

## 2024-03-27 RX ORDER — IPRATROPIUM/ALBUTEROL SULFATE 18-103MCG
3 AEROSOL WITH ADAPTER (GRAM) INHALATION
Refills: 0 | DISCHARGE
Start: 2024-03-27

## 2024-03-27 RX ORDER — SUCRALFATE 1 G
10 TABLET ORAL
Qty: 0 | Refills: 0 | DISCHARGE
Start: 2024-03-27

## 2024-03-27 RX ORDER — LEVETIRACETAM 250 MG/1
1 TABLET, FILM COATED ORAL
Refills: 0 | DISCHARGE

## 2024-03-27 RX ORDER — ASPIRIN/CALCIUM CARB/MAGNESIUM 324 MG
1 TABLET ORAL
Qty: 0 | Refills: 0 | DISCHARGE
Start: 2024-03-27

## 2024-03-27 RX ORDER — ESCITALOPRAM OXALATE 10 MG/1
1 TABLET, FILM COATED ORAL
Qty: 0 | Refills: 0 | DISCHARGE

## 2024-03-27 RX ORDER — PANTOPRAZOLE SODIUM 20 MG/1
40 TABLET, DELAYED RELEASE ORAL
Qty: 0 | Refills: 0 | DISCHARGE
Start: 2024-03-27

## 2024-03-27 RX ORDER — VALPROIC ACID (AS SODIUM SALT) 250 MG/5ML
5 SOLUTION, ORAL ORAL
Refills: 0 | DISCHARGE
Start: 2024-03-27

## 2024-03-27 RX ORDER — METOPROLOL TARTRATE 50 MG
1 TABLET ORAL
Refills: 0 | DISCHARGE

## 2024-03-27 RX ORDER — ESCITALOPRAM OXALATE 10 MG/1
10 TABLET, FILM COATED ORAL
Qty: 0 | Refills: 0 | DISCHARGE
Start: 2024-03-27

## 2024-03-27 RX ORDER — QUETIAPINE FUMARATE 200 MG/1
3 TABLET, FILM COATED ORAL
Qty: 0 | Refills: 0 | DISCHARGE
Start: 2024-03-27

## 2024-03-27 RX ORDER — INSULIN LISPRO 100/ML
1 VIAL (ML) SUBCUTANEOUS
Qty: 0 | Refills: 0 | DISCHARGE
Start: 2024-03-27

## 2024-03-27 RX ORDER — FUROSEMIDE 40 MG
1 TABLET ORAL
Qty: 0 | Refills: 0 | DISCHARGE
Start: 2024-03-27

## 2024-03-27 RX ADMIN — Medication 81 MILLIGRAM(S): at 11:18

## 2024-03-27 RX ADMIN — CHLORHEXIDINE GLUCONATE 1 APPLICATION(S): 213 SOLUTION TOPICAL at 11:17

## 2024-03-27 RX ADMIN — LEVETIRACETAM 125 MILLIGRAM(S): 250 TABLET, FILM COATED ORAL at 05:00

## 2024-03-27 RX ADMIN — CHLORHEXIDINE GLUCONATE 15 MILLILITER(S): 213 SOLUTION TOPICAL at 05:00

## 2024-03-27 RX ADMIN — TICAGRELOR 60 MILLIGRAM(S): 90 TABLET ORAL at 05:00

## 2024-03-27 RX ADMIN — HEPARIN SODIUM 5000 UNIT(S): 5000 INJECTION INTRAVENOUS; SUBCUTANEOUS at 13:42

## 2024-03-27 RX ADMIN — HEPARIN SODIUM 5000 UNIT(S): 5000 INJECTION INTRAVENOUS; SUBCUTANEOUS at 05:01

## 2024-03-27 RX ADMIN — Medication 2: at 11:19

## 2024-03-27 RX ADMIN — PANTOPRAZOLE SODIUM 40 MILLIGRAM(S): 20 TABLET, DELAYED RELEASE ORAL at 05:00

## 2024-03-27 RX ADMIN — Medication 3 MILLILITER(S): at 08:40

## 2024-03-27 RX ADMIN — Medication 1 DROP(S): at 05:01

## 2024-03-27 RX ADMIN — Medication 125 MILLIGRAM(S): at 13:43

## 2024-03-27 RX ADMIN — CARVEDILOL PHOSPHATE 6.25 MILLIGRAM(S): 80 CAPSULE, EXTENDED RELEASE ORAL at 05:01

## 2024-03-27 RX ADMIN — POLYETHYLENE GLYCOL 3350 17 GRAM(S): 17 POWDER, FOR SOLUTION ORAL at 11:17

## 2024-03-27 RX ADMIN — INSULIN GLARGINE 14 UNIT(S): 100 INJECTION, SOLUTION SUBCUTANEOUS at 11:22

## 2024-03-27 RX ADMIN — Medication 125 MILLIGRAM(S): at 05:00

## 2024-03-27 RX ADMIN — ESCITALOPRAM OXALATE 10 MILLIGRAM(S): 10 TABLET, FILM COATED ORAL at 11:17

## 2024-03-27 RX ADMIN — Medication 1 GRAM(S): at 05:00

## 2024-03-27 RX ADMIN — Medication 20 MILLIGRAM(S): at 05:00

## 2024-03-27 RX ADMIN — Medication 1 DROP(S): at 11:18

## 2024-03-27 NOTE — DISCHARGE NOTE NURSING/CASE MANAGEMENT/SOCIAL WORK - NSDPLANG ASIS_GEN_ALL_CORE
Airway  Performed by: Daniel Krueger CRNA  Authorized by: Ebenezer George MD     Final Airway Type:  Endotracheal airway  Final Endotracheal Airway*:  ETT  ETT Size (mm)*:  7.5  Cuff*:  Regular  Technique Used for Successful ETT Placement:  Direct laryngoscopy  Devices/Methods Used in Placement*:  Mask and Stylet  Intubation Procedure*:  Preoxygenation, ETCO2, Atraumatic, Dentition Unchanged and Phaynx Clear  Insertion Site:  Oral  Blade Type*:  MAC  Blade Size*:  3  Cuff Volume (mL):  8  Measured from*:  Lips  Secured at (cm)*:  24  Placement Verified by: auscultation and capnometry    Glottic View*:  2 - partial view of glottis (2a)  Attempts*:  1  Number of Other Approaches Attempted:  0   Patient Identified, Procedure confirmed, Emergency equipment available and Safety protocols followed  Location:  OR  Urgency:  Elective  Difficult Airway: No    Indications for Airway Management:  Anesthesia  Spontaneous Ventilation: absent    Sedation Level:  Anesthetized  Mask Difficulty Assessment:  1 - vent by mask  Performed By:  CRNA  CRNA:  Daniel Krueger CRNA         No

## 2024-03-27 NOTE — PROGRESS NOTE ADULT - ASSESSMENT
IMPRESSION:  89M w/ DM2, HTN, CVA, PAD, CKD3, and CAD-CABG, 12/23/23 p/w COVID19 infection, c/b respiratory failure/hypotension; now s/p tracheostomy    (1)Renal - CKD3; superimposed mild prerenal azotemia - numbers fluctuating based on hemodynamic status, slightly higher   (2)CV - now off pressors and midodrine, BP improved/stable    (3)Pulm - vent-dependent   (4)ID - afebrile, s/p zosyn   (5)GI - s/p PEG (2/20)  (6)Hypernatremia - possibly due to diuresis, Na+ improved    RECOMMEND:   (1)a/w lasix 20 mg daily via PEG  (2)flush PEG as needed ,cont free water flushes   (3)Continue meds for GFR 40-50ml/min  (4)last  aranesp 60 mcg Sq x 1 on  3/18/24, give  aranesp 60 mcg SQ today .          Elsa Nunez  [facility]  Office: [phone]       IMPRESSION:  89M w/ DM2, HTN, CVA, PAD, CKD3, and CAD-CABG, 12/23/23 p/w COVID19 infection, c/b respiratory failure/hypotension; now s/p tracheostomy    (1)Renal - CKD3; superimposed mild prerenal azotemia - numbers fluctuating based on hemodynamic status, slightly higher   (2)CV - now off pressors and midodrine, BP improved/stable    (3)Pulm - vent-dependent   (4)ID - afebrile, s/p zosyn   (5)GI - s/p PEG (2/20)  (6)Hypernatremia - possibly due to diuresis, Na+ improved    RECOMMEND:   (1)a/w lasix 20 mg daily via PEG  (2)flush PEG as needed ,cont free water flushes   (3)Continue meds for GFR 40-50ml/min  (4)last  aranesp 60 mcg Sq x 1 on  3/18/24, give  aranesp 60 mcg SQ today .  (5) Dc planning         [facility] Mayra  [facility]  Office: [phone]

## 2024-03-27 NOTE — PROGRESS NOTE ADULT - NS ATTEND AMEND GEN_ALL_CORE FT
agree with above  pt doing well  agitation improved, more awake and alert, interactive  repeat EKG today with qtc <500  pt is stable for d/c to vent facility, and has accepting facilities  d/w pt's son at bedside

## 2024-03-27 NOTE — PROGRESS NOTE ADULT - ASSESSMENT
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 Course ultimately complicated by progressive WOB and O2 demand and patient intubated and transferred to MICU (1/22). Course further complicated by acute cholecystitis and s/p Perc Lynsey with IR on (1/26), HFrEF 38, pulmonary edema, superimposed PNA, failed extubation and prolonged vent time and s/p trach (2/13). Patient transferred to RCU (2/16) and s/p PEG (2/20). Course complicated by volume overload and diuresis and GDMT continued and HFrEF 45 with improvement. Course further complicated by agitation from underlying vascular dementia/ hospital delirium.     NEUROLOGY   # Encephalopathy   - Hx of CVA with no residual deficits per family, however per family worsening confusion at home over the past 6 months (becoming disoriented to time) but prior to admission has been more confused, talking about seeing people that are not present.  - CTH (1/31) with no evidence of acute infarct  - Continue on ASA and Brilinta  - Holding Neurontin, Memantine and Oxycodone in setting of somnolence  - 3/17: obtunded overnight, CT Head: Mild chronic microvascular changes without evidence of an acute transcortical infarction or hemorrhage.  - VBG and Ammonia level grossly unremarkable    # ICA stenosis   - CTA NECK (1/31) with moderate to severe narrowing left internal carotid at the origin. Severe narrowing right internal carotid by a tandem lesion 1.5 cm above the bifurcation with a narrowed internal carotid beyond the lesion. Extensive calcified plaque both cavernous and supraclinoid carotid arteries with narrowing but no occlusion. Mild narrowing proximal right M1 segment. Moderate narrowing both vertebral V4 segments. No perfusion abnormality at the Tmax 6 second threshold, but moderate hypoperfusion in the right MCA territory at the 4 second threshold.   - Continue on ASA and Brilinta    # Myoclonic jerks   - Hx of myoclonic jerks post CVAs   - EEG (1/18-2/6) negative for seizures  - Continue on Keppra and per neuro/ psych wishes to wean, family in agreement   - Currently down titrating home Keppra 500 BID, 250 mg BID (3/13 - 3/26), 125 BID (3/26 - )    # Depression/ Insomnia  - Continue on Lexapro per home medication   - Course complicated by agitation and pulling on tubes   - Home Seroquel discontinued as patient now on Valproic acid syrup   - S/p Ativan 0.5mg PO Q6H PRN and Haldol 0.25 mg Q6H for severe agitation, then s/p IM Zypexa 1.25 Q8H PRN for agitation    - Supportive care     # Agitation in setting of delirium / underlying vascular dementia   - C/w  mg TID,  following   - monitor platelets, LFTs while on Depakote   - Recently, Pt had responded much better to Ativan than Zyprexa for agitation, however became obtunded, so Ativan d/c Ativan 0.5mg Q6H PRN  - 3/18 Start Seroquel 25 mg in AM and Seroquel 50 mg QHS   - 3/20 restless /agitated overnight Ativan x 2 no effect - DW psych can do Seroquel 12.5 PRN/ increase night Seroquel to 75mg - continues on 1:1 restless with  family  - 3/21-3/22 Calm, remains delirious no recent episodes of agitation   - Became agitated with minimal response to Seroquel and required Ativan with improvement (overnight 3/22)  - 3/25 Increased AM Seroquel to 50 mg and c/w Seroquel 75 mg QHS     CARDIOLOGY   # Vasoplegic vs septic shock  # Hx of HTN   - Weaned off pressors in ICU and now with mild hypertension   - Continue on coreg and hydral as below   - Strict BP control given moderate AS  - Increased vascular congestion on CXR (3/9) so will give additional Lasix 20mg PO x1 (3/10)  - Diastolic bp low <50 hold Hydralazine and monitor bp - may need to decrease of dc   - Diastolic hypotension, Hydralazine discontinued (3/24)    # AFIB RVR   # Bifascicular Block with Medtronic PPM   - AFIB RVR episodes and PPM interrogated (2/20) by EP with similar episodes  - Previous admission in 6/2022 with cardiogenic syncope second to bifasicular block, however now with PPM and AV myron blockers ok.   - Continue on lopressor 12.5mg BID however replaced with coreg second to HFrEF   - AC discussed at length however with recent GIB will hold for now     # HFrEF 38 likely stress induced?   # Mild to moderate AS   - ECHO (12/2023) with EF 62 with normal LVSF and mild to moderate AS   - ECHO (2/2024) with EF 38, moderate LVSD with global LV hypokinesis, mildly reduced RVSF (TAPSE 1.3), mild to moderate MR, mild AR, and moderate AS.   - RPT ECHO (3/4) with EF 45 and mild to moderate LVSD   - Continue on Lasix 20 QD, coreg 6.25 and hydralazine 50 (increased 3/4)   - Hold isordil and up titrate above medications first   - Hydralazine discontinued given diastolic hypotension (3/24)    # CAD with CABG   - CATH (5/2022) with dLAD 70, SVG graft to OM1 with 90 in stent stenosis s/p PCI and GEMMA placement, and LIMA graft to mLAD with no disease.   - Continue on ASA and Brilinta    # Prolonged QTC   - ECG (3/2) with QTC 616ms (corrected using bazzet 490ms)   - ECG (3/3) with QTC 634ms (corrected using bazzet 490ms)   - ECG (3/11) with QTC 490ms, EKG (3/15) QTc 498ms, EKG (3/21) 486ms, EKG (3/23) 478, EKG (3/25) 508, EKG (3/26) 518 (corrected using bazzet 476ms)   - Monitor QTc/ daily EKGs       RESPIRATORY   # Acute respiratory failure second to SARS-COV-2 vs pulm edema vs PNA  - Presented with COVID complicated by sepsis second to acute lynsey and worsening respiratory failure second to pulmonary edema requiring intubation.   - Prolonged intubation and s/p tracheostomy (2/13)   - CT CHEST 1/16 with new small bilateral pleural effusions/ atelectasis/ patchy bilateral ground-glass opacities are consistent with pulmonary edema.  - Completed ABX and COVID regimen as below   - Continue on vent and allow SBT as tolerated   - Continue on nebs and hold further HTS given intermittent hemoptysis  - Continue on diuresis as above    # Mild hemoptysis likely from suction trauma   - s/p bronch (2/18) with no active bleed  - Hemoptysis improved with TXA and holding further HTS as above   - Tiny hemoptysis second to suction trauma imporving  - Careful with suctioning   - Recurrent hemoptysis (3/9) so ordered for TXA nebs again however hemoptysis appeared self limited and stopped before nebs even given    HEENT   # L eye subconjunctival hemorrhage   - Continue on artifical tears and Lacrilube   - Optho eval appreciated     GI  # Nutrition/ Dysphagia   - PEG placed by GI on 2/20 and tube feeds continued.   - Loose stools and Banatrol continued     # UGIB   - EGD (1/2) with esophagitis and bleeding Dieulafoy's lesion s/p clips.   - Continue on PPI and carafate     # SMA calcification   - CTA demonstrating mesenteric fat stranding associated with ascending/transverse colon  - Diagnostic laparoscopy (12/24) with small bowel and visible colon viable, some inflammation of omentum in RUQ  - SMA Angiogram and stent placed (12/25).   - Continue on ASA and Brilinta     # Acute Cholecystitis   - CT (12/26) with distended GB with cholelithiasis and sludge   - HIDA (12/29) POSITIVE for acute cholecystitis   - Case discussed with IR at length and s/p PERC LYNSEY (1/26)   - Completed zosyn course (12/24-12/31)   - PERC LYNSEY tube to stay in until surgical candidate for cholecystectomy to prevent recurrence   - Noted with some bloody perc lynsey output so IR consulted, recommending CT A/P to assess if still in proper position  - 3/24 CT A/P "gallblader decompressed with per lynsey tube, mod b/l pleural effusions"    / RENAL   # CKD   - CRE at baseline   - Monitor renal function and UOP   - Aranesp SQ x1 (3/8)    # Hypernatremia (resolved)   - s/p  Q4H  - Monitor closely with diuresis as above   - Stop FWF (3/15) given c/f worsening vol overload     INFECTIOUS DISEASE   # COVID with superimposed PNA   # ESBL Kleb PNA   - COVID POSITIVE (12/23) and completed remdesivir x 1 dose and paxlovid course.   - WOB worsened as above requiring intubation and cultures negative. Zosyn completed empirically however hypothermic (2/11) and BCx, UA, RVP and BAL negative.   - Completed additional zosyn (2/12-2/19) empirically   - Fever spike and thick secretions and SCX (2/26) with ESBL klebs.   - s/p Zosyn (2/27 - 2/29) but resistant and completed Erta (2/29 - 3/6)     HEME  # AOCD   - Monitor HH   - DVT PPX with HSQ     ENDOCRINE   # DM2 A1C 9.3   - Continue on Lantus 14 and ISS     SKIN/ TUBES   - Trach (2/13)   - PEG (2/20)   - PERC LYNSEY (1/26 - )     ETHICS   - FULL CODE     DISPO - vent facility and family aware. SW with accepting facility however pending available bed.  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 Course ultimately complicated by progressive WOB and O2 demand and patient intubated and transferred to MICU (1/22). Course further complicated by acute cholecystitis and s/p Perc Lynsey with IR on (1/26), HFrEF 38, pulmonary edema, superimposed PNA, failed extubation and prolonged vent time and s/p trach (2/13). Patient transferred to RCU (2/16) and s/p PEG (2/20). Course complicated by volume overload and diuresis and GDMT continued and HFrEF 45 with improvement. Course further complicated by agitation from underlying vascular dementia/ hospital delirium.     NEUROLOGY   # Encephalopathy   - Hx of CVA with no residual deficits per family, however per family worsening confusion at home over the past 6 months (becoming disoriented to time) but prior to admission has been more confused, talking about seeing people that are not present.  - CTH (1/31) with no evidence of acute infarct  - Continue on ASA and Brilinta  - Holding Neurontin, Memantine and Oxycodone in setting of somnolence  - 3/17: obtunded overnight, CT Head: Mild chronic microvascular changes without evidence of an acute transcortical infarction or hemorrhage.  - VBG and Ammonia level grossly unremarkable  - MS appears improved and now more calm today (3/27)    # ICA stenosis   - CTA NECK (1/31) with moderate to severe narrowing left internal carotid at the origin. Severe narrowing right internal carotid by a tandem lesion 1.5 cm above the bifurcation with a narrowed internal carotid beyond the lesion. Extensive calcified plaque both cavernous and supraclinoid carotid arteries with narrowing but no occlusion. Mild narrowing proximal right M1 segment. Moderate narrowing both vertebral V4 segments. No perfusion abnormality at the Tmax 6 second threshold, but moderate hypoperfusion in the right MCA territory at the 4 second threshold.   - Continue on ASA and Brilinta    # Myoclonic jerks   - Hx of myoclonic jerks post CVAs   - EEG (1/18-2/6) negative for seizures  - Continue on Keppra and per neuro/ psych wishes to wean, family in agreement   - Currently down titrating home Keppra 500 BID, 250 mg BID (3/13 - 3/26), 125 BID (3/26 - )    # Depression/ Insomnia  - Continue on Lexapro per home medication   - Course complicated by agitation and pulling on tubes   - Home Seroquel discontinued as patient now on Valproic acid syrup   - S/p Ativan 0.5mg PO Q6H PRN and Haldol 0.25 mg Q6H for severe agitation, then s/p IM Zypexa 1.25 Q8H PRN for agitation    - Supportive care     # Agitation in setting of delirium / underlying vascular dementia   - C/w  mg TID,  following   - monitor platelets, LFTs while on Depakote   - Recently, Pt had responded much better to Ativan than Zyprexa for agitation, however became obtunded, so Ativan d/c Ativan 0.5mg Q6H PRN  - 3/18 Start Seroquel 25 mg in AM and Seroquel 50 mg QHS   - 3/20 restless /agitated overnight Ativan x 2 no effect - DW psych can do Seroquel 12.5 PRN/ increase night Seroquel to 75mg - continues on 1:1 restless with  family  - 3/21-3/22 Calm, remains delirious no recent episodes of agitation   - Became agitated with minimal response to Seroquel and required Ativan with improvement (overnight 3/22)  - 3/25 Increased AM Seroquel to 50 mg and c/w Seroquel 75 mg QHS     CARDIOLOGY   # Vasoplegic vs septic shock  # Hx of HTN   - Weaned off pressors in ICU and now with mild hypertension   - Continue on coreg and hydral as below   - Strict BP control given moderate AS  - Increased vascular congestion on CXR (3/9) so will give additional Lasix 20mg PO x1 (3/10)  - Diastolic bp low <50 hold Hydralazine and monitor bp - may need to decrease of dc   - Diastolic hypotension, Hydralazine discontinued (3/24)    # AFIB RVR   # Bifascicular Block with Medtronic PPM   - AFIB RVR episodes and PPM interrogated (2/20) by EP with similar episodes  - Previous admission in 6/2022 with cardiogenic syncope second to bifasicular block, however now with PPM and AV myron blockers ok.   - Continue on lopressor 12.5mg BID however replaced with coreg second to HFrEF   - AC discussed at length however with recent GIB will hold for now     # HFrEF 38 likely stress induced?   # Mild to moderate AS   - ECHO (12/2023) with EF 62 with normal LVSF and mild to moderate AS   - ECHO (2/2024) with EF 38, moderate LVSD with global LV hypokinesis, mildly reduced RVSF (TAPSE 1.3), mild to moderate MR, mild AR, and moderate AS.   - RPT ECHO (3/4) with EF 45 and mild to moderate LVSD   - Continue on Lasix 20 QD, coreg 6.25 and hydralazine 50 (increased 3/4)   - Hold isordil and up titrate above medications first   - Hydralazine discontinued given diastolic hypotension (3/24)    # CAD with CABG   - CATH (5/2022) with dLAD 70, SVG graft to OM1 with 90 in stent stenosis s/p PCI and GEMMA placement, and LIMA graft to mLAD with no disease.   - Continue on ASA and Brilinta    # Prolonged QTC   - ECG (3/2) with QTC 616ms (corrected using bazzet 490ms)   - ECG (3/3) with QTC 634ms (corrected using bazzet 490ms)   - ECG (3/11) with QTC 490ms, EKG (3/15) QTc 498ms, EKG (3/21) 486ms, EKG (3/23) 478, EKG (3/25) 508, EKG (3/26) 518 (corrected using bazzet 476ms); EKG (3/27) QTc 482ms.  - Monitor QTc/ daily EKGs       RESPIRATORY   # Acute respiratory failure second to SARS-COV-2 vs pulm edema vs PNA  - Presented with COVID complicated by sepsis second to acute lynsey and worsening respiratory failure second to pulmonary edema requiring intubation.   - Prolonged intubation and s/p tracheostomy (2/13)   - CT CHEST 1/16 with new small bilateral pleural effusions/ atelectasis/ patchy bilateral ground-glass opacities are consistent with pulmonary edema.  - Completed ABX and COVID regimen as below   - Continue on vent and allow SBT as tolerated   - Continue on nebs and hold further HTS given intermittent hemoptysis  - Continue on diuresis as above    # Mild hemoptysis likely from suction trauma   - s/p bronch (2/18) with no active bleed  - Hemoptysis improved with TXA and holding further HTS as above   - Tiny hemoptysis second to suction trauma imporving  - Careful with suctioning   - Recurrent hemoptysis (3/9) so ordered for TXA nebs again however hemoptysis appeared self limited and stopped before nebs even given    HEENT   # L eye subconjunctival hemorrhage   - Continue on artifical tears and Lacrilube   - Optho eval appreciated     GI  # Nutrition/ Dysphagia   - PEG placed by GI on 2/20 and tube feeds continued.   - Loose stools and Banatrol continued     # UGIB   - EGD (1/2) with esophagitis and bleeding Dieulafoy's lesion s/p clips.   - Continue on PPI and carafate     # SMA calcification   - CTA demonstrating mesenteric fat stranding associated with ascending/transverse colon  - Diagnostic laparoscopy (12/24) with small bowel and visible colon viable, some inflammation of omentum in RUQ  - SMA Angiogram and stent placed (12/25).   - Continue on ASA and Brilinta     # Acute Cholecystitis   - CT (12/26) with distended GB with cholelithiasis and sludge   - HIDA (12/29) POSITIVE for acute cholecystitis   - Case discussed with IR at length and s/p PERC LYNSEY (1/26)   - Completed zosyn course (12/24-12/31)   - PERC LYNSEY tube to stay in until surgical candidate for cholecystectomy to prevent recurrence   - Noted with some bloody perc lynsey output so IR consulted, recommending CT A/P to assess if still in proper position  - 3/24 CT A/P "gallblader decompressed with per lynsey tube, mod b/l pleural effusions"    / RENAL   # CKD   - CRE at baseline   - Monitor renal function and UOP   - Aranesp SQ x1 (3/8)    # Hypernatremia (resolved)   - s/p  Q4H  - Monitor closely with diuresis as above   - Stop FWF (3/15) given c/f worsening vol overload     INFECTIOUS DISEASE   # COVID with superimposed PNA   # ESBL Kleb PNA   - COVID POSITIVE (12/23) and completed remdesivir x 1 dose and paxlovid course.   - WOB worsened as above requiring intubation and cultures negative. Zosyn completed empirically however hypothermic (2/11) and BCx, UA, RVP and BAL negative.   - Completed additional zosyn (2/12-2/19) empirically   - Fever spike and thick secretions and SCX (2/26) with ESBL klebs.   - s/p Zosyn (2/27 - 2/29) but resistant and completed Erta (2/29 - 3/6)     HEME  # AOCD   - Monitor HH   - DVT PPX with HSQ     ENDOCRINE   # DM2 A1C 9.3   - Continue on Lantus 14 and ISS     SKIN/ TUBES   - Trach (2/13)   - PEG (2/20)   - PERC LYNSEY (1/26 - )     ETHICS   - FULL CODE     DISPO - vent facility and family aware. SW with accepting facility, bed available today. stable for DC today.

## 2024-03-27 NOTE — PROGRESS NOTE ADULT - NUTRITIONAL ASSESSMENT
This patient has been assessed with a concern for Malnutrition and has been determined to have a diagnosis/diagnoses of Moderate protein-calorie malnutrition.    This patient is being managed with:   Diet Consistent Carbohydrate/No Snacks-  Pureed (PUREED)  Moderately Thick Liquids (MODTHICKLIQS)  Angelic  Entered: Dmitri 10 2024  6:33PM  
This patient has been assessed with a concern for Malnutrition and has been determined to have a diagnosis/diagnoses of Moderate protein-calorie malnutrition.    This patient is being managed with:   Diet NPO with Tube Feed-  Tube Feeding Modality: Nasogastric  Glucerna 1.5 Prince (GLUCERNA1.5RTH)  Total Volume for 24 Hours (mL): 1200  Continuous  Starting Tube Feed Rate {mL per Hour}: 25  Increase Tube Feed Rate by (mL): 10     Every 6 hours  Until Goal Tube Feed Rate (mL per Hour): 50  Tube Feed Duration (in Hours): 24  Tube Feed Start Time: 16:14  Entered: Jan 5 2024  4:14PM  
This patient has been assessed with a concern for Malnutrition and has been determined to have a diagnosis/diagnoses of Moderate protein-calorie malnutrition.    This patient is being managed with:   Diet NPO-  Entered: Jan 4 2024  8:31AM  
This patient has been assessed with a concern for Malnutrition and has been determined to have a diagnosis/diagnoses of Moderate protein-calorie malnutrition.    This patient is being managed with:   Diet NPO-  Except Medications  Entered: Jan 25 2024  3:19AM  
This patient has been assessed with a concern for Malnutrition and has been determined to have a diagnosis/diagnoses of Severe protein-calorie malnutrition.    This patient is being managed with:   Diet NPO with Tube Feed-  Tube Feeding Modality: Gastrostomy  Glucerna 1.5 Prince (GLUCERNA1.5RTH)  Total Volume for 24 Hours (mL): 1200  Continuous  Starting Tube Feed Rate {mL per Hour}: 50  Until Goal Tube Feed Rate (mL per Hour): 50  Tube Feed Duration (in Hours): 24  Tube Feed Start Time: 09:30  Annette TF     Qty per Day:  2  Entered: Feb 24 2024  1:44PM  
This patient has been assessed with a concern for Malnutrition and has been determined to have a diagnosis/diagnoses of Severe protein-calorie malnutrition.    This patient is being managed with:   Diet NPO with Tube Feed-  Tube Feeding Modality: Nasogastric  Glucerna 1.5 Prince (GLUCERNA1.5RTH)  Total Volume for 24 Hours (mL): 1080  Continuous  Starting Tube Feed Rate {mL per Hour}: 10  Increase Tube Feed Rate by (mL): 10     Every 6 hours  Until Goal Tube Feed Rate (mL per Hour): 45  Tube Feed Duration (in Hours): 24  Tube Feed Start Time: 12:00  Entered: Feb 15 2024 12:43PM  
This patient has been assessed with a concern for Malnutrition and has been determined to have a diagnosis/diagnoses of Severe protein-calorie malnutrition.    This patient is being managed with:   Diet NPO with Tube Feed-  Tube Feeding Modality: Nasogastric  Glucerna 1.5 Prince (GLUCERNA1.5RTH)  Total Volume for 24 Hours (mL): 1080  Continuous  Starting Tube Feed Rate {mL per Hour}: 10  Increase Tube Feed Rate by (mL): 10     Every 6 hours  Until Goal Tube Feed Rate (mL per Hour): 45  Tube Feed Duration (in Hours): 24  Tube Feed Start Time: 12:00  Entered: Feb 15 2024 12:43PM  
This patient has been assessed with a concern for Malnutrition and has been determined to have a diagnosis/diagnoses of Severe protein-calorie malnutrition.    This patient is being managed with:   Diet NPO with Tube Feed-  Tube Feeding Modality: Nasogastric  Glucerna 1.5 Prince (GLUCERNA1.5RTH)  Total Volume for 24 Hours (mL): 1080  Continuous  Starting Tube Feed Rate {mL per Hour}: 10  Increase Tube Feed Rate by (mL): 10     Every 6 hours  Until Goal Tube Feed Rate (mL per Hour): 45  Tube Feed Duration (in Hours): 24  Tube Feed Start Time: 12:00  Free Water Flush  Bolus   Total Volume per Flush (mL): 250   Frequency: Every 4 Hours  Entered: Feb 9 2024  7:56PM  
This patient has been assessed with a concern for Malnutrition and has been determined to have a diagnosis/diagnoses of Severe protein-calorie malnutrition.    This patient is being managed with:   Diet NPO with Tube Feed-  Tube Feeding Modality: Nasogastric  Glucerna 1.5 Prince (GLUCERNA1.5RTH)  Total Volume for 24 Hours (mL): 960  Continuous  Starting Tube Feed Rate {mL per Hour}: 10  Increase Tube Feed Rate by (mL): 10     Every 6 hours  Until Goal Tube Feed Rate (mL per Hour): 40  Tube Feed Duration (in Hours): 24  Tube Feed Start Time: 12:00  Entered: Jan 25 2024 11:44AM  
This patient has been assessed with a concern for Malnutrition and has been determined to have a diagnosis/diagnoses of Moderate protein-calorie malnutrition.    This patient is being managed with:   Diet Consistent Carbohydrate/No Snacks-  Pureed (PUREED)  Moderately Thick Liquids (MODTHICKLIQS)  Entered: Jan 8 2024  3:34PM  
This patient has been assessed with a concern for Malnutrition and has been determined to have a diagnosis/diagnoses of Moderate protein-calorie malnutrition.    This patient is being managed with:   Diet NPO-  Entered: Jan 19 2024  5:10PM  
This patient has been assessed with a concern for Malnutrition and has been determined to have a diagnosis/diagnoses of Severe protein-calorie malnutrition.    This patient is being managed with:   Diet NPO with Tube Feed-  Tube Feeding Modality: Gastrostomy  Glucerna 1.5 Prince (GLUCERNA1.5RTH)  Total Volume for 24 Hours (mL): 1200  Continuous  Starting Tube Feed Rate {mL per Hour}: 50  Until Goal Tube Feed Rate (mL per Hour): 50  Tube Feed Duration (in Hours): 24  Tube Feed Start Time: 09:30  Annette TF     Qty per Day:  2  Entered: Feb 24 2024  1:44PM  
This patient has been assessed with a concern for Malnutrition and has been determined to have a diagnosis/diagnoses of Severe protein-calorie malnutrition.    This patient is being managed with:   Diet NPO with Tube Feed-  Tube Feeding Modality: Gastrostomy  Glucerna 1.5 Prince (GLUCERNA1.5RTH)  Total Volume for 24 Hours (mL): 1200  Continuous  Starting Tube Feed Rate {mL per Hour}: 50  Until Goal Tube Feed Rate (mL per Hour): 50  Tube Feed Duration (in Hours): 24  Tube Feed Start Time: 09:30  Entered: Mar 21 2024  3:55PM  
This patient has been assessed with a concern for Malnutrition and has been determined to have a diagnosis/diagnoses of Severe protein-calorie malnutrition.    This patient is being managed with:   Diet NPO with Tube Feed-  Tube Feeding Modality: Nasogastric  Glucerna 1.5 Prince (GLUCERNA1.5RTH)  Total Volume for 24 Hours (mL): 1080  Continuous  Starting Tube Feed Rate {mL per Hour}: 10  Increase Tube Feed Rate by (mL): 10     Every 6 hours  Until Goal Tube Feed Rate (mL per Hour): 45  Tube Feed Duration (in Hours): 24  Tube Feed Start Time: 12:00  Entered: Feb 12 2024 11:37PM    Diet NPO after Midnight-     NPO Start Date: 12-Feb-2024   NPO Start Time: 23:59  Except Medications  Entered: Feb 12 2024  5:13PM  
This patient has been assessed with a concern for Malnutrition and has been determined to have a diagnosis/diagnoses of Severe protein-calorie malnutrition.    This patient is being managed with:   Diet NPO with Tube Feed-  Tube Feeding Modality: Nasogastric  Glucerna 1.5 Prince (GLUCERNA1.5RTH)  Total Volume for 24 Hours (mL): 1080  Continuous  Starting Tube Feed Rate {mL per Hour}: 10  Increase Tube Feed Rate by (mL): 10     Every 6 hours  Until Goal Tube Feed Rate (mL per Hour): 45  Tube Feed Duration (in Hours): 24  Tube Feed Start Time: 12:00  Entered: Feb 15 2024 12:43PM  
This patient has been assessed with a concern for Malnutrition and has been determined to have a diagnosis/diagnoses of Severe protein-calorie malnutrition.    This patient is being managed with:   Diet NPO with Tube Feed-  Tube Feeding Modality: Nasogastric  Glucerna 1.5 Prince (GLUCERNA1.5RTH)  Total Volume for 24 Hours (mL): 1080  Continuous  Starting Tube Feed Rate {mL per Hour}: 10  Increase Tube Feed Rate by (mL): 10     Every 6 hours  Until Goal Tube Feed Rate (mL per Hour): 45  Tube Feed Duration (in Hours): 24  Tube Feed Start Time: 12:00  Free Water Flush  Bolus   Total Volume per Flush (mL): 250   Frequency: Every 4 Hours  Entered: Feb 9 2024  7:56PM  
This patient has been assessed with a concern for Malnutrition and has been determined to have a diagnosis/diagnoses of Severe protein-calorie malnutrition.    This patient is being managed with:   Diet NPO with Tube Feed-  Tube Feeding Modality: Nasogastric  Glucerna 1.5 Prince (GLUCERNA1.5RTH)  Total Volume for 24 Hours (mL): 960  Continuous  Starting Tube Feed Rate {mL per Hour}: 10  Increase Tube Feed Rate by (mL): 10     Every 6 hours  Until Goal Tube Feed Rate (mL per Hour): 40  Tube Feed Duration (in Hours): 24  Tube Feed Start Time: 12:00  Entered: Jan 25 2024 11:44AM  
This patient has been assessed with a concern for Malnutrition and has been determined to have a diagnosis/diagnoses of Severe protein-calorie malnutrition.    This patient is being managed with:   Diet NPO with Tube Feed-  Tube Feeding Modality: Nasogastric  Glucerna 1.5 Prince (GLUCERNA1.5RTH)  Total Volume for 24 Hours (mL): 960  Continuous  Starting Tube Feed Rate {mL per Hour}: 10  Increase Tube Feed Rate by (mL): 10     Every 6 hours  Until Goal Tube Feed Rate (mL per Hour): 40  Tube Feed Duration (in Hours): 24  Tube Feed Start Time: 12:00  Entered: Jan 25 2024 11:44AM  
This patient has been assessed with a concern for Malnutrition and has been determined to have a diagnosis/diagnoses of Severe protein-calorie malnutrition.    This patient is being managed with:   Diet NPO with Tube Feed-  Tube Feeding Modality: Nasogastric  Glucerna 1.5 Prince (GLUCERNA1.5RTH)  Total Volume for 24 Hours (mL): 960  Continuous  Starting Tube Feed Rate {mL per Hour}: 10  Increase Tube Feed Rate by (mL): 10     Every 6 hours  Until Goal Tube Feed Rate (mL per Hour): 40  Tube Feed Duration (in Hours): 24  Tube Feed Start Time: 12:00  Free Water Flush  Bolus   Total Volume per Flush (mL): 250   Frequency: Every 4 Hours  Entered: Feb  3 2024 10:09AM  
This patient has been assessed with a concern for Malnutrition and has been determined to have a diagnosis/diagnoses of Severe protein-calorie malnutrition.    This patient is being managed with:   Diet NPO with Tube Feed-  Tube Feeding Modality: Nasogastric  Glucerna 1.5 Prince (GLUCERNA1.5RTH)  Total Volume for 24 Hours (mL): 960  Continuous  Starting Tube Feed Rate {mL per Hour}: 10  Increase Tube Feed Rate by (mL): 10     Every 6 hours  Until Goal Tube Feed Rate (mL per Hour): 40  Tube Feed Duration (in Hours): 24  Tube Feed Start Time: 12:00  Free Water Flush  Bolus   Total Volume per Flush (mL): 250   Frequency: Every 4 Hours  Entered: Feb 7 2024  1:10AM  
This patient has been assessed with a concern for Malnutrition and has been determined to have a diagnosis/diagnoses of Severe protein-calorie malnutrition.    This patient is being managed with:   Diet NPO-  Entered: Feb 6 2024  3:25AM    Diet NPO after Midnight-     NPO Start Date: 25-Jan-2024   NPO Start Time: 23:59  Entered: Feb 5 2024  8:00PM  
This patient has been assessed with a concern for Malnutrition and has been determined to have a diagnosis/diagnoses of Moderate protein-calorie malnutrition.    This patient is being managed with:   Diet Consistent Carbohydrate/No Snacks-  Pureed (PUREED)  Moderately Thick Liquids (MODTHICKLIQS)  Angelic  Entered: Dmitri 10 2024  6:33PM  
This patient has been assessed with a concern for Malnutrition and has been determined to have a diagnosis/diagnoses of Moderate protein-calorie malnutrition.    This patient is being managed with:   Diet Consistent Carbohydrate/No Snacks-  Pureed (PUREED)  Moderately Thick Liquids (MODTHICKLIQS)  Angelic  Entered: Dmitri 10 2024  6:33PM  
This patient has been assessed with a concern for Malnutrition and has been determined to have a diagnosis/diagnoses of Moderate protein-calorie malnutrition.    This patient is being managed with:   Diet Consistent Carbohydrate/No Snacks-  Pureed (PUREED)  Moderately Thick Liquids (MODTHICKLIQS)  Entered: Jan 8 2024  3:34PM  
This patient has been assessed with a concern for Malnutrition and has been determined to have a diagnosis/diagnoses of Moderate protein-calorie malnutrition.    This patient is being managed with:   Diet Consistent Carbohydrate/No Snacks-  Pureed (PUREED)  Moderately Thick Liquids (MODTHICKLIQS)  Halal  Supplement Feeding Modality:  Oral  Glucerna Shake Cans or Servings Per Day:  2       Frequency:  Daily  Entered: Jan 17 2024  3:31PM  
This patient has been assessed with a concern for Malnutrition and has been determined to have a diagnosis/diagnoses of Moderate protein-calorie malnutrition.    This patient is being managed with:   Diet NPO with Tube Feed-  Tube Feeding Modality: Nasogastric  Glucerna 1.5 Prince (GLUCERNA1.5RTH)  Total Volume for 24 Hours (mL): 1200  Continuous  Starting Tube Feed Rate {mL per Hour}: 25  Increase Tube Feed Rate by (mL): 10     Every 6 hours  Until Goal Tube Feed Rate (mL per Hour): 50  Tube Feed Duration (in Hours): 24  Tube Feed Start Time: 16:14  Entered: Jan 5 2024  4:14PM  
This patient has been assessed with a concern for Malnutrition and has been determined to have a diagnosis/diagnoses of Moderate protein-calorie malnutrition.    This patient is being managed with:   Diet NPO with Tube Feed-  Tube Feeding Modality: Nasogastric  Glucerna 1.5 Prince (GLUCERNA1.5RTH)  Total Volume for 24 Hours (mL): 1200  Continuous  Starting Tube Feed Rate {mL per Hour}: 25  Increase Tube Feed Rate by (mL): 10     Every 6 hours  Until Goal Tube Feed Rate (mL per Hour): 50  Tube Feed Duration (in Hours): 24  Tube Feed Start Time: 16:14  Entered: Jan 5 2024  4:14PM  
This patient has been assessed with a concern for Malnutrition and has been determined to have a diagnosis/diagnoses of Moderate protein-calorie malnutrition.    This patient is being managed with:   Diet Soft and Bite Sized-  Consistent Carbohydrate {No Snacks} (CSTCHO)  Halal  Supplement Feeding Modality:  Oral  Glucerna Shake Cans or Servings Per Day:  2       Frequency:  Daily  Entered: Jan 18 2024  1:13PM  
This patient has been assessed with a concern for Malnutrition and has been determined to have a diagnosis/diagnoses of Severe protein-calorie malnutrition.    This patient is being managed with:   Diet NPO after Midnight-     NPO Start Date: 11-Feb-2024   NPO Start Time: 23:59  Except Medications  Entered: Feb 11 2024  9:19AM    Diet NPO with Tube Feed-  Tube Feeding Modality: Nasogastric  Glucerna 1.5 Prince (GLUCERNA1.5RTH)  Total Volume for 24 Hours (mL): 1080  Continuous  Starting Tube Feed Rate {mL per Hour}: 10  Increase Tube Feed Rate by (mL): 10     Every 6 hours  Until Goal Tube Feed Rate (mL per Hour): 45  Tube Feed Duration (in Hours): 24  Tube Feed Start Time: 12:00  Free Water Flush  Bolus   Total Volume per Flush (mL): 250   Frequency: Every 4 Hours  Entered: Feb 9 2024  7:56PM  
This patient has been assessed with a concern for Malnutrition and has been determined to have a diagnosis/diagnoses of Severe protein-calorie malnutrition.    This patient is being managed with:   Diet NPO with Tube Feed-  Tube Feeding Modality: Gastrostomy  Glucerna 1.5 Prince (GLUCERNA1.5RTH)  Total Volume for 24 Hours (mL): 1200  Continuous  Starting Tube Feed Rate {mL per Hour}: 50  Until Goal Tube Feed Rate (mL per Hour): 50  Tube Feed Duration (in Hours): 24  Tube Feed Start Time: 09:30  Annette TF     Qty per Day:  2  Entered: Feb 24 2024  1:44PM  
This patient has been assessed with a concern for Malnutrition and has been determined to have a diagnosis/diagnoses of Severe protein-calorie malnutrition.    This patient is being managed with:   Diet NPO with Tube Feed-  Tube Feeding Modality: Gastrostomy  Glucerna 1.5 Prince (GLUCERNA1.5RTH)  Total Volume for 24 Hours (mL): 1200  Continuous  Starting Tube Feed Rate {mL per Hour}: 50  Until Goal Tube Feed Rate (mL per Hour): 50  Tube Feed Duration (in Hours): 24  Tube Feed Start Time: 09:30  Entered: Mar 21 2024  3:55PM  
This patient has been assessed with a concern for Malnutrition and has been determined to have a diagnosis/diagnoses of Severe protein-calorie malnutrition.    This patient is being managed with:   Diet NPO with Tube Feed-  Tube Feeding Modality: Gastrostomy  Glucerna 1.5 Prince (GLUCERNA1.5RTH)  Total Volume for 24 Hours (mL): 1200  Continuous  Starting Tube Feed Rate {mL per Hour}: 50  Until Goal Tube Feed Rate (mL per Hour): 50  Tube Feed Duration (in Hours): 24  Tube Feed Start Time: 09:30  Entered: Mar 21 2024  3:55PM  
This patient has been assessed with a concern for Malnutrition and has been determined to have a diagnosis/diagnoses of Severe protein-calorie malnutrition.    This patient is being managed with:   Diet NPO with Tube Feed-  Tube Feeding Modality: Nasogastric  Glucerna 1.5 Prince (GLUCERNA1.5RTH)  Total Volume for 24 Hours (mL): 960  Continuous  Starting Tube Feed Rate {mL per Hour}: 10  Increase Tube Feed Rate by (mL): 10     Every 6 hours  Until Goal Tube Feed Rate (mL per Hour): 40  Tube Feed Duration (in Hours): 24  Tube Feed Start Time: 12:00  Free Water Flush  Bolus   Total Volume per Flush (mL): 250   Frequency: Every 4 Hours  Entered: Feb  3 2024 10:09AM  
Renal attending  a-w assessment and plan as above.      [facility]  Office: [phone]
This patient has been assessed with a concern for Malnutrition and has been determined to have a diagnosis/diagnoses of Moderate protein-calorie malnutrition.    This patient is being managed with:   Diet NPO with Tube Feed-  Tube Feeding Modality: Nasogastric  Glucerna 1.5 Prince (GLUCERNA1.5RTH)  Total Volume for 24 Hours (mL): 1200  Continuous  Starting Tube Feed Rate {mL per Hour}: 25  Increase Tube Feed Rate by (mL): 10     Every 6 hours  Until Goal Tube Feed Rate (mL per Hour): 50  Tube Feed Duration (in Hours): 24  Tube Feed Start Time: 16:14  Entered: Jan 5 2024  4:14PM  
This patient has been assessed with a concern for Malnutrition and has been determined to have a diagnosis/diagnoses of Moderate protein-calorie malnutrition.    This patient is being managed with:   Diet NPO-  Entered: Jan 19 2024  5:10PM  
This patient has been assessed with a concern for Malnutrition and has been determined to have a diagnosis/diagnoses of Moderate protein-calorie malnutrition.    This patient is being managed with:   Diet NPO-  Entered: Jan 19 2024  5:10PM  
This patient has been assessed with a concern for Malnutrition and has been determined to have a diagnosis/diagnoses of Severe protein-calorie malnutrition.    This patient is being managed with:   Diet NPO after Midnight-     NPO Start Date: 11-Feb-2024   NPO Start Time: 23:59  Except Medications  Entered: Feb 11 2024  9:19AM    Diet NPO with Tube Feed-  Tube Feeding Modality: Nasogastric  Glucerna 1.5 Prince (GLUCERNA1.5RTH)  Total Volume for 24 Hours (mL): 1080  Continuous  Starting Tube Feed Rate {mL per Hour}: 10  Increase Tube Feed Rate by (mL): 10     Every 6 hours  Until Goal Tube Feed Rate (mL per Hour): 45  Tube Feed Duration (in Hours): 24  Tube Feed Start Time: 12:00  Free Water Flush  Bolus   Total Volume per Flush (mL): 250   Frequency: Every 4 Hours  Entered: Feb 9 2024  7:56PM  
This patient has been assessed with a concern for Malnutrition and has been determined to have a diagnosis/diagnoses of Severe protein-calorie malnutrition.    This patient is being managed with:   Diet NPO after Midnight-     NPO Start Date: 19-Feb-2024   NPO Start Time: 23:59  Entered: Feb 19 2024  3:10PM    Diet NPO with Tube Feed-  Tube Feeding Modality: Nasogastric  Glucerna 1.5 Prince (GLUCERNA1.5RTH)  Total Volume for 24 Hours (mL): 1080  Continuous  Starting Tube Feed Rate {mL per Hour}: 10  Increase Tube Feed Rate by (mL): 10     Every 6 hours  Until Goal Tube Feed Rate (mL per Hour): 45  Tube Feed Duration (in Hours): 24  Tube Feed Start Time: 12:00  Entered: Feb 15 2024 12:43PM  
This patient has been assessed with a concern for Malnutrition and has been determined to have a diagnosis/diagnoses of Severe protein-calorie malnutrition.    This patient is being managed with:   Diet NPO with Tube Feed-  Tube Feeding Modality: Gastrostomy  Glucerna 1.5 Prince (GLUCERNA1.5RTH)  Total Volume for 24 Hours (mL): 1080  Continuous  Starting Tube Feed Rate {mL per Hour}: 10  Increase Tube Feed Rate by (mL): 10     Every 6 hours  Until Goal Tube Feed Rate (mL per Hour): 45  Tube Feed Duration (in Hours): 24  Tube Feed Start Time: 12:00  Entered: Feb 21 2024  3:45PM  
This patient has been assessed with a concern for Malnutrition and has been determined to have a diagnosis/diagnoses of Severe protein-calorie malnutrition.    This patient is being managed with:   Diet NPO with Tube Feed-  Tube Feeding Modality: Gastrostomy  Glucerna 1.5 Prince (GLUCERNA1.5RTH)  Total Volume for 24 Hours (mL): 1200  Continuous  Starting Tube Feed Rate {mL per Hour}: 50  Until Goal Tube Feed Rate (mL per Hour): 50  Tube Feed Duration (in Hours): 24  Tube Feed Start Time: 09:30  Annette TF     Qty per Day:  2  Entered: Feb 24 2024  1:44PM  
This patient has been assessed with a concern for Malnutrition and has been determined to have a diagnosis/diagnoses of Severe protein-calorie malnutrition.    This patient is being managed with:   Diet NPO with Tube Feed-  Tube Feeding Modality: Gastrostomy  Glucerna 1.5 Prince (GLUCERNA1.5RTH)  Total Volume for 24 Hours (mL): 1200  Continuous  Starting Tube Feed Rate {mL per Hour}: 50  Until Goal Tube Feed Rate (mL per Hour): 50  Tube Feed Duration (in Hours): 24  Tube Feed Start Time: 09:30  Entered: Feb 23 2024  9:17AM  
This patient has been assessed with a concern for Malnutrition and has been determined to have a diagnosis/diagnoses of Severe protein-calorie malnutrition.    This patient is being managed with:   Diet NPO with Tube Feed-  Tube Feeding Modality: Nasogastric  Glucerna 1.5 Prince (GLUCERNA1.5RTH)  Total Volume for 24 Hours (mL): 1080  Continuous  Starting Tube Feed Rate {mL per Hour}: 10  Increase Tube Feed Rate by (mL): 10     Every 6 hours  Until Goal Tube Feed Rate (mL per Hour): 45  Tube Feed Duration (in Hours): 24  Tube Feed Start Time: 12:00  Entered: Feb 15 2024 12:43PM  
This patient has been assessed with a concern for Malnutrition and has been determined to have a diagnosis/diagnoses of Severe protein-calorie malnutrition.    This patient is being managed with:   Diet NPO with Tube Feed-  Tube Feeding Modality: Nasogastric  Glucerna 1.5 Prince (GLUCERNA1.5RTH)  Total Volume for 24 Hours (mL): 960  Continuous  Starting Tube Feed Rate {mL per Hour}: 10  Increase Tube Feed Rate by (mL): 10     Every 6 hours  Until Goal Tube Feed Rate (mL per Hour): 40  Tube Feed Duration (in Hours): 24  Tube Feed Start Time: 12:00  Entered: Jan 25 2024 11:44AM  
This patient has been assessed with a concern for Malnutrition and has been determined to have a diagnosis/diagnoses of Severe protein-calorie malnutrition.    This patient is being managed with:   Diet NPO with Tube Feed-  Tube Feeding Modality: Nasogastric  Glucerna 1.5 Prince (GLUCERNA1.5RTH)  Total Volume for 24 Hours (mL): 960  Continuous  Starting Tube Feed Rate {mL per Hour}: 10  Increase Tube Feed Rate by (mL): 10     Every 6 hours  Until Goal Tube Feed Rate (mL per Hour): 40  Tube Feed Duration (in Hours): 24  Tube Feed Start Time: 12:00  Free Water Flush  Bolus   Total Volume per Flush (mL): 250   Frequency: Every 4 Hours  Entered: Feb 7 2024  1:10AM  
This patient has been assessed with a concern for Malnutrition and has been determined to have a diagnosis/diagnoses of Moderate protein-calorie malnutrition.    This patient is being managed with:   Diet Consistent Carbohydrate/No Snacks-  Pureed (PUREED)  Moderately Thick Liquids (MODTHICKLIQS)  Angelic  Entered: Dmitri 10 2024  6:33PM  
This patient has been assessed with a concern for Malnutrition and has been determined to have a diagnosis/diagnoses of Moderate protein-calorie malnutrition.    This patient is being managed with:   Diet Consistent Carbohydrate/No Snacks-  Pureed (PUREED)  Moderately Thick Liquids (MODTHICKLIQS)  Entered: Jan 8 2024  3:34PM  
This patient has been assessed with a concern for Malnutrition and has been determined to have a diagnosis/diagnoses of Moderate protein-calorie malnutrition.    This patient is being managed with:   Diet Consistent Carbohydrate/No Snacks-  Pureed (PUREED)  Moderately Thick Liquids (MODTHICKLIQS)  Halal  Supplement Feeding Modality:  Oral  Glucerna Shake Cans or Servings Per Day:  2       Frequency:  Daily  Entered: Jan 17 2024  3:31PM  
This patient has been assessed with a concern for Malnutrition and has been determined to have a diagnosis/diagnoses of Moderate protein-calorie malnutrition.    This patient is being managed with:   Diet NPO after Midnight-     NPO Start Date: 25-Jan-2024   NPO Start Time: 23:59  Entered: Jan 25 2024 11:53PM    Diet NPO with Tube Feed-  Tube Feeding Modality: Nasogastric  Glucerna 1.5 Prince (GLUCERNA1.5RTH)  Total Volume for 24 Hours (mL): 960  Continuous  Starting Tube Feed Rate {mL per Hour}: 10  Increase Tube Feed Rate by (mL): 10     Every 6 hours  Until Goal Tube Feed Rate (mL per Hour): 40  Tube Feed Duration (in Hours): 24  Tube Feed Start Time: 12:00  Entered: Jan 25 2024 11:44AM  
This patient has been assessed with a concern for Malnutrition and has been determined to have a diagnosis/diagnoses of Moderate protein-calorie malnutrition.    This patient is being managed with:   Diet NPO after Midnight-     NPO Start Date: 25-Jan-2024   NPO Start Time: 23:59  Entered: Jan 25 2024 11:53PM    Diet NPO with Tube Feed-  Tube Feeding Modality: Nasogastric  Glucerna 1.5 Prince (GLUCERNA1.5RTH)  Total Volume for 24 Hours (mL): 960  Continuous  Starting Tube Feed Rate {mL per Hour}: 10  Increase Tube Feed Rate by (mL): 10     Every 6 hours  Until Goal Tube Feed Rate (mL per Hour): 40  Tube Feed Duration (in Hours): 24  Tube Feed Start Time: 12:00  Entered: Jan 25 2024 11:44AM  
This patient has been assessed with a concern for Malnutrition and has been determined to have a diagnosis/diagnoses of Moderate protein-calorie malnutrition.    This patient is being managed with:   Diet NPO with Tube Feed-  Tube Feeding Modality: Nasogastric  Glucerna 1.5 Prince (GLUCERNA1.5RTH)  Total Volume for 24 Hours (mL): 1200  Continuous  Starting Tube Feed Rate {mL per Hour}: 25  Increase Tube Feed Rate by (mL): 10     Every 6 hours  Until Goal Tube Feed Rate (mL per Hour): 50  Tube Feed Duration (in Hours): 24  Tube Feed Start Time: 16:14  Entered: Jan 5 2024  4:14PM  
This patient has been assessed with a concern for Malnutrition and has been determined to have a diagnosis/diagnoses of Moderate protein-calorie malnutrition.    This patient is being managed with:   Diet NPO-  Entered: Jan 19 2024  5:10PM  
This patient has been assessed with a concern for Malnutrition and has been determined to have a diagnosis/diagnoses of Moderate protein-calorie malnutrition.    This patient is being managed with:   Diet NPO-  Entered: Jan 19 2024  5:10PM  
This patient has been assessed with a concern for Malnutrition and has been determined to have a diagnosis/diagnoses of Severe protein-calorie malnutrition.    This patient is being managed with:   Diet NPO after Midnight-     NPO Start Date: 14-Feb-2024   NPO Start Time: 23:59  Except Medications  Entered: Feb 14 2024  1:54PM    Diet NPO with Tube Feed-  Tube Feeding Modality: Nasogastric  Glucerna 1.5 Prince (GLUCERNA1.5RTH)  Total Volume for 24 Hours (mL): 1080  Continuous  Starting Tube Feed Rate {mL per Hour}: 10  Increase Tube Feed Rate by (mL): 10     Every 6 hours  Until Goal Tube Feed Rate (mL per Hour): 45  Tube Feed Duration (in Hours): 24  Tube Feed Start Time: 12:00  Entered: Feb 14 2024  6:24AM  
This patient has been assessed with a concern for Malnutrition and has been determined to have a diagnosis/diagnoses of Severe protein-calorie malnutrition.    This patient is being managed with:   Diet NPO after Midnight-     NPO Start Date: 19-Feb-2024   NPO Start Time: 23:59  Entered: Feb 19 2024  3:10PM    Diet NPO with Tube Feed-  Tube Feeding Modality: Nasogastric  Glucerna 1.5 Prince (GLUCERNA1.5RTH)  Total Volume for 24 Hours (mL): 1080  Continuous  Starting Tube Feed Rate {mL per Hour}: 10  Increase Tube Feed Rate by (mL): 10     Every 6 hours  Until Goal Tube Feed Rate (mL per Hour): 45  Tube Feed Duration (in Hours): 24  Tube Feed Start Time: 12:00  Entered: Feb 15 2024 12:43PM  
This patient has been assessed with a concern for Malnutrition and has been determined to have a diagnosis/diagnoses of Severe protein-calorie malnutrition.    This patient is being managed with:   Diet NPO with Tube Feed-  Tube Feeding Modality: Gastrostomy  Glucerna 1.5 Prince (GLUCERNA1.5RTH)  Total Volume for 24 Hours (mL): 1080  Continuous  Starting Tube Feed Rate {mL per Hour}: 10  Increase Tube Feed Rate by (mL): 10     Every 6 hours  Until Goal Tube Feed Rate (mL per Hour): 45  Tube Feed Duration (in Hours): 24  Tube Feed Start Time: 12:00  Entered: Feb 21 2024  3:45PM  
This patient has been assessed with a concern for Malnutrition and has been determined to have a diagnosis/diagnoses of Severe protein-calorie malnutrition.    This patient is being managed with:   Diet NPO with Tube Feed-  Tube Feeding Modality: Gastrostomy  Glucerna 1.5 Prince (GLUCERNA1.5RTH)  Total Volume for 24 Hours (mL): 1200  Continuous  Starting Tube Feed Rate {mL per Hour}: 50  Until Goal Tube Feed Rate (mL per Hour): 50  Tube Feed Duration (in Hours): 24  Tube Feed Start Time: 09:30  Annette TF     Qty per Day:  2  Entered: Feb 24 2024  1:44PM  
This patient has been assessed with a concern for Malnutrition and has been determined to have a diagnosis/diagnoses of Severe protein-calorie malnutrition.    This patient is being managed with:   Diet NPO with Tube Feed-  Tube Feeding Modality: Gastrostomy  Glucerna 1.5 Prince (GLUCERNA1.5RTH)  Total Volume for 24 Hours (mL): 1200  Continuous  Starting Tube Feed Rate {mL per Hour}: 50  Until Goal Tube Feed Rate (mL per Hour): 50  Tube Feed Duration (in Hours): 24  Tube Feed Start Time: 09:30  Entered: Mar 21 2024  3:55PM  
This patient has been assessed with a concern for Malnutrition and has been determined to have a diagnosis/diagnoses of Severe protein-calorie malnutrition.    This patient is being managed with:   Diet NPO with Tube Feed-  Tube Feeding Modality: Nasogastric  Glucerna 1.5 Prince (GLUCERNA1.5RTH)  Total Volume for 24 Hours (mL): 960  Continuous  Starting Tube Feed Rate {mL per Hour}: 10  Increase Tube Feed Rate by (mL): 10     Every 6 hours  Until Goal Tube Feed Rate (mL per Hour): 40  Tube Feed Duration (in Hours): 24  Tube Feed Start Time: 12:00  Entered: Jan 25 2024 11:44AM  
This patient has been assessed with a concern for Malnutrition and has been determined to have a diagnosis/diagnoses of Severe protein-calorie malnutrition.    This patient is being managed with:   Diet NPO with Tube Feed-  Tube Feeding Modality: Nasogastric  Glucerna 1.5 Prince (GLUCERNA1.5RTH)  Total Volume for 24 Hours (mL): 960  Continuous  Starting Tube Feed Rate {mL per Hour}: 10  Increase Tube Feed Rate by (mL): 10     Every 6 hours  Until Goal Tube Feed Rate (mL per Hour): 40  Tube Feed Duration (in Hours): 24  Tube Feed Start Time: 12:00  Entered: Jan 25 2024 11:44AM  
This patient has been assessed with a concern for Malnutrition and has been determined to have a diagnosis/diagnoses of Severe protein-calorie malnutrition.    This patient is being managed with:   Diet NPO with Tube Feed-  Tube Feeding Modality: Nasogastric  Glucerna 1.5 Prince (GLUCERNA1.5RTH)  Total Volume for 24 Hours (mL): 960  Continuous  Starting Tube Feed Rate {mL per Hour}: 10  Increase Tube Feed Rate by (mL): 10     Every 6 hours  Until Goal Tube Feed Rate (mL per Hour): 40  Tube Feed Duration (in Hours): 24  Tube Feed Start Time: 12:00  Free Water Flush  Bolus   Total Volume per Flush (mL): 250   Frequency: Every 4 Hours  Entered: Feb 7 2024  1:10AM  
This patient has been assessed with a concern for Malnutrition and has been determined to have a diagnosis/diagnoses of Severe protein-calorie malnutrition.    This patient is being managed with:   Diet NPO-  Entered: Feb 14 2024  6:20AM  
Renal Attending   Physical Exam:  Lungs: vented diminished at bases  Heart: Normal S1,S2  Abdomen: soft ,non tender  Extremities: no edema      a-w assessment and plan as written above.    [facility]  Office: [phone]
Renal Attending     Patient seen and examined .chart and NP note reviewed .I agreed with plan ans assessment and plan of care as written    lasix 40 mg iv once PRN       Elsa loli  [facility]  Office: [phone]
This patient has been assessed with a concern for Malnutrition and has been determined to have a diagnosis/diagnoses of Moderate protein-calorie malnutrition.    This patient is being managed with:   Diet Clear Liquid-  Consistent Carbohydrate {Evening Snack} (CSTCHOSN)  Angelic  Entered: Dmitri  3 2024  1:47PM  
This patient has been assessed with a concern for Malnutrition and has been determined to have a diagnosis/diagnoses of Moderate protein-calorie malnutrition.    This patient is being managed with:   Diet Consistent Carbohydrate/No Snacks-  Pureed (PUREED)  Moderately Thick Liquids (MODTHICKLIQS)  Entered: Jan 8 2024  3:34PM  
agreed with assessment and plan as above .     [facility]  Office: [phone]
This patient has been assessed with a concern for Malnutrition and has been determined to have a diagnosis/diagnoses of Severe protein-calorie malnutrition.    This patient is being managed with:   Diet NPO with Tube Feed-  Tube Feeding Modality: Gastrostomy  Glucerna 1.5 Prince (GLUCERNA1.5RTH)  Total Volume for 24 Hours (mL): 1200  Continuous  Starting Tube Feed Rate {mL per Hour}: 50  Until Goal Tube Feed Rate (mL per Hour): 50  Tube Feed Duration (in Hours): 24  Tube Feed Start Time: 09:30  Annette TF     Qty per Day:  2  Entered: Feb 24 2024  1:44PM  
This patient has been assessed with a concern for Malnutrition and has been determined to have a diagnosis/diagnoses of Severe protein-calorie malnutrition.    This patient is being managed with:   Diet NPO with Tube Feed-  Tube Feeding Modality: Gastrostomy  Glucerna 1.5 Prince (GLUCERNA1.5RTH)  Total Volume for 24 Hours (mL): 1200  Continuous  Starting Tube Feed Rate {mL per Hour}: 50  Until Goal Tube Feed Rate (mL per Hour): 50  Tube Feed Duration (in Hours): 24  Tube Feed Start Time: 09:30  Annette TF     Qty per Day:  2  Entered: Feb 24 2024  1:44PM

## 2024-03-27 NOTE — PROGRESS NOTE ADULT - SUBJECTIVE AND OBJECTIVE BOX
Aashish Boyer MD  Interventional Cardiology / Endovascular Specialist  [place] Office : [phone] [place] [id number] Tel:   [place] Office : [address] [facility] [address] [address]  Tel: [phone]      Subjective/Overnight events: Patient lying in bed. No acute distress.  	  MEDICATIONS:  aspirin  chewable 81 milliGRAM(s) Enteral Tube daily  carvedilol 6.25 milliGRAM(s) Oral every 12 hours  doxazosin 2 milliGRAM(s) Oral at bedtime  furosemide    Tablet 20 milliGRAM(s) Oral daily  heparin   Injectable 5000 Unit(s) SubCutaneous every 8 hours  ticagrelor 60 milliGRAM(s) Oral every 12 hours      albuterol/ipratropium for Nebulization 3 milliLiter(s) Nebulizer every 12 hours    acetaminophen   Oral Liquid .. 650 milliGRAM(s) Oral every 6 hours PRN  escitalopram Solution 10 milliGRAM(s) Oral daily  levETIRAcetam  Solution 125 milliGRAM(s) Oral two times a day  LORazepam     Tablet 0.5 milliGRAM(s) Oral at bedtime  LORazepam     Tablet 0.25 milliGRAM(s) Oral every 6 hours PRN  melatonin 6 milliGRAM(s) Oral at bedtime  QUEtiapine 75 milliGRAM(s) Oral at bedtime  QUEtiapine 50 milliGRAM(s) Oral <User Schedule>  QUEtiapine 12.5 milliGRAM(s) Oral every 6 hours PRN  valproic  acid Syrup 125 milliGRAM(s) Oral every 8 hours    pantoprazole   Suspension 40 milliGRAM(s) Oral every 12 hours  polyethylene glycol 3350 17 Gram(s) Oral daily  senna Syrup 10 milliLiter(s) Oral at bedtime  sucralfate suspension 1 Gram(s) Enteral Tube two times a day    dextrose 50% Injectable 25 Gram(s) IV Push once  dextrose 50% Injectable 25 Gram(s) IV Push once  dextrose 50% Injectable 12.5 Gram(s) IV Push once  dextrose Oral Gel 15 Gram(s) Oral once PRN  glucagon  Injectable 1 milliGRAM(s) IntraMuscular once  insulin glargine Injectable (LANTUS) 14 Unit(s) SubCutaneous <User Schedule>  insulin lispro (ADMELOG) corrective regimen sliding scale   SubCutaneous every 6 hours    artificial  tears Solution 1 Drop(s) Left EYE four times a day  chlorhexidine 0.12% Liquid 15 milliLiter(s) Oral Mucosa every 12 hours  chlorhexidine 2% Cloths 1 Application(s) Topical daily  darbepoetin Injectable ViaL 60 MICROGram(s) SubCutaneous once  dextrose 5%. 1000 milliLiter(s) IV Continuous <Continuous>  dextrose 5%. 1000 milliLiter(s) IV Continuous <Continuous>  petrolatum Ophthalmic Ointment 1 Application(s) Left EYE at bedtime      PAST MEDICAL/SURGICAL HISTORY  PAST MEDICAL & SURGICAL HISTORY:  Hyperlipemia      Hypertension      Coronary Artery Disease      Diabetes Mellitus Type II      Stented Coronary Artery  total 5 stents, last stent 5/2019      Neuropathy      Myocardial infarction      Stroke  mild left facial numbness   no other residuals verbalized      Myoclonic jerking      Stage 3 chronic kidney disease      History of Cataract Extraction      Hx of CABG      H/O coronary angiogram      S/P coronary artery stent placement  1/6/09      S/P placement of cardiac pacemaker          SOCIAL HISTORY: Substance Use (street drugs): ( x ) never used  (  ) other:    FAMILY HISTORY:  No pertinent family history in first degree relatives          PHYSICAL EXAM:  T(C): 36.3 (03-27-24 @ 08:00), Max: 36.4 (03-27-24 @ 04:00)  HR: 77 (03-27-24 @ 10:28) (76 - 86)  BP: 142/55 (03-27-24 @ 08:00) (133/61 - 142/55)  RR: 4 (03-27-24 @ 08:00) (4 - 18)  SpO2: 100% (03-27-24 @ 10:28) (100% - 100%)  Wt(kg): --  I&O's Summary    26 Mar 2024 07:01  -  27 Mar 2024 07:00  --------------------------------------------------------  IN: 1200 mL / OUT: 1005 mL / NET: 195 mL            GENERAL: s/p trach  EYES: conjunctiva and sclera clear  ENMT:   Moist mucous membranes, Good dentition, No lesions  Cardiovascular: Normal S1 S2, No JVD, No murmurs, No edema  Respiratory: Lungs clear to auscultation	  Gastrointestinal:  s/p PEG   Extremities: no edema                                        9.0    6.48  )-----------( 433      ( 27 Mar 2024 10:45 )             28.3     03-27    140  |  100  |  46<H>  ----------------------------<  136<H>  4.0   |  29  |  1.39<H>    Ca    9.0      27 Mar 2024 10:45  Phos  3.5     03-27  Mg     2.30     03-27    TPro  6.8  /  Alb  2.9<L>  /  TBili  0.2  /  DBili  x   /  AST  19  /  ALT  11  /  AlkPhos  102  03-27    proBNP:   Lipid Profile:   HgA1c:   TSH:     Consultant(s) Notes Reviewed:  [x ] YES  [ ] NO    Care Discussed with Consultants/Other Providers [ x] YES  [ ] NO    Imaging Personally Reviewed independently:  [x] YES  [ ] NO    All labs, radiologic studies, vitals, orders and medications list reviewed. Patient is seen and examined at bedside. Case discussed with medical team.

## 2024-03-27 NOTE — DISCHARGE NOTE NURSING/CASE MANAGEMENT/SOCIAL WORK - NSDCPETBCESMAN_GEN_ALL_CORE
Detail Level: Detailed
Depth Of Biopsy: dermis
Was A Bandage Applied: Yes
Size Of Lesion In Cm: 0
Biopsy Type: H and E
Biopsy Method: Personna blade
Anesthesia Type: 1% lidocaine without epinephrine
Anesthesia Volume In Cc: 0.5
Hemostasis: Aluminum Chloride
Wound Care: Vaseline
Dressing: bandage
Destruction After The Procedure: No
Type Of Destruction Used: Curettage
Curettage Text: The wound bed was treated with curettage after the biopsy was performed.
Cryotherapy Text: The wound bed was treated with cryotherapy after the biopsy was performed.
Electrodesiccation Text: The wound bed was treated with electrodesiccation after the biopsy was performed.
If you are a smoker, it is important for your health to stop smoking. Please be aware that second hand smoke is also harmful.
Electrodesiccation And Curettage Text: The wound bed was treated with electrodesiccation and curettage after the biopsy was performed.
Silver Nitrate Text: The wound bed was treated with silver nitrate after the biopsy was performed.
Lab: [id number]
Lab Facility: [id number]
Consent: Written consent was obtained and risks were reviewed including but not limited to scarring, infection, bleeding, scabbing, incomplete removal, nerve damage and allergy to anesthesia.
Post-Care Instructions: I reviewed with the patient in detail post-care instructions. Patient is to keep the biopsy site dry overnight, and then apply Vaseline twice daily until healed. Patient may apply hydrogen peroxide soaks to remove any crusting.
Notification Instructions: Patient will be notified of biopsy results. However, patient instructed to call the office if not contacted within 2 weeks. Results will be faxed to PCP once they are available.
Billing Type: Third-Party Bill
Information: Selecting Yes will display possible errors in your note based on the variables you have selected. This validation is only offered as a suggestion for you. PLEASE NOTE THAT THE VALIDATION TEXT WILL BE REMOVED WHEN YOU FINALIZE YOUR NOTE. IF YOU WANT TO FAX A PRELIMINARY NOTE YOU WILL NEED TO TOGGLE THIS TO 'NO' IF YOU DO NOT WANT IT IN YOUR FAXED NOTE.

## 2024-03-27 NOTE — PROGRESS NOTE ADULT - SUBJECTIVE AND OBJECTIVE BOX
CHIEF COMPLAINT:Patient is a 89y old  Male who presents with a chief complaint of COVID19, Chest pain (26 Mar 2024 15:11)      INTERVAL EVENTS:     ROS: Seen by bedside during AM rounds     OBJECTIVE:  ICU Vital Signs Last 24 Hrs  T(C): 36.4 (27 Mar 2024 04:00), Max: 36.5 (26 Mar 2024 08:00)  T(F): 97.5 (27 Mar 2024 04:00), Max: 97.7 (26 Mar 2024 08:00)  HR: 76 (27 Mar 2024 06:09) (76 - 86)  BP: 135/49 (27 Mar 2024 04:00) (129/51 - 140/55)  BP(mean): --  ABP: --  ABP(mean): --  RR: 18 (27 Mar 2024 04:00) (16 - 18)  SpO2: 100% (27 Mar 2024 06:09) (100% - 100%)    O2 Parameters below as of 27 Mar 2024 04:00  Patient On (Oxygen Delivery Method): ventilator    O2 Concentration (%): 30      Mode: AC/ CMV (Assist Control/ Continuous Mandatory Ventilation), RR (machine): 16, TV (machine): 400, FiO2: 30, PEEP: 5, ITime: 0.83, MAP: 13, PIP: 40    03-26 @ 07:01  -  03-27 @ 07:00  --------------------------------------------------------  IN: 1200 mL / OUT: 1005 mL / NET: 195 mL      CAPILLARY BLOOD GLUCOSE      POCT Blood Glucose.: 136 mg/dL (27 Mar 2024 04:52)      PHYSICAL EXAM:  General:   HEENT:   Lymph Nodes:  Neck:   Respiratory:   Cardiovascular:   Abdomen:   Extremities:   Skin:   Neurological:  Psychiatry:    Mode: AC/ CMV (Assist Control/ Continuous Mandatory Ventilation)  RR (machine): 16  TV (machine): 400  FiO2: 30  PEEP: 5  ITime: 0.83  MAP: 13  PIP: 40      HOSPITAL MEDICATIONS:  MEDICATIONS  (STANDING):  albuterol/ipratropium for Nebulization 3 milliLiter(s) Nebulizer every 12 hours  artificial  tears Solution 1 Drop(s) Left EYE four times a day  aspirin  chewable 81 milliGRAM(s) Enteral Tube daily  carvedilol 6.25 milliGRAM(s) Oral every 12 hours  chlorhexidine 0.12% Liquid 15 milliLiter(s) Oral Mucosa every 12 hours  chlorhexidine 2% Cloths 1 Application(s) Topical daily  darbepoetin Injectable ViaL 60 MICROGram(s) SubCutaneous once  dextrose 5%. 1000 milliLiter(s) (100 mL/Hr) IV Continuous <Continuous>  dextrose 5%. 1000 milliLiter(s) (50 mL/Hr) IV Continuous <Continuous>  dextrose 50% Injectable 25 Gram(s) IV Push once  dextrose 50% Injectable 12.5 Gram(s) IV Push once  dextrose 50% Injectable 25 Gram(s) IV Push once  doxazosin 2 milliGRAM(s) Oral at bedtime  escitalopram Solution 10 milliGRAM(s) Oral daily  furosemide    Tablet 20 milliGRAM(s) Oral daily  glucagon  Injectable 1 milliGRAM(s) IntraMuscular once  heparin   Injectable 5000 Unit(s) SubCutaneous every 8 hours  insulin glargine Injectable (LANTUS) 14 Unit(s) SubCutaneous <User Schedule>  insulin lispro (ADMELOG) corrective regimen sliding scale   SubCutaneous every 6 hours  levETIRAcetam  Solution 125 milliGRAM(s) Oral two times a day  LORazepam     Tablet 0.5 milliGRAM(s) Oral at bedtime  melatonin 6 milliGRAM(s) Oral at bedtime  pantoprazole   Suspension 40 milliGRAM(s) Oral every 12 hours  petrolatum Ophthalmic Ointment 1 Application(s) Left EYE at bedtime  polyethylene glycol 3350 17 Gram(s) Oral daily  QUEtiapine 75 milliGRAM(s) Oral at bedtime  QUEtiapine 50 milliGRAM(s) Oral <User Schedule>  senna Syrup 10 milliLiter(s) Oral at bedtime  sucralfate suspension 1 Gram(s) Enteral Tube two times a day  ticagrelor 60 milliGRAM(s) Oral every 12 hours  valproic  acid Syrup 125 milliGRAM(s) Oral every 8 hours    MEDICATIONS  (PRN):  acetaminophen   Oral Liquid .. 650 milliGRAM(s) Oral every 6 hours PRN Temp greater or equal to 38C (100.4F), Mild Pain (1 - 3), Moderate Pain (4 - 6)  dextrose Oral Gel 15 Gram(s) Oral once PRN Blood Glucose LESS THAN 70 milliGRAM(s)/deciliter  LORazepam     Tablet 0.25 milliGRAM(s) Oral every 6 hours PRN severe agitation  QUEtiapine 12.5 milliGRAM(s) Oral every 6 hours PRN agitation      LABS:                        8.1    7.22  )-----------( 388      ( 26 Mar 2024 06:00 )             25.2     03-26    143  |  100  |  46<H>  ----------------------------<  119<H>  4.1   |  27  |  1.46<H>    Ca    8.9      26 Mar 2024 06:00  Phos  3.7     03-26  Mg     2.50     03-26    TPro  7.2  /  Alb  3.0<L>  /  TBili  0.2  /  DBili  <0.2  /  AST  19  /  ALT  12  /  AlkPhos  105  03-26      Urinalysis Basic - ( 26 Mar 2024 06:00 )    Color: x / Appearance: x / SG: x / pH: x  Gluc: 119 mg/dL / Ketone: x  / Bili: x / Urobili: x   Blood: x / Protein: x / Nitrite: x   Leuk Esterase: x / RBC: x / WBC x   Sq Epi: x / Non Sq Epi: x / Bacteria: x         CHIEF COMPLAINT:Patient is a 89y old  Male who presents with a chief complaint of COVID19, Chest pain (26 Mar 2024 15:11)      INTERVAL EVENTS: no overnight events. Planned for dc today.    ROS: Seen by bedside during AM rounds     OBJECTIVE:  ICU Vital Signs Last 24 Hrs  T(C): 36.4 (27 Mar 2024 04:00), Max: 36.5 (26 Mar 2024 08:00)  T(F): 97.5 (27 Mar 2024 04:00), Max: 97.7 (26 Mar 2024 08:00)  HR: 76 (27 Mar 2024 06:09) (76 - 86)  BP: 135/49 (27 Mar 2024 04:00) (129/51 - 140/55)  BP(mean): --  ABP: --  ABP(mean): --  RR: 18 (27 Mar 2024 04:00) (16 - 18)  SpO2: 100% (27 Mar 2024 06:09) (100% - 100%)    O2 Parameters below as of 27 Mar 2024 04:00  Patient On (Oxygen Delivery Method): ventilator    O2 Concentration (%): 30      Mode: AC/ CMV (Assist Control/ Continuous Mandatory Ventilation), RR (machine): 16, TV (machine): 400, FiO2: 30, PEEP: 5, ITime: 0.83, MAP: 13, PIP: 40    03-26 @ 07:01  -  03-27 @ 07:00  --------------------------------------------------------  IN: 1200 mL / OUT: 1005 mL / NET: 195 mL      CAPILLARY BLOOD GLUCOSE      POCT Blood Glucose.: 136 mg/dL (27 Mar 2024 04:52)      PHYSICAL EXAM:  General: NAD   Neck: (+) Trach tube noted, site c/d/i.  Cards: S1/S2, no murmurs   Pulm: CTA bilaterally. No wheezes.   Abdomen: Soft, NTND. (+) PEG noted, site c/d/i. (+)Biliary drain in anterior abdomen, draining dark brownish fluid.  Extremities: No pedal edema. Extremities warm to touch. Intact distal pulses.   Neurology: Sleeping but easily rouses. Nonverbal. Not following commands.   Skin: warm to touch, color appropriate for ethnicity. Refer to RN assessment for further details.      Mode: AC/ CMV (Assist Control/ Continuous Mandatory Ventilation)  RR (machine): 16  TV (machine): 400  FiO2: 30  PEEP: 5  ITime: 0.83  MAP: 13  PIP: 40      HOSPITAL MEDICATIONS:  MEDICATIONS  (STANDING):  albuterol/ipratropium for Nebulization 3 milliLiter(s) Nebulizer every 12 hours  artificial  tears Solution 1 Drop(s) Left EYE four times a day  aspirin  chewable 81 milliGRAM(s) Enteral Tube daily  carvedilol 6.25 milliGRAM(s) Oral every 12 hours  chlorhexidine 0.12% Liquid 15 milliLiter(s) Oral Mucosa every 12 hours  chlorhexidine 2% Cloths 1 Application(s) Topical daily  darbepoetin Injectable ViaL 60 MICROGram(s) SubCutaneous once  dextrose 5%. 1000 milliLiter(s) (100 mL/Hr) IV Continuous <Continuous>  dextrose 5%. 1000 milliLiter(s) (50 mL/Hr) IV Continuous <Continuous>  dextrose 50% Injectable 25 Gram(s) IV Push once  dextrose 50% Injectable 12.5 Gram(s) IV Push once  dextrose 50% Injectable 25 Gram(s) IV Push once  doxazosin 2 milliGRAM(s) Oral at bedtime  escitalopram Solution 10 milliGRAM(s) Oral daily  furosemide    Tablet 20 milliGRAM(s) Oral daily  glucagon  Injectable 1 milliGRAM(s) IntraMuscular once  heparin   Injectable 5000 Unit(s) SubCutaneous every 8 hours  insulin glargine Injectable (LANTUS) 14 Unit(s) SubCutaneous <User Schedule>  insulin lispro (ADMELOG) corrective regimen sliding scale   SubCutaneous every 6 hours  levETIRAcetam  Solution 125 milliGRAM(s) Oral two times a day  LORazepam     Tablet 0.5 milliGRAM(s) Oral at bedtime  melatonin 6 milliGRAM(s) Oral at bedtime  pantoprazole   Suspension 40 milliGRAM(s) Oral every 12 hours  petrolatum Ophthalmic Ointment 1 Application(s) Left EYE at bedtime  polyethylene glycol 3350 17 Gram(s) Oral daily  QUEtiapine 75 milliGRAM(s) Oral at bedtime  QUEtiapine 50 milliGRAM(s) Oral <User Schedule>  senna Syrup 10 milliLiter(s) Oral at bedtime  sucralfate suspension 1 Gram(s) Enteral Tube two times a day  ticagrelor 60 milliGRAM(s) Oral every 12 hours  valproic  acid Syrup 125 milliGRAM(s) Oral every 8 hours    MEDICATIONS  (PRN):  acetaminophen   Oral Liquid .. 650 milliGRAM(s) Oral every 6 hours PRN Temp greater or equal to 38C (100.4F), Mild Pain (1 - 3), Moderate Pain (4 - 6)  dextrose Oral Gel 15 Gram(s) Oral once PRN Blood Glucose LESS THAN 70 milliGRAM(s)/deciliter  LORazepam     Tablet 0.25 milliGRAM(s) Oral every 6 hours PRN severe agitation  QUEtiapine 12.5 milliGRAM(s) Oral every 6 hours PRN agitation      LABS:                        8.1    7.22  )-----------( 388      ( 26 Mar 2024 06:00 )             25.2     03-26    143  |  100  |  46<H>  ----------------------------<  119<H>  4.1   |  27  |  1.46<H>    Ca    8.9      26 Mar 2024 06:00  Phos  3.7     03-26  Mg     2.50     03-26    TPro  7.2  /  Alb  3.0<L>  /  TBili  0.2  /  DBili  <0.2  /  AST  19  /  ALT  12  /  AlkPhos  105  03-26      Urinalysis Basic - ( 26 Mar 2024 06:00 )    Color: x / Appearance: x / SG: x / pH: x  Gluc: 119 mg/dL / Ketone: x  / Bili: x / Urobili: x   Blood: x / Protein: x / Nitrite: x   Leuk Esterase: x / RBC: x / WBC x   Sq Epi: x / Non Sq Epi: x / Bacteria: x

## 2024-03-27 NOTE — DISCHARGE NOTE NURSING/CASE MANAGEMENT/SOCIAL WORK - NSDCPEFALRISK_GEN_ALL_CORE
For information on Fall & Injury Prevention, visit: [email or web address] OR  [email or web address] OR  https://www.cdc.gov/steadi/patient.html

## 2024-03-27 NOTE — PROGRESS NOTE ADULT - NS ATTEND OPT1 GEN_ALL_CORE

## 2024-03-27 NOTE — PROGRESS NOTE ADULT - NS_MD_PANP_GEN_ALL_CORE

## 2024-03-27 NOTE — DISCHARGE NOTE NURSING/CASE MANAGEMENT/SOCIAL WORK - PATIENT PORTAL LINK FT
You can access the FollowMyHealth Patient Portal offered by [facility] by registering at the following website: [email or web address]. By joining [employer]’s FollowMyHealth portal, you will also be able to view your health information using other applications (apps) compatible with our system.

## 2024-03-27 NOTE — PROGRESS NOTE ADULT - SUBJECTIVE AND OBJECTIVE BOX
NEPHROLOGY     Patient seen and examined with staff at bedside, pt trach to vent, no acute distress.   calm tolerating cpap   family at bedside       ROS ; unable to take due to mentation   MEDICATIONS  (STANDING):  albuterol/ipratropium for Nebulization 3 milliLiter(s) Nebulizer every 12 hours  artificial  tears Solution 1 Drop(s) Left EYE four times a day  aspirin  chewable 81 milliGRAM(s) Enteral Tube daily  carvedilol 6.25 milliGRAM(s) Oral every 12 hours  chlorhexidine 0.12% Liquid 15 milliLiter(s) Oral Mucosa every 12 hours  chlorhexidine 2% Cloths 1 Application(s) Topical daily  dextrose 5%. 1000 milliLiter(s) (100 mL/Hr) IV Continuous <Continuous>  dextrose 5%. 1000 milliLiter(s) (50 mL/Hr) IV Continuous <Continuous>  dextrose 50% Injectable 25 Gram(s) IV Push once  dextrose 50% Injectable 25 Gram(s) IV Push once  dextrose 50% Injectable 12.5 Gram(s) IV Push once  divalproex Sprinkle 125 milliGRAM(s) Oral two times a day  doxazosin 2 milliGRAM(s) Oral at bedtime  escitalopram Solution 10 milliGRAM(s) Oral daily  furosemide    Tablet 20 milliGRAM(s) Oral daily  glucagon  Injectable 1 milliGRAM(s) IntraMuscular once  heparin   Injectable 5000 Unit(s) SubCutaneous every 8 hours  hydrALAZINE 50 milliGRAM(s) Oral every 8 hours  insulin glargine Injectable (LANTUS) 14 Unit(s) SubCutaneous <User Schedule>  insulin lispro (ADMELOG) corrective regimen sliding scale   SubCutaneous every 6 hours  levETIRAcetam  Solution 500 milliGRAM(s) Oral two times a day  melatonin 6 milliGRAM(s) Oral at bedtime  pantoprazole   Suspension 40 milliGRAM(s) Oral every 12 hours  petrolatum Ophthalmic Ointment 1 Application(s) Left EYE at bedtime  polyethylene glycol 3350 17 Gram(s) Oral daily  senna Syrup 10 milliLiter(s) Oral at bedtime  sucralfate suspension 1 Gram(s) Enteral Tube two times a day  ticagrelor 60 milliGRAM(s) Oral every 12 hours    VITALS:  T(C): , Max: 37 (03-11-24 @ 11:51)  T(F): , Max: 98.6 (03-11-24 @ 11:51)  HR: 95 (03-12-24 @ 11:30)  BP: 113/52 (03-12-24 @ 09:00)  BP(mean): 57 (03-12-24 @ 09:00)  RR: 18 (03-12-24 @ 09:00)  SpO2: 97% (03-12-24 @ 11:30)  Wt(kg): --  I and O's:    03-11 @ 07:01  -  03-12 @ 07:00  --------------------------------------------------------  IN: 1450 mL / OUT: 20 mL / NET: 1430 mL    PHYSICAL EXAM:  Constitutional: trach to vent   HEENT: +trach  Respiratory: coarse bs   Cardiovascular: normal s1s2  Gastrointestinal: BS+, soft, NT/ND +PEG  Extremities:+ peripheral edema  : + external catheter   Skin: No rashes    LABS:                        8.0    8.32  )-----------( 340      ( 12 Mar 2024 05:30 )             25.5     03-12    137  |  97<L>  |  50<H>  ----------------------------<  190<H>  4.7   |  30  |  1.50<H>    Ca    9.0      12 Mar 2024 05:30  Phos  3.8     03-12  Mg     2.50     03-12    TPro  7.2  /  Alb  3.0<L>  /  TBili  0.3  /  DBili  <0.2  /  AST  22  /  ALT  19  /  AlkPhos  176<H>  03-12      Urine Studies:  Urinalysis Basic - ( 12 Mar 2024 05:30 )    Color: x / Appearance: x / SG: x / pH: x  Gluc: 190 mg/dL / Ketone: x  / Bili: x / Urobili: x   Blood: x / Protein: x / Nitrite: x   Leuk Esterase: x / RBC: x / WBC x   Sq Epi: x / Non Sq Epi: x / Bacteria: x    RADIOLOGY & ADDITIONAL STUDIES:    rad< from: US Duplex Venous Lower Ext Complete, Bilateral (03.11.24 @ 19:29) >  IMPRESSION:  No evidence of deep venous thrombosis in either lower extremity.    Increased venous pulsatility suggestive of right-sided cardiac   dysfunction.        --- End of Report ---      ADEBAYO PALACIO MD; Attending Radiologist  This document has been electronically signed. Mar 11 2024  7:37PM    < end of copied text >

## 2024-03-27 NOTE — PROGRESS NOTE ADULT - NS ATTEND BILL GEN_ALL_CORE
Attending to bill

## 2024-03-30 LAB
CULTURE RESULTS: SIGNIFICANT CHANGE UP
SPECIMEN SOURCE: SIGNIFICANT CHANGE UP

## 2024-05-09 NOTE — PROGRESS NOTE ADULT - REASON FOR ADMISSION
MA Note:   Patient is here with Mother for sick visit.    Vitals:    05/09/24 1639   Pulse: 91   Resp: 24   Temp: 99 °F (37.2 °C)       Assessment/Plan:  Emilia was seen today for cough and nasal congestion.    Diagnoses and all orders for this visit:    Acute bacterial conjunctivitis of both eyes  -     ofloxacin (Ocuflox) 0.3 % ophthalmic solution; Administer 1 drop to both eyes 2 (two) times a day for 5 days    Viral upper respiratory tract infection    Perioral dermatitis        Patient ID: Emilia Hollingsworth is a 3 y.o. female    HPI:  The patient is here with the mother for a sick visit.  The mom reports that she developed fever Tmax 101.1, nasal congestion, yellow eye discharge, rash around the mouth and nose.  No history of shortness of breath, vomiting, diarrhea.  Symptoms started 2 days ago and are not getting better.  Her sister is sick with similar sickness    Cough  Associated symptoms include a fever and a rash. Pertinent negatives include no chills, myalgias or wheezing.     Review of Systems:  Review of Systems   Constitutional:  Positive for fatigue and fever. Negative for chills.   HENT:  Positive for congestion.    Eyes:  Positive for discharge. Negative for itching.   Respiratory:  Positive for cough. Negative for wheezing.    Cardiovascular: Negative.    Gastrointestinal: Negative.    Endocrine: Negative.    Genitourinary: Negative.  Negative for dysuria and genital sores.   Musculoskeletal: Negative.  Negative for joint swelling and myalgias.   Skin:  Positive for rash.   Neurological: Negative.  Negative for weakness.   Hematological: Negative.    Psychiatric/Behavioral: Negative.  Negative for behavioral problems and sleep disturbance.    All other systems reviewed and are negative.      Physical Exam:  Physical Exam  Vitals and nursing note reviewed.   Constitutional:       Appearance: She is well-developed. She is not diaphoretic.   HENT:      Head: Normocephalic. No signs of injury.       Right Ear: Tympanic membrane normal. No drainage.      Left Ear: Tympanic membrane normal. No drainage.      Nose: Congestion and rhinorrhea present. No nasal deformity.      Comments: Crusty nasal discharge around the nose     Mouth/Throat:      Mouth: Mucous membranes are moist. No oral lesions.      Dentition: No dental caries.      Pharynx: Oropharynx is clear. Posterior oropharyngeal erythema present. No pharyngeal swelling or oropharyngeal exudate.      Tonsils: No tonsillar exudate.   Eyes:      General: Lids are normal.         Right eye: Discharge present.         Left eye: Discharge present.     Comments: Mild conjunctival injection bilaterally  Crusty discharge in the corners of the eyes and on eyelashes   Cardiovascular:      Rate and Rhythm: Normal rate and regular rhythm.      Heart sounds: No murmur heard.  Pulmonary:      Effort: Pulmonary effort is normal. No retractions.      Breath sounds: No stridor or decreased air movement. No wheezing, rhonchi or rales.   Abdominal:      General: Bowel sounds are normal.      Palpations: Abdomen is soft. There is no hepatomegaly or splenomegaly.      Tenderness: There is no abdominal tenderness.   Musculoskeletal:         General: Normal range of motion.      Cervical back: Normal range of motion and neck supple.   Skin:     General: Skin is warm.      Capillary Refill: Capillary refill takes less than 2 seconds.      Coloration: Skin is not pale.      Findings: No rash.      Comments: A few pink Papules around the mouth and nose   Neurological:      Mental Status: She is alert and oriented for age.      Gait: Gait normal.         Follow Up: Return if symptoms worsen or fail to improve, for Recheck.    Visit Discussion: Discussed with the mom findings on exam    Start eyedrops as prescribed    Saline spray as needed for nasal congestion, humidified air inhalation, oral hydration    Keep the area around the nose and mouth dry and clean    Contact precautions in  the family discussed    Patient Instructions   Conjunctivitis   WHAT YOU NEED TO KNOW:   Conjunctivitis, or pink eye, is inflammation of your conjunctiva. The conjunctiva is a thin tissue that covers the front of your eye and the back of your eyelid. The conjunctiva helps protect your eye and keep it moist. The most common cause of conjunctivitis is infection with bacteria or a virus. Allergies or exposure to a chemical may also cause conjunctivitis. Conjunctivitis is easily spread from person to person.       DISCHARGE INSTRUCTIONS:   Return to the emergency department if:   You have worsening eye pain.    The swelling in your eye gets worse, even after treatment.    Your vision suddenly becomes worse, or you cannot see at all.    Call your doctor if:   Your start to notice changes in your vision.    You develop a fever and ear pain.    You have tiny bumps or spots of blood on your eye.    You have questions or concerns about your condition or care.    Medicines:  You may need any of the following:  Allergy medicine  helps decrease itchy, red, swollen eyes caused by allergies. It may be given as a pill, eye drops, or nasal spray.    Antibiotics  may be needed if your conjunctivitis is caused by bacteria. This medicine may be given as a pill, eye drops, or eye ointment.    Take your medicine as directed.  Contact your healthcare provider if you think your medicine is not helping or if you have side effects. Tell your provider if you are allergic to any medicine. Keep a list of the medicines, vitamins, and herbs you take. Include the amounts, and when and why you take them. Bring the list or the pill bottles to follow-up visits. Carry your medicine list with you in case of an emergency.    Manage your symptoms:   Apply a cool compress.  Wet a washcloth with cold water and place it on your eye. This will help decrease itching and irritation.    Use artificial tears.  This will help lessen your symptoms, including  itching or irritation.    Do not wear contact lenses  until treatment is complete and your symptoms are gone.    Flush your eye.  You may need to flush your eye with saline to help decrease your symptoms. Ask for more information on how to flush your eye.    Prevent the spread of conjunctivitis:   Wash your hands with soap and water often.  Wash your hands before and after you touch your eyes. Wash your hands after you use the bathroom, change a child's diaper, or sneeze. Wash your hands before you prepare or eat food.         Avoid contact with others.  Do not share towels or washcloths. Try to stay away from others as much as possible. Ask when you can return to work or school.    Avoid allergens and irritants.  Try to avoid the things that cause your allergies, such as pets, dust, or grass. Stay away from smoke filled areas. Shield your eyes from wind and sun.    Throw away eye makeup.  Bacteria can stay in eye makeup. Throw away your current mascara and other eye makeup. Never share mascara or other eye makeup with anyone.    Follow up with your doctor as directed:  You may be referred to an ophthalmologist for treatment. Write down your questions so you remember to ask them during your visits.  © Copyright [employer] 2023 Information is for End User's use only and may not be sold, redistributed or otherwise used for commercial purposes.  The above information is an  only. It is not intended as medical advice for individual conditions or treatments. Talk to your doctor, nurse or pharmacist before following any medical regimen to see if it is safe and effective for you.     COVID19, Chest pain

## 2024-06-03 PROBLEM — N18.30 CHRONIC KIDNEY DISEASE, STAGE 3 UNSPECIFIED: Chronic | Status: ACTIVE | Noted: 2023-12-24

## 2024-06-03 PROBLEM — G25.3 MYOCLONUS: Chronic | Status: ACTIVE | Noted: 2023-12-24

## 2024-06-05 ENCOUNTER — OUTPATIENT (OUTPATIENT)
Dept: OUTPATIENT SERVICES | Facility: HOSPITAL | Age: 89
LOS: 1 days | End: 2024-06-05
Payer: MEDICARE

## 2024-06-05 ENCOUNTER — RESULT REVIEW (OUTPATIENT)
Age: 89
End: 2024-06-05

## 2024-06-05 VITALS
HEART RATE: 98 BPM | OXYGEN SATURATION: 100 % | SYSTOLIC BLOOD PRESSURE: 168 MMHG | TEMPERATURE: 98 F | DIASTOLIC BLOOD PRESSURE: 59 MMHG | RESPIRATION RATE: 18 BRPM

## 2024-06-05 VITALS
TEMPERATURE: 98 F | RESPIRATION RATE: 20 BRPM | DIASTOLIC BLOOD PRESSURE: 56 MMHG | SYSTOLIC BLOOD PRESSURE: 127 MMHG | OXYGEN SATURATION: 100 % | HEART RATE: 98 BPM

## 2024-06-05 DIAGNOSIS — Z95.5 PRESENCE OF CORONARY ANGIOPLASTY IMPLANT AND GRAFT: Chronic | ICD-10-CM

## 2024-06-05 DIAGNOSIS — Z98.89 OTHER SPECIFIED POSTPROCEDURAL STATES: Chronic | ICD-10-CM

## 2024-06-05 DIAGNOSIS — Z95.0 PRESENCE OF CARDIAC PACEMAKER: Chronic | ICD-10-CM

## 2024-06-05 PROCEDURE — 47536 EXCHANGE BILIARY DRG CATH: CPT

## 2024-06-08 ENCOUNTER — INPATIENT (INPATIENT)
Facility: HOSPITAL | Age: 89
LOS: 5 days | Discharge: HOME HEALTH | End: 2024-06-14
Attending: INTERNAL MEDICINE | Admitting: INTERNAL MEDICINE
Payer: MEDICARE

## 2024-06-08 VITALS
TEMPERATURE: 98 F | HEART RATE: 92 BPM | DIASTOLIC BLOOD PRESSURE: 65 MMHG | OXYGEN SATURATION: 98 % | SYSTOLIC BLOOD PRESSURE: 118 MMHG | RESPIRATION RATE: 16 BRPM

## 2024-06-08 DIAGNOSIS — Z95.0 PRESENCE OF CARDIAC PACEMAKER: Chronic | ICD-10-CM

## 2024-06-08 DIAGNOSIS — Z95.5 PRESENCE OF CORONARY ANGIOPLASTY IMPLANT AND GRAFT: Chronic | ICD-10-CM

## 2024-06-08 DIAGNOSIS — Z98.89 OTHER SPECIFIED POSTPROCEDURAL STATES: Chronic | ICD-10-CM

## 2024-06-08 LAB
ALBUMIN SERPL ELPH-MCNC: 3.7 G/DL — SIGNIFICANT CHANGE UP (ref 3.3–5)
ALP SERPL-CCNC: 162 U/L — HIGH (ref 40–120)
ALT FLD-CCNC: 25 U/L — SIGNIFICANT CHANGE UP (ref 4–41)
ANION GAP SERPL CALC-SCNC: 14 MMOL/L — SIGNIFICANT CHANGE UP (ref 7–14)
AST SERPL-CCNC: 24 U/L — SIGNIFICANT CHANGE UP (ref 4–40)
BASOPHILS # BLD AUTO: 0.05 K/UL — SIGNIFICANT CHANGE UP (ref 0–0.2)
BASOPHILS NFR BLD AUTO: 0.5 % — SIGNIFICANT CHANGE UP (ref 0–2)
BILIRUB SERPL-MCNC: 0.4 MG/DL — SIGNIFICANT CHANGE UP (ref 0.2–1.2)
BUN SERPL-MCNC: 80 MG/DL — HIGH (ref 7–23)
CALCIUM SERPL-MCNC: 9.7 MG/DL — SIGNIFICANT CHANGE UP (ref 8.4–10.5)
CHLORIDE SERPL-SCNC: 92 MMOL/L — LOW (ref 98–107)
CO2 SERPL-SCNC: 30 MMOL/L — SIGNIFICANT CHANGE UP (ref 22–31)
CREAT SERPL-MCNC: 1.54 MG/DL — HIGH (ref 0.5–1.3)
EGFR: 43 ML/MIN/1.73M2 — LOW
EOSINOPHIL # BLD AUTO: 0.41 K/UL — SIGNIFICANT CHANGE UP (ref 0–0.5)
EOSINOPHIL NFR BLD AUTO: 3.9 % — SIGNIFICANT CHANGE UP (ref 0–6)
GLUCOSE SERPL-MCNC: 257 MG/DL — HIGH (ref 70–99)
HCT VFR BLD CALC: 32.6 % — LOW (ref 39–50)
HGB BLD-MCNC: 10.8 G/DL — LOW (ref 13–17)
IANC: 6.56 K/UL — SIGNIFICANT CHANGE UP (ref 1.8–7.4)
IMM GRANULOCYTES NFR BLD AUTO: 0.7 % — SIGNIFICANT CHANGE UP (ref 0–0.9)
LIDOCAIN IGE QN: 32 U/L — SIGNIFICANT CHANGE UP (ref 7–60)
LYMPHOCYTES # BLD AUTO: 2.3 K/UL — SIGNIFICANT CHANGE UP (ref 1–3.3)
LYMPHOCYTES # BLD AUTO: 22.2 % — SIGNIFICANT CHANGE UP (ref 13–44)
MAGNESIUM SERPL-MCNC: 2.9 MG/DL — HIGH (ref 1.6–2.6)
MCHC RBC-ENTMCNC: 29.8 PG — SIGNIFICANT CHANGE UP (ref 27–34)
MCHC RBC-ENTMCNC: 33.1 GM/DL — SIGNIFICANT CHANGE UP (ref 32–36)
MCV RBC AUTO: 90.1 FL — SIGNIFICANT CHANGE UP (ref 80–100)
MONOCYTES # BLD AUTO: 0.99 K/UL — HIGH (ref 0–0.9)
MONOCYTES NFR BLD AUTO: 9.5 % — SIGNIFICANT CHANGE UP (ref 2–14)
NEUTROPHILS # BLD AUTO: 6.56 K/UL — SIGNIFICANT CHANGE UP (ref 1.8–7.4)
NEUTROPHILS NFR BLD AUTO: 63.2 % — SIGNIFICANT CHANGE UP (ref 43–77)
NRBC # BLD: 0 /100 WBCS — SIGNIFICANT CHANGE UP (ref 0–0)
NRBC # FLD: 0 K/UL — SIGNIFICANT CHANGE UP (ref 0–0)
NT-PROBNP SERPL-SCNC: 3473 PG/ML — HIGH
PHOSPHATE SERPL-MCNC: 4 MG/DL — SIGNIFICANT CHANGE UP (ref 2.5–4.5)
PLATELET # BLD AUTO: 315 K/UL — SIGNIFICANT CHANGE UP (ref 150–400)
POTASSIUM SERPL-MCNC: 4.2 MMOL/L — SIGNIFICANT CHANGE UP (ref 3.5–5.3)
POTASSIUM SERPL-SCNC: 4.2 MMOL/L — SIGNIFICANT CHANGE UP (ref 3.5–5.3)
PROT SERPL-MCNC: 9.4 G/DL — HIGH (ref 6–8.3)
RBC # BLD: 3.62 M/UL — LOW (ref 4.2–5.8)
RBC # FLD: 18.8 % — HIGH (ref 10.3–14.5)
SODIUM SERPL-SCNC: 136 MMOL/L — SIGNIFICANT CHANGE UP (ref 135–145)
TROPONIN T, HIGH SENSITIVITY RESULT: 104 NG/L — CRITICAL HIGH
WBC # BLD: 10.38 K/UL — SIGNIFICANT CHANGE UP (ref 3.8–10.5)
WBC # FLD AUTO: 10.38 K/UL — SIGNIFICANT CHANGE UP (ref 3.8–10.5)

## 2024-06-08 PROCEDURE — 99291 CRITICAL CARE FIRST HOUR: CPT

## 2024-06-08 PROCEDURE — 74177 CT ABD & PELVIS W/CONTRAST: CPT | Mod: 26,MC

## 2024-06-08 PROCEDURE — 71275 CT ANGIOGRAPHY CHEST: CPT | Mod: 26,MC

## 2024-06-08 PROCEDURE — 71046 X-RAY EXAM CHEST 2 VIEWS: CPT | Mod: 26

## 2024-06-08 RX ORDER — ACETAMINOPHEN 500 MG
1000 TABLET ORAL ONCE
Refills: 0 | Status: COMPLETED | OUTPATIENT
Start: 2024-06-08 | End: 2024-06-08

## 2024-06-08 RX ADMIN — Medication 400 MILLIGRAM(S): at 21:12

## 2024-06-08 NOTE — ED ADULT TRIAGE NOTE - CHIEF COMPLAINT QUOTE
c/o gall bladder drainage bag filled with bright red blood that was noted @ 4PM today and episode of CP this AM that was resolved by nitro + use of blood thinners. hx DM, CHF, CVA

## 2024-06-08 NOTE — ED PROVIDER NOTE - PHYSICAL EXAMINATION
Const: Awake, alert, no acute distress.  HEENT: NC/AT.  Moist mucous membranes.  No pharyngeal erythema, no exudates.  Eyes: Extraocular movements intact b/l.  Pupils equal, round, and reactive to light b/l.  Conjunctiva pink.  No scleral icterus.  Neck: Neck supple, full ROM without pain.  Cardiac: Regular rate and regular rhythm. S1 S2 present.  Peripheral pulses 2+ and symmetric.  mild b/l LE pitting edema.  No chest wall tenderness, no overlying skin changes.  Resp: decreased air entry b/l, breath sounds clear to auscultation b/l.  Abd: abomen distended, soft, percutaneous tube in RUQ with bloody drainage in bag, PEG tube in place with overlying tenderness, mild generalized tenderness, no guarding, no rigidity, no rebound tenderness.  No palpable masses.  MSK: Spine midline and non-tender, no paraspinal tenderness, no palpable deformities or overlying skin changes or open wounds. No CVAT.  Skin: Normal coloration.  No rashes, abrasions or lacerations.  Neuro: Awake, alert & oriented x 3.  Moves all extremities spontaneously and symmetrically.  No focal deficits.

## 2024-06-08 NOTE — ED ADULT NURSE NOTE - OBJECTIVE STATEMENT
A&OX1, awake, and alert, nonamb, bedbound, with family at bedside. h/o DM, CHF, CVA, HTN, HLD, peg tube. Patient is coming to ED in regards to bright red blood noted to gall bladder drain. As per son, patient was endorsing chest tightness and was given nitro and ASA. patient no longer endorses chest tightness at this time. patient placed on cardiac monitor NSR, RA, awaiting orders, will continue to monitor.

## 2024-06-08 NOTE — ED PROVIDER NOTE - PROGRESS NOTE DETAILS
Lambert TAVAREZ), PGY-1: Chart review shows patient with history CAD, CABG, pacemaker placement, diabetes, CKD, PVD, HTN, HLD, CVA, on Keppra for myoclonic jerks, initially presenting to the hospital in December 2023 for COVID pneumonia sepsis and MABLE, patient was intubated and also trached (trach since has been removed), had GI bleed history and to avoid lesion clipped, and cholecystitis with IR PERC Julissa tube placed 1/26, PEG tube placed 2/20, SMA calcification with stent placement, and pt was discharged to rehab march 27th 2024. Lambert TAVAREZ), PGY-1: spoke with cardiology Dr. Aashish Nicole for cardiology consult, his team will follow the pt inpatient.

## 2024-06-08 NOTE — ED PROVIDER NOTE - ATTENDING CONTRIBUTION TO CARE
I have personally performed a face to face medical and diagnostic evaluation of the patient. I have discussed with and reviewed the Resident's note and agree with the History, ROS, Physical Exam and MDM unless otherwise indicated. A brief summary of my personal evaluation and impression can be found below.    Hood FERREIRA: 89-year-old male history of CKD stroke MI neuropathy CAD recently admitted and discharged in March of this year with COVID progressive encephalopathy and MABLE, with SMA calcification and diagnostic lap with stent placement upper GI bleed patient was in the ICU for increased work of breathing and was intubated status post trach status post recent percutaneous cholecystostomy presents with a chief complaint of concern for bloody drainage from cholecystostomy tube associated with diffuse abdominal pain as well as endorsing Some Chest Discomfort and Shortness of Breath and Palpitations That Began Earlier Today.  No New Lower Extremity Swelling or Edema, Patient Is Also Complaining of Significant Rectal Pain with His Abdominal Pain, No Urinary Complaints.  No Fever.  No Rectal Bleeding According to Son.  Patient Is Arriving from Rehab.    All other ROS negative, except as above and as per HPI and ROS section.    VITALS: Initial triage and subsequent vitals have been reviewed by me.  GEN APPEARANCE: Alert, non-toxic, chronically ill appearing   HEAD: Atraumatic.  EYES: PERRLa, EOMI, vision grossly intact.   NECK: Supple  CV: RRR, S1S2, no c/r/m/g. Cap refill <2 seconds. No bruits.   LUNGS: diminished breath sounds b/l   ABDOMEN: diffuse abdominal tenderness prec asa drain w blood in collection bag  MSK/EXT: No spinal or extremity point tenderness. No CVA ttp. Pelvis stable. No peripheral edema.  NEURO: Alert, follows commands. Speech normal. Sensation and motor normal x4 extremities.   SKIN: Warm, dry and intact. No rash.  PSYCH: Appropriate  Rectal exam w  Rigo RN rectal exam w/o gross blood stage 2 deub ulcer near sacrum     Plan/MDM: exam vss non toxic but chronically ill appearing with PE as above ddx c/f complication/dislodgement of perc asa drain, consider fluid overload 2/2 chf, acs, less c/f PE, no fever less c/f infxn, given overall presentation will check labs cxr ct chest ctap urine give meds as needed, reassess, surgical consult as indicated, likely admit.

## 2024-06-08 NOTE — ED ADULT NURSE REASSESSMENT NOTE - NS ED NURSE REASSESS COMMENT FT1
Patient received laying on stretcher A&Ox1 family at bed side , breathing with ease, no signs of acute distress, complains of, IV patent and intact, will continue to monitor and assess

## 2024-06-08 NOTE — ED PROVIDER NOTE - OBJECTIVE STATEMENT
89-year-old M patient with history CVA, CABG, cardiac stents, HTN, HLD, DM, with recent admission (dec 2023 - march 2024) with perc asa tube placement, presenting for evaluation of chest pain and blood in choley tube bag today.  Patient noticed bloody drainage in the percutaneous cholecystostomy tube bag today, he also has been having mild generalized intermittent abdominal pain today.  At 1 PM today patient also began to have intermittent episodes of central midline chest pain discomfort, describing uneasiness.  Patient has been in a rehab facility since March, bedbound.  Denies fevers, chills, shortness of breath, nausea, vomiting, diarrhea, dysuria, hematuria, urinary frequency, LOC, headaches, numbness, tingling.

## 2024-06-08 NOTE — ED PROVIDER NOTE - IV ALTEPLASE EXCL ABS HIDDEN
show
labs and imaging reviewed.  Pt with septic shock 2/2 AMS.  pt became hypoxic in the ED down to 75% so patient was intubated for hypoxic respiratory failure and airway protection.  Sepsis protocol was initiated on arrival and given broad spectrum abx.  GI and Thoracic notified.  MICU consulted and will admit patient. Pt became hypotensive after fluid bolus so levophed was initiated.  MICU to admit patient.

## 2024-06-08 NOTE — ED PROVIDER NOTE - CLINICAL SUMMARY MEDICAL DECISION MAKING FREE TEXT BOX
This is a 9-year-old gentleman with extensive medical history, recent admission December to March with cholecystostomy tube placed, tube replaced 3 days ago, and patient noted blood in the bag today, also with generalized intermittent abdominal pain, also today new onset midline central chest discomfort, patient in a rehab facility, currently bedbound, mild generalized abdominal tenderness and distention, blood seen in the Choley tube drainage bag, decreased air entry bilaterally, will check CAT scan to evaluate percutaneous Choley tube and abdominal tenderness, also EKG, chest x-ray, troponins to evaluate for chest pain possible ACS, follow-up results reassess, patient will likely need admission for further cardiology workup.

## 2024-06-09 DIAGNOSIS — R41.89 OTHER SYMPTOMS AND SIGNS INVOLVING COGNITIVE FUNCTIONS AND AWARENESS: ICD-10-CM

## 2024-06-09 DIAGNOSIS — K55.1 CHRONIC VASCULAR DISORDERS OF INTESTINE: ICD-10-CM

## 2024-06-09 DIAGNOSIS — I10 ESSENTIAL (PRIMARY) HYPERTENSION: ICD-10-CM

## 2024-06-09 DIAGNOSIS — E11.9 TYPE 2 DIABETES MELLITUS WITHOUT COMPLICATIONS: ICD-10-CM

## 2024-06-09 DIAGNOSIS — I25.10 ATHEROSCLEROTIC HEART DISEASE OF NATIVE CORONARY ARTERY WITHOUT ANGINA PECTORIS: ICD-10-CM

## 2024-06-09 DIAGNOSIS — N18.30 CHRONIC KIDNEY DISEASE, STAGE 3 UNSPECIFIED: ICD-10-CM

## 2024-06-09 DIAGNOSIS — Z43.4 ENCOUNTER FOR ATTENTION TO OTHER ARTIFICIAL OPENINGS OF DIGESTIVE TRACT: ICD-10-CM

## 2024-06-09 DIAGNOSIS — I50.22 CHRONIC SYSTOLIC (CONGESTIVE) HEART FAILURE: ICD-10-CM

## 2024-06-09 DIAGNOSIS — R07.9 CHEST PAIN, UNSPECIFIED: ICD-10-CM

## 2024-06-09 LAB
ANION GAP SERPL CALC-SCNC: 14 MMOL/L — SIGNIFICANT CHANGE UP (ref 7–14)
APTT BLD: 26.4 SEC — SIGNIFICANT CHANGE UP (ref 24.5–35.6)
BUN SERPL-MCNC: 74 MG/DL — HIGH (ref 7–23)
CALCIUM SERPL-MCNC: 9.4 MG/DL — SIGNIFICANT CHANGE UP (ref 8.4–10.5)
CHLORIDE SERPL-SCNC: 96 MMOL/L — LOW (ref 98–107)
CO2 SERPL-SCNC: 28 MMOL/L — SIGNIFICANT CHANGE UP (ref 22–31)
CREAT SERPL-MCNC: 1.57 MG/DL — HIGH (ref 0.5–1.3)
EGFR: 42 ML/MIN/1.73M2 — LOW
GLUCOSE SERPL-MCNC: 209 MG/DL — HIGH (ref 70–99)
HCT VFR BLD CALC: 31 % — LOW (ref 39–50)
HGB BLD-MCNC: 10.1 G/DL — LOW (ref 13–17)
INR BLD: 1.03 RATIO — SIGNIFICANT CHANGE UP (ref 0.85–1.18)
MCHC RBC-ENTMCNC: 29.7 PG — SIGNIFICANT CHANGE UP (ref 27–34)
MCHC RBC-ENTMCNC: 32.6 GM/DL — SIGNIFICANT CHANGE UP (ref 32–36)
MCV RBC AUTO: 91.2 FL — SIGNIFICANT CHANGE UP (ref 80–100)
NRBC # BLD: 0 /100 WBCS — SIGNIFICANT CHANGE UP (ref 0–0)
NRBC # FLD: 0 K/UL — SIGNIFICANT CHANGE UP (ref 0–0)
PLATELET # BLD AUTO: 298 K/UL — SIGNIFICANT CHANGE UP (ref 150–400)
POTASSIUM SERPL-MCNC: 4 MMOL/L — SIGNIFICANT CHANGE UP (ref 3.5–5.3)
POTASSIUM SERPL-SCNC: 4 MMOL/L — SIGNIFICANT CHANGE UP (ref 3.5–5.3)
PROTHROM AB SERPL-ACNC: 11.5 SEC — SIGNIFICANT CHANGE UP (ref 9.5–13)
RBC # BLD: 3.4 M/UL — LOW (ref 4.2–5.8)
RBC # FLD: 19 % — HIGH (ref 10.3–14.5)
SODIUM SERPL-SCNC: 138 MMOL/L — SIGNIFICANT CHANGE UP (ref 135–145)
TROPONIN T, HIGH SENSITIVITY RESULT: 96 NG/L — CRITICAL HIGH
WBC # BLD: 9.61 K/UL — SIGNIFICANT CHANGE UP (ref 3.8–10.5)
WBC # FLD AUTO: 9.61 K/UL — SIGNIFICANT CHANGE UP (ref 3.8–10.5)

## 2024-06-09 PROCEDURE — 99223 1ST HOSP IP/OBS HIGH 75: CPT

## 2024-06-09 RX ORDER — IPRATROPIUM/ALBUTEROL SULFATE 18-103MCG
3 AEROSOL WITH ADAPTER (GRAM) INHALATION EVERY 6 HOURS
Refills: 0 | Status: DISCONTINUED | OUTPATIENT
Start: 2024-06-09 | End: 2024-06-14

## 2024-06-09 RX ORDER — TICAGRELOR 90 MG/1
60 TABLET ORAL EVERY 12 HOURS
Refills: 0 | Status: DISCONTINUED | OUTPATIENT
Start: 2024-06-09 | End: 2024-06-14

## 2024-06-09 RX ORDER — INSULIN GLARGINE 100 [IU]/ML
12 INJECTION, SOLUTION SUBCUTANEOUS ONCE
Refills: 0 | Status: COMPLETED | OUTPATIENT
Start: 2024-06-09 | End: 2024-06-09

## 2024-06-09 RX ORDER — DEXTROSE 50 % IN WATER 50 %
15 SYRINGE (ML) INTRAVENOUS ONCE
Refills: 0 | Status: DISCONTINUED | OUTPATIENT
Start: 2024-06-09 | End: 2024-06-14

## 2024-06-09 RX ORDER — SACCHAROMYCES BOULARDII 250 MG
250 POWDER IN PACKET (EA) ORAL
Refills: 0 | Status: DISCONTINUED | OUTPATIENT
Start: 2024-06-09 | End: 2024-06-10

## 2024-06-09 RX ORDER — SODIUM CHLORIDE 9 MG/ML
1000 INJECTION, SOLUTION INTRAVENOUS
Refills: 0 | Status: DISCONTINUED | OUTPATIENT
Start: 2024-06-09 | End: 2024-06-14

## 2024-06-09 RX ORDER — DEXTROSE 50 % IN WATER 50 %
25 SYRINGE (ML) INTRAVENOUS ONCE
Refills: 0 | Status: DISCONTINUED | OUTPATIENT
Start: 2024-06-09 | End: 2024-06-14

## 2024-06-09 RX ORDER — DEXTROSE 10 % IN WATER 10 %
125 INTRAVENOUS SOLUTION INTRAVENOUS ONCE
Refills: 0 | Status: DISCONTINUED | OUTPATIENT
Start: 2024-06-09 | End: 2024-06-14

## 2024-06-09 RX ORDER — LANOLIN ALCOHOL/MO/W.PET/CERES
6 CREAM (GRAM) TOPICAL AT BEDTIME
Refills: 0 | Status: DISCONTINUED | OUTPATIENT
Start: 2024-06-09 | End: 2024-06-14

## 2024-06-09 RX ORDER — SODIUM CHLORIDE 9 MG/ML
1000 INJECTION, SOLUTION INTRAVENOUS ONCE
Refills: 0 | Status: COMPLETED | OUTPATIENT
Start: 2024-06-09 | End: 2024-06-09

## 2024-06-09 RX ORDER — ACETAMINOPHEN 500 MG
650 TABLET ORAL EVERY 6 HOURS
Refills: 0 | Status: DISCONTINUED | OUTPATIENT
Start: 2024-06-09 | End: 2024-06-14

## 2024-06-09 RX ORDER — CARVEDILOL PHOSPHATE 80 MG/1
6.25 CAPSULE, EXTENDED RELEASE ORAL EVERY 12 HOURS
Refills: 0 | Status: DISCONTINUED | OUTPATIENT
Start: 2024-06-09 | End: 2024-06-14

## 2024-06-09 RX ORDER — LANOLIN ALCOHOL/MO/W.PET/CERES
5 CREAM (GRAM) TOPICAL AT BEDTIME
Refills: 0 | Status: DISCONTINUED | OUTPATIENT
Start: 2024-06-09 | End: 2024-06-09

## 2024-06-09 RX ORDER — ESCITALOPRAM OXALATE 10 MG/1
10 TABLET, FILM COATED ORAL DAILY
Refills: 0 | Status: DISCONTINUED | OUTPATIENT
Start: 2024-06-09 | End: 2024-06-14

## 2024-06-09 RX ORDER — INSULIN GLARGINE 100 [IU]/ML
12 INJECTION, SOLUTION SUBCUTANEOUS EVERY MORNING
Refills: 0 | Status: DISCONTINUED | OUTPATIENT
Start: 2024-06-09 | End: 2024-06-14

## 2024-06-09 RX ORDER — GLUCAGON INJECTION, SOLUTION 0.5 MG/.1ML
1 INJECTION, SOLUTION SUBCUTANEOUS ONCE
Refills: 0 | Status: DISCONTINUED | OUTPATIENT
Start: 2024-06-09 | End: 2024-06-14

## 2024-06-09 RX ORDER — INSULIN LISPRO 100/ML
VIAL (ML) SUBCUTANEOUS EVERY 6 HOURS
Refills: 0 | Status: DISCONTINUED | OUTPATIENT
Start: 2024-06-09 | End: 2024-06-14

## 2024-06-09 RX ORDER — DOXAZOSIN MESYLATE 4 MG
2 TABLET ORAL AT BEDTIME
Refills: 0 | Status: DISCONTINUED | OUTPATIENT
Start: 2024-06-09 | End: 2024-06-14

## 2024-06-09 RX ORDER — SUCRALFATE 1 G
1 TABLET ORAL
Refills: 0 | Status: DISCONTINUED | OUTPATIENT
Start: 2024-06-09 | End: 2024-06-14

## 2024-06-09 RX ORDER — VALPROIC ACID (AS SODIUM SALT) 250 MG/5ML
250 SOLUTION, ORAL ORAL THREE TIMES A DAY
Refills: 0 | Status: DISCONTINUED | OUTPATIENT
Start: 2024-06-09 | End: 2024-06-14

## 2024-06-09 RX ORDER — DEXTROSE 50 % IN WATER 50 %
12.5 SYRINGE (ML) INTRAVENOUS ONCE
Refills: 0 | Status: DISCONTINUED | OUTPATIENT
Start: 2024-06-09 | End: 2024-06-14

## 2024-06-09 RX ORDER — PANTOPRAZOLE SODIUM 20 MG/1
40 TABLET, DELAYED RELEASE ORAL
Refills: 0 | Status: DISCONTINUED | OUTPATIENT
Start: 2024-06-09 | End: 2024-06-10

## 2024-06-09 RX ORDER — ASPIRIN/CALCIUM CARB/MAGNESIUM 324 MG
81 TABLET ORAL DAILY
Refills: 0 | Status: DISCONTINUED | OUTPATIENT
Start: 2024-06-09 | End: 2024-06-14

## 2024-06-09 RX ADMIN — Medication 650 MILLIGRAM(S): at 23:04

## 2024-06-09 RX ADMIN — Medication 2: at 12:51

## 2024-06-09 RX ADMIN — Medication 20 MILLIGRAM(S): at 23:04

## 2024-06-09 RX ADMIN — Medication 1 GRAM(S): at 18:25

## 2024-06-09 RX ADMIN — SODIUM CHLORIDE 250 MILLILITER(S): 9 INJECTION, SOLUTION INTRAVENOUS at 03:00

## 2024-06-09 RX ADMIN — Medication 1 DROP(S): at 18:23

## 2024-06-09 RX ADMIN — CARVEDILOL PHOSPHATE 6.25 MILLIGRAM(S): 80 CAPSULE, EXTENDED RELEASE ORAL at 18:25

## 2024-06-09 RX ADMIN — Medication 250 MILLIGRAM(S): at 11:55

## 2024-06-09 RX ADMIN — TICAGRELOR 60 MILLIGRAM(S): 90 TABLET ORAL at 18:24

## 2024-06-09 RX ADMIN — Medication 3: at 23:21

## 2024-06-09 RX ADMIN — Medication 1 DROP(S): at 23:05

## 2024-06-09 RX ADMIN — INSULIN GLARGINE 12 UNIT(S): 100 INJECTION, SOLUTION SUBCUTANEOUS at 09:27

## 2024-06-09 RX ADMIN — Medication 6 MILLIGRAM(S): at 23:16

## 2024-06-09 RX ADMIN — ESCITALOPRAM OXALATE 10 MILLIGRAM(S): 10 TABLET, FILM COATED ORAL at 11:55

## 2024-06-09 RX ADMIN — Medication 250 MILLIGRAM(S): at 18:25

## 2024-06-09 RX ADMIN — Medication 3: at 18:23

## 2024-06-09 RX ADMIN — Medication 81 MILLIGRAM(S): at 11:54

## 2024-06-09 RX ADMIN — Medication 2 MILLIGRAM(S): at 23:03

## 2024-06-09 RX ADMIN — Medication 250 MILLIGRAM(S): at 23:04

## 2024-06-09 NOTE — H&P ADULT - PROBLEM SELECTOR PLAN 6
- At NH patient is on lantus 17 units daily and insulin sliding scale  - Provide reduce basal insulin here  - FS q6 hours and insulin sliding scale

## 2024-06-09 NOTE — CONSULT NOTE ADULT - SUBJECTIVE AND OBJECTIVE BOX
Aashish Boyer MD  Interventional Cardiology / Endovascular Specialist  [place] Office : [address] [place] [address] [place] [address]  Tel:   [place] Office : [address] [facility] [place] 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 Patient recently admitted for COVID PNA with hospital course complicated by encephalopathy and prolonged intubation requiring tracheostomy and PEG placement. Hospital course also complicated by mesenteric ischemia necessitating above SMA stent and cholecystitis necessitating cholecystostomy (patient was a poor surgical candidate). He was eventually discharged to [facility]. While there had tracheostomy decannulated, but still with PEG. Per patient's son's recently had cholecystostomy exchanged on 6/5 and since then has noted bloody output. No change in output and he denies fever, chills or pain. He is at baseline health. Patient with CT A&P showing cholecystostomy in place. Patient himself without complaints, though limited by cognitive impairment.  	  MEDICATIONS:  aspirin  chewable 81 milliGRAM(s) Oral daily  carvedilol 6.25 milliGRAM(s) Oral every 12 hours  doxazosin 2 milliGRAM(s) Oral at bedtime  ticagrelor 60 milliGRAM(s) Enteral Tube every 12 hours  torsemide 40 milliGRAM(s) Oral daily  torsemide 20 milliGRAM(s) Oral at bedtime      albuterol/ipratropium for Nebulization 3 milliLiter(s) Nebulizer every 6 hours PRN    acetaminophen   Oral Liquid .. 650 milliGRAM(s) Oral every 6 hours PRN  escitalopram Solution 10 milliGRAM(s) Oral daily  melatonin 6 milliGRAM(s) Oral at bedtime  valproic  acid Syrup 250 milliGRAM(s) Oral three times a day    pantoprazole    Tablet 40 milliGRAM(s) Oral before breakfast  sucralfate suspension 1 Gram(s) Oral two times a day    dextrose 50% Injectable 25 Gram(s) IV Push once  dextrose 50% Injectable 12.5 Gram(s) IV Push once  dextrose Oral Gel 15 Gram(s) Oral once PRN  glucagon  Injectable 1 milliGRAM(s) IntraMuscular once  insulin glargine Injectable (LANTUS) 12 Unit(s) SubCutaneous every morning  insulin lispro (ADMELOG) corrective regimen sliding scale   SubCutaneous every 6 hours    artificial tears (preservative free) Ophthalmic Solution 1 Drop(s) Both EYES four times a day  dextrose 10% Bolus 125 milliLiter(s) IV Bolus once  dextrose 5%. 1000 milliLiter(s) IV Continuous <Continuous>  dextrose 5%. 1000 milliLiter(s) IV Continuous <Continuous>      PAST MEDICAL/SURGICAL HISTORY  PAST MEDICAL & SURGICAL HISTORY:  Hyperlipemia      Hypertension      Coronary Artery Disease      Diabetes Mellitus Type II      Stented Coronary Artery  total 5 stents, last stent 5/2019      Neuropathy      Myocardial infarction      Stroke  mild left facial numbness   no other residuals verbalized      Myoclonic jerking      Stage 3 chronic kidney disease      History of Cataract Extraction      Hx of CABG      H/O coronary angiogram      S/P coronary artery stent placement  1/6/09      S/P placement of cardiac pacemaker          SOCIAL HISTORY: Substance Use (street drugs): ( x ) never used  (  ) other:    FAMILY HISTORY:  No pertinent family history in first degree relatives        PHYSICAL EXAM:  T(C): 37 (06-09-24 @ 20:40), Max: 37 (06-09-24 @ 20:40)  HR: 92 (06-09-24 @ 20:40) (82 - 95)  BP: 125/63 (06-09-24 @ 20:40) (125/63 - 143/64)  RR: 18 (06-09-24 @ 20:40) (17 - 20)  SpO2: 100% (06-09-24 @ 20:40) (97% - 100%)  Wt(kg): --  I&O's Summary    09 Jun 2024 07:01  -  09 Jun 2024 23:58  --------------------------------------------------------  IN: 430 mL / OUT: 0 mL / NET: 430 mL      Height (cm): 175.3 (06-09 @ 12:23)  Weight (kg): 68 (06-09 @ 12:23)  BMI (kg/m2): 22.1 (06-09 @ 12:23)  BSA (m2): 1.83 (06-09 @ 12:23)    GENERAL: NAD  EYES: conjunctiva and sclera clear  ENMT:   Moist mucous membranes, Good dentition, No lesions  Cardiovascular: Normal S1 S2, No JVD, No murmurs, No edema  Respiratory: Lungs clear to auscultation	  Gastrointestinal:  s/p PEG   Extremities: no edema                            10.1   9.61  )-----------( 298      ( 09 Jun 2024 11:06 )             31.0     06-09    138  |  96<L>  |  74<H>  ----------------------------<  209<H>  4.0   |  28  |  1.57<H>    Ca    9.4      09 Jun 2024 11:06  Phos  4.0     06-08  Mg     2.90     06-08    TPro  9.4<H>  /  Alb  3.7  /  TBili  0.4  /  DBili  x   /  AST  24  /  ALT  25  /  AlkPhos  162<H>  06-08    proBNP:   Lipid Profile:   HgA1c:   TSH:     Consultant(s) Notes Reviewed:  [x ] YES  [ ] NO    Care Discussed with Consultants/Other Providers [ x] YES  [ ] NO    Imaging Personally Reviewed independently:  [x] YES  [ ] NO    All labs, radiologic studies, vitals, orders and medications list reviewed. Patient is seen and examined at bedside. Case discussed with medical team.

## 2024-06-09 NOTE — H&P ADULT - PROBLEM SELECTOR PLAN 2
- Has low level troponinemia in the 100s (seems baseline)  - Continue with DAPT  - Continue with Coreg  - Cardiology consulted: Merlin

## 2024-06-09 NOTE — H&P ADULT - HISTORY OF PRESENT ILLNESS
89-year-old M patient with history CVA, CABG, cardiac stents, HTN, HLD, DM, with recent admission (dec 2023 - march 2024) with perc asa tube placement, presenting for evaluation of chest pain and blood in choley tube bag today.  Patient noticed bloody drainage in the percutaneous cholecystostomy tube bag today, he also has been having mild generalized intermittent abdominal pain today.  At 1 PM today patient also began to have intermittent episodes of central midline chest pain discomfort, describing uneasiness.  Patient has been in a rehab facility since March, bedbound.  Denies fevers, chills, shortness of breath, nausea, vomiting, diarrhea, dysuria, hematuria, urinary frequency, LOC, headaches, numbness, tingling.    Suboptimal evaluation of the peripheral pulmonary arteries, especially at   the lung bases. No central pulmonary embolus or secondary signs of right   heart strain.    Small to moderate right and small left pleural effusion, decreased since   1/16/2024. Recommend clinical correlation to assess underlying infection.    Cholecystostomy and percutaneous gastrostomy tube in place.    Additional findings as described. 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 Patient recently admitted for COVID PNA with hospital course complicated by encephalopathy and prolonged intubation requiring tracheostomy and PEG placement. Hospital course also complicated by mesenteric ischemia necessitating above SMA stent and cholecystitis necessitating cholecystostomy (patient was a poor surgical candidate). He was eventually discharged to [facility]. While there had tracheostomy decannulated, but still with PEG. Per patient's son's recently had cholecystostomy exchanged on 6/5 and since then has noted bloody output. No change in output and he denies fever, chills or pain. He is at baseline health. Patient with CT A&P showing cholecystostomy in place. Patient himself without complaints, though limited by cognitive impairment.

## 2024-06-09 NOTE — H&P ADULT - PROBLEM SELECTOR PLAN 4
- Stent placed in 12/24  - Currently on Aspirin and Brilinta  - Consider vascular if further guidance needed

## 2024-06-09 NOTE — PATIENT PROFILE ADULT - FALL HARM RISK - HARM RISK INTERVENTIONS

## 2024-06-09 NOTE — CHART NOTE - NSCHARTNOTEFT_GEN_A_CORE
89 M with PMH of acute asa s/p per asa 1/26.       photo from primary team reviewed: blood tinged bilious drainage.   CT abdomen was obtained on 6/8 demonstrating cholecystostomy tube within a decompressed gallbladder.   blood tinged drainage likely 2/2 irritation of tube against a decompressed gallbladder wall in the setting of antiplatelet therapy    Plan:  - No need for intervention at this time, continue to gravity drainage  - Flush cholecystostomy tube with 5-10ccs NS qd   - If no surgical intervention planned, patient will require routine 3 months exchanges  - ensure outpatient surgical followup for eventual cholecystectomy with tube removal. if surgery will never be an option, and documented, given acalculous cholecystitis, on next routine tube check, may consider capping trial given recurrent issues.     [id number] IR RACHELL Chavez MD   Interventional Radiology Chief resident/PGY6  Contact on TelemetryWeb Teams for nonemergent issues    - Nonemergent consults:  place [facility] order "Consult- Interventional Radiology", no page required  - Emergent issues (pager): [facility] [phone]; [facility] [phone]; [phone]; DO NOT PAGE FOR SCHEDULING QUESTIONS  - Scheduling questions 8am-6pm : [facility] [phone]; [facility] [phone],   - Clinic/outpatient booking: [facility] [phone]; [facility] [phone]

## 2024-06-09 NOTE — H&P ADULT - NSHPPHYSICALEXAM_GEN_ALL_CORE
T(C): 36.3 (06-09-24 @ 05:12), Max: 36.8 (06-09-24 @ 00:39)  HR: 82 (06-09-24 @ 05:12) (82 - 92)  BP: 135/64 (06-09-24 @ 05:12) (118/65 - 143/64)  RR: 17 (06-09-24 @ 05:12) (16 - 23)  SpO2: 98% (06-09-24 @ 05:12) (97% - 100%)    GEN: male in NAD, appears comfortable, no diaphoresis  EYES: No scleral injection, PERRL, EOMI  ENTM: neck supple & symmetric without tracheal deviation, moist membranes, no gross hearing impairment, prior trach site noted  CV: +S1/S2, no m/r/g, no abdominal bruit, no LE edema  RESP: breathing comfortably, no respiratory accessory muscle use, CTAB, no w/r/r  GI: normoactive BS, soft, NTND, no rebounding/guarding, no palpable masses; +PEG  LYMPHATICS: no LAD or tenderness to palpation  NEURO: CNII-XII grossly intact  PSYCH: No SI/HI/AVH, appropriate affect, poor insight/judgment   SKIN: no petechiae, ecchymosis or maculopapular rash noted

## 2024-06-09 NOTE — H&P ADULT - PROBLEM SELECTOR PLAN 1
In setting or recent exchange by IR on 6/5. In place on CT A&P    - IR evaluation  - Possibly bloody output is self-limiting. Hg is stable. If it continues would look into whether it can be removed (question whether he is a candidate for cholecystectomy at this time?). Patient also on DAPT with recent SMA placement in 12/24. Question whether one anti-platelet can be stopped (re: vascular cardiology consult)  - Monitor output

## 2024-06-09 NOTE — H&P ADULT - ASSESSMENT
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

## 2024-06-09 NOTE — CHART NOTE - NSCHARTNOTEFT_GEN_A_CORE
GI was notified for missing feeding tube adapter.     Extra adapters were provided. Tube feeds going.    Please call back if there are further issues.

## 2024-06-09 NOTE — H&P ADULT - PROBLEM SELECTOR PLAN 8
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 Recently decannulated trach, but still with PEG

## 2024-06-10 DIAGNOSIS — H61.20 IMPACTED CERUMEN, UNSPECIFIED EAR: ICD-10-CM

## 2024-06-10 LAB
ANION GAP SERPL CALC-SCNC: 16 MMOL/L — HIGH (ref 7–14)
BUN SERPL-MCNC: 83 MG/DL — HIGH (ref 7–23)
CALCIUM SERPL-MCNC: 9.7 MG/DL — SIGNIFICANT CHANGE UP (ref 8.4–10.5)
CHLORIDE SERPL-SCNC: 97 MMOL/L — LOW (ref 98–107)
CO2 SERPL-SCNC: 28 MMOL/L — SIGNIFICANT CHANGE UP (ref 22–31)
CREAT SERPL-MCNC: 1.95 MG/DL — HIGH (ref 0.5–1.3)
EGFR: 32 ML/MIN/1.73M2 — LOW
GLUCOSE BLDC GLUCOMTR-MCNC: 224 MG/DL — HIGH (ref 70–99)
GLUCOSE BLDC GLUCOMTR-MCNC: 298 MG/DL — HIGH (ref 70–99)
GLUCOSE BLDC GLUCOMTR-MCNC: 307 MG/DL — HIGH (ref 70–99)
GLUCOSE SERPL-MCNC: 198 MG/DL — HIGH (ref 70–99)
HCT VFR BLD CALC: 32.9 % — LOW (ref 39–50)
HGB BLD-MCNC: 10.8 G/DL — LOW (ref 13–17)
MCHC RBC-ENTMCNC: 30 PG — SIGNIFICANT CHANGE UP (ref 27–34)
MCHC RBC-ENTMCNC: 32.8 GM/DL — SIGNIFICANT CHANGE UP (ref 32–36)
MCV RBC AUTO: 91.4 FL — SIGNIFICANT CHANGE UP (ref 80–100)
NRBC # BLD: 0 /100 WBCS — SIGNIFICANT CHANGE UP (ref 0–0)
NRBC # FLD: 0 K/UL — SIGNIFICANT CHANGE UP (ref 0–0)
PLATELET # BLD AUTO: 291 K/UL — SIGNIFICANT CHANGE UP (ref 150–400)
POTASSIUM SERPL-MCNC: 4.3 MMOL/L — SIGNIFICANT CHANGE UP (ref 3.5–5.3)
POTASSIUM SERPL-SCNC: 4.3 MMOL/L — SIGNIFICANT CHANGE UP (ref 3.5–5.3)
RBC # BLD: 3.6 M/UL — LOW (ref 4.2–5.8)
RBC # FLD: 19.1 % — HIGH (ref 10.3–14.5)
SODIUM SERPL-SCNC: 141 MMOL/L — SIGNIFICANT CHANGE UP (ref 135–145)
WBC # BLD: 9.68 K/UL — SIGNIFICANT CHANGE UP (ref 3.8–10.5)
WBC # FLD AUTO: 9.68 K/UL — SIGNIFICANT CHANGE UP (ref 3.8–10.5)

## 2024-06-10 PROCEDURE — 74018 RADEX ABDOMEN 1 VIEW: CPT | Mod: 26

## 2024-06-10 RX ORDER — OXYMETAZOLINE HYDROCHLORIDE 0.5 MG/ML
1 SPRAY NASAL
Refills: 0 | Status: DISCONTINUED | OUTPATIENT
Start: 2024-06-10 | End: 2024-06-11

## 2024-06-10 RX ORDER — NYSTATIN CREAM 100000 [USP'U]/G
1 CREAM TOPICAL
Refills: 0 | Status: DISCONTINUED | OUTPATIENT
Start: 2024-06-10 | End: 2024-06-14

## 2024-06-10 RX ORDER — ACETAMINOPHEN 500 MG
1000 TABLET ORAL ONCE
Refills: 0 | Status: COMPLETED | OUTPATIENT
Start: 2024-06-10 | End: 2024-06-10

## 2024-06-10 RX ORDER — SENNA PLUS 8.6 MG/1
2 TABLET ORAL AT BEDTIME
Refills: 0 | Status: DISCONTINUED | OUTPATIENT
Start: 2024-06-10 | End: 2024-06-11

## 2024-06-10 RX ORDER — PANTOPRAZOLE SODIUM 20 MG/1
40 TABLET, DELAYED RELEASE ORAL DAILY
Refills: 0 | Status: DISCONTINUED | OUTPATIENT
Start: 2024-06-10 | End: 2024-06-14

## 2024-06-10 RX ADMIN — TICAGRELOR 60 MILLIGRAM(S): 90 TABLET ORAL at 17:44

## 2024-06-10 RX ADMIN — Medication 81 MILLIGRAM(S): at 12:50

## 2024-06-10 RX ADMIN — Medication 400 MILLIGRAM(S): at 21:31

## 2024-06-10 RX ADMIN — ESCITALOPRAM OXALATE 10 MILLIGRAM(S): 10 TABLET, FILM COATED ORAL at 12:51

## 2024-06-10 RX ADMIN — Medication 6 MILLIGRAM(S): at 21:37

## 2024-06-10 RX ADMIN — Medication 2: at 12:51

## 2024-06-10 RX ADMIN — Medication 1 DROP(S): at 12:51

## 2024-06-10 RX ADMIN — Medication 20 MILLIGRAM(S): at 21:37

## 2024-06-10 RX ADMIN — Medication 1 GRAM(S): at 05:14

## 2024-06-10 RX ADMIN — Medication 3: at 07:08

## 2024-06-10 RX ADMIN — INSULIN GLARGINE 12 UNIT(S): 100 INJECTION, SOLUTION SUBCUTANEOUS at 09:19

## 2024-06-10 RX ADMIN — SENNA PLUS 2 TABLET(S): 8.6 TABLET ORAL at 21:37

## 2024-06-10 RX ADMIN — CARVEDILOL PHOSPHATE 6.25 MILLIGRAM(S): 80 CAPSULE, EXTENDED RELEASE ORAL at 05:14

## 2024-06-10 RX ADMIN — Medication 1 DROP(S): at 21:35

## 2024-06-10 RX ADMIN — Medication 650 MILLIGRAM(S): at 00:04

## 2024-06-10 RX ADMIN — Medication 2 MILLIGRAM(S): at 21:35

## 2024-06-10 RX ADMIN — TICAGRELOR 60 MILLIGRAM(S): 90 TABLET ORAL at 05:14

## 2024-06-10 RX ADMIN — PANTOPRAZOLE SODIUM 40 MILLIGRAM(S): 20 TABLET, DELAYED RELEASE ORAL at 05:15

## 2024-06-10 RX ADMIN — Medication 1 DROP(S): at 05:15

## 2024-06-10 RX ADMIN — Medication 3: at 17:46

## 2024-06-10 RX ADMIN — Medication 250 MILLIGRAM(S): at 05:15

## 2024-06-10 RX ADMIN — OXYMETAZOLINE HYDROCHLORIDE 1 SPRAY(S): 0.5 SPRAY NASAL at 22:54

## 2024-06-10 RX ADMIN — Medication 1 GRAM(S): at 17:45

## 2024-06-10 RX ADMIN — Medication 40 MILLIGRAM(S): at 05:14

## 2024-06-10 RX ADMIN — Medication 1000 MILLIGRAM(S): at 22:31

## 2024-06-10 RX ADMIN — CARVEDILOL PHOSPHATE 6.25 MILLIGRAM(S): 80 CAPSULE, EXTENDED RELEASE ORAL at 17:44

## 2024-06-10 RX ADMIN — Medication 250 MILLIGRAM(S): at 21:33

## 2024-06-10 RX ADMIN — Medication 250 MILLIGRAM(S): at 12:50

## 2024-06-10 RX ADMIN — Medication 250 MILLIGRAM(S): at 05:14

## 2024-06-10 RX ADMIN — Medication 1 DROP(S): at 17:45

## 2024-06-10 NOTE — PHYSICAL THERAPY INITIAL EVALUATION ADULT - LEVEL OF INDEPENDENCE: SIT/SUPINE, REHAB EVAL
"  SUBJECTIVE:   Rylee A Manthey is a 9 year old female who presents to clinic today for the following health issues:      Rash  Onset: x 2 days     Description:   Location: forehead and scalp   Character: raised, red  Itching (Pruritis): no     Progression of Symptoms:  same    Accompanying Signs & Symptoms:  Fever: no   Body aches or joint pain: no   Sore throat symptoms: no   Recent cold symptoms: no     History:   Previous similar rash: no     Precipitating factors:   Exposure to similar rash: no   New exposures:   Shampoo    Recent travel: no     Alleviating factors:  none    Therapies Tried and outcome: cortisone cream - no relief         Problem list and histories reviewed & adjusted, as indicated.  Additional history:         Reviewed and updated as needed this visit by clinical staff  Tobacco  Allergies  Meds       Reviewed and updated as needed this visit by Provider      Further history obtained, clarified or corrected by physician:  Mother has been trying some different shampoos for her because of dandruff.  She tried a new one recently and now has some skin lesions that developed on the forehead near the hairline.    OBJECTIVE:  /69  Pulse 91  Temp 98.7  F (37.1  C)  Resp 16  Ht 4' 7.75\" (1.416 m)  Wt 121 lb (54.9 kg)  BMI 27.37 kg/m2  LUNGS: clear to auscultation, normal breath sounds  CV: RRR without murmur  ABD: BS+, soft, nontender, no masses, no hepatosplenomegaly  SKIN: She does have a few pimples that are scabbed and appear to be healing along the hairline of the forehead.    ASSESSMENT:  /   Dandruff in pediatric patient  Folliculitis    PLAN:  Orders Placed This Encounter     DERMATOLOGY REFERRAL       "
due to Pt presenting with weakness and increased drowsiness/unable to perform

## 2024-06-10 NOTE — CHART NOTE - NSCHARTNOTEFT_GEN_A_CORE
Overnight Medicine ACP Coverage    Informed by RN with patient with persistent bleeding from Left ear after attempted cerumen impaction removal during day. Given persistent bleeding from left ear covering ENT PA overnight consulted for recommendations given bleeding post procedure. Recommended by ENT to start afrin for bleeding.     Rangel Perez PA-C  Department of Medicine  [facility]  [id number] Overnight Medicine ACP Coverage    Informed by RN with patient with persistent bleeding from Left ear after attempted cerumen impaction removal during day. Attempted saline flushes and dressing changes to control bleeding.  Given persistent bleeding from left ear covering ENT PA overnight consulted for recommendations given bleeding post procedure. Recommended by ENT to start afrin for bleeding. Ordered for 2 days PRN    Rangel Perez PA-C  Department of Medicine  [facility]  [id number]

## 2024-06-10 NOTE — ADVANCED PRACTICE NURSE CONSULT - REASON FOR CONSULT
Patient seen on skin care rounds after wound care referral received for assessment of skin impairment and recommendations of topical management. 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   Chart reviewed: WBC 9.68, h/h 10.8/32.9, platelets 291, Reinaldo 13, BMI 22.1.      Grandson interviewed at bedside stating patient arrived from rehab facility, able to recognize him at times, complaining of pain to heels.

## 2024-06-10 NOTE — DIETITIAN INITIAL EVALUATION ADULT - NSFNSGIIOFT_GEN_A_CORE
06-09-24 @ 07:01  -  06-10-24 @ 07:00  --------------------------------------------------------  OUT:  Total OUT: 0 mL    Total NET: 990 mL      06-10-24 @ 07:01  -  06-10-24 @ 17:13  --------------------------------------------------------  OUT:  Total OUT: 0 mL    Total NET: 375 mL

## 2024-06-10 NOTE — PHYSICAL THERAPY INITIAL EVALUATION ADULT - ADDITIONAL COMMENTS
history obtained via pt's grandson at bedside. Pt was admitted from a rehab facility. Pt typically lives with his son in a house with 4 stairs to enter, +1 flight of stairs to his bedroom, +chair lift. per pt's grandson, Pt has not ambulated in 5-6 months due to being in the hospital and at rehab. at rehab, Pt was standing with a lot of assistance and was using a matthew lift for chair/bed transfers.     Pt. left comfortable in bed with all tubes/lines intact, head of the bed elevated, call bell in reach and in NAD. RN aware of session and pt current position.

## 2024-06-10 NOTE — DIETITIAN INITIAL EVALUATION ADULT - ADD RECOMMEND
1. Suggest changing TF regimen to 24hrs, Glucerna 1.5 with goal rate of 60ml/hr x 24hrs (1440ml, 2160kcal, 118.8gm pro, 1092.9ml free water from feed). Free water flushes per MD discretion.   2. Consider adding multivitamin, for micronutrient support.   3. Consider obtaining update HgA1c.   4. Monitor TF tolerance, weight, bowel movement, skin integrity, labs.

## 2024-06-10 NOTE — DIETITIAN INITIAL EVALUATION ADULT - PERTINENT LABORATORY DATA
06-10    141  |  97<L>  |  83<H>  ----------------------------<  198<H>  4.3   |  28  |  1.95<H>    Ca    9.7      10 Herb 2024 07:20  Phos  4.0     06-08  Mg     2.90     06-08    TPro  9.4<H>  /  Alb  3.7  /  TBili  0.4  /  DBili  x   /  AST  24  /  ALT  25  /  AlkPhos  162<H>  06-08  POCT Blood Glucose.: 224 mg/dL (06-10-24 @ 12:48)  A1C with Estimated Average Glucose Result: 9.3 % (12-24-23 @ 06:53)

## 2024-06-10 NOTE — CONSULT NOTE ADULT - REASON FOR ADMISSION
Bloody Output from Cholecystostomy

## 2024-06-10 NOTE — DIETITIAN INITIAL EVALUATION ADULT - NS FNS DIET ORDER
Diet, NPO with Tube Feed:   Tube Feeding Modality: Gastrostomy  Glucerna 1.5 Prince (GLUCERNA1.5RTH)  Total Volume for 24 Hours (mL): 1500  Continuous  Starting Tube Feed Rate {mL per Hour}: 40  Increase Tube Feed Rate by (mL): 10     Every hour  Until Goal Tube Feed Rate (mL per Hour): 75  Tube Feed Duration (in Hours): 20  Tube Feed Start Time: 07:00  Pump   Rate (mL per Hour): 55   Frequency: Every Hour    Duration (Hours): 20    Start Time: 07:00 (06-09-24 @ 08:21) [Active]

## 2024-06-10 NOTE — DIETITIAN INITIAL EVALUATION ADULT - ORAL INTAKE PTA/DIET HISTORY
Patient is from a nursing home. Per NH documentation, patient was receiving TF via G tube: Glucerna 1.5@75ml/hr x 20hrs (1500ml, 2225kcal, 125gm pro, 1138ml free water from feed) PTA. Patient has no known food allergies, per chart. Spoke with grandson by bedside, patient has some weight loss, with unknown timeframe and amount of weight loss. Per grandson, patient tolerated TF well in the NH.

## 2024-06-10 NOTE — CONSULT NOTE ADULT - PROBLEM SELECTOR RECOMMENDATION 9
1. Follow up out patient for removal after discharge. Call  to schedule an appointment No further ENT intervention at this time.

## 2024-06-10 NOTE — CONSULT NOTE ADULT - ASSESSMENT
89 year old with bilateral cerumen impaction 
89M with history of CAD s/p CABG s/p stents (last stent May 2022), s/p PPM, DM2, CKD (baseline Cr 1.2-1.3 as per family), PVD, HTN, HLD, CVA x3 presents wit    EKG SR RBBB (old per family)     1) Bleeding in Lynsey tube   - H/H stable   - can stop Brilinta if ok with vascular SMA stent in 12.23     2) CAD s/p CABG/LV dysfunction   - c/w asa lipitor     3) Afib  -hx of PAF  -no AC 2/2 GIB  -on coreg 6.25mg BID    4) LV dysfunction   - euvolemic on exam

## 2024-06-10 NOTE — DIETITIAN INITIAL EVALUATION ADULT - OTHER INFO
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     TF is on hold for medication during visit. Per RN by bedside, patient is receiving TF with Glucerna 1.5@75ml/hr (at goal rate), prior to medication, tolerates well. No recent reports of any nausea, vomiting, diarrhea, constipation reported at this time. Last bowel movement 6/9, per grandson. Noted a speech and swallow eval is ordered, per family request. Current weight: 68kg/149.9lbs (6/9, per RN flow sheet). Per Chava LUIS, weight history: 74.8kg (4/18), 79.3kg (1/19). Noted patient has weight loss of -6.8kg/-9%BW x 2 months and -11.3kg/-14.2%BWx 5 months. Noted patient is on torsemide, could cause weight change, continue to monitor weight trend. Last PwK9v-7.3%(12/24), consider obtaining update HgA1c. Current TF order: Glucerna 1.5 @75ml/hr x 20hrs (1500ml, 2250kcal, 123.8gm pro, 1138.5ml free water), meeting patient's estimated energy, protein needs. Suggest changing TF regimen to 24hrs, Glucerna 1.5 with goal rate of 60ml/hr x 24hrs (1440ml, 2160kcal, 118.8gm pro, 1092.9ml free water from feed). Free water flushes per MD discretion.

## 2024-06-10 NOTE — PHYSICAL THERAPY INITIAL EVALUATION ADULT - RANGE OF MOTION EXAMINATION, REHAB EVAL
bilateral shoulders: 0-90 degrees, bilateral elbows: 0-95 degrees/bilateral lower extremity ROM was WFL (within functional limits)/deficits as listed below

## 2024-06-10 NOTE — PROGRESS NOTE ADULT - SUBJECTIVE AND OBJECTIVE BOX
Date of Service  : 06-10-24     INTERVAL HPI/OVERNIGHT EVENTS: Seen and examined with nephew in room and son HCP on phone. Said not talking much ,more hard of hearing and blood in Cholecystostomy T.  Vital Signs Last 24 Hrs  T(C): 36.6 (10 Herb 2024 05:10), Max: 37 (09 Jun 2024 20:40)  T(F): 97.8 (10 Herb 2024 05:10), Max: 98.6 (09 Jun 2024 20:40)  HR: 93 (10 Herb 2024 05:10) (88 - 95)  BP: 121/58 (10 Herb 2024 05:10) (121/58 - 135/60)  BP(mean): --  RR: 16 (10 Herb 2024 05:10) (16 - 18)  SpO2: 100% (10 Herb 2024 05:10) (100% - 100%)    Parameters below as of 10 Herb 2024 05:10  Patient On (Oxygen Delivery Method): room air      I&O's Summary    09 Jun 2024 07:01  -  10 Herb 2024 07:00  --------------------------------------------------------  IN: 990 mL / OUT: 0 mL / NET: 990 mL    10 Herb 2024 07:01  -  10 Herb 2024 11:21  --------------------------------------------------------  IN: 375 mL / OUT: 400 mL / NET: -25 mL      MEDICATIONS  (STANDING):  artificial tears (preservative free) Ophthalmic Solution 1 Drop(s) Both EYES four times a day  aspirin  chewable 81 milliGRAM(s) Oral daily  carvedilol 6.25 milliGRAM(s) Oral every 12 hours  dextrose 10% Bolus 125 milliLiter(s) IV Bolus once  dextrose 5%. 1000 milliLiter(s) (100 mL/Hr) IV Continuous <Continuous>  dextrose 5%. 1000 milliLiter(s) (50 mL/Hr) IV Continuous <Continuous>  dextrose 50% Injectable 25 Gram(s) IV Push once  dextrose 50% Injectable 12.5 Gram(s) IV Push once  doxazosin 2 milliGRAM(s) Oral at bedtime  escitalopram Solution 10 milliGRAM(s) Oral daily  glucagon  Injectable 1 milliGRAM(s) IntraMuscular once  insulin glargine Injectable (LANTUS) 12 Unit(s) SubCutaneous every morning  insulin lispro (ADMELOG) corrective regimen sliding scale   SubCutaneous every 6 hours  melatonin 6 milliGRAM(s) Oral at bedtime  pantoprazole    Tablet 40 milliGRAM(s) Oral before breakfast  saccharomyces boulardii 250 milliGRAM(s) Oral two times a day  sucralfate suspension 1 Gram(s) Oral two times a day  ticagrelor 60 milliGRAM(s) Enteral Tube every 12 hours  torsemide 40 milliGRAM(s) Oral daily  torsemide 20 milliGRAM(s) Oral at bedtime  valproic  acid Syrup 250 milliGRAM(s) Oral three times a day    MEDICATIONS  (PRN):  acetaminophen   Oral Liquid .. 650 milliGRAM(s) Oral every 6 hours PRN Temp greater or equal to 38C (100.4F), Mild Pain (1 - 3)  albuterol/ipratropium for Nebulization 3 milliLiter(s) Nebulizer every 6 hours PRN Shortness of Breath and/or Wheezing  dextrose Oral Gel 15 Gram(s) Oral once PRN Blood Glucose LESS THAN 70 milliGRAM(s)/deciliter    LABS:                        10.8   9.68  )-----------( 291      ( 10 Herb 2024 07:20 )             32.9     06-10    141  |  97<L>  |  83<H>  ----------------------------<  198<H>  4.3   |  28  |  1.95<H>    Ca    9.7      10 Herb 2024 07:20  Phos  4.0     06-08  Mg     2.90     06-08    TPro  9.4<H>  /  Alb  3.7  /  TBili  0.4  /  DBili  x   /  AST  24  /  ALT  25  /  AlkPhos  162<H>  06-08    PT/INR - ( 09 Jun 2024 11:06 )   PT: 11.5 sec;   INR: 1.03 ratio         PTT - ( 09 Jun 2024 11:06 )  PTT:26.4 sec  Urinalysis Basic - ( 10 Herb 2024 07:20 )    Color: x / Appearance: x / SG: x / pH: x  Gluc: 198 mg/dL / Ketone: x  / Bili: x / Urobili: x   Blood: x / Protein: x / Nitrite: x   Leuk Esterase: x / RBC: x / WBC x   Sq Epi: x / Non Sq Epi: x / Bacteria: x      CAPILLARY BLOOD GLUCOSE      POCT Blood Glucose.: 203 mg/dL (10 Herb 2024 08:27)  POCT Blood Glucose.: 266 mg/dL (10 Herb 2024 07:06)  POCT Blood Glucose.: 285 mg/dL (09 Jun 2024 23:20)  POCT Blood Glucose.: 253 mg/dL (09 Jun 2024 17:58)  POCT Blood Glucose.: 237 mg/dL (09 Jun 2024 12:35)        Urinalysis Basic - ( 10 Herb 2024 07:20 )    Color: x / Appearance: x / SG: x / pH: x  Gluc: 198 mg/dL / Ketone: x  / Bili: x / Urobili: x   Blood: x / Protein: x / Nitrite: x   Leuk Esterase: x / RBC: x / WBC x   Sq Epi: x / Non Sq Epi: x / Bacteria: x        RADIOLOGY & ADDITIONAL TESTS:    Consultant(s) Notes Reviewed:  [x ] YES  [ ] NO    PHYSICAL EXAM:  GENERAL: Not in any distress ,  HEAD:  Atraumatic, Normocephalic  NECK: Supple, No JVD, Normal thyroid  NERVOUS SYSTEM:  Alert & communicating , No focal deficit   CHEST/LUNG: Good air entry bilateral with no  rales, rhonchi, wheezing, or rubs  HEART: Regular rate and rhythm; No murmurs, rubs, or gallops  ABDOMEN: Soft, Nontender, Nondistended; Bowel sounds present , PEG and CT +  EXTREMITIES:  2+ Peripheral Pulses, No clubbing, cyanosis, or edema    Care Discussed with Consultants/Other Providers [ x] YES  [ ] NO

## 2024-06-10 NOTE — ADVANCED PRACTICE NURSE CONSULT - ASSESSMENT
General: A&Ox1, pleasant, bedbound, two person turn. Incontinent of stool and urine- condom cathter in place draining clear yellow urine. Skin warm, fragile, thin and dry with increased moisture in intertriginous folds, Blanchable erythema on bilateral heels.     Sacrum difficult differential diagnosis macerated Stage 2 pressure injury vs moisture/incontinence associated dermatitis vs a combination of both measuring 3.5cmx1.5cmx0.2cm exposing macerated hyperkeratotic skin with scattered areas of denuded epidermis. Irregular borders. Increased moisture. Periphery with hyperpigmentation with purple hue suspicious of candidiasis. Tender to touch. No s/s of soft tissue infection.

## 2024-06-10 NOTE — ADVANCED PRACTICE NURSE CONSULT - RECOMMEDATIONS
Recommend A1C with next blood draw.     Topical recommendations:   -Bilateral heels- .Apply LBF daily, continue to offload.   -Sacrum- Cleanse with skin cleanser, pat dry. Apply antifungal powder, brush away excess, apply TRIAD paste twice a day and PRN with incontinent episodes. With episodes of incontinence only remove soiled layer of Triad, then reinforce with thin layer.     Continue low air loss bed therapy,  heel elevation with offloading boots, turn & reposition q2h with fluidized pillow, continue moisture management with barrier creams as specified above & single breathable pad, continue measures to decrease friction/shear.   Plan discussed with patients grandson- educated on topical wound therapy to optimize wound healing. Questions answered.     Findings and recs discussed with primary RN at bedside and ACP provider, Diane Rubin.   Please reconsult if any wound changes or if we can be of further assistance (ext [phone]).

## 2024-06-10 NOTE — PROGRESS NOTE ADULT - SUBJECTIVE AND OBJECTIVE BOX
Aashish Boyer MD  Interventional Cardiology / Endovascular Specialist  [place] Office : [phone] [place] [address] [place] [address]  Tel:   [place] Office : [address] [facility] [place] [address]  Tel: [phone]    Pt is lying in bed in NAD  	  MEDICATIONS:  aspirin  chewable 81 milliGRAM(s) Oral daily  carvedilol 6.25 milliGRAM(s) Oral every 12 hours  doxazosin 2 milliGRAM(s) Oral at bedtime  ticagrelor 60 milliGRAM(s) Enteral Tube every 12 hours  torsemide 40 milliGRAM(s) Oral daily  torsemide 20 milliGRAM(s) Oral at bedtime      albuterol/ipratropium for Nebulization 3 milliLiter(s) Nebulizer every 6 hours PRN    acetaminophen   Oral Liquid .. 650 milliGRAM(s) Oral every 6 hours PRN  escitalopram Solution 10 milliGRAM(s) Oral daily  melatonin 6 milliGRAM(s) Oral at bedtime  valproic  acid Syrup 250 milliGRAM(s) Oral three times a day    pantoprazole   Suspension 40 milliGRAM(s) Oral daily  sucralfate suspension 1 Gram(s) Oral two times a day    dextrose 50% Injectable 25 Gram(s) IV Push once  dextrose 50% Injectable 12.5 Gram(s) IV Push once  dextrose Oral Gel 15 Gram(s) Oral once PRN  glucagon  Injectable 1 milliGRAM(s) IntraMuscular once  insulin glargine Injectable (LANTUS) 12 Unit(s) SubCutaneous every morning  insulin lispro (ADMELOG) corrective regimen sliding scale   SubCutaneous every 6 hours    artificial tears (preservative free) Ophthalmic Solution 1 Drop(s) Both EYES four times a day  dextrose 10% Bolus 125 milliLiter(s) IV Bolus once  dextrose 5%. 1000 milliLiter(s) IV Continuous <Continuous>  dextrose 5%. 1000 milliLiter(s) IV Continuous <Continuous>      PAST MEDICAL/SURGICAL HISTORY  PAST MEDICAL & SURGICAL HISTORY:  Hyperlipemia      Hypertension      Coronary Artery Disease      Diabetes Mellitus Type II      Stented Coronary Artery  total 5 stents, last stent 5/2019      Neuropathy      Myocardial infarction      Stroke  mild left facial numbness   no other residuals verbalized      Myoclonic jerking      Stage 3 chronic kidney disease      History of Cataract Extraction      Hx of CABG      H/O coronary angiogram      S/P coronary artery stent placement  1/6/09      S/P placement of cardiac pacemaker          SOCIAL HISTORY: Substance Use (street drugs): ( x ) never used  (  ) other:    FAMILY HISTORY:  No pertinent family history in first degree relatives    PHYSICAL EXAM:  T(C): 37.1 (06-10-24 @ 12:45), Max: 37.1 (06-10-24 @ 12:45)  HR: 91 (06-10-24 @ 12:45) (91 - 95)  BP: 131/80 (06-10-24 @ 12:45) (121/58 - 132/56)  RR: 17 (06-10-24 @ 12:45) (16 - 18)  SpO2: 99% (06-10-24 @ 12:45) (99% - 100%)  Wt(kg): --  I&O's Summary    09 Jun 2024 07:01  -  10 Herb 2024 07:00  --------------------------------------------------------  IN: 990 mL / OUT: 0 mL / NET: 990 mL    10 Herb 2024 07:01  -  10 Herb 2024 15:57  --------------------------------------------------------  IN: 375 mL / OUT: 400 mL / NET: -25 mL          GENERAL: NAD  EYES: conjunctiva and sclera clear  ENMT:   Moist mucous membranes, Good dentition, No lesions  Cardiovascular: Normal S1 S2, No JVD, No murmurs, No edema  Respiratory: Lungs clear to auscultation	  Gastrointestinal:  s/p PEG   Extremities: no edema                              10.8   9.68  )-----------( 291      ( 10 Herb 2024 07:20 )             32.9     06-10    141  |  97<L>  |  83<H>  ----------------------------<  198<H>  4.3   |  28  |  1.95<H>    Ca    9.7      10 Herb 2024 07:20  Phos  4.0     06-08  Mg     2.90     06-08    TPro  9.4<H>  /  Alb  3.7  /  TBili  0.4  /  DBili  x   /  AST  24  /  ALT  25  /  AlkPhos  162<H>  06-08    proBNP:   Lipid Profile:   HgA1c:   TSH:     Consultant(s) Notes Reviewed:  [x ] YES  [ ] NO    Care Discussed with Consultants/Other Providers [ x] YES  [ ] NO    Imaging Personally Reviewed independently:  [x] YES  [ ] NO    All labs, radiologic studies, vitals, orders and medications list reviewed. Patient is seen and examined at bedside. Case discussed with medical team.

## 2024-06-10 NOTE — DIETITIAN INITIAL EVALUATION ADULT - PERTINENT MEDS FT
MEDICATIONS  (STANDING):  artificial tears (preservative free) Ophthalmic Solution 1 Drop(s) Both EYES four times a day  aspirin  chewable 81 milliGRAM(s) Oral daily  carvedilol 6.25 milliGRAM(s) Oral every 12 hours  dextrose 10% Bolus 125 milliLiter(s) IV Bolus once  dextrose 5%. 1000 milliLiter(s) (100 mL/Hr) IV Continuous <Continuous>  dextrose 5%. 1000 milliLiter(s) (50 mL/Hr) IV Continuous <Continuous>  dextrose 50% Injectable 25 Gram(s) IV Push once  dextrose 50% Injectable 12.5 Gram(s) IV Push once  doxazosin 2 milliGRAM(s) Oral at bedtime  escitalopram Solution 10 milliGRAM(s) Oral daily  glucagon  Injectable 1 milliGRAM(s) IntraMuscular once  insulin glargine Injectable (LANTUS) 12 Unit(s) SubCutaneous every morning  insulin lispro (ADMELOG) corrective regimen sliding scale   SubCutaneous every 6 hours  melatonin 6 milliGRAM(s) Oral at bedtime  pantoprazole   Suspension 40 milliGRAM(s) Oral daily  sucralfate suspension 1 Gram(s) Oral two times a day  ticagrelor 60 milliGRAM(s) Enteral Tube every 12 hours  torsemide 40 milliGRAM(s) Oral daily  torsemide 20 milliGRAM(s) Oral at bedtime  valproic  acid Syrup 250 milliGRAM(s) Oral three times a day    MEDICATIONS  (PRN):  acetaminophen   Oral Liquid .. 650 milliGRAM(s) Oral every 6 hours PRN Temp greater or equal to 38C (100.4F), Mild Pain (1 - 3)  albuterol/ipratropium for Nebulization 3 milliLiter(s) Nebulizer every 6 hours PRN Shortness of Breath and/or Wheezing  dextrose Oral Gel 15 Gram(s) Oral once PRN Blood Glucose LESS THAN 70 milliGRAM(s)/deciliter

## 2024-06-10 NOTE — CONSULT NOTE ADULT - SUBJECTIVE AND OBJECTIVE BOX
HPI: Mr. Foss is a 89 year-old man with history of multiple medical issues including hypertension, type 2 diabetes mellitus, coronary artery disease s/p CABG, stroke, chronic kidney disease. He is s/p admission for COVID pneumonia complicated by encephalopathy, and prolonged intubation with associated tracheostomy/PEG placement, as well as mesenteric ischemia requiring SMA stent and cholecystitis with cholecystostomy tube placement. He underwent decannulation of his tracheostomy at [place]. He presented 6/9/24 from [facility] to the [facility] ER with bloody cholecystostomy output for several days. Today, his creatinine was noted to rise from 1.57 to 1.95mg/dL; in light of this, a renal consultation was requested.    I see that Mr. Foss's present meds include Torsemide 40mg qam+20mg qpm, as well as Doxazosin 2mg qhs, and Coreg 6.25mg po bid  PAST MEDICAL & SURGICAL HISTORY:  Cognitive impairment  HTN  HLD  DM2  CAD - CABG/5 stents  PPM  DM neuropathy  CVA  CKD  Cataract extraction  COVID PNA  Tracheostomy  PEG  Cholecystitis - cholecystostomy    Allergies  fluoroquinolone antibiotics (Other)  Tegretol (Unknown)  carbamazepine (Other)    SOCIAL HISTORY:  Denies ETOh,Smoking,     FAMILY HISTORY:  No pertinent family history in first degree relatives    REVIEW OF SYSTEMS:  CONSTITUTIONAL: No weakness, fevers or chills  EYES/ENT: No visual changes;  No vertigo or throat pain   NECK: No pain or stiffness  RESPIRATORY: No cough, wheezing, hemoptysis; No shortness of breath  CARDIOVASCULAR: No chest pain or palpitations  GASTROINTESTINAL: (+)bloody cholecystostomy output  GENITOURINARY: No dysuria, frequency or hematuria  NEUROLOGICAL: No numbness or weakness  SKIN: No itching, burning, rashes, or lesions   All other review of systems is negative unless indicated above.    VITAL:  T(C): , Max: 37 (06-09-24 @ 20:40)  T(F): , Max: 98.6 (06-09-24 @ 20:40)  HR: 93 (06-10-24 @ 05:10)  BP: 121/58 (06-10-24 @ 05:10)  RR: 16 (06-10-24 @ 05:10)  SpO2: 100% (06-10-24 @ 05:10)    PHYSICAL EXAM:  Constitutional: NAD, Alert  HEENT: NCAT, MMM  Neck: Supple, No JVD  Respiratory: CTA-b/l  Cardiovascular: RRR s1s2, no m/r/g  Gastrointestinal: (+)cholecystostomy, (+)PEG  Extremities: No peripheral edema b/l  Neurological: (+)reduced generalized strength  Back: no CVAT b/l  Skin: No rashes, no nevi    LABS:                        10.8   9.68  )-----------( 291      ( 10 Herb 2024 07:20 )             32.9     Na(141)/K(4.3)/Cl(97)/HCO3(28)/BUN(83)/Cr(1.95)Glu(198)/Ca(9.7)/Mg(--)/PO4(--)    06-10 @ 07:20  Na(138)/K(4.0)/Cl(96)/HCO3(28)/BUN(74)/Cr(1.57)Glu(209)/Ca(9.4)/Mg(--)/PO4(--)    06-09 @ 11:06  Na(136)/K(4.2)/Cl(92)/HCO3(30)/BUN(80)/Cr(1.54)Glu(257)/Ca(9.7)/Mg(2.90)/PO4(4.0)    06-08 @ 21:10    (3/27/24) - BUN 46, Cr 1.39    IMAGING:  < from: CT Angio Chest PE Protocol w/ IV Cont (06.08.24 @ 23:57) >  KIDNEYS/URETERS: Bilateral perinephric stranding without   hydroureteronephrosis.  BLADDER: Partially distended. Wall thickening again noted.  REPRODUCTIVE ORGANS: Enlarged prostate.    < from: CT Abdomen and Pelvis w/ IV Cont (06.08.24 @ 23:57) >  Suboptimal evaluation of the peripheral pulmonary arteries, especially at   the lung bases. No central pulmonary embolus or secondary signs of right   heart strain.  Small to moderate right and small left pleural effusion, decreased since   1/16/2024. Recommend clinical correlation to assess underlying infection.  Cholecystostomy and percutaneous gastrostomy tube in place.        ASSESSMENT:  (1)CKD - stage 3b - unclear level of proteinuria - presumed due to DM/HTN  (2)MABLE - likely ATN from contrast nephropathy, s/p CT I+ in ER late 6/8/24      RECOMMEND:  (1)Can maintain diuretics/antihypertensives as ordered for now  (2)No further IV contrast for now  (3)BMP+Mg+PO4 daily  (4)Dose new meds for GFR 15ml/min (present dosing is acceptable)    Thank you for involving [facility] in this patient's care.    With warm regards,    Davey Chcako MD   [facility]  Office: [phone]  Cell: [phone]               HPI: Mr. Foss is a 89 year-old man with history of multiple medical issues including hypertension, type 2 diabetes mellitus, coronary artery disease s/p CABG, stroke, chronic kidney disease. He is s/p admission for COVID pneumonia complicated by encephalopathy, and prolonged intubation with associated tracheostomy/PEG placement, as well as mesenteric ischemia requiring SMA stent and cholecystitis with cholecystostomy tube placement. He underwent decannulation of his tracheostomy at [place]. He presented 6/9/24 from [facility] to the [facility] ER with bloody cholecystostomy output for several days. Today, his creatinine was noted to rise from 1.57 to 1.95mg/dL; in light of this, a renal consultation was requested.    I see that Mr. Foss's present meds include Torsemide 40mg qam+20mg qpm, as well as Doxazosin 2mg qhs, and Coreg 6.25mg po bid.    History largely provided by grandson, at bedside. He denies known history of CKD on the patient's behalf. He states that his uncle (patient's son) has stated of late that the patient would likely require further contrast studies; I explained that oral contrast would be safe to use, but that if iodinated IV contrast is required, we would have to wait until this present MABLE resolves first.    PAST MEDICAL & SURGICAL HISTORY:  Cognitive impairment  HTN  HLD  DM2  CAD - CABG/5 stents  PPM  DM neuropathy  CVA  CKD  Cataract extraction  COVID PNA  Tracheostomy  PEG  Cholecystitis - cholecystostomy    Allergies  fluoroquinolone antibiotics (Other)  Tegretol (Unknown)  carbamazepine (Other)    SOCIAL HISTORY:  Denies ETOh,Smoking,     FAMILY HISTORY:  No pertinent family history in first degree relatives    REVIEW OF SYSTEMS:  CONSTITUTIONAL: No weakness, fevers or chills  EYES/ENT: No visual changes;  No vertigo or throat pain   NECK: No pain or stiffness  RESPIRATORY: No cough, wheezing, hemoptysis; No shortness of breath  CARDIOVASCULAR: No chest pain or palpitations  GASTROINTESTINAL: (+)bloody cholecystostomy output  GENITOURINARY: No dysuria, frequency or hematuria  NEUROLOGICAL: No numbness or weakness  SKIN: No itching, burning, rashes, or lesions   All other review of systems is negative unless indicated above.    VITAL:  T(C): , Max: 37 (06-09-24 @ 20:40)  T(F): , Max: 98.6 (06-09-24 @ 20:40)  HR: 93 (06-10-24 @ 05:10)  BP: 121/58 (06-10-24 @ 05:10)  RR: 16 (06-10-24 @ 05:10)  SpO2: 100% (06-10-24 @ 05:10)    PHYSICAL EXAM:  Constitutional: frail, lethargic but alert  HEENT: NCAT, DMM  Neck: Supple, No JVD  Respiratory: CTA-b/l  Cardiovascular: RRR s1s2, no m/r/g  Gastrointestinal: (+)cholecystostomy, (+)PEG  Extremities: No peripheral edema b/l  Neurological: (+)reduced generalized strength; (+)coarse RUE tremor  Back: no CVAT b/l  Skin: No rashes, no nevi    LABS:                        10.8   9.68  )-----------( 291      ( 10 Herb 2024 07:20 )             32.9     Na(141)/K(4.3)/Cl(97)/HCO3(28)/BUN(83)/Cr(1.95)Glu(198)/Ca(9.7)/Mg(--)/PO4(--)    06-10 @ 07:20  Na(138)/K(4.0)/Cl(96)/HCO3(28)/BUN(74)/Cr(1.57)Glu(209)/Ca(9.4)/Mg(--)/PO4(--)    06-09 @ 11:06  Na(136)/K(4.2)/Cl(92)/HCO3(30)/BUN(80)/Cr(1.54)Glu(257)/Ca(9.7)/Mg(2.90)/PO4(4.0)    06-08 @ 21:10    (3/27/24) - BUN 46, Cr 1.39    IMAGING:  < from: CT Angio Chest PE Protocol w/ IV Cont (06.08.24 @ 23:57) >  KIDNEYS/URETERS: Bilateral perinephric stranding without   hydroureteronephrosis.  BLADDER: Partially distended. Wall thickening again noted.  REPRODUCTIVE ORGANS: Enlarged prostate.    < from: CT Abdomen and Pelvis w/ IV Cont (06.08.24 @ 23:57) >  Suboptimal evaluation of the peripheral pulmonary arteries, especially at   the lung bases. No central pulmonary embolus or secondary signs of right   heart strain.  Small to moderate right and small left pleural effusion, decreased since   1/16/2024. Recommend clinical correlation to assess underlying infection.  Cholecystostomy and percutaneous gastrostomy tube in place.        ASSESSMENT:  (1)CKD - stage 3b - unclear level of proteinuria - presumed due to DM/HTN  (2)MABLE - likely ATN from contrast nephropathy, s/p CT I+ in ER late 6/8/24      RECOMMEND:  (1)Can maintain diuretics/antihypertensives as ordered for now  (2)No further IV contrast for now; could plan for further IV contrast once MABLE resolves  (3)BMP+Mg+PO4 daily  (4)Dose new meds for GFR 15ml/min (present dosing is acceptable)    Thank you for involving [facility] in this patient's care.    With warm regards,    Davey Chacko MD   [facility]  Office: [phone]  Cell: [phone]

## 2024-06-10 NOTE — CONSULT NOTE ADULT - SUBJECTIVE AND OBJECTIVE BOX
CC: hearing loss.     HPI: 89 year old male admitted for cholecystotomy care and bleeding. Patient c/o hearing loss for 6 months. States he didn't have prior hearing loss. Denies any ear pain, drainage, tinnitus, vertigo, mastoid tenderness.       PAST MEDICAL & SURGICAL HISTORY:  Hyperlipemia      Hypertension      Coronary Artery Disease      Diabetes Mellitus Type II      Stented Coronary Artery  total 5 stents, last stent 5/2019      Neuropathy      Myocardial infarction      Stroke  mild left facial numbness   no other residuals verbalized      Myoclonic jerking      Stage 3 chronic kidney disease      History of Cataract Extraction      Hx of CABG      H/O coronary angiogram      S/P coronary artery stent placement  1/6/09      S/P placement of cardiac pacemaker        Allergies    Cipro (Unknown)  fluoroquinolone antibiotics (Other)  Tegretol (Unknown)  carbamazepine (Other)    Intolerances      MEDICATIONS  (STANDING):  artificial tears (preservative free) Ophthalmic Solution 1 Drop(s) Both EYES four times a day  aspirin  chewable 81 milliGRAM(s) Oral daily  carvedilol 6.25 milliGRAM(s) Oral every 12 hours  dextrose 10% Bolus 125 milliLiter(s) IV Bolus once  dextrose 5%. 1000 milliLiter(s) (100 mL/Hr) IV Continuous <Continuous>  dextrose 5%. 1000 milliLiter(s) (50 mL/Hr) IV Continuous <Continuous>  dextrose 50% Injectable 25 Gram(s) IV Push once  dextrose 50% Injectable 12.5 Gram(s) IV Push once  doxazosin 2 milliGRAM(s) Oral at bedtime  escitalopram Solution 10 milliGRAM(s) Oral daily  glucagon  Injectable 1 milliGRAM(s) IntraMuscular once  insulin glargine Injectable (LANTUS) 12 Unit(s) SubCutaneous every morning  insulin lispro (ADMELOG) corrective regimen sliding scale   SubCutaneous every 6 hours  melatonin 6 milliGRAM(s) Oral at bedtime  pantoprazole   Suspension 40 milliGRAM(s) Oral daily  sucralfate suspension 1 Gram(s) Oral two times a day  ticagrelor 60 milliGRAM(s) Enteral Tube every 12 hours  torsemide 40 milliGRAM(s) Oral daily  torsemide 20 milliGRAM(s) Oral at bedtime  valproic  acid Syrup 250 milliGRAM(s) Oral three times a day    MEDICATIONS  (PRN):  acetaminophen   Oral Liquid .. 650 milliGRAM(s) Oral every 6 hours PRN Temp greater or equal to 38C (100.4F), Mild Pain (1 - 3)  albuterol/ipratropium for Nebulization 3 milliLiter(s) Nebulizer every 6 hours PRN Shortness of Breath and/or Wheezing  dextrose Oral Gel 15 Gram(s) Oral once PRN Blood Glucose LESS THAN 70 milliGRAM(s)/deciliter      ROS:   ENT: all negative except as noted in HPI   CV: denies palpitations  Pulm: denies SOB, cough, hemoptysis  GI: denies change in apetite, indigestion, n/v  : denies pertinent urinary symptoms, urgency  Neuro: denies numbness/tingling, loss of sensation  Psych: denies anxiety  MS: denies muscle weakness, instability  Heme: denies easy bruising or bleeding  Endo: denies heat/cold intolerance, excessive sweating  Vascular: denies LE edema    Vital Signs Last 24 Hrs  T(C): 37.1 (10 Herb 2024 12:45), Max: 37.1 (10 Herb 2024 12:45)  T(F): 98.8 (10 Herb 2024 12:45), Max: 98.8 (10 Herb 2024 12:45)  HR: 91 (10 Herb 2024 12:45) (91 - 95)  BP: 131/80 (10 Herb 2024 12:45) (121/58 - 132/56)  BP(mean): --  RR: 17 (10 Herb 2024 12:45) (16 - 18)  SpO2: 99% (10 Herb 2024 12:45) (99% - 100%)    Parameters below as of 10 Herb 2024 12:45  Patient On (Oxygen Delivery Method): room air                              10.8   9.68  )-----------( 291      ( 10 Herb 2024 07:20 )             32.9    06-10    141  |  97<L>  |  83<H>  ----------------------------<  198<H>  4.3   |  28  |  1.95<H>    Ca    9.7      10 Herb 2024 07:20  Phos  4.0     06-08  Mg     2.90     06-08    TPro  9.4<H>  /  Alb  3.7  /  TBili  0.4  /  DBili  x   /  AST  24  /  ALT  25  /  AlkPhos  162<H>  06-08   PT/INR - ( 09 Jun 2024 11:06 )   PT: 11.5 sec;   INR: 1.03 ratio         PTT - ( 09 Jun 2024 11:06 )  PTT:26.4 sec    PHYSICAL EXAM:  Gen: NAD  Skin: No rashes, bruises, or lesions  Head: Normocephalic, Atraumatic  Face: no edema, erythema, or fluctuance. Parotid glands soft without mass  Eyes: no scleral injection  Ears: Right - ear canal with cerumen impaction  TM not visualized No evidence of any fluid drainage. No mastoid tenderness, erythema, or ear bulging, partially removed cerumen             Left - ear canal with cerumen impaction, TM not visualized.  No evidence of any fluid drainage. No mastoid tenderness, erythema, or ear bulging. unable to remove., EAC started bleeding and patient not tolerating   Nose: Nares bilaterally patent, no discharge  Mouth: No Stridor / Drooling / Trismus.  Mucosa moist, tongue/uvula midline, oropharynx clear  Neck: Flat, supple, no lymphadenopathy, trachea midline, no masses  Lymphatic: No lymphadenopathy  Resp: breathing easily, no stridor  CV: no peripheral edema/cyanosis  GI: nondistended   Peripheral vascular: no JVD or edema  Neuro: facial nerve intact, no facial droop

## 2024-06-11 DIAGNOSIS — Z46.59 ENCOUNTER FOR FITTING AND ADJUSTMENT OF OTHER GASTROINTESTINAL APPLIANCE AND DEVICE: ICD-10-CM

## 2024-06-11 DIAGNOSIS — K81.0 ACUTE CHOLECYSTITIS: ICD-10-CM

## 2024-06-11 LAB
A1C WITH ESTIMATED AVERAGE GLUCOSE RESULT: 7.7 % — HIGH (ref 4–5.6)
ALBUMIN SERPL ELPH-MCNC: 3.3 G/DL — SIGNIFICANT CHANGE UP (ref 3.3–5)
ALP SERPL-CCNC: 189 U/L — HIGH (ref 40–120)
ALT FLD-CCNC: 17 U/L — SIGNIFICANT CHANGE UP (ref 4–41)
ANION GAP SERPL CALC-SCNC: 17 MMOL/L — HIGH (ref 7–14)
AST SERPL-CCNC: 19 U/L — SIGNIFICANT CHANGE UP (ref 4–40)
BASE EXCESS BLDV CALC-SCNC: 9.5 MMOL/L — HIGH (ref -2–3)
BILIRUB SERPL-MCNC: 0.2 MG/DL — SIGNIFICANT CHANGE UP (ref 0.2–1.2)
BLOOD GAS VENOUS COMPREHENSIVE RESULT: SIGNIFICANT CHANGE UP
BUN SERPL-MCNC: 101 MG/DL — HIGH (ref 7–23)
CALCIUM SERPL-MCNC: 9.6 MG/DL — SIGNIFICANT CHANGE UP (ref 8.4–10.5)
CHLORIDE BLDV-SCNC: 100 MMOL/L — SIGNIFICANT CHANGE UP (ref 96–108)
CHLORIDE SERPL-SCNC: 98 MMOL/L — SIGNIFICANT CHANGE UP (ref 98–107)
CO2 BLDV-SCNC: 38.1 MMOL/L — HIGH (ref 22–26)
CO2 SERPL-SCNC: 28 MMOL/L — SIGNIFICANT CHANGE UP (ref 22–31)
CREAT SERPL-MCNC: 2.48 MG/DL — HIGH (ref 0.5–1.3)
EGFR: 24 ML/MIN/1.73M2 — LOW
ESTIMATED AVERAGE GLUCOSE: 174 — SIGNIFICANT CHANGE UP
GAS PNL BLDV: 141 MMOL/L — SIGNIFICANT CHANGE UP (ref 136–145)
GAS PNL BLDV: SIGNIFICANT CHANGE UP
GLUCOSE BLDC GLUCOMTR-MCNC: 100 MG/DL — HIGH (ref 70–99)
GLUCOSE BLDC GLUCOMTR-MCNC: 101 MG/DL — HIGH (ref 70–99)
GLUCOSE BLDC GLUCOMTR-MCNC: 163 MG/DL — HIGH (ref 70–99)
GLUCOSE BLDC GLUCOMTR-MCNC: 209 MG/DL — HIGH (ref 70–99)
GLUCOSE BLDC GLUCOMTR-MCNC: 246 MG/DL — HIGH (ref 70–99)
GLUCOSE BLDC GLUCOMTR-MCNC: 260 MG/DL — HIGH (ref 70–99)
GLUCOSE BLDC GLUCOMTR-MCNC: 313 MG/DL — HIGH (ref 70–99)
GLUCOSE BLDV-MCNC: 233 MG/DL — HIGH (ref 70–99)
GLUCOSE SERPL-MCNC: 209 MG/DL — HIGH (ref 70–99)
HCO3 BLDV-SCNC: 36 MMOL/L — HIGH (ref 22–29)
HCT VFR BLD CALC: 34.1 % — LOW (ref 39–50)
HCT VFR BLDA CALC: 34 % — LOW (ref 39–51)
HGB BLD CALC-MCNC: 11.3 G/DL — LOW (ref 12.6–17.4)
HGB BLD-MCNC: 10.7 G/DL — LOW (ref 13–17)
LACTATE BLDV-MCNC: 2.9 MMOL/L — HIGH (ref 0.5–2)
LACTATE SERPL-SCNC: 2.2 MMOL/L — HIGH (ref 0.5–2)
MAGNESIUM SERPL-MCNC: 3.1 MG/DL — HIGH (ref 1.6–2.6)
MCHC RBC-ENTMCNC: 29.6 PG — SIGNIFICANT CHANGE UP (ref 27–34)
MCHC RBC-ENTMCNC: 31.4 GM/DL — LOW (ref 32–36)
MCV RBC AUTO: 94.5 FL — SIGNIFICANT CHANGE UP (ref 80–100)
NRBC # BLD: 0 /100 WBCS — SIGNIFICANT CHANGE UP (ref 0–0)
NRBC # FLD: 0 K/UL — SIGNIFICANT CHANGE UP (ref 0–0)
PCO2 BLDV: 60 MMHG — HIGH (ref 42–55)
PH BLDV: 7.39 — SIGNIFICANT CHANGE UP (ref 7.32–7.43)
PHOSPHATE SERPL-MCNC: 5.4 MG/DL — HIGH (ref 2.5–4.5)
PLATELET # BLD AUTO: 307 K/UL — SIGNIFICANT CHANGE UP (ref 150–400)
PO2 BLDV: 38 MMHG — SIGNIFICANT CHANGE UP (ref 25–45)
POTASSIUM BLDV-SCNC: 4.7 MMOL/L — SIGNIFICANT CHANGE UP (ref 3.5–5.1)
POTASSIUM SERPL-MCNC: 4.5 MMOL/L — SIGNIFICANT CHANGE UP (ref 3.5–5.3)
POTASSIUM SERPL-SCNC: 4.5 MMOL/L — SIGNIFICANT CHANGE UP (ref 3.5–5.3)
PROT SERPL-MCNC: 8.6 G/DL — HIGH (ref 6–8.3)
RBC # BLD: 3.61 M/UL — LOW (ref 4.2–5.8)
RBC # FLD: 18.8 % — HIGH (ref 10.3–14.5)
SAO2 % BLDV: 57.1 % — LOW (ref 67–88)
SODIUM SERPL-SCNC: 143 MMOL/L — SIGNIFICANT CHANGE UP (ref 135–145)
WBC # BLD: 9.49 K/UL — SIGNIFICANT CHANGE UP (ref 3.8–10.5)
WBC # FLD AUTO: 9.49 K/UL — SIGNIFICANT CHANGE UP (ref 3.8–10.5)

## 2024-06-11 PROCEDURE — 71045 X-RAY EXAM CHEST 1 VIEW: CPT | Mod: 26

## 2024-06-11 RX ORDER — OXYMETAZOLINE HYDROCHLORIDE 0.5 MG/ML
1 SPRAY NASAL
Refills: 0 | Status: DISCONTINUED | OUTPATIENT
Start: 2024-06-11 | End: 2024-06-14

## 2024-06-11 RX ORDER — SENNA PLUS 8.6 MG/1
10 TABLET ORAL AT BEDTIME
Refills: 0 | Status: DISCONTINUED | OUTPATIENT
Start: 2024-06-11 | End: 2024-06-14

## 2024-06-11 RX ORDER — NEOMYCIN/POLYMYXIN B/HYDROCORT
5 SUSPENSION, DROPS(FINAL DOSAGE FORM)(ML) OTIC (EAR)
Refills: 0 | Status: COMPLETED | OUTPATIENT
Start: 2024-06-11 | End: 2024-06-13

## 2024-06-11 RX ORDER — POLYETHYLENE GLYCOL 3350 17 G/17G
17 POWDER, FOR SOLUTION ORAL ONCE
Refills: 0 | Status: DISCONTINUED | OUTPATIENT
Start: 2024-06-11 | End: 2024-06-14

## 2024-06-11 RX ORDER — SODIUM CHLORIDE 9 MG/ML
4 INJECTION INTRAMUSCULAR; INTRAVENOUS; SUBCUTANEOUS EVERY 8 HOURS
Refills: 0 | Status: DISCONTINUED | OUTPATIENT
Start: 2024-06-11 | End: 2024-06-14

## 2024-06-11 RX ORDER — CHLORHEXIDINE GLUCONATE 213 G/1000ML
1 SOLUTION TOPICAL DAILY
Refills: 0 | Status: DISCONTINUED | OUTPATIENT
Start: 2024-06-11 | End: 2024-06-14

## 2024-06-11 RX ORDER — ONDANSETRON 8 MG/1
4 TABLET, FILM COATED ORAL ONCE
Refills: 0 | Status: COMPLETED | OUTPATIENT
Start: 2024-06-11 | End: 2024-06-11

## 2024-06-11 RX ORDER — SODIUM CHLORIDE 9 MG/ML
250 INJECTION INTRAMUSCULAR; INTRAVENOUS; SUBCUTANEOUS ONCE
Refills: 0 | Status: COMPLETED | OUTPATIENT
Start: 2024-06-11 | End: 2024-06-11

## 2024-06-11 RX ADMIN — Medication 6 MILLIGRAM(S): at 21:32

## 2024-06-11 RX ADMIN — Medication 20 MILLIGRAM(S): at 21:28

## 2024-06-11 RX ADMIN — Medication 5 DROP(S): at 08:59

## 2024-06-11 RX ADMIN — Medication 1000 MILLIGRAM(S): at 06:01

## 2024-06-11 RX ADMIN — Medication 1 DROP(S): at 05:33

## 2024-06-11 RX ADMIN — Medication 1: at 12:31

## 2024-06-11 RX ADMIN — Medication 2: at 06:13

## 2024-06-11 RX ADMIN — Medication 1 DROP(S): at 17:22

## 2024-06-11 RX ADMIN — Medication 250 MILLIGRAM(S): at 05:33

## 2024-06-11 RX ADMIN — Medication 1 DROP(S): at 12:30

## 2024-06-11 RX ADMIN — Medication 1 GRAM(S): at 05:34

## 2024-06-11 RX ADMIN — Medication 4: at 00:04

## 2024-06-11 RX ADMIN — Medication 250 MILLIGRAM(S): at 12:31

## 2024-06-11 RX ADMIN — ONDANSETRON 4 MILLIGRAM(S): 8 TABLET, FILM COATED ORAL at 03:24

## 2024-06-11 RX ADMIN — Medication 2 MILLIGRAM(S): at 21:28

## 2024-06-11 RX ADMIN — CARVEDILOL PHOSPHATE 6.25 MILLIGRAM(S): 80 CAPSULE, EXTENDED RELEASE ORAL at 05:34

## 2024-06-11 RX ADMIN — Medication 81 MILLIGRAM(S): at 12:34

## 2024-06-11 RX ADMIN — ESCITALOPRAM OXALATE 10 MILLIGRAM(S): 10 TABLET, FILM COATED ORAL at 12:36

## 2024-06-11 RX ADMIN — Medication 5 DROP(S): at 17:24

## 2024-06-11 RX ADMIN — Medication 40 MILLIGRAM(S): at 05:27

## 2024-06-11 RX ADMIN — TICAGRELOR 60 MILLIGRAM(S): 90 TABLET ORAL at 21:27

## 2024-06-11 RX ADMIN — PANTOPRAZOLE SODIUM 40 MILLIGRAM(S): 20 TABLET, DELAYED RELEASE ORAL at 12:33

## 2024-06-11 RX ADMIN — Medication 1 GRAM(S): at 17:24

## 2024-06-11 RX ADMIN — Medication 250 MILLIGRAM(S): at 21:27

## 2024-06-11 RX ADMIN — CHLORHEXIDINE GLUCONATE 1 APPLICATION(S): 213 SOLUTION TOPICAL at 17:25

## 2024-06-11 RX ADMIN — Medication 1 DROP(S): at 23:42

## 2024-06-11 RX ADMIN — SODIUM CHLORIDE 4 MILLILITER(S): 9 INJECTION INTRAMUSCULAR; INTRAVENOUS; SUBCUTANEOUS at 04:34

## 2024-06-11 RX ADMIN — Medication 3 MILLILITER(S): at 04:34

## 2024-06-11 RX ADMIN — INSULIN GLARGINE 12 UNIT(S): 100 INJECTION, SOLUTION SUBCUTANEOUS at 08:56

## 2024-06-11 RX ADMIN — SODIUM CHLORIDE 250 MILLILITER(S): 9 INJECTION INTRAMUSCULAR; INTRAVENOUS; SUBCUTANEOUS at 05:30

## 2024-06-11 RX ADMIN — NYSTATIN CREAM 1 APPLICATION(S): 100000 CREAM TOPICAL at 17:23

## 2024-06-11 RX ADMIN — TICAGRELOR 60 MILLIGRAM(S): 90 TABLET ORAL at 09:00

## 2024-06-11 RX ADMIN — SENNA PLUS 10 MILLILITER(S): 8.6 TABLET ORAL at 21:54

## 2024-06-11 RX ADMIN — CARVEDILOL PHOSPHATE 6.25 MILLIGRAM(S): 80 CAPSULE, EXTENDED RELEASE ORAL at 17:23

## 2024-06-11 RX ADMIN — NYSTATIN CREAM 1 APPLICATION(S): 100000 CREAM TOPICAL at 06:46

## 2024-06-11 NOTE — PROVIDER CONTACT NOTE (OTHER) - ACTION/TREATMENT ORDERED:
TF stopped  suction provided  RRT called >> refer to RRT sheet  provider spoke to family at bedside after rapid to explain POC

## 2024-06-11 NOTE — PROGRESS NOTE ADULT - SUBJECTIVE AND OBJECTIVE BOX
Overnight events noted      VITAL:  T(C): , Max: 36.8 (06-10-24 @ 17:30)  T(F): , Max: 98.3 (06-11-24 @ 05:30)  HR: 89 (06-11-24 @ 12:00)  BP: 135/56 (06-11-24 @ 12:00)  BP(mean): --  RR: 17 (06-11-24 @ 12:00)  SpO2: 99% (06-11-24 @ 12:00)  Wt(kg): --      PHYSICAL EXAM:  Constitutional: frail, lethargic but alert  HEENT: NCAT, DMM  Neck: Supple, No JVD  Respiratory: CTA-b/l  Cardiovascular: RRR s1s2, no m/r/g  Gastrointestinal: (+)cholecystostomy, (+)PEG  Extremities: No peripheral edema b/l  Neurological: (+)reduced generalized strength; (+)coarse RUE tremor  Back: no CVAT b/l  Skin: No rashes, no nevi      LABS:                        10.7   9.49  )-----------( 307      ( 11 Jun 2024 04:04 )             34.1     Na(143)/K(4.5)/Cl(98)/HCO3(28)/BUN(101)/Cr(2.48)Glu(209)/Ca(9.6)/Mg(3.10)/PO4(5.4)    06-11 @ 04:04  Na(141)/K(4.3)/Cl(97)/HCO3(28)/BUN(83)/Cr(1.95)Glu(198)/Ca(9.7)/Mg(--)/PO4(--)    06-10 @ 07:20  Na(138)/K(4.0)/Cl(96)/HCO3(28)/BUN(74)/Cr(1.57)Glu(209)/Ca(9.4)/Mg(--)/PO4(--)    06-09 @ 11:06  Na(136)/K(4.2)/Cl(92)/HCO3(30)/BUN(80)/Cr(1.54)Glu(257)/Ca(9.7)/Mg(2.90)/PO4(4.0)    06-08 @ 21:10      IMPRESSION: 89M w/ HTN, DM2, CAD-CABG, CVA, CKD3, recent COVID PNA c/b trach/decannulation and PEG placement, and recent cholecystitis/cholecystostomy; 6/8/24 from SNF with bloody cholecystostomy output; c/b MABLE    (1)CKD - stage 3b - unclear level of proteinuria - presumed due to DM/HTN  (2)MABLE - ATN from contrast nephropathy, s/p CT I+ in ER late 6/8/24; numbers worsening; hopefully they level off over the next 1-2 days, such that we do not need to contemplate any dialysis here  (3)Lytes - grossly acceptable for now    RECOMMEND:  (1)Torsemide as ordered, in effort to maintain nonoliguric status  (2)No further IV contrast for now; could plan for further IV contrast once MABLE resolves  (3)BMP+Mg+PO4 daily  (4)Strict I/Os please  (5)Urine: UA, protein, creatinine, lytes  (6)Dose new meds for GFR 15ml/min (present dosing is acceptable)    Thank you for involving [facility] in this patient's care.      Davey Chacko MD  [facility]  Office/on call physician: [phone]  Cell (7a-7c): [phone]       No complaints  Grandson at bedside      VITAL:  T(C): , Max: 36.8 (06-10-24 @ 17:30)  T(F): , Max: 98.3 (06-11-24 @ 05:30)  HR: 89 (06-11-24 @ 12:00)  BP: 135/56 (06-11-24 @ 12:00)  BP(mean): --  RR: 17 (06-11-24 @ 12:00)  SpO2: 99% (06-11-24 @ 12:00)  Wt(kg): --      PHYSICAL EXAM:  Constitutional: frail, lethargic but alert  HEENT: NCAT, DMM  Neck: Supple, No JVD  Respiratory: CTA-b/l  Cardiovascular: RRR s1s2, no m/r/g  Gastrointestinal: (+)cholecystostomy, (+)PEG  Extremities: No peripheral edema b/l  Neurological: (+)reduced generalized strength; (+)coarse RUE tremor  Back: no CVAT b/l  Skin: No rashes, no nevi      LABS:                        10.7   9.49  )-----------( 307      ( 11 Jun 2024 04:04 )             34.1     Na(143)/K(4.5)/Cl(98)/HCO3(28)/BUN(101)/Cr(2.48)Glu(209)/Ca(9.6)/Mg(3.10)/PO4(5.4)    06-11 @ 04:04  Na(141)/K(4.3)/Cl(97)/HCO3(28)/BUN(83)/Cr(1.95)Glu(198)/Ca(9.7)/Mg(--)/PO4(--)    06-10 @ 07:20  Na(138)/K(4.0)/Cl(96)/HCO3(28)/BUN(74)/Cr(1.57)Glu(209)/Ca(9.4)/Mg(--)/PO4(--)    06-09 @ 11:06  Na(136)/K(4.2)/Cl(92)/HCO3(30)/BUN(80)/Cr(1.54)Glu(257)/Ca(9.7)/Mg(2.90)/PO4(4.0)    06-08 @ 21:10      IMPRESSION: 89M w/ HTN, DM2, CAD-CABG, CVA, CKD3, recent COVID PNA c/b trach/decannulation and PEG placement, and recent cholecystitis/cholecystostomy; 6/8/24 from SNF with bloody cholecystostomy output; c/b MABLE    (1)CKD - stage 3b - unclear level of proteinuria - presumed due to DM/HTN  (2)MABLE - ATN from contrast nephropathy, s/p CT I+ in ER late 6/8/24; numbers worsening; hopefully they level off over the next 1-2 days, such that we do not need to contemplate any dialysis here  (3)Lytes - grossly acceptable for now    RECOMMEND:  (1)Torsemide as ordered, in effort to maintain nonoliguric status  (2)No further IV contrast for now; could plan for further IV contrast once MABLE resolves  (3)BMP+Mg+PO4 daily  (4)Strict I/Os please  (5)Urine: UA, protein, creatinine, lytes  (6)Dose new meds for GFR 15ml/min (present dosing is acceptable)    counseled grandson at bedside; counseled son (physician) by phone - answered all questions to their satisfaction; advised that whereas I suspect dialysis will not be needed here, there is a chance that short-term dialysis will be required over the upcoming days          Davey Chacko MD  [facility]  Office/on call physician: [phone]  Cell (7a-7p): [phone]

## 2024-06-11 NOTE — RAPID RESPONSE TEAM SUMMARY - NSSITUATIONBACKGROUNDRRT_GEN_ALL_CORE
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  Pt currently on tube feeds. RRT called for concern for aspiration.    Upon arrival, pt getting suctioned by the primary nurse. VSS noncontributory. PE unremarkable. Pt breathing well without any accessory muscle use at baseline mentation. Nurse reports that pt with episodes of coughing and vomiting tube feeds earlier in the night. Tube feeds have been held. Primary team obtained an abd xray earlier in the night to assess for obstruction which did not reveal any acute findings. Pt appears at baseline without any acute needs. Given patient stability, decision was made to end the RRT in collaboration with primary team, BERENICE, and the nursing staff. All were in agreement and RRT was ended.    Recommendations:  [] aspiration precaution  [] HOB elevation  [] Hold tube feeds for now  [] Abx at the discretion of primary team  [] Can obtain chest Xray

## 2024-06-11 NOTE — SWALLOW BEDSIDE ASSESSMENT ADULT - ASR SWALLOW RECOMMEND DIAG
Cinesophagram for objective swallow assessment/ r/o silent aspiration given history of dysphagia, and recent history of trach/PEG & decannulation

## 2024-06-11 NOTE — SWALLOW BEDSIDE ASSESSMENT ADULT - H & P REVIEW
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 Patient recently admitted for COVID PNA with hospital course complicated by encephalopathy and prolonged intubation requiring tracheostomy and PEG placement. Hospital course also complicated by mesenteric ischemia necessitating above SMA stent and cholecystitis necessitating cholecystostomy (patient was a poor surgical candidate). He was eventually discharged to [place]. While there had tracheostomy decannulated, but still with PEG. Per patient's son's recently had cholecystostomy exchanged on 6/5 and since then has noted bloody output. No change in output and he denies fever, chills or pain. He is at baseline health. Patient with CT A&P showing cholecystostomy in place. Patient himself without complaints, though limited by cognitive impairment."/yes

## 2024-06-11 NOTE — PROGRESS NOTE ADULT - SUBJECTIVE AND OBJECTIVE BOX
ENT Progress Note    Interval:  ENT called to bedside due to persistent L EAC bleeding following attempted cerumen removal.   MEDICATIONS  (STANDING):  artificial tears (preservative free) Ophthalmic Solution 1 Drop(s) Both EYES four times a day  aspirin  chewable 81 milliGRAM(s) Oral daily  carvedilol 6.25 milliGRAM(s) Oral every 12 hours  dextrose 10% Bolus 125 milliLiter(s) IV Bolus once  dextrose 5%. 1000 milliLiter(s) (100 mL/Hr) IV Continuous <Continuous>  dextrose 5%. 1000 milliLiter(s) (50 mL/Hr) IV Continuous <Continuous>  dextrose 50% Injectable 25 Gram(s) IV Push once  dextrose 50% Injectable 12.5 Gram(s) IV Push once  doxazosin 2 milliGRAM(s) Oral at bedtime  escitalopram Solution 10 milliGRAM(s) Oral daily  glucagon  Injectable 1 milliGRAM(s) IntraMuscular once  insulin glargine Injectable (LANTUS) 12 Unit(s) SubCutaneous every morning  insulin lispro (ADMELOG) corrective regimen sliding scale   SubCutaneous every 6 hours  melatonin 6 milliGRAM(s) Oral at bedtime  nystatin Powder 1 Application(s) Topical two times a day  pantoprazole   Suspension 40 milliGRAM(s) Oral daily  polyethylene glycol 3350 17 Gram(s) Oral once  senna Syrup 10 milliLiter(s) Oral at bedtime  sodium chloride 0.9% Bolus 250 milliLiter(s) IV Bolus once  sodium chloride 3%  Inhalation 4 milliLiter(s) Inhalation every 8 hours  sucralfate suspension 1 Gram(s) Oral two times a day  ticagrelor 60 milliGRAM(s) Enteral Tube every 12 hours  torsemide 40 milliGRAM(s) Oral daily  torsemide 20 milliGRAM(s) Oral at bedtime  valproic  acid Syrup 250 milliGRAM(s) Oral three times a day    MEDICATIONS  (PRN):  acetaminophen   Oral Liquid .. 650 milliGRAM(s) Oral every 6 hours PRN Temp greater or equal to 38C (100.4F), Mild Pain (1 - 3)  albuterol/ipratropium for Nebulization 3 milliLiter(s) Nebulizer every 6 hours PRN Shortness of Breath and/or Wheezing  dextrose Oral Gel 15 Gram(s) Oral once PRN Blood Glucose LESS THAN 70 milliGRAM(s)/deciliter  oxymetazoline 0.05% Nasal Spray 1 Spray(s) Left Nostril two times a day PRN Bleeding from left ear        Vital Signs Last 24 Hrs  T(C): 36.7 (10 Herb 2024 19:50), Max: 37.1 (10 Herb 2024 12:45)  T(F): 98 (10 Herb 2024 19:50), Max: 98.8 (10 Herb 2024 12:45)  HR: 93 (11 Jun 2024 04:35) (91 - 93)  BP: 138/61 (10 Herb 2024 19:50) (131/80 - 138/61)  BP(mean): --  RR: 18 (10 Herb 2024 19:50) (17 - 18)  SpO2: 100% (11 Jun 2024 04:35) (99% - 100%)    Parameters below as of 11 Jun 2024 04:35  Patient On (Oxygen Delivery Method): room air        Physical Exam:  Alert, NAD  Nonlabored Respirations RA, no stridor or stertor   AD: grossly wnl, no overt signs of bleeding  AS: superficial abrasion across inferior EAC with bleeding, suctioned, EAC cerumen/debris suctioned, exam limited by pt cooperation though no additional sources of bleeding visualized      06-09-24 @ 07:01  -  06-10-24 @ 07:00  --------------------------------------------------------  IN: 990 mL / OUT: 0 mL / NET: 990 mL    06-10-24 @ 07:01  -  06-11-24 @ 05:28  --------------------------------------------------------  IN: 375 mL / OUT: 400 mL / NET: -25 mL                              10.7   9.49  )-----------( 307      ( 11 Jun 2024 04:04 )             34.1    06-11    143  |  98  |  101<H>  ----------------------------<  209<H>  4.5   |  28  |  2.48<H>    Ca    9.6      11 Jun 2024 04:04  Phos  5.4     06-11  Mg     3.10     06-11    TPro  8.6<H>  /  Alb  3.3  /  TBili  0.2  /  DBili  x   /  AST  19  /  ALT  17  /  AlkPhos  189<H>  06-11   PT/INR - ( 09 Jun 2024 11:06 )   PT: 11.5 sec;   INR: 1.03 ratio         PTT - ( 09 Jun 2024 11:06 )  PTT:26.4 sec      A/P: 89yMale seen by ENT earlier this AM for cerumen impaction now presents with L EAC bleeding since attempted cerumen removal.   Exam with inferior EAC superficial abrasion, blood and cerumen suctioned from ear, exam limited by pt cooperation though no additional sources visualized.     - rec afrin soaked 2x2s PRN in L ear  - rec corticosporin drops 5 drops BID to L ear x3 days

## 2024-06-11 NOTE — CHART NOTE - NSCHARTNOTEFT_GEN_A_CORE
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  Pt currently on tube feeds. RRT called for concern for aspiration. At time of assessment patient with persistent coughing. Patient suctioned orally and nasopharyngeally with improvement. Patient remained on RA throughout event.    VSS stable. PE unremarkable.     Abd xray performed earlier in the night to assess for obstruction given abdominal distension which showed Nonspecific bowel gas pattern.    Plan regarding potential aspiration event:  [] aspiration precaution  [] HOB elevation  [] Suction PRN  [] Hold tube feeds for now, NPO ordered  []  ordered   [] HTS 3% inhalation ordered  [] Zofran ordered  [] CXR ordered however unlikely to show aspiration at this time given recent event. Can consider repeat CXR or CT Chest later in the day  [] Discussed with Son (HCP) the possibility to starting antibiotics. Son at this time will like to defer ABx until results of CXR and bloodwork return. 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  Pt currently on tube feeds. RRT called by RN for concern for aspiration. At time of assessment patient with persistent coughing. Patient suctioned orally and nasopharyngeally with improvement. Patient remained on RA throughout event.    VSS stable. PE unremarkable.     Abd xray performed earlier in the night to assess for obstruction given abdominal distension which showed Nonspecific bowel gas pattern.    Plan regarding potential aspiration event:  [] aspiration precaution  [] HOB elevation  [] Suction PRN  [] Hold tube feeds for now, NPO ordered  []  ordered   [] HTS 3% inhalation ordered  [] Zofran ordered  [] CXR ordered however unlikely to show aspiration at this time given recent event. Can consider repeat CXR or CT Chest later in the day  [] Discussed with Son (HCP) the possibility to starting antibiotics. Son at this time will like to defer ABx until results of CXR and bloodwork return. At this time patient afebrile    Rangel Perez PA-C  Department of Medicine  [facility]  [id number]

## 2024-06-11 NOTE — PROGRESS NOTE ADULT - SUBJECTIVE AND OBJECTIVE BOX
Date of Service  : 06-11-24     INTERVAL HPI/OVERNIGHT EVENTS: I feel cold. Son in room.  Vital Signs Last 24 Hrs  T(C): 36.3 (11 Jun 2024 17:00), Max: 36.8 (11 Jun 2024 05:30)  T(F): 97.4 (11 Jun 2024 17:00), Max: 98.3 (11 Jun 2024 05:30)  HR: 84 (11 Jun 2024 17:00) (84 - 93)  BP: 144/52 (11 Jun 2024 17:00) (121/55 - 144/52)  BP(mean): --  RR: 17 (11 Jun 2024 17:00) (17 - 18)  SpO2: 100% (11 Jun 2024 17:00) (99% - 100%)    Parameters below as of 11 Jun 2024 17:00  Patient On (Oxygen Delivery Method): room air      I&O's Summary    10 Herb 2024 07:01  -  11 Jun 2024 07:00  --------------------------------------------------------  IN: 375 mL / OUT: 950 mL / NET: -575 mL    11 Jun 2024 07:01  -  11 Jun 2024 19:15  --------------------------------------------------------  IN: 150 mL / OUT: 550 mL / NET: -400 mL      MEDICATIONS  (STANDING):  artificial tears (preservative free) Ophthalmic Solution 1 Drop(s) Both EYES four times a day  aspirin  chewable 81 milliGRAM(s) Oral daily  carvedilol 6.25 milliGRAM(s) Oral every 12 hours  chlorhexidine 2% Cloths 1 Application(s) Topical daily  dextrose 10% Bolus 125 milliLiter(s) IV Bolus once  dextrose 5%. 1000 milliLiter(s) (100 mL/Hr) IV Continuous <Continuous>  dextrose 5%. 1000 milliLiter(s) (50 mL/Hr) IV Continuous <Continuous>  dextrose 50% Injectable 25 Gram(s) IV Push once  dextrose 50% Injectable 12.5 Gram(s) IV Push once  doxazosin 2 milliGRAM(s) Oral at bedtime  escitalopram Solution 10 milliGRAM(s) Oral daily  glucagon  Injectable 1 milliGRAM(s) IntraMuscular once  hydrocortisone/polymyxin/neomycin Suspension 5 Drop(s) Left Ear two times a day  insulin glargine Injectable (LANTUS) 12 Unit(s) SubCutaneous every morning  insulin lispro (ADMELOG) corrective regimen sliding scale   SubCutaneous every 6 hours  melatonin 6 milliGRAM(s) Oral at bedtime  nystatin Powder 1 Application(s) Topical two times a day  pantoprazole   Suspension 40 milliGRAM(s) Oral daily  polyethylene glycol 3350 17 Gram(s) Oral once  senna Syrup 10 milliLiter(s) Oral at bedtime  sodium chloride 3%  Inhalation 4 milliLiter(s) Inhalation every 8 hours  sucralfate suspension 1 Gram(s) Oral two times a day  ticagrelor 60 milliGRAM(s) Enteral Tube every 12 hours  torsemide 40 milliGRAM(s) Oral daily  torsemide 20 milliGRAM(s) Oral at bedtime  valproic  acid Syrup 250 milliGRAM(s) Oral three times a day    MEDICATIONS  (PRN):  acetaminophen   Oral Liquid .. 650 milliGRAM(s) Oral every 6 hours PRN Temp greater or equal to 38C (100.4F), Mild Pain (1 - 3)  albuterol/ipratropium for Nebulization 3 milliLiter(s) Nebulizer every 6 hours PRN Shortness of Breath and/or Wheezing  dextrose Oral Gel 15 Gram(s) Oral once PRN Blood Glucose LESS THAN 70 milliGRAM(s)/deciliter  oxymetazoline 0.05% Nasal Spray 1 Spray(s) Left Nostril two times a day PRN Bleeding from left ear    LABS:                        10.7   9.49  )-----------( 307      ( 11 Jun 2024 04:04 )             34.1     06-11    143  |  98  |  101<H>  ----------------------------<  209<H>  4.5   |  28  |  2.48<H>    Ca    9.6      11 Jun 2024 04:04  Phos  5.4     06-11  Mg     3.10     06-11    TPro  8.6<H>  /  Alb  3.3  /  TBili  0.2  /  DBili  x   /  AST  19  /  ALT  17  /  AlkPhos  189<H>  06-11      Urinalysis Basic - ( 11 Jun 2024 04:04 )    Color: x / Appearance: x / SG: x / pH: x  Gluc: 209 mg/dL / Ketone: x  / Bili: x / Urobili: x   Blood: x / Protein: x / Nitrite: x   Leuk Esterase: x / RBC: x / WBC x   Sq Epi: x / Non Sq Epi: x / Bacteria: x      CAPILLARY BLOOD GLUCOSE      POCT Blood Glucose.: 101 mg/dL (11 Jun 2024 17:14)  POCT Blood Glucose.: 163 mg/dL (11 Jun 2024 12:08)  POCT Blood Glucose.: 209 mg/dL (11 Jun 2024 08:26)  POCT Blood Glucose.: 246 mg/dL (11 Jun 2024 06:11)  POCT Blood Glucose.: 260 mg/dL (11 Jun 2024 03:16)  POCT Blood Glucose.: 313 mg/dL (11 Jun 2024 00:00)  POCT Blood Glucose.: 307 mg/dL (10 Herb 2024 22:11)        Urinalysis Basic - ( 11 Jun 2024 04:04 )    Color: x / Appearance: x / SG: x / pH: x  Gluc: 209 mg/dL / Ketone: x  / Bili: x / Urobili: x   Blood: x / Protein: x / Nitrite: x   Leuk Esterase: x / RBC: x / WBC x   Sq Epi: x / Non Sq Epi: x / Bacteria: x          Consultant(s) Notes Reviewed:  [x ] YES  [ ] NO    PHYSICAL EXAM:  GENERAL: Not in any distress ,  HEAD:  Atraumatic, Normocephalic  NECK: Supple, No JVD, Normal thyroid  NERVOUS SYSTEM:  Alert &  No focal deficit   CHEST/LUNG: Good air entry bilateral with no  rales, rhonchi, wheezing, or rubs  HEART: Regular rate and rhythm; No murmurs, rubs, or gallops  ABDOMEN: Soft, Nontender, Nondistended; Bowel sounds present PEG and Bilary drain+  EXTREMITIES:  2+ Peripheral Pulses, No clubbing, cyanosis, or edema    Care Discussed with Consultants/Other Providers [ x] YES  [ ] NO

## 2024-06-11 NOTE — SWALLOW BEDSIDE ASSESSMENT ADULT - SWALLOW EVAL: DIAGNOSIS
1. Mild oral dysphagia for puree and moderately thick liquid marked by reduced stripping of bolus from utensil, mild delay in anterior posterior bolus transfer, adequate oral clearance, suspect piecemeal deglutition (2 swallows per trial).  2.  Mild pharyngeal dysphagia for puree and moderately thick liquids suspected marked by suspected delay in swallow initiation and reduced hyolaryngeal excursion, no overt clinical signs of airway penetration/aspiration.

## 2024-06-11 NOTE — PROGRESS NOTE ADULT - SUBJECTIVE AND OBJECTIVE BOX
Aashish Boyer MD  Interventional Cardiology / Endovascular Specialist  [place] Office : [phone] [place] [address] [place] [address]  Tel:   [place] Office : [address] [facility] [place] [address]  Tel: [phone]    Pt is lying in bed in NAD  	  MEDICATIONS:  aspirin  chewable 81 milliGRAM(s) Oral daily  carvedilol 6.25 milliGRAM(s) Oral every 12 hours  doxazosin 2 milliGRAM(s) Oral at bedtime  ticagrelor 60 milliGRAM(s) Enteral Tube every 12 hours  torsemide 40 milliGRAM(s) Oral daily  torsemide 20 milliGRAM(s) Oral at bedtime      albuterol/ipratropium for Nebulization 3 milliLiter(s) Nebulizer every 6 hours PRN  sodium chloride 3%  Inhalation 4 milliLiter(s) Inhalation every 8 hours    acetaminophen   Oral Liquid .. 650 milliGRAM(s) Oral every 6 hours PRN  escitalopram Solution 10 milliGRAM(s) Oral daily  melatonin 6 milliGRAM(s) Oral at bedtime  valproic  acid Syrup 250 milliGRAM(s) Oral three times a day    pantoprazole   Suspension 40 milliGRAM(s) Oral daily  polyethylene glycol 3350 17 Gram(s) Oral once  senna Syrup 10 milliLiter(s) Oral at bedtime  sucralfate suspension 1 Gram(s) Oral two times a day    dextrose 50% Injectable 25 Gram(s) IV Push once  dextrose 50% Injectable 12.5 Gram(s) IV Push once  dextrose Oral Gel 15 Gram(s) Oral once PRN  glucagon  Injectable 1 milliGRAM(s) IntraMuscular once  insulin glargine Injectable (LANTUS) 12 Unit(s) SubCutaneous every morning  insulin lispro (ADMELOG) corrective regimen sliding scale   SubCutaneous every 6 hours    artificial tears (preservative free) Ophthalmic Solution 1 Drop(s) Both EYES four times a day  chlorhexidine 2% Cloths 1 Application(s) Topical daily  dextrose 10% Bolus 125 milliLiter(s) IV Bolus once  dextrose 5%. 1000 milliLiter(s) IV Continuous <Continuous>  dextrose 5%. 1000 milliLiter(s) IV Continuous <Continuous>  hydrocortisone/polymyxin/neomycin Suspension 5 Drop(s) Left Ear two times a day  nystatin Powder 1 Application(s) Topical two times a day  oxymetazoline 0.05% Nasal Spray 1 Spray(s) Left Nostril two times a day PRN      PAST MEDICAL/SURGICAL HISTORY  PAST MEDICAL & SURGICAL HISTORY:  Hyperlipemia      Hypertension      Coronary Artery Disease      Diabetes Mellitus Type II      Stented Coronary Artery  total 5 stents, last stent 5/2019      Neuropathy      Myocardial infarction      Stroke  mild left facial numbness   no other residuals verbalized      Myoclonic jerking      Stage 3 chronic kidney disease      History of Cataract Extraction      Hx of CABG      H/O coronary angiogram      S/P coronary artery stent placement  1/6/09      S/P placement of cardiac pacemaker          SOCIAL HISTORY: Substance Use (street drugs): ( x ) never used  (  ) other:    FAMILY HISTORY:  No pertinent family history in first degree relatives      PHYSICAL EXAM:  T(C): 36.3 (06-11-24 @ 12:00), Max: 36.8 (06-10-24 @ 17:30)  HR: 89 (06-11-24 @ 12:00) (89 - 93)  BP: 135/56 (06-11-24 @ 12:00) (121/55 - 138/61)  RR: 17 (06-11-24 @ 12:00) (17 - 18)  SpO2: 99% (06-11-24 @ 12:00) (99% - 100%)  Wt(kg): --  I&O's Summary    10 Herb 2024 07:01  -  11 Jun 2024 07:00  --------------------------------------------------------  IN: 375 mL / OUT: 950 mL / NET: -575 mL    11 Jun 2024 07:01  -  11 Jun 2024 15:02  --------------------------------------------------------  IN: 0 mL / OUT: 50 mL / NET: -50 mL          GENERAL: NAD  EYES: conjunctiva and sclera clear  ENMT:   Moist mucous membranes, Good dentition, No lesions  Cardiovascular: Normal S1 S2, No JVD, No murmurs, No edema  Respiratory: Lungs clear to auscultation	  Gastrointestinal:  s/p PEG   Extremities: no edema                                  10.7   9.49  )-----------( 307      ( 11 Jun 2024 04:04 )             34.1     06-11    143  |  98  |  101<H>  ----------------------------<  209<H>  4.5   |  28  |  2.48<H>    Ca    9.6      11 Jun 2024 04:04  Phos  5.4     06-11  Mg     3.10     06-11    TPro  8.6<H>  /  Alb  3.3  /  TBili  0.2  /  DBili  x   /  AST  19  /  ALT  17  /  AlkPhos  189<H>  06-11    proBNP:   Lipid Profile:   HgA1c:   TSH:     Consultant(s) Notes Reviewed:  [x ] YES  [ ] NO    Care Discussed with Consultants/Other Providers [ x] YES  [ ] NO    Imaging Personally Reviewed independently:  [x] YES  [ ] NO    All labs, radiologic studies, vitals, orders and medications list reviewed. Patient is seen and examined at bedside. Case discussed with medical team.

## 2024-06-11 NOTE — SWALLOW BEDSIDE ASSESSMENT ADULT - COMMENTS
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 Patient recently admitted for COVID PNA with hospital course complicated by encephalopathy and prolonged intubation requiring tracheostomy and PEG placement. Hospital course also complicated by mesenteric ischemia necessitating above SMA stent and cholecystitis necessitating cholecystostomy (patient was a poor surgical candidate). He was eventually discharged to [place]. While there had tracheostomy decannulated, but still with PEG. Per patient's son's recently had cholecystostomy exchanged on 6/5 and since then has noted bloody output. No change in output and he denies fever, chills or pain. He is at baseline health. Patient with CT A&P showing cholecystostomy in place. Patient himself without complaints, though limited by cognitive impairment."    6/7 CT Angio Chest: IMPRESSION: Suboptimal evaluation of the peripheral pulmonary arteries, especially at the lung bases. No central pulmonary embolus or secondary signs of right heart strain. Small to moderate right and small left pleural effusion, decreased since 1/16/2024. Recommend clinical correlation to assess underlying infection. Cholecystostomy and percutaneous gastrostomy tube in place.    Of Note: Patient known to speech dept at [facility] from prior admissions during cinesophagram was completed on 1/8 and 1/19, both of which recommended Puree diet/Moderately Thick liquids (see notes for details).     Patient seen at bedside with son present, understands and follows commands in English, native language is Lao. Patient/son declined language line  service. Pt's son reports pt was decannulated at rehab ~2 months ago, no PO diet since prior to trach/PEG placement. Pt received sleeping, easily wakened, required encouragement to consume PO trials offered, however expresses he wants to start eating again when asked. Per chart review - patient status post rapid response overnight "Pt currently on tube feeds. RRT called for concern for aspiration." (see summary note for details)     6/7 CT Angio Chest: IMPRESSION: Suboptimal evaluation of the peripheral pulmonary arteries, especially at the lung bases. No central pulmonary embolus or secondary signs of right heart strain. Small to moderate right and small left pleural effusion, decreased since 1/16/2024. Recommend clinical correlation to assess underlying infection. Cholecystostomy and percutaneous gastrostomy tube in place.     Of Note: Patient known to speech dept at [facility] from prior admissions during cinesophagram was completed on 1/8 and 1/19, both of which recommended Puree diet/Moderately Thick liquids (see notes for details).     Patient seen at bedside with son present, understands and follows commands in English, native language is Setswana. Patient/son declined language line  service. Pt's son reports pt was decannulated at rehab ~2 months ago, no PO diet since prior to trach/PEG placement. Pt received sleeping, easily wakened, required encouragement to consume PO trials offered, however expresses he wants to start eating again when asked.

## 2024-06-12 LAB
ANION GAP SERPL CALC-SCNC: 14 MMOL/L — SIGNIFICANT CHANGE UP (ref 7–14)
BUN SERPL-MCNC: 98 MG/DL — HIGH (ref 7–23)
CALCIUM SERPL-MCNC: 9.4 MG/DL — SIGNIFICANT CHANGE UP (ref 8.4–10.5)
CHLORIDE SERPL-SCNC: 99 MMOL/L — SIGNIFICANT CHANGE UP (ref 98–107)
CO2 SERPL-SCNC: 31 MMOL/L — SIGNIFICANT CHANGE UP (ref 22–31)
CREAT SERPL-MCNC: 2.74 MG/DL — HIGH (ref 0.5–1.3)
EGFR: 21 ML/MIN/1.73M2 — LOW
GLUCOSE BLDC GLUCOMTR-MCNC: 125 MG/DL — HIGH (ref 70–99)
GLUCOSE BLDC GLUCOMTR-MCNC: 156 MG/DL — HIGH (ref 70–99)
GLUCOSE BLDC GLUCOMTR-MCNC: 190 MG/DL — HIGH (ref 70–99)
GLUCOSE BLDC GLUCOMTR-MCNC: 207 MG/DL — HIGH (ref 70–99)
GLUCOSE SERPL-MCNC: 135 MG/DL — HIGH (ref 70–99)
HCT VFR BLD CALC: 30.7 % — LOW (ref 39–50)
HGB BLD-MCNC: 10.1 G/DL — LOW (ref 13–17)
MAGNESIUM SERPL-MCNC: 3 MG/DL — HIGH (ref 1.6–2.6)
MCHC RBC-ENTMCNC: 31 PG — SIGNIFICANT CHANGE UP (ref 27–34)
MCHC RBC-ENTMCNC: 32.9 GM/DL — SIGNIFICANT CHANGE UP (ref 32–36)
MCV RBC AUTO: 94.2 FL — SIGNIFICANT CHANGE UP (ref 80–100)
MRSA PCR RESULT.: DETECTED
NRBC # BLD: 0 /100 WBCS — SIGNIFICANT CHANGE UP (ref 0–0)
NRBC # FLD: 0 K/UL — SIGNIFICANT CHANGE UP (ref 0–0)
PHOSPHATE SERPL-MCNC: 5.9 MG/DL — HIGH (ref 2.5–4.5)
PLATELET # BLD AUTO: 295 K/UL — SIGNIFICANT CHANGE UP (ref 150–400)
POTASSIUM SERPL-MCNC: 3.9 MMOL/L — SIGNIFICANT CHANGE UP (ref 3.5–5.3)
POTASSIUM SERPL-SCNC: 3.9 MMOL/L — SIGNIFICANT CHANGE UP (ref 3.5–5.3)
RBC # BLD: 3.26 M/UL — LOW (ref 4.2–5.8)
RBC # FLD: 18.9 % — HIGH (ref 10.3–14.5)
S AUREUS DNA NOSE QL NAA+PROBE: DETECTED
SODIUM SERPL-SCNC: 144 MMOL/L — SIGNIFICANT CHANGE UP (ref 135–145)
WBC # BLD: 10.96 K/UL — HIGH (ref 3.8–10.5)
WBC # FLD AUTO: 10.96 K/UL — HIGH (ref 3.8–10.5)

## 2024-06-12 PROCEDURE — 74230 X-RAY XM SWLNG FUNCJ C+: CPT | Mod: 26

## 2024-06-12 RX ORDER — MUPIROCIN 20 MG/G
1 OINTMENT TOPICAL
Refills: 0 | Status: DISCONTINUED | OUTPATIENT
Start: 2024-06-12 | End: 2024-06-12

## 2024-06-12 RX ORDER — MUPIROCIN 20 MG/G
1 OINTMENT TOPICAL
Refills: 0 | Status: DISCONTINUED | OUTPATIENT
Start: 2024-06-12 | End: 2024-06-14

## 2024-06-12 RX ADMIN — ESCITALOPRAM OXALATE 10 MILLIGRAM(S): 10 TABLET, FILM COATED ORAL at 13:27

## 2024-06-12 RX ADMIN — Medication 1 DROP(S): at 11:31

## 2024-06-12 RX ADMIN — TICAGRELOR 60 MILLIGRAM(S): 90 TABLET ORAL at 17:57

## 2024-06-12 RX ADMIN — NYSTATIN CREAM 1 APPLICATION(S): 100000 CREAM TOPICAL at 05:02

## 2024-06-12 RX ADMIN — Medication 40 MILLIGRAM(S): at 05:01

## 2024-06-12 RX ADMIN — CHLORHEXIDINE GLUCONATE 1 APPLICATION(S): 213 SOLUTION TOPICAL at 13:28

## 2024-06-12 RX ADMIN — PANTOPRAZOLE SODIUM 40 MILLIGRAM(S): 20 TABLET, DELAYED RELEASE ORAL at 13:28

## 2024-06-12 RX ADMIN — TICAGRELOR 60 MILLIGRAM(S): 90 TABLET ORAL at 05:02

## 2024-06-12 RX ADMIN — CARVEDILOL PHOSPHATE 6.25 MILLIGRAM(S): 80 CAPSULE, EXTENDED RELEASE ORAL at 17:58

## 2024-06-12 RX ADMIN — Medication 20 MILLIGRAM(S): at 22:21

## 2024-06-12 RX ADMIN — Medication 1 DROP(S): at 05:01

## 2024-06-12 RX ADMIN — Medication 1 DROP(S): at 17:56

## 2024-06-12 RX ADMIN — Medication 81 MILLIGRAM(S): at 13:27

## 2024-06-12 RX ADMIN — NYSTATIN CREAM 1 APPLICATION(S): 100000 CREAM TOPICAL at 17:55

## 2024-06-12 RX ADMIN — Medication 2: at 23:50

## 2024-06-12 RX ADMIN — Medication 5 DROP(S): at 17:56

## 2024-06-12 RX ADMIN — CARVEDILOL PHOSPHATE 6.25 MILLIGRAM(S): 80 CAPSULE, EXTENDED RELEASE ORAL at 05:02

## 2024-06-12 RX ADMIN — MUPIROCIN 1 APPLICATION(S): 20 OINTMENT TOPICAL at 05:27

## 2024-06-12 RX ADMIN — Medication 250 MILLIGRAM(S): at 05:02

## 2024-06-12 RX ADMIN — INSULIN GLARGINE 12 UNIT(S): 100 INJECTION, SOLUTION SUBCUTANEOUS at 06:07

## 2024-06-12 RX ADMIN — Medication 1 GRAM(S): at 05:02

## 2024-06-12 RX ADMIN — Medication 1 DROP(S): at 23:50

## 2024-06-12 RX ADMIN — Medication 6 MILLIGRAM(S): at 22:22

## 2024-06-12 RX ADMIN — Medication 250 MILLIGRAM(S): at 22:21

## 2024-06-12 RX ADMIN — Medication 5 DROP(S): at 05:01

## 2024-06-12 RX ADMIN — MUPIROCIN 1 APPLICATION(S): 20 OINTMENT TOPICAL at 17:58

## 2024-06-12 RX ADMIN — Medication 1: at 06:07

## 2024-06-12 RX ADMIN — SODIUM CHLORIDE 4 MILLILITER(S): 9 INJECTION INTRAMUSCULAR; INTRAVENOUS; SUBCUTANEOUS at 21:05

## 2024-06-12 RX ADMIN — Medication 250 MILLIGRAM(S): at 13:27

## 2024-06-12 RX ADMIN — Medication 1 GRAM(S): at 17:57

## 2024-06-12 RX ADMIN — SENNA PLUS 10 MILLILITER(S): 8.6 TABLET ORAL at 22:21

## 2024-06-12 RX ADMIN — Medication 2 MILLIGRAM(S): at 22:22

## 2024-06-12 NOTE — SWALLOW VFSS/MBS ASSESSMENT ADULT - COMMENTS
***    Per chart review - patient status post rapid response overnight "Pt currently on tube feeds. RRT called for concern for aspiration." (see summary note for details)     6/7 CT Angio Chest: IMPRESSION: Suboptimal evaluation of the peripheral pulmonary arteries, especially at the lung bases. No central pulmonary embolus or secondary signs of right heart strain. Small to moderate right and small left pleural effusion, decreased since 1/16/2024. Recommend clinical correlation to assess underlying infection. Cholecystostomy and percutaneous gastrostomy tube in place.     Of Note: Patient known to speech dept at [facility] from prior admissions during cinesophagram was completed on 1/8 and 1/19, both of which recommended Puree diet/Moderately Thick liquids (see notes for details).     Of Note 2: Patient seen for bedside swallow evaluation yesterday with recommendation for continuation of NPO w/PEG feeds and a cinesophagram (see note for details).     Patient received in radiology, transferred to specialized seating unit for lateral viewing. Translation for Frisian used #[id number]. Patient with limited responses to questions and did not consistently follow commands. 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 RRT called for concern for aspiration." (see summary note for details)     6/7 CT Angio Chest: IMPRESSION: Suboptimal evaluation of the peripheral pulmonary arteries, especially at the lung bases. No central pulmonary embolus or secondary signs of right heart strain. Small to moderate right and small left pleural effusion, decreased since 1/16/2024. Recommend clinical correlation to assess underlying infection. Cholecystostomy and percutaneous gastrostomy tube in place.     Of Note: Patient known to speech dept at [facility] from prior admissions during cinesophagram was completed on 1/8 and 1/19, both of which recommended Puree diet/Moderately Thick liquids (see notes for details).     Of Note 2: Patient seen for bedside swallow evaluation yesterday with recommendation for continuation of NPO w/PEG feeds and a cinesophagram (see note for details).     Patient received in radiology, transferred to specialized seating unit for lateral viewing. Translation for Maori used #[id number]. Patient with limited responses to questions and did not consistently follow commands. 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 No central pulmonary embolus or secondary signs of right heart strain. Small to moderate right and small left pleural effusion, decreased since 1/16/2024. Recommend clinical correlation to assess underlying infection. Cholecystostomy and percutaneous gastrostomy tube in place.     Of Note: Patient known to speech dept at [facility] from prior admissions during cinesophagram was completed on 1/8 and 1/19, both of which recommended Puree diet/Moderately Thick liquids (see notes for details).     Of Note 2: Patient seen for bedside swallow evaluation yesterday with recommendation for continuation of NPO w/PEG feeds and a cinesophagram (see note for details).     Patient received in radiology, transferred to specialized seating unit for lateral viewing. Translation for Danish used #[id number]. Patient with limited responses to questions and did not consistently follow commands.

## 2024-06-12 NOTE — PROGRESS NOTE ADULT - SUBJECTIVE AND OBJECTIVE BOX
Date of Service  : 06-12-24     INTERVAL HPI/OVERNIGHT EVENTS: I feel fine.   Vital Signs Last 24 Hrs  T(C): 36.3 (12 Jun 2024 11:55), Max: 36.9 (11 Jun 2024 22:15)  T(F): 97.3 (12 Jun 2024 11:55), Max: 98.5 (11 Jun 2024 22:15)  HR: 83 (12 Jun 2024 11:55) (83 - 85)  BP: 126/69 (12 Jun 2024 11:55) (106/66 - 144/52)  BP(mean): --  RR: 18 (12 Jun 2024 11:55) (17 - 18)  SpO2: 98% (12 Jun 2024 11:55) (98% - 100%)    Parameters below as of 12 Jun 2024 11:55  Patient On (Oxygen Delivery Method): room air      I&O's Summary    11 Jun 2024 07:01  -  12 Jun 2024 07:00  --------------------------------------------------------  IN: 670 mL / OUT: 1200 mL / NET: -530 mL      MEDICATIONS  (STANDING):  artificial tears (preservative free) Ophthalmic Solution 1 Drop(s) Both EYES four times a day  aspirin  chewable 81 milliGRAM(s) Oral daily  carvedilol 6.25 milliGRAM(s) Oral every 12 hours  chlorhexidine 2% Cloths 1 Application(s) Topical daily  dextrose 10% Bolus 125 milliLiter(s) IV Bolus once  dextrose 5%. 1000 milliLiter(s) (50 mL/Hr) IV Continuous <Continuous>  dextrose 5%. 1000 milliLiter(s) (100 mL/Hr) IV Continuous <Continuous>  dextrose 50% Injectable 12.5 Gram(s) IV Push once  dextrose 50% Injectable 25 Gram(s) IV Push once  doxazosin 2 milliGRAM(s) Oral at bedtime  escitalopram Solution 10 milliGRAM(s) Oral daily  glucagon  Injectable 1 milliGRAM(s) IntraMuscular once  hydrocortisone/polymyxin/neomycin Suspension 5 Drop(s) Left Ear two times a day  insulin glargine Injectable (LANTUS) 12 Unit(s) SubCutaneous every morning  insulin lispro (ADMELOG) corrective regimen sliding scale   SubCutaneous every 6 hours  melatonin 6 milliGRAM(s) Oral at bedtime  mupirocin 2% Ointment 1 Application(s) Both Nostrils two times a day  nystatin Powder 1 Application(s) Topical two times a day  pantoprazole   Suspension 40 milliGRAM(s) Oral daily  polyethylene glycol 3350 17 Gram(s) Oral once  senna Syrup 10 milliLiter(s) Oral at bedtime  sodium chloride 3%  Inhalation 4 milliLiter(s) Inhalation every 8 hours  sucralfate suspension 1 Gram(s) Oral two times a day  ticagrelor 60 milliGRAM(s) Enteral Tube every 12 hours  torsemide 20 milliGRAM(s) Oral at bedtime  torsemide 40 milliGRAM(s) Oral daily  valproic  acid Syrup 250 milliGRAM(s) Oral three times a day    MEDICATIONS  (PRN):  acetaminophen   Oral Liquid .. 650 milliGRAM(s) Oral every 6 hours PRN Temp greater or equal to 38C (100.4F), Mild Pain (1 - 3)  albuterol/ipratropium for Nebulization 3 milliLiter(s) Nebulizer every 6 hours PRN Shortness of Breath and/or Wheezing  dextrose Oral Gel 15 Gram(s) Oral once PRN Blood Glucose LESS THAN 70 milliGRAM(s)/deciliter  oxymetazoline 0.05% Nasal Spray 1 Spray(s) Left Nostril two times a day PRN Bleeding from left ear    LABS:                        10.1   10.96 )-----------( 295      ( 12 Jun 2024 06:40 )             30.7     06-12    144  |  99  |  98<H>  ----------------------------<  135<H>  3.9   |  31  |  2.74<H>    Ca    9.4      12 Jun 2024 06:40  Phos  5.9     06-12  Mg     3.00     06-12    TPro  8.6<H>  /  Alb  3.3  /  TBili  0.2  /  DBili  x   /  AST  19  /  ALT  17  /  AlkPhos  189<H>  06-11      Urinalysis Basic - ( 12 Jun 2024 06:40 )    Color: x / Appearance: x / SG: x / pH: x  Gluc: 135 mg/dL / Ketone: x  / Bili: x / Urobili: x   Blood: x / Protein: x / Nitrite: x   Leuk Esterase: x / RBC: x / WBC x   Sq Epi: x / Non Sq Epi: x / Bacteria: x      CAPILLARY BLOOD GLUCOSE      POCT Blood Glucose.: 125 mg/dL (12 Jun 2024 12:29)  POCT Blood Glucose.: 156 mg/dL (12 Jun 2024 05:56)  POCT Blood Glucose.: 100 mg/dL (11 Jun 2024 23:41)  POCT Blood Glucose.: 101 mg/dL (11 Jun 2024 17:14)        Urinalysis Basic - ( 12 Jun 2024 06:40 )    Color: x / Appearance: x / SG: x / pH: x  Gluc: 135 mg/dL / Ketone: x  / Bili: x / Urobili: x   Blood: x / Protein: x / Nitrite: x   Leuk Esterase: x / RBC: x / WBC x   Sq Epi: x / Non Sq Epi: x / Bacteria: x      REVIEW OF SYSTEMS:  CONSTITUTIONAL: No fever, weight loss, or fatigue  EYES: No eye pain, visual disturbances, or discharge  ENMT:  No difficulty hearing, tinnitus, vertigo; No sinus or throat pain  NECK: No pain or stiffness  RESPIRATORY: No cough, wheezing, chills or hemoptysis; No shortness of breath  CARDIOVASCULAR: No chest pain, palpitations, dizziness, or leg swelling  GASTROINTESTINAL: No abdominal or epigastric pain. No nausea, vomiting, or hematemesis; No diarrhea or constipation. No melena or hematochezia.s      RADIOLOGY & ADDITIONAL TESTS:    Consultant(s) Notes Reviewed:  [x ] YES  [ ] NO    PHYSICAL EXAM:  GENERAL: Not in any distress ,  HEAD:  Atraumatic, Normocephalic  NECK: Supple, No JVD, Normal thyroid  NERVOUS SYSTEM:  Alert & No focal deficit   CHEST/LUNG: Good air entry bilateral with no  rales, rhonchi, wheezing, or rubs  HEART: Regular rate and rhythm; No murmurs, rubs, or gallops  ABDOMEN: Soft, Nontender, Nondistended; Bowel sounds present ,PEG &  Biliary drain. +  EXTREMITIES:  2+ Peripheral Pulses, No clubbing, cyanosis, or edema    Care Discussed with Consultants/Other Providers [ x] YES  [ ] NO

## 2024-06-12 NOTE — SWALLOW VFSS/MBS ASSESSMENT ADULT - RECOMMENDED CONSISTENCY
1. Continue NPO w/nonoral means of nutrition/hydration  2. Aspiration/Reflux Precautions  3. Oral Hygiene

## 2024-06-12 NOTE — PROGRESS NOTE ADULT - SUBJECTIVE AND OBJECTIVE BOX
Overnight events noted      VITAL:  T(C): , Max: 36.9 (06-11-24 @ 22:15)  T(F): , Max: 98.5 (06-11-24 @ 22:15)  HR: 85 (06-12-24 @ 05:00)  BP: 106/66 (06-12-24 @ 05:00)  BP(mean): --  RR: 17 (06-12-24 @ 05:00)  SpO2: 98% (06-12-24 @ 05:00)  Urine output 650cc/12h      PHYSICAL EXAM:  Constitutional: frail, lethargic but alert  HEENT: NCAT, DMM  Neck: Supple, No JVD  Respiratory: CTA-b/l  Cardiovascular: RRR s1s2, no m/r/g  Gastrointestinal: (+)cholecystostomy, (+)PEG  Extremities: No peripheral edema b/l  Neurological: (+)reduced generalized strength; (+)coarse RUE tremor  Back: no CVAT b/l  Skin: No rashes, no nevi      LABS:                        10.1   10.96 )-----------( 295      ( 12 Jun 2024 06:40 )             30.7     Na(144)/K(3.9)/Cl(99)/HCO3(31)/BUN(98)/Cr(2.74)Glu(135)/Ca(9.4)/Mg(3.00)/PO4(5.9)    06-12 @ 06:40  Na(143)/K(4.5)/Cl(98)/HCO3(28)/BUN(101)/Cr(2.48)Glu(209)/Ca(9.6)/Mg(3.10)/PO4(5.4)    06-11 @ 04:04  Na(141)/K(4.3)/Cl(97)/HCO3(28)/BUN(83)/Cr(1.95)Glu(198)/Ca(9.7)/Mg(--)/PO4(--)    06-10 @ 07:20  Na(138)/K(4.0)/Cl(96)/HCO3(28)/BUN(74)/Cr(1.57)Glu(209)/Ca(9.4)/Mg(--)/PO4(--)    06-09 @ 11:06      IMPRESSION: 89M w/ HTN, DM2, CAD-CABG, CVA, CKD3, recent COVID PNA c/b trach/decannulation and PEG placement, and recent cholecystitis/cholecystostomy; 6/8/24 from SNF with bloody cholecystostomy output; c/b MABLE    (1)CKD - stage 3b - unclear level of proteinuria - presumed due to DM/HTN  (2)MABLE - nonoliguric ATN from contrast nephropathy, renal parameters appear to be starting to plateau, such that dialysis will likely not be needed here  (3)Hyperphosphatemia - mild - can monitor for now    RECOMMEND:  (1)Torsemide as ordered, in effort to maintain nonoliguric status  (2)No further IV contrast for now; could plan for further IV contrast once MABLE resolves  (3)BMP+Mg+PO4 daily  (4)Strict I/Os please  (5)Dose new meds for GFR 15ml/min (present dosing is acceptable)            Davey Chacko MD  [facility]  Office/on call physician: [phone]  Cell (7a-7p): [phone]       no complaints      VITAL:  T(C): , Max: 36.9 (06-11-24 @ 22:15)  T(F): , Max: 98.5 (06-11-24 @ 22:15)  HR: 85 (06-12-24 @ 05:00)  BP: 106/66 (06-12-24 @ 05:00)  BP(mean): --  RR: 17 (06-12-24 @ 05:00)  SpO2: 98% (06-12-24 @ 05:00)  Urine output 650cc/12h      PHYSICAL EXAM:  Constitutional: frail, lethargic but alert  HEENT: NCAT, DMM  Neck: Supple, No JVD  Respiratory: CTA-b/l  Cardiovascular: RRR s1s2, no m/r/g  Gastrointestinal: (+)cholecystostomy, (+)PEG  Extremities: No peripheral edema b/l  Neurological: (+)reduced generalized strength; (+)coarse RUE tremor  Back: no CVAT b/l  Skin: No rashes, no nevi      LABS:                        10.1   10.96 )-----------( 295      ( 12 Jun 2024 06:40 )             30.7     Na(144)/K(3.9)/Cl(99)/HCO3(31)/BUN(98)/Cr(2.74)Glu(135)/Ca(9.4)/Mg(3.00)/PO4(5.9)    06-12 @ 06:40  Na(143)/K(4.5)/Cl(98)/HCO3(28)/BUN(101)/Cr(2.48)Glu(209)/Ca(9.6)/Mg(3.10)/PO4(5.4)    06-11 @ 04:04  Na(141)/K(4.3)/Cl(97)/HCO3(28)/BUN(83)/Cr(1.95)Glu(198)/Ca(9.7)/Mg(--)/PO4(--)    06-10 @ 07:20  Na(138)/K(4.0)/Cl(96)/HCO3(28)/BUN(74)/Cr(1.57)Glu(209)/Ca(9.4)/Mg(--)/PO4(--)    06-09 @ 11:06      IMPRESSION: 89M w/ HTN, DM2, CAD-CABG, CVA, CKD3, recent COVID PNA c/b trach/decannulation and PEG placement, and recent cholecystitis/cholecystostomy; 6/8/24 from SNF with bloody cholecystostomy output; c/b MABLE    (1)CKD - stage 3b - unclear level of proteinuria - presumed due to DM/HTN  (2)MABLE - nonoliguric ATN from contrast nephropathy, renal parameters appear to be starting to plateau, such that dialysis will likely not be needed here  (3)Hyperphosphatemia - mild - can monitor for now    RECOMMEND:  (1)Torsemide as ordered, in effort to maintain nonoliguric status  (2)No further IV contrast for now; could plan for further IV contrast once MABLE resolves  (3)BMP+Mg+PO4 daily  (4)Strict I/Os please  (5)Dose new meds for GFR 15ml/min (present dosing is acceptable)            Davey Chacko MD  [facility]  Office/on call physician: [phone]  Cell (7a-7p): [phone]

## 2024-06-12 NOTE — CHART NOTE - NSCHARTNOTEFT_GEN_A_CORE
Overnight Medicine ACP Coverage    Called to bedside by RN. Son/HCP requesting to for tube feeds to be restarted. Patient with no reported episodes of vomiting during day shift or overnight. Son would like tube feeds to be restarted overnight at a low rate. Son would like patient to have nutrition via tube feeds until 7am so patient will have energy to participate in C[place]. Son understands tube feeds were held as there were possible concerns for aspiration however remains adamant on restarting tube feeds given results of CXR negative for aspiration and negative infectious work at this time.      Overnight will reinitiate tube feeds at a low rate of 10cc/hr with titration of 10cc every hour with a goal rate of 30ccs until 7am. Will monitor patients tolerance to reinitiating tube feeds overnight. Son in agreement with plan.     Rangel Perez PA-C  Department of Medicine  [facility]  [id number]

## 2024-06-12 NOTE — SWALLOW VFSS/MBS ASSESSMENT ADULT - DIAGNOSTIC IMPRESSIONS
Patient striped puree bolus from spoon, and shortly after spit bolus out. Patient refused remaining trials/consistencies despite encouragement/education. Discussed with son in radiology and provided information for outpatient re-evaluation.

## 2024-06-13 LAB
ANION GAP SERPL CALC-SCNC: 12 MMOL/L — SIGNIFICANT CHANGE UP (ref 7–14)
BASOPHILS # BLD AUTO: 0.06 K/UL — SIGNIFICANT CHANGE UP (ref 0–0.2)
BASOPHILS NFR BLD AUTO: 0.7 % — SIGNIFICANT CHANGE UP (ref 0–2)
BUN SERPL-MCNC: 96 MG/DL — HIGH (ref 7–23)
CALCIUM SERPL-MCNC: 9.4 MG/DL — SIGNIFICANT CHANGE UP (ref 8.4–10.5)
CHLORIDE SERPL-SCNC: 100 MMOL/L — SIGNIFICANT CHANGE UP (ref 98–107)
CO2 SERPL-SCNC: 31 MMOL/L — SIGNIFICANT CHANGE UP (ref 22–31)
CREAT SERPL-MCNC: 2.55 MG/DL — HIGH (ref 0.5–1.3)
EGFR: 23 ML/MIN/1.73M2 — LOW
EOSINOPHIL # BLD AUTO: 0.42 K/UL — SIGNIFICANT CHANGE UP (ref 0–0.5)
EOSINOPHIL NFR BLD AUTO: 4.6 % — SIGNIFICANT CHANGE UP (ref 0–6)
GLUCOSE BLDC GLUCOMTR-MCNC: 143 MG/DL — HIGH (ref 70–99)
GLUCOSE BLDC GLUCOMTR-MCNC: 218 MG/DL — HIGH (ref 70–99)
GLUCOSE BLDC GLUCOMTR-MCNC: 245 MG/DL — HIGH (ref 70–99)
GLUCOSE SERPL-MCNC: 200 MG/DL — HIGH (ref 70–99)
HCT VFR BLD CALC: 31.5 % — LOW (ref 39–50)
HGB BLD-MCNC: 10.2 G/DL — LOW (ref 13–17)
IANC: 5.6 K/UL — SIGNIFICANT CHANGE UP (ref 1.8–7.4)
IMM GRANULOCYTES NFR BLD AUTO: 0.6 % — SIGNIFICANT CHANGE UP (ref 0–0.9)
LYMPHOCYTES # BLD AUTO: 2.27 K/UL — SIGNIFICANT CHANGE UP (ref 1–3.3)
LYMPHOCYTES # BLD AUTO: 25 % — SIGNIFICANT CHANGE UP (ref 13–44)
MAGNESIUM SERPL-MCNC: 3 MG/DL — HIGH (ref 1.6–2.6)
MCHC RBC-ENTMCNC: 30.4 PG — SIGNIFICANT CHANGE UP (ref 27–34)
MCHC RBC-ENTMCNC: 32.4 GM/DL — SIGNIFICANT CHANGE UP (ref 32–36)
MCV RBC AUTO: 94 FL — SIGNIFICANT CHANGE UP (ref 80–100)
MONOCYTES # BLD AUTO: 0.69 K/UL — SIGNIFICANT CHANGE UP (ref 0–0.9)
MONOCYTES NFR BLD AUTO: 7.6 % — SIGNIFICANT CHANGE UP (ref 2–14)
NEUTROPHILS # BLD AUTO: 5.6 K/UL — SIGNIFICANT CHANGE UP (ref 1.8–7.4)
NEUTROPHILS NFR BLD AUTO: 61.5 % — SIGNIFICANT CHANGE UP (ref 43–77)
NRBC # BLD: 0 /100 WBCS — SIGNIFICANT CHANGE UP (ref 0–0)
NRBC # FLD: 0 K/UL — SIGNIFICANT CHANGE UP (ref 0–0)
PHOSPHATE SERPL-MCNC: 4.8 MG/DL — HIGH (ref 2.5–4.5)
PLATELET # BLD AUTO: 306 K/UL — SIGNIFICANT CHANGE UP (ref 150–400)
POTASSIUM SERPL-MCNC: 3.9 MMOL/L — SIGNIFICANT CHANGE UP (ref 3.5–5.3)
POTASSIUM SERPL-SCNC: 3.9 MMOL/L — SIGNIFICANT CHANGE UP (ref 3.5–5.3)
RBC # BLD: 3.35 M/UL — LOW (ref 4.2–5.8)
RBC # FLD: 18.5 % — HIGH (ref 10.3–14.5)
SODIUM SERPL-SCNC: 143 MMOL/L — SIGNIFICANT CHANGE UP (ref 135–145)
WBC # BLD: 9.09 K/UL — SIGNIFICANT CHANGE UP (ref 3.8–10.5)
WBC # FLD AUTO: 9.09 K/UL — SIGNIFICANT CHANGE UP (ref 3.8–10.5)

## 2024-06-13 RX ADMIN — CARVEDILOL PHOSPHATE 6.25 MILLIGRAM(S): 80 CAPSULE, EXTENDED RELEASE ORAL at 18:07

## 2024-06-13 RX ADMIN — NYSTATIN CREAM 1 APPLICATION(S): 100000 CREAM TOPICAL at 05:54

## 2024-06-13 RX ADMIN — Medication 20 MILLIGRAM(S): at 22:46

## 2024-06-13 RX ADMIN — Medication 250 MILLIGRAM(S): at 14:43

## 2024-06-13 RX ADMIN — Medication 5 DROP(S): at 18:08

## 2024-06-13 RX ADMIN — Medication 5 DROP(S): at 05:03

## 2024-06-13 RX ADMIN — Medication 250 MILLIGRAM(S): at 05:03

## 2024-06-13 RX ADMIN — Medication 1 GRAM(S): at 05:02

## 2024-06-13 RX ADMIN — Medication 2: at 06:10

## 2024-06-13 RX ADMIN — MUPIROCIN 1 APPLICATION(S): 20 OINTMENT TOPICAL at 05:03

## 2024-06-13 RX ADMIN — Medication 1 DROP(S): at 22:48

## 2024-06-13 RX ADMIN — TICAGRELOR 60 MILLIGRAM(S): 90 TABLET ORAL at 18:08

## 2024-06-13 RX ADMIN — PANTOPRAZOLE SODIUM 40 MILLIGRAM(S): 20 TABLET, DELAYED RELEASE ORAL at 14:42

## 2024-06-13 RX ADMIN — SENNA PLUS 10 MILLILITER(S): 8.6 TABLET ORAL at 22:46

## 2024-06-13 RX ADMIN — TICAGRELOR 60 MILLIGRAM(S): 90 TABLET ORAL at 05:02

## 2024-06-13 RX ADMIN — Medication 1 DROP(S): at 05:03

## 2024-06-13 RX ADMIN — CARVEDILOL PHOSPHATE 6.25 MILLIGRAM(S): 80 CAPSULE, EXTENDED RELEASE ORAL at 05:03

## 2024-06-13 RX ADMIN — Medication 2 MILLIGRAM(S): at 22:47

## 2024-06-13 RX ADMIN — Medication 1 DROP(S): at 18:07

## 2024-06-13 RX ADMIN — ESCITALOPRAM OXALATE 10 MILLIGRAM(S): 10 TABLET, FILM COATED ORAL at 14:42

## 2024-06-13 RX ADMIN — Medication 81 MILLIGRAM(S): at 14:41

## 2024-06-13 RX ADMIN — Medication 250 MILLIGRAM(S): at 22:47

## 2024-06-13 RX ADMIN — CHLORHEXIDINE GLUCONATE 1 APPLICATION(S): 213 SOLUTION TOPICAL at 14:43

## 2024-06-13 RX ADMIN — Medication 6 MILLIGRAM(S): at 22:47

## 2024-06-13 RX ADMIN — SODIUM CHLORIDE 4 MILLILITER(S): 9 INJECTION INTRAMUSCULAR; INTRAVENOUS; SUBCUTANEOUS at 10:18

## 2024-06-13 RX ADMIN — NYSTATIN CREAM 1 APPLICATION(S): 100000 CREAM TOPICAL at 18:07

## 2024-06-13 RX ADMIN — Medication 1 DROP(S): at 14:41

## 2024-06-13 RX ADMIN — Medication 40 MILLIGRAM(S): at 05:03

## 2024-06-13 RX ADMIN — Medication 1 GRAM(S): at 18:08

## 2024-06-13 RX ADMIN — INSULIN GLARGINE 12 UNIT(S): 100 INJECTION, SOLUTION SUBCUTANEOUS at 06:10

## 2024-06-13 RX ADMIN — Medication 2: at 18:51

## 2024-06-13 RX ADMIN — MUPIROCIN 1 APPLICATION(S): 20 OINTMENT TOPICAL at 18:08

## 2024-06-13 NOTE — PROGRESS NOTE ADULT - SUBJECTIVE AND OBJECTIVE BOX
Date of Service  : 06-13-24     INTERVAL HPI/OVERNIGHT EVENTS: I feel fine.   Vital Signs Last 24 Hrs  T(C): 36.3 (13 Jun 2024 05:00), Max: 36.7 (12 Jun 2024 22:26)  T(F): 97.4 (13 Jun 2024 05:00), Max: 98.1 (12 Jun 2024 22:26)  HR: 84 (13 Jun 2024 10:21) (80 - 84)  BP: 138/74 (13 Jun 2024 05:00) (126/69 - 138/74)  BP(mean): --  RR: 17 (13 Jun 2024 05:00) (17 - 18)  SpO2: 100% (13 Jun 2024 10:21) (98% - 100%)    Parameters below as of 13 Jun 2024 10:21  Patient On (Oxygen Delivery Method): room air      I&O's Summary    12 Jun 2024 07:01  -  13 Jun 2024 07:00  --------------------------------------------------------  IN: 480 mL / OUT: 950 mL / NET: -470 mL      MEDICATIONS  (STANDING):  artificial tears (preservative free) Ophthalmic Solution 1 Drop(s) Both EYES four times a day  aspirin  chewable 81 milliGRAM(s) Oral daily  carvedilol 6.25 milliGRAM(s) Oral every 12 hours  chlorhexidine 2% Cloths 1 Application(s) Topical daily  dextrose 10% Bolus 125 milliLiter(s) IV Bolus once  dextrose 5%. 1000 milliLiter(s) (100 mL/Hr) IV Continuous <Continuous>  dextrose 5%. 1000 milliLiter(s) (50 mL/Hr) IV Continuous <Continuous>  dextrose 50% Injectable 25 Gram(s) IV Push once  dextrose 50% Injectable 12.5 Gram(s) IV Push once  doxazosin 2 milliGRAM(s) Oral at bedtime  escitalopram Solution 10 milliGRAM(s) Oral daily  glucagon  Injectable 1 milliGRAM(s) IntraMuscular once  hydrocortisone/polymyxin/neomycin Suspension 5 Drop(s) Left Ear two times a day  insulin glargine Injectable (LANTUS) 12 Unit(s) SubCutaneous every morning  insulin lispro (ADMELOG) corrective regimen sliding scale   SubCutaneous every 6 hours  melatonin 6 milliGRAM(s) Oral at bedtime  mupirocin 2% Ointment 1 Application(s) Both Nostrils two times a day  nystatin Powder 1 Application(s) Topical two times a day  pantoprazole   Suspension 40 milliGRAM(s) Oral daily  polyethylene glycol 3350 17 Gram(s) Oral once  senna Syrup 10 milliLiter(s) Oral at bedtime  sodium chloride 3%  Inhalation 4 milliLiter(s) Inhalation every 8 hours  sucralfate suspension 1 Gram(s) Oral two times a day  ticagrelor 60 milliGRAM(s) Enteral Tube every 12 hours  torsemide 40 milliGRAM(s) Oral daily  torsemide 20 milliGRAM(s) Oral at bedtime  valproic  acid Syrup 250 milliGRAM(s) Oral three times a day    MEDICATIONS  (PRN):  acetaminophen   Oral Liquid .. 650 milliGRAM(s) Oral every 6 hours PRN Temp greater or equal to 38C (100.4F), Mild Pain (1 - 3)  albuterol/ipratropium for Nebulization 3 milliLiter(s) Nebulizer every 6 hours PRN Shortness of Breath and/or Wheezing  dextrose Oral Gel 15 Gram(s) Oral once PRN Blood Glucose LESS THAN 70 milliGRAM(s)/deciliter  oxymetazoline 0.05% Nasal Spray 1 Spray(s) Left Nostril two times a day PRN Bleeding from left ear    LABS:                        10.2   9.09  )-----------( 306      ( 13 Jun 2024 06:00 )             31.5     06-13    143  |  100  |  96<H>  ----------------------------<  200<H>  3.9   |  31  |  2.55<H>    Ca    9.4      13 Jun 2024 06:00  Phos  4.8     06-13  Mg     3.00     06-13        Urinalysis Basic - ( 13 Jun 2024 06:00 )    Color: x / Appearance: x / SG: x / pH: x  Gluc: 200 mg/dL / Ketone: x  / Bili: x / Urobili: x   Blood: x / Protein: x / Nitrite: x   Leuk Esterase: x / RBC: x / WBC x   Sq Epi: x / Non Sq Epi: x / Bacteria: x      CAPILLARY BLOOD GLUCOSE      POCT Blood Glucose.: 218 mg/dL (13 Jun 2024 05:57)  POCT Blood Glucose.: 207 mg/dL (12 Jun 2024 23:47)  POCT Blood Glucose.: 190 mg/dL (12 Jun 2024 18:20)  POCT Blood Glucose.: 125 mg/dL (12 Jun 2024 12:29)        Urinalysis Basic - ( 13 Jun 2024 06:00 )    Color: x / Appearance: x / SG: x / pH: x  Gluc: 200 mg/dL / Ketone: x  / Bili: x / Urobili: x   Blood: x / Protein: x / Nitrite: x   Leuk Esterase: x / RBC: x / WBC x   Sq Epi: x / Non Sq Epi: x / Bacteria: x          RADIOLOGY & ADDITIONAL TESTS:    Consultant(s) Notes Reviewed:  [x ] YES  [ ] NO    PHYSICAL EXAM:  GENERAL: Not in any distress ,  HEAD:  Atraumatic, Normocephalic  NECK: Supple, No JVD, Normal thyroid  NERVOUS SYSTEM:  Alert & No focal deficit   CHEST/LUNG: Good air entry bilateral with no  rales, rhonchi, wheezing, or rubs  HEART: Regular rate and rhythm; No murmurs, rubs, or gallops  ABDOMEN: Soft, Nontender, Nondistended; Bowel sounds present FT & Cholecystomy tube.  EXTREMITIES:   No clubbing, cyanosis, or edema    Care Discussed with Consultants/Other Providers [ x] YES  [ ] NO

## 2024-06-13 NOTE — PROGRESS NOTE ADULT - ASSESSMENT
89M with history of CAD s/p CABG s/p stents (last stent May 2022), s/p PPM, DM2, CKD (baseline Cr 1.2-1.3 as per family), PVD, HTN, HLD, CVA x3 presents wit    EKG SR RBBB (old per family)     1) Bleeding in [place] tube   - H/H stable   - can stop Brilinta if ok with vascular SMA stent in 12.23     2) CAD s/p CABG/LV dysfunction   - c/w asa lipitor     3) Afib  -hx of PAF  -no AC 2/2 GIB  -on coreg 6.25mg BID    4) LV dysfunction   - euvolemic on exam 
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         Problem/Plan - 1:  ·  Problem: Cholecystostomy care.   ·  Plan: In setting or recent exchange by IR on 6/5.   - Bleeding has resolved.   - IR following.   - Possibly bloody output is self-limiting. Hg is stable. If it continues would look into whether it can be removed (question whether he is a candidate for cholecystectomy at this time?). Patient also on DAPT with recent SMA placement in 12/24. Question whether one anti-platelet can be stopped (re: vascular cardiology consult)  - Monitor output.    < from: CT Abdomen and Pelvis w/ IV Cont (06.08.24 @ 23:57) >  IMPRESSION:    Suboptimal evaluation of the peripheral pulmonary arteries, especially at   the lung bases. No central pulmonary embolus or secondary signs of right   heart strain.    Small to moderate right and small left pleural effusion, decreased since   1/16/2024. Recommend clinical correlation to assess underlying infection.  Additional findings as described.     Problem/Plan - 2:  ·  Problem: CAD (coronary artery disease).   ·  Plan: - Has low level troponinemia in the 100s (seems baseline)  - Continue with DAPT  - Continue with Coreg  - Cardiology help appreciated.      Problem/Plan - 3:  ·  Problem: Chronic systolic heart failure.   ·  Plan: - Continue with Torsemide 40 mg AM and 20 mg PM  - Maintain euvolemia.     Problem/Plan - 4:  ·  Problem: Superior mesenteric artery stenosis.   ·  Plan: - Stent placed in 12/24  - Currently on Aspirin and Brilinta  - Consider vascular if further guidance needed.     Problem/Plan - 5:  ·  Problem: Hypertension.   ·  Plan: - As above.     Problem/Plan - 6:  ·  Problem: Diabetes mellitus.   ·  Plan: - At NH patient is on lantus 17 units daily and insulin sliding scale  - Provide reduce basal insulin here  - FS q6 hours and insulin sliding scale.     Problem/Plan - 7:  ·  Problem: Stage 3 chronic kidney disease with MABLE secondary CIATN.   ·  Plan: - Creatinine trending up.  - Renal help appreciated.      Problem/Plan - 8:  ·  Problem: Cognitive impairment.   ·  Plan: - Had MCI prior to COVID infection  - Currently on Valproic Acid for mood stabilization  - Maintain diurnal cycle  - Also with functional quadriplegia --> PT consult and wound care consult (possible decubitus ulcer)     Problem/Plan - 9:  ·  Problem: Hoarseness Voice with hard of hearing .   ·  Plan: - ENT help appreciated.     Problem/Plan - 10:  ·  Problem: Dysphagia  .   ·  Plan: - S/S evaluation done .    Cineesophagogram pending.    Dispo : DC planning improvement in creatinine.        
89M with history of CAD s/p CABG s/p stents (last stent May 2022), s/p PPM, DM2, CKD (baseline Cr 1.2-1.3 as per family), PVD, HTN, HLD, CVA x3 presents wit    EKG SR RBBB (old per family)     1) Bleeding in [place] tube   - H/H stable   - can stop Brilinta if ok with vascular SMA stent in 12.23     2) CAD s/p CABG/LV dysfunction   - c/w asa lipitor     3) Afib  -hx of PAF  -no AC 2/2 GIB  -on coreg 6.25mg BID    4) LV dysfunction   - euvolemic on exam 
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         Problem/Plan - 1:  ·  Problem: Cholecystostomy care.   ·  Plan: In setting or recent exchange by IR on 6/5.     - IR follow up pending.   - Possibly bloody output is self-limiting. Hg is stable. If it continues would look into whether it can be removed (question whether he is a candidate for cholecystectomy at this time?). Patient also on DAPT with recent SMA placement in 12/24. Question whether one anti-platelet can be stopped (re: vascular cardiology consult)  - Monitor output.    < from: CT Abdomen and Pelvis w/ IV Cont (06.08.24 @ 23:57) >  IMPRESSION:    Suboptimal evaluation of the peripheral pulmonary arteries, especially at   the lung bases. No central pulmonary embolus or secondary signs of right   heart strain.    Small to moderate right and small left pleural effusion, decreased since   1/16/2024. Recommend clinical correlation to assess underlying infection.    Cholecystostomy and percutaneous gastrostomy tube in place.    Additional findings as described.    < end of copied text >   Problem/Plan - 2:  ·  Problem: CAD (coronary artery disease).   ·  Plan: - Has low level troponinemia in the 100s (seems baseline)  - Continue with DAPT  - Continue with Coreg  - Cardiology help appreciated.      Problem/Plan - 3:  ·  Problem: Chronic systolic heart failure.   ·  Plan: - Continue with Torsemide 40 mg AM and 20 mg PM  - Maintain euvolemia.     Problem/Plan - 4:  ·  Problem: Superior mesenteric artery stenosis.   ·  Plan: - Stent placed in 12/24  - Currently on Aspirin and Brilinta  - Consider vascular if further guidance needed.     Problem/Plan - 5:  ·  Problem: Hypertension.   ·  Plan: - As above.     Problem/Plan - 6:  ·  Problem: Diabetes mellitus.   ·  Plan: - At NH patient is on lantus 17 units daily and insulin sliding scale  - Provide reduce basal insulin here  - FS q6 hours and insulin sliding scale.     Problem/Plan - 7:  ·  Problem: Stage 3 chronic kidney disease with MABLE secondary CIATN.   ·  Plan: - Baseline Cr ~1.5  - Renal help appreciated.      Problem/Plan - 8:  ·  Problem: Cognitive impairment.   ·  Plan: - Had MCI prior to COVID infection  - Currently on Valproic Acid for mood stabilization  - Maintain diurnal cycle  - Also with functional quadriplegia --> PT consult and wound care consult (possible decubitus ulcer)     Problem/Plan - 9:  ·  Problem: Hoarseness Voice with hard of hearing .   ·  Plan: - ENT consult pending.   Son requesting S&S. Recently decannulated trach, but still with PEG.    D/W patient's family and ACP Diane  
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         Problem/Plan - 1:  ·  Problem: Cholecystostomy care.   ·  Plan: In setting or recent exchange by IR on 6/5.   - Bleeding has resolved.   - IR following.   - Possibly bloody output is self-limiting. Hg is stable. If it continues would look into whether it can be removed (question whether he is a candidate for cholecystectomy at this time?). Patient also on DAPT with recent SMA placement in 12/24. Question whether one anti-platelet can be stopped (re: vascular cardiology consult)  - Monitor output.    < from: CT Abdomen and Pelvis w/ IV Cont (06.08.24 @ 23:57) >  IMPRESSION:    Suboptimal evaluation of the peripheral pulmonary arteries, especially at   the lung bases. No central pulmonary embolus or secondary signs of right   heart strain.    Small to moderate right and small left pleural effusion, decreased since   1/16/2024. Recommend clinical correlation to assess underlying infection.  Additional findings as described.     Problem/Plan - 2:  ·  Problem: CAD (coronary artery disease).   ·  Plan: - Has low level troponinemia in the 100s (seems baseline)  - Continue with DAPT  - Continue with Coreg  - Cardiology help appreciated.      Problem/Plan - 3:  ·  Problem: Chronic systolic heart failure.   ·  Plan: - Continue with Torsemide 40 mg AM and 20 mg PM  - Maintain euvolemia.     Problem/Plan - 4:  ·  Problem: Superior mesenteric artery stenosis.   ·  Plan: - Stent placed in 12/24  - Currently on Aspirin and Brilinta  - Consider vascular if further guidance needed.     Problem/Plan - 5:  ·  Problem: Hypertension.   ·  Plan: - As above.     Problem/Plan - 6:  ·  Problem: Diabetes mellitus.   ·  Plan: - At NH patient is on lantus 17 units daily and insulin sliding scale  - Provide reduce basal insulin here  - FS q6 hours and insulin sliding scale.     Problem/Plan - 7:  ·  Problem: Stage 3 chronic kidney disease with MABLE secondary CIATN.   ·  Plan: - Creatinine trending up.  - Renal help appreciated.      Problem/Plan - 8:  ·  Problem: Cognitive impairment.   ·  Plan: - Had MCI prior to COVID infection  - Currently on Valproic Acid for mood stabilization  - Maintain diurnal cycle  - Also with functional quadriplegia --> PT consult and wound care consult (possible decubitus ulcer)     Problem/Plan - 9:  ·  Problem: Hoarseness Voice with hard of hearing .   ·  Plan: - ENT help appreciated.     Problem/Plan - 10:  ·  Problem: Dysphagia  .   ·  Plan: - Continue TF.   S/S evaluation done .    Cineesophagogram failed.     Dispo : DC planning home as do not want to take him home. D/W patient's Sons .    
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         Problem/Plan - 1:  ·  Problem: Cholecystostomy care.   ·  Plan: In setting or recent exchange by IR on 6/5.   - Bleeding has resolved.   - IR following.   - Possibly bloody output is self-limiting. Hg is stable. If it continues would look into whether it can be removed (question whether he is a candidate for cholecystectomy at this time?). Patient also on DAPT with recent SMA placement in 12/24. Question whether one anti-platelet can be stopped (re: vascular cardiology consult)  - Monitor output.    < from: CT Abdomen and Pelvis w/ IV Cont (06.08.24 @ 23:57) >  IMPRESSION:    Suboptimal evaluation of the peripheral pulmonary arteries, especially at   the lung bases. No central pulmonary embolus or secondary signs of right   heart strain.    Small to moderate right and small left pleural effusion, decreased since   1/16/2024. Recommend clinical correlation to assess underlying infection.  Additional findings as described.     Problem/Plan - 2:  ·  Problem: CAD (coronary artery disease).   ·  Plan: - Has low level troponinemia in the 100s (seems baseline)  - Continue with DAPT  - Continue with Coreg  - Cardiology help appreciated.      Problem/Plan - 3:  ·  Problem: Chronic systolic heart failure.   ·  Plan: - Continue with Torsemide 40 mg AM and 20 mg PM  - Maintain euvolemia.     Problem/Plan - 4:  ·  Problem: Superior mesenteric artery stenosis.   ·  Plan: - Stent placed in 12/24  - Currently on Aspirin and Brilinta  - Consider vascular if further guidance needed.     Problem/Plan - 5:  ·  Problem: Hypertension.   ·  Plan: - As above.     Problem/Plan - 6:  ·  Problem: Diabetes mellitus.   ·  Plan: - At NH patient is on lantus 17 units daily and insulin sliding scale  - Provide reduce basal insulin here  - FS q6 hours and insulin sliding scale.     Problem/Plan - 7:  ·  Problem: Stage 3 chronic kidney disease with MABLE secondary CIATN.   ·  Plan: - Creatinine trending up.  - Renal help appreciated.      Problem/Plan - 8:  ·  Problem: Cognitive impairment.   ·  Plan: - Had MCI prior to COVID infection  - Currently on Valproic Acid for mood stabilization  - Maintain diurnal cycle  - Also with functional quadriplegia --> PT consult and wound care consult (possible decubitus ulcer)     Problem/Plan - 9:  ·  Problem: Hoarseness Voice with hard of hearing .   ·  Plan: - ENT help appreciated.     Problem/Plan - 10:  ·  Problem: Dysphagia  .   ·  Plan: - Continue TF.   S/S evaluation done .    Cineesophagogram failed.     Dispo : DC planning pending  improvement in creatinine.

## 2024-06-13 NOTE — CHART NOTE - NSCHARTNOTEFT_GEN_A_CORE
Source: Patient [ ]    Family [ ]     other [x ] RN, chart review    Current Diet : Diet, NPO with Tube Feed:   Tube Feeding Modality: Gastrostomy  Glucerna 1.5 [place] (GLUCERNA1.5RTH)  Total Volume for 24 Hours (mL): 1440  Continuous  Starting Tube Feed Rate {mL per Hour}: 30  Increase Tube Feed Rate by (mL): 10     Every hour  Until Goal Tube Feed Rate (mL per Hour): 60  Tube Feed Duration (in Hours): 24  Tube Feed Start Time: 00:00  Tube Feed Stop Time: 00:00  Pump   Rate (mL per Hour): 55   Frequency: Every Hour    Duration (Hours): 20    Start Time: 07:00 (06-13-24 @ 09:35) [Active]      Height (cm): 175.3 (09 Jun 2024 12:23)  Weight (kg): 68 (09 Jun 2024 12:23), no updated weight to assess  BMI (kg/m2): 22.1 (09 Jun 2024 12:23)    Nutrition Note:  89M with PMHx of CVA, CAD (post-CABG), 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    Patient is seen for nutrition follow-up. Tube feeding is running with Glucerna 1.5@ 50ml/hr (not at goal rate) during visit. Per RN, new tube feeding order started this morning, patient is tolerating TF so far, increasing TF towards goal rate, as tolerated and as ordered. No recent episode of any N/V/D/C reported at this time. No bowel movement documented on RN flow sheet. Noted patient is on bowel regimen. Per swallow eval dated 6/12, continue NPO with non-oral means of nutrition/hydration. Noted patient has a call for RRT on 6/11, TF was on hold and reinitiated on 6/12, with a low rate of 10cc/hr, to monitor TF tolerance. Current TF order: Glucerna 1.5@60ml/hr x 24hrs (1440ml, 2160kcal, 118.8gm pro, 1092.9ml free water from feed), meeting patient's estimated energy and protein needs. Free water flushes per MD discretion.      __________________ Pertinent Medications__________________   MEDICATIONS  (STANDING):  artificial tears (preservative free) Ophthalmic Solution 1 Drop(s) Both EYES four times a day  aspirin  chewable 81 milliGRAM(s) Oral daily  carvedilol 6.25 milliGRAM(s) Oral every 12 hours  chlorhexidine 2% Cloths 1 Application(s) Topical daily  dextrose 10% Bolus 125 milliLiter(s) IV Bolus once  dextrose 5%. 1000 milliLiter(s) (100 mL/Hr) IV Continuous <Continuous>  dextrose 5%. 1000 milliLiter(s) (50 mL/Hr) IV Continuous <Continuous>  dextrose 50% Injectable 25 Gram(s) IV Push once  dextrose 50% Injectable 12.5 Gram(s) IV Push once  doxazosin 2 milliGRAM(s) Oral at bedtime  escitalopram Solution 10 milliGRAM(s) Oral daily  glucagon  Injectable 1 milliGRAM(s) IntraMuscular once  hydrocortisone/polymyxin/neomycin Suspension 5 Drop(s) Left Ear two times a day  insulin glargine Injectable (LANTUS) 12 Unit(s) SubCutaneous every morning  insulin lispro (ADMELOG) corrective regimen sliding scale   SubCutaneous every 6 hours  melatonin 6 milliGRAM(s) Oral at bedtime  mupirocin 2% Ointment 1 Application(s) Both Nostrils two times a day  nystatin Powder 1 Application(s) Topical two times a day  pantoprazole   Suspension 40 milliGRAM(s) Oral daily  polyethylene glycol 3350 17 Gram(s) Oral once  senna Syrup 10 milliLiter(s) Oral at bedtime  sodium chloride 3%  Inhalation 4 milliLiter(s) Inhalation every 8 hours  sucralfate suspension 1 Gram(s) Oral two times a day  ticagrelor 60 milliGRAM(s) Enteral Tube every 12 hours  torsemide 40 milliGRAM(s) Oral daily  torsemide 20 milliGRAM(s) Oral at bedtime  valproic  acid Syrup 250 milliGRAM(s) Oral three times a day    MEDICATIONS  (PRN):  acetaminophen   Oral Liquid .. 650 milliGRAM(s) Oral every 6 hours PRN Temp greater or equal to 38C (100.4F), Mild Pain (1 - 3)  albuterol/ipratropium for Nebulization 3 milliLiter(s) Nebulizer every 6 hours PRN Shortness of Breath and/or Wheezing  dextrose Oral Gel 15 Gram(s) Oral once PRN Blood Glucose LESS THAN 70 milliGRAM(s)/deciliter  oxymetazoline 0.05% Nasal Spray 1 Spray(s) Left Nostril two times a day PRN Bleeding from left ear      __________________ Pertinent Labs__________________   06-13 Na143 mmol/L Glu 200 mg/dL<H> K+ 3.9 mmol/L Cr  2.55 mg/dL<H> BUN 96 mg/dL<H> 06-13 Phos 4.8 mg/dL<H> 06-11 Alb 3.3 g/dL  POCT Blood Glucose.: 143 mg/dL (13 Jun 2024 12:09)  POCT Blood Glucose.: 218 mg/dL (13 Jun 2024 05:57)  POCT Blood Glucose.: 207 mg/dL (12 Jun 2024 23:47)  POCT Blood Glucose.: 190 mg/dL (12 Jun 2024 18:20)      Skin: Per wound care note dated 6/10 , patient has stage II pressure injury to sacrum.     Estimated Needs:   [ x] no change since previous assessment  ABW- 149.9 lb/ 68 kg  Estimated energy needs: 30- 35kcal/kg/day= 2040-2380kcal/day   Estimated protein needs: 1.2-1.8gm/kg/day= 81.6-122.4gm /day    Previous Nutrition Diagnosis:   [x ] Severe malnutrition   Nutrition Diagnosis is [x ] ongoing   New Nutrition Diagnosis: [x ] not applicable    Education: [x ] Not applicable.     Interventions:   Recommend  [x ] Continue with current TF regimen: Glucerna 1.5@60ml/hr x 24hrs (1440ml, 2160kcal, 118.8gm pro, 1092.9ml free water from feed). Free water flushes per MD discretion.   [x ] Consider adding multivitamin, to promote wound healing.   [x ] Document bowel movement on RN flow sheet.   [x ] Monitor TF tolerance.      Monitoring and Evaluation:   [x ] Tolerance to diet prescription [x ] weights [ x] follow up per protocol  [x ] other: bowel movement, skin integrity, labs.

## 2024-06-13 NOTE — PROGRESS NOTE ADULT - SUBJECTIVE AND OBJECTIVE BOX
NEPHROLOGY-[facility] [phone]        Patient seen and examined no overnight events noted.         MEDICATIONS  (STANDING):  artificial tears (preservative free) Ophthalmic Solution 1 Drop(s) Both EYES four times a day  aspirin  chewable 81 milliGRAM(s) Oral daily  carvedilol 6.25 milliGRAM(s) Oral every 12 hours  chlorhexidine 2% Cloths 1 Application(s) Topical daily  dextrose 10% Bolus 125 milliLiter(s) IV Bolus once  dextrose 5%. 1000 milliLiter(s) (100 mL/Hr) IV Continuous <Continuous>  dextrose 5%. 1000 milliLiter(s) (50 mL/Hr) IV Continuous <Continuous>  dextrose 50% Injectable 25 Gram(s) IV Push once  dextrose 50% Injectable 12.5 Gram(s) IV Push once  doxazosin 2 milliGRAM(s) Oral at bedtime  escitalopram Solution 10 milliGRAM(s) Oral daily  glucagon  Injectable 1 milliGRAM(s) IntraMuscular once  hydrocortisone/polymyxin/neomycin Suspension 5 Drop(s) Left Ear two times a day  insulin glargine Injectable (LANTUS) 12 Unit(s) SubCutaneous every morning  insulin lispro (ADMELOG) corrective regimen sliding scale   SubCutaneous every 6 hours  melatonin 6 milliGRAM(s) Oral at bedtime  mupirocin 2% Ointment 1 Application(s) Both Nostrils two times a day  nystatin Powder 1 Application(s) Topical two times a day  pantoprazole   Suspension 40 milliGRAM(s) Oral daily  polyethylene glycol 3350 17 Gram(s) Oral once  senna Syrup 10 milliLiter(s) Oral at bedtime  sodium chloride 3%  Inhalation 4 milliLiter(s) Inhalation every 8 hours  sucralfate suspension 1 Gram(s) Oral two times a day  ticagrelor 60 milliGRAM(s) Enteral Tube every 12 hours  torsemide 40 milliGRAM(s) Oral daily  torsemide 20 milliGRAM(s) Oral at bedtime  valproic  acid Syrup 250 milliGRAM(s) Oral three times a day      VITAL:  T(C): , Max: 36.7 (06-12-24 @ 22:26)  T(F): , Max: 98.1 (06-12-24 @ 22:26)  HR: 87 (06-13-24 @ 12:00)  BP: 111/- (06-13-24 @ 12:00)  BP(mean): --  RR: 17 (06-13-24 @ 12:00)  SpO2: 97% (06-13-24 @ 12:00)  Wt(kg): --    I and O's:    06-12 @ 07:01  -  06-13 @ 07:00  --------------------------------------------------------  IN: 480 mL / OUT: 950 mL / NET: -470 mL          PHYSICAL EXAM:    Constitutional: frail, NAD  Neck:  No JVD  Respiratory: CTAB/L  Cardiovascular: S1 and S2  Gastrointestinal: + cholecystostomy, + PEG  Extremities: No peripheral edema  Neurological: reduced generalized strength + coarse RUE tremor   Psychiatric: Normal mood, normal affect  : No Moss  Skin: No rashes  Access: Not applicable    LABS:                        10.2   9.09  )-----------( 306      ( 13 Jun 2024 06:00 )             31.5     06-13    143  |  100  |  96<H>  ----------------------------<  200<H>  3.9   |  31  |  2.55<H>    Ca    9.4      13 Jun 2024 06:00  Phos  4.8     06-13  Mg     3.00     06-13            Urine Studies:  Urinalysis Basic - ( 13 Jun 2024 06:00 )    Color: x / Appearance: x / SG: x / pH: x  Gluc: 200 mg/dL / Ketone: x  / Bili: x / Urobili: x   Blood: x / Protein: x / Nitrite: x   Leuk Esterase: x / RBC: x / WBC x   Sq Epi: x / Non Sq Epi: x / Bacteria: x                  IMPRESSION: 89M w/ HTN, DM2, CAD-CABG, CVA, CKD3, recent COVID PNA c/b trach/decannulation and PEG placement, and recent cholecystitis/cholecystostomy; 6/8/24 from SNF with bloody cholecystostomy output; c/b MABLE    (1)CKD - stage 3b - unclear level of proteinuria - presumed due to DM/HTN  (2)MABLE - nonoliguric ATN from contrast nephropathy, renal fxn improving such that dialysis will likely not be needed here  (3)Hyperphosphatemia - mild - improving     RECOMMEND:  (1)Torsemide as ordered, in effort to maintain nonoliguric status  (2)No further IV contrast for now; could plan for further IV contrast once MABLE resolves  (3)BMP+Mg+PO4 daily  (4)Strict I/Os please  (5)Dose new meds for GFR 15ml/min (present dosing is acceptable)          Wendi Alvarenga NP  [facility]  Office/on call physician: [phone]       NEPHROLOGY-[facility] [phone]        Patient seen and examined no overnight events noted.         MEDICATIONS  (STANDING):  artificial tears (preservative free) Ophthalmic Solution 1 Drop(s) Both EYES four times a day  aspirin  chewable 81 milliGRAM(s) Oral daily  carvedilol 6.25 milliGRAM(s) Oral every 12 hours  chlorhexidine 2% Cloths 1 Application(s) Topical daily  dextrose 10% Bolus 125 milliLiter(s) IV Bolus once  dextrose 5%. 1000 milliLiter(s) (100 mL/Hr) IV Continuous <Continuous>  dextrose 5%. 1000 milliLiter(s) (50 mL/Hr) IV Continuous <Continuous>  dextrose 50% Injectable 25 Gram(s) IV Push once  dextrose 50% Injectable 12.5 Gram(s) IV Push once  doxazosin 2 milliGRAM(s) Oral at bedtime  escitalopram Solution 10 milliGRAM(s) Oral daily  glucagon  Injectable 1 milliGRAM(s) IntraMuscular once  hydrocortisone/polymyxin/neomycin Suspension 5 Drop(s) Left Ear two times a day  insulin glargine Injectable (LANTUS) 12 Unit(s) SubCutaneous every morning  insulin lispro (ADMELOG) corrective regimen sliding scale   SubCutaneous every 6 hours  melatonin 6 milliGRAM(s) Oral at bedtime  mupirocin 2% Ointment 1 Application(s) Both Nostrils two times a day  nystatin Powder 1 Application(s) Topical two times a day  pantoprazole   Suspension 40 milliGRAM(s) Oral daily  polyethylene glycol 3350 17 Gram(s) Oral once  senna Syrup 10 milliLiter(s) Oral at bedtime  sodium chloride 3%  Inhalation 4 milliLiter(s) Inhalation every 8 hours  sucralfate suspension 1 Gram(s) Oral two times a day  ticagrelor 60 milliGRAM(s) Enteral Tube every 12 hours  torsemide 40 milliGRAM(s) Oral daily  torsemide 20 milliGRAM(s) Oral at bedtime  valproic  acid Syrup 250 milliGRAM(s) Oral three times a day      VITAL:  T(C): , Max: 36.7 (06-12-24 @ 22:26)  T(F): , Max: 98.1 (06-12-24 @ 22:26)  HR: 87 (06-13-24 @ 12:00)  BP: 111/- (06-13-24 @ 12:00)  BP(mean): --  RR: 17 (06-13-24 @ 12:00)  SpO2: 97% (06-13-24 @ 12:00)  Wt(kg): --    I and O's:    06-12 @ 07:01  -  06-13 @ 07:00  --------------------------------------------------------  IN: 480 mL / OUT: 950 mL / NET: -470 mL          PHYSICAL EXAM:    Constitutional: frail, NAD  Neck:  No JVD  Respiratory: CTAB/L  Cardiovascular: S1 and S2  Gastrointestinal: + cholecystostomy, + PEG  Extremities: No peripheral edema  Neurological: reduced generalized strength + coarse RUE tremor   Psychiatric: Normal mood, normal affect  : No Moss  Skin: No rashes  Access: Not applicable    LABS:                        10.2   9.09  )-----------( 306      ( 13 Jun 2024 06:00 )             31.5     06-13    143  |  100  |  96<H>  ----------------------------<  200<H>  3.9   |  31  |  2.55<H>    Ca    9.4      13 Jun 2024 06:00  Phos  4.8     06-13  Mg     3.00     06-13            Urine Studies:  Urinalysis Basic - ( 13 Jun 2024 06:00 )    Color: x / Appearance: x / SG: x / pH: x  Gluc: 200 mg/dL / Ketone: x  / Bili: x / Urobili: x   Blood: x / Protein: x / Nitrite: x   Leuk Esterase: x / RBC: x / WBC x   Sq Epi: x / Non Sq Epi: x / Bacteria: x                  IMPRESSION: 89M w/ HTN, DM2, CAD-CABG, CVA, CKD3, recent COVID PNA c/b trach/decannulation and PEG placement, and recent cholecystitis/cholecystostomy; 6/8/24 from SNF with bloody cholecystostomy output; c/b MABLE    (1)CKD - stage 3b - unclear level of proteinuria - presumed due to DM/HTN  (2)MABLE - nonoliguric ATN from contrast nephropathy, renal fxn improving such that dialysis will likely not be needed here  (3)Hyperphosphatemia - mild - improving     RECOMMEND:  (1)Torsemide as ordered, in effort to maintain nonoliguric status  (2)No further IV contrast for now; could plan for further IV contrast once MABLE resolves  (3)BMP+Mg+PO4 daily  (4)Strict I/Os please  (5)Dose new meds for GFR 15ml/min (present dosing is acceptable)          Wendi Alvarenga NP  [facility]  Office/on call physician: [phone]      RENAL ATTENDING NOTE  Patient seen and examined with NP. Agree with assessment and plan as above.  No objection to discharge tomorrow if levels continue to improve; would repeat BMP+Mg+PO4 within 1week of discharge    Davey Chacko MD  [facility]  [phone]

## 2024-06-13 NOTE — CHART NOTE - NSCHARTNOTEFT_GEN_A_CORE
Patient will require the use of a suction machine at home to maintain a patent airway.  Patient on continuous enteral feeding via PEG tube

## 2024-06-13 NOTE — CHART NOTE - NSCHARTNOTEFT_GEN_A_CORE
Patient is a 89M with pmhx of CVA, CAD (post CABG), SMA stenosis, HTN, T2DM, CKD3 and HFrEF, functional quadriplegia, dysphagia, failed cineesophagogram.  Patient s/p swallow VFSS/MBS assessment 6/12/24.  Patient must remain NPO w/no normal means of nutrition or hydration.  There are no other alternatives.  The need is absolute.  Failure of pt to have tube feeds for nutrition can lead to several complications, prolonged healing, prolonged hospitalizations and potentially death.  Length of need will be lifelong.  Feeds must be given continuously on pump, cannot be bolus due to patient tolerance.

## 2024-06-13 NOTE — PROGRESS NOTE ADULT - NUTRITIONAL ASSESSMENT
This patient has been assessed with a concern for Malnutrition and has been determined to have a diagnosis/diagnoses of Severe protein-calorie malnutrition.    This patient is being managed with:   Diet NPO with Tube Feed-  Tube Feeding Modality: Gastrostomy  Glucerna 1.5 Prince (GLUCERNA1.5RTH)  Total Volume for 24 Hours (mL): 1440  Continuous  Starting Tube Feed Rate {mL per Hour}: 30  Increase Tube Feed Rate by (mL): 10     Every hour  Until Goal Tube Feed Rate (mL per Hour): 60  Tube Feed Duration (in Hours): 24  Tube Feed Start Time: 00:00  Tube Feed Stop Time: 00:00  Pump   Rate (mL per Hour): 55   Frequency: Every Hour    Duration (Hours): 20    Start Time: 07:00  Entered: Jun 13 2024  9:34AM  
This patient has been assessed with a concern for Malnutrition and has been determined to have a diagnosis/diagnoses of Severe protein-calorie malnutrition.    This patient is being managed with:   Diet NPO with Tube Feed-  Tube Feeding Modality: Gastrostomy  Glucerna 1.5 Prince (GLUCERNA1.5RTH)  Total Volume for 24 Hours (mL): 150  Continuous  Starting Tube Feed Rate {mL per Hour}: 10  Increase Tube Feed Rate by (mL): 10     Every hour  Until Goal Tube Feed Rate (mL per Hour): 30  Tube Feed Duration (in Hours): 5  Tube Feed Start Time: 01:30  Tube Feed Stop Time: 07:00  Pump   Rate (mL per Hour): 55   Frequency: Every Hour    Duration (Hours): 20    Start Time: 07:00  Entered: Jun 12 2024  1:58AM  
This patient has been assessed with a concern for Malnutrition and has been determined to have a diagnosis/diagnoses of Severe protein-calorie malnutrition.    This patient is being managed with:   Diet NPO-  Except Medications  Entered: Jun 11 2024  4:48AM  
This patient has been assessed with a concern for Malnutrition and has been determined to have a diagnosis/diagnoses of Severe protein-calorie malnutrition.    This patient is being managed with:   Diet NPO-  Except Medications  Entered: Jun 11 2024  4:48AM

## 2024-06-13 NOTE — CHART NOTE - NSCHARTNOTEFT_GEN_A_CORE
Patient is a 89M with pmhx of CVA, CAD (post CABG), SMA stenosis, HTN, T2DM, CKD3 and HFrEF, functional quadriplegia, dysphagia, failed cineesophagogram.  Patient will require a semi electric hospital bed due to quadriplegia.  Patient requires the head of the bed to be elevated more than 30 degrees due to continuous PEG tube feedings.  Pillows and wedges are not effective.  Patient requires positioning of the body in was not feasible with an ordinary bed.  patient requires frequent repositioning to alleviate pain.  The member can independently affect the adjustment by operating the control Patient is a 89M with pmhx of CVA, CAD (post CABG), SMA stenosis, HTN, T2DM, CKD3 and HFrEF, functional quadriplegia, dysphagia, failed cineesophagogram.  Patient will require a semi electric hospital bed due to quadriplegia.  Patient requires the head of the bed to be elevated more than 30 degrees due to continuous PEG tube feedings.  Pillows and wedges are not effective.  Patient requires positioning of the body in was not feasible with an ordinary bed.  patient requires frequent repositioning to alleviate pain.  The member can independently affect the adjustment by operating the control.  Patient will require a gel overlay due to limited mobility, patient cannot make changes to body position without assistance

## 2024-06-13 NOTE — CHART NOTE - NSCHARTNOTEFT_GEN_A_CORE
Patient will require a reclining wheelchair due to quadriplegia.  The patient has a mobility limitation that significantly impairs his ability to participate in one or more MRADLS such as toileting, feeding, dressing, grooming and bathing in customary locations in the home.  The patients mobility limitation cannot be sufficiently resolved by the use of an appropriately fitted cane or walker.  The patient is unable to ambulate with a walker.  Use of a reclining wheelchair will significantly improve the beneficiary's ability to participate in MRADLS and the beneficiary will use it on a regular basis in the home.

## 2024-06-14 ENCOUNTER — TRANSCRIPTION ENCOUNTER (OUTPATIENT)
Age: 89
End: 2024-06-14

## 2024-06-14 VITALS
TEMPERATURE: 98 F | OXYGEN SATURATION: 97 % | SYSTOLIC BLOOD PRESSURE: 120 MMHG | HEART RATE: 90 BPM | RESPIRATION RATE: 18 BRPM | DIASTOLIC BLOOD PRESSURE: 57 MMHG

## 2024-06-14 LAB
ANION GAP SERPL CALC-SCNC: 17 MMOL/L — HIGH (ref 7–14)
BASOPHILS # BLD AUTO: 0.07 K/UL — SIGNIFICANT CHANGE UP (ref 0–0.2)
BASOPHILS NFR BLD AUTO: 0.8 % — SIGNIFICANT CHANGE UP (ref 0–2)
BUN SERPL-MCNC: 100 MG/DL — HIGH (ref 7–23)
CALCIUM SERPL-MCNC: 9.5 MG/DL — SIGNIFICANT CHANGE UP (ref 8.4–10.5)
CHLORIDE SERPL-SCNC: 102 MMOL/L — SIGNIFICANT CHANGE UP (ref 98–107)
CO2 SERPL-SCNC: 29 MMOL/L — SIGNIFICANT CHANGE UP (ref 22–31)
CREAT SERPL-MCNC: 2.41 MG/DL — HIGH (ref 0.5–1.3)
EGFR: 25 ML/MIN/1.73M2 — LOW
EOSINOPHIL # BLD AUTO: 0.34 K/UL — SIGNIFICANT CHANGE UP (ref 0–0.5)
EOSINOPHIL NFR BLD AUTO: 3.7 % — SIGNIFICANT CHANGE UP (ref 0–6)
GLUCOSE BLDC GLUCOMTR-MCNC: 251 MG/DL — HIGH (ref 70–99)
GLUCOSE BLDC GLUCOMTR-MCNC: 264 MG/DL — HIGH (ref 70–99)
GLUCOSE BLDC GLUCOMTR-MCNC: 266 MG/DL — HIGH (ref 70–99)
GLUCOSE SERPL-MCNC: 260 MG/DL — HIGH (ref 70–99)
HCT VFR BLD CALC: 32.2 % — LOW (ref 39–50)
HGB BLD-MCNC: 10.1 G/DL — LOW (ref 13–17)
IANC: 5.52 K/UL — SIGNIFICANT CHANGE UP (ref 1.8–7.4)
IMM GRANULOCYTES NFR BLD AUTO: 0.7 % — SIGNIFICANT CHANGE UP (ref 0–0.9)
LYMPHOCYTES # BLD AUTO: 2.42 K/UL — SIGNIFICANT CHANGE UP (ref 1–3.3)
LYMPHOCYTES # BLD AUTO: 26.3 % — SIGNIFICANT CHANGE UP (ref 13–44)
MAGNESIUM SERPL-MCNC: 3.1 MG/DL — HIGH (ref 1.6–2.6)
MCHC RBC-ENTMCNC: 30.1 PG — SIGNIFICANT CHANGE UP (ref 27–34)
MCHC RBC-ENTMCNC: 31.4 GM/DL — LOW (ref 32–36)
MCV RBC AUTO: 96.1 FL — SIGNIFICANT CHANGE UP (ref 80–100)
MONOCYTES # BLD AUTO: 0.78 K/UL — SIGNIFICANT CHANGE UP (ref 0–0.9)
MONOCYTES NFR BLD AUTO: 8.5 % — SIGNIFICANT CHANGE UP (ref 2–14)
NEUTROPHILS # BLD AUTO: 5.52 K/UL — SIGNIFICANT CHANGE UP (ref 1.8–7.4)
NEUTROPHILS NFR BLD AUTO: 60 % — SIGNIFICANT CHANGE UP (ref 43–77)
NRBC # BLD: 0 /100 WBCS — SIGNIFICANT CHANGE UP (ref 0–0)
NRBC # FLD: 0 K/UL — SIGNIFICANT CHANGE UP (ref 0–0)
PHOSPHATE SERPL-MCNC: 4.3 MG/DL — SIGNIFICANT CHANGE UP (ref 2.5–4.5)
PLATELET # BLD AUTO: 316 K/UL — SIGNIFICANT CHANGE UP (ref 150–400)
POTASSIUM SERPL-MCNC: 4.2 MMOL/L — SIGNIFICANT CHANGE UP (ref 3.5–5.3)
POTASSIUM SERPL-SCNC: 4.2 MMOL/L — SIGNIFICANT CHANGE UP (ref 3.5–5.3)
RBC # BLD: 3.35 M/UL — LOW (ref 4.2–5.8)
RBC # FLD: 18.9 % — HIGH (ref 10.3–14.5)
SODIUM SERPL-SCNC: 148 MMOL/L — HIGH (ref 135–145)
WBC # BLD: 9.19 K/UL — SIGNIFICANT CHANGE UP (ref 3.8–10.5)
WBC # FLD AUTO: 9.19 K/UL — SIGNIFICANT CHANGE UP (ref 3.8–10.5)

## 2024-06-14 RX ORDER — LANOLIN ALCOHOL/MO/W.PET/CERES
1 CREAM (GRAM) TOPICAL
Qty: 30 | Refills: 0
Start: 2024-06-14 | End: 2024-07-13

## 2024-06-14 RX ORDER — ESCITALOPRAM OXALATE 10 MG/1
10 TABLET, FILM COATED ORAL
Qty: 300 | Refills: 0
Start: 2024-06-14 | End: 2024-07-13

## 2024-06-14 RX ORDER — DOXAZOSIN MESYLATE 4 MG
1 TABLET ORAL
Qty: 30 | Refills: 0
Start: 2024-06-14 | End: 2024-07-13

## 2024-06-14 RX ORDER — TICAGRELOR 90 MG/1
1 TABLET ORAL
Qty: 60 | Refills: 0
Start: 2024-06-14 | End: 2024-07-13

## 2024-06-14 RX ORDER — SUCRALFATE 1 G
10 TABLET ORAL
Qty: 600 | Refills: 0
Start: 2024-06-14 | End: 2024-07-13

## 2024-06-14 RX ORDER — TICAGRELOR 90 MG/1
1 TABLET ORAL
Qty: 0 | Refills: 0 | DISCHARGE

## 2024-06-14 RX ORDER — SACCHAROMYCES BOULARDII 250 MG
2 POWDER IN PACKET (EA) ORAL
Refills: 0 | DISCHARGE

## 2024-06-14 RX ORDER — OMEPRAZOLE 10 MG/1
20 CAPSULE, DELAYED RELEASE ORAL
Refills: 0 | DISCHARGE

## 2024-06-14 RX ORDER — CARVEDILOL PHOSPHATE 80 MG/1
1 CAPSULE, EXTENDED RELEASE ORAL
Qty: 60 | Refills: 0
Start: 2024-06-14 | End: 2024-07-13

## 2024-06-14 RX ORDER — VALPROIC ACID (AS SODIUM SALT) 250 MG/5ML
5 SOLUTION, ORAL ORAL
Qty: 450 | Refills: 0
Start: 2024-06-14 | End: 2024-07-13

## 2024-06-14 RX ORDER — OMEPRAZOLE 10 MG/1
10 CAPSULE, DELAYED RELEASE ORAL
Qty: 600 | Refills: 0
Start: 2024-06-14 | End: 2024-07-13

## 2024-06-14 RX ORDER — IPRATROPIUM/ALBUTEROL SULFATE 18-103MCG
3 AEROSOL WITH ADAPTER (GRAM) INHALATION
Qty: 360 | Refills: 0
Start: 2024-06-14 | End: 2024-07-13

## 2024-06-14 RX ORDER — MUPIROCIN 20 MG/G
1 OINTMENT TOPICAL
Qty: 1 | Refills: 0
Start: 2024-06-14 | End: 2024-06-16

## 2024-06-14 RX ORDER — INSULIN ASPART 100 [IU]/ML
1 INJECTION, SOLUTION SUBCUTANEOUS
Qty: 1 | Refills: 0
Start: 2024-06-14

## 2024-06-14 RX ORDER — INSULIN GLARGINE 100 [IU]/ML
17 INJECTION, SOLUTION SUBCUTANEOUS
Qty: 1 | Refills: 0
Start: 2024-06-14

## 2024-06-14 RX ADMIN — Medication 3: at 00:23

## 2024-06-14 RX ADMIN — CARVEDILOL PHOSPHATE 6.25 MILLIGRAM(S): 80 CAPSULE, EXTENDED RELEASE ORAL at 05:52

## 2024-06-14 RX ADMIN — CHLORHEXIDINE GLUCONATE 1 APPLICATION(S): 213 SOLUTION TOPICAL at 12:08

## 2024-06-14 RX ADMIN — MUPIROCIN 1 APPLICATION(S): 20 OINTMENT TOPICAL at 05:54

## 2024-06-14 RX ADMIN — TICAGRELOR 60 MILLIGRAM(S): 90 TABLET ORAL at 05:51

## 2024-06-14 RX ADMIN — PANTOPRAZOLE SODIUM 40 MILLIGRAM(S): 20 TABLET, DELAYED RELEASE ORAL at 12:08

## 2024-06-14 RX ADMIN — INSULIN GLARGINE 12 UNIT(S): 100 INJECTION, SOLUTION SUBCUTANEOUS at 05:49

## 2024-06-14 RX ADMIN — Medication 40 MILLIGRAM(S): at 05:51

## 2024-06-14 RX ADMIN — ESCITALOPRAM OXALATE 10 MILLIGRAM(S): 10 TABLET, FILM COATED ORAL at 12:06

## 2024-06-14 RX ADMIN — Medication 250 MILLIGRAM(S): at 12:21

## 2024-06-14 RX ADMIN — Medication 3: at 12:17

## 2024-06-14 RX ADMIN — NYSTATIN CREAM 1 APPLICATION(S): 100000 CREAM TOPICAL at 05:53

## 2024-06-14 RX ADMIN — Medication 1 DROP(S): at 12:00

## 2024-06-14 RX ADMIN — Medication 81 MILLIGRAM(S): at 11:58

## 2024-06-14 RX ADMIN — Medication 3: at 05:49

## 2024-06-14 RX ADMIN — Medication 1 DROP(S): at 05:52

## 2024-06-14 RX ADMIN — Medication 250 MILLIGRAM(S): at 05:50

## 2024-06-14 RX ADMIN — Medication 1 GRAM(S): at 05:51

## 2024-06-14 NOTE — DISCHARGE NOTE PROVIDER - DETAILS OF MALNUTRITION DIAGNOSIS/DIAGNOSES
This patient has been assessed with a concern for Malnutrition and was treated during this hospitalization for the following Nutrition diagnosis/diagnoses:     -  06/10/2024: Severe protein-calorie malnutrition

## 2024-06-14 NOTE — PROGRESS NOTE ADULT - REASON FOR ADMISSION
Bloody Output from Cholecystostomy

## 2024-06-14 NOTE — DISCHARGE NOTE PROVIDER - NSDCMRMEDTOKEN_GEN_ALL_CORE_FT
acetaminophen 160 mg/5 mL oral suspension: 20.31 milliliter(s) by gastrostomy tube every 6 hours as needed for Temp greater or equal to 38C (100.4F), Mild Pain (1 - 3), Moderate Pain (4 - 6)  aspirin 81 mg oral tablet, chewable: 1 tab(s) by gastrostomy tube once a day  carvedilol 6.25 mg oral tablet: 1 tab(s) by gastrostomy tube every 12 hours  doxazosin 2 mg oral tablet: 1 tab(s) by gastrostomy tube once a day (at bedtime)  escitalopram 5 mg/5 mL oral solution: 10 milliliter(s) by gastrostomy tube once a day  insulin glargine 100 units/mL subcutaneous solution: 17 unit(s) subcutaneous once a day  insulin lispro 100 units/mL injectable solution: 1 unit(s) injectable once a day 2 Unit(s) if Glucose 151 - 200  4 Unit(s) if Glucose 201 - 250  6 Unit(s) if Glucose 251 - 300  8 Unit(s) if Glucose 301 - 350  10 Unit(s) if Glucose 351 - 400  12 Unit(s) if Glucose Greater Than 400  Subcutaneous Every 6 hours  ipratropium-albuterol 0.5 mg-2.5 mg/3 mL inhalation solution: 3 milliliter(s) inhaled 4 times a day  melatonin 3 mg oral tablet: 1 tab(s) by gastrostomy tube once a day (at bedtime)  ocular lubricant ophthalmic solution: 1 drop(s) to each affected eye 4 times a day  omeprazole 20 mg oral tablet, disintegrating, delayed release: 20 milligram(s) orally 2 times a day  saccharomyces boulardii lyo 250 mg oral capsule: 2 cap(s) orally 2 times a day  sucralfate 1 g/10 mL oral suspension: 10 milliliter(s) by gastrostomy tube 2 times a day  ticagrelor 60 mg oral tablet: 1 tab(s) by gastrostomy tube 2 times a day  torsemide 20 mg oral tablet: 2 tab(s) orally once a day  torsemide 20 mg oral tablet: 1 tab(s) orally once a day (at bedtime)  valproic acid 250 mg/5 mL oral liquid: 5 milliliter(s) by gastrostomy tube every 8 hours

## 2024-06-14 NOTE — DISCHARGE NOTE PROVIDER - CARE PROVIDER_API CALL
Hawk Lemon Kuna  Internal Medicine  [address] [place] [id number]  Phone: [phone]  Fax: [phone]  Follow Up Time:

## 2024-06-14 NOTE — DISCHARGE NOTE PROVIDER - CARE PROVIDERS DIRECT ADDRESSES
[email or web address]

## 2024-06-14 NOTE — CHART NOTE - NSCHARTNOTEFT_GEN_A_CORE
The patient has a sacrum stage 3 pressure injury to inferior/distal edge of healed surgical flap with open ulceration.  patient requires low air loss support surface to offload pressure.  The patient cannot make changes in body position without assistance and will need a Low Air Loss Mattress for home use

## 2024-06-14 NOTE — DISCHARGE NOTE PROVIDER - HOSPITAL COURSE
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       Problem/Plan - 1:  ·  Problem: Cholecystostomy care.   ·  Plan: In setting or recent exchange by IR on 6/5.   - Bleeding has resolved.   - IR following.   - Possibly bloody output is self-limiting. Hg is stable. If it continues would look into whether it can be removed (question whether he is a candidate for cholecystectomy at this time?). Patient also on DAPT with recent SMA placement in 12/24. Question whether one anti-platelet can be stopped (re: vascular cardiology consult)  - Monitor output.    < from: CT Abdomen and Pelvis w/ IV Cont (06.08.24 @ 23:57) >  IMPRESSION:    Suboptimal evaluation of the peripheral pulmonary arteries, especially at   the lung bases. No central pulmonary embolus or secondary signs of right   heart strain.    Small to moderate right and small left pleural effusion, decreased since   1/16/2024. Recommend clinical correlation to assess underlying infection.  Additional findings as described.     Problem/Plan - 2:  ·  Problem: CAD (coronary artery disease).   ·  Plan: - Has low level troponinemia in the 100s (seems baseline)  - Continue with DAPT  - Continue with Coreg  - Cardiology help appreciated.      Problem/Plan - 3:  ·  Problem: Chronic systolic heart failure.   ·  Plan: - Continue with Torsemide 40 mg AM and 20 mg PM  - Maintain euvolemia.     Problem/Plan - 4:  ·  Problem: Superior mesenteric artery stenosis.   ·  Plan: - Stent placed in 12/24  - Currently on Aspirin and Brilinta  - Consider vascular if further guidance needed.     Problem/Plan - 5:  ·  Problem: Hypertension.   ·  Plan: - As above.     Problem/Plan - 6:  ·  Problem: Diabetes mellitus.   ·  Plan: - At NH patient is on lantus 17 units daily and insulin sliding scale  - Provide reduce basal insulin here  - FS q6 hours and insulin sliding scale.     Problem/Plan - 7:  ·  Problem: Stage 3 chronic kidney disease with MABLE secondary CIATN.   ·  Plan: - Creatinine trending up.  - Renal help appreciated.      Problem/Plan - 8:  ·  Problem: Cognitive impairment.   ·  Plan: - Had MCI prior to COVID infection  - Currently on Valproic Acid for mood stabilization  - Maintain diurnal cycle  - Also with functional quadriplegia --> PT consult and wound care consult (possible decubitus ulcer)     Problem/Plan - 9:  ·  Problem: Hoarseness Voice with hard of hearing .   ·  Plan: - ENT help appreciated.     Problem/Plan - 10:  ·  Problem: Dysphagia  .   ·  Plan: - Continue TF.   S/S evaluation done .    Cineesophagogram failed    Patient seen and evaluated. Reviewed discharge medications with patient and attending. All new medications requiring new prescriptions were sent to the pharmacy of patient's choice. Reviewed need for prescription for previous home medications and new prescriptions sent if requested. Medically cleared/stable for discharge as per   with appropriate follow up. Patient understands and agrees with plan of care.      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       Problem/Plan - 1:  ·  Problem: Cholecystostomy care.   ·  Plan: In setting or recent exchange by IR on 6/5.   - Bleeding has resolved.   - IR following.   - Possibly bloody output is self-limiting. Hg is stable. If it continues would look into whether it can be removed (question whether he is a candidate for cholecystectomy at this time?). Patient also on DAPT with recent SMA placement in 12/24. Question whether one anti-platelet can be stopped (re: vascular cardiology consult)  - Monitor output.    < from: CT Abdomen and Pelvis w/ IV Cont (06.08.24 @ 23:57) >  IMPRESSION:    Suboptimal evaluation of the peripheral pulmonary arteries, especially at   the lung bases. No central pulmonary embolus or secondary signs of right   heart strain.    Small to moderate right and small left pleural effusion, decreased since   1/16/2024. Recommend clinical correlation to assess underlying infection.  Additional findings as described.     Problem/Plan - 2:  ·  Problem: CAD (coronary artery disease).   ·  Plan: - Has low level troponinemia in the 100s (seems baseline)  - Continue with DAPT  - Continue with Coreg  - Cardiology help appreciated.      Problem/Plan - 3:  ·  Problem: Chronic systolic heart failure.   ·  Plan: - Continue with Torsemide 40 mg AM and 20 mg PM  - Maintain euvolemia.     Problem/Plan - 4:  ·  Problem: Superior mesenteric artery stenosis.   ·  Plan: - Stent placed in 12/24  - Currently on Aspirin and Brilinta  - Consider vascular if further guidance needed.     Problem/Plan - 5:  ·  Problem: Hypertension.   ·  Plan: - As above.     Problem/Plan - 6:  ·  Problem: Diabetes mellitus.   ·  Plan: - At NH patient is on lantus 17 units daily and insulin sliding scale  - Provide reduce basal insulin here  - FS q6 hours and insulin sliding scale.     Problem/Plan - 7:  ·  Problem: Stage 3 chronic kidney disease with MABLE secondary CIATN.   ·  Plan: - Creatinine trending up.  - Renal help appreciated.      Problem/Plan - 8:  ·  Problem: Cognitive impairment.   ·  Plan: - Had MCI prior to COVID infection  - Currently on Valproic Acid for mood stabilization  - Maintain diurnal cycle  - Also with functional quadriplegia --> PT consult and wound care consult (possible decubitus ulcer)     Problem/Plan - 9:  ·  Problem: Hoarseness Voice with hard of hearing .   ·  Plan: - ENT help appreciated.     Problem/Plan - 10:  ·  Problem: Dysphagia  .   ·  Plan: - Continue TF.   S/S evaluation done .    Cineesophagogram failed    Patient seen and evaluated. Reviewed discharge medications with patient and attending. All new medications requiring new prescriptions were sent to the pharmacy of patient's choice. Reviewed need for prescription for previous home medications and new prescriptions sent if requested. Medically cleared/stable for discharge as per Dr. Dinero on 6/14/23  with appropriate follow up. Patient understands and agrees with plan of care.      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       Problem/Plan - 1:  ·  Problem: Cholecystostomy care.   ·  Plan: In setting or recent exchange by IR on 6/5.   - Bleeding has resolved.   - IR following.   - Possibly bloody output is self-limiting. Hg is stable. If it continues would look into whether it can be removed (question whether he is a candidate for cholecystectomy at this time?). Patient also on DAPT with recent SMA placement in 12/24. Question whether one anti-platelet can be stopped (re: vascular cardiology consult)  - Monitor output.    < from: CT Abdomen and Pelvis w/ IV Cont (06.08.24 @ 23:57) >  IMPRESSION:    Suboptimal evaluation of the peripheral pulmonary arteries, especially at   the lung bases. No central pulmonary embolus or secondary signs of right   heart strain.    Small to moderate right and small left pleural effusion, decreased since   1/16/2024. Recommend clinical correlation to assess underlying infection.  Additional findings as described.     Problem/Plan - 2:  ·  Problem: CAD (coronary artery disease).   ·  Plan: - Has low level troponinemia in the 100s (seems baseline)  - Continue with DAPT  - Continue with Coreg  - Cardiology help appreciated.      Problem/Plan - 3:  ·  Problem: Chronic systolic heart failure.   ·  Plan: - Continue with Torsemide 40 mg AM and 20 mg PM  - Maintain euvolemia.     Problem/Plan - 4:  ·  Problem: Superior mesenteric artery stenosis.   ·  Plan: - Stent placed in 12/24  - Currently on Aspirin and Brilinta  - Consider vascular if further guidance needed.     Problem/Plan - 5:  ·  Problem: Hypertension.   ·  Plan: - As above.     Problem/Plan - 6:  ·  Problem: Diabetes mellitus.   ·  Plan: - At NH patient is on lantus 17 units daily and insulin sliding scale  - Provide reduce basal insulin here  - FS q6 hours and insulin sliding scale.     Problem/Plan - 7:  ·  Problem: Stage 3 chronic kidney disease with MABLE secondary CIATN.   ·  Plan: - Creatinine trending up.  - Renal help appreciated.   - CRT trending down, most likely due to IV conrast     Problem/Plan - 8:  ·  Problem: Cognitive impairment.   ·  Plan: - Had MCI prior to COVID infection  - Currently on Valproic Acid for mood stabilization  - Maintain diurnal cycle  - Also with functional quadriplegia --> PT consult and wound care consult (possible decubitus ulcer)  - Home with hospital bed and nursing services, family educated on Tube feeds      Problem/Plan - 9:  ·  Problem: Hoarseness Voice with hard of hearing .   ·  Plan: - ENT help appreciated.     Problem/Plan - 10:  ·  Problem: Dysphagia  .   ·  Plan: - Continue TF.   S/S evaluation done .    Cineesophagogram failed    Patient seen and evaluated. Reviewed discharge medications with patient and attending. All new medications requiring new prescriptions were sent to the pharmacy of patient's choice. Reviewed need for prescription for previous home medications and new prescriptions sent if requested. Medically cleared/stable for discharge as per Dr. Dniero on 6/14/23  with appropriate follow up. Patient understands and agrees with plan of care.

## 2024-06-14 NOTE — DISCHARGE NOTE NURSING/CASE MANAGEMENT/SOCIAL WORK - NSSCTYPOFSERV_GEN_ALL_CORE
Nurse to visit on the day following discharge. Other appropriate services to be arranged thereafter.

## 2024-06-14 NOTE — DISCHARGE NOTE NURSING/CASE MANAGEMENT/SOCIAL WORK - NSDCPEFALRISK_GEN_ALL_CORE
For information on Fall & Injury Prevention, visit: [email or web address] OR  [email or web address] OR  https://www.cdc.gov/steadi/patient.html

## 2024-06-14 NOTE — PROGRESS NOTE ADULT - SUBJECTIVE AND OBJECTIVE BOX
Overnight events noted      VITAL:  T(C): , Max: 37.1 (06-13-24 @ 20:43)  T(F): , Max: 98.8 (06-13-24 @ 20:43)  HR: 90 (06-14-24 @ 05:52)  BP: 120/57 (06-14-24 @ 05:52)  BP(mean): --  RR: 18 (06-14-24 @ 05:52)  SpO2: 98% (06-14-24 @ 05:52)  Wt(kg): --      PHYSICAL EXAM:  Constitutional: frail, NAD  Neck:  No JVD  Respiratory: CTAB/L  Cardiovascular: S1 and S2  Gastrointestinal: + cholecystostomy, + PEG  Extremities: No peripheral edema  Neurological: reduced generalized strength + coarse RUE tremor   : No Moss  Skin: No rashes    LABS:                        10.1   9.19  )-----------( 316      ( 14 Jun 2024 06:50 )             32.2     Na(148)/K(4.2)/Cl(102)/HCO3(29)/BUN(100)/Cr(2.41)Glu(260)/Ca(9.5)/Mg(3.10)/PO4(4.3)    06-14 @ 06:50  Na(143)/K(3.9)/Cl(100)/HCO3(31)/BUN(96)/Cr(2.55)Glu(200)/Ca(9.4)/Mg(3.00)/PO4(4.8)    06-13 @ 06:00  Na(144)/K(3.9)/Cl(99)/HCO3(31)/BUN(98)/Cr(2.74)Glu(135)/Ca(9.4)/Mg(3.00)/PO4(5.9)    06-12 @ 06:40      IMPRESSION: 89M w/ HTN, DM2, CAD-CABG, CVA, CKD3, recent COVID PNA c/b trach/decannulation and PEG placement, and recent cholecystitis/cholecystostomy; 6/8/24 from SNF with bloody cholecystostomy output; c/b MABLE    (1)CKD - stage 3b - unclear level of proteinuria - presumed due to DM/HTN  (2)MABLE - nonoliguric ATN from contrast nephropathy, renal fxn improving such that dialysis will likely not be needed here  (3)Hyperphosphatemia - improving   (4)Hypernatremia - we can place on standing free H2O via PEG    RECOMMEND:  (1)Add Free H2O, 250cc QID via PEG  (2)No renal objection to discharge; BMP in 1 week; can f/u at our office on a prn basis              Davey Chacko MD  Health system  Office/on call physician: [phone]  Cell ([phone]): [phone]

## 2024-06-14 NOTE — DISCHARGE NOTE NURSING/CASE MANAGEMENT/SOCIAL WORK - NSFLUVACAGEDISCH_IMM_ALL_CORE
Adult
Constitutional: No fever, chills, unintended weight loss.  Eyes:  No visual changes, eye pain or discharge.  ENMT:  No hearing changes, pain, no sore throat or runny nose, no difficulty swallowing  Cardiac:  No chest pain, SOB or edema. No chest pain with exertion.  Respiratory:  see hpi  GI:  No nausea, vomiting, diarrhea or abdominal pain.  :  No dysuria, frequency or burning.  MS:  No myalgia, muscle weakness, joint pain or back pain.  Neuro:  No headache or weakness.  No LOC.  Skin:  No skin rash.   Endocrine: No history of thyroid disease or diabetes.

## 2024-06-14 NOTE — PROGRESS NOTE ADULT - PROVIDER SPECIALTY LIST ADULT
Internal Medicine
Nephrology
ENT
Nephrology
Cardiology
Cardiology
Internal Medicine
(1) Other Medications/None

## 2024-06-14 NOTE — DISCHARGE NOTE PROVIDER - NSDCCPCAREPLAN_GEN_ALL_CORE_FT
PRINCIPAL DISCHARGE DIAGNOSIS  Diagnosis: Cholecystostomy care  Assessment and Plan of Treatment: You came in with blood discharge from your Cholecystostomy, this has since resolved, your blood work is stable  please monitor for further bleeding      SECONDARY DISCHARGE DIAGNOSES  Diagnosis: CAD (coronary artery disease)  Assessment and Plan of Treatment: Please continue your medications and follow up with your PCP    Diagnosis: Diabetes mellitus  Assessment and Plan of Treatment: Please continue your medications and follow up with your PCP    Diagnosis: Hypertension  Assessment and Plan of Treatment: Continue blood pressure medication regimen as directed. Monitor for any visual changes, headaches or dizziness.  Monitor blood pressure regularly.  Follow up with your primary care provider for further management for high blood pressure.     PRINCIPAL DISCHARGE DIAGNOSIS  Diagnosis: Cholecystostomy care  Assessment and Plan of Treatment: You came in with blood discharge from your Cholecystostomy, this has since resolved, your blood work is stable  please monitor for further bleeding      SECONDARY DISCHARGE DIAGNOSES  Diagnosis: CAD (coronary artery disease)  Assessment and Plan of Treatment: Please continue your medications and follow up with your PCP    Diagnosis: Diabetes mellitus  Assessment and Plan of Treatment: Please continue your medications and follow up with your PCP    Diagnosis: Hypertension  Assessment and Plan of Treatment: Continue blood pressure medication regimen as directed. Monitor for any visual changes, headaches or dizziness.  Monitor blood pressure regularly.  Follow up with your primary care provider for further management for high blood pressure.    Diagnosis: Uses feeding tube  Assessment and Plan of Treatment: Please continue your tube feedings, and follow up with your PCP for repeat blood work BMP within 3 days of D/C

## 2024-06-14 NOTE — PHARMACOTHERAPY INTERVENTION NOTE - COMMENTS
Discharge medications reviewed with the patient's son and PCP over the phone. Current medication schedule was discussed in detail including: medication name, indication, dose, administration times, treatment duration, side effects, and special instructions. Advised to separate sucralfate suspension from other medications by at least 2 hours, and to hold tube feeds for an hour prior to and after giving sucralfate. Patient's son is requesting refills on all meds since patient was previously in rehab, informed ACP Pierre. Questions and concerns were answered and addressed. Patient's son demonstrated understanding.     Eden Aldridge, PharmD, BCPS  Clinical Pharmacy Specialist  [phone]

## 2024-06-14 NOTE — DISCHARGE NOTE PROVIDER - INSTRUCTIONS
Tube feeds  Glucerna 1.5  30ml/hr continuous 24 hours a day  Tube feeds  Glucerna 1.5  30ml/hr continuous 24 hours a day   Free water flush 250cc 4 times a day, total volume 1,000ml/day

## 2024-06-14 NOTE — DISCHARGE NOTE PROVIDER - NSDCFUSCHEDAPPT_GEN_ALL_CORE_FT
Tanja Allen  [facility]  DARE[facility] PIMENTEL Radiology IRD  Scheduled Appointment: 06/18/2024    [facility] Physician [facility] [phone]  Scheduled Appointment: 06/18/2024

## 2024-06-14 NOTE — DISCHARGE NOTE NURSING/CASE MANAGEMENT/SOCIAL WORK - PATIENT PORTAL LINK FT
You can access the FollowMyHealth Patient Portal offered by [facility] by registering at the following website: [email or web address]. By joining [employer]’s FollowMyHealth portal, you will also be able to view your health information using other applications (apps) compatible with our system.

## 2024-06-14 NOTE — CHART NOTE - NSCHARTNOTESELECT_GEN_ALL_CORE
Event Note
Event Note
GI
IR
Wheel chair/Event Note
Follow-up/Nutrition Services
Hospital Bed/Event Note
Mattress/Event Note
RRT note/Event Note
Suction/Event Note
Tube feeds/Event Note

## 2024-06-18 ENCOUNTER — APPOINTMENT (OUTPATIENT)
Dept: CT IMAGING | Facility: HOSPITAL | Age: 89
End: 2024-06-18

## 2024-06-22 ENCOUNTER — INPATIENT (INPATIENT)
Facility: HOSPITAL | Age: 89
LOS: 7 days | Discharge: HOME HEALTH | End: 2024-06-30
Attending: INTERNAL MEDICINE | Admitting: INTERNAL MEDICINE
Payer: MEDICARE

## 2024-06-22 VITALS
DIASTOLIC BLOOD PRESSURE: 67 MMHG | TEMPERATURE: 98 F | SYSTOLIC BLOOD PRESSURE: 112 MMHG | OXYGEN SATURATION: 98 % | WEIGHT: 169.98 LBS | HEART RATE: 89 BPM | HEIGHT: 69 IN | RESPIRATION RATE: 16 BRPM

## 2024-06-22 DIAGNOSIS — Z98.89 OTHER SPECIFIED POSTPROCEDURAL STATES: Chronic | ICD-10-CM

## 2024-06-22 DIAGNOSIS — Z95.0 PRESENCE OF CARDIAC PACEMAKER: Chronic | ICD-10-CM

## 2024-06-22 DIAGNOSIS — Z95.5 PRESENCE OF CORONARY ANGIOPLASTY IMPLANT AND GRAFT: Chronic | ICD-10-CM

## 2024-06-22 LAB
APTT BLD: 20.7 SEC — LOW (ref 24.5–35.6)
BASOPHILS # BLD AUTO: 0.05 K/UL — SIGNIFICANT CHANGE UP (ref 0–0.2)
BASOPHILS NFR BLD AUTO: 0.5 % — SIGNIFICANT CHANGE UP (ref 0–2)
EOSINOPHIL # BLD AUTO: 0.38 K/UL — SIGNIFICANT CHANGE UP (ref 0–0.5)
EOSINOPHIL NFR BLD AUTO: 3.5 % — SIGNIFICANT CHANGE UP (ref 0–6)
HCT VFR BLD CALC: 31.3 % — LOW (ref 39–50)
HGB BLD-MCNC: 10.1 G/DL — LOW (ref 13–17)
IANC: 6.58 K/UL — SIGNIFICANT CHANGE UP (ref 1.8–7.4)
IMM GRANULOCYTES NFR BLD AUTO: 0.8 % — SIGNIFICANT CHANGE UP (ref 0–0.9)
INR BLD: 1 RATIO — SIGNIFICANT CHANGE UP (ref 0.85–1.18)
LYMPHOCYTES # BLD AUTO: 2.56 K/UL — SIGNIFICANT CHANGE UP (ref 1–3.3)
LYMPHOCYTES # BLD AUTO: 23.8 % — SIGNIFICANT CHANGE UP (ref 13–44)
MCHC RBC-ENTMCNC: 31 PG — SIGNIFICANT CHANGE UP (ref 27–34)
MCHC RBC-ENTMCNC: 32.3 GM/DL — SIGNIFICANT CHANGE UP (ref 32–36)
MCV RBC AUTO: 96 FL — SIGNIFICANT CHANGE UP (ref 80–100)
MONOCYTES # BLD AUTO: 1.08 K/UL — HIGH (ref 0–0.9)
MONOCYTES NFR BLD AUTO: 10.1 % — SIGNIFICANT CHANGE UP (ref 2–14)
NEUTROPHILS # BLD AUTO: 6.58 K/UL — SIGNIFICANT CHANGE UP (ref 1.8–7.4)
NEUTROPHILS NFR BLD AUTO: 61.3 % — SIGNIFICANT CHANGE UP (ref 43–77)
NRBC # BLD: 0 /100 WBCS — SIGNIFICANT CHANGE UP (ref 0–0)
NRBC # FLD: 0 K/UL — SIGNIFICANT CHANGE UP (ref 0–0)
PLATELET # BLD AUTO: 311 K/UL — SIGNIFICANT CHANGE UP (ref 150–400)
PROTHROM AB SERPL-ACNC: 11.2 SEC — SIGNIFICANT CHANGE UP (ref 9.5–13)
RBC # BLD: 3.26 M/UL — LOW (ref 4.2–5.8)
RBC # FLD: 18.3 % — HIGH (ref 10.3–14.5)
WBC # BLD: 10.74 K/UL — HIGH (ref 3.8–10.5)
WBC # FLD AUTO: 10.74 K/UL — HIGH (ref 3.8–10.5)

## 2024-06-22 PROCEDURE — 99285 EMERGENCY DEPT VISIT HI MDM: CPT

## 2024-06-22 RX ORDER — ACETAMINOPHEN 325 MG
1000 TABLET ORAL ONCE
Refills: 0 | Status: COMPLETED | OUTPATIENT
Start: 2024-06-22 | End: 2024-06-22

## 2024-06-22 RX ORDER — SODIUM CHLORIDE 0.9 % (FLUSH) 0.9 %
500 SYRINGE (ML) INJECTION ONCE
Refills: 0 | Status: COMPLETED | OUTPATIENT
Start: 2024-06-22 | End: 2024-06-22

## 2024-06-22 RX ADMIN — Medication 500 MILLILITER(S): at 23:10

## 2024-06-22 RX ADMIN — Medication 400 MILLIGRAM(S): at 23:13

## 2024-06-23 DIAGNOSIS — Z87.19 PERSONAL HISTORY OF OTHER DISEASES OF THE DIGESTIVE SYSTEM: ICD-10-CM

## 2024-06-23 DIAGNOSIS — I25.10 ATHEROSCLEROTIC HEART DISEASE OF NATIVE CORONARY ARTERY WITHOUT ANGINA PECTORIS: ICD-10-CM

## 2024-06-23 DIAGNOSIS — E11.9 TYPE 2 DIABETES MELLITUS WITHOUT COMPLICATIONS: ICD-10-CM

## 2024-06-23 DIAGNOSIS — R53.2 FUNCTIONAL QUADRIPLEGIA: ICD-10-CM

## 2024-06-23 DIAGNOSIS — K94.23 GASTROSTOMY MALFUNCTION: ICD-10-CM

## 2024-06-23 DIAGNOSIS — I10 ESSENTIAL (PRIMARY) HYPERTENSION: ICD-10-CM

## 2024-06-23 DIAGNOSIS — N18.30 CHRONIC KIDNEY DISEASE, STAGE 3 UNSPECIFIED: ICD-10-CM

## 2024-06-23 LAB
ALBUMIN SERPL ELPH-MCNC: 3.1 G/DL — LOW (ref 3.3–5)
ALP SERPL-CCNC: 77 U/L — SIGNIFICANT CHANGE UP (ref 40–120)
ALT FLD-CCNC: 12 U/L — SIGNIFICANT CHANGE UP (ref 4–41)
ANION GAP SERPL CALC-SCNC: 13 MMOL/L — SIGNIFICANT CHANGE UP (ref 7–14)
AST SERPL-CCNC: 19 U/L — SIGNIFICANT CHANGE UP (ref 4–40)
BILIRUB SERPL-MCNC: 0.3 MG/DL — SIGNIFICANT CHANGE UP (ref 0.2–1.2)
BLD GP AB SCN SERPL QL: NEGATIVE — SIGNIFICANT CHANGE UP
BUN SERPL-MCNC: 66 MG/DL — HIGH (ref 7–23)
CALCIUM SERPL-MCNC: 9.3 MG/DL — SIGNIFICANT CHANGE UP (ref 8.4–10.5)
CHLORIDE SERPL-SCNC: 101 MMOL/L — SIGNIFICANT CHANGE UP (ref 98–107)
CO2 SERPL-SCNC: 28 MMOL/L — SIGNIFICANT CHANGE UP (ref 22–31)
CREAT SERPL-MCNC: 1.57 MG/DL — HIGH (ref 0.5–1.3)
EGFR: 42 ML/MIN/1.73M2 — LOW
GLUCOSE BLDC GLUCOMTR-MCNC: 133 MG/DL — HIGH (ref 70–99)
GLUCOSE BLDC GLUCOMTR-MCNC: 153 MG/DL — HIGH (ref 70–99)
GLUCOSE BLDC GLUCOMTR-MCNC: 162 MG/DL — HIGH (ref 70–99)
GLUCOSE BLDC GLUCOMTR-MCNC: 164 MG/DL — HIGH (ref 70–99)
GLUCOSE BLDC GLUCOMTR-MCNC: 202 MG/DL — HIGH (ref 70–99)
GLUCOSE SERPL-MCNC: 155 MG/DL — HIGH (ref 70–99)
POTASSIUM SERPL-MCNC: 4.2 MMOL/L — SIGNIFICANT CHANGE UP (ref 3.5–5.3)
POTASSIUM SERPL-SCNC: 4.2 MMOL/L — SIGNIFICANT CHANGE UP (ref 3.5–5.3)
PROT SERPL-MCNC: 7.9 G/DL — SIGNIFICANT CHANGE UP (ref 6–8.3)
RH IG SCN BLD-IMP: POSITIVE — SIGNIFICANT CHANGE UP
SODIUM SERPL-SCNC: 142 MMOL/L — SIGNIFICANT CHANGE UP (ref 135–145)

## 2024-06-23 PROCEDURE — 74177 CT ABD & PELVIS W/CONTRAST: CPT | Mod: 26,MC

## 2024-06-23 PROCEDURE — 74018 RADEX ABDOMEN 1 VIEW: CPT | Mod: 26

## 2024-06-23 RX ORDER — ESCITALOPRAM OXALATE 20 MG/1
10 TABLET, FILM COATED ORAL DAILY
Refills: 0 | Status: DISCONTINUED | OUTPATIENT
Start: 2024-06-23 | End: 2024-06-30

## 2024-06-23 RX ORDER — DEXTROSE MONOHYDRATE AND SODIUM CHLORIDE 5; .3 G/100ML; G/100ML
1000 INJECTION, SOLUTION INTRAVENOUS
Refills: 0 | Status: DISCONTINUED | OUTPATIENT
Start: 2024-06-23 | End: 2024-06-30

## 2024-06-23 RX ORDER — DOXAZOSIN MESYLATE 2 MG/1
2 TABLET ORAL AT BEDTIME
Refills: 0 | Status: DISCONTINUED | OUTPATIENT
Start: 2024-06-23 | End: 2024-06-30

## 2024-06-23 RX ORDER — ACETAMINOPHEN 325 MG
1000 TABLET ORAL ONCE
Refills: 0 | Status: COMPLETED | OUTPATIENT
Start: 2024-06-23 | End: 2024-06-23

## 2024-06-23 RX ORDER — IOHEXOL 350 MG/ML
20 INJECTION, SOLUTION INTRAVENOUS ONCE
Refills: 0 | Status: DISCONTINUED | OUTPATIENT
Start: 2024-06-23 | End: 2024-06-30

## 2024-06-23 RX ORDER — PANTOPRAZOLE SODIUM 40 MG/10ML
40 INJECTION, POWDER, FOR SOLUTION INTRAVENOUS DAILY
Refills: 0 | Status: DISCONTINUED | OUTPATIENT
Start: 2024-06-23 | End: 2024-06-30

## 2024-06-23 RX ORDER — ASPIRIN 325 MG/1
81 TABLET, FILM COATED ORAL DAILY
Refills: 0 | Status: DISCONTINUED | OUTPATIENT
Start: 2024-06-23 | End: 2024-06-30

## 2024-06-23 RX ORDER — DEXTROSE 30 % IN WATER 30 %
25 VIAL (ML) INTRAVENOUS ONCE
Refills: 0 | Status: DISCONTINUED | OUTPATIENT
Start: 2024-06-23 | End: 2024-06-30

## 2024-06-23 RX ORDER — CARVEDILOL PHOSPHATE 80 MG/1
6.25 CAPSULE, EXTENDED RELEASE ORAL EVERY 12 HOURS
Refills: 0 | Status: DISCONTINUED | OUTPATIENT
Start: 2024-06-23 | End: 2024-06-30

## 2024-06-23 RX ORDER — GLUCAGON HYDROCHLORIDE 1 MG/ML
1 INJECTION, POWDER, FOR SOLUTION INTRAMUSCULAR; INTRAVENOUS; SUBCUTANEOUS ONCE
Refills: 0 | Status: DISCONTINUED | OUTPATIENT
Start: 2024-06-23 | End: 2024-06-30

## 2024-06-23 RX ORDER — DEXTROSE 30 % IN WATER 30 %
15 VIAL (ML) INTRAVENOUS ONCE
Refills: 0 | Status: DISCONTINUED | OUTPATIENT
Start: 2024-06-23 | End: 2024-06-30

## 2024-06-23 RX ORDER — DEXTROSE MONOHYDRATE AND SODIUM CHLORIDE 5; .3 G/100ML; G/100ML
1000 INJECTION, SOLUTION INTRAVENOUS
Refills: 0 | Status: DISCONTINUED | OUTPATIENT
Start: 2024-06-23 | End: 2024-06-24

## 2024-06-23 RX ORDER — LIPASE/PROTEASE/AMYLASE 4.5-25-20K
1 CAPSULE,DELAYED RELEASE (ENTERIC COATED) ORAL ONCE
Refills: 0 | Status: COMPLETED | OUTPATIENT
Start: 2024-06-23 | End: 2024-06-23

## 2024-06-23 RX ORDER — SODIUM CHLORIDE 0.9 % (FLUSH) 0.9 %
1000 SYRINGE (ML) INJECTION ONCE
Refills: 0 | Status: COMPLETED | OUTPATIENT
Start: 2024-06-23 | End: 2024-06-23

## 2024-06-23 RX ORDER — POLYVINYL ALCOHOL, POVIDONE .5; .6 G/100ML; G/100ML
1 SOLUTION OPHTHALMIC
Refills: 0 | Status: DISCONTINUED | OUTPATIENT
Start: 2024-06-23 | End: 2024-06-30

## 2024-06-23 RX ORDER — ACETAMINOPHEN 325 MG
770 TABLET ORAL EVERY 6 HOURS
Refills: 0 | Status: DISCONTINUED | OUTPATIENT
Start: 2024-06-23 | End: 2024-06-30

## 2024-06-23 RX ORDER — SODIUM BICARBONATE 650 MG/1
650 TABLET ORAL ONCE
Refills: 0 | Status: COMPLETED | OUTPATIENT
Start: 2024-06-23 | End: 2024-06-23

## 2024-06-23 RX ORDER — DEXTROSE MONOHYDRATE 100 MG/ML
125 INJECTION, SOLUTION INTRAVENOUS ONCE
Refills: 0 | Status: DISCONTINUED | OUTPATIENT
Start: 2024-06-23 | End: 2024-06-30

## 2024-06-23 RX ORDER — TORSEMIDE 20 MG/1
40 TABLET ORAL DAILY
Refills: 0 | Status: DISCONTINUED | OUTPATIENT
Start: 2024-06-23 | End: 2024-06-30

## 2024-06-23 RX ORDER — TORSEMIDE 20 MG/1
20 TABLET ORAL AT BEDTIME
Refills: 0 | Status: DISCONTINUED | OUTPATIENT
Start: 2024-06-23 | End: 2024-06-30

## 2024-06-23 RX ORDER — PANTOPRAZOLE SODIUM 40 MG/10ML
40 INJECTION, POWDER, FOR SOLUTION INTRAVENOUS ONCE
Refills: 0 | Status: COMPLETED | OUTPATIENT
Start: 2024-06-23 | End: 2024-06-23

## 2024-06-23 RX ORDER — VALPROIC ACID 250 MG/5ML
250 SOLUTION ORAL THREE TIMES A DAY
Refills: 0 | Status: DISCONTINUED | OUTPATIENT
Start: 2024-06-23 | End: 2024-06-26

## 2024-06-23 RX ORDER — INSULIN LISPRO 100 [IU]/ML
INJECTION, SOLUTION SUBCUTANEOUS
Refills: 0 | Status: DISCONTINUED | OUTPATIENT
Start: 2024-06-23 | End: 2024-06-30

## 2024-06-23 RX ORDER — INSULIN LISPRO 100 [IU]/ML
INJECTION, SOLUTION SUBCUTANEOUS AT BEDTIME
Refills: 0 | Status: DISCONTINUED | OUTPATIENT
Start: 2024-06-23 | End: 2024-06-30

## 2024-06-23 RX ORDER — SUCRALFATE 1 G
1 TABLET ORAL
Refills: 0 | Status: DISCONTINUED | OUTPATIENT
Start: 2024-06-23 | End: 2024-06-30

## 2024-06-23 RX ORDER — TICAGRELOR 90 MG/1
60 TABLET ORAL EVERY 12 HOURS
Refills: 0 | Status: DISCONTINUED | OUTPATIENT
Start: 2024-06-23 | End: 2024-06-30

## 2024-06-23 RX ORDER — DEXTROSE 30 % IN WATER 30 %
12.5 VIAL (ML) INTRAVENOUS ONCE
Refills: 0 | Status: DISCONTINUED | OUTPATIENT
Start: 2024-06-23 | End: 2024-06-30

## 2024-06-23 RX ORDER — VALPROIC ACID 250 MG/5ML
250 SOLUTION ORAL ONCE
Refills: 0 | Status: COMPLETED | OUTPATIENT
Start: 2024-06-23 | End: 2024-06-23

## 2024-06-23 RX ORDER — DEXTROSE MONOHYDRATE AND SODIUM CHLORIDE 5; .3 G/100ML; G/100ML
1000 INJECTION, SOLUTION INTRAVENOUS
Refills: 0 | Status: DISCONTINUED | OUTPATIENT
Start: 2024-06-23 | End: 2024-06-23

## 2024-06-23 RX ORDER — INSULIN GLARGINE 100 [IU]/ML
10 INJECTION, SOLUTION SUBCUTANEOUS EVERY MORNING
Refills: 0 | Status: DISCONTINUED | OUTPATIENT
Start: 2024-06-24 | End: 2024-06-30

## 2024-06-23 RX ADMIN — Medication 400 MILLIGRAM(S): at 15:36

## 2024-06-23 RX ADMIN — DEXTROSE MONOHYDRATE AND SODIUM CHLORIDE 75 MILLILITER(S): 5; .3 INJECTION, SOLUTION INTRAVENOUS at 06:17

## 2024-06-23 RX ADMIN — POLYVINYL ALCOHOL, POVIDONE 1 DROP(S): .5; .6 SOLUTION OPHTHALMIC at 11:25

## 2024-06-23 RX ADMIN — Medication 1000 MILLILITER(S): at 02:13

## 2024-06-23 RX ADMIN — VALPROIC ACID 52.5 MILLIGRAM(S): 250 SOLUTION ORAL at 11:47

## 2024-06-23 RX ADMIN — Medication 1 TABLET(S): at 15:36

## 2024-06-23 RX ADMIN — DEXTROSE MONOHYDRATE AND SODIUM CHLORIDE 50 MILLILITER(S): 5; .3 INJECTION, SOLUTION INTRAVENOUS at 11:25

## 2024-06-23 RX ADMIN — INSULIN LISPRO 2: 100 INJECTION, SOLUTION SUBCUTANEOUS at 18:01

## 2024-06-23 RX ADMIN — PANTOPRAZOLE SODIUM 40 MILLIGRAM(S): 40 INJECTION, POWDER, FOR SOLUTION INTRAVENOUS at 11:25

## 2024-06-23 RX ADMIN — INSULIN LISPRO 4: 100 INJECTION, SOLUTION SUBCUTANEOUS at 12:32

## 2024-06-23 RX ADMIN — POLYVINYL ALCOHOL, POVIDONE 1 DROP(S): .5; .6 SOLUTION OPHTHALMIC at 18:00

## 2024-06-23 RX ADMIN — SODIUM BICARBONATE 650 MILLIGRAM(S): 650 TABLET ORAL at 15:36

## 2024-06-24 LAB
GLUCOSE BLDC GLUCOMTR-MCNC: 153 MG/DL — HIGH (ref 70–99)
GLUCOSE BLDC GLUCOMTR-MCNC: 182 MG/DL — HIGH (ref 70–99)
GLUCOSE BLDC GLUCOMTR-MCNC: 183 MG/DL — HIGH (ref 70–99)
GLUCOSE BLDC GLUCOMTR-MCNC: 191 MG/DL — HIGH (ref 70–99)
GLUCOSE BLDC GLUCOMTR-MCNC: 192 MG/DL — HIGH (ref 70–99)

## 2024-06-24 PROCEDURE — 99221 1ST HOSP IP/OBS SF/LOW 40: CPT | Mod: GC

## 2024-06-24 RX ADMIN — INSULIN LISPRO 2: 100 INJECTION, SOLUTION SUBCUTANEOUS at 08:04

## 2024-06-24 RX ADMIN — INSULIN LISPRO 2: 100 INJECTION, SOLUTION SUBCUTANEOUS at 17:22

## 2024-06-24 RX ADMIN — CARVEDILOL PHOSPHATE 6.25 MILLIGRAM(S): 80 CAPSULE, EXTENDED RELEASE ORAL at 18:49

## 2024-06-24 RX ADMIN — DOXAZOSIN MESYLATE 2 MILLIGRAM(S): 2 TABLET ORAL at 22:23

## 2024-06-24 RX ADMIN — ASPIRIN 81 MILLIGRAM(S): 325 TABLET, FILM COATED ORAL at 12:30

## 2024-06-24 RX ADMIN — TORSEMIDE 20 MILLIGRAM(S): 20 TABLET ORAL at 23:11

## 2024-06-24 RX ADMIN — TORSEMIDE 40 MILLIGRAM(S): 20 TABLET ORAL at 10:44

## 2024-06-24 RX ADMIN — ESCITALOPRAM OXALATE 10 MILLIGRAM(S): 20 TABLET, FILM COATED ORAL at 12:30

## 2024-06-24 RX ADMIN — POLYVINYL ALCOHOL, POVIDONE 1 DROP(S): .5; .6 SOLUTION OPHTHALMIC at 08:06

## 2024-06-24 RX ADMIN — TICAGRELOR 60 MILLIGRAM(S): 90 TABLET ORAL at 22:26

## 2024-06-24 RX ADMIN — VALPROIC ACID 250 MILLIGRAM(S): 250 SOLUTION ORAL at 23:30

## 2024-06-24 RX ADMIN — INSULIN LISPRO 2: 100 INJECTION, SOLUTION SUBCUTANEOUS at 12:29

## 2024-06-24 RX ADMIN — POLYVINYL ALCOHOL, POVIDONE 1 DROP(S): .5; .6 SOLUTION OPHTHALMIC at 18:30

## 2024-06-24 RX ADMIN — Medication 3 MILLIGRAM(S): at 22:23

## 2024-06-24 RX ADMIN — TICAGRELOR 60 MILLIGRAM(S): 90 TABLET ORAL at 10:45

## 2024-06-24 RX ADMIN — PANTOPRAZOLE SODIUM 40 MILLIGRAM(S): 40 INJECTION, POWDER, FOR SOLUTION INTRAVENOUS at 12:30

## 2024-06-24 RX ADMIN — INSULIN GLARGINE 10 UNIT(S): 100 INJECTION, SOLUTION SUBCUTANEOUS at 08:04

## 2024-06-24 RX ADMIN — POLYVINYL ALCOHOL, POVIDONE 1 DROP(S): .5; .6 SOLUTION OPHTHALMIC at 00:30

## 2024-06-24 RX ADMIN — CARVEDILOL PHOSPHATE 6.25 MILLIGRAM(S): 80 CAPSULE, EXTENDED RELEASE ORAL at 10:44

## 2024-06-24 RX ADMIN — POLYVINYL ALCOHOL, POVIDONE 1 DROP(S): .5; .6 SOLUTION OPHTHALMIC at 12:30

## 2024-06-24 RX ADMIN — Medication 1 GRAM(S): at 18:49

## 2024-06-24 RX ADMIN — Medication 1 GRAM(S): at 10:44

## 2024-06-24 RX ADMIN — VALPROIC ACID 250 MILLIGRAM(S): 250 SOLUTION ORAL at 10:45

## 2024-06-25 ENCOUNTER — TRANSCRIPTION ENCOUNTER (OUTPATIENT)
Age: 89
End: 2024-06-25

## 2024-06-25 LAB
ANION GAP SERPL CALC-SCNC: 12 MMOL/L — SIGNIFICANT CHANGE UP (ref 7–14)
BUN SERPL-MCNC: 39 MG/DL — HIGH (ref 7–23)
CALCIUM SERPL-MCNC: 9.2 MG/DL — SIGNIFICANT CHANGE UP (ref 8.4–10.5)
CHLORIDE SERPL-SCNC: 110 MMOL/L — HIGH (ref 98–107)
CO2 SERPL-SCNC: 25 MMOL/L — SIGNIFICANT CHANGE UP (ref 22–31)
CREAT SERPL-MCNC: 1.44 MG/DL — HIGH (ref 0.5–1.3)
EGFR: 46 ML/MIN/1.73M2 — LOW
GLUCOSE BLDC GLUCOMTR-MCNC: 179 MG/DL — HIGH (ref 70–99)
GLUCOSE BLDC GLUCOMTR-MCNC: 221 MG/DL — HIGH (ref 70–99)
GLUCOSE BLDC GLUCOMTR-MCNC: 266 MG/DL — HIGH (ref 70–99)
GLUCOSE BLDC GLUCOMTR-MCNC: 274 MG/DL — HIGH (ref 70–99)
GLUCOSE SERPL-MCNC: 242 MG/DL — HIGH (ref 70–99)
HCT VFR BLD CALC: 32.7 % — LOW (ref 39–50)
HGB BLD-MCNC: 10 G/DL — LOW (ref 13–17)
MAGNESIUM SERPL-MCNC: 2.1 MG/DL — SIGNIFICANT CHANGE UP (ref 1.6–2.6)
MCHC RBC-ENTMCNC: 30 PG — SIGNIFICANT CHANGE UP (ref 27–34)
MCHC RBC-ENTMCNC: 30.6 GM/DL — LOW (ref 32–36)
MCV RBC AUTO: 98.2 FL — SIGNIFICANT CHANGE UP (ref 80–100)
NRBC # BLD: 0 /100 WBCS — SIGNIFICANT CHANGE UP (ref 0–0)
NRBC # FLD: 0 K/UL — SIGNIFICANT CHANGE UP (ref 0–0)
PHOSPHATE SERPL-MCNC: 4 MG/DL — SIGNIFICANT CHANGE UP (ref 2.5–4.5)
PLATELET # BLD AUTO: 364 K/UL — SIGNIFICANT CHANGE UP (ref 150–400)
POTASSIUM SERPL-MCNC: 4.1 MMOL/L — SIGNIFICANT CHANGE UP (ref 3.5–5.3)
POTASSIUM SERPL-SCNC: 4.1 MMOL/L — SIGNIFICANT CHANGE UP (ref 3.5–5.3)
RBC # BLD: 3.33 M/UL — LOW (ref 4.2–5.8)
RBC # FLD: 17.9 % — HIGH (ref 10.3–14.5)
SODIUM SERPL-SCNC: 147 MMOL/L — HIGH (ref 135–145)
WBC # BLD: 9.3 K/UL — SIGNIFICANT CHANGE UP (ref 3.8–10.5)
WBC # FLD AUTO: 9.3 K/UL — SIGNIFICANT CHANGE UP (ref 3.8–10.5)

## 2024-06-25 RX ORDER — HEPARIN SODIUM 50 [USP'U]/ML
5000 INJECTION, SOLUTION INTRAVENOUS EVERY 12 HOURS
Refills: 0 | Status: DISCONTINUED | OUTPATIENT
Start: 2024-06-25 | End: 2024-06-26

## 2024-06-25 RX ADMIN — Medication 1 GRAM(S): at 05:27

## 2024-06-25 RX ADMIN — DOXAZOSIN MESYLATE 2 MILLIGRAM(S): 2 TABLET ORAL at 23:10

## 2024-06-25 RX ADMIN — TICAGRELOR 60 MILLIGRAM(S): 90 TABLET ORAL at 18:05

## 2024-06-25 RX ADMIN — POLYVINYL ALCOHOL, POVIDONE 1 DROP(S): .5; .6 SOLUTION OPHTHALMIC at 00:45

## 2024-06-25 RX ADMIN — INSULIN LISPRO 6: 100 INJECTION, SOLUTION SUBCUTANEOUS at 08:26

## 2024-06-25 RX ADMIN — VALPROIC ACID 250 MILLIGRAM(S): 250 SOLUTION ORAL at 05:27

## 2024-06-25 RX ADMIN — VALPROIC ACID 250 MILLIGRAM(S): 250 SOLUTION ORAL at 23:36

## 2024-06-25 RX ADMIN — CARVEDILOL PHOSPHATE 6.25 MILLIGRAM(S): 80 CAPSULE, EXTENDED RELEASE ORAL at 17:40

## 2024-06-25 RX ADMIN — Medication 3 MILLIGRAM(S): at 23:11

## 2024-06-25 RX ADMIN — PANTOPRAZOLE SODIUM 40 MILLIGRAM(S): 40 INJECTION, POWDER, FOR SOLUTION INTRAVENOUS at 12:08

## 2024-06-25 RX ADMIN — TORSEMIDE 20 MILLIGRAM(S): 20 TABLET ORAL at 23:36

## 2024-06-25 RX ADMIN — INSULIN LISPRO 4: 100 INJECTION, SOLUTION SUBCUTANEOUS at 17:39

## 2024-06-25 RX ADMIN — Medication 770 MILLIGRAM(S): at 18:05

## 2024-06-25 RX ADMIN — INSULIN GLARGINE 10 UNIT(S): 100 INJECTION, SOLUTION SUBCUTANEOUS at 08:28

## 2024-06-25 RX ADMIN — ESCITALOPRAM OXALATE 10 MILLIGRAM(S): 20 TABLET, FILM COATED ORAL at 12:08

## 2024-06-25 RX ADMIN — INSULIN LISPRO 6: 100 INJECTION, SOLUTION SUBCUTANEOUS at 12:09

## 2024-06-25 RX ADMIN — POLYVINYL ALCOHOL, POVIDONE 1 DROP(S): .5; .6 SOLUTION OPHTHALMIC at 17:40

## 2024-06-25 RX ADMIN — TORSEMIDE 40 MILLIGRAM(S): 20 TABLET ORAL at 05:28

## 2024-06-25 RX ADMIN — ASPIRIN 81 MILLIGRAM(S): 325 TABLET, FILM COATED ORAL at 12:08

## 2024-06-25 RX ADMIN — Medication 1 GRAM(S): at 17:40

## 2024-06-25 RX ADMIN — TICAGRELOR 60 MILLIGRAM(S): 90 TABLET ORAL at 05:33

## 2024-06-25 RX ADMIN — POLYVINYL ALCOHOL, POVIDONE 1 DROP(S): .5; .6 SOLUTION OPHTHALMIC at 23:11

## 2024-06-25 RX ADMIN — VALPROIC ACID 250 MILLIGRAM(S): 250 SOLUTION ORAL at 14:19

## 2024-06-25 RX ADMIN — CARVEDILOL PHOSPHATE 6.25 MILLIGRAM(S): 80 CAPSULE, EXTENDED RELEASE ORAL at 05:27

## 2024-06-25 RX ADMIN — POLYVINYL ALCOHOL, POVIDONE 1 DROP(S): .5; .6 SOLUTION OPHTHALMIC at 05:28

## 2024-06-25 RX ADMIN — POLYVINYL ALCOHOL, POVIDONE 1 DROP(S): .5; .6 SOLUTION OPHTHALMIC at 12:08

## 2024-06-26 LAB
ANION GAP SERPL CALC-SCNC: 13 MMOL/L — SIGNIFICANT CHANGE UP (ref 7–14)
BUN SERPL-MCNC: 46 MG/DL — HIGH (ref 7–23)
CALCIUM SERPL-MCNC: 8.8 MG/DL — SIGNIFICANT CHANGE UP (ref 8.4–10.5)
CHLORIDE SERPL-SCNC: 106 MMOL/L — SIGNIFICANT CHANGE UP (ref 98–107)
CO2 SERPL-SCNC: 25 MMOL/L — SIGNIFICANT CHANGE UP (ref 22–31)
CREAT SERPL-MCNC: 1.49 MG/DL — HIGH (ref 0.5–1.3)
EGFR: 44 ML/MIN/1.73M2 — LOW
GLUCOSE BLDC GLUCOMTR-MCNC: 103 MG/DL — HIGH (ref 70–99)
GLUCOSE BLDC GLUCOMTR-MCNC: 112 MG/DL — HIGH (ref 70–99)
GLUCOSE BLDC GLUCOMTR-MCNC: 244 MG/DL — HIGH (ref 70–99)
GLUCOSE BLDC GLUCOMTR-MCNC: 254 MG/DL — HIGH (ref 70–99)
GLUCOSE BLDC GLUCOMTR-MCNC: 266 MG/DL — HIGH (ref 70–99)
GLUCOSE BLDC GLUCOMTR-MCNC: 71 MG/DL — SIGNIFICANT CHANGE UP (ref 70–99)
GLUCOSE BLDC GLUCOMTR-MCNC: 88 MG/DL — SIGNIFICANT CHANGE UP (ref 70–99)
GLUCOSE SERPL-MCNC: 75 MG/DL — SIGNIFICANT CHANGE UP (ref 70–99)
HCT VFR BLD CALC: 30.9 % — LOW (ref 39–50)
HGB BLD-MCNC: 9.9 G/DL — LOW (ref 13–17)
LACTATE SERPL-SCNC: 1.9 MMOL/L — SIGNIFICANT CHANGE UP (ref 0.5–2)
MAGNESIUM SERPL-MCNC: 2.1 MG/DL — SIGNIFICANT CHANGE UP (ref 1.6–2.6)
MCHC RBC-ENTMCNC: 30.1 PG — SIGNIFICANT CHANGE UP (ref 27–34)
MCHC RBC-ENTMCNC: 32 GM/DL — SIGNIFICANT CHANGE UP (ref 32–36)
MCV RBC AUTO: 93.9 FL — SIGNIFICANT CHANGE UP (ref 80–100)
NRBC # BLD: 0 /100 WBCS — SIGNIFICANT CHANGE UP (ref 0–0)
NRBC # FLD: 0 K/UL — SIGNIFICANT CHANGE UP (ref 0–0)
PHOSPHATE SERPL-MCNC: 4.8 MG/DL — HIGH (ref 2.5–4.5)
PLATELET # BLD AUTO: 365 K/UL — SIGNIFICANT CHANGE UP (ref 150–400)
POTASSIUM SERPL-MCNC: 4.3 MMOL/L — SIGNIFICANT CHANGE UP (ref 3.5–5.3)
POTASSIUM SERPL-SCNC: 4.3 MMOL/L — SIGNIFICANT CHANGE UP (ref 3.5–5.3)
RBC # BLD: 3.29 M/UL — LOW (ref 4.2–5.8)
RBC # FLD: 18.2 % — HIGH (ref 10.3–14.5)
SODIUM SERPL-SCNC: 144 MMOL/L — SIGNIFICANT CHANGE UP (ref 135–145)
WBC # BLD: 8.93 K/UL — SIGNIFICANT CHANGE UP (ref 3.8–10.5)
WBC # FLD AUTO: 8.93 K/UL — SIGNIFICANT CHANGE UP (ref 3.8–10.5)

## 2024-06-26 PROCEDURE — 74176 CT ABD & PELVIS W/O CONTRAST: CPT | Mod: 26

## 2024-06-26 PROCEDURE — 43761 REPOSITION GASTROSTOMY TUBE: CPT | Mod: GC

## 2024-06-26 PROCEDURE — 74018 RADEX ABDOMEN 1 VIEW: CPT | Mod: 26,76

## 2024-06-26 PROCEDURE — 74230 X-RAY XM SWLNG FUNCJ C+: CPT | Mod: 26

## 2024-06-26 RX ORDER — ACETAMINOPHEN 325 MG
1000 TABLET ORAL ONCE
Refills: 0 | Status: COMPLETED | OUTPATIENT
Start: 2024-06-26 | End: 2024-06-26

## 2024-06-26 RX ORDER — DEXTROSE 30 % IN WATER 30 %
25 VIAL (ML) INTRAVENOUS ONCE
Refills: 0 | Status: COMPLETED | OUTPATIENT
Start: 2024-06-26 | End: 2024-06-26

## 2024-06-26 RX ORDER — VALPROIC ACID 250 MG/5ML
250 SOLUTION ORAL THREE TIMES A DAY
Refills: 0 | Status: DISCONTINUED | OUTPATIENT
Start: 2024-06-26 | End: 2024-06-28

## 2024-06-26 RX ORDER — DEXTROSE MONOHYDRATE AND SODIUM CHLORIDE 5; .3 G/100ML; G/100ML
1000 INJECTION, SOLUTION INTRAVENOUS
Refills: 0 | Status: DISCONTINUED | OUTPATIENT
Start: 2024-06-26 | End: 2024-06-29

## 2024-06-26 RX ORDER — DEXTROSE MONOHYDRATE AND SODIUM CHLORIDE 5; .3 G/100ML; G/100ML
1000 INJECTION, SOLUTION INTRAVENOUS
Refills: 0 | Status: DISCONTINUED | OUTPATIENT
Start: 2024-06-26 | End: 2024-06-26

## 2024-06-26 RX ORDER — DIATRIZOATE MEGLUMINE 60 %
40 VIAL (ML) INJECTION ONCE
Refills: 0 | Status: COMPLETED | OUTPATIENT
Start: 2024-06-26 | End: 2024-06-26

## 2024-06-26 RX ORDER — HYDROMORPHONE HCL 0.2 MG/ML
0.25 INJECTION, SOLUTION INTRAVENOUS ONCE
Refills: 0 | Status: DISCONTINUED | OUTPATIENT
Start: 2024-06-26 | End: 2024-06-26

## 2024-06-26 RX ADMIN — INSULIN LISPRO 6: 100 INJECTION, SOLUTION SUBCUTANEOUS at 12:01

## 2024-06-26 RX ADMIN — POLYVINYL ALCOHOL, POVIDONE 1 DROP(S): .5; .6 SOLUTION OPHTHALMIC at 17:08

## 2024-06-26 RX ADMIN — CARVEDILOL PHOSPHATE 6.25 MILLIGRAM(S): 80 CAPSULE, EXTENDED RELEASE ORAL at 07:01

## 2024-06-26 RX ADMIN — TORSEMIDE 40 MILLIGRAM(S): 20 TABLET ORAL at 07:00

## 2024-06-26 RX ADMIN — HYDROMORPHONE HCL 0.25 MILLIGRAM(S): 0.2 INJECTION, SOLUTION INTRAVENOUS at 19:18

## 2024-06-26 RX ADMIN — Medication 40 MILLILITER(S): at 23:36

## 2024-06-26 RX ADMIN — TICAGRELOR 60 MILLIGRAM(S): 90 TABLET ORAL at 07:01

## 2024-06-26 RX ADMIN — VALPROIC ACID 52.5 MILLIGRAM(S): 250 SOLUTION ORAL at 22:00

## 2024-06-26 RX ADMIN — POLYVINYL ALCOHOL, POVIDONE 1 DROP(S): .5; .6 SOLUTION OPHTHALMIC at 07:00

## 2024-06-26 RX ADMIN — HYDROMORPHONE HCL 0.25 MILLIGRAM(S): 0.2 INJECTION, SOLUTION INTRAVENOUS at 19:09

## 2024-06-26 RX ADMIN — DEXTROSE MONOHYDRATE AND SODIUM CHLORIDE 80 MILLILITER(S): 5; .3 INJECTION, SOLUTION INTRAVENOUS at 23:44

## 2024-06-26 RX ADMIN — Medication 400 MILLIGRAM(S): at 18:30

## 2024-06-26 RX ADMIN — DEXTROSE MONOHYDRATE AND SODIUM CHLORIDE 50 MILLILITER(S): 5; .3 INJECTION, SOLUTION INTRAVENOUS at 11:59

## 2024-06-26 RX ADMIN — INSULIN GLARGINE 10 UNIT(S): 100 INJECTION, SOLUTION SUBCUTANEOUS at 07:49

## 2024-06-26 RX ADMIN — VALPROIC ACID 250 MILLIGRAM(S): 250 SOLUTION ORAL at 07:01

## 2024-06-26 RX ADMIN — Medication 25 MILLILITER(S): at 18:44

## 2024-06-26 RX ADMIN — DEXTROSE MONOHYDRATE AND SODIUM CHLORIDE 75 MILLILITER(S): 5; .3 INJECTION, SOLUTION INTRAVENOUS at 18:44

## 2024-06-26 RX ADMIN — HEPARIN SODIUM 5000 UNIT(S): 50 INJECTION, SOLUTION INTRAVENOUS at 07:01

## 2024-06-26 RX ADMIN — Medication 1 GRAM(S): at 07:00

## 2024-06-26 RX ADMIN — POLYVINYL ALCOHOL, POVIDONE 1 DROP(S): .5; .6 SOLUTION OPHTHALMIC at 12:30

## 2024-06-26 RX ADMIN — Medication 400 MILLIGRAM(S): at 11:59

## 2024-06-27 LAB
ANION GAP SERPL CALC-SCNC: 12 MMOL/L — SIGNIFICANT CHANGE UP (ref 7–14)
BUN SERPL-MCNC: 34 MG/DL — HIGH (ref 7–23)
CALCIUM SERPL-MCNC: 8.8 MG/DL — SIGNIFICANT CHANGE UP (ref 8.4–10.5)
CHLORIDE SERPL-SCNC: 111 MMOL/L — HIGH (ref 98–107)
CO2 SERPL-SCNC: 23 MMOL/L — SIGNIFICANT CHANGE UP (ref 22–31)
CREAT SERPL-MCNC: 1.31 MG/DL — HIGH (ref 0.5–1.3)
EGFR: 52 ML/MIN/1.73M2 — LOW
GLUCOSE BLDC GLUCOMTR-MCNC: 132 MG/DL — HIGH (ref 70–99)
GLUCOSE BLDC GLUCOMTR-MCNC: 161 MG/DL — HIGH (ref 70–99)
GLUCOSE BLDC GLUCOMTR-MCNC: 193 MG/DL — HIGH (ref 70–99)
GLUCOSE BLDC GLUCOMTR-MCNC: 237 MG/DL — HIGH (ref 70–99)
GLUCOSE SERPL-MCNC: 144 MG/DL — HIGH (ref 70–99)
HCT VFR BLD CALC: 29.8 % — LOW (ref 39–50)
HGB BLD-MCNC: 9.4 G/DL — LOW (ref 13–17)
MAGNESIUM SERPL-MCNC: 2 MG/DL — SIGNIFICANT CHANGE UP (ref 1.6–2.6)
MCHC RBC-ENTMCNC: 29.9 PG — SIGNIFICANT CHANGE UP (ref 27–34)
MCHC RBC-ENTMCNC: 31.5 GM/DL — LOW (ref 32–36)
MCV RBC AUTO: 94.9 FL — SIGNIFICANT CHANGE UP (ref 80–100)
NRBC # BLD: 0 /100 WBCS — SIGNIFICANT CHANGE UP (ref 0–0)
NRBC # FLD: 0 K/UL — SIGNIFICANT CHANGE UP (ref 0–0)
PHOSPHATE SERPL-MCNC: 3.6 MG/DL — SIGNIFICANT CHANGE UP (ref 2.5–4.5)
PLATELET # BLD AUTO: 342 K/UL — SIGNIFICANT CHANGE UP (ref 150–400)
POTASSIUM SERPL-MCNC: 3.7 MMOL/L — SIGNIFICANT CHANGE UP (ref 3.5–5.3)
POTASSIUM SERPL-SCNC: 3.7 MMOL/L — SIGNIFICANT CHANGE UP (ref 3.5–5.3)
RBC # BLD: 3.14 M/UL — LOW (ref 4.2–5.8)
RBC # FLD: 18.1 % — HIGH (ref 10.3–14.5)
SODIUM SERPL-SCNC: 146 MMOL/L — HIGH (ref 135–145)
WBC # BLD: 7.67 K/UL — SIGNIFICANT CHANGE UP (ref 3.8–10.5)
WBC # FLD AUTO: 7.67 K/UL — SIGNIFICANT CHANGE UP (ref 3.8–10.5)

## 2024-06-27 RX ADMIN — POLYVINYL ALCOHOL, POVIDONE 1 DROP(S): .5; .6 SOLUTION OPHTHALMIC at 00:24

## 2024-06-27 RX ADMIN — TICAGRELOR 60 MILLIGRAM(S): 90 TABLET ORAL at 23:36

## 2024-06-27 RX ADMIN — VALPROIC ACID 52.5 MILLIGRAM(S): 250 SOLUTION ORAL at 15:16

## 2024-06-27 RX ADMIN — PANTOPRAZOLE SODIUM 40 MILLIGRAM(S): 40 INJECTION, POWDER, FOR SOLUTION INTRAVENOUS at 13:17

## 2024-06-27 RX ADMIN — CARVEDILOL PHOSPHATE 6.25 MILLIGRAM(S): 80 CAPSULE, EXTENDED RELEASE ORAL at 19:41

## 2024-06-27 RX ADMIN — DEXTROSE MONOHYDRATE AND SODIUM CHLORIDE 80 MILLILITER(S): 5; .3 INJECTION, SOLUTION INTRAVENOUS at 21:04

## 2024-06-27 RX ADMIN — POLYVINYL ALCOHOL, POVIDONE 1 DROP(S): .5; .6 SOLUTION OPHTHALMIC at 13:13

## 2024-06-27 RX ADMIN — Medication 1 GRAM(S): at 19:41

## 2024-06-27 RX ADMIN — POLYVINYL ALCOHOL, POVIDONE 1 DROP(S): .5; .6 SOLUTION OPHTHALMIC at 19:40

## 2024-06-27 RX ADMIN — POLYVINYL ALCOHOL, POVIDONE 1 DROP(S): .5; .6 SOLUTION OPHTHALMIC at 23:36

## 2024-06-27 RX ADMIN — ESCITALOPRAM OXALATE 10 MILLIGRAM(S): 20 TABLET, FILM COATED ORAL at 15:16

## 2024-06-27 RX ADMIN — DOXAZOSIN MESYLATE 2 MILLIGRAM(S): 2 TABLET ORAL at 23:35

## 2024-06-27 RX ADMIN — INSULIN LISPRO 2: 100 INJECTION, SOLUTION SUBCUTANEOUS at 12:50

## 2024-06-27 RX ADMIN — VALPROIC ACID 52.5 MILLIGRAM(S): 250 SOLUTION ORAL at 06:00

## 2024-06-27 RX ADMIN — INSULIN LISPRO 2: 100 INJECTION, SOLUTION SUBCUTANEOUS at 06:19

## 2024-06-27 RX ADMIN — ASPIRIN 81 MILLIGRAM(S): 325 TABLET, FILM COATED ORAL at 13:17

## 2024-06-27 RX ADMIN — TORSEMIDE 20 MILLIGRAM(S): 20 TABLET ORAL at 23:36

## 2024-06-27 RX ADMIN — VALPROIC ACID 52.5 MILLIGRAM(S): 250 SOLUTION ORAL at 22:39

## 2024-06-27 RX ADMIN — Medication 3 MILLIGRAM(S): at 23:35

## 2024-06-27 RX ADMIN — POLYVINYL ALCOHOL, POVIDONE 1 DROP(S): .5; .6 SOLUTION OPHTHALMIC at 06:17

## 2024-06-28 ENCOUNTER — TRANSCRIPTION ENCOUNTER (OUTPATIENT)
Age: 89
End: 2024-06-28

## 2024-06-28 LAB
ANION GAP SERPL CALC-SCNC: 13 MMOL/L — SIGNIFICANT CHANGE UP (ref 7–14)
BUN SERPL-MCNC: 34 MG/DL — HIGH (ref 7–23)
CALCIUM SERPL-MCNC: 8.7 MG/DL — SIGNIFICANT CHANGE UP (ref 8.4–10.5)
CHLORIDE SERPL-SCNC: 107 MMOL/L — SIGNIFICANT CHANGE UP (ref 98–107)
CO2 SERPL-SCNC: 25 MMOL/L — SIGNIFICANT CHANGE UP (ref 22–31)
CREAT SERPL-MCNC: 1.26 MG/DL — SIGNIFICANT CHANGE UP (ref 0.5–1.3)
EGFR: 54 ML/MIN/1.73M2 — LOW
GLUCOSE BLDC GLUCOMTR-MCNC: 159 MG/DL — HIGH (ref 70–99)
GLUCOSE BLDC GLUCOMTR-MCNC: 165 MG/DL — HIGH (ref 70–99)
GLUCOSE BLDC GLUCOMTR-MCNC: 271 MG/DL — HIGH (ref 70–99)
GLUCOSE BLDC GLUCOMTR-MCNC: 293 MG/DL — HIGH (ref 70–99)
GLUCOSE SERPL-MCNC: 281 MG/DL — HIGH (ref 70–99)
HCT VFR BLD CALC: 30.2 % — LOW (ref 39–50)
HGB BLD-MCNC: 9.4 G/DL — LOW (ref 13–17)
MAGNESIUM SERPL-MCNC: 2 MG/DL — SIGNIFICANT CHANGE UP (ref 1.6–2.6)
MCHC RBC-ENTMCNC: 29.8 PG — SIGNIFICANT CHANGE UP (ref 27–34)
MCHC RBC-ENTMCNC: 31.1 GM/DL — LOW (ref 32–36)
MCV RBC AUTO: 95.9 FL — SIGNIFICANT CHANGE UP (ref 80–100)
NRBC # BLD: 0 /100 WBCS — SIGNIFICANT CHANGE UP (ref 0–0)
NRBC # FLD: 0 K/UL — SIGNIFICANT CHANGE UP (ref 0–0)
PHOSPHATE SERPL-MCNC: 3.1 MG/DL — SIGNIFICANT CHANGE UP (ref 2.5–4.5)
PLATELET # BLD AUTO: 359 K/UL — SIGNIFICANT CHANGE UP (ref 150–400)
POTASSIUM SERPL-MCNC: 3.6 MMOL/L — SIGNIFICANT CHANGE UP (ref 3.5–5.3)
POTASSIUM SERPL-SCNC: 3.6 MMOL/L — SIGNIFICANT CHANGE UP (ref 3.5–5.3)
RBC # BLD: 3.15 M/UL — LOW (ref 4.2–5.8)
RBC # FLD: 17.7 % — HIGH (ref 10.3–14.5)
SODIUM SERPL-SCNC: 145 MMOL/L — SIGNIFICANT CHANGE UP (ref 135–145)
WBC # BLD: 7.02 K/UL — SIGNIFICANT CHANGE UP (ref 3.8–10.5)
WBC # FLD AUTO: 7.02 K/UL — SIGNIFICANT CHANGE UP (ref 3.8–10.5)

## 2024-06-28 RX ORDER — VALPROIC ACID 250 MG/5ML
250 SOLUTION ORAL THREE TIMES A DAY
Refills: 0 | Status: DISCONTINUED | OUTPATIENT
Start: 2024-06-28 | End: 2024-06-30

## 2024-06-28 RX ADMIN — POLYVINYL ALCOHOL, POVIDONE 1 DROP(S): .5; .6 SOLUTION OPHTHALMIC at 19:12

## 2024-06-28 RX ADMIN — Medication 1 GRAM(S): at 19:12

## 2024-06-28 RX ADMIN — POLYVINYL ALCOHOL, POVIDONE 1 DROP(S): .5; .6 SOLUTION OPHTHALMIC at 22:27

## 2024-06-28 RX ADMIN — TORSEMIDE 40 MILLIGRAM(S): 20 TABLET ORAL at 06:46

## 2024-06-28 RX ADMIN — ESCITALOPRAM OXALATE 10 MILLIGRAM(S): 20 TABLET, FILM COATED ORAL at 12:46

## 2024-06-28 RX ADMIN — INSULIN LISPRO 6: 100 INJECTION, SOLUTION SUBCUTANEOUS at 08:56

## 2024-06-28 RX ADMIN — CARVEDILOL PHOSPHATE 6.25 MILLIGRAM(S): 80 CAPSULE, EXTENDED RELEASE ORAL at 06:46

## 2024-06-28 RX ADMIN — PANTOPRAZOLE SODIUM 40 MILLIGRAM(S): 40 INJECTION, POWDER, FOR SOLUTION INTRAVENOUS at 12:17

## 2024-06-28 RX ADMIN — CARVEDILOL PHOSPHATE 6.25 MILLIGRAM(S): 80 CAPSULE, EXTENDED RELEASE ORAL at 19:12

## 2024-06-28 RX ADMIN — Medication 3 MILLIGRAM(S): at 22:27

## 2024-06-28 RX ADMIN — ASPIRIN 81 MILLIGRAM(S): 325 TABLET, FILM COATED ORAL at 12:18

## 2024-06-28 RX ADMIN — INSULIN GLARGINE 10 UNIT(S): 100 INJECTION, SOLUTION SUBCUTANEOUS at 08:56

## 2024-06-28 RX ADMIN — TICAGRELOR 60 MILLIGRAM(S): 90 TABLET ORAL at 19:12

## 2024-06-28 RX ADMIN — TICAGRELOR 60 MILLIGRAM(S): 90 TABLET ORAL at 06:46

## 2024-06-28 RX ADMIN — Medication 770 MILLIGRAM(S): at 19:14

## 2024-06-28 RX ADMIN — POLYVINYL ALCOHOL, POVIDONE 1 DROP(S): .5; .6 SOLUTION OPHTHALMIC at 12:17

## 2024-06-28 RX ADMIN — TORSEMIDE 20 MILLIGRAM(S): 20 TABLET ORAL at 22:27

## 2024-06-28 RX ADMIN — VALPROIC ACID 52.5 MILLIGRAM(S): 250 SOLUTION ORAL at 15:04

## 2024-06-28 RX ADMIN — DOXAZOSIN MESYLATE 2 MILLIGRAM(S): 2 TABLET ORAL at 22:27

## 2024-06-28 RX ADMIN — Medication 1 GRAM(S): at 06:46

## 2024-06-28 RX ADMIN — VALPROIC ACID 52.5 MILLIGRAM(S): 250 SOLUTION ORAL at 06:58

## 2024-06-28 RX ADMIN — POLYVINYL ALCOHOL, POVIDONE 1 DROP(S): .5; .6 SOLUTION OPHTHALMIC at 06:46

## 2024-06-28 RX ADMIN — INSULIN LISPRO 6: 100 INJECTION, SOLUTION SUBCUTANEOUS at 12:55

## 2024-06-28 RX ADMIN — VALPROIC ACID 250 MILLIGRAM(S): 250 SOLUTION ORAL at 22:26

## 2024-06-29 LAB
GLUCOSE BLDC GLUCOMTR-MCNC: 190 MG/DL — HIGH (ref 70–99)
GLUCOSE BLDC GLUCOMTR-MCNC: 228 MG/DL — HIGH (ref 70–99)
GLUCOSE BLDC GLUCOMTR-MCNC: 231 MG/DL — HIGH (ref 70–99)
GLUCOSE BLDC GLUCOMTR-MCNC: 278 MG/DL — HIGH (ref 70–99)

## 2024-06-29 PROCEDURE — 74176 CT ABD & PELVIS W/O CONTRAST: CPT | Mod: 26

## 2024-06-29 RX ADMIN — VALPROIC ACID 250 MILLIGRAM(S): 250 SOLUTION ORAL at 06:15

## 2024-06-29 RX ADMIN — POLYVINYL ALCOHOL, POVIDONE 1 DROP(S): .5; .6 SOLUTION OPHTHALMIC at 21:46

## 2024-06-29 RX ADMIN — VALPROIC ACID 250 MILLIGRAM(S): 250 SOLUTION ORAL at 21:45

## 2024-06-29 RX ADMIN — INSULIN LISPRO 6: 100 INJECTION, SOLUTION SUBCUTANEOUS at 08:03

## 2024-06-29 RX ADMIN — PANTOPRAZOLE SODIUM 40 MILLIGRAM(S): 40 INJECTION, POWDER, FOR SOLUTION INTRAVENOUS at 17:32

## 2024-06-29 RX ADMIN — TORSEMIDE 20 MILLIGRAM(S): 20 TABLET ORAL at 21:45

## 2024-06-29 RX ADMIN — INSULIN LISPRO 4: 100 INJECTION, SOLUTION SUBCUTANEOUS at 17:33

## 2024-06-29 RX ADMIN — INSULIN LISPRO 4: 100 INJECTION, SOLUTION SUBCUTANEOUS at 12:51

## 2024-06-29 RX ADMIN — Medication 770 MILLIGRAM(S): at 16:48

## 2024-06-29 RX ADMIN — POLYVINYL ALCOHOL, POVIDONE 1 DROP(S): .5; .6 SOLUTION OPHTHALMIC at 17:32

## 2024-06-29 RX ADMIN — Medication 770 MILLIGRAM(S): at 14:59

## 2024-06-29 RX ADMIN — Medication 770 MILLIGRAM(S): at 07:30

## 2024-06-29 RX ADMIN — CARVEDILOL PHOSPHATE 6.25 MILLIGRAM(S): 80 CAPSULE, EXTENDED RELEASE ORAL at 17:32

## 2024-06-29 RX ADMIN — Medication 3 MILLIGRAM(S): at 21:45

## 2024-06-29 RX ADMIN — ESCITALOPRAM OXALATE 10 MILLIGRAM(S): 20 TABLET, FILM COATED ORAL at 14:58

## 2024-06-29 RX ADMIN — Medication 1 GRAM(S): at 17:32

## 2024-06-29 RX ADMIN — DOXAZOSIN MESYLATE 2 MILLIGRAM(S): 2 TABLET ORAL at 21:45

## 2024-06-29 RX ADMIN — VALPROIC ACID 250 MILLIGRAM(S): 250 SOLUTION ORAL at 14:59

## 2024-06-29 RX ADMIN — TICAGRELOR 60 MILLIGRAM(S): 90 TABLET ORAL at 17:32

## 2024-06-29 RX ADMIN — TORSEMIDE 40 MILLIGRAM(S): 20 TABLET ORAL at 06:16

## 2024-06-29 RX ADMIN — POLYVINYL ALCOHOL, POVIDONE 1 DROP(S): .5; .6 SOLUTION OPHTHALMIC at 13:38

## 2024-06-29 RX ADMIN — ASPIRIN 81 MILLIGRAM(S): 325 TABLET, FILM COATED ORAL at 15:00

## 2024-06-29 RX ADMIN — POLYVINYL ALCOHOL, POVIDONE 1 DROP(S): .5; .6 SOLUTION OPHTHALMIC at 06:16

## 2024-06-29 RX ADMIN — Medication 1 GRAM(S): at 06:15

## 2024-06-29 RX ADMIN — CARVEDILOL PHOSPHATE 6.25 MILLIGRAM(S): 80 CAPSULE, EXTENDED RELEASE ORAL at 06:16

## 2024-06-29 RX ADMIN — Medication 770 MILLIGRAM(S): at 07:00

## 2024-06-29 RX ADMIN — TICAGRELOR 60 MILLIGRAM(S): 90 TABLET ORAL at 06:16

## 2024-06-29 RX ADMIN — INSULIN GLARGINE 10 UNIT(S): 100 INJECTION, SOLUTION SUBCUTANEOUS at 08:02

## 2024-06-30 VITALS
RESPIRATION RATE: 16 BRPM | DIASTOLIC BLOOD PRESSURE: 58 MMHG | TEMPERATURE: 98 F | HEART RATE: 84 BPM | OXYGEN SATURATION: 99 % | SYSTOLIC BLOOD PRESSURE: 110 MMHG

## 2024-06-30 LAB
ANION GAP SERPL CALC-SCNC: 15 MMOL/L — HIGH (ref 7–14)
BUN SERPL-MCNC: 47 MG/DL — HIGH (ref 7–23)
CALCIUM SERPL-MCNC: 9 MG/DL — SIGNIFICANT CHANGE UP (ref 8.4–10.5)
CHLORIDE SERPL-SCNC: 103 MMOL/L — SIGNIFICANT CHANGE UP (ref 98–107)
CO2 SERPL-SCNC: 27 MMOL/L — SIGNIFICANT CHANGE UP (ref 22–31)
CREAT SERPL-MCNC: 1.41 MG/DL — HIGH (ref 0.5–1.3)
EGFR: 47 ML/MIN/1.73M2 — LOW
GLUCOSE BLDC GLUCOMTR-MCNC: 175 MG/DL — HIGH (ref 70–99)
GLUCOSE BLDC GLUCOMTR-MCNC: 207 MG/DL — HIGH (ref 70–99)
GLUCOSE BLDC GLUCOMTR-MCNC: 312 MG/DL — HIGH (ref 70–99)
GLUCOSE SERPL-MCNC: 281 MG/DL — HIGH (ref 70–99)
HCT VFR BLD CALC: 30.5 % — LOW (ref 39–50)
HGB BLD-MCNC: 10.1 G/DL — LOW (ref 13–17)
MAGNESIUM SERPL-MCNC: 2.1 MG/DL — SIGNIFICANT CHANGE UP (ref 1.6–2.6)
MCHC RBC-ENTMCNC: 31.5 PG — SIGNIFICANT CHANGE UP (ref 27–34)
MCHC RBC-ENTMCNC: 33.1 GM/DL — SIGNIFICANT CHANGE UP (ref 32–36)
MCV RBC AUTO: 95 FL — SIGNIFICANT CHANGE UP (ref 80–100)
NRBC # BLD: 0 /100 WBCS — SIGNIFICANT CHANGE UP (ref 0–0)
NRBC # FLD: 0 K/UL — SIGNIFICANT CHANGE UP (ref 0–0)
PHOSPHATE SERPL-MCNC: 3.3 MG/DL — SIGNIFICANT CHANGE UP (ref 2.5–4.5)
PLATELET # BLD AUTO: 349 K/UL — SIGNIFICANT CHANGE UP (ref 150–400)
POTASSIUM SERPL-MCNC: 4.1 MMOL/L — SIGNIFICANT CHANGE UP (ref 3.5–5.3)
POTASSIUM SERPL-SCNC: 4.1 MMOL/L — SIGNIFICANT CHANGE UP (ref 3.5–5.3)
RBC # BLD: 3.21 M/UL — LOW (ref 4.2–5.8)
RBC # FLD: 17.8 % — HIGH (ref 10.3–14.5)
SODIUM SERPL-SCNC: 145 MMOL/L — SIGNIFICANT CHANGE UP (ref 135–145)
WBC # BLD: 8.62 K/UL — SIGNIFICANT CHANGE UP (ref 3.8–10.5)
WBC # FLD AUTO: 8.62 K/UL — SIGNIFICANT CHANGE UP (ref 3.8–10.5)

## 2024-06-30 RX ADMIN — POLYVINYL ALCOHOL, POVIDONE 1 DROP(S): .5; .6 SOLUTION OPHTHALMIC at 06:08

## 2024-06-30 RX ADMIN — INSULIN GLARGINE 10 UNIT(S): 100 INJECTION, SOLUTION SUBCUTANEOUS at 07:51

## 2024-06-30 RX ADMIN — VALPROIC ACID 250 MILLIGRAM(S): 250 SOLUTION ORAL at 14:07

## 2024-06-30 RX ADMIN — TICAGRELOR 60 MILLIGRAM(S): 90 TABLET ORAL at 06:08

## 2024-06-30 RX ADMIN — PANTOPRAZOLE SODIUM 40 MILLIGRAM(S): 40 INJECTION, POWDER, FOR SOLUTION INTRAVENOUS at 14:07

## 2024-06-30 RX ADMIN — INSULIN LISPRO 4: 100 INJECTION, SOLUTION SUBCUTANEOUS at 16:22

## 2024-06-30 RX ADMIN — ESCITALOPRAM OXALATE 10 MILLIGRAM(S): 20 TABLET, FILM COATED ORAL at 14:06

## 2024-06-30 RX ADMIN — INSULIN LISPRO 8: 100 INJECTION, SOLUTION SUBCUTANEOUS at 08:02

## 2024-06-30 RX ADMIN — Medication 770 MILLIGRAM(S): at 14:07

## 2024-06-30 RX ADMIN — Medication 1 GRAM(S): at 06:08

## 2024-06-30 RX ADMIN — POLYVINYL ALCOHOL, POVIDONE 1 DROP(S): .5; .6 SOLUTION OPHTHALMIC at 12:29

## 2024-06-30 RX ADMIN — VALPROIC ACID 250 MILLIGRAM(S): 250 SOLUTION ORAL at 06:08

## 2024-06-30 RX ADMIN — CARVEDILOL PHOSPHATE 6.25 MILLIGRAM(S): 80 CAPSULE, EXTENDED RELEASE ORAL at 06:08

## 2024-06-30 RX ADMIN — TORSEMIDE 40 MILLIGRAM(S): 20 TABLET ORAL at 06:08

## 2024-06-30 RX ADMIN — ASPIRIN 81 MILLIGRAM(S): 325 TABLET, FILM COATED ORAL at 14:07

## 2024-06-30 RX ADMIN — INSULIN LISPRO 2: 100 INJECTION, SOLUTION SUBCUTANEOUS at 12:33

## 2024-06-30 RX ADMIN — Medication 770 MILLIGRAM(S): at 15:14

## 2024-07-01 LAB
CULTURE RESULTS: SIGNIFICANT CHANGE UP
SPECIMEN SOURCE: SIGNIFICANT CHANGE UP

## 2024-07-02 LAB
CULTURE RESULTS: SIGNIFICANT CHANGE UP
SPECIMEN SOURCE: SIGNIFICANT CHANGE UP

## 2024-07-22 NOTE — ED PROVIDER NOTE - CARE PLAN
Please go to emergency department if feeling as though you are a harm to yourself or others or if you are in crisis. Please call the clinic to report any worsening of symptoms or problems associated with medication.       Principal Discharge DX:	Shortness of breath   1

## 2024-07-25 ENCOUNTER — TRANSCRIPTION ENCOUNTER (OUTPATIENT)
Age: 89
End: 2024-07-25

## 2024-08-01 ENCOUNTER — OUTPATIENT (OUTPATIENT)
Dept: OUTPATIENT SERVICES | Facility: HOSPITAL | Age: 89
LOS: 1 days | End: 2024-08-01

## 2024-08-01 VITALS
RESPIRATION RATE: 14 BRPM | TEMPERATURE: 97 F | DIASTOLIC BLOOD PRESSURE: 73 MMHG | SYSTOLIC BLOOD PRESSURE: 121 MMHG | OXYGEN SATURATION: 100 % | HEART RATE: 93 BPM

## 2024-08-01 VITALS
TEMPERATURE: 97 F | RESPIRATION RATE: 14 BRPM | DIASTOLIC BLOOD PRESSURE: 71 MMHG | OXYGEN SATURATION: 100 % | HEART RATE: 91 BPM | SYSTOLIC BLOOD PRESSURE: 122 MMHG

## 2024-08-01 DIAGNOSIS — Z95.0 PRESENCE OF CARDIAC PACEMAKER: Chronic | ICD-10-CM

## 2024-08-01 DIAGNOSIS — K81.9 CHOLECYSTITIS, UNSPECIFIED: ICD-10-CM

## 2024-08-01 DIAGNOSIS — Z98.89 OTHER SPECIFIED POSTPROCEDURAL STATES: Chronic | ICD-10-CM

## 2024-08-01 DIAGNOSIS — Z95.5 PRESENCE OF CORONARY ANGIOPLASTY IMPLANT AND GRAFT: Chronic | ICD-10-CM

## 2024-08-01 PROCEDURE — 47537 REMOVAL BILIARY DRG CATH: CPT

## 2024-08-07 NOTE — ED ADULT NURSE NOTE - NSFALLUNIVINTERV_ED_ALL_ED
Bed/Stretcher in lowest position, wheels locked, appropriate side rails in place/Call bell, personal items and telephone in reach/Instruct patient to call for assistance before getting out of bed/chair/stretcher/Non-slip footwear applied when patient is off stretcher/[place] to call system/Physically safe environment - no spills, clutter or unnecessary equipment/Purposeful proactive rounding/Room/bathroom lighting operational, light cord in reach

## 2024-08-07 NOTE — H&P ADULT - PROBLEM SELECTOR PLAN 5
hyperglycemic  continue basal insulin, ISS, hypoglycemia protocol  monitor glucose and titrate insuline regimens as needed

## 2024-08-07 NOTE — ED ADULT NURSE NOTE - OBJECTIVE STATEMENT
89Y M PMH HTN, DM, with peg tube. per son noted pt had expressive aphasia about an hour before coming into ED. pt normally A&Ox3 at baseline but is now A&Ox1. pt normally more responsive. in ED, pt lethargic, responsive to painful/verbal stimuli. [id number] mg Tylenol throughout peg tube , 300 FS 10 units of lantus. pt with 2 stage II ulcers to sacrum. pt febrile initally in ED but temp resolved itself.

## 2024-08-07 NOTE — H&P ADULT - NSHPLABSRESULTS_GEN_ALL_CORE
ECHO 03/2024  Left ventricular systolic function is mildly to moderately decreased with an ejection fraction of 45 % by Florence's method of disks. Global left ventricular hypokinesis

## 2024-08-07 NOTE — H&P ADULT - PROBLEM SELECTOR PLAN 3
UA appear dirty. Unable to get history from patient  Continue ceftriaxone for a total of 3 days  Check urine culture

## 2024-08-07 NOTE — H&P ADULT - ASSESSMENT
89 years old male with h/o HTN, HLD, DM, HFrEF, CAD s/p CABG s/p stents ( last stent in 05/2022), s/p PPM, CKD, PVD, h/o CVA x 3, s/p PEG, acute cholecystitis s/p cholecystostomy ( not surgical candidate, s/p cholecystomy tube removal ? 8/1) was brought in to ED with decreased responsiveness. Patient is bedbound, minimally verbal, A&OX1 baseline. Patient was noted to be unresponsive last night.   Had episode of low BP, sat well at RA, afebrile. No leukocytosis, plt 283, lactate 3.7---> 2.8, Cr 2.65 ( 1.58 in 07/2024), CT chest/abd/pelvis with no acute pathology. Stable bilateral pleural effusions and comrpessive atelectasis. CT head with no acute pathology. No significant change compared with 3/16/2024.

## 2024-08-07 NOTE — ED PROVIDER NOTE - CLINICAL SUMMARY MEDICAL DECISION MAKING FREE TEXT BOX
Patient with hx of mild dementia A&O x 3 with forgetfulmess chair bound at baseline with 3 days of generalized weakness decrease activity and speech and 30 minutes of what family believed was aphasia just piror to arrival.  Temp 100.6.  Possible sepsis.  DDx inlcudes UTI, PNA, covid/flu, cholecystitis.  Will give IVF, anti-pyretics, check labs, UA, CXR, EKG and Admit.  Low suspicion for CVA patient has no focal deficits and following commands.  Labs do not indicate transaminitis.  CXR negative.  UA negative.    MABLE noted.  Patient admitted to telemetry.

## 2024-08-07 NOTE — H&P ADULT - CONVERSATION DETAILS
Called [phone], talked to patient's 2nd son Lázaro. He stated that he is HCP. Patient has 4 sons and 1 daughter. Informed son to bring in copy of HCP to hospital. Discussed with GOC. Son is not decided yet but is opened to conversation with palliative team. Palliative care consulted for GOC discussion

## 2024-08-07 NOTE — ED ADULT NURSE NOTE - ED STAT RN HANDOFF DETAILS
report given to ED Hold Braeden MCCORMICK. patient alert and oriented x1, bedbound, with delong catheter, PEG tube and pressure injury. Son at bedside.

## 2024-08-07 NOTE — H&P ADULT - NSHPREVIEWOFSYSTEMS_GEN_ALL_CORE
Unable to obtain ROS due to dementia/minimally verbal at baseline. Brought in with decrease responsiveness

## 2024-08-07 NOTE — ED PROVIDER NOTE - CARE PLAN
Principal Discharge DX:	AMS (altered mental status)  Secondary Diagnosis:	Generalized weakness  Secondary Diagnosis:	MABLE (acute kidney injury)   1

## 2024-08-07 NOTE — H&P ADULT - PROBLEM SELECTOR PLAN 2
Cr 2.65 ( 1.58 in 07/2024), significantly elevated BUN, appear dry clinically  Likely pre-renal  Resume tube feed  Gentle IV hydration for 1 days. Closely monitor for volume overload due to HFrEF  Hold diuretics, avoid nephrotoxic substances  closely monitor renal function  Nutrition consult

## 2024-08-07 NOTE — H&P ADULT - PROBLEM SELECTOR PLAN 4
Likely due to MABLE on CKD  serial trop/CK/CKMB, telemetry monitoring  on DAPT  Not a candidate for invasive intervention

## 2024-08-07 NOTE — ED PROVIDER NOTE - OBJECTIVE STATEMENT
This patient is a 89 year old man hx of mild demential as per son without formal diagnosis who presents to the ER with reports of generalized weakness and AMS.  Patient has had 3 days of generalized weakness that seemed much worse today.As per EMR and family the patient had a cholecystotomy tube removed 13 days ago.  He had been doing well until recently and has just been getting weaker.  He is chair bound at baseline.  Patient became too weak to do physical therapy and today not engaging or speaking as much.  His son at bedside states that the patient continued in the way but he became more concerned just prior to arrival because the patient seemed aphasic and not speaking at all for about 30 minutes but he is now with minimal speech often mumbled speech as he has been the past day.  He was reported to have a fever and was given tylenol via PEG tube prior to arrival.      : Patient's son's

## 2024-08-07 NOTE — H&P ADULT - PROBLEM SELECTOR PLAN 1
brought in with decreased/unresponsiveness  CT head  ( I personally review) with no acute pathology. No significant change compared with 3/16/2024  UA dirty. Cr 2.65 ( 1.58 in 07/2024)  Likely metabolic encephalopathy due to MABLE on CKD, UTI  Ceftriaxone for UTI  Gentle IV hydration for MABLE  Palliative care consulted for GOC discussion

## 2024-08-07 NOTE — H&P ADULT - NSHPPHYSICALEXAM_GEN_ALL_CORE
CONSTITUTIONAL: demented, no acute distress  EYES: PERRL,  no scleral icterus  ENT: Mucosa moist, tongue normal.  NECK: Neck supple, trachea midline, non-tender  CARDIAC: Normal S1 and S2. Regular rate and rhythms. No Pedal edema  LUNGS: Equal air entry both lungs. No rales, rhonchi, wheezing. Normal respiratory effort.   ABDOMEN: Slightly distended. No guarding or rebound tenderness. No hepatomegaly or splenomegaly. Bowel sound normal. No hernia noted  MUSCULOSKELETAL: Normocephalic, atraumatic. No significant deformity or joint abnormality  NEUROLOGICAL: No gross motor or sensory deficits  SKIN: no lesions or eruptions. Normal turgor  PSYCHIATRIC: A&O x 3, appropriate mood and affect

## 2024-08-07 NOTE — ED ADULT NURSE NOTE - CHIEF COMPLAINT QUOTE
per son noted pt had expressive aphasia 1hr PTA to arrival to ED pt baseline A & O x 3 now pt A &O x 1. pt [id number] mg Tylenol throughout peg tube , given high 300 given 10 units of lantus.

## 2024-08-07 NOTE — PATIENT PROFILE ADULT - FALL HARM RISK - HARM RISK INTERVENTIONS

## 2024-08-07 NOTE — ED ADULT TRIAGE NOTE - CHIEF COMPLAINT QUOTE
per son noted pt had expressive aphasia 1hr PTA to arrival to ED pt baseline A & O x 3 now pt A &O x 1. pt [id number] mg Tylenol throughout peg tube , given high 300 given 10 units of lantus.

## 2024-08-08 NOTE — PROGRESS NOTE ADULT - SUBJECTIVE AND OBJECTIVE BOX
HPI:  89 years old male with h/o HTN, HLD, DM, HFrEF, CAD s/p CABG s/p stents ( last stent in 05/2022), s/p PPM, CKD, PVD, h/o CVA x 3, s/p PEG, acute cholecystitis s/p cholecystostomy ( not surgical candidate, s/p cholecystomy tube removal ? 8/1) was brought in to ED with decreased responsiveness. Patient is bedbound, minimally verbal, A&OX1 baseline. Patient was noted to be unresponsive last night.   Had episode of low BP, sat well at RA, afebrile. No leukocytosis, plt 283, lactate 3.7---> 2.8, Cr 2.65 ( 1.58 in 07/2024), CT chest/abd/pelvis with no acute pathology. Stable bilateral pleural effusions and comrpessive atelectasis. CT head with no acute pathology. No significant change compared with 3/16/2024. (07 Aug 2024 11:32)  Patient is a 89y old  Male who presents with a chief complaint of AMS, MABLE on CKD, UTI (07 Aug 2024 11:32)      INTERVAL HPI/OVERNIGHT EVENTS: new admission no acutre events     MEDICATIONS  (STANDING):  artificial  tears Solution 1 Drop(s) Both EYES four times a day  aspirin  chewable 81 milliGRAM(s) Oral daily  atorvastatin 20 milliGRAM(s) Oral at bedtime  carvedilol 6.25 milliGRAM(s) Oral every 12 hours  cefTRIAXone   IVPB 1000 milliGRAM(s) IV Intermittent every 24 hours  dextrose 5%. 1000 milliLiter(s) (50 mL/Hr) IV Continuous <Continuous>  dextrose 5%. 1000 milliLiter(s) (100 mL/Hr) IV Continuous <Continuous>  dextrose 50% Injectable 25 Gram(s) IV Push once  dextrose 50% Injectable 12.5 Gram(s) IV Push once  dextrose 50% Injectable 25 Gram(s) IV Push once  doxazosin 2 milliGRAM(s) Oral at bedtime  enoxaparin Injectable 30 milliGRAM(s) SubCutaneous every 24 hours  escitalopram 10 milliGRAM(s) Oral daily  glucagon  Injectable 1 milliGRAM(s) IntraMuscular once  insulin glargine Injectable (LANTUS) 10 Unit(s) SubCutaneous at bedtime  insulin lispro (ADMELOG) corrective regimen sliding scale   SubCutaneous three times a day before meals  lactated ringers. 1000 milliLiter(s) (100 mL/Hr) IV Continuous <Continuous>  pantoprazole   Suspension 40 milliGRAM(s) Oral daily  ticagrelor 60 milliGRAM(s) Enteral Tube two times a day  valproic  acid Syrup 125 milliGRAM(s) Oral three times a day    MEDICATIONS  (PRN):  acetaminophen   Oral Liquid .. 650 milliGRAM(s) Oral every 6 hours PRN Temp greater or equal to 38C (100.4F), Mild Pain (1 - 3), Moderate Pain (4 - 6)  dextrose Oral Gel 15 Gram(s) Oral once PRN Blood Glucose LESS THAN 70 milliGRAM(s)/deciliter  ondansetron Injectable 4 milliGRAM(s) IV Push every 8 hours PRN Nausea and/or Vomiting      Allergies    Cipro (Unknown)  carbamazepine (Other)  fluoroquinolone antibiotics (Other)  Tegretol (Unknown)    Intolerances        REVIEW OF SYSTEMS:  unable to provide     Vital Signs Last 24 Hrs  T(C): 37.4 (08 Aug 2024 10:47), Max: 37.4 (08 Aug 2024 10:47)  T(F): 99.4 (08 Aug 2024 10:47), Max: 99.4 (08 Aug 2024 10:47)  HR: 84 (08 Aug 2024 13:01) (75 - 87)  BP: 108/54 (08 Aug 2024 10:47) (107/64 - 111/78)  BP(mean): 70 (07 Aug 2024 19:27) (70 - 70)  RR: 18 (08 Aug 2024 10:47) (18 - 19)  SpO2: 99% (08 Aug 2024 10:47) (94% - 100%)    Parameters below as of 08 Aug 2024 10:47  Patient On (Oxygen Delivery Method): room air        PHYSICAL EXAM:  CONSTITUTIONAL: demented, no acute distress  EYES: PERRL,  no scleral icterus  ENT: Mucosa moist, tongue normal.  NECK: Neck supple, trachea midline, non-tender  CARDIAC: Normal S1 and S2. Regular rate and rhythms. No Pedal edema  LUNGS: Equal air entry both lungs. No rales, rhonchi, wheezing. Normal respiratory effort.   ABDOMEN: Slightly distended. No guarding or rebound tenderness. No hepatomegaly or splenomegaly. Bowel sound normal. No hernia noted  MUSCULOSKELETAL: Normocephalic, atraumatic. No significant deformity or joint abnormality  NEUROLOGICAL: No gross motor or sensory deficits  SKIN: no lesions or eruptions. Normal turgor  PSYCHIATRIC: A&O x 3, appropriate mood and affect  LABS:                        7.1    7.02  )-----------( 300      ( 08 Aug 2024 07:20 )             23.3     08-08    144  |  111<H>  |  86<H>  ----------------------------<  212<H>  4.2   |  24  |  1.99<H>    Ca    8.7      08 Aug 2024 07:20  Phos  3.3     08-08  Mg     2.9     08-08    TPro  7.3  /  Alb  1.8<L>  /  TBili  0.2  /  DBili  x   /  AST  23  /  ALT  17  /  AlkPhos  104  08-08    PT/INR - ( 07 Aug 2024 03:00 )   PT: 12.0 sec;   INR: 1.01 ratio         PTT - ( 07 Aug 2024 03:00 )  PTT:24.0 sec  Urinalysis Basic - ( 08 Aug 2024 07:20 )    Color: x / Appearance: x / SG: x / pH: x  Gluc: 212 mg/dL / Ketone: x  / Bili: x / Urobili: x   Blood: x / Protein: x / Nitrite: x   Leuk Esterase: x / RBC: x / WBC x   Sq Epi: x / Non Sq Epi: x / Bacteria: x      CAPILLARY BLOOD GLUCOSE      POCT Blood Glucose.: 288 mg/dL (08 Aug 2024 16:33)  POCT Blood Glucose.: 266 mg/dL (08 Aug 2024 11:18)  POCT Blood Glucose.: 215 mg/dL (08 Aug 2024 05:47)  POCT Blood Glucose.: 225 mg/dL (08 Aug 2024 00:31)  POCT Blood Glucose.: 264 mg/dL (07 Aug 2024 21:33)  POCT Blood Glucose.: 353 mg/dL (07 Aug 2024 18:04)      RADIOLOGY & ADDITIONAL TESTS:    Imaging Personally Reviewed:  [ ] YES  [ ] NO    Consultant(s) Notes Reviewed:  [ ] YES  [ ] NO    Care Discussed with Consultants/Other Providers [ ] YES  [ ] NO

## 2024-08-08 NOTE — CONSULT NOTE ADULT - PROBLEM SELECTOR RECOMMENDATION 4
in the context of chronic illness  severe muscle mass wasting with severe subcutaneous fat store depletion  dependant for enteral feeds  continue Glucerna 60cc/hr

## 2024-08-08 NOTE — PHYSICAL THERAPY INITIAL EVALUATION ADULT - NSPTDMEREC_GEN_A_CORE
pt owns WC, pt would benefit from hospital bed and matthew lift to aid performing ADL and basic transfers to allow improved QOL and reduce caregiver burden

## 2024-08-08 NOTE — CONSULT NOTE ADULT - PROBLEM SELECTOR RECOMMENDATION 2
Cr 2.65 ( 1.58 in 07/2024),   elevated BUN 86 on admission, 85 today following hydration  appear dry clinically  Likely pre-renal  Gentle IV hydration for 1 days. Closely monitor for volume overload due to HFrEF  Hold diuretics, avoid nephrotoxic substances  closely monitor renal function

## 2024-08-08 NOTE — PHYSICAL THERAPY INITIAL EVALUATION ADULT - PERTINENT HX OF CURRENT PROBLEM, REHAB EVAL
Per H&P: 89 years old male with h/o HTN, HLD, DM, HFrEF, CAD s/p CABG s/p stents ( last stent in 05/2022), s/p PPM, CKD, PVD, h/o CVA x 3, s/p PEG, acute cholecystitis s/p cholecystostomy ( not surgical candidate, s/p cholecystomy tube removal ? 8/1) was brought in to ED with decreased responsiveness. Patient is bedbound, minimally verbal, A&OX1 baseline. Patient was noted to be unresponsive last night.   Had episode of low BP, sat well at RA, afebrile. No leukocytosis, plt 283, lactate 3.7---> 2.8, Cr 2.65 ( 1.58 in 07/2024), CT chest/abd/pelvis with no acute pathology. Stable bilateral pleural effusions and comrpessive atelectasis. CT head with no acute pathology. No significant change compared with 3/16/2024.

## 2024-08-08 NOTE — CONSULT NOTE ADULT - CONVERSATION DETAILS
Met with  pt son Lázaro Estrella  in hospital family room, Introduced service and  Palliative medicines  team's role in assisting w complex decision making, communication and support.  Discussed her current clinical condition and further goals of care.     Son states that he is pt HCP, will bring form to hospital tomorrow. Pt  Pt was born in [place]. Speaks both English and Kinyarwanda, but has been reverting to his Kinyarwanda of recent given his increasing confusion. Pt  is , wife  3 weeks ago. He has 5 children, and a large extended family, many who are in healthcare related jobs. Pt lives with his son and sons wife Mario Go who is pt PMD. 2 grandchildren live in home. Son states that pt has been approved for 50 hrs of HA coverage weekly which was to start last week. He is hoping that hours will start once pt is D/C from this hospitalization     At baseline, pt is oriented x 2, OOB to chair daily with 2-3 A, non weight bearing. Family providing for  PROM exercises as charity  Pt is continent of bowel and bladder, requires considerable assist with ADL's, dependent for IADL's.    Discussed pt clinical picture over past year. Per son, Pt was admitted to [facility] from  DEC 2023- 2024  Presented with COVID complicated by sepsis second to acute asa and worsening respiratory failure second to pulmonary edema requiring intubation. - Prolonged intubation and s/p tracheostomy ()  further complicated when CTA revealed  mesenteric fat stranding associated with ascending/transverse colon. -Diagnostic laparoscopy () with small bowel and visible colon viable, some inflammation of omentum in RUQ - SMA Angiogram and stent placed (). Pt was D/C to [place] at he end of 2024 was decannulated and weaned off vent support within a month In May pt was admitted back to [facility] for cholecystitis s/p Perc asa, now removed. In July pt was readmitted to [facility] with purulent drainage from around PEG tube site, and bilious drainage from around cholecystostomy catheter site and 1 day of PEG tube occlusion. tx with ABT and d/c home     Discussed advance directives, discussed risks/benefits of LST such as CPR, intubation, and HD  in the context of multiple comorbidities. and advancing vascular dementia Son acknowledges that his fathers health has declined significantly over the past year. He is having difficulty making decisions for treatment moving forward.  He is having difficulty signing DNR/DNI as he feels that his father is still strong and could likely tolerate "more" Discussed QOL vs quantity of life States that he had a superficial discussion re: pt wishes and wishes that he had delved further at that time. He asks that further discussion with other family members take place before decisions are solidified. Will arrange for some time Monday when all are available         Son  was made aware  that pt meets Medicare criteria and is eligible to receive hospice services if that is within family [facility]  Explained hospice as a Medicare benefit designed to assist families with an additional layer of care with the goal of providing best symptom management and QOL at home for the duration of ones life. Reviewed hospice philosophy and services Met with  pt son Lázaro Estrella  in hospital family room, Introduced service and  Palliative medicines  team's role in assisting w complex decision making, communication and support.  Discussed her current clinical condition and further goals of care.     Son states that he is pt HCP, will bring form to hospital tomorrow. Pt  Pt was born in [place]. Speaks both English and Amharic, but has been reverting to his Amharic of recent given his increasing confusion. Pt  is , wife  3 weeks ago. He has 5 children, and a large extended family, many who are in healthcare related jobs. Pt lives with his son and sons wife Mario Go who is pt PMD. 2 grandchildren live in home. Son states that pt has been approved for 50 hrs of HA coverage weekly which was to start last week. He is hoping that hours will start once pt is D/C from this hospitalization     At baseline, pt is oriented x 2, OOB to chair daily with 2-3 A, non weight bearing. Family providing for  PROM exercises as charity  Pt is continent of bowel and bladder, requires considerable assist with ADL's, dependent for IADL's.    Discussed pt clinical picture over past year. Per son, Pt was admitted to [facility] from  DEC 2023- 2024  Presented with COVID complicated by sepsis 2/2 to acute asa and worsening respiratory failure 2/2 to pulmonary edema requiring intubation. - Prolonged intubation and s/p tracheostomy ()  further complicated when CTA revealed  mesenteric fat stranding associated with ascending/transverse colon. -Diagnostic laparoscopy () with small bowel and visible colon viable, some inflammation of omentum in RUQ - SMA Angiogram and stent placed (). Pt was D/C to [place] at he end of 2024 was decannulated and weaned off vent support within a month In May pt was admitted back to [facility] for cholecystitis s/p Perc asa, now removed. In July pt was readmitted to [facility] with purulent drainage from around PEG tube site, and bilious drainage from around cholecystostomy catheter site and 1 day of PEG tube occlusion. tx with ABT and d/c home     Discussed advance directives, discussed risks/benefits of LST such as CPR, intubation, and HD  in the context of multiple comorbidities. and advancing vascular dementia Son acknowledges that his fathers health has declined significantly over the past year. He is having difficulty making decisions for treatment moving forward.  He is having difficulty signing DNR/DNI as he feels that his father is still strong and could likely tolerate "more" Discussed QOL vs quantity of life States that he had a superficial discussion re: pt wishes and wishes that he had delved further at that time. He asks that further discussion with other family members take place before decisions are solidified. Will arrange for some time Monday when all are available         Son  was made aware  that pt meets Medicare criteria and is eligible to receive hospice services if that is within family [facility]  Explained hospice as a Medicare benefit designed to assist families with an additional layer of care with the goal of providing best symptom management and QOL at home for the duration of ones life. Reviewed hospice philosophy and services

## 2024-08-08 NOTE — PHYSICAL THERAPY INITIAL EVALUATION ADULT - LEVEL OF INDEPENDENCE: SUPINE/SIT, REHAB EVAL
Detail Level: Simple Include Location In Plan?: Yes Hide Include Location In Plan Question?: No Detail Level: Zone maximum assist (25% patients effort)

## 2024-08-08 NOTE — PROVIDER CONTACT NOTE (EICU) - BACKGROUND
was e alerted by bedside team, just brought in post cardiac arrest intubated  89y year old male with HTN, HLD, DM, HFrEF, CAD s/p CABG s/p stents ( last 05/2022), s/p PPM, CKD, PVD, h/o CVA x 3, s/p PEG, acute cholecystitis s/p cholecystostomy ( not surgical candidate, s/p recent perc asa tube removal) was brought in to ED with decreased responsiveness. Patient is bedbound, minimally verbal, A&OX1 baseline. Admitted for metabolic encephalopathy due to MABLE on CKD and UTI.  today had  PEA ardiac arrest, currently on levo in ICU, minimal mental status as per team     pt currently on levo .15 mcg/kg/min with map 61, advised to go up on pressors  bedside team to come assess for better IV access

## 2024-08-08 NOTE — CONSULT NOTE ADULT - PROBLEM SELECTOR RECOMMENDATION 3
UA dirty   Culture: <10,000 CFU/mL Normal Urogenital Aurelia  Febrile T Max 100.6  complete empiric 3 day course of ceftriaxone   Tylenol prn for fever

## 2024-08-08 NOTE — CHART NOTE - NSCHARTNOTEFT_GEN_A_CORE
Code Blue Note    Code blue called on this 89yo M patient for PEA. Patient was seen and examined at the bedside by the code team and ICU PA. ACLS protocol initiated. See code sheet documentation.    Hx obtained from son (neurologist) at bedside who was present throughout the code and primary RN. Son states pt was awake, alert and talking at baseline throughout the day. RN reports pt had been cleaned by staff, suddenly became unresponsive with respiratory arrest --> code blue called. Full Code status confirmed by son at bedside.    Physical Exam:  General: unresponsive  Neurology: Unable to assess due to mental status   Respiratory: Intubated, course breath sounds  tube feed visualized on scope, suctioned  CV: PEA  Abdominal: +distended, peg tube  Extremities:   no edema    Assessment/Plan:    This is a 89yo M h/o HTN, HLD, DM, HFrEF, CAD s/p CABG s/p stents ( last 05/2022), s/p PPM, CKD, PVD, h/o CVA x 3, s/p PEG, acute cholecystitis s/p cholecystostomy ( not surgical candidate, s/p recent perc asa tube removal) was brought in to ED with decreased responsiveness. Patient is bedbound, minimally verbal, A&OX1 baseline. Admitted for metabolic encephalopathy due to MABLE on CKD and UTI.    - Cardiac arrest likely 2/2 hypoxia in setting of acute respiratory failure, likely i/s/o aspiration  - Pt achieved ROSC at 13 minutes  - Patient stabilized, transferred to ccu  - discussed with attending   - Family at bedside throughout code, updated and questions answered  - further management as per ccu    Critical Care time: 35 mins assessing presenting problems of acute illness that poses high probability of life threatening deterioration or end organ damage/dysfunction.  Medical decision making including Initiating plan of care, reviewing data, reviewing radiology, direct patient bedside evaluation and interpretation of vital signs, discussion with multidisciplinary team, discussing goals of care with patient/family, all non inclusive of procedures.

## 2024-08-08 NOTE — PHYSICAL THERAPY INITIAL EVALUATION ADULT - IMPAIRMENTS FOUND, PT EVAL
arousal, attention, and cognition/ergonomics and body mechanics/gait, locomotion, and balance/gross motor/integumentary integrity/muscle strength/posture

## 2024-08-08 NOTE — CONSULT NOTE ADULT - SUBJECTIVE AND OBJECTIVE BOX
SHAIK DONOHUE          MRN-[id number]           : 10/05/1933    HPI:  89 years old male with h/o HTN, HLD, DM, HFrEF, CAD s/p CABG s/p stents ( last stent in 2022), s/p PPM, CKD, PVD, h/o CVA x 3, s/p PEG, acute cholecystitis s/p cholecystostomy ( not surgical candidate, s/p cholecystomy tube removal  was brought in to ED with decreased responsiveness. Patient is bedbound, minimally verbal, A&OX1 baseline. Patient was noted to be unresponsive last night.     Had episode of low BP, sat well at RA, afebrile. No leukocytosis, plt 283, lactate 3.7---> 2.8, Cr 2.65 ( 1.58 in 2024), CT chest/abd/pelvis with no acute pathology. Stable bilateral pleural effusions and compressive atelectasis. CT head with no acute pathology. No significant change compared with 3/16/2024. (07 Aug 2024 11:32)    PAST MEDICAL & SURGICAL HISTORY:  Hyperlipemia  Hypertension  Coronary Artery Disease  Diabetes Mellitus Type II Stented Coronary Artery  total 5 stents, last stent 2019  Neuropathy  Myocardial infarction  Stroke  Myoclonic jerking  Stage 3 chronic kidney disease  History of Cataract Extraction  Hx of CABG  H/O coronary angiogram  S/P placement of cardiac pacemaker    FAMILY HISTORY:   Reviewed and found non contributory in mother or father    SOCIAL HISTORY: lives with son and DIL and 2 grandchildren,      ROS:                      Dyspnea (Juyd 0-10):  0                      N/V (Y/N):  N                            Secretions (Y/N) :    N            Agitation(Y/N): N  Pain (Y/N): pt denies       http://geriatrictoolkit.[email or web address]/cog/painad.[email or web address]  https://www.aci.health.[place].gov.au/__data/assets/pdf_file/0018/005455/[place]_Pain_Scale_Final.pdf  -Provocation/Palliation:  -Quality/Quantity:  -Radiating:  -Severity:  -Timing/Frequency:  -Impact on ADLs:  All other review of systems negative     Allergies    Cipro (Unknown)  carbamazepine (Other)  fluoroquinolone antibiotics (Other)  Tegretol (Unknown)    Intolerances    Opiate Naive (Y/N): Y  -iStop reviewed (Y/N): Yes. No Rx found on iStop  | Reference #: [id number]    Medications:      MEDICATIONS  (STANDING):  artificial  tears Solution 1 Drop(s) Both EYES four times a day  aspirin  chewable 81 milliGRAM(s) Oral daily  atorvastatin 20 milliGRAM(s) Oral at bedtime  carvedilol 6.25 milliGRAM(s) Oral every 12 hours  cefTRIAXone   IVPB 1000 milliGRAM(s) IV Intermittent every 24 hours  dextrose 5%. 1000 milliLiter(s) (50 mL/Hr) IV Continuous <Continuous>  dextrose 5%. 1000 milliLiter(s) (100 mL/Hr) IV Continuous <Continuous>  dextrose 50% Injectable 25 Gram(s) IV Push once  dextrose 50% Injectable 12.5 Gram(s) IV Push once  dextrose 50% Injectable 25 Gram(s) IV Push once  doxazosin 2 milliGRAM(s) Oral at bedtime  enoxaparin Injectable 30 milliGRAM(s) SubCutaneous every 24 hours  escitalopram 10 milliGRAM(s) Oral daily  glucagon  Injectable 1 milliGRAM(s) IntraMuscular once  insulin glargine Injectable (LANTUS) 10 Unit(s) SubCutaneous at bedtime  insulin lispro (ADMELOG) corrective regimen sliding scale   SubCutaneous three times a day before meals  lactated ringers. 1000 milliLiter(s) (100 mL/Hr) IV Continuous <Continuous>  pantoprazole   Suspension 40 milliGRAM(s) Oral daily  ticagrelor 60 milliGRAM(s) Enteral Tube two times a day  valproic  acid Syrup 125 milliGRAM(s) Oral three times a day    MEDICATIONS  (PRN):  acetaminophen   Oral Liquid .. 650 milliGRAM(s) Oral every 6 hours PRN Temp greater or equal to 38C (100.4F), Mild Pain (1 - 3), Moderate Pain (4 - 6)  dextrose Oral Gel 15 Gram(s) Oral once PRN Blood Glucose LESS THAN 70 milliGRAM(s)/deciliter  ondansetron Injectable 4 milliGRAM(s) IV Push every 8 hours PRN Nausea and/or Vomiting      Labs:    CBC:                        7.1    7.02  )-----------( 300      ( 08 Aug 2024 07:20 )             23.3     CMP:    08-08    144  |  111<H>  |  86<H>  ----------------------------<  212<H>  4.2   |  24  |  1.99<H>    Ca    8.7      08 Aug 2024 07:20  Phos  3.3     08-08  Mg     2.9     08-    TPro  7.3  /  Alb  1.8<L>  /  TBili  0.2  /  DBili  x   /  AST  23  /  ALT  17  /  AlkPhos  104  08-08     PT/INR - ( 07 Aug 2024 03:00 )   PT: 12.0 sec;   INR: 1.01 ratio         PTT - ( 07 Aug 2024 03:00 )  PTT:24.0 sec   Urinalysis Basic - ( 08 Aug 2024 07:20 )    Color: x / Appearance: x / SG: x / pH: x  Gluc: 212 mg/dL / Ketone: x  / Bili: x / Urobili: x   Blood: x / Protein: x / Nitrite: x   Leuk Esterase: x / RBC: x / WBC x   Sq Epi: x / Non Sq Epi: x / Bacteria: x    Imaging:     c< from: CT Abdomen and Pelvis No Cont (24 @ 05:03) >  ACC: [id number] EXAM:  CT ABDOMEN AND PELVIS   ORDERED BY: Carmen Dennis     COMPARISON: CT abdomen/pelvis of 2024. CT chest of 2024    IMPRESSION:  Stable bilateral pleural effusions and compressive atelectasis.    No evidence of acute intra-abdominal pathology.      < from: CT Head No Cont (24 @ 01:48) >  CC: [id number] EXAM:  CT BRAIN   ORDERED BY: Carmen Dennis     PROCEDURE DATE:  2024    COMPARISON: 3/16/2024.    IMPRESSION:  No acute intracranial hemorrhage, mass effect, or midline shift.  No significant change compared with 3/16/2024.    < from: Xray Chest 1 View-PORTABLE IMMEDIATE (24 @ 02:02) >  ACC: [id number] EXAM:  XR CHEST PORTABLE IMMED 1V   ORDERED BY: Carmen Dennis     PROCEDURE DATE:  2024      INTERPRETATION:  AP chest on 2024 at 1:59 AM. Patient has   sepsis.    Heart size normal.    Sternotomy and left-sided pacemaker again noted.    Lungs are clear and chest is similar to  this year.    IMPRESSION: No acute finding.    < from: 12 Lead ECG (24 @ 02:30) >    Ventricular Rate 87 BPM    Atrial Rate 86 BPM    QRS Duration 118 ms    Q-T Interval 416 ms    QTC Calculation(Bazett) 500 ms    R Axis -28 degrees    T Axis 112 degrees    Diagnosis Line Accelerated Junctional rhythm  Right bundle branch block  Abnormal ECG    Culture - Blood (24 @ 03:00)   Specimen Source: .Blood Blood-Peripheral  Culture Results:   PEx:  T(C): 36.8 (24 @ 16:45), Max: 37.4 (24 @ 10:47)  HR: 79 (24 @ 16:45) (75 - 87)  BP: 104/53 (24 @ 16:45) (104/53 - 111/78)  RR: 18 (24 @ 16:45) (18 - 19)  SpO2: 97% (24 @ 16:45) (94% - 100%)    Daily Weight in k (08 Aug 2024 04:26)    General: elderly confused male in bed in NAD   HEENT: A/T, N/T anicteric, MM dry   Neck: supple, no JVD   CVS: S1S2 irregular  Resp: CTAB, no RRW  GI:  softly distended + PGT   : voiding freely, intermittent incontinence    Musc: weakness     Neuro: oriented to name only, attempted to follow simple commands, slow to respond   Psych: calm and appropriate during assessment      Skin: Stage II Sacral wound~ 5.0cm x7.0cm x.10cm,, L heel DTI ~ 3.0cm x2.5cm    Preadmit Karnofsky:  30%           Current Karnofsky:     30%  http://www.npcrc.org/files/news/karnofsky_performance_scale.pdf   http://www.npcrc.org/files/news/palliative_performance_scale_PPSv2.pdf  Cachexia (Y/N): N  BMI: 23.5     Advanced Directives:  Full Code   HCP       Decision maker/Legal surrogate: Lázaro Estrella (son) [phone]    GOALS OF CARE DISCUSSION   Palliative care info/counseling provided	       Family meeting   Advanced Directives addressed please see [facility] discursion note below SHAIK DONOHUE          MRN-[id number]           : 10/05/1933    HPI:  89 years old male with h/o HTN, HLD, DM, HFrEF, CAD s/p CABG s/p stents ( last stent in 2022), s/p PPM, CKD, PVD, h/o CVA x 3, s/p PEG, acute cholecystitis s/p cholecystostomy ( not surgical candidate, s/p cholecystomy tube removal  was brought in to ED with decreased responsiveness. Patient is bedbound, minimally verbal, A&OX1 baseline. Patient was noted to be unresponsive last night.     Had episode of low BP, sat well at RA, afebrile. No leukocytosis, plt 283, lactate 3.7---> 2.8, Cr 2.65 ( 1.58 in 2024), CT chest/abd/pelvis with no acute pathology. Stable bilateral pleural effusions and compressive atelectasis. CT head with no acute pathology. No significant change compared with 3/16/2024. (07 Aug 2024 11:32)    PAST MEDICAL & SURGICAL HISTORY:  Hyperlipemia  Hypertension  Coronary Artery Disease  Diabetes Mellitus Type II Stented Coronary Artery  total 5 stents, last stent 2019  Neuropathy  Myocardial infarction  Stroke  Myoclonic jerking  Stage 3 chronic kidney disease  History of Cataract Extraction  Hx of CABG  H/O coronary angiogram  S/P placement of cardiac pacemaker    FAMILY HISTORY:   Reviewed and found non contributory in mother or father    SOCIAL HISTORY: lives with son and DIL and 2 grandchildren,      ROS:                      Dyspnea (Judy 0-10):  0                      N/V (Y/N):  N                            Secretions (Y/N) :    N            Agitation(Y/N): N  Pain (Y/N): pt denies       http://geriatrictoolkit.[place].[facility]/cog/painad.pdf  https://www.aci.health.nsw.gov.au/__data/assets/pdf_file/0018/531270/[place]_Pain_Scale_Final.pdf  -Provocation/Palliation:  -Quality/Quantity:  -Radiating:  -Severity:  -Timing/Frequency:  -Impact on ADLs:  All other review of systems negative     Allergies    Cipro (Unknown)  carbamazepine (Other)  fluoroquinolone antibiotics (Other)  Tegretol (Unknown)    Intolerances    Opiate Naive (Y/N): Y  -iStop reviewed (Y/N): Yes. No Rx found on iStop  | Reference #: [id number]    Medications:      MEDICATIONS  (STANDING):  artificial  tears Solution 1 Drop(s) Both EYES four times a day  aspirin  chewable 81 milliGRAM(s) Oral daily  atorvastatin 20 milliGRAM(s) Oral at bedtime  carvedilol 6.25 milliGRAM(s) Oral every 12 hours  cefTRIAXone   IVPB 1000 milliGRAM(s) IV Intermittent every 24 hours  dextrose 5%. 1000 milliLiter(s) (50 mL/Hr) IV Continuous <Continuous>  dextrose 5%. 1000 milliLiter(s) (100 mL/Hr) IV Continuous <Continuous>  dextrose 50% Injectable 25 Gram(s) IV Push once  dextrose 50% Injectable 12.5 Gram(s) IV Push once  dextrose 50% Injectable 25 Gram(s) IV Push once  doxazosin 2 milliGRAM(s) Oral at bedtime  enoxaparin Injectable 30 milliGRAM(s) SubCutaneous every 24 hours  escitalopram 10 milliGRAM(s) Oral daily  glucagon  Injectable 1 milliGRAM(s) IntraMuscular once  insulin glargine Injectable (LANTUS) 10 Unit(s) SubCutaneous at bedtime  insulin lispro (ADMELOG) corrective regimen sliding scale   SubCutaneous three times a day before meals  lactated ringers. 1000 milliLiter(s) (100 mL/Hr) IV Continuous <Continuous>  pantoprazole   Suspension 40 milliGRAM(s) Oral daily  ticagrelor 60 milliGRAM(s) Enteral Tube two times a day  valproic  acid Syrup 125 milliGRAM(s) Oral three times a day    MEDICATIONS  (PRN):  acetaminophen   Oral Liquid .. 650 milliGRAM(s) Oral every 6 hours PRN Temp greater or equal to 38C (100.4F), Mild Pain (1 - 3), Moderate Pain (4 - 6)  dextrose Oral Gel 15 Gram(s) Oral once PRN Blood Glucose LESS THAN 70 milliGRAM(s)/deciliter  ondansetron Injectable 4 milliGRAM(s) IV Push every 8 hours PRN Nausea and/or Vomiting      Labs:    CBC:                        7.1    7.02  )-----------( 300      ( 08 Aug 2024 07:20 )             23.3     CMP:    08-08    144  |  111<H>  |  86<H>  ----------------------------<  212<H>  4.2   |  24  |  1.99<H>    Ca    8.7      08 Aug 2024 07:20  Phos  3.3     08-08  Mg     2.9     08-    TPro  7.3  /  Alb  1.8<L>  /  TBili  0.2  /  DBili  x   /  AST  23  /  ALT  17  /  AlkPhos  104  08-08     PT/INR - ( 07 Aug 2024 03:00 )   PT: 12.0 sec;   INR: 1.01 ratio         PTT - ( 07 Aug 2024 03:00 )  PTT:24.0 sec   Urinalysis Basic - ( 08 Aug 2024 07:20 )    Color: x / Appearance: x / SG: x / pH: x  Gluc: 212 mg/dL / Ketone: x  / Bili: x / Urobili: x   Blood: x / Protein: x / Nitrite: x   Leuk Esterase: x / RBC: x / WBC x   Sq Epi: x / Non Sq Epi: x / Bacteria: x    Imaging:     c< from: CT Abdomen and Pelvis No Cont (24 @ 05:03) >  ACC: [id number] EXAM:  CT ABDOMEN AND PELVIS   ORDERED BY: Carmen Dennis     COMPARISON: CT abdomen/pelvis of 2024. CT chest of 2024    IMPRESSION:  Stable bilateral pleural effusions and compressive atelectasis.    No evidence of acute intra-abdominal pathology.      < from: CT Head No Cont (24 @ 01:48) >  CC: [id number] EXAM:  CT BRAIN   ORDERED BY: Carmen Dennis     PROCEDURE DATE:  2024    COMPARISON: 3/16/2024.    IMPRESSION:  No acute intracranial hemorrhage, mass effect, or midline shift.  No significant change compared with 3/16/2024.    < from: Xray Chest 1 View-PORTABLE IMMEDIATE (24 @ 02:02) >  ACC: [id number] EXAM:  XR CHEST PORTABLE IMMED 1V   ORDERED BY: Carmen Dennis     PROCEDURE DATE:  2024      INTERPRETATION:  AP chest on 2024 at 1:59 AM. Patient has   sepsis.    Heart size normal.    Sternotomy and left-sided pacemaker again noted.    Lungs are clear and chest is similar to  this year.    IMPRESSION: No acute finding.    < from: 12 Lead ECG (24 @ 02:30) >    Ventricular Rate 87 BPM    Atrial Rate 86 BPM    QRS Duration 118 ms    Q-T Interval 416 ms    QTC Calculation(Bazett) 500 ms    R Axis -28 degrees    T Axis 112 degrees    Diagnosis Line Accelerated Junctional rhythm  Right bundle branch block  Abnormal ECG    Culture - Blood (24 @ 03:00)   Specimen Source: .Blood Blood-Peripheral  Culture Results:   PEx:  T(C): 36.8 (24 @ 16:45), Max: 37.4 (24 @ 10:47)  HR: 79 (24 @ 16:45) (75 - 87)  BP: 104/53 (24 @ 16:45) (104/53 - 111/78)  RR: 18 (24 @ 16:45) (18 - 19)  SpO2: 97% (24 @ 16:45) (94% - 100%)    Daily Weight in k (08 Aug 2024 04:26)    General: elderly confused male in bed in NAD   HEENT: A/T, N/T anicteric, MM dry   Neck: supple, no JVD   CVS: S1S2 irregular  Resp: CTAB, no RRW  GI:  softly distended + PGT   : voiding freely, intermittent incontinence    Musc: weakness     Neuro: oriented to name only, attempted to follow simple commands, slow to respond   Psych: calm and appropriate during assessment      Skin: Stage II Sacral wound~ 5.0cm x7.0cm x.10cm,, L heel DTI ~ 3.0cm x2.5cm    Preadmit Karnofsky:  30%           Current Karnofsky:     30%  http://www.npcrc.org/files/news/karnofsky_performance_scale.pdf   http://www.npcrc.org/files/news/palliative_performance_scale_PPSv2.pdf  Cachexia (Y/N): N  BMI: 23.5     Advanced Directives:  Full Code   HCP       Decision maker/Legal surrogate: Lázaro Estrella (son) [phone]    GOALS OF CARE DISCUSSION   Palliative care info/counseling provided	       Family meeting   Advanced Directives addressed please see [facility] discussion note below

## 2024-08-08 NOTE — CONSULT NOTE ADULT - PROBLEM SELECTOR RECOMMENDATION 5
decline in functional and cognitive status over past 9 months  requiring more assist with ADL's since turn of the year   PGT feedings in place   OOB to chair with 2-3 assists, non weight bearing   remains mostly incontinent of bowel and bladder   PPSV2: 30%

## 2024-08-08 NOTE — CONSULT NOTE ADULT - SUBJECTIVE AND OBJECTIVE BOX
89y  Male  Cipro (Unknown)  carbamazepine (Other)  fluoroquinolone antibiotics (Other)  Tegretol (Unknown)    Patient is a 89y old  Male who presents with a chief complaint of AMS, MABLE on CKD, UTI (08 Aug 2024 17:14)    HPI:  89 years old male with h/o HTN, HLD, DM, HFrEF, CAD s/p CABG s/p stents ( last stent in 05/2022), s/p PPM, CKD, PVD, h/o CVA x 3, s/p PEG, acute cholecystitis s/p cholecystostomy ( not surgical candidate, s/p cholecystomy tube removal ? 8/1) was brought in to ED with decreased responsiveness. Patient is bedbound, minimally verbal, A&OX1 baseline. Patient was noted to be unresponsive last night.   Had episode of low BP, sat well at [facility], afebrile. No leukocytosis, plt 283, lactate 3.7---> 2.8, Cr 2.65 ( 1.58 in 07/2024), CT chest/abd/pelvis with no acute pathology. Stable bilateral pleural effusions and comrpessive atelectasis. CT head with no acute pathology. No significant change compared with 3/16/2024. (07 Aug 2024 11:32)    PAST MEDICAL & SURGICAL HISTORY:  Hyperlipemia      Hypertension      Coronary Artery Disease      Diabetes Mellitus Type II      Stented Coronary Artery  total 5 stents, last stent 5/2019      Neuropathy      Myocardial infarction      Stroke  mild left facial numbness   no other residuals verbalized      Myoclonic jerking      Stage 3 chronic kidney disease      History of Cataract Extraction      Hx of CABG      H/O coronary angiogram      S/P coronary artery stent placement  1/6/09      S/P placement of cardiac pacemaker        FAMILY HISTORY:      Vitals   Vital Signs Last 24 Hrs  T(C): 38.2 (09 Aug 2024 01:30), Max: 38.7 (08 Aug 2024 23:30)  T(F): 100.8 (09 Aug 2024 01:30), Max: 101.7 (08 Aug 2024 23:30)  HR: 85 (09 Aug 2024 01:30) (60 - 87)  BP: 128/46 (09 Aug 2024 01:30) (77/49 - 128/46)  BP(mean): 70 (09 Aug 2024 01:30) (59 - 71)  RR: 28 (09 Aug 2024 01:30) (18 - 35)  SpO2: 100% (09 Aug 2024 01:30) (97% - 100%)    Parameters below as of 08 Aug 2024 16:45  Patient On (Oxygen Delivery Method): room air      ABG - ( 09 Aug 2024 01:30 )  pH, Arterial: 7.38  pH, Blood: x     /  pCO2: 45    /  pO2: 427   / HCO3: 27    / Base Excess: 1.3   /  SaO2: 99.0              I&O's Summary    07 Aug 2024 07:01  -  08 Aug 2024 07:00  --------------------------------------------------------  IN: 0 mL / OUT: 700 mL / NET: -700 mL    08 Aug 2024 07:01  -  09 Aug 2024 01:55  --------------------------------------------------------  IN: 0 mL / OUT: 750 mL / NET: -750 mL        LABS  08-09    145  |  111<H>  |  85<H>  ----------------------------<  224<H>  4.9   |  20<L>  |  2.18<H>    Ca    8.1<L>      09 Aug 2024 00:00  Phos  5.5     08-09  Mg     2.9     08-09    TPro  6.8  /  Alb  1.8<L>  /  TBili  0.2  /  DBili  x   /  AST  77<H>  /  ALT  48  /  AlkPhos  130<H>  08-09                          6.5    7.73  )-----------( 299      ( 09 Aug 2024 01:15 )             21.8     PT/INR - ( 07 Aug 2024 03:00 )   PT: 12.0 sec;   INR: 1.01 ratio         PTT - ( 07 Aug 2024 03:00 )  PTT:24.0 sec  CARDIAC MARKERS ( 08 Aug 2024 07:20 )  x     / x     / x     / x     / 1.3 ng/mL  CARDIAC MARKERS ( 07 Aug 2024 12:15 )  x     / x     / x     / x     / 1.7 ng/mL      LIVER FUNCTIONS - ( 09 Aug 2024 00:00 )  Alb: 1.8 g/dL / Pro: 6.8 gm/dL / ALK PHOS: 130 U/L / ALT: 48 U/L / AST: 77 U/L / GGT: x           CAPILLARY BLOOD GLUCOSE      POCT Blood Glucose.: 184 mg/dL (08 Aug 2024 21:04)    Urinalysis Basic - ( 09 Aug 2024 00:00 )    Color: x / Appearance: x / SG: x / pH: x  Gluc: 224 mg/dL / Ketone: x  / Bili: x / Urobili: x   Blood: x / Protein: x / Nitrite: x   Leuk Esterase: x / RBC: x / WBC x   Sq Epi: x / Non Sq Epi: x / Bacteria: x        VENT SETTINGS   Mode: AC/ CMV (Assist Control/ Continuous Mandatory Ventilation)  RR (machine): 15  TV (machine): 450  FiO2: 100  PEEP: 5  ITime: 1  MAP: 19  PIP: 34      Meds  MEDICATIONS  (STANDING):  artificial  tears Solution 1 Drop(s) Both EYES four times a day  aspirin  chewable 81 milliGRAM(s) Oral daily  chlorhexidine 0.12% Liquid 15 milliLiter(s) Oral Mucosa every 12 hours  dextrose 5% + lactated ringers. 1000 milliLiter(s) (125 mL/Hr) IV Continuous <Continuous>  dextrose 5%. 1000 milliLiter(s) (50 mL/Hr) IV Continuous <Continuous>  dextrose 5%. 1000 milliLiter(s) (100 mL/Hr) IV Continuous <Continuous>  dextrose 50% Injectable 25 Gram(s) IV Push once  dextrose 50% Injectable 12.5 Gram(s) IV Push once  dextrose 50% Injectable 25 Gram(s) IV Push once  doxazosin 2 milliGRAM(s) Oral at bedtime  enoxaparin Injectable 30 milliGRAM(s) SubCutaneous every 24 hours  escitalopram 10 milliGRAM(s) Oral daily  glucagon  Injectable 1 milliGRAM(s) IntraMuscular once  insulin glargine Injectable (LANTUS) 10 Unit(s) SubCutaneous at bedtime  insulin lispro (ADMELOG) corrective regimen sliding scale   SubCutaneous three times a day before meals  levETIRAcetam  IVPB 500 milliGRAM(s) IV Intermittent once  levETIRAcetam  IVPB 500 milliGRAM(s) IV Intermittent every 12 hours  norepinephrine Infusion 0.05 MICROgram(s)/kG/Min (6.78 mL/Hr) IV Continuous <Continuous>  pantoprazole   Suspension 40 milliGRAM(s) Oral daily  piperacillin/tazobactam IVPB.- 3.375 Gram(s) IV Intermittent once  piperacillin/tazobactam IVPB.- 3.375 Gram(s) IV Intermittent once  piperacillin/tazobactam IVPB.- 3.375 Gram(s) IV Intermittent once  piperacillin/tazobactam IVPB.. 3.375 Gram(s) IV Intermittent every 8 hours  propofol Infusion. 20 MICROgram(s)/kG/Min (8.86 mL/Hr) IV Continuous <Continuous>  ticagrelor 60 milliGRAM(s) Enteral Tube two times a day  valproate sodium   IVPB 250 milliGRAM(s) IV Intermittent every 8 hours      REVIEW OF SYSTEMS:    Unable to obtain due to mechnical ventilation, sedation, poor mental status      Physicial Exam:     Constitutional: NAD, well-groomed, well-developed  HEENT: PERRLA, EOMI, no drainage or redness  Neck: No bruits; no thyromegaly or nodules,  No JVD  Back: Normal spine flexure, No CVA tenderness, No deformity or limitation of movement  Respiratory: Breath Sounds equal & clear to percussion & auscultation, no accessory muscle use  Cardiovascular: Regular rate & rhythm, normal S1, S2; no murmurs, gallops or rubs; no S3, S4  Gastrointestinal: Soft, non-tender, non distended no hepatosplenomegaly, normal bowel sounds  Extremities: No peripheral edema, No cyanosis, clubbing   Vascular: Equal and normal pulses: 2+ peripheral pulses throughout  Neurological: GCS:    A&O x 3; no sensory, motor  deficits, normal reflexes  Psychiatric: Normal mood, normal affect  Musculoskeletal: No joint pain, swelling or deformity; no limitation of movement  Skin: No rashes             89y  Male  Cipro (Unknown)  carbamazepine (Other)  fluoroquinolone antibiotics (Other)  Tegretol (Unknown)    CC; Patient is a 89y old  Male who presents with a chief complaint of AMS found to have MABLE on CKD with UTI    HPI:   89 year old male PMHx  HTN, HLD, DM, s/p PPM, CKD, PVD, CVA x 3, s/p PEG,  HFrEF, CAD s/p CABG s/p stents. He has had recent  acute cholecystitis s/p cholecystostomy  not surgical candidate, s/p cholecystomy tube removal  8/1. He presented to ER with altered mental status/less responsive  from his baseline for which he is bedbound, minimally verbal, A&OX1 at baseline. He was admitted for UTI with the increased lethargy attributed to increased MABLE and UTI. Tonight while being cleaned he experienced unresponsiveness and went into cardiac arrest    On arrival I found him with CPR in progress, first pulse check showed PEA, after airway was secured and 13 mins of ACLS with CPR ROSC was obtained. The liekly cause of this arrest is aspiration given during intubation tube feeds had filled the airway. He was transferred to CCU where he was eventually started on IV pressors to maintain MAP greater than 65.         PAST MEDICAL & SURGICAL HISTORY:  Hyperlipemia  Hypertension  Coronary Artery Disease  Diabetes Mellitus Type II    Stented Coronary Artery  total 5 stents, last stent 5/2019  Neuropathy  Myocardial infarction  Stroke  mild left facial numbness   no other residuals verbalized  Myoclonic jerking  tage 3 chronic kidney disease  History of Cataract Extraction  Hx of CABG  H/O coronary angiogram  S/P coronary artery stent placement  1/6/09  S/P placement of cardiac pacemaker    Vital Signs Last 24 Hrs  T(C): 38.2 (09 Aug 2024 01:30), Max: 38.7 (08 Aug 2024 23:30)  T(F): 100.8 (09 Aug 2024 01:30), Max: 101.7 (08 Aug 2024 23:30)  HR: 85 (09 Aug 2024 01:30) (60 - 87)  BP: 128/46 (09 Aug 2024 01:30) (77/49 - 128/46)  BP(mean): 70 (09 Aug 2024 01:30) (59 - 71)  RR: 28 (09 Aug 2024 01:30) (18 - 35)  SpO2: 100% (09 Aug 2024 01:30) (97% - 100%)    Parameters below as of 08 Aug 2024 16:45  Patient On (Oxygen Delivery Method): room air    ABG - ( 09 Aug 2024 01:30 )  pH, Arterial: 7.38  pH, Blood: x     /  pCO2: 45    /  pO2: 427   / HCO3: 27    / Base Excess: 1.3   /  SaO2: 99.0        I&O's Summary  07 Aug 2024 07:01  -  08 Aug 2024 07:00  --------------------------------------------------------  IN: 0 mL / OUT: 700 mL / NET: -700 mL    08 Aug 2024 07:01  -  09 Aug 2024 01:55  --------------------------------------------------------  IN: 0 mL / OUT: 750 mL / NET: -750 mL      LABS  08-09  145  |  111<H>  |  85<H>  ----------------------------<  224<H>  4.9   |  20<L>  |  2.18<H>    Ca    8.1<L>      09 Aug 2024 00:00  Phos  5.5     08-09  Mg     2.9     08-09  TPro  6.8  /  Alb  1.8<L>  /  TBili  0.2  /  DBili  x   /  AST  77<H>  /  ALT  48  /  AlkPhos  130<H>  08-09                       6.5    7.73  )-----------( 299      ( 09 Aug 2024 01:15 )             21.8     PT/INR - ( 07 Aug 2024 03:00 )   PT: 12.0 sec;   INR: 1.01 ratio    PTT - ( 07 Aug 2024 03:00 )  PTT:24.0 sec  CARDIAC MARKERS ( 08 Aug 2024 07:20 )  x     / x     / x     / x     / 1.3 ng/mL  CARDIAC MARKERS ( 07 Aug 2024 12:15 )  x     / x     / x     / x     / 1.7 ng/mL      LIVER FUNCTIONS - ( 09 Aug 2024 00:00 )  Alb: 1.8 g/dL / Pro: 6.8 gm/dL / ALK PHOS: 130 U/L / ALT: 48 U/L / AST: 77 U/L / GGT: x           CAPILLARY BLOOD GLUCOSE  POCT Blood Glucose.: 184 mg/dL (08 Aug 2024 21:04)    Urinalysis Basic - ( 09 Aug 2024 00:00 )  Color: x / Appearance: x / SG: x / pH: x  Gluc: 224 mg/dL / Ketone: x  / Bili: x / Urobili: x   Blood: x / Protein: x / Nitrite: x   Leuk Esterase: x / RBC: x / WBC x   Sq Epi: x / Non Sq Epi: x / Bacteria: x    VENT SETTINGS   Mode: AC/ CMV (Assist Control/ Continuous Mandatory Ventilation)  RR (machine): 15  TV (machine): 450  FiO2: 100  PEEP: 5  ITime: 1  MAP: 19  PIP: 34      Meds  MEDICATIONS  (STANDING):  artificial  tears Solution 1 Drop(s) Both EYES four times a day  aspirin  chewable 81 milliGRAM(s) Oral daily  chlorhexidine 0.12% Liquid 15 milliLiter(s) Oral Mucosa every 12 hours  dextrose 5% + lactated ringers. 1000 milliLiter(s) (125 mL/Hr) IV Continuous <Continuous>  dextrose 5%. 1000 milliLiter(s) (50 mL/Hr) IV Continuous <Continuous>  dextrose 5%. 1000 milliLiter(s) (100 mL/Hr) IV Continuous <Continuous>  dextrose 50% Injectable 25 Gram(s) IV Push once  dextrose 50% Injectable 12.5 Gram(s) IV Push once  dextrose 50% Injectable 25 Gram(s) IV Push once  doxazosin 2 milliGRAM(s) Oral at bedtime  enoxaparin Injectable 30 milliGRAM(s) SubCutaneous every 24 hours  escitalopram 10 milliGRAM(s) Oral daily  glucagon  Injectable 1 milliGRAM(s) IntraMuscular once  insulin glargine Injectable (LANTUS) 10 Unit(s) SubCutaneous at bedtime  insulin lispro (ADMELOG) corrective regimen sliding scale   SubCutaneous three times a day before meals  levETIRAcetam  IVPB 500 milliGRAM(s) IV Intermittent once  levETIRAcetam  IVPB 500 milliGRAM(s) IV Intermittent every 12 hours  norepinephrine Infusion 0.05 MICROgram(s)/kG/Min (6.78 mL/Hr) IV Continuous <Continuous>  pantoprazole   Suspension 40 milliGRAM(s) Oral daily  piperacillin/tazobactam IVPB.- 3.375 Gram(s) IV Intermittent once  piperacillin/tazobactam IVPB.- 3.375 Gram(s) IV Intermittent once  piperacillin/tazobactam IVPB.- 3.375 Gram(s) IV Intermittent once  piperacillin/tazobactam IVPB.. 3.375 Gram(s) IV Intermittent every 8 hours  propofol Infusion. 20 MICROgram(s)/kG/Min (8.86 mL/Hr) IV Continuous <Continuous>  ticagrelor 60 milliGRAM(s) Enteral Tube two times a day  valproate sodium   IVPB 250 milliGRAM(s) IV Intermittent every 8 hours      REVIEW OF SYSTEMS:  Unable to obtain due to mechnical ventilation, sedation, poor mental status      Physicial Exam:   HEENT: PERRLA, EOMI, no drainage or redness  Neck: No bruits; no thyromegaly or nodules,  No JVD  Back: Normal spine flexure, No CVA tenderness, No deformity or limitation of movement  Respiratory: Breath Sounds equal & clear to percussion & auscultation, no accessory muscle use  Cardiovascular: Regular rate & rhythm, normal S1, S2; no murmurs, gallops or rubs; no S3, S4  Gastrointestinal: Soft, non-tender, non distended no hepatosplenomegaly, normal bowel sounds  Extremities: No peripheral edema, No cyanosis, clubbing   Vascular: Equal and normal pulses: 2+ peripheral pulses throughout  Neurological: GCS:    A&O x 3; no sensory, motor  deficits, normal reflexes  Psychiatric: Normal mood, normal affect  Musculoskeletal: No joint pain, swelling or deformity; no limitation of movement  Skin: No rashes

## 2024-08-08 NOTE — CONSULT NOTE ADULT - PROBLEM SELECTOR RECOMMENDATION 6
Met with son Lázaro Estrella today for GOC discussion  Discussed pt recent decline and clinical picture since 12/2023  Son wishes for another meeting with his wife and other family members to further discuss GOC  Son to call this NP with availability for meeting on Monday. Met with son Lázaro Estrella today for GOC discussion  Discussed pt recent decline and clinical picture since 12/2023  Son wishes for another meeting with his wife and other family members to further discuss GOC  Son to call this NP with availability for meeting on Monday.    discussed with Dr Jean

## 2024-08-08 NOTE — PHYSICAL THERAPY INITIAL EVALUATION ADULT - GENERAL OBSERVATIONS, REHAB EVAL
Pt encountered semi-fowlers in bed w/ NAD, AxOx1, +IV, +feeding tube, +primafit, +cardiac monitor; no verbal or non verbal indicators of pain present. Grandson present at bedside to translate; pt able to understand grandson more than therapist.

## 2024-08-08 NOTE — CHART NOTE - NSCHARTNOTEFT_GEN_A_CORE
Responded to code blue called at 10:24 PM. Primary RN states patient was just cleaned up after having a bowel movement. RN left the room for a couple minutes and nursing aide found patient unresponsive. Zoll immediately connected. Initial rhythm PEA. Son at bedside confirmed code status is full code. Received total of epi x 4 and bicarb x 1. Intubated with ICU PA and respiratory therapist at bedside. ROSC achieved at 10:37 PM. Presumably respiratory arrest secondary to aspiration given gastric contents were suctioned after ETT tube placement. Started on levophed to maintain MAP > 65. Transferred to CCU bed 3.

## 2024-08-08 NOTE — PHYSICAL THERAPY INITIAL EVALUATION ADULT - ADDITIONAL COMMENTS
Patient's grand son report, patient lives with his son in house, 3 steps to enter, non ambulatory, bed bound.

## 2024-08-08 NOTE — CONSULT NOTE ADULT - PROBLEM SELECTOR RECOMMENDATION 9
brought in with decreased/unresponsiveness x 1 day   CT head shows no acute pathology. No significant change compared to 3/16/2024  UA dirty. Cr 2.65 ( 1.58 in 07/2024)  Likely metabolic encephalopathy due to MABLE on CKD, UTI  started on Ceftriaxone for UTI

## 2024-08-09 NOTE — DIETITIAN INITIAL EVALUATION ADULT - NSFNSPHYEXAMSKINFT_GEN_A_CORE
Pressure Injury 1: Sacrum - Stage II formed from IAD  Pressure Injury 2: Left:, Heel - Stage II formed from IAD  as per flow sheets

## 2024-08-09 NOTE — CONSULT NOTE ADULT - SUBJECTIVE AND OBJECTIVE BOX
HPI: 89 year old man with hx of strokes, seizure disorder, DM2, HTN, HLD, CAD s/p stents, CKD, PVD, and dementia admitted with unresponsive episode on 8/7/24 then coded on 8/8/24. Patient was noted to be unresponsive and pulseless when nurse came to turn him. CPR and ROSC ~13 mins. CT head was unremarkable. Patient noted to have twitching and propofol started. Patient was loaded with Keppra and his Depakote continued. Patient was supposed to have Vimpat added outpatient. At baseline AOx1 and bedbound. Overall health decline in the last few months.     PMHx: strokes, seizure disorder, DM2, HTN, HLD, CAD s/p stents, CKD, PVD, dementia, neuropathy  PSHx: cardiac stent, PPM, CABG, PEG  PFHx: N/A  Social Hx: lives at home with son and his family  Allergies: Tegretol, Cipro, floroquinolones  ROS: unable to obtain due to AMS  Medications: see EMR    Vitals: Temp  99.7F     RR 17       HR  73     BP   143/52  General: NAD  CV: regular rate and rhythm  Resp: on vent  Neck: supple  Neuro Exam: Intubated and minimally sedated on propofol. Comatose. Does not open eyes to voice and does not follow commands. VOR intact. No facial droop. PERRL with 2mm pupils. No withdrawal to pain in arms and triple flexion response in legs. Reflexes diminished and toes up bilaterally. Gait exam deferred. Reflexive myoclonus noted and worse with pain or touch on left side.    CT head and labs reviewed

## 2024-08-09 NOTE — PROCEDURE NOTE - ADDITIONAL PROCEDURE DETAILS
The patent was placed in supine position no sedation required pt was in cardiac arrest.   Oral suctioning performed to clear the airway, which was filled with tube feeds from stomach. After clearing the airway the patient was quickly ventilated via BVM to greater than 95% . The Glydeescope was inserted into oropharynx with a grade 1 view  , then 7.5 [place] endotracheal tube was inserted under direct visualization of vocal cords.   The stylet was removed, the balloon was inflated, positive breath sounds were auscultated bilaterally , no gastric sounds were auscultated over stomach, positive color change on ETCO2 detector and pt's o2 sats increased to 98%. The ET tube was secured in place 24cm at the teeth and the patient  was placed on mech.  ventilation. Portable chest xray confirmed the ET tube to be 1-2 cm above the fox and new NG tube inlace in the stomach.
This central line was placed post cardiac arrest in the setting of shock state requiring IV pressors to maintain blood pressure and poor peripheral IV access for multiple drips

## 2024-08-09 NOTE — PROVIDER CONTACT NOTE (EICU) - ACTION/TREATMENT ORDERED:
bedside team to assess access
vent orders updated to reflect current vent settings noted during eICU vent rounds
d/w second overnight eicu md who will follow up   bedside acp paged.

## 2024-08-09 NOTE — PROCEDURE NOTE - NSPOSTCAREGUIDE_GEN_A_CORE
Instructed patient/caregiver regarding signs and symptoms of infection
Care for catheter as per unit/ICU protocols

## 2024-08-09 NOTE — PROVIDER CONTACT NOTE (EICU) - SITUATION
Provider Location: [facility]   Patient Location:  [facility]
Provider Location: [facility].   Patient Location: [facility]

## 2024-08-09 NOTE — PROGRESS NOTE ADULT - ASSESSMENT
Problem List:  1) cardiac arrest  2) aspiration pneumonitis  3) Acute hypoxic respiratory failure  4) myoclonus   5) septic shock     monitor off sedation, await for return of mental status if possible. EEG with no seizures but frequent bouts of myoclonus. Patient appears uncomfortable will increase vaproate to 500mg BID. Keppra 500mg BID on board, will try to avoid benzos. Check valproate level in AM.   On levophed actively titrating to maintain MAP > 65. suspect septic shock etiology  Hypoxic failure requiring intubation due to aspiration and cardiac arrest. Fio2 40% and peep at 5. Wean as tolerated SBT in AM   Holding tube feeds in the setting of leaking PEG tube  CKD, making urine, AM labs  lovenox for DVT-P   glucose within appropriate range on lantus and sliding scale insulin

## 2024-08-09 NOTE — PROVIDER CONTACT NOTE (EICU) - ASSESSMENT
post cardiac arrest  shock
Post cardiac arrest suspect anoxic injury   anemia  lactic acidosis  respiratory acidosis on ct  shock

## 2024-08-09 NOTE — PROVIDER CONTACT NOTE (EICU) - RECOMMENDATIONS
check labs,   pressors to maintain map > 65 increased  cxr b/l vascular congestion Et tube in place
ACP paged to discuss  Will give ativan 2 mg, start valproic acid, have propofol for back up  repeat ABG with sedation, RR 40 and pt not getting TV  repeat cbc and type and screen  EEG for am, ceribell available will place

## 2024-08-09 NOTE — DIETITIAN INITIAL EVALUATION ADULT - ENTER TO (ML/KG)
UPDATE

NOTIFIED DR SANTOS REGARDING PATIENT IS LEAVING AMA. SHE STATED SHE WOULD CALL DR HANSON'S 
OFFICE [phone] FOR A FOLLOW UP APPOINTMENT WITH HIM. TYLER HERNANDEZ TOOK HER DOWNSTAIRS AND 
WATCHED HER GET IN VEHICLE THAT CAME TO PICK HER UP. 30

## 2024-08-09 NOTE — PROVIDER CONTACT NOTE (EICU) - BACKGROUND
Was contacted by RN   for labs showing elevated lactic acid and anemia,   When camera in patient having active myoclonic jerks, possible seizures - ativan 2 mg x 1 ordered  family bedside states patient was being weaned off valproic acid, baseline dose is 500mg TID family bedside who state is neurologist asking to restart valproic acid.

## 2024-08-09 NOTE — DIETITIAN INITIAL EVALUATION ADULT - OTHER INFO
Pt with PEG, on tube feeds PTA- per previous RD note (07/21/24) pt had been receiving Glucerna 1.5 @60ml x 22 hrs/day prior to that admission.  Pt NPO x 2 days following code blue and intubation; tube feeds were discontinued due to possible aspiration.  Pt remains intubated and sedated.   Palliative following for GOC; remains full code at this time.  Weight stable x 1 month per previous RD note (07/21/24) weight: 74.7kg.  Pt with T2DM; insulin use PTA as per H&P. HbA1c 7.7% indicates fair blood glucose management.  RD remains available.

## 2024-08-09 NOTE — DIETITIAN INITIAL EVALUATION ADULT - REASON INDICATOR FOR ASSESSMENT
Pt seen for nutrition consult for pressure injury stage II or greater and MST score 2 or greater; nutrition consult for tube feeds- tube feeds discontinued due to code blue; CCU admission

## 2024-08-09 NOTE — PROGRESS NOTE ADULT - ASSESSMENT
89M w/ HTN, HLD, DM, HFrEF, CAD, s/p PPM, CKD, PVD, s/p CVA x3, s/p PEG, acute cholecystitis s/p cholecystostomy, prolonged hospital course dec 23-march 24 w/ COVID19 and multiple complications including tracheostomy has since been decannulated; has been declining recently and bedbound. Admitted w/ AMS in setting of MABLE and UTI. Course complicated by cardiac arrest x 13 mins likely due to aspiration. Transferred to ICU.     #Neuro - neuro exam off propofol is poor - overbreaths vent; awaiting CTH read; reports of myoclonus, which is poor prognostic sign, awaiting VEEG; continue keppra and valproate; neuro consult  #CV - in shock state, likely post-arrest shock, titrate to MAP >/65-70 for now; hold home BP meds, continue asa and ticagralor  #Pulm - intubated in setting of cardiac arrest, likely aspiration event as TF were reportedly suctioned out; duonebs for airway clearance; on minimal vent settings; SBT as tolerated, but unable to extubate due to poor mental status  #ID- currently on zosyn for likely aspiration pneumonia, pt does have a hx of ESBL in sputum but given seizures and medication interactions continue zosyn for now, add vancomycin as pt has MRSA PCR+ and will de-escalate once sputum cx returns; send sputum cx; f/u blood and urine cx  #Renal/metabolic - MABLE on CKD, likely post-arrest ATN; monitor I/Os and electorlytes; d/c IVF  #GI- start TF via PEG tube; noted large stool burden on CT - start senna and miralax; continue PPI  #Heme - anemia w/ hgb to 6.3 this morning, s/p 2 pRBC w/ appropriate response, no active bleeding noted  #Endo - continue lantus, FS w/ ISS q6  #Skin - pressure ulcer unloading  #PPx - lovenox qd  #Dispo- remains in ICU ventilator dependent w/ poor neuro exam; prognosis for recovery is poor; full code for now    Plan of care discussed w/ pt's son at bedside, all questions answered

## 2024-08-09 NOTE — DIETITIAN INITIAL EVALUATION ADULT - PERTINENT MEDS FT
MEDICATIONS  (STANDING):  artificial  tears Solution 1 Drop(s) Both EYES four times a day  aspirin  chewable 81 milliGRAM(s) Oral daily  chlorhexidine 0.12% Liquid 15 milliLiter(s) Oral Mucosa every 12 hours  dextrose 5%. 1000 milliLiter(s) (100 mL/Hr) IV Continuous <Continuous>  dextrose 5%. 1000 milliLiter(s) (50 mL/Hr) IV Continuous <Continuous>  dextrose 50% Injectable 12.5 Gram(s) IV Push once  dextrose 50% Injectable 25 Gram(s) IV Push once  dextrose 50% Injectable 25 Gram(s) IV Push once  doxazosin 2 milliGRAM(s) Oral at bedtime  enoxaparin Injectable 30 milliGRAM(s) SubCutaneous every 24 hours  escitalopram 10 milliGRAM(s) Oral daily  glucagon  Injectable 1 milliGRAM(s) IntraMuscular once  insulin glargine Injectable (LANTUS) 10 Unit(s) SubCutaneous at bedtime  insulin lispro (ADMELOG) corrective regimen sliding scale   SubCutaneous three times a day before meals  lactated ringers. 1000 milliLiter(s) (75 mL/Hr) IV Continuous <Continuous>  levETIRAcetam  IVPB 500 milliGRAM(s) IV Intermittent every 12 hours  norepinephrine Infusion 0.05 MICROgram(s)/kG/Min (6.78 mL/Hr) IV Continuous <Continuous>  pantoprazole   Suspension 40 milliGRAM(s) Oral daily  piperacillin/tazobactam IVPB.- 3.375 Gram(s) IV Intermittent once  piperacillin/tazobactam IVPB.- 3.375 Gram(s) IV Intermittent once  piperacillin/tazobactam IVPB.. 3.375 Gram(s) IV Intermittent every 8 hours  propofol Infusion. 20 MICROgram(s)/kG/Min (8.86 mL/Hr) IV Continuous <Continuous>  ticagrelor 60 milliGRAM(s) Enteral Tube two times a day  valproate sodium   IVPB 250 milliGRAM(s) IV Intermittent every 8 hours    MEDICATIONS  (PRN):  acetaminophen   Oral Liquid .. 650 milliGRAM(s) Oral every 6 hours PRN Temp greater or equal to 38C (100.4F), Mild Pain (1 - 3), Moderate Pain (4 - 6)  dextrose Oral Gel 15 Gram(s) Oral once PRN Blood Glucose LESS THAN 70 milliGRAM(s)/deciliter  ondansetron Injectable 4 milliGRAM(s) IV Push every 8 hours PRN Nausea and/or Vomiting

## 2024-08-09 NOTE — PROGRESS NOTE ADULT - SUBJECTIVE AND OBJECTIVE BOX
Patient is a 89y old  Male who presents with a chief complaint of AMS, MABLE on CKD, UTI (09 Aug 2024 13:43)      BRIEF HOSPITAL COURSE: 89M w/ HTN, HLD, DM, HFrEF, CAD, s/p PPM, CKD, PVD, s/p CVA x3, s/p PEG, acute cholecystitis s/p cholecystostomy, prolonged hospital course dec 23-march 24 w/ COVID19 and multiple complications including tracheostomy has since been decannulated; has been declining recently and bedbound. Admitted w/ AMS in setting of MABLE and UTI. Course complicated by cardiac arrest x 13 mins likely due to aspiration. Transferred to ICU.     Events last 24 hours: Remains intubated with poor mental status and frequent myoclonus. Making urine. No sedation    PAST MEDICAL & SURGICAL HISTORY:  Hyperlipemia      Hypertension      Coronary Artery Disease      Diabetes Mellitus Type II      Stented Coronary Artery  total 5 stents, last stent 5/2019      Neuropathy      Myocardial infarction      Stroke  mild left facial numbness   no other residuals verbalized      Myoclonic jerking      Stage 3 chronic kidney disease      History of Cataract Extraction      Hx of CABG      H/O coronary angiogram      S/P coronary artery stent placement  1/6/09      S/P placement of cardiac pacemaker          Review of Systems:  unable to obtain     Medications:  piperacillin/tazobactam IVPB.. 3.375 Gram(s) IV Intermittent every 8 hours    doxazosin 2 milliGRAM(s) Oral at bedtime  norepinephrine Infusion 0.05 MICROgram(s)/kG/Min IV Continuous <Continuous>    albuterol/ipratropium for Nebulization 3 milliLiter(s) Nebulizer every 6 hours    acetaminophen   Oral Liquid .. 650 milliGRAM(s) Oral every 6 hours PRN  escitalopram 10 milliGRAM(s) Oral daily  levETIRAcetam  IVPB 500 milliGRAM(s) IV Intermittent every 12 hours      aspirin  chewable 81 milliGRAM(s) Oral daily  enoxaparin Injectable 30 milliGRAM(s) SubCutaneous every 24 hours  ticagrelor 60 milliGRAM(s) Enteral Tube two times a day    pantoprazole   Suspension 40 milliGRAM(s) Oral daily  polyethylene glycol 3350 17 Gram(s) Oral daily  senna 2 Tablet(s) Oral at bedtime      insulin glargine Injectable (LANTUS) 10 Unit(s) SubCutaneous at bedtime  insulin lispro (ADMELOG) corrective regimen sliding scale   SubCutaneous every 6 hours    sodium chloride 0.9% lock flush 10 milliLiter(s) IV Push every 1 hour PRN      artificial  tears Solution 1 Drop(s) Both EYES four times a day  chlorhexidine 0.12% Liquid 15 milliLiter(s) Oral Mucosa every 12 hours  chlorhexidine 2% Cloths 1 Application(s) Topical <User Schedule>        Mode: AC/ CMV (Assist Control/ Continuous Mandatory Ventilation)  RR (machine): 15  TV (machine): 450  FiO2: 40  PEEP: 5  ITime: 1  MAP: 10  PIP: 23      ICU Vital Signs Last 24 Hrs  T(C): 37.8 (09 Aug 2024 22:30), Max: 38.7 (08 Aug 2024 23:30)  T(F): 100 (09 Aug 2024 22:30), Max: 101.7 (08 Aug 2024 23:30)  HR: 77 (09 Aug 2024 22:30) (60 - 87)  BP: 147/51 (09 Aug 2024 22:30) (77/49 - 151/54)  BP(mean): 79 (09 Aug 2024 22:30) (59 - 89)  ABP: --  ABP(mean): --  RR: 17 (09 Aug 2024 22:30) (13 - 35)  SpO2: 100% (09 Aug 2024 22:30) (95% - 100%)    O2 Parameters below as of 09 Aug 2024 22:00  Patient On (Oxygen Delivery Method): ventilator            ABG - ( 09 Aug 2024 01:30 )  pH, Arterial: 7.38  pH, Blood: x     /  pCO2: 45    /  pO2: 427   / HCO3: 27    / Base Excess: 1.3   /  SaO2: 99.0                I&O's Detail    08 Aug 2024 07:01  -  09 Aug 2024 07:00  --------------------------------------------------------  IN:    dextrose 5% + lactated ringers: 500 mL    IV PiggyBack: 400 mL    Lactated Ringers Bolus: 500 mL    Norepinephrine: 434.1 mL    PRBCs (Packed Red Blood Cells): 600 mL    Propofol (Status Epilepticus): 62.3 mL  Total IN: 2496.4 mL    OUT:    Incontinent per Condom Catheter (mL): 300 mL    Voided (mL): 650 mL  Total OUT: 950 mL    Total NET: 1546.4 mL      09 Aug 2024 07:01  -  09 Aug 2024 23:24  --------------------------------------------------------  IN:    Glucerna 1.5: 20 mL    IV PiggyBack: 750 mL    Lactated Ringers: 300 mL    Norepinephrine: 738.6 mL    Propofol (Status Epilepticus): 21.8 mL  Total IN: 1830.4 mL    OUT:    Incontinent per Condom Catheter (mL): 700 mL    Voided (mL): 300 mL  Total OUT: 1000 mL    Total NET: 830.4 mL            LABS:                        9.6    14.63 )-----------( 273      ( 09 Aug 2024 09:52 )             30.0     08-09    145  |  108  |  92<H>  ----------------------------<  317<H>  4.2   |  27  |  2.22<H>    Ca    8.2<L>      09 Aug 2024 05:00  Phos  4.6     08-09  Mg     2.7     08-09    TPro  6.8  /  Alb  1.8<L>  /  TBili  0.2  /  DBili  x   /  AST  77<H>  /  ALT  48  /  AlkPhos  130<H>  08-09      CARDIAC MARKERS ( 08 Aug 2024 07:20 )  x     / x     / x     / x     / 1.3 ng/mL      CAPILLARY BLOOD GLUCOSE      POCT Blood Glucose.: 180 mg/dL (09 Aug 2024 22:24)      Urinalysis Basic - ( 09 Aug 2024 05:00 )    Color: x / Appearance: x / SG: x / pH: x  Gluc: 317 mg/dL / Ketone: x  / Bili: x / Urobili: x   Blood: x / Protein: x / Nitrite: x   Leuk Esterase: x / RBC: x / WBC x   Sq Epi: x / Non Sq Epi: x / Bacteria: x      CULTURES:  Culture Results:   <10,000 CFU/mL Normal Urogenital Aurelia (08-07-24 @ 11:30)  Culture Results:   No growth at 48 Hours (08-07-24 @ 03:00)  Culture Results:   No growth at 48 Hours (08-07-24 @ 03:00)      Physical Examination:    General: unresponsive, frquent jerking movements, no purposeful     PULM: diminished b/l     CVS: Regular rate and rhythm    ABD: Soft, nondistended, nontender    EXT: No edema, nontender    SKIN: Warm    NEURO: unresponsive       RADIOLOGY: ACC: [id number] EXAM:  CT BRAIN   ORDERED BY: Manuel Rowe     PROCEDURE DATE:  08/09/2024          INTERPRETATION:  CLINICAL INFORMATION: post cardiac arrest    COMPARISON: None.    CONTRAST:  IV Contrast:  Complications:    TECHNIQUE:  Serial axial images were obtained from the skull base to the   vertex using multi-slice helical technique. Sagittal and coronal   reformats were obtained.    FINDINGS:    VENTRICLES AND SULCI: Age appropriate involutional changes.  INTRA-AXIAL: No mass effect, acute hemorrhage, or midline shift.  There   are periventricular and subcortical white matter hypodensities,   consistent with microvascular type changes.  EXTRA-AXIAL: No mass or fluid collection. Basal cisterns are normal in   appearance.    VISUALIZEDSINUSES:  Patchy mucosal thickening in the paranasal sinuses.  TYMPANOMASTOID CAVITIES:  Small mastoid effusions bilaterally.  VISUALIZED ORBITS: Bilateral lens replacement.  CALVARIUM: Intact.    MISCELLANEOUS: None.      IMPRESSION:  No acute intracranial hemorrhage, mass effect, or midline shift.        --- End of Report ---    Mathew Jones MD  This document has been electronically signed. Aug  9 2024  3:12PM      CRITICAL CARE TIME SPENT: 42 minutes assessing presenting problems of acute illness, which pose high probability of life threatening deterioration or end organ damage/dysfunction, as well as medical decision making including initiating plan of care, reviewing data, reviewing radiologic exams, discussing with multidisciplinary team,  discussing goals of care with patient/family, and writing this note.  Non-inclusive of procedures performed.    Date of Entry of this note is equal to the date of services rendered

## 2024-08-09 NOTE — DIETITIAN INITIAL EVALUATION ADULT - PERTINENT LABORATORY DATA
08-09    145  |  108  |  92<H>  ----------------------------<  317<H>  4.2   |  27  |  2.22<H>    Ca    8.2<L>      09 Aug 2024 05:00  Phos  4.6     08-09  Mg     2.7     08-09    TPro  6.8  /  Alb  1.8<L>  /  TBili  0.2  /  DBili  x   /  AST  77<H>  /  ALT  48  /  AlkPhos  130<H>  08-09  POCT Blood Glucose.: 341 mg/dL (08-09-24 @ 08:02)  A1C with Estimated Average Glucose Result: 7.7 % (06-11-24 @ 04:04)  A1C with Estimated Average Glucose Result: 9.3 % (12-24-23 @ 06:53)

## 2024-08-09 NOTE — CHART NOTE - NSCHARTNOTEFT_GEN_A_CORE
EEG preliminary read (not final) on the initial recording hour(s) = >1 hr    -No seizures  -Intermittent subcortical myoclonus: low-amplitude jerking/trembling of head (tilting to left) and left shoulder. EEG shows movement artifact and no epileptiform abnormalities or seizure.  -Severe diffuse cerebral dysfunction, nonspecific in etiology.         Final report to follow tomorrow morning after completion of study.    [facility] EEG Reading Room Ph#: [phone]  Epilepsy Answering Service after 5PM and before 8:30AM: Ph#: [phone]

## 2024-08-09 NOTE — PROGRESS NOTE ADULT - SUBJECTIVE AND OBJECTIVE BOX
CHIEF COMPLAINT:    Interval Events:    REVIEW OF SYSTEMS:  Constitutional: [ ] fevers [ ] chills [ ] weight loss [ ] weight gain  HEENT: [ ] dry eyes [ ] eye irritation [ ] postnasal drip [ ] nasal congestion  CV: [ ] chest pain [ ] orthopnea [ ] palpitations [ ] murmur  Resp: [ ] cough [ ] shortness of breath [ ] dyspnea [ ] wheezing [ ] sputum [ ] hemoptysis  GI: [ ] nausea [ ] vomiting [ ] diarrhea [ ] constipation [ ] abd pain [ ] dysphagia   : [ ] dysuria [ ] nocturia [ ] hematuria [ ] increased urinary frequency  Musculoskeletal: [ ] back pain [ ] myalgias [ ] arthralgias [ ] fracture  Skin: [ ] rash [ ] itch  Neurological: [ ] headache [ ] dizziness [ ] syncope [ ] weakness [ ] numbness  Hematologic/Lymphatic: [ ] anemia [ ] bleeding problem  Allergic/Immunologic: [ ] itchy eyes [ ] nasal discharge [ ] hives [ ] angioedema  [ ] All other systems negative  [ ] Unable to assess ROS because ________    OBJECTIVE:  ICU Vital Signs Last 24 Hrs  T(C): 38.3 (09 Aug 2024 07:30), Max: 38.7 (08 Aug 2024 23:30)  T(F): 100.9 (09 Aug 2024 07:30), Max: 101.7 (08 Aug 2024 23:30)  HR: 79 (09 Aug 2024 07:30) (60 - 87)  BP: 143/54 (09 Aug 2024 07:30) (77/49 - 146/58)  BP(mean): 80 (09 Aug 2024 07:30) (59 - 89)  ABP: --  ABP(mean): --  RR: 21 (09 Aug 2024 07:30) (17 - 35)  SpO2: 100% (09 Aug 2024 07:30) (97% - 100%)    O2 Parameters below as of 08 Aug 2024 16:45  Patient On (Oxygen Delivery Method): room air          Mode: AC/ CMV (Assist Control/ Continuous Mandatory Ventilation), RR (machine): 15, TV (machine): 450, FiO2: 40, PEEP: 5, ITime: 1, MAP: 17, PIP: 32    08-08 @ 07:01  -  08-09 @ 07:00  --------------------------------------------------------  IN: 2496.4 mL / OUT: 950 mL / NET: 1546.4 mL      CAPILLARY BLOOD GLUCOSE      POCT Blood Glucose.: 184 mg/dL (08 Aug 2024 21:04)      PHYSICAL EXAM:  General:   HEENT:   Neck:   Respiratory:   Cardiovascular:   Abdomen:   Extremities:   Skin:   Neurological:  Psychiatry:    LINES:    HOSPITAL MEDICATIONS:  MEDICATIONS  (STANDING):  artificial  tears Solution 1 Drop(s) Both EYES four times a day  aspirin  chewable 81 milliGRAM(s) Oral daily  chlorhexidine 0.12% Liquid 15 milliLiter(s) Oral Mucosa every 12 hours  dextrose 5%. 1000 milliLiter(s) (100 mL/Hr) IV Continuous <Continuous>  dextrose 5%. 1000 milliLiter(s) (50 mL/Hr) IV Continuous <Continuous>  dextrose 50% Injectable 12.5 Gram(s) IV Push once  dextrose 50% Injectable 25 Gram(s) IV Push once  dextrose 50% Injectable 25 Gram(s) IV Push once  doxazosin 2 milliGRAM(s) Oral at bedtime  enoxaparin Injectable 30 milliGRAM(s) SubCutaneous every 24 hours  escitalopram 10 milliGRAM(s) Oral daily  glucagon  Injectable 1 milliGRAM(s) IntraMuscular once  insulin glargine Injectable (LANTUS) 10 Unit(s) SubCutaneous at bedtime  insulin lispro (ADMELOG) corrective regimen sliding scale   SubCutaneous three times a day before meals  lactated ringers. 1000 milliLiter(s) (75 mL/Hr) IV Continuous <Continuous>  levETIRAcetam  IVPB 500 milliGRAM(s) IV Intermittent every 12 hours  norepinephrine Infusion 0.05 MICROgram(s)/kG/Min (6.78 mL/Hr) IV Continuous <Continuous>  pantoprazole   Suspension 40 milliGRAM(s) Oral daily  piperacillin/tazobactam IVPB.- 3.375 Gram(s) IV Intermittent once  piperacillin/tazobactam IVPB.- 3.375 Gram(s) IV Intermittent once  piperacillin/tazobactam IVPB.. 3.375 Gram(s) IV Intermittent every 8 hours  propofol Infusion. 20 MICROgram(s)/kG/Min (8.86 mL/Hr) IV Continuous <Continuous>  ticagrelor 60 milliGRAM(s) Enteral Tube two times a day  valproate sodium   IVPB 250 milliGRAM(s) IV Intermittent every 8 hours    MEDICATIONS  (PRN):  acetaminophen   Oral Liquid .. 650 milliGRAM(s) Oral every 6 hours PRN Temp greater or equal to 38C (100.4F), Mild Pain (1 - 3), Moderate Pain (4 - 6)  dextrose Oral Gel 15 Gram(s) Oral once PRN Blood Glucose LESS THAN 70 milliGRAM(s)/deciliter  ondansetron Injectable 4 milliGRAM(s) IV Push every 8 hours PRN Nausea and/or Vomiting      LABS:                        7.9    10.09 )-----------( 275      ( 09 Aug 2024 05:00 )             25.3     Hgb Trend: 7.9<--, 6.5<--, 6.3<--, 7.1<--, 7.8<--  08-09    145  |  108  |  92<H>  ----------------------------<  317<H>  4.2   |  27  |  2.22<H>    Ca    8.2<L>      09 Aug 2024 05:00  Phos  4.6     08-09  Mg     2.7     08-09    TPro  6.8  /  Alb  1.8<L>  /  TBili  0.2  /  DBili  x   /  AST  77<H>  /  ALT  48  /  AlkPhos  130<H>  08-09      Urinalysis Basic - ( 09 Aug 2024 05:00 )    Color: x / Appearance: x / SG: x / pH: x  Gluc: 317 mg/dL / Ketone: x  / Bili: x / Urobili: x   Blood: x / Protein: x / Nitrite: x   Leuk Esterase: x / RBC: x / WBC x   Sq Epi: x / Non Sq Epi: x / Bacteria: x      Arterial Blood Gas:  08-09 @ 01:30  7.38/45/427/27/99.0/1.3  ABG lactate: --  Arterial Blood Gas:  08-08 @ 23:44  7.22/62/41/25/51.6/-2.3  ABG lactate: --        MICROBIOLOGY:     RADIOLOGY:  [ ] Reviewed and interpreted by me CHIEF COMPLAINT:    Interval Events:  Tmax 101.3  noted to have myclonus, started on propofol, as well as keppra   given 2 pRBC    REVIEW OF SYSTEMS:  [ x] Unable to assess ROS because intubated/sedated    OBJECTIVE:  ICU Vital Signs Last 24 Hrs  T(C): 38.3 (09 Aug 2024 07:30), Max: 38.7 (08 Aug 2024 23:30)  T(F): 100.9 (09 Aug 2024 07:30), Max: 101.7 (08 Aug 2024 23:30)  HR: 79 (09 Aug 2024 07:30) (60 - 87)  BP: 143/54 (09 Aug 2024 07:30) (77/49 - 146/58)  BP(mean): 80 (09 Aug 2024 07:30) (59 - 89)  ABP: --  ABP(mean): --  RR: 21 (09 Aug 2024 07:30) (17 - 35)  SpO2: 100% (09 Aug 2024 07:30) (97% - 100%)    O2 Parameters below as of 08 Aug 2024 16:45  Patient On (Oxygen Delivery Method): room air          Mode: AC/ CMV (Assist Control/ Continuous Mandatory Ventilation), RR (machine): 15, TV (machine): 450, FiO2: 40, PEEP: 5, ITime: 1, MAP: 17, PIP: 32    08-08 @ 07:01  -  08-09 @ 07:00  --------------------------------------------------------  IN: 2496.4 mL / OUT: 950 mL / NET: 1546.4 mL      CAPILLARY BLOOD GLUCOSE      POCT Blood Glucose.: 184 mg/dL (08 Aug 2024 21:04)      PHYSICAL EXAM:  General: NAD, chronically ill appearing  HEENT: ETT in place  Neck: supple  Respiratory: coarse BS  Cardiovascular: s1s2 RRR  Abdomen: soft, mild distention, +PEG tube  Extremities: warm, trace pitting edema  Skin: stage 2 sacrum, L heel DTI  Neurological: unresponsive to name, does not withdraw to pain, no gag or corneal, small pupils and sluggish, overbreaths vent  Psychiatry: unable to assess    LINES:  [facility] 8/9-    HOSPITAL MEDICATIONS:  MEDICATIONS  (STANDING):  artificial  tears Solution 1 Drop(s) Both EYES four times a day  aspirin  chewable 81 milliGRAM(s) Oral daily  chlorhexidine 0.12% Liquid 15 milliLiter(s) Oral Mucosa every 12 hours  dextrose 5%. 1000 milliLiter(s) (100 mL/Hr) IV Continuous <Continuous>  dextrose 5%. 1000 milliLiter(s) (50 mL/Hr) IV Continuous <Continuous>  dextrose 50% Injectable 12.5 Gram(s) IV Push once  dextrose 50% Injectable 25 Gram(s) IV Push once  dextrose 50% Injectable 25 Gram(s) IV Push once  doxazosin 2 milliGRAM(s) Oral at bedtime  enoxaparin Injectable 30 milliGRAM(s) SubCutaneous every 24 hours  escitalopram 10 milliGRAM(s) Oral daily  glucagon  Injectable 1 milliGRAM(s) IntraMuscular once  insulin glargine Injectable (LANTUS) 10 Unit(s) SubCutaneous at bedtime  insulin lispro (ADMELOG) corrective regimen sliding scale   SubCutaneous three times a day before meals  lactated ringers. 1000 milliLiter(s) (75 mL/Hr) IV Continuous <Continuous>  levETIRAcetam  IVPB 500 milliGRAM(s) IV Intermittent every 12 hours  norepinephrine Infusion 0.05 MICROgram(s)/kG/Min (6.78 mL/Hr) IV Continuous <Continuous>  pantoprazole   Suspension 40 milliGRAM(s) Oral daily  piperacillin/tazobactam IVPB.- 3.375 Gram(s) IV Intermittent once  piperacillin/tazobactam IVPB.- 3.375 Gram(s) IV Intermittent once  piperacillin/tazobactam IVPB.. 3.375 Gram(s) IV Intermittent every 8 hours  propofol Infusion. 20 MICROgram(s)/kG/Min (8.86 mL/Hr) IV Continuous <Continuous>  ticagrelor 60 milliGRAM(s) Enteral Tube two times a day  valproate sodium   IVPB 250 milliGRAM(s) IV Intermittent every 8 hours    MEDICATIONS  (PRN):  acetaminophen   Oral Liquid .. 650 milliGRAM(s) Oral every 6 hours PRN Temp greater or equal to 38C (100.4F), Mild Pain (1 - 3), Moderate Pain (4 - 6)  dextrose Oral Gel 15 Gram(s) Oral once PRN Blood Glucose LESS THAN 70 milliGRAM(s)/deciliter  ondansetron Injectable 4 milliGRAM(s) IV Push every 8 hours PRN Nausea and/or Vomiting      LABS:                        7.9    10.09 )-----------( 275      ( 09 Aug 2024 05:00 )             25.3     Hgb Trend: 7.9<--, 6.5<--, 6.3<--, 7.1<--, 7.8<--  08-09    145  |  108  |  92<H>  ----------------------------<  317<H>  4.2   |  27  |  2.22<H>    Ca    8.2<L>      09 Aug 2024 05:00  Phos  4.6     08-09  Mg     2.7     08-09    TPro  6.8  /  Alb  1.8<L>  /  TBili  0.2  /  DBili  x   /  AST  77<H>  /  ALT  48  /  AlkPhos  130<H>  08-09      Urinalysis Basic - ( 09 Aug 2024 05:00 )    Color: x / Appearance: x / SG: x / pH: x  Gluc: 317 mg/dL / Ketone: x  / Bili: x / Urobili: x   Blood: x / Protein: x / Nitrite: x   Leuk Esterase: x / RBC: x / WBC x   Sq Epi: x / Non Sq Epi: x / Bacteria: x      Arterial Blood Gas:  08-09 @ 01:30  7.38/45/427/27/99.0/1.3  ABG lactate: --  Arterial Blood Gas:  08-08 @ 23:44  7.22/62/41/25/51.6/-2.3  ABG lactate: --        MICROBIOLOGY:     RADIOLOGY:  [ ] Reviewed and interpreted by me

## 2024-08-10 ENCOUNTER — RESULT REVIEW (OUTPATIENT)
Age: 89
End: 2024-08-10

## 2024-08-10 NOTE — PROGRESS NOTE ADULT - ASSESSMENT
89M w/ HTN, HLD, DM, HFrEF, CAD, s/p PPM, CKD, PVD, s/p CVA x3, s/p PEG, acute cholecystitis s/p cholecystostomy, prolonged hospital course dec 23-march 24 w/ COVID19 and multiple complications including tracheostomy has since been decannulated; has been declining recently and bedbound. Admitted w/ AMS in setting of MABLE and UTI. Course complicated by cardiac arrest x 13 mins likely due to aspiration. Transferred to ICU. 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 - overbreaths vent; initial EEG shows no seizures but positive for myoclonus, awaiting final read of VEEG and then can d/c; would favor remaining off propofol; continue keppra and valproate, f/u valproate levels  #CV - in shock state, likely post-arrest shock, improving, continue norepi and titrate to MAP >/65-70 for now; continue asa and ticagralor  #Pulm - intubated in setting of cardiac arrest, likely aspiration event as TF were reportedly suctioned out; duonebs for airway clearance; on minimal vent settings; SBT as tolerated, but unable to extubate due to poor mental status  #ID- currently on zosyn for likely aspiration pneumonia, pt does have a hx of ESBL in sputum but given seizures and medication interactions continue zosyn for now, continue vancomycin as pt has MRSA PCR+ and will de-escalate once sputum cx returns; sputum cx pending; all other cx NGTD  #Renal/metabolic - MABLE on CKD, likely post-arrest ATN; hypoK repleted, start free water for hyperNa; monitor I/Os and electrolytes  #GI- reports of leaking around PEG tube, start feeds via OGT; pt had BM, continue senna and miralax; continue PPI  #Heme - hgb stable s/p 2 pRBCs, no active bleeding noted  #Endo - switch to NPH, FS w/ ISS q6  #Skin - pressure ulcer unloading  #PPx - lovenox qd  #Dispo- remains in ICU ventilator dependent w/ poor neuro exam; prognosis for recovery is poor; full code for now    Plan of care discussed w/ pt's grandsons at bedside, all questions answered

## 2024-08-10 NOTE — PROGRESS NOTE ADULT - SUBJECTIVE AND OBJECTIVE BOX
CHIEF COMPLAINT:    Interval Events:    REVIEW OF SYSTEMS:  Constitutional: [ ] fevers [ ] chills [ ] weight loss [ ] weight gain  HEENT: [ ] dry eyes [ ] eye irritation [ ] postnasal drip [ ] nasal congestion  CV: [ ] chest pain [ ] orthopnea [ ] palpitations [ ] murmur  Resp: [ ] cough [ ] shortness of breath [ ] dyspnea [ ] wheezing [ ] sputum [ ] hemoptysis  GI: [ ] nausea [ ] vomiting [ ] diarrhea [ ] constipation [ ] abd pain [ ] dysphagia   : [ ] dysuria [ ] nocturia [ ] hematuria [ ] increased urinary frequency  Musculoskeletal: [ ] back pain [ ] myalgias [ ] arthralgias [ ] fracture  Skin: [ ] rash [ ] itch  Neurological: [ ] headache [ ] dizziness [ ] syncope [ ] weakness [ ] numbness  Hematologic/Lymphatic: [ ] anemia [ ] bleeding problem  Allergic/Immunologic: [ ] itchy eyes [ ] nasal discharge [ ] hives [ ] angioedema  [ ] All other systems negative  [ ] Unable to assess ROS because ________    OBJECTIVE:  ICU Vital Signs Last 24 Hrs  T(C): 37.8 (10 Aug 2024 07:00), Max: 38.3 (09 Aug 2024 08:00)  T(F): 100 (10 Aug 2024 07:00), Max: 100.9 (09 Aug 2024 08:00)  HR: 81 (10 Aug 2024 07:00) (63 - 97)  BP: 140/49 (10 Aug 2024 07:00) (117/83 - 155/119)  BP(mean): 76 (10 Aug 2024 07:00) (70 - 130)  ABP: --  ABP(mean): --  RR: 27 (10 Aug 2024 07:00) (13 - 31)  SpO2: 98% (10 Aug 2024 07:00) (95% - 100%)    O2 Parameters below as of 10 Aug 2024 07:00  Patient On (Oxygen Delivery Method): ventilator          Mode: CPAP with PS, FiO2: 40, PEEP: 5, ITime: 0.9, MAP: 8    08-09 @ 07:01  -  08-10 @ 07:00  --------------------------------------------------------  IN: 2236.3 mL / OUT: 1100 mL / NET: 1136.3 mL      CAPILLARY BLOOD GLUCOSE      POCT Blood Glucose.: 155 mg/dL (10 Aug 2024 05:24)      PHYSICAL EXAM:  General:   HEENT:   Neck:   Respiratory:   Cardiovascular:   Abdomen:   Extremities:   Skin:   Neurological:  Psychiatry:    LINES:    HOSPITAL MEDICATIONS:  MEDICATIONS  (STANDING):  albuterol/ipratropium for Nebulization 3 milliLiter(s) Nebulizer every 6 hours  artificial  tears Solution 1 Drop(s) Both EYES four times a day  aspirin  chewable 81 milliGRAM(s) Oral daily  chlorhexidine 0.12% Liquid 15 milliLiter(s) Oral Mucosa every 12 hours  chlorhexidine 2% Cloths 1 Application(s) Topical <User Schedule>  doxazosin 2 milliGRAM(s) Oral at bedtime  enoxaparin Injectable 30 milliGRAM(s) SubCutaneous every 24 hours  escitalopram 10 milliGRAM(s) Oral daily  insulin glargine Injectable (LANTUS) 10 Unit(s) SubCutaneous at bedtime  insulin lispro (ADMELOG) corrective regimen sliding scale   SubCutaneous every 6 hours  levETIRAcetam  IVPB 500 milliGRAM(s) IV Intermittent every 12 hours  norepinephrine Infusion 0.05 MICROgram(s)/kG/Min (6.78 mL/Hr) IV Continuous <Continuous>  pantoprazole   Suspension 40 milliGRAM(s) Oral daily  piperacillin/tazobactam IVPB.. 3.375 Gram(s) IV Intermittent every 8 hours  polyethylene glycol 3350 17 Gram(s) Oral daily  senna 2 Tablet(s) Oral at bedtime  ticagrelor 60 milliGRAM(s) Enteral Tube two times a day  valproate sodium   IVPB 500 milliGRAM(s) IV Intermittent every 12 hours    MEDICATIONS  (PRN):  acetaminophen   Oral Liquid .. 650 milliGRAM(s) Oral every 6 hours PRN Temp greater or equal to 38C (100.4F), Mild Pain (1 - 3), Moderate Pain (4 - 6)  sodium chloride 0.9% lock flush 10 milliLiter(s) IV Push every 1 hour PRN Pre/post blood products, medications, blood draw, and to maintain line patency      LABS:                        8.9    12.98 )-----------( 237      ( 10 Aug 2024 03:00 )             27.7     Hgb Trend: 8.9<--, 9.6<--, 7.9<--, 6.5<--, 6.3<--  08-10    146<H>  |  112<H>  |  67<H>  ----------------------------<  180<H>  3.3<L>   |  28  |  1.92<H>    Ca    8.0<L>      10 Aug 2024 03:00  Phos  3.3     08-10  Mg     2.6     08-10    TPro  6.6  /  Alb  1.6<L>  /  TBili  0.4  /  DBili  x   /  AST  38<H>  /  ALT  29  /  AlkPhos  49  08-10      Urinalysis Basic - ( 10 Aug 2024 03:00 )    Color: x / Appearance: x / SG: x / pH: x  Gluc: 180 mg/dL / Ketone: x  / Bili: x / Urobili: x   Blood: x / Protein: x / Nitrite: x   Leuk Esterase: x / RBC: x / WBC x   Sq Epi: x / Non Sq Epi: x / Bacteria: x      Arterial Blood Gas:  08-09 @ 01:30  7.38/45/427/27/99.0/1.3  ABG lactate: --  Arterial Blood Gas:  08-08 @ 23:44  7.22/62/41/25/51.6/-2.3  ABG lactate: --        MICROBIOLOGY:     RADIOLOGY:  [ ] Reviewed and interpreted by me CHIEF COMPLAINT:    Interval Events:  prelim EEG shows no seizures, showing myoclonus  Tmax 100.9  decreasing doses of pressors    REVIEW OF SYSTEMS:  [ x] Unable to assess ROS because intubated/unresponsive    OBJECTIVE:  ICU Vital Signs Last 24 Hrs  T(C): 37.8 (10 Aug 2024 07:00), Max: 38.3 (09 Aug 2024 08:00)  T(F): 100 (10 Aug 2024 07:00), Max: 100.9 (09 Aug 2024 08:00)  HR: 81 (10 Aug 2024 07:00) (63 - 97)  BP: 140/49 (10 Aug 2024 07:00) (117/83 - 155/119)  BP(mean): 76 (10 Aug 2024 07:00) (70 - 130)  ABP: --  ABP(mean): --  RR: 27 (10 Aug 2024 07:00) (13 - 31)  SpO2: 98% (10 Aug 2024 07:00) (95% - 100%)    O2 Parameters below as of 10 Aug 2024 07:00  Patient On (Oxygen Delivery Method): ventilator          Mode: CPAP with PS, FiO2: 40, PEEP: 5, ITime: 0.9, MAP: 8    08-09 @ 07:01  -  08-10 @ 07:00  --------------------------------------------------------  IN: 2236.3 mL / OUT: 1100 mL / NET: 1136.3 mL      CAPILLARY BLOOD GLUCOSE      POCT Blood Glucose.: 155 mg/dL (10 Aug 2024 05:24)      PHYSICAL EXAM:  General: NAD, chronically ill appearing  HEENT: ETT in place  Neck: supple  Respiratory: coarse BS  Cardiovascular: s1s2 RRR  Abdomen: soft, mild distention, +PEG tube  Extremities: warm, trace pitting edema  Skin: stage 2 sacrum, L heel DTI  Neurological: unresponsive to name, does not withdraw to pain, no gag or corneal, small pupils and sluggish, overbreaths vent  Psychiatry: unable to assess    LINES:  [facility] TLC 8/9-    HOSPITAL MEDICATIONS:  MEDICATIONS  (STANDING):  albuterol/ipratropium for Nebulization 3 milliLiter(s) Nebulizer every 6 hours  artificial  tears Solution 1 Drop(s) Both EYES four times a day  aspirin  chewable 81 milliGRAM(s) Oral daily  chlorhexidine 0.12% Liquid 15 milliLiter(s) Oral Mucosa every 12 hours  chlorhexidine 2% Cloths 1 Application(s) Topical <User Schedule>  doxazosin 2 milliGRAM(s) Oral at bedtime  enoxaparin Injectable 30 milliGRAM(s) SubCutaneous every 24 hours  escitalopram 10 milliGRAM(s) Oral daily  insulin glargine Injectable (LANTUS) 10 Unit(s) SubCutaneous at bedtime  insulin lispro (ADMELOG) corrective regimen sliding scale   SubCutaneous every 6 hours  levETIRAcetam  IVPB 500 milliGRAM(s) IV Intermittent every 12 hours  norepinephrine Infusion 0.05 MICROgram(s)/kG/Min (6.78 mL/Hr) IV Continuous <Continuous>  pantoprazole   Suspension 40 milliGRAM(s) Oral daily  piperacillin/tazobactam IVPB.. 3.375 Gram(s) IV Intermittent every 8 hours  polyethylene glycol 3350 17 Gram(s) Oral daily  senna 2 Tablet(s) Oral at bedtime  ticagrelor 60 milliGRAM(s) Enteral Tube two times a day  valproate sodium   IVPB 500 milliGRAM(s) IV Intermittent every 12 hours    MEDICATIONS  (PRN):  acetaminophen   Oral Liquid .. 650 milliGRAM(s) Oral every 6 hours PRN Temp greater or equal to 38C (100.4F), Mild Pain (1 - 3), Moderate Pain (4 - 6)  sodium chloride 0.9% lock flush 10 milliLiter(s) IV Push every 1 hour PRN Pre/post blood products, medications, blood draw, and to maintain line patency      LABS:                        8.9    12.98 )-----------( 237      ( 10 Aug 2024 03:00 )             27.7     Hgb Trend: 8.9<--, 9.6<--, 7.9<--, 6.5<--, 6.3<--  08-10    146<H>  |  112<H>  |  67<H>  ----------------------------<  180<H>  3.3<L>   |  28  |  1.92<H>    Ca    8.0<L>      10 Aug 2024 03:00  Phos  3.3     08-10  Mg     2.6     08-10    TPro  6.6  /  Alb  1.6<L>  /  TBili  0.4  /  DBili  x   /  AST  38<H>  /  ALT  29  /  AlkPhos  49  08-10      Urinalysis Basic - ( 10 Aug 2024 03:00 )    Color: x / Appearance: x / SG: x / pH: x  Gluc: 180 mg/dL / Ketone: x  / Bili: x / Urobili: x   Blood: x / Protein: x / Nitrite: x   Leuk Esterase: x / RBC: x / WBC x   Sq Epi: x / Non Sq Epi: x / Bacteria: x      Arterial Blood Gas:  08-09 @ 01:30  7.38/45/427/27/99.0/1.3  ABG lactate: --  Arterial Blood Gas:  08-08 @ 23:44  7.22/62/41/25/51.6/-2.3  ABG lactate: --        MICROBIOLOGY:   Culture - Sputum . (08.09.24 @ 14:00)    Gram Stain:   Moderate polymorphonuclear leukocytes per low power field  No Squamous epithelial cells per low power field  Rare Yeast like cells per oil power field  Rare Gram positive cocci in pairs per oil power field  Rare Gram Positive Rods per oil power field   Specimen Source: ET Tube ET Tube   Culture Results:   Normal Respiratory Aurelia present        RADIOLOGY:  [ ] Reviewed and interpreted by me

## 2024-08-10 NOTE — EEG REPORT - NS EEG TEXT BOX
SHAIK DONOHUE [id number]     Study Date: 		08-09-24 to 08-10-24  Duration x Hours: 18 hours 35 minutes  --------------------------------------------------------------------------------------------------  History:  HPI:  89 years old male with h/o HTN, HLD, DM, HFrEF, CAD s/p CABG s/p stents ( last stent in 05/2022), s/p PPM, CKD, PVD, h/o CVA x 3, s/p PEG, acute cholecystitis s/p cholecystostomy ( not surgical candidate, s/p cholecystomy tube removal ? 8/1) was brought in to ED with decreased responsiveness. Patient is bedbound, minimally verbal, A&OX1 baseline. Patient was noted to be unresponsive last night.   Had episode of low BP, sat well at RA, afebrile. No leukocytosis, plt 283, lactate 3.7---> 2.8, Cr 2.65 ( 1.58 in 07/2024), CT chest/abd/pelvis with no acute pathology. Stable bilateral pleural effusions and comrpessive atelectasis. CT head with no acute pathology. No significant change compared with 3/16/2024. (07 Aug 2024 11:32)      MEDICATIONS  (STANDING):  aspirin  chewable 81 milliGRAM(s) Oral daily  doxazosin 2 milliGRAM(s) Oral at bedtime  enoxaparin Injectable 30 milliGRAM(s) SubCutaneous every 24 hours  escitalopram 10 milliGRAM(s) Oral daily  levETIRAcetam  IVPB 500 milliGRAM(s) IV Intermittent every 12 hours  norepinephrine Infusion 0.05 MICROgram(s)/kG/Min (6.78 mL/Hr) IV Continuous <Continuous>  pantoprazole   Suspension 40 milliGRAM(s) Oral daily  piperacillin/tazobactam IVPB.. 3.375 Gram(s) IV Intermittent every 8 hours  ticagrelor 60 milliGRAM(s) Enteral Tube two times a day  valproate sodium   IVPB 500 milliGRAM(s) IV Intermittent every 12 hours    --------------------------------------------------------------------------------------------------  Study Interpretation:    [[[Abbreviation Key:  PDR=alpha rhythm/posterior dominant rhythm. A-P=anterior posterior gradient.  Amplitude: ‘very low’:<20; ‘low’:20-50; ‘medium’:; ‘high’:>200uV.  Persistence for periodic/rhythmic patterns (% of epoch) ‘rare’:<1%; ‘occasional’:1-10%; ‘frequent’:10-50%; ‘abundant’:50-90%; ‘continuous’:>90%.  Persistence for sporadic discharges: ‘rare’:<1/hr; ‘occasional’:1/min-1/hr; ‘frequent’:>1/min; ‘abundant’:>1/10 sec.  GRDA=generalized rhythmic delta activity; FIRDA=frontal intermittent GRDA; LRDA=lateralized rhythmic delta activity; TIRDA=temporal intermittent rhythmic delta activity;  LPD=PLED=lateralized periodic discharges; GPD=generalized periodic discharges; BiPDs=BiPLEDs=bilateral independent periodic epileptiform discharges; SIRPID=stimulus induced rhythmic, periodic, or ictal appearing discharges; BIRDs=brief potentially ictal rhythmic discharges >4 Hz, lasting .5-10s; PFA=paroxysmal bursts of beta/gamma; LVFA=low voltage fast activity.  Modifiers: +F=with fast component; +S=with spike component; +R=with rhythmic component.  S-B=burst suppression pattern.  Max=maximal. N1-drowsy; N2-stage II sleep; N3-slow wave sleep. SSS/BETS=small sharp spikes/benign epileptiform transients of sleep. HV=hyperventilation; PS=photic stimulation]]]    Daily EEG Visual Analysis    FINDINGS:      Background:  Continuity: suppressed  Symmetry: symmetric  PDR: not discernable  Reactivity: absent  Voltage: normal, less than 10  Anterior Posterior Gradient: absent  Other background findings:   Breach: absent    Background Slowing:  Generalized slowing: none was present.  Focal slowing: none was present.    State Changes:   -Absent    Sporadic Epileptiform Discharges:    None    Rhythmic and Periodic Patterns (RPPs):  None     Electrographic and Electroclinical seizures:  None    Other Clinical Events:  None    Activation Procedures:   -Hyperventilation was not performed.    -Photic stimulation was not performed.    Artifacts:  Intermittent myogenic and movement artifacts were noted.    ECG:  The heart rate on single channel ECG was predominantly between 70-80 BPM.    EEG Classification / Summary:  Abnormal  EEG in the awake  1. Voltage suppression, diffuse, severe    Clinical Impression:  1. Voltage suppression, indicative of sedative effect or diffuse anoxic brain injury  2. There were no epileptiform abnormalities recorded.      THIS IS A PRELIMINARY INTERPRETATION ONLY PENDING ATTENDING REVIEW/ATTESTATION    Swathi Murrieta DO  Epilepsy Fellow, PGY-5    -------------------------------------------------------------------------------------------------------  [facility] EEG Reading Room Ph#: [phone]  Epilepsy Answering Service after 5PM and before 8:30AM: Ph#: [phone]       SHAIK DONOHUE [id number]     Study Date: 		08-09-24 to 08-10-24  Duration x Hours: 18 hours 35 minutes  --------------------------------------------------------------------------------------------------  History:  HPI:  89 years old male with h/o HTN, HLD, DM, HFrEF, CAD s/p CABG s/p stents ( last stent in 05/2022), s/p PPM, CKD, PVD, h/o CVA x 3, s/p PEG, acute cholecystitis s/p cholecystostomy ( not surgical candidate, s/p cholecystomy tube removal ? 8/1) was brought in to ED with decreased responsiveness. Patient is bedbound, minimally verbal, A&OX1 baseline. Patient was noted to be unresponsive last night.   Had episode of low BP, sat well at RA, afebrile. No leukocytosis, plt 283, lactate 3.7---> 2.8, Cr 2.65 ( 1.58 in 07/2024), CT chest/abd/pelvis with no acute pathology. Stable bilateral pleural effusions and comrpessive atelectasis. CT head with no acute pathology. No significant change compared with 3/16/2024. (07 Aug 2024 11:32)      MEDICATIONS  (STANDING):  aspirin  chewable 81 milliGRAM(s) Oral daily  doxazosin 2 milliGRAM(s) Oral at bedtime  enoxaparin Injectable 30 milliGRAM(s) SubCutaneous every 24 hours  escitalopram 10 milliGRAM(s) Oral daily  levETIRAcetam  IVPB 500 milliGRAM(s) IV Intermittent every 12 hours  norepinephrine Infusion 0.05 MICROgram(s)/kG/Min (6.78 mL/Hr) IV Continuous <Continuous>  pantoprazole   Suspension 40 milliGRAM(s) Oral daily  piperacillin/tazobactam IVPB.. 3.375 Gram(s) IV Intermittent every 8 hours  ticagrelor 60 milliGRAM(s) Enteral Tube two times a day  valproate sodium   IVPB 500 milliGRAM(s) IV Intermittent every 12 hours    --------------------------------------------------------------------------------------------------  Study Interpretation:    [[[Abbreviation Key:  PDR=alpha rhythm/posterior dominant rhythm. A-P=anterior posterior gradient.  Amplitude: ‘very low’:<20; ‘low’:20-50; ‘medium’:; ‘high’:>200uV.  Persistence for periodic/rhythmic patterns (% of epoch) ‘rare’:<1%; ‘occasional’:1-10%; ‘frequent’:10-50%; ‘abundant’:50-90%; ‘continuous’:>90%.  Persistence for sporadic discharges: ‘rare’:<1/hr; ‘occasional’:1/min-1/hr; ‘frequent’:>1/min; ‘abundant’:>1/10 sec.  GRDA=generalized rhythmic delta activity; FIRDA=frontal intermittent GRDA; LRDA=lateralized rhythmic delta activity; TIRDA=temporal intermittent rhythmic delta activity;  LPD=PLED=lateralized periodic discharges; GPD=generalized periodic discharges; BiPDs=BiPLEDs=bilateral independent periodic epileptiform discharges; SIRPID=stimulus induced rhythmic, periodic, or ictal appearing discharges; BIRDs=brief potentially ictal rhythmic discharges >4 Hz, lasting .5-10s; PFA=paroxysmal bursts of beta/gamma; LVFA=low voltage fast activity.  Modifiers: +F=with fast component; +S=with spike component; +R=with rhythmic component.  S-B=burst suppression pattern.  Max=maximal. N1-drowsy; N2-stage II sleep; N3-slow wave sleep. SSS/BETS=small sharp spikes/benign epileptiform transients of sleep. HV=hyperventilation; PS=photic stimulation]]]    Daily EEG Visual Analysis    FINDINGS:      Background:  Continuity: suppressed  Symmetry: symmetric  PDR: not discernable  Reactivity: absent  Voltage: normal, less than 10  Anterior Posterior Gradient: absent  Other background findings:   Breach: absent    Background Slowing:  Generalized slowing: none was present.  Focal slowing: none was present.    State Changes:   -Absent    Sporadic Epileptiform Discharges:    None    Rhythmic and Periodic Patterns (RPPs):  None     Electrographic and Electroclinical seizures:  None    Other Clinical Events:  None    Activation Procedures:   -Hyperventilation was not performed.    -Photic stimulation was not performed.    Artifacts:  Intermittent myogenic and movement artifacts were noted, at times correlating with clinical extremity jerking.     ECG:  The heart rate on single channel ECG was predominantly between 70-80 BPM.    EEG Classification / Summary:  Abnormal  EEG in the awake  1. Voltage suppression, diffuse, severe    Clinical Impression:  1. Voltage suppression, indicative of sedative effect or diffuse anoxic brain injury  2. There were no epileptiform abnormalities recorded.      THIS IS A PRELIMINARY INTERPRETATION ONLY PENDING ATTENDING REVIEW/ATTESTATION    Swathi Murrieta DO  Epilepsy Fellow, PGY-5    -------------------------------------------------------------------------------------------------------  [facility] EEG Reading Room Ph#: [phone]  Epilepsy Answering Service after 5PM and before 8:30AM: Ph#: [phone]       SHAIK DONOHUE [id number]     Study Date: 		08-09-24 to 08-10-24  Duration x Hours: 18 hours 35 minutes  --------------------------------------------------------------------------------------------------  History:  HPI:  89 years old male with h/o HTN, HLD, DM, HFrEF, CAD s/p CABG s/p stents ( last stent in 05/2022), s/p PPM, CKD, PVD, h/o CVA x 3, s/p PEG, acute cholecystitis s/p cholecystostomy ( not surgical candidate, s/p cholecystomy tube removal ? 8/1) was brought in to ED with decreased responsiveness. Patient is bedbound, minimally verbal, A&OX1 baseline. Patient was noted to be unresponsive last night.   Had episode of low BP, sat well at RA, afebrile. No leukocytosis, plt 283, lactate 3.7---> 2.8, Cr 2.65 ( 1.58 in 07/2024), CT chest/abd/pelvis with no acute pathology. Stable bilateral pleural effusions and comrpessive atelectasis. CT head with no acute pathology. No significant change compared with 3/16/2024. (07 Aug 2024 11:32)      MEDICATIONS  (STANDING):  aspirin  chewable 81 milliGRAM(s) Oral daily  doxazosin 2 milliGRAM(s) Oral at bedtime  enoxaparin Injectable 30 milliGRAM(s) SubCutaneous every 24 hours  escitalopram 10 milliGRAM(s) Oral daily  levETIRAcetam  IVPB 500 milliGRAM(s) IV Intermittent every 12 hours  norepinephrine Infusion 0.05 MICROgram(s)/kG/Min (6.78 mL/Hr) IV Continuous <Continuous>  pantoprazole   Suspension 40 milliGRAM(s) Oral daily  piperacillin/tazobactam IVPB.. 3.375 Gram(s) IV Intermittent every 8 hours  ticagrelor 60 milliGRAM(s) Enteral Tube two times a day  valproate sodium   IVPB 500 milliGRAM(s) IV Intermittent every 12 hours    --------------------------------------------------------------------------------------------------  Study Interpretation:    [[[Abbreviation Key:  PDR=alpha rhythm/posterior dominant rhythm. A-P=anterior posterior gradient.  Amplitude: ‘very low’:<20; ‘low’:20-50; ‘medium’:; ‘high’:>200uV.  Persistence for periodic/rhythmic patterns (% of epoch) ‘rare’:<1%; ‘occasional’:1-10%; ‘frequent’:10-50%; ‘abundant’:50-90%; ‘continuous’:>90%.  Persistence for sporadic discharges: ‘rare’:<1/hr; ‘occasional’:1/min-1/hr; ‘frequent’:>1/min; ‘abundant’:>1/10 sec.  GRDA=generalized rhythmic delta activity; FIRDA=frontal intermittent GRDA; LRDA=lateralized rhythmic delta activity; TIRDA=temporal intermittent rhythmic delta activity;  LPD=PLED=lateralized periodic discharges; GPD=generalized periodic discharges; BiPDs=BiPLEDs=bilateral independent periodic epileptiform discharges; SIRPID=stimulus induced rhythmic, periodic, or ictal appearing discharges; BIRDs=brief potentially ictal rhythmic discharges >4 Hz, lasting .5-10s; PFA=paroxysmal bursts of beta/gamma; LVFA=low voltage fast activity.  Modifiers: +F=with fast component; +S=with spike component; +R=with rhythmic component.  S-B=burst suppression pattern.  Max=maximal. N1-drowsy; N2-stage II sleep; N3-slow wave sleep. SSS/BETS=small sharp spikes/benign epileptiform transients of sleep. HV=hyperventilation; PS=photic stimulation]]]    Daily EEG Visual Analysis    FINDINGS:      Background:  Continuity: suppressed  Symmetry: symmetric  PDR: not discernable  Reactivity: absent  Voltage: normal, less than 10  Anterior Posterior Gradient: absent  Other background findings:   Breach: absent    Background Slowing:  Generalized slowing: none was present.  Focal slowing: none was present.    State Changes:   -Absent    Sporadic Epileptiform Discharges:    None    Rhythmic and Periodic Patterns (RPPs):  None     Electrographic and Electroclinical seizures:  None    Other Clinical Events:  None    Activation Procedures:   -Hyperventilation was not performed.    -Photic stimulation was not performed.    Artifacts:  Intermittent myogenic and movement artifacts were noted, at times correlating with clinical extremity jerking.     ECG:  The heart rate on single channel ECG was predominantly between 70-80 BPM.    EEG Classification / Summary:  Abnormal  EEG in the awake  1. Voltage suppression, diffuse, severe    Clinical Impression:  1. Severe diffuse cerebral dysfunction that may be the result of either diffuse anoxic brain injury or sedative medication effects.  2. There were no epileptiform abnormalities recorded.      Swathi Murrieta DO  Epilepsy Fellow, PGY-5    Rafy Kulkarni MD PhD  Director, Epilepsy Division, [facility] EEG Reading Room Ph#: [phone]  Epilepsy Answering Service after 5PM and before 8:30AM: Ph#: [phone]     -------------------------------------------------------------------------------------------------------  [facility] EEG Reading Room Ph#: [phone]  Epilepsy Answering Service after 5PM and before 8:30AM: Ph#: [phone]

## 2024-08-11 NOTE — PROGRESS NOTE ADULT - ASSESSMENT
89M w/ HTN, HLD, DM, HFrEF, CAD, s/p PPM, CKD, PVD, s/p CVA x3, s/p PEG, acute cholecystitis s/p cholecystostomy, prolonged hospital course dec 23-march 24 w/ COVID19 and multiple complications including tracheostomy has since been decannulated; has been declining recently and bedbound. Admitted w/ AMS in setting of MABLE and UTI. Course complicated by cardiac arrest x 13 mins likely due to aspiration. Transferred to ICU. 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 - overbreaths vent; EEG shows no evidence of seizures but showing diffuse severe slowing; continue keppra and valproate, f/u valproate levels; family requesting MRI - will attempt to get one once off all drips  #CV - in shock state, likely post-arrest shock, improving, add midodrine, continue norepi and titrate to MAP >/65-70 for now; continue asa and ticagralor  #Pulm - intubated in setting of cardiac arrest, likely aspiration event as TF were reportedly suctioned out; duonebs for airway clearance; on minimal vent settings; SBT as tolerated, but unable to extubate due to poor mental status  #ID- currently on zosyn for likely aspiration pneumonia, sputum growing pseudomonas, awaiting final susceptibilities, can d/c vancomycin as no MRSA growing; all other cx NGTD  #Renal/metabolic - MABLE on CKD, likely post-arrest ATN; hypoK repleted, continue free water for hyperNa; monitor I/Os and electrolytes  #GI- reports of leaking around PEG tube, continue feeds via OGT; continue bowel regimen and PPI  #Heme - hgb stable s/p 2 pRBCs, no active bleeding noted  #Endo - increase NPH, FS w/ ISS q6  #Skin - pressure ulcer unloading  #PPx - lovenox qd  #Dispo- remains in ICU ventilator dependent w/ poor neuro exam; prognosis for recovery is poor; full code for now    Plan of care discussed w/ pt's grandsons at bedside, all questions answered

## 2024-08-11 NOTE — PROGRESS NOTE ADULT - SUBJECTIVE AND OBJECTIVE BOX
CHIEF COMPLAINT:    Interval Events:    REVIEW OF SYSTEMS:  Constitutional: [ ] fevers [ ] chills [ ] weight loss [ ] weight gain  HEENT: [ ] dry eyes [ ] eye irritation [ ] postnasal drip [ ] nasal congestion  CV: [ ] chest pain [ ] orthopnea [ ] palpitations [ ] murmur  Resp: [ ] cough [ ] shortness of breath [ ] dyspnea [ ] wheezing [ ] sputum [ ] hemoptysis  GI: [ ] nausea [ ] vomiting [ ] diarrhea [ ] constipation [ ] abd pain [ ] dysphagia   : [ ] dysuria [ ] nocturia [ ] hematuria [ ] increased urinary frequency  Musculoskeletal: [ ] back pain [ ] myalgias [ ] arthralgias [ ] fracture  Skin: [ ] rash [ ] itch  Neurological: [ ] headache [ ] dizziness [ ] syncope [ ] weakness [ ] numbness  Hematologic/Lymphatic: [ ] anemia [ ] bleeding problem  Allergic/Immunologic: [ ] itchy eyes [ ] nasal discharge [ ] hives [ ] angioedema  [ ] All other systems negative  [ ] Unable to assess ROS because ________    OBJECTIVE:  ICU Vital Signs Last 24 Hrs  T(C): 38.4 (11 Aug 2024 07:30), Max: 39 (10 Aug 2024 13:00)  T(F): 101.1 (11 Aug 2024 07:30), Max: 102.2 (10 Aug 2024 13:00)  HR: 83 (11 Aug 2024 07:30) (69 - 113)  BP: 137/53 (11 Aug 2024 07:30) (113/57 - 151/51)  BP(mean): 78 (11 Aug 2024 07:30) (66 - 87)  ABP: --  ABP(mean): --  RR: 17 (11 Aug 2024 07:30) (0 - 29)  SpO2: 100% (11 Aug 2024 07:30) (97% - 100%)    O2 Parameters below as of 11 Aug 2024 06:50  Patient On (Oxygen Delivery Method): ventilator          Mode: AC/ CMV (Assist Control/ Continuous Mandatory Ventilation), RR (machine): 15, TV (machine): 450, FiO2: 30, PEEP: 5, ITime: 1, MAP: 9, PIP: 17    08-10 @ 07:01  -  08-11 @ 07:00  --------------------------------------------------------  IN: 1467.5 mL / OUT: 750 mL / NET: 717.5 mL      CAPILLARY BLOOD GLUCOSE      POCT Blood Glucose.: 159 mg/dL (11 Aug 2024 05:49)      PHYSICAL EXAM:  General:   HEENT:   Neck:   Respiratory:   Cardiovascular:   Abdomen:   Extremities:   Skin:   Neurological:  Psychiatry:    LINES:    HOSPITAL MEDICATIONS:  MEDICATIONS  (STANDING):  albuterol/ipratropium for Nebulization 3 milliLiter(s) Nebulizer every 6 hours  artificial  tears Solution 1 Drop(s) Both EYES four times a day  aspirin  chewable 81 milliGRAM(s) Oral daily  chlorhexidine 0.12% Liquid 15 milliLiter(s) Oral Mucosa every 12 hours  chlorhexidine 2% Cloths 1 Application(s) Topical <User Schedule>  doxazosin 2 milliGRAM(s) Oral at bedtime  enoxaparin Injectable 30 milliGRAM(s) SubCutaneous every 24 hours  escitalopram 10 milliGRAM(s) Oral daily  insulin lispro (ADMELOG) corrective regimen sliding scale   SubCutaneous every 6 hours  insulin NPH human recombinant 6 Unit(s) SubCutaneous every 6 hours  levETIRAcetam  IVPB 500 milliGRAM(s) IV Intermittent every 12 hours  norepinephrine Infusion 0.05 MICROgram(s)/kG/Min (6.78 mL/Hr) IV Continuous <Continuous>  pantoprazole   Suspension 40 milliGRAM(s) Oral daily  piperacillin/tazobactam IVPB.. 3.375 Gram(s) IV Intermittent every 8 hours  polyethylene glycol 3350 17 Gram(s) Oral daily  senna 2 Tablet(s) Oral at bedtime  ticagrelor 60 milliGRAM(s) Enteral Tube two times a day  valproate sodium   IVPB 500 milliGRAM(s) IV Intermittent every 12 hours    MEDICATIONS  (PRN):  acetaminophen   Oral Liquid .. 650 milliGRAM(s) Oral every 6 hours PRN Temp greater or equal to 38C (100.4F), Mild Pain (1 - 3), Moderate Pain (4 - 6)  sodium chloride 0.9% lock flush 10 milliLiter(s) IV Push every 1 hour PRN Pre/post blood products, medications, blood draw, and to maintain line patency      LABS:                        8.1    10.89 )-----------( 195      ( 11 Aug 2024 03:30 )             25.7     Hgb Trend: 8.1<--, 8.9<--, 9.6<--, 7.9<--, 6.5<--  08-11    146<H>  |  113<H>  |  53<H>  ----------------------------<  186<H>  3.1<L>   |  24  |  1.71<H>    Ca    8.1<L>      11 Aug 2024 03:30  Phos  2.5     08-11  Mg     2.6     08-11    TPro  6.4  /  Alb  1.4<L>  /  TBili  0.4  /  DBili  x   /  AST  24  /  ALT  19  /  AlkPhos  54  08-11      Urinalysis Basic - ( 11 Aug 2024 03:30 )    Color: x / Appearance: x / SG: x / pH: x  Gluc: 186 mg/dL / Ketone: x  / Bili: x / Urobili: x   Blood: x / Protein: x / Nitrite: x   Leuk Esterase: x / RBC: x / WBC x   Sq Epi: x / Non Sq Epi: x / Bacteria: x            MICROBIOLOGY:     RADIOLOGY:  [ ] Reviewed and interpreted by me CHIEF COMPLAINT:    Interval Events:  Tmax 100.9  decreasing doses of pressors    REVIEW OF SYSTEMS:  [x ] Unable to assess ROS because unresponsive    OBJECTIVE:  ICU Vital Signs Last 24 Hrs  T(C): 38.4 (11 Aug 2024 07:30), Max: 39 (10 Aug 2024 13:00)  T(F): 101.1 (11 Aug 2024 07:30), Max: 102.2 (10 Aug 2024 13:00)  HR: 83 (11 Aug 2024 07:30) (69 - 113)  BP: 137/53 (11 Aug 2024 07:30) (113/57 - 151/51)  BP(mean): 78 (11 Aug 2024 07:30) (66 - 87)  ABP: --  ABP(mean): --  RR: 17 (11 Aug 2024 07:30) (0 - 29)  SpO2: 100% (11 Aug 2024 07:30) (97% - 100%)    O2 Parameters below as of 11 Aug 2024 06:50  Patient On (Oxygen Delivery Method): ventilator          Mode: AC/ CMV (Assist Control/ Continuous Mandatory Ventilation), RR (machine): 15, TV (machine): 450, FiO2: 30, PEEP: 5, ITime: 1, MAP: 9, PIP: 17    08-10 @ 07:01  -  08-11 @ 07:00  --------------------------------------------------------  IN: 1467.5 mL / OUT: 750 mL / NET: 717.5 mL      CAPILLARY BLOOD GLUCOSE      POCT Blood Glucose.: 159 mg/dL (11 Aug 2024 05:49)      PHYSICAL EXAM:  General: NAD, chronically ill appearing  HEENT: ETT in place  Neck: supple  Respiratory: coarse BS  Cardiovascular: s1s2 RRR  Abdomen: soft, mild distention, +PEG tube  Extremities: warm, trace pitting edema  Skin: stage 2 sacrum, L heel DTI  Neurological: unresponsive to name, does not withdraw to pain, no gag (coughs w/ suctioning but no gag) or corneals, small pupils and sluggish, overbreaths vent  Psychiatry: unable to assess    LINES:  [facility] TLC 8/9-    HOSPITAL MEDICATIONS:  MEDICATIONS  (STANDING):  albuterol/ipratropium for Nebulization 3 milliLiter(s) Nebulizer every 6 hours  artificial  tears Solution 1 Drop(s) Both EYES four times a day  aspirin  chewable 81 milliGRAM(s) Oral daily  chlorhexidine 0.12% Liquid 15 milliLiter(s) Oral Mucosa every 12 hours  chlorhexidine 2% Cloths 1 Application(s) Topical <User Schedule>  doxazosin 2 milliGRAM(s) Oral at bedtime  enoxaparin Injectable 30 milliGRAM(s) SubCutaneous every 24 hours  escitalopram 10 milliGRAM(s) Oral daily  insulin lispro (ADMELOG) corrective regimen sliding scale   SubCutaneous every 6 hours  insulin NPH human recombinant 6 Unit(s) SubCutaneous every 6 hours  levETIRAcetam  IVPB 500 milliGRAM(s) IV Intermittent every 12 hours  norepinephrine Infusion 0.05 MICROgram(s)/kG/Min (6.78 mL/Hr) IV Continuous <Continuous>  pantoprazole   Suspension 40 milliGRAM(s) Oral daily  piperacillin/tazobactam IVPB.. 3.375 Gram(s) IV Intermittent every 8 hours  polyethylene glycol 3350 17 Gram(s) Oral daily  senna 2 Tablet(s) Oral at bedtime  ticagrelor 60 milliGRAM(s) Enteral Tube two times a day  valproate sodium   IVPB 500 milliGRAM(s) IV Intermittent every 12 hours    MEDICATIONS  (PRN):  acetaminophen   Oral Liquid .. 650 milliGRAM(s) Oral every 6 hours PRN Temp greater or equal to 38C (100.4F), Mild Pain (1 - 3), Moderate Pain (4 - 6)  sodium chloride 0.9% lock flush 10 milliLiter(s) IV Push every 1 hour PRN Pre/post blood products, medications, blood draw, and to maintain line patency      LABS:                        8.1    10.89 )-----------( 195      ( 11 Aug 2024 03:30 )             25.7     Hgb Trend: 8.1<--, 8.9<--, 9.6<--, 7.9<--, 6.5<--  08-11    146<H>  |  113<H>  |  53<H>  ----------------------------<  186<H>  3.1<L>   |  24  |  1.71<H>    Ca    8.1<L>      11 Aug 2024 03:30  Phos  2.5     08-11  Mg     2.6     08-11    TPro  6.4  /  Alb  1.4<L>  /  TBili  0.4  /  DBili  x   /  AST  24  /  ALT  19  /  AlkPhos  54  08-11      Urinalysis Basic - ( 11 Aug 2024 03:30 )    Color: x / Appearance: x / SG: x / pH: x  Gluc: 186 mg/dL / Ketone: x  / Bili: x / Urobili: x   Blood: x / Protein: x / Nitrite: x   Leuk Esterase: x / RBC: x / WBC x   Sq Epi: x / Non Sq Epi: x / Bacteria: x            MICROBIOLOGY:   Culture - Sputum . (08.09.24 @ 14:00)    Gram Stain:   Moderate polymorphonuclear leukocytes per low power field  No Squamous epithelial cells per low power field  Rare Yeast like cells per oil power field  Rare Gram positive cocci in pairs per oil power field  Rare Gram Positive Rods per oil power field   Specimen Source: ET Tube ET Tube   Culture Results:   Rare Pseudomonas aeruginosa  Normal Respiratory Aurelia present          RADIOLOGY:  [ ] Reviewed and interpreted by me    EEG Classification / Summary:  Abnormal  EEG in the awake  1. Voltage suppression, diffuse, severe    Clinical Impression:  1. Severe diffuse cerebral dysfunction that may be the result of either diffuse anoxic brain injury or sedative medication effects.  2. There were no epileptiform abnormalities recorded.

## 2024-08-11 NOTE — EEG REPORT - NS EEG TEXT BOX
EEG Report [Charted Location: [facility] CCU 3] [Authored: 10-Aug-2024 16:47]- for Visit: [id number], Complete, Revised, Signed in Full, Available to Patient    TECH INFORMATION:   Patient Status: Comatose.     This is a Continuous Video EEG.     Recording Technique: The patient underwent continuous video-EEG monitoring, using Telemetry System hardware on the [employer].  EEG and video data were stored on a computer hard drive with important events saved in digital archive files. The material was reviewed by a physician (electroencephalographer/epileptologist) on a daily basis.  Jignesh and seizure detection algorithms were utilized and reviewed.  An EEG Technician attended to the patient for 8 to 10 hours per day. The patient was observed by the epilepsy nursing staff 24 hours per day. The epilepsy center neurologist was available in person or on call 24 hours per day..     Placement and Labeling of Electrodes: The EEG was performed utilizing at least 20 channel referential EEG connections (coronal over temporal over parasagittal montage) with inferior temporal electrodes when indicting and using all standard 10-20 electrode placements with EKG, with additional electrodes placed in the inferior temporal region using the modified 10-10 montage electrode placements for elective admissions, or if deemed necessary.  Recording was at  a sampling rate of 256 samples per second per channel. Time synchronized digital video recording was done simultaneously with EEG recording.  A low light infrared camera was used for low light recording..    EEG REPORT:     SHAIK DONOHUE MRN-[id number]     Study Date: 		08-10-24, 08:00 to 08-11-24, 08:24  Duration x Hours: 24 hours 24 minutes  --------------------------------------------------------------------------------------------------  History:  HPI:  89 years old male with h/o HTN, HLD, DM, HFrEF, CAD s/p CABG s/p stents ( last stent in 05/2022), s/p PPM, CKD, PVD, h/o CVA x 3, s/p PEG, acute cholecystitis s/p cholecystostomy ( not surgical candidate, s/p cholecystomy tube removal ? 8/1) was brought in to ED with decreased responsiveness. Patient is bedbound, minimally verbal, A&OX1 baseline. Patient was noted to be unresponsive last night.   Had episode of low BP, sat well at RA, afebrile. No leukocytosis, plt 283, lactate 3.7---> 2.8, Cr 2.65 ( 1.58 in 07/2024), CT chest/abd/pelvis with no acute pathology. Stable bilateral pleural effusions and comrpessive atelectasis. CT head with no acute pathology. No significant change compared with 3/16/2024. (07 Aug 2024 11:32)      MEDICATIONS  (STANDING):  aspirin  chewable 81 milliGRAM(s) Oral daily  doxazosin 2 milliGRAM(s) Oral at bedtime  enoxaparin Injectable 30 milliGRAM(s) SubCutaneous every 24 hours  escitalopram 10 milliGRAM(s) Oral daily  levETIRAcetam  IVPB 500 milliGRAM(s) IV Intermittent every 12 hours  norepinephrine Infusion 0.05 MICROgram(s)/kG/Min (6.78 mL/Hr) IV Continuous <Continuous>  pantoprazole   Suspension 40 milliGRAM(s) Oral daily  piperacillin/tazobactam IVPB.. 3.375 Gram(s) IV Intermittent every 8 hours  ticagrelor 60 milliGRAM(s) Enteral Tube two times a day  valproate sodium   IVPB 500 milliGRAM(s) IV Intermittent every 12 hours    --------------------------------------------------------------------------------------------------  Study Interpretation:    [[[Abbreviation Key:  PDR=alpha rhythm/posterior dominant rhythm. A-P=anterior posterior gradient.  Amplitude: ‘very low’:<20; ‘low’:20-50; ‘medium’:; ‘high’:>200uV.  Persistence for periodic/rhythmic patterns (% of epoch) ‘rare’:<1%; ‘occasional’:1-10%; ‘frequent’:10-50%; ‘abundant’:50-90%; ‘continuous’:>90%.  Persistence for sporadic discharges: ‘rare’:<1/hr; ‘occasional’:1/min-1/hr; ‘frequent’:>1/min; ‘abundant’:>1/10 sec.  GRDA=generalized rhythmic delta activity; FIRDA=frontal intermittent GRDA; LRDA=lateralized rhythmic delta activity; TIRDA=temporal intermittent rhythmic delta activity;  LPD=PLED=lateralized periodic discharges; GPD=generalized periodic discharges; BiPDs=BiPLEDs=bilateral independent periodic epileptiform discharges; SIRPID=stimulus induced rhythmic, periodic, or ictal appearing discharges; BIRDs=brief potentially ictal rhythmic discharges >4 Hz, lasting .5-10s; PFA=paroxysmal bursts of beta/gamma; LVFA=low voltage fast activity.  Modifiers: +F=with fast component; +S=with spike component; +R=with rhythmic component.  S-B=burst suppression pattern.  Max=maximal. N1-drowsy; N2-stage II sleep; N3-slow wave sleep. SSS/BETS=small sharp spikes/benign epileptiform transients of sleep. HV=hyperventilation; PS=photic stimulation]]]    Daily EEG Visual Analysis    FINDINGS:      Background:  Continuity: suppressed  Symmetry: symmetric  PDR: not discernable  Reactivity: absent  Voltage: suppressed, less than 10  Anterior Posterior Gradient: absent  Other background findings:   Breach: absent    Background Slowing:  Generalized slowing: present. as mentioned above  Focal slowing: none was present.    State Changes:   -Absent    Sporadic Epileptiform Discharges:    None    Rhythmic and Periodic Patterns (RPPs):  None     Electrographic and Electroclinical seizures:  None    Other Clinical Events:  None    Activation Procedures:   -Hyperventilation was not performed.    -Photic stimulation was not performed.    Artifacts:  Intermittent myogenic and movement artifacts were noted, at times correlating with clinical extremity jerking.     ECG:  The heart rate on single channel ECG was predominantly between 70-80 BPM.    EEG Classification / Summary:  Abnormal  EEG in the awake  1. Voltage suppression, diffuse, severe    Clinical Impression:  1. Severe diffuse cerebral dysfunction that may be the result of either diffuse anoxic brain injury or sedative medication effects.  2. There were no epileptiform abnormalities recorded.      FRANCHESCA Godinez  Attending Physician, [facility] [place]    ------------------------------------  EEG Reading Room: [phone]  On Call Service After Hours: [phone]                      EEG Report [Charted Location: [facility] CCU 3] [Authored: 10-Aug-2024 16:47]- for Visit: [id number], Complete, Revised, Signed in Full, Available to Patient    TECH INFORMATION:   Patient Status: Comatose.     This is a Continuous Video EEG.     Recording Technique: The patient underwent continuous video-EEG monitoring, using Telemetry System hardware on the [employer].  EEG and video data were stored on a computer hard drive with important events saved in digital archive files. The material was reviewed by a physician (electroencephalographer/epileptologist) on a daily basis.  Jignesh and seizure detection algorithms were utilized and reviewed.  An EEG Technician attended to the patient for 8 to 10 hours per day. The patient was observed by the epilepsy nursing staff 24 hours per day. The epilepsy center neurologist was available in person or on call 24 hours per day..     Placement and Labeling of Electrodes: The EEG was performed utilizing at least 20 channel referential EEG connections (coronal over temporal over parasagittal montage) with inferior temporal electrodes when indicting and using all standard 10-20 electrode placements with EKG, with additional electrodes placed in the inferior temporal region using the modified 10-10 montage electrode placements for elective admissions, or if deemed necessary.  Recording was at  a sampling rate of 256 samples per second per channel. Time synchronized digital video recording was done simultaneously with EEG recording.  A low light infrared camera was used for low light recording..    EEG REPORT:     SHAIK DONOHUE MRN-[id number]     Study Date: 		08-10-24, 08:00 to 08-11-24, 08:24  Duration x Hours: 24 hours 24 minutes  --------------------------------------------------------------------------------------------------  History:  HPI:  89 years old male with h/o HTN, HLD, DM, HFrEF, CAD s/p CABG s/p stents ( last stent in 05/2022), s/p PPM, CKD, PVD, h/o CVA x 3, s/p PEG, acute cholecystitis s/p cholecystostomy ( not surgical candidate, s/p cholecystomy tube removal ? 8/1) was brought in to ED with decreased responsiveness. Patient is bedbound, minimally verbal, A&OX1 baseline. Patient was noted to be unresponsive last night.   Had episode of low BP, sat well at RA, afebrile. No leukocytosis, plt 283, lactate 3.7---> 2.8, Cr 2.65 ( 1.58 in 07/2024), CT chest/abd/pelvis with no acute pathology. Stable bilateral pleural effusions and comrpessive atelectasis. CT head with no acute pathology. No significant change compared with 3/16/2024. (07 Aug 2024 11:32)      MEDICATIONS  (STANDING):  aspirin  chewable 81 milliGRAM(s) Oral daily  doxazosin 2 milliGRAM(s) Oral at bedtime  enoxaparin Injectable 30 milliGRAM(s) SubCutaneous every 24 hours  escitalopram 10 milliGRAM(s) Oral daily  levETIRAcetam  IVPB 500 milliGRAM(s) IV Intermittent every 12 hours  norepinephrine Infusion 0.05 MICROgram(s)/kG/Min (6.78 mL/Hr) IV Continuous <Continuous>  pantoprazole   Suspension 40 milliGRAM(s) Oral daily  piperacillin/tazobactam IVPB.. 3.375 Gram(s) IV Intermittent every 8 hours  ticagrelor 60 milliGRAM(s) Enteral Tube two times a day  valproate sodium   IVPB 500 milliGRAM(s) IV Intermittent every 12 hours    --------------------------------------------------------------------------------------------------  Study Interpretation:    [[[Abbreviation Key:  PDR=alpha rhythm/posterior dominant rhythm. A-P=anterior posterior gradient.  Amplitude: ‘very low’:<20; ‘low’:20-50; ‘medium’:; ‘high’:>200uV.  Persistence for periodic/rhythmic patterns (% of epoch) ‘rare’:<1%; ‘occasional’:1-10%; ‘frequent’:10-50%; ‘abundant’:50-90%; ‘continuous’:>90%.  Persistence for sporadic discharges: ‘rare’:<1/hr; ‘occasional’:1/min-1/hr; ‘frequent’:>1/min; ‘abundant’:>1/10 sec.  GRDA=generalized rhythmic delta activity; FIRDA=frontal intermittent GRDA; LRDA=lateralized rhythmic delta activity; TIRDA=temporal intermittent rhythmic delta activity;  LPD=PLED=lateralized periodic discharges; GPD=generalized periodic discharges; BiPDs=BiPLEDs=bilateral independent periodic epileptiform discharges; SIRPID=stimulus induced rhythmic, periodic, or ictal appearing discharges; BIRDs=brief potentially ictal rhythmic discharges >4 Hz, lasting .5-10s; PFA=paroxysmal bursts of beta/gamma; LVFA=low voltage fast activity.  Modifiers: +F=with fast component; +S=with spike component; +R=with rhythmic component.  S-B=burst suppression pattern.  Max=maximal. N1-drowsy; N2-stage II sleep; N3-slow wave sleep. SSS/BETS=small sharp spikes/benign epileptiform transients of sleep. HV=hyperventilation; PS=photic stimulation]]]    Daily EEG Visual Analysis    FINDINGS:      Background:  Continuity: suppressed  Symmetry: symmetric  PDR: not discernable  Reactivity: absent  Voltage: suppressed, less than 10  Anterior Posterior Gradient: absent  Other background findings:   Breach: absent    Background Slowing:  Generalized slowing: present. as mentioned above  Focal slowing: none was present.    State Changes:   -Absent    Sporadic Epileptiform Discharges:    None    Rhythmic and Periodic Patterns (RPPs):  None     Electrographic and Electroclinical seizures:  None    Other Clinical Events:  None    Activation Procedures:   -Hyperventilation was not performed.    -Photic stimulation was not performed.    Artifacts:  Intermittent myogenic and movement artifacts were noted, at times correlating with clinical extremity jerking.     ECG:  The heart rate on single channel ECG was predominantly between 70-80 BPM.    EEG Classification / Summary:  Abnormal  EEG in the awake  1. Voltage suppression, diffuse, severe    Clinical Impression:  1. Severe diffuse cerebral dysfunction that may be the result of either diffuse anoxic brain injury or sedative medication effects.  2. There were no epileptiform abnormalities recorded.      FRANCHESCA Godinez  Attending Physician, [facility]    Rafy Kulkarni MD PhD  Director, Epilepsy Division, [facility]   ------------------------------------  EEG Reading Room: [phone]  On Call Service After Hours: [phone]

## 2024-08-12 NOTE — PROGRESS NOTE ADULT - PROBLEM SELECTOR PLAN 1
s/p PEA arrest 8/8 ROSC achieved in 13 minutes   Off sedation > 48 hours.  Pt Intubated, off sedation x 24 hrs . +corneal reflex bilaterally. poor  gag reflex  Minimal withdrawal to pain in both arms.     CT head (8/9/24)- no acute process  48 hour VEEG- severe diffuse cerebral dysfunction that may be the result of either diffuse anoxic brain injury or sedative medication effects.    Neuro following closely  Poor prognosis for meaningful recovery
brought in with decreased/unresponsiveness  CT head  ( I personally review) with no acute pathology. No significant change compared with 3/16/2024  UA dirty. Cr 2.65 ( 1.58 in 07/2024)  Likely metabolic encephalopathy due to MABLE on CKD, UTI  Ceftriaxone for UTI  Gentle IV hydration for MABLE  Palliative care consulted for GOC discussion

## 2024-08-12 NOTE — PROGRESS NOTE ADULT - PROBLEM SELECTOR PLAN 2
in the context of chronic illness  severe muscle mass wasting with severe subcutaneous fat store depletion  dependant for enteral feeds  continue orogastric feedings with  Glucerna 1.5
Cr 2.65 ( 1.58 in 07/2024), significantly elevated BUN, appear dry clinically  Likely pre-renal  Resume tube feed  Gentle IV hydration for 1 days. Closely monitor for volume overload due to HFrEF  Hold diuretics, avoid nephrotoxic substances  closely monitor renal function  Nutrition consult

## 2024-08-12 NOTE — PROGRESS NOTE ADULT - SUBJECTIVE AND OBJECTIVE BOX
Neurology Progress Note    No acute events overnight. Off sedation > 48 hours.    Neuro Exam: Intubated and not sedated. Eyes slightly open and widens with painful stimuli. No eye contact. PERRL and sluggish at 3mm. No facial asymmetry. No grimace to pain. +corneal reflex bilaterally. Minimal withdrawal to pain in both arms. Triple flexion response in legs. Increased tone throughout.      CT head (8/9/24)- no acute process. Personally reviewed  NSE- pending  48 hour VEEG- severe diffuse cerebral dysfunction that may be the result of either diffuse anoxic brain injury or sedative medication effects.    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  - discussed with son, Dr. Shaik Beltran, over the phone.

## 2024-08-12 NOTE — PROGRESS NOTE ADULT - PROBLEM SELECTOR PLAN 3
presently intubated s/P PEA arrest.   decline in functional and cognitive status over past 9 months, now with no mental status   requiring more assist with ADL's since turn of the year   PGT feedings in place   incontinent of bowel and bladder   PPSV2: 20%.
UA appear dirty. Unable to get history from patient  Continue ceftriaxone for a total of 3 days  Check urine culture

## 2024-08-12 NOTE — PROGRESS NOTE ADULT - SUBJECTIVE AND OBJECTIVE BOX
HPI:  89 years old male with h/o HTN, HLD, DM, HFrEF, CAD s/p CABG s/p stents ( last stent in 05/2022), s/p PPM, CKD, PVD, h/o CVA x 3, s/p PEG, acute cholecystitis s/p cholecystostomy ( not surgical candidate, s/p cholecystomy tube removal ? 8/1) was brought in to ED with decreased responsiveness. Patient is bedbound, minimally verbal, A&OX1 baseline. Patient was noted to be unresponsive last night.   Had episode of low BP, sat well at RA, afebrile. No leukocytosis, plt 283, lactate 3.7---> 2.8, Cr 2.65 ( 1.58 in 07/2024), CT chest/abd/pelvis with no acute pathology. Stable bilateral pleural effusions and comrpessive atelectasis. CT head with no acute pathology. No significant change compared with 3/16/2024. (07 Aug 2024 11:32)      24 hr events: No acute events, remains intubated on pressors.     ## ROS:  [x ] unable to obtain    ## Vitals  ICU Vital Signs Last 24 Hrs  T(C): 38.3 (12 Aug 2024 07:00), Max: 38.5 (12 Aug 2024 06:00)  T(F): 100.9 (12 Aug 2024 07:00), Max: 101.3 (12 Aug 2024 06:00)  HR: 86 (12 Aug 2024 07:00) (77 - 91)  BP: 126/50 (12 Aug 2024 07:00) (112/62 - 146/56)  BP(mean): 73 (12 Aug 2024 07:00) (64 - 86)  ABP: --  ABP(mean): --  RR: 19 (12 Aug 2024 07:00) (14 - 30)  SpO2: 100% (12 Aug 2024 07:00) (98% - 100%)    O2 Parameters below as of 12 Aug 2024 07:00  Patient On (Oxygen Delivery Method): ventilator            ## Physical Exam:  Gen: Elderly male lying in bed, intubated, sedated, NAD  HEENT: NC/AT sclerae anicteric. ET tube in place  Resp: Mechanical breath sounds b/l, overbreathes vent  CV: S1, S2  Abd: Soft, + BS  Ext: WWP  Neuro: Unresponsive. + GAG, sluggish pupils    ## Vent Data  Mode: AC/ CMV (Assist Control/ Continuous Mandatory Ventilation)  RR (machine): 15  TV (machine): 450  FiO2: 30  PEEP: 5  ITime: 1  MAP: 15  PIP: 25      ## Labs:  Chem:  08-12    147<H>  |  116<H>  |  44<H>  ----------------------------<  108<H>  3.2<L>   |  25  |  1.65<H>    Ca    8.3<L>      12 Aug 2024 03:00  Phos  2.4     08-12  Mg     2.6     08-12    TPro  6.2  /  Alb  1.3<L>  /  TBili  0.6  /  DBili  x   /  AST  26  /  ALT  15  /  AlkPhos  50  08-12    LIVER FUNCTIONS - ( 12 Aug 2024 03:00 )  Alb: 1.3 g/dL / Pro: 6.2 gm/dL / ALK PHOS: 50 U/L / ALT: 15 U/L / AST: 26 U/L / GGT: x           CBC:                        7.9    13.09 )-----------( 182      ( 12 Aug 2024 03:00 )             24.9     Coags:      Urinalysis Basic - ( 12 Aug 2024 03:00 )    Color: x / Appearance: x / SG: x / pH: x  Gluc: 108 mg/dL / Ketone: x  / Bili: x / Urobili: x   Blood: x / Protein: x / Nitrite: x   Leuk Esterase: x / RBC: x / WBC x   Sq Epi: x / Non Sq Epi: x / Bacteria: x        ## Cardiac        ## Blood Gas      #I/Os  I&O's Detail    11 Aug 2024 07:01  -  12 Aug 2024 07:00  --------------------------------------------------------  IN:    Free Water: 500 mL    Glucerna 1.5: 350 mL    IV PiggyBack: 100 mL    Norepinephrine: 335.3 mL  Total IN: 1285.3 mL    OUT:    Blood Loss (mL): 2 mL    Incontinent per Condom Catheter (mL): 1000 mL    Oral Fluid: 0 mL  Total OUT: 1002 mL    Total NET: 283.3 mL          ## Imaging:    ## Medications:  MEDICATIONS  (STANDING):  albuterol/ipratropium for Nebulization 3 milliLiter(s) Nebulizer every 6 hours  artificial  tears Solution 1 Drop(s) Both EYES four times a day  aspirin  chewable 81 milliGRAM(s) Oral daily  chlorhexidine 0.12% Liquid 15 milliLiter(s) Oral Mucosa every 12 hours  chlorhexidine 2% Cloths 1 Application(s) Topical <User Schedule>  dextrose 5%. 1000 milliLiter(s) (50 mL/Hr) IV Continuous <Continuous>  doxazosin 2 milliGRAM(s) Oral at bedtime  enoxaparin Injectable 30 milliGRAM(s) SubCutaneous every 24 hours  escitalopram 10 milliGRAM(s) Oral daily  insulin lispro (ADMELOG) corrective regimen sliding scale   SubCutaneous every 6 hours  insulin NPH human recombinant 8 Unit(s) SubCutaneous every 6 hours  levETIRAcetam  Solution 500 milliGRAM(s) Oral two times a day  midodrine 10 milliGRAM(s) Oral every 8 hours  norepinephrine Infusion 0.05 MICROgram(s)/kG/Min (6.78 mL/Hr) IV Continuous <Continuous>  pantoprazole   Suspension 40 milliGRAM(s) Oral daily  piperacillin/tazobactam IVPB.. 3.375 Gram(s) IV Intermittent every 8 hours  polyethylene glycol 3350 17 Gram(s) Oral daily  senna 2 Tablet(s) Oral at bedtime  ticagrelor 60 milliGRAM(s) Enteral Tube two times a day  valproic  acid Syrup 500 milliGRAM(s) Oral two times a day    MEDICATIONS  (PRN):  acetaminophen   Oral Liquid .. 650 milliGRAM(s) Oral every 6 hours PRN Temp greater or equal to 38C (100.4F), Mild Pain (1 - 3), Moderate Pain (4 - 6)  sodium chloride 0.9% lock flush 10 milliLiter(s) IV Push every 1 hour PRN Pre/post blood products, medications, blood draw, and to maintain line patency       HPI:  89 years old male with h/o HTN, HLD, DM, HFrEF, CAD s/p CABG s/p stents ( last stent in 05/2022), s/p PPM, CKD, PVD, h/o CVA x 3, s/p PEG, acute cholecystitis s/p cholecystostomy ( not surgical candidate, s/p cholecystomy tube removal ? 8/1) was brought in to ED with decreased responsiveness. Patient is bedbound, minimally verbal, A&OX1 baseline. Patient was noted to be unresponsive last night.   Had episode of low BP, sat well at RA, afebrile. No leukocytosis, plt 283, lactate 3.7---> 2.8, Cr 2.65 ( 1.58 in 07/2024), CT chest/abd/pelvis with no acute pathology. Stable bilateral pleural effusions and comrpessive atelectasis. CT head with no acute pathology. No significant change compared with 3/16/2024. (07 Aug 2024 11:32)      24 hr events: No acute events, remains intubated on pressors.     ## ROS:  [x ] unable to obtain    ## Vitals  ICU Vital Signs Last 24 Hrs  T(C): 38.3 (12 Aug 2024 07:00), Max: 38.5 (12 Aug 2024 06:00)  T(F): 100.9 (12 Aug 2024 07:00), Max: 101.3 (12 Aug 2024 06:00)  HR: 86 (12 Aug 2024 07:00) (77 - 91)  BP: 126/50 (12 Aug 2024 07:00) (112/62 - 146/56)  BP(mean): 73 (12 Aug 2024 07:00) (64 - 86)  ABP: --  ABP(mean): --  RR: 19 (12 Aug 2024 07:00) (14 - 30)  SpO2: 100% (12 Aug 2024 07:00) (98% - 100%)    O2 Parameters below as of 12 Aug 2024 07:00  Patient On (Oxygen Delivery Method): ventilator            ## Physical Exam:  Gen: Elderly male lying in bed, intubated, sedated, NAD  HEENT: NC/AT sclerae anicteric. ET tube in place  Resp: Mechanical breath sounds b/l, overbreathes vent  CV: S1, S2  Abd: Soft, + BS  Ext: WWP  Neuro: Unresponsive. + GAG, pupils constricted and sluggish, + corneals     ## Vent Data  Mode: AC/ CMV (Assist Control/ Continuous Mandatory Ventilation)  RR (machine): 15  TV (machine): 450  FiO2: 30  PEEP: 5  ITime: 1  MAP: 15  PIP: 25      ## Labs:  Chem:  08-12    147<H>  |  116<H>  |  44<H>  ----------------------------<  108<H>  3.2<L>   |  25  |  1.65<H>    Ca    8.3<L>      12 Aug 2024 03:00  Phos  2.4     08-12  Mg     2.6     08-12    TPro  6.2  /  Alb  1.3<L>  /  TBili  0.6  /  DBili  x   /  AST  26  /  ALT  15  /  AlkPhos  50  08-12    LIVER FUNCTIONS - ( 12 Aug 2024 03:00 )  Alb: 1.3 g/dL / Pro: 6.2 gm/dL / ALK PHOS: 50 U/L / ALT: 15 U/L / AST: 26 U/L / GGT: x           CBC:                        7.9    13.09 )-----------( 182      ( 12 Aug 2024 03:00 )             24.9     Coags:      Urinalysis Basic - ( 12 Aug 2024 03:00 )    Color: x / Appearance: x / SG: x / pH: x  Gluc: 108 mg/dL / Ketone: x  / Bili: x / Urobili: x   Blood: x / Protein: x / Nitrite: x   Leuk Esterase: x / RBC: x / WBC x   Sq Epi: x / Non Sq Epi: x / Bacteria: x        ## Cardiac        ## Blood Gas      #I/Os  I&O's Detail    11 Aug 2024 07:01  -  12 Aug 2024 07:00  --------------------------------------------------------  IN:    Free Water: 500 mL    Glucerna 1.5: 350 mL    IV PiggyBack: 100 mL    Norepinephrine: 335.3 mL  Total IN: 1285.3 mL    OUT:    Blood Loss (mL): 2 mL    Incontinent per Condom Catheter (mL): 1000 mL    Oral Fluid: 0 mL  Total OUT: 1002 mL    Total NET: 283.3 mL          ## Imaging:    ## Medications:  MEDICATIONS  (STANDING):  albuterol/ipratropium for Nebulization 3 milliLiter(s) Nebulizer every 6 hours  artificial  tears Solution 1 Drop(s) Both EYES four times a day  aspirin  chewable 81 milliGRAM(s) Oral daily  chlorhexidine 0.12% Liquid 15 milliLiter(s) Oral Mucosa every 12 hours  chlorhexidine 2% Cloths 1 Application(s) Topical <User Schedule>  dextrose 5%. 1000 milliLiter(s) (50 mL/Hr) IV Continuous <Continuous>  doxazosin 2 milliGRAM(s) Oral at bedtime  enoxaparin Injectable 30 milliGRAM(s) SubCutaneous every 24 hours  escitalopram 10 milliGRAM(s) Oral daily  insulin lispro (ADMELOG) corrective regimen sliding scale   SubCutaneous every 6 hours  insulin NPH human recombinant 8 Unit(s) SubCutaneous every 6 hours  levETIRAcetam  Solution 500 milliGRAM(s) Oral two times a day  midodrine 10 milliGRAM(s) Oral every 8 hours  norepinephrine Infusion 0.05 MICROgram(s)/kG/Min (6.78 mL/Hr) IV Continuous <Continuous>  pantoprazole   Suspension 40 milliGRAM(s) Oral daily  piperacillin/tazobactam IVPB.. 3.375 Gram(s) IV Intermittent every 8 hours  polyethylene glycol 3350 17 Gram(s) Oral daily  senna 2 Tablet(s) Oral at bedtime  ticagrelor 60 milliGRAM(s) Enteral Tube two times a day  valproic  acid Syrup 500 milliGRAM(s) Oral two times a day    MEDICATIONS  (PRN):  acetaminophen   Oral Liquid .. 650 milliGRAM(s) Oral every 6 hours PRN Temp greater or equal to 38C (100.4F), Mild Pain (1 - 3), Moderate Pain (4 - 6)  sodium chloride 0.9% lock flush 10 milliLiter(s) IV Push every 1 hour PRN Pre/post blood products, medications, blood draw, and to maintain line patency

## 2024-08-12 NOTE — PROGRESS NOTE ADULT - ASSESSMENT
HPI:  89 years old male with h/o HTN, HLD, DM, HFrEF, CAD s/p CABG s/p stents ( last stent in 05/2022), s/p PPM, CKD, PVD, h/o CVA x 3, s/p PEG, acute cholecystitis s/p cholecystostomy ( not surgical candidate, s/p cholecystomy tube removal  was brought in to ED with decreased responsiveness. Patient is bedbound, minimally verbal, A&OX1 baseline. Patient was noted to be unresponsive night prior to hospital admission

## 2024-08-12 NOTE — PROGRESS NOTE ADULT - ASSESSMENT
89 y/o M w/CAD, HFrEF, DM, CKD, prior CVAs, s/p PEG tube for dysphagia, admitted for sepsis secondary to UTI and MABLE on CKD likely ATN w/hospital course c/b cardiac arrest, prolonged downtime, likely severe anoxic brain injury. Acute respiratory failure with hypoxia and hypercapnia in setting of arrest. Hypotension likely severe sepsis with septic shock secondary to possible aspiration PNA vs post-arrest.     - Hold sedation, EEG negative for seizures, poor prognosis for neurologic recovery  - Continue mechanical ventilation, will likely need trach if consistent w/GOC  - Titrate pressors as needed goal MAP >= 65  - Trend Cr, avoid nephrotoxins  - Broad spectrum abx  - D5 gtt for hypernatremia  - DVT prophylaxis  - Full code    I have personally provided 35 minutes of attending critical care time excluding procedures. 89 y/o M w/CAD, HFrEF, DM, CKD, prior CVAs, s/p PEG tube for dysphagia, admitted for sepsis secondary to UTI and MABLE on CKD likely ATN w/hospital course c/b cardiac arrest, prolonged downtime, likely severe anoxic brain injury. Acute respiratory failure with hypoxia and hypercapnia in setting of arrest. Hypotension likely severe sepsis with septic shock secondary to pseudomonal PNA.    - Hold sedation, EEG negative for seizures, poor prognosis for neurologic recovery  - Continue mechanical ventilation, will likely need trach if consistent w/GOC  - Titrate pressors as needed goal MAP >= 65  - Trend Cr, avoid nephrotoxins  - Broad spectrum abx  - D5 gtt for hypernatremia  - DVT prophylaxis  - Full code    I have personally provided 35 minutes of attending critical care time excluding procedures.

## 2024-08-12 NOTE — PROGRESS NOTE ADULT - SUBJECTIVE AND OBJECTIVE BOX
follow up on:  complex medical decision making related to goals of care    OVERNIGHT EVENTS:  No acute events overnight. Off sedation > 48 hours.    Review of systems:     Pain:  [ ] yes [x ] no  QOL impact -   Location -                    Aggravating factors -  Quality -  Radiation -  Timing-  Severity (0-10 scale):  Minimal acceptable level (0-10 scale):     Unable to obtain/Limited due to poor mental status/intubation     MEDICATIONS  (STANDING):  albuterol/ipratropium for Nebulization 3 milliLiter(s) Nebulizer every 6 hours  artificial  tears Solution 1 Drop(s) Both EYES four times a day  aspirin  chewable 81 milliGRAM(s) Oral daily  chlorhexidine 0.12% Liquid 15 milliLiter(s) Oral Mucosa every 12 hours  chlorhexidine 2% Cloths 1 Application(s) Topical <User Schedule>  dextrose 5%. 1000 milliLiter(s) (50 mL/Hr) IV Continuous <Continuous>  doxazosin 2 milliGRAM(s) Oral at bedtime  enoxaparin Injectable 30 milliGRAM(s) SubCutaneous every 24 hours  escitalopram 10 milliGRAM(s) Oral daily  insulin lispro (ADMELOG) corrective regimen sliding scale   SubCutaneous every 6 hours  insulin NPH human recombinant 8 Unit(s) SubCutaneous every 6 hours  levETIRAcetam  Solution 500 milliGRAM(s) Oral two times a day  midodrine 10 milliGRAM(s) Oral every 8 hours  norepinephrine Infusion 0.05 MICROgram(s)/kG/Min (6.78 mL/Hr) IV Continuous <Continuous>  pantoprazole   Suspension 40 milliGRAM(s) Oral daily  piperacillin/tazobactam IVPB.. 3.375 Gram(s) IV Intermittent every 8 hours  polyethylene glycol 3350 17 Gram(s) Oral daily  senna 2 Tablet(s) Oral at bedtime  ticagrelor 60 milliGRAM(s) Enteral Tube two times a day  valproic  acid Syrup 500 milliGRAM(s) Oral two times a day    MEDICATIONS  (PRN):  acetaminophen   Oral Liquid .. 650 milliGRAM(s) Oral every 6 hours PRN Temp greater or equal to 38C (100.4F), Mild Pain (1 - 3), Moderate Pain (4 - 6)  sodium chloride 0.9% lock flush 10 milliLiter(s) IV Push every 1 hour PRN Pre/post blood products, medications, blood draw, and to maintain line patency    PHYSICAL EXAM:  Vital Signs Last 24 Hrs  T(C): 38.5 (12 Aug 2024 18:00), Max: 38.5 (12 Aug 2024 06:00)  T(F): 101.3 (12 Aug 2024 18:00), Max: 101.3 (12 Aug 2024 06:00)  HR: 92 (12 Aug 2024 18:00) (79 - 92)  BP: 122/48 (12 Aug 2024 18:00) (112/62 - 146/91)  BP(mean): 69 (12 Aug 2024 18:00) (64 - 104)  RR: 30 (12 Aug 2024 18:00) (15 - 31)  SpO2: 100% (12 Aug 2024 18:00) (98% - 100%)    Parameters below as of 12 Aug 2024 17:19  Patient On (Oxygen Delivery Method): ventilator      Palliative Performance Scale/Karnofsky Score: 20%     GENERAL: elderly man in bed, intubated, on pressor support   HEENT: NC/AT sclerae anicteric. ET tube in place  LUNGS:: Mechanical breath sounds b/l, overbreathes vent  HEART;  S1, S2  Abd: Soft, softly distend, + BS  Neuro: Unresponsive. withraws to pain, + minimal  GAG, pupils constricted and sluggish 3 mm,    HEENT: Atraumatic, oropharynx clear, neck supple    oral intake: brittani gastric feeds    LABS:                          7.9    13.09 )-----------( 182      ( 12 Aug 2024 03:00 )             24.9     08-12    147<H>  |  116<H>  |  44<H>  ----------------------------<  108<H>  3.2<L>   |  25  |  1.65<H>    Ca    8.3<L>      12 Aug 2024 03:00  Phos  2.4     08-12  Mg     2.6     08-12    TPro  6.2  /  Alb  1.3<L>  /  TBili  0.6  /  DBili  x   /  AST  26  /  ALT  15  /  AlkPhos  50  08-12    Urinalysis Basic - ( 12 Aug 2024 03:00 )    Color: x / Appearance: x / SG: x / pH: x  Gluc: 108 mg/dL / Ketone: x  / Bili: x / Urobili: x   Blood: x / Protein: x / Nitrite: x   Leuk Esterase: x / RBC: x / WBC x   Sq Epi: x / Non Sq Epi: x / Bacteria: x        RADIOLOGY & ADDITIONAL STUDIES: reviewed

## 2024-08-12 NOTE — PROGRESS NOTE ADULT - PROBLEM SELECTOR PLAN 4
multiple grandchildren at bedside throughout the day.  State that family is hoping for a miracle  HCP son not expected to arrive at hospital to much later  T/C call with  requesting call back to discuss events of past days and GOC moving forward  pending callback
Likely due to MABLE on CKD  serial trop/CK/CKMB, telemetry monitoring  on DAPT  Not a candidate for invasive intervention

## 2024-08-13 NOTE — ACUTE INTERFACILITY TRANSFER NOTE - NS MD INTERFACILITY TRANSFER LOCATION
BS 98mgdl   IN TRIAGE airway,breathing and circulation intact, without need for intervention . Alert and interacting appropriately for age and situation.  Pt had seizures for 10min called 911 given 2 im shots of total versed per ambulance of total 3mg   Patient's blood sugar prior to departure per EMS is 98   Pt snoring at this time and spo2 100% high flow o2.   Pt stopped sz act after ativan. Pt snoring resp and RT in room maintaining airway.   Report to EMS.   no KATHIA CHANEY

## 2024-08-13 NOTE — CHART NOTE - NSCHARTNOTEFT_GEN_A_CORE
Family request MRI of brain for further prognosticating. Per chart review, patient received an MRI of the brain in February 2024 in our system. Pacemaker information is as follows:     : [employer]  Model: [place] XT DR MRI [id number]  Mode: AAI -> DDD  Rate:  bpm    Called our MRI and explained situation, they stated that our facility is not capable of providing MRI with pacemakers as we do not have the proper software. They MR if needed would have to be at a different facility.  Will discuss with attending MD and family.

## 2024-08-13 NOTE — PROGRESS NOTE ADULT - SUBJECTIVE AND OBJECTIVE BOX
HPI:  89 years old male with h/o HTN, HLD, DM, HFrEF, CAD s/p CABG s/p stents ( last stent in 05/2022), s/p PPM, CKD, PVD, h/o CVA x 3, s/p PEG, acute cholecystitis s/p cholecystostomy ( not surgical candidate, s/p cholecystomy tube removal ? 8/1) was brought in to ED with decreased responsiveness. Patient is bedbound, minimally verbal, A&OX1 baseline. Patient was noted to be unresponsive last night.   Had episode of low BP, sat well at RA, afebrile. No leukocytosis, plt 283, lactate 3.7---> 2.8, Cr 2.65 ( 1.58 in 07/2024), CT chest/abd/pelvis with no acute pathology. Stable bilateral pleural effusions and comrpessive atelectasis. CT head with no acute pathology. No significant change compared with 3/16/2024. (07 Aug 2024 11:32)      24 hr events: No acute events.     ## ROS:  [ x] unable to obtain    ## Vitals  ICU Vital Signs Last 24 Hrs  T(C): 38 (13 Aug 2024 07:00), Max: 38.9 (12 Aug 2024 18:42)  T(F): 100.4 (13 Aug 2024 07:00), Max: 102 (12 Aug 2024 18:42)  HR: 91 (13 Aug 2024 07:00) (81 - 94)  BP: 128/50 (13 Aug 2024 07:00) (106/43 - 146/91)  BP(mean): 72 (13 Aug 2024 07:00) (62 - 104)  ABP: --  ABP(mean): --  RR: 21 (13 Aug 2024 07:00) (7 - 34)  SpO2: 100% (13 Aug 2024 07:00) (97% - 100%)    O2 Parameters below as of 13 Aug 2024 07:00  Patient On (Oxygen Delivery Method): ventilator            ## Physical Exam:  Gen: Elderly male lying in bed, intubated, sedated, NAD  HEENT: NC/AT sclerae anicteric. ET tube in place  Resp: Mechanical breath sounds b/l, overbreathes vent  CV: S1, S2  Abd: Soft, + BS  Ext: WWP  Neuro: Unresponsive. + GAG, pupils constricted and sluggish, + corneals     ## Vent Data  Mode: CPAP with PS  FiO2: 30  PEEP: 5  PS: 10  ITime: 1  MAP: 16  PIP: 24      ## Labs:  Chem:  08-13    142  |  111<H>  |  47<H>  ----------------------------<  228<H>  3.5   |  24  |  1.73<H>    Ca    7.8<L>      13 Aug 2024 03:24  Phos  2.5     08-13  Mg     2.5     08-13    TPro  6.2  /  Alb  1.2<L>  /  TBili  0.3  /  DBili  x   /  AST  28  /  ALT  13  /  AlkPhos  84  08-13    LIVER FUNCTIONS - ( 13 Aug 2024 03:24 )  Alb: 1.2 g/dL / Pro: 6.2 gm/dL / ALK PHOS: 84 U/L / ALT: 13 U/L / AST: 28 U/L / GGT: x           CBC:                        7.1    10.65 )-----------( 163      ( 13 Aug 2024 03:24 )             22.9     Coags:      Urinalysis Basic - ( 13 Aug 2024 03:24 )    Color: x / Appearance: x / SG: x / pH: x  Gluc: 228 mg/dL / Ketone: x  / Bili: x / Urobili: x   Blood: x / Protein: x / Nitrite: x   Leuk Esterase: x / RBC: x / WBC x   Sq Epi: x / Non Sq Epi: x / Bacteria: x        ## Cardiac        ## Blood Gas      #I/Os  I&O's Detail    12 Aug 2024 07:01  -  13 Aug 2024 07:00  --------------------------------------------------------  IN:    dextrose 5%: 1150 mL    Free Water: 1200 mL    Glucerna 1.5: 940 mL    IV PiggyBack: 200 mL    Norepinephrine: 131.8 mL  Total IN: 3621.8 mL    OUT:    Incontinent per Condom Catheter (mL): 495 mL  Total OUT: 495 mL    Total NET: 3126.8 mL          ## Imaging:    ## Medications:  MEDICATIONS  (STANDING):  albuterol/ipratropium for Nebulization 3 milliLiter(s) Nebulizer every 6 hours  artificial  tears Solution 1 Drop(s) Both EYES four times a day  aspirin  chewable 81 milliGRAM(s) Oral daily  chlorhexidine 0.12% Liquid 15 milliLiter(s) Oral Mucosa every 12 hours  chlorhexidine 2% Cloths 1 Application(s) Topical <User Schedule>  dextrose 5%. 1000 milliLiter(s) (50 mL/Hr) IV Continuous <Continuous>  doxazosin 2 milliGRAM(s) Oral at bedtime  enoxaparin Injectable 30 milliGRAM(s) SubCutaneous every 24 hours  escitalopram 10 milliGRAM(s) Oral daily  fentaNYL    Injectable 50 MICROGram(s) IV Push once  insulin lispro (ADMELOG) corrective regimen sliding scale   SubCutaneous every 6 hours  insulin NPH human recombinant 8 Unit(s) SubCutaneous every 6 hours  levETIRAcetam  Solution 500 milliGRAM(s) Oral two times a day  midodrine 10 milliGRAM(s) Oral every 8 hours  norepinephrine Infusion 0.05 MICROgram(s)/kG/Min (6.78 mL/Hr) IV Continuous <Continuous>  pantoprazole   Suspension 40 milliGRAM(s) Oral daily  piperacillin/tazobactam IVPB.. 3.375 Gram(s) IV Intermittent every 8 hours  polyethylene glycol 3350 17 Gram(s) Oral daily  senna 2 Tablet(s) Oral at bedtime  ticagrelor 60 milliGRAM(s) Enteral Tube two times a day  valproic  acid Syrup 500 milliGRAM(s) Oral two times a day    MEDICATIONS  (PRN):  acetaminophen   Oral Liquid .. 650 milliGRAM(s) Oral every 6 hours PRN Temp greater or equal to 38C (100.4F), Mild Pain (1 - 3), Moderate Pain (4 - 6)  sodium chloride 0.9% lock flush 10 milliLiter(s) IV Push every 1 hour PRN Pre/post blood products, medications, blood draw, and to maintain line patency

## 2024-08-13 NOTE — PROGRESS NOTE ADULT - ASSESSMENT
89 y/o M w/CAD, HFrEF, DM, CKD, prior CVAs, s/p PEG tube for dysphagia, admitted for sepsis secondary to UTI and MABLE on CKD likely ATN w/hospital course c/b cardiac arrest, prolonged downtime, likely severe anoxic brain injury. Acute respiratory failure with hypoxia and hypercapnia in setting of arrest. Hypotension likely severe sepsis with septic shock secondary to pseudomonal PNA.    - Hold sedation, EEG negative for seizures, poor prognosis for neurologic recovery  - Continue mechanical ventilation, will likely need trach if consistent w/GOC  - Titrate pressors as needed goal MAP >= 65  - Trend Cr, avoid nephrotoxins  - Broad spectrum abx  - Hypernatremia resolved  - DVT prophylaxis  - Full code    I have personally provided 35 minutes of attending critical care time excluding procedures.   89 y/o M w/CAD, HFrEF, DM, CKD, prior CVAs, s/p PEG tube for dysphagia, admitted for sepsis secondary to UTI and MABLE on CKD likely ATN w/hospital course c/b cardiac arrest, prolonged downtime, likely severe anoxic brain injury. Acute respiratory failure with hypoxia and hypercapnia in setting of arrest. Hypotension likely severe sepsis with septic shock secondary to pseudomonal PNA.    - Hold sedation, EEG negative for seizures, poor prognosis for neurologic recovery  - MRI brain, will repeat EEG in a few days at family request  - Continue mechanical ventilation, will likely need trach if consistent w/GOC  - Titrate pressors as needed goal MAP >= 65  - Trend Cr, avoid nephrotoxins  - Broad spectrum abx  - Hypernatremia resolved  - DVT prophylaxis  - Full code    I have personally provided 35 minutes of attending critical care time excluding procedures.

## 2024-08-13 NOTE — ACUTE INTERFACILITY TRANSFER NOTE - HOSPITAL COURSE
89 y/o M w/CAD, HFrEF, DM, CKD, prior CVAs, s/p PEG tube for dysphagia, admitted for sepsis secondary to UTI and MABLE on CKD likely ATN w/hospital course c/b cardiac arrest, prolonged downtime, likely severe anoxic brain injury. Acute respiratory failure with hypoxia and hypercapnia in setting of arrest. Hypotension likely severe sepsis with septic shock secondary to pseudomonal PNA.    - Off all sedation, EEG negative for seizures, poor prognosis for neurologic recovery, CT.   - MRI brain, unable to be done at facility due to pacemaker (has had in past)  - Continue mechanical ventilation, will likely need trach if consistent w/GOC  - Titrate pressors as needed goal MAP >= 65  - Trend Cr, avoid nephrotoxins  - Broad spectrum abx  - DVT prophylaxis  - Full code    *Transfer to [facility] for MRI capabilities and further neuro work up

## 2024-08-14 NOTE — PROGRESS NOTE ADULT - SUBJECTIVE AND OBJECTIVE BOX
HPI:  89 years old male with h/o HTN, HLD, DM, HFrEF, CAD s/p CABG s/p stents ( last stent in 05/2022), s/p PPM, CKD, PVD, h/o CVA x 3, s/p PEG, acute cholecystitis s/p cholecystostomy ( not surgical candidate, s/p cholecystomy tube removal ? 8/1) was brought in to ED with decreased responsiveness. Patient is bedbound, minimally verbal, A&OX1 baseline. Patient was noted to be unresponsive last night.   Had episode of low BP, sat well at RA, afebrile. No leukocytosis, plt 283, lactate 3.7---> 2.8, Cr 2.65 ( 1.58 in 07/2024), CT chest/abd/pelvis with no acute pathology. Stable bilateral pleural effusions and comrpessive atelectasis. CT head with no acute pathology. No significant change compared with 3/16/2024. (07 Aug 2024 11:32)      24 hr events: No acute events.     ## ROS:  [x ] unable to obtain      ## Vitals  ICU Vital Signs Last 24 Hrs  T(C): 37.9 (14 Aug 2024 08:00), Max: 39.6 (13 Aug 2024 23:34)  T(F): 100.2 (14 Aug 2024 08:00), Max: 103.3 (13 Aug 2024 23:34)  HR: 98 (14 Aug 2024 08:00) (84 - 106)  BP: 100/50 (14 Aug 2024 08:00) (98/36 - 146/95)  BP(mean): 65 (14 Aug 2024 08:00) (52 - 110)  ABP: --  ABP(mean): --  RR: 21 (14 Aug 2024 08:00) (16 - 41)  SpO2: 100% (14 Aug 2024 08:00) (100% - 100%)    O2 Parameters below as of 14 Aug 2024 05:55  Patient On (Oxygen Delivery Method): ventilator            ## Physical Exam:  Gen: Elderly male lying in bed, intubated, sedated, NAD  HEENT: NC/AT sclerae anicteric. ET tube in place  Resp: Mechanical breath sounds b/l, overbreathes vent  CV: S1, S2  Abd: Soft, + BS  Ext: WWP  Neuro: Unresponsive. + GAG, pupils constricted and sluggish, + corneals     ## Vent Data  Mode: AC/ CMV (Assist Control/ Continuous Mandatory Ventilation)  RR (machine): 15  TV (machine): 450  FiO2: 25  PEEP: 5  ITime: 0.9  MAP: 9  PIP: 37      ## Labs:  Chem:  08-14    139  |  108  |  50<H>  ----------------------------<  157<H>  4.2   |  24  |  1.82<H>    Ca    8.0<L>      14 Aug 2024 02:35  Phos  4.0     08-14  Mg     2.6     08-14    TPro  7.0  /  Alb  1.4<L>  /  TBili  0.4  /  DBili  x   /  AST  39<H>  /  ALT  13  /  AlkPhos  79  08-14    LIVER FUNCTIONS - ( 14 Aug 2024 02:35 )  Alb: 1.4 g/dL / Pro: 7.0 gm/dL / ALK PHOS: 79 U/L / ALT: 13 U/L / AST: 39 U/L / GGT: x           CBC:                        7.8    11.84 )-----------( 170      ( 14 Aug 2024 02:35 )             25.0     Coags:      Urinalysis Basic - ( 14 Aug 2024 02:35 )    Color: x / Appearance: x / SG: x / pH: x  Gluc: 157 mg/dL / Ketone: x  / Bili: x / Urobili: x   Blood: x / Protein: x / Nitrite: x   Leuk Esterase: x / RBC: x / WBC x   Sq Epi: x / Non Sq Epi: x / Bacteria: x        ## Cardiac        ## Blood Gas      #I/Os  I&O's Detail    13 Aug 2024 07:01  -  14 Aug 2024 07:00  --------------------------------------------------------  IN:    dextrose 5%: 50 mL    Free Water: 1200 mL    Glucerna 1.5: 850 mL    IV PiggyBack: 300 mL    Modular Fluid: 125 mL    Norepinephrine: 2.7 mL  Total IN: 2527.7 mL    OUT:    Incontinent per Condom Catheter (mL): 670 mL  Total OUT: 670 mL    Total NET: 1857.7 mL          ## Imaging:    ## Medications:  MEDICATIONS  (STANDING):  albuterol/ipratropium for Nebulization 3 milliLiter(s) Nebulizer every 6 hours  artificial  tears Solution 1 Drop(s) Both EYES four times a day  aspirin  chewable 81 milliGRAM(s) Oral daily  chlorhexidine 0.12% Liquid 15 milliLiter(s) Oral Mucosa every 12 hours  chlorhexidine 2% Cloths 1 Application(s) Topical <User Schedule>  doxazosin 2 milliGRAM(s) Oral at bedtime  enoxaparin Injectable 30 milliGRAM(s) SubCutaneous every 24 hours  escitalopram 10 milliGRAM(s) Oral daily  insulin lispro (ADMELOG) corrective regimen sliding scale   SubCutaneous every 6 hours  insulin NPH human recombinant 8 Unit(s) SubCutaneous every 6 hours  levETIRAcetam  Solution 500 milliGRAM(s) Oral two times a day  midodrine 10 milliGRAM(s) Oral every 8 hours  pantoprazole   Suspension 40 milliGRAM(s) Oral daily  piperacillin/tazobactam IVPB.. 4.5 Gram(s) IV Intermittent every 8 hours  polyethylene glycol 3350 17 Gram(s) Oral daily  senna 2 Tablet(s) Oral at bedtime  sodium chloride 3%  Inhalation 4 milliLiter(s) Inhalation every 6 hours  ticagrelor 60 milliGRAM(s) Enteral Tube two times a day  valproic  acid Syrup 500 milliGRAM(s) Oral two times a day    MEDICATIONS  (PRN):  acetaminophen   Oral Liquid .. 650 milliGRAM(s) Oral every 6 hours PRN Temp greater or equal to 38C (100.4F), Mild Pain (1 - 3), Moderate Pain (4 - 6)  fentaNYL    Injectable 50 MICROGram(s) IV Push once PRN vent dyssynchrony  sodium chloride 0.9% lock flush 10 milliLiter(s) IV Push every 1 hour PRN Pre/post blood products, medications, blood draw, and to maintain line patency

## 2024-08-14 NOTE — PHARMACOTHERAPY INTERVENTION NOTE - COMMENTS
Recommended ammonia lab as patient is taking valproic acid. 
Recommended up-titration of low to moderate insulin sliding scale for additional glycemic control. 
As per policy, piperacillin-tazobactam IV 3.375 grams q8h adjusted to 4.5 grams IV q8h for treatment of pseudomonal pneumonia
Recommended reduction of standing insulin NPH dose based on glucose trend and current insulin requirements.
Recommended vancomycin IV dosed 1250 mg once (dose by level) for empiric coverage given history of MRSA colonization
[FreeTextEntry1] : Surveillance. Patient presents for her 3 month follow-up. \par \par She reports inguinal pain with going down the stairs. Otherwise has no complaints.

## 2024-08-14 NOTE — PROGRESS NOTE ADULT - ASSESSMENT
89 y/o M w/CAD, HFrEF, DM, CKD, prior CVAs, s/p PEG tube for dysphagia, admitted for sepsis secondary to UTI and MABLE on CKD likely ATN w/hospital course c/b cardiac arrest, prolonged downtime, likely severe anoxic brain injury. Acute respiratory failure with hypoxia and hypercapnia in setting of arrest. Hypotension likely severe sepsis with septic shock secondary to pseudomonal PNA.    - Hold sedation, EEG negative for seizures, poor prognosis for neurologic recovery  - MRI brain, will repeat EEG in a few days at family request - Pending transfer to [facility] for MRI once bed is available  - Continue mechanical ventilation, will likely need trach if consistent w/GOC  - Trend Cr, avoid nephrotoxins  - Broad spectrum abx  - Hypernatremia resolved  - DVT prophylaxis  - Full code    I have personally provided 35 minutes of attending critical care time excluding procedures.

## 2024-08-15 NOTE — PROGRESS NOTE ADULT - ASSESSMENT
89 y/o M w/CAD, HFrEF, DM, CKD, prior CVAs, s/p PEG tube for dysphagia, admitted for sepsis secondary to UTI and MABLE on CKD likely ATN w/hospital course c/b cardiac arrest, prolonged downtime, likely severe anoxic brain injury. Acute respiratory failure with hypoxia and hypercapnia in setting of arrest. Hypotension likely severe sepsis with septic shock secondary to pseudomonal PNA.    - Hold sedation, EEG negative for seizures, poor prognosis for neurologic recovery  - MRI brain, will repeat EEG in a few days at family request - Pending transfer to [facility] for MRI once bed is available  - Continue mechanical ventilation, will likely need trach if consistent w/GOC  - Trend Cr, avoid nephrotoxins  - Broad spectrum abx  - Hypernatremia resolved  - DVT prophylaxis  - Full code    I have personally provided 35 minutes of attending critical care time excluding procedures.

## 2024-08-15 NOTE — PROGRESS NOTE ADULT - SUBJECTIVE AND OBJECTIVE BOX
HPI:  89 years old male with h/o HTN, HLD, DM, HFrEF, CAD s/p CABG s/p stents ( last stent in 05/2022), s/p PPM, CKD, PVD, h/o CVA x 3, s/p PEG, acute cholecystitis s/p cholecystostomy ( not surgical candidate, s/p cholecystomy tube removal ? 8/1) was brought in to ED with decreased responsiveness. Patient is bedbound, minimally verbal, A&OX1 baseline. Patient was noted to be unresponsive last night.   Had episode of low BP, sat well at RA, afebrile. No leukocytosis, plt 283, lactate 3.7---> 2.8, Cr 2.65 ( 1.58 in 07/2024), CT chest/abd/pelvis with no acute pathology. Stable bilateral pleural effusions and comrpessive atelectasis. CT head with no acute pathology. No significant change compared with 3/16/2024. (07 Aug 2024 11:32)      24 hr events: No acute events.     ## ROS:  [x ] unable to obtain    ## Vitals  ICU Vital Signs Last 24 Hrs  T(C): 36.1 (15 Aug 2024 04:00), Max: 38.9 (14 Aug 2024 10:00)  T(F): 97 (15 Aug 2024 04:00), Max: 102 (14 Aug 2024 10:00)  HR: 91 (15 Aug 2024 07:10) (80 - 100)  BP: 111/46 (15 Aug 2024 07:00) (97/54 - 135/56)  BP(mean): 65 (15 Aug 2024 07:00) (63 - 95)  ABP: --  ABP(mean): --  RR: 20 (15 Aug 2024 07:10) (16 - 40)  SpO2: 100% (15 Aug 2024 07:10) (98% - 100%)    O2 Parameters below as of 15 Aug 2024 06:52  Patient On (Oxygen Delivery Method): ventilator            ## Physical Exam:  Gen: Elderly male lying in bed, intubated, sedated, NAD  HEENT: NC/AT sclerae anicteric. ET tube in place  Resp: Mechanical breath sounds b/l, overbreathes vent  CV: S1, S2  Abd: Soft, + BS  Ext: WWP  Neuro: Unresponsive. + GAG, pupils constricted and sluggish, + corneals     ## Vent Data  Mode: PS (Pressure Support)/ Spontaneous  FiO2: 25  PEEP: 5  PS: 10  ITime: 1.5  MAP: 10      ## Labs:  Chem:  08-15    137  |  107  |  65<H>  ----------------------------<  110<H>  4.3   |  20<L>  |  1.85<H>    Ca    7.9<L>      15 Aug 2024 04:36  Phos  4.8     08-15  Mg     2.8     08-15    TPro  6.3  /  Alb  1.2<L>  /  TBili  0.4  /  DBili  x   /  AST  46<H>  /  ALT  13  /  AlkPhos  79  08-15    LIVER FUNCTIONS - ( 15 Aug 2024 04:36 )  Alb: 1.2 g/dL / Pro: 6.3 gm/dL / ALK PHOS: 79 U/L / ALT: 13 U/L / AST: 46 U/L / GGT: x           CBC:                        7.4    13.91 )-----------( 268      ( 15 Aug 2024 03:40 )             22.7     Coags:      Urinalysis Basic - ( 15 Aug 2024 04:36 )    Color: x / Appearance: x / SG: x / pH: x  Gluc: 110 mg/dL / Ketone: x  / Bili: x / Urobili: x   Blood: x / Protein: x / Nitrite: x   Leuk Esterase: x / RBC: x / WBC x   Sq Epi: x / Non Sq Epi: x / Bacteria: x        ## Cardiac        ## Blood Gas      #I/Os  I&O's Detail    14 Aug 2024 07:01  -  15 Aug 2024 07:00  --------------------------------------------------------  IN:    Free Water: 1200 mL    Glucerna 1.5: 950 mL    IV PiggyBack: 100 mL  Total IN: 2250 mL    OUT:    Incontinent per Condom Catheter (mL): 800 mL  Total OUT: 800 mL    Total NET: 1450 mL          ## Imaging:    ## Medications:  MEDICATIONS  (STANDING):  albuterol/ipratropium for Nebulization 3 milliLiter(s) Nebulizer every 6 hours  artificial  tears Solution 1 Drop(s) Both EYES four times a day  aspirin  chewable 81 milliGRAM(s) Oral daily  chlorhexidine 0.12% Liquid 15 milliLiter(s) Oral Mucosa every 12 hours  chlorhexidine 2% Cloths 1 Application(s) Topical <User Schedule>  doxazosin 2 milliGRAM(s) Oral at bedtime  enoxaparin Injectable 30 milliGRAM(s) SubCutaneous every 24 hours  escitalopram 10 milliGRAM(s) Oral daily  insulin lispro (ADMELOG) corrective regimen sliding scale   SubCutaneous every 6 hours  insulin NPH human recombinant 4 Unit(s) SubCutaneous every 6 hours  levETIRAcetam  Solution 500 milliGRAM(s) Oral two times a day  midodrine 10 milliGRAM(s) Oral every 8 hours  pantoprazole   Suspension 40 milliGRAM(s) Oral daily  piperacillin/tazobactam IVPB.. 4.5 Gram(s) IV Intermittent every 8 hours  polyethylene glycol 3350 17 Gram(s) Oral daily  senna 2 Tablet(s) Oral at bedtime  sodium chloride 3%  Inhalation 4 milliLiter(s) Inhalation every 6 hours  ticagrelor 60 milliGRAM(s) Enteral Tube two times a day  valproic  acid Syrup 500 milliGRAM(s) Oral two times a day    MEDICATIONS  (PRN):  acetaminophen   Oral Liquid .. 650 milliGRAM(s) Oral every 6 hours PRN Temp greater or equal to 38C (100.4F), Mild Pain (1 - 3), Moderate Pain (4 - 6)  fentaNYL    Injectable 50 MICROGram(s) IV Push every 2 hours PRN agitation

## 2024-08-16 NOTE — PROGRESS NOTE ADULT - ASSESSMENT
Pt is a 89 yo M with h/o CAD, CABG, s/p stents, s/p PPM placement, HTN, HLD, DM, CKD, CVA x3, s/p PEG tube for dysphagia and acute cholecystitis s/p cholecystostomy ( not surgical candidate, s/p cholecystomy tube removal) initially presented/admitted on 8/7 2 to MS change; found to have UTI with sepsis and acute on CKD. On 8/8 pt s/p cardiac arrest. ICU dx: 1) Cardiac arrest with resultant anoxic encephalopathy 2) Acute respiratory failure with hypoxia and hypercapnia in setting of arrest 3) Shock state multifactorial; a) septic shock 2 to pseudomonal PNA b) post arrest myocardial dysfxn 4) Acute on CKD 2 to ATN. Current issues 1) MS not improving ( anoxic encephalopathy) 2) Leaking PEG site 3) Decreased Hb 6.8    Resp: May cont SBT but no extubation based on poor MS  ID: Tmax 101.7 with elevated WBC; ID consult  CVS: Cont Asa + Brilinta/ Cont Midodrine prn  Heme: Hb 6.8 (acute blood loss anemia); may transfuse 1 unit PRBC/ DVT prophylaxis with Lovenox  FEN: ? hold feeds  Endo: Follow Glu and cover with Lispro  GI: GI consult for leaking PEG site  Renal: Follow BUN/Cr and UO  Neuro: Pt with poor neurological prognosis/ F/u as per Neuro/ Spoke to [facility] ICU aware of pt ofr transfer for MRI head when bed available  Social: Discuss GOC/ trach/ Family at the bedside and updated today

## 2024-08-16 NOTE — CHART NOTE - NSCHARTNOTEFT_GEN_A_CORE
Assessment:  Pt seen in CCU unit, on vent, G-Tube to be changed to trickle feeding due to feeding leaking  via G-Tube  Pt adm c AMS, MABLE, generalized weakness.  ICU diagnosis include, cardiac arrest, anoxic encephalopathy, acute respiratory failure, shock state, septic shock, pneumonia, MABLE on CKD, leaking PEG site, decrease Hgb, no active bleeding, possible transfer plans to [facility], Palliative consult noted.   Pt is full code @ present.  PMH include CKD, CVA, MI, HTN, CAD, HLD, DM(was on Basglar 17 units) + on G-Tube feeding of Glucerna 1.5, 60 ml/hr x 22 hours ( per outpatient medication review), cholecystitis, not a surgical candidate, s/p cholecystostomy tube removal.      Factors impacting intake: [ ] none [ ] nausea  [ ] vomiting [ ] diarrhea [ ] constipation  [ ]chewing problems [ ] swallowing issues  [x ] other: acute illness; altered feeding tube function     Diet Prescription: Diet, NPO:   Tube Feeding Modality: Orogastric  Glucerna 1.5 Prince  Total Volume for 24 Hours (mL): 900  Intermittent  Starting Tube Feed Rate {mL per Hour}: 10    Every 4 hours  Until Goal Tube Feed Rate (mL per Hour): 50  Tube Feeding Hours ON: 18  Tube Feeding OFF (Hours): 6  Tube Feed Start Time: 17:00 (08-11-24 @ 09:06)  (has been receiving Glucerna 1.5, 900 wr=0047 calories, 74 grams protein, 683 ml free water)    Intake: unable to determine @ present due to plan for enteral feeding to trickle feeding     Current Weight: 08/16, 80.2 kg, 08/07, 72.3 kg, c wt. gain of 7.9 kg  % Weight Change: 10.9%  08/16, 2+(mild) generalized edema noted  I/O: 2200/800(+1400)    Pt is not appropriate for nutrition focus physical exam due to vent status c limited view, appears to have moderate orbital and temple depletion.    Pertinent Medications: MEDICATIONS  (STANDING):  albuterol/ipratropium for Nebulization 3 milliLiter(s) Nebulizer every 6 hours  artificial  tears Solution 1 Drop(s) Both EYES four times a day  aspirin  chewable 81 milliGRAM(s) Oral daily  chlorhexidine 0.12% Liquid 15 milliLiter(s) Oral Mucosa every 12 hours  chlorhexidine 2% Cloths 1 Application(s) Topical <User Schedule>  doxazosin 2 milliGRAM(s) Oral at bedtime  enoxaparin Injectable 30 milliGRAM(s) SubCutaneous every 24 hours  escitalopram 10 milliGRAM(s) Oral daily  insulin lispro (ADMELOG) corrective regimen sliding scale   SubCutaneous every 6 hours  levETIRAcetam  Solution 500 milliGRAM(s) Oral two times a day  midodrine 10 milliGRAM(s) Oral every 8 hours  pantoprazole   Suspension 40 milliGRAM(s) Oral daily  piperacillin/tazobactam IVPB.. 4.5 Gram(s) IV Intermittent every 8 hours  polyethylene glycol 3350 17 Gram(s) Oral daily  senna 2 Tablet(s) Oral at bedtime  sodium chloride 3%  Inhalation 4 milliLiter(s) Inhalation every 6 hours  ticagrelor 60 milliGRAM(s) Enteral Tube two times a day  valproic  acid Syrup 500 milliGRAM(s) Oral two times a day  zinc oxide 40% Paste 1 Application(s) Topical two times a day    MEDICATIONS  (PRN):  acetaminophen   Oral Liquid .. 650 milliGRAM(s) Oral every 6 hours PRN Temp greater or equal to 38C (100.4F), Mild Pain (1 - 3), Moderate Pain (4 - 6)  fentaNYL    Injectable 50 MICROGram(s) IV Push every 2 hours PRN agitation    Pertinent Labs: 08-16 Na137 mmol/L Glu 141 mg/dL<H> K+ 4.1 mmol/L Cr  1.97 mg/dL<H> BUN 65 mg/dL<H> 08-16 Phos 5.6 mg/dL<H> 08-16 Alb 1.3 g/dL<L>08-16 ALT 13 U/L AST 41 U/L<H> Alkaline Phosphatase 57 U/L   CAPILLARY BLOOD GLUCOSE      POCT Blood Glucose.: 242 mg/dL (16 Aug 2024 11:30)  POCT Blood Glucose.: 146 mg/dL (16 Aug 2024 05:03)  POCT Blood Glucose.: 127 mg/dL (15 Aug 2024 23:13)  POCT Blood Glucose.: 77 mg/dL (15 Aug 2024 17:42)    Skin:   Pressure ulcer x 2  1. sacrum; stage II  2. left heel; stage II    Estimated Needs:   [ x] no change since previous assessment(08/09)  [ ] recalculated:     Previous Nutrition Diagnosis: (08/09)  Inadequate Energy Intake  Etiology: inability to consume sufficient energy  Signs/Symptoms: NPO x 2 days  Goal/Expected Outcome: Pt to meet >50% needs via tolerated route; not met   Nutrition Diagnosis is [ x] ongoing  [ ] resolved [ ] not applicable       New Nutrition Diagnosis: [x ] not applicable     Interventions:   Recommend  [ ] Change Diet To:  [ ] Nutrition Supplement  [ x] Nutrition Support; trickle feeding c Glucerna 1.5 @ 20 ml/hr x 24 hours via G-Tube, if tolerated increase by 5 ml q 8 hours goal rate @ 40 ml/hr =960 ml, 1440 calories, 79 grams protein, 729 ml free water  or if renal indices without improvement, change to Nepro @ 20 ml/hr, increase by 5 ml q 8 hour to goal of 40 ml/hr via G-Tube=960 ml, 1728 calories, 78 grams protein, 698 ml free water   [ ] Other:     Monitoring and Evaluation:   [ ] PO intake [ x ] Tolerance to diet prescription [ x ] weights [ x ] labs[ x ] follow up per protocol  [ ] other:

## 2024-08-16 NOTE — CONSULT NOTE ADULT - ASSESSMENT
Pt is a 89 yo M with h/o CAD, CABG, s/p stents, s/p PPM placement, HTN, HLD, DM, CKD, CVA x3, s/p PEG tube for dysphagia and acute cholecystitis s/p cholecystostomy ( not surgical candidate, s/p cholecystomy tube removal) initially presented/admitted on 8/7 2 to MS change; found to have UTI with sepsis and acute on CKD. On 8/8 pt s/p cardiac arrest. ICU dx: 1) Cardiac arrest with resultant anoxic encephalopathy 2) Acute respiratory failure with hypoxia and hypercapnia in setting of arrest 3) Shock state multifactorial; a) septic shock 2 to pseudomonal PNA b) post arrest myocardial dysfxn 4) Acute on CKD 2 to ATN.   Called due to rising wbc count   patient has many possible etiologies : he has been on the ventilator for over a week and has sputum growing pseudomonas   he is very edematous with protein deficiency with albumin <2, can possibly have DVT vs third spacing   anemia of chronic disease, CKD, frequent blood draws all causing a drop in Hb   peg site also has some erythema and does not look like cellulitis but if so would be covered by zosyn   CXR reviewed with slight worsening though vent settings stable and perhaps even improved     Plan:  continue extended infusion high dose zosyn 4.5g q8hrs   follow sensitivities of pseudomonas   monitor his wbc count  avoid central lines and foleys   MRSA PCR nares   artifical tears for dry irritated eyes   if spikes fevers over the weekend despite antibiotics will consider blood cultures   can do DVT studies of lower extremity bilateral and UE bilateral   monitor stage 2 wound on sacrum  frequent turns, offloading, and nutrition optimization to avoid bedsores-protective heel/leg protectors     Discussed with [facility]      Ruddy Song DO  Chief, Infectious Disease at [facility]  Reachable via AVST Teams or ID office: [phone]  Weekdays: After 5pm, please call [phone] for all inquiries and new consults  Weekends: Message on-call infectious disease physician via teams (see [place]) 
89 year old male He was admitted for UTI with the increased lethargy attributed to increased MABLE and UTI now s/p cardiac arrest with ROSC and in shock state requiirng IV pressors to maintain MAP     Acute hypoxic respiratoy failure   -Patient currently on Full vent support  -titrate settings to maintain SaO2 >90%, or pH >7.25  -consider low titdal volume ventilation strategy w/ goal Tv 4-6 cc/kg of ideal body weight  -plateu pressure goal <30  -Peridex oral care and VAP prophylaxis with HOB 30 degrees   -aggressive chest PT and suctioning   -daily sedation vacation with spontaneous breathing trial if clinical condition warrants, discuss with respiratory therapy     Septic vs cardiogenic shock post arrest   -30 cc/kg fluid challenge without improvement in blood pressure  -patient currently on Levophed, will titrate for MAP 65-70  -repeat lactate  -blood/urine/sputum cultures, then initiate broad spectrum antibiotics  -follow cultures and narrow antibitoics as able  -goal UOP >0.5 cc/kg/hr  -procalcitonin     Cardiac arrest   -Repeat EKG  -Order echocardiogram  -F/U and trend troponin levels  -modified hypothermia protocol     UTI   -continue antibiotics   -Moss catherter  -Urine output >0.5cc/kg/hr  -follow up urine culture results     MABLE  -Hold nephrotoxic meds  -Continue aggressive hydration  -Trend urine output  -Follow up BUN/Creatinine  -follow up electrolytes     Critical Care time: 45 mins assessing presenting problems of acute illness that poses high probability of life threatening deterioration or end organ damage/dysfunction.  Medical decision making inculding Initiating plan of care, reviewing data, reviewing radiology,direct patient bedside evaluation and interpretation of vital signs, any necessary ventilator management , discusion with multidisciplinary team, discussing goals of care with patient/family, all non inclusive of procedures, COVID 19 specific considerations and therapeutic  options based on the available and rapidly changing literature 
Probable anoxic/toxic metabolic encephalopathy  Dementia  Seizure disorder/Myoclonus  Hx of strokes  S/P Cardiac arrest (8/8/24)  Respiratory failure  CAD s/p PPM  CKD    - poor overall prognosis of meaningful recovery  - no MR due to pacemaker  - VEEG pending  - optimize Depakote with a level close to . On Depakote 250mg TID  - May continue Keppra 500mg BID  - consider adding Vimpat 100mg BID  - minimize sedation  - on aspirin, Brilinta, and statin for secondary stroke prevention.  - discussed with daughter-in-law at bedside, but will call his son to discuss over the phone.   - discussed with Dr. Wang and team    Thank you for the consult. 
HPI:  89 years old male with h/o HTN, HLD, DM, HFrEF, CAD s/p CABG s/p stents ( last stent in 05/2022), s/p PPM, CKD, PVD, h/o CVA x 3, s/p PEG, acute cholecystitis s/p cholecystostomy ( not surgical candidate, s/p cholecystomy tube removal  was brought in to ED with decreased responsiveness. Patient is bedbound, minimally verbal, A&OX1 baseline. Patient was noted to be unresponsive night prior to hospital admission

## 2024-08-16 NOTE — PROGRESS NOTE ADULT - SUBJECTIVE AND OBJECTIVE BOX
HPI:  Pt is a 89 yo M with h/o CAD, CABG, s/p stents, s/p PPM placement, HTN, HLD, DM, CKD, CVA x3, s/p PEG tube for dysphagia and acute cholecystitis s/p cholecystostomy ( not surgical candidate, s/p cholecystomy tube removal) initially presented/admitted on  2 to MS change; found to have UTI with sepsis and acute on CKD. On  pt s/p cardiac arrest. ICU dx: 1) Cardiac arrest with resultant anoxic encephalopathy 2) Acute respiratory failure with hypoxia and hypercapnia in setting of arrest 3) Shock state multifactorial; a) septic shock 2 to pseudomonal PNA b) post arrest myocardial dysfxn 4) Acute on CKD 2 to ATN. Current issues 1) MS not improving ( anoxic encephalopathy) 2) Leaking PEG site 3) Decreased Hb 6.8      ## Labs:  CBC:                        6.8    18.97 )-----------( 226      ( 16 Aug 2024 10:20 )             21.3     Chem:      137  |  106  |  65<H>  ----------------------------<  141<H>  4.1   |  21<L>  |  1.97<H>    Ca    8.4<L>      16 Aug 2024 02:00  Phos  5.6       Mg     2.8         TPro  6.9  /  Alb  1.3<L>  /  TBili  0.4  /  DBili  x   /  AST  41<H>  /  ALT  13  /  AlkPhos  57      Coags:          ## Imaging:    ## Medications:  piperacillin/tazobactam IVPB.. 4.5 Gram(s) IV Intermittent every 8 hours    doxazosin 2 milliGRAM(s) Oral at bedtime  midodrine 10 milliGRAM(s) Oral every 8 hours    albuterol/ipratropium for Nebulization 3 milliLiter(s) Nebulizer every 6 hours  sodium chloride 3%  Inhalation 4 milliLiter(s) Inhalation every 6 hours    insulin lispro (ADMELOG) corrective regimen sliding scale   SubCutaneous every 6 hours    aspirin  chewable 81 milliGRAM(s) Oral daily  enoxaparin Injectable 30 milliGRAM(s) SubCutaneous every 24 hours  ticagrelor 60 milliGRAM(s) Enteral Tube two times a day    pantoprazole   Suspension 40 milliGRAM(s) Oral daily  polyethylene glycol 3350 17 Gram(s) Oral daily  senna 2 Tablet(s) Oral at bedtime    acetaminophen   Oral Liquid .. 650 milliGRAM(s) Oral every 6 hours PRN  escitalopram 10 milliGRAM(s) Oral daily  fentaNYL    Injectable 50 MICROGram(s) IV Push every 2 hours PRN  levETIRAcetam  Solution 500 milliGRAM(s) Oral two times a day  valproic  acid Syrup 500 milliGRAM(s) Oral two times a day      ## Vitals:  T(C): 37.3 (24 @ 07:41), Max: 38.1 (08-15-24 @ 12:00)  HR: 94 (24 @ 11:00) (82 - 106)  BP: 111/48 (24 @ 11:00) (95/52 - 124/53)  BP(mean): 66 (24 @ 11:00) (60 - 80)  RR: 19 (24 @ 11:00) (16 - 25)  SpO2: 100% (24 @ 11:00) (95% - 100%)  Wt(kg): --  Vent: Mode: PS (Pressure Support)/ Spontaneous, RR (patient): 24, FiO2: 25, PEEP: 5, PS: 10, PIP: 17  AB-15 @ 07:01  -   @ 07:00  --------------------------------------------------------  IN: 2200 mL / OUT: 800 mL / NET: 1400 mL     @ 07:01  -   @ 11:44  --------------------------------------------------------  IN: 50 mL / OUT: 0 mL / NET: 50 mL          ## P/E:  Gen: lying comfortably in bed in no apparent distress  Mouth: (+) ETT/ OGT  Lungs: L > R rhonchi  Heart: Tachy  Abd: Slightly distended but Soft/+BS/ (+) PEG site leaking enteral feeds  Ext: (+) edema  Neuro: Upward eye gaze/ No gag reflex/ ? cough reflex/ No respnse to painful stimuli/ (+) spont resp    CENTRAL LINE: [ ] YES [ ] NO  LOCATION:   DATE INSERTED:  REMOVE: [ ] YES [ ] NO      MARCH: [ ] YES [ ] NO    DATE INSERTED:  REMOVE:  [ ] YES [ ] NO      A-LINE:  [ ] YES [ ] NO  LOCATION:   DATE INSERTED:  REMOVE:  [ ] YES [ ] NO  EXPLAIN:    CODE STATUS: [x ] full code  [ ] DNR  [ ] DNI  [ ] MOLST  Goals of care discussion: [ ] yes

## 2024-08-16 NOTE — CHART NOTE - NSCHARTNOTEFT_GEN_A_CORE
Spoke with patient's son via phone [phone].  Aware patient is still pending transfer to [facility] for MRI.  Palliative is following and will assist with family meeting after MRI results are available.  He is aware if patient is transferred and remains at [facility] he can also have a family meeting with the team at [facility].

## 2024-08-17 NOTE — PROGRESS NOTE ADULT - SUBJECTIVE AND OBJECTIVE BOX
HPI:  Pt is a 89 yo M with h/o CAD, CABG, s/p stents, s/p PPM placement, HTN, HLD, DM, CKD, CVA x3, s/p PEG tube for dysphagia and acute cholecystitis s/p cholecystostomy ( not surgical candidate, s/p cholecystomy tube removal) initially presented/admitted on  2 to MS change; found to have UTI with sepsis and acute on CKD. On  pt s/p cardiac arrest (ROSC 13 min). ICU dx: 1) Cardiac arrest with resultant anoxic encephalopathy 2) Acute respiratory failure with hypoxia and hypercapnia in setting of arrest 3) Shock state multifactorial; a) septic shock 2 to pseudomonal PNA b) post arrest myocardial dysfxn 4) Acute on CKD 2 to ATN. Current issues 1) MS not improving ( anoxic encephalopathy) 2) Leaking PEG site 3) On 2024 Hb decreased 6.8; s/p 1 unit PRBC      ## Labs:  CBC:                        7.7    . )-----------( 237      ( 17 Aug 2024 03:39 )             23.8     Chem:      139  |  104  |  74<H>  ----------------------------<  219<H>  3.7   |  24  |  2.14<H>    Ca    8.2<L>      17 Aug 2024 03:39  Phos  5.8       Mg     2.9         TPro  7.0  /  Alb  1.4<L>  /  TBili  0.4  /  DBili  x   /  AST  33  /  ALT  15  /  AlkPhos  75      Coags:          ## Imaging:    ## Medications:  piperacillin/tazobactam IVPB.. 4.5 Gram(s) IV Intermittent every 8 hours    doxazosin 2 milliGRAM(s) Oral at bedtime  midodrine 10 milliGRAM(s) Oral every 8 hours    albuterol/ipratropium for Nebulization 3 milliLiter(s) Nebulizer every 6 hours  sodium chloride 3%  Inhalation 4 milliLiter(s) Inhalation every 6 hours    insulin lispro (ADMELOG) corrective regimen sliding scale   SubCutaneous every 6 hours    aspirin  chewable 81 milliGRAM(s) Oral daily  enoxaparin Injectable 30 milliGRAM(s) SubCutaneous every 24 hours  ticagrelor 60 milliGRAM(s) Enteral Tube two times a day    pantoprazole   Suspension 40 milliGRAM(s) Oral daily  polyethylene glycol 3350 17 Gram(s) Oral daily  senna 2 Tablet(s) Oral at bedtime    acetaminophen   Oral Liquid .. 650 milliGRAM(s) Oral every 6 hours PRN  escitalopram 10 milliGRAM(s) Oral daily  fentaNYL    Injectable 50 MICROGram(s) IV Push every 2 hours PRN  levETIRAcetam  Solution 500 milliGRAM(s) Oral two times a day  valproic  acid Syrup 500 milliGRAM(s) Oral two times a day      ## Vitals:  T(C): 36.6 (24 @ 10:00), Max: 38.3 (24 @ 16:00)  HR: 75 (24 @ 11:00) (68 - 106)  BP: 110/50 (24 @ 11:00) (100/53 - 141/52)  BP(mean): 69 (24 @ 11:00) (62 - 86)  RR: 19 (24 @ 11:00) (14 - 28)  SpO2: 98% (24 @ 11:00) (93% - 100%)  Wt(kg): --  Vent: Mode: PS (Pressure Support)/ Spontaneous, RR (patient): 29, FiO2: 25, PEEP: 5, PS: 10, PC: 10, PIP: 15  AB-16 @ 07:01  -   @ 07:00  --------------------------------------------------------  IN: 2440 mL / OUT: 650 mL / NET: 1790 mL          ## P/E:  Gen: lying comfortably in bed in no apparent distress  Mouth: (+) ETT/ OGT  Lungs: CTA  Heart: RRR  Abd: Soft/+BS/ (+) PEG site leaking enteral feeds  Ext: (+) edema  Neuro: Pupils/ No gag reflex/ (+) cough reflex/ No respnse to painful stimuli/ (+) spont resp    CENTRAL LINE: [ ] YES [ ] NO  LOCATION:   DATE INSERTED:  REMOVE: [ ] YES [ ] NO      MARCH: [ ] YES [ ] NO    DATE INSERTED:  REMOVE:  [ ] YES [ ] NO      A-LINE:  [ ] YES [ ] NO  LOCATION:   DATE INSERTED:  REMOVE:  [ ] YES [ ] NO  EXPLAIN:    CODE STATUS: [x ] full code  [ ] DNR  [ ] DNI  [ ] MOLST  Goals of care discussion: [ ] yes

## 2024-08-17 NOTE — PROGRESS NOTE ADULT - ASSESSMENT
Pt is a 89 yo M with h/o CAD, CABG, s/p stents, s/p PPM placement, HTN, HLD, DM, CKD, CVA x3, s/p PEG tube for dysphagia and acute cholecystitis s/p cholecystostomy ( not surgical candidate, s/p cholecystomy tube removal) initially presented/admitted on 8/7 2 to MS change; found to have UTI with sepsis and acute on CKD. On 8/8 pt s/p cardiac arrest (ROSC 13 min). ICU dx: 1) Cardiac arrest with resultant anoxic encephalopathy 2) Acute respiratory failure with hypoxia and hypercapnia in setting of arrest 3) Shock state multifactorial; a) septic shock 2 to pseudomonal PNA b) post arrest myocardial dysfxn 4) Acute on CKD 2 to ATN. Current issues 1) MS not improving ( anoxic encephalopathy) 2) Leaking PEG site 3) On 8/16/2024 Hb decreased 6.8; s/p 1 unit PRBC    Resp: Cont SBT but no extubation based on poor MS  ID: ID consult noted; will cont Zosyn/ Sputum with few Pseudomonas and few polys  CVS: Cont Asa + Brilinta/ Cont Midodrine prn  Heme: s/p 1 unit PRBC; Hb today 7.7/ DVT prophylaxis with Lovenox  FEN: Cont low rate PEG feeds  Endo: Follow Glu and cover with Lispro (may need to increase coverage)  GI: GI consult for leaking PEG site  Renal: Follow BUN/Cr and UO  Neuro: Pt with poor neurological prognosis/ F/u as per Neuro/ Again spoke to [facility] ICU aware of pt for transfer for MRI head when bed available  Social: Discuss GOC/ trach/ Today son (Neurologist) at the bedside and updated

## 2024-08-17 NOTE — PROGRESS NOTE ADULT - SUBJECTIVE AND OBJECTIVE BOX
Neurology Progress Note    No acute events overnight.     Neuro Exam: Intubated and not sedated. Eyes slightly open and widens with painful stimuli. No eye contact. PERRL and sluggish at 3mm. No facial asymmetry. No grimace to pain. +corneal reflex bilaterally. Minimal withdrawal to pain in both arms. Triple flexion response in legs. Increased tone throughout.      CT head (8/9/24)- no acute process. Personally reviewed  CT head wo (8/12/24)- no acute process  NSE (8/11/24)- 46.2 (day 3 after cardiac arrest)  48 hour VEEG- severe diffuse cerebral dysfunction that may be the result of either diffuse anoxic brain injury or sedative medication effects.  EEG (8/15/24)- severe diffuse slowing. No seizures.    A/P:   Probable anoxic/toxic metabolic encephalopathy  Dementia  Seizure disorder  Hx of strokes  S/P Cardiac arrest (8/8/24)  Respiratory failure  Pseudomonas PNA  CAD s/p PPM  CKD    - poor overall prognosis of meaningful recovery  - no MR due to pacemaker here at [place]. Family wants MRI brain for prognosis. Patient is pending transfer to [facility]. Family understands MRI brain findings will not change prognosis even if MRI brain is negative for acute process.  - continue Depakote 500mg BID, Keppra 500mg BID  - on aspirin, Brilinta, and statin for secondary stroke prevention.  - discussed with family at bedside and group phone call with son, Dr. Shaik Beltran.  Neurology Progress Note    No acute events overnight.     Neuro Exam: Intubated and not sedated. Eyes slightly open and widens with painful stimuli. No eye contact. PERRL and sluggish at 3mm. No facial asymmetry. No grimace to pain. +corneal reflex bilaterally. Minimal withdrawal to pain in both arms. Triple flexion response in legs. Increased tone throughout.      CT head (8/9/24)- no acute process. Personally reviewed  CT head wo (8/12/24)- no acute process  NSE (8/11/24)- 46.2 (day 3 after cardiac arrest)  48 hour VEEG- severe diffuse cerebral dysfunction that may be the result of either diffuse anoxic brain injury or sedative medication effects.  EEG (8/15/24)- severe diffuse slowing. No seizures.    A/P:   Probable anoxic/toxic metabolic encephalopathy  Dementia  Seizure disorder  Hx of strokes  S/P Cardiac arrest (8/8/24)  Respiratory failure  Pseudomonas PNA  CAD s/p PPM  CKD    - poor overall prognosis of meaningful recovery  - no MR due to pacemaker here at [place]. Family wants MRI brain for prognosis. Patient is pending transfer to [facility]. Family understands MRI brain findings will not change prognosis even if MRI brain is negative for acute process.  - continue Depakote 500mg BID, Keppra 500mg BID  - on aspirin, Brilinta, and statin for secondary stroke prevention.  - will try modafinil 100mg BID as per family's request to stimulate mental status.  - discussed with family at bedside and group phone call with son,  Shaik Devin.

## 2024-08-18 NOTE — PATIENT PROFILE ADULT - FUNCTIONAL ASSESSMENT - BASIC MOBILITY 6.
1-calculated by average/Not able to assess (calculate score using [facility] averaging method)

## 2024-08-18 NOTE — PROGRESS NOTE ADULT - SUBJECTIVE AND OBJECTIVE BOX
Neurology Progress Note    No acute events overnight.     Neuro Exam: Intubated and not sedated. Eyes open and no eye contact. PERRL and sluggish at 3mm. No facial asymmetry. No grimace to pain. +corneal reflex bilaterally. Minimal withdrawal to pain in both arms. Triple flexion response in legs. Increased tone throughout.      CT head (8/9/24)- no acute process. Personally reviewed  CT head wo (8/12/24)- no acute process  NSE (8/11/24)- 46.2 (day 3 after cardiac arrest)  48 hour VEEG- severe diffuse cerebral dysfunction that may be the result of either diffuse anoxic brain injury or sedative medication effects.  EEG (8/15/24)- severe diffuse slowing. No seizures.    A/P:   Probable anoxic/toxic metabolic encephalopathy  Dementia  Seizure disorder  Hx of strokes  S/P Cardiac arrest (8/8/24)  Respiratory failure  Pseudomonas PNA  CAD s/p PPM  CKD    - poor overall prognosis of meaningful recovery  - no MR due to pacemaker here at [place]. Family wants MRI brain for prognosis. Patient is pending transfer to [facility]. Family understands MRI brain findings will not change prognosis even if MRI brain is negative for acute process.  - continue Depakote 500mg BID, Keppra 500mg BID  - on aspirin, Brilinta, and statin for secondary stroke prevention.  - on modafinil 100mg BID as per family's request to stimulate mental status. Started on 8/17/24

## 2024-08-18 NOTE — H&P ADULT - ASSESSMENT
Pt is a 89 yo M with h/o CAD, CABG, s/p stents, s/p PPM placement, HTN, HLD, DM, CKD, CVA x3, s/p PEG tube for dysphagia and acute cholecystitis s/p cholecystostomy ( not surgical candidate, s/p cholecystomy tube removal) initially presented/admitted on 8/7 2 to MS change; found to have UTI with sepsis and acute on CKD. On 8/8 pt s/p PEA cardiac arrest likely 2/2 AHRF 2/2 aspiration. ROSC at 13mins, transferred to CCU on pressors. ICU dx: 1) Cardiac arrest with resultant anoxic encephalopathy 2) Acute respiratory failure with hypoxia and hypercapnia in setting of arrest 3) Shock state multifactorial; a) septic shock 2 to pseudomonal PNA b) post arrest myocardial dysfxn 4) Acute on CKD 2 to ATN. Current issues 1) MS not improving ( anoxic encephalopathy) 2) Leaking PEG site 3) Decreased Hb 6.8. Transferred to [facility] MICU for MRI w pacemaker compatibility for prognostication for post PEA arrest anoxic encepalopathy.      NEURO:  #Mental status: AAOx0. Baseline AAOx1. Likely 2/2 anoxic encephalopathy .  - MRI for prognostication post PEA arrest   - continue Depakote 500mg BID, Keppra 500mg BID  - on aspirin, Brilinta, and statin for secondary stroke prevention.  - on modafinil 100mg BID as per family's request to stimulate mental status. Started on 8/17/24    CV:  #HD stable  - No pressor requirements   - Midodrine 10mg q8    #PEA arrest     #CHF EF 45%  -Diuretic: torsemide 20mg qd     #HTN/HLD:   - Not on any home medications     PULM:  #Intubated 2/2 aspiration pna  - CPAP 10/5, SBT at 5/5, attempt to extubate 8/19    RENAL:  #MABLE:   -Trend I/Os and daily weights, and Cr. 1.5 --> 1.9    GI:  #PEG site leaking  - Resolved, no active leak  #Diet: PEG feeds resumed, glucerna @60cc/hr   - Pantpoprozole for GI prophylaxis   - Bowel reg w senna/miralax       ENDO:  #DM2: HbA1c 7.7%   - Insulin Sliding Scale  - Insulin Glargine 17U   - ISS    HEMATOLOGIC:  #Anemia of chronic disease  - s/p 1 unit PRBC 8/16   #Coag panel shows  #DVT prophylaxis with lovenox    ID:  #ASA PNA   - Sputum cx + pseudomonas, pansensitive   - continue extended infusion high dose zosyn 4.5g q8hrs 8/8-8/20   - Monitor for signs of infection       Ethics:  - Full code     Dispo:   - MICU, transfer back to [place] after MR     Pt is a 89 yo M with h/o CAD, CABG, s/p stents, s/p PPM placement, HTN, HLD, DM, CKD, CVA x3, s/p PEG tube for dysphagia and acute cholecystitis s/p cholecystostomy ( not surgical candidate, s/p cholecystomy tube removal) initially presented/admitted on 8/7 2 to MS change; found to have UTI with sepsis and acute on CKD. On 8/8 pt s/p PEA cardiac arrest likely 2/2 AHRF 2/2 aspiration. ROSC at 13mins, transferred to CCU on pressors. ICU dx: 1) Cardiac arrest with resultant anoxic encephalopathy 2) Acute respiratory failure with hypoxia and hypercapnia in setting of arrest 3) Shock state multifactorial; a) septic shock 2 to pseudomonal PNA b) post arrest myocardial dysfxn 4) Acute on CKD 2 to ATN. Current issues 1) MS not improving ( anoxic encephalopathy) 2) Leaking PEG site 3) Decreased Hb 6.8. Transferred to [facility] MICU for MRI w pacemaker compatibility for prognostication for post PEA arrest anoxic encepalopathy.      NEURO:  #Mental status: AAOx0. Baseline AAOx1. Likely 2/2 anoxic encephalopathy .  - MRI for prognostication post PEA arrest   - continue Depakote 500mg BID, Keppra 500mg BID  - on aspirin, Brilinta, and statin for secondary stroke prevention.  - on modafinil 100mg BID as per family's request to stimulate mental status. Started on 8/17/24  -     CV:  #HD stable  - No pressor requirements   - Midodrine 10mg q8    #PEA arrest     #CHF EF 45%  -Diuretic: torsemide 20mg qd     #HTN/HLD:   - Not on any home medications     PULM:  #Intubated 2/2 aspiration pna  - CPAP 10/5, SBT at 5/5, attempt to extubate 8/19  - Duonebs/Nebulized 3% NS for pulmonary toiletining   - Chest IPV     RENAL:  #MABLE:   -Trend I/Os and daily weights, and Cr. 1.5 --> 1.9    GI:  #PEG site leaking  - Resolved, no active leak  #Diet: PEG feeds resumed    - Pantpoprozole for GI prophylaxis   - Bowel reg w senna/miralax       ENDO:  #DM2: HbA1c 7.7%   - Insulin Sliding Scale  - Insulin Glargine 17U   - ISS    HEMATOLOGIC:  #Anemia of chronic disease  - s/p 1 unit PRBC 8/16   #Coag panel shows  #DVT prophylaxis with lovenox    ID:  #ASA PNA   - Sputum cx + pseudomonas, pansensitive   - continue extended infusion high dose zosyn 4.5g q8hrs 8/8-8/20   - Monitor for signs of infection     Psych:  #Depression  - Continue home medication lexapro     Ethics:  - Full code     Dispo:   - MICU, transfer back to [place] after MR     Pt is a 89 yo M with h/o CAD, CABG, s/p stents, s/p PPM placement, HTN, HLD, DM, CKD, CVA x3, s/p PEG tube for dysphagia and acute cholecystitis s/p cholecystostomy ( not surgical candidate, s/p cholecystomy tube removal) initially presented/admitted on 8/7 2 to MS change; found to have UTI with sepsis and acute on CKD. On 8/8 pt s/p PEA cardiac arrest likely 2/2 AHRF 2/2 aspiration. ROSC at 13mins, transferred to CCU on pressors. ICU dx: 1) Cardiac arrest with resultant anoxic encephalopathy 2) Acute respiratory failure with hypoxia and hypercapnia in setting of arrest 3) Shock state multifactorial; a) septic shock 2 to pseudomonal PNA b) post arrest myocardial dysfxn 4) Acute on CKD 2 to ATN. Current issues 1) MS not improving ( anoxic encephalopathy) 2) Leaking PEG site 3) Decreased Hb 6.8. Transferred to [facility] MICU for MRI w pacemaker compatibility for prognostication for post PEA arrest anoxic encepalopathy.      NEURO:  #Mental status: AAOx0. Baseline AAOx1. Likely 2/2 anoxic encephalopathy .  - MRI for prognostication post PEA arrest   - continue Depakote 500mg BID, Keppra 500mg BID  - on aspirin, Brilinta, and statin for secondary stroke prevention.  - on modafinil 100mg BID as per family's request to stimulate mental status. Started on 8/17/24    CV:  #HD stable  - No pressor requirements   - Midodrine 10mg q8    #PEA arrest     #CHF EF 45%  - GDMT currently held    #HTN/HLD:   - Not on any home medications     PULM:  #Intubated 2/2 aspiration pna  - CPAP 5/5, triggering spontaneously  - Duonebs/Nebulized 3% NS for pulmonary toiletining   - Chest IPV     RENAL:  #MABLE:   -Trend I/Os and daily weights, and Cr. 1.5 --> 1.9    GI:  #PEG site leaking  - Resolved, no active leak  #Diet: PEG feeds resumed    - Pantpoprozole for GI prophylaxis   - Bowel reg w senna/miralax       ENDO:  #DM2: HbA1c 7.7%   - Insulin Sliding Scale  - Insulin Glargine 17U   - ISS    HEMATOLOGIC:  #Anemia of chronic disease  - s/p 1 unit PRBC 8/16   #Coag panel shows  #DVT prophylaxis with lovenox    ID:  #ASA PNA   - Sputum cx + pseudomonas, pansensitive   - continue extended infusion high dose zosyn 4.5g q8hrs 8/8-8/20   - Monitor for signs of infection     Psych:  #Depression  - Continue home medication lexapro     Ethics:  - Full code     Dispo:   - MICU, transfer back to [place] after MR     Pt is a 89 yo M with h/o CAD, CABG, s/p stents, s/p PPM placement, HTN, HLD, DM, CKD, CVA x3, s/p PEG tube for dysphagia and acute cholecystitis s/p cholecystostomy ( not surgical candidate, s/p cholecystomy tube removal) initially presented/admitted on 8/7 2 to MS change; found to have UTI with sepsis and acute on CKD. On 8/8 pt s/p PEA cardiac arrest likely 2/2 AHRF 2/2 aspiration. ROSC at 13mins, transferred to CCU on pressors. ICU dx: 1) Cardiac arrest with resultant anoxic encephalopathy 2) Acute respiratory failure with hypoxia and hypercapnia in setting of arrest 3) Shock state multifactorial; a) septic shock 2 to pseudomonal PNA b) post arrest myocardial dysfxn 4) Acute on CKD 2 to ATN. Current issues 1) MS not improving ( anoxic encephalopathy) 2) Leaking PEG site 3) Decreased Hb 6.8. Transferred to [facility] MICU for MRI w pacemaker compatibility for prognostication for post PEA arrest anoxic encepalopathy.      NEURO:  #Mental status: AAOx0. Baseline AAOx1. Likely 2/2 anoxic encephalopathy .  - MRI for prognostication post PEA arrest   - continue Depakote 500mg BID, Keppra 500mg BID  - on aspirin, Brilinta, and statin for secondary stroke prevention.  - on modafinil 100mg BID as per family's request to stimulate mental status. Started on 8/17/24    CV:  #HD stable  - No pressor requirements   - Midodrine 10mg q8    #PEA arrest     #CHF EF 45%  - GDMT currently held    #HTN/HLD:   - Not on any home medications     # h/o ppm  - EP consult in AM for interrogation so patient can get MRI    PULM:  #Intubated 2/2 aspiration pna  - CPAP 5/5, triggering spontaneously  - Duonebs/Nebulized 3% NS for pulmonary toiletining   - Chest IPV     RENAL:  #MABLE:   -Trend I/Os and daily weights, and Cr. 1.5 --> 1.9    GI:  #PEG site leaking  - Resolved, no active leak  #Diet: PEG feeds resumed    - Pantpoprozole for GI prophylaxis   - Bowel reg w senna/miralax       ENDO:  #DM2: HbA1c 7.7%   - Insulin Sliding Scale  - Insulin Glargine 17U   - ISS    HEMATOLOGIC:  #Anemia of chronic disease  - s/p 1 unit PRBC 8/16   #Coag panel shows  #DVT prophylaxis with lovenox    ID:  #ASA PNA   - Sputum cx + pseudomonas, pansensitive   - continue extended infusion high dose zosyn 4.5g q8hrs 8/8-8/20   - Monitor for signs of infection     Psych:  #Depression  - Continue home medication lexapro     Ethics:  - Full code     Dispo:   - MICU, transfer back to [place] after MR

## 2024-08-18 NOTE — H&P ADULT - NSHPLABSRESULTS_GEN_ALL_CORE
LABS:                        7.3    15.41 )-----------( 243      ( 18 Aug 2024 02:40 )             22.5     08-18    138  |  103  |  68<H>  ----------------------------<  181<H>  3.1<L>   |  24  |  1.96<H>    Ca    8.1<L>      18 Aug 2024 02:40  Phos  4.6     08-18  Mg     2.9     08-18    TPro  6.8  /  Alb  1.3<L>  /  TBili  0.3  /  DBili  x   /  AST  33  /  ALT  15  /  AlkPhos  68  08-18          Urinalysis Basic - ( 18 Aug 2024 02:40 )    Color: x / Appearance: x / SG: x / pH: x  Gluc: 181 mg/dL / Ketone: x  / Bili: x / Urobili: x   Blood: x / Protein: x / Nitrite: x   Leuk Esterase: x / RBC: x / WBC x   Sq Epi: x / Non Sq Epi: x / Bacteria: x      I&O's Summary    BNP    RADIOLOGY & ADDITIONAL STUDIES:    TELEMETRY: reviewed    EKG: reviewed

## 2024-08-18 NOTE — PATIENT PROFILE ADULT - PATIENT'S PREFERRED PRONOUN
[FreeTextEntry3] : Written by Laura Hoffmann, acting as a scribe for Dr. Xiomara Carrion.\par This note accurately reflects the work and decisions made by me.  Him/He

## 2024-08-18 NOTE — PROGRESS NOTE ADULT - SUBJECTIVE AND OBJECTIVE BOX
HPI:  89 years old male with h/o HTN, HLD, DM, HFrEF, CAD s/p CABG s/p stents ( last stent in 2022), s/p PPM, CKD, PVD, h/o CVA x 3, s/p PEG, acute cholecystitis s/p cholecystostomy ( not surgical candidate, s/p cholecystomy tube removal ? ) was brought in to ED with decreased responsiveness. Patient is bedbound, minimally verbal, A&OX1 baseline. Patient was noted to be unresponsive last night.   Had episode of low BP, sat well at RA, afebrile. No leukocytosis, plt 283, lactate 3.7---> 2.8, Cr 2.65 ( 1.58 in 2024), CT chest/abd/pelvis with no acute pathology. Stable bilateral pleural effusions and comrpessive atelectasis. CT head with no acute pathology. No significant change compared with 3/16/2024. (07 Aug 2024 11:32)      24 hr events:      ## Labs:  CBC:                        7.3    15.41 )-----------( 243      ( 18 Aug 2024 02:40 )             22.5     Chem:  08-18    138  |  103  |  68<H>  ----------------------------<  181<H>  3.1<L>   |  24  |  1.96<H>    Ca    8.1<L>      18 Aug 2024 02:40  Phos  4.6     08-18  Mg     2.9     -18    TPro  6.8  /  Alb  1.3<L>  /  TBili  0.3  /  DBili  x   /  AST  33  /  ALT  15  /  AlkPhos  68  08-18    Coags:          ## Imaging:    ## Medications:  piperacillin/tazobactam IVPB.. 4.5 Gram(s) IV Intermittent every 8 hours    doxazosin 2 milliGRAM(s) Oral at bedtime  midodrine 10 milliGRAM(s) Oral every 8 hours    albuterol/ipratropium for Nebulization 3 milliLiter(s) Nebulizer every 6 hours  sodium chloride 3%  Inhalation 4 milliLiter(s) Inhalation every 6 hours    insulin lispro (ADMELOG) corrective regimen sliding scale   SubCutaneous every 6 hours    aspirin  chewable 81 milliGRAM(s) Oral daily  enoxaparin Injectable 30 milliGRAM(s) SubCutaneous every 24 hours  ticagrelor 60 milliGRAM(s) Enteral Tube two times a day    pantoprazole   Suspension 40 milliGRAM(s) Oral daily  polyethylene glycol 3350 17 Gram(s) Oral daily  senna 2 Tablet(s) Oral at bedtime    acetaminophen   Oral Liquid .. 650 milliGRAM(s) Oral every 6 hours PRN  escitalopram 10 milliGRAM(s) Oral daily  fentaNYL    Injectable 50 MICROGram(s) IV Push every 2 hours PRN  levETIRAcetam  Solution 500 milliGRAM(s) Oral two times a day  modafinil 100 milliGRAM(s) Oral <User Schedule>  valproic  acid Syrup 500 milliGRAM(s) Oral two times a day      ## Vitals:  T(C): 37.3 (24 @ 13:00), Max: 38 (24 @ 21:00)  HR: 91 (24 @ :00) (72 - 102)  BP: 120/45 (24 @ 13:00) (100/55 - 126/49)  BP(mean): 67 (24 @ 13:00) (63 - 88)  RR: 18 (24 @ 13:00) (17 - 26)  SpO2: 93% (24 @ 13:00) (90% - 99%)  Wt(kg): --  Vent: Mode: PS (Pressure Support)/ Spontaneous, RR (patient): 26, FiO2: 25, PEEP: 5, PIP: 16  AB-17 @ 07:  -   @ 07:00  --------------------------------------------------------  IN: 1120 mL / OUT: 420 mL / NET: 700 mL     @ 07:01  -   @ 14:13  --------------------------------------------------------  IN: 540 mL / OUT: 0 mL / NET: 540 mL          ## P/E:  Gen: lying comfortably in bed in no apparent distress  Mouth: (+) ETT  Lungs: Rhonchi  Heart: Tachy  Abd: Soft/+BS/ (+) PEG   Ext: (+) edema  Neuro: Pupils 2mm/ No gag reflex/ (+) cough reflex/ No respnse to painful stimuli/ (+) spont resp    CENTRAL LINE: [ ] YES [ ] NO  LOCATION:   DATE INSERTED:  REMOVE: [ ] YES [ ] NO      MARCH: [ ] YES [ ] NO    DATE INSERTED:  REMOVE:  [ ] YES [ ] NO      A-LINE:  [ ] YES [ ] NO  LOCATION:   DATE INSERTED:  REMOVE:  [ ] YES [ ] NO  EXPLAIN:    CODE STATUS: [x ] full code  [ ] DNR  [ ] DNI  [ ] MOLST  Goals of care discussion: [ ] yes    HPI:  Pt is a 89 yo M with h/o CAD, CABG, s/p stents, s/p PPM placement, HTN, HLD, DM, CKD, CVA x3, s/p PEG tube for dysphagia and acute cholecystitis s/p cholecystostomy ( not surgical candidate, s/p cholecystomy tube removal) initially presented/admitted on  2 to MS change; found to have UTI with sepsis and acute on CKD. On  pt s/p cardiac arrest (ROSC 13 min). ICU dx: 1) Cardiac arrest with resultant anoxic encephalopathy 2) Acute respiratory failure with hypoxia and hypercapnia in setting of arrest 3) Shock state multifactorial; a) septic shock 2 to pseudomonal PNA b) post arrest myocardial dysfxn 4) Acute on CKD 2 to ATN. Current issues 1) MS not improving ( anoxic encephalopathy) 2) Leaking PEG site 3) On 2024 Hb decreased 6.8; s/p 1 unit PRBC      ## Labs:  CBC:                        7.3    15.41 )-----------( 243      ( 18 Aug 2024 02:40 )             22.5     Chem:      138  |  103  |  68<H>  ----------------------------<  181<H>  3.1<L>   |  24  |  1.96<H>    Ca    8.1<L>      18 Aug 2024 02:40  Phos  4.6       Mg     2.9         TPro  6.8  /  Alb  1.3<L>  /  TBili  0.3  /  DBili  x   /  AST  33  /  ALT  15  /  AlkPhos  68  -    Coags:          ## Imaging:    ## Medications:  piperacillin/tazobactam IVPB.. 4.5 Gram(s) IV Intermittent every 8 hours    doxazosin 2 milliGRAM(s) Oral at bedtime  midodrine 10 milliGRAM(s) Oral every 8 hours    albuterol/ipratropium for Nebulization 3 milliLiter(s) Nebulizer every 6 hours  sodium chloride 3%  Inhalation 4 milliLiter(s) Inhalation every 6 hours    insulin lispro (ADMELOG) corrective regimen sliding scale   SubCutaneous every 6 hours    aspirin  chewable 81 milliGRAM(s) Oral daily  enoxaparin Injectable 30 milliGRAM(s) SubCutaneous every 24 hours  ticagrelor 60 milliGRAM(s) Enteral Tube two times a day    pantoprazole   Suspension 40 milliGRAM(s) Oral daily  polyethylene glycol 3350 17 Gram(s) Oral daily  senna 2 Tablet(s) Oral at bedtime    acetaminophen   Oral Liquid .. 650 milliGRAM(s) Oral every 6 hours PRN  escitalopram 10 milliGRAM(s) Oral daily  fentaNYL    Injectable 50 MICROGram(s) IV Push every 2 hours PRN  levETIRAcetam  Solution 500 milliGRAM(s) Oral two times a day  modafinil 100 milliGRAM(s) Oral <User Schedule>  valproic  acid Syrup 500 milliGRAM(s) Oral two times a day      ## Vitals:  T(C): 37.3 (24 @ 13:00), Max: 38 (24 @ 21:00)  HR: 91 (24 @ 13:00) (72 - 102)  BP: 120/45 (24 @ 13:00) (100/55 - 126/49)  BP(mean): 67 (24 @ 13:00) (63 - 88)  RR: 18 (24 @ 13:00) (17 - 26)  SpO2: 93% (24 @ 13:00) (90% - 99%)  Wt(kg): --  Vent: Mode: PS (Pressure Support)/ Spontaneous, RR (patient): 26, FiO2: 25, PEEP: 5, PIP: 16  AB-17 @ 07:  -   @ 07:00  --------------------------------------------------------  IN: 1120 mL / OUT: 420 mL / NET: 700 mL     @ 07:01  -   @ 14:13  --------------------------------------------------------  IN: 540 mL / OUT: 0 mL / NET: 540 mL          ## P/E:  Gen: lying comfortably in bed in no apparent distress  Mouth: (+) ETT  Lungs: Rhonchi  Heart: Tachy  Abd: Soft/+BS/ (+) PEG   Ext: (+) edema  Neuro: Pupils 2mm/ No gag reflex/ (+) cough reflex/ No response to painful stimuli to UE but response in LE/ (+) spont resp    CENTRAL LINE: [ ] YES [ ] NO  LOCATION:   DATE INSERTED:  REMOVE: [ ] YES [ ] NO      MARCH: [ ] YES [ ] NO    DATE INSERTED:  REMOVE:  [ ] YES [ ] NO      A-LINE:  [ ] YES [ ] NO  LOCATION:   DATE INSERTED:  REMOVE:  [ ] YES [ ] NO  EXPLAIN:    CODE STATUS: [x ] full code  [ ] DNR  [ ] DNI  [ ] MOLST  Goals of care discussion: [ ] yes

## 2024-08-18 NOTE — PATIENT PROFILE ADULT - VISION (WITH CORRECTIVE LENSES IF THE PATIENT USUALLY WEARS THEM):
patient is unresponsive/Severely impaired: cannot locate objects without hearing or touching them or patient nonresponsive.

## 2024-08-18 NOTE — H&P ADULT - NSHPPHYSICALEXAM_GEN_ALL_CORE
T(C): 37.2 (08-18-24 @ 17:12), Max: 38 (08-17-24 @ 21:00)  HR: 90 (08-18-24 @ 17:12) (72 - 102)  BP: 121/49 (08-18-24 @ 17:12) (107/73 - 131/61)  RR: 18 (08-18-24 @ 17:12) (17 - 26)  SpO2: 100% (08-18-24 @ 17:12) (90% - 100%)    GENERAL: chronically ill  NECK: No JVD  LUNG: CTAB  HEART: RRR, no m/g/r  ABDOMEN: Soft, slightly distended, nontender. +PEG tube, c/d/i  NEUROLOGY: A&Ox0, off sedation, does not interact, does not follow commands  SKIN: No rashes or lesions

## 2024-08-18 NOTE — PROGRESS NOTE ADULT - ASSESSMENT
Pt is a 89 yo M with h/o CAD, CABG, s/p stents, s/p PPM placement, HTN, HLD, DM, CKD, CVA x3, s/p PEG tube for dysphagia and acute cholecystitis s/p cholecystostomy ( not surgical candidate, s/p cholecystomy tube removal) initially presented/admitted on 8/7 2 to MS change; found to have UTI with sepsis and acute on CKD. On 8/8 pt s/p cardiac arrest (ROSC 13 min). ICU dx: 1) Cardiac arrest with resultant anoxic encephalopathy 2) Acute respiratory failure with hypoxia and hypercapnia in setting of arrest 3) Shock state multifactorial; a) septic shock 2 to pseudomonal PNA b) post arrest myocardial dysfxn 4) Acute on CKD 2 to ATN. Current issues 1) MS not improving ( anoxic encephalopathy) 2) Leaking PEG site 3) On 8/16/2024 Hb decreased 6.8; s/p 1 unit PRBC    Resp: Cont SBT but no extubation based on poor MS  ID: ID consult noted; Cont Zosyn/ Sputum with few Pseudomonas and few polys  CVS: Cont Asa + Brilinta/ Cont Midodrine prn  Heme: s/p 1 unit PRBC on 8/16; Hb today 7.3/ DVT prophylaxis with Lovenox  FEN: Cont PEG feeds/ Replace K; pt hypokalemic  Endo: Follow Glu and cover with Lispro (may need to increase coverage)  GI: GI consult for leaking PEG site  Renal: Follow BUN/Cr and UO  Neuro: Pt with poor neurological prognosis/ Neuro f/u noted/ Spoke to [facility] ICU today and a bed is available; pt to be transferred for MRI head  Social: Cont GOC/ On 8/17 pt's son (Neurologist) at the bedside and updated/ Today family member at the bedside; I told there is a bed available and we would accept the pt back after the MRI

## 2024-08-18 NOTE — PATIENT PROFILE ADULT - LANGUAGE ASSISTANCE NEEDED
unresponsive, unable to assess/No-Patient/Caregiver offered and refused free interpretation services.

## 2024-08-18 NOTE — PROGRESS NOTE ADULT - REASON FOR ADMISSION
AMS, MABLE on CKD, UTI

## 2024-08-18 NOTE — PROGRESS NOTE ADULT - PROVIDER SPECIALTY LIST ADULT
Critical Care
Neurology
Neurology
Critical Care
Neurology
Critical Care
Palliative Care
Hospitalist

## 2024-08-18 NOTE — PATIENT PROFILE ADULT - FALL HARM RISK - RISK INTERVENTIONS

## 2024-08-18 NOTE — H&P ADULT - HISTORY OF PRESENT ILLNESS
Pt is a 89 yo M with h/o CAD, CABG, s/p stents, s/p PPM placement, HTN, HLD, DM, CKD, CVA x3, s/p PEG tube for dysphagia and acute cholecystitis s/p cholecystostomy ( not surgical candidate, s/p cholecystomy tube removal) initially presented/admitted on 8/7 2 to MS change; found to have UTI with sepsis and acute on CKD. On 8/8 pt s/p PEA cardiac arrest likely 2/2 AHRF 2/2 aspiration. ROSC at 13mins, transferred to CCU on pressors. ICU dx: 1) Cardiac arrest with resultant anoxic encephalopathy 2) Acute respiratory failure with hypoxia and hypercapnia in setting of arrest 3) Shock state multifactorial; a) septic shock 2 to pseudomonal PNA b) post arrest myocardial dysfxn 4) Acute on CKD 2 to ATN. Current issues 1) MS not improving ( anoxic encephalopathy) 2) Leaking PEG site 3) Decreased Hb 6.8. Transferred to [facility] MICU for MRI w pacemaker compatibility for prognostication for post PEA arrest anoxic encepalopathy.

## 2024-08-19 ENCOUNTER — RESULT REVIEW (OUTPATIENT)
Age: 89
End: 2024-08-19

## 2024-08-19 NOTE — DIETITIAN INITIAL EVALUATION ADULT - PERTINENT MEDS FT
MEDICATIONS  (STANDING):  albuterol/ipratropium for Nebulization 3 milliLiter(s) Nebulizer every 6 hours  aspirin  chewable 81 milliGRAM(s) Oral daily  chlorhexidine 0.12% Liquid 15 milliLiter(s) Oral Mucosa two times a day  chlorhexidine 2% Cloths 1 Application(s) Topical daily  dextrose 5%. 1000 milliLiter(s) (50 mL/Hr) IV Continuous <Continuous>  dextrose 5%. 1000 milliLiter(s) (100 mL/Hr) IV Continuous <Continuous>  dextrose 50% Injectable 25 Gram(s) IV Push once  dextrose 50% Injectable 25 Gram(s) IV Push once  dextrose Oral Gel 15 Gram(s) Oral once  doxazosin 2 milliGRAM(s) Oral at bedtime  escitalopram 10 milliGRAM(s) Oral daily  glucagon  Injectable 1 milliGRAM(s) IntraMuscular once  heparin   Injectable 5000 Unit(s) SubCutaneous every 8 hours  insulin lispro (ADMELOG) corrective regimen sliding scale   SubCutaneous every 6 hours  insulin NPH human recombinant 4 Unit(s) SubCutaneous every 6 hours  levETIRAcetam  Solution 500 milliGRAM(s) Oral two times a day  midodrine 10 milliGRAM(s) Oral every 8 hours  modafinil 100 milliGRAM(s) Oral <User Schedule>  pantoprazole   Suspension 40 milliGRAM(s) Oral daily  petrolatum Ophthalmic Ointment 1 Application(s) Both EYES two times a day  polyethylene glycol 3350 17 Gram(s) Oral daily  senna Syrup 10 milliLiter(s) Oral at bedtime  sodium chloride 3%  Inhalation 4 milliLiter(s) Inhalation every 6 hours  ticagrelor 60 milliGRAM(s) Oral every 12 hours  valproic  acid Syrup 500 milliGRAM(s) Oral two times a day  zinc oxide 40% Paste 1 Application(s) Topical two times a day

## 2024-08-19 NOTE — PROGRESS NOTE ADULT - ASSESSMENT
Pt is a 89 yo M with h/o CAD, CABG, s/p stents, s/p PPM placement, HTN, HLD, DM, CKD, CVA x3, s/p PEG tube for dysphagia and acute cholecystitis s/p cholecystostomy ( not surgical candidate, s/p cholecystomy tube removal) initially presented/admitted on 8/7 2 to MS change; found to have UTI with sepsis and acute on CKD. On 8/8 pt s/p PEA cardiac arrest likely 2/2 AHRF 2/2 aspiration. ROSC at 13mins, transferred to CCU on pressors. ICU dx: 1) Cardiac arrest with resultant anoxic encephalopathy 2) Acute respiratory failure with hypoxia and hypercapnia in setting of arrest 3) Shock state multifactorial; a) septic shock 2 to pseudomonal PNA b) post arrest myocardial dysfxn 4) Acute on CKD 2 to ATN. Current issues 1) MS not improving ( anoxic encephalopathy) 2) Leaking PEG site 3) Decreased Hb 6.8. Transferred to [facility] MICU for MRI w pacemaker compatibility for prognostication for post PEA arrest anoxic encepalopathy.      NEURO:  #Mental status: AAOx0. Baseline AAOx1. Likely 2/2 anoxic encephalopathy .  - MRI for prognostication post PEA arrest   - continue Depakote 500mg BID, Keppra 500mg BID  - on aspirin, Brilinta, and statin for secondary stroke prevention.  - on modafinil 100mg BID as per family's request to stimulate mental status. Started on 8/17/24    CV:  #HD stable  - No pressor requirements   - Midodrine 10mg q8    #PEA arrest     #CHF EF 45%  - GDMT currently held    #HTN/HLD:   - Not on any home medications     # h/o ppm  - EP consult in AM for interrogation so patient can get MRI    PULM:  #Intubated 2/2 aspiration pna  - CPAP 5/5, triggering spontaneously  - Duonebs/Nebulized 3% NS for pulmonary toiletining   - Chest IPV     RENAL:  #MABLE:   -Trend I/Os and daily weights, and Cr. 1.5 --> 1.9    GI:  #PEG site leaking  - Resolved, no active leak  #Diet: PEG feeds resumed    - Pantpoprozole for GI prophylaxis   - Bowel reg w senna/miralax       ENDO:  #DM2: HbA1c 7.7%   - Insulin Sliding Scale  - Insulin Glargine 17U   - ISS    HEMATOLOGIC:  #Anemia of chronic disease  - s/p 1 unit PRBC 8/16   #Coag panel shows  #DVT prophylaxis with lovenox    ID:  #ASA PNA   - Sputum cx + pseudomonas, pansensitive   - continue extended infusion high dose zosyn 4.5g q8hrs 8/8-8/20   - Monitor for signs of infection     Psych:  #Depression  - Continue home medication lexapro     Ethics:  - Full code     Dispo:   - MICU, transfer back to [place] after MR     Pt is a 89 yo M with h/o CAD, CABG, s/p stents, s/p PPM placement, HTN, HLD, DM, CKD, CVA x3, s/p PEG tube for dysphagia and acute cholecystitis s/p cholecystostomy ( not surgical candidate, s/p cholecystomy tube removal) initially presented/admitted on 8/7 2 to MS change; found to have UTI with sepsis and acute on CKD s/p PEA cardiac arrest 8/8 likely 2/2 AHRF 2/2 aspiration s/p ROSC (after 13 mins), now transferred to [facility] for MRI Brain to assess prognostication iso concern for anoxic brain injury.      NEURO:  #Mental status: AAOx0. Baseline AAOx1. Likely 2/2 anoxic encephalopathy .  - MRI for prognostication post PEA arrest   - continue Depakote 500mg BID, Keppra 500mg BID  - on aspirin, Brilinta, and statin for secondary stroke prevention.  - on modafinil 100mg BID as per family's request to stimulate mental status. Started on 8/17/24    CV:  #HD stable  - No pressor requirements   - Midodrine 10mg q8    #PEA arrest     #CHF EF 45%  - GDMT currently held    #HTN/HLD:   - Not on any home medications     # h/o ppm  - EP consult in AM for interrogation so patient can get MRI    PULM:  #Intubated 2/2 aspiration pna  - CPAP 5/5, triggering spontaneously  - Duonebs/Nebulized 3% NS for pulmonary toiletining   - Chest IPV     RENAL:  #MABLE:   -Trend I/Os and daily weights, and Cr. 1.5 --> 1.9    GI:  #PEG site leaking  - Resolved, no active leak  #Diet: PEG feeds resumed    - Pantpoprozole for GI prophylaxis   - Bowel reg w senna/miralax       ENDO:  #DM2: HbA1c 7.7%   - Insulin Sliding Scale  - Insulin Glargine 17U   - ISS    HEMATOLOGIC:  #Anemia of chronic disease  - s/p 1 unit PRBC 8/16   #Coag panel shows  #DVT prophylaxis with lovenox    # RUE + RLE Swelling  - DVT study ordered     ID:  #ASA PNA   - Sputum cx + pseudomonas, pansensitive   - extended infusion high dose zosyn 4.5g q8hrs 8/8-8/19  - Zosyn stopped today   - BCx ordered today 8/19  - Monitor for signs of infection     Psych:  #Depression  - Continue home medication lexapro     Ethics:  - Full code     Dispo:   - MICU, transfer back to [place] after MR

## 2024-08-19 NOTE — DIETITIAN NUTRITION RISK NOTIFICATION - TREATMENT: THE FOLLOWING DIET HAS BEEN RECOMMENDED
Diet, NPO with Tube Feed:   Tube Feeding Modality: Gastrostomy  Glucerna 1.5 Prince (GLUCERNA1.5RTH)  Total Volume for 24 Hours (mL): 540  Continuous  Starting Tube Feed Rate {mL per Hour}: 10  Increase Tube Feed Rate by (mL): 10     Every 4 hours  Until Goal Tube Feed Rate (mL per Hour): 30  Tube Feed Duration (in Hours): 18  Tube Feed Start Time: 11:00  Tube Feed Stop Time: 05:00 (08-18-24 @ 18:02) [Active]

## 2024-08-19 NOTE — DIETITIAN INITIAL EVALUATION ADULT - OTHER INFO
Pt is intubated, ordered for trickle feeds, Glucerna 1.5 @30 mL/hr x 18 hrs, which is on hold at time of visit. Per chart, leaky PEG site now resolved. POCT remains >200 mg/dL, noted with high phos trends, today's value wnl. Previous admit weight (8/24) 163 lbs. Current admit wt 175.7 lbs. Fluid accumulation, 3+ edema and altered skin integrity, Sacrum stage 3 pressure injury, L Heel Unstageable. Pt is observed with moderate bi temporal muscle wasting.   Will suggest [employer] Glucose Support 1.2 at an increased rate to appropriately meet needs.

## 2024-08-19 NOTE — DIETITIAN INITIAL EVALUATION ADULT - ADD RECOMMEND
1. Hive7 Glucose Support 1.2  @15 mL/hr, increase as tolerated to reach the goal of 75 mL/hr X 18 HRS = 1350 mL, 1620 kcal, 86 gm pro.  2. Liquid Protein Supplement (LPS) 1 Packet 2x daily = 60 mL, 200 kcal, 30 gm pro. TOTAL DAILY PRO = 115 gm.   3. Recommend daily multivitamin supplement.

## 2024-08-19 NOTE — PROCEDURE NOTE - NSANATOMICLLOCATION_GEN_A_CORE
Anesthesia Type: 1% lidocaine with epinephrine Anesthesia Volume In Cc: 0 right/upper arm Consent: The patient's consent was obtained including but not limited to risks of crusting, scabbing, blistering, scarring, darker or lighter pigmentary change, recurrence, incomplete removal and infection. Detail Level: Zone Medical Necessity Information: It is in your best interest to select a reason for this procedure from the list below. All of these items fulfill various CMS LCD requirements except the new and changing color options. Include Z78.9 (Other Specified Conditions Influencing Health Status) As An Associated Diagnosis?: No Haskins: 0.1 Medical Necessity Clause: This procedure was medically necessary because the lesions that were treated were: Post-Care Instructions: I reviewed with the patient in detail post-care instructions. Patient is to wear sunprotection, and avoid picking at any of the treated lesions. Pt may apply Vaseline to crusted or scabbing areas

## 2024-08-19 NOTE — ADVANCED PRACTICE NURSE CONSULT - RECOMMEDATIONS
Recommending US to r/o DVT in L leg (unilateral swelling w/ unstageable injury to heel     Recommending imaging to r/o OM, fluid or gas collection to L heel    Recommending podiatry for full thickness wound to L heel    Topical Recommendations    Endotracheal tube: Inspect vulnerable skin/mucosa every 2 hours with repositioning of tube; provide mouth care with each tube repositioning. Change ETT saleh q3days or prn.     PEG: Cleanse q shift with NS, apply liquid barrier film beneath latesha disc.  If redness noted under latesha disc bumper apply thin foam  dressing without border (Mepilex Lite)- cut to mid dressing with a 'Y' shape.   Secondary securement to abdomen taping in 'H' fashion with Steri-strips.     Penile shaft: Cleanse with skin cleanser, pat dry. Apply critic aide moisture barrier ointment twice a day and PRN with incontinent episodes.     Sacrum to BL buttocks: Cleanse with NS, pat dry. Apply Liquid barrier film to periwound skin (allow to dry). Apply small silicone foam with borders over boniest prominence. Once adhered, apply TRIAD paste twice a day and PRN with incontinent episodes. With episodes of incontinence only remove soiled layer of Triad, then reinforce with thin layer. Change foam PRN if soiled.    L heel: Cleanse with NS, pat dry. Paint with betadine, allow to dry. Cover with abdominal pad, secure with kerlix, ensure within heel elevation boots at all times.    Continue low air loss bed therapy, continue heel elevation, continue to turn & reposition per protocol, soft pillow between bony prominences, continue moisture management with barrier creams & single breathable pad, continue measures to decrease friction/shear/pressure. Continue with nutritional support as per dietary/orders.    Plan of care discussed with primary RN at bedside, son at bedside with all questions answered, and primary MICU team.    Please contact Wound Care Service Line if we can be of further assistance (ext [phone]).

## 2024-08-19 NOTE — DIETITIAN INITIAL EVALUATION ADULT - NSFNSGIIOFT_GEN_A_CORE
08-18-24 @ 07:01  -  08-19-24 @ 07:00  --------------------------------------------------------  OUT:  Total OUT: 0 mL    Total NET: 100 mL

## 2024-08-19 NOTE — CHART NOTE - NSCHARTNOTEFT_GEN_A_CORE
Patient recently had MRI of the brain on 02/2024    Patient has   : [employer]  Model: Ale XT DR MRI [id number]  Mode: AAI -> DDD  Rate:  bpm

## 2024-08-19 NOTE — DIETITIAN INITIAL EVALUATION ADULT - NS FNS DIET ORDER
Diet, NPO with Tube Feed:   Tube Feeding Modality: Gastrostomy  Glucerna 1.5 Prince (GLUCERNA1.5RTH)  Total Volume for 24 Hours (mL): 540  Continuous  Starting Tube Feed Rate {mL per Hour}: 10  Increase Tube Feed Rate by (mL): 10     Every 4 hours  Until Goal Tube Feed Rate (mL per Hour): 30  Tube Feed Duration (in Hours): 18  Tube Feed Start Time: 11:00  Tube Feed Stop Time: 05:00 (08-18-24 @ 18:02)

## 2024-08-19 NOTE — PROGRESS NOTE ADULT - ASSESSMENT
SHAIK DONOHUE is a 88y M with a PMH CAD s/p CABG (1999), PCI stents x 5 (2019), PVD with stent place to Right Posterior Tibial Artery? (2021), T2DM, HTN, HLD, CKD, presented to [facility] on 2/26/22, found to have STEMI, now s/p GEMMA on ASA81 and Brilinta. Course c/b poor glycemic control, encephalopathy, Acute CHF.   Patient now admitted for a multidisciplinary rehab program. 03-05-22 @ 14:08    * Significant functional improvement    Rehab Management/MEDICAL MANAGEMENT    #Cardiopulmonary rehab s/p STEMI  - Gait Instability, ADL impairments and Functional impairments: start Comprehensive Rehab Program of PT/OT  - 3 hours a day, 5 days a week  - P&O as needed    #STEMI s/p GEMMA  - s/p GEMMA to mSVG->OM and POBA proxSVG->OM  - Continue ASA, Brilinta, atorvastatin, Hydralazine, Isordil, Lasix    #DM2  - Lantus 18 qhs + lispro 6 unit premeals  - Endo rec for discharge when done w/ rehab:  - Recommend for discharge Basaglar 20 units SQ qHS, Jardiance 25 mg daily and Tradjenta 5 mg daily    #Depressed  - Declined Psychology/psychiatry evaluation  - Agreeable to recreation therapy    #Sleep  - melatonin PRN    #Skin  - Skin on admission:  - Pressure injury/Skin: OOB to Chair, PT/OT    #Pain Mgmt  #DM Neuropathy  - Tylenol PRN  - gabapentin for DM neuropathy    #GI/Bowel Mgmt  - Continent c/w Senna, Miralax    #/Bladder Mgmt  -  PVR 8h,  CIC >400cc - low and dc    #FEN  - Diet - Regular + Thins  [CCHO, Kosher/Halal, Pescovegeterian]    #Precautions / PROPHYLAXIS:  - Falls, Cardiac  - ortho: Weight bearing status: WBAT  - Lungs: Aspiration, Incentive Spirometer  - DVT: Brilinta, hep subq    IDT   3/9/22  Ambulating >50FT with supervision  Tolerating oral diet  Upper body dressing with supervision, lower body dressing with min assistance  Est dc 3/12 or earlier. Patient expressed wish to go home tonight. Discussed with therapy team. Patient has reached rehab goals and is cleared from therapy standpoint to be discharged. Will discuss with patient's son regarding discharge today after therapy.    F/U Cardiology, PCP   SHAIK DONOHUE is a 88y M with a PMH CAD s/p CABG (1999), PCI stents x 5 (2019), PVD with stent place to Right Posterior Tibial Artery? (2021), T2DM, HTN, HLD, CKD, presented to [facility] on 2/26/22, found to have STEMI, now s/p GEMMA on ASA81 and Brilinta. Course c/b poor glycemic control, encephalopathy, Acute CHF.   Patient now admitted for a multidisciplinary rehab program. 03-05-22 @ 14:08    * Significant functional improvement, dc home today    Rehab Management/MEDICAL MANAGEMENT    #Cardiopulmonary rehab s/p STEMI  - Gait Instability, ADL impairments and Functional impairments: start Comprehensive Rehab Program of PT/OT  - 3 hours a day, 5 days a week  - P&O as needed    #STEMI s/p GEMMA  - s/p GEMMA to mSVG->OM and POBA proxSVG->OM  - Continue ASA, Brilinta, atorvastatin, Hydralazine, Isordil, Lasix    #DM2  - Lantus 18 qhs + lispro 6 unit premeals  - Endo rec for discharge when done w/ rehab:  - Recommend for discharge Basaglar 20 units SQ qHS, Jardiance 25 mg daily and Tradjenta 5 mg daily    #Depressed  - Declined Psychology/psychiatry evaluation  - Agreeable to recreation therapy    #Sleep  - melatonin PRN    #Skin  - Skin on admission:  - Pressure injury/Skin: OOB to Chair, PT/OT    #Pain Mgmt  #DM Neuropathy  - Tylenol PRN  - gabapentin for DM neuropathy    #GI/Bowel Mgmt  - Continent c/w Senna, Miralax    #/Bladder Mgmt  -  PVR 8h,  CIC >400cc - low and dc    #FEN  - Diet - Regular + Thins  [CCHO, Kosher/Halal, Pescovegeterian]    #Precautions / PROPHYLAXIS:  - Falls, Cardiac  - ortho: Weight bearing status: WBAT  - Lungs: Aspiration, Incentive Spirometer  - DVT: Brilinta, hep subq    IDT   3/9/22  Ambulating >50FT with supervision  Tolerating oral diet  Upper body dressing with supervision, lower body dressing with min assistance  Est dc 3/12 or earlier. Patient expressed wish to go home tonight. Discussed with therapy team. Patient has reached rehab goals and is cleared from therapy standpoint to be discharged. Will discuss with patient's son regarding discharge today after therapy.    F/U Cardiology, PCP Urine Pregnancy Test Result: negative Performed By: MURPHY Shaffer Detail Level: Zone

## 2024-08-19 NOTE — ADVANCED PRACTICE NURSE CONSULT - REASON FOR CONSULT
Patient seen on skin care rounds after wound care referral received for assessment of skin impairment and recommendations of topical management of the sacrum and penis. As per H&P, patient is a 89 yo M with h/o CAD, CABG, s/p stents, s/p PPM placement, HTN, HLD, DM, CKD, CVA x3, s/p PEG tube for dysphagia and acute cholecystitis s/p cholecystostomy ( not surgical candidate, s/p cholecystomy tube removal) initially presented/admitted on 8/7 2 to MS change; found to have UTI with sepsis and acute on CKD. On 8/8 pt s/p PEA cardiac arrest likely 2/2 AHRF 2/2 aspiration. ROSC at 13mins, transferred to CCU on pressors. ICU dx: 1) Cardiac arrest with resultant anoxic encephalopathy 2) Acute respiratory failure with hypoxia and hypercapnia in setting of arrest 3) Shock state multifactorial; a) septic shock 2 to pseudomonal PNA b) post arrest myocardial dysfxn 4) Acute on CKD 2 to ATN. Current issues 1) MS not improving ( anoxic encephalopathy) 2) Leaking PEG site 3) Decreased Hb 6.8. Transferred to [facility] MICU for MRI w pacemaker compatibility for prognostication for post PEA arrest anoxic encepalopathy.    Acute Skin Failure Markers: Cardiac arrest w/ PEA, Acute respiratory failure with hypoxia and hypercapnia, CKD w/ MABLE, sepsis with shock, chronic anemia, currently ventilated @24 hour nelida (>72 hour @ risk for acute skin failure).    Chart reviewed: WBC: 12.43, IANC: 10.18, H&H: 7.3/22.0, Platelets: 271, INR: 1.16, A1C: 7.7, BMI: 25.9, Reinaldo 9.

## 2024-08-19 NOTE — DIETITIAN INITIAL EVALUATION ADULT - PERTINENT LABORATORY DATA
08-19    137  |  100  |  61<H>  ----------------------------<  261<H>  3.4<L>   |  20<L>  |  1.75<H>    Ca    8.2<L>      19 Aug 2024 00:17  Phos  3.7     08-19  Mg     2.60     08-19    TPro  6.9  /  Alb  2.0<L>  /  TBili  0.3  /  DBili  x   /  AST  24  /  ALT  9   /  AlkPhos  62  08-19  POCT Blood Glucose.: 217 mg/dL (08-19-24 @ 05:14)  A1C with Estimated Average Glucose Result: 7.7 % (06-11-24 @ 04:04)  A1C with Estimated Average Glucose Result: 9.3 % (12-24-23 @ 06:53)  CAPILLARY BLOOD GLUCOSE  POCT Blood Glucose.: 217 mg/dL (19 Aug 2024 05:14)  POCT Blood Glucose.: 259 mg/dL (18 Aug 2024 23:40)  POCT Blood Glucose.: 274 mg/dL (18 Aug 2024 18:48)  POCT Blood Glucose.: 265 mg/dL (18 Aug 2024 11:54)

## 2024-08-19 NOTE — PROGRESS NOTE ADULT - ATTENDING COMMENTS
Patient is a 89 yo M w/ CAD s/p CABG s/p stents (last stent 5/2022), s/p PPM, HTN, HLD, T2DM, CKD, PVD, prior CVA x3, Myoclonic Jerks/Seizures, history of acute cholecystitis s/p cholecysteostomy who was initially admitted to [facility] on 8/7 after p/w AMS. Patient found to have UTI with sepsis and acute on chronic kidney failure with course c/b PEA arrest x 13 min likely due to AHRF 2/2 aspiration, now with concern for anoxic encephalopathy, transferred to [facility] for MRI brain for prognostication.     #Anoxic encephalopathy  #Post Cardiac Arrest  #MABLE on CKD  #Acute respiratory failure  #Fever  #Pseudomonas Pneumonia  - Will order and expedite MRI brain for prognostication, has PPM. Reportedly has had prior recent MRIs with said PPM  - Monitor off sedation, EEGs negative. NSE elevated to 46 but 4 days post arrest  - c/w mechanical ventilatory support, overbreathes vent  - WIll d/c Zosyn as completed 10 day course, low threshold to resume empiric abx  - Will repeat cultures given fevers and check RUE/RLE duplex. Also possible central fevers  - c/w airway clearance, add IPV  - Lasix PRN to maintain euvolemia, monitor BMP  - Monitor FSG, increase ISS to moderate    Neil Goode MD  Pulmonary & Critical Care

## 2024-08-19 NOTE — DIETITIAN INITIAL EVALUATION ADULT - ORAL INTAKE PTA/DIET HISTORY
Spoke with Pt's Son.  Pt on PEG feeds, Glucerna 1.5, 3-4 (237 mL) containers daily. Weight hx: ~175 - 180 lbs.

## 2024-08-19 NOTE — PROGRESS NOTE ADULT - SUBJECTIVE AND OBJECTIVE BOX
Epi Huntley MD  Internal Medicine, PGY3    MICU Progress Note    CHIEF COMPLAINT: SHAIK CHARANJIT is a 89 yo M with h/o CAD, CABG, s/p stents, s/p PPM placement, HTN, HLD, DM, CKD, CVA x3, s/p PEG tube for dysphagia and acute cholecystitis s/p cholecystostomy ( not surgical candidate, s/p cholecystomy tube removal) initially presented/admitted on 8/7 2 to MS change; found to have UTI with sepsis and acute on CKD. On 8/8 pt s/p PEA cardiac arrest likely 2/2 AHRF 2/2 aspiration. ROSC at 13mins, transferred to CCU on pressors. ICU dx: 1) Cardiac arrest with resultant anoxic encephalopathy 2) Acute respiratory failure with hypoxia and hypercapnia in setting of arrest 3) Shock state multifactorial; a) septic shock 2 to pseudomonal PNA b) post arrest myocardial dysfxn 4) Acute on CKD 2 to ATN. Current issues 1) MS not improving ( anoxic encephalopathy) 2) Leaking PEG site 3) Decreased Hb 6.8. Transferred to [facility] MICU for MRI w pacemaker compatibility for prognostication for post PEA arrest anoxic encepalopathy.    Interval Events:    Meds administered:  piperacillin/tazobactam IVPB..: 25 mL/Hr IV Intermittent (08-19 @ 05:23)  modafinil: 100 milliGRAM(s) Oral (08-19 @ 05:23)  midodrine: 10 milliGRAM(s) Oral (08-19 @ 05:23)  levETIRAcetam  Solution: 500 milliGRAM(s) Oral (08-19 @ 05:23)  zinc oxide 40% Paste: 1 Application(s) Topical (08-19 @ 05:22)  valproic  acid Syrup: 500 milliGRAM(s) Oral (08-19 @ 05:22)  ticagrelor: 60 milliGRAM(s) Oral (08-19 @ 05:22)  petrolatum Ophthalmic Ointment: 1 Application(s) Both EYES (08-19 @ 05:22)  chlorhexidine 0.12% Liquid: 15 milliLiter(s) Oral Mucosa (08-19 @ 05:21)  insulin lispro (ADMELOG) corrective regimen sliding scale: 2 Unit(s) SubCutaneous (08-19 @ 05:20)  potassium chloride  10 mEq/100 mL IVPB: 100 mL/Hr IV Intermittent (08-19 @ 03:41)  sodium chloride 3%  Inhalation: 4 milliLiter(s) Inhalation (08-19 @ 03:11)  albuterol/ipratropium for Nebulization: 3 milliLiter(s) Nebulizer (08-19 @ 03:11)  fentaNYL    Injectable: 50 MICROGram(s) IV Push (08-19 @ 03:10)  (ADM OVERRIDE): 1 Each &lt;See Task&gt; (08-19 @ 03:06)  potassium chloride  10 mEq/100 mL IVPB: 100 mL/Hr IV Intermittent (08-19 @ 02:44)  potassium chloride  10 mEq/100 mL IVPB: 100 mL/Hr IV Intermittent (08-19 @ 01:31)  insulin lispro (ADMELOG) corrective regimen sliding scale: 3 Unit(s) SubCutaneous (08-18 @ 23:44)  insulin NPH human recombinant: 4 Unit(s) SubCutaneous (08-18 @ 23:44)  furosemide   Injectable: 40 milliGRAM(s) IV Push (08-18 @ 23:18)  piperacillin/tazobactam IVPB..: 25 mL/Hr IV Intermittent (08-18 @ 21:47)  senna Syrup: 10 milliLiter(s) Oral (08-18 @ 21:46)  enoxaparin Injectable: 30 milliGRAM(s) SubCutaneous (08-18 @ 21:46)  doxazosin: 2 milliGRAM(s) Oral (08-18 @ 21:46)        OBJECTIVE:  Vitals:  T(F): 100 (08-19-24 @ 04:00), Max: 100 (08-19-24 @ 04:00)  HR: 86 (08-19-24 @ 07:09) (80 - 102)  BP: 111/55 (08-19-24 @ 06:00) (104/45 - 137/58)  BP(mean): 71 (08-19-24 @ 06:00) (60 - 88)  ABP: --  ABP(mean): --  RR: 19 (08-19-24 @ 06:00) (15 - 26)  SpO2: 95% (08-19-24 @ 07:09) (92% - 100%)  CVP(mm Hg): --  I/O Detail 24H    18 Aug 2024 07:01  -  19 Aug 2024 07:00  --------------------------------------------------------  IN:    Glucerna 1.5: 100 mL    IV PiggyBack: 500 mL  Total IN: 600 mL    OUT:    Indwelling Catheter - Urethral (mL): 1000 mL    Voided (mL): 200 mL  Total OUT: 1200 mL    Total NET: -600 mL          HOSPITAL MEDICATIONS:  Standing Meds:  albuterol/ipratropium for Nebulization 3 milliLiter(s) Nebulizer every 6 hours  aspirin  chewable 81 milliGRAM(s) Oral daily  chlorhexidine 0.12% Liquid 15 milliLiter(s) Oral Mucosa two times a day  chlorhexidine 2% Cloths 1 Application(s) Topical daily  dextrose 50% Injectable 25 Gram(s) IV Push once  doxazosin 2 milliGRAM(s) Oral at bedtime  enoxaparin Injectable 30 milliGRAM(s) SubCutaneous every 24 hours  escitalopram 10 milliGRAM(s) Oral daily  insulin lispro (ADMELOG) corrective regimen sliding scale   SubCutaneous every 6 hours  insulin NPH human recombinant 4 Unit(s) SubCutaneous every 6 hours  levETIRAcetam  Solution 500 milliGRAM(s) Oral two times a day  midodrine 10 milliGRAM(s) Oral every 8 hours  modafinil 100 milliGRAM(s) Oral <User Schedule>  pantoprazole   Suspension 40 milliGRAM(s) Oral daily  petrolatum Ophthalmic Ointment 1 Application(s) Both EYES two times a day  piperacillin/tazobactam IVPB.. 4.5 Gram(s) IV Intermittent every 8 hours  polyethylene glycol 3350 17 Gram(s) Oral daily  senna Syrup 10 milliLiter(s) Oral at bedtime  sodium chloride 3%  Inhalation 4 milliLiter(s) Inhalation every 6 hours  ticagrelor 60 milliGRAM(s) Oral every 12 hours  valproic  acid Syrup 500 milliGRAM(s) Oral two times a day  zinc oxide 40% Paste 1 Application(s) Topical two times a day      PRN Meds:      PHYSICAL EXAM:  GENERAL: NAD, lying in bed comfortably  HEAD:  Atraumatic, Normocephalic  EYES: EOMI, PERRLA, conjunctiva and sclera clear  ENT: Moist mucous membranes  NECK: Supple, No JVD  CHEST/LUNG: Clear to auscultation bilaterally; No rales, rhonchi, wheezing, or rubs. Unlabored respirations  HEART: Regular rate and rhythm; No murmurs, rubs, or gallops  ABDOMEN: Bowel sounds present; Soft, Nontender, Nondistended. No hepatomegaly  EXTREMITIES:  2+ Peripheral Pulses, brisk capillary refill. No clubbing, cyanosis, or edema  NERVOUS SYSTEM:  Alert & Oriented X3, speech clear. No deficits   MSK: FROM all 4 extremities, full and equal strength  SKIN: No rashes or lesions

## 2024-08-19 NOTE — ADVANCED PRACTICE NURSE CONSULT - ASSESSMENT
General: Intubated, ETT w/ Fio2 @ 30%. Patient is bedbound, incontinent of stool with indwelling delong catheter. Skin warm, dry with increased moisture in intertriginous folds, scattered areas of hyperpigmentation and hypopigmentation, scattered areas of ecchymosis on bilateral upper extremities with no hematomas. LUQ PEG clean dry and intact at this time; as per primary RN stated it was leaking, but was cleansed prior to assessment. Blanchable erythema on R heel.    Vascular: Bilateral lower extremities with scattered areas of hyper and hypopigmentation. Thin, shiny, taught skin. Thickened-yellow hyperpigmented overgrown toenails. Increased coolness from midfoot to distal toes. +2 Edema noted to LLE. Capillary refill >3 seconds. Unable to palpate DP/PT pulses, able to retrieve bilateral monophasic doppler sounds. Patient wiggling toes of L foot with movement during assessment.     L heel: Unstageable pressure injury, primary etiology pressure, secondary arterial compromise, measuring [id number] with 100% firmly attached brown/tan slough. Entire wound boggy with palpation. Periwound intact. No induration, no erythema, no crepitus, no edema, no temperature changes, no overt signs of infection noted. Goals of care: monitor for tissue type changes, prevent from pressure, friction, shearing, excess moisture.    Penile shaft: Superficially denuded mucosal membrane with irregularly bordered erythema, 2/2 moisture. No drainage, no odor. No induration, no crepitus, no fluctuance, no edema, no temperature changes, no overt signs of infection noted.     Sacrum to BL buttocks: Stage 2 pressure injury, primary etiology moisture, c/b friction and pressure; entire area measuring [id number] with 70% reepithelialization 20% pink dry dermis, and 10% yellow fibrin; minimally over burt prominence in natural anatomical laying position. Periwound intact. No induration, no erythema, no edema, no temperature changes, no overt signs of infection noted. Goals of care: Prevent from pressure, friction, shearing, excess moisture.

## 2024-08-20 NOTE — PROGRESS NOTE ADULT - ATTENDING COMMENTS
Patient is a 89 yo M w/ CAD s/p CABG s/p stents (last stent 5/2022), s/p PPM, HTN, HLD, T2DM, CKD, PVD, prior CVA x3, Myoclonic Jerks/Seizures, history of acute cholecystitis s/p cholecysteostomy who was initially admitted to [facility] on 8/7 after p/w AMS. Patient found to have UTI with sepsis and acute on chronic kidney failure with course c/b PEA arrest x 13 min likely due to AHRF 2/2 aspiration, now with concern for anoxic encephalopathy, transferred to [facility] for MRI brain for prognostication.     #Anoxic encephalopathy  #Post Cardiac Arrest  #MABLE on CKD  #Acute respiratory failure  #Fever  #Pseudomonas Pneumonia  - Ordered and expedited MRI brain for prognostication, has PPM. Reportedly has had prior recent MRIs with said PPM  - Monitor off sedation, EEGs negative. NSE elevated to 46 but 4 days post arrest  - c/w mechanical ventilatory support, overbreathes vent, tolerating PSV 5/5 today but copious secretions. c/w airway clearance  - d/dangelo Zosyn as completed 10 day course on 8/19, low threshold to resume empiric abx  - Will f/u repeat cultures given fevers. Also possible central fevers  - c/w airway clearance, IPV  - Lasix PRN to maintain euvolemia, monitor BMP. WIll dose Lasix 40mg IV x1 today  - Monitor FSG, increased ISS to moderate. Increase NPH  - f/u L foot xray  - Family requesting palliative consult    Neil Goode MD  Pulmonary & Critical Care

## 2024-08-20 NOTE — PROGRESS NOTE ADULT - SUBJECTIVE AND OBJECTIVE BOX
Epi Huntley MD  Internal Medicine, PGY3    MICU Progress Note    CHIEF COMPLAINT: SHAIK CHARANJIT is 89 yo M with h/o CAD, CABG, s/p stents, s/p PPM placement, HTN, HLD, DM, CKD, CVA x3, s/p PEG tube for dysphagia and acute cholecystitis s/p cholecystostomy ( not surgical candidate, s/p cholecystomy tube removal) initially presented/admitted on 8/7 2 to MS change; found to have UTI with sepsis and acute on CKD s/p PEA cardiac arrest 8/8 likely 2/2 AHRF 2/2 aspiration s/p ROSC (after 13 mins), now transferred to [facility] for MRI Brain to assess prognostication iso concern for anoxic brain injury.    Interval Events:    Meds administered:  modafinil: 100 milliGRAM(s) Oral (08-20 @ 05:51)  heparin   Injectable: 5000 Unit(s) SubCutaneous (08-20 @ 05:15)  insulin lispro (ADMELOG) corrective regimen sliding scale: 4 Unit(s) SubCutaneous (08-20 @ 05:12)  insulin NPH human recombinant: 4 Unit(s) SubCutaneous (08-20 @ 05:12)  zinc oxide 40% Paste: 1 Application(s) Topical (08-20 @ 05:11)  petrolatum Ophthalmic Ointment: 1 Application(s) Both EYES (08-20 @ 05:11)  midodrine: 10 milliGRAM(s) Oral (08-20 @ 05:11)  valproic  acid Syrup: 500 milliGRAM(s) Oral (08-20 @ 05:10)  ticagrelor: 60 milliGRAM(s) Oral (08-20 @ 05:10)  potassium chloride   Powder: 40 milliEquivalent(s) Oral (08-20 @ 05:10)  levETIRAcetam  Solution: 500 milliGRAM(s) Oral (08-20 @ 05:10)  chlorhexidine 0.12% Liquid: 15 milliLiter(s) Oral Mucosa (08-20 @ 05:10)  sodium chloride 3%  Inhalation: 4 milliLiter(s) Inhalation (08-20 @ 03:03)  albuterol/ipratropium for Nebulization: 3 milliLiter(s) Nebulizer (08-20 @ 03:03)  insulin lispro (ADMELOG) corrective regimen sliding scale: 4 Unit(s) SubCutaneous (08-19 @ 23:58)  insulin NPH human recombinant: 4 Unit(s) SubCutaneous (08-19 @ 23:57)  doxazosin: 2 milliGRAM(s) Oral (08-19 @ 21:31)  heparin   Injectable: 5000 Unit(s) SubCutaneous (08-19 @ 21:27)  senna Syrup: 10 milliLiter(s) Oral (08-19 @ 21:26)  midodrine: 10 milliGRAM(s) Oral (08-19 @ 21:26)        OBJECTIVE:  Vitals:  T(F): 100 (08-20-24 @ 04:00), Max: 100.8 (08-19-24 @ 12:00)  HR: 101 (08-20-24 @ 06:00) (83 - 101)  BP: 112/54 (08-20-24 @ 06:00) (99/34 - 131/51)  BP(mean): 71 (08-20-24 @ 06:00) (53 - 75)  ABP: --  ABP(mean): --  RR: 13 (08-20-24 @ 06:00) (13 - 25)  SpO2: 98% (08-20-24 @ 06:00) (90% - 98%)  CVP(mm Hg): --  I/O Detail 24H    19 Aug 2024 07:01  -  20 Aug 2024 07:00  --------------------------------------------------------  IN:    Glucerna 1.5: 540 mL    IV PiggyBack: 200 mL  Total IN: 740 mL    OUT:    Indwelling Catheter - Urethral (mL): 890 mL  Total OUT: 890 mL    Total NET: -150 mL          HOSPITAL MEDICATIONS:  Standing Meds:  albuterol/ipratropium for Nebulization 3 milliLiter(s) Nebulizer every 6 hours  aspirin  chewable 81 milliGRAM(s) Oral daily  chlorhexidine 0.12% Liquid 15 milliLiter(s) Oral Mucosa two times a day  chlorhexidine 2% Cloths 1 Application(s) Topical daily  dextrose 5%. 1000 milliLiter(s) IV Continuous <Continuous>  dextrose 5%. 1000 milliLiter(s) IV Continuous <Continuous>  dextrose 50% Injectable 25 Gram(s) IV Push once  dextrose 50% Injectable 25 Gram(s) IV Push once  dextrose Oral Gel 15 Gram(s) Oral once  doxazosin 2 milliGRAM(s) Oral at bedtime  escitalopram 10 milliGRAM(s) Oral daily  glucagon  Injectable 1 milliGRAM(s) IntraMuscular once  heparin   Injectable 5000 Unit(s) SubCutaneous every 8 hours  insulin lispro (ADMELOG) corrective regimen sliding scale   SubCutaneous every 6 hours  insulin NPH human recombinant 4 Unit(s) SubCutaneous every 6 hours  levETIRAcetam  Solution 500 milliGRAM(s) Oral two times a day  midodrine 10 milliGRAM(s) Oral every 8 hours  modafinil 100 milliGRAM(s) Oral <User Schedule>  pantoprazole   Suspension 40 milliGRAM(s) Oral daily  petrolatum Ophthalmic Ointment 1 Application(s) Both EYES two times a day  polyethylene glycol 3350 17 Gram(s) Oral daily  senna Syrup 10 milliLiter(s) Oral at bedtime  sodium chloride 3%  Inhalation 4 milliLiter(s) Inhalation every 6 hours  ticagrelor 60 milliGRAM(s) Oral every 12 hours  valproic  acid Syrup 500 milliGRAM(s) Oral two times a day  zinc oxide 40% Paste 1 Application(s) Topical two times a day      PRN Meds:      PHYSICAL EXAM:  GENERAL: NAD, lying in bed comfortably  HEAD:  Atraumatic, Normocephalic  EYES: EOMI, PERRLA, conjunctiva and sclera clear  ENT: Moist mucous membranes  NECK: Supple, No JVD  CHEST/LUNG: Clear to auscultation bilaterally; No rales, rhonchi, wheezing, or rubs. Unlabored respirations  HEART: Regular rate and rhythm; No murmurs, rubs, or gallops  ABDOMEN: Bowel sounds present; Soft, Nontender, Nondistended. No hepatomegaly  EXTREMITIES:  2+ Peripheral Pulses, brisk capillary refill. No clubbing, cyanosis, or edema  NERVOUS SYSTEM:  Alert & Oriented X3, speech clear. No deficits   MSK: FROM all 4 extremities, full and equal strength  SKIN: No rashes or lesions   Epi Huntley MD  Internal Medicine, PGY3    MICU Progress Note    CHIEF COMPLAINT: SHAIK CHARANJIT is 89 yo M with h/o CAD, CABG, s/p stents, s/p PPM placement, HTN, HLD, DM, CKD, CVA x3, s/p PEG tube for dysphagia and acute cholecystitis s/p cholecystostomy ( not surgical candidate, s/p cholecystomy tube removal) initially presented/admitted on 8/7 2 to MS change; found to have UTI with sepsis and acute on CKD s/p PEA cardiac arrest 8/8 likely 2/2 AHRF 2/2 aspiration s/p ROSC (after 13 mins), now transferred to [facility] for MRI Brain to assess prognostication iso concern for anoxic brain injury.    Interval Events:  no acute events overnight    Meds administered:  modafinil: 100 milliGRAM(s) Oral (08-20 @ 05:51)  heparin   Injectable: 5000 Unit(s) SubCutaneous (08-20 @ 05:15)  insulin lispro (ADMELOG) corrective regimen sliding scale: 4 Unit(s) SubCutaneous (08-20 @ 05:12)  insulin NPH human recombinant: 4 Unit(s) SubCutaneous (08-20 @ 05:12)  zinc oxide 40% Paste: 1 Application(s) Topical (08-20 @ 05:11)  petrolatum Ophthalmic Ointment: 1 Application(s) Both EYES (08-20 @ 05:11)  midodrine: 10 milliGRAM(s) Oral (08-20 @ 05:11)  valproic  acid Syrup: 500 milliGRAM(s) Oral (08-20 @ 05:10)  ticagrelor: 60 milliGRAM(s) Oral (08-20 @ 05:10)  potassium chloride   Powder: 40 milliEquivalent(s) Oral (08-20 @ 05:10)  levETIRAcetam  Solution: 500 milliGRAM(s) Oral (08-20 @ 05:10)  chlorhexidine 0.12% Liquid: 15 milliLiter(s) Oral Mucosa (08-20 @ 05:10)  sodium chloride 3%  Inhalation: 4 milliLiter(s) Inhalation (08-20 @ 03:03)  albuterol/ipratropium for Nebulization: 3 milliLiter(s) Nebulizer (08-20 @ 03:03)  insulin lispro (ADMELOG) corrective regimen sliding scale: 4 Unit(s) SubCutaneous (08-19 @ 23:58)  insulin NPH human recombinant: 4 Unit(s) SubCutaneous (08-19 @ 23:57)  doxazosin: 2 milliGRAM(s) Oral (08-19 @ 21:31)  heparin   Injectable: 5000 Unit(s) SubCutaneous (08-19 @ 21:27)  senna Syrup: 10 milliLiter(s) Oral (08-19 @ 21:26)  midodrine: 10 milliGRAM(s) Oral (08-19 @ 21:26)        OBJECTIVE:  Vitals:  T(F): 100 (08-20-24 @ 04:00), Max: 100.8 (08-19-24 @ 12:00)  HR: 101 (08-20-24 @ 06:00) (83 - 101)  BP: 112/54 (08-20-24 @ 06:00) (99/34 - 131/51)  BP(mean): 71 (08-20-24 @ 06:00) (53 - 75)  ABP: --  ABP(mean): --  RR: 13 (08-20-24 @ 06:00) (13 - 25)  SpO2: 98% (08-20-24 @ 06:00) (90% - 98%)  CVP(mm Hg): --  I/O Detail 24H    19 Aug 2024 07:01  -  20 Aug 2024 07:00  --------------------------------------------------------  IN:    Glucerna 1.5: 540 mL    IV PiggyBack: 200 mL  Total IN: 740 mL    OUT:    Indwelling Catheter - Urethral (mL): 890 mL  Total OUT: 890 mL    Total NET: -150 mL          HOSPITAL MEDICATIONS:  Standing Meds:  albuterol/ipratropium for Nebulization 3 milliLiter(s) Nebulizer every 6 hours  aspirin  chewable 81 milliGRAM(s) Oral daily  chlorhexidine 0.12% Liquid 15 milliLiter(s) Oral Mucosa two times a day  chlorhexidine 2% Cloths 1 Application(s) Topical daily  dextrose 5%. 1000 milliLiter(s) IV Continuous <Continuous>  dextrose 5%. 1000 milliLiter(s) IV Continuous <Continuous>  dextrose 50% Injectable 25 Gram(s) IV Push once  dextrose 50% Injectable 25 Gram(s) IV Push once  dextrose Oral Gel 15 Gram(s) Oral once  doxazosin 2 milliGRAM(s) Oral at bedtime  escitalopram 10 milliGRAM(s) Oral daily  glucagon  Injectable 1 milliGRAM(s) IntraMuscular once  heparin   Injectable 5000 Unit(s) SubCutaneous every 8 hours  insulin lispro (ADMELOG) corrective regimen sliding scale   SubCutaneous every 6 hours  insulin NPH human recombinant 4 Unit(s) SubCutaneous every 6 hours  levETIRAcetam  Solution 500 milliGRAM(s) Oral two times a day  midodrine 10 milliGRAM(s) Oral every 8 hours  modafinil 100 milliGRAM(s) Oral <User Schedule>  pantoprazole   Suspension 40 milliGRAM(s) Oral daily  petrolatum Ophthalmic Ointment 1 Application(s) Both EYES two times a day  polyethylene glycol 3350 17 Gram(s) Oral daily  senna Syrup 10 milliLiter(s) Oral at bedtime  sodium chloride 3%  Inhalation 4 milliLiter(s) Inhalation every 6 hours  ticagrelor 60 milliGRAM(s) Oral every 12 hours  valproic  acid Syrup 500 milliGRAM(s) Oral two times a day  zinc oxide 40% Paste 1 Application(s) Topical two times a day      PRN Meds:      PHYSICAL EXAM:  GENERAL: intubated, in no acute distress  HEAD:  Atraumatic, Normocephalic  EYES: EOMI, PERRLA, conjunctiva and sclera clear  ENT: Moist mucous membranes  NECK: Supple, No JVD  CHEST/LUNG: Clear to auscultation bilaterally; No rales, rhonchi, wheezing, or rubs. Unlabored respirations  HEART: Regular rate and rhythm; No murmurs, rubs, or gallops  ABDOMEN: Bowel sounds present; Soft, Nontender, Nondistended. No hepatomegaly  EXTREMITIES:  2+ Peripheral Pulses, brisk capillary refill. No clubbing, cyanosis, or edema  NERVOUS SYSTEM:  Alert; does not follow commands; withdraws to pain slightly  MSK: equal muscle bulk  SKIN: No rashes or lesions

## 2024-08-20 NOTE — PROGRESS NOTE ADULT - ASSESSMENT
Pt is a 89 yo M with h/o CAD, CABG, s/p stents, s/p PPM placement, HTN, HLD, DM, CKD, CVA x3, s/p PEG tube for dysphagia and acute cholecystitis s/p cholecystostomy ( not surgical candidate, s/p cholecystomy tube removal) initially presented/admitted on 8/7 2 to MS change; found to have UTI with sepsis and acute on CKD s/p PEA cardiac arrest 8/8 likely 2/2 AHRF 2/2 aspiration s/p ROSC (after 13 mins), now transferred to [facility] for MRI Brain to assess prognostication iso concern for anoxic brain injury.      NEURO:  #Mental status: AAOx0. Baseline AAOx1. Likely 2/2 anoxic encephalopathy .  - MRI for prognostication post PEA arrest   - continue Depakote 500mg BID, Keppra 500mg BID  - on aspirin, Brilinta, and statin for secondary stroke prevention.  - on modafinil 100mg BID as per family's request to stimulate mental status. Started on 8/17/24    CV:  #HD stable  - No pressor requirements   - Midodrine 10mg q8    #PEA arrest     #CHF EF 45%  - GDMT currently held    #HTN/HLD:   - Not on any home medications     # h/o ppm  - EP consult in AM for interrogation so patient can get MRI    PULM:  #Intubated 2/2 aspiration pna  - CPAP 5/5, triggering spontaneously  - Duonebs/Nebulized 3% NS for pulmonary toiletining   - Chest IPV     RENAL:  #MABLE:   -Trend I/Os and daily weights, and Cr. 1.5 --> 1.9    GI:  #PEG site leaking  - Resolved, no active leak  #Diet: PEG feeds resumed    - Pantpoprozole for GI prophylaxis   - Bowel reg w senna/miralax       ENDO:  #DM2: HbA1c 7.7%   - Insulin Sliding Scale  - Insulin Glargine 17U   - ISS    HEMATOLOGIC:  #Anemia of chronic disease  - s/p 1 unit PRBC 8/16   #Coag panel shows  #DVT prophylaxis with lovenox    # RUE + RLE Swelling  - DVT study ordered     ID:  #ASA PNA   - Sputum cx + pseudomonas, pansensitive   - extended infusion high dose zosyn 4.5g q8hrs 8/8-8/19  - Zosyn stopped today   - BCx ordered today 8/19  - Monitor for signs of infection     Psych:  #Depression  - Continue home medication lexapro     Ethics:  - Full code     Dispo:   - MICU, transfer back to [place] after MR   Pt is a 89 yo M with h/o CAD, CABG, s/p stents, s/p PPM placement, HTN, HLD, DM, CKD, CVA x3, s/p PEG tube for dysphagia and acute cholecystitis s/p cholecystostomy ( not surgical candidate, s/p cholecystomy tube removal) initially presented/admitted on 8/7 2 to MS change; found to have UTI with sepsis and acute on CKD s/p PEA cardiac arrest 8/8 likely 2/2 AHRF 2/2 aspiration s/p ROSC (after 13 mins), now transferred to [facility] for MRI Brain to assess prognostication iso concern for anoxic brain injury.      NEURO:  #Mental status: AAOx0. Baseline AAOx1. Likely 2/2 anoxic encephalopathy .  - MRI for prognostication post PEA arrest   - continue Depakote 500mg BID, Keppra 500mg BID  - on aspirin, Brilinta, and statin for secondary stroke prevention.  - on modafinil 100mg BID as per family's request to stimulate mental status. Started on 8/17/24    CV:  #HD stable  - No pressor requirements   - Midodrine 10mg q8  - plan for Lasix 40mg IV x1 8/20 for anasarca    #PEA arrest     #CHF EF 45%  - GDMT currently held    #HTN/HLD:   - Not on any home medications     # h/o ppm  - EP consult in AM for interrogation so patient can get MRI    PULM:  #Intubated 2/2 aspiration pna  - CPAP 5/5, triggering spontaneously  - Duonebs/Nebulized 3% NS for pulmonary toiletining   - Chest IPV     RENAL:  #MABLE:   -Trend I/Os and daily weights, and Cr. 1.5 --> 1.9    GI:  #PEG site leaking  - Resolved, no active leak  #Diet: PEG feeds resumed    - Pantpoprozole for GI prophylaxis   - Bowel reg w senna/miralax       ENDO:  #DM2: HbA1c 7.7%   - Insulin Sliding Scale  - Insulin Glargine 17U   - ISS    HEMATOLOGIC:  #Anemia of chronic disease  - s/p 1 unit PRBC 8/16   #Coag panel shows  #DVT prophylaxis with lovenox    # RUE + RLE Swelling  - DVT study ordered     ID:  #ASA PNA   - Sputum cx + pseudomonas, pansensitive   - extended infusion high dose zosyn 4.5g q8hrs 8/8-8/19  - Zosyn stopped today   - BCx ordered today 8/19  - Monitor for signs of infection     Psych:  #Depression  - Continue home medication lexapro     Ethics:  - Full code     Dispo:   - MICU, transfer back to [place] after MR

## 2024-08-20 NOTE — PROCEDURE NOTE - ADDITIONAL PROCEDURE DETAILS
Appropriate pacing and sensing. Capture threshold tested via iterative testing. Had episodes of AT/AF since 2/21/2024 lasting up to ~3 hrs. Not on AC. Last episode on 8/16/24 lasting 37 secs. Notified MICU team. No reprogramming done.  PPM is MRI conditional. MRI cardiology order form placed in the chart
US guided Power-Glide IV catheter

## 2024-08-21 NOTE — CONSULT NOTE ADULT - PROBLEM SELECTOR RECOMMENDATION 8
In the event of newly developing, evolving, or worsening symptoms, please contact the Palliative Medicine team via pager (if the patient is at [facility] #[phone] or if the patient is at [facility] #[phone]) The Geriatric and Palliative Medicine service has coverage 24 hours a day/ 7 days a week to provide medical recommendations regarding symptom management needs via telephone.

## 2024-08-21 NOTE — PROGRESS NOTE ADULT - ASSESSMENT
Pt is a 89 yo M with h/o CAD, CABG, s/p stents, s/p PPM placement, HTN, HLD, DM, CKD, CVA x3, s/p PEG tube for dysphagia and acute cholecystitis s/p cholecystostomy ( not surgical candidate, s/p cholecystomy tube removal) initially presented/admitted on 8/7 2 to MS change; found to have UTI with sepsis and acute on CKD s/p PEA cardiac arrest 8/8 likely 2/2 AHRF 2/2 aspiration s/p ROSC (after 13 mins), now transferred to [facility] for MRI Brain to assess prognostication iso concern for anoxic brain injury.      NEURO:  #Mental status: AAOx0. Baseline AAOx1. Likely 2/2 anoxic encephalopathy .  - MRI for prognostication post PEA arrest   - continue Depakote 500mg BID, Keppra 500mg BID  - on aspirin, Brilinta, and statin for secondary stroke prevention.  - on modafinil 100mg BID as per family's request to stimulate mental status. Started on 8/17/24    CV:  #HD stable  - No pressor requirements   - Midodrine 10mg q8  - plan for Lasix 40mg IV x1 8/20 for anasarca    #PEA arrest     #CHF EF 45%  - GDMT currently held    #HTN/HLD:   - Not on any home medications     # h/o ppm  - EP consult in AM for interrogation so patient can get MRI    PULM:  #Intubated 2/2 aspiration pna  - CPAP 5/5, triggering spontaneously  - Duonebs/Nebulized 3% NS for pulmonary toiletining   - Chest IPV     RENAL:  #MABLE:   -Trend I/Os and daily weights, and Cr. 1.5 --> 1.9    GI:  #PEG site leaking  - Resolved, no active leak  #Diet: PEG feeds resumed    - Pantpoprozole for GI prophylaxis   - Bowel reg w senna/miralax       ENDO:  #DM2: HbA1c 7.7%   - Insulin Sliding Scale  - Insulin Glargine 17U   - ISS    HEMATOLOGIC:  #Anemia of chronic disease  - s/p 1 unit PRBC 8/16   #Coag panel shows  #DVT prophylaxis with lovenox    # RUE + RLE Swelling  - DVT study ordered     ID:  #ASA PNA   - Sputum cx + pseudomonas, pansensitive   - extended infusion high dose zosyn 4.5g q8hrs 8/8-8/19  - Zosyn stopped today   - BCx ordered today 8/19  - Monitor for signs of infection     Psych:  #Depression  - Continue home medication lexapro     Ethics:  - Full code     Dispo:   - MICU, transfer back to [place] after MR   Pt is a 89 yo M with h/o CAD, CABG, s/p stents, s/p PPM placement, HTN, HLD, DM, CKD, CVA x3, s/p PEG tube for dysphagia and acute cholecystitis s/p cholecystostomy ( not surgical candidate, s/p cholecystomy tube removal) initially presented/admitted on 8/7 2 to MS change; found to have UTI with sepsis and acute on CKD s/p PEA cardiac arrest 8/8 likely 2/2 AHRF 2/2 aspiration s/p ROSC (after 13 mins), now transferred to [facility] for MRI Brain to assess prognostication iso concern for anoxic brain injury.      NEURO:  #Mental status: AAOx0. Baseline AAOx1. Likely 2/2 anoxic encephalopathy .  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  - on modafinil 100mg BID as per family's request to stimulate mental status. Started on 8/17/24    CV:  #HD stable  - No pressor requirements   - Midodrine 10mg q8  - plan for Lasix 40mg IV x1 8/20 for anasarca  - plan for Lasix 40mg IV x1 8/21 for anasarca    #PEA arrest   - due to aspiration 8/7    #CHF EF 45%  - GDMT currently held    #HTN/HLD:   - Not on any home medications     # h/o ppm  - EP consult in AM for interrogation so patient can get MRI    PULM:  #Intubated 2/2 aspiration pna  - CPAP 5/5, triggering spontaneously  - Duonebs/Nebulized 3% NS for pulmonary toiletining   - Chest IPV     RENAL:  #MABLE:   -Trend I/Os and daily weights, and Cr. 1.5 --> 1.9    GI:  #PEG site leaking  - Resolved, no active leak  #Diet: PEG feeds resumed    - Pantpoprozole for GI prophylaxis   - Bowel reg w senna/miralax       ENDO:  #DM2: HbA1c 7.7%   - Insulin Sliding Scale  - Insulin Glargine 17U   - ISS    HEMATOLOGIC:  #Anemia of chronic disease  - s/p 1 unit PRBC 8/16   #Coag panel shows  #DVT prophylaxis with lovenox    # RUE + RLE Swelling  - DVT study ordered     ID:  #ASA PNA   - Sputum cx + pseudomonas, pansensitive   - extended infusion high dose zosyn 4.5g q8hrs 8/8-8/19  - Zosyn stopped 8/19  - BCx ordered today 8/19  - Monitor for signs of infection     Psych:  #Depression  - Continue home medication lexapro     Ethics:  - DNR/intubate  - family discussing about palliative extubation and transition to comfort care    Dispo:   - MICU  - family discussing about palliative extubation and transition to comfort care

## 2024-08-21 NOTE — CONSULT NOTE ADULT - ASSESSMENT
89 yo M with h/o CAD, CABG, s/p stents, s/p PPM placement, HTN, HLD, DM, CKD, CVA x3, s/p PEG tube for dysphagia and acute cholecystitis s/p cholecystostomy ( not surgical candidate, s/p cholecystomy tube removal) initially presented/admitted on 8/7 2 to MS change; found to have UTI with sepsis and acute on CKD s/p PEA cardiac arrest 8/8 likely 2/2 AHRF 2/2 aspiration s/p ROSC (after 13 mins), now transferred to [facility] for MRI Brain to assess prognostication iso concern for anoxic brain injury.   89 yo M with h/o CAD, CABG, s/p stents, s/p PPM placement, HTN, HLD, DM, CKD, CVA x3, s/p PEG tube for dysphagia and acute cholecystitis s/p cholecystostomy ( not surgical candidate, s/p cholecystomy tube removal) initially presented/admitted on 8/7 2 to MS change; found to have UTI with sepsis and acute on CKD s/p PEA cardiac arrest 8/8 likely 2/2 AHRF 2/2 aspiration s/p ROSC (after 13 mins), now transferred to [facility] for MRI Brain to assess prognostication iso concern for anoxic brain injury.  Palliative Care consulted for complex decision making  related to goals of care discussions

## 2024-08-21 NOTE — CONSULT NOTE ADULT - PROBLEM SELECTOR RECOMMENDATION 7
Family (4 sons, 1 daughter) requested palliative care consultation. Family is aware of patient's poor prognosis iso imaging consistent with anoxic brain injury. Family was interested in learning details about palliative care options, including comfort care and hospice. Family requested patient be made DNR with tentative plans for palliative extubation in the near future. For now, family is deferring "comfort care" measures, and want to continue blood draws and treatment. Family also mentioned interest in continuing feeds through the PEG tube for now.  - maintain feeds  - change code status for DNR  - tentative plan for palliative extubation pending date/time  - continue goals of care conversations, patient considering comfort care following extubation

## 2024-08-21 NOTE — CONSULT NOTE ADULT - PROBLEM SELECTOR RECOMMENDATION 2
MRI with anoxic brain injury following cardiac arrest  - management per MICU team - MRI with anoxic brain injury following cardiac arrest  - MRI Brain 8/20 - Cortical restricted diffusion and patchy enhancement in the bilateral posterior frontal, parietal and occipital lobes compatible with hypoxic ischemic encephalopathy.  - Neurology recommendations appreciated - " poor overall prognosis of meaningful recovery"  - management per MICU team

## 2024-08-21 NOTE — CONSULT NOTE ADULT - PROBLEM SELECTOR RECOMMENDATION 9
S/p cardiac arrest, possibly secondary to acute hypoxic respiratory failure following aspiration event. Subsequent MRI with anoxic brain injury.   - neuro prognostication per primary team  - vent management per primary team, potential plan for palliative extubation  - change code status to DNR  - continue goals of care conversations - S/p cardiac arrest, possibly secondary to acute hypoxic respiratory failure following aspiration event. Subsequent MRI with anoxic brain injury.

## 2024-08-21 NOTE — PROGRESS NOTE ADULT - ATTENDING COMMENTS
Patient is a 89 yo M w/ CAD s/p CABG s/p stents (last stent 5/2022), s/p PPM, HTN, HLD, T2DM, CKD, PVD, prior CVA x3, Myoclonic Jerks/Seizures, history of acute cholecystitis s/p cholecysteostomy who was initially admitted to [facility] on 8/7 after p/w AMS. Patient found to have UTI with sepsis and acute on chronic kidney failure with course c/b PEA arrest x 13 min likely due to AHRF 2/2 aspiration, now with concern for anoxic encephalopathy, transferred to [facility] for MRI brain for prognostication.     #Anoxic encephalopathy  #Post Cardiac Arrest  #MABLE on CKD  #Acute respiratory failure  #Fever  #Pseudomonas Pneumonia  - MRI brain consistent with Hypoxemic ischemic encephalopathy  - Monitor off sedation, EEGs negative. NSE elevated to 46 but 4 days post arrest  - c/w mechanical ventilatory support, overbreathes vent, tolerating PSV 5/5 today but still with secretions. c/w airway clearance  - d/dangelo Zosyn as completed 10 day course on 8/19, low threshold to resume empiric abx  - Will f/u repeat cultures given fevers. Also possible central fevers  - c/w airway clearance, IPV  - Lasix PRN to maintain euvolemia, monitor BMP. WIll dose additional Lasix 40mg IV x1 today  - f/u L foot xray  - f/u palliative consult  - c/w NPH, monitor FSG    Neil Goode MD  Pulmonary & Critical Care

## 2024-08-21 NOTE — PROGRESS NOTE ADULT - SUBJECTIVE AND OBJECTIVE BOX
pEi Huntley MD  Internal Medicine, PGY3    MICU Progress Note    CHIEF COMPLAINT: SHAIK CHARANJIT is a 89 yo M with h/o CAD, CABG, s/p stents, s/p PPM placement, HTN, HLD, DM, CKD, CVA x3, s/p PEG tube for dysphagia and acute cholecystitis s/p cholecystostomy ( not surgical candidate, s/p cholecystomy tube removal) initially presented/admitted on 8/7 2 to MS change; found to have UTI with sepsis and acute on CKD s/p PEA cardiac arrest 8/8 likely 2/2 AHRF 2/2 aspiration s/p ROSC (after 13 mins), now transferred to [facility] for MRI Brain to assess prognostication iso concern for anoxic brain injury.    Interval Events:    Meds administered:  ticagrelor: 60 milliGRAM(s) Oral (08-21 @ 06:21)  insulin lispro (ADMELOG) corrective regimen sliding scale: 2 Unit(s) SubCutaneous (08-21 @ 05:33)  modafinil: 100 milliGRAM(s) Oral (08-21 @ 05:26)  zinc oxide 40% Paste: 1 Application(s) Topical (08-21 @ 05:21)  valproic  acid Syrup: 500 milliGRAM(s) Oral (08-21 @ 05:21)  petrolatum Ophthalmic Ointment: 1 Application(s) Both EYES (08-21 @ 05:21)  levETIRAcetam  Solution: 500 milliGRAM(s) Oral (08-21 @ 05:21)  heparin   Injectable: 5000 Unit(s) SubCutaneous (08-21 @ 05:21)  chlorhexidine 0.12% Liquid: 15 milliLiter(s) Oral Mucosa (08-21 @ 05:21)  sodium chloride 3%  Inhalation: 4 milliLiter(s) Inhalation (08-21 @ 03:36)  albuterol/ipratropium for Nebulization: 3 milliLiter(s) Nebulizer (08-21 @ 03:36)  insulin NPH human recombinant: 7 Unit(s) SubCutaneous (08-21 @ 00:25)  sodium chloride 3%  Inhalation: 4 milliLiter(s) Inhalation (08-20 @ 22:59)  albuterol/ipratropium for Nebulization: 3 milliLiter(s) Nebulizer (08-20 @ 22:59)  midodrine: 10 milliGRAM(s) Oral (08-20 @ 21:05)  heparin   Injectable: 5000 Unit(s) SubCutaneous (08-20 @ 21:05)  doxazosin: 2 milliGRAM(s) Oral (08-20 @ 21:05)        OBJECTIVE:  Vitals:  T(F): 100 (08-21-24 @ 04:00), Max: 100.4 (08-20-24 @ 12:00)  HR: 92 (08-21-24 @ 06:25) (89 - 124)  BP: 117/50 (08-21-24 @ 06:00) (108/56 - 127/60)  BP(mean): 68 (08-21-24 @ 06:00) (64 - 88)  ABP: --  ABP(mean): --  RR: 19 (08-21-24 @ 06:00) (15 - 25)  SpO2: 98% (08-21-24 @ 06:25) (97% - 100%)  CVP(mm Hg): --  I/O Detail 24H    20 Aug 2024 07:01  -  21 Aug 2024 07:00  --------------------------------------------------------  IN:    Glucerna 1.5: 540 mL    IV PiggyBack: 150 mL  Total IN: 690 mL    OUT:    Indwelling Catheter - Urethral (mL): 1940 mL  Total OUT: 1940 mL    Total NET: -1250 mL          HOSPITAL MEDICATIONS:  Standing Meds:  albuterol/ipratropium for Nebulization 3 milliLiter(s) Nebulizer every 6 hours  aspirin  chewable 81 milliGRAM(s) Oral daily  chlorhexidine 0.12% Liquid 15 milliLiter(s) Oral Mucosa two times a day  chlorhexidine 2% Cloths 1 Application(s) Topical daily  dextrose 5%. 1000 milliLiter(s) IV Continuous <Continuous>  dextrose 5%. 1000 milliLiter(s) IV Continuous <Continuous>  dextrose 50% Injectable 25 Gram(s) IV Push once  dextrose 50% Injectable 25 Gram(s) IV Push once  dextrose Oral Gel 15 Gram(s) Oral once  doxazosin 2 milliGRAM(s) Oral at bedtime  escitalopram 10 milliGRAM(s) Oral daily  glucagon  Injectable 1 milliGRAM(s) IntraMuscular once  heparin   Injectable 5000 Unit(s) SubCutaneous every 8 hours  insulin lispro (ADMELOG) corrective regimen sliding scale   SubCutaneous every 6 hours  insulin NPH human recombinant 7 Unit(s) SubCutaneous every 6 hours  levETIRAcetam  Solution 500 milliGRAM(s) Oral two times a day  midodrine 10 milliGRAM(s) Oral every 8 hours  modafinil 100 milliGRAM(s) Oral <User Schedule>  pantoprazole   Suspension 40 milliGRAM(s) Oral daily  petrolatum Ophthalmic Ointment 1 Application(s) Both EYES two times a day  polyethylene glycol 3350 17 Gram(s) Oral daily  senna Syrup 10 milliLiter(s) Oral at bedtime  sodium chloride 3%  Inhalation 4 milliLiter(s) Inhalation every 6 hours  ticagrelor 60 milliGRAM(s) Oral every 12 hours  valproic  acid Syrup 500 milliGRAM(s) Oral two times a day  zinc oxide 40% Paste 1 Application(s) Topical two times a day      PRN Meds:      PHYSICAL EXAM:  GENERAL: NAD, lying in bed comfortably  HEAD:  Atraumatic, Normocephalic  EYES: EOMI, PERRLA, conjunctiva and sclera clear  ENT: Moist mucous membranes  NECK: Supple, No JVD  CHEST/LUNG: Clear to auscultation bilaterally; No rales, rhonchi, wheezing, or rubs. Unlabored respirations  HEART: Regular rate and rhythm; No murmurs, rubs, or gallops  ABDOMEN: Bowel sounds present; Soft, Nontender, Nondistended. No hepatomegaly  EXTREMITIES:  2+ Peripheral Pulses, brisk capillary refill. No clubbing, cyanosis, or edema  NERVOUS SYSTEM:  Alert & Oriented X3, speech clear. No deficits   MSK: FROM all 4 extremities, full and equal strength  SKIN: No rashes or lesions   Epi Huntley MD  Internal Medicine, PGY3    MICU Progress Note    CHIEF COMPLAINT: SHAIK CHARANJIT is a 89 yo M with h/o CAD, CABG, s/p stents, s/p PPM placement, HTN, HLD, DM, CKD, CVA x3, s/p PEG tube for dysphagia and acute cholecystitis s/p cholecystostomy ( not surgical candidate, s/p cholecystomy tube removal) initially presented/admitted on 8/7 2 to MS change; found to have UTI with sepsis and acute on CKD s/p PEA cardiac arrest 8/8 likely 2/2 AHRF 2/2 aspiration s/p ROSC (after 13 mins), now transferred to [facility] for MRI Brain to assess prognostication iso concern for anoxic brain injury.    Interval Events:    Meds administered:  ticagrelor: 60 milliGRAM(s) Oral (08-21 @ 06:21)  insulin lispro (ADMELOG) corrective regimen sliding scale: 2 Unit(s) SubCutaneous (08-21 @ 05:33)  modafinil: 100 milliGRAM(s) Oral (08-21 @ 05:26)  zinc oxide 40% Paste: 1 Application(s) Topical (08-21 @ 05:21)  valproic  acid Syrup: 500 milliGRAM(s) Oral (08-21 @ 05:21)  petrolatum Ophthalmic Ointment: 1 Application(s) Both EYES (08-21 @ 05:21)  levETIRAcetam  Solution: 500 milliGRAM(s) Oral (08-21 @ 05:21)  heparin   Injectable: 5000 Unit(s) SubCutaneous (08-21 @ 05:21)  chlorhexidine 0.12% Liquid: 15 milliLiter(s) Oral Mucosa (08-21 @ 05:21)  sodium chloride 3%  Inhalation: 4 milliLiter(s) Inhalation (08-21 @ 03:36)  albuterol/ipratropium for Nebulization: 3 milliLiter(s) Nebulizer (08-21 @ 03:36)  insulin NPH human recombinant: 7 Unit(s) SubCutaneous (08-21 @ 00:25)  sodium chloride 3%  Inhalation: 4 milliLiter(s) Inhalation (08-20 @ 22:59)  albuterol/ipratropium for Nebulization: 3 milliLiter(s) Nebulizer (08-20 @ 22:59)  midodrine: 10 milliGRAM(s) Oral (08-20 @ 21:05)  heparin   Injectable: 5000 Unit(s) SubCutaneous (08-20 @ 21:05)  doxazosin: 2 milliGRAM(s) Oral (08-20 @ 21:05)        OBJECTIVE:  Vitals:  T(F): 100 (08-21-24 @ 04:00), Max: 100.4 (08-20-24 @ 12:00)  HR: 92 (08-21-24 @ 06:25) (89 - 124)  BP: 117/50 (08-21-24 @ 06:00) (108/56 - 127/60)  BP(mean): 68 (08-21-24 @ 06:00) (64 - 88)  ABP: --  ABP(mean): --  RR: 19 (08-21-24 @ 06:00) (15 - 25)  SpO2: 98% (08-21-24 @ 06:25) (97% - 100%)  CVP(mm Hg): --  I/O Detail 24H    20 Aug 2024 07:01  -  21 Aug 2024 07:00  --------------------------------------------------------  IN:    Glucerna 1.5: 540 mL    IV PiggyBack: 150 mL  Total IN: 690 mL    OUT:    Indwelling Catheter - Urethral (mL): 1940 mL  Total OUT: 1940 mL    Total NET: -1250 mL          HOSPITAL MEDICATIONS:  Standing Meds:  albuterol/ipratropium for Nebulization 3 milliLiter(s) Nebulizer every 6 hours  aspirin  chewable 81 milliGRAM(s) Oral daily  chlorhexidine 0.12% Liquid 15 milliLiter(s) Oral Mucosa two times a day  chlorhexidine 2% Cloths 1 Application(s) Topical daily  dextrose 5%. 1000 milliLiter(s) IV Continuous <Continuous>  dextrose 5%. 1000 milliLiter(s) IV Continuous <Continuous>  dextrose 50% Injectable 25 Gram(s) IV Push once  dextrose 50% Injectable 25 Gram(s) IV Push once  dextrose Oral Gel 15 Gram(s) Oral once  doxazosin 2 milliGRAM(s) Oral at bedtime  escitalopram 10 milliGRAM(s) Oral daily  glucagon  Injectable 1 milliGRAM(s) IntraMuscular once  heparin   Injectable 5000 Unit(s) SubCutaneous every 8 hours  insulin lispro (ADMELOG) corrective regimen sliding scale   SubCutaneous every 6 hours  insulin NPH human recombinant 7 Unit(s) SubCutaneous every 6 hours  levETIRAcetam  Solution 500 milliGRAM(s) Oral two times a day  midodrine 10 milliGRAM(s) Oral every 8 hours  modafinil 100 milliGRAM(s) Oral <User Schedule>  pantoprazole   Suspension 40 milliGRAM(s) Oral daily  petrolatum Ophthalmic Ointment 1 Application(s) Both EYES two times a day  polyethylene glycol 3350 17 Gram(s) Oral daily  senna Syrup 10 milliLiter(s) Oral at bedtime  sodium chloride 3%  Inhalation 4 milliLiter(s) Inhalation every 6 hours  ticagrelor 60 milliGRAM(s) Oral every 12 hours  valproic  acid Syrup 500 milliGRAM(s) Oral two times a day  zinc oxide 40% Paste 1 Application(s) Topical two times a day      PRN Meds:      PHYSICAL EXAM:  GENERAL: intubated, in no acute distress  HEAD:  Atraumatic, Normocephalic  EYES: EOMI, PERRLA, conjunctiva and sclera clear  ENT: Moist mucous membranes  NECK: Supple, No JVD  CHEST/LUNG: Clear to auscultation bilaterally; No rales, rhonchi, wheezing, or rubs. Unlabored respirations  HEART: Regular rate and rhythm; No murmurs, rubs, or gallops  ABDOMEN: Bowel sounds present; Soft, Nontender, Nondistended. No hepatomegaly  EXTREMITIES:  2+ Peripheral Pulses, brisk capillary refill. No clubbing, cyanosis, or edema  NERVOUS SYSTEM:  AAOx0; withdraws to pain, does not follow commands   MSK: equal muscle bulk  SKIN: No rashes or lesions

## 2024-08-21 NOTE — CONSULT NOTE ADULT - PROBLEM SELECTOR RECOMMENDATION 3
Family (4 sons, 1 daughter) requested palliative care consultation. Family is aware of patient's poor prognosis iso imaging consistent with anoxic brain injury. Family was interested in learning details about palliative care options, including comfort care and hospice. Family requested patient be made DNR with tentative plans for palliative extubation in the near future. For now, family is deferring "comfort care" measures, and want to continue blood draws and treatment. Family also mentioned interest in continuing feeds through the PEG tube for now.  - maintain feeds  - change code status for DNR  - tentative plan for palliative extubation  - continue goals of care conversations, patient considering comfort care following extubation - likely due to aspiration   - s/p course of Zosyn  - patient intubated   - vent management per primary team, potential plan for palliative extubation pending family decision on date/time

## 2024-08-21 NOTE — CONSULT NOTE ADULT - SUBJECTIVE AND OBJECTIVE BOX
Date of Service 08-21-24 @ 12:11     Reason for Consultation:	[] Pain		[x] Goals of Care		[] Non-pain symptoms    [] End of life discussion		[] Other:    HPI:  89 yo M with h/o CAD, CABG, s/p stents, s/p PPM placement, HTN, HLD, DM, CKD, CVA x3, s/p PEG tube for dysphagia and acute cholecystitis s/p cholecystostomy ( not surgical candidate, s/p cholecystomy tube removal) initially presented/admitted on 8/7 2 to MS change; found to have UTI with sepsis and acute on CKD s/p PEA cardiac arrest 8/8 likely 2/2 AHRF 2/2 aspiration s/p ROSC (after 13 mins), now transferred to [facility] for MRI Brain to assess prognostication iso concern for anoxic brain injury.    Interval History:   Palliative care consulted for goals of care in setting of anoxic brain injury seen on MRI.       PERTINENT PM/SXH:   Hyperlipemia    Hypertension    Coronary Artery Disease    Diabetes Mellitus Type II    Stented Coronary Artery    Neuropathy    Myocardial infarction    Stroke    Myoclonic jerking    Stage 3 chronic kidney disease      History of Cataract Extraction    Hx of CABG    H/O coronary angiogram    S/P coronary artery stent placement    S/P placement of cardiac pacemaker      FAMILY HISTORY:      Family Hx substance abuse [ ]yes [x ]no    ITEMS NOT CHECKED ARE NOT PRESENT    SOCIAL HISTORY:   Significant other/partner[ ]  Children[ ]  Mandaeism/Spirituality:  Substance hx:  [ ]   Tobacco hx:  [ ]   Alcohol hx: [ ]   Home Opioid hx:  [ ] I-Stop Reference No:  Living Situation: [ ]Home  [ ]Long term care  [ ]Rehab [ ]Other  Home Services: [ ] HHA [ ] Henry RN [ ] Hospice      ADVANCE DIRECTIVES:    MOLST  [ ]  Living Will  [ ]   DECISION MAKER(s):  [ ] Health Care Proxy(s)  [ ] Surrogate(s)  [ ] Guardian           Name(s): Phone Number(s):    BASELINE (I)ADL(s) (prior to admission):  [place]: [ ]Total  [ ] Moderate [ ]Dependent    Allergies    Cipro (Unknown)  fluoroquinolone antibiotics (Other)  carbamazepine (Other)  Tegretol (Unknown)    Intolerances    MEDICATIONS  (STANDING):  albuterol/ipratropium for Nebulization 3 milliLiter(s) Nebulizer every 6 hours  aspirin  chewable 81 milliGRAM(s) Oral daily  chlorhexidine 0.12% Liquid 15 milliLiter(s) Oral Mucosa two times a day  chlorhexidine 2% Cloths 1 Application(s) Topical daily  dextrose 5%. 1000 milliLiter(s) (50 mL/Hr) IV Continuous <Continuous>  dextrose 5%. 1000 milliLiter(s) (100 mL/Hr) IV Continuous <Continuous>  dextrose 50% Injectable 25 Gram(s) IV Push once  dextrose 50% Injectable 25 Gram(s) IV Push once  dextrose Oral Gel 15 Gram(s) Oral once  doxazosin 2 milliGRAM(s) Oral at bedtime  escitalopram 10 milliGRAM(s) Oral daily  glucagon  Injectable 1 milliGRAM(s) IntraMuscular once  heparin   Injectable 5000 Unit(s) SubCutaneous every 8 hours  insulin lispro (ADMELOG) corrective regimen sliding scale   SubCutaneous every 6 hours  insulin NPH human recombinant 7 Unit(s) SubCutaneous every 6 hours  levETIRAcetam  Solution 500 milliGRAM(s) Oral two times a day  midodrine 10 milliGRAM(s) Oral every 8 hours  modafinil 100 milliGRAM(s) Oral <User Schedule>  pantoprazole   Suspension 40 milliGRAM(s) Oral daily  petrolatum Ophthalmic Ointment 1 Application(s) Both EYES two times a day  polyethylene glycol 3350 17 Gram(s) Oral daily  senna Syrup 10 milliLiter(s) Oral at bedtime  sodium chloride 3%  Inhalation 4 milliLiter(s) Inhalation every 6 hours  ticagrelor 60 milliGRAM(s) Oral every 12 hours  valproic  acid Syrup 500 milliGRAM(s) Oral two times a day  zinc oxide 40% Paste 1 Application(s) Topical two times a day    MEDICATIONS  (PRN):        ITEMS UNCHECKED ARE NOT PRESENT     PRESENT SYMPTOMS: [x ]Unable to self-report due to altered mental status  [ ] CPOT [ ] PAINADs [ ] RDOS  Source if other than patient:  [ ]Family   [ ]Team     Pain: [ ]yes [x ]no  QOL impact -   Location -                    Aggravating factors -  Quality -  Radiation -  Timing-  Severity (0-10 scale):  Minimal acceptable level / Pain goal (0-10 scale):     CPOT:    https://www.[facility].org/getattachment/[email or web address]/Critical-Care-Pain-Observation-Tool-(CPOT)    Dyspnea:                           [ ]Mild [ ]Moderate [ ]Severe  Anxiety:                             [ ]Mild [ ]Moderate [ ]Severe  Agitation:                          [ ]Mild [ ]Moderate [ ]Severe  Fatigue:                             [ ]Mild [ ]Moderate [ ]Severe  Nausea:                             [ ]Mild [ ]Moderate [ ]Severe  Loss of appetite:              [ ]Mild [ ]Moderate [ ]Severe  Constipation:                   [ ]Mild [ ]Moderate [ ]Severe  Diarrhea:                          [ ]Mild [ ]Moderate [ ]Severe  Pruritus:                            [ ]Mild [ ]Moderate [ ]Severe  Depression:                      [ ]Mild [ ]Moderate [ ]Severe    PCSSQ[Palliative Care Spiritual Screening Question]   Severity (0-10):  Score of 4 or > indicate consideration of Chaplaincy referral.  Chaplaincy Referral: [ ] yes [ ] refused [ ] following [ x] deferred    Caregiver [place]? : [ ] yes [ ] no [ ] Declined   [x ] Deferred            Social work referral [ ] Patient & Family Centered Care Referral [ ]     Anticipatory Grief present?:  [ ] yes [ ] no  [x ] Deferred                  Social work referral [ ] Chaplaincy Referral[ ]    Other Symptoms:  [ ]All other review of systems negative   [x ] Unable to obtain due to poor mentation     PHYSICAL EXAM:  Vital Signs Last 24 Hrs  T(C): 37.9 (21 Aug 2024 08:00), Max: 37.9 (21 Aug 2024 08:00)  T(F): 100.2 (21 Aug 2024 08:00), Max: 100.2 (21 Aug 2024 08:00)  HR: 94 (21 Aug 2024 11:30) (89 - 124)  BP: 117/65 (21 Aug 2024 11:00) (107/38 - 127/60)  BP(mean): 78 (21 Aug 2024 11:00) (58 - 81)  RR: 22 (21 Aug 2024 11:00) (14 - 22)  SpO2: 100% (21 Aug 2024 11:30) (97% - 100%)    Parameters below as of 21 Aug 2024 11:00  Patient On (Oxygen Delivery Method): ventilator    O2 Concentration (%): 30   Mode: CPAP with PS  FiO2: 30  PEEP: 5  PS: 5    I&O's Summary    20 Aug 2024 07:01  -  21 Aug 2024 07:00  --------------------------------------------------------  IN: 690 mL / OUT: 1940 mL / NET: -1250 mL    21 Aug 2024 07:01  -  21 Aug 2024 12:11  --------------------------------------------------------  IN: 90 mL / OUT: 150 mL / NET: -60 mL        GENERAL:  [ ]Alert  [ ]Oriented x   [ ]Lethargic  [ ]Cachexia  [x ]Unarousable  [ ]Verbal  [x ]Non-Verbal  [ ] No Distress  Behavioral:   [ ] Anxiety  [ ] Delirium [ ] Agitation [ ] Calm  [ ] Other  HEENT:  [ ]Normal  [ ] Temporal Wasting  [ ]Dry mouth   [x ]ET Tube/Trach  [ ]Oral lesions  [ ] Mucositis  PULMONARY:   [ ]Clear [ ]Tachypnea  [ ]Audible excessive secretions   [x ]Rhonchi        [ ]Right [ ]Left [ ]Bilateral  [ ]Crackles        [ ]Right [ ]Left [ ]Bilateral  [ ]Wheezing     [ ]Right [ ]Left [ ]Bilateral  [ ]Diminished breath sounds [ ]right [ ]left [ ]bilateral  CARDIOVASCULAR:    [x ]Regular [ ]Irregular [ ]Tachy  [ ]Chacho [ ]Murmur [ ]Other  GASTROINTESTINAL:  [x ]Soft  [ ]Distended   [ ]+BS  [x ]Non tender [ ]Tender  [ ]PEG [ ]OGT/ NGT  Last BM:   GENITOURINARY:  [ ]Normal [ ] Incontinent   [ ]Oliguria/Anuria   [ ]Moss  MUSCULOSKELETAL:   [ ]Normal   [ ]Weakness  [ ]Bed/Wheelchair bound [ ]Edema  [  ] amputation  [  ] contraction  NEUROLOGIC:   [ ]No focal deficits  [ ]Cognitive impairment  [ ]Dysphagia [ ]Dysarthria [ ]Paresis [x ]Other - AAOx0, does not withdraw to painful stimuli, pupils equal round reactive to light  SKIN: See Nursing Skin Assessment for further details  [x ]Normal    [ ]Rash  [ ]Pressure ulcer(s)       Present on admission [ ]y [ ]n   [  ]  Wound    [  ] hyperpigmentation    CRITICAL CARE:  [ ] Shock Present  [ ]Septic [ ]Cardiogenic [ ]Neurologic [ ]Hypovolemic  [ ]  Vasopressors [ ]  Inotropes   [x ]Respiratory failure present [x ]Mechanical ventilation [ ]Non-invasive ventilatory support [ ]High flow  Mode: CPAP with PS, FiO2: 30, PEEP: 5, PS: 5  [x ]Acute  [ ]Chronic [x ]Hypoxic  [ ]Hypercarbic [ ]Other  [ ]Other organ failure     LABS:  reviewed                         7.0    10.95 )-----------( 287      ( 21 Aug 2024 00:20 )             21.8   08-21    143  |  105  |  43<H>  ----------------------------<  127<H>  3.8   |  24  |  1.54<H>    Ca    8.3<L>      21 Aug 2024 00:20  Phos  3.0     08-21  Mg     2.10     08-21    TPro  6.9  /  Alb  1.9<L>  /  TBili  0.2  /  DBili  x   /  AST  22  /  ALT  10  /  AlkPhos  63  08-21  PT/INR - ( 21 Aug 2024 00:20 )   PT: 13.0 sec;   INR: 1.16 ratio         PTT - ( 21 Aug 2024 00:20 )  PTT:31.6 sec  Urinalysis Basic - ( 21 Aug 2024 00:20 )    Color: x / Appearance: x / SG: x / pH: x  Gluc: 127 mg/dL / Ketone: x  / Bili: x / Urobili: x   Blood: x / Protein: x / Nitrite: x   Leuk Esterase: x / RBC: x / WBC x   Sq Epi: x / Non Sq Epi: x / Bacteria: x      CAPILLARY BLOOD GLUCOSE      POCT Blood Glucose.: 147 mg/dL (21 Aug 2024 11:16)  POCT Blood Glucose.: 173 mg/dL (21 Aug 2024 05:16)  POCT Blood Glucose.: 124 mg/dL (21 Aug 2024 00:19)  POCT Blood Glucose.: 187 mg/dL (20 Aug 2024 18:08)      RADIOLOGY & ADDITIONAL STUDIES: reviewed     PROTEIN CALORIE MALNUTRITION PRESENT: [ ]mild [ ]moderate [ ]severe [ ]underweight [ ]morbid obesity  https://www.andeal.org/vault/3937/web/files/ONC/Table_Clinical%20Characteristics%20to%20Document%20Malnutrition-White%20JV%20et%20al%000076.pdf    Height (cm): 175.3 (08-18-24 @ 17:12), 175.3 (08-07-24 @ 00:29), 175.3 (07-21-24 @ 06:00)  Weight (kg): 79.7 (08-18-24 @ 17:12), 79.8 (08-17-24 @ 06:00), 74.7 (07-21-24 @ 06:00)  BMI (kg/m2): 25.9 (08-18-24 @ 17:12), 26 (08-17-24 @ 06:00), 24.3 (08-07-24 @ 00:29)    [ ]PPSV2 < or = to 30% [ ]significant weight loss  [ ]poor nutritional intake  [ ]anasarca [ ]Artificial Nutrition      REFERRALS:   [ ]Chaplaincy  [ ]Hospice  [ ]Child Life  [ ]Social Work  [ ]Case management [ ]Holistic Therapy     Goals of Care Document:  Date of Service 08-21-24 @ 12:11     Reason for Consultation:	[] Pain		[x] Goals of Care		[] Non-pain symptoms    [] End of life discussion		[] Other:    HPI:  89 yo M with h/o CAD, CABG, s/p stents, s/p PPM placement, HTN, HLD, DM, CKD, CVA x3, s/p PEG tube for dysphagia and acute cholecystitis s/p cholecystostomy ( not surgical candidate, s/p cholecystomy tube removal) initially presented/admitted on 8/7 2 to MS change; found to have UTI with sepsis and acute on CKD s/p PEA cardiac arrest 8/8 likely 2/2 AHRF 2/2 aspiration s/p ROSC (after 13 mins), now transferred to [facility] for MRI Brain to assess prognostication iso concern for anoxic brain injury.    Interval History:   Palliative care consulted for goals of care in setting of anoxic brain injury seen on MRI.     PERTINENT PM/SXH:   Hyperlipemia  Hypertension  Coronary Artery Disease  Diabetes Mellitus Type II  Stented Coronary Artery  Neuropathy  Myocardial infarction  Stroke  Myoclonic jerking  Stage 3 chronic kidney disease  History of Cataract Extraction  Hx of CABG  H/O coronary angiogram  S/P coronary artery stent placement  S/P placement of cardiac pacemaker    FAMILY HISTORY: unable to obtain due to encephalopathy     ITEMS NOT CHECKED ARE NOT PRESENT    SOCIAL HISTORY:   Significant other/partner[ ]  Children[x ]  Jainism/Spirituality:  Substance hx:  [ ]   Tobacco hx:  [ ]   Alcohol hx: [ ]   Home Opioid hx:  [ ] I-Stop Reference No: [id number]  Prescription Information      PDI Filter:    PDI	Current Rx	Drug Type	Rx Written	Rx Dispensed	Drug	Quantity	Days Supply	Prescriber Name	Prescriber JAVIER #	Payment Method	Dispenser  A	N	B	05/10/2024	05/10/2024	clonazepam 0.5 mg tablet	10	20	Salvador Maldonado J	[id number]	[facility]  A	N	B	04/29/2024	04/29/2024	clonazepam 0.5 mg tablet	7	14	Salvador Maldonado J	[id number]	[place]	[facility]  A	N	B	04/08/2024	04/08/2024	clonazepam 0.5 mg tablet	10	20	Salvador Maldonado J	[id number]	[facility]  A	N	B	04/03/2024	04/03/2024	clonazepam 0.5 mg tablet	15	30	Salvador Maldonado J	[id number]	Medicare	[facility]  A	N	B	03/28/2024	03/28/2024	lorazepam 0.5 mg tablet	15	15	Salvador Maldonado J	[id number]	Medicare	[facility]  A	N	B	03/27/2024	03/27/2024	lorazepam 0.5 mg tablet	7	14	Salvador Maldonado J	[id number]	Medicare	[facility]  Living Situation: [x ]Home  [ ]Long term care  [ ]Rehab [ ]Other      ADVANCE DIRECTIVES:    MOLST  [ ]  Living Will  [ ]   DECISION MAKER(s):  [ ] Health Care Proxy(s)  [ x] Surrogate(s)  [ ] Guardian           Name(s): Phone Number(s):  Shaik Lázaro: [phone]    BASELINE (I)ADL(s) (prior to admission):  [place]: [ ]Total  [ ] Moderate [x ]Dependent    Allergies    Cipro (Unknown)  fluoroquinolone antibiotics (Other)  carbamazepine (Other)  Tegretol (Unknown)    Intolerances    MEDICATIONS  (STANDING):  albuterol/ipratropium for Nebulization 3 milliLiter(s) Nebulizer every 6 hours  aspirin  chewable 81 milliGRAM(s) Oral daily  chlorhexidine 0.12% Liquid 15 milliLiter(s) Oral Mucosa two times a day  chlorhexidine 2% Cloths 1 Application(s) Topical daily  dextrose 5%. 1000 milliLiter(s) (50 mL/Hr) IV Continuous <Continuous>  dextrose 5%. 1000 milliLiter(s) (100 mL/Hr) IV Continuous <Continuous>  dextrose 50% Injectable 25 Gram(s) IV Push once  dextrose 50% Injectable 25 Gram(s) IV Push once  dextrose Oral Gel 15 Gram(s) Oral once  doxazosin 2 milliGRAM(s) Oral at bedtime  escitalopram 10 milliGRAM(s) Oral daily  glucagon  Injectable 1 milliGRAM(s) IntraMuscular once  heparin   Injectable 5000 Unit(s) SubCutaneous every 8 hours  insulin lispro (ADMELOG) corrective regimen sliding scale   SubCutaneous every 6 hours  insulin NPH human recombinant 7 Unit(s) SubCutaneous every 6 hours  levETIRAcetam  Solution 500 milliGRAM(s) Oral two times a day  midodrine 10 milliGRAM(s) Oral every 8 hours  modafinil 100 milliGRAM(s) Oral <User Schedule>  pantoprazole   Suspension 40 milliGRAM(s) Oral daily  petrolatum Ophthalmic Ointment 1 Application(s) Both EYES two times a day  polyethylene glycol 3350 17 Gram(s) Oral daily  senna Syrup 10 milliLiter(s) Oral at bedtime  sodium chloride 3%  Inhalation 4 milliLiter(s) Inhalation every 6 hours  ticagrelor 60 milliGRAM(s) Oral every 12 hours  valproic  acid Syrup 500 milliGRAM(s) Oral two times a day  zinc oxide 40% Paste 1 Application(s) Topical two times a day    MEDICATIONS  (PRN):        ITEMS UNCHECKED ARE NOT PRESENT     PRESENT SYMPTOMS: [x ]Unable to self-report due to altered mental status  [ ] CPOT [x ] PAINADs [x ] RDOS  Source if other than patient:  [ ]Family   [ ]Team     Pain: [ ]yes [ ]no  QOL impact -   Location -                    Aggravating factors -  Quality -  Radiation -  Timing-  Severity (0-10 scale):  Minimal acceptable level / Pain goal (0-10 scale):     CPOT:    https://www.[facility].org/getattachment/[email or web address]/Critical-Care-Pain-Observation-Tool-(CPOT)    Dyspnea:                           [ ]Mild [ ]Moderate [ ]Severe  Anxiety:                             [ ]Mild [ ]Moderate [ ]Severe  Agitation:                          [ ]Mild [ ]Moderate [ ]Severe  Fatigue:                             [ ]Mild [ ]Moderate [ ]Severe  Nausea:                             [ ]Mild [ ]Moderate [ ]Severe  Loss of appetite:              [ ]Mild [ ]Moderate [ ]Severe  Constipation:                   [ ]Mild [ ]Moderate [ ]Severe  Diarrhea:                          [ ]Mild [ ]Moderate [ ]Severe      PCSSQ[Palliative Care Spiritual Screening Question]   Severity (0-10):  Score of 4 or > indicate consideration of Chaplaincy referral.  Chaplaincy Referral: [ ] yes [x ] declined[ ] following [ ] deferred    Caregiver [place]? : [ ] yes [ ] no [ ] Declined   [x ] Deferred            Social work referral [ ] Patient & Family Centered Care Referral [ ]     Anticipatory Grief present?:  [ ] yes [ ] no  [x ] Deferred                  Social work referral [ ] Chaplaincy Referral[ ]    Other Symptoms:  [ ]All other review of systems negative   [x ] Unable to obtain due to poor mentation     PHYSICAL EXAM:  Vital Signs Last 24 Hrs  T(C): 37.9 (21 Aug 2024 08:00), Max: 37.9 (21 Aug 2024 08:00)  T(F): 100.2 (21 Aug 2024 08:00), Max: 100.2 (21 Aug 2024 08:00)  HR: 94 (21 Aug 2024 11:30) (89 - 124)  BP: 117/65 (21 Aug 2024 11:00) (107/38 - 127/60)  BP(mean): 78 (21 Aug 2024 11:00) (58 - 81)  RR: 22 (21 Aug 2024 11:00) (14 - 22)  SpO2: 100% (21 Aug 2024 11:30) (97% - 100%)    Parameters below as of 21 Aug 2024 11:00  Patient On (Oxygen Delivery Method): ventilator    O2 Concentration (%): 30   Mode: CPAP with PS  FiO2: 30  PEEP: 5  PS: 5    I&O's Summary    20 Aug 2024 07:01  -  21 Aug 2024 07:00  --------------------------------------------------------  IN: 690 mL / OUT: 1940 mL / NET: -1250 mL    21 Aug 2024 07:01  -  21 Aug 2024 12:11  --------------------------------------------------------  IN: 90 mL / OUT: 150 mL / NET: -60 mL        GENERAL:  [ ]Alert  [ ]Oriented x   [ ]Lethargic  [ ]Cachexia  [x ]Unarousable  [ ]Verbal  [x ]Non-Verbal  [ ] No Distress  Behavioral:   [ ] Anxiety  [ ] Delirium [ ] Agitation [ ] Calm  [x ] Other-encephalopathy   HEENT:  [ ]Normal  [ ] Temporal Wasting  [ ]Dry mouth   [x ]ET Tube/Trach  [ ]Oral lesions  [ ] Mucositis  PULMONARY:   [ ]Clear [ ]Tachypnea  [ ]Audible excessive secretions   [x ]Rhonchi        [ ]Right [ ]Left [ ]Bilateral  [ ]Crackles        [ ]Right [ ]Left [ ]Bilateral  [ ]Wheezing     [ ]Right [ ]Left [ ]Bilateral  [ ]Diminished breath sounds [ ]right [ ]left [ ]bilateral  CARDIOVASCULAR:    [x ]Regular [ ]Irregular [ ]Tachy  [ ]Chacho [ ]Murmur [ ]Other  GASTROINTESTINAL:  [x ]Soft  [ ]Distended   [ ]+BS  [x ]Non tender [ ]Tender  [ x]PEG [ ]OGT/ NGT  Last BM: 8/21  GENITOURINARY:  [ ]Normal [ ] Incontinent   [ ]Oliguria/Anuria   [x ]Moss  MUSCULOSKELETAL:   [ ]Normal   [ ]Weakness  [x ]Bed/Wheelchair bound [ ]Edema  [  ] amputation  [  ] contraction  NEUROLOGIC:   [ ]No focal deficits  [x ]Cognitive impairment  [ ]Dysphagia [ ]Dysarthria [ ]Paresis [x ]Other - AAOx0, does not withdraw to painful stimuli, pupils equal round reactive to light  SKIN: See Nursing Skin Assessment for further details  [ ]Normal    [ ]Rash  [ ]Pressure ulcer(s)       Present on admission [ ]y [ ]n   [  ]  Wound    [  ] hyperpigmentation    CRITICAL CARE:  [ ] Shock Present  [ ]Septic [ ]Cardiogenic [ ]Neurologic [ ]Hypovolemic  [ ]  Vasopressors [ ]  Inotropes   [x ]Respiratory failure present [x ]Mechanical ventilation [ ]Non-invasive ventilatory support [ ]High flow  Mode: CPAP with PS, FiO2: 30, PEEP: 5, PS: 5  [x ]Acute  [ ]Chronic [x ]Hypoxic  [ ]Hypercarbic [ ]Other  [ ]Other organ failure     LABS:  reviewed                         7.0    10.95 )-----------( 287      ( 21 Aug 2024 00:20 )             21.8   08-21    143  |  105  |  43<H>  ----------------------------<  127<H>  3.8   |  24  |  1.54<H>    Ca    8.3<L>      21 Aug 2024 00:20  Phos  3.0     08-21  Mg     2.10     08-21    TPro  6.9  /  Alb  1.9<L>  /  TBili  0.2  /  DBili  x   /  AST  22  /  ALT  10  /  AlkPhos  63  08-21  PT/INR - ( 21 Aug 2024 00:20 )   PT: 13.0 sec;   INR: 1.16 ratio         PTT - ( 21 Aug 2024 00:20 )  PTT:31.6 sec  Urinalysis Basic - ( 21 Aug 2024 00:20 )    Color: x / Appearance: x / SG: x / pH: x  Gluc: 127 mg/dL / Ketone: x  / Bili: x / Urobili: x   Blood: x / Protein: x / Nitrite: x   Leuk Esterase: x / RBC: x / WBC x   Sq Epi: x / Non Sq Epi: x / Bacteria: x      CAPILLARY BLOOD GLUCOSE      POCT Blood Glucose.: 147 mg/dL (21 Aug 2024 11:16)  POCT Blood Glucose.: 173 mg/dL (21 Aug 2024 05:16)  POCT Blood Glucose.: 124 mg/dL (21 Aug 2024 00:19)  POCT Blood Glucose.: 187 mg/dL (20 Aug 2024 18:08)      RADIOLOGY & ADDITIONAL STUDIES: reviewed     PROTEIN CALORIE MALNUTRITION PRESENT: [ ]mild [ ]moderate [ ]severe [ ]underweight [ ]morbid obesity  https://www.andeal.org/vault/2440/web/files/ONC/Table_Clinical%20Characteristics%20to%20Document%20Malnutrition-White%20JV%20et%20al%202012.pdf    Height (cm): 175.3 (08-18-24 @ 17:12), 175.3 (08-07-24 @ 00:29), 175.3 (07-21-24 @ 06:00)  Weight (kg): 79.7 (08-18-24 @ 17:12), 79.8 (08-17-24 @ 06:00), 74.7 (07-21-24 @ 06:00)  BMI (kg/m2): 25.9 (08-18-24 @ 17:12), 26 (08-17-24 @ 06:00), 24.3 (08-07-24 @ 00:29)    [x ]PPSV2 < or = to 30% [ ]significant weight loss  [ ]poor nutritional intake  [ ]anasarca [ x]Artificial Nutrition      REFERRALS:   [ ]Chaplaincy  [ ]Hospice  [ ]Child Life  [ ]Social Work  [ x]Case management [ ]Holistic Therapy

## 2024-08-21 NOTE — CONSULT NOTE ADULT - CONVERSATION DETAILS
Family (4 sons, 1 daughter) requested palliative care consultation. Family is aware of patient's poor prognosis iso imaging consistent with anoxic brain injury. Family was interested in learning details about palliative care options, including comfort care and hospice. Family requested patient be made DNR with tentative plans for palliative extubation in the near future. For now, family is deferring "comfort care" measures, and want to continue blood draws and treatment. Family also mentioned interest in continuing feeds through the PEG tube for now. Family (4 sons, 1 daughter) requested palliative care consultation. Family is aware of patient's poor prognosis iso imaging consistent with anoxic brain injury. Family was interested in learning details about palliative care options, including comfort care and hospice. Family requested patient be made DNR with tentative plans for palliative extubation in the near future. For now, family is deferring "comfort care" measures, and want to continue blood draws and treatment. Family also mentioned interest in continuing feeds through the PEG tube for now.  Chaplaincy referral offered; family declined.  Emotional support provided

## 2024-08-21 NOTE — CONSULT NOTE ADULT - TIME BILLING
Time spent for extensive review of the physical chart, electronic medical record, and documentation to obtain collateral information including but not limited to:  [x] Inpatient records (ED, H&P, primary team, and consultants if applicable, care coordination)  [x] Inpatient values/results (biomarkers, immunoassays, imaging, and microbiology results)  [x] Current or proposed treatment plans  [x] Pharmacotherapy review  [x] Discussion and coordinating care with primary team and interdisciplinary staff and floor staff  [x] Discussion including counseling/ education with the surrogate decision maker, or family    In addition to above time, Spent 16 minutes separately discussing goals of care/ advanced care planning with family

## 2024-08-21 NOTE — CONSULT NOTE ADULT - CONVERSATION/DISCUSSION
Prognosis/MOLST Discussed Diagnosis/Prognosis/MOLST Discussed/Treatment Options/Chaplaincy Referral/Hospice Referral

## 2024-08-22 NOTE — CONSULT NOTE ADULT - ASSESSMENT
Assessment:  Patient SHAIK CHARANJIT is a 89y (05-Oct-1933) with a PMHx significant for CAD, CABG, s/p stents, s/p PPM placement, HTN, HLD, DM, CKD, CVA x3, s/p PEG tube for dysphagia and acute cholecystitis s/p cholecystostomy (not surgical candidate, s/p cholecystomy tube removal), history of myoclonus on valproate at home, initially presented/admitted on 8/7 at [facility] for MS change; found to have UTI with sepsis and acute on CKD had PEA cardiac arrest 8/8 likely 2/2 AHRF 2/2 aspiration s/p ROSC (after 13-20 mins), now transferred to [facility] for MRI Brain to assess prognostication iso concern for anoxic brain injury. Currently on Keppra 500 BID, Valproate 500 BID(vs 250 TID at home). Off sedation >4 days now. EEG 8/9 showed intermittent subcortical myoclonus, sever cerebral dysfunction but just severe cerebral dysfunction in all EEG since. NSE 8/11 46.2. MRI 8/20 showed cortical restricted diffusion and patchy enhancement in b/l frontal, parietal, occipital lobes indicating hypoxic ischemic injury. Free VPA levels 8/10 was 28.9(high), Serum Valproate levels 8/11 60 (albumin 2.1), unclear if trough. Exam remarkable for pupil reflex, cough reflex present, others absent, no purposeful movements, withdrawal to pain on LE.     Had a discussion with family on 8/22. Communicated prognosis based on exam, EEG, MRI, NSE levels. Poor prognosis with no indication of meaningful neurologic recovery.     Impression: Anoxic brain injury with poor prognosis.    Recs:  []recommend VPA trough levels (30 min before morning dose along with albumin level), titrate VPA based on the levels  []2h EEG for further neuroprognostication (ordered)  []modafinil 100mg in the morning of getting EEG (discussed with epilepsy attending Dr. Chan)  []rest of care as per primary team    Case seen and discussed with attending Dr. Valadez Assessment:  Patient SHAIK CHARANJIT is a 89y (05-Oct-1933) with a PMHx significant for CAD, CABG, s/p stents, s/p PPM placement, HTN, HLD, DM, CKD, CVA x3, s/p PEG tube for dysphagia and acute cholecystitis s/p cholecystostomy (not surgical candidate, s/p cholecystomy tube removal), history of myoclonus on valproate at home, initially presented/admitted on 8/7 at [facility] for MS change; found to have UTI with sepsis and acute on CKD had PEA cardiac arrest 8/8 likely 2/2 AHRF 2/2 aspiration s/p ROSC (after 13-20 mins), now transferred to [facility] for MRI Brain to assess prognostication iso concern for anoxic brain injury. Currently on Keppra 500 BID, Valproate 500 BID(vs 250 TID at home). Off sedation >4 days now. EEG 8/9 showed intermittent subcortical myoclonus, sever cerebral dysfunction but just severe cerebral dysfunction in all EEG since. NSE 8/11 46.2. MRI 8/20 showed cortical restricted diffusion and patchy enhancement in b/l frontal, parietal, occipital lobes indicating hypoxic ischemic injury. Free VPA levels 8/10 was 28.9(high), Serum Valproate levels 8/11 60 (albumin 2.1), unclear if trough. Exam remarkable for pupil reflex, cough reflex present, others absent, no purposeful movements, withdrawal to pain on LE.     Had a discussion with family on 8/22. Communicated prognosis based on exam, EEG, MRI, NSE levels. Poor prognosis with no indication of meaningful neurologic recovery.     Impression: Anoxic brain injury with poor prognosis.    Recs:  []recommend VPA trough levels (30 min before morning dose along with albumin level), titrate VPA based on the levels  []continue levetiracetam 500 mg BID  []2h EEG for further neuroprognostication (ordered)  []modafinil 100mg in the morning of getting EEG (discussed with epilepsy attending Dr. Chan)  []rest of care as per primary team    Case seen and discussed with attending Dr. Valadez

## 2024-08-22 NOTE — PROGRESS NOTE ADULT - PROBLEM SELECTOR PLAN 7
Family (4 sons, 1 daughter) requested palliative care consultation. Family is aware of patient's poor prognosis iso imaging consistent with anoxic brain injury. Family was interested in learning details about palliative care options, including comfort care and hospice. Family requested patient be made DNR with tentative plans for palliative extubation in the near future. For now, family is deferring "comfort care" measures, and want to continue blood draws and treatment. Family also mentioned interest in continuing feeds through the PEG tube for now.  - maintain feeds  - change code status for DNR  - tentative plan for palliative extubation pending date/time  - continue goals of care conversations, patient considering comfort care following extubation. Family (4 sons, 1 daughter) requested palliative care consultation. Family is aware of patient's poor prognosis iso imaging consistent with anoxic brain injury. Family was interested in learning details about palliative care options, including comfort care and hospice. Family requested patient be made DNR with tentative plans for palliative extubation in the near future. For now, family is deferring "comfort care" measures, and want to continue blood draws and treatment. Family also mentioned interest in continuing feeds through the PEG tube for now. Family also requesting formal neurology consult for prognostication.  - maintain feeds  - change code status for DNR  - tentative plan for palliative extubation pending date/time  - continue goals of care conversations, patient considering comfort care following extubation.  - f/u neurology recommendations - 8/22-  Family requesting neurology consult for prognostication. Family maintains they do not want reintubation if patient fails extubation, and they do not want tracheostomy.

## 2024-08-22 NOTE — PROGRESS NOTE ADULT - ATTENDING COMMENTS
Patient is a 89 yo M w/ CAD s/p CABG s/p stents (last stent 5/2022), s/p PPM, HTN, HLD, T2DM, CKD, PVD, prior CVA x3, Myoclonic Jerks/Seizures, history of acute cholecystitis s/p cholecysteostomy who was initially admitted to [facility] on 8/7 after p/w AMS. Patient found to have UTI with sepsis and acute on chronic kidney failure with course c/b PEA arrest x 13 min likely due to AHRF 2/2 aspiration, now with concern for anoxic encephalopathy, transferred to [facility] for MRI brain for prognostication.     #Anoxic encephalopathy  #Post Cardiac Arrest  #MABLE on CKD  #Acute respiratory failure  #Fever  #Pseudomonas Pneumonia  - MRI brain consistent with Hypoxemic ischemic encephalopathy  - Monitor off sedation, EEGs negative. NSE elevated to 46 but 4 days post arrest. Family requesting Neurology consult for prognostication  - c/w mechanical ventilatory support, overbreathes vent, tolerating PSV 5/5 today but still with secretions. c/w airway clearance. Will resend sputum cultures  - d/dangelo Zosyn as completed 10 day course on 8/19, low threshold to resume empiric abx. Currently afebrile with no leukocytosis  - c/w airway clearance, IPV  - Lasix PRN to maintain euvolemia  - f/u L foot xray  - Palliative consult apprecaited. DNR. Discussed with family at bedside today, amenable for palliative extubation after optimization of secretions  - c/w NPH, monitor FSG    Neil Goode MD  Pulmonary & Critical Care

## 2024-08-22 NOTE — PROGRESS NOTE ADULT - SUBJECTIVE AND OBJECTIVE BOX
Epi Huntley MD  Internal Medicine, PGY3    MICU Progress Note    CHIEF COMPLAINT: SHAIK CHARANJIT is a 89yo Male with PMH *** presenting with ***.    Interval Events:    Meds administered:  chlorhexidine 0.12% Liquid: 15 milliLiter(s) Oral Mucosa (08-22 @ 06:18)  petrolatum Ophthalmic Ointment: 1 Application(s) Both EYES (08-22 @ 06:16)  modafinil: 100 milliGRAM(s) Oral (08-22 @ 05:32)  insulin lispro (ADMELOG) corrective regimen sliding scale: 2 Unit(s) SubCutaneous (08-22 @ 05:32)  zinc oxide 40% Paste: 1 Application(s) Topical (08-22 @ 05:15)  ticagrelor: 60 milliGRAM(s) Oral (08-22 @ 05:15)  heparin   Injectable: 5000 Unit(s) SubCutaneous (08-22 @ 05:15)  valproic  acid Syrup: 500 milliGRAM(s) Oral (08-22 @ 05:14)  midodrine: 10 milliGRAM(s) Oral (08-22 @ 05:14)  levETIRAcetam  Solution: 500 milliGRAM(s) Oral (08-22 @ 05:14)  albuterol/ipratropium for Nebulization: 3 milliLiter(s) Nebulizer (08-22 @ 04:55)  sodium chloride 3%  Inhalation: 4 milliLiter(s) Inhalation (08-22 @ 04:50)  HYDROmorphone  Injectable: 0.5 milliGRAM(s) IV Push (08-22 @ 02:28)  (ADM OVERRIDE): 1 Each &lt;See Task&gt; (08-22 @ 02:25)  insulin lispro (ADMELOG) corrective regimen sliding scale: 2 Unit(s) SubCutaneous (08-21 @ 23:23)  HYDROmorphone  Injectable: 0.5 milliGRAM(s) IV Push (08-21 @ 22:15)  (ADM OVERRIDE): 1 Each &lt;See Task&gt; (08-21 @ 22:12)  senna Syrup: 10 milliLiter(s) Oral (08-21 @ 21:45)  midodrine: 10 milliGRAM(s) Oral (08-21 @ 21:45)  heparin   Injectable: 5000 Unit(s) SubCutaneous (08-21 @ 21:45)  doxazosin: 2 milliGRAM(s) Oral (08-21 @ 21:45)  sodium chloride 3%  Inhalation: 4 milliLiter(s) Inhalation (08-21 @ 21:01)  albuterol/ipratropium for Nebulization: 3 milliLiter(s) Nebulizer (08-21 @ 21:01)        OBJECTIVE:  Vitals:  T(F): 99 (08-22-24 @ 07:00), Max: 100.2 (08-21-24 @ 08:00)  HR: 87 (08-22-24 @ 07:00) (87 - 124)  BP: 114/48 (08-22-24 @ 07:00) (100/57 - 124/62)  BP(mean): 68 (08-22-24 @ 07:00) (60 - 78)  ABP: --  ABP(mean): --  RR: 12 (08-22-24 @ 07:00) (11 - 23)  SpO2: 100% (08-22-24 @ 07:00) (94% - 100%)  CVP(mm Hg): --  I/O Detail 24H    21 Aug 2024 07:01  -  22 Aug 2024 07:00  --------------------------------------------------------  IN:    Enteral Tube Flush: 290 mL    Glucerna 1.5: 480 mL  Total IN: 770 mL    OUT:    Indwelling Catheter - Urethral (mL): 2025 mL  Total OUT: 2025 mL    Total NET: -1255 mL          HOSPITAL MEDICATIONS:  Standing Meds:  albuterol/ipratropium for Nebulization 3 milliLiter(s) Nebulizer every 6 hours  aspirin  chewable 81 milliGRAM(s) Oral daily  chlorhexidine 0.12% Liquid 15 milliLiter(s) Oral Mucosa two times a day  chlorhexidine 2% Cloths 1 Application(s) Topical daily  dextrose 5%. 1000 milliLiter(s) IV Continuous <Continuous>  dextrose 5%. 1000 milliLiter(s) IV Continuous <Continuous>  dextrose 50% Injectable 25 Gram(s) IV Push once  dextrose 50% Injectable 25 Gram(s) IV Push once  dextrose Oral Gel 15 Gram(s) Oral once  doxazosin 2 milliGRAM(s) Oral at bedtime  escitalopram 10 milliGRAM(s) Oral daily  glucagon  Injectable 1 milliGRAM(s) IntraMuscular once  heparin   Injectable 5000 Unit(s) SubCutaneous every 8 hours  insulin lispro (ADMELOG) corrective regimen sliding scale   SubCutaneous every 6 hours  insulin NPH human recombinant 7 Unit(s) SubCutaneous every 6 hours  levETIRAcetam  Solution 500 milliGRAM(s) Oral two times a day  midodrine 10 milliGRAM(s) Oral every 8 hours  modafinil 100 milliGRAM(s) Oral <User Schedule>  pantoprazole   Suspension 40 milliGRAM(s) Oral daily  petrolatum Ophthalmic Ointment 1 Application(s) Both EYES two times a day  polyethylene glycol 3350 17 Gram(s) Oral daily  senna Syrup 10 milliLiter(s) Oral at bedtime  sodium chloride 3%  Inhalation 4 milliLiter(s) Inhalation every 6 hours  ticagrelor 60 milliGRAM(s) Oral every 12 hours  valproic  acid Syrup 500 milliGRAM(s) Oral two times a day  zinc oxide 40% Paste 1 Application(s) Topical two times a day      PRN Meds:      PHYSICAL EXAM:  GENERAL: NAD, lying in bed comfortably  HEAD:  Atraumatic, Normocephalic  EYES: EOMI, PERRLA, conjunctiva and sclera clear  ENT: Moist mucous membranes  NECK: Supple, No JVD  CHEST/LUNG: Clear to auscultation bilaterally; No rales, rhonchi, wheezing, or rubs. Unlabored respirations  HEART: Regular rate and rhythm; No murmurs, rubs, or gallops  ABDOMEN: Bowel sounds present; Soft, Nontender, Nondistended. No hepatomegaly  EXTREMITIES:  2+ Peripheral Pulses, brisk capillary refill. No clubbing, cyanosis, or edema  NERVOUS SYSTEM:  Alert & Oriented X3, speech clear. No deficits   MSK: FROM all 4 extremities, full and equal strength  SKIN: No rashes or lesions   Epi Huntley MD  Internal Medicine, PGY3    MICU Progress Note    CHIEF COMPLAINT: Pt is a 89 yo M with h/o CAD, CABG, s/p stents, s/p PPM placement, HTN, HLD, DM, CKD, CVA x3, s/p PEG tube for dysphagia and acute cholecystitis s/p cholecystostomy ( not surgical candidate, s/p cholecystomy tube removal) initially presented/admitted on 8/7 2 to MS change; found to have UTI with sepsis and acute on CKD s/p PEA cardiac arrest 8/8 likely 2/2 AHRF 2/2 aspiration s/p ROSC (after 13 mins), now transferred to [facility] for MRI Brain to assess prognostication iso concern for anoxic brain injury.    Interval Events:  - Received Dilaudid at 10PMand 2AM overnight     Meds administered:  chlorhexidine 0.12% Liquid: 15 milliLiter(s) Oral Mucosa (08-22 @ 06:18)  petrolatum Ophthalmic Ointment: 1 Application(s) Both EYES (08-22 @ 06:16)  modafinil: 100 milliGRAM(s) Oral (08-22 @ 05:32)  insulin lispro (ADMELOG) corrective regimen sliding scale: 2 Unit(s) SubCutaneous (08-22 @ 05:32)  zinc oxide 40% Paste: 1 Application(s) Topical (08-22 @ 05:15)  ticagrelor: 60 milliGRAM(s) Oral (08-22 @ 05:15)  heparin   Injectable: 5000 Unit(s) SubCutaneous (08-22 @ 05:15)  valproic  acid Syrup: 500 milliGRAM(s) Oral (08-22 @ 05:14)  midodrine: 10 milliGRAM(s) Oral (08-22 @ 05:14)  levETIRAcetam  Solution: 500 milliGRAM(s) Oral (08-22 @ 05:14)  albuterol/ipratropium for Nebulization: 3 milliLiter(s) Nebulizer (08-22 @ 04:55)  sodium chloride 3%  Inhalation: 4 milliLiter(s) Inhalation (08-22 @ 04:50)  HYDROmorphone  Injectable: 0.5 milliGRAM(s) IV Push (08-22 @ 02:28)  (ADM OVERRIDE): 1 Each &lt;See Task&gt; (08-22 @ 02:25)  insulin lispro (ADMELOG) corrective regimen sliding scale: 2 Unit(s) SubCutaneous (08-21 @ 23:23)  HYDROmorphone  Injectable: 0.5 milliGRAM(s) IV Push (08-21 @ 22:15)  (ADM OVERRIDE): 1 Each &lt;See Task&gt; (08-21 @ 22:12)  senna Syrup: 10 milliLiter(s) Oral (08-21 @ 21:45)  midodrine: 10 milliGRAM(s) Oral (08-21 @ 21:45)  heparin   Injectable: 5000 Unit(s) SubCutaneous (08-21 @ 21:45)  doxazosin: 2 milliGRAM(s) Oral (08-21 @ 21:45)  sodium chloride 3%  Inhalation: 4 milliLiter(s) Inhalation (08-21 @ 21:01)  albuterol/ipratropium for Nebulization: 3 milliLiter(s) Nebulizer (08-21 @ 21:01)        OBJECTIVE:  Vitals:  T(F): 99 (08-22-24 @ 07:00), Max: 100.2 (08-21-24 @ 08:00)  HR: 87 (08-22-24 @ 07:00) (87 - 124)  BP: 114/48 (08-22-24 @ 07:00) (100/57 - 124/62)  BP(mean): 68 (08-22-24 @ 07:00) (60 - 78)  ABP: --  ABP(mean): --  RR: 12 (08-22-24 @ 07:00) (11 - 23)  SpO2: 100% (08-22-24 @ 07:00) (94% - 100%)  CVP(mm Hg): --  I/O Detail 24H    21 Aug 2024 07:01  -  22 Aug 2024 07:00  --------------------------------------------------------  IN:    Enteral Tube Flush: 290 mL    Glucerna 1.5: 480 mL  Total IN: 770 mL    OUT:    Indwelling Catheter - Urethral (mL): 2025 mL  Total OUT: 2025 mL    Total NET: -1255 mL          HOSPITAL MEDICATIONS:  Standing Meds:  albuterol/ipratropium for Nebulization 3 milliLiter(s) Nebulizer every 6 hours  aspirin  chewable 81 milliGRAM(s) Oral daily  chlorhexidine 0.12% Liquid 15 milliLiter(s) Oral Mucosa two times a day  chlorhexidine 2% Cloths 1 Application(s) Topical daily  dextrose 5%. 1000 milliLiter(s) IV Continuous <Continuous>  dextrose 5%. 1000 milliLiter(s) IV Continuous <Continuous>  dextrose 50% Injectable 25 Gram(s) IV Push once  dextrose 50% Injectable 25 Gram(s) IV Push once  dextrose Oral Gel 15 Gram(s) Oral once  doxazosin 2 milliGRAM(s) Oral at bedtime  escitalopram 10 milliGRAM(s) Oral daily  glucagon  Injectable 1 milliGRAM(s) IntraMuscular once  heparin   Injectable 5000 Unit(s) SubCutaneous every 8 hours  insulin lispro (ADMELOG) corrective regimen sliding scale   SubCutaneous every 6 hours  insulin NPH human recombinant 7 Unit(s) SubCutaneous every 6 hours  levETIRAcetam  Solution 500 milliGRAM(s) Oral two times a day  midodrine 10 milliGRAM(s) Oral every 8 hours  modafinil 100 milliGRAM(s) Oral <User Schedule>  pantoprazole   Suspension 40 milliGRAM(s) Oral daily  petrolatum Ophthalmic Ointment 1 Application(s) Both EYES two times a day  polyethylene glycol 3350 17 Gram(s) Oral daily  senna Syrup 10 milliLiter(s) Oral at bedtime  sodium chloride 3%  Inhalation 4 milliLiter(s) Inhalation every 6 hours  ticagrelor 60 milliGRAM(s) Oral every 12 hours  valproic  acid Syrup 500 milliGRAM(s) Oral two times a day  zinc oxide 40% Paste 1 Application(s) Topical two times a day      PRN Meds:      PHYSICAL EXAM:  GENERAL: NAD, lying in bed comfortably  HEAD:  Atraumatic, Normocephalic  EYES: EOMI, PERRLA, conjunctiva and sclera clear  ENT: Moist mucous membranes  NECK: Supple, No JVD  CHEST/LUNG: Clear to auscultation bilaterally; No rales, rhonchi, wheezing, or rubs. Unlabored respirations  HEART: Regular rate and rhythm; No murmurs, rubs, or gallops  ABDOMEN: Bowel sounds present; Soft, Nontender, Nondistended. No hepatomegaly  EXTREMITIES:  2+ Peripheral Pulses, brisk capillary refill. No clubbing, cyanosis, or edema  NERVOUS SYSTEM:  Alert & Oriented X3, speech clear. No deficits   MSK: FROM all 4 extremities, full and equal strength  SKIN: No rashes or lesions   Epi Huntley MD  Internal Medicine, PGY3    MICU Progress Note    CHIEF COMPLAINT: Pt is a 89 yo M with h/o CAD, CABG, s/p stents, s/p PPM placement, HTN, HLD, DM, CKD, CVA x3, s/p PEG tube for dysphagia and acute cholecystitis s/p cholecystostomy ( not surgical candidate, s/p cholecystomy tube removal) initially presented/admitted on 8/7 2 to MS change; found to have UTI with sepsis and acute on CKD s/p PEA cardiac arrest 8/8 likely 2/2 AHRF 2/2 aspiration s/p ROSC (after 13 mins), now transferred to [facility] for MRI Brain to assess prognostication iso concern for anoxic brain injury.    Interval Events:  - Received Dilaudid at 10PMand 2AM overnight     Meds administered:  chlorhexidine 0.12% Liquid: 15 milliLiter(s) Oral Mucosa (08-22 @ 06:18)  petrolatum Ophthalmic Ointment: 1 Application(s) Both EYES (08-22 @ 06:16)  modafinil: 100 milliGRAM(s) Oral (08-22 @ 05:32)  insulin lispro (ADMELOG) corrective regimen sliding scale: 2 Unit(s) SubCutaneous (08-22 @ 05:32)  zinc oxide 40% Paste: 1 Application(s) Topical (08-22 @ 05:15)  ticagrelor: 60 milliGRAM(s) Oral (08-22 @ 05:15)  heparin   Injectable: 5000 Unit(s) SubCutaneous (08-22 @ 05:15)  valproic  acid Syrup: 500 milliGRAM(s) Oral (08-22 @ 05:14)  midodrine: 10 milliGRAM(s) Oral (08-22 @ 05:14)  levETIRAcetam  Solution: 500 milliGRAM(s) Oral (08-22 @ 05:14)  albuterol/ipratropium for Nebulization: 3 milliLiter(s) Nebulizer (08-22 @ 04:55)  sodium chloride 3%  Inhalation: 4 milliLiter(s) Inhalation (08-22 @ 04:50)  HYDROmorphone  Injectable: 0.5 milliGRAM(s) IV Push (08-22 @ 02:28)  (ADM OVERRIDE): 1 Each &lt;See Task&gt; (08-22 @ 02:25)  insulin lispro (ADMELOG) corrective regimen sliding scale: 2 Unit(s) SubCutaneous (08-21 @ 23:23)  HYDROmorphone  Injectable: 0.5 milliGRAM(s) IV Push (08-21 @ 22:15)  (ADM OVERRIDE): 1 Each &lt;See Task&gt; (08-21 @ 22:12)  senna Syrup: 10 milliLiter(s) Oral (08-21 @ 21:45)  midodrine: 10 milliGRAM(s) Oral (08-21 @ 21:45)  heparin   Injectable: 5000 Unit(s) SubCutaneous (08-21 @ 21:45)  doxazosin: 2 milliGRAM(s) Oral (08-21 @ 21:45)  sodium chloride 3%  Inhalation: 4 milliLiter(s) Inhalation (08-21 @ 21:01)  albuterol/ipratropium for Nebulization: 3 milliLiter(s) Nebulizer (08-21 @ 21:01)        OBJECTIVE:  Vitals:  T(F): 99 (08-22-24 @ 07:00), Max: 100.2 (08-21-24 @ 08:00)  HR: 87 (08-22-24 @ 07:00) (87 - 124)  BP: 114/48 (08-22-24 @ 07:00) (100/57 - 124/62)  BP(mean): 68 (08-22-24 @ 07:00) (60 - 78)  ABP: --  ABP(mean): --  RR: 12 (08-22-24 @ 07:00) (11 - 23)  SpO2: 100% (08-22-24 @ 07:00) (94% - 100%)  CVP(mm Hg): --  I/O Detail 24H    21 Aug 2024 07:01  -  22 Aug 2024 07:00  --------------------------------------------------------  IN:    Enteral Tube Flush: 290 mL    Glucerna 1.5: 480 mL  Total IN: 770 mL    OUT:    Indwelling Catheter - Urethral (mL): 2025 mL  Total OUT: 2025 mL    Total NET: -1255 mL          HOSPITAL MEDICATIONS:  Standing Meds:  albuterol/ipratropium for Nebulization 3 milliLiter(s) Nebulizer every 6 hours  aspirin  chewable 81 milliGRAM(s) Oral daily  chlorhexidine 0.12% Liquid 15 milliLiter(s) Oral Mucosa two times a day  chlorhexidine 2% Cloths 1 Application(s) Topical daily  dextrose 5%. 1000 milliLiter(s) IV Continuous <Continuous>  dextrose 5%. 1000 milliLiter(s) IV Continuous <Continuous>  dextrose 50% Injectable 25 Gram(s) IV Push once  dextrose 50% Injectable 25 Gram(s) IV Push once  dextrose Oral Gel 15 Gram(s) Oral once  doxazosin 2 milliGRAM(s) Oral at bedtime  escitalopram 10 milliGRAM(s) Oral daily  glucagon  Injectable 1 milliGRAM(s) IntraMuscular once  heparin   Injectable 5000 Unit(s) SubCutaneous every 8 hours  insulin lispro (ADMELOG) corrective regimen sliding scale   SubCutaneous every 6 hours  insulin NPH human recombinant 7 Unit(s) SubCutaneous every 6 hours  levETIRAcetam  Solution 500 milliGRAM(s) Oral two times a day  midodrine 10 milliGRAM(s) Oral every 8 hours  modafinil 100 milliGRAM(s) Oral <User Schedule>  pantoprazole   Suspension 40 milliGRAM(s) Oral daily  petrolatum Ophthalmic Ointment 1 Application(s) Both EYES two times a day  polyethylene glycol 3350 17 Gram(s) Oral daily  senna Syrup 10 milliLiter(s) Oral at bedtime  sodium chloride 3%  Inhalation 4 milliLiter(s) Inhalation every 6 hours  ticagrelor 60 milliGRAM(s) Oral every 12 hours  valproic  acid Syrup 500 milliGRAM(s) Oral two times a day  zinc oxide 40% Paste 1 Application(s) Topical two times a day      PRN Meds:      PHYSICAL EXAM:  GENERAL: intubated  HEAD:  Atraumatic, Normocephalic  EYES: EOMI, PERRLA, conjunctiva and sclera clear  ENT: Moist mucous membranes  NECK: Supple, No JVD  CHEST/LUNG: Clear to auscultation bilaterally; No rales, rhonchi, wheezing, or rubs. Unlabored respirations  HEART: Regular rate and rhythm; No murmurs, rubs, or gallops  ABDOMEN: Bowel sounds present; Soft, Nontender, Nondistended. No hepatomegaly  EXTREMITIES:  2+ Peripheral Pulses, brisk capillary refill. Generalized anasarca  NERVOUS SYSTEM:  Alert & Oriented X0; withdraws slightly to pain  MSK: FROM all 4 extremities, full and equal strength  SKIN: No rashes or lesions

## 2024-08-22 NOTE — PROCEDURE NOTE - NSBRONCHPROCDETAILS_GEN_A_CORE_FT
Patient noted to only have intermittent volumes on mechanical ventilator. Bronchoscope emergently inserted through ETT. ETT noted to be in subglottic region. ETT advanced to proper location above fox under bronchoscopic visualization. Airway evaluation revealed diffuse thick secretions which were therapeutically aspirated.  Bronchoscope then withdrawn from ETT.

## 2024-08-22 NOTE — PROGRESS NOTE ADULT - ASSESSMENT
Pt is a 89 yo M with h/o CAD, CABG, s/p stents, s/p PPM placement, HTN, HLD, DM, CKD, CVA x3, s/p PEG tube for dysphagia and acute cholecystitis s/p cholecystostomy ( not surgical candidate, s/p cholecystomy tube removal) initially presented/admitted on 8/7 2 to MS change; found to have UTI with sepsis and acute on CKD s/p PEA cardiac arrest 8/8 likely 2/2 AHRF 2/2 aspiration s/p ROSC (after 13 mins), now transferred to [facility] for MRI Brain to assess prognostication iso concern for anoxic brain injury.      NEURO:  #Mental status: AAOx0. Baseline AAOx1. Likely 2/2 anoxic encephalopathy .  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  - on modafinil 100mg BID as per family's request to stimulate mental status. Started on 8/17/24    CV:  #HD stable  - No pressor requirements   - Midodrine 10mg q8  - plan for Lasix 40mg IV x1 8/20 for anasarca  - plan for Lasix 40mg IV x1 8/21 for anasarca    #PEA arrest   - due to aspiration 8/7    #CHF EF 45%  - GDMT currently held    #HTN/HLD:   - Not on any home medications     # h/o ppm  - EP consult in AM for interrogation so patient can get MRI    PULM:  #Intubated 2/2 aspiration pna  - CPAP 5/5, triggering spontaneously  - Duonebs/Nebulized 3% NS for pulmonary toiletining   - Chest IPV     RENAL:  #MABLE:   -Trend I/Os and daily weights, and Cr. 1.5 --> 1.9    GI:  #PEG site leaking  - Resolved, no active leak  #Diet: PEG feeds resumed    - Pantpoprozole for GI prophylaxis   - Bowel reg w senna/miralax       ENDO:  #DM2: HbA1c 7.7%   - Insulin Sliding Scale  - Insulin Glargine 17U   - ISS    HEMATOLOGIC:  #Anemia of chronic disease  - s/p 1 unit PRBC 8/16   #Coag panel shows  #DVT prophylaxis with lovenox    # RUE + RLE Swelling  - DVT study ordered     ID:  #ASA PNA   - Sputum cx + pseudomonas, pansensitive   - extended infusion high dose zosyn 4.5g q8hrs 8/8-8/19  - Zosyn stopped 8/19  - BCx ordered today 8/19  - Monitor for signs of infection     Psych:  #Depression  - Continue home medication lexapro     Ethics:  - DNR/intubate  - family discussing about palliative extubation and transition to comfort care    Dispo:   - MICU  - family discussing about palliative extubation and transition to comfort care Pt is a 89 yo M with h/o CAD, CABG, s/p stents, s/p PPM placement, HTN, HLD, DM, CKD, CVA x3, s/p PEG tube for dysphagia and acute cholecystitis s/p cholecystostomy ( not surgical candidate, s/p cholecystomy tube removal) initially presented/admitted on 8/7 2 to MS change; found to have UTI with sepsis and acute on CKD s/p PEA cardiac arrest 8/8 likely 2/2 AHRF 2/2 aspiration s/p ROSC (after 13 mins), now transferred to [facility] for MRI Brain to assess prognostication iso concern for anoxic brain injury.      NEURO:  #Mental status: AAOx0. Baseline AAOx1. Likely 2/2 anoxic encephalopathy .  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  - on modafinil 100mg BID as per family's request to stimulate mental status. Started on 8/17/24  - Neuro consulted for prognostication per family request    CV:  #HD stable  - No pressor requirements   - Midodrine 10mg q8  - plan for Lasix 40mg IV x1 8/20 for anasarca  - plan for Lasix 40mg IV x1 8/21 for anasarca    #PEA arrest   - due to aspiration 8/7    #CHF EF 45%  - GDMT currently held    #HTN/HLD:   - Not on any home medications     # h/o ppm  - EP consult in AM for interrogation so patient can get MRI    PULM:  #Intubated 2/2 aspiration pna  - CPAP 5/5, triggering spontaneously  - Duonebs/Nebulized 3% NS for pulmonary toiletining   - Chest IPV     RENAL:  #MABLE:   -Trend I/Os and daily weights, and Cr. 1.5 --> 1.9    GI:  #PEG site leaking  - Resolved, no active leak  #Diet: PEG feeds resumed    - Pantpoprozole for GI prophylaxis   - Bowel reg w senna/miralax       ENDO:  #DM2: HbA1c 7.7%   - Insulin Sliding Scale  - Insulin Glargine 17U   - ISS    HEMATOLOGIC:  #Anemia of chronic disease  - s/p 1 unit PRBC 8/16   #Coag panel shows  #DVT prophylaxis with lovenox    # RUE + RLE Swelling  - DVT study ordered     ID:  #ASA PNA   - Sputum cx + pseudomonas, pansensitive   - extended infusion high dose zosyn 4.5g q8hrs 8/8-8/19  - Zosyn stopped 8/19  - BCx ordered today 8/19  - Monitor for signs of infection     Psych:  #Depression  - Continue home medication lexapro     Ethics:  - DNR/intubate  - family discussing about palliative extubation and transition to comfort care    Dispo:   - MICU  - family discussing about palliative extubation and transition to comfort care

## 2024-08-22 NOTE — PROGRESS NOTE ADULT - ASSESSMENT
89 yo M with h/o CAD, CABG, s/p stents, s/p PPM placement, HTN, HLD, DM, CKD, CVA x3, s/p PEG tube for dysphagia and acute cholecystitis s/p cholecystostomy ( not surgical candidate, s/p cholecystomy tube removal) initially presented/admitted on 8/7 2 to MS change; found to have UTI with sepsis and acute on CKD s/p PEA cardiac arrest 8/8 likely 2/2 AHRF 2/2 aspiration s/p ROSC (after 13 mins), now transferred to [facility] for MRI Brain to assess prognostication iso concern for anoxic brain injury.  Palliative Care consulted for complex decision making related to goals of care discussions.

## 2024-08-22 NOTE — PROGRESS NOTE ADULT - PROBLEM SELECTOR PLAN 2
MRI with anoxic brain injury following cardiac arrest  - MRI Brain 8/20 - Cortical restricted diffusion and patchy enhancement in the bilateral posterior frontal, parietal and occipital lobes compatible with hypoxic ischemic encephalopathy.  - Neurology recommendations appreciated - " poor overall prognosis of meaningful recovery"  - management per MICU team. MRI with anoxic brain injury following cardiac arrest  - MRI Brain 8/20 - Cortical restricted diffusion and patchy enhancement in the bilateral posterior frontal, parietal and occipital lobes compatible with hypoxic ischemic encephalopathy.  - Neurology recommendations appreciated - " poor overall prognosis of meaningful recovery". F/u formal neurology recommendations  - management per MICU team.

## 2024-08-22 NOTE — CONSULT NOTE ADULT - SUBJECTIVE AND OBJECTIVE BOX
Neurology - Consult Note    -  Spectra: [id number] ([facility]), [phone] ([facility])  -    HPI: Patient SHAIK CHARANJIT is a 89y (05-Oct-1933) with a PMHx significant for CAD, CABG, s/p stents, s/p PPM placement, HTN, HLD, DM, CKD, CVA x3, s/p PEG tube for dysphagia and acute cholecystitis s/p cholecystostomy (not surgical candidate, s/p cholecystomy tube removal), history of myoclonus on valproate at home, initially presented/admitted on 8/7 at [place] for MS change; found to have UTI with sepsis and acute on CKD had PEA cardiac arrest 8/8 likely 2/2 AHRF 2/2 aspiration s/p ROSC (after 13-20 mins), now transferred to [facility] for MRI Brain to assess prognostication iso concern for anoxic brain injury. Currently on Keppra 500 BID, Valproate 500 BID(vs 250 TID at home). Off sedation >4 days now.    EEG 8/9 showed intermittent subcortical myoclonus, sever cerebral dysfunction but just severe cerebral dysfunction in all EEG since  NSE 8/11 46.2  MRI 8/20 showed cortical restricted diffusion and patchy enhancement in b/l frontal, parietal, occipital lobes indicating hypoxic ischemic injury  Free VPA levels 8/10 was 28.9(high), Serum Valproate levels 8/11 60 (albumin 2.1), unclear if trough    All other review of systems is negative unless indicated above.    Allergies:  Cipro (Unknown)  fluoroquinolone antibiotics (Other)  carbamazepine (Other)  Tegretol (Unknown)      PMHx/PSHx/Family Hx: As above, otherwise see below   Hyperlipemia    Hypertension    Coronary Artery Disease    Diabetes Mellitus Type II    Stented Coronary Artery    Neuropathy    Myocardial infarction    Stroke    Myoclonic jerking    Stage 3 chronic kidney disease        Social Hx:  No current use of tobacco, alcohol, or illicit drugs    Medications:  MEDICATIONS  (STANDING):  albuterol/ipratropium for Nebulization 3 milliLiter(s) Nebulizer every 6 hours  aspirin  chewable 81 milliGRAM(s) Oral daily  chlorhexidine 0.12% Liquid 15 milliLiter(s) Oral Mucosa two times a day  chlorhexidine 2% Cloths 1 Application(s) Topical daily  dextrose 5%. 1000 milliLiter(s) (100 mL/Hr) IV Continuous <Continuous>  dextrose 5%. 1000 milliLiter(s) (50 mL/Hr) IV Continuous <Continuous>  dextrose 50% Injectable 25 Gram(s) IV Push once  dextrose 50% Injectable 25 Gram(s) IV Push once  dextrose Oral Gel 15 Gram(s) Oral once  doxazosin 2 milliGRAM(s) Oral at bedtime  escitalopram 10 milliGRAM(s) Oral daily  glucagon  Injectable 1 milliGRAM(s) IntraMuscular once  heparin   Injectable 5000 Unit(s) SubCutaneous every 8 hours  insulin lispro (ADMELOG) corrective regimen sliding scale   SubCutaneous every 6 hours  insulin NPH human recombinant 7 Unit(s) SubCutaneous every 6 hours  levETIRAcetam  Solution 500 milliGRAM(s) Oral two times a day  midodrine 10 milliGRAM(s) Oral every 8 hours  modafinil 100 milliGRAM(s) Oral <User Schedule>  pantoprazole   Suspension 40 milliGRAM(s) Oral daily  petrolatum Ophthalmic Ointment 1 Application(s) Both EYES two times a day  polyethylene glycol 3350 17 Gram(s) Oral daily  senna Syrup 10 milliLiter(s) Oral at bedtime  sodium chloride 3%  Inhalation 4 milliLiter(s) Inhalation every 6 hours  ticagrelor 60 milliGRAM(s) Oral every 12 hours  tobramycin for Nebulization 300 milliGRAM(s) Inhalation every 12 hours  valproic  acid Syrup 500 milliGRAM(s) Oral two times a day  zinc oxide 40% Paste 1 Application(s) Topical two times a day    MEDICATIONS  (PRN):      --------------------------------------------------------------------------------------------------------------------------------------------------------------------------------------------------------------------    Vitals:  T(C): 37.5 (08-22-24 @ 16:00), Max: 37.6 (08-21-24 @ 20:00)  HR: 115 (08-22-24 @ 18:00) (86 - 115)  BP: 125/64 (08-22-24 @ 18:00) (102/50 - 136/67)  RR: 15 (08-22-24 @ 18:00) (11 - 17)  SpO2: 100% (08-22-24 @ 18:00) (96% - 100%)    PHYSICAL EXAM:     General - intubated, not sedated for >4 days  Cardiovascular - Peripheral pulses palpable, no edema    NEURO:    Mental status - lethargic, mild grimace to noxious stimuli but no eye opening, intermittent eye blinking and b/l leg movement.     Cranial nerves -   PERRL, no blink to threat b/l, EOM absent but mild left extropia  corneal reflex absent  oculocephalic reflex absent  cough reflex present but gag reflex absent    Motor - decreased tone throughout  no purposeful movements, withdraws to pain in b/l LE    Sensation - withdraws to pain in b/l LE, doesn't withdraw to pain in UE    DTR's -             Biceps      Triceps     Brachioradialis      Patellar    Ankle    Toes/plantar response  R             0+             0+                  0+                       0+            0+                 up  L              0+             0+                 0+                        0+           0+                 up    Coordination - unable to assess    Gait and station - unable to assess    ---------------------------------------------------------------------------------------------------------------------------------------------------------------------------------------------------------------------------    Labs:                        7.3    7.83  )-----------( 312      ( 22 Aug 2024 01:10 )             23.3     08-22    142  |  107  |  39<H>  ----------------------------<  135<H>  4.2   |  26  |  1.47<H>    Ca    8.4      22 Aug 2024 00:30  Phos  3.0     08-22  Mg     2.10     08-22    TPro  6.9  /  Alb  2.0<L>  /  TBili  0.2  /  DBili  x   /  AST  23  /  ALT  10  /  AlkPhos  64  08-22    CAPILLARY BLOOD GLUCOSE      POCT Blood Glucose.: 235 mg/dL (22 Aug 2024 17:33)    LIVER FUNCTIONS - ( 22 Aug 2024 00:30 )  Alb: 2.0 g/dL / Pro: 6.9 g/dL / ALK PHOS: 64 U/L / ALT: 10 U/L / AST: 23 U/L / GGT: x             Culture - Sputum (collected 20 Aug 2024 15:24)  Source: .Sputum Sputum  Gram Stain (20 Aug 2024 22:57):    Few polymorphonuclear leukocytes per low power field    No Squamous epithelial cells per low power field    Rare Gram Negative Rods per oil power field  Final Report (22 Aug 2024 15:18):    Few Pseudomonas aeruginosa    Normal Respiratory Aurelia present  Organism: Pseudomonas aeruginosa (22 Aug 2024 15:18)  Organism: Pseudomonas aeruginosa (22 Aug 2024 15:18)      PT/INR - ( 21 Aug 2024 00:20 )   PT: 13.0 sec;   INR: 1.16 ratio         PTT - ( 21 Aug 2024 00:20 )  PTT:31.6 sec  CSF:                  Radiology:  MR Head w/wo IV Cont:  (20 Aug 2024 15:42)  FINDINGS:  Cortical restricted diffusion in the bilateral posterior frontal and   parietal lobes and medial occipital lobes with corresponding patchy   cortical enhancement consistent with acute to subacute ischemia. No   associated hemorrhage.    The ventricles, sulci and cisternal spaces are prominent and consistent   with generalize volume loss. There are mild patchy areas of T2   hyperintensity within the periventricular and subcortical white matter   which are non specific and may be related to chronic microvascular   ischemic changes.There is no evidence of mass or intracranial hemorrhage.   There is no extra axial collection. No midline shift or other significant   mass effect is noted.    There is normal flow-void within major cranial vessels suggestive of   their patency.    There has been prior bilateral cataract surgery. There is mild mucosal   thickening in the ethmoid air cells. Right maxillary polyp versus   retention cyst. Effusions in the bilateral mastoid air cells.    IMPRESSION:  Cortical restricted diffusion and patchy enhancement in the bilateral   posterior frontal, parietal and occipital lobes compatible with hypoxic   ischemic encephalopathy.  Additional chronic findings as above.

## 2024-08-22 NOTE — PROGRESS NOTE ADULT - ATTENDING COMMENTS
Patient was seen and evaluated with Dr. Infante, Geriatric Medicine Fellow, during bedside rounds.   Agree with above findings and recommendations, edited documentation as appropriate to reflect the plan of care discussed with patient and myself with the following additions:    89 yo M w/ CAD s/p CABG s/p stents (last stent 5/2022), s/p PPM, HTN, HLD, T2DM, CKD, PVD, prior CVA x3, Myoclonic Jerks/Seizures, history of acute cholecystitis s/p cholecysteostomy who was initially admitted to [facility] on 8/7 after p/w AMS. Patient found to have UTI with sepsis and acute on chronic kidney failure with course c/b PEA arrest x 13 min likely due to AHRF 2/2 aspiration, now with concern for anoxic encephalopathy, transferred to [facility] for MRI brain for prognostication.   Palliative Care consulted for complex decision making  related to goals of care discussions     Patient intubated. Jen Sesay and Ulises Sears  at bedside.  Jen share they wish for neurology consult to review MRI results and their prognostication prior to proceeding with next step. Ulises confirms that after patient is intubated, family would not want re-intubation. Emotional support provided    Case reviewed with primary team  Thank you for allowing us to participate in your patient's care. Please page [phone] for any questions/concerns.

## 2024-08-22 NOTE — CONSULT NOTE ADULT - ATTENDING COMMENTS
Exam:  MS: No eye opening.  No attempts at verbalizations.  No follow commands. No response to supraorbital pressure.  CN:  No BTT.  OD in primary gaze, OS abducted.  No roving eye movements.  OCR - with minimal EOM.  Corneal reflex is intact and symmetric.  Pupils round,  2mm-->1mm, B.  Gag - absent.   Motor/sensory:  Tone:  Flaccid in all 4 ext.   To nailbed pressure: arms with no response.  Legs with triple flexion which is the same exact movement that occurs with Chaddock Reflex.   Reflexes:  Absent except for knees 3, B.  B Babinski and Chaddock reflexes with triple flexion.      Previous EEGs reviewed  Neuron Specific Enolase: 46.2  MRI brain reviewed with neuroradiologist.     A/P  Mr. Foss is a 89 yo man with severe anoxic ischemic encephalopathy.   Given the history, neurological examination (off sedation for at least 8/18), NSE, EEG, and MRI brain he has a very poor prognosis for meaningful neurological functional recovery.  Epileptologist recommended repeat 2 hour EEG after giving modafinil  Adjust VPA does after checking trough  Continue levetiracetam 500 mg BID.   D/W multiple family members.  Thank you
Patient was seen and evaluated with Dr. Infante, Geriatric Medicine Fellow, during bedside rounds.   Agree with above findings and recommendations, edited documentation as appropriate to reflect the plan of care discussed with patient and myself with the following additions:    89 yo M w/ CAD s/p CABG s/p stents (last stent 5/2022), s/p PPM, HTN, HLD, T2DM, CKD, PVD, prior CVA x3, Myoclonic Jerks/Seizures, history of acute cholecystitis s/p cholecysteostomy who was initially admitted to [facility] on 8/7 after p/w AMS. Patient found to have UTI with sepsis and acute on chronic kidney failure with course c/b PEA arrest x 13 min likely due to AHRF 2/2 aspiration, now with concern for anoxic encephalopathy, transferred to [facility] for MRI brain for prognostication.   Palliative Care consulted for complex decision making  related to goals of care discussions     Patient intubated on CPAP trials during encounter. Patient with encephalopathy despite being off sedatives.     Family verbalized understanding that patient with anoxic encephalopathy s/p cardiac arrest likely due to aspiration. Family aware of poor prognosis.   Counseled family on next step options including allowing for natural death in event of another cardiac arrest, liberation from mechanical ventilation, and transition to comfort centric approach of care with no further diagnostic workup.   Family in agreement for DNR with plans to further discuss amongst themselves on date/time for palliative extubation (with no reintubation afterwards). They wish to continue medical management at this time and likely would transition to comfort measures only after palliative extubation. Family share importance of continued tube feeds for patient to have nutrition. Counseled that tube feeds via PEG does not decrease risk of aspiration and ongoing feeds unlikely to improve his prognosis.   Addressed their questions about hospice. Reviewed would continue to reassess patient's symptoms management requirements and stability for further hospice disposition planning if clinical status permits.     MOLST completed for DNR Only.   Family to decide on date/time for palliative extubation  Continue medical management and tube feeds in the interim   16 minutes spent on advanced care planning/ goals of care    Case reviewed with primary team   Thank you for allowing us to participate in your patient's care. Please page [phone] for any questions/concerns.

## 2024-08-22 NOTE — PROGRESS NOTE ADULT - SUBJECTIVE AND OBJECTIVE BOX
Date of Service 08-21-24 @ 12:11     Reason for Consultation:	[] Pain		[x] Goals of Care		[] Non-pain symptoms    [] End of life discussion		[] Other:    HPI:  89 yo M with h/o CAD, CABG, s/p stents, s/p PPM placement, HTN, HLD, DM, CKD, CVA x3, s/p PEG tube for dysphagia and acute cholecystitis s/p cholecystostomy ( not surgical candidate, s/p cholecystomy tube removal) initially presented/admitted on 8/7 2 to MS change; found to have UTI with sepsis and acute on CKD s/p PEA cardiac arrest 8/8 likely 2/2 AHRF 2/2 aspiration s/p ROSC (after 13 mins), now transferred to [facility] for MRI Brain to assess prognostication iso concern for anoxic brain injury.    Interval History:   Palliative care consulted for goals of care in setting of anoxic brain injury seen on MRI.     PERTINENT PM/SXH:   Hyperlipemia  Hypertension  Coronary Artery Disease  Diabetes Mellitus Type II  Stented Coronary Artery  Neuropathy  Myocardial infarction  Stroke  Myoclonic jerking  Stage 3 chronic kidney disease  History of Cataract Extraction  Hx of CABG  H/O coronary angiogram  S/P coronary artery stent placement  S/P placement of cardiac pacemaker    FAMILY HISTORY: unable to obtain due to encephalopathy     ITEMS NOT CHECKED ARE NOT PRESENT    SOCIAL HISTORY:   Significant other/partner[ ]  Children[x ]  Jehovah's witness/Spirituality:  Substance hx:  [ ]   Tobacco hx:  [ ]   Alcohol hx: [ ]   Home Opioid hx:  [ ] I-Stop Reference No: [id number]  Prescription Information      PDI Filter:    PDI	Current Rx	Drug Type	Rx Written	Rx Dispensed	Drug	Quantity	Days Supply	Prescriber Name	Prescriber JAVIER #	Payment Method	Dispenser  A	N	B	05/10/2024	05/10/2024	clonazepam 0.5 mg tablet	10	20	Salvador Maldonado J	[id number]	[facility]  A	N	B	04/29/2024	04/29/2024	clonazepam 0.5 mg tablet	7	14	Salvador Maldonado J	[id number]	[place]	[facility]  A	N	B	04/08/2024	04/08/2024	clonazepam 0.5 mg tablet	10	20	Salvador Maldonado J	[id number]	[facility]  A	N	B	04/03/2024	04/03/2024	clonazepam 0.5 mg tablet	15	30	Salvador Maldonado J	[id number]	Medicare	[facility]  A	N	B	03/28/2024	03/28/2024	lorazepam 0.5 mg tablet	15	15	Salvador Maldonado J	[id number]	Medicare	[facility]  A	N	B	03/27/2024	03/27/2024	lorazepam 0.5 mg tablet	7	14	Salvador Maldonado J	[id number]	Medicare	[facility]  Living Situation: [x ]Home  [ ]Long term care  [ ]Rehab [ ]Other      ADVANCE DIRECTIVES:    MOLST  [ ]  Living Will  [ ]   DECISION MAKER(s):  [ ] Health Care Proxy(s)  [ x] Surrogate(s)  [ ] Guardian           Name(s): Phone Number(s):  Shaik Lázaro: [phone]    BASELINE (I)ADL(s) (prior to admission):  [place]: [ ]Total  [ ] Moderate [x ]Dependent    Allergies    Cipro (Unknown)  fluoroquinolone antibiotics (Other)  carbamazepine (Other)  Tegretol (Unknown)    Intolerances    MEDICATIONS  (STANDING):  albuterol/ipratropium for Nebulization 3 milliLiter(s) Nebulizer every 6 hours  aspirin  chewable 81 milliGRAM(s) Oral daily  chlorhexidine 0.12% Liquid 15 milliLiter(s) Oral Mucosa two times a day  chlorhexidine 2% Cloths 1 Application(s) Topical daily  dextrose 5%. 1000 milliLiter(s) (50 mL/Hr) IV Continuous <Continuous>  dextrose 5%. 1000 milliLiter(s) (100 mL/Hr) IV Continuous <Continuous>  dextrose 50% Injectable 25 Gram(s) IV Push once  dextrose 50% Injectable 25 Gram(s) IV Push once  dextrose Oral Gel 15 Gram(s) Oral once  doxazosin 2 milliGRAM(s) Oral at bedtime  escitalopram 10 milliGRAM(s) Oral daily  glucagon  Injectable 1 milliGRAM(s) IntraMuscular once  heparin   Injectable 5000 Unit(s) SubCutaneous every 8 hours  insulin lispro (ADMELOG) corrective regimen sliding scale   SubCutaneous every 6 hours  insulin NPH human recombinant 7 Unit(s) SubCutaneous every 6 hours  levETIRAcetam  Solution 500 milliGRAM(s) Oral two times a day  midodrine 10 milliGRAM(s) Oral every 8 hours  modafinil 100 milliGRAM(s) Oral <User Schedule>  pantoprazole   Suspension 40 milliGRAM(s) Oral daily  petrolatum Ophthalmic Ointment 1 Application(s) Both EYES two times a day  polyethylene glycol 3350 17 Gram(s) Oral daily  senna Syrup 10 milliLiter(s) Oral at bedtime  sodium chloride 3%  Inhalation 4 milliLiter(s) Inhalation every 6 hours  ticagrelor 60 milliGRAM(s) Oral every 12 hours  valproic  acid Syrup 500 milliGRAM(s) Oral two times a day  zinc oxide 40% Paste 1 Application(s) Topical two times a day    MEDICATIONS  (PRN):        ITEMS UNCHECKED ARE NOT PRESENT     PRESENT SYMPTOMS: [x ]Unable to self-report due to altered mental status  [ ] CPOT [x ] PAINADs [x ] RDOS  Source if other than patient:  [ ]Family   [ ]Team     Pain: [ ]yes [ ]no  QOL impact -   Location -                    Aggravating factors -  Quality -  Radiation -  Timing-  Severity (0-10 scale):  Minimal acceptable level / Pain goal (0-10 scale):     CPOT:    https://www.[facility].org/getattachment/[email or web address]/Critical-Care-Pain-Observation-Tool-(CPOT)    Dyspnea:                           [ ]Mild [ ]Moderate [ ]Severe  Anxiety:                             [ ]Mild [ ]Moderate [ ]Severe  Agitation:                          [ ]Mild [ ]Moderate [ ]Severe  Fatigue:                             [ ]Mild [ ]Moderate [ ]Severe  Nausea:                             [ ]Mild [ ]Moderate [ ]Severe  Loss of appetite:              [ ]Mild [ ]Moderate [ ]Severe  Constipation:                   [ ]Mild [ ]Moderate [ ]Severe  Diarrhea:                          [ ]Mild [ ]Moderate [ ]Severe      PCSSQ[Palliative Care Spiritual Screening Question]   Severity (0-10):  Score of 4 or > indicate consideration of Chaplaincy referral.  Chaplaincy Referral: [ ] yes [x ] declined[ ] following [ ] deferred    Caregiver [place]? : [ ] yes [ ] no [ ] Declined   [x ] Deferred            Social work referral [ ] Patient & Family Centered Care Referral [ ]     Anticipatory Grief present?:  [ ] yes [ ] no  [x ] Deferred                  Social work referral [ ] Chaplaincy Referral[ ]    Other Symptoms:  [ ]All other review of systems negative   [x ] Unable to obtain due to poor mentation     PHYSICAL EXAM:  Vital Signs Last 24 Hrs  T(C): 37.9 (21 Aug 2024 08:00), Max: 37.9 (21 Aug 2024 08:00)  T(F): 100.2 (21 Aug 2024 08:00), Max: 100.2 (21 Aug 2024 08:00)  HR: 94 (21 Aug 2024 11:30) (89 - 124)  BP: 117/65 (21 Aug 2024 11:00) (107/38 - 127/60)  BP(mean): 78 (21 Aug 2024 11:00) (58 - 81)  RR: 22 (21 Aug 2024 11:00) (14 - 22)  SpO2: 100% (21 Aug 2024 11:30) (97% - 100%)    Parameters below as of 21 Aug 2024 11:00  Patient On (Oxygen Delivery Method): ventilator    O2 Concentration (%): 30   Mode: CPAP with PS  FiO2: 30  PEEP: 5  PS: 5    I&O's Summary    20 Aug 2024 07:01  -  21 Aug 2024 07:00  --------------------------------------------------------  IN: 690 mL / OUT: 1940 mL / NET: -1250 mL    21 Aug 2024 07:01  -  21 Aug 2024 12:11  --------------------------------------------------------  IN: 90 mL / OUT: 150 mL / NET: -60 mL        GENERAL:  [ ]Alert  [ ]Oriented x   [ ]Lethargic  [ ]Cachexia  [x ]Unarousable  [ ]Verbal  [x ]Non-Verbal  [ ] No Distress  Behavioral:   [ ] Anxiety  [ ] Delirium [ ] Agitation [ ] Calm  [x ] Other-encephalopathy   HEENT:  [ ]Normal  [ ] Temporal Wasting  [ ]Dry mouth   [x ]ET Tube/Trach  [ ]Oral lesions  [ ] Mucositis  PULMONARY:   [ ]Clear [ ]Tachypnea  [ ]Audible excessive secretions   [x ]Rhonchi        [ ]Right [ ]Left [ ]Bilateral  [ ]Crackles        [ ]Right [ ]Left [ ]Bilateral  [ ]Wheezing     [ ]Right [ ]Left [ ]Bilateral  [ ]Diminished breath sounds [ ]right [ ]left [ ]bilateral  CARDIOVASCULAR:    [x ]Regular [ ]Irregular [ ]Tachy  [ ]Chacho [ ]Murmur [ ]Other  GASTROINTESTINAL:  [x ]Soft  [ ]Distended   [ ]+BS  [x ]Non tender [ ]Tender  [ x]PEG [ ]OGT/ NGT  Last BM: 8/21  GENITOURINARY:  [ ]Normal [ ] Incontinent   [ ]Oliguria/Anuria   [x ]Moss  MUSCULOSKELETAL:   [ ]Normal   [ ]Weakness  [x ]Bed/Wheelchair bound [ ]Edema  [  ] amputation  [  ] contraction  NEUROLOGIC:   [ ]No focal deficits  [x ]Cognitive impairment  [ ]Dysphagia [ ]Dysarthria [ ]Paresis [x ]Other - AAOx0, does not withdraw to painful stimuli, pupils equal round reactive to light  SKIN: See Nursing Skin Assessment for further details  [ ]Normal    [ ]Rash  [ ]Pressure ulcer(s)       Present on admission [ ]y [ ]n   [  ]  Wound    [  ] hyperpigmentation    CRITICAL CARE:  [ ] Shock Present  [ ]Septic [ ]Cardiogenic [ ]Neurologic [ ]Hypovolemic  [ ]  Vasopressors [ ]  Inotropes   [x ]Respiratory failure present [x ]Mechanical ventilation [ ]Non-invasive ventilatory support [ ]High flow  Mode: CPAP with PS, FiO2: 30, PEEP: 5, PS: 5  [x ]Acute  [ ]Chronic [x ]Hypoxic  [ ]Hypercarbic [ ]Other  [ ]Other organ failure     LABS:  reviewed                         7.0    10.95 )-----------( 287      ( 21 Aug 2024 00:20 )             21.8   08-21    143  |  105  |  43<H>  ----------------------------<  127<H>  3.8   |  24  |  1.54<H>    Ca    8.3<L>      21 Aug 2024 00:20  Phos  3.0     08-21  Mg     2.10     08-21    TPro  6.9  /  Alb  1.9<L>  /  TBili  0.2  /  DBili  x   /  AST  22  /  ALT  10  /  AlkPhos  63  08-21  PT/INR - ( 21 Aug 2024 00:20 )   PT: 13.0 sec;   INR: 1.16 ratio         PTT - ( 21 Aug 2024 00:20 )  PTT:31.6 sec  Urinalysis Basic - ( 21 Aug 2024 00:20 )    Color: x / Appearance: x / SG: x / pH: x  Gluc: 127 mg/dL / Ketone: x  / Bili: x / Urobili: x   Blood: x / Protein: x / Nitrite: x   Leuk Esterase: x / RBC: x / WBC x   Sq Epi: x / Non Sq Epi: x / Bacteria: x      CAPILLARY BLOOD GLUCOSE      POCT Blood Glucose.: 147 mg/dL (21 Aug 2024 11:16)  POCT Blood Glucose.: 173 mg/dL (21 Aug 2024 05:16)  POCT Blood Glucose.: 124 mg/dL (21 Aug 2024 00:19)  POCT Blood Glucose.: 187 mg/dL (20 Aug 2024 18:08)      RADIOLOGY & ADDITIONAL STUDIES: reviewed     PROTEIN CALORIE MALNUTRITION PRESENT: [ ]mild [ ]moderate [ ]severe [ ]underweight [ ]morbid obesity  https://www.andeal.org/vault/2440/web/files/ONC/Table_Clinical%20Characteristics%20to%20Document%20Malnutrition-White%20JV%20et%20al%202012.pdf    Height (cm): 175.3 (08-18-24 @ 17:12), 175.3 (08-07-24 @ 00:29), 175.3 (07-21-24 @ 06:00)  Weight (kg): 79.7 (08-18-24 @ 17:12), 79.8 (08-17-24 @ 06:00), 74.7 (07-21-24 @ 06:00)  BMI (kg/m2): 25.9 (08-18-24 @ 17:12), 26 (08-17-24 @ 06:00), 24.3 (08-07-24 @ 00:29)    [x ]PPSV2 < or = to 30% [ ]significant weight loss  [ ]poor nutritional intake  [ ]anasarca [ x]Artificial Nutrition      REFERRALS:   [ ]Chaplaincy  [ ]Hospice  [ ]Child Life  [ ]Social Work  [ x]Case management [ ]Holistic Therapy      Date of Service 08-21-24 @ 12:11     Reason for Consultation:	[] Pain		[x] Goals of Care		[] Non-pain symptoms    [] End of life discussion		[] Other:    HPI:  89 yo M with h/o CAD, CABG, s/p stents, s/p PPM placement, HTN, HLD, DM, CKD, CVA x3, s/p PEG tube for dysphagia and acute cholecystitis s/p cholecystostomy ( not surgical candidate, s/p cholecystomy tube removal) initially presented/admitted on 8/7 2 to MS change; found to have UTI with sepsis and acute on CKD s/p PEA cardiac arrest 8/8 likely 2/2 AHRF 2/2 aspiration s/p ROSC (after 13 mins), now transferred to [facility] for MRI Brain to assess prognostication iso concern for anoxic brain injury.    Interval History:   Palliative care consulted for goals of care in setting of anoxic brain injury seen on MRI. Family requesting neurology consult for prognostication. Family maintains they do not want reintubation if patient fails extubation, and they do not want tracheostomy.     PERTINENT PM/SXH:   Hyperlipemia  Hypertension  Coronary Artery Disease  Diabetes Mellitus Type II  Stented Coronary Artery  Neuropathy  Myocardial infarction  Stroke  Myoclonic jerking  Stage 3 chronic kidney disease  History of Cataract Extraction  Hx of CABG  H/O coronary angiogram  S/P coronary artery stent placement  S/P placement of cardiac pacemaker    FAMILY HISTORY: unable to obtain due to encephalopathy     ITEMS NOT CHECKED ARE NOT PRESENT    SOCIAL HISTORY:   Significant other/partner[ ]  Children[x ]  Judaism/Spirituality:  Substance hx:  [ ]   Tobacco hx:  [ ]   Alcohol hx: [ ]   Home Opioid hx:  [ ] I-Stop Reference No: [id number]  Prescription Information      PDI Filter:    PDI	Current Rx	Drug Type	Rx Written	Rx Dispensed	Drug	Quantity	Days Supply	Prescriber Name	Prescriber JAVIER #	Payment Method	Dispenser  A	N	B	05/10/2024	05/10/2024	clonazepam 0.5 mg tablet	10	20	Salvador Maldonado J	[id number]	[facility]  A	N	B	04/29/2024	04/29/2024	clonazepam 0.5 mg tablet	7	14	Salvador Maldonado J	[id number]	[place]  A	N	B	04/08/2024	04/08/2024	clonazepam 0.5 mg tablet	10	20	Salvador Maldonado J	[id number]	[facility]  A	N	B	04/03/2024	04/03/2024	clonazepam 0.5 mg tablet	15	30	Salvador Maldonado J	[id number]	Medicare	[facility]  A	N	B	03/28/2024	03/28/2024	lorazepam 0.5 mg tablet	15	15	Salvador Maldonado J	[id number]	Medicare	[facility]  A	N	B	03/27/2024	03/27/2024	lorazepam 0.5 mg tablet	7	14	Salvador Maldonado J	[id number]	Medicare	[facility]  Living Situation: [x ]Home  [ ]Long term care  [ ]Rehab [ ]Other      ADVANCE DIRECTIVES:    MOLST  [ ]  Living Will  [ ]   DECISION MAKER(s):  [ ] Health Care Proxy(s)  [ x] Surrogate(s)  [ ] Guardian           Name(s): Phone Number(s):  Shaik Lázaro: [phone]    BASELINE (I)ADL(s) (prior to admission):  [place]: [ ]Total  [ ] Moderate [x ]Dependent    Allergies    Cipro (Unknown)  fluoroquinolone antibiotics (Other)  carbamazepine (Other)  Tegretol (Unknown)    Intolerances    MEDICATIONS  (STANDING):  albuterol/ipratropium for Nebulization 3 milliLiter(s) Nebulizer every 6 hours  aspirin  chewable 81 milliGRAM(s) Oral daily  chlorhexidine 0.12% Liquid 15 milliLiter(s) Oral Mucosa two times a day  chlorhexidine 2% Cloths 1 Application(s) Topical daily  dextrose 5%. 1000 milliLiter(s) (50 mL/Hr) IV Continuous <Continuous>  dextrose 5%. 1000 milliLiter(s) (100 mL/Hr) IV Continuous <Continuous>  dextrose 50% Injectable 25 Gram(s) IV Push once  dextrose 50% Injectable 25 Gram(s) IV Push once  dextrose Oral Gel 15 Gram(s) Oral once  doxazosin 2 milliGRAM(s) Oral at bedtime  escitalopram 10 milliGRAM(s) Oral daily  glucagon  Injectable 1 milliGRAM(s) IntraMuscular once  heparin   Injectable 5000 Unit(s) SubCutaneous every 8 hours  insulin lispro (ADMELOG) corrective regimen sliding scale   SubCutaneous every 6 hours  insulin NPH human recombinant 7 Unit(s) SubCutaneous every 6 hours  levETIRAcetam  Solution 500 milliGRAM(s) Oral two times a day  midodrine 10 milliGRAM(s) Oral every 8 hours  modafinil 100 milliGRAM(s) Oral <User Schedule>  pantoprazole   Suspension 40 milliGRAM(s) Oral daily  petrolatum Ophthalmic Ointment 1 Application(s) Both EYES two times a day  polyethylene glycol 3350 17 Gram(s) Oral daily  senna Syrup 10 milliLiter(s) Oral at bedtime  sodium chloride 3%  Inhalation 4 milliLiter(s) Inhalation every 6 hours  ticagrelor 60 milliGRAM(s) Oral every 12 hours  valproic  acid Syrup 500 milliGRAM(s) Oral two times a day  zinc oxide 40% Paste 1 Application(s) Topical two times a day    MEDICATIONS  (PRN):        ITEMS UNCHECKED ARE NOT PRESENT     PRESENT SYMPTOMS: [x ]Unable to self-report due to altered mental status  [ ] CPOT [x ] PAINADs [x ] RDOS  Source if other than patient:  [ ]Family   [ ]Team     Pain: [ ]yes [ ]no  QOL impact -   Location -                    Aggravating factors -  Quality -  Radiation -  Timing-  Severity (0-10 scale):  Minimal acceptable level / Pain goal (0-10 scale):     CPOT:    https://www.[email or web address]/getattachment/[email or web address]/Critical-Care-Pain-Observation-Tool-(CPOT)    Dyspnea:                           [ ]Mild [ ]Moderate [ ]Severe  Anxiety:                             [ ]Mild [ ]Moderate [ ]Severe  Agitation:                          [ ]Mild [ ]Moderate [ ]Severe  Fatigue:                             [ ]Mild [ ]Moderate [ ]Severe  Nausea:                             [ ]Mild [ ]Moderate [ ]Severe  Loss of appetite:              [ ]Mild [ ]Moderate [ ]Severe  Constipation:                   [ ]Mild [ ]Moderate [ ]Severe  Diarrhea:                          [ ]Mild [ ]Moderate [ ]Severe      PCSSQ[Palliative Care Spiritual Screening Question]   Severity (0-10):  Score of 4 or > indicate consideration of Chaplaincy referral.  Chaplaincy Referral: [ ] yes [x ] declined[ ] following [ ] deferred    Caregiver [place]? : [ ] yes [ ] no [ ] Declined   [x ] Deferred            Social work referral [ ] Patient & Family Centered Care Referral [ ]     Anticipatory Grief present?:  [ ] yes [ ] no  [x ] Deferred                  Social work referral [ ] Chaplaincy Referral[ ]    Other Symptoms:  [ ]All other review of systems negative   [x ] Unable to obtain due to poor mentation     PHYSICAL EXAM:  Vital Signs Last 24 Hrs  T(C): 37.9 (21 Aug 2024 08:00), Max: 37.9 (21 Aug 2024 08:00)  T(F): 100.2 (21 Aug 2024 08:00), Max: 100.2 (21 Aug 2024 08:00)  HR: 94 (21 Aug 2024 11:30) (89 - 124)  BP: 117/65 (21 Aug 2024 11:00) (107/38 - 127/60)  BP(mean): 78 (21 Aug 2024 11:00) (58 - 81)  RR: 22 (21 Aug 2024 11:00) (14 - 22)  SpO2: 100% (21 Aug 2024 11:30) (97% - 100%)    Parameters below as of 21 Aug 2024 11:00  Patient On (Oxygen Delivery Method): ventilator    O2 Concentration (%): 30   Mode: CPAP with PS  FiO2: 30  PEEP: 5  PS: 5    I&O's Summary    20 Aug 2024 07:01  -  21 Aug 2024 07:00  --------------------------------------------------------  IN: 690 mL / OUT: 1940 mL / NET: -1250 mL    21 Aug 2024 07:01  -  21 Aug 2024 12:11  --------------------------------------------------------  IN: 90 mL / OUT: 150 mL / NET: -60 mL        GENERAL:  [ ]Alert  [ ]Oriented x   [ ]Lethargic  [ ]Cachexia  [x ]Unarousable  [ ]Verbal  [x ]Non-Verbal  [ ] No Distress  Behavioral:   [ ] Anxiety  [ ] Delirium [ ] Agitation [ ] Calm  [x ] Other-encephalopathy   HEENT:  [ ]Normal  [ ] Temporal Wasting  [ ]Dry mouth   [x ]ET Tube/Trach  [ ]Oral lesions  [ ] Mucositis  PULMONARY:   [ ]Clear [ ]Tachypnea  [ ]Audible excessive secretions   [x ]Rhonchi        [ ]Right [ ]Left [ ]Bilateral  [ ]Crackles        [ ]Right [ ]Left [ ]Bilateral  [ ]Wheezing     [ ]Right [ ]Left [ ]Bilateral  [ ]Diminished breath sounds [ ]right [ ]left [ ]bilateral  CARDIOVASCULAR:    [x ]Regular [ ]Irregular [ ]Tachy  [ ]Chacho [ ]Murmur [ ]Other  GASTROINTESTINAL:  [x ]Soft  [ ]Distended   [ ]+BS  [x ]Non tender [ ]Tender  [ x]PEG [ ]OGT/ NGT  Last BM: 8/21  GENITOURINARY:  [ ]Normal [ ] Incontinent   [ ]Oliguria/Anuria   [x ]Moss  MUSCULOSKELETAL:   [ ]Normal   [ ]Weakness  [x ]Bed/Wheelchair bound [ ]Edema  [  ] amputation  [  ] contraction  NEUROLOGIC:   [ ]No focal deficits  [x ]Cognitive impairment  [ ]Dysphagia [ ]Dysarthria [ ]Paresis [x ]Other - AAOx0, does not withdraw to painful stimuli, pupils equal round reactive to light  SKIN: See Nursing Skin Assessment for further details  [ ]Normal    [ ]Rash  [ ]Pressure ulcer(s)       Present on admission [ ]y [ ]n   [  ]  Wound    [  ] hyperpigmentation    CRITICAL CARE:  [ ] Shock Present  [ ]Septic [ ]Cardiogenic [ ]Neurologic [ ]Hypovolemic  [ ]  Vasopressors [ ]  Inotropes   [x ]Respiratory failure present [x ]Mechanical ventilation [ ]Non-invasive ventilatory support [ ]High flow  Mode: CPAP with PS, FiO2: 30, PEEP: 5, PS: 5  [x ]Acute  [ ]Chronic [x ]Hypoxic  [ ]Hypercarbic [ ]Other  [ ]Other organ failure     LABS:  reviewed                         7.0    10.95 )-----------( 287      ( 21 Aug 2024 00:20 )             21.8   08-21    143  |  105  |  43<H>  ----------------------------<  127<H>  3.8   |  24  |  1.54<H>    Ca    8.3<L>      21 Aug 2024 00:20  Phos  3.0     08-21  Mg     2.10     08-21    TPro  6.9  /  Alb  1.9<L>  /  TBili  0.2  /  DBili  x   /  AST  22  /  ALT  10  /  AlkPhos  63  08-21  PT/INR - ( 21 Aug 2024 00:20 )   PT: 13.0 sec;   INR: 1.16 ratio         PTT - ( 21 Aug 2024 00:20 )  PTT:31.6 sec  Urinalysis Basic - ( 21 Aug 2024 00:20 )    Color: x / Appearance: x / SG: x / pH: x  Gluc: 127 mg/dL / Ketone: x  / Bili: x / Urobili: x   Blood: x / Protein: x / Nitrite: x   Leuk Esterase: x / RBC: x / WBC x   Sq Epi: x / Non Sq Epi: x / Bacteria: x      CAPILLARY BLOOD GLUCOSE      POCT Blood Glucose.: 147 mg/dL (21 Aug 2024 11:16)  POCT Blood Glucose.: 173 mg/dL (21 Aug 2024 05:16)  POCT Blood Glucose.: 124 mg/dL (21 Aug 2024 00:19)  POCT Blood Glucose.: 187 mg/dL (20 Aug 2024 18:08)      RADIOLOGY & ADDITIONAL STUDIES: reviewed     PROTEIN CALORIE MALNUTRITION PRESENT: [ ]mild [ ]moderate [ ]severe [ ]underweight [ ]morbid obesity  https://www.andeal.org/vault/2440/web/files/ONC/Table_Clinical%20Characteristics%20to%20Document%20Malnutrition-White%20JV%20et%20al%202012.pdf    Height (cm): 175.3 (08-18-24 @ 17:12), 175.3 (08-07-24 @ 00:29), 175.3 (07-21-24 @ 06:00)  Weight (kg): 79.7 (08-18-24 @ 17:12), 79.8 (08-17-24 @ 06:00), 74.7 (07-21-24 @ 06:00)  BMI (kg/m2): 25.9 (08-18-24 @ 17:12), 26 (08-17-24 @ 06:00), 24.3 (08-07-24 @ 00:29)    [x ]PPSV2 < or = to 30% [ ]significant weight loss  [ ]poor nutritional intake  [ ]anasarca [ x]Artificial Nutrition      REFERRALS:   [ ]Chaplaincy  [ ]Hospice  [ ]Child Life  [ ]Social Work  [ x]Case management [ ]Holistic Therapy      Date of Service - 8/22/2024    Interval History:   Palliative care following for goals of care in setting of anoxic brain injury seen on MRI. Family requesting neurology consult for prognostication. Family maintains they do not want reintubation if patient fails extubation, and they do not want tracheostomy.     Allergies    Cipro (Unknown)  fluoroquinolone antibiotics (Other)  carbamazepine (Other)  Tegretol (Unknown)    Intolerances    MEDICATIONS  (STANDING):  albuterol/ipratropium for Nebulization 3 milliLiter(s) Nebulizer every 6 hours  aspirin  chewable 81 milliGRAM(s) Oral daily  chlorhexidine 0.12% Liquid 15 milliLiter(s) Oral Mucosa two times a day  chlorhexidine 2% Cloths 1 Application(s) Topical daily  dextrose 5%. 1000 milliLiter(s) (50 mL/Hr) IV Continuous <Continuous>  dextrose 5%. 1000 milliLiter(s) (100 mL/Hr) IV Continuous <Continuous>  dextrose 50% Injectable 25 Gram(s) IV Push once  dextrose 50% Injectable 25 Gram(s) IV Push once  dextrose Oral Gel 15 Gram(s) Oral once  doxazosin 2 milliGRAM(s) Oral at bedtime  escitalopram 10 milliGRAM(s) Oral daily  glucagon  Injectable 1 milliGRAM(s) IntraMuscular once  heparin   Injectable 5000 Unit(s) SubCutaneous every 8 hours  insulin lispro (ADMELOG) corrective regimen sliding scale   SubCutaneous every 6 hours  insulin NPH human recombinant 7 Unit(s) SubCutaneous every 6 hours  levETIRAcetam  Solution 500 milliGRAM(s) Oral two times a day  midodrine 10 milliGRAM(s) Oral every 8 hours  modafinil 100 milliGRAM(s) Oral <User Schedule>  pantoprazole   Suspension 40 milliGRAM(s) Oral daily  petrolatum Ophthalmic Ointment 1 Application(s) Both EYES two times a day  polyethylene glycol 3350 17 Gram(s) Oral daily  senna Syrup 10 milliLiter(s) Oral at bedtime  sodium chloride 3%  Inhalation 4 milliLiter(s) Inhalation every 6 hours  ticagrelor 60 milliGRAM(s) Oral every 12 hours  tobramycin for Nebulization 300 milliGRAM(s) Inhalation every 12 hours  valproic  acid Syrup 500 milliGRAM(s) Oral two times a day  zinc oxide 40% Paste 1 Application(s) Topical two times a day    MEDICATIONS  (PRN):      ITEMS UNCHECKED ARE NOT PRESENT     PRESENT SYMPTOMS: [x ]Unable to self-report due to altered mental status  [ ] CPOT [x ] PAINADs [x ] RDOS  Source if other than patient:  [ ]Family   [ ]Team     Pain: [ ]yes [ ]no  QOL impact -   Location -                    Aggravating factors -  Quality -  Radiation -  Timing-  Severity (0-10 scale):  Minimal acceptable level / Pain goal (0-10 scale):     CPOT:    [email or web address]    Dyspnea:                           [ ]Mild [ ]Moderate [ ]Severe  Anxiety:                             [ ]Mild [ ]Moderate [ ]Severe  Agitation:                          [ ]Mild [ ]Moderate [ ]Severe  Fatigue:                             [ ]Mild [ ]Moderate [ ]Severe  Nausea:                             [ ]Mild [ ]Moderate [ ]Severe  Loss of appetite:              [ ]Mild [ ]Moderate [ ]Severe  Constipation:                   [ ]Mild [ ]Moderate [ ]Severe  Diarrhea:                          [ ]Mild [ ]Moderate [ ]Severe      PCSSQ[Palliative Care Spiritual Screening Question]   Severity (0-10):  Score of 4 or > indicate consideration of Chaplaincy referral.  Chaplaincy Referral: [ ] yes [x ] declined[ ] following [ ] deferred    Caregiver [place]? : [ ] yes [ ] no [ ] Declined   [x ] Deferred            Social work referral [ ] Patient & Family Centered Care Referral [ ]     Anticipatory Grief present?:  [ ] yes [ ] no  [x ] Deferred                  Social work referral [ ] Chaplaincy Referral[ ]    Other Symptoms:  [ ]All other review of systems negative   [x ] Unable to obtain due to poor mentation     PHYSICAL EXAM:  Vital Signs Last 24 Hrs  T(C): 37.5 (22 Aug 2024 16:00), Max: 37.6 (21 Aug 2024 20:00)  T(F): 99.5 (22 Aug 2024 16:00), Max: 99.7 (21 Aug 2024 20:00)  HR: 115 (22 Aug 2024 18:00) (86 - 115)  BP: 125/64 (22 Aug 2024 18:00) (102/50 - 136/67)  BP(mean): 82 (22 Aug 2024 18:00) (65 - 87)  RR: 15 (22 Aug 2024 18:00) (11 - 17)  SpO2: 100% (22 Aug 2024 18:00) (96% - 100%)    Parameters below as of 22 Aug 2024 18:00  Patient On (Oxygen Delivery Method): ventilator    O2 Concentration (%): 30      GENERAL:  [ ]Alert  [ ]Oriented x   [ ]Lethargic  [ ]Cachexia  [x ]Unarousable  [ ]Verbal  [x ]Non-Verbal  [ ] No Distress  Behavioral:   [ ] Anxiety  [ ] Delirium [ ] Agitation [ ] Calm  [x ] Other-encephalopathy   HEENT:  [ ]Normal  [ ] Temporal Wasting  [ ]Dry mouth   [x ]ET Tube/Trach  [ ]Oral lesions  [ ] Mucositis  PULMONARY:   [ ]Clear [ ]Tachypnea  [ ]Audible excessive secretions   [x ]Rhonchi        [ ]Right [ ]Left [ ]Bilateral  [ ]Crackles        [ ]Right [ ]Left [ ]Bilateral  [ ]Wheezing     [ ]Right [ ]Left [ ]Bilateral  [ ]Diminished breath sounds [ ]right [ ]left [ ]bilateral  CARDIOVASCULAR:    [x ]Regular [ ]Irregular [ ]Tachy  [ ]Chacho [ ]Murmur [ ]Other  GASTROINTESTINAL:  [x ]Soft  [ ]Distended   [ ]+BS  [x ]Non tender [ ]Tender  [ x]PEG [ ]OGT/ NGT  Last BM: 8/21  GENITOURINARY:  [ ]Normal [ ] Incontinent   [ ]Oliguria/Anuria   [x ]Moss  MUSCULOSKELETAL:   [ ]Normal   [ ]Weakness  [x ]Bed/Wheelchair bound [ ]Edema  [  ] amputation  [  ] contraction  NEUROLOGIC:   [ ]No focal deficits  [x ]Cognitive impairment  [ ]Dysphagia [ ]Dysarthria [ ]Paresis [x ]Other - AAOx0, does not withdraw to painful stimuli, pupils equal round reactive to light  SKIN: See Nursing Skin Assessment for further details  [ ]Normal    [ ]Rash  [ ]Pressure ulcer(s)       Present on admission [ ]y [ ]n   [  ]  Wound    [  ] hyperpigmentation    CRITICAL CARE:  [ ] Shock Present  [ ]Septic [ ]Cardiogenic [ ]Neurologic [ ]Hypovolemic  [ ]  Vasopressors [ ]  Inotropes   [x ]Respiratory failure present [x ]Mechanical ventilation [ ]Non-invasive ventilatory support [ ]High flow  Mode: CPAP with PS, FiO2: 30, PEEP: 5, PS: 5  [x ]Acute  [ ]Chronic [x ]Hypoxic  [ ]Hypercarbic [ ]Other  [ ]Other organ failure     LABS:  reviewed                                             7.3    7.83  )-----------( 312      ( 22 Aug 2024 01:10 )             23.3       08-22    142  |  107  |  39<H>  ----------------------------<  135<H>  4.2   |  26  |  1.47<H>    Ca    8.4      22 Aug 2024 00:30  Phos  3.0     08-22  Mg     2.10     08-22    TPro  6.9  /  Alb  2.0<L>  /  TBili  0.2  /  DBili  x   /  AST  23  /  ALT  10  /  AlkPhos  64  08-22      PT/INR - ( 21 Aug 2024 00:20 )   PT: 13.0 sec;   INR: 1.16 ratio         PTT - ( 21 Aug 2024 00:20 )  PTT:31.6 sec    RADIOLOGY & ADDITIONAL STUDIES: reviewed     PROTEIN CALORIE MALNUTRITION PRESENT: [ ]mild [ ]moderate [ ]severe [ ]underweight [ ]morbid obesity  https://www.andeal.org/vault/2440/web/files/ONC/Table_Clinical%20Characteristics%20to%20Document%20Malnutrition-White%20JV%20et%20al%202012.pdf    Height (cm): 175.3 (08-18-24 @ 17:12), 175.3 (08-07-24 @ 00:29), 175.3 (07-21-24 @ 06:00)  Weight (kg): 79.7 (08-18-24 @ 17:12), 79.8 (08-17-24 @ 06:00), 74.7 (07-21-24 @ 06:00)  BMI (kg/m2): 25.9 (08-18-24 @ 17:12), 26 (08-17-24 @ 06:00), 24.3 (08-07-24 @ 00:29)    [x ]PPSV2 < or = to 30% [ ]significant weight loss  [ ]poor nutritional intake  [ ]anasarca [ x]Artificial Nutrition      REFERRALS:   [ ]Chaplaincy  [ ]Hospice  [ ]Child Life  [ ]Social Work  [ x]Case management [ ]Holistic Therapy

## 2024-08-23 NOTE — CHART NOTE - NSCHARTNOTEFT_GEN_A_CORE
Pt seen for malnutrition follow up.     Medical Course:  - Per chart, pt is 89 year old male PMH CAD s/p CABG/stents/PPM placement, HTN, HLD, type 2 DM, CKD, CVA x3, dysphagia s/p PEG, acute cholecystitis s/p cholecystostomy presenting with AMS found to have UTI with sepsis s/p PEA cardiac arrest (8/8) likely secondary to AHRF with aspiration s/p ROSC (after 13 mins) transferred to [facility] for MRI Brain found to have anoxic brain injury. Neurology and Palliative following. Ongoing GOC.     Nutrition Course:  - Pt remains intubated, maintained on Glucerna 1.5 via PEG at goal rate. No apparent signs of intolerance, last BM 8/23 per flowsheets. Ordered for senna syrup 10 mL qHS and Miralax 17 gm qD.   - Current nutrition prescription provides 540 mL formula, 810 kcal, 44.6 gm protein, 410 mL free water.   - Fingersticks elevated.     Diet   - NPO with Tube Feed: Glucerna 1.5 Prince via Gastrostomy initiated at 10 mL/hr increased by 10 mL q4 hrs to goal rate 30 mL/hr x18 hrs     Pertinent Medications:   - ADMELOG corrective regimen sliding scale, insulin NPH human recombinant 7U q6 hrs, midodrine, pantoprazole Suspension, polyethylene glycol, senna Syrup     Pertinent Labs:   - (8/22) Na 142 mmol/L Glu 135 mg/dL<H> K+ 4.2 mmol/L Cr 1.47 mg/dL<H> BUN 39 mg/dL<H> Phos 3.0 mg/dL Alb 2.0 g/dL<L>  - (8/21) HbA1c (8/21) 6.4%<H>  - POCT: (8/23) 173, (8/22) 135-236     Food Allergy:  - NKFA    Weight: (8/23) 169.3 lbs / 76.8 kg, (8/21) 165.4 lbs / 79.6 kg, (8/18 dosing) 175.7 lbs / 79.7 kg   Weight Assessment: Apparent ~6 lb (4%) weight loss since admission (5 days).   Height: 69 in / 175.3 cm  IBW: 160 lbs / 72.5 kg +/-10%  BMI: 25.0 kg/m^2 (at lowest weight)    Physical Assessment, per flowsheets:  Edema: 1+ generalized  Pressure Injury, per wound care consult (8/19): left heel unstageable, sacrum to bilateral buttocks stage II     Estimated Needs:   [X] No change since previous assessment, based on dosing weight 160 lbs / 72.5 kg   3887-0448 kcal daily @20-25 kcal/kg, 101.5-116 gm protein daily @1.4-1.6 gm/kg     Previous Nutrition Diagnosis: [X] Severe malnutrition in the context of acute illness or injury, ongoing   New Nutrition Diagnosis: [X] not applicable     Education:  [X] not applicable     Interventions:   1)   2)  3)     Monitor & Evaluate:  PO intake, tolerance to diet/supplement, nutrition related lab values, weight trends, BMs/GI distress, hydration status, skin integrity.  Ifeoma Hand RDN, CDN #[id number]  Also available on [employer] Teams Pt seen for malnutrition follow up.     Medical Course:  - Per chart, pt is 89 year old male PMH CAD s/p CABG/stents/PPM placement, HTN, HLD, type 2 DM, CKD, CVA x3, dysphagia s/p PEG, acute cholecystitis s/p cholecystostomy presenting with AMS found to have UTI with sepsis s/p PEA cardiac arrest (8/8) likely secondary to AHRF with aspiration s/p ROSC (after 13 mins) transferred to [facility] for MRI Brain found to have anoxic brain injury. Neurology and Palliative following. Ongoing GOC.     Nutrition Course:  - Pt remains intubated, maintained on Glucerna 1.5 via PEG at goal rate. No apparent signs of intolerance, last BM 8/23 per flowsheets. Ordered for senna syrup 10 mL qHS and Miralax 17 gm qD.   - Current nutrition prescription provides 540 mL formula, 810 kcal, 44.6 gm protein, 410 mL free water. This is suboptimal in both calorie and protein provision compared to pt's estimated nutrition needs, which pt has remained on x5 days.  - Fingersticks improving.    Diet   - NPO with Tube Feed: Glucerna 1.5 Prince via Gastrostomy initiated at 10 mL/hr increased by 10 mL q4 hrs to goal rate 30 mL/hr x18 hrs     Pertinent Medications:   - ADMELOG corrective regimen sliding scale, insulin NPH human recombinant 7U q6 hrs, midodrine, pantoprazole Suspension, polyethylene glycol, senna Syrup     Pertinent Labs:   - (8/22) Na 142 mmol/L Glu 135 mg/dL<H> K+ 4.2 mmol/L Cr 1.47 mg/dL<H> BUN 39 mg/dL<H> Phos 3.0 mg/dL Alb 2.0 g/dL<L>  - (8/21) HbA1c (8/21) 6.4%<H>  - POCT: (8/23) 173, (8/22) 135-236     Food Allergy:  - NKFA    Weight: (8/23) 169.3 lbs / 76.8 kg, (8/21) 165.4 lbs / 79.6 kg, (8/18 dosing) 175.7 lbs / 79.7 kg   Weight Assessment: Apparent ~6 lb (4%) weight loss since admission (5 days).   Height: 69 in / 175.3 cm  IBW: 160 lbs / 72.5 kg +/-10%  BMI: 25.0 kg/m^2 (at lowest weight)    Physical Assessment, per flowsheets:  Edema: 1+ generalized  Pressure Injury, per wound care consult (8/19): left heel unstageable, sacrum to bilateral buttocks stage II     Estimated Needs:   [X] No change since previous assessment, based on dosing weight 160 lbs / 72.5 kg   3063-5558 kcal daily @20-25 kcal/kg, 101.5-116 gm protein daily @1.4-1.6 gm/kg     Previous Nutrition Diagnosis: [X] Severe malnutrition in the context of acute illness or injury, ongoing   New Nutrition Diagnosis: [X] not applicable     Education:  [X] not applicable     Interventions:   1) Nutrition care plan to align with GOC. Consider increasing Glucerna 1.5 to goal rate 35 mL/hr x18 hrs + Liquid Protein Supplement 4x daily to provide a total of 630 mL formula, 1345 kcal (945 kcal from formula, 400 kcal from modular), 112 gm protein (52 gm protein from formula, 60 gm protein from modular), 478 mL free water daily. This meets 19 kcal/kg, 1.5 gm protein/kg @ IBW 72.5 kg. Additional provision of free water per medical discretion.  2) Obtain daily weights.     Monitor & Evaluate:  Tolerance to diet, nutrition related lab values, weight trends, BMs/GI distress, hydration status, skin integrity.  Ifeoma Hand RDN, CDN #[id number]  Also available on [employer] Teams

## 2024-08-23 NOTE — PROGRESS NOTE ADULT - SUBJECTIVE AND OBJECTIVE BOX
Epi Huntley MD  Internal Medicine, PGY3    MICU Progress Note    CHIEF COMPLAINT: SHAIK CHARANJIT is a 89yo Male with PMH *** presenting with ***.    Interval Events:    Meds administered:  modafinil: 100 milliGRAM(s) Oral (08-23 @ 05:15)  zinc oxide 40% Paste: 1 Application(s) Topical (08-23 @ 05:14)  valproic  acid Syrup: 500 milliGRAM(s) Oral (08-23 @ 05:14)  ticagrelor: 60 milliGRAM(s) Oral (08-23 @ 05:14)  petrolatum Ophthalmic Ointment: 1 Application(s) Both EYES (08-23 @ 05:14)  levETIRAcetam  Solution: 500 milliGRAM(s) Oral (08-23 @ 05:14)  heparin   Injectable: 5000 Unit(s) SubCutaneous (08-23 @ 05:14)  chlorhexidine 0.12% Liquid: 15 milliLiter(s) Oral Mucosa (08-23 @ 05:14)  midodrine: 10 milliGRAM(s) Oral (08-23 @ 05:13)  insulin lispro (ADMELOG) corrective regimen sliding scale: 2 Unit(s) SubCutaneous (08-23 @ 05:13)  sodium chloride 3%  Inhalation: 4 milliLiter(s) Inhalation (08-23 @ 03:40)  albuterol/ipratropium for Nebulization: 3 milliLiter(s) Nebulizer (08-23 @ 03:40)  insulin NPH human recombinant: 7 Unit(s) SubCutaneous (08-22 @ 23:25)  insulin lispro (ADMELOG) corrective regimen sliding scale: 4 Unit(s) SubCutaneous (08-22 @ 23:24)  senna Syrup: 10 milliLiter(s) Oral (08-22 @ 22:11)  heparin   Injectable: 5000 Unit(s) SubCutaneous (08-22 @ 22:11)  doxazosin: 2 milliGRAM(s) Oral (08-22 @ 22:11)        OBJECTIVE:  Vitals:  T(F): 100.2 (08-23-24 @ 04:00), Max: 100.2 (08-23-24 @ 04:00)  HR: 105 (08-23-24 @ 04:31) (86 - 115)  BP: 118/52 (08-23-24 @ 04:00) (106/50 - 136/67)  BP(mean): 72 (08-23-24 @ 04:00) (65 - 87)  ABP: --  ABP(mean): --  RR: 21 (08-23-24 @ 04:00) (14 - 21)  SpO2: 96% (08-23-24 @ 04:31) (96% - 100%)  CVP(mm Hg): --  I/O Detail 24H    22 Aug 2024 07:01  -  23 Aug 2024 07:00  --------------------------------------------------------  IN:    Enteral Tube Flush: 250 mL    Glucerna 1.5: 510 mL  Total IN: 760 mL    OUT:    Indwelling Catheter - Urethral (mL): 1060 mL  Total OUT: 1060 mL    Total NET: -300 mL          HOSPITAL MEDICATIONS:  Standing Meds:  albuterol/ipratropium for Nebulization 3 milliLiter(s) Nebulizer every 6 hours  aspirin  chewable 81 milliGRAM(s) Oral daily  chlorhexidine 0.12% Liquid 15 milliLiter(s) Oral Mucosa two times a day  chlorhexidine 2% Cloths 1 Application(s) Topical daily  dextrose 5%. 1000 milliLiter(s) IV Continuous <Continuous>  dextrose 5%. 1000 milliLiter(s) IV Continuous <Continuous>  dextrose 50% Injectable 25 Gram(s) IV Push once  dextrose 50% Injectable 25 Gram(s) IV Push once  dextrose Oral Gel 15 Gram(s) Oral once  doxazosin 2 milliGRAM(s) Oral at bedtime  escitalopram 10 milliGRAM(s) Oral daily  glucagon  Injectable 1 milliGRAM(s) IntraMuscular once  heparin   Injectable 5000 Unit(s) SubCutaneous every 8 hours  insulin lispro (ADMELOG) corrective regimen sliding scale   SubCutaneous every 6 hours  insulin NPH human recombinant 7 Unit(s) SubCutaneous every 6 hours  levETIRAcetam  Solution 500 milliGRAM(s) Oral two times a day  midodrine 10 milliGRAM(s) Oral every 8 hours  modafinil 100 milliGRAM(s) Oral <User Schedule>  pantoprazole   Suspension 40 milliGRAM(s) Oral daily  petrolatum Ophthalmic Ointment 1 Application(s) Both EYES two times a day  polyethylene glycol 3350 17 Gram(s) Oral daily  senna Syrup 10 milliLiter(s) Oral at bedtime  sodium chloride 3%  Inhalation 4 milliLiter(s) Inhalation every 6 hours  ticagrelor 60 milliGRAM(s) Oral every 12 hours  tobramycin for Nebulization 300 milliGRAM(s) Inhalation every 12 hours  valproic  acid Syrup 500 milliGRAM(s) Oral two times a day  zinc oxide 40% Paste 1 Application(s) Topical two times a day      PRN Meds:      PHYSICAL EXAM:  GENERAL: NAD, lying in bed comfortably  HEAD:  Atraumatic, Normocephalic  EYES: EOMI, PERRLA, conjunctiva and sclera clear  ENT: Moist mucous membranes  NECK: Supple, No JVD  CHEST/LUNG: Clear to auscultation bilaterally; No rales, rhonchi, wheezing, or rubs. Unlabored respirations  HEART: Regular rate and rhythm; No murmurs, rubs, or gallops  ABDOMEN: Bowel sounds present; Soft, Nontender, Nondistended. No hepatomegaly  EXTREMITIES:  2+ Peripheral Pulses, brisk capillary refill. No clubbing, cyanosis, or edema  NERVOUS SYSTEM:  Alert & Oriented X3, speech clear. No deficits   MSK: FROM all 4 extremities, full and equal strength  SKIN: No rashes or lesions   Epi Huntley MD  Internal Medicine, PGY3    MICU Progress Note    Interval Events:  No acute events overnight  Family wants labs EOD    Meds administered:  modafinil: 100 milliGRAM(s) Oral (08-23 @ 05:15)  zinc oxide 40% Paste: 1 Application(s) Topical (08-23 @ 05:14)  valproic  acid Syrup: 500 milliGRAM(s) Oral (08-23 @ 05:14)  ticagrelor: 60 milliGRAM(s) Oral (08-23 @ 05:14)  petrolatum Ophthalmic Ointment: 1 Application(s) Both EYES (08-23 @ 05:14)  levETIRAcetam  Solution: 500 milliGRAM(s) Oral (08-23 @ 05:14)  heparin   Injectable: 5000 Unit(s) SubCutaneous (08-23 @ 05:14)  chlorhexidine 0.12% Liquid: 15 milliLiter(s) Oral Mucosa (08-23 @ 05:14)  midodrine: 10 milliGRAM(s) Oral (08-23 @ 05:13)  insulin lispro (ADMELOG) corrective regimen sliding scale: 2 Unit(s) SubCutaneous (08-23 @ 05:13)  sodium chloride 3%  Inhalation: 4 milliLiter(s) Inhalation (08-23 @ 03:40)  albuterol/ipratropium for Nebulization: 3 milliLiter(s) Nebulizer (08-23 @ 03:40)  insulin NPH human recombinant: 7 Unit(s) SubCutaneous (08-22 @ 23:25)  insulin lispro (ADMELOG) corrective regimen sliding scale: 4 Unit(s) SubCutaneous (08-22 @ 23:24)  senna Syrup: 10 milliLiter(s) Oral (08-22 @ 22:11)  heparin   Injectable: 5000 Unit(s) SubCutaneous (08-22 @ 22:11)  doxazosin: 2 milliGRAM(s) Oral (08-22 @ 22:11)        OBJECTIVE:  Vitals:  T(F): 100.2 (08-23-24 @ 04:00), Max: 100.2 (08-23-24 @ 04:00)  HR: 105 (08-23-24 @ 04:31) (86 - 115)  BP: 118/52 (08-23-24 @ 04:00) (106/50 - 136/67)  BP(mean): 72 (08-23-24 @ 04:00) (65 - 87)  ABP: --  ABP(mean): --  RR: 21 (08-23-24 @ 04:00) (14 - 21)  SpO2: 96% (08-23-24 @ 04:31) (96% - 100%)  CVP(mm Hg): --  I/O Detail 24H    22 Aug 2024 07:01  -  23 Aug 2024 07:00  --------------------------------------------------------  IN:    Enteral Tube Flush: 250 mL    Glucerna 1.5: 510 mL  Total IN: 760 mL    OUT:    Indwelling Catheter - Urethral (mL): 1060 mL  Total OUT: 1060 mL    Total NET: -300 mL          HOSPITAL MEDICATIONS:  Standing Meds:  albuterol/ipratropium for Nebulization 3 milliLiter(s) Nebulizer every 6 hours  aspirin  chewable 81 milliGRAM(s) Oral daily  chlorhexidine 0.12% Liquid 15 milliLiter(s) Oral Mucosa two times a day  chlorhexidine 2% Cloths 1 Application(s) Topical daily  dextrose 5%. 1000 milliLiter(s) IV Continuous <Continuous>  dextrose 5%. 1000 milliLiter(s) IV Continuous <Continuous>  dextrose 50% Injectable 25 Gram(s) IV Push once  dextrose 50% Injectable 25 Gram(s) IV Push once  dextrose Oral Gel 15 Gram(s) Oral once  doxazosin 2 milliGRAM(s) Oral at bedtime  escitalopram 10 milliGRAM(s) Oral daily  glucagon  Injectable 1 milliGRAM(s) IntraMuscular once  heparin   Injectable 5000 Unit(s) SubCutaneous every 8 hours  insulin lispro (ADMELOG) corrective regimen sliding scale   SubCutaneous every 6 hours  insulin NPH human recombinant 7 Unit(s) SubCutaneous every 6 hours  levETIRAcetam  Solution 500 milliGRAM(s) Oral two times a day  midodrine 10 milliGRAM(s) Oral every 8 hours  modafinil 100 milliGRAM(s) Oral <User Schedule>  pantoprazole   Suspension 40 milliGRAM(s) Oral daily  petrolatum Ophthalmic Ointment 1 Application(s) Both EYES two times a day  polyethylene glycol 3350 17 Gram(s) Oral daily  senna Syrup 10 milliLiter(s) Oral at bedtime  sodium chloride 3%  Inhalation 4 milliLiter(s) Inhalation every 6 hours  ticagrelor 60 milliGRAM(s) Oral every 12 hours  tobramycin for Nebulization 300 milliGRAM(s) Inhalation every 12 hours  valproic  acid Syrup 500 milliGRAM(s) Oral two times a day  zinc oxide 40% Paste 1 Application(s) Topical two times a day      PRN Meds:      PHYSICAL EXAM:  GEN: intubated, sedated  HEAD:  Atraumatic, Normocephalic  EYES: EOMI, PERRLA, conjunctiva and sclera clear  ENT: Moist mucous membranes  NECK: Supple, No JVD  CHEST/LUNG: Rhonchorous breath sounds; No rales, rhonchi, wheezing, or rubs.   HEART: Regular rate and rhythm; No murmurs, rubs, or gallops  ABDOMEN: Bowel sounds present; Soft, Nontender, Nondistended. No hepatomegaly  EXTREMITIES:  2+ Peripheral Pulses, brisk capillary refill. No clubbing, cyanosis, or edema  NERVOUS SYSTEM:  Alert & Oriented X0; withdraws slightly to pain; eyes open; does not follow commands  MSK: equal muscle bulk  SKIN: No rashes or lesions

## 2024-08-23 NOTE — PROGRESS NOTE ADULT - ASSESSMENT
Pt is a 89 yo M with h/o CAD, CABG, s/p stents, s/p PPM placement, HTN, HLD, DM, CKD, CVA x3, s/p PEG tube for dysphagia and acute cholecystitis s/p cholecystostomy ( not surgical candidate, s/p cholecystomy tube removal) initially presented/admitted on 8/7 2 to MS change; found to have UTI with sepsis and acute on CKD s/p PEA cardiac arrest 8/8 likely 2/2 AHRF 2/2 aspiration s/p ROSC (after 13 mins), now transferred to [facility] for MRI Brain to assess prognostication iso concern for anoxic brain injury.      NEURO:  #Mental status: AAOx0. Baseline AAOx1. Likely 2/2 anoxic encephalopathy .  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  - on modafinil 100mg BID as per family's request to stimulate mental status. Started on 8/17/24  - Neuro consulted for prognostication per family request    CV:  #HD stable  - No pressor requirements   - Midodrine 10mg q8  - plan for Lasix 40mg IV x1 8/20 for anasarca  - plan for Lasix 40mg IV x1 8/21 for anasarca    #PEA arrest   - due to aspiration 8/7    #CHF EF 45%  - GDMT currently held    #HTN/HLD:   - Not on any home medications     # h/o ppm  - EP consult in AM for interrogation so patient can get MRI    PULM:  #Intubated 2/2 aspiration pna  - CPAP 5/5, triggering spontaneously  - Duonebs/Nebulized 3% NS for pulmonary toiletining   - Chest IPV     RENAL:  #MABLE:   -Trend I/Os and daily weights, and Cr. 1.5 --> 1.9    GI:  #PEG site leaking  - Resolved, no active leak  #Diet: PEG feeds resumed    - Pantpoprozole for GI prophylaxis   - Bowel reg w senna/miralax       ENDO:  #DM2: HbA1c 7.7%   - Insulin Sliding Scale  - Insulin Glargine 17U   - ISS    HEMATOLOGIC:  #Anemia of chronic disease  - s/p 1 unit PRBC 8/16   #Coag panel shows  #DVT prophylaxis with lovenox    # RUE + RLE Swelling  - DVT study ordered     ID:  #ASA PNA   - Sputum cx + pseudomonas, pansensitive   - extended infusion high dose zosyn 4.5g q8hrs 8/8-8/19  - Zosyn stopped 8/19  - BCx ordered today 8/19  - Monitor for signs of infection     Psych:  #Depression  - Continue home medication lexapro     Ethics:  - DNR/intubate  - family discussing about palliative extubation and transition to comfort care    Dispo:   - MICU  - family discussing about palliative extubation and transition to comfort care Pt is a 89 yo M with h/o CAD, CABG, s/p stents, s/p PPM placement, HTN, HLD, DM, CKD, CVA x3, s/p PEG tube for dysphagia and acute cholecystitis s/p cholecystostomy ( not surgical candidate, s/p cholecystomy tube removal) initially presented/admitted on 8/7 2 to MS change; found to have UTI with sepsis and acute on CKD s/p PEA cardiac arrest 8/8 likely 2/2 AHRF 2/2 aspiration s/p ROSC (after 13 mins), now transferred to [facility] for MRI Brain to assess prognostication iso concern for anoxic brain injury.      NEURO:  #Mental status: AAOx0. Baseline AAOx1. Likely 2/2 anoxic encephalopathy .  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  - on modafinil 100mg BID as per family's request to stimulate mental status. Started on 8/17/24  - Neuro following for prognostication per family request        - awaiting 2h EEG read    CV:  #HD stable  - No pressor requirements   - Midodrine 10mg q8  - plan for Lasix 40mg IV x1 8/20 for anasarca  - plan for Lasix 40mg IV x1 8/21 for anasarca    #PEA arrest   - due to aspiration 8/7    #CHF EF 45%  - GDMT currently held    #HTN/HLD:   - Not on any home medications     # h/o ppm  - EP consult in AM for interrogation so patient can get MRI    PULM:  #Intubated 2/2 aspiration pna  - CPAP 5/5, triggering spontaneously  - Duonebs/Nebulized 3% NS for pulmonary toiletining   - Chest IPV     RENAL:  #MABLE:   -Trend I/Os and daily weights, and Cr. 1.5 --> 1.9  - Cr coming down  - Lasix 40mg IV PRN for anasarca    GI:  #PEG site leaking  - Resolved, no active leak  #Diet: PEG feeds resumed    - Pantpoprozole for GI prophylaxis   - Bowel reg w senna/miralax       ENDO:  #DM2: HbA1c 7.7%   - Insulin Sliding Scale  - on NPH 7u q6h  - ISS    HEMATOLOGIC:  #Anemia of chronic disease  - s/p 1 unit PRBC 8/16   #Coag panel shows  #DVT prophylaxis with lovenox    # RUE + RLE Swelling  - DVT study ordered; negative    ID:  #ASA PNA   - Sputum cx + pseudomonas, pansensitive   - started on inhaled Tobramycin 300mg q12h (8/22-) to enhance secretion clearance  - extended infusion high dose zosyn 4.5g q8hrs 8/8-8/19  - Zosyn stopped 8/19  - BCx ordered today 8/19  - Monitor for signs of infection     Psych:  #Depression  - Continue home medication lexapro     Ethics:  - DNR/intubate  - family discussing about palliative extubation and transition to comfort care    Dispo:   - MICU  - family discussing about palliative extubation and transition to comfort care Pt is a 89 yo M with h/o CAD, CABG, s/p stents, s/p PPM placement, HTN, HLD, DM, CKD, CVA x3, s/p PEG tube for dysphagia and acute cholecystitis s/p cholecystostomy ( not surgical candidate, s/p cholecystomy tube removal) initially presented/admitted on 8/7 2 to MS change; found to have UTI with sepsis and acute on CKD s/p PEA cardiac arrest 8/8 likely 2/2 AHRF 2/2 aspiration s/p ROSC (after 13 mins), now transferred to [facility] for MRI Brain to assess prognostication iso concern for anoxic brain injury.      NEURO:  #Mental status: AAOx0. Baseline AAOx1. Likely 2/2 anoxic encephalopathy .  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  - on modafinil 100mg BID as per family's request to stimulate mental status. Started on 8/17/24  - Neuro following for prognostication per family request        - awaiting 2h EEG read    CV:  #HD stable  - No pressor requirements   - Midodrine 10mg q8  - plan for Lasix 40mg IV x1 8/20 for anasarca  - plan for Lasix 40mg IV x1 8/21 for anasarca    #PEA arrest   - due to aspiration 8/7    #CHF EF 45%  - GDMT currently held    #HTN/HLD:   - Not on any home medications     # h/o ppm  - EP consult in AM for interrogation so patient can get MRI- completed and MRI done    PULM:  #Intubated 2/2 aspiration pna  - CPAP 5/5, triggering spontaneously  - Duonebs/Nebulized 3% NS for pulmonary toiletining   - Chest IPV     RENAL:  #MABLE:   -Trend I/Os and daily weights, and Cr. 1.5 --> 1.9  - Cr coming down  - Lasix 40mg IV PRN for anasarca    GI:  #PEG site leaking  - Resolved, no active leak  #Diet: PEG feeds resumed    - Pantpoprozole for GI prophylaxis   - Bowel reg w senna/miralax       ENDO:  #DM2: HbA1c 7.7%   - Insulin Sliding Scale  - on NPH 7u q6h  - ISS    HEMATOLOGIC:  #Anemia of chronic disease  - s/p 1 unit PRBC 8/16   #Coag panel shows  #DVT prophylaxis with lovenox    # RUE + RLE Swelling  - DVT study ordered; negative    ID:  #ASA PNA   - Sputum cx + pseudomonas, pansensitive   - started on inhaled Tobramycin 300mg q12h (8/22-) to enhance secretion clearance  - extended infusion high dose zosyn 4.5g q8hrs 8/8-8/19  - Zosyn stopped 8/19  - BCx ordered today 8/19  - Monitor for signs of infection     Psych:  #Depression  - Continue home medication lexapro     Ethics:  - DNR/intubate  - family discussing about palliative extubation and transition to comfort care    Dispo:   - MICU  - family discussing about palliative extubation and transition to comfort care

## 2024-08-23 NOTE — PROGRESS NOTE ADULT - ATTENDING COMMENTS
Patient is a 89 yo M w/ CAD s/p CABG s/p stents (last stent 5/2022), s/p PPM, HTN, HLD, T2DM, CKD, PVD, prior CVA x3, Myoclonic Jerks/Seizures, history of acute cholecystitis s/p cholecysteostomy who was initially admitted to [facility] on 8/7 after p/w AMS. Patient found to have UTI with sepsis and acute on chronic kidney failure with course c/b PEA arrest x 13 min likely due to AHRF 2/2 aspiration, now with concern for anoxic encephalopathy, transferred to [facility] for MRI brain for prognostication.     #Anoxic encephalopathy  #Post Cardiac Arrest  #MABLE on CKD  #Acute respiratory failure  #Fever  #Pseudomonas Pneumonia  - MRI brain consistent with Hypoxemic ischemic encephalopathy  - Monitor off sedation, EEGs negative. NSE elevated to 46 but 4 days post arrest. Family requesting Neurology consult for prognostication. Neuro recommending 2h EEG, ordered  - c/w mechanical ventilatory support, overbreathes vent. Secretions improved. c/w airway clearance. Will resend sputum cultures  - d/dangelo Zosyn as completed 10 day course on 8/19. Added nebulized Jerry on 8/22  - c/w airway clearance, IPV  - Lasix PRN to maintain euvolemia  - Palliative consult apprecaited. DNR. Discussed with family at bedside today, amenable for palliative extubation after optimization of secretions, possibly over weekend  - c/w NPH, monitor FSG    Neil Goode MD  Pulmonary & Critical Care

## 2024-08-24 NOTE — CHART NOTE - NSCHARTNOTEFT_GEN_A_CORE
Interim events:  VPA trough level obtained on 8/24 - 36.4. Albumin 2.0.  Corrected albumin level 173.6.    Impression:  Anoxic ischemic encephalopathy with poor prognosis.    Additional recommendations:  [] please decrease VPA to 250 mg TID (home dose)  [] no need for repeat VPA level following VPA dose adjustment above  [] f/u repeat 2 hour EEG  [] continue keppra 500 mg BID    Finalized upon attestation. For any questions please call spectra at [phone]. Thank you. Interim events:  VPA trough level obtained on 8/24 - 36.4. Albumin 2.0.  Corrected albumin level 173.6.    Impression:  Anoxic ischemic encephalopathy with poor prognosis.    Additional recommendations:  [] please decrease VPA to 250 mg TID (home dose)  [] no need for repeat VPA level following VPA dose adjustment above  [] f/u repeat 2 hour EEG  [] continue keppra 500 mg BID.    Finalized upon attestation. For any questions please call spectra at [phone]. Thank you.

## 2024-08-24 NOTE — PROGRESS NOTE ADULT - ATTENDING COMMENTS
Patient seen and examined with ICU team at bedside during rounds after lab data, medical records and radiology reports reviewed. I have read and agreeable in general with the documented note, assessment, and management plan which reflects my opinions from bedside rounds.      SHAIK CHARANJIT is a 89y Male with PMH of  CABG (2022 s/p PPM) Dm2 CKD, several CVAs, Admitted for ***. MICU admission on *** for ***.     Patient is a 89 yo M w/ CAD s/p CABG s/p stents (last stent 5/2022), s/p PPM, HTN, HLD, T2DM, CKD, PVD, prior CVA x3, Myoclonic Jerks/Seizures, history of acute cholecystitis s/p cholecysteostomy who was initially admitted to [facility] on 8/7 after p/w AMS. Patient found to have UTI with sepsis and acute on chronic kidney failure with course c/b PEA arrest x 13 min likely due to AHRF 2/2 aspiration, now with concern for anoxic encephalopathy, transferred to [facility] for MRI brain for prognostication.       EEG report from repeat with diffuse slowing     #Acute Hypoxic Respiratory Failure  Mode: CPAP with PS, FiO2: 30, PEEP: 5, PS: 15  - CPOT 0 / RASS -1  - no plan for extubation today -- mental status   - some discussion surrounding   #PsA Pneumonia  - continuing iTobi  - continue chest PT, saline nebs   #post PEA arrest  #Anoxic brain injury  - MRI demonstrating Hypoxemic ischemic encephalopathy  - EEGs negative. NSE elevated to 46 but 4 days post arrest  - Neuro following for prognostication, Repeated EEG (Severe diffuse slowing no seizures)   # BM - overnight  # POCUS - ni  # DVT ppx - Enox  # GOC - DNR        This patient is critically ill and required frequent bedside visits with therapy change. I have personally provided 62 minutes of critical care time concurrently with the resident/fellow/NP/PA. This time excludes time spent on separate procedures and time spent teaching. I have reviewed theresident/fellow/NP/PA’s documentation and I agree with the assessment and plan of care.    Rangel Kinsey MD  Interventional Pulmonology & Critical Care Medicine Patient seen and examined with ICU team at bedside during rounds after lab data, medical records and radiology reports reviewed. I have read and agreeable in general with the documented note, assessment, and management plan which reflects my opinions from bedside rounds.      SHAIK CHARANJIT is a 89y Male with PMH of  CABG (2022 s/p PPM) Dm2 CKD, several CVAs, Admitted for PEA arrest after AHRF 2/2 aspiration transferred to MICU for MRI evaluation     EEG report from repeat with diffuse slowing     #Acute Hypoxic Respiratory Failure  Mode: CPAP with PS, FiO2: 30, PEEP: 5, PS: 15  - CPOT 0 / RASS -1  - no plan for extubation today -- mental status   - some discussion surrounding   #PsA Pneumonia  - continuing iTobi  - continue chest PT, saline nebs   #post PEA arrest  #Anoxic brain injury  - MRI demonstrating Hypoxemic ischemic encephalopathy  - EEGs negative. NSE elevated to 46 but 4 days post arrest  - Neuro following for prognostication, Repeated EEG (Severe diffuse slowing no seizures)   # BM - overnight  # POCUS - ni  # DVT ppx - Enox  # GOC - DNR        This patient is critically ill and required frequent bedside visits with therapy change. I have personally provided 62 minutes of critical care time concurrently with the resident/fellow/NP/PA. This time excludes time spent on separate procedures and time spent teaching. I have reviewed theresident/fellow/NP/PA’s documentation and I agree with the assessment and plan of care.    Rangel Kinsey MD  Interventional Pulmonology & Critical Care Medicine

## 2024-08-24 NOTE — CHART NOTE - NSCHARTNOTEFT_GEN_A_CORE
Study Date: 08-23-24 14:07-17:14  Duration: 2 hr 49 min  EEG Classification / Summary:  Abnormal EEG in a comatose patient.  Severe diffuse slowing.  No focal or epileptiform abnormalities are captured.   No seizures.    Clinical Impression:  Severe diffuse cerebral dysfunction is nonspecific in etiology.   No epileptiform abnormalities or seizures.    D/W family - VPA was recently decreased to 125 mg BID.     A/P  Mr. Foss is a 89 yo man with severe anoxic ischemic encephalopathy.   Given the history, neurological examination (off sedation for at least 8/18), NSE, EEG and repeat EEG, and MRI brain he has a very poor prognosis for meaningful neurological functional recovery.  D/C modafinil  Change VPA to 125 mg TID, no need to recheck levels.   Continue levetiracetam 500 mg BID.   D/W family.  Neurology signing off. Please reconsult PRN or call [employer] [phone] with any questions.   Thank you.

## 2024-08-24 NOTE — EEG REPORT - NS EEG TEXT BOX
SHAIK DONOHUE [facility]-[id number]     Study Date: 08-23-24 14:07-17:14  Duration: 2 hr 49 min  --------------------------------------------------------------------------------------------------  History:  CC/ HPI Patient is a 89y old  Male who presents with a chief complaint of Cardiac Arrest (24 Aug 2024 07:34)    MEDICATIONS  (STANDING):  albuterol/ipratropium for Nebulization 3 milliLiter(s) Nebulizer every 6 hours  aspirin  chewable 81 milliGRAM(s) Oral daily  chlorhexidine 0.12% Liquid 15 milliLiter(s) Oral Mucosa two times a day  chlorhexidine 2% Cloths 1 Application(s) Topical daily  dextrose 5%. 1000 milliLiter(s) (50 mL/Hr) IV Continuous <Continuous>  dextrose 5%. 1000 milliLiter(s) (100 mL/Hr) IV Continuous <Continuous>  dextrose 50% Injectable 25 Gram(s) IV Push once  dextrose 50% Injectable 25 Gram(s) IV Push once  dextrose Oral Gel 15 Gram(s) Oral once  doxazosin 2 milliGRAM(s) Oral at bedtime  escitalopram 10 milliGRAM(s) Oral daily  glucagon  Injectable 1 milliGRAM(s) IntraMuscular once  heparin   Injectable 5000 Unit(s) SubCutaneous every 8 hours  insulin lispro (ADMELOG) corrective regimen sliding scale   SubCutaneous every 6 hours  insulin NPH human recombinant 7 Unit(s) SubCutaneous every 6 hours  levETIRAcetam  Solution 500 milliGRAM(s) Oral two times a day  midodrine 10 milliGRAM(s) Oral every 8 hours  modafinil 100 milliGRAM(s) Oral <User Schedule>  pantoprazole   Suspension 40 milliGRAM(s) Oral daily  petrolatum Ophthalmic Ointment 1 Application(s) Both EYES two times a day  polyethylene glycol 3350 17 Gram(s) Oral daily  senna Syrup 10 milliLiter(s) Oral at bedtime  sodium chloride 3%  Inhalation 4 milliLiter(s) Inhalation every 6 hours  ticagrelor 60 milliGRAM(s) Oral every 12 hours  tobramycin for Nebulization 300 milliGRAM(s) Inhalation every 12 hours  valproic  acid Syrup 125 milliGRAM(s) Oral three times a day  zinc oxide 40% Paste 1 Application(s) Topical two times a day    --------------------------------------------------------------------------------------------------  Study Interpretation:    [[[Abbreviation Key:  PDR=alpha rhythm/posterior dominant rhythm. A-P=anterior posterior.  Amplitude: ‘very low’:<20; ‘low’:20-49; ‘medium’:; ‘high’:>150uV.  Persistence for periodic/rhythmic patterns (% of epoch) ‘rare’:<1%; ‘occasional’:1-10%; ‘frequent’:10-50%; ‘abundant’:50-90%; ‘continuous’:>90%.  Persistence for sporadic discharges: ‘rare’:<1/hr; ‘occasional’:1/min-1/hr; ‘frequent’:>1/min; ‘abundant’:>1/10 sec.  RPP=rhythmic and periodic patterns; GRDA=generalized rhythmic delta activity; FIRDA=frontal intermittent GRDA; LRDA=lateralized rhythmic delta activity; TIRDA=temporal intermittent rhythmic delta activity;  LPD=PLED=lateralized periodic discharges; GPD=generalized periodic discharges; BIPDs =bilateral independent periodic discharges; Mf=multifocal; SIRPDs=stimulus induced rhythmic, periodic, or ictal appearing discharges; BIRDs=brief potentially ictal rhythmic discharges >4 Hz, lasting .5-10s; PFA (paroxysmal bursts >13 Hz or =8 Hz <10s).  Modifiers: +F=with fast component; +S=with spike component; +R=with rhythmic component.  S-B=burst suppression pattern.  Max=maximal. N1-drowsy; N2-stage II sleep; N3-slow wave sleep. SSS/BETS=small sharp spikes/benign epileptiform transients of sleep. HV=hyperventilation; PS=photic stimulation]]]    Daily EEG Visual Analysis    FINDINGS:      Background:  The background is symmetric and 95% suppressed <10 uV. There are intermittent delta/theta frequencies, rarely sharply contoured, mostly <= 10 uV in amplitude.    Background Slowing:  Generalized slowing: As above  Focal slowing: None    State Changes:   Absent    Interictal Findings:  None    Electrographic and Electroclinical seizures:  None    Other Clinical Events:  None    Activation Procedures:   Hyperventilation is not performed.    Photic stimulation is performed and does not elicit any abnormalities.      Artifacts:  Intermittent myogenic and movement artifacts are present.    Single-lead EKG: Mostly regular rhythm. Irregular toward the end of recording.    EEG Classification / Summary:  Abnormal EEG in a comatose patient.  Severe diffuse slowing.  No focal or epileptiform abnormalities are captured.   No seizures.    Clinical Impression:  Severe diffuse cerebral dysfunction is nonspecific in etiology.   No epileptiform abnormalities or seizures.          -------------------------------------------------------------------------------------------------------    Karyn Escobar MD  Attending Physician, [facility]    -------------------------------------------------------------------------------------------------------    To reach EEG technologist:  Please use the pager number for the appropriate hospital or contact the .  At [facility] - Pager #: [phone]    To reach EEG-reading physician:  [facility] EEG Reading Room Phone #: [phone]  Epilepsy Answering Service after 5PM and before 8:30AM: Phone #: [phone]

## 2024-08-24 NOTE — PROGRESS NOTE ADULT - ASSESSMENT
89M CAD, CABG, s/p stents, s/p PPM placement, HTN, HLD, DM, CKD, CVA x3, s/p PEG tube for dysphagia and acute cholecystitis s/p cholecystostomy ( not surgical candidate, s/p cholecystomy tube removal) initially presented/admitted on 8/7 2 to MS change; found to have UTI with sepsis and acute on CKD s/p PEA cardiac arrest 8/8 likely 2/2 AHRF 2/2 aspiration s/p ROSC (after 13 mins), now transferred to [facility] for MRI Brain to assess prognostication iso concern for anoxic brain injury.      NEURO:  #Mental status: AAOx0. Baseline AAOx1. Likely 2/2 anoxic encephalopathy .  - MRI for prognostication post PEA arrest -> ischemic encephalopathy consistent with anoxic brain injury  - reduced Depakote to home dose. c/w Keppra 500mg BID  - on aspirin, Brilinta, and statin for secondary stroke prevention.  - on modafinil 100mg BID as per family's request to stimulate mental status. Started on 8/17/24  - Neuro following for prognostication per family request        - awaiting 2h EEG read    CV:  #s/p PEA arrest (8/8) likely 2/2 aspiration 8/7  #CHF EF 45%  #PPM  - GDMT currently held  - Currently no IV pressor requirements   - Midodrine 10mg q8  - s/p Lasix 40mg IVPs for anasarca    #HTN/HLD  - Not on any home medications     PULM:  #Intubated 2/2 aspiration pna  s/p zosyn  - CPAP 5/5, triggering spontaneously  - Duonebs/Nebulized 3% NS for pulmonary toileting   - Chest IPV   - c/w tobramycin nebs (8/22-)    RENAL:  #MABLE:   -Trend I/Os and daily weights, and Cr. 1.5 --> 1.9  - Cr coming down  - Lasix 40mg IV PRN for anasarca    GI:  #PEG site leaking  - Resolved, no active leak  #Diet: PEG feeds resumed    - Pantpoprozole for GI prophylaxis   - Bowel reg w senna/miralax     ENDO:  #DM2: HbA1c 7.7%   - Insulin Sliding Scale  - on NPH 7u q6h  - ISS    HEMATOLOGIC:  #Anemia of chronic disease  - s/p 1 unit PRBC 8/16   #Coag panel shows  #DVT prophylaxis with lovenox    # RUE + RLE Swelling  - DVT study ordered; negative    ID:  #ASA PNA   - Sputum cx + pseudomonas, pansensitive   - started on inhaled Tobramycin 300mg q12h (8/22-) to enhance secretion clearance  - extended infusion high dose zosyn 4.5g q8hrs 8/8-8/19  - Zosyn stopped 8/19  - BCx ordered today 8/19  - Monitor for signs of infection     Psych:  #Depression  - Continue home medication lexapro     Ethics:  - DNR/intubate  - palliative care following; appreciate recs  - family discussing about palliative extubation and transition to comfort care    Dispo:   - MICU  - family discussing about palliative extubation and transition to comfort care

## 2024-08-24 NOTE — PROVIDER CONTACT NOTE (HYPOGLYCEMIA EVENT) - NS PROVIDER CONTACT BACKGROUND-HYPO
Age: 89y    Gender: Male    POCT Blood Glucose:  66 mg/dL (08-24-24 @ 11:15)  66 mg/dL (08-24-24 @ 11:14)  131 mg/dL (08-24-24 @ 05:29)  149 mg/dL (08-24-24 @ 00:23)  174 mg/dL (08-23-24 @ 17:08)  157 mg/dL (08-23-24 @ 12:29)      eMAR:  insulin lispro (ADMELOG) corrective regimen sliding scale   2 Unit(s) SubCutaneous (08-23-24 @ 17:11)   2 Unit(s) SubCutaneous (08-23-24 @ 12:31)    insulin NPH human recombinant   7 Unit(s) SubCutaneous (08-24-24 @ 05:31)   7 Unit(s) SubCutaneous (08-24-24 @ 00:25)   7 Unit(s) SubCutaneous (08-23-24 @ 17:10)   7 Unit(s) SubCutaneous (08-23-24 @ 12:31)

## 2024-08-24 NOTE — PROGRESS NOTE ADULT - SUBJECTIVE AND OBJECTIVE BOX
MICU Progress Note    SUBJECTIVE  INTERVAL EVENTS:  - NAEON  Pt intubated and sedated, unable to participate in exam.     OBJECTIVE  Physical Exam:   GEN: intubated, sedated  HEAD:  Atraumatic, Normocephalic  EYES: EOMI, PERRLA, conjunctiva and sclera clear  ENT: Moist mucous membranes  NECK: Supple, No JVD  CHEST/LUNG: Rhonchorous breath sounds; No rales, rhonchi, wheezing, or rubs.   HEART: Regular rate and rhythm; No murmurs, rubs, or gallops  ABDOMEN: Bowel sounds present; Soft, Nontender, Nondistended. No hepatomegaly  EXTREMITIES:  2+ Peripheral Pulses, brisk capillary refill. No clubbing, cyanosis, or edema  NERVOUS SYSTEM:  Alert & Oriented X0; withdraws slightly to pain; eyes open; does not follow commands  MSK: equal muscle bulk  SKIN: No rashes or lesions    ICU Vital Signs Last 24 Hrs  T(F): 98.8 (24 Aug 2024 04:00), Max: 100.9 (23 Aug 2024 12:00)  HR: 92 (24 Aug 2024 07:00) (92 - 132)  BP: 109/45 (24 Aug 2024 07:00) (102/54 - 129/54)  BP(mean): 64 (24 Aug 2024 07:00) (61 - 77)  ABP: --  ABP(mean): --  RR: 17 (24 Aug 2024 07:00) (14 - 22)  SpO2: 100% (24 Aug 2024 07:00) (96% - 100%)    I&O's Summary    23 Aug 2024 07:01  -  24 Aug 2024 07:00  --------------------------------------------------------  IN: 980 mL / OUT: 750 mL / NET: 230 mL    Mode: AC/ CMV (Assist Control/ Continuous Mandatory Ventilation)  RR (machine): 16  TV (machine): 350  FiO2: 30  PEEP: 5  ITime: 0.7  MAP: 9  PIP: 23    LABS:                        6.7    9.35  )-----------( 352      ( 24 Aug 2024 03:00 )             22.1     08-24    144  |  108<H>  |  32<H>  ----------------------------<  137<H>  4.5   |  25  |  1.46<H>    Ca    8.4      24 Aug 2024 03:00  Phos  3.1     08-24  Mg     2.20     08-24    TPro  7.3  /  Alb  2.0<L>  /  TBili  <0.2  /  DBili  x   /  AST  27  /  ALT  10  /  AlkPhos  81  08-24    PT/INR - ( 24 Aug 2024 03:00 )   PT: 12.4 sec;   INR: 1.10 ratio    PTT - ( 24 Aug 2024 03:00 )  PTT:29.5 sec  Venous: 08-24-24 @ 03:00 FiO2: -- Oxygen Sat% 66.7    Urinalysis Basic - ( 24 Aug 2024 03:00 )    Color: x / Appearance: x / SG: x / pH: x  Gluc: 137 mg/dL / Ketone: x  / Bili: x / Urobili: x   Blood: x / Protein: x / Nitrite: x   Leuk Esterase: x / RBC: x / WBC x   Sq Epi: x / Non Sq Epi: x / Bacteria: x    CAPILLARY BLOOD GLUCOSE  POCT Blood Glucose.: 131 mg/dL (24 Aug 2024 05:29)  POCT Blood Glucose.: 149 mg/dL (24 Aug 2024 00:23)  POCT Blood Glucose.: 174 mg/dL (23 Aug 2024 17:08)  POCT Blood Glucose.: 157 mg/dL (23 Aug 2024 12:29)    RADIOLOGY & ADDITIONAL TESTS:  Other Labs  Imaging Personally Reviewed: No interval imaging  Electrocardiogram Personally Reviewed: No interval imaging    Medications and Allergies:   MEDICATIONS  (STANDING):  albuterol/ipratropium for Nebulization 3 milliLiter(s) Nebulizer every 6 hours  aspirin  chewable 81 milliGRAM(s) Oral daily  chlorhexidine 0.12% Liquid 15 milliLiter(s) Oral Mucosa two times a day  chlorhexidine 2% Cloths 1 Application(s) Topical daily  dextrose 5%. 1000 milliLiter(s) (50 mL/Hr) IV Continuous <Continuous>  dextrose 5%. 1000 milliLiter(s) (100 mL/Hr) IV Continuous <Continuous>  dextrose 50% Injectable 25 Gram(s) IV Push once  dextrose 50% Injectable 25 Gram(s) IV Push once  dextrose Oral Gel 15 Gram(s) Oral once  doxazosin 2 milliGRAM(s) Oral at bedtime  escitalopram 10 milliGRAM(s) Oral daily  glucagon  Injectable 1 milliGRAM(s) IntraMuscular once  heparin   Injectable 5000 Unit(s) SubCutaneous every 8 hours  insulin lispro (ADMELOG) corrective regimen sliding scale   SubCutaneous every 6 hours  insulin NPH human recombinant 7 Unit(s) SubCutaneous every 6 hours  levETIRAcetam  Solution 500 milliGRAM(s) Oral two times a day  midodrine 10 milliGRAM(s) Oral every 8 hours  modafinil 100 milliGRAM(s) Oral <User Schedule>  pantoprazole   Suspension 40 milliGRAM(s) Oral daily  petrolatum Ophthalmic Ointment 1 Application(s) Both EYES two times a day  polyethylene glycol 3350 17 Gram(s) Oral daily  senna Syrup 10 milliLiter(s) Oral at bedtime  sodium chloride 3%  Inhalation 4 milliLiter(s) Inhalation every 6 hours  ticagrelor 60 milliGRAM(s) Oral every 12 hours  tobramycin for Nebulization 300 milliGRAM(s) Inhalation every 12 hours  valproic  acid Syrup 250 milliGRAM(s) Oral three times a day  zinc oxide 40% Paste 1 Application(s) Topical two times a day    MEDICATIONS  (PRN):    Antimicrobials  tobramycin for Nebulization 300 milliGRAM(s) Inhalation every 12 hours  piperacillin/tazobactam IVPB.. 4.5 Gram(s) IV Intermittent every 8 hours, 08-18-24 @ 18:03, Routine, Stop order after: 3 Days  tobramycin for Nebulization 300 milliGRAM(s) Inhalation every 12 hours    Allergies    Cipro (Unknown)  fluoroquinolone antibiotics (Other)  carbamazepine (Other)  Tegretol (Unknown)    Intolerances

## 2024-08-25 NOTE — PROGRESS NOTE ADULT - SUBJECTIVE AND OBJECTIVE BOX
Jose A Ritchie  PGY-2 Resident Physician     Patient is a 89y old  Male who presents with a chief complaint of Cardiac Arrest (24 Aug 2024 07:34)    SUBJECTIVE / OVERNIGHT EVENTS:  NAEON.     MEDICATIONS  (STANDING):  albuterol/ipratropium for Nebulization 3 milliLiter(s) Nebulizer every 6 hours  aspirin  chewable 81 milliGRAM(s) Oral daily  chlorhexidine 0.12% Liquid 15 milliLiter(s) Oral Mucosa two times a day  chlorhexidine 2% Cloths 1 Application(s) Topical daily  dextrose 5%. 1000 milliLiter(s) (50 mL/Hr) IV Continuous <Continuous>  dextrose 5%. 1000 milliLiter(s) (100 mL/Hr) IV Continuous <Continuous>  dextrose 50% Injectable 12.5 Gram(s) IV Push once  dextrose 50% Injectable 25 Gram(s) IV Push once  dextrose 50% Injectable 25 Gram(s) IV Push once  dextrose Oral Gel 15 Gram(s) Oral once  doxazosin 2 milliGRAM(s) Oral at bedtime  escitalopram 10 milliGRAM(s) Oral daily  glucagon  Injectable 1 milliGRAM(s) IntraMuscular once  heparin   Injectable 5000 Unit(s) SubCutaneous every 8 hours  insulin lispro (ADMELOG) corrective regimen sliding scale   SubCutaneous every 6 hours  insulin NPH human recombinant 7 Unit(s) SubCutaneous every 6 hours  levETIRAcetam  Solution 500 milliGRAM(s) Oral two times a day  midodrine 10 milliGRAM(s) Oral every 8 hours  pantoprazole   Suspension 40 milliGRAM(s) Oral daily  petrolatum Ophthalmic Ointment 1 Application(s) Both EYES two times a day  polyethylene glycol 3350 17 Gram(s) Oral daily  senna Syrup 10 milliLiter(s) Oral at bedtime  sodium chloride 3%  Inhalation 4 milliLiter(s) Inhalation every 6 hours  ticagrelor 60 milliGRAM(s) Oral every 12 hours  tobramycin for Nebulization 300 milliGRAM(s) Inhalation every 12 hours  valproic  acid Syrup 125 milliGRAM(s) Oral three times a day  zinc oxide 40% Paste 1 Application(s) Topical two times a day    MEDICATIONS  (PRN):    Allergies    Cipro (Unknown)  fluoroquinolone antibiotics (Other)  carbamazepine (Other)  Tegretol (Unknown)    Intolerances        Vital Signs Last 24 Hrs  T(C): 37.2 (25 Aug 2024 04:00), Max: 37.2 (25 Aug 2024 04:00)  T(F): 99 (25 Aug 2024 04:00), Max: 99 (25 Aug 2024 04:00)  HR: 102 (25 Aug 2024 06:00) (79 - 106)  BP: 129/62 (25 Aug 2024 06:00) (107/42 - 130/69)  BP(mean): 81 (25 Aug 2024 06:00) (62 - 87)  RR: 17 (25 Aug 2024 06:00) (11 - 25)  SpO2: 96% (25 Aug 2024 06:00) (96% - 100%)    Parameters below as of 25 Aug 2024 04:03  Patient On (Oxygen Delivery Method): ventilator      Daily     Daily   I&O's Summary    24 Aug 2024 07:01  -  25 Aug 2024 07:00  --------------------------------------------------------  IN: 930 mL / OUT: 705 mL / NET: 225 mL        Physical Exam  GEN: intubated, sedated  HEAD:  Atraumatic, Normocephalic  EYES: EOMI, PERRLA, conjunctiva and sclera clear  ENT: Moist mucous membranes  NECK: Supple, No JVD  CHEST/LUNG: Rhonchorous breath sounds; No rales, rhonchi, wheezing, or rubs.   HEART: Regular rate and rhythm; No murmurs, rubs, or gallops  ABDOMEN: Bowel sounds present; Soft, Nontender, Nondistended. No hepatomegaly  EXTREMITIES:  2+ Peripheral Pulses, brisk capillary refill. No clubbing, cyanosis, or edema  NERVOUS SYSTEM:  Alert & Oriented X0; withdraws slightly to pain; eyes open; does not follow commands  MSK: equal muscle bulk  SKIN: No rashes or lesions    DIAGNOSTICS:                         7.5    7.71  )-----------( 329      ( 24 Aug 2024 14:07 )             23.5     Hgb Trend: 7.5<--, 6.7<--, 7.3<--, 6.5<--, 7.0<--  08-24    144  |  108<H>  |  32<H>  ----------------------------<  137<H>  4.5   |  25  |  1.46<H>    Ca    8.4      24 Aug 2024 03:00  Phos  3.1     08-24  Mg     2.20     08-24    TPro  7.3  /  Alb  2.0<L>  /  TBili  <0.2  /  DBili  x   /  AST  27  /  ALT  10  /  AlkPhos  81  08-24    CAPILLARY BLOOD GLUCOSE      POCT Blood Glucose.: 137 mg/dL (25 Aug 2024 05:35)  POCT Blood Glucose.: 128 mg/dL (24 Aug 2024 23:34)  POCT Blood Glucose.: 124 mg/dL (24 Aug 2024 17:53)  POCT Blood Glucose.: 169 mg/dL (24 Aug 2024 11:58)  POCT Blood Glucose.: 165 mg/dL (24 Aug 2024 11:57)  POCT Blood Glucose.: 66 mg/dL (24 Aug 2024 11:15)  POCT Blood Glucose.: 66 mg/dL (24 Aug 2024 11:14)    Creatinine Trend: 1.46<--, 1.47<--, 1.54<--, 1.68<--, 1.75<--, 1.96<--  LIVER FUNCTIONS - ( 24 Aug 2024 03:00 )  Alb: 2.0 g/dL / Pro: 7.3 g/dL / ALK PHOS: 81 U/L / ALT: 10 U/L / AST: 27 U/L / GGT: x           PT/INR - ( 24 Aug 2024 03:00 )   PT: 12.4 sec;   INR: 1.10 ratio         PTT - ( 24 Aug 2024 03:00 )  PTT:29.5 sec      Urinalysis Basic - ( 24 Aug 2024 03:00 )    Color: x / Appearance: x / SG: x / pH: x  Gluc: 137 mg/dL / Ketone: x  / Bili: x / Urobili: x   Blood: x / Protein: x / Nitrite: x   Leuk Esterase: x / RBC: x / WBC x   Sq Epi: x / Non Sq Epi: x / Bacteria: x

## 2024-08-25 NOTE — PROGRESS NOTE ADULT - ATTENDING COMMENTS
Patient seen and examined with ICU team at bedside during rounds after lab data, medical records and radiology reports reviewed. I have read and agreeable in general with the documented note, assessment, and management plan which reflects my opinions from bedside rounds.      SHAIK CHARANJIT is a 89y Male with PMH of  CABG (2022 s/p PPM) Dm2 CKD, several CVAs, Admitted for PEA arrest after AHRF 2/2 aspiration transferred to MICU for MRI evaluation     No overnight events.     #Acute Hypoxic Respiratory Failure  Mode: CPAP with PS, FiO2: 30, PEEP: 5, PS: 15  - CPOT 0 / RASS -1  - Family would like to discuss extubation today.   - some discussion surrounding   #PsA Pneumonia  - continuing iTobi  - continue chest PT, saline nebs   #post PEA arrest  #Anoxic brain injury  - MRI demonstrating Hypoxemic ischemic encephalopathy  - EEGs negative. NSE elevated to 46 but 4 days post arrest  - Neuro following for prognostication, Repeated EEG (Severe diffuse slowing no seizures)   # BM - overnight  # POCUS - ni  # DVT ppx - Enox  # GOC - DNR        This patient is critically ill and required frequent bedside visits with therapy change. I have personally provided 45 minutes of critical care time concurrently with the resident/fellow/NP/PA. This time excludes time spent on separate procedures and time spent teaching. I have reviewed theresident/fellow/NP/PA’s documentation and I agree with the assessment and plan of care.    Rangel Kinsey MD  Interventional Pulmonology & Critical Care Medicine. Patient seen and examined with ICU team at bedside during rounds after lab data, medical records and radiology reports reviewed. I have read and agreeable in general with the documented note, assessment, and management plan which reflects my opinions from bedside rounds.      SHAIK CHARANJIT is a 89y Male with PMH of  CABG (2022 s/p PPM) Dm2 CKD, several CVAs, Admitted for PEA arrest after AHRF 2/2 aspiration transferred to MICU for MRI evaluation     No overnight events.     #Acute Hypoxic Respiratory Failure  Mode: CPAP with PS, FiO2: 30, PEEP: 5, PS: 15  - CPOT 0 / RASS -1  - Family would like to discuss extubation today.   - plan for DNI following, if respiratory failure following would want CMO  #PsA Pneumonia  - continuing iTobi  - continue chest PT, saline nebs   #post PEA arrest  #Anoxic brain injury  - MRI demonstrating Hypoxemic ischemic encephalopathy  - EEGs negative. NSE elevated to 46 but 4 days post arrest  - Neuro following for prognostication, Repeated EEG (Severe diffuse slowing no seizures)   # BM - overnight  # POCUS - ni  # DVT ppx - Enox  # GOC - DNR        This patient is critically ill and required frequent bedside visits with therapy change. I have personally provided 45 minutes of critical care time concurrently with the resident/fellow/NP/PA. This time excludes time spent on separate procedures and time spent teaching. I have reviewed theresident/fellow/NP/PA’s documentation and I agree with the assessment and plan of care.    Rangel Kinsey MD  Interventional Pulmonology & Critical Care Medicine.

## 2024-08-25 NOTE — PROGRESS NOTE ADULT - ASSESSMENT
89M CAD, CABG, s/p stents, s/p PPM placement, HTN, HLD, DM, CKD, CVA x3, s/p PEG tube for dysphagia and acute cholecystitis s/p cholecystostomy ( not surgical candidate, s/p cholecystomy tube removal) initially presented/admitted on 8/7 2 to MS change; found to have UTI with sepsis and acute on CKD s/p PEA cardiac arrest 8/8 likely 2/2 AHRF 2/2 aspiration s/p ROSC (after 13 mins), now transferred to [facility] for MRI Brain to assess prognostication iso concern for anoxic brain injury.      NEURO:  #Mental status: AAOx0. Baseline AAOx1. Likely 2/2 anoxic encephalopathy .  - MRI for prognostication post PEA arrest -> ischemic encephalopathy consistent with anoxic brain injury  - reduced Depakote to home dose. c/w Keppra 500mg BID  - on aspirin, Brilinta, and statin for secondary stroke prevention.  - on modafinil 100mg BID as per family's request to stimulate mental status. Started on 8/17/24  - Neuro following for prognostication per family request  - EEG (8/24): Severe diffuse cerebral dysfunction is nonspecific in etiology. No epileptiform abnormalities or seizures.      CV:  #s/p PEA arrest (8/8) likely 2/2 aspiration 8/7  #CHF EF 45%  #PPM  - GDMT currently held  - Currently no IV pressor requirements   - Midodrine 10mg q8  - s/p Lasix 40mg IVPs for anasarca    #HTN/HLD  - Not on any home medications     PULM:  #Intubated 2/2 aspiration pna  s/p zosyn  - CPAP 5/5, triggering spontaneously  - Duonebs/Nebulized 3% NS for pulmonary toileting   - Chest IPV   - c/w tobramycin nebs (8/22-)    RENAL:  #MABLE:   -Trend I/Os and daily weights, and Cr. 1.5 --> 1.9  - Cr coming down  - Lasix 40mg IV PRN for anasarca    GI:  #PEG site leaking  - Resolved, no active leak  #Diet: PEG feeds resumed    - Pantoprazole for GI prophylaxis   - Bowel reg w senna/miralax     ENDO:  #DM2: HbA1c 7.7%   - Insulin Sliding Scale  - on NPH 7u q6h  - ISS    HEMATOLOGIC:  #Anemia of chronic disease  - s/p 1 unit PRBC 8/16   #Coag panel shows  #DVT prophylaxis with lovenox    # RUE + RLE Swelling  - DVT study ordered; negative    ID:  #ASA PNA   - Sputum cx + pseudomonas, pansensitive   - started on inhaled Tobramycin 300mg q12h (8/22-) to enhance secretion clearance  - extended infusion high dose zosyn 4.5g q8hrs 8/8-8/19  - Zosyn stopped 8/19  - BCx ordered today 8/19  - Monitor for signs of infection     Psych:  #Depression  - Continue home medication lexapro     Ethics:  - DNR/intubate  - palliative care following; appreciate recs  - family discussing about palliative extubation and transition to comfort care    Dispo:   - MICU  - family discussing about palliative extubation and transition to comfort care

## 2024-08-26 NOTE — PROGRESS NOTE ADULT - REASON FOR ADMISSION
cardiac arrest

## 2024-08-26 NOTE — PROGRESS NOTE ADULT - PROBLEM SELECTOR PLAN 5
Patient requires total support for all ADL's.   Please assist with ADLs  Skin care as per protocol.
- Start IV Robinul 0.4mg q6 ATC  - Start Scopolamine Patch

## 2024-08-26 NOTE — PROGRESS NOTE ADULT - PROBLEM SELECTOR PLAN 6
- Nutrition recommendations appreciated  - 8/26- Sons inquiring about holding tube feeds during night time only but to continue tube feeds during daytime  Counseled sons that tube feeds should be held at this time as patient with dyspnea and excessive oral secretions; discussed that restarting tube feeds likely would worsen these symptoms.
due to anoxia s/p cardiac arrest in setting of dementia  - please coordinate care with family.

## 2024-08-26 NOTE — PROGRESS NOTE ADULT - ASSESSMENT
89M CAD, CABG, s/p stents, s/p PPM placement, HTN, HLD, DM, CKD, CVA x3, s/p PEG tube for dysphagia and acute cholecystitis s/p cholecystostomy ( not surgical candidate, s/p cholecystomy tube removal) initially presented/admitted on 8/7 2 to MS change; found to have UTI with sepsis and acute on CKD s/p PEA cardiac arrest 8/8 likely 2/2 AHRF 2/2 aspiration s/p ROSC (after 13 mins), now transferred to [facility] for MRI Brain to assess prognostication iso concern for anoxic brain injury.      NEURO:  #Mental status: AAOx0. Baseline AAOx1. Likely 2/2 anoxic encephalopathy .  - MRI for prognostication post PEA arrest -> ischemic encephalopathy consistent with anoxic brain injury  - reduced Depakote to home dose. c/w Keppra 500mg BID  - on aspirin, Brilinta, and statin for secondary stroke prevention.  - on modafinil 100mg BID as per family's request to stimulate mental status. Started on 8/17/24  - Neuro following for prognostication per family request  - EEG (8/24): Severe diffuse cerebral dysfunction is nonspecific in etiology. No epileptiform abnormalities or seizures.      CV:  #s/p PEA arrest (8/8) likely 2/2 aspiration 8/7  #CHF EF 45%  #PPM  - GDMT currently held  - Currently no IV pressor requirements   - Midodrine 10mg q8  - s/p Lasix 40mg IVPs for anasarca    #HTN/HLD  - Not on any home medications     PULM:  #Intubated 2/2 aspiration pna  s/p zosyn  - CPAP 5/5, triggering spontaneously  - Duonebs/Nebulized 3% NS for pulmonary toileting   - Chest IPV   - c/w tobramycin nebs (8/22-)    RENAL:  #MABLE:   -Trend I/Os and daily weights, and Cr. 1.5 --> 1.9  - Cr coming down  - Lasix 40mg IV PRN for anasarca    GI:  #PEG site leaking  - Resolved, no active leak  #Diet: PEG feeds resumed    - Pantoprazole for GI prophylaxis   - Bowel reg w senna/miralax     ENDO:  #DM2: HbA1c 7.7%   - Insulin Sliding Scale  - on NPH 7u q6h  - ISS    HEMATOLOGIC:  #Anemia of chronic disease  - s/p 1 unit PRBC 8/16   #Coag panel shows  #DVT prophylaxis with lovenox    # RUE + RLE Swelling  - DVT study ordered; negative    ID:  #ASA PNA   - Sputum cx + pseudomonas, pansensitive   - started on inhaled Tobramycin 300mg q12h (8/22-) to enhance secretion clearance  - extended infusion high dose zosyn 4.5g q8hrs 8/8-8/19  - Zosyn stopped 8/19  - BCx ordered today 8/19  - Monitor for signs of infection     Psych:  #Depression  - Continue home medication lexapro     Ethics:  - DNR/intubate  - palliative care following; appreciate recs  - family discussing about palliative extubation and transition to comfort care    Dispo:   - MICU  - family discussing about palliative extubation and transition to comfort care

## 2024-08-26 NOTE — PROGRESS NOTE ADULT - TIME BILLING
Medical management as above, reviewing chart and coordinating care with primary team/staff, as well as reviewing vitals, radiology, medication list, recent labs, and prior records.    Does not include teaching time.
Reviewing the EMR, vitals, imaging, medication list, recent labs, prior records and coordinating care with medical providers. This time excludes procedures and teaching.
Medical management as above, reviewing chart and coordinating care with primary team/staff, as well as reviewing vitals, radiology, medication list, recent labs, and prior records.    Does not include teaching time.
Medical management as above, reviewing chart and coordinating care with primary team/staff, as well as reviewing vitals, radiology, medication list, recent labs, and prior records.    Does not include teaching time.
Time spent for extensive review of the physical chart, electronic medical record, and documentation to obtain collateral information including but not limited to:  [x] Inpatient records (ED, H&P, primary team, and consultants if applicable, care coordination)  [x] Inpatient values/results (biomarkers, immunoassays, imaging, and microbiology results)  [x] Current or proposed treatment plans  [x] Pharmacotherapy review  [x] Discussion and coordinating care with primary team and interdisciplinary staff and floor staff  [x] Discussion including counseling/ education with the surrogate decision maker, or family
Time spent for extensive review of the physical chart, electronic medical record, and documentation to obtain collateral information including but not limited to:  [x] Inpatient records (ED, H&P, primary team, and consultants if applicable, care coordination)  [x] Inpatient values/results (biomarkers, immunoassays, imaging, and microbiology results)  [x] Current or proposed treatment plans  [x] Pharmacotherapy review  [x] Discussion and coordinating care with primary team and interdisciplinary staff and floor staff  [x] Discussion including counseling/ education with the  surrogate decision maker, or family

## 2024-08-26 NOTE — CHART NOTE - NSCHARTNOTEFT_GEN_A_CORE
Called by bedside by nurse for     On physical exam, no spontaneous movements were present. Patient did not respond to verbal or physical stimuli. Pupils were mid-dilated and fixed. No breath sounds were appreciated over either lung field. No carotid pulses were palpable. No heart sounds were auscultated.   Patient pronounced dead at 1708. Attending notified.  Family was notified. Family declined autopsy.

## 2024-08-26 NOTE — PROGRESS NOTE ADULT - PROBLEM SELECTOR PLAN 4
Nutrition recommendations appreciated  Tube feeds via PEG, which family wishes to continue.
- Patient started on IV Morphine gtt @1mg/hr and IV Morphine 2mg PRN this AM  - Continue IV Morphine gtt@ 1mg/hr  - D/C IV Morphine  - Start IV Dilaudid 0.2mg q2 PRN pain/dyspnea; bowel regimen while on opiates  - Start IV Ativan 0.25mg q2 PRN anxiety/agitation/ refractory dyspnea

## 2024-08-26 NOTE — PROGRESS NOTE ADULT - SUBJECTIVE AND OBJECTIVE BOX
Progress Note    08-18-24 (8d)    Patient is a 89y old  Male who presents with a chief complaint of cardiac arrest (25 Aug 2024 07:28)      Subjective / Overnight Events :  - No acute events overnight.  - Pt seen and examined at bedside.     Additional ROS (if any):    MEDICATIONS  (STANDING):  albuterol/ipratropium for Nebulization 3 milliLiter(s) Nebulizer every 6 hours  aspirin  chewable 81 milliGRAM(s) Oral daily  chlorhexidine 0.12% Liquid 15 milliLiter(s) Oral Mucosa two times a day  chlorhexidine 2% Cloths 1 Application(s) Topical daily  dextrose 5%. 1000 milliLiter(s) (50 mL/Hr) IV Continuous <Continuous>  dextrose 5%. 1000 milliLiter(s) (100 mL/Hr) IV Continuous <Continuous>  dextrose 50% Injectable 12.5 Gram(s) IV Push once  dextrose 50% Injectable 25 Gram(s) IV Push once  dextrose 50% Injectable 25 Gram(s) IV Push once  dextrose Oral Gel 15 Gram(s) Oral once  doxazosin 2 milliGRAM(s) Oral at bedtime  escitalopram 10 milliGRAM(s) Oral daily  glucagon  Injectable 1 milliGRAM(s) IntraMuscular once  heparin   Injectable 5000 Unit(s) SubCutaneous every 8 hours  insulin lispro (ADMELOG) corrective regimen sliding scale   SubCutaneous every 6 hours  insulin NPH human recombinant 7 Unit(s) SubCutaneous every 6 hours  levETIRAcetam  Solution 500 milliGRAM(s) Oral two times a day  midodrine 10 milliGRAM(s) Oral every 8 hours  pantoprazole   Suspension 40 milliGRAM(s) Oral daily  petrolatum Ophthalmic Ointment 1 Application(s) Both EYES two times a day  polyethylene glycol 3350 17 Gram(s) Oral daily  senna Syrup 10 milliLiter(s) Oral at bedtime  sodium chloride 3%  Inhalation 4 milliLiter(s) Inhalation every 6 hours  ticagrelor 60 milliGRAM(s) Oral every 12 hours  tobramycin for Nebulization 300 milliGRAM(s) Inhalation every 12 hours  valproic  acid Syrup 125 milliGRAM(s) Oral three times a day  zinc oxide 40% Paste 1 Application(s) Topical two times a day    MEDICATIONS  (PRN):  glycopyrrolate Injectable 0.4 milliGRAM(s) IV Push four times a day PRN Secretions  LORazepam   Injectable 0.5 milliGRAM(s) IV Push every 4 hours PRN Anxiety  morphine  - Injectable 2 milliGRAM(s) IV Push every 2 hours PRN Moderate pain (4-6), Severe pain (7-10), Respiratory rate greater than 22      CAPILLARY BLOOD GLUCOSE      POCT Blood Glucose.: 114 mg/dL (26 Aug 2024 05:56)  POCT Blood Glucose.: 149 mg/dL (25 Aug 2024 23:56)  POCT Blood Glucose.: 176 mg/dL (25 Aug 2024 23:34)  POCT Blood Glucose.: 65 mg/dL (25 Aug 2024 23:14)  POCT Blood Glucose.: 69 mg/dL (25 Aug 2024 23:12)  POCT Blood Glucose.: 112 mg/dL (25 Aug 2024 17:32)  POCT Blood Glucose.: 144 mg/dL (25 Aug 2024 11:48)    I&O's Summary    24 Aug 2024 07:01  -  25 Aug 2024 07:00  --------------------------------------------------------  IN: 930 mL / OUT: 745 mL / NET: 185 mL    25 Aug 2024 07:01  -  26 Aug 2024 06:55  --------------------------------------------------------  IN: 500 mL / OUT: 740 mL / NET: -240 mL        PHYSICAL EXAM:  Vital Signs Last 24 Hrs  T(C): 36.6 (26 Aug 2024 04:29), Max: 37.5 (25 Aug 2024 12:00)  T(F): 97.9 (26 Aug 2024 04:29), Max: 99.5 (25 Aug 2024 12:00)  HR: 106 (26 Aug 2024 04:29) (88 - 115)  BP: 130/63 (26 Aug 2024 04:29) (114/54 - 142/67)  BP(mean): 82 (26 Aug 2024 04:29) (72 - 89)  RR: 25 (26 Aug 2024 04:29) (15 - 25)  SpO2: 100% (26 Aug 2024 04:29) (97% - 100%)    Parameters below as of 26 Aug 2024 04:29  Patient On (Oxygen Delivery Method): face tent        General: NAD, non-toxic appearing   HEENT: PERRLA, EOMi, no scleral icterus  CV: RRR, normal S1 and S2, no m/r/g  Lungs: normal respiratory effort. CTAB, no wheezes, rales, or rhonchi  Abd: soft, nontender, nondistended  Ext: no edema, 2+ peripheral pulses   Pysch: AAOx3, appropriate affect   Neuro: grossly non-focal, moving all extremities spontaneously   Skin: no rashes or lesions     LABS:                          8.6    9.61  )-----------( 377      ( 26 Aug 2024 02:30 )             27.9       WBC Trend: 9.61<--, 7.71<--, 9.35<--  Hb Trend: 8.6<--, 7.5<--, 6.7<--, 7.3<--, 6.5<--    08-26    142  |  107  |  25<H>  ----------------------------<  122<H>  4.7   |  25  |  1.27    Ca    8.8      26 Aug 2024 02:30  Phos  4.1     08-26  Mg     2.20     08-26    TPro  7.5  /  Alb  2.1<L>  /  TBili  0.3  /  DBili  x   /  AST  36  /  ALT  7   /  AlkPhos  81  08-26          Urinalysis Basic - ( 26 Aug 2024 02:30 )    Color: x / Appearance: x / SG: x / pH: x  Gluc: 122 mg/dL / Ketone: x  / Bili: x / Urobili: x   Blood: x / Protein: x / Nitrite: x   Leuk Esterase: x / RBC: x / WBC x   Sq Epi: x / Non Sq Epi: x / Bacteria: x        Blood Gas Venous Comprehensive Result: Performed In Lab (08-26-24 @ 02:30)      RADIOLOGY & ADDITIONAL TESTS: Reviewed Progress Note    08-18-24 (8d)    Patient is a 89y old  Male who presents with a chief complaint of cardiac arrest (25 Aug 2024 07:28)      Subjective / Overnight Events :  - pallative extubated yesterday. made comfort by family this AM during round  - Pt seen and examined at bedside.     Additional ROS (if any):    MEDICATIONS  (STANDING):  albuterol/ipratropium for Nebulization 3 milliLiter(s) Nebulizer every 6 hours  aspirin  chewable 81 milliGRAM(s) Oral daily  chlorhexidine 0.12% Liquid 15 milliLiter(s) Oral Mucosa two times a day  chlorhexidine 2% Cloths 1 Application(s) Topical daily  dextrose 5%. 1000 milliLiter(s) (50 mL/Hr) IV Continuous <Continuous>  dextrose 5%. 1000 milliLiter(s) (100 mL/Hr) IV Continuous <Continuous>  dextrose 50% Injectable 12.5 Gram(s) IV Push once  dextrose 50% Injectable 25 Gram(s) IV Push once  dextrose 50% Injectable 25 Gram(s) IV Push once  dextrose Oral Gel 15 Gram(s) Oral once  doxazosin 2 milliGRAM(s) Oral at bedtime  escitalopram 10 milliGRAM(s) Oral daily  glucagon  Injectable 1 milliGRAM(s) IntraMuscular once  heparin   Injectable 5000 Unit(s) SubCutaneous every 8 hours  insulin lispro (ADMELOG) corrective regimen sliding scale   SubCutaneous every 6 hours  insulin NPH human recombinant 7 Unit(s) SubCutaneous every 6 hours  levETIRAcetam  Solution 500 milliGRAM(s) Oral two times a day  midodrine 10 milliGRAM(s) Oral every 8 hours  pantoprazole   Suspension 40 milliGRAM(s) Oral daily  petrolatum Ophthalmic Ointment 1 Application(s) Both EYES two times a day  polyethylene glycol 3350 17 Gram(s) Oral daily  senna Syrup 10 milliLiter(s) Oral at bedtime  sodium chloride 3%  Inhalation 4 milliLiter(s) Inhalation every 6 hours  ticagrelor 60 milliGRAM(s) Oral every 12 hours  tobramycin for Nebulization 300 milliGRAM(s) Inhalation every 12 hours  valproic  acid Syrup 125 milliGRAM(s) Oral three times a day  zinc oxide 40% Paste 1 Application(s) Topical two times a day    MEDICATIONS  (PRN):  glycopyrrolate Injectable 0.4 milliGRAM(s) IV Push four times a day PRN Secretions  LORazepam   Injectable 0.5 milliGRAM(s) IV Push every 4 hours PRN Anxiety  morphine  - Injectable 2 milliGRAM(s) IV Push every 2 hours PRN Moderate pain (4-6), Severe pain (7-10), Respiratory rate greater than 22      CAPILLARY BLOOD GLUCOSE      POCT Blood Glucose.: 114 mg/dL (26 Aug 2024 05:56)  POCT Blood Glucose.: 149 mg/dL (25 Aug 2024 23:56)  POCT Blood Glucose.: 176 mg/dL (25 Aug 2024 23:34)  POCT Blood Glucose.: 65 mg/dL (25 Aug 2024 23:14)  POCT Blood Glucose.: 69 mg/dL (25 Aug 2024 23:12)  POCT Blood Glucose.: 112 mg/dL (25 Aug 2024 17:32)  POCT Blood Glucose.: 144 mg/dL (25 Aug 2024 11:48)    I&O's Summary    24 Aug 2024 07:01  -  25 Aug 2024 07:00  --------------------------------------------------------  IN: 930 mL / OUT: 745 mL / NET: 185 mL    25 Aug 2024 07:01  -  26 Aug 2024 06:55  --------------------------------------------------------  IN: 500 mL / OUT: 740 mL / NET: -240 mL        PHYSICAL EXAM:  Vital Signs Last 24 Hrs  T(C): 36.6 (26 Aug 2024 04:29), Max: 37.5 (25 Aug 2024 12:00)  T(F): 97.9 (26 Aug 2024 04:29), Max: 99.5 (25 Aug 2024 12:00)  HR: 106 (26 Aug 2024 04:29) (88 - 115)  BP: 130/63 (26 Aug 2024 04:29) (114/54 - 142/67)  BP(mean): 82 (26 Aug 2024 04:29) (72 - 89)  RR: 25 (26 Aug 2024 04:29) (15 - 25)  SpO2: 100% (26 Aug 2024 04:29) (97% - 100%)    Parameters below as of 26 Aug 2024 04:29  Patient On (Oxygen Delivery Method): face tent        General: ill appearing   HEENT: PERRLA, EOMi, no scleral icterus  CV: RRR, normal S1 and S2, no m/r/g  Lungs: on face tent, significant secretion, noisy breathing  Abd: soft, nontender, nondistended  Ext: bilateral edema  Pysch: AAOx0  Neuro: grossly non-focal, moving all extremities spontaneously   Skin: no rashes or lesions     LABS:                          8.6    9.61  )-----------( 377      ( 26 Aug 2024 02:30 )             27.9       WBC Trend: 9.61<--, 7.71<--, 9.35<--  Hb Trend: 8.6<--, 7.5<--, 6.7<--, 7.3<--, 6.5<--    08-26    142  |  107  |  25<H>  ----------------------------<  122<H>  4.7   |  25  |  1.27    Ca    8.8      26 Aug 2024 02:30  Phos  4.1     08-26  Mg     2.20     08-26    TPro  7.5  /  Alb  2.1<L>  /  TBili  0.3  /  DBili  x   /  AST  36  /  ALT  7   /  AlkPhos  81  08-26          Urinalysis Basic - ( 26 Aug 2024 02:30 )    Color: x / Appearance: x / SG: x / pH: x  Gluc: 122 mg/dL / Ketone: x  / Bili: x / Urobili: x   Blood: x / Protein: x / Nitrite: x   Leuk Esterase: x / RBC: x / WBC x   Sq Epi: x / Non Sq Epi: x / Bacteria: x        Blood Gas Venous Comprehensive Result: Performed In Lab (08-26-24 @ 02:30)      RADIOLOGY & ADDITIONAL TESTS: Reviewed

## 2024-08-26 NOTE — PROGRESS NOTE ADULT - SUBJECTIVE AND OBJECTIVE BOX
Date of Service    Indication for Geriatrics and Palliative Care Services:     SUBJECTIVE AND OBJECTIVE:  Patient seen and examined at bedside. Patient being followed up for :     INTERVAL HPI/OVERNIGHT EVENTS:      Allergies    Cipro (Unknown)  fluoroquinolone antibiotics (Other)  carbamazepine (Other)  Tegretol (Unknown)    Intolerances    MEDICATIONS  (STANDING):  albuterol/ipratropium for Nebulization 3 milliLiter(s) Nebulizer every 6 hours  chlorhexidine 2% Cloths 1 Application(s) Topical daily  glycopyrrolate Injectable 0.4 milliGRAM(s) IV Push every 6 hours  levETIRAcetam  Solution 500 milliGRAM(s) Oral two times a day  morphine  Infusion. 1 mG/Hr (1 mL/Hr) IV Continuous <Continuous>  petrolatum Ophthalmic Ointment 1 Application(s) Both EYES two times a day  scopolamine 1 mG/72 Hr(s) Patch 1 Patch Transdermal every 72 hours  valproic  acid Syrup 125 milliGRAM(s) Oral three times a day  zinc oxide 40% Paste 1 Application(s) Topical two times a day    MEDICATIONS  (PRN):  HYDROmorphone  Injectable 0.2 milliGRAM(s) IV Push every 2 hours PRN pain/dyspnea  LORazepam   Injectable 0.5 milliGRAM(s) IV Push every 2 hours PRN Anxiety/agitation/ refractory dyspnea      ITEMS UNCHECKED ARE NOT PRESENT    PRESENT SYMPTOMS: [ ]Unable to self-report due to altered mental status- see [ ] CPOT [ ] PAINADS [ ] RDOS  Source if other than patient:  [ ]Family   [ ]Team     Pain:  [ ]yes [ ]no  QOL impact -   Location -                    Aggravating factors -  Quality -  Radiation -  Timing-  Severity (0-10 scale):  Minimal acceptable level / Pain Goal (0-10 scale):     Dyspnea:                           [ ]Mild [ ]Moderate [ ]Severe    Anxiety:                             [ ]Mild [ ]Moderate [ ]Severe  Agitation:                          [ ]Mild [ ]Moderate [ ]Severe  Fatigue:                             [ ]Mild [ ]Moderate [ ]Severe  Nausea:                             [ ]Mild [ ]Moderate [ ]Severe  Loss of appetite:              [ ]Mild [ ]Moderate [ ]Severe  Constipation:                   [ ]Mild [ ]Moderate [ ]Severe  Diarrhea:                          [ ]Mild [ ]Moderate [ ]Severe  Pruritus:                            [ ]Mild [ ]Moderate [ ]Severe  Depression:                      [ ]Mild [ ]Moderate [ ]Severe    CPOT:    https://www.[facility].org/getattachment/[email or web address]/Critical-Care-Pain-Observation-Tool-(CPOT)    PCSSQ[Palliative Care Spiritual Screening Question]   Severity (0-10):  Score of 4 or > indicate consideration of Chaplaincy referral.  Chaplaincy Referral: [ ] yes [ ] refused [ ] following [x ] deferred    Caregiver [place]? : [ ] yes [ ] no [ ] Declined [x ] Deferred              Social work referral [ ] Patient & Family Centered Care Referral [ ]     Anticipatory Grief present?:  [ ] yes [ ] no  [ x] Deferred                  Social work referral [ ] Chaplaincy Referral[ ]    Other Symptoms:  [ ]All other review of systems negative   [ ] Unable to obtain due to poor mentation     PHYSICAL EXAM:  Vital Signs Last 24 Hrs  T(C): 37.1 (26 Aug 2024 08:00), Max: 37.1 (26 Aug 2024 08:00)  T(F): 98.8 (26 Aug 2024 08:00), Max: 98.8 (26 Aug 2024 08:00)  HR: 122 (26 Aug 2024 08:00) (97 - 122)  BP: 104/55 (26 Aug 2024 08:00) (104/55 - 142/67)  BP(mean): 70 (26 Aug 2024 08:00) (70 - 88)  RR: 25 (26 Aug 2024 08:00) (22 - 25)  SpO2: 90% (26 Aug 2024 08:00) (90% - 100%)    Parameters below as of 26 Aug 2024 08:00  Patient On (Oxygen Delivery Method): face tent    O2 Concentration (%): 98     I&O's Summary    25 Aug 2024 07:01  -  26 Aug 2024 07:00  --------------------------------------------------------  IN: 500 mL / OUT: 740 mL / NET: -240 mL    26 Aug 2024 07:01  -  26 Aug 2024 17:21  --------------------------------------------------------  IN: 6 mL / OUT: 20 mL / NET: -14 mL         GENERAL:  [ ]Alert  [ ]Oriented x   [ ]Lethargic  [ ]Cachexia  [ ]Unarousable  [ ]Verbal  [ ]Non-Verbal  [ ] No Distress  Behavioral:   [ ] Anxiety  [ ] Delirium [ ] Agitation [ ] Calm  [ ] Other  HEENT:  [ ]Normal  [ ] Temporal Wasting  [ ]Dry mouth   [ ]ET Tube/Trach  [ ]Oral lesions  [ ] Mucositis  PULMONARY:   [ ]Clear [ ]Tachypnea  [ ]Audible excessive secretions   [ ]Rhonchi        [ ]Right [ ]Left [ ]Bilateral  [ ]Crackles        [ ]Right [ ]Left [ ]Bilateral  [ ]Wheezing     [ ]Right [ ]Left [ ]Bilateral  [ ]Diminished breath sounds [ ]right [ ]left [ ]bilateral  CARDIOVASCULAR:    [ ]Regular [ ]Irregular [ ]Tachy  [ ]Chacho [ ]Murmur [ ]Other  GASTROINTESTINAL:  [ ]Soft  [ ]Distended   [ ]+BS  [ ]Non tender [ ]Tender  [ ]PEG [ ]OGT/ NGT  Last BM:   GENITOURINARY:  [ ]Normal [ ] Incontinent   [ ]Oliguria/Anuria   [ ]Moss  MUSCULOSKELETAL:   [ ]Normal   [ ]Weakness  [ ]Bed/Wheelchair bound [ ]Edema  [  ] amputation  [  ] contraction  NEUROLOGIC:   [ ]No focal deficits  [ ]Cognitive impairment  [ ]Dysphagia [ ]Dysarthria [ ]Paresis [ ]Other   SKIN: See Nursing Skin Assessment for further details  [ ]Normal    [ ]Rash  [ ]Pressure ulcer(s)       Present on admission [ ]y [ ]n   [  ]  Wound    [  ] hyperpigmentation    CRITICAL CARE:  [ ]Shock Present  [ ]Septic [ ]Cardiogenic [ ]Neurologic [ ]Hypovolemic  [ ]Vasopressors [ ]Inotropes  [ ]Respiratory failure present [ ]Mechanical Ventilation [ ]Non-invasive ventilatory support [ ]High-Flow   [ ]Acute  [ ]Chronic [ ]Hypoxic  [ ]Hypercarbic [ ]Other  [ ]Other organ failure     LABS:  reviewed                         8.6    9.61  )-----------( 377      ( 26 Aug 2024 02:30 )             27.9   08-26    142  |  107  |  25<H>  ----------------------------<  122<H>  4.7   |  25  |  1.27    Ca    8.8      26 Aug 2024 02:30  Phos  4.1     08-26  Mg     2.20     08-26    TPro  7.5  /  Alb  2.1<L>  /  TBili  0.3  /  DBili  x   /  AST  36  /  ALT  7   /  AlkPhos  81  08-26    Urinalysis Basic - ( 26 Aug 2024 02:30 )    Color: x / Appearance: x / SG: x / pH: x  Gluc: 122 mg/dL / Ketone: x  / Bili: x / Urobili: x   Blood: x / Protein: x / Nitrite: x   Leuk Esterase: x / RBC: x / WBC x   Sq Epi: x / Non Sq Epi: x / Bacteria: x      CAPILLARY BLOOD GLUCOSE      POCT Blood Glucose.: 114 mg/dL (26 Aug 2024 05:56)  POCT Blood Glucose.: 149 mg/dL (25 Aug 2024 23:56)  POCT Blood Glucose.: 176 mg/dL (25 Aug 2024 23:34)  POCT Blood Glucose.: 65 mg/dL (25 Aug 2024 23:14)  POCT Blood Glucose.: 69 mg/dL (25 Aug 2024 23:12)  POCT Blood Glucose.: 112 mg/dL (25 Aug 2024 17:32)          RADIOLOGY & ADDITIONAL STUDIES: reviewed     Protein Calorie Malnutrition Present: [ ]mild [ ]moderate [ ]severe [ ]underweight [ ]morbid obesity  https://www.andeal.org/vault/2440/web/files/ONC/Table_Clinical%20Characteristics%20to%20Document%20Malnutrition-White%20JV%20et%20al%202012.pdf    Height (cm): 175.3 (08-18-24 @ 17:12), 175.3 (08-07-24 @ 00:29), 175.3 (07-21-24 @ 06:00)  Weight (kg): 79.7 (08-18-24 @ 17:12), 79.8 (08-17-24 @ 06:00), 74.7 (07-21-24 @ 06:00)  BMI (kg/m2): 25.9 (08-18-24 @ 17:12), 26 (08-17-24 @ 06:00), 24.3 (08-07-24 @ 00:29)    [ ]PPSV2 < or = 30%  [ ]significant weight loss [ ]poor nutritional intake [ ]anasarca   [ ]Artificial Nutrition    REFERRALS:   [ ]Chaplaincy  [ ]Hospice  [ ]Child Life  [ ]Social Work  [ ]Case management [ ]Holistic Therapy        Date of Service  : 8/26/2024  Indication for Geriatrics and Palliative Care Services: goals of care    INTERVAL HPI/OVERNIGHT EVENTS:  Patient extubated yesterday. Per discussion with primary team, patient now comfort measures only    SUBJECTIVE AND OBJECTIVE:  Patient seen with family at bedside. Family confirm goals of comfort directed care.         Allergies    Cipro (Unknown)  fluoroquinolone antibiotics (Other)  carbamazepine (Other)  Tegretol (Unknown)    Intolerances    MEDICATIONS  (STANDING):  albuterol/ipratropium for Nebulization 3 milliLiter(s) Nebulizer every 6 hours  chlorhexidine 2% Cloths 1 Application(s) Topical daily  glycopyrrolate Injectable 0.4 milliGRAM(s) IV Push every 6 hours  levETIRAcetam  Solution 500 milliGRAM(s) Oral two times a day  morphine  Infusion. 1 mG/Hr (1 mL/Hr) IV Continuous <Continuous>  petrolatum Ophthalmic Ointment 1 Application(s) Both EYES two times a day  scopolamine 1 mG/72 Hr(s) Patch 1 Patch Transdermal every 72 hours  valproic  acid Syrup 125 milliGRAM(s) Oral three times a day  zinc oxide 40% Paste 1 Application(s) Topical two times a day    MEDICATIONS  (PRN):  HYDROmorphone  Injectable 0.2 milliGRAM(s) IV Push every 2 hours PRN pain/dyspnea  LORazepam   Injectable 0.5 milliGRAM(s) IV Push every 2 hours PRN Anxiety/agitation/ refractory dyspnea      ITEMS UNCHECKED ARE NOT PRESENT    PRESENT SYMPTOMS: [ x]Unable to self-report due to altered mental status- see [ ] CPOT [x ] PAINADS [x ] RDOS  Source if other than patient:  [ ]Family   [ ]Team     Pain:  [ ]yes [ ]no  QOL impact -   Location -                    Aggravating factors -  Quality -  Radiation -  Timing-  Severity (0-10 scale):  Minimal acceptable level / Pain Goal (0-10 scale):     Dyspnea:                           [ ]Mild [ ]Moderate [ ]Severe  Anxiety:                             [ ]Mild [ ]Moderate [ ]Severe  Agitation:                          [ ]Mild [ ]Moderate [ ]Severe  Fatigue:                             [ ]Mild [ ]Moderate [ ]Severe  Nausea:                             [ ]Mild [ ]Moderate [ ]Severe  Loss of appetite:              [ ]Mild [ ]Moderate [ ]Severe  Constipation:                   [ ]Mild [ ]Moderate [ ]Severe  Diarrhea:                          [ ]Mild [ ]Moderate [ ]Severe    CPOT:    https://www.[facility].org/getattachment/[email or web address]/Critical-Care-Pain-Observation-Tool-(CPOT)    PCSSQ[Palliative Care Spiritual Screening Question]   Severity (0-10):  Score of 4 or > indicate consideration of Chaplaincy referral.  Chaplaincy Referral: [ ] yes [ x] refused [ ] following [ ] deferred    Caregiver [place]? : [ ] yes [ ] no [ ] Declined [x ] Deferred              Social work referral [ ] Patient & Family Centered Care Referral [ ]     Anticipatory Grief present?:  [x ] yes [ ] no  [ ] Deferred                  Social work referral [ ] Chaplaincy Referral[ ]    Other Symptoms:  [ ]All other review of systems negative   [ x] Unable to obtain due to poor mentation     PHYSICAL EXAM:  Vital Signs Last 24 Hrs  T(C): 37.1 (26 Aug 2024 08:00), Max: 37.1 (26 Aug 2024 08:00)  T(F): 98.8 (26 Aug 2024 08:00), Max: 98.8 (26 Aug 2024 08:00)  HR: 122 (26 Aug 2024 08:00) (97 - 122)  BP: 104/55 (26 Aug 2024 08:00) (104/55 - 142/67)  BP(mean): 70 (26 Aug 2024 08:00) (70 - 88)  RR: 25 (26 Aug 2024 08:00) (22 - 25)  SpO2: 90% (26 Aug 2024 08:00) (90% - 100%)    Parameters below as of 26 Aug 2024 08:00  Patient On (Oxygen Delivery Method): face tent    O2 Concentration (%): 98     I&O's Summary    25 Aug 2024 07:01  -  26 Aug 2024 07:00  --------------------------------------------------------  IN: 500 mL / OUT: 740 mL / NET: -240 mL    26 Aug 2024 07:01  -  26 Aug 2024 17:21  --------------------------------------------------------  IN: 6 mL / OUT: 20 mL / NET: -14 mL         GENERAL:  [ ]Alert  [ ]Oriented x   [ ]Lethargic  [ ]Cachexia  [x ]Unarousable  [ ]Verbal  [ x]Non-Verbal    Behavioral:   [ ] Anxiety  [ ] Delirium [ ] Agitation [ ] Calm  [x ] Other-encephalopathy   HEENT:  [ ]Normal  [ ] Temporal Wasting  [ x]Dry mouth   [ ]ET Tube/Trach  [ ]Oral lesions  [ ] Mucositis  PULMONARY:   [ ]Clear [ x]Tachypnea  [x ]Audible excessive secretions   [ ]Rhonchi        [ ]Right [ ]Left [ ]Bilateral  [ ]Crackles        [ ]Right [ ]Left [ ]Bilateral  [ ]Wheezing     [ ]Right [ ]Left [ ]Bilateral  [ ]Diminished breath sounds [ ]right [ ]left [ ]bilateral  CARDIOVASCULAR:    [ ]Regular [ ]Irregular [x ]Tachy  [ ]Chacho [ ]Murmur [ ]Other  GASTROINTESTINAL:   [ ]Soft  [ ]Distended   [ ]+BS  [ ]Non tender [ ]Tender  [ ]PEG [ ]OGT/ NGT  Last BM:   GENITOURINARY:  [ ]Normal [ ] Incontinent   [ ]Oliguria/Anuria   [x ]Moss  MUSCULOSKELETAL:   [ ]Normal   [ ]Weakness  [x ]Bed/Wheelchair bound [ ]Edema  [  ] amputation  [  ] contraction  NEUROLOGIC:   [ ]No focal deficits  [ x]Cognitive impairment  [ x]Dysphagia [ ]Dysarthria [ ]Paresis [ ]Other   SKIN: See Nursing Skin Assessment for further details  [ ]Normal    [ ]Rash  [ ]Pressure ulcer(s)       Present on admission [ ]y [ ]n   [  ]  Wound    [  ] hyperpigmentation    CRITICAL CARE:  [ ]Shock Present  [ ]Septic [ ]Cardiogenic [ ]Neurologic [ ]Hypovolemic  [ ]Vasopressors [ ]Inotropes  [ x]Respiratory failure present [ ]Mechanical Ventilation [ ]Non-invasive ventilatory support [ ]High-Flow [x] face tent   [x ]Acute  [ ]Chronic [ ]Hypoxic  [ ]Hypercarbic [ ]Other  [ ]Other organ failure     LABS:  reviewed                         8.6    9.61  )-----------( 377      ( 26 Aug 2024 02:30 )             27.9   08-26    142  |  107  |  25<H>  ----------------------------<  122<H>  4.7   |  25  |  1.27    Ca    8.8      26 Aug 2024 02:30  Phos  4.1     08-26  Mg     2.20     08-26    TPro  7.5  /  Alb  2.1<L>  /  TBili  0.3  /  DBili  x   /  AST  36  /  ALT  7   /  AlkPhos  81  08-26    Urinalysis Basic - ( 26 Aug 2024 02:30 )    Color: x / Appearance: x / SG: x / pH: x  Gluc: 122 mg/dL / Ketone: x  / Bili: x / Urobili: x   Blood: x / Protein: x / Nitrite: x   Leuk Esterase: x / RBC: x / WBC x   Sq Epi: x / Non Sq Epi: x / Bacteria: x      CAPILLARY BLOOD GLUCOSE      POCT Blood Glucose.: 114 mg/dL (26 Aug 2024 05:56)  POCT Blood Glucose.: 149 mg/dL (25 Aug 2024 23:56)  POCT Blood Glucose.: 176 mg/dL (25 Aug 2024 23:34)  POCT Blood Glucose.: 65 mg/dL (25 Aug 2024 23:14)  POCT Blood Glucose.: 69 mg/dL (25 Aug 2024 23:12)  POCT Blood Glucose.: 112 mg/dL (25 Aug 2024 17:32)          RADIOLOGY & ADDITIONAL STUDIES: reviewed     Protein Calorie Malnutrition Present: [ ]mild [ ]moderate [ ]severe [ ]underweight [ ]morbid obesity  https://www.andeal.org/vault/2440/web/files/ONC/Table_Clinical%20Characteristics%20to%20Document%20Malnutrition-White%20JV%20et%20al%202012.pdf    Height (cm): 175.3 (08-18-24 @ 17:12), 175.3 (08-07-24 @ 00:29), 175.3 (07-21-24 @ 06:00)  Weight (kg): 79.7 (08-18-24 @ 17:12), 79.8 (08-17-24 @ 06:00), 74.7 (07-21-24 @ 06:00)  BMI (kg/m2): 25.9 (08-18-24 @ 17:12), 26 (08-17-24 @ 06:00), 24.3 (08-07-24 @ 00:29)    [x ]PPSV2 < or = 30%  [ ]significant weight loss [ ]poor nutritional intake [ ]anasarca   [ ]Artificial Nutrition    REFERRALS:   [ ]Chaplaincy  [ ]Hospice  [ ]Child Life  [ ]Social Work  [ x]Case management [ ]Holistic Therapy

## 2024-08-26 NOTE — PROGRESS NOTE ADULT - PROBLEM SELECTOR PLAN 3
likely due to aspiration   - s/p course of Zosyn  - patient intubated   - vent management per primary team, potential plan for palliative extubation pending family decision on date/time.
- likely due to aspiration   - s/p course of Zosyn  - s/p extubation. patient on face tent today.  - dyspnea management as below

## 2024-08-26 NOTE — PROGRESS NOTE ADULT - PROBLEM SELECTOR PLAN 1
- S/p cardiac arrest, possibly secondary to acute hypoxic respiratory failure following aspiration event. Subsequent MRI with anoxic brain injury.  - supportive care
S/p cardiac arrest, possibly secondary to acute hypoxic respiratory failure following aspiration event. Subsequent MRI with anoxic brain injury.

## 2024-08-26 NOTE — PROGRESS NOTE ADULT - NUTRITIONAL ASSESSMENT
This patient has been assessed with a concern for Malnutrition and has been determined to have a diagnosis/diagnoses of Severe protein-calorie malnutrition.    This patient is being managed with:   Diet NPO with Tube Feed-  Tube Feeding Modality: Gastrostomy  Glucerna 1.5 Prince (GLUCERNA1.5RTH)  Total Volume for 24 Hours (mL): 540  Continuous  Starting Tube Feed Rate {mL per Hour}: 10  Increase Tube Feed Rate by (mL): 10     Every 4 hours  Until Goal Tube Feed Rate (mL per Hour): 30  Tube Feed Duration (in Hours): 18  Tube Feed Start Time: 11:00  Tube Feed Stop Time: 05:00  Entered: Aug 18 2024  5:56PM  

## 2024-08-26 NOTE — PROGRESS NOTE ADULT - PROBLEM SELECTOR PLAN 9
Case reviewed with primary team     In the event of newly developing, evolving, or worsening symptoms, please contact the Palliative Medicine team via pager (if the patient is at [facility] #[phone] or if the patient is at [facility] #[phone]) The Geriatric and Palliative Medicine service has coverage 24 hours a day/ 7 days a week to provide medical recommendations regarding symptom management needs via telephone.

## 2024-08-26 NOTE — CHART NOTE - NSCHARTNOTESELECT_GEN_ALL_CORE
Death Note/Event Note
Follow Up/Nutrition Services
Neurology
Neurology/Event Note
Pacemaker/Event Note
No

## 2024-08-26 NOTE — CONSULT NOTE ADULT - CONSULT REASON
AMS
rising leukocytosis
Code BLue
None
GOC discussion in setting of advanced illness  with frequent hospitalzations

## 2024-08-26 NOTE — PROGRESS NOTE ADULT - ASSESSMENT
89 yo M w/ CAD s/p CABG s/p stents (last stent 5/2022), s/p PPM, HTN, HLD, T2DM, CKD, PVD, prior CVA x3, Myoclonic Jerks/Seizures, history of acute cholecystitis s/p cholecysteostomy who was initially admitted to [facility] on 8/7 after p/w AMS. Patient found to have UTI with sepsis and acute on chronic kidney failure with course c/b PEA arrest x 13 min likely due to AHRF 2/2 aspiration, now with concern for anoxic encephalopathy, transferred to [facility] for MRI brain for prognostication.   Palliative Care consulted for complex decision making  related to goals of care discussions

## 2024-08-26 NOTE — PROGRESS NOTE ADULT - NS ATTEST RISK PROBLEM GEN_ALL_CORE FT
1. Number and complexity of problems addressed for this patient:    1.1 Moderate (At least 1)  [ ] 1 or more chronic illnesses with exacerbation, progression, or side effects of treatment  [ ] 2 or more stable chronic illnesses  [ ] 1 undiagnosed new problem with uncertain prognosis  [ ] 1 acute illness with systemic symptoms  [ ] 1 acute complicated injury  1.2 High (At least 1)   [ ] 1 or more chronic illnesses with severe exacerbation, progression, or side effects of treatment  [ x] 1 acute or chronic illnesses or injuries that may pose a threat to life or bodily function    2. Amount and/or Complexity of Data that was Reviewed and Analyzed for this case:       Moderate (1 out of 3)       High (2 out of 3)  2.1. (Any combination of 3 of the following)   [x ] Prior External notes were reviewed  [x ] Each test result was reviewed (see "LABS" and "RADIOLOGY & ADDITIONAL STUDIES" above)  [ ] The following tests were ordered and/or reviewed (Only count 1 point for ordering or reviewing a unique test):  	[ ]CBC  	[ ] Chemistry   	[ ] Imaging   	[ ] Other:   [x ] Assessment requiring an independent historian   		Name of historian and relationship: Bedside RN  2.2  [ ] Personally review and interpretation of  image or testing   2.3  [ x] Discussion of management or test interpretation with external physician/other qualified health care professional\appropriate source (not separately reported)    3. Risk of Complications and/or Morbidity or Mortality of for this Patient’s Management:  3.1 Moderate risk of morbidity from additional diagnostic testing or treatment (At least 1):   [ ] Prescription drug management   [ ] Decision regarding minor surgery, treatment, or procedure with identified patient or procedure risk factors  [ ] Decision regarding elective major surgery, treatment, or procedure without identified patient or procedure risk factors   [ ] Diagnosis or treatment significantly limited by social determinants of health   [ ] Other:   3.2 High risk of morbidity from additional diagnostic testing or treatment (At least 1):   [ x] Drug therapy requiring intensive monitoring for toxicity   [ ] Decision regarding elective major surgery, treatment, or procedure with identified patient or procedure risk factors   [ ] Decision regarding emergency major surgery, treatment, or procedure   [ ] Decision regarding hospitalization or escalation of hospital-level of care  [ ] Decision not to resuscitate, not to intubate, or to de-escalate care because of poor prognosis   [ ] Decision to proceed or not with artificial nutrition   [x ] Parenteral controlled substance  [ ] Other:

## 2024-08-26 NOTE — DISCHARGE NOTE FOR THE EXPIRED PATIENT - HOSPITAL COURSE
89y Male with PMH of  CABG ( s/p PPM) Dm2 CKD, several CVAs, Admitted for PEA arrest after AHRF 2/ aspiration transferred to MICU for MRI evaluation. MRI demonstrating Hypoxemic ischemic encephalopathy. EEGs negative. NSE elevated to 46 but 4 days post arrest. per neurology, he has a very poor prognosis for meaningful neurological functional recovery. Repeated EEG showing Severe diffuse slowing no seizures. Family decided on palliative extubation on 24. Made comfort care on 24.  on 2024 at 1708

## 2024-08-26 NOTE — DISCHARGE NOTE NURSING/CASE MANAGEMENT/SOCIAL WORK - NSDCDPATPORTLINK_GEN_ALL_CORE
You can access the [facility] Patient Portal, offered by [facility], by registering with the following website: [email or web address] [email or web address]

## 2024-08-26 NOTE — PROGRESS NOTE ADULT - PROBLEM SELECTOR PLAN 8
- 8/26-  Sons confirm goal for focusing on patient's comfort at this time. They inquired about holding tube feeds during night time only but to continue tube feeds during daytime. Counseled sons that tube feeds should be held at this time as patient with dyspnea and excessive oral secretions; discussed that restarting tube feeds likely would worsen these symptoms.  Educated patient/family about EOL in terms of what to expect.  Questions answered.  Emotional support provided.  Introduced considering inpatient hospice transfer for continued symptom management for end of life care if patient remains stable for transfer. Sons will consider and discuss amongst the family.
In the event of newly developing, evolving, or worsening symptoms, please contact the Palliative Medicine team via pager (if the patient is at [facility] #[phone] or if the patient is at [facility] #[phone]) The Geriatric and Palliative Medicine service has coverage 24 hours a day/ 7 days a week to provide medical recommendations regarding symptom management needs via telephone.

## 2024-08-26 NOTE — PROGRESS NOTE ADULT - PROBLEM SELECTOR PLAN 2
- MRI Brain 8/20 - Cortical restricted diffusion and patchy enhancement in the bilateral posterior frontal, parietal and occipital lobes compatible with hypoxic ischemic encephalopathy.  - Neurology recommendations appreciated - " poor overall prognosis of meaningful recovery". F/u formal neurology recommendations  - supportive care

## 2024-08-26 NOTE — PROGRESS NOTE ADULT - ATTENDING COMMENTS
Patient is a 89 yo M w/ CAD s/p CABG s/p stents (last stent 5/2022), s/p PPM, HTN, HLD, T2DM, CKD, PVD, prior CVA x3, Myoclonic Jerks/Seizures, history of acute cholecystitis s/p cholecysteostomy who was initially admitted to [facility] on 8/7 after p/w AMS. Patient found to have UTI with sepsis and acute on chronic kidney failure with course c/b PEA arrest x 13 min likely due to AHRF 2/2 aspiration, now with concern for anoxic encephalopathy, transferred to [facility] for MRI brain for prognostication.     #Anoxic encephalopathy  #Post Cardiac Arrest  #MABLE on CKD  #Acute respiratory failure  #Fever  #Pseudomonas/ Stenotrophomonas Pneumonia    - Palliatively extubated overnight. in severe respiratory distress. D/W family this am. given patient's symptoms. Agreeable to start morphine gtt and change to full comfort focused care.   - IV morphine gtt, c/w PRNs, ativan, Glycopyrrolate for secretions.   - Will d/c labs, FSG  - D/W multiple family members via phone and at bedside.

## 2024-10-10 NOTE — BH CONSULTATION LIAISON ASSESSMENT NOTE - ABORTED (SELF-INTERRUPTED) ATTEMPT:
Detail Level: Detailed Size Of Lesion: 5mm x 1mm Morphology Per Location (Optional): Hazy brown macule in the furrows Size Of Lesion: 1mm x 1mm Unable to assess

## 2024-11-18 NOTE — PROGRESS NOTE ADULT - PROBLEM/PLAN-1
[facility] Neurology Clinics and Neurodiagnostic Center at [facility] Neurology Clinics at [address] [place] [address] [facility] [address] [place] [address] [phone] Office  [phone] Facsimile           Referring:     Chief Complaint   Patient presents with    Seizures     Hosp f/u.  Last sz was about 6 mo ago    Transient Ischemic Attack     Passed out at pet store.  MRI was neg for stroke     71-year-old lady coming today for follow-up epilepsy.  She has not had a seizure in 6 months.  Tolerates Keppra.  She had a recent hospitalization where she was diagnosed with TIA.  She completed dual antiplatelet therapy x 21 days and now is currently on aspirin.  She is also on 80 mg Lipitor.    [facility] teleneurology consultation in October 11, 2024 impression of TIA with negative brain MRI as well as CTA.  She was said to have history of seizure and breakthrough seizure was in the differential but TIA was thought to be more prevalent.  She was using immediate release Keppra once daily.  She was advised to change to extended release Keppra    I last saw the patient February 27, 2020 where she has epilepsy of unknown syndrome classification.  She was having continued events in which she was referred to the EMU but her insurance company would not cover that.  She was having recurrent event suspicious for seizure.  She had normal 24-hour ambulatory EEG and we are going to arrange for EMU stay and follow-up afterward.  Lamictal and Keppra were continued.    Last seizure  Approximately May 2024    Current antiseizure medication  Keppra 1000 mg tablets--1 tablet twice daily--level 55 October 10, 2024    Previous antiseizure medication  Keppra  Lamictal  Neurontin    Diagnostics   MRI of the brain October 11, 2024 age-related changes  EEG March 11, 2019 normal    Laboratory analysis   October 11, 2024  Hemoglobin A1c 5 9  CBC with white 
DISPLAY PLAN FREE TEXT

## 2025-01-29 NOTE — PROGRESS NOTE ADULT - PROVIDER SPECIALTY LIST ADULT
Called the number on file to go over positive flu A results.  Mom answered the phone.  She is currently taking Tamiflu.  Discussed that she may stay home until 24 hours fever free.  All questions answered. Gastroenterology

## 2025-02-17 NOTE — PROGRESS NOTE ADULT - SUBJECTIVE AND OBJECTIVE BOX
Aashish Boyer MD  Interventional Cardiology / Endovascular Specialist  [place] Office : [address] [place] [address] [place] [address]  Tel:   [place] Office : [address] [facility] [place] [address]  Tel: [phone]    Subjective/Overnight events: Patient lying in bed remains in MICU, on bipap   	  MEDICATIONS:  buMETAnide Injectable 2 milliGRAM(s) IV Push every 12 hours  heparin   Injectable 5000 Unit(s) SubCutaneous every 8 hours  norepinephrine Infusion 0.05 MICROgram(s)/kG/Min IV Continuous <Continuous>      albuterol/ipratropium for Nebulization 3 milliLiter(s) Nebulizer every 6 hours  sodium chloride 3%  Inhalation 4 milliLiter(s) Inhalation every 6 hours    aspirin Suppository 300 milliGRAM(s) Rectal daily  dexMEDEtomidine Infusion 0.2 MICROgram(s)/kG/Hr IV Continuous <Continuous>  escitalopram 10 milliGRAM(s) Oral daily  levETIRAcetam  IVPB 500 milliGRAM(s) IV Intermittent every 12 hours  propofol Infusion 30.055 MICROgram(s)/kG/Min IV Continuous <Continuous>  valproate sodium  IVPB 600 milliGRAM(s) IV Intermittent every 6 hours    pantoprazole  Injectable 40 milliGRAM(s) IV Push every 12 hours  sucralfate suspension 1 Gram(s) Enteral Tube two times a day    dextrose 50% Injectable 25 Gram(s) IV Push once  dextrose 50% Injectable 25 Gram(s) IV Push once  dextrose 50% Injectable 12.5 Gram(s) IV Push once  dextrose Oral Gel 15 Gram(s) Oral once PRN  glucagon  Injectable 1 milliGRAM(s) IntraMuscular once  insulin glargine Injectable (LANTUS) 15 Unit(s) SubCutaneous <User Schedule>  insulin lispro (ADMELOG) corrective regimen sliding scale   SubCutaneous every 6 hours    artificial  tears Solution 1 Drop(s) Left EYE four times a day  chlorhexidine 0.12% Liquid 15 milliLiter(s) Oral Mucosa every 12 hours  chlorhexidine 2% Cloths 1 Application(s) Topical daily  dextrose 5%. 1000 milliLiter(s) IV Continuous <Continuous>  dextrose 5%. 1000 milliLiter(s) IV Continuous <Continuous>  lidocaine   4% Patch 1 Patch Transdermal every 24 hours  petrolatum Ophthalmic Ointment 1 Application(s) Left EYE at bedtime      PAST MEDICAL/SURGICAL HISTORY  PAST MEDICAL & SURGICAL HISTORY:  Hyperlipemia      Hypertension      Coronary Artery Disease      Diabetes Mellitus Type II      Stented Coronary Artery  total 5 stents, last stent 5/2019      Neuropathy      Myocardial infarction      Stroke  mild left facial numbness   no other residuals verbalized      Myoclonic jerking      Stage 3 chronic kidney disease      History of Cataract Extraction      Hx of CABG      H/O coronary angiogram      S/P coronary artery stent placement  1/6/09      S/P placement of cardiac pacemaker          SOCIAL HISTORY: Substance Use (street drugs): ( x ) never used  (  ) other:    FAMILY HISTORY:  No pertinent family history in first degree relatives      PHYSICAL EXAM:  T(C): 36.7 (02-03-24 @ 20:00), Max: 36.9 (02-03-24 @ 16:00)  HR: 88 (02-03-24 @ 21:25) (76 - 97)  BP: 116/53 (02-03-24 @ 21:00) (100/44 - 151/61)  RR: 14 (02-03-24 @ 21:00) (14 - 20)  SpO2: 100% (02-03-24 @ 21:25) (99% - 100%)  Wt(kg): --  I&O's Summary    02 Feb 2024 07:01  -  03 Feb 2024 07:00  --------------------------------------------------------  IN: 2008.3 mL / OUT: 1703 mL / NET: 305.3 mL    03 Feb 2024 07:01  -  03 Feb 2024 22:12  --------------------------------------------------------  IN: 1882.3 mL / OUT: 2345 mL / NET: -462.7 mL      GENERAL: s/p extubation   EYES:  conjunctiva and sclera clear  ENMT: No tonsillar erythema, exudates, or enlargement  Cardiovascular: Normal S1 S2, No JVD, systolic murmur +  Respiratory: b/l ronchi	  Gastrointestinal:  Soft,   Extremities: No edema                           7.1    8.13  )-----------( 359      ( 03 Feb 2024 10:40 )             22.2     02-03    147<H>  |  107  |  35<H>  ----------------------------<  223<H>  3.7   |  30  |  1.84<H>    Ca    8.1<L>      03 Feb 2024 10:40  Phos  2.7     02-03  Mg     2.10     02-03    TPro  5.8<L>  /  Alb  2.3<L>  /  TBili  0.3  /  DBili  x   /  AST  42<H>  /  ALT  46<H>  /  AlkPhos  97  02-03    proBNP:   Lipid Profile:   HgA1c:   TSH:     Consultant(s) Notes Reviewed:  [x ] YES  [ ] NO    Care Discussed with Consultants/Other Providers [ x] YES  [ ] NO    Imaging Personally Reviewed independently:  [x] YES  [ ] NO    All labs, radiologic studies, vitals, orders and medications list reviewed. Patient is seen and examined at bedside. Case discussed with medical team.                Continued Stay SW/CM Assessment/Plan of Care Note       Active Substitute Decision Maker (SDM)    There are no active Substitute Decision Maker (SDM) on file.           Progress note:  Care rounds and chart review completed. Patient had Gtube placed on Friday and per RN, he has now reached goal rate. CM called Laura of [facility], she said she will coordinate with wife today delivery of DME. She also said they will prefer bolus feed administration and they will be training wife as well on how to administer feed, RN and dietician notified. ADOD 1 day pending clearance and hospice arrangement at home-DME.    See SW/CM flowsheets for other objective data.    Disposition Recommendations:  Preliminary discharge destination: Planned Discharge Destination: Hospice  SW/CM recommendation for discharge: SNF    Destination Pharmacy: [facility] #[id number] - [place], [place] - [address] Preferred pharmacy updated    Discharge Plan/Needs:     Continued Care and Services - Admitted Since 1/31/2025       Home Medical Care Coordination complete.      Service Provider Request Status Selected Services Address Phone Fax    [facility]  Selected Home Health Services [address] [facility] [address] [address] [phone]                  Continued Care and Services - Linked Episodes Includes continued care and service providers from the active episodes linked to this encounter, which are listed below      Care Transitions Episode start date: 2/3/2025   There are no active outsourced providers for this episode.                 Prior To Hospitalization:    Living Situation: Spouse and residing at House (From rehab [facility])    .  Support Systems: Family members, Friends, Spouse, Siblings   Home Devices/Equipment: CPAP/BiPAP machine (T), Mobility assist device            Mobility Assist Devices: Sit to stand lift   Type of Service Prior to Hospitalization: PT, OT,  Outpatient services, Transportation services               Patient/Family discharge goal (s):  Hospice     Resources provided:           Prior Function  Bed Mobility: Independent (02/03/25 0920 : Jody Mar, OTR/L)  Transfers: Modified Independent (02/03/25 0920 : Jody Mar, OTR/L)  Ambulation in the Home: Modified  Independent (02/03/25 0920 : Jody Mar, OTR/L)  Ambulation in the Community: Modified Independent (02/03/25 0920 : Jody Mar, OTR/L)    Current Function  Last Filed Values         Value Time User    Current Function  significantly below baseline level of function 2/7/2025  4:04 PM Muriel Nguyen PTA    Therapy Impairments  strength; balance; activity tolerance; safety awareness; coordination; communication 2/7/2025  4:04 PM Muriel Nguyen PTA    ADLs Requiring Support  bed mobility; transfers; ambulation 2/7/2025  4:04 PM Muriel Nguyen PTA          Barriers to Discharge  Identified Barriers to Discharge/Transition Planning: Consult Pending/Clearance

## 2025-03-05 NOTE — PHYSICAL THERAPY INITIAL EVALUATION ADULT - DID THE PATIENT HAVE SURGERY?
----- Message from Sreedhar Mcwilliams MD sent at 3/5/2025  5:56 AM CST -----  Overall better  Lft 2 weeks    n/a status post diagnostic laparoscopy/yes

## 2025-03-07 NOTE — ED PROVIDER NOTE - NS ED ROS FT
not applicable (Male)
Constitutional: (-) fever (-) vomiting  Eyes/ENT: (-) vision changes, (-) hearing changes  Cardiovascular: (-) chest pain, (-) wheezing  Respiratory: (-) cough, (-) shortness of breath  Gastrointestinal: (-) vomiting, (-) diarrhea, (-) abdominal pain  : (-) dysuria   Musculoskeletal: (-) back pain  Integumentary: (-) rash, (-) edema  Neurological: (+)loc  Allergic/Immunologic: (-) pruritus  Endocrine: No history of thyroid disease

## 2025-05-01 NOTE — PROGRESS NOTE ADULT - PROVIDER SPECIALTY LIST ADULT
PHYSICAL THERAPY/OCCUPATIONAL THERAPY - MEDICARE DAILY TREATMENT NOTE (updated 3/23)      Date: 2025          Patient Name:  Yoav Ghosh :  1950   Medical   Diagnosis:  No admission diagnoses are documented for this encounter. Treatment Diagnosis:  I89.0     Lymphedema, not elsewhere classified and R60.9     Edema, unspecified    Referral Source:  Faustino Campbell* Insurance:   Payor: [facility] MEDICARE / Plan: [facility] [employer]EEPERS MEDIBLUE PLUS / Product Type: *No Product type* /                     Patient  verified yes     Visit #   Current  / Total 11 24 per POC   Time   In / Out 1030 1115   Total Treatment Time 45   Total Timed Codes 45   1:1 Treatment Time 45      [facility] Totals Reminder:  bill using total billable   min of TIMED therapeutic procedures and modalities.   8-22 min = 1 unit; 23-37 min = 2 units; 38-52 min = 3 units;  53-67 min = 4 units; 68-82 min = 5 units         SUBJECTIVE    Pain Level (0-10 scale): pt denies pain at rest    Any medication changes, allergies to medications, adverse drug reactions, diagnosis change, or new procedure performed?: [x] No    [] Yes (see summary sheet for update)  Medications: Verified on Patient Summary List    Subjective functional status/changes:     Pt arrives to appt alone, MLB intact B LE.  Pt reports good tolerance to bandages. Pt and wife remain pleased with how his legs are looking. Pt agreeable to treatment.    OBJECTIVE      Therapeutic Procedures:  Tx Min Billable or 1:1 Min (if diff from Tx Min) Procedure, Rationale, Specifics   NP  [id number] Orthotic Management and Training UE, LE and/or trunk, initial orthotic(s) encounter (timed): assessment and fitting to improve positioning of lower extremity during weight bearing and gait, improve pressure distribution of the plantar aspect of the foot, improve upper extremity performance, improve postural stability to improve patient's ability to progress to PLOF and address remaining  Cardiology

## (undated) DEVICE — DRSG CURITY GAUZE SPONGE 4 X 4" 12-PLY NON-STERILE

## (undated) DEVICE — DRSG TEGADERM 4X4.75"

## (undated) DEVICE — VENODYNE/SCD SLEEVE CALF MEDIUM

## (undated) DEVICE — SUT PROLENE 3-0 36" SH

## (undated) DEVICE — INFLATOR ENCORE 26

## (undated) DEVICE — PACK IV START WITH CHG

## (undated) DEVICE — POSITIONER STRAP ARMBOARD VELCRO TS-30

## (undated) DEVICE — BIOPSY FORCEP RADIAL JAW 4 STANDARD WITH NEEDLE

## (undated) DEVICE — MMIS - KIT INTRO 4FR 21GA 10CM 7CM .018IN STF DIL NTNL GW

## (undated) DEVICE — DRSG TAPE UMBILICAL COTTON 2" X 30 X 1/8"

## (undated) DEVICE — Device

## (undated) DEVICE — MMIS - MCATH GUIDE 150CM 1.8-2.6FR FINECROSS MG COR SS

## (undated) DEVICE — MMIS - INTRODUCER SHTH 5FR 50CM 11CM GRAY HUB SNPFT DIL

## (undated) DEVICE — STAPLER SKIN MULTI DIRECTION W35

## (undated) DEVICE — SUT PROLENE 3-0 36" MH

## (undated) DEVICE — BASIN EMESIS 10IN GRADUATED MAUVE

## (undated) DEVICE — DRSG BANDAID 0.75X3"

## (undated) DEVICE — BITE BLOCK ADULT 20 X 27MM (GREEN)

## (undated) DEVICE — SUT SILK 2-0 30" TIES

## (undated) DEVICE — BIOPSY FORCEP COLD DISP

## (undated) DEVICE — MMIS - SHEATH 5FR .074IN 110CM ANL0 CRV STRGT GUIDE CKFLO

## (undated) DEVICE — DENTURE CUP PINK

## (undated) DEVICE — CATH IV SAFE BC 22G X 1" (BLUE)

## (undated) DEVICE — SYR ASEPTO

## (undated) DEVICE — DRAPE LAPAROTOMY W VELCRO CORD TABS

## (undated) DEVICE — TROCAR COVIDIEN VERSAONE FIXATION CANNULA 5MM

## (undated) DEVICE — CLAMP BX HOT RAD JAW 3

## (undated) DEVICE — LUBRICATING JELLY HR ONE SHOT 3G

## (undated) DEVICE — MMIS - GUIDEWIRE STRT 15CM 150CM STRGT BENTSON TPR .035IN

## (undated) DEVICE — SUT SILK 4-0 12-30" TIES

## (undated) DEVICE — TUBING IV SET GRAVITY 3Y 100" MACRO

## (undated) DEVICE — TORQUE DEVICE FOR GUIDEWIRE 0.0100.038"

## (undated) DEVICE — VESSEL LOOP MAXI-BLUE 0.120" X 16"

## (undated) DEVICE — DRAPE 3/4 SHEET 52X76"

## (undated) DEVICE — MMIS - CATHETER 5FR MOT CRV 65CM 2 SH RADOPQ BRAID

## (undated) DEVICE — DRSG TAPE TRANSPORE 1"

## (undated) DEVICE — SOL ANTI FOG

## (undated) DEVICE — MMIS - GUIDEWIRE HTORQ BAL MDWT UNV 2 3CM 190CM J CRV

## (undated) DEVICE — TRAP SPECIMEN SPUTUM 40CC

## (undated) DEVICE — MMIS - CATHETER ARMADA 3MM 80MM 150CM FLXB MRKR

## (undated) DEVICE — SUT PROLENE 4-0 36" SH

## (undated) DEVICE — MMIS - VALVE 20 ATM COPLT HEMOSTASIS .096- IN 20ML INFL

## (undated) DEVICE — GOWN LG

## (undated) DEVICE — CONTAINER FORMALIN 80ML YELLOW

## (undated) DEVICE — SUT SILK 3-0 30" TIES

## (undated) DEVICE — PACK BRONCHOSCOPY

## (undated) DEVICE — SOL IRR NS 0.9% 250ML

## (undated) DEVICE — ELCTR ECG CONDUCTIVE ADHESIVE

## (undated) DEVICE — ADAPTER FIBEROPTIC BRONCHOSCOPE DUAL AXIS SWIVEL

## (undated) DEVICE — PACK PERIPHERAL VASC

## (undated) DEVICE — SALIVA EJECTOR (BLUE)

## (undated) DEVICE — SPONGE X-RAY 4X8"

## (undated) DEVICE — VALVE BIOPSY BRONCHOVIDEOSCOPE

## (undated) DEVICE — TUBING HIGH POWER CONTRAST INJ

## (undated) DEVICE — SOL IRR POUR H2O 500ML

## (undated) DEVICE — MMIS - GUIDEWIRE INQWR 150CM 3MM RDS J CRV .035IN VASC

## (undated) DEVICE — WARMING BLANKET FULL UNDERBODY

## (undated) DEVICE — TROCAR COVIDIEN VERSAPORT BLADELESS OPTICAL 5MM STANDARD

## (undated) DEVICE — SYR LUER SLIP TIP 30CC

## (undated) DEVICE — SUTURE REMOVAL KIT

## (undated) DEVICE — DRSG 2X2

## (undated) DEVICE — MASK SURGICAL WITH EYESHIELD ANTIFOG (ORANGE)

## (undated) DEVICE — MMIS - GUIDEWIRE MRCLBRS 3 11CM 300CM STRGT .014IN VASC

## (undated) DEVICE — DRAPE TOWEL BLUE 17" X 24"

## (undated) DEVICE — SUT PLEDGET SOFT LARGE 3/8" X 3/16" X 1/16" X6

## (undated) DEVICE — UNDERPAD LINEN SAVER 17 X 24"

## (undated) DEVICE — TUBING CANNULA SALTER LABS NASAL ADULT 7FT

## (undated) DEVICE — SUT MONOCRYL 4-0 27" PS-2 UNDYED

## (undated) DEVICE — MMIS - GUIDEWIRE HTORQ SUPRA CORE 300CM STRGT .035IN VASC

## (undated) DEVICE — SUT PDS II 1 48" TP-1

## (undated) DEVICE — GLV 8.5 PROTEXIS (WHITE)

## (undated) DEVICE — DRSG CURITY GAUZE SPONGE 4 X 4" 12-PLY

## (undated) DEVICE — TUBING MEDI-VAC W MAXIGRIP CONNECTORS 1/4"X6'

## (undated) DEVICE — GLV 7 PROTEXIS (WHITE)

## (undated) DEVICE — FORCEP BIOPSY 1.8MM JAW X 100CM DISP

## (undated) DEVICE — LINE BREATHE SAMPLNG

## (undated) DEVICE — SUT PROLENE 6-0 30" C-1

## (undated) DEVICE — LABELS BLANK W PEN

## (undated) DEVICE — FORCEP BIOPSY BRONCHOSCOPE DISP

## (undated) DEVICE — SUT SILK 0 18" TIES

## (undated) DEVICE — MMIS - CATHETER COYOTE 2.5MM 220MM 150CM OTW ULTRA

## (undated) DEVICE — VALVE SUCTION EVIS 160/200/240

## (undated) DEVICE — ELCTR BOVIE TIP BLADE INSULATED 6.5" EDGE

## (undated) DEVICE — SOL IRR POUR NS 0.9% 500ML

## (undated) DEVICE — TROCAR COVIDIEN BLUNT TIP HASSAN 10MM STANDARD

## (undated) DEVICE — SUT VICRYL 3-0 27" SH UNDYED

## (undated) DEVICE — SUT PROLENE 5-0 36" C-1